# Patient Record
Sex: FEMALE | Race: BLACK OR AFRICAN AMERICAN | NOT HISPANIC OR LATINO | Employment: FULL TIME | URBAN - METROPOLITAN AREA
[De-identification: names, ages, dates, MRNs, and addresses within clinical notes are randomized per-mention and may not be internally consistent; named-entity substitution may affect disease eponyms.]

---

## 2017-02-07 ENCOUNTER — TELEPHONE (OUTPATIENT)
Dept: HEMATOLOGY/ONCOLOGY | Facility: CLINIC | Age: 37
End: 2017-02-07

## 2017-02-07 NOTE — TELEPHONE ENCOUNTER
----- Message from Nguyen Szymanski sent at 2/6/2017  3:30 PM CST -----  Contact: self  Pt needs to see a doctor for sickle cell anemia.  Pt would like to see Ulises.      Contact number [phone]

## 2017-02-08 ENCOUNTER — TELEPHONE (OUTPATIENT)
Dept: HEMATOLOGY/ONCOLOGY | Facility: CLINIC | Age: 37
End: 2017-02-08

## 2017-02-08 NOTE — TELEPHONE ENCOUNTER
----- Message from Yohana Saravia sent at 2/7/2017  4:25 PM CST -----  Contact: Pt  Porter,    Pt states the person she spoke with at [facility] regarding her records told her something different then what you two discussed, would like to speak with you for clarification     Pt contact number [phone]  Thanks

## 2017-02-09 ENCOUNTER — TELEPHONE (OUTPATIENT)
Dept: HEMATOLOGY/ONCOLOGY | Facility: CLINIC | Age: 37
End: 2017-02-09

## 2017-02-09 NOTE — TELEPHONE ENCOUNTER
----- Message from Porter Sutherland RN sent at 2/7/2017  2:54 PM CST -----  Contact: self      ----- Message -----     From: Nguyen Szymanski     Sent: 2/6/2017   3:30 PM       To: Porter Sutherland RN    Pt needs to see a doctor for sickle cell anemia.  Pt would like to see Ulises.      Contact number [phone]

## 2017-02-13 ENCOUNTER — TELEPHONE (OUTPATIENT)
Dept: HEMATOLOGY/ONCOLOGY | Facility: CLINIC | Age: 37
End: 2017-02-13

## 2017-02-20 ENCOUNTER — LAB VISIT (OUTPATIENT)
Dept: LAB | Facility: HOSPITAL | Age: 37
End: 2017-02-20
Attending: INTERNAL MEDICINE
Payer: MEDICAID

## 2017-02-20 ENCOUNTER — INITIAL CONSULT (OUTPATIENT)
Dept: HEMATOLOGY/ONCOLOGY | Facility: CLINIC | Age: 37
End: 2017-02-20
Payer: MEDICAID

## 2017-02-20 VITALS
RESPIRATION RATE: 18 BRPM | WEIGHT: 293 LBS | DIASTOLIC BLOOD PRESSURE: 90 MMHG | BODY MASS INDEX: 53.92 KG/M2 | HEIGHT: 62 IN | OXYGEN SATURATION: 97 % | HEART RATE: 106 BPM | SYSTOLIC BLOOD PRESSURE: 158 MMHG | TEMPERATURE: 99 F

## 2017-02-20 DIAGNOSIS — D50.9 IRON DEFICIENCY ANEMIA, UNSPECIFIED IRON DEFICIENCY ANEMIA TYPE: ICD-10-CM

## 2017-02-20 DIAGNOSIS — D57.40 SICKLE CELL BETA THALASSEMIA: Primary | ICD-10-CM

## 2017-02-20 DIAGNOSIS — D57.40 SICKLE CELL BETA THALASSEMIA: ICD-10-CM

## 2017-02-20 LAB
25(OH)D3+25(OH)D2 SERPL-MCNC: <8 NG/ML
ABO + RH BLD: NORMAL
ALBUMIN SERPL BCP-MCNC: 3.3 G/DL
ALP SERPL-CCNC: 105 U/L
ALT SERPL W/O P-5'-P-CCNC: 18 U/L
ANION GAP SERPL CALC-SCNC: 8 MMOL/L
AST SERPL-CCNC: 21 U/L
BASOPHILS # BLD AUTO: 0.01 K/UL
BASOPHILS NFR BLD: 0.1 %
BILIRUB SERPL-MCNC: 0.3 MG/DL
BLD GP AB SCN CELLS X3 SERPL QL: NORMAL
BUN SERPL-MCNC: 9 MG/DL
CALCIUM SERPL-MCNC: 8.8 MG/DL
CHLORIDE SERPL-SCNC: 108 MMOL/L
CO2 SERPL-SCNC: 26 MMOL/L
CREAT SERPL-MCNC: 0.9 MG/DL
DIFFERENTIAL METHOD: ABNORMAL
EOSINOPHIL # BLD AUTO: 0.1 K/UL
EOSINOPHIL NFR BLD: 1.8 %
ERYTHROCYTE [DISTWIDTH] IN BLOOD BY AUTOMATED COUNT: 17.7 %
EST. GFR  (AFRICAN AMERICAN): >60 ML/MIN/1.73 M^2
EST. GFR  (NON AFRICAN AMERICAN): >60 ML/MIN/1.73 M^2
FERRITIN SERPL-MCNC: 14 NG/ML
FERRITIN SERPL-MCNC: 14 NG/ML
FOLATE SERPL-MCNC: 6.9 NG/ML
GLUCOSE SERPL-MCNC: 99 MG/DL
HCG INTACT+B SERPL-ACNC: <1.2 MIU/ML
HCT VFR BLD AUTO: 33.3 %
HGB BLD-MCNC: 11 G/DL
IRON SERPL-MCNC: 32 UG/DL
LYMPHOCYTES # BLD AUTO: 2.6 K/UL
LYMPHOCYTES NFR BLD: 36.7 %
MCH RBC QN AUTO: 22 PG
MCHC RBC AUTO-ENTMCNC: 33 %
MCV RBC AUTO: 67 FL
MONOCYTES # BLD AUTO: 0.5 K/UL
MONOCYTES NFR BLD: 6.9 %
NEUTROPHILS # BLD AUTO: 3.9 K/UL
NEUTROPHILS NFR BLD: 54.5 %
PLATELET # BLD AUTO: 256 K/UL
PMV BLD AUTO: 9.3 FL
POTASSIUM SERPL-SCNC: 3.2 MMOL/L
PROT SERPL-MCNC: 7.5 G/DL
RBC # BLD AUTO: 5 M/UL
RETICS/RBC NFR AUTO: 1.8 %
SATURATED IRON: 6 %
SODIUM SERPL-SCNC: 142 MMOL/L
TOTAL IRON BINDING CAPACITY: 494 UG/DL
TRANSFERRIN SERPL-MCNC: 334 MG/DL
TSH SERPL DL<=0.005 MIU/L-ACNC: 1 UIU/ML
VIT B12 SERPL-MCNC: 545 PG/ML
WBC # BLD AUTO: 7.12 K/UL

## 2017-02-20 PROCEDURE — 82570 ASSAY OF URINE CREATININE: CPT

## 2017-02-20 PROCEDURE — 82728 ASSAY OF FERRITIN: CPT

## 2017-02-20 PROCEDURE — 84156 ASSAY OF PROTEIN URINE: CPT

## 2017-02-20 PROCEDURE — 84466 ASSAY OF TRANSFERRIN: CPT

## 2017-02-20 PROCEDURE — 84443 ASSAY THYROID STIM HORMONE: CPT

## 2017-02-20 PROCEDURE — 36415 COLL VENOUS BLD VENIPUNCTURE: CPT

## 2017-02-20 PROCEDURE — 83540 ASSAY OF IRON: CPT

## 2017-02-20 PROCEDURE — 82306 VITAMIN D 25 HYDROXY: CPT

## 2017-02-20 PROCEDURE — 86706 HEP B SURFACE ANTIBODY: CPT

## 2017-02-20 PROCEDURE — 85045 AUTOMATED RETICULOCYTE COUNT: CPT

## 2017-02-20 PROCEDURE — 83020 HEMOGLOBIN ELECTROPHORESIS: CPT | Mod: 91

## 2017-02-20 PROCEDURE — 82607 VITAMIN B-12: CPT

## 2017-02-20 PROCEDURE — 99999 PR PBB SHADOW E&M-EST. PATIENT-LVL III: CPT | Mod: PBBFAC,,, | Performed by: INTERNAL MEDICINE

## 2017-02-20 PROCEDURE — 86703 HIV-1/HIV-2 1 RESULT ANTBDY: CPT

## 2017-02-20 PROCEDURE — 83020 HEMOGLOBIN ELECTROPHORESIS: CPT

## 2017-02-20 PROCEDURE — 80053 COMPREHEN METABOLIC PANEL: CPT

## 2017-02-20 PROCEDURE — 99205 OFFICE O/P NEW HI 60 MIN: CPT | Mod: S$PBB,,, | Performed by: INTERNAL MEDICINE

## 2017-02-20 PROCEDURE — 82746 ASSAY OF FOLIC ACID SERUM: CPT

## 2017-02-20 PROCEDURE — 85025 COMPLETE CBC W/AUTO DIFF WBC: CPT

## 2017-02-20 PROCEDURE — 86900 BLOOD TYPING SEROLOGIC ABO: CPT

## 2017-02-20 PROCEDURE — 87340 HEPATITIS B SURFACE AG IA: CPT

## 2017-02-20 PROCEDURE — 86850 RBC ANTIBODY SCREEN: CPT

## 2017-02-20 PROCEDURE — 81001 URINALYSIS AUTO W/SCOPE: CPT

## 2017-02-20 PROCEDURE — 86803 HEPATITIS C AB TEST: CPT

## 2017-02-20 PROCEDURE — 84702 CHORIONIC GONADOTROPIN TEST: CPT

## 2017-02-20 RX ORDER — GABAPENTIN 300 MG/1
600 CAPSULE ORAL 2 TIMES DAILY
COMMUNITY
Start: 2017-02-17 | End: 2017-05-22 | Stop reason: SDUPTHER

## 2017-02-20 RX ORDER — CARBAMAZEPINE 200 MG/1
400 TABLET ORAL 2 TIMES DAILY
COMMUNITY
Start: 2017-02-17 | End: 2017-03-27 | Stop reason: SDUPTHER

## 2017-02-20 RX ORDER — FLUOXETINE HYDROCHLORIDE 40 MG/1
40 CAPSULE ORAL DAILY
COMMUNITY
Start: 2017-02-13 | End: 2018-03-12 | Stop reason: SDUPTHER

## 2017-02-20 RX ORDER — BACLOFEN 10 MG/1
10 TABLET ORAL 2 TIMES DAILY
COMMUNITY
Start: 2017-02-15 | End: 2017-05-22 | Stop reason: SDUPTHER

## 2017-02-20 RX ORDER — HYDROXYZINE PAMOATE 25 MG/1
25 CAPSULE ORAL 3 TIMES DAILY
Status: ON HOLD | COMMUNITY
Start: 2017-02-13 | End: 2017-05-05 | Stop reason: HOSPADM

## 2017-02-20 RX ORDER — HYDROXYZINE PAMOATE 50 MG/1
50 CAPSULE ORAL DAILY
Status: ON HOLD | COMMUNITY
Start: 2017-02-13 | End: 2017-05-05 | Stop reason: HOSPADM

## 2017-02-20 RX ORDER — OXYCODONE AND ACETAMINOPHEN 10; 325 MG/1; MG/1
1 TABLET ORAL EVERY 6 HOURS PRN
Qty: 120 TABLET | Refills: 0 | Status: SHIPPED | OUTPATIENT
Start: 2017-02-20 | End: 2017-03-27 | Stop reason: SDUPTHER

## 2017-02-20 NOTE — MR AVS SNAPSHOT
[facility]  [address]  [place] [address]  Phone: [phone]                  Nazanin Malone   2017 3:30 PM   Initial consult    Description:  Female : 1980   Provider:  Pee Montgomery MD   Department:  [facility]           Diagnoses this Visit        Comments    Sickle cell beta thalassemia    -  Primary            To Do List           Goals (5 Years of Data)     None       These Medications        Disp Refills Start End    oxycodone-acetaminophen (PERCOCET)  mg per tablet 120 tablet 0 2017    Take 1 tablet by mouth every 6 (six) hours as needed for Pain. - Oral    Pharmacy: [facility]/pharmacy #[id number] - [place] - [address] Ph #: [phone]         [facility]     [facility] Nurse Care [facility] -  Assistance  Registered nurses in the [facility] provide clinical advisement, health education, appointment booking, and other advisory services.  Call for this free service at [phone].             Medications           Message regarding Medications     Verify the changes and/or additions to your medication regime listed below are the same as discussed with your clinician today.  If any of these changes or additions are incorrect, please notify your healthcare provider.        START taking these NEW medications        Refills    oxycodone-acetaminophen (PERCOCET)  mg per tablet 0    Sig: Take 1 tablet by mouth every 6 (six) hours as needed for Pain.    Class: Normal    Route: Oral           Verify that the below list of medications is an accurate representation of the medications you are currently taking.  If none reported, the list may be blank. If incorrect, please contact your healthcare provider. Carry this list with you in case of emergency.           Current Medications     baclofen (LIORESAL) 10 MG tablet Take 10 mg by mouth 2 (two) times daily.     "carbamazepine (TEGRETOL) 200 mg tablet Take 400 mg by mouth 2 (two) times daily.    fluoxetine (PROZAC) 40 MG capsule Take 40 mg by mouth once daily.    gabapentin (NEURONTIN) 300 MG capsule Take 600 mg by mouth 2 (two) times daily.    hydrOXYzine pamoate (VISTARIL) 25 MG Cap Take 25 mg by mouth 3 (three) times daily.    hydrOXYzine pamoate (VISTARIL) 50 MG Cap Take 50 mg by mouth once daily.    oxycodone-acetaminophen (PERCOCET)  mg per tablet Take 1 tablet by mouth every 6 (six) hours as needed for Pain.           Clinical Reference Information           Your Vitals Were     BP Pulse Temp Resp Height Weight    158/90 (BP Location: Left arm, Patient Position: Sitting, BP Method: Automatic) 106 98.9 °F (37.2 °C) (Oral) 18 5' 2" (1.575 m) 139.1 kg (306 lb 10.6 oz)    SpO2 BMI             97% 56.09 kg/m2         Blood Pressure          Most Recent Value    BP  (!)  158/90      Allergies as of 2/20/2017     No Known Allergies      Immunizations Administered on Date of Encounter - 2/20/2017     None      Orders Placed During Today's Visit      Normal Orders This Visit    Protein / creatinine ratio, urine     Toxicology screen, urine     Urinalysis     Future Labs/Procedures Expected by Expires    Folate  2/20/2017 4/21/2018    hCG, quantitative  2/20/2017 4/21/2018    HEMOGLOBIN ELECTROPHORESIS,HGB A2 MICA.  2/20/2017 4/21/2018    TSH  2/20/2017 4/21/2018    Type & Screen  2/20/2017 4/21/2018    Vitamin B12  2/20/2017 4/21/2018    Vitamin D  2/20/2017 4/21/2018    CBC auto differential  As directed 4/21/2018    Comprehensive metabolic panel  As directed 4/21/2018    Echo 2d complete  As directed 2/20/2018    EKG 12-lead  As directed 2/20/2018    Ferritin  As directed 4/21/2018    Ferritin  As directed 4/21/2018    Hepatitis B surface antibody  As directed 4/21/2018    Hepatitis B surface antigen  As directed 4/21/2018    Hepatitis C antibody  As directed 4/21/2018    HIV-1 and HIV-2 antibodies  As directed " 4/21/2018    Iron and TIBC  As directed 4/21/2018    Reticulocytes  As directed 4/21/2018      [facility] Sign-Up     Activating your [facility] account is as easy as 1-2-3!     1) Visit [email or web address], select Sign Up Now, enter this activation code and your date of birth, then select Next.  [email or web address]  Expires: 4/6/2017  4:19 PM      2) Create a username and password to use when you visit [facility] in the future and select a security question in case you lose your password and select Next.    3) Enter your e-mail address and click Sign Up!    Additional Information  If you have questions, please e-mail [email or web address] or call [phone] to talk to our [facility] staff. Remember, [facility] is NOT to be used for urgent needs. For medical emergencies, dial 911.         Language Assistance Services     ATTENTION: Language assistance services are available, free of charge. Please call [phone].      ATENCIÓN: Si habla español, tiene a garcia disposición servicios gratuitos de asistencia lingüística. Llame al [phone].     CHÚ Ý: N?u b?n nói Ti?ng Vi?t, có các d?ch v? h? tr? ngôn ng? mi?n phí dành cho b?n. G?i s? [phone].         [facility] complies with applicable Federal civil rights laws and does not discriminate on the basis of race, color, national origin, age, disability, or sex.

## 2017-02-20 NOTE — Clinical Note
-please refer to opthalmology -please set up vaccines in ID:  HIB series, menactra, prevnar followed by pneumovax in 8 weeks  -please schedule for weekly feraheme x 2 -cbc, cmp, type and screen and MD appt in 8 weeks

## 2017-02-20 NOTE — PROGRESS NOTES
SECTION OF HEMATOLOGY AND BONE MARROW TRANSPLANT  New Patient Visit   02/21/2017  Referred by:  Self Referral  Referred for: sickle beta thal     CHIEF COMPLAINT: No chief complaint on file.      HISTORY OF PRESENT ILLNESS:   36 y female with pmh of sickle beta thal diagnosed in childhood.  She has moved around country ([place], [place], [place], now back in [place] permanently) and had hematologists taking care of her in each of these respective cities.  Her disease is notable for 1-3 VOC a year requiring ED presentation and admission.  She has episode of acute chest syndrome as young girl.  States she had subclinical stroke with no residual deficits.  Has never been on hydrea. Has history of HTN. Has 2 children.  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     Has only required rare transfusion over the course of her life.  Having mild pain crises with no concerning symptoms today in clinic.   PAST MEDICAL HISTORY:   No past medical history on file.    PAST SURGICAL HISTORY:   No past surgical history on file.    PAST SOCIAL HISTORY:       FAMILY HISTORY:  No family history on file.    CURRENT MEDICATIONS:   Current Outpatient Prescriptions   Medication Sig    baclofen (LIORESAL) 10 MG tablet Take 10 mg by mouth 2 (two) times daily.    carbamazepine (TEGRETOL) 200 mg tablet Take 400 mg by mouth 2 (two) times daily.    fluoxetine (PROZAC) 40 MG capsule Take 40 mg by mouth once daily.    gabapentin (NEURONTIN) 300 MG capsule Take 600 mg by mouth 2 (two) times daily.    hydrOXYzine pamoate (VISTARIL) 25 MG Cap Take 25 mg by mouth 3 (three) times daily.    hydrOXYzine pamoate (VISTARIL) 50 MG Cap Take 50 mg by mouth once daily.    oxycodone-acetaminophen (PERCOCET)  mg per tablet Take 1 tablet by mouth every 6 (six) hours as needed for Pain.     No current facility-administered medications for this visit.      ALLERGIES:   Review of patient's allergies indicates:  No Known  Allergies          REVIEW OF SYSTEMS:   General ROS: negative  Psychological ROS: negative  Ophthalmic ROS: negative  ENT ROS: negative  Allergy and Immunology ROS: negative  Hematological and Lymphatic ROS: negative  Endocrine ROS: negative  Respiratory ROS: negative  Cardiovascular ROS: negative  Gastrointestinal ROS: negative  Genito-Urinary ROS: negative  Musculoskeletal ROS: see HPI  Neurological ROS: negative  Dermatological ROS: negative    PHYSICAL EXAM:   Vitals:    02/20/17 1541   BP: (!) 158/90   Pulse: 106   Resp: 18   Temp: 98.9 °F (37.2 °C)       General - well developed, well nourished, no apparent distress  Head & Face - no sinus tenderness  Eyes - normal conjunctivae and lids   ENT - normal external auditory canals and tympanic membranes bilaterally oropharynx clear,  Normal dentition and gums  Neck - normal thyroid  Chest and Lung - normal respiratory effort, clear to auscultation bilaterally   Cardiovascular - RRR with no MGR, normal S1 and S2; no pedal edema  Abdomen -  soft, nontender, no palpable hepatomegaly or splenomegaly  Lymph - no palpable lymphadenopathy  Extremities - unremarkable nails and digits  Heme - no bruising, petechiae, pallor  Skin - no rashes or lesions  Psych - appropriate mood and affect      ECOG Performance Status: (foot note - ECOG PS provided by Eastern Cooperative Oncology Group) 1 - Symptomatic but completely ambulatory    Karnofsky Performance Score:  90%- Able to Carry on Normal Activity: Minor Symptoms of Disease  DATA:   Lab Results   Component Value Date    WBC 7.12 02/20/2017    HGB 11.0 (L) 02/20/2017    HCT 33.3 (L) 02/20/2017    MCV 67 (L) 02/20/2017     02/20/2017     Gran #   Date Value Ref Range Status   02/20/2017 3.9 1.8 - 7.7 K/uL Final     Gran%   Date Value Ref Range Status   02/20/2017 54.5 38.0 - 73.0 % Final     Lymph #   Date Value Ref Range Status   02/20/2017 2.6 1.0 - 4.8 K/uL Final     Lymph%   Date Value Ref Range Status   02/20/2017  36.7 18.0 - 48.0 % Final     CMP  Sodium   Date Value Ref Range Status   02/20/2017 142 136 - 145 mmol/L Final     Potassium   Date Value Ref Range Status   02/20/2017 3.2 (L) 3.5 - 5.1 mmol/L Final     Chloride   Date Value Ref Range Status   02/20/2017 108 95 - 110 mmol/L Final     CO2   Date Value Ref Range Status   02/20/2017 26 23 - 29 mmol/L Final     Glucose   Date Value Ref Range Status   02/20/2017 99 70 - 110 mg/dL Final     BUN, Bld   Date Value Ref Range Status   02/20/2017 9 6 - 20 mg/dL Final     Creatinine   Date Value Ref Range Status   02/20/2017 0.9 0.5 - 1.4 mg/dL Final     Calcium   Date Value Ref Range Status   02/20/2017 8.8 8.7 - 10.5 mg/dL Final     Total Protein   Date Value Ref Range Status   02/20/2017 7.5 6.0 - 8.4 g/dL Final     Albumin   Date Value Ref Range Status   02/20/2017 3.3 (L) 3.5 - 5.2 g/dL Final     Total Bilirubin   Date Value Ref Range Status   02/20/2017 0.3 0.1 - 1.0 mg/dL Final     Comment:     For infants and newborns, interpretation of results should be based  on gestational age, weight and in agreement with clinical  observations.  Premature Infant recommended reference ranges:  Up to 24 hours.............<8.0 mg/dL  Up to 48 hours............<12.0 mg/dL  3-5 days..................<15.0 mg/dL  6-29 days.................<15.0 mg/dL       Alkaline Phosphatase   Date Value Ref Range Status   02/20/2017 105 55 - 135 U/L Final     AST   Date Value Ref Range Status   02/20/2017 21 10 - 40 U/L Final     ALT   Date Value Ref Range Status   02/20/2017 18 10 - 44 U/L Final     Anion Gap   Date Value Ref Range Status   02/20/2017 8 8 - 16 mmol/L Final     eGFR if    Date Value Ref Range Status   02/20/2017 >60.0 >60 mL/min/1.73 m^2 Final     eGFR if non    Date Value Ref Range Status   02/20/2017 >60.0 >60 mL/min/1.73 m^2 Final     Comment:     Calculation used to obtain the estimated glomerular filtration  rate (eGFR) is the CKD-EPI equation.  Since race is unknown   in our information system, the eGFR values for   -American and Non--American patients are given   for each creatinine result.           ASSESSMENT AND PLAN:   Encounter Diagnoses   Name Primary?    Sickle cell beta thalassemia Yes    Iron deficiency anemia, unspecified iron deficiency anemia type      Sickle Cell Disease Monitoring   1)Hydrea  -1-3 crises a year; per patient with increasing severity  -will address initation at next anuj    2)Iron Overload  -she is actually iron deficient, likely contributing to fatigue/anemia  -will schedule IV feraheme x 2  -may need gyne and GI machuca  3)AVN  -has chronic bilateral hip pain   -will discuss obtaining MRI at next appt  4)LE Ulcerations  NA  5)HTN  -norvasc 10; BP elevated   -may add ace-I at next appt if still elevated  6)Opthalmic  -needs opthalmology referral  7)Pain  -continue home percocet 10 q 6 hrs; represcribed today  8)CardioPulmonary  -states  Had normal TTE by hematologist in dec 2016  -have requested report; next due dec 2018  9)Renal  -baseline UA and urine pr:Cr today  10)Neuro/CVA  -gives anecdotal history of subclinical stroke with no deficits  -will monitor  11)Vaccines   -order and document HIB series, menactra, prevnar followed by pneumovax in 8 weeks     12)Contraception  -she has had tubal ligation   Follow Up: Return in about 8 weeks (around 4/17/2017). with labs prior to appt     Gregory Montgomery MD  Hematology/Oncology/[facility]one Marrow Transplant

## 2017-02-21 ENCOUNTER — TELEPHONE (OUTPATIENT)
Dept: HEMATOLOGY/ONCOLOGY | Facility: CLINIC | Age: 37
End: 2017-02-21

## 2017-02-21 PROBLEM — D50.9 IRON DEFICIENCY ANEMIA: Status: ACTIVE | Noted: 2017-02-21

## 2017-02-21 LAB
AMPHET+METHAMPHET UR QL: NEGATIVE
BARBITURATES UR QL SCN>200 NG/ML: NEGATIVE
BENZODIAZ UR QL SCN>200 NG/ML: NEGATIVE
BILIRUB UR QL STRIP: NEGATIVE
BZE UR QL SCN: NEGATIVE
CANNABINOIDS UR QL SCN: NEGATIVE
CLARITY UR REFRACT.AUTO: CLEAR
COLOR UR AUTO: YELLOW
CREAT UR-MCNC: 162 MG/DL
CREAT UR-MCNC: 162 MG/DL
GLUCOSE UR QL STRIP: NEGATIVE
HBV SURFACE AB SER-ACNC: ABNORMAL M[IU]/ML
HBV SURFACE AG SERPL QL IA: NEGATIVE
HCV AB SERPL QL IA: NEGATIVE
HGB UR QL STRIP: ABNORMAL
HIV 1+2 AB+HIV1 P24 AG SERPL QL IA: NEGATIVE
KETONES UR QL STRIP: NEGATIVE
LEUKOCYTE ESTERASE UR QL STRIP: ABNORMAL
METHADONE UR QL SCN>300 NG/ML: NEGATIVE
MICROSCOPIC COMMENT: NORMAL
NITRITE UR QL STRIP: NEGATIVE
OPIATES UR QL SCN: NEGATIVE
PCP UR QL SCN>25 NG/ML: NEGATIVE
PH UR STRIP: 6 [PH] (ref 5–8)
PROT UR QL STRIP: NEGATIVE
PROT UR-MCNC: 17 MG/DL
PROT/CREAT RATIO, UR: 0.1
RBC #/AREA URNS AUTO: 2 /HPF (ref 0–4)
SP GR UR STRIP: 1.01 (ref 1–1.03)
SQUAMOUS #/AREA URNS AUTO: 4 /HPF
TOXICOLOGY INFORMATION: NORMAL
URN SPEC COLLECT METH UR: ABNORMAL
UROBILINOGEN UR STRIP-ACNC: NEGATIVE EU/DL
WBC #/AREA URNS AUTO: 3 /HPF (ref 0–5)

## 2017-02-21 RX ORDER — HEPARIN 100 UNIT/ML
500 SYRINGE INTRAVENOUS
Status: CANCELLED | OUTPATIENT
Start: 2017-03-08

## 2017-02-21 RX ORDER — SODIUM CHLORIDE 0.9 % (FLUSH) 0.9 %
10 SYRINGE (ML) INJECTION
Status: CANCELLED | OUTPATIENT
Start: 2017-03-08

## 2017-02-21 RX ORDER — SODIUM CHLORIDE 0.9 % (FLUSH) 0.9 %
10 SYRINGE (ML) INJECTION
Status: CANCELLED | OUTPATIENT
Start: 2017-03-01

## 2017-02-21 RX ORDER — HEPARIN 100 UNIT/ML
500 SYRINGE INTRAVENOUS
Status: CANCELLED | OUTPATIENT
Start: 2017-03-01

## 2017-02-21 NOTE — TELEPHONE ENCOUNTER
Forwarded message to Dr. Montgomery so that he could let BMT  know if this patient needs to get an emergency transfusion.

## 2017-02-21 NOTE — TELEPHONE ENCOUNTER
----- Message from Arabella Clark sent at 2/21/2017  3:38 PM CST -----  Contact: pt [phone]  Pt calling to schedule emergency infusion due to low levels of iron per Dr goodman's call.  We tried calling hem/onc, they stated they could not and that chemo could not either, that we had to send a message to doctor.    Please call the pt ASAP at [phone]    Thanks  darling

## 2017-02-22 NOTE — TELEPHONE ENCOUNTER
----- Message from Lyndsay Benson sent at 2/22/2017  8:20 AM CST -----  Contact: pt [phone]  Patient does not have approval yet. If it is urgent pre service will have to be contacted.  ----- Message -----     From: Yoana Shearer RN     Sent: 2/22/2017   8:16 AM       To: Lyndsay Benson    Please set this patient up with IV iron infusions.  Dr. Montgomery said he put the orders in.   ----- Message -----     From: Pee Montgomery MD     Sent: 2/22/2017   6:33 AM       To: Yoana Shearer RN    Not at emergency at all.   Just told her she needs IV iron.   Treatment plan is in.    ----- Message -----     From: Yoana Shearer RN     Sent: 2/21/2017   4:36 PM       To: Pee Montgomery MD    Did you call her to set up an emergency infusion today?   If you did, can you put in the orders and give the information to schedule her to [place]?   ----- Message -----     From: Arabella Clark     Sent: 2/21/2017   3:38 PM       To: Ulises BALTAZAR Staff    Pt calling to schedule emergency infusion due to low levels of iron per Dr montgomery's call.  We tried calling hem/onc, they stated they could not and that chemo could not either, that we had to send a message to doctor.    Please call the pt ASAP at [phone]    Thanks  darling

## 2017-02-22 NOTE — TELEPHONE ENCOUNTER
Sent message to  that it was not urgent, patient called and expressed the urgency for her, but Dr. Montgomery clarified that it wasn't an emergency.

## 2017-02-22 NOTE — TELEPHONE ENCOUNTER
----- Message from Arnav Clark sent at 2/21/2017  9:47 AM CST -----  Contact: Marie with the Lab   Marie with the Lab states a urinalysis was received on yesterday but orders are not working needs to collected in Epic, labs also states their are 2 other order in system and would like to clarify with nurse.  Contact ext [phone]

## 2017-02-24 ENCOUNTER — TELEPHONE (OUTPATIENT)
Dept: HEMATOLOGY/ONCOLOGY | Facility: CLINIC | Age: 37
End: 2017-02-24

## 2017-02-24 NOTE — TELEPHONE ENCOUNTER
----- Message from Kat Gomes sent at 2/24/2017 10:59 AM CST -----  Contact: Pt   Pt would like the nurse to give her a call back in regards to a missed call she had .. Contact number [phone]..

## 2017-03-01 LAB
HGB A2 MFR BLD HPLC: 5.9 %
HGB FRACT BLD ELPH PH6.0-IMP: NORMAL
HGB FRACT BLD ELPH-IMP: ABNORMAL
HGB FRACT BLD ELPH-IMP: ABNORMAL

## 2017-03-03 ENCOUNTER — PATIENT MESSAGE (OUTPATIENT)
Dept: OPTOMETRY | Facility: CLINIC | Age: 37
End: 2017-03-03

## 2017-03-03 ENCOUNTER — INFUSION (OUTPATIENT)
Dept: INFUSION THERAPY | Facility: HOSPITAL | Age: 37
End: 2017-03-03
Attending: INTERNAL MEDICINE
Payer: MEDICAID

## 2017-03-03 VITALS
HEART RATE: 88 BPM | SYSTOLIC BLOOD PRESSURE: 137 MMHG | DIASTOLIC BLOOD PRESSURE: 79 MMHG | TEMPERATURE: 98 F | RESPIRATION RATE: 18 BRPM

## 2017-03-03 DIAGNOSIS — D50.9 IRON DEFICIENCY ANEMIA, UNSPECIFIED IRON DEFICIENCY ANEMIA TYPE: Primary | ICD-10-CM

## 2017-03-03 PROCEDURE — 96365 THER/PROPH/DIAG IV INF INIT: CPT

## 2017-03-03 PROCEDURE — 63600175 PHARM REV CODE 636 W HCPCS: Performed by: INTERNAL MEDICINE

## 2017-03-03 RX ADMIN — FERUMOXYTOL 510 MG: 510 INJECTION INTRAVENOUS at 01:03

## 2017-03-03 NOTE — MR AVS SNAPSHOT
Patient Information     Patient Name Sex     Nazanin Malone Female 1980      Visit Information        Provider Department Dept Phone Center    3/3/2017 1:00 PM [facility], CHEMO [facility] Chemotherapy Infusion [phone] Dong Dominguez      Patient Instructions     None      Your Current Medications Are     baclofen (LIORESAL) 10 MG tablet    carbamazepine (TEGRETOL) 200 mg tablet    fluoxetine (PROZAC) 40 MG capsule    gabapentin (NEURONTIN) 300 MG capsule    hydrOXYzine pamoate (VISTARIL) 25 MG Cap    hydrOXYzine pamoate (VISTARIL) 50 MG Cap    oxycodone-acetaminophen (PERCOCET)  mg per tablet      Facility-Administered Medications     ferumoxytol 510 mg in dextrose 5 % 100 mL IVPB (ready to mix system)      Appointments for Next Year     3/10/2017  1:00 PM INFUSION 120 MIN (120 min.) [facility], [facility]    Arrive at check-in approximately 15 minutes before your scheduled appointment time. Bring all outside medical records and imaging, along with a list of your current medications and insurance card.    [facility], 5th Floor    2017  9:00 AM IMMUNIZATION/INJECTION (10 min.) [facility] Injection Room INJECTION, INFECTIOUS DISEASES    Arrive at check-in approximately 15 minutes before your scheduled appointment time. Bring all outside medical records and imaging, along with a list of your current medications and insurance card.    1st Floor - [place] Entrance         Default Flowsheet Data (last 24 hours)      Amb Complex Vitals Daryl        17 1300                Measurements    /79        Temp 98.3 °F (36.8 °C)        Pulse 88        Resp 18        Pain Assessment    Pain Score Zero                Allergies     No Known Allergies      Medications You Received from 2017 1418 to 2017 1418        Date/Time Order Dose Route Action     2017 1318 ferumoxytol 510 mg in dextrose 5 % 100 mL IVPB (ready to mix system) 510 mg Intravenous New Bag       Current Discharge Medication List     Cannot display discharge medications since this is not an admission.

## 2017-03-03 NOTE — PLAN OF CARE
Problem: Patient Care Overview (Adult)  Goal: Plan of Care Review  Outcome: Ongoing (interventions implemented as appropriate)  Patient tolerated treatment without complaints. Monitored for 1 hour sp infusion. PIV removed. Encouraged fluid intake. AVS provided to patient, future appointments reviewed. Discharged without s/s of adverse reaction. Instructed to call provider with any questions or concerns.

## 2017-03-10 ENCOUNTER — TELEPHONE (OUTPATIENT)
Dept: HEMATOLOGY/ONCOLOGY | Facility: CLINIC | Age: 37
End: 2017-03-10

## 2017-03-10 ENCOUNTER — INFUSION (OUTPATIENT)
Dept: INFUSION THERAPY | Facility: HOSPITAL | Age: 37
End: 2017-03-10
Attending: INTERNAL MEDICINE
Payer: MEDICAID

## 2017-03-10 VITALS
DIASTOLIC BLOOD PRESSURE: 90 MMHG | RESPIRATION RATE: 18 BRPM | TEMPERATURE: 98 F | HEART RATE: 91 BPM | SYSTOLIC BLOOD PRESSURE: 157 MMHG

## 2017-03-10 DIAGNOSIS — D50.9 IRON DEFICIENCY ANEMIA, UNSPECIFIED IRON DEFICIENCY ANEMIA TYPE: Primary | ICD-10-CM

## 2017-03-10 DIAGNOSIS — R11.0 NAUSEA: Primary | ICD-10-CM

## 2017-03-10 PROCEDURE — 96365 THER/PROPH/DIAG IV INF INIT: CPT

## 2017-03-10 PROCEDURE — 25000003 PHARM REV CODE 250: Performed by: INTERNAL MEDICINE

## 2017-03-10 PROCEDURE — 63600175 PHARM REV CODE 636 W HCPCS: Performed by: INTERNAL MEDICINE

## 2017-03-10 RX ORDER — ONDANSETRON 8 MG/1
8 TABLET, ORALLY DISINTEGRATING ORAL EVERY 6 HOURS PRN
Qty: 25 TABLET | Refills: 0 | Status: SHIPPED | OUTPATIENT
Start: 2017-03-10 | End: 2019-06-13 | Stop reason: SDUPTHER

## 2017-03-10 RX ORDER — SODIUM CHLORIDE 0.9 % (FLUSH) 0.9 %
10 SYRINGE (ML) INJECTION
Status: DISCONTINUED | OUTPATIENT
Start: 2017-03-10 | End: 2017-03-10 | Stop reason: HOSPADM

## 2017-03-10 RX ADMIN — SODIUM CHLORIDE, PRESERVATIVE FREE 10 ML: 5 INJECTION INTRAVENOUS at 03:03

## 2017-03-10 RX ADMIN — SODIUM CHLORIDE: 9 INJECTION, SOLUTION INTRAVENOUS at 02:03

## 2017-03-10 RX ADMIN — FERUMOXYTOL 510 MG: 510 INJECTION INTRAVENOUS at 02:03

## 2017-03-10 NOTE — PLAN OF CARE
Problem: Patient Care Overview (Adult)  Goal: Discharge Needs Assessment  Outcome: Ongoing (interventions implemented as appropriate)  1518-Patient tolerated treatment well. Discharged without complaints or S/S of adverse . AVS declined.  Instructed to call provider for any questions or concerns.

## 2017-03-10 NOTE — PLAN OF CARE
Problem: Patient Care Overview (Adult)  Goal: Individualization & Mutuality  Outcome: Ongoing (interventions implemented as appropriate)  1400-Labs , hx, and medications reviewed. Assessment completed. Discussed plan of care with patient. Patient in agreement. Chair reclined and warm blanket and snack offered.

## 2017-03-10 NOTE — MR AVS SNAPSHOT
Patient Information     Patient Name Sex     Nazanin Malone Female 1980      Visit Information        Provider Department Dep Phone Center    3/10/2017 1:00 PM [facility], CHEMO [facility] Chemotherapy Infusion [phone] Dong Dominguez      Patient Instructions     None      Your Current Medications Are     baclofen (LIORESAL) 10 MG tablet    carbamazepine (TEGRETOL) 200 mg tablet    fluoxetine (PROZAC) 40 MG capsule    gabapentin (NEURONTIN) 300 MG capsule    hydrOXYzine pamoate (VISTARIL) 25 MG Cap    hydrOXYzine pamoate (VISTARIL) 50 MG Cap    oxycodone-acetaminophen (PERCOCET)  mg per tablet      Facility-Administered Medications     ferumoxytol 510 mg in dextrose 5 % 100 mL IVPB (ready to mix system)    sodium chloride 0.9% 100 mL flush bag    sodium chloride 0.9% flush 10 mL      Appointments for Next Year     2017  9:00 AM IMMUNIZATION/INJECTION (10 min.) [facility] Injection [facility] INJECTION, INFECTIOUS DISEASES    Arrive at check-in approximately 15 minutes before your scheduled appointment time. Bring all outside medical records and imaging, along with a list of your current medications and insurance card.    1st Floor - [place] Entrance         Default Flowsheet Data (last 24 hours)      Amb Complex Vitals Daryl        03/10/17 1514 03/10/17 1442 03/10/17 1404 03/10/17 1344       Measurements    BP (!)  157/90   30 min post obs feraheme (!)  139/91   end v/s feraheme (!)  167/93   start feraheme (!)  144/88     Temp    98.3 °F (36.8 °C)     Pulse 91 (!)  111 96 99     Resp    18     Pain Assessment    Pain Score    Four     Pain Loc    HIP             Allergies     No Known Allergies      Medications You Received from 2017 1516 to 03/10/2017 1516        Date/Time Order Dose Route Action     03/10/2017 1405 ferumoxytol 510 mg in dextrose 5 % 100 mL IVPB (ready to mix system) 510 mg Intravenous New Bag     03/10/2017 1401 sodium chloride 0.9% 100 mL flush bag   Intravenous New Bag       Current Discharge Medication List     Cannot display discharge medications since this is not an admission.

## 2017-03-10 NOTE — TELEPHONE ENCOUNTER
Received call from infusion center RN stating that pt wanted a prescription for antiemetic d/t nausea. Called pt and informed her that zofran ODT 8 mg q6hr was prescribed per Dr. Montgomery. Also informed pt that if this does not help, to call back and will consider changing to phenergan. Pt verbalized understanding.    Donna Batres, MARCUS, NP  Hematology/Oncology

## 2017-03-24 ENCOUNTER — PATIENT MESSAGE (OUTPATIENT)
Dept: HEMATOLOGY/ONCOLOGY | Facility: CLINIC | Age: 37
End: 2017-03-24

## 2017-03-24 DIAGNOSIS — D57.40 SICKLE CELL BETA THALASSEMIA: ICD-10-CM

## 2017-03-24 RX ORDER — CARBAMAZEPINE 200 MG/1
400 TABLET ORAL 2 TIMES DAILY
Status: CANCELLED | OUTPATIENT
Start: 2017-03-24

## 2017-03-24 RX ORDER — OXYCODONE AND ACETAMINOPHEN 10; 325 MG/1; MG/1
1 TABLET ORAL EVERY 6 HOURS PRN
Qty: 120 TABLET | Refills: 0 | Status: CANCELLED | OUTPATIENT
Start: 2017-03-24 | End: 2018-03-24

## 2017-03-27 ENCOUNTER — PATIENT MESSAGE (OUTPATIENT)
Dept: HEMATOLOGY/ONCOLOGY | Facility: CLINIC | Age: 37
End: 2017-03-27

## 2017-03-27 DIAGNOSIS — D57.40 SICKLE CELL BETA THALASSEMIA: ICD-10-CM

## 2017-03-27 RX ORDER — OXYCODONE AND ACETAMINOPHEN 10; 325 MG/1; MG/1
1 TABLET ORAL EVERY 6 HOURS PRN
Qty: 120 TABLET | Refills: 0 | Status: ON HOLD | OUTPATIENT
Start: 2017-03-27 | End: 2017-05-05 | Stop reason: HOSPADM

## 2017-03-27 RX ORDER — CARBAMAZEPINE 200 MG/1
400 TABLET ORAL 2 TIMES DAILY
Qty: 60 TABLET | Refills: 0 | Status: SHIPPED | OUTPATIENT
Start: 2017-03-27 | End: 2017-05-22 | Stop reason: SDUPTHER

## 2017-03-28 DIAGNOSIS — M25.551 PAIN OF RIGHT HIP JOINT: Primary | ICD-10-CM

## 2017-03-28 DIAGNOSIS — D57.00 HB-SS DISEASE WITH CRISIS: ICD-10-CM

## 2017-03-29 ENCOUNTER — HOSPITAL ENCOUNTER (OUTPATIENT)
Dept: RADIOLOGY | Facility: HOSPITAL | Age: 37
Discharge: HOME | End: 2017-03-29
Attending: INTERNAL MEDICINE
Payer: MEDICAID

## 2017-03-29 DIAGNOSIS — D57.00 HB-SS DISEASE WITH CRISIS: ICD-10-CM

## 2017-03-29 DIAGNOSIS — M25.551 PAIN OF RIGHT HIP JOINT: ICD-10-CM

## 2017-03-29 PROCEDURE — 73502 X-RAY EXAM HIP UNI 2-3 VIEWS: CPT | Mod: 26,RT,, | Performed by: RADIOLOGY

## 2017-03-29 PROCEDURE — 73502 X-RAY EXAM HIP UNI 2-3 VIEWS: CPT | Mod: TC,RT

## 2017-04-12 ENCOUNTER — HOSPITAL ENCOUNTER (EMERGENCY)
Facility: HOSPITAL | Age: 37
Discharge: HOME | End: 2017-04-12
Attending: FAMILY MEDICINE | Admitting: FAMILY MEDICINE
Payer: MEDICAID

## 2017-04-12 ENCOUNTER — PATIENT MESSAGE (OUTPATIENT)
Dept: HEMATOLOGY/ONCOLOGY | Facility: CLINIC | Age: 37
End: 2017-04-12

## 2017-04-12 VITALS
DIASTOLIC BLOOD PRESSURE: 91 MMHG | SYSTOLIC BLOOD PRESSURE: 154 MMHG | HEIGHT: 62 IN | OXYGEN SATURATION: 100 % | TEMPERATURE: 98 F | HEART RATE: 100 BPM | BODY MASS INDEX: 53.92 KG/M2 | RESPIRATION RATE: 16 BRPM | WEIGHT: 293 LBS

## 2017-04-12 DIAGNOSIS — J45.21 MILD INTERMITTENT ASTHMA WITH ACUTE EXACERBATION: ICD-10-CM

## 2017-04-12 DIAGNOSIS — J01.90 ACUTE NON-RECURRENT SINUSITIS, UNSPECIFIED LOCATION: Primary | ICD-10-CM

## 2017-04-12 LAB
DEPRECATED S PYO AG THROAT QL EIA: NEGATIVE
FLUAV AG SPEC QL IA: NEGATIVE
FLUBV AG SPEC QL IA: NEGATIVE
SPECIMEN SOURCE: NORMAL

## 2017-04-12 PROCEDURE — 87400 INFLUENZA A/B EACH AG IA: CPT | Mod: 59

## 2017-04-12 PROCEDURE — 96372 THER/PROPH/DIAG INJ SC/IM: CPT

## 2017-04-12 PROCEDURE — 94640 AIRWAY INHALATION TREATMENT: CPT

## 2017-04-12 PROCEDURE — 87081 CULTURE SCREEN ONLY: CPT

## 2017-04-12 PROCEDURE — 25000242 PHARM REV CODE 250 ALT 637 W/ HCPCS: Performed by: PHYSICIAN ASSISTANT

## 2017-04-12 PROCEDURE — 99283 EMERGENCY DEPT VISIT LOW MDM: CPT | Mod: ,,, | Performed by: PHYSICIAN ASSISTANT

## 2017-04-12 PROCEDURE — 87880 STREP A ASSAY W/OPTIC: CPT

## 2017-04-12 PROCEDURE — 25000003 PHARM REV CODE 250: Performed by: PHYSICIAN ASSISTANT

## 2017-04-12 PROCEDURE — 63600175 PHARM REV CODE 636 W HCPCS: Performed by: PHYSICIAN ASSISTANT

## 2017-04-12 PROCEDURE — 99284 EMERGENCY DEPT VISIT MOD MDM: CPT | Mod: 25

## 2017-04-12 RX ORDER — HYDROMORPHONE HYDROCHLORIDE 1 MG/ML
1 INJECTION, SOLUTION INTRAMUSCULAR; INTRAVENOUS; SUBCUTANEOUS
Status: COMPLETED | OUTPATIENT
Start: 2017-04-12 | End: 2017-04-12

## 2017-04-12 RX ORDER — BENZONATATE 100 MG/1
100 CAPSULE ORAL 3 TIMES DAILY PRN
Qty: 20 CAPSULE | Refills: 0 | Status: SHIPPED | OUTPATIENT
Start: 2017-04-12 | End: 2017-05-12

## 2017-04-12 RX ORDER — AMOXICILLIN AND CLAVULANATE POTASSIUM 875; 125 MG/1; MG/1
1 TABLET, FILM COATED ORAL 2 TIMES DAILY
Qty: 20 TABLET | Refills: 0 | Status: ON HOLD | OUTPATIENT
Start: 2017-04-12 | End: 2017-05-05 | Stop reason: HOSPADM

## 2017-04-12 RX ORDER — PREDNISONE 20 MG/1
60 TABLET ORAL
Status: COMPLETED | OUTPATIENT
Start: 2017-04-12 | End: 2017-04-12

## 2017-04-12 RX ORDER — PREDNISONE 20 MG/1
60 TABLET ORAL DAILY
Qty: 15 TABLET | Refills: 0 | Status: ON HOLD | OUTPATIENT
Start: 2017-04-12 | End: 2017-05-05 | Stop reason: HOSPADM

## 2017-04-12 RX ORDER — HYDROMORPHONE HYDROCHLORIDE 1 MG/ML
0.5 INJECTION, SOLUTION INTRAMUSCULAR; INTRAVENOUS; SUBCUTANEOUS
Status: DISCONTINUED | OUTPATIENT
Start: 2017-04-12 | End: 2017-04-12

## 2017-04-12 RX ORDER — IPRATROPIUM BROMIDE AND ALBUTEROL SULFATE 2.5; .5 MG/3ML; MG/3ML
3 SOLUTION RESPIRATORY (INHALATION)
Status: COMPLETED | OUTPATIENT
Start: 2017-04-12 | End: 2017-04-12

## 2017-04-12 RX ORDER — OXYCODONE HYDROCHLORIDE 5 MG/1
10 TABLET ORAL
Status: COMPLETED | OUTPATIENT
Start: 2017-04-12 | End: 2017-04-12

## 2017-04-12 RX ADMIN — HYDROMORPHONE HYDROCHLORIDE 1 MG: 1 INJECTION, SOLUTION INTRAMUSCULAR; INTRAVENOUS; SUBCUTANEOUS at 03:04

## 2017-04-12 RX ADMIN — IPRATROPIUM BROMIDE AND ALBUTEROL SULFATE 3 ML: .5; 3 SOLUTION RESPIRATORY (INHALATION) at 01:04

## 2017-04-12 RX ADMIN — PREDNISONE 60 MG: 20 TABLET ORAL at 02:04

## 2017-04-12 RX ADMIN — OXYCODONE HYDROCHLORIDE 10 MG: 5 TABLET ORAL at 02:04

## 2017-04-12 NOTE — ED PROVIDER NOTES
Encounter Date: 4/12/2017       History     Chief Complaint   Patient presents with    URI     coughing up greenish     Review of patient's allergies indicates:  No Known Allergies  HPI Comments: Time seen by provider: 1:11 PM    Disclaimer: This note has been generated using voice-recognition software. There may be typographical errors that have been missed during proof-reading.    This is a 36-year-old black female with past medical history of hypertension, thalassemia, trigeminal neuralgia, and asthma who presents to the ER with a chief complaint of shortness of breath, productive cough, chest pain, sinus congestion and back pain.  Patient states her symptoms began last night.  She says her daughter was home sick with similar symptoms last week.  She says the cough is productive with green sputum.  She reports a frontal headache and clear rhinorrhea.  He reports a low-grade subjective fever.  Patient was admitted to the hospital 3 months ago for pneumonia.  She denies nausea, vomiting, or weakness in extremities.    The history is provided by the patient.     Past Medical History:   Diagnosis Date    Hypertension      History reviewed. No pertinent surgical history.  History reviewed. No pertinent family history.  Social History   Substance Use Topics    Smoking status: Never Smoker    Smokeless tobacco: None    Alcohol use No     Review of Systems   Constitutional: Positive for fever (subjective, low grade). Negative for chills.   HENT: Positive for congestion, rhinorrhea, sinus pressure and sore throat.    Respiratory: Positive for cough, shortness of breath and wheezing.    Cardiovascular: Positive for chest pain.   Gastrointestinal: Negative for nausea and vomiting.   Genitourinary: Negative for dysuria.   Musculoskeletal: Negative for back pain, neck pain and neck stiffness.   Skin: Negative for rash and wound.   Neurological: Positive for headaches. Negative for weakness and numbness.    Psychiatric/Behavioral: Negative for confusion.       Physical Exam   Initial Vitals   BP Pulse Resp Temp SpO2   04/12/17 1229 04/12/17 1229 04/12/17 1229 04/12/17 1229 04/12/17 1229   140/75 100 18 99.5 °F (37.5 °C) 97 %     Physical Exam    Nursing note and vitals reviewed.  Constitutional: She appears well-developed and well-nourished. No distress.   HENT:   Head: Normocephalic and atraumatic.   Right Ear: Tympanic membrane, external ear and ear canal normal.   Left Ear: Tympanic membrane, external ear and ear canal normal.   Nose: Mucosal edema and rhinorrhea present. Right sinus exhibits maxillary sinus tenderness and frontal sinus tenderness. Left sinus exhibits maxillary sinus tenderness and frontal sinus tenderness.   Mouth/Throat: Uvula is midline and oropharynx is clear and moist.   Neck: Normal range of motion. Neck supple.   Cardiovascular: Normal rate and regular rhythm. Exam reveals no gallop and no friction rub.    No murmur heard.  Pulmonary/Chest: Breath sounds normal. She has no wheezes. She has no rhonchi. She has no rales. She exhibits tenderness.   Musculoskeletal: Normal range of motion.   Neurological: She is alert and oriented to person, place, and time.   Skin: Skin is warm and dry. No rash noted. No erythema.   Psychiatric: She has a normal mood and affect. Her behavior is normal. Judgment and thought content normal.         ED Course   Procedures  Labs Reviewed   THROAT SCREEN, RAPID   CULTURE, STREP A,  THROAT   INFLUENZA A AND B ANTIGEN             Medical Decision Making:   History:   Old Medical Records: I decided to obtain old medical records.  Differential Diagnosis:   Pneumonia, sinusitis, URI, influenza  Clinical Tests:   Lab Tests: Ordered and Reviewed  Radiological Study: Ordered and Reviewed  ED Management:  Patient presented to the ER with complaint of shortness of breath and wheezing, cough and sinus congestion.  She reports some associated chest pain with the cough, deep  breathing or movement.  On exam, there is no wheezing heard.  She does have tenderness to palpation across her chest.  She does have maxillary and frontal sinus tenderness.  Flu swab and strep screen were negative.  Chest x-ray shows no evidence for pneumonia.  Patient had improvement of her shortness of breath and wheezing after treatment.  Patient then requested blood work to be done to see if she was in sickle cell crisis.  I said that we would check some labs and I would give her pain medication.  Patient then stated that she does not wish to have the labs done at this time.  Feel she is stable for discharge.  She was given a prescription for prednisone, Tessalon and Augmentin.  She was given strict return precautions.  She has been instructed to followup with her PCP within the next week if symptoms not improving.  She should return to the ER for any new or worsening symptoms.  This case was discussed with my supervising physician.                   ED Course     2:48 PM - patient still complaining of chest pain and leg pain.  She is concerned that she could be in a crisis.  She is requesting that we check labs and is requesting more pain medication.  I will get a CBC and reticulocyte count and give her more pain medication and reassess.    2:58 PM - I received a phone call from the [facility] nurse and the patient states that she does not wish to have lab work or IV done at this time.  I will give her a dose of pain medication prior to discharge.    Clinical Impression:   The primary encounter diagnosis was Acute non-recurrent sinusitis, unspecified location. A diagnosis of Mild intermittent asthma with acute exacerbation was also pertinent to this visit.          Sophia Lee PA-C  04/12/17 203

## 2017-04-12 NOTE — ED AVS SNAPSHOT
[facility]  [address] [address]               Trushanda Encalade   2017 12:35 PM   ED    Description:  Female : 1980   Department:  [facility]           Your Care was Coordinated By:     Provider Role From To    Reggie Yeung MD Attending Provider [phone] --    Sophia Lee PA-C Physician Assistant 17 [phone] --      Reason for Visit     URI           Diagnoses this Visit        Comments    Acute non-recurrent sinusitis, unspecified location    -  Primary     Mild intermittent asthma with acute exacerbation           ED Disposition     ED Disposition Condition Comment    Discharge             To Do List           Follow-up Information     Follow up with [facility] - Internal Medicine. Schedule an appointment as soon as possible for a visit in 1 week.    Specialty:  Internal Medicine    Contact information:    [address] [id number] [phone]    Additional information:    [facility]       These Medications        Disp Refills Start End    amoxicillin-clavulanate 875-125mg (AUGMENTIN) 875-125 mg per tablet 20 tablet 0 2017    Take 1 tablet by mouth 2 (two) times daily. - Oral    Pharmacy: [id number] [place] [facility] [address] Ph #: [phone]       predniSONE (DELTASONE) 20 MG tablet 15 tablet 0 2017    Take 3 tablets (60 mg total) by mouth once daily. - Oral    Pharmacy: [id number] [address] Ph #: [phone]       benzonatate (TESSALON) 100 MG capsule 20 capsule 0 2017    Take 1 capsule (100 mg total) by mouth 3 (three) times daily as needed for Cough. - Oral    [id number] [address] Ph #: [phone]         [facility]     [facility] Care [facility] -   Assistance  Unless otherwise directed by your provider, please contact [facility], our nurse care line that is available for 24/7 assistance.     Registered nurses in the [facility] provide: appointment scheduling, clinical advisement, health education, and other advisory services.  Call: [phone] (toll free)               Medications           Message regarding Medications     Verify the changes and/or additions to your medication regime listed below are the same as discussed with your clinician today.  If any of these changes or additions are incorrect, please notify your healthcare provider.        START taking these NEW medications        Refills    amoxicillin-clavulanate 875-125mg (AUGMENTIN) 875-125 mg per tablet 0    Sig: Take 1 tablet by mouth 2 (two) times daily.    Class: Print    Route: Oral    predniSONE (DELTASONE) 20 MG tablet 0    Sig: Take 3 tablets (60 mg total) by mouth once daily.    Class: Print    Route: Oral    benzonatate (TESSALON) 100 MG capsule 0    Sig: Take 1 capsule (100 mg total) by mouth 3 (three) times daily as needed for Cough.    Class: Print    Route: Oral      These medications were administered today        Dose Freq    predniSONE tablet 60 mg 60 mg ED 1 Time    Sig: Take 3 tablets (60 mg total) by mouth ED 1 Time.    Class: Normal    Route: Oral    albuterol-ipratropium 2.5mg-0.5mg/3mL nebulizer solution 3 mL 3 mL Every 5 min    Sig: Take 3 mLs by nebulization every 5 (five) minutes.    Class: Normal    Route: Nebulization    oxycodone immediate release tablet 10 mg 10 mg ED 1 Time    Sig: Take 2 tablets (10 mg total) by mouth ED 1 Time.    Class: Normal    Route: Oral           Verify that the below list of medications is an accurate representation of the medications you are currently taking.  If none reported, the list may be blank. If incorrect, please contact your healthcare provider. Carry this list with you in case of emergency.           Current  "Medications     baclofen (LIORESAL) 10 MG tablet Take 10 mg by mouth 2 (two) times daily.    carbamazepine (TEGRETOL) 200 mg tablet Take 2 tablets (400 mg total) by mouth 2 (two) times daily.    fluoxetine (PROZAC) 40 MG capsule Take 40 mg by mouth once daily.    gabapentin (NEURONTIN) 300 MG capsule Take 600 mg by mouth 2 (two) times daily.    oxycodone-acetaminophen (PERCOCET)  mg per tablet Take 1 tablet by mouth every 6 (six) hours as needed for Pain.    amoxicillin-clavulanate 875-125mg (AUGMENTIN) 875-125 mg per tablet Take 1 tablet by mouth 2 (two) times daily.    benzonatate (TESSALON) 100 MG capsule Take 1 capsule (100 mg total) by mouth 3 (three) times daily as needed for Cough.    hydrOXYzine pamoate (VISTARIL) 25 MG Cap Take 25 mg by mouth 3 (three) times daily.    hydrOXYzine pamoate (VISTARIL) 50 MG Cap Take 50 mg by mouth once daily.    ondansetron (ZOFRAN-ODT) 8 MG TbDL Take 1 tablet (8 mg total) by mouth every 6 (six) hours as needed.    predniSONE (DELTASONE) 20 MG tablet Take 3 tablets (60 mg total) by mouth once daily.           Clinical Reference Information           Your Vitals Were     BP Pulse Temp Resp Height Weight    140/75 100 99.5 °F (37.5 °C) (Oral) 12 5' 2" (1.575 m) 136.1 kg (300 lb)    Last Period SpO2 BMI          03/12/2017 97% 54.87 kg/m2        Allergies as of 4/12/2017     No Known Allergies      Immunizations Administered on Date of Encounter - 4/12/2017     None      ED Micro, Lab, POCT     Start Ordered       Status Ordering Provider    04/12/17 1312 04/12/17 1311  Influenza antigen  STAT      Final result     04/12/17 1312 04/12/17 1311  Throat Screen, Rapid  STAT      Final result     04/12/17 1311 04/12/17 1311  Strep A culture, throat  Once      In process       ED Imaging Orders     Start Ordered       Status Ordering Provider    04/12/17 1312 04/12/17 1311  X-Ray Chest PA And Lateral  1 time imaging      Final result         Discharge Instructions       Rest.  " Drink plenty of fluids.  Continue your regular medications as prescribed.  Return to the ED for any new or worsening symptoms.    Discharge References/Attachments     ASTHMA, ACUTE (ADULT) (ENGLISH)    SINUSITIS (ANTIBIOTIC TREATMENT) (ENGLISH)      Your Scheduled Appointments     Apr 21, 2017  9:00 AM CDT   Immunization/Injection with INJECTION, INFECTIOUS DISEASES   [facility] ([facility] )    [address]  [facility] [address]   [phone]               [facility] complies with applicable Federal civil rights laws and does not discriminate on the basis of race, color, national origin, age, disability, or sex.        Language Assistance Services     ATTENTION: Language assistance services are available, free of charge. Please call [phone].      ATENCIÓN: Si habla lottie, tiene a garcia disposición servicios gratuitos de asistencia lingüística. Llame al [phone].     CHÚ Ý: N?u b?n nói Ti?ng Vi?t, có các d?ch v? h? tr? ngôn ng? mi?n phí dành cho b?n. G?i s? [phone].

## 2017-04-12 NOTE — ED NOTES
Pt stating she does not want to have blood work done or get an IV; requesting more pain meds, abx, and D/C. L. Lee, PAC aware. New orders noted.

## 2017-04-12 NOTE — ED TRIAGE NOTES
Pt arrived to the ED with CC of SOB, HA, Pain in her hips and feet. Pt reports low grade fever today.

## 2017-04-12 NOTE — DISCHARGE INSTRUCTIONS
Rest.  Drink plenty of fluids.  Continue your regular medications as prescribed.  Return to the ED for any new or worsening symptoms.

## 2017-04-14 LAB — BACTERIA THROAT CULT: NORMAL

## 2017-04-16 ENCOUNTER — HOSPITAL ENCOUNTER (INPATIENT)
Facility: HOSPITAL | Age: 37
LOS: 19 days | Discharge: HOME | DRG: 871 | End: 2017-05-05
Attending: EMERGENCY MEDICINE | Admitting: HOSPITALIST
Payer: MEDICAID

## 2017-04-16 DIAGNOSIS — D57.00 SICKLE-CELL DISEASE WITH PAIN: Primary | ICD-10-CM

## 2017-04-16 DIAGNOSIS — R05.9 COUGH: ICD-10-CM

## 2017-04-16 PROBLEM — F11.20 OPIOID DEPENDENCE: Status: ACTIVE | Noted: 2017-04-16

## 2017-04-16 PROBLEM — I10 BENIGN ESSENTIAL HTN: Status: ACTIVE | Noted: 2017-04-16

## 2017-04-16 PROBLEM — J40 BRONCHITIS: Status: ACTIVE | Noted: 2017-04-16

## 2017-04-16 LAB
B-HCG UR QL: NEGATIVE
BASOPHILS # BLD AUTO: 0.02 K/UL
BASOPHILS NFR BLD: 0.2 %
CTP QC/QA: YES
DIFFERENTIAL METHOD: ABNORMAL
EOSINOPHIL # BLD AUTO: 0 K/UL
EOSINOPHIL NFR BLD: 0.3 %
ERYTHROCYTE [DISTWIDTH] IN BLOOD BY AUTOMATED COUNT: 19.3 %
HCT VFR BLD AUTO: 37.4 %
HGB BLD-MCNC: 12.6 G/DL
LYMPHOCYTES # BLD AUTO: 1.9 K/UL
LYMPHOCYTES NFR BLD: 20.8 %
MCH RBC QN AUTO: 22.9 PG
MCHC RBC AUTO-ENTMCNC: 33.7 %
MCV RBC AUTO: 68 FL
MONOCYTES # BLD AUTO: 0.4 K/UL
MONOCYTES NFR BLD: 4.7 %
NEUTROPHILS # BLD AUTO: 6.7 K/UL
NEUTROPHILS NFR BLD: 73.8 %
PLATELET # BLD AUTO: 215 K/UL
PMV BLD AUTO: 9.1 FL
RBC # BLD AUTO: 5.5 M/UL
RETICS/RBC NFR AUTO: 3.7 %
WBC # BLD AUTO: 9.1 K/UL

## 2017-04-16 PROCEDURE — 85025 COMPLETE CBC W/AUTO DIFF WBC: CPT

## 2017-04-16 PROCEDURE — 63600175 PHARM REV CODE 636 W HCPCS: Performed by: HOSPITALIST

## 2017-04-16 PROCEDURE — 99223 1ST HOSP IP/OBS HIGH 75: CPT | Mod: ,,, | Performed by: HOSPITALIST

## 2017-04-16 PROCEDURE — 25000242 PHARM REV CODE 250 ALT 637 W/ HCPCS: Performed by: HOSPITALIST

## 2017-04-16 PROCEDURE — 63600175 PHARM REV CODE 636 W HCPCS: Performed by: ANESTHESIOLOGY

## 2017-04-16 PROCEDURE — 94640 AIRWAY INHALATION TREATMENT: CPT

## 2017-04-16 PROCEDURE — 25000003 PHARM REV CODE 250: Performed by: HOSPITALIST

## 2017-04-16 PROCEDURE — 96375 TX/PRO/DX INJ NEW DRUG ADDON: CPT

## 2017-04-16 PROCEDURE — 94761 N-INVAS EAR/PLS OXIMETRY MLT: CPT

## 2017-04-16 PROCEDURE — 85045 AUTOMATED RETICULOCYTE COUNT: CPT

## 2017-04-16 PROCEDURE — 96376 TX/PRO/DX INJ SAME DRUG ADON: CPT

## 2017-04-16 PROCEDURE — 11000001 HC ACUTE MED/SURG PRIVATE ROOM

## 2017-04-16 PROCEDURE — 99285 EMERGENCY DEPT VISIT HI MDM: CPT | Mod: ,,, | Performed by: EMERGENCY MEDICINE

## 2017-04-16 PROCEDURE — 96365 THER/PROPH/DIAG IV INF INIT: CPT

## 2017-04-16 PROCEDURE — 63600175 PHARM REV CODE 636 W HCPCS: Performed by: EMERGENCY MEDICINE

## 2017-04-16 PROCEDURE — 27000221 HC OXYGEN, UP TO 24 HOURS

## 2017-04-16 PROCEDURE — 99285 EMERGENCY DEPT VISIT HI MDM: CPT | Mod: 25

## 2017-04-16 PROCEDURE — 81025 URINE PREGNANCY TEST: CPT | Performed by: EMERGENCY MEDICINE

## 2017-04-16 RX ORDER — IPRATROPIUM BROMIDE AND ALBUTEROL SULFATE 2.5; .5 MG/3ML; MG/3ML
3 SOLUTION RESPIRATORY (INHALATION)
Status: DISCONTINUED | OUTPATIENT
Start: 2017-04-16 | End: 2017-04-22

## 2017-04-16 RX ORDER — KETOROLAC TROMETHAMINE 30 MG/ML
30 INJECTION, SOLUTION INTRAMUSCULAR; INTRAVENOUS
Status: COMPLETED | OUTPATIENT
Start: 2017-04-16 | End: 2017-04-16

## 2017-04-16 RX ORDER — SODIUM CHLORIDE AND POTASSIUM CHLORIDE 150; 900 MG/100ML; MG/100ML
INJECTION, SOLUTION INTRAVENOUS CONTINUOUS
Status: DISCONTINUED | OUTPATIENT
Start: 2017-04-16 | End: 2017-04-16

## 2017-04-16 RX ORDER — AMLODIPINE BESYLATE 10 MG/1
10 TABLET ORAL
Status: COMPLETED | OUTPATIENT
Start: 2017-04-16 | End: 2017-04-16

## 2017-04-16 RX ORDER — ONDANSETRON 8 MG/1
8 TABLET, ORALLY DISINTEGRATING ORAL EVERY 6 HOURS PRN
Status: DISCONTINUED | OUTPATIENT
Start: 2017-04-16 | End: 2017-04-16

## 2017-04-16 RX ORDER — BENZONATATE 100 MG/1
100 CAPSULE ORAL 3 TIMES DAILY PRN
Status: DISCONTINUED | OUTPATIENT
Start: 2017-04-16 | End: 2017-05-05 | Stop reason: HOSPADM

## 2017-04-16 RX ORDER — HYDROMORPHONE HYDROCHLORIDE 1 MG/ML
0.5 INJECTION, SOLUTION INTRAMUSCULAR; INTRAVENOUS; SUBCUTANEOUS
Status: COMPLETED | OUTPATIENT
Start: 2017-04-16 | End: 2017-04-16

## 2017-04-16 RX ORDER — CETIRIZINE HYDROCHLORIDE 5 MG/1
5 TABLET ORAL DAILY
Status: DISCONTINUED | OUTPATIENT
Start: 2017-04-16 | End: 2017-05-05 | Stop reason: HOSPADM

## 2017-04-16 RX ORDER — AMOXICILLIN AND CLAVULANATE POTASSIUM 875; 125 MG/1; MG/1
1 TABLET, FILM COATED ORAL 2 TIMES DAILY
Status: DISCONTINUED | OUTPATIENT
Start: 2017-04-16 | End: 2017-04-17

## 2017-04-16 RX ORDER — CARBAMAZEPINE 200 MG/1
400 TABLET ORAL 2 TIMES DAILY
Status: DISCONTINUED | OUTPATIENT
Start: 2017-04-16 | End: 2017-05-05 | Stop reason: HOSPADM

## 2017-04-16 RX ORDER — IBUPROFEN 400 MG/1
400 TABLET ORAL EVERY 8 HOURS
Status: DISCONTINUED | OUTPATIENT
Start: 2017-04-16 | End: 2017-05-05 | Stop reason: HOSPADM

## 2017-04-16 RX ORDER — HYDROMORPHONE HYDROCHLORIDE 1 MG/ML
1 INJECTION, SOLUTION INTRAMUSCULAR; INTRAVENOUS; SUBCUTANEOUS
Status: DISCONTINUED | OUTPATIENT
Start: 2017-04-16 | End: 2017-04-16

## 2017-04-16 RX ORDER — SODIUM CHLORIDE 9 MG/ML
INJECTION, SOLUTION INTRAVENOUS CONTINUOUS
Status: DISCONTINUED | OUTPATIENT
Start: 2017-04-16 | End: 2017-04-17

## 2017-04-16 RX ORDER — OXYCODONE HYDROCHLORIDE 5 MG/1
20 TABLET ORAL EVERY 4 HOURS PRN
Status: DISCONTINUED | OUTPATIENT
Start: 2017-04-16 | End: 2017-04-19

## 2017-04-16 RX ORDER — HEPARIN SODIUM 5000 [USP'U]/ML
5000 INJECTION, SOLUTION INTRAVENOUS; SUBCUTANEOUS EVERY 8 HOURS
Status: DISCONTINUED | OUTPATIENT
Start: 2017-04-16 | End: 2017-05-05 | Stop reason: HOSPADM

## 2017-04-16 RX ORDER — AMOXICILLIN 250 MG
1 CAPSULE ORAL 2 TIMES DAILY
Status: DISCONTINUED | OUTPATIENT
Start: 2017-04-16 | End: 2017-05-05 | Stop reason: HOSPADM

## 2017-04-16 RX ORDER — DIPHENHYDRAMINE HYDROCHLORIDE 50 MG/ML
25 INJECTION INTRAMUSCULAR; INTRAVENOUS EVERY 6 HOURS PRN
Status: DISCONTINUED | OUTPATIENT
Start: 2017-04-16 | End: 2017-04-21

## 2017-04-16 RX ORDER — SODIUM CHLORIDE 0.9 % (FLUSH) 0.9 %
3 SYRINGE (ML) INJECTION EVERY 8 HOURS
Status: DISCONTINUED | OUTPATIENT
Start: 2017-04-16 | End: 2017-05-05 | Stop reason: HOSPADM

## 2017-04-16 RX ORDER — HYDROXYZINE PAMOATE 25 MG/1
25 CAPSULE ORAL 3 TIMES DAILY
Status: DISCONTINUED | OUTPATIENT
Start: 2017-04-16 | End: 2017-04-16

## 2017-04-16 RX ORDER — FLUTICASONE PROPIONATE 50 MCG
2 SPRAY, SUSPENSION (ML) NASAL DAILY
Status: DISCONTINUED | OUTPATIENT
Start: 2017-04-16 | End: 2017-05-05 | Stop reason: HOSPADM

## 2017-04-16 RX ORDER — PROMETHAZINE HYDROCHLORIDE 12.5 MG/1
12.5 TABLET ORAL EVERY 6 HOURS PRN
Status: DISCONTINUED | OUTPATIENT
Start: 2017-04-16 | End: 2017-05-05 | Stop reason: HOSPADM

## 2017-04-16 RX ORDER — FLUOXETINE HYDROCHLORIDE 20 MG/1
40 CAPSULE ORAL DAILY
Status: DISCONTINUED | OUTPATIENT
Start: 2017-04-16 | End: 2017-05-05 | Stop reason: HOSPADM

## 2017-04-16 RX ORDER — OXYCODONE AND ACETAMINOPHEN 10; 325 MG/1; MG/1
1 TABLET ORAL EVERY 6 HOURS PRN
Status: DISCONTINUED | OUTPATIENT
Start: 2017-04-16 | End: 2017-04-16

## 2017-04-16 RX ORDER — HYDROMORPHONE HYDROCHLORIDE 2 MG/ML
2 INJECTION, SOLUTION INTRAMUSCULAR; INTRAVENOUS; SUBCUTANEOUS
Status: DISCONTINUED | OUTPATIENT
Start: 2017-04-16 | End: 2017-04-17

## 2017-04-16 RX ORDER — DEXTROSE MONOHYDRATE AND SODIUM CHLORIDE 5; .45 G/100ML; G/100ML
INJECTION, SOLUTION INTRAVENOUS CONTINUOUS
Status: DISCONTINUED | OUTPATIENT
Start: 2017-04-16 | End: 2017-04-16

## 2017-04-16 RX ORDER — HYDROMORPHONE HYDROCHLORIDE 1 MG/ML
1 INJECTION, SOLUTION INTRAMUSCULAR; INTRAVENOUS; SUBCUTANEOUS
Status: COMPLETED | OUTPATIENT
Start: 2017-04-16 | End: 2017-04-16

## 2017-04-16 RX ORDER — BACLOFEN 10 MG/1
10 TABLET ORAL 2 TIMES DAILY
Status: DISCONTINUED | OUTPATIENT
Start: 2017-04-16 | End: 2017-05-05 | Stop reason: HOSPADM

## 2017-04-16 RX ADMIN — PROMETHAZINE HYDROCHLORIDE 12.5 MG: 12.5 TABLET ORAL at 05:04

## 2017-04-16 RX ADMIN — GABAPENTIN 600 MG: 300 CAPSULE ORAL at 09:04

## 2017-04-16 RX ADMIN — STANDARDIZED SENNA CONCENTRATE AND DOCUSATE SODIUM 1 TABLET: 8.6; 5 TABLET, FILM COATED ORAL at 09:04

## 2017-04-16 RX ADMIN — KETOROLAC TROMETHAMINE 30 MG: 30 INJECTION, SOLUTION INTRAMUSCULAR at 06:04

## 2017-04-16 RX ADMIN — HYDROMORPHONE HYDROCHLORIDE 2 MG: 2 INJECTION INTRAMUSCULAR; INTRAVENOUS; SUBCUTANEOUS at 05:04

## 2017-04-16 RX ADMIN — IPRATROPIUM BROMIDE AND ALBUTEROL SULFATE 3 ML: .5; 3 SOLUTION RESPIRATORY (INHALATION) at 07:04

## 2017-04-16 RX ADMIN — OXYCODONE HYDROCHLORIDE 20 MG: 5 TABLET ORAL at 12:04

## 2017-04-16 RX ADMIN — BACLOFEN 10 MG: 10 TABLET ORAL at 09:04

## 2017-04-16 RX ADMIN — DIPHENHYDRAMINE HYDROCHLORIDE 25 MG: 50 INJECTION, SOLUTION INTRAMUSCULAR; INTRAVENOUS at 11:04

## 2017-04-16 RX ADMIN — CETIRIZINE HYDROCHLORIDE 5 MG: 5 TABLET, FILM COATED ORAL at 11:04

## 2017-04-16 RX ADMIN — HYDROMORPHONE HYDROCHLORIDE 1 MG: 1 INJECTION, SOLUTION INTRAMUSCULAR; INTRAVENOUS; SUBCUTANEOUS at 07:04

## 2017-04-16 RX ADMIN — IBUPROFEN 400 MG: 400 TABLET, FILM COATED ORAL at 01:04

## 2017-04-16 RX ADMIN — CARBAMAZEPINE 400 MG: 200 TABLET ORAL at 09:04

## 2017-04-16 RX ADMIN — HYDROMORPHONE HYDROCHLORIDE 0.5 MG: 1 INJECTION, SOLUTION INTRAMUSCULAR; INTRAVENOUS; SUBCUTANEOUS at 06:04

## 2017-04-16 RX ADMIN — HEPARIN SODIUM 5000 UNITS: 5000 INJECTION, SOLUTION INTRAVENOUS; SUBCUTANEOUS at 10:04

## 2017-04-16 RX ADMIN — Medication 3 ML: at 01:04

## 2017-04-16 RX ADMIN — DIPHENHYDRAMINE HYDROCHLORIDE 25 MG: 50 INJECTION, SOLUTION INTRAMUSCULAR; INTRAVENOUS at 05:04

## 2017-04-16 RX ADMIN — PROMETHAZINE HYDROCHLORIDE 12.5 MG: 12.5 TABLET ORAL at 11:04

## 2017-04-16 RX ADMIN — AMLODIPINE BESYLATE 10 MG: 10 TABLET ORAL at 11:04

## 2017-04-16 RX ADMIN — SODIUM CHLORIDE: 0.9 INJECTION, SOLUTION INTRAVENOUS at 04:04

## 2017-04-16 RX ADMIN — AMOXICILLIN AND CLAVULANATE POTASSIUM 1 TABLET: 875; 125 TABLET, FILM COATED ORAL at 09:04

## 2017-04-16 RX ADMIN — FLUOXETINE 40 MG: 20 CAPSULE ORAL at 09:04

## 2017-04-16 RX ADMIN — AMOXICILLIN AND CLAVULANATE POTASSIUM 1 TABLET: 875; 125 TABLET, FILM COATED ORAL at 11:04

## 2017-04-16 RX ADMIN — Medication 3 ML: at 10:04

## 2017-04-16 RX ADMIN — HYDROMORPHONE HYDROCHLORIDE 2 MG: 2 INJECTION INTRAMUSCULAR; INTRAVENOUS; SUBCUTANEOUS at 02:04

## 2017-04-16 RX ADMIN — SODIUM CHLORIDE AND POTASSIUM CHLORIDE: .9; .15 SOLUTION INTRAVENOUS at 09:04

## 2017-04-16 RX ADMIN — IBUPROFEN 400 MG: 400 TABLET, FILM COATED ORAL at 10:04

## 2017-04-16 RX ADMIN — OXYCODONE HYDROCHLORIDE 20 MG: 5 TABLET ORAL at 04:04

## 2017-04-16 RX ADMIN — HYDROMORPHONE HYDROCHLORIDE 2 MG: 2 INJECTION INTRAMUSCULAR; INTRAVENOUS; SUBCUTANEOUS at 11:04

## 2017-04-16 RX ADMIN — HEPARIN SODIUM 5000 UNITS: 5000 INJECTION, SOLUTION INTRAVENOUS; SUBCUTANEOUS at 01:04

## 2017-04-16 RX ADMIN — FLUTICASONE PROPIONATE 2 SPRAY: 50 SPRAY, METERED NASAL at 02:04

## 2017-04-16 RX ADMIN — OXYCODONE HYDROCHLORIDE 20 MG: 5 TABLET ORAL at 10:04

## 2017-04-16 NOTE — H&P
"History and Physical   Hospital Medicine       Team: [facility] HOSP MED A Yamilka Kemp MD  Principal Problem:Sickle-cell disease with pain  Patient information was obtained from patient, past medical records and ER records. Primary care Physician: Primary Doctor No    Chief Complaint:  Right leg pain    HPI:  Nazanin Malone is a 37 y.o. female who  has a PMH of Hypertension; Sickle cell-beta thalassemia disease with pain; and Trigeminal neuralgia. The patient presented to [facility] on 2017 due to worsening right leg pain over the last week as well as a cough productive of green mucous.  She states she came to ED 2 days ago for her cough and was prescribed Augmentin.  She says the mucous is no longer green, but she still feels all "stuffed up and congested."  She has had to be admitted a few other times due to sickle pain crisis, but rarely needs transfusions.     Past Medical History:  Past Medical History:   Diagnosis Date    Hypertension     Sickle cell-beta thalassemia disease with pain     Trigeminal neuralgia        Past Surgical History:  Past Surgical History:   Procedure Laterality Date     SECTION      TUBAL LIGATION         Allergies:  Review of patient's allergies indicates:   Allergen Reactions    Morphine Itching       Home Medications:  Prior to Admission medications    Medication Sig Start Date End Date Taking? Authorizing Provider   amoxicillin-clavulanate 875-125mg (AUGMENTIN) 875-125 mg per tablet Take 1 tablet by mouth 2 (two) times daily. 17 Yes Sophia Lee PA-C   baclofen (LIORESAL) 10 MG tablet Take 10 mg by mouth 2 (two) times daily. 2/15/17  Yes Historical Provider, MD   benzonatate (TESSALON) 100 MG capsule Take 1 capsule (100 mg total) by mouth 3 (three) times daily as needed for Cough. 17 Yes Sophia Lee PA-C   carbamazepine (TEGRETOL) 200 mg tablet Take 2 tablets (400 mg total) by mouth 2 (two) times daily. 3/27/17  Yes " "Pee [place] MD   fluoxetine (PROZAC) 40 MG capsule Take 40 mg by mouth once daily. 17  Yes Historical Provider, MD   gabapentin (NEURONTIN) 300 MG capsule Take 600 mg by mouth 2 (two) times daily. 17  Yes Historical Provider, MD   hydrOXYzine pamoate (VISTARIL) 25 MG Cap Take 25 mg by mouth 3 (three) times daily. 17  Yes Historical Provider, MD   hydrOXYzine pamoate (VISTARIL) 50 MG Cap Take 50 mg by mouth once daily. 17  Yes Historical Provider, MD   ondansetron (ZOFRAN-ODT) 8 MG TbDL Take 1 tablet (8 mg total) by mouth every 6 (six) hours as needed. 3/10/17  Yes Donna Batres NP   oxycodone-acetaminophen (PERCOCET)  mg per tablet Take 1 tablet by mouth every 6 (six) hours as needed for Pain. 3/27/17 3/27/18 Yes Pee [place] MD   predniSONE (DELTASONE) 20 MG tablet Take 3 tablets (60 mg total) by mouth once daily. 17 Yes Sophia Lee PA-C       Family History:  History reviewed. No pertinent family history.    Social History:  Social History   Substance Use Topics    Smoking status: Never Smoker    Smokeless tobacco: None    Alcohol use None     Review of Systems:  Constitutional: positive for fatigue  Eyes: negative  Respiratory: positive for cough and sputum  Cardiovascular: negative  Gastrointestinal: negative  Genitourinary:negative  Hematologic/lymphatic: negative  Musculoskeletal:positive for arthralgias and myalgias  Neurological: negative  Behavioral/Psych: negative  Endocrine: negative  All other systems are reviewed and are negative.     Objective:     Last 24 Hour Vital Signs:  BP  Min: 165/95  Max: 184/96  Temp  Av.4 °F (36.9 °C)  Min: 98.2 °F (36.8 °C)  Max: 98.6 °F (37 °C)  Pulse  Av  Min: 82  Max: 91  Resp  Av.5  Min: 18  Max: 20  SpO2  Av %  Min: 95 %  Max: 98 %  Height  Av' 2" (157.5 cm)  Min: 5' 2" (157.5 cm)  Max: 5' 2" (157.5 cm)  Weight  Av.1 kg (300 lb)  Min: 136.1 kg (300 lb)  Max: 136.1 kg (300 " lb)  Body mass index is 54.87 kg/(m^2).       Physical Examination:  General appearance: alert, appears stated age and cooperative  Head: Normocephalic, without obvious abnormality, atraumatic  Eyes: conjunctivae/corneas clear. PERRL, EOM's intact. Fundi benign.  Neck: no adenopathy, no carotid bruit, no JVD, supple, symmetrical, trachea midline and thyroid not enlarged, symmetric, no tenderness/mass/nodules  Lungs: clear to auscultation bilaterally  Heart: regular rate and rhythm, S1, S2 normal, no murmur, click, rub or gallop  Abdomen: soft, non-tender; bowel sounds normal; no masses,  no organomegaly  Extremities: edema 1+ in RLE  Pulses: 2+ and symmetric  Skin: Skin color, texture, turgor normal. No rashes or lesions  Neurologic: Grossly normal      Laboratory:  Most Recent Data:  CBC:   Lab Results   Component Value Date    WBC 9.10 04/16/2017    HGB 12.6 04/16/2017    HCT 37.4 04/16/2017     04/16/2017    MCV 68 (L) 04/16/2017    RDW 19.3 (H) 04/16/2017     BMP:   Lab Results   Component Value Date     02/20/2017    K 3.2 (L) 02/20/2017     02/20/2017    CO2 26 02/20/2017    BUN 9 02/20/2017    CREATININE 0.9 02/20/2017    GLU 99 02/20/2017    CALCIUM 8.8 02/20/2017     LFTs:   Lab Results   Component Value Date    PROT 7.5 02/20/2017    ALBUMIN 3.3 (L) 02/20/2017    BILITOT 0.3 02/20/2017    AST 21 02/20/2017    ALKPHOS 105 02/20/2017    ALT 18 02/20/2017     Coags: No results found for: INR, PROTIME, PTT     Assessment:     Nazanin Malone is a 37 y.o. female with:  Active Hospital Problems    Diagnosis    *Sickle-cell disease with pain        Plan:     # Sickle Cell pain crisis  # opioid dependence  - pt having complaints typical of her pain crisis.   - will start on IV fluids  - Dilaudid 2 mg IV Q3 PRN severe pain and Oxycodone 20 mg Q 4 PRN moderate pain  - Pt takes Oxycodone- acetaminophen 10/325 - 2 tabs Q 4 PRN pain at home.   - can likely wean dilaudid to 1mg tomorrow.     #  Bronchitis  - continue Augmentin  - will start flonase and zytrec for allergies.     # Benign Essential HTN  - continue home Norvasc  - would add ace if BP stays elevated after adequate pain control    # RLE swelling and pain  - will get BLE ultrasound to r/o DVT    Yamilka Kemp MD  [facility]

## 2017-04-16 NOTE — IP AVS SNAPSHOT
[facility]  [address]  [facility] [address]  Phone: [phone]           Patient Discharge Instructions   Our goal is to set you up for success. This packet includes information on your condition, medications, and your home care.  It will help you care for yourself to prevent having to return to the hospital.     Please ask your nurse if you have any questions.      There are many details to remember when preparing to leave the hospital. Here is what you will need to do:    1. Take your medicine. If you are prescribed medications, review your Medication List on the following pages. You may have new medications to  at the pharmacy and others that you'll need to stop taking. Review the instructions for how and when to take your medications. Talk with your doctor or nurses if you are unsure of what to do.     2. Go to your follow-up appointments. Specific follow-up information is listed in the following pages. Your may be contacted by a nurse or clinical provider about future appointments. Be sure we have all of the phone numbers to reach you. Please contact your provider's office if you are unable to make an appointment.     3. Watch for warning signs. Your doctor or nurse will give you detailed warning signs to watch for and when to call for assistance. These instructions may also include educational information about your condition. If you experience any of warning signs to your health, call your doctor.           [facility]  Unless otherwise directed by your provider, please   contact [facility], our nurse care line   that is available for 24/7 assistance.     [phone] (toll-free)     Registered nurses in the [facility]   provide: appointment scheduling, clinical advisement, health education, and other advisory services.                  ** Verify the list of medication(s) below is accurate and up to date. Carry this with you in case of  emergency. If your medications have changed, please notify your healthcare provider.             Medication List      START taking these medications        Additional Info                      amlodipine 10 MG tablet   Commonly known as:  NORVASC   Quantity:  30 tablet   Refills:  3   Dose:  10 mg    Last time this was given:  10 mg on 5/5/2017 10:46 AM   Instructions:  Take 1 tablet (10 mg total) by mouth once daily.     Begin Date    AM    Noon    PM    Bedtime       budesonide-formoterol 160-4.5 mcg 160-4.5 mcg/actuation Hfaa   Commonly known as:  SYMBICORT   Quantity:  10.2 g   Refills:  2   Dose:  2 puff    Instructions:  Inhale 2 puffs into the lungs every 12 (twelve) hours. Controller     Begin Date    AM    Noon    PM    Bedtime       carvedilol 25 MG tablet   Commonly known as:  COREG   Quantity:  60 tablet   Refills:  3   Dose:  25 mg    Last time this was given:  25 mg on 5/5/2017 10:44 AM   Instructions:  Take 1 tablet (25 mg total) by mouth 2 (two) times daily.     Begin Date    AM    Noon    PM    Bedtime       hydrALAZINE 100 MG tablet   Commonly known as:  APRESOLINE   Quantity:  90 tablet   Refills:  3   Dose:  100 mg    Last time this was given:  100 mg on 5/5/2017  6:07 AM   Instructions:  Take 1 tablet (100 mg total) by mouth every 8 (eight) hours.     Begin Date    AM    Noon    PM    Bedtime       HYDROmorphone 4 MG tablet   Commonly known as:  DILAUDID   Quantity:  60 tablet   Refills:  0   Dose:  4 mg    Last time this was given:  4 mg on 5/4/2017  4:40 AM   Instructions:  Take 1 tablet (4 mg total) by mouth every 4 (four) hours as needed.     Begin Date    AM    Noon    PM    Bedtime       lisinopril 40 MG tablet   Commonly known as:  PRINIVIL,ZESTRIL   Quantity:  30 tablet   Refills:  3   Dose:  40 mg    Last time this was given:  40 mg on 5/5/2017 10:44 AM   Instructions:  Take 1 tablet (40 mg total) by mouth once daily.     Begin Date    AM    Noon    PM    Bedtime       ramelteon 8 mg  tablet   Commonly known as:  ROZEREM   Quantity:  30 tablet   Refills:  0   Dose:  8 mg    Last time this was given:  8 mg on 4/30/2017 12:27 AM   Instructions:  Take 1 tablet (8 mg total) by mouth nightly as needed for Insomnia.     Begin Date    AM    Noon    PM    Bedtime       senna-docusate 8.6-50 mg 8.6-50 mg per tablet   Commonly known as:  PERICOLACE   Refills:  0   Dose:  1 tablet    Last time this was given:  1 tablet on 5/5/2017 10:45 AM   Instructions:  Take 1 tablet by mouth 2 (two) times daily.     Begin Date    AM    Noon    PM    Bedtime         CONTINUE taking these medications        Additional Info                      baclofen 10 MG tablet   Commonly known as:  LIORESAL   Refills:  0   Dose:  10 mg    Last time this was given:  10 mg on 5/5/2017 10:45 AM   Instructions:  Take 10 mg by mouth 2 (two) times daily.     Begin Date    AM    Noon    PM    Bedtime       benzonatate 100 MG capsule   Commonly known as:  TESSALON   Quantity:  20 capsule   Refills:  0   Dose:  100 mg    Last time this was given:  100 mg on 5/1/2017  5:20 AM   Instructions:  Take 1 capsule (100 mg total) by mouth 3 (three) times daily as needed for Cough.     Begin Date    AM    Noon    PM    Bedtime       carbamazepine 200 mg tablet   Commonly known as:  TEGRETOL   Quantity:  60 tablet   Refills:  0   Dose:  400 mg    Last time this was given:  400 mg on 5/5/2017 10:45 AM   Instructions:  Take 2 tablets (400 mg total) by mouth 2 (two) times daily.     Begin Date    AM    Noon    PM    Bedtime       fluoxetine 40 MG capsule   Commonly known as:  PROZAC   Refills:  0   Dose:  40 mg    Last time this was given:  40 mg on 5/5/2017 10:44 AM   Instructions:  Take 40 mg by mouth once daily.     Begin Date    AM    Noon    PM    Bedtime       gabapentin 300 MG capsule   Commonly known as:  NEURONTIN   Refills:  0   Dose:  600 mg    Last time this was given:  600 mg on 5/5/2017 10:43 AM   Instructions:  Take 600 mg by mouth 2 (two)  times daily.     Begin Date    AM    Noon    PM    Bedtime       ondansetron 8 MG Tbdl   Commonly known as:  ZOFRAN-ODT   Quantity:  25 tablet   Refills:  0   Dose:  8 mg    Instructions:  Take 1 tablet (8 mg total) by mouth every 6 (six) hours as needed.     Begin Date    AM    Noon    PM    Bedtime         STOP taking these medications     amoxicillin-clavulanate 875-125mg 875-125 mg per tablet   Commonly known as:  AUGMENTIN       hydrOXYzine pamoate 25 MG Cap   Commonly known as:  VISTARIL       hydrOXYzine pamoate 50 MG Cap   Commonly known as:  VISTARIL       oxycodone-acetaminophen  mg per tablet   Commonly known as:  PERCOCET       predniSONE 20 MG tablet   Commonly known as:  DELTASONE            Where to Get Your Medications      These medications were sent to [facility]/pharmacy #[id number] - [place] - [address]  [address] [facility] [address]     Phone:  [phone]     amlodipine 10 MG tablet    budesonide-formoterol 160-4.5 mcg 160-4.5 mcg/actuation Hfaa    carvedilol 25 MG tablet    hydrALAZINE 100 MG tablet    lisinopril 40 MG tablet    ramelteon 8 mg tablet         You can get these medications from any pharmacy     Bring a paper prescription for each of these medications     HYDROmorphone 4 MG tablet       You don't need a prescription for these medications     senna-docusate 8.6-50 mg 8.6-50 mg per tablet                  Please bring to all follow up appointments:    1. A copy of your discharge instructions.  2. All medicines you are currently taking in their original bottles.  3. Identification and insurance card.    Please arrive 15 minutes ahead of scheduled appointment time.    Please call 24 hours in advance if you must reschedule your appointment and/or time.        Your Scheduled Appointments     May 12, 2017  2:00 PM T   Hospital Follow Up with PHYSICIAN, [facility]   [facility] - [facility] ([facility])    [address]  [facility] [id number] [phone]              Follow-up Information     Follow up with Vito Elizalde - [facility] On 5/12/2017.    Specialty:  Priority Care    Why:  2:00pm  Primary Care Hospital discharge follow up    Contact information:    [address]  [facility] [address] [phone]    Additional information:    [facility]        Discharge References/Attachments     SMOKING CESSATION (ENGLISH)        Primary Diagnosis     Your primary diagnosis was:  Sickle-Cell Disease With Pain      Admission Information     Date & Time Provider Department CSN    4/16/2017  5:04 AM Miguel Tanner MD [facility] [id number]      Care Providers     Provider Role Specialty Primary office phone    Miguel Tanner MD Attending Provider Hospitalist [phone]      Your Vitals Were     BP Pulse Temp Resp Height Weight    135/77 (BP Location: Left arm, Patient Position: Lying, BP Method: Automatic) 80 98.1 °F (36.7 °C) 16 5' 2" (1.575 m) 136.1 kg (300 lb)    Last Period SpO2 BMI          03/12/2017 97% 54.87 kg/m2        Recent Lab Values     No lab values to display.      Allergies as of 5/5/2017        Reactions    Morphine Itching      Advance Directives     An advance directive is a document which, in the event you are no longer able to make decisions for yourself, tells your healthcare team what kind of treatment you do or do not want to receive, or who you would like to make those decisions for you.  If you do not currently have an advance directive, [facility] encourages you to create one.  For more information call:  [phone]), [phone] ([phone]),  or log on to [email or web address].        Language Assistance Services     ATTENTION: Language assistance services are available, free of charge. Please call [phone].      ATENCIÓN: Si habla español, tiene a garcia disposición servicios " valerie de asistencia lingüística. Mercedes foss [phone].     [facility] Ý: N?u b?n nói Ti?ng Vi?t, có các d?ch v? h? tr? ngôn ng? mi?n phí dành cho b?n. G?i s? [phone].        Pneumonmia Discharge Instructions                [facility] Sign-Up     Activating your [facility] account is as easy as 1-2-3!     1) Visit [email or web address], select Sign Up Now, enter this activation code and your date of birth, then select Next.  Activation code not generated  Current Patient Portal Status: Account disabled      2) Create a username and password to use when you visit [facility] in the future and select a security question in case you lose your password and select Next.    3) Enter your e-mail address and click Sign Up!    Additional Information  If you have questions, please e-mail [email or web address] or call [phone] to talk to our [facility] [email or web address] staff. Remember, [facility] is NOT to be used for urgent needs. For medical emergencies, dial 911.          [facility] complies with applicable Federal civil rights laws and does not discriminate on the basis of race, color, national origin, age, disability, or sex.

## 2017-04-16 NOTE — ED PROVIDER NOTES
Encounter Date: 2017       History     Chief Complaint   Patient presents with    Sickle Cell Pain Crisis     pt reports I am having a sickle cell pain crisis and my home meds are not working      Review of patient's allergies indicates:   Allergen Reactions    Morphine Itching     HPI Comments: 38 yo F with PMH SCD here with pain in her R leg. Pain is constant and sharp. Consistent with previous sickle crises. Pain is affecting her ambulation. She has been suffering from a sinus infection for which she has been on antibiotics since a few days ago. She denies any productive cough, fevers, shortness of breath, etc. She is on percocet at home and took these tonight but they did not provide relief of her pain.     The history is provided by the patient.     Past Medical History:   Diagnosis Date    Hypertension     Sickle cell-beta thalassemia disease with pain     Trigeminal neuralgia      Past Surgical History:   Procedure Laterality Date     SECTION      TUBAL LIGATION       History reviewed. No pertinent family history.  Social History   Substance Use Topics    Smoking status: Never Smoker    Smokeless tobacco: None    Alcohol use None     Review of Systems   Constitutional: Negative for fever.   HENT: Positive for congestion. Negative for sore throat.    Respiratory: Negative for shortness of breath.    Cardiovascular: Negative for chest pain and leg swelling.   Gastrointestinal: Negative for nausea.   Genitourinary: Negative for dysuria.   Musculoskeletal: Negative for back pain.        Leg pain     Skin: Negative for rash.   Neurological: Negative for weakness.   Hematological: Does not bruise/bleed easily.       Physical Exam   Initial Vitals   BP Pulse Resp Temp SpO2   17 0415 17 0415 17 0415 17 0415 17 0415   176/98 91 18 98.2 °F (36.8 °C) 98 %     Physical Exam    Constitutional: She appears well-developed and well-nourished. She is not diaphoretic. No  distress.   HENT:   Head: Normocephalic and atraumatic.   Eyes: EOM are normal. Pupils are equal, round, and reactive to light.   Neck: Normal range of motion. Neck supple. No thyromegaly present. No tracheal deviation present.   Cardiovascular: Normal rate, regular rhythm, normal heart sounds and intact distal pulses. Exam reveals no gallop and no friction rub.    No murmur heard.  Pulmonary/Chest: Breath sounds normal. No stridor. No respiratory distress. She has no wheezes. She has no rales.   Abdominal: Soft. Bowel sounds are normal. She exhibits no distension. There is no tenderness. There is no rebound.   Musculoskeletal: Normal range of motion. She exhibits no edema.   Neurological: She is alert and oriented to person, place, and time. No cranial nerve deficit.   Skin: Skin is warm and dry. No erythema.   Psychiatric: She has a normal mood and affect. Her behavior is normal. Thought content normal.         ED Course   Procedures  Labs Reviewed   CBC W/ AUTO DIFFERENTIAL - Abnormal; Notable for the following:        Result Value    RBC 5.50 (*)     MCV 68 (*)     MCH 22.9 (*)     RDW 19.3 (*)     MPV 9.1 (*)     Gran% 73.8 (*)     All other components within normal limits   RETICULOCYTES - Abnormal; Notable for the following:     Retic 3.7 (*)     All other components within normal limits   URINALYSIS   POCT URINE PREGNANCY             Medical Decision Making:   Initial Assessment:   36 yo F with PMH SCD here with crisis  Differential Diagnosis:   Sickle crisis, MSK pain  After administering increasing amounts of narcotics, patient's pain is still uncontrolled. Pt to be admitted to hospital medicine for intractable pain.  Cortney Day, PGY-3  7:56 AM         APC / Resident Notes:   6:36 AM: Pt received IV toradol with no relief. Treated with 0.5 Dilaudid. Will re-evaluate.   Liang Cole MD  PGY-3 Anesthesiology  P: [phone]           Attending Attestation:   Physician Attestation Statement for  Resident:  As the supervising MD   Physician Attestation Statement: I have personally seen and examined this patient.   I agree with the above history. -: States R leg pain typical of sickle cell pain.  No edema.  No trauma.  No rash  Has ongoing cough - seen here recently for same, cough is occ productive of dark sputum  No fevers     As the supervising MD I agree with the above PE.   -: Nad, wdwn  ctab  Rrr, nl s1/2  abd soft, ntnd  R leg tender diffusely, no edema, no rash, no crepitus  2+ dp bilat  a0x3   As the supervising MD I agree with the above treatment, course, plan, and disposition.   -: Imp:  Pain pt states is c/w typical sickle flare.  Cbc/retic, cxr given ongoing cough r/o pna, analgesia, re-eval.  Lower suspicion for dvt, abscess, arterial occlusion, fracture, other.    I have reviewed and agree with the residents interpretation of the following: lab data and x-rays.  I have reviewed the following: old records at this facility.            Attending ED Notes:   Endorsed at shift change pending re-evaluation after meds.  Home if better, admit if not adequately controlled.            ED Course     Clinical Impression:   The primary encounter diagnosis was Sickle-cell disease with pain. A diagnosis of Cough was also pertinent to this visit.          Cortney Day MD  Resident  04/16/17 7813       Luis Manuel Francis MD  04/16/17 2024

## 2017-04-16 NOTE — ED TRIAGE NOTES
.  Nazanin Malone, a 37 y.o. female presents to the ED with c/o sickle cell crisis. Pt reports right ankle swelling and right leg pain x 2 days. Pt reports that she was recently seen here for sinus infection, and reports non productive cough. Pt denies NVDF.       Chief Complaint   Patient presents with    Sickle Cell Pain Crisis     pt reports I am having a sickle cell pain crisis and my home meds are not working      Review of patient's allergies indicates:   Allergen Reactions    Morphine Itching     Past Medical History:   Diagnosis Date    Hypertension     Sickle cell-beta thalassemia disease with pain     Trigeminal neuralgia      LOC: Patient name and date of birth verified.  The patient is awake, alert and aware of environment with an appropriate affect, the patient is oriented x 3 and speaking appropriately.  Pt in NAD.    APPEARANCE: Patient resting comfortably and in no acute distress, patient is clean and well groomed, patient's clothing is properly fastened.  SKIN: The skin is warm and dry, color consistent with ethnicity, patient has normal skin turgor and moist mucus membranes, skin intact, no breakdown or brusing noted.  MUSCULOSKELETAL: Patient moving all extremities well, no obvious swelling or deformities noted.Pt reports right leg pain and swelling to right ankle.   RESPIRATORY: Airway is open and patent, respirations are spontaneous, patient has a normal effort and rate, no accessory muscle use noted.  CARDIAC: Patient has a normal rate and rhythm, no peripheral edema noted, capillary refill < 3 seconds.  ABDOMEN: Soft and non tender to palpation, no distention noted. Bowel sounds present in all four quadrants.  NEUROLOGIC: Eyes open spontaneously, behavior appropriate to situation, follows commands, facial expression symmetrical, bilateral hand grasp equal and even, purposeful motor response noted, normal sensation in all extremities when touched with a finger.

## 2017-04-16 NOTE — PLAN OF CARE
Problem: Patient Care Overview  Goal: Plan of Care Review  Outcome: Ongoing (interventions implemented as appropriate)  Pt remained free from falls/inuires. VSS. Afebrile.  Pt c/o of 10/10 pain. Administered PRN pain medication as ordered. No s/s of distress. Pt is in bed with side rails up x 3, wheels locked, bed in lowest position, call light inr each. Family at the bedside.

## 2017-04-16 NOTE — ED NOTES
AM assessment completed.  Patient alert and tearful due to pain from her right groin to her toes.  Reports that dilaudid has given only minimal relief.  MD notified.

## 2017-04-17 LAB
ALBUMIN SERPL BCP-MCNC: 4 G/DL
ALP SERPL-CCNC: 99 U/L
ALT SERPL W/O P-5'-P-CCNC: 40 U/L
ANION GAP SERPL CALC-SCNC: 9 MMOL/L
ANISOCYTOSIS BLD QL SMEAR: SLIGHT
AST SERPL-CCNC: 95 U/L
BASOPHILS # BLD AUTO: ABNORMAL K/UL
BASOPHILS NFR BLD: 0 %
BILIRUB SERPL-MCNC: 0.8 MG/DL
BILIRUB UR QL STRIP: NEGATIVE
BUN SERPL-MCNC: 15 MG/DL
CALCIUM SERPL-MCNC: 8.7 MG/DL
CHLORIDE SERPL-SCNC: 104 MMOL/L
CLARITY UR REFRACT.AUTO: ABNORMAL
CO2 SERPL-SCNC: 24 MMOL/L
COLOR UR AUTO: YELLOW
CREAT SERPL-MCNC: 1.1 MG/DL
DIFFERENTIAL METHOD: ABNORMAL
EOSINOPHIL # BLD AUTO: ABNORMAL K/UL
EOSINOPHIL NFR BLD: 0 %
ERYTHROCYTE [DISTWIDTH] IN BLOOD BY AUTOMATED COUNT: 19.7 %
EST. GFR  (AFRICAN AMERICAN): >60 ML/MIN/1.73 M^2
EST. GFR  (NON AFRICAN AMERICAN): >60 ML/MIN/1.73 M^2
GLUCOSE SERPL-MCNC: 125 MG/DL
GLUCOSE UR QL STRIP: NEGATIVE
HCT VFR BLD AUTO: 33.1 %
HGB BLD-MCNC: 11.2 G/DL
HGB UR QL STRIP: NEGATIVE
KETONES UR QL STRIP: NEGATIVE
LEUKOCYTE ESTERASE UR QL STRIP: NEGATIVE
LYMPHOCYTES # BLD AUTO: ABNORMAL K/UL
LYMPHOCYTES NFR BLD: 11 %
MAGNESIUM SERPL-MCNC: 2.2 MG/DL
MCH RBC QN AUTO: 23.1 PG
MCHC RBC AUTO-ENTMCNC: 33.8 %
MCV RBC AUTO: 68 FL
MONOCYTES # BLD AUTO: ABNORMAL K/UL
MONOCYTES NFR BLD: 4 %
NEUTROPHILS NFR BLD: 84 %
NEUTS BAND NFR BLD MANUAL: 1 %
NITRITE UR QL STRIP: NEGATIVE
NRBC BLD-RTO: ABNORMAL /100 WBC
OVALOCYTES BLD QL SMEAR: ABNORMAL
PH UR STRIP: 5 [PH] (ref 5–8)
PHOSPHATE SERPL-MCNC: 5.3 MG/DL
PLATELET # BLD AUTO: 107 K/UL
PMV BLD AUTO: 9.2 FL
POIKILOCYTOSIS BLD QL SMEAR: SLIGHT
POLYCHROMASIA BLD QL SMEAR: ABNORMAL
POTASSIUM SERPL-SCNC: 5.5 MMOL/L
PROT SERPL-MCNC: 7.8 G/DL
PROT UR QL STRIP: NEGATIVE
RBC # BLD AUTO: 4.85 M/UL
SODIUM SERPL-SCNC: 137 MMOL/L
SP GR UR STRIP: 1.01 (ref 1–1.03)
TARGETS BLD QL SMEAR: ABNORMAL
URN SPEC COLLECT METH UR: ABNORMAL
UROBILINOGEN UR STRIP-ACNC: NEGATIVE EU/DL
WBC # BLD AUTO: 17.76 K/UL
WBC NRBC COR # BLD: 15.72 K/UL

## 2017-04-17 PROCEDURE — 94640 AIRWAY INHALATION TREATMENT: CPT

## 2017-04-17 PROCEDURE — 80053 COMPREHEN METABOLIC PANEL: CPT

## 2017-04-17 PROCEDURE — 85027 COMPLETE CBC AUTOMATED: CPT

## 2017-04-17 PROCEDURE — 63600175 PHARM REV CODE 636 W HCPCS: Performed by: PHYSICIAN ASSISTANT

## 2017-04-17 PROCEDURE — 85007 BL SMEAR W/DIFF WBC COUNT: CPT

## 2017-04-17 PROCEDURE — 63600175 PHARM REV CODE 636 W HCPCS: Performed by: HOSPITALIST

## 2017-04-17 PROCEDURE — 83735 ASSAY OF MAGNESIUM: CPT

## 2017-04-17 PROCEDURE — 25000003 PHARM REV CODE 250: Performed by: HOSPITALIST

## 2017-04-17 PROCEDURE — 36415 COLL VENOUS BLD VENIPUNCTURE: CPT

## 2017-04-17 PROCEDURE — 94761 N-INVAS EAR/PLS OXIMETRY MLT: CPT

## 2017-04-17 PROCEDURE — 99233 SBSQ HOSP IP/OBS HIGH 50: CPT | Mod: ,,, | Performed by: HOSPITALIST

## 2017-04-17 PROCEDURE — 81003 URINALYSIS AUTO W/O SCOPE: CPT

## 2017-04-17 PROCEDURE — 84100 ASSAY OF PHOSPHORUS: CPT

## 2017-04-17 PROCEDURE — 27000221 HC OXYGEN, UP TO 24 HOURS

## 2017-04-17 PROCEDURE — 25000242 PHARM REV CODE 250 ALT 637 W/ HCPCS: Performed by: HOSPITALIST

## 2017-04-17 PROCEDURE — 11000001 HC ACUTE MED/SURG PRIVATE ROOM

## 2017-04-17 RX ORDER — HYDROMORPHONE HYDROCHLORIDE 1 MG/ML
1 INJECTION, SOLUTION INTRAMUSCULAR; INTRAVENOUS; SUBCUTANEOUS ONCE
Status: COMPLETED | OUTPATIENT
Start: 2017-04-17 | End: 2017-04-17

## 2017-04-17 RX ORDER — HYDROMORPHONE HYDROCHLORIDE 1 MG/ML
1 INJECTION, SOLUTION INTRAMUSCULAR; INTRAVENOUS; SUBCUTANEOUS
Status: DISPENSED | OUTPATIENT
Start: 2017-04-17 | End: 2017-04-18

## 2017-04-17 RX ORDER — NALOXONE HCL 0.4 MG/ML
0.02 VIAL (ML) INJECTION
Status: DISCONTINUED | OUTPATIENT
Start: 2017-04-17 | End: 2017-05-05 | Stop reason: HOSPADM

## 2017-04-17 RX ORDER — AZITHROMYCIN 100 MG/ML
500 INJECTION, POWDER, LYOPHILIZED, FOR SOLUTION INTRAVENOUS DAILY
Status: DISCONTINUED | OUTPATIENT
Start: 2017-04-17 | End: 2017-04-17 | Stop reason: ALTCHOICE

## 2017-04-17 RX ADMIN — IPRATROPIUM BROMIDE AND ALBUTEROL SULFATE 3 ML: .5; 3 SOLUTION RESPIRATORY (INHALATION) at 03:04

## 2017-04-17 RX ADMIN — HYDROMORPHONE HYDROCHLORIDE 2 MG: 2 INJECTION INTRAMUSCULAR; INTRAVENOUS; SUBCUTANEOUS at 01:04

## 2017-04-17 RX ADMIN — SODIUM CHLORIDE: 0.9 INJECTION, SOLUTION INTRAVENOUS at 07:04

## 2017-04-17 RX ADMIN — Medication 3 ML: at 01:04

## 2017-04-17 RX ADMIN — SODIUM CHLORIDE: 0.9 INJECTION, SOLUTION INTRAVENOUS at 12:04

## 2017-04-17 RX ADMIN — STANDARDIZED SENNA CONCENTRATE AND DOCUSATE SODIUM 1 TABLET: 8.6; 5 TABLET, FILM COATED ORAL at 08:04

## 2017-04-17 RX ADMIN — IPRATROPIUM BROMIDE AND ALBUTEROL SULFATE 3 ML: .5; 3 SOLUTION RESPIRATORY (INHALATION) at 08:04

## 2017-04-17 RX ADMIN — IBUPROFEN 400 MG: 400 TABLET, FILM COATED ORAL at 05:04

## 2017-04-17 RX ADMIN — PROMETHAZINE HYDROCHLORIDE 12.5 MG: 12.5 TABLET ORAL at 08:04

## 2017-04-17 RX ADMIN — HYDROMORPHONE HYDROCHLORIDE 2 MG: 2 INJECTION INTRAMUSCULAR; INTRAVENOUS; SUBCUTANEOUS at 07:04

## 2017-04-17 RX ADMIN — PROMETHAZINE HYDROCHLORIDE 12.5 MG: 12.5 TABLET ORAL at 12:04

## 2017-04-17 RX ADMIN — BENZONATATE 100 MG: 100 CAPSULE ORAL at 10:04

## 2017-04-17 RX ADMIN — HEPARIN SODIUM 5000 UNITS: 5000 INJECTION, SOLUTION INTRAVENOUS; SUBCUTANEOUS at 05:04

## 2017-04-17 RX ADMIN — HYDROMORPHONE HYDROCHLORIDE 2 MG: 2 INJECTION INTRAMUSCULAR; INTRAVENOUS; SUBCUTANEOUS at 10:04

## 2017-04-17 RX ADMIN — HEPARIN SODIUM 5000 UNITS: 5000 INJECTION, SOLUTION INTRAVENOUS; SUBCUTANEOUS at 10:04

## 2017-04-17 RX ADMIN — IPRATROPIUM BROMIDE AND ALBUTEROL SULFATE 3 ML: .5; 3 SOLUTION RESPIRATORY (INHALATION) at 09:04

## 2017-04-17 RX ADMIN — OXYCODONE HYDROCHLORIDE 20 MG: 5 TABLET ORAL at 08:04

## 2017-04-17 RX ADMIN — DIPHENHYDRAMINE HYDROCHLORIDE 25 MG: 50 INJECTION, SOLUTION INTRAMUSCULAR; INTRAVENOUS at 07:04

## 2017-04-17 RX ADMIN — DIPHENHYDRAMINE HYDROCHLORIDE 25 MG: 50 INJECTION, SOLUTION INTRAMUSCULAR; INTRAVENOUS at 08:04

## 2017-04-17 RX ADMIN — BACLOFEN 10 MG: 10 TABLET ORAL at 08:04

## 2017-04-17 RX ADMIN — DIPHENHYDRAMINE HYDROCHLORIDE 25 MG: 50 INJECTION, SOLUTION INTRAMUSCULAR; INTRAVENOUS at 01:04

## 2017-04-17 RX ADMIN — PROMETHAZINE HYDROCHLORIDE 12.5 MG: 12.5 TABLET ORAL at 07:04

## 2017-04-17 RX ADMIN — OXYCODONE HYDROCHLORIDE 20 MG: 5 TABLET ORAL at 05:04

## 2017-04-17 RX ADMIN — PROMETHAZINE HYDROCHLORIDE 12.5 MG: 12.5 TABLET ORAL at 01:04

## 2017-04-17 RX ADMIN — FLUTICASONE PROPIONATE 2 SPRAY: 50 SPRAY, METERED NASAL at 10:04

## 2017-04-17 RX ADMIN — HYDROMORPHONE HYDROCHLORIDE 2 MG: 2 INJECTION INTRAMUSCULAR; INTRAVENOUS; SUBCUTANEOUS at 12:04

## 2017-04-17 RX ADMIN — HEPARIN SODIUM 5000 UNITS: 5000 INJECTION, SOLUTION INTRAVENOUS; SUBCUTANEOUS at 01:04

## 2017-04-17 RX ADMIN — STANDARDIZED SENNA CONCENTRATE AND DOCUSATE SODIUM 1 TABLET: 8.6; 5 TABLET, FILM COATED ORAL at 10:04

## 2017-04-17 RX ADMIN — SODIUM POLYSTYRENE SULFONATE 30 G: 15 SUSPENSION ORAL; RECTAL at 01:04

## 2017-04-17 RX ADMIN — IBUPROFEN 400 MG: 400 TABLET, FILM COATED ORAL at 10:04

## 2017-04-17 RX ADMIN — DIPHENHYDRAMINE HYDROCHLORIDE 25 MG: 50 INJECTION, SOLUTION INTRAMUSCULAR; INTRAVENOUS at 12:04

## 2017-04-17 RX ADMIN — CEFTRIAXONE 1 G: 1 INJECTION, SOLUTION INTRAVENOUS at 11:04

## 2017-04-17 RX ADMIN — AZITHROMYCIN MONOHYDRATE 500 MG: 500 INJECTION, POWDER, LYOPHILIZED, FOR SOLUTION INTRAVENOUS at 01:04

## 2017-04-17 RX ADMIN — BACLOFEN 10 MG: 10 TABLET ORAL at 10:04

## 2017-04-17 RX ADMIN — GABAPENTIN 600 MG: 300 CAPSULE ORAL at 08:04

## 2017-04-17 RX ADMIN — FLUOXETINE 40 MG: 20 CAPSULE ORAL at 10:04

## 2017-04-17 RX ADMIN — AMOXICILLIN AND CLAVULANATE POTASSIUM 1 TABLET: 875; 125 TABLET, FILM COATED ORAL at 10:04

## 2017-04-17 RX ADMIN — HYDROMORPHONE HYDROCHLORIDE 1 MG: 1 INJECTION, SOLUTION INTRAMUSCULAR; INTRAVENOUS; SUBCUTANEOUS at 11:04

## 2017-04-17 RX ADMIN — HYDROMORPHONE HYDROCHLORIDE 1 MG: 1 INJECTION, SOLUTION INTRAMUSCULAR; INTRAVENOUS; SUBCUTANEOUS at 09:04

## 2017-04-17 RX ADMIN — CARBAMAZEPINE 400 MG: 200 TABLET ORAL at 10:04

## 2017-04-17 RX ADMIN — Medication 3 ML: at 10:04

## 2017-04-17 RX ADMIN — IPRATROPIUM BROMIDE AND ALBUTEROL SULFATE 3 ML: .5; 3 SOLUTION RESPIRATORY (INHALATION) at 12:04

## 2017-04-17 RX ADMIN — Medication 3 ML: at 06:04

## 2017-04-17 RX ADMIN — IBUPROFEN 400 MG: 400 TABLET, FILM COATED ORAL at 01:04

## 2017-04-17 RX ADMIN — CETIRIZINE HYDROCHLORIDE 5 MG: 5 TABLET, FILM COATED ORAL at 10:04

## 2017-04-17 RX ADMIN — GABAPENTIN 600 MG: 300 CAPSULE ORAL at 10:04

## 2017-04-17 RX ADMIN — CARBAMAZEPINE 400 MG: 200 TABLET ORAL at 08:04

## 2017-04-17 NOTE — PLAN OF CARE
Future Appointments  Date Time Provider Department Center   4/21/2017 9:00 AM INJECTION, INFECTIOUS DISEASES [facility] Vito Girony       [id number] - [place], [place] - [address]  [address]  [place] [address]  Phone: [phone] Fax: [phone]    Payor: MEDICAID / Plan: [facility] CONNECT / Product Type: Managed Medicaid /        04/17/17 1025   Discharge Assessment   Assessment Type Discharge Planning Assessment   Confirmed/corrected address and phone number on facesheet? Yes   Assessment information obtained from? Patient   Expected Length of Stay (days) 4   Communicated expected length of stay with patient/caregiver yes   Prior to hospitilization cognitive status: Alert/Oriented   Prior to hospitalization functional status: Independent   Current cognitive status: Alert/Oriented   Current Functional Status: Independent   Arrived From home or self-care   Lives With (lives with her mother and two daughters)   Able to Return to Prior Arrangements yes   Is patient able to care for self after discharge? Yes   Who are your caregiver(s) and their phone number(s)? Phyllis Benson  mother [phone]    Patient's perception of discharge disposition home or selfcare   Patient currently receives home health services? No   Patient currently receives any other outside agency services? No   Equipment Currently Used at Home none   Does the patient have transportation to healthcare appointments? Yes   Transportation Available family or friend will provide   On Dialysis? No   Discharge Plan A Home with family   Patient/Family In Agreement With Plan yes

## 2017-04-17 NOTE — PROGRESS NOTES
"Progress Note   Hospital Medicine     Admit Date: 2017  Length of Stay:  LOS: 1 day   Principal Problem:Sickle-cell disease with pain     HPI and Hospital Course: Nazanin Malone is a 37 y.o. female who  has a PMH of Hypertension; Sickle cell-beta thalassemia disease with pain; and Trigeminal neuralgia. The patient presented to [facility] on 2017 due to worsening right leg pain over the last week as well as a cough productive of green mucous. She states she came to ED 2 days ago for her cough and was prescribed Augmentin. She says the mucous is no longer green, but she still feels all "stuffed up and congested." She has had to be admitted a few other times due to sickle pain crisis, but rarely needs transfusions.     Interval History: WBC increased overnight to 17K. Pt complaining of cough and congestion moving to her chest. Continues to complain of BLE pain.     Respiratory: positive for cough and dyspnea on exertion  Cardiovascular: negative  Gastrointestinal: negative  Genitourinary:negative  Neurological: negative    Objective:     Last 24 Hour Vital Signs:  BP  Min: 125/81  Max: 170/88  Temp  Av.5 °F (36.4 °C)  Min: 97.2 °F (36.2 °C)  Max: 97.8 °F (36.6 °C)  Pulse  Av.8  Min: 97  Max: 124  Resp  Av.4  Min: 17  Max: 20  SpO2  Av.2 %  Min: 92 %  Max: 98 %  Body mass index is 54.87 kg/(m^2).  I/O last 3 completed shifts:  In: 300 [P.O.:300]  Out: -     Physical Examination:  General appearance: alert, appears stated age and cooperative  Head: Normocephalic, without obvious abnormality, atraumatic  Neck: no adenopathy, no carotid bruit, no JVD, supple, symmetrical, trachea midline and thyroid not enlarged, symmetric, no tenderness/mass/nodules  Lungs: decreased BS RLL  Heart: regular rate and rhythm, S1, S2 normal, no murmur, click, rub or gallop  Abdomen: soft, non-tender; bowel sounds normal; no masses,  no organomegaly  Extremities: extremities normal, atraumatic, no " cyanosis or edema  Neurologic: Grossly normal      Assessment:     Nazanin Malone is a 37 y.o. female with:  Active Hospital Problems    Diagnosis    *Sickle-cell disease with pain    Bronchitis    Benign essential HTN    Opioid dependence       Plan:     # Sickle Cell pain crisis  # opioid dependence  - pt having complaints typical of her pain crisis.   - Dilaudid 2 mg IV Q3 PRN severe pain and Oxycodone 20 mg Q 4 PRN moderate pain started yesterday and dilaudid weaned to 1mg today.   - Pt takes Oxycodone- acetaminophen 10/325 - 2 tabs Q 4 PRN pain at home.      # Probable Gram negative Pneumonia / CAP  - change Augmentin to Rocephin and Azithromycin  - continue oxygen as needed. Will continue to monitor.      # Benign Essential HTN  - continue home Norvasc  - would add ace if BP stays elevated after adequate pain control     # RLE swelling and pain  - BLE ultrasound negative for DVT    Yamilka Kemp MD  [facility] Medicine Attending  Spectralink: [id number]  04/17/2017

## 2017-04-18 LAB
ALBUMIN SERPL BCP-MCNC: 3.5 G/DL
ALP SERPL-CCNC: 96 U/L
ALT SERPL W/O P-5'-P-CCNC: 94 U/L
ANION GAP SERPL CALC-SCNC: 10 MMOL/L
ANISOCYTOSIS BLD QL SMEAR: SLIGHT
AST SERPL-CCNC: 152 U/L
BASOPHILS # BLD AUTO: ABNORMAL K/UL
BASOPHILS NFR BLD: 0 %
BILIRUB SERPL-MCNC: 0.6 MG/DL
BUN SERPL-MCNC: 9 MG/DL
CALCIUM SERPL-MCNC: 8.7 MG/DL
CHLORIDE SERPL-SCNC: 108 MMOL/L
CO2 SERPL-SCNC: 21 MMOL/L
CREAT SERPL-MCNC: 0.8 MG/DL
DIFFERENTIAL METHOD: ABNORMAL
EOSINOPHIL # BLD AUTO: ABNORMAL K/UL
EOSINOPHIL NFR BLD: 5 %
ERYTHROCYTE [DISTWIDTH] IN BLOOD BY AUTOMATED COUNT: 19.7 %
EST. GFR  (AFRICAN AMERICAN): >60 ML/MIN/1.73 M^2
EST. GFR  (NON AFRICAN AMERICAN): >60 ML/MIN/1.73 M^2
GLUCOSE SERPL-MCNC: 102 MG/DL
HCT VFR BLD AUTO: 34.6 %
HGB BLD-MCNC: 11.5 G/DL
HYPOCHROMIA BLD QL SMEAR: ABNORMAL
LYMPHOCYTES # BLD AUTO: ABNORMAL K/UL
LYMPHOCYTES NFR BLD: 13 %
MAGNESIUM SERPL-MCNC: 2.1 MG/DL
MCH RBC QN AUTO: 22.8 PG
MCHC RBC AUTO-ENTMCNC: 33.2 %
MCV RBC AUTO: 69 FL
MONOCYTES # BLD AUTO: ABNORMAL K/UL
MONOCYTES NFR BLD: 8 %
MYELOCYTES NFR BLD MANUAL: 1 %
NEUTROPHILS NFR BLD: 73 %
NRBC BLD-RTO: ABNORMAL /100 WBC
OVALOCYTES BLD QL SMEAR: ABNORMAL
PHOSPHATE SERPL-MCNC: 3 MG/DL
PLATELET # BLD AUTO: 94 K/UL
PMV BLD AUTO: ABNORMAL FL
POIKILOCYTOSIS BLD QL SMEAR: SLIGHT
POLYCHROMASIA BLD QL SMEAR: ABNORMAL
POTASSIUM SERPL-SCNC: 4.2 MMOL/L
PROT SERPL-MCNC: 7.2 G/DL
RBC # BLD AUTO: 5.05 M/UL
SODIUM SERPL-SCNC: 139 MMOL/L
WBC # BLD AUTO: 10.69 K/UL
WBC NRBC COR # BLD: 9.72 K/UL

## 2017-04-18 PROCEDURE — 99233 SBSQ HOSP IP/OBS HIGH 50: CPT | Mod: ,,, | Performed by: HOSPITALIST

## 2017-04-18 PROCEDURE — 25000003 PHARM REV CODE 250: Performed by: PHYSICIAN ASSISTANT

## 2017-04-18 PROCEDURE — 11000001 HC ACUTE MED/SURG PRIVATE ROOM

## 2017-04-18 PROCEDURE — 94640 AIRWAY INHALATION TREATMENT: CPT

## 2017-04-18 PROCEDURE — 83735 ASSAY OF MAGNESIUM: CPT

## 2017-04-18 PROCEDURE — 25000242 PHARM REV CODE 250 ALT 637 W/ HCPCS: Performed by: HOSPITALIST

## 2017-04-18 PROCEDURE — 63600175 PHARM REV CODE 636 W HCPCS: Performed by: HOSPITALIST

## 2017-04-18 PROCEDURE — 25000003 PHARM REV CODE 250: Performed by: HOSPITALIST

## 2017-04-18 PROCEDURE — 94761 N-INVAS EAR/PLS OXIMETRY MLT: CPT

## 2017-04-18 PROCEDURE — 84100 ASSAY OF PHOSPHORUS: CPT

## 2017-04-18 PROCEDURE — 80053 COMPREHEN METABOLIC PANEL: CPT

## 2017-04-18 PROCEDURE — 85025 COMPLETE CBC W/AUTO DIFF WBC: CPT

## 2017-04-18 PROCEDURE — 36415 COLL VENOUS BLD VENIPUNCTURE: CPT

## 2017-04-18 RX ORDER — HYDROMORPHONE HYDROCHLORIDE 1 MG/ML
1 INJECTION, SOLUTION INTRAMUSCULAR; INTRAVENOUS; SUBCUTANEOUS ONCE
Status: COMPLETED | OUTPATIENT
Start: 2017-04-18 | End: 2017-04-18

## 2017-04-18 RX ORDER — AMLODIPINE BESYLATE 10 MG/1
10 TABLET ORAL DAILY
Status: DISCONTINUED | OUTPATIENT
Start: 2017-04-18 | End: 2017-05-05 | Stop reason: HOSPADM

## 2017-04-18 RX ORDER — HYDROMORPHONE HYDROCHLORIDE 1 MG/ML
1 INJECTION, SOLUTION INTRAMUSCULAR; INTRAVENOUS; SUBCUTANEOUS EVERY 6 HOURS PRN
Status: DISCONTINUED | OUTPATIENT
Start: 2017-04-18 | End: 2017-04-19

## 2017-04-18 RX ORDER — HYDRALAZINE HYDROCHLORIDE 50 MG/1
50 TABLET, FILM COATED ORAL EVERY 6 HOURS PRN
Status: DISCONTINUED | OUTPATIENT
Start: 2017-04-18 | End: 2017-04-28

## 2017-04-18 RX ADMIN — HEPARIN SODIUM 5000 UNITS: 5000 INJECTION, SOLUTION INTRAVENOUS; SUBCUTANEOUS at 08:04

## 2017-04-18 RX ADMIN — FLUTICASONE PROPIONATE 2 SPRAY: 50 SPRAY, METERED NASAL at 08:04

## 2017-04-18 RX ADMIN — AMLODIPINE BESYLATE 10 MG: 10 TABLET ORAL at 08:04

## 2017-04-18 RX ADMIN — HEPARIN SODIUM 5000 UNITS: 5000 INJECTION, SOLUTION INTRAVENOUS; SUBCUTANEOUS at 02:04

## 2017-04-18 RX ADMIN — CARBAMAZEPINE 400 MG: 200 TABLET ORAL at 08:04

## 2017-04-18 RX ADMIN — IBUPROFEN 400 MG: 400 TABLET, FILM COATED ORAL at 02:04

## 2017-04-18 RX ADMIN — IPRATROPIUM BROMIDE AND ALBUTEROL SULFATE 3 ML: .5; 3 SOLUTION RESPIRATORY (INHALATION) at 08:04

## 2017-04-18 RX ADMIN — HEPARIN SODIUM 5000 UNITS: 5000 INJECTION, SOLUTION INTRAVENOUS; SUBCUTANEOUS at 05:04

## 2017-04-18 RX ADMIN — OXYCODONE HYDROCHLORIDE 20 MG: 5 TABLET ORAL at 08:04

## 2017-04-18 RX ADMIN — HYDROMORPHONE HYDROCHLORIDE 1 MG: 1 INJECTION, SOLUTION INTRAMUSCULAR; INTRAVENOUS; SUBCUTANEOUS at 08:04

## 2017-04-18 RX ADMIN — CETIRIZINE HYDROCHLORIDE 5 MG: 5 TABLET, FILM COATED ORAL at 08:04

## 2017-04-18 RX ADMIN — BACLOFEN 10 MG: 10 TABLET ORAL at 08:04

## 2017-04-18 RX ADMIN — HYDRALAZINE HYDROCHLORIDE 50 MG: 50 TABLET ORAL at 08:04

## 2017-04-18 RX ADMIN — PROMETHAZINE HYDROCHLORIDE 12.5 MG: 12.5 TABLET ORAL at 12:04

## 2017-04-18 RX ADMIN — HYDROMORPHONE HYDROCHLORIDE 1 MG: 1 INJECTION, SOLUTION INTRAMUSCULAR; INTRAVENOUS; SUBCUTANEOUS at 04:04

## 2017-04-18 RX ADMIN — OXYCODONE HYDROCHLORIDE 20 MG: 5 TABLET ORAL at 12:04

## 2017-04-18 RX ADMIN — HYDRALAZINE HYDROCHLORIDE 50 MG: 50 TABLET ORAL at 02:04

## 2017-04-18 RX ADMIN — DIPHENHYDRAMINE HYDROCHLORIDE 25 MG: 50 INJECTION, SOLUTION INTRAMUSCULAR; INTRAVENOUS at 12:04

## 2017-04-18 RX ADMIN — BENZONATATE 100 MG: 100 CAPSULE ORAL at 08:04

## 2017-04-18 RX ADMIN — Medication 3 ML: at 05:04

## 2017-04-18 RX ADMIN — GABAPENTIN 600 MG: 300 CAPSULE ORAL at 08:04

## 2017-04-18 RX ADMIN — IPRATROPIUM BROMIDE AND ALBUTEROL SULFATE 3 ML: .5; 3 SOLUTION RESPIRATORY (INHALATION) at 11:04

## 2017-04-18 RX ADMIN — AZITHROMYCIN MONOHYDRATE 500 MG: 500 INJECTION, POWDER, LYOPHILIZED, FOR SOLUTION INTRAVENOUS at 02:04

## 2017-04-18 RX ADMIN — STANDARDIZED SENNA CONCENTRATE AND DOCUSATE SODIUM 1 TABLET: 8.6; 5 TABLET, FILM COATED ORAL at 08:04

## 2017-04-18 RX ADMIN — IBUPROFEN 400 MG: 400 TABLET, FILM COATED ORAL at 08:04

## 2017-04-18 RX ADMIN — DIPHENHYDRAMINE HYDROCHLORIDE 25 MG: 50 INJECTION, SOLUTION INTRAMUSCULAR; INTRAVENOUS at 08:04

## 2017-04-18 RX ADMIN — Medication 3 ML: at 02:04

## 2017-04-18 RX ADMIN — DIPHENHYDRAMINE HYDROCHLORIDE 25 MG: 50 INJECTION, SOLUTION INTRAMUSCULAR; INTRAVENOUS at 04:04

## 2017-04-18 RX ADMIN — CEFTRIAXONE 1 G: 1 INJECTION, SOLUTION INTRAVENOUS at 12:04

## 2017-04-18 RX ADMIN — PROMETHAZINE HYDROCHLORIDE 12.5 MG: 12.5 TABLET ORAL at 04:04

## 2017-04-18 RX ADMIN — IPRATROPIUM BROMIDE AND ALBUTEROL SULFATE 3 ML: .5; 3 SOLUTION RESPIRATORY (INHALATION) at 07:04

## 2017-04-18 RX ADMIN — FLUOXETINE 40 MG: 20 CAPSULE ORAL at 08:04

## 2017-04-18 RX ADMIN — Medication 3 ML: at 08:04

## 2017-04-18 RX ADMIN — HYDROMORPHONE HYDROCHLORIDE 1 MG: 1 INJECTION, SOLUTION INTRAMUSCULAR; INTRAVENOUS; SUBCUTANEOUS at 02:04

## 2017-04-18 RX ADMIN — IBUPROFEN 400 MG: 400 TABLET, FILM COATED ORAL at 05:04

## 2017-04-18 NOTE — PROGRESS NOTES
"Progress Note   Hospital Medicine     Admit Date: 2017  Length of Stay:  LOS: 2 days   Principal Problem:Sickle-cell disease with pain     HPI and Hospital Course: Nazanin Malone is a 37 y.o. female who  has a PMH of Hypertension; Sickle cell-beta thalassemia disease with pain; and Trigeminal neuralgia. The patient presented to [facility] on 2017 due to worsening right leg pain over the last week as well as a cough productive of green mucous. She states she came to ED 2 days ago for her cough and was prescribed Augmentin. She says the mucous is no longer green, but she still feels all "stuffed up and congested." She has had to be admitted a few other times due to sickle pain crisis, but rarely needs transfusions.     Interval History: Patient still complaining of pain and requiring more pain medication. Started on dilaudid IV 1gm q 6 hrs prn.     Respiratory: positive for cough and dyspnea on exertion  Cardiovascular: negative  Gastrointestinal: negative  Genitourinary:negative  Neurological: negative    Objective:     Last 24 Hour Vital Signs:  BP  Min: 147/87  Max: 193/106  Temp  Av.2 °F (36.8 °C)  Min: 97.8 °F (36.6 °C)  Max: 99 °F (37.2 °C)  Pulse  Av.6  Min: 96  Max: 112  Resp  Av  Min: 14  Max: 18  SpO2  Av.5 %  Min: 95 %  Max: 98 %  Body mass index is 54.87 kg/(m^2).  I/O last 3 completed shifts:  In: -   Out: 1950 [Urine:1950]    Physical Examination:  General appearance: alert, appears stated age and cooperative  Head: Normocephalic, without obvious abnormality, atraumatic  Neck: no adenopathy, no carotid bruit, no JVD, supple, symmetrical, trachea midline and thyroid not enlarged, symmetric, no tenderness/mass/nodules  Lungs: decreased BS RLL  Heart: regular rate and rhythm, S1, S2 normal, no murmur, click, rub or gallop  Abdomen: soft, non-tender; bowel sounds normal; no masses,  no organomegaly  Extremities: extremities normal, atraumatic, no cyanosis or " edema  Neurologic: Grossly normal      Assessment:     Nazanin Malone is a 37 y.o. female with:  Active Hospital Problems    Diagnosis    *Sickle-cell disease with pain    Bronchitis    Benign essential HTN    Opioid dependence       Plan:     # Sickle Cell pain crisis  # opioid dependence  - pt having complaints typical of her pain crisis.   - Dilaudid 1 mg IV Q6 PRN severe pain and Oxycodone 20 mg Q 4 PRN moderate pain started yesterday and dilaudid weaned to 1mg today.   - Pt takes Oxycodone- acetaminophen 10/325 - 2 tabs Q 4 PRN pain at home.      # Probable Gram negative Pneumonia / CAP  - change Augmentin to Rocephin and Azithromycin  - continue oxygen as needed. Will continue to monitor.      # Benign Essential HTN  - continue home Norvasc  - would add ace if BP stays elevated after adequate pain control     # RLE swelling and pain  - BLE ultrasound negative for DVT    Miguel Tanner MD  [facility] Attending  Spectralink: [id number]  04/18/2017

## 2017-04-18 NOTE — PHYSICIAN QUERY
PT Name: Nazanin Malone  MR #: [id number]     Physician Query Form - Documentation Clarification      CDS/: Luis Felipe Streeter               Contact information:EXT. [phone]    This form is a permanent document in the medical record.     Query Date: April 18, 2017    By submitting this query, we are merely seeking further clarification of documentation. Please utilize your independent clinical judgment when addressing the question(s) below.    The Medical record reflects the following:    Supporting Clinical Findings Location in Medical Record   HT=5'2    AC=[id number]    BMI=54.87         04/16/17 H&P                                                                                      Doctor, Please specify diagnosis or diagnoses associated with above clinical findings.    Provider Use Only      _____X___Morbid Obesity      ________Other Please Specify Diagnosis(es)__________________________                                                                                                                         [  ] Clinically undetermined

## 2017-04-18 NOTE — PROGRESS NOTES
Notified m.d. On call of bp 193/106, hr= 105. Pt. States that she takes bp meds at home and has not had it since she has been in hospital. Pt. In no distress. DARIAN

## 2017-04-19 LAB
ALBUMIN SERPL BCP-MCNC: 3.5 G/DL
ALP SERPL-CCNC: 97 U/L
ALT SERPL W/O P-5'-P-CCNC: 69 U/L
ANION GAP SERPL CALC-SCNC: 10 MMOL/L
ANISOCYTOSIS BLD QL SMEAR: SLIGHT
AST SERPL-CCNC: 69 U/L
BASOPHILS # BLD AUTO: ABNORMAL K/UL
BASOPHILS NFR BLD: 1 %
BILIRUB SERPL-MCNC: 0.8 MG/DL
BUN SERPL-MCNC: 6 MG/DL
CALCIUM SERPL-MCNC: 9.3 MG/DL
CHLORIDE SERPL-SCNC: 105 MMOL/L
CO2 SERPL-SCNC: 27 MMOL/L
CREAT SERPL-MCNC: 0.7 MG/DL
DIFFERENTIAL METHOD: ABNORMAL
EOSINOPHIL # BLD AUTO: ABNORMAL K/UL
EOSINOPHIL NFR BLD: 6 %
ERYTHROCYTE [DISTWIDTH] IN BLOOD BY AUTOMATED COUNT: 19.6 %
EST. GFR  (AFRICAN AMERICAN): >60 ML/MIN/1.73 M^2
EST. GFR  (NON AFRICAN AMERICAN): >60 ML/MIN/1.73 M^2
GLUCOSE SERPL-MCNC: 88 MG/DL
HCT VFR BLD AUTO: 31.7 %
HGB BLD-MCNC: 10.8 G/DL
HYPOCHROMIA BLD QL SMEAR: ABNORMAL
LYMPHOCYTES # BLD AUTO: ABNORMAL K/UL
LYMPHOCYTES NFR BLD: 30 %
MAGNESIUM SERPL-MCNC: 2 MG/DL
MCH RBC QN AUTO: 22.9 PG
MCHC RBC AUTO-ENTMCNC: 34.1 %
MCV RBC AUTO: 67 FL
MONOCYTES # BLD AUTO: ABNORMAL K/UL
MONOCYTES NFR BLD: 8 %
NEUTROPHILS NFR BLD: 55 %
NRBC BLD-RTO: ABNORMAL /100 WBC
OVALOCYTES BLD QL SMEAR: ABNORMAL
PHOSPHATE SERPL-MCNC: 2.5 MG/DL
PLATELET # BLD AUTO: 112 K/UL
PLATELET BLD QL SMEAR: ABNORMAL
PMV BLD AUTO: 9.4 FL
POIKILOCYTOSIS BLD QL SMEAR: SLIGHT
POLYCHROMASIA BLD QL SMEAR: ABNORMAL
POTASSIUM SERPL-SCNC: 3.8 MMOL/L
PROT SERPL-MCNC: 7.2 G/DL
RBC # BLD AUTO: 4.71 M/UL
SODIUM SERPL-SCNC: 142 MMOL/L
TARGETS BLD QL SMEAR: ABNORMAL
WBC # BLD AUTO: 11.32 K/UL
WBC NRBC COR # BLD: 9.59 K/UL

## 2017-04-19 PROCEDURE — 25000242 PHARM REV CODE 250 ALT 637 W/ HCPCS: Performed by: HOSPITALIST

## 2017-04-19 PROCEDURE — 94761 N-INVAS EAR/PLS OXIMETRY MLT: CPT

## 2017-04-19 PROCEDURE — 63600175 PHARM REV CODE 636 W HCPCS: Performed by: HOSPITALIST

## 2017-04-19 PROCEDURE — 25000003 PHARM REV CODE 250: Performed by: HOSPITALIST

## 2017-04-19 PROCEDURE — 85007 BL SMEAR W/DIFF WBC COUNT: CPT

## 2017-04-19 PROCEDURE — 27000221 HC OXYGEN, UP TO 24 HOURS

## 2017-04-19 PROCEDURE — 25000242 PHARM REV CODE 250 ALT 637 W/ HCPCS: Performed by: NURSE PRACTITIONER

## 2017-04-19 PROCEDURE — 80053 COMPREHEN METABOLIC PANEL: CPT

## 2017-04-19 PROCEDURE — 11000001 HC ACUTE MED/SURG PRIVATE ROOM

## 2017-04-19 PROCEDURE — 99233 SBSQ HOSP IP/OBS HIGH 50: CPT | Mod: ,,, | Performed by: HOSPITALIST

## 2017-04-19 PROCEDURE — 36415 COLL VENOUS BLD VENIPUNCTURE: CPT

## 2017-04-19 PROCEDURE — 83735 ASSAY OF MAGNESIUM: CPT

## 2017-04-19 PROCEDURE — 84100 ASSAY OF PHOSPHORUS: CPT

## 2017-04-19 PROCEDURE — 25000003 PHARM REV CODE 250: Performed by: PHYSICIAN ASSISTANT

## 2017-04-19 PROCEDURE — 85027 COMPLETE CBC AUTOMATED: CPT

## 2017-04-19 PROCEDURE — 94760 N-INVAS EAR/PLS OXIMETRY 1: CPT

## 2017-04-19 PROCEDURE — 94640 AIRWAY INHALATION TREATMENT: CPT

## 2017-04-19 RX ORDER — OXYCODONE AND ACETAMINOPHEN 5; 325 MG/1; MG/1
1 TABLET ORAL EVERY 4 HOURS PRN
Status: DISCONTINUED | OUTPATIENT
Start: 2017-04-19 | End: 2017-04-21

## 2017-04-19 RX ORDER — HYDROMORPHONE HYDROCHLORIDE 1 MG/ML
1 INJECTION, SOLUTION INTRAMUSCULAR; INTRAVENOUS; SUBCUTANEOUS
Status: DISCONTINUED | OUTPATIENT
Start: 2017-04-19 | End: 2017-04-19

## 2017-04-19 RX ORDER — IPRATROPIUM BROMIDE AND ALBUTEROL SULFATE 2.5; .5 MG/3ML; MG/3ML
3 SOLUTION RESPIRATORY (INHALATION) EVERY 4 HOURS PRN
Status: DISCONTINUED | OUTPATIENT
Start: 2017-04-19 | End: 2017-05-05 | Stop reason: HOSPADM

## 2017-04-19 RX ORDER — HYDROMORPHONE HYDROCHLORIDE 1 MG/ML
1.5 INJECTION, SOLUTION INTRAMUSCULAR; INTRAVENOUS; SUBCUTANEOUS
Status: DISCONTINUED | OUTPATIENT
Start: 2017-04-19 | End: 2017-04-21

## 2017-04-19 RX ADMIN — CARBAMAZEPINE 400 MG: 200 TABLET ORAL at 08:04

## 2017-04-19 RX ADMIN — HYDROMORPHONE HYDROCHLORIDE 1.5 MG: 1 INJECTION, SOLUTION INTRAMUSCULAR; INTRAVENOUS; SUBCUTANEOUS at 11:04

## 2017-04-19 RX ADMIN — IBUPROFEN 400 MG: 400 TABLET, FILM COATED ORAL at 04:04

## 2017-04-19 RX ADMIN — AMLODIPINE BESYLATE 10 MG: 10 TABLET ORAL at 08:04

## 2017-04-19 RX ADMIN — CEFTRIAXONE 1 G: 1 INJECTION, SOLUTION INTRAVENOUS at 11:04

## 2017-04-19 RX ADMIN — BACLOFEN 10 MG: 10 TABLET ORAL at 08:04

## 2017-04-19 RX ADMIN — Medication 3 ML: at 10:04

## 2017-04-19 RX ADMIN — IPRATROPIUM BROMIDE AND ALBUTEROL SULFATE 3 ML: .5; 3 SOLUTION RESPIRATORY (INHALATION) at 05:04

## 2017-04-19 RX ADMIN — HYDROMORPHONE HYDROCHLORIDE 1.5 MG: 1 INJECTION, SOLUTION INTRAMUSCULAR; INTRAVENOUS; SUBCUTANEOUS at 08:04

## 2017-04-19 RX ADMIN — PROMETHAZINE HYDROCHLORIDE 12.5 MG: 12.5 TABLET ORAL at 10:04

## 2017-04-19 RX ADMIN — OXYCODONE HYDROCHLORIDE AND ACETAMINOPHEN 1 TABLET: 5; 325 TABLET ORAL at 10:04

## 2017-04-19 RX ADMIN — DIPHENHYDRAMINE HYDROCHLORIDE 25 MG: 50 INJECTION, SOLUTION INTRAMUSCULAR; INTRAVENOUS at 10:04

## 2017-04-19 RX ADMIN — IPRATROPIUM BROMIDE AND ALBUTEROL SULFATE 3 ML: .5; 3 SOLUTION RESPIRATORY (INHALATION) at 03:04

## 2017-04-19 RX ADMIN — DIPHENHYDRAMINE HYDROCHLORIDE 25 MG: 50 INJECTION, SOLUTION INTRAMUSCULAR; INTRAVENOUS at 09:04

## 2017-04-19 RX ADMIN — HYDROMORPHONE HYDROCHLORIDE 1 MG: 1 INJECTION, SOLUTION INTRAMUSCULAR; INTRAVENOUS; SUBCUTANEOUS at 11:04

## 2017-04-19 RX ADMIN — AZITHROMYCIN MONOHYDRATE 500 MG: 500 INJECTION, POWDER, LYOPHILIZED, FOR SOLUTION INTRAVENOUS at 12:04

## 2017-04-19 RX ADMIN — GABAPENTIN 600 MG: 300 CAPSULE ORAL at 08:04

## 2017-04-19 RX ADMIN — HEPARIN SODIUM 5000 UNITS: 5000 INJECTION, SOLUTION INTRAVENOUS; SUBCUTANEOUS at 10:04

## 2017-04-19 RX ADMIN — FLUTICASONE PROPIONATE 2 SPRAY: 50 SPRAY, METERED NASAL at 08:04

## 2017-04-19 RX ADMIN — IBUPROFEN 400 MG: 400 TABLET, FILM COATED ORAL at 10:04

## 2017-04-19 RX ADMIN — Medication 3 ML: at 02:04

## 2017-04-19 RX ADMIN — HYDROMORPHONE HYDROCHLORIDE 1 MG: 1 INJECTION, SOLUTION INTRAMUSCULAR; INTRAVENOUS; SUBCUTANEOUS at 08:04

## 2017-04-19 RX ADMIN — IPRATROPIUM BROMIDE AND ALBUTEROL SULFATE 3 ML: .5; 3 SOLUTION RESPIRATORY (INHALATION) at 11:04

## 2017-04-19 RX ADMIN — PROMETHAZINE HYDROCHLORIDE 12.5 MG: 12.5 TABLET ORAL at 02:04

## 2017-04-19 RX ADMIN — IPRATROPIUM BROMIDE AND ALBUTEROL SULFATE 3 ML: .5; 3 SOLUTION RESPIRATORY (INHALATION) at 07:04

## 2017-04-19 RX ADMIN — IPRATROPIUM BROMIDE AND ALBUTEROL SULFATE 3 ML: .5; 3 SOLUTION RESPIRATORY (INHALATION) at 08:04

## 2017-04-19 RX ADMIN — HYDROMORPHONE HYDROCHLORIDE 1 MG: 1 INJECTION, SOLUTION INTRAMUSCULAR; INTRAVENOUS; SUBCUTANEOUS at 02:04

## 2017-04-19 RX ADMIN — OXYCODONE HYDROCHLORIDE AND ACETAMINOPHEN 1 TABLET: 5; 325 TABLET ORAL at 06:04

## 2017-04-19 RX ADMIN — OXYCODONE HYDROCHLORIDE 20 MG: 5 TABLET ORAL at 04:04

## 2017-04-19 RX ADMIN — FLUOXETINE 40 MG: 20 CAPSULE ORAL at 08:04

## 2017-04-19 RX ADMIN — HYDROMORPHONE HYDROCHLORIDE 1 MG: 1 INJECTION, SOLUTION INTRAMUSCULAR; INTRAVENOUS; SUBCUTANEOUS at 05:04

## 2017-04-19 RX ADMIN — DIPHENHYDRAMINE HYDROCHLORIDE 25 MG: 50 INJECTION, SOLUTION INTRAMUSCULAR; INTRAVENOUS at 02:04

## 2017-04-19 RX ADMIN — STANDARDIZED SENNA CONCENTRATE AND DOCUSATE SODIUM 1 TABLET: 8.6; 5 TABLET, FILM COATED ORAL at 08:04

## 2017-04-19 RX ADMIN — DIPHENHYDRAMINE HYDROCHLORIDE 25 MG: 50 INJECTION, SOLUTION INTRAMUSCULAR; INTRAVENOUS at 04:04

## 2017-04-19 RX ADMIN — HEPARIN SODIUM 5000 UNITS: 5000 INJECTION, SOLUTION INTRAVENOUS; SUBCUTANEOUS at 02:04

## 2017-04-19 RX ADMIN — PROMETHAZINE HYDROCHLORIDE 12.5 MG: 12.5 TABLET ORAL at 12:04

## 2017-04-19 RX ADMIN — PROMETHAZINE HYDROCHLORIDE 12.5 MG: 12.5 TABLET ORAL at 08:04

## 2017-04-19 RX ADMIN — CETIRIZINE HYDROCHLORIDE 5 MG: 5 TABLET, FILM COATED ORAL at 08:04

## 2017-04-19 RX ADMIN — HEPARIN SODIUM 5000 UNITS: 5000 INJECTION, SOLUTION INTRAVENOUS; SUBCUTANEOUS at 04:04

## 2017-04-19 RX ADMIN — Medication 3 ML: at 04:04

## 2017-04-19 RX ADMIN — OXYCODONE HYDROCHLORIDE 20 MG: 5 TABLET ORAL at 08:04

## 2017-04-19 RX ADMIN — IBUPROFEN 400 MG: 400 TABLET, FILM COATED ORAL at 02:04

## 2017-04-19 RX ADMIN — HYDRALAZINE HYDROCHLORIDE 50 MG: 50 TABLET ORAL at 08:04

## 2017-04-19 RX ADMIN — OXYCODONE HYDROCHLORIDE 20 MG: 5 TABLET ORAL at 12:04

## 2017-04-19 NOTE — PLAN OF CARE
Problem: Patient Care Overview  Goal: Plan of Care Review  Outcome: Ongoing (interventions implemented as appropriate)  Patient remains free from falls. Bed in low position, wheels locked, X2 side rails up. Patient remains afebrile. Iv site patent and intact. Patient oriented X 4. Pt constantly complaining of pain, controlled with pRN dilaudid. VSS

## 2017-04-20 LAB
ALBUMIN SERPL BCP-MCNC: 3.5 G/DL
ALP SERPL-CCNC: 91 U/L
ALT SERPL W/O P-5'-P-CCNC: 51 U/L
ANION GAP SERPL CALC-SCNC: 9 MMOL/L
ANISOCYTOSIS BLD QL SMEAR: SLIGHT
AST SERPL-CCNC: 36 U/L
BASOPHILS # BLD AUTO: ABNORMAL K/UL
BASOPHILS NFR BLD: 0 %
BILIRUB SERPL-MCNC: 1 MG/DL
BUN SERPL-MCNC: 6 MG/DL
CALCIUM SERPL-MCNC: 9 MG/DL
CHLORIDE SERPL-SCNC: 103 MMOL/L
CO2 SERPL-SCNC: 29 MMOL/L
CREAT SERPL-MCNC: 0.7 MG/DL
DIFFERENTIAL METHOD: ABNORMAL
EOSINOPHIL # BLD AUTO: ABNORMAL K/UL
EOSINOPHIL NFR BLD: 6 %
ERYTHROCYTE [DISTWIDTH] IN BLOOD BY AUTOMATED COUNT: 19.5 %
EST. GFR  (AFRICAN AMERICAN): >60 ML/MIN/1.73 M^2
EST. GFR  (NON AFRICAN AMERICAN): >60 ML/MIN/1.73 M^2
GLUCOSE SERPL-MCNC: 108 MG/DL
HCT VFR BLD AUTO: 32.3 %
HGB BLD-MCNC: 11 G/DL
HYPOCHROMIA BLD QL SMEAR: ABNORMAL
LYMPHOCYTES # BLD AUTO: ABNORMAL K/UL
LYMPHOCYTES NFR BLD: 35 %
MAGNESIUM SERPL-MCNC: 1.8 MG/DL
MCH RBC QN AUTO: 22.9 PG
MCHC RBC AUTO-ENTMCNC: 34.1 %
MCV RBC AUTO: 67 FL
MONOCYTES # BLD AUTO: ABNORMAL K/UL
MONOCYTES NFR BLD: 16 %
NEUTROPHILS NFR BLD: 43 %
NRBC BLD-RTO: ABNORMAL /100 WBC
OVALOCYTES BLD QL SMEAR: ABNORMAL
PHOSPHATE SERPL-MCNC: 3.2 MG/DL
PLATELET # BLD AUTO: 102 K/UL
PLATELET BLD QL SMEAR: ABNORMAL
PMV BLD AUTO: 8.9 FL
POIKILOCYTOSIS BLD QL SMEAR: SLIGHT
POLYCHROMASIA BLD QL SMEAR: ABNORMAL
POTASSIUM SERPL-SCNC: 3.5 MMOL/L
PROT SERPL-MCNC: 7.5 G/DL
RBC # BLD AUTO: 4.8 M/UL
SODIUM SERPL-SCNC: 141 MMOL/L
TARGETS BLD QL SMEAR: ABNORMAL
WBC # BLD AUTO: 9.53 K/UL
WBC NRBC COR # BLD: 8.51 K/UL

## 2017-04-20 PROCEDURE — 94760 N-INVAS EAR/PLS OXIMETRY 1: CPT

## 2017-04-20 PROCEDURE — 11000001 HC ACUTE MED/SURG PRIVATE ROOM

## 2017-04-20 PROCEDURE — 63600175 PHARM REV CODE 636 W HCPCS: Performed by: HOSPITALIST

## 2017-04-20 PROCEDURE — 83735 ASSAY OF MAGNESIUM: CPT

## 2017-04-20 PROCEDURE — 25000242 PHARM REV CODE 250 ALT 637 W/ HCPCS: Performed by: HOSPITALIST

## 2017-04-20 PROCEDURE — 25000003 PHARM REV CODE 250: Performed by: HOSPITALIST

## 2017-04-20 PROCEDURE — 27000221 HC OXYGEN, UP TO 24 HOURS

## 2017-04-20 PROCEDURE — 99900035 HC TECH TIME PER 15 MIN (STAT)

## 2017-04-20 PROCEDURE — 94664 DEMO&/EVAL PT USE INHALER: CPT

## 2017-04-20 PROCEDURE — 94761 N-INVAS EAR/PLS OXIMETRY MLT: CPT

## 2017-04-20 PROCEDURE — 84100 ASSAY OF PHOSPHORUS: CPT

## 2017-04-20 PROCEDURE — 25000003 PHARM REV CODE 250: Performed by: PHYSICIAN ASSISTANT

## 2017-04-20 PROCEDURE — 94640 AIRWAY INHALATION TREATMENT: CPT

## 2017-04-20 PROCEDURE — 85027 COMPLETE CBC AUTOMATED: CPT

## 2017-04-20 PROCEDURE — 80053 COMPREHEN METABOLIC PANEL: CPT

## 2017-04-20 PROCEDURE — 36415 COLL VENOUS BLD VENIPUNCTURE: CPT

## 2017-04-20 PROCEDURE — 85007 BL SMEAR W/DIFF WBC COUNT: CPT

## 2017-04-20 PROCEDURE — 25000242 PHARM REV CODE 250 ALT 637 W/ HCPCS: Performed by: NURSE PRACTITIONER

## 2017-04-20 PROCEDURE — 99233 SBSQ HOSP IP/OBS HIGH 50: CPT | Mod: ,,, | Performed by: HOSPITALIST

## 2017-04-20 RX ADMIN — HYDROMORPHONE HYDROCHLORIDE 1.5 MG: 1 INJECTION, SOLUTION INTRAMUSCULAR; INTRAVENOUS; SUBCUTANEOUS at 06:04

## 2017-04-20 RX ADMIN — BENZONATATE 100 MG: 100 CAPSULE ORAL at 01:04

## 2017-04-20 RX ADMIN — GABAPENTIN 600 MG: 300 CAPSULE ORAL at 09:04

## 2017-04-20 RX ADMIN — BACLOFEN 10 MG: 10 TABLET ORAL at 08:04

## 2017-04-20 RX ADMIN — Medication 3 ML: at 06:04

## 2017-04-20 RX ADMIN — AMLODIPINE BESYLATE 10 MG: 10 TABLET ORAL at 08:04

## 2017-04-20 RX ADMIN — BENZONATATE 100 MG: 100 CAPSULE ORAL at 06:04

## 2017-04-20 RX ADMIN — HYDROMORPHONE HYDROCHLORIDE 1.5 MG: 1 INJECTION, SOLUTION INTRAMUSCULAR; INTRAVENOUS; SUBCUTANEOUS at 09:04

## 2017-04-20 RX ADMIN — Medication 3 ML: at 01:04

## 2017-04-20 RX ADMIN — IBUPROFEN 400 MG: 400 TABLET, FILM COATED ORAL at 09:04

## 2017-04-20 RX ADMIN — IPRATROPIUM BROMIDE AND ALBUTEROL SULFATE 3 ML: .5; 3 SOLUTION RESPIRATORY (INHALATION) at 07:04

## 2017-04-20 RX ADMIN — CEFTRIAXONE 1 G: 1 INJECTION, SOLUTION INTRAVENOUS at 11:04

## 2017-04-20 RX ADMIN — HYDROMORPHONE HYDROCHLORIDE 1.5 MG: 1 INJECTION, SOLUTION INTRAMUSCULAR; INTRAVENOUS; SUBCUTANEOUS at 07:04

## 2017-04-20 RX ADMIN — AZITHROMYCIN MONOHYDRATE 500 MG: 500 INJECTION, POWDER, LYOPHILIZED, FOR SOLUTION INTRAVENOUS at 12:04

## 2017-04-20 RX ADMIN — GABAPENTIN 600 MG: 300 CAPSULE ORAL at 08:04

## 2017-04-20 RX ADMIN — HEPARIN SODIUM 5000 UNITS: 5000 INJECTION, SOLUTION INTRAVENOUS; SUBCUTANEOUS at 10:04

## 2017-04-20 RX ADMIN — GUAIFENESIN AND DEXTROMETHORPHAN HYDROBROMIDE 1 TABLET: 600; 30 TABLET, EXTENDED RELEASE ORAL at 09:04

## 2017-04-20 RX ADMIN — STANDARDIZED SENNA CONCENTRATE AND DOCUSATE SODIUM 1 TABLET: 8.6; 5 TABLET, FILM COATED ORAL at 09:04

## 2017-04-20 RX ADMIN — IPRATROPIUM BROMIDE AND ALBUTEROL SULFATE 3 ML: .5; 3 SOLUTION RESPIRATORY (INHALATION) at 01:04

## 2017-04-20 RX ADMIN — CARBAMAZEPINE 400 MG: 200 TABLET ORAL at 09:04

## 2017-04-20 RX ADMIN — IBUPROFEN 400 MG: 400 TABLET, FILM COATED ORAL at 06:04

## 2017-04-20 RX ADMIN — Medication 3 ML: at 10:04

## 2017-04-20 RX ADMIN — CARBAMAZEPINE 400 MG: 200 TABLET ORAL at 08:04

## 2017-04-20 RX ADMIN — HEPARIN SODIUM 5000 UNITS: 5000 INJECTION, SOLUTION INTRAVENOUS; SUBCUTANEOUS at 01:04

## 2017-04-20 RX ADMIN — OXYCODONE HYDROCHLORIDE AND ACETAMINOPHEN 1 TABLET: 5; 325 TABLET ORAL at 07:04

## 2017-04-20 RX ADMIN — PROMETHAZINE HYDROCHLORIDE 12.5 MG: 12.5 TABLET ORAL at 04:04

## 2017-04-20 RX ADMIN — IPRATROPIUM BROMIDE AND ALBUTEROL SULFATE 3 ML: .5; 3 SOLUTION RESPIRATORY (INHALATION) at 08:04

## 2017-04-20 RX ADMIN — FLUOXETINE 40 MG: 20 CAPSULE ORAL at 08:04

## 2017-04-20 RX ADMIN — CETIRIZINE HYDROCHLORIDE 5 MG: 5 TABLET, FILM COATED ORAL at 08:04

## 2017-04-20 RX ADMIN — FLUTICASONE PROPIONATE 2 SPRAY: 50 SPRAY, METERED NASAL at 08:04

## 2017-04-20 RX ADMIN — IBUPROFEN 400 MG: 400 TABLET, FILM COATED ORAL at 01:04

## 2017-04-20 RX ADMIN — OXYCODONE HYDROCHLORIDE AND ACETAMINOPHEN 1 TABLET: 5; 325 TABLET ORAL at 09:04

## 2017-04-20 RX ADMIN — HYDROMORPHONE HYDROCHLORIDE 1.5 MG: 1 INJECTION, SOLUTION INTRAMUSCULAR; INTRAVENOUS; SUBCUTANEOUS at 02:04

## 2017-04-20 RX ADMIN — OXYCODONE HYDROCHLORIDE AND ACETAMINOPHEN 1 TABLET: 5; 325 TABLET ORAL at 12:04

## 2017-04-20 RX ADMIN — BACLOFEN 10 MG: 10 TABLET ORAL at 09:04

## 2017-04-20 RX ADMIN — DIPHENHYDRAMINE HYDROCHLORIDE 25 MG: 50 INJECTION, SOLUTION INTRAMUSCULAR; INTRAVENOUS at 12:04

## 2017-04-20 RX ADMIN — OXYCODONE HYDROCHLORIDE AND ACETAMINOPHEN 1 TABLET: 5; 325 TABLET ORAL at 02:04

## 2017-04-20 RX ADMIN — DIPHENHYDRAMINE HYDROCHLORIDE 25 MG: 50 INJECTION, SOLUTION INTRAMUSCULAR; INTRAVENOUS at 07:04

## 2017-04-20 RX ADMIN — HYDROMORPHONE HYDROCHLORIDE 1.5 MG: 1 INJECTION, SOLUTION INTRAMUSCULAR; INTRAVENOUS; SUBCUTANEOUS at 01:04

## 2017-04-20 RX ADMIN — HYDROMORPHONE HYDROCHLORIDE 1.5 MG: 1 INJECTION, SOLUTION INTRAMUSCULAR; INTRAVENOUS; SUBCUTANEOUS at 10:04

## 2017-04-20 RX ADMIN — HEPARIN SODIUM 5000 UNITS: 5000 INJECTION, SOLUTION INTRAVENOUS; SUBCUTANEOUS at 06:04

## 2017-04-20 RX ADMIN — PROMETHAZINE HYDROCHLORIDE 12.5 MG: 12.5 TABLET ORAL at 12:04

## 2017-04-20 RX ADMIN — OXYCODONE HYDROCHLORIDE AND ACETAMINOPHEN 1 TABLET: 5; 325 TABLET ORAL at 04:04

## 2017-04-20 RX ADMIN — DIPHENHYDRAMINE HYDROCHLORIDE 25 MG: 50 INJECTION, SOLUTION INTRAMUSCULAR; INTRAVENOUS at 04:04

## 2017-04-20 RX ADMIN — BENZONATATE 100 MG: 100 CAPSULE ORAL at 12:04

## 2017-04-20 RX ADMIN — HYDROMORPHONE HYDROCHLORIDE 1.5 MG: 1 INJECTION, SOLUTION INTRAMUSCULAR; INTRAVENOUS; SUBCUTANEOUS at 04:04

## 2017-04-20 RX ADMIN — PROMETHAZINE HYDROCHLORIDE 12.5 MG: 12.5 TABLET ORAL at 07:04

## 2017-04-20 RX ADMIN — STANDARDIZED SENNA CONCENTRATE AND DOCUSATE SODIUM 1 TABLET: 8.6; 5 TABLET, FILM COATED ORAL at 08:04

## 2017-04-20 RX ADMIN — IPRATROPIUM BROMIDE AND ALBUTEROL SULFATE 3 ML: .5; 3 SOLUTION RESPIRATORY (INHALATION) at 11:04

## 2017-04-20 RX ADMIN — IPRATROPIUM BROMIDE AND ALBUTEROL SULFATE 3 ML: .5; 3 SOLUTION RESPIRATORY (INHALATION) at 03:04

## 2017-04-20 NOTE — PLAN OF CARE
04/20/17 1305   Right Care Assessment   Can the patient answer the patient profile reliably? Yes, cognitively intact   How often would a person be available to care for the patient? Occasionally   Describe the patient's ability to walk at the present time. No restrictions   How does the patient rate their overall health at the present time? Fair   Number of comorbid conditions (as recorded on the chart) Three   During the past month, has the patient often been bothered by feeling down, depressed or hopeless? No   During the past month, has the patient often been bothered by little interest or pleasure in doing things? No     Discharge pending improvement in pain and oxygen requirements.

## 2017-04-20 NOTE — PLAN OF CARE
Problem: Patient Care Overview  Goal: Plan of Care Review  Outcome: Ongoing (interventions implemented as appropriate)  Safety:  call light in reach, patient oriented to room & instructed how to notify nurse if assistance is needed, questions & concerns addressed, bed in lowest position with wheels locked & side rails up X 2, fall precautions followed, patient free from fall & injury thus far this shift;  VTE/bleeding precautions maintained.  Activity:  patient up with standby assistance, weight shifted at least every other hour.  Neurological:  patient A&O X 4, follows commands, equal  strength & dorsi/plantarflexion, neuro checks performed as ordered & WDL.  Respiratory:  patient tolerates room air without distress while resting, places herself on 3 L NC for comfort after ambulation/activity per ALLEN, patient had one episode moderate wheezing, administered tesslon ang as ordered and called for prn breathing treatment as ordered, patient expressed relief with the combination of these interventions, wheezing dissipated.  Cardiac:  BP hypertensive but patient refuses prn antihypertensive medication.  HR stable.  GI:  Patient tolerates PO intake well, denies nausea but requested phenergan,  LBM 4/17/2017.  :  patient voided dark yellow urine without foul odor spontaneously as well as via straight catheter, bladder scan had revealed 0 prior to cath but patient had been straining to urinate and 500 ml urine was returned, patient tolerated well and expressed relief, adequate output for shift.  Skin: clean and intact, clammy.  Devices:  PIV CDI, negative for s/sx of infection & infiltration.  Pain:  patient received prn pain medication as well as prn phenergan and diphenhydramine.  Patient states she is menstruating.  Will continue to monitor patient.

## 2017-04-20 NOTE — PROGRESS NOTES
"Progress Note   Hospital Medicine     Admit Date: 2017  Length of Stay:  LOS: 3 days   Principal Problem:Sickle-cell disease with pain     HPI and Hospital Course: Nazanin Malone is a 37 y.o. female who  has a PMH of Hypertension; Sickle cell-beta thalassemia disease with pain; and Trigeminal neuralgia. The patient presented to [facility] on 2017 due to worsening right leg pain over the last week as well as a cough productive of green mucous. She states she came to ED 2 days ago for her cough and was prescribed Augmentin. She says the mucous is no longer green, but she still feels all "stuffed up and congested." She has had to be admitted a few other times due to sickle pain crisis, but rarely needs transfusions.     Interval History: Patient still complaining of pain and requiring more pain medication. Started on dilaudid IV 1gm q 6 hrs prn.     Respiratory: positive for cough and dyspnea on exertion  Cardiovascular: negative  Gastrointestinal: negative  Genitourinary:negative  Neurological: negative    Objective:     Last 24 Hour Vital Signs:  BP  Min: 130/79  Max: 174/91  Temp  Av.7 °F (37.1 °C)  Min: 98.1 °F (36.7 °C)  Max: 99.1 °F (37.3 °C)  Pulse  Av.4  Min: 93  Max: 112  Resp  Av  Min: 18  Max: 20  SpO2  Av.9 %  Min: 95 %  Max: 98 %  Body mass index is 54.87 kg/(m^2).       Physical Examination:  General appearance: alert, appears stated age and cooperative  Head: Normocephalic, without obvious abnormality, atraumatic  Neck: no adenopathy, no carotid bruit, no JVD, supple, symmetrical, trachea midline and thyroid not enlarged, symmetric, no tenderness/mass/nodules  Lungs: decreased BS RLL  Heart: regular rate and rhythm, S1, S2 normal, no murmur, click, rub or gallop  Abdomen: soft, non-tender; bowel sounds normal; no masses,  no organomegaly  Extremities: extremities normal, atraumatic, no cyanosis or edema  Neurologic: Grossly normal      Assessment:     Nazanin" Kira is a 37 y.o. female with:  Active Hospital Problems    Diagnosis    *Sickle-cell disease with pain    Bronchitis    Benign essential HTN    Opioid dependence       Plan:     # Sickle Cell pain crisis  # opioid dependence  - pt having complaints typical of her pain crisis.   - Dilaudid 1.5 mg q 4hrs and Percocet PRN     # Probable Gram negative Pneumonia / CAP  - change Augmentin to Rocephin and Azithromycin  - continue oxygen as needed. Will continue to monitor.       # Benign Essential HTN  - continue home Norvasc  - would add ace if BP stays elevated after adequate pain control     # RLE swelling and pain  - BLE ultrasound negative for DVT    Miguel Tanner MD  [facility] Attending  Spectralink: [phone]  04/19/2017

## 2017-04-20 NOTE — PLAN OF CARE
Pt free from injury and in no acute distress. Complains of pain unrelieved by prn meds. Wheels locked, call light in reach, bed in lowest position, side rails up x2. Will continue to monitor.

## 2017-04-21 LAB
ALBUMIN SERPL BCP-MCNC: 3.5 G/DL
ALP SERPL-CCNC: 91 U/L
ALT SERPL W/O P-5'-P-CCNC: 39 U/L
ANION GAP SERPL CALC-SCNC: 10 MMOL/L
ANISOCYTOSIS BLD QL SMEAR: SLIGHT
AST SERPL-CCNC: 30 U/L
BASOPHILS # BLD AUTO: ABNORMAL K/UL
BASOPHILS NFR BLD: 0 %
BILIRUB SERPL-MCNC: 0.7 MG/DL
BUN SERPL-MCNC: 9 MG/DL
CALCIUM SERPL-MCNC: 9.2 MG/DL
CHLORIDE SERPL-SCNC: 105 MMOL/L
CO2 SERPL-SCNC: 25 MMOL/L
CREAT SERPL-MCNC: 0.8 MG/DL
DIFFERENTIAL METHOD: ABNORMAL
EOSINOPHIL # BLD AUTO: ABNORMAL K/UL
EOSINOPHIL NFR BLD: 11 %
ERYTHROCYTE [DISTWIDTH] IN BLOOD BY AUTOMATED COUNT: 21.7 %
EST. GFR  (AFRICAN AMERICAN): >60 ML/MIN/1.73 M^2
EST. GFR  (NON AFRICAN AMERICAN): >60 ML/MIN/1.73 M^2
GLUCOSE SERPL-MCNC: 93 MG/DL
HCT VFR BLD AUTO: 33.5 %
HGB BLD-MCNC: 11 G/DL
HYPOCHROMIA BLD QL SMEAR: ABNORMAL
LYMPHOCYTES # BLD AUTO: ABNORMAL K/UL
LYMPHOCYTES NFR BLD: 15 %
MAGNESIUM SERPL-MCNC: 2 MG/DL
MCH RBC QN AUTO: 23 PG
MCHC RBC AUTO-ENTMCNC: 32.8 %
MCV RBC AUTO: 70 FL
MONOCYTES # BLD AUTO: ABNORMAL K/UL
MONOCYTES NFR BLD: 8 %
MYELOCYTES NFR BLD MANUAL: 2 %
NEUTROPHILS NFR BLD: 64 %
NRBC BLD-RTO: ABNORMAL /100 WBC
OVALOCYTES BLD QL SMEAR: ABNORMAL
PHOSPHATE SERPL-MCNC: 4.2 MG/DL
PLATELET # BLD AUTO: 94 K/UL
PMV BLD AUTO: ABNORMAL FL
POIKILOCYTOSIS BLD QL SMEAR: SLIGHT
POLYCHROMASIA BLD QL SMEAR: ABNORMAL
POTASSIUM SERPL-SCNC: 4.2 MMOL/L
PROT SERPL-MCNC: 7.4 G/DL
RBC # BLD AUTO: 4.78 M/UL
SODIUM SERPL-SCNC: 140 MMOL/L
WBC # BLD AUTO: 10.39 K/UL
WBC NRBC COR # BLD: 8.59 K/UL

## 2017-04-21 PROCEDURE — 11000001 HC ACUTE MED/SURG PRIVATE ROOM

## 2017-04-21 PROCEDURE — 99233 SBSQ HOSP IP/OBS HIGH 50: CPT | Mod: ,,, | Performed by: HOSPITALIST

## 2017-04-21 PROCEDURE — 36415 COLL VENOUS BLD VENIPUNCTURE: CPT

## 2017-04-21 PROCEDURE — 25000242 PHARM REV CODE 250 ALT 637 W/ HCPCS: Performed by: NURSE PRACTITIONER

## 2017-04-21 PROCEDURE — 27000221 HC OXYGEN, UP TO 24 HOURS

## 2017-04-21 PROCEDURE — 25000003 PHARM REV CODE 250: Performed by: PHYSICIAN ASSISTANT

## 2017-04-21 PROCEDURE — 63600175 PHARM REV CODE 636 W HCPCS: Performed by: HOSPITALIST

## 2017-04-21 PROCEDURE — 83735 ASSAY OF MAGNESIUM: CPT

## 2017-04-21 PROCEDURE — 25000242 PHARM REV CODE 250 ALT 637 W/ HCPCS: Performed by: HOSPITALIST

## 2017-04-21 PROCEDURE — 94640 AIRWAY INHALATION TREATMENT: CPT

## 2017-04-21 PROCEDURE — 85007 BL SMEAR W/DIFF WBC COUNT: CPT

## 2017-04-21 PROCEDURE — 80053 COMPREHEN METABOLIC PANEL: CPT

## 2017-04-21 PROCEDURE — 99233 SBSQ HOSP IP/OBS HIGH 50: CPT | Mod: ,,, | Performed by: INTERNAL MEDICINE

## 2017-04-21 PROCEDURE — 25000003 PHARM REV CODE 250: Performed by: HOSPITALIST

## 2017-04-21 PROCEDURE — 85027 COMPLETE CBC AUTOMATED: CPT

## 2017-04-21 PROCEDURE — 84100 ASSAY OF PHOSPHORUS: CPT

## 2017-04-21 RX ORDER — OXYCODONE HYDROCHLORIDE 5 MG/1
10 TABLET ORAL EVERY 4 HOURS PRN
Status: DISCONTINUED | OUTPATIENT
Start: 2017-04-21 | End: 2017-04-28

## 2017-04-21 RX ORDER — HYDROMORPHONE HYDROCHLORIDE 2 MG/ML
2 INJECTION, SOLUTION INTRAMUSCULAR; INTRAVENOUS; SUBCUTANEOUS
Status: DISCONTINUED | OUTPATIENT
Start: 2017-04-21 | End: 2017-04-22

## 2017-04-21 RX ORDER — DIPHENHYDRAMINE HYDROCHLORIDE 50 MG/ML
12.5 INJECTION INTRAMUSCULAR; INTRAVENOUS EVERY 6 HOURS PRN
Status: DISCONTINUED | OUTPATIENT
Start: 2017-04-21 | End: 2017-04-25

## 2017-04-21 RX ORDER — SODIUM CHLORIDE 9 MG/ML
INJECTION, SOLUTION INTRAVENOUS CONTINUOUS
Status: DISCONTINUED | OUTPATIENT
Start: 2017-04-21 | End: 2017-05-05 | Stop reason: HOSPADM

## 2017-04-21 RX ORDER — HYDROMORPHONE HYDROCHLORIDE 2 MG/ML
2 INJECTION, SOLUTION INTRAMUSCULAR; INTRAVENOUS; SUBCUTANEOUS ONCE
Status: COMPLETED | OUTPATIENT
Start: 2017-04-21 | End: 2017-04-21

## 2017-04-21 RX ADMIN — GUAIFENESIN AND DEXTROMETHORPHAN HYDROBROMIDE 1 TABLET: 600; 30 TABLET, EXTENDED RELEASE ORAL at 09:04

## 2017-04-21 RX ADMIN — FLUOXETINE 40 MG: 20 CAPSULE ORAL at 08:04

## 2017-04-21 RX ADMIN — CARBAMAZEPINE 400 MG: 200 TABLET ORAL at 08:04

## 2017-04-21 RX ADMIN — AZITHROMYCIN MONOHYDRATE 500 MG: 500 INJECTION, POWDER, LYOPHILIZED, FOR SOLUTION INTRAVENOUS at 01:04

## 2017-04-21 RX ADMIN — IBUPROFEN 400 MG: 400 TABLET, FILM COATED ORAL at 09:04

## 2017-04-21 RX ADMIN — DIPHENHYDRAMINE HYDROCHLORIDE 25 MG: 50 INJECTION, SOLUTION INTRAMUSCULAR; INTRAVENOUS at 10:04

## 2017-04-21 RX ADMIN — Medication 3 ML: at 02:04

## 2017-04-21 RX ADMIN — DIPHENHYDRAMINE HYDROCHLORIDE 12.5 MG: 50 INJECTION, SOLUTION INTRAMUSCULAR; INTRAVENOUS at 08:04

## 2017-04-21 RX ADMIN — Medication 3 ML: at 10:04

## 2017-04-21 RX ADMIN — CETIRIZINE HYDROCHLORIDE 5 MG: 5 TABLET, FILM COATED ORAL at 08:04

## 2017-04-21 RX ADMIN — PROMETHAZINE HYDROCHLORIDE 12.5 MG: 12.5 TABLET ORAL at 08:04

## 2017-04-21 RX ADMIN — BENZONATATE 100 MG: 100 CAPSULE ORAL at 05:04

## 2017-04-21 RX ADMIN — BENZONATATE 100 MG: 100 CAPSULE ORAL at 02:04

## 2017-04-21 RX ADMIN — STANDARDIZED SENNA CONCENTRATE AND DOCUSATE SODIUM 1 TABLET: 8.6; 5 TABLET, FILM COATED ORAL at 08:04

## 2017-04-21 RX ADMIN — HYDROMORPHONE HYDROCHLORIDE 2 MG: 2 INJECTION INTRAMUSCULAR; INTRAVENOUS; SUBCUTANEOUS at 08:04

## 2017-04-21 RX ADMIN — HYDROMORPHONE HYDROCHLORIDE 1.5 MG: 1 INJECTION, SOLUTION INTRAMUSCULAR; INTRAVENOUS; SUBCUTANEOUS at 04:04

## 2017-04-21 RX ADMIN — OXYCODONE HYDROCHLORIDE 10 MG: 5 TABLET ORAL at 09:04

## 2017-04-21 RX ADMIN — DIPHENHYDRAMINE HYDROCHLORIDE 12.5 MG: 50 INJECTION, SOLUTION INTRAMUSCULAR; INTRAVENOUS at 02:04

## 2017-04-21 RX ADMIN — IPRATROPIUM BROMIDE AND ALBUTEROL SULFATE 3 ML: .5; 3 SOLUTION RESPIRATORY (INHALATION) at 03:04

## 2017-04-21 RX ADMIN — Medication 3 ML: at 05:04

## 2017-04-21 RX ADMIN — GABAPENTIN 600 MG: 300 CAPSULE ORAL at 08:04

## 2017-04-21 RX ADMIN — BACLOFEN 10 MG: 10 TABLET ORAL at 09:04

## 2017-04-21 RX ADMIN — HEPARIN SODIUM 5000 UNITS: 5000 INJECTION, SOLUTION INTRAVENOUS; SUBCUTANEOUS at 01:04

## 2017-04-21 RX ADMIN — DIPHENHYDRAMINE HYDROCHLORIDE 25 MG: 50 INJECTION, SOLUTION INTRAMUSCULAR; INTRAVENOUS at 04:04

## 2017-04-21 RX ADMIN — HYDROMORPHONE HYDROCHLORIDE 1.5 MG: 1 INJECTION, SOLUTION INTRAMUSCULAR; INTRAVENOUS; SUBCUTANEOUS at 11:04

## 2017-04-21 RX ADMIN — HYDROMORPHONE HYDROCHLORIDE 2 MG: 2 INJECTION INTRAMUSCULAR; INTRAVENOUS; SUBCUTANEOUS at 02:04

## 2017-04-21 RX ADMIN — HEPARIN SODIUM 5000 UNITS: 5000 INJECTION, SOLUTION INTRAVENOUS; SUBCUTANEOUS at 05:04

## 2017-04-21 RX ADMIN — OXYCODONE HYDROCHLORIDE 10 MG: 5 TABLET ORAL at 03:04

## 2017-04-21 RX ADMIN — IPRATROPIUM BROMIDE AND ALBUTEROL SULFATE 3 ML: .5; 3 SOLUTION RESPIRATORY (INHALATION) at 06:04

## 2017-04-21 RX ADMIN — SODIUM CHLORIDE: 0.9 INJECTION, SOLUTION INTRAVENOUS at 05:04

## 2017-04-21 RX ADMIN — IBUPROFEN 400 MG: 400 TABLET, FILM COATED ORAL at 01:04

## 2017-04-21 RX ADMIN — OXYCODONE HYDROCHLORIDE AND ACETAMINOPHEN 1 TABLET: 5; 325 TABLET ORAL at 10:04

## 2017-04-21 RX ADMIN — FLUTICASONE PROPIONATE 2 SPRAY: 50 SPRAY, METERED NASAL at 08:04

## 2017-04-21 RX ADMIN — CEFTRIAXONE 1 G: 1 INJECTION, SOLUTION INTRAVENOUS at 11:04

## 2017-04-21 RX ADMIN — HYDROMORPHONE HYDROCHLORIDE 1.5 MG: 1 INJECTION, SOLUTION INTRAMUSCULAR; INTRAVENOUS; SUBCUTANEOUS at 08:04

## 2017-04-21 RX ADMIN — BACLOFEN 10 MG: 10 TABLET ORAL at 08:04

## 2017-04-21 RX ADMIN — PROMETHAZINE HYDROCHLORIDE 12.5 MG: 12.5 TABLET ORAL at 10:04

## 2017-04-21 RX ADMIN — AMLODIPINE BESYLATE 10 MG: 10 TABLET ORAL at 08:04

## 2017-04-21 RX ADMIN — PROMETHAZINE HYDROCHLORIDE 12.5 MG: 12.5 TABLET ORAL at 04:04

## 2017-04-21 RX ADMIN — OXYCODONE HYDROCHLORIDE AND ACETAMINOPHEN 1 TABLET: 5; 325 TABLET ORAL at 05:04

## 2017-04-21 RX ADMIN — GUAIFENESIN AND DEXTROMETHORPHAN HYDROBROMIDE 1 TABLET: 600; 30 TABLET, EXTENDED RELEASE ORAL at 11:04

## 2017-04-21 RX ADMIN — IBUPROFEN 400 MG: 400 TABLET, FILM COATED ORAL at 05:04

## 2017-04-21 RX ADMIN — HEPARIN SODIUM 5000 UNITS: 5000 INJECTION, SOLUTION INTRAVENOUS; SUBCUTANEOUS at 09:04

## 2017-04-21 RX ADMIN — HYDROMORPHONE HYDROCHLORIDE 2 MG: 2 INJECTION INTRAMUSCULAR; INTRAVENOUS; SUBCUTANEOUS at 04:04

## 2017-04-21 RX ADMIN — IPRATROPIUM BROMIDE AND ALBUTEROL SULFATE 3 ML: .5; 3 SOLUTION RESPIRATORY (INHALATION) at 11:04

## 2017-04-21 NOTE — PROGRESS NOTES
"Progress Note   Hospital Medicine     Admit Date: 2017  Length of Stay:  LOS: 4 days   Principal Problem:Sickle-cell disease with pain     HPI and Hospital Course: Nazanin Malone is a 37 y.o. female who  has a PMH of Hypertension; Sickle cell-beta thalassemia disease with pain; and Trigeminal neuralgia. The patient presented to [facility] on 2017 due to worsening right leg pain over the last week as well as a cough productive of green mucous. She states she came to ED 2 days ago for her cough and was prescribed Augmentin. She says the mucous is no longer green, but she still feels all "stuffed up and congested." She has had to be admitted a few other times due to sickle pain crisis, but rarely needs transfusions.     Interval History: Patient reports pain is better controlled. Added mucinex to control cough.     Respiratory: positive for cough and dyspnea on exertion  Cardiovascular: negative  Gastrointestinal: negative  Genitourinary:negative  Neurological: negative    Objective:     Last 24 Hour Vital Signs:  BP  Min: 140/94  Max: 168/98  Temp  Av.5 °F (36.9 °C)  Min: 97.5 °F (36.4 °C)  Max: 99.3 °F (37.4 °C)  Pulse  Av.4  Min: 93  Max: 110  Resp  Av.8  Min: 12  Max: 20  SpO2  Av.1 %  Min: 92 %  Max: 98 %  Body mass index is 54.87 kg/(m^2).  I/O last 3 completed shifts:  In:  [P.O.:2000]  Out: 500 [Urine:500]    Physical Examination:  General appearance: alert, appears stated age and cooperative  Head: Normocephalic, without obvious abnormality, atraumatic  Neck: no adenopathy, no carotid bruit, no JVD, supple, symmetrical, trachea midline and thyroid not enlarged, symmetric, no tenderness/mass/nodules  Lungs: decreased BS RLL  Heart: regular rate and rhythm, S1, S2 normal, no murmur, click, rub or gallop  Abdomen: soft, non-tender; bowel sounds normal; no masses,  no organomegaly  Extremities: extremities normal, atraumatic, no cyanosis or edema  Neurologic: Grossly " normal      Assessment:     Nazanin Malone is a 37 y.o. female with:  Active Hospital Problems    Diagnosis    *Sickle-cell disease with pain    Bronchitis    Benign essential HTN    Opioid dependence       Plan:     # Sickle Cell pain crisis  # opioid dependence  - pt having complaints typical of her pain crisis.   - Dilaudid 1.5 mg q 4hrs and Percocet PRN     # Probable Gram negative Pneumonia / CAP  - On Rocephin and Azithromycin  - continue oxygen as needed. Will continue to monitor.    - added mucinex for cough suppresant  - acapella every 4 hrs PRN      # Benign Essential HTN  - continue home Norvasc  - would add ace if BP stays elevated after adequate pain control     # RLE swelling and pain  - BLE ultrasound negative for DVT    Miguel Tanner MD  Hospital Medicine Attending  Spectralink: [id number]  04/20/2017

## 2017-04-21 NOTE — CONSULTS
"CONSULT NOTE  Hematology/Oncology        Consult Requested By: Miguel Tanner MD  Reason for Consult: R/O Acute chest syndrome    SUBJECTIVE:     History of Present Illness:  Nazanin Malone is a 37 y.o. female who has a PMH of Hypertension; Sickle cell-beta thalassemia disease with pain; and Trigeminal neuralgia. The patient presented to [facility] on 4/16/2017 due to worsening right leg pain over the last week as well as a cough productive of green mucous. She states she came to ED 2 days ago for her cough and was prescribed Augmentin. She says the mucous is no longer green, but she still feels all "stuffed up and congested." She has had to be admitted a few other times due to sickle pain crisis, but rarely needs transfusions. She is seen by Dr. Montgomery in clinic for her sickle cell anemia, but is currently not receiving any medications with the exception of PRN percocet given her low recurrence of sickle cell crisises.     Over the course of her hospitalization, her cough has improved and has "almost gone completely away" according to the pt, however he R leg pain has progressed now to the point that she describes it as a constant 10/10 pain which "feels like [her] leg is being pulled out of its socket". She describes the pain as sharp, does not radiate, and extends from her ankle to her hip and a little superior to her R iliac crest. She describes some SOB but nothing extreme, denies any cough or mucous production, and denies any fevers and chills. Hematology / Oncology was consulted for possible acute chest syndrome.     Continuous Infusions:   Scheduled Meds:   albuterol-ipratropium 2.5mg-0.5mg/3mL  3 mL Nebulization Q4H WAKE    amlodipine  10 mg Oral Daily    azithromycin (ZITHROMAX) 500 mg IVPB  500 mg Intravenous Q24H    baclofen  10 mg Oral BID    carbamazepine  400 mg Oral BID    cefTRIAXone (ROCEPHIN) IVPB  1 g Intravenous Q24H    cetirizine  5 mg Oral Daily    " dextromethorphan-guaifenesin  mg  1 tablet Oral BID    fluoxetine  40 mg Oral Daily    fluticasone  2 spray Each Nare Daily    gabapentin  600 mg Oral BID    heparin (porcine)  5,000 Units Subcutaneous Q8H    ibuprofen  400 mg Oral Q8H    senna-docusate 8.6-50 mg  1 tablet Oral BID    sodium chloride 0.9%  3 mL Intravenous Q8H     PRN Meds:albuterol-ipratropium 2.5mg-0.5mg/3mL, benzonatate, diphenhydrAMINE, INV hydrALAZINE, HYDROmorphone, naloxone, oxycodone, promethazine    Past Medical History:   Diagnosis Date    Hypertension     Sickle cell-beta thalassemia disease with pain     Trigeminal neuralgia      Past Surgical History:   Procedure Laterality Date     SECTION      TUBAL LIGATION       History reviewed. No pertinent family history.  Social History   Substance Use Topics    Smoking status: Never Smoker    Smokeless tobacco: None    Alcohol use None       Review of patient's allergies indicates:   Allergen Reactions    Morphine Itching        Review of Systems:  Constitutional: no fever or chills  Eyes: no visual changes, negative for redness and visual disturbance  Respiratory: no cough or shortness of breath, negative for cough, hemoptysis, pleurisy/chest pain, sputum and wheezing  Cardiovascular: no chest pain or palpitations, negative for dyspnea and palpitations  Gastrointestinal: no nausea or vomiting, no abdominal pain or change in bowel habits  Hematologic/Lymphatic: no easy bruising or lymphadenopathy  Musculoskeletal: positive for pain in R leg extending from ankle to hip    OBJECTIVE:     Vital Signs (Most Recent)  Temp: 98.5 °F (36.9 °C) (17 1145)  Pulse: 100 (17 1145)  Resp: 18 (17 1145)  BP: (!) 145/75 (17 1145)  SpO2: 98 % (17 1145)    Pain Assessment: 10    Vital Signs Range (Last 24H):  Temp:  [97.5 °F (36.4 °C)-98.7 °F (37.1 °C)]   Pulse:  [100-106]   Resp:  [12-20]   BP: (139-162)/(75-94)   SpO2:  [93 %-99 %]     Physical  Exam:  General appearance: overweight female in significant distress, alert, appears stated age and cooperative  Head: Normocephalic, without obvious abnormality, atraumatic  Neck: no adenopathy, no carotid bruit, no JVD, supple, symmetrical, trachea midline and thyroid not enlarged, symmetric, no tenderness/mass/nodules  Lungs: decreased BS throughout all lung fields. No crackles or wheezes appreciated.   Heart: Quiet precordium. Tachycardic with regular rhythm. Normal S1, S2 normal, no murmur, click, rub or gallop  Abdomen: soft, non-tender; bowel sounds normal; no masses, no organomegaly  Extremities: extremities normal, atraumatic, no cyanosis or edema. Trace edema in the ankles bilaterally. Slight pain to flexion / extension and lateral movement of the R ankle. Unable to flex or extend the leg at the knee 2/2 extreme pain. Extremely tender to palpation over the R illiac crest. Tender to palpation over the lateral and front part of the R knee. No associated effusion or erythema.  Neurologic: Grossly normal     Laboratory:  CBC with Differential:    Recent Labs  Lab 04/21/17  0409   WBC 10.39   LYMPH 15.0*  CANCELED   BASOPHIL 0.0   RBC 4.78   HCT 33.5*   HGB 11.0*   MCV 70*   MCH 23.0*   RDW 21.7*   PLT 94*   MPV SEE COMMENT     CMP:    Recent Labs  Lab 04/21/17  0409   GLU 93   CALCIUM 9.2   ALBUMIN 3.5   PROT 7.4      K 4.2   CO2 25      BUN 9   CREATININE 0.8   ALKPHOS 91   ALT 39   AST 30   BILITOT 0.7     Coagulation: No results for input(s): INR, APTT in the last 24 hours.    Invalid input(s): PT    Diagnostic Results:  Labs: Reviewed  ECG: Reviewed  X-Ray: Reviewed  ----    ASSESSMENT/PLAN:     Current Problems List:  Active Hospital Problems    Diagnosis  POA    *Sickle-cell disease with pain [D57.819]  Yes    Bronchitis [J40]  Yes    Benign essential HTN [I10]  Yes    Opioid dependence [F11.20]  Yes      Resolved Hospital Problems    Diagnosis Date Resolved POA   No resolved problems to  display.        Ms. Nazanin Malone is a 37 y.o. female with a hx of Sickle cell disease / beta thalassemia and hypertension who presents with R leg pain which has progressed over her hospitalization currently being worked up for sickle cell crisis and possible acute chest syndrome.    Problem Assessment & Recommendations:    -Given the pts lack of fever, good SpO2 on 3L NC (98%), and general lack of chest pain or respiratory symptoms (cough and sputum production have resolved), we feel like there is a low likelihood of acute chest syndrome.   -CXR taken four days ago was not concerning for a new infiltrate, however a better inspiratory film needs to be taken in order to r/o pathology.    -Pt in significant amount of leg pain which has been progressing. Even right after 2 mg dilaudid injection, pt completely alert and still complaining of pain and unable to move leg in the slightest. .   -At this time no concern for acute chest syndrome. Recommend increased pain control - perhaps putting pt on PCA pump.     Thank you for the consult. Please feel free to contact us with additional questions or if the pts clinical status changes.     MARIE Clark MD  PGY-1

## 2017-04-21 NOTE — PROGRESS NOTES
"Progress Note   Hospital Medicine     Admit Date: 2017  Length of Stay:  LOS: 5 days   Principal Problem:Sickle-cell disease with pain     HPI and Hospital Course: Nazanin Malone is a 37 y.o. female who  has a PMH of Hypertension; Sickle cell-beta thalassemia disease with pain; and Trigeminal neuralgia. The patient presented to [facility] on 2017 due to worsening right leg pain over the last week as well as a cough productive of green mucous. She states she came to ED 2 days ago for her cough and was prescribed Augmentin. She says the mucous is no longer green, but she still feels all "stuffed up and congested." She has had to be admitted a few other times due to sickle pain crisis, but rarely needs transfusions.     Interval History: Patient reports excruciating pain despite opioid use, seen by Hematology who recommends MRI hip to rule out avascular necrosis     Respiratory: positive for cough and dyspnea on exertion  Cardiovascular: negative  Gastrointestinal: negative  Genitourinary:negative  Neurological: negative    Objective:     Last 24 Hour Vital Signs:  BP  Min: 139/90  Max: 162/89  Temp  Av.3 °F (36.8 °C)  Min: 97.8 °F (36.6 °C)  Max: 98.7 °F (37.1 °C)  Pulse  Av.3  Min: 99  Max: 104  Resp  Av.2  Min: 16  Max: 20  SpO2  Av.3 %  Min: 93 %  Max: 100 %  Body mass index is 54.87 kg/(m^2).  I/O last 3 completed shifts:  In: 2700 [P.O.:2700]  Out: 500 [Urine:500]    Physical Examination:  General appearance: alert, appears stated age and cooperative  Head: Normocephalic, without obvious abnormality, atraumatic  Neck: no adenopathy, no carotid bruit, no JVD, supple, symmetrical, trachea midline and thyroid not enlarged, symmetric, no tenderness/mass/nodules  Lungs: decreased BS RLL  Heart: regular rate and rhythm, S1, S2 normal, no murmur, click, rub or gallop  Abdomen: soft, non-tender; bowel sounds normal; no masses,  no organomegaly  Extremities: extremities normal, " atraumatic, no cyanosis or edema  Neurologic: Grossly normal      Assessment:     Nazanin Malone is a 37 y.o. female with:  Active Hospital Problems    Diagnosis    *Sickle-cell disease with pain    Bronchitis    Benign essential HTN    Opioid dependence       Plan:     # Sickle Cell pain crisis  # opioid dependence  - pt having complaints typical of her pain crisis.   - Will order MRI Of the right hip to rule out Avascular Necrosis of the Hip  - Dilaudid 2gm q 3hrs PRN and Oxycodone PRN     # Probable Gram negative Pneumonia / CAP  - On Rocephin and Azithromycin  - continue oxygen as needed. Will continue to monitor.    - added mucinex for cough suppresant  - acapella every 4 hrs PRN      # Benign Essential HTN  - continue home Norvasc  - would add ace if BP stays elevated after adequate pain control     # RLE swelling and pain  - BLE ultrasound negative for DVT    DVT/GI ppx  - Hep SQ  - regular diet     Miguel Tanner MD  [facility] Attending  Spectralink: [id number]  04/21/2017

## 2017-04-22 LAB
ALBUMIN SERPL BCP-MCNC: 3.5 G/DL
ALP SERPL-CCNC: 94 U/L
ALT SERPL W/O P-5'-P-CCNC: 30 U/L
ANION GAP SERPL CALC-SCNC: 7 MMOL/L
ANISOCYTOSIS BLD QL SMEAR: ABNORMAL
AST SERPL-CCNC: 26 U/L
BASOPHILS # BLD AUTO: ABNORMAL K/UL
BASOPHILS NFR BLD: 0 %
BILIRUB SERPL-MCNC: 0.5 MG/DL
BUN SERPL-MCNC: 6 MG/DL
CALCIUM SERPL-MCNC: 8.9 MG/DL
CHLORIDE SERPL-SCNC: 103 MMOL/L
CO2 SERPL-SCNC: 28 MMOL/L
CREAT SERPL-MCNC: 0.8 MG/DL
DIFFERENTIAL METHOD: ABNORMAL
EOSINOPHIL # BLD AUTO: ABNORMAL K/UL
EOSINOPHIL NFR BLD: 11 %
ERYTHROCYTE [DISTWIDTH] IN BLOOD BY AUTOMATED COUNT: 19.5 %
EST. GFR  (AFRICAN AMERICAN): >60 ML/MIN/1.73 M^2
EST. GFR  (NON AFRICAN AMERICAN): >60 ML/MIN/1.73 M^2
GLUCOSE SERPL-MCNC: 112 MG/DL
HCT VFR BLD AUTO: 29.9 %
HGB BLD-MCNC: 10.2 G/DL
HYPOCHROMIA BLD QL SMEAR: ABNORMAL
LYMPHOCYTES # BLD AUTO: ABNORMAL K/UL
LYMPHOCYTES NFR BLD: 27 %
MAGNESIUM SERPL-MCNC: 1.9 MG/DL
MCH RBC QN AUTO: 23.3 PG
MCHC RBC AUTO-ENTMCNC: 34.1 %
MCV RBC AUTO: 68 FL
MONOCYTES # BLD AUTO: ABNORMAL K/UL
MONOCYTES NFR BLD: 12 %
NEUTROPHILS NFR BLD: 49 %
NEUTS BAND NFR BLD MANUAL: 1 %
NRBC BLD-RTO: ABNORMAL /100 WBC
PHOSPHATE SERPL-MCNC: 2.7 MG/DL
PLATELET # BLD AUTO: 115 K/UL
PLATELET BLD QL SMEAR: ABNORMAL
PMV BLD AUTO: 9.2 FL
POLYCHROMASIA BLD QL SMEAR: ABNORMAL
POTASSIUM SERPL-SCNC: 3.8 MMOL/L
PROT SERPL-MCNC: 7.3 G/DL
RBC # BLD AUTO: 4.38 M/UL
RETICS/RBC NFR AUTO: 4.9 %
SODIUM SERPL-SCNC: 138 MMOL/L
WBC # BLD AUTO: 11.58 K/UL
WBC NRBC COR # BLD: 9.81 K/UL

## 2017-04-22 PROCEDURE — 11000001 HC ACUTE MED/SURG PRIVATE ROOM

## 2017-04-22 PROCEDURE — 83735 ASSAY OF MAGNESIUM: CPT

## 2017-04-22 PROCEDURE — 99233 SBSQ HOSP IP/OBS HIGH 50: CPT | Mod: ,,, | Performed by: HOSPITALIST

## 2017-04-22 PROCEDURE — 25000003 PHARM REV CODE 250: Performed by: HOSPITALIST

## 2017-04-22 PROCEDURE — 94761 N-INVAS EAR/PLS OXIMETRY MLT: CPT

## 2017-04-22 PROCEDURE — 25000242 PHARM REV CODE 250 ALT 637 W/ HCPCS: Performed by: NURSE PRACTITIONER

## 2017-04-22 PROCEDURE — 85027 COMPLETE CBC AUTOMATED: CPT

## 2017-04-22 PROCEDURE — 63600175 PHARM REV CODE 636 W HCPCS: Performed by: HOSPITALIST

## 2017-04-22 PROCEDURE — 25000003 PHARM REV CODE 250: Performed by: PHYSICIAN ASSISTANT

## 2017-04-22 PROCEDURE — 85007 BL SMEAR W/DIFF WBC COUNT: CPT

## 2017-04-22 PROCEDURE — 85045 AUTOMATED RETICULOCYTE COUNT: CPT

## 2017-04-22 PROCEDURE — 84100 ASSAY OF PHOSPHORUS: CPT

## 2017-04-22 PROCEDURE — 25000242 PHARM REV CODE 250 ALT 637 W/ HCPCS: Performed by: HOSPITALIST

## 2017-04-22 PROCEDURE — 80053 COMPREHEN METABOLIC PANEL: CPT

## 2017-04-22 PROCEDURE — 94640 AIRWAY INHALATION TREATMENT: CPT

## 2017-04-22 PROCEDURE — 27000221 HC OXYGEN, UP TO 24 HOURS

## 2017-04-22 PROCEDURE — 99900035 HC TECH TIME PER 15 MIN (STAT)

## 2017-04-22 PROCEDURE — 36415 COLL VENOUS BLD VENIPUNCTURE: CPT

## 2017-04-22 RX ORDER — IPRATROPIUM BROMIDE AND ALBUTEROL SULFATE 2.5; .5 MG/3ML; MG/3ML
3 SOLUTION RESPIRATORY (INHALATION) EVERY 4 HOURS
Status: DISCONTINUED | OUTPATIENT
Start: 2017-04-23 | End: 2017-05-05 | Stop reason: HOSPADM

## 2017-04-22 RX ORDER — HYDROMORPHONE HYDROCHLORIDE 1 MG/ML
3 INJECTION, SOLUTION INTRAMUSCULAR; INTRAVENOUS; SUBCUTANEOUS
Status: DISCONTINUED | OUTPATIENT
Start: 2017-04-22 | End: 2017-05-03

## 2017-04-22 RX ADMIN — Medication 3 ML: at 06:04

## 2017-04-22 RX ADMIN — IPRATROPIUM BROMIDE AND ALBUTEROL SULFATE 3 ML: .5; 3 SOLUTION RESPIRATORY (INHALATION) at 07:04

## 2017-04-22 RX ADMIN — IBUPROFEN 400 MG: 400 TABLET, FILM COATED ORAL at 02:04

## 2017-04-22 RX ADMIN — Medication 3 ML: at 02:04

## 2017-04-22 RX ADMIN — CARBAMAZEPINE 400 MG: 200 TABLET ORAL at 11:04

## 2017-04-22 RX ADMIN — HYDROMORPHONE HYDROCHLORIDE 2 MG: 2 INJECTION INTRAMUSCULAR; INTRAVENOUS; SUBCUTANEOUS at 03:04

## 2017-04-22 RX ADMIN — HYDROMORPHONE HYDROCHLORIDE 2 MG: 2 INJECTION INTRAMUSCULAR; INTRAVENOUS; SUBCUTANEOUS at 01:04

## 2017-04-22 RX ADMIN — FLUTICASONE PROPIONATE 2 SPRAY: 50 SPRAY, METERED NASAL at 09:04

## 2017-04-22 RX ADMIN — HEPARIN SODIUM 5000 UNITS: 5000 INJECTION, SOLUTION INTRAVENOUS; SUBCUTANEOUS at 02:04

## 2017-04-22 RX ADMIN — HYDROMORPHONE HYDROCHLORIDE 3 MG: 1 INJECTION, SOLUTION INTRAMUSCULAR; INTRAVENOUS; SUBCUTANEOUS at 09:04

## 2017-04-22 RX ADMIN — OXYCODONE HYDROCHLORIDE 10 MG: 5 TABLET ORAL at 01:04

## 2017-04-22 RX ADMIN — HYDROMORPHONE HYDROCHLORIDE 3 MG: 1 INJECTION, SOLUTION INTRAMUSCULAR; INTRAVENOUS; SUBCUTANEOUS at 12:04

## 2017-04-22 RX ADMIN — SODIUM CHLORIDE: 0.9 INJECTION, SOLUTION INTRAVENOUS at 06:04

## 2017-04-22 RX ADMIN — OXYCODONE HYDROCHLORIDE 10 MG: 5 TABLET ORAL at 09:04

## 2017-04-22 RX ADMIN — CEFTRIAXONE 1 G: 1 INJECTION, SOLUTION INTRAVENOUS at 12:04

## 2017-04-22 RX ADMIN — HEPARIN SODIUM 5000 UNITS: 5000 INJECTION, SOLUTION INTRAVENOUS; SUBCUTANEOUS at 09:04

## 2017-04-22 RX ADMIN — DIPHENHYDRAMINE HYDROCHLORIDE 12.5 MG: 50 INJECTION, SOLUTION INTRAMUSCULAR; INTRAVENOUS at 12:04

## 2017-04-22 RX ADMIN — GABAPENTIN 600 MG: 300 CAPSULE ORAL at 09:04

## 2017-04-22 RX ADMIN — BACLOFEN 10 MG: 10 TABLET ORAL at 09:04

## 2017-04-22 RX ADMIN — GUAIFENESIN AND DEXTROMETHORPHAN HYDROBROMIDE 1 TABLET: 600; 30 TABLET, EXTENDED RELEASE ORAL at 09:04

## 2017-04-22 RX ADMIN — BENZONATATE 100 MG: 100 CAPSULE ORAL at 01:04

## 2017-04-22 RX ADMIN — HYDROMORPHONE HYDROCHLORIDE 3 MG: 1 INJECTION, SOLUTION INTRAMUSCULAR; INTRAVENOUS; SUBCUTANEOUS at 06:04

## 2017-04-22 RX ADMIN — IPRATROPIUM BROMIDE AND ALBUTEROL SULFATE 3 ML: .5; 3 SOLUTION RESPIRATORY (INHALATION) at 10:04

## 2017-04-22 RX ADMIN — AZITHROMYCIN MONOHYDRATE 500 MG: 500 INJECTION, POWDER, LYOPHILIZED, FOR SOLUTION INTRAVENOUS at 01:04

## 2017-04-22 RX ADMIN — CETIRIZINE HYDROCHLORIDE 5 MG: 5 TABLET, FILM COATED ORAL at 09:04

## 2017-04-22 RX ADMIN — OXYCODONE HYDROCHLORIDE 10 MG: 5 TABLET ORAL at 02:04

## 2017-04-22 RX ADMIN — HEPARIN SODIUM 5000 UNITS: 5000 INJECTION, SOLUTION INTRAVENOUS; SUBCUTANEOUS at 06:04

## 2017-04-22 RX ADMIN — IPRATROPIUM BROMIDE AND ALBUTEROL SULFATE 3 ML: .5; 3 SOLUTION RESPIRATORY (INHALATION) at 06:04

## 2017-04-22 RX ADMIN — STANDARDIZED SENNA CONCENTRATE AND DOCUSATE SODIUM 1 TABLET: 8.6; 5 TABLET, FILM COATED ORAL at 09:04

## 2017-04-22 RX ADMIN — IPRATROPIUM BROMIDE AND ALBUTEROL SULFATE 3 ML: .5; 3 SOLUTION RESPIRATORY (INHALATION) at 01:04

## 2017-04-22 RX ADMIN — IBUPROFEN 400 MG: 400 TABLET, FILM COATED ORAL at 09:04

## 2017-04-22 RX ADMIN — IBUPROFEN 400 MG: 400 TABLET, FILM COATED ORAL at 06:04

## 2017-04-22 RX ADMIN — DIPHENHYDRAMINE HYDROCHLORIDE 12.5 MG: 50 INJECTION, SOLUTION INTRAMUSCULAR; INTRAVENOUS at 06:04

## 2017-04-22 RX ADMIN — AMLODIPINE BESYLATE 10 MG: 10 TABLET ORAL at 09:04

## 2017-04-22 RX ADMIN — HYDROMORPHONE HYDROCHLORIDE 2 MG: 2 INJECTION INTRAMUSCULAR; INTRAVENOUS; SUBCUTANEOUS at 06:04

## 2017-04-22 RX ADMIN — PROMETHAZINE HYDROCHLORIDE 12.5 MG: 12.5 TABLET ORAL at 03:04

## 2017-04-22 RX ADMIN — FLUOXETINE 40 MG: 20 CAPSULE ORAL at 09:04

## 2017-04-22 RX ADMIN — CARBAMAZEPINE 400 MG: 200 TABLET ORAL at 12:04

## 2017-04-22 RX ADMIN — OXYCODONE HYDROCHLORIDE 10 MG: 5 TABLET ORAL at 04:04

## 2017-04-22 NOTE — PROGRESS NOTES
"Progress Note   Hospital Medicine     Admit Date: 2017  Length of Stay:  LOS: 6 days   Principal Problem:Sickle-cell disease with pain     HPI and Hospital Course: Nazanin Malone is a 37 y.o. female who  has a PMH of Hypertension; Sickle cell-beta thalassemia disease with pain; and Trigeminal neuralgia. The patient presented to [facility] on 2017 due to worsening right leg pain over the last week as well as a cough productive of green mucous. She states she came to ED 2 days ago for her cough and was prescribed Augmentin. She says the mucous is no longer green, but she still feels all "stuffed up and congested." She has had to be admitted a few other times due to sickle pain crisis, but rarely needs transfusions.     Interval History: Patient reports slight improvement after increasing pain medications.     Respiratory: positive for cough and dyspnea on exertion  Cardiovascular: negative  Gastrointestinal: negative  Genitourinary:negative  Neurological: negative    Objective:     Last 24 Hour Vital Signs:  BP  Min: 132/88  Max: 173/100  Temp  Av.7 °F (37.1 °C)  Min: 98.2 °F (36.8 °C)  Max: 99.1 °F (37.3 °C)  Pulse  Av.7  Min: 85  Max: 110  Resp  Av.7  Min: 16  Max: 22  SpO2  Av %  Min: 94 %  Max: 100 %  Body mass index is 54.87 kg/(m^2).  I/O last 3 completed shifts:  In:  [P.O.:]  Out: -     Physical Examination:  General appearance: alert, appears stated age and cooperative  Head: Normocephalic, without obvious abnormality, atraumatic  Neck: no adenopathy, no carotid bruit, no JVD, supple, symmetrical, trachea midline and thyroid not enlarged, symmetric, no tenderness/mass/nodules  Lungs: decreased BS RLL  Heart: regular rate and rhythm, S1, S2 normal, no murmur, click, rub or gallop  Abdomen: soft, non-tender; bowel sounds normal; no masses,  no organomegaly  Extremities: extremities normal, atraumatic, no cyanosis or edema  Neurologic: Grossly normal  "     Assessment:     Nazanin Malone is a 37 y.o. female with:  Active Hospital Problems    Diagnosis    *Sickle-cell disease with pain    Bronchitis    Benign essential HTN    Opioid dependence       Plan:     # Sickle Cell pain crisis  # opioid dependence  - pt having complaints typical of her pain crisis.  - MRI of the right HIP did not reveal any evidence of avascular necrosis  - Dilaudid 3 gm q 3 hrs  - Seen by Hematology who recommended increasing pain medications      # Probable Gram negative Pneumonia / CAP  - On Rocephin and Azithromycin  - continue oxygen as needed. Will continue to monitor.    - added mucinex for cough suppresant  - acapella every 4 hrs PRN      # Benign Essential HTN  - continue home Norvasc  - would add ace if BP stays elevated after adequate pain control     # RLE swelling and pain  - BLE ultrasound negative for DVT    DVT/GI ppx  - Hep SQ  - regular diet     Miguel Tanner MD  [facility] Medicine Attending  Spectralink: [id number]  04/22/2017

## 2017-04-23 PROBLEM — A41.50 SEPSIS DUE TO GRAM NEGATIVE BACTERIA: Status: ACTIVE | Noted: 2017-04-23

## 2017-04-23 LAB
ALBUMIN SERPL BCP-MCNC: 3.3 G/DL
ALP SERPL-CCNC: 95 U/L
ALT SERPL W/O P-5'-P-CCNC: 26 U/L
ANION GAP SERPL CALC-SCNC: 10 MMOL/L
AST SERPL-CCNC: 26 U/L
BASOPHILS # BLD AUTO: 0.03 K/UL
BASOPHILS NFR BLD: 0.2 %
BILIRUB SERPL-MCNC: 0.5 MG/DL
BUN SERPL-MCNC: 7 MG/DL
CALCIUM SERPL-MCNC: 9 MG/DL
CHLORIDE SERPL-SCNC: 104 MMOL/L
CO2 SERPL-SCNC: 27 MMOL/L
CREAT SERPL-MCNC: 0.8 MG/DL
DIFFERENTIAL METHOD: ABNORMAL
EOSINOPHIL # BLD AUTO: 1 K/UL
EOSINOPHIL NFR BLD: 7.1 %
ERYTHROCYTE [DISTWIDTH] IN BLOOD BY AUTOMATED COUNT: 19.9 %
EST. GFR  (AFRICAN AMERICAN): >60 ML/MIN/1.73 M^2
EST. GFR  (NON AFRICAN AMERICAN): >60 ML/MIN/1.73 M^2
GLUCOSE SERPL-MCNC: 89 MG/DL
HCT VFR BLD AUTO: 29 %
HGB BLD-MCNC: 9.8 G/DL
LYMPHOCYTES # BLD AUTO: 3.1 K/UL
LYMPHOCYTES NFR BLD: 22.9 %
MAGNESIUM SERPL-MCNC: 1.9 MG/DL
MCH RBC QN AUTO: 23.3 PG
MCHC RBC AUTO-ENTMCNC: 33.8 %
MCV RBC AUTO: 69 FL
MONOCYTES # BLD AUTO: 1.5 K/UL
MONOCYTES NFR BLD: 11.2 %
NEUTROPHILS # BLD AUTO: 7.9 K/UL
NEUTROPHILS NFR BLD: 58.6 %
PHOSPHATE SERPL-MCNC: 3.4 MG/DL
PLATELET # BLD AUTO: 109 K/UL
PLATELET BLD QL SMEAR: ABNORMAL
PMV BLD AUTO: 8.7 FL
POTASSIUM SERPL-SCNC: 4 MMOL/L
PROT SERPL-MCNC: 7.3 G/DL
RBC # BLD AUTO: 4.21 M/UL
SODIUM SERPL-SCNC: 141 MMOL/L
VANCOMYCIN SERPL-MCNC: <1.1 UG/ML
WBC # BLD AUTO: 13.52 K/UL

## 2017-04-23 PROCEDURE — 11000001 HC ACUTE MED/SURG PRIVATE ROOM

## 2017-04-23 PROCEDURE — 80202 ASSAY OF VANCOMYCIN: CPT

## 2017-04-23 PROCEDURE — 36415 COLL VENOUS BLD VENIPUNCTURE: CPT

## 2017-04-23 PROCEDURE — 85025 COMPLETE CBC W/AUTO DIFF WBC: CPT

## 2017-04-23 PROCEDURE — 25000003 PHARM REV CODE 250: Performed by: HOSPITALIST

## 2017-04-23 PROCEDURE — 80053 COMPREHEN METABOLIC PANEL: CPT

## 2017-04-23 PROCEDURE — 81001 URINALYSIS AUTO W/SCOPE: CPT

## 2017-04-23 PROCEDURE — 94640 AIRWAY INHALATION TREATMENT: CPT

## 2017-04-23 PROCEDURE — 25000003 PHARM REV CODE 250: Performed by: PHYSICIAN ASSISTANT

## 2017-04-23 PROCEDURE — 84100 ASSAY OF PHOSPHORUS: CPT

## 2017-04-23 PROCEDURE — 63600175 PHARM REV CODE 636 W HCPCS: Performed by: HOSPITALIST

## 2017-04-23 PROCEDURE — 99233 SBSQ HOSP IP/OBS HIGH 50: CPT | Mod: ,,, | Performed by: HOSPITALIST

## 2017-04-23 PROCEDURE — 25000242 PHARM REV CODE 250 ALT 637 W/ HCPCS: Performed by: NURSE PRACTITIONER

## 2017-04-23 PROCEDURE — 94761 N-INVAS EAR/PLS OXIMETRY MLT: CPT

## 2017-04-23 PROCEDURE — 83735 ASSAY OF MAGNESIUM: CPT

## 2017-04-23 PROCEDURE — 87086 URINE CULTURE/COLONY COUNT: CPT

## 2017-04-23 RX ORDER — FLUTICASONE FUROATE AND VILANTEROL 100; 25 UG/1; UG/1
1 POWDER RESPIRATORY (INHALATION) DAILY
Status: DISCONTINUED | OUTPATIENT
Start: 2017-04-24 | End: 2017-05-05 | Stop reason: HOSPADM

## 2017-04-23 RX ORDER — LACTULOSE 10 G/15ML
20 SOLUTION ORAL EVERY 6 HOURS PRN
Status: DISCONTINUED | OUTPATIENT
Start: 2017-04-23 | End: 2017-05-05 | Stop reason: HOSPADM

## 2017-04-23 RX ADMIN — DIPHENHYDRAMINE HYDROCHLORIDE 12.5 MG: 50 INJECTION, SOLUTION INTRAMUSCULAR; INTRAVENOUS at 07:04

## 2017-04-23 RX ADMIN — BENZONATATE 100 MG: 100 CAPSULE ORAL at 08:04

## 2017-04-23 RX ADMIN — PREDNISONE 50 MG: 10 TABLET ORAL at 01:04

## 2017-04-23 RX ADMIN — AMLODIPINE BESYLATE 10 MG: 10 TABLET ORAL at 08:04

## 2017-04-23 RX ADMIN — HYDROMORPHONE HYDROCHLORIDE 3 MG: 1 INJECTION, SOLUTION INTRAMUSCULAR; INTRAVENOUS; SUBCUTANEOUS at 10:04

## 2017-04-23 RX ADMIN — IPRATROPIUM BROMIDE AND ALBUTEROL SULFATE 3 ML: .5; 3 SOLUTION RESPIRATORY (INHALATION) at 08:04

## 2017-04-23 RX ADMIN — HYDROMORPHONE HYDROCHLORIDE 3 MG: 1 INJECTION, SOLUTION INTRAMUSCULAR; INTRAVENOUS; SUBCUTANEOUS at 07:04

## 2017-04-23 RX ADMIN — GUAIFENESIN AND DEXTROMETHORPHAN HYDROBROMIDE 1 TABLET: 600; 30 TABLET, EXTENDED RELEASE ORAL at 09:04

## 2017-04-23 RX ADMIN — IBUPROFEN 400 MG: 400 TABLET, FILM COATED ORAL at 04:04

## 2017-04-23 RX ADMIN — IPRATROPIUM BROMIDE AND ALBUTEROL SULFATE 3 ML: .5; 3 SOLUTION RESPIRATORY (INHALATION) at 05:04

## 2017-04-23 RX ADMIN — GUAIFENESIN AND DEXTROMETHORPHAN HYDROBROMIDE 1 TABLET: 600; 30 TABLET, EXTENDED RELEASE ORAL at 08:04

## 2017-04-23 RX ADMIN — IPRATROPIUM BROMIDE AND ALBUTEROL SULFATE 3 ML: .5; 3 SOLUTION RESPIRATORY (INHALATION) at 11:04

## 2017-04-23 RX ADMIN — IPRATROPIUM BROMIDE AND ALBUTEROL SULFATE 3 ML: .5; 3 SOLUTION RESPIRATORY (INHALATION) at 07:04

## 2017-04-23 RX ADMIN — Medication 3 ML: at 04:04

## 2017-04-23 RX ADMIN — LACTULOSE 20 G: 20 SOLUTION ORAL at 09:04

## 2017-04-23 RX ADMIN — DIPHENHYDRAMINE HYDROCHLORIDE 12.5 MG: 50 INJECTION, SOLUTION INTRAMUSCULAR; INTRAVENOUS at 12:04

## 2017-04-23 RX ADMIN — OXYCODONE HYDROCHLORIDE 10 MG: 5 TABLET ORAL at 08:04

## 2017-04-23 RX ADMIN — VANCOMYCIN HYDROCHLORIDE 1000 MG: 1 INJECTION, POWDER, LYOPHILIZED, FOR SOLUTION INTRAVENOUS at 05:04

## 2017-04-23 RX ADMIN — BACLOFEN 10 MG: 10 TABLET ORAL at 08:04

## 2017-04-23 RX ADMIN — CARBAMAZEPINE 400 MG: 200 TABLET ORAL at 09:04

## 2017-04-23 RX ADMIN — FLUTICASONE PROPIONATE 2 SPRAY: 50 SPRAY, METERED NASAL at 08:04

## 2017-04-23 RX ADMIN — CARBAMAZEPINE 400 MG: 200 TABLET ORAL at 08:04

## 2017-04-23 RX ADMIN — IBUPROFEN 400 MG: 400 TABLET, FILM COATED ORAL at 09:04

## 2017-04-23 RX ADMIN — STANDARDIZED SENNA CONCENTRATE AND DOCUSATE SODIUM 1 TABLET: 8.6; 5 TABLET, FILM COATED ORAL at 08:04

## 2017-04-23 RX ADMIN — LACTULOSE 20 G: 20 SOLUTION ORAL at 01:04

## 2017-04-23 RX ADMIN — IPRATROPIUM BROMIDE AND ALBUTEROL SULFATE 3 ML: .5; 3 SOLUTION RESPIRATORY (INHALATION) at 03:04

## 2017-04-23 RX ADMIN — HYDROMORPHONE HYDROCHLORIDE 3 MG: 1 INJECTION, SOLUTION INTRAMUSCULAR; INTRAVENOUS; SUBCUTANEOUS at 04:04

## 2017-04-23 RX ADMIN — IBUPROFEN 400 MG: 400 TABLET, FILM COATED ORAL at 03:04

## 2017-04-23 RX ADMIN — BACLOFEN 10 MG: 10 TABLET ORAL at 09:04

## 2017-04-23 RX ADMIN — AZITHROMYCIN MONOHYDRATE 500 MG: 500 INJECTION, POWDER, LYOPHILIZED, FOR SOLUTION INTRAVENOUS at 03:04

## 2017-04-23 RX ADMIN — SODIUM CHLORIDE: 0.9 INJECTION, SOLUTION INTRAVENOUS at 12:04

## 2017-04-23 RX ADMIN — GABAPENTIN 600 MG: 300 CAPSULE ORAL at 08:04

## 2017-04-23 RX ADMIN — CEFTRIAXONE 1 G: 1 INJECTION, SOLUTION INTRAVENOUS at 12:04

## 2017-04-23 RX ADMIN — HYDROMORPHONE HYDROCHLORIDE 3 MG: 1 INJECTION, SOLUTION INTRAMUSCULAR; INTRAVENOUS; SUBCUTANEOUS at 12:04

## 2017-04-23 RX ADMIN — HEPARIN SODIUM 5000 UNITS: 5000 INJECTION, SOLUTION INTRAVENOUS; SUBCUTANEOUS at 04:04

## 2017-04-23 RX ADMIN — DIPHENHYDRAMINE HYDROCHLORIDE 12.5 MG: 50 INJECTION, SOLUTION INTRAMUSCULAR; INTRAVENOUS at 01:04

## 2017-04-23 RX ADMIN — PROMETHAZINE HYDROCHLORIDE 12.5 MG: 12.5 TABLET ORAL at 07:04

## 2017-04-23 RX ADMIN — STANDARDIZED SENNA CONCENTRATE AND DOCUSATE SODIUM 1 TABLET: 8.6; 5 TABLET, FILM COATED ORAL at 09:04

## 2017-04-23 RX ADMIN — OXYCODONE HYDROCHLORIDE 10 MG: 5 TABLET ORAL at 03:04

## 2017-04-23 RX ADMIN — CETIRIZINE HYDROCHLORIDE 5 MG: 5 TABLET, FILM COATED ORAL at 08:04

## 2017-04-23 RX ADMIN — GABAPENTIN 600 MG: 300 CAPSULE ORAL at 09:04

## 2017-04-23 RX ADMIN — PROMETHAZINE HYDROCHLORIDE 12.5 MG: 12.5 TABLET ORAL at 01:04

## 2017-04-23 RX ADMIN — HEPARIN SODIUM 5000 UNITS: 5000 INJECTION, SOLUTION INTRAVENOUS; SUBCUTANEOUS at 09:04

## 2017-04-23 RX ADMIN — FLUOXETINE 40 MG: 20 CAPSULE ORAL at 08:04

## 2017-04-23 NOTE — PROGRESS NOTES
Pt c/o chest tightness and SOB. Pt requesting a steroid. PRN resp treatment given at this time. RITU paged and the NP notified of pt complaint. Will review chart and place order. Will cont. To monitor pt.

## 2017-04-23 NOTE — PROGRESS NOTES
"Progress Note   Hospital Medicine     Admit Date: 2017  Length of Stay:  LOS: 7 days   Principal Problem:Sickle-cell disease with pain     HPI and Hospital Course: Nazanin Malone is a 37 y.o. female who  has a PMH of Hypertension; Sickle cell-beta thalassemia disease with pain; and Trigeminal neuralgia. The patient presented to [facility] on 2017 due to worsening right leg pain over the last week as well as a cough productive of green mucous. She states she came to ED 2 days ago for her cough and was prescribed Augmentin. She says the mucous is no longer green, but she still feels all "stuffed up and congested." She has had to be admitted a few other times due to sickle pain crisis, but rarely needs transfusions. Persistently asking for more pain medications,     Interval History: Patient spiked a fever of 101 yesterday evening, broaden coverage to Vancomycin, Consulted with ID. Lost IV access, replaced by Anesthesia.     Respiratory: positive for cough and dyspnea on exertion  Cardiovascular: negative  Gastrointestinal: negative  Genitourinary:negative  Neurological: negative    Objective:     Last 24 Hour Vital Signs:  BP  Min: 136/74  Max: 174/98  Temp  Av.5 °F (36.9 °C)  Min: 97.8 °F (36.6 °C)  Max: 98.9 °F (37.2 °C)  Pulse  Av.4  Min: 92  Max: 118  Resp  Av.5  Min: 14  Max: 22  SpO2  Av %  Min: 92 %  Max: 98 %  Body mass index is 54.87 kg/(m^2).  I/O last 3 completed shifts:  In: 500 [P.O.:500]  Out: -     Physical Examination:  General appearance: alert, appears stated age and cooperative  Head: Normocephalic, without obvious abnormality, atraumatic  Neck: no adenopathy, no carotid bruit, no JVD, supple, symmetrical, trachea midline and thyroid not enlarged, symmetric, no tenderness/mass/nodules  Lungs: decreased BS RLL  Heart: regular rate and rhythm, S1, S2 normal, no murmur, click, rub or gallop  Abdomen: soft, non-tender; bowel sounds normal; no masses,  no " organomegaly  Extremities: extremities normal, atraumatic, no cyanosis or edema  Neurologic: Grossly normal      Assessment:     Nazanin Malone is a 37 y.o. female with:  Active Hospital Problems    Diagnosis    *Sickle-cell disease with pain    Bronchitis    Benign essential HTN    Opioid dependence       Plan:     # Sickle Cell pain crisis  # opioid dependence  - pt having complaints typical of her pain crisis.  - MRI of the right HIP did not reveal any evidence of avascular necrosis  - Dilaudid 3 gm q 3 hrs  - Seen by Hematology who recommended increasing pain medications      # Probable Gram negative Pneumonia / CAP  - On Rocephin and Azithromycin  - continue oxygen as needed. Will continue to monitor.    - added mucinex for cough suppresant  - acapella every 4 hrs PRN     #Sepsis due to gram negative organism  - Urine and urine cultures  - blood cs  - Added Vancomycin IVPB  - ID consult     #Bronchitis   Duonebs PRN  Start Prednisone 50mg daily  Advair    # Benign Essential HTN  - continue home Norvasc  - would add ace if BP stays elevated after adequate pain control     # RLE swelling and pain  - BLE ultrasound negative for DVT  MRI of the Hip revealed periostitis, negative for Avascular necrosis    DVT/GI ppx  - Hep SQ  - regular diet     Miguel Tanner MD  [facility] Medicine Attending  Spectralink: [id number]  04/23/2017

## 2017-04-23 NOTE — PLAN OF CARE
Problem: Patient Care Overview  Goal: Plan of Care Review  Outcome: Ongoing (interventions implemented as appropriate)  Pt remained free from falls/injuries. VSS. Administered PRN pain medication as ordered. Skin intact. Pt is in bed with side rails up x 3, wheels locked, bed in lowest position, call light in reach.

## 2017-04-24 LAB
ALBUMIN SERPL BCP-MCNC: 3.1 G/DL
ALP SERPL-CCNC: 99 U/L
ALT SERPL W/O P-5'-P-CCNC: 24 U/L
ANION GAP SERPL CALC-SCNC: 5 MMOL/L
ANISOCYTOSIS BLD QL SMEAR: SLIGHT
AST SERPL-CCNC: 26 U/L
BACTERIA #/AREA URNS AUTO: ABNORMAL /HPF
BASOPHILS # BLD AUTO: ABNORMAL K/UL
BASOPHILS NFR BLD: 0 %
BILIRUB SERPL-MCNC: 0.4 MG/DL
BILIRUB UR QL STRIP: NEGATIVE
BUN SERPL-MCNC: 9 MG/DL
CALCIUM SERPL-MCNC: 9.2 MG/DL
CHLORIDE SERPL-SCNC: 103 MMOL/L
CLARITY UR REFRACT.AUTO: ABNORMAL
CO2 SERPL-SCNC: 32 MMOL/L
COLOR UR AUTO: YELLOW
CREAT SERPL-MCNC: 0.7 MG/DL
DIFFERENTIAL METHOD: ABNORMAL
EOSINOPHIL # BLD AUTO: ABNORMAL K/UL
EOSINOPHIL NFR BLD: 10 %
ERYTHROCYTE [DISTWIDTH] IN BLOOD BY AUTOMATED COUNT: 20.1 %
EST. GFR  (AFRICAN AMERICAN): >60 ML/MIN/1.73 M^2
EST. GFR  (NON AFRICAN AMERICAN): >60 ML/MIN/1.73 M^2
GLUCOSE SERPL-MCNC: 116 MG/DL
GLUCOSE UR QL STRIP: ABNORMAL
HCT VFR BLD AUTO: 29.7 %
HGB BLD-MCNC: 9.4 G/DL
HGB UR QL STRIP: ABNORMAL
HYPOCHROMIA BLD QL SMEAR: ABNORMAL
KETONES UR QL STRIP: NEGATIVE
LEUKOCYTE ESTERASE UR QL STRIP: ABNORMAL
LYMPHOCYTES # BLD AUTO: ABNORMAL K/UL
LYMPHOCYTES NFR BLD: 11 %
MAGNESIUM SERPL-MCNC: 1.9 MG/DL
MCH RBC QN AUTO: 22.7 PG
MCHC RBC AUTO-ENTMCNC: 31.6 %
MCV RBC AUTO: 72 FL
MICROSCOPIC COMMENT: ABNORMAL
MONOCYTES # BLD AUTO: ABNORMAL K/UL
MONOCYTES NFR BLD: 3 %
MYELOCYTES NFR BLD MANUAL: 1 %
NEUTROPHILS NFR BLD: 75 %
NITRITE UR QL STRIP: NEGATIVE
NRBC BLD-RTO: ABNORMAL /100 WBC
OVALOCYTES BLD QL SMEAR: ABNORMAL
PH UR STRIP: 7 [PH] (ref 5–8)
PHOSPHATE SERPL-MCNC: 2.9 MG/DL
PLATELET # BLD AUTO: 111 K/UL
PLATELET BLD QL SMEAR: ABNORMAL
PMV BLD AUTO: 8.7 FL
POIKILOCYTOSIS BLD QL SMEAR: SLIGHT
POLYCHROMASIA BLD QL SMEAR: ABNORMAL
POTASSIUM SERPL-SCNC: 4.4 MMOL/L
PROT SERPL-MCNC: 7.3 G/DL
PROT UR QL STRIP: NEGATIVE
RBC # BLD AUTO: 4.14 M/UL
RBC #/AREA URNS AUTO: >100 /HPF (ref 0–4)
SODIUM SERPL-SCNC: 140 MMOL/L
SP GR UR STRIP: 1 (ref 1–1.03)
SQUAMOUS #/AREA URNS AUTO: 1 /HPF
TARGETS BLD QL SMEAR: ABNORMAL
URN SPEC COLLECT METH UR: ABNORMAL
UROBILINOGEN UR STRIP-ACNC: NEGATIVE EU/DL
WBC # BLD AUTO: 15.26 K/UL
WBC #/AREA URNS AUTO: 9 /HPF (ref 0–5)
WBC NRBC COR # BLD: 12.93 K/UL

## 2017-04-24 PROCEDURE — 80053 COMPREHEN METABOLIC PANEL: CPT

## 2017-04-24 PROCEDURE — 94761 N-INVAS EAR/PLS OXIMETRY MLT: CPT

## 2017-04-24 PROCEDURE — 25000003 PHARM REV CODE 250: Performed by: HOSPITALIST

## 2017-04-24 PROCEDURE — 25000003 PHARM REV CODE 250: Performed by: INTERNAL MEDICINE

## 2017-04-24 PROCEDURE — 84100 ASSAY OF PHOSPHORUS: CPT

## 2017-04-24 PROCEDURE — 94640 AIRWAY INHALATION TREATMENT: CPT

## 2017-04-24 PROCEDURE — 25000242 PHARM REV CODE 250 ALT 637 W/ HCPCS: Performed by: HOSPITALIST

## 2017-04-24 PROCEDURE — 25000242 PHARM REV CODE 250 ALT 637 W/ HCPCS: Performed by: NURSE PRACTITIONER

## 2017-04-24 PROCEDURE — 99222 1ST HOSP IP/OBS MODERATE 55: CPT | Mod: ,,, | Performed by: INTERNAL MEDICINE

## 2017-04-24 PROCEDURE — 27000221 HC OXYGEN, UP TO 24 HOURS

## 2017-04-24 PROCEDURE — 36415 COLL VENOUS BLD VENIPUNCTURE: CPT

## 2017-04-24 PROCEDURE — 83735 ASSAY OF MAGNESIUM: CPT

## 2017-04-24 PROCEDURE — 63600175 PHARM REV CODE 636 W HCPCS: Performed by: HOSPITALIST

## 2017-04-24 PROCEDURE — 99233 SBSQ HOSP IP/OBS HIGH 50: CPT | Mod: ,,, | Performed by: HOSPITALIST

## 2017-04-24 PROCEDURE — 85007 BL SMEAR W/DIFF WBC COUNT: CPT

## 2017-04-24 PROCEDURE — 25000003 PHARM REV CODE 250: Performed by: PHYSICIAN ASSISTANT

## 2017-04-24 PROCEDURE — 85027 COMPLETE CBC AUTOMATED: CPT

## 2017-04-24 PROCEDURE — 11000001 HC ACUTE MED/SURG PRIVATE ROOM

## 2017-04-24 RX ORDER — MOXIFLOXACIN HYDROCHLORIDE 400 MG/1
400 TABLET ORAL DAILY
Status: DISCONTINUED | OUTPATIENT
Start: 2017-04-24 | End: 2017-04-28

## 2017-04-24 RX ADMIN — HEPARIN SODIUM 5000 UNITS: 5000 INJECTION, SOLUTION INTRAVENOUS; SUBCUTANEOUS at 01:04

## 2017-04-24 RX ADMIN — HYDROMORPHONE HYDROCHLORIDE 3 MG: 1 INJECTION, SOLUTION INTRAMUSCULAR; INTRAVENOUS; SUBCUTANEOUS at 05:04

## 2017-04-24 RX ADMIN — Medication 3 ML: at 05:04

## 2017-04-24 RX ADMIN — LACTULOSE 20 G: 20 SOLUTION ORAL at 05:04

## 2017-04-24 RX ADMIN — GABAPENTIN 600 MG: 300 CAPSULE ORAL at 08:04

## 2017-04-24 RX ADMIN — FLUOXETINE 40 MG: 20 CAPSULE ORAL at 08:04

## 2017-04-24 RX ADMIN — HEPARIN SODIUM 5000 UNITS: 5000 INJECTION, SOLUTION INTRAVENOUS; SUBCUTANEOUS at 09:04

## 2017-04-24 RX ADMIN — CARBAMAZEPINE 400 MG: 200 TABLET ORAL at 08:04

## 2017-04-24 RX ADMIN — PREDNISONE 50 MG: 10 TABLET ORAL at 08:04

## 2017-04-24 RX ADMIN — STANDARDIZED SENNA CONCENTRATE AND DOCUSATE SODIUM 1 TABLET: 8.6; 5 TABLET, FILM COATED ORAL at 08:04

## 2017-04-24 RX ADMIN — IBUPROFEN 400 MG: 400 TABLET, FILM COATED ORAL at 05:04

## 2017-04-24 RX ADMIN — MOXIFLOXACIN HYDROCHLORIDE 400 MG: 400 TABLET, FILM COATED ORAL at 02:04

## 2017-04-24 RX ADMIN — FLUTICASONE PROPIONATE 2 SPRAY: 50 SPRAY, METERED NASAL at 08:04

## 2017-04-24 RX ADMIN — GUAIFENESIN AND DEXTROMETHORPHAN HYDROBROMIDE 1 TABLET: 600; 30 TABLET, EXTENDED RELEASE ORAL at 08:04

## 2017-04-24 RX ADMIN — IPRATROPIUM BROMIDE AND ALBUTEROL SULFATE 3 ML: .5; 3 SOLUTION RESPIRATORY (INHALATION) at 03:04

## 2017-04-24 RX ADMIN — BACLOFEN 10 MG: 10 TABLET ORAL at 08:04

## 2017-04-24 RX ADMIN — AMLODIPINE BESYLATE 10 MG: 10 TABLET ORAL at 08:04

## 2017-04-24 RX ADMIN — CETIRIZINE HYDROCHLORIDE 5 MG: 5 TABLET, FILM COATED ORAL at 08:04

## 2017-04-24 RX ADMIN — HEPARIN SODIUM 5000 UNITS: 5000 INJECTION, SOLUTION INTRAVENOUS; SUBCUTANEOUS at 05:04

## 2017-04-24 RX ADMIN — HYDROMORPHONE HYDROCHLORIDE 3 MG: 1 INJECTION, SOLUTION INTRAMUSCULAR; INTRAVENOUS; SUBCUTANEOUS at 01:04

## 2017-04-24 RX ADMIN — FLUTICASONE FUROATE AND VILANTEROL TRIFENATATE 1 PUFF: 100; 25 POWDER RESPIRATORY (INHALATION) at 08:04

## 2017-04-24 RX ADMIN — SODIUM CHLORIDE: 0.9 INJECTION, SOLUTION INTRAVENOUS at 05:04

## 2017-04-24 RX ADMIN — IBUPROFEN 400 MG: 400 TABLET, FILM COATED ORAL at 09:04

## 2017-04-24 RX ADMIN — SODIUM CHLORIDE: 0.9 INJECTION, SOLUTION INTRAVENOUS at 08:04

## 2017-04-24 RX ADMIN — Medication 3 ML: at 01:04

## 2017-04-24 RX ADMIN — PROMETHAZINE HYDROCHLORIDE 12.5 MG: 12.5 TABLET ORAL at 08:04

## 2017-04-24 RX ADMIN — IBUPROFEN 400 MG: 400 TABLET, FILM COATED ORAL at 01:04

## 2017-04-24 RX ADMIN — STANDARDIZED SENNA CONCENTRATE AND DOCUSATE SODIUM 1 TABLET: 8.6; 5 TABLET, FILM COATED ORAL at 09:04

## 2017-04-24 RX ADMIN — DIPHENHYDRAMINE HYDROCHLORIDE 12.5 MG: 50 INJECTION, SOLUTION INTRAMUSCULAR; INTRAVENOUS at 09:04

## 2017-04-24 RX ADMIN — PROMETHAZINE HYDROCHLORIDE 12.5 MG: 12.5 TABLET ORAL at 01:04

## 2017-04-24 RX ADMIN — DIPHENHYDRAMINE HYDROCHLORIDE 12.5 MG: 50 INJECTION, SOLUTION INTRAMUSCULAR; INTRAVENOUS at 04:04

## 2017-04-24 RX ADMIN — HYDROMORPHONE HYDROCHLORIDE 3 MG: 1 INJECTION, SOLUTION INTRAMUSCULAR; INTRAVENOUS; SUBCUTANEOUS at 08:04

## 2017-04-24 RX ADMIN — VANCOMYCIN HYDROCHLORIDE 1000 MG: 1 INJECTION, POWDER, LYOPHILIZED, FOR SOLUTION INTRAVENOUS at 05:04

## 2017-04-24 RX ADMIN — IPRATROPIUM BROMIDE AND ALBUTEROL SULFATE 3 ML: .5; 3 SOLUTION RESPIRATORY (INHALATION) at 07:04

## 2017-04-24 RX ADMIN — HYDROMORPHONE HYDROCHLORIDE 3 MG: 1 INJECTION, SOLUTION INTRAMUSCULAR; INTRAVENOUS; SUBCUTANEOUS at 04:04

## 2017-04-24 RX ADMIN — HYDROMORPHONE HYDROCHLORIDE 3 MG: 1 INJECTION, SOLUTION INTRAMUSCULAR; INTRAVENOUS; SUBCUTANEOUS at 09:04

## 2017-04-24 RX ADMIN — IPRATROPIUM BROMIDE AND ALBUTEROL SULFATE 3 ML: .5; 3 SOLUTION RESPIRATORY (INHALATION) at 11:04

## 2017-04-24 RX ADMIN — DIPHENHYDRAMINE HYDROCHLORIDE 12.5 MG: 50 INJECTION, SOLUTION INTRAMUSCULAR; INTRAVENOUS at 01:04

## 2017-04-24 RX ADMIN — Medication 3 ML: at 09:04

## 2017-04-24 NOTE — SUBJECTIVE & OBJECTIVE
Past Medical History:   Diagnosis Date    Hypertension     Sickle cell-beta thalassemia disease with pain     Trigeminal neuralgia        Past Surgical History:   Procedure Laterality Date     SECTION      TUBAL LIGATION         Review of patient's allergies indicates:   Allergen Reactions    Morphine Itching       Medications:  Prescriptions Prior to Admission   Medication Sig    [] amoxicillin-clavulanate 875-125mg (AUGMENTIN) 875-125 mg per tablet Take 1 tablet by mouth 2 (two) times daily.    baclofen (LIORESAL) 10 MG tablet Take 10 mg by mouth 2 (two) times daily.    benzonatate (TESSALON) 100 MG capsule Take 1 capsule (100 mg total) by mouth 3 (three) times daily as needed for Cough.    carbamazepine (TEGRETOL) 200 mg tablet Take 2 tablets (400 mg total) by mouth 2 (two) times daily.    fluoxetine (PROZAC) 40 MG capsule Take 40 mg by mouth once daily.    gabapentin (NEURONTIN) 300 MG capsule Take 600 mg by mouth 2 (two) times daily.    hydrOXYzine pamoate (VISTARIL) 25 MG Cap Take 25 mg by mouth 3 (three) times daily.    hydrOXYzine pamoate (VISTARIL) 50 MG Cap Take 50 mg by mouth once daily.    ondansetron (ZOFRAN-ODT) 8 MG TbDL Take 1 tablet (8 mg total) by mouth every 6 (six) hours as needed.    oxycodone-acetaminophen (PERCOCET)  mg per tablet Take 1 tablet by mouth every 6 (six) hours as needed for Pain.    [] predniSONE (DELTASONE) 20 MG tablet Take 3 tablets (60 mg total) by mouth once daily.     Antibiotics     Start     Stop Route Frequency Ordered    17 1215  cefTRIAXone (ROCEPHIN) 1 g in dextrose 5 % 50 mL IVPB      -- IV Every 24 hours (non-standard times) 17 1056    17 1300  azithromycin (ZITHROMAX) 500 mg in dextrose 5 % 250 mL IVPB      -- IV Every 24 hours (non-standard times) 17 1119    17 1715  vancomycin 1 g in dextrose 5 % 250 mL IVPB (ready to mix system)  (Vancomycin IVPB with levels panel)      -- IV Every 12  hours (non-standard times) 04/23/17 1614        Antifungals     None        Antivirals     None           There is no immunization history for the selected administration types on file for this patient.    Family History     None        Social History     Social History    Marital status: Single     Spouse name: N/A    Number of children: N/A    Years of education: N/A     Social History Main Topics    Smoking status: Never Smoker    Smokeless tobacco: None    Alcohol use None    Drug use: None    Sexual activity: Not Asked     Other Topics Concern    None     Social History Narrative     Review of Systems   Constitutional: Positive for activity change and fever. Negative for appetite change.   HENT: Positive for congestion and sinus pressure. Negative for ear pain, facial swelling and hearing loss.    Eyes: Negative.    Respiratory: Positive for cough and shortness of breath. Negative for apnea, choking and chest tightness.    Cardiovascular: Negative.    Gastrointestinal: Negative.    Musculoskeletal:        Leg pain     Objective:     Vital Signs (Most Recent):  Temp: 98.7 °F (37.1 °C) (04/24/17 1300)  Pulse: 97 (04/24/17 1300)  Resp: 20 (04/24/17 1300)  BP: (!) 140/77 (04/24/17 1300)  SpO2: (!) 94 % (04/24/17 1300) Vital Signs (24h Range):  Temp:  [98.2 °F (36.8 °C)-98.8 °F (37.1 °C)] 98.7 °F (37.1 °C)  Pulse:  [] 97  Resp:  [14-20] 20  SpO2:  [94 %-97 %] 94 %  BP: (138-142)/(66-86) 140/77     Weight: 136.1 kg (300 lb)  Body mass index is 54.87 kg/(m^2).    Estimated Creatinine Clearance: 143.9 mL/min (based on Cr of 0.7).    Physical Exam   Constitutional: She is oriented to person, place, and time. She appears well-developed and well-nourished.   obese   HENT:   Head: Normocephalic and atraumatic.   Eyes: Conjunctivae and EOM are normal.   Neck: Normal range of motion.   Cardiovascular: Normal rate and regular rhythm.    Pulmonary/Chest: Effort normal. She has wheezes.   Abdominal: Soft.    Musculoskeletal: She exhibits tenderness (leg).   Neurological: She is alert and oriented to person, place, and time.       Significant Labs: All pertinent labs within the past 24 hours have been reviewed.    Significant Imaging: I have reviewed all pertinent imaging results/findings within the past 24 hours.

## 2017-04-24 NOTE — PROGRESS NOTES
AOx4. Free from falls and safe. VSS and afebrile.    Pt has reported having delusional episodes x2. Pt told to inform nursing if further events occur. Pain control via Dilaudid c/ Benadryl coverage for pruritis.    Pt to continue abx tx via PO

## 2017-04-24 NOTE — ASSESSMENT & PLAN NOTE
36 yo female who has a PMH of Hypertension, Sickle cell-beta thalassemia disease with pain, and Trigeminal neuralgia who is admitted for sickle pain crisis. UA not concerning for infection. No AVN on hip.    -no clear source of infection, no bands. URI vs bronchitis most mynorley  -only had one isolated fever  -will send blood cx  -would stop vanco  -will change ctx and azithro to moxi po and monitor her for 24 hours

## 2017-04-24 NOTE — PROGRESS NOTES
MD paged: pt requesting med to prevent yeast inf.    Audible wheezing present; pt denying any distress. RT requested for PRN tx.

## 2017-04-24 NOTE — PLAN OF CARE
04/24/17 1135   Right Care Assessment   Can the patient answer the patient profile reliably? Yes, cognitively intact  (Patient resting and needs to be awakened on rounds  )   How often would a person be available to care for the patient? Occasionally   Describe the patient's ability to walk at the present time. No restrictions   How does the patient rate their overall health at the present time? Fair   Number of comorbid conditions (as recorded on the chart) Three   During the past month, has the patient often been bothered by feeling down, depressed or hopeless? No   During the past month, has the patient often been bothered by little interest or pleasure in doing things? No     Discharge pending improvement in pain and oxygen requirements.

## 2017-04-24 NOTE — CONSULTS
"[facility]  Infectious Disease  Consult Note    Patient Name: Nazanin Malone  MRN: [id number]  Admission Date: 4/16/2017  Hospital Length of Stay: 8 days  Attending Physician: Miguel Tanner MD  Primary Care Provider: Primary Doctor No     Isolation Status: No active isolations    Patient information was obtained from patient and past medical records.      Inpatient consult to Infectious Diseases  Consult performed by: JESSICA WESTBROOK  Consult ordered by: MIGUEL TANNER  Reason for consult: fever        Assessment/Plan:     * Sickle-cell disease with pain  36 yo female who has a PMH of Hypertension, Sickle cell-beta thalassemia disease with pain, and Trigeminal neuralgia who is admitted for sickle pain crisis. UA not concerning for infection. No AVN on hip.    -no clear source of infection, no bands. URI vs bronchitis most likley  -only had one isolated fever  -will send blood cx  -would stop vanco  -will change ctx and azithro to moxi po and monitor her for 24 hours      Thank you for your consult. I will follow-up with patient. Please contact us if you have any additional questions.    Jessica Westbrook MD  Infectious Disease  [facility]    Subjective:     Principal Problem: Sickle-cell disease with pain    HPI: 36 yo female who has a PMH of Hypertension, Sickle cell-beta thalassemia disease with pain, and Trigeminal neuralgia. The patient presented to [facility] on 4/16/2017 due to worsening right leg pain over the last week as well as a cough productive of green mucous. She states she came to ED 2 days PTA for her cough and was prescribed Augmentin. She says the mucous is no longer green, but she still feels all "stuffed up and congested." She has had to be admitted a few other times due to sickle pain crisis, but rarely needs transfusions.     She was initially started on CTX and azithro and then had a fever on 4/22. Vanco was added to next day. She has not had " another fever. She says she feels slightly better in terms of pain but her cough remains      Past Medical History:   Diagnosis Date    Hypertension     Sickle cell-beta thalassemia disease with pain     Trigeminal neuralgia        Past Surgical History:   Procedure Laterality Date     SECTION      TUBAL LIGATION         Review of patient's allergies indicates:   Allergen Reactions    Morphine Itching       Medications:  Prescriptions Prior to Admission   Medication Sig    [] amoxicillin-clavulanate 875-125mg (AUGMENTIN) 875-125 mg per tablet Take 1 tablet by mouth 2 (two) times daily.    baclofen (LIORESAL) 10 MG tablet Take 10 mg by mouth 2 (two) times daily.    benzonatate (TESSALON) 100 MG capsule Take 1 capsule (100 mg total) by mouth 3 (three) times daily as needed for Cough.    carbamazepine (TEGRETOL) 200 mg tablet Take 2 tablets (400 mg total) by mouth 2 (two) times daily.    fluoxetine (PROZAC) 40 MG capsule Take 40 mg by mouth once daily.    gabapentin (NEURONTIN) 300 MG capsule Take 600 mg by mouth 2 (two) times daily.    hydrOXYzine pamoate (VISTARIL) 25 MG Cap Take 25 mg by mouth 3 (three) times daily.    hydrOXYzine pamoate (VISTARIL) 50 MG Cap Take 50 mg by mouth once daily.    ondansetron (ZOFRAN-ODT) 8 MG TbDL Take 1 tablet (8 mg total) by mouth every 6 (six) hours as needed.    oxycodone-acetaminophen (PERCOCET)  mg per tablet Take 1 tablet by mouth every 6 (six) hours as needed for Pain.    [] predniSONE (DELTASONE) 20 MG tablet Take 3 tablets (60 mg total) by mouth once daily.     Antibiotics     Start     Stop Route Frequency Ordered    17 1215  cefTRIAXone (ROCEPHIN) 1 g in dextrose 5 % 50 mL IVPB      -- IV Every 24 hours (non-standard times) 17 1056    17 1300  azithromycin (ZITHROMAX) 500 mg in dextrose 5 % 250 mL IVPB      -- IV Every 24 hours (non-standard times) 17 1119    17 1715  vancomycin 1 g in dextrose 5 %  250 mL IVPB (ready to mix system)  (Vancomycin IVPB with levels panel)      -- IV Every 12 hours (non-standard times) 04/23/17 1614        Antifungals     None        Antivirals     None           There is no immunization history for the selected administration types on file for this patient.    Family History     None        Social History     Social History    Marital status: Single     Spouse name: N/A    Number of children: N/A    Years of education: N/A     Social History Main Topics    Smoking status: Never Smoker    Smokeless tobacco: None    Alcohol use None    Drug use: None    Sexual activity: Not Asked     Other Topics Concern    None     Social History Narrative     Review of Systems   Constitutional: Positive for activity change and fever. Negative for appetite change.   HENT: Positive for congestion and sinus pressure. Negative for ear pain, facial swelling and hearing loss.    Eyes: Negative.    Respiratory: Positive for cough and shortness of breath. Negative for apnea, choking and chest tightness.    Cardiovascular: Negative.    Gastrointestinal: Negative.    Musculoskeletal:        Leg pain     Objective:     Vital Signs (Most Recent):  Temp: 98.7 °F (37.1 °C) (04/24/17 1300)  Pulse: 97 (04/24/17 1300)  Resp: 20 (04/24/17 1300)  BP: (!) 140/77 (04/24/17 1300)  SpO2: (!) 94 % (04/24/17 1300) Vital Signs (24h Range):  Temp:  [98.2 °F (36.8 °C)-98.8 °F (37.1 °C)] 98.7 °F (37.1 °C)  Pulse:  [] 97  Resp:  [14-20] 20  SpO2:  [94 %-97 %] 94 %  BP: (138-142)/(66-86) 140/77     Weight: 136.1 kg (300 lb)  Body mass index is 54.87 kg/(m^2).    Estimated Creatinine Clearance: 143.9 mL/min (based on Cr of 0.7).    Physical Exam   Constitutional: She is oriented to person, place, and time. She appears well-developed and well-nourished.   obese   HENT:   Head: Normocephalic and atraumatic.   Eyes: Conjunctivae and EOM are normal.   Neck: Normal range of motion.   Cardiovascular: Normal rate and  regular rhythm.    Pulmonary/Chest: Effort normal. She has wheezes.   Abdominal: Soft.   Musculoskeletal: She exhibits tenderness (leg).   Neurological: She is alert and oriented to person, place, and time.       Significant Labs: All pertinent labs within the past 24 hours have been reviewed.    Significant Imaging: I have reviewed all pertinent imaging results/findings within the past 24 hours.

## 2017-04-25 PROBLEM — J13 PNEUMONIA DUE TO STREPTOCOCCUS PNEUMONIAE: Status: ACTIVE | Noted: 2017-04-25

## 2017-04-25 PROBLEM — E66.01 MORBID OBESITY DUE TO EXCESS CALORIES: Status: ACTIVE | Noted: 2017-04-25

## 2017-04-25 PROBLEM — J96.01 ACUTE RESPIRATORY FAILURE WITH HYPOXIA: Status: ACTIVE | Noted: 2017-04-25

## 2017-04-25 PROBLEM — J45.901 ASTHMA EXACERBATION: Status: ACTIVE | Noted: 2017-04-25

## 2017-04-25 PROBLEM — A40.3 SEPSIS DUE TO STREPTOCOCCUS PNEUMONIAE: Status: ACTIVE | Noted: 2017-04-25

## 2017-04-25 LAB
ALBUMIN SERPL BCP-MCNC: 3 G/DL
ALP SERPL-CCNC: 89 U/L
ALT SERPL W/O P-5'-P-CCNC: 21 U/L
ANION GAP SERPL CALC-SCNC: 8 MMOL/L
ANISOCYTOSIS BLD QL SMEAR: SLIGHT
AST SERPL-CCNC: 23 U/L
BACTERIA UR CULT: NORMAL
BASOPHILS # BLD AUTO: ABNORMAL K/UL
BASOPHILS NFR BLD: 0 %
BILIRUB SERPL-MCNC: 0.3 MG/DL
BUN SERPL-MCNC: 6 MG/DL
CALCIUM SERPL-MCNC: 8.9 MG/DL
CHLORIDE SERPL-SCNC: 106 MMOL/L
CO2 SERPL-SCNC: 26 MMOL/L
CREAT SERPL-MCNC: 0.7 MG/DL
DIFFERENTIAL METHOD: ABNORMAL
EOSINOPHIL # BLD AUTO: ABNORMAL K/UL
EOSINOPHIL NFR BLD: 4 %
ERYTHROCYTE [DISTWIDTH] IN BLOOD BY AUTOMATED COUNT: 20.1 %
EST. GFR  (AFRICAN AMERICAN): >60 ML/MIN/1.73 M^2
EST. GFR  (NON AFRICAN AMERICAN): >60 ML/MIN/1.73 M^2
GLUCOSE SERPL-MCNC: 94 MG/DL
HCT VFR BLD AUTO: 28.1 %
HGB BLD-MCNC: 9.1 G/DL
HYPOCHROMIA BLD QL SMEAR: ABNORMAL
LYMPHOCYTES # BLD AUTO: ABNORMAL K/UL
LYMPHOCYTES NFR BLD: 10 %
MAGNESIUM SERPL-MCNC: 1.9 MG/DL
MCH RBC QN AUTO: 23 PG
MCHC RBC AUTO-ENTMCNC: 32.4 %
MCV RBC AUTO: 71 FL
MONOCYTES # BLD AUTO: ABNORMAL K/UL
MONOCYTES NFR BLD: 6 %
NEUTROPHILS NFR BLD: 80 %
NRBC BLD-RTO: ABNORMAL /100 WBC
OVALOCYTES BLD QL SMEAR: ABNORMAL
PHOSPHATE SERPL-MCNC: 3.2 MG/DL
PLATELET # BLD AUTO: 139 K/UL
PMV BLD AUTO: 9.4 FL
POIKILOCYTOSIS BLD QL SMEAR: SLIGHT
POLYCHROMASIA BLD QL SMEAR: ABNORMAL
POTASSIUM SERPL-SCNC: 3.8 MMOL/L
PROT SERPL-MCNC: 6.9 G/DL
RBC # BLD AUTO: 3.95 M/UL
RETICS/RBC NFR AUTO: 5.2 %
SODIUM SERPL-SCNC: 140 MMOL/L
TARGETS BLD QL SMEAR: ABNORMAL
WBC # BLD AUTO: 13.9 K/UL
WBC NRBC COR # BLD: 12.19 K/UL

## 2017-04-25 PROCEDURE — 63600175 PHARM REV CODE 636 W HCPCS: Performed by: HOSPITALIST

## 2017-04-25 PROCEDURE — 94640 AIRWAY INHALATION TREATMENT: CPT

## 2017-04-25 PROCEDURE — 25000003 PHARM REV CODE 250: Performed by: HOSPITALIST

## 2017-04-25 PROCEDURE — 85027 COMPLETE CBC AUTOMATED: CPT

## 2017-04-25 PROCEDURE — 36415 COLL VENOUS BLD VENIPUNCTURE: CPT

## 2017-04-25 PROCEDURE — 25000003 PHARM REV CODE 250: Performed by: PHYSICIAN ASSISTANT

## 2017-04-25 PROCEDURE — 85045 AUTOMATED RETICULOCYTE COUNT: CPT

## 2017-04-25 PROCEDURE — 85007 BL SMEAR W/DIFF WBC COUNT: CPT

## 2017-04-25 PROCEDURE — 80053 COMPREHEN METABOLIC PANEL: CPT

## 2017-04-25 PROCEDURE — 84100 ASSAY OF PHOSPHORUS: CPT

## 2017-04-25 PROCEDURE — 11000001 HC ACUTE MED/SURG PRIVATE ROOM

## 2017-04-25 PROCEDURE — 25000003 PHARM REV CODE 250: Performed by: INTERNAL MEDICINE

## 2017-04-25 PROCEDURE — 94664 DEMO&/EVAL PT USE INHALER: CPT

## 2017-04-25 PROCEDURE — 94761 N-INVAS EAR/PLS OXIMETRY MLT: CPT

## 2017-04-25 PROCEDURE — 27000221 HC OXYGEN, UP TO 24 HOURS

## 2017-04-25 PROCEDURE — 83735 ASSAY OF MAGNESIUM: CPT

## 2017-04-25 PROCEDURE — 99232 SBSQ HOSP IP/OBS MODERATE 35: CPT | Mod: ,,, | Performed by: INTERNAL MEDICINE

## 2017-04-25 PROCEDURE — 99233 SBSQ HOSP IP/OBS HIGH 50: CPT | Mod: ,,, | Performed by: HOSPITALIST

## 2017-04-25 PROCEDURE — 25000242 PHARM REV CODE 250 ALT 637 W/ HCPCS: Performed by: NURSE PRACTITIONER

## 2017-04-25 RX ORDER — METHYLPREDNISOLONE SOD SUCC 125 MG
125 VIAL (EA) INJECTION EVERY 6 HOURS
Status: DISCONTINUED | OUTPATIENT
Start: 2017-04-25 | End: 2017-04-26

## 2017-04-25 RX ORDER — CARVEDILOL 12.5 MG/1
12.5 TABLET ORAL 2 TIMES DAILY
Status: DISCONTINUED | OUTPATIENT
Start: 2017-04-25 | End: 2017-04-27

## 2017-04-25 RX ORDER — GUAIFENESIN 600 MG/1
600 TABLET, EXTENDED RELEASE ORAL 2 TIMES DAILY
Status: DISCONTINUED | OUTPATIENT
Start: 2017-04-25 | End: 2017-05-05 | Stop reason: HOSPADM

## 2017-04-25 RX ORDER — FLUTICASONE FUROATE AND VILANTEROL 100; 25 UG/1; UG/1
1 POWDER RESPIRATORY (INHALATION) DAILY
Status: DISCONTINUED | OUTPATIENT
Start: 2017-04-25 | End: 2017-04-26

## 2017-04-25 RX ORDER — PROMETHAZINE HYDROCHLORIDE AND CODEINE PHOSPHATE 6.25; 1 MG/5ML; MG/5ML
5 SOLUTION ORAL EVERY 6 HOURS
Status: DISCONTINUED | OUTPATIENT
Start: 2017-04-25 | End: 2017-04-26

## 2017-04-25 RX ORDER — DIPHENHYDRAMINE HCL 25 MG
25 CAPSULE ORAL EVERY 6 HOURS PRN
Status: DISCONTINUED | OUTPATIENT
Start: 2017-04-25 | End: 2017-05-05 | Stop reason: HOSPADM

## 2017-04-25 RX ADMIN — HYDROMORPHONE HYDROCHLORIDE 3 MG: 1 INJECTION, SOLUTION INTRAMUSCULAR; INTRAVENOUS; SUBCUTANEOUS at 04:04

## 2017-04-25 RX ADMIN — DIPHENHYDRAMINE HYDROCHLORIDE 12.5 MG: 50 INJECTION, SOLUTION INTRAMUSCULAR; INTRAVENOUS at 12:04

## 2017-04-25 RX ADMIN — CARBAMAZEPINE 400 MG: 200 TABLET ORAL at 09:04

## 2017-04-25 RX ADMIN — AMLODIPINE BESYLATE 10 MG: 10 TABLET ORAL at 01:04

## 2017-04-25 RX ADMIN — IPRATROPIUM BROMIDE AND ALBUTEROL SULFATE 3 ML: .5; 3 SOLUTION RESPIRATORY (INHALATION) at 04:04

## 2017-04-25 RX ADMIN — HEPARIN SODIUM 5000 UNITS: 5000 INJECTION, SOLUTION INTRAVENOUS; SUBCUTANEOUS at 05:04

## 2017-04-25 RX ADMIN — Medication 3 ML: at 10:04

## 2017-04-25 RX ADMIN — PROMETHAZINE HYDROCHLORIDE 12.5 MG: 12.5 TABLET ORAL at 09:04

## 2017-04-25 RX ADMIN — PANTOPRAZOLE SODIUM 600 MG: 40 TABLET, DELAYED RELEASE ORAL at 11:04

## 2017-04-25 RX ADMIN — IBUPROFEN 400 MG: 400 TABLET, FILM COATED ORAL at 01:04

## 2017-04-25 RX ADMIN — SODIUM CHLORIDE: 0.9 INJECTION, SOLUTION INTRAVENOUS at 09:04

## 2017-04-25 RX ADMIN — HYDROMORPHONE HYDROCHLORIDE 3 MG: 1 INJECTION, SOLUTION INTRAMUSCULAR; INTRAVENOUS; SUBCUTANEOUS at 09:04

## 2017-04-25 RX ADMIN — FLUTICASONE FUROATE AND VILANTEROL TRIFENATATE 1 PUFF: 100; 25 POWDER RESPIRATORY (INHALATION) at 09:04

## 2017-04-25 RX ADMIN — SODIUM CHLORIDE: 0.9 INJECTION, SOLUTION INTRAVENOUS at 07:04

## 2017-04-25 RX ADMIN — FLUOXETINE 40 MG: 20 CAPSULE ORAL at 09:04

## 2017-04-25 RX ADMIN — IPRATROPIUM BROMIDE AND ALBUTEROL SULFATE 3 ML: .5; 3 SOLUTION RESPIRATORY (INHALATION) at 05:04

## 2017-04-25 RX ADMIN — CARVEDILOL 12.5 MG: 12.5 TABLET, FILM COATED ORAL at 09:04

## 2017-04-25 RX ADMIN — IPRATROPIUM BROMIDE AND ALBUTEROL SULFATE 3 ML: .5; 3 SOLUTION RESPIRATORY (INHALATION) at 08:04

## 2017-04-25 RX ADMIN — GUAIFENESIN AND DEXTROMETHORPHAN HYDROBROMIDE 1 TABLET: 600; 30 TABLET, EXTENDED RELEASE ORAL at 10:04

## 2017-04-25 RX ADMIN — GABAPENTIN 600 MG: 300 CAPSULE ORAL at 09:04

## 2017-04-25 RX ADMIN — MOXIFLOXACIN HYDROCHLORIDE 400 MG: 400 TABLET, FILM COATED ORAL at 09:04

## 2017-04-25 RX ADMIN — PROMETHAZINE HYDROCHLORIDE 12.5 MG: 12.5 TABLET ORAL at 04:04

## 2017-04-25 RX ADMIN — BACLOFEN 10 MG: 10 TABLET ORAL at 09:04

## 2017-04-25 RX ADMIN — IBUPROFEN 400 MG: 400 TABLET, FILM COATED ORAL at 05:04

## 2017-04-25 RX ADMIN — CETIRIZINE HYDROCHLORIDE 5 MG: 5 TABLET, FILM COATED ORAL at 09:04

## 2017-04-25 RX ADMIN — OXYCODONE HYDROCHLORIDE 10 MG: 5 TABLET ORAL at 05:04

## 2017-04-25 RX ADMIN — HYDROMORPHONE HYDROCHLORIDE 3 MG: 1 INJECTION, SOLUTION INTRAMUSCULAR; INTRAVENOUS; SUBCUTANEOUS at 07:04

## 2017-04-25 RX ADMIN — HYDROMORPHONE HYDROCHLORIDE 3 MG: 1 INJECTION, SOLUTION INTRAMUSCULAR; INTRAVENOUS; SUBCUTANEOUS at 11:04

## 2017-04-25 RX ADMIN — GUAIFENESIN AND DEXTROMETHORPHAN HYDROBROMIDE 1 TABLET: 600; 30 TABLET, EXTENDED RELEASE ORAL at 09:04

## 2017-04-25 RX ADMIN — PANTOPRAZOLE SODIUM 600 MG: 40 TABLET, DELAYED RELEASE ORAL at 09:04

## 2017-04-25 RX ADMIN — HEPARIN SODIUM 5000 UNITS: 5000 INJECTION, SOLUTION INTRAVENOUS; SUBCUTANEOUS at 10:04

## 2017-04-25 RX ADMIN — FLUTICASONE PROPIONATE 2 SPRAY: 50 SPRAY, METERED NASAL at 09:04

## 2017-04-25 RX ADMIN — STANDARDIZED SENNA CONCENTRATE AND DOCUSATE SODIUM 1 TABLET: 8.6; 5 TABLET, FILM COATED ORAL at 09:04

## 2017-04-25 RX ADMIN — Medication 3 ML: at 07:04

## 2017-04-25 RX ADMIN — DIPHENHYDRAMINE HYDROCHLORIDE 12.5 MG: 50 INJECTION, SOLUTION INTRAMUSCULAR; INTRAVENOUS at 07:04

## 2017-04-25 RX ADMIN — IPRATROPIUM BROMIDE AND ALBUTEROL SULFATE 3 ML: .5; 3 SOLUTION RESPIRATORY (INHALATION) at 01:04

## 2017-04-25 RX ADMIN — PROMETHAZINE HYDROCHLORIDE AND CODEINE PHOSPHATE 5 ML: 6.25; 1 SYRUP ORAL at 11:04

## 2017-04-25 RX ADMIN — HYDROMORPHONE HYDROCHLORIDE 3 MG: 1 INJECTION, SOLUTION INTRAMUSCULAR; INTRAVENOUS; SUBCUTANEOUS at 12:04

## 2017-04-25 RX ADMIN — PREDNISONE 50 MG: 10 TABLET ORAL at 09:04

## 2017-04-25 RX ADMIN — METHYLPREDNISOLONE SODIUM SUCCINATE 125 MG: 125 INJECTION, POWDER, FOR SOLUTION INTRAMUSCULAR; INTRAVENOUS at 11:04

## 2017-04-25 RX ADMIN — IPRATROPIUM BROMIDE AND ALBUTEROL SULFATE 3 ML: .5; 3 SOLUTION RESPIRATORY (INHALATION) at 11:04

## 2017-04-25 RX ADMIN — IBUPROFEN 400 MG: 400 TABLET, FILM COATED ORAL at 11:04

## 2017-04-25 RX ADMIN — DIPHENHYDRAMINE HYDROCHLORIDE 25 MG: 25 CAPSULE ORAL at 10:04

## 2017-04-25 RX ADMIN — HEPARIN SODIUM 5000 UNITS: 5000 INJECTION, SOLUTION INTRAVENOUS; SUBCUTANEOUS at 01:04

## 2017-04-25 RX ADMIN — METHYLPREDNISOLONE SODIUM SUCCINATE 125 MG: 125 INJECTION, POWDER, FOR SOLUTION INTRAMUSCULAR; INTRAVENOUS at 05:04

## 2017-04-25 RX ADMIN — MAGNESIUM SULFATE HEPTAHYDRATE 3 G: 500 INJECTION, SOLUTION INTRAMUSCULAR; INTRAVENOUS at 12:04

## 2017-04-25 RX ADMIN — BENZONATATE 100 MG: 100 CAPSULE ORAL at 04:04

## 2017-04-25 RX ADMIN — PROMETHAZINE HYDROCHLORIDE AND CODEINE PHOSPHATE 5 ML: 6.25; 1 SYRUP ORAL at 05:04

## 2017-04-25 NOTE — PROGRESS NOTES
Patient with c/o shortness of breath, pulse ox 94% on 3 L NC. Breathing treatment admin at 412 this am. Exp wheezing audible on auscultation. No acute distress noted. Slightly tachypneic.  rr 24.  Notified Resp therapist for PRN order. Will monitor.

## 2017-04-25 NOTE — SUBJECTIVE & OBJECTIVE
Interval History: Felt more SOB earlier today but improving now    Review of Systems   Constitutional: Positive for activity change and fever. Negative for appetite change.   HENT: Positive for congestion and sinus pressure. Negative for ear pain, facial swelling and hearing loss.    Eyes: Negative.    Respiratory: Positive for cough and shortness of breath. Negative for apnea, choking and chest tightness.    Cardiovascular: Negative.    Gastrointestinal: Negative.    Musculoskeletal:        Leg pain     Objective:     Vital Signs (Most Recent):  Temp: 98.5 °F (36.9 °C) (04/25/17 0720)  Pulse: 98 (04/25/17 1145)  Resp: 20 (04/25/17 1145)  BP: (!) 150/70 (04/25/17 0720)  SpO2: (!) 93 % (04/25/17 1145) Vital Signs (24h Range):  Temp:  [98.3 °F (36.8 °C)-99.1 °F (37.3 °C)] 98.5 °F (36.9 °C)  Pulse:  [] 98  Resp:  [17-25] 20  SpO2:  [93 %-99 %] 93 %  BP: (132-150)/(70-92) 150/70     Weight: 136.1 kg (300 lb)  Body mass index is 54.87 kg/(m^2).    Estimated Creatinine Clearance: 143.9 mL/min (based on Cr of 0.7).    Physical Exam   Constitutional: She is oriented to person, place, and time. She appears well-developed and well-nourished.   obese   HENT:   Head: Normocephalic and atraumatic.   Eyes: Conjunctivae and EOM are normal.   Neck: Normal range of motion.   Cardiovascular: Normal rate and regular rhythm.    Pulmonary/Chest: Effort normal. She has wheezes.   Abdominal: Soft.   Musculoskeletal: She exhibits tenderness (leg).   Neurological: She is alert and oriented to person, place, and time.       Significant Labs: All pertinent labs within the past 24 hours have been reviewed.    Significant Imaging: I have reviewed all pertinent imaging results/findings within the past 24 hours.

## 2017-04-25 NOTE — PROGRESS NOTES
"Progress Note   Hospital Medicine     Admit Date: 2017  Length of Stay:  LOS: 8 days   Principal Problem:Sickle-cell disease with pain     HPI and Hospital Course: Nazanin Malone is a 37 y.o. female who  has a PMH of Hypertension; Sickle cell-beta thalassemia disease with pain; and Trigeminal neuralgia. The patient presented to [facility] on 2017 due to worsening right leg pain over the last week as well as a cough productive of green mucous. She states she came to ED 2 days ago for her cough and was prescribed Augmentin. She says the mucous is no longer green, but she still feels all "stuffed up and congested." She has had to be admitted a few other times due to sickle pain crisis, but rarely needs transfusions. Persistently asking for more pain medications,     Interval History: Patient with audible wheezing on PO Steroids and Dilaudid, Seen by ID who swithched antibiotics to Avelox.     Respiratory: positive for cough and dyspnea on exertion  Cardiovascular: negative  Gastrointestinal: negative  Genitourinary:negative  Neurological: negative    Objective:     Last 24 Hour Vital Signs:  BP  Min: 133/71  Max: 142/83  Temp  Av.5 °F (36.9 °C)  Min: 98.2 °F (36.8 °C)  Max: 98.7 °F (37.1 °C)  Pulse  Av.8  Min: 86  Max: 108  Resp  Av.5  Min: 16  Max: 20  SpO2  Av.6 %  Min: 94 %  Max: 97 %  Body mass index is 54.87 kg/(m^2).  I/O last 3 completed shifts:  In: 540 [P.O.:540]  Out: -     Physical Examination:  General appearance: alert, appears stated age and cooperative  Head: Normocephalic, without obvious abnormality, atraumatic  Neck: no adenopathy, no carotid bruit, no JVD, supple, symmetrical, trachea midline and thyroid not enlarged, symmetric, no tenderness/mass/nodules  Lungs: decreased BS RLL  Heart: regular rate and rhythm, S1, S2 normal, no murmur, click, rub or gallop  Abdomen: soft, non-tender; bowel sounds normal; no masses,  no organomegaly  Extremities: " extremities normal, atraumatic, no cyanosis or edema  Neurologic: Grossly normal      Assessment:     Nazanin Malone is a 37 y.o. female with:  Active Hospital Problems    Diagnosis    *Sickle-cell disease with pain    Sepsis due to Gram negative bacteria    Bronchitis    Benign essential HTN    Opioid dependence       Plan:     # Sickle Cell pain crisis  # opioid dependence  - pt having complaints typical of her pain crisis.  - MRI of the right HIP did not reveal any evidence of avascular necrosis  - Dilaudid 3 gm q 3 hrs  - Seen by Hematology who recommended increasing pain medications      # Probable Gram negative Pneumonia / CAP  - On Avelox  - seen by ID   - continue oxygen as needed. Will continue to monitor.    - added mucinex for cough suppresant  - acapella every 4 hrs PRN     #Sepsis due to gram negative organism  - Urine and urine cultures NGTD  - blood cs  - On PO Avelox  - ID consult: Enio ceftriaxone, Vancomycin and Azithromycin     #Bronchitis   Duonebs PRN  Start Prednisone 50mg daily  Advair    # Benign Essential HTN  - continue home Norvasc  - would add ace if BP stays elevated after adequate pain control     # RLE swelling and pain  - BLE ultrasound negative for DVT  MRI of the Hip revealed periostitis, negative for Avascular necrosis    DVT/GI ppx  - Hep SQ  - regular diet     Miguel Tanner MD  [facility] Medicine Attending  Spectralink: [id number]  04/24/2017

## 2017-04-25 NOTE — PLAN OF CARE
Problem: Patient Care Overview  Goal: Plan of Care Review  Outcome: Ongoing (interventions implemented as appropriate)  Plan of care reviewed with patient. Fall precautions reinforced secondary to numerous pain medication. Prn pain management regimen reviewed. Updated on white board in room. IVF continued. Bed is low and locked., call light within reach. Will reinforce.

## 2017-04-25 NOTE — PROGRESS NOTES
[facility]  Infectious Disease  Progress Note    Patient Name: Nazanin Malone  MRN: [id number]  Admission Date: 4/16/2017  Length of Stay: 9 days  Attending Physician: Raymond Palacio MD  Primary Care Provider: Primary Doctor No    Isolation Status: No active isolations  Assessment/Plan:      * Sickle-cell disease with pain  36 yo female who has a PMH of Hypertension, Sickle cell-beta thalassemia disease with pain, and Trigeminal neuralgia who is admitted for sickle pain crisis. UA not concerning for infection. No AVN on hip.    -no clear source of infection, no bands. URI vs bronchitis most likley  -continue moxi  -per patient she uses her rescue inhaler 3-4 times daily so may need better control      Thank you for your consult. I will follow-up with patient. Please contact us if you have any additional questions.    Keyur Gonzalez MD  Infectious Disease  [facility]    Subjective:     Principal Problem:Sickle-cell disease with pain    HPI: See consult for HPI  Interval History: Felt more SOB earlier today but improving now    Review of Systems   Constitutional: Positive for activity change and fever. Negative for appetite change.   HENT: Positive for congestion and sinus pressure. Negative for ear pain, facial swelling and hearing loss.    Eyes: Negative.    Respiratory: Positive for cough and shortness of breath. Negative for apnea, choking and chest tightness.    Cardiovascular: Negative.    Gastrointestinal: Negative.    Musculoskeletal:        Leg pain     Objective:     Vital Signs (Most Recent):  Temp: 98.5 °F (36.9 °C) (04/25/17 0720)  Pulse: 98 (04/25/17 1145)  Resp: 20 (04/25/17 1145)  BP: (!) 150/70 (04/25/17 0720)  SpO2: (!) 93 % (04/25/17 1145) Vital Signs (24h Range):  Temp:  [98.3 °F (36.8 °C)-99.1 °F (37.3 °C)] 98.5 °F (36.9 °C)  Pulse:  [] 98  Resp:  [17-25] 20  SpO2:  [93 %-99 %] 93 %  BP: (132-150)/(70-92) 150/70     Weight: 136.1 kg (300 lb)  Body  mass index is 54.87 kg/(m^2).    Estimated Creatinine Clearance: 143.9 mL/min (based on Cr of 0.7).    Physical Exam   Constitutional: She is oriented to person, place, and time. She appears well-developed and well-nourished.   obese   HENT:   Head: Normocephalic and atraumatic.   Eyes: Conjunctivae and EOM are normal.   Neck: Normal range of motion.   Cardiovascular: Normal rate and regular rhythm.    Pulmonary/Chest: Effort normal. She has wheezes.   Abdominal: Soft.   Musculoskeletal: She exhibits tenderness (leg).   Neurological: She is alert and oriented to person, place, and time.       Significant Labs: All pertinent labs within the past 24 hours have been reviewed.    Significant Imaging: I have reviewed all pertinent imaging results/findings within the past 24 hours.

## 2017-04-25 NOTE — PLAN OF CARE
Problem: Patient Care Overview  Goal: Plan of Care Review  Outcome: Ongoing (interventions implemented as appropriate)  Pt remains AOx4. Free from falls and safe. VSS and afebrile.     Pt has maintained adequate O2 via continuous 3L NC and RT care.     Pt to continue abx and monitor for O2 desat and pain control.

## 2017-04-25 NOTE — ASSESSMENT & PLAN NOTE
38 yo female who has a PMH of Hypertension, Sickle cell-beta thalassemia disease with pain, and Trigeminal neuralgia who is admitted for sickle pain crisis. UA not concerning for infection. No AVN on hip.    -no clear source of infection, no bands. URI vs bronchitis most misael  -continue moxi  -per patient she uses her rescue inhaler 3-4 times daily so may need better control

## 2017-04-25 NOTE — PROGRESS NOTES
"Pt C/O of bilat leg pain. Describes as "tingling." States that R leg hurts more.    MD to be notified during rounds.  "

## 2017-04-26 LAB
ALBUMIN SERPL BCP-MCNC: 3.3 G/DL
ALP SERPL-CCNC: 98 U/L
ALT SERPL W/O P-5'-P-CCNC: 25 U/L
ANION GAP SERPL CALC-SCNC: 13 MMOL/L
ANISOCYTOSIS BLD QL SMEAR: ABNORMAL
AST SERPL-CCNC: 24 U/L
BASOPHILS # BLD AUTO: ABNORMAL K/UL
BASOPHILS NFR BLD: 0 %
BILIRUB SERPL-MCNC: 0.4 MG/DL
BUN SERPL-MCNC: 12 MG/DL
CALCIUM SERPL-MCNC: 8.9 MG/DL
CHLORIDE SERPL-SCNC: 104 MMOL/L
CO2 SERPL-SCNC: 23 MMOL/L
CREAT SERPL-MCNC: 0.7 MG/DL
DIFFERENTIAL METHOD: ABNORMAL
EOSINOPHIL # BLD AUTO: ABNORMAL K/UL
EOSINOPHIL NFR BLD: 0 %
ERYTHROCYTE [DISTWIDTH] IN BLOOD BY AUTOMATED COUNT: 19.9 %
EST. GFR  (AFRICAN AMERICAN): >60 ML/MIN/1.73 M^2
EST. GFR  (NON AFRICAN AMERICAN): >60 ML/MIN/1.73 M^2
GLUCOSE SERPL-MCNC: 162 MG/DL
HCT VFR BLD AUTO: 28.8 %
HGB BLD-MCNC: 9.6 G/DL
HOWELL-JOLLY BOD BLD QL SMEAR: ABNORMAL
LYMPHOCYTES # BLD AUTO: ABNORMAL K/UL
LYMPHOCYTES NFR BLD: 6 %
MAGNESIUM SERPL-MCNC: 2.3 MG/DL
MCH RBC QN AUTO: 22.9 PG
MCHC RBC AUTO-ENTMCNC: 33.3 %
MCV RBC AUTO: 69 FL
MONOCYTES # BLD AUTO: ABNORMAL K/UL
MONOCYTES NFR BLD: 3 %
NEUTROPHILS NFR BLD: 90 %
NEUTS BAND NFR BLD MANUAL: 1 %
NRBC BLD-RTO: ABNORMAL /100 WBC
PHOSPHATE SERPL-MCNC: 2.8 MG/DL
PLATELET # BLD AUTO: 188 K/UL
PLATELET BLD QL SMEAR: ABNORMAL
PMV BLD AUTO: 9.1 FL
POIKILOCYTOSIS BLD QL SMEAR: SLIGHT
POLYCHROMASIA BLD QL SMEAR: ABNORMAL
POTASSIUM SERPL-SCNC: 4.1 MMOL/L
PROT SERPL-MCNC: 7.5 G/DL
RBC # BLD AUTO: 4.2 M/UL
RETICS/RBC NFR AUTO: 5.1 %
SODIUM SERPL-SCNC: 140 MMOL/L
TARGETS BLD QL SMEAR: ABNORMAL
WBC # BLD AUTO: 18.36 K/UL
WBC NRBC COR # BLD: 15.43 K/UL

## 2017-04-26 PROCEDURE — 25000003 PHARM REV CODE 250: Performed by: HOSPITALIST

## 2017-04-26 PROCEDURE — 63600175 PHARM REV CODE 636 W HCPCS: Performed by: HOSPITALIST

## 2017-04-26 PROCEDURE — 94640 AIRWAY INHALATION TREATMENT: CPT

## 2017-04-26 PROCEDURE — 85007 BL SMEAR W/DIFF WBC COUNT: CPT

## 2017-04-26 PROCEDURE — 85027 COMPLETE CBC AUTOMATED: CPT

## 2017-04-26 PROCEDURE — 27000221 HC OXYGEN, UP TO 24 HOURS

## 2017-04-26 PROCEDURE — 84100 ASSAY OF PHOSPHORUS: CPT

## 2017-04-26 PROCEDURE — 85045 AUTOMATED RETICULOCYTE COUNT: CPT

## 2017-04-26 PROCEDURE — 83735 ASSAY OF MAGNESIUM: CPT

## 2017-04-26 PROCEDURE — 99233 SBSQ HOSP IP/OBS HIGH 50: CPT | Mod: ,,, | Performed by: HOSPITALIST

## 2017-04-26 PROCEDURE — 80053 COMPREHEN METABOLIC PANEL: CPT

## 2017-04-26 PROCEDURE — 99232 SBSQ HOSP IP/OBS MODERATE 35: CPT | Mod: ,,, | Performed by: INTERNAL MEDICINE

## 2017-04-26 PROCEDURE — 25000003 PHARM REV CODE 250: Performed by: INTERNAL MEDICINE

## 2017-04-26 PROCEDURE — 94664 DEMO&/EVAL PT USE INHALER: CPT

## 2017-04-26 PROCEDURE — 97802 MEDICAL NUTRITION INDIV IN: CPT

## 2017-04-26 PROCEDURE — 97161 PT EVAL LOW COMPLEX 20 MIN: CPT

## 2017-04-26 PROCEDURE — 94761 N-INVAS EAR/PLS OXIMETRY MLT: CPT

## 2017-04-26 PROCEDURE — 36415 COLL VENOUS BLD VENIPUNCTURE: CPT

## 2017-04-26 PROCEDURE — 25000242 PHARM REV CODE 250 ALT 637 W/ HCPCS: Performed by: NURSE PRACTITIONER

## 2017-04-26 PROCEDURE — 25000003 PHARM REV CODE 250: Performed by: PHYSICIAN ASSISTANT

## 2017-04-26 PROCEDURE — 11000001 HC ACUTE MED/SURG PRIVATE ROOM

## 2017-04-26 RX ORDER — METHYLPREDNISOLONE SOD SUCC 125 MG
80 VIAL (EA) INJECTION EVERY 8 HOURS
Status: DISCONTINUED | OUTPATIENT
Start: 2017-04-26 | End: 2017-04-28

## 2017-04-26 RX ORDER — PROMETHAZINE HYDROCHLORIDE AND CODEINE PHOSPHATE 6.25; 1 MG/5ML; MG/5ML
5 SOLUTION ORAL EVERY 6 HOURS PRN
Status: DISCONTINUED | OUTPATIENT
Start: 2017-04-26 | End: 2017-05-05 | Stop reason: HOSPADM

## 2017-04-26 RX ADMIN — GABAPENTIN 600 MG: 300 CAPSULE ORAL at 08:04

## 2017-04-26 RX ADMIN — IPRATROPIUM BROMIDE AND ALBUTEROL SULFATE 3 ML: .5; 3 SOLUTION RESPIRATORY (INHALATION) at 08:04

## 2017-04-26 RX ADMIN — CARBAMAZEPINE 400 MG: 200 TABLET ORAL at 09:04

## 2017-04-26 RX ADMIN — DIPHENHYDRAMINE HYDROCHLORIDE 25 MG: 25 CAPSULE ORAL at 08:04

## 2017-04-26 RX ADMIN — IPRATROPIUM BROMIDE AND ALBUTEROL SULFATE 3 ML: .5; 3 SOLUTION RESPIRATORY (INHALATION) at 04:04

## 2017-04-26 RX ADMIN — PROMETHAZINE HYDROCHLORIDE AND CODEINE PHOSPHATE 5 ML: 6.25; 1 SYRUP ORAL at 06:04

## 2017-04-26 RX ADMIN — OXYCODONE HYDROCHLORIDE 10 MG: 5 TABLET ORAL at 08:04

## 2017-04-26 RX ADMIN — IBUPROFEN 400 MG: 400 TABLET, FILM COATED ORAL at 08:04

## 2017-04-26 RX ADMIN — CETIRIZINE HYDROCHLORIDE 5 MG: 5 TABLET, FILM COATED ORAL at 09:04

## 2017-04-26 RX ADMIN — SODIUM CHLORIDE 1 ML: 0.9 INJECTION, SOLUTION INTRAVENOUS at 11:04

## 2017-04-26 RX ADMIN — IPRATROPIUM BROMIDE AND ALBUTEROL SULFATE 3 ML: .5; 3 SOLUTION RESPIRATORY (INHALATION) at 11:04

## 2017-04-26 RX ADMIN — STANDARDIZED SENNA CONCENTRATE AND DOCUSATE SODIUM 1 TABLET: 8.6; 5 TABLET, FILM COATED ORAL at 09:04

## 2017-04-26 RX ADMIN — HEPARIN SODIUM 5000 UNITS: 5000 INJECTION, SOLUTION INTRAVENOUS; SUBCUTANEOUS at 05:04

## 2017-04-26 RX ADMIN — IPRATROPIUM BROMIDE AND ALBUTEROL SULFATE 3 ML: .5; 3 SOLUTION RESPIRATORY (INHALATION) at 07:04

## 2017-04-26 RX ADMIN — MAGNESIUM SULFATE HEPTAHYDRATE 3 G: 500 INJECTION, SOLUTION INTRAMUSCULAR; INTRAVENOUS at 04:04

## 2017-04-26 RX ADMIN — HYDROMORPHONE HYDROCHLORIDE 3 MG: 1 INJECTION, SOLUTION INTRAMUSCULAR; INTRAVENOUS; SUBCUTANEOUS at 03:04

## 2017-04-26 RX ADMIN — HYDROMORPHONE HYDROCHLORIDE 3 MG: 1 INJECTION, SOLUTION INTRAMUSCULAR; INTRAVENOUS; SUBCUTANEOUS at 11:04

## 2017-04-26 RX ADMIN — METHYLPREDNISOLONE SODIUM SUCCINATE 125 MG: 125 INJECTION, POWDER, FOR SOLUTION INTRAMUSCULAR; INTRAVENOUS at 11:04

## 2017-04-26 RX ADMIN — HYDROMORPHONE HYDROCHLORIDE 3 MG: 1 INJECTION, SOLUTION INTRAMUSCULAR; INTRAVENOUS; SUBCUTANEOUS at 06:04

## 2017-04-26 RX ADMIN — Medication 3 ML: at 06:04

## 2017-04-26 RX ADMIN — HYDROMORPHONE HYDROCHLORIDE 3 MG: 1 INJECTION, SOLUTION INTRAMUSCULAR; INTRAVENOUS; SUBCUTANEOUS at 04:04

## 2017-04-26 RX ADMIN — STANDARDIZED SENNA CONCENTRATE AND DOCUSATE SODIUM 1 TABLET: 8.6; 5 TABLET, FILM COATED ORAL at 08:04

## 2017-04-26 RX ADMIN — PROMETHAZINE HYDROCHLORIDE 12.5 MG: 12.5 TABLET ORAL at 05:04

## 2017-04-26 RX ADMIN — PANTOPRAZOLE SODIUM 600 MG: 40 TABLET, DELAYED RELEASE ORAL at 08:04

## 2017-04-26 RX ADMIN — GABAPENTIN 600 MG: 300 CAPSULE ORAL at 09:04

## 2017-04-26 RX ADMIN — METHYLPREDNISOLONE SODIUM SUCCINATE 125 MG: 125 INJECTION, POWDER, FOR SOLUTION INTRAMUSCULAR; INTRAVENOUS at 05:04

## 2017-04-26 RX ADMIN — CARBAMAZEPINE 400 MG: 200 TABLET ORAL at 08:04

## 2017-04-26 RX ADMIN — IPRATROPIUM BROMIDE AND ALBUTEROL SULFATE 3 ML: .5; 3 SOLUTION RESPIRATORY (INHALATION) at 05:04

## 2017-04-26 RX ADMIN — FLUTICASONE FUROATE AND VILANTEROL TRIFENATATE 1 PUFF: 100; 25 POWDER RESPIRATORY (INHALATION) at 09:04

## 2017-04-26 RX ADMIN — FLUTICASONE PROPIONATE 2 SPRAY: 50 SPRAY, METERED NASAL at 09:04

## 2017-04-26 RX ADMIN — IPRATROPIUM BROMIDE AND ALBUTEROL SULFATE 3 ML: .5; 3 SOLUTION RESPIRATORY (INHALATION) at 12:04

## 2017-04-26 RX ADMIN — PROMETHAZINE HYDROCHLORIDE AND CODEINE PHOSPHATE 5 ML: 6.25; 1 SYRUP ORAL at 12:04

## 2017-04-26 RX ADMIN — DIPHENHYDRAMINE HYDROCHLORIDE 25 MG: 25 CAPSULE ORAL at 05:04

## 2017-04-26 RX ADMIN — HEPARIN SODIUM 5000 UNITS: 5000 INJECTION, SOLUTION INTRAVENOUS; SUBCUTANEOUS at 01:04

## 2017-04-26 RX ADMIN — MOXIFLOXACIN HYDROCHLORIDE 400 MG: 400 TABLET, FILM COATED ORAL at 09:04

## 2017-04-26 RX ADMIN — HEPARIN SODIUM 5000 UNITS: 5000 INJECTION, SOLUTION INTRAVENOUS; SUBCUTANEOUS at 08:04

## 2017-04-26 RX ADMIN — AMLODIPINE BESYLATE 10 MG: 10 TABLET ORAL at 09:04

## 2017-04-26 RX ADMIN — METHYLPREDNISOLONE SODIUM SUCCINATE 80 MG: 125 INJECTION, POWDER, FOR SOLUTION INTRAMUSCULAR; INTRAVENOUS at 08:04

## 2017-04-26 RX ADMIN — Medication 3 ML: at 04:04

## 2017-04-26 RX ADMIN — IBUPROFEN 400 MG: 400 TABLET, FILM COATED ORAL at 01:04

## 2017-04-26 RX ADMIN — BACLOFEN 10 MG: 10 TABLET ORAL at 09:04

## 2017-04-26 RX ADMIN — BACLOFEN 10 MG: 10 TABLET ORAL at 08:04

## 2017-04-26 RX ADMIN — IBUPROFEN 400 MG: 400 TABLET, FILM COATED ORAL at 05:04

## 2017-04-26 RX ADMIN — Medication 3 ML: at 10:04

## 2017-04-26 RX ADMIN — FLUOXETINE 40 MG: 20 CAPSULE ORAL at 09:04

## 2017-04-26 RX ADMIN — CARVEDILOL 12.5 MG: 12.5 TABLET, FILM COATED ORAL at 09:04

## 2017-04-26 RX ADMIN — METHYLPREDNISOLONE SODIUM SUCCINATE 125 MG: 125 INJECTION, POWDER, FOR SOLUTION INTRAMUSCULAR; INTRAVENOUS at 12:04

## 2017-04-26 RX ADMIN — CARVEDILOL 12.5 MG: 12.5 TABLET, FILM COATED ORAL at 08:04

## 2017-04-26 RX ADMIN — PROMETHAZINE HYDROCHLORIDE AND CODEINE PHOSPHATE 5 ML: 6.25; 1 SYRUP ORAL at 11:04

## 2017-04-26 RX ADMIN — HYDROMORPHONE HYDROCHLORIDE 3 MG: 1 INJECTION, SOLUTION INTRAMUSCULAR; INTRAVENOUS; SUBCUTANEOUS at 12:04

## 2017-04-26 RX ADMIN — HYDROMORPHONE HYDROCHLORIDE 3 MG: 1 INJECTION, SOLUTION INTRAMUSCULAR; INTRAVENOUS; SUBCUTANEOUS at 10:04

## 2017-04-26 RX ADMIN — PANTOPRAZOLE SODIUM 600 MG: 40 TABLET, DELAYED RELEASE ORAL at 11:04

## 2017-04-26 NOTE — PROGRESS NOTES
"[facility] Hospital Medicine  Progress Note    Patient Name: Nazanin Malone  MRN: [id number]  Patient Class: IP- Inpatient   Admission Date: 4/16/2017  Length of Stay: 10 days  Attending Physician: Raymond Palacio MD  Primary Care Provider: Primary Doctor [facility] Medicine Team: [facility] MED A Raymond Palacio MD    Subjective:     Principal Problem:Sickle-cell disease with pain    HPI:  Nazanin Malone is a 37 y.o. female who  has a PMH of Hypertension; Sickle cell-beta thalassemia disease with pain; and Trigeminal neuralgia. The patient presented to [facility] on 4/16/2017 due to worsening right leg pain over the last week as well as a cough productive of green mucous. She states she came to ED 2 days ago for her cough and was prescribed Augmentin. She says the mucous is no longer green, but she still feels all "stuffed up and congested." She has had to be admitted a few other times due to sickle pain crisis, but rarely needs transfusions. Persistently asking for more pain medications,     Hospital Course:  4/25- audible wheezing; afebrile; switched to solumedrol    4/26/17- patient is no longer wheezing and feels much better today      Interval History: patient is feeling much better today; wheezing has stopped and SOB has improved; will scale back on her pain medications and her solumedrol today     Review of Systems   Constitutional: Negative for chills and fever.   HENT: Negative for rhinorrhea and sore throat.    Eyes: Negative for pain and discharge.   Respiratory: Positive for cough and shortness of breath. Negative for wheezing.    Cardiovascular: Negative for chest pain and leg swelling.   Gastrointestinal: Negative for abdominal distention, abdominal pain, blood in stool, nausea and vomiting.   Endocrine: Negative for cold intolerance and heat intolerance.   Genitourinary: Negative for dysuria and flank pain.   Neurological: Negative for dizziness and numbness. "   Psychiatric/Behavioral: Negative for behavioral problems and confusion.     Objective:     Vital Signs (Most Recent):  Temp: 98.7 °F (37.1 °C) (04/26/17 1100)  Pulse: 85 (04/26/17 1243)  Resp: 17 (04/26/17 1243)  BP: 138/88 (04/26/17 1100)  SpO2: 97 % (04/26/17 1243) Vital Signs (24h Range):  Temp:  [97.8 °F (36.6 °C)-99.1 °F (37.3 °C)] 98.7 °F (37.1 °C)  Pulse:  [] 85  Resp:  [16-20] 17  SpO2:  [93 %-99 %] 97 %  BP: (138-191)/() 138/88     Weight: 136.1 kg (300 lb)  Body mass index is 54.87 kg/(m^2).  No intake or output data in the 24 hours ending 04/26/17 1416   Physical Exam   Constitutional: She is oriented to person, place, and time. She appears well-developed and well-nourished.   HENT:   Head: Normocephalic and atraumatic.   Eyes: Conjunctivae are normal. Pupils are equal, round, and reactive to light.   Neck: Normal range of motion. No thyromegaly present.   Cardiovascular: Normal rate, regular rhythm and normal heart sounds.    Pulmonary/Chest: No respiratory distress. She has no wheezes.   Abdominal: Soft. She exhibits no distension. There is no tenderness.   Musculoskeletal: Normal range of motion. She exhibits no edema.   Neurological: She is alert and oriented to person, place, and time.   Skin: No erythema. No pallor.       Significant Labs:   CBC:     Recent Labs  Lab 04/25/17  0403 04/26/17  0604   WBC 13.90* 18.36*   HGB 9.1* 9.6*   HCT 28.1* 28.8*   * 188     CMP:     Recent Labs  Lab 04/25/17  0403 04/26/17  0604    140   K 3.8 4.1    104   CO2 26 23   GLU 94 162*   BUN 6 12   CREATININE 0.7 0.7   CALCIUM 8.9 8.9   PROT 6.9 7.5   ALBUMIN 3.0* 3.3*   BILITOT 0.3 0.4   ALKPHOS 89 98   AST 23 24   ALT 21 25   ANIONGAP 8 13   EGFRNONAA >60.0 >60.0       Significant Imaging:   CXR- no acute process    Assessment/Plan:        # Sickle Cell pain crisis  # opioid dependence  - pt having complaints typical of her pain crisis.  - MRI of the right HIP did not reveal any  evidence of avascular necrosis  - Dilaudid 3 gm q 3 hrs, will titrate back to q4  - Seen by Hematology appreciate recs; pain currently well controlled     # Asthma exacerbation  # Acute respiratory failure with hypoxia  - improving   - titrating solumedrol to 80 mg IV TID, another dose of magnesium IV  - will start advair; needs pulmonology follow up  - will also add mucinex      # Sepsis due to suspected Strep PNA  - On Avelox  - seen by ID   - continue oxygen as needed. Will continue to monitor.   - added mucinex for cough suppresant  - acapella every 4 hrs PRN          #Bronchitis  Duonebs PRN  Start Prednisone 50mg daily  Advair      # Benign Essential HTN  - continue home Norvasc  - uncontrolled; adding coreg 12.5 mg PO BID today  - would add ace if BP stays elevated after adequate pain control      # RLE swelling and pain  - BLE ultrasound negative for DVT  MRI of the Hip revealed periostitis, negative for Avascular necrosis     # Morbid Obesity  Body mass index is 54.87 kg/(m^2).     PT/OT         DVT/GI ppx  - Hep SQ  - regular diet          Discharge Disposition- possibly thursday      VTE Risk Mitigation         Ordered     heparin (porcine) injection 5,000 Units  Every 8 hours     Route:  Subcutaneous        04/16/17 0813     Medium Risk of VTE  Once      04/16/17 0813     Place sequential compression device  Until discontinued      04/16/17 0813     Place JOSE LUIS hose  Until discontinued      04/16/17 0813          Raymond Palacio MD  Department of Hospital Medicine   [facility]

## 2017-04-26 NOTE — PT/OT/SLP EVAL
"Physical Therapy  Evaluation and DC    Nazanin Malone   MRN: [id number]   Admitting Diagnosis: Sickle-cell disease with pain    PT Received On: 17  PT Start Time: 1404     PT Stop Time: 1425    PT Total Time (min): 21 min       Billable Minutes:  Evaluation 21    Diagnosis: Sickle-cell disease with pain  Impaired cardiorespiratory response to activity    Past Medical History:   Diagnosis Date    Hypertension     Sickle cell-beta thalassemia disease with pain     Trigeminal neuralgia       Past Surgical History:   Procedure Laterality Date     SECTION      TUBAL LIGATION         Referring physician: Raymond Palacio MD  Date referred to PT: 2017    General Precautions: Standard, fall  Orthopedic Precautions: N/A   Braces: N/A       Do you have any cultural, spiritual, Confucianism conflicts, given your current situation?: none given    Patient History:  Lives With: parent(s), child(jayce), adult (lives with mom, two daughters and two dogs)  Living Arrangements: house  Transportation Available: family or friend will provide  Living Environment Comment: lives in housewithmom, two daughters and two dogs. Pt active prior to admit.  Equipment Currently Used at Home: none  DME owned (not currently used): none    Previous Level of Function:  Ambulation Skills: independent  Transfer Skills: independent  ADL Skills: independent  Work/Leisure Activity: independent    Subjective:  Communicated with nursing, Rachel prior to session as well as reported oxygen response to ambulation on room air. Pt 94% on 3L sitting EOB. Ambulation 1 min 45 sec dropped to 86% Pt did not return to acceptable oxygen level until back on supplemental oxygen in room and took ~15-20 sec to return to 94%  "I feel much better after the pain crisis but I get SOB when I try to do things with the oxygen off.    Chief Complaint: SOBOE  Patient goals: return PLOF, return home                         Objective:   Patient found with: oxygen, " telemetry, peripheral IV     Cognitive Exam:  Oriented to: Person, Place, Time and Situation    Follows Commands/attention: Follows multistep  commands  Communication: clear/fluent  Safety awareness/insight to disability: intact    Physical Exam:  Postural examination/scapula alignment: No postural abnormalities identified    Skin integrity: Visible skin intact  Edema: None noted ALISHA LE    Sensation:   Intact    Upper Extremity Range of Motion:  Right Upper Extremity: WFL  Left Upper Extremity: WFL    Upper Extremity Strength:  Right Upper Extremity: WFL  Left Upper Extremity: WFL    Lower Extremity Range of Motion:  Right Lower Extremity: WFL  Left Lower Extremity: WFL    Lower Extremity Strength:  Right Lower Extremity: WFL  Left Lower Extremity: WFL     Fine motor coordination:  Intact    Gross motor coordination: WFL    Functional Mobility:  Bed Mobility:  Rolling/Turning to Left: Independent  Rolling/Turning Right: Independent  Scooting/Bridging: Independent  Supine to Sit: Independent  Sit to Supine: Independent    Transfers:  Sit <> Stand Assistance: Supervision    Gait:   Gait Distance: 150' with supervison PT pushng IV pole. trials ambulation (6 min walk) for oxygen use assessment.  Assistance 1: Supervision  Gait Assistive Device: No device  Gait Pattern: reciprocal      Balance:   Static Sit: NORMAL: No deviations seen in posture held statically  Dynamic Sit: NORMAL: No deviations seen in posture held dynamically  Static Stand: NORMAL: No deviations seen in posture held statically  Dynamic stand: GOOD: Needs SUPERVISION only during gait and able to self right with moderate     Therapeutic Activities and Exercises:  Evaluation, gait trial on room air. Education on POC, role of PT, safety in room, use oxygen until successfully weaned. Issued extension for oxygen to allow pt to walk to bathroom while using oxygen for safety.    AM-PAC 6 CLICK MOBILITY  How much help from another person does this patient  currently need?   1 = Unable, Total/Dependent Assistance  2 = A lot, Maximum/Moderate Assistance  3 = A little, Minimum/Contact Guard/Supervision  4 = None, Modified [place]/Independent    Turning over in bed (including adjusting bedclothes, sheets and blankets)?: 4  Sitting down on and standing up from a chair with arms (e.g., wheelchair, bedside commode, etc.): 4  Moving from lying on back to sitting on the side of the bed?: 4  Moving to and from a bed to a chair (including a wheelchair)?: 4  Need to walk in hospital room?: 4  Climbing 3-5 steps with a railing?: 3  Total Score: 23     AM-PAC Raw Score CMS G-Code Modifier Level of Impairment Assistance   6 % Total / Unable   7 - 9 CM 80 - 100% Maximal Assist   10 - 14 CL 60 - 80% Moderate Assist   15 - 19 CK 40 - 60% Moderate Assist   20 - 22 CJ 20 - 40% Minimal Assist   23 CI 1-20% SBA / CGA   24 CH 0% Independent/ Mod I     Patient left supine with all lines intact, call button in reach and nursing notified.    Assessment:   Nazanin Malone is a 37 y.o. female with a medical diagnosis of Sickle-cell disease with pain and presents with impaired cardiopulmonary response to activity.    Rehab identified problem list/impairments: Rehab identified problem list/impairments: impaired cardiopulmonary response to activity. Pt does not require skilled physical therapy at this time. Pt safe to ambulate independently with no Ad    Rehab potential is excellent.    Activity tolerance: Fair due to de-saturation on room air    Discharge recommendations: Discharge Facility/Level Of Care Needs: home     Barriers to discharge: Barriers to Discharge: None    Equipment recommendations: Equipment Needed After Discharge:  (Pt unable to walk for < 2 min prior to de-saturation to 86% on room air. Pt returned to 94 on 3L seated in room)     GOALS: DC PT  Physical Therapy Goals     Not on file          PLAN:  DC PT  Plan of Care reviewed with: rere TURNER  Kenneth, PT  04/26/2017

## 2017-04-26 NOTE — SUBJECTIVE & OBJECTIVE
Interval History: patient is feeling much better today; wheezing has stopped and SOB has improved; will scale back on her pain medications and her solumedrol today     Review of Systems   Constitutional: Negative for chills and fever.   HENT: Negative for rhinorrhea and sore throat.    Eyes: Negative for pain and discharge.   Respiratory: Positive for cough and shortness of breath. Negative for wheezing.    Cardiovascular: Negative for chest pain and leg swelling.   Gastrointestinal: Negative for abdominal distention, abdominal pain, blood in stool, nausea and vomiting.   Endocrine: Negative for cold intolerance and heat intolerance.   Genitourinary: Negative for dysuria and flank pain.   Neurological: Negative for dizziness and numbness.   Psychiatric/Behavioral: Negative for behavioral problems and confusion.     Objective:     Vital Signs (Most Recent):  Temp: 98.7 °F (37.1 °C) (04/26/17 1100)  Pulse: 85 (04/26/17 1243)  Resp: 17 (04/26/17 1243)  BP: 138/88 (04/26/17 1100)  SpO2: 97 % (04/26/17 1243) Vital Signs (24h Range):  Temp:  [97.8 °F (36.6 °C)-99.1 °F (37.3 °C)] 98.7 °F (37.1 °C)  Pulse:  [] 85  Resp:  [16-20] 17  SpO2:  [93 %-99 %] 97 %  BP: (138-191)/() 138/88     Weight: 136.1 kg (300 lb)  Body mass index is 54.87 kg/(m^2).  No intake or output data in the 24 hours ending 04/26/17 1416   Physical Exam   Constitutional: She is oriented to person, place, and time. She appears well-developed and well-nourished.   HENT:   Head: Normocephalic and atraumatic.   Eyes: Conjunctivae are normal. Pupils are equal, round, and reactive to light.   Neck: Normal range of motion. No thyromegaly present.   Cardiovascular: Normal rate, regular rhythm and normal heart sounds.    Pulmonary/Chest: No respiratory distress. She has no wheezes.   Abdominal: Soft. She exhibits no distension. There is no tenderness.   Musculoskeletal: Normal range of motion. She exhibits no edema.   Neurological: She is alert and  oriented to person, place, and time.   Skin: No erythema. No pallor.       Significant Labs:   CBC:     Recent Labs  Lab 04/25/17  0403 04/26/17  0604   WBC 13.90* 18.36*   HGB 9.1* 9.6*   HCT 28.1* 28.8*   * 188     CMP:     Recent Labs  Lab 04/25/17  0403 04/26/17  0604    140   K 3.8 4.1    104   CO2 26 23   GLU 94 162*   BUN 6 12   CREATININE 0.7 0.7   CALCIUM 8.9 8.9   PROT 6.9 7.5   ALBUMIN 3.0* 3.3*   BILITOT 0.3 0.4   ALKPHOS 89 98   AST 23 24   ALT 21 25   ANIONGAP 8 13   EGFRNONAA >60.0 >60.0       Significant Imaging:   CXR- no acute process

## 2017-04-26 NOTE — SUBJECTIVE & OBJECTIVE
Interval History: patient states she is having SOB while walking and has been having audible expiratory wheezes  - switching patient from prednisone to solumedrol;     Review of Systems   Constitutional: Negative for chills and fever.   HENT: Negative for rhinorrhea and sore throat.    Eyes: Negative for pain and discharge.   Respiratory: Positive for cough, shortness of breath and wheezing.    Cardiovascular: Negative for chest pain and leg swelling.   Gastrointestinal: Negative for abdominal distention, abdominal pain, blood in stool, nausea and vomiting.   Endocrine: Negative for cold intolerance and heat intolerance.   Genitourinary: Negative for dysuria and flank pain.   Neurological: Negative for dizziness and numbness.   Psychiatric/Behavioral: Negative for behavioral problems and confusion.     Objective:     Vital Signs (Most Recent):  Temp: 99.1 °F (37.3 °C) (04/25/17 2034)  Pulse: 105 (04/25/17 2034)  Resp: 18 (04/25/17 2034)  BP: (!) 191/107 (04/25/17 2034)  SpO2: 97 % (04/25/17 2034) Vital Signs (24h Range):  Temp:  [97.8 °F (36.6 °C)-99.1 °F (37.3 °C)] 99.1 °F (37.3 °C)  Pulse:  [] 105  Resp:  [17-25] 18  SpO2:  [93 %-97 %] 97 %  BP: (134-191)/() 191/107     Weight: 136.1 kg (300 lb)  Body mass index is 54.87 kg/(m^2).    Intake/Output Summary (Last 24 hours) at 04/25/17 2050  Last data filed at 04/24/17 2300   Gross per 24 hour   Intake              600 ml   Output                0 ml   Net              600 ml      Physical Exam   Constitutional: She is oriented to person, place, and time. She appears well-developed and well-nourished.   HENT:   Head: Normocephalic and atraumatic.   Eyes: Conjunctivae are normal. Pupils are equal, round, and reactive to light.   Neck: Normal range of motion. No thyromegaly present.   Cardiovascular: Normal rate, regular rhythm and normal heart sounds.    Pulmonary/Chest: She is in respiratory distress. She has wheezes.   Abdominal: Soft. She exhibits no  distension. There is no tenderness.   Musculoskeletal: Normal range of motion. She exhibits no edema.   Neurological: She is alert and oriented to person, place, and time.   Skin: No erythema. No pallor.       Significant Labs:   CBC:   Recent Labs  Lab 04/24/17 0526 04/25/17  0403   WBC 15.26* 13.90*   HGB 9.4* 9.1*   HCT 29.7* 28.1*   * 139*     CMP:   Recent Labs  Lab 04/24/17 0526 04/25/17  0403    140   K 4.4 3.8    106   CO2 32* 26   * 94   BUN 9 6   CREATININE 0.7 0.7   CALCIUM 9.2 8.9   PROT 7.3 6.9   ALBUMIN 3.1* 3.0*   BILITOT 0.4 0.3   ALKPHOS 99 89   AST 26 23   ALT 24 21   ANIONGAP 5* 8   EGFRNONAA >60.0 >60.0       Significant Imaging:   CXR- no acute process

## 2017-04-26 NOTE — SUBJECTIVE & OBJECTIVE
Interval History: Feels improved today. Did get SOB with walking      Review of Systems   Constitutional: Positive for activity change and fever. Negative for appetite change.   HENT: Positive for congestion and sinus pressure. Negative for ear pain, facial swelling and hearing loss.    Eyes: Negative.    Respiratory: Positive for cough and shortness of breath. Negative for apnea, choking and chest tightness.    Cardiovascular: Negative.    Gastrointestinal: Negative.    Musculoskeletal:        Leg pain     Objective:     Vital Signs (Most Recent):  Temp: 98.7 °F (37.1 °C) (04/26/17 0905)  Pulse: 87 (04/26/17 0905)  Resp: 18 (04/26/17 0905)  BP: (!) 159/94 (04/26/17 0905)  SpO2: 97 % (04/26/17 0905) Vital Signs (24h Range):  Temp:  [97.8 °F (36.6 °C)-99.1 °F (37.3 °C)] 98.7 °F (37.1 °C)  Pulse:  [] 87  Resp:  [16-20] 18  SpO2:  [93 %-99 %] 97 %  BP: (150-191)/() 159/94     Weight: 136.1 kg (300 lb)  Body mass index is 54.87 kg/(m^2).    Estimated Creatinine Clearance: 143.9 mL/min (based on Cr of 0.7).    Physical Exam   Constitutional: She is oriented to person, place, and time. She appears well-developed and well-nourished.   obese   HENT:   Head: Normocephalic and atraumatic.   Eyes: Conjunctivae and EOM are normal.   Neck: Normal range of motion.   Cardiovascular: Normal rate and regular rhythm.    Pulmonary/Chest: Effort normal. She has wheezes.   Abdominal: Soft.   Musculoskeletal: She exhibits tenderness (leg).   Neurological: She is alert and oriented to person, place, and time.       Significant Labs: All pertinent labs within the past 24 hours have been reviewed.    Significant Imaging: I have reviewed all pertinent imaging results/findings within the past 24 hours.

## 2017-04-26 NOTE — PROGRESS NOTES
[facility]  Infectious Disease  Progress Note    Patient Name: Nazanin Malone  MRN: [id number]  Admission Date: 4/16/2017  Length of Stay: 10 days  Attending Physician: Raymond Palacio MD  Primary Care Provider: Primary Doctor No    Isolation Status: No active isolations  Assessment/Plan:      * Sickle-cell disease with pain  38 yo female who has a PMH of Hypertension, Sickle cell-beta thalassemia disease with pain, and Trigeminal neuralgia who is admitted for sickle pain crisis. UA not concerning for infection. No AVN on hip.    -no clear source of infection, no bands. URI vs bronchitis most likley  -continue moxi until 4/27 (this will be ten full days of antibiotics)  -per patient she uses her rescue inhaler 3-4 times daily so may need better control  -note that increase in WBC likely related to steroids (given lack of elevated bands)      Thank you for your consult. I will sign off. Please contact us if you have any additional questions.    Keyur Gonzalez MD  Infectious Disease  [facility]    Subjective:     Principal Problem:Sickle-cell disease with pain    HPI: See consult for HPI  Interval History: Feels improved today. Did get SOB with walking      Review of Systems   Constitutional: Positive for activity change and fever. Negative for appetite change.   HENT: Positive for congestion and sinus pressure. Negative for ear pain, facial swelling and hearing loss.    Eyes: Negative.    Respiratory: Positive for cough and shortness of breath. Negative for apnea, choking and chest tightness.    Cardiovascular: Negative.    Gastrointestinal: Negative.    Musculoskeletal:        Leg pain     Objective:     Vital Signs (Most Recent):  Temp: 98.7 °F (37.1 °C) (04/26/17 0905)  Pulse: 87 (04/26/17 0905)  Resp: 18 (04/26/17 0905)  BP: (!) 159/94 (04/26/17 0905)  SpO2: 97 % (04/26/17 0905) Vital Signs (24h Range):  Temp:  [97.8 °F (36.6 °C)-99.1 °F (37.3 °C)] 98.7 °F (37.1 °C)  Pulse:   [] 87  Resp:  [16-20] 18  SpO2:  [93 %-99 %] 97 %  BP: (150-191)/() 159/94     Weight: 136.1 kg (300 lb)  Body mass index is 54.87 kg/(m^2).    Estimated Creatinine Clearance: 143.9 mL/min (based on Cr of 0.7).    Physical Exam   Constitutional: She is oriented to person, place, and time. She appears well-developed and well-nourished.   obese   HENT:   Head: Normocephalic and atraumatic.   Eyes: Conjunctivae and EOM are normal.   Neck: Normal range of motion.   Cardiovascular: Normal rate and regular rhythm.    Pulmonary/Chest: Effort normal. She has wheezes.   Abdominal: Soft.   Musculoskeletal: She exhibits tenderness (leg).   Neurological: She is alert and oriented to person, place, and time.       Significant Labs: All pertinent labs within the past 24 hours have been reviewed.    Significant Imaging: I have reviewed all pertinent imaging results/findings within the past 24 hours.

## 2017-04-26 NOTE — PROGRESS NOTES
"[facility] Hospital Medicine  Progress Note    Patient Name: Nazanin Malone  MRN: [id number]  Patient Class: IP- Inpatient   Admission Date: 4/16/2017  Length of Stay: 9 days  Attending Physician: Raymond Palacio MD  Primary Care Provider: Primary Doctor [facility] Medicine Team: [facility] HOSP MED A Raymond Palacio MD    Subjective:     Principal Problem:Sickle-cell disease with pain    HPI:  Nazanin Malone is a 37 y.o. female who  has a PMH of Hypertension; Sickle cell-beta thalassemia disease with pain; and Trigeminal neuralgia. The patient presented to [facility] on 4/16/2017 due to worsening right leg pain over the last week as well as a cough productive of green mucous. She states she came to ED 2 days ago for her cough and was prescribed Augmentin. She says the mucous is no longer green, but she still feels all "stuffed up and congested." She has had to be admitted a few other times due to sickle pain crisis, but rarely needs transfusions. Persistently asking for more pain medications,     Hospital Course:  4/25- audible wheezing; afebrile; switched to solumedrol    Interval History: patient states she is having SOB while walking and has been having audible expiratory wheezes  - switching patient from prednisone to solumedrol;     Review of Systems   Constitutional: Negative for chills and fever.   HENT: Negative for rhinorrhea and sore throat.    Eyes: Negative for pain and discharge.   Respiratory: Positive for cough, shortness of breath and wheezing.    Cardiovascular: Negative for chest pain and leg swelling.   Gastrointestinal: Negative for abdominal distention, abdominal pain, blood in stool, nausea and vomiting.   Endocrine: Negative for cold intolerance and heat intolerance.   Genitourinary: Negative for dysuria and flank pain.   Neurological: Negative for dizziness and numbness.   Psychiatric/Behavioral: Negative for behavioral problems and confusion.     Objective: "     Vital Signs (Most Recent):  Temp: 99.1 °F (37.3 °C) (04/25/17 2034)  Pulse: 105 (04/25/17 2034)  Resp: 18 (04/25/17 2034)  BP: (!) 191/107 (04/25/17 2034)  SpO2: 97 % (04/25/17 2034) Vital Signs (24h Range):  Temp:  [97.8 °F (36.6 °C)-99.1 °F (37.3 °C)] 99.1 °F (37.3 °C)  Pulse:  [] 105  Resp:  [17-25] 18  SpO2:  [93 %-97 %] 97 %  BP: (134-191)/() 191/107     Weight: 136.1 kg (300 lb)  Body mass index is 54.87 kg/(m^2).    Intake/Output Summary (Last 24 hours) at 04/25/17 2050  Last data filed at 04/24/17 2300   Gross per 24 hour   Intake              600 ml   Output                0 ml   Net              600 ml      Physical Exam   Constitutional: She is oriented to person, place, and time. She appears well-developed and well-nourished.   HENT:   Head: Normocephalic and atraumatic.   Eyes: Conjunctivae are normal. Pupils are equal, round, and reactive to light.   Neck: Normal range of motion. No thyromegaly present.   Cardiovascular: Normal rate, regular rhythm and normal heart sounds.    Pulmonary/Chest: She is in respiratory distress. She has wheezes.   Abdominal: Soft. She exhibits no distension. There is no tenderness.   Musculoskeletal: Normal range of motion. She exhibits no edema.   Neurological: She is alert and oriented to person, place, and time.   Skin: No erythema. No pallor.       Significant Labs:   CBC:   Recent Labs  Lab 04/24/17 0526 04/25/17  0403   WBC 15.26* 13.90*   HGB 9.4* 9.1*   HCT 29.7* 28.1*   * 139*     CMP:   Recent Labs  Lab 04/24/17 0526 04/25/17  0403    140   K 4.4 3.8    106   CO2 32* 26   * 94   BUN 9 6   CREATININE 0.7 0.7   CALCIUM 9.2 8.9   PROT 7.3 6.9   ALBUMIN 3.1* 3.0*   BILITOT 0.4 0.3   ALKPHOS 99 89   AST 26 23   ALT 24 21   ANIONGAP 5* 8   EGFRNONAA >60.0 >60.0       Significant Imaging:   CXR- no acute process    Assessment/Plan:        # Sickle Cell pain crisis  # opioid dependence  - pt having complaints typical of her pain  crisis.  - MRI of the right HIP did not reveal any evidence of avascular necrosis  - Dilaudid 3 gm q 3 hrs  - Seen by Hematology appreciate recs; pain currently well controlled    # Asthma exacerbation  # Acute respiratory failure with hypoxia  - having audible wheezes on oxygen  - will switch to solumedrol 125 mg IV q6 and duo nebs; giving magnesium as well  - will start advair; needs pulmonology follow up  - will also add mucinex      # Sepsis due to suspected Strep PNA  - On Avelox  - seen by ID   - continue oxygen as needed. Will continue to monitor.   - added mucinex for cough suppresant  - acapella every 4 hrs PRN         #Bronchitis  Duonebs PRN  Start Prednisone 50mg daily  Advair     # Benign Essential HTN  - continue home Norvasc  - uncontrolled; adding coreg 12.5 mg PO BID today  - would add ace if BP stays elevated after adequate pain control      # RLE swelling and pain  - BLE ultrasound negative for DVT  MRI of the Hip revealed periostitis, negative for Avascular necrosis    # Morbid Obesity  Body mass index is 54.87 kg/(m^2).   PT/OT       DVT/GI ppx  - Hep SQ  - regular diet        Discharge Disposition- possibly thursday      VTE Risk Mitigation         Ordered     heparin (porcine) injection 5,000 Units  Every 8 hours     Route:  Subcutaneous        04/16/17 0813     Medium Risk of VTE  Once      04/16/17 0813     Place sequential compression device  Until discontinued      04/16/17 0813     Place JOSE LUIS hose  Until discontinued      04/16/17 0813          Raymond Palacio MD  Department of Hospital Medicine   [facility]

## 2017-04-26 NOTE — PROGRESS NOTES
[facility]  Adult Nutrition  Progress Note    SUMMARY     Recommendations    Recommendation/Intervention: Pt with varying appetite. Today her appetite was great, she ate 100%, however meal intake on 4/23 was 25% per chart review. Pt reports her appetite decreases when she's in pain. Her intake also varies d/t personal stress. Pt expressed interest in making dietary changes, discussed  portion control and healthy eating habits. Denies n/v or chewing/swallowing difficulty. No family present at time of visit. .     1. Continue current diet order.     RD to monitor and follow up.     Goals: PO intake >/= 75%  Nutrition Goal Status: new  Communication of RD Recs: reviewed with RN    Continuum of Care Plan    Nursing Team: obtain weekly wts.     Reason for Assessment    Reason for Assessment: length of stay  Diagnosis:  (sickle cell disease with pain )  Relevent Medical History: HTN, trigeminal neuralgia   Interdisciplinary Rounds: did not attend  Nutrition Discharge Planning: Adequate PO intake to meet daily needs.     Nutrition Prescription Ordered    Current Diet Order: Adult regular        Nutrition Risk Screen     Nutrition Risk Screen: no indicators present    Nutrition/Diet History    Patient Reported Diet/Restrictions/Preferences: general  Food Preferences: no cultural/Anabaptism limitaitons reported  Factors Affecting Nutritional Intake: pain     Labs/Tests/Procedures/Meds    Pertinent Labs Reviewed: reviewed  Pertinent Labs Comments:   Pertinent Medications Reviewed: reviewed  Pertinent Medications Comments: heparin, IVF, senna    Physical Findings    Overall Physical Appearance: obese  Oral/Mouth Cavity: WDL  Skin: intact    Anthropometrics    Height (inches): 62.01 in  Weight Method: Stated  Weight (kg): (!) 136.1 kg  Ideal Body Weight (IBW), Female: 110.05 lb  % Ideal Body Weight, Female (lb): 272.65 lb  BMI (kg/m2): 54.87  BMI Grade: greater than 40 - morbid obesity  Usual Body  Weight (UBW), kg:  (Pt reports UBW is 250# however she's been current wt for >6m)      Estimated/Assessed Needs    Weight Used For Calorie Calculations: (!) 136.1 kg (300 lb 0.7 oz)   Height (cm): 157.5 cm  Energy Need Method: [place]-St Jeor (2000 kcal/day)  Indirect Calorimetry:                       RMR (R[place]-St. Jeor Equation): 1991.13  Weight Used For Protein Calculations: 49.9 kg (110 lb) (1.5-2.0 gm/kg of IBW )  Protein Requirements:  gm/day  Fluid Need Method: RDA Method (2000 mL/day or per MD)     Nutrition Diagnosis    Altered nutrition related lab values r/t physiological causes AEB .    Monitor and Evaluation    Food and Nutrient Intake: food and beverage intake  Food and Nutrient Adminstration: diet order  Anthropometric Measurements: weight change  Biochemical Data, Medical Tests and Procedures: electrolyte and renal panel, glucose/endocrine profile  Nutrition-Focused Physical Findings: overall appearance    Nutrition Risk    Level of Risk:  (f/u 1x/week)    Nutrition Follow-Up    RD Follow-up?: Yes     Angie Lu, RD, LDN

## 2017-04-26 NOTE — ASSESSMENT & PLAN NOTE
36 yo female who has a PMH of Hypertension, Sickle cell-beta thalassemia disease with pain, and Trigeminal neuralgia who is admitted for sickle pain crisis. UA not concerning for infection. No AVN on hip.    -no clear source of infection, no bands. URI vs bronchitis most misael  -continue moxi until 4/27 (this will be ten full days of antibiotics)  -per patient she uses her rescue inhaler 3-4 times daily so may need better control  -note that increase in WBC likely related to steroids (given lack of elevated bands)

## 2017-04-27 LAB
ALBUMIN SERPL BCP-MCNC: 3.4 G/DL
ALP SERPL-CCNC: 100 U/L
ALT SERPL W/O P-5'-P-CCNC: 30 U/L
ANION GAP SERPL CALC-SCNC: 8 MMOL/L
ANISOCYTOSIS BLD QL SMEAR: SLIGHT
AST SERPL-CCNC: 24 U/L
BASOPHILS # BLD AUTO: ABNORMAL K/UL
BASOPHILS NFR BLD: 0 %
BILIRUB SERPL-MCNC: 0.4 MG/DL
BUN SERPL-MCNC: 14 MG/DL
CALCIUM SERPL-MCNC: 8.9 MG/DL
CHLORIDE SERPL-SCNC: 105 MMOL/L
CO2 SERPL-SCNC: 27 MMOL/L
CREAT SERPL-MCNC: 0.8 MG/DL
DIFFERENTIAL METHOD: ABNORMAL
EOSINOPHIL # BLD AUTO: ABNORMAL K/UL
EOSINOPHIL NFR BLD: 0 %
ERYTHROCYTE [DISTWIDTH] IN BLOOD BY AUTOMATED COUNT: 20.5 %
EST. GFR  (AFRICAN AMERICAN): >60 ML/MIN/1.73 M^2
EST. GFR  (NON AFRICAN AMERICAN): >60 ML/MIN/1.73 M^2
GLUCOSE SERPL-MCNC: 125 MG/DL
HCT VFR BLD AUTO: 28.9 %
HGB BLD-MCNC: 9.7 G/DL
HYPOCHROMIA BLD QL SMEAR: ABNORMAL
LYMPHOCYTES # BLD AUTO: ABNORMAL K/UL
LYMPHOCYTES NFR BLD: 6 %
MAGNESIUM SERPL-MCNC: 2.8 MG/DL
MCH RBC QN AUTO: 23.2 PG
MCHC RBC AUTO-ENTMCNC: 33.6 %
MCV RBC AUTO: 69 FL
METAMYELOCYTES NFR BLD MANUAL: 1 %
MONOCYTES # BLD AUTO: ABNORMAL K/UL
MONOCYTES NFR BLD: 1 %
NEUTROPHILS NFR BLD: 92 %
NRBC BLD-RTO: ABNORMAL /100 WBC
OVALOCYTES BLD QL SMEAR: ABNORMAL
PHOSPHATE SERPL-MCNC: 3.6 MG/DL
PLATELET # BLD AUTO: 223 K/UL
PLATELET BLD QL SMEAR: ABNORMAL
PMV BLD AUTO: 9 FL
POIKILOCYTOSIS BLD QL SMEAR: SLIGHT
POLYCHROMASIA BLD QL SMEAR: ABNORMAL
POTASSIUM SERPL-SCNC: 4.4 MMOL/L
PROT SERPL-MCNC: 7.5 G/DL
RBC # BLD AUTO: 4.18 M/UL
RETICS/RBC NFR AUTO: 5.8 %
SODIUM SERPL-SCNC: 140 MMOL/L
TARGETS BLD QL SMEAR: ABNORMAL
WBC # BLD AUTO: 22.97 K/UL
WBC NRBC COR # BLD: 15.73 K/UL

## 2017-04-27 PROCEDURE — 25000003 PHARM REV CODE 250: Performed by: INTERNAL MEDICINE

## 2017-04-27 PROCEDURE — 94640 AIRWAY INHALATION TREATMENT: CPT

## 2017-04-27 PROCEDURE — 25000003 PHARM REV CODE 250: Performed by: HOSPITALIST

## 2017-04-27 PROCEDURE — 63600175 PHARM REV CODE 636 W HCPCS: Performed by: HOSPITALIST

## 2017-04-27 PROCEDURE — 83735 ASSAY OF MAGNESIUM: CPT

## 2017-04-27 PROCEDURE — 25000242 PHARM REV CODE 250 ALT 637 W/ HCPCS: Performed by: NURSE PRACTITIONER

## 2017-04-27 PROCEDURE — 27000221 HC OXYGEN, UP TO 24 HOURS

## 2017-04-27 PROCEDURE — 36415 COLL VENOUS BLD VENIPUNCTURE: CPT

## 2017-04-27 PROCEDURE — 99233 SBSQ HOSP IP/OBS HIGH 50: CPT | Mod: ,,, | Performed by: HOSPITALIST

## 2017-04-27 PROCEDURE — 80053 COMPREHEN METABOLIC PANEL: CPT

## 2017-04-27 PROCEDURE — 25000003 PHARM REV CODE 250: Performed by: PHYSICIAN ASSISTANT

## 2017-04-27 PROCEDURE — 85027 COMPLETE CBC AUTOMATED: CPT

## 2017-04-27 PROCEDURE — 11000001 HC ACUTE MED/SURG PRIVATE ROOM

## 2017-04-27 PROCEDURE — 84100 ASSAY OF PHOSPHORUS: CPT

## 2017-04-27 PROCEDURE — 85045 AUTOMATED RETICULOCYTE COUNT: CPT

## 2017-04-27 PROCEDURE — 85007 BL SMEAR W/DIFF WBC COUNT: CPT

## 2017-04-27 RX ORDER — CARVEDILOL 25 MG/1
25 TABLET ORAL 2 TIMES DAILY
Status: DISCONTINUED | OUTPATIENT
Start: 2017-04-27 | End: 2017-05-05 | Stop reason: HOSPADM

## 2017-04-27 RX ORDER — CARVEDILOL 12.5 MG/1
12.5 TABLET ORAL ONCE
Status: COMPLETED | OUTPATIENT
Start: 2017-04-27 | End: 2017-04-27

## 2017-04-27 RX ADMIN — HEPARIN SODIUM 5000 UNITS: 5000 INJECTION, SOLUTION INTRAVENOUS; SUBCUTANEOUS at 05:04

## 2017-04-27 RX ADMIN — METHYLPREDNISOLONE SODIUM SUCCINATE 80 MG: 125 INJECTION, POWDER, FOR SOLUTION INTRAMUSCULAR; INTRAVENOUS at 05:04

## 2017-04-27 RX ADMIN — CETIRIZINE HYDROCHLORIDE 5 MG: 5 TABLET, FILM COATED ORAL at 09:04

## 2017-04-27 RX ADMIN — SODIUM CHLORIDE: 0.9 INJECTION, SOLUTION INTRAVENOUS at 10:04

## 2017-04-27 RX ADMIN — OXYCODONE HYDROCHLORIDE 10 MG: 5 TABLET ORAL at 12:04

## 2017-04-27 RX ADMIN — IPRATROPIUM BROMIDE AND ALBUTEROL SULFATE 3 ML: .5; 3 SOLUTION RESPIRATORY (INHALATION) at 07:04

## 2017-04-27 RX ADMIN — HYDROMORPHONE HYDROCHLORIDE 3 MG: 1 INJECTION, SOLUTION INTRAMUSCULAR; INTRAVENOUS; SUBCUTANEOUS at 06:04

## 2017-04-27 RX ADMIN — HYDROMORPHONE HYDROCHLORIDE 3 MG: 1 INJECTION, SOLUTION INTRAMUSCULAR; INTRAVENOUS; SUBCUTANEOUS at 04:04

## 2017-04-27 RX ADMIN — PROMETHAZINE HYDROCHLORIDE AND CODEINE PHOSPHATE 5 ML: 6.25; 1 SYRUP ORAL at 12:04

## 2017-04-27 RX ADMIN — PROMETHAZINE HYDROCHLORIDE AND CODEINE PHOSPHATE 5 ML: 6.25; 1 SYRUP ORAL at 08:04

## 2017-04-27 RX ADMIN — HYDROMORPHONE HYDROCHLORIDE 3 MG: 1 INJECTION, SOLUTION INTRAMUSCULAR; INTRAVENOUS; SUBCUTANEOUS at 10:04

## 2017-04-27 RX ADMIN — Medication 3 ML: at 05:04

## 2017-04-27 RX ADMIN — IPRATROPIUM BROMIDE AND ALBUTEROL SULFATE 3 ML: .5; 3 SOLUTION RESPIRATORY (INHALATION) at 04:04

## 2017-04-27 RX ADMIN — IBUPROFEN 400 MG: 400 TABLET, FILM COATED ORAL at 08:04

## 2017-04-27 RX ADMIN — CARVEDILOL 12.5 MG: 12.5 TABLET, FILM COATED ORAL at 09:04

## 2017-04-27 RX ADMIN — BENZONATATE 100 MG: 100 CAPSULE ORAL at 06:04

## 2017-04-27 RX ADMIN — IPRATROPIUM BROMIDE AND ALBUTEROL SULFATE 3 ML: .5; 3 SOLUTION RESPIRATORY (INHALATION) at 11:04

## 2017-04-27 RX ADMIN — PANTOPRAZOLE SODIUM 600 MG: 40 TABLET, DELAYED RELEASE ORAL at 08:04

## 2017-04-27 RX ADMIN — Medication 3 ML: at 02:04

## 2017-04-27 RX ADMIN — AMLODIPINE BESYLATE 10 MG: 10 TABLET ORAL at 09:04

## 2017-04-27 RX ADMIN — GABAPENTIN 600 MG: 300 CAPSULE ORAL at 08:04

## 2017-04-27 RX ADMIN — CARBAMAZEPINE 400 MG: 200 TABLET ORAL at 08:04

## 2017-04-27 RX ADMIN — IPRATROPIUM BROMIDE AND ALBUTEROL SULFATE 3 ML: .5; 3 SOLUTION RESPIRATORY (INHALATION) at 03:04

## 2017-04-27 RX ADMIN — IPRATROPIUM BROMIDE AND ALBUTEROL SULFATE 3 ML: .5; 3 SOLUTION RESPIRATORY (INHALATION) at 06:04

## 2017-04-27 RX ADMIN — CARVEDILOL 12.5 MG: 12.5 TABLET, FILM COATED ORAL at 10:04

## 2017-04-27 RX ADMIN — OXYCODONE HYDROCHLORIDE 10 MG: 5 TABLET ORAL at 03:04

## 2017-04-27 RX ADMIN — GABAPENTIN 600 MG: 300 CAPSULE ORAL at 09:04

## 2017-04-27 RX ADMIN — OXYCODONE HYDROCHLORIDE 10 MG: 5 TABLET ORAL at 05:04

## 2017-04-27 RX ADMIN — HYDROMORPHONE HYDROCHLORIDE 3 MG: 1 INJECTION, SOLUTION INTRAMUSCULAR; INTRAVENOUS; SUBCUTANEOUS at 02:04

## 2017-04-27 RX ADMIN — METHYLPREDNISOLONE SODIUM SUCCINATE 80 MG: 125 INJECTION, POWDER, FOR SOLUTION INTRAMUSCULAR; INTRAVENOUS at 01:04

## 2017-04-27 RX ADMIN — IBUPROFEN 400 MG: 400 TABLET, FILM COATED ORAL at 05:04

## 2017-04-27 RX ADMIN — PANTOPRAZOLE SODIUM 600 MG: 40 TABLET, DELAYED RELEASE ORAL at 09:04

## 2017-04-27 RX ADMIN — STANDARDIZED SENNA CONCENTRATE AND DOCUSATE SODIUM 1 TABLET: 8.6; 5 TABLET, FILM COATED ORAL at 09:04

## 2017-04-27 RX ADMIN — METHYLPREDNISOLONE SODIUM SUCCINATE 80 MG: 125 INJECTION, POWDER, FOR SOLUTION INTRAMUSCULAR; INTRAVENOUS at 08:04

## 2017-04-27 RX ADMIN — HEPARIN SODIUM 5000 UNITS: 5000 INJECTION, SOLUTION INTRAVENOUS; SUBCUTANEOUS at 01:04

## 2017-04-27 RX ADMIN — DIPHENHYDRAMINE HYDROCHLORIDE 25 MG: 25 CAPSULE ORAL at 06:04

## 2017-04-27 RX ADMIN — HYDRALAZINE HYDROCHLORIDE 50 MG: 50 TABLET ORAL at 04:04

## 2017-04-27 RX ADMIN — BACLOFEN 10 MG: 10 TABLET ORAL at 09:04

## 2017-04-27 RX ADMIN — FLUTICASONE FUROATE AND VILANTEROL TRIFENATATE 1 PUFF: 100; 25 POWDER RESPIRATORY (INHALATION) at 09:04

## 2017-04-27 RX ADMIN — PROMETHAZINE HYDROCHLORIDE AND CODEINE PHOSPHATE 5 ML: 6.25; 1 SYRUP ORAL at 06:04

## 2017-04-27 RX ADMIN — CARBAMAZEPINE 400 MG: 200 TABLET ORAL at 09:04

## 2017-04-27 RX ADMIN — IPRATROPIUM BROMIDE AND ALBUTEROL SULFATE 3 ML: .5; 3 SOLUTION RESPIRATORY (INHALATION) at 12:04

## 2017-04-27 RX ADMIN — MOXIFLOXACIN HYDROCHLORIDE 400 MG: 400 TABLET, FILM COATED ORAL at 09:04

## 2017-04-27 RX ADMIN — HYDROMORPHONE HYDROCHLORIDE 3 MG: 1 INJECTION, SOLUTION INTRAMUSCULAR; INTRAVENOUS; SUBCUTANEOUS at 01:04

## 2017-04-27 RX ADMIN — DIPHENHYDRAMINE HYDROCHLORIDE 25 MG: 25 CAPSULE ORAL at 04:04

## 2017-04-27 RX ADMIN — STANDARDIZED SENNA CONCENTRATE AND DOCUSATE SODIUM 1 TABLET: 8.6; 5 TABLET, FILM COATED ORAL at 08:04

## 2017-04-27 RX ADMIN — FLUOXETINE 40 MG: 20 CAPSULE ORAL at 09:04

## 2017-04-27 RX ADMIN — IBUPROFEN 400 MG: 400 TABLET, FILM COATED ORAL at 01:04

## 2017-04-27 RX ADMIN — FLUTICASONE PROPIONATE 2 SPRAY: 50 SPRAY, METERED NASAL at 09:04

## 2017-04-27 RX ADMIN — CARVEDILOL 25 MG: 25 TABLET, FILM COATED ORAL at 08:04

## 2017-04-27 RX ADMIN — BACLOFEN 10 MG: 10 TABLET ORAL at 08:04

## 2017-04-27 RX ADMIN — HYDROMORPHONE HYDROCHLORIDE 3 MG: 1 INJECTION, SOLUTION INTRAMUSCULAR; INTRAVENOUS; SUBCUTANEOUS at 09:04

## 2017-04-27 NOTE — PLAN OF CARE
Problem: Patient Care Overview  Goal: Plan of Care Review  Outcome: Ongoing (interventions implemented as appropriate)  Pt free of any injury or falls. Ambulated in hallway twice throughout night. PRN pain medications given as needed for pain. Pt states pain is unrelieved and she feels as though she is taking a step back because she felt great during the day and now she feels worst. PRN hydralazine given for BP. Will continue to monitor.

## 2017-04-28 LAB
ALBUMIN SERPL BCP-MCNC: 3.3 G/DL
ALP SERPL-CCNC: 104 U/L
ALT SERPL W/O P-5'-P-CCNC: 32 U/L
ANION GAP SERPL CALC-SCNC: 8 MMOL/L
ANISOCYTOSIS BLD QL SMEAR: ABNORMAL
AST SERPL-CCNC: 27 U/L
BASO STIPL BLD QL SMEAR: ABNORMAL
BASOPHILS # BLD AUTO: ABNORMAL K/UL
BASOPHILS NFR BLD: 0 %
BILIRUB SERPL-MCNC: 0.3 MG/DL
BILIRUB UR QL STRIP: NEGATIVE
BUN SERPL-MCNC: 13 MG/DL
CALCIUM SERPL-MCNC: 8.8 MG/DL
CHLORIDE SERPL-SCNC: 105 MMOL/L
CLARITY UR REFRACT.AUTO: CLEAR
CO2 SERPL-SCNC: 25 MMOL/L
COLOR UR AUTO: ABNORMAL
CREAT SERPL-MCNC: 0.8 MG/DL
DACRYOCYTES BLD QL SMEAR: ABNORMAL
DIFFERENTIAL METHOD: ABNORMAL
EOSINOPHIL # BLD AUTO: ABNORMAL K/UL
EOSINOPHIL NFR BLD: 0 %
ERYTHROCYTE [DISTWIDTH] IN BLOOD BY AUTOMATED COUNT: 20.9 %
EST. GFR  (AFRICAN AMERICAN): >60 ML/MIN/1.73 M^2
EST. GFR  (NON AFRICAN AMERICAN): >60 ML/MIN/1.73 M^2
GLUCOSE SERPL-MCNC: 139 MG/DL
GLUCOSE UR QL STRIP: ABNORMAL
HCT VFR BLD AUTO: 30.2 %
HGB BLD-MCNC: 9.5 G/DL
HGB UR QL STRIP: ABNORMAL
HOWELL-JOLLY BOD BLD QL SMEAR: ABNORMAL
HYPOCHROMIA BLD QL SMEAR: ABNORMAL
KETONES UR QL STRIP: NEGATIVE
LACTATE SERPL-SCNC: 1.5 MMOL/L
LEUKOCYTE ESTERASE UR QL STRIP: NEGATIVE
LYMPHOCYTES # BLD AUTO: ABNORMAL K/UL
LYMPHOCYTES NFR BLD: 12 %
MAGNESIUM SERPL-MCNC: 2.4 MG/DL
MCH RBC QN AUTO: 22.4 PG
MCHC RBC AUTO-ENTMCNC: 31.5 %
MCV RBC AUTO: 71 FL
MICROSCOPIC COMMENT: NORMAL
MONOCYTES # BLD AUTO: ABNORMAL K/UL
MONOCYTES NFR BLD: 7 %
NEUTROPHILS NFR BLD: 78 %
NEUTS BAND NFR BLD MANUAL: 3 %
NITRITE UR QL STRIP: NEGATIVE
NRBC BLD-RTO: ABNORMAL /100 WBC
OVALOCYTES BLD QL SMEAR: ABNORMAL
PH UR STRIP: 6 [PH] (ref 5–8)
PHOSPHATE SERPL-MCNC: 3.4 MG/DL
PLATELET # BLD AUTO: 249 K/UL
PLATELET BLD QL SMEAR: ABNORMAL
PMV BLD AUTO: 9.1 FL
POIKILOCYTOSIS BLD QL SMEAR: ABNORMAL
POLYCHROMASIA BLD QL SMEAR: ABNORMAL
POTASSIUM SERPL-SCNC: 4.5 MMOL/L
PROCALCITONIN SERPL IA-MCNC: <0.09 NG/ML
PROT SERPL-MCNC: 7.3 G/DL
PROT UR QL STRIP: NEGATIVE
RBC # BLD AUTO: 4.24 M/UL
RBC #/AREA URNS AUTO: 1 /HPF (ref 0–4)
RETICS/RBC NFR AUTO: 5.7 %
SCHISTOCYTES BLD QL SMEAR: PRESENT
SODIUM SERPL-SCNC: 138 MMOL/L
SP GR UR STRIP: 1.01 (ref 1–1.03)
SQUAMOUS #/AREA URNS AUTO: 0 /HPF
TARGETS BLD QL SMEAR: ABNORMAL
TOXIC GRANULES BLD QL SMEAR: PRESENT
URN SPEC COLLECT METH UR: ABNORMAL
UROBILINOGEN UR STRIP-ACNC: NEGATIVE EU/DL
WBC # BLD AUTO: 26.25 K/UL
WBC #/AREA URNS AUTO: 1 /HPF (ref 0–5)
WBC NRBC COR # BLD: 20.35 K/UL

## 2017-04-28 PROCEDURE — 85045 AUTOMATED RETICULOCYTE COUNT: CPT

## 2017-04-28 PROCEDURE — 85027 COMPLETE CBC AUTOMATED: CPT

## 2017-04-28 PROCEDURE — 84100 ASSAY OF PHOSPHORUS: CPT

## 2017-04-28 PROCEDURE — 25000003 PHARM REV CODE 250: Performed by: INTERNAL MEDICINE

## 2017-04-28 PROCEDURE — 25000003 PHARM REV CODE 250: Performed by: HOSPITALIST

## 2017-04-28 PROCEDURE — 83605 ASSAY OF LACTIC ACID: CPT

## 2017-04-28 PROCEDURE — 87040 BLOOD CULTURE FOR BACTERIA: CPT

## 2017-04-28 PROCEDURE — 27000221 HC OXYGEN, UP TO 24 HOURS

## 2017-04-28 PROCEDURE — 81001 URINALYSIS AUTO W/SCOPE: CPT

## 2017-04-28 PROCEDURE — 94761 N-INVAS EAR/PLS OXIMETRY MLT: CPT

## 2017-04-28 PROCEDURE — 25000003 PHARM REV CODE 250: Performed by: PHYSICIAN ASSISTANT

## 2017-04-28 PROCEDURE — 87086 URINE CULTURE/COLONY COUNT: CPT

## 2017-04-28 PROCEDURE — 25000242 PHARM REV CODE 250 ALT 637 W/ HCPCS: Performed by: NURSE PRACTITIONER

## 2017-04-28 PROCEDURE — 63600175 PHARM REV CODE 636 W HCPCS: Performed by: HOSPITALIST

## 2017-04-28 PROCEDURE — 94640 AIRWAY INHALATION TREATMENT: CPT

## 2017-04-28 PROCEDURE — 36415 COLL VENOUS BLD VENIPUNCTURE: CPT

## 2017-04-28 PROCEDURE — 80053 COMPREHEN METABOLIC PANEL: CPT

## 2017-04-28 PROCEDURE — 11000001 HC ACUTE MED/SURG PRIVATE ROOM

## 2017-04-28 PROCEDURE — 84145 PROCALCITONIN (PCT): CPT

## 2017-04-28 PROCEDURE — 85007 BL SMEAR W/DIFF WBC COUNT: CPT

## 2017-04-28 PROCEDURE — 99233 SBSQ HOSP IP/OBS HIGH 50: CPT | Mod: ,,, | Performed by: HOSPITALIST

## 2017-04-28 PROCEDURE — 83735 ASSAY OF MAGNESIUM: CPT

## 2017-04-28 RX ORDER — METRONIDAZOLE 500 MG/100ML
500 INJECTION, SOLUTION INTRAVENOUS
Status: DISCONTINUED | OUTPATIENT
Start: 2017-04-28 | End: 2017-04-28

## 2017-04-28 RX ORDER — FENTANYL 50 UG/1
1 PATCH TRANSDERMAL
Status: DISCONTINUED | OUTPATIENT
Start: 2017-04-28 | End: 2017-05-01

## 2017-04-28 RX ORDER — OXYCODONE HYDROCHLORIDE 5 MG/1
30 TABLET ORAL EVERY 4 HOURS PRN
Status: DISCONTINUED | OUTPATIENT
Start: 2017-04-28 | End: 2017-05-03

## 2017-04-28 RX ORDER — LISINOPRIL 20 MG/1
40 TABLET ORAL DAILY
Status: DISCONTINUED | OUTPATIENT
Start: 2017-04-29 | End: 2017-05-05 | Stop reason: HOSPADM

## 2017-04-28 RX ORDER — METHYLPREDNISOLONE SOD SUCC 125 MG
80 VIAL (EA) INJECTION 2 TIMES DAILY
Status: DISCONTINUED | OUTPATIENT
Start: 2017-04-28 | End: 2017-04-28

## 2017-04-28 RX ORDER — HYDRALAZINE HYDROCHLORIDE 50 MG/1
100 TABLET, FILM COATED ORAL EVERY 8 HOURS PRN
Status: DISCONTINUED | OUTPATIENT
Start: 2017-04-28 | End: 2017-04-30

## 2017-04-28 RX ORDER — HYDROMORPHONE HYDROCHLORIDE 1 MG/ML
1.5 INJECTION, SOLUTION INTRAMUSCULAR; INTRAVENOUS; SUBCUTANEOUS ONCE
Status: COMPLETED | OUTPATIENT
Start: 2017-04-28 | End: 2017-04-28

## 2017-04-28 RX ORDER — RAMELTEON 8 MG/1
8 TABLET ORAL NIGHTLY PRN
Status: DISCONTINUED | OUTPATIENT
Start: 2017-04-29 | End: 2017-05-05 | Stop reason: HOSPADM

## 2017-04-28 RX ORDER — PREDNISONE 20 MG/1
40 TABLET ORAL DAILY
Status: DISCONTINUED | OUTPATIENT
Start: 2017-04-29 | End: 2017-05-04

## 2017-04-28 RX ORDER — LISINOPRIL 20 MG/1
20 TABLET ORAL ONCE
Status: COMPLETED | OUTPATIENT
Start: 2017-04-28 | End: 2017-04-28

## 2017-04-28 RX ORDER — LISINOPRIL 20 MG/1
20 TABLET ORAL DAILY
Status: DISCONTINUED | OUTPATIENT
Start: 2017-04-28 | End: 2017-04-28

## 2017-04-28 RX ORDER — HYDRALAZINE HYDROCHLORIDE 50 MG/1
50 TABLET, FILM COATED ORAL EVERY 8 HOURS PRN
Status: DISCONTINUED | OUTPATIENT
Start: 2017-04-28 | End: 2017-04-28

## 2017-04-28 RX ORDER — DIPHENHYDRAMINE HYDROCHLORIDE 50 MG/ML
50 INJECTION INTRAMUSCULAR; INTRAVENOUS ONCE
Status: COMPLETED | OUTPATIENT
Start: 2017-04-28 | End: 2017-04-28

## 2017-04-28 RX ADMIN — AMLODIPINE BESYLATE 10 MG: 10 TABLET ORAL at 10:04

## 2017-04-28 RX ADMIN — VANCOMYCIN HYDROCHLORIDE 2000 MG: 1 INJECTION, POWDER, LYOPHILIZED, FOR SOLUTION INTRAVENOUS at 11:04

## 2017-04-28 RX ADMIN — FLUOXETINE 40 MG: 20 CAPSULE ORAL at 09:04

## 2017-04-28 RX ADMIN — AMPICILLIN SODIUM AND SULBACTAM SODIUM 3 G: 2; 1 INJECTION, POWDER, FOR SOLUTION INTRAMUSCULAR; INTRAVENOUS at 06:04

## 2017-04-28 RX ADMIN — VANCOMYCIN HYDROCHLORIDE 2000 MG: 1 INJECTION, POWDER, LYOPHILIZED, FOR SOLUTION INTRAVENOUS at 02:04

## 2017-04-28 RX ADMIN — IBUPROFEN 400 MG: 400 TABLET, FILM COATED ORAL at 09:04

## 2017-04-28 RX ADMIN — FLUTICASONE FUROATE AND VILANTEROL TRIFENATATE 1 PUFF: 100; 25 POWDER RESPIRATORY (INHALATION) at 09:04

## 2017-04-28 RX ADMIN — IPRATROPIUM BROMIDE AND ALBUTEROL SULFATE 3 ML: .5; 3 SOLUTION RESPIRATORY (INHALATION) at 03:04

## 2017-04-28 RX ADMIN — FENTANYL 1 PATCH: 50 PATCH, EXTENDED RELEASE TRANSDERMAL at 01:04

## 2017-04-28 RX ADMIN — CARBAMAZEPINE 400 MG: 200 TABLET ORAL at 09:04

## 2017-04-28 RX ADMIN — PANTOPRAZOLE SODIUM 600 MG: 40 TABLET, DELAYED RELEASE ORAL at 09:04

## 2017-04-28 RX ADMIN — HEPARIN SODIUM 5000 UNITS: 5000 INJECTION, SOLUTION INTRAVENOUS; SUBCUTANEOUS at 01:04

## 2017-04-28 RX ADMIN — CARVEDILOL 25 MG: 25 TABLET, FILM COATED ORAL at 09:04

## 2017-04-28 RX ADMIN — RAMELTEON 8 MG: 8 TABLET, FILM COATED ORAL at 11:04

## 2017-04-28 RX ADMIN — LISINOPRIL 20 MG: 20 TABLET ORAL at 09:04

## 2017-04-28 RX ADMIN — STANDARDIZED SENNA CONCENTRATE AND DOCUSATE SODIUM 1 TABLET: 8.6; 5 TABLET, FILM COATED ORAL at 09:04

## 2017-04-28 RX ADMIN — GABAPENTIN 600 MG: 300 CAPSULE ORAL at 09:04

## 2017-04-28 RX ADMIN — METHYLPREDNISOLONE SODIUM SUCCINATE 80 MG: 125 INJECTION, POWDER, FOR SOLUTION INTRAMUSCULAR; INTRAVENOUS at 09:04

## 2017-04-28 RX ADMIN — HYDROMORPHONE HYDROCHLORIDE 3 MG: 1 INJECTION, SOLUTION INTRAMUSCULAR; INTRAVENOUS; SUBCUTANEOUS at 02:04

## 2017-04-28 RX ADMIN — OXYCODONE HYDROCHLORIDE 10 MG: 5 TABLET ORAL at 12:04

## 2017-04-28 RX ADMIN — Medication 3 ML: at 02:04

## 2017-04-28 RX ADMIN — BACLOFEN 10 MG: 10 TABLET ORAL at 09:04

## 2017-04-28 RX ADMIN — METHYLPREDNISOLONE SODIUM SUCCINATE 80 MG: 125 INJECTION, POWDER, FOR SOLUTION INTRAMUSCULAR; INTRAVENOUS at 04:04

## 2017-04-28 RX ADMIN — PROMETHAZINE HYDROCHLORIDE AND CODEINE PHOSPHATE 5 ML: 6.25; 1 SYRUP ORAL at 11:04

## 2017-04-28 RX ADMIN — IPRATROPIUM BROMIDE AND ALBUTEROL SULFATE 3 ML: .5; 3 SOLUTION RESPIRATORY (INHALATION) at 07:04

## 2017-04-28 RX ADMIN — HYDRALAZINE HYDROCHLORIDE 50 MG: 50 TABLET ORAL at 12:04

## 2017-04-28 RX ADMIN — FLUTICASONE PROPIONATE 2 SPRAY: 50 SPRAY, METERED NASAL at 09:04

## 2017-04-28 RX ADMIN — BENZONATATE 100 MG: 100 CAPSULE ORAL at 06:04

## 2017-04-28 RX ADMIN — HYDROMORPHONE HYDROCHLORIDE 3 MG: 1 INJECTION, SOLUTION INTRAMUSCULAR; INTRAVENOUS; SUBCUTANEOUS at 12:04

## 2017-04-28 RX ADMIN — HYDROMORPHONE HYDROCHLORIDE 3 MG: 1 INJECTION, SOLUTION INTRAMUSCULAR; INTRAVENOUS; SUBCUTANEOUS at 08:04

## 2017-04-28 RX ADMIN — MOXIFLOXACIN HYDROCHLORIDE 400 MG: 400 TABLET, FILM COATED ORAL at 09:04

## 2017-04-28 RX ADMIN — LISINOPRIL 20 MG: 20 TABLET ORAL at 01:04

## 2017-04-28 RX ADMIN — IPRATROPIUM BROMIDE AND ALBUTEROL SULFATE 3 ML: .5; 3 SOLUTION RESPIRATORY (INHALATION) at 11:04

## 2017-04-28 RX ADMIN — DIPHENHYDRAMINE HYDROCHLORIDE 25 MG: 25 CAPSULE ORAL at 11:04

## 2017-04-28 RX ADMIN — PROMETHAZINE HYDROCHLORIDE AND CODEINE PHOSPHATE 5 ML: 6.25; 1 SYRUP ORAL at 06:04

## 2017-04-28 RX ADMIN — HYDROMORPHONE HYDROCHLORIDE 1.5 MG: 1 INJECTION, SOLUTION INTRAMUSCULAR; INTRAVENOUS; SUBCUTANEOUS at 09:04

## 2017-04-28 RX ADMIN — IBUPROFEN 400 MG: 400 TABLET, FILM COATED ORAL at 01:04

## 2017-04-28 RX ADMIN — OXYCODONE HYDROCHLORIDE 10 MG: 5 TABLET ORAL at 05:04

## 2017-04-28 RX ADMIN — CETIRIZINE HYDROCHLORIDE 5 MG: 5 TABLET, FILM COATED ORAL at 09:04

## 2017-04-28 RX ADMIN — DIPHENHYDRAMINE HYDROCHLORIDE 25 MG: 25 CAPSULE ORAL at 12:04

## 2017-04-28 RX ADMIN — HYDROMORPHONE HYDROCHLORIDE 3 MG: 1 INJECTION, SOLUTION INTRAMUSCULAR; INTRAVENOUS; SUBCUTANEOUS at 09:04

## 2017-04-28 RX ADMIN — HYDROMORPHONE HYDROCHLORIDE 3 MG: 1 INJECTION, SOLUTION INTRAMUSCULAR; INTRAVENOUS; SUBCUTANEOUS at 11:04

## 2017-04-28 RX ADMIN — Medication 3 ML: at 09:04

## 2017-04-28 RX ADMIN — HYDROMORPHONE HYDROCHLORIDE 3 MG: 1 INJECTION, SOLUTION INTRAMUSCULAR; INTRAVENOUS; SUBCUTANEOUS at 05:04

## 2017-04-28 RX ADMIN — AMPICILLIN SODIUM AND SULBACTAM SODIUM 3 G: 2; 1 INJECTION, POWDER, FOR SOLUTION INTRAMUSCULAR; INTRAVENOUS at 11:04

## 2017-04-28 RX ADMIN — HEPARIN SODIUM 5000 UNITS: 5000 INJECTION, SOLUTION INTRAVENOUS; SUBCUTANEOUS at 04:04

## 2017-04-28 RX ADMIN — DIPHENHYDRAMINE HYDROCHLORIDE 50 MG: 50 INJECTION, SOLUTION INTRAMUSCULAR; INTRAVENOUS at 09:04

## 2017-04-28 RX ADMIN — HYDROMORPHONE HYDROCHLORIDE 3 MG: 1 INJECTION, SOLUTION INTRAMUSCULAR; INTRAVENOUS; SUBCUTANEOUS at 04:04

## 2017-04-28 RX ADMIN — HEPARIN SODIUM 5000 UNITS: 5000 INJECTION, SOLUTION INTRAVENOUS; SUBCUTANEOUS at 09:04

## 2017-04-28 RX ADMIN — IBUPROFEN 400 MG: 400 TABLET, FILM COATED ORAL at 04:04

## 2017-04-28 NOTE — PLAN OF CARE
Problem: Patient Care Overview  Goal: Plan of Care Review  Outcome: Ongoing (interventions implemented as appropriate)  Pt is free from injury and falls during shift. Pt shows no signs of acute distress. Pt c/o pain and cough throughout shift. PRN meds given (see MAR). AAO. Vitals stable. Pt ambulated in room and cuellar several times today. Pt reports SOB on exertion. Pt continues to wear nasal cannula with O2 at 3 L. Bed is in low position with breaks locked. Side rails are up x 2. Environment is clear of clutter free. Call light is within reach of the patient. Will continue to monitor.

## 2017-04-28 NOTE — SUBJECTIVE & OBJECTIVE
Interval History: patient is still having a cough and mild SOB; wheezing improved; still requiring oxygen; gets pain when she starts coughing; coughing likely due to asthma and post nasal drip    Review of Systems   Constitutional: Negative for chills and fever.   HENT: Negative for rhinorrhea and sore throat.    Eyes: Negative for pain and discharge.   Respiratory: Positive for cough and shortness of breath. Negative for wheezing.    Cardiovascular: Negative for chest pain and leg swelling.   Gastrointestinal: Negative for abdominal distention, abdominal pain, blood in stool, nausea and vomiting.   Endocrine: Negative for cold intolerance and heat intolerance.   Genitourinary: Negative for dysuria and flank pain.   Neurological: Negative for dizziness and numbness.   Psychiatric/Behavioral: Negative for behavioral problems and confusion.     Objective:     Vital Signs (Most Recent):  Temp: 99.2 °F (37.3 °C) (04/27/17 1600)  Pulse: 89 (04/27/17 1938)  Resp: 18 (04/27/17 1938)  BP: (!) 166/97 (04/27/17 1600)  SpO2: (!) 90 % (04/27/17 1938) Vital Signs (24h Range):  Temp:  [98.2 °F (36.8 °C)-99.2 °F (37.3 °C)] 99.2 °F (37.3 °C)  Pulse:  [72-98] 89  Resp:  [16-20] 18  SpO2:  [90 %-99 %] 90 %  BP: (136-181)/() 166/97     Weight: 136.1 kg (300 lb)  Body mass index is 54.87 kg/(m^2).  No intake or output data in the 24 hours ending 04/27/17 2023   Physical Exam   Constitutional: She is oriented to person, place, and time. She appears well-developed and well-nourished.   HENT:   Head: Normocephalic and atraumatic.   Eyes: Conjunctivae are normal. Pupils are equal, round, and reactive to light.   Neck: Normal range of motion. No thyromegaly present.   Cardiovascular: Normal rate, regular rhythm and normal heart sounds.    Pulmonary/Chest: No respiratory distress. She has no wheezes.   Abdominal: Soft. She exhibits no distension. There is no tenderness.   Musculoskeletal: Normal range of motion. She exhibits no edema.    Neurological: She is alert and oriented to person, place, and time.   Skin: No erythema. No pallor.       Significant Labs:   CBC:     Recent Labs  Lab 04/26/17  0604 04/27/17  0526   WBC 18.36* 22.97*   HGB 9.6* 9.7*   HCT 28.8* 28.9*    223     CMP:     Recent Labs  Lab 04/26/17  0604 04/27/17  0525    140   K 4.1 4.4    105   CO2 23 27   * 125*   BUN 12 14   CREATININE 0.7 0.8   CALCIUM 8.9 8.9   PROT 7.5 7.5   ALBUMIN 3.3* 3.4*   BILITOT 0.4 0.4   ALKPHOS 98 100   AST 24 24   ALT 25 30   ANIONGAP 13 8   EGFRNONAA >60.0 >60.0       Significant Imaging:   CXR- no acute process

## 2017-04-28 NOTE — PROGRESS NOTES
"[facility] Hospital Medicine  Progress Note    Patient Name: Nazanin Malone  MRN: [id number]  Patient Class: IP- Inpatient   Admission Date: 4/16/2017  Length of Stay: 11 days  Attending Physician: Raymond Palacio MD  Primary Care Provider: Primary Doctor [facility] Medicine Team: [facility] HOSP MED A Raymond Palacio MD    Subjective:     Principal Problem:Sickle-cell disease with pain    HPI:  Nazanin Malone is a 37 y.o. female who  has a PMH of Hypertension; Sickle cell-beta thalassemia disease with pain; and Trigeminal neuralgia. The patient presented to [facility] on 4/16/2017 due to worsening right leg pain over the last week as well as a cough productive of green mucous. She states she came to ED 2 days ago for her cough and was prescribed Augmentin. She says the mucous is no longer green, but she still feels all "stuffed up and congested." She has had to be admitted a few other times due to sickle pain crisis, but rarely needs transfusions. Persistently asking for more pain medications,     Hospital Course:  4/25- audible wheezing; afebrile; switched to solumedrol    4/26/17- patient is no longer wheezing and feels much better today    4/27/17- states cough is still bothering here and leads to pain; still requiring oxygen and will likely need to go home on that;       Interval History: patient is still having a cough and mild SOB; wheezing improved; still requiring oxygen; gets pain when she starts coughing; coughing likely due to asthma and post nasal drip    Review of Systems   Constitutional: Negative for chills and fever.   HENT: Negative for rhinorrhea and sore throat.    Eyes: Negative for pain and discharge.   Respiratory: Positive for cough and shortness of breath. Negative for wheezing.    Cardiovascular: Negative for chest pain and leg swelling.   Gastrointestinal: Negative for abdominal distention, abdominal pain, blood in stool, nausea and vomiting.   Endocrine: " Negative for cold intolerance and heat intolerance.   Genitourinary: Negative for dysuria and flank pain.   Neurological: Negative for dizziness and numbness.   Psychiatric/Behavioral: Negative for behavioral problems and confusion.     Objective:     Vital Signs (Most Recent):  Temp: 99.2 °F (37.3 °C) (04/27/17 1600)  Pulse: 89 (04/27/17 1938)  Resp: 18 (04/27/17 1938)  BP: (!) 166/97 (04/27/17 1600)  SpO2: (!) 90 % (04/27/17 1938) Vital Signs (24h Range):  Temp:  [98.2 °F (36.8 °C)-99.2 °F (37.3 °C)] 99.2 °F (37.3 °C)  Pulse:  [72-98] 89  Resp:  [16-20] 18  SpO2:  [90 %-99 %] 90 %  BP: (136-181)/() 166/97     Weight: 136.1 kg (300 lb)  Body mass index is 54.87 kg/(m^2).  No intake or output data in the 24 hours ending 04/27/17 2023   Physical Exam   Constitutional: She is oriented to person, place, and time. She appears well-developed and well-nourished.   HENT:   Head: Normocephalic and atraumatic.   Eyes: Conjunctivae are normal. Pupils are equal, round, and reactive to light.   Neck: Normal range of motion. No thyromegaly present.   Cardiovascular: Normal rate, regular rhythm and normal heart sounds.    Pulmonary/Chest: No respiratory distress. She has no wheezes.   Abdominal: Soft. She exhibits no distension. There is no tenderness.   Musculoskeletal: Normal range of motion. She exhibits no edema.   Neurological: She is alert and oriented to person, place, and time.   Skin: No erythema. No pallor.       Significant Labs:   CBC:     Recent Labs  Lab 04/26/17  0604 04/27/17  0526   WBC 18.36* 22.97*   HGB 9.6* 9.7*   HCT 28.8* 28.9*    223     CMP:     Recent Labs  Lab 04/26/17  0604 04/27/17  0525    140   K 4.1 4.4    105   CO2 23 27   * 125*   BUN 12 14   CREATININE 0.7 0.8   CALCIUM 8.9 8.9   PROT 7.5 7.5   ALBUMIN 3.3* 3.4*   BILITOT 0.4 0.4   ALKPHOS 98 100   AST 24 24   ALT 25 30   ANIONGAP 13 8   EGFRNONAA >60.0 >60.0       Significant Imaging:   CXR- no acute  process    Assessment/Plan:         # Sickle Cell pain crisis  # opioid dependence  - pt having complaints typical of her pain crisis.  - MRI of the right HIP did not reveal any evidence of avascular necrosis  - Dilaudid 2mg IV q4 prn  - Seen by Hematology appreciate recs; pain currently well controlled      # Asthma exacerbation  # Acute respiratory failure with hypoxia  - improving   - solumedrol to 80 mg IV TID, another dose of magnesium IV  - will start advair; needs pulmonology follow up  - will also add mucinex      # Sepsis due to suspected Strep PNA  - On Avelox  - seen by ID   - continue oxygen as needed. Will continue to monitor.   - added mucinex for cough suppresant  - acapella every 4 hrs PRN           #Bronchitis  Duonebs PRN  Start Prednisone 50mg daily  Advair      # Benign Essential HTN  - continue home Norvasc  - uncontrolled; increasing coreg 25 mg PO BID today  - would add ace if BP stays elevated after adequate pain control      # RLE swelling and pain  - BLE ultrasound negative for DVT  MRI of the Hip revealed periostitis, negative for Avascular necrosis      # Morbid Obesity  Body mass index is 54.87 kg/(m^2).      PT/OT          DVT/GI ppx  - Hep SQ  - regular diet       Discharge Disposition- likely tomorrow with oxygen; needs walk test in the AM          VTE Risk Mitigation         Ordered     heparin (porcine) injection 5,000 Units  Every 8 hours     Route:  Subcutaneous        04/16/17 0813     Medium Risk of VTE  Once      04/16/17 0813     Place sequential compression device  Until discontinued      04/16/17 0813     Place JOSE LUIS hose  Until discontinued      04/16/17 0813          Raymond Palacio MD  Department of Hospital Medicine   [facility]

## 2017-04-28 NOTE — PROGRESS NOTES
Pt c/o excruciating pain all over body. States all pain meds are not working. Instructed pt that the next PRN dose of pain meds are due in 10 min. IM A paged. Will continue to monitor.

## 2017-04-28 NOTE — PLAN OF CARE
04/28/17 1144   Discharge Reassessment   Assessment Type Discharge Planning Reassessment   Can the patient answer the patient profile reliably? Yes, cognitively intact   How does the patient rate their overall health at the present time? Fair   Describe the patient's ability to walk at the present time. Minor restrictions or changes   How often would a person be available to care for the patient? Occasionally   Number of comorbid conditions (as recorded on the chart) Three   During the past month, has the patient often been bothered by feeling down, depressed or hopeless? No   During the past month, has the patient often been bothered by little interest or pleasure in doing things? No   Discharge plan remains the same: Yes   Discharge Plan A Home with family   Change in patient condition or support system No   Involved the patient/caregiver in establishing a new discharge plan: Yes

## 2017-04-28 NOTE — PLAN OF CARE
Problem: Patient Care Overview  Goal: Plan of Care Review  Outcome: Ongoing (interventions implemented as appropriate)  Pt is free from injury and falls during shift. Pt shows no signs of acute distress. Pt c/o pain throughout shift. PRN and scheduled pain meds administered throughout shift that provided some relief. Pt states that she is feeling better now. Pt AAO. Vitals stable. Pt ambulated in room several times today. Bed is in low position with breaks locked. Side rails are up x 2. Environment is clutter free. Call light is within reach of the patient. Will continue to monitor.

## 2017-04-28 NOTE — PROGRESS NOTES
Pt c/o pain all over body. States PRN dilaudid did not work and pain is getting worse. Pt states that the PRN oxy PO med does not work. IM A paged. Will continue to monitor.

## 2017-04-28 NOTE — PROGRESS NOTES
Pt c/o excruciating pain all over body. Pt c/o cough. BP /100. Pt denies any vision changes. PRN dilaudid administered. Will administer 9 AM meds. Will recheck BP. IM A paged. Will continue to monitor.

## 2017-04-29 PROBLEM — J18.9 MULTIFOCAL PNEUMONIA: Status: ACTIVE | Noted: 2017-04-29

## 2017-04-29 PROBLEM — D57.01 ACUTE CHEST SYNDROME DUE TO HEMOGLOBIN S DISEASE: Status: ACTIVE | Noted: 2017-04-29

## 2017-04-29 LAB
ALBUMIN SERPL BCP-MCNC: 3.4 G/DL
ALLENS TEST: ABNORMAL
ALP SERPL-CCNC: 135 U/L
ALT SERPL W/O P-5'-P-CCNC: 28 U/L
ANION GAP SERPL CALC-SCNC: 12 MMOL/L
ANISOCYTOSIS BLD QL SMEAR: SLIGHT
AST SERPL-CCNC: 24 U/L
BASOPHILS # BLD AUTO: ABNORMAL K/UL
BASOPHILS NFR BLD: 0 %
BILIRUB SERPL-MCNC: 0.5 MG/DL
BUN SERPL-MCNC: 10 MG/DL
CALCIUM SERPL-MCNC: 8.7 MG/DL
CHLORIDE SERPL-SCNC: 102 MMOL/L
CO2 SERPL-SCNC: 24 MMOL/L
CREAT SERPL-MCNC: 0.7 MG/DL
DELSYS: ABNORMAL
DIFFERENTIAL METHOD: ABNORMAL
EOSINOPHIL # BLD AUTO: ABNORMAL K/UL
EOSINOPHIL NFR BLD: 0 %
ERYTHROCYTE [DISTWIDTH] IN BLOOD BY AUTOMATED COUNT: 20.8 %
EST. GFR  (AFRICAN AMERICAN): >60 ML/MIN/1.73 M^2
EST. GFR  (NON AFRICAN AMERICAN): >60 ML/MIN/1.73 M^2
FLOW: 3
GLUCOSE SERPL-MCNC: 146 MG/DL
HCO3 UR-SCNC: 33.9 MMOL/L (ref 24–28)
HCT VFR BLD AUTO: 28.5 %
HGB BLD-MCNC: 9.8 G/DL
LYMPHOCYTES # BLD AUTO: ABNORMAL K/UL
LYMPHOCYTES NFR BLD: 16 %
MAGNESIUM SERPL-MCNC: 2.3 MG/DL
MCH RBC QN AUTO: 23.4 PG
MCHC RBC AUTO-ENTMCNC: 34.4 %
MCV RBC AUTO: 68 FL
METAMYELOCYTES NFR BLD MANUAL: 3 %
MODE: ABNORMAL
MONOCYTES # BLD AUTO: ABNORMAL K/UL
MONOCYTES NFR BLD: 7 %
NEUTROPHILS NFR BLD: 72 %
NEUTS BAND NFR BLD MANUAL: 2 %
NRBC BLD-RTO: ABNORMAL /100 WBC
OVALOCYTES BLD QL SMEAR: ABNORMAL
PCO2 BLDA: 56 MMHG (ref 35–45)
PH SMN: 7.39 [PH] (ref 7.35–7.45)
PHOSPHATE SERPL-MCNC: 3.6 MG/DL
PLATELET # BLD AUTO: 235 K/UL
PMV BLD AUTO: 9.1 FL
PO2 BLDA: 78 MMHG (ref 80–100)
POC BE: 9 MMOL/L
POC SATURATED O2: 95 % (ref 95–100)
POC TCO2: 36 MMOL/L (ref 23–27)
POIKILOCYTOSIS BLD QL SMEAR: SLIGHT
POLYCHROMASIA BLD QL SMEAR: ABNORMAL
POTASSIUM SERPL-SCNC: 4.3 MMOL/L
PROT SERPL-MCNC: 7.4 G/DL
RBC # BLD AUTO: 4.19 M/UL
RETICS/RBC NFR AUTO: 5.9 %
SAMPLE: ABNORMAL
SITE: ABNORMAL
SODIUM SERPL-SCNC: 138 MMOL/L
TARGETS BLD QL SMEAR: ABNORMAL
VANCOMYCIN TROUGH SERPL-MCNC: 19.2 UG/ML
WBC # BLD AUTO: 20.29 K/UL
WBC NRBC COR # BLD: 17.49 K/UL

## 2017-04-29 PROCEDURE — 80053 COMPREHEN METABOLIC PANEL: CPT

## 2017-04-29 PROCEDURE — 84100 ASSAY OF PHOSPHORUS: CPT

## 2017-04-29 PROCEDURE — 25000242 PHARM REV CODE 250 ALT 637 W/ HCPCS: Performed by: NURSE PRACTITIONER

## 2017-04-29 PROCEDURE — 36600 WITHDRAWAL OF ARTERIAL BLOOD: CPT

## 2017-04-29 PROCEDURE — 63600175 PHARM REV CODE 636 W HCPCS: Performed by: HOSPITALIST

## 2017-04-29 PROCEDURE — 82803 BLOOD GASES ANY COMBINATION: CPT

## 2017-04-29 PROCEDURE — 27000221 HC OXYGEN, UP TO 24 HOURS

## 2017-04-29 PROCEDURE — 85007 BL SMEAR W/DIFF WBC COUNT: CPT

## 2017-04-29 PROCEDURE — 94640 AIRWAY INHALATION TREATMENT: CPT

## 2017-04-29 PROCEDURE — 25000003 PHARM REV CODE 250: Performed by: HOSPITALIST

## 2017-04-29 PROCEDURE — 11000001 HC ACUTE MED/SURG PRIVATE ROOM

## 2017-04-29 PROCEDURE — 85027 COMPLETE CBC AUTOMATED: CPT

## 2017-04-29 PROCEDURE — 99232 SBSQ HOSP IP/OBS MODERATE 35: CPT | Mod: ,,, | Performed by: INTERNAL MEDICINE

## 2017-04-29 PROCEDURE — 83735 ASSAY OF MAGNESIUM: CPT

## 2017-04-29 PROCEDURE — 80202 ASSAY OF VANCOMYCIN: CPT

## 2017-04-29 PROCEDURE — 94664 DEMO&/EVAL PT USE INHALER: CPT

## 2017-04-29 PROCEDURE — 25000003 PHARM REV CODE 250: Performed by: PHYSICIAN ASSISTANT

## 2017-04-29 PROCEDURE — 36415 COLL VENOUS BLD VENIPUNCTURE: CPT

## 2017-04-29 PROCEDURE — 99233 SBSQ HOSP IP/OBS HIGH 50: CPT | Mod: ,,, | Performed by: HOSPITALIST

## 2017-04-29 PROCEDURE — 85045 AUTOMATED RETICULOCYTE COUNT: CPT

## 2017-04-29 RX ORDER — DIPHENHYDRAMINE HYDROCHLORIDE 50 MG/ML
50 INJECTION INTRAMUSCULAR; INTRAVENOUS ONCE
Status: COMPLETED | OUTPATIENT
Start: 2017-04-29 | End: 2017-04-29

## 2017-04-29 RX ADMIN — OXYCODONE HYDROCHLORIDE 30 MG: 5 TABLET ORAL at 09:04

## 2017-04-29 RX ADMIN — GABAPENTIN 600 MG: 300 CAPSULE ORAL at 09:04

## 2017-04-29 RX ADMIN — SODIUM CHLORIDE: 0.9 INJECTION, SOLUTION INTRAVENOUS at 10:04

## 2017-04-29 RX ADMIN — Medication 10 ML: at 09:04

## 2017-04-29 RX ADMIN — HEPARIN SODIUM 5000 UNITS: 5000 INJECTION, SOLUTION INTRAVENOUS; SUBCUTANEOUS at 02:04

## 2017-04-29 RX ADMIN — IPRATROPIUM BROMIDE AND ALBUTEROL SULFATE 3 ML: .5; 3 SOLUTION RESPIRATORY (INHALATION) at 08:04

## 2017-04-29 RX ADMIN — CARVEDILOL 25 MG: 25 TABLET, FILM COATED ORAL at 08:04

## 2017-04-29 RX ADMIN — HYDROMORPHONE HYDROCHLORIDE 3 MG: 1 INJECTION, SOLUTION INTRAMUSCULAR; INTRAVENOUS; SUBCUTANEOUS at 12:04

## 2017-04-29 RX ADMIN — IPRATROPIUM BROMIDE AND ALBUTEROL SULFATE 3 ML: .5; 3 SOLUTION RESPIRATORY (INHALATION) at 03:04

## 2017-04-29 RX ADMIN — BACLOFEN 10 MG: 10 TABLET ORAL at 08:04

## 2017-04-29 RX ADMIN — IPRATROPIUM BROMIDE AND ALBUTEROL SULFATE 3 ML: .5; 3 SOLUTION RESPIRATORY (INHALATION) at 04:04

## 2017-04-29 RX ADMIN — AMPICILLIN SODIUM AND SULBACTAM SODIUM 3 G: 2; 1 INJECTION, POWDER, FOR SOLUTION INTRAMUSCULAR; INTRAVENOUS at 12:04

## 2017-04-29 RX ADMIN — BENZONATATE 100 MG: 100 CAPSULE ORAL at 06:04

## 2017-04-29 RX ADMIN — BACLOFEN 10 MG: 10 TABLET ORAL at 09:04

## 2017-04-29 RX ADMIN — HYDROMORPHONE HYDROCHLORIDE 3 MG: 1 INJECTION, SOLUTION INTRAMUSCULAR; INTRAVENOUS; SUBCUTANEOUS at 09:04

## 2017-04-29 RX ADMIN — PROMETHAZINE HYDROCHLORIDE AND CODEINE PHOSPHATE 5 ML: 6.25; 1 SYRUP ORAL at 06:04

## 2017-04-29 RX ADMIN — IPRATROPIUM BROMIDE AND ALBUTEROL SULFATE 3 ML: .5; 3 SOLUTION RESPIRATORY (INHALATION) at 12:04

## 2017-04-29 RX ADMIN — AMPICILLIN SODIUM AND SULBACTAM SODIUM 3 G: 2; 1 INJECTION, POWDER, FOR SOLUTION INTRAMUSCULAR; INTRAVENOUS at 05:04

## 2017-04-29 RX ADMIN — CARVEDILOL 25 MG: 25 TABLET, FILM COATED ORAL at 09:04

## 2017-04-29 RX ADMIN — AMLODIPINE BESYLATE 10 MG: 10 TABLET ORAL at 08:04

## 2017-04-29 RX ADMIN — HYDROMORPHONE HYDROCHLORIDE 3 MG: 1 INJECTION, SOLUTION INTRAMUSCULAR; INTRAVENOUS; SUBCUTANEOUS at 06:04

## 2017-04-29 RX ADMIN — CARBAMAZEPINE 400 MG: 200 TABLET ORAL at 08:04

## 2017-04-29 RX ADMIN — PROMETHAZINE HYDROCHLORIDE AND CODEINE PHOSPHATE 5 ML: 6.25; 1 SYRUP ORAL at 09:04

## 2017-04-29 RX ADMIN — PANTOPRAZOLE SODIUM 600 MG: 40 TABLET, DELAYED RELEASE ORAL at 08:04

## 2017-04-29 RX ADMIN — FLUTICASONE PROPIONATE 2 SPRAY: 50 SPRAY, METERED NASAL at 08:04

## 2017-04-29 RX ADMIN — PREDNISONE 40 MG: 20 TABLET ORAL at 08:04

## 2017-04-29 RX ADMIN — FLUTICASONE FUROATE AND VILANTEROL TRIFENATATE 1 PUFF: 100; 25 POWDER RESPIRATORY (INHALATION) at 08:04

## 2017-04-29 RX ADMIN — Medication 3 ML: at 06:04

## 2017-04-29 RX ADMIN — AMPICILLIN SODIUM AND SULBACTAM SODIUM 3 G: 2; 1 INJECTION, POWDER, FOR SOLUTION INTRAMUSCULAR; INTRAVENOUS at 11:04

## 2017-04-29 RX ADMIN — STANDARDIZED SENNA CONCENTRATE AND DOCUSATE SODIUM 1 TABLET: 8.6; 5 TABLET, FILM COATED ORAL at 08:04

## 2017-04-29 RX ADMIN — VANCOMYCIN HYDROCHLORIDE 2000 MG: 1 INJECTION, POWDER, LYOPHILIZED, FOR SOLUTION INTRAVENOUS at 02:04

## 2017-04-29 RX ADMIN — HEPARIN SODIUM 5000 UNITS: 5000 INJECTION, SOLUTION INTRAVENOUS; SUBCUTANEOUS at 06:04

## 2017-04-29 RX ADMIN — HYDROMORPHONE HYDROCHLORIDE 3 MG: 1 INJECTION, SOLUTION INTRAMUSCULAR; INTRAVENOUS; SUBCUTANEOUS at 02:04

## 2017-04-29 RX ADMIN — OXYCODONE HYDROCHLORIDE 30 MG: 5 TABLET ORAL at 04:04

## 2017-04-29 RX ADMIN — HYDROMORPHONE HYDROCHLORIDE 3 MG: 1 INJECTION, SOLUTION INTRAMUSCULAR; INTRAVENOUS; SUBCUTANEOUS at 03:04

## 2017-04-29 RX ADMIN — IBUPROFEN 400 MG: 400 TABLET, FILM COATED ORAL at 02:04

## 2017-04-29 RX ADMIN — IPRATROPIUM BROMIDE AND ALBUTEROL SULFATE 3 ML: .5; 3 SOLUTION RESPIRATORY (INHALATION) at 07:04

## 2017-04-29 RX ADMIN — AMPICILLIN SODIUM AND SULBACTAM SODIUM 3 G: 2; 1 INJECTION, POWDER, FOR SOLUTION INTRAMUSCULAR; INTRAVENOUS at 04:04

## 2017-04-29 RX ADMIN — FLUOXETINE 40 MG: 20 CAPSULE ORAL at 08:04

## 2017-04-29 RX ADMIN — CARBAMAZEPINE 400 MG: 200 TABLET ORAL at 09:04

## 2017-04-29 RX ADMIN — STANDARDIZED SENNA CONCENTRATE AND DOCUSATE SODIUM 1 TABLET: 8.6; 5 TABLET, FILM COATED ORAL at 09:04

## 2017-04-29 RX ADMIN — IBUPROFEN 400 MG: 400 TABLET, FILM COATED ORAL at 09:04

## 2017-04-29 RX ADMIN — PANTOPRAZOLE SODIUM 600 MG: 40 TABLET, DELAYED RELEASE ORAL at 09:04

## 2017-04-29 RX ADMIN — OXYCODONE HYDROCHLORIDE 30 MG: 5 TABLET ORAL at 08:04

## 2017-04-29 RX ADMIN — GABAPENTIN 600 MG: 300 CAPSULE ORAL at 08:04

## 2017-04-29 RX ADMIN — OXYCODONE HYDROCHLORIDE 30 MG: 5 TABLET ORAL at 05:04

## 2017-04-29 RX ADMIN — CETIRIZINE HYDROCHLORIDE 5 MG: 5 TABLET, FILM COATED ORAL at 08:04

## 2017-04-29 RX ADMIN — Medication 3 ML: at 02:04

## 2017-04-29 RX ADMIN — LISINOPRIL 40 MG: 20 TABLET ORAL at 08:04

## 2017-04-29 RX ADMIN — HEPARIN SODIUM 5000 UNITS: 5000 INJECTION, SOLUTION INTRAVENOUS; SUBCUTANEOUS at 09:04

## 2017-04-29 RX ADMIN — DIPHENHYDRAMINE HYDROCHLORIDE 50 MG: 50 INJECTION, SOLUTION INTRAMUSCULAR; INTRAVENOUS at 09:04

## 2017-04-29 RX ADMIN — IBUPROFEN 400 MG: 400 TABLET, FILM COATED ORAL at 06:04

## 2017-04-29 NOTE — PROGRESS NOTES
"[facility] Hospital Medicine  Progress Note    Patient Name: Nazanin Malone  MRN: [id number]  Patient Class: IP- Inpatient   Admission Date: 4/16/2017  Length of Stay: 13 days  Attending Physician: Raymond Palacio MD  Primary Care Provider: [facility] Medicine Team: [facility]OSP MED A Raymond Palacio MD    Subjective:     Principal Problem:Sickle-cell disease with pain    HPI:  Nazanin Malone is a 37 y.o. female who  has a PMH of Hypertension; Sickle cell-beta thalassemia disease with pain; and Trigeminal neuralgia. The patient presented to [facility] on 4/16/2017 due to worsening right leg pain over the last week as well as a cough productive of green mucous. She states she came to ED 2 days ago for her cough and was prescribed Augmentin. She says the mucous is no longer green, but she still feels all "stuffed up and congested." She has had to be admitted a few other times due to sickle pain crisis, but rarely needs transfusions. Persistently asking for more pain medications,     Hospital Course:  4/25- audible wheezing; afebrile; switched to solumedrol    4/26/17- patient is no longer wheezing and feels much better today    4/27/17- states cough is still bothering here and leads to pain; still requiring oxygen and will likely need to go home on that;     4/28- CT chest shows multifocal PNA; concerning for acute chest syndrome, abg, re-consult heme/onc      Interval History: patient had a CT chest today and shows multi-focal PNA; anti-biotics have been broadened; ABG pending; patient has been having uncontrolled and pain and BPs all day; placed patient on a fentanyl patch and increased BP meds; will re-consult heme/onc    Review of Systems   Constitutional: Negative for chills and fever.   HENT: Negative for rhinorrhea and sore throat.    Eyes: Negative for pain and discharge.   Respiratory: Positive for cough and shortness of breath. Negative for wheezing.  "   Cardiovascular: Negative for chest pain and leg swelling.   Gastrointestinal: Negative for abdominal distention, abdominal pain, blood in stool, nausea and vomiting.   Endocrine: Negative for cold intolerance and heat intolerance.   Genitourinary: Negative for dysuria and flank pain.   Neurological: Negative for dizziness and numbness.   Psychiatric/Behavioral: Negative for behavioral problems and confusion.     Objective:     Vital Signs (Most Recent):  Temp: 98.5 °F (36.9 °C) (04/29/17 0400)  Pulse: 87 (04/29/17 0400)  Resp: 18 (04/29/17 0400)  BP: (!) 181/99 (04/29/17 0400)  SpO2: 97 % (04/29/17 0400) Vital Signs (24h Range):  Temp:  [98.2 °F (36.8 °C)-99.6 °F (37.6 °C)] 98.5 °F (36.9 °C)  Pulse:  [82-94] 87  Resp:  [18-20] 18  SpO2:  [92 %-99 %] 97 %  BP: (131-194)/() 181/99     Weight: 136.1 kg (300 lb)  Body mass index is 54.87 kg/(m^2).    Intake/Output Summary (Last 24 hours) at 04/29/17 0747  Last data filed at 04/28/17 1800   Gross per 24 hour   Intake              850 ml   Output                0 ml   Net              850 ml      Physical Exam   Constitutional: She is oriented to person, place, and time. She appears well-developed and well-nourished.   HENT:   Head: Normocephalic and atraumatic.   Eyes: Conjunctivae are normal. Pupils are equal, round, and reactive to light.   Neck: Normal range of motion. No thyromegaly present.   Cardiovascular: Normal rate, regular rhythm and normal heart sounds.    Pulmonary/Chest: No respiratory distress. She has no wheezes.   Abdominal: Soft. She exhibits no distension. There is no tenderness.   Musculoskeletal: Normal range of motion. She exhibits no edema.   Neurological: She is alert and oriented to person, place, and time.   Skin: No erythema. No pallor.       Significant Labs:   CBC:     Recent Labs  Lab 04/28/17  0401 04/29/17  0521   WBC 26.25*  --    HGB 9.5* 9.8*   HCT 30.2* 28.5*    235     CMP:     Recent Labs  Lab 04/28/17  0402  04/29/17  0521    138   K 4.5 4.3    102   CO2 25 24   * 146*   BUN 13 10   CREATININE 0.8 0.7   CALCIUM 8.8 8.7   PROT 7.3 7.4   ALBUMIN 3.3* 3.4*   BILITOT 0.3 0.5   ALKPHOS 104 135   AST 27 24   ALT 32 28   ANIONGAP 8 12   EGFRNONAA >60.0 >60.0       Significant Imaging:   CXR- no acute process    Assessment/Plan:        # Sickle Cell pain crisis  # opioid dependence  - pt having complaints typical of her pain crisis.  - MRI of the right HIP did not reveal any evidence of avascular necrosis  - Dilaudid 3mg IV q3 prn; adding fentanyl patch today  - Seen by Hematology appreciate recs; pain currently well controlled      # Asthma exacerbation  # Acute respiratory failure with hypoxia  - improving   - switching to PO prednisone 40 mg today  - will start advair; needs pulmonology follow up  - will also add mucinex      # Sepsis due to suspected Strep PNA  # Multi-focal PNA  # Acute chest syndrome  - switching to Vanc and Unasyn  - re-consulting heme/onc  - acapella every 4 hrs PRN           #Bronchitis  Duonebs PRN  Start Prednisone 50mg daily  Advair      # Benign Essential HTN  - continue home Norvasc  - uncontrolled; coreg 25 mg PO BID   - started on lisinopril 40 mg daily ; prn hydralazine       # RLE swelling and pain  - BLE ultrasound negative for DVT  MRI of the Hip revealed periostitis, negative for Avascular necrosis      # Morbid Obesity  Body mass index is 54.87 kg/(m^2).       PT/OT          DVT/GI ppx  - Hep SQ  - regular diet         Discharge Disposition- likely Monday or Tuesday with oxygen      VTE Risk Mitigation         Ordered     heparin (porcine) injection 5,000 Units  Every 8 hours     Route:  Subcutaneous        04/16/17 0813     Medium Risk of VTE  Once      04/16/17 0813     Place sequential compression device  Until discontinued      04/16/17 0813     Place JOSE LUIS hose  Until discontinued      04/16/17 0813          Raymond Palacio MD  Department of Hospital Medicine   [facility]

## 2017-04-29 NOTE — SUBJECTIVE & OBJECTIVE
Interval History: patient had a CT chest today and shows multi-focal PNA; anti-biotics have been broadened; ABG pending; patient has been having uncontrolled and pain and BPs all day; placed patient on a fentanyl patch and increased BP meds; will re-consult heme/onc    Review of Systems   Constitutional: Negative for chills and fever.   HENT: Negative for rhinorrhea and sore throat.    Eyes: Negative for pain and discharge.   Respiratory: Positive for cough and shortness of breath. Negative for wheezing.    Cardiovascular: Negative for chest pain and leg swelling.   Gastrointestinal: Negative for abdominal distention, abdominal pain, blood in stool, nausea and vomiting.   Endocrine: Negative for cold intolerance and heat intolerance.   Genitourinary: Negative for dysuria and flank pain.   Neurological: Negative for dizziness and numbness.   Psychiatric/Behavioral: Negative for behavioral problems and confusion.     Objective:     Vital Signs (Most Recent):  Temp: 98.5 °F (36.9 °C) (04/29/17 0400)  Pulse: 87 (04/29/17 0400)  Resp: 18 (04/29/17 0400)  BP: (!) 181/99 (04/29/17 0400)  SpO2: 97 % (04/29/17 0400) Vital Signs (24h Range):  Temp:  [98.2 °F (36.8 °C)-99.6 °F (37.6 °C)] 98.5 °F (36.9 °C)  Pulse:  [82-94] 87  Resp:  [18-20] 18  SpO2:  [92 %-99 %] 97 %  BP: (131-194)/() 181/99     Weight: 136.1 kg (300 lb)  Body mass index is 54.87 kg/(m^2).    Intake/Output Summary (Last 24 hours) at 04/29/17 0747  Last data filed at 04/28/17 1800   Gross per 24 hour   Intake              850 ml   Output                0 ml   Net              850 ml      Physical Exam   Constitutional: She is oriented to person, place, and time. She appears well-developed and well-nourished.   HENT:   Head: Normocephalic and atraumatic.   Eyes: Conjunctivae are normal. Pupils are equal, round, and reactive to light.   Neck: Normal range of motion. No thyromegaly present.   Cardiovascular: Normal rate, regular rhythm and normal heart  sounds.    Pulmonary/Chest: No respiratory distress. She has no wheezes.   Abdominal: Soft. She exhibits no distension. There is no tenderness.   Musculoskeletal: Normal range of motion. She exhibits no edema.   Neurological: She is alert and oriented to person, place, and time.   Skin: No erythema. No pallor.       Significant Labs:   CBC:     Recent Labs  Lab 04/28/17  0401 04/29/17  0521   WBC 26.25*  --    HGB 9.5* 9.8*   HCT 30.2* 28.5*    235     CMP:     Recent Labs  Lab 04/28/17  0403 04/29/17  0521    138   K 4.5 4.3    102   CO2 25 24   * 146*   BUN 13 10   CREATININE 0.8 0.7   CALCIUM 8.8 8.7   PROT 7.3 7.4   ALBUMIN 3.3* 3.4*   BILITOT 0.3 0.5   ALKPHOS 104 135   AST 27 24   ALT 32 28   ANIONGAP 8 12   EGFRNONAA >60.0 >60.0       Significant Imaging:   CXR- no acute process

## 2017-04-29 NOTE — SUBJECTIVE & OBJECTIVE
Interval History: patient is feeling better today; states her SOB and cough have improved    Review of Systems   Constitutional: Negative for chills and fever.   HENT: Negative for rhinorrhea and sore throat.    Eyes: Negative for pain and discharge.   Respiratory: Positive for cough and shortness of breath. Negative for wheezing.    Cardiovascular: Negative for chest pain and leg swelling.   Gastrointestinal: Negative for abdominal distention, abdominal pain, blood in stool, nausea and vomiting.   Endocrine: Negative for cold intolerance and heat intolerance.   Genitourinary: Negative for dysuria and flank pain.   Neurological: Negative for dizziness and numbness.   Psychiatric/Behavioral: Negative for behavioral problems and confusion.     Objective:     Vital Signs (Most Recent):  Temp: 98.8 °F (37.1 °C) (04/29/17 1126)  Pulse: 84 (04/29/17 1126)  Resp: 20 (04/29/17 1126)  BP: 135/87 (04/29/17 1126)  SpO2: 98 % (04/29/17 1126) Vital Signs (24h Range):  Temp:  [98.2 °F (36.8 °C)-99.6 °F (37.6 °C)] 98.8 °F (37.1 °C)  Pulse:  [82-92] 84  Resp:  [18-24] 20  SpO2:  [92 %-99 %] 98 %  BP: (131-192)/() 135/87     Weight: 136.1 kg (300 lb)  Body mass index is 54.87 kg/(m^2).    Intake/Output Summary (Last 24 hours) at 04/29/17 1254  Last data filed at 04/28/17 1800   Gross per 24 hour   Intake              850 ml   Output                0 ml   Net              850 ml      Physical Exam   Constitutional: She is oriented to person, place, and time. She appears well-developed and well-nourished.   HENT:   Head: Normocephalic and atraumatic.   Eyes: Conjunctivae are normal. Pupils are equal, round, and reactive to light.   Neck: Normal range of motion. No thyromegaly present.   Cardiovascular: Normal rate, regular rhythm and normal heart sounds.    Pulmonary/Chest: No respiratory distress. She has no wheezes.   Abdominal: Soft. She exhibits no distension. There is no tenderness.   Musculoskeletal: Normal range of  motion. She exhibits no edema.   Neurological: She is alert and oriented to person, place, and time.   Skin: No erythema. No pallor.       Significant Labs:   CBC:     Recent Labs  Lab 04/28/17  0401 04/29/17  0521   WBC 26.25* 20.29*   HGB 9.5* 9.8*   HCT 30.2* 28.5*    235     CMP:     Recent Labs  Lab 04/28/17  0403 04/29/17  0521    138   K 4.5 4.3    102   CO2 25 24   * 146*   BUN 13 10   CREATININE 0.8 0.7   CALCIUM 8.8 8.7   PROT 7.3 7.4   ALBUMIN 3.3* 3.4*   BILITOT 0.3 0.5   ALKPHOS 104 135   AST 27 24   ALT 32 28   ANIONGAP 8 12   EGFRNONAA >60.0 >60.0       Significant Imaging:   CXR- no acute process

## 2017-04-29 NOTE — CONSULTS
"CONSULT NOTE  Hematology/Oncology        Consult Requested By: Raymond Palacio MD  Reason for Consult: "re-consult for acute syndrome, hypoxia; worsening sickle cell crisis"    SUBJECTIVE:     History of Present Illness:  Patient is a 37 y.o. female with PMHx of HTN, Sickle Cell/Beta Thalassemia, morbid obesity, trigeminal neuralgia who was admitted 4/16/17 for pneumonia and sickle cell pain crisis. She was evaluated by our service on 4/21/17 and felt not to be in acute chest syndrome. She has had a slow recovery, prompting CT chest showing multifocal pneumonia. Heme/Onc re-consulted for worsening sick cell crisis. On exam today, patient initially appeared lethargic and drowsy. On further prompting, her mentation improved. Patient reports much improvement with regards to her pains; she states her pain regimen is well optimized. She denies any hip pain, which is usually where her sickle cell pain is the worst. She is currently on room and and denies feeling shortness of breath.     Continuous Infusions:   sodium chloride 0.9% 100 mL/hr at 04/27/17 1039     Scheduled Meds:   albuterol-ipratropium 2.5mg-0.5mg/3mL  3 mL Nebulization Q4H    amlodipine  10 mg Oral Daily    ampicillin-sulbactim (UNASYN) IVPB  3 g Intravenous Q6H    baclofen  10 mg Oral BID    carbamazepine  400 mg Oral BID    carvedilol  25 mg Oral BID    cetirizine  5 mg Oral Daily    diphenhydrAMINE  50 mg Intravenous Once    fentaNYL  1 patch Transdermal Q72H    fluoxetine  40 mg Oral Daily    fluticasone  2 spray Each Nare Daily    fluticasone-vilanterol  1 puff Inhalation Daily    gabapentin  600 mg Oral BID    guaifenesin  600 mg Oral BID    heparin (porcine)  5,000 Units Subcutaneous Q8H    ibuprofen  400 mg Oral Q8H    lisinopril  40 mg Oral Daily    predniSONE  40 mg Oral Daily    senna-docusate 8.6-50 mg  1 tablet Oral BID    sodium chloride 0.9%  3 mL Intravenous Q8H    vancomycin (VANCOCIN) IVPB  15 mg/kg Intravenous Q12H "     PRN Meds:albuterol-ipratropium 2.5mg-0.5mg/3mL, benzonatate, diphenhydrAMINE, hydrALAZINE, HYDROmorphone, lactulose, naloxone, oxycodone, promethazine, promethazine-codeine 6.25-10 mg/5 ml, ramelteon    Past Medical History:   Diagnosis Date    Hypertension     Sickle cell-beta thalassemia disease with pain     Trigeminal neuralgia      Past Surgical History:   Procedure Laterality Date     SECTION      TUBAL LIGATION       History reviewed. No pertinent family history.  Social History   Substance Use Topics    Smoking status: Never Smoker    Smokeless tobacco: None    Alcohol use None       Review of patient's allergies indicates:   Allergen Reactions    Morphine Itching        Review of Systems:  Constitutional: no fever or chills  Eyes: no visual changes  ENT: no nasal congestion or sore throat  Respiratory: cough improved, no shortness of breath  Cardiovascular: no chest pain or palpitations  Gastrointestinal: no nausea or vomiting, no abdominal pain or change in bowel habits  Genitourinary: no hematuria or dysuria  Integument/Breast: no rash or pruritis  Hematologic/Lymphatic: no easy bruising or lymphadenopathy  Musculoskeletal: no arthralgias or myalgias  Neurological: no seizures or tremors  Behavioral/Psych: no auditory or visual hallucinations    OBJECTIVE:     Vital Signs (Most Recent)  Temp: 98.8 °F (37.1 °C) (17 1126)  Pulse: 84 (17 1126)  Resp: 20 (17 112)  BP: 135/87 (17 1126)  SpO2: 98 % (17 112)    Pain Assessment: No pain reported at this time    Vital Signs Range (Last 24H):  Temp:  [98.2 °F (36.8 °C)-99.6 °F (37.6 °C)]   Pulse:  [82-92]   Resp:  [18-24]   BP: (131-192)/()   SpO2:  [92 %-99 %]     Physical Exam:  General: well developed, well nourished, morbidly obese  Eyes: conjunctivae/corneas clear. PERRL..  HENT: Head:normocephalic, atraumatic. Ears:right ear normal, left ear normal. Nose: Nares normal. Septum midline. Mucosa normal.  No drainage or sinus tenderness., no discharge. Throat: lips, mucosa, and tongue normal; teeth and gums normal and no throat erythema.  Neck: supple, symmetrical, trachea midline, no JVD and thyroid not enlarged, symmetric, no tenderness/mass/nodules  Lungs:  clear to auscultation bilaterally and normal respiratory effort  Cardiovascular: Heart: regular rate and rhythm, S1, S2 normal, no murmur, click, rub or gallop. Chest Wall: no tenderness. Extremities: no cyanosis or edema, or clubbing. Pulses: 2+ and symmetric.  Breasts:  deferred  Abdomen/Rectal: Abdomen: soft, non-tender non-distented; bowel sounds normal; no masses,  no organomegaly. Rectal: not examined  Pelvic:  deferred  Skin: Skin color, texture, turgor normal. No rashes or lesions  Musculoskeletal:no clubbing, cyanosis  Neurologic: Mental status: Initially very drowsy but once awaked, pt alert, oriented, thought content appropriate  Psych/Behavioral:  Normal.    Laboratory:  CBC with Differential:    Recent Labs  Lab 04/29/17  0521   WBC 20.29*   LYMPH 16.0*  CANCELED   BASOPHIL 0.0   RBC 4.19   HCT 28.5*   HGB 9.8*   MCV 68*   MCH 23.4*   RDW 20.8*      MPV 9.1*     CMP:    Recent Labs  Lab 04/29/17  0521   *   CALCIUM 8.7   ALBUMIN 3.4*   PROT 7.4      K 4.3   CO2 24      BUN 10   CREATININE 0.7   ALKPHOS 135   ALT 28   AST 24   BILITOT 0.5     ABG    Recent Labs  Lab 04/29/17  1007   PH 7.390   PO2 78*   PCO2 56.0*   HCO3 33.9*   BE 9       Diagnostic Results:    CT Chest 4/28/17:  Multifocal subsegmental areas of patchy consolidation and groundglass attenuation within both lower lobes and the left upper lobe. Findings are most concerning for multifocal pneumonia with differential considerations including pulmonary hemorrhage and noninfectious inflammatory processes.  Recommend followup study in 4-6 weeks to ensure resolution.    Additional findings include:  - Irregular contour and abnormal attenuation of the spleen  concerning for sequela of remote infarcts/evolving autosplenectomy.  - Mild body wall edema.    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        Ms. Nazanin Malone is a 37 y.o. female with PMHx of HTN, Sickle Cell/Beta Thalassemia, morbid obesity, trigeminal neuralgia who was admitted 4/16/17 for pneumonia and sickle cell pain crisis.     Problem Assessment & Recommendations:    # Sickle Cell / Beta Thalassemia   - follows with Dr. Montgomery in clinic  - currently denies chest pain or SOB  - findings on CT likely related to her pneumonia  - does not appear to be in acute chest at this time  - continue pain management per primary team (appears well controlled)  - she should follow up in Hematology Clinic (will help schedule appointment)    Thank you for allowing us to participate in the care of this patient.   Please do not hesitate to call with any questions.   We will sign off. Please re-consult as needed    Darrel Castro MD (PGY-4)  Hematology/Oncology Fellow  Will discuss with Dr. Luna (Hematology/Oncology Staff)      Attending Addendum:  The patient was seen, examined, and discussed on rounds with the team.  I agree with the assessment and plan as outlined for Nazanin Malone.  Patient currently pain free and not hypoxic.  Continue to monitor with low  threshold for red cell exchange should she deteriorate.      Hung Luna DO, FACP  Hematology & Oncology  [address] [place] [address]  ph. [phone]  Fax. [phone]

## 2017-04-29 NOTE — PROGRESS NOTES
"[facility] Hospital Medicine  Progress Note    Patient Name: Nazanin Malone  MRN: [id number]  Patient Class: IP- Inpatient   Admission Date: 4/16/2017  Length of Stay: 13 days  Attending Physician: Raymond Palacio MD  Primary Care Provider: Primary Doctor [facility] Medicine Team: [facility] A Raymond Palacio MD    Subjective:     Principal Problem:Sickle-cell disease with pain    HPI:  Nazanin Malone is a 37 y.o. female who  has a PMH of Hypertension; Sickle cell-beta thalassemia disease with pain; and Trigeminal neuralgia. The patient presented to [facility] on 4/16/2017 due to worsening right leg pain over the last week as well as a cough productive of green mucous. She states she came to ED 2 days ago for her cough and was prescribed Augmentin. She says the mucous is no longer green, but she still feels all "stuffed up and congested." She has had to be admitted a few other times due to sickle pain crisis, but rarely needs transfusions. Persistently asking for more pain medications,     Hospital Course:  4/25- audible wheezing; afebrile; switched to solumedrol    4/26/17- patient is no longer wheezing and feels much better today    4/27/17- states cough is still bothering here and leads to pain; still requiring oxygen and will likely need to go home on that;     4/28- CT chest shows multifocal PNA; concerning for acute chest syndrome, abg, re-consult heme/onc    4/29- patient feeling better today; currently off oxygen      Interval History: patient is feeling better today; states her SOB and cough have improved    Review of Systems   Constitutional: Negative for chills and fever.   HENT: Negative for rhinorrhea and sore throat.    Eyes: Negative for pain and discharge.   Respiratory: Positive for cough and shortness of breath. Negative for wheezing.    Cardiovascular: Negative for chest pain and leg swelling.   Gastrointestinal: Negative for abdominal distention, abdominal " pain, blood in stool, nausea and vomiting.   Endocrine: Negative for cold intolerance and heat intolerance.   Genitourinary: Negative for dysuria and flank pain.   Neurological: Negative for dizziness and numbness.   Psychiatric/Behavioral: Negative for behavioral problems and confusion.     Objective:     Vital Signs (Most Recent):  Temp: 98.8 °F (37.1 °C) (04/29/17 1126)  Pulse: 84 (04/29/17 1126)  Resp: 20 (04/29/17 1126)  BP: 135/87 (04/29/17 1126)  SpO2: 98 % (04/29/17 1126) Vital Signs (24h Range):  Temp:  [98.2 °F (36.8 °C)-99.6 °F (37.6 °C)] 98.8 °F (37.1 °C)  Pulse:  [82-92] 84  Resp:  [18-24] 20  SpO2:  [92 %-99 %] 98 %  BP: (131-192)/() 135/87     Weight: 136.1 kg (300 lb)  Body mass index is 54.87 kg/(m^2).    Intake/Output Summary (Last 24 hours) at 04/29/17 1254  Last data filed at 04/28/17 1800   Gross per 24 hour   Intake              850 ml   Output                0 ml   Net              850 ml      Physical Exam   Constitutional: She is oriented to person, place, and time. She appears well-developed and well-nourished.   HENT:   Head: Normocephalic and atraumatic.   Eyes: Conjunctivae are normal. Pupils are equal, round, and reactive to light.   Neck: Normal range of motion. No thyromegaly present.   Cardiovascular: Normal rate, regular rhythm and normal heart sounds.    Pulmonary/Chest: No respiratory distress. She has no wheezes.   Abdominal: Soft. She exhibits no distension. There is no tenderness.   Musculoskeletal: Normal range of motion. She exhibits no edema.   Neurological: She is alert and oriented to person, place, and time.   Skin: No erythema. No pallor.       Significant Labs:   CBC:     Recent Labs  Lab 04/28/17  0401 04/29/17  0521   WBC 26.25* 20.29*   HGB 9.5* 9.8*   HCT 30.2* 28.5*    235     CMP:     Recent Labs  Lab 04/28/17  0403 04/29/17  0521    138   K 4.5 4.3    102   CO2 25 24   * 146*   BUN 13 10   CREATININE 0.8 0.7   CALCIUM 8.8 8.7    PROT 7.3 7.4   ALBUMIN 3.3* 3.4*   BILITOT 0.3 0.5   ALKPHOS 104 135   AST 27 24   ALT 32 28   ANIONGAP 8 12   EGFRNONAA >60.0 >60.0       Significant Imaging:   CXR- no acute process    Assessment/Plan:        # Sickle Cell pain crisis  # opioid dependence  - pt having complaints typical of her pain crisis.  - MRI of the right HIP did not reveal any evidence of avascular necrosis  - Dilaudid 3mg IV q3 prn; adding fentanyl patch today  - Seen by Hematology appreciate recs; pain currently well controlled      # Asthma exacerbation  # Acute respiratory failure with hypoxia  - improving   - switching to PO prednisone 40 mg today  - will start advair; needs pulmonology follow up  - will also add mucinex      # Sepsis due to suspected Strep PNA  # Multi-focal PNA   Vanc and Unasyn  - acapella every 4 hrs PRN           #Bronchitis  Duonebs PRN  Start Prednisone 50mg daily  Advair      # Benign Essential HTN  - continue home Norvasc  -  coreg 25 mg PO BID   - started on lisinopril 40 mg daily ; prn hydralazine       # RLE swelling and pain  - BLE ultrasound negative for DVT  MRI of the Hip revealed periostitis, negative for Avascular necrosis      # Morbid Obesity  Body mass index is 54.87 kg/(m^2).       PT/OT          DVT/GI ppx  - Hep SQ  - regular diet           Discharge Disposition- likely Monday       VTE Risk Mitigation         Ordered     heparin (porcine) injection 5,000 Units  Every 8 hours     Route:  Subcutaneous        04/16/17 0813     Medium Risk of VTE  Once      04/16/17 0813     Place sequential compression device  Until discontinued      04/16/17 0813     Place JOSE LUIS hose  Until discontinued      04/16/17 0813          Raymond Palacio MD  Department of Hospital Medicine   [facility]

## 2017-04-30 LAB
ALBUMIN SERPL BCP-MCNC: 3.1 G/DL
ALP SERPL-CCNC: 150 U/L
ALT SERPL W/O P-5'-P-CCNC: 25 U/L
ANION GAP SERPL CALC-SCNC: 7 MMOL/L
ANISOCYTOSIS BLD QL SMEAR: SLIGHT
AST SERPL-CCNC: 18 U/L
BACTERIA UR CULT: NO GROWTH
BASOPHILS # BLD AUTO: ABNORMAL K/UL
BASOPHILS NFR BLD: 0 %
BILIRUB SERPL-MCNC: 0.5 MG/DL
BUN SERPL-MCNC: 11 MG/DL
CALCIUM SERPL-MCNC: 8.4 MG/DL
CHLORIDE SERPL-SCNC: 103 MMOL/L
CO2 SERPL-SCNC: 31 MMOL/L
CREAT SERPL-MCNC: 0.7 MG/DL
DIFFERENTIAL METHOD: ABNORMAL
EOSINOPHIL # BLD AUTO: ABNORMAL K/UL
EOSINOPHIL NFR BLD: 3 %
ERYTHROCYTE [DISTWIDTH] IN BLOOD BY AUTOMATED COUNT: 21.3 %
EST. GFR  (AFRICAN AMERICAN): >60 ML/MIN/1.73 M^2
EST. GFR  (NON AFRICAN AMERICAN): >60 ML/MIN/1.73 M^2
GLUCOSE SERPL-MCNC: 107 MG/DL
HCT VFR BLD AUTO: 30 %
HGB BLD-MCNC: 10 G/DL
HYPOCHROMIA BLD QL SMEAR: ABNORMAL
LYMPHOCYTES # BLD AUTO: ABNORMAL K/UL
LYMPHOCYTES NFR BLD: 30 %
MAGNESIUM SERPL-MCNC: 1.9 MG/DL
MCH RBC QN AUTO: 23.4 PG
MCHC RBC AUTO-ENTMCNC: 33.3 %
MCV RBC AUTO: 70 FL
METAMYELOCYTES NFR BLD MANUAL: 1 %
MONOCYTES # BLD AUTO: ABNORMAL K/UL
MONOCYTES NFR BLD: 7 %
MYELOCYTES NFR BLD MANUAL: 1 %
NEUTROPHILS NFR BLD: 58 %
NRBC BLD-RTO: ABNORMAL /100 WBC
OVALOCYTES BLD QL SMEAR: ABNORMAL
PHOSPHATE SERPL-MCNC: 3.9 MG/DL
PLATELET # BLD AUTO: 188 K/UL
PLATELET BLD QL SMEAR: ABNORMAL
PMV BLD AUTO: 8.7 FL
POIKILOCYTOSIS BLD QL SMEAR: SLIGHT
POLYCHROMASIA BLD QL SMEAR: ABNORMAL
POTASSIUM SERPL-SCNC: 3.8 MMOL/L
PROT SERPL-MCNC: 6.8 G/DL
RBC # BLD AUTO: 4.28 M/UL
RETICS/RBC NFR AUTO: 6 %
SODIUM SERPL-SCNC: 141 MMOL/L
TARGETS BLD QL SMEAR: ABNORMAL
WBC # BLD AUTO: 17.27 K/UL
WBC NRBC COR # BLD: 13.39 K/UL

## 2017-04-30 PROCEDURE — 11000001 HC ACUTE MED/SURG PRIVATE ROOM

## 2017-04-30 PROCEDURE — 25000003 PHARM REV CODE 250: Performed by: PHYSICIAN ASSISTANT

## 2017-04-30 PROCEDURE — 83735 ASSAY OF MAGNESIUM: CPT

## 2017-04-30 PROCEDURE — 25000242 PHARM REV CODE 250 ALT 637 W/ HCPCS: Performed by: NURSE PRACTITIONER

## 2017-04-30 PROCEDURE — 63600175 PHARM REV CODE 636 W HCPCS

## 2017-04-30 PROCEDURE — 25000003 PHARM REV CODE 250: Performed by: HOSPITALIST

## 2017-04-30 PROCEDURE — 80053 COMPREHEN METABOLIC PANEL: CPT

## 2017-04-30 PROCEDURE — 63600175 PHARM REV CODE 636 W HCPCS: Performed by: HOSPITALIST

## 2017-04-30 PROCEDURE — 85027 COMPLETE CBC AUTOMATED: CPT

## 2017-04-30 PROCEDURE — 36415 COLL VENOUS BLD VENIPUNCTURE: CPT

## 2017-04-30 PROCEDURE — 84100 ASSAY OF PHOSPHORUS: CPT

## 2017-04-30 PROCEDURE — 99900035 HC TECH TIME PER 15 MIN (STAT)

## 2017-04-30 PROCEDURE — 94640 AIRWAY INHALATION TREATMENT: CPT

## 2017-04-30 PROCEDURE — 94760 N-INVAS EAR/PLS OXIMETRY 1: CPT

## 2017-04-30 PROCEDURE — 99233 SBSQ HOSP IP/OBS HIGH 50: CPT | Mod: ,,, | Performed by: HOSPITALIST

## 2017-04-30 PROCEDURE — 85007 BL SMEAR W/DIFF WBC COUNT: CPT

## 2017-04-30 PROCEDURE — 85045 AUTOMATED RETICULOCYTE COUNT: CPT

## 2017-04-30 RX ORDER — DIPHENHYDRAMINE HYDROCHLORIDE 50 MG/ML
50 INJECTION INTRAMUSCULAR; INTRAVENOUS ONCE
Status: COMPLETED | OUTPATIENT
Start: 2017-04-30 | End: 2017-04-30

## 2017-04-30 RX ORDER — DIPHENHYDRAMINE HYDROCHLORIDE 50 MG/ML
INJECTION INTRAMUSCULAR; INTRAVENOUS
Status: COMPLETED
Start: 2017-04-30 | End: 2017-04-30

## 2017-04-30 RX ORDER — HYDRALAZINE HYDROCHLORIDE 50 MG/1
100 TABLET, FILM COATED ORAL EVERY 8 HOURS
Status: DISCONTINUED | OUTPATIENT
Start: 2017-04-30 | End: 2017-05-05 | Stop reason: HOSPADM

## 2017-04-30 RX ORDER — DIPHENHYDRAMINE HYDROCHLORIDE 50 MG/ML
50 INJECTION INTRAMUSCULAR; INTRAVENOUS ONCE
Status: DISCONTINUED | OUTPATIENT
Start: 2017-04-30 | End: 2017-05-05 | Stop reason: HOSPADM

## 2017-04-30 RX ADMIN — AMPICILLIN SODIUM AND SULBACTAM SODIUM 3 G: 2; 1 INJECTION, POWDER, FOR SOLUTION INTRAMUSCULAR; INTRAVENOUS at 05:04

## 2017-04-30 RX ADMIN — HYDROMORPHONE HYDROCHLORIDE 3 MG: 1 INJECTION, SOLUTION INTRAMUSCULAR; INTRAVENOUS; SUBCUTANEOUS at 09:04

## 2017-04-30 RX ADMIN — OXYCODONE HYDROCHLORIDE 30 MG: 5 TABLET ORAL at 03:04

## 2017-04-30 RX ADMIN — STANDARDIZED SENNA CONCENTRATE AND DOCUSATE SODIUM 1 TABLET: 8.6; 5 TABLET, FILM COATED ORAL at 09:04

## 2017-04-30 RX ADMIN — BACLOFEN 10 MG: 10 TABLET ORAL at 09:04

## 2017-04-30 RX ADMIN — Medication 3 ML: at 02:04

## 2017-04-30 RX ADMIN — HYDROMORPHONE HYDROCHLORIDE 3 MG: 1 INJECTION, SOLUTION INTRAMUSCULAR; INTRAVENOUS; SUBCUTANEOUS at 06:04

## 2017-04-30 RX ADMIN — OXYCODONE HYDROCHLORIDE 30 MG: 5 TABLET ORAL at 05:04

## 2017-04-30 RX ADMIN — FLUTICASONE PROPIONATE 2 SPRAY: 50 SPRAY, METERED NASAL at 09:04

## 2017-04-30 RX ADMIN — HEPARIN SODIUM 5000 UNITS: 5000 INJECTION, SOLUTION INTRAVENOUS; SUBCUTANEOUS at 05:04

## 2017-04-30 RX ADMIN — PROMETHAZINE HYDROCHLORIDE 12.5 MG: 12.5 TABLET ORAL at 12:04

## 2017-04-30 RX ADMIN — VANCOMYCIN HYDROCHLORIDE 2000 MG: 1 INJECTION, POWDER, LYOPHILIZED, FOR SOLUTION INTRAVENOUS at 03:04

## 2017-04-30 RX ADMIN — HYDRALAZINE HYDROCHLORIDE 100 MG: 50 TABLET ORAL at 04:04

## 2017-04-30 RX ADMIN — Medication: at 09:04

## 2017-04-30 RX ADMIN — HEPARIN SODIUM 5000 UNITS: 5000 INJECTION, SOLUTION INTRAVENOUS; SUBCUTANEOUS at 09:04

## 2017-04-30 RX ADMIN — HYDROMORPHONE HYDROCHLORIDE 3 MG: 1 INJECTION, SOLUTION INTRAMUSCULAR; INTRAVENOUS; SUBCUTANEOUS at 03:04

## 2017-04-30 RX ADMIN — Medication 10 ML: at 05:04

## 2017-04-30 RX ADMIN — PANTOPRAZOLE SODIUM 600 MG: 40 TABLET, DELAYED RELEASE ORAL at 09:04

## 2017-04-30 RX ADMIN — PROMETHAZINE HYDROCHLORIDE 12.5 MG: 12.5 TABLET ORAL at 05:04

## 2017-04-30 RX ADMIN — CARBAMAZEPINE 400 MG: 200 TABLET ORAL at 09:04

## 2017-04-30 RX ADMIN — IPRATROPIUM BROMIDE AND ALBUTEROL SULFATE 3 ML: .5; 3 SOLUTION RESPIRATORY (INHALATION) at 02:04

## 2017-04-30 RX ADMIN — DIPHENHYDRAMINE HYDROCHLORIDE 25 MG: 25 CAPSULE ORAL at 09:04

## 2017-04-30 RX ADMIN — GABAPENTIN 600 MG: 300 CAPSULE ORAL at 09:04

## 2017-04-30 RX ADMIN — CARVEDILOL 25 MG: 25 TABLET, FILM COATED ORAL at 09:04

## 2017-04-30 RX ADMIN — HYDRALAZINE HYDROCHLORIDE 100 MG: 50 TABLET ORAL at 09:04

## 2017-04-30 RX ADMIN — DIPHENHYDRAMINE HYDROCHLORIDE 25 MG: 25 CAPSULE ORAL at 12:04

## 2017-04-30 RX ADMIN — VANCOMYCIN HYDROCHLORIDE 2000 MG: 1 INJECTION, POWDER, LYOPHILIZED, FOR SOLUTION INTRAVENOUS at 06:04

## 2017-04-30 RX ADMIN — IPRATROPIUM BROMIDE AND ALBUTEROL SULFATE 3 ML: .5; 3 SOLUTION RESPIRATORY (INHALATION) at 05:04

## 2017-04-30 RX ADMIN — IPRATROPIUM BROMIDE AND ALBUTEROL SULFATE 3 ML: .5; 3 SOLUTION RESPIRATORY (INHALATION) at 08:04

## 2017-04-30 RX ADMIN — RAMELTEON 8 MG: 8 TABLET, FILM COATED ORAL at 12:04

## 2017-04-30 RX ADMIN — IPRATROPIUM BROMIDE AND ALBUTEROL SULFATE 3 ML: .5; 3 SOLUTION RESPIRATORY (INHALATION) at 12:04

## 2017-04-30 RX ADMIN — FLUOXETINE 40 MG: 20 CAPSULE ORAL at 09:04

## 2017-04-30 RX ADMIN — DIPHENHYDRAMINE HYDROCHLORIDE 50 MG: 50 INJECTION, SOLUTION INTRAMUSCULAR; INTRAVENOUS at 03:04

## 2017-04-30 RX ADMIN — AMLODIPINE BESYLATE 10 MG: 10 TABLET ORAL at 09:04

## 2017-04-30 RX ADMIN — IBUPROFEN 400 MG: 400 TABLET, FILM COATED ORAL at 02:04

## 2017-04-30 RX ADMIN — BENZONATATE 100 MG: 100 CAPSULE ORAL at 09:04

## 2017-04-30 RX ADMIN — CETIRIZINE HYDROCHLORIDE 5 MG: 5 TABLET, FILM COATED ORAL at 09:04

## 2017-04-30 RX ADMIN — OXYCODONE HYDROCHLORIDE 30 MG: 5 TABLET ORAL at 01:04

## 2017-04-30 RX ADMIN — IPRATROPIUM BROMIDE AND ALBUTEROL SULFATE 3 ML: .5; 3 SOLUTION RESPIRATORY (INHALATION) at 03:04

## 2017-04-30 RX ADMIN — AMPICILLIN SODIUM AND SULBACTAM SODIUM 3 G: 2; 1 INJECTION, POWDER, FOR SOLUTION INTRAMUSCULAR; INTRAVENOUS at 09:04

## 2017-04-30 RX ADMIN — DIPHENHYDRAMINE HYDROCHLORIDE 50 MG: 50 INJECTION, SOLUTION INTRAMUSCULAR; INTRAVENOUS at 09:04

## 2017-04-30 RX ADMIN — HYDRALAZINE HYDROCHLORIDE 100 MG: 50 TABLET ORAL at 05:04

## 2017-04-30 RX ADMIN — LISINOPRIL 40 MG: 20 TABLET ORAL at 09:04

## 2017-04-30 RX ADMIN — PROMETHAZINE HYDROCHLORIDE AND CODEINE PHOSPHATE 5 ML: 6.25; 1 SYRUP ORAL at 09:04

## 2017-04-30 RX ADMIN — AMPICILLIN SODIUM AND SULBACTAM SODIUM 3 G: 2; 1 INJECTION, POWDER, FOR SOLUTION INTRAMUSCULAR; INTRAVENOUS at 02:04

## 2017-04-30 RX ADMIN — OXYCODONE HYDROCHLORIDE 30 MG: 5 TABLET ORAL at 02:04

## 2017-04-30 RX ADMIN — OXYCODONE HYDROCHLORIDE 30 MG: 5 TABLET ORAL at 08:04

## 2017-04-30 RX ADMIN — HEPARIN SODIUM 5000 UNITS: 5000 INJECTION, SOLUTION INTRAVENOUS; SUBCUTANEOUS at 02:04

## 2017-04-30 RX ADMIN — IBUPROFEN 400 MG: 400 TABLET, FILM COATED ORAL at 05:04

## 2017-04-30 RX ADMIN — HYDROMORPHONE HYDROCHLORIDE 3 MG: 1 INJECTION, SOLUTION INTRAMUSCULAR; INTRAVENOUS; SUBCUTANEOUS at 12:04

## 2017-04-30 RX ADMIN — BENZONATATE 100 MG: 100 CAPSULE ORAL at 10:04

## 2017-04-30 RX ADMIN — IBUPROFEN 400 MG: 400 TABLET, FILM COATED ORAL at 09:04

## 2017-04-30 RX ADMIN — PREDNISONE 40 MG: 20 TABLET ORAL at 09:04

## 2017-04-30 RX ADMIN — FLUTICASONE FUROATE AND VILANTEROL TRIFENATATE 1 PUFF: 100; 25 POWDER RESPIRATORY (INHALATION) at 09:04

## 2017-04-30 NOTE — PROGRESS NOTES
Spoke with on call NP. Pt's b/p elevated and offered the prn hydralazine-pt does not want to take it as she is taking all her bedtime meds.

## 2017-04-30 NOTE — PLAN OF CARE
Problem: Patient Care Overview  Goal: Plan of Care Review  Outcome: Ongoing (interventions implemented as appropriate)  Patient remains free from falls. Bed in low position, wheels locked, X2 side rails up. Dr Palacio paged regarding pts increase in temp to 100.0. She stated when her pressure was high her left extremities were less mobile. Hydralazine added to her routine medication, instead of PRN. Around the clock pain medication administered with mild relief. BP and temp now stable. Will continue to monitor.

## 2017-04-30 NOTE — PLAN OF CARE
Problem: Patient Care Overview  Goal: Plan of Care Review  Outcome: Ongoing (interventions implemented as appropriate)  Alert/oreinted. Skin intact. Up ad sumi. Problems w/ adequate pain control. bp very elevated. Obese. Lungs coarse breathe sounds. resp txs atc. Afebrile-receiving iv antibiotics. No falls/trauma this shift.

## 2017-04-30 NOTE — PLAN OF CARE
Problem: Pain, Acute (Adult)  Goal: Identify Related Risk Factors and Signs and Symptoms  Related risk factors and signs and symptoms are identified upon initiation of Human Response Clinical Practice Guideline (CPG)   Outcome: Ongoing (interventions implemented as appropriate)  Reports that pain management is not at adequate level. Alternating po and iv pain meds with minimal to no relief reported.

## 2017-05-01 LAB
ALBUMIN SERPL BCP-MCNC: 2.8 G/DL
ALP SERPL-CCNC: 147 U/L
ALT SERPL W/O P-5'-P-CCNC: 18 U/L
ANION GAP SERPL CALC-SCNC: 9 MMOL/L
ANISOCYTOSIS BLD QL SMEAR: SLIGHT
AST SERPL-CCNC: 18 U/L
BASOPHILS # BLD AUTO: ABNORMAL K/UL
BASOPHILS NFR BLD: 0 %
BILIRUB SERPL-MCNC: 0.7 MG/DL
BUN SERPL-MCNC: 10 MG/DL
CALCIUM SERPL-MCNC: 8.6 MG/DL
CHLORIDE SERPL-SCNC: 100 MMOL/L
CO2 SERPL-SCNC: 29 MMOL/L
CREAT SERPL-MCNC: 0.7 MG/DL
DACRYOCYTES BLD QL SMEAR: ABNORMAL
DIFFERENTIAL METHOD: ABNORMAL
EOSINOPHIL # BLD AUTO: ABNORMAL K/UL
EOSINOPHIL NFR BLD: 4 %
ERYTHROCYTE [DISTWIDTH] IN BLOOD BY AUTOMATED COUNT: 21.6 %
EST. GFR  (AFRICAN AMERICAN): >60 ML/MIN/1.73 M^2
EST. GFR  (NON AFRICAN AMERICAN): >60 ML/MIN/1.73 M^2
GIANT PLATELETS BLD QL SMEAR: PRESENT
GLUCOSE SERPL-MCNC: 97 MG/DL
HCT VFR BLD AUTO: 30.6 %
HGB BLD-MCNC: 10 G/DL
HYPOCHROMIA BLD QL SMEAR: ABNORMAL
LYMPHOCYTES # BLD AUTO: ABNORMAL K/UL
LYMPHOCYTES NFR BLD: 30 %
MAGNESIUM SERPL-MCNC: 2.2 MG/DL
MCH RBC QN AUTO: 23.5 PG
MCHC RBC AUTO-ENTMCNC: 32.7 %
MCV RBC AUTO: 72 FL
MONOCYTES # BLD AUTO: ABNORMAL K/UL
MONOCYTES NFR BLD: 4 %
MYELOCYTES NFR BLD MANUAL: 2 %
NEUTROPHILS NFR BLD: 60 %
NRBC BLD-RTO: ABNORMAL /100 WBC
OVALOCYTES BLD QL SMEAR: ABNORMAL
PHOSPHATE SERPL-MCNC: 4.2 MG/DL
PLATELET # BLD AUTO: 175 K/UL
PLATELET BLD QL SMEAR: ABNORMAL
PMV BLD AUTO: 8.9 FL
POIKILOCYTOSIS BLD QL SMEAR: SLIGHT
POLYCHROMASIA BLD QL SMEAR: ABNORMAL
POTASSIUM SERPL-SCNC: 4 MMOL/L
PROT SERPL-MCNC: 6.4 G/DL
RBC # BLD AUTO: 4.26 M/UL
RETICS/RBC NFR AUTO: 6.4 %
SODIUM SERPL-SCNC: 138 MMOL/L
TARGETS BLD QL SMEAR: ABNORMAL
WBC # BLD AUTO: 15.12 K/UL
WBC NRBC COR # BLD: 9.33 K/UL

## 2017-05-01 PROCEDURE — 25000003 PHARM REV CODE 250: Performed by: PHYSICIAN ASSISTANT

## 2017-05-01 PROCEDURE — 94640 AIRWAY INHALATION TREATMENT: CPT

## 2017-05-01 PROCEDURE — A9585 GADOBUTROL INJECTION: HCPCS | Performed by: HOSPITALIST

## 2017-05-01 PROCEDURE — 84100 ASSAY OF PHOSPHORUS: CPT

## 2017-05-01 PROCEDURE — 63600175 PHARM REV CODE 636 W HCPCS: Performed by: HOSPITALIST

## 2017-05-01 PROCEDURE — C1751 CATH, INF, PER/CENT/MIDLINE: HCPCS

## 2017-05-01 PROCEDURE — 25000003 PHARM REV CODE 250: Performed by: HOSPITALIST

## 2017-05-01 PROCEDURE — 11000001 HC ACUTE MED/SURG PRIVATE ROOM

## 2017-05-01 PROCEDURE — 25500020 PHARM REV CODE 255: Performed by: HOSPITALIST

## 2017-05-01 PROCEDURE — 36415 COLL VENOUS BLD VENIPUNCTURE: CPT

## 2017-05-01 PROCEDURE — 27000221 HC OXYGEN, UP TO 24 HOURS

## 2017-05-01 PROCEDURE — 83735 ASSAY OF MAGNESIUM: CPT

## 2017-05-01 PROCEDURE — 85027 COMPLETE CBC AUTOMATED: CPT

## 2017-05-01 PROCEDURE — 85045 AUTOMATED RETICULOCYTE COUNT: CPT

## 2017-05-01 PROCEDURE — 85007 BL SMEAR W/DIFF WBC COUNT: CPT

## 2017-05-01 PROCEDURE — 25000242 PHARM REV CODE 250 ALT 637 W/ HCPCS: Performed by: NURSE PRACTITIONER

## 2017-05-01 PROCEDURE — 36569 INSJ PICC 5 YR+ W/O IMAGING: CPT

## 2017-05-01 PROCEDURE — 99233 SBSQ HOSP IP/OBS HIGH 50: CPT | Mod: ,,, | Performed by: HOSPITALIST

## 2017-05-01 PROCEDURE — 80053 COMPREHEN METABOLIC PANEL: CPT

## 2017-05-01 PROCEDURE — 76937 US GUIDE VASCULAR ACCESS: CPT

## 2017-05-01 RX ORDER — DIPHENHYDRAMINE HYDROCHLORIDE 50 MG/ML
50 INJECTION INTRAMUSCULAR; INTRAVENOUS ONCE
Status: COMPLETED | OUTPATIENT
Start: 2017-05-01 | End: 2017-05-01

## 2017-05-01 RX ORDER — GADOBUTROL 604.72 MG/ML
10 INJECTION INTRAVENOUS
Status: COMPLETED | OUTPATIENT
Start: 2017-05-01 | End: 2017-05-01

## 2017-05-01 RX ADMIN — LACTULOSE 20 G: 20 SOLUTION ORAL at 05:05

## 2017-05-01 RX ADMIN — CARVEDILOL 25 MG: 25 TABLET, FILM COATED ORAL at 09:05

## 2017-05-01 RX ADMIN — GABAPENTIN 600 MG: 300 CAPSULE ORAL at 09:05

## 2017-05-01 RX ADMIN — CETIRIZINE HYDROCHLORIDE 5 MG: 5 TABLET, FILM COATED ORAL at 09:05

## 2017-05-01 RX ADMIN — DIPHENHYDRAMINE HYDROCHLORIDE 25 MG: 25 CAPSULE ORAL at 05:05

## 2017-05-01 RX ADMIN — HYDROMORPHONE HYDROCHLORIDE 3 MG: 1 INJECTION, SOLUTION INTRAMUSCULAR; INTRAVENOUS; SUBCUTANEOUS at 06:05

## 2017-05-01 RX ADMIN — HYDROMORPHONE HYDROCHLORIDE 3 MG: 1 INJECTION, SOLUTION INTRAMUSCULAR; INTRAVENOUS; SUBCUTANEOUS at 01:05

## 2017-05-01 RX ADMIN — CARBAMAZEPINE 400 MG: 200 TABLET ORAL at 09:05

## 2017-05-01 RX ADMIN — AMPICILLIN SODIUM AND SULBACTAM SODIUM 3 G: 2; 1 INJECTION, POWDER, FOR SOLUTION INTRAMUSCULAR; INTRAVENOUS at 09:05

## 2017-05-01 RX ADMIN — HYDRALAZINE HYDROCHLORIDE 100 MG: 50 TABLET ORAL at 05:05

## 2017-05-01 RX ADMIN — Medication 3 ML: at 09:05

## 2017-05-01 RX ADMIN — IPRATROPIUM BROMIDE AND ALBUTEROL SULFATE 3 ML: .5; 3 SOLUTION RESPIRATORY (INHALATION) at 02:05

## 2017-05-01 RX ADMIN — AMLODIPINE BESYLATE 10 MG: 10 TABLET ORAL at 09:05

## 2017-05-01 RX ADMIN — HYDRALAZINE HYDROCHLORIDE 100 MG: 50 TABLET ORAL at 09:05

## 2017-05-01 RX ADMIN — IBUPROFEN 400 MG: 400 TABLET, FILM COATED ORAL at 05:05

## 2017-05-01 RX ADMIN — Medication 3 ML: at 01:05

## 2017-05-01 RX ADMIN — VANCOMYCIN HYDROCHLORIDE 2000 MG: 1 INJECTION, POWDER, LYOPHILIZED, FOR SOLUTION INTRAVENOUS at 09:05

## 2017-05-01 RX ADMIN — HYDROMORPHONE HYDROCHLORIDE 3 MG: 1 INJECTION, SOLUTION INTRAMUSCULAR; INTRAVENOUS; SUBCUTANEOUS at 12:05

## 2017-05-01 RX ADMIN — IBUPROFEN 400 MG: 400 TABLET, FILM COATED ORAL at 01:05

## 2017-05-01 RX ADMIN — OXYCODONE HYDROCHLORIDE 30 MG: 5 TABLET ORAL at 09:05

## 2017-05-01 RX ADMIN — OXYCODONE HYDROCHLORIDE 30 MG: 5 TABLET ORAL at 05:05

## 2017-05-01 RX ADMIN — FLUTICASONE FUROATE AND VILANTEROL TRIFENATATE 1 PUFF: 100; 25 POWDER RESPIRATORY (INHALATION) at 09:05

## 2017-05-01 RX ADMIN — BENZONATATE 100 MG: 100 CAPSULE ORAL at 05:05

## 2017-05-01 RX ADMIN — DIPHENHYDRAMINE HYDROCHLORIDE 50 MG: 50 INJECTION, SOLUTION INTRAMUSCULAR; INTRAVENOUS at 09:05

## 2017-05-01 RX ADMIN — BACLOFEN 10 MG: 10 TABLET ORAL at 09:05

## 2017-05-01 RX ADMIN — AMPICILLIN SODIUM AND SULBACTAM SODIUM 3 G: 2; 1 INJECTION, POWDER, FOR SOLUTION INTRAMUSCULAR; INTRAVENOUS at 03:05

## 2017-05-01 RX ADMIN — HYDROMORPHONE HYDROCHLORIDE 3 MG: 1 INJECTION, SOLUTION INTRAMUSCULAR; INTRAVENOUS; SUBCUTANEOUS at 04:05

## 2017-05-01 RX ADMIN — HYDROMORPHONE HYDROCHLORIDE 3 MG: 1 INJECTION, SOLUTION INTRAMUSCULAR; INTRAVENOUS; SUBCUTANEOUS at 09:05

## 2017-05-01 RX ADMIN — SODIUM CHLORIDE: 0.9 INJECTION, SOLUTION INTRAVENOUS at 02:05

## 2017-05-01 RX ADMIN — FLUOXETINE 40 MG: 20 CAPSULE ORAL at 09:05

## 2017-05-01 RX ADMIN — STANDARDIZED SENNA CONCENTRATE AND DOCUSATE SODIUM 1 TABLET: 8.6; 5 TABLET, FILM COATED ORAL at 09:05

## 2017-05-01 RX ADMIN — LISINOPRIL 40 MG: 20 TABLET ORAL at 09:05

## 2017-05-01 RX ADMIN — HYDROMORPHONE HYDROCHLORIDE 3 MG: 1 INJECTION, SOLUTION INTRAMUSCULAR; INTRAVENOUS; SUBCUTANEOUS at 03:05

## 2017-05-01 RX ADMIN — FLUTICASONE PROPIONATE 2 SPRAY: 50 SPRAY, METERED NASAL at 09:05

## 2017-05-01 RX ADMIN — HEPARIN SODIUM 5000 UNITS: 5000 INJECTION, SOLUTION INTRAVENOUS; SUBCUTANEOUS at 09:05

## 2017-05-01 RX ADMIN — PROMETHAZINE HYDROCHLORIDE AND CODEINE PHOSPHATE 5 ML: 6.25; 1 SYRUP ORAL at 05:05

## 2017-05-01 RX ADMIN — PANTOPRAZOLE SODIUM 600 MG: 40 TABLET, DELAYED RELEASE ORAL at 09:05

## 2017-05-01 RX ADMIN — HYDRALAZINE HYDROCHLORIDE 100 MG: 50 TABLET ORAL at 01:05

## 2017-05-01 RX ADMIN — IPRATROPIUM BROMIDE AND ALBUTEROL SULFATE 3 ML: .5; 3 SOLUTION RESPIRATORY (INHALATION) at 12:05

## 2017-05-01 RX ADMIN — IPRATROPIUM BROMIDE AND ALBUTEROL SULFATE 3 ML: .5; 3 SOLUTION RESPIRATORY (INHALATION) at 07:05

## 2017-05-01 RX ADMIN — HEPARIN SODIUM 5000 UNITS: 5000 INJECTION, SOLUTION INTRAVENOUS; SUBCUTANEOUS at 01:05

## 2017-05-01 RX ADMIN — IPRATROPIUM BROMIDE AND ALBUTEROL SULFATE 3 ML: .5; 3 SOLUTION RESPIRATORY (INHALATION) at 04:05

## 2017-05-01 RX ADMIN — PREDNISONE 40 MG: 20 TABLET ORAL at 09:05

## 2017-05-01 RX ADMIN — HEPARIN SODIUM 5000 UNITS: 5000 INJECTION, SOLUTION INTRAVENOUS; SUBCUTANEOUS at 05:05

## 2017-05-01 RX ADMIN — IPRATROPIUM BROMIDE AND ALBUTEROL SULFATE 3 ML: .5; 3 SOLUTION RESPIRATORY (INHALATION) at 11:05

## 2017-05-01 RX ADMIN — GADOBUTROL 10 ML: 604.72 INJECTION INTRAVENOUS at 07:05

## 2017-05-01 RX ADMIN — IBUPROFEN 400 MG: 400 TABLET, FILM COATED ORAL at 09:05

## 2017-05-01 NOTE — PLAN OF CARE
05/01/17 1240   Discharge Reassessment   Assessment Type Discharge Planning Reassessment   Can the patient answer the patient profile reliably? Yes, cognitively intact   How does the patient rate their overall health at the present time? Fair   Describe the patient's ability to walk at the present time. Minor restrictions or changes   How often would a person be available to care for the patient? Occasionally   Number of comorbid conditions (as recorded on the chart) Three   During the past month, has the patient often been bothered by feeling down, depressed or hopeless? No   During the past month, has the patient often been bothered by little interest or pleasure in doing things? No   Discharge plan remains the same: Yes   Provided patient/caregiver education on the expected discharge date and the discharge plan Yes   Discharge Plan A Home with family   Change in patient condition or support system No

## 2017-05-01 NOTE — PROGRESS NOTES
Home Oxygen Evaluation    Date Performed: 2017    1) Patient's Home O2 Sat on room air, while at rest: 95%        If O2 sats on room air at rest are 88% or below, patient qualifies. No additional testing needed. Document N/A in steps 2 and 3. If 89% or above, complete steps 2.      2) Patient's O2 Sat on room air while exercisin%        If O2 sats on room air while exercising remain 89% or above patient does not qualify, no further testing needed Document N/A in step 3. If O2 sats on room air while exercising are 88% or below, continue to step 3.      3) Patient's O2 Sat while exercising on O2: 2-3L at 93% LPM         (Must show improvement from #2 for patients to qualify)    If O2 sats improve on oxygen, patient qualifies for portable oxygen. If not, the patient does not qualify.

## 2017-05-01 NOTE — CONSULTS
Single lumen 18g x 8cm midline placed to right cephalic vein. Max dwell date 5/30/17, Lot# [id number].  Needle advanced into the vessel under real time ultrasound guidance.  Image recorded and saved.

## 2017-05-01 NOTE — PLAN OF CARE
Problem: Patient Care Overview  Goal: Plan of Care Review  Outcome: Ongoing (interventions implemented as appropriate)  Pt remained stable this shift- no falls/injuries or any new skin breakdown. VSS.  Pt c/o pain throughout shift. PRN pain meds administered throughout shift that provided some relief. Bed is in low position with breaks locked. Side rails are up x 2. Environment is clutter free. Call light is within reach of the patient. Will continue to monitor.

## 2017-05-02 LAB
ALBUMIN SERPL BCP-MCNC: 2.6 G/DL
ALP SERPL-CCNC: 141 U/L
ALT SERPL W/O P-5'-P-CCNC: 19 U/L
ANION GAP SERPL CALC-SCNC: 8 MMOL/L
ANISOCYTOSIS BLD QL SMEAR: ABNORMAL
AST SERPL-CCNC: 15 U/L
BASOPHILS # BLD AUTO: 0.01 K/UL
BASOPHILS NFR BLD: 0.1 %
BILIRUB SERPL-MCNC: 0.3 MG/DL
BUN SERPL-MCNC: 10 MG/DL
CALCIUM SERPL-MCNC: 8.1 MG/DL
CHLORIDE SERPL-SCNC: 102 MMOL/L
CO2 SERPL-SCNC: 29 MMOL/L
CREAT SERPL-MCNC: 0.7 MG/DL
DIFFERENTIAL METHOD: ABNORMAL
EOSINOPHIL # BLD AUTO: 0.6 K/UL
EOSINOPHIL NFR BLD: 5.4 %
ERYTHROCYTE [DISTWIDTH] IN BLOOD BY AUTOMATED COUNT: 21.5 %
EST. GFR  (AFRICAN AMERICAN): >60 ML/MIN/1.73 M^2
EST. GFR  (NON AFRICAN AMERICAN): >60 ML/MIN/1.73 M^2
GLUCOSE SERPL-MCNC: 148 MG/DL
HCT VFR BLD AUTO: 29.1 %
HGB BLD-MCNC: 9.6 G/DL
HYPOCHROMIA BLD QL SMEAR: ABNORMAL
LYMPHOCYTES # BLD AUTO: 3.3 K/UL
LYMPHOCYTES NFR BLD: 31 %
MAGNESIUM SERPL-MCNC: 2.3 MG/DL
MCH RBC QN AUTO: 23.8 PG
MCHC RBC AUTO-ENTMCNC: 33 %
MCV RBC AUTO: 72 FL
MONOCYTES # BLD AUTO: 0.9 K/UL
MONOCYTES NFR BLD: 8.7 %
NEUTROPHILS # BLD AUTO: 5.7 K/UL
NEUTROPHILS NFR BLD: 54.8 %
PHOSPHATE SERPL-MCNC: 3.8 MG/DL
PLATELET # BLD AUTO: 168 K/UL
PLATELET BLD QL SMEAR: ABNORMAL
PMV BLD AUTO: 8.6 FL
POIKILOCYTOSIS BLD QL SMEAR: SLIGHT
POLYCHROMASIA BLD QL SMEAR: ABNORMAL
POTASSIUM SERPL-SCNC: 3.6 MMOL/L
PROT SERPL-MCNC: 6.3 G/DL
RBC # BLD AUTO: 4.03 M/UL
RETICS/RBC NFR AUTO: 5.8 %
SODIUM SERPL-SCNC: 139 MMOL/L
TARGETS BLD QL SMEAR: ABNORMAL
WBC # BLD AUTO: 10.57 K/UL

## 2017-05-02 PROCEDURE — 25000003 PHARM REV CODE 250: Performed by: HOSPITALIST

## 2017-05-02 PROCEDURE — 11000001 HC ACUTE MED/SURG PRIVATE ROOM

## 2017-05-02 PROCEDURE — 80053 COMPREHEN METABOLIC PANEL: CPT

## 2017-05-02 PROCEDURE — 83735 ASSAY OF MAGNESIUM: CPT

## 2017-05-02 PROCEDURE — 27000221 HC OXYGEN, UP TO 24 HOURS

## 2017-05-02 PROCEDURE — 25000003 PHARM REV CODE 250: Performed by: PHYSICIAN ASSISTANT

## 2017-05-02 PROCEDURE — 85025 COMPLETE CBC W/AUTO DIFF WBC: CPT

## 2017-05-02 PROCEDURE — 63600175 PHARM REV CODE 636 W HCPCS: Performed by: HOSPITALIST

## 2017-05-02 PROCEDURE — 25000242 PHARM REV CODE 250 ALT 637 W/ HCPCS: Performed by: NURSE PRACTITIONER

## 2017-05-02 PROCEDURE — 36415 COLL VENOUS BLD VENIPUNCTURE: CPT

## 2017-05-02 PROCEDURE — 84100 ASSAY OF PHOSPHORUS: CPT

## 2017-05-02 PROCEDURE — 85045 AUTOMATED RETICULOCYTE COUNT: CPT

## 2017-05-02 PROCEDURE — 94760 N-INVAS EAR/PLS OXIMETRY 1: CPT

## 2017-05-02 PROCEDURE — 99233 SBSQ HOSP IP/OBS HIGH 50: CPT | Mod: ,,, | Performed by: HOSPITALIST

## 2017-05-02 PROCEDURE — 94640 AIRWAY INHALATION TREATMENT: CPT

## 2017-05-02 RX ADMIN — HYDRALAZINE HYDROCHLORIDE 100 MG: 50 TABLET ORAL at 04:05

## 2017-05-02 RX ADMIN — FLUTICASONE FUROATE AND VILANTEROL TRIFENATATE 1 PUFF: 100; 25 POWDER RESPIRATORY (INHALATION) at 09:05

## 2017-05-02 RX ADMIN — HYDROMORPHONE HYDROCHLORIDE 3 MG: 1 INJECTION, SOLUTION INTRAMUSCULAR; INTRAVENOUS; SUBCUTANEOUS at 10:05

## 2017-05-02 RX ADMIN — AMPICILLIN SODIUM AND SULBACTAM SODIUM 3 G: 2; 1 INJECTION, POWDER, FOR SOLUTION INTRAMUSCULAR; INTRAVENOUS at 04:05

## 2017-05-02 RX ADMIN — AMPICILLIN SODIUM AND SULBACTAM SODIUM 3 G: 2; 1 INJECTION, POWDER, FOR SOLUTION INTRAMUSCULAR; INTRAVENOUS at 10:05

## 2017-05-02 RX ADMIN — IPRATROPIUM BROMIDE AND ALBUTEROL SULFATE 3 ML: .5; 3 SOLUTION RESPIRATORY (INHALATION) at 11:05

## 2017-05-02 RX ADMIN — FLUOXETINE 40 MG: 20 CAPSULE ORAL at 09:05

## 2017-05-02 RX ADMIN — CARBAMAZEPINE 400 MG: 200 TABLET ORAL at 09:05

## 2017-05-02 RX ADMIN — BACLOFEN 10 MG: 10 TABLET ORAL at 09:05

## 2017-05-02 RX ADMIN — HEPARIN SODIUM 5000 UNITS: 5000 INJECTION, SOLUTION INTRAVENOUS; SUBCUTANEOUS at 09:05

## 2017-05-02 RX ADMIN — GABAPENTIN 600 MG: 300 CAPSULE ORAL at 09:05

## 2017-05-02 RX ADMIN — AMPICILLIN SODIUM AND SULBACTAM SODIUM 3 G: 2; 1 INJECTION, POWDER, FOR SOLUTION INTRAMUSCULAR; INTRAVENOUS at 09:05

## 2017-05-02 RX ADMIN — IPRATROPIUM BROMIDE AND ALBUTEROL SULFATE 3 ML: .5; 3 SOLUTION RESPIRATORY (INHALATION) at 07:05

## 2017-05-02 RX ADMIN — CARVEDILOL 25 MG: 25 TABLET, FILM COATED ORAL at 09:05

## 2017-05-02 RX ADMIN — LACTULOSE 20 G: 20 SOLUTION ORAL at 05:05

## 2017-05-02 RX ADMIN — STANDARDIZED SENNA CONCENTRATE AND DOCUSATE SODIUM 1 TABLET: 8.6; 5 TABLET, FILM COATED ORAL at 09:05

## 2017-05-02 RX ADMIN — HEPARIN SODIUM 5000 UNITS: 5000 INJECTION, SOLUTION INTRAVENOUS; SUBCUTANEOUS at 04:05

## 2017-05-02 RX ADMIN — Medication 3 ML: at 02:05

## 2017-05-02 RX ADMIN — PREDNISONE 40 MG: 20 TABLET ORAL at 09:05

## 2017-05-02 RX ADMIN — HYDROMORPHONE HYDROCHLORIDE 3 MG: 1 INJECTION, SOLUTION INTRAMUSCULAR; INTRAVENOUS; SUBCUTANEOUS at 09:05

## 2017-05-02 RX ADMIN — PANTOPRAZOLE SODIUM 600 MG: 40 TABLET, DELAYED RELEASE ORAL at 09:05

## 2017-05-02 RX ADMIN — HYDROMORPHONE HYDROCHLORIDE 3 MG: 1 INJECTION, SOLUTION INTRAMUSCULAR; INTRAVENOUS; SUBCUTANEOUS at 02:05

## 2017-05-02 RX ADMIN — Medication 3 ML: at 05:05

## 2017-05-02 RX ADMIN — SODIUM CHLORIDE: 0.9 INJECTION, SOLUTION INTRAVENOUS at 06:05

## 2017-05-02 RX ADMIN — IBUPROFEN 400 MG: 400 TABLET, FILM COATED ORAL at 04:05

## 2017-05-02 RX ADMIN — Medication 3 ML: at 10:05

## 2017-05-02 RX ADMIN — IBUPROFEN 400 MG: 400 TABLET, FILM COATED ORAL at 09:05

## 2017-05-02 RX ADMIN — IBUPROFEN 400 MG: 400 TABLET, FILM COATED ORAL at 05:05

## 2017-05-02 RX ADMIN — AMPICILLIN SODIUM AND SULBACTAM SODIUM 3 G: 2; 1 INJECTION, POWDER, FOR SOLUTION INTRAMUSCULAR; INTRAVENOUS at 03:05

## 2017-05-02 RX ADMIN — OXYCODONE HYDROCHLORIDE 30 MG: 5 TABLET ORAL at 05:05

## 2017-05-02 RX ADMIN — CETIRIZINE HYDROCHLORIDE 5 MG: 5 TABLET, FILM COATED ORAL at 09:05

## 2017-05-02 RX ADMIN — IPRATROPIUM BROMIDE AND ALBUTEROL SULFATE 3 ML: .5; 3 SOLUTION RESPIRATORY (INHALATION) at 02:05

## 2017-05-02 RX ADMIN — LISINOPRIL 40 MG: 20 TABLET ORAL at 09:05

## 2017-05-02 RX ADMIN — HYDROMORPHONE HYDROCHLORIDE 3 MG: 1 INJECTION, SOLUTION INTRAMUSCULAR; INTRAVENOUS; SUBCUTANEOUS at 12:05

## 2017-05-02 RX ADMIN — FLUTICASONE PROPIONATE 2 SPRAY: 50 SPRAY, METERED NASAL at 09:05

## 2017-05-02 RX ADMIN — DIPHENHYDRAMINE HYDROCHLORIDE 25 MG: 25 CAPSULE ORAL at 09:05

## 2017-05-02 RX ADMIN — HEPARIN SODIUM 5000 UNITS: 5000 INJECTION, SOLUTION INTRAVENOUS; SUBCUTANEOUS at 05:05

## 2017-05-02 RX ADMIN — HYDRALAZINE HYDROCHLORIDE 100 MG: 50 TABLET ORAL at 05:05

## 2017-05-02 RX ADMIN — HYDROMORPHONE HYDROCHLORIDE 3 MG: 1 INJECTION, SOLUTION INTRAMUSCULAR; INTRAVENOUS; SUBCUTANEOUS at 04:05

## 2017-05-02 RX ADMIN — HYDROMORPHONE HYDROCHLORIDE 3 MG: 1 INJECTION, SOLUTION INTRAMUSCULAR; INTRAVENOUS; SUBCUTANEOUS at 06:05

## 2017-05-02 RX ADMIN — HYDRALAZINE HYDROCHLORIDE 100 MG: 50 TABLET ORAL at 09:05

## 2017-05-02 RX ADMIN — OXYCODONE HYDROCHLORIDE 30 MG: 5 TABLET ORAL at 08:05

## 2017-05-02 RX ADMIN — AMLODIPINE BESYLATE 10 MG: 10 TABLET ORAL at 09:05

## 2017-05-02 RX ADMIN — IPRATROPIUM BROMIDE AND ALBUTEROL SULFATE 3 ML: .5; 3 SOLUTION RESPIRATORY (INHALATION) at 03:05

## 2017-05-02 NOTE — PLAN OF CARE
Problem: Patient Care Overview  Goal: Plan of Care Review  Outcome: Ongoing (interventions implemented as appropriate)  Pt in room in no distress at this time. Pt given pain medication PRN as ordered. Pt Antibiotics given as ordered and pt on NS gtt at 100 ml/hr throughout shift. Pt expressed readiness as well as apprehension about going home. Monitoring.

## 2017-05-02 NOTE — PROGRESS NOTES
"[facility] Hospital Medicine  Progress Note    Patient Name: Nazanin Malone  MRN: [id number]  Patient Class: IP- Inpatient   Admission Date: 4/16/2017  Length of Stay: 15 days  Attending Physician: Raymond Palacio MD  Primary Care Provider: [facility] Medicine Team: [facility] A Raymond Palacio MD    Subjective:     Principal Problem:Sickle-cell disease with pain    HPI:  Nazanin Malone is a 37 y.o. female who  has a PMH of Hypertension; Sickle cell-beta thalassemia disease with pain; and Trigeminal neuralgia. The patient presented to [facility] on 4/16/2017 due to worsening right leg pain over the last week as well as a cough productive of green mucous. She states she came to ED 2 days ago for her cough and was prescribed Augmentin. She says the mucous is no longer green, but she still feels all "stuffed up and congested." She has had to be admitted a few other times due to sickle pain crisis, but rarely needs transfusions. Persistently asking for more pain medications,     Hospital Course:  4/25- audible wheezing; afebrile; switched to solumedrol    4/26/17- patient is no longer wheezing and feels much better today    4/27/17- states cough is still bothering here and leads to pain; still requiring oxygen and will likely need to go home on that;     4/28- CT chest shows multifocal PNA; concerning for acute chest syndrome, abg, re-consult heme/onc    4/29- patient feeling better today; currently off oxygen      Interval History: patient is still complaining up diffuse pain, 8/10; also of a cough;     Review of Systems   Constitutional: Negative for chills and fever.   HENT: Negative for rhinorrhea and sore throat.    Eyes: Negative for pain and discharge.   Respiratory: Positive for cough and shortness of breath. Negative for wheezing.    Cardiovascular: Negative for chest pain and leg swelling.   Gastrointestinal: Negative for abdominal distention, abdominal " pain, blood in stool, nausea and vomiting.   Endocrine: Negative for cold intolerance and heat intolerance.   Genitourinary: Negative for dysuria and flank pain.   Neurological: Negative for dizziness and numbness.   Psychiatric/Behavioral: Negative for behavioral problems and confusion.     Objective:     Vital Signs (Most Recent):  Temp: 98.9 °F (37.2 °C) (05/01/17 1600)  Pulse: 94 (05/01/17 1600)  Resp: 18 (05/01/17 1600)  BP: 139/82 (05/01/17 1600)  SpO2: 96 % (05/01/17 1600) Vital Signs (24h Range):  Temp:  [98.7 °F (37.1 °C)-99.6 °F (37.6 °C)] 98.9 °F (37.2 °C)  Pulse:  [] 94  Resp:  [12-18] 18  SpO2:  [95 %-99 %] 96 %  BP: (126-147)/(70-82) 139/82     Weight: 136.1 kg (300 lb)  Body mass index is 54.87 kg/(m^2).    Intake/Output Summary (Last 24 hours) at 05/01/17 1932  Last data filed at 05/01/17 0430   Gross per 24 hour   Intake             1320 ml   Output                0 ml   Net             1320 ml      Physical Exam   Constitutional: She is oriented to person, place, and time. She appears well-developed and well-nourished.   HENT:   Head: Normocephalic and atraumatic.   Eyes: Conjunctivae are normal. Pupils are equal, round, and reactive to light.   Neck: Normal range of motion. No thyromegaly present.   Cardiovascular: Normal rate, regular rhythm and normal heart sounds.    Pulmonary/Chest: No respiratory distress. She has no wheezes.   Abdominal: Soft. She exhibits no distension. There is no tenderness.   Musculoskeletal: Normal range of motion. She exhibits no edema.   Neurological: She is alert and oriented to person, place, and time.   Skin: No erythema. No pallor.       Significant Labs:   CBC:     Recent Labs  Lab 04/30/17  0404 05/01/17  0414   WBC 17.27* 15.12*   HGB 10.0* 10.0*   HCT 30.0* 30.6*    175     CMP:     Recent Labs  Lab 04/30/17  0404 05/01/17  0414    138   K 3.8 4.0    100   CO2 31* 29    97   BUN 11 10   CREATININE 0.7 0.7   CALCIUM 8.4* 8.6*    PROT 6.8 6.4   ALBUMIN 3.1* 2.8*   BILITOT 0.5 0.7   ALKPHOS 150* 147*   AST 18 18   ALT 25 18   ANIONGAP 7* 9   EGFRNONAA >60.0 >60.0       Significant Imaging:     CT Chest wo-        Multifocal subsegmental areas of patchy consolidation and groundglass attenuation within both lower lobes and the left upper lobe. Findings are most concerning for multifocal pneumonia with differential considerations including pulmonary hemorrhage and noninfectious inflammatory processes.  Recommend followup study in 4-6 weeks to ensure resolution      Assessment/Plan:      # Sickle Cell pain crisis  # opioid dependence  - pt having complaints typical of her pain crisis.  - MRI of the right HIP did not reveal any evidence of avascular necrosis  - Dilaudid 3mg IV q3 prn; cont fentanyl patch  - Seen by Hematology appreciate recs; pain currently well controlled      # Asthma exacerbation  # Acute respiratory failure with hypoxia  - improving   - PO prednisone 40 mg   - will start advair; needs pulmonology follow up  - will also add mucinex      # Sepsis due to suspected Strep PNA  # Multi-focal PNA  Vanc and Unasyn  - acapella every 4 hrs PRN           #Bronchitis  Duonebs PRN  Start Prednisone 50mg daily  Advair      # Benign Essential HTN  - continue home Norvasc  - coreg 25 mg PO BID   - started on lisinopril 40 mg daily ; prn hydralazine       # RLE swelling and pain  - BLE ultrasound negative for DVT  MRI of the Hip revealed periostitis, negative for Avascular necrosis      # Morbid Obesity  Body mass index is 54.87 kg/(m^2).       PT/OT          DVT/GI ppx  - Hep SQ  - regular diet       VTE Risk Mitigation         Ordered     heparin (porcine) injection 5,000 Units  Every 8 hours     Route:  Subcutaneous        04/16/17 0813     Medium Risk of VTE  Once      04/16/17 0813     Place sequential compression device  Until discontinued      04/16/17 0813     Place JOSE LUIS hose  Until discontinued      04/16/17 0813          Raymond  MD Hakeem  Department of Hospital Medicine   [facility]

## 2017-05-02 NOTE — SUBJECTIVE & OBJECTIVE
Interval History: patient is still complaining up diffuse pain, 8/10; also of a cough;     Review of Systems   Constitutional: Negative for chills and fever.   HENT: Negative for rhinorrhea and sore throat.    Eyes: Negative for pain and discharge.   Respiratory: Positive for cough and shortness of breath. Negative for wheezing.    Cardiovascular: Negative for chest pain and leg swelling.   Gastrointestinal: Negative for abdominal distention, abdominal pain, blood in stool, nausea and vomiting.   Endocrine: Negative for cold intolerance and heat intolerance.   Genitourinary: Negative for dysuria and flank pain.   Neurological: Negative for dizziness and numbness.   Psychiatric/Behavioral: Negative for behavioral problems and confusion.     Objective:     Vital Signs (Most Recent):  Temp: 98.9 °F (37.2 °C) (05/01/17 1600)  Pulse: 94 (05/01/17 1600)  Resp: 18 (05/01/17 1600)  BP: 139/82 (05/01/17 1600)  SpO2: 96 % (05/01/17 1600) Vital Signs (24h Range):  Temp:  [98.7 °F (37.1 °C)-99.6 °F (37.6 °C)] 98.9 °F (37.2 °C)  Pulse:  [] 94  Resp:  [12-18] 18  SpO2:  [95 %-99 %] 96 %  BP: (126-147)/(70-82) 139/82     Weight: 136.1 kg (300 lb)  Body mass index is 54.87 kg/(m^2).    Intake/Output Summary (Last 24 hours) at 05/01/17 1932  Last data filed at 05/01/17 0430   Gross per 24 hour   Intake             1320 ml   Output                0 ml   Net             1320 ml      Physical Exam   Constitutional: She is oriented to person, place, and time. She appears well-developed and well-nourished.   HENT:   Head: Normocephalic and atraumatic.   Eyes: Conjunctivae are normal. Pupils are equal, round, and reactive to light.   Neck: Normal range of motion. No thyromegaly present.   Cardiovascular: Normal rate, regular rhythm and normal heart sounds.    Pulmonary/Chest: No respiratory distress. She has no wheezes.   Abdominal: Soft. She exhibits no distension. There is no tenderness.   Musculoskeletal: Normal range of motion.  She exhibits no edema.   Neurological: She is alert and oriented to person, place, and time.   Skin: No erythema. No pallor.       Significant Labs:   CBC:     Recent Labs  Lab 04/30/17 0404 05/01/17  0414   WBC 17.27* 15.12*   HGB 10.0* 10.0*   HCT 30.0* 30.6*    175     CMP:     Recent Labs  Lab 04/30/17 0404 05/01/17  0414    138   K 3.8 4.0    100   CO2 31* 29    97   BUN 11 10   CREATININE 0.7 0.7   CALCIUM 8.4* 8.6*   PROT 6.8 6.4   ALBUMIN 3.1* 2.8*   BILITOT 0.5 0.7   ALKPHOS 150* 147*   AST 18 18   ALT 25 18   ANIONGAP 7* 9   EGFRNONAA >60.0 >60.0       Significant Imaging:     CT Chest wo-        Multifocal subsegmental areas of patchy consolidation and groundglass attenuation within both lower lobes and the left upper lobe. Findings are most concerning for multifocal pneumonia with differential considerations including pulmonary hemorrhage and noninfectious inflammatory processes.  Recommend followup study in 4-6 weeks to ensure resolution

## 2017-05-02 NOTE — PROGRESS NOTES
"[facility] Hospital Medicine  Progress Note    Patient Name: Nazanin Malone  MRN: [id number]  Patient Class: IP- Inpatient   Admission Date: 4/16/2017  Length of Stay: 15 days  Attending Physician: Raymond Palacio MD  Primary Care Provider: [facility] Doctor [facility] Medicine Team: [facility] A Raymond Palacio MD    Subjective:     Principal Problem:Sickle-cell disease with pain    HPI:  Nazanin Malone is a 37 y.o. female who  has a PMH of Hypertension; Sickle cell-beta thalassemia disease with pain; and Trigeminal neuralgia. The patient presented to [facility] on 4/16/2017 due to worsening right leg pain over the last week as well as a cough productive of green mucous. She states she came to ED 2 days ago for her cough and was prescribed Augmentin. She says the mucous is no longer green, but she still feels all "stuffed up and congested." She has had to be admitted a few other times due to sickle pain crisis, but rarely needs transfusions. Persistently asking for more pain medications,     Hospital Course:  4/25- audible wheezing; afebrile; switched to solumedrol    4/26/17- patient is no longer wheezing and feels much better today    4/27/17- states cough is still bothering here and leads to pain; still requiring oxygen and will likely need to go home on that;     4/28- CT chest shows multifocal PNA; concerning for acute chest syndrome, abg, re-consult heme/onc    4/29- patient feeling better today; currently off oxygen    5/1- patient complaining of left sided weakness and numbness; upper extremity U/S negative for DVT; MRI pending       No new subjective & objective note has been filed under this hospital service since the last note was generated.    Assessment/Plan:      # Sickle Cell pain crisis  # opioid dependence  - pt having complaints typical of her pain crisis.  - MRI of the right HIP did not reveal any evidence of avascular necrosis  - Dilaudid 3mg IV q3 prn; " stopping fentanyl patch  - Seen by Hematology appreciate recs; pain currently well controlled      # Asthma exacerbation  # Acute respiratory failure with hypoxia  - improving   - PO prednisone 40 mg   - will start advair; needs pulmonology follow up  - will also add mucinex      # Sepsis due to suspected Strep PNA  # Multi-focal PNA   Unasyn  - acapella every 4 hrs PRN       # Left Sided weakness and numbness and swelling  - UE U/S negative for DVT  - MRI brain and C-spine pending      #Bronchitis  Duonebs PRN  Start Prednisone 50mg daily  Advair      # Benign Essential HTN  - continue home Norvasc  - coreg 25 mg PO BID   -  on lisinopril 40 mg daily ; scheduled hydralazine 100 mg PO TID      # RLE swelling and pain  - BLE ultrasound negative for DVT  MRI of the Hip revealed periostitis, negative for Avascular necrosis      # Morbid Obesity  Body mass index is 54.87 kg/(m^2).       PT/OT          DVT/GI ppx  - Hep SQ  - regular diet     Discharge Disposition- if patient's MRI is negative should discharge tomorrow      VTE Risk Mitigation         Ordered     heparin (porcine) injection 5,000 Units  Every 8 hours     Route:  Subcutaneous        04/16/17 0813     Medium Risk of VTE  Once      04/16/17 0813     Place sequential compression device  Until discontinued      04/16/17 0813     Place JOSE LUIS hose  Until discontinued      04/16/17 0813          Raymond Palacio MD  Department of Hospital Medicine   [facility]

## 2017-05-03 LAB
ALBUMIN SERPL BCP-MCNC: 2.9 G/DL
ALP SERPL-CCNC: 137 U/L
ALT SERPL W/O P-5'-P-CCNC: 20 U/L
ANION GAP SERPL CALC-SCNC: 5 MMOL/L
ANISOCYTOSIS BLD QL SMEAR: SLIGHT
AST SERPL-CCNC: 16 U/L
BACTERIA BLD CULT: NORMAL
BASOPHILS # BLD AUTO: 0.01 K/UL
BASOPHILS NFR BLD: 0.1 %
BILIRUB SERPL-MCNC: 0.4 MG/DL
BUN SERPL-MCNC: 10 MG/DL
CALCIUM SERPL-MCNC: 9 MG/DL
CHLORIDE SERPL-SCNC: 105 MMOL/L
CO2 SERPL-SCNC: 31 MMOL/L
CREAT SERPL-MCNC: 0.7 MG/DL
DACRYOCYTES BLD QL SMEAR: ABNORMAL
DIFFERENTIAL METHOD: ABNORMAL
EOSINOPHIL # BLD AUTO: 0.4 K/UL
EOSINOPHIL NFR BLD: 3.9 %
ERYTHROCYTE [DISTWIDTH] IN BLOOD BY AUTOMATED COUNT: 21 %
EST. GFR  (AFRICAN AMERICAN): >60 ML/MIN/1.73 M^2
EST. GFR  (NON AFRICAN AMERICAN): >60 ML/MIN/1.73 M^2
GLUCOSE SERPL-MCNC: 91 MG/DL
HCT VFR BLD AUTO: 29.1 %
HGB BLD-MCNC: 9.2 G/DL
HYPOCHROMIA BLD QL SMEAR: ABNORMAL
LYMPHOCYTES # BLD AUTO: 4.2 K/UL
LYMPHOCYTES NFR BLD: 41.3 %
MAGNESIUM SERPL-MCNC: 2.2 MG/DL
MCH RBC QN AUTO: 22.8 PG
MCHC RBC AUTO-ENTMCNC: 31.6 %
MCV RBC AUTO: 72 FL
MONOCYTES # BLD AUTO: 1.1 K/UL
MONOCYTES NFR BLD: 10.4 %
NEUTROPHILS # BLD AUTO: 4.4 K/UL
NEUTROPHILS NFR BLD: 44.3 %
OVALOCYTES BLD QL SMEAR: ABNORMAL
PHOSPHATE SERPL-MCNC: 3.7 MG/DL
PLATELET # BLD AUTO: 180 K/UL
PLATELET BLD QL SMEAR: ABNORMAL
PMV BLD AUTO: 8.6 FL
POIKILOCYTOSIS BLD QL SMEAR: SLIGHT
POLYCHROMASIA BLD QL SMEAR: ABNORMAL
POTASSIUM SERPL-SCNC: 3.7 MMOL/L
PROT SERPL-MCNC: 6.7 G/DL
RBC # BLD AUTO: 4.03 M/UL
RETICS/RBC NFR AUTO: 5.4 %
SODIUM SERPL-SCNC: 141 MMOL/L
TARGETS BLD QL SMEAR: ABNORMAL
WBC # BLD AUTO: 10.17 K/UL

## 2017-05-03 PROCEDURE — 83735 ASSAY OF MAGNESIUM: CPT

## 2017-05-03 PROCEDURE — 25000003 PHARM REV CODE 250: Performed by: PHYSICIAN ASSISTANT

## 2017-05-03 PROCEDURE — 25000003 PHARM REV CODE 250: Performed by: HOSPITALIST

## 2017-05-03 PROCEDURE — 85045 AUTOMATED RETICULOCYTE COUNT: CPT

## 2017-05-03 PROCEDURE — 25000242 PHARM REV CODE 250 ALT 637 W/ HCPCS: Performed by: NURSE PRACTITIONER

## 2017-05-03 PROCEDURE — 80053 COMPREHEN METABOLIC PANEL: CPT

## 2017-05-03 PROCEDURE — 11000001 HC ACUTE MED/SURG PRIVATE ROOM

## 2017-05-03 PROCEDURE — 85025 COMPLETE CBC W/AUTO DIFF WBC: CPT

## 2017-05-03 PROCEDURE — 84100 ASSAY OF PHOSPHORUS: CPT

## 2017-05-03 PROCEDURE — 94761 N-INVAS EAR/PLS OXIMETRY MLT: CPT

## 2017-05-03 PROCEDURE — 94640 AIRWAY INHALATION TREATMENT: CPT

## 2017-05-03 PROCEDURE — 63600175 PHARM REV CODE 636 W HCPCS: Performed by: HOSPITALIST

## 2017-05-03 PROCEDURE — 27000221 HC OXYGEN, UP TO 24 HOURS

## 2017-05-03 PROCEDURE — 97803 MED NUTRITION INDIV SUBSEQ: CPT

## 2017-05-03 PROCEDURE — 99233 SBSQ HOSP IP/OBS HIGH 50: CPT | Mod: ,,, | Performed by: HOSPITALIST

## 2017-05-03 RX ORDER — HYDROMORPHONE HYDROCHLORIDE 2 MG/1
4 TABLET ORAL EVERY 4 HOURS PRN
Status: DISCONTINUED | OUTPATIENT
Start: 2017-05-03 | End: 2017-05-05 | Stop reason: HOSPADM

## 2017-05-03 RX ORDER — HYDROMORPHONE HYDROCHLORIDE 1 MG/ML
1 INJECTION, SOLUTION INTRAMUSCULAR; INTRAVENOUS; SUBCUTANEOUS EVERY 6 HOURS PRN
Status: DISCONTINUED | OUTPATIENT
Start: 2017-05-03 | End: 2017-05-05 | Stop reason: HOSPADM

## 2017-05-03 RX ADMIN — LISINOPRIL 40 MG: 20 TABLET ORAL at 09:05

## 2017-05-03 RX ADMIN — CARBAMAZEPINE 400 MG: 200 TABLET ORAL at 09:05

## 2017-05-03 RX ADMIN — PANTOPRAZOLE SODIUM 600 MG: 40 TABLET, DELAYED RELEASE ORAL at 08:05

## 2017-05-03 RX ADMIN — OXYCODONE HYDROCHLORIDE 30 MG: 5 TABLET ORAL at 08:05

## 2017-05-03 RX ADMIN — HEPARIN SODIUM 5000 UNITS: 5000 INJECTION, SOLUTION INTRAVENOUS; SUBCUTANEOUS at 02:05

## 2017-05-03 RX ADMIN — IBUPROFEN 400 MG: 400 TABLET, FILM COATED ORAL at 05:05

## 2017-05-03 RX ADMIN — IBUPROFEN 400 MG: 400 TABLET, FILM COATED ORAL at 08:05

## 2017-05-03 RX ADMIN — Medication 3 ML: at 10:05

## 2017-05-03 RX ADMIN — AMLODIPINE BESYLATE 10 MG: 10 TABLET ORAL at 09:05

## 2017-05-03 RX ADMIN — CARVEDILOL 25 MG: 25 TABLET, FILM COATED ORAL at 08:05

## 2017-05-03 RX ADMIN — BACLOFEN 10 MG: 10 TABLET ORAL at 08:05

## 2017-05-03 RX ADMIN — FLUTICASONE PROPIONATE 2 SPRAY: 50 SPRAY, METERED NASAL at 09:05

## 2017-05-03 RX ADMIN — FLUOXETINE 40 MG: 20 CAPSULE ORAL at 09:05

## 2017-05-03 RX ADMIN — IPRATROPIUM BROMIDE AND ALBUTEROL SULFATE 3 ML: .5; 3 SOLUTION RESPIRATORY (INHALATION) at 07:05

## 2017-05-03 RX ADMIN — HYDROMORPHONE HYDROCHLORIDE 3 MG: 1 INJECTION, SOLUTION INTRAMUSCULAR; INTRAVENOUS; SUBCUTANEOUS at 05:05

## 2017-05-03 RX ADMIN — CETIRIZINE HYDROCHLORIDE 5 MG: 5 TABLET, FILM COATED ORAL at 09:05

## 2017-05-03 RX ADMIN — HYDRALAZINE HYDROCHLORIDE 100 MG: 50 TABLET ORAL at 08:05

## 2017-05-03 RX ADMIN — AMPICILLIN SODIUM AND SULBACTAM SODIUM 3 G: 2; 1 INJECTION, POWDER, FOR SOLUTION INTRAMUSCULAR; INTRAVENOUS at 03:05

## 2017-05-03 RX ADMIN — OXYCODONE HYDROCHLORIDE 30 MG: 5 TABLET ORAL at 12:05

## 2017-05-03 RX ADMIN — STANDARDIZED SENNA CONCENTRATE AND DOCUSATE SODIUM 1 TABLET: 8.6; 5 TABLET, FILM COATED ORAL at 08:05

## 2017-05-03 RX ADMIN — STANDARDIZED SENNA CONCENTRATE AND DOCUSATE SODIUM 1 TABLET: 8.6; 5 TABLET, FILM COATED ORAL at 09:05

## 2017-05-03 RX ADMIN — OXYCODONE HYDROCHLORIDE 30 MG: 5 TABLET ORAL at 11:05

## 2017-05-03 RX ADMIN — Medication 3 ML: at 02:05

## 2017-05-03 RX ADMIN — GABAPENTIN 600 MG: 300 CAPSULE ORAL at 09:05

## 2017-05-03 RX ADMIN — HEPARIN SODIUM 5000 UNITS: 5000 INJECTION, SOLUTION INTRAVENOUS; SUBCUTANEOUS at 05:05

## 2017-05-03 RX ADMIN — HYDROMORPHONE HYDROCHLORIDE 3 MG: 1 INJECTION, SOLUTION INTRAMUSCULAR; INTRAVENOUS; SUBCUTANEOUS at 10:05

## 2017-05-03 RX ADMIN — AMPICILLIN SODIUM AND SULBACTAM SODIUM 3 G: 2; 1 INJECTION, POWDER, FOR SOLUTION INTRAMUSCULAR; INTRAVENOUS at 08:05

## 2017-05-03 RX ADMIN — IPRATROPIUM BROMIDE AND ALBUTEROL SULFATE 3 ML: .5; 3 SOLUTION RESPIRATORY (INHALATION) at 12:05

## 2017-05-03 RX ADMIN — HEPARIN SODIUM 5000 UNITS: 5000 INJECTION, SOLUTION INTRAVENOUS; SUBCUTANEOUS at 08:05

## 2017-05-03 RX ADMIN — IPRATROPIUM BROMIDE AND ALBUTEROL SULFATE 3 ML: .5; 3 SOLUTION RESPIRATORY (INHALATION) at 11:05

## 2017-05-03 RX ADMIN — SODIUM CHLORIDE: 0.9 INJECTION, SOLUTION INTRAVENOUS at 09:05

## 2017-05-03 RX ADMIN — BACLOFEN 10 MG: 10 TABLET ORAL at 09:05

## 2017-05-03 RX ADMIN — HYDROMORPHONE HYDROCHLORIDE 3 MG: 1 INJECTION, SOLUTION INTRAMUSCULAR; INTRAVENOUS; SUBCUTANEOUS at 09:05

## 2017-05-03 RX ADMIN — FLUTICASONE FUROATE AND VILANTEROL TRIFENATATE 1 PUFF: 100; 25 POWDER RESPIRATORY (INHALATION) at 09:05

## 2017-05-03 RX ADMIN — PANTOPRAZOLE SODIUM 600 MG: 40 TABLET, DELAYED RELEASE ORAL at 09:05

## 2017-05-03 RX ADMIN — HYDRALAZINE HYDROCHLORIDE 100 MG: 50 TABLET ORAL at 02:05

## 2017-05-03 RX ADMIN — AMPICILLIN SODIUM AND SULBACTAM SODIUM 3 G: 2; 1 INJECTION, POWDER, FOR SOLUTION INTRAMUSCULAR; INTRAVENOUS at 09:05

## 2017-05-03 RX ADMIN — DIPHENHYDRAMINE HYDROCHLORIDE 25 MG: 25 CAPSULE ORAL at 10:05

## 2017-05-03 RX ADMIN — Medication 3 ML: at 05:05

## 2017-05-03 RX ADMIN — IBUPROFEN 400 MG: 400 TABLET, FILM COATED ORAL at 02:05

## 2017-05-03 RX ADMIN — PREDNISONE 40 MG: 20 TABLET ORAL at 09:05

## 2017-05-03 RX ADMIN — IPRATROPIUM BROMIDE AND ALBUTEROL SULFATE 3 ML: .5; 3 SOLUTION RESPIRATORY (INHALATION) at 04:05

## 2017-05-03 RX ADMIN — HYDROMORPHONE HYDROCHLORIDE 3 MG: 1 INJECTION, SOLUTION INTRAMUSCULAR; INTRAVENOUS; SUBCUTANEOUS at 01:05

## 2017-05-03 RX ADMIN — HYDRALAZINE HYDROCHLORIDE 100 MG: 50 TABLET ORAL at 05:05

## 2017-05-03 RX ADMIN — GABAPENTIN 600 MG: 300 CAPSULE ORAL at 08:05

## 2017-05-03 RX ADMIN — CARVEDILOL 25 MG: 25 TABLET, FILM COATED ORAL at 09:05

## 2017-05-03 NOTE — SUBJECTIVE & OBJECTIVE
Interval History: patient is still complaining up diffuse pain, 8/10; also of a cough;     Review of Systems   Constitutional: Negative for chills and fever.   HENT: Negative for rhinorrhea and sore throat.    Eyes: Negative for pain and discharge.   Respiratory: Positive for cough and shortness of breath. Negative for wheezing.    Cardiovascular: Negative for chest pain and leg swelling.   Gastrointestinal: Negative for abdominal distention, abdominal pain, blood in stool, nausea and vomiting.   Endocrine: Negative for cold intolerance and heat intolerance.   Genitourinary: Negative for dysuria and flank pain.   Neurological: Negative for dizziness and numbness.   Psychiatric/Behavioral: Negative for behavioral problems and confusion.     Objective:     Vital Signs (Most Recent):  Temp: 98.9 °F (37.2 °C) (05/02/17 1930)  Pulse: 89 (05/02/17 1930)  Resp: 16 (05/02/17 1930)  BP: 130/75 (05/02/17 1930)  SpO2: 95 % (05/02/17 1930) Vital Signs (24h Range):  Temp:  [98 °F (36.7 °C)-99 °F (37.2 °C)] 98.9 °F (37.2 °C)  Pulse:  [77-97] 89  Resp:  [15-18] 16  SpO2:  [95 %-99 %] 95 %  BP: (124-177)/(44-85) 130/75     Weight: 136.1 kg (300 lb)  Body mass index is 54.87 kg/(m^2).  No intake or output data in the 24 hours ending 05/02/17 2230   Physical Exam   Constitutional: She is oriented to person, place, and time. She appears well-developed and well-nourished.   HENT:   Head: Normocephalic and atraumatic.   Eyes: Conjunctivae are normal. Pupils are equal, round, and reactive to light.   Neck: Normal range of motion. No thyromegaly present.   Cardiovascular: Normal rate, regular rhythm and normal heart sounds.    Pulmonary/Chest: No respiratory distress. She has no wheezes.   Abdominal: Soft. She exhibits no distension. There is no tenderness.   Musculoskeletal: Normal range of motion. She exhibits no edema.   Neurological: She is alert and oriented to person, place, and time.   Skin: No erythema. No pallor.        Significant Labs:   CBC:     Recent Labs  Lab 05/01/17  0414 05/02/17  0350   WBC 15.12* 10.57   HGB 10.0* 9.6*   HCT 30.6* 29.1*    168     CMP:     Recent Labs  Lab 05/01/17  0414 05/02/17  0350    139   K 4.0 3.6    102   CO2 29 29   GLU 97 148*   BUN 10 10   CREATININE 0.7 0.7   CALCIUM 8.6* 8.1*   PROT 6.4 6.3   ALBUMIN 2.8* 2.6*   BILITOT 0.7 0.3   ALKPHOS 147* 141*   AST 18 15   ALT 18 19   ANIONGAP 9 8   EGFRNONAA >60.0 >60.0       Significant Imaging:     CT Chest wo-        Multifocal subsegmental areas of patchy consolidation and groundglass attenuation within both lower lobes and the left upper lobe. Findings are most concerning for multifocal pneumonia with differential considerations including pulmonary hemorrhage and noninfectious inflammatory processes.  Recommend followup study in 4-6 weeks to ensure resolution

## 2017-05-03 NOTE — PROGRESS NOTES
[facility]  Adult Nutrition  Progress Note    SUMMARY     Pt reports great appetite. Observed high sugar food items at bedside. Encouraged pt to eat high sugar snacks with protein, to maintain BG levels stable. Denies n/v or chewing/swallowing difficulty. No family present at bedside.     Recommendations    1. Continue current diet order.     RD to monitor and follow up.     Goals: PO intake >/= 75%  Nutrition Goal Status: new  Communication of RD Recs: reviewed with RN    Continuum of Care Plan    Nursing Team: obtain weekly wts    Reason for Assessment    Reason for Assessment: RD follow-up  Diagnosis:  (sickle cell disease with pain )  Relevent Medical History: HTN, trigeminal neuralgia   Interdisciplinary Rounds: did not attend  Nutrition Discharge Planning: Adequate PO intake to meet daily needs.     Nutrition Prescription Ordered    Current Diet Order: Adult regular      Nutrition Risk Screen     Nutrition Risk Screen: no indicators present    Nutrition/Diet History    Patient Reported Diet/Restrictions/Preferences: general  Typical Food/Fluid Intake: Enjoys tuna sandwiches, fruit, sugar cookies, and quesadillas.  Food Preferences: no cultural/Mandaeism limitaitons reported     Labs/Tests/Procedures/Meds    Pertinent Labs Reviewed: reviewed  Pertinent Labs Comments: BG + lytes WNL.   Pertinent Medications Reviewed: reviewed  Pertinent Medications Comments: steroid, senna, lisinopril, IVF    Physical Findings    Overall Physical Appearance: obese  Tubes:  (-)  Oral/Mouth Cavity: WDL  Skin: intact    Anthropometrics    Height (inches): 62.01 in  Weight Method: Stated  Weight (kg): (!) 136.1 kg  Ideal Body Weight (IBW), Female: 110.05 lb  % Ideal Body Weight, Female (lb): 272.65 lb  BMI (kg/m2): 54.87  BMI Grade: greater than 40 - morbid obesity  Usual Body Weight (UBW), kg:  (Pt reports UBW is 250# however she's been current wt for >6m)  Weight Loss:  (no wt loss  reported)\    Estimated/Assessed Needs    Weight Used For Calorie Calculations: (!) 136.1 kg (300 lb 0.7 oz)   Height (cm): 157.5 cm  Energy Need Method: [place] [employer] [place]-St Jeor (2000 kcal/day)  Indirect Calorimetry:                       RMR ([place] and [place]-St. Jeor Equation): 1991.13  Weight Used For Protein Calculations: 49.9 kg (110 lb) (1.5-2.0 gm/kg of IBW )  Protein Requirements:  gm/day  Fluid Need Method: RDA Method (2000 mL/day or per MD)    Assessment and Plan    No new Assessment & Plan notes have been filed under this hospital service since the last note was generated.  Service: Nutrition    Monitor and Evaluation    Food and Nutrient Intake: food and beverage intake  Food and Nutrient Adminstration: diet order  Anthropometric Measurements: weight change  Biochemical Data, Medical Tests and Procedures: electrolyte and renal panel, glucose/endocrine profile  Nutrition-Focused Physical Findings: overall appearance     Nutrition Risk    Level of Risk:  (f/u 1x/week)    Nutrition Follow-Up    RD Follow-up?: Yes     Angie Lu, ARTURO, LDN

## 2017-05-03 NOTE — PLAN OF CARE
Problem: Patient Care Overview  Goal: Plan of Care Review  Outcome: Ongoing (interventions implemented as appropriate)  Pt remained free from falls/injuries. VSS. Afebrile. AAO x 4. Pt c/o of pain. Administered PRN pain medications as ordered. Skin intact. Pt is in bed with side rails up x 3, wheel locked, bed in lowest position, call light in reach.

## 2017-05-03 NOTE — PLAN OF CARE
Problem: Patient Care Overview  Goal: Plan of Care Review  Outcome: Ongoing (interventions implemented as appropriate)  Pt pain managed with prn medication as ordered. Pt verbalized readiness to go home this shift. Pt has NS gtt at 100ml/hr. Monitoring.

## 2017-05-03 NOTE — PROGRESS NOTES
"[facility] Hospital Medicine  Progress Note    Patient Name: Nazanin Malone  MRN: [id number]  Patient Class: IP- Inpatient   Admission Date: 4/16/2017  Length of Stay: 16 days  Attending Physician: Miguel Tanner MD  Primary Care Provider: Primary Doctor [facility] Medicine Team: [facility] MED A Miguel Tanner MD    Subjective:     Principal Problem:Sickle-cell disease with pain    HPI:  Nazanin Malone is a 37 y.o. female who  has a PMH of Hypertension; Sickle cell-beta thalassemia disease with pain; and Trigeminal neuralgia. The patient presented to [facility] on 4/16/2017 due to worsening right leg pain over the last week as well as a cough productive of green mucous. She states she came to ED 2 days ago for her cough and was prescribed Augmentin. She says the mucous is no longer green, but she still feels all "stuffed up and congested." She has had to be admitted a few other times due to sickle pain crisis, but rarely needs transfusions. Persistently asking for more pain medications,     Hospital Course:  4/25- audible wheezing; afebrile; switched to solumedrol    4/26/17- patient is no longer wheezing and feels much better today    4/27/17- states cough is still bothering here and leads to pain; still requiring oxygen and will likely need to go home on that;     4/28- CT chest shows multifocal PNA; concerning for acute chest syndrome, abg, re-consult heme/onc    4/29- patient feeling better today; currently off oxygen    5/1- patient complaining of left sided weakness and numbness; upper extremity U/S negative for DVT; MRI pending       Interval History: patient is still complaining up diffuse pain, 8/10; also of a cough;     Review of Systems   Constitutional: Negative for chills and fever.   HENT: Negative for rhinorrhea and sore throat.    Eyes: Negative for pain and discharge.   Respiratory: Positive for cough and shortness of breath. Negative for wheezing.  "   Cardiovascular: Negative for chest pain and leg swelling.   Gastrointestinal: Negative for abdominal distention, abdominal pain, blood in stool, nausea and vomiting.   Endocrine: Negative for cold intolerance and heat intolerance.   Genitourinary: Negative for dysuria and flank pain.   Neurological: Negative for dizziness and numbness.   Psychiatric/Behavioral: Negative for behavioral problems and confusion.     Objective:     Vital Signs (Most Recent):  Temp: 98.9 °F (37.2 °C) (05/02/17 1930)  Pulse: 89 (05/02/17 1930)  Resp: 16 (05/02/17 1930)  BP: 130/75 (05/02/17 1930)  SpO2: 95 % (05/02/17 1930) Vital Signs (24h Range):  Temp:  [98 °F (36.7 °C)-99 °F (37.2 °C)] 98.9 °F (37.2 °C)  Pulse:  [77-97] 89  Resp:  [15-18] 16  SpO2:  [95 %-99 %] 95 %  BP: (124-177)/(44-85) 130/75     Weight: 136.1 kg (300 lb)  Body mass index is 54.87 kg/(m^2).  No intake or output data in the 24 hours ending 05/02/17 2235   Physical Exam   Constitutional: She is oriented to person, place, and time. She appears well-developed and well-nourished.   HENT:   Head: Normocephalic and atraumatic.   Eyes: Conjunctivae are normal. Pupils are equal, round, and reactive to light.   Neck: Normal range of motion. No thyromegaly present.   Cardiovascular: Normal rate, regular rhythm and normal heart sounds.    Pulmonary/Chest: No respiratory distress. She has no wheezes.   Abdominal: Soft. She exhibits no distension. There is no tenderness.   Musculoskeletal: Normal range of motion. She exhibits no edema.   Neurological: She is alert and oriented to person, place, and time.   Skin: No erythema. No pallor.       Significant Labs:   CBC:     Recent Labs  Lab 05/01/17 0414 05/02/17  0350   WBC 15.12* 10.57   HGB 10.0* 9.6*   HCT 30.6* 29.1*    168     CMP:     Recent Labs  Lab 05/01/17 0414 05/02/17  0350    139   K 4.0 3.6    102   CO2 29 29   GLU 97 148*   BUN 10 10   CREATININE 0.7 0.7   CALCIUM 8.6* 8.1*   PROT 6.4 6.3    ALBUMIN 2.8* 2.6*   BILITOT 0.7 0.3   ALKPHOS 147* 141*   AST 18 15   ALT 18 19   ANIONGAP 9 8   EGFRNONAA >60.0 >60.0       Significant Imaging:     CT Chest wo-        Multifocal subsegmental areas of patchy consolidation and groundglass attenuation within both lower lobes and the left upper lobe. Findings are most concerning for multifocal pneumonia with differential considerations including pulmonary hemorrhage and noninfectious inflammatory processes.  Recommend followup study in 4-6 weeks to ensure resolution      Assessment/Plan:   Sickle Cell pain crisis  # opioid dependence  - pt having complaints typical of her pain crisis.  - MRI of the right HIP did not reveal any evidence of avascular necrosis  - Dilaudid 3mg IV q3 prn; stopping fentanyl patch  - Seen by Hematology appreciate recs; pain currently well controlled      # Asthma exacerbation  # Acute respiratory failure with hypoxia  - improving   - PO prednisone 40 mg   - will start advair; needs pulmonology follow up  - will also add mucinex      # Sepsis due to suspected Strep PNA  # Multi-focal PNA  On Unasyn  - WBC normal today  - acapella every 4 hrs PRN       # Left Sided weakness and numbness and swelling  - UE U/S negative for DVT  - MRI brain and C-spine pending      #Bronchitis  Duonebs PRN  Start Prednisone 50mg daily  Advair      # Benign Essential HTN  - continue home Norvasc  - coreg 25 mg PO BID   - on lisinopril 40 mg daily ; scheduled hydralazine 100 mg PO TID      # RLE swelling and pain  - BLE ultrasound negative for DVT  MRI of the Hip revealed periostitis, negative for Avascular necrosis      # Morbid Obesity  Body mass index is 54.87 kg/(m^2).       PT/OT          DVT/GI ppx  - Hep SQ  - regular diet      Discharge Disposition- pending pain improvement, most likely by Friday         VTE Risk Mitigation         Ordered     heparin (porcine) injection 5,000 Units  Every 8 hours     Route:  Subcutaneous        04/16/17 0813     Medium  Risk of VTE  Once      04/16/17 0813     Place sequential compression device  Until discontinued      04/16/17 0813     Place JOSE LUIS hose  Until discontinued      04/16/17 0813          Miguel Tanner MD  Department of Hospital Medicine   [facility]

## 2017-05-04 LAB
ANISOCYTOSIS BLD QL SMEAR: SLIGHT
BASOPHILS # BLD AUTO: 0.02 K/UL
BASOPHILS NFR BLD: 0.2 %
DACRYOCYTES BLD QL SMEAR: ABNORMAL
DIFFERENTIAL METHOD: ABNORMAL
EOSINOPHIL # BLD AUTO: 0.3 K/UL
EOSINOPHIL NFR BLD: 3.1 %
ERYTHROCYTE [DISTWIDTH] IN BLOOD BY AUTOMATED COUNT: 23.5 %
HCT VFR BLD AUTO: 31.1 %
HGB BLD-MCNC: 9.8 G/DL
HYPOCHROMIA BLD QL SMEAR: ABNORMAL
LYMPHOCYTES # BLD AUTO: 3.9 K/UL
LYMPHOCYTES NFR BLD: 42 %
MCH RBC QN AUTO: 23.6 PG
MCHC RBC AUTO-ENTMCNC: 31.5 %
MCV RBC AUTO: 75 FL
MONOCYTES # BLD AUTO: 0.9 K/UL
MONOCYTES NFR BLD: 9.7 %
NEUTROPHILS # BLD AUTO: 4 K/UL
NEUTROPHILS NFR BLD: 45 %
OVALOCYTES BLD QL SMEAR: ABNORMAL
PLATELET # BLD AUTO: 189 K/UL
PMV BLD AUTO: 9.1 FL
POIKILOCYTOSIS BLD QL SMEAR: SLIGHT
POLYCHROMASIA BLD QL SMEAR: ABNORMAL
RBC # BLD AUTO: 4.16 M/UL
RETICS/RBC NFR AUTO: 5.1 %
TARGETS BLD QL SMEAR: ABNORMAL
WBC # BLD AUTO: 9.18 K/UL

## 2017-05-04 PROCEDURE — 63600175 PHARM REV CODE 636 W HCPCS: Performed by: HOSPITALIST

## 2017-05-04 PROCEDURE — 11000001 HC ACUTE MED/SURG PRIVATE ROOM

## 2017-05-04 PROCEDURE — 99233 SBSQ HOSP IP/OBS HIGH 50: CPT | Mod: ,,, | Performed by: HOSPITALIST

## 2017-05-04 PROCEDURE — 85025 COMPLETE CBC W/AUTO DIFF WBC: CPT

## 2017-05-04 PROCEDURE — 25000003 PHARM REV CODE 250: Performed by: HOSPITALIST

## 2017-05-04 PROCEDURE — 85045 AUTOMATED RETICULOCYTE COUNT: CPT

## 2017-05-04 PROCEDURE — 25000003 PHARM REV CODE 250: Performed by: PHYSICIAN ASSISTANT

## 2017-05-04 PROCEDURE — 94761 N-INVAS EAR/PLS OXIMETRY MLT: CPT

## 2017-05-04 PROCEDURE — 94640 AIRWAY INHALATION TREATMENT: CPT

## 2017-05-04 PROCEDURE — 27000221 HC OXYGEN, UP TO 24 HOURS

## 2017-05-04 PROCEDURE — 25000242 PHARM REV CODE 250 ALT 637 W/ HCPCS: Performed by: NURSE PRACTITIONER

## 2017-05-04 RX ADMIN — SODIUM CHLORIDE: 0.9 INJECTION, SOLUTION INTRAVENOUS at 04:05

## 2017-05-04 RX ADMIN — AMPICILLIN SODIUM AND SULBACTAM SODIUM 3 G: 2; 1 INJECTION, POWDER, FOR SOLUTION INTRAMUSCULAR; INTRAVENOUS at 04:05

## 2017-05-04 RX ADMIN — Medication 3 ML: at 05:05

## 2017-05-04 RX ADMIN — AMPICILLIN SODIUM AND SULBACTAM SODIUM 3 G: 2; 1 INJECTION, POWDER, FOR SOLUTION INTRAMUSCULAR; INTRAVENOUS at 09:05

## 2017-05-04 RX ADMIN — CARBAMAZEPINE 400 MG: 200 TABLET ORAL at 09:05

## 2017-05-04 RX ADMIN — PREDNISONE 40 MG: 20 TABLET ORAL at 09:05

## 2017-05-04 RX ADMIN — CARVEDILOL 25 MG: 25 TABLET, FILM COATED ORAL at 09:05

## 2017-05-04 RX ADMIN — FLUOXETINE 40 MG: 20 CAPSULE ORAL at 09:05

## 2017-05-04 RX ADMIN — GABAPENTIN 600 MG: 300 CAPSULE ORAL at 09:05

## 2017-05-04 RX ADMIN — IBUPROFEN 400 MG: 400 TABLET, FILM COATED ORAL at 02:05

## 2017-05-04 RX ADMIN — IBUPROFEN 400 MG: 400 TABLET, FILM COATED ORAL at 09:05

## 2017-05-04 RX ADMIN — BACLOFEN 10 MG: 10 TABLET ORAL at 09:05

## 2017-05-04 RX ADMIN — IPRATROPIUM BROMIDE AND ALBUTEROL SULFATE 3 ML: .5; 3 SOLUTION RESPIRATORY (INHALATION) at 07:05

## 2017-05-04 RX ADMIN — HEPARIN SODIUM 5000 UNITS: 5000 INJECTION, SOLUTION INTRAVENOUS; SUBCUTANEOUS at 05:05

## 2017-05-04 RX ADMIN — LACTULOSE 20 G: 20 SOLUTION ORAL at 09:05

## 2017-05-04 RX ADMIN — CETIRIZINE HYDROCHLORIDE 5 MG: 5 TABLET, FILM COATED ORAL at 09:05

## 2017-05-04 RX ADMIN — PANTOPRAZOLE SODIUM 600 MG: 40 TABLET, DELAYED RELEASE ORAL at 09:05

## 2017-05-04 RX ADMIN — STANDARDIZED SENNA CONCENTRATE AND DOCUSATE SODIUM 1 TABLET: 8.6; 5 TABLET, FILM COATED ORAL at 09:05

## 2017-05-04 RX ADMIN — IBUPROFEN 400 MG: 400 TABLET, FILM COATED ORAL at 05:05

## 2017-05-04 RX ADMIN — IPRATROPIUM BROMIDE AND ALBUTEROL SULFATE 3 ML: .5; 3 SOLUTION RESPIRATORY (INHALATION) at 10:05

## 2017-05-04 RX ADMIN — Medication 3 ML: at 09:05

## 2017-05-04 RX ADMIN — Medication 3 ML: at 02:05

## 2017-05-04 RX ADMIN — HYDRALAZINE HYDROCHLORIDE 100 MG: 50 TABLET ORAL at 05:05

## 2017-05-04 RX ADMIN — LISINOPRIL 40 MG: 20 TABLET ORAL at 09:05

## 2017-05-04 RX ADMIN — AMLODIPINE BESYLATE 10 MG: 10 TABLET ORAL at 09:05

## 2017-05-04 RX ADMIN — IPRATROPIUM BROMIDE AND ALBUTEROL SULFATE 3 ML: .5; 3 SOLUTION RESPIRATORY (INHALATION) at 02:05

## 2017-05-04 RX ADMIN — HYDRALAZINE HYDROCHLORIDE 100 MG: 50 TABLET ORAL at 02:05

## 2017-05-04 RX ADMIN — HYDROMORPHONE HYDROCHLORIDE 4 MG: 2 TABLET ORAL at 04:05

## 2017-05-04 RX ADMIN — IPRATROPIUM BROMIDE AND ALBUTEROL SULFATE 3 ML: .5; 3 SOLUTION RESPIRATORY (INHALATION) at 11:05

## 2017-05-04 RX ADMIN — IPRATROPIUM BROMIDE AND ALBUTEROL SULFATE 3 ML: .5; 3 SOLUTION RESPIRATORY (INHALATION) at 04:05

## 2017-05-04 RX ADMIN — FLUTICASONE FUROATE AND VILANTEROL TRIFENATATE 1 PUFF: 100; 25 POWDER RESPIRATORY (INHALATION) at 09:05

## 2017-05-04 RX ADMIN — HEPARIN SODIUM 5000 UNITS: 5000 INJECTION, SOLUTION INTRAVENOUS; SUBCUTANEOUS at 02:05

## 2017-05-04 RX ADMIN — HYDRALAZINE HYDROCHLORIDE 100 MG: 50 TABLET ORAL at 09:05

## 2017-05-04 RX ADMIN — FLUTICASONE PROPIONATE 2 SPRAY: 50 SPRAY, METERED NASAL at 09:05

## 2017-05-04 NOTE — PLAN OF CARE
Problem: Patient Care Overview  Goal: Plan of Care Review  Outcome: Ongoing (interventions implemented as appropriate)  Patient remained in stable condition this shift. No complaints of pain or discomfort and no PRN pain medications given. Patient refusing IVF stating that it is making her retain fluid.  She stated she will still get her ABT, but does not want the continuous fluids. Bed in lowest and locked position, call light in reach, able to make needs known to staff.

## 2017-05-04 NOTE — PROGRESS NOTES
"[facility] Hospital Medicine  Progress Note    Patient Name: Nazanin Malone  MRN: [id number]  Patient Class: IP- Inpatient   Admission Date: 4/16/2017  Length of Stay: 17 days  Attending Physician: Miguel Tanner MD  Primary Care Provider: Primary Doctor [facility] Medicine [place]: [facility] A Miguel Tanner MD    Subjective:     Principal Problem:Sickle-cell disease with pain    HPI:  Nazanin Malone is a 37 y.o. female who  has a PMH of Hypertension; Sickle cell-beta thalassemia disease with pain; and Trigeminal neuralgia. The patient presented to [facility] on 4/16/2017 due to worsening right leg pain over the last week as well as a cough productive of green mucous. She states she came to ED 2 days ago for her cough and was prescribed Augmentin. She says the mucous is no longer green, but she still feels all "stuffed up and congested." She has had to be admitted a few other times due to sickle pain crisis, but rarely needs transfusions. Persistently asking for more pain medications,     Hospital Course:  4/25- audible wheezing; afebrile; switched to solumedrol    4/26/17- patient is no longer wheezing and feels much better today    4/27/17- states cough is still bothering here and leads to pain; still requiring oxygen and will likely need to go home on that;     4/28- CT chest shows multifocal PNA; concerning for acute chest syndrome, abg, re-consult heme/onc    4/29- patient feeling better today; currently off oxygen    5/1- patient complaining of left sided weakness and numbness; upper extremity U/S negative for DVT; MRI pending       Subjective & objective note cannot be loaded without a specified hospital service.    Assessment/Plan:   Sickle Cell pain crisis  # opioid dependence  - pt having complaints typical of her pain crisis.  - MRI of the right HIP did not reveal any evidence of avascular necrosis  - Dilaudid 3mg IV q3 prn; stopping fentanyl patch  - Seen by " Hematology appreciate recs; pain currently well controlled  - switched to PO       # Asthma exacerbation  # Acute respiratory failure with hypoxia  - improving   - PO prednisone 40 mg   - will start advair; needs pulmonology follow up  - will also add mucinex      # Sepsis due to suspected Strep PNA  # Multi-focal PNA  On Unasyn  - WBC normal today  - acapella every 4 hrs PRN       # Left Sided weakness and numbness and swelling  - UE U/S negative for DVT  - MRI brain and C-spine: no acute stroke and spinal canal stenosis       #Bronchitis  Duonebs PRN  Start Prednisone 50mg daily  Advair      # Benign Essential HTN  - continue home Norvasc  - coreg 25 mg PO BID   - on lisinopril 40 mg daily ; scheduled hydralazine 100 mg PO TID      # RLE swelling and pain  - BLE ultrasound negative for DVT  MRI of the Hip revealed periostitis, negative for Avascular necrosis      # Morbid Obesity  Body mass index is 54.87 kg/(m^2).       PT/OT          DVT/GI ppx  - Hep SQ  - regular diet      Discharge Disposition- pending pain improvement, most likely by Friday         VTE Risk Mitigation         Ordered     heparin (porcine) injection 5,000 Units  Every 8 hours     Route:  Subcutaneous        04/16/17 0813     Medium Risk of VTE  Once      04/16/17 0813     Place sequential compression device  Until discontinued      04/16/17 0813     Place JOSE LUIS hose  Until discontinued      04/16/17 0813          Miguel Tanner MD  Department of Hospital Medicine   [facility]

## 2017-05-05 VITALS
HEIGHT: 62 IN | TEMPERATURE: 98 F | BODY MASS INDEX: 53.92 KG/M2 | WEIGHT: 293 LBS | SYSTOLIC BLOOD PRESSURE: 135 MMHG | HEART RATE: 80 BPM | DIASTOLIC BLOOD PRESSURE: 77 MMHG | OXYGEN SATURATION: 97 % | RESPIRATION RATE: 16 BRPM

## 2017-05-05 LAB
ALBUMIN SERPL BCP-MCNC: 3.1 G/DL
ALP SERPL-CCNC: 124 U/L
ALT SERPL W/O P-5'-P-CCNC: 18 U/L
ANION GAP SERPL CALC-SCNC: 9 MMOL/L
ANISOCYTOSIS BLD QL SMEAR: SLIGHT
AST SERPL-CCNC: 20 U/L
BASOPHILS # BLD AUTO: 0.02 K/UL
BASOPHILS NFR BLD: 0.2 %
BILIRUB SERPL-MCNC: 0.4 MG/DL
BUN SERPL-MCNC: 8 MG/DL
CALCIUM SERPL-MCNC: 9.1 MG/DL
CHLORIDE SERPL-SCNC: 103 MMOL/L
CO2 SERPL-SCNC: 28 MMOL/L
CREAT SERPL-MCNC: 0.8 MG/DL
DIFFERENTIAL METHOD: ABNORMAL
EOSINOPHIL # BLD AUTO: 0.2 K/UL
EOSINOPHIL NFR BLD: 2.1 %
ERYTHROCYTE [DISTWIDTH] IN BLOOD BY AUTOMATED COUNT: 21.6 %
EST. GFR  (AFRICAN AMERICAN): >60 ML/MIN/1.73 M^2
EST. GFR  (NON AFRICAN AMERICAN): >60 ML/MIN/1.73 M^2
GLUCOSE SERPL-MCNC: 125 MG/DL
HCT VFR BLD AUTO: 30 %
HGB BLD-MCNC: 10 G/DL
HYPOCHROMIA BLD QL SMEAR: ABNORMAL
LYMPHOCYTES # BLD AUTO: 3 K/UL
LYMPHOCYTES NFR BLD: 33.6 %
MAGNESIUM SERPL-MCNC: 2 MG/DL
MCH RBC QN AUTO: 23.8 PG
MCHC RBC AUTO-ENTMCNC: 33.3 %
MCV RBC AUTO: 71 FL
MONOCYTES # BLD AUTO: 0.8 K/UL
MONOCYTES NFR BLD: 9.2 %
NEUTROPHILS # BLD AUTO: 4.8 K/UL
NEUTROPHILS NFR BLD: 54.9 %
OVALOCYTES BLD QL SMEAR: ABNORMAL
PHOSPHATE SERPL-MCNC: 4.2 MG/DL
PLATELET # BLD AUTO: 190 K/UL
PMV BLD AUTO: 8.9 FL
POIKILOCYTOSIS BLD QL SMEAR: SLIGHT
POLYCHROMASIA BLD QL SMEAR: ABNORMAL
POTASSIUM SERPL-SCNC: 3.6 MMOL/L
PROT SERPL-MCNC: 7 G/DL
RBC # BLD AUTO: 4.21 M/UL
RETICS/RBC NFR AUTO: 4.2 %
SODIUM SERPL-SCNC: 140 MMOL/L
TARGETS BLD QL SMEAR: ABNORMAL
WBC # BLD AUTO: 8.95 K/UL

## 2017-05-05 PROCEDURE — 36415 COLL VENOUS BLD VENIPUNCTURE: CPT

## 2017-05-05 PROCEDURE — 80053 COMPREHEN METABOLIC PANEL: CPT

## 2017-05-05 PROCEDURE — 99238 HOSP IP/OBS DSCHRG MGMT 30/<: CPT | Mod: ,,, | Performed by: HOSPITALIST

## 2017-05-05 PROCEDURE — 85045 AUTOMATED RETICULOCYTE COUNT: CPT

## 2017-05-05 PROCEDURE — 84100 ASSAY OF PHOSPHORUS: CPT

## 2017-05-05 PROCEDURE — 25000003 PHARM REV CODE 250: Performed by: PHYSICIAN ASSISTANT

## 2017-05-05 PROCEDURE — 63600175 PHARM REV CODE 636 W HCPCS: Performed by: HOSPITALIST

## 2017-05-05 PROCEDURE — 25000003 PHARM REV CODE 250: Performed by: HOSPITALIST

## 2017-05-05 PROCEDURE — 25000242 PHARM REV CODE 250 ALT 637 W/ HCPCS: Performed by: NURSE PRACTITIONER

## 2017-05-05 PROCEDURE — 83735 ASSAY OF MAGNESIUM: CPT

## 2017-05-05 PROCEDURE — 27000221 HC OXYGEN, UP TO 24 HOURS

## 2017-05-05 PROCEDURE — 94761 N-INVAS EAR/PLS OXIMETRY MLT: CPT

## 2017-05-05 PROCEDURE — 99900035 HC TECH TIME PER 15 MIN (STAT)

## 2017-05-05 PROCEDURE — 94640 AIRWAY INHALATION TREATMENT: CPT

## 2017-05-05 PROCEDURE — 85025 COMPLETE CBC W/AUTO DIFF WBC: CPT

## 2017-05-05 RX ORDER — RAMELTEON 8 MG/1
8 TABLET ORAL NIGHTLY PRN
Qty: 30 TABLET | Refills: 0 | Status: SHIPPED | OUTPATIENT
Start: 2017-05-05 | End: 2017-05-22

## 2017-05-05 RX ORDER — AMOXICILLIN 250 MG
1 CAPSULE ORAL 2 TIMES DAILY
Status: ON HOLD | COMMUNITY
Start: 2017-05-05 | End: 2019-08-01 | Stop reason: SDUPTHER

## 2017-05-05 RX ORDER — LISINOPRIL 40 MG/1
40 TABLET ORAL DAILY
Qty: 30 TABLET | Refills: 3 | Status: SHIPPED | OUTPATIENT
Start: 2017-05-05 | End: 2017-05-22

## 2017-05-05 RX ORDER — HYDRALAZINE HYDROCHLORIDE 100 MG/1
100 TABLET, FILM COATED ORAL EVERY 8 HOURS
Qty: 90 TABLET | Refills: 3 | Status: SHIPPED | OUTPATIENT
Start: 2017-05-05 | End: 2017-10-05

## 2017-05-05 RX ORDER — CARVEDILOL 25 MG/1
25 TABLET ORAL 2 TIMES DAILY
Qty: 60 TABLET | Refills: 3 | Status: SHIPPED | OUTPATIENT
Start: 2017-05-05 | End: 2017-10-05

## 2017-05-05 RX ORDER — AMLODIPINE BESYLATE 10 MG/1
10 TABLET ORAL DAILY
Qty: 30 TABLET | Refills: 3 | Status: SHIPPED | OUTPATIENT
Start: 2017-05-05 | End: 2017-07-31 | Stop reason: SDUPTHER

## 2017-05-05 RX ORDER — HYDROMORPHONE HYDROCHLORIDE 4 MG/1
4 TABLET ORAL EVERY 4 HOURS PRN
Qty: 60 TABLET | Refills: 0 | Status: SHIPPED | OUTPATIENT
Start: 2017-05-05 | End: 2017-05-22

## 2017-05-05 RX ORDER — BUDESONIDE AND FORMOTEROL FUMARATE DIHYDRATE 160; 4.5 UG/1; UG/1
2 AEROSOL RESPIRATORY (INHALATION) EVERY 12 HOURS
Qty: 10.2 G | Refills: 2 | Status: SHIPPED | OUTPATIENT
Start: 2017-05-05 | End: 2018-03-12 | Stop reason: SDUPTHER

## 2017-05-05 RX ADMIN — AMLODIPINE BESYLATE 10 MG: 10 TABLET ORAL at 10:05

## 2017-05-05 RX ADMIN — CARBAMAZEPINE 400 MG: 200 TABLET ORAL at 10:05

## 2017-05-05 RX ADMIN — FLUTICASONE PROPIONATE 2 SPRAY: 50 SPRAY, METERED NASAL at 10:05

## 2017-05-05 RX ADMIN — Medication 3 ML: at 06:05

## 2017-05-05 RX ADMIN — STANDARDIZED SENNA CONCENTRATE AND DOCUSATE SODIUM 1 TABLET: 8.6; 5 TABLET, FILM COATED ORAL at 10:05

## 2017-05-05 RX ADMIN — FLUOXETINE 40 MG: 20 CAPSULE ORAL at 10:05

## 2017-05-05 RX ADMIN — CETIRIZINE HYDROCHLORIDE 5 MG: 5 TABLET, FILM COATED ORAL at 10:05

## 2017-05-05 RX ADMIN — LISINOPRIL 40 MG: 20 TABLET ORAL at 10:05

## 2017-05-05 RX ADMIN — CARVEDILOL 25 MG: 25 TABLET, FILM COATED ORAL at 10:05

## 2017-05-05 RX ADMIN — BACLOFEN 10 MG: 10 TABLET ORAL at 10:05

## 2017-05-05 RX ADMIN — AMPICILLIN SODIUM AND SULBACTAM SODIUM 3 G: 2; 1 INJECTION, POWDER, FOR SOLUTION INTRAMUSCULAR; INTRAVENOUS at 10:05

## 2017-05-05 RX ADMIN — PANTOPRAZOLE SODIUM 600 MG: 40 TABLET, DELAYED RELEASE ORAL at 10:05

## 2017-05-05 RX ADMIN — HYDRALAZINE HYDROCHLORIDE 100 MG: 50 TABLET ORAL at 06:05

## 2017-05-05 RX ADMIN — DIPHENHYDRAMINE HYDROCHLORIDE 25 MG: 25 CAPSULE ORAL at 10:05

## 2017-05-05 RX ADMIN — GABAPENTIN 600 MG: 300 CAPSULE ORAL at 10:05

## 2017-05-05 RX ADMIN — IBUPROFEN 400 MG: 400 TABLET, FILM COATED ORAL at 06:05

## 2017-05-05 RX ADMIN — IPRATROPIUM BROMIDE AND ALBUTEROL SULFATE 3 ML: .5; 3 SOLUTION RESPIRATORY (INHALATION) at 12:05

## 2017-05-05 RX ADMIN — FLUTICASONE FUROATE AND VILANTEROL TRIFENATATE 1 PUFF: 100; 25 POWDER RESPIRATORY (INHALATION) at 10:05

## 2017-05-05 RX ADMIN — AMPICILLIN SODIUM AND SULBACTAM SODIUM 3 G: 2; 1 INJECTION, POWDER, FOR SOLUTION INTRAMUSCULAR; INTRAVENOUS at 04:05

## 2017-05-05 NOTE — PLAN OF CARE
05/05/17 1033   Discharge Reassessment   Assessment Type Discharge Planning Reassessment   Can the patient answer the patient profile reliably? Yes, cognitively intact   How does the patient rate their overall health at the present time? Fair   Describe the patient's ability to walk at the present time. No restrictions   How often would a person be available to care for the patient? Occasionally   Number of comorbid conditions (as recorded on the chart) Three   During the past month, has the patient often been bothered by feeling down, depressed or hopeless? No   During the past month, has the patient often been bothered by little interest or pleasure in doing things? No   Discharge plan remains the same: Yes   Discharge Plan A Home with family  (Patient medically stable for discharge to home today with family with no needs identified)

## 2017-05-05 NOTE — SUBJECTIVE & OBJECTIVE
Interval History: patient seen and examined at bedside. Tolerating po dilaudid with IV prn.     Review of Systems   Constitutional: Negative for chills and fever.   HENT: Negative for rhinorrhea and sore throat.    Eyes: Negative for pain and discharge.   Respiratory: Positive for cough and shortness of breath. Negative for wheezing.    Cardiovascular: Negative for chest pain and leg swelling.   Gastrointestinal: Negative for abdominal distention, abdominal pain, blood in stool, nausea and vomiting.   Endocrine: Negative for cold intolerance and heat intolerance.   Genitourinary: Negative for dysuria and flank pain.   Neurological: Negative for dizziness and numbness.   Psychiatric/Behavioral: Negative for behavioral problems and confusion.     Objective:     Vital Signs (Most Recent):  Temp: 100 °F (37.8 °C) (05/04/17 1622)  Pulse: 91 (05/04/17 1954)  Resp: 20 (05/04/17 1954)  BP: 131/87 (05/04/17 1622)  SpO2: (!) 94 % (05/04/17 1954) Vital Signs (24h Range):  Temp:  [98.5 °F (36.9 °C)-100 °F (37.8 °C)] 100 °F (37.8 °C)  Pulse:  [80-91] 91  Resp:  [16-20] 20  SpO2:  [94 %-98 %] 94 %  BP: (102-168)/(61-92) 131/87     Weight: 136.1 kg (300 lb)  Body mass index is 54.87 kg/(m^2).  No intake or output data in the 24 hours ending 05/04/17 2050   Physical Exam   Constitutional: She is oriented to person, place, and time. She appears well-developed and well-nourished.   HENT:   Head: Normocephalic and atraumatic.   Eyes: Conjunctivae are normal. Pupils are equal, round, and reactive to light.   Neck: Normal range of motion. No thyromegaly present.   Cardiovascular: Normal rate, regular rhythm and normal heart sounds.    Pulmonary/Chest: No respiratory distress. She has no wheezes.   Abdominal: Soft. She exhibits no distension. There is no tenderness.   Musculoskeletal: Normal range of motion. She exhibits no edema.   Neurological: She is alert and oriented to person, place, and time.   Skin: No erythema. No pallor.        Significant Labs:   CBC:     Recent Labs  Lab 05/03/17  0512 05/04/17  0411   WBC 10.17 9.18   HGB 9.2* 9.8*   HCT 29.1* 31.1*    189     CMP:     Recent Labs  Lab 05/03/17  0512      K 3.7      CO2 31*   GLU 91   BUN 10   CREATININE 0.7   CALCIUM 9.0   PROT 6.7   ALBUMIN 2.9*   BILITOT 0.4   ALKPHOS 137*   AST 16   ALT 20   ANIONGAP 5*   EGFRNONAA >60.0       Significant Imaging:     CT Chest wo-        Multifocal subsegmental areas of patchy consolidation and groundglass attenuation within both lower lobes and the left upper lobe. Findings are most concerning for multifocal pneumonia with differential considerations including pulmonary hemorrhage and noninfectious inflammatory processes.  Recommend followup study in 4-6 weeks to ensure resolution

## 2017-05-05 NOTE — NURSING
Discharge instruction given pt verbalized understanding of instruction given. Iv removed pt left via w/c

## 2017-05-05 NOTE — PROGRESS NOTES
"[facility] Hospital Medicine  Progress Note    Patient Name: Nazanin Malone  MRN: [id number]  Patient Class: IP- Inpatient   Admission Date: 4/16/2017  Length of Stay: 18 days  Attending Physician: Miguel Tanner MD  Primary Care Provider: Primary Doctor [facility] Medicine Team: [facility]OSP MED A Miguel Tanner MD    Subjective:     Principal Problem:Sickle-cell disease with pain    HPI:  Nazanin Malone is a 37 y.o. female who  has a PMH of Hypertension; Sickle cell-beta thalassemia disease with pain; and Trigeminal neuralgia. The patient presented to [facility] on 4/16/2017 due to worsening right leg pain over the last week as well as a cough productive of green mucous. She states she came to ED 2 days ago for her cough and was prescribed Augmentin. She says the mucous is no longer green, but she still feels all "stuffed up and congested." She has had to be admitted a few other times due to sickle pain crisis, but rarely needs transfusions. Persistently asking for more pain medications,     Hospital Course:  4/25- audible wheezing; afebrile; switched to solumedrol    4/26/17- patient is no longer wheezing and feels much better today    4/27/17- states cough is still bothering here and leads to pain; still requiring oxygen and will likely need to go home on that;     4/28- CT chest shows multifocal PNA; concerning for acute chest syndrome, abg, re-consult heme/onc    4/29- patient feeling better today; currently off oxygen    5/1- patient complaining of left sided weakness and numbness; upper extremity U/S negative for DVT; MRI pending     05/04-  WBC normal, afebrile. On PO Dilaudid      Interval History: patient seen and examined at bedside. Tolerating po dilaudid with IV prn.     Review of Systems   Constitutional: Negative for chills and fever.   HENT: Negative for rhinorrhea and sore throat.    Eyes: Negative for pain and discharge.   Respiratory: Positive for cough and " shortness of breath. Negative for wheezing.    Cardiovascular: Negative for chest pain and leg swelling.   Gastrointestinal: Negative for abdominal distention, abdominal pain, blood in stool, nausea and vomiting.   Endocrine: Negative for cold intolerance and heat intolerance.   Genitourinary: Negative for dysuria and flank pain.   Neurological: Negative for dizziness and numbness.   Psychiatric/Behavioral: Negative for behavioral problems and confusion.     Objective:     Vital Signs (Most Recent):  Temp: 100 °F (37.8 °C) (05/04/17 1622)  Pulse: 91 (05/04/17 1954)  Resp: 20 (05/04/17 1954)  BP: 131/87 (05/04/17 1622)  SpO2: (!) 94 % (05/04/17 1954) Vital Signs (24h Range):  Temp:  [98.5 °F (36.9 °C)-100 °F (37.8 °C)] 100 °F (37.8 °C)  Pulse:  [80-91] 91  Resp:  [16-20] 20  SpO2:  [94 %-98 %] 94 %  BP: (102-168)/(61-92) 131/87     Weight: 136.1 kg (300 lb)  Body mass index is 54.87 kg/(m^2).  No intake or output data in the 24 hours ending 05/04/17 2050   Physical Exam   Constitutional: She is oriented to person, place, and time. She appears well-developed and well-nourished.   HENT:   Head: Normocephalic and atraumatic.   Eyes: Conjunctivae are normal. Pupils are equal, round, and reactive to light.   Neck: Normal range of motion. No thyromegaly present.   Cardiovascular: Normal rate, regular rhythm and normal heart sounds.    Pulmonary/Chest: No respiratory distress. She has no wheezes.   Abdominal: Soft. She exhibits no distension. There is no tenderness.   Musculoskeletal: Normal range of motion. She exhibits no edema.   Neurological: She is alert and oriented to person, place, and time.   Skin: No erythema. No pallor.       Significant Labs:   CBC:     Recent Labs  Lab 05/03/17 0512 05/04/17  0411   WBC 10.17 9.18   HGB 9.2* 9.8*   HCT 29.1* 31.1*    189     CMP:     Recent Labs  Lab 05/03/17  0512      K 3.7      CO2 31*   GLU 91   BUN 10   CREATININE 0.7   CALCIUM 9.0   PROT 6.7   ALBUMIN  2.9*   BILITOT 0.4   ALKPHOS 137*   AST 16   ALT 20   ANIONGAP 5*   EGFRNONAA >60.0       Significant Imaging:     CT Chest wo-        Multifocal subsegmental areas of patchy consolidation and groundglass attenuation within both lower lobes and the left upper lobe. Findings are most concerning for multifocal pneumonia with differential considerations including pulmonary hemorrhage and noninfectious inflammatory processes.  Recommend followup study in 4-6 weeks to ensure resolution      Assessment/Plan:   Sickle Cell pain crisis  # opioid dependence  - pt having complaints typical of her pain crisis.  - MRI of the right HIP did not reveal any evidence of avascular necrosis  - Dilaudid 1 mg IV q 6 hrs PRN stopping fentanyl patch  - Seen by Hematology appreciate recs; pain currently well controlled  - switched to PO Dilaudid with IV PRN breakthrough       # Asthma exacerbation  # Acute respiratory failure with hypoxia  - improving   - PO prednisone 40 mg x 5 days, last day 05/04/17  - will start advair; needs pulmonology follow up  - will also add mucinex      # Sepsis due to suspected Strep PNA  # Multi-focal PNA  On Unasyn, finished 7 day course   - WBC normal today  - acapella every 4 hrs PRN       # Left Sided weakness and numbness and swelling  - UE U/S negative for DVT  - MRI brain and C-spine: no acute stroke and spinal canal stenosis       #Bronchitis  Duonebs PRN  Start Prednisone 50mg daily  Advair      # Benign Essential HTN  - continue home Norvasc  - coreg 25 mg PO BID   - on lisinopril 40 mg daily ; scheduled hydralazine 100 mg PO TID      # RLE swelling and pain  - BLE ultrasound negative for DVT  MRI of the Hip revealed periostitis, negative for Avascular necrosis      # Morbid Obesity  Body mass index is 54.87 kg/(m^2).       PT/OT          DVT/GI ppx  - Hep SQ  - regular diet       Discharge Disposition- tomorrow    VTE Risk Mitigation         Ordered     heparin (porcine) injection 5,000 Units  Every  8 hours     Route:  Subcutaneous        04/16/17 0813     Medium Risk of VTE  Once      04/16/17 0813     Place sequential compression device  Until discontinued      04/16/17 0813     Place JOSE LUIS hose  Until discontinued      04/16/17 0813          Miguel Tanner MD  Department of Hospital Medicine   [facility]

## 2017-05-05 NOTE — DISCHARGE SUMMARY
"DISCHARGE SUMMARY  Hospital Medicine    Team: [facility] HOSP MED A    Patient Name: Nazanin Malone  YOB: 1980    Admit Date: 4/16/2017    Discharge Date: 05/05/2017    Discharge Attending Physician: No att. 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       Discharged Condition: admit problems have stabilized        HOSPITAL COURSE:    Initial Presentation:     37 y.o. female who  has a PMH of Hypertension; Sickle cell-beta thalassemia disease with pain; and Trigeminal neuralgia. The patient presented to [facility] on 4/16/2017 due to worsening right leg pain over the last week as well as a cough productive of green mucous. She states she came to ED 2 days ago for her cough and was prescribed Augmentin. She says the mucous is no longer green, but she still feels all "stuffed up and congested." She has had to be admitted a few other times due to sickle pain crisis, but rarely needs transfusions.     Course of Principle Problem for Admission:    Sickle Cell pain crisis  # opioid dependence  - pt having complaints typical of her pain crisis.  - MRI of the right HIP did not reveal any evidence of avascular necrosis  - Dilaudid 1 mg IV q 6 hrs PRN stopping fentanyl patch  - Seen by Hematology appreciate recs; pain currently well controlled  - switched to PO " Dilaudid with IV PRN breakthrough     Other Medical Problems Addressed in the Hospital:    # Sepsis due to suspected Strep PNA  # Multi-focal PNA   Unasyn, finished 7 day course   - WBC normal today  - acapella every 4 hrs PRN       # Left Sided weakness and numbness and swelling  - UE U/S negative for DVT  - MRI brain and C-spine: no acute stroke and spinal canal stenosis       #Bronchitis  Duonebs PRN  Finished a 5 day course of Prednisone   Started on Advair      # Benign Essential HTN  - well controlled   - continue home Norvasc  - coreg 25 mg PO BID   - on lisinopril 40 mg daily ; scheduled hydralazine 100 mg PO TID    Moderate Canal stenosis:  Recommend outpatient PT/OT      # RLE swelling and pain  - BLE ultrasound negative for DVT  MRI of the Hip revealed periostitis, negative for Avascular necrosis      # Morbid Obesity  Body mass index is 54.87 kg/(m^2).     CONSULTS: Hematology Oncology, infectious diseases    Other Pertinent Lab Findings:    Results for TARAN BELLAMY (MRN [id number]) as of 5/5/2017 18:42   Ref. Range 5/1/2017 04:14 5/2/2017 03:50 5/3/2017 05:12 5/4/2017 04:11 5/5/2017 04:15   WBC Latest Ref Range: 3.90 - 12.70 K/uL 15.12 (H) 10.57 10.17 9.18 8.95   WBC-Corrected for NRBC's Latest Ref Range: 3.90 - 12.70 K/uL 9.33       RBC Latest Ref Range: 4.00 - 5.40 M/uL 4.26 4.03 4.03 4.16 4.21   Hemoglobin Latest Ref Range: 12.0 - 16.0 g/dL 10.0 (L) 9.6 (L) 9.2 (L) 9.8 (L) 10.0 (L)   Hematocrit Latest Ref Range: 37.0 - 48.5 % 30.6 (L) 29.1 (L) 29.1 (L) 31.1 (L) 30.0 (L)   MCV Latest Ref Range: 82 - 98 fL 72 (L) 72 (L) 72 (L) 75 (L) 71 (L)   MCH Latest Ref Range: 27.0 - 31.0 pg 23.5 (L) 23.8 (L) 22.8 (L) 23.6 (L) 23.8 (L)   MCHC Latest Ref Range: 32.0 - 36.0 % 32.7 33.0 31.6 (L) 31.5 (L) 33.3   RDW Latest Ref Range: 11.5 - 14.5 % 21.6 (H) 21.5 (H) 21.0 (H) 23.5 (H) 21.6 (H)   Platelets Latest Ref Range: 150 - 350 K/uL 175 168 180 189 190   MPV Latest Ref Range: 9.2 - 12.9 fL 8.9 (L) 8.6 (L) 8.6  (L) 9.1 (L) 8.9 (L)   Gran% Latest Ref Range: 38.0 - 73.0 % 60.0 54.8 44.3 45.0 54.9   Gran # Latest Ref Range: 1.8 - 7.7 K/uL  5.7 4.4 4.0 4.8   Lymph% Latest Ref Range: 18.0 - 48.0 % 30.0 31.0 41.3 42.0 33.6   Lymph # Latest Ref Range: 1.0 - 4.8 K/uL CANCELED 3.3 4.2 3.9 3.0   Mono% Latest Ref Range: 4.0 - 15.0 % 4.0 8.7 10.4 9.7 9.2   Mono # Latest Ref Range: 0.3 - 1.0 K/uL CANCELED 0.9 1.1 (H) 0.9 0.8   Eosinophil% Latest Ref Range: 0.0 - 8.0 % 4.0 5.4 3.9 3.1 2.1   Eos # Latest Ref Range: 0.0 - 0.5 K/uL CANCELED 0.6 (H) 0.4 0.3 0.2   Basophil% Latest Ref Range: 0.0 - 1.9 % 0.0 0.1 0.1 0.2 0.2   Baso # Latest Ref Range: 0.00 - 0.20 K/uL CANCELED 0.01 0.01 0.02 0.02   Myelocytes Latest Units: % 2.0       nRBC Latest Ref Range: 0 /100 WBC 62@L=100 (A)       Ovalocytes Unknown Occasional  Occasional Occasional Occasional   Aniso Unknown Slight Moderate Slight Slight Slight   Poik Unknown Slight Slight Slight Slight Slight   Poly Unknown Occasional Occasional Occasional Occasional Occasional   Hypo Unknown Occasional Occasional Occasional Occasional Occasional   Platelet Estimate Unknown Appears normal Appears normal Appears normal     Large/Giant Platelets Unknown Present       Target Cells Unknown Occasional Occasional Occasional Occasional Occasional   Tear Drop Cells Unknown Occasional  Occasional Occasional    Retic Latest Ref Range: 0.5 - 2.5 % 6.4 (H) 5.8 (H) 5.4 (H) 5.1 (H) 4.2 (H)   Sodium Latest Ref Range: 136 - 145 mmol/L 138 139 141  140   Potassium Latest Ref Range: 3.5 - 5.1 mmol/L 4.0 3.6 3.7  3.6   Chloride Latest Ref Range: 95 - 110 mmol/L 100 102 105  103   CO2 Latest Ref Range: 23 - 29 mmol/L 29 29 31 (H)  28   Anion Gap Latest Ref Range: 8 - 16 mmol/L 9 8 5 (L)  9   BUN, Bld Latest Ref Range: 6 - 20 mg/dL 10 10 10  8   Creatinine Latest Ref Range: 0.5 - 1.4 mg/dL 0.7 0.7 0.7  0.8   eGFR if non African American Latest Ref Range: >60 mL/min/1.73 m^2 >60.0 >60.0 >60.0  >60.0   eGFR if African  American Latest Ref Range: >60 mL/min/1.73 m^2 >60.0 >60.0 >60.0  >60.0   Glucose Latest Ref Range: 70 - 110 mg/dL 97 148 (H) 91  125 (H)   Calcium Latest Ref Range: 8.7 - 10.5 mg/dL 8.6 (L) 8.1 (L) 9.0  9.1   Phosphorus Latest Ref Range: 2.7 - 4.5 mg/dL 4.2 3.8 3.7  4.2   Magnesium Latest Ref Range: 1.6 - 2.6 mg/dL 2.2 2.3 2.2  2.0   Alkaline Phosphatase Latest Ref Range: 55 - 135 U/L 147 (H) 141 (H) 137 (H)  124   Total Protein Latest Ref Range: 6.0 - 8.4 g/dL 6.4 6.3 6.7  7.0   Albumin Latest Ref Range: 3.5 - 5.2 g/dL 2.8 (L) 2.6 (L) 2.9 (L)  3.1 (L)   Total Bilirubin Latest Ref Range: 0.1 - 1.0 mg/dL 0.7 0.3 0.4  0.4   AST Latest Ref Range: 10 - 40 U/L 18 15 16  20   ALT Latest Ref Range: 10 - 44 U/L 18 19 20  18       Pertinent/Significant Diagnostic Studies:    CT scan of the chest:   Multifocal subsegmental areas of patchy consolidation and groundglass attenuation within both lower lobes and the left upper lobe. Findings are most concerning for multifocal pneumonia with differential considerations including pulmonary hemorrhage and noninfectious inflammatory processes.  Recommend followup study in 4-6 weeks to ensure resolution.    Additional findings include:  - Irregular contour and abnormal attenuation of the spleen concerning for sequela of remote infarcts/evolving autosplenectomy.  - Mild body wall edema.    MRI Brain:   No acute intracranial abnormalities. Specifically, no acute infarction or enhancing mass lesion.    Diffuse marrow signal hypointensity keeping with sequela of red marrow reconversion noting patient's reported history of sickle cell anemia.    Suspect air fluid level within the right maxillary antrum, a finding that can be seen with acute sinusitis. Additional sinus disease as above.    MRI Cervical spine:   Moderate posterior disc protrusion at C5-C6 that effaces the anterior thecal sac and abuts and compresses the ventral aspect of the spinal cord contributing to moderate spinal canal  stenosis.  Adjacent spinal cord demonstrates normal signal intensity without finding to suggest edema.    Diffuse marrow signal hypointensity, likely related to red marrow reconversion noting patient reported history of sickle cell anemia.    Special Treatments/Procedures:   None     Disposition:  Home        Future Scheduled Appointments:  Future Appointments  Date Time Provider Department Center   5/12/2017 2:00 PM PHYSICIAN[facility] BREANNA Elizalde PCW       Follow-up Plans from This Hospitalization:  F/u with [facility] care clinic     Last CBC/BMP/HgbA1c (if applicable):  Recent Results (from the past 336 hour(s))   CBC with Auto Differential    Collection Time: 05/05/17  4:15 AM   Result Value Ref Range    WBC 8.95 3.90 - 12.70 K/uL    Hemoglobin 10.0 (L) 12.0 - 16.0 g/dL    Hematocrit 30.0 (L) 37.0 - 48.5 %    Platelets 190 150 - 350 K/uL   CBC with Auto Differential    Collection Time: 05/04/17  4:11 AM   Result Value Ref Range    WBC 9.18 3.90 - 12.70 K/uL    Hemoglobin 9.8 (L) 12.0 - 16.0 g/dL    Hematocrit 31.1 (L) 37.0 - 48.5 %    Platelets 189 150 - 350 K/uL   CBC with Auto Differential    Collection Time: 05/03/17  5:12 AM   Result Value Ref Range    WBC 10.17 3.90 - 12.70 K/uL    Hemoglobin 9.2 (L) 12.0 - 16.0 g/dL    Hematocrit 29.1 (L) 37.0 - 48.5 %    Platelets 180 150 - 350 K/uL     No results found for this or any previous visit (from the past 336 hour(s)).  No results found for: HGBA1C    Discharge Medication List:     Medication List      START taking these medications          amlodipine 10 MG tablet   Commonly known as:  NORVASC   Take 1 tablet (10 mg total) by mouth once daily.       budesonide-formoterol 160-4.5 mcg 160-4.5 mcg/actuation Hfaa   Commonly known as:  SYMBICORT   Inhale 2 puffs into the lungs every 12 (twelve) hours. Controller       carvedilol 25 MG tablet   Commonly known as:  COREG   Take 1 tablet (25 mg total) by mouth 2 (two) times daily.       hydrALAZINE 100  MG tablet   Commonly known as:  APRESOLINE   Take 1 tablet (100 mg total) by mouth every 8 (eight) hours.       HYDROmorphone 4 MG tablet   Commonly known as:  DILAUDID   Take 1 tablet (4 mg total) by mouth every 4 (four) hours as needed.       lisinopril 40 MG tablet   Commonly known as:  PRINIVIL,ZESTRIL   Take 1 tablet (40 mg total) by mouth once daily.       ramelteon 8 mg tablet   Commonly known as:  ROZEREM   Take 1 tablet (8 mg total) by mouth nightly as needed for Insomnia.       senna-docusate 8.6-50 mg 8.6-50 mg per tablet   Commonly known as:  PERICOLACE   Take 1 tablet by mouth 2 (two) times daily.         CONTINUE taking these medications          baclofen 10 MG tablet   Commonly known as:  LIORESAL       benzonatate 100 MG capsule   Commonly known as:  TESSALON   Take 1 capsule (100 mg total) by mouth 3 (three) times daily as needed for Cough.       carbamazepine 200 mg tablet   Commonly known as:  TEGRETOL   Take 2 tablets (400 mg total) by mouth 2 (two) times daily.       fluoxetine 40 MG capsule   Commonly known as:  PROZAC       gabapentin 300 MG capsule   Commonly known as:  NEURONTIN       ondansetron 8 MG Tbdl   Commonly known as:  ZOFRAN-ODT   Take 1 tablet (8 mg total) by mouth every 6 (six) hours as needed.         STOP taking these medications          amoxicillin-clavulanate 875-125mg 875-125 mg per tablet   Commonly known as:  AUGMENTIN       hydrOXYzine pamoate 25 MG Cap   Commonly known as:  VISTARIL       hydrOXYzine pamoate 50 MG Cap   Commonly known as:  VISTARIL       oxycodone-acetaminophen  mg per tablet   Commonly known as:  PERCOCET       predniSONE 20 MG tablet   Commonly known as:  DELTASONE            Where to Get Your Medications      These medications were sent to [facility]/pharmacy #[id number] - [place], [place] - [address]  [address], [place] [address]     Phone:  [phone]     amlodipine 10 MG tablet    budesonide-formoterol 160-4.5 mcg 160-4.5  mcg/actuation Hfaa    carvedilol 25 MG tablet    hydrALAZINE 100 MG tablet    lisinopril 40 MG tablet    ramelteon 8 mg tablet         You can get these medications from any pharmacy     Bring a paper prescription for each of these medications     HYDROmorphone 4 MG tablet       You don't need a prescription for these medications     senna-docusate 8.6-50 mg 8.6-50 mg per tablet             Patient Instructions:  No discharge procedures on file.    Signing Physician:  Miguel Tanner MD

## 2017-05-08 ENCOUNTER — TELEPHONE (OUTPATIENT)
Dept: HEMATOLOGY/ONCOLOGY | Facility: CLINIC | Age: 37
End: 2017-05-08

## 2017-05-08 DIAGNOSIS — D57.00 SICKLE-CELL DISEASE WITH PAIN: Primary | ICD-10-CM

## 2017-05-08 NOTE — TELEPHONE ENCOUNTER
----- Message from Arnav Clark sent at 5/8/2017  9:01 AM CDT -----  Contact: self   Pt is calling to get a refill for (Percocet) , pt will be using [facility].  Contact number [phone]

## 2017-05-08 NOTE — TELEPHONE ENCOUNTER
Returned call to patient to inform her that she needs an appointment with him and labs before she can get refill. Appointments were made for Friday 5/12/17 @ 1pm labs and 3pm office visit. Pt verbalizes understanding.

## 2017-05-09 ENCOUNTER — PATIENT OUTREACH (OUTPATIENT)
Dept: ADMINISTRATIVE | Facility: CLINIC | Age: 37
End: 2017-05-09
Payer: MEDICAID

## 2017-05-09 NOTE — PATIENT INSTRUCTIONS
"  Sickle Cell Anemia  You have sickle cell anemia, which is also called sickle cell disease. That means your red blood cells may lose their normal round shape and become shaped like a half-moon. These cells can't carry oxygen as well as normal, round blood cells. Sickle cell anemia runs in families, and commonly affects -Americans and certain other ethnic groups. Sickle cell anemia is a genetic disease you get from a mutated (changed) gene passed down from each parent. Sickle cell "trait" happens when you get one mutated gene from one of your parents. Sickle cell trait usually does not have symptoms and is not serious. Neither the disease nor the trait can be passed from person to person by coughing or touching. Sickle cell anemia can be controlled, but not cured. Most newborns are now tested for sickle cell disease at birth.  A sickle cell crisis happens when many sickle cells stick together and pile up in the blood vessels. During a sickle cell crisis, you may have severe pain in the chest, stomach, arms, and legs. The crisis can last for hours, or even days, and can happen several times a year.  Home care  Tips for taking care of yourself at home include:  · Watch for sores (ulcers) on your legs. These are caused by poor blood flow and are a sign that the sickle cell anemia is not under control.  · If you snore or sometimes stop breathing during sleep, be sure to tell your healthcare provider.  · Get treatment for any other medical condition, such as diabetes. This is important to avoid complications of sickle cell anemia.  · Get early prenatal care if you are pregnant or plan to get pregnant.  · If you plan to travel by air, go in pressurized aircraft only. Check with your healthcare provider about any needed safety steps if you must travel in a nonpressurized aircraft.  · Talk to your healthcare provider about what kind of pain medicine you should use.  · Drink plenty of water, especially during warm " weather.  · Get treated for any infection (cold, flu, skin infection) as soon as it happens.  · Wear warm clothes in cold weather or in air-conditioned rooms.  Lifestyle changes  Suggestions for changes include:  · Limit alcohol intake to no more than one drink per day.  · Stop smoking. Go to a stop-smoking program to improve your chances of quitting.  · Exercise regularly but not to the point that you become extremely tired. Drink plenty of fluids when you exercise.  · Avoid very strenuous activities, such as rough contact sports.  · Don't swim in cold water.   Follow-up care  Make a follow-up appointment as directed by our staff. Regular follow-up visits are very important.  When to call your healthcare provider  Call your healthcare provider right away if you have any of the following:  · Swollen hands or feet  · Sudden paleness in the skin or nail beds  · Yellow color of the skin or eyes (jaundice)  · Fever or signs of infection  · Swelling in the belly  · Sudden tiredness with no interest in what is going on  · Erection that won't go away  · Trouble hearing or seeing  · Weakness on one side of the body  · Sudden change in speech  · Headache  · Trouble breathing  · Joint, stomach, chest, or muscle pain  · Limping   Date Last Reviewed: 2/21/2016  © 3365-4622 [employer] [place]. [address] [place] [address], [place] [address]. All rights reserved. This information is not intended as a substitute for professional medical care. Always follow your healthcare professional's instructions.

## 2017-05-10 DIAGNOSIS — D57.1 HB-SS DISEASE WITHOUT CRISIS: Primary | ICD-10-CM

## 2017-05-12 ENCOUNTER — TELEPHONE (OUTPATIENT)
Dept: INTERNAL MEDICINE | Facility: CLINIC | Age: 37
End: 2017-05-12

## 2017-05-12 NOTE — TELEPHONE ENCOUNTER
Spoke with pt to reschedule priority clinic appt and pt stated she would see her out of network PCP.

## 2017-05-12 NOTE — TELEPHONE ENCOUNTER
----- Message from Hakeem Leal sent at 5/12/2017 12:07 PM CDT -----  Contact: self/[phone] cell  Type: Sooner appointment than  is able to schedule    When is the first available appointment? 05/12/2017    What is the nature of the appointment? Hospital f/u    What appointment type: hospital f/u    Comments: Pt can't make the appt scheduled for 2 pm today and would like a call back to reschedule.

## 2017-05-15 ENCOUNTER — TELEPHONE (OUTPATIENT)
Dept: HEMATOLOGY/ONCOLOGY | Facility: CLINIC | Age: 37
End: 2017-05-15

## 2017-05-15 NOTE — TELEPHONE ENCOUNTER
----- Message from Xena Rouse sent at 5/15/2017  2:40 PM CDT -----  Contact: self: [phone]  Pt called and would like to know if she can be seen before her appt on 05/22/17.    Pt can be reached at [phone].       Thank you

## 2017-05-15 NOTE — TELEPHONE ENCOUNTER
Returned call to patient. She asked if we had any openings before her 5/22 appointment. None available. She said she will keep that appointment.

## 2017-05-22 ENCOUNTER — LAB VISIT (OUTPATIENT)
Dept: LAB | Facility: HOSPITAL | Age: 37
End: 2017-05-22
Attending: INTERNAL MEDICINE
Payer: MEDICAID

## 2017-05-22 ENCOUNTER — OFFICE VISIT (OUTPATIENT)
Dept: HEMATOLOGY/ONCOLOGY | Facility: CLINIC | Age: 37
End: 2017-05-22
Payer: MEDICAID

## 2017-05-22 VITALS
TEMPERATURE: 99 F | DIASTOLIC BLOOD PRESSURE: 99 MMHG | SYSTOLIC BLOOD PRESSURE: 146 MMHG | HEART RATE: 99 BPM | WEIGHT: 293 LBS | BODY MASS INDEX: 53.92 KG/M2 | HEIGHT: 62 IN

## 2017-05-22 DIAGNOSIS — D57.40 SICKLE CELL BETA THALASSEMIA: Primary | ICD-10-CM

## 2017-05-22 DIAGNOSIS — D57.00 SICKLE-CELL DISEASE WITH PAIN: ICD-10-CM

## 2017-05-22 DIAGNOSIS — D50.9 IRON DEFICIENCY ANEMIA, UNSPECIFIED IRON DEFICIENCY ANEMIA TYPE: ICD-10-CM

## 2017-05-22 DIAGNOSIS — D57.40 SICKLE CELL BETA THALASSEMIA: ICD-10-CM

## 2017-05-22 LAB
ABO + RH BLD: NORMAL
ALBUMIN SERPL BCP-MCNC: 3.7 G/DL
ALP SERPL-CCNC: 133 U/L
ALT SERPL W/O P-5'-P-CCNC: 19 U/L
ANION GAP SERPL CALC-SCNC: 12 MMOL/L
AST SERPL-CCNC: 16 U/L
BASOPHILS # BLD AUTO: 0.01 K/UL
BASOPHILS NFR BLD: 0.2 %
BILIRUB SERPL-MCNC: 0.5 MG/DL
BLD GP AB SCN CELLS X3 SERPL QL: NORMAL
BUN SERPL-MCNC: 4 MG/DL
CALCIUM SERPL-MCNC: 9.1 MG/DL
CHLORIDE SERPL-SCNC: 107 MMOL/L
CO2 SERPL-SCNC: 20 MMOL/L
CREAT SERPL-MCNC: 0.8 MG/DL
DIFFERENTIAL METHOD: ABNORMAL
EOSINOPHIL # BLD AUTO: 0.2 K/UL
EOSINOPHIL NFR BLD: 3.2 %
ERYTHROCYTE [DISTWIDTH] IN BLOOD BY AUTOMATED COUNT: 19.6 %
EST. GFR  (AFRICAN AMERICAN): >60 ML/MIN/1.73 M^2
EST. GFR  (NON AFRICAN AMERICAN): >60 ML/MIN/1.73 M^2
FERRITIN SERPL-MCNC: 167 NG/ML
GLUCOSE SERPL-MCNC: 174 MG/DL
HCT VFR BLD AUTO: 34.4 %
HGB BLD-MCNC: 11 G/DL
LYMPHOCYTES # BLD AUTO: 2.2 K/UL
LYMPHOCYTES NFR BLD: 46.6 %
MCH RBC QN AUTO: 23.7 PG
MCHC RBC AUTO-ENTMCNC: 32 %
MCV RBC AUTO: 74 FL
MONOCYTES # BLD AUTO: 0.4 K/UL
MONOCYTES NFR BLD: 8.8 %
NEUTROPHILS # BLD AUTO: 2 K/UL
NEUTROPHILS NFR BLD: 41.2 %
PLATELET # BLD AUTO: 189 K/UL
PMV BLD AUTO: 8.9 FL
POTASSIUM SERPL-SCNC: 3.1 MMOL/L
PROT SERPL-MCNC: 7.5 G/DL
RBC # BLD AUTO: 4.64 M/UL
RETICS/RBC NFR AUTO: 4.9 %
SODIUM SERPL-SCNC: 139 MMOL/L
WBC # BLD AUTO: 4.76 K/UL

## 2017-05-22 PROCEDURE — 99214 OFFICE O/P EST MOD 30 MIN: CPT | Mod: S$PBB,,, | Performed by: INTERNAL MEDICINE

## 2017-05-22 PROCEDURE — 99999 PR PBB SHADOW E&M-EST. PATIENT-LVL III: CPT | Mod: PBBFAC,,, | Performed by: INTERNAL MEDICINE

## 2017-05-22 PROCEDURE — 99213 OFFICE O/P EST LOW 20 MIN: CPT | Mod: PBBFAC,25 | Performed by: INTERNAL MEDICINE

## 2017-05-22 RX ORDER — GABAPENTIN 300 MG/1
600 CAPSULE ORAL 2 TIMES DAILY
Qty: 60 CAPSULE | Refills: 3 | OUTPATIENT
Start: 2017-05-22

## 2017-05-22 RX ORDER — OXYCODONE AND ACETAMINOPHEN 10; 325 MG/1; MG/1
1 TABLET ORAL EVERY 4 HOURS PRN
Qty: 120 TABLET | Refills: 0 | OUTPATIENT
Start: 2017-05-22

## 2017-05-22 RX ORDER — GABAPENTIN 300 MG/1
300 CAPSULE ORAL 2 TIMES DAILY
Qty: 60 CAPSULE | Refills: 5 | Status: SHIPPED | OUTPATIENT
Start: 2017-05-22 | End: 2017-07-18 | Stop reason: SDUPTHER

## 2017-05-22 RX ORDER — CARBAMAZEPINE 200 MG/1
400 TABLET ORAL 2 TIMES DAILY
Qty: 60 TABLET | Refills: 0 | Status: SHIPPED | OUTPATIENT
Start: 2017-05-22 | End: 2017-06-23 | Stop reason: SDUPTHER

## 2017-05-22 RX ORDER — BACLOFEN 10 MG/1
10 TABLET ORAL 2 TIMES DAILY
Qty: 60 TABLET | Refills: 2 | Status: SHIPPED | OUTPATIENT
Start: 2017-05-22 | End: 2017-07-18 | Stop reason: SDUPTHER

## 2017-05-22 RX ORDER — OXYCODONE AND ACETAMINOPHEN 10; 325 MG/1; MG/1
1 TABLET ORAL EVERY 4 HOURS PRN
Qty: 120 TABLET | Refills: 0 | Status: SHIPPED | OUTPATIENT
Start: 2017-05-22 | End: 2017-06-23 | Stop reason: SDUPTHER

## 2017-05-22 RX ORDER — OXYCODONE AND ACETAMINOPHEN 10; 325 MG/1; MG/1
1 TABLET ORAL EVERY 4 HOURS PRN
COMMUNITY
End: 2017-05-22 | Stop reason: SDUPTHER

## 2017-05-22 RX ORDER — ERGOCALCIFEROL 1.25 MG/1
50000 CAPSULE ORAL
Qty: 12 CAPSULE | Refills: 3 | Status: SHIPPED | OUTPATIENT
Start: 2017-05-22 | End: 2018-05-22

## 2017-05-22 RX ORDER — BACLOFEN 10 MG/1
10 TABLET ORAL 2 TIMES DAILY
Qty: 60 TABLET | Refills: 3 | Status: CANCELLED | OUTPATIENT
Start: 2017-05-22

## 2017-05-22 RX ORDER — HYDROXYUREA 500 MG/1
500 CAPSULE ORAL DAILY
Qty: 60 CAPSULE | Refills: 2 | Status: SHIPPED | OUTPATIENT
Start: 2017-05-22 | End: 2017-06-23 | Stop reason: SDUPTHER

## 2017-05-22 RX ORDER — CARBAMAZEPINE 200 MG/1
400 TABLET ORAL 2 TIMES DAILY
Qty: 120 TABLET | Refills: 0 | OUTPATIENT
Start: 2017-05-22

## 2017-05-22 NOTE — PROGRESS NOTES
-vaccines, eye doctor  -refills  -start hydrea  -labs in month  -rtc in 3 months   -refer to [facility]

## 2017-05-22 NOTE — PROGRESS NOTES
SECTION OF HEMATOLOGY AND BONE MARROW TRANSPLANT  Return  Patient Visit   2017  Referred by:  No ref. 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  She has moved around country ([place], [place], [place], now back in [place] permanently) and had hematologists taking care of her in each of these respective cities.  Her disease is notable for 1-3 VOC a year requiring ED presentation and admission.  She has episode of acute chest syndrome as young girl.  States she had subclinical stroke with no residual deficits.  Has never been on hydrea. Has history of HTN. Has 2 children.  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     Has only required rare transfusion over the course of her life.    Since our last appt required admission from - for PNA.  Since d/c slowly improving.  Comes to clinic with her daughter.   PAST MEDICAL HISTORY:   Past Medical History:   Diagnosis Date    Hypertension     Sickle cell-beta thalassemia disease with pain     Trigeminal neuralgia        PAST SURGICAL HISTORY:   Past Surgical History:   Procedure Laterality Date     SECTION      TUBAL LIGATION         PAST SOCIAL HISTORY:   reports that she has never smoked. She does not have any smokeless tobacco history on file.    FAMILY HISTORY:  No family history on file.    CURRENT MEDICATIONS:   Current Outpatient Prescriptions   Medication Sig    amlodipine (NORVASC) 10 MG tablet Take 1 tablet (10 mg total) by mouth once daily.    baclofen (LIORESAL) 10 MG tablet Take 1 tablet (10 mg total) by mouth 2 (two) times daily.    budesonide-formoterol 160-4.5 mcg (SYMBICORT) 160-4.5 mcg/actuation HFAA Inhale 2 puffs into the lungs every 12 (twelve) hours. Controller    carbamazepine (TEGRETOL) 200 mg tablet Take 2 tablets  (400 mg total) by mouth 2 (two) times daily.    carvedilol (COREG) 25 MG tablet Take 1 tablet (25 mg total) by mouth 2 (two) times daily.    fluoxetine (PROZAC) 40 MG capsule Take 40 mg by mouth once daily.    gabapentin (NEURONTIN) 300 MG capsule Take 1 capsule (300 mg total) by mouth 2 (two) times daily.    hydrALAZINE (APRESOLINE) 100 MG tablet Take 1 tablet (100 mg total) by mouth every 8 (eight) hours.    ondansetron (ZOFRAN-ODT) 8 MG TbDL Take 1 tablet (8 mg total) by mouth every 6 (six) hours as needed.    oxycodone-acetaminophen (PERCOCET)  mg per tablet Take 1 tablet by mouth every 4 (four) hours as needed for Pain.    senna-docusate 8.6-50 mg (PERICOLACE) 8.6-50 mg per tablet Take 1 tablet by mouth 2 (two) times daily.    ergocalciferol (VITAMIN D2) 50,000 unit Cap Take 1 capsule (50,000 Units total) by mouth every 7 days.    hydroxyurea (HYDREA) 500 mg Cap Take 1 capsule (500 mg total) by mouth once daily.     No current facility-administered medications for this visit.      ALLERGIES:   Review of patient's allergies indicates:  No Known Allergies          REVIEW OF SYSTEMS:   General ROS: negative  Psychological ROS: negative  Ophthalmic ROS: negative  ENT ROS: negative  Allergy and Immunology ROS: negative  Hematological and Lymphatic ROS: negative  Endocrine ROS: negative  Respiratory ROS: negative  Cardiovascular ROS: negative  Gastrointestinal ROS: negative  Genito-Urinary ROS: negative  Musculoskeletal ROS: see HPI  Neurological ROS: negative  Dermatological ROS: negative    PHYSICAL EXAM:   Vitals:    05/22/17 1552   BP: (!) 146/99   Pulse: 99   Temp: 98.8 °F (37.1 °C)       General - well developed, well nourished, no apparent distress  Head & Face - no sinus tenderness  Eyes - normal conjunctivae and lids   ENT - normal external auditory canals and tympanic membranes bilaterally oropharynx clear,  Normal dentition and gums  Neck - normal thyroid  Chest and Lung - normal respiratory  effort, clear to auscultation bilaterally   Cardiovascular - RRR with no MGR, normal S1 and S2; no pedal edema  Abdomen -  soft, nontender, no palpable hepatomegaly or splenomegaly  Lymph - no palpable lymphadenopathy  Extremities - unremarkable nails and digits  Heme - no bruising, petechiae, pallor  Skin - no rashes or lesions  Psych - appropriate mood and affect      ECOG Performance Status: (foot note - ECOG PS provided by Eastern Cooperative Oncology Group) 1 - Symptomatic but completely ambulatory    Karnofsky Performance Score:  90%- Able to Carry on Normal Activity: Minor Symptoms of Disease  DATA:   Lab Results   Component Value Date    WBC 4.76 05/22/2017    HGB 11.0 (L) 05/22/2017    HCT 34.4 (L) 05/22/2017    MCV 74 (L) 05/22/2017     05/22/2017     Gran #   Date Value Ref Range Status   05/22/2017 2.0 1.8 - 7.7 K/uL Final     Gran%   Date Value Ref Range Status   05/22/2017 41.2 38.0 - 73.0 % Final     Lymph #   Date Value Ref Range Status   05/22/2017 2.2 1.0 - 4.8 K/uL Final     Lymph%   Date Value Ref Range Status   05/22/2017 46.6 18.0 - 48.0 % Final     CMP  Sodium   Date Value Ref Range Status   05/22/2017 139 136 - 145 mmol/L Final     Potassium   Date Value Ref Range Status   05/22/2017 3.1 (L) 3.5 - 5.1 mmol/L Final     Chloride   Date Value Ref Range Status   05/22/2017 107 95 - 110 mmol/L Final     CO2   Date Value Ref Range Status   05/22/2017 20 (L) 23 - 29 mmol/L Final     Glucose   Date Value Ref Range Status   05/22/2017 174 (H) 70 - 110 mg/dL Final     BUN, Bld   Date Value Ref Range Status   05/22/2017 4 (L) 6 - 20 mg/dL Final     Creatinine   Date Value Ref Range Status   05/22/2017 0.8 0.5 - 1.4 mg/dL Final     Calcium   Date Value Ref Range Status   05/22/2017 9.1 8.7 - 10.5 mg/dL Final     Total Protein   Date Value Ref Range Status   05/22/2017 7.5 6.0 - 8.4 g/dL Final     Albumin   Date Value Ref Range Status   05/22/2017 3.7 3.5 - 5.2 g/dL Final     Total Bilirubin   Date  Value Ref Range Status   05/22/2017 0.5 0.1 - 1.0 mg/dL Final     Comment:     For infants and newborns, interpretation of results should be based  on gestational age, weight and in agreement with clinical  observations.  Premature Infant recommended reference ranges:  Up to 24 hours.............<8.0 mg/dL  Up to 48 hours............<12.0 mg/dL  3-5 days..................<15.0 mg/dL  6-29 days.................<15.0 mg/dL       Alkaline Phosphatase   Date Value Ref Range Status   05/22/2017 133 55 - 135 U/L Final     AST   Date Value Ref Range Status   05/22/2017 16 10 - 40 U/L Final     ALT   Date Value Ref Range Status   05/22/2017 19 10 - 44 U/L Final     Anion Gap   Date Value Ref Range Status   05/22/2017 12 8 - 16 mmol/L Final     eGFR if    Date Value Ref Range Status   05/22/2017 >60.0 >60 mL/min/1.73 m^2 Final     eGFR if non    Date Value Ref Range Status   05/22/2017 >60.0 >60 mL/min/1.73 m^2 Final     Comment:     Calculation used to obtain the estimated glomerular filtration  rate (eGFR) is the CKD-EPI equation. Since race is unknown   in our information system, the eGFR values for   -American and Non--American patients are given   for each creatinine result.           ASSESSMENT AND PLAN:   Encounter Diagnoses   Name Primary?    Sickle cell beta thalassemia Yes    Iron deficiency anemia, unspecified iron deficiency anemia type      Sickle Cell Disease Monitoring   1)Hydrea  -previously 1-3 crises a year; per patient with increasing severity and frequency over last 2-3 years   -initiate hydrea 500mg BID (5/22/17); reviewed side effects     2)Iron Overload  -she is actually iron deficient, likely contributing to fatigue/anemia  -received  IV feraheme x 2 march 2017 with return of her hgb back to baseline and normalized ferritin   -discuss  gyne and GI referral at next appt   3)AVN  -has chronic bilateral hip pain   -will discuss obtaining MR if pain  returns  4)LE Ulcerations   NA  5)HTN  -norvasc 10; BP elevated  Today   -may add ace-I at next appt if still elevated  6)Opthalmic  -needs opthalmology referral; re refer today   7)Pain  -continue home percocet 10 q 6 hrs   8)CardioPulmonary  -states  Had normal TTE by hematologist in dec 2016  -have requested report; next due dec 2018  9)Renal  -baseline UA and urine pr:Cr with mild proteinura feb 2017; discuss adding ace at next appt   10)Neuro/CVA  -gives anecdotal history of subclinical stroke with no deficits  -will monitor  11)Vaccines   -order and document HIB series, menactra, prevnar followed by pneumovax in 8 weeks     12)Contraception  -she has had tubal ligation   Follow Up: -please refer to ophthalmology  -please schedule vaccines: HIB series, menactra, prevnar followed by pneumovax in 8 weeks   -cbc, cmp lab only in 4 weeks  -cbc, cmp, retic, ferritin, type and screen and MD appt in 8 weeks     Gregory Montgomery MD  Hematology/Oncology/Bone Marrow Transplant

## 2017-05-22 NOTE — Clinical Note
-please refer to ophthalmology -please schedule vaccines: HIB series, menactra, prevnar followed by pneumovax in 8 weeks  -cbc, cmp lab only in 4 weeks -cbc, cmp, retic, ferritin, type and screen and MD appt in 8 weeks

## 2017-05-24 DIAGNOSIS — D57.40 SICKLE CELL BETA THALASSEMIA: Primary | ICD-10-CM

## 2017-06-23 ENCOUNTER — CLINICAL SUPPORT (OUTPATIENT)
Dept: INFECTIOUS DISEASES | Facility: CLINIC | Age: 37
End: 2017-06-23
Payer: MEDICAID

## 2017-06-23 DIAGNOSIS — Z23 NEED FOR VACCINATION: Primary | ICD-10-CM

## 2017-06-23 DIAGNOSIS — D57.40 SICKLE CELL BETA THALASSEMIA: ICD-10-CM

## 2017-06-23 PROCEDURE — 99211 OFF/OP EST MAY X REQ PHY/QHP: CPT | Mod: PBBFAC,25

## 2017-06-23 PROCEDURE — 90472 IMMUNIZATION ADMIN EACH ADD: CPT | Mod: PBBFAC

## 2017-06-23 PROCEDURE — 99999 PR PBB SHADOW E&M-EST. PATIENT-LVL I: CPT | Mod: PBBFAC,,,

## 2017-06-23 PROCEDURE — 90734 MENACWYD/MENACWYCRM VACC IM: CPT | Mod: PBBFAC

## 2017-06-23 PROCEDURE — 90648 HIB PRP-T VACCINE 4 DOSE IM: CPT | Mod: PBBFAC

## 2017-06-23 RX ORDER — CARBAMAZEPINE 200 MG/1
400 TABLET ORAL 2 TIMES DAILY
Qty: 60 TABLET | Refills: 0 | Status: SHIPPED | OUTPATIENT
Start: 2017-06-23 | End: 2017-07-18 | Stop reason: SDUPTHER

## 2017-06-23 RX ORDER — HYDROXYUREA 500 MG/1
500 CAPSULE ORAL DAILY
Qty: 60 CAPSULE | Refills: 2 | Status: SHIPPED | OUTPATIENT
Start: 2017-06-23 | End: 2018-06-23

## 2017-06-23 RX ORDER — OXYCODONE AND ACETAMINOPHEN 10; 325 MG/1; MG/1
1 TABLET ORAL EVERY 4 HOURS PRN
Qty: 120 TABLET | Refills: 0 | Status: SHIPPED | OUTPATIENT
Start: 2017-06-23 | End: 2017-07-24 | Stop reason: SDUPTHER

## 2017-06-23 NOTE — TELEPHONE ENCOUNTER
----- Message from Florence Allen sent at 6/23/2017 10:29 AM CDT -----  Contact: self  Nazanin is stating that she is in a lot of pain and needs a refill on medication RX, oxycodone-acetaminophen (PERCOCET)  mg per tablet.  Pt also needs carbamazepine (TEGRETOL) 200 mg tablet.  Pt is also stating that she has been taking the hydroxyurea (HYDREA) 500 mg Cap and has been having a bad break out from this medication since the first week of being on medication.  Nazanin is stating that she has been getting rashes from medication and it makes her feel more tired and feeling much worse.  Pt would like to speak with a nurse about the hydroxyurea (HYDREA) 500 mg Cap medication, pt wants to know what should she do or what else can she take.  Pt needs refills on medications RX,oxycodone-acetaminophen (PERCOCET)  mg per tablet and RX, carbamazepine (TEGRETOL) 200 mg tablet.  Pt wants this medication sent over to [facility]  [facility] [address].    Please by the patient's request please send over medications today to [facility], thanks    Please contact Nazanin as soon as possible at [phone]    Thank you

## 2017-06-26 ENCOUNTER — TELEPHONE (OUTPATIENT)
Dept: HEMATOLOGY/ONCOLOGY | Facility: CLINIC | Age: 37
End: 2017-06-26

## 2017-06-26 NOTE — TELEPHONE ENCOUNTER
Spoke with patient. She said that she has been running a fever of 100.4 off and on since Friday when she got a pneumonia shot. She also states that her arm is swollen where the shot was given.   Dr Montgomery said for her to come to the office tomorrow morning. Appointment for 8am was given to pt. She confirmed the time.

## 2017-06-26 NOTE — TELEPHONE ENCOUNTER
----- Message from Jade Sheffield sent at 6/26/2017 12:19 PM CDT -----  Contact: Self  Patient keeps spiking temperature and her injection site is causing discomfort. She would like to be seen today or tomorrow.    [phone]

## 2017-06-27 ENCOUNTER — OFFICE VISIT (OUTPATIENT)
Dept: HEMATOLOGY/ONCOLOGY | Facility: CLINIC | Age: 37
End: 2017-06-27
Payer: MEDICAID

## 2017-06-27 VITALS
HEIGHT: 62 IN | BODY MASS INDEX: 53.92 KG/M2 | SYSTOLIC BLOOD PRESSURE: 151 MMHG | DIASTOLIC BLOOD PRESSURE: 99 MMHG | TEMPERATURE: 99 F | HEART RATE: 90 BPM | WEIGHT: 293 LBS

## 2017-06-27 DIAGNOSIS — L27.0 DRUG RASH: Primary | ICD-10-CM

## 2017-06-27 DIAGNOSIS — J45.909 UNCOMPLICATED ASTHMA, UNSPECIFIED ASTHMA SEVERITY: ICD-10-CM

## 2017-06-27 PROCEDURE — 99213 OFFICE O/P EST LOW 20 MIN: CPT | Mod: PBBFAC | Performed by: INTERNAL MEDICINE

## 2017-06-27 PROCEDURE — 99999 PR PBB SHADOW E&M-EST. PATIENT-LVL III: CPT | Mod: PBBFAC,,, | Performed by: INTERNAL MEDICINE

## 2017-06-27 PROCEDURE — 99213 OFFICE O/P EST LOW 20 MIN: CPT | Mod: S$PBB,,, | Performed by: INTERNAL MEDICINE

## 2017-06-27 RX ORDER — HYDROCORTISONE 1 %
CREAM (GRAM) TOPICAL
Qty: 30 G | Refills: 1 | Status: ON HOLD | OUTPATIENT
Start: 2017-06-27 | End: 2017-10-02

## 2017-06-27 NOTE — PROGRESS NOTES
"Patient requested urgent walk in appt for pain to left arm/deltoid following immunizations last week.  VSS.   Exam shows are of erythema on left deltoid c/w with mild injection site reaction.  Also notes "low grade" fever at home <100.4, malaise.  Discussed findings related to immune reaction from vaccines and that symptoms will resolve with supportive care; recommended ice packs to area and tylenol prn.   Also complaining of wheezing. Has history of asthma and no pulmonologist. Was given Symbicort by ER and using sparingly.  Will refer to pulm for asthma mgmt.    Also notes rash on face with hydrea; on exam faint acneiform rash on cheeks. Recommend hydrocort 1% cream BID.  She should keep previously scheduled July 2017 with me.      Gregory Montgomery MD  Hematology & Stem Cell Transplant     "

## 2017-07-06 ENCOUNTER — TELEPHONE (OUTPATIENT)
Dept: HEMATOLOGY/ONCOLOGY | Facility: CLINIC | Age: 37
End: 2017-07-06

## 2017-07-06 NOTE — TELEPHONE ENCOUNTER
Returned call to patient. She was asking for a work excuse note for the 4th and 5th of July, but she was not seen in the office. I offered to give her a note for the date of her last visit 6/27/17. She said that she will come by to pick it up.

## 2017-07-06 NOTE — TELEPHONE ENCOUNTER
----- Message from Jade Sheffield sent at 7/6/2017 12:54 PM CDT -----  Contact: Self  Patient needs excuse for missing work.    Please call;  [phone]

## 2017-07-18 ENCOUNTER — OFFICE VISIT (OUTPATIENT)
Dept: HEMATOLOGY/ONCOLOGY | Facility: CLINIC | Age: 37
End: 2017-07-18
Payer: MEDICAID

## 2017-07-18 ENCOUNTER — LAB VISIT (OUTPATIENT)
Dept: LAB | Facility: HOSPITAL | Age: 37
End: 2017-07-18
Attending: INTERNAL MEDICINE
Payer: MEDICAID

## 2017-07-18 VITALS
HEART RATE: 101 BPM | RESPIRATION RATE: 20 BRPM | WEIGHT: 293 LBS | OXYGEN SATURATION: 98 % | HEIGHT: 63 IN | BODY MASS INDEX: 51.91 KG/M2 | SYSTOLIC BLOOD PRESSURE: 150 MMHG | TEMPERATURE: 99 F | DIASTOLIC BLOOD PRESSURE: 92 MMHG

## 2017-07-18 DIAGNOSIS — D57.1 HB-SS DISEASE WITHOUT CRISIS: ICD-10-CM

## 2017-07-18 DIAGNOSIS — D57.40 SICKLE CELL BETA THALASSEMIA: ICD-10-CM

## 2017-07-18 DIAGNOSIS — D57.40 SICKLE CELL BETA THALASSEMIA: Primary | ICD-10-CM

## 2017-07-18 DIAGNOSIS — Z87.09 HISTORY OF ASTHMA: ICD-10-CM

## 2017-07-18 DIAGNOSIS — Z00.00 ENCOUNTER FOR MEDICAL EXAMINATION TO ESTABLISH CARE: ICD-10-CM

## 2017-07-18 DIAGNOSIS — N92.4 EXCESSIVE BLEEDING IN PREMENOPAUSAL PERIOD: ICD-10-CM

## 2017-07-18 LAB
ABO + RH BLD: NORMAL
ALBUMIN SERPL BCP-MCNC: 3.7 G/DL
ALP SERPL-CCNC: 107 U/L
ALT SERPL W/O P-5'-P-CCNC: 9 U/L
ANION GAP SERPL CALC-SCNC: 7 MMOL/L
AST SERPL-CCNC: 13 U/L
BASOPHILS # BLD AUTO: 0.01 K/UL
BASOPHILS NFR BLD: 0.2 %
BILIRUB SERPL-MCNC: 0.8 MG/DL
BLD GP AB SCN CELLS X3 SERPL QL: NORMAL
BUN SERPL-MCNC: 7 MG/DL
CALCIUM SERPL-MCNC: 9.5 MG/DL
CHLORIDE SERPL-SCNC: 109 MMOL/L
CO2 SERPL-SCNC: 26 MMOL/L
CREAT SERPL-MCNC: 0.8 MG/DL
DIFFERENTIAL METHOD: ABNORMAL
EOSINOPHIL # BLD AUTO: 0.3 K/UL
EOSINOPHIL NFR BLD: 4.4 %
ERYTHROCYTE [DISTWIDTH] IN BLOOD BY AUTOMATED COUNT: 16.4 %
EST. GFR  (AFRICAN AMERICAN): >60 ML/MIN/1.73 M^2
EST. GFR  (NON AFRICAN AMERICAN): >60 ML/MIN/1.73 M^2
FERRITIN SERPL-MCNC: 71 NG/ML
GLUCOSE SERPL-MCNC: 110 MG/DL
HCT VFR BLD AUTO: 34.8 %
HGB BLD-MCNC: 11.8 G/DL
LYMPHOCYTES # BLD AUTO: 3 K/UL
LYMPHOCYTES NFR BLD: 46.5 %
MCH RBC QN AUTO: 25.6 PG
MCHC RBC AUTO-ENTMCNC: 33.9 %
MCV RBC AUTO: 76 FL
MONOCYTES # BLD AUTO: 0.8 K/UL
MONOCYTES NFR BLD: 11.9 %
NEUTROPHILS # BLD AUTO: 2.4 K/UL
NEUTROPHILS NFR BLD: 36.8 %
PLATELET # BLD AUTO: 247 K/UL
PMV BLD AUTO: 9.2 FL
POTASSIUM SERPL-SCNC: 3.2 MMOL/L
PROT SERPL-MCNC: 7.5 G/DL
RBC # BLD AUTO: 4.61 M/UL
RETICS/RBC NFR AUTO: 4 %
SODIUM SERPL-SCNC: 142 MMOL/L
WBC # BLD AUTO: 6.41 K/UL

## 2017-07-18 PROCEDURE — 99214 OFFICE O/P EST MOD 30 MIN: CPT | Mod: S$PBB,,, | Performed by: INTERNAL MEDICINE

## 2017-07-18 PROCEDURE — 99999 PR PBB SHADOW E&M-EST. PATIENT-LVL IV: CPT | Mod: PBBFAC,,, | Performed by: INTERNAL MEDICINE

## 2017-07-18 PROCEDURE — 80053 COMPREHEN METABOLIC PANEL: CPT

## 2017-07-18 PROCEDURE — 86900 BLOOD TYPING SEROLOGIC ABO: CPT

## 2017-07-18 PROCEDURE — 36415 COLL VENOUS BLD VENIPUNCTURE: CPT

## 2017-07-18 PROCEDURE — 86901 BLOOD TYPING SEROLOGIC RH(D): CPT

## 2017-07-18 PROCEDURE — 85045 AUTOMATED RETICULOCYTE COUNT: CPT

## 2017-07-18 PROCEDURE — 82728 ASSAY OF FERRITIN: CPT

## 2017-07-18 PROCEDURE — 85025 COMPLETE CBC W/AUTO DIFF WBC: CPT

## 2017-07-18 RX ORDER — GABAPENTIN 300 MG/1
300 CAPSULE ORAL 2 TIMES DAILY
Qty: 60 CAPSULE | Refills: 5 | Status: SHIPPED | OUTPATIENT
Start: 2017-07-18 | End: 2018-03-12 | Stop reason: SDUPTHER

## 2017-07-18 RX ORDER — BACLOFEN 10 MG/1
10 TABLET ORAL 2 TIMES DAILY
Qty: 60 TABLET | Refills: 2 | Status: SHIPPED | OUTPATIENT
Start: 2017-07-18 | End: 2018-03-12 | Stop reason: SDUPTHER

## 2017-07-18 RX ORDER — CARBAMAZEPINE 200 MG/1
400 TABLET ORAL 2 TIMES DAILY
Qty: 60 TABLET | Refills: 0 | Status: SHIPPED | OUTPATIENT
Start: 2017-07-18 | End: 2018-03-12

## 2017-07-18 NOTE — Clinical Note
-schedule with  ophthalmology -schedule with internal medicine -cbc, cmp, retic, ferritin, type and screen and MD appt in 3 months

## 2017-07-18 NOTE — PROGRESS NOTES
SECTION OF HEMATOLOGY AND BONE MARROW TRANSPLANT  Return  Patient Visit   2017  Referred by:  No ref. 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  She established care with me in 2017.    She has moved around country ([place], [place], [place], now back in [place] permanently) and had hematologists taking care of her in each of these respective cities.  Her disease is notable for 1-3 VOC a year requiring ED presentation and admission.  She has episode of acute chest syndrome as young girl.  States she had subclinical stroke with no residual deficits.  Has never been on hydrea. Has history of HTN. Has 2 children.  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     Has only required rare transfusion over the course of her life.    Since our last appt, injection site pain following immunizations has resolved.  She stopped hydrea approximately 2 weeks ago due to facial rash and she states it had no affect on pain crises.  Refusing retrial.  Otherwise asymptomatic.  Comes to clinic with her daughter.       PAST MEDICAL HISTORY:   Past Medical History:   Diagnosis Date    Hypertension     Sickle cell-beta thalassemia disease with pain     Trigeminal neuralgia        PAST SURGICAL HISTORY:   Past Surgical History:   Procedure Laterality Date     SECTION      TUBAL LIGATION         PAST SOCIAL HISTORY:   reports that she has never smoked. She does not have any smokeless tobacco history on file.    FAMILY HISTORY:  No family history on file.    CURRENT MEDICATIONS:   Current Outpatient Prescriptions   Medication Sig    amlodipine (NORVASC) 10 MG tablet Take 1 tablet (10 mg total) by mouth once daily.    baclofen (LIORESAL) 10 MG tablet Take 1 tablet (10 mg total) by mouth 2 (two)  times daily.    budesonide-formoterol 160-4.5 mcg (SYMBICORT) 160-4.5 mcg/actuation HFAA Inhale 2 puffs into the lungs every 12 (twelve) hours. Controller    carbamazepine (TEGRETOL) 200 mg tablet Take 2 tablets (400 mg total) by mouth 2 (two) times daily.    carvedilol (COREG) 25 MG tablet Take 1 tablet (25 mg total) by mouth 2 (two) times daily.    ergocalciferol (VITAMIN D2) 50,000 unit Cap Take 1 capsule (50,000 Units total) by mouth every 7 days.    fluoxetine (PROZAC) 40 MG capsule Take 40 mg by mouth once daily.    gabapentin (NEURONTIN) 300 MG capsule Take 1 capsule (300 mg total) by mouth 2 (two) times daily.    hydrALAZINE (APRESOLINE) 100 MG tablet Take 1 tablet (100 mg total) by mouth every 8 (eight) hours.    hydrocortisone 1 % cream Apply to affected area 2 times daily    hydroxyurea (HYDREA) 500 mg Cap Take 1 capsule (500 mg total) by mouth once daily.    ondansetron (ZOFRAN-ODT) 8 MG TbDL Take 1 tablet (8 mg total) by mouth every 6 (six) hours as needed.    oxycodone-acetaminophen (PERCOCET)  mg per tablet Take 1 tablet by mouth every 4 (four) hours as needed for Pain.    senna-docusate 8.6-50 mg (PERICOLACE) 8.6-50 mg per tablet Take 1 tablet by mouth 2 (two) times daily.     No current facility-administered medications for this visit.      ALLERGIES:   Review of patient's allergies indicates:  No Known Allergies          REVIEW OF SYSTEMS:   General ROS: negative  Psychological ROS: negative  Ophthalmic ROS: negative  ENT ROS: negative  Allergy and Immunology ROS: negative  Hematological and Lymphatic ROS: negative  Endocrine ROS: negative  Respiratory ROS: negative  Cardiovascular ROS: negative  Gastrointestinal ROS: negative  Genito-Urinary ROS: negative  Musculoskeletal ROS: see HPI  Neurological ROS: negative  Dermatological ROS: negative    PHYSICAL EXAM:   Vitals:    07/18/17 1347   BP: (!) 150/92   Pulse: 101   Resp: 20   Temp: 98.6 °F (37 °C)       General - well  developed, well nourished, no apparent distress  Head & Face - no sinus tenderness  Eyes - normal conjunctivae and lids   ENT - normal external auditory canals and tympanic membranes bilaterally oropharynx clear,  Normal dentition and gums  Neck - normal thyroid  Chest and Lung - normal respiratory effort, clear to auscultation bilaterally   Cardiovascular - RRR with no MGR, normal S1 and S2; no pedal edema  Abdomen -  soft, nontender, no palpable hepatomegaly or splenomegaly  Lymph - no palpable lymphadenopathy  Extremities - unremarkable nails and digits  Heme - no bruising, petechiae, pallor  Skin - no rashes or lesions  Psych - appropriate mood and affect      ECOG Performance Status: (foot note - ECOG PS provided by Eastern Cooperative Oncology Group) 1 - Symptomatic but completely ambulatory    Karnofsky Performance Score:  90%- Able to Carry on Normal Activity: Minor Symptoms of Disease  DATA:   Lab Results   Component Value Date    WBC 6.41 07/18/2017    HGB 11.8 (L) 07/18/2017    HCT 34.8 (L) 07/18/2017    MCV 76 (L) 07/18/2017     07/18/2017     Gran #   Date Value Ref Range Status   07/18/2017 2.4 1.8 - 7.7 K/uL Final     Gran%   Date Value Ref Range Status   07/18/2017 36.8 (L) 38.0 - 73.0 % Final     Lymph #   Date Value Ref Range Status   07/18/2017 3.0 1.0 - 4.8 K/uL Final     Lymph%   Date Value Ref Range Status   07/18/2017 46.5 18.0 - 48.0 % Final     CMP  Sodium   Date Value Ref Range Status   07/18/2017 142 136 - 145 mmol/L Final     Potassium   Date Value Ref Range Status   07/18/2017 3.2 (L) 3.5 - 5.1 mmol/L Final     Chloride   Date Value Ref Range Status   07/18/2017 109 95 - 110 mmol/L Final     CO2   Date Value Ref Range Status   07/18/2017 26 23 - 29 mmol/L Final     Glucose   Date Value Ref Range Status   07/18/2017 110 70 - 110 mg/dL Final     BUN, Bld   Date Value Ref Range Status   07/18/2017 7 6 - 20 mg/dL Final     Creatinine   Date Value Ref Range Status   07/18/2017 0.8 0.5  - 1.4 mg/dL Final     Calcium   Date Value Ref Range Status   07/18/2017 9.5 8.7 - 10.5 mg/dL Final     Total Protein   Date Value Ref Range Status   07/18/2017 7.5 6.0 - 8.4 g/dL Final     Albumin   Date Value Ref Range Status   07/18/2017 3.7 3.5 - 5.2 g/dL Final     Total Bilirubin   Date Value Ref Range Status   07/18/2017 0.8 0.1 - 1.0 mg/dL Final     Comment:     For infants and newborns, interpretation of results should be based  on gestational age, weight and in agreement with clinical  observations.  Premature Infant recommended reference ranges:  Up to 24 hours.............<8.0 mg/dL  Up to 48 hours............<12.0 mg/dL  3-5 days..................<15.0 mg/dL  6-29 days.................<15.0 mg/dL       Alkaline Phosphatase   Date Value Ref Range Status   07/18/2017 107 55 - 135 U/L Final     AST   Date Value Ref Range Status   07/18/2017 13 10 - 40 U/L Final     ALT   Date Value Ref Range Status   07/18/2017 9 (L) 10 - 44 U/L Final     Anion Gap   Date Value Ref Range Status   07/18/2017 7 (L) 8 - 16 mmol/L Final     eGFR if    Date Value Ref Range Status   07/18/2017 >60.0 >60 mL/min/1.73 m^2 Final     eGFR if non    Date Value Ref Range Status   07/18/2017 >60.0 >60 mL/min/1.73 m^2 Final     Comment:     Calculation used to obtain the estimated glomerular filtration  rate (eGFR) is the CKD-EPI equation. Since race is unknown   in our information system, the eGFR values for   -American and Non--American patients are given   for each creatinine result.       Quant 2.2 - 3.2 % 5.9    Hemoglobin Bands   Hb A , Hb S , Hb F , Hb A2   Hemoglobin Electrophoresis Interp   See comment   Comments: There are prominent bands in the A and S positions,   measuring approximately 20 % and 66 % respectively with a hemoglobin   F band of approximately 8% and an elevated hemoglobin A2.     This pattern suggests sickle cell/beta + thalassemia, provided that   there is no  history of recent RBC transfusion.  Await acid   electrophoresis   for confirmation of hemoglobin S.   Interpreted by Violette Sutherland M.D.          ASSESSMENT AND PLAN:   Encounter Diagnosis   Name Primary?    Sickle cell beta thalassemia Yes     Sickle Cell Disease Monitoring   1)Hydrea  -previously 1-3 crises a year; per patient with increasing severity and frequency over last 2-3 years   -initiate hydrea 500mg BID (5/22/17); reviewed side effects     2)Iron Overload  -she is actually iron deficient, likely contributing to fatigue/anemia  -received  IV feraheme x 2 march 2017 with return of her hgb back to baseline and normalized ferritin   -refer to gyne per patient request    3)AVN  -has chronic bilateral hip pain   -will discuss obtaining MR if pain returns  4)LE Ulcerations   NA  5)HTN  -continue norvasc 10; BP elevated  Today   -may add ace-I at next appt if still elevated  6)Opthalmic  -needs opthalmology referral; re refer today   7)Pain  -continue home percocet 10 q 6 hrs   8)CardioPulmonary  -states  Had normal TTE by hematologist in dec 2016  -have requested report; next due dec 2018  9)Renal  -baseline UA and urine pr:Cr with mild proteinura feb 2017; discuss adding ace at next appt   10)Neuro/CVA  -gives anecdotal history of subclinical stroke with no deficits  -will monitor  11)Vaccines   -receiving HIB series, menactra, prevnar followed by pneumovax      12)Contraception  -she has had tubal ligation   Follow Up: -refer to ophthalmology  -refer to primary care   -cbc, cmp, retic, ferritin, type and screen and MD appt in 3 months    Gregory Montgomery MD  Hematology/Oncology/Bone Marrow Transplant

## 2017-07-24 DIAGNOSIS — D57.40 SICKLE CELL BETA THALASSEMIA: ICD-10-CM

## 2017-07-24 RX ORDER — OXYCODONE AND ACETAMINOPHEN 10; 325 MG/1; MG/1
1 TABLET ORAL EVERY 4 HOURS PRN
Qty: 120 TABLET | Refills: 0 | Status: SHIPPED | OUTPATIENT
Start: 2017-07-24 | End: 2017-08-28 | Stop reason: SDUPTHER

## 2017-07-24 NOTE — TELEPHONE ENCOUNTER
----- Message from Daciaindra Thomas sent at 7/24/2017 12:57 PM CDT -----  Contact: pt  Pt requests refill for oxycodone-acetaminophen (PERCOCET)  mg per tablet &  fluoxetine (PROZAC) 40 MG capsule. Pt can be reached at [phone] (home)           ..  [facility]/pharmacy #[id number] - [place], [place] - [address]  [address]  [facility] [address]  Phone: [phone] Fax: [phone]

## 2017-07-28 ENCOUNTER — TELEPHONE (OUTPATIENT)
Dept: HEMATOLOGY/ONCOLOGY | Facility: CLINIC | Age: 37
End: 2017-07-28

## 2017-07-28 NOTE — TELEPHONE ENCOUNTER
----- Message from Joelle Nevarez sent at 7/28/2017 12:02 PM CDT -----  Contact: self  Patient states need to speak with nurse regarding prescription Percocet 10/325.  Patient states advised by pharmacy doctor need to call insurance co to get approval for 120 pills   Pt states insurance co approved 28 pills   Pt states need 120mg   Please call pt at [phone]  Spoke with pt and explained that insurance company is requiring a prior authorization for percocet. I am now waiting for the insurance company to fax over required paperwork and will have Dr. Montgomery to fill out and sign papers. Once the paperwork is faxed over back to insurance company it will take at least 24 hours to make a decision, pt verbalized understanding.  Dayna

## 2017-07-28 NOTE — TELEPHONE ENCOUNTER
----- Message from Dacia Thomas sent at 7/28/2017  9:13 AM CDT -----  Contact: pt  Pt states she went to  her pain medication ( oxycodone-acetaminophen (PERCOCET)  mg per tablet) from the pharmacy she was told all they would release is 28 , but the prescription was sent in for 120. She states she was advised by the pharmacist to have Dr Montgomery contact her insurance to get an approval for 120 tablets/ please call pt at [phone].     ..  [facility]/pharmacy #[id number] - [place], [place] - [address]  [address]  [place] [address]  Phone: [phone] Fax: [phone]      Spoke with pt and explained that insurance company is requiring a prior authorization for percocet. I am now waiting for the insurance company to fax over required paperwork and will have Dr. Montgomery to fill out and sign papers. Once the paperwork is faxed over back to insurance company it will take at least 24 hours to make a decision, pt verbalized understanding.  Dayna

## 2017-07-31 DIAGNOSIS — I10 ESSENTIAL HYPERTENSION: Primary | ICD-10-CM

## 2017-07-31 RX ORDER — AMLODIPINE BESYLATE 10 MG/1
10 TABLET ORAL DAILY
Qty: 30 TABLET | Refills: 3 | Status: SHIPPED | OUTPATIENT
Start: 2017-07-31 | End: 2018-06-25 | Stop reason: SDUPTHER

## 2017-07-31 NOTE — TELEPHONE ENCOUNTER
----- Message from Julieta Lozoya sent at 7/31/2017  9:21 AM CDT -----  Contact: Pt  Pt is calling to have Dr hooper insurance company to approve medication, oxycodone-acetaminophen (PERCOCET)  mg per tablet and amlodipine (NORVASC) 10 MG tablet.    Pt can be reached at [phone].  Thank you      Spoke with pt, explained that we currently waiting for her insurance company to approve the prior auth for percocet and the refill request for norvasc has been sent to Dr. Montgomery.   Dayna

## 2017-08-28 ENCOUNTER — TELEPHONE (OUTPATIENT)
Dept: HEMATOLOGY/ONCOLOGY | Facility: CLINIC | Age: 37
End: 2017-08-28

## 2017-08-28 DIAGNOSIS — D57.40 SICKLE CELL BETA THALASSEMIA: ICD-10-CM

## 2017-08-28 RX ORDER — OXYCODONE AND ACETAMINOPHEN 10; 325 MG/1; MG/1
1 TABLET ORAL EVERY 4 HOURS PRN
Qty: 120 TABLET | Refills: 0 | Status: SHIPPED | OUTPATIENT
Start: 2017-08-28 | End: 2017-08-31 | Stop reason: SDUPTHER

## 2017-08-28 NOTE — TELEPHONE ENCOUNTER
----- Message from Keon Longoria sent at 8/28/2017  1:57 PM CDT -----  Contact: Pt   Pt is requesting rx refill for oxycodone-acetaminophen (PERCOCET)  mg per tablet    Can be reached at [phone]  Spoke with pt and explained that her refill request has been approved by Dr. Montgomery and sent to [facility]. Pt verbalized understanding.  Dayna

## 2017-08-28 NOTE — TELEPHONE ENCOUNTER
----- Message from Marilyn Avalos sent at 8/28/2017 11:07 AM CDT -----  Contact: self  Pt. needs a refill on oxycodone-acetaminophen (PERCOCET)  mg per tablet called into [facility] ([address]) at [phone].    She stated that she has 4 pills remaining and is afraid she will incur a flare up as her cycle is about to start on tomorrow.    Please note that her f/u with Dr. Montgomery if not until 10/19/17.    Please call pt at [phone] (t) if you have any questions.    Thanks.

## 2017-08-31 DIAGNOSIS — D57.40 SICKLE CELL BETA THALASSEMIA: ICD-10-CM

## 2017-08-31 RX ORDER — OXYCODONE AND ACETAMINOPHEN 10; 325 MG/1; MG/1
1 TABLET ORAL EVERY 4 HOURS PRN
Qty: 120 TABLET | Refills: 0 | Status: SHIPPED | OUTPATIENT
Start: 2017-08-31 | End: 2017-09-28 | Stop reason: SDUPTHER

## 2017-08-31 NOTE — TELEPHONE ENCOUNTER
----- Message from Julieta Lozoya sent at 8/31/2017 11:23 AM CDT -----  Contact: Pt  Pt is calling to speak with nurse in regards to medication refill. Pharmacy sent to does not have medication and would like it resent to [facility].    Pt can be reached at [phone].  Thank you

## 2017-09-18 ENCOUNTER — TELEPHONE (OUTPATIENT)
Dept: HEMATOLOGY/ONCOLOGY | Facility: CLINIC | Age: 37
End: 2017-09-18

## 2017-09-18 ENCOUNTER — INFUSION (OUTPATIENT)
Dept: INFUSION THERAPY | Facility: HOSPITAL | Age: 37
End: 2017-09-18
Attending: INTERNAL MEDICINE
Payer: MEDICAID

## 2017-09-18 DIAGNOSIS — D50.9 IRON DEFICIENCY ANEMIA, UNSPECIFIED IRON DEFICIENCY ANEMIA TYPE: ICD-10-CM

## 2017-09-18 DIAGNOSIS — D57.00 SICKLE-CELL DISEASE WITH PAIN: Primary | ICD-10-CM

## 2017-09-18 PROCEDURE — 25000003 PHARM REV CODE 250: Performed by: INTERNAL MEDICINE

## 2017-09-18 PROCEDURE — 96361 HYDRATE IV INFUSION ADD-ON: CPT

## 2017-09-18 PROCEDURE — 63600175 PHARM REV CODE 636 W HCPCS: Performed by: INTERNAL MEDICINE

## 2017-09-18 PROCEDURE — 96375 TX/PRO/DX INJ NEW DRUG ADDON: CPT

## 2017-09-18 PROCEDURE — 96374 THER/PROPH/DIAG INJ IV PUSH: CPT

## 2017-09-18 RX ORDER — HYDROMORPHONE HYDROCHLORIDE 2 MG/ML
1 INJECTION, SOLUTION INTRAMUSCULAR; INTRAVENOUS; SUBCUTANEOUS ONCE
Status: COMPLETED | OUTPATIENT
Start: 2017-09-18 | End: 2017-09-18

## 2017-09-18 RX ORDER — DIPHENHYDRAMINE HYDROCHLORIDE 50 MG/ML
25 INJECTION INTRAMUSCULAR; INTRAVENOUS ONCE
Status: COMPLETED | OUTPATIENT
Start: 2017-09-18 | End: 2017-09-18

## 2017-09-18 RX ORDER — HYDROMORPHONE HYDROCHLORIDE 2 MG/ML
1 INJECTION, SOLUTION INTRAMUSCULAR; INTRAVENOUS; SUBCUTANEOUS ONCE
Status: CANCELLED
Start: 2017-09-18 | End: 2017-09-18

## 2017-09-18 RX ORDER — DIPHENHYDRAMINE HYDROCHLORIDE 50 MG/ML
25 INJECTION INTRAMUSCULAR; INTRAVENOUS ONCE
Status: CANCELLED
Start: 2017-09-18

## 2017-09-18 RX ADMIN — HYDROMORPHONE HYDROCHLORIDE 1 MG: 2 INJECTION INTRAMUSCULAR; INTRAVENOUS; SUBCUTANEOUS at 02:09

## 2017-09-18 RX ADMIN — DIPHENHYDRAMINE HYDROCHLORIDE 25 MG: 50 INJECTION INTRAMUSCULAR; INTRAVENOUS at 02:09

## 2017-09-18 RX ADMIN — SODIUM CHLORIDE: 0.9 INJECTION, SOLUTION INTRAVENOUS at 02:09

## 2017-09-18 NOTE — TELEPHONE ENCOUNTER
----- Message from Eugene Clark sent at 9/18/2017  8:40 AM CDT -----  Contact: pt   Pt will like to know can she come in today if anything is available?    Contact::[phone]  Spoke with pt at 1255pm on 09/18/17, pt has been added to infusion schedule at 330pm per Dr. Soni's orders, pt verbalized understanding.  Dayna

## 2017-09-18 NOTE — NURSING
Patient arrived to clinic for 1 L ivfs and pain meds. Patient c/o 9/10 pain from right hip extending down to right foot. 1 mg of dilaudid given iv along with 25 mg of benadryl for itching from pain meds per Dr. Soni orders. Patient also c/o cough with wheezing and clear sputum. Patient states she gets nauseated and increased pain from coughing. Patient requesting cough meds/steroid/or antibiotic d/t previous hx of pnemonia. Patient also has hx of asthma. Patient states she has been taking mucinex and albuterol inhaler and mother's nebulizer Q4H with no relief. Patient stated multiple times for concerns of pnemonia. Dr. Soni advised patient to go to ER for further eval and chest xray to r/o pnemonia. Patient is in agreement with plan. 24g piv to left thumb left in place, saline locked. Discharged to ER. Patient stated does not need anyone to escort her to ER as she would like to move her car closer to the ER.

## 2017-09-25 ENCOUNTER — HOSPITAL ENCOUNTER (EMERGENCY)
Facility: HOSPITAL | Age: 37
Discharge: HOME | End: 2017-09-25
Attending: EMERGENCY MEDICINE
Payer: MEDICAID

## 2017-09-25 VITALS
HEART RATE: 86 BPM | SYSTOLIC BLOOD PRESSURE: 171 MMHG | TEMPERATURE: 99 F | DIASTOLIC BLOOD PRESSURE: 99 MMHG | BODY MASS INDEX: 53.92 KG/M2 | RESPIRATION RATE: 18 BRPM | HEIGHT: 62 IN | WEIGHT: 293 LBS | OXYGEN SATURATION: 100 %

## 2017-09-25 DIAGNOSIS — J45.901 ASTHMA EXACERBATION: Primary | ICD-10-CM

## 2017-09-25 DIAGNOSIS — R06.2 WHEEZING: ICD-10-CM

## 2017-09-25 LAB
B-HCG UR QL: NEGATIVE
CTP QC/QA: YES

## 2017-09-25 PROCEDURE — 63600175 PHARM REV CODE 636 W HCPCS

## 2017-09-25 PROCEDURE — 94640 AIRWAY INHALATION TREATMENT: CPT

## 2017-09-25 PROCEDURE — 93005 ELECTROCARDIOGRAM TRACING: CPT

## 2017-09-25 PROCEDURE — 99284 EMERGENCY DEPT VISIT MOD MDM: CPT | Mod: 25

## 2017-09-25 PROCEDURE — 99284 EMERGENCY DEPT VISIT MOD MDM: CPT | Mod: ,,,

## 2017-09-25 PROCEDURE — 25000242 PHARM REV CODE 250 ALT 637 W/ HCPCS

## 2017-09-25 PROCEDURE — 93010 ELECTROCARDIOGRAM REPORT: CPT | Mod: ,,, | Performed by: INTERNAL MEDICINE

## 2017-09-25 PROCEDURE — 81025 URINE PREGNANCY TEST: CPT

## 2017-09-25 RX ORDER — PREDNISONE 20 MG/1
40 TABLET ORAL DAILY
Qty: 10 TABLET | Refills: 0 | Status: ON HOLD | OUTPATIENT
Start: 2017-09-25 | End: 2017-10-01

## 2017-09-25 RX ORDER — METHYLPREDNISOLONE SOD SUCC 125 MG
125 VIAL (EA) INJECTION
Status: DISCONTINUED | OUTPATIENT
Start: 2017-09-25 | End: 2017-09-25

## 2017-09-25 RX ORDER — IPRATROPIUM BROMIDE AND ALBUTEROL SULFATE 2.5; .5 MG/3ML; MG/3ML
3 SOLUTION RESPIRATORY (INHALATION)
Status: COMPLETED | OUTPATIENT
Start: 2017-09-25 | End: 2017-09-25

## 2017-09-25 RX ORDER — PREDNISONE 20 MG/1
40 TABLET ORAL DAILY
Qty: 10 TABLET | Refills: 0 | Status: SHIPPED | OUTPATIENT
Start: 2017-09-25 | End: 2017-09-25

## 2017-09-25 RX ORDER — ALBUTEROL SULFATE 0.63 MG/3ML
0.63 SOLUTION RESPIRATORY (INHALATION) EVERY 6 HOURS PRN
COMMUNITY
End: 2018-05-05

## 2017-09-25 RX ORDER — PREDNISONE 20 MG/1
60 TABLET ORAL
Status: COMPLETED | OUTPATIENT
Start: 2017-09-25 | End: 2017-09-25

## 2017-09-25 RX ORDER — ALBUTEROL SULFATE 90 UG/1
2 AEROSOL, METERED RESPIRATORY (INHALATION) EVERY 6 HOURS PRN
COMMUNITY
End: 2018-03-12 | Stop reason: SDUPTHER

## 2017-09-25 RX ADMIN — IPRATROPIUM BROMIDE AND ALBUTEROL SULFATE 3 ML: .5; 3 SOLUTION RESPIRATORY (INHALATION) at 10:09

## 2017-09-25 RX ADMIN — PREDNISONE 60 MG: 20 TABLET ORAL at 09:09

## 2017-09-25 NOTE — ED NOTES
Appearance: Pt awake, alert & oriented to person, place & time. Pt in no acute distress at present time.   Skin: Skin warm, dry & intact. Mucous membranes moist. Skin turgor normal.   Respiratory: Respirations even, non-labored. Productive congested cough noted in exam room. Scattered insp/exp wheeze noted. Rhonchi noted to right lobe.   Neurologic: Pt moving all extremities without difficulty. Sensation intact.   Peripheral Vascular: All peripheral pulses present.

## 2017-09-25 NOTE — ED PROVIDER NOTES
Encounter Date: 2017    SCRIBE #1 NOTE: I, Rachel Gomes, am scribing for, and in the presence of, . I have scribed the following portions of the note - Other sections scribed: Disposition.       History     Chief Complaint   Patient presents with    Asthma     37 y.o. female with medical history of HTN, asthma, sickle cell beta thalasemia presents to the ED with asthma exacerbation.  Patient states symptoms began last week and a half continue to become worse.  Patient reports increased use of nebs and rescue inhaler.  Patient also reports taking old prednisone 40 mg for 4 days when symptoms first began.  Only exacerbating factor patient can recall that she had a cold earlier in the month and her daughter had ear infection.  Patient denies fever, chills, nausea, vomiting, chest pain, headache, syncope, lightheadedness.          Review of patient's allergies indicates:   Allergen Reactions    Morphine Itching     Past Medical History:   Diagnosis Date    Asthma     Hypertension     Sickle cell-beta thalassemia disease with pain     Trigeminal neuralgia      Past Surgical History:   Procedure Laterality Date     SECTION      TONSILLECTOMY      TUBAL LIGATION       History reviewed. No pertinent family history.  Social History   Substance Use Topics    Smoking status: Never Smoker    Smokeless tobacco: Never Used    Alcohol use No     Review of Systems   Constitutional: Negative for chills, diaphoresis, fatigue and fever.   HENT: Negative for congestion and sore throat.    Respiratory: Positive for cough, shortness of breath and wheezing.    Cardiovascular: Negative for chest pain.   Gastrointestinal: Negative for diarrhea, nausea and vomiting.   Genitourinary: Negative for dysuria.   Musculoskeletal: Negative for back pain.   Skin: Negative for rash.   Neurological: Negative for weakness.   Hematological: Does not bruise/bleed easily.   Psychiatric/Behavioral: The patient is not nervous/anxious.         Physical Exam     Initial Vitals [09/25/17 0800]   BP Pulse Resp Temp SpO2   (!) 171/99 97 20 99 °F (37.2 °C) 96 %      MAP       123         Physical Exam    Vitals reviewed.  Constitutional: Vital signs are normal. She appears well-developed and well-nourished. She is not diaphoretic. No distress.   HENT:   Head: Normocephalic and atraumatic.   Nose: Nose normal.   Mouth/Throat: Oropharynx is clear and moist. No oropharyngeal exudate.   Eyes: Conjunctivae, EOM and lids are normal. Pupils are equal, round, and reactive to light. Lids are everted and swept, no foreign bodies found. Right eye exhibits no discharge. Left eye exhibits no discharge.   Neck: Trachea normal and normal range of motion. Neck supple.   Cardiovascular: Normal rate, regular rhythm, intact distal pulses and normal pulses.   Pulmonary/Chest: She has wheezes. She exhibits no tenderness.   Abdominal: Soft. Normal appearance and bowel sounds are normal. There is no tenderness. There is no rebound and no guarding.   Musculoskeletal: Normal range of motion. She exhibits no edema or tenderness.   Neurological: She is alert and oriented to person, place, and time. No sensory deficit.   Skin: Skin is warm. Capillary refill takes less than 2 seconds. No rash noted. No cyanosis.   Psychiatric: She has a normal mood and affect.         ED Course   Procedures  Labs Reviewed   POCT URINE PREGNANCY        Imaging Results          X-Ray Chest PA And Lateral (Final result)  Result time 09/25/17 09:32:37    Final result by Luis Manuel Reed MD (09/25/17 09:32:37)                 Impression:     See above      Electronically signed by: Luis Manuel Reed MD  Date:     09/25/17  Time:    09:32              Narrative:    Cardiac size is within the upper limits of normal.  Interstitial markings in the lungs are slightly increased.  No consolidation or pleural fluid is seen.                                 Medical Decision Making:   History:   Old Medical  Records: I decided to obtain old medical records.  Old Records Summarized: records from clinic visits.  Clinical Tests:   Lab Tests: Ordered and Reviewed  Radiological Study: Ordered and Reviewed  Medical Tests: Ordered and Reviewed       APC / Resident Notes:   37 y.o. female with medical history of HTN, asthma, sickle cell beta thalasemia presents to the ED with asthma exacerbation.    DDX includes but is not limited to asthma exacerbation, bronchitis, pneumonia.  Will give oral prednisone 60 mg and duoneb given.  Ordered chest x-ray, EKG and UPT. UPT negative. Chest xray benign. EKG shows NSR.  On reassessment, decrease in wheezing and patient reports feeling better. Discharged to home in stable condition, return to ED warnings given, follow up and patient care instructions given. Will discharge home with prednisone 40mg.     I discussed and reviewed with my supervising physician.       Scribe Attestation:   Scribe #1: I performed the above scribed service and the documentation accurately describes the services I performed. I attest to the accuracy of the note.    Attending Attestation:     Physician Attestation Statement for NP/PA:   I discussed this assessment and plan of this patient with the NP/PA, but I did not personally examine the patient. The face to face encounter was performed by the NP/PA.    Other NP/PA Attestation Additions:      Medical Decision Making: Asthma exacerbation       Physician Attestation for Scribe:  Physician Attestation Statement for Scribe #1: I, Daiana Vidal PA-C, reviewed documentation, as scribed by Rachel Gomes in my presence, and it is both accurate and complete.                 ED Course      Clinical Impression:   The primary encounter diagnosis was Asthma exacerbation. A diagnosis of Wheezing was also pertinent to this visit.    Disposition:   Disposition: Discharged  Condition: Stable                        Daiana Vidal PA-C  09/25/17 1834       Sugar HOLLIS  MD Naty  09/27/17 1146

## 2017-09-25 NOTE — ED TRIAGE NOTES
"Pt reports that "I have been wheezing since last week" - pt reports that she has been using nebulizer and hand held inhaler more often in the last couple days. She states "I think my period makes my asthma worse" - she also reports taking motrin yesterday and feels like this made her asthma worse. She states she has some old prednisone 40mg once daily for 4 days that she took - reports initially feeling better but then getting worse again. Reports clear productive cough. Denies fevers. States pulse ox decreased to 88% last night for about 30 min then camw up after neb tx.   "

## 2017-09-28 ENCOUNTER — TELEPHONE (OUTPATIENT)
Dept: HEMATOLOGY/ONCOLOGY | Facility: CLINIC | Age: 37
End: 2017-09-28

## 2017-09-28 DIAGNOSIS — D57.40 SICKLE CELL BETA THALASSEMIA: ICD-10-CM

## 2017-09-28 RX ORDER — OXYCODONE AND ACETAMINOPHEN 10; 325 MG/1; MG/1
1 TABLET ORAL EVERY 4 HOURS PRN
Qty: 120 TABLET | Refills: 0 | Status: SHIPPED | OUTPATIENT
Start: 2017-09-28 | End: 2017-10-30 | Stop reason: SDUPTHER

## 2017-09-28 NOTE — TELEPHONE ENCOUNTER
----- Message from Rachel Maravilla sent at 9/28/2017  2:22 PM CDT -----  Contact: Pt  Pt calling requesting a refill on her pain medication    Pt call back number [phone]  Spoke with pt at 345pm on 09/28/17 and explained that her refill request has been approved and sent to her pharmacy, pt verbalized understanding.  Dayna

## 2017-09-28 NOTE — TELEPHONE ENCOUNTER
----- Message from Rachel Maravilla sent at 9/28/2017 12:12 PM CDT -----  Contact: pt  Pt calling asking if she can go to the pain clinic     Pt call back number [phone]  Explained to the pt that the clinics are at max capacity and the [facility] is currently on back order for pain medication. Pt states she will wait for her refill to be approved. Instructed pt that if pain continues or worsens that she should go to the nearest emergency room, pt verbalized understanding  Dayna

## 2017-09-28 NOTE — TELEPHONE ENCOUNTER
----- Message from Rachel Maravilla sent at 9/28/2017 11:00 AM CDT -----  Contact: Pt  Pt calling to get a refill on oxycodone-acetaminophen (PERCOCET)  mg per tablet  Send to [facility]

## 2017-09-29 ENCOUNTER — HOSPITAL ENCOUNTER (INPATIENT)
Facility: HOSPITAL | Age: 37
LOS: 4 days | Discharge: HOME | DRG: 812 | End: 2017-10-03
Attending: EMERGENCY MEDICINE | Admitting: HOSPITALIST
Payer: MEDICAID

## 2017-09-29 ENCOUNTER — TELEPHONE (OUTPATIENT)
Dept: HEMATOLOGY/ONCOLOGY | Facility: CLINIC | Age: 37
End: 2017-09-29

## 2017-09-29 DIAGNOSIS — E66.01 MORBID OBESITY DUE TO EXCESS CALORIES: ICD-10-CM

## 2017-09-29 DIAGNOSIS — D57.00 SICKLE CELL PAIN CRISIS: Primary | ICD-10-CM

## 2017-09-29 LAB
ALBUMIN SERPL BCP-MCNC: 3.7 G/DL
ALP SERPL-CCNC: 98 U/L
ALT SERPL W/O P-5'-P-CCNC: 12 U/L
ANION GAP SERPL CALC-SCNC: 9 MMOL/L
ANISOCYTOSIS BLD QL SMEAR: SLIGHT
APAP SERPL-MCNC: 9 UG/ML
AST SERPL-CCNC: 14 U/L
B-HCG UR QL: NEGATIVE
BASOPHILS # BLD AUTO: 0.03 K/UL
BASOPHILS NFR BLD: 0.3 %
BILIRUB SERPL-MCNC: 0.6 MG/DL
BILIRUB UR QL STRIP: NEGATIVE
BNP SERPL-MCNC: 16 PG/ML
BUN SERPL-MCNC: 9 MG/DL
CALCIUM SERPL-MCNC: 9.1 MG/DL
CHLORIDE SERPL-SCNC: 105 MMOL/L
CLARITY UR REFRACT.AUTO: CLEAR
CO2 SERPL-SCNC: 26 MMOL/L
COLOR UR AUTO: NORMAL
CREAT SERPL-MCNC: 0.9 MG/DL
CTP QC/QA: YES
DACRYOCYTES BLD QL SMEAR: ABNORMAL
DIFFERENTIAL METHOD: ABNORMAL
EOSINOPHIL # BLD AUTO: 0.2 K/UL
EOSINOPHIL NFR BLD: 2.1 %
ERYTHROCYTE [DISTWIDTH] IN BLOOD BY AUTOMATED COUNT: 16.5 %
EST. GFR  (AFRICAN AMERICAN): >60 ML/MIN/1.73 M^2
EST. GFR  (NON AFRICAN AMERICAN): >60 ML/MIN/1.73 M^2
GLUCOSE SERPL-MCNC: 148 MG/DL
GLUCOSE UR QL STRIP: NEGATIVE
HAPTOGLOB SERPL-MCNC: <10 MG/DL
HCT VFR BLD AUTO: 33.4 %
HGB BLD-MCNC: 11.5 G/DL
HGB UR QL STRIP: NEGATIVE
HYPOCHROMIA BLD QL SMEAR: ABNORMAL
KETONES UR QL STRIP: NEGATIVE
LDH SERPL L TO P-CCNC: 303 U/L
LEUKOCYTE ESTERASE UR QL STRIP: NEGATIVE
LYMPHOCYTES # BLD AUTO: 4.8 K/UL
LYMPHOCYTES NFR BLD: 42.5 %
MCH RBC QN AUTO: 24.9 PG
MCHC RBC AUTO-ENTMCNC: 34.4 G/DL
MCV RBC AUTO: 72 FL
MONOCYTES # BLD AUTO: 1.3 K/UL
MONOCYTES NFR BLD: 11.4 %
NEUTROPHILS # BLD AUTO: 4.8 K/UL
NEUTROPHILS NFR BLD: 43.7 %
NITRITE UR QL STRIP: NEGATIVE
PH UR STRIP: 6 [PH] (ref 5–8)
PLATELET # BLD AUTO: 173 K/UL
PLATELET BLD QL SMEAR: ABNORMAL
PMV BLD AUTO: 8.6 FL
POIKILOCYTOSIS BLD QL SMEAR: SLIGHT
POLYCHROMASIA BLD QL SMEAR: ABNORMAL
POTASSIUM SERPL-SCNC: 3.4 MMOL/L
PROT SERPL-MCNC: 7.8 G/DL
PROT UR QL STRIP: NEGATIVE
RBC # BLD AUTO: 4.62 M/UL
RETICS/RBC NFR AUTO: 4.7 %
SODIUM SERPL-SCNC: 140 MMOL/L
SP GR UR STRIP: 1.01 (ref 1–1.03)
TROPONIN I SERPL DL<=0.01 NG/ML-MCNC: <0.006 NG/ML
URN SPEC COLLECT METH UR: NORMAL
UROBILINOGEN UR STRIP-ACNC: NEGATIVE EU/DL
WBC # BLD AUTO: 11.19 K/UL

## 2017-09-29 PROCEDURE — A4216 STERILE WATER/SALINE, 10 ML: HCPCS | Performed by: STUDENT IN AN ORGANIZED HEALTH CARE EDUCATION/TRAINING PROGRAM

## 2017-09-29 PROCEDURE — 11000001 HC ACUTE MED/SURG PRIVATE ROOM

## 2017-09-29 PROCEDURE — 84484 ASSAY OF TROPONIN QUANT: CPT

## 2017-09-29 PROCEDURE — 83880 ASSAY OF NATRIURETIC PEPTIDE: CPT

## 2017-09-29 PROCEDURE — 96376 TX/PRO/DX INJ SAME DRUG ADON: CPT

## 2017-09-29 PROCEDURE — 81003 URINALYSIS AUTO W/O SCOPE: CPT

## 2017-09-29 PROCEDURE — 94640 AIRWAY INHALATION TREATMENT: CPT

## 2017-09-29 PROCEDURE — 99285 EMERGENCY DEPT VISIT HI MDM: CPT | Mod: 25

## 2017-09-29 PROCEDURE — 83615 LACTATE (LD) (LDH) ENZYME: CPT

## 2017-09-29 PROCEDURE — 85045 AUTOMATED RETICULOCYTE COUNT: CPT

## 2017-09-29 PROCEDURE — 81025 URINE PREGNANCY TEST: CPT

## 2017-09-29 PROCEDURE — 25000003 PHARM REV CODE 250: Performed by: STUDENT IN AN ORGANIZED HEALTH CARE EDUCATION/TRAINING PROGRAM

## 2017-09-29 PROCEDURE — 99285 EMERGENCY DEPT VISIT HI MDM: CPT | Mod: ,,,

## 2017-09-29 PROCEDURE — 25000242 PHARM REV CODE 250 ALT 637 W/ HCPCS: Performed by: STUDENT IN AN ORGANIZED HEALTH CARE EDUCATION/TRAINING PROGRAM

## 2017-09-29 PROCEDURE — 99223 1ST HOSP IP/OBS HIGH 75: CPT | Mod: ,,, | Performed by: HOSPITALIST

## 2017-09-29 PROCEDURE — 80329 ANALGESICS NON-OPIOID 1 OR 2: CPT

## 2017-09-29 PROCEDURE — 93005 ELECTROCARDIOGRAM TRACING: CPT

## 2017-09-29 PROCEDURE — 96374 THER/PROPH/DIAG INJ IV PUSH: CPT

## 2017-09-29 PROCEDURE — 63600175 PHARM REV CODE 636 W HCPCS: Performed by: STUDENT IN AN ORGANIZED HEALTH CARE EDUCATION/TRAINING PROGRAM

## 2017-09-29 PROCEDURE — 83010 ASSAY OF HAPTOGLOBIN QUANT: CPT

## 2017-09-29 PROCEDURE — 80053 COMPREHEN METABOLIC PANEL: CPT

## 2017-09-29 PROCEDURE — 25000003 PHARM REV CODE 250

## 2017-09-29 PROCEDURE — S5010 5% DEXTROSE AND 0.45% SALINE: HCPCS | Performed by: STUDENT IN AN ORGANIZED HEALTH CARE EDUCATION/TRAINING PROGRAM

## 2017-09-29 PROCEDURE — 27000221 HC OXYGEN, UP TO 24 HOURS

## 2017-09-29 PROCEDURE — 93010 ELECTROCARDIOGRAM REPORT: CPT | Mod: ,,, | Performed by: INTERNAL MEDICINE

## 2017-09-29 PROCEDURE — 85025 COMPLETE CBC W/AUTO DIFF WBC: CPT

## 2017-09-29 PROCEDURE — 96375 TX/PRO/DX INJ NEW DRUG ADDON: CPT

## 2017-09-29 PROCEDURE — 63600175 PHARM REV CODE 636 W HCPCS

## 2017-09-29 PROCEDURE — 25000003 PHARM REV CODE 250: Performed by: EMERGENCY MEDICINE

## 2017-09-29 PROCEDURE — 27100107 HC POCKET PEAK FLOW METER

## 2017-09-29 PROCEDURE — 99900035 HC TECH TIME PER 15 MIN (STAT)

## 2017-09-29 RX ORDER — BACLOFEN 10 MG/1
10 TABLET ORAL 2 TIMES DAILY
Status: DISCONTINUED | OUTPATIENT
Start: 2017-09-29 | End: 2017-09-29

## 2017-09-29 RX ORDER — HYDROMORPHONE HYDROCHLORIDE 1 MG/ML
1 INJECTION, SOLUTION INTRAMUSCULAR; INTRAVENOUS; SUBCUTANEOUS
Status: DISCONTINUED | OUTPATIENT
Start: 2017-09-29 | End: 2017-09-29

## 2017-09-29 RX ORDER — ONDANSETRON 2 MG/ML
4 INJECTION INTRAMUSCULAR; INTRAVENOUS EVERY 12 HOURS PRN
Status: CANCELLED | OUTPATIENT
Start: 2017-09-29

## 2017-09-29 RX ORDER — DEXTROSE MONOHYDRATE AND SODIUM CHLORIDE 5; .45 G/100ML; G/100ML
INJECTION, SOLUTION INTRAVENOUS CONTINUOUS
Status: DISCONTINUED | OUTPATIENT
Start: 2017-09-29 | End: 2017-10-03

## 2017-09-29 RX ORDER — HYDROCODONE BITARTRATE AND ACETAMINOPHEN 5; 325 MG/1; MG/1
1 TABLET ORAL EVERY 4 HOURS PRN
Status: CANCELLED | OUTPATIENT
Start: 2017-09-29

## 2017-09-29 RX ORDER — HYDRALAZINE HYDROCHLORIDE 25 MG/1
100 TABLET, FILM COATED ORAL
Status: COMPLETED | OUTPATIENT
Start: 2017-09-29 | End: 2017-09-29

## 2017-09-29 RX ORDER — HYDRALAZINE HYDROCHLORIDE 50 MG/1
100 TABLET, FILM COATED ORAL EVERY 8 HOURS
Status: DISCONTINUED | OUTPATIENT
Start: 2017-09-29 | End: 2017-10-03 | Stop reason: HOSPADM

## 2017-09-29 RX ORDER — SODIUM CHLORIDE 9 MG/ML
1000 INJECTION, SOLUTION INTRAVENOUS
Status: COMPLETED | OUTPATIENT
Start: 2017-09-29 | End: 2017-09-29

## 2017-09-29 RX ORDER — HYDROMORPHONE HYDROCHLORIDE 1 MG/ML
2 INJECTION, SOLUTION INTRAMUSCULAR; INTRAVENOUS; SUBCUTANEOUS EVERY 4 HOURS
Status: COMPLETED | OUTPATIENT
Start: 2017-09-29 | End: 2017-09-30

## 2017-09-29 RX ORDER — HYDRALAZINE HYDROCHLORIDE 25 MG/1
100 TABLET, FILM COATED ORAL EVERY 8 HOURS
Status: DISCONTINUED | OUTPATIENT
Start: 2017-09-29 | End: 2017-09-29

## 2017-09-29 RX ORDER — DIPHENHYDRAMINE HCL 25 MG
25 CAPSULE ORAL
Status: COMPLETED | OUTPATIENT
Start: 2017-09-29 | End: 2017-09-29

## 2017-09-29 RX ORDER — ONDANSETRON 4 MG/1
8 TABLET, ORALLY DISINTEGRATING ORAL EVERY 8 HOURS PRN
Status: DISCONTINUED | OUTPATIENT
Start: 2017-09-29 | End: 2017-10-03 | Stop reason: HOSPADM

## 2017-09-29 RX ORDER — CARVEDILOL 25 MG/1
25 TABLET ORAL ONCE
Status: DISCONTINUED | OUTPATIENT
Start: 2017-09-29 | End: 2017-09-30

## 2017-09-29 RX ORDER — CARVEDILOL 12.5 MG/1
25 TABLET ORAL 2 TIMES DAILY
Status: DISCONTINUED | OUTPATIENT
Start: 2017-09-29 | End: 2017-09-29

## 2017-09-29 RX ORDER — OXYCODONE HYDROCHLORIDE 5 MG/1
10 TABLET ORAL EVERY 4 HOURS PRN
Status: DISCONTINUED | OUTPATIENT
Start: 2017-10-01 | End: 2017-09-30

## 2017-09-29 RX ORDER — AMOXICILLIN 250 MG
1 CAPSULE ORAL 2 TIMES DAILY
Status: DISCONTINUED | OUTPATIENT
Start: 2017-09-29 | End: 2017-10-03

## 2017-09-29 RX ORDER — ENOXAPARIN SODIUM 100 MG/ML
40 INJECTION SUBCUTANEOUS
Status: DISCONTINUED | OUTPATIENT
Start: 2017-09-29 | End: 2017-10-03 | Stop reason: HOSPADM

## 2017-09-29 RX ORDER — CARVEDILOL 12.5 MG/1
25 TABLET ORAL 2 TIMES DAILY
Status: DISCONTINUED | OUTPATIENT
Start: 2017-09-30 | End: 2017-09-29

## 2017-09-29 RX ORDER — AMLODIPINE BESYLATE 10 MG/1
10 TABLET ORAL DAILY
Status: DISCONTINUED | OUTPATIENT
Start: 2017-09-29 | End: 2017-09-29

## 2017-09-29 RX ORDER — HYDROXYUREA 500 MG/1
500 CAPSULE ORAL DAILY
Status: DISCONTINUED | OUTPATIENT
Start: 2017-09-29 | End: 2017-10-03 | Stop reason: HOSPADM

## 2017-09-29 RX ORDER — AMLODIPINE BESYLATE 10 MG/1
10 TABLET ORAL DAILY
Status: DISCONTINUED | OUTPATIENT
Start: 2017-09-30 | End: 2017-10-03 | Stop reason: HOSPADM

## 2017-09-29 RX ORDER — HYDROMORPHONE HYDROCHLORIDE 1 MG/ML
1 INJECTION, SOLUTION INTRAMUSCULAR; INTRAVENOUS; SUBCUTANEOUS
Status: COMPLETED | OUTPATIENT
Start: 2017-09-29 | End: 2017-09-29

## 2017-09-29 RX ORDER — FLUOXETINE HYDROCHLORIDE 20 MG/1
40 CAPSULE ORAL DAILY
Status: DISCONTINUED | OUTPATIENT
Start: 2017-09-29 | End: 2017-10-03 | Stop reason: HOSPADM

## 2017-09-29 RX ORDER — DIPHENHYDRAMINE HYDROCHLORIDE 50 MG/ML
25 INJECTION INTRAMUSCULAR; INTRAVENOUS EVERY 4 HOURS PRN
Status: DISCONTINUED | OUTPATIENT
Start: 2017-09-29 | End: 2017-10-03

## 2017-09-29 RX ORDER — SODIUM CHLORIDE 9 MG/ML
1000 INJECTION, SOLUTION INTRAVENOUS
Status: DISCONTINUED | OUTPATIENT
Start: 2017-09-29 | End: 2017-09-29

## 2017-09-29 RX ORDER — CARBAMAZEPINE 200 MG/1
400 TABLET ORAL 2 TIMES DAILY
Status: DISCONTINUED | OUTPATIENT
Start: 2017-09-29 | End: 2017-10-03 | Stop reason: HOSPADM

## 2017-09-29 RX ORDER — ALBUTEROL SULFATE 90 UG/1
2 AEROSOL, METERED RESPIRATORY (INHALATION) EVERY 6 HOURS PRN
Status: DISCONTINUED | OUTPATIENT
Start: 2017-09-29 | End: 2017-10-03 | Stop reason: HOSPADM

## 2017-09-29 RX ORDER — CARVEDILOL 25 MG/1
25 TABLET ORAL 2 TIMES DAILY
Status: DISCONTINUED | OUTPATIENT
Start: 2017-09-30 | End: 2017-09-30

## 2017-09-29 RX ORDER — AMLODIPINE BESYLATE 10 MG/1
10 TABLET ORAL
Status: DISCONTINUED | OUTPATIENT
Start: 2017-09-29 | End: 2017-09-29

## 2017-09-29 RX ORDER — AMLODIPINE BESYLATE 10 MG/1
10 TABLET ORAL
Status: COMPLETED | OUTPATIENT
Start: 2017-09-29 | End: 2017-09-29

## 2017-09-29 RX ORDER — ONDANSETRON 8 MG/1
8 TABLET, ORALLY DISINTEGRATING ORAL EVERY 6 HOURS PRN
Status: DISCONTINUED | OUTPATIENT
Start: 2017-09-29 | End: 2017-09-29

## 2017-09-29 RX ORDER — MORPHINE SULFATE 2 MG/ML
4 INJECTION, SOLUTION INTRAMUSCULAR; INTRAVENOUS EVERY 4 HOURS PRN
Status: CANCELLED | OUTPATIENT
Start: 2017-09-29

## 2017-09-29 RX ORDER — NALOXONE HCL 0.4 MG/ML
0.02 VIAL (ML) INJECTION
Status: CANCELLED | OUTPATIENT
Start: 2017-09-29

## 2017-09-29 RX ORDER — AMOXICILLIN 250 MG
1 CAPSULE ORAL 2 TIMES DAILY
Status: DISCONTINUED | OUTPATIENT
Start: 2017-09-29 | End: 2017-09-29

## 2017-09-29 RX ORDER — KETOROLAC TROMETHAMINE 15 MG/ML
30 INJECTION, SOLUTION INTRAMUSCULAR; INTRAVENOUS EVERY 8 HOURS
Status: COMPLETED | OUTPATIENT
Start: 2017-09-29 | End: 2017-09-30

## 2017-09-29 RX ORDER — IPRATROPIUM BROMIDE AND ALBUTEROL SULFATE 2.5; .5 MG/3ML; MG/3ML
3 SOLUTION RESPIRATORY (INHALATION) EVERY 4 HOURS PRN
Status: DISCONTINUED | OUTPATIENT
Start: 2017-09-29 | End: 2017-09-29

## 2017-09-29 RX ORDER — DEXTROSE MONOHYDRATE AND SODIUM CHLORIDE 5; .45 G/100ML; G/100ML
INJECTION, SOLUTION INTRAVENOUS CONTINUOUS
Status: DISCONTINUED | OUTPATIENT
Start: 2017-09-29 | End: 2017-09-29

## 2017-09-29 RX ORDER — GABAPENTIN 300 MG/1
300 CAPSULE ORAL 2 TIMES DAILY
Status: DISCONTINUED | OUTPATIENT
Start: 2017-09-29 | End: 2017-09-29

## 2017-09-29 RX ORDER — KETOROLAC TROMETHAMINE 30 MG/ML
30 INJECTION, SOLUTION INTRAMUSCULAR; INTRAVENOUS EVERY 8 HOURS
Status: DISCONTINUED | OUTPATIENT
Start: 2017-09-29 | End: 2017-09-29

## 2017-09-29 RX ORDER — PREDNISONE 20 MG/1
40 TABLET ORAL DAILY
Status: DISCONTINUED | OUTPATIENT
Start: 2017-09-29 | End: 2017-09-29

## 2017-09-29 RX ORDER — CARBAMAZEPINE 200 MG/1
400 TABLET ORAL 2 TIMES DAILY
Status: DISCONTINUED | OUTPATIENT
Start: 2017-09-29 | End: 2017-09-29

## 2017-09-29 RX ORDER — BACLOFEN 10 MG/1
10 TABLET ORAL 2 TIMES DAILY
Status: DISCONTINUED | OUTPATIENT
Start: 2017-09-29 | End: 2017-10-03 | Stop reason: HOSPADM

## 2017-09-29 RX ORDER — SODIUM CHLORIDE 0.9 % (FLUSH) 0.9 %
3 SYRINGE (ML) INJECTION EVERY 8 HOURS
Status: DISCONTINUED | OUTPATIENT
Start: 2017-09-29 | End: 2017-10-03 | Stop reason: HOSPADM

## 2017-09-29 RX ORDER — ERGOCALCIFEROL 1.25 MG/1
50000 CAPSULE ORAL
Status: DISCONTINUED | OUTPATIENT
Start: 2017-09-29 | End: 2017-10-03 | Stop reason: HOSPADM

## 2017-09-29 RX ORDER — GABAPENTIN 300 MG/1
300 CAPSULE ORAL 2 TIMES DAILY
Status: DISCONTINUED | OUTPATIENT
Start: 2017-09-29 | End: 2017-10-03 | Stop reason: HOSPADM

## 2017-09-29 RX ORDER — IPRATROPIUM BROMIDE AND ALBUTEROL SULFATE 2.5; .5 MG/3ML; MG/3ML
3 SOLUTION RESPIRATORY (INHALATION) EVERY 4 HOURS
Status: DISCONTINUED | OUTPATIENT
Start: 2017-09-29 | End: 2017-10-01

## 2017-09-29 RX ADMIN — HYDROMORPHONE HYDROCHLORIDE 2 MG: 1 INJECTION, SOLUTION INTRAMUSCULAR; INTRAVENOUS; SUBCUTANEOUS at 10:09

## 2017-09-29 RX ADMIN — IPRATROPIUM BROMIDE AND ALBUTEROL SULFATE 3 ML: .5; 3 SOLUTION RESPIRATORY (INHALATION) at 08:09

## 2017-09-29 RX ADMIN — SODIUM CHLORIDE 1000 ML: 0.9 INJECTION, SOLUTION INTRAVENOUS at 07:09

## 2017-09-29 RX ADMIN — Medication 3 ML: at 05:09

## 2017-09-29 RX ADMIN — ENOXAPARIN SODIUM 40 MG: 100 INJECTION SUBCUTANEOUS at 01:09

## 2017-09-29 RX ADMIN — KETOROLAC TROMETHAMINE 30 MG: 15 INJECTION, SOLUTION INTRAMUSCULAR; INTRAVENOUS at 05:09

## 2017-09-29 RX ADMIN — IPRATROPIUM BROMIDE AND ALBUTEROL SULFATE 3 ML: .5; 3 SOLUTION RESPIRATORY (INHALATION) at 02:09

## 2017-09-29 RX ADMIN — BACLOFEN 10 MG: 10 TABLET ORAL at 08:09

## 2017-09-29 RX ADMIN — HYDROMORPHONE HYDROCHLORIDE 2 MG: 1 INJECTION, SOLUTION INTRAMUSCULAR; INTRAVENOUS; SUBCUTANEOUS at 01:09

## 2017-09-29 RX ADMIN — HYDROMORPHONE HYDROCHLORIDE 1 MG: 1 INJECTION, SOLUTION INTRAMUSCULAR; INTRAVENOUS; SUBCUTANEOUS at 10:09

## 2017-09-29 RX ADMIN — AMLODIPINE BESYLATE 10 MG: 10 TABLET ORAL at 09:09

## 2017-09-29 RX ADMIN — DIPHENHYDRAMINE HYDROCHLORIDE 25 MG: 25 CAPSULE ORAL at 08:09

## 2017-09-29 RX ADMIN — HYDRALAZINE HYDROCHLORIDE 100 MG: 50 TABLET ORAL at 05:09

## 2017-09-29 RX ADMIN — GABAPENTIN 300 MG: 300 CAPSULE ORAL at 08:09

## 2017-09-29 RX ADMIN — HYDROMORPHONE HYDROCHLORIDE 2 MG: 1 INJECTION, SOLUTION INTRAMUSCULAR; INTRAVENOUS; SUBCUTANEOUS at 05:09

## 2017-09-29 RX ADMIN — FLUOXETINE 40 MG: 20 CAPSULE ORAL at 01:09

## 2017-09-29 RX ADMIN — DIPHENHYDRAMINE HYDROCHLORIDE 25 MG: 50 INJECTION, SOLUTION INTRAMUSCULAR; INTRAVENOUS at 02:09

## 2017-09-29 RX ADMIN — HYDROMORPHONE HYDROCHLORIDE 1 MG: 1 INJECTION, SOLUTION INTRAMUSCULAR; INTRAVENOUS; SUBCUTANEOUS at 07:09

## 2017-09-29 RX ADMIN — HYDRALAZINE HYDROCHLORIDE 100 MG: 25 TABLET, FILM COATED ORAL at 10:09

## 2017-09-29 RX ADMIN — KETOROLAC TROMETHAMINE 30 MG: 15 INJECTION, SOLUTION INTRAMUSCULAR; INTRAVENOUS at 10:09

## 2017-09-29 RX ADMIN — DIPHENHYDRAMINE HYDROCHLORIDE 25 MG: 50 INJECTION, SOLUTION INTRAMUSCULAR; INTRAVENOUS at 05:09

## 2017-09-29 RX ADMIN — DEXTROSE MONOHYDRATE AND SODIUM CHLORIDE 1000 ML: 5; .45 INJECTION, SOLUTION INTRAVENOUS at 01:09

## 2017-09-29 RX ADMIN — CARBAMAZEPINE 400 MG: 200 TABLET ORAL at 08:09

## 2017-09-29 RX ADMIN — STANDARDIZED SENNA CONCENTRATE AND DOCUSATE SODIUM 1 TABLET: 8.6; 5 TABLET, FILM COATED ORAL at 01:09

## 2017-09-29 RX ADMIN — IPRATROPIUM BROMIDE AND ALBUTEROL SULFATE 3 ML: .5; 3 SOLUTION RESPIRATORY (INHALATION) at 11:09

## 2017-09-29 RX ADMIN — DIPHENHYDRAMINE HYDROCHLORIDE 25 MG: 50 INJECTION, SOLUTION INTRAMUSCULAR; INTRAVENOUS at 10:09

## 2017-09-29 RX ADMIN — DEXTROSE MONOHYDRATE AND SODIUM CHLORIDE: 5; .45 INJECTION, SOLUTION INTRAVENOUS at 10:09

## 2017-09-29 RX ADMIN — STANDARDIZED SENNA CONCENTRATE AND DOCUSATE SODIUM 1 TABLET: 8.6; 5 TABLET, FILM COATED ORAL at 08:09

## 2017-09-29 RX ADMIN — HYDROMORPHONE HYDROCHLORIDE 1 MG: 1 INJECTION, SOLUTION INTRAMUSCULAR; INTRAVENOUS; SUBCUTANEOUS at 08:09

## 2017-09-29 NOTE — ED PROVIDER NOTES
Encounter Date: 2017       History     Chief Complaint   Patient presents with    Sickle Cell Pain Crisis     onset yesterday - having pain in left arm and neck.     37 y.o. Female With medical history of HTN, sickle cell beta thalassemia, and asthma presents to ED in sickle cell pain crisis.  Patient endorses chest pain, nausea, shortness of breath, denies wheezing.  Patient seen in the ED last week for asthma exacerbation.  Patient also states she is having left-sided neck pain that radiates into her left arm.  Patient denies fever, chills, vomiting, abdominal pain, changes in urination, changes in bowel, weakness, syncope, headache, changes in vision, cough, congestion.          Review of patient's allergies indicates:   Allergen Reactions    Morphine Itching     Past Medical History:   Diagnosis Date    Asthma     Hypertension     Sickle cell-beta thalassemia disease with pain     Trigeminal neuralgia      Past Surgical History:   Procedure Laterality Date     SECTION      TONSILLECTOMY      TUBAL LIGATION       No family history on file.  Social History   Substance Use Topics    Smoking status: Never Smoker    Smokeless tobacco: Never Used    Alcohol use No     Review of Systems   Constitutional: Positive for fatigue. Negative for chills, diaphoresis and fever.   HENT: Negative for sore throat.    Respiratory: Positive for shortness of breath. Negative for cough.    Cardiovascular: Positive for chest pain.   Gastrointestinal: Positive for nausea. Negative for abdominal pain and vomiting.   Genitourinary: Negative for dysuria.   Musculoskeletal: Positive for myalgias and neck pain. Negative for arthralgias, back pain, joint swelling and neck stiffness.   Skin: Negative for rash.   Neurological: Negative for weakness.   Hematological: Does not bruise/bleed easily.       Physical Exam     Initial Vitals [17 0716]   BP Pulse Resp Temp SpO2   (!) 184/87 96 18 98.5 °F (36.9 °C) 95 %       MAP       119.33         Physical Exam    Vitals reviewed.  Constitutional: Vital signs are normal. She appears well-developed and well-nourished. She is not diaphoretic. No distress.   HENT:   Head: Normocephalic and atraumatic.   Nose: Nose normal.   Mouth/Throat: Oropharynx is clear and moist.   Eyes: Conjunctivae and lids are normal. Pupils are equal, round, and reactive to light. Lids are everted and swept, no foreign bodies found.   Neck: Trachea normal and normal range of motion. Neck supple.   Cardiovascular: Normal rate, regular rhythm, intact distal pulses and normal pulses.   Pulmonary/Chest: Breath sounds normal. She has no wheezes. She has no rhonchi. She has no rales. She exhibits tenderness.   Abdominal: Soft. Normal appearance and bowel sounds are normal. There is no tenderness. There is no rebound and no guarding.   Musculoskeletal: Normal range of motion. She exhibits tenderness. She exhibits no edema.        Cervical back: She exhibits tenderness. She exhibits no bony tenderness.        Left upper arm: She exhibits tenderness. She exhibits no bony tenderness, no swelling, no edema, no deformity and no laceration.   Neurological: She is alert and oriented to person, place, and time. She has normal strength. No cranial nerve deficit or sensory deficit.   Skin: Skin is warm. Capillary refill takes less than 2 seconds. No rash noted. No cyanosis.   Psychiatric: She has a normal mood and affect.         ED Course   Procedures  Labs Reviewed   CBC W/ AUTO DIFFERENTIAL - Abnormal; Notable for the following:        Result Value    Hemoglobin 11.5 (*)     Hematocrit 33.4 (*)     MCV 72 (*)     MCH 24.9 (*)     RDW 16.5 (*)     MPV 8.6 (*)     Mono # 1.3 (*)     All other components within normal limits   COMPREHENSIVE METABOLIC PANEL - Abnormal; Notable for the following:     Potassium 3.4 (*)     Glucose 148 (*)     All other components within normal limits   RETICULOCYTES - Abnormal; Notable for the  following:     Retic 4.7 (*)     All other components within normal limits   LACTATE DEHYDROGENASE - Abnormal; Notable for the following:      (*)     All other components within normal limits    Narrative:     ADD ON [facility] PER KANA VARELA AT  09/29/2017  13:47    HAPTOGLOBIN - Abnormal; Notable for the following:     Haptoglobin <10 (*)     All other components within normal limits    Narrative:     ADD ON [facility] PER KANA VARELA AT  09/29/2017  13:47   add on hapt per kana brooks @  09/29/2017  14:32 order #[id number]   ACETAMINOPHEN LEVEL - Abnormal; Notable for the following:     Acetaminophen (Tylenol), Serum 9.0 (*)     All other components within normal limits    Narrative:     ADD ON [facility] PER KANA VARELA AT  09/29/2017  13:47   add on hapt per kana brooks @  09/29/2017  14:32 order #[id number]  ADD ON ACETAMINOPHEN LEVEL PER DR. LILIANE PATEL AT  09/29/2017    15:01    POCT URINE PREGNANCY - Normal   URINALYSIS, REFLEX TO URINE CULTURE    Narrative:     Preferred Collection Type->Urine, Clean Catch   TROPONIN I   B-TYPE NATRIURETIC PEPTIDE   HAPTOGLOBIN   LACTATE DEHYDROGENASE   ACETAMINOPHEN LEVEL        Imaging Results          X-Ray Chest PA And Lateral (Final result)  Result time 09/29/17 09:40:14    Final result by Otto Rahman MD (09/29/17 09:40:14)                 Impression:     Stable chest.      Electronically signed by: CURT RAHMAN MD  Date:     09/29/17  Time:    09:40              Narrative:    The chest 2 views, comparison 9/25/17.  Heart normal.  Chronic appearing Interstitial markings remain prominent mid and lower lung zone particular right.  No new consolidation or pleural reaction.                                 Medical Decision Making:   History:   Old Medical Records: I decided to obtain old medical records.  Old Records Summarized: records from clinic visits.  Clinical Tests:   Lab Tests: Ordered and Reviewed  Radiological Study:  Ordered and Reviewed  Medical Tests: Ordered and Reviewed       APC / Resident Notes:   37 y.o. Female With medical history of HTN, sickle cell beta thalassemia, and asthma presents to ED in sickle cell pain crisis.    DDX includes but is not limited to sickle cell pain crisis, ACS, pneumonia, bronchitis. Will get cardiac labs, reticuloyte, CXR and EKG. Will treat symptoms with IVF, IV zofran 4mg and IV dilaudid 1g. Patient requesting benadryl for itching, will give oral benadryl 25mg. Nurse called me stating patient very demanding and requesting more pain medication, blankets and working herself up over her blood pressure. Patient denies taking home BP meds today so will give home norvasc 10mg and coreg 25mg and another dose of 1mg of dilaudid and IVF. Retic count 4.7, improved from baseline. Troponin negative, CXR benign. All other labs benign. Patient not able to be controlled after 3 rounds of dilaudid so will admit to medicine team.     12:12 PM  Patient confronted me stating I told her that her labs were ok when they were not. After reviewing the labs, her counts were improved from baseline and no signs of ACS. She continued to demand IV dilaudid and benadryl. Gave IV dilaudid and oral benadryl. Pain not controlled with 3 round of IV dilaudid so agreed to admit for pain control. Nurses report that the patient was walking around [facility] on phone with no complications.    I have discussed and reviewed with my supervising physician.         Attending Attestation:     Physician Attestation Statement for NP/PA:   I discussed this assessment and plan of this patient with the NP/PA, but I did not personally examine the patient. The face to face encounter was performed by the NP/PA.                  ED Course      Clinical Impression:   The encounter diagnosis was Sickle cell pain crisis.    Disposition:   Disposition: Admitted  Condition: Stable                        Daiana Vidal PA-C  09/29/17 3888        Neel Rahman MD  10/01/17 2052

## 2017-09-29 NOTE — SUBJECTIVE & OBJECTIVE
Past Medical History:   Diagnosis Date    Asthma     Hypertension     Sickle cell-beta thalassemia disease with pain     Trigeminal neuralgia        Past Surgical History:   Procedure Laterality Date     SECTION      TONSILLECTOMY      TUBAL LIGATION         Review of patient's allergies indicates:   Allergen Reactions    Morphine Itching       No current facility-administered medications on file prior to encounter.      Current Outpatient Prescriptions on File Prior to Encounter   Medication Sig    albuterol (ACCUNEB) 0.63 mg/3 mL Nebu Take 0.63 mg by nebulization every 6 (six) hours as needed. Rescue    albuterol (VENTOLIN HFA) 90 mcg/actuation inhaler Inhale 2 puffs into the lungs every 6 (six) hours as needed for Wheezing. Rescue    amlodipine (NORVASC) 10 MG tablet Take 1 tablet (10 mg total) by mouth once daily.    baclofen (LIORESAL) 10 MG tablet Take 1 tablet (10 mg total) by mouth 2 (two) times daily.    budesonide-formoterol 160-4.5 mcg (SYMBICORT) 160-4.5 mcg/actuation HFAA Inhale 2 puffs into the lungs every 12 (twelve) hours. Controller    carbamazepine (TEGRETOL) 200 mg tablet Take 2 tablets (400 mg total) by mouth 2 (two) times daily.    carvedilol (COREG) 25 MG tablet Take 1 tablet (25 mg total) by mouth 2 (two) times daily.    ergocalciferol (VITAMIN D2) 50,000 unit Cap Take 1 capsule (50,000 Units total) by mouth every 7 days.    fluoxetine (PROZAC) 40 MG capsule Take 40 mg by mouth once daily.    gabapentin (NEURONTIN) 300 MG capsule Take 1 capsule (300 mg total) by mouth 2 (two) times daily.    hydrALAZINE (APRESOLINE) 100 MG tablet Take 1 tablet (100 mg total) by mouth every 8 (eight) hours.    hydrocortisone 1 % cream Apply to affected area 2 times daily    hydroxyurea (HYDREA) 500 mg Cap Take 1 capsule (500 mg total) by mouth once daily.    ondansetron (ZOFRAN-ODT) 8 MG TbDL Take 1 tablet (8 mg total) by mouth every 6 (six) hours as needed.     oxycodone-acetaminophen (PERCOCET)  mg per tablet Take 1 tablet by mouth every 4 (four) hours as needed for Pain.    predniSONE (DELTASONE) 20 MG tablet Take 2 tablets (40 mg total) by mouth once daily.    senna-docusate 8.6-50 mg (PERICOLACE) 8.6-50 mg per tablet Take 1 tablet by mouth 2 (two) times daily.     Family History     None        Social History Main Topics    Smoking status: Never Smoker    Smokeless tobacco: Never Used    Alcohol use No    Drug use: No    Sexual activity: No     Review of Systems   Constitutional: Positive for activity change, appetite change and fatigue. Negative for chills and fever.   HENT: Negative for nosebleeds, postnasal drip, rhinorrhea, sinus pain and sore throat.    Eyes: Negative for visual disturbance.   Respiratory: Positive for cough, chest tightness, shortness of breath and wheezing.    Cardiovascular: Positive for leg swelling. Negative for chest pain and palpitations.   Gastrointestinal: Negative for abdominal distention, abdominal pain, constipation, diarrhea and vomiting.   Endocrine: Negative for polyphagia and polyuria.   Genitourinary: Negative for difficulty urinating, dysuria and hematuria.   Musculoskeletal: Positive for arthralgias, back pain, myalgias and neck pain.   Neurological: Positive for weakness. Negative for seizures and headaches.   Hematological: Negative for adenopathy. Does not bruise/bleed easily.   Psychiatric/Behavioral: The patient is not nervous/anxious.      Objective:     Vital Signs (Most Recent):  Temp: 98.2 °F (36.8 °C) (09/29/17 1247)  Pulse: 77 (09/29/17 1247)  Resp: 20 (09/29/17 1247)  BP: (!) 167/94 (09/29/17 1247)  SpO2: 98 % (09/29/17 1247) Vital Signs (24h Range):  Temp:  [98.2 °F (36.8 °C)-98.5 °F (36.9 °C)] 98.2 °F (36.8 °C)  Pulse:  [77-96] 77  Resp:  [18-20] 20  SpO2:  [95 %-98 %] 98 %  BP: (167-203)/() 167/94     Weight: 136.1 kg (300 lb)  Body mass index is 54.87 kg/m².    Physical Exam   Constitutional:  She is oriented to person, place, and time. She appears well-developed and well-nourished. She appears distressed.   Morbidly obese   HENT:   Head: Normocephalic and atraumatic.   Right Ear: External ear normal.   Left Ear: External ear normal.   Mouth/Throat: Oropharynx is clear and moist. Mucous membranes are dry. No oral lesions. No oropharyngeal exudate.   Eyes: Conjunctivae and EOM are normal. Pupils are equal, round, and reactive to light. Right eye exhibits no discharge. No scleral icterus.   Neck: Normal range of motion. Muscular tenderness present.   Cardiovascular: Normal rate, regular rhythm, S1 normal, S2 normal, normal heart sounds and intact distal pulses.  Exam reveals no gallop and no friction rub.    No murmur heard.  Pulses:       Radial pulses are 2+ on the right side, and 2+ on the left side.        Dorsalis pedis pulses are 2+ on the right side, and 2+ on the left side.   Pulmonary/Chest: Effort normal. No stridor. She has wheezes. She exhibits no tenderness.   Abdominal: Soft. Bowel sounds are normal. She exhibits no mass. There is no tenderness. There is no guarding. No hernia.   Musculoskeletal: Normal range of motion. She exhibits tenderness. She exhibits no edema.        Left shoulder: She exhibits tenderness, bony tenderness and pain. She exhibits no effusion and no crepitus.        Left elbow: Tenderness found.        Left wrist: She exhibits tenderness and bony tenderness.   Lymphadenopathy:     She has no cervical adenopathy.   Neurological: She is alert and oriented to person, place, and time. She displays no tremor. No cranial nerve deficit or sensory deficit. GCS eye subscore is 4. GCS verbal subscore is 5. GCS motor subscore is 6.   Skin: Skin is warm, dry and intact. No rash noted. She is not diaphoretic.   Psychiatric: She has a normal mood and affect.        Significant Labs:   Recent Lab Results       09/29/17  1011 09/29/17  1000 09/29/17  0739 09/29/17  0737      Albumin   3.7       Alkaline Phosphatase   98      ALT   12      Anion Gap   9      Aniso    Slight     Appearance, UA  Clear       AST   14      Baso #    0.03     Basophil%    0.3     Bilirubin (UA)  Negative       Total Bilirubin   0.6  Comment:  For infants and newborns, interpretation of results should be based  on gestational age, weight and in agreement with clinical  observations.  Premature Infant recommended reference ranges:  Up to 24 hours.............<8.0 mg/dL  Up to 48 hours............<12.0 mg/dL  3-5 days..................<15.0 mg/dL  6-29 days.................<15.0 mg/dL        BNP   16  Comment:  Values of less than 100 pg/ml are consistent with non-CHF populations.      BUN, Bld   9      Calcium   9.1      Chloride   105      CO2   26      Color, UA  Straw       Creatinine   0.9      Differential Method    Automated     eGFR if    >60.0      eGFR if non    >60.0  Comment:  Calculation used to obtain the estimated glomerular filtration  rate (eGFR) is the CKD-EPI equation. Since race is unknown   in our information system, the eGFR values for   -American and Non--American patients are given   for each creatinine result.        Eos #    0.2     Eosinophil%    2.1     Glucose   148(H)      Glucose, UA  Negative       Gran #    4.8     Gran%    43.7     Hematocrit    33.4(L)     Hemoglobin    11.5(L)     Hypo    Occasional     Ketones, UA  Negative       Leukocytes, UA  Negative       Lymph #    4.8     Lymph%    42.5     MCH    24.9(L)     MCHC    34.4     MCV    72(L)     Mono #    1.3(H)     Mono%    11.4     MPV    8.6(L)     Nitrite, UA  Negative       Occult Blood UA  Negative       pH, UA  6.0       Platelet Estimate    Appears normal     Platelets    173     Poik    Slight     Poly    Occasional     Potassium   3.4(L)      Preg Test, Ur Negative        Total Protein   7.8      Protein, UA  Negative  Comment:  Recommend a 24 hour urine protein or a urine    protein/creatinine ratio if globulin induced proteinuria is  clinically suspected.          Acceptable Yes        RBC    4.62     RDW    16.5(H)     Retic    4.7(H)     Sodium   140      Specific [place], UA  1.010       Specimen UA  Urine, Clean Catch       Tear Drop Cells    Occasional     Troponin I   <0.006  Comment:  The reference interval for Troponin I represents the 99th percentile   cutoff   for our facility and is consistent with 3rd generation assay   performance.        Urobilinogen, UA  Negative       WBC    11.19           Significant Imaging: CXR: I have reviewed all pertinent results/findings within the past 24 hours and my personal findings are:  prominent interstitial markings but no signs of acute cardiopulmonary process

## 2017-09-29 NOTE — HPI
Ms. Malone is a 37 year old female with a PMHx of sickle cell, beta thalassemia, asthma, HTN and trigeminal neuralgia who presents today with an acute pain crisis secondary to sickle cell/beta thalassemia which may have been triggered by recent asthma exacerbation. Her pain crisis began yesterday morning at which point she called her MD for a percocet rx. She notes that the pain began in her left shoulder with left neck and left arm pain and weakness, it is a 10/10, deep pain and has been constant since it began. She notes that she has been having more crises since she moved back to [place] recently and has been having them monthly in association with her menses. She was able to fill the prescription at 5PM yesterday and began taken 2-3 (Percocet 10mg tablets) every 4 hours from 6 pm until 6am this morning. The pain relief was not sufficient and barely took the edge off. She notes that this is similar to her recurrent crises. She did not feel that there was anything which made her pain worse although she does believe that her recent asthma exacerbation which she was seen in the [facility] ED on 9/25 may have led to this episode as well. In the ED on 9/25 she was given a Duo-Neb treatment, a dose of prednisone (60mg) and then discharged on Prednisone 40mg BID x 5 days. In her history of this asthma episode she noted that her symptoms began around 9/17 and she took some left over prednisone 40mg x 4 days until ~ 9/21. Up to presentation on 9/25 she was having increasing difficulty managing with her home nebulizer and rescue inhaler. Her last hospitalization at [facility] occurred in April 2017 and she was found to have Acute Chest Syndrome with multifocal PNA.

## 2017-09-29 NOTE — ASSESSMENT & PLAN NOTE
Hb 11.5 with Retic 4.6  Continue continuous hydration at 100ml hr D5 1/2NS  Scheduled 2mg Dilautid with 25mg Benadryl IV Q4H  Scheduled Ketorolac 30mg Q8H for 2 days considering normal renal function  Daily CBC, CMP considering significant tylenol ingestion  Re-check retic count 9/30 as add on due to timing

## 2017-09-29 NOTE — ED TRIAGE NOTES
Sickle cell pain that started across her chest and left arm yesterday around 2pm.    GENERAL: The patient is well-developed and well-nourished in no apparent distress. Alert and oriented x4.                                                HEENT: Head is normocephalic and atraumatic. Extraocular muscles are intact. Pupils are equal, round, and reactive to light and accommodation. Nares appeared normal. Mouth is well hydrated and without lesions. Mucous membranes are moist. Posterior pharynx clear of any exudate or lesions.    NECK: Supple. No carotid bruits. No lymphadenopathy or thyromegaly.    LUNGS: Clear to auscultation.    HEART: Regular rate and rhythm without murmur.     ABDOMEN: Soft, nontender, and nondistended. Positive bowel sounds. No hepatosplenomegaly was noted.     EXTREMITIES: Without any cyanosis, clubbing, rash, lesions or edema.     NEUROLOGIC: Cranial nerves II through XII are grossly intact.     PSYCHIATRIC: Flat affect, but denies suicidal or homicidal ideations.    SKIN: No ulceration or induration present.

## 2017-09-29 NOTE — MEDICAL/APP STUDENT
&[facility] Medicine Team 2     Admit date: 2017     SUBJECTIVE     CC: Sickle cell pain crisis (onset yesterday- having pain in left arm and neck)     HPI: 36 y/o female with PMH of HTN, sickle cell beta thalassemia, asthma, HTN and Trigeminal Neuralgia presented to ED with with complaints of 10/10 pain. Pain involved the left side of her neck, shoulder, arm and hand. Patient also endorsed chest pain and shortness of breath. Patient states symptoms are consistent with sickle cell pain crisis that happens about once a month. Patient had a recent bout of asthma exacerbation that she believes precipitated her pain crisis. Patient also said she had a minor bout nausea and has noticed some blurry vision. Pain started 1 day ago and patient has percocet that she says normally helps but this time the pain didn't subside. Hot compresses also sometimes help alleviated the pain but patient said not when it is this severe. Patient is usually able to treat her pain crises at home but this time the pain was too severe.    Patient denies fever, chills, abdominal pain, changes in urination, changes in bowel function, syncope, headache    Past Medical History:   Diagnosis Date    Asthma     Hypertension     Sickle cell-beta thalassemia disease with pain     Trigeminal neuralgia        Past Surgical History:   Procedure Laterality Date     SECTION      TONSILLECTOMY      TUBAL LIGATION         No family history on file.    Social History   Substance Use Topics    Smoking status: Never Smoker    Smokeless tobacco: Never Used    Alcohol use No        Scheduled Medications:   [START ON 2017] amlodipine  10 mg Oral Daily    baclofen  10 mg Oral BID    carbamazepine  400 mg Oral BID    carvedilol  25 mg Oral Once    [START ON 2017] carvedilol  25 mg Oral BID    enoxparin  40 mg Subcutaneous Q12H    ergocalciferol  50,000 Units Oral Q7 Days    fluoxetine  40 mg Oral Daily    gabapentin  300 mg  "Oral BID    hydrALAZINE  100 mg Oral Q8H    HYDROmorphone  2 mg Intravenous Q4H    hydroxyurea  500 mg Oral Daily    senna-docusate 8.6-50 mg  1 tablet Oral BID    sodium chloride 0.9%  3 mL Intravenous Q8H        PRN Medications: albuterol, albuterol-ipratropium 2.5mg-0.5mg/3mL, ondansetron, [START ON 10/1/2017] oxycodone     Allergies:   Review of patient's allergies indicates:   Allergen Reactions    Morphine Itching        Review of Systems   Constitutional: Positive for appetite change. Negative for chills, fatigue and fever.   HENT: Negative for congestion, ear pain, hearing loss, postnasal drip, rhinorrhea, sinus pressure, sore throat and tinnitus.   Eyes: Negative for redness, itching. Positive for blurriness.  Respiratory: Positive for cough, shortness of breath   Cardiovascular: Positive for chest pain, negative for palpitations   Gastrointestinal: Negative for abdominal pain, constipation, diarrhea and vomiting. Positive for nausea.   Genitourinary: Negative for decreased urine volume, difficulty urinating, dysuria, frequency, hematuria and urgency.   Musculoskeletal: Positive for neck and left upper extremity pain.   Skin: Negative for rash.   Neurological: Negative for dizziness, light-headedness and headaches.   Psychiatric/Behavioral: Negative for agitation, confusion, AVH. Appropriate affect.       OBJECTIVE     Vital Signs  BP (!) 167/94   Pulse 77   Temp 98.2 °F (36.8 °C)   Resp 20   Ht 5' 2" (1.575 m)   Wt 136.1 kg (300 lb)   LMP 09/04/2017   SpO2 98%   BMI 54.87 kg/m²      Physical Exam   Constitutional: Pt is oriented to person, place, and time. Appears well-developed and well-nourished. Visible distress due to pain  Head: Normocephalic and atraumatic.   Mouth/Throat: Oropharynx is clear and moist. No cyanosis.  Eyes: Pupils are equal, round, and reactive to light. EOM normal.  Neck: Neck supple. Normal ROM. No swelling.  Cardiovascular: RRR, no MRG, intact distal pulses. "   Pulmonary/Chest: Normal respiratory effort. CTAB.  Abdominal: Soft. There is no tenderness.  Bowel sounds normal.  Musculoskeletal: Normal range of motion. No joint stiffness or pain.  Neurological: AOx3. No focal neurologic deficits noted.  Skin: Skin is warm and dry. No trauma noted.    Pertinent Labs:   Retic 4.7  H/H 11.5/33.4    Pertinent Studies:   CXRAY- negative  Troponin and BNP- WNL       ASSESSMENT/PLAN     36 y/o F with PMH of sickle cell, asthma, HTN, trigeminal neuralgia who presents for sickle cell pain crisis    Problem 1  Sickle cell pain crisis  Ddx: sickle cell pain crisis, ACS, pneumonia, bronchitis.   CXRAY negative, EKG negative. Elevated retic count (4.7)  Administer IVF  Dilaudid 2mg q4 for pain     Problem 2  Asthma exacerbation  Monitor 02 sats      Problem 3  HTN  Administer home norvasc 10mg and coreg 25mg       Diet: Regular  DVT prophylaxis:   Code: Full  Dispo:         Robb Jaimes, MS3  Hospital Medicine Team 2

## 2017-09-29 NOTE — HOSPITAL COURSE
9/29/2017 ED course - seen by ED JAI/MD and given 1L bolus, Norvasc 10, Hydralazine 100, Benadryl 25, Dilautid 1mg x 3 (last dose 1005) and started on D5 1/2NS infusion  CXR with chronic prominent interstitial markings    9/30 - pain well controlled, titrated Hydromorphone requirements to 1mg Q4 with .5mg PRN Q8 for severe and Oxy IR 10mg Q8 for moderate. Oxygen saturations stable, interval CXR without change, H/H stable    10/01 - patient complaining of pain but not utilizing all PRN medications - no change to pain medication ATT. Oxygen saturations stable, uses N/C for comfort. H/H stable    10/2 assess for pain severity and prime up for discharge  10/3 off all IV medications - will assess O2 sats with ambulation, O2 sats stable during ambulation

## 2017-09-29 NOTE — ED NOTES
"Pt requesting to speak with NP and and speaking on phone stating "the dr telling me all my labs look good but they dont the pain I feel in my body"    "

## 2017-09-29 NOTE — H&P
[facility] Hospital Medicine  History & Physical    Patient Name: Nazanin Malone  MRN: [id number]  Admission Date: 9/29/2017  Attending Physician: Neel Rahman, *   Primary Care Provider: [facility] Medicine Team: [facility] [place] 2 Balta Forbes MD     Patient information was obtained from patient and ER records.     Subjective:     Principal Problem:Sickle cell pain crisis    Chief Complaint:   Chief Complaint   Patient presents with    Sickle Cell Pain Crisis     onset yesterday - having pain in left arm and neck.        HPI: Ms. Malone is a 37 year old female with a PMHx of sickle cell, beta thalassemia, asthma, HTN and trigeminal neuralgia who presents today with an acute pain crisis secondary to sickle cell/beta thalassemia which may have been triggered by recent asthma exacerbation. Her pain crisis began yesterday morning at which point she called her MD for a percocet rx. She notes that the pain began in her left shoulder with left neck and left arm pain and weakness, it is a 10/10, deep pain and has been constant since it began. She notes that she has been having more crises since she moved back to [place] recently and has been having them monthly in association with her menses. She was able to fill the prescription at 5PM yesterday and began taken 2-3 (Percocet 10mg tablets) every 4 hours from 6 pm until 6am this morning. The pain relief was not sufficient and barely took the edge off. She notes that this is similar to her recurrent crises. She did not feel that there was anything which made her pain worse although she does believe that her recent asthma exacerbation which she was [place] [facility] [place] may have led to this episode as well. In the ED on 9/25 she was given a Duo-Neb treatment, a dose of prednisone (60mg) and then discharged on Prednisone 40mg BID x 5 days. In her history of this asthma episode she noted that her symptoms began  around  and she took some left over prednisone 40mg x 4 days until ~ . Up to presentation on  she was having increasing difficulty managing with her home nebulizer and rescue inhaler. Her last hospitalization at [facility] occurred in 2017 and she was found to have Acute Chest Syndrome with multifocal PNA.     Past Medical History:   Diagnosis Date    Asthma     Hypertension     Sickle cell-beta thalassemia disease with pain     Trigeminal neuralgia        Past Surgical History:   Procedure Laterality Date     SECTION      TONSILLECTOMY      TUBAL LIGATION         Review of patient's allergies indicates:   Allergen Reactions    Morphine Itching       No current facility-administered medications on file prior to encounter.      Current Outpatient Prescriptions on File Prior to Encounter   Medication Sig    albuterol (ACCUNEB) 0.63 mg/3 mL Nebu Take 0.63 mg by nebulization every 6 (six) hours as needed. Rescue    albuterol (VENTOLIN HFA) 90 mcg/actuation inhaler Inhale 2 puffs into the lungs every 6 (six) hours as needed for Wheezing. Rescue    amlodipine (NORVASC) 10 MG tablet Take 1 tablet (10 mg total) by mouth once daily.    baclofen (LIORESAL) 10 MG tablet Take 1 tablet (10 mg total) by mouth 2 (two) times daily.    budesonide-formoterol 160-4.5 mcg (SYMBICORT) 160-4.5 mcg/actuation HFAA Inhale 2 puffs into the lungs every 12 (twelve) hours. Controller    carbamazepine (TEGRETOL) 200 mg tablet Take 2 tablets (400 mg total) by mouth 2 (two) times daily.    carvedilol (COREG) 25 MG tablet Take 1 tablet (25 mg total) by mouth 2 (two) times daily.    ergocalciferol (VITAMIN D2) 50,000 unit Cap Take 1 capsule (50,000 Units total) by mouth every 7 days.    fluoxetine (PROZAC) 40 MG capsule Take 40 mg by mouth once daily.    gabapentin (NEURONTIN) 300 MG capsule Take 1 capsule (300 mg total) by mouth 2 (two) times daily.    hydrALAZINE (APRESOLINE) 100 MG tablet Take 1 tablet  (100 mg total) by mouth every 8 (eight) hours.    hydrocortisone 1 % cream Apply to affected area 2 times daily    hydroxyurea (HYDREA) 500 mg Cap Take 1 capsule (500 mg total) by mouth once daily.    ondansetron (ZOFRAN-ODT) 8 MG TbDL Take 1 tablet (8 mg total) by mouth every 6 (six) hours as needed.    oxycodone-acetaminophen (PERCOCET)  mg per tablet Take 1 tablet by mouth every 4 (four) hours as needed for Pain.    predniSONE (DELTASONE) 20 MG tablet Take 2 tablets (40 mg total) by mouth once daily.    senna-docusate 8.6-50 mg (PERICOLACE) 8.6-50 mg per tablet Take 1 tablet by mouth 2 (two) times daily.     Family History     None        Social History Main Topics    Smoking status: Never Smoker    Smokeless tobacco: Never Used    Alcohol use No    Drug use: No    Sexual activity: No     Review of Systems   Constitutional: Positive for activity change, appetite change and fatigue. Negative for chills and fever.   HENT: Negative for nosebleeds, postnasal drip, rhinorrhea, sinus pain and sore throat.    Eyes: Negative for visual disturbance.   Respiratory: Positive for cough, chest tightness, shortness of breath and wheezing.    Cardiovascular: Positive for leg swelling. Negative for chest pain and palpitations.   Gastrointestinal: Negative for abdominal distention, abdominal pain, constipation, diarrhea and vomiting.   Endocrine: Negative for polyphagia and polyuria.   Genitourinary: Negative for difficulty urinating, dysuria and hematuria.   Musculoskeletal: Positive for arthralgias, back pain, myalgias and neck pain.   Neurological: Positive for weakness. Negative for seizures and headaches.   Hematological: Negative for adenopathy. Does not bruise/bleed easily.   Psychiatric/Behavioral: The patient is not nervous/anxious.      Objective:     Vital Signs (Most Recent):  Temp: 98.2 °F (36.8 °C) (09/29/17 1247)  Pulse: 77 (09/29/17 1247)  Resp: 20 (09/29/17 1247)  BP: (!) 167/94 (09/29/17  1247)  SpO2: 98 % (09/29/17 1247) Vital Signs (24h Range):  Temp:  [98.2 °F (36.8 °C)-98.5 °F (36.9 °C)] 98.2 °F (36.8 °C)  Pulse:  [77-96] 77  Resp:  [18-20] 20  SpO2:  [95 %-98 %] 98 %  BP: (167-203)/() 167/94     Weight: 136.1 kg (300 lb)  Body mass index is 54.87 kg/m².    Physical Exam   Constitutional: She is oriented to person, place, and time. She appears well-developed and well-nourished. She appears distressed.   Morbidly obese   HENT:   Head: Normocephalic and atraumatic.   Right Ear: External ear normal.   Left Ear: External ear normal.   Mouth/Throat: Oropharynx is clear and moist. Mucous membranes are dry. No oral lesions. No oropharyngeal exudate.   Eyes: Conjunctivae and EOM are normal. Pupils are equal, round, and reactive to light. Right eye exhibits no discharge. No scleral icterus.   Neck: Normal range of motion. Muscular tenderness present.   Cardiovascular: Normal rate, regular rhythm, S1 normal, S2 normal, normal heart sounds and intact distal pulses.  Exam reveals no gallop and no friction rub.    No murmur heard.  Pulses:       Radial pulses are 2+ on the right side, and 2+ on the left side.        Dorsalis pedis pulses are 2+ on the right side, and 2+ on the left side.   Pulmonary/Chest: Effort normal. No stridor. She has wheezes. She exhibits no tenderness.   Abdominal: Soft. Bowel sounds are normal. She exhibits no mass. There is no tenderness. There is no guarding. No hernia.   Musculoskeletal: Normal range of motion. She exhibits tenderness. She exhibits no edema.        Left shoulder: She exhibits tenderness, bony tenderness and pain. She exhibits no effusion and no crepitus.        Left elbow: Tenderness found.        Left wrist: She exhibits tenderness and bony tenderness.   Lymphadenopathy:     She has no cervical adenopathy.   Neurological: She is alert and oriented to person, place, and time. She displays no tremor. No cranial nerve deficit or sensory deficit. GCS eye  subscore is 4. GCS verbal subscore is 5. GCS motor subscore is 6.   Skin: Skin is warm, dry and intact. No rash noted. She is not diaphoretic.   Psychiatric: She has a normal mood and affect.        Significant Labs:   Recent Lab Results       09/29/17  1011 09/29/17  1000 09/29/17  0739 09/29/17  0737      Albumin   3.7      Alkaline Phosphatase   98      ALT   12      Anion Gap   9      Aniso    Slight     Appearance, UA  Clear       AST   14      Baso #    0.03     Basophil%    0.3     Bilirubin (UA)  Negative       Total Bilirubin   0.6  Comment:  For infants and newborns, interpretation of results should be based  on gestational age, weight and in agreement with clinical  observations.  Premature Infant recommended reference ranges:  Up to 24 hours.............<8.0 mg/dL  Up to 48 hours............<12.0 mg/dL  3-5 days..................<15.0 mg/dL  6-29 days.................<15.0 mg/dL        BNP   16  Comment:  Values of less than 100 pg/ml are consistent with non-CHF populations.      BUN, Bld   9      Calcium   9.1      Chloride   105      CO2   26      Color, UA  Straw       Creatinine   0.9      Differential Method    Automated     eGFR if    >60.0      eGFR if non    >60.0  Comment:  Calculation used to obtain the estimated glomerular filtration  rate (eGFR) is the CKD-EPI equation. Since race is unknown   in our information system, the eGFR values for   -American and Non--American patients are given   for each creatinine result.        Eos #    0.2     Eosinophil%    2.1     Glucose   148(H)      Glucose, UA  Negative       Gran #    4.8     Gran%    43.7     Hematocrit    33.4(L)     Hemoglobin    11.5(L)     Hypo    Occasional     Ketones, UA  Negative       Leukocytes, UA  Negative       Lymph #    4.8     Lymph%    42.5     MCH    24.9(L)     MCHC    34.4     MCV    72(L)     Mono #    1.3(H)     Mono%    11.4     MPV    8.6(L)     Nitrite, UA  Negative        Occult Blood UA  Negative       pH, UA  6.0       Platelet Estimate    Appears normal     Platelets    173     Poik    Slight     Poly    Occasional     Potassium   3.4(L)      Preg Test, Ur Negative        Total Protein   7.8      Protein, UA  Negative  Comment:  Recommend a 24 hour urine protein or a urine   protein/creatinine ratio if globulin induced proteinuria is  clinically suspected.          Acceptable Yes        RBC    4.62     RDW    16.5(H)     Retic    4.7(H)     Sodium   140      Specific [place], UA  1.010       Specimen UA  Urine, Clean Catch       Tear Drop Cells    Occasional     Troponin I   <0.006  Comment:  The reference interval for Troponin I represents the 99th percentile   cutoff   for our facility and is consistent with 3rd generation assay   performance.        Urobilinogen, UA  Negative       WBC    11.19           Significant Imaging: CXR: I have reviewed all pertinent results/findings within the past 24 hours and my personal findings are:  prominent interstitial markings but no signs of acute cardiopulmonary process    Assessment/Plan:     * Sickle cell pain crisis    Hb 11.5 with Retic 4.6  Continue continuous hydration at 100ml hr D5 1/2NS  Scheduled 2mg Dilautid with 25mg Benadryl IV Q4H  Scheduled Ketorolac 30mg Q8H for 2 days considering normal renal function  Daily CBC, CMP considering significant tylenol ingestion  Re-check retic count 9/30 as add on due to timing              Asthma exacerbation    On 2L N/C  Has written for Q4H Duo-Neb and PRN Q6H rescue inhaler            Benign essential HTN    Continue home dosage of:  Amlodipine  Hydralazine  Re-start Coreg 25mg BID              VTE Risk Mitigation         Ordered     enoxaparin injection 40 mg  Every 12 hours (non-standard times)     Route:  Subcutaneous        09/29/17 1224     Medium Risk of VTE  Once      09/29/17 1224     Place JOSE LUIS hose  Until discontinued      09/29/17 1224     Place sequential  compression device  Until discontinued      09/29/17 1224             Balta Forbes MD PGY-1  Department of [facility] Medicine   [facility]

## 2017-09-29 NOTE — TELEPHONE ENCOUNTER
----- Message from Rachel Maravilla sent at 9/29/2017  9:59 AM CDT -----  Contact: Pt  Pt calling crying stating that she needs immediate attention for the amount of pain that she is in. Pt is in ER right now and they are trying to send her home    Pt call back number [phone]  Spoke with pt, stated she has received Dilaudid 3mg and is currently waiting to be admitted.   Dayna

## 2017-09-30 LAB
ANION GAP SERPL CALC-SCNC: 9 MMOL/L
ANISOCYTOSIS BLD QL SMEAR: SLIGHT
BASOPHILS NFR BLD: 0 %
BUN SERPL-MCNC: 9 MG/DL
CALCIUM SERPL-MCNC: 8.7 MG/DL
CHLORIDE SERPL-SCNC: 103 MMOL/L
CO2 SERPL-SCNC: 25 MMOL/L
CREAT SERPL-MCNC: 0.9 MG/DL
DACRYOCYTES BLD QL SMEAR: ABNORMAL
DIFFERENTIAL METHOD: ABNORMAL
EOSINOPHIL NFR BLD: 10 %
ERYTHROCYTE [DISTWIDTH] IN BLOOD BY AUTOMATED COUNT: 16.4 %
EST. GFR  (AFRICAN AMERICAN): >60 ML/MIN/1.73 M^2
EST. GFR  (NON AFRICAN AMERICAN): >60 ML/MIN/1.73 M^2
GLUCOSE SERPL-MCNC: 133 MG/DL
HCT VFR BLD AUTO: 34.9 %
HGB BLD-MCNC: 11.5 G/DL
HYPOCHROMIA BLD QL SMEAR: ABNORMAL
LYMPHOCYTES NFR BLD: 41 %
MAGNESIUM SERPL-MCNC: 1.7 MG/DL
MCH RBC QN AUTO: 24.4 PG
MCHC RBC AUTO-ENTMCNC: 33 G/DL
MCV RBC AUTO: 74 FL
MONOCYTES NFR BLD: 7 %
NEUTROPHILS NFR BLD: 42 %
NRBC BLD-RTO: ABNORMAL /100 WBC
PHOSPHATE SERPL-MCNC: 4 MG/DL
PLATELET # BLD AUTO: 184 K/UL
PLATELET BLD QL SMEAR: ABNORMAL
PMV BLD AUTO: 8.8 FL
POIKILOCYTOSIS BLD QL SMEAR: SLIGHT
POLYCHROMASIA BLD QL SMEAR: ABNORMAL
POTASSIUM SERPL-SCNC: 3.7 MMOL/L
RBC # BLD AUTO: 4.71 M/UL
SODIUM SERPL-SCNC: 137 MMOL/L
WBC # BLD AUTO: 10.37 K/UL

## 2017-09-30 PROCEDURE — S5010 5% DEXTROSE AND 0.45% SALINE: HCPCS | Performed by: STUDENT IN AN ORGANIZED HEALTH CARE EDUCATION/TRAINING PROGRAM

## 2017-09-30 PROCEDURE — 25000242 PHARM REV CODE 250 ALT 637 W/ HCPCS: Performed by: STUDENT IN AN ORGANIZED HEALTH CARE EDUCATION/TRAINING PROGRAM

## 2017-09-30 PROCEDURE — 80048 BASIC METABOLIC PNL TOTAL CA: CPT

## 2017-09-30 PROCEDURE — 94761 N-INVAS EAR/PLS OXIMETRY MLT: CPT

## 2017-09-30 PROCEDURE — 94760 N-INVAS EAR/PLS OXIMETRY 1: CPT

## 2017-09-30 PROCEDURE — 11000001 HC ACUTE MED/SURG PRIVATE ROOM

## 2017-09-30 PROCEDURE — 36415 COLL VENOUS BLD VENIPUNCTURE: CPT

## 2017-09-30 PROCEDURE — 84100 ASSAY OF PHOSPHORUS: CPT

## 2017-09-30 PROCEDURE — 85025 COMPLETE CBC W/AUTO DIFF WBC: CPT

## 2017-09-30 PROCEDURE — 83735 ASSAY OF MAGNESIUM: CPT

## 2017-09-30 PROCEDURE — 94640 AIRWAY INHALATION TREATMENT: CPT

## 2017-09-30 PROCEDURE — A4216 STERILE WATER/SALINE, 10 ML: HCPCS | Performed by: STUDENT IN AN ORGANIZED HEALTH CARE EDUCATION/TRAINING PROGRAM

## 2017-09-30 PROCEDURE — 25000003 PHARM REV CODE 250: Performed by: STUDENT IN AN ORGANIZED HEALTH CARE EDUCATION/TRAINING PROGRAM

## 2017-09-30 PROCEDURE — 63600175 PHARM REV CODE 636 W HCPCS: Performed by: STUDENT IN AN ORGANIZED HEALTH CARE EDUCATION/TRAINING PROGRAM

## 2017-09-30 PROCEDURE — 27000221 HC OXYGEN, UP TO 24 HOURS

## 2017-09-30 RX ORDER — HYDROMORPHONE HYDROCHLORIDE 1 MG/ML
0.5 INJECTION, SOLUTION INTRAMUSCULAR; INTRAVENOUS; SUBCUTANEOUS EVERY 8 HOURS PRN
Status: DISCONTINUED | OUTPATIENT
Start: 2017-09-30 | End: 2017-09-30

## 2017-09-30 RX ORDER — HYDROMORPHONE HYDROCHLORIDE 1 MG/ML
0.5 INJECTION, SOLUTION INTRAMUSCULAR; INTRAVENOUS; SUBCUTANEOUS ONCE
Status: COMPLETED | OUTPATIENT
Start: 2017-09-30 | End: 2017-09-30

## 2017-09-30 RX ORDER — HYDROMORPHONE HYDROCHLORIDE 1 MG/ML
1 INJECTION, SOLUTION INTRAMUSCULAR; INTRAVENOUS; SUBCUTANEOUS EVERY 6 HOURS PRN
Status: DISCONTINUED | OUTPATIENT
Start: 2017-09-30 | End: 2017-10-03

## 2017-09-30 RX ORDER — HYDROMORPHONE HYDROCHLORIDE 1 MG/ML
1 INJECTION, SOLUTION INTRAMUSCULAR; INTRAVENOUS; SUBCUTANEOUS EVERY 4 HOURS
Status: DISCONTINUED | OUTPATIENT
Start: 2017-09-30 | End: 2017-10-02

## 2017-09-30 RX ORDER — HYDROMORPHONE HYDROCHLORIDE 1 MG/ML
1 INJECTION, SOLUTION INTRAMUSCULAR; INTRAVENOUS; SUBCUTANEOUS EVERY 4 HOURS
Status: DISCONTINUED | OUTPATIENT
Start: 2017-09-30 | End: 2017-09-30

## 2017-09-30 RX ORDER — OXYCODONE HYDROCHLORIDE 5 MG/1
10 TABLET ORAL EVERY 8 HOURS PRN
Status: DISCONTINUED | OUTPATIENT
Start: 2017-09-30 | End: 2017-10-02

## 2017-09-30 RX ADMIN — IPRATROPIUM BROMIDE AND ALBUTEROL SULFATE 3 ML: .5; 3 SOLUTION RESPIRATORY (INHALATION) at 08:09

## 2017-09-30 RX ADMIN — FLUOXETINE 40 MG: 20 CAPSULE ORAL at 10:09

## 2017-09-30 RX ADMIN — BACLOFEN 10 MG: 10 TABLET ORAL at 08:09

## 2017-09-30 RX ADMIN — ENOXAPARIN SODIUM 40 MG: 100 INJECTION SUBCUTANEOUS at 02:09

## 2017-09-30 RX ADMIN — STANDARDIZED SENNA CONCENTRATE AND DOCUSATE SODIUM 1 TABLET: 8.6; 5 TABLET, FILM COATED ORAL at 08:09

## 2017-09-30 RX ADMIN — Medication 3 ML: at 02:09

## 2017-09-30 RX ADMIN — GABAPENTIN 300 MG: 300 CAPSULE ORAL at 08:09

## 2017-09-30 RX ADMIN — DIPHENHYDRAMINE HYDROCHLORIDE 25 MG: 50 INJECTION, SOLUTION INTRAMUSCULAR; INTRAVENOUS at 10:09

## 2017-09-30 RX ADMIN — DIPHENHYDRAMINE HYDROCHLORIDE 25 MG: 50 INJECTION, SOLUTION INTRAMUSCULAR; INTRAVENOUS at 06:09

## 2017-09-30 RX ADMIN — HYDROXYUREA 500 MG: 500 CAPSULE ORAL at 10:09

## 2017-09-30 RX ADMIN — HYDRALAZINE HYDROCHLORIDE 100 MG: 50 TABLET ORAL at 06:09

## 2017-09-30 RX ADMIN — IPRATROPIUM BROMIDE AND ALBUTEROL SULFATE 3 ML: .5; 3 SOLUTION RESPIRATORY (INHALATION) at 11:09

## 2017-09-30 RX ADMIN — IPRATROPIUM BROMIDE AND ALBUTEROL SULFATE 3 ML: .5; 3 SOLUTION RESPIRATORY (INHALATION) at 03:09

## 2017-09-30 RX ADMIN — HYDROMORPHONE HYDROCHLORIDE 0.5 MG: 1 INJECTION, SOLUTION INTRAMUSCULAR; INTRAVENOUS; SUBCUTANEOUS at 04:09

## 2017-09-30 RX ADMIN — KETOROLAC TROMETHAMINE 30 MG: 15 INJECTION, SOLUTION INTRAMUSCULAR; INTRAVENOUS at 01:09

## 2017-09-30 RX ADMIN — OXYCODONE HYDROCHLORIDE 10 MG: 5 TABLET ORAL at 02:09

## 2017-09-30 RX ADMIN — HYDROMORPHONE HYDROCHLORIDE 1 MG: 1 INJECTION, SOLUTION INTRAMUSCULAR; INTRAVENOUS; SUBCUTANEOUS at 09:09

## 2017-09-30 RX ADMIN — CARBAMAZEPINE 400 MG: 200 TABLET ORAL at 08:09

## 2017-09-30 RX ADMIN — AMLODIPINE BESYLATE 10 MG: 10 TABLET ORAL at 10:09

## 2017-09-30 RX ADMIN — DIPHENHYDRAMINE HYDROCHLORIDE 25 MG: 50 INJECTION, SOLUTION INTRAMUSCULAR; INTRAVENOUS at 02:09

## 2017-09-30 RX ADMIN — KETOROLAC TROMETHAMINE 30 MG: 15 INJECTION, SOLUTION INTRAMUSCULAR; INTRAVENOUS at 06:09

## 2017-09-30 RX ADMIN — HYDROMORPHONE HYDROCHLORIDE 2 MG: 1 INJECTION, SOLUTION INTRAMUSCULAR; INTRAVENOUS; SUBCUTANEOUS at 10:09

## 2017-09-30 RX ADMIN — DEXTROSE MONOHYDRATE AND SODIUM CHLORIDE: 5; .45 INJECTION, SOLUTION INTRAVENOUS at 06:09

## 2017-09-30 RX ADMIN — HYDROMORPHONE HYDROCHLORIDE 2 MG: 1 INJECTION, SOLUTION INTRAMUSCULAR; INTRAVENOUS; SUBCUTANEOUS at 06:09

## 2017-09-30 RX ADMIN — STANDARDIZED SENNA CONCENTRATE AND DOCUSATE SODIUM 1 TABLET: 8.6; 5 TABLET, FILM COATED ORAL at 10:09

## 2017-09-30 RX ADMIN — CARBAMAZEPINE 400 MG: 200 TABLET ORAL at 10:09

## 2017-09-30 RX ADMIN — HYDROMORPHONE HYDROCHLORIDE 2 MG: 1 INJECTION, SOLUTION INTRAMUSCULAR; INTRAVENOUS; SUBCUTANEOUS at 02:09

## 2017-09-30 RX ADMIN — HYDROMORPHONE HYDROCHLORIDE 1 MG: 1 INJECTION, SOLUTION INTRAMUSCULAR; INTRAVENOUS; SUBCUTANEOUS at 08:09

## 2017-09-30 RX ADMIN — DEXTROSE MONOHYDRATE AND SODIUM CHLORIDE: 5; .45 INJECTION, SOLUTION INTRAVENOUS at 05:09

## 2017-09-30 RX ADMIN — BACLOFEN 10 MG: 10 TABLET ORAL at 10:09

## 2017-09-30 RX ADMIN — HYDRALAZINE HYDROCHLORIDE 100 MG: 50 TABLET ORAL at 02:09

## 2017-09-30 RX ADMIN — GABAPENTIN 300 MG: 300 CAPSULE ORAL at 10:09

## 2017-09-30 RX ADMIN — HYDROMORPHONE HYDROCHLORIDE 1 MG: 1 INJECTION, SOLUTION INTRAMUSCULAR; INTRAVENOUS; SUBCUTANEOUS at 02:09

## 2017-09-30 RX ADMIN — ENOXAPARIN SODIUM 40 MG: 100 INJECTION SUBCUTANEOUS at 01:09

## 2017-09-30 RX ADMIN — DIPHENHYDRAMINE HYDROCHLORIDE 25 MG: 50 INJECTION, SOLUTION INTRAMUSCULAR; INTRAVENOUS at 08:09

## 2017-09-30 NOTE — PLAN OF CARE
Problem: Patient Care Overview  Goal: Plan of Care Review  Outcome: Ongoing (interventions implemented as appropriate)  Vital signs stable on O2 at 2 lpm. Needs in reach. Bed remains in lowest position.     Problem: Fall Risk (Adult)  Goal: Identify Related Risk Factors and Signs and Symptoms  Related risk factors and signs and symptoms are identified upon initiation of Human Response Clinical Practice Guideline (CPG)   Outcome: Ongoing (interventions implemented as appropriate)  Instructed patient to please call for assistance whenever needed. Fall precautions implemented. Needs in reach. Room near station. Monitoring closely.    Problem: Pain, Acute (Adult)  Goal: Identify Related Risk Factors and Signs and Symptoms  Related risk factors and signs and symptoms are identified upon initiation of Human Response Clinical Practice Guideline (CPG)  Outcome: Ongoing (interventions implemented as appropriate)  Patient receiving scheduled dilaudid. Reports pain relief whenever administered. Pain location is in left arm. Warm compresses provided. IV fluids infusing as ordered.

## 2017-09-30 NOTE — PROGRESS NOTES
[facility] Hospital Medicine  Progress Note    Patient Name: Nazanin Malone  MRN: [id number]  Patient Class: IP- Inpatient   Admission Date: 9/29/2017  Length of Stay: 1 days  Attending Physician: Lorin Clark MD  Primary Care Provider: [facility] Medicine Team: [facility] [place] [facility] 2 Balta Forbes MD    Subjective:     Principal Problem:Sickle cell pain crisis    HPI:  Ms. Malone is a 37 year old female with a PMHx of sickle cell, beta thalassemia, asthma, HTN and trigeminal neuralgia who presents today with an acute pain crisis secondary to sickle cell/beta thalassemia which may have been triggered by recent asthma exacerbation. Her pain crisis began yesterday morning at which point she called her MD for a percocet rx. She notes that the pain began in her left shoulder with left neck and left arm pain and weakness, it is a 10/10, deep pain and has been constant since it began. She notes that she has been having more crises since she moved back to [place] recently and has been having them monthly in association with her menses. She was able to fill the prescription at 5PM yesterday and began taken 2-3 (Percocet 10mg tablets) every 4 hours from 6 pm until 6am this morning. The pain relief was not sufficient and barely took the edge off. She notes that this is similar to her recurrent crises. She did not feel that there was anything which made her pain worse although she does believe that her recent asthma exacerbation which she was seen in the [facility] [place] may have led to this episode as well. In the ED on 9/25 she was given a Duo-Neb treatment, a dose of prednisone (60mg) and then discharged on Prednisone 40mg BID x 5 days. In her history of this asthma episode she noted that her symptoms began around 9/17 and she took some left over prednisone 40mg x 4 days until ~ 9/21. Up to presentation on 9/25 she was having increasing difficulty managing with her home nebulizer and  rescue inhaler. Her last hospitalization at [facility] occurred in April 2017 and she was found to have Acute Chest Syndrome with multifocal PNA.     Hospital Course:  9/29/2017 ED course - seen by ED JAI/MD and given 1L bolus, Norvasc 10, Hydralazine 100, Benadryl 25, Dilautid 1mg x 3 (last dose 1005) and started on D5 1/2NS infusion  CXR with chronic prominent interstitial markings    9/30 - pain well controlled, titrated Hydromorphone requirements to 1mg Q4 with .5mg PRN Q8 for severe and Oxy IR 10mg Q8 for moderate. Oxygen saturations stable, interval CXR without change, H/H stable    Interval History - NAEON, only required additional Hydromorphone at 4AM. L arm/neck pain improved now with central chest pain without any radiation, pressure or chest tightness. Interval CXR without any change.     Review of Systems   Constitutional: Positive for activity change and fatigue. Negative for appetite change, chills and fever.   HENT: Negative for nosebleeds, postnasal drip, rhinorrhea, sinus pain and sore throat.    Eyes: Negative for visual disturbance.   Respiratory: Positive for cough and shortness of breath. Negative for chest tightness and wheezing.    Cardiovascular: Positive for leg swelling. Negative for chest pain and palpitations.   Gastrointestinal: Negative for abdominal distention, abdominal pain, constipation, diarrhea and vomiting.   Endocrine: Negative for polyphagia and polyuria.   Genitourinary: Negative for difficulty urinating, dysuria and hematuria.   Musculoskeletal: Positive for arthralgias, back pain and myalgias. Negative for neck pain.   Neurological: Positive for weakness. Negative for seizures and headaches.   Hematological: Negative for adenopathy. Does not bruise/bleed easily.   Psychiatric/Behavioral: The patient is not nervous/anxious.      Objective:     Vital Signs (Most Recent):  Temp: 97.8 °F (36.6 °C) (09/30/17 1143)  Pulse: (!) 113 (09/30/17 1143)  Resp: 20 (09/30/17 1143)  BP: 132/78  (09/30/17 1143)  SpO2: (!) 93 % (09/30/17 1143) Vital Signs (24h Range):  Temp:  [97.2 °F (36.2 °C)-98.8 °F (37.1 °C)] 97.8 °F (36.6 °C)  Pulse:  [] 113  Resp:  [16-24] 20  SpO2:  [92 %-99 %] 93 %  BP: (131-185)/() 132/78     Weight: 136.1 kg (300 lb)  Body mass index is 54.87 kg/m².    Physical Exam   Constitutional: She is oriented to person, place, and time. She appears well-developed and well-nourished. No distress.   Morbidly obese   HENT:   Head: Normocephalic and atraumatic.   Right Ear: External ear normal.   Left Ear: External ear normal.   Mouth/Throat: Oropharynx is clear and moist. Mucous membranes are not dry. No oral lesions. No oropharyngeal exudate.   Eyes: Conjunctivae and EOM are normal. Pupils are equal, round, and reactive to light. Right eye exhibits no discharge. No scleral icterus.   Neck: Normal range of motion. Muscular tenderness present.   Cardiovascular: Normal rate, regular rhythm, S1 normal, S2 normal, normal heart sounds and intact distal pulses.  Exam reveals no gallop and no friction rub.    No murmur heard.  Pulses:       Radial pulses are 2+ on the right side, and 2+ on the left side.        Dorsalis pedis pulses are 2+ on the right side, and 2+ on the left side.   + 1 pitting edema to mid shin R>L   Pulmonary/Chest: Effort normal. No stridor. She has no wheezes. She exhibits no tenderness.   Abdominal: Soft. Bowel sounds are normal. She exhibits no mass. There is no tenderness. There is no guarding. No hernia.   Musculoskeletal: Normal range of motion. She exhibits no edema or tenderness.        Left shoulder: She exhibits no effusion and no crepitus.   Lymphadenopathy:     She has no cervical adenopathy.   Neurological: She is alert and oriented to person, place, and time. She displays no tremor. No cranial nerve deficit or sensory deficit. GCS eye subscore is 4. GCS verbal subscore is 5. GCS motor subscore is 6.   Skin: Skin is warm, dry and intact. No rash noted.  She is not diaphoretic.   Psychiatric: She has a normal mood and affect.        Significant Labs:   Recent Lab Results       09/30/17  0429      Anion Gap 9     Aniso Slight     Basophil% 0.0     BUN, Bld 9     Calcium 8.7     Chloride 103     CO2 25     Creatinine 0.9     Differential Method Automated     eGFR if African American >60.0     eGFR if non  >60.0  Comment:  Calculation used to obtain the estimated glomerular filtration  rate (eGFR) is the CKD-EPI equation. Since race is unknown   in our information system, the eGFR values for   -American and Non--American patients are given   for each creatinine result.       Eosinophil% 10.0(H)     Glucose 133(H)     Gran% 42.0     Hematocrit 34.9(L)     Hemoglobin 11.5(L)     Hypo Occasional     Lymph% 41.0     Magnesium 1.7     MCH 24.4(L)     MCHC 33.0     MCV 74(L)     Mono% 7.0     MPV 8.8(L)     nRBC 3@L=100(A)     Phosphorus 4.0     Platelet Estimate Appears normal     Platelets 184     Poik Slight     Poly Occasional     Potassium 3.7     RBC 4.71     RDW 16.4(H)     Sodium 137     Tear Drop Cells Occasional     WBC 10.37           Significant Imaging: CXR: I have reviewed all pertinent results/findings within the past 24 hours and my personal findings are:  prominent interstitial markings but no signs of acute cardiopulmonary process. Interval chest X ray without any changes to suggest acute chest syndrome.     Assessment/Plan:      * Sickle cell pain crisis    Hb 11.5 with Retic 4.6  Continue continuous hydration at 100ml hr D5 1/2NS  Scheduled 2mg Dilautid with 25mg Benadryl IV Q4H  Scheduled Ketorolac 30mg Q8H for 4 doses considering normal renal function  Daily CBC, CMP considering significant tylenol ingestion  Tylenol level 9.0               Asthma exacerbation    On 2L N/C  Has written for Q4H Duo-Neb and PRN Q6H rescue inhaler            Opioid dependence              Benign essential HTN    Continue home dosage  of:  Amlodipine  Hydralazine            Sickle-cell disease with pain    - switched to Hydromorphone 1mg Q4H with 1mg Q6H PRN for breakthru            VTE Risk Mitigation         Ordered     enoxaparin injection 40 mg  Every 12 hours (non-standard times)     Route:  Subcutaneous        09/29/17 1224     Medium Risk of VTE  Once      09/29/17 1224     Place JOSE LUIS hose  Until discontinued      09/29/17 1224     Place sequential compression device  Until discontinued      09/29/17 1224              Balta Forbes MD  Department of Hospital Medicine   [facility]

## 2017-09-30 NOTE — ASSESSMENT & PLAN NOTE
Hb 11.5 with Retic 4.6  Continue continuous hydration at 100ml hr D5 1/2NS  Scheduled 2mg Dilautid with 25mg Benadryl IV Q4H  Scheduled Ketorolac 30mg Q8H for 4 doses considering normal renal function  Daily CBC, CMP considering significant tylenol ingestion  Tylenol level 9.0

## 2017-09-30 NOTE — SUBJECTIVE & OBJECTIVE
Interval History - NAEON, only required additional Hydromorphone at 4AM. L arm/neck pain improved now with central chest pain without any radiation, pressure or chest tightness. Interval CXR without any change.     Review of Systems   Constitutional: Positive for activity change and fatigue. Negative for appetite change, chills and fever.   HENT: Negative for nosebleeds, postnasal drip, rhinorrhea, sinus pain and sore throat.    Eyes: Negative for visual disturbance.   Respiratory: Positive for cough and shortness of breath. Negative for chest tightness and wheezing.    Cardiovascular: Positive for leg swelling. Negative for chest pain and palpitations.   Gastrointestinal: Negative for abdominal distention, abdominal pain, constipation, diarrhea and vomiting.   Endocrine: Negative for polyphagia and polyuria.   Genitourinary: Negative for difficulty urinating, dysuria and hematuria.   Musculoskeletal: Positive for arthralgias, back pain and myalgias. Negative for neck pain.   Neurological: Positive for weakness. Negative for seizures and headaches.   Hematological: Negative for adenopathy. Does not bruise/bleed easily.   Psychiatric/Behavioral: The patient is not nervous/anxious.      Objective:     Vital Signs (Most Recent):  Temp: 97.8 °F (36.6 °C) (09/30/17 1143)  Pulse: (!) 113 (09/30/17 1143)  Resp: 20 (09/30/17 1143)  BP: 132/78 (09/30/17 1143)  SpO2: (!) 93 % (09/30/17 1143) Vital Signs (24h Range):  Temp:  [97.2 °F (36.2 °C)-98.8 °F (37.1 °C)] 97.8 °F (36.6 °C)  Pulse:  [] 113  Resp:  [16-24] 20  SpO2:  [92 %-99 %] 93 %  BP: (131-185)/() 132/78     Weight: 136.1 kg (300 lb)  Body mass index is 54.87 kg/m².    Physical Exam   Constitutional: She is oriented to person, place, and time. She appears well-developed and well-nourished. No distress.   Morbidly obese   HENT:   Head: Normocephalic and atraumatic.   Right Ear: External ear normal.   Left Ear: External ear normal.   Mouth/Throat: Oropharynx  is clear and moist. Mucous membranes are not dry. No oral lesions. No oropharyngeal exudate.   Eyes: Conjunctivae and EOM are normal. Pupils are equal, round, and reactive to light. Right eye exhibits no discharge. No scleral icterus.   Neck: Normal range of motion. Muscular tenderness present.   Cardiovascular: Normal rate, regular rhythm, S1 normal, S2 normal, normal heart sounds and intact distal pulses.  Exam reveals no gallop and no friction rub.    No murmur heard.  Pulses:       Radial pulses are 2+ on the right side, and 2+ on the left side.        Dorsalis pedis pulses are 2+ on the right side, and 2+ on the left side.   + 1 pitting edema to mid shin R>L   Pulmonary/Chest: Effort normal. No stridor. She has no wheezes. She exhibits no tenderness.   Abdominal: Soft. Bowel sounds are normal. She exhibits no mass. There is no tenderness. There is no guarding. No hernia.   Musculoskeletal: Normal range of motion. She exhibits no edema or tenderness.        Left shoulder: She exhibits no effusion and no crepitus.   Lymphadenopathy:     She has no cervical adenopathy.   Neurological: She is alert and oriented to person, place, and time. She displays no tremor. No cranial nerve deficit or sensory deficit. GCS eye subscore is 4. GCS verbal subscore is 5. GCS motor subscore is 6.   Skin: Skin is warm, dry and intact. No rash noted. She is not diaphoretic.   Psychiatric: She has a normal mood and affect.        Significant Labs:   Recent Lab Results       09/30/17  0429      Anion Gap 9     Aniso Slight     Basophil% 0.0     BUN, Bld 9     Calcium 8.7     Chloride 103     CO2 25     Creatinine 0.9     Differential Method Automated     eGFR if African American >60.0     eGFR if non  >60.0  Comment:  Calculation used to obtain the estimated glomerular filtration  rate (eGFR) is the CKD-EPI equation. Since race is unknown   in our information system, the eGFR values for   -American and  Non--American patients are given   for each creatinine result.       Eosinophil% 10.0(H)     Glucose 133(H)     Gran% 42.0     Hematocrit 34.9(L)     Hemoglobin 11.5(L)     Hypo Occasional     Lymph% 41.0     Magnesium 1.7     MCH 24.4(L)     MCHC 33.0     MCV 74(L)     Mono% 7.0     MPV 8.8(L)     nRBC 3@L=100(A)     Phosphorus 4.0     Platelet Estimate Appears normal     Platelets 184     Poik Slight     Poly Occasional     Potassium 3.7     RBC 4.71     RDW 16.4(H)     Sodium 137     Tear Drop Cells Occasional     WBC 10.37           Significant Imaging: CXR: I have reviewed all pertinent results/findings within the past 24 hours and my personal findings are:  prominent interstitial markings but no signs of acute cardiopulmonary process. Interval chest X ray without any changes to suggest acute chest syndrome.

## 2017-09-30 NOTE — ED NOTES
Pt transported to room 956 A via wheelchair with nurse Pt belongings are with pt, pt condition stable on transport and pt will notify family of room number Pt has 1 L D5  1/2 NS at 100 ml per hr per pump per right forearm saline lock with 446 ml left TBA  Pt requested O2 be removed for transport

## 2017-10-01 PROCEDURE — S5010 5% DEXTROSE AND 0.45% SALINE: HCPCS | Performed by: STUDENT IN AN ORGANIZED HEALTH CARE EDUCATION/TRAINING PROGRAM

## 2017-10-01 PROCEDURE — 25000003 PHARM REV CODE 250: Performed by: STUDENT IN AN ORGANIZED HEALTH CARE EDUCATION/TRAINING PROGRAM

## 2017-10-01 PROCEDURE — 11000001 HC ACUTE MED/SURG PRIVATE ROOM

## 2017-10-01 PROCEDURE — 63600175 PHARM REV CODE 636 W HCPCS: Performed by: STUDENT IN AN ORGANIZED HEALTH CARE EDUCATION/TRAINING PROGRAM

## 2017-10-01 PROCEDURE — 27000221 HC OXYGEN, UP TO 24 HOURS

## 2017-10-01 PROCEDURE — A4216 STERILE WATER/SALINE, 10 ML: HCPCS | Performed by: STUDENT IN AN ORGANIZED HEALTH CARE EDUCATION/TRAINING PROGRAM

## 2017-10-01 PROCEDURE — 94640 AIRWAY INHALATION TREATMENT: CPT

## 2017-10-01 PROCEDURE — 94760 N-INVAS EAR/PLS OXIMETRY 1: CPT

## 2017-10-01 PROCEDURE — 25000242 PHARM REV CODE 250 ALT 637 W/ HCPCS: Performed by: STUDENT IN AN ORGANIZED HEALTH CARE EDUCATION/TRAINING PROGRAM

## 2017-10-01 PROCEDURE — 99232 SBSQ HOSP IP/OBS MODERATE 35: CPT | Mod: ,,, | Performed by: HOSPITALIST

## 2017-10-01 RX ORDER — POLYETHYLENE GLYCOL 3350 17 G/17G
17 POWDER, FOR SOLUTION ORAL DAILY
Status: DISCONTINUED | OUTPATIENT
Start: 2017-10-01 | End: 2017-10-03 | Stop reason: HOSPADM

## 2017-10-01 RX ORDER — IPRATROPIUM BROMIDE AND ALBUTEROL SULFATE 2.5; .5 MG/3ML; MG/3ML
3 SOLUTION RESPIRATORY (INHALATION) EVERY 4 HOURS PRN
Status: DISCONTINUED | OUTPATIENT
Start: 2017-10-01 | End: 2017-10-03 | Stop reason: HOSPADM

## 2017-10-01 RX ADMIN — Medication 3 ML: at 01:10

## 2017-10-01 RX ADMIN — GABAPENTIN 300 MG: 300 CAPSULE ORAL at 08:10

## 2017-10-01 RX ADMIN — HYDRALAZINE HYDROCHLORIDE 100 MG: 50 TABLET ORAL at 07:10

## 2017-10-01 RX ADMIN — CARBAMAZEPINE 400 MG: 200 TABLET ORAL at 08:10

## 2017-10-01 RX ADMIN — DEXTROSE MONOHYDRATE AND SODIUM CHLORIDE: 5; .45 INJECTION, SOLUTION INTRAVENOUS at 12:10

## 2017-10-01 RX ADMIN — DIPHENHYDRAMINE HYDROCHLORIDE 25 MG: 50 INJECTION, SOLUTION INTRAMUSCULAR; INTRAVENOUS at 04:10

## 2017-10-01 RX ADMIN — BACLOFEN 10 MG: 10 TABLET ORAL at 08:10

## 2017-10-01 RX ADMIN — DEXTROSE MONOHYDRATE AND SODIUM CHLORIDE: 5; .45 INJECTION, SOLUTION INTRAVENOUS at 09:10

## 2017-10-01 RX ADMIN — OXYCODONE HYDROCHLORIDE 10 MG: 5 TABLET ORAL at 11:10

## 2017-10-01 RX ADMIN — STANDARDIZED SENNA CONCENTRATE AND DOCUSATE SODIUM 1 TABLET: 8.6; 5 TABLET, FILM COATED ORAL at 08:10

## 2017-10-01 RX ADMIN — HYDROMORPHONE HYDROCHLORIDE 1 MG: 1 INJECTION, SOLUTION INTRAMUSCULAR; INTRAVENOUS; SUBCUTANEOUS at 04:10

## 2017-10-01 RX ADMIN — POLYETHYLENE GLYCOL 3350 17 G: 17 POWDER, FOR SOLUTION ORAL at 01:10

## 2017-10-01 RX ADMIN — HYDROMORPHONE HYDROCHLORIDE 1 MG: 1 INJECTION, SOLUTION INTRAMUSCULAR; INTRAVENOUS; SUBCUTANEOUS at 12:10

## 2017-10-01 RX ADMIN — HYDROMORPHONE HYDROCHLORIDE 1 MG: 1 INJECTION, SOLUTION INTRAMUSCULAR; INTRAVENOUS; SUBCUTANEOUS at 08:10

## 2017-10-01 RX ADMIN — HYDROMORPHONE HYDROCHLORIDE 1 MG: 1 INJECTION, SOLUTION INTRAMUSCULAR; INTRAVENOUS; SUBCUTANEOUS at 09:10

## 2017-10-01 RX ADMIN — OXYCODONE HYDROCHLORIDE 10 MG: 5 TABLET ORAL at 09:10

## 2017-10-01 RX ADMIN — ENOXAPARIN SODIUM 40 MG: 100 INJECTION SUBCUTANEOUS at 12:10

## 2017-10-01 RX ADMIN — DIPHENHYDRAMINE HYDROCHLORIDE 25 MG: 50 INJECTION, SOLUTION INTRAMUSCULAR; INTRAVENOUS at 08:10

## 2017-10-01 RX ADMIN — Medication 3 ML: at 09:10

## 2017-10-01 RX ADMIN — DIPHENHYDRAMINE HYDROCHLORIDE 25 MG: 50 INJECTION, SOLUTION INTRAMUSCULAR; INTRAVENOUS at 12:10

## 2017-10-01 RX ADMIN — HYDROXYUREA 500 MG: 500 CAPSULE ORAL at 08:10

## 2017-10-01 RX ADMIN — IPRATROPIUM BROMIDE AND ALBUTEROL SULFATE 3 ML: .5; 3 SOLUTION RESPIRATORY (INHALATION) at 03:10

## 2017-10-01 RX ADMIN — OXYCODONE HYDROCHLORIDE 10 MG: 5 TABLET ORAL at 03:10

## 2017-10-01 RX ADMIN — IPRATROPIUM BROMIDE AND ALBUTEROL SULFATE 3 ML: .5; 3 SOLUTION RESPIRATORY (INHALATION) at 07:10

## 2017-10-01 RX ADMIN — FLUOXETINE 40 MG: 20 CAPSULE ORAL at 08:10

## 2017-10-01 RX ADMIN — HYDROMORPHONE HYDROCHLORIDE 1 MG: 1 INJECTION, SOLUTION INTRAMUSCULAR; INTRAVENOUS; SUBCUTANEOUS at 03:10

## 2017-10-01 RX ADMIN — AMLODIPINE BESYLATE 10 MG: 10 TABLET ORAL at 08:10

## 2017-10-01 NOTE — PROGRESS NOTES
[facility] Hospital Medicine  Progress Note    Patient Name: Nazanin Malone  MRN: [id number]  Patient Class: IP- Inpatient   Admission Date: 9/29/2017  Length of Stay: 2 days  Attending Physician: Lorin Clark MD  Primary Care Provider: Primary Doctor [facility] Medicine Team: [facility] 2 Balta Forbes MD    Subjective:     Principal Problem:Sickle cell pain crisis    HPI:  Ms. Malone is a 37 year old female with a PMHx of sickle cell, beta thalassemia, asthma, HTN and trigeminal neuralgia who presents today with an acute pain crisis secondary to sickle cell/beta thalassemia which may have been triggered by recent asthma exacerbation. Her pain crisis began yesterday morning at which point she called her MD for a percocet rx. She notes that the pain began in her left shoulder with left neck and left arm pain and weakness, it is a 10/10, deep pain and has been constant since it began. She notes that she has been having more crises since she moved back to [place] recently and has been having them monthly in association with her menses. She was able to fill the prescription at 5PM yesterday and began taken 2-3 (Percocet 10mg tablets) every 4 hours from 6 pm until 6am this morning. The pain relief was not sufficient and barely took the edge off. She notes that this is similar to her recurrent crises. She did not feel that there was anything which made her pain worse although she does believe that her recent asthma exacerbation which she was seen in the [facility] ED on 9/25 may have led to this episode as well. In the ED on 9/25 she was given a Duo-Neb treatment, a dose of prednisone (60mg) and then discharged on Prednisone 40mg BID x 5 days. In her history of this asthma episode she noted that her symptoms began around 9/17 and she took some left over prednisone 40mg x 4 days until ~ 9/21. Up to presentation on 9/25 she was having increasing difficulty managing with her home nebulizer and  rescue inhaler. Her last hospitalization at [facility] occurred in April 2017 and she was found to have Acute Chest Syndrome with multifocal PNA.     Hospital Course:  9/29/2017 ED course - seen by ED JAI/MD and given 1L bolus, Norvasc 10, Hydralazine 100, Benadryl 25, Dilautid 1mg x 3 (last dose 1005) and started on D5 1/2NS infusion  CXR with chronic prominent interstitial markings    9/30 - pain well controlled, titrated Hydromorphone requirements to 1mg Q4 with .5mg PRN Q8 for severe and Oxy IR 10mg Q8 for moderate. Oxygen saturations stable, interval CXR without change, H/H stable    10/01 - patient complaining of pain but not utilizing all PRN medications - no change to pain medication ATT. Oxygen saturations stable, uses N/C for comfort. H/H stable    Interval History - NAEON req 1 PRN Hydromorphone overnight and 1 this morning. Sleeping comfortable, complaining of pain returning to L arm and that she desires to have a BM    Review of Systems   Constitutional: Positive for activity change and fatigue. Negative for appetite change, chills and fever.   HENT: Negative for nosebleeds, postnasal drip, rhinorrhea, sinus pain and sore throat.    Eyes: Negative for visual disturbance.   Respiratory: Positive for cough and shortness of breath. Negative for chest tightness and wheezing.    Cardiovascular: Positive for leg swelling. Negative for chest pain and palpitations.   Gastrointestinal: Negative for abdominal distention, abdominal pain, constipation, diarrhea and vomiting.   Endocrine: Negative for polyphagia and polyuria.   Genitourinary: Negative for difficulty urinating, dysuria and hematuria.   Musculoskeletal: Positive for arthralgias, back pain and myalgias. Negative for neck pain.   Neurological: Positive for weakness. Negative for seizures and headaches.   Hematological: Negative for adenopathy. Does not bruise/bleed easily.   Psychiatric/Behavioral: The patient is not nervous/anxious.      Objective:      Vital Signs (Most Recent):  Temp: 99.5 °F (37.5 °C) (10/01/17 1213)  Pulse: (!) 126 (10/01/17 1213)  Resp: 19 (10/01/17 1213)  BP: (!) 117/56 (10/01/17 1213)  SpO2: (!) 93 % (10/01/17 1213) Vital Signs (24h Range):  Temp:  [97.8 °F (36.6 °C)-99.7 °F (37.6 °C)] 99.5 °F (37.5 °C)  Pulse:  [109-126] 126  Resp:  [18-20] 19  SpO2:  [92 %-98 %] 93 %  BP: (104-140)/(52-78) 117/56     Weight: 136.1 kg (300 lb)  Body mass index is 54.87 kg/m².    Physical Exam   Constitutional: She is oriented to person, place, and time. She appears well-developed and well-nourished. No distress.   Morbidly obese   HENT:   Head: Normocephalic and atraumatic.   Right Ear: External ear normal.   Left Ear: External ear normal.   Mouth/Throat: Oropharynx is clear and moist. Mucous membranes are not dry. No oral lesions. No oropharyngeal exudate.   Eyes: Conjunctivae and EOM are normal. Pupils are equal, round, and reactive to light. Right eye exhibits no discharge. No scleral icterus.   Neck: Normal range of motion. Muscular tenderness present.   Cardiovascular: Normal rate, regular rhythm, S1 normal, S2 normal, normal heart sounds and intact distal pulses.  Exam reveals no gallop and no friction rub.    No murmur heard.  Pulses:       Radial pulses are 2+ on the right side, and 2+ on the left side.        Dorsalis pedis pulses are 2+ on the right side, and 2+ on the left side.   + 1 pitting edema to mid shin R>L   Pulmonary/Chest: Effort normal. No stridor. She has no wheezes. She exhibits no tenderness.   Abdominal: Soft. Bowel sounds are normal. She exhibits no mass. There is no tenderness. There is no guarding. No hernia.   Musculoskeletal: Normal range of motion. She exhibits no edema or tenderness.        Left shoulder: She exhibits no effusion and no crepitus.   Lymphadenopathy:     She has no cervical adenopathy.   Neurological: She is alert and oriented to person, place, and time. She displays no tremor. No cranial nerve deficit or  sensory deficit. GCS eye subscore is 4. GCS verbal subscore is 5. GCS motor subscore is 6.   Skin: Skin is warm, dry and intact. No rash noted. She is not diaphoretic.   Psychiatric: She has a normal mood and affect.        Significant Labs:   H/H stable, no acute changes    Significant Imaging: CXR: I have reviewed all pertinent results/findings within the past 24 hours and my personal findings are:  prominent interstitial markings but no signs of acute cardiopulmonary process. Interval chest X ray without any changes to suggest acute chest syndrome.     Assessment/Plan:      * Sickle cell pain crisis    Hb 11.5 with Retic 4.6 interval H/H stable  Scheduled 1mg Hydromorphone with 25mg Benadryl IV Q4H PRN  Daily CBC, BMP  Tylenol level 9.0 on admit              Asthma exacerbation    On 2L N/C  Has written for Q4H Duo-Neb PRN and PRN Q6H rescue inhaler  Sats >98 percent            Opioid dependence              Benign essential HTN    Continue home dosage of:  Amlodipine  Hydralazine  - hold hydralazine with SBP <120            Sickle-cell disease with pain    - switched to Hydromorphone 1mg Q4H with 1mg Q6H PRN for breakthru  - Oxy IR 10mg for moderate pain             VTE Risk Mitigation         Ordered     enoxaparin injection 40 mg  Every 12 hours (non-standard times)     Route:  Subcutaneous        09/29/17 1224     Medium Risk of VTE  Once      09/29/17 1224     Place JOSE LUIS hose  Until discontinued      09/29/17 1224     Place sequential compression device  Until discontinued      09/29/17 1224              Balta Forbes MD  Department of Hospital Medicine   [facility]

## 2017-10-01 NOTE — ASSESSMENT & PLAN NOTE
Hb 11.5 with Retic 4.6 interval H/H stable  Scheduled 1mg Hydromorphone with 25mg Benadryl IV Q4H PRN  Daily CBC, BMP  Tylenol level 9.0 on admit

## 2017-10-01 NOTE — SUBJECTIVE & OBJECTIVE
Interval History - NAEON req 1 PRN Hydromorphone overnight and 1 this morning. Sleeping comfortable, complaining of pain returning to L arm and that she desires to have a BM    Review of Systems   Constitutional: Positive for activity change and fatigue. Negative for appetite change, chills and fever.   HENT: Negative for nosebleeds, postnasal drip, rhinorrhea, sinus pain and sore throat.    Eyes: Negative for visual disturbance.   Respiratory: Positive for cough and shortness of breath. Negative for chest tightness and wheezing.    Cardiovascular: Positive for leg swelling. Negative for chest pain and palpitations.   Gastrointestinal: Negative for abdominal distention, abdominal pain, constipation, diarrhea and vomiting.   Endocrine: Negative for polyphagia and polyuria.   Genitourinary: Negative for difficulty urinating, dysuria and hematuria.   Musculoskeletal: Positive for arthralgias, back pain and myalgias. Negative for neck pain.   Neurological: Positive for weakness. Negative for seizures and headaches.   Hematological: Negative for adenopathy. Does not bruise/bleed easily.   Psychiatric/Behavioral: The patient is not nervous/anxious.      Objective:     Vital Signs (Most Recent):  Temp: 99.5 °F (37.5 °C) (10/01/17 1213)  Pulse: (!) 126 (10/01/17 1213)  Resp: 19 (10/01/17 1213)  BP: (!) 117/56 (10/01/17 1213)  SpO2: (!) 93 % (10/01/17 1213) Vital Signs (24h Range):  Temp:  [97.8 °F (36.6 °C)-99.7 °F (37.6 °C)] 99.5 °F (37.5 °C)  Pulse:  [109-126] 126  Resp:  [18-20] 19  SpO2:  [92 %-98 %] 93 %  BP: (104-140)/(52-78) 117/56     Weight: 136.1 kg (300 lb)  Body mass index is 54.87 kg/m².    Physical Exam   Constitutional: She is oriented to person, place, and time. She appears well-developed and well-nourished. No distress.   Morbidly obese   HENT:   Head: Normocephalic and atraumatic.   Right Ear: External ear normal.   Left Ear: External ear normal.   Mouth/Throat: Oropharynx is clear and moist. Mucous  membranes are not dry. No oral lesions. No oropharyngeal exudate.   Eyes: Conjunctivae and EOM are normal. Pupils are equal, round, and reactive to light. Right eye exhibits no discharge. No scleral icterus.   Neck: Normal range of motion. Muscular tenderness present.   Cardiovascular: Normal rate, regular rhythm, S1 normal, S2 normal, normal heart sounds and intact distal pulses.  Exam reveals no gallop and no friction rub.    No murmur heard.  Pulses:       Radial pulses are 2+ on the right side, and 2+ on the left side.        Dorsalis pedis pulses are 2+ on the right side, and 2+ on the left side.   + 1 pitting edema to mid shin R>L   Pulmonary/Chest: Effort normal. No stridor. She has no wheezes. She exhibits no tenderness.   Abdominal: Soft. Bowel sounds are normal. She exhibits no mass. There is no tenderness. There is no guarding. No hernia.   Musculoskeletal: Normal range of motion. She exhibits no edema or tenderness.        Left shoulder: She exhibits no effusion and no crepitus.   Lymphadenopathy:     She has no cervical adenopathy.   Neurological: She is alert and oriented to person, place, and time. She displays no tremor. No cranial nerve deficit or sensory deficit. GCS eye subscore is 4. GCS verbal subscore is 5. GCS motor subscore is 6.   Skin: Skin is warm, dry and intact. No rash noted. She is not diaphoretic.   Psychiatric: She has a normal mood and affect.        Significant Labs:   H/H stable, no acute changes    Significant Imaging: CXR: I have reviewed all pertinent results/findings within the past 24 hours and my personal findings are:  prominent interstitial markings but no signs of acute cardiopulmonary process. Interval chest X ray without any changes to suggest acute chest syndrome.

## 2017-10-01 NOTE — ASSESSMENT & PLAN NOTE
- switched to Hydromorphone 1mg Q4H with 1mg Q6H PRN for breakthru  - Oxy IR 10mg for moderate pain

## 2017-10-01 NOTE — PLAN OF CARE
Problem: Patient Care Overview  Goal: Plan of Care Review  Outcome: Ongoing (interventions implemented as appropriate)  Vital signs stable on O2 at 2 lpm. Free of falls. Receiving pain medication as scheduled, as well as RT treatments. IV fluids continue to infuse as ordered. Patient sleeping at the moment, respirations unlabored, easy to arouse. Monitoring closely.    Problem: Fall Risk (Adult)  Goal: Identify Related Risk Factors and Signs and Symptoms  Related risk factors and signs and symptoms are identified upon initiation of Human Response Clinical Practice Guideline (CPG)   Outcome: Ongoing (interventions implemented as appropriate)  Instructed patient to please call for assistance. Call light in reach. Room near station. Bed remains in lowest position.    Problem: Pain, Acute (Adult)  Goal: Identify Related Risk Factors and Signs and Symptoms  Related risk factors and signs and symptoms are identified upon initiation of Human Response Clinical Practice Guideline (CPG)   Outcome: Ongoing (interventions implemented as appropriate)  Patient receiving scheduled IV dilaudid for left arm pain, as well as benadryl. Pain relief for a short period. Patient needing breakthrough dilaudid and oxycodone prn. Warm compresses provided. Relaxation techniques.

## 2017-10-02 LAB
BASOPHILS # BLD AUTO: 0.01 K/UL
BASOPHILS NFR BLD: 0.1 %
DIFFERENTIAL METHOD: ABNORMAL
EOSINOPHIL # BLD AUTO: 0.5 K/UL
EOSINOPHIL NFR BLD: 5.1 %
ERYTHROCYTE [DISTWIDTH] IN BLOOD BY AUTOMATED COUNT: 16.4 %
HCT VFR BLD AUTO: 31.2 %
HGB BLD-MCNC: 10.3 G/DL
LYMPHOCYTES # BLD AUTO: 2.6 K/UL
LYMPHOCYTES NFR BLD: 29.7 %
MCH RBC QN AUTO: 24.2 PG
MCHC RBC AUTO-ENTMCNC: 33 G/DL
MCV RBC AUTO: 73 FL
MONOCYTES # BLD AUTO: 0.8 K/UL
MONOCYTES NFR BLD: 9 %
NEUTROPHILS # BLD AUTO: 5 K/UL
NEUTROPHILS NFR BLD: 56 %
PLATELET # BLD AUTO: 165 K/UL
PMV BLD AUTO: 8.7 FL
RBC # BLD AUTO: 4.25 M/UL
WBC # BLD AUTO: 8.87 K/UL

## 2017-10-02 PROCEDURE — 85025 COMPLETE CBC W/AUTO DIFF WBC: CPT

## 2017-10-02 PROCEDURE — S5010 5% DEXTROSE AND 0.45% SALINE: HCPCS | Performed by: STUDENT IN AN ORGANIZED HEALTH CARE EDUCATION/TRAINING PROGRAM

## 2017-10-02 PROCEDURE — 25000003 PHARM REV CODE 250: Performed by: INTERNAL MEDICINE

## 2017-10-02 PROCEDURE — 63600175 PHARM REV CODE 636 W HCPCS: Performed by: STUDENT IN AN ORGANIZED HEALTH CARE EDUCATION/TRAINING PROGRAM

## 2017-10-02 PROCEDURE — 25000003 PHARM REV CODE 250: Performed by: STUDENT IN AN ORGANIZED HEALTH CARE EDUCATION/TRAINING PROGRAM

## 2017-10-02 PROCEDURE — 99232 SBSQ HOSP IP/OBS MODERATE 35: CPT | Mod: ,,, | Performed by: HOSPITALIST

## 2017-10-02 PROCEDURE — A4216 STERILE WATER/SALINE, 10 ML: HCPCS | Performed by: STUDENT IN AN ORGANIZED HEALTH CARE EDUCATION/TRAINING PROGRAM

## 2017-10-02 PROCEDURE — 27000221 HC OXYGEN, UP TO 24 HOURS

## 2017-10-02 PROCEDURE — 94760 N-INVAS EAR/PLS OXIMETRY 1: CPT

## 2017-10-02 PROCEDURE — 11000001 HC ACUTE MED/SURG PRIVATE ROOM

## 2017-10-02 PROCEDURE — 94640 AIRWAY INHALATION TREATMENT: CPT

## 2017-10-02 PROCEDURE — 36415 COLL VENOUS BLD VENIPUNCTURE: CPT

## 2017-10-02 PROCEDURE — 25000242 PHARM REV CODE 250 ALT 637 W/ HCPCS: Performed by: STUDENT IN AN ORGANIZED HEALTH CARE EDUCATION/TRAINING PROGRAM

## 2017-10-02 RX ORDER — OXYCODONE HYDROCHLORIDE 5 MG/1
10 TABLET ORAL EVERY 6 HOURS
Status: DISCONTINUED | OUTPATIENT
Start: 2017-10-02 | End: 2017-10-03 | Stop reason: HOSPADM

## 2017-10-02 RX ORDER — MORPHINE SULFATE 30 MG/1
30 TABLET, FILM COATED, EXTENDED RELEASE ORAL 2 TIMES DAILY
Status: ON HOLD | COMMUNITY
End: 2017-10-02 | Stop reason: CLARIF

## 2017-10-02 RX ADMIN — HYDROMORPHONE HYDROCHLORIDE 1 MG: 1 INJECTION, SOLUTION INTRAMUSCULAR; INTRAVENOUS; SUBCUTANEOUS at 04:10

## 2017-10-02 RX ADMIN — HYDROMORPHONE HYDROCHLORIDE 1 MG: 1 INJECTION, SOLUTION INTRAMUSCULAR; INTRAVENOUS; SUBCUTANEOUS at 12:10

## 2017-10-02 RX ADMIN — DIPHENHYDRAMINE HYDROCHLORIDE 25 MG: 50 INJECTION, SOLUTION INTRAMUSCULAR; INTRAVENOUS at 04:10

## 2017-10-02 RX ADMIN — ENOXAPARIN SODIUM 40 MG: 100 INJECTION SUBCUTANEOUS at 12:10

## 2017-10-02 RX ADMIN — OXYCODONE HYDROCHLORIDE 10 MG: 5 TABLET ORAL at 05:10

## 2017-10-02 RX ADMIN — GABAPENTIN 300 MG: 300 CAPSULE ORAL at 08:10

## 2017-10-02 RX ADMIN — OXYCODONE HYDROCHLORIDE 10 MG: 5 TABLET ORAL at 11:10

## 2017-10-02 RX ADMIN — DIPHENHYDRAMINE HYDROCHLORIDE 25 MG: 50 INJECTION, SOLUTION INTRAMUSCULAR; INTRAVENOUS at 09:10

## 2017-10-02 RX ADMIN — ENOXAPARIN SODIUM 40 MG: 100 INJECTION SUBCUTANEOUS at 03:10

## 2017-10-02 RX ADMIN — POLYETHYLENE GLYCOL 3350 17 G: 17 POWDER, FOR SOLUTION ORAL at 08:10

## 2017-10-02 RX ADMIN — DIPHENHYDRAMINE HYDROCHLORIDE 25 MG: 50 INJECTION, SOLUTION INTRAMUSCULAR; INTRAVENOUS at 12:10

## 2017-10-02 RX ADMIN — HYDRALAZINE HYDROCHLORIDE 100 MG: 50 TABLET ORAL at 03:10

## 2017-10-02 RX ADMIN — HYDROMORPHONE HYDROCHLORIDE 1 MG: 1 INJECTION, SOLUTION INTRAMUSCULAR; INTRAVENOUS; SUBCUTANEOUS at 09:10

## 2017-10-02 RX ADMIN — DIPHENHYDRAMINE HYDROCHLORIDE 25 MG: 50 INJECTION, SOLUTION INTRAMUSCULAR; INTRAVENOUS at 11:10

## 2017-10-02 RX ADMIN — CARBAMAZEPINE 400 MG: 200 TABLET ORAL at 08:10

## 2017-10-02 RX ADMIN — HYDROMORPHONE HYDROCHLORIDE 1 MG: 1 INJECTION, SOLUTION INTRAMUSCULAR; INTRAVENOUS; SUBCUTANEOUS at 03:10

## 2017-10-02 RX ADMIN — STANDARDIZED SENNA CONCENTRATE AND DOCUSATE SODIUM 1 TABLET: 8.6; 5 TABLET, FILM COATED ORAL at 09:10

## 2017-10-02 RX ADMIN — OXYCODONE HYDROCHLORIDE 10 MG: 5 TABLET ORAL at 07:10

## 2017-10-02 RX ADMIN — Medication 3 ML: at 03:10

## 2017-10-02 RX ADMIN — DIPHENHYDRAMINE HYDROCHLORIDE 25 MG: 50 INJECTION, SOLUTION INTRAMUSCULAR; INTRAVENOUS at 08:10

## 2017-10-02 RX ADMIN — HYDROXYUREA 500 MG: 500 CAPSULE ORAL at 08:10

## 2017-10-02 RX ADMIN — DEXTROSE MONOHYDRATE AND SODIUM CHLORIDE: 5; .45 INJECTION, SOLUTION INTRAVENOUS at 05:10

## 2017-10-02 RX ADMIN — Medication 3 ML: at 09:10

## 2017-10-02 RX ADMIN — GABAPENTIN 300 MG: 300 CAPSULE ORAL at 09:10

## 2017-10-02 RX ADMIN — IPRATROPIUM BROMIDE AND ALBUTEROL SULFATE 3 ML: .5; 3 SOLUTION RESPIRATORY (INHALATION) at 04:10

## 2017-10-02 RX ADMIN — HYDRALAZINE HYDROCHLORIDE 100 MG: 50 TABLET ORAL at 04:10

## 2017-10-02 RX ADMIN — BACLOFEN 10 MG: 10 TABLET ORAL at 09:10

## 2017-10-02 RX ADMIN — STANDARDIZED SENNA CONCENTRATE AND DOCUSATE SODIUM 1 TABLET: 8.6; 5 TABLET, FILM COATED ORAL at 08:10

## 2017-10-02 RX ADMIN — CARBAMAZEPINE 400 MG: 200 TABLET ORAL at 09:10

## 2017-10-02 RX ADMIN — BACLOFEN 10 MG: 10 TABLET ORAL at 08:10

## 2017-10-02 RX ADMIN — IPRATROPIUM BROMIDE AND ALBUTEROL SULFATE 3 ML: .5; 3 SOLUTION RESPIRATORY (INHALATION) at 05:10

## 2017-10-02 RX ADMIN — HYDROMORPHONE HYDROCHLORIDE 1 MG: 1 INJECTION, SOLUTION INTRAMUSCULAR; INTRAVENOUS; SUBCUTANEOUS at 08:10

## 2017-10-02 RX ADMIN — HYDRALAZINE HYDROCHLORIDE 100 MG: 50 TABLET ORAL at 09:10

## 2017-10-02 RX ADMIN — Medication 3 ML: at 04:10

## 2017-10-02 RX ADMIN — FLUOXETINE 40 MG: 20 CAPSULE ORAL at 08:10

## 2017-10-02 RX ADMIN — HYDROMORPHONE HYDROCHLORIDE 1 MG: 1 INJECTION, SOLUTION INTRAMUSCULAR; INTRAVENOUS; SUBCUTANEOUS at 11:10

## 2017-10-02 RX ADMIN — DIPHENHYDRAMINE HYDROCHLORIDE 25 MG: 50 INJECTION, SOLUTION INTRAMUSCULAR; INTRAVENOUS at 03:10

## 2017-10-02 NOTE — SUBJECTIVE & OBJECTIVE
Interval History:   NAEON. Pain better tolerated.    Review of Systems   Constitutional: no fever or chills  ENT: no nasal congestion or sore throat  Respiratory: no cough or shortness of breath  Cardiovascular: no chest pain or palpitations  Gastrointestinal: no nausea or vomiting, no abdominal pain     Genitourinary: no hematuria or dysuria  Integument/Breast: no rash or pruritis  Hematologic/Lymphatic: no easy bruising or lymphadenopathy  Musculoskeletal: + arthralgias and myalgias  Neurological: no seizures or tremors  Endocrine: no heat or cold intolerance    Objective:     Vital Signs (Most Recent):  Temp: 100.3 °F (37.9 °C) (10/01/17 1611)  Pulse: (!) 118 (10/01/17 1611)  Resp: 19 (10/01/17 1611)  BP: (!) 155/74 (10/01/17 1611)  SpO2: 95 % (10/01/17 1611) Vital Signs (24h Range):  Temp:  [98.9 °F (37.2 °C)-100.3 °F (37.9 °C)] 100.3 °F (37.9 °C)  Pulse:  [109-126] 118  Resp:  [18-20] 19  SpO2:  [93 %-97 %] 95 %  BP: (117-155)/(56-78) 155/74     Weight: 136.1 kg (300 lb)  Body mass index is 54.87 kg/m².  No intake or output data in the 24 hours ending 10/01/17 2129   Physical Exam  General: obese lady who appears to be in NAD  Eyes: conjunctival icterus,   PERRL. EOMI.  Neck: supple, symmetrical, trachea midline, no JVD  Cardiovascular: Heart: regular rate and rhythm, S1, S2 normal, no murmur, click, rub or gallop.  Lungs: clear to auscultation bilaterally and normal respiratory effort  Chest Wall: no tenderness  Abdomen/Rectal: Abdomen: Non distended. + BS. No masses. No TTP.   Extremities: no redness or tenderness in the calves or thighs. Pulses: 2+ and symmetric  Skin: Skin color, texture, turgor normal. No rashes or lesions  Musculoskeletal: full range of motion of joints  Lymph Nodes: No cervical or supraclavicular adenopathy  Psych/Behavioral: Normal. Alert and oriented, appropriate affect.    Significant Labs: no recent labs    Significant Imaging:     CXR: no consolidation or evidence of acute chest  syndrome

## 2017-10-02 NOTE — ASSESSMENT & PLAN NOTE
Hb 11.5 with Retic 4.6 interval H/H stable, low haptoglobin < 10 and elevated LDH  Daily CBC, BMP  Tylenol level 9.0 on admit

## 2017-10-02 NOTE — PLAN OF CARE
Problem: Patient Care Overview  Goal: Plan of Care Review  Outcome: Ongoing (interventions implemented as appropriate)  Plan of care reviewed and updated. Pt AA+O. Pt's pain is managed with the medication ordered at this time. Pt's VS are as charted.  No falls this shift. Pt is oriented to room and call system. Will continue to [facility].

## 2017-10-02 NOTE — ASSESSMENT & PLAN NOTE
Concern for opioid dependence behavior  De-escalate on pain med and discharge with no opioid refills

## 2017-10-02 NOTE — PHARMACY MED REC
"Admission Medication Reconciliation - Pharmacy Consult Note    The home medication history was taken by Marie Ng, Pharmacy Technician    Based on information gathered and subsequent review by the clinical pharmacist, the items below may need attention.    You may go to "Admission" then "Reconcile Home Medications" tabs to review and/or act upon these items.    No issues noted with the medication reconciliation.    Potential issues to be addressed PRIOR TO DISCHARGE  o Patient requests bedside delivery of any new meds  o Patient has history of Coreg prescription but states she does not take    Please address this information as you see fit.  Feel free to contact us if you have any questions or require assistance.    Ralph Piña, PharmD  [id number]        Patient's prior to admission medication regimen was as follows:  Prescriptions Prior to Admission   Medication Sig Dispense Refill Last Dose    albuterol (ACCUNEB) 0.63 mg/3 mL Nebu Take 0.63 mg by nebulization every 6 (six) hours as needed (for wheezing/shortness of breath). Rescue    9/28/2017    albuterol (VENTOLIN HFA) 90 mcg/actuation inhaler Inhale 2 puffs into the lungs every 6 (six) hours as needed for Wheezing. Rescue   9/28/2017    amlodipine (NORVASC) 10 MG tablet Take 1 tablet (10 mg total) by mouth once daily. 30 tablet 3 9/28/2017    baclofen (LIORESAL) 10 MG tablet Take 1 tablet (10 mg total) by mouth 2 (two) times daily. 60 tablet 2 9/28/2017    budesonide-formoterol 160-4.5 mcg (SYMBICORT) 160-4.5 mcg/actuation HFAA Inhale 2 puffs into the lungs every 12 (twelve) hours. Controller 10.2 g 2 9/28/2017    carbamazepine (TEGRETOL) 200 mg tablet Take 2 tablets (400 mg total) by mouth 2 (two) times daily. 60 tablet 0 9/28/2017    ergocalciferol (VITAMIN D2) 50,000 unit Cap Take 1 capsule (50,000 Units total) by mouth every 7 days. (Patient taking differently: Take 50,000 Units by mouth every Monday. ) 12 capsule 3 9/28/2017    fluoxetine (PROZAC) " 40 MG capsule Take 40 mg by mouth once daily.   9/28/2017    gabapentin (NEURONTIN) 300 MG capsule Take 1 capsule (300 mg total) by mouth 2 (two) times daily. 60 capsule 5 9/28/2017    hydrALAZINE (APRESOLINE) 100 MG tablet Take 1 tablet (100 mg total) by mouth every 8 (eight) hours. 90 tablet 3 9/28/2017    hydroxyurea (HYDREA) 500 mg Cap Take 1 capsule (500 mg total) by mouth once daily. 60 capsule 2 9/28/2017    ondansetron (ZOFRAN-ODT) 8 MG TbDL Take 1 tablet (8 mg total) by mouth every 6 (six) hours as needed. 25 tablet 0 9/28/2017    oxycodone-acetaminophen (PERCOCET)  mg per tablet Take 1 tablet by mouth every 4 (four) hours as needed for Pain. 120 tablet 0 9/28/2017    senna-docusate 8.6-50 mg (PERICOLACE) 8.6-50 mg per tablet Take 1 tablet by mouth 2 (two) times daily.   9/28/2017    carvedilol (COREG) 25 MG tablet Take 1 tablet (25 mg total) by mouth 2 (two) times daily. 60 tablet 3          Please add appropriate    SmartPhrase below:

## 2017-10-02 NOTE — ASSESSMENT & PLAN NOTE
- switching Dilaudid to 1mg Q6H PRN and d/c scheduled  - Oxy IR 10mg q6h for moderate pain   - continue IVF

## 2017-10-03 VITALS
WEIGHT: 293 LBS | DIASTOLIC BLOOD PRESSURE: 65 MMHG | HEART RATE: 102 BPM | HEIGHT: 62 IN | SYSTOLIC BLOOD PRESSURE: 127 MMHG | OXYGEN SATURATION: 91 % | BODY MASS INDEX: 53.92 KG/M2 | RESPIRATION RATE: 19 BRPM | TEMPERATURE: 99 F

## 2017-10-03 PROBLEM — J45.901 ASTHMA EXACERBATION: Status: RESOLVED | Noted: 2017-04-25 | Resolved: 2017-10-03

## 2017-10-03 PROBLEM — D57.00 SICKLE CELL PAIN CRISIS: Status: RESOLVED | Noted: 2017-09-29 | Resolved: 2017-10-03

## 2017-10-03 PROBLEM — R06.83 SNORING: Status: RESOLVED | Noted: 2017-10-03 | Resolved: 2017-10-03

## 2017-10-03 PROBLEM — E66.01 MORBID OBESITY DUE TO EXCESS CALORIES: Status: RESOLVED | Noted: 2017-04-25 | Resolved: 2017-10-03

## 2017-10-03 PROBLEM — R06.83 SNORING: Status: ACTIVE | Noted: 2017-10-03

## 2017-10-03 PROCEDURE — 25000003 PHARM REV CODE 250: Performed by: INTERNAL MEDICINE

## 2017-10-03 PROCEDURE — 63600175 PHARM REV CODE 636 W HCPCS: Performed by: STUDENT IN AN ORGANIZED HEALTH CARE EDUCATION/TRAINING PROGRAM

## 2017-10-03 PROCEDURE — 25000003 PHARM REV CODE 250: Performed by: STUDENT IN AN ORGANIZED HEALTH CARE EDUCATION/TRAINING PROGRAM

## 2017-10-03 PROCEDURE — 99239 HOSP IP/OBS DSCHRG MGMT >30: CPT | Mod: ,,, | Performed by: HOSPITALIST

## 2017-10-03 PROCEDURE — S5010 5% DEXTROSE AND 0.45% SALINE: HCPCS | Performed by: STUDENT IN AN ORGANIZED HEALTH CARE EDUCATION/TRAINING PROGRAM

## 2017-10-03 RX ORDER — ADHESIVE BANDAGE
30 BANDAGE TOPICAL ONCE
Status: COMPLETED | OUTPATIENT
Start: 2017-10-03 | End: 2017-10-03

## 2017-10-03 RX ORDER — AMOXICILLIN 250 MG
2 CAPSULE ORAL 2 TIMES DAILY
Status: DISCONTINUED | OUTPATIENT
Start: 2017-10-03 | End: 2017-10-03 | Stop reason: HOSPADM

## 2017-10-03 RX ADMIN — GABAPENTIN 300 MG: 300 CAPSULE ORAL at 09:10

## 2017-10-03 RX ADMIN — DIPHENHYDRAMINE HYDROCHLORIDE 25 MG: 50 INJECTION, SOLUTION INTRAMUSCULAR; INTRAVENOUS at 04:10

## 2017-10-03 RX ADMIN — POLYETHYLENE GLYCOL 3350 17 G: 17 POWDER, FOR SOLUTION ORAL at 09:10

## 2017-10-03 RX ADMIN — OXYCODONE HYDROCHLORIDE 10 MG: 5 TABLET ORAL at 05:10

## 2017-10-03 RX ADMIN — ENOXAPARIN SODIUM 40 MG: 100 INJECTION SUBCUTANEOUS at 01:10

## 2017-10-03 RX ADMIN — DIPHENHYDRAMINE HYDROCHLORIDE 25 MG: 50 INJECTION, SOLUTION INTRAMUSCULAR; INTRAVENOUS at 10:10

## 2017-10-03 RX ADMIN — DEXTROSE MONOHYDRATE AND SODIUM CHLORIDE: 5; .45 INJECTION, SOLUTION INTRAVENOUS at 04:10

## 2017-10-03 RX ADMIN — OXYCODONE HYDROCHLORIDE 10 MG: 5 TABLET ORAL at 12:10

## 2017-10-03 RX ADMIN — FLUOXETINE 40 MG: 20 CAPSULE ORAL at 09:10

## 2017-10-03 RX ADMIN — BACLOFEN 10 MG: 10 TABLET ORAL at 09:10

## 2017-10-03 RX ADMIN — CARBAMAZEPINE 400 MG: 200 TABLET ORAL at 09:10

## 2017-10-03 RX ADMIN — STANDARDIZED SENNA CONCENTRATE AND DOCUSATE SODIUM 2 TABLET: 8.6; 5 TABLET, FILM COATED ORAL at 09:10

## 2017-10-03 RX ADMIN — HYDROMORPHONE HYDROCHLORIDE 1 MG: 1 INJECTION, SOLUTION INTRAMUSCULAR; INTRAVENOUS; SUBCUTANEOUS at 10:10

## 2017-10-03 RX ADMIN — HYDRALAZINE HYDROCHLORIDE 100 MG: 50 TABLET ORAL at 05:10

## 2017-10-03 RX ADMIN — MAGNESIUM HYDROXIDE 2400 MG: 400 SUSPENSION ORAL at 10:10

## 2017-10-03 RX ADMIN — AMLODIPINE BESYLATE 10 MG: 10 TABLET ORAL at 09:10

## 2017-10-03 RX ADMIN — HYDROMORPHONE HYDROCHLORIDE 1 MG: 1 INJECTION, SOLUTION INTRAMUSCULAR; INTRAVENOUS; SUBCUTANEOUS at 04:10

## 2017-10-03 RX ADMIN — HYDROXYUREA 500 MG: 500 CAPSULE ORAL at 09:10

## 2017-10-03 NOTE — SUBJECTIVE & OBJECTIVE
Interval History - NAEON required all of her PRNs. Sleeping comfortable. Patient desired transition off IV pain medications, desires BM, will assist.     Review of Systems   Constitutional: Positive for activity change and fatigue. Negative for appetite change, chills and fever.   HENT: Negative for nosebleeds, postnasal drip, rhinorrhea, sinus pain and sore throat.    Eyes: Negative for visual disturbance.   Respiratory: Positive for cough and shortness of breath. Negative for chest tightness and wheezing.    Cardiovascular: Positive for leg swelling. Negative for chest pain and palpitations.   Gastrointestinal: Negative for abdominal distention, abdominal pain, constipation, diarrhea and vomiting.   Endocrine: Negative for polyphagia and polyuria.   Genitourinary: Negative for difficulty urinating, dysuria and hematuria.   Musculoskeletal: Positive for arthralgias, back pain and myalgias. Negative for neck pain.   Neurological: Positive for weakness. Negative for seizures and headaches.   Hematological: Negative for adenopathy. Does not bruise/bleed easily.   Psychiatric/Behavioral: The patient is not nervous/anxious.      Objective:     Vital Signs (Most Recent):  Temp: 98.9 °F (37.2 °C) (10/03/17 1144)  Pulse: 102 (10/03/17 1144)  Resp: 19 (10/03/17 1144)  BP: 127/65 (10/03/17 1144)  SpO2: (!) 91 % (10/03/17 1144) Vital Signs (24h Range):  Temp:  [98.4 °F (36.9 °C)-99.3 °F (37.4 °C)] 98.9 °F (37.2 °C)  Pulse:  [] 102  Resp:  [16-20] 19  SpO2:  [91 %-99 %] 91 %  BP: (127-163)/(65-90) 127/65     Weight: 136.1 kg (300 lb)  Body mass index is 54.87 kg/m².    Physical Exam   Constitutional: She is oriented to person, place, and time. She appears well-developed and well-nourished. No distress.   Morbidly obese   HENT:   Head: Normocephalic and atraumatic.   Right Ear: External ear normal.   Left Ear: External ear normal.   Mouth/Throat: Oropharynx is clear and moist. Mucous membranes are not dry. No oral  lesions. No oropharyngeal exudate.   Eyes: Conjunctivae and EOM are normal. Pupils are equal, round, and reactive to light. Right eye exhibits no discharge. No scleral icterus.   Neck: Normal range of motion. Muscular tenderness present.   Cardiovascular: Normal rate, regular rhythm, S1 normal, S2 normal, normal heart sounds and intact distal pulses.  Exam reveals no gallop and no friction rub.    No murmur heard.  Pulses:       Radial pulses are 2+ on the right side, and 2+ on the left side.        Dorsalis pedis pulses are 2+ on the right side, and 2+ on the left side.   + 1 pitting edema to mid shin R>L   Pulmonary/Chest: Effort normal. No stridor. She has no wheezes. She exhibits no tenderness.   Abdominal: Soft. Bowel sounds are normal. She exhibits no mass. There is no tenderness. There is no guarding. No hernia.   Musculoskeletal: Normal range of motion. She exhibits no edema or tenderness.        Left shoulder: She exhibits no effusion and no crepitus.   Lymphadenopathy:     She has no cervical adenopathy.   Neurological: She is alert and oriented to person, place, and time. She displays no tremor. No cranial nerve deficit or sensory deficit. GCS eye subscore is 4. GCS verbal subscore is 5. GCS motor subscore is 6.   Skin: Skin is warm, dry and intact. No rash noted. She is not diaphoretic.   Psychiatric: She has a normal mood and affect.        Significant Labs:   H/H stable, no acute changes    Significant Imaging: CXR: I have reviewed all pertinent results/findings within the past 24 hours and my personal findings are:  prominent interstitial markings but no signs of acute cardiopulmonary process. Interval chest X ray without any changes to suggest acute chest syndrome.

## 2017-10-03 NOTE — DISCHARGE SUMMARY
[facility] Hospital Medicine  Discharge Summary      Patient Name: Nazanin Malone  MRN: [id number]  Admission Date: 9/29/2017  Hospital Length of Stay: 4 days  Discharge Date and Time:  10/03/2017 2:25 PM  Attending Physician: Panfilo Steinberg MD   Discharging Provider: Balta Forbes MD  Primary Care Provider: Primary Doctor [facility] Medicine Team: [facility] MED 2 Balta Forbes MD    HPI:   Ms. Malone is a 37 year old female with a PMHx of sickle cell, beta thalassemia, asthma, HTN and trigeminal neuralgia who presents today with an acute pain crisis secondary to sickle cell/beta thalassemia which may have been triggered by recent asthma exacerbation. Her pain crisis began yesterday morning at which point she called her MD for a percocet rx. She notes that the pain began in her left shoulder with left neck and left arm pain and weakness, it is a 10/10, deep pain and has been constant since it began. She notes that she has been having more crises since she moved back to [place] recently and has been having them monthly in association with her menses. She was able to fill the prescription at 5PM yesterday and began taken 2-3 (Percocet 10mg tablets) every 4 hours from 6 pm until 6am this morning. The pain relief was not sufficient and barely took the edge off. She notes that this is similar to her recurrent crises. She did not feel that there was anything which made her pain worse although she does believe that her recent asthma exacerbation which she was seen in the [facility] ED on 9/25 may have led to this episode as well. In the ED on 9/25 she was given a Duo-Neb treatment, a dose of prednisone (60mg) and then discharged on Prednisone 40mg BID x 5 days. In her history of this asthma episode she noted that her symptoms began around 9/17 and she took some left over prednisone 40mg x 4 days until ~ 9/21. Up to presentation on 9/25 she was having increasing difficulty managing with her home  nebulizer and rescue inhaler. Her last hospitalization at [facility] occurred in April 2017 and she was found to have Acute Chest Syndrome with multifocal PNA.     * No surgery found *      Indwelling Lines/Drains at time of discharge:   Lines/Drains/Airways          No matching active lines, drains, or airways        Hospital Course:   9/29/2017 ED course - seen by ED JAI/MD and given 1L bolus, Norvasc 10, Hydralazine 100, Benadryl 25, Dilautid 1mg x 3 (last dose 1005) and started on D5 1/2NS infusion  CXR with chronic prominent interstitial markings    9/30 - pain well controlled, titrated Hydromorphone requirements to 1mg Q4 with .5mg PRN Q8 for severe and Oxy IR 10mg Q8 for moderate. Oxygen saturations stable, interval CXR without change, H/H stable    10/01 - patient complaining of pain but not utilizing all PRN medications - no change to pain medication ATT. Oxygen saturations stable, uses N/C for comfort. H/H stable    10/2 assess for pain severity and prime up for discharge  10/3 off all IV medications - will assess O2 sats with ambulation, O2 sats stable during ambulation     The patient was seen and examined on day of discharge. Labs and vital signs were reviewed. Physical exam revealed the following:    General: well developed, well nourished, appears to be in NAD, obese  Eyes: conjunctivae/corneas clear. PERRL. EOMI  Neck: supple, symmetrical, trachea midline, no JVD  Cardiovascular: Heart: regular rate and rhythm, S1, S2 normal, no murmur, click, rub or gallop.  Lungs: clear to auscultation bilaterally and normal respiratory effort  Chest Wall: no tenderness  Abdomen/Rectal: Abdomen: Non distended. + BS. No masses. No TTP. No rebound or guarding. Negative Medellin's sign. Rectal: not examined  Extremities: +1 edema to midshin bilaterally, no redness or tenderness in the calves or thighs. Pulses: 2+ and symmetric  Skin: Skin color, texture, turgor normal. No rashes or lesions  Musculoskeletal: full range of  motion of joints  Lymph Nodes: No cervical or supraclavicular adenopathy  Psych/Behavioral: Normal. Alert and oriented, appropriate affect.      No consults placed    Significant Diagnostic Studies: Labs:   BMP: No results for input(s): GLU, NA, K, CL, CO2, BUN, CREATININE, CALCIUM, MG in the last 48 hours., CMP No results for input(s): NA, K, CL, CO2, GLU, BUN, CREATININE, CALCIUM, PROT, ALBUMIN, BILITOT, ALKPHOS, AST, ALT, ANIONGAP, ESTGFRAFRICA, EGFRNONAA in the last 48 hours., CBC   Recent Labs  Lab 10/02/17  1119   WBC 8.87   HGB 10.3*   HCT 31.2*      , INR No results found for: INR, PROTIME, Lipid Panel No results found for: CHOL, HDL, LDLCALC, TRIG, CHOLHDL, Troponin   Recent Labs  Lab 09/29/17  0739   TROPONINI <0.006   , A1C: No results for input(s): HGBA1C in the last 4320 hours. and All labs within the past 24 hours have been reviewed      Pending Diagnostic Studies:     None        Final Active Diagnoses:    Diagnosis Date Noted POA    PRINCIPAL PROBLEM:  Sickle-cell disease with pain [D57.819] 04/16/2017 Yes    Snoring [R06.83] 10/03/2017 Unknown    Morbid obesity due to excess calories [E66.01] 04/25/2017 Yes    Benign essential HTN [I10] 04/16/2017 Yes    Opioid dependence [F11.20] 04/16/2017 Yes      Problems Resolved During this Admission:    Diagnosis Date Noted Date Resolved POA    Sickle cell pain crisis [D57.00] 09/29/2017 10/03/2017 Yes    Asthma exacerbation [J45.901] 04/25/2017 10/03/2017 Yes      Snoring    STOP-BANG 4 with BMI and NECK SIZE  - outpatient sleep assessment for SHREE with sleep medicine placed in discharge              Discharged Condition: good    Disposition: Home or Self Care    Follow Up:  Follow-up Information     Val Haynes MD In 3 days.    Specialty:  Internal Medicine  Contact information:  [address]  [facility] [address]  [phone]                 Patient Instructions:     Ambulatory consult to Sleep Disorders   Referral Priority:  Routine Referral Type: Consultation   Number of Visits Requested: 1      Diet general       Medications:  Reconciled Home Medications:   Current Discharge Medication List      CONTINUE these medications which have NOT CHANGED    Details   albuterol (ACCUNEB) 0.63 mg/3 mL Nebu Take 0.63 mg by nebulization every 6 (six) hours as needed (for wheezing/shortness of breath). Rescue       albuterol (VENTOLIN HFA) 90 mcg/actuation inhaler Inhale 2 puffs into the lungs every 6 (six) hours as needed for Wheezing. Rescue      amlodipine (NORVASC) 10 MG tablet Take 1 tablet (10 mg total) by mouth once daily.  Qty: 30 tablet, Refills: 3    Associated Diagnoses: Essential hypertension      baclofen (LIORESAL) 10 MG tablet Take 1 tablet (10 mg total) by mouth 2 (two) times daily.  Qty: 60 tablet, Refills: 2    Associated Diagnoses: Sickle cell beta thalassemia      budesonide-formoterol 160-4.5 mcg (SYMBICORT) 160-4.5 mcg/actuation HFAA Inhale 2 puffs into the lungs every 12 (twelve) hours. Controller  Qty: 10.2 g, Refills: 2      carbamazepine (TEGRETOL) 200 mg tablet Take 2 tablets (400 mg total) by mouth 2 (two) times daily.  Qty: 60 tablet, Refills: 0    Associated Diagnoses: Sickle cell beta thalassemia      ergocalciferol (VITAMIN D2) 50,000 unit Cap Take 1 capsule (50,000 Units total) by mouth every 7 days.  Qty: 12 capsule, Refills: 3    Associated Diagnoses: Sickle cell beta thalassemia      fluoxetine (PROZAC) 40 MG capsule Take 40 mg by mouth once daily.      gabapentin (NEURONTIN) 300 MG capsule Take 1 capsule (300 mg total) by mouth 2 (two) times daily.  Qty: 60 capsule, Refills: 5    Associated Diagnoses: Sickle cell beta thalassemia      hydrALAZINE (APRESOLINE) 100 MG tablet Take 1 tablet (100 mg total) by mouth every 8 (eight) hours.  Qty: 90 tablet, Refills: 3      hydroxyurea (HYDREA) 500 mg Cap Take 1 capsule (500 mg total) by mouth once daily.  Qty: 60 capsule, Refills: 2    Associated Diagnoses: Sickle cell  beta thalassemia      ondansetron (ZOFRAN-ODT) 8 MG TbDL Take 1 tablet (8 mg total) by mouth every 6 (six) hours as needed.  Qty: 25 tablet, Refills: 0    Associated Diagnoses: Nausea      oxycodone-acetaminophen (PERCOCET)  mg per tablet Take 1 tablet by mouth every 4 (four) hours as needed for Pain.  Qty: 120 tablet, Refills: 0    Associated Diagnoses: Sickle cell beta thalassemia      senna-docusate 8.6-50 mg (PERICOLACE) 8.6-50 mg per tablet Take 1 tablet by mouth 2 (two) times daily.      carvedilol (COREG) 25 MG tablet Take 1 tablet (25 mg total) by mouth 2 (two) times daily.  Qty: 60 tablet, Refills: 3           Time spent on the discharge of patient: 30 minutes      Balta Forbes MD PGY-1  Department of Hospital Medicine  [facility]

## 2017-10-03 NOTE — PROGRESS NOTES
[facility] Hospital Medicine  Progress Note    Patient Name: Nazanin Malone  MRN: [id number]  Patient Class: IP- Inpatient   Admission Date: 9/29/2017  Length of Stay: 3 days  Attending Physician: Lorin Clark MD  Primary Care Provider: Primary Doctor [facility] Medicine Team: [facility] MED 2 [address] MD    Subjective:     Principal Problem:Sickle-cell disease with pain    HPI:  Ms. Malone is a 37 year old female with a PMHx of sickle cell, beta thalassemia, asthma, HTN and trigeminal neuralgia who presents today with an acute pain crisis secondary to sickle cell/beta thalassemia which may have been triggered by recent asthma exacerbation. Her pain crisis began yesterday morning at which point she called her MD for a percocet rx. She notes that the pain began in her left shoulder with left neck and left arm pain and weakness, it is a 10/10, deep pain and has been constant since it began. She notes that she has been having more crises since she moved back to [place] recently and has been having them monthly in association with her menses. She was able to fill the prescription at 5PM yesterday and began taken 2-3 (Percocet 10mg tablets) every 4 hours from 6 pm until 6am this morning. The pain relief was not sufficient and barely took the edge off. She notes that this is similar to her recurrent crises. She did not feel that there was anything which made her pain worse although she does believe that her recent asthma exacerbation which she was seen in the [facility] ED on 9/25 may have led to this episode as well. In the ED on 9/25 she was given a Duo-Neb treatment, a dose of prednisone (60mg) and then discharged on Prednisone 40mg BID x 5 days. In her history of this asthma episode she noted that her symptoms began around 9/17 and she took some left over prednisone 40mg x 4 days until ~ 9/21. Up to presentation on 9/25 she was having increasing difficulty managing with her home nebulizer  and rescue inhaler. Her last hospitalization at [facility] occurred in April 2017 and she was found to have Acute Chest Syndrome with multifocal PNA.     Hospital Course:  9/29/2017 ED course - seen by ED JAI/MD and given 1L bolus, Norvasc 10, Hydralazine 100, Benadryl 25, Dilautid 1mg x 3 (last dose 1005) and started on D5 1/2NS infusion  CXR with chronic prominent interstitial markings    9/30 - pain well controlled, titrated Hydromorphone requirements to 1mg Q4 with .5mg PRN Q8 for severe and Oxy IR 10mg Q8 for moderate. Oxygen saturations stable, interval CXR without change, H/H stable    10/01 - patient complaining of pain but not utilizing all PRN medications - no change to pain medication ATT. Oxygen saturations stable, uses N/C for comfort. H/H stable    10/2 assess for pain severity and prime up for discharge    Interval History :  NAEON. Pt reports pain in R arm and neck, but pain better tolerated now. Significantly better than on arrival. Pt wants her breathing treatments scheduled every 4 hours rather than PRN.     Review of Systems   Constitutional: Positive for activity change and fatigue. Negative for appetite change, chills and fever.   HENT: Negative for nosebleeds, postnasal drip, rhinorrhea, sinus pain and sore throat.    Eyes: Negative for visual disturbance.   Respiratory: Positive for cough and shortness of breath. Negative for chest tightness and wheezing.    Cardiovascular: Positive for leg swelling. Negative for chest pain and palpitations.   Gastrointestinal: Negative for abdominal distention, abdominal pain, constipation, diarrhea and vomiting.   Endocrine: Negative for polyphagia and polyuria.   Genitourinary: Negative for difficulty urinating, dysuria and hematuria.   Musculoskeletal: Positive for arthralgias, back pain and myalgias. Negative for neck pain.   Neurological: Positive for weakness. Negative for seizures and headaches.   Hematological: Negative for adenopathy. Does not  bruise/bleed easily.   Psychiatric/Behavioral: The patient is not nervous/anxious.      Objective:     Vital Signs (Most Recent):  Temp: 98.9 °F (37.2 °C) (10/02/17 1558)  Pulse: (!) 112 (10/02/17 1715)  Resp: 18 (10/02/17 1715)  BP: (!) 163/78 (10/02/17 1558)  SpO2: 96 % (10/02/17 1715) Vital Signs (24h Range):  Temp:  [98.8 °F (37.1 °C)-100 °F (37.8 °C)] 98.9 °F (37.2 °C)  Pulse:  [] 112  Resp:  [16-20] 18  SpO2:  [94 %-97 %] 96 %  BP: (101-163)/(56-92) 163/78     Weight: 136.1 kg (300 lb)  Body mass index is 54.87 kg/m².    Physical Exam   Constitutional: She is oriented to person, place, and time. She appears well-developed and well-nourished. No distress.   Morbidly obese   HENT:   Head: Normocephalic and atraumatic.   Right Ear: External ear normal.   Left Ear: External ear normal.   Mouth/Throat: Oropharynx is clear and moist. Mucous membranes are not dry. No oral lesions. No oropharyngeal exudate.   Eyes: Conjunctivae and EOM are normal. Pupils are equal, round, and reactive to light. Right eye exhibits no discharge. No scleral icterus.   Neck: Normal range of motion. Muscular tenderness present.   Cardiovascular: Normal rate, regular rhythm, S1 normal, S2 normal, normal heart sounds and intact distal pulses.  Exam reveals no gallop and no friction rub.    No murmur heard.  Pulses:       Radial pulses are 2+ on the right side, and 2+ on the left side.        Dorsalis pedis pulses are 2+ on the right side, and 2+ on the left side.   + 1 pitting edema to mid shin R>L   Pulmonary/Chest: Effort normal. No stridor. She has no wheezes. She exhibits no tenderness.   Abdominal: Soft. Bowel sounds are normal. She exhibits no mass. There is no tenderness. There is no guarding. No hernia.   Musculoskeletal: Normal range of motion. She exhibits no edema or tenderness.        Left shoulder: She exhibits no effusion and no crepitus.   Lymphadenopathy:     She has no cervical adenopathy.   Neurological: She is alert  and oriented to person, place, and time. She displays no tremor. No cranial nerve deficit or sensory deficit. GCS eye subscore is 4. GCS verbal subscore is 5. GCS motor subscore is 6.   Skin: Skin is warm, dry and intact. No rash noted. She is not diaphoretic.   Psychiatric: She has a normal mood and affect.        Significant Labs:   H/H stable, no acute changes      Recent Results (from the past 24 hour(s))   CBC auto differential    Collection Time: 10/02/17 11:19 AM   Result Value Ref Range    WBC 8.87 3.90 - 12.70 K/uL    RBC 4.25 4.00 - 5.40 M/uL    Hemoglobin 10.3 (L) 12.0 - 16.0 g/dL    Hematocrit 31.2 (L) 37.0 - 48.5 %    MCV 73 (L) 82 - 98 fL    MCH 24.2 (L) 27.0 - 31.0 pg    MCHC 33.0 32.0 - 36.0 g/dL    RDW 16.4 (H) 11.5 - 14.5 %    Platelets 165 150 - 350 K/uL    MPV 8.7 (L) 9.2 - 12.9 fL    Gran # 5.0 1.8 - 7.7 K/uL    Lymph # 2.6 1.0 - 4.8 K/uL    Mono # 0.8 0.3 - 1.0 K/uL    Eos # 0.5 0.0 - 0.5 K/uL    Baso # 0.01 0.00 - 0.20 K/uL    Gran% 56.0 38.0 - 73.0 %    Lymph% 29.7 18.0 - 48.0 %    Mono% 9.0 4.0 - 15.0 %    Eosinophil% 5.1 0.0 - 8.0 %    Basophil% 0.1 0.0 - 1.9 %    Differential Method Automated              Significant Imaging:   CXR: I have reviewed all pertinent results/findings within the past 24 hours and my personal findings are:  prominent interstitial markings but no signs of acute cardiopulmonary process. Interval chest X ray without any changes to suggest acute chest syndrome.     Assessment/Plan:      * Sickle-cell disease with pain    - switching Dilaudid to 1mg Q6H PRN and d/c scheduled  - Oxy IR 10mg q6h for moderate pain   - continue IVF        Sickle cell pain crisis    Hb 11.5 with Retic 4.6 interval H/H stable, low haptoglobin < 10 and elevated LDH  Daily CBC, BMP  Tylenol level 9.0 on admit              Asthma exacerbation    Duonebs q4h  stable            Opioid dependence    Concern for opioid dependence behavior  De-escalate on pain med and discharge with no opioid  refills          Benign essential HTN    Continue home dosage of:  Amlodipine  Hydralazine  - hold hydralazine with SBP <120              VTE Risk Mitigation         Ordered     enoxaparin injection 40 mg  Every 12 hours (non-standard times)     Route:  Subcutaneous        09/29/17 1224     Medium Risk of VTE  Once      09/29/17 1224     Place JOSE LUIS hose  Until discontinued      09/29/17 1224     Place sequential compression device  Until discontinued      09/29/17 1224              Jesu Street MD  Department of Hospital Medicine   [facility]

## 2017-10-03 NOTE — SUBJECTIVE & OBJECTIVE
Interval History :  NAEON. Pt reports pain in R arm and neck, but pain better tolerated now. Significantly better than on arrival. Pt wants her breathing treatments scheduled every 4 hours rather than PRN.     Review of Systems   Constitutional: Positive for activity change and fatigue. Negative for appetite change, chills and fever.   HENT: Negative for nosebleeds, postnasal drip, rhinorrhea, sinus pain and sore throat.    Eyes: Negative for visual disturbance.   Respiratory: Positive for cough and shortness of breath. Negative for chest tightness and wheezing.    Cardiovascular: Positive for leg swelling. Negative for chest pain and palpitations.   Gastrointestinal: Negative for abdominal distention, abdominal pain, constipation, diarrhea and vomiting.   Endocrine: Negative for polyphagia and polyuria.   Genitourinary: Negative for difficulty urinating, dysuria and hematuria.   Musculoskeletal: Positive for arthralgias, back pain and myalgias. Negative for neck pain.   Neurological: Positive for weakness. Negative for seizures and headaches.   Hematological: Negative for adenopathy. Does not bruise/bleed easily.   Psychiatric/Behavioral: The patient is not nervous/anxious.      Objective:     Vital Signs (Most Recent):  Temp: 98.9 °F (37.2 °C) (10/02/17 1558)  Pulse: (!) 112 (10/02/17 1715)  Resp: 18 (10/02/17 1715)  BP: (!) 163/78 (10/02/17 1558)  SpO2: 96 % (10/02/17 1715) Vital Signs (24h Range):  Temp:  [98.8 °F (37.1 °C)-100 °F (37.8 °C)] 98.9 °F (37.2 °C)  Pulse:  [] 112  Resp:  [16-20] 18  SpO2:  [94 %-97 %] 96 %  BP: (101-163)/(56-92) 163/78     Weight: 136.1 kg (300 lb)  Body mass index is 54.87 kg/m².    Physical Exam   Constitutional: She is oriented to person, place, and time. She appears well-developed and well-nourished. No distress.   Morbidly obese   HENT:   Head: Normocephalic and atraumatic.   Right Ear: External ear normal.   Left Ear: External ear normal.   Mouth/Throat: Oropharynx is  clear and moist. Mucous membranes are not dry. No oral lesions. No oropharyngeal exudate.   Eyes: Conjunctivae and EOM are normal. Pupils are equal, round, and reactive to light. Right eye exhibits no discharge. No scleral icterus.   Neck: Normal range of motion. Muscular tenderness present.   Cardiovascular: Normal rate, regular rhythm, S1 normal, S2 normal, normal heart sounds and intact distal pulses.  Exam reveals no gallop and no friction rub.    No murmur heard.  Pulses:       Radial pulses are 2+ on the right side, and 2+ on the left side.        Dorsalis pedis pulses are 2+ on the right side, and 2+ on the left side.   + 1 pitting edema to mid shin R>L   Pulmonary/Chest: Effort normal. No stridor. She has no wheezes. She exhibits no tenderness.   Abdominal: Soft. Bowel sounds are normal. She exhibits no mass. There is no tenderness. There is no guarding. No hernia.   Musculoskeletal: Normal range of motion. She exhibits no edema or tenderness.        Left shoulder: She exhibits no effusion and no crepitus.   Lymphadenopathy:     She has no cervical adenopathy.   Neurological: She is alert and oriented to person, place, and time. She displays no tremor. No cranial nerve deficit or sensory deficit. GCS eye subscore is 4. GCS verbal subscore is 5. GCS motor subscore is 6.   Skin: Skin is warm, dry and intact. No rash noted. She is not diaphoretic.   Psychiatric: She has a normal mood and affect.        Significant Labs:   H/H stable, no acute changes      Recent Results (from the past 24 hour(s))   CBC auto differential    Collection Time: 10/02/17 11:19 AM   Result Value Ref Range    WBC 8.87 3.90 - 12.70 K/uL    RBC 4.25 4.00 - 5.40 M/uL    Hemoglobin 10.3 (L) 12.0 - 16.0 g/dL    Hematocrit 31.2 (L) 37.0 - 48.5 %    MCV 73 (L) 82 - 98 fL    MCH 24.2 (L) 27.0 - 31.0 pg    MCHC 33.0 32.0 - 36.0 g/dL    RDW 16.4 (H) 11.5 - 14.5 %    Platelets 165 150 - 350 K/uL    MPV 8.7 (L) 9.2 - 12.9 fL    Gran # 5.0 1.8 -  7.7 K/uL    Lymph # 2.6 1.0 - 4.8 K/uL    Mono # 0.8 0.3 - 1.0 K/uL    Eos # 0.5 0.0 - 0.5 K/uL    Baso # 0.01 0.00 - 0.20 K/uL    Gran% 56.0 38.0 - 73.0 %    Lymph% 29.7 18.0 - 48.0 %    Mono% 9.0 4.0 - 15.0 %    Eosinophil% 5.1 0.0 - 8.0 %    Basophil% 0.1 0.0 - 1.9 %    Differential Method Automated              Significant Imaging:   CXR: I have reviewed all pertinent results/findings within the past 24 hours and my personal findings are:  prominent interstitial markings but no signs of acute cardiopulmonary process. Interval chest X ray without any changes to suggest acute chest syndrome.

## 2017-10-03 NOTE — PLAN OF CARE
Problem: Patient Care Overview  Goal: Plan of Care Review  Outcome: Ongoing (interventions implemented as appropriate)  Remains AOX4, VSS, NAD. C/O pain  7-8/10 throughout shift. PRN medications as well as scheduled meds given. Pt slept well throughout the shift. Continuous fluids @ 100mL/hr remain. PT ambulatory to restroom. Remains free from falls. Bed locked and in lowest position. WCTM.

## 2017-10-03 NOTE — PLAN OF CARE
[id number] - [place], [place] - [address]  [address]  [facility] [address]  Phone: [phone] Fax: [phone]    Payor: MEDICAID / Plan: [employer] / Product Type: Managed Medicaid /         10/03/17 1255   Discharge Assessment   Assessment Type Discharge Planning Assessment   Confirmed/corrected address and phone number on facesheet? Yes   Assessment information obtained from? Patient   Expected Length of Stay (days) 3   Communicated expected length of stay with patient/caregiver yes   Prior to hospitilization cognitive status: Alert/Oriented   Prior to hospitalization functional status: Independent   Current cognitive status: Alert/Oriented   Current Functional Status: Independent   Lives With parent(s);child(jayce), adult   Able to Return to Prior Arrangements yes   Is patient able to care for self after discharge? Yes   Patient's perception of discharge disposition home or selfcare   Readmission Within The Last 30 Days no previous admission in last 30 days   Patient currently being followed by outpatient case management? No   Patient currently receives any other outside agency services? No   Equipment Currently Used at Home none   Do you have any problems affording any of your prescribed medications? No   Is the patient taking medications as prescribed? yes   Does the patient have transportation home? Yes   Transportation Available family or friend will provide   Does the patient receive services at the [facility]? No   Discharge Plan A Home with family   Patient/Family In Agreement With Plan yes

## 2017-10-03 NOTE — ASSESSMENT & PLAN NOTE
STOP-BANG 4 with BMI and NECK SIZE  - outpatient sleep assessment for SHREE with sleep medicine placed in discharge

## 2017-10-03 NOTE — ASSESSMENT & PLAN NOTE
Hb 11.5 with Retic 4.6 interval H/H stable, low haptoglobin < 10 and elevated LDH  Daily CBC, BMP  Tylenol level 9.0 on admit  Oxycodone IR 10 mg Q6H

## 2017-10-03 NOTE — PLAN OF CARE
Future Appointments  Date Time Provider Department Center   10/16/2017 1:00 PM ASHLEY Alonso [facility] IMPRICL Vito Fide OSCARW   10/19/2017 8:45 AM LAB, HEMONC SAME DAY [facility] ABELARDO Dominguez   10/19/2017 9:40 AM Pee Montgomery MD [facility] BM SUNSHINE Dong Dominguez        10/03/17 1414   Final Note   Assessment Type Final Discharge Note   Discharge Disposition Home   What phone number can be called within the next 1-3 days to see how you are doing after discharge? [phone]   Hospital Follow Up  Appt(s) scheduled? Yes   Right Care Referral Info   Post Acute Recommendation No Care

## 2017-10-03 NOTE — PROGRESS NOTES
[facility] Hospital Medicine  Progress Note    Patient Name: Nazanin Malone  MRN: [id number]  Patient Class: IP- Inpatient   Admission Date: 9/29/2017  Length of Stay: 4 days  Attending Physician: Panfilo Steinberg MD  Primary Care Provider: Primary Doctor [facility] Medicine Team: [facility] MED 2 Balta Forbes MD    Subjective:     Principal Problem:Sickle-cell disease with pain    HPI:  Ms. Malone is a 37 year old female with a PMHx of sickle cell, beta thalassemia, asthma, HTN and trigeminal neuralgia who presents today with an acute pain crisis secondary to sickle cell/beta thalassemia which may have been triggered by recent asthma exacerbation. Her pain crisis began yesterday morning at which point she called her MD for a percocet rx. She notes that the pain began in her left shoulder with left neck and left arm pain and weakness, it is a 10/10, deep pain and has been constant since it began. She notes that she has been having more crises since she moved back to [place] recently and has been having them monthly in association with her menses. She was able to fill the prescription at 5PM yesterday and began taken 2-3 (Percocet 10mg tablets) every 4 hours from 6 pm until 6am this morning. The pain relief was not sufficient and barely took the edge off. She notes that this is similar to her recurrent crises. She did not feel that there was anything which made her pain worse although she does believe that her recent asthma exacerbation which she was seen in the [facility] ED on 9/25 may have led to this episode as well. In the ED on 9/25 she was given a Duo-Neb treatment, a dose of prednisone (60mg) and then discharged on Prednisone 40mg BID x 5 days. In her history of this asthma episode she noted that her symptoms began around 9/17 and she took some left over prednisone 40mg x 4 days until ~ 9/21. Up to presentation on 9/25 she was having increasing difficulty managing with her home  nebulizer and rescue inhaler. Her last hospitalization at [facility] occurred in April 2017 and she was found to have Acute Chest Syndrome with multifocal PNA.     Hospital Course:  9/29/2017 ED course - seen by ED JAI/MD and given 1L bolus, Norvasc 10, Hydralazine 100, Benadryl 25, Dilautid 1mg x 3 (last dose 1005) and started on D5 1/2NS infusion  CXR with chronic prominent interstitial markings    9/30 - pain well controlled, titrated Hydromorphone requirements to 1mg Q4 with .5mg PRN Q8 for severe and Oxy IR 10mg Q8 for moderate. Oxygen saturations stable, interval CXR without change, H/H stable    10/01 - patient complaining of pain but not utilizing all PRN medications - no change to pain medication ATT. Oxygen saturations stable, uses N/C for comfort. H/H stable    10/2 assess for pain severity and prime up for discharge  10/3 off all IV medications - will assess O2 sats with ambulation    Interval History - SANCHO required all of her PRNs. Sleeping comfortable. Patient desired transition off IV pain medications, desires BM, will assist.     Review of Systems   Constitutional: Positive for activity change and fatigue. Negative for appetite change, chills and fever.   HENT: Negative for nosebleeds, postnasal drip, rhinorrhea, sinus pain and sore throat.    Eyes: Negative for visual disturbance.   Respiratory: Positive for cough and shortness of breath. Negative for chest tightness and wheezing.    Cardiovascular: Positive for leg swelling. Negative for chest pain and palpitations.   Gastrointestinal: Negative for abdominal distention, abdominal pain, constipation, diarrhea and vomiting.   Endocrine: Negative for polyphagia and polyuria.   Genitourinary: Negative for difficulty urinating, dysuria and hematuria.   Musculoskeletal: Positive for arthralgias, back pain and myalgias. Negative for neck pain.   Neurological: Positive for weakness. Negative for seizures and headaches.   Hematological: Negative for  adenopathy. Does not bruise/bleed easily.   Psychiatric/Behavioral: The patient is not nervous/anxious.      Objective:     Vital Signs (Most Recent):  Temp: 98.9 °F (37.2 °C) (10/03/17 1144)  Pulse: 102 (10/03/17 1144)  Resp: 19 (10/03/17 1144)  BP: 127/65 (10/03/17 1144)  SpO2: (!) 91 % (10/03/17 1144) Vital Signs (24h Range):  Temp:  [98.4 °F (36.9 °C)-99.3 °F (37.4 °C)] 98.9 °F (37.2 °C)  Pulse:  [] 102  Resp:  [16-20] 19  SpO2:  [91 %-99 %] 91 %  BP: (127-163)/(65-90) 127/65     Weight: 136.1 kg (300 lb)  Body mass index is 54.87 kg/m².    Physical Exam   Constitutional: She is oriented to person, place, and time. She appears well-developed and well-nourished. No distress.   Morbidly obese   HENT:   Head: Normocephalic and atraumatic.   Right Ear: External ear normal.   Left Ear: External ear normal.   Mouth/Throat: Oropharynx is clear and moist. Mucous membranes are not dry. No oral lesions. No oropharyngeal exudate.   Eyes: Conjunctivae and EOM are normal. Pupils are equal, round, and reactive to light. Right eye exhibits no discharge. No scleral icterus.   Neck: Normal range of motion. Muscular tenderness present.   Cardiovascular: Normal rate, regular rhythm, S1 normal, S2 normal, normal heart sounds and intact distal pulses.  Exam reveals no gallop and no friction rub.    No murmur heard.  Pulses:       Radial pulses are 2+ on the right side, and 2+ on the left side.        Dorsalis pedis pulses are 2+ on the right side, and 2+ on the left side.   + 1 pitting edema to mid shin R>L   Pulmonary/Chest: Effort normal. No stridor. She has no wheezes. She exhibits no tenderness.   Abdominal: Soft. Bowel sounds are normal. She exhibits no mass. There is no tenderness. There is no guarding. No hernia.   Musculoskeletal: Normal range of motion. She exhibits no edema or tenderness.        Left shoulder: She exhibits no effusion and no crepitus.   Lymphadenopathy:     She has no cervical adenopathy.    Neurological: She is alert and oriented to person, place, and time. She displays no tremor. No cranial nerve deficit or sensory deficit. GCS eye subscore is 4. GCS verbal subscore is 5. GCS motor subscore is 6.   Skin: Skin is warm, dry and intact. No rash noted. She is not diaphoretic.   Psychiatric: She has a normal mood and affect.        Significant Labs:   H/H stable, no acute changes    Significant Imaging: CXR: I have reviewed all pertinent results/findings within the past 24 hours and my personal findings are:  prominent interstitial markings but no signs of acute cardiopulmonary process. Interval chest X ray without any changes to suggest acute chest syndrome.     Assessment/Plan:      * Sickle-cell disease with pain    - Oxy IR 10mg q6h for moderate pain           Sickle cell pain crisis    Hb 11.5 with Retic 4.6 interval H/H stable, low haptoglobin < 10 and elevated LDH  Daily CBC, BMP  Tylenol level 9.0 on admit  Oxycodone IR 10 mg Q6H              Snoring    STOP-BANG 4 with BMI and NECK SIZE  - outpatient sleep assessment for SHREE           Morbid obesity due to excess calories              Asthma exacerbation    Duonebs q4h  stable            Opioid dependence    Concern for opioid dependence behavior  De-escalate on pain med and discharge with no opioid refills          Benign essential HTN    Continue home dosage of:  Amlodipine  Hydralazine  - hold hydralazine with SBP <120              VTE Risk Mitigation         Ordered     enoxaparin injection 40 mg  Every 12 hours (non-standard times)     Route:  Subcutaneous        09/29/17 1224     Medium Risk of VTE  Once      09/29/17 1224     Place JOSE LUIS hose  Until discontinued      09/29/17 1224     Place sequential compression device  Until discontinued      09/29/17 1224              Balta Forbes MD  Department of Hospital Medicine   [facility]

## 2017-10-03 NOTE — PROGRESS NOTES
AVS d/c instructions given to patient, voice understanding. PIV d/blair with cath intact, gauze dressing applied. Waiting on ride to arrive.

## 2017-10-05 ENCOUNTER — PATIENT OUTREACH (OUTPATIENT)
Dept: ADMINISTRATIVE | Facility: CLINIC | Age: 37
End: 2017-10-05

## 2017-10-05 NOTE — PATIENT INSTRUCTIONS
"  Sickle Cell Anemia  You have sickle cell anemia, which is also called sickle cell disease. That means your red blood cells may lose their normal round shape and become shaped like a half-moon. These cells can't carry oxygen as well as normal, round blood cells. Sickle cell anemia runs in families, and commonly affects -Americans and certain other ethnic groups. Sickle cell anemia is a genetic disease you get from a mutated (changed) gene passed down from each parent. Sickle cell "trait" happens when you get one mutated gene from one of your parents. Sickle cell trait usually does not have symptoms and is not serious. Neither the disease nor the trait can be passed from person to person by coughing or touching. Sickle cell anemia can be controlled, but not cured. Most newborns are now tested for sickle cell disease at birth.  A sickle cell crisis happens when many sickle cells stick together and pile up in the blood vessels. During a sickle cell crisis, you may have severe pain in the chest, stomach, arms, and legs. The crisis can last for hours, or even days, and can happen several times a year.  Home care  Tips for taking care of yourself at home include:  · Watch for sores (ulcers) on your legs. These are caused by poor blood flow and are a sign that the sickle cell anemia is not under control.  · If you snore or sometimes stop breathing during sleep, be sure to tell your healthcare provider.  · Get treatment for any other medical condition, such as diabetes. This is important to avoid complications of sickle cell anemia.  · Get early prenatal care if you are pregnant or plan to get pregnant.  · If you plan to travel by air, go in pressurized aircraft only. Check with your healthcare provider about any needed safety steps if you must travel in a nonpressurized aircraft.  · Talk to your healthcare provider about what kind of pain medicine you should use.  · Drink plenty of water, especially during warm " weather.  · Get treated for any infection (cold, flu, skin infection) as soon as it happens.  · Wear warm clothes in cold weather or in air-conditioned rooms.  Lifestyle changes  Suggestions for changes include:  · Limit alcohol intake to no more than one drink per day.  · Stop smoking. Go to a stop-smoking program to improve your chances of quitting.  · Exercise regularly but not to the point that you become extremely tired. Drink plenty of fluids when you exercise.  · Avoid very strenuous activities, such as rough contact sports.  · Don't swim in cold water.   Follow-up care  Make a follow-up appointment as directed by our staff. Regular follow-up visits are very important.  When to call your healthcare provider  Call your healthcare provider right away if you have any of the following:  · Swollen hands or feet  · Sudden paleness in the skin or nail beds  · Yellow color of the skin or eyes (jaundice)  · Fever or signs of infection  · Swelling in the belly  · Sudden tiredness with no interest in what is going on  · Erection that won't go away  · Trouble hearing or seeing  · Weakness on one side of the body  · Sudden change in speech  · Headache  · Trouble breathing  · Joint, stomach, chest, or muscle pain  · Limping   Date Last Reviewed: 2/21/2016  © 6200-6025 [employer]. [address] [place] [address], [place] [address]. All rights reserved. This information is not intended as a substitute for professional medical care. Always follow your healthcare professional's instructions.

## 2017-10-05 NOTE — PROGRESS NOTES
[phone]  [phone]    C3 nurse attempted to contact patient. No answer. The following message was left for the patient to return the call:  Good morning  I am a nurse calling on behalf of [facility] from the [facility].  This is a Transitional Care Call for Nazanin Malone. When you have a moment please contact us at [phone] or [phone] Monday through Friday, between the hours of 8 am to 4 pm. We look forward to speaking with you. On behalf of [facility] have a nice day.    The patient has a scheduled HOSFU appointment with Pepper Rodriges on 10/16/17 @ 1000 hrs. Message sent to Physician staff.

## 2017-10-20 ENCOUNTER — OFFICE VISIT (OUTPATIENT)
Dept: HEMATOLOGY/ONCOLOGY | Facility: CLINIC | Age: 37
End: 2017-10-20
Payer: MEDICAID

## 2017-10-20 ENCOUNTER — LAB VISIT (OUTPATIENT)
Dept: LAB | Facility: HOSPITAL | Age: 37
End: 2017-10-20
Attending: INTERNAL MEDICINE
Payer: MEDICAID

## 2017-10-20 VITALS
TEMPERATURE: 100 F | DIASTOLIC BLOOD PRESSURE: 98 MMHG | BODY MASS INDEX: 51.91 KG/M2 | HEIGHT: 63 IN | HEART RATE: 98 BPM | SYSTOLIC BLOOD PRESSURE: 178 MMHG | WEIGHT: 293 LBS

## 2017-10-20 DIAGNOSIS — D57.40 SICKLE CELL BETA THALASSEMIA: ICD-10-CM

## 2017-10-20 DIAGNOSIS — I10 HYPERTENSION, UNSPECIFIED TYPE: Primary | ICD-10-CM

## 2017-10-20 DIAGNOSIS — D57.1 HB-SS DISEASE WITHOUT CRISIS: ICD-10-CM

## 2017-10-20 LAB
ABO + RH BLD: NORMAL
ALBUMIN SERPL BCP-MCNC: 3.4 G/DL
ALP SERPL-CCNC: 100 U/L
ALT SERPL W/O P-5'-P-CCNC: 10 U/L
ANION GAP SERPL CALC-SCNC: 10 MMOL/L
AST SERPL-CCNC: 11 U/L
BASOPHILS # BLD AUTO: 0.02 K/UL
BASOPHILS NFR BLD: 0.4 %
BILIRUB SERPL-MCNC: 0.7 MG/DL
BLD GP AB SCN CELLS X3 SERPL QL: NORMAL
BUN SERPL-MCNC: 6 MG/DL
CALCIUM SERPL-MCNC: 9.1 MG/DL
CHLORIDE SERPL-SCNC: 108 MMOL/L
CO2 SERPL-SCNC: 23 MMOL/L
CREAT SERPL-MCNC: 0.8 MG/DL
DIFFERENTIAL METHOD: ABNORMAL
EOSINOPHIL # BLD AUTO: 0.2 K/UL
EOSINOPHIL NFR BLD: 3.8 %
ERYTHROCYTE [DISTWIDTH] IN BLOOD BY AUTOMATED COUNT: 15.2 %
EST. GFR  (AFRICAN AMERICAN): >60 ML/MIN/1.73 M^2
EST. GFR  (NON AFRICAN AMERICAN): >60 ML/MIN/1.73 M^2
GLUCOSE SERPL-MCNC: 99 MG/DL
HCT VFR BLD AUTO: 35.8 %
HGB BLD-MCNC: 12.2 G/DL
IMM GRANULOCYTES # BLD AUTO: 0.01 K/UL
IMM GRANULOCYTES NFR BLD AUTO: 0.2 %
LYMPHOCYTES # BLD AUTO: 2.3 K/UL
LYMPHOCYTES NFR BLD: 49.1 %
MCH RBC QN AUTO: 24.4 PG
MCHC RBC AUTO-ENTMCNC: 34.1 G/DL
MCV RBC AUTO: 72 FL
MONOCYTES # BLD AUTO: 0.4 K/UL
MONOCYTES NFR BLD: 7.5 %
NEUTROPHILS # BLD AUTO: 1.9 K/UL
NEUTROPHILS NFR BLD: 39 %
NRBC BLD-RTO: 0 /100 WBC
PLATELET # BLD AUTO: 268 K/UL
PMV BLD AUTO: 9.8 FL
POTASSIUM SERPL-SCNC: 3.3 MMOL/L
PROT SERPL-MCNC: 7.6 G/DL
RBC # BLD AUTO: 4.99 M/UL
SODIUM SERPL-SCNC: 141 MMOL/L
WBC # BLD AUTO: 4.77 K/UL

## 2017-10-20 PROCEDURE — 86850 RBC ANTIBODY SCREEN: CPT

## 2017-10-20 PROCEDURE — 99999 PR PBB SHADOW E&M-EST. PATIENT-LVL II: CPT | Mod: PBBFAC,,, | Performed by: INTERNAL MEDICINE

## 2017-10-20 PROCEDURE — 99214 OFFICE O/P EST MOD 30 MIN: CPT | Mod: S$PBB,,, | Performed by: INTERNAL MEDICINE

## 2017-10-20 PROCEDURE — 99212 OFFICE O/P EST SF 10 MIN: CPT | Mod: PBBFAC,25 | Performed by: INTERNAL MEDICINE

## 2017-10-20 PROCEDURE — 86905 BLOOD TYPING RBC ANTIGENS: CPT

## 2017-10-20 PROCEDURE — 80053 COMPREHEN METABOLIC PANEL: CPT

## 2017-10-20 PROCEDURE — 85025 COMPLETE CBC W/AUTO DIFF WBC: CPT

## 2017-10-20 PROCEDURE — 36415 COLL VENOUS BLD VENIPUNCTURE: CPT

## 2017-10-20 PROCEDURE — 86900 BLOOD TYPING SEROLOGIC ABO: CPT

## 2017-10-20 RX ORDER — HYDROCHLOROTHIAZIDE 12.5 MG/1
12.5 CAPSULE ORAL DAILY
Qty: 30 CAPSULE | Refills: 11 | Status: SHIPPED | OUTPATIENT
Start: 2017-10-20 | End: 2018-10-20

## 2017-10-20 RX ORDER — CYCLOBENZAPRINE HCL 10 MG
10 TABLET ORAL 3 TIMES DAILY PRN
Qty: 45 TABLET | Refills: 0 | Status: SHIPPED | OUTPATIENT
Start: 2017-10-20 | End: 2017-10-30

## 2017-10-20 NOTE — PROGRESS NOTES
SECTION OF HEMATOLOGY AND BONE MARROW TRANSPLANT  Return  Patient Visit   10/22/2017  Referred by:  No ref. provider found  Referred for: sickle beta thal     CHIEF COMPLAINT:   No chief complaint on file.      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  She established care with me in 2017.    She  moved around country ([place], [place], [place], now back in [place] permanently) and had hematologists taking care of her in each of these respective cities.  Her disease is notable for 1-3 VOC a year requiring ED presentation and admission.  She has episode of acute chest syndrome as young girl.  States she had subclinical stroke with no residual deficits.  Has never been on hydrea. Has history of HTN. Has 2 children.  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     Has only required rare transfusion over the course of her life.    Since our last appt, notes increase VOC  Pain crises frequency. Required admission.   Still has not established care with a PMD or gynecologist despite encouragement.       PAST MEDICAL HISTORY:   Past Medical History:   Diagnosis Date    Asthma     Hypertension     Sickle cell-beta thalassemia disease with pain     Trigeminal neuralgia        PAST SURGICAL HISTORY:   Past Surgical History:   Procedure Laterality Date     SECTION      TONSILLECTOMY      TUBAL LIGATION         PAST SOCIAL HISTORY:   reports that she has never smoked. She has never used smokeless tobacco. She reports that she does not drink alcohol or use drugs.    FAMILY HISTORY:  No family history on file.    CURRENT MEDICATIONS:   Current Outpatient Prescriptions   Medication Sig    albuterol (ACCUNEB) 0.63 mg/3 mL Nebu Take 0.63 mg by nebulization every 6 (six) hours as needed (for wheezing/shortness of breath). Rescue     albuterol (VENTOLIN HFA) 90 mcg/actuation inhaler Inhale 2 puffs into the lungs every 6 (six) hours as needed  for Wheezing. Rescue    amlodipine (NORVASC) 10 MG tablet Take 1 tablet (10 mg total) by mouth once daily.    baclofen (LIORESAL) 10 MG tablet Take 1 tablet (10 mg total) by mouth 2 (two) times daily.    budesonide-formoterol 160-4.5 mcg (SYMBICORT) 160-4.5 mcg/actuation HFAA Inhale 2 puffs into the lungs every 12 (twelve) hours. Controller    carbamazepine (TEGRETOL) 200 mg tablet Take 2 tablets (400 mg total) by mouth 2 (two) times daily.    ergocalciferol (VITAMIN D2) 50,000 unit Cap Take 1 capsule (50,000 Units total) by mouth every 7 days. (Patient taking differently: Take 50,000 Units by mouth every Monday. )    fluoxetine (PROZAC) 40 MG capsule Take 40 mg by mouth once daily.    gabapentin (NEURONTIN) 300 MG capsule Take 1 capsule (300 mg total) by mouth 2 (two) times daily.    hydroxyurea (HYDREA) 500 mg Cap Take 1 capsule (500 mg total) by mouth once daily.    ondansetron (ZOFRAN-ODT) 8 MG TbDL Take 1 tablet (8 mg total) by mouth every 6 (six) hours as needed.    oxycodone-acetaminophen (PERCOCET)  mg per tablet Take 1 tablet by mouth every 4 (four) hours as needed for Pain.    senna-docusate 8.6-50 mg (PERICOLACE) 8.6-50 mg per tablet Take 1 tablet by mouth 2 (two) times daily.    cyclobenzaprine (FLEXERIL) 10 MG tablet Take 1 tablet (10 mg total) by mouth 3 (three) times daily as needed for Muscle spasms.    hydrochlorothiazide (MICROZIDE) 12.5 mg capsule Take 1 capsule (12.5 mg total) by mouth once daily.     No current facility-administered medications for this visit.      ALLERGIES:   Review of patient's allergies indicates:  No Known Allergies          REVIEW OF SYSTEMS:   General ROS: negative  Psychological ROS: negative  Ophthalmic ROS: negative  ENT ROS: negative  Allergy and Immunology ROS: negative  Hematological and Lymphatic ROS: negative  Endocrine ROS: negative  Respiratory ROS: negative  Cardiovascular ROS: negative  Gastrointestinal ROS: negative  Genito-Urinary ROS:  negative  Musculoskeletal ROS: see HPI  Neurological ROS: negative  Dermatological ROS: negative    PHYSICAL EXAM:   Vitals:    10/20/17 1316   BP: (!) 178/98   Pulse: 98   Temp: 99.9 °F (37.7 °C)       General - well developed, well nourished, no apparent distress  Head & Face - no sinus tenderness  Eyes - normal conjunctivae and lids   ENT - normal external auditory canals and tympanic membranes bilaterally oropharynx clear,  Normal dentition and gums  Neck - normal thyroid  Chest and Lung - normal respiratory effort, clear to auscultation bilaterally   Cardiovascular - RRR with no MGR, normal S1 and S2; no pedal edema  Abdomen -  soft, nontender, no palpable hepatomegaly or splenomegaly  Lymph - no palpable lymphadenopathy  Extremities - unremarkable nails and digits  Heme - no bruising, petechiae, pallor  Skin - no rashes or lesions  Psych - appropriate mood and affect      ECOG Performance Status: (foot note - ECOG PS provided by Eastern Cooperative Oncology Group) 1 - Symptomatic but completely ambulatory    Karnofsky Performance Score:  90%- Able to Carry on Normal Activity: Minor Symptoms of Disease  DATA:   Lab Results   Component Value Date    WBC 4.77 10/20/2017    HGB 12.2 10/20/2017    HCT 35.8 (L) 10/20/2017    MCV 72 (L) 10/20/2017     10/20/2017     Gran #   Date Value Ref Range Status   10/20/2017 1.9 1.8 - 7.7 K/uL Final     Gran%   Date Value Ref Range Status   10/20/2017 39.0 38.0 - 73.0 % Final     Lymph #   Date Value Ref Range Status   10/20/2017 2.3 1.0 - 4.8 K/uL Final     Lymph%   Date Value Ref Range Status   10/20/2017 49.1 (H) 18.0 - 48.0 % Final     CMP  Sodium   Date Value Ref Range Status   10/20/2017 141 136 - 145 mmol/L Final     Potassium   Date Value Ref Range Status   10/20/2017 3.3 (L) 3.5 - 5.1 mmol/L Final     Chloride   Date Value Ref Range Status   10/20/2017 108 95 - 110 mmol/L Final     CO2   Date Value Ref Range Status   10/20/2017 23 23 - 29 mmol/L Final      Glucose   Date Value Ref Range Status   10/20/2017 99 70 - 110 mg/dL Final     BUN, Bld   Date Value Ref Range Status   10/20/2017 6 6 - 20 mg/dL Final     Creatinine   Date Value Ref Range Status   10/20/2017 0.8 0.5 - 1.4 mg/dL Final     Calcium   Date Value Ref Range Status   10/20/2017 9.1 8.7 - 10.5 mg/dL Final     Total Protein   Date Value Ref Range Status   10/20/2017 7.6 6.0 - 8.4 g/dL Final     Albumin   Date Value Ref Range Status   10/20/2017 3.4 (L) 3.5 - 5.2 g/dL Final     Total Bilirubin   Date Value Ref Range Status   10/20/2017 0.7 0.1 - 1.0 mg/dL Final     Comment:     For infants and newborns, interpretation of results should be based  on gestational age, weight and in agreement with clinical  observations.  Premature Infant recommended reference ranges:  Up to 24 hours.............<8.0 mg/dL  Up to 48 hours............<12.0 mg/dL  3-5 days..................<15.0 mg/dL  6-29 days.................<15.0 mg/dL       Alkaline Phosphatase   Date Value Ref Range Status   10/20/2017 100 55 - 135 U/L Final     AST   Date Value Ref Range Status   10/20/2017 11 10 - 40 U/L Final     ALT   Date Value Ref Range Status   10/20/2017 10 10 - 44 U/L Final     Anion Gap   Date Value Ref Range Status   10/20/2017 10 8 - 16 mmol/L Final     eGFR if    Date Value Ref Range Status   10/20/2017 >60.0 >60 mL/min/1.73 m^2 Final     eGFR if non    Date Value Ref Range Status   10/20/2017 >60.0 >60 mL/min/1.73 m^2 Final     Comment:     Calculation used to obtain the estimated glomerular filtration  rate (eGFR) is the CKD-EPI equation. Since race is unknown   in our information system, the eGFR values for   -American and Non--American patients are given   for each creatinine result.       Quant 2.2 - 3.2 % 5.9    Hemoglobin Bands   Hb A , Hb S , Hb F , Hb A2   Hemoglobin Electrophoresis Interp   See comment   Comments: There are prominent bands in the A and S positions,    measuring approximately 20 % and 66 % respectively with a hemoglobin   F band of approximately 8% and an elevated hemoglobin A2.     This pattern suggests sickle cell/beta + thalassemia, provided that   there is no history of recent RBC transfusion.  Await acid   electrophoresis   for confirmation of hemoglobin S.   Interpreted by Violette Sutherland M.D.          ASSESSMENT AND PLAN:   Encounter Diagnoses   Name Primary?    Hypertension, unspecified type Yes    Sickle cell beta thalassemia      -patient has sickle beta thallassemia   Sickle Cell Disease Monitoring   1)Hydrea  -previously 1-3 crises a year; per patient with increasing severity and frequency over last 2-3 years   -initiate hydrea 500mg BID (5/22/17) but she quickly self discontinued due to mild rash; she would like to restart due to pain crises which I encouraged (restart date 10/20)      2)Iron Overload  -she is actually iron deficient, likely contributing to fatigue/anemia  -received  IV feraheme x 2 march 2017 with return of her hgb back to baseline and normalized ferritin; hgb normal today    -referred to gyne but unclear why appt not made ; will rereffer     3)AVN  -has chronic bilateral hip pain   -will discuss obtaining MR if pain returns  4)LE Ulcerations   NA  5)HTN  -continue norvasc 10; BP elevated  Today   -start hctz 12.5 mg today (10/20) given elevated BP    6)Opthalmic  -needs opthalmology referral  -discuss referral at next appt     7)Pain  -continue home percocet 10 q 6 hrs   8)CardioPulmonary  -states  Had normal TTE by hematologist in dec 2016  -have requested report; next due dec 2018  -continue inhalers     9)Renal  -baseline UA and urine pr:Cr with mild proteinura feb 2017; discuss adding ace at next appt   10)Neuro/CVA  -gives anecdotal history of subclinical stroke with no deficits  -will monitor  11)Vaccines   -receiving HIB series, menactra, prevnar followed by pneumovax      12)Contraception  -she has had tubal  ligation     -I had long discussion with patient regarding compliance with consultant appts; also discussed that she needs to establish care with an internist to manage her other chronic medical conditions like HTN, asthma, health maintenance, cancer screening and that I would be managing her sickle cell disease   -start ca vit d, start hctz I prescribed enough to get her pmd medical appt   Follow Up:  -cbc, cmp, retic, ferritin, type and screen and MD appt in 3 months    Gregory Montgomery MD  Hematology/Oncology/Bone Marrow Transplant

## 2017-10-20 NOTE — PROGRESS NOTES
Start ca vit otc  Start hctz and norvasc   Restart hydrea for worsening crisis  Gyne IMreferrals again

## 2017-10-20 NOTE — Clinical Note
-had requested opthalmology, gynecology, and intermal medicine referrals at last appt; patient stil needs to be seen by them; please schedule again  -cbc, cmp, retic, ferritin, type and screen and MD appt in 3 months

## 2017-10-30 DIAGNOSIS — D57.40 SICKLE CELL BETA THALASSEMIA: ICD-10-CM

## 2017-10-30 NOTE — TELEPHONE ENCOUNTER
----- Message from Eugene Clark sent at 10/30/2017  1:46 PM CDT -----  Contact: Pt   Refill on medication oxycodone-acetaminophen (PERCOCET)  mg per tablet    Pharmacy contact:[phone]  Contact::[phone]

## 2017-10-31 ENCOUNTER — TELEPHONE (OUTPATIENT)
Dept: HEMATOLOGY/ONCOLOGY | Facility: CLINIC | Age: 37
End: 2017-10-31

## 2017-10-31 RX ORDER — OXYCODONE AND ACETAMINOPHEN 10; 325 MG/1; MG/1
1 TABLET ORAL EVERY 4 HOURS PRN
Qty: 120 TABLET | Refills: 0 | Status: SHIPPED | OUTPATIENT
Start: 2017-10-31 | End: 2017-12-01 | Stop reason: SDUPTHER

## 2017-10-31 NOTE — TELEPHONE ENCOUNTER
----- Message from Eugene Clark sent at 10/31/2017  8:40 AM CDT -----  Contact: Pt   Pt states Rx for oxycodone-acetaminophen (PERCOCET)  mg per tablet was not sent to pharmacy     Contact::[phone]  Pharmacy contact::[phone]

## 2017-10-31 NOTE — TELEPHONE ENCOUNTER
----- Message from Rachel Maravilla sent at 10/31/2017  2:46 PM CDT -----  Contact: Pt  Pt calling requesting a refill on Percocet. She states she has called numerous times    Pt call back number [phone]  Spoke with pt at 305pm on 10/31/17 and explained that her refill request has been approved by Dr. Montgomery and sent to her pharmacy, pt verbalized understanding.  Dayna

## 2017-11-17 ENCOUNTER — OFFICE VISIT (OUTPATIENT)
Dept: OBSTETRICS AND GYNECOLOGY | Facility: CLINIC | Age: 37
End: 2017-11-17
Payer: MEDICAID

## 2017-11-17 VITALS
WEIGHT: 293 LBS | HEIGHT: 64 IN | SYSTOLIC BLOOD PRESSURE: 126 MMHG | BODY MASS INDEX: 50.02 KG/M2 | DIASTOLIC BLOOD PRESSURE: 74 MMHG

## 2017-11-17 DIAGNOSIS — D50.9 IRON DEFICIENCY ANEMIA, UNSPECIFIED IRON DEFICIENCY ANEMIA TYPE: ICD-10-CM

## 2017-11-17 DIAGNOSIS — N94.6 DYSMENORRHEA: ICD-10-CM

## 2017-11-17 DIAGNOSIS — N93.9 ABNORMAL UTERINE BLEEDING: ICD-10-CM

## 2017-11-17 DIAGNOSIS — Z01.419 WELL WOMAN EXAM WITH ROUTINE GYNECOLOGICAL EXAM: Primary | ICD-10-CM

## 2017-11-17 DIAGNOSIS — Z87.42 HISTORY OF ABNORMAL CERVICAL PAP SMEAR: ICD-10-CM

## 2017-11-17 PROCEDURE — 99213 OFFICE O/P EST LOW 20 MIN: CPT | Mod: PBBFAC | Performed by: NURSE PRACTITIONER

## 2017-11-17 PROCEDURE — 99999 PR PBB SHADOW E&M-EST. PATIENT-LVL III: CPT | Mod: PBBFAC,,, | Performed by: NURSE PRACTITIONER

## 2017-11-17 PROCEDURE — 88175 CYTOPATH C/V AUTO FLUID REDO: CPT

## 2017-11-17 PROCEDURE — 99385 PREV VISIT NEW AGE 18-39: CPT | Mod: S$PBB,,, | Performed by: NURSE PRACTITIONER

## 2017-11-17 NOTE — LETTER
November 17, 2017      Pee Montgomery MD  [address]  [facility] [address]           [facility] [address]  [address]  [address]  [facility] [address]  Phone: [phone]  Fax: [phone]          Patient: Nazanin Malone   MR Number: [id number]   YOB: 1980   Date of Visit: 11/17/2017       Dear Dr. Pee Montgomery:    Thank you for referring Nazanin Malone to me for evaluation. Attached you will find relevant portions of my assessment and plan of care.    If you have questions, please do not hesitate to call me. I look forward to following Nazanin Malone along with you.    Sincerely,    DOMINIC Cuello, NP    Enclosure  CC:  No Recipients    If you would like to receive this communication electronically, please contact [email or web address] or [phone] to request more information on [employer] access.    For providers and/or their staff who would like to refer a patient to [facility], please contact us through our one-stop-shop provider referral line, [facility], at [phone].    If you feel you have received this communication in error or would no longer like to receive these types of communications, please e-mail [email or web address]
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
## 2017-12-01 DIAGNOSIS — D57.40 SICKLE CELL BETA THALASSEMIA: ICD-10-CM

## 2017-12-01 RX ORDER — OXYCODONE AND ACETAMINOPHEN 10; 325 MG/1; MG/1
1 TABLET ORAL EVERY 4 HOURS PRN
Qty: 120 TABLET | Refills: 0 | Status: SHIPPED | OUTPATIENT
Start: 2017-12-01 | End: 2017-12-26 | Stop reason: SDUPTHER

## 2017-12-01 NOTE — TELEPHONE ENCOUNTER
----- Message from Eugene Clark sent at 12/1/2017 11:49 AM CST -----  Contact: Pt   Refill on oxyCODONE-acetaminophen (PERCOCET)  mg per tablet and her muscle relaxer     [facility] contact::[phone]

## 2017-12-11 ENCOUNTER — OFFICE VISIT (OUTPATIENT)
Dept: INTERNAL MEDICINE | Facility: CLINIC | Age: 37
End: 2017-12-11
Attending: FAMILY MEDICINE
Payer: MEDICAID

## 2017-12-11 VITALS
HEART RATE: 103 BPM | BODY MASS INDEX: 48.82 KG/M2 | DIASTOLIC BLOOD PRESSURE: 100 MMHG | OXYGEN SATURATION: 95 % | WEIGHT: 293 LBS | HEIGHT: 65 IN | SYSTOLIC BLOOD PRESSURE: 135 MMHG | TEMPERATURE: 99 F

## 2017-12-11 DIAGNOSIS — D57.00 SICKLE-CELL DISEASE WITH PAIN: ICD-10-CM

## 2017-12-11 DIAGNOSIS — R68.89 FLU-LIKE SYMPTOMS: ICD-10-CM

## 2017-12-11 DIAGNOSIS — I10 BENIGN ESSENTIAL HTN: ICD-10-CM

## 2017-12-11 DIAGNOSIS — E66.01 MORBID OBESITY DUE TO EXCESS CALORIES: ICD-10-CM

## 2017-12-11 DIAGNOSIS — D50.9 IRON DEFICIENCY ANEMIA, UNSPECIFIED IRON DEFICIENCY ANEMIA TYPE: ICD-10-CM

## 2017-12-11 DIAGNOSIS — J01.90 ACUTE RHINOSINUSITIS: Primary | ICD-10-CM

## 2017-12-11 LAB
FLUAV AG SPEC QL IA: NEGATIVE
FLUBV AG SPEC QL IA: NEGATIVE
SPECIMEN SOURCE: NORMAL

## 2017-12-11 PROCEDURE — 99204 OFFICE O/P NEW MOD 45 MIN: CPT | Mod: S$PBB,,, | Performed by: STUDENT IN AN ORGANIZED HEALTH CARE EDUCATION/TRAINING PROGRAM

## 2017-12-11 PROCEDURE — 99999 PR PBB SHADOW E&M-EST. PATIENT-LVL IV: CPT | Mod: PBBFAC,,, | Performed by: STUDENT IN AN ORGANIZED HEALTH CARE EDUCATION/TRAINING PROGRAM

## 2017-12-11 PROCEDURE — 99214 OFFICE O/P EST MOD 30 MIN: CPT | Mod: PBBFAC,PO | Performed by: STUDENT IN AN ORGANIZED HEALTH CARE EDUCATION/TRAINING PROGRAM

## 2017-12-11 PROCEDURE — 87400 INFLUENZA A/B EACH AG IA: CPT | Mod: 59

## 2017-12-11 RX ORDER — AMOXICILLIN AND CLAVULANATE POTASSIUM 500; 125 MG/1; MG/1
1 TABLET, FILM COATED ORAL 3 TIMES DAILY
Qty: 21 TABLET | Refills: 0 | Status: SHIPPED | OUTPATIENT
Start: 2017-12-11 | End: 2017-12-18

## 2017-12-11 NOTE — PROGRESS NOTES
Subjective:       Patient ID: Nazanin Malone is a 37 y.o. female.    Chief Complaint: Establish Care    Ms. Malone is a 37 year old female with a PMHx of sickle cell, beta thalassemia on Hydrea, asthma, HTN, iron deficiency, morbid obesity BMI 51.64, anxiety/depression and trigeminal neuralgia who presents today to establish care. She has been experiencing cold like symptoms over the last three days starting on 12/8 with chills, fever, body aches, clear rhinorrhea, congestion, nonproductive cough and post nasal drip which she has been treating with Tylenol and Mucinex for the last 48 hours. She endorses fever to 100.4 at home defervescing after tylenol. This cold has exacerbated her underlying asthma and she has been having worsening wheezing - she has been using her nebulizer every 4-6 hours - she is using her symbicort but only daily. She denies receiving any influenza vaccination and has exposure to ill individuals at her new job at the [employer] ([employer]). She is not happy with her job and is not planning on staying there long considering the position. She did receive her vaccinations of Prevnar, Menactra and HiB in June but has only received one round of HiB vaccination in June 2017. She is planning on seeing gyn for follow up US of her endometrium to determine the next step for AUB. She does endorse high stress levels due to behavioral issues with 14 year old daughter but does find help with Prozac and weekly counseling which she attends with her younger 11 year old daughter.       Review of Systems   Constitutional: Positive for chills, fatigue and fever.   HENT: Positive for congestion, postnasal drip, rhinorrhea, sinus pain and sneezing. Negative for nosebleeds.    Eyes: Negative for discharge and itching.   Respiratory: Positive for cough, chest tightness, shortness of breath and wheezing.    Cardiovascular: Negative for chest pain, palpitations and leg swelling.   Gastrointestinal: Negative for abdominal  pain, constipation, diarrhea, nausea and vomiting.   Genitourinary: Negative for difficulty urinating, dysuria and hematuria.   Musculoskeletal: Negative for arthralgias and back pain.   Skin: Negative for color change, pallor, rash and wound.   Neurological: Negative for light-headedness and headaches.   Hematological: Negative for adenopathy. Does not bruise/bleed easily.       Objective:      Physical Exam   Constitutional: She is oriented to person, place, and time. She appears well-developed and well-nourished. She is cooperative.  Non-toxic appearance. She does not have a sickly appearance. She does not appear ill. No distress.   HENT:   Head: Normocephalic and atraumatic.   Eyes: Conjunctivae and EOM are normal. Pupils are equal, round, and reactive to light.   Neck: Normal range of motion. No JVD present.   Cardiovascular: Regular rhythm, S1 normal, S2 normal, normal heart sounds and intact distal pulses.  Tachycardia present.    No murmur heard.  Pulses:       Carotid pulses are 2+ on the right side, and 2+ on the left side.       Radial pulses are 2+ on the right side, and 2+ on the left side.   Pulmonary/Chest: Effort normal. No accessory muscle usage. No respiratory distress. She has wheezes in the right upper field and the right middle field.   Patient able to speak in full sentences and ambulate to examination table without difficulty    Abdominal: Soft. Bowel sounds are normal. She exhibits no distension and no mass. There is no tenderness. There is no rebound and no guarding. No hernia.   Musculoskeletal: Normal range of motion. She exhibits no edema or tenderness.   Neurological: She is alert and oriented to person, place, and time. No cranial nerve deficit or sensory deficit. GCS eye subscore is 4. GCS verbal subscore is 5. GCS motor subscore is 6.   Skin: Skin is warm and dry.   Psychiatric: She has a normal mood and affect. Her speech is normal.   Nursing note and vitals reviewed.       Assessment:       1. Acute rhinosinusitis    2. Benign essential HTN    3. Sickle-cell disease with pain    4. Iron deficiency anemia, unspecified iron deficiency anemia type    5. Morbid obesity due to excess calories    6. Flu-like symptoms        Plan:         Acute rhinosinusitis  -     amoxicillin-clavulanate 500-125mg (AUGMENTIN) 500-125 mg Tab; Take 1 tablet (500 mg total) by mouth 3 (three) times daily.  Dispense: 21 tablet; Refill: 0    Benign essential HTN   - on HCTZ and Norvasc, patient takes BP meds at 3PM daily    Sickle-cell disease with pain  -     amoxicillin-clavulanate 500-125mg (AUGMENTIN) 500-125 mg Tab; Take 1 tablet (500 mg total) by mouth 3 (three) times daily.  Dispense: 21 tablet; Refill: 0    Iron deficiency anemia, unspecified iron deficiency anemia type    Morbid obesity due to excess calories    Flu-like symptoms  -     Influenza antigen Nasopharyngeal Swab; Standing  -     if feeling better will receive flu vaccine at next visit, additionally will need to complete HiB series (give 2nd of 3 at next visit received 1st of series in June 2017)     Patient seen and case reviewed with Dr. Mcfarlane. RTC in 1 week or PRN if symptoms worsen.     Balta Forebs M.D. PGY-1  [facility]  3:33 PM  12/11/2017

## 2017-12-12 ENCOUNTER — TELEPHONE (OUTPATIENT)
Dept: INTERNAL MEDICINE | Facility: CLINIC | Age: 37
End: 2017-12-12

## 2017-12-12 NOTE — TELEPHONE ENCOUNTER
Left VM for Ms. Malone regarding her flu swab results coming back negative. Also, wanted to touch base just to see how she is doing with regard to her rhinosinusitis. Advised to return call if symptomatically worse.

## 2017-12-13 ENCOUNTER — HOSPITAL ENCOUNTER (OUTPATIENT)
Dept: RADIOLOGY | Facility: OTHER | Age: 37
Discharge: HOME | End: 2017-12-13
Attending: NURSE PRACTITIONER
Payer: MEDICAID

## 2017-12-13 ENCOUNTER — OFFICE VISIT (OUTPATIENT)
Dept: OBSTETRICS AND GYNECOLOGY | Facility: CLINIC | Age: 37
End: 2017-12-13
Payer: MEDICAID

## 2017-12-13 VITALS — HEIGHT: 64 IN | BODY MASS INDEX: 50.02 KG/M2 | WEIGHT: 293 LBS

## 2017-12-13 DIAGNOSIS — N92.0 MENORRHAGIA WITH REGULAR CYCLE: Primary | ICD-10-CM

## 2017-12-13 DIAGNOSIS — N93.9 ABNORMAL UTERINE BLEEDING: ICD-10-CM

## 2017-12-13 PROCEDURE — 99999 PR PBB SHADOW E&M-EST. PATIENT-LVL II: CPT | Mod: PBBFAC,,, | Performed by: OBSTETRICS & GYNECOLOGY

## 2017-12-13 PROCEDURE — 76856 US EXAM PELVIC COMPLETE: CPT | Mod: 26,,, | Performed by: INTERNAL MEDICINE

## 2017-12-13 PROCEDURE — 76830 TRANSVAGINAL US NON-OB: CPT | Mod: 26,,, | Performed by: INTERNAL MEDICINE

## 2017-12-13 PROCEDURE — 99212 OFFICE O/P EST SF 10 MIN: CPT | Mod: PBBFAC,25 | Performed by: OBSTETRICS & GYNECOLOGY

## 2017-12-13 PROCEDURE — 76856 US EXAM PELVIC COMPLETE: CPT | Mod: TC

## 2017-12-13 PROCEDURE — 99214 OFFICE O/P EST MOD 30 MIN: CPT | Mod: S$PBB,,, | Performed by: OBSTETRICS & GYNECOLOGY
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
## 2017-12-14 ENCOUNTER — PATIENT MESSAGE (OUTPATIENT)
Dept: OBSTETRICS AND GYNECOLOGY | Facility: CLINIC | Age: 37
End: 2017-12-14

## 2017-12-14 ENCOUNTER — TELEPHONE (OUTPATIENT)
Dept: OBSTETRICS AND GYNECOLOGY | Facility: CLINIC | Age: 37
End: 2017-12-14

## 2017-12-14 RX ORDER — TRANEXAMIC ACID 650 MG/1
1300 TABLET ORAL 3 TIMES DAILY
Qty: 60 TABLET | Refills: 6 | Status: SHIPPED | OUTPATIENT
Start: 2017-12-14 | End: 2017-12-19

## 2017-12-14 NOTE — TELEPHONE ENCOUNTER
----- Message from Pee Montgomery MD sent at 12/13/2017  3:49 PM CST -----  just did quick lit search.  Cant find anything about TA increasing risk in SCD so go ahead.   Gregory   ----- Message -----  From: Kierra Santos MD  Sent: 12/13/2017  11:06 AM  To: Pee Montgomery MD    Trying to keep from op for menorrhagia - would you support Lysteda for her or do you think it would likely precipitate crises?

## 2017-12-14 NOTE — TELEPHONE ENCOUNTER
Message to patient:    Dr Montgomery doesn't see any troubles with Lysteda, given your hemoglobin S-C condition, so I'll send a prescription to your pharmacy.  Let me know if your cycles continue to be heavy, or if you'd like to schedule a Mirena insertion.  Please also call if you have questions/ worries.

## 2017-12-26 DIAGNOSIS — D57.40 SICKLE CELL BETA THALASSEMIA: ICD-10-CM

## 2017-12-26 RX ORDER — CYCLOBENZAPRINE HCL 10 MG
10 TABLET ORAL 3 TIMES DAILY PRN
Qty: 30 TABLET | Refills: 0 | Status: SHIPPED | OUTPATIENT
Start: 2017-12-26 | End: 2018-01-05

## 2017-12-26 RX ORDER — OXYCODONE AND ACETAMINOPHEN 10; 325 MG/1; MG/1
1 TABLET ORAL EVERY 4 HOURS PRN
Qty: 120 TABLET | Refills: 0 | Status: SHIPPED | OUTPATIENT
Start: 2017-12-26 | End: 2018-01-24 | Stop reason: SDUPTHER

## 2017-12-26 NOTE — TELEPHONE ENCOUNTER
----- Message from Phyllis Neri sent at 12/26/2017  1:02 PM CST -----  Contact: patient  Patient needs a refill on Oxycodone 10/325 mgs & Felxeril 10 mgs, [facility]  Patient's # is [phone]  Patient is in a lot of pain & doesn't want it to get worse so if the nurse can order them today she would appreciate it.

## 2018-01-16 NOTE — PROGRESS NOTES
I have reviewed and concur with the resident's history, physical, assessment, and plan.  I have personally interviewed and examined the patient  Nazanin Malone  at bedside.  See below addendum for my evaluation and additional findings.  In addition keep f/u for next week and recheck pressure at that time.

## 2018-01-24 DIAGNOSIS — D57.40 SICKLE CELL BETA THALASSEMIA: ICD-10-CM

## 2018-01-24 RX ORDER — CYCLOBENZAPRINE HCL 10 MG
10 TABLET ORAL 3 TIMES DAILY PRN
Qty: 45 TABLET | Refills: 0 | Status: SHIPPED | OUTPATIENT
Start: 2018-01-24 | End: 2018-02-03

## 2018-01-24 RX ORDER — OXYCODONE AND ACETAMINOPHEN 10; 325 MG/1; MG/1
1 TABLET ORAL EVERY 4 HOURS PRN
Qty: 120 TABLET | Refills: 0 | Status: ON HOLD | OUTPATIENT
Start: 2018-01-24 | End: 2018-02-13

## 2018-01-24 NOTE — TELEPHONE ENCOUNTER
----- Message from Estrella Alvarado sent at 1/24/2018  3:13 PM CST -----  Contact: Self  Pt is calling to speak with Staff regarding a prescription refill of oxyCODONE-acetaminophen (PERCOCET)  mg per tablet and Flexeril, 10 mg per tablet.    She can be reached at [phone].    Thank you.

## 2018-02-12 ENCOUNTER — HOSPITAL ENCOUNTER (INPATIENT)
Facility: HOSPITAL | Age: 38
LOS: 2 days | Discharge: HOME | DRG: 812 | End: 2018-02-14
Attending: EMERGENCY MEDICINE | Admitting: HOSPITALIST
Payer: MEDICAID

## 2018-02-12 DIAGNOSIS — D57.40 SICKLE CELL BETA THALASSEMIA: ICD-10-CM

## 2018-02-12 DIAGNOSIS — M79.605 LEFT LEG PAIN: Primary | ICD-10-CM

## 2018-02-12 DIAGNOSIS — D57.00 SICKLE CELL ANEMIA WITH PAIN: ICD-10-CM

## 2018-02-12 LAB
ALBUMIN SERPL BCP-MCNC: 3.6 G/DL
ALP SERPL-CCNC: 108 U/L
ALT SERPL W/O P-5'-P-CCNC: 15 U/L
ANION GAP SERPL CALC-SCNC: 7 MMOL/L
AST SERPL-CCNC: 16 U/L
BASOPHILS # BLD AUTO: 0.05 K/UL
BASOPHILS NFR BLD: 0.6 %
BILIRUB SERPL-MCNC: 0.3 MG/DL
BILIRUB UR QL STRIP: NEGATIVE
BUN SERPL-MCNC: 13 MG/DL
CALCIUM SERPL-MCNC: 8.9 MG/DL
CHLORIDE SERPL-SCNC: 102 MMOL/L
CLARITY UR REFRACT.AUTO: CLEAR
CO2 SERPL-SCNC: 25 MMOL/L
COLOR UR AUTO: NORMAL
CREAT SERPL-MCNC: 0.8 MG/DL
DIFFERENTIAL METHOD: ABNORMAL
EOSINOPHIL # BLD AUTO: 0.4 K/UL
EOSINOPHIL NFR BLD: 4.4 %
ERYTHROCYTE [DISTWIDTH] IN BLOOD BY AUTOMATED COUNT: 15.9 %
EST. GFR  (AFRICAN AMERICAN): >60 ML/MIN/1.73 M^2
EST. GFR  (NON AFRICAN AMERICAN): >60 ML/MIN/1.73 M^2
GLUCOSE SERPL-MCNC: 103 MG/DL
GLUCOSE UR QL STRIP: NEGATIVE
HCT VFR BLD AUTO: 34.7 %
HGB BLD-MCNC: 11.9 G/DL
HGB UR QL STRIP: NEGATIVE
IMM GRANULOCYTES # BLD AUTO: 0.02 K/UL
IMM GRANULOCYTES NFR BLD AUTO: 0.2 %
KETONES UR QL STRIP: NEGATIVE
LEUKOCYTE ESTERASE UR QL STRIP: NEGATIVE
LYMPHOCYTES # BLD AUTO: 2.6 K/UL
LYMPHOCYTES NFR BLD: 31.6 %
MCH RBC QN AUTO: 23.8 PG
MCHC RBC AUTO-ENTMCNC: 34.3 G/DL
MCV RBC AUTO: 70 FL
MONOCYTES # BLD AUTO: 0.6 K/UL
MONOCYTES NFR BLD: 7.9 %
NEUTROPHILS # BLD AUTO: 4.5 K/UL
NEUTROPHILS NFR BLD: 55.3 %
NITRITE UR QL STRIP: NEGATIVE
NRBC BLD-RTO: 0 /100 WBC
PH UR STRIP: 5 [PH] (ref 5–8)
PLATELET # BLD AUTO: 268 K/UL
PMV BLD AUTO: 10.5 FL
POTASSIUM SERPL-SCNC: 3.4 MMOL/L
PROT SERPL-MCNC: 7.7 G/DL
PROT UR QL STRIP: NEGATIVE
RBC # BLD AUTO: 4.99 M/UL
RETICS/RBC NFR AUTO: 2 %
SODIUM SERPL-SCNC: 134 MMOL/L
SP GR UR STRIP: 1.01 (ref 1–1.03)
URN SPEC COLLECT METH UR: NORMAL
UROBILINOGEN UR STRIP-ACNC: NEGATIVE EU/DL
WBC # BLD AUTO: 8.09 K/UL

## 2018-02-12 PROCEDURE — 25000003 PHARM REV CODE 250: Performed by: PHYSICIAN ASSISTANT

## 2018-02-12 PROCEDURE — 85045 AUTOMATED RETICULOCYTE COUNT: CPT

## 2018-02-12 PROCEDURE — 25000003 PHARM REV CODE 250: Performed by: STUDENT IN AN ORGANIZED HEALTH CARE EDUCATION/TRAINING PROGRAM

## 2018-02-12 PROCEDURE — 25000003 PHARM REV CODE 250: Performed by: EMERGENCY MEDICINE

## 2018-02-12 PROCEDURE — 99285 EMERGENCY DEPT VISIT HI MDM: CPT

## 2018-02-12 PROCEDURE — 96361 HYDRATE IV INFUSION ADD-ON: CPT

## 2018-02-12 PROCEDURE — 25000242 PHARM REV CODE 250 ALT 637 W/ HCPCS: Performed by: EMERGENCY MEDICINE

## 2018-02-12 PROCEDURE — 80053 COMPREHEN METABOLIC PANEL: CPT

## 2018-02-12 PROCEDURE — 25000242 PHARM REV CODE 250 ALT 637 W/ HCPCS: Performed by: PHYSICIAN ASSISTANT

## 2018-02-12 PROCEDURE — 99284 EMERGENCY DEPT VISIT MOD MDM: CPT | Mod: ,,, | Performed by: EMERGENCY MEDICINE

## 2018-02-12 PROCEDURE — 63600175 PHARM REV CODE 636 W HCPCS: Performed by: STUDENT IN AN ORGANIZED HEALTH CARE EDUCATION/TRAINING PROGRAM

## 2018-02-12 PROCEDURE — 94640 AIRWAY INHALATION TREATMENT: CPT

## 2018-02-12 PROCEDURE — 81003 URINALYSIS AUTO W/O SCOPE: CPT

## 2018-02-12 PROCEDURE — 11000001 HC ACUTE MED/SURG PRIVATE ROOM

## 2018-02-12 PROCEDURE — 85025 COMPLETE CBC W/AUTO DIFF WBC: CPT

## 2018-02-12 PROCEDURE — 96360 HYDRATION IV INFUSION INIT: CPT

## 2018-02-12 PROCEDURE — 63600175 PHARM REV CODE 636 W HCPCS: Performed by: PHYSICIAN ASSISTANT

## 2018-02-12 PROCEDURE — 99223 1ST HOSP IP/OBS HIGH 75: CPT | Mod: ,,, | Performed by: PHYSICIAN ASSISTANT

## 2018-02-12 RX ORDER — ONDANSETRON 8 MG/1
8 TABLET, ORALLY DISINTEGRATING ORAL EVERY 6 HOURS PRN
Status: DISCONTINUED | OUTPATIENT
Start: 2018-02-12 | End: 2018-02-14 | Stop reason: HOSPADM

## 2018-02-12 RX ORDER — PREDNISONE 20 MG/1
40 TABLET ORAL
Status: COMPLETED | OUTPATIENT
Start: 2018-02-12 | End: 2018-02-12

## 2018-02-12 RX ORDER — HYDROMORPHONE HYDROCHLORIDE 2 MG/1
2 TABLET ORAL
Status: DISCONTINUED | OUTPATIENT
Start: 2018-02-12 | End: 2018-02-14 | Stop reason: HOSPADM

## 2018-02-12 RX ORDER — BACLOFEN 10 MG/1
10 TABLET ORAL 2 TIMES DAILY
Status: DISCONTINUED | OUTPATIENT
Start: 2018-02-12 | End: 2018-02-12

## 2018-02-12 RX ORDER — AMLODIPINE BESYLATE 10 MG/1
10 TABLET ORAL DAILY
Status: DISCONTINUED | OUTPATIENT
Start: 2018-02-12 | End: 2018-02-14 | Stop reason: HOSPADM

## 2018-02-12 RX ORDER — SODIUM CHLORIDE 0.9 % (FLUSH) 0.9 %
3 SYRINGE (ML) INJECTION
Status: DISCONTINUED | OUTPATIENT
Start: 2018-02-12 | End: 2018-02-14 | Stop reason: HOSPADM

## 2018-02-12 RX ORDER — HYDROXYZINE HYDROCHLORIDE 25 MG/1
25 TABLET, FILM COATED ORAL 3 TIMES DAILY PRN
Status: DISCONTINUED | OUTPATIENT
Start: 2018-02-12 | End: 2018-02-12

## 2018-02-12 RX ORDER — HYDROMORPHONE HYDROCHLORIDE 2 MG/1
4 TABLET ORAL EVERY 4 HOURS
Status: DISCONTINUED | OUTPATIENT
Start: 2018-02-12 | End: 2018-02-13

## 2018-02-12 RX ORDER — CARBAMAZEPINE 200 MG/1
400 TABLET ORAL 2 TIMES DAILY
Status: DISCONTINUED | OUTPATIENT
Start: 2018-02-12 | End: 2018-02-12

## 2018-02-12 RX ORDER — AMOXICILLIN 250 MG
1 CAPSULE ORAL 2 TIMES DAILY
Status: DISCONTINUED | OUTPATIENT
Start: 2018-02-12 | End: 2018-02-14 | Stop reason: HOSPADM

## 2018-02-12 RX ORDER — AMLODIPINE BESYLATE 10 MG/1
10 TABLET ORAL DAILY
Status: DISCONTINUED | OUTPATIENT
Start: 2018-02-12 | End: 2018-02-12

## 2018-02-12 RX ORDER — GABAPENTIN 300 MG/1
300 CAPSULE ORAL 2 TIMES DAILY
Status: DISCONTINUED | OUTPATIENT
Start: 2018-02-12 | End: 2018-02-14 | Stop reason: HOSPADM

## 2018-02-12 RX ORDER — IPRATROPIUM BROMIDE AND ALBUTEROL SULFATE 2.5; .5 MG/3ML; MG/3ML
3 SOLUTION RESPIRATORY (INHALATION)
Status: COMPLETED | OUTPATIENT
Start: 2018-02-12 | End: 2018-02-12

## 2018-02-12 RX ORDER — ONDANSETRON 2 MG/ML
4 INJECTION INTRAMUSCULAR; INTRAVENOUS EVERY 6 HOURS PRN
Status: DISCONTINUED | OUTPATIENT
Start: 2018-02-12 | End: 2018-02-14 | Stop reason: HOSPADM

## 2018-02-12 RX ORDER — POTASSIUM CHLORIDE 20 MEQ/1
40 TABLET, EXTENDED RELEASE ORAL ONCE
Status: COMPLETED | OUTPATIENT
Start: 2018-02-12 | End: 2018-02-12

## 2018-02-12 RX ORDER — OXYCODONE HYDROCHLORIDE 5 MG/1
20 TABLET ORAL EVERY 4 HOURS PRN
Status: DISCONTINUED | OUTPATIENT
Start: 2018-02-12 | End: 2018-02-12

## 2018-02-12 RX ORDER — HYDROCHLOROTHIAZIDE 12.5 MG/1
12.5 TABLET ORAL DAILY
Status: DISCONTINUED | OUTPATIENT
Start: 2018-02-12 | End: 2018-02-12

## 2018-02-12 RX ORDER — ALBUTEROL SULFATE 90 UG/1
2 AEROSOL, METERED RESPIRATORY (INHALATION) EVERY 6 HOURS PRN
Status: DISCONTINUED | OUTPATIENT
Start: 2018-02-12 | End: 2018-02-14 | Stop reason: HOSPADM

## 2018-02-12 RX ORDER — FLUTICASONE FUROATE AND VILANTEROL 100; 25 UG/1; UG/1
1 POWDER RESPIRATORY (INHALATION) DAILY
Status: DISCONTINUED | OUTPATIENT
Start: 2018-02-12 | End: 2018-02-14 | Stop reason: HOSPADM

## 2018-02-12 RX ORDER — OXYCODONE HYDROCHLORIDE 5 MG/1
10 TABLET ORAL
Status: COMPLETED | OUTPATIENT
Start: 2018-02-12 | End: 2018-02-12

## 2018-02-12 RX ORDER — DIPHENHYDRAMINE HYDROCHLORIDE 50 MG/ML
25 INJECTION INTRAMUSCULAR; INTRAVENOUS ONCE
Status: COMPLETED | OUTPATIENT
Start: 2018-02-12 | End: 2018-02-12

## 2018-02-12 RX ORDER — CARBAMAZEPINE 200 MG/1
400 TABLET ORAL 2 TIMES DAILY
Status: DISCONTINUED | OUTPATIENT
Start: 2018-02-12 | End: 2018-02-14 | Stop reason: HOSPADM

## 2018-02-12 RX ORDER — DIPHENHYDRAMINE HYDROCHLORIDE 50 MG/ML
25 INJECTION INTRAMUSCULAR; INTRAVENOUS EVERY 6 HOURS PRN
Status: DISCONTINUED | OUTPATIENT
Start: 2018-02-12 | End: 2018-02-13

## 2018-02-12 RX ORDER — HYDROMORPHONE HYDROCHLORIDE 2 MG/1
4 TABLET ORAL EVERY 4 HOURS PRN
Status: DISCONTINUED | OUTPATIENT
Start: 2018-02-12 | End: 2018-02-12

## 2018-02-12 RX ORDER — NALOXONE HCL 0.4 MG/ML
0.4 VIAL (ML) INJECTION
Status: DISCONTINUED | OUTPATIENT
Start: 2018-02-12 | End: 2018-02-14 | Stop reason: HOSPADM

## 2018-02-12 RX ORDER — OXYCODONE HYDROCHLORIDE 5 MG/1
20 TABLET ORAL
Status: COMPLETED | OUTPATIENT
Start: 2018-02-12 | End: 2018-02-12

## 2018-02-12 RX ORDER — BACLOFEN 10 MG/1
10 TABLET ORAL 2 TIMES DAILY
Status: DISCONTINUED | OUTPATIENT
Start: 2018-02-12 | End: 2018-02-14 | Stop reason: HOSPADM

## 2018-02-12 RX ORDER — GABAPENTIN 300 MG/1
300 CAPSULE ORAL 2 TIMES DAILY
Status: DISCONTINUED | OUTPATIENT
Start: 2018-02-12 | End: 2018-02-12

## 2018-02-12 RX ORDER — HYDRALAZINE HYDROCHLORIDE 25 MG/1
25 TABLET, FILM COATED ORAL EVERY 8 HOURS PRN
Status: DISCONTINUED | OUTPATIENT
Start: 2018-02-12 | End: 2018-02-14 | Stop reason: HOSPADM

## 2018-02-12 RX ADMIN — HYDROMORPHONE HYDROCHLORIDE 2 MG: 2 TABLET ORAL at 11:02

## 2018-02-12 RX ADMIN — GABAPENTIN 300 MG: 300 CAPSULE ORAL at 12:02

## 2018-02-12 RX ADMIN — HYDROMORPHONE HYDROCHLORIDE 4 MG: 2 TABLET ORAL at 09:02

## 2018-02-12 RX ADMIN — SODIUM CHLORIDE 1000 ML: 0.9 INJECTION, SOLUTION INTRAVENOUS at 07:02

## 2018-02-12 RX ADMIN — BACLOFEN 10 MG: 10 TABLET ORAL at 12:02

## 2018-02-12 RX ADMIN — OXYCODONE HYDROCHLORIDE 30 MG: 20 TABLET, FILM COATED, EXTENDED RELEASE ORAL at 07:02

## 2018-02-12 RX ADMIN — BACLOFEN 10 MG: 10 TABLET ORAL at 09:02

## 2018-02-12 RX ADMIN — HYDROMORPHONE HYDROCHLORIDE 4 MG: 2 TABLET ORAL at 05:02

## 2018-02-12 RX ADMIN — DIPHENHYDRAMINE HYDROCHLORIDE 25 MG: 50 INJECTION, SOLUTION INTRAMUSCULAR; INTRAVENOUS at 09:02

## 2018-02-12 RX ADMIN — HYDROCHLOROTHIAZIDE 12.5 MG: 12.5 TABLET ORAL at 12:02

## 2018-02-12 RX ADMIN — HYDRALAZINE HYDROCHLORIDE 25 MG: 25 TABLET, FILM COATED ORAL at 01:02

## 2018-02-12 RX ADMIN — HYDROMORPHONE HYDROCHLORIDE 2 MG: 2 TABLET ORAL at 07:02

## 2018-02-12 RX ADMIN — SODIUM CHLORIDE, SODIUM LACTATE, POTASSIUM CHLORIDE, AND CALCIUM CHLORIDE 1000 ML: .6; .31; .03; .02 INJECTION, SOLUTION INTRAVENOUS at 10:02

## 2018-02-12 RX ADMIN — CARBAMAZEPINE 400 MG: 200 TABLET ORAL at 09:02

## 2018-02-12 RX ADMIN — FLUTICASONE FUROATE AND VILANTEROL TRIFENATATE 1 PUFF: 100; 25 POWDER RESPIRATORY (INHALATION) at 05:02

## 2018-02-12 RX ADMIN — POTASSIUM CHLORIDE 40 MEQ: 1500 TABLET, EXTENDED RELEASE ORAL at 10:02

## 2018-02-12 RX ADMIN — IPRATROPIUM BROMIDE AND ALBUTEROL SULFATE 3 ML: .5; 3 SOLUTION RESPIRATORY (INHALATION) at 11:02

## 2018-02-12 RX ADMIN — GABAPENTIN 300 MG: 300 CAPSULE ORAL at 09:02

## 2018-02-12 RX ADMIN — PREDNISONE 40 MG: 20 TABLET ORAL at 01:02

## 2018-02-12 RX ADMIN — OXYCODONE HYDROCHLORIDE 20 MG: 5 TABLET ORAL at 12:02

## 2018-02-12 RX ADMIN — OXYCODONE HYDROCHLORIDE 10 MG: 5 TABLET ORAL at 10:02

## 2018-02-12 RX ADMIN — HYDROXYZINE HYDROCHLORIDE 25 MG: 25 TABLET, FILM COATED ORAL at 05:02

## 2018-02-12 RX ADMIN — CARBAMAZEPINE 400 MG: 200 TABLET ORAL at 12:02

## 2018-02-12 RX ADMIN — DIPHENHYDRAMINE HYDROCHLORIDE 25 MG: 50 INJECTION, SOLUTION INTRAMUSCULAR; INTRAVENOUS at 03:02

## 2018-02-12 RX ADMIN — AMLODIPINE BESYLATE 10 MG: 10 TABLET ORAL at 12:02

## 2018-02-12 RX ADMIN — SODIUM CHLORIDE 1000 ML: 0.9 INJECTION, SOLUTION INTRAVENOUS at 05:02

## 2018-02-12 RX ADMIN — OXYCODONE HYDROCHLORIDE 20 MG: 5 TABLET ORAL at 03:02

## 2018-02-12 RX ADMIN — STANDARDIZED SENNA CONCENTRATE AND DOCUSATE SODIUM 1 TABLET: 8.6; 5 TABLET, FILM COATED ORAL at 09:02

## 2018-02-12 NOTE — H&P
History and Physical  Hospital Medicine       [place] [facility] YUE [facility] JOSE Pleitez PA-C  Admit Date: 2/12/2018  ALYSE 2/14/18  Principal Problem:  Sickle cell anemia with pain   Patient information was obtained from patient, past medical records and ER records.   Primary care Physician: Primary Doctor Kezia    HPI: Patient is a 38yo AAF with a PMHx of HTN and sickle cell being admitted to medicine for evaluation of sickle cell pain crisis. Patient states that she woke up this morning at 3am in a pain crisis. Patient describes the pain as constant, sharp, and severe to her left lower extremity hip to toe. Her pain is worsened by touching and weight bearing. She took two 10mg percocets with no relief. Patient denies fevers, chills, chest pain, abdominal pain, shortness of breath, and nausea. Patient states this episode is like her typical pain crisis that occurs 1-3 times a year. Of note, patient follows with Heme/Onc for pain management with sickle cell disease.    In the ED, patient hypertensive likely due to pain. Intake labs remarkable for K 3.4, given electrolyte replacement. Pain control administered with 30mg Oxy ER and 30mg Oxy IR. 2 liters fluid given.     Review of Systems   Constitutional: Negative for chills, diaphoresis and fever.   HENT: Positive for congestion. Negative for sore throat.    Eyes: Negative for blurred vision, double vision and photophobia.   Respiratory: Positive for cough, sputum production and wheezing. Negative for shortness of breath.    Cardiovascular: Negative for chest pain, palpitations and leg swelling.   Gastrointestinal: Negative for abdominal pain, blood in stool, diarrhea, nausea and vomiting.   Genitourinary: Negative for dysuria, frequency, hematuria and urgency.   Musculoskeletal: Positive for back pain and joint pain.   Skin: Positive for itching. Negative for rash.   Neurological: Negative for dizziness, sensory change, focal weakness, weakness and headaches.    Psychiatric/Behavioral: Negative for depression and hallucinations. The patient is not nervous/anxious.        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    Past Surgical History: Patient has a past surgical history that includes  section; Tubal ligation; and Tonsillectomy.    Social History: Patient reports that she has never smoked. She has never used smokeless tobacco. She reports that she does not drink alcohol or use drugs.    Family History: family history is not on file.    Medications: reviewed     Allergies: Patient has No Known Allergies.    Physical Exam:     Vital Signs (Most Recent):  Temp: 98.4 °F (36.9 °C) (18 1355)  Pulse: 94 (18 1355)  Resp: 20 (18 1355)  BP: (!) 173/92 (18 1355)  SpO2: 98 % (18 1355) Vital Signs Range (Last 24H):  Temp:  [98.2 °F (36.8 °C)-99.3 °F (37.4 °C)]   Pulse:  []   Resp:  [18-20]   BP: (173-201)/()   SpO2:  [95 %-98 %]    Body mass index is 53.14 kg/m².     Physical Exam   Constitutional: She is oriented to person, place, and time and well-developed, well-nourished, and in no distress. No distress.   Resting comfortably   HENT:   Head: Normocephalic and atraumatic.   Mouth/Throat: No oropharyngeal exudate.   Eyes: EOM are normal. Pupils are equal, round, and reactive to light. No scleral icterus.   Neck: Normal range of motion. Neck supple.   Cardiovascular: Normal rate, regular rhythm, normal heart sounds and intact distal pulses.    No murmur heard.  Pulmonary/Chest: No respiratory distress. She has wheezes (mild expiratory).   Abdominal: Soft. Bowel  sounds are normal. She exhibits no distension. There is no tenderness.   Musculoskeletal: Normal range of motion. She exhibits tenderness (LLE, hip to foot). She exhibits no edema.   Lymphadenopathy:     She has no cervical adenopathy.   Neurological: She is alert and oriented to person, place, and time. No cranial nerve deficit.   Skin: Skin is warm and dry. No rash noted. She is not diaphoretic. No erythema.   Psychiatric: Memory, affect and judgment normal.         Recent Labs  Lab 02/12/18  0700   WBC 8.09   HGB 11.9*   HCT 34.7*            Recent Labs  Lab 02/12/18  0700   *   K 3.4*      CO2 25   BUN 13   CREATININE 0.8      CALCIUM 8.9       Recent Labs  Lab 02/12/18  0700   ALKPHOS 108   ALT 15   AST 16   ALBUMIN 3.6   PROT 7.7   BILITOT 0.3      No results for input(s): POCTGLUCOSE in the last 168 hours.    Other diagnostic tests None     Assessment and Plan     Active Hospital Problems    Diagnosis  POA    *Sickle cell anemia with pain [D57.00]  Yes     Priority: 1 - High    Benign essential HTN [I10]  Yes     Priority: 3     Opioid dependence [F11.20]  Yes      Resolved Hospital Problems    Diagnosis Date Resolved POA   No resolved problems to display.     Sickle cell anemia with pain  Opioid depedence  Patient with history of sickle cell pain crisis requiring hospital admission for pain control. Rarely requires transfusions. Patient follows with Heme/Onc, per , [facility]. Patient stable on room air.  - continue Oxy ER 30mg q12  - Oxy 20mg IR q4h PRN for breakthrough pain  - Gabapentin 300mg BID  - continue IVF    Benign essential hypertension  - Amlodipine 10mg daily  - Hydralazine PRN    Asthma  - Breo inhaler daily  - Albuterol inhaler for rescue  - Tesslon pearls PRN for cough     DVT prophylaxis: TEDs and SCDs     Discharge Plan: Pending improvement in pain, likely discharge in 1-2 days    HIGH RISK CONDITION(S):  None    Brenda GARCIA  GEOVANNY Pleitez

## 2018-02-12 NOTE — ED NOTES
Pt transported to room 929 B via wheel chair with YENY Baig Pt condition stable on transport, pt belongings are with pt and pt will notify family of room number on arrival to floor

## 2018-02-12 NOTE — ED PROVIDER NOTES
Encounter Date: 2018    SCRIBE #1 NOTE: I, Cheri Mcghee, am scribing for, and in the presence of,  Dr. Zapien. I have scribed the following portions of the note - the Resident attestation.       History     Chief Complaint   Patient presents with    Sickle Cell Pain Crisis     Pt reports sickle cell crisis for past 3 days progressively worsening, pain in bilateral lower extremities, rates 10/10.      38 y/o lady with HTN, asthma, and sickle cell disease presents today for sickle cell pain crisis. Patient states she woke up at 3AM this morning with constant, sharp, severe, 10/10 left leg pain typical of her pain crises. Patient normally takes percocet 10 mg at home, but states she ran out a few days ago and has been using tylenol without relief. She denies any recent illnesses or alcohol use and believes her pain crises was brought on by the change in weather. She denies having any fevers, chills, headaches, changes in vision, CP, SOB, hemoptysis, n/v/d.           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    Asthma     Depression     Hypertension     Morbid obesity     Opioid dependence 2017    Pneumonia due to Streptococcus pneumoniae 2017    Sepsis due to Streptococcus pneumoniae 2017    Sickle cell-beta thalassemia disease with pain     Trigeminal neuralgia      Past Surgical History:   Procedure Laterality Date     SECTION      TONSILLECTOMY      TUBAL LIGATION       Family History   Problem Relation Age of Onset    Breast cancer Neg Hx     Ovarian cancer Neg Hx     Colon cancer Neg Hx      Social History   Substance Use Topics    Smoking status: Never Smoker    Smokeless tobacco: Never Used    Alcohol use No     Review of Systems   Constitutional: Negative for chills, diaphoresis and fever.    HENT: Negative for congestion and sore throat.    Eyes: Negative for photophobia and visual disturbance.   Respiratory: Negative for cough, chest tightness and shortness of breath.    Cardiovascular: Negative for chest pain, palpitations and leg swelling.   Gastrointestinal: Negative for abdominal pain, blood in stool, constipation, diarrhea, nausea and vomiting.   Genitourinary: Negative for dysuria and hematuria.   Musculoskeletal: Negative for arthralgias and myalgias.   Skin: Negative for color change and rash.   Neurological: Negative for dizziness, weakness, light-headedness, numbness and headaches.       Physical Exam     Initial Vitals [02/12/18 0524]   BP Pulse Resp Temp SpO2   (!) 181/117 102 20 99.3 °F (37.4 °C) 97 %      MAP       138.33         Physical Exam    Constitutional: She appears well-developed and well-nourished. She is not diaphoretic. She appears distressed (mild distress 2/2 pain. ).   HENT:   Head: Normocephalic and atraumatic.   Mouth/Throat: Oropharynx is clear and moist.   Eyes: Conjunctivae and EOM are normal. Pupils are equal, round, and reactive to light.   Neck: Normal range of motion. Neck supple. No JVD present.   Cardiovascular: Regular rhythm, normal heart sounds and intact distal pulses.   No murmur heard.  tachycardic   Pulmonary/Chest: Breath sounds normal. No respiratory distress. She has no wheezes. She has no rales.   Abdominal: Soft. Bowel sounds are normal. She exhibits no distension. There is no tenderness.   Musculoskeletal: Normal range of motion. She exhibits no edema or tenderness.   Neurological: She is alert and oriented to person, place, and time.   Skin: Skin is warm and dry. Capillary refill takes less than 2 seconds. No rash noted. No erythema.         ED Course   Procedures  Labs Reviewed   CBC W/ AUTO DIFFERENTIAL - Abnormal; Notable for the following:        Result Value    Hemoglobin 11.9 (*)     Hematocrit 34.7 (*)     MCV 70 (*)     MCH 23.8 (*)      RDW 15.9 (*)     All other components within normal limits   COMPREHENSIVE METABOLIC PANEL - Abnormal; Notable for the following:     Sodium 134 (*)     Potassium 3.4 (*)     Anion Gap 7 (*)     All other components within normal limits   RETICULOCYTES   RETICULOCYTES    Narrative:     retic count  add on per Shiraz Zapien MD @ 08:20 on   02/12/2018; order# [id number]        Imaging Results          US Lower Extremity Veins Left (Preliminary result)  Result time 02/12/18 10:07:00    Preliminary result by Kaylee Green MD (02/12/18 10:07:00)                 Impression:        No evidence of deep venous thrombosis on the left side.      ______________________________________     Electronically signed by resident: KAYLEE GREEN  Date:     02/12/18  Time:    10:06            As the supervising and teaching physician, I personally reviewed the images and resident's interpretation and I agree with the findings.               Narrative:    Time of Procedure: 02/12/18 09:16:24  Accession # [id number]    Technique: Duplex and color flow Doppler evaluation of the left lower extremity.    Comparison: None.    Findings:    Right - There is no evidence of acute venous thrombosis in the common femoral or greater saphenous veins.  There is no reflux to suggest valvular incompetence.    Left - There is no evidence of acute venous thrombosis in the common femoral, superficial femoral, greater saphenous, popliteal, peroneal, anterior tibial and posterior tibial veins.  There is no reflux to suggest valvular incompetence.                                 Medical Decision Making:   History:   Old Medical Records: I decided to obtain old medical records.  Initial Assessment:   38 y/o F presents with sickle cell pain crisis.  Differential Diagnosis:   DDx includes sickle cell pain crisis, DVT, dehydration, lumbar radiculopathy, musculoskeletal leg pain.   Clinical Tests:   Lab Tests: Ordered and Reviewed  Radiological  Study: Ordered and Reviewed  ED Management:  38 y/o lady presents with sickle cell pain crisis in her LLE. Patient arrived to ED in mild distress 2/2 to pain, afebrile, hypertensive and mildly tachycardic. CBC, CMP and LLE u/s ordered. Patient given 1L NS bolus and oxycodone 30 mg PO for pain with good response. Shortly before given oxycodone 30 mg patient was noted to be sleeping comfortably in intake. CBC without leukocytosis, showed mild anemia of 11.9. Reticulocytes WNL at 2.0. CMP with mild hypokalemia 3.5, which was replaced with oral potassium.             Scribe Attestation:   Scribe #1: I performed the above scribed service and the documentation accurately describes the services I performed. I attest to the accuracy of the note.    Attending Attestation:   Physician Attestation Statement for Resident:  As the supervising MD   Physician Attestation Statement: I have personally seen and examined this patient.   I agree with the above history. -: 37 y.o. female with comorbidity of sickle cell disease and asthma presents for evaluation of left leg pain noted today.    As the supervising MD I agree with the above PE.    As the supervising MD I agree with the above treatment, course, plan, and disposition.  I have reviewed the following: old records at this facility.            Attending ED Notes:   Patient noted to exhibit mild expiratory wheezes upon awakening from sleep without associated significant tachypnea.  A Combivent nebulizer treatment has been administered with subsequent resolution of expiratory wheezing.  Ultrasound of lower extremities does not reveal any evidence of embolic disease.  Laboratory evaluation reveals minimal anemia without significant reticulocytosis.  Despite these encouraging findings, the patient continues to describe severe, unrelenting lower extremity pain despite approximately 60 mg of oxycodone.  In light of these findings, the patient will be placed in observation with  internal medicine in fair condition for further therapy and management.          ED Course      Clinical Impression:   The primary encounter diagnosis was Left leg pain. A diagnosis of Sickle cell anemia with pain was also pertinent to this visit.    Disposition:   Disposition: Placed in Observation  Condition: Fair                          Shiraz Zapien MD  02/12/18 1082

## 2018-02-12 NOTE — ED TRIAGE NOTES
Pt presents to ED from home with c/o sickle cell pain that awoke pt at 0300.  States that pain transitions from left hip to left foot.  Rates pain as 10/10.

## 2018-02-13 LAB
ANION GAP SERPL CALC-SCNC: 11 MMOL/L
BASOPHILS # BLD AUTO: 0.04 K/UL
BASOPHILS NFR BLD: 0.4 %
BUN SERPL-MCNC: 7 MG/DL
CALCIUM SERPL-MCNC: 8.4 MG/DL
CHLORIDE SERPL-SCNC: 103 MMOL/L
CO2 SERPL-SCNC: 23 MMOL/L
CREAT SERPL-MCNC: 0.7 MG/DL
DIFFERENTIAL METHOD: ABNORMAL
EOSINOPHIL # BLD AUTO: 0.5 K/UL
EOSINOPHIL NFR BLD: 5 %
ERYTHROCYTE [DISTWIDTH] IN BLOOD BY AUTOMATED COUNT: 15.6 %
EST. GFR  (AFRICAN AMERICAN): >60 ML/MIN/1.73 M^2
EST. GFR  (NON AFRICAN AMERICAN): >60 ML/MIN/1.73 M^2
GLUCOSE SERPL-MCNC: 102 MG/DL
HCT VFR BLD AUTO: 34.8 %
HGB BLD-MCNC: 11.8 G/DL
IMM GRANULOCYTES # BLD AUTO: 0.02 K/UL
IMM GRANULOCYTES NFR BLD AUTO: 0.2 %
LYMPHOCYTES # BLD AUTO: 2.4 K/UL
LYMPHOCYTES NFR BLD: 26.6 %
MAGNESIUM SERPL-MCNC: 2 MG/DL
MCH RBC QN AUTO: 23.6 PG
MCHC RBC AUTO-ENTMCNC: 33.9 G/DL
MCV RBC AUTO: 70 FL
MONOCYTES # BLD AUTO: 0.7 K/UL
MONOCYTES NFR BLD: 7.8 %
NEUTROPHILS # BLD AUTO: 5.3 K/UL
NEUTROPHILS NFR BLD: 60 %
NRBC BLD-RTO: 0 /100 WBC
PHOSPHATE SERPL-MCNC: 2.7 MG/DL
PLATELET # BLD AUTO: 268 K/UL
PMV BLD AUTO: 10.6 FL
POTASSIUM SERPL-SCNC: 3.9 MMOL/L
RBC # BLD AUTO: 4.99 M/UL
SODIUM SERPL-SCNC: 137 MMOL/L
WBC # BLD AUTO: 8.92 K/UL

## 2018-02-13 PROCEDURE — 25000003 PHARM REV CODE 250: Performed by: PHYSICIAN ASSISTANT

## 2018-02-13 PROCEDURE — 84100 ASSAY OF PHOSPHORUS: CPT

## 2018-02-13 PROCEDURE — 11000001 HC ACUTE MED/SURG PRIVATE ROOM

## 2018-02-13 PROCEDURE — 80048 BASIC METABOLIC PNL TOTAL CA: CPT

## 2018-02-13 PROCEDURE — 83735 ASSAY OF MAGNESIUM: CPT

## 2018-02-13 PROCEDURE — 36415 COLL VENOUS BLD VENIPUNCTURE: CPT

## 2018-02-13 PROCEDURE — 63600175 PHARM REV CODE 636 W HCPCS: Performed by: PHYSICIAN ASSISTANT

## 2018-02-13 PROCEDURE — 99232 SBSQ HOSP IP/OBS MODERATE 35: CPT | Mod: ,,, | Performed by: PHYSICIAN ASSISTANT

## 2018-02-13 PROCEDURE — 63600175 PHARM REV CODE 636 W HCPCS: Performed by: HOSPITALIST

## 2018-02-13 PROCEDURE — 25000242 PHARM REV CODE 250 ALT 637 W/ HCPCS: Performed by: PHYSICIAN ASSISTANT

## 2018-02-13 PROCEDURE — 85025 COMPLETE CBC W/AUTO DIFF WBC: CPT

## 2018-02-13 PROCEDURE — 25000003 PHARM REV CODE 250: Performed by: STUDENT IN AN ORGANIZED HEALTH CARE EDUCATION/TRAINING PROGRAM

## 2018-02-13 RX ORDER — HYDROMORPHONE HYDROCHLORIDE 2 MG/ML
1 INJECTION, SOLUTION INTRAMUSCULAR; INTRAVENOUS; SUBCUTANEOUS EVERY 6 HOURS PRN
Status: DISCONTINUED | OUTPATIENT
Start: 2018-02-13 | End: 2018-02-14 | Stop reason: HOSPADM

## 2018-02-13 RX ORDER — HYDROMORPHONE HYDROCHLORIDE 2 MG/ML
INJECTION, SOLUTION INTRAMUSCULAR; INTRAVENOUS; SUBCUTANEOUS
Status: DISPENSED
Start: 2018-02-13 | End: 2018-02-13

## 2018-02-13 RX ORDER — HYDROMORPHONE HYDROCHLORIDE 2 MG/ML
1 INJECTION, SOLUTION INTRAMUSCULAR; INTRAVENOUS; SUBCUTANEOUS ONCE
Status: DISCONTINUED | OUTPATIENT
Start: 2018-02-13 | End: 2018-02-13

## 2018-02-13 RX ORDER — HYDROMORPHONE HCL 2 MG/ML
VIAL (ML) INJECTION
Status: DISPENSED
Start: 2018-02-13 | End: 2018-02-14

## 2018-02-13 RX ORDER — HYDROMORPHONE HYDROCHLORIDE 2 MG/ML
1 INJECTION, SOLUTION INTRAMUSCULAR; INTRAVENOUS; SUBCUTANEOUS EVERY 6 HOURS PRN
Status: DISCONTINUED | OUTPATIENT
Start: 2018-02-13 | End: 2018-02-13

## 2018-02-13 RX ORDER — OXYCODONE AND ACETAMINOPHEN 10; 325 MG/1; MG/1
1 TABLET ORAL EVERY 4 HOURS PRN
Qty: 10 TABLET | Refills: 0 | Status: SHIPPED | OUTPATIENT
Start: 2018-02-13 | End: 2018-02-26 | Stop reason: SDUPTHER

## 2018-02-13 RX ORDER — HYDROMORPHONE HYDROCHLORIDE 1 MG/ML
1 INJECTION, SOLUTION INTRAMUSCULAR; INTRAVENOUS; SUBCUTANEOUS ONCE
Status: COMPLETED | OUTPATIENT
Start: 2018-02-13 | End: 2018-02-13

## 2018-02-13 RX ORDER — HYDROMORPHONE HYDROCHLORIDE 2 MG/1
4 TABLET ORAL EVERY 6 HOURS
Status: DISCONTINUED | OUTPATIENT
Start: 2018-02-13 | End: 2018-02-14 | Stop reason: HOSPADM

## 2018-02-13 RX ORDER — HYDROMORPHONE HYDROCHLORIDE 2 MG/ML
1 INJECTION, SOLUTION INTRAMUSCULAR; INTRAVENOUS; SUBCUTANEOUS ONCE
Status: COMPLETED | OUTPATIENT
Start: 2018-02-13 | End: 2018-02-13

## 2018-02-13 RX ORDER — DIPHENHYDRAMINE HYDROCHLORIDE 50 MG/ML
50 INJECTION INTRAMUSCULAR; INTRAVENOUS EVERY 6 HOURS PRN
Status: DISCONTINUED | OUTPATIENT
Start: 2018-02-13 | End: 2018-02-14 | Stop reason: HOSPADM

## 2018-02-13 RX ORDER — SODIUM CHLORIDE 9 MG/ML
INJECTION, SOLUTION INTRAVENOUS CONTINUOUS
Status: DISCONTINUED | OUTPATIENT
Start: 2018-02-13 | End: 2018-02-14 | Stop reason: HOSPADM

## 2018-02-13 RX ADMIN — HYDROMORPHONE HYDROCHLORIDE 4 MG: 2 TABLET ORAL at 06:02

## 2018-02-13 RX ADMIN — HYDROMORPHONE HYDROCHLORIDE 2 MG: 2 TABLET ORAL at 01:02

## 2018-02-13 RX ADMIN — GABAPENTIN 300 MG: 300 CAPSULE ORAL at 08:02

## 2018-02-13 RX ADMIN — SODIUM CHLORIDE: 0.9 INJECTION, SOLUTION INTRAVENOUS at 09:02

## 2018-02-13 RX ADMIN — STANDARDIZED SENNA CONCENTRATE AND DOCUSATE SODIUM 1 TABLET: 8.6; 5 TABLET, FILM COATED ORAL at 10:02

## 2018-02-13 RX ADMIN — BACLOFEN 10 MG: 10 TABLET ORAL at 10:02

## 2018-02-13 RX ADMIN — STANDARDIZED SENNA CONCENTRATE AND DOCUSATE SODIUM 1 TABLET: 8.6; 5 TABLET, FILM COATED ORAL at 08:02

## 2018-02-13 RX ADMIN — HYDROMORPHONE HYDROCHLORIDE 4 MG: 2 TABLET ORAL at 02:02

## 2018-02-13 RX ADMIN — HYDROMORPHONE HYDROCHLORIDE 1 MG: 2 INJECTION INTRAMUSCULAR; INTRAVENOUS; SUBCUTANEOUS at 03:02

## 2018-02-13 RX ADMIN — DIPHENHYDRAMINE HYDROCHLORIDE 25 MG: 50 INJECTION, SOLUTION INTRAMUSCULAR; INTRAVENOUS at 03:02

## 2018-02-13 RX ADMIN — AMLODIPINE BESYLATE 10 MG: 10 TABLET ORAL at 08:02

## 2018-02-13 RX ADMIN — HYDROMORPHONE HYDROCHLORIDE 4 MG: 2 TABLET ORAL at 11:02

## 2018-02-13 RX ADMIN — GABAPENTIN 300 MG: 300 CAPSULE ORAL at 10:02

## 2018-02-13 RX ADMIN — CARBAMAZEPINE 400 MG: 200 TABLET ORAL at 10:02

## 2018-02-13 RX ADMIN — DIPHENHYDRAMINE HYDROCHLORIDE 50 MG: 50 INJECTION, SOLUTION INTRAMUSCULAR; INTRAVENOUS at 03:02

## 2018-02-13 RX ADMIN — HYDROMORPHONE HYDROCHLORIDE 1 MG: 2 INJECTION INTRAMUSCULAR; INTRAVENOUS; SUBCUTANEOUS at 09:02

## 2018-02-13 RX ADMIN — HYDRALAZINE HYDROCHLORIDE 25 MG: 25 TABLET, FILM COATED ORAL at 07:02

## 2018-02-13 RX ADMIN — DIPHENHYDRAMINE HYDROCHLORIDE 50 MG: 50 INJECTION, SOLUTION INTRAMUSCULAR; INTRAVENOUS at 10:02

## 2018-02-13 RX ADMIN — FLUTICASONE FUROATE AND VILANTEROL TRIFENATATE 1 PUFF: 100; 25 POWDER RESPIRATORY (INHALATION) at 08:02

## 2018-02-13 RX ADMIN — DIPHENHYDRAMINE HYDROCHLORIDE 25 MG: 50 INJECTION, SOLUTION INTRAMUSCULAR; INTRAVENOUS at 09:02

## 2018-02-13 RX ADMIN — BACLOFEN 10 MG: 10 TABLET ORAL at 08:02

## 2018-02-13 RX ADMIN — CARBAMAZEPINE 400 MG: 200 TABLET ORAL at 08:02

## 2018-02-13 RX ADMIN — HYDROMORPHONE HYDROCHLORIDE 2 MG: 2 TABLET ORAL at 07:02

## 2018-02-13 RX ADMIN — HYDROMORPHONE HYDROCHLORIDE 1 MG: 1 INJECTION, SOLUTION INTRAMUSCULAR; INTRAVENOUS; SUBCUTANEOUS at 10:02

## 2018-02-13 NOTE — PROGRESS NOTES
Progress Note   Hospital Medicine       Team: [facility] HOSP MED E Korey Stanley PA-C  Admit Date: 2/12/2018  Length of Stay:  LOS: 1 day   ALYSE 2/14/2018  Principal Problem:  Sickle cell anemia with pain    Interval hx / overnight events:   Patient with complaints of persistent pain despite PO pain medications. Reports no sleep overnight due to pain and itching.     Areas of concern/ handoff: Pain control     Review of Systems   Constitutional: Negative.    HENT: Negative.    Eyes: Negative.    Respiratory: Negative.    Cardiovascular: Negative.    Gastrointestinal: Negative.    Musculoskeletal: Positive for myalgias (lower extremity L>R).   Skin: Positive for itching.   Psychiatric/Behavioral: The patient is nervous/anxious.        Temp:  [98 °F (36.7 °C)-98.8 °F (37.1 °C)]   Pulse:  []   Resp:  [16-20]   BP: (139-201)/()   SpO2:  [93 %-98 %]       Physical Exam   Constitutional: She is oriented to person, place, and time. She appears distressed (mildly).   Tearful due to persistent pain   Eyes: EOM are normal. Pupils are equal, round, and reactive to light.   Neck: Normal range of motion.   Cardiovascular: Normal rate and regular rhythm.    Pulmonary/Chest: Effort normal. No respiratory distress.   Abdominal: Soft. Bowel sounds are normal.   Musculoskeletal: Normal range of motion. She exhibits no edema.   Neurological: She is alert and oriented to person, place, and time. No cranial nerve deficit.   Psychiatric: Affect normal.       Recent Labs  Lab 02/12/18  0700 02/13/18  0454   WBC 8.09 8.92   HGB 11.9* 11.8*   HCT 34.7* 34.8*    268       Recent Labs  Lab 02/12/18  0700 02/13/18  0454   * 137   K 3.4* 3.9    103   CO2 25 23   BUN 13 7   CREATININE 0.8 0.7    102   CALCIUM 8.9 8.4*   MG  --  2.0   PHOS  --  2.7       Recent Labs  Lab 02/12/18  0700   ALKPHOS 108   ALT 15   AST 16   ALBUMIN 3.6   PROT 7.7   BILITOT 0.3     No results for input(s): POCTGLUCOSE in the last 168  hours.    Other diagnostic tests: none     amLODIPine  10 mg Oral Daily    baclofen  10 mg Oral BID    carBAMazepine  400 mg Oral BID    fluticasone-vilanterol  1 puff Inhalation Daily    gabapentin  300 mg Oral BID    HYDROmorphone        HYDROmorphone  4 mg Oral Q6H    senna-docusate 8.6-50 mg  1 tablet Oral BID       Assessment and Plan     Active Hospital Problems    Diagnosis  POA    *Sickle cell anemia with pain [D57.00]  Yes    Opioid dependence [F11.20]  Yes    Benign essential HTN [I10]  Yes      Resolved Hospital Problems    Diagnosis Date Resolved POA   No resolved problems to display.       Overview:  Trushanda Encalade was placed in observation for sickle cell anemia crisis. Patient remained hypertensive, likely secondary to persistent pain. She was started on PO narcotics with minimal relief (PO Oxycodone transitioned to PO Dilaudid). IV Dilaudid for breakthrough as patient remained mildly distressed. Anticipate dc tomorrow AM.      Problems Addressed today: pain      HIGH RISK CONDITION(S):     Patient is currently receiving parenteral controlled substances: Dilaudid     DVT/ GI PPX: early ambulation     Goals of Care: pain control, IVF hydration    Disposition: anticipate dc in the AM    Time spent in care of the patient (Greater than 1/2 spent in direct face-to-face contact) 20 minutes    Korey Stanley PA-C

## 2018-02-13 NOTE — NURSING
Remains aaox4. Pt received PRN and scheduled pain medication all through out the night and stated the medication does not relieve her. The pt stated the IV Dilaudid helps her better,   discussed this with Dr. Pee CHAVIS. he stated he would relay the message to the team. Vital signs stable, no s/s of distress.

## 2018-02-14 VITALS
HEIGHT: 63 IN | HEART RATE: 74 BPM | SYSTOLIC BLOOD PRESSURE: 147 MMHG | RESPIRATION RATE: 16 BRPM | TEMPERATURE: 98 F | OXYGEN SATURATION: 94 % | WEIGHT: 293 LBS | DIASTOLIC BLOOD PRESSURE: 82 MMHG | BODY MASS INDEX: 51.91 KG/M2

## 2018-02-14 LAB
ANION GAP SERPL CALC-SCNC: 9 MMOL/L
BASOPHILS # BLD AUTO: 0.04 K/UL
BASOPHILS NFR BLD: 0.6 %
BUN SERPL-MCNC: 9 MG/DL
CALCIUM SERPL-MCNC: 8.4 MG/DL
CHLORIDE SERPL-SCNC: 105 MMOL/L
CO2 SERPL-SCNC: 27 MMOL/L
CREAT SERPL-MCNC: 0.7 MG/DL
DIFFERENTIAL METHOD: ABNORMAL
EOSINOPHIL # BLD AUTO: 0.5 K/UL
EOSINOPHIL NFR BLD: 8.1 %
ERYTHROCYTE [DISTWIDTH] IN BLOOD BY AUTOMATED COUNT: 15.9 %
EST. GFR  (AFRICAN AMERICAN): >60 ML/MIN/1.73 M^2
EST. GFR  (NON AFRICAN AMERICAN): >60 ML/MIN/1.73 M^2
GLUCOSE SERPL-MCNC: 89 MG/DL
HCT VFR BLD AUTO: 33.8 %
HGB BLD-MCNC: 11 G/DL
IMM GRANULOCYTES # BLD AUTO: 0.01 K/UL
IMM GRANULOCYTES NFR BLD AUTO: 0.2 %
LYMPHOCYTES # BLD AUTO: 2.6 K/UL
LYMPHOCYTES NFR BLD: 41.9 %
MCH RBC QN AUTO: 23.2 PG
MCHC RBC AUTO-ENTMCNC: 32.5 G/DL
MCV RBC AUTO: 71 FL
MONOCYTES # BLD AUTO: 0.6 K/UL
MONOCYTES NFR BLD: 9.4 %
NEUTROPHILS # BLD AUTO: 2.5 K/UL
NEUTROPHILS NFR BLD: 39.8 %
NRBC BLD-RTO: 1 /100 WBC
PLATELET # BLD AUTO: 249 K/UL
PMV BLD AUTO: 10.7 FL
POTASSIUM SERPL-SCNC: 3.9 MMOL/L
RBC # BLD AUTO: 4.75 M/UL
SODIUM SERPL-SCNC: 141 MMOL/L
WBC # BLD AUTO: 6.27 K/UL

## 2018-02-14 PROCEDURE — 99239 HOSP IP/OBS DSCHRG MGMT >30: CPT | Mod: ,,, | Performed by: PHYSICIAN ASSISTANT

## 2018-02-14 PROCEDURE — 25000003 PHARM REV CODE 250: Performed by: PHYSICIAN ASSISTANT

## 2018-02-14 PROCEDURE — 80048 BASIC METABOLIC PNL TOTAL CA: CPT

## 2018-02-14 PROCEDURE — 85025 COMPLETE CBC W/AUTO DIFF WBC: CPT

## 2018-02-14 PROCEDURE — 36415 COLL VENOUS BLD VENIPUNCTURE: CPT

## 2018-02-14 PROCEDURE — 25000003 PHARM REV CODE 250: Performed by: STUDENT IN AN ORGANIZED HEALTH CARE EDUCATION/TRAINING PROGRAM

## 2018-02-14 RX ADMIN — HYDROMORPHONE HYDROCHLORIDE 2 MG: 2 TABLET ORAL at 09:02

## 2018-02-14 RX ADMIN — CARBAMAZEPINE 400 MG: 200 TABLET ORAL at 09:02

## 2018-02-14 RX ADMIN — FLUTICASONE FUROATE AND VILANTEROL TRIFENATATE 1 PUFF: 100; 25 POWDER RESPIRATORY (INHALATION) at 09:02

## 2018-02-14 RX ADMIN — BACLOFEN 10 MG: 10 TABLET ORAL at 09:02

## 2018-02-14 RX ADMIN — STANDARDIZED SENNA CONCENTRATE AND DOCUSATE SODIUM 1 TABLET: 8.6; 5 TABLET, FILM COATED ORAL at 09:02

## 2018-02-14 RX ADMIN — AMLODIPINE BESYLATE 10 MG: 10 TABLET ORAL at 09:02

## 2018-02-14 RX ADMIN — GABAPENTIN 300 MG: 300 CAPSULE ORAL at 09:02

## 2018-02-14 RX ADMIN — HYDROMORPHONE HYDROCHLORIDE 4 MG: 2 TABLET ORAL at 01:02

## 2018-02-14 NOTE — PLAN OF CARE
02/14/2018      Nazanin Malone  [address] [facility] [address]          Hospital Medicine Dept.  [facility] [address] [place] [id number] [phone] after hours  [phone] fax Nazanin Malone has been hospitalized at the [facility] since 2/12/2018.  Please excuse the patient from duties.  Patient may return on 12/16/18.  No restrictions.     Please contact me if you have any questions.                  __________________________  Korey Satnley PA-C  02/14/2018

## 2018-02-14 NOTE — DISCHARGE SUMMARY
Discharge Summary  Hospital Medicine    Attending Provider on Discharge: Korey Stanley PA-C  [facility] Medicine Team: [facility]OS[facility] MED E  Date of Admission:  2/12/2018     Date of Discharge:  2/14/2018    Active Hospital Problems    Diagnosis  POA    *Sickle cell anemia with pain [D57.00]  Yes    Opioid dependence [F11.20]  Yes    Benign essential HTN [I10]  Yes      Resolved Hospital Problems    Diagnosis Date Resolved POA   No resolved problems to display.       History of the Present Illness:      Patient is a 38yo AAF with a PMHx of HTN and sickle cell being admitted to medicine for evaluation of sickle cell pain crisis. Patient states that she woke up this morning at 3am in a pain crisis. Patient describes the pain as constant, sharp, and severe to her left lower extremity hip to toe. Her pain is worsened by touching and weight bearing. She took two 10mg percocets with no relief. Patient denies fevers, chills, chest pain, abdominal pain, shortness of breath, and nausea. Patient states this episode is like her typical pain crisis that occurs 1-3 times a year. Of note, patient follows with Heme/Onc for pain management with sickle cell disease.     In the ED, patient hypertensive likely due to pain. Intake labs remarkable for K 3.4, given electrolyte replacement. Pain control administered with 30mg Oxy ER and 30mg Oxy IR. 2 liters fluid given    Hospital Course of Principle Problem Addressed:       Nazanin Malone was placed in observation for sickle cell anemia crisis. She complained of left lower extremity pain. Lower extremity doppler without concern for DVT. Patient remained hypertensive, likely secondary to persistent pain. She was started on PO narcotics with minimal relief (PO Oxycodone transitioned to PO Dilaudid). IV Dilaudid for breakthrough as patient remained mildly distressed. Pain improved prior to discharge. Stable for discharge home with adequate follow-up.        Other Medical Problems  Addressed in the Hospital:      Sickle-cell disease with pain    Benign essential HTN    Opioid dependence       Significant diagnostic tests       Recent Labs  Lab 02/12/18  0700 02/13/18  0454 02/14/18  0513   WBC 8.09 8.92 6.27   HGB 11.9* 11.8* 11.0*   HCT 34.7* 34.8* 33.8*    268 249       Recent Labs  Lab 02/12/18  0700 02/13/18  0454 02/14/18  0513   * 137 141   K 3.4* 3.9 3.9    103 105   CO2 25 23 27   BUN 13 7 9   CREATININE 0.8 0.7 0.7   CALCIUM 8.9 8.4* 8.4*   MG  --  2.0  --    PHOS  --  2.7  --    PROT 7.7  --   --    BILITOT 0.3  --   --    ALKPHOS 108  --   --    ALT 15  --   --    AST 16  --   --        Special Treatments/ Procedures:      none    Discharge Medications:          Discharge Medication List as of 2/14/2018 12:05 PM      CONTINUE these medications which have CHANGED    Details   oxyCODONE-acetaminophen (PERCOCET)  mg per tablet Take 1 tablet by mouth every 4 (four) hours as needed for Pain., Starting Tue 2/13/2018, Print         CONTINUE these medications which have NOT CHANGED    Details   albuterol (ACCUNEB) 0.63 mg/3 mL Nebu Take 0.63 mg by nebulization every 6 (six) hours as needed (for wheezing/shortness of breath). Rescue , Historical Med      albuterol (VENTOLIN HFA) 90 mcg/actuation inhaler Inhale 2 puffs into the lungs every 6 (six) hours as needed for Wheezing. Rescue, Historical Med      amlodipine (NORVASC) 10 MG tablet Take 1 tablet (10 mg total) by mouth once daily., Starting Mon 7/31/2017, Until Tue 7/31/2018, Normal      baclofen (LIORESAL) 10 MG tablet Take 1 tablet (10 mg total) by mouth 2 (two) times daily., Starting Tue 7/18/2017, Normal      budesonide-formoterol 160-4.5 mcg (SYMBICORT) 160-4.5 mcg/actuation HFAA Inhale 2 puffs into the lungs every 12 (twelve) hours. Controller, Starting 5/5/2017, Until Sat 5/5/18, Normal      carbamazepine (TEGRETOL) 200 mg tablet Take 2 tablets (400 mg total) by mouth 2 (two) times daily., Starting Tue  7/18/2017, Normal      fluoxetine (PROZAC) 40 MG capsule Take 40 mg by mouth once daily., Starting 2/13/2017, Until Discontinued, Historical Med      gabapentin (NEURONTIN) 300 MG capsule Take 1 capsule (300 mg total) by mouth 2 (two) times daily., Starting Tue 7/18/2017, Normal      hydrochlorothiazide (MICROZIDE) 12.5 mg capsule Take 1 capsule (12.5 mg total) by mouth once daily., Starting Fri 10/20/2017, Until Sat 10/20/2018, Normal      ondansetron (ZOFRAN-ODT) 8 MG TbDL Take 1 tablet (8 mg total) by mouth every 6 (six) hours as needed., Starting 3/10/2017, Until Discontinued, Normal      senna-docusate 8.6-50 mg (PERICOLACE) 8.6-50 mg per tablet Take 1 tablet by mouth 2 (two) times daily., Starting 5/5/2017, Until Discontinued, OTC      ergocalciferol (VITAMIN D2) 50,000 unit Cap Take 1 capsule (50,000 Units total) by mouth every 7 days., Starting Mon 5/22/2017, Until Tue 5/22/2018, Normal      hydroxyurea (HYDREA) 500 mg Cap Take 1 capsule (500 mg total) by mouth once daily., Starting Fri 6/23/2017, Until Sat 6/23/2018, Normal             Discharge Diet:regular diet with Normal Fluid intake of 1500 - 2000 mL per day    Activity: activity as tolerated    Discharge Condition: Stable    Disposition: Home or Self Care    Tests pending at the time of discharge: none      Time spent  on the discharge of the patient including review of hospital course with the patient. reviewing discharge medications and arranging follow-up care >30 minutes     Discharge examination Patient was seen and examined on the date of discharge and determined to be suitable for discharge.      Korey Stanley PA-C

## 2018-02-14 NOTE — NURSING
Pt discharged home at this time. Explained and given copy of discharge instructions. Pt verbalized understanding.

## 2018-02-14 NOTE — PLAN OF CARE
02/14/18 1235   Final Note   Assessment Type Final Discharge Note     Patient discharged home with no needs 2/14/18.

## 2018-02-26 ENCOUNTER — TELEPHONE (OUTPATIENT)
Dept: HEMATOLOGY/ONCOLOGY | Facility: CLINIC | Age: 38
End: 2018-02-26

## 2018-02-26 ENCOUNTER — PATIENT MESSAGE (OUTPATIENT)
Dept: HEMATOLOGY/ONCOLOGY | Facility: CLINIC | Age: 38
End: 2018-02-26

## 2018-02-26 DIAGNOSIS — D57.40 SICKLE CELL BETA THALASSEMIA: ICD-10-CM

## 2018-02-26 DIAGNOSIS — G50.0 TRIGEMINAL NEURALGIA: Primary | ICD-10-CM

## 2018-02-26 RX ORDER — OXYCODONE AND ACETAMINOPHEN 10; 325 MG/1; MG/1
1 TABLET ORAL EVERY 6 HOURS PRN
Qty: 120 TABLET | Refills: 0 | Status: SHIPPED | OUTPATIENT
Start: 2018-02-26 | End: 2018-03-27 | Stop reason: SDUPTHER

## 2018-02-26 NOTE — TELEPHONE ENCOUNTER
Spoke with Ms. Malone regarding her Percocet prescription. Pt stated that she would like to have [facility] as her pharmacy of choice for her pain medication. Pt also stated that she will  the Percocet from [facility] today but she prefers to have it sent to [facility] in the future. I verbalized understanding.

## 2018-02-26 NOTE — TELEPHONE ENCOUNTER
Spoke with Ms. Malone regarding her Percocet prescription. Pt stated that she does not want her Percocet prescription refill canceled. I verbalized understanding. Pt also stated she would like to have [facility] as her pharmacy of choice for her pain medication. Pt also stated that she will  the Percocet from [facility] today but she prefers to have it sent to [facility] in the future. I verbalized understanding.

## 2018-02-26 NOTE — TELEPHONE ENCOUNTER
----- Message from Estrella Alvarado sent at 2/26/2018  3:12 PM CST -----  Contact: Self  Pt is calling to speak with Staff because she does not want Staff to cancel her prescription.  She is requesting a returned call.    She can be reached at [phone].    Thank you.

## 2018-02-26 NOTE — TELEPHONE ENCOUNTER
Spoke with Ms. Back and informed her that her Percocet was approved and called into her Pharmacy. Ms. Back verbalized understanding.

## 2018-02-26 NOTE — TELEPHONE ENCOUNTER
----- Message from Estrella Alvarado sent at 2/26/2018  3:12 PM CST -----  Contact: Self  Pt is calling to speak with Staff because she does not want Staff to cancel her prescription.  She is requesting a returned call.    She can be reached at [phone].    Thank you.

## 2018-02-26 NOTE — TELEPHONE ENCOUNTER
Spoke with MsDelano Kira regarding her request for a referral to Neurology. Informed pt that because of her Medicaid, no provider can see her for the rest of the year and that she will be put on a waiting list. Pt verbalized understanding.       Pt asked if Dr. Montgomery can increase her Carbamazepine dosage because her current dosage is not responding to the trigeminal neuralgia. Pt states, although pain is not constant, it is very frequent and with medication it was gone as long as I took my medication.  Put together with my sickle cell pain, the pain is very uncomfortable. I informed pt that I will let Dr. Montgomery know, pt verbalized understanding.

## 2018-03-09 ENCOUNTER — TELEPHONE (OUTPATIENT)
Dept: HEMATOLOGY/ONCOLOGY | Facility: CLINIC | Age: 38
End: 2018-03-09

## 2018-03-09 NOTE — TELEPHONE ENCOUNTER
----- Message from Estrella Alvarado sent at 3/9/2018 12:43 PM CST -----  Contact: Self  Pt is calling to speak with Staff regarding her next appt.    She can be reached at [phone].    Thank you.  Spoke with pt, scheduled an appointment on 03/15/18, labs due at 320pm followed by a clinic visit at 420pm , pt verbalized understanding.  Dayna

## 2018-03-12 ENCOUNTER — OFFICE VISIT (OUTPATIENT)
Dept: INTERNAL MEDICINE | Facility: CLINIC | Age: 38
End: 2018-03-12
Attending: FAMILY MEDICINE
Payer: MEDICAID

## 2018-03-12 VITALS
DIASTOLIC BLOOD PRESSURE: 80 MMHG | HEIGHT: 63 IN | OXYGEN SATURATION: 97 % | SYSTOLIC BLOOD PRESSURE: 130 MMHG | BODY MASS INDEX: 51.91 KG/M2 | TEMPERATURE: 99 F | HEART RATE: 85 BPM | WEIGHT: 293 LBS

## 2018-03-12 DIAGNOSIS — R11.0 NAUSEA: ICD-10-CM

## 2018-03-12 DIAGNOSIS — J45.40 MODERATE PERSISTENT ASTHMA WITHOUT COMPLICATION: Primary | ICD-10-CM

## 2018-03-12 DIAGNOSIS — I10 BENIGN ESSENTIAL HTN: ICD-10-CM

## 2018-03-12 DIAGNOSIS — D57.40 SICKLE CELL BETA THALASSEMIA: ICD-10-CM

## 2018-03-12 DIAGNOSIS — F43.20 ADJUSTMENT DISORDER, UNSPECIFIED TYPE: ICD-10-CM

## 2018-03-12 DIAGNOSIS — G50.0 TRIGEMINAL NEURALGIA: ICD-10-CM

## 2018-03-12 DIAGNOSIS — D50.9 IRON DEFICIENCY ANEMIA, UNSPECIFIED IRON DEFICIENCY ANEMIA TYPE: ICD-10-CM

## 2018-03-12 PROCEDURE — 99213 OFFICE O/P EST LOW 20 MIN: CPT | Mod: PBBFAC,PO | Performed by: INTERNAL MEDICINE

## 2018-03-12 PROCEDURE — 99214 OFFICE O/P EST MOD 30 MIN: CPT | Mod: S$PBB,,, | Performed by: INTERNAL MEDICINE

## 2018-03-12 PROCEDURE — 99999 PR PBB SHADOW E&M-EST. PATIENT-LVL III: CPT | Mod: PBBFAC,,, | Performed by: INTERNAL MEDICINE

## 2018-03-12 RX ORDER — ONDANSETRON 8 MG/1
8 TABLET, ORALLY DISINTEGRATING ORAL EVERY 6 HOURS PRN
Qty: 25 TABLET | Refills: 0 | Status: CANCELLED | OUTPATIENT
Start: 2018-03-12

## 2018-03-12 RX ORDER — CARBAMAZEPINE 400 MG/1
TABLET, EXTENDED RELEASE ORAL
COMMUNITY
Start: 2017-12-26 | End: 2018-03-12

## 2018-03-12 RX ORDER — GABAPENTIN 300 MG/1
300 CAPSULE ORAL 2 TIMES DAILY
Qty: 60 CAPSULE | Refills: 5 | Status: SHIPPED | OUTPATIENT
Start: 2018-03-12 | End: 2018-03-20

## 2018-03-12 RX ORDER — FLUOXETINE HYDROCHLORIDE 40 MG/1
40 CAPSULE ORAL DAILY
Qty: 30 CAPSULE | Refills: 2 | Status: SHIPPED | OUTPATIENT
Start: 2018-03-12 | End: 2018-11-27 | Stop reason: SDUPTHER

## 2018-03-12 RX ORDER — HYDROXYZINE HYDROCHLORIDE 25 MG/1
TABLET, FILM COATED ORAL
Status: ON HOLD | COMMUNITY
Start: 2018-02-16 | End: 2019-04-30 | Stop reason: ALTCHOICE

## 2018-03-12 RX ORDER — BUDESONIDE AND FORMOTEROL FUMARATE DIHYDRATE 160; 4.5 UG/1; UG/1
2 AEROSOL RESPIRATORY (INHALATION) EVERY 12 HOURS
Qty: 10.2 G | Refills: 2 | Status: SHIPPED | OUTPATIENT
Start: 2018-03-12 | End: 2018-06-11 | Stop reason: SDUPTHER

## 2018-03-12 RX ORDER — BUDESONIDE AND FORMOTEROL FUMARATE DIHYDRATE 80; 4.5 UG/1; UG/1
AEROSOL RESPIRATORY (INHALATION)
COMMUNITY
Start: 2018-02-19 | End: 2018-03-12

## 2018-03-12 RX ORDER — CARBAMAZEPINE 200 MG/1
600 TABLET ORAL 2 TIMES DAILY
Qty: 180 TABLET | Refills: 2 | Status: SHIPPED | OUTPATIENT
Start: 2018-03-12 | End: 2018-03-20 | Stop reason: SDUPTHER

## 2018-03-12 RX ORDER — ALBUTEROL SULFATE 90 UG/1
2 AEROSOL, METERED RESPIRATORY (INHALATION) EVERY 6 HOURS PRN
Qty: 18 G | Refills: 2 | Status: SHIPPED | OUTPATIENT
Start: 2018-03-12 | End: 2018-05-05

## 2018-03-12 RX ORDER — BACLOFEN 10 MG/1
10 TABLET ORAL 2 TIMES DAILY
Qty: 60 TABLET | Refills: 2 | Status: SHIPPED | OUTPATIENT
Start: 2018-03-12 | End: 2019-04-25

## 2018-03-12 NOTE — Clinical Note
Hi, I saw her Monday for med refills, hopefully she will see you next week, hopefully overtime we can help her work on the polypharmacy. ED

## 2018-03-12 NOTE — PROGRESS NOTES
Subjective:       Patient ID: Nazanin Malone is a 37 y.o. female.    Chief Complaint: Medication Problem (Tegretol)    Here for med refills.    Feels like trigem neuroalgia is atcing up. Has self increased tegretol. Only affects R side of the face.    Much stress with her dtr.    Feels like she needs something to calm down.      Review of Systems   Constitutional: Negative for activity change, diaphoresis and fatigue.   HENT: Negative for congestion.         R sided facial pain   Respiratory: Negative for chest tightness.    Cardiovascular: Negative for chest pain, palpitations and leg swelling.   Gastrointestinal: Negative for abdominal distention and abdominal pain.   Genitourinary: Negative for difficulty urinating and dysuria.   Skin: Negative for rash and wound.   Psychiatric/Behavioral: The patient is nervous/anxious.        Objective:      Physical Exam   Constitutional: She is oriented to person, place, and time. She appears well-developed and well-nourished. No distress.   HENT:   Head: Normocephalic and atraumatic.   Nose: Nose normal.   Mouth/Throat: No oropharyngeal exudate.   Eyes: EOM are normal. Pupils are equal, round, and reactive to light.   Neck: Normal range of motion. Neck supple.   Cardiovascular: Normal rate, regular rhythm and normal heart sounds.    Pulmonary/Chest: Effort normal and breath sounds normal. She has no wheezes.   Abdominal: Soft. Bowel sounds are normal. She exhibits no distension. There is no tenderness.   Musculoskeletal: She exhibits no edema.   Neurological: She is alert and oriented to person, place, and time.   Skin: No rash noted. She is not diaphoretic.   Psychiatric: She has a normal mood and affect. Her behavior is normal.       Assessment:       1. Moderate persistent asthma without complication    2. Sickle cell beta thalassemia    3. Nausea    4. Benign essential HTN    5. Iron deficiency anemia, unspecified iron deficiency anemia type    6. Adjustment  disorder, unspecified type    7. Trigeminal neuralgia        Plan:       Nazanin was seen today for medication problem.    Diagnoses and all orders for this visit:    Moderate persistent asthma without complication  -     albuterol (VENTOLIN HFA) 90 mcg/actuation inhaler; Inhale 2 puffs into the lungs every 6 (six) hours as needed for Wheezing. Rescue  -     budesonide-formoterol 160-4.5 mcg (SYMBICORT) 160-4.5 mcg/actuation HFAA; Inhale 2 puffs into the lungs every 12 (twelve) hours. Controller  Now btr controlled    Sickle cell beta thalassemia  -     gabapentin (NEURONTIN) 300 MG capsule; Take 1 capsule (300 mg total) by mouth 2 (two) times daily.  -     carBAMazepine (TEGRETOL) 200 mg tablet; Take 3 tablets (600 mg total) by mouth 2 (two) times daily.  -     baclofen (LIORESAL) 10 MG tablet; Take 1 tablet (10 mg total) by mouth 2 (two) times daily.    Nausea    Benign essential HTN  Controlled on meds    Iron deficiency anemia, unspecified iron deficiency anemia type    Adjustment disorder, unspecified type  -     FLUoxetine (PROZAC) 40 MG capsule; Take 1 capsule (40 mg total) by mouth once daily.    Trigeminal neuralgia  -     Ambulatory Referral to Neurology    Polypharmacy -- hopefully she can get off gabapentin and zofran in future.    Other orders  -     Cancel: ondansetron (ZOFRAN-ODT) 8 MG TbDL; Take 1 tablet (8 mg total) by mouth every 6 (six) hours as needed.        Health Maintenance       Date Due Completion Date    Lipid Panel 1980 ---    TETANUS VACCINE 04/14/1996 ---    Sign Pain Contract 04/14/1996 ---    Complete Opioid Risk Tool 04/14/1996 ---    Influenza Vaccine 08/01/2017 ---    Pneumococcal PPSV23 (High Risk) (1) 08/18/2017 ---    Pap Smear with HPV Cotest 11/17/2020 11/17/2017          Follow-up in about 1 week (around 3/19/2018) for Dr. Forbes 1 week.

## 2018-03-15 ENCOUNTER — TELEPHONE (OUTPATIENT)
Dept: HEMATOLOGY/ONCOLOGY | Facility: CLINIC | Age: 38
End: 2018-03-15

## 2018-03-15 NOTE — TELEPHONE ENCOUNTER
----- Message from Eugene Clark sent at 3/15/2018  3:53 PM CDT -----  Contact: Pt   Pt will like to reschedule today's appt due to not feeling well. Will like to come in on tomorrow if possible     Contact::[phone]  Spoke with pt and reschedule appointment to 04/10/18, per pt's request. Labs due at 230pm followed by a clinic visit at 330pm, pt verbalized understanding.  Dayna

## 2018-03-20 ENCOUNTER — OFFICE VISIT (OUTPATIENT)
Dept: NEUROLOGY | Facility: CLINIC | Age: 38
End: 2018-03-20
Payer: MEDICAID

## 2018-03-20 VITALS — WEIGHT: 293 LBS | HEIGHT: 63 IN | BODY MASS INDEX: 51.91 KG/M2

## 2018-03-20 DIAGNOSIS — F41.9 ANXIETY: ICD-10-CM

## 2018-03-20 DIAGNOSIS — G50.0 TRIGEMINAL NEURALGIA: ICD-10-CM

## 2018-03-20 PROCEDURE — 99213 OFFICE O/P EST LOW 20 MIN: CPT | Mod: PBBFAC | Performed by: PSYCHIATRY & NEUROLOGY

## 2018-03-20 PROCEDURE — 99999 PR PBB SHADOW E&M-EST. PATIENT-LVL III: CPT | Mod: PBBFAC,,, | Performed by: PSYCHIATRY & NEUROLOGY

## 2018-03-20 PROCEDURE — 99205 OFFICE O/P NEW HI 60 MIN: CPT | Mod: S$PBB,,, | Performed by: PSYCHIATRY & NEUROLOGY

## 2018-03-20 RX ORDER — MIRTAZAPINE 45 MG/1
45 TABLET, ORALLY DISINTEGRATING ORAL NIGHTLY
Qty: 30 TABLET | Refills: 11 | Status: SHIPPED | OUTPATIENT
Start: 2018-03-20 | End: 2019-03-30 | Stop reason: SDUPTHER

## 2018-03-20 RX ORDER — GABAPENTIN 800 MG/1
800 TABLET ORAL 3 TIMES DAILY
Qty: 90 TABLET | Refills: 11 | Status: SHIPPED | OUTPATIENT
Start: 2018-03-20 | End: 2019-03-30 | Stop reason: SDUPTHER

## 2018-03-20 RX ORDER — CARBAMAZEPINE 200 MG/1
800 TABLET ORAL 2 TIMES DAILY
Qty: 240 TABLET | Refills: 3 | Status: SHIPPED | OUTPATIENT
Start: 2018-03-20 | End: 2018-07-15 | Stop reason: SDUPTHER

## 2018-03-20 RX ORDER — CLONAZEPAM 2 MG/1
2 TABLET ORAL DAILY
Qty: 7 TABLET | Refills: 0 | Status: ON HOLD | OUTPATIENT
Start: 2018-03-20 | End: 2019-04-30 | Stop reason: ALTCHOICE

## 2018-03-20 NOTE — PROGRESS NOTES
[facility] NEUROLOGY  [facility] [place]    Date: March 20, 2018   Patient Name: Nazanin Malone   MRN: [id number]   PCP: Balta Forbes  Referring Provider: Rusty Mcfarlane MD    Assessment:      This is Nazanin Malone, 37 y.o. female with sickle cell disease with recent pain crisis and refractory trigeminal neuralgia.     Plan:      Increase GBP to 800mg tid, continue CBZ 800mg bid  Referral to pain management  Agreed to one week course of clonazepam as this has provided relief in the past, she expressed understanding that this would not be continued long term  Remeron 45mg qha  Baclofen prn       I discussed side effects of the medications. I asked the patient to  stop the medication if she notices serious adverse effects as we discussed and to seek immediate medical attention at an ER.     Feliciano Montemayor MD  [facility]   Department of Neurology    Subjective:        HPI:   Ms. Nazanin Malone is a 37 y.o. female who presents with a chief complaint of TN    Patient has had difficulty with right facial pain since 2015 and has been on CBZ and GBP since that time although pain has increased in the past several months prompting her to increased CBZ to 1600mg/d with partial relief.  She notes significant recent stress related to her daughter who has chronic behavioral issues and has recently become involved in sex trafficking and contacted the FBI after being physically threatened.    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 4/29/2017    Asthma     Depression     Hypertension     Morbid obesity     Opioid dependence 4/16/2017    Pneumonia due to Streptococcus pneumoniae 4/25/2017    Sepsis due to Streptococcus pneumoniae 4/25/2017    Sickle cell-beta thalassemia disease with pain     Trigeminal neuralgia        PAST SURGICAL  HISTORY:  Past Surgical History:   Procedure Laterality Date     SECTION      TONSILLECTOMY      TUBAL LIGATION         CURRENT MEDS:  Current Outpatient Prescriptions   Medication Sig Dispense Refill    albuterol (ACCUNEB) 0.63 mg/3 mL Nebu Take 0.63 mg by nebulization every 6 (six) hours as needed (for wheezing/shortness of breath). Rescue       albuterol (VENTOLIN HFA) 90 mcg/actuation inhaler Inhale 2 puffs into the lungs every 6 (six) hours as needed for Wheezing. Rescue 18 g 2    amlodipine (NORVASC) 10 MG tablet Take 1 tablet (10 mg total) by mouth once daily. 30 tablet 3    baclofen (LIORESAL) 10 MG tablet Take 1 tablet (10 mg total) by mouth 2 (two) times daily. 60 tablet 2    budesonide-formoterol 160-4.5 mcg (SYMBICORT) 160-4.5 mcg/actuation HFAA Inhale 2 puffs into the lungs every 12 (twelve) hours. Controller 10.2 g 2    carBAMazepine (TEGRETOL) 200 mg tablet Take 3 tablets (600 mg total) by mouth 2 (two) times daily. 180 tablet 2    ergocalciferol (VITAMIN D2) 50,000 unit Cap Take 1 capsule (50,000 Units total) by mouth every 7 days. (Patient taking differently: Take 50,000 Units by mouth every Monday. ) 12 capsule 3    FLUoxetine (PROZAC) 40 MG capsule Take 1 capsule (40 mg total) by mouth once daily. 30 capsule 2    gabapentin (NEURONTIN) 300 MG capsule Take 1 capsule (300 mg total) by mouth 2 (two) times daily. 60 capsule 5    hydrochlorothiazide (MICROZIDE) 12.5 mg capsule Take 1 capsule (12.5 mg total) by mouth once daily. 30 capsule 11    hydroxyurea (HYDREA) 500 mg Cap Take 1 capsule (500 mg total) by mouth once daily. 60 capsule 2    hydrOXYzine HCl (ATARAX) 25 MG tablet       ondansetron (ZOFRAN-ODT) 8 MG TbDL Take 1 tablet (8 mg total) by mouth every 6 (six) hours as needed. 25 tablet 0    oxyCODONE-acetaminophen (PERCOCET)  mg per tablet Take 1 tablet by mouth every 6 (six) hours as needed for Pain. 120 tablet 0    senna-docusate 8.6-50 mg (PERICOLACE) 8.6-50  "mg per tablet Take 1 tablet by mouth 2 (two) times daily.       No current facility-administered medications for this visit.        ALLERGIES:  Review of patient's allergies indicates:  No Known Allergies    FAMILY HISTORY:  Family History   Problem Relation Age of Onset    Breast cancer Neg Hx     Ovarian cancer Neg Hx     Colon cancer Neg Hx        SOCIAL HISTORY:  Social History   Substance Use Topics    Smoking status: Never Smoker    Smokeless tobacco: Never Used    Alcohol use No       Review of Systems:  12 review of systems is negative except for the symptoms mentioned in HPI.        Objective:     Vitals:    03/20/18 0931   Weight: (!) 140.6 kg (310 lb)   Height: 5' 3" (1.6 m)       General: NAD, well nourished   Eyes: no tearing, discharge, no erythema   ENT: moist mucous membranes of the oral cavity, nares patent    Neck: Supple, full range of motion  Cardiovascular: Warm and well perfused, pulses equal and symmetrical  Lungs: Normal work of breathing, normal chest wall excursions  Skin: No rash, lesions, or breakdown on exposed skin  Psychiatry: Mood and affect are appropriate   Abdomen: soft, non tender, non distended  Extremeties: No cyanosis, clubbing or edema.    Neurological   MENTAL STATUS: Alert and oriented to person, place, and time. Attention and concentration within normal limits. Speech without dysarthria, able to name and repeat without difficulty. Recent and remote memory within normal limits   CRANIAL NERVES: Visual fields intact. PERRL. EOMI. Facial sensation intact. Face symmetrical. Hearing grossly intact. Full shoulder shrug bilaterally. Tongue protrudes midline   SENSORY: Sensation is intact to light touch throughout.  Negative Romberg.   MOTOR: Normal bulk and tone. No pronator drift.  5/5 deltoid, biceps, triceps, interosseous, hand  bilaterally. 5/5 iliopsoas, knee extension/flexion, foot dorsi/plantarflexion bilaterally.    REFLEXES: Symmetric and 2+ throughout. Toes " down going bilaterally.   CEREBELLAR/COORDINATION/GAIT: Gait steady with normal arm swing and stride length.  Heel to shin intact. Finger to nose intact. Normal rapid alternating movements.

## 2018-03-20 NOTE — PATIENT INSTRUCTIONS
CONTINUE TEGRETOL 800MG TWICE DAILY    INCREASE GABAPENTIN TO 800MG THREE TIMES DAILY    CONTINUE BACLOFEN AS NEEDED    START REMERON    FOLLOW UP WITH INTERVENTIONAL PAIN

## 2018-03-20 NOTE — LETTER
March 22, 2018      Rusty Mcfralane MD  [address]  [facility] [address]           [facility]  [address]  [facility] [address]  Phone: [phone]  Fax: [phone]          Patient: Nazanin Malone   MR Number: [id number]   YOB: 1980   Date of Visit: 3/20/2018       Dear Dr. Rusty Mcfarlane:    Thank you for referring Nazanin Malone to me for evaluation. Attached you will find relevant portions of my assessment and plan of care.    If you have questions, please do not hesitate to call me. I look forward to following Nazanin Malone along with you.    Sincerely,    Feliciano Montemayor MD    Enclosure  CC:  No Recipients    If you would like to receive this communication electronically, please contact [email or web address] or [phone] to request more information on [facility] access.    For providers and/or their staff who would like to refer a patient to [facility], please contact us through our one-stop-shop provider referral line, [facility], at [phone].    If you feel you have received this communication in error or would no longer like to receive these types of communications, please e-mail [email or web address]

## 2018-03-27 DIAGNOSIS — D57.40 SICKLE CELL BETA THALASSEMIA: ICD-10-CM

## 2018-03-27 RX ORDER — OXYCODONE AND ACETAMINOPHEN 10; 325 MG/1; MG/1
1 TABLET ORAL EVERY 6 HOURS PRN
Qty: 120 TABLET | Refills: 0 | Status: SHIPPED | OUTPATIENT
Start: 2018-03-27 | End: 2018-04-20 | Stop reason: SDUPTHER

## 2018-03-27 NOTE — TELEPHONE ENCOUNTER
----- Message from Eugene Clark sent at 3/27/2018  2:00 PM CDT -----  Contact: Pt   Pt is requesting refill on oxyCODONE-acetaminophen (PERCOCET)  mg per tablet    Pt Contact::[phone]  Spoke with pt, explained that her refill request was sent to Dr. Montgomery to sign, once signed it will be sent electronically to her pharmacy, pt verbalized understanding.  Dayna

## 2018-04-13 ENCOUNTER — OFFICE VISIT (OUTPATIENT)
Dept: HEMATOLOGY/ONCOLOGY | Facility: CLINIC | Age: 38
End: 2018-04-13
Payer: MEDICAID

## 2018-04-13 ENCOUNTER — LAB VISIT (OUTPATIENT)
Dept: LAB | Facility: HOSPITAL | Age: 38
End: 2018-04-13
Attending: INTERNAL MEDICINE
Payer: MEDICAID

## 2018-04-13 VITALS
RESPIRATION RATE: 20 BRPM | TEMPERATURE: 99 F | SYSTOLIC BLOOD PRESSURE: 198 MMHG | HEART RATE: 88 BPM | WEIGHT: 293 LBS | OXYGEN SATURATION: 97 % | HEIGHT: 63 IN | BODY MASS INDEX: 51.91 KG/M2 | DIASTOLIC BLOOD PRESSURE: 121 MMHG

## 2018-04-13 DIAGNOSIS — D57.40 SICKLE CELL BETA THALASSEMIA: ICD-10-CM

## 2018-04-13 DIAGNOSIS — D57.1 HB-SS DISEASE WITHOUT CRISIS: ICD-10-CM

## 2018-04-13 DIAGNOSIS — D57.40 SICKLE CELL BETA THALASSEMIA: Primary | ICD-10-CM

## 2018-04-13 DIAGNOSIS — E55.9 VITAMIN D DEFICIENCY: ICD-10-CM

## 2018-04-13 LAB
ABO + RH BLD: NORMAL
ALBUMIN SERPL BCP-MCNC: 3.5 G/DL
ALP SERPL-CCNC: 102 U/L
ALT SERPL W/O P-5'-P-CCNC: 24 U/L
ANION GAP SERPL CALC-SCNC: 8 MMOL/L
AST SERPL-CCNC: 25 U/L
BASOPHILS # BLD AUTO: 0.04 K/UL
BASOPHILS NFR BLD: 0.8 %
BILIRUB SERPL-MCNC: 0.4 MG/DL
BLD GP AB SCN CELLS X3 SERPL QL: NORMAL
BUN SERPL-MCNC: 8 MG/DL
CALCIUM SERPL-MCNC: 8.7 MG/DL
CHLORIDE SERPL-SCNC: 108 MMOL/L
CO2 SERPL-SCNC: 24 MMOL/L
CREAT SERPL-MCNC: 0.9 MG/DL
DIFFERENTIAL METHOD: ABNORMAL
EOSINOPHIL # BLD AUTO: 0 K/UL
EOSINOPHIL NFR BLD: 0 %
ERYTHROCYTE [DISTWIDTH] IN BLOOD BY AUTOMATED COUNT: 17 %
EST. GFR  (AFRICAN AMERICAN): >60 ML/MIN/1.73 M^2
EST. GFR  (NON AFRICAN AMERICAN): >60 ML/MIN/1.73 M^2
FERRITIN SERPL-MCNC: 32 NG/ML
GLUCOSE SERPL-MCNC: 126 MG/DL
HCT VFR BLD AUTO: 35.3 %
HGB BLD-MCNC: 11.7 G/DL
IMM GRANULOCYTES # BLD AUTO: 0.01 K/UL
IMM GRANULOCYTES NFR BLD AUTO: 0.2 %
LYMPHOCYTES # BLD AUTO: 2.2 K/UL
LYMPHOCYTES NFR BLD: 44.2 %
MCH RBC QN AUTO: 23.6 PG
MCHC RBC AUTO-ENTMCNC: 33.1 G/DL
MCV RBC AUTO: 71 FL
MONOCYTES # BLD AUTO: 0.4 K/UL
MONOCYTES NFR BLD: 8.4 %
NEUTROPHILS # BLD AUTO: 2.3 K/UL
NEUTROPHILS NFR BLD: 46.4 %
NRBC BLD-RTO: 0 /100 WBC
PLATELET # BLD AUTO: 288 K/UL
PMV BLD AUTO: 9.7 FL
POTASSIUM SERPL-SCNC: 4 MMOL/L
PROT SERPL-MCNC: 7.4 G/DL
RBC # BLD AUTO: 4.96 M/UL
RETICS/RBC NFR AUTO: 3.4 %
SODIUM SERPL-SCNC: 140 MMOL/L
WBC # BLD AUTO: 4.89 K/UL

## 2018-04-13 PROCEDURE — 99213 OFFICE O/P EST LOW 20 MIN: CPT | Mod: PBBFAC,25 | Performed by: INTERNAL MEDICINE

## 2018-04-13 PROCEDURE — 85025 COMPLETE CBC W/AUTO DIFF WBC: CPT

## 2018-04-13 PROCEDURE — 85045 AUTOMATED RETICULOCYTE COUNT: CPT

## 2018-04-13 PROCEDURE — 99999 PR PBB SHADOW E&M-EST. PATIENT-LVL III: CPT | Mod: PBBFAC,,, | Performed by: INTERNAL MEDICINE

## 2018-04-13 PROCEDURE — 99214 OFFICE O/P EST MOD 30 MIN: CPT | Mod: S$PBB,,, | Performed by: INTERNAL MEDICINE

## 2018-04-13 PROCEDURE — 82728 ASSAY OF FERRITIN: CPT

## 2018-04-13 PROCEDURE — 86850 RBC ANTIBODY SCREEN: CPT

## 2018-04-13 PROCEDURE — 80053 COMPREHEN METABOLIC PANEL: CPT

## 2018-04-13 RX ORDER — ERGOCALCIFEROL 1.25 MG/1
50000 CAPSULE ORAL
Qty: 12 CAPSULE | Refills: 2 | Status: SHIPPED | OUTPATIENT
Start: 2018-04-13 | End: 2019-06-13 | Stop reason: SDUPTHER

## 2018-04-13 NOTE — PROGRESS NOTES
SECTION OF HEMATOLOGY AND BONE MARROW TRANSPLANT  Return  Patient Visit   2018  Referred by:  No ref. provider found  Referred for: sickle beta thal     CHIEF COMPLAINT:   No chief complaint on file.      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  She established care with me in 2017.    She  moved around country ([place], [place], [place], now back in [place] permanently) and had hematologists taking care of her in each of these respective cities.  Her disease is notable for 1-3 VOC a year requiring ED presentation and admission.  She has episode of acute chest syndrome as young girl.  States she had subclinical stroke with no residual deficits.  Has never been on hydrea. Has history of HTN. Has 2 children.  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     Has only required rare transfusion over the course of her life.    Since our last appt, notes increase VOC  Pain crises frequency. Required admission.   Still has not established care with a PMD or gynecologist despite encouragement.       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 She has never used smokeless tobacco. She reports that she does not drink alcohol or use drugs.    FAMILY HISTORY:  Family History   Problem Relation Age of Onset    Breast cancer Neg Hx      Ovarian cancer Neg Hx     Colon cancer Neg Hx        CURRENT MEDICATIONS:   Current Outpatient Prescriptions   Medication Sig    albuterol (ACCUNEB) 0.63 mg/3 mL Nebu Take 0.63 mg by nebulization every 6 (six) hours as needed (for wheezing/shortness of breath). Rescue     albuterol (VENTOLIN HFA) 90 mcg/actuation inhaler Inhale 2 puffs into the lungs every 6 (six) hours as needed for Wheezing. Rescue    amlodipine (NORVASC) 10 MG tablet Take 1 tablet (10 mg total) by mouth once daily.    baclofen (LIORESAL) 10 MG tablet Take 1 tablet (10 mg total) by mouth 2 (two) times daily.    budesonide-formoterol 160-4.5 mcg (SYMBICORT) 160-4.5 mcg/actuation HFAA Inhale 2 puffs into the lungs every 12 (twelve) hours. Controller    carBAMazepine (TEGRETOL) 200 mg tablet Take 4 tablets (800 mg total) by mouth 2 (two) times daily.    ergocalciferol (VITAMIN D2) 50,000 unit Cap Take 1 capsule (50,000 Units total) by mouth every 7 days. (Patient taking differently: Take 50,000 Units by mouth every Monday. )    FLUoxetine (PROZAC) 40 MG capsule Take 1 capsule (40 mg total) by mouth once daily.    gabapentin (NEURONTIN) 800 MG tablet Take 1 tablet (800 mg total) by mouth 3 (three) times daily.    hydrochlorothiazide (MICROZIDE) 12.5 mg capsule Take 1 capsule (12.5 mg total) by mouth once daily.    hydroxyurea (HYDREA) 500 mg Cap Take 1 capsule (500 mg total) by mouth once daily.    hydrOXYzine HCl (ATARAX) 25 MG tablet     mirtazapine (REMERON SOL-TAB) 45 MG disintegrating tablet Take 1 tablet (45 mg total) by mouth every evening.    ondansetron (ZOFRAN-ODT) 8 MG TbDL Take 1 tablet (8 mg total) by mouth every 6 (six) hours as needed.    oxyCODONE-acetaminophen (PERCOCET)  mg per tablet Take 1 tablet by mouth every 6 (six) hours as needed for Pain.    senna-docusate 8.6-50 mg (PERICOLACE) 8.6-50 mg per tablet Take 1 tablet by mouth 2 (two) times daily.    clonazePAM (KLONOPIN) 2 MG Tab Take 1 tablet (2 mg  total) by mouth once daily.     No current facility-administered medications for this visit.      ALLERGIES:   Review of patient's allergies indicates:  No Known Allergies          REVIEW OF SYSTEMS:   General ROS: negative  Psychological ROS: negative  Ophthalmic ROS: negative  ENT ROS: negative  Allergy and Immunology ROS: negative  Hematological and Lymphatic ROS: negative  Endocrine ROS: negative  Respiratory ROS: negative  Cardiovascular ROS: negative  Gastrointestinal ROS: negative  Genito-Urinary ROS: negative  Musculoskeletal ROS: see HPI  Neurological ROS: negative  Dermatological ROS: negative    PHYSICAL EXAM:   Vitals:    04/13/18 1554   BP: (!) 198/121   Pulse: 88   Resp: 20   Temp: 98.8 °F (37.1 °C)       General - well developed, well nourished, no apparent distress  Head & Face - no sinus tenderness  Eyes - normal conjunctivae and lids   ENT - normal external auditory canals and tympanic membranes bilaterally oropharynx clear,  Normal dentition and gums  Neck - normal thyroid  Chest and Lung - normal respiratory effort, clear to auscultation bilaterally   Cardiovascular - RRR with no MGR, normal S1 and S2; no pedal edema  Abdomen -  soft, nontender, no palpable hepatomegaly or splenomegaly  Lymph - no palpable lymphadenopathy  Extremities - unremarkable nails and digits  Heme - no bruising, petechiae, pallor  Skin - no rashes or lesions  Psych - appropriate mood and affect      ECOG Performance Status: (foot note - ECOG PS provided by Eastern Cooperative Oncology Group) 1 - Symptomatic but completely ambulatory    Karnofsky Performance Score:  90%- Able to Carry on Normal Activity: Minor Symptoms of Disease  DATA:   Lab Results   Component Value Date    WBC 4.89 04/13/2018    HGB 11.7 (L) 04/13/2018    HCT 35.3 (L) 04/13/2018    MCV 71 (L) 04/13/2018     04/13/2018     Gran # (ANC)   Date Value Ref Range Status   04/13/2018 2.3 1.8 - 7.7 K/uL Final     Gran%   Date Value Ref Range Status    04/13/2018 46.4 38.0 - 73.0 % Final     Lymph #   Date Value Ref Range Status   04/13/2018 2.2 1.0 - 4.8 K/uL Final     Lymph%   Date Value Ref Range Status   04/13/2018 44.2 18.0 - 48.0 % Final     CMP  Sodium   Date Value Ref Range Status   04/13/2018 140 136 - 145 mmol/L Final     Potassium   Date Value Ref Range Status   04/13/2018 4.0 3.5 - 5.1 mmol/L Final     Chloride   Date Value Ref Range Status   04/13/2018 108 95 - 110 mmol/L Final     CO2   Date Value Ref Range Status   04/13/2018 24 23 - 29 mmol/L Final     Glucose   Date Value Ref Range Status   04/13/2018 126 (H) 70 - 110 mg/dL Final     BUN, Bld   Date Value Ref Range Status   04/13/2018 8 6 - 20 mg/dL Final     Creatinine   Date Value Ref Range Status   04/13/2018 0.9 0.5 - 1.4 mg/dL Final     Calcium   Date Value Ref Range Status   04/13/2018 8.7 8.7 - 10.5 mg/dL Final     Total Protein   Date Value Ref Range Status   04/13/2018 7.4 6.0 - 8.4 g/dL Final     Albumin   Date Value Ref Range Status   04/13/2018 3.5 3.5 - 5.2 g/dL Final     Total Bilirubin   Date Value Ref Range Status   04/13/2018 0.4 0.1 - 1.0 mg/dL Final     Comment:     For infants and newborns, interpretation of results should be based  on gestational age, weight and in agreement with clinical  observations.  Premature Infant recommended reference ranges:  Up to 24 hours.............<8.0 mg/dL  Up to 48 hours............<12.0 mg/dL  3-5 days..................<15.0 mg/dL  6-29 days.................<15.0 mg/dL       Alkaline Phosphatase   Date Value Ref Range Status   04/13/2018 102 55 - 135 U/L Final     AST   Date Value Ref Range Status   04/13/2018 25 10 - 40 U/L Final     ALT   Date Value Ref Range Status   04/13/2018 24 10 - 44 U/L Final     Anion Gap   Date Value Ref Range Status   04/13/2018 8 8 - 16 mmol/L Final     eGFR if    Date Value Ref Range Status   04/13/2018 >60.0 >60 mL/min/1.73 m^2 Final     eGFR if non    Date Value Ref Range  Status   04/13/2018 >60.0 >60 mL/min/1.73 m^2 Final     Comment:     Calculation used to obtain the estimated glomerular filtration  rate (eGFR) is the CKD-EPI equation.        Quant 2.2 - 3.2 % 5.9    Hemoglobin Bands   Hb A , Hb S , Hb F , Hb A2   Hemoglobin Electrophoresis Interp   See comment   Comments: There are prominent bands in the A and S positions,   measuring approximately 20 % and 66 % respectively with a hemoglobin   F band of approximately 8% and an elevated hemoglobin A2.     This pattern suggests sickle cell/beta + thalassemia, provided that   there is no history of recent RBC transfusion.  Await acid   electrophoresis   for confirmation of hemoglobin S.   Interpreted by Violette Sutherland M.D.          ASSESSMENT AND PLAN:   No diagnosis found.  -patient has sickle beta thallassemia   Sickle Cell Disease Monitoring   1)Hydrea  -previously 1-3 crises a year; per patient with increasing severity and frequency over last 2-3 years   -initiate hydrea 500mg BID (5/22/17) but she quickly self discontinued due to mild rash; she would like to restart due to pain crises which I encouraged (restart date 10/20)      2)Iron Overload  -she is actually iron deficient, likely contributing to fatigue/anemia  -received  IV feraheme x 2 march 2017 with return of her hgb back to baseline and normalized ferritin; hgb normal today    -referred to gyne but unclear why appt not made ; will rereffer     3)AVN  -has chronic bilateral hip pain   -will discuss obtaining MR if pain returns  4)LE Ulcerations   NA  5)HTN  -continue norvasc 10; BP elevated  Today   -start hctz 12.5 mg today (10/20) given elevated BP    6)Opthalmic  -needs opthalmology referral  -discuss referral at next appt     7)Pain  -continue home percocet 10 q 6 hrs   8)CardioPulmonary  -states  Had normal TTE by hematologist in dec 2016  -have requested report; next due dec 2018  -continue inhalers     9)Renal  -baseline UA and urine pr:Cr with mild  proteinura feb 2017; discuss adding ace at next appt   10)Neuro/CVA  -gives anecdotal history of subclinical stroke with no deficits  -will monitor  11)Vaccines   -receiving HIB series, menactra, prevnar followed by pneumovax      12)Contraception  -she has had tubal ligation     -I had long discussion with patient regarding compliance with consultant appts; also discussed that she needs to establish care with an internist to manage her other chronic medical conditions like HTN, asthma, health maintenance, cancer screening and that I would be managing her sickle cell disease   -start ca vit d, start hctz I prescribed enough to get her pmd medical appt   Follow Up:  -cbc, cmp, retic, ferritin, type and screen and MD appt in 3 months    Gregory Montgomery MD  Hematology/Oncology/Bone Marrow Transplant

## 2018-04-13 NOTE — PROGRESS NOTES
SECTION OF HEMATOLOGY AND BONE MARROW TRANSPLANT  Return  Patient Visit   2018  Referred by:  No ref. provider found  Referred for: sickle beta thal     CHIEF COMPLAINT:   No chief complaint on file.      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  She established care with me in 2017.    She  moved around country ([place], [place], [place], now back in [place] permanently) and had hematologists taking care of her in each of these respective cities.  Her disease is notable for 1-3 VOC a year requiring ED presentation and admission.  She has episode of acute chest syndrome as young girl.  States she had subclinical stroke with no residual deficits.  Has never been on hydrea. Has history of HTN. Has 2 children.  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     Has only required rare transfusion over the course of her life.    Since our last appt, notes stable VOC  Pain crises frequency.   Notes weight gain with remeron recently started by neurology for trigeminal neuralgia.          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 She has never used smokeless tobacco. She reports that she does not drink alcohol or use drugs.    FAMILY HISTORY:  Family History   Problem Relation Age of Onset    Breast cancer Neg Hx     Ovarian  cancer Neg Hx     Colon cancer Neg Hx        CURRENT MEDICATIONS:   Current Outpatient Prescriptions   Medication Sig    albuterol (ACCUNEB) 0.63 mg/3 mL Nebu Take 0.63 mg by nebulization every 6 (six) hours as needed (for wheezing/shortness of breath). Rescue     albuterol (VENTOLIN HFA) 90 mcg/actuation inhaler Inhale 2 puffs into the lungs every 6 (six) hours as needed for Wheezing. Rescue    amlodipine (NORVASC) 10 MG tablet Take 1 tablet (10 mg total) by mouth once daily.    baclofen (LIORESAL) 10 MG tablet Take 1 tablet (10 mg total) by mouth 2 (two) times daily.    budesonide-formoterol 160-4.5 mcg (SYMBICORT) 160-4.5 mcg/actuation HFAA Inhale 2 puffs into the lungs every 12 (twelve) hours. Controller    carBAMazepine (TEGRETOL) 200 mg tablet Take 4 tablets (800 mg total) by mouth 2 (two) times daily.    ergocalciferol (VITAMIN D2) 50,000 unit Cap Take 1 capsule (50,000 Units total) by mouth every 7 days. (Patient taking differently: Take 50,000 Units by mouth every Monday. )    FLUoxetine (PROZAC) 40 MG capsule Take 1 capsule (40 mg total) by mouth once daily.    gabapentin (NEURONTIN) 800 MG tablet Take 1 tablet (800 mg total) by mouth 3 (three) times daily.    hydrochlorothiazide (MICROZIDE) 12.5 mg capsule Take 1 capsule (12.5 mg total) by mouth once daily.    hydroxyurea (HYDREA) 500 mg Cap Take 1 capsule (500 mg total) by mouth once daily.    hydrOXYzine HCl (ATARAX) 25 MG tablet     mirtazapine (REMERON SOL-TAB) 45 MG disintegrating tablet Take 1 tablet (45 mg total) by mouth every evening.    ondansetron (ZOFRAN-ODT) 8 MG TbDL Take 1 tablet (8 mg total) by mouth every 6 (six) hours as needed.    oxyCODONE-acetaminophen (PERCOCET)  mg per tablet Take 1 tablet by mouth every 6 (six) hours as needed for Pain.    senna-docusate 8.6-50 mg (PERICOLACE) 8.6-50 mg per tablet Take 1 tablet by mouth 2 (two) times daily.    clonazePAM (KLONOPIN) 2 MG Tab Take 1 tablet (2 mg total) by  mouth once daily.    ergocalciferol (VITAMIN D2) 50,000 unit Cap Take 1 capsule (50,000 Units total) by mouth every 7 days.     No current facility-administered medications for this visit.      ALLERGIES:   Review of patient's allergies indicates:  No Known Allergies          REVIEW OF SYSTEMS:   General ROS: negative  Psychological ROS: negative  Ophthalmic ROS: negative  ENT ROS: negative  Allergy and Immunology ROS: negative  Hematological and Lymphatic ROS: negative  Endocrine ROS: negative  Respiratory ROS: negative  Cardiovascular ROS: negative  Gastrointestinal ROS: negative  Genito-Urinary ROS: negative  Musculoskeletal ROS: see HPI  Neurological ROS: negative  Dermatological ROS: negative    PHYSICAL EXAM:   Vitals:    04/13/18 1554   BP: (!) 198/121   Pulse: 88   Resp: 20   Temp: 98.8 °F (37.1 °C)       General - well developed, well nourished, no apparent distress  Head & Face - no sinus tenderness  Eyes - normal conjunctivae and lids   ENT - normal external auditory canals and tympanic membranes bilaterally oropharynx clear,  Normal dentition and gums  Neck - normal thyroid  Chest and Lung - normal respiratory effort, clear to auscultation bilaterally   Cardiovascular - RRR with no MGR, normal S1 and S2; no pedal edema  Abdomen -  soft, nontender, no palpable hepatomegaly or splenomegaly  Lymph - no palpable lymphadenopathy  Extremities - unremarkable nails and digits  Heme - no bruising, petechiae, pallor  Skin - no rashes or lesions  Psych - appropriate mood and affect      ECOG Performance Status: (foot note - ECOG PS provided by [employer]cology [employer]) 1 - Symptomatic but completely ambulatory    Karnofsky Performance Score:  90%- Able to Carry on Normal Activity: Minor Symptoms of Disease  DATA:   Lab Results   Component Value Date    WBC 4.89 04/13/2018    HGB 11.7 (L) 04/13/2018    HCT 35.3 (L) 04/13/2018    MCV 71 (L) 04/13/2018     04/13/2018     Gran # (ANC)   Date Value Ref  Range Status   04/13/2018 2.3 1.8 - 7.7 K/uL Final     Gran%   Date Value Ref Range Status   04/13/2018 46.4 38.0 - 73.0 % Final     Lymph #   Date Value Ref Range Status   04/13/2018 2.2 1.0 - 4.8 K/uL Final     Lymph%   Date Value Ref Range Status   04/13/2018 44.2 18.0 - 48.0 % Final     CMP  Sodium   Date Value Ref Range Status   04/13/2018 140 136 - 145 mmol/L Final     Potassium   Date Value Ref Range Status   04/13/2018 4.0 3.5 - 5.1 mmol/L Final     Chloride   Date Value Ref Range Status   04/13/2018 108 95 - 110 mmol/L Final     CO2   Date Value Ref Range Status   04/13/2018 24 23 - 29 mmol/L Final     Glucose   Date Value Ref Range Status   04/13/2018 126 (H) 70 - 110 mg/dL Final     BUN, Bld   Date Value Ref Range Status   04/13/2018 8 6 - 20 mg/dL Final     Creatinine   Date Value Ref Range Status   04/13/2018 0.9 0.5 - 1.4 mg/dL Final     Calcium   Date Value Ref Range Status   04/13/2018 8.7 8.7 - 10.5 mg/dL Final     Total Protein   Date Value Ref Range Status   04/13/2018 7.4 6.0 - 8.4 g/dL Final     Albumin   Date Value Ref Range Status   04/13/2018 3.5 3.5 - 5.2 g/dL Final     Total Bilirubin   Date Value Ref Range Status   04/13/2018 0.4 0.1 - 1.0 mg/dL Final     Comment:     For infants and newborns, interpretation of results should be based  on gestational age, weight and in agreement with clinical  observations.  Premature Infant recommended reference ranges:  Up to 24 hours.............<8.0 mg/dL  Up to 48 hours............<12.0 mg/dL  3-5 days..................<15.0 mg/dL  6-29 days.................<15.0 mg/dL       Alkaline Phosphatase   Date Value Ref Range Status   04/13/2018 102 55 - 135 U/L Final     AST   Date Value Ref Range Status   04/13/2018 25 10 - 40 U/L Final     ALT   Date Value Ref Range Status   04/13/2018 24 10 - 44 U/L Final     Anion Gap   Date Value Ref Range Status   04/13/2018 8 8 - 16 mmol/L Final     eGFR if    Date Value Ref Range Status   04/13/2018  >60.0 >60 mL/min/1.73 m^2 Final     eGFR if non    Date Value Ref Range Status   04/13/2018 >60.0 >60 mL/min/1.73 m^2 Final     Comment:     Calculation used to obtain the estimated glomerular filtration  rate (eGFR) is the CKD-EPI equation.        Quant 2.2 - 3.2 % 5.9    Hemoglobin Bands   Hb A , Hb S , Hb F , Hb A2   Hemoglobin Electrophoresis Interp   See comment   Comments: There are prominent bands in the A and S positions,   measuring approximately 20 % and 66 % respectively with a hemoglobin   F band of approximately 8% and an elevated hemoglobin A2.     This pattern suggests sickle cell/beta + thalassemia, provided that   there is no history of recent RBC transfusion.  Await acid   electrophoresis   for confirmation of hemoglobin S.   Interpreted by Violette Sutherland M.D.          ASSESSMENT AND PLAN:   Encounter Diagnoses   Name Primary?    Sickle cell beta thalassemia Yes    Vitamin D deficiency      -patient has sickle beta thallassemia   Sickle Cell Disease Monitoring   1)Hydrea  -previously 1-3 crises a year; per patient with increasing severity and frequency over last 2-3 years   -initiate hydrea 500mg BID (5/22/17) but she quickly self discontinued due to mild rash; she refuses to restart  -discussed replacing hydrea with l-glutamine today; provided pt information on medications; states she would like to read about it and will get back to me re: starting      2)Iron Overload  -history of  iron deficiency from menorrhagnia, likely contributing to fatigue/anemia  -received  IV feraheme x 2 march 2017 with return of her hgb back to baseline and normalized ferritin; hgb and ferritin at goal today    -fu with gyne     3)AVN  -has chronic bilateral hip pain   -will discuss obtaining MR if pain returns  4)LE Ulcerations   NA  5)HTN  -continue norvasc 10; BP elevated  Today   -continue hctz 12.5 mg  (10/20/2017) given elevated BP; did not take today     6)Opthalmic  -needs opthalmology  referral  -encouraged her to make appt with opthalmology     7)Pain  -continue home percocet 10 q 6 hrs   8)CardioPulmonary  -states  Had normal TTE by hematologist in dec 2016  -have requested report; next due dec 2018  -continue inhalers     9)Renal  - feb 2018 UA with no proteinuria; recheck feb 2019  10)Neuro/CVA  -gives anecdotal history of subclinical stroke with no deficits  -will monitor  11)Vaccines   -receiving HIB series, menactra, prevnar followed by pneumovax      12)Contraception  -she has had tubal ligation     -I had long discussion with patient regarding compliance with consultant appts; also discussed that she needs to establish care with an internist to manage her other chronic medical conditions like HTN, asthma, health maintenance, cancer screening and that I would be managing her sickle cell disease      Follow Up:  -cbc, cmp, retic, ferritin, type and screen and MD appt in 3 months    Gregory Montgomery MD  Hematology/Oncology/Bone Marrow Transplant

## 2018-04-13 NOTE — PROGRESS NOTES
Weight gain, edema remeron -talk to june reynoso  1 ER visit sicne last appt; pain at home  dicsussed l glutamine given pt information to read

## 2018-04-20 ENCOUNTER — PATIENT MESSAGE (OUTPATIENT)
Dept: HEMATOLOGY/ONCOLOGY | Facility: CLINIC | Age: 38
End: 2018-04-20

## 2018-04-20 DIAGNOSIS — D57.40 SICKLE CELL BETA THALASSEMIA: ICD-10-CM

## 2018-04-20 RX ORDER — OXYCODONE AND ACETAMINOPHEN 10; 325 MG/1; MG/1
1 TABLET ORAL EVERY 6 HOURS PRN
Qty: 120 TABLET | Refills: 0 | Status: SHIPPED | OUTPATIENT
Start: 2018-04-20 | End: 2018-05-21 | Stop reason: SDUPTHER

## 2018-05-04 PROCEDURE — 99284 EMERGENCY DEPT VISIT MOD MDM: CPT | Mod: 25

## 2018-05-04 PROCEDURE — 99284 EMERGENCY DEPT VISIT MOD MDM: CPT | Mod: ,,, | Performed by: EMERGENCY MEDICINE

## 2018-05-04 PROCEDURE — 93005 ELECTROCARDIOGRAM TRACING: CPT

## 2018-05-05 ENCOUNTER — HOSPITAL ENCOUNTER (EMERGENCY)
Facility: HOSPITAL | Age: 38
Discharge: HOME | End: 2018-05-05
Attending: EMERGENCY MEDICINE
Payer: MEDICAID

## 2018-05-05 VITALS
TEMPERATURE: 99 F | BODY MASS INDEX: 53.92 KG/M2 | RESPIRATION RATE: 22 BRPM | WEIGHT: 293 LBS | SYSTOLIC BLOOD PRESSURE: 160 MMHG | OXYGEN SATURATION: 96 % | DIASTOLIC BLOOD PRESSURE: 85 MMHG | HEIGHT: 62 IN | HEART RATE: 96 BPM

## 2018-05-05 DIAGNOSIS — J06.9 VIRAL URI: Primary | ICD-10-CM

## 2018-05-05 DIAGNOSIS — J45.40 MODERATE PERSISTENT ASTHMA WITHOUT COMPLICATION: ICD-10-CM

## 2018-05-05 DIAGNOSIS — J45.901 EXACERBATION OF ASTHMA, UNSPECIFIED ASTHMA SEVERITY, UNSPECIFIED WHETHER PERSISTENT: ICD-10-CM

## 2018-05-05 DIAGNOSIS — R06.02 SOB (SHORTNESS OF BREATH): ICD-10-CM

## 2018-05-05 PROCEDURE — 94640 AIRWAY INHALATION TREATMENT: CPT

## 2018-05-05 PROCEDURE — 63600175 PHARM REV CODE 636 W HCPCS: Performed by: EMERGENCY MEDICINE

## 2018-05-05 PROCEDURE — 25000242 PHARM REV CODE 250 ALT 637 W/ HCPCS: Performed by: NURSE PRACTITIONER

## 2018-05-05 PROCEDURE — 93010 ELECTROCARDIOGRAM REPORT: CPT | Mod: ,,, | Performed by: INTERNAL MEDICINE

## 2018-05-05 PROCEDURE — 94761 N-INVAS EAR/PLS OXIMETRY MLT: CPT

## 2018-05-05 RX ORDER — IPRATROPIUM BROMIDE AND ALBUTEROL SULFATE 2.5; .5 MG/3ML; MG/3ML
3 SOLUTION RESPIRATORY (INHALATION)
Status: COMPLETED | OUTPATIENT
Start: 2018-05-05 | End: 2018-05-05

## 2018-05-05 RX ORDER — PREDNISONE 20 MG/1
60 TABLET ORAL
Status: COMPLETED | OUTPATIENT
Start: 2018-05-05 | End: 2018-05-05

## 2018-05-05 RX ORDER — ALBUTEROL SULFATE 90 UG/1
2 AEROSOL, METERED RESPIRATORY (INHALATION) EVERY 6 HOURS PRN
Qty: 18 G | Refills: 2 | Status: SHIPPED | OUTPATIENT
Start: 2018-05-05 | End: 2018-12-06 | Stop reason: SDUPTHER

## 2018-05-05 RX ORDER — ALBUTEROL SULFATE 0.63 MG/3ML
0.63 SOLUTION RESPIRATORY (INHALATION) EVERY 6 HOURS PRN
Qty: 1 BOX | Refills: 0 | Status: ON HOLD | OUTPATIENT
Start: 2018-05-05 | End: 2019-07-31

## 2018-05-05 RX ORDER — BENZONATATE 100 MG/1
100 CAPSULE ORAL 3 TIMES DAILY PRN
Qty: 20 CAPSULE | Refills: 0 | Status: SHIPPED | OUTPATIENT
Start: 2018-05-05 | End: 2018-05-15

## 2018-05-05 RX ORDER — PREDNISONE 20 MG/1
40 TABLET ORAL DAILY
Qty: 8 TABLET | Refills: 0 | Status: SHIPPED | OUTPATIENT
Start: 2018-05-05 | End: 2018-05-09

## 2018-05-05 RX ADMIN — IPRATROPIUM BROMIDE AND ALBUTEROL SULFATE 3 ML: .5; 3 SOLUTION RESPIRATORY (INHALATION) at 01:05

## 2018-05-05 RX ADMIN — IPRATROPIUM BROMIDE AND ALBUTEROL SULFATE 3 ML: .5; 3 SOLUTION RESPIRATORY (INHALATION) at 12:05

## 2018-05-05 RX ADMIN — PREDNISONE 60 MG: 20 TABLET ORAL at 12:05

## 2018-05-05 NOTE — DISCHARGE INSTRUCTIONS
Pt discharge home under self care. Pt was given written discharge instructions and told to f/u with PCP. Pt was given Rx and instruct to have them filled asap. Pt verbalized understanding.

## 2018-05-05 NOTE — ED PROVIDER NOTES
"Encounter Date: 2018       History     Chief Complaint   Patient presents with    Shortness of Breath     Pt reports wheezing, nonproductive cough, and SOB that began today. Pt reports she used her inhaler 12 times and breathing tx with minimal relief of symptoms.      The history is provided by the patient.   Shortness of Breath   This is a new problem. The problem occurs rarely.The current episode started 12 to 24 hours ago. The problem has been gradually worsening. Associated symptoms include wheezing and leg swelling (over the past week, "like it usually does around the time of my period"). Pertinent negatives include no fever, no headaches, no rhinorrhea, no sore throat, no cough, no sputum production, no chest pain, no vomiting, no abdominal pain and no rash. She has tried beta-agonist inhalers for the symptoms. The treatment provided no relief. Associated medical issues include asthma.     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    Asthma     Depression     Hypertension     Morbid obesity     Opioid dependence 2017    Pneumonia due to Streptococcus pneumoniae 2017    Sepsis due to Streptococcus pneumoniae 2017    Sickle cell-beta thalassemia disease with pain     Trigeminal neuralgia      Past Surgical History:   Procedure Laterality Date     SECTION      TONSILLECTOMY      TUBAL LIGATION       Family History   Problem Relation Age of Onset    Breast cancer Neg Hx     Ovarian cancer Neg Hx     Colon cancer Neg Hx      Social History   Substance Use Topics    Smoking status: Never Smoker    Smokeless tobacco: Never Used    Alcohol use No     Review of Systems   Constitutional: Negative for chills and fever.   HENT: Negative for facial swelling, rhinorrhea and sore throat.    Eyes: Negative " "for visual disturbance.   Respiratory: Positive for shortness of breath and wheezing. Negative for cough and sputum production.    Cardiovascular: Positive for leg swelling (over the past week, "like it usually does around the time of my period"). Negative for chest pain.   Gastrointestinal: Negative for abdominal pain, nausea and vomiting.   Genitourinary: Negative for dysuria.   Musculoskeletal: Negative for back pain and gait problem.   Skin: Negative for rash.   Neurological: Negative for dizziness, weakness and headaches.   Hematological: Does not bruise/bleed easily.       Physical Exam     Initial Vitals [05/04/18 2347]   BP Pulse Resp Temp SpO2   (!) 203/115 (!) 116 (!) 24 98.7 °F (37.1 °C) (!) 93 %      MAP       144.33         Physical Exam    Nursing note and vitals reviewed.  Constitutional: She appears well-developed and well-nourished. She is Obese .  Non-toxic appearance.   HENT:   Head: Normocephalic and atraumatic.   Right Ear: Hearing and external ear normal.   Left Ear: Hearing and external ear normal.   Nose: Nose normal.   Mouth/Throat: Mucous membranes are normal.   Eyes: Conjunctivae and EOM are normal. Pupils are equal, round, and reactive to light.   Neck: Full passive range of motion without pain. Neck supple. No neck rigidity.   Cardiovascular: Normal rate and normal pulses.   Pulses:       Radial pulses are 2+ on the right side, and 2+ on the left side.        Dorsalis pedis pulses are 2+ on the right side, and 2+ on the left side.   Pulmonary/Chest: Effort normal. No respiratory distress. She has no decreased breath sounds. She has wheezes (bilateral, inspiratory and expiratory). She has no rhonchi.   Abdominal: Soft. Bowel sounds are normal. She exhibits no distension. There is no tenderness. There is no rigidity, no rebound and no guarding.   Musculoskeletal: Normal range of motion.   Neurological: She is alert and oriented to person, place, and time. She has normal strength. Gait " normal. GCS eye subscore is 4. GCS verbal subscore is 5. GCS motor subscore is 6.   No focal neurologic deficits   Skin: Skin is warm, dry and intact. Capillary refill takes less than 2 seconds. No rash noted.   Psychiatric: She has a normal mood and affect. Her speech is normal and behavior is normal. Judgment and thought content normal. Cognition and memory are normal.         ED Course   Procedures  Labs Reviewed - No data to display          Medical Decision Making:   History:   Old Medical Records: I decided to obtain old medical records.  Differential Diagnosis:   Anaphylaxis  Angioedema  Asthma Exacerbation  ACS  Acute Chest Syndrome  Pneumonia  Pneumothorax  Pulmonary Embolism  Clinical Tests:   Radiological Study: Ordered and Reviewed  Medical Tests: Ordered and Reviewed  ED Management:  The patient is a 38-year-old female with a past medical history of asthma, acute chest syndrome, hypertension, morbid obesity, opioid dependence, sepsis, and sickle cell disease who presents to the ED complaining of shortness of breath that began earlier today and has been gradually worsening.  The patient is afebrile, non-toxic appearing, and hemodynamically stable.  Hypertension noted upon presentation, however the patient denies chest pain, visual disturbance, and decrease in urine production.  Blood pressure normalized on its own without intervention.  Patient states that she feels this is similar to her past asthma exacerbations.  She reports compliance with her prescribed medications.  Denies sick contacts.  Reports treating with her rescue inhaler 10+ times without relief of symptoms prior to arrival.    Physical exam is notable for wheezing in bilateral lung fields on auscultation. Exam is otherwise benign.    ECG does not indicate acute ischemia.    Symptoms improved following nebulizer treatments and prednisone administration in the ED.    Mildly increased perihilar and peribronchial interstitial markings  demonstrated per radiologic evaluation which can be seen with viral respiratory illness or reactive airway disease.    Based on history and physical exam, as well as diagnostic results, considered but do not suspect anaphylaxis, angioedema, ACS, acute chest syndrome, pneumonia, pneumothorax, or pulmonary embolism.  Clinical presentation suggests asthma exacerbation due to viral URI.  Patient will be discharged with prescription for prednisone and refills on her albuterol and instructed to follow up with her primary care provider.  Return precautions given.  Case discussed with supervising physician who agrees with plan.    Additional MDM:   PERC Rule:   Age is greater than or equal to 50 = 0.0  Heart Rate is greater than or equal to 100 = 0.0  SaO2 on room air < 95% = 0.0  Unilateral leg swelling = 0.0  Hemoptysis = 0.0  Recent surgery or trauma = 0.0  Prior PE or DVT =  0.0  Hormone use = 0.00  PERC Score = 0           Attending Attestation:     Physician Attestation Statement for NP/PA:   I have conducted a face to face encounter with this patient in addition to the NP/PA, due to Medical Complexity    Other NP/PA Attestation Additions:      Medical Decision Making: SOB, h/o asthma.  Resolved with breathing treatments.  No sickle cell issues acutely.  OK to d/c.                     Clinical Impression:   The primary encounter diagnosis was Viral URI. Diagnoses of SOB (shortness of breath), Exacerbation of asthma, unspecified asthma severity, unspecified whether persistent, and Moderate persistent asthma without complication were also pertinent to this visit.                           Arabella Luis NP  05/05/18 0150       Dustin Hennessy MD  05/06/18 3699

## 2018-05-21 DIAGNOSIS — D57.40 SICKLE CELL BETA THALASSEMIA: ICD-10-CM

## 2018-05-21 RX ORDER — OXYCODONE AND ACETAMINOPHEN 10; 325 MG/1; MG/1
1 TABLET ORAL EVERY 6 HOURS PRN
Qty: 120 TABLET | Refills: 0 | Status: SHIPPED | OUTPATIENT
Start: 2018-05-21 | End: 2018-06-25 | Stop reason: SDUPTHER

## 2018-05-21 NOTE — TELEPHONE ENCOUNTER
----- Message from Jenna Briggs sent at 5/21/2018 11:31 AM CDT -----  Contact: pt  Pt called to speak with nurse about going to the Pain Clinic today and 2nd time calling   Callback#[phone]  Thank You  TELMA Briggs

## 2018-05-21 NOTE — TELEPHONE ENCOUNTER
Informed Ms. Malone that I spoke with Daniela (Pt Access Rep in Pain Management) regarding scheduling pt for an appt in Pain Management. Daniela stated the Pain Management Clinic is currently not accepting anymore Medicaid pt's because all the Medicaid Slots are filled up until the end of the year. Ms. Malone voiced understanding. Pt's information was given to Daniela to give to the nurse in Pain Management Clinic to see if there are any cancellations by which she can add pt to the schedule. If so, nurse will give Ms. Malone a call and add her to the schedule. Ms. Malone voiced understanding. Also informed pt that there is a Medicaid Access line which includes a list of providers that help with Pain Management and take Medicaid pts. I offered pt the number to Medicaid Access Line, pt denied the number and stated she's just going to ask Dr. Montgomery for a pain prescription refill. I voiced understanding.

## 2018-05-21 NOTE — TELEPHONE ENCOUNTER
Informed pt her Percocet prescription refill was approved and called into her pharmacy. Pt voiced understanding.

## 2018-05-21 NOTE — TELEPHONE ENCOUNTER
----- Message from Phyllis Neri sent at 5/21/2018 11:24 AM CDT -----  Contact: patient  Patient needs the nurse to try to get her in today to the pain management clinic, patient said this is how she always schedules with them is through Oncology.  Please call patient at [phone]

## 2018-06-11 DIAGNOSIS — J45.40 MODERATE PERSISTENT ASTHMA WITHOUT COMPLICATION: ICD-10-CM

## 2018-06-11 RX ORDER — BUDESONIDE AND FORMOTEROL FUMARATE DIHYDRATE 160; 4.5 UG/1; UG/1
2 AEROSOL RESPIRATORY (INHALATION) EVERY 12 HOURS
Qty: 10.2 INHALER | Refills: 0 | Status: SHIPPED | OUTPATIENT
Start: 2018-06-11 | End: 2018-07-16 | Stop reason: SDUPTHER

## 2018-06-20 ENCOUNTER — LAB VISIT (OUTPATIENT)
Dept: LAB | Facility: HOSPITAL | Age: 38
End: 2018-06-20
Attending: INTERNAL MEDICINE
Payer: MEDICAID

## 2018-06-20 DIAGNOSIS — D57.1 HB-SS DISEASE WITHOUT CRISIS: ICD-10-CM

## 2018-06-20 DIAGNOSIS — D57.40 SICKLE CELL BETA THALASSEMIA: ICD-10-CM

## 2018-06-20 LAB
ABO + RH BLD: NORMAL
ALBUMIN SERPL BCP-MCNC: 3.6 G/DL
ALP SERPL-CCNC: 113 U/L
ALT SERPL W/O P-5'-P-CCNC: 13 U/L
ANION GAP SERPL CALC-SCNC: 10 MMOL/L
AST SERPL-CCNC: 16 U/L
BASOPHILS # BLD AUTO: 0.02 K/UL
BASOPHILS NFR BLD: 0.4 %
BILIRUB SERPL-MCNC: 0.4 MG/DL
BLD GP AB SCN CELLS X3 SERPL QL: NORMAL
BUN SERPL-MCNC: 7 MG/DL
CALCIUM SERPL-MCNC: 8.6 MG/DL
CHLORIDE SERPL-SCNC: 102 MMOL/L
CO2 SERPL-SCNC: 28 MMOL/L
CREAT SERPL-MCNC: 0.9 MG/DL
DIFFERENTIAL METHOD: ABNORMAL
EOSINOPHIL # BLD AUTO: 0 K/UL
EOSINOPHIL NFR BLD: 0 %
ERYTHROCYTE [DISTWIDTH] IN BLOOD BY AUTOMATED COUNT: 15.9 %
EST. GFR  (AFRICAN AMERICAN): >60 ML/MIN/1.73 M^2
EST. GFR  (NON AFRICAN AMERICAN): >60 ML/MIN/1.73 M^2
FERRITIN SERPL-MCNC: 13 NG/ML
GLUCOSE SERPL-MCNC: 169 MG/DL
HCT VFR BLD AUTO: 35 %
HGB BLD-MCNC: 11.7 G/DL
IMM GRANULOCYTES # BLD AUTO: 0.01 K/UL
IMM GRANULOCYTES NFR BLD AUTO: 0.2 %
LYMPHOCYTES # BLD AUTO: 2.6 K/UL
LYMPHOCYTES NFR BLD: 49.7 %
MCH RBC QN AUTO: 22.7 PG
MCHC RBC AUTO-ENTMCNC: 33.4 G/DL
MCV RBC AUTO: 68 FL
MONOCYTES # BLD AUTO: 0.4 K/UL
MONOCYTES NFR BLD: 6.8 %
NEUTROPHILS # BLD AUTO: 2.3 K/UL
NEUTROPHILS NFR BLD: 42.9 %
NRBC BLD-RTO: 0 /100 WBC
PLATELET # BLD AUTO: 261 K/UL
PMV BLD AUTO: 9.9 FL
POTASSIUM SERPL-SCNC: 3.5 MMOL/L
PROT SERPL-MCNC: 7.7 G/DL
RBC # BLD AUTO: 5.16 M/UL
RETICS/RBC NFR AUTO: 2 %
SODIUM SERPL-SCNC: 140 MMOL/L
WBC # BLD AUTO: 5.31 K/UL

## 2018-06-20 PROCEDURE — 80053 COMPREHEN METABOLIC PANEL: CPT

## 2018-06-20 PROCEDURE — 86850 RBC ANTIBODY SCREEN: CPT

## 2018-06-20 PROCEDURE — 82728 ASSAY OF FERRITIN: CPT

## 2018-06-20 PROCEDURE — 85045 AUTOMATED RETICULOCYTE COUNT: CPT

## 2018-06-20 PROCEDURE — 85025 COMPLETE CBC W/AUTO DIFF WBC: CPT

## 2018-06-25 DIAGNOSIS — D57.40 SICKLE CELL BETA THALASSEMIA: ICD-10-CM

## 2018-06-25 DIAGNOSIS — I10 ESSENTIAL HYPERTENSION: ICD-10-CM

## 2018-06-25 RX ORDER — AMLODIPINE BESYLATE 10 MG/1
10 TABLET ORAL DAILY
Qty: 30 TABLET | Refills: 3 | Status: SHIPPED | OUTPATIENT
Start: 2018-06-25 | End: 2019-02-26 | Stop reason: SDUPTHER

## 2018-06-25 RX ORDER — OXYCODONE AND ACETAMINOPHEN 10; 325 MG/1; MG/1
1 TABLET ORAL EVERY 6 HOURS PRN
Qty: 120 TABLET | Refills: 0 | Status: SHIPPED | OUTPATIENT
Start: 2018-06-25 | End: 2018-07-24 | Stop reason: SDUPTHER

## 2018-07-10 RX ORDER — HEPARIN 100 UNIT/ML
500 SYRINGE INTRAVENOUS
Status: CANCELLED | OUTPATIENT
Start: 2018-07-24

## 2018-07-10 RX ORDER — SODIUM CHLORIDE 0.9 % (FLUSH) 0.9 %
10 SYRINGE (ML) INJECTION
Status: CANCELLED | OUTPATIENT
Start: 2018-07-24

## 2018-07-10 RX ORDER — SODIUM CHLORIDE 0.9 % (FLUSH) 0.9 %
10 SYRINGE (ML) INJECTION
Status: CANCELLED | OUTPATIENT
Start: 2018-07-17

## 2018-07-10 RX ORDER — HEPARIN 100 UNIT/ML
500 SYRINGE INTRAVENOUS
Status: CANCELLED | OUTPATIENT
Start: 2018-07-17

## 2018-07-14 ENCOUNTER — PATIENT MESSAGE (OUTPATIENT)
Dept: NEUROLOGY | Facility: CLINIC | Age: 38
End: 2018-07-14

## 2018-07-15 DIAGNOSIS — G50.0 TRIGEMINAL NEURALGIA: ICD-10-CM

## 2018-07-15 RX ORDER — CARBAMAZEPINE 200 MG/1
800 TABLET ORAL 2 TIMES DAILY
Qty: 240 TABLET | Refills: 5 | Status: SHIPPED | OUTPATIENT
Start: 2018-07-15 | End: 2019-03-15 | Stop reason: SDUPTHER

## 2018-07-16 ENCOUNTER — INFUSION (OUTPATIENT)
Dept: INFUSION THERAPY | Facility: HOSPITAL | Age: 38
End: 2018-07-16
Attending: INTERNAL MEDICINE
Payer: MEDICAID

## 2018-07-16 VITALS
HEART RATE: 73 BPM | RESPIRATION RATE: 18 BRPM | TEMPERATURE: 99 F | SYSTOLIC BLOOD PRESSURE: 145 MMHG | DIASTOLIC BLOOD PRESSURE: 76 MMHG

## 2018-07-16 DIAGNOSIS — J45.40 MODERATE PERSISTENT ASTHMA WITHOUT COMPLICATION: ICD-10-CM

## 2018-07-16 DIAGNOSIS — D50.9 IRON DEFICIENCY ANEMIA, UNSPECIFIED IRON DEFICIENCY ANEMIA TYPE: ICD-10-CM

## 2018-07-16 DIAGNOSIS — D57.00 SICKLE-CELL DISEASE WITH PAIN: Primary | ICD-10-CM

## 2018-07-16 PROCEDURE — 96365 THER/PROPH/DIAG IV INF INIT: CPT

## 2018-07-16 PROCEDURE — 63600175 PHARM REV CODE 636 W HCPCS: Mod: JG | Performed by: INTERNAL MEDICINE

## 2018-07-16 PROCEDURE — 25000003 PHARM REV CODE 250: Performed by: INTERNAL MEDICINE

## 2018-07-16 RX ORDER — HEPARIN 100 UNIT/ML
500 SYRINGE INTRAVENOUS
Status: DISCONTINUED | OUTPATIENT
Start: 2018-07-16 | End: 2018-07-16 | Stop reason: HOSPADM

## 2018-07-16 RX ORDER — BUDESONIDE AND FORMOTEROL FUMARATE DIHYDRATE 160; 4.5 UG/1; UG/1
2 AEROSOL RESPIRATORY (INHALATION) EVERY 12 HOURS
Qty: 10.2 INHALER | Refills: 11 | Status: SHIPPED | OUTPATIENT
Start: 2018-07-16 | End: 2019-04-25

## 2018-07-16 RX ORDER — SODIUM CHLORIDE 0.9 % (FLUSH) 0.9 %
10 SYRINGE (ML) INJECTION
Status: DISCONTINUED | OUTPATIENT
Start: 2018-07-16 | End: 2018-07-16 | Stop reason: HOSPADM

## 2018-07-16 RX ADMIN — FERUMOXYTOL 510 MG: 510 INJECTION INTRAVENOUS at 04:07

## 2018-07-16 NOTE — PLAN OF CARE
Problem: Patient Care Overview  Goal: Plan of Care Review  Outcome: Ongoing (interventions implemented as appropriate)  Pt tolerated first dose Fereheme without complication. Pt monitored post infusion. Pt educated on side effects of medication and instructed when to seek emergency care; verbalized understanding. PIV removed. VSS; NAD. Discharging unassisted.

## 2018-07-19 DIAGNOSIS — D57.00 HB-SS DISEASE WITH CRISIS: Primary | ICD-10-CM

## 2018-07-20 ENCOUNTER — LAB VISIT (OUTPATIENT)
Dept: LAB | Facility: HOSPITAL | Age: 38
End: 2018-07-20
Attending: INTERNAL MEDICINE
Payer: MEDICAID

## 2018-07-20 DIAGNOSIS — D57.00 HB-SS DISEASE WITH CRISIS: ICD-10-CM

## 2018-07-20 DIAGNOSIS — D57.40 SICKLE CELL BETA THALASSEMIA: ICD-10-CM

## 2018-07-20 LAB
ABO + RH BLD: NORMAL
ALBUMIN SERPL BCP-MCNC: 3.4 G/DL
ALP SERPL-CCNC: 105 U/L
ALT SERPL W/O P-5'-P-CCNC: 26 U/L
ANION GAP SERPL CALC-SCNC: 8 MMOL/L
AST SERPL-CCNC: 32 U/L
BASOPHILS # BLD AUTO: 0.02 K/UL
BASOPHILS NFR BLD: 0.4 %
BILIRUB SERPL-MCNC: 0.4 MG/DL
BLD GP AB SCN CELLS X3 SERPL QL: NORMAL
BUN SERPL-MCNC: 13 MG/DL
CALCIUM SERPL-MCNC: 9 MG/DL
CHLORIDE SERPL-SCNC: 104 MMOL/L
CO2 SERPL-SCNC: 27 MMOL/L
CREAT SERPL-MCNC: 0.8 MG/DL
DIFFERENTIAL METHOD: ABNORMAL
EOSINOPHIL # BLD AUTO: 0 K/UL
EOSINOPHIL NFR BLD: 0 %
ERYTHROCYTE [DISTWIDTH] IN BLOOD BY AUTOMATED COUNT: 17.4 %
EST. GFR  (AFRICAN AMERICAN): >60 ML/MIN/1.73 M^2
EST. GFR  (NON AFRICAN AMERICAN): >60 ML/MIN/1.73 M^2
FERRITIN SERPL-MCNC: 750 NG/ML
GLUCOSE SERPL-MCNC: 172 MG/DL
HCT VFR BLD AUTO: 34.5 %
HGB BLD-MCNC: 11.2 G/DL
IMM GRANULOCYTES # BLD AUTO: 0.03 K/UL
IMM GRANULOCYTES NFR BLD AUTO: 0.5 %
LYMPHOCYTES # BLD AUTO: 2 K/UL
LYMPHOCYTES NFR BLD: 36.5 %
MCH RBC QN AUTO: 21.7 PG
MCHC RBC AUTO-ENTMCNC: 32.5 G/DL
MCV RBC AUTO: 67 FL
MONOCYTES # BLD AUTO: 0.5 K/UL
MONOCYTES NFR BLD: 8.9 %
NEUTROPHILS # BLD AUTO: 2.9 K/UL
NEUTROPHILS NFR BLD: 53.7 %
NRBC BLD-RTO: 1 /100 WBC
PLATELET # BLD AUTO: 217 K/UL
PMV BLD AUTO: 10 FL
POTASSIUM SERPL-SCNC: 3.7 MMOL/L
PROT SERPL-MCNC: 7.3 G/DL
RBC # BLD AUTO: 5.15 M/UL
SODIUM SERPL-SCNC: 139 MMOL/L
WBC # BLD AUTO: 5.48 K/UL

## 2018-07-20 PROCEDURE — 82728 ASSAY OF FERRITIN: CPT

## 2018-07-20 PROCEDURE — 80053 COMPREHEN METABOLIC PANEL: CPT

## 2018-07-20 PROCEDURE — 85025 COMPLETE CBC W/AUTO DIFF WBC: CPT

## 2018-07-20 PROCEDURE — 86901 BLOOD TYPING SEROLOGIC RH(D): CPT

## 2018-07-20 PROCEDURE — 36415 COLL VENOUS BLD VENIPUNCTURE: CPT

## 2018-07-20 RX ORDER — OXYCODONE AND ACETAMINOPHEN 10; 325 MG/1; MG/1
1 TABLET ORAL EVERY 6 HOURS PRN
Qty: 120 TABLET | Refills: 0 | Status: CANCELLED | OUTPATIENT
Start: 2018-07-20

## 2018-07-22 ENCOUNTER — PATIENT MESSAGE (OUTPATIENT)
Dept: HEMATOLOGY/ONCOLOGY | Facility: CLINIC | Age: 38
End: 2018-07-22

## 2018-07-23 ENCOUNTER — TELEPHONE (OUTPATIENT)
Dept: INFUSION THERAPY | Facility: HOSPITAL | Age: 38
End: 2018-07-23

## 2018-07-23 ENCOUNTER — TELEPHONE (OUTPATIENT)
Dept: HEMATOLOGY/ONCOLOGY | Facility: CLINIC | Age: 38
End: 2018-07-23

## 2018-07-23 NOTE — TELEPHONE ENCOUNTER
----- Message from Jenna Briggs sent at 7/23/2018  3:10 PM CDT -----  Contact: pt   Pt called to speak with nurse have some question   Callback#[phone]  Thank You  TELMA Briggs

## 2018-07-23 NOTE — TELEPHONE ENCOUNTER
----- Message from Jenna Briggs sent at 7/23/2018 10:45 AM CDT -----  Contact: pt  Pt called to speak with nurse about blood work   Callback#[phone]  Thank You  TELMA Briggs

## 2018-07-23 NOTE — TELEPHONE ENCOUNTER
Spoke with patient informed her that we are awaiting for Dr. Montgomery to review patient's labs and her reaction to her last iron infusion and awaiting to see the next steps for the patient. The patient will await for Dr. Montgomery to review and answer.    --Belia Mohan

## 2018-07-24 ENCOUNTER — PATIENT MESSAGE (OUTPATIENT)
Dept: HEMATOLOGY/ONCOLOGY | Facility: CLINIC | Age: 38
End: 2018-07-24

## 2018-07-24 DIAGNOSIS — D57.40 SICKLE CELL BETA THALASSEMIA: ICD-10-CM

## 2018-07-24 DIAGNOSIS — M62.838 MUSCLE SPASM: Primary | ICD-10-CM

## 2018-07-24 RX ORDER — OXYCODONE AND ACETAMINOPHEN 10; 325 MG/1; MG/1
1 TABLET ORAL EVERY 4 HOURS PRN
Qty: 120 TABLET | Refills: 0 | Status: SHIPPED | OUTPATIENT
Start: 2018-07-24 | End: 2018-08-20 | Stop reason: SDUPTHER

## 2018-07-24 RX ORDER — OXYCODONE AND ACETAMINOPHEN 10; 325 MG/1; MG/1
1 TABLET ORAL EVERY 6 HOURS PRN
Qty: 120 TABLET | Refills: 0 | Status: SHIPPED | OUTPATIENT
Start: 2018-07-24 | End: 2018-07-24 | Stop reason: SDUPTHER

## 2018-07-24 RX ORDER — CYCLOBENZAPRINE HCL 10 MG
10 TABLET ORAL 3 TIMES DAILY PRN
Start: 2018-07-24 | End: 2018-08-03

## 2018-07-24 RX ORDER — CYCLOBENZAPRINE HCL 10 MG
10 TABLET ORAL 3 TIMES DAILY
Qty: 90 TABLET | Refills: 11 | Status: SHIPPED | OUTPATIENT
Start: 2018-07-24 | End: 2019-02-18 | Stop reason: SDUPTHER

## 2018-07-24 RX ORDER — CYCLOBENZAPRINE HCL 10 MG
10 TABLET ORAL 3 TIMES DAILY
Qty: 90 TABLET | Refills: 11 | Status: SHIPPED | OUTPATIENT
Start: 2018-07-24 | End: 2018-09-20 | Stop reason: SDUPTHER

## 2018-07-24 NOTE — TELEPHONE ENCOUNTER
----- Message from Rachel Maravilla sent at 7/24/2018 11:23 AM CDT -----  Contact: Pt   Pt calling requesting refills on Percocet and Flexeril. Please send to [facility] pharmacy      Pt call back number [phone]

## 2018-07-30 ENCOUNTER — TELEPHONE (OUTPATIENT)
Dept: HEMATOLOGY/ONCOLOGY | Facility: CLINIC | Age: 38
End: 2018-07-30

## 2018-07-30 ENCOUNTER — PATIENT MESSAGE (OUTPATIENT)
Dept: HEMATOLOGY/ONCOLOGY | Facility: CLINIC | Age: 38
End: 2018-07-30

## 2018-07-30 NOTE — TELEPHONE ENCOUNTER
Letter provided for patient.      ----- Message from Rachel Maravilla sent at 7/30/2018  1:02 PM CDT -----  Contact: Pt  Pt calling requesting a letter to clear her for work. She would like to go to work on tomorrow         Pt call back number [phone]

## 2018-08-20 ENCOUNTER — OFFICE VISIT (OUTPATIENT)
Dept: HEMATOLOGY/ONCOLOGY | Facility: CLINIC | Age: 38
End: 2018-08-20
Payer: MEDICAID

## 2018-08-20 VITALS
TEMPERATURE: 99 F | RESPIRATION RATE: 19 BRPM | BODY MASS INDEX: 53.92 KG/M2 | OXYGEN SATURATION: 95 % | HEART RATE: 88 BPM | DIASTOLIC BLOOD PRESSURE: 98 MMHG | WEIGHT: 293 LBS | SYSTOLIC BLOOD PRESSURE: 142 MMHG | HEIGHT: 62 IN

## 2018-08-20 DIAGNOSIS — D57.40 SICKLE CELL BETA THALASSEMIA: ICD-10-CM

## 2018-08-20 DIAGNOSIS — I10 HYPERTENSION, UNSPECIFIED TYPE: ICD-10-CM

## 2018-08-20 DIAGNOSIS — D57.1 HB-SS DISEASE WITHOUT CRISIS: Primary | ICD-10-CM

## 2018-08-20 PROCEDURE — 99214 OFFICE O/P EST MOD 30 MIN: CPT | Mod: S$PBB,,, | Performed by: INTERNAL MEDICINE

## 2018-08-20 PROCEDURE — 99999 PR PBB SHADOW E&M-EST. PATIENT-LVL IV: CPT | Mod: PBBFAC,,, | Performed by: INTERNAL MEDICINE

## 2018-08-20 PROCEDURE — 99214 OFFICE O/P EST MOD 30 MIN: CPT | Mod: PBBFAC | Performed by: INTERNAL MEDICINE

## 2018-08-20 RX ORDER — OXYCODONE AND ACETAMINOPHEN 10; 325 MG/1; MG/1
1 TABLET ORAL EVERY 4 HOURS PRN
Qty: 120 TABLET | Refills: 0 | Status: SHIPPED | OUTPATIENT
Start: 2018-08-20 | End: 2018-09-20 | Stop reason: SDUPTHER

## 2018-08-20 RX ORDER — HYDROCHLOROTHIAZIDE 25 MG/1
25 TABLET ORAL DAILY
Qty: 30 TABLET | Refills: 11 | Status: ON HOLD | OUTPATIENT
Start: 2018-08-20 | End: 2019-05-04 | Stop reason: HOSPADM

## 2018-08-20 RX ORDER — OXYCODONE AND ACETAMINOPHEN 10; 325 MG/1; MG/1
1 TABLET ORAL EVERY 4 HOURS PRN
Qty: 120 TABLET | Refills: 0 | Status: SHIPPED | OUTPATIENT
Start: 2018-08-20 | End: 2018-08-20 | Stop reason: SDUPTHER

## 2018-08-20 NOTE — PROGRESS NOTES
SECTION OF HEMATOLOGY AND BONE MARROW TRANSPLANT  Return  Patient Visit   2018  Referred by:  No ref. provider found  Referred for: sickle beta thal     CHIEF COMPLAINT:   No chief complaint on file.      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  She established care with me in 2017.    She  moved around country ([place], [place], [place], now back in [place] permanently) and had hematologists taking care of her in each of these respective cities.  Her disease is notable for 1-3 VOC a year requiring ED presentation and admission.  She has episode of acute chest syndrome as young girl.  States she had subclinical stroke with no residual deficits.  Has never been on hydrea. Has history of HTN. Has 2 children.  Trained as LPN .     Has only required rare transfusion over the course of her life.    Since our last appt, notes stable VOC  Pain crises frequency.            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 she has never used smokeless tobacco. She reports that she does not drink alcohol or use drugs.    FAMILY HISTORY:  Family History   Problem Relation Age of Onset    Breast cancer Neg Hx     Ovarian cancer Neg Hx     Colon cancer Neg Hx        CURRENT MEDICATIONS:   Current Outpatient Medications   Medication Sig     albuterol (ACCUNEB) 0.63 mg/3 mL Nebu Take 3 mLs (0.63 mg total) by nebulization every 6 (six) hours as needed (for wheezing/shortness of breath). Rescue    albuterol (VENTOLIN HFA) 90 mcg/actuation inhaler Inhale 2 puffs into the lungs every 6 (six) hours as needed for Wheezing. Rescue    amLODIPine (NORVASC) 10 MG tablet Take 1 tablet (10 mg total) by mouth once daily.    baclofen (LIORESAL) 10 MG tablet Take 1 tablet (10 mg total) by mouth 2 (two) times daily.    budesonide-formoterol 160-4.5 mcg (SYMBICORT) 160-4.5 mcg/actuation HFAA Inhale 2 puffs into the lungs every 12 (twelve) hours. Controller    carBAMazepine (TEGRETOL) 200 mg tablet TAKE 4 TABLETS (800 MG TOTAL) BY MOUTH 2 (TWO) TIMES DAILY.    cyclobenzaprine (FLEXERIL) 10 MG tablet Take 1 tablet (10 mg total) by mouth 3 (three) times daily as needed.    cyclobenzaprine (FLEXERIL) 10 MG tablet Take 1 tablet (10 mg total) by mouth 3 (three) times daily as needed.    ergocalciferol (VITAMIN D2) 50,000 unit Cap Take 1 capsule (50,000 Units total) by mouth every 7 days.    FLUoxetine (PROZAC) 40 MG capsule Take 1 capsule (40 mg total) by mouth once daily.    gabapentin (NEURONTIN) 800 MG tablet Take 1 tablet (800 mg total) by mouth 3 (three) times daily.    hydrochlorothiazide (MICROZIDE) 12.5 mg capsule Take 1 capsule (12.5 mg total) by mouth once daily.    hydrOXYzine HCl (ATARAX) 25 MG tablet     mirtazapine (REMERON SOL-TAB) 45 MG disintegrating tablet Take 1 tablet (45 mg total) by mouth every evening.    ondansetron (ZOFRAN-ODT) 8 MG TbDL Take 1 tablet (8 mg total) by mouth every 6 (six) hours as needed.    oxyCODONE-acetaminophen (PERCOCET)  mg per tablet Take 1 tablet by mouth every 4 (four) hours as needed for Pain.    senna-docusate 8.6-50 mg (PERICOLACE) 8.6-50 mg per tablet Take 1 tablet by mouth 2 (two) times daily.    clonazePAM (KLONOPIN) 2 MG Tab Take 1 tablet (2 mg total) by mouth once daily.    glutamine, sickle  cell, (ENDARI) 5 gram PwPk Take 15 g by mouth 2 (two) times daily.    hydroCHLOROthiazide (HYDRODIURIL) 25 MG tablet Take 1 tablet (25 mg total) by mouth once daily.     No current facility-administered medications for this visit.      ALLERGIES:   Review of patient's allergies indicates:  No Known Allergies          REVIEW OF SYSTEMS:   General ROS: negative  Psychological ROS: negative  Ophthalmic ROS: negative  ENT ROS: negative  Allergy and Immunology ROS: negative  Hematological and Lymphatic ROS: negative  Endocrine ROS: negative  Respiratory ROS: negative  Cardiovascular ROS: negative  Gastrointestinal ROS: negative  Genito-Urinary ROS: negative  Musculoskeletal ROS: see HPI  Neurological ROS: negative  Dermatological ROS: negative    PHYSICAL EXAM:   Vitals:    08/20/18 1458   BP: (!) 142/98   Pulse: 88   Resp: 19   Temp: 98.9 °F (37.2 °C)       General - well developed, well nourished, no apparent distress  Head & Face - no sinus tenderness  Eyes - normal conjunctivae and lids   ENT - normal external auditory canals and tympanic membranes bilaterally oropharynx clear,  Normal dentition and gums  Neck - normal thyroid  Chest and Lung - normal respiratory effort, clear to auscultation bilaterally   Cardiovascular - RRR with no MGR, normal S1 and S2; no pedal edema  Abdomen -  soft, nontender, no palpable hepatomegaly or splenomegaly  Lymph - no palpable lymphadenopathy  Extremities - unremarkable nails and digits  Heme - no bruising, petechiae, pallor  Skin - no rashes or lesions  Psych - appropriate mood and affect      ECOG Performance Status: (foot note - ECOG PS provided by [employer]cology [employer]) 1 - Symptomatic but completely ambulatory    Karnofsky Performance Score:  90%- Able to Carry on Normal Activity: Minor Symptoms of Disease  DATA:   Lab Results   Component Value Date    WBC 5.48 07/20/2018    HGB 11.2 (L) 07/20/2018    HCT 34.5 (L) 07/20/2018    MCV 67 (L) 07/20/2018      07/20/2018     Gran # (ANC)   Date Value Ref Range Status   07/20/2018 2.9 1.8 - 7.7 K/uL Final     Gran%   Date Value Ref Range Status   07/20/2018 53.7 38.0 - 73.0 % Final     Lymph #   Date Value Ref Range Status   07/20/2018 2.0 1.0 - 4.8 K/uL Final     Lymph%   Date Value Ref Range Status   07/20/2018 36.5 18.0 - 48.0 % Final     CMP  Sodium   Date Value Ref Range Status   07/20/2018 139 136 - 145 mmol/L Final     Potassium   Date Value Ref Range Status   07/20/2018 3.7 3.5 - 5.1 mmol/L Final     Chloride   Date Value Ref Range Status   07/20/2018 104 95 - 110 mmol/L Final     CO2   Date Value Ref Range Status   07/20/2018 27 23 - 29 mmol/L Final     Glucose   Date Value Ref Range Status   07/20/2018 172 (H) 70 - 110 mg/dL Final     BUN, Bld   Date Value Ref Range Status   07/20/2018 13 6 - 20 mg/dL Final     Creatinine   Date Value Ref Range Status   07/20/2018 0.8 0.5 - 1.4 mg/dL Final     Calcium   Date Value Ref Range Status   07/20/2018 9.0 8.7 - 10.5 mg/dL Final     Total Protein   Date Value Ref Range Status   07/20/2018 7.3 6.0 - 8.4 g/dL Final     Albumin   Date Value Ref Range Status   07/20/2018 3.4 (L) 3.5 - 5.2 g/dL Final     Total Bilirubin   Date Value Ref Range Status   07/20/2018 0.4 0.1 - 1.0 mg/dL Final     Comment:     For infants and newborns, interpretation of results should be based  on gestational age, weight and in agreement with clinical  observations.  Premature Infant recommended reference ranges:  Up to 24 hours.............<8.0 mg/dL  Up to 48 hours............<12.0 mg/dL  3-5 days..................<15.0 mg/dL  6-29 days.................<15.0 mg/dL       Alkaline Phosphatase   Date Value Ref Range Status   07/20/2018 105 55 - 135 U/L Final     AST   Date Value Ref Range Status   07/20/2018 32 10 - 40 U/L Final     ALT   Date Value Ref Range Status   07/20/2018 26 10 - 44 U/L Final     Anion Gap   Date Value Ref Range Status   07/20/2018 8 8 - 16 mmol/L Final     eGFR if African  American   Date Value Ref Range Status   07/20/2018 >60.0 >60 mL/min/1.73 m^2 Final     eGFR if non    Date Value Ref Range Status   07/20/2018 >60.0 >60 mL/min/1.73 m^2 Final     Comment:     Calculation used to obtain the estimated glomerular filtration  rate (eGFR) is the CKD-EPI equation.        Quant 2.2 - 3.2 % 5.9    Hemoglobin Bands   Hb A , Hb S , Hb F , Hb A2   Hemoglobin Electrophoresis Interp   See comment   Comments: There are prominent bands in the A and S positions,   measuring approximately 20 % and 66 % respectively with a hemoglobin   F band of approximately 8% and an elevated hemoglobin A2.     This pattern suggests sickle cell/beta + thalassemia, provided that   there is no history of recent RBC transfusion.  Await acid   electrophoresis   for confirmation of hemoglobin S.   Interpreted by Violette Sutherland M.D.          ASSESSMENT AND PLAN:   Encounter Diagnoses   Name Primary?    Hb-SS disease without crisis Yes    Hypertension, unspecified type     Sickle cell beta thalassemia      -patient has sickle beta thallassemia   Sickle Cell Disease Monitoring   1)Hydrea  -previously 1-3 crises a year; per patient with increasing severity and frequency over last 2-3 years   -initiate hydrea 500mg BID (5/22/17) but she quickly self discontinued due to mild rash; she refuses to restart  -discussed  l-glutamine today and patient would like to attempt; start 15 g BID (8/20/18)      2)iron status   -history of  iron deficiency from menorrhagnia, likely contributing to fatigue/anemia  -received  IV feraheme x 2 march 2017 with return of her hgb back to baseline and normalized ferritin   -required repeat iron infusion July 2018 now with elevated ferritin and stable Hgb monitor     3)AVN  -has chronic bilateral hip pain   -will discuss obtaining MR if pain returns  4)LE Ulcerations   NA  5)HTN  -continue norvasc 10; BP elevated  Today   -increase  hctz to 25mg daily (8/20/18)  given  persistently elevated BP     6)Opthalmic  -needs opthalmology referral  -encouraged her to make appt with opthalmology     7)Pain  -continue home percocet 10 q 6 hrs   8)CardioPulmonary  -states  Had normal TTE by hematologist in dec 2016  -have requested report; next due dec 2018  -continue inhalers     9)Renal  - feb 2018 UA with no proteinuria; recheck feb 2019  10)Neuro/CVA  -gives anecdotal history of subclinical stroke with no deficits  -will monitor  11)Vaccines   -receiving HIB series, menactra, prevnar followed by pneumovax      12)Contraception  -she has had tubal ligation     -I had long discussion with patient regarding compliance with consultant appts; also discussed that she needs to establish care with an internist to manage her other chronic medical conditions like HTN, asthma, health maintenance, cancer screening and that I would be managing her sickle cell disease      Follow Up:  -cbc, cmp, retic, ferritin, type and screen, TTE  and MD appt in 3-4  months    Gregory Montgomery MD  Hematology/Oncology/Bone Marrow Transplant

## 2018-09-20 DIAGNOSIS — D57.40 SICKLE CELL BETA THALASSEMIA: ICD-10-CM

## 2018-09-20 DIAGNOSIS — M62.838 MUSCLE SPASM: ICD-10-CM

## 2018-09-20 RX ORDER — CYCLOBENZAPRINE HCL 10 MG
10 TABLET ORAL 3 TIMES DAILY
Qty: 90 TABLET | Refills: 11 | Status: SHIPPED | OUTPATIENT
Start: 2018-09-20 | End: 2019-04-15 | Stop reason: SDUPTHER

## 2018-09-20 RX ORDER — OXYCODONE AND ACETAMINOPHEN 10; 325 MG/1; MG/1
1 TABLET ORAL EVERY 4 HOURS PRN
Qty: 120 TABLET | Refills: 0 | Status: SHIPPED | OUTPATIENT
Start: 2018-09-20 | End: 2018-10-17 | Stop reason: SDUPTHER

## 2018-09-20 RX ORDER — OXYCODONE AND ACETAMINOPHEN 10; 325 MG/1; MG/1
1 TABLET ORAL EVERY 4 HOURS PRN
Qty: 120 TABLET | Refills: 0 | Status: CANCELLED | OUTPATIENT
Start: 2018-09-20

## 2018-09-24 ENCOUNTER — PATIENT MESSAGE (OUTPATIENT)
Dept: OPTOMETRY | Facility: CLINIC | Age: 38
End: 2018-09-24

## 2018-10-17 DIAGNOSIS — D57.40 SICKLE CELL BETA THALASSEMIA: ICD-10-CM

## 2018-10-17 RX ORDER — OXYCODONE AND ACETAMINOPHEN 10; 325 MG/1; MG/1
1 TABLET ORAL EVERY 4 HOURS PRN
Qty: 120 TABLET | Refills: 0 | Status: SHIPPED | OUTPATIENT
Start: 2018-10-17 | End: 2018-11-20 | Stop reason: SDUPTHER

## 2018-11-15 ENCOUNTER — PATIENT MESSAGE (OUTPATIENT)
Dept: INTERNAL MEDICINE | Facility: CLINIC | Age: 38
End: 2018-11-15

## 2018-11-15 ENCOUNTER — TELEPHONE (OUTPATIENT)
Dept: INTERNAL MEDICINE | Facility: CLINIC | Age: 38
End: 2018-11-15

## 2018-11-15 NOTE — TELEPHONE ENCOUNTER
----- Message from Marly Montanez sent at 11/14/2018  2:29 PM CST -----  Contact: [employer] request  Message     ----- Message from [employer], System Message sent at 11/14/2018  9:06 AM CST -----    Appointment Request From: Nazanin Malone    With Provider: Balta Forbes MD [[facility] - Internal Medicine]    Preferred Date Range: 11/14/2018 - 11/21/2018    Preferred Times: Any time    Reason for visit: Existing Patient    Comments:  possible upper respiratory infection

## 2018-11-16 ENCOUNTER — TELEPHONE (OUTPATIENT)
Dept: INTERNAL MEDICINE | Facility: CLINIC | Age: 38
End: 2018-11-16

## 2018-11-16 NOTE — TELEPHONE ENCOUNTER
Spoke with pt---attempted to schedule another appt---pt stated she would go to an urgent care or ER

## 2018-11-16 NOTE — TELEPHONE ENCOUNTER
----- Message from Soha Jacob sent at 11/16/2018  2:31 PM CST -----  Contact: self/[phone]  Caller is requesting a sooner appointment. Caller declined first available appointment listed below. Caller will not accept being placed on the wait list and is requesting a message be sent to the provider.    When is the next available appointment:  11/27/18  Did you offer to schedule the next available appt and put the patient on the wait list?:  Yes, no   What visit type: same day  Symptoms:  Cough, uri  Patient preference of timeframe to be scheduled:  Today, ASAP  What is the reason the patient is requesting a sooner appointment? (insurance terminating, changing jobs):    Would you prefer an answer via [employer]?:    Comments:  Patient stated that she over sleep because the whole night has not sleep because the cough. Patient was already told that we have the urgent care walk in. Please call and advise. Thank you

## 2018-11-20 DIAGNOSIS — D57.40 SICKLE CELL BETA THALASSEMIA: ICD-10-CM

## 2018-11-20 RX ORDER — OXYCODONE AND ACETAMINOPHEN 10; 325 MG/1; MG/1
1 TABLET ORAL EVERY 4 HOURS PRN
Qty: 120 TABLET | Refills: 0 | Status: SHIPPED | OUTPATIENT
Start: 2018-11-20 | End: 2018-12-17 | Stop reason: SDUPTHER

## 2018-11-27 DIAGNOSIS — F43.20 ADJUSTMENT DISORDER, UNSPECIFIED TYPE: ICD-10-CM

## 2018-11-27 RX ORDER — FLUOXETINE HYDROCHLORIDE 40 MG/1
40 CAPSULE ORAL DAILY
Qty: 30 CAPSULE | Refills: 2 | Status: SHIPPED | OUTPATIENT
Start: 2018-11-27 | End: 2019-03-30 | Stop reason: SDUPTHER

## 2018-11-27 NOTE — TELEPHONE ENCOUNTER
Hi, please call patient -- to set up a followup in clinic with Dr. Balta Forbes MD or with me.  I will send in one prescription of this medicine, but patient needs to be seen for any further prescriptions.  Thank you, Rusty Mcfarlane

## 2018-12-06 ENCOUNTER — TELEPHONE (OUTPATIENT)
Dept: INTERNAL MEDICINE | Facility: CLINIC | Age: 38
End: 2018-12-06

## 2018-12-06 ENCOUNTER — OFFICE VISIT (OUTPATIENT)
Dept: INTERNAL MEDICINE | Facility: CLINIC | Age: 38
End: 2018-12-06
Payer: MEDICAID

## 2018-12-06 ENCOUNTER — HOSPITAL ENCOUNTER (OUTPATIENT)
Dept: RADIOLOGY | Facility: HOSPITAL | Age: 38
Discharge: HOME | End: 2018-12-06
Attending: STUDENT IN AN ORGANIZED HEALTH CARE EDUCATION/TRAINING PROGRAM
Payer: MEDICAID

## 2018-12-06 DIAGNOSIS — J40 BRONCHITIS: ICD-10-CM

## 2018-12-06 DIAGNOSIS — J45.40 MODERATE PERSISTENT ASTHMA WITHOUT COMPLICATION: ICD-10-CM

## 2018-12-06 DIAGNOSIS — J40 BRONCHITIS: Primary | ICD-10-CM

## 2018-12-06 PROCEDURE — 99212 OFFICE O/P EST SF 10 MIN: CPT | Mod: PBBFAC | Performed by: STUDENT IN AN ORGANIZED HEALTH CARE EDUCATION/TRAINING PROGRAM

## 2018-12-06 PROCEDURE — 71046 X-RAY EXAM CHEST 2 VIEWS: CPT | Mod: TC

## 2018-12-06 PROCEDURE — 99999 PR PBB SHADOW E&M-EST. PATIENT-LVL II: CPT | Mod: PBBFAC,,, | Performed by: STUDENT IN AN ORGANIZED HEALTH CARE EDUCATION/TRAINING PROGRAM

## 2018-12-06 PROCEDURE — 71046 X-RAY EXAM CHEST 2 VIEWS: CPT | Mod: 26,,, | Performed by: RADIOLOGY

## 2018-12-06 PROCEDURE — 99213 OFFICE O/P EST LOW 20 MIN: CPT | Mod: S$PBB,,, | Performed by: STUDENT IN AN ORGANIZED HEALTH CARE EDUCATION/TRAINING PROGRAM

## 2018-12-06 RX ORDER — LEVOFLOXACIN 500 MG/1
500 TABLET, FILM COATED ORAL DAILY
Qty: 7 TABLET | Refills: 0 | Status: CANCELLED | OUTPATIENT
Start: 2018-12-06 | End: 2018-12-13

## 2018-12-06 RX ORDER — FLUTICASONE PROPIONATE 50 MCG
2 SPRAY, SUSPENSION (ML) NASAL DAILY
Qty: 16 G | Refills: 0 | Status: ON HOLD | OUTPATIENT
Start: 2018-12-06 | End: 2020-01-11 | Stop reason: CLARIF

## 2018-12-06 RX ORDER — ALBUTEROL SULFATE 90 UG/1
2 AEROSOL, METERED RESPIRATORY (INHALATION) EVERY 6 HOURS PRN
Qty: 18 G | Refills: 2 | Status: ON HOLD | OUTPATIENT
Start: 2018-12-06 | End: 2019-08-01 | Stop reason: SDUPTHER

## 2018-12-06 RX ORDER — BENZONATATE 100 MG/1
100 CAPSULE ORAL 3 TIMES DAILY PRN
Qty: 30 CAPSULE | Refills: 1 | Status: CANCELLED | OUTPATIENT
Start: 2018-12-06 | End: 2018-12-16

## 2018-12-06 RX ORDER — PROMETHAZINE HYDROCHLORIDE AND DEXTROMETHORPHAN HYDROBROMIDE 6.25; 15 MG/5ML; MG/5ML
2 SYRUP ORAL EVERY 4 HOURS PRN
Qty: 10 ML | Refills: 2 | Status: SHIPPED | OUTPATIENT
Start: 2018-12-06 | End: 2018-12-16

## 2018-12-06 RX ORDER — AMOXICILLIN AND CLAVULANATE POTASSIUM 500; 125 MG/1; MG/1
1 TABLET, FILM COATED ORAL 2 TIMES DAILY
Qty: 14 TABLET | Refills: 0 | Status: SHIPPED | OUTPATIENT
Start: 2018-12-06 | End: 2018-12-13

## 2018-12-06 NOTE — PROGRESS NOTES
Subjective:       Patient ID: Nazanin Malone is a 38 y.o. female.    Chief Complaint: No chief complaint on file.    Patient is a 38 y.o. AAF with PMH of sickle cell trait, HTN, obesity, asthma and anxiety who presents for urgent care visit with a persistent cough following an episode of flu like symptoms 1 week ago. Patient reports experiencing fevers, chills, vomiting and diarrhea that all stopped on Saturday. Patient reports that her cough has persisted and gotten worse. Patient reports the cough as constant throughout the day, productive of green sputum with specks of blood. Patient reports the use of OTC mucinex, Tylenol Cold and flu, and other OTC medications without any relief. Patient also reports post nasal drip but denies any further symptoms. Patient uses her rescue inhaler 2 times per week and is requesting a refill today. Patient denies any chest pain, SOB, or other warning signs.     Patient is a non-smoker, does not drink or use illicit substances.       Review of Systems   Constitutional: Negative for chills, fatigue and fever.   HENT: Positive for postnasal drip and rhinorrhea. Negative for sneezing and sore throat.    Eyes: Negative for visual disturbance.   Respiratory: Positive for cough and wheezing. Negative for chest tightness and shortness of breath.    Cardiovascular: Negative for chest pain.   Gastrointestinal: Negative for abdominal pain, diarrhea, nausea and vomiting.   Endocrine: Negative for cold intolerance, heat intolerance and polyuria.   Genitourinary: Negative.    Musculoskeletal: Negative.    Skin: Negative.    Allergic/Immunologic: Positive for immunocompromised state (sickle cell ).   Neurological: Negative for headaches.   Hematological: Negative.    Psychiatric/Behavioral: Negative.        Objective:      Physical Exam   Constitutional: She is oriented to person, place, and time. She appears well-developed and well-nourished.   Sickly appearing    HENT:   Head:  Normocephalic and atraumatic.   Mouth/Throat: No oropharyngeal exudate.   Eyes: EOM are normal. Pupils are equal, round, and reactive to light. No scleral icterus.   Neck: Normal range of motion. Neck supple. No JVD present.   Cardiovascular: Regular rhythm and normal heart sounds.   Pulmonary/Chest: No respiratory distress. She has wheezes. She has rales.   Abdominal: Soft. Bowel sounds are normal. She exhibits no distension.   Musculoskeletal: Normal range of motion. She exhibits no edema.   Neurological: She is alert and oriented to person, place, and time. No cranial nerve deficit.   Skin: Skin is warm. No erythema.   Psychiatric: She has a normal mood and affect.   Vitals reviewed.      Assessment:       1. Bronchitis    2. Moderate persistent asthma without complication        Plan:       Diagnoses and all orders for this visit:    Bronchitis  -     albuterol (VENTOLIN HFA) 90 mcg/actuation inhaler; Inhale 2 puffs into the lungs every 6 (six) hours as needed for Wheezing. Rescue  -     X-Ray Chest PA And Lateral; Future  -     fluticasone (FLONASE) 50 mcg/actuation nasal spray; 2 sprays (100 mcg total) by Each Nare route once daily.  -     promethazine-dextromethorphan (PROMETHAZINE-DM) 6.25-15 mg/5 mL Syrp; Take 2 mLs by mouth every 4 (four) hours as needed.  -     amoxicillin-clavulanate 500-125mg (AUGMENTIN) 500-125 mg Tab; Take 1 tablet (500 mg total) by mouth 2 (two) times daily. TAKE WITH FOOD AND PROBIOTIC for 7 days    Moderate persistent asthma without complication  -     albuterol (VENTOLIN HFA) 90 mcg/actuation inhaler; Inhale 2 puffs into the lungs every 6 (six) hours as needed for Wheezing. Rescue    Plan discussed with attending Dr. Haynes, further recommendations as per attending addendum. Please feel free to call with any questions or concerns.        Bailee Ventura MD  Internal Medicine  Resident Physician - PGY2

## 2018-12-06 NOTE — PROGRESS NOTES
Case discussed with Dr. Ventura. The only thing I would have done differently is the coding.   I would have coded this as pneumonia, because she is being treated for presumptive community acquired pneumonia as an outpatient  with a one week follow up visit   and an acute exacerbation of underlying moderately persistent asthma.

## 2018-12-06 NOTE — TELEPHONE ENCOUNTER
----- Message from Sav Farnco sent at 12/6/2018  3:57 PM CST -----  Contact: Patient [phone]  Telephone consult    She wants to discuss her medications with you, she thinks there was an error made.    Thank you

## 2018-12-17 DIAGNOSIS — D57.40 SICKLE CELL BETA THALASSEMIA: ICD-10-CM

## 2018-12-17 RX ORDER — OXYCODONE AND ACETAMINOPHEN 10; 325 MG/1; MG/1
1 TABLET ORAL EVERY 4 HOURS PRN
Qty: 120 TABLET | Refills: 0 | Status: SHIPPED | OUTPATIENT
Start: 2018-12-17 | End: 2019-01-15 | Stop reason: SDUPTHER

## 2018-12-18 ENCOUNTER — PATIENT MESSAGE (OUTPATIENT)
Dept: HEMATOLOGY/ONCOLOGY | Facility: CLINIC | Age: 38
End: 2018-12-18

## 2018-12-18 DIAGNOSIS — R11.0 NAUSEA: Primary | ICD-10-CM

## 2018-12-18 RX ORDER — PROCHLORPERAZINE MALEATE 10 MG
10 TABLET ORAL EVERY 6 HOURS PRN
Qty: 30 TABLET | Refills: 1 | Status: SHIPPED | OUTPATIENT
Start: 2018-12-18 | End: 2019-06-13 | Stop reason: SDUPTHER

## 2018-12-30 ENCOUNTER — PATIENT MESSAGE (OUTPATIENT)
Dept: HEMATOLOGY/ONCOLOGY | Facility: CLINIC | Age: 38
End: 2018-12-30

## 2018-12-31 DIAGNOSIS — D57.1 HB-SS DISEASE WITHOUT CRISIS: Primary | ICD-10-CM

## 2019-01-08 ENCOUNTER — HOSPITAL ENCOUNTER (OUTPATIENT)
Dept: CARDIOLOGY | Facility: CLINIC | Age: 39
Discharge: HOME | End: 2019-01-08
Attending: INTERNAL MEDICINE
Payer: MEDICAID

## 2019-01-08 ENCOUNTER — OFFICE VISIT (OUTPATIENT)
Dept: HEMATOLOGY/ONCOLOGY | Facility: CLINIC | Age: 39
End: 2019-01-08
Payer: MEDICAID

## 2019-01-08 VITALS
OXYGEN SATURATION: 93 % | SYSTOLIC BLOOD PRESSURE: 184 MMHG | WEIGHT: 293 LBS | DIASTOLIC BLOOD PRESSURE: 108 MMHG | HEART RATE: 88 BPM | HEIGHT: 62 IN | RESPIRATION RATE: 18 BRPM | BODY MASS INDEX: 53.92 KG/M2 | TEMPERATURE: 99 F

## 2019-01-08 VITALS
WEIGHT: 293 LBS | DIASTOLIC BLOOD PRESSURE: 98 MMHG | SYSTOLIC BLOOD PRESSURE: 142 MMHG | HEART RATE: 86 BPM | HEIGHT: 62 IN | BODY MASS INDEX: 53.92 KG/M2

## 2019-01-08 DIAGNOSIS — I10 HYPERTENSION, UNSPECIFIED TYPE: ICD-10-CM

## 2019-01-08 DIAGNOSIS — D57.1 HB-SS DISEASE WITHOUT CRISIS: ICD-10-CM

## 2019-01-08 DIAGNOSIS — D57.00 HB-SS DISEASE WITH CRISIS: Primary | ICD-10-CM

## 2019-01-08 LAB
BSA FOR ECHO PROCEDURE: 2.55 M2
CV ECHO LV RWT: 0.27 CM
DOP CALC LVOT AREA: 3.59 CM2
DOP CALC LVOT DIAMETER: 2.14 CM
E WAVE DECELERATION TIME: 246.49 MSEC
E/A RATIO: 1.12
E/E' RATIO: 11.86
ECHO LV POSTERIOR WALL: 0.77 CM (ref 0.6–1.1)
FRACTIONAL SHORTENING: 32 % (ref 28–44)
INTERVENTRICULAR SEPTUM: 0.81 CM (ref 0.6–1.1)
IVRT: 0.1 MSEC
LA MAJOR: 4.09 CM
LA MINOR: 5.39 CM
LA WIDTH: 3.03 CM
LEFT ATRIUM SIZE: 3.4 CM
LEFT ATRIUM VOLUME INDEX: 17.3 ML/M2
LEFT ATRIUM VOLUME: 40.73 CM3
LEFT INTERNAL DIMENSION IN SYSTOLE: 3.84 CM (ref 2.1–4)
LEFT VENTRICLE DIASTOLIC VOLUME INDEX: 66.81 ML/M2
LEFT VENTRICLE DIASTOLIC VOLUME: 157.58 ML
LEFT VENTRICLE MASS INDEX: 70.2 G/M2
LEFT VENTRICLE SYSTOLIC VOLUME INDEX: 26.9 ML/M2
LEFT VENTRICLE SYSTOLIC VOLUME: 63.47 ML
LEFT VENTRICULAR INTERNAL DIMENSION IN DIASTOLE: 5.66 CM (ref 3.5–6)
LEFT VENTRICULAR MASS: 165.49 G
LV LATERAL E/E' RATIO: 11.86
LV SEPTAL E/E' RATIO: 11.86
MV PEAK A VEL: 0.74 M/S
MV PEAK E VEL: 0.83 M/S
PISA TR MAX VEL: 2.23 M/S
PULM VEIN S/D RATIO: 1.34
PV PEAK D VEL: 0.35 M/S
PV PEAK S VEL: 0.47 M/S
RA MAJOR: 3.65 CM
RA PRESSURE: 3 MMHG
RA WIDTH: 2.32 CM
RIGHT VENTRICULAR END-DIASTOLIC DIMENSION: 2.78 CM
RV TISSUE DOPPLER FREE WALL SYSTOLIC VELOCITY 1 (APICAL 4 CHAMBER VIEW): 11.89 M/S
SINUS: 3.2 CM
TDI LATERAL: 0.07
TDI SEPTAL: 0.07
TDI: 0.07
TR MAX PG: 19.89 MMHG
TRICUSPID ANNULAR PLANE SYSTOLIC EXCURSION: 2.1 CM
TV REST PULMONARY ARTERY PRESSURE: 23 MMHG

## 2019-01-08 PROCEDURE — 99214 PR OFFICE/OUTPT VISIT, EST, LEVL IV, 30-39 MIN: ICD-10-PCS | Mod: S$PBB,,, | Performed by: INTERNAL MEDICINE

## 2019-01-08 PROCEDURE — 93306 TRANSTHORACIC ECHO (TTE) LIMITED: ICD-10-PCS | Mod: 26,S$PBB,, | Performed by: INTERNAL MEDICINE

## 2019-01-08 PROCEDURE — 99214 OFFICE O/P EST MOD 30 MIN: CPT | Mod: PBBFAC,25 | Performed by: INTERNAL MEDICINE

## 2019-01-08 PROCEDURE — 99999 PR PBB SHADOW E&M-EST. PATIENT-LVL IV: ICD-10-PCS | Mod: PBBFAC,,, | Performed by: INTERNAL MEDICINE

## 2019-01-08 PROCEDURE — 99999 PR PBB SHADOW E&M-EST. PATIENT-LVL IV: CPT | Mod: PBBFAC,,, | Performed by: INTERNAL MEDICINE

## 2019-01-08 PROCEDURE — 93306 TTE W/DOPPLER COMPLETE: CPT | Mod: PBBFAC | Performed by: INTERNAL MEDICINE

## 2019-01-08 PROCEDURE — 99214 OFFICE O/P EST MOD 30 MIN: CPT | Mod: S$PBB,,, | Performed by: INTERNAL MEDICINE

## 2019-01-08 NOTE — PROGRESS NOTES
SECTION OF HEMATOLOGY AND BONE MARROW TRANSPLANT  Return  Patient Visit   2019  Referred by:  No ref. provider found  Referred for: sickle beta thal     CHIEF COMPLAINT:   Chief Complaint   Patient presents with    Cough       HISTORY OF PRESENT ILLNESS:   38 y.o. 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  She established care with me in 2017.    She  moved around country ([place], [place], [place], now back in [place] permanently) and had hematologists taking care of her in each of these respective cities.  Her disease is notable for 1-3 VOC a year requiring ED presentation and admission.  She has episode of acute chest syndrome as young girl.  States she had subclinical stroke with no residual deficits.  Has never been on hydrea. Has history of HTN. Has 2 children.  Trained as LPN .     Has only required rare transfusion over the course of her life.    Since our last appt, notes stable VOC  Pain crises frequency.            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 she has never used smokeless tobacco. She reports that she does not drink alcohol or use drugs.    FAMILY HISTORY:  Family History   Problem Relation Age of Onset    Breast cancer Neg Hx     Ovarian cancer Neg Hx     Colon cancer Neg Hx        CURRENT MEDICATIONS:   Current Outpatient  Medications   Medication Sig    albuterol (ACCUNEB) 0.63 mg/3 mL Nebu Take 3 mLs (0.63 mg total) by nebulization every 6 (six) hours as needed (for wheezing/shortness of breath). Rescue    albuterol (VENTOLIN HFA) 90 mcg/actuation inhaler Inhale 2 puffs into the lungs every 6 (six) hours as needed for Wheezing. Rescue    amLODIPine (NORVASC) 10 MG tablet Take 1 tablet (10 mg total) by mouth once daily.    baclofen (LIORESAL) 10 MG tablet Take 1 tablet (10 mg total) by mouth 2 (two) times daily.    budesonide-formoterol 160-4.5 mcg (SYMBICORT) 160-4.5 mcg/actuation HFAA Inhale 2 puffs into the lungs every 12 (twelve) hours. Controller    carBAMazepine (TEGRETOL) 200 mg tablet TAKE 4 TABLETS (800 MG TOTAL) BY MOUTH 2 (TWO) TIMES DAILY.    cyclobenzaprine (FLEXERIL) 10 MG tablet Take 1 tablet (10 mg total) by mouth 3 (three) times daily as needed.    cyclobenzaprine (FLEXERIL) 10 MG tablet Take 1 tablet (10 mg total) by mouth 3 (three) times daily as needed.    ergocalciferol (VITAMIN D2) 50,000 unit Cap Take 1 capsule (50,000 Units total) by mouth every 7 days.    FLUoxetine (PROZAC) 40 MG capsule TAKE 1 CAPSULE (40 MG TOTAL) BY MOUTH ONCE DAILY.    fluticasone (FLONASE) 50 mcg/actuation nasal spray 2 sprays (100 mcg total) by Each Nare route once daily.    gabapentin (NEURONTIN) 800 MG tablet Take 1 tablet (800 mg total) by mouth 3 (three) times daily.    hydroCHLOROthiazide (HYDRODIURIL) 25 MG tablet Take 1 tablet (25 mg total) by mouth once daily.    hydrOXYzine HCl (ATARAX) 25 MG tablet     mirtazapine (REMERON SOL-TAB) 45 MG disintegrating tablet Take 1 tablet (45 mg total) by mouth every evening.    ondansetron (ZOFRAN-ODT) 8 MG TbDL Take 1 tablet (8 mg total) by mouth every 6 (six) hours as needed.    oxyCODONE-acetaminophen (PERCOCET)  mg per tablet Take 1 tablet by mouth every 4 (four) hours as needed for Pain.    prochlorperazine (COMPAZINE) 10 MG tablet Take 1 tablet (10 mg total) by  mouth every 6 (six) hours as needed.    senna-docusate 8.6-50 mg (PERICOLACE) 8.6-50 mg per tablet Take 1 tablet by mouth 2 (two) times daily.    clonazePAM (KLONOPIN) 2 MG Tab Take 1 tablet (2 mg total) by mouth once daily.     No current facility-administered medications for this visit.      ALLERGIES:   Review of patient's allergies indicates:  No Known Allergies          REVIEW OF SYSTEMS:   General ROS: negative  Psychological ROS: negative  Ophthalmic ROS: negative  ENT ROS: negative  Allergy and Immunology ROS: negative  Hematological and Lymphatic ROS: negative  Endocrine ROS: negative  Respiratory ROS: negative  Cardiovascular ROS: negative  Gastrointestinal ROS: negative  Genito-Urinary ROS: negative  Musculoskeletal ROS: see HPI  Neurological ROS: negative  Dermatological ROS: negative    PHYSICAL EXAM:   Vitals:    01/08/19 1126   BP: (!) 184/108   Pulse: 88   Resp: 18   Temp: 98.5 °F (36.9 °C)       General - well developed, well nourished, no apparent distress  Head & Face - no sinus tenderness  Eyes - normal conjunctivae and lids   ENT - normal external auditory canals and tympanic membranes bilaterally oropharynx clear,  Normal dentition and gums  Neck - normal thyroid  Chest and Lung - normal respiratory effort, clear to auscultation bilaterally   Cardiovascular - RRR with no MGR, normal S1 and S2; no pedal edema  Abdomen -  soft, nontender, no palpable hepatomegaly or splenomegaly  Lymph - no palpable lymphadenopathy  Extremities - unremarkable nails and digits  Heme - no bruising, petechiae, pallor  Skin - no rashes or lesions  Psych - appropriate mood and affect      ECOG Performance Status: (foot note - ECOG PS provided by [employer]cology [employer]) 1 - Symptomatic but completely ambulatory    Karnofsky Performance Score:  90%- Able to Carry on Normal Activity: Minor Symptoms of Disease  DATA:   Lab Results   Component Value Date    WBC 7.01 01/08/2019    HGB 11.8 (L) 01/08/2019     HCT 36.0 (L) 01/08/2019    MCV 73 (L) 01/08/2019     01/08/2019     Gran # (ANC)   Date Value Ref Range Status   01/08/2019 3.2 1.8 - 7.7 K/uL Final     Gran%   Date Value Ref Range Status   01/08/2019 45.1 38.0 - 73.0 % Final     Lymph #   Date Value Ref Range Status   01/08/2019 2.9 1.0 - 4.8 K/uL Final     Lymph%   Date Value Ref Range Status   01/08/2019 41.5 18.0 - 48.0 % Final     CMP  Sodium   Date Value Ref Range Status   01/08/2019 138 136 - 145 mmol/L Final     Potassium   Date Value Ref Range Status   01/08/2019 3.7 3.5 - 5.1 mmol/L Final     Chloride   Date Value Ref Range Status   01/08/2019 103 95 - 110 mmol/L Final     CO2   Date Value Ref Range Status   01/08/2019 28 23 - 29 mmol/L Final     Glucose   Date Value Ref Range Status   01/08/2019 134 (H) 70 - 110 mg/dL Final     BUN, Bld   Date Value Ref Range Status   01/08/2019 13 6 - 20 mg/dL Final     Creatinine   Date Value Ref Range Status   01/08/2019 0.9 0.5 - 1.4 mg/dL Final     Calcium   Date Value Ref Range Status   01/08/2019 9.5 8.7 - 10.5 mg/dL Final     Total Protein   Date Value Ref Range Status   01/08/2019 8.2 6.0 - 8.4 g/dL Final     Albumin   Date Value Ref Range Status   01/08/2019 3.9 3.5 - 5.2 g/dL Final     Total Bilirubin   Date Value Ref Range Status   01/08/2019 0.4 0.1 - 1.0 mg/dL Final     Comment:     For infants and newborns, interpretation of results should be based  on gestational age, weight and in agreement with clinical  observations.  Premature Infant recommended reference ranges:  Up to 24 hours.............<8.0 mg/dL  Up to 48 hours............<12.0 mg/dL  3-5 days..................<15.0 mg/dL  6-29 days.................<15.0 mg/dL       Alkaline Phosphatase   Date Value Ref Range Status   01/08/2019 93 55 - 135 U/L Final     AST   Date Value Ref Range Status   01/08/2019 16 10 - 40 U/L Final     ALT   Date Value Ref Range Status   01/08/2019 12 10 - 44 U/L Final     Anion Gap   Date Value Ref Range Status    01/08/2019 7 (L) 8 - 16 mmol/L Final     eGFR if    Date Value Ref Range Status   01/08/2019 >60.0 >60 mL/min/1.73 m^2 Final     eGFR if non    Date Value Ref Range Status   01/08/2019 >60.0 >60 mL/min/1.73 m^2 Final     Comment:     Calculation used to obtain the estimated glomerular filtration  rate (eGFR) is the CKD-EPI equation.        Quant 2.2 - 3.2 % 5.9    Hemoglobin Bands   Hb A , Hb S , Hb F , Hb A2   Hemoglobin Electrophoresis Interp   See comment   Comments: There are prominent bands in the A and S positions,   measuring approximately 20 % and 66 % respectively with a hemoglobin   F band of approximately 8% and an elevated hemoglobin A2.     This pattern suggests sickle cell/beta + thalassemia, provided that   there is no history of recent RBC transfusion.  Await acid   electrophoresis   for confirmation of hemoglobin S.   Interpreted by Violette Sutherland M.D.          ASSESSMENT AND PLAN:   Encounter Diagnosis   Name Primary?    Hb-SS disease with crisis Yes     -patient has sickle beta thallassemia   Sickle Cell Disease Monitoring   1)Hydrea  -previously 1-3 crises a year; per patient with increasing severity and frequency over last 2-3 years   -initiate hydrea 500mg BID (5/22/17) but she quickly self discontinued due to mild rash; she refuses to restart  -discussed  l-glutamine today and patient would like to attempt; prescribed  15 g BID (8/20/18) but insurance refused to cover; will inquire about pt assistance       2)iron status   -history of  iron deficiency from menorrhagnia, likely contributing to fatigue/anemia  -received  IV feraheme x 2 march 2017 with return of her hgb back to baseline and normalized ferritin   -required repeat iron infusion July 2018 (only showed up for 1 infusion); hgb baseline and ferritin wnl today     3)AVN  -has chronic bilateral hip pain   -will discuss obtaining MR if pain returns  4)LE Ulcerations   NA  5)HTN  -continue norvasc  10; BP elevated  Today   -  hctz to 25mg daily (8/20/18)  given persistently elevated BP ; did not take today; encouraged compliance     6)Opthalmic  -needs opthalmology referral  -encouraged her to make appt with opthalmology     7)Pain  -continue home percocet 10 q 6 hrs   8)CardioPulmonary  -states  Had normal TTE jan 2019; next due jan 2021  -continue inhalers     9)Renal  - feb 2018 UA with no proteinuria; [email or web address]  10)Neuro/CVA  -gives anecdotal history of subclinical stroke with no deficits  -will monitor  11)Vaccines   -receiving HIB series, menactra, prevnar followed by pneumovax      12)Contraception  -she has had tubal ligation     -encouraged her to set up new pcp and gyne     Follow Up:  -cbc, cmp, retic, ferritin, type and screen, and NP appt in 3 months     Gregory Montgomery MD  Hematology/Oncology/Bone Marrow Transplant    endari -  Cant get   Urine protein next visit

## 2019-01-08 NOTE — PROGRESS NOTES
Consent obtained. 22 g sl started in right forearm for optison use. optison given ivp via sl for imaging. Denies transfusion rxn. Tolerated well. Sl d/blair after. Pressure applied.

## 2019-01-15 DIAGNOSIS — D57.40 SICKLE CELL BETA THALASSEMIA: ICD-10-CM

## 2019-01-15 RX ORDER — OXYCODONE AND ACETAMINOPHEN 10; 325 MG/1; MG/1
1 TABLET ORAL EVERY 4 HOURS PRN
Qty: 120 TABLET | Refills: 0 | Status: SHIPPED | OUTPATIENT
Start: 2019-01-15 | End: 2019-02-18 | Stop reason: SDUPTHER

## 2019-02-18 DIAGNOSIS — D57.40 SICKLE CELL BETA THALASSEMIA: ICD-10-CM

## 2019-02-19 ENCOUNTER — PATIENT MESSAGE (OUTPATIENT)
Dept: HEMATOLOGY/ONCOLOGY | Facility: CLINIC | Age: 39
End: 2019-02-19

## 2019-02-19 RX ORDER — OXYCODONE AND ACETAMINOPHEN 10; 325 MG/1; MG/1
1 TABLET ORAL EVERY 4 HOURS PRN
Qty: 120 TABLET | Refills: 0 | Status: SHIPPED | OUTPATIENT
Start: 2019-02-19 | End: 2019-03-15 | Stop reason: SDUPTHER

## 2019-02-19 RX ORDER — CYCLOBENZAPRINE HCL 10 MG
10 TABLET ORAL 3 TIMES DAILY
Qty: 90 TABLET | Refills: 11 | Status: SHIPPED | OUTPATIENT
Start: 2019-02-19 | End: 2019-05-22 | Stop reason: SDUPTHER

## 2019-02-26 DIAGNOSIS — I10 ESSENTIAL HYPERTENSION: ICD-10-CM

## 2019-02-26 RX ORDER — AMLODIPINE BESYLATE 10 MG/1
TABLET ORAL
Qty: 30 TABLET | Refills: 2 | Status: SHIPPED | OUTPATIENT
Start: 2019-02-26 | End: 2019-03-15 | Stop reason: SDUPTHER

## 2019-03-03 ENCOUNTER — HOSPITAL ENCOUNTER (EMERGENCY)
Facility: HOSPITAL | Age: 39
Discharge: HOME | End: 2019-03-03
Attending: EMERGENCY MEDICINE
Payer: MEDICAID

## 2019-03-03 VITALS
OXYGEN SATURATION: 98 % | BODY MASS INDEX: 53.92 KG/M2 | TEMPERATURE: 99 F | RESPIRATION RATE: 18 BRPM | DIASTOLIC BLOOD PRESSURE: 83 MMHG | SYSTOLIC BLOOD PRESSURE: 145 MMHG | HEART RATE: 108 BPM | HEIGHT: 62 IN | WEIGHT: 293 LBS

## 2019-03-03 DIAGNOSIS — J18.9 PNEUMONIA OF LEFT LUNG DUE TO INFECTIOUS ORGANISM, UNSPECIFIED PART OF LUNG: Primary | ICD-10-CM

## 2019-03-03 LAB
ALBUMIN SERPL BCP-MCNC: 3.9 G/DL
ALP SERPL-CCNC: 107 U/L
ALT SERPL W/O P-5'-P-CCNC: 13 U/L
ANION GAP SERPL CALC-SCNC: 12 MMOL/L
AST SERPL-CCNC: 28 U/L
BACTERIA #/AREA URNS AUTO: ABNORMAL /HPF
BASOPHILS # BLD AUTO: 0.04 K/UL
BASOPHILS NFR BLD: 0.6 %
BILIRUB SERPL-MCNC: 0.4 MG/DL
BILIRUB UR QL STRIP: NEGATIVE
BUN SERPL-MCNC: 6 MG/DL
CALCIUM SERPL-MCNC: 9.4 MG/DL
CHLORIDE SERPL-SCNC: 106 MMOL/L
CLARITY UR REFRACT.AUTO: ABNORMAL
CO2 SERPL-SCNC: 22 MMOL/L
COLOR UR AUTO: YELLOW
CREAT SERPL-MCNC: 0.8 MG/DL
DIFFERENTIAL METHOD: ABNORMAL
EOSINOPHIL # BLD AUTO: 0.2 K/UL
EOSINOPHIL NFR BLD: 3.6 %
ERYTHROCYTE [DISTWIDTH] IN BLOOD BY AUTOMATED COUNT: 15.8 %
EST. GFR  (AFRICAN AMERICAN): >60 ML/MIN/1.73 M^2
EST. GFR  (NON AFRICAN AMERICAN): >60 ML/MIN/1.73 M^2
GLUCOSE SERPL-MCNC: 104 MG/DL
GLUCOSE UR QL STRIP: NEGATIVE
HCT VFR BLD AUTO: 37.2 %
HGB BLD-MCNC: 12.6 G/DL
HGB UR QL STRIP: ABNORMAL
HYALINE CASTS UR QL AUTO: 0 /LPF
IMM GRANULOCYTES # BLD AUTO: 0.02 K/UL
IMM GRANULOCYTES NFR BLD AUTO: 0.3 %
KETONES UR QL STRIP: NEGATIVE
LACTATE SERPL-SCNC: 0.9 MMOL/L
LEUKOCYTE ESTERASE UR QL STRIP: ABNORMAL
LYMPHOCYTES # BLD AUTO: 1.7 K/UL
LYMPHOCYTES NFR BLD: 26.4 %
MCH RBC QN AUTO: 23.5 PG
MCHC RBC AUTO-ENTMCNC: 33.9 G/DL
MCV RBC AUTO: 69 FL
MICROSCOPIC COMMENT: ABNORMAL
MONOCYTES # BLD AUTO: 0.8 K/UL
MONOCYTES NFR BLD: 11.7 %
NEUTROPHILS # BLD AUTO: 3.8 K/UL
NEUTROPHILS NFR BLD: 57.4 %
NITRITE UR QL STRIP: NEGATIVE
NRBC BLD-RTO: 0 /100 WBC
PH UR STRIP: 5 [PH] (ref 5–8)
PLATELET # BLD AUTO: 236 K/UL
PMV BLD AUTO: 10.2 FL
POTASSIUM SERPL-SCNC: 3.9 MMOL/L
PROT SERPL-MCNC: 8.3 G/DL
PROT UR QL STRIP: ABNORMAL
RBC # BLD AUTO: 5.36 M/UL
RBC #/AREA URNS AUTO: 4 /HPF (ref 0–4)
SODIUM SERPL-SCNC: 140 MMOL/L
SP GR UR STRIP: 1.01 (ref 1–1.03)
SQUAMOUS #/AREA URNS AUTO: 73 /HPF
URN SPEC COLLECT METH UR: ABNORMAL
WBC # BLD AUTO: 6.58 K/UL
WBC #/AREA URNS AUTO: 5 /HPF (ref 0–5)

## 2019-03-03 PROCEDURE — 87040 BLOOD CULTURE FOR BACTERIA: CPT | Mod: 59

## 2019-03-03 PROCEDURE — 99284 PR EMERGENCY DEPT VISIT,LEVEL IV: ICD-10-PCS | Mod: ,,, | Performed by: EMERGENCY MEDICINE

## 2019-03-03 PROCEDURE — S5010 5% DEXTROSE AND 0.45% SALINE: HCPCS | Performed by: EMERGENCY MEDICINE

## 2019-03-03 PROCEDURE — 99284 EMERGENCY DEPT VISIT MOD MDM: CPT | Mod: ,,, | Performed by: EMERGENCY MEDICINE

## 2019-03-03 PROCEDURE — 25000003 PHARM REV CODE 250: Performed by: EMERGENCY MEDICINE

## 2019-03-03 PROCEDURE — 96375 TX/PRO/DX INJ NEW DRUG ADDON: CPT

## 2019-03-03 PROCEDURE — 96372 THER/PROPH/DIAG INJ SC/IM: CPT | Mod: 59

## 2019-03-03 PROCEDURE — 83605 ASSAY OF LACTIC ACID: CPT

## 2019-03-03 PROCEDURE — 94761 N-INVAS EAR/PLS OXIMETRY MLT: CPT

## 2019-03-03 PROCEDURE — 96374 THER/PROPH/DIAG INJ IV PUSH: CPT

## 2019-03-03 PROCEDURE — 80053 COMPREHEN METABOLIC PANEL: CPT

## 2019-03-03 PROCEDURE — 25000242 PHARM REV CODE 250 ALT 637 W/ HCPCS: Performed by: EMERGENCY MEDICINE

## 2019-03-03 PROCEDURE — 63600175 PHARM REV CODE 636 W HCPCS: Performed by: EMERGENCY MEDICINE

## 2019-03-03 PROCEDURE — 81001 URINALYSIS AUTO W/SCOPE: CPT

## 2019-03-03 PROCEDURE — 94640 AIRWAY INHALATION TREATMENT: CPT

## 2019-03-03 PROCEDURE — 99284 EMERGENCY DEPT VISIT MOD MDM: CPT | Mod: 25

## 2019-03-03 PROCEDURE — 96376 TX/PRO/DX INJ SAME DRUG ADON: CPT

## 2019-03-03 PROCEDURE — 85025 COMPLETE CBC W/AUTO DIFF WBC: CPT

## 2019-03-03 RX ORDER — LEVOFLOXACIN 750 MG/1
750 TABLET ORAL ONCE
Status: COMPLETED | OUTPATIENT
Start: 2019-03-03 | End: 2019-03-03

## 2019-03-03 RX ORDER — LEVOFLOXACIN 500 MG/1
750 TABLET, FILM COATED ORAL DAILY
Qty: 5 TABLET | Refills: 0 | Status: SHIPPED | OUTPATIENT
Start: 2019-03-03 | End: 2019-03-08

## 2019-03-03 RX ORDER — MORPHINE SULFATE 4 MG/ML
10 INJECTION, SOLUTION INTRAMUSCULAR; INTRAVENOUS
Status: DISCONTINUED | OUTPATIENT
Start: 2019-03-03 | End: 2019-03-03

## 2019-03-03 RX ORDER — DEXTROSE MONOHYDRATE AND SODIUM CHLORIDE 5; .45 G/100ML; G/100ML
1000 INJECTION, SOLUTION INTRAVENOUS
Status: COMPLETED | OUTPATIENT
Start: 2019-03-03 | End: 2019-03-03

## 2019-03-03 RX ORDER — HYDROMORPHONE HYDROCHLORIDE 2 MG/ML
2 INJECTION, SOLUTION INTRAMUSCULAR; INTRAVENOUS; SUBCUTANEOUS
Status: COMPLETED | OUTPATIENT
Start: 2019-03-03 | End: 2019-03-03

## 2019-03-03 RX ORDER — IPRATROPIUM BROMIDE AND ALBUTEROL SULFATE 2.5; .5 MG/3ML; MG/3ML
3 SOLUTION RESPIRATORY (INHALATION)
Status: COMPLETED | OUTPATIENT
Start: 2019-03-03 | End: 2019-03-03

## 2019-03-03 RX ORDER — HYDROMORPHONE HYDROCHLORIDE 1 MG/ML
1 INJECTION, SOLUTION INTRAMUSCULAR; INTRAVENOUS; SUBCUTANEOUS
Status: COMPLETED | OUTPATIENT
Start: 2019-03-03 | End: 2019-03-03

## 2019-03-03 RX ORDER — ONDANSETRON 2 MG/ML
4 INJECTION INTRAMUSCULAR; INTRAVENOUS
Status: DISCONTINUED | OUTPATIENT
Start: 2019-03-03 | End: 2019-03-03

## 2019-03-03 RX ORDER — LEVOFLOXACIN 750 MG/1
750 TABLET ORAL DAILY
Status: DISCONTINUED | OUTPATIENT
Start: 2019-03-04 | End: 2019-03-03 | Stop reason: HOSPADM

## 2019-03-03 RX ORDER — LEVOFLOXACIN 500 MG/1
750 TABLET, FILM COATED ORAL DAILY
Qty: 5 TABLET | Refills: 0 | Status: SHIPPED | OUTPATIENT
Start: 2019-03-03 | End: 2019-03-03 | Stop reason: SDUPTHER

## 2019-03-03 RX ORDER — ONDANSETRON 2 MG/ML
4 INJECTION INTRAMUSCULAR; INTRAVENOUS
Status: COMPLETED | OUTPATIENT
Start: 2019-03-03 | End: 2019-03-03

## 2019-03-03 RX ORDER — ACETAMINOPHEN 500 MG
1000 TABLET ORAL
Status: COMPLETED | OUTPATIENT
Start: 2019-03-03 | End: 2019-03-03

## 2019-03-03 RX ORDER — CEFTRIAXONE 1 G/1
1 INJECTION, POWDER, FOR SOLUTION INTRAMUSCULAR; INTRAVENOUS
Status: COMPLETED | OUTPATIENT
Start: 2019-03-03 | End: 2019-03-03

## 2019-03-03 RX ADMIN — IPRATROPIUM BROMIDE AND ALBUTEROL SULFATE 3 ML: .5; 3 SOLUTION RESPIRATORY (INHALATION) at 01:03

## 2019-03-03 RX ADMIN — CEFTRIAXONE SODIUM 1 G: 1 INJECTION, POWDER, FOR SOLUTION INTRAMUSCULAR; INTRAVENOUS at 02:03

## 2019-03-03 RX ADMIN — HYDROMORPHONE HYDROCHLORIDE 2 MG: 2 INJECTION INTRAMUSCULAR; INTRAVENOUS; SUBCUTANEOUS at 03:03

## 2019-03-03 RX ADMIN — LEVOFLOXACIN 750 MG: 750 TABLET, FILM COATED ORAL at 04:03

## 2019-03-03 RX ADMIN — AZITHROMYCIN MONOHYDRATE 500 MG: 500 INJECTION, POWDER, LYOPHILIZED, FOR SOLUTION INTRAVENOUS at 03:03

## 2019-03-03 RX ADMIN — HYDROMORPHONE HYDROCHLORIDE 1 MG: 1 INJECTION, SOLUTION INTRAMUSCULAR; INTRAVENOUS; SUBCUTANEOUS at 02:03

## 2019-03-03 RX ADMIN — ACETAMINOPHEN 1000 MG: 500 TABLET ORAL at 02:03

## 2019-03-03 RX ADMIN — ONDANSETRON 4 MG: 2 INJECTION INTRAMUSCULAR; INTRAVENOUS at 02:03

## 2019-03-03 RX ADMIN — DEXTROSE AND SODIUM CHLORIDE 1000 ML: 5; .45 INJECTION, SOLUTION INTRAVENOUS at 02:03

## 2019-03-03 NOTE — ED PROVIDER NOTES
Encounter Date: 3/3/2019    SCRIBE #1 NOTE: I, Mc Zavaleta, am scribing for, and in the presence of,  Jesse Anthony MD. I have scribed the following portions of the note - Other sections scribed: HPI, ROS, PE.       History     Chief Complaint   Patient presents with    Multiple Complaints     wheezing, trouble breathing, feels like pump isn't working, asthma, and both leg hurting, right worse, reports fever      The patient is a 38 y.o. female with co-morbidities including HTN, trigeminal neuralgia, morbid obesity, sick cell-beta thalassemia, abnormal pap smear of cervix, hx of opioid dependence, hx of PNA, hx of sepsis due to PNA, and acute chest syndrome due to hemoglobin S disease  who presents to the ED with a complaint of SOB for 2 days. Pt reports fever, cough, and muscle aches for 2 days, further endorsing to a painful headache this morning which she states made her cry. She notes that her headache is relatively mild while present in the ED and states she has been using an inhaler and tylenol to manage her symptoms with no relief. Pt states she did not get the flu shot this year and denies chest pain, abdominal pain, nausea, vomiting, or diarrhea. Per nurse's note, she also reports to wheezing and bilateral leg pain that is worse in the right leg. No further complaints at this time.      The history is provided by the patient.     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    Asthma     Depression     Hypertension     Morbid obesity     Opioid dependence 2017    Pneumonia due to Streptococcus pneumoniae 2017    Sepsis due to Streptococcus pneumoniae 2017    Sickle cell-beta thalassemia disease with pain     Trigeminal neuralgia      Past Surgical History:   Procedure Laterality Date     SECTION       TONSILLECTOMY      TUBAL LIGATION       Family History   Problem Relation Age of Onset    Breast cancer Neg Hx     Ovarian cancer Neg Hx     Colon cancer Neg Hx      Social History     Tobacco Use    Smoking status: Never Smoker    Smokeless tobacco: Never Used   Substance Use Topics    Alcohol use: No    Drug use: No     Review of Systems   Constitutional: Positive for fever.   Respiratory: Positive for cough, shortness of breath and wheezing.    Cardiovascular: Negative for chest pain.   Gastrointestinal: Negative for abdominal pain, diarrhea, nausea and vomiting.   Musculoskeletal: Positive for myalgias.        Positive for bilateral leg pain, worse in the right leg.    Neurological: Positive for headaches.   All other systems reviewed and are negative.      Physical Exam     Initial Vitals [03/03/19 1122]   BP Pulse Resp Temp SpO2   (!) 177/102 110 18 (!) 100.6 °F (38.1 °C) 97 %      MAP       --         Physical Exam    Nursing note and vitals reviewed.  Constitutional: No distress.   HENT:   Head: Normocephalic and atraumatic.   Mouth/Throat: No oropharyngeal exudate.   Eyes: Conjunctivae and EOM are normal.   Neck: Normal range of motion. Neck supple.   Cardiovascular: Regular rhythm. Tachycardia present.    Tachycardia possibly due to inhaler use.   Pulmonary/Chest: She has wheezes (scattered expiratory wheezing but good air movement).   No retractions or stridor.   Abdominal: Soft. She exhibits no distension. There is no tenderness. There is no rebound and no guarding.   Musculoskeletal: Normal range of motion.   Neurological: She is alert and oriented to person, place, and time. She has normal strength. No sensory deficit.   Skin: No rash noted.   Psychiatric: She has a normal mood and affect. Her behavior is normal. Thought content normal.         ED Course   Procedures  Labs Reviewed - No data to display       Imaging Results    None          Medical Decision Making:   History:   Old Medical  Records: I decided to obtain old medical records.  Initial Assessment:   Sickle cell anemia patient presents with malaise cough low-grade fever and pain in various areas.  Differential Diagnosis:   Pneumonia, viral respiratory illness, bronchitis, influenza, influenza like illness  Clinical Tests:   Lab Tests: Ordered and Reviewed  Radiological Study: Reviewed and Ordered  ED Management:  Ultimately it appears that the patient likely has a lingular infiltrate which I believe is pneumonia although the radiologist calls a bronchitis.  Given that the patient has a history of sickle chest syndrome I would like to be conservative and treat her for early pneumonia with Levaquin.            Scribe Attestation:   Scribe #1: I performed the above scribed service and the documentation accurately describes the services I performed. I attest to the accuracy of the note.               Clinical Impression:       ICD-10-CM ICD-9-CM   1. Pneumonia of left lung due to infectious organism, unspecified part of lung J18.9 486         Disposition:   Disposition: Discharged  Condition: Stable                        Jesse Anthony MD  03/03/19 [id number]

## 2019-03-04 ENCOUNTER — TELEPHONE (OUTPATIENT)
Dept: HEMATOLOGY/ONCOLOGY | Facility: CLINIC | Age: 39
End: 2019-03-04

## 2019-03-04 DIAGNOSIS — R05.9 COUGH: Primary | ICD-10-CM

## 2019-03-04 RX ORDER — GUAIFENESIN/DEXTROMETHORPHAN 100-10MG/5
5 SYRUP ORAL EVERY 6 HOURS PRN
Qty: 236 ML | Refills: 0 | Status: SHIPPED | OUTPATIENT
Start: 2019-03-04 | End: 2019-03-06 | Stop reason: SDUPTHER

## 2019-03-04 NOTE — TELEPHONE ENCOUNTER
Patient called regarding cough. She had been seen yesterday in the emergency room and treated for a lung infiltrate (bronchitis vs. Pneumonia). She is requesting medication for the cough. Robitussin sent to her pharmacy     ----- Message from Snehal Dhaliwal RN sent at 3/4/2019  3:58 PM CST -----  Contact: Pt       ----- Message -----  From: Jenna Briggs  Sent: 3/4/2019   3:44 PM  To: Ulises BALTAZAR Staff    Pt called to speak with nurse about cough medication have some questions  Callback#[phone]  Thank You  Gertrude

## 2019-03-06 DIAGNOSIS — R05.9 COUGH: ICD-10-CM

## 2019-03-06 DIAGNOSIS — R11.0 NAUSEA: Primary | ICD-10-CM

## 2019-03-06 RX ORDER — PROMETHAZINE HYDROCHLORIDE 6.25 MG/5ML
25 SYRUP ORAL EVERY 6 HOURS PRN
Qty: 240 ML | Refills: 1 | Status: ON HOLD | OUTPATIENT
Start: 2019-03-06 | End: 2019-07-31

## 2019-03-06 NOTE — TELEPHONE ENCOUNTER
Spoke to patient and informed her that the RX was sent on the 4th.for cough.  She will pick today.

## 2019-03-07 RX ORDER — GUAIFENESIN/DEXTROMETHORPHAN 100-10MG/5
5 SYRUP ORAL EVERY 6 HOURS PRN
Qty: 236 ML | Refills: 0 | Status: SHIPPED | OUTPATIENT
Start: 2019-03-07 | End: 2019-03-17

## 2019-03-08 LAB
BACTERIA BLD CULT: NORMAL
BACTERIA BLD CULT: NORMAL

## 2019-03-15 DIAGNOSIS — I10 ESSENTIAL HYPERTENSION: ICD-10-CM

## 2019-03-15 DIAGNOSIS — G50.0 TRIGEMINAL NEURALGIA: ICD-10-CM

## 2019-03-15 DIAGNOSIS — D57.40 SICKLE CELL BETA THALASSEMIA: ICD-10-CM

## 2019-03-15 RX ORDER — OXYCODONE AND ACETAMINOPHEN 10; 325 MG/1; MG/1
1 TABLET ORAL EVERY 4 HOURS PRN
Qty: 120 TABLET | Refills: 0 | Status: SHIPPED | OUTPATIENT
Start: 2019-03-15 | End: 2019-04-15 | Stop reason: SDUPTHER

## 2019-03-15 RX ORDER — CARBAMAZEPINE 200 MG/1
800 TABLET ORAL 2 TIMES DAILY
Qty: 240 TABLET | Refills: 5 | Status: SHIPPED | OUTPATIENT
Start: 2019-03-15 | End: 2019-05-21 | Stop reason: SDUPTHER

## 2019-03-15 RX ORDER — AMLODIPINE BESYLATE 10 MG/1
10 TABLET ORAL DAILY
Qty: 30 TABLET | Refills: 2 | Status: SHIPPED | OUTPATIENT
Start: 2019-03-15 | End: 2019-05-08 | Stop reason: SDUPTHER

## 2019-03-30 DIAGNOSIS — G50.0 TRIGEMINAL NEURALGIA: ICD-10-CM

## 2019-03-30 DIAGNOSIS — F43.20 ADJUSTMENT DISORDER, UNSPECIFIED TYPE: ICD-10-CM

## 2019-03-30 DIAGNOSIS — F41.9 ANXIETY: ICD-10-CM

## 2019-03-30 RX ORDER — MIRTAZAPINE 45 MG/1
TABLET, ORALLY DISINTEGRATING ORAL
Qty: 30 TABLET | Refills: 9 | Status: SHIPPED | OUTPATIENT
Start: 2019-03-30 | End: 2019-04-25

## 2019-03-30 RX ORDER — GABAPENTIN 800 MG/1
800 TABLET ORAL 3 TIMES DAILY
Qty: 90 TABLET | Refills: 9 | Status: ON HOLD | OUTPATIENT
Start: 2019-03-30 | End: 2019-05-04 | Stop reason: SDUPTHER

## 2019-04-01 RX ORDER — FLUOXETINE HYDROCHLORIDE 40 MG/1
CAPSULE ORAL
Qty: 30 CAPSULE | Refills: 2 | Status: SHIPPED | OUTPATIENT
Start: 2019-04-01 | End: 2019-08-30 | Stop reason: SDUPTHER

## 2019-04-01 NOTE — TELEPHONE ENCOUNTER
Hi, please call patient -- to set up with new pcp in resident clinic Dr. Balta Forbes MD .  I will send in one prescription of this medicine, but patient needs to be seen for any further prescriptions.  Thank you, Rusty Mcfarlane

## 2019-04-10 NOTE — PROVIDER PROGRESS NOTES - EMERGENCY DEPT.
Encounter Date: 9/25/2017    ED Physician Progress Notes       SCRIBE NOTE: I, Sugar Arango, am scribing for, and in the presence of,  Dr. Hennessy.  Physician Statement: I, Dr. Hennessy, personally performed the services described in this documentation as scribed by Sugar Arango in my presence, and it is both accurate and complete.      EKG - STEMI Decision  Initial Reading: No STEMI present.      
Speaking Coherently

## 2019-04-15 DIAGNOSIS — M62.838 MUSCLE SPASM: ICD-10-CM

## 2019-04-15 DIAGNOSIS — D57.40 SICKLE CELL BETA THALASSEMIA: ICD-10-CM

## 2019-04-15 RX ORDER — CYCLOBENZAPRINE HCL 10 MG
10 TABLET ORAL 3 TIMES DAILY
Qty: 90 TABLET | Refills: 11 | Status: ON HOLD | OUTPATIENT
Start: 2019-04-15 | End: 2019-04-26

## 2019-04-15 RX ORDER — OXYCODONE AND ACETAMINOPHEN 10; 325 MG/1; MG/1
1 TABLET ORAL EVERY 4 HOURS PRN
Qty: 120 TABLET | Refills: 0 | Status: SHIPPED | OUTPATIENT
Start: 2019-04-15 | End: 2019-05-15 | Stop reason: SDUPTHER

## 2019-04-25 ENCOUNTER — HOSPITAL ENCOUNTER (INPATIENT)
Facility: HOSPITAL | Age: 39
LOS: 9 days | Discharge: HOME | DRG: 811 | End: 2019-05-04
Attending: EMERGENCY MEDICINE | Admitting: INTERNAL MEDICINE
Payer: MEDICAID

## 2019-04-25 DIAGNOSIS — J96.01 ACUTE RESPIRATORY FAILURE WITH HYPOXIA: ICD-10-CM

## 2019-04-25 DIAGNOSIS — R06.02 SOB (SHORTNESS OF BREATH): ICD-10-CM

## 2019-04-25 DIAGNOSIS — J45.909 ASTHMA, UNSPECIFIED ASTHMA SEVERITY, UNSPECIFIED WHETHER COMPLICATED, UNSPECIFIED WHETHER PERSISTENT: ICD-10-CM

## 2019-04-25 DIAGNOSIS — D57.00 SICKLE CELL CRISIS: Primary | ICD-10-CM

## 2019-04-25 DIAGNOSIS — R00.0 TACHYCARDIA: ICD-10-CM

## 2019-04-25 DIAGNOSIS — R07.9 CHEST PAIN: ICD-10-CM

## 2019-04-25 DIAGNOSIS — G50.0 TRIGEMINAL NEURALGIA: ICD-10-CM

## 2019-04-25 DIAGNOSIS — E87.5 HYPERKALEMIA: ICD-10-CM

## 2019-04-25 LAB
ALBUMIN SERPL BCP-MCNC: 4 G/DL (ref 3.5–5.2)
ALP SERPL-CCNC: 105 U/L (ref 55–135)
ALT SERPL W/O P-5'-P-CCNC: 10 U/L (ref 10–44)
ANION GAP SERPL CALC-SCNC: 11 MMOL/L (ref 8–16)
AST SERPL-CCNC: 17 U/L (ref 10–40)
BACTERIA #/AREA URNS AUTO: NORMAL /HPF
BASOPHILS # BLD AUTO: 0.04 K/UL (ref 0–0.2)
BASOPHILS NFR BLD: 0.5 % (ref 0–1.9)
BILIRUB SERPL-MCNC: 0.6 MG/DL (ref 0.1–1)
BILIRUB UR QL STRIP: NEGATIVE
BUN SERPL-MCNC: 6 MG/DL (ref 6–20)
CALCIUM SERPL-MCNC: 9.8 MG/DL (ref 8.7–10.5)
CHLORIDE SERPL-SCNC: 106 MMOL/L (ref 95–110)
CLARITY UR REFRACT.AUTO: CLEAR
CO2 SERPL-SCNC: 25 MMOL/L (ref 23–29)
COLOR UR AUTO: YELLOW
CREAT SERPL-MCNC: 0.8 MG/DL (ref 0.5–1.4)
DIFFERENTIAL METHOD: ABNORMAL
EOSINOPHIL # BLD AUTO: 0.1 K/UL (ref 0–0.5)
EOSINOPHIL NFR BLD: 1.5 % (ref 0–8)
ERYTHROCYTE [DISTWIDTH] IN BLOOD BY AUTOMATED COUNT: 17.8 % (ref 11.5–14.5)
EST. GFR  (AFRICAN AMERICAN): >60 ML/MIN/1.73 M^2
EST. GFR  (NON AFRICAN AMERICAN): >60 ML/MIN/1.73 M^2
GLUCOSE SERPL-MCNC: 105 MG/DL (ref 70–110)
GLUCOSE UR QL STRIP: NEGATIVE
HCT VFR BLD AUTO: 35.8 % (ref 37–48.5)
HGB BLD-MCNC: 11.8 G/DL (ref 12–16)
HGB UR QL STRIP: ABNORMAL
IMM GRANULOCYTES # BLD AUTO: 0.04 K/UL (ref 0–0.04)
IMM GRANULOCYTES NFR BLD AUTO: 0.5 % (ref 0–0.5)
INFLUENZA A, MOLECULAR: NEGATIVE
INFLUENZA B, MOLECULAR: NEGATIVE
KETONES UR QL STRIP: NEGATIVE
LACTATE SERPL-SCNC: 1.4 MMOL/L (ref 0.5–2.2)
LEUKOCYTE ESTERASE UR QL STRIP: NEGATIVE
LYMPHOCYTES # BLD AUTO: 1.2 K/UL (ref 1–4.8)
LYMPHOCYTES NFR BLD: 14.2 % (ref 18–48)
MCH RBC QN AUTO: 22.6 PG (ref 27–31)
MCHC RBC AUTO-ENTMCNC: 33 G/DL (ref 32–36)
MCV RBC AUTO: 69 FL (ref 82–98)
MICROSCOPIC COMMENT: NORMAL
MONOCYTES # BLD AUTO: 0.6 K/UL (ref 0.3–1)
MONOCYTES NFR BLD: 7.2 % (ref 4–15)
NEUTROPHILS # BLD AUTO: 6.3 K/UL (ref 1.8–7.7)
NEUTROPHILS NFR BLD: 76.1 % (ref 38–73)
NITRITE UR QL STRIP: NEGATIVE
NRBC BLD-RTO: 0 /100 WBC
PH UR STRIP: 8 [PH] (ref 5–8)
PLATELET # BLD AUTO: 223 K/UL (ref 150–350)
PMV BLD AUTO: 10 FL (ref 9.2–12.9)
POTASSIUM SERPL-SCNC: 3.4 MMOL/L (ref 3.5–5.1)
PROCALCITONIN SERPL IA-MCNC: 0.03 NG/ML
PROT SERPL-MCNC: 8.5 G/DL (ref 6–8.4)
PROT UR QL STRIP: NEGATIVE
RBC # BLD AUTO: 5.23 M/UL (ref 4–5.4)
RBC #/AREA URNS AUTO: 1 /HPF (ref 0–4)
RETICS/RBC NFR AUTO: 2.7 % (ref 0.5–2.5)
SODIUM SERPL-SCNC: 142 MMOL/L (ref 136–145)
SP GR UR STRIP: 1.01 (ref 1–1.03)
SPECIMEN SOURCE: NORMAL
SQUAMOUS #/AREA URNS AUTO: 2 /HPF
URN SPEC COLLECT METH UR: ABNORMAL
WBC # BLD AUTO: 8.25 K/UL (ref 3.9–12.7)
WBC #/AREA URNS AUTO: 2 /HPF (ref 0–5)

## 2019-04-25 PROCEDURE — 87502 INFLUENZA DNA AMP PROBE: CPT

## 2019-04-25 PROCEDURE — 96360 HYDRATION IV INFUSION INIT: CPT | Mod: 59

## 2019-04-25 PROCEDURE — 81001 URINALYSIS AUTO W/SCOPE: CPT

## 2019-04-25 PROCEDURE — 80053 COMPREHEN METABOLIC PANEL: CPT

## 2019-04-25 PROCEDURE — 25000003 PHARM REV CODE 250: Performed by: STUDENT IN AN ORGANIZED HEALTH CARE EDUCATION/TRAINING PROGRAM

## 2019-04-25 PROCEDURE — 63600175 PHARM REV CODE 636 W HCPCS: Performed by: STUDENT IN AN ORGANIZED HEALTH CARE EDUCATION/TRAINING PROGRAM

## 2019-04-25 PROCEDURE — 93005 ELECTROCARDIOGRAM TRACING: CPT

## 2019-04-25 PROCEDURE — 84145 PROCALCITONIN (PCT): CPT

## 2019-04-25 PROCEDURE — 83036 HEMOGLOBIN GLYCOSYLATED A1C: CPT

## 2019-04-25 PROCEDURE — 63600175 PHARM REV CODE 636 W HCPCS: Performed by: EMERGENCY MEDICINE

## 2019-04-25 PROCEDURE — 96376 TX/PRO/DX INJ SAME DRUG ADON: CPT

## 2019-04-25 PROCEDURE — 93010 ELECTROCARDIOGRAM REPORT: CPT | Mod: ,,, | Performed by: INTERNAL MEDICINE

## 2019-04-25 PROCEDURE — 12000002 HC ACUTE/MED SURGE SEMI-PRIVATE ROOM

## 2019-04-25 PROCEDURE — 96361 HYDRATE IV INFUSION ADD-ON: CPT | Mod: 59

## 2019-04-25 PROCEDURE — 96375 TX/PRO/DX INJ NEW DRUG ADDON: CPT

## 2019-04-25 PROCEDURE — 85045 AUTOMATED RETICULOCYTE COUNT: CPT

## 2019-04-25 PROCEDURE — 82962 GLUCOSE BLOOD TEST: CPT

## 2019-04-25 PROCEDURE — 87040 BLOOD CULTURE FOR BACTERIA: CPT | Mod: 59

## 2019-04-25 PROCEDURE — 83605 ASSAY OF LACTIC ACID: CPT

## 2019-04-25 PROCEDURE — 85025 COMPLETE CBC W/AUTO DIFF WBC: CPT

## 2019-04-25 PROCEDURE — 96374 THER/PROPH/DIAG INJ IV PUSH: CPT

## 2019-04-25 PROCEDURE — 99285 EMERGENCY DEPT VISIT HI MDM: CPT | Mod: 25

## 2019-04-25 PROCEDURE — 99285 PR EMERGENCY DEPT VISIT,LEVEL V: ICD-10-PCS | Mod: ,,, | Performed by: EMERGENCY MEDICINE

## 2019-04-25 PROCEDURE — 93010 EKG 12-LEAD: ICD-10-PCS | Mod: ,,, | Performed by: INTERNAL MEDICINE

## 2019-04-25 PROCEDURE — 99285 EMERGENCY DEPT VISIT HI MDM: CPT | Mod: ,,, | Performed by: EMERGENCY MEDICINE

## 2019-04-25 RX ORDER — CARBAMAZEPINE 200 MG/1
800 TABLET ORAL 2 TIMES DAILY
Status: DISCONTINUED | OUTPATIENT
Start: 2019-04-26 | End: 2019-05-04 | Stop reason: HOSPADM

## 2019-04-25 RX ORDER — DIPHENHYDRAMINE HYDROCHLORIDE 50 MG/ML
12.5 INJECTION INTRAMUSCULAR; INTRAVENOUS ONCE
Status: COMPLETED | OUTPATIENT
Start: 2019-04-25 | End: 2019-04-25

## 2019-04-25 RX ORDER — POLYETHYLENE GLYCOL 3350 17 G/17G
17 POWDER, FOR SOLUTION ORAL DAILY
Status: DISCONTINUED | OUTPATIENT
Start: 2019-04-26 | End: 2019-05-04 | Stop reason: HOSPADM

## 2019-04-25 RX ORDER — AZITHROMYCIN 250 MG/1
500 TABLET, FILM COATED ORAL
Status: COMPLETED | OUTPATIENT
Start: 2019-04-25 | End: 2019-04-25

## 2019-04-25 RX ORDER — GABAPENTIN 400 MG/1
800 CAPSULE ORAL 2 TIMES DAILY
Status: DISCONTINUED | OUTPATIENT
Start: 2019-04-26 | End: 2019-05-04 | Stop reason: HOSPADM

## 2019-04-25 RX ORDER — ALBUTEROL SULFATE 2.5 MG/.5ML
2.5 SOLUTION RESPIRATORY (INHALATION) EVERY 4 HOURS PRN
Status: DISCONTINUED | OUTPATIENT
Start: 2019-04-26 | End: 2019-05-04 | Stop reason: HOSPADM

## 2019-04-25 RX ORDER — ACETAMINOPHEN 325 MG/1
650 TABLET ORAL
Status: COMPLETED | OUTPATIENT
Start: 2019-04-25 | End: 2019-04-25

## 2019-04-25 RX ORDER — ALBUTEROL SULFATE 90 UG/1
2 AEROSOL, METERED RESPIRATORY (INHALATION) EVERY 4 HOURS PRN
Status: DISCONTINUED | OUTPATIENT
Start: 2019-04-26 | End: 2019-04-25

## 2019-04-25 RX ORDER — HYDROMORPHONE HYDROCHLORIDE 1 MG/ML
1 INJECTION, SOLUTION INTRAMUSCULAR; INTRAVENOUS; SUBCUTANEOUS ONCE
Status: COMPLETED | OUTPATIENT
Start: 2019-04-25 | End: 2019-04-25

## 2019-04-25 RX ORDER — ACETAMINOPHEN 325 MG/1
325 TABLET ORAL
Status: COMPLETED | OUTPATIENT
Start: 2019-04-25 | End: 2019-04-25

## 2019-04-25 RX ORDER — SODIUM CHLORIDE 450 MG/100ML
1000 INJECTION, SOLUTION INTRAVENOUS
Status: COMPLETED | OUTPATIENT
Start: 2019-04-25 | End: 2019-04-26

## 2019-04-25 RX ORDER — HYDROMORPHONE HYDROCHLORIDE 1 MG/ML
1 INJECTION, SOLUTION INTRAMUSCULAR; INTRAVENOUS; SUBCUTANEOUS EVERY 6 HOURS PRN
Status: DISCONTINUED | OUTPATIENT
Start: 2019-04-25 | End: 2019-04-26

## 2019-04-25 RX ORDER — AMOXICILLIN 250 MG
1 CAPSULE ORAL 2 TIMES DAILY
Status: DISCONTINUED | OUTPATIENT
Start: 2019-04-26 | End: 2019-05-04 | Stop reason: HOSPADM

## 2019-04-25 RX ORDER — FLUOXETINE HYDROCHLORIDE 20 MG/1
40 CAPSULE ORAL DAILY
Status: DISCONTINUED | OUTPATIENT
Start: 2019-04-26 | End: 2019-05-04 | Stop reason: HOSPADM

## 2019-04-25 RX ORDER — HYDROMORPHONE HYDROCHLORIDE 1 MG/ML
1 INJECTION, SOLUTION INTRAMUSCULAR; INTRAVENOUS; SUBCUTANEOUS
Status: COMPLETED | OUTPATIENT
Start: 2019-04-25 | End: 2019-04-25

## 2019-04-25 RX ORDER — CEFTRIAXONE 1 G/1
1 INJECTION, POWDER, FOR SOLUTION INTRAMUSCULAR; INTRAVENOUS
Status: COMPLETED | OUTPATIENT
Start: 2019-04-25 | End: 2019-04-25

## 2019-04-25 RX ADMIN — HYDROMORPHONE HYDROCHLORIDE 1 MG: 1 INJECTION, SOLUTION INTRAMUSCULAR; INTRAVENOUS; SUBCUTANEOUS at 07:04

## 2019-04-25 RX ADMIN — ACETAMINOPHEN 650 MG: 325 TABLET ORAL at 07:04

## 2019-04-25 RX ADMIN — SODIUM CHLORIDE 1000 ML: 0.45 INJECTION, SOLUTION INTRAVENOUS at 07:04

## 2019-04-25 RX ADMIN — SODIUM CHLORIDE 1000 ML: 0.9 INJECTION, SOLUTION INTRAVENOUS at 08:04

## 2019-04-25 RX ADMIN — DIPHENHYDRAMINE HYDROCHLORIDE 12.5 MG: 50 INJECTION INTRAMUSCULAR; INTRAVENOUS at 07:04

## 2019-04-25 RX ADMIN — CEFTRIAXONE SODIUM 1 G: 1 INJECTION, POWDER, FOR SOLUTION INTRAMUSCULAR; INTRAVENOUS at 10:04

## 2019-04-25 RX ADMIN — ACETAMINOPHEN 325 MG: 325 TABLET ORAL at 06:04

## 2019-04-25 RX ADMIN — HYDROMORPHONE HYDROCHLORIDE 1 MG: 1 INJECTION, SOLUTION INTRAMUSCULAR; INTRAVENOUS; SUBCUTANEOUS at 08:04

## 2019-04-25 RX ADMIN — AZITHROMYCIN 500 MG: 250 TABLET, FILM COATED ORAL at 10:04

## 2019-04-25 NOTE — ED PROVIDER NOTES
"Encounter Date: 2019       History     Chief Complaint   Patient presents with    Sickle Cell Pain Crisis     c/o pain to legs, hips and arms. Pain woke her at 11 am, took a percocet, didnt help 'only made it worse."      Nazanin Malone is a 39 year old lady who presented to the [facility] ED on 2019 w/ primary complaint of severe pain in the extremities.  MHx includes HTN, obesity, sickle cell, beta thalassemia.  She woke up with a headache this AM and took a prescribed percocet.  She then woke up again at 1100 w/ a subjective fever, for which she took 2x tylenol and went back to sleep.  At 1500 she woke up w/ severe pain in her arms, fingers, legs, toes, and shortness of breath.  She describes the pain as 10/10 in severity.  Her last sickle cell crisis occurred in early 2019, when she presented to the ED and was diagnosed w/ pneumonia, discharged on PO antibiotics.  She is currently reporting sinus symptoms and productive cough (clear mucus), as well as dizziness and chest tightness.        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    Asthma     Depression     Hypertension     Morbid obesity     Opioid dependence 2017    Pneumonia due to Streptococcus pneumoniae 2017    Sepsis due to Streptococcus pneumoniae 2017    Sickle cell-beta thalassemia disease with pain     Trigeminal neuralgia      Past Surgical History:   Procedure Laterality Date     SECTION      TONSILLECTOMY      TUBAL LIGATION       Family History   Problem Relation Age of Onset    Heart disease Mother     Diabetes Mother     Heart disease Father     Breast cancer Neg Hx     Ovarian cancer Neg Hx     Colon cancer Neg Hx      Social History     Tobacco Use    Smoking status: Never Smoker    Smokeless tobacco: Never Used "   Substance Use Topics    Alcohol use: No    Drug use: No     Review of Systems   Constitutional: Positive for chills and fever. Negative for activity change, appetite change, diaphoresis and fatigue.   HENT: Negative for congestion, sore throat and trouble swallowing.    Eyes: Negative for photophobia and visual disturbance.   Respiratory: Positive for cough and shortness of breath. Negative for wheezing and stridor.    Cardiovascular: Positive for chest pain (MSK pain) and palpitations. Negative for leg swelling.   Gastrointestinal: Positive for nausea. Negative for abdominal distention, abdominal pain, constipation, diarrhea and vomiting.   Genitourinary: Negative for dysuria and hematuria.   Musculoskeletal: Positive for myalgias.   Skin: Negative for pallor and rash.   Neurological: Positive for dizziness and headaches. Negative for tremors and weakness.   Psychiatric/Behavioral: Negative for agitation and confusion. The patient is not nervous/anxious.        Physical Exam     Initial Vitals [04/25/19 1648]   BP Pulse Resp Temp SpO2   (!) 148/96 (!) 132 20 (!) 102.5 °F (39.2 °C) 95 %      MAP       --         Physical Exam    Constitutional: She appears well-developed and well-nourished. She is not diaphoretic. She appears distressed.   HENT:   Head: Normocephalic and atraumatic.   Right Ear: External ear normal.   Left Ear: External ear normal.   Eyes: Conjunctivae and EOM are normal. No scleral icterus.   Neck: Normal range of motion. Neck supple.   Cardiovascular: Regular rhythm and normal heart sounds. Tachycardia present.    No murmur heard.  Pulmonary/Chest: Breath sounds normal. No respiratory distress. She has no wheezes. She has no rales.   Abdominal: Soft. Bowel sounds are normal. There is tenderness (Diffuse, worse in RLQ). There is no rebound.   Musculoskeletal: Normal range of motion. She exhibits no tenderness.   Neurological: She is alert and oriented to person, place, and time. No cranial  nerve deficit.   Skin: Skin is warm and dry. No rash noted. No erythema.   Psychiatric: She has a normal mood and affect. Thought content normal.         ED Course   Procedures  Labs Reviewed   CBC W/ AUTO DIFFERENTIAL - Abnormal; Notable for the following components:       Result Value    Hemoglobin 11.8 (*)     Hematocrit 35.8 (*)     MCV 69 (*)     MCH 22.6 (*)     RDW 17.8 (*)     Gran% 76.1 (*)     Lymph% 14.2 (*)     All other components within normal limits   COMPREHENSIVE METABOLIC PANEL - Abnormal; Notable for the following components:    Potassium 3.4 (*)     Total Protein 8.5 (*)     All other components within normal limits   URINALYSIS, REFLEX TO URINE CULTURE - Abnormal; Notable for the following components:    Occult Blood UA 3+ (*)     All other components within normal limits    Narrative:     yellow an gray  Preferred Collection Type->Urine, Clean Catch   RETICULOCYTES - Abnormal; Notable for the following components:    Retic 2.7 (*)     All other components within normal limits   COMPREHENSIVE METABOLIC PANEL - Abnormal; Notable for the following components:    Potassium 3.3 (*)     Glucose 116 (*)     ALT 9 (*)     All other components within normal limits   CBC W/ AUTO DIFFERENTIAL - Abnormal; Notable for the following components:    Hemoglobin 10.4 (*)     Hematocrit 32.5 (*)     MCV 70 (*)     MCH 22.4 (*)     RDW 17.3 (*)     MPV 9.0 (*)     Gran% 73.5 (*)     Lymph% 14.7 (*)     All other components within normal limits   POCT GLUCOSE, HAND-HELD DEVICE - Abnormal; Notable for the following components:    POC Glucose 111 (*)     All other components within normal limits   POCT GLUCOSE - Abnormal; Notable for the following components:    POCT Glucose 111 (*)     All other components within normal limits   CULTURE, BLOOD   CULTURE, BLOOD   INFLUENZA A & B BY MOLECULAR   RESPIRATORY VIRAL PANEL BY PCR   LACTIC ACID, PLASMA   PROCALCITONIN   URINALYSIS MICROSCOPIC    Narrative:     yellow an  gray  Preferred Collection Type->Urine, Clean Catch   HEMOGLOBIN A1C   HEMOGLOBIN A1C    Narrative:     ADD ON [facility] # [id number] PER DR.VICTOR DOMINIC ALSTON @  04/26/2019    01:18    IMMUNOGLOBULINS (IGG, IGA, IGM) QUANTITATIVE   IMMUNOGLOBULINS (IGG, IGA, IGM) QUANTITATIVE    Narrative:     ADD ON IGGSQ PER: VIVIAN CLEARY MD  04/26/2019  05:17    TROPONIN I   TROPONIN I    Narrative:     ADD ON IGGSQ PER: VIVIAN CLEARY MD  04/26/2019  05:17   add on TROP [id number]  VIVIAN CLEARY MD  04/26/2019         ECG Results          EKG 12-lead (Final result)  Result time 04/26/19 11:21:46    Final result by Interface, Lab In [facility] (04/26/19 11:21:46)                 Narrative:    Test Reason : R00.0,    Vent. Rate : 127 BPM     Atrial Rate : 127 BPM     P-R Int : 104 ms          QRS Dur : 088 ms      QT Int : 410 ms       P-R-T Axes : 000 048 062 degrees     QTc Int : 595 ms    Sinus tachycardia  Nonspecific ST and/or T wave abnormalities  Prolonged QT  Abnormal ECG  When compared with ECG of 05-MAY-2018 00:22,  No significant change was found  Confirmed by Michael Morrison MD ([id number]) on 4/26/2019 11:21:42 AM    Referred By: [email or web address]   SELF           Confirmed By:Michael Morrison MD                            Imaging Results          X-Ray Chest PA And Lateral (Final result)  Result time 04/26/19 06:40:42    Final result by Rob Colvin MD (04/26/19 06:40:42)                 Narrative:    EXAMINATION:  XR CHEST PA AND LATERAL    CLINICAL HISTORY:  Acute chest;    TECHNIQUE:  PA and lateral views of the chest were performed.    COMPARISON:  Chest radiograph 04/25/2019, CT chest 03/03/2019    FINDINGS:  Cardiac monitoring leads overlie the chest.  Cardiomediastinal silhouette appears stable from prior exam.  There are coarse bilateral interstitial markings, similar to prior examination.  No new large confluent airspace consolidation.  There is stable subsegmental atelectasis or scarring within the left  lower lung zone.  No evidence of large pleural effusion or pneumothorax.  The visualized osseous structures appear intact.      As above.      Electronically signed by: Rob Colvin MD  Date:    04/26/2019  Time:    06:40                             X-Ray Chest PA And Lateral (Final result)  Result time 04/25/19 18:27:21    Final result by Nelson Mills MD (04/25/19 18:27:21)                 Impression:      No acute abnormality.      Electronically signed by: Nelson Mills MD  Date:    04/25/2019  Time:    18:27             Narrative:    EXAMINATION:  XR CHEST PA AND LATERAL    CLINICAL HISTORY:  Shortness of breath    TECHNIQUE:  PA and lateral views of the chest were performed.    COMPARISON:  None    FINDINGS:  The lungs are clear, with normal appearance of pulmonary vasculature and no pleural effusion or pneumothorax.    The cardiac silhouette is normal in size. The hilar and mediastinal contours are unremarkable.    Bones are intact.                                 Medical Decision Making:   History:   Old Medical Records: I decided to obtain old medical records.  Old Records Summarized: records from previous admission(s).       <> Summary of Records: 39F w. MHx HTN, obesity, sickle cell, beta thalassemia.  Last presented to ED in early March 2019 for sickle cell crisis, diagnosed w/ pneumonia and discharged w/ PO antibiotics.  Initial Assessment:   39 year old lady w/ MHx of HTN, asthma, obesity, sickle cell, beta thalassemia presents w/ diffuse MSK pain and SOB, differential includes but not limited to sickle cell crisis, pneumonia given recent CAP diagnosis, asthma exacerbation.  Not currently requiring supplemental O2.  She has a fever of 102.5F.  Initiating workup w/ basic labs, CXR, UA, procalcitonin, reticulocytes, lactic acid, blood cultures.  Will also treat her acute pain w/ IV dilaudid and benadryl given her prior history of itching w/ opioids.         APC / Resident Notes:   6:49 PM CXR  revealing no acute abnormalities.  Given 1mg IV dilaudid for pain.  8:17 PM Additional 1mg IV dilaudid given for continued pain.  10:04 PM Urinalysis not suggestive of UTI.  Discussed case w/ hospital medicine, will admit to inpatient in setting of sickle cell crisis and fever of unknown origin.  Given 1x dose of ceftriaxone and azithromycin for concern of pneumonia.           Attending Attestation:   Physician Attestation Statement for Resident:  As the supervising MD  I agree with the above history. -:   As the supervising MD I agree with the above treatment, course, plan, and disposition.  I have reviewed and agree with the residents interpretation of the following: lab data, x-rays and EKG.  I have reviewed the following: old records at this facility.                       Clinical Impression:       ICD-10-CM ICD-9-CM   1. Sickle cell crisis D57.00 282.62   2. Tachycardia R00.0 785.0   3. SOB (shortness of breath) R06.02 786.05         Disposition:   Disposition: Admitted  Condition: Serious                        Ralph Dockery MD  Resident  04/25/19 2206       Neel Rahman MD  04/26/19 2012

## 2019-04-25 NOTE — ED NOTES
Pt identifiers Trushanda Encalade were checked and are correct  LOC: The patient is awake, alert, aware of environment with an appropriate affect. Oriented x4, speaking appropriately  APPEARANCE: Pt rates over body aches a 10/10 , in no acute distress, pt is clean and well groomed, clothing properly fastened  SKIN: Skin warm, dry and intact, normal skin turgor, moist mucus membranes  RESPIRATORY: Airway is open and patent, respirations are spontaneous, even and unlabored, normal effort and rate Breath sounds clear marshall to all lung fields on auscultation  CARDIAC: Normal rate and rhythm, 1 + peripheral edema noted, capillary refill < 3 seconds, bilateral radial pulses 2+  ABDOMEN: Soft, nontender, nondistended. Bowel sounds present to all four quad of abd on auscultation  NEUROLOGIC: PERRL, facial expression is symmetrical, patient moving all extremities spontaneously, normal sensation in all extremities when touched with a finger.  Follows all commands appropriately  MUSCULOSKELETAL: No obvious deformities.

## 2019-04-25 NOTE — LETTER
May 4, 2019    Nazanin Malone  [address] [facility] [address]                [address] [facility] [address] [facility] [address]  Phone: [phone]  Fax: [phone] Dear Whom it may concern about Ms. Malone:    Please excuse Ms. Nazanin Malone as she was hospitalized requiring treatment from 04/25/2019 to 05/04/2019. She may resume work  5/8/2019.     If you have any questions or concerns, please don't hesitate to call.    Sincerely,      Ashley Oconnor MD

## 2019-04-26 PROBLEM — A40.3 SEPSIS DUE TO STREPTOCOCCUS PNEUMONIAE: Status: RESOLVED | Noted: 2017-04-25 | Resolved: 2019-04-26

## 2019-04-26 PROBLEM — D57.01 ACUTE CHEST SYNDROME DUE TO HEMOGLOBIN S DISEASE: Status: RESOLVED | Noted: 2017-04-29 | Resolved: 2019-04-26

## 2019-04-26 PROBLEM — D57.431: Status: ACTIVE | Noted: 2019-04-26

## 2019-04-26 PROBLEM — J13 PNEUMONIA DUE TO STREPTOCOCCUS PNEUMONIAE: Status: RESOLVED | Noted: 2017-04-25 | Resolved: 2019-04-26

## 2019-04-26 PROBLEM — D57.419: Status: ACTIVE | Noted: 2019-04-26

## 2019-04-26 PROBLEM — J96.01 ACUTE RESPIRATORY FAILURE WITH HYPOXIA: Status: RESOLVED | Noted: 2017-04-25 | Resolved: 2019-04-26

## 2019-04-26 PROBLEM — J45.21 MILD INTERMITTENT ASTHMA WITH ACUTE EXACERBATION: Status: ACTIVE | Noted: 2019-04-26

## 2019-04-26 PROBLEM — J18.9 MULTIFOCAL PNEUMONIA: Status: RESOLVED | Noted: 2017-04-29 | Resolved: 2019-04-26

## 2019-04-26 PROBLEM — A41.50 SEPSIS DUE TO GRAM NEGATIVE BACTERIA: Status: RESOLVED | Noted: 2017-04-23 | Resolved: 2019-04-26

## 2019-04-26 PROBLEM — J40 BRONCHITIS: Status: RESOLVED | Noted: 2017-04-16 | Resolved: 2019-04-26

## 2019-04-26 LAB
ALBUMIN SERPL BCP-MCNC: 3.5 G/DL (ref 3.5–5.2)
ALP SERPL-CCNC: 86 U/L (ref 55–135)
ALT SERPL W/O P-5'-P-CCNC: 9 U/L (ref 10–44)
ANION GAP SERPL CALC-SCNC: 8 MMOL/L (ref 8–16)
AST SERPL-CCNC: 18 U/L (ref 10–40)
BASOPHILS # BLD AUTO: 0.02 K/UL (ref 0–0.2)
BASOPHILS NFR BLD: 0.3 % (ref 0–1.9)
BILIRUB SERPL-MCNC: 0.5 MG/DL (ref 0.1–1)
BUN SERPL-MCNC: 6 MG/DL (ref 6–20)
CALCIUM SERPL-MCNC: 8.8 MG/DL (ref 8.7–10.5)
CHLORIDE SERPL-SCNC: 105 MMOL/L (ref 95–110)
CO2 SERPL-SCNC: 24 MMOL/L (ref 23–29)
CREAT SERPL-MCNC: 0.8 MG/DL (ref 0.5–1.4)
DIFFERENTIAL METHOD: ABNORMAL
EOSINOPHIL # BLD AUTO: 0.1 K/UL (ref 0–0.5)
EOSINOPHIL NFR BLD: 1.1 % (ref 0–8)
ERYTHROCYTE [DISTWIDTH] IN BLOOD BY AUTOMATED COUNT: 17.3 % (ref 11.5–14.5)
EST. GFR  (AFRICAN AMERICAN): >60 ML/MIN/1.73 M^2
EST. GFR  (NON AFRICAN AMERICAN): >60 ML/MIN/1.73 M^2
ESTIMATED AVG GLUCOSE: 108 MG/DL (ref 68–131)
GLUCOSE SERPL-MCNC: 111 MG/DL (ref 70–110)
GLUCOSE SERPL-MCNC: 116 MG/DL (ref 70–110)
HBA1C MFR BLD HPLC: 5.4 % (ref 4–5.6)
HCT VFR BLD AUTO: 32.5 % (ref 37–48.5)
HGB BLD-MCNC: 10.4 G/DL (ref 12–16)
IGA SERPL-MCNC: 315 MG/DL (ref 40–350)
IGG SERPL-MCNC: 1320 MG/DL (ref 650–1600)
IGM SERPL-MCNC: 170 MG/DL (ref 50–300)
IMM GRANULOCYTES # BLD AUTO: 0.02 K/UL (ref 0–0.04)
IMM GRANULOCYTES NFR BLD AUTO: 0.3 % (ref 0–0.5)
LYMPHOCYTES # BLD AUTO: 1.2 K/UL (ref 1–4.8)
LYMPHOCYTES NFR BLD: 14.7 % (ref 18–48)
MCH RBC QN AUTO: 22.4 PG (ref 27–31)
MCHC RBC AUTO-ENTMCNC: 32 G/DL (ref 32–36)
MCV RBC AUTO: 70 FL (ref 82–98)
MONOCYTES # BLD AUTO: 0.8 K/UL (ref 0.3–1)
MONOCYTES NFR BLD: 10.1 % (ref 4–15)
NEUTROPHILS # BLD AUTO: 5.8 K/UL (ref 1.8–7.7)
NEUTROPHILS NFR BLD: 73.5 % (ref 38–73)
NRBC BLD-RTO: 0 /100 WBC
PLATELET # BLD AUTO: 189 K/UL (ref 150–350)
PMV BLD AUTO: 9 FL (ref 9.2–12.9)
POCT GLUCOSE: 111 MG/DL (ref 70–110)
POTASSIUM SERPL-SCNC: 3.3 MMOL/L (ref 3.5–5.1)
PROT SERPL-MCNC: 7.3 G/DL (ref 6–8.4)
RBC # BLD AUTO: 4.64 M/UL (ref 4–5.4)
SODIUM SERPL-SCNC: 137 MMOL/L (ref 136–145)
TROPONIN I SERPL DL<=0.01 NG/ML-MCNC: 0.01 NG/ML (ref 0–0.03)
WBC # BLD AUTO: 7.9 K/UL (ref 3.9–12.7)

## 2019-04-26 PROCEDURE — 99900035 HC TECH TIME PER 15 MIN (STAT)

## 2019-04-26 PROCEDURE — 96361 HYDRATE IV INFUSION ADD-ON: CPT

## 2019-04-26 PROCEDURE — 94799 UNLISTED PULMONARY SVC/PX: CPT

## 2019-04-26 PROCEDURE — 25000003 PHARM REV CODE 250: Performed by: STUDENT IN AN ORGANIZED HEALTH CARE EDUCATION/TRAINING PROGRAM

## 2019-04-26 PROCEDURE — 27000646 HC AEROBIKA DEVICE

## 2019-04-26 PROCEDURE — 84484 ASSAY OF TROPONIN QUANT: CPT

## 2019-04-26 PROCEDURE — 85025 COMPLETE CBC W/AUTO DIFF WBC: CPT

## 2019-04-26 PROCEDURE — 63600175 PHARM REV CODE 636 W HCPCS: Performed by: STUDENT IN AN ORGANIZED HEALTH CARE EDUCATION/TRAINING PROGRAM

## 2019-04-26 PROCEDURE — S5010 5% DEXTROSE AND 0.45% SALINE: HCPCS | Performed by: STUDENT IN AN ORGANIZED HEALTH CARE EDUCATION/TRAINING PROGRAM

## 2019-04-26 PROCEDURE — 25000242 PHARM REV CODE 250 ALT 637 W/ HCPCS: Performed by: STUDENT IN AN ORGANIZED HEALTH CARE EDUCATION/TRAINING PROGRAM

## 2019-04-26 PROCEDURE — 96372 THER/PROPH/DIAG INJ SC/IM: CPT | Mod: 59

## 2019-04-26 PROCEDURE — 11000001 HC ACUTE MED/SURG PRIVATE ROOM

## 2019-04-26 PROCEDURE — 83020 HEMOGLOBIN ELECTROPHORESIS: CPT

## 2019-04-26 PROCEDURE — 27000221 HC OXYGEN, UP TO 24 HOURS

## 2019-04-26 PROCEDURE — 94640 AIRWAY INHALATION TREATMENT: CPT

## 2019-04-26 PROCEDURE — 82784 ASSAY IGA/IGD/IGG/IGM EACH: CPT | Mod: 59

## 2019-04-26 PROCEDURE — 99232 SBSQ HOSP IP/OBS MODERATE 35: CPT | Mod: ,,, | Performed by: INTERNAL MEDICINE

## 2019-04-26 PROCEDURE — 99223 PR INITIAL HOSPITAL CARE,LEVL III: ICD-10-PCS | Mod: ,,, | Performed by: HOSPITALIST

## 2019-04-26 PROCEDURE — 36415 COLL VENOUS BLD VENIPUNCTURE: CPT

## 2019-04-26 PROCEDURE — 94761 N-INVAS EAR/PLS OXIMETRY MLT: CPT

## 2019-04-26 PROCEDURE — 99223 1ST HOSP IP/OBS HIGH 75: CPT | Mod: ,,, | Performed by: HOSPITALIST

## 2019-04-26 PROCEDURE — 99232 PR SUBSEQUENT HOSPITAL CARE,LEVL II: ICD-10-PCS | Mod: ,,, | Performed by: INTERNAL MEDICINE

## 2019-04-26 PROCEDURE — 94664 DEMO&/EVAL PT USE INHALER: CPT

## 2019-04-26 PROCEDURE — 80053 COMPREHEN METABOLIC PANEL: CPT

## 2019-04-26 RX ORDER — ENOXAPARIN SODIUM 100 MG/ML
40 INJECTION SUBCUTANEOUS EVERY 12 HOURS
Status: DISCONTINUED | OUTPATIENT
Start: 2019-04-26 | End: 2019-05-04 | Stop reason: HOSPADM

## 2019-04-26 RX ORDER — HYDROMORPHONE HYDROCHLORIDE 1 MG/ML
1 INJECTION, SOLUTION INTRAMUSCULAR; INTRAVENOUS; SUBCUTANEOUS EVERY 4 HOURS
Status: DISCONTINUED | OUTPATIENT
Start: 2019-04-26 | End: 2019-04-26

## 2019-04-26 RX ORDER — IPRATROPIUM BROMIDE AND ALBUTEROL SULFATE 2.5; .5 MG/3ML; MG/3ML
3 SOLUTION RESPIRATORY (INHALATION)
Status: DISCONTINUED | OUTPATIENT
Start: 2019-04-26 | End: 2019-05-04 | Stop reason: HOSPADM

## 2019-04-26 RX ORDER — PREDNISONE 20 MG/1
40 TABLET ORAL DAILY
Status: COMPLETED | OUTPATIENT
Start: 2019-04-26 | End: 2019-04-30

## 2019-04-26 RX ORDER — DIPHENHYDRAMINE HYDROCHLORIDE 50 MG/ML
12.5 INJECTION INTRAMUSCULAR; INTRAVENOUS EVERY 4 HOURS PRN
Status: DISCONTINUED | OUTPATIENT
Start: 2019-04-26 | End: 2019-04-27

## 2019-04-26 RX ORDER — HYDROMORPHONE HYDROCHLORIDE 1 MG/ML
0.5 INJECTION, SOLUTION INTRAMUSCULAR; INTRAVENOUS; SUBCUTANEOUS EVERY 8 HOURS PRN
Status: DISCONTINUED | OUTPATIENT
Start: 2019-04-26 | End: 2019-04-26

## 2019-04-26 RX ORDER — ACETAMINOPHEN 325 MG/1
650 TABLET ORAL EVERY 8 HOURS PRN
Status: DISCONTINUED | OUTPATIENT
Start: 2019-04-26 | End: 2019-04-26

## 2019-04-26 RX ORDER — SODIUM CHLORIDE 450 MG/100ML
1000 INJECTION, SOLUTION INTRAVENOUS CONTINUOUS
Status: ACTIVE | OUTPATIENT
Start: 2019-04-26 | End: 2019-04-26

## 2019-04-26 RX ORDER — AZITHROMYCIN 250 MG/1
500 TABLET, FILM COATED ORAL DAILY
Status: COMPLETED | OUTPATIENT
Start: 2019-04-26 | End: 2019-04-29

## 2019-04-26 RX ORDER — DEXTROSE MONOHYDRATE AND SODIUM CHLORIDE 5; .45 G/100ML; G/100ML
INJECTION, SOLUTION INTRAVENOUS CONTINUOUS
Status: DISCONTINUED | OUTPATIENT
Start: 2019-04-26 | End: 2019-04-26

## 2019-04-26 RX ORDER — SODIUM CHLORIDE 0.9 % (FLUSH) 0.9 %
10 SYRINGE (ML) INJECTION
Status: DISCONTINUED | OUTPATIENT
Start: 2019-04-26 | End: 2019-05-04 | Stop reason: HOSPADM

## 2019-04-26 RX ORDER — GLUCAGON 1 MG
1 KIT INJECTION
Status: DISCONTINUED | OUTPATIENT
Start: 2019-04-26 | End: 2019-05-04 | Stop reason: HOSPADM

## 2019-04-26 RX ORDER — IBUPROFEN 200 MG
16 TABLET ORAL
Status: DISCONTINUED | OUTPATIENT
Start: 2019-04-26 | End: 2019-05-04 | Stop reason: HOSPADM

## 2019-04-26 RX ORDER — HYDROMORPHONE HYDROCHLORIDE 1 MG/ML
1 INJECTION, SOLUTION INTRAMUSCULAR; INTRAVENOUS; SUBCUTANEOUS EVERY 6 HOURS PRN
Status: DISCONTINUED | OUTPATIENT
Start: 2019-04-26 | End: 2019-04-26

## 2019-04-26 RX ORDER — ASCORBIC ACID 500 MG
500 TABLET ORAL DAILY
Status: ON HOLD | COMMUNITY
End: 2020-01-11 | Stop reason: CLARIF

## 2019-04-26 RX ORDER — KETOROLAC TROMETHAMINE 30 MG/ML
30 INJECTION, SOLUTION INTRAMUSCULAR; INTRAVENOUS EVERY 6 HOURS
Status: DISCONTINUED | OUTPATIENT
Start: 2019-04-26 | End: 2019-04-26

## 2019-04-26 RX ORDER — AMLODIPINE BESYLATE 10 MG/1
10 TABLET ORAL DAILY
Status: DISCONTINUED | OUTPATIENT
Start: 2019-04-26 | End: 2019-05-04 | Stop reason: HOSPADM

## 2019-04-26 RX ORDER — CALCIUM CARBONATE 200(500)MG
1 TABLET,CHEWABLE ORAL DAILY PRN
Status: ON HOLD | COMMUNITY
End: 2020-01-11 | Stop reason: CLARIF

## 2019-04-26 RX ORDER — HYDROMORPHONE HYDROCHLORIDE 1 MG/ML
1 INJECTION, SOLUTION INTRAMUSCULAR; INTRAVENOUS; SUBCUTANEOUS EVERY 4 HOURS PRN
Status: DISCONTINUED | OUTPATIENT
Start: 2019-04-26 | End: 2019-05-04

## 2019-04-26 RX ORDER — OXYCODONE HYDROCHLORIDE 10 MG/1
10 TABLET ORAL EVERY 6 HOURS PRN
Status: DISCONTINUED | OUTPATIENT
Start: 2019-04-26 | End: 2019-04-29

## 2019-04-26 RX ORDER — HYDROMORPHONE HYDROCHLORIDE 1 MG/ML
1 INJECTION, SOLUTION INTRAMUSCULAR; INTRAVENOUS; SUBCUTANEOUS
Status: DISCONTINUED | OUTPATIENT
Start: 2019-04-26 | End: 2019-04-26

## 2019-04-26 RX ORDER — POTASSIUM CHLORIDE 20 MEQ/1
40 TABLET, EXTENDED RELEASE ORAL
Status: DISPENSED | OUTPATIENT
Start: 2019-04-26 | End: 2019-04-26

## 2019-04-26 RX ORDER — ACETAMINOPHEN 500 MG
1000 TABLET ORAL EVERY 6 HOURS
Status: DISCONTINUED | OUTPATIENT
Start: 2019-04-26 | End: 2019-04-29

## 2019-04-26 RX ORDER — IPRATROPIUM BROMIDE AND ALBUTEROL SULFATE 2.5; .5 MG/3ML; MG/3ML
3 SOLUTION RESPIRATORY (INHALATION) EVERY 6 HOURS PRN
Status: DISCONTINUED | OUTPATIENT
Start: 2019-04-26 | End: 2019-04-26

## 2019-04-26 RX ORDER — ACETAMINOPHEN 500 MG
1000 TABLET ORAL DAILY PRN
Status: ON HOLD | COMMUNITY
End: 2020-01-10

## 2019-04-26 RX ORDER — NALOXONE HCL 0.4 MG/ML
0.4 VIAL (ML) INJECTION
Status: DISCONTINUED | OUTPATIENT
Start: 2019-04-26 | End: 2019-05-04 | Stop reason: HOSPADM

## 2019-04-26 RX ORDER — IBUPROFEN 200 MG
24 TABLET ORAL
Status: DISCONTINUED | OUTPATIENT
Start: 2019-04-26 | End: 2019-05-04 | Stop reason: HOSPADM

## 2019-04-26 RX ADMIN — STANDARDIZED SENNA CONCENTRATE AND DOCUSATE SODIUM 1 TABLET: 8.6; 5 TABLET, FILM COATED ORAL at 01:04

## 2019-04-26 RX ADMIN — ACETAMINOPHEN 1000 MG: 500 TABLET ORAL at 11:04

## 2019-04-26 RX ADMIN — STANDARDIZED SENNA CONCENTRATE AND DOCUSATE SODIUM 1 TABLET: 8.6; 5 TABLET, FILM COATED ORAL at 09:04

## 2019-04-26 RX ADMIN — DIPHENHYDRAMINE HYDROCHLORIDE 12.5 MG: 50 INJECTION INTRAMUSCULAR; INTRAVENOUS at 11:04

## 2019-04-26 RX ADMIN — DEXTROSE AND SODIUM CHLORIDE: 5; .45 INJECTION, SOLUTION INTRAVENOUS at 04:04

## 2019-04-26 RX ADMIN — GABAPENTIN 800 MG: 400 CAPSULE ORAL at 09:04

## 2019-04-26 RX ADMIN — AMLODIPINE BESYLATE 10 MG: 10 TABLET ORAL at 09:04

## 2019-04-26 RX ADMIN — FLUOXETINE 40 MG: 20 CAPSULE ORAL at 09:04

## 2019-04-26 RX ADMIN — ALBUTEROL SULFATE 2.5 MG: 2.5 SOLUTION RESPIRATORY (INHALATION) at 11:04

## 2019-04-26 RX ADMIN — HYDROMORPHONE HYDROCHLORIDE 1 MG: 1 INJECTION, SOLUTION INTRAMUSCULAR; INTRAVENOUS; SUBCUTANEOUS at 12:04

## 2019-04-26 RX ADMIN — HYDROMORPHONE HYDROCHLORIDE 1 MG: 1 INJECTION, SOLUTION INTRAMUSCULAR; INTRAVENOUS; SUBCUTANEOUS at 06:04

## 2019-04-26 RX ADMIN — ACETAMINOPHEN 1000 MG: 500 TABLET ORAL at 09:04

## 2019-04-26 RX ADMIN — GABAPENTIN 800 MG: 400 CAPSULE ORAL at 01:04

## 2019-04-26 RX ADMIN — HYDROMORPHONE HYDROCHLORIDE 1 MG: 1 INJECTION, SOLUTION INTRAMUSCULAR; INTRAVENOUS; SUBCUTANEOUS at 01:04

## 2019-04-26 RX ADMIN — CEFTRIAXONE 2 G: 2 INJECTION, SOLUTION INTRAVENOUS at 11:04

## 2019-04-26 RX ADMIN — CARBAMAZEPINE 800 MG: 200 TABLET ORAL at 09:04

## 2019-04-26 RX ADMIN — POTASSIUM CHLORIDE 40 MEQ: 1500 TABLET, EXTENDED RELEASE ORAL at 09:04

## 2019-04-26 RX ADMIN — ACETAMINOPHEN 650 MG: 325 TABLET ORAL at 01:04

## 2019-04-26 RX ADMIN — PREDNISONE 40 MG: 20 TABLET ORAL at 09:04

## 2019-04-26 RX ADMIN — ALBUTEROL SULFATE 2.5 MG: 2.5 SOLUTION RESPIRATORY (INHALATION) at 12:04

## 2019-04-26 RX ADMIN — POLYETHYLENE GLYCOL 3350 17 G: 17 POWDER, FOR SOLUTION ORAL at 09:04

## 2019-04-26 RX ADMIN — IPRATROPIUM BROMIDE AND ALBUTEROL SULFATE 3 ML: .5; 3 SOLUTION RESPIRATORY (INHALATION) at 08:04

## 2019-04-26 RX ADMIN — DIPHENHYDRAMINE HYDROCHLORIDE 12.5 MG: 50 INJECTION INTRAMUSCULAR; INTRAVENOUS at 04:04

## 2019-04-26 RX ADMIN — AZITHROMYCIN 500 MG: 250 TABLET, FILM COATED ORAL at 06:04

## 2019-04-26 RX ADMIN — SODIUM CHLORIDE 1000 ML: 0.45 INJECTION, SOLUTION INTRAVENOUS at 03:04

## 2019-04-26 RX ADMIN — HYDROMORPHONE HYDROCHLORIDE 1 MG: 1 INJECTION, SOLUTION INTRAMUSCULAR; INTRAVENOUS; SUBCUTANEOUS at 04:04

## 2019-04-26 RX ADMIN — IPRATROPIUM BROMIDE AND ALBUTEROL SULFATE 3 ML: .5; 3 SOLUTION RESPIRATORY (INHALATION) at 04:04

## 2019-04-26 RX ADMIN — DIPHENHYDRAMINE HYDROCHLORIDE 12.5 MG: 50 INJECTION INTRAMUSCULAR; INTRAVENOUS at 06:04

## 2019-04-26 RX ADMIN — OXYCODONE HYDROCHLORIDE 10 MG: 10 TABLET ORAL at 09:04

## 2019-04-26 RX ADMIN — ENOXAPARIN SODIUM 40 MG: 100 INJECTION SUBCUTANEOUS at 09:04

## 2019-04-26 RX ADMIN — DIPHENHYDRAMINE HYDROCHLORIDE 12.5 MG: 50 INJECTION INTRAMUSCULAR; INTRAVENOUS at 10:04

## 2019-04-26 RX ADMIN — HYDROMORPHONE HYDROCHLORIDE 0.5 MG: 1 INJECTION, SOLUTION INTRAMUSCULAR; INTRAVENOUS; SUBCUTANEOUS at 03:04

## 2019-04-26 RX ADMIN — HYDROMORPHONE HYDROCHLORIDE 1 MG: 1 INJECTION, SOLUTION INTRAMUSCULAR; INTRAVENOUS; SUBCUTANEOUS at 09:04

## 2019-04-26 RX ADMIN — IPRATROPIUM BROMIDE AND ALBUTEROL SULFATE 3 ML: .5; 3 SOLUTION RESPIRATORY (INHALATION) at 01:04

## 2019-04-26 RX ADMIN — HYDROMORPHONE HYDROCHLORIDE 0.5 MG: 1 INJECTION, SOLUTION INTRAMUSCULAR; INTRAVENOUS; SUBCUTANEOUS at 07:04

## 2019-04-26 RX ADMIN — HYDROMORPHONE HYDROCHLORIDE 1 MG: 1 INJECTION, SOLUTION INTRAMUSCULAR; INTRAVENOUS; SUBCUTANEOUS at 11:04

## 2019-04-26 RX ADMIN — HYDROMORPHONE HYDROCHLORIDE 1 MG: 1 INJECTION, SOLUTION INTRAMUSCULAR; INTRAVENOUS; SUBCUTANEOUS at 10:04

## 2019-04-26 NOTE — ED NOTES
PT desaturating on room air to 92-93% with increased chest discomfort and tightness, PT placed and 2L O2 and saturation increased to 98%, provider aware.

## 2019-04-26 NOTE — ASSESSMENT & PLAN NOTE
Patient with prior history of iron deficiency, presumed due to heavy menses. Last Ferritin in 1/2019 was 29. Followed by hematology, required infusion in the past, most recently in 7/2018.    Hg on admit 11.8 at baseline per chart review. MCV 69, however patient with Beta thalassemia. Ferritin would likely be elevated 2/2 acute crisis so unlikely helpful in assessing iron status at this time    - CBC daily  - Consider repeat Iron studies once acute chest resolves

## 2019-04-26 NOTE — PLAN OF CARE
04/26/19 1614   Discharge Assessment   Assessment Type Discharge Planning Assessment   Confirmed/corrected address and phone number on facesheet? Yes   Assessment information obtained from? Patient   Communicated expected length of stay with patient/caregiver yes   Prior to hospitilization cognitive status: Alert/Oriented   Prior to hospitalization functional status: Independent   Current cognitive status: Alert/Oriented   Current Functional Status: Independent   Lives With parent(s);child(jayce), adult  (Lives with mother and 12 and 15 y/o daughter.)   Able to Return to Prior Arrangements yes   Is patient able to care for self after discharge? Yes   Who are your caregiver(s) and their phone number(s)?   (Phyllis Walton (Mother) [phone])   Patient's perception of discharge disposition home or selfcare   Readmission Within the Last 30 Days no previous admission in last 30 days   Patient currently being followed by outpatient case management? No   Patient currently receives any other outside agency services? No   Equipment Currently Used at Home none   Do you have any problems affording any of your prescribed medications? No   Is the patient taking medications as prescribed? yes   Does the patient have transportation home? Yes   Transportation Anticipated family or friend will provide   Discharge Plan A Home with family   Discharge Plan B Home   DME Needed Upon Discharge  none   Patient/Family in Agreement with Plan yes

## 2019-04-26 NOTE — ASSESSMENT & PLAN NOTE
Patient with history of Sickle cell-beta thalassemia, presenting with acute pain crisis. Did not respond to outpatient PO oxy 10mg. Patient reporting fevers, chills, and cough night before presentation. Cough productive of white sputum, patient febrile and tachycardic on presentation. Flu negative. Works as a nurse, was caring for multiple patients with pneumonia. CXR negative for consolidation, however given history and chest pain was given dose of ceftriaxone in ED.  Patient also currently on menstrual period and she reports having significant, heavy flow. Likely exacerbating symptoms. Reticulocyte count 2.7. Patient not on hydroxyurea due to side effects.    Baseline Hg 11.8-12.0 per chart review, at baseline on presentation today. Given fevers, chest pain, and cough concerning for acute chest. It is possible the radiographic evidence is lagging behind presentation.     Plan:  - ABX: Continuing ceftriaxone and azithromycin  - Pain control: Scheduled dilauded IV 1mg q4h with 0.5mg IV prn. Will increase as needed  - Supplemental O2, incentive spirometry and Acapella treatments ordered  - Albuterol nebs prn for wheezing  - Will recheck CXR in the AM  - Daily reticulocyte count  - DVT Prophylaxis: Lovenox   - Will check quantitative Ig levels as patient states she has multiple episodes of pneumonia per year

## 2019-04-26 NOTE — ASSESSMENT & PLAN NOTE
Sickle cell disease with beta thalassemia and history of acute chest.  Currently not on hydroxyurea or L-glutamine.  Reviewed CXR and without significant change compared to 3/2019 cxr.  Stable on 2LNC.  Physical exam unremarkable and hemoglobin at baseline.  -please check hemoglobin S quant  -IV fluids and pain control for her pain episode  -re-check EKG for QTc 596ms; ideally this would be done when her tachycardia has resolved.    --if QTc normalizes, ok to write for prn anti-nausea medications.

## 2019-04-26 NOTE — H&P
"[facility] Hospital Medicine  History & Physical    Patient Name: Nazanin Malone  MRN: [id number]  Admission Date: 4/25/2019  Attending Physician: Neel Rahman, *   Primary Care Provider: Balta Forbes MD    [facility] Medicine Team: [facility] HOSP MED 5 Raleigh Guillen MD     Patient information was obtained from patient, past medical records and ER records.     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 Pain woke her at 11 am, took a percocet, didnt help 'only made it worse."         HPI: Ms. Malone is a 37 year old lady with a past medical history significant for sickle cell beta-thalassemia, asthma, HTN, and trigeminal neuralgia who presents to [facility] ED with complaints of cough, shortness of breath, and body pain. The patient states symptoms began this morning with a headache. She took home dose of percocet 10mg that did not help her symptoms. She then began to notice bilateral leg and toe, bilateral hip, left arm, and chest pain with breathing which prompted her to come in to the ED for further evaluation. The patient also endorsed associated shortness of breath. She endorses a cough productive of thick white sputum that began overnight as well as fevers (103.1) and chills at home. 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 She endorses good PO intake. She has a history of asthma and tried her albuterol rescue inhaler that did not improve her shortness of breath. The patient is currently on her menstrual cycle and she endorses heavy flow with associated clots which is normal for her. She states that her sickle cell often flares when she is on her cycle. She reports one previous episode of acute chest syndrome in 2017.    The patient follows with Hematology at " [facility] for her Sickle beta-thalassemia, last seen in clinic on 19. Her baseline Hg is 11.8-12.0 and she states she has only ever needed a transfusion when she was 13 years old or so. She had previously required iron infusions in the past, most recently in 2018 but has not needed them since. She was previously on hydroxyurea but stopped the medication on her own due to complaints of rash. She was offered L-glutamine but it was not covered by insurance.    In the ED the patient was febrile and required IV narcotics for pain relief. CXR did not show any focal consolidation. She O2 sat was 99% on room air. Given fever and chest pain she was given 1x dose of rocefin and IVFs She was admitted to hospital medicine for sickle cell pain crisis.      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      Current Outpatient Medications on File Prior to Encounter   Medication Sig    albuterol (VENTOLIN HFA) 90 mcg/actuation inhaler Inhale 2 puffs into the lungs every 6 (six) hours as needed for Wheezing. Rescue    amLODIPine (NORVASC) 10 MG tablet Take 1 tablet (10 mg total) by mouth once daily.    carBAMazepine (TEGRETOL) 200 mg tablet Take 4 tablets (800 mg total) by mouth 2 (two) times daily.    cyclobenzaprine (FLEXERIL) 10 MG tablet Take 1 tablet (10 mg total) by mouth 3  (three) times daily as needed.    ergocalciferol (VITAMIN D2) 50,000 unit Cap Take 1 capsule (50,000 Units total) by mouth every 7 days.    FLUoxetine 40 MG capsule TAKE 1 CAPSULE BY MOUTH EVERY DAY    fluticasone (FLONASE) 50 mcg/actuation nasal spray 2 sprays (100 mcg total) by Each Nare route once daily.    gabapentin (NEURONTIN) 800 MG tablet TAKE 1 TABLET (800 MG TOTAL) BY MOUTH 3 (THREE) TIMES DAILY.    hydroCHLOROthiazide (HYDRODIURIL) 25 MG tablet Take 1 tablet (25 mg total) by mouth once daily.    ondansetron (ZOFRAN-ODT) 8 MG TbDL Take 1 tablet (8 mg total) by mouth every 6 (six) hours as needed.    oxyCODONE-acetaminophen (PERCOCET)  mg per tablet Take 1 tablet by mouth every 4 (four) hours as needed for Pain.    prochlorperazine (COMPAZINE) 10 MG tablet Take 1 tablet (10 mg total) by mouth every 6 (six) hours as needed.    senna-docusate 8.6-50 mg (PERICOLACE) 8.6-50 mg per tablet Take 1 tablet by mouth 2 (two) times daily.    albuterol (ACCUNEB) 0.63 mg/3 mL Nebu Take 3 mLs (0.63 mg total) by nebulization every 6 (six) hours as needed (for wheezing/shortness of breath). Rescue    clonazePAM (KLONOPIN) 2 MG Tab Take 1 tablet (2 mg total) by mouth once daily.    cyclobenzaprine (FLEXERIL) 10 MG tablet Take 1 tablet (10 mg total) by mouth 3 (three) times daily as needed.    hydrOXYzine HCl (ATARAX) 25 MG tablet     promethazine (PHENERGAN) 6.25 mg/5 mL syrup Take 20 mLs (25 mg total) by mouth every 6 (six) hours as needed for Nausea.     Family History     Problem Relation (Age of Onset)    Diabetes Mother    Heart disease Mother, Father        Tobacco Use    Smoking status: Never Smoker    Smokeless tobacco: Never Used   Substance and Sexual Activity    Alcohol use: No    Drug use: No    Sexual activity: Not Currently     Birth control/protection: See Surgical Hx     Review of Systems   Constitutional: Positive for chills and fever. Negative for unexpected weight change.   HENT:  Negative for rhinorrhea, sinus pressure, sinus pain, sneezing and sore throat.    Eyes: Negative for visual disturbance.   Respiratory: Positive for cough (productive of white sputum), chest tightness and wheezing. Negative for shortness of breath.    Cardiovascular: Positive for chest pain and leg swelling. Negative for palpitations.   Gastrointestinal: Negative for abdominal pain, constipation, diarrhea, nausea and vomiting.   Genitourinary: Negative for difficulty urinating, dysuria and flank pain.   Musculoskeletal: Positive for arthralgias and myalgias. Negative for neck stiffness.   Skin: Negative for pallor and rash.   Neurological: Positive for headaches. Negative for seizures, syncope and weakness.   Hematological: Negative for adenopathy.     Objective:     Vital Signs (Most Recent):  Temp: 99.7 °F (37.6 °C) (04/25/19 2249)  Pulse: (!) 111 (04/26/19 0009)  Resp: 20 (04/26/19 0009)  BP: (!) 185/84 (04/25/19 2249)  SpO2: 98 % (04/26/19 0009) Vital Signs (24h Range):  Temp:  [99.3 °F (37.4 °C)-102.8 °F (39.3 °C)] 99.7 °F (37.6 °C)  Pulse:  [103-132] 111  Resp:  [20] 20  SpO2:  [94 %-98 %] 98 %  BP: (148-185)/(73-96) 185/84     Weight: 136.1 kg (300 lb)  Body mass index is 54.87 kg/m².    Physical Exam   Constitutional: She is oriented to person, place, and time. She appears well-developed and well-nourished. No distress.   HENT:   Head: Normocephalic and atraumatic.   Mouth/Throat: No oropharyngeal exudate.   Eyes: Pupils are equal, round, and reactive to light. EOM are normal. No scleral icterus.   Neck: Normal range of motion. Neck supple.   Cardiovascular: Normal rate, regular rhythm, normal heart sounds and intact distal pulses. Exam reveals no friction rub.   No murmur heard.  Pulmonary/Chest: Effort normal. No respiratory distress. She has wheezes (left lung fields).   Abdominal: Soft. Bowel sounds are normal. She exhibits no distension. There is no tenderness. There is no guarding.   Musculoskeletal:  Normal range of motion. She exhibits tenderness (bilateral knees and ankles). She exhibits no edema.   Lymphadenopathy:     She has no cervical adenopathy.   Neurological: She is oriented to person, place, and time. No cranial nerve deficit or sensory deficit.   Skin: Skin is warm and dry. No rash noted. No erythema. No pallor.   Nursing note and vitals reviewed.        CRANIAL NERVES     CN III, IV, VI   Pupils are equal, round, and reactive to light.  Extraocular motions are normal.        Significant Labs:   CBC:   Recent Labs   Lab 04/25/19 1845 04/26/19  0342   WBC 8.25 7.90   HGB 11.8* 10.4*   HCT 35.8* 32.5*    189     CMP:   Recent Labs   Lab 04/25/19 1845 04/26/19 0342    137   K 3.4* 3.3*    105   CO2 25 24    116*   BUN 6 6   CREATININE 0.8 0.8   CALCIUM 9.8 8.8   PROT 8.5* 7.3   ALBUMIN 4.0 3.5   BILITOT 0.6 0.5   ALKPHOS 105 86   AST 17 18   ALT 10 9*   ANIONGAP 11 8   EGFRNONAA >60.0 >60.0     All pertinent labs within the past 24 hours have been reviewed.      Significant Imaging:     Narrative     EXAMINATION:  XR CHEST PA AND LATERAL    CLINICAL HISTORY:  Shortness of breath    TECHNIQUE:  PA and lateral views of the chest were performed.    COMPARISON:  None    FINDINGS:  The lungs are clear, with normal appearance of pulmonary vasculature and no pleural effusion or pneumothorax.    The cardiac silhouette is normal in size. The hilar and mediastinal contours are unremarkable.    Bones are intact.      Impression       No acute abnormality.      Electronically signed by: Nelson Mills MD  Date: 04/25/2019  Time: 18:27         Assessment/Plan:     * Sickle cell disease, type S beta-zero thalassemia, with acute chest syndrome  Patient with history of Sickle cell-beta thalassemia, presenting with acute pain crisis. Did not respond to outpatient PO oxy 10mg. Patient reporting fevers, chills, and cough night before presentation. Cough productive of white sputum, patient  "febrile and tachycardic on presentation. Flu negative. Works as a nurse, was caring for multiple patients with pneumonia. CXR negative for consolidation, however given history and chest pain was given dose of ceftriaxone in ED.  Patient also currently on menstrual period and she reports having significant, heavy flow. Likely exacerbating symptoms. Reticulocyte count 2.7. Patient not on hydroxyurea due to side effects.    Baseline Hg 11.8-12.0 per chart review, at baseline on presentation today. Given fevers, chest pain, and cough concerning for acute chest. It is possible the radiographic evidence is lagging behind presentation. Will treat for acute chest at this time and repeat CXR in AM.    Plan:  - ABX: Continuing ceftriaxone and azithromycin  - Pain control: Scheduled dilauded IV 1mg q4h with 0.5mg IV prn. Will increase as needed  - Supplemental O2, incentive spirometry and Acapella treatments ordered  - Albuterol nebs prn for wheezing  - Will recheck CXR in the AM  - Daily reticulocyte count  - DVT Prophylaxis: Lovenox   - Will check quantitative Ig levels as patient states she has multiple episodes of pneumonia per year      Anxiety  Patient states she has significant anxiety, describes subclinical "stroke" in past however after discussing with patient she believes it was 2/2 anxiety as her daughter had brought a stranger into their home while the patient was hospitalized. She was experiencing left arm and leg weakness, eye twitching, and difficulty speaking. Symptoms at that time resolved acutely with prn hydralazine. CT head was negative.    Previously on klonopin and remeron however she states she no longer takes them as symptoms well controlled on fluoxetine 40mg    - Continued home fluoxetine      Morbid obesity due to excess calories  Cardiac diet.  No history of diabetes, checking A1c on admit      Benign essential HTN  Patient on Norvasc 10mg and HCTZ 25mg at home Hypertensive and tachycardic on " presentation, likely 2/2 pain. Patient did not take HCTZ prior to admit.    - Resumed norvasc 10mg  - Holding HCTZ at this time as patient currently in acute vaso-occlusive crisis, would D/C as intravascular depletion could precipitate sickle cell pain crisis in future      Iron deficiency anemia  Patient with prior history of iron deficiency, presumed due to heavy menses. Last Ferritin in 1/2019 was 29. Followed by hematology, required infusion in the past, most recently in 7/2018.    Hg on admit 11.8 at baseline per chart review. MCV 69, however patient with Beta thalassemia. Ferritin would likely be elevated 2/2 acute crisis so unlikely helpful in assessing iron status at this time    - CBC daily  - Consider repeat Iron studies once acute chest resolves      VTE Risk Mitigation (From admission, onward)        Ordered     enoxaparin injection 40 mg  Every 12 hours      04/26/19 0014     IP VTE HIGH RISK PATIENT  Once      04/26/19 0014             Raleigh Guillen MD  Department of Hospital Medicine   [facility]

## 2019-04-26 NOTE — ASSESSMENT & PLAN NOTE
"Patient states she has significant anxiety, describes subclinical "stroke" in past however after discussing with patient she believes it was 2/2 anxiety as her daughter had brought a stranger into their home while the patient was hospitalized. She was experiencing left arm and leg weakness, eye twitching, and difficulty speaking. Symptoms at that time resolved acutely with prn hydralazine. CT head was negative.    Previously on klonopin and remeron however she states she no longer takes them as symptoms well controlled on fluoxetine 40mg    - Continued home fluoxetine    "

## 2019-04-26 NOTE — SUBJECTIVE & OBJECTIVE
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      Current Outpatient Medications on File Prior to Encounter   Medication Sig    albuterol (VENTOLIN HFA) 90 mcg/actuation inhaler Inhale 2 puffs into the lungs every 6 (six) hours as needed for Wheezing. Rescue    amLODIPine (NORVASC) 10 MG tablet Take 1 tablet (10 mg total) by mouth once daily.    carBAMazepine (TEGRETOL) 200 mg tablet Take 4 tablets (800 mg total) by mouth 2 (two) times daily.    cyclobenzaprine (FLEXERIL) 10 MG tablet Take 1 tablet (10 mg total) by mouth 3 (three) times daily as needed.    ergocalciferol (VITAMIN D2) 50,000 unit Cap Take 1 capsule (50,000 Units total) by mouth every 7 days.    FLUoxetine 40 MG capsule TAKE 1 CAPSULE BY MOUTH EVERY DAY    fluticasone (FLONASE) 50 mcg/actuation nasal spray 2 sprays (100 mcg total) by Each Nare route once daily.    gabapentin (NEURONTIN) 800 MG tablet TAKE 1 TABLET (800 MG TOTAL) BY MOUTH 3 (THREE) TIMES DAILY.    hydroCHLOROthiazide (HYDRODIURIL) 25 MG tablet Take 1 tablet (25 mg total) by mouth once daily.    ondansetron (ZOFRAN-ODT) 8 MG TbDL Take 1 tablet (8 mg total) by mouth every 6 (six) hours as needed.    oxyCODONE-acetaminophen (PERCOCET)  mg per tablet Take 1 tablet by mouth every 4 (four) hours as needed for Pain.     prochlorperazine (COMPAZINE) 10 MG tablet Take 1 tablet (10 mg total) by mouth every 6 (six) hours as needed.    senna-docusate 8.6-50 mg (PERICOLACE) 8.6-50 mg per tablet Take 1 tablet by mouth 2 (two) times daily.    albuterol (ACCUNEB) 0.63 mg/3 mL Nebu Take 3 mLs (0.63 mg total) by nebulization every 6 (six) hours as needed (for wheezing/shortness of breath). Rescue    clonazePAM (KLONOPIN) 2 MG Tab Take 1 tablet (2 mg total) by mouth once daily.    cyclobenzaprine (FLEXERIL) 10 MG tablet Take 1 tablet (10 mg total) by mouth 3 (three) times daily as needed.    hydrOXYzine HCl (ATARAX) 25 MG tablet     promethazine (PHENERGAN) 6.25 mg/5 mL syrup Take 20 mLs (25 mg total) by mouth every 6 (six) hours as needed for Nausea.     Family History     Problem Relation (Age of Onset)    Diabetes Mother    Heart disease Mother, Father        Tobacco Use    Smoking status: Never Smoker    Smokeless tobacco: Never Used   Substance and Sexual Activity    Alcohol use: No    Drug use: No    Sexual activity: Not Currently     Birth control/protection: See Surgical Hx     Review of Systems   Constitutional: Positive for chills and fever. Negative for unexpected weight change.   HENT: Negative for rhinorrhea, sinus pressure, sinus pain, sneezing and sore throat.    Eyes: Negative for visual disturbance.   Respiratory: Positive for cough (productive of white sputum), chest tightness and wheezing. Negative for shortness of breath.    Cardiovascular: Positive for chest pain and leg swelling. Negative for palpitations.   Gastrointestinal: Negative for abdominal pain, constipation, diarrhea, nausea and vomiting.   Genitourinary: Negative for difficulty urinating, dysuria and flank pain.   Musculoskeletal: Positive for arthralgias and myalgias. Negative for neck stiffness.   Skin: Negative for pallor and rash.   Neurological: Positive for headaches. Negative for seizures, syncope and weakness.   Hematological: Negative for  adenopathy.     Objective:     Vital Signs (Most Recent):  Temp: 99.7 °F (37.6 °C) (04/25/19 2249)  Pulse: (!) 111 (04/26/19 0009)  Resp: 20 (04/26/19 0009)  BP: (!) 185/84 (04/25/19 2249)  SpO2: 98 % (04/26/19 0009) Vital Signs (24h Range):  Temp:  [99.3 °F (37.4 °C)-102.8 °F (39.3 °C)] 99.7 °F (37.6 °C)  Pulse:  [103-132] 111  Resp:  [20] 20  SpO2:  [94 %-98 %] 98 %  BP: (148-185)/(73-96) 185/84     Weight: 136.1 kg (300 lb)  Body mass index is 54.87 kg/m².    Physical Exam   Constitutional: She is oriented to person, place, and time. She appears well-developed and well-nourished. No distress.   HENT:   Head: Normocephalic and atraumatic.   Mouth/Throat: No oropharyngeal exudate.   Eyes: Pupils are equal, round, and reactive to light. EOM are normal. No scleral icterus.   Neck: Normal range of motion. Neck supple.   Cardiovascular: Normal rate, regular rhythm, normal heart sounds and intact distal pulses. Exam reveals no friction rub.   No murmur heard.  Pulmonary/Chest: Effort normal. No respiratory distress. She has wheezes (left lung fields).   Abdominal: Soft. Bowel sounds are normal. She exhibits no distension. There is no tenderness. There is no guarding.   Musculoskeletal: Normal range of motion. She exhibits tenderness (bilateral knees and ankles). She exhibits no edema.   Lymphadenopathy:     She has no cervical adenopathy.   Neurological: She is oriented to person, place, and time. No cranial nerve deficit or sensory deficit.   Skin: Skin is warm and dry. No rash noted. No erythema. No pallor.   Nursing note and vitals reviewed.        CRANIAL NERVES     CN III, IV, VI   Pupils are equal, round, and reactive to light.  Extraocular motions are normal.        Significant Labs:   CBC:   Recent Labs   Lab 04/25/19  1845 04/26/19  0342   WBC 8.25 7.90   HGB 11.8* 10.4*   HCT 35.8* 32.5*    189     CMP:   Recent Labs   Lab 04/25/19  1845 04/26/19  0342    137   K 3.4* 3.3*    105   CO2 25 24     116*   BUN 6 6   CREATININE 0.8 0.8   CALCIUM 9.8 8.8   PROT 8.5* 7.3   ALBUMIN 4.0 3.5   BILITOT 0.6 0.5   ALKPHOS 105 86   AST 17 18   ALT 10 9*   ANIONGAP 11 8   EGFRNONAA >60.0 >60.0     All pertinent labs within the past 24 hours have been reviewed.      Significant Imaging:     Narrative     EXAMINATION:  XR CHEST PA AND LATERAL    CLINICAL HISTORY:  Shortness of breath    TECHNIQUE:  PA and lateral views of the chest were performed.    COMPARISON:  None    FINDINGS:  The lungs are clear, with normal appearance of pulmonary vasculature and no pleural effusion or pneumothorax.    The cardiac silhouette is normal in size. The hilar and mediastinal contours are unremarkable.    Bones are intact.      Impression       No acute abnormality.      Electronically signed by: Nelson Mills MD  Date: 04/25/2019  Time: 18:27

## 2019-04-26 NOTE — ASSESSMENT & PLAN NOTE
Patient with history of Sickle cell-beta thalassemia, presenting with acute pain crisis. Did not respond to outpatient PO oxy 10mg. Patient reporting fevers, chills, and cough night before presentation. Cough productive of white sputum, patient febrile and tachycardic on presentation. Flu negative. Works as a nurse, was caring for multiple patients with pneumonia. CXR negative for consolidation, however given history and chest pain was given dose of ceftriaxone in ED.  Patient also currently on menstrual period and she reports having significant, heavy flow. Likely exacerbating symptoms. Reticulocyte count 2.7. Patient not on hydroxyurea due to side effects.    Baseline Hg 11.8-12.0 per chart review, at baseline on presentation today. Given fevers, chest pain, and cough concerning for acute chest.   -Assessed by Heme/onc, not likely she is having acute sickle cell crisis given low retic count & stable hb, HBS quantitative in process    Plan:  - ABX: Continuing ceftriaxone and azithromycin  - Pain control: Scheduled dilauded IV 1mg q4h with 0.5mg IV prn. GWill increase as needed  - Supplemental O2, incentive spirometry and Acapella treatments ordered  - Albuterol nebs prn for wheezing  - Will recheck CXR in the AM  - Daily reticulocyte count  - DVT Prophylaxis: Lovenox   - Will check quantitative Ig levels as patient states she has multiple episodes of pneumonia per year

## 2019-04-26 NOTE — HPI
Ms. Malone is a 37 year old lady with a past medical history significant for sickle cell beta-thalassemia, asthma, HTN, and trigeminal neuralgia who presents to [facility] [place] ED with complaints of cough, shortness of breath, and body pain. The patient states symptoms began this morning with a headache. She took home dose of percocet 10mg that did not help her symptoms. She then began to notice bilateral leg and toe, bilateral hip, left arm, and chest pain with breathing which prompted her to come in to the ED for further evaluation. The patient also endorsed associated shortness of breath. She endorses a cough productive of thick white sputum that began overnight as well as fevers (103.1) and chills at home. 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 She endorses good PO intake. She has a history of asthma and tried her albuterol rescue inhaler that did not improve her shortness of breath. The patient is currently on her menstrual cycle and she endorses heavy flow with associated clots which is normal for her. She states that her sickle cell often flares when she is on her cycle. She reports one previous episode of acute chest syndrome in 2017.    The patient follows with Hematology at [facility] [place] [place] for her Sickle beta-thalassemia, last seen in clinic on 1/9/19. Her baseline Hg is 11.8-12.0 and she states she has only ever needed a transfusion when she was 13 years old or so. She had previously required iron infusions in the past, most recently in July 2018 but has not needed them since. She was previously on hydroxyurea but stopped the medication on her own due to complaints of rash. She was offered L-glutamine but it was not covered by insurance.    In the ED the patient was febrile and required IV narcotics for pain relief. CXR did not show any focal consolidation. She O2 sat was 99% on room air. Given fever and chest pain she was given 1x dose of rocefin and IVFs She was admitted to  hospital medicine for sickle cell pain crisis.

## 2019-04-26 NOTE — HPI
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  She reports arthralgias in her shoulders, back, hips, and pain in her left leg; all of which are consistent with prior pain episodes.  Associated symptoms include fever with Tmax of 103, shortness of breath, and cough.  She did not develop a cough until day of admission, which she describes as yellow-green phlegm.  At baseline she is not on chronic pain medication and has had intolerance to hydroxyurea (full body rash at low dose).  She has not been able to find a pharmacy to fill her L-glutamine prescription.

## 2019-04-26 NOTE — SUBJECTIVE & OBJECTIVE
Oncology Treatment Plan:   [No treatment plan]    Medications:  Continuous Infusions:   dextrose 5 % and 0.45 % NaCl 125 mL/hr at 19 0435     Scheduled Meds:   acetaminophen  1,000 mg Oral Q6H    albuterol-ipratropium  3 mL Nebulization Q4H WAKE    amLODIPine  10 mg Oral Daily    azithromycin  500 mg Intravenous Q24H    carBAMazepine  800 mg Oral BID    cefTRIAXone (ROCEPHIN) IVPB  2 g Intravenous Q24H    enoxparin  40 mg Subcutaneous Q12H    FLUoxetine  40 mg Oral Daily    gabapentin  800 mg Oral BID    polyethylene glycol  17 g Oral Daily    predniSONE  40 mg Oral Daily    senna-docusate 8.6-50 mg  1 tablet Oral BID     PRN Meds:albuterol sulfate, dextrose 50%, dextrose 50%, diphenhydrAMINE, glucagon (human recombinant), glucose, glucose, HYDROmorphone, naloxone, oxyCODONE, sodium chloride 0.9%     Review of patient's allergies indicates:  No Known Allergies     Past Medical History:   Diagnosis Date    Abnormal Pap smear of cervix     colposcopy    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    Asthma     Depression     Hypertension     Morbid obesity     Opioid dependence 2017    Pneumonia due to Streptococcus pneumoniae 2017    Sepsis due to Streptococcus pneumoniae 2017    Sickle cell-beta thalassemia disease with pain     Trigeminal neuralgia      Past Surgical History:   Procedure Laterality Date     SECTION      TONSILLECTOMY      TUBAL LIGATION       Family History     Problem Relation (Age of Onset)    Diabetes Mother    Heart disease Mother, Father        Tobacco Use    Smoking status: Never Smoker    Smokeless tobacco: Never Used   Substance and Sexual Activity    Alcohol use: No    Drug use: No    Sexual activity: Not Currently     Birth control/protection: See Surgical Hx       Review of Systems   Constitutional: Positive for fever. Negative for chills, diaphoresis, fatigue and unexpected weight change.   HENT: Negative  for mouth sores and nosebleeds.    Eyes: Negative for visual disturbance.   Respiratory: Positive for cough and shortness of breath.    Cardiovascular: Negative for chest pain and leg swelling.   Gastrointestinal: Negative for abdominal distention, abdominal pain, constipation, diarrhea and nausea.   Genitourinary: Negative for vaginal bleeding.   Musculoskeletal: Positive for arthralgias and back pain.   Skin: Negative for color change.   Neurological: Negative for dizziness, weakness and light-headedness.   Hematological: Negative for adenopathy.   Psychiatric/Behavioral: Negative for confusion.     Objective:     Vital Signs (Most Recent):  Temp: 98.9 °F (37.2 °C) (04/26/19 1332)  Pulse: 105 (04/26/19 1332)  Resp: 18 (04/26/19 1332)  BP: 131/69 (04/26/19 1332)  SpO2: 98 % (04/26/19 1332) Vital Signs (24h Range):  Temp:  [98.9 °F (37.2 °C)-102.8 °F (39.3 °C)] 98.9 °F (37.2 °C)  Pulse:  [] 105  Resp:  [16-21] 18  SpO2:  [94 %-100 %] 98 %  BP: (131-185)/(69-96) 131/69     Weight: 136.1 kg (300 lb)  Body mass index is 54.87 kg/m².  Body surface area is 2.44 meters squared.      Intake/Output Summary (Last 24 hours) at 4/26/2019 1442  Last data filed at 4/26/2019 0434  Gross per 24 hour   Intake 1948.33 ml   Output --   Net 1948.33 ml       Physical Exam   Constitutional: She appears well-developed.   HENT:   Head: Normocephalic.   Eyes: Pupils are equal, round, and reactive to light. EOM are normal. No scleral icterus.   Neck: Normal range of motion. No tracheal deviation present.   Cardiovascular: Normal rate, regular rhythm and normal heart sounds. Exam reveals no gallop and no friction rub.   No murmur heard.  Pulmonary/Chest: Effort normal and breath sounds normal. No respiratory distress. She has no wheezes. She has no rales.   2LNC   Abdominal: Soft. Bowel sounds are normal. She exhibits no distension and no mass. There is no tenderness. There is no guarding.   Musculoskeletal: Normal range of motion. She  exhibits no edema.   Lymphadenopathy:     She has no cervical adenopathy.   Neurological: She is alert.   Skin: Skin is warm and dry.   Psychiatric: She has a normal mood and affect.       Significant Labs:   CBC:   Recent Labs   Lab 04/25/19 1845 04/26/19  0342   WBC 8.25 7.90   HGB 11.8* 10.4*   HCT 35.8* 32.5*    189    and CMP:   Recent Labs   Lab 04/25/19 1845 04/26/19  0342    137   K 3.4* 3.3*    105   CO2 25 24    116*   BUN 6 6   CREATININE 0.8 0.8   CALCIUM 9.8 8.8   PROT 8.5* 7.3   ALBUMIN 4.0 3.5   BILITOT 0.6 0.5   ALKPHOS 105 86   AST 17 18   ALT 10 9*   ANIONGAP 11 8   EGFRNONAA >60.0 >60.0       Diagnostic Results:  I have reviewed all pertinent imaging results/findings within the past 24 hours.     4/26/19 cxr  FINDINGS:  Cardiac monitoring leads overlie the chest.  Cardiomediastinal silhouette appears stable from prior exam.  There are coarse bilateral interstitial markings, similar to prior examination.  No new large confluent airspace consolidation.  There is stable subsegmental atelectasis or scarring within the left lower lung zone.  No evidence of large pleural effusion or pneumothorax.  The visualized osseous structures appear intact.      As above.

## 2019-04-26 NOTE — ED NOTES
Adult Physical Assessment  LOC: Nazanin Malone, 39 y.o. female verified via two identifiers.  The patient is awake, alert, oriented and speaking appropriately at this time.  APPEARANCE: Patient resting comfortably and appears to be in no acute distress at this time. Patient is clean and well groomed, patient's clothing is properly fastened.  SKIN:The skin is warm and dry, color consistent with ethnicity, patient has normal skin turgor and moist mucus membranes, skin intact, no breakdown or brusing noted.  MUSCULOSKELETAL: Patient moving all extremities well, no obvious swelling or deformities noted.Moderate leg pain noted.   RESPIRATORY: Airway is open and patent, respirations are spontaneous, patient has a normal effort and rate, no accessory muscle use noted. Mild chest pressure noted.   CARDIAC: Patient has a normal rate and rhythm, no periphreal edema noted in any extremity, capillary refill < 3 seconds in all extremities  ABDOMEN: Soft and non tender to palpation, no abdominal distention noted. Bowel sounds present in all four quadrants.  NEUROLOGIC: Eyes open spontaneously, behavior appropriate to situation, follows commands, facial expression symmetrical, bilateral hand grasp equal and even, purposeful motor response noted, normal sensation in all extremities when touched with a finger.

## 2019-04-26 NOTE — ASSESSMENT & PLAN NOTE
Patient on Norvasc 10mg and HCTZ 25mg at home Hypertensive and tachycardic on presentation, likely 2/2 pain. Patient did not take HCTZ prior to admit.    - Resumed norvasc 10mg  - Holding HCTZ at this time as patient currently in acute vaso-occlusive crisis, would D/C as intravascular depletion could precipitate sickle cell pain crisis in future

## 2019-04-26 NOTE — PLAN OF CARE
SW following for dc needs.Post acute needs to be determined.             Britta Alejo LMSW  [facility]   [id number]

## 2019-04-27 LAB
ALBUMIN SERPL BCP-MCNC: 3.2 G/DL (ref 3.5–5.2)
ALP SERPL-CCNC: 73 U/L (ref 55–135)
ALT SERPL W/O P-5'-P-CCNC: 10 U/L (ref 10–44)
ANION GAP SERPL CALC-SCNC: 8 MMOL/L (ref 8–16)
AST SERPL-CCNC: 18 U/L (ref 10–40)
BASOPHILS # BLD AUTO: 0.03 K/UL (ref 0–0.2)
BASOPHILS NFR BLD: 0.6 % (ref 0–1.9)
BILIRUB SERPL-MCNC: 0.3 MG/DL (ref 0.1–1)
BNP SERPL-MCNC: 22 PG/ML (ref 0–99)
BUN SERPL-MCNC: 7 MG/DL (ref 6–20)
CALCIUM SERPL-MCNC: 8.8 MG/DL (ref 8.7–10.5)
CHLORIDE SERPL-SCNC: 104 MMOL/L (ref 95–110)
CO2 SERPL-SCNC: 26 MMOL/L (ref 23–29)
CREAT SERPL-MCNC: 0.7 MG/DL (ref 0.5–1.4)
DIFFERENTIAL METHOD: ABNORMAL
EOSINOPHIL # BLD AUTO: 0.2 K/UL (ref 0–0.5)
EOSINOPHIL NFR BLD: 2.8 % (ref 0–8)
ERYTHROCYTE [DISTWIDTH] IN BLOOD BY AUTOMATED COUNT: 17.2 % (ref 11.5–14.5)
EST. GFR  (AFRICAN AMERICAN): >60 ML/MIN/1.73 M^2
EST. GFR  (NON AFRICAN AMERICAN): >60 ML/MIN/1.73 M^2
GLUCOSE SERPL-MCNC: 98 MG/DL (ref 70–110)
HCT VFR BLD AUTO: 30.6 % (ref 37–48.5)
HGB BLD-MCNC: 10 G/DL (ref 12–16)
IMM GRANULOCYTES # BLD AUTO: 0.02 K/UL (ref 0–0.04)
IMM GRANULOCYTES NFR BLD AUTO: 0.4 % (ref 0–0.5)
LYMPHOCYTES # BLD AUTO: 1.7 K/UL (ref 1–4.8)
LYMPHOCYTES NFR BLD: 30.7 % (ref 18–48)
MCH RBC QN AUTO: 22.6 PG (ref 27–31)
MCHC RBC AUTO-ENTMCNC: 32.7 G/DL (ref 32–36)
MCV RBC AUTO: 69 FL (ref 82–98)
MONOCYTES # BLD AUTO: 0.7 K/UL (ref 0.3–1)
MONOCYTES NFR BLD: 13.1 % (ref 4–15)
NEUTROPHILS # BLD AUTO: 2.9 K/UL (ref 1.8–7.7)
NEUTROPHILS NFR BLD: 52.4 % (ref 38–73)
NRBC BLD-RTO: 0 /100 WBC
PLATELET # BLD AUTO: 184 K/UL (ref 150–350)
PMV BLD AUTO: 9.4 FL (ref 9.2–12.9)
POTASSIUM SERPL-SCNC: 3.3 MMOL/L (ref 3.5–5.1)
PROT SERPL-MCNC: 7 G/DL (ref 6–8.4)
RBC # BLD AUTO: 4.43 M/UL (ref 4–5.4)
SODIUM SERPL-SCNC: 138 MMOL/L (ref 136–145)
WBC # BLD AUTO: 5.44 K/UL (ref 3.9–12.7)

## 2019-04-27 PROCEDURE — 93010 ELECTROCARDIOGRAM REPORT: CPT | Mod: ,,, | Performed by: INTERNAL MEDICINE

## 2019-04-27 PROCEDURE — 97165 OT EVAL LOW COMPLEX 30 MIN: CPT

## 2019-04-27 PROCEDURE — 25000242 PHARM REV CODE 250 ALT 637 W/ HCPCS: Performed by: STUDENT IN AN ORGANIZED HEALTH CARE EDUCATION/TRAINING PROGRAM

## 2019-04-27 PROCEDURE — 63600175 PHARM REV CODE 636 W HCPCS: Performed by: STUDENT IN AN ORGANIZED HEALTH CARE EDUCATION/TRAINING PROGRAM

## 2019-04-27 PROCEDURE — 93005 ELECTROCARDIOGRAM TRACING: CPT | Performed by: INTERNAL MEDICINE

## 2019-04-27 PROCEDURE — 25000003 PHARM REV CODE 250: Performed by: STUDENT IN AN ORGANIZED HEALTH CARE EDUCATION/TRAINING PROGRAM

## 2019-04-27 PROCEDURE — 83880 ASSAY OF NATRIURETIC PEPTIDE: CPT

## 2019-04-27 PROCEDURE — 94640 AIRWAY INHALATION TREATMENT: CPT

## 2019-04-27 PROCEDURE — 27000221 HC OXYGEN, UP TO 24 HOURS

## 2019-04-27 PROCEDURE — 36415 COLL VENOUS BLD VENIPUNCTURE: CPT

## 2019-04-27 PROCEDURE — 93010 EKG 12-LEAD: ICD-10-PCS | Mod: ,,, | Performed by: INTERNAL MEDICINE

## 2019-04-27 PROCEDURE — 99233 SBSQ HOSP IP/OBS HIGH 50: CPT | Mod: ,,, | Performed by: INTERNAL MEDICINE

## 2019-04-27 PROCEDURE — 99233 PR SUBSEQUENT HOSPITAL CARE,LEVL III: ICD-10-PCS | Mod: ,,, | Performed by: INTERNAL MEDICINE

## 2019-04-27 PROCEDURE — 80053 COMPREHEN METABOLIC PANEL: CPT

## 2019-04-27 PROCEDURE — 11000001 HC ACUTE MED/SURG PRIVATE ROOM

## 2019-04-27 PROCEDURE — 94664 DEMO&/EVAL PT USE INHALER: CPT

## 2019-04-27 PROCEDURE — 94761 N-INVAS EAR/PLS OXIMETRY MLT: CPT

## 2019-04-27 PROCEDURE — 85025 COMPLETE CBC W/AUTO DIFF WBC: CPT

## 2019-04-27 PROCEDURE — 99900035 HC TECH TIME PER 15 MIN (STAT)

## 2019-04-27 RX ORDER — HYDROMORPHONE HYDROCHLORIDE 1 MG/ML
1 INJECTION, SOLUTION INTRAMUSCULAR; INTRAVENOUS; SUBCUTANEOUS ONCE
Status: COMPLETED | OUTPATIENT
Start: 2019-04-27 | End: 2019-04-27

## 2019-04-27 RX ORDER — DIPHENHYDRAMINE HCL 25 MG
25 CAPSULE ORAL EVERY 6 HOURS PRN
Status: DISCONTINUED | OUTPATIENT
Start: 2019-04-27 | End: 2019-04-27

## 2019-04-27 RX ORDER — POTASSIUM CHLORIDE 20 MEQ/1
40 TABLET, EXTENDED RELEASE ORAL
Status: COMPLETED | OUTPATIENT
Start: 2019-04-27 | End: 2019-04-27

## 2019-04-27 RX ORDER — SODIUM CHLORIDE 450 MG/100ML
INJECTION, SOLUTION INTRAVENOUS CONTINUOUS
Status: DISCONTINUED | OUTPATIENT
Start: 2019-04-27 | End: 2019-05-04 | Stop reason: HOSPADM

## 2019-04-27 RX ORDER — IPRATROPIUM BROMIDE AND ALBUTEROL SULFATE 2.5; .5 MG/3ML; MG/3ML
3 SOLUTION RESPIRATORY (INHALATION) ONCE
Status: DISCONTINUED | OUTPATIENT
Start: 2019-04-27 | End: 2019-04-27

## 2019-04-27 RX ORDER — DIPHENHYDRAMINE HCL 25 MG
25 CAPSULE ORAL EVERY 6 HOURS PRN
Status: DISCONTINUED | OUTPATIENT
Start: 2019-04-27 | End: 2019-05-04 | Stop reason: HOSPADM

## 2019-04-27 RX ADMIN — DIPHENHYDRAMINE HYDROCHLORIDE 25 MG: 25 CAPSULE ORAL at 12:04

## 2019-04-27 RX ADMIN — AMLODIPINE BESYLATE 10 MG: 10 TABLET ORAL at 08:04

## 2019-04-27 RX ADMIN — FLUOXETINE 40 MG: 20 CAPSULE ORAL at 08:04

## 2019-04-27 RX ADMIN — IPRATROPIUM BROMIDE AND ALBUTEROL SULFATE 3 ML: .5; 3 SOLUTION RESPIRATORY (INHALATION) at 08:04

## 2019-04-27 RX ADMIN — AZITHROMYCIN 500 MG: 250 TABLET, FILM COATED ORAL at 06:04

## 2019-04-27 RX ADMIN — ENOXAPARIN SODIUM 40 MG: 100 INJECTION SUBCUTANEOUS at 09:04

## 2019-04-27 RX ADMIN — ALBUTEROL SULFATE 2.5 MG: 2.5 SOLUTION RESPIRATORY (INHALATION) at 02:04

## 2019-04-27 RX ADMIN — HYDROMORPHONE HYDROCHLORIDE 1 MG: 1 INJECTION, SOLUTION INTRAMUSCULAR; INTRAVENOUS; SUBCUTANEOUS at 10:04

## 2019-04-27 RX ADMIN — CARBAMAZEPINE 800 MG: 200 TABLET ORAL at 09:04

## 2019-04-27 RX ADMIN — GABAPENTIN 800 MG: 400 CAPSULE ORAL at 08:04

## 2019-04-27 RX ADMIN — PREDNISONE 40 MG: 20 TABLET ORAL at 08:04

## 2019-04-27 RX ADMIN — POTASSIUM CHLORIDE 40 MEQ: 1500 TABLET, EXTENDED RELEASE ORAL at 08:04

## 2019-04-27 RX ADMIN — ACETAMINOPHEN 1000 MG: 500 TABLET ORAL at 11:04

## 2019-04-27 RX ADMIN — SODIUM CHLORIDE: 0.45 INJECTION, SOLUTION INTRAVENOUS at 02:04

## 2019-04-27 RX ADMIN — DIPHENHYDRAMINE HYDROCHLORIDE 12.5 MG: 50 INJECTION INTRAMUSCULAR; INTRAVENOUS at 08:04

## 2019-04-27 RX ADMIN — HYDROMORPHONE HYDROCHLORIDE 1 MG: 1 INJECTION, SOLUTION INTRAMUSCULAR; INTRAVENOUS; SUBCUTANEOUS at 02:04

## 2019-04-27 RX ADMIN — GUAIFENESIN AND DEXTROMETHORPHAN HYDROBROMIDE 1 TABLET: 600; 30 TABLET, EXTENDED RELEASE ORAL at 10:04

## 2019-04-27 RX ADMIN — DIPHENHYDRAMINE HYDROCHLORIDE 12.5 MG: 50 INJECTION INTRAMUSCULAR; INTRAVENOUS at 03:04

## 2019-04-27 RX ADMIN — HYDROMORPHONE HYDROCHLORIDE 1 MG: 1 INJECTION, SOLUTION INTRAMUSCULAR; INTRAVENOUS; SUBCUTANEOUS at 11:04

## 2019-04-27 RX ADMIN — STANDARDIZED SENNA CONCENTRATE AND DOCUSATE SODIUM 1 TABLET: 8.6; 5 TABLET, FILM COATED ORAL at 09:04

## 2019-04-27 RX ADMIN — ENOXAPARIN SODIUM 40 MG: 100 INJECTION SUBCUTANEOUS at 08:04

## 2019-04-27 RX ADMIN — GUAIFENESIN AND DEXTROMETHORPHAN HYDROBROMIDE 1 TABLET: 600; 30 TABLET, EXTENDED RELEASE ORAL at 11:04

## 2019-04-27 RX ADMIN — ACETAMINOPHEN 1000 MG: 500 TABLET ORAL at 06:04

## 2019-04-27 RX ADMIN — GABAPENTIN 800 MG: 400 CAPSULE ORAL at 09:04

## 2019-04-27 RX ADMIN — DIPHENHYDRAMINE HYDROCHLORIDE 25 MG: 25 CAPSULE ORAL at 06:04

## 2019-04-27 RX ADMIN — STANDARDIZED SENNA CONCENTRATE AND DOCUSATE SODIUM 1 TABLET: 8.6; 5 TABLET, FILM COATED ORAL at 08:04

## 2019-04-27 RX ADMIN — OXYCODONE HYDROCHLORIDE 10 MG: 10 TABLET ORAL at 12:04

## 2019-04-27 RX ADMIN — POTASSIUM CHLORIDE 40 MEQ: 1500 TABLET, EXTENDED RELEASE ORAL at 11:04

## 2019-04-27 RX ADMIN — OXYCODONE HYDROCHLORIDE 10 MG: 10 TABLET ORAL at 09:04

## 2019-04-27 RX ADMIN — IPRATROPIUM BROMIDE AND ALBUTEROL SULFATE 3 ML: .5; 3 SOLUTION RESPIRATORY (INHALATION) at 11:04

## 2019-04-27 RX ADMIN — HYDROMORPHONE HYDROCHLORIDE 1 MG: 1 INJECTION, SOLUTION INTRAMUSCULAR; INTRAVENOUS; SUBCUTANEOUS at 09:04

## 2019-04-27 RX ADMIN — ALBUTEROL SULFATE 2.5 MG: 2.5 SOLUTION RESPIRATORY (INHALATION) at 09:04

## 2019-04-27 RX ADMIN — HYDROMORPHONE HYDROCHLORIDE 1 MG: 1 INJECTION, SOLUTION INTRAMUSCULAR; INTRAVENOUS; SUBCUTANEOUS at 06:04

## 2019-04-27 RX ADMIN — IPRATROPIUM BROMIDE AND ALBUTEROL SULFATE 3 ML: .5; 3 SOLUTION RESPIRATORY (INHALATION) at 07:04

## 2019-04-27 NOTE — HOSPITAL COURSE
Patient admitted to hospital medicine for sickle cell pain crisis and shortness of breath. Initially, patient was treated with prn dilaudid and oral oxycodone. She was also started on continuous IVF in the form of lactate ringers and supplemental O2 as needed. Patient still complains of pain, although she primarily uses dilaudid and not much of her oral medication. There was some concern for acute chest, however heme/onc was consulted, reports that this is unlikely to be acute chest syndrome. On the night on 4/27, patient had an infiltrated IV when getting contrast for CT scan. Right arm is swollen, but pulses are present, full ROM, and sensation is intact. This was treated with elevation and alternating hot and cold packs. Swelling on the right forearm reduced considerably. In addition, sputum cultures were collected showing moderate gram negative rods, few gram positive cocci and rare gram positive rods. Changed antibiotics to Levaquin.

## 2019-04-27 NOTE — SUBJECTIVE & OBJECTIVE
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      Current Outpatient Medications on File Prior to Encounter   Medication Sig    acetaminophen (TYLENOL EXTRA STRENGTH) 500 MG tablet Take 1,000 mg by mouth daily as needed for Pain.    albuterol (VENTOLIN HFA) 90 mcg/actuation inhaler Inhale 2 puffs into the lungs every 6 (six) hours as needed for Wheezing. Rescue    amLODIPine (NORVASC) 10 MG tablet Take 1 tablet (10 mg total) by mouth once daily.    ascorbic acid, vitamin C, (VITAMIN C) 500 MG tablet Take 500 mg by mouth once daily.    calcium carbonate (TUMS) 200 mg calcium (500 mg) chewable tablet Take 1 tablet by mouth daily as needed for Heartburn (stomach).    carBAMazepine (TEGRETOL) 200 mg tablet Take 4 tablets (800 mg total) by mouth 2 (two) times daily.    cyclobenzaprine (FLEXERIL) 10 MG tablet Take 1 tablet (10 mg total) by mouth 3 (three) times daily as needed.    ergocalciferol (VITAMIN D2) 50,000 unit Cap Take 1 capsule (50,000 Units total) by mouth every 7 days. (Patient taking differently: Take 50,000 Units by mouth every 7 days. Tuesday)    FLUoxetine 40 MG capsule TAKE 1 CAPSULE BY MOUTH EVERY DAY    fluticasone (FLONASE) 50 mcg/actuation nasal spray 2 sprays (100 mcg total) by Each Nare route once daily.    gabapentin (NEURONTIN) 800 MG  tablet TAKE 1 TABLET (800 MG TOTAL) BY MOUTH 3 (THREE) TIMES DAILY. (Patient taking differently: Take 800 mg by mouth 2 (two) times daily. )    hydroCHLOROthiazide (HYDRODIURIL) 25 MG tablet Take 1 tablet (25 mg total) by mouth once daily.    ondansetron (ZOFRAN-ODT) 8 MG TbDL Take 1 tablet (8 mg total) by mouth every 6 (six) hours as needed.    oxyCODONE-acetaminophen (PERCOCET)  mg per tablet Take 1 tablet by mouth every 4 (four) hours as needed for Pain.    prochlorperazine (COMPAZINE) 10 MG tablet Take 1 tablet (10 mg total) by mouth every 6 (six) hours as needed.    promethazine (PHENERGAN) 6.25 mg/5 mL syrup Take 20 mLs (25 mg total) by mouth every 6 (six) hours as needed for Nausea.    senna-docusate 8.6-50 mg (PERICOLACE) 8.6-50 mg per tablet Take 1 tablet by mouth 2 (two) times daily. (Patient taking differently: Take 1 tablet by mouth 2 (two) times daily as needed for Constipation. )    albuterol (ACCUNEB) 0.63 mg/3 mL Nebu Take 3 mLs (0.63 mg total) by nebulization every 6 (six) hours as needed (for wheezing/shortness of breath). Rescue    clonazePAM (KLONOPIN) 2 MG Tab Take 1 tablet (2 mg total) by mouth once daily.    hydrOXYzine HCl (ATARAX) 25 MG tablet      Family History     Problem Relation (Age of Onset)    Diabetes Mother    Heart disease Mother, Father        Tobacco Use    Smoking status: Never Smoker    Smokeless tobacco: Never Used   Substance and Sexual Activity    Alcohol use: No    Drug use: No    Sexual activity: Not Currently     Birth control/protection: See Surgical Hx     Review of Systems   Constitutional: Positive for chills and fever. Negative for unexpected weight change.   HENT: Negative for rhinorrhea, sinus pressure, sinus pain, sneezing and sore throat.    Eyes: Negative for visual disturbance.   Respiratory: Positive for cough (productive of white sputum), chest tightness and wheezing. Negative for shortness of breath.    Cardiovascular: Positive for chest  pain and leg swelling. Negative for palpitations.   Gastrointestinal: Negative for abdominal pain, constipation, diarrhea, nausea and vomiting.   Genitourinary: Negative for difficulty urinating, dysuria and flank pain.   Musculoskeletal: Positive for arthralgias and myalgias. Negative for neck stiffness.   Skin: Negative for pallor and rash.   Neurological: Positive for headaches. Negative for seizures, syncope and weakness.   Hematological: Negative for adenopathy.     Objective:     Vital Signs (Most Recent):  Temp: 98.9 °F (37.2 °C) (04/27/19 1119)  Pulse: 101 (04/27/19 1140)  Resp: 20 (04/27/19 1140)  BP: 136/69 (04/27/19 0900)  SpO2: (!) 91 % (04/27/19 1140) Vital Signs (24h Range):  Temp:  [97.2 °F (36.2 °C)-99.4 °F (37.4 °C)] 98.9 °F (37.2 °C)  Pulse:  [] 101  Resp:  [15-22] 20  SpO2:  [86 %-100 %] 91 %  BP: (111-136)/(59-69) 136/69     Weight: 136.1 kg (300 lb)  Body mass index is 54.87 kg/m².    Physical Exam   Constitutional: She is oriented to person, place, and time. She appears well-developed and well-nourished. No distress.   HENT:   Head: Normocephalic and atraumatic.   Mouth/Throat: No oropharyngeal exudate.   Eyes: Pupils are equal, round, and reactive to light. EOM are normal. No scleral icterus.   Neck: Normal range of motion. Neck supple.   Cardiovascular: Normal rate, regular rhythm, normal heart sounds and intact distal pulses. Exam reveals no friction rub.   No murmur heard.  Pulmonary/Chest: Effort normal. No respiratory distress. She has wheezes (left lung fields).   Abdominal: Soft. Bowel sounds are normal. She exhibits no distension. There is no tenderness. There is no guarding.   Musculoskeletal: Normal range of motion. She exhibits tenderness (bilateral knees and ankles). She exhibits no edema.   Lymphadenopathy:     She has no cervical adenopathy.   Neurological: She is oriented to person, place, and time. No cranial nerve deficit or sensory deficit.   Skin: Skin is warm and dry.  No rash noted. No erythema. No pallor.   Nursing note and vitals reviewed.        CRANIAL NERVES     CN III, IV, VI   Pupils are equal, round, and reactive to light.  Extraocular motions are normal.        Significant Labs:   CBC:   Recent Labs   Lab 04/25/19 1845 04/26/19 0342 04/27/19  0712   WBC 8.25 7.90 5.44   HGB 11.8* 10.4* 10.0*   HCT 35.8* 32.5* 30.6*    189 184     CMP:   Recent Labs   Lab 04/25/19 1845 04/26/19 0342 04/27/19  0712    137 138   K 3.4* 3.3* 3.3*    105 104   CO2 25 24 26    116* 98   BUN 6 6 7   CREATININE 0.8 0.8 0.7   CALCIUM 9.8 8.8 8.8   PROT 8.5* 7.3 7.0   ALBUMIN 4.0 3.5 3.2*   BILITOT 0.6 0.5 0.3   ALKPHOS 105 86 73   AST 17 18 18   ALT 10 9* 10   ANIONGAP 11 8 8   EGFRNONAA >60.0 >60.0 >60.0     All pertinent labs within the past 24 hours have been reviewed.      Significant Imaging: I have reviewed all imaging in the last 24 hours

## 2019-04-27 NOTE — PLAN OF CARE
Problem: Adult Inpatient Plan of Care  Goal: Patient-Specific Goal (Individualization)  Outcome: Ongoing (interventions implemented as appropriate)  Pt free of falls and injury. Pt AAOx4.Pain managed with Dilaudid q 4 hrs and Oxycodone x1.Complained of itching, PRN Benadryl IV push administered x2. Patient complained of respiratory discomfort, PRN breathing treatments administered by Respiratory Technician x3.Bed low, breaks locked, SR up x2, call light within reach, will continue to monitor.   04/27/19 0449   Patient-Specific Goal (Individualization)   Patient-Specific Goals (Include Timeframe) Pt free of respiratory distress   OTHER   Individualized Care Needs Pt free of pain       04/27/19 0449   Patient-Specific Goal (Individualization)   Patient-Specific Goals (Include Timeframe) Pt free of respiratory distress   OTHER      04/27/19 0449   Patient-Specific Goal (Individualization)   Patient-Specific Goals (Include Timeframe) Pt free of respiratory distress   OTHER   Individualized Care Needs Pt free of pain

## 2019-04-27 NOTE — PLAN OF CARE
Problem: Occupational Therapy Goal  Goal: Occupational Therapy Goal  Outcome: Outcome(s) achieved Date Met: 04/27/19  Eval and D/C - no needs.    EMELY Hawthorne

## 2019-04-27 NOTE — PT/OT/SLP EVAL
Occupational Therapy   Evaluation and Discharge Note    Name: Nazanin Malone  MRN: [id number]  Admitting Diagnosis:  Sickle cell disease, type S beta-zero thalassemia, with acute chest syndrome      Recommendations:     Discharge Recommendations: home  Discharge Equipment Recommendations:  none  Barriers to discharge:       Assessment:     Nazanin Malone is a 39 y.o. female with a medical diagnosis of Sickle cell disease, type S beta-zero thalassemia, with acute chest syndrome. At this time, patient is functioning at their prior level of function and does not require further acute OT services.     **Walked w/o O2 and sats dropped to 87% and HR increased to 130.    Plan:     During this hospitalization, patient does not require further acute OT services.  Please re-consult if situation changes.    · Plan of Care Reviewed with: patient    Subjective     Occupational Profile:  Living Environment: Pt lives with her mother/2 daughters in an elevated house with 15 steps/BHRs to enter.  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  Patient found supine with oxygen upon OT entry to room.    General Precautions: Standard,     Orthopedic Precautions:    Braces:       Occupational Performance:    Bed Mobility:    · Independent    Functional Mobility/Transfers:  Independent including doing 1 flight of stairs but pt SOB after walking.    Activities of Daily Living:  Independent    Cognitive/Visual Perceptual:  Cognitive/Psychosocial Skills:     -       Oriented to: Person, Place, Time and Situation   -       Safety awareness/insight to disability: intact     Physical Exam:  BUE AROM/MMT: WNL    [facility] 6 Click  ADL:  AMPAC Total Score: 24    Treatment & Education:  UE ROM/MMT  Bed mobility training / assessment  Functional mobility assessment  Sit/standing balance assessment  Educated on importance of sitting OOB in bedside chair to promote increased strength, endurance & breathing.  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

## 2019-04-28 PROBLEM — M79.89 FOREARM SWELLING: Status: ACTIVE | Noted: 2019-04-28

## 2019-04-28 LAB
ALBUMIN SERPL BCP-MCNC: 3.2 G/DL (ref 3.5–5.2)
ALLENS TEST: ABNORMAL
ALP SERPL-CCNC: 71 U/L (ref 55–135)
ALT SERPL W/O P-5'-P-CCNC: 14 U/L (ref 10–44)
ANION GAP SERPL CALC-SCNC: 10 MMOL/L (ref 8–16)
AST SERPL-CCNC: 21 U/L (ref 10–40)
BASOPHILS # BLD AUTO: 0.03 K/UL (ref 0–0.2)
BASOPHILS NFR BLD: 0.5 % (ref 0–1.9)
BILIRUB SERPL-MCNC: 0.3 MG/DL (ref 0.1–1)
BUN SERPL-MCNC: 5 MG/DL (ref 6–20)
CALCIUM SERPL-MCNC: 8.3 MG/DL (ref 8.7–10.5)
CHLORIDE SERPL-SCNC: 103 MMOL/L (ref 95–110)
CO2 SERPL-SCNC: 21 MMOL/L (ref 23–29)
CREAT SERPL-MCNC: 0.7 MG/DL (ref 0.5–1.4)
DELSYS: ABNORMAL
DIFFERENTIAL METHOD: ABNORMAL
EOSINOPHIL # BLD AUTO: 0.2 K/UL (ref 0–0.5)
EOSINOPHIL NFR BLD: 3.8 % (ref 0–8)
ERYTHROCYTE [DISTWIDTH] IN BLOOD BY AUTOMATED COUNT: 17 % (ref 11.5–14.5)
EST. GFR  (AFRICAN AMERICAN): >60 ML/MIN/1.73 M^2
EST. GFR  (NON AFRICAN AMERICAN): >60 ML/MIN/1.73 M^2
FIO2: 28
FLOW: 2
GLUCOSE SERPL-MCNC: 79 MG/DL (ref 70–110)
HCO3 UR-SCNC: 28.1 MMOL/L (ref 24–28)
HCT VFR BLD AUTO: 31.1 % (ref 37–48.5)
HGB BLD-MCNC: 9.8 G/DL (ref 12–16)
IMM GRANULOCYTES # BLD AUTO: 0.03 K/UL (ref 0–0.04)
IMM GRANULOCYTES NFR BLD AUTO: 0.5 % (ref 0–0.5)
LYMPHOCYTES # BLD AUTO: 1.9 K/UL (ref 1–4.8)
LYMPHOCYTES NFR BLD: 33.4 % (ref 18–48)
MCH RBC QN AUTO: 23.2 PG (ref 27–31)
MCHC RBC AUTO-ENTMCNC: 31.5 G/DL (ref 32–36)
MCV RBC AUTO: 74 FL (ref 82–98)
MODE: ABNORMAL
MONOCYTES # BLD AUTO: 0.6 K/UL (ref 0.3–1)
MONOCYTES NFR BLD: 10.3 % (ref 4–15)
NEUTROPHILS # BLD AUTO: 2.9 K/UL (ref 1.8–7.7)
NEUTROPHILS NFR BLD: 51.5 % (ref 38–73)
NRBC BLD-RTO: 1 /100 WBC
PCO2 BLDA: 48.4 MMHG (ref 35–45)
PH SMN: 7.37 [PH] (ref 7.35–7.45)
PLATELET # BLD AUTO: 160 K/UL (ref 150–350)
PMV BLD AUTO: 9.5 FL (ref 9.2–12.9)
PO2 BLDA: 60 MMHG (ref 80–100)
POC BE: 3 MMOL/L
POC SATURATED O2: 90 % (ref 95–100)
POC TCO2: 30 MMOL/L (ref 23–27)
POTASSIUM SERPL-SCNC: 3.3 MMOL/L (ref 3.5–5.1)
PROT SERPL-MCNC: 7 G/DL (ref 6–8.4)
RBC # BLD AUTO: 4.23 M/UL (ref 4–5.4)
SAMPLE: ABNORMAL
SITE: ABNORMAL
SODIUM SERPL-SCNC: 134 MMOL/L (ref 136–145)
SP02: 94
WBC # BLD AUTO: 5.72 K/UL (ref 3.9–12.7)

## 2019-04-28 PROCEDURE — 36600 WITHDRAWAL OF ARTERIAL BLOOD: CPT

## 2019-04-28 PROCEDURE — 94640 AIRWAY INHALATION TREATMENT: CPT

## 2019-04-28 PROCEDURE — 25000003 PHARM REV CODE 250: Performed by: STUDENT IN AN ORGANIZED HEALTH CARE EDUCATION/TRAINING PROGRAM

## 2019-04-28 PROCEDURE — 11000001 HC ACUTE MED/SURG PRIVATE ROOM

## 2019-04-28 PROCEDURE — 99233 SBSQ HOSP IP/OBS HIGH 50: CPT | Mod: ,,, | Performed by: INTERNAL MEDICINE

## 2019-04-28 PROCEDURE — 27000221 HC OXYGEN, UP TO 24 HOURS

## 2019-04-28 PROCEDURE — 63600175 PHARM REV CODE 636 W HCPCS: Performed by: STUDENT IN AN ORGANIZED HEALTH CARE EDUCATION/TRAINING PROGRAM

## 2019-04-28 PROCEDURE — 99233 PR SUBSEQUENT HOSPITAL CARE,LEVL III: ICD-10-PCS | Mod: ,,, | Performed by: INTERNAL MEDICINE

## 2019-04-28 PROCEDURE — 36415 COLL VENOUS BLD VENIPUNCTURE: CPT

## 2019-04-28 PROCEDURE — 80053 COMPREHEN METABOLIC PANEL: CPT

## 2019-04-28 PROCEDURE — 99900035 HC TECH TIME PER 15 MIN (STAT)

## 2019-04-28 PROCEDURE — 94667 MNPJ CHEST WALL 1ST: CPT

## 2019-04-28 PROCEDURE — 94761 N-INVAS EAR/PLS OXIMETRY MLT: CPT

## 2019-04-28 PROCEDURE — 94664 DEMO&/EVAL PT USE INHALER: CPT

## 2019-04-28 PROCEDURE — 82803 BLOOD GASES ANY COMBINATION: CPT

## 2019-04-28 PROCEDURE — 25000242 PHARM REV CODE 250 ALT 637 W/ HCPCS: Performed by: STUDENT IN AN ORGANIZED HEALTH CARE EDUCATION/TRAINING PROGRAM

## 2019-04-28 PROCEDURE — 85025 COMPLETE CBC W/AUTO DIFF WBC: CPT

## 2019-04-28 RX ORDER — LORAZEPAM 0.5 MG/1
0.5 TABLET ORAL ONCE
Status: COMPLETED | OUTPATIENT
Start: 2019-04-28 | End: 2019-04-28

## 2019-04-28 RX ORDER — AMOXICILLIN AND CLAVULANATE POTASSIUM 875; 125 MG/1; MG/1
1 TABLET, FILM COATED ORAL EVERY 12 HOURS
Status: DISCONTINUED | OUTPATIENT
Start: 2019-04-29 | End: 2019-04-30

## 2019-04-28 RX ORDER — POTASSIUM CHLORIDE 20 MEQ/1
40 TABLET, EXTENDED RELEASE ORAL
Status: DISPENSED | OUTPATIENT
Start: 2019-04-28 | End: 2019-04-28

## 2019-04-28 RX ADMIN — ENOXAPARIN SODIUM 40 MG: 100 INJECTION SUBCUTANEOUS at 10:04

## 2019-04-28 RX ADMIN — GUAIFENESIN AND DEXTROMETHORPHAN HYDROBROMIDE 1 TABLET: 600; 30 TABLET, EXTENDED RELEASE ORAL at 10:04

## 2019-04-28 RX ADMIN — DIPHENHYDRAMINE HYDROCHLORIDE 25 MG: 25 CAPSULE ORAL at 12:04

## 2019-04-28 RX ADMIN — STANDARDIZED SENNA CONCENTRATE AND DOCUSATE SODIUM 1 TABLET: 8.6; 5 TABLET, FILM COATED ORAL at 10:04

## 2019-04-28 RX ADMIN — AMLODIPINE BESYLATE 10 MG: 10 TABLET ORAL at 09:04

## 2019-04-28 RX ADMIN — DIPHENHYDRAMINE HYDROCHLORIDE 25 MG: 25 CAPSULE ORAL at 10:04

## 2019-04-28 RX ADMIN — HYDROMORPHONE HYDROCHLORIDE 1 MG: 1 INJECTION, SOLUTION INTRAMUSCULAR; INTRAVENOUS; SUBCUTANEOUS at 10:04

## 2019-04-28 RX ADMIN — GUAIFENESIN AND DEXTROMETHORPHAN HYDROBROMIDE 1 TABLET: 600; 30 TABLET, EXTENDED RELEASE ORAL at 09:04

## 2019-04-28 RX ADMIN — GABAPENTIN 800 MG: 400 CAPSULE ORAL at 09:04

## 2019-04-28 RX ADMIN — CEFTRIAXONE 2 G: 2 INJECTION, SOLUTION INTRAVENOUS at 11:04

## 2019-04-28 RX ADMIN — SODIUM CHLORIDE: 0.45 INJECTION, SOLUTION INTRAVENOUS at 04:04

## 2019-04-28 RX ADMIN — CEFTRIAXONE 2 G: 2 INJECTION, SOLUTION INTRAVENOUS at 12:04

## 2019-04-28 RX ADMIN — DIPHENHYDRAMINE HYDROCHLORIDE 25 MG: 25 CAPSULE ORAL at 09:04

## 2019-04-28 RX ADMIN — IPRATROPIUM BROMIDE AND ALBUTEROL SULFATE 3 ML: .5; 3 SOLUTION RESPIRATORY (INHALATION) at 07:04

## 2019-04-28 RX ADMIN — HYDROMORPHONE HYDROCHLORIDE 1 MG: 1 INJECTION, SOLUTION INTRAMUSCULAR; INTRAVENOUS; SUBCUTANEOUS at 06:04

## 2019-04-28 RX ADMIN — ACETAMINOPHEN 1000 MG: 500 TABLET ORAL at 06:04

## 2019-04-28 RX ADMIN — HYDROMORPHONE HYDROCHLORIDE 1 MG: 1 INJECTION, SOLUTION INTRAMUSCULAR; INTRAVENOUS; SUBCUTANEOUS at 02:04

## 2019-04-28 RX ADMIN — FLUOXETINE 40 MG: 20 CAPSULE ORAL at 09:04

## 2019-04-28 RX ADMIN — AZITHROMYCIN 500 MG: 250 TABLET, FILM COATED ORAL at 06:04

## 2019-04-28 RX ADMIN — CARBAMAZEPINE 800 MG: 200 TABLET ORAL at 09:04

## 2019-04-28 RX ADMIN — GABAPENTIN 800 MG: 400 CAPSULE ORAL at 10:04

## 2019-04-28 RX ADMIN — ACETAMINOPHEN 1000 MG: 500 TABLET ORAL at 12:04

## 2019-04-28 RX ADMIN — PREDNISONE 40 MG: 20 TABLET ORAL at 09:04

## 2019-04-28 RX ADMIN — ENOXAPARIN SODIUM 40 MG: 100 INJECTION SUBCUTANEOUS at 09:04

## 2019-04-28 RX ADMIN — ACETAMINOPHEN 1000 MG: 500 TABLET ORAL at 11:04

## 2019-04-28 RX ADMIN — IPRATROPIUM BROMIDE AND ALBUTEROL SULFATE 3 ML: .5; 3 SOLUTION RESPIRATORY (INHALATION) at 04:04

## 2019-04-28 RX ADMIN — OXYCODONE HYDROCHLORIDE 10 MG: 10 TABLET ORAL at 09:04

## 2019-04-28 RX ADMIN — ALBUTEROL SULFATE 2.5 MG: 2.5 SOLUTION RESPIRATORY (INHALATION) at 03:04

## 2019-04-28 RX ADMIN — POTASSIUM CHLORIDE 40 MEQ: 1500 TABLET, EXTENDED RELEASE ORAL at 04:04

## 2019-04-28 RX ADMIN — IPRATROPIUM BROMIDE AND ALBUTEROL SULFATE 3 ML: .5; 3 SOLUTION RESPIRATORY (INHALATION) at 11:04

## 2019-04-28 RX ADMIN — LORAZEPAM 0.5 MG: 0.5 TABLET ORAL at 06:04

## 2019-04-28 RX ADMIN — CARBAMAZEPINE 800 MG: 200 TABLET ORAL at 10:04

## 2019-04-28 RX ADMIN — STANDARDIZED SENNA CONCENTRATE AND DOCUSATE SODIUM 1 TABLET: 8.6; 5 TABLET, FILM COATED ORAL at 09:04

## 2019-04-28 RX ADMIN — OXYCODONE HYDROCHLORIDE 10 MG: 10 TABLET ORAL at 03:04

## 2019-04-28 NOTE — PROGRESS NOTES
"Patient's right arm was examined again today after having contrast extravasation into her soft tissues during her CTA. Her right arm remains noticeably more swollen compared to the left; however, patient reports that it has mildly improved over the course of today.  She continues to have pain characterized as a "soreness" to the lateral aspect of her right arm near her elbow, although it has improved. Her distal pulses are intact, and she can move her fingers without difficulty.  Denies any numbness or tingling to her right upper extremity.  Patient was instructed to notify nurse and/or MD immediately if she experiences worsening in symptoms such as increased pain, swelling, numbness or tingling. Please do not hesitate to contact radiology with questions or concerns.     CT was read by Dr. Mata.  Dr. Torrez was not involved with the examination during which the extravasation occurred and has not examined the patient.     Camryn Briones MD   Department of Radiology  PGY II Resident  Pager: [phone]    "

## 2019-04-28 NOTE — PROGRESS NOTES
Called CT department for pt asking when the CT will be done.  Pt was told to stay NPO and has remained NPO since 12:30pm.  CT department has a lot of STAT orders, but still plans on taking pt to CT this evening.  Pt notified.  Verbalizes she wants to eat and is getting hungry.

## 2019-04-28 NOTE — PROGRESS NOTES
[facility] Hospital Medicine  Progress Note    Patient Name: Nazanin Malone  MRN: [id number]  Patient Class: IP- Inpatient   Admission Date: 4/25/2019  Length of Stay: 3 days  Attending Physician: Cortney Arellano MD  Primary Care Provider: Balta Forbes MD    [facility] Medicine Team: [facility] HOSP MED 5 Raymond Aparicio MD    Subjective:     Principal Problem:Sickle cell disease, type S beta-zero thalassemia, with acute chest syndrome    HPI:  Ms. Malone is a 37 year old lady with a past medical history significant for sickle cell beta-thalassemia, asthma, HTN, and trigeminal neuralgia who presents to [facility] ED with complaints of cough, shortness of breath, and body pain. The patient states symptoms began this morning with a headache. She took home dose of percocet 10mg that did not help her symptoms. She then began to notice bilateral leg and toe, bilateral hip, left arm, and chest pain with breathing which prompted her to come in to the ED for further evaluation. The patient also endorsed associated shortness of breath. She endorses a cough productive of thick white sputum that began overnight as well as fevers (103.1) and chills at home. 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 She endorses good PO intake. She has a history of asthma and tried her albuterol rescue inhaler that did not improve her shortness of breath. The patient is currently on her menstrual cycle and she endorses heavy flow with associated clots which is normal for her. She states that her sickle cell often flares when she is on her cycle. She reports one previous episode of acute chest syndrome in 2017.    The patient follows with Hematology at [facility] for her Sickle beta-thalassemia, last seen in clinic on 1/9/19. Her baseline Hg is 11.8-12.0 and she states she has only ever needed a transfusion when she was 13 years old or so. She had previously required iron infusions in the  past, most recently in July 2018 but has not needed them since. She was previously on hydroxyurea but stopped the medication on her own due to complaints of rash. She was offered L-glutamine but it was not covered by insurance.    In the ED the patient was febrile and required IV narcotics for pain relief. CXR did not show any focal consolidation. She O2 sat was 99% on room air. Given fever and chest pain she was given 1x dose of rocefin and IVFs She was admitted to hospital medicine for sickle cell pain crisis.      Hospital Course:  Patient admitted to hospital medicine for sickle cell pain crisis and shortness of breath. Initially, patient was treated with prn dilaudid and oral oxycodone. She was also started on continuous IVF in the form of lactate ringers and supplemental O2 as needed. Patient still complains of pain, although she primarily uses dilaudid and not much of her oral medication. There was some concern for acute chest, however heme/onc was consulted, reports that this is unlikely to be acute chest syndrome. On the night on 4/27, patient had an infiltrated IV when getting contrast for CT scan. Right arm is swollen, but pulses are present, full ROM, and sensation is intact. Will alternate hot and cold packs and elevate extremity. Continue supportive treatment.      Interval History: Patient had infiltrated IV overnight which leaked 50 ml to 100 ml of contrast into patient's right forearm. Forearm is swollen, but pulses are present, sensation intact, and ROM normal. U/S upper extremity is negative.     Review of Systems   Constitutional: Negative for fever.   Respiratory: Positive for cough and shortness of breath.    Cardiovascular: Positive for chest pain. Negative for palpitations.   Gastrointestinal: Negative for abdominal pain.   Musculoskeletal: Positive for arthralgias and back pain.     Objective:     Vital Signs (Most Recent):  Temp: 97.7 °F (36.5 °C) (04/28/19 1156)  Pulse: 98 (04/28/19  1156)  Resp: 20 (04/28/19 1156)  BP: (!) 167/102 (04/28/19 1156)  SpO2: 98 % (04/28/19 1156) Vital Signs (24h Range):  Temp:  [97.7 °F (36.5 °C)-99.2 °F (37.3 °C)] 97.7 °F (36.5 °C)  Pulse:  [] 98  Resp:  [18-24] 20  SpO2:  [95 %-100 %] 98 %  BP: (135-173)/() 167/102     Weight: 136.1 kg (300 lb)  Body mass index is 54.87 kg/m².  No intake or output data in the 24 hours ending 04/28/19 1219   Physical Exam   Constitutional: She is oriented to person, place, and time. She appears well-developed and well-nourished. No distress.   HENT:   Head: Normocephalic and atraumatic.   Mouth/Throat: No oropharyngeal exudate.   Eyes: Pupils are equal, round, and reactive to light. EOM are normal. No scleral icterus.   Neck: Normal range of motion. Neck supple.   Cardiovascular: Normal rate, regular rhythm, normal heart sounds and intact distal pulses. Exam reveals no friction rub.   No murmur heard.  Pulmonary/Chest: Effort normal. No respiratory distress. She has wheezes (left lung fields).   Abdominal: Soft. Bowel sounds are normal. She exhibits no distension. There is no tenderness. There is no guarding.   Musculoskeletal: Normal range of motion. She exhibits tenderness (bilateral knees and ankles). She exhibits no edema.   Lymphadenopathy:     She has no cervical adenopathy.   Neurological: She is oriented to person, place, and time. No cranial nerve deficit or sensory deficit.   Skin: Skin is warm and dry. No rash noted. No erythema. No pallor.   Nursing note and vitals reviewed.      Significant Labs: All pertinent labs within the past 24 hours have been reviewed.    Significant Imaging: I have reviewed all pertinent imaging results/findings within the past 24 hours.    Assessment/Plan:      * Sickle cell disease, type S beta-zero thalassemia, with acute chest syndrome  Patient with history of Sickle cell-beta thalassemia, presenting with acute pain crisis. Did not respond to outpatient PO oxy 10mg. Patient  "reporting fevers, chills, and cough night before presentation. Cough productive of white sputum, patient febrile and tachycardic on presentation. Flu negative. Works as a nurse, was caring for multiple patients with pneumonia. CXR negative for consolidation, however given history and chest pain was given dose of ceftriaxone in ED.  Patient also currently on menstrual period and she reports having significant, heavy flow. Likely exacerbating symptoms. Reticulocyte count 2.7. Patient not on hydroxyurea due to side effects.    Baseline Hg 11.8-12.0 per chart review, at baseline on presentation today. Given fevers, chest pain, and cough concerning for acute chest.   -Assessed by Heme/onc, not likely she is having acute sickle cell crisis given low retic count & stable hb, HBS quantitative in process    Plan:  - ABX: Continuing ceftriaxone and azithromycin  - Pain control: Scheduled dilauded IV 1mg q4h with 0.5mg IV prn.   - Supplemental O2, incentive spirometry and Acapella treatments ordered  - Albuterol nebs prn for wheezing  - DVT Prophylaxis: Lovenox       Forearm swelling  - Due to IV infiltration when administering contrast for CT chest  - U/S negative for clots in arterial or venous vessels  - Will continue alternating hot and cold pack with elevation. Slightly difficult as patient is getting fluid for sickle cell crisis       Mild intermittent asthma with acute exacerbation  - treating with prednisone 40 mg for 5 days       Anxiety  Patient states she has significant anxiety, describes subclinical "stroke" in past however after discussing with patient she believes it was 2/2 anxiety as her daughter had brought a stranger into their home while the patient was hospitalized. She was experiencing left arm and leg weakness, eye twitching, and difficulty speaking. Symptoms at that time resolved acutely with prn hydralazine. CT head was negative.    Previously on klonopin and remeron however she states she no longer " takes them as symptoms well controlled on fluoxetine 40mg    - Continued home fluoxetine      Morbid obesity due to excess calories  Cardiac diet.  a1c of 5.4      Benign essential HTN  Patient on Norvasc 10mg and HCTZ 25mg at home Hypertensive and tachycardic on presentation, likely 2/2 pain. Patient did not take HCTZ prior to admit.    - Resumed norvasc 10mg  - Holding HCTZ at this time as patient currently in acute vaso-occlusive crisis, would D/C as intravascular depletion could precipitate sickle cell pain crisis in future      Iron deficiency anemia  Patient with prior history of iron deficiency, presumed due to heavy menses. Last Ferritin in 1/2019 was 29. Followed by hematology, required infusion in the past, most recently in 7/2018.    Hg on admit 11.8 at baseline per chart review. MCV 69, however patient with Beta thalassemia. Ferritin would likely be elevated 2/2 acute crisis so unlikely helpful in assessing iron status at this time    - CBC daily  - Consider repeat Iron studies once acute chest resolves        VTE Risk Mitigation (From admission, onward)        Ordered     enoxaparin injection 40 mg  Every 12 hours      04/26/19 0014     IP VTE HIGH RISK PATIENT  Once      04/26/19 0014              Raymond Aparicio MD  Department of Hospital Medicine   [facility]

## 2019-04-28 NOTE — NURSING
Resp called. Pt states she can't breathe. Oxygen on 2l per nasal cannula. Spo2 97%. Oxygen increased per RT request until he reaches the room. Report given to new nurse per pt request.

## 2019-04-28 NOTE — SIGNIFICANT EVENT
"Patient evaluated in the CT scanner after tech called to report approximately 100cc contrast extravasation into the R forearm. Extravasation confirmed on  image. She reported a feeling of pressure during the injection, "like my arm was about to pop," but denied pain at the time of my evaluation. On physical exam, there was significant swelling of the right forearm without associated tenderness or erythema. She was instructed to keep the right arm elevated for the next 24 hours, apply a cold compress, and inform medical staff if she experiences increased pain, swelling, or erythema. Will plan to follow up with patient in 24 hours.     Michael Pickard MD  Radiology PGY-3  [phone]    "

## 2019-04-28 NOTE — ASSESSMENT & PLAN NOTE
- Due to IV infiltration when administering contrast for CT chest  - U/S negative for clots in arterial or venous vessels  - Will continue alternating hot and cold pack with elevation. Slightly difficult as patient is getting fluid for sickle cell crisis

## 2019-04-28 NOTE — SUBJECTIVE & OBJECTIVE
Interval History: Patient had infiltrated IV overnight which leaked 50 ml to 100 ml of contrast into patient's right forearm. Forearm is swollen, but pulses are present, sensation intact, and ROM normal. U/S upper extremity is negative.     Review of Systems   Constitutional: Negative for fever.   Respiratory: Positive for cough and shortness of breath.    Cardiovascular: Positive for chest pain. Negative for palpitations.   Gastrointestinal: Negative for abdominal pain.   Musculoskeletal: Positive for arthralgias and back pain.     Objective:     Vital Signs (Most Recent):  Temp: 97.7 °F (36.5 °C) (04/28/19 1156)  Pulse: 98 (04/28/19 1156)  Resp: 20 (04/28/19 1156)  BP: (!) 167/102 (04/28/19 1156)  SpO2: 98 % (04/28/19 1156) Vital Signs (24h Range):  Temp:  [97.7 °F (36.5 °C)-99.2 °F (37.3 °C)] 97.7 °F (36.5 °C)  Pulse:  [] 98  Resp:  [18-24] 20  SpO2:  [95 %-100 %] 98 %  BP: (135-173)/() 167/102     Weight: 136.1 kg (300 lb)  Body mass index is 54.87 kg/m².  No intake or output data in the 24 hours ending 04/28/19 1219   Physical Exam   Constitutional: She is oriented to person, place, and time. She appears well-developed and well-nourished. No distress.   HENT:   Head: Normocephalic and atraumatic.   Mouth/Throat: No oropharyngeal exudate.   Eyes: Pupils are equal, round, and reactive to light. EOM are normal. No scleral icterus.   Neck: Normal range of motion. Neck supple.   Cardiovascular: Normal rate, regular rhythm, normal heart sounds and intact distal pulses. Exam reveals no friction rub.   No murmur heard.  Pulmonary/Chest: Effort normal. No respiratory distress. She has wheezes (left lung fields).   Abdominal: Soft. Bowel sounds are normal. She exhibits no distension. There is no tenderness. There is no guarding.   Musculoskeletal: Normal range of motion. She exhibits tenderness (bilateral knees and ankles). She exhibits no edema.   Lymphadenopathy:     She has no cervical adenopathy.    Neurological: She is oriented to person, place, and time. No cranial nerve deficit or sensory deficit.   Skin: Skin is warm and dry. No rash noted. No erythema. No pallor.   Nursing note and vitals reviewed.      Significant Labs: All pertinent labs within the past 24 hours have been reviewed.    Significant Imaging: I have reviewed all pertinent imaging results/findings within the past 24 hours.

## 2019-04-28 NOTE — PROGRESS NOTES
IM 5 notified of contrast extravasation and patient requesting another dose of dilaudid despite receiving last dose at 2232. Patient also requesting to eat. MD not comfortable with giving another dose at this time. Will attempt to insert another PIV and if unsuccessful will notify team. Will continue to monitor.

## 2019-04-28 NOTE — PROGRESS NOTES
[facility] Hospital Medicine  Progress Note    Patient Name: Nazanin Malone  MRN: [id number]  Patient Class: IP- Inpatient   Admission Date: 4/25/2019  Length of Stay: 3 days  Attending Physician: Cortney Arellano MD  Primary Care Provider: Balta Forbes MD    [facility] Medicine Team: [facility] MED 5 Pee Lmia MD    Subjective:     Principal Problem:Sickle cell disease, type S beta-zero thalassemia, with acute chest syndrome    HPI:  Ms. Malone is a 37 year old lady with a past medical history significant for sickle cell beta-thalassemia, asthma, HTN, and trigeminal neuralgia who presents to [facility] ED with complaints of cough, shortness of breath, and body pain. The patient states symptoms began this morning with a headache. She took home dose of percocet 10mg that did not help her symptoms. She then began to notice bilateral leg and toe, bilateral hip, left arm, and chest pain with breathing which prompted her to come in to the ED for further evaluation. The patient also endorsed associated shortness of breath. She endorses a cough productive of thick white sputum that began overnight as well as fevers (103.1) and chills at home. 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 She endorses good PO intake. She has a history of asthma and tried her albuterol rescue inhaler that did not improve her shortness of breath. The patient is currently on her menstrual cycle and she endorses heavy flow with associated clots which is normal for her. She states that her sickle cell often flares when she is on her cycle. She reports one previous episode of acute chest syndrome in 2017.    The patient follows with H[place]ology [place] [facility] for her Sickle beta-thalassemia, last seen in clinic on 1/9/19. Her baseline Hg is 11.8-12.0 and she states she has only ever needed a transfusion when she was 13 years old or so. She had previously required iron infusions in the  past, most recently in July 2018 but has not needed them since. She was previously on hydroxyurea but stopped the medication on her own due to complaints of rash. She was offered L-glutamine but it was not covered by insurance.    In the ED the patient was febrile and required IV narcotics for pain relief. CXR did not show any focal consolidation. She O2 sat was 99% on room air. Given fever and chest pain she was given 1x dose of rocefin and IVFs She was admitted to hospital medicine for sickle cell pain crisis.        Interval History: NAEON. Discussed pain control and taking PO medication to assist.  Plan for CTA later in the day.  Patient reports SOB with exertion and deep chest pain with breathing.  Otherwise no new complaints     Review of Systems   Constitutional: Negative for fever.   Respiratory: Positive for cough and shortness of breath.    Cardiovascular: Positive for chest pain. Negative for palpitations.   Gastrointestinal: Negative for abdominal pain.   Musculoskeletal: Positive for arthralgias and back pain.     Objective:     Vital Signs (Most Recent):  Temp: 98.9 °F (37.2 °C) (04/28/19 0736)  Pulse: 75 (04/28/19 0736)  Resp: 18 (04/28/19 0736)  BP: 135/67 (04/28/19 0736)  SpO2: 96 % (04/28/19 0736) Vital Signs (24h Range):  Temp:  [98.4 °F (36.9 °C)-99.2 °F (37.3 °C)] 98.9 °F (37.2 °C)  Pulse:  [] 75  Resp:  [18-24] 18  SpO2:  [86 %-100 %] 96 %  BP: (135-173)/(67-89) 135/67     Weight: 136.1 kg (300 lb)  Body mass index is 54.87 kg/m².  No intake or output data in the 24 hours ending 04/28/19 0934   Physical Exam   Constitutional: She is oriented to person, place, and time. She appears well-developed and well-nourished. No distress.   HENT:   Head: Normocephalic and atraumatic.   Mouth/Throat: No oropharyngeal exudate.   Eyes: Pupils are equal, round, and reactive to light. EOM are normal. No scleral icterus.   Neck: Normal range of motion. Neck supple.   Cardiovascular: Normal rate, regular  rhythm, normal heart sounds and intact distal pulses. Exam reveals no friction rub.   No murmur heard.  Pulmonary/Chest: Effort normal. No respiratory distress. She has wheezes (left lung fields).   Abdominal: Soft. Bowel sounds are normal. She exhibits no distension. There is no tenderness. There is no guarding.   Musculoskeletal: Normal range of motion. She exhibits tenderness (bilateral knees and ankles). She exhibits no edema.   Lymphadenopathy:     She has no cervical adenopathy.   Neurological: She is oriented to person, place, and time. No cranial nerve deficit or sensory deficit.   Skin: Skin is warm and dry. No rash noted. No erythema. No pallor.   Nursing note and vitals reviewed.      Significant Labs: All pertinent labs within the past 24 hours have been reviewed.    Significant Imaging: I have reviewed all pertinent imaging results/findings within the past 24 hours.    Assessment/Plan:      * Sickle cell disease, type S beta-zero thalassemia, with acute chest syndrome  Continue abx to treat suspected PNA  - PRN oxycodone and dilaudid 1 for breakthrough  - Discussed importance of PO medications for pain control      Acute hypoxic resp failure  - desaturations when walking, will obtain CTA, suspect PNA complicated by sickle cell  - Continue oxygen as needed      Morbid obesity due to excess calories  Cardiac diet.  a1c of 5.4      Benign essential HTN  - Resumed norvasc 10mg  - Holding HCTZ at this time as patient currently in acute vaso-occlusive crisis, would D/C as intravascular depletion could precipitate sickle cell pain crisis in future    Iron deficiency anemia  - CBC daily  - Consider repeat Iron studies once acute chest resolves        VTE Risk Mitigation (From admission, onward)        Ordered     enoxaparin injection 40 mg  Every 12 hours      04/26/19 0014     IP VTE HIGH RISK PATIENT  Once      04/26/19 0014          Dispo: home with PO abx pending CTA    Pee Lima MD  Department of  Hospital Medicine [facility]

## 2019-04-28 NOTE — CARE UPDATE
MD paged regarding R forearm swelling. Earlier patient underwent unsuccessful CTA due to infiltration of IV contrast. Radiology note reports approximately 100ml of contrast extravasated before investigation stopped. Upon arriving to patient room, patient complained of tightness and pain in the R forearm. On examination, the R arm was swollen, tense, non-erythematous, cool (although she had cold compress on) and mildly tender to palpation. Radial pulse was easily palpable and seen on dopplers by nursing staff.     Nursing staff instructed to alternate cold and hot compresses and to elevate limbs. Patient to get her PRN dilaudid for the pain. Nursing staff instructed to frequently check patient's arm for changes in physical exam and pulses.    KATLIN Ferguson MD

## 2019-04-28 NOTE — PROGRESS NOTES
[facility] Hospital Medicine  Progress Note    Patient Name: Nazanin Malone  MRN: [id number]  Patient Class: IP- Inpatient   Admission Date: 4/25/2019  Length of Stay: 3 days  Attending Physician: Cortney Arellano MD  Primary Care Provider: Balta Forbes MD    [facility] Medicine Team: [facility] HOSP MED 5 Raymond Aparicio MD    Subjective:     Principal Problem:Sickle cell disease, type S beta-zero thalassemia, with acute chest syndrome    HPI:  Ms. Malone is a 37 year old lady with a past medical history significant for sickle cell beta-thalassemia, asthma, HTN, and trigeminal neuralgia who presents to [facility] ED with complaints of cough, shortness of breath, and body pain. The patient states symptoms began this morning with a headache. She took home dose of percocet 10mg that did not help her symptoms. She then began to notice bilateral leg and toe, bilateral hip, left arm, and chest pain with breathing which prompted her to come in to the ED for further evaluation. The patient also endorsed associated shortness of breath. She endorses a cough productive of thick white sputum that began overnight as well as fevers (103.1) and chills at home. 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 She endorses good PO intake. She has a history of asthma and tried her albuterol rescue inhaler that did not improve her shortness of breath. The patient is currently on her menstrual cycle and she endorses heavy flow with associated clots which is normal for her. She states that her sickle cell often flares when she is on her cycle. She reports one previous episode of acute chest syndrome in 2017.    The patient follows with Hematology at [facility] for her Sickle beta-thalassemia, last seen in clinic on 1/9/19. Her baseline Hg is 11.8-12.0 and she states she has only ever needed a transfusion when she was 13 years old or so. She had previously required iron infusions in the  past, most recently in 2018 but has not needed them since. She was previously on hydroxyurea but stopped the medication on her own due to complaints of rash. She was offered L-glutamine but it was not covered by insurance.    In the ED the patient was febrile and required IV narcotics for pain relief. CXR did not show any focal consolidation. She O2 sat was 99% on room air. Given fever and chest pain she was given 1x dose of rocefin and IVFs She was admitted to hospital medicine for sickle cell pain crisis.      Hospital Course:  Interval Hx:   NAEO, she is 93% on RA. She becomes dyspneic on exertion. Bilaterally diffuse wheezes. CTPA today for hypoxia. Requesting more dilaudid.     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      Current Outpatient Medications on File Prior to Encounter   Medication Sig    acetaminophen (TYLENOL EXTRA STRENGTH) 500 MG tablet Take 1,000 mg by mouth daily as needed for Pain.    albuterol (VENTOLIN HFA) 90 mcg/actuation inhaler Inhale 2 puffs into the lungs every 6 (six) hours as needed for Wheezing. Rescue    amLODIPine (NORVASC) 10 MG tablet Take 1 tablet (10 mg total) by mouth once daily.    ascorbic acid, vitamin C, (VITAMIN C) 500 MG tablet Take 500 mg by mouth once daily.    calcium carbonate (TUMS) 200 mg calcium (500 mg)  chewable tablet Take 1 tablet by mouth daily as needed for Heartburn (stomach).    carBAMazepine (TEGRETOL) 200 mg tablet Take 4 tablets (800 mg total) by mouth 2 (two) times daily.    cyclobenzaprine (FLEXERIL) 10 MG tablet Take 1 tablet (10 mg total) by mouth 3 (three) times daily as needed.    ergocalciferol (VITAMIN D2) 50,000 unit Cap Take 1 capsule (50,000 Units total) by mouth every 7 days. (Patient taking differently: Take 50,000 Units by mouth every 7 days. Tuesday)    FLUoxetine 40 MG capsule TAKE 1 CAPSULE BY MOUTH EVERY DAY    fluticasone (FLONASE) 50 mcg/actuation nasal spray 2 sprays (100 mcg total) by Each Nare route once daily.    gabapentin (NEURONTIN) 800 MG tablet TAKE 1 TABLET (800 MG TOTAL) BY MOUTH 3 (THREE) TIMES DAILY. (Patient taking differently: Take 800 mg by mouth 2 (two) times daily. )    hydroCHLOROthiazide (HYDRODIURIL) 25 MG tablet Take 1 tablet (25 mg total) by mouth once daily.    ondansetron (ZOFRAN-ODT) 8 MG TbDL Take 1 tablet (8 mg total) by mouth every 6 (six) hours as needed.    oxyCODONE-acetaminophen (PERCOCET)  mg per tablet Take 1 tablet by mouth every 4 (four) hours as needed for Pain.    prochlorperazine (COMPAZINE) 10 MG tablet Take 1 tablet (10 mg total) by mouth every 6 (six) hours as needed.    promethazine (PHENERGAN) 6.25 mg/5 mL syrup Take 20 mLs (25 mg total) by mouth every 6 (six) hours as needed for Nausea.    senna-docusate 8.6-50 mg (PERICOLACE) 8.6-50 mg per tablet Take 1 tablet by mouth 2 (two) times daily. (Patient taking differently: Take 1 tablet by mouth 2 (two) times daily as needed for Constipation. )    albuterol (ACCUNEB) 0.63 mg/3 mL Nebu Take 3 mLs (0.63 mg total) by nebulization every 6 (six) hours as needed (for wheezing/shortness of breath). Rescue    clonazePAM (KLONOPIN) 2 MG Tab Take 1 tablet (2 mg total) by mouth once daily.    hydrOXYzine HCl (ATARAX) 25 MG tablet      Family History     Problem Relation (Age of Onset)     Diabetes Mother    Heart disease Mother, Father        Tobacco Use    Smoking status: Never Smoker    Smokeless tobacco: Never Used   Substance and Sexual Activity    Alcohol use: No    Drug use: No    Sexual activity: Not Currently     Birth control/protection: See Surgical Hx     Review of Systems   Constitutional: Positive for chills and fever. Negative for unexpected weight change.   HENT: Negative for rhinorrhea, sinus pressure, sinus pain, sneezing and sore throat.    Eyes: Negative for visual disturbance.   Respiratory: Positive for cough (productive of white sputum), chest tightness and wheezing. Negative for shortness of breath.    Cardiovascular: Positive for chest pain and leg swelling. Negative for palpitations.   Gastrointestinal: Negative for abdominal pain, constipation, diarrhea, nausea and vomiting.   Genitourinary: Negative for difficulty urinating, dysuria and flank pain.   Musculoskeletal: Positive for arthralgias and myalgias. Negative for neck stiffness.   Skin: Negative for pallor and rash.   Neurological: Positive for headaches. Negative for seizures, syncope and weakness.   Hematological: Negative for adenopathy.     Objective:     Vital Signs (Most Recent):  Temp: 98.9 °F (37.2 °C) (04/27/19 1119)  Pulse: 101 (04/27/19 1140)  Resp: 20 (04/27/19 1140)  BP: 136/69 (04/27/19 0900)  SpO2: (!) 91 % (04/27/19 1140) Vital Signs (24h Range):  Temp:  [97.2 °F (36.2 °C)-99.4 °F (37.4 °C)] 98.9 °F (37.2 °C)  Pulse:  [] 101  Resp:  [15-22] 20  SpO2:  [86 %-100 %] 91 %  BP: (111-136)/(59-69) 136/69     Weight: 136.1 kg (300 lb)  Body mass index is 54.87 kg/m².    Physical Exam   Constitutional: She is oriented to person, place, and time. She appears well-developed and well-nourished. No distress.   HENT:   Head: Normocephalic and atraumatic.   Mouth/Throat: No oropharyngeal exudate.   Eyes: Pupils are equal, round, and reactive to light. EOM are normal. No scleral icterus.   Neck: Normal  range of motion. Neck supple.   Cardiovascular: Normal rate, regular rhythm, normal heart sounds and intact distal pulses. Exam reveals no friction rub.   No murmur heard.  Pulmonary/Chest: Effort normal. No respiratory distress. She has wheezes (left lung fields).   Abdominal: Soft. Bowel sounds are normal. She exhibits no distension. There is no tenderness. There is no guarding.   Musculoskeletal: Normal range of motion. She exhibits tenderness (bilateral knees and ankles). She exhibits no edema.   Lymphadenopathy:     She has no cervical adenopathy.   Neurological: She is oriented to person, place, and time. No cranial nerve deficit or sensory deficit.   Skin: Skin is warm and dry. No rash noted. No erythema. No pallor.   Nursing note and vitals reviewed.        CRANIAL NERVES     CN III, IV, VI   Pupils are equal, round, and reactive to light.  Extraocular motions are normal.        Significant Labs:   CBC:   Recent Labs   Lab 04/25/19 1845 04/26/19  0342 04/27/19  0712   WBC 8.25 7.90 5.44   HGB 11.8* 10.4* 10.0*   HCT 35.8* 32.5* 30.6*    189 184     CMP:   Recent Labs   Lab 04/25/19 1845 04/26/19  0342 04/27/19  0712    137 138   K 3.4* 3.3* 3.3*    105 104   CO2 25 24 26    116* 98   BUN 6 6 7   CREATININE 0.8 0.8 0.7   CALCIUM 9.8 8.8 8.8   PROT 8.5* 7.3 7.0   ALBUMIN 4.0 3.5 3.2*   BILITOT 0.6 0.5 0.3   ALKPHOS 105 86 73   AST 17 18 18   ALT 10 9* 10   ANIONGAP 11 8 8   EGFRNONAA >60.0 >60.0 >60.0     All pertinent labs within the past 24 hours have been reviewed.      Significant Imaging: I have reviewed all imaging in the last 24 hours          Assessment/Plan:      * Sickle cell disease, type S beta-zero thalassemia, with acute chest syndrome  Patient with history of Sickle cell-beta thalassemia, presenting with acute pain crisis. Did not respond to outpatient PO oxy 10mg. Patient reporting fevers, chills, and cough night before presentation. Cough productive of white sputum,  "patient febrile and tachycardic on presentation. Flu negative. Works as a nurse, was caring for multiple patients with pneumonia. CXR negative for consolidation, however given history and chest pain was given dose of ceftriaxone in ED.  Patient also currently on menstrual period and she reports having significant, heavy flow. Likely exacerbating symptoms. Reticulocyte count 2.7. Patient not on hydroxyurea due to side effects.    Baseline Hg 11.8-12.0 per chart review, at baseline on presentation today. Given fevers, chest pain, and cough concerning for acute chest.   -Assessed by Heme/onc, not likely she is having acute sickle cell crisis given low retic count & stable hb, HBS quantitative in process    Plan:  - ABX: Continuing ceftriaxone and azithromycin  - Pain control: Scheduled dilauded IV 1mg q4h with 0.5mg IV prn. GWill increase as needed  - Supplemental O2, incentive spirometry and Acapella treatments ordered  - Albuterol nebs prn for wheezing  - Will recheck CXR in the AM  - Daily reticulocyte count  - DVT Prophylaxis: Lovenox   - Will check quantitative Ig levels as patient states she has multiple episodes of pneumonia per year      Anxiety  Patient states she has significant anxiety, describes subclinical "stroke" in past however after discussing with patient she believes it was 2/2 anxiety as her daughter had brought a stranger into their home while the patient was hospitalized. She was experiencing left arm and leg weakness, eye twitching, and difficulty speaking. Symptoms at that time resolved acutely with prn hydralazine. CT head was negative.    Previously on klonopin and remeron however she states she no longer takes them as symptoms well controlled on fluoxetine 40mg    - Continued home fluoxetine      Morbid obesity due to excess calories  Cardiac diet.  a1c of 5.4      Benign essential HTN  Patient on Norvasc 10mg and HCTZ 25mg at home Hypertensive and tachycardic on presentation, likely 2/2 " pain. Patient did not take HCTZ prior to admit.    - Resumed norvasc 10mg  - Holding HCTZ at this time as patient currently in acute vaso-occlusive crisis, would D/C as intravascular depletion could precipitate sickle cell pain crisis in future      Iron deficiency anemia  Patient with prior history of iron deficiency, presumed due to heavy menses. Last Ferritin in 1/2019 was 29. Followed by hematology, required infusion in the past, most recently in 7/2018.    Hg on admit 11.8 at baseline per chart review. MCV 69, however patient with Beta thalassemia. Ferritin would likely be elevated 2/2 acute crisis so unlikely helpful in assessing iron status at this time    - CBC daily  - Consider repeat Iron studies once acute chest resolves        VTE Risk Mitigation (From admission, onward)        Ordered     enoxaparin injection 40 mg  Every 12 hours      04/26/19 0014     IP VTE HIGH RISK PATIENT  Once      04/26/19 0014              Raymond Aparicio MD  Department of Hospital Medicine   [facility]

## 2019-04-28 NOTE — SUBJECTIVE & OBJECTIVE
Interval History: NAEON. Discussed pain control and taking PO medication to assist.  Plan for CTA later in the day.  Patient reports SOB with exertion and deep chest pain with breathing.  Otherwise no new complaints     Review of Systems   Constitutional: Negative for fever.   Respiratory: Positive for cough and shortness of breath.    Cardiovascular: Positive for chest pain. Negative for palpitations.   Gastrointestinal: Negative for abdominal pain.   Musculoskeletal: Positive for arthralgias and back pain.     Objective:     Vital Signs (Most Recent):  Temp: 98.9 °F (37.2 °C) (04/28/19 0736)  Pulse: 75 (04/28/19 0736)  Resp: 18 (04/28/19 0736)  BP: 135/67 (04/28/19 0736)  SpO2: 96 % (04/28/19 0736) Vital Signs (24h Range):  Temp:  [98.4 °F (36.9 °C)-99.2 °F (37.3 °C)] 98.9 °F (37.2 °C)  Pulse:  [] 75  Resp:  [18-24] 18  SpO2:  [86 %-100 %] 96 %  BP: (135-173)/(67-89) 135/67     Weight: 136.1 kg (300 lb)  Body mass index is 54.87 kg/m².  No intake or output data in the 24 hours ending 04/28/19 0934   Physical Exam   Constitutional: She is oriented to person, place, and time. She appears well-developed and well-nourished. No distress.   HENT:   Head: Normocephalic and atraumatic.   Mouth/Throat: No oropharyngeal exudate.   Eyes: Pupils are equal, round, and reactive to light. EOM are normal. No scleral icterus.   Neck: Normal range of motion. Neck supple.   Cardiovascular: Normal rate, regular rhythm, normal heart sounds and intact distal pulses. Exam reveals no friction rub.   No murmur heard.  Pulmonary/Chest: Effort normal. No respiratory distress. She has wheezes (left lung fields).   Abdominal: Soft. Bowel sounds are normal. She exhibits no distension. There is no tenderness. There is no guarding.   Musculoskeletal: Normal range of motion. She exhibits tenderness (bilateral knees and ankles). She exhibits no edema.   Lymphadenopathy:     She has no cervical adenopathy.   Neurological: She is oriented to  person, place, and time. No cranial nerve deficit or sensory deficit.   Skin: Skin is warm and dry. No rash noted. No erythema. No pallor.   Nursing note and vitals reviewed.      Significant Labs: All pertinent labs within the past 24 hours have been reviewed.    Significant Imaging: I have reviewed all pertinent imaging results/findings within the past 24 hours.

## 2019-04-28 NOTE — PROGRESS NOTES
Unsuccessful attempt at inserting 18g PIV. Patient requesting IV benadryl due to site of extravasation swelling and itching. IM 5 notified of patient's request. Will continue to monitor.

## 2019-04-28 NOTE — PROGRESS NOTES
"Patient evaluated in the CT scanner after tech called to report approximately 100cc contrast extravasation into the R forearm. Extravasation confirmed on  image. She reported a feeling of pressure during the injection, "like my arm was about to pop," but denied pain at the time of my evaluation. On physical exam, there was significant swelling of the right forearm without associated tenderness or erythema. She was instructed to keep the right arm elevated for the next 24 hours, apply a cold compress, and inform medical staff if she experiences increased pain, swelling, or erythema. Will plan to follow up with patient in 24 hours.      Michael Pickard MD  Radiology PGY-3  [phone]      "

## 2019-04-28 NOTE — ASSESSMENT & PLAN NOTE
Patient with history of Sickle cell-beta thalassemia, presenting with acute pain crisis. Did not respond to outpatient PO oxy 10mg. Patient reporting fevers, chills, and cough night before presentation. Cough productive of white sputum, patient febrile and tachycardic on presentation. Flu negative. Works as a nurse, was caring for multiple patients with pneumonia. CXR negative for consolidation, however given history and chest pain was given dose of ceftriaxone in ED.  Patient also currently on menstrual period and she reports having significant, heavy flow. Likely exacerbating symptoms. Reticulocyte count 2.7. Patient not on hydroxyurea due to side effects.    Baseline Hg 11.8-12.0 per chart review, at baseline on presentation today. Given fevers, chest pain, and cough concerning for acute chest.   -Assessed by Heme/onc, not likely she is having acute sickle cell crisis given low retic count & stable hb, HBS quantitative in process    Plan:  - ABX: Continuing ceftriaxone and azithromycin  - Pain control: Scheduled dilauded IV 1mg q4h with 0.5mg IV prn.   - Supplemental O2, incentive spirometry and Acapella treatments ordered  - Albuterol nebs prn for wheezing  - DVT Prophylaxis: Lovenox

## 2019-04-29 LAB
ALBUMIN SERPL BCP-MCNC: 3.3 G/DL (ref 3.5–5.2)
ALP SERPL-CCNC: 73 U/L (ref 55–135)
ALT SERPL W/O P-5'-P-CCNC: 15 U/L (ref 10–44)
ANION GAP SERPL CALC-SCNC: 9 MMOL/L (ref 8–16)
AST SERPL-CCNC: 20 U/L (ref 10–40)
BASOPHILS # BLD AUTO: 0.02 K/UL (ref 0–0.2)
BASOPHILS NFR BLD: 0.3 % (ref 0–1.9)
BILIRUB SERPL-MCNC: 0.2 MG/DL (ref 0.1–1)
BUN SERPL-MCNC: 7 MG/DL (ref 6–20)
CALCIUM SERPL-MCNC: 8.6 MG/DL (ref 8.7–10.5)
CHLORIDE SERPL-SCNC: 103 MMOL/L (ref 95–110)
CO2 SERPL-SCNC: 26 MMOL/L (ref 23–29)
CREAT SERPL-MCNC: 0.7 MG/DL (ref 0.5–1.4)
DIFFERENTIAL METHOD: ABNORMAL
EOSINOPHIL # BLD AUTO: 0.2 K/UL (ref 0–0.5)
EOSINOPHIL NFR BLD: 3 % (ref 0–8)
ERYTHROCYTE [DISTWIDTH] IN BLOOD BY AUTOMATED COUNT: 16.8 % (ref 11.5–14.5)
EST. GFR  (AFRICAN AMERICAN): >60 ML/MIN/1.73 M^2
EST. GFR  (NON AFRICAN AMERICAN): >60 ML/MIN/1.73 M^2
GLUCOSE SERPL-MCNC: 78 MG/DL (ref 70–110)
HCT VFR BLD AUTO: 30.9 % (ref 37–48.5)
HGB BLD-MCNC: 9.8 G/DL (ref 12–16)
HGB FRACT BLD ELPH-IMP: NORMAL
HGB S MFR BLD ELPH: 67 %
IMM GRANULOCYTES # BLD AUTO: 0.04 K/UL (ref 0–0.04)
IMM GRANULOCYTES NFR BLD AUTO: 0.6 % (ref 0–0.5)
LYMPHOCYTES # BLD AUTO: 3.1 K/UL (ref 1–4.8)
LYMPHOCYTES NFR BLD: 47.3 % (ref 18–48)
MCH RBC QN AUTO: 22.5 PG (ref 27–31)
MCHC RBC AUTO-ENTMCNC: 31.7 G/DL (ref 32–36)
MCV RBC AUTO: 71 FL (ref 82–98)
MONOCYTES # BLD AUTO: 0.7 K/UL (ref 0.3–1)
MONOCYTES NFR BLD: 10.3 % (ref 4–15)
NEUTROPHILS # BLD AUTO: 2.5 K/UL (ref 1.8–7.7)
NEUTROPHILS NFR BLD: 38.5 % (ref 38–73)
NRBC BLD-RTO: 1 /100 WBC
PLATELET # BLD AUTO: 214 K/UL (ref 150–350)
PMV BLD AUTO: 9.9 FL (ref 9.2–12.9)
POTASSIUM SERPL-SCNC: 3.9 MMOL/L (ref 3.5–5.1)
PROT SERPL-MCNC: 7.2 G/DL (ref 6–8.4)
RBC # BLD AUTO: 4.35 M/UL (ref 4–5.4)
SODIUM SERPL-SCNC: 138 MMOL/L (ref 136–145)
WBC # BLD AUTO: 6.6 K/UL (ref 3.9–12.7)

## 2019-04-29 PROCEDURE — 94640 AIRWAY INHALATION TREATMENT: CPT

## 2019-04-29 PROCEDURE — 25000003 PHARM REV CODE 250: Performed by: STUDENT IN AN ORGANIZED HEALTH CARE EDUCATION/TRAINING PROGRAM

## 2019-04-29 PROCEDURE — 99232 PR SUBSEQUENT HOSPITAL CARE,LEVL II: ICD-10-PCS | Mod: ,,, | Performed by: INTERNAL MEDICINE

## 2019-04-29 PROCEDURE — 25000242 PHARM REV CODE 250 ALT 637 W/ HCPCS: Performed by: STUDENT IN AN ORGANIZED HEALTH CARE EDUCATION/TRAINING PROGRAM

## 2019-04-29 PROCEDURE — 94664 DEMO&/EVAL PT USE INHALER: CPT

## 2019-04-29 PROCEDURE — 82803 BLOOD GASES ANY COMBINATION: CPT

## 2019-04-29 PROCEDURE — 99232 SBSQ HOSP IP/OBS MODERATE 35: CPT | Mod: ,,, | Performed by: INTERNAL MEDICINE

## 2019-04-29 PROCEDURE — 85025 COMPLETE CBC W/AUTO DIFF WBC: CPT

## 2019-04-29 PROCEDURE — 80053 COMPREHEN METABOLIC PANEL: CPT

## 2019-04-29 PROCEDURE — 11000001 HC ACUTE MED/SURG PRIVATE ROOM

## 2019-04-29 PROCEDURE — 94668 MNPJ CHEST WALL SBSQ: CPT

## 2019-04-29 PROCEDURE — 25000003 PHARM REV CODE 250: Performed by: INTERNAL MEDICINE

## 2019-04-29 PROCEDURE — 87205 SMEAR GRAM STAIN: CPT

## 2019-04-29 PROCEDURE — 27000221 HC OXYGEN, UP TO 24 HOURS

## 2019-04-29 PROCEDURE — 36415 COLL VENOUS BLD VENIPUNCTURE: CPT

## 2019-04-29 PROCEDURE — 94761 N-INVAS EAR/PLS OXIMETRY MLT: CPT

## 2019-04-29 PROCEDURE — 87070 CULTURE OTHR SPECIMN AEROBIC: CPT

## 2019-04-29 PROCEDURE — 99900035 HC TECH TIME PER 15 MIN (STAT)

## 2019-04-29 PROCEDURE — 63600175 PHARM REV CODE 636 W HCPCS: Performed by: STUDENT IN AN ORGANIZED HEALTH CARE EDUCATION/TRAINING PROGRAM

## 2019-04-29 RX ADMIN — STANDARDIZED SENNA CONCENTRATE AND DOCUSATE SODIUM 1 TABLET: 8.6; 5 TABLET, FILM COATED ORAL at 09:04

## 2019-04-29 RX ADMIN — AMOXICILLIN AND CLAVULANATE POTASSIUM 1 TABLET: 875; 125 TABLET, FILM COATED ORAL at 08:04

## 2019-04-29 RX ADMIN — ENOXAPARIN SODIUM 40 MG: 100 INJECTION SUBCUTANEOUS at 08:04

## 2019-04-29 RX ADMIN — HYDROMORPHONE HYDROCHLORIDE 1 MG: 1 INJECTION, SOLUTION INTRAMUSCULAR; INTRAVENOUS; SUBCUTANEOUS at 03:04

## 2019-04-29 RX ADMIN — GUAIFENESIN AND DEXTROMETHORPHAN HYDROBROMIDE 1 TABLET: 600; 30 TABLET, EXTENDED RELEASE ORAL at 09:04

## 2019-04-29 RX ADMIN — FLUOXETINE 40 MG: 20 CAPSULE ORAL at 09:04

## 2019-04-29 RX ADMIN — GABAPENTIN 800 MG: 400 CAPSULE ORAL at 09:04

## 2019-04-29 RX ADMIN — PREDNISONE 40 MG: 20 TABLET ORAL at 09:04

## 2019-04-29 RX ADMIN — DIPHENHYDRAMINE HYDROCHLORIDE 25 MG: 25 CAPSULE ORAL at 06:04

## 2019-04-29 RX ADMIN — AMOXICILLIN AND CLAVULANATE POTASSIUM 1 TABLET: 875; 125 TABLET, FILM COATED ORAL at 09:04

## 2019-04-29 RX ADMIN — GABAPENTIN 800 MG: 400 CAPSULE ORAL at 08:04

## 2019-04-29 RX ADMIN — HYDROMORPHONE HYDROCHLORIDE 1 MG: 1 INJECTION, SOLUTION INTRAMUSCULAR; INTRAVENOUS; SUBCUTANEOUS at 07:04

## 2019-04-29 RX ADMIN — IPRATROPIUM BROMIDE AND ALBUTEROL SULFATE 3 ML: .5; 3 SOLUTION RESPIRATORY (INHALATION) at 09:04

## 2019-04-29 RX ADMIN — ACETAMINOPHEN 1000 MG: 500 TABLET ORAL at 11:04

## 2019-04-29 RX ADMIN — AMLODIPINE BESYLATE 10 MG: 10 TABLET ORAL at 09:04

## 2019-04-29 RX ADMIN — ALBUTEROL SULFATE 2.5 MG: 2.5 SOLUTION RESPIRATORY (INHALATION) at 03:04

## 2019-04-29 RX ADMIN — DIPHENHYDRAMINE HYDROCHLORIDE 25 MG: 25 CAPSULE ORAL at 09:04

## 2019-04-29 RX ADMIN — AZITHROMYCIN 500 MG: 250 TABLET, FILM COATED ORAL at 05:04

## 2019-04-29 RX ADMIN — HYDROMORPHONE HYDROCHLORIDE 1 MG: 1 INJECTION, SOLUTION INTRAMUSCULAR; INTRAVENOUS; SUBCUTANEOUS at 08:04

## 2019-04-29 RX ADMIN — OXYCODONE HYDROCHLORIDE 10 MG: 10 TABLET ORAL at 10:04

## 2019-04-29 RX ADMIN — IPRATROPIUM BROMIDE AND ALBUTEROL SULFATE 3 ML: .5; 3 SOLUTION RESPIRATORY (INHALATION) at 04:04

## 2019-04-29 RX ADMIN — CARBAMAZEPINE 800 MG: 200 TABLET ORAL at 09:04

## 2019-04-29 RX ADMIN — HYDROMORPHONE HYDROCHLORIDE 1 MG: 1 INJECTION, SOLUTION INTRAMUSCULAR; INTRAVENOUS; SUBCUTANEOUS at 01:04

## 2019-04-29 RX ADMIN — IPRATROPIUM BROMIDE AND ALBUTEROL SULFATE 3 ML: .5; 3 SOLUTION RESPIRATORY (INHALATION) at 12:04

## 2019-04-29 RX ADMIN — ENOXAPARIN SODIUM 40 MG: 100 INJECTION SUBCUTANEOUS at 09:04

## 2019-04-29 RX ADMIN — STANDARDIZED SENNA CONCENTRATE AND DOCUSATE SODIUM 1 TABLET: 8.6; 5 TABLET, FILM COATED ORAL at 08:04

## 2019-04-29 RX ADMIN — CARBAMAZEPINE 800 MG: 200 TABLET ORAL at 08:04

## 2019-04-29 RX ADMIN — IPRATROPIUM BROMIDE AND ALBUTEROL SULFATE 3 ML: .5; 3 SOLUTION RESPIRATORY (INHALATION) at 08:04

## 2019-04-29 RX ADMIN — ALBUTEROL SULFATE 2.5 MG: 2.5 SOLUTION RESPIRATORY (INHALATION) at 10:04

## 2019-04-29 RX ADMIN — GUAIFENESIN AND DEXTROMETHORPHAN HYDROBROMIDE 1 TABLET: 600; 30 TABLET, EXTENDED RELEASE ORAL at 08:04

## 2019-04-29 RX ADMIN — SODIUM CHLORIDE: 0.45 INJECTION, SOLUTION INTRAVENOUS at 09:04

## 2019-04-29 RX ADMIN — ACETAMINOPHEN 1000 MG: 500 TABLET ORAL at 06:04

## 2019-04-29 NOTE — PLAN OF CARE
Problem: Adult Inpatient Plan of Care  Goal: Patient-Specific Goal (Individualization)  Outcome: Ongoing (interventions implemented as appropriate)  Pt free of falls and injury. Pt AAOx4. Pain managed with PRN Hydromorphone x 2 & Oxycodone x 1during the shift.Respiratory treatment provided by RT,bed low, breaks locked, SR up x2, call light within reach, will continue to monitor.   04/27/19 9736   Patient-Specific Goal (Individualization)   Patient-Specific Goals (Include Timeframe) Pt free of respiratory distress   OTHER   Individualized Care Needs Pt free of pain

## 2019-04-29 NOTE — PROGRESS NOTES
[facility] Hospital Medicine  Progress Note    Patient Name: Nazanin Malone  MRN: [id number]  Patient Class: IP- Inpatient   Admission Date: 4/25/2019  Length of Stay: 4 days  Attending Physician: Cortney Arellano MD  Primary Care Provider: Balta Forbes MD    [facility] Medicine Team: [facility] HOSP MED 5 Raymond Aparicio MD    Subjective:     Principal Problem:Sickle cell disease, type S beta-zero thalassemia, with acute chest syndrome    HPI:  Ms. Malone is a 37 year old lady with a past medical history significant for sickle cell beta-thalassemia, asthma, HTN, and trigeminal neuralgia who presents to [facility] ED with complaints of cough, shortness of breath, and body pain. The patient states symptoms began this morning with a headache. She took home dose of percocet 10mg that did not help her symptoms. She then began to notice bilateral leg and toe, bilateral hip, left arm, and chest pain with breathing which prompted her to come in to the ED for further evaluation. The patient also endorsed associated shortness of breath. She endorses a cough productive of thick white sputum that began overnight as well as fevers (103.1) and chills at home. 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 She endorses good PO intake. She has a history of asthma and tried her albuterol rescue inhaler that did not improve her shortness of breath. The patient is currently on her menstrual cycle and she endorses heavy flow with associated clots which is normal for her. She states that her sickle cell often flares when she is on her cycle. She reports one previous episode of acute chest syndrome in 2017.    The patient follows with Hematology at [facility] for her Sickle beta-thalassemia, last seen in clinic on 1/9/19. Her baseline Hg is 11.8-12.0 and she states she has only ever needed a transfusion when she was 13 years old or so. She had previously required iron infusions in the  past, most recently in July 2018 but has not needed them since. She was previously on hydroxyurea but stopped the medication on her own due to complaints of rash. She was offered L-glutamine but it was not covered by insurance.    In the ED the patient was febrile and required IV narcotics for pain relief. CXR did not show any focal consolidation. She O2 sat was 99% on room air. Given fever and chest pain she was given 1x dose of rocefin and IVFs She was admitted to hospital medicine for sickle cell pain crisis.      Hospital Course:  Patient admitted to hospital medicine for sickle cell pain crisis and shortness of breath. Initially, patient was treated with prn dilaudid and oral oxycodone. She was also started on continuous IVF in the form of lactate ringers and supplemental O2 as needed. Patient still complains of pain, although she primarily uses dilaudid and not much of her oral medication. There was some concern for acute chest, however heme/onc was consulted, reports that this is unlikely to be acute chest syndrome. On the night on 4/27, patient had an infiltrated IV when getting contrast for CT scan. Right arm is swollen, but pulses are present, full ROM, and sensation is intact. This was treated with elevation and alternating hot and cold packs. Swelling on the right forearm reduced considerably. Continue supportive treatment    Interval History: No acute events overnight. Right forearm is improving although patient still complains of pain in that arm. Pulses, movement, and sensation are intact.    Review of Systems   Constitutional: Negative for fever.   Respiratory: Positive for cough (productive of green mucus) and shortness of breath.    Cardiovascular: Positive for chest pain. Negative for palpitations.   Gastrointestinal: Negative for abdominal pain.   Musculoskeletal: Positive for arthralgias and back pain.     Objective:     Vital Signs (Most Recent):  Temp: 98.8 °F (37.1 °C) (04/29/19 0359)  Pulse:  97 (04/29/19 0359)  Resp: 20 (04/29/19 0359)  BP: 124/71 (04/29/19 0359)  SpO2: 95 % (04/29/19 0359) Vital Signs (24h Range):  Temp:  [97.7 °F (36.5 °C)-99.7 °F (37.6 °C)] 98.8 °F (37.1 °C)  Pulse:  [] 97  Resp:  [18-22] 20  SpO2:  [93 %-99 %] 95 %  BP: (124-168)/() 124/71     Weight: 136.1 kg (300 lb)  Body mass index is 54.87 kg/m².  No intake or output data in the 24 hours ending 04/29/19 0647   Physical Exam   Constitutional: She is oriented to person, place, and time. She appears well-developed and well-nourished. No distress.   HENT:   Head: Normocephalic and atraumatic.   Eyes: No scleral icterus.   Neck: Normal range of motion. Neck supple.   Cardiovascular: Normal rate, regular rhythm, normal heart sounds and intact distal pulses. Exam reveals no friction rub.   No murmur heard.  Pulmonary/Chest: Effort normal. No respiratory distress. She has wheezes (left lung fields).   Abdominal: Soft. Bowel sounds are normal. She exhibits no distension. There is no tenderness. There is no guarding.   Musculoskeletal: Normal range of motion. She exhibits tenderness (bilateral knees and ankles). She exhibits no edema.   Neurological: She is oriented to person, place, and time. No sensory deficit.   Skin: Skin is warm and dry. No rash noted. No erythema. No pallor.   Nursing note and vitals reviewed.      Significant Labs: All pertinent labs within the past 24 hours have been reviewed.    Significant Imaging: I have reviewed all pertinent imaging results/findings within the past 24 hours.    Assessment/Plan:      * Sickle cell disease, type S beta-zero thalassemia, with acute chest syndrome  Patient with history of Sickle cell-beta thalassemia, presenting with acute pain crisis. Did not respond to outpatient PO oxy 10mg. Patient reporting fevers, chills, and cough night before presentation. Cough productive of white sputum, patient febrile and tachycardic on presentation. Flu negative. Works as a nurse, was  "caring for multiple patients with pneumonia. CXR negative for consolidation, however given history and chest pain was given dose of ceftriaxone in ED.  Patient also currently on menstrual period and she reports having significant, heavy flow. Likely exacerbating symptoms. Reticulocyte count 2.7. Patient not on hydroxyurea due to side effects.    Baseline Hg 11.8-12.0 per chart review, at baseline on presentation today. Given fevers, chest pain, and cough concerning for acute chest.   -Assessed by Heme/onc, not likely she is having acute sickle cell crisis given low retic count & stable hb, HBS quantitative in process    Plan:  - ABX: Continuing augmentin and azithromycin  - Pain control: prn dilaudid IV 1 mg q4 and oxycodone 10 mg q6  - IV fluids .45 saline at 125 ml/hr  - Supplemental O2, incentive spirometry and Acapella treatments ordered  - Albuterol nebs prn for wheezing  - DVT Prophylaxis: Lovenox       Forearm swelling  - Due to IV infiltration when administering contrast for CT chest  - U/S negative for clots in arterial or venous vessels  - Will continue alternating hot and cold pack with elevation. Slightly difficult as patient is getting fluid for sickle cell crisis   - 4/29, much improved, will continue to monitor      Mild intermittent asthma with acute exacerbation  - treating with prednisone 40 mg for 5 days       Anxiety  Patient states she has significant anxiety, describes subclinical "stroke" in past however after discussing with patient she believes it was 2/2 anxiety as her daughter had brought a stranger into their home while the patient was hospitalized. She was experiencing left arm and leg weakness, eye twitching, and difficulty speaking. Symptoms at that time resolved acutely with prn hydralazine. CT head was negative.    Previously on klonopin and remeron however she states she no longer takes them as symptoms well controlled on fluoxetine 40mg    - Continued home fluoxetine      Morbid " obesity due to excess calories  Cardiac diet.  a1c of 5.4      Benign essential HTN  Patient on Norvasc 10mg and HCTZ 25mg at home Hypertensive and tachycardic on presentation, likely 2/2 pain. Patient did not take HCTZ prior to admit.    - Resumed norvasc 10mg  - Holding HCTZ at this time as patient currently in acute vaso-occlusive crisis, would D/C as intravascular depletion could precipitate sickle cell pain crisis in future      Iron deficiency anemia  Patient with prior history of iron deficiency, presumed due to heavy menses. Last Ferritin in 1/2019 was 29. Followed by hematology, required infusion in the past, most recently in 7/2018.    Hg on admit 11.8 at baseline per chart review. MCV 69, however patient with Beta thalassemia. Ferritin would likely be elevated 2/2 acute crisis so unlikely helpful in assessing iron status at this time    - CBC daily  - Consider repeat Iron studies once acute chest resolves        VTE Risk Mitigation (From admission, onward)        Ordered     enoxaparin injection 40 mg  Every 12 hours      04/26/19 0014     IP VTE HIGH RISK PATIENT  Once      04/26/19 0014              Raymond Aparicio MD  Department of Hospital Medicine   [facility]

## 2019-04-29 NOTE — ASSESSMENT & PLAN NOTE
- Due to IV infiltration when administering contrast for CT chest  - U/S negative for clots in arterial or venous vessels  - Will continue alternating hot and cold pack with elevation. Slightly difficult as patient is getting fluid for sickle cell crisis   - 4/29, much improved, will continue to monitor

## 2019-04-29 NOTE — MEDICAL/APP STUDENT
[facility] Hospital Medicine  Progress Note    Patient Name: Nazanin Malone  MRN: [id number]  Patient Class: IP- Inpatient   Admission Date: 4/25/2019  Length of Stay: 4 days  Attending Physician: Cortney Arellano MD  Primary Care Provider: Balta Forbes MD    [facility] Medicine Team: [facility]OSP MED 5 Nicholas Justin    Subjective:     Principal Problem:Sickle cell disease, type S beta-zero thalassemia, with acute chest syndrome    HPI: Ms. Malone is a 37 year old lady with a pertinent past medical history significant for sickle cell beta-thalassemia, asthma, HTN, and trigeminal neuralgia who presented to the [facility] ED on 4/25/19 with complaints of cough, shortness of breath, and body pain. The patient states symptoms began in the morning with a headache, pt took percocet 10mg but it did not help. Pt was then woke up at 11 am by pain in bilateral leg and toe, bilateral hip, left arm, assw/ chest pain with breathing and SOB. She endorses a cough productive of thick white sputum that began overnight as well as fevers (103.1) and chills at home. 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 She endorses good PO intake. She has a history of asthma and tried her albuterol rescue inhaler that did not improve her shortness of breath. The patient is currently on her menstrual cycle and she endorses heavy flow with associated clots which is normal for her. She states that her sickle cell often flares when she is on her cycle. She reports one previous episode of acute chest syndrome in 2017.    Pt not on hydroxyurea due to rash AE. Trying to get research med L-glutamine but its not covered by insurance. Baseline Hg 11.8-12.0      Hospital Course: Pt admitted to medicine. Worked up for possible ACS until rule out. Given supp o2, for SOB, dilaudid and po oxy 10s for pain and put on IVF. Hem/onc consulted and advise that acute chest is unlikely. On the night on 4/27, patient had an  infiltrated IV when getting contrast for CT scan.        Interval History: Pt desatting on walking and when off O2. PNM suspected ?viral vs atypical vs typical. Pt reports cough productive of green sputum. Sputum sample collection pending. R arm swelling resolved pulses are present, full ROM, and sensation is intact. Continue abx therapy. Will try d/c tylenol and see if still spiking fevers        Review of Systems   Constitutional: Negative for fever.   Respiratory: Positive for cough and shortness of breath.    Cardiovascular: Positive for chest pain. Negative for palpitations.   Gastrointestinal: Negative for abdominal pain.   Musculoskeletal: Positive for arthralgias and back pain.       Objective:     Vital Signs (Most Recent):  Temp: 98.4 °F (36.9 °C) (04/29/19 1208)  Pulse: 104 (04/29/19 1250)  Resp: 20 (04/29/19 1250)  BP: (!) 179/103 (04/29/19 1208)  SpO2: (!) 94 % (04/29/19 1250) Vital Signs (24h Range):  Temp:  [97.1 °F (36.2 °C)-99.7 °F (37.6 °C)] 98.4 °F (36.9 °C)  Pulse:  [] 104  Resp:  [16-22] 20  SpO2:  [93 %-99 %] 94 %  BP: (124-179)/() 179/103     Weight: 136.1 kg (300 lb)  Body mass index is 54.87 kg/m².    Intake/Output Summary (Last 24 hours) at 4/29/2019 1348  Last data filed at 4/29/2019 1208  Gross per 24 hour   Intake 0 ml   Output --   Net 0 ml      Physical Exam   Constitutional: She is oriented to person, place, and time. She appears well-developed and well-nourished. No distress.   HENT:   Head: Normocephalic and atraumatic.   Mouth/Throat: No oropharyngeal exudate.   Eyes: Pupils are equal, round, and reactive to light. EOM are normal. No scleral icterus.   Neck: Normal range of motion. Neck supple.   Cardiovascular: Normal rate, regular rhythm, normal heart sounds and intact distal pulses. Exam reveals no friction rub.   No murmur heard.  Pulmonary/Chest: Effort normal. No respiratory distress. She has wheezes (Bilat entire lung fields).   Abdominal: Soft. Bowel sounds are  normal. She exhibits no distension. There is no tenderness. There is no guarding.   Musculoskeletal: Normal range of motion. She exhibits tenderness (bilateral knees and ankles). She exhibits no edema.   Lymphadenopathy:     She has no cervical adenopathy.   Neurological: She is oriented to person, place, and time. No cranial nerve deficit or sensory deficit.   Skin: Skin is warm and dry. No rash noted. No erythema. No pallor.   Nursing note and vitals reviewed.          Significant Labs:   ABGs:   Recent Labs   Lab 04/28/19  1621   PH 7.371   PCO2 48.4*   HCO3 28.1*   POCSATURATED 90*   BE 3     CBC:   Recent Labs   Lab 04/28/19  0605 04/29/19  0349   WBC 5.72 6.60   HGB 9.8* 9.8*   HCT 31.1* 30.9*    214     CMP:   Recent Labs   Lab 04/28/19  0605 04/29/19  0349   * 138   K 3.3* 3.9    103   CO2 21* 26   GLU 79 78   BUN 5* 7   CREATININE 0.7 0.7   CALCIUM 8.3* 8.6*   PROT 7.0 7.2   ALBUMIN 3.2* 3.3*   BILITOT 0.3 0.2   ALKPHOS 71 73   AST 21 20   ALT 14 15   ANIONGAP 10 9   EGFRNONAA >60.0 >60.0     Cardiac Markers: No results for input(s): CKMB, MYOGLOBIN, BNP, TROPISTAT in the last 48 hours.    Significant Imaging: I have reviewed all pertinent imaging results/findings within the past 24 hours.     cxr reticular opacities bilat  US extremity NAD  CT chest RLL patchy airspace opacity    Assessment/Plan:      Active Diagnoses:    Diagnosis Date Noted POA    PRINCIPAL PROBLEM:  Sickle cell disease, type S beta-zero thalassemia, with acute chest syndrome [D57.411]    - ABX: Continuing augmentin and azithromycin  - Pain control: Scheduled dilauded IV 1mg q4h prn ,oxy 10 q6 prn   Supplemental O2, incentive spirometry and Acapella treatments ordered  - Albuterol nebs prn for wheezing  - DVT Prophylaxis: Lovenox          04/26/2019 Yes    Forearm swelling [M79.89]  resolved 04/28/2019 No    Mild intermittent asthma with acute exacerbation [J45.21]  prednisone 40 mg for 5 days d/c 5/1/19      04/26/2019 Yes    Morbid obesity due to excess calories [E66.01] 04/25/2017 Yes    Benign essential HTN [I10]   home med norvasc 10mg po qd  hctz d/blair 04/16/2017 Yes      Problems Resolved During this Admission:     VTE Risk Mitigation (From admission, onward)        Ordered     enoxaparin injection 40 mg  Every 12 hours      04/26/19 0014     IP VTE HIGH RISK PATIENT  Once      04/26/19 0014             Nicholas Justin  Department of Hospital Medicine   [facility]

## 2019-04-29 NOTE — ASSESSMENT & PLAN NOTE
Patient with history of Sickle cell-beta thalassemia, presenting with acute pain crisis. Did not respond to outpatient PO oxy 10mg. Patient reporting fevers, chills, and cough night before presentation. Cough productive of white sputum, patient febrile and tachycardic on presentation. Flu negative. Works as a nurse, was caring for multiple patients with pneumonia. CXR negative for consolidation, however given history and chest pain was given dose of ceftriaxone in ED.  Patient also currently on menstrual period and she reports having significant, heavy flow. Likely exacerbating symptoms. Reticulocyte count 2.7. Patient not on hydroxyurea due to side effects.    Baseline Hg 11.8-12.0 per chart review, at baseline on presentation today. Given fevers, chest pain, and cough concerning for acute chest.   -Assessed by Heme/onc, not likely she is having acute sickle cell crisis given low retic count & stable hb, HBS quantitative in process    Plan:  - ABX: Continuing augmentin and azithromycin  - Pain control: prn dilaudid IV 1 mg q4 and oxycodone 10 mg q6  - IV fluids .45 saline at 125 ml/hr  - Supplemental O2, incentive spirometry and Acapella treatments ordered  - Albuterol nebs prn for wheezing  - DVT Prophylaxis: Lovenox

## 2019-04-29 NOTE — SUBJECTIVE & OBJECTIVE
Interval History: No acute events overnight. Right forearm is improving although patient still complains of pain in that arm. Pulses, movement, and sensation are intact.    Review of Systems   Constitutional: Negative for fever.   Respiratory: Positive for cough (productive of green mucus) and shortness of breath.    Cardiovascular: Positive for chest pain. Negative for palpitations.   Gastrointestinal: Negative for abdominal pain.   Musculoskeletal: Positive for arthralgias and back pain.     Objective:     Vital Signs (Most Recent):  Temp: 98.8 °F (37.1 °C) (04/29/19 0359)  Pulse: 97 (04/29/19 0359)  Resp: 20 (04/29/19 0359)  BP: 124/71 (04/29/19 0359)  SpO2: 95 % (04/29/19 0359) Vital Signs (24h Range):  Temp:  [97.7 °F (36.5 °C)-99.7 °F (37.6 °C)] 98.8 °F (37.1 °C)  Pulse:  [] 97  Resp:  [18-22] 20  SpO2:  [93 %-99 %] 95 %  BP: (124-168)/() 124/71     Weight: 136.1 kg (300 lb)  Body mass index is 54.87 kg/m².  No intake or output data in the 24 hours ending 04/29/19 0647   Physical Exam   Constitutional: She is oriented to person, place, and time. She appears well-developed and well-nourished. No distress.   HENT:   Head: Normocephalic and atraumatic.   Eyes: No scleral icterus.   Neck: Normal range of motion. Neck supple.   Cardiovascular: Normal rate, regular rhythm, normal heart sounds and intact distal pulses. Exam reveals no friction rub.   No murmur heard.  Pulmonary/Chest: Effort normal. No respiratory distress. She has wheezes (left lung fields).   Abdominal: Soft. Bowel sounds are normal. She exhibits no distension. There is no tenderness. There is no guarding.   Musculoskeletal: Normal range of motion. She exhibits tenderness (bilateral knees and ankles). She exhibits no edema.   Neurological: She is oriented to person, place, and time. No sensory deficit.   Skin: Skin is warm and dry. No rash noted. No erythema. No pallor.   Nursing note and vitals reviewed.      Significant Labs: All  pertinent labs within the past 24 hours have been reviewed.    Significant Imaging: I have reviewed all pertinent imaging results/findings within the past 24 hours.

## 2019-04-29 NOTE — MEDICAL/APP STUDENT
Pt desaturation on oxygen     Pt reported an O2 desat to 83 when walked yesterday    Pt examined today    O2 sat 95% = When sitting comfortably on 3L O2 NC   O2 sat 92% = When sitting comfortably on RA  O2 sat 88% = While walking down cuellar on 3L O2 NC     Nicholas Justin MS3  4/29/2019

## 2019-04-29 NOTE — PT/OT/SLP PROGRESS
Physical Therapy      Patient Name:  Nazanin Malone   MRN:  [id number]    Attempted to visit pt in AM for evaluation - AM: pt working c RT; AM: pt c continued c/o SOB, request PRN RT breathing treatment and RN request hold. Will follow-up as schedule allows.    Joce Laura, PT   4/29/2019

## 2019-04-29 NOTE — PROGRESS NOTES
Notified medicine team 5 about stopping continuous fluid infusion until new IV will be placed by PICC team(pt has poor venous access).

## 2019-04-30 LAB
ALBUMIN SERPL BCP-MCNC: 3.5 G/DL (ref 3.5–5.2)
ALP SERPL-CCNC: 81 U/L (ref 55–135)
ALT SERPL W/O P-5'-P-CCNC: 16 U/L (ref 10–44)
ANION GAP SERPL CALC-SCNC: 12 MMOL/L (ref 8–16)
ANION GAP SERPL CALC-SCNC: 7 MMOL/L (ref 8–16)
ANISOCYTOSIS BLD QL SMEAR: SLIGHT
AST SERPL-CCNC: 39 U/L (ref 10–40)
B-HCG UR QL: NEGATIVE
BACTERIA BLD CULT: NORMAL
BACTERIA BLD CULT: NORMAL
BASOPHILS # BLD AUTO: 0.03 K/UL (ref 0–0.2)
BASOPHILS NFR BLD: 0.4 % (ref 0–1.9)
BILIRUB SERPL-MCNC: 0.3 MG/DL (ref 0.1–1)
BUN SERPL-MCNC: 6 MG/DL (ref 6–20)
BUN SERPL-MCNC: 6 MG/DL (ref 6–20)
CALCIUM SERPL-MCNC: 9 MG/DL (ref 8.7–10.5)
CALCIUM SERPL-MCNC: 9.1 MG/DL (ref 8.7–10.5)
CHLORIDE SERPL-SCNC: 101 MMOL/L (ref 95–110)
CHLORIDE SERPL-SCNC: 103 MMOL/L (ref 95–110)
CO2 SERPL-SCNC: 23 MMOL/L (ref 23–29)
CO2 SERPL-SCNC: 31 MMOL/L (ref 23–29)
CREAT SERPL-MCNC: 0.7 MG/DL (ref 0.5–1.4)
CREAT SERPL-MCNC: 0.7 MG/DL (ref 0.5–1.4)
DIFFERENTIAL METHOD: ABNORMAL
EOSINOPHIL # BLD AUTO: 0.1 K/UL (ref 0–0.5)
EOSINOPHIL NFR BLD: 1.9 % (ref 0–8)
ERYTHROCYTE [DISTWIDTH] IN BLOOD BY AUTOMATED COUNT: 16.9 % (ref 11.5–14.5)
EST. GFR  (AFRICAN AMERICAN): >60 ML/MIN/1.73 M^2
EST. GFR  (AFRICAN AMERICAN): >60 ML/MIN/1.73 M^2
EST. GFR  (NON AFRICAN AMERICAN): >60 ML/MIN/1.73 M^2
EST. GFR  (NON AFRICAN AMERICAN): >60 ML/MIN/1.73 M^2
GLUCOSE SERPL-MCNC: 53 MG/DL (ref 70–110)
GLUCOSE SERPL-MCNC: 94 MG/DL (ref 70–110)
HCT VFR BLD AUTO: 33.9 % (ref 37–48.5)
HGB BLD-MCNC: 10.9 G/DL (ref 12–16)
HYPOCHROMIA BLD QL SMEAR: ABNORMAL
IMM GRANULOCYTES # BLD AUTO: 0.02 K/UL (ref 0–0.04)
IMM GRANULOCYTES NFR BLD AUTO: 0.3 % (ref 0–0.5)
LYMPHOCYTES # BLD AUTO: 3.2 K/UL (ref 1–4.8)
LYMPHOCYTES NFR BLD: 47.6 % (ref 18–48)
MCH RBC QN AUTO: 22.7 PG (ref 27–31)
MCHC RBC AUTO-ENTMCNC: 32.2 G/DL (ref 32–36)
MCV RBC AUTO: 71 FL (ref 82–98)
MONOCYTES # BLD AUTO: 0.7 K/UL (ref 0.3–1)
MONOCYTES NFR BLD: 10.9 % (ref 4–15)
NEUTROPHILS # BLD AUTO: 2.6 K/UL (ref 1.8–7.7)
NEUTROPHILS NFR BLD: 38.9 % (ref 38–73)
NRBC BLD-RTO: 1 /100 WBC
OVALOCYTES BLD QL SMEAR: ABNORMAL
PLATELET # BLD AUTO: 174 K/UL (ref 150–350)
PMV BLD AUTO: 10.6 FL (ref 9.2–12.9)
POIKILOCYTOSIS BLD QL SMEAR: SLIGHT
POLYCHROMASIA BLD QL SMEAR: ABNORMAL
POTASSIUM SERPL-SCNC: 3.8 MMOL/L (ref 3.5–5.1)
POTASSIUM SERPL-SCNC: 5.5 MMOL/L (ref 3.5–5.1)
PROT SERPL-MCNC: 8.1 G/DL (ref 6–8.4)
RBC # BLD AUTO: 4.8 M/UL (ref 4–5.4)
SODIUM SERPL-SCNC: 138 MMOL/L (ref 136–145)
SODIUM SERPL-SCNC: 139 MMOL/L (ref 136–145)
TARGETS BLD QL SMEAR: ABNORMAL
WBC # BLD AUTO: 6.77 K/UL (ref 3.9–12.7)

## 2019-04-30 PROCEDURE — 85025 COMPLETE CBC W/AUTO DIFF WBC: CPT

## 2019-04-30 PROCEDURE — 94664 DEMO&/EVAL PT USE INHALER: CPT

## 2019-04-30 PROCEDURE — 99232 PR SUBSEQUENT HOSPITAL CARE,LEVL II: ICD-10-PCS | Mod: ,,, | Performed by: INTERNAL MEDICINE

## 2019-04-30 PROCEDURE — 99900035 HC TECH TIME PER 15 MIN (STAT)

## 2019-04-30 PROCEDURE — 93005 ELECTROCARDIOGRAM TRACING: CPT

## 2019-04-30 PROCEDURE — 81025 URINE PREGNANCY TEST: CPT

## 2019-04-30 PROCEDURE — 97161 PT EVAL LOW COMPLEX 20 MIN: CPT

## 2019-04-30 PROCEDURE — 99232 SBSQ HOSP IP/OBS MODERATE 35: CPT | Mod: ,,, | Performed by: INTERNAL MEDICINE

## 2019-04-30 PROCEDURE — 94761 N-INVAS EAR/PLS OXIMETRY MLT: CPT

## 2019-04-30 PROCEDURE — 93010 EKG 12-LEAD: ICD-10-PCS | Mod: ,,, | Performed by: INTERNAL MEDICINE

## 2019-04-30 PROCEDURE — 94640 AIRWAY INHALATION TREATMENT: CPT

## 2019-04-30 PROCEDURE — 63600175 PHARM REV CODE 636 W HCPCS: Performed by: STUDENT IN AN ORGANIZED HEALTH CARE EDUCATION/TRAINING PROGRAM

## 2019-04-30 PROCEDURE — 27000221 HC OXYGEN, UP TO 24 HOURS

## 2019-04-30 PROCEDURE — 80048 BASIC METABOLIC PNL TOTAL CA: CPT

## 2019-04-30 PROCEDURE — 94668 MNPJ CHEST WALL SBSQ: CPT

## 2019-04-30 PROCEDURE — 94799 UNLISTED PULMONARY SVC/PX: CPT

## 2019-04-30 PROCEDURE — 25000242 PHARM REV CODE 250 ALT 637 W/ HCPCS: Performed by: STUDENT IN AN ORGANIZED HEALTH CARE EDUCATION/TRAINING PROGRAM

## 2019-04-30 PROCEDURE — 25000003 PHARM REV CODE 250: Performed by: STUDENT IN AN ORGANIZED HEALTH CARE EDUCATION/TRAINING PROGRAM

## 2019-04-30 PROCEDURE — 36415 COLL VENOUS BLD VENIPUNCTURE: CPT

## 2019-04-30 PROCEDURE — 25000003 PHARM REV CODE 250: Performed by: INTERNAL MEDICINE

## 2019-04-30 PROCEDURE — 27000646 HC AEROBIKA DEVICE

## 2019-04-30 PROCEDURE — 93010 ELECTROCARDIOGRAM REPORT: CPT | Mod: ,,, | Performed by: INTERNAL MEDICINE

## 2019-04-30 PROCEDURE — 11000001 HC ACUTE MED/SURG PRIVATE ROOM

## 2019-04-30 PROCEDURE — 80053 COMPREHEN METABOLIC PANEL: CPT

## 2019-04-30 RX ORDER — SODIUM CHLORIDE FOR INHALATION 3 %
4 VIAL, NEBULIZER (ML) INHALATION 2 TIMES DAILY
Status: DISCONTINUED | OUTPATIENT
Start: 2019-04-30 | End: 2019-05-04 | Stop reason: HOSPADM

## 2019-04-30 RX ORDER — LEVOFLOXACIN 750 MG/1
750 TABLET ORAL DAILY
Status: DISCONTINUED | OUTPATIENT
Start: 2019-04-30 | End: 2019-05-02

## 2019-04-30 RX ADMIN — STANDARDIZED SENNA CONCENTRATE AND DOCUSATE SODIUM 1 TABLET: 8.6; 5 TABLET, FILM COATED ORAL at 08:04

## 2019-04-30 RX ADMIN — HYDROMORPHONE HYDROCHLORIDE 1 MG: 1 INJECTION, SOLUTION INTRAMUSCULAR; INTRAVENOUS; SUBCUTANEOUS at 08:04

## 2019-04-30 RX ADMIN — HYDROMORPHONE HYDROCHLORIDE 1 MG: 1 INJECTION, SOLUTION INTRAMUSCULAR; INTRAVENOUS; SUBCUTANEOUS at 02:04

## 2019-04-30 RX ADMIN — CARBAMAZEPINE 800 MG: 200 TABLET ORAL at 09:04

## 2019-04-30 RX ADMIN — IPRATROPIUM BROMIDE AND ALBUTEROL SULFATE 3 ML: .5; 3 SOLUTION RESPIRATORY (INHALATION) at 11:04

## 2019-04-30 RX ADMIN — HYDROMORPHONE HYDROCHLORIDE 1 MG: 1 INJECTION, SOLUTION INTRAMUSCULAR; INTRAVENOUS; SUBCUTANEOUS at 12:04

## 2019-04-30 RX ADMIN — DIPHENHYDRAMINE HYDROCHLORIDE 25 MG: 25 CAPSULE ORAL at 08:04

## 2019-04-30 RX ADMIN — ENOXAPARIN SODIUM 40 MG: 100 INJECTION SUBCUTANEOUS at 08:04

## 2019-04-30 RX ADMIN — GABAPENTIN 800 MG: 400 CAPSULE ORAL at 08:04

## 2019-04-30 RX ADMIN — DIPHENHYDRAMINE HYDROCHLORIDE 25 MG: 25 CAPSULE ORAL at 06:04

## 2019-04-30 RX ADMIN — ALBUTEROL SULFATE 2.5 MG: 2.5 SOLUTION RESPIRATORY (INHALATION) at 06:04

## 2019-04-30 RX ADMIN — IPRATROPIUM BROMIDE AND ALBUTEROL SULFATE 3 ML: .5; 3 SOLUTION RESPIRATORY (INHALATION) at 04:04

## 2019-04-30 RX ADMIN — LEVOFLOXACIN 750 MG: 750 TABLET, FILM COATED ORAL at 12:04

## 2019-04-30 RX ADMIN — SODIUM CHLORIDE SOLN NEBU 3% 4 ML: 3 NEBU SOLN at 02:04

## 2019-04-30 RX ADMIN — SODIUM CHLORIDE: 0.45 INJECTION, SOLUTION INTRAVENOUS at 12:04

## 2019-04-30 RX ADMIN — FLUOXETINE 40 MG: 20 CAPSULE ORAL at 09:04

## 2019-04-30 RX ADMIN — GUAIFENESIN AND DEXTROMETHORPHAN HYDROBROMIDE 1 TABLET: 600; 30 TABLET, EXTENDED RELEASE ORAL at 08:04

## 2019-04-30 RX ADMIN — AMOXICILLIN AND CLAVULANATE POTASSIUM 1 TABLET: 875; 125 TABLET, FILM COATED ORAL at 09:04

## 2019-04-30 RX ADMIN — PREDNISONE 40 MG: 20 TABLET ORAL at 09:04

## 2019-04-30 RX ADMIN — DIPHENHYDRAMINE HYDROCHLORIDE 25 MG: 25 CAPSULE ORAL at 09:04

## 2019-04-30 RX ADMIN — GABAPENTIN 800 MG: 400 CAPSULE ORAL at 09:04

## 2019-04-30 RX ADMIN — SODIUM CHLORIDE: 0.45 INJECTION, SOLUTION INTRAVENOUS at 05:04

## 2019-04-30 RX ADMIN — AMLODIPINE BESYLATE 10 MG: 10 TABLET ORAL at 09:04

## 2019-04-30 RX ADMIN — IPRATROPIUM BROMIDE AND ALBUTEROL SULFATE 3 ML: .5; 3 SOLUTION RESPIRATORY (INHALATION) at 08:04

## 2019-04-30 RX ADMIN — SODIUM CHLORIDE SOLN NEBU 3% 4 ML: 3 NEBU SOLN at 08:04

## 2019-04-30 RX ADMIN — GUAIFENESIN AND DEXTROMETHORPHAN HYDROBROMIDE 1 TABLET: 600; 30 TABLET, EXTENDED RELEASE ORAL at 09:04

## 2019-04-30 RX ADMIN — STANDARDIZED SENNA CONCENTRATE AND DOCUSATE SODIUM 1 TABLET: 8.6; 5 TABLET, FILM COATED ORAL at 09:04

## 2019-04-30 RX ADMIN — HYDROMORPHONE HYDROCHLORIDE 1 MG: 1 INJECTION, SOLUTION INTRAMUSCULAR; INTRAVENOUS; SUBCUTANEOUS at 06:04

## 2019-04-30 RX ADMIN — ENOXAPARIN SODIUM 40 MG: 100 INJECTION SUBCUTANEOUS at 09:04

## 2019-04-30 RX ADMIN — CARBAMAZEPINE 800 MG: 200 TABLET ORAL at 08:04

## 2019-04-30 RX ADMIN — HYDROMORPHONE HYDROCHLORIDE 1 MG: 1 INJECTION, SOLUTION INTRAMUSCULAR; INTRAVENOUS; SUBCUTANEOUS at 01:04

## 2019-04-30 NOTE — PHARMACY MED REC
"Admission Medication Reconciliation - Pharmacy Consult Note    The home medication history was taken by Susannah Floyd, Pharmacy Technician.  Based on information gathered and subsequent review by the clinical pharmacist, the items below may need attention.     You may go to "Admission" then "Reconcile Home Medications" tabs to review and/or act upon these items.     Potentially problematic discrepancies with current MAR  o Patient IS taking the following which was not ordered upon admit  o Ergocalciferol 50,000 units PO Tuesdays    Potential issues to be addressed PRIOR TO DISCHARGE  o Patient request:  o Needs nebulizer machine      Please address this information as you see fit.  Feel free to contact us if you have any questions or require assistance.    Marie Paris, PharmD  [id number]                  .    .            "

## 2019-04-30 NOTE — PLAN OF CARE
Problem: Physical Therapy Goal  Goal: Physical Therapy Goal  Goals to be met by: 5/10/2019     Patient will increase functional independence with mobility by performin. Gait  x 150 feet with Modified [place] using LRAD  2. Ascend/descend 15 stair with bilateral Handrails Modified [place]  Outcome: Ongoing (interventions implemented as appropriate)  Eval completed and POC established    Joce Laura PT,DPT  2019

## 2019-04-30 NOTE — PT/OT/SLP EVAL
"Physical Therapy Evaluation    Patient Name:  Nazanin Malone   MRN:  [id number]    Recommendations:     Discharge Recommendations:  home   Discharge Equipment Recommendations: none   Barriers to discharge: Inaccessible home    Assessment:     Nazanin Malone is a 39 y.o. female admitted with a medical diagnosis of Sickle cell disease, type S beta-zero thalassemia, with acute chest syndrome.  She presents with the following impairments/functional limitations:  impaired endurance, impaired cardiopulmonary response to activity.  Pt demo decreased functional mobility secondary to impaired cardiopulmonary response to activity.  Activity limited on this date d/t c/o fatigue and pt lethargic throughout session.  Pt safe to ambulate in room c assistance of 1x person. Pt would benefit from continued skilled acute PT 2x/wk to improve functional mobility.  Recommending pt return Home c no needs.    Rehab Prognosis: Good; patient would benefit from acute skilled PT services to address these deficits and reach maximum level of function.    Recent Surgery: * No surgery found *      Plan:     During this hospitalization, patient to be seen 2 x/week to address the identified rehab impairments via gait training, therapeutic activities, therapeutic exercises, neuromuscular re-education and progress toward the following goals:    · Plan of Care Expires:  05/25/19    Subjective     Chief Complaint: fatigue  Patient/Family Comments/goals: "I didn't sleep at all yesterday because I couldn't breathe."  Pain/Comfort:  · Pain Rating 1: 0/10    Patients cultural, spiritual, Restorationism conflicts given the current situation: no    Living Environment:  Pt lives c mother and 2 daughters in [facility] c [address].  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  Prior to admission, patients level of function was independent.  Equipment used at home: none.  DME owned (not currently used): none.  Upon discharge, patient will have assistance from " family.    Objective:     Communicated with RN prior to session.  Patient found HOB elevated with peripheral IV, telemetry, oxygen, pulse ox (continuous)  upon PT entry to room.    General Precautions: Standard, fall   Orthopedic Precautions:N/A   Braces: N/A     Exams:  · Cognitive Exam:  Patient is oriented to Person, Place, Time and Situation  · RLE ROM: WFL  · RLE Strength: WFL  · LLE ROM: WFL  · LLE Strength: WFL    Functional Mobility:  · Bed Mobility:     · Rolling Left:  independence  · Scooting: independence  · Supine to Sit: independence  · Sit to Supine: independence  · Transfers:  Sit to Stand:  independence with no AD  · Gait: 5ft F/B c no AD (S)  · Balance: standing (S)      Therapeutic Activities and Exercises:   Pt educated on: PT role/POC; safety c mobility; benefits of OOB activities; performing therex; d/c recs - v/u      AM-PAC 6 CLICK MOBILITY  Total Score:22     Patient left HOB elevated with all lines intact, call button in reach and RN notified.    GOALS:   Multidisciplinary Problems     Physical Therapy Goals        Problem: Physical Therapy Goal    Goal Priority Disciplines Outcome Goal Variances Interventions   Physical Therapy Goal     PT, PT/OT Ongoing (interventions implemented as appropriate)     Description:  Goals to be met by: 5/10/2019     Patient will increase functional independence with mobility by performin. Gait  x 150 feet with Modified Cannon using LRAD  2. Ascend/descend 15 stair with bilateral Handrails Modified Cannon                    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    Asthma     Depression     Hypertension     Morbid obesity     Opioid dependence 2017    Pneumonia due to Streptococcus pneumoniae 2017    Sepsis due to Streptococcus pneumoniae 2017    Sickle cell-beta thalassemia disease with pain      Trigeminal neuralgia        Past Surgical History:   Procedure Laterality Date     SECTION      TONSILLECTOMY      TUBAL LIGATION         Time Tracking:     PT Received On: 19  PT Start Time: 1018     PT Stop Time: 1026  PT Total Time (min): 8 min     Billable Minutes: Evaluation 8 min      Joce Laura, PT  2019

## 2019-04-30 NOTE — SUBJECTIVE & OBJECTIVE
Interval History:  No acute events overnight. Patients potassium is 5.5 this morning, repeated, was 3.8.    Review of Systems   Constitutional: Negative for fever.   Respiratory: Positive for cough and shortness of breath.    Cardiovascular: Positive for chest pain. Negative for palpitations.   Gastrointestinal: Negative for abdominal pain.   Musculoskeletal: Positive for arthralgias and back pain.     Objective:     Vital Signs (Most Recent):  Temp: 99 °F (37.2 °C) (04/30/19 0613)  Pulse: 91 (04/30/19 0618)  Resp: 20 (04/30/19 0618)  BP: (!) 160/93 (04/30/19 0613)  SpO2: 97 % (04/30/19 0618) Vital Signs (24h Range):  Temp:  [97.1 °F (36.2 °C)-99.2 °F (37.3 °C)] 99 °F (37.2 °C)  Pulse:  [] 91  Resp:  [14-24] 20  SpO2:  [93 %-98 %] 97 %  BP: (139-179)/() 160/93     Weight: 136.1 kg (300 lb)  Body mass index is 54.87 kg/m².    Intake/Output Summary (Last 24 hours) at 4/30/2019 0640  Last data filed at 4/29/2019 1208  Gross per 24 hour   Intake 0 ml   Output --   Net 0 ml      Physical Exam   Constitutional: She is oriented to person, place, and time. She appears well-developed and well-nourished. No distress.   HENT:   Head: Normocephalic and atraumatic.   Eyes: No scleral icterus.   Neck: Normal range of motion. Neck supple.   Cardiovascular: Normal rate, regular rhythm, normal heart sounds and intact distal pulses. Exam reveals no friction rub.   No murmur heard.  Pulmonary/Chest: Effort normal. No respiratory distress. She has wheezes (left lung fields).   Abdominal: Soft. Bowel sounds are normal. She exhibits no distension. There is no tenderness. There is no guarding.   Musculoskeletal: Normal range of motion. She exhibits tenderness (bilateral knees and ankles). She exhibits no edema.   Neurological: She is oriented to person, place, and time. No sensory deficit.   Skin: Skin is warm and dry. No rash noted. No erythema. No pallor.   Nursing note and vitals reviewed.      Significant Labs: All  pertinent labs within the past 24 hours have been reviewed.    Significant Imaging: I have reviewed all pertinent imaging results/findings within the past 24 hours.

## 2019-04-30 NOTE — PROGRESS NOTES
Escalated call to IM 5 regarding patients pain and request for breakthrough pain med. Resident stated he will round on patient soon.

## 2019-04-30 NOTE — PROGRESS NOTES
Had paged IM5 to notify pt is requesting and additional one time dose of IV dilaudid for pain rating of 10 out of 10.  Spoke with MD and he said he will come right up to speak with pt, however, will not order a one time dose.  Notified pt.

## 2019-04-30 NOTE — PHYSICIAN QUERY
PT Name: Nazanin Malone  MR #: [id number]    Physician Query Form - Respiratory Condition Clarification      CDS/: Lashawn Cardenas RN          Contact information: [email or web address]    This form is a permanent document in the medical record.    Query Date: 2019    By submitting this query, we are merely seeking further clarification of documentation. Please utilize your independent clinical judgment when addressing the question(s) below.    The Medical record contains the following   Indicators   Supporting Clinical Findings Location in Medical Record   X   SOB, ALLEN, Wheezing, Productive Cough, Use of Accessory Muscles, etc. Positive for cough (productive green mucous) and shortness of breath Hospital medicine    X   Acute/Chronic Illness Ms. Malone is a 37 year old lady with a past medical history significant for sickle cell beta-thalassemia, asthma, HTN, and trigeminal neuralgia      Hospital medicine    X   Radiology Findings The lungs are clear, with normal appearance of pulmonary vasculature and no pleural effusion or pneumothorax. CXR    X   Respiratory Distress or Failure Acute hypoxic respiratory failure  -desaturations when walking, will obtain CTA, suspect PNA complicated by sickle cell   -continue oxygen as needed Hospital medicine       Hypoxia or Hypercapnia     X   RR         ABGs         O2 sat RR 20, O2 sat 98%   RR 20, O2 Sat 98%    O2 sat  86 % , 91%      AB.371, PCO2 48.4, PO2 60, HCO3 28.1, sat O2 90  TCO2 30 H & P vitals  [facility] medicine vitals     Nurse flow sheet  (ambulating in cuellar    ABG       BiPAP/Intubation     X   Supplemental O2 Resp called. Pt states she can't breathe. Oxygen on 2l per nasal cannula. Spo2 97%. Oxygen increased per RT request until he reaches the room. Report given to new nurse per pt request.   Nurses note       Home O2, Oxygen Dependence     X   Treatment -continue oxygen as needed Hospital medicine  4/27   X   Other Mild intermittent asthma with acute exacerbation  -treating with prednisone 40mg for 5 days Hospital medicine 4/29     Respiratory failure can be acute, chronic or both. It is generally further specified as hypoxic, hypercapnic or both. Lastly, it is important to identify an etiology, if known or suspected.   References::  https://www.acphospitalist.org/archives/2013/10/coding.htm http://[employer] Reviewed/acute-respiratory-failure-know     The clinical guidelines noted below are only system guidelines, and do not replace the providers clinical judgment.    Provider, please specify diagnosis or diagnoses associated with above clinical findings.   [  x ] Acute Respiratory Failure with Hypoxia - ABG pO2 < 60 mmHg or O2 sat of 88% on RA and respiratory symptoms documented   [   ] Other Acute Respiratory Failure     [   ] Acute Respiratory Insufficiency - Generally describes less severe respiratory symptoms and measurements (pO2, SpO2, pH, and pCO2) NOT meeting criteria for respiratory failure     [   ] Acute Respiratory Distress - Generally describes less severe respiratory symptoms (tachypnea, in respiratory distress, increased work of breathing, unable to speak in complete sentences, labored breathing, use of accessory muscles, RR> 24, cyanosis, dyspnea, wheezing, stridor, lethargy) without sufficient measurements (pO2, SpO2, pH, and pCO2) to meet criteria for respiratory failure    [   ] Hypoxia Only   [   ] Other Respiratory Diagnosis (please specify): _________________________________   [   ]  Clinically Undetermined       Please document in your progress notes daily for the duration of treatment until resolved and include in your discharge summary.

## 2019-04-30 NOTE — PROGRESS NOTES
Received another call from pt asking if Md had returned call.  Still have not received a call back about pain and MD has not come to bedside.  Notified charge nurse MD has been paged 3 times in the last hour and have not received a call back.  Charge nurse to page team and discuss pt's pain request.

## 2019-04-30 NOTE — PROGRESS NOTES
[facility] Hospital Medicine  Progress Note    Patient Name: Nazanin Malone  MRN: [id number]  Patient Class: IP- Inpatient   Admission Date: 4/25/2019  Length of Stay: 5 days  Attending Physician: Cortney Arellano MD  Primary Care Provider: Balta Forbes MD    [facility] Medicine Team: [facility] HOSP MED 5 Raymond Aparicio MD    Subjective:     Principal Problem:Sickle cell disease, type S beta-zero thalassemia, with acute chest syndrome    HPI:  Ms. Malone is a 37 year old lady with a past medical history significant for sickle cell beta-thalassemia, asthma, HTN, and trigeminal neuralgia who presents to [facility] ED with complaints of cough, shortness of breath, and body pain. The patient states symptoms began this morning with a headache. She took home dose of percocet 10mg that did not help her symptoms. She then began to notice bilateral leg and toe, bilateral hip, left arm, and chest pain with breathing which prompted her to come in to the ED for further evaluation. The patient also endorsed associated shortness of breath. She endorses a cough productive of thick white sputum that began overnight as well as fevers (103.1) and chills at home. 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 She endorses good PO intake. She has a history of asthma and tried her albuterol rescue inhaler that did not improve her shortness of breath. The patient is currently on her menstrual cycle and she endorses heavy flow with associated clots which is normal for her. She states that her sickle cell often flares when she is on her cycle. She reports one previous episode of acute chest syndrome in 2017.    The patient follows with Hematology at [facility] for her Sickle beta-thalassemia, last seen in clinic on 1/9/19. Her baseline Hg is 11.8-12.0 and she states she has only ever needed a transfusion when she was 13 years old or so. She had previously required iron infusions in the  past, most recently in July 2018 but has not needed them since. She was previously on hydroxyurea but stopped the medication on her own due to complaints of rash. She was offered L-glutamine but it was not covered by insurance.    In the ED the patient was febrile and required IV narcotics for pain relief. CXR did not show any focal consolidation. She O2 sat was 99% on room air. Given fever and chest pain she was given 1x dose of rocefin and IVFs She was admitted to hospital medicine for sickle cell pain crisis.      Hospital Course:  Patient admitted to hospital medicine for sickle cell pain crisis and shortness of breath. Initially, patient was treated with prn dilaudid and oral oxycodone. She was also started on continuous IVF in the form of lactate ringers and supplemental O2 as needed. Patient still complains of pain, although she primarily uses dilaudid and not much of her oral medication. There was some concern for acute chest, however heme/onc was consulted, reports that this is unlikely to be acute chest syndrome. On the night on 4/27, patient had an infiltrated IV when getting contrast for CT scan. Right arm is swollen, but pulses are present, full ROM, and sensation is intact. This was treated with elevation and alternating hot and cold packs. Swelling on the right forearm reduced considerably. In addition, sputum cultures were collected showing moderate gram negative rods, few gram positive cocci and rare gram positive rods. Changed antibiotics to Levaquin.         Interval History:  No acute events overnight. Patients potassium is 5.5 this morning, repeated, was 3.8.    Review of Systems   Constitutional: Negative for fever.   Respiratory: Positive for cough and shortness of breath.    Cardiovascular: Positive for chest pain. Negative for palpitations.   Gastrointestinal: Negative for abdominal pain.   Musculoskeletal: Positive for arthralgias and back pain.     Objective:     Vital Signs (Most  Recent):  Temp: 99 °F (37.2 °C) (04/30/19 0613)  Pulse: 91 (04/30/19 0618)  Resp: 20 (04/30/19 0618)  BP: (!) 160/93 (04/30/19 0613)  SpO2: 97 % (04/30/19 0618) Vital Signs (24h Range):  Temp:  [97.1 °F (36.2 °C)-99.2 °F (37.3 °C)] 99 °F (37.2 °C)  Pulse:  [] 91  Resp:  [14-24] 20  SpO2:  [93 %-98 %] 97 %  BP: (139-179)/() 160/93     Weight: 136.1 kg (300 lb)  Body mass index is 54.87 kg/m².    Intake/Output Summary (Last 24 hours) at 4/30/2019 0640  Last data filed at 4/29/2019 1208  Gross per 24 hour   Intake 0 ml   Output --   Net 0 ml      Physical Exam   Constitutional: She is oriented to person, place, and time. She appears well-developed and well-nourished. No distress.   HENT:   Head: Normocephalic and atraumatic.   Eyes: No scleral icterus.   Neck: Normal range of motion. Neck supple.   Cardiovascular: Normal rate, regular rhythm, normal heart sounds and intact distal pulses. Exam reveals no friction rub.   No murmur heard.  Pulmonary/Chest: Effort normal. No respiratory distress. She has wheezes (left lung fields).   Abdominal: Soft. Bowel sounds are normal. She exhibits no distension. There is no tenderness. There is no guarding.   Musculoskeletal: Normal range of motion. She exhibits tenderness (bilateral knees and ankles). She exhibits no edema.   Neurological: She is oriented to person, place, and time. No sensory deficit.   Skin: Skin is warm and dry. No rash noted. No erythema. No pallor.   Nursing note and vitals reviewed.      Significant Labs: All pertinent labs within the past 24 hours have been reviewed.    Significant Imaging: I have reviewed all pertinent imaging results/findings within the past 24 hours.    Assessment/Plan:      * Sickle cell disease, type S beta-zero thalassemia, with acute chest syndrome  Patient with history of Sickle cell-beta thalassemia, presenting with acute pain crisis. Did not respond to outpatient PO oxy 10mg. Patient reporting fevers, chills, and cough  "night before presentation. Cough productive of white sputum, patient febrile and tachycardic on presentation. Flu negative. Works as a nurse, was caring for multiple patients with pneumonia. CXR negative for consolidation, however given history and chest pain was given dose of ceftriaxone in ED.  Patient also currently on menstrual period and she reports having significant, heavy flow. Likely exacerbating symptoms. Reticulocyte count 2.7. Patient not on hydroxyurea due to side effects.    Baseline Hg 11.8-12.0 per chart review, at baseline on presentation today. Given fevers, chest pain, and cough concerning for acute chest.   -Assessed by Heme/onc, not likely she is having acute sickle cell crisis given low retic count & stable hb, HBS quantitative in process    Plan:  - ABX: Azithromycin course complete. Changing Augmentin to Levaquin   - Pain control: prn dilaudid IV 1 mg q4 and oxycodone 10 mg q6  - IV fluids .45 saline at 125 ml/hr  - Supplemental O2, incentive spirometry and Acapella treatments ordered  - Albuterol nebs prn for wheezing  - DVT Prophylaxis: Lovenox       Forearm swelling  - Due to IV infiltration when administering contrast for CT chest  - U/S negative for clots in arterial or venous vessels  - Will continue alternating hot and cold pack with elevation. Slightly difficult as patient is getting fluid for sickle cell crisis   - 4/29, much improved, will continue to monitor      Mild intermittent asthma with acute exacerbation  - treating with prednisone 40 mg for 5 days (complete)      Anxiety  Patient states she has significant anxiety, describes subclinical "stroke" in past however after discussing with patient she believes it was 2/2 anxiety as her daughter had brought a stranger into their home while the patient was hospitalized. She was experiencing left arm and leg weakness, eye twitching, and difficulty speaking. Symptoms at that time resolved acutely with prn hydralazine. CT head was " negative.    Previously on klonopin and remeron however she states she no longer takes them as symptoms well controlled on fluoxetine 40mg    - Continued home fluoxetine      Morbid obesity due to excess calories  Cardiac diet.  a1c of 5.4      Benign essential HTN  Patient on Norvasc 10mg and HCTZ 25mg at home Hypertensive and tachycardic on presentation, likely 2/2 pain. Patient did not take HCTZ prior to admit.    - Resumed norvasc 10mg  - Holding HCTZ at this time as patient currently in acute vaso-occlusive crisis, would D/C as intravascular depletion could precipitate sickle cell pain crisis in future      Iron deficiency anemia  Patient with prior history of iron deficiency, presumed due to heavy menses. Last Ferritin in 1/2019 was 29. Followed by hematology, required infusion in the past, most recently in 7/2018.    Hg on admit 11.8 at baseline per chart review. MCV 69, however patient with Beta thalassemia. Ferritin would likely be elevated 2/2 acute crisis so unlikely helpful in assessing iron status at this time    - CBC daily  - Consider repeat Iron studies once acute chest resolves        VTE Risk Mitigation (From admission, onward)        Ordered     enoxaparin injection 40 mg  Every 12 hours      04/26/19 0014     IP VTE HIGH RISK PATIENT  Once      04/26/19 0014              Raymond Aparicio MD  Department of Hospital Medicine   [facility]

## 2019-04-30 NOTE — ASSESSMENT & PLAN NOTE
Patient with history of Sickle cell-beta thalassemia, presenting with acute pain crisis. Did not respond to outpatient PO oxy 10mg. Patient reporting fevers, chills, and cough night before presentation. Cough productive of white sputum, patient febrile and tachycardic on presentation. Flu negative. Works as a nurse, was caring for multiple patients with pneumonia. CXR negative for consolidation, however given history and chest pain was given dose of ceftriaxone in ED.  Patient also currently on menstrual period and she reports having significant, heavy flow. Likely exacerbating symptoms. Reticulocyte count 2.7. Patient not on hydroxyurea due to side effects.    Baseline Hg 11.8-12.0 per chart review, at baseline on presentation today. Given fevers, chest pain, and cough concerning for acute chest.   -Assessed by Heme/onc, not likely she is having acute sickle cell crisis given low retic count & stable hb, HBS quantitative in process    Plan:  - ABX: Azithromycin course complete. Changing Augmentin to Levaquin   - Pain control: prn dilaudid IV 1 mg q4 and oxycodone 10 mg q6  - IV fluids .45 saline at 125 ml/hr  - Supplemental O2, incentive spirometry and Acapella treatments ordered  - Albuterol nebs prn for wheezing  - DVT Prophylaxis: Lovenox

## 2019-04-30 NOTE — PROGRESS NOTES
Received call from pt asking if the doctor was coming still to the bedside and asked nurse to page team again regarding pain med. Neymar MARTI. Awaiting return call

## 2019-04-30 NOTE — PLAN OF CARE
Problem: Adult Inpatient Plan of Care  Goal: Patient-Specific Goal (Individualization)  Pt free of falls and injury. Pt AAOx4. Pain managed with PRN Dilaudid x2. Respiratory treatment provided PRN by RT,bed low, breaks locked, SR up x2, call light within reach, will continue to monitor.

## 2019-04-30 NOTE — PHYSICIAN QUERY
PT Name: Nazanin Malone  MR #: [id number]    Physician Query Form - Relationship to Procedure Clarification     CDS/: Lashawn Cardenas RN             Contact information: [email or web address]    This form is a permanent document in the medical record.     Query Date: April 30, 2019      By submitting this query, we are merely seeking further clarification of documentation. Please utilize your independent clinical judgment when addressing the question(s) below.    The Medical record contains the following:  Supporting Clinical Findings Location in Medical Record   MD paged regarding R forearm swelling. Earlier patient underwent unsuccessful CTA due to infiltration of IV contrast. Radiology note reports approximately 100ml of contrast extravasated before investigation stopped. Upon arriving to patient room, patient complained of tightness and pain in the R forearm. On examination, the R arm was swollen, tense, non-erythematous, cool (although she had cold compress on) and mildly tender to palpation. Radial pulse was easily palpable and seen on dopplers by nursing staff.     Patient had infiltrated IV overnight which leaked 50 ml to 100 ml of contrast into patient's right forearm. Forearm is swollen, but pulses are present, sensation intact, and ROM normal. U/S upper extremity is negative.     Hospital medicine 4/28                        [facility] medicine 4/28       Please clarify if   Infiltration of IV contrast_into patient forearm_____________ is      [  ] Inherent/Integral to procedure   [  ] Present, but not a complication of the procedure   [  ] Complication of procedure   [  ] Other (please specify): __________________   [ x ] Clinically Undetermined

## 2019-05-01 PROBLEM — J45.21 MILD INTERMITTENT ASTHMA WITH ACUTE EXACERBATION: Status: RESOLVED | Noted: 2019-04-26 | Resolved: 2019-05-01

## 2019-05-01 PROBLEM — M79.89 FOREARM SWELLING: Status: RESOLVED | Noted: 2019-04-28 | Resolved: 2019-05-01

## 2019-05-01 LAB
ALBUMIN SERPL BCP-MCNC: 3.4 G/DL (ref 3.5–5.2)
ALP SERPL-CCNC: 78 U/L (ref 55–135)
ALT SERPL W/O P-5'-P-CCNC: 16 U/L (ref 10–44)
ANION GAP SERPL CALC-SCNC: 7 MMOL/L (ref 8–16)
AST SERPL-CCNC: 20 U/L (ref 10–40)
BACTERIA SPEC AEROBE CULT: NORMAL
BACTERIA SPEC AEROBE CULT: NORMAL
BASOPHILS # BLD AUTO: 0.05 K/UL (ref 0–0.2)
BASOPHILS NFR BLD: 0.6 % (ref 0–1.9)
BILIRUB SERPL-MCNC: 0.4 MG/DL (ref 0.1–1)
BUN SERPL-MCNC: 9 MG/DL (ref 6–20)
CALCIUM SERPL-MCNC: 9.2 MG/DL (ref 8.7–10.5)
CHLORIDE SERPL-SCNC: 101 MMOL/L (ref 95–110)
CO2 SERPL-SCNC: 31 MMOL/L (ref 23–29)
CREAT SERPL-MCNC: 0.8 MG/DL (ref 0.5–1.4)
DIFFERENTIAL METHOD: ABNORMAL
EOSINOPHIL # BLD AUTO: 0.2 K/UL (ref 0–0.5)
EOSINOPHIL NFR BLD: 2.9 % (ref 0–8)
ERYTHROCYTE [DISTWIDTH] IN BLOOD BY AUTOMATED COUNT: 17 % (ref 11.5–14.5)
EST. GFR  (AFRICAN AMERICAN): >60 ML/MIN/1.73 M^2
EST. GFR  (NON AFRICAN AMERICAN): >60 ML/MIN/1.73 M^2
GLUCOSE SERPL-MCNC: 85 MG/DL (ref 70–110)
GRAM STN SPEC: NORMAL
HCT VFR BLD AUTO: 33.6 % (ref 37–48.5)
HGB BLD-MCNC: 10.9 G/DL (ref 12–16)
IMM GRANULOCYTES # BLD AUTO: 0.06 K/UL (ref 0–0.04)
IMM GRANULOCYTES NFR BLD AUTO: 0.8 % (ref 0–0.5)
LYMPHOCYTES # BLD AUTO: 4.4 K/UL (ref 1–4.8)
LYMPHOCYTES NFR BLD: 55.6 % (ref 18–48)
MCH RBC QN AUTO: 22.5 PG (ref 27–31)
MCHC RBC AUTO-ENTMCNC: 32.4 G/DL (ref 32–36)
MCV RBC AUTO: 69 FL (ref 82–98)
MONOCYTES # BLD AUTO: 0.7 K/UL (ref 0.3–1)
MONOCYTES NFR BLD: 8.6 % (ref 4–15)
NEUTROPHILS # BLD AUTO: 2.5 K/UL (ref 1.8–7.7)
NEUTROPHILS NFR BLD: 31.5 % (ref 38–73)
NRBC BLD-RTO: 1 /100 WBC
PLATELET # BLD AUTO: 272 K/UL (ref 150–350)
PMV BLD AUTO: 10.4 FL (ref 9.2–12.9)
POTASSIUM SERPL-SCNC: 4 MMOL/L (ref 3.5–5.1)
PROT SERPL-MCNC: 7.5 G/DL (ref 6–8.4)
RBC # BLD AUTO: 4.85 M/UL (ref 4–5.4)
SODIUM SERPL-SCNC: 139 MMOL/L (ref 136–145)
WBC # BLD AUTO: 7.89 K/UL (ref 3.9–12.7)

## 2019-05-01 PROCEDURE — 85025 COMPLETE CBC W/AUTO DIFF WBC: CPT

## 2019-05-01 PROCEDURE — 94668 MNPJ CHEST WALL SBSQ: CPT

## 2019-05-01 PROCEDURE — 63600175 PHARM REV CODE 636 W HCPCS: Performed by: STUDENT IN AN ORGANIZED HEALTH CARE EDUCATION/TRAINING PROGRAM

## 2019-05-01 PROCEDURE — 27000646 HC AEROBIKA DEVICE

## 2019-05-01 PROCEDURE — 99232 PR SUBSEQUENT HOSPITAL CARE,LEVL II: ICD-10-PCS | Mod: ,,, | Performed by: INTERNAL MEDICINE

## 2019-05-01 PROCEDURE — 27000221 HC OXYGEN, UP TO 24 HOURS

## 2019-05-01 PROCEDURE — 99900035 HC TECH TIME PER 15 MIN (STAT)

## 2019-05-01 PROCEDURE — 25000003 PHARM REV CODE 250: Performed by: INTERNAL MEDICINE

## 2019-05-01 PROCEDURE — 80053 COMPREHEN METABOLIC PANEL: CPT

## 2019-05-01 PROCEDURE — 94799 UNLISTED PULMONARY SVC/PX: CPT

## 2019-05-01 PROCEDURE — 25000003 PHARM REV CODE 250: Performed by: STUDENT IN AN ORGANIZED HEALTH CARE EDUCATION/TRAINING PROGRAM

## 2019-05-01 PROCEDURE — 94640 AIRWAY INHALATION TREATMENT: CPT

## 2019-05-01 PROCEDURE — 25000242 PHARM REV CODE 250 ALT 637 W/ HCPCS: Performed by: STUDENT IN AN ORGANIZED HEALTH CARE EDUCATION/TRAINING PROGRAM

## 2019-05-01 PROCEDURE — 94761 N-INVAS EAR/PLS OXIMETRY MLT: CPT

## 2019-05-01 PROCEDURE — 36415 COLL VENOUS BLD VENIPUNCTURE: CPT

## 2019-05-01 PROCEDURE — 94664 DEMO&/EVAL PT USE INHALER: CPT

## 2019-05-01 PROCEDURE — 99232 SBSQ HOSP IP/OBS MODERATE 35: CPT | Mod: ,,, | Performed by: INTERNAL MEDICINE

## 2019-05-01 PROCEDURE — 11000001 HC ACUTE MED/SURG PRIVATE ROOM

## 2019-05-01 RX ORDER — FLUCONAZOLE 150 MG/1
150 TABLET ORAL ONCE
Status: COMPLETED | OUTPATIENT
Start: 2019-05-01 | End: 2019-05-01

## 2019-05-01 RX ORDER — OXYCODONE AND ACETAMINOPHEN 5; 325 MG/1; MG/1
1 TABLET ORAL EVERY 6 HOURS PRN
Status: DISCONTINUED | OUTPATIENT
Start: 2019-05-01 | End: 2019-05-04 | Stop reason: HOSPADM

## 2019-05-01 RX ORDER — CARVEDILOL 3.12 MG/1
6.25 TABLET ORAL 2 TIMES DAILY
Status: DISCONTINUED | OUTPATIENT
Start: 2019-05-01 | End: 2019-05-04 | Stop reason: HOSPADM

## 2019-05-01 RX ORDER — ERGOCALCIFEROL 1.25 MG/1
50000 CAPSULE ORAL
Status: DISCONTINUED | OUTPATIENT
Start: 2019-05-07 | End: 2019-05-04 | Stop reason: HOSPADM

## 2019-05-01 RX ORDER — ERGOCALCIFEROL 1.25 MG/1
50000 CAPSULE ORAL ONCE
Status: COMPLETED | OUTPATIENT
Start: 2019-05-01 | End: 2019-05-01

## 2019-05-01 RX ADMIN — IPRATROPIUM BROMIDE AND ALBUTEROL SULFATE 3 ML: .5; 3 SOLUTION RESPIRATORY (INHALATION) at 11:05

## 2019-05-01 RX ADMIN — HYDROMORPHONE HYDROCHLORIDE 1 MG: 1 INJECTION, SOLUTION INTRAMUSCULAR; INTRAVENOUS; SUBCUTANEOUS at 06:05

## 2019-05-01 RX ADMIN — IPRATROPIUM BROMIDE AND ALBUTEROL SULFATE 3 ML: .5; 3 SOLUTION RESPIRATORY (INHALATION) at 08:05

## 2019-05-01 RX ADMIN — DIPHENHYDRAMINE HYDROCHLORIDE 25 MG: 25 CAPSULE ORAL at 08:05

## 2019-05-01 RX ADMIN — ENOXAPARIN SODIUM 40 MG: 100 INJECTION SUBCUTANEOUS at 09:05

## 2019-05-01 RX ADMIN — ERGOCALCIFEROL 50000 UNITS: 1.25 CAPSULE ORAL at 08:05

## 2019-05-01 RX ADMIN — GUAIFENESIN AND DEXTROMETHORPHAN HYDROBROMIDE 1 TABLET: 600; 30 TABLET, EXTENDED RELEASE ORAL at 09:05

## 2019-05-01 RX ADMIN — CARBAMAZEPINE 800 MG: 200 TABLET ORAL at 08:05

## 2019-05-01 RX ADMIN — FLUCONAZOLE 150 MG: 150 TABLET ORAL at 02:05

## 2019-05-01 RX ADMIN — DIPHENHYDRAMINE HYDROCHLORIDE 25 MG: 25 CAPSULE ORAL at 04:05

## 2019-05-01 RX ADMIN — SODIUM CHLORIDE SOLN NEBU 3% 4 ML: 3 NEBU SOLN at 08:05

## 2019-05-01 RX ADMIN — STANDARDIZED SENNA CONCENTRATE AND DOCUSATE SODIUM 1 TABLET: 8.6; 5 TABLET, FILM COATED ORAL at 09:05

## 2019-05-01 RX ADMIN — LEVOFLOXACIN 750 MG: 750 TABLET, FILM COATED ORAL at 08:05

## 2019-05-01 RX ADMIN — GABAPENTIN 800 MG: 400 CAPSULE ORAL at 09:05

## 2019-05-01 RX ADMIN — STANDARDIZED SENNA CONCENTRATE AND DOCUSATE SODIUM 1 TABLET: 8.6; 5 TABLET, FILM COATED ORAL at 08:05

## 2019-05-01 RX ADMIN — AMLODIPINE BESYLATE 10 MG: 10 TABLET ORAL at 08:05

## 2019-05-01 RX ADMIN — FLUOXETINE 40 MG: 20 CAPSULE ORAL at 08:05

## 2019-05-01 RX ADMIN — IPRATROPIUM BROMIDE AND ALBUTEROL SULFATE 3 ML: .5; 3 SOLUTION RESPIRATORY (INHALATION) at 04:05

## 2019-05-01 RX ADMIN — HYDROMORPHONE HYDROCHLORIDE 1 MG: 1 INJECTION, SOLUTION INTRAMUSCULAR; INTRAVENOUS; SUBCUTANEOUS at 10:05

## 2019-05-01 RX ADMIN — CARBAMAZEPINE 800 MG: 200 TABLET ORAL at 09:05

## 2019-05-01 RX ADMIN — GUAIFENESIN AND DEXTROMETHORPHAN HYDROBROMIDE 1 TABLET: 600; 30 TABLET, EXTENDED RELEASE ORAL at 08:05

## 2019-05-01 RX ADMIN — HYDROMORPHONE HYDROCHLORIDE 1 MG: 1 INJECTION, SOLUTION INTRAMUSCULAR; INTRAVENOUS; SUBCUTANEOUS at 02:05

## 2019-05-01 RX ADMIN — HYDROMORPHONE HYDROCHLORIDE 1 MG: 1 INJECTION, SOLUTION INTRAMUSCULAR; INTRAVENOUS; SUBCUTANEOUS at 09:05

## 2019-05-01 RX ADMIN — CARVEDILOL 6.25 MG: 3.12 TABLET, FILM COATED ORAL at 09:05

## 2019-05-01 RX ADMIN — DIPHENHYDRAMINE HYDROCHLORIDE 25 MG: 25 CAPSULE ORAL at 09:05

## 2019-05-01 RX ADMIN — ALBUTEROL SULFATE 2.5 MG: 2.5 SOLUTION RESPIRATORY (INHALATION) at 02:05

## 2019-05-01 RX ADMIN — DIPHENHYDRAMINE HYDROCHLORIDE 25 MG: 25 CAPSULE ORAL at 02:05

## 2019-05-01 RX ADMIN — MICONAZOLE NITRATE: 20 OINTMENT TOPICAL at 02:05

## 2019-05-01 RX ADMIN — ENOXAPARIN SODIUM 40 MG: 100 INJECTION SUBCUTANEOUS at 08:05

## 2019-05-01 RX ADMIN — OXYCODONE HYDROCHLORIDE AND ACETAMINOPHEN 1 TABLET: 5; 325 TABLET ORAL at 04:05

## 2019-05-01 RX ADMIN — SODIUM CHLORIDE: 0.45 INJECTION, SOLUTION INTRAVENOUS at 06:05

## 2019-05-01 RX ADMIN — GABAPENTIN 800 MG: 400 CAPSULE ORAL at 08:05

## 2019-05-01 NOTE — MEDICAL/APP STUDENT
Pt desaturation walk test        Pt examined today     O2 sat 99% = When sitting comfortably on 2L O2 NC   O2 sat 96% = When sitting comfortably on RA  O2 sat 88% = While walking down cuellar on RA     Nicholas Justin MS3  5/1/2019

## 2019-05-01 NOTE — ASSESSMENT & PLAN NOTE
Patient on Norvasc 10mg and HCTZ 25mg at home Hypertensive and tachycardic on presentation, likely 2/2 pain. Patient did not take HCTZ prior to admit.    - Resumed norvasc 10mg  - Holding HCTZ at this time as patient currently in acute vaso-occlusive crisis, would D/C as intravascular depletion could precipitate sickle cell pain crisis in future  - Will start 6.25 mg dose of coreg PO

## 2019-05-01 NOTE — PLAN OF CARE
Problem: Adult Inpatient Plan of Care  Goal: Patient-Specific Goal (Individualization)  Outcome: Ongoing (interventions implemented as appropriate)  Pt free of falls and injury. Pt AAOx4. Pain managed with PRN Dilaudid x3. Respiratory treatment provided PRN by RT,afebrile, continuous fluids@125cc/hr,ambulated x1 to restroom,  bed low, breaks locked, SR up x2, call light within reach, will continue to monitor.   04/27/19 2120   Patient-Specific Goal (Individualization)   Patient-Specific Goals (Include Timeframe) Pt free of respiratory distress   OTHER   Individualized Care Needs Pt free of pain

## 2019-05-01 NOTE — SUBJECTIVE & OBJECTIVE
Interval History:  No acute events overnight. Will attempt to wean down oxygen. Will consider adding percocet for base pain control. Patient has also been hypertensive, will consider additional BP med    Review of Systems   Constitutional: Negative for fever.   Respiratory: Positive for cough and shortness of breath.    Cardiovascular: Positive for chest pain. Negative for palpitations.   Gastrointestinal: Negative for abdominal pain.   Musculoskeletal: Positive for arthralgias and back pain.     Objective:     Vital Signs (Most Recent):  Temp: 98 °F (36.7 °C) (05/01/19 0624)  Pulse: 87 (05/01/19 0624)  Resp: 18 (05/01/19 0624)  BP: (!) 161/109 (05/01/19 0624)  SpO2: 98 % (05/01/19 0624) Vital Signs (24h Range):  Temp:  [97.7 °F (36.5 °C)-98.5 °F (36.9 °C)] 98 °F (36.7 °C)  Pulse:  [] 87  Resp:  [16-25] 18  SpO2:  [92 %-98 %] 98 %  BP: (138-187)/() 161/109     Weight: 136.1 kg (300 lb)  Body mass index is 54.87 kg/m².  No intake or output data in the 24 hours ending 05/01/19 0635   Physical Exam   Constitutional: She is oriented to person, place, and time. She appears well-developed and well-nourished. No distress.   HENT:   Head: Normocephalic and atraumatic.   Eyes: No scleral icterus.   Neck: Normal range of motion. Neck supple.   Cardiovascular: Normal rate, regular rhythm, normal heart sounds and intact distal pulses. Exam reveals no friction rub.   No murmur heard.  Pulmonary/Chest: Effort normal. No respiratory distress. She has wheezes (left lung fields).   Abdominal: Soft. Bowel sounds are normal. She exhibits no distension. There is no tenderness. There is no guarding.   Musculoskeletal: Normal range of motion. She exhibits tenderness (bilateral knees and ankles). She exhibits no edema.   Neurological: She is oriented to person, place, and time. No sensory deficit.   Skin: Skin is warm and dry. No rash noted. No erythema. No pallor.   Nursing note and vitals reviewed.      Significant Labs: All  pertinent labs within the past 24 hours have been reviewed.    Significant Imaging: I have reviewed all pertinent imaging results/findings within the past 24 hours.

## 2019-05-01 NOTE — ASSESSMENT & PLAN NOTE
Patient with history of Sickle cell-beta thalassemia, presenting with acute pain crisis. Did not respond to outpatient PO oxy 10mg. Patient reporting fevers, chills, and cough night before presentation. Cough productive of white sputum, patient febrile and tachycardic on presentation. Flu negative. Works as a nurse, was caring for multiple patients with pneumonia. CXR negative for consolidation, however given history and chest pain was given dose of ceftriaxone in ED.  Patient also currently on menstrual period and she reports having significant, heavy flow. Likely exacerbating symptoms. Reticulocyte count 2.7. Patient not on hydroxyurea due to side effects.    Baseline Hg 11.8-12.0 per chart review, at baseline on presentation today. Given fevers, chest pain, and cough concerning for acute chest.   -Assessed by Heme/onc, not likely she is having acute sickle cell crisis given low retic count & stable hb, HBS quantitative in process    Plan:  - ABX: Azithromycin course complete. Changing Augmentin to Levaquin   - Pain control: prn dilaudid IV 1 mg q4. Stopped oxycodone as patient was not taking it. Will consider percocet  - IV fluids .45 saline at 125 ml/hr  - Supplemental O2, incentive spirometry and Acapella treatments ordered  - Albuterol nebs prn for wheezing  - DVT Prophylaxis: Lovenox

## 2019-05-01 NOTE — PROGRESS NOTES
[facility] Hospital Medicine  Progress Note    Patient Name: Nazanin Malone  MRN: [id number]  Patient Class: IP- Inpatient   Admission Date: 4/25/2019  Length of Stay: 6 days  Attending Physician: Cortney Arellano MD  Primary Care Provider: Balta Forbes MD    [facility] Medicine [place] [facility] [place] 5 Raymond Aparicio MD    Subjective:     Principal Problem:Sickle cell disease, type S beta-zero thalassemia, with acute chest syndrome    HPI:  Ms. Malone is a 37 year old lady with a past medical history significant for sickle cell beta-thalassemia, asthma, HTN, and trigeminal neuralgia who [place] [facility] [place] with complaints of cough, shortness of breath, and body pain. The patient states symptoms began this morning with a headache. She took home dose of percocet 10mg that did not help her symptoms. She then began to notice bilateral leg and toe, bilateral hip, left arm, and chest pain with breathing which prompted her to come in to the ED for further evaluation. The patient also endorsed associated shortness of breath. She endorses a cough productive of thick white sputum that began overnight as well as fevers (103.1) and chills at home. 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 She endorses good PO intake. She has a history of asthma and tried her albuterol rescue inhaler that did not improve her shortness of breath. The patient is currently on her menstrual cycle and she endorses heavy flow with associated clots which is normal for her. She states that her sickle cell often flares when she is on her cycle. She reports one previous episode of acute chest syndrome in 2017.    [place] [facility] [place] Sickle beta-thalassemia, last seen in clinic on 1/9/19. Her baseline Hg is 11.8-12.0 and she states she has only ever needed a transfusion when she was 13 years old or so. She had previously required iron infusions in the  past, most recently in July 2018 but has not needed them since. She was previously on hydroxyurea but stopped the medication on her own due to complaints of rash. She was offered L-glutamine but it was not covered by insurance.    In the ED the patient was febrile and required IV narcotics for pain relief. CXR did not show any focal consolidation. She O2 sat was 99% on room air. Given fever and chest pain she was given 1x dose of rocefin and IVFs She was admitted to hospital medicine for sickle cell pain crisis.      Hospital Course:  Patient admitted to hospital medicine for sickle cell pain crisis and shortness of breath. Initially, patient was treated with prn dilaudid and oral oxycodone. She was also started on continuous IVF in the form of lactate ringers and supplemental O2 as needed. Patient still complains of pain, although she primarily uses dilaudid and not much of her oral medication. There was some concern for acute chest, however heme/onc was consulted, reports that this is unlikely to be acute chest syndrome. On the night on 4/27, patient had an infiltrated IV when getting contrast for CT scan. Right arm is swollen, but pulses are present, full ROM, and sensation is intact. This was treated with elevation and alternating hot and cold packs. Swelling on the right forearm reduced considerably. In addition, sputum cultures were collected showing moderate gram negative rods, few gram positive cocci and rare gram positive rods. Changed antibiotics to Levaquin.         Interval History:  No acute events overnight. Will attempt to wean down oxygen. Will consider adding percocet for base pain control. Patient has also been hypertensive, will consider additional BP med    Review of Systems   Constitutional: Negative for fever.   Respiratory: Positive for cough and shortness of breath.    Cardiovascular: Positive for chest pain. Negative for palpitations.   Gastrointestinal: Negative for abdominal pain.    Musculoskeletal: Positive for arthralgias and back pain.     Objective:     Vital Signs (Most Recent):  Temp: 98 °F (36.7 °C) (05/01/19 0624)  Pulse: 87 (05/01/19 0624)  Resp: 18 (05/01/19 0624)  BP: (!) 161/109 (05/01/19 0624)  SpO2: 98 % (05/01/19 0624) Vital Signs (24h Range):  Temp:  [97.7 °F (36.5 °C)-98.5 °F (36.9 °C)] 98 °F (36.7 °C)  Pulse:  [] 87  Resp:  [16-25] 18  SpO2:  [92 %-98 %] 98 %  BP: (138-187)/() 161/109     Weight: 136.1 kg (300 lb)  Body mass index is 54.87 kg/m².  No intake or output data in the 24 hours ending 05/01/19 0635   Physical Exam   Constitutional: She is oriented to person, place, and time. She appears well-developed and well-nourished. No distress.   HENT:   Head: Normocephalic and atraumatic.   Eyes: No scleral icterus.   Neck: Normal range of motion. Neck supple.   Cardiovascular: Normal rate, regular rhythm, normal heart sounds and intact distal pulses. Exam reveals no friction rub.   No murmur heard.  Pulmonary/Chest: Effort normal. No respiratory distress. She has wheezes (left lung fields, improved).   Abdominal: Soft. Bowel sounds are normal. She exhibits no distension. There is no tenderness. There is no guarding.   Musculoskeletal: Normal range of motion. She exhibits tenderness (bilateral knees and ankles). She exhibits no edema.   Neurological: She is oriented to person, place, and time. No sensory deficit.   Skin: Skin is warm and dry. No rash noted. No erythema. No pallor.   Nursing note and vitals reviewed.      Significant Labs: All pertinent labs within the past 24 hours have been reviewed.    Significant Imaging: I have reviewed all pertinent imaging results/findings within the past 24 hours.    Assessment/Plan:      * Sickle cell disease, type S beta-zero thalassemia, with acute chest syndrome  Patient with history of Sickle cell-beta thalassemia, presenting with acute pain crisis. Did not respond to outpatient PO oxy 10mg. Patient reporting fevers,  "chills, and cough night before presentation. Cough productive of white sputum, patient febrile and tachycardic on presentation. Flu negative. Works as a nurse, was caring for multiple patients with pneumonia. CXR negative for consolidation, however given history and chest pain was given dose of ceftriaxone in ED.  Patient also currently on menstrual period and she reports having significant, heavy flow. Likely exacerbating symptoms. Reticulocyte count 2.7. Patient not on hydroxyurea due to side effects.    Baseline Hg 11.8-12.0 per chart review, at baseline on presentation today. Given fevers, chest pain, and cough concerning for acute chest.   -Assessed by Heme/onc, not likely she is having acute sickle cell crisis given low retic count & stable hb, HBS quantitative in process    Plan:  - ABX: Azithromycin course complete. Changing Augmentin to Levaquin   - Pain control: prn dilaudid IV 1 mg q4. Stopped oxycodone as patient was not taking it. Will consider percocet  - IV fluids .45 saline at 125 ml/hr  - Supplemental O2, incentive spirometry and Acapella treatments ordered  - Albuterol nebs prn for wheezing  - DVT Prophylaxis: Lovenox       Anxiety  Patient states she has significant anxiety, describes subclinical "stroke" in past however after discussing with patient she believes it was 2/2 anxiety as her daughter had brought a stranger into their home while the patient was hospitalized. She was experiencing left arm and leg weakness, eye twitching, and difficulty speaking. Symptoms at that time resolved acutely with prn hydralazine. CT head was negative.    Previously on klonopin and remeron however she states she no longer takes them as symptoms well controlled on fluoxetine 40mg    - Continued home fluoxetine      Morbid obesity due to excess calories  Cardiac diet.  a1c of 5.4      Benign essential HTN  Patient on Norvasc 10mg and HCTZ 25mg at home Hypertensive and tachycardic on presentation, likely 2/2 pain. " Patient did not take HCTZ prior to admit.    - Resumed norvasc 10mg  - Holding HCTZ at this time as patient currently in acute vaso-occlusive crisis, would D/C as intravascular depletion could precipitate sickle cell pain crisis in future  - Will start 6.25 mg dose of coreg PO      Iron deficiency anemia  Patient with prior history of iron deficiency, presumed due to heavy menses. Last Ferritin in 1/2019 was 29. Followed by hematology, required infusion in the past, most recently in 7/2018.    Hg on admit 11.8 at baseline per chart review. MCV 69, however patient with Beta thalassemia. Ferritin would likely be elevated 2/2 acute crisis so unlikely helpful in assessing iron status at this time    - CBC daily  - Consider repeat Iron studies once acute chest resolves        VTE Risk Mitigation (From admission, onward)        Ordered     enoxaparin injection 40 mg  Every 12 hours      04/26/19 0014     IP VTE HIGH RISK PATIENT  Once      04/26/19 0014              Raymond Aparicio MD  Department of Hospital Medicine   [facility]

## 2019-05-02 PROBLEM — D57.439: Status: ACTIVE | Noted: 2019-04-26

## 2019-05-02 LAB
ALBUMIN SERPL BCP-MCNC: 3.4 G/DL (ref 3.5–5.2)
ALP SERPL-CCNC: 76 U/L (ref 55–135)
ALT SERPL W/O P-5'-P-CCNC: 14 U/L (ref 10–44)
ANION GAP SERPL CALC-SCNC: 7 MMOL/L (ref 8–16)
AST SERPL-CCNC: 19 U/L (ref 10–40)
BILIRUB SERPL-MCNC: 0.4 MG/DL (ref 0.1–1)
BUN SERPL-MCNC: 11 MG/DL (ref 6–20)
CALCIUM SERPL-MCNC: 9.1 MG/DL (ref 8.7–10.5)
CHLORIDE SERPL-SCNC: 101 MMOL/L (ref 95–110)
CO2 SERPL-SCNC: 28 MMOL/L (ref 23–29)
CREAT SERPL-MCNC: 0.8 MG/DL (ref 0.5–1.4)
EST. GFR  (AFRICAN AMERICAN): >60 ML/MIN/1.73 M^2
EST. GFR  (NON AFRICAN AMERICAN): >60 ML/MIN/1.73 M^2
GLUCOSE SERPL-MCNC: 101 MG/DL (ref 70–110)
POTASSIUM SERPL-SCNC: 3.9 MMOL/L (ref 3.5–5.1)
PROT SERPL-MCNC: 7.4 G/DL (ref 6–8.4)
SODIUM SERPL-SCNC: 136 MMOL/L (ref 136–145)

## 2019-05-02 PROCEDURE — 11000001 HC ACUTE MED/SURG PRIVATE ROOM

## 2019-05-02 PROCEDURE — 80053 COMPREHEN METABOLIC PANEL: CPT

## 2019-05-02 PROCEDURE — 94664 DEMO&/EVAL PT USE INHALER: CPT

## 2019-05-02 PROCEDURE — 99900035 HC TECH TIME PER 15 MIN (STAT)

## 2019-05-02 PROCEDURE — 97116 GAIT TRAINING THERAPY: CPT

## 2019-05-02 PROCEDURE — 25000003 PHARM REV CODE 250: Performed by: STUDENT IN AN ORGANIZED HEALTH CARE EDUCATION/TRAINING PROGRAM

## 2019-05-02 PROCEDURE — 94668 MNPJ CHEST WALL SBSQ: CPT

## 2019-05-02 PROCEDURE — 99232 SBSQ HOSP IP/OBS MODERATE 35: CPT | Mod: ,,, | Performed by: INTERNAL MEDICINE

## 2019-05-02 PROCEDURE — 27000221 HC OXYGEN, UP TO 24 HOURS

## 2019-05-02 PROCEDURE — 25000003 PHARM REV CODE 250: Performed by: INTERNAL MEDICINE

## 2019-05-02 PROCEDURE — 36415 COLL VENOUS BLD VENIPUNCTURE: CPT

## 2019-05-02 PROCEDURE — 25000242 PHARM REV CODE 250 ALT 637 W/ HCPCS: Performed by: STUDENT IN AN ORGANIZED HEALTH CARE EDUCATION/TRAINING PROGRAM

## 2019-05-02 PROCEDURE — 94640 AIRWAY INHALATION TREATMENT: CPT

## 2019-05-02 PROCEDURE — 63600175 PHARM REV CODE 636 W HCPCS: Performed by: STUDENT IN AN ORGANIZED HEALTH CARE EDUCATION/TRAINING PROGRAM

## 2019-05-02 PROCEDURE — 99232 PR SUBSEQUENT HOSPITAL CARE,LEVL II: ICD-10-PCS | Mod: ,,, | Performed by: INTERNAL MEDICINE

## 2019-05-02 PROCEDURE — 94761 N-INVAS EAR/PLS OXIMETRY MLT: CPT

## 2019-05-02 RX ORDER — FLUTICASONE FUROATE AND VILANTEROL 100; 25 UG/1; UG/1
1 POWDER RESPIRATORY (INHALATION) DAILY
Status: DISCONTINUED | OUTPATIENT
Start: 2019-05-02 | End: 2019-05-04 | Stop reason: HOSPADM

## 2019-05-02 RX ORDER — LEVOFLOXACIN 750 MG/1
750 TABLET ORAL DAILY
Status: COMPLETED | OUTPATIENT
Start: 2019-05-03 | End: 2019-05-04

## 2019-05-02 RX ADMIN — IPRATROPIUM BROMIDE AND ALBUTEROL SULFATE 3 ML: .5; 3 SOLUTION RESPIRATORY (INHALATION) at 04:05

## 2019-05-02 RX ADMIN — STANDARDIZED SENNA CONCENTRATE AND DOCUSATE SODIUM 1 TABLET: 8.6; 5 TABLET, FILM COATED ORAL at 09:05

## 2019-05-02 RX ADMIN — DIPHENHYDRAMINE HYDROCHLORIDE 25 MG: 25 CAPSULE ORAL at 11:05

## 2019-05-02 RX ADMIN — CARVEDILOL 6.25 MG: 3.12 TABLET, FILM COATED ORAL at 09:05

## 2019-05-02 RX ADMIN — FLUOXETINE 40 MG: 20 CAPSULE ORAL at 09:05

## 2019-05-02 RX ADMIN — HYDROMORPHONE HYDROCHLORIDE 1 MG: 1 INJECTION, SOLUTION INTRAMUSCULAR; INTRAVENOUS; SUBCUTANEOUS at 02:05

## 2019-05-02 RX ADMIN — GUAIFENESIN AND DEXTROMETHORPHAN HYDROBROMIDE 1 TABLET: 600; 30 TABLET, EXTENDED RELEASE ORAL at 09:05

## 2019-05-02 RX ADMIN — OXYCODONE HYDROCHLORIDE AND ACETAMINOPHEN 1 TABLET: 5; 325 TABLET ORAL at 03:05

## 2019-05-02 RX ADMIN — GABAPENTIN 800 MG: 400 CAPSULE ORAL at 09:05

## 2019-05-02 RX ADMIN — MICONAZOLE NITRATE: 20 OINTMENT TOPICAL at 08:05

## 2019-05-02 RX ADMIN — GUAIFENESIN AND DEXTROMETHORPHAN HYDROBROMIDE 1 TABLET: 600; 30 TABLET, EXTENDED RELEASE ORAL at 08:05

## 2019-05-02 RX ADMIN — OXYCODONE HYDROCHLORIDE AND ACETAMINOPHEN 1 TABLET: 5; 325 TABLET ORAL at 06:05

## 2019-05-02 RX ADMIN — AMLODIPINE BESYLATE 10 MG: 10 TABLET ORAL at 09:05

## 2019-05-02 RX ADMIN — IPRATROPIUM BROMIDE AND ALBUTEROL SULFATE 3 ML: .5; 3 SOLUTION RESPIRATORY (INHALATION) at 08:05

## 2019-05-02 RX ADMIN — HYDROMORPHONE HYDROCHLORIDE 1 MG: 1 INJECTION, SOLUTION INTRAMUSCULAR; INTRAVENOUS; SUBCUTANEOUS at 08:05

## 2019-05-02 RX ADMIN — ENOXAPARIN SODIUM 40 MG: 100 INJECTION SUBCUTANEOUS at 09:05

## 2019-05-02 RX ADMIN — DIPHENHYDRAMINE HYDROCHLORIDE 25 MG: 25 CAPSULE ORAL at 03:05

## 2019-05-02 RX ADMIN — LEVOFLOXACIN 750 MG: 750 TABLET, FILM COATED ORAL at 09:05

## 2019-05-02 RX ADMIN — STANDARDIZED SENNA CONCENTRATE AND DOCUSATE SODIUM 1 TABLET: 8.6; 5 TABLET, FILM COATED ORAL at 08:05

## 2019-05-02 RX ADMIN — IPRATROPIUM BROMIDE AND ALBUTEROL SULFATE 3 ML: .5; 3 SOLUTION RESPIRATORY (INHALATION) at 01:05

## 2019-05-02 RX ADMIN — CARBAMAZEPINE 800 MG: 200 TABLET ORAL at 08:05

## 2019-05-02 RX ADMIN — SODIUM CHLORIDE: 0.45 INJECTION, SOLUTION INTRAVENOUS at 11:05

## 2019-05-02 RX ADMIN — CARVEDILOL 6.25 MG: 3.12 TABLET, FILM COATED ORAL at 08:05

## 2019-05-02 RX ADMIN — SODIUM CHLORIDE: 0.45 INJECTION, SOLUTION INTRAVENOUS at 08:05

## 2019-05-02 RX ADMIN — OXYCODONE HYDROCHLORIDE AND ACETAMINOPHEN 1 TABLET: 5; 325 TABLET ORAL at 10:05

## 2019-05-02 RX ADMIN — CARBAMAZEPINE 800 MG: 200 TABLET ORAL at 09:05

## 2019-05-02 RX ADMIN — GABAPENTIN 800 MG: 400 CAPSULE ORAL at 08:05

## 2019-05-02 RX ADMIN — SODIUM CHLORIDE SOLN NEBU 3% 4 ML: 3 NEBU SOLN at 08:05

## 2019-05-02 RX ADMIN — HYDROMORPHONE HYDROCHLORIDE 1 MG: 1 INJECTION, SOLUTION INTRAMUSCULAR; INTRAVENOUS; SUBCUTANEOUS at 09:05

## 2019-05-02 RX ADMIN — ENOXAPARIN SODIUM 40 MG: 100 INJECTION SUBCUTANEOUS at 08:05

## 2019-05-02 RX ADMIN — HYDROMORPHONE HYDROCHLORIDE 1 MG: 1 INJECTION, SOLUTION INTRAMUSCULAR; INTRAVENOUS; SUBCUTANEOUS at 03:05

## 2019-05-02 NOTE — PROGRESS NOTES
[facility] Hospital Medicine  Progress Note    Patient Name: Nazanin Malone  MRN: [id number]  Patient Class: IP- Inpatient   Admission Date: 4/25/2019  Length of Stay: 7 days  Attending Physician: Cortney Arellano MD  Primary Care Provider: Balta Forbes MD    [facility] Medicine Team: [facility] [place] [place] 5 Pee Lima MD    Subjective:     Principal Problem:Sickle cell disease, type S beta-zero thalassemia with crisis    HPI:  Ms. Malone is a 37 year old lady with a past medical history significant for sickle cell beta-thalassemia, asthma, HTN, and trigeminal neuralgia who presents to [facility] [place] with complaints of cough, shortness of breath, and body pain. The patient states symptoms began this morning with a headache. She took home dose of percocet 10mg that did not help her symptoms. She then began to notice bilateral leg and toe, bilateral hip, left arm, and chest pain with breathing which prompted her to come in to the ED for further evaluation. The patient also endorsed associated shortness of breath. She endorses a cough productive of thick white sputum that began overnight as well as fevers (103.1) and chills at home. 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 She endorses good PO intake. She has a history of asthma and tried her albuterol rescue inhaler that did not improve her shortness of breath. The patient is currently on her menstrual cycle and she endorses heavy flow with associated clots which is normal for her. She states that her sickle cell often flares when she is on her cycle. She reports one previous episode of acute chest syndrome in 2017.    The patient follows with Hematology at [facility] [place] for her Sickle beta-thalassemia, last seen in clinic on 1/9/19. Her baseline Hg is 11.8-12.0 and she states she has only ever needed a transfusion when she was 13 years old or so. She had previously required iron infusions in the past, most recently  in July 2018 but has not needed them since. She was previously on hydroxyurea but stopped the medication on her own due to complaints of rash. She was offered L-glutamine but it was not covered by insurance.    In the ED the patient was febrile and required IV narcotics for pain relief. CXR did not show any focal consolidation. She O2 sat was 99% on room air. Given fever and chest pain she was given 1x dose of rocefin and IVFs She was admitted to hospital medicine for sickle cell pain crisis.      Hospital Course:  Patient admitted to hospital medicine for sickle cell pain crisis and shortness of breath. Initially, patient was treated with prn dilaudid and oral oxycodone. She was also started on continuous IVF in the form of lactate ringers and supplemental O2 as needed. Patient still complains of pain, although she primarily uses dilaudid and not much of her oral medication. There was some concern for acute chest, however heme/onc was consulted, reports that this is unlikely to be acute chest syndrome. On the night on 4/27, patient had an infiltrated IV when getting contrast for CT scan. Right arm is swollen, but pulses are present, full ROM, and sensation is intact. This was treated with elevation and alternating hot and cold packs. Swelling on the right forearm reduced considerably. In addition, sputum cultures were collected showing moderate gram negative rods, few gram positive cocci and rare gram positive rods. Changed antibiotics to Levaquin.         Interval History:  No acute events overnight. Continue to wean oxygen as tolerated.  Subjectively improved. Tolerating PO.     Review of Systems   Constitutional: Negative for fever.   Respiratory: Positive for cough and shortness of breath.    Cardiovascular: Positive for chest pain. Negative for palpitations.   Gastrointestinal: Negative for abdominal pain.   Musculoskeletal: Positive for arthralgias and back pain.     Objective:     Vital Signs (Most  Recent):  Temp: 98.7 °F (37.1 °C) (05/02/19 1342)  Pulse: 80 (05/02/19 1642)  Resp: 18 (05/02/19 1642)  BP: (!) 131/90 (05/02/19 1342)  SpO2: 96 % (05/02/19 1642) Vital Signs (24h Range):  Temp:  [97.2 °F (36.2 °C)-98.7 °F (37.1 °C)] 98.7 °F (37.1 °C)  Pulse:  [70-94] 80  Resp:  [18-22] 18  SpO2:  [93 %-98 %] 96 %  BP: (131-158)/(74-96) 131/90     Weight: 136.1 kg (300 lb)  Body mass index is 54.87 kg/m².    Intake/Output Summary (Last 24 hours) at 5/2/2019 1925  Last data filed at 5/2/2019 0500  Gross per 24 hour   Intake 2700 ml   Output --   Net 2700 ml      Physical Exam   Constitutional: She is oriented to person, place, and time. She appears well-developed and well-nourished. No distress.   HENT:   Head: Normocephalic and atraumatic.   Eyes: No scleral icterus.   Neck: Normal range of motion. Neck supple.   Cardiovascular: Normal rate, regular rhythm, normal heart sounds and intact distal pulses. Exam reveals no friction rub.   No murmur heard.  Pulmonary/Chest: Effort normal. No respiratory distress. She has wheezes (left lung fields).   Abdominal: Soft. Bowel sounds are normal. She exhibits no distension. There is no tenderness. There is no guarding.   Musculoskeletal: Normal range of motion. She exhibits tenderness (bilateral knees and ankles). She exhibits no edema.   Neurological: She is oriented to person, place, and time. No sensory deficit.   Skin: Skin is warm and dry. No rash noted. No erythema. No pallor.   Nursing note and vitals reviewed.      Significant Labs: All pertinent labs within the past 24 hours have been reviewed.    Significant Imaging: I have reviewed all pertinent imaging results/findings within the past 24 hours.    Assessment/Plan:      * Sickle cell disease, type S beta-zero thalassemia with crisis  Heme onc consulted and feels acute chest unlikely,  IV fluids with PRN pain control   - IV dilaudid for pain control and will encourage PO Oxycodone   - Patient previous on fentanyl  "patch with difficult to control pain in the past       Anxiety  Patient states she has significant anxiety, describes subclinical "stroke" in past however after discussing with patient she believes it was 2/2 anxiety as her daughter had brought a stranger into their home while the patient was hospitalized. She was experiencing left arm and leg weakness, eye twitching, and difficulty speaking. Symptoms at that time resolved acutely with prn hydralazine. CT head was negative.    Previously on klonopin and remeron however she states she no longer takes them as symptoms well controlled on fluoxetine 40mg    - Continued home fluoxetine      Right lower lobe pneumonia  Treating for presumed PNA seen on CT  - Continue Levaquin for 10 day course total       Morbid obesity due to excess calories  Cardiac diet.  a1c of 5.4      Acute respiratory failure with hypoxia  Suspect PNA complicated by asthma, continues to require oxygen but now improved, previously patient has required significant time to recover which she attributes to her sickle cell   - Continue Levaquin for total of 10 day abx course  - Continue duonebs q4prn with acapella   - Consider CTA if no improvement      Benign essential HTN  Patient on Norvasc 10mg and HCTZ 25mg at home Hypertensive and tachycardic on presentation, likely 2/2 pain. Patient did not take HCTZ prior to admit.    - Resumed norvasc 10mg  - Holding HCTZ at this time as patient currently in acute vaso-occlusive crisis, would D/C as intravascular depletion could precipitate sickle cell pain crisis in future  - Will continue 6.25 mg dose of coreg PO      Iron deficiency anemia  Patient with prior history of iron deficiency, presumed due to heavy menses. Last Ferritin in 1/2019 was 29. Followed by hematology, required infusion in the past, most recently in 7/2018.    Hg on admit 11.8 at baseline per chart review. MCV 69, however patient with Beta thalassemia. Ferritin would likely be elevated 2/2 " acute crisis so unlikely helpful in assessing iron status at this time    - CBC daily  - Consider repeat Iron studies once acute chest resolves        VTE Risk Mitigation (From admission, onward)        Ordered     enoxaparin injection 40 mg  Every 12 hours      04/26/19 0014     IP VTE HIGH RISK PATIENT  Once      04/26/19 0014          Dispo: Home once oxygenation improves or consider home with PRN oxygen, subjective improvement everyday     Pee Lima MD  Department of Hospital Medicine   [facility]

## 2019-05-02 NOTE — PLAN OF CARE
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      "

## 2019-05-02 NOTE — PLAN OF CARE
Problem: Physical Therapy Goal  Goal: Physical Therapy Goal  Goals to be met by: 5/10/2019     Patient will increase functional independence with mobility by performin. Gait  x 150 feet with Modified [place] using LRAD  2. Ascend/descend 15 stair with bilateral Handrails Modified [place]   Outcome: Outcome(s) achieved Date Met: 19  Met all goals; does not require additional acute PT services at this time; D/C note to follow    Joce Laura PT,DPT  2019

## 2019-05-02 NOTE — PT/OT/SLP PROGRESS
"Physical Therapy Treatment  And Discharge    Patient Name:  Nazanin Malone   MRN:  [id number]    Recommendations:     Discharge Recommendations:  home   Discharge Equipment Recommendations: none   Barriers to discharge: None    Assessment:     Nazanin Malone is a 39 y.o. female admitted with a medical diagnosis of Sickle cell disease, type S beta-zero thalassemia, with acute chest syndrome.  She presents with the following impairments/functional limitations:  impaired endurance, impaired cardiopulmonary response to activity.  Pt demo independence c functional mobility but continues to be limited by impaired cardiopulmonary response.  Pt ambulating community distances and able to navigate 1+ flight of stairs c no deficits.  Pt performed all tasks on RA on this date and demo O2 sats of 91%+.  Pt met all goals outlined and does not require additional acute PT services at this time.  Recommending pt return Home c no additional need once medically appropriate for d/c.    Rehab Prognosis: Good; patient would benefit from acute skilled PT services to address these deficits and reach maximum level of function.    Recent Surgery: * No surgery found *      Plan:     During this hospitalization, patient to be seen 2 x/week to address the identified rehab impairments via gait training, therapeutic activities, therapeutic exercises, neuromuscular re-education and progress toward the following goals:    · Plan of Care Expires:  05/25/19    Subjective     Chief Complaint: fatigue  Patient/Family Comments/goals: "I want to move to [place]."  Pain/Comfort:  · Pain Rating 1: 0/10      Objective:     Communicated with RN prior to session.  Patient found HOB elevated with peripheral IV, telemetry, pulse ox (continuous), oxygen upon PT entry to room.     General Precautions: Standard, fall   Orthopedic Precautions:N/A   Braces: N/A     Functional Mobility:  · Bed Mobility:     · Rolling Left:  independence  · Scooting: " independence  · Supine to Sit: independence  · Sit to Supine: independence  · Transfers:     · Sit to Stand:  independence with no AD  · Gait: 400ft c no AD (I)  · Balance: standing (I)  · Stairs:  Pt ascended/descended 25 stair(s) with No Assistive Device with right handrail with Independent.       AM-PAC 6 CLICK MOBILITY  Turning over in bed (including adjusting bedclothes, sheets and blankets)?: 4  Sitting down on and standing up from a chair with arms (e.g., wheelchair, bedside commode, etc.): 4  Moving from lying on back to sitting on the side of the bed?: 4  Moving to and from a bed to a chair (including a wheelchair)?: 4  Need to walk in hospital room?: 4  Climbing 3-5 steps with a railing?: 4  Basic Mobility Total Score: 24       Therapeutic Activities and Exercises:   Pt educated on: PT role/POC; safety c mobility; benefits of OOB activities; performing therex; d/c recs - v/u  -Educated on findings and plans for d/c - pt in agreement    Patient left HOB elevated with all lines intact, call button in reach and RN notified..    GOALS:   Multidisciplinary Problems     Physical Therapy Goals     Not on file          Multidisciplinary Problems (Resolved)        Problem: Physical Therapy Goal    Goal Priority Disciplines Outcome Goal Variances Interventions   Physical Therapy Goal   (Resolved)     PT, PT/OT Outcome(s) achieved     Description:  Goals to be met by: 5/10/2019     Patient will increase functional independence with mobility by performin. Gait  x 150 feet with Modified Rice using LRAD  2. Ascend/descend 15 stair with bilateral Handrails Modified Rice                    Time Tracking:     PT Received On: 19  PT Start Time: 1400     PT Stop Time: 1425  PT Total Time (min): 25 min     Billable Minutes: Gait Training 25 min    Treatment Type: Treatment  PT/PTA: PT           Joce Laura, PT  2019

## 2019-05-03 LAB
ALBUMIN SERPL BCP-MCNC: 3.2 G/DL (ref 3.5–5.2)
ALP SERPL-CCNC: 75 U/L (ref 55–135)
ALT SERPL W/O P-5'-P-CCNC: 12 U/L (ref 10–44)
ANION GAP SERPL CALC-SCNC: 7 MMOL/L (ref 8–16)
AST SERPL-CCNC: 18 U/L (ref 10–40)
BILIRUB SERPL-MCNC: 0.3 MG/DL (ref 0.1–1)
BUN SERPL-MCNC: 13 MG/DL (ref 6–20)
CALCIUM SERPL-MCNC: 9.1 MG/DL (ref 8.7–10.5)
CHLORIDE SERPL-SCNC: 101 MMOL/L (ref 95–110)
CO2 SERPL-SCNC: 29 MMOL/L (ref 23–29)
CREAT SERPL-MCNC: 0.8 MG/DL (ref 0.5–1.4)
EST. GFR  (AFRICAN AMERICAN): >60 ML/MIN/1.73 M^2
EST. GFR  (NON AFRICAN AMERICAN): >60 ML/MIN/1.73 M^2
GLUCOSE SERPL-MCNC: 92 MG/DL (ref 70–110)
POTASSIUM SERPL-SCNC: 4 MMOL/L (ref 3.5–5.1)
PROT SERPL-MCNC: 7.1 G/DL (ref 6–8.4)
SODIUM SERPL-SCNC: 137 MMOL/L (ref 136–145)

## 2019-05-03 PROCEDURE — 25000003 PHARM REV CODE 250: Performed by: STUDENT IN AN ORGANIZED HEALTH CARE EDUCATION/TRAINING PROGRAM

## 2019-05-03 PROCEDURE — 94664 DEMO&/EVAL PT USE INHALER: CPT

## 2019-05-03 PROCEDURE — 99900035 HC TECH TIME PER 15 MIN (STAT)

## 2019-05-03 PROCEDURE — 11000001 HC ACUTE MED/SURG PRIVATE ROOM

## 2019-05-03 PROCEDURE — 25000003 PHARM REV CODE 250: Performed by: INTERNAL MEDICINE

## 2019-05-03 PROCEDURE — 63600175 PHARM REV CODE 636 W HCPCS: Performed by: STUDENT IN AN ORGANIZED HEALTH CARE EDUCATION/TRAINING PROGRAM

## 2019-05-03 PROCEDURE — 27000221 HC OXYGEN, UP TO 24 HOURS

## 2019-05-03 PROCEDURE — 27000190 HC CPAP FULL FACE MASK W/VALVE

## 2019-05-03 PROCEDURE — 25000242 PHARM REV CODE 250 ALT 637 W/ HCPCS: Performed by: STUDENT IN AN ORGANIZED HEALTH CARE EDUCATION/TRAINING PROGRAM

## 2019-05-03 PROCEDURE — 94640 AIRWAY INHALATION TREATMENT: CPT

## 2019-05-03 PROCEDURE — 94761 N-INVAS EAR/PLS OXIMETRY MLT: CPT

## 2019-05-03 PROCEDURE — 94660 CPAP INITIATION&MGMT: CPT

## 2019-05-03 PROCEDURE — 27000646 HC AEROBIKA DEVICE

## 2019-05-03 PROCEDURE — 99232 PR SUBSEQUENT HOSPITAL CARE,LEVL II: ICD-10-PCS | Mod: ,,, | Performed by: INTERNAL MEDICINE

## 2019-05-03 PROCEDURE — 99232 SBSQ HOSP IP/OBS MODERATE 35: CPT | Mod: ,,, | Performed by: INTERNAL MEDICINE

## 2019-05-03 PROCEDURE — 80053 COMPREHEN METABOLIC PANEL: CPT

## 2019-05-03 PROCEDURE — 94668 MNPJ CHEST WALL SBSQ: CPT

## 2019-05-03 PROCEDURE — 36415 COLL VENOUS BLD VENIPUNCTURE: CPT

## 2019-05-03 RX ADMIN — IPRATROPIUM BROMIDE AND ALBUTEROL SULFATE 3 ML: .5; 3 SOLUTION RESPIRATORY (INHALATION) at 08:05

## 2019-05-03 RX ADMIN — GABAPENTIN 800 MG: 400 CAPSULE ORAL at 08:05

## 2019-05-03 RX ADMIN — HYDROMORPHONE HYDROCHLORIDE 1 MG: 1 INJECTION, SOLUTION INTRAMUSCULAR; INTRAVENOUS; SUBCUTANEOUS at 09:05

## 2019-05-03 RX ADMIN — SODIUM CHLORIDE: 0.45 INJECTION, SOLUTION INTRAVENOUS at 10:05

## 2019-05-03 RX ADMIN — HYDROMORPHONE HYDROCHLORIDE 1 MG: 1 INJECTION, SOLUTION INTRAMUSCULAR; INTRAVENOUS; SUBCUTANEOUS at 08:05

## 2019-05-03 RX ADMIN — GUAIFENESIN AND DEXTROMETHORPHAN HYDROBROMIDE 1 TABLET: 600; 30 TABLET, EXTENDED RELEASE ORAL at 09:05

## 2019-05-03 RX ADMIN — GABAPENTIN 800 MG: 400 CAPSULE ORAL at 09:05

## 2019-05-03 RX ADMIN — IPRATROPIUM BROMIDE AND ALBUTEROL SULFATE 3 ML: .5; 3 SOLUTION RESPIRATORY (INHALATION) at 04:05

## 2019-05-03 RX ADMIN — AMLODIPINE BESYLATE 10 MG: 10 TABLET ORAL at 08:05

## 2019-05-03 RX ADMIN — MICONAZOLE NITRATE: 20 OINTMENT TOPICAL at 08:05

## 2019-05-03 RX ADMIN — CARBAMAZEPINE 800 MG: 200 TABLET ORAL at 09:05

## 2019-05-03 RX ADMIN — OXYCODONE HYDROCHLORIDE AND ACETAMINOPHEN 1 TABLET: 5; 325 TABLET ORAL at 11:05

## 2019-05-03 RX ADMIN — FLUOXETINE 40 MG: 20 CAPSULE ORAL at 08:05

## 2019-05-03 RX ADMIN — FLUTICASONE FUROATE AND VILANTEROL TRIFENATATE 1 PUFF: 100; 25 POWDER RESPIRATORY (INHALATION) at 08:05

## 2019-05-03 RX ADMIN — CARBAMAZEPINE 800 MG: 200 TABLET ORAL at 08:05

## 2019-05-03 RX ADMIN — IPRATROPIUM BROMIDE AND ALBUTEROL SULFATE 3 ML: .5; 3 SOLUTION RESPIRATORY (INHALATION) at 11:05

## 2019-05-03 RX ADMIN — CARVEDILOL 6.25 MG: 3.12 TABLET, FILM COATED ORAL at 09:05

## 2019-05-03 RX ADMIN — ALBUTEROL SULFATE 2.5 MG: 2.5 SOLUTION RESPIRATORY (INHALATION) at 04:05

## 2019-05-03 RX ADMIN — STANDARDIZED SENNA CONCENTRATE AND DOCUSATE SODIUM 1 TABLET: 8.6; 5 TABLET, FILM COATED ORAL at 08:05

## 2019-05-03 RX ADMIN — HYDROMORPHONE HYDROCHLORIDE 1 MG: 1 INJECTION, SOLUTION INTRAMUSCULAR; INTRAVENOUS; SUBCUTANEOUS at 04:05

## 2019-05-03 RX ADMIN — CARVEDILOL 6.25 MG: 3.12 TABLET, FILM COATED ORAL at 08:05

## 2019-05-03 RX ADMIN — ENOXAPARIN SODIUM 40 MG: 100 INJECTION SUBCUTANEOUS at 08:05

## 2019-05-03 RX ADMIN — GUAIFENESIN AND DEXTROMETHORPHAN HYDROBROMIDE 1 TABLET: 600; 30 TABLET, EXTENDED RELEASE ORAL at 08:05

## 2019-05-03 RX ADMIN — HYDROMORPHONE HYDROCHLORIDE 1 MG: 1 INJECTION, SOLUTION INTRAMUSCULAR; INTRAVENOUS; SUBCUTANEOUS at 12:05

## 2019-05-03 RX ADMIN — STANDARDIZED SENNA CONCENTRATE AND DOCUSATE SODIUM 1 TABLET: 8.6; 5 TABLET, FILM COATED ORAL at 09:05

## 2019-05-03 RX ADMIN — SODIUM CHLORIDE: 0.45 INJECTION, SOLUTION INTRAVENOUS at 01:05

## 2019-05-03 RX ADMIN — SODIUM CHLORIDE SOLN NEBU 3% 4 ML: 3 NEBU SOLN at 08:05

## 2019-05-03 RX ADMIN — LEVOFLOXACIN 750 MG: 750 TABLET, FILM COATED ORAL at 08:05

## 2019-05-03 RX ADMIN — OXYCODONE HYDROCHLORIDE AND ACETAMINOPHEN 1 TABLET: 5; 325 TABLET ORAL at 01:05

## 2019-05-03 RX ADMIN — ENOXAPARIN SODIUM 40 MG: 100 INJECTION SUBCUTANEOUS at 09:05

## 2019-05-03 RX ADMIN — DIPHENHYDRAMINE HYDROCHLORIDE 25 MG: 25 CAPSULE ORAL at 10:05

## 2019-05-03 NOTE — ASSESSMENT & PLAN NOTE
Suspect PNA complicated by asthma, continues to require oxygen but now improved, previously patient has required significant time to recover which she attributes to her sickle cell   - Continue Levaquin for total of 10 day abx course  - Continue duonebs q4prn with acapella   - Consider CTA if no improvement

## 2019-05-03 NOTE — ASSESSMENT & PLAN NOTE
Heme onc consulted and feels acute chest unlikely,  IV fluids with PRN pain control   - IV dilaudid for pain control and will encourage PO Oxycodone   - Patient previous on fentanyl patch with difficult to control pain in the past   - Plan to transition to PO with plans for discharge 5/03

## 2019-05-03 NOTE — ASSESSMENT & PLAN NOTE
Patient on Norvasc 10mg and HCTZ 25mg at home Hypertensive and tachycardic on presentation, likely 2/2 pain. Patient did not take HCTZ prior to admit.    - Resumed norvasc 10mg  - Holding HCTZ at this time as patient currently in acute vaso-occlusive crisis, would D/C as intravascular depletion could precipitate sickle cell pain crisis in future  - Will continue 6.25 mg dose of coreg PO

## 2019-05-03 NOTE — ASSESSMENT & PLAN NOTE
Resolving, Suspect PNA complicated by asthma, continues to require oxygen but now improved, previously patient has required significant time to recover which she attributes to her sickle cell   - Continue Levaquin for total of 10 day abx course  - Continue duonebs q4prn with acapella   - Consider CTA if no improvement  - She will need referral to sleep medicine for suspected sleep apnea

## 2019-05-03 NOTE — SUBJECTIVE & OBJECTIVE
Interval History:  No acute events overnight. Continue to wean oxygen as tolerated.  Subjectively improved. Tolerating PO.     Review of Systems   Constitutional: Negative for fever.   Respiratory: Positive for cough and shortness of breath.    Cardiovascular: Positive for chest pain. Negative for palpitations.   Gastrointestinal: Negative for abdominal pain.   Musculoskeletal: Positive for arthralgias and back pain.     Objective:     Vital Signs (Most Recent):  Temp: 98.7 °F (37.1 °C) (05/02/19 1342)  Pulse: 80 (05/02/19 1642)  Resp: 18 (05/02/19 1642)  BP: (!) 131/90 (05/02/19 1342)  SpO2: 96 % (05/02/19 1642) Vital Signs (24h Range):  Temp:  [97.2 °F (36.2 °C)-98.7 °F (37.1 °C)] 98.7 °F (37.1 °C)  Pulse:  [70-94] 80  Resp:  [18-22] 18  SpO2:  [93 %-98 %] 96 %  BP: (131-158)/(74-96) 131/90     Weight: 136.1 kg (300 lb)  Body mass index is 54.87 kg/m².    Intake/Output Summary (Last 24 hours) at 5/2/2019 1925  Last data filed at 5/2/2019 0500  Gross per 24 hour   Intake 2700 ml   Output --   Net 2700 ml      Physical Exam   Constitutional: She is oriented to person, place, and time. She appears well-developed and well-nourished. No distress.   HENT:   Head: Normocephalic and atraumatic.   Eyes: No scleral icterus.   Neck: Normal range of motion. Neck supple.   Cardiovascular: Normal rate, regular rhythm, normal heart sounds and intact distal pulses. Exam reveals no friction rub.   No murmur heard.  Pulmonary/Chest: Effort normal. No respiratory distress. She has wheezes (left lung fields).   Abdominal: Soft. Bowel sounds are normal. She exhibits no distension. There is no tenderness. There is no guarding.   Musculoskeletal: Normal range of motion. She exhibits tenderness (bilateral knees and ankles). She exhibits no edema.   Neurological: She is oriented to person, place, and time. No sensory deficit.   Skin: Skin is warm and dry. No rash noted. No erythema. No pallor.   Nursing note and vitals  reviewed.      Significant Labs: All pertinent labs within the past 24 hours have been reviewed.    Significant Imaging: I have reviewed all pertinent imaging results/findings within the past 24 hours.

## 2019-05-03 NOTE — PLAN OF CARE
Problem: Adult Inpatient Plan of Care  Goal: Plan of Care Review  Outcome: Ongoing (interventions implemented as appropriate)  Pt free of fall this shift. Pain assessed and pt c/o generalized pain; prn dilaudid and percocet administered per MAR order. Pt aaox4. Afebrile. Vss. Will continue to monitor pt.

## 2019-05-03 NOTE — PHYSICIAN QUERY
"PT Name: Nazanin Malone  MR #: [id number]    Physician Query Form - Pneumonia Clarification     CDS/: Lashawn Cardenas RN       Contact information:[email or web address]    This form is a permanent document in the medical record.    Query Date:  May 3, 2019    By submitting this query, we are merely seeking further clarification of documentation. Please utilize your independent clinical judgment when addressing the question(s) below.    The Medical record contains the following:   Indicators   Supporting Clinical Findings Location in Medical Record   X "Pneumonia" documented Right lower lobe pneumonia Hospital medicine 5/2   X Chest X-Ray: ". Interval development of patchy airspace opacities within the right lower lobe which are nonspecific and may reflect pneumonia or aspiration" CT chest 4/27   X PaO2    PaCO2     O2 sat PaO2 60, PaCO2 48.4, O2 sat 90 ABG 4/28   X Cultures  Moderate Gram negative rods   Few Gram positive cocci   Rare Gram positive rods  Respiratory culture with gram stain 4/29   X Treatment  Treating for presumed PNA seen on CT  - Continue Levaquin for 10 day course total    Hospital medicine 5/2   X Supplemental O2 O2 2L n/c   CPAP Nursing flow sheets 5/2, 5/3    Other         Provider, please specify type of pneumonia.    [   ] Bacterial Pneumonia (Specify organism):   [   ] Bacterial, Gram Negative organism Pneumonia   [   ] Other type of pneumonia (please specify):   [ x ] Clinically undetermined         Please document in your progress notes daily for the duration of treatment, until resolved, and include in your discharge summary.    .                                                                                    "

## 2019-05-03 NOTE — ASSESSMENT & PLAN NOTE
Heme onc consulted and feels acute chest unlikely,  IV fluids with PRN pain control   - IV dilaudid for pain control and will encourage PO Oxycodone   - Patient previous on fentanyl patch with difficult to control pain in the past

## 2019-05-03 NOTE — SUBJECTIVE & OBJECTIVE
Interval History:  No acute events overnight. Continue to wean oxygen as tolerated.  97% at rest and 92% with exertion.  Subjectively improved.     Review of Systems   Constitutional: Negative for fever.   Respiratory: Positive for cough and shortness of breath.    Cardiovascular: Positive for chest pain. Negative for palpitations.   Gastrointestinal: Negative for abdominal pain.   Musculoskeletal: Positive for arthralgias and back pain.     Objective:     Vital Signs (Most Recent):  Temp: 99 °F (37.2 °C) (05/03/19 1222)  Pulse: 97 (05/03/19 1222)  Resp: 18 (05/03/19 1222)  BP: 130/83 (05/03/19 1222)  SpO2: (!) 94 % (05/03/19 1222) Vital Signs (24h Range):  Temp:  [95.5 °F (35.3 °C)-99 °F (37.2 °C)] 99 °F (37.2 °C)  Pulse:  [80-97] 97  Resp:  [18-22] 18  SpO2:  [94 %-100 %] 94 %  BP: (130-172)/() 130/83     Weight: 136.1 kg (300 lb)  Body mass index is 54.87 kg/m².  No intake or output data in the 24 hours ending 05/03/19 1546   Physical Exam   Constitutional: She is oriented to person, place, and time. She appears well-developed and well-nourished. No distress.   HENT:   Head: Normocephalic and atraumatic.   Eyes: No scleral icterus.   Neck: Normal range of motion. Neck supple.   Cardiovascular: Normal rate, regular rhythm, normal heart sounds and intact distal pulses. Exam reveals no friction rub.   No murmur heard.  Pulmonary/Chest: Effort normal. No respiratory distress. She has wheezes (left lung fields).   Abdominal: Soft. Bowel sounds are normal. She exhibits no distension. There is no tenderness. There is no guarding.   Musculoskeletal: Normal range of motion. She exhibits tenderness (bilateral knees and ankles). She exhibits no edema.   Neurological: She is oriented to person, place, and time. No sensory deficit.   Skin: Skin is warm and dry. No rash noted. No erythema. No pallor.   Nursing note and vitals reviewed.      Significant Labs: All pertinent labs within the past 24 hours have been  reviewed.    Significant Imaging: I have reviewed all pertinent imaging results/findings within the past 24 hours.

## 2019-05-03 NOTE — PROGRESS NOTES
[facility] Hospital Medicine  Progress Note    Patient Name: Nazanin Malone  MRN: [id number]  Patient Class: IP- Inpatient   Admission Date: 4/25/2019  Length of Stay: 8 days  Attending Physician: Cortney Arellano MD  Primary Care Provider: Balta Forbes MD    [facility] Medicine Team: [facility] HOSP MED 5 Pee Lima MD    Subjective:     Principal Problem:Sickle cell disease, type S beta-zero thalassemia with crisis    HPI:  Ms. Malone is a 37 year old lady with a past medical history significant for sickle cell beta-thalassemia, asthma, HTN, and trigeminal neuralgia who presents to [facility] ED with complaints of cough, shortness of breath, and body pain. The patient states symptoms began this morning with a headache. She took home dose of percocet 10mg that did not help her symptoms. She then began to notice bilateral leg and toe, bilateral hip, left arm, and chest pain with breathing which prompted her to come in to the ED for further evaluation. The patient also endorsed associated shortness of breath. She endorses a cough productive of thick white sputum that began overnight as well as fevers (103.1) and chills at home. 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 She endorses good PO intake. She has a history of asthma and tried her albuterol rescue inhaler that did not improve her shortness of breath. The patient is currently on her menstrual cycle and she endorses heavy flow with associated clots which is normal for her. She states that her sickle cell often flares when she is on her cycle. She reports one previous episode of acute chest syndrome in 2017.    The patient follows with Hematology at [facility] for her Sickle beta-thalassemia, last seen in clinic on 1/9/19. Her baseline Hg is 11.8-12.0 and she states she has only ever needed a transfusion when she was 13 years old or so. She had previously required iron infusions in the past, most recently  in July 2018 but has not needed them since. She was previously on hydroxyurea but stopped the medication on her own due to complaints of rash. She was offered L-glutamine but it was not covered by insurance.    In the ED the patient was febrile and required IV narcotics for pain relief. CXR did not show any focal consolidation. She O2 sat was 99% on room air. Given fever and chest pain she was given 1x dose of rocefin and IVFs She was admitted to hospital medicine for sickle cell pain crisis.      Hospital Course:  Patient admitted to hospital medicine for sickle cell pain crisis and shortness of breath. Initially, patient was treated with prn dilaudid and oral oxycodone. She was also started on continuous IVF in the form of lactate ringers and supplemental O2 as needed. Patient still complains of pain, although she primarily uses dilaudid and not much of her oral medication. There was some concern for acute chest, however heme/onc was consulted, reports that this is unlikely to be acute chest syndrome. On the night on 4/27, patient had an infiltrated IV when getting contrast for CT scan. Right arm is swollen, but pulses are present, full ROM, and sensation is intact. This was treated with elevation and alternating hot and cold packs. Swelling on the right forearm reduced considerably. In addition, sputum cultures were collected showing moderate gram negative rods, few gram positive cocci and rare gram positive rods. Changed antibiotics to Levaquin.         Interval History:  No acute events overnight. Continue to wean oxygen as tolerated.  97% at rest and 92% with exertion.  Subjectively improved.     Review of Systems   Constitutional: Negative for fever.   Respiratory: Positive for cough and shortness of breath.    Cardiovascular: Positive for chest pain. Negative for palpitations.   Gastrointestinal: Negative for abdominal pain.   Musculoskeletal: Positive for arthralgias and back pain.     Objective:     Vital  Signs (Most Recent):  Temp: 99 °F (37.2 °C) (05/03/19 1222)  Pulse: 97 (05/03/19 1222)  Resp: 18 (05/03/19 1222)  BP: 130/83 (05/03/19 1222)  SpO2: (!) 94 % (05/03/19 1222) Vital Signs (24h Range):  Temp:  [95.5 °F (35.3 °C)-99 °F (37.2 °C)] 99 °F (37.2 °C)  Pulse:  [80-97] 97  Resp:  [18-22] 18  SpO2:  [94 %-100 %] 94 %  BP: (130-172)/() 130/83     Weight: 136.1 kg (300 lb)  Body mass index is 54.87 kg/m².  No intake or output data in the 24 hours ending 05/03/19 1546   Physical Exam   Constitutional: She is oriented to person, place, and time. She appears well-developed and well-nourished. No distress.   HENT:   Head: Normocephalic and atraumatic.   Eyes: No scleral icterus.   Neck: Normal range of motion. Neck supple.   Cardiovascular: Normal rate, regular rhythm, normal heart sounds and intact distal pulses. Exam reveals no friction rub.   No murmur heard.  Pulmonary/Chest: Effort normal. No respiratory distress. She has wheezes (left lung fields).   Abdominal: Soft. Bowel sounds are normal. She exhibits no distension. There is no tenderness. There is no guarding.   Musculoskeletal: Normal range of motion. She exhibits tenderness (bilateral knees and ankles). She exhibits no edema.   Neurological: She is oriented to person, place, and time. No sensory deficit.   Skin: Skin is warm and dry. No rash noted. No erythema. No pallor.   Nursing note and vitals reviewed.      Significant Labs: All pertinent labs within the past 24 hours have been reviewed.    Significant Imaging: I have reviewed all pertinent imaging results/findings within the past 24 hours.    Assessment/Plan:      * Sickle cell disease, type S beta-zero thalassemia with crisis  Heme onc consulted and feels acute chest unlikely,  IV fluids with PRN pain control   - IV dilaudid for pain control and will encourage PO Oxycodone   - Patient previous on fentanyl patch with difficult to control pain in the past   - Plan to transition to PO with plans  "for discharge 5/03    Anxiety  Patient states she has significant anxiety, describes subclinical "stroke" in past however after discussing with patient she believes it was 2/2 anxiety as her daughter had brought a stranger into their home while the patient was hospitalized. She was experiencing left arm and leg weakness, eye twitching, and difficulty speaking. Symptoms at that time resolved acutely with prn hydralazine. CT head was negative.    Previously on klonopin and remeron however she states she no longer takes them as symptoms well controlled on fluoxetine 40mg    - Continued home fluoxetine      Right lower lobe pneumonia  Treating for presumed PNA seen on CT  - Continue Levaquin for 10 day course total       Morbid obesity due to excess calories  Cardiac diet.  a1c of 5.4      Acute respiratory failure with hypoxia  Resolving, Suspect PNA complicated by asthma, continues to require oxygen but now improved, previously patient has required significant time to recover which she attributes to her sickle cell   - Continue Levaquin for total of 10 day abx course  - Continue duonebs q4prn with acapella   - Consider CTA if no improvement  - She will need referral to sleep medicine for suspected sleep apnea        Benign essential HTN  Patient on Norvasc 10mg and HCTZ 25mg at home Hypertensive and tachycardic on presentation, likely 2/2 pain. Patient did not take HCTZ prior to admit.    - Resumed norvasc 10mg  - Holding HCTZ at this time as patient currently in acute vaso-occlusive crisis, would D/C as intravascular depletion could precipitate sickle cell pain crisis in future  - Will continue 6.25 mg dose of coreg PO      Iron deficiency anemia  Patient with prior history of iron deficiency, presumed due to heavy menses. Last Ferritin in 1/2019 was 29. Followed by hematology, required infusion in the past, most recently in 7/2018.    Hg on admit 11.8 at baseline per chart review. MCV 69, however patient with Beta " thalassemia. Ferritin would likely be elevated 2/2 acute crisis so unlikely helpful in assessing iron status at this time    - CBC daily  - Consider repeat Iron studies once acute chest resolves        VTE Risk Mitigation (From admission, onward)        Ordered     enoxaparin injection 40 mg  Every 12 hours      04/26/19 0014     IP VTE HIGH RISK PATIENT  Once      04/26/19 0014          Dispo: Plan for discharge tomorrow.     Pee Lima MD  Department of Hospital Medicine   [facility]

## 2019-05-04 VITALS
DIASTOLIC BLOOD PRESSURE: 85 MMHG | SYSTOLIC BLOOD PRESSURE: 161 MMHG | BODY MASS INDEX: 53.92 KG/M2 | OXYGEN SATURATION: 94 % | WEIGHT: 293 LBS | HEART RATE: 88 BPM | TEMPERATURE: 99 F | RESPIRATION RATE: 20 BRPM | HEIGHT: 62 IN

## 2019-05-04 PROBLEM — J45.20 INTERMITTENT ASTHMA: Status: ACTIVE | Noted: 2019-04-26

## 2019-05-04 PROCEDURE — 25000003 PHARM REV CODE 250: Performed by: STUDENT IN AN ORGANIZED HEALTH CARE EDUCATION/TRAINING PROGRAM

## 2019-05-04 PROCEDURE — 94668 MNPJ CHEST WALL SBSQ: CPT

## 2019-05-04 PROCEDURE — 94761 N-INVAS EAR/PLS OXIMETRY MLT: CPT

## 2019-05-04 PROCEDURE — 99239 HOSP IP/OBS DSCHRG MGMT >30: CPT | Mod: ,,, | Performed by: INTERNAL MEDICINE

## 2019-05-04 PROCEDURE — 25000242 PHARM REV CODE 250 ALT 637 W/ HCPCS: Performed by: STUDENT IN AN ORGANIZED HEALTH CARE EDUCATION/TRAINING PROGRAM

## 2019-05-04 PROCEDURE — 99239 PR HOSPITAL DISCHARGE DAY,>30 MIN: ICD-10-PCS | Mod: ,,, | Performed by: INTERNAL MEDICINE

## 2019-05-04 PROCEDURE — 63600175 PHARM REV CODE 636 W HCPCS: Performed by: STUDENT IN AN ORGANIZED HEALTH CARE EDUCATION/TRAINING PROGRAM

## 2019-05-04 PROCEDURE — 94664 DEMO&/EVAL PT USE INHALER: CPT

## 2019-05-04 PROCEDURE — 94640 AIRWAY INHALATION TREATMENT: CPT

## 2019-05-04 PROCEDURE — 99900035 HC TECH TIME PER 15 MIN (STAT)

## 2019-05-04 RX ORDER — CARVEDILOL 6.25 MG/1
6.25 TABLET ORAL 2 TIMES DAILY
Qty: 60 TABLET | Refills: 0 | Status: SHIPPED | OUTPATIENT
Start: 2019-05-04 | End: 2019-05-21

## 2019-05-04 RX ORDER — FLUTICASONE PROPIONATE AND SALMETEROL 100; 50 UG/1; UG/1
1 POWDER RESPIRATORY (INHALATION) 2 TIMES DAILY
Qty: 60 EACH | Refills: 3 | Status: ON HOLD | OUTPATIENT
Start: 2019-05-04 | End: 2019-07-31 | Stop reason: SDUPTHER

## 2019-05-04 RX ORDER — HYDROMORPHONE HYDROCHLORIDE 1 MG/ML
1 INJECTION, SOLUTION INTRAMUSCULAR; INTRAVENOUS; SUBCUTANEOUS EVERY 6 HOURS PRN
Status: DISCONTINUED | OUTPATIENT
Start: 2019-05-04 | End: 2019-05-04 | Stop reason: HOSPADM

## 2019-05-04 RX ORDER — LEVOFLOXACIN 750 MG/1
750 TABLET ORAL DAILY
Qty: 5 TABLET | Refills: 0 | Status: SHIPPED | OUTPATIENT
Start: 2019-05-04 | End: 2019-05-21

## 2019-05-04 RX ORDER — GABAPENTIN 800 MG/1
800 TABLET ORAL 2 TIMES DAILY
Start: 2019-05-04 | End: 2019-05-04 | Stop reason: SDUPTHER

## 2019-05-04 RX ORDER — BUDESONIDE AND FORMOTEROL FUMARATE DIHYDRATE 80; 4.5 UG/1; UG/1
2 AEROSOL RESPIRATORY (INHALATION) 2 TIMES DAILY PRN
Qty: 10.2 G | Refills: 3 | Status: ON HOLD | OUTPATIENT
Start: 2019-05-04 | End: 2019-08-01 | Stop reason: SDUPTHER

## 2019-05-04 RX ORDER — GABAPENTIN 800 MG/1
800 TABLET ORAL 2 TIMES DAILY
Qty: 60 TABLET | Refills: 11 | Status: ON HOLD
Start: 2019-05-04 | End: 2020-01-11 | Stop reason: CLARIF

## 2019-05-04 RX ORDER — FLUTICASONE FUROATE AND VILANTEROL 100; 25 UG/1; UG/1
1 POWDER RESPIRATORY (INHALATION) DAILY
Qty: 14 EACH | Refills: 1 | Status: ON HOLD | OUTPATIENT
Start: 2019-05-04 | End: 2019-07-31 | Stop reason: SDUPTHER

## 2019-05-04 RX ADMIN — CARVEDILOL 6.25 MG: 3.12 TABLET, FILM COATED ORAL at 09:05

## 2019-05-04 RX ADMIN — IPRATROPIUM BROMIDE AND ALBUTEROL SULFATE 3 ML: .5; 3 SOLUTION RESPIRATORY (INHALATION) at 08:05

## 2019-05-04 RX ADMIN — HYDROMORPHONE HYDROCHLORIDE 1 MG: 1 INJECTION, SOLUTION INTRAMUSCULAR; INTRAVENOUS; SUBCUTANEOUS at 01:05

## 2019-05-04 RX ADMIN — GABAPENTIN 800 MG: 400 CAPSULE ORAL at 09:05

## 2019-05-04 RX ADMIN — SODIUM CHLORIDE SOLN NEBU 3% 4 ML: 3 NEBU SOLN at 08:05

## 2019-05-04 RX ADMIN — MICONAZOLE NITRATE: 20 OINTMENT TOPICAL at 09:05

## 2019-05-04 RX ADMIN — STANDARDIZED SENNA CONCENTRATE AND DOCUSATE SODIUM 1 TABLET: 8.6; 5 TABLET, FILM COATED ORAL at 09:05

## 2019-05-04 RX ADMIN — AMLODIPINE BESYLATE 10 MG: 10 TABLET ORAL at 09:05

## 2019-05-04 RX ADMIN — FLUOXETINE 40 MG: 20 CAPSULE ORAL at 09:05

## 2019-05-04 RX ADMIN — ALBUTEROL SULFATE 2.5 MG: 2.5 SOLUTION RESPIRATORY (INHALATION) at 03:05

## 2019-05-04 RX ADMIN — CARBAMAZEPINE 800 MG: 200 TABLET ORAL at 09:05

## 2019-05-04 RX ADMIN — IPRATROPIUM BROMIDE AND ALBUTEROL SULFATE 3 ML: .5; 3 SOLUTION RESPIRATORY (INHALATION) at 12:05

## 2019-05-04 RX ADMIN — HYDROMORPHONE HYDROCHLORIDE 1 MG: 1 INJECTION, SOLUTION INTRAMUSCULAR; INTRAVENOUS; SUBCUTANEOUS at 12:05

## 2019-05-04 RX ADMIN — OXYCODONE HYDROCHLORIDE AND ACETAMINOPHEN 1 TABLET: 5; 325 TABLET ORAL at 09:05

## 2019-05-04 RX ADMIN — GUAIFENESIN AND DEXTROMETHORPHAN HYDROBROMIDE 1 TABLET: 600; 30 TABLET, EXTENDED RELEASE ORAL at 09:05

## 2019-05-04 RX ADMIN — FLUTICASONE FUROATE AND VILANTEROL TRIFENATATE 1 PUFF: 100; 25 POWDER RESPIRATORY (INHALATION) at 09:05

## 2019-05-04 RX ADMIN — ENOXAPARIN SODIUM 40 MG: 100 INJECTION SUBCUTANEOUS at 09:05

## 2019-05-04 RX ADMIN — HYDROMORPHONE HYDROCHLORIDE 1 MG: 1 INJECTION, SOLUTION INTRAMUSCULAR; INTRAVENOUS; SUBCUTANEOUS at 06:05

## 2019-05-04 RX ADMIN — LEVOFLOXACIN 750 MG: 750 TABLET, FILM COATED ORAL at 09:05

## 2019-05-04 NOTE — DISCHARGE SUMMARY
[facility] Hospital Medicine  Discharge Summary      Patient Name: Nazanin Malone  MRN: [id number]  Admission Date: 4/25/2019  Hospital Length of Stay: 9 days  Discharge Date and Time:  05/04/2019 1:57 PM  Attending Physician: Kezia att. providers found   Discharging Provider: Ashley Oconnor MD  Primary Care Provider: Balta Forbes MD  [facility] Medicine Team: [facility] [place] [facility] HOSP MED 5 Ashley Oconnor MD    HPI:   Ms. Malone is a 37 year old lady with a past medical history significant for sickle cell beta-thalassemia, asthma, HTN, and trigeminal neuralgia who presents to [facility] ED with complaints of cough, shortness of breath, and body pain. The patient states symptoms began this morning with a headache. She took home dose of percocet 10mg that did not help her symptoms. She then began to notice bilateral leg and toe, bilateral hip, left arm, and chest pain with breathing which prompted her to come in to the ED for further evaluation. The patient also endorsed associated shortness of breath. She endorses a cough productive of thick white sputum that began overnight as well as fevers (103.1) and chills at home. 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 She endorses good PO intake. She has a history of asthma and tried her albuterol rescue inhaler that did not improve her shortness of breath. The patient is currently on her menstrual cycle and she endorses heavy flow with associated clots which is normal for her. She states that her sickle cell often flares when she is on her cycle. She reports one previous episode of acute chest syndrome in 2017.    The patient follows with Hematology at [facility] [place] [facility] for her Sickle beta-thalassemia, last seen in clinic on 1/9/19. Her baseline Hg is 11.8-12.0 and she states she has only ever needed a transfusion when she was 13 years old or so. She had previously required iron infusions in the past, most recently in July 2018 but has not needed  them since. She was previously on hydroxyurea but stopped the medication on her own due to complaints of rash. She was offered L-glutamine but it was not covered by insurance.    In the ED the patient was febrile and required IV narcotics for pain relief. CXR did not show any focal consolidation. She O2 sat was 99% on room air. Given fever and chest pain she was given 1x dose of rocefin and IVFs She was admitted to hospital medicine for sickle cell pain crisis.      * No surgery found *      Hospital Course:   Patient admitted to hospital medicine for sickle cell pain crisis and shortness of breath. Initially, patient was treated with prn dilaudid and oral oxycodone. She was also started on continuous IVF in the form of lactate ringers and supplemental O2 as needed. Patient still complains of pain, although she primarily uses dilaudid and not much of her oral medication. There was some concern for acute chest, however heme/onc was consulted, reports that this is unlikely to be acute chest syndrome. On the night on 4/27, patient had an infiltrated IV when getting contrast for CT scan. Right arm is swollen, but pulses are present, full ROM, and sensation is intact. This was treated with elevation and alternating hot and cold packs. Swelling on the right forearm reduced considerably. In addition, sputum cultures were collected showing moderate gram negative rods, few gram positive cocci and rare gram positive rods. Changed antibiotics to Levaquin.        Physical Exam   Constitutional: She is oriented to person, place, and time. She appears well-developed and well-nourished. No distress.   HENT:   Head: Normocephalic and atraumatic.   Eyes: No scleral icterus.   Neck: Normal range of motion. Neck supple.   Cardiovascular: Normal rate, regular rhythm, normal heart sounds and intact distal pulses. Exam reveals no friction rub.   No murmur heard.  Pulmonary/Chest: Effort normal. No respiratory distress. She has wheezes  (left lung fields).   Abdominal: Soft. Bowel sounds are normal. She exhibits no distension. There is no tenderness. There is no guarding.   Musculoskeletal: Normal range of motion. She exhibits tenderness (bilateral knees and ankles). She exhibits no edema.       Consults:   Consults (From admission, onward)        Status Ordering Provider     Inpatient consult to Hematology  Once     Provider:  (Not yet assigned)    Completed RACHANA ROSA     [facility]  Once     Provider:  (Not yet assigned)    Completed LORENA ZIEGLER     Inpatient [facility] to PICC team [facility])  Once     Provider:  (Not yet assigned)    Completed LORENA ZIEGLER          * Sickle cell disease, type S beta-zero thalassemia with crisis  Heme onc consulted and feels acute chest unlikely,  IV fluids with PRN pain control   - IV dilaudid for pain control and will encourage PO Oxycodone   - Patient previous on fentanyl patch with difficult to control pain in the past   - Plan to transition to PO with plans for discharge 5/03    Intermittent asthma  - treating with prednisone 40 mg for 5 days (complete)      Right lower lobe pneumonia  Treating for presumed PNA seen on CT  - Continue Levaquin for 10 day course total       Morbid obesity due to excess calories  Cardiac diet.  a1c of 5.4      Acute respiratory failure with hypoxia  Resolving, Suspect PNA complicated by asthma, continues to require oxygen but now improved, previously patient has required significant time to recover which she attributes to her sickle cell   - Continue Levaquin for total of 10 day abx course  - Continue duonebs q4prn with acapella   - Consider CTA if no improvement  - She will need referral to sleep medicine for suspected sleep apnea        Benign essential HTN  Patient on Norvasc 10mg and HCTZ 25mg at home Hypertensive and tachycardic on presentation, likely 2/2 pain. Patient did not take HCTZ prior to admit.    - Resumed norvasc 10mg  -  "Holding HCTZ at this time as patient currently in acute vaso-occlusive crisis, would D/C as intravascular depletion could precipitate sickle cell pain crisis in future  - Will continue 6.25 mg dose of coreg PO        Final Active Diagnoses:    Diagnosis Date Noted POA    PRINCIPAL PROBLEM:  Sickle cell disease, type S beta-zero thalassemia with crisis [D57.419] 04/26/2019 Yes    Intermittent asthma [J45.20] 04/26/2019 Unknown    Right lower lobe pneumonia [J18.1] 04/29/2017 Yes    Morbid obesity due to excess calories [E66.01] 04/25/2017 Yes    Acute respiratory failure with hypoxia [J96.01] 04/25/2017 Yes    Benign essential HTN [I10] 04/16/2017 Yes      Problems Resolved During this Admission:    Diagnosis Date Noted Date Resolved POA    Forearm swelling [M79.89] 04/28/2019 05/01/2019 No       Discharged Condition: good    Disposition: Home or Self Care    Follow Up:    Patient Instructions:      NEBULIZER FOR HOME USE     Order Specific Question Answer Comments   Height: 5' 2" (1.575 m)    Weight: 136.1 kg (300 lb)    Does patient have medical equipment at home? none    Length of need (1-99 months): 99      NEBULIZER KIT (SUPPLIES) FOR HOME USE     Order Specific Question Answer Comments   Height: 5' 2" (1.575 m)    Weight: 136.1 kg (300 lb)    Does patient have medical equipment at home? none    Length of need (1-99 months): 99    Mask or Mouthpiece? Mouthpiece      MDI   Standing Status: Future Standing Exp. Date: 07/02/20       Significant Diagnostic Studies: per chart      Pending Diagnostic Studies:     None         Medications:  Reconciled Home Medications:      Medication List      START taking these medications    carvedilol 6.25 MG tablet  Commonly known as:  COREG  Take 1 tablet (6.25 mg total) by mouth 2 (two) times daily.     fluticasone furoate-vilanterol 100-25 mcg/dose diskus inhaler  Commonly known as:  BREO ELLIPTA  Inhale 1 puff into the lungs once daily. Controller   "   fluticasone-salmeterol 100-50 mcg/dose 100-50 mcg/dose diskus inhaler  Commonly known as:  ADVAIR  Inhale 1 puff into the lungs 2 (two) times daily. Controller     gabapentin 800 MG tablet  Commonly known as:  NEURONTIN  Take 1 tablet (800 mg total) by mouth 2 (two) times daily.     levoFLOXacin 750 MG tablet  Commonly known as:  LEVAQUIN  Take 1 tablet (750 mg total) by mouth once daily. for 5 days     SYMBICORT 80-4.5 mcg/actuation Hfaa  Generic drug:  budesonide-formoterol 80-4.5 mcg  Inhale 2 puffs into the lungs 2 (two) times daily as needed. Controller  Replaces:  budesonide-formoterol 160-4.5 mcg 160-4.5 mcg/actuation Hfaa        CHANGE how you take these medications    ergocalciferol 50,000 unit Cap  Commonly known as:  VITAMIN D2  Take 1 capsule (50,000 Units total) by mouth every 7 days.  What changed:  additional instructions     senna-docusate 8.6-50 mg 8.6-50 mg per tablet  Commonly known as:  PERICOLACE  Take 1 tablet by mouth 2 (two) times daily.  What changed:    · when to take this  · reasons to take this        CONTINUE taking these medications    * albuterol 0.63 mg/3 mL Nebu  Commonly known as:  ACCUNEB  Take 3 mLs (0.63 mg total) by nebulization every 6 (six) hours as needed (for wheezing/shortness of breath). Rescue     * albuterol 90 mcg/actuation inhaler  Commonly known as:  VENTOLIN HFA  Inhale 2 puffs into the lungs every 6 (six) hours as needed for Wheezing. Rescue     amLODIPine 10 MG tablet  Commonly known as:  NORVASC  Take 1 tablet (10 mg total) by mouth once daily.     calcium carbonate 200 mg calcium (500 mg) chewable tablet  Commonly known as:  TUMS  Take 1 tablet by mouth daily as needed for Heartburn (stomach).     carBAMazepine 200 mg tablet  Commonly known as:  TEGRETOL  Take 4 tablets (800 mg total) by mouth 2 (two) times daily.     cyclobenzaprine 10 MG tablet  Commonly known as:  FLEXERIL  Take 1 tablet (10 mg total) by mouth 3 (three) times daily as needed.     FLUoxetine  40 MG capsule  TAKE 1 CAPSULE BY MOUTH EVERY DAY     fluticasone propionate 50 mcg/actuation nasal spray  Commonly known as:  FLONASE  2 sprays (100 mcg total) by Each Nare route once daily.     ondansetron 8 MG Tbdl  Commonly known as:  ZOFRAN-ODT  Take 1 tablet (8 mg total) by mouth every 6 (six) hours as needed.     prochlorperazine 10 MG tablet  Commonly known as:  COMPAZINE  Take 1 tablet (10 mg total) by mouth every 6 (six) hours as needed.     promethazine 6.25 mg/5 mL syrup  Commonly known as:  PHENERGAN  Take 20 mLs (25 mg total) by mouth every 6 (six) hours as needed for Nausea.     TYLENOL EXTRA STRENGTH 500 MG tablet  Generic drug:  acetaminophen  Take 1,000 mg by mouth daily as needed for Pain.     VITAMIN C 500 MG tablet  Generic drug:  ascorbic acid (vitamin C)  Take 500 mg by mouth once daily.         * This list has 2 medication(s) that are the same as other medications prescribed for you. Read the directions carefully, and ask your doctor or other care provider to review them with you.            STOP taking these medications    budesonide-formoterol 160-4.5 mcg 160-4.5 mcg/actuation Hfaa  Commonly known as:  SYMBICORT  Replaced by:  SYMBICORT 80-4.5 mcg/actuation Hfaa     hydroCHLOROthiazide 25 MG tablet  Commonly known as:  HYDRODIURIL     mirtazapine 45 MG disintegrating tablet  Commonly known as:  REMERON SOL-TAB        ASK your doctor about these medications    oxyCODONE-acetaminophen  mg per tablet  Commonly known as:  PERCOCET  Take 1 tablet by mouth every 4 (four) hours as needed for Pain.            Indwelling Lines/Drains at time of discharge:   Lines/Drains/Airways          None          Time spent on the discharge of patient: 30 minutes  Patient was seen and examined on the date of discharge and determined to be suitable for discharge.         Ashley Oconnor MD  Department of Hospital Medicine  [facility]

## 2019-05-07 ENCOUNTER — TELEPHONE (OUTPATIENT)
Dept: HEMATOLOGY/ONCOLOGY | Facility: CLINIC | Age: 39
End: 2019-05-07

## 2019-05-07 ENCOUNTER — PATIENT MESSAGE (OUTPATIENT)
Dept: HEMATOLOGY/ONCOLOGY | Facility: CLINIC | Age: 39
End: 2019-05-07

## 2019-05-07 NOTE — TELEPHONE ENCOUNTER
Spoke with patient. Explained that other medications were sent to main pharmacy. Patient stated that Dr. Montgomery's prescriptions were discontinued. Told patient that it will be looked into. Patient responded later that medications were able to be picked up from pharm. Pt messaged that she will send in request for medications that need refills.

## 2019-05-07 NOTE — TELEPHONE ENCOUNTER
----- Message from Xi Aparicio sent at 5/7/2019  8:22 AM CDT -----  Contact: pt     Her med's were discontinued. Was admitted due to her blood pressure being high. That physician gave her a new med but the pharmacy didn't have a order to fill it. She's not sure who would help with it or how she will get the medications she needs  Please contact her directly at [phone]    Thank you

## 2019-05-08 DIAGNOSIS — I10 ESSENTIAL HYPERTENSION: ICD-10-CM

## 2019-05-08 RX ORDER — AMLODIPINE BESYLATE 10 MG/1
10 TABLET ORAL DAILY
Qty: 30 TABLET | Refills: 2 | Status: SHIPPED | OUTPATIENT
Start: 2019-05-08 | End: 2019-08-30 | Stop reason: SDUPTHER

## 2019-05-15 ENCOUNTER — PATIENT MESSAGE (OUTPATIENT)
Dept: HEMATOLOGY/ONCOLOGY | Facility: CLINIC | Age: 39
End: 2019-05-15

## 2019-05-15 DIAGNOSIS — D57.40 SICKLE CELL BETA THALASSEMIA: ICD-10-CM

## 2019-05-15 RX ORDER — OXYCODONE AND ACETAMINOPHEN 10; 325 MG/1; MG/1
1 TABLET ORAL EVERY 4 HOURS PRN
Qty: 120 TABLET | Refills: 0 | Status: SHIPPED | OUTPATIENT
Start: 2019-05-15 | End: 2019-06-13 | Stop reason: SDUPTHER

## 2019-05-20 NOTE — PROGRESS NOTES
SECTION OF HEMATOLOGY AND BONE MARROW TRANSPLANT  Return  Patient Visit   05/21/2019  Referred by:  No ref. provider found  Referred for: sickle beta thal     CHIEF COMPLAINT:   No chief complaint on file.      HISTORY OF PRESENT ILLNESS:   39 y.o. 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  She established care with me in February 2017.    She  moved around country ([place], [place], [place], now back in [place] permanently) and had hematologists taking care of her in each of these respective cities.  Her disease is notable for 1-3 VOC a year requiring ED presentation and admission.  She has episode of acute chest syndrome as young girl.  States she had subclinical stroke with no residual deficits.  Has never been on hydrea. Has history of HTN. Has 2 children.  Trained as LPN . Has only required rare transfusion over the course of her life.    Since our last appt, notes stable VOC  Pain crises frequently.      Last in ED 4/25/19 with pain crisis.  In ED 3/3/19 with PNA. Sent out on course of levaquin.     Today patient feels well. She has completed course of levaquin. Mild pain noted to lower extremities. Denies fevers, chills, night sweats, chest pain, SOB, ulcerations to BLE, edema, nausea, vomiting, diarrhea, constipation. BP elevated today in clinic. Patient reports compliance with amlodipine and HCTZ. She has not been taking Coreg. Restart coreg 12.5 mg BID today.         PAST MEDICAL HISTORY:   Past Medical History:   Diagnosis Date    Abnormal Pap smear of cervix 2013    colposcopy    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 4/29/2017    Asthma     Depression     Hypertension     Morbid obesity     Opioid dependence 4/16/2017    Pneumonia due to Streptococcus pneumoniae 4/25/2017    Sepsis due to Streptococcus pneumoniae 4/25/2017    Sickle cell-beta thalassemia disease with pain     Trigeminal neuralgia        PAST SURGICAL HISTORY:   Past Surgical  History:   Procedure Laterality Date     SECTION      TONSILLECTOMY      TUBAL LIGATION         PAST SOCIAL HISTORY:   reports that she has never smoked. She has never used smokeless tobacco. She reports that she does not drink alcohol or use drugs.    FAMILY HISTORY:  Family History   Problem Relation Age of Onset    Heart disease Mother     Diabetes Mother     Heart disease Father     Breast cancer Neg Hx     Ovarian cancer Neg Hx     Colon cancer Neg Hx        CURRENT MEDICATIONS:   Current Outpatient Medications   Medication Sig    acetaminophen (TYLENOL EXTRA STRENGTH) 500 MG tablet Take 1,000 mg by mouth daily as needed for Pain.    albuterol (ACCUNEB) 0.63 mg/3 mL Nebu Take 3 mLs (0.63 mg total) by nebulization every 6 (six) hours as needed (for wheezing/shortness of breath). Rescue    albuterol (VENTOLIN HFA) 90 mcg/actuation inhaler Inhale 2 puffs into the lungs every 6 (six) hours as needed for Wheezing. Rescue    amLODIPine (NORVASC) 10 MG tablet Take 1 tablet (10 mg total) by mouth once daily.    ascorbic acid, vitamin C, (VITAMIN C) 500 MG tablet Take 500 mg by mouth once daily.    budesonide-formoterol 80-4.5 mcg (SYMBICORT) 80-4.5 mcg/actuation HFAA Inhale 2 puffs into the lungs 2 (two) times daily as needed. Controller    calcium carbonate (TUMS) 200 mg calcium (500 mg) chewable tablet Take 1 tablet by mouth daily as needed for Heartburn (stomach).    carBAMazepine (TEGRETOL) 200 mg tablet Take 4 tablets (800 mg total) by mouth 2 (two) times daily.    carvedilol (COREG) 12.5 MG tablet Take 1 tablet (12.5 mg total) by mouth 2 (two) times daily.    cyclobenzaprine (FLEXERIL) 10 MG tablet Take 1 tablet (10 mg total) by mouth 3 (three) times daily as needed.    ergocalciferol (VITAMIN D2) 50,000 unit Cap Take 1 capsule (50,000 Units total) by mouth every 7 days. (Patient taking differently: Take 50,000 Units by mouth every 7 days. Tuesday)    FLUoxetine 40 MG capsule TAKE 1  CAPSULE BY MOUTH EVERY DAY    fluticasone (FLONASE) 50 mcg/actuation nasal spray 2 sprays (100 mcg total) by Each Nare route once daily.    fluticasone furoate-vilanterol (BREO ELLIPTA) 100-25 mcg/dose diskus inhaler Inhale 1 puff into the lungs once daily. Controller    fluticasone-salmeterol diskus inhaler 100-50 mcg Inhale 1 puff into the lungs 2 (two) times daily. Controller    gabapentin (NEURONTIN) 800 MG tablet Take 1 tablet (800 mg total) by mouth 2 (two) times daily.    levoFLOXacin (LEVAQUIN) 750 MG tablet Take 1 tablet (750 mg total) by mouth once daily. for 5 days    ondansetron (ZOFRAN-ODT) 8 MG TbDL Take 1 tablet (8 mg total) by mouth every 6 (six) hours as needed.    oxyCODONE-acetaminophen (PERCOCET)  mg per tablet Take 1 tablet by mouth every 4 (four) hours as needed for Pain.    prochlorperazine (COMPAZINE) 10 MG tablet Take 1 tablet (10 mg total) by mouth every 6 (six) hours as needed.    promethazine (PHENERGAN) 6.25 mg/5 mL syrup Take 20 mLs (25 mg total) by mouth every 6 (six) hours as needed for Nausea.    senna-docusate 8.6-50 mg (PERICOLACE) 8.6-50 mg per tablet Take 1 tablet by mouth 2 (two) times daily. (Patient taking differently: Take 1 tablet by mouth 2 (two) times daily as needed for Constipation. )     No current facility-administered medications for this visit.      ALLERGIES:   Review of patient's allergies indicates:  No Known Allergies      REVIEW OF SYSTEMS:   General ROS: negative  Psychological ROS: negative  Ophthalmic ROS: negative  ENT ROS: negative  Allergy and Immunology ROS: negative  Hematological and Lymphatic ROS: negative  Endocrine ROS: negative  Respiratory ROS: negative  Cardiovascular ROS: negative  Gastrointestinal ROS: negative  Genito-Urinary ROS: negative  Musculoskeletal ROS: see HPI  Neurological ROS: negative  Dermatological ROS: negative    PHYSICAL EXAM:   Vitals:    05/21/19 1100   BP: (!) 170/102   Pulse:    Resp:    Temp:        General  - well developed, well nourished, no apparent distress, Obese  Head & Face - no sinus tenderness  Eyes - normal conjunctivae and lids   ENT - normal external auditory canals and tympanic membranes bilaterally oropharynx clear,  Normal dentition and gums  Neck - normal thyroid  Chest and Lung - normal respiratory effort, clear to auscultation bilaterally   Cardiovascular - RRR with no MGR, normal S1 and S2; no pedal edema  Abdomen -  soft, nontender, no palpable hepatomegaly or splenomegaly  Lymph - no palpable lymphadenopathy  Extremities - unremarkable nails and digits  Heme - no bruising, petechiae, pallor  Skin - no rashes or lesions  Psych - appropriate mood and affect      ECOG Performance Status: (foot note - ECOG PS provided by [employer]cology [employer]) 1 - Symptomatic but completely ambulatory    Karnofsky Performance Score:  90%- Able to Carry on Normal Activity: Minor Symptoms of Disease  DATA:   Lab Results   Component Value Date    WBC 6.38 05/21/2019    HGB 10.9 (L) 05/21/2019    HCT 32.8 (L) 05/21/2019    MCV 68 (L) 05/21/2019     05/21/2019     Gran # (ANC)   Date Value Ref Range Status   05/21/2019 1.8 1.8 - 7.7 K/uL Final     Gran%   Date Value Ref Range Status   05/21/2019 28.6 (L) 38.0 - 73.0 % Final     Lymph #   Date Value Ref Range Status   05/21/2019 3.6 1.0 - 4.8 K/uL Final     Lymph%   Date Value Ref Range Status   05/21/2019 56.0 (H) 18.0 - 48.0 % Final     CMP  Sodium   Date Value Ref Range Status   05/21/2019 139 136 - 145 mmol/L Final     Potassium   Date Value Ref Range Status   05/21/2019 3.6 3.5 - 5.1 mmol/L Final     Chloride   Date Value Ref Range Status   05/21/2019 106 95 - 110 mmol/L Final     CO2   Date Value Ref Range Status   05/21/2019 24 23 - 29 mmol/L Final     Glucose   Date Value Ref Range Status   05/21/2019 103 70 - 110 mg/dL Final     BUN, Bld   Date Value Ref Range Status   05/21/2019 13 6 - 20 mg/dL Final     Creatinine   Date Value Ref Range Status    05/21/2019 0.8 0.5 - 1.4 mg/dL Final     Calcium   Date Value Ref Range Status   05/21/2019 9.4 8.7 - 10.5 mg/dL Final     Total Protein   Date Value Ref Range Status   05/21/2019 7.3 6.0 - 8.4 g/dL Final     Albumin   Date Value Ref Range Status   05/21/2019 3.6 3.5 - 5.2 g/dL Final     Total Bilirubin   Date Value Ref Range Status   05/21/2019 0.3 0.1 - 1.0 mg/dL Final     Comment:     For infants and newborns, interpretation of results should be based  on gestational age, weight and in agreement with clinical  observations.  Premature Infant recommended reference ranges:  Up to 24 hours.............<8.0 mg/dL  Up to 48 hours............<12.0 mg/dL  3-5 days..................<15.0 mg/dL  6-29 days.................<15.0 mg/dL       Alkaline Phosphatase   Date Value Ref Range Status   05/21/2019 76 55 - 135 U/L Final     AST   Date Value Ref Range Status   05/21/2019 16 10 - 40 U/L Final     ALT   Date Value Ref Range Status   05/21/2019 12 10 - 44 U/L Final     Anion Gap   Date Value Ref Range Status   05/21/2019 9 8 - 16 mmol/L Final     eGFR if    Date Value Ref Range Status   05/21/2019 >60.0 >60 mL/min/1.73 m^2 Final     eGFR if non    Date Value Ref Range Status   05/21/2019 >60.0 >60 mL/min/1.73 m^2 Final     Comment:     Calculation used to obtain the estimated glomerular filtration  rate (eGFR) is the CKD-EPI equation.        Quant 2.2 - 3.2 % 5.9    Hemoglobin Bands   Hb A , Hb S , Hb F , Hb A2   Hemoglobin Electrophoresis Interp   See comment   Comments: There are prominent bands in the A and S positions,   measuring approximately 20 % and 66 % respectively with a hemoglobin   F band of approximately 8% and an elevated hemoglobin A2.     This pattern suggests sickle cell/beta + thalassemia, provided that   there is no history of recent RBC transfusion.  Await acid   electrophoresis   for confirmation of hemoglobin S.   Interpreted by Violette Sutherland M.D.           ASSESSMENT AND PLAN:   Encounter Diagnoses   Name Primary?   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  -discussed  l-glutamine today and patient would like to attempt; prescribed  15 g BID (8/20/18) but insurance refused to cover.  - discussed IVF PRN in infusion center  - reports pain crisis once every 2-3 months    2)iron status   -history of iron deficiency from menorrhagnia, likely contributing to fatigue/anemia  -received  IV feraheme x 2 march 2017 with return of her hgb back to baseline and normalized ferritin   -required repeat iron infusion July 2018 (only showed up for 1 infusion); hgb baseline and ferritin wnl at last visit  - repeat ferritin today 20.0.     3)AVN  -has chronic bilateral hip pain; stable  -will discuss obtaining MRI if pain worsens    4)LE Ulcerations   NA    5)HTN  -continue norvasc 10; takes nightly  - continue hctz to 25mg daily (8/20/18)  ; takes nightly  - Coreg 12.5mg BID start today (5/21/19); monitor side effects with history of asthma  - f/u 1 week with BP check   - Consider adding Nifedipine 20mg extended release QHS at next visit if BP still elevated      6)Opthalmic  -encouraged her to make appt with ophthalmology; states she will make appt    7)Pain  -continue home percocet 10 q 6 hrs     8)CardioPulmonary  -states  Had normal TTE jan 2019; next due jan 2021  -continue inhalers    9)Renal  - feb 2018 UA with no proteinuria; [email or web address]    10)Neuro/CVA  -gives anecdotal history of subclinical stroke with no deficits  -will monitor    11)Vaccines   -receiving HIB  series, menactra, prevnar followed by pneumovax      12)Contraception  -she has had tubal ligation     -encouraged her to set up new pcp and gyne     Follow Up:  -cbc, cmp, retic, ferritin, folate, type and screen, and MD appt (Dr. Montgomery) in 3 months   See in 1 week for BP check and visit

## 2019-05-21 ENCOUNTER — LAB VISIT (OUTPATIENT)
Dept: LAB | Facility: HOSPITAL | Age: 39
End: 2019-05-21
Payer: MEDICAID

## 2019-05-21 ENCOUNTER — OFFICE VISIT (OUTPATIENT)
Dept: HEMATOLOGY/ONCOLOGY | Facility: CLINIC | Age: 39
End: 2019-05-21
Payer: MEDICAID

## 2019-05-21 VITALS
TEMPERATURE: 99 F | SYSTOLIC BLOOD PRESSURE: 170 MMHG | BODY MASS INDEX: 53.92 KG/M2 | DIASTOLIC BLOOD PRESSURE: 102 MMHG | WEIGHT: 293 LBS | OXYGEN SATURATION: 98 % | RESPIRATION RATE: 19 BRPM | HEART RATE: 109 BPM | HEIGHT: 62 IN

## 2019-05-21 DIAGNOSIS — G50.0 TRIGEMINAL NEURALGIA: ICD-10-CM

## 2019-05-21 DIAGNOSIS — I10 ESSENTIAL HYPERTENSION: ICD-10-CM

## 2019-05-21 DIAGNOSIS — D57.1 HB-SS DISEASE WITHOUT CRISIS: Primary | ICD-10-CM

## 2019-05-21 DIAGNOSIS — D57.1 HB-SS DISEASE WITHOUT CRISIS: ICD-10-CM

## 2019-05-21 DIAGNOSIS — D57.40 SICKLE CELL BETA THALASSEMIA: ICD-10-CM

## 2019-05-21 PROBLEM — J18.9 RIGHT LOWER LOBE PNEUMONIA: Status: RESOLVED | Noted: 2017-04-29 | Resolved: 2019-05-21

## 2019-05-21 PROBLEM — J96.01 ACUTE RESPIRATORY FAILURE WITH HYPOXIA: Status: RESOLVED | Noted: 2017-04-25 | Resolved: 2019-05-21

## 2019-05-21 PROBLEM — F11.20 OPIOID DEPENDENCE: Status: RESOLVED | Noted: 2017-04-16 | Resolved: 2019-05-21

## 2019-05-21 LAB
ABO + RH BLD: NORMAL
ALBUMIN SERPL BCP-MCNC: 3.6 G/DL (ref 3.5–5.2)
ALP SERPL-CCNC: 76 U/L (ref 55–135)
ALT SERPL W/O P-5'-P-CCNC: 12 U/L (ref 10–44)
ANION GAP SERPL CALC-SCNC: 9 MMOL/L (ref 8–16)
AST SERPL-CCNC: 16 U/L (ref 10–40)
BASOPHILS # BLD AUTO: 0.03 K/UL (ref 0–0.2)
BASOPHILS NFR BLD: 0.5 % (ref 0–1.9)
BILIRUB SERPL-MCNC: 0.3 MG/DL (ref 0.1–1)
BLD GP AB SCN CELLS X3 SERPL QL: NORMAL
BUN SERPL-MCNC: 13 MG/DL (ref 6–20)
CALCIUM SERPL-MCNC: 9.4 MG/DL (ref 8.7–10.5)
CHLORIDE SERPL-SCNC: 106 MMOL/L (ref 95–110)
CO2 SERPL-SCNC: 24 MMOL/L (ref 23–29)
CREAT SERPL-MCNC: 0.8 MG/DL (ref 0.5–1.4)
DIFFERENTIAL METHOD: ABNORMAL
EOSINOPHIL # BLD AUTO: 0.3 K/UL (ref 0–0.5)
EOSINOPHIL NFR BLD: 3.9 % (ref 0–8)
ERYTHROCYTE [DISTWIDTH] IN BLOOD BY AUTOMATED COUNT: 17.5 % (ref 11.5–14.5)
EST. GFR  (AFRICAN AMERICAN): >60 ML/MIN/1.73 M^2
EST. GFR  (NON AFRICAN AMERICAN): >60 ML/MIN/1.73 M^2
FERRITIN SERPL-MCNC: 20 NG/ML (ref 20–300)
GLUCOSE SERPL-MCNC: 103 MG/DL (ref 70–110)
HCT VFR BLD AUTO: 32.8 % (ref 37–48.5)
HGB BLD-MCNC: 10.9 G/DL (ref 12–16)
IMM GRANULOCYTES # BLD AUTO: 0 K/UL (ref 0–0.04)
IMM GRANULOCYTES NFR BLD AUTO: 0 % (ref 0–0.5)
LYMPHOCYTES # BLD AUTO: 3.6 K/UL (ref 1–4.8)
LYMPHOCYTES NFR BLD: 56 % (ref 18–48)
MCH RBC QN AUTO: 22.6 PG (ref 27–31)
MCHC RBC AUTO-ENTMCNC: 33.2 G/DL (ref 32–36)
MCV RBC AUTO: 68 FL (ref 82–98)
MONOCYTES # BLD AUTO: 0.7 K/UL (ref 0.3–1)
MONOCYTES NFR BLD: 11 % (ref 4–15)
NEUTROPHILS # BLD AUTO: 1.8 K/UL (ref 1.8–7.7)
NEUTROPHILS NFR BLD: 28.6 % (ref 38–73)
NRBC BLD-RTO: 1 /100 WBC
PLATELET # BLD AUTO: 247 K/UL (ref 150–350)
PMV BLD AUTO: 9.4 FL (ref 9.2–12.9)
POTASSIUM SERPL-SCNC: 3.6 MMOL/L (ref 3.5–5.1)
PROT SERPL-MCNC: 7.3 G/DL (ref 6–8.4)
RBC # BLD AUTO: 4.82 M/UL (ref 4–5.4)
RETICS/RBC NFR AUTO: 2.9 % (ref 0.5–2.5)
SODIUM SERPL-SCNC: 139 MMOL/L (ref 136–145)
WBC # BLD AUTO: 6.38 K/UL (ref 3.9–12.7)

## 2019-05-21 PROCEDURE — 82728 ASSAY OF FERRITIN: CPT

## 2019-05-21 PROCEDURE — 99214 OFFICE O/P EST MOD 30 MIN: CPT | Mod: S$PBB,,, | Performed by: NURSE PRACTITIONER

## 2019-05-21 PROCEDURE — 86850 RBC ANTIBODY SCREEN: CPT

## 2019-05-21 PROCEDURE — 99999 PR PBB SHADOW E&M-EST. PATIENT-LVL III: ICD-10-PCS | Mod: PBBFAC,,, | Performed by: NURSE PRACTITIONER

## 2019-05-21 PROCEDURE — 99214 PR OFFICE/OUTPT VISIT, EST, LEVL IV, 30-39 MIN: ICD-10-PCS | Mod: S$PBB,,, | Performed by: NURSE PRACTITIONER

## 2019-05-21 PROCEDURE — 85045 AUTOMATED RETICULOCYTE COUNT: CPT

## 2019-05-21 PROCEDURE — 99999 PR PBB SHADOW E&M-EST. PATIENT-LVL III: CPT | Mod: PBBFAC,,, | Performed by: NURSE PRACTITIONER

## 2019-05-21 PROCEDURE — 85025 COMPLETE CBC W/AUTO DIFF WBC: CPT

## 2019-05-21 PROCEDURE — 99213 OFFICE O/P EST LOW 20 MIN: CPT | Mod: PBBFAC,25 | Performed by: NURSE PRACTITIONER

## 2019-05-21 PROCEDURE — 80053 COMPREHEN METABOLIC PANEL: CPT

## 2019-05-21 RX ORDER — CARBAMAZEPINE 200 MG/1
800 TABLET ORAL 2 TIMES DAILY
Qty: 240 TABLET | Refills: 5 | Status: SHIPPED | OUTPATIENT
Start: 2019-05-21 | End: 2019-05-23 | Stop reason: SDUPTHER

## 2019-05-21 RX ORDER — CARVEDILOL 12.5 MG/1
12.5 TABLET ORAL 2 TIMES DAILY
Qty: 60 TABLET | Refills: 11 | Status: ON HOLD | OUTPATIENT
Start: 2019-05-21 | End: 2019-08-01 | Stop reason: SDUPTHER

## 2019-05-21 NOTE — TELEPHONE ENCOUNTER
----- Message from Chasity Eaton sent at 5/21/2019  2:12 PM CDT -----  Pt asked for a refill on her carbamazepine

## 2019-05-22 DIAGNOSIS — D57.40 SICKLE CELL BETA THALASSEMIA: ICD-10-CM

## 2019-05-22 RX ORDER — CYCLOBENZAPRINE HCL 10 MG
10 TABLET ORAL 3 TIMES DAILY
Qty: 90 TABLET | Refills: 11 | Status: ON HOLD | OUTPATIENT
Start: 2019-05-22 | End: 2019-08-01 | Stop reason: SDUPTHER

## 2019-05-23 DIAGNOSIS — G50.0 TRIGEMINAL NEURALGIA: ICD-10-CM

## 2019-05-23 RX ORDER — CARBAMAZEPINE 200 MG/1
800 TABLET ORAL 2 TIMES DAILY
Qty: 240 TABLET | Refills: 5 | Status: SHIPPED | OUTPATIENT
Start: 2019-05-23 | End: 2021-01-14 | Stop reason: SDUPTHER

## 2019-06-06 ENCOUNTER — TELEPHONE (OUTPATIENT)
Dept: INTERNAL MEDICINE | Facility: CLINIC | Age: 39
End: 2019-06-06

## 2019-06-06 NOTE — TELEPHONE ENCOUNTER
----- Message from Bayron Sue sent at 6/6/2019 11:33 AM CDT -----  Contact: [facility] [phone]  Pharmacy is calling to clarify an RX.  RX name:  fluticasone-salmeterol diskus inhaler 100-50 mcg   And   fluticasone furoate-vilanterol (BREO ELLIPTA) 100-25 mcg/dose diskus inhaler    What do they need to clarify:  Both Rx's are needing P.A.  Comments: request was sent on 05/04/19    Please call an advise  Thank you

## 2019-06-13 ENCOUNTER — PATIENT MESSAGE (OUTPATIENT)
Dept: HEMATOLOGY/ONCOLOGY | Facility: CLINIC | Age: 39
End: 2019-06-13

## 2019-06-13 DIAGNOSIS — R11.0 NAUSEA: ICD-10-CM

## 2019-06-13 DIAGNOSIS — D57.40 SICKLE CELL BETA THALASSEMIA: ICD-10-CM

## 2019-06-13 DIAGNOSIS — E55.9 VITAMIN D DEFICIENCY: ICD-10-CM

## 2019-06-13 RX ORDER — PROCHLORPERAZINE MALEATE 10 MG
10 TABLET ORAL EVERY 6 HOURS PRN
Qty: 30 TABLET | Refills: 1 | Status: SHIPPED | OUTPATIENT
Start: 2019-06-13 | End: 2020-03-05 | Stop reason: ALTCHOICE

## 2019-06-13 RX ORDER — ONDANSETRON 8 MG/1
8 TABLET, ORALLY DISINTEGRATING ORAL EVERY 6 HOURS PRN
Qty: 25 TABLET | Refills: 0 | Status: SHIPPED | OUTPATIENT
Start: 2019-06-13 | End: 2020-03-05 | Stop reason: SDUPTHER

## 2019-06-13 RX ORDER — ONDANSETRON 8 MG/1
8 TABLET, ORALLY DISINTEGRATING ORAL EVERY 6 HOURS PRN
Qty: 25 TABLET | Refills: 0 | Status: ON HOLD | OUTPATIENT
Start: 2019-06-13 | End: 2019-07-31

## 2019-06-13 RX ORDER — OXYCODONE AND ACETAMINOPHEN 10; 325 MG/1; MG/1
1 TABLET ORAL EVERY 4 HOURS PRN
Qty: 120 TABLET | Refills: 0 | Status: SHIPPED | OUTPATIENT
Start: 2019-06-13 | End: 2019-07-12 | Stop reason: SDUPTHER

## 2019-06-13 RX ORDER — ERGOCALCIFEROL 1.25 MG/1
50000 CAPSULE ORAL
Qty: 12 CAPSULE | Refills: 2 | Status: ON HOLD | OUTPATIENT
Start: 2019-06-13 | End: 2019-08-01 | Stop reason: SDUPTHER

## 2019-06-19 DIAGNOSIS — J45.40 MODERATE PERSISTENT ASTHMA WITHOUT COMPLICATION: ICD-10-CM

## 2019-06-19 RX ORDER — ALBUTEROL SULFATE 90 UG/1
AEROSOL, METERED RESPIRATORY (INHALATION)
Qty: 18 INHALER | Refills: 1 | Status: ON HOLD | OUTPATIENT
Start: 2019-06-19 | End: 2019-07-31

## 2019-06-20 DIAGNOSIS — I10 ESSENTIAL HYPERTENSION: Primary | ICD-10-CM

## 2019-06-25 RX ORDER — HYDROCHLOROTHIAZIDE 25 MG/1
25 TABLET ORAL DAILY
Qty: 30 TABLET | Refills: 11 | Status: SHIPPED | OUTPATIENT
Start: 2019-06-25 | End: 2020-07-29

## 2019-07-12 ENCOUNTER — TELEPHONE (OUTPATIENT)
Dept: HEMATOLOGY/ONCOLOGY | Facility: CLINIC | Age: 39
End: 2019-07-12

## 2019-07-12 ENCOUNTER — PATIENT MESSAGE (OUTPATIENT)
Dept: HEMATOLOGY/ONCOLOGY | Facility: CLINIC | Age: 39
End: 2019-07-12

## 2019-07-12 DIAGNOSIS — D57.40 SICKLE CELL BETA THALASSEMIA: ICD-10-CM

## 2019-07-12 RX ORDER — OXYCODONE AND ACETAMINOPHEN 10; 325 MG/1; MG/1
1 TABLET ORAL EVERY 4 HOURS PRN
Qty: 120 TABLET | Refills: 0 | Status: SHIPPED | OUTPATIENT
Start: 2019-07-12 | End: 2019-08-09 | Stop reason: SDUPTHER

## 2019-07-12 NOTE — TELEPHONE ENCOUNTER
----- Message from Xi Aparicio sent at 7/12/2019 10:05 AM CDT -----  -  oxyCODONE-acetaminophen (PERCOCET)  mg per tablet   Pharmacy:  [facility]    NOTE: pt states that she will be leaving town soon and asking if the refill can be sent over as quickly as possible so that she can pick it up

## 2019-07-17 ENCOUNTER — TELEPHONE (OUTPATIENT)
Dept: INTERNAL MEDICINE | Facility: CLINIC | Age: 39
End: 2019-07-17

## 2019-07-17 NOTE — TELEPHONE ENCOUNTER
"----- Message from Emma Silva sent at 7/17/2019 11:18 AM CDT -----  Prior Authorization Needed    Rx: fluticasone furoate-vilanterol (BREO ELLIPTA) 100-25 mcg/dose diskus inhaler    To submit the PA:    1: Go to " [email or web address] " and click "Enter a Key"    2. Enter the patient's last name and date of birth and the key.      KEY: [id number]    3. Complete the forms and click "send to Plan"    Note chart when prior authorization has been submitted.    Please notify pharmacy when prior authorization has been approved.    Thank You    "

## 2019-07-30 ENCOUNTER — HOSPITAL ENCOUNTER (INPATIENT)
Facility: HOSPITAL | Age: 39
LOS: 5 days | Discharge: HOME | DRG: 202 | End: 2019-08-04
Attending: EMERGENCY MEDICINE | Admitting: EMERGENCY MEDICINE
Payer: MEDICAID

## 2019-07-30 ENCOUNTER — TELEPHONE (OUTPATIENT)
Dept: HEMATOLOGY/ONCOLOGY | Facility: CLINIC | Age: 39
End: 2019-07-30

## 2019-07-30 DIAGNOSIS — D57.00 SICKLE CELL PAIN CRISIS: Primary | ICD-10-CM

## 2019-07-30 DIAGNOSIS — R06.02 SHORTNESS OF BREATH: ICD-10-CM

## 2019-07-30 DIAGNOSIS — E87.70 VOLUME EXCESS: ICD-10-CM

## 2019-07-30 DIAGNOSIS — E55.9 VITAMIN D DEFICIENCY: ICD-10-CM

## 2019-07-30 DIAGNOSIS — J45.901 ASTHMA WITH ACUTE EXACERBATION, UNSPECIFIED ASTHMA SEVERITY, UNSPECIFIED WHETHER PERSISTENT: ICD-10-CM

## 2019-07-30 DIAGNOSIS — J45.21 INTERMITTENT ASTHMA WITH ACUTE EXACERBATION, UNSPECIFIED ASTHMA SEVERITY: ICD-10-CM

## 2019-07-30 DIAGNOSIS — D57.40 SICKLE CELL BETA THALASSEMIA: ICD-10-CM

## 2019-07-30 DIAGNOSIS — I10 ESSENTIAL HYPERTENSION: ICD-10-CM

## 2019-07-30 DIAGNOSIS — J96.00 ACUTE RESPIRATORY FAILURE, UNSPECIFIED WHETHER WITH HYPOXIA OR HYPERCAPNIA: ICD-10-CM

## 2019-07-30 DIAGNOSIS — J45.40 MODERATE PERSISTENT ASTHMA WITHOUT COMPLICATION: ICD-10-CM

## 2019-07-30 DIAGNOSIS — J40 BRONCHITIS: ICD-10-CM

## 2019-07-30 LAB
ALBUMIN SERPL BCP-MCNC: 3.6 G/DL (ref 3.5–5.2)
ALP SERPL-CCNC: 93 U/L (ref 55–135)
ALT SERPL W/O P-5'-P-CCNC: 14 U/L (ref 10–44)
ANION GAP SERPL CALC-SCNC: 10 MMOL/L (ref 8–16)
AST SERPL-CCNC: 17 U/L (ref 10–40)
B-HCG UR QL: NEGATIVE
BASOPHILS # BLD AUTO: 0.03 K/UL (ref 0–0.2)
BASOPHILS NFR BLD: 0.4 % (ref 0–1.9)
BILIRUB SERPL-MCNC: 0.3 MG/DL (ref 0.1–1)
BNP SERPL-MCNC: 27 PG/ML (ref 0–99)
BUN SERPL-MCNC: 9 MG/DL (ref 6–20)
CALCIUM SERPL-MCNC: 9 MG/DL (ref 8.7–10.5)
CHLORIDE SERPL-SCNC: 106 MMOL/L (ref 95–110)
CO2 SERPL-SCNC: 23 MMOL/L (ref 23–29)
CREAT SERPL-MCNC: 0.8 MG/DL (ref 0.5–1.4)
CTP QC/QA: YES
DIFFERENTIAL METHOD: ABNORMAL
EOSINOPHIL # BLD AUTO: 0.2 K/UL (ref 0–0.5)
EOSINOPHIL NFR BLD: 2.6 % (ref 0–8)
ERYTHROCYTE [DISTWIDTH] IN BLOOD BY AUTOMATED COUNT: 18.4 % (ref 11.5–14.5)
EST. GFR  (AFRICAN AMERICAN): >60 ML/MIN/1.73 M^2
EST. GFR  (NON AFRICAN AMERICAN): >60 ML/MIN/1.73 M^2
GLUCOSE SERPL-MCNC: 155 MG/DL (ref 70–110)
HCT VFR BLD AUTO: 34.6 % (ref 37–48.5)
HGB BLD-MCNC: 11 G/DL (ref 12–16)
IMM GRANULOCYTES # BLD AUTO: 0.02 K/UL (ref 0–0.04)
IMM GRANULOCYTES NFR BLD AUTO: 0.3 % (ref 0–0.5)
LACTATE SERPL-SCNC: 1.6 MMOL/L (ref 0.5–2.2)
LYMPHOCYTES # BLD AUTO: 1.7 K/UL (ref 1–4.8)
LYMPHOCYTES NFR BLD: 24 % (ref 18–48)
MCH RBC QN AUTO: 21.3 PG (ref 27–31)
MCHC RBC AUTO-ENTMCNC: 31.8 G/DL (ref 32–36)
MCV RBC AUTO: 67 FL (ref 82–98)
MONOCYTES # BLD AUTO: 0.5 K/UL (ref 0.3–1)
MONOCYTES NFR BLD: 6.5 % (ref 4–15)
NEUTROPHILS # BLD AUTO: 4.6 K/UL (ref 1.8–7.7)
NEUTROPHILS NFR BLD: 66.2 % (ref 38–73)
NRBC BLD-RTO: 0 /100 WBC
PLATELET # BLD AUTO: 251 K/UL (ref 150–350)
PMV BLD AUTO: 9.8 FL (ref 9.2–12.9)
POTASSIUM SERPL-SCNC: 3.6 MMOL/L (ref 3.5–5.1)
PROCALCITONIN SERPL IA-MCNC: 0.02 NG/ML
PROT SERPL-MCNC: 7.7 G/DL (ref 6–8.4)
RBC # BLD AUTO: 5.17 M/UL (ref 4–5.4)
RETICS/RBC NFR AUTO: 2 % (ref 0.5–2.5)
SODIUM SERPL-SCNC: 139 MMOL/L (ref 136–145)
TROPONIN I SERPL DL<=0.01 NG/ML-MCNC: <0.006 NG/ML (ref 0–0.03)
WBC # BLD AUTO: 6.93 K/UL (ref 3.9–12.7)

## 2019-07-30 PROCEDURE — 83615 LACTATE (LD) (LDH) ENZYME: CPT

## 2019-07-30 PROCEDURE — 85045 AUTOMATED RETICULOCYTE COUNT: CPT

## 2019-07-30 PROCEDURE — 93010 EKG 12-LEAD: ICD-10-PCS | Mod: ,,, | Performed by: INTERNAL MEDICINE

## 2019-07-30 PROCEDURE — 99223 PR INITIAL HOSPITAL CARE,LEVL III: ICD-10-PCS | Mod: ,,, | Performed by: HOSPITALIST

## 2019-07-30 PROCEDURE — 25000242 PHARM REV CODE 250 ALT 637 W/ HCPCS: Performed by: PHYSICIAN ASSISTANT

## 2019-07-30 PROCEDURE — 83605 ASSAY OF LACTIC ACID: CPT

## 2019-07-30 PROCEDURE — 85025 COMPLETE CBC W/AUTO DIFF WBC: CPT

## 2019-07-30 PROCEDURE — 96361 HYDRATE IV INFUSION ADD-ON: CPT

## 2019-07-30 PROCEDURE — 94761 N-INVAS EAR/PLS OXIMETRY MLT: CPT

## 2019-07-30 PROCEDURE — 11000001 HC ACUTE MED/SURG PRIVATE ROOM

## 2019-07-30 PROCEDURE — 99285 EMERGENCY DEPT VISIT HI MDM: CPT | Mod: ,,, | Performed by: EMERGENCY MEDICINE

## 2019-07-30 PROCEDURE — 96374 THER/PROPH/DIAG INJ IV PUSH: CPT

## 2019-07-30 PROCEDURE — 99223 1ST HOSP IP/OBS HIGH 75: CPT | Mod: ,,, | Performed by: HOSPITALIST

## 2019-07-30 PROCEDURE — 93010 ELECTROCARDIOGRAM REPORT: CPT | Mod: ,,, | Performed by: INTERNAL MEDICINE

## 2019-07-30 PROCEDURE — 27000221 HC OXYGEN, UP TO 24 HOURS

## 2019-07-30 PROCEDURE — 63600175 PHARM REV CODE 636 W HCPCS: Performed by: HOSPITALIST

## 2019-07-30 PROCEDURE — 94640 AIRWAY INHALATION TREATMENT: CPT

## 2019-07-30 PROCEDURE — 83880 ASSAY OF NATRIURETIC PEPTIDE: CPT

## 2019-07-30 PROCEDURE — 99285 PR EMERGENCY DEPT VISIT,LEVEL V: ICD-10-PCS | Mod: ,,, | Performed by: EMERGENCY MEDICINE

## 2019-07-30 PROCEDURE — 81025 URINE PREGNANCY TEST: CPT | Performed by: PHYSICIAN ASSISTANT

## 2019-07-30 PROCEDURE — 63600175 PHARM REV CODE 636 W HCPCS: Performed by: EMERGENCY MEDICINE

## 2019-07-30 PROCEDURE — 25000003 PHARM REV CODE 250: Performed by: STUDENT IN AN ORGANIZED HEALTH CARE EDUCATION/TRAINING PROGRAM

## 2019-07-30 PROCEDURE — 63600175 PHARM REV CODE 636 W HCPCS: Performed by: STUDENT IN AN ORGANIZED HEALTH CARE EDUCATION/TRAINING PROGRAM

## 2019-07-30 PROCEDURE — 84484 ASSAY OF TROPONIN QUANT: CPT

## 2019-07-30 PROCEDURE — 63600175 PHARM REV CODE 636 W HCPCS: Performed by: PHYSICIAN ASSISTANT

## 2019-07-30 PROCEDURE — 25000242 PHARM REV CODE 250 ALT 637 W/ HCPCS: Performed by: STUDENT IN AN ORGANIZED HEALTH CARE EDUCATION/TRAINING PROGRAM

## 2019-07-30 PROCEDURE — 80053 COMPREHEN METABOLIC PANEL: CPT

## 2019-07-30 PROCEDURE — 96372 THER/PROPH/DIAG INJ SC/IM: CPT

## 2019-07-30 PROCEDURE — 99285 EMERGENCY DEPT VISIT HI MDM: CPT

## 2019-07-30 PROCEDURE — 25000003 PHARM REV CODE 250: Performed by: PHYSICIAN ASSISTANT

## 2019-07-30 PROCEDURE — 93005 ELECTROCARDIOGRAM TRACING: CPT

## 2019-07-30 PROCEDURE — 84145 PROCALCITONIN (PCT): CPT

## 2019-07-30 RX ORDER — ONDANSETRON 8 MG/1
8 TABLET, ORALLY DISINTEGRATING ORAL EVERY 6 HOURS PRN
Status: DISCONTINUED | OUTPATIENT
Start: 2019-07-30 | End: 2019-08-04 | Stop reason: HOSPADM

## 2019-07-30 RX ORDER — HYDROCHLOROTHIAZIDE 25 MG/1
25 TABLET ORAL DAILY
Status: DISCONTINUED | OUTPATIENT
Start: 2019-07-31 | End: 2019-07-30

## 2019-07-30 RX ORDER — ACETAMINOPHEN 500 MG
1000 TABLET ORAL EVERY 6 HOURS PRN
Status: DISCONTINUED | OUTPATIENT
Start: 2019-07-30 | End: 2019-07-31

## 2019-07-30 RX ORDER — GLUCAGON 1 MG
1 KIT INJECTION
Status: DISCONTINUED | OUTPATIENT
Start: 2019-07-30 | End: 2019-08-04 | Stop reason: HOSPADM

## 2019-07-30 RX ORDER — ERGOCALCIFEROL 1.25 MG/1
50000 CAPSULE ORAL
Status: DISCONTINUED | OUTPATIENT
Start: 2019-07-31 | End: 2019-08-04 | Stop reason: HOSPADM

## 2019-07-30 RX ORDER — PREDNISONE 10 MG/1
40 TABLET ORAL DAILY
Status: COMPLETED | OUTPATIENT
Start: 2019-07-31 | End: 2019-08-04

## 2019-07-30 RX ORDER — CARVEDILOL 12.5 MG/1
12.5 TABLET ORAL 2 TIMES DAILY
Status: DISCONTINUED | OUTPATIENT
Start: 2019-07-30 | End: 2019-07-31

## 2019-07-30 RX ORDER — FLUTICASONE FUROATE AND VILANTEROL 100; 25 UG/1; UG/1
1 POWDER RESPIRATORY (INHALATION) DAILY
Status: DISCONTINUED | OUTPATIENT
Start: 2019-07-31 | End: 2019-07-30

## 2019-07-30 RX ORDER — ASCORBIC ACID 500 MG
500 TABLET ORAL DAILY
Status: DISCONTINUED | OUTPATIENT
Start: 2019-07-31 | End: 2019-08-04 | Stop reason: HOSPADM

## 2019-07-30 RX ORDER — IBUPROFEN 200 MG
16 TABLET ORAL
Status: DISCONTINUED | OUTPATIENT
Start: 2019-07-30 | End: 2019-08-04 | Stop reason: HOSPADM

## 2019-07-30 RX ORDER — CALCIUM CARBONATE 200(500)MG
1 TABLET,CHEWABLE ORAL DAILY PRN
Status: DISCONTINUED | OUTPATIENT
Start: 2019-07-30 | End: 2019-08-04 | Stop reason: HOSPADM

## 2019-07-30 RX ORDER — DIPHENHYDRAMINE HYDROCHLORIDE 50 MG/ML
25 INJECTION INTRAMUSCULAR; INTRAVENOUS
Status: COMPLETED | OUTPATIENT
Start: 2019-07-30 | End: 2019-07-30

## 2019-07-30 RX ORDER — ENOXAPARIN SODIUM 100 MG/ML
40 INJECTION SUBCUTANEOUS EVERY 24 HOURS
Status: DISCONTINUED | OUTPATIENT
Start: 2019-07-30 | End: 2019-07-30

## 2019-07-30 RX ORDER — KETOROLAC TROMETHAMINE 30 MG/ML
10 INJECTION, SOLUTION INTRAMUSCULAR; INTRAVENOUS
Status: COMPLETED | OUTPATIENT
Start: 2019-07-30 | End: 2019-07-30

## 2019-07-30 RX ORDER — IPRATROPIUM BROMIDE AND ALBUTEROL SULFATE 2.5; .5 MG/3ML; MG/3ML
3 SOLUTION RESPIRATORY (INHALATION) EVERY 6 HOURS
Status: DISCONTINUED | OUTPATIENT
Start: 2019-07-30 | End: 2019-07-31

## 2019-07-30 RX ORDER — POLYETHYLENE GLYCOL 3350 17 G/17G
17 POWDER, FOR SOLUTION ORAL DAILY
Status: DISCONTINUED | OUTPATIENT
Start: 2019-07-31 | End: 2019-08-04 | Stop reason: HOSPADM

## 2019-07-30 RX ORDER — OXYCODONE AND ACETAMINOPHEN 10; 325 MG/1; MG/1
1 TABLET ORAL EVERY 4 HOURS PRN
Status: DISCONTINUED | OUTPATIENT
Start: 2019-07-30 | End: 2019-07-31

## 2019-07-30 RX ORDER — AMOXICILLIN 250 MG
1 CAPSULE ORAL DAILY
Status: DISCONTINUED | OUTPATIENT
Start: 2019-07-31 | End: 2019-08-04 | Stop reason: HOSPADM

## 2019-07-30 RX ORDER — SODIUM CHLORIDE 9 MG/ML
INJECTION, SOLUTION INTRAVENOUS CONTINUOUS
Status: DISCONTINUED | OUTPATIENT
Start: 2019-07-30 | End: 2019-07-30

## 2019-07-30 RX ORDER — FLUTICASONE PROPIONATE 50 MCG
2 SPRAY, SUSPENSION (ML) NASAL DAILY
Status: DISCONTINUED | OUTPATIENT
Start: 2019-07-31 | End: 2019-08-04 | Stop reason: HOSPADM

## 2019-07-30 RX ORDER — METHYLPREDNISOLONE SOD SUCC 125 MG
125 VIAL (EA) INJECTION
Status: DISCONTINUED | OUTPATIENT
Start: 2019-07-30 | End: 2019-07-30

## 2019-07-30 RX ORDER — FLUOXETINE HYDROCHLORIDE 20 MG/1
40 CAPSULE ORAL DAILY
Status: DISCONTINUED | OUTPATIENT
Start: 2019-07-31 | End: 2019-08-04 | Stop reason: HOSPADM

## 2019-07-30 RX ORDER — HYDROMORPHONE HYDROCHLORIDE 1 MG/ML
0.5 INJECTION, SOLUTION INTRAMUSCULAR; INTRAVENOUS; SUBCUTANEOUS EVERY 4 HOURS PRN
Status: DISCONTINUED | OUTPATIENT
Start: 2019-07-30 | End: 2019-07-31

## 2019-07-30 RX ORDER — CARBAMAZEPINE 200 MG/1
800 TABLET ORAL 2 TIMES DAILY
Status: DISCONTINUED | OUTPATIENT
Start: 2019-07-30 | End: 2019-08-04 | Stop reason: HOSPADM

## 2019-07-30 RX ORDER — KETOROLAC TROMETHAMINE 30 MG/ML
10 INJECTION, SOLUTION INTRAMUSCULAR; INTRAVENOUS
Status: DISCONTINUED | OUTPATIENT
Start: 2019-07-30 | End: 2019-07-30

## 2019-07-30 RX ORDER — HYDROMORPHONE HYDROCHLORIDE 1 MG/ML
2 INJECTION, SOLUTION INTRAMUSCULAR; INTRAVENOUS; SUBCUTANEOUS
Status: COMPLETED | OUTPATIENT
Start: 2019-07-30 | End: 2019-07-30

## 2019-07-30 RX ORDER — ALBUTEROL SULFATE 90 UG/1
2 AEROSOL, METERED RESPIRATORY (INHALATION) EVERY 6 HOURS PRN
Status: DISCONTINUED | OUTPATIENT
Start: 2019-07-30 | End: 2019-08-03

## 2019-07-30 RX ORDER — GABAPENTIN 100 MG/1
300 CAPSULE ORAL 2 TIMES DAILY
Status: DISCONTINUED | OUTPATIENT
Start: 2019-07-30 | End: 2019-08-04 | Stop reason: HOSPADM

## 2019-07-30 RX ORDER — FLUTICASONE FUROATE AND VILANTEROL 100; 25 UG/1; UG/1
1 POWDER RESPIRATORY (INHALATION) DAILY
Status: DISCONTINUED | OUTPATIENT
Start: 2019-07-31 | End: 2019-08-04 | Stop reason: HOSPADM

## 2019-07-30 RX ORDER — IBUPROFEN 200 MG
24 TABLET ORAL
Status: DISCONTINUED | OUTPATIENT
Start: 2019-07-30 | End: 2019-08-04 | Stop reason: HOSPADM

## 2019-07-30 RX ORDER — SODIUM CHLORIDE 0.9 % (FLUSH) 0.9 %
10 SYRINGE (ML) INJECTION
Status: DISCONTINUED | OUTPATIENT
Start: 2019-07-30 | End: 2019-08-04 | Stop reason: HOSPADM

## 2019-07-30 RX ORDER — ENOXAPARIN SODIUM 100 MG/ML
40 INJECTION SUBCUTANEOUS EVERY 12 HOURS
Status: DISCONTINUED | OUTPATIENT
Start: 2019-07-30 | End: 2019-08-04 | Stop reason: HOSPADM

## 2019-07-30 RX ORDER — AMLODIPINE BESYLATE 10 MG/1
10 TABLET ORAL DAILY
Status: DISCONTINUED | OUTPATIENT
Start: 2019-07-31 | End: 2019-08-04 | Stop reason: HOSPADM

## 2019-07-30 RX ORDER — MORPHINE SULFATE 4 MG/ML
8 INJECTION, SOLUTION INTRAMUSCULAR; INTRAVENOUS
Status: COMPLETED | OUTPATIENT
Start: 2019-07-30 | End: 2019-07-30

## 2019-07-30 RX ORDER — ACETAMINOPHEN 500 MG
1000 TABLET ORAL
Status: COMPLETED | OUTPATIENT
Start: 2019-07-30 | End: 2019-07-30

## 2019-07-30 RX ORDER — LEVOFLOXACIN 750 MG/1
750 TABLET ORAL DAILY
Status: DISCONTINUED | OUTPATIENT
Start: 2019-07-31 | End: 2019-07-31

## 2019-07-30 RX ORDER — DIPHENHYDRAMINE HCL 25 MG
25 CAPSULE ORAL
Status: COMPLETED | OUTPATIENT
Start: 2019-07-30 | End: 2019-07-30

## 2019-07-30 RX ORDER — IPRATROPIUM BROMIDE AND ALBUTEROL SULFATE 2.5; .5 MG/3ML; MG/3ML
3 SOLUTION RESPIRATORY (INHALATION)
Status: COMPLETED | OUTPATIENT
Start: 2019-07-30 | End: 2019-07-30

## 2019-07-30 RX ORDER — DIPHENHYDRAMINE HCL 25 MG
25 CAPSULE ORAL EVERY 6 HOURS PRN
Status: DISCONTINUED | OUTPATIENT
Start: 2019-07-30 | End: 2019-08-04 | Stop reason: HOSPADM

## 2019-07-30 RX ADMIN — ENOXAPARIN SODIUM 40 MG: 100 INJECTION SUBCUTANEOUS at 09:07

## 2019-07-30 RX ADMIN — DIPHENHYDRAMINE HYDROCHLORIDE 25 MG: 50 INJECTION, SOLUTION INTRAMUSCULAR; INTRAVENOUS at 05:07

## 2019-07-30 RX ADMIN — KETOROLAC TROMETHAMINE 10 MG: 30 INJECTION, SOLUTION INTRAMUSCULAR at 04:07

## 2019-07-30 RX ADMIN — DIPHENHYDRAMINE HYDROCHLORIDE 25 MG: 25 CAPSULE ORAL at 07:07

## 2019-07-30 RX ADMIN — CARVEDILOL 12.5 MG: 12.5 TABLET, FILM COATED ORAL at 09:07

## 2019-07-30 RX ADMIN — CARBAMAZEPINE 800 MG: 200 TABLET ORAL at 09:07

## 2019-07-30 RX ADMIN — GABAPENTIN 300 MG: 100 CAPSULE ORAL at 09:07

## 2019-07-30 RX ADMIN — IPRATROPIUM BROMIDE AND ALBUTEROL SULFATE 3 ML: .5; 3 SOLUTION RESPIRATORY (INHALATION) at 03:07

## 2019-07-30 RX ADMIN — ACETAMINOPHEN 1000 MG: 500 TABLET ORAL at 04:07

## 2019-07-30 RX ADMIN — IPRATROPIUM BROMIDE AND ALBUTEROL SULFATE 3 ML: .5; 3 SOLUTION RESPIRATORY (INHALATION) at 08:07

## 2019-07-30 RX ADMIN — DIPHENHYDRAMINE HYDROCHLORIDE 25 MG: 25 CAPSULE ORAL at 03:07

## 2019-07-30 RX ADMIN — SODIUM CHLORIDE 1000 ML: 0.9 INJECTION, SOLUTION INTRAVENOUS at 03:07

## 2019-07-30 RX ADMIN — HYDROMORPHONE HYDROCHLORIDE 2 MG: 1 INJECTION, SOLUTION INTRAMUSCULAR; INTRAVENOUS; SUBCUTANEOUS at 05:07

## 2019-07-30 RX ADMIN — ALBUTEROL SULFATE 2 PUFF: 90 AEROSOL, METERED RESPIRATORY (INHALATION) at 11:07

## 2019-07-30 RX ADMIN — HYDROMORPHONE HYDROCHLORIDE 0.5 MG: 1 INJECTION, SOLUTION INTRAMUSCULAR; INTRAVENOUS; SUBCUTANEOUS at 09:07

## 2019-07-30 RX ADMIN — MORPHINE SULFATE 8 MG: 4 INJECTION INTRAVENOUS at 03:07

## 2019-07-30 NOTE — ED NOTES
Pt complaints of itching all over due to hydromorphone reaction. Physicians aware of stated put in a verbal order 25mg benadryl IV

## 2019-07-30 NOTE — ED PROVIDER NOTES
"Encounter Date: 2019       History     Chief Complaint   Patient presents with    Shortness of Breath     Reports of cough and congestion and leg pain    Sickle Cell Pain Crisis     39-year-old female with sickle cell disease, beta thalassemia, hypertension and asthma presents for shortness of breath for 2 days.  Patient reports that she saw her young niece this weekend who had URI symptoms, she became ill shortly thereafter.  She endorses cough productive of greenish sputum, sinus congestion, wheezing and shortness of breath that is worse with exertion and orthopnea.  Minimal improvement with home nebulizers.  She is also endorsing midsternal chest "heaviness" since last night and pain in bilateral lower legs consistent with prior vaso-occlusive crises.  She took a Percocet at home this morning without relief.  She has had chills but is unsure about fevers due to broken thermometer.        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    Asthma     Depression     Hypertension     Morbid obesity     Opioid dependence 2017    Pneumonia due to Streptococcus pneumoniae 2017    Right lower lobe pneumonia 2017    Sepsis due to Streptococcus pneumoniae 2017    Sickle cell-beta thalassemia disease with pain     Trigeminal neuralgia      Past Surgical History:   Procedure Laterality Date     SECTION      TONSILLECTOMY      TUBAL LIGATION       Family History   Problem Relation Age of Onset    Heart disease Mother     Diabetes Mother     Heart disease Father     Breast cancer Neg Hx     Ovarian cancer Neg Hx     Colon cancer Neg Hx      Social History     Tobacco Use    Smoking status: Never Smoker    Smokeless tobacco: Never Used   Substance Use Topics    Alcohol use: No    Drug use: No     Review of " Systems   Constitutional: Positive for chills and fatigue. Negative for diaphoresis and fever.   HENT: Positive for congestion, sinus pressure and sinus pain. Negative for sore throat and trouble swallowing.    Respiratory: Positive for cough, chest tightness, shortness of breath and wheezing.    Cardiovascular: Positive for chest pain. Negative for palpitations and leg swelling.   Gastrointestinal: Negative for abdominal pain, constipation, diarrhea, nausea and vomiting.   Endocrine: Negative for polydipsia and polyuria.   Genitourinary: Negative for dysuria, flank pain, frequency, hematuria and urgency.   Musculoskeletal: Positive for myalgias. Negative for back pain and gait problem.   Skin: Negative for color change, rash and wound.   Neurological: Positive for headaches. Negative for weakness, light-headedness and numbness.       Physical Exam     Initial Vitals [07/30/19 1406]   BP Pulse Resp Temp SpO2   (!) 177/98 97 15 98.9 °F (37.2 °C) 96 %      MAP       --         Physical Exam    Vitals reviewed.  Constitutional: She appears well-developed and well-nourished. She is not diaphoretic.  Non-toxic appearance. No distress.   HENT:   Head: Normocephalic and atraumatic.   Nose: Right sinus exhibits maxillary sinus tenderness. Right sinus exhibits no frontal sinus tenderness. Left sinus exhibits maxillary sinus tenderness. Left sinus exhibits no frontal sinus tenderness.   Mouth/Throat: Uvula is midline and oropharynx is clear and moist.   Eyes: EOM are normal. Pupils are equal, round, and reactive to light.   Neck: Normal range of motion. Neck supple.   Cardiovascular: Normal rate, regular rhythm, normal heart sounds and intact distal pulses. Exam reveals no gallop and no friction rub.    No murmur heard.  No lower extremity edema, erythema, tenderness or warmth   Pulmonary/Chest: No respiratory distress. She has wheezes. She has no rhonchi. She has no rales. She exhibits no tenderness.   Diffuse, loud  expiratory wheezing, worse in the upper chest   Abdominal: Soft. Bowel sounds are normal. She exhibits no distension. There is no tenderness.   Musculoskeletal: Normal range of motion.   Neurological: She is alert and oriented to person, place, and time.   Skin: Skin is warm and dry.   Psychiatric: She has a normal mood and affect.         ED Course   Procedures  Labs Reviewed   CBC W/ AUTO DIFFERENTIAL - Abnormal; Notable for the following components:       Result Value    Hemoglobin 11.0 (*)     Hematocrit 34.6 (*)     Mean Corpuscular Volume 67 (*)     Mean Corpuscular Hemoglobin 21.3 (*)     Mean Corpuscular Hemoglobin Conc 31.8 (*)     RDW 18.4 (*)     All other components within normal limits   B-TYPE NATRIURETIC PEPTIDE   TROPONIN I   RETICULOCYTES   LACTIC ACID, PLASMA   PROCALCITONIN   COMPREHENSIVE METABOLIC PANEL   POCT URINE PREGNANCY     EKG Readings: (Independently Interpreted)   Initial Reading: No STEMI. Previous EKG: Compared with most recent EKG Previous EKG Date: 4/30/2019. Rhythm: Normal Sinus Rhythm. Heart Rate: 100. Ectopy: No Ectopy. Conduction: Normal. ST Segments: Normal ST Segments. T Waves: Normal. Clinical Impression: Normal Sinus Rhythm     ECG Results          EKG 12-lead (In process)  Result time 07/30/19 14:28:55    In process by Interface, Lab In [facility] (07/30/19 14:28:55)                 Narrative:    Test Reason : R06.02,    Vent. Rate : 100 BPM     Atrial Rate : 100 BPM     P-R Int : 160 ms          QRS Dur : 096 ms      QT Int : 366 ms       P-R-T Axes : 065 050 052 degrees     QTc Int : 472 ms    Normal sinus rhythm  Normal ECG  When compared with ECG of 30-APR-2019 07:08,  No significant change was found    Referred By:             Confirmed By:                             Imaging Results          X-Ray Chest PA And Lateral (Final result)  Result time 07/30/19 16:18:04    Final result by Stella Denney MD (07/30/19 16:18:04)                 Impression:      Slight  interstitial edema.      Electronically signed by: Stella Denney  Date:    07/30/2019  Time:    16:18             Narrative:    EXAMINATION:  XR CHEST PA AND LATERAL    CLINICAL HISTORY:  Shortness of breath    TECHNIQUE:  PA and lateral views of the chest were performed.    COMPARISON:  04/27/2019 chest    FINDINGS:  Frontal and lateral views presented.    The heart is normal size on this modest inspiratory exam.  There is slight interstitial edema despite small inspiratory exam.  No pneumothorax or pleural effusion or consolidation or nodule.  No cavitary lesion.  Trachea appears normal.  Visualized spine and bowel gas pattern appears normal.                                 Medical Decision Making:   History:   Old Medical Records: I decided to obtain old medical records.  Clinical Tests:   Lab Tests: Ordered and Reviewed  Radiological Study: Ordered and Reviewed  Medical Tests: Ordered and Reviewed       APC / Resident Notes:   39-year-old female presenting for shortness of breath, wheezing and productive cough as well as bilateral leg pain consistent with prior vaso-occlusive crises.  On ED arrival, she is hypertensive and tachypneic but satting well on room air and afebrile.  Lungs with loud expiratory wheezes and decreased air movement.  No lower extremity edema, erythema, tenderness or warmth.  Differential diagnosis includes pneumonia, acute chest syndrome, asthma exacerbation, bronchitis, vaso-occlusive crisis.  Lower suspicion for ACS or CHF. Will check labs, do chest x-ray, give nebulizer treatments, morphine and reassess.    Patient reports some improvement of pain after morphine and some improvement of shortness of breath after nebs.  Lung sounds improved, however still with decreased air movement.  Labs notable for hemoglobin of 11 with reticulocyte count of 2.0.  No leukocytosis, procalcitonin normal, lactic acid normal. Chest x-ray shows slight interstitial edema however similar compared to most  recent chest x-ray during her previous admission.  Per chart review, the patient was admitted several months ago for 9 days for acute respiratory failure and acute chest syndrome.  Given her continued shortness of breath and abnormal chest x-ray, do not feel that she is safe for discharge at this time.  Will admit to Hospital Medicine for further management.  Patient comfortable with admission.  I discussed this patient with my supervising physician.    Janie Oakes PA-C                ED Course as of Jul 30 1722   Tue Jul 30, 2019   1640 X-Ray Chest PA And Lateral [CC]   1642 X-Ray Chest PA And Lateral [CC]      ED Course User Index  [CC] Janie Oakes PA-C     Clinical Impression:       ICD-10-CM ICD-9-CM   1. Sickle cell pain crisis D57.00 282.62   2. Shortness of breath R06.02 786.05   3. Asthma with acute exacerbation, unspecified asthma severity, unspecified whether persistent J45.901 493.92         Disposition:   Disposition: Admitted  Condition: Fair                        Janie Oakes PA-C  07/30/19 1728

## 2019-07-30 NOTE — ED NOTES
Pt advised and offered to go to consult room for IM injection of ketorolac. Pt refused and did not want to get out of recliner due to pain and being tired

## 2019-07-30 NOTE — ED NOTES
"Patient identifiers verified and correct for Ms Kira  C/C: SOB, leg pain SEE NN  APPEARANCE: awake and alert in NAD.  SKIN: warm, dry and intact. No breakdown or bruising.  MUSCULOSKELETAL: Patient moving all extremities spontaneously, no obvious swelling or deformities noted. Ambulates independently.  RESPIRATORY: Positive shortness of breath.Respirations unlabored. Positive cough with yellow secretions, unknown fever, audible wheezes, breath sounds decr  CARDIAC: Denies CP, 2+ distal pulses; no peripheral edema, states chest "heaviness'   ABDOMEN: S/ND/NT, Denies nausea  : voids spontaneously, denies difficulty  Neurologic: AAO x 4; follows commands equal strength in all extremities; denies numbness/tingling. Denies dizziness Denies weakness, pain to right leg    "

## 2019-07-30 NOTE — TELEPHONE ENCOUNTER
----- Message from Estrella Alvarado sent at 7/30/2019 12:23 PM CDT -----  Contact: Self  Pt is calling to speak with Staff because she says she is in pain and wants to go to the Pain Center for an IV.   offered to forward the call to the On-Call Triage Nurse, but the pt declined.    She can be reached at [phone].    Thank you.

## 2019-07-30 NOTE — ED TRIAGE NOTES
Patient states SOB onset Sunday, worse with ambulation, positive cough with green secretions, unknown fevers, positive chills. Also states pain in legs that may be beginning a sickle cell pain crisis. Last Percocet at 0800, last neb treatment at 1200

## 2019-07-30 NOTE — TELEPHONE ENCOUNTER
Called pt and informed that she is on a waiting list for fluids and pain medicine. Pt might go to ER for treatment.

## 2019-07-31 PROBLEM — J45.901 MODERATE ASTHMA WITH EXACERBATION: Status: ACTIVE | Noted: 2017-04-25

## 2019-07-31 LAB
ALBUMIN SERPL BCP-MCNC: 3.2 G/DL (ref 3.5–5.2)
ALP SERPL-CCNC: 88 U/L (ref 55–135)
ALT SERPL W/O P-5'-P-CCNC: 12 U/L (ref 10–44)
ANION GAP SERPL CALC-SCNC: 9 MMOL/L (ref 8–16)
AST SERPL-CCNC: 18 U/L (ref 10–40)
BASOPHILS # BLD AUTO: 0.03 K/UL (ref 0–0.2)
BASOPHILS NFR BLD: 0.5 % (ref 0–1.9)
BILIRUB SERPL-MCNC: 0.3 MG/DL (ref 0.1–1)
BUN SERPL-MCNC: 9 MG/DL (ref 6–20)
CALCIUM SERPL-MCNC: 9.1 MG/DL (ref 8.7–10.5)
CHLORIDE SERPL-SCNC: 106 MMOL/L (ref 95–110)
CO2 SERPL-SCNC: 25 MMOL/L (ref 23–29)
CREAT SERPL-MCNC: 0.8 MG/DL (ref 0.5–1.4)
DIFFERENTIAL METHOD: ABNORMAL
EOSINOPHIL # BLD AUTO: 0.2 K/UL (ref 0–0.5)
EOSINOPHIL NFR BLD: 2.6 % (ref 0–8)
ERYTHROCYTE [DISTWIDTH] IN BLOOD BY AUTOMATED COUNT: 18.2 % (ref 11.5–14.5)
EST. GFR  (AFRICAN AMERICAN): >60 ML/MIN/1.73 M^2
EST. GFR  (NON AFRICAN AMERICAN): >60 ML/MIN/1.73 M^2
GLUCOSE SERPL-MCNC: 114 MG/DL (ref 70–110)
HCT VFR BLD AUTO: 31.9 % (ref 37–48.5)
HGB BLD-MCNC: 10.3 G/DL (ref 12–16)
IMM GRANULOCYTES # BLD AUTO: 0.01 K/UL (ref 0–0.04)
IMM GRANULOCYTES NFR BLD AUTO: 0.2 % (ref 0–0.5)
LDH SERPL L TO P-CCNC: 295 U/L (ref 110–260)
LYMPHOCYTES # BLD AUTO: 1.6 K/UL (ref 1–4.8)
LYMPHOCYTES NFR BLD: 27.6 % (ref 18–48)
MAGNESIUM SERPL-MCNC: 2 MG/DL (ref 1.6–2.6)
MAGNESIUM SERPL-MCNC: 2 MG/DL (ref 1.6–2.6)
MCH RBC QN AUTO: 21.5 PG (ref 27–31)
MCHC RBC AUTO-ENTMCNC: 32.3 G/DL (ref 32–36)
MCV RBC AUTO: 67 FL (ref 82–98)
MONOCYTES # BLD AUTO: 0.7 K/UL (ref 0.3–1)
MONOCYTES NFR BLD: 11.4 % (ref 4–15)
NEUTROPHILS # BLD AUTO: 3.4 K/UL (ref 1.8–7.7)
NEUTROPHILS NFR BLD: 57.7 % (ref 38–73)
NRBC BLD-RTO: 0 /100 WBC
PHOSPHATE SERPL-MCNC: 3.8 MG/DL (ref 2.7–4.5)
PLATELET # BLD AUTO: 221 K/UL (ref 150–350)
PMV BLD AUTO: 10 FL (ref 9.2–12.9)
POTASSIUM SERPL-SCNC: 4 MMOL/L (ref 3.5–5.1)
PROT SERPL-MCNC: 7.2 G/DL (ref 6–8.4)
RBC # BLD AUTO: 4.79 M/UL (ref 4–5.4)
SODIUM SERPL-SCNC: 140 MMOL/L (ref 136–145)
WBC # BLD AUTO: 5.86 K/UL (ref 3.9–12.7)

## 2019-07-31 PROCEDURE — 25000003 PHARM REV CODE 250: Performed by: STUDENT IN AN ORGANIZED HEALTH CARE EDUCATION/TRAINING PROGRAM

## 2019-07-31 PROCEDURE — 36415 COLL VENOUS BLD VENIPUNCTURE: CPT

## 2019-07-31 PROCEDURE — 83735 ASSAY OF MAGNESIUM: CPT | Mod: 91

## 2019-07-31 PROCEDURE — 94799 UNLISTED PULMONARY SVC/PX: CPT

## 2019-07-31 PROCEDURE — 85025 COMPLETE CBC W/AUTO DIFF WBC: CPT

## 2019-07-31 PROCEDURE — 25000242 PHARM REV CODE 250 ALT 637 W/ HCPCS: Performed by: STUDENT IN AN ORGANIZED HEALTH CARE EDUCATION/TRAINING PROGRAM

## 2019-07-31 PROCEDURE — 63600175 PHARM REV CODE 636 W HCPCS: Performed by: STUDENT IN AN ORGANIZED HEALTH CARE EDUCATION/TRAINING PROGRAM

## 2019-07-31 PROCEDURE — 84100 ASSAY OF PHOSPHORUS: CPT

## 2019-07-31 PROCEDURE — 83735 ASSAY OF MAGNESIUM: CPT

## 2019-07-31 PROCEDURE — 80053 COMPREHEN METABOLIC PANEL: CPT

## 2019-07-31 PROCEDURE — 94761 N-INVAS EAR/PLS OXIMETRY MLT: CPT

## 2019-07-31 PROCEDURE — 97161 PT EVAL LOW COMPLEX 20 MIN: CPT

## 2019-07-31 PROCEDURE — 97165 OT EVAL LOW COMPLEX 30 MIN: CPT

## 2019-07-31 PROCEDURE — 11000001 HC ACUTE MED/SURG PRIVATE ROOM

## 2019-07-31 PROCEDURE — 99900035 HC TECH TIME PER 15 MIN (STAT)

## 2019-07-31 PROCEDURE — 94640 AIRWAY INHALATION TREATMENT: CPT

## 2019-07-31 PROCEDURE — 27000221 HC OXYGEN, UP TO 24 HOURS

## 2019-07-31 RX ORDER — ACETAMINOPHEN 500 MG
1000 TABLET ORAL EVERY 8 HOURS
Status: DISCONTINUED | OUTPATIENT
Start: 2019-07-31 | End: 2019-08-03

## 2019-07-31 RX ORDER — HYDROMORPHONE HYDROCHLORIDE 1 MG/ML
1 INJECTION, SOLUTION INTRAMUSCULAR; INTRAVENOUS; SUBCUTANEOUS
Status: DISCONTINUED | OUTPATIENT
Start: 2019-07-31 | End: 2019-08-02

## 2019-07-31 RX ORDER — IPRATROPIUM BROMIDE AND ALBUTEROL SULFATE 2.5; .5 MG/3ML; MG/3ML
3 SOLUTION RESPIRATORY (INHALATION) EVERY 4 HOURS
Status: DISCONTINUED | OUTPATIENT
Start: 2019-07-31 | End: 2019-07-31

## 2019-07-31 RX ORDER — OXYCODONE HYDROCHLORIDE 10 MG/1
10 TABLET ORAL EVERY 4 HOURS PRN
Status: DISCONTINUED | OUTPATIENT
Start: 2019-07-31 | End: 2019-07-31

## 2019-07-31 RX ORDER — CARVEDILOL 12.5 MG/1
12.5 TABLET ORAL 2 TIMES DAILY WITH MEALS
Status: DISCONTINUED | OUTPATIENT
Start: 2019-07-31 | End: 2019-08-03

## 2019-07-31 RX ORDER — HYDROMORPHONE HYDROCHLORIDE 1 MG/ML
1 INJECTION, SOLUTION INTRAMUSCULAR; INTRAVENOUS; SUBCUTANEOUS
Status: DISCONTINUED | OUTPATIENT
Start: 2019-07-31 | End: 2019-07-31

## 2019-07-31 RX ORDER — AZITHROMYCIN 250 MG/1
500 TABLET, FILM COATED ORAL DAILY
Status: COMPLETED | OUTPATIENT
Start: 2019-08-01 | End: 2019-08-04

## 2019-07-31 RX ORDER — IPRATROPIUM BROMIDE AND ALBUTEROL SULFATE 2.5; .5 MG/3ML; MG/3ML
3 SOLUTION RESPIRATORY (INHALATION) EVERY 6 HOURS
Status: DISCONTINUED | OUTPATIENT
Start: 2019-08-01 | End: 2019-07-31

## 2019-07-31 RX ORDER — IPRATROPIUM BROMIDE AND ALBUTEROL SULFATE 2.5; .5 MG/3ML; MG/3ML
3 SOLUTION RESPIRATORY (INHALATION) EVERY 4 HOURS
Status: DISCONTINUED | OUTPATIENT
Start: 2019-07-31 | End: 2019-08-03

## 2019-07-31 RX ADMIN — DIPHENHYDRAMINE HYDROCHLORIDE 25 MG: 25 CAPSULE ORAL at 07:07

## 2019-07-31 RX ADMIN — HYDROMORPHONE HYDROCHLORIDE 0.5 MG: 1 INJECTION, SOLUTION INTRAMUSCULAR; INTRAVENOUS; SUBCUTANEOUS at 10:07

## 2019-07-31 RX ADMIN — HYDROMORPHONE HYDROCHLORIDE 0.5 MG: 1 INJECTION, SOLUTION INTRAMUSCULAR; INTRAVENOUS; SUBCUTANEOUS at 05:07

## 2019-07-31 RX ADMIN — ACETAMINOPHEN 1000 MG: 500 TABLET ORAL at 02:07

## 2019-07-31 RX ADMIN — SENNOSIDES,DOCUSATE SODIUM 1 TABLET: 8.6; 5 TABLET, FILM COATED ORAL at 10:07

## 2019-07-31 RX ADMIN — IPRATROPIUM BROMIDE AND ALBUTEROL SULFATE 3 ML: .5; 3 SOLUTION RESPIRATORY (INHALATION) at 08:07

## 2019-07-31 RX ADMIN — ENOXAPARIN SODIUM 40 MG: 100 INJECTION SUBCUTANEOUS at 10:07

## 2019-07-31 RX ADMIN — CARVEDILOL 12.5 MG: 12.5 TABLET, FILM COATED ORAL at 05:07

## 2019-07-31 RX ADMIN — DIPHENHYDRAMINE HYDROCHLORIDE 25 MG: 25 CAPSULE ORAL at 05:07

## 2019-07-31 RX ADMIN — PREDNISONE 40 MG: 20 TABLET ORAL at 10:07

## 2019-07-31 RX ADMIN — CARBAMAZEPINE 800 MG: 200 TABLET ORAL at 10:07

## 2019-07-31 RX ADMIN — DIPHENHYDRAMINE HYDROCHLORIDE 25 MG: 25 CAPSULE ORAL at 01:07

## 2019-07-31 RX ADMIN — ONDANSETRON 8 MG: 8 TABLET, ORALLY DISINTEGRATING ORAL at 10:07

## 2019-07-31 RX ADMIN — HYDROMORPHONE HYDROCHLORIDE 1 MG: 1 INJECTION, SOLUTION INTRAMUSCULAR; INTRAVENOUS; SUBCUTANEOUS at 11:07

## 2019-07-31 RX ADMIN — HYDROMORPHONE HYDROCHLORIDE 0.5 MG: 1 INJECTION, SOLUTION INTRAMUSCULAR; INTRAVENOUS; SUBCUTANEOUS at 01:07

## 2019-07-31 RX ADMIN — GABAPENTIN 300 MG: 100 CAPSULE ORAL at 10:07

## 2019-07-31 RX ADMIN — ENOXAPARIN SODIUM 40 MG: 100 INJECTION SUBCUTANEOUS at 09:07

## 2019-07-31 RX ADMIN — AMLODIPINE BESYLATE 10 MG: 10 TABLET ORAL at 10:07

## 2019-07-31 RX ADMIN — IPRATROPIUM BROMIDE AND ALBUTEROL SULFATE 3 ML: .5; 3 SOLUTION RESPIRATORY (INHALATION) at 04:07

## 2019-07-31 RX ADMIN — ACETAMINOPHEN 1000 MG: 500 TABLET ORAL at 09:07

## 2019-07-31 RX ADMIN — LEVOFLOXACIN 750 MG: 750 TABLET, FILM COATED ORAL at 10:07

## 2019-07-31 RX ADMIN — OXYCODONE HYDROCHLORIDE 15 MG: 10 TABLET ORAL at 09:07

## 2019-07-31 RX ADMIN — GABAPENTIN 300 MG: 100 CAPSULE ORAL at 09:07

## 2019-07-31 RX ADMIN — Medication 500 MG: at 10:07

## 2019-07-31 RX ADMIN — FLUOXETINE 40 MG: 20 CAPSULE ORAL at 10:07

## 2019-07-31 RX ADMIN — IPRATROPIUM BROMIDE AND ALBUTEROL SULFATE 3 ML: .5; 3 SOLUTION RESPIRATORY (INHALATION) at 12:07

## 2019-07-31 RX ADMIN — OXYCODONE HYDROCHLORIDE 15 MG: 10 TABLET ORAL at 05:07

## 2019-07-31 RX ADMIN — HYDROMORPHONE HYDROCHLORIDE 1 MG: 1 INJECTION, SOLUTION INTRAMUSCULAR; INTRAVENOUS; SUBCUTANEOUS at 07:07

## 2019-07-31 RX ADMIN — ALBUTEROL SULFATE 2 PUFF: 90 AEROSOL, METERED RESPIRATORY (INHALATION) at 10:07

## 2019-07-31 RX ADMIN — CARVEDILOL 12.5 MG: 12.5 TABLET, FILM COATED ORAL at 10:07

## 2019-07-31 RX ADMIN — FLUTICASONE PROPIONATE 100 MCG: 50 SPRAY, METERED NASAL at 10:07

## 2019-07-31 RX ADMIN — FLUTICASONE FUROATE AND VILANTEROL TRIFENATATE 1 PUFF: 100; 25 POWDER RESPIRATORY (INHALATION) at 10:07

## 2019-07-31 RX ADMIN — OXYCODONE HYDROCHLORIDE AND ACETAMINOPHEN 1 TABLET: 10; 325 TABLET ORAL at 12:07

## 2019-07-31 RX ADMIN — CARBAMAZEPINE 800 MG: 200 TABLET ORAL at 09:07

## 2019-07-31 RX ADMIN — ERGOCALCIFEROL 50000 UNITS: 1.25 CAPSULE ORAL at 10:07

## 2019-07-31 RX ADMIN — OXYCODONE HYDROCHLORIDE 15 MG: 10 TABLET ORAL at 12:07

## 2019-07-31 RX ADMIN — HYDROMORPHONE HYDROCHLORIDE 1 MG: 1 INJECTION, SOLUTION INTRAMUSCULAR; INTRAVENOUS; SUBCUTANEOUS at 01:07

## 2019-07-31 NOTE — HOSPITAL COURSE
"Ms. Malone was admitted to hospital medicine for SOB associated with left arm and bilateral leg pain on 7/30. On 7/31, she received her first dose of 5-day course of azithromycin and prednisone for an asthma exacerbation possibly associated with pneumonia. On 8/1, patient's echo was unremarkable for heart failure or pulmonary hypertension. She was still complaining of SOB, but her pain was improving. On 8/2, patient was satting in the 90s on room air, but continued to endorse pain. IV dilaudid PRN was ordered. On 8/3, patient denied SOB but complaining of 10/10 diffuse left leg pain that feels "like a sickle crisis." Oxygen and duonebs PRN were given. Oxycodone was decreased to home percocet  q4 PRN for moderate pain with IV dilaudid 1 mg q6 PRN for severe pain. IVF 75 mL/hr for 1 day. On 8/4, patient reported significant improvement in symptoms and felt ready to be discharged.   "

## 2019-07-31 NOTE — HPI
Ms. Malone is a 39-year-old lady with PMH sickle cell, beta thalassemia, HTN, and asthma who presents with worsening SOB x 2 days after seeing her niece over the weekend who had URI symptoms. The dyspnea is worse when lying down and with exertion. She can only walk about 20 feet without becoming short of breath. She has an associated cough productive of greenish sputum on the morning of her admission but has since become clear. She also reports wheezing. Her home nebulizer has not relieved her symptoms. She reports diffuse chest heaviness since the night prior to admission and pain in left arm and her lower legs unrelieved by percocet at home. She describes a crackling she hears in her throat when she inspires that she has never heard before. ROS positive for headache, nasal congestion, numbness and tingling in her left leg for 2 weeks, dry eyes for 2 weeks, and chills.     She reports experiencing 1-3 vasooclusive crises per year that have required ED presentation and admission. She has had acute chest syndrome 3 times (1 year ago, 15 years ago, and once in between). She has also been admitted for pneumonia multiple times, reporting that whenever she is around sick contacts, she tends to develop pneumonia. She reports that her symptoms currently feel most similar to previous episodes of pneumonia and not acute chest syndrome. Her most recent admission for pneumonia was on 4/25/19, which resolved with 10 day course of Levaquin. She has a remote smoking history (stopped 20 years ago), and denies illicit drug use.

## 2019-07-31 NOTE — PLAN OF CARE
Problem: Physical Therapy Goal  Goal: Physical Therapy Goal  Goals to be met by: 2019     Patient will increase functional independence with mobility by performin. Sit to stand transfer with Modified [place]  2. Gait  x 150 feet with Supervision   3. Ascend/descend 15 stair with bilateral Handrails Supervision   Outcome: Ongoing (interventions implemented as appropriate)  Eval completed and POC established    Joce Laura PT,DPT  2019

## 2019-07-31 NOTE — PLAN OF CARE
CM met with patient for discharge planning.  Patient states that she lives with her mother and daughter in a two story house with 15 steps to climb to the second floor with a railing.  Patient is independent with ADL's.  Plan is to discharge home with family.    Pharmacy:    [id number] - [place] - [address]  [address]  [place] [address]  Phone: [phone] Fax: [phone]    PCP:  Balta Forbes MD    Payor: MEDICAID / Plan: [facility] CONNECT / Product Type: Managed Medicaid /      07/31/19 1536   Discharge Assessment   Assessment Type Discharge Planning Assessment   Confirmed/corrected address and phone number on facesheet? Yes   Assessment information obtained from? Patient   Expected Length of Stay (days) 3   Communicated expected length of stay with patient/caregiver no   Prior to hospitilization cognitive status: Alert/Oriented   Prior to hospitalization functional status: Independent   Current cognitive status: Alert/Oriented   Current Functional Status: Independent   Facility Arrived From: Emergency room   Lives With child(jayce), dependent;parent(s)   Able to Return to Prior Arrangements yes   Is patient able to care for self after discharge? Unable to determine at this time (comments)   Who are your caregiver(s) and their phone number(s)? Phyllis Benson - mother [phone]   Patient's perception of discharge disposition home or selfcare   Readmission Within the Last 30 Days no previous admission in last 30 days   Patient currently being followed by outpatient case management? No   Patient currently receives any other outside agency services? No   Equipment Currently Used at Home none   Do you have any problems affording any of your prescribed medications? No   Is the patient taking medications as prescribed? yes   Does the patient have transportation home? Yes   Transportation Anticipated family or friend will provide   Does the patient receive services at the  [facility]? No   Discharge Plan A Home with family   Discharge Plan B Home   DME Needed Upon Discharge  none   Patient/Family in Agreement with Plan yes

## 2019-07-31 NOTE — ASSESSMENT & PLAN NOTE
Patient with progressive dyspnea, diffuse wheezes on physical exam, and CXR with interstitial edema. BNP WNL. Differential diagnosis includes cardiogenic pulmonary edema and noncardiogenic pulmonary edema (viral vs. bacterial pneumonia, asthma exacerbation, acute chest syndrome, ARDS). Patient not on hydroxyurea at home.     Plan:  - consider TTE to r/o cardiogenic pulmonary edema  - supplemental oxygen  - monitor oxygen saturation   - consider duo-nebs  - prednisone 40 and levofloxacin 750 x 5 days

## 2019-07-31 NOTE — PHARMACY MED REC
"Admission Medication Reconciliation - Pharmacy Consult Note    The home medication history was taken by Ssuannah Floyd, Pharmacy Technician. Based on information gathered and subsequent review by the clinical pharmacist, the items below may need attention.     You may go to "Admission" then "Reconcile Home Medications" tabs to review and/or act upon these items.     Potentially problematic discrepancies with current MAR  o Patient IS taking the following which was not ordered upon admit  o Cyclobenzaprine 10 mg PO TID prn muscle spasms  o Hydrochlorothiazide 25 mg PO daily     Please address this information as you see fit.  Feel free to contact us if you have any questions or require assistance.    Zayra Steward, PharmD, BCPS  [id number]     PTA Medications   Medication Sig    acetaminophen (TYLENOL EXTRA STRENGTH) 500 MG tablet Take 1,000 mg by mouth daily as needed for Pain.    albuterol (VENTOLIN HFA) 90 mcg/actuation inhaler Inhale 2 puffs into the lungs every 6 (six) hours as needed for Wheezing. Rescue    amLODIPine (NORVASC) 10 MG tablet Take 1 tablet (10 mg total) by mouth once daily.    ascorbic acid, vitamin C, (VITAMIN C) 500 MG tablet Take 500 mg by mouth once daily.    budesonide-formoterol 80-4.5 mcg (SYMBICORT) 80-4.5 mcg/actuation HFAA Inhale 2 puffs into the lungs 2 (two) times daily as needed. Controller    calcium carbonate (TUMS) 200 mg calcium (500 mg) chewable tablet Take 1 tablet by mouth daily as needed for Heartburn (stomach).    carBAMazepine (TEGRETOL) 200 mg tablet Take 4 tablets (800 mg total) by mouth 2 (two) times daily.    carvedilol (COREG) 12.5 MG tablet Take 1 tablet (12.5 mg total) by mouth 2 (two) times daily.    cyclobenzaprine (FLEXERIL) 10 MG tablet Take 1 tablet (10 mg total) by mouth 3 (three) times daily as needed. (Patient taking differently: Take 10 mg by mouth 3 (three) times daily as needed for Muscle spasms. )    ergocalciferol (VITAMIN D2) 50,000 unit Cap Take " 1 capsule (50,000 Units total) by mouth every 7 days. (Patient taking differently: Take 50,000 Units by mouth every 7 days. Wed.)    FLUoxetine 40 MG capsule TAKE 1 CAPSULE BY MOUTH EVERY DAY    fluticasone (FLONASE) 50 mcg/actuation nasal spray 2 sprays (100 mcg total) by Each Nare route once daily.    gabapentin (NEURONTIN) 800 MG tablet Take 1 tablet (800 mg total) by mouth 2 (two) times daily.    hydroCHLOROthiazide (HYDRODIURIL) 25 MG tablet Take 1 tablet (25 mg total) by mouth once daily.    ondansetron (ZOFRAN-ODT) 8 MG TbDL Dissolve 1 tablet (8 mg total) by mouth every 6 (six) hours as needed.    oxyCODONE-acetaminophen (PERCOCET)  mg per tablet Take 1 tablet by mouth every 4 (four) hours as needed for Pain.    prochlorperazine (COMPAZINE) 10 MG tablet Take 1 tablet (10 mg total) by mouth every 6 (six) hours as needed.    senna-docusate 8.6-50 mg (PERICOLACE) 8.6-50 mg per tablet Take 1 tablet by mouth 2 (two) times daily. (Patient taking differently: Take 1 tablet by mouth 2 (two) times daily as needed for Constipation. )     .    .

## 2019-07-31 NOTE — SUBJECTIVE & OBJECTIVE
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      Current Outpatient Medications on File Prior to Encounter   Medication Sig    albuterol (ACCUNEB) 0.63 mg/3 mL Nebu Take 3 mLs (0.63 mg total) by nebulization every 6 (six) hours as needed (for wheezing/shortness of breath). Rescue    amLODIPine (NORVASC) 10 MG tablet Take 1 tablet (10 mg total) by mouth once daily.    budesonide-formoterol 80-4.5 mcg (SYMBICORT) 80-4.5 mcg/actuation HFAA Inhale 2 puffs into the lungs 2 (two) times daily as needed. Controller    carBAMazepine (TEGRETOL) 200 mg tablet Take 4 tablets (800 mg total) by mouth 2 (two) times daily.    carvedilol (COREG) 12.5 MG tablet Take 1 tablet (12.5 mg total) by mouth 2 (two) times daily.    gabapentin (NEURONTIN) 800 MG tablet Take 1 tablet (800 mg total) by mouth 2 (two) times daily.    hydroCHLOROthiazide (HYDRODIURIL) 25 MG tablet Take 1 tablet (25 mg total) by mouth once daily.    oxyCODONE-acetaminophen (PERCOCET)  mg per tablet Take 1 tablet by mouth every 4 (four) hours as needed for Pain.    acetaminophen (TYLENOL EXTRA STRENGTH) 500 MG tablet Take 1,000 mg by mouth daily as needed for Pain.    albuterol (VENTOLIN HFA) 90 mcg/actuation inhaler Inhale 2 puffs into the lungs every 6 (six)  hours as needed for Wheezing. Rescue    ascorbic acid, vitamin C, (VITAMIN C) 500 MG tablet Take 500 mg by mouth once daily.    calcium carbonate (TUMS) 200 mg calcium (500 mg) chewable tablet Take 1 tablet by mouth daily as needed for Heartburn (stomach).    cyclobenzaprine (FLEXERIL) 10 MG tablet Take 1 tablet (10 mg total) by mouth 3 (three) times daily as needed.    ergocalciferol (VITAMIN D2) 50,000 unit Cap Take 1 capsule (50,000 Units total) by mouth every 7 days.    FLUoxetine 40 MG capsule TAKE 1 CAPSULE BY MOUTH EVERY DAY    fluticasone (FLONASE) 50 mcg/actuation nasal spray 2 sprays (100 mcg total) by Each Nare route once daily.    fluticasone furoate-vilanterol (BREO ELLIPTA) 100-25 mcg/dose diskus inhaler Inhale 1 puff into the lungs once daily. Controller    fluticasone-salmeterol diskus inhaler 100-50 mcg Inhale 1 puff into the lungs 2 (two) times daily. Controller    ondansetron (ZOFRAN-ODT) 8 MG TbDL Dissolve 1 tablet (8 mg total) by mouth every 6 (six) hours as needed.    ondansetron (ZOFRAN-ODT) 8 MG TbDL Take 1 tablet (8 mg total) by mouth every 6 (six) hours as needed.    prochlorperazine (COMPAZINE) 10 MG tablet Take 1 tablet (10 mg total) by mouth every 6 (six) hours as needed.    promethazine (PHENERGAN) 6.25 mg/5 mL syrup Take 20 mLs (25 mg total) by mouth every 6 (six) hours as needed for Nausea.    senna-docusate 8.6-50 mg (PERICOLACE) 8.6-50 mg per tablet Take 1 tablet by mouth 2 (two) times daily. (Patient taking differently: Take 1 tablet by mouth 2 (two) times daily as needed for Constipation. )    VENTOLIN HFA 90 mcg/actuation inhaler INHALE 2 PUFFS INTO THE LUNGS EVERY 6 (SIX) HOURS AS NEEDED FOR WHEEZING. RESCUE     Family History     Problem Relation (Age of Onset)    Diabetes Mother    Heart disease Mother, Father        Tobacco Use    Smoking status: Never Smoker    Smokeless tobacco: Never Used   Substance and Sexual Activity    Alcohol use: No    Drug use: No     Sexual activity: Not Currently     Birth control/protection: See Surgical Hx     Review of Systems   Constitutional: Positive for activity change and chills. Negative for fever and unexpected weight change.   HENT: Positive for congestion. Negative for hearing loss, sore throat, tinnitus and trouble swallowing.    Eyes: Negative for pain and visual disturbance.   Respiratory: Positive for cough, chest tightness and shortness of breath.    Cardiovascular: Negative for chest pain and leg swelling.   Gastrointestinal: Negative for abdominal pain, blood in stool, nausea and vomiting.   Endocrine: Negative for cold intolerance and heat intolerance.   Genitourinary: Negative for decreased urine volume and difficulty urinating.   Musculoskeletal: Positive for back pain and myalgias.        Bilateral leg pain, left arm pain   Skin: Negative for color change and rash.   Neurological: Positive for numbness and headaches.     Objective:     Vital Signs (Most Recent):  Temp: 98.3 °F (36.8 °C) (07/30/19 2000)  Pulse: 86 (07/30/19 2053)  Resp: 20 (07/30/19 2053)  BP: (!) 171/81 (07/30/19 2000)  SpO2: 98 % (07/30/19 2053) Vital Signs (24h Range):  Temp:  [98.3 °F (36.8 °C)-98.9 °F (37.2 °C)] 98.3 °F (36.8 °C)  Pulse:  [83-97] 86  Resp:  [15-20] 20  SpO2:  [96 %-99 %] 98 %  BP: (161-177)/(81-98) 171/81     Weight: 136.1 kg (300 lb)  Body mass index is 54.87 kg/m².    Physical Exam   Constitutional: She appears well-developed and well-nourished. No distress.   HENT:   Head: Normocephalic.   Eyes: Pupils are equal, round, and reactive to light. Conjunctivae and EOM are normal. No scleral icterus.   Neck: Normal range of motion. Neck supple.   Cardiovascular: Normal rate, regular rhythm and normal heart sounds.   Pulmonary/Chest: She has wheezes.   bilaterally   Abdominal: Soft. Bowel sounds are normal. She exhibits no distension. There is no tenderness.   Musculoskeletal: She exhibits no edema or tenderness.   Skin: Skin is warm  and dry. No rash noted. She is not diaphoretic. No erythema.         CRANIAL NERVES     CN III, IV, VI   Pupils are equal, round, and reactive to light.  Extraocular motions are normal.        Significant Labs: All pertinent labs within the past 24 hours have been reviewed.    Significant Imaging: I have reviewed and interpreted all pertinent imaging results/findings within the past 24 hours.

## 2019-07-31 NOTE — PT/OT/SLP EVAL
Occupational Therapy   Evaluation    Name: Nazanin Malone  MRN: [id number]  Admitting Diagnosis:  Sickle Cell Pain Crisis    Recommendations:     Discharge Recommendations: home  Discharge Equipment Recommendations:  shower chair  Barriers to discharge:  None    Assessment:     Nazanin Malone is a 39 y.o. female with a medical diagnosis of Sickle cell pain crisis.  She presents alert and willing to participate in therapy session. Upon arrival, pt found supine with HOB elevated and no family in room. Pt's chief complaint being fatigue, weakness, and feeling SOB.  Performance deficits affecting function: impaired cardiopulmonary response to activity, impaired endurance, weakness.  Anticipate no OT needs upon discharge however would benefit from one more acute OT session to address functional strength and endurance for ADL/IADL participation.     Rehab Prognosis: Good; patient would benefit from acute skilled OT services to address these deficits and reach maximum level of function.       Plan:     Patient to be seen 2 x/week to address the above listed problems via self-care/home management, therapeutic activities, therapeutic exercises, neuromuscular re-education  · Plan of Care Expires: 08/29/19  · Plan of Care Reviewed with: patient    Subjective     Chief Complaint: SOB/ fatigue  Patient/Family Comments/goals: Return to PLOF    Occupational Profile:  Living Environment: Pt lives with mother and two daughters ( 15, 12 yo); bathroom contains walk-in shower  Previous level of function: PTA, pt reports being independent with ADL and functional mobility   Roles and Routines: Home/community dweller, drives, works as LPN, mother   Equipment Used at Home:  none  Assistance upon Discharge: Pt will have assistance from family     Pain/Comfort:  · Pain Rating 1: 0/10  · Pain Rating Post-Intervention 1: 0/10    Patients cultural, spiritual, Advent conflicts given the current situation: no    Objective:      Communicated with: RN prior to session.  Patient found supine with telemetry upon OT entry to room.    General Precautions: Standard, fall   Orthopedic Precautions:N/A   Braces: N/A     Occupational Performance:    Bed Mobility:    · Patient completed Rolling/Turning to Right with independence  · Patient completed Supine to Sit with independence  · Patient completed Sit to Supine with independence    Functional Mobility/Transfers:  · Patient completed Sit <> Stand Transfer with supervision  with  no assistive device   · Functional Mobility: Pt completed functional mobility completed within room for ADL prep requiring SBA and no AD. She tolerated well however distance limited 2/2 fatigue.     Activities of Daily Living:  · Lower Body Dressing: stand by assistance to vianca B socks while seated in bed    Cognitive/Visual Perceptual:  Cognitive/Psychosocial Skills:     -       Oriented to: Person, Place, Time and Situation   -       Follows Commands/attention:Follows multistep  commands  -       Communication: clear/fluent  -       Safety awareness/insight to disability: intact   -       Mood/Affect/Coping skills/emotional control: Appropriate to situation  Visual/Perceptual:      -Intact     Physical Exam:  Postural examination/scapula alignment:    -       Rounded shoulders  Skin integrity: Visible skin intact  Edema:  None noted  Dominant hand:    -       RUE  Upper Extremity Range of Motion:     -       Right Upper Extremity: WFL  -       Left Upper Extremity: WFL  Upper Extremity Strength:    -       Right Upper Extremity: WFL  -       Left Upper Extremity: WFL   Strength:    -       Right Upper Extremity: WFL  -       Left Upper Extremity: WFL  Fine Motor Coordination:    -       Intact    AMPAC 6 Click ADL:  AMPAC Total Score: 21    Treatment & Education:  -Pt edu on OT role/POC  -Importance of OOB activity with staff assistance ( UIC during each meal)  -Safety during functional t/f and mobility  -White  board updated  - Multiple self care tasks completed--as noted above  - All questions/concerns answered within OT scope of practice  Education:    Patient left supine with all lines intact, call button in reach and RN notified    GOALS:   Multidisciplinary Problems     Occupational Therapy Goals        Problem: Occupational Therapy Goal    Goal Priority Disciplines Outcome Interventions   Occupational Therapy Goal     OT, PT/OT Ongoing (interventions implemented as appropriate)    Description:  Goals to be met by: 8/10/19    Patient will increase functional independence with ADLs by performing:    UE Dressing with [place].  LE Dressing with [place].  Grooming while standing at sink with [place].  Toileting from toilet with [place] for hygiene and clothing management.   Pt tolerated 10 minute standing functional activity with independence; without seated rest break required.   Toilet transfer to toilet with [place].  Upper extremity exercise program per handout, with independence.                      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

## 2019-07-31 NOTE — PT/OT/SLP EVAL
Physical Therapy Evaluation    Patient Name:  Nazanin Malone   MRN:  [id number]    Recommendations:     Discharge Recommendations:  home   Discharge Equipment Recommendations: shower chair   Barriers to discharge: None    Assessment:     Nazanin Malone is a 39 y.o. female admitted with a medical diagnosis of <principal problem not specified>.  She presents with the following impairments/functional limitations:  impaired endurance, weakness, impaired balance, impaired self care skills, impaired functional mobilty, gait instability, pain, impaired cardiopulmonary response to activity.  Tolerated session c c/o fatigue and pain.  Performed mobility c I-SBA.  Pt able to amb in room c SBA without AD - limited by endurance.  Pt safe to amb c assistance of 1x person. Pt would benefit from continued skilled acute PT 3x/wk to improve functional mobility.  Recommending pt return home c no additional PT needed once medically appropriate      Rehab Prognosis: Good; patient would benefit from acute skilled PT services to address these deficits and reach maximum level of function.    Recent Surgery: * No surgery found *      Plan:     During this hospitalization, patient to be seen 3 x/week to address the identified rehab impairments via gait training, therapeutic activities, therapeutic exercises, neuromuscular re-education and progress toward the following goals:    · Plan of Care Expires:  08/25/19    Subjective     Chief Complaint: fatigue; pain  Patient/Family Comments/goals: to return home  Pain/Comfort:  · Pain Rating 1: (reports pain but did not rate)    Patients cultural, spiritual, Yazdanism conflicts given the current situation: no    Living Environment:  Pt lives c mother and daughters in [facility] c [address].  PTA driving and working as LPN. No falls.  Prior to admission, patients level of function was independent.  Equipment used at home: none.  DME owned (not currently used): none.  Upon discharge, patient will  have assistance from family.    Objective:     Communicated with RN and OT prior to session.  Patient found HOB elevated with oxygen, telemetry, peripheral IV  upon PT entry to room.    General Precautions: Standard, fall   Orthopedic Precautions:N/A   Braces: N/A     Exams:  · Cognitive Exam:  Patient is oriented to Person, Place, Time and Situation  · RLE ROM: WFL  · RLE Strength: WFL  · LLE ROM: WFL  · LLE Strength: WFL    Functional Mobility:  · Bed Mobility:     · Rolling Right: independence  · Scooting: independence  · Supine to Sit: independence  · Sit to Supine: independence  · Transfers:     · Sit to Stand:  supervision with no AD  · Gait: 20ft c no AD SBA  · Steady gait, limited by endurance  · Balance: sitting (I); standing (SBA)      Therapeutic Activities and Exercises:  Pt educated on: PT role/POC; safety c mobility; benefits of OOB activities; performing therex; d/c recs - v/u      AM-PAC 6 CLICK MOBILITY  Total Score:20     Patient left HOB elevated with all lines intact, call button in reach and RN notified.    GOALS:   Multidisciplinary Problems     Physical Therapy Goals        Problem: Physical Therapy Goal    Goal Priority Disciplines Outcome Goal Variances Interventions   Physical Therapy Goal     PT, PT/OT Ongoing (interventions implemented as appropriate)     Description:  Goals to be met by: 2019     Patient will increase functional independence with mobility by performin. Sit to stand transfer with Modified [place]  2. Gait  x 150 feet with Supervision   3. 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    Asthma     Depression     Hypertension     Morbid obesity     Opioid dependence 2017    Pneumonia due to Streptococcus pneumoniae 2017    Right lower lobe  pneumonia 2017    Sepsis due to Streptococcus pneumoniae 2017    Sickle cell-beta thalassemia disease with pain     Trigeminal neuralgia        Past Surgical History:   Procedure Laterality Date     SECTION      TONSILLECTOMY      TUBAL LIGATION         Time Tracking:     PT Received On: 19  PT Start Time: 909     PT Stop Time: 921  PT Total Time (min): 12 min     Billable Minutes: Evaluation 12 min      Joce Laura, PT  2019

## 2019-07-31 NOTE — H&P
[facility] Hospital Medicine  History & Physical    Patient Name: Nazanin Malone  MRN: [id number]  Admission Date: 7/30/2019  Attending Physician: Binu Ferro MD   Primary Care Provider: Balta Forbes MD    [facility] Medicine Team: [facility] HOSP MED 1 Bridget Mott MD     Patient information was obtained from patient.     Subjective:     Principal Problem:<principal problem not specified>    Chief Complaint:   Chief Complaint   Patient presents with    Shortness of Breath     Reports of cough and congestion and leg pain    Sickle Cell Pain Crisis        HPI: Ms. Malone is a 39-year-old lady with PMH sickle cell, beta thalassemia, HTN, and asthma who presents with worsening SOB x 2 days after seeing her niece over the weekend who had URI symptoms. The dyspnea is worse when lying down and with exertion. She can only walk about 20 feet without becoming short of breath. She has an associated cough productive of greenish sputum on the morning of her admission but has since become clear. She also reports wheezing. Her home nebulizer has not relieved her symptoms. She reports diffuse chest heaviness since the night prior to admission and pain in left arm and her lower legs unrelieved by percocet at home. She describes a crackling she hears in her throat when she inspires that she has never heard before. ROS positive for headache, nasal congestion, numbness and tingling in her left leg for 2 weeks, dry eyes for 2 weeks, and chills.     She reports experiencing 1-3 vasooclusive crises per year that have required ED presentation and admission. She has had acute chest syndrome 3 times (1 year ago, 15 years ago, and once in between). She has also been admitted for pneumonia multiple times, reporting that whenever she is around sick contacts, she tends to develop pneumonia. She reports that her symptoms currently feel most similar to previous episodes of pneumonia and not acute chest syndrome. Her most  recent admission for pneumonia was on 19, which resolved with 10 day course of Levaquin. She has a remote smoking history (stopped 20 years ago), and denies illicit drug use.    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      Current Outpatient Medications on File Prior to Encounter   Medication Sig    albuterol (ACCUNEB) 0.63 mg/3 mL Nebu Take 3 mLs (0.63 mg total) by nebulization every 6 (six) hours as needed (for wheezing/shortness of breath). Rescue    amLODIPine (NORVASC) 10 MG tablet Take 1 tablet (10 mg total) by mouth once daily.    budesonide-formoterol 80-4.5 mcg (SYMBICORT) 80-4.5 mcg/actuation HFAA Inhale 2 puffs into the lungs 2 (two) times daily as needed. Controller    carBAMazepine (TEGRETOL) 200 mg tablet Take 4 tablets (800 mg total) by mouth 2 (two) times daily.    carvedilol (COREG) 12.5 MG tablet Take 1 tablet (12.5 mg total) by mouth 2 (two) times daily.    gabapentin (NEURONTIN) 800 MG tablet Take 1 tablet (800 mg total) by mouth 2 (two) times daily.    hydroCHLOROthiazide (HYDRODIURIL) 25 MG tablet Take 1 tablet (25 mg total) by mouth once daily.    oxyCODONE-acetaminophen (PERCOCET)  mg per tablet Take 1 tablet by mouth every 4 (four) hours as needed for Pain.    acetaminophen  (TYLENOL EXTRA STRENGTH) 500 MG tablet Take 1,000 mg by mouth daily as needed for Pain.    albuterol (VENTOLIN HFA) 90 mcg/actuation inhaler Inhale 2 puffs into the lungs every 6 (six) hours as needed for Wheezing. Rescue    ascorbic acid, vitamin C, (VITAMIN C) 500 MG tablet Take 500 mg by mouth once daily.    calcium carbonate (TUMS) 200 mg calcium (500 mg) chewable tablet Take 1 tablet by mouth daily as needed for Heartburn (stomach).    cyclobenzaprine (FLEXERIL) 10 MG tablet Take 1 tablet (10 mg total) by mouth 3 (three) times daily as needed.    ergocalciferol (VITAMIN D2) 50,000 unit Cap Take 1 capsule (50,000 Units total) by mouth every 7 days.    FLUoxetine 40 MG capsule TAKE 1 CAPSULE BY MOUTH EVERY DAY    fluticasone (FLONASE) 50 mcg/actuation nasal spray 2 sprays (100 mcg total) by Each Nare route once daily.    fluticasone furoate-vilanterol (BREO ELLIPTA) 100-25 mcg/dose diskus inhaler Inhale 1 puff into the lungs once daily. Controller    fluticasone-salmeterol diskus inhaler 100-50 mcg Inhale 1 puff into the lungs 2 (two) times daily. Controller    ondansetron (ZOFRAN-ODT) 8 MG TbDL Dissolve 1 tablet (8 mg total) by mouth every 6 (six) hours as needed.    ondansetron (ZOFRAN-ODT) 8 MG TbDL Take 1 tablet (8 mg total) by mouth every 6 (six) hours as needed.    prochlorperazine (COMPAZINE) 10 MG tablet Take 1 tablet (10 mg total) by mouth every 6 (six) hours as needed.    promethazine (PHENERGAN) 6.25 mg/5 mL syrup Take 20 mLs (25 mg total) by mouth every 6 (six) hours as needed for Nausea.    senna-docusate 8.6-50 mg (PERICOLACE) 8.6-50 mg per tablet Take 1 tablet by mouth 2 (two) times daily. (Patient taking differently: Take 1 tablet by mouth 2 (two) times daily as needed for Constipation. )    VENTOLIN HFA 90 mcg/actuation inhaler INHALE 2 PUFFS INTO THE LUNGS EVERY 6 (SIX) HOURS AS NEEDED FOR WHEEZING. RESCUE     Family History     Problem Relation (Age of Onset)    Diabetes Mother     Heart disease Mother, Father        Tobacco Use    Smoking status: Never Smoker    Smokeless tobacco: Never Used   Substance and Sexual Activity    Alcohol use: No    Drug use: No    Sexual activity: Not Currently     Birth control/protection: See Surgical Hx     Review of Systems   Constitutional: Positive for activity change and chills. Negative for fever and unexpected weight change.   HENT: Positive for congestion. Negative for hearing loss, sore throat, tinnitus and trouble swallowing.    Eyes: Negative for pain and visual disturbance.   Respiratory: Positive for cough, chest tightness and shortness of breath.    Cardiovascular: Negative for chest pain and leg swelling.   Gastrointestinal: Negative for abdominal pain, blood in stool, nausea and vomiting.   Endocrine: Negative for cold intolerance and heat intolerance.   Genitourinary: Negative for decreased urine volume and difficulty urinating.   Musculoskeletal: Positive for back pain and myalgias.        Bilateral leg pain, left arm pain   Skin: Negative for color change and rash.   Neurological: Positive for numbness and headaches.     Objective:     Vital Signs (Most Recent):  Temp: 98.3 °F (36.8 °C) (07/30/19 2000)  Pulse: 86 (07/30/19 2053)  Resp: 20 (07/30/19 2053)  BP: (!) 171/81 (07/30/19 2000)  SpO2: 98 % (07/30/19 2053) Vital Signs (24h Range):  Temp:  [98.3 °F (36.8 °C)-98.9 °F (37.2 °C)] 98.3 °F (36.8 °C)  Pulse:  [83-97] 86  Resp:  [15-20] 20  SpO2:  [96 %-99 %] 98 %  BP: (161-177)/(81-98) 171/81     Weight: 136.1 kg (300 lb)  Body mass index is 54.87 kg/m².    Physical Exam   Constitutional: She appears well-developed and well-nourished. No distress.   HENT:   Head: Normocephalic.   Eyes: Pupils are equal, round, and reactive to light. Conjunctivae and EOM are normal. No scleral icterus.   Neck: Normal range of motion. Neck supple.   Cardiovascular: Normal rate, regular rhythm and normal heart sounds.   Pulmonary/Chest: She has wheezes.    bilaterally   Abdominal: Soft. Bowel sounds are normal. She exhibits no distension. There is no tenderness.   Musculoskeletal: She exhibits no edema or tenderness.   Skin: Skin is warm and dry. No rash noted. She is not diaphoretic. No erythema.         CRANIAL NERVES     CN III, IV, VI   Pupils are equal, round, and reactive to light.  Extraocular motions are normal.        Significant Labs: All pertinent labs within the past 24 hours have been reviewed.    Significant Imaging: I have reviewed and interpreted all pertinent imaging results/findings within the past 24 hours.    Assessment/Plan:     Respiratory failure, acute  Patient with progressive dyspnea, diffuse wheezes on physical exam, and CXR with interstitial edema. BNP WNL. Differential diagnosis includes cardiogenic pulmonary edema and noncardiogenic pulmonary edema (viral vs. bacterial pneumonia, asthma exacerbation, acute chest syndrome, ARDS). Patient not on hydroxyurea at home.     Plan:  - consider TTE to r/o cardiogenic pulmonary edema  - supplemental oxygen  - monitor oxygen saturation   - consider duo-nebs  - prednisone 40 and levofloxacin 750 x 5 days     Sickle cell pain crisis  History of sickle cell and beta thalassemia with previous vasooclusive crises.     Plan:  -oxygen (hold fluids because of interstitial edema on CXR)  -pain control with tylenol and prn oxycodone      Intermittent asthma  - continue home albuterol      Benign essential HTN  - continue home amlodipine 10 and carvedilol 12.5 BID      Trigeminal neuralgia  - continue home carbamazepine 12.5 BID PO        VTE Risk Mitigation (From admission, onward)        Ordered     enoxaparin injection 40 mg  Every 12 hours      07/30/19 2112     IP VTE HIGH RISK PATIENT  Once      07/30/19 1837             Bridget Mott MD  Department of Hospital Medicine   [facility]

## 2019-07-31 NOTE — PLAN OF CARE
Problem: Occupational Therapy Goal  Goal: Occupational Therapy Goal  Goals to be met by: 8/10/19    Patient will increase functional independence with ADLs by performing:    UE Dressing with [place].  LE Dressing with [place].  Grooming while standing at sink with [place].  Toileting from toilet with [place] for hygiene and clothing management.   Pt tolerated 10 minute standing functional activity with independence; without seated rest break required.   Toilet transfer to toilet with [place].  Upper extremity exercise program per handout, with independence.    Outcome: Ongoing (interventions implemented as appropriate)  Evaluation completed. Initiate POC.   Radha Ludwig OT  7/31/2019

## 2019-07-31 NOTE — PROGRESS NOTES
RT called to pt's room due to pt complaining of SOB. Pt was on 2L NC and pt's SAT was 97%. Pt was already scheduled Q6 Duoneb. RT called Dr. Gleason on Hosp Med team 1 and suggested Q4 duonebs so pt could receive txs more frequently. Pt took 0000 tx and remained diminshed with minimal end expiratory wheezing throughout. Pt stated she remained tight. RT suggested CPAP to pt and pt said she wore one before and ripped it off. Also, she stated that a CPAP machine doesn't help her. Pt remains on 2L and seemed less SOB.   Joan VALDES, CRT

## 2019-08-01 PROBLEM — D57.1 HB-SS DISEASE WITHOUT CRISIS: Status: ACTIVE | Noted: 2019-07-30

## 2019-08-01 LAB
ALBUMIN SERPL BCP-MCNC: 3.4 G/DL (ref 3.5–5.2)
ALP SERPL-CCNC: 90 U/L (ref 55–135)
ALT SERPL W/O P-5'-P-CCNC: 16 U/L (ref 10–44)
ANION GAP SERPL CALC-SCNC: 8 MMOL/L (ref 8–16)
ASCENDING AORTA: 3.17 CM
AST SERPL-CCNC: 21 U/L (ref 10–40)
AV INDEX (PROSTH): 0.98
AV MEAN GRADIENT: 4 MMHG
AV PEAK GRADIENT: 7 MMHG
AV VALVE AREA: 3.74 CM2
AV VELOCITY RATIO: 0.79
BASOPHILS # BLD AUTO: 0.03 K/UL (ref 0–0.2)
BASOPHILS NFR BLD: 0.4 % (ref 0–1.9)
BILIRUB SERPL-MCNC: 0.3 MG/DL (ref 0.1–1)
BSA FOR ECHO PROCEDURE: 2.44 M2
BUN SERPL-MCNC: 12 MG/DL (ref 6–20)
CALCIUM SERPL-MCNC: 8.8 MG/DL (ref 8.7–10.5)
CHLORIDE SERPL-SCNC: 104 MMOL/L (ref 95–110)
CO2 SERPL-SCNC: 25 MMOL/L (ref 23–29)
CREAT SERPL-MCNC: 0.9 MG/DL (ref 0.5–1.4)
CV ECHO LV RWT: 0.43 CM
DIFFERENTIAL METHOD: ABNORMAL
DOP CALC AO PEAK VEL: 1.34 M/S
DOP CALC AO VTI: 23.22 CM
DOP CALC LVOT AREA: 3.8 CM2
DOP CALC LVOT DIAMETER: 2.21 CM
DOP CALC LVOT PEAK VEL: 1.06 M/S
DOP CALC LVOT STROKE VOLUME: 86.88 CM3
DOP CALCLVOT PEAK VEL VTI: 22.66 CM
E WAVE DECELERATION TIME: 227.56 MSEC
E/A RATIO: 1.14
E/E' RATIO: 12.8 M/S
ECHO LV POSTERIOR WALL: 1.16 CM (ref 0.6–1.1)
EOSINOPHIL # BLD AUTO: 0.1 K/UL (ref 0–0.5)
EOSINOPHIL NFR BLD: 1.6 % (ref 0–8)
ERYTHROCYTE [DISTWIDTH] IN BLOOD BY AUTOMATED COUNT: 17.4 % (ref 11.5–14.5)
EST. GFR  (AFRICAN AMERICAN): >60 ML/MIN/1.73 M^2
EST. GFR  (NON AFRICAN AMERICAN): >60 ML/MIN/1.73 M^2
FRACTIONAL SHORTENING: 30 % (ref 28–44)
GLUCOSE SERPL-MCNC: 107 MG/DL (ref 70–110)
HCT VFR BLD AUTO: 29.8 % (ref 37–48.5)
HGB BLD-MCNC: 9.2 G/DL (ref 12–16)
IMM GRANULOCYTES # BLD AUTO: 0.03 K/UL (ref 0–0.04)
IMM GRANULOCYTES NFR BLD AUTO: 0.4 % (ref 0–0.5)
INTERVENTRICULAR SEPTUM: 1.08 CM (ref 0.6–1.1)
LA MAJOR: 5.23 CM
LA MINOR: 5.06 CM
LA WIDTH: 4.49 CM
LEFT ATRIUM SIZE: 3.66 CM
LEFT ATRIUM VOLUME INDEX: 31.6 ML/M2
LEFT ATRIUM VOLUME: 71.85 CM3
LEFT INTERNAL DIMENSION IN SYSTOLE: 3.77 CM (ref 2.1–4)
LEFT VENTRICLE DIASTOLIC VOLUME INDEX: 62.56 ML/M2
LEFT VENTRICLE DIASTOLIC VOLUME: 142.08 ML
LEFT VENTRICLE MASS INDEX: 106 G/M2
LEFT VENTRICLE SYSTOLIC VOLUME INDEX: 26.8 ML/M2
LEFT VENTRICLE SYSTOLIC VOLUME: 60.96 ML
LEFT VENTRICULAR INTERNAL DIMENSION IN DIASTOLE: 5.41 CM (ref 3.5–6)
LEFT VENTRICULAR MASS: 241.34 G
LV LATERAL E/E' RATIO: 12 M/S
LV SEPTAL E/E' RATIO: 13.71 M/S
LYMPHOCYTES # BLD AUTO: 3.1 K/UL (ref 1–4.8)
LYMPHOCYTES NFR BLD: 37.3 % (ref 18–48)
MAGNESIUM SERPL-MCNC: 2.2 MG/DL (ref 1.6–2.6)
MCH RBC QN AUTO: 21.2 PG (ref 27–31)
MCHC RBC AUTO-ENTMCNC: 30.9 G/DL (ref 32–36)
MCV RBC AUTO: 69 FL (ref 82–98)
MONOCYTES # BLD AUTO: 1 K/UL (ref 0.3–1)
MONOCYTES NFR BLD: 12.1 % (ref 4–15)
MV PEAK A VEL: 0.84 M/S
MV PEAK E VEL: 0.96 M/S
NEUTROPHILS # BLD AUTO: 3.9 K/UL (ref 1.8–7.7)
NEUTROPHILS NFR BLD: 48.2 % (ref 38–73)
NRBC BLD-RTO: 1 /100 WBC
PHOSPHATE SERPL-MCNC: 3.2 MG/DL (ref 2.7–4.5)
PISA TR MAX VEL: 1.92 M/S
PLATELET # BLD AUTO: 193 K/UL (ref 150–350)
PMV BLD AUTO: 9.8 FL (ref 9.2–12.9)
POTASSIUM SERPL-SCNC: 4.1 MMOL/L (ref 3.5–5.1)
PROT SERPL-MCNC: 7.5 G/DL (ref 6–8.4)
PULM VEIN S/D RATIO: 1.22
PV PEAK D VEL: 0.58 M/S
PV PEAK S VEL: 0.71 M/S
RA MAJOR: 4.88 CM
RA PRESSURE: 3 MMHG
RA WIDTH: 3.5 CM
RBC # BLD AUTO: 4.33 M/UL (ref 4–5.4)
RIGHT VENTRICULAR END-DIASTOLIC DIMENSION: 4.21 CM
RV TISSUE DOPPLER FREE WALL SYSTOLIC VELOCITY 1 (APICAL 4 CHAMBER VIEW): 12.12 CM/S
SINUS: 2.98 CM
SODIUM SERPL-SCNC: 137 MMOL/L (ref 136–145)
STJ: 2.7 CM
TDI LATERAL: 0.08 M/S
TDI SEPTAL: 0.07 M/S
TDI: 0.08 M/S
TR MAX PG: 15 MMHG
TRICUSPID ANNULAR PLANE SYSTOLIC EXCURSION: 2.23 CM
TV REST PULMONARY ARTERY PRESSURE: 18 MMHG
WBC # BLD AUTO: 8.17 K/UL (ref 3.9–12.7)

## 2019-08-01 PROCEDURE — 80053 COMPREHEN METABOLIC PANEL: CPT

## 2019-08-01 PROCEDURE — 99233 PR SUBSEQUENT HOSPITAL CARE,LEVL III: ICD-10-PCS | Mod: ,,,

## 2019-08-01 PROCEDURE — 83735 ASSAY OF MAGNESIUM: CPT

## 2019-08-01 PROCEDURE — 85025 COMPLETE CBC W/AUTO DIFF WBC: CPT

## 2019-08-01 PROCEDURE — 25000003 PHARM REV CODE 250: Performed by: STUDENT IN AN ORGANIZED HEALTH CARE EDUCATION/TRAINING PROGRAM

## 2019-08-01 PROCEDURE — 36415 COLL VENOUS BLD VENIPUNCTURE: CPT

## 2019-08-01 PROCEDURE — 63600175 PHARM REV CODE 636 W HCPCS: Performed by: STUDENT IN AN ORGANIZED HEALTH CARE EDUCATION/TRAINING PROGRAM

## 2019-08-01 PROCEDURE — 94640 AIRWAY INHALATION TREATMENT: CPT

## 2019-08-01 PROCEDURE — 94761 N-INVAS EAR/PLS OXIMETRY MLT: CPT

## 2019-08-01 PROCEDURE — 25000242 PHARM REV CODE 250 ALT 637 W/ HCPCS: Performed by: STUDENT IN AN ORGANIZED HEALTH CARE EDUCATION/TRAINING PROGRAM

## 2019-08-01 PROCEDURE — 84100 ASSAY OF PHOSPHORUS: CPT

## 2019-08-01 PROCEDURE — 99233 SBSQ HOSP IP/OBS HIGH 50: CPT | Mod: ,,,

## 2019-08-01 PROCEDURE — 99900035 HC TECH TIME PER 15 MIN (STAT)

## 2019-08-01 PROCEDURE — 27000221 HC OXYGEN, UP TO 24 HOURS

## 2019-08-01 PROCEDURE — 11000001 HC ACUTE MED/SURG PRIVATE ROOM

## 2019-08-01 RX ORDER — BENZONATATE 100 MG/1
100 CAPSULE ORAL 3 TIMES DAILY PRN
Status: DISCONTINUED | OUTPATIENT
Start: 2019-08-01 | End: 2019-08-04 | Stop reason: HOSPADM

## 2019-08-01 RX ORDER — IPRATROPIUM BROMIDE AND ALBUTEROL SULFATE 2.5; .5 MG/3ML; MG/3ML
3 SOLUTION RESPIRATORY (INHALATION) EVERY 6 HOURS PRN
Qty: 360 ML | Refills: 2 | Status: ON HOLD | OUTPATIENT
Start: 2019-08-01 | End: 2020-06-11 | Stop reason: SDUPTHER

## 2019-08-01 RX ORDER — AMOXICILLIN 250 MG
1 CAPSULE ORAL 2 TIMES DAILY PRN
COMMUNITY
Start: 2019-08-01

## 2019-08-01 RX ORDER — IPRATROPIUM BROMIDE AND ALBUTEROL SULFATE 2.5; .5 MG/3ML; MG/3ML
3 SOLUTION RESPIRATORY (INHALATION)
Status: CANCELLED | OUTPATIENT
Start: 2019-08-01

## 2019-08-01 RX ORDER — PREDNISONE 20 MG/1
40 TABLET ORAL DAILY
Qty: 6 TABLET | Refills: 0 | Status: SHIPPED | OUTPATIENT
Start: 2019-08-02 | End: 2019-08-02

## 2019-08-01 RX ORDER — AZITHROMYCIN 250 MG/1
250 TABLET, FILM COATED ORAL DAILY
Qty: 3 TABLET | Refills: 0 | Status: SHIPPED | OUTPATIENT
Start: 2019-08-02 | End: 2019-08-02

## 2019-08-01 RX ORDER — CARVEDILOL 12.5 MG/1
12.5 TABLET ORAL 2 TIMES DAILY WITH MEALS
Qty: 60 TABLET | Refills: 11
Start: 2019-08-01 | End: 2019-08-04 | Stop reason: SDUPTHER

## 2019-08-01 RX ORDER — CYCLOBENZAPRINE HCL 10 MG
10 TABLET ORAL 3 TIMES DAILY PRN
Status: ON HOLD
Start: 2019-08-01 | End: 2020-01-11 | Stop reason: CLARIF

## 2019-08-01 RX ORDER — RAMELTEON 8 MG/1
8 TABLET ORAL NIGHTLY PRN
Status: COMPLETED | OUTPATIENT
Start: 2019-08-01 | End: 2019-08-01

## 2019-08-01 RX ORDER — GUAIFENESIN 600 MG/1
600 TABLET, EXTENDED RELEASE ORAL 2 TIMES DAILY
Status: DISCONTINUED | OUTPATIENT
Start: 2019-08-01 | End: 2019-08-04 | Stop reason: HOSPADM

## 2019-08-01 RX ORDER — MAGNESIUM SULFATE HEPTAHYDRATE 40 MG/ML
2 INJECTION, SOLUTION INTRAVENOUS ONCE
Status: COMPLETED | OUTPATIENT
Start: 2019-08-01 | End: 2019-08-01

## 2019-08-01 RX ORDER — BUDESONIDE AND FORMOTEROL FUMARATE DIHYDRATE 80; 4.5 UG/1; UG/1
2 AEROSOL RESPIRATORY (INHALATION) 2 TIMES DAILY
Qty: 10.2 G | Refills: 3 | Status: ON HOLD | OUTPATIENT
Start: 2019-08-01 | End: 2020-06-08

## 2019-08-01 RX ORDER — ERGOCALCIFEROL 1.25 MG/1
50000 CAPSULE ORAL
Status: ON HOLD
Start: 2019-08-01 | End: 2020-01-11 | Stop reason: CLARIF

## 2019-08-01 RX ORDER — ALBUTEROL SULFATE 90 UG/1
2 AEROSOL, METERED RESPIRATORY (INHALATION) EVERY 6 HOURS PRN
Qty: 18 G | Refills: 2 | Status: SHIPPED | OUTPATIENT
Start: 2019-08-01

## 2019-08-01 RX ORDER — SODIUM CHLORIDE 0.9 % (FLUSH) 0.9 %
10 SYRINGE (ML) INJECTION
Status: DISCONTINUED | OUTPATIENT
Start: 2019-08-01 | End: 2019-08-04 | Stop reason: HOSPADM

## 2019-08-01 RX ADMIN — CARVEDILOL 12.5 MG: 12.5 TABLET, FILM COATED ORAL at 08:08

## 2019-08-01 RX ADMIN — MAGNESIUM SULFATE IN WATER 2 G: 40 INJECTION, SOLUTION INTRAVENOUS at 11:08

## 2019-08-01 RX ADMIN — ACETAMINOPHEN 1000 MG: 500 TABLET ORAL at 05:08

## 2019-08-01 RX ADMIN — IPRATROPIUM BROMIDE AND ALBUTEROL SULFATE 3 ML: .5; 3 SOLUTION RESPIRATORY (INHALATION) at 12:08

## 2019-08-01 RX ADMIN — IPRATROPIUM BROMIDE AND ALBUTEROL SULFATE 3 ML: .5; 3 SOLUTION RESPIRATORY (INHALATION) at 08:08

## 2019-08-01 RX ADMIN — FLUOXETINE 40 MG: 20 CAPSULE ORAL at 08:08

## 2019-08-01 RX ADMIN — ENOXAPARIN SODIUM 40 MG: 100 INJECTION SUBCUTANEOUS at 09:08

## 2019-08-01 RX ADMIN — HYDROMORPHONE HYDROCHLORIDE 1 MG: 1 INJECTION, SOLUTION INTRAMUSCULAR; INTRAVENOUS; SUBCUTANEOUS at 06:08

## 2019-08-01 RX ADMIN — CARVEDILOL 12.5 MG: 12.5 TABLET, FILM COATED ORAL at 06:08

## 2019-08-01 RX ADMIN — ENOXAPARIN SODIUM 40 MG: 100 INJECTION SUBCUTANEOUS at 08:08

## 2019-08-01 RX ADMIN — HYDROMORPHONE HYDROCHLORIDE 1 MG: 1 INJECTION, SOLUTION INTRAMUSCULAR; INTRAVENOUS; SUBCUTANEOUS at 09:08

## 2019-08-01 RX ADMIN — DIPHENHYDRAMINE HYDROCHLORIDE 25 MG: 25 CAPSULE ORAL at 12:08

## 2019-08-01 RX ADMIN — Medication 500 MG: at 08:08

## 2019-08-01 RX ADMIN — CARBAMAZEPINE 800 MG: 200 TABLET ORAL at 08:08

## 2019-08-01 RX ADMIN — HYDROMORPHONE HYDROCHLORIDE 1 MG: 1 INJECTION, SOLUTION INTRAMUSCULAR; INTRAVENOUS; SUBCUTANEOUS at 08:08

## 2019-08-01 RX ADMIN — AMLODIPINE BESYLATE 10 MG: 10 TABLET ORAL at 08:08

## 2019-08-01 RX ADMIN — HYDROMORPHONE HYDROCHLORIDE 1 MG: 1 INJECTION, SOLUTION INTRAMUSCULAR; INTRAVENOUS; SUBCUTANEOUS at 11:08

## 2019-08-01 RX ADMIN — FLUTICASONE PROPIONATE 100 MCG: 50 SPRAY, METERED NASAL at 08:08

## 2019-08-01 RX ADMIN — OXYCODONE HYDROCHLORIDE 15 MG: 10 TABLET ORAL at 01:08

## 2019-08-01 RX ADMIN — PREDNISONE 40 MG: 20 TABLET ORAL at 08:08

## 2019-08-01 RX ADMIN — HYDROMORPHONE HYDROCHLORIDE 1 MG: 1 INJECTION, SOLUTION INTRAMUSCULAR; INTRAVENOUS; SUBCUTANEOUS at 05:08

## 2019-08-01 RX ADMIN — OXYCODONE HYDROCHLORIDE 15 MG: 10 TABLET ORAL at 04:08

## 2019-08-01 RX ADMIN — GUAIFENESIN 600 MG: 600 TABLET, EXTENDED RELEASE ORAL at 09:08

## 2019-08-01 RX ADMIN — HYDROMORPHONE HYDROCHLORIDE 1 MG: 1 INJECTION, SOLUTION INTRAMUSCULAR; INTRAVENOUS; SUBCUTANEOUS at 03:08

## 2019-08-01 RX ADMIN — POLYETHYLENE GLYCOL 3350 17 G: 17 POWDER, FOR SOLUTION ORAL at 08:08

## 2019-08-01 RX ADMIN — AZITHROMYCIN 500 MG: 250 TABLET, FILM COATED ORAL at 08:08

## 2019-08-01 RX ADMIN — IPRATROPIUM BROMIDE AND ALBUTEROL SULFATE 3 ML: .5; 3 SOLUTION RESPIRATORY (INHALATION) at 04:08

## 2019-08-01 RX ADMIN — FLUTICASONE FUROATE AND VILANTEROL TRIFENATATE 1 PUFF: 100; 25 POWDER RESPIRATORY (INHALATION) at 08:08

## 2019-08-01 RX ADMIN — IPRATROPIUM BROMIDE AND ALBUTEROL SULFATE 3 ML: .5; 3 SOLUTION RESPIRATORY (INHALATION) at 09:08

## 2019-08-01 RX ADMIN — SENNOSIDES,DOCUSATE SODIUM 1 TABLET: 8.6; 5 TABLET, FILM COATED ORAL at 08:08

## 2019-08-01 RX ADMIN — HYDROMORPHONE HYDROCHLORIDE 1 MG: 1 INJECTION, SOLUTION INTRAMUSCULAR; INTRAVENOUS; SUBCUTANEOUS at 02:08

## 2019-08-01 RX ADMIN — IPRATROPIUM BROMIDE AND ALBUTEROL SULFATE 3 ML: .5; 3 SOLUTION RESPIRATORY (INHALATION) at 03:08

## 2019-08-01 RX ADMIN — GABAPENTIN 300 MG: 100 CAPSULE ORAL at 09:08

## 2019-08-01 RX ADMIN — DIPHENHYDRAMINE HYDROCHLORIDE 25 MG: 25 CAPSULE ORAL at 08:08

## 2019-08-01 RX ADMIN — GUAIFENESIN 600 MG: 600 TABLET, EXTENDED RELEASE ORAL at 11:08

## 2019-08-01 RX ADMIN — GABAPENTIN 300 MG: 100 CAPSULE ORAL at 08:08

## 2019-08-01 RX ADMIN — ACETAMINOPHEN 1000 MG: 500 TABLET ORAL at 01:08

## 2019-08-01 RX ADMIN — CARBAMAZEPINE 800 MG: 200 TABLET ORAL at 09:08

## 2019-08-01 RX ADMIN — RAMELTEON 8 MG: 8 TABLET, FILM COATED ORAL at 12:08

## 2019-08-01 NOTE — ASSESSMENT & PLAN NOTE
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     Plan:  -oxygen (hold fluids because of interstitial edema on CXR)  -pain control with tylenol and prn oxycodone and hydromorphone

## 2019-08-01 NOTE — PROGRESS NOTES
[facility] Hospital Medicine  Progress Note    Patient Name: Nazanin Malone  MRN: [id number]  Patient Class: IP- Inpatient   Admission Date: 7/30/2019  Length of Stay: 2 days  Attending Physician: Binu Ferro MD  Primary Care Provider: Balta Forbes MD    [facility] Medicine Team: [facility] MED 1 KANA Perkins    Subjective:     Principal Problem:Moderate asthma with exacerbation      HPI:  Ms. Malone is a 39-year-old lady with PMH sickle cell, beta thalassemia, HTN, and asthma who presents with worsening SOB x 2 days after seeing her niece over the weekend who had URI symptoms. The dyspnea is worse when lying down and with exertion. She can only walk about 20 feet without becoming short of breath. She has an associated cough productive of greenish sputum on the morning of her admission but has since become clear. She also reports wheezing. Her home nebulizer has not relieved her symptoms. She reports diffuse chest heaviness since the night prior to admission and pain in left arm and her lower legs unrelieved by percocet at home. She describes a crackling she hears in her throat when she inspires that she has never heard before. ROS positive for headache, nasal congestion, numbness and tingling in her left leg for 2 weeks, dry eyes for 2 weeks, and chills.     She reports experiencing 1-3 vasooclusive crises per year that have required ED presentation and admission. She has had acute chest syndrome 3 times (1 year ago, 15 years ago, and once in between). She has also been admitted for pneumonia multiple times, reporting that whenever she is around sick contacts, she tends to develop pneumonia. She reports that her symptoms currently feel most similar to previous episodes of pneumonia and not acute chest syndrome. Her most recent admission for pneumonia was on 4/25/19, which resolved with 10 day course of Levaquin. She has a remote smoking history (stopped 20 years ago), and denies  illicit drug use.    Overview/Hospital Course:  7/30 - admitted to hospital medicine for SOB  7/31 - started azithromycin and prednisone   8/1- patient echo unremarkable for HF and PH, still complaining of SOB, pain improving.    Interval History: Ms. Malone feels about the same today with regard to her SOB. She denies any chest pain, but still reports diffuse chest tightness. On 2L NC.    Review of Systems   Constitutional: Positive for activity change and chills. Negative for fever and unexpected weight change.   HENT: Positive for congestion. Negative for hearing loss, sore throat, tinnitus and trouble swallowing.    Eyes: Negative for pain and visual disturbance.   Respiratory: Positive for cough, chest tightness and shortness of breath.    Cardiovascular: Negative for chest pain and leg swelling.   Gastrointestinal: Negative for abdominal pain, blood in stool, nausea and vomiting.   Endocrine: Negative for cold intolerance and heat intolerance.   Genitourinary: Negative for decreased urine volume and difficulty urinating.   Musculoskeletal: Positive for back pain and myalgias.        Bilateral leg pain, left arm pain   Skin: Negative for color change and rash.   Neurological: Positive for numbness and headaches.     Objective:     Vital Signs (Most Recent):  Temp: 98.7 °F (37.1 °C) (07/31/19 1600)  Pulse: 84 (07/31/19 1603)  Resp: 18 (07/31/19 1603)  BP: (!) 146/80 (07/31/19 1600)  SpO2: (!) 93 % (07/31/19 1603) Vital Signs (24h Range):  Temp:  [97.5 °F (36.4 °C)-98.7 °F (37.1 °C)] 98.7 °F (37.1 °C)  Pulse:  [79-95] 84  Resp:  [16-20] 18  SpO2:  [93 %-100 %] 93 %  BP: (129-178)/(65-98) 146/80     Weight: 136.1 kg (300 lb)  Body mass index is 54.87 kg/m².    Intake/Output Summary (Last 24 hours) at 7/31/2019 2027  Last data filed at 7/31/2019 1812  Gross per 24 hour   Intake 1250 ml   Output --   Net 1250 ml      Physical Exam   Constitutional: She appears well-developed and well-nourished. No distress.    HENT:   Head: Normocephalic.   Eyes: Pupils are equal, round, and reactive to light. Conjunctivae and EOM are normal. No scleral icterus.   Neck: Normal range of motion. Neck supple.   Cardiovascular: Normal rate, regular rhythm and normal heart sounds.   Pulmonary/Chest:   Bilaterally (much improved)   Abdominal: Soft. Bowel sounds are normal. She exhibits no distension. There is no tenderness.   Musculoskeletal: She exhibits no edema or tenderness.   Skin: Skin is warm and dry. No rash noted. She is not diaphoretic. No erythema.       Significant Labs: All pertinent labs within the past 24 hours have been reviewed.    Significant Imaging: I have reviewed and interpreted all pertinent imaging results/findings within the past 24 hours.          Assessment/Plan:      * Moderate asthma with exacerbation  Patient with progressive dyspnea, diffuse wheezes on physical exam, and CXR with interstitial edema. BNP WNL. Differential diagnosis includes cardiogenic pulmonary edema and noncardiogenic pulmonary edema (viral vs. bacterial pneumonia, asthma exacerbation, acute chest syndrome, ARDS). Patient not on hydroxyurea at home.     Plan:  - TTE unremarkable for HF and PH  - supplemental oxygen  - end tidal CO2  - monitor oxygen saturation   - duo-nebs  - SYMBICORT  - incentive spirometry   - prednisone 40 and azithromycin 500 x 5 days     Hb-SS disease without crisis  History of sickle cell and beta thalassemia with previous vasooclusive crises.     Plan:  -oxygen (hold fluids because of interstitial edema on CXR)  -pain control with tylenol and prn oxycodone and hydromorphone      Trigeminal neuralgia  - continue home carbamazepine 12.5 BID PO      Benign essential HTN  - continue home amlodipine 10 and carvedilol 12.5 BID      VTE Risk Mitigation (From admission, onward)        Ordered     IP VTE HIGH RISK PATIENT  Once      08/01/19 0938     Place JOSE LUIS hose  Until discontinued      08/01/19 0938     enoxaparin injection  40 mg  Every 12 hours      07/30/19 2112                KANA Perkins  Department of Hospital Medicine   [facility]

## 2019-08-01 NOTE — SUBJECTIVE & OBJECTIVE
Interval History: Ms. Malone feels about the same today with regard to her SOB. She denies any chest pain, but still reports diffuse chest tightness. On 2L NC.    Review of Systems   Constitutional: Positive for activity change and chills. Negative for fever and unexpected weight change.   HENT: Positive for congestion. Negative for hearing loss, sore throat, tinnitus and trouble swallowing.    Eyes: Negative for pain and visual disturbance.   Respiratory: Positive for cough, chest tightness and shortness of breath.    Cardiovascular: Negative for chest pain and leg swelling.   Gastrointestinal: Negative for abdominal pain, blood in stool, nausea and vomiting.   Endocrine: Negative for cold intolerance and heat intolerance.   Genitourinary: Negative for decreased urine volume and difficulty urinating.   Musculoskeletal: Positive for back pain and myalgias.        Bilateral leg pain, left arm pain   Skin: Negative for color change and rash.   Neurological: Positive for numbness and headaches.     Objective:     Vital Signs (Most Recent):  Temp: 98.7 °F (37.1 °C) (07/31/19 1600)  Pulse: 84 (07/31/19 1603)  Resp: 18 (07/31/19 1603)  BP: (!) 146/80 (07/31/19 1600)  SpO2: (!) 93 % (07/31/19 1603) Vital Signs (24h Range):  Temp:  [97.5 °F (36.4 °C)-98.7 °F (37.1 °C)] 98.7 °F (37.1 °C)  Pulse:  [79-95] 84  Resp:  [16-20] 18  SpO2:  [93 %-100 %] 93 %  BP: (129-178)/(65-98) 146/80     Weight: 136.1 kg (300 lb)  Body mass index is 54.87 kg/m².    Intake/Output Summary (Last 24 hours) at 7/31/2019 2027  Last data filed at 7/31/2019 1812  Gross per 24 hour   Intake 1250 ml   Output --   Net 1250 ml      Physical Exam   Constitutional: She appears well-developed and well-nourished. No distress.   HENT:   Head: Normocephalic.   Eyes: Pupils are equal, round, and reactive to light. Conjunctivae and EOM are normal. No scleral icterus.   Neck: Normal range of motion. Neck supple.   Cardiovascular: Normal rate, regular rhythm and  normal heart sounds.   Pulmonary/Chest:   Bilaterally (much improved)   Abdominal: Soft. Bowel sounds are normal. She exhibits no distension. There is no tenderness.   Musculoskeletal: She exhibits no edema or tenderness.   Skin: Skin is warm and dry. No rash noted. She is not diaphoretic. No erythema.       Significant Labs: All pertinent labs within the past 24 hours have been reviewed.    Significant Imaging: I have reviewed and interpreted all pertinent imaging results/findings within the past 24 hours.

## 2019-08-01 NOTE — PROGRESS NOTES
[facility] Hospital Medicine  Progress Note    Patient Name: Nazanin Malone  MRN: [id number]  Patient Class: IP- Inpatient   Admission Date: 7/30/2019  Length of Stay: 1 days  Attending Physician: Binu Ferro MD  Primary Care Provider: Balta Forbes MD    [facility] Medicine Team: [facility]OSP MED 1 Bridget Mott MD    Subjective:     Principal Problem:<principal problem not specified>      HPI:  Ms. Malone is a 39-year-old lady with PMH sickle cell, beta thalassemia, HTN, and asthma who presents with worsening SOB x 2 days after seeing her niece over the weekend who had URI symptoms. The dyspnea is worse when lying down and with exertion. She can only walk about 20 feet without becoming short of breath. She has an associated cough productive of greenish sputum on the morning of her admission but has since become clear. She also reports wheezing. Her home nebulizer has not relieved her symptoms. She reports diffuse chest heaviness since the night prior to admission and pain in left arm and her lower legs unrelieved by percocet at home. She describes a crackling she hears in her throat when she inspires that she has never heard before. ROS positive for headache, nasal congestion, numbness and tingling in her left leg for 2 weeks, dry eyes for 2 weeks, and chills.     She reports experiencing 1-3 vasooclusive crises per year that have required ED presentation and admission. She has had acute chest syndrome 3 times (1 year ago, 15 years ago, and once in between). She has also been admitted for pneumonia multiple times, reporting that whenever she is around sick contacts, she tends to develop pneumonia. She reports that her symptoms currently feel most similar to previous episodes of pneumonia and not acute chest syndrome. Her most recent admission for pneumonia was on 4/25/19, which resolved with 10 day course of Levaquin. She has a remote smoking history (stopped 20 years ago), and denies  illicit drug use.    Overview/Hospital Course:  7/30 - admitted to hospital medicine for SOB  7/31 - started azithromycin and prednisone     Interval History: Ms. Malone feels about the same today with regard to her SOB. She denies any chest pain, but still reports diffuse chest tightness. On 2L NC.    Review of Systems   Constitutional: Positive for activity change and chills. Negative for fever and unexpected weight change.   HENT: Positive for congestion. Negative for hearing loss, sore throat, tinnitus and trouble swallowing.    Eyes: Negative for pain and visual disturbance.   Respiratory: Positive for cough, chest tightness and shortness of breath.    Cardiovascular: Negative for chest pain and leg swelling.   Gastrointestinal: Negative for abdominal pain, blood in stool, nausea and vomiting.   Endocrine: Negative for cold intolerance and heat intolerance.   Genitourinary: Negative for decreased urine volume and difficulty urinating.   Musculoskeletal: Positive for back pain and myalgias.        Bilateral leg pain, left arm pain   Skin: Negative for color change and rash.   Neurological: Positive for numbness and headaches.     Objective:     Vital Signs (Most Recent):  Temp: 98.7 °F (37.1 °C) (07/31/19 1600)  Pulse: 84 (07/31/19 1603)  Resp: 18 (07/31/19 1603)  BP: (!) 146/80 (07/31/19 1600)  SpO2: (!) 93 % (07/31/19 1603) Vital Signs (24h Range):  Temp:  [97.5 °F (36.4 °C)-98.7 °F (37.1 °C)] 98.7 °F (37.1 °C)  Pulse:  [79-95] 84  Resp:  [16-20] 18  SpO2:  [93 %-100 %] 93 %  BP: (129-178)/(65-98) 146/80     Weight: 136.1 kg (300 lb)  Body mass index is 54.87 kg/m².    Intake/Output Summary (Last 24 hours) at 7/31/2019 2027  Last data filed at 7/31/2019 1812  Gross per 24 hour   Intake 1250 ml   Output --   Net 1250 ml      Physical Exam   Constitutional: She appears well-developed and well-nourished. No distress.   HENT:   Head: Normocephalic.   Eyes: Pupils are equal, round, and reactive to light.  Conjunctivae and EOM are normal. No scleral icterus.   Neck: Normal range of motion. Neck supple.   Cardiovascular: Normal rate, regular rhythm and normal heart sounds.   Pulmonary/Chest: She has wheezes.   Bilaterally (much improved)   Abdominal: Soft. Bowel sounds are normal. She exhibits no distension. There is no tenderness.   Musculoskeletal: She exhibits no edema or tenderness.   Skin: Skin is warm and dry. No rash noted. She is not diaphoretic. No erythema.       Significant Labs: All pertinent labs within the past 24 hours have been reviewed.    Significant Imaging: I have reviewed and interpreted all pertinent imaging results/findings within the past 24 hours.      Assessment/Plan:      Respiratory failure, acute  Patient with progressive dyspnea, diffuse wheezes on physical exam, and CXR with interstitial edema. BNP WNL. Differential diagnosis includes cardiogenic pulmonary edema and noncardiogenic pulmonary edema (viral vs. bacterial pneumonia, asthma exacerbation, acute chest syndrome, ARDS). Patient not on hydroxyurea at home.     Plan:  - TTE pending to r/o pulmonary HTN  - supplemental oxygen  - end tidal CO2  - monitor oxygen saturation   - duo-nebs  - prednisone 40 and azithromycin 500 x 5 days     Sickle cell pain crisis  History of sickle cell and beta thalassemia with previous vasooclusive crises.     Plan:  -oxygen (hold fluids because of interstitial edema on CXR)  -pain control with tylenol and prn oxycodone and hydromorphone      Intermittent asthma  - continue home albuterol      Benign essential HTN  - continue home amlodipine 10 and carvedilol 12.5 BID      Trigeminal neuralgia  - continue home carbamazepine 12.5 BID PO        VTE Risk Mitigation (From admission, onward)        Ordered     enoxaparin injection 40 mg  Every 12 hours      07/30/19 2112     IP VTE HIGH RISK PATIENT  Once      07/30/19 1837                Bridget Mott MD  Department of Hospital Medicine   [facility]  [place]

## 2019-08-01 NOTE — ASSESSMENT & PLAN NOTE
Patient with progressive dyspnea, diffuse wheezes on physical exam, and CXR with interstitial edema. BNP WNL. Differential diagnosis includes cardiogenic pulmonary edema and noncardiogenic pulmonary edema (viral vs. bacterial pneumonia, asthma exacerbation, acute chest syndrome, ARDS). Patient not on hydroxyurea at home.     Plan:  - TTE unremarkable for HF and PH  - supplemental oxygen  - end tidal CO2  - monitor oxygen saturation   - duo-nebs  - SYMBICORT  - incentive spirometry   - prednisone 40 and azithromycin 500 x 5 days

## 2019-08-01 NOTE — ASSESSMENT & PLAN NOTE
Patient with progressive dyspnea, diffuse wheezes on physical exam, and CXR with interstitial edema. BNP WNL. Differential diagnosis includes cardiogenic pulmonary edema and noncardiogenic pulmonary edema (viral vs. bacterial pneumonia, asthma exacerbation, acute chest syndrome, ARDS). Patient not on hydroxyurea at home.     Plan:  - TTE pending to r/o pulmonary HTN  - supplemental oxygen  - end tidal CO2  - monitor oxygen saturation   - duo-nebs  - prednisone 40 and azithromycin 500 x 5 days

## 2019-08-01 NOTE — PLAN OF CARE
Problem: Adult Inpatient Plan of Care  Goal: Plan of Care Review  Outcome: Ongoing (interventions implemented as appropriate)  Pt awake and alert c/o trouble sleeping new order for ramelteon given pt still did not rest c/o of constant pain prn Dilaudid and oxycodone administered heat pack given for c/o lower abdominal pain VS stable no adverse reaction to medications bed in lowest position call light within reach safety maintained during shift.

## 2019-08-01 NOTE — PHYSICIAN QUERY
PT Name: Nazanin Malone  MR #: [id number]    Physician Query Form - Respiratory Condition Clarification      CDS/: Deb Hubbard               Contact information: [email or web address]    This form is a permanent document in the medical record.    Query Date: August 1, 2019    By submitting this query, we are merely seeking further clarification of documentation. Please utilize your independent clinical judgment when addressing the question(s) below.    The Medical record contains the following   Indicators   Supporting Clinical Findings Location in Medical Record   x   SOB, ALLEN, Wheezing, Productive Cough, Use of Accessory Muscles, etc. SOB H&P   x   Acute/Chronic Illness Intermittent asthma,HTN,Sickle cell HM pn 7-31      Radiology Findings     x   Respiratory Distress or Failure Respiratory failure, acute    HM pn 7-31      Hypoxia or Hypercapnia     x   RR         ABGs         O2 sat RR=2lnc  O2 sat=90-97% Vs record 7-30 to 7-31      BiPAP/Intubation     x   Supplemental O2 2lnc Vs record 7-30 to 7-31      Home O2, Oxygen Dependence        Treatment     x   Other Ms. Malone is a 39-year-old lady with PMH sickle  cell, beta thalassemia, HTN, and asthma who presents with worsening SOB x 2 days after seeing her niece over the weekend who had URI symptoms. The dyspnea is worse when lying down and with exertion   H&P     Respiratory failure can be acute, chronic or both. It is generally further specified as hypoxic, hypercapnic or both. Lastly, it is important to identify an etiology, if known or suspected.   References::  https://www.acphospitalist.org/archives/2013/10/coding.htm http://Ground[employer].com/acute-respiratory-failure-know     The clinical guidelines noted below are only system guidelines, and do not replace the providers clinical judgment.    Provider, please specify diagnosis or diagnoses associated with above clinical findings.       Based on the signs and symptoms,Please  further specify the respiratory condtion.      [   ] Acute Respiratory Insufficiency - Generally describes less severe respiratory symptoms and measurements (pO2, SpO2, pH, and pCO2) NOT meeting criteria for respiratory failure     [ x ] Other Respiratory Diagnosis (please specify): ________acute asthma exacerbation_________________________   [  ]  Clinically Undetermined       Please document in your progress notes daily for the duration of treatment until resolved and include in your discharge summary.

## 2019-08-02 ENCOUNTER — ANESTHESIA (OUTPATIENT)
Dept: MEDSURG UNIT | Facility: HOSPITAL | Age: 39
DRG: 202 | End: 2019-08-02
Payer: MEDICAID

## 2019-08-02 ENCOUNTER — ANESTHESIA EVENT (OUTPATIENT)
Dept: MEDSURG UNIT | Facility: HOSPITAL | Age: 39
DRG: 202 | End: 2019-08-02
Payer: MEDICAID

## 2019-08-02 PROBLEM — J45.901 MODERATE ASTHMA WITH EXACERBATION: Status: RESOLVED | Noted: 2017-04-25 | Resolved: 2019-08-02

## 2019-08-02 LAB
ALBUMIN SERPL BCP-MCNC: 3.4 G/DL (ref 3.5–5.2)
ALP SERPL-CCNC: 89 U/L (ref 55–135)
ALT SERPL W/O P-5'-P-CCNC: 14 U/L (ref 10–44)
ANION GAP SERPL CALC-SCNC: 7 MMOL/L (ref 8–16)
AST SERPL-CCNC: 17 U/L (ref 10–40)
BASOPHILS # BLD AUTO: 0.03 K/UL (ref 0–0.2)
BASOPHILS NFR BLD: 0.3 % (ref 0–1.9)
BILIRUB SERPL-MCNC: 0.3 MG/DL (ref 0.1–1)
BUN SERPL-MCNC: 10 MG/DL (ref 6–20)
CALCIUM SERPL-MCNC: 8.5 MG/DL (ref 8.7–10.5)
CHLORIDE SERPL-SCNC: 106 MMOL/L (ref 95–110)
CO2 SERPL-SCNC: 27 MMOL/L (ref 23–29)
CREAT SERPL-MCNC: 0.8 MG/DL (ref 0.5–1.4)
DIFFERENTIAL METHOD: ABNORMAL
EOSINOPHIL # BLD AUTO: 0.1 K/UL (ref 0–0.5)
EOSINOPHIL NFR BLD: 1.3 % (ref 0–8)
ERYTHROCYTE [DISTWIDTH] IN BLOOD BY AUTOMATED COUNT: 18.4 % (ref 11.5–14.5)
EST. GFR  (AFRICAN AMERICAN): >60 ML/MIN/1.73 M^2
EST. GFR  (NON AFRICAN AMERICAN): >60 ML/MIN/1.73 M^2
GLUCOSE SERPL-MCNC: 92 MG/DL (ref 70–110)
HCT VFR BLD AUTO: 30.8 % (ref 37–48.5)
HGB BLD-MCNC: 9.8 G/DL (ref 12–16)
IMM GRANULOCYTES # BLD AUTO: 0.06 K/UL (ref 0–0.04)
IMM GRANULOCYTES NFR BLD AUTO: 0.7 % (ref 0–0.5)
LYMPHOCYTES # BLD AUTO: 3.8 K/UL (ref 1–4.8)
LYMPHOCYTES NFR BLD: 43.5 % (ref 18–48)
MAGNESIUM SERPL-MCNC: 2.3 MG/DL (ref 1.6–2.6)
MCH RBC QN AUTO: 21.5 PG (ref 27–31)
MCHC RBC AUTO-ENTMCNC: 31.8 G/DL (ref 32–36)
MCV RBC AUTO: 68 FL (ref 82–98)
MONOCYTES # BLD AUTO: 0.8 K/UL (ref 0.3–1)
MONOCYTES NFR BLD: 9.4 % (ref 4–15)
NEUTROPHILS # BLD AUTO: 3.9 K/UL (ref 1.8–7.7)
NEUTROPHILS NFR BLD: 44.8 % (ref 38–73)
NRBC BLD-RTO: 1 /100 WBC
PHOSPHATE SERPL-MCNC: 2.4 MG/DL (ref 2.7–4.5)
PLATELET # BLD AUTO: 216 K/UL (ref 150–350)
PMV BLD AUTO: 9.2 FL (ref 9.2–12.9)
POTASSIUM SERPL-SCNC: 3.7 MMOL/L (ref 3.5–5.1)
PROT SERPL-MCNC: 7.2 G/DL (ref 6–8.4)
RBC # BLD AUTO: 4.55 M/UL (ref 4–5.4)
SODIUM SERPL-SCNC: 140 MMOL/L (ref 136–145)
WBC # BLD AUTO: 8.65 K/UL (ref 3.9–12.7)

## 2019-08-02 PROCEDURE — 11000001 HC ACUTE MED/SURG PRIVATE ROOM

## 2019-08-02 PROCEDURE — 94640 AIRWAY INHALATION TREATMENT: CPT

## 2019-08-02 PROCEDURE — 99233 PR SUBSEQUENT HOSPITAL CARE,LEVL III: ICD-10-PCS | Mod: ,,,

## 2019-08-02 PROCEDURE — 80053 COMPREHEN METABOLIC PANEL: CPT

## 2019-08-02 PROCEDURE — 99900035 HC TECH TIME PER 15 MIN (STAT)

## 2019-08-02 PROCEDURE — 25000003 PHARM REV CODE 250: Performed by: STUDENT IN AN ORGANIZED HEALTH CARE EDUCATION/TRAINING PROGRAM

## 2019-08-02 PROCEDURE — 94761 N-INVAS EAR/PLS OXIMETRY MLT: CPT

## 2019-08-02 PROCEDURE — 99233 SBSQ HOSP IP/OBS HIGH 50: CPT | Mod: ,,,

## 2019-08-02 PROCEDURE — 63600175 PHARM REV CODE 636 W HCPCS: Performed by: STUDENT IN AN ORGANIZED HEALTH CARE EDUCATION/TRAINING PROGRAM

## 2019-08-02 PROCEDURE — 84100 ASSAY OF PHOSPHORUS: CPT

## 2019-08-02 PROCEDURE — 25000242 PHARM REV CODE 250 ALT 637 W/ HCPCS: Performed by: STUDENT IN AN ORGANIZED HEALTH CARE EDUCATION/TRAINING PROGRAM

## 2019-08-02 PROCEDURE — 85025 COMPLETE CBC W/AUTO DIFF WBC: CPT

## 2019-08-02 PROCEDURE — 36000 PLACE NEEDLE IN VEIN: CPT | Performed by: STUDENT IN AN ORGANIZED HEALTH CARE EDUCATION/TRAINING PROGRAM

## 2019-08-02 PROCEDURE — 83735 ASSAY OF MAGNESIUM: CPT

## 2019-08-02 PROCEDURE — 36415 COLL VENOUS BLD VENIPUNCTURE: CPT

## 2019-08-02 RX ORDER — AMOXICILLIN 250 MG
1 CAPSULE ORAL ONCE
Status: COMPLETED | OUTPATIENT
Start: 2019-08-02 | End: 2019-08-02

## 2019-08-02 RX ORDER — GUAIFENESIN 600 MG/1
600 TABLET, EXTENDED RELEASE ORAL ONCE
Status: COMPLETED | OUTPATIENT
Start: 2019-08-02 | End: 2019-08-02

## 2019-08-02 RX ORDER — PREDNISONE 20 MG/1
40 TABLET ORAL DAILY
Qty: 4 TABLET | Refills: 0 | Status: SHIPPED | OUTPATIENT
Start: 2019-08-02 | End: 2019-08-04 | Stop reason: HOSPADM

## 2019-08-02 RX ORDER — HYDROCHLOROTHIAZIDE 25 MG/1
25 TABLET ORAL DAILY
Status: DISCONTINUED | OUTPATIENT
Start: 2019-08-02 | End: 2019-08-03

## 2019-08-02 RX ORDER — HYDRALAZINE HYDROCHLORIDE 25 MG/1
25 TABLET, FILM COATED ORAL ONCE
Status: COMPLETED | OUTPATIENT
Start: 2019-08-02 | End: 2019-08-02

## 2019-08-02 RX ORDER — ADHESIVE BANDAGE
30 BANDAGE TOPICAL ONCE
Status: COMPLETED | OUTPATIENT
Start: 2019-08-02 | End: 2019-08-02

## 2019-08-02 RX ORDER — HYDROMORPHONE HYDROCHLORIDE 1 MG/ML
1 INJECTION, SOLUTION INTRAMUSCULAR; INTRAVENOUS; SUBCUTANEOUS EVERY 6 HOURS PRN
Status: DISCONTINUED | OUTPATIENT
Start: 2019-08-02 | End: 2019-08-03

## 2019-08-02 RX ORDER — AMOXICILLIN 250 MG
1 CAPSULE ORAL DAILY PRN
Status: CANCELLED | OUTPATIENT
Start: 2019-08-02

## 2019-08-02 RX ORDER — RAMELTEON 8 MG/1
8 TABLET ORAL NIGHTLY PRN
Status: DISCONTINUED | OUTPATIENT
Start: 2019-08-02 | End: 2019-08-04 | Stop reason: HOSPADM

## 2019-08-02 RX ORDER — GUAIFENESIN 600 MG/1
600 TABLET, EXTENDED RELEASE ORAL 2 TIMES DAILY
Qty: 20 TABLET | Refills: 0 | COMMUNITY
Start: 2019-08-02 | End: 2019-08-12

## 2019-08-02 RX ORDER — AZITHROMYCIN 250 MG/1
250 TABLET, FILM COATED ORAL DAILY
Qty: 2 TABLET | Refills: 0 | Status: SHIPPED | OUTPATIENT
Start: 2019-08-02 | End: 2019-08-04 | Stop reason: HOSPADM

## 2019-08-02 RX ADMIN — ENOXAPARIN SODIUM 40 MG: 100 INJECTION SUBCUTANEOUS at 08:08

## 2019-08-02 RX ADMIN — HYDROMORPHONE HYDROCHLORIDE 1 MG: 1 INJECTION, SOLUTION INTRAMUSCULAR; INTRAVENOUS; SUBCUTANEOUS at 01:08

## 2019-08-02 RX ADMIN — HYDRALAZINE HYDROCHLORIDE 25 MG: 25 TABLET, FILM COATED ORAL at 01:08

## 2019-08-02 RX ADMIN — CARVEDILOL 12.5 MG: 12.5 TABLET, FILM COATED ORAL at 03:08

## 2019-08-02 RX ADMIN — POLYETHYLENE GLYCOL 3350 17 G: 17 POWDER, FOR SOLUTION ORAL at 10:08

## 2019-08-02 RX ADMIN — HYDROCHLOROTHIAZIDE 25 MG: 25 TABLET ORAL at 10:08

## 2019-08-02 RX ADMIN — HYDROMORPHONE HYDROCHLORIDE 1 MG: 1 INJECTION, SOLUTION INTRAMUSCULAR; INTRAVENOUS; SUBCUTANEOUS at 10:08

## 2019-08-02 RX ADMIN — GUAIFENESIN 600 MG: 600 TABLET, EXTENDED RELEASE ORAL at 10:08

## 2019-08-02 RX ADMIN — GUAIFENESIN 600 MG: 600 TABLET, EXTENDED RELEASE ORAL at 08:08

## 2019-08-02 RX ADMIN — HYDROMORPHONE HYDROCHLORIDE 1 MG: 1 INJECTION, SOLUTION INTRAMUSCULAR; INTRAVENOUS; SUBCUTANEOUS at 07:08

## 2019-08-02 RX ADMIN — ACETAMINOPHEN 1000 MG: 500 TABLET ORAL at 01:08

## 2019-08-02 RX ADMIN — HYDROMORPHONE HYDROCHLORIDE 1 MG: 1 INJECTION, SOLUTION INTRAMUSCULAR; INTRAVENOUS; SUBCUTANEOUS at 04:08

## 2019-08-02 RX ADMIN — ACETAMINOPHEN 1000 MG: 500 TABLET ORAL at 09:08

## 2019-08-02 RX ADMIN — FLUTICASONE FUROATE AND VILANTEROL TRIFENATATE 1 PUFF: 100; 25 POWDER RESPIRATORY (INHALATION) at 10:08

## 2019-08-02 RX ADMIN — RAMELTEON 8 MG: 8 TABLET, FILM COATED ORAL at 08:08

## 2019-08-02 RX ADMIN — Medication 500 MG: at 10:08

## 2019-08-02 RX ADMIN — GUAIFENESIN 600 MG: 600 TABLET, EXTENDED RELEASE ORAL at 12:08

## 2019-08-02 RX ADMIN — CARBAMAZEPINE 800 MG: 200 TABLET ORAL at 10:08

## 2019-08-02 RX ADMIN — SENNOSIDES,DOCUSATE SODIUM 1 TABLET: 8.6; 5 TABLET, FILM COATED ORAL at 10:08

## 2019-08-02 RX ADMIN — IPRATROPIUM BROMIDE AND ALBUTEROL SULFATE 3 ML: .5; 3 SOLUTION RESPIRATORY (INHALATION) at 05:08

## 2019-08-02 RX ADMIN — AMLODIPINE BESYLATE 10 MG: 10 TABLET ORAL at 10:08

## 2019-08-02 RX ADMIN — SENNOSIDES,DOCUSATE SODIUM 1 TABLET: 8.6; 5 TABLET, FILM COATED ORAL at 12:08

## 2019-08-02 RX ADMIN — DIPHENHYDRAMINE HYDROCHLORIDE 25 MG: 25 CAPSULE ORAL at 01:08

## 2019-08-02 RX ADMIN — CARBAMAZEPINE 800 MG: 200 TABLET ORAL at 08:08

## 2019-08-02 RX ADMIN — ENOXAPARIN SODIUM 40 MG: 100 INJECTION SUBCUTANEOUS at 10:08

## 2019-08-02 RX ADMIN — MAGNESIUM HYDROXIDE 2400 MG: 400 SUSPENSION ORAL at 12:08

## 2019-08-02 RX ADMIN — IPRATROPIUM BROMIDE AND ALBUTEROL SULFATE 3 ML: .5; 3 SOLUTION RESPIRATORY (INHALATION) at 07:08

## 2019-08-02 RX ADMIN — AZITHROMYCIN 500 MG: 250 TABLET, FILM COATED ORAL at 10:08

## 2019-08-02 RX ADMIN — PREDNISONE 40 MG: 20 TABLET ORAL at 10:08

## 2019-08-02 RX ADMIN — OXYCODONE HYDROCHLORIDE 15 MG: 10 TABLET ORAL at 10:08

## 2019-08-02 RX ADMIN — FLUOXETINE 40 MG: 20 CAPSULE ORAL at 10:08

## 2019-08-02 RX ADMIN — IPRATROPIUM BROMIDE AND ALBUTEROL SULFATE 3 ML: .5; 3 SOLUTION RESPIRATORY (INHALATION) at 11:08

## 2019-08-02 RX ADMIN — GABAPENTIN 300 MG: 100 CAPSULE ORAL at 08:08

## 2019-08-02 RX ADMIN — OXYCODONE HYDROCHLORIDE 15 MG: 10 TABLET ORAL at 03:08

## 2019-08-02 RX ADMIN — IPRATROPIUM BROMIDE AND ALBUTEROL SULFATE 3 ML: .5; 3 SOLUTION RESPIRATORY (INHALATION) at 01:08

## 2019-08-02 RX ADMIN — GABAPENTIN 300 MG: 100 CAPSULE ORAL at 10:08

## 2019-08-02 RX ADMIN — CARVEDILOL 12.5 MG: 12.5 TABLET, FILM COATED ORAL at 10:08

## 2019-08-02 RX ADMIN — FLUTICASONE PROPIONATE 100 MCG: 50 SPRAY, METERED NASAL at 10:08

## 2019-08-02 NOTE — PLAN OF CARE
Plan is to discharge home with family.       08/02/19 1059   Discharge Reassessment   Assessment Type Discharge Planning Reassessment   Do you have any problems affording any of your prescribed medications? No   Discharge Plan A Home with family   Discharge Plan B Home   DME Needed Upon Discharge  none   Anticipated Discharge Disposition Home

## 2019-08-02 NOTE — PROGRESS NOTES
Pt vs taken at 1520 and bp was 192/108 and c/o of generalized pain 8/10. 1715 schedule po carvedilol given early at 1523 due to vy=104/108. IM-1 Dr. Bridget Mott notified.    Follow-up fw=894/102 manually, Dr. Mott notified. MD said if pt c/o pain, pt can get a dose of 1 mg prn IVp dilaudid early and pt can be discharge after. Pt was notified about MD orders. Pt notified nurse back and c/o of headache and requested for nurse to call respiratory therapist to give a breathing treatment and to call physician to request 2 mg of IV dilaudid and a dose of IV benedryl.     Dr. Mott notified of pt c/o and requests.

## 2019-08-02 NOTE — PROGRESS NOTES
MD cancelled dc order. However, pt's IV access was removed due to previous d/c order. This nurse and charge nurse attempted to start new IV access but unsuccessful.

## 2019-08-03 LAB
ALBUMIN SERPL BCP-MCNC: 3.5 G/DL (ref 3.5–5.2)
ALP SERPL-CCNC: 90 U/L (ref 55–135)
ALT SERPL W/O P-5'-P-CCNC: 15 U/L (ref 10–44)
ANION GAP SERPL CALC-SCNC: 8 MMOL/L (ref 8–16)
AST SERPL-CCNC: 19 U/L (ref 10–40)
BASOPHILS # BLD AUTO: 0.03 K/UL (ref 0–0.2)
BASOPHILS NFR BLD: 0.3 % (ref 0–1.9)
BILIRUB SERPL-MCNC: 0.3 MG/DL (ref 0.1–1)
BUN SERPL-MCNC: 11 MG/DL (ref 6–20)
CALCIUM SERPL-MCNC: 8.7 MG/DL (ref 8.7–10.5)
CHLORIDE SERPL-SCNC: 102 MMOL/L (ref 95–110)
CO2 SERPL-SCNC: 28 MMOL/L (ref 23–29)
CREAT SERPL-MCNC: 0.8 MG/DL (ref 0.5–1.4)
DIFFERENTIAL METHOD: ABNORMAL
EOSINOPHIL # BLD AUTO: 0.1 K/UL (ref 0–0.5)
EOSINOPHIL NFR BLD: 1 % (ref 0–8)
ERYTHROCYTE [DISTWIDTH] IN BLOOD BY AUTOMATED COUNT: 18.4 % (ref 11.5–14.5)
EST. GFR  (AFRICAN AMERICAN): >60 ML/MIN/1.73 M^2
EST. GFR  (NON AFRICAN AMERICAN): >60 ML/MIN/1.73 M^2
GLUCOSE SERPL-MCNC: 95 MG/DL (ref 70–110)
HCT VFR BLD AUTO: 32.1 % (ref 37–48.5)
HGB BLD-MCNC: 9.9 G/DL (ref 12–16)
IMM GRANULOCYTES # BLD AUTO: 0.08 K/UL (ref 0–0.04)
IMM GRANULOCYTES NFR BLD AUTO: 0.9 % (ref 0–0.5)
LYMPHOCYTES # BLD AUTO: 4.3 K/UL (ref 1–4.8)
LYMPHOCYTES NFR BLD: 48.5 % (ref 18–48)
MAGNESIUM SERPL-MCNC: 2.4 MG/DL (ref 1.6–2.6)
MCH RBC QN AUTO: 21.1 PG (ref 27–31)
MCHC RBC AUTO-ENTMCNC: 30.8 G/DL (ref 32–36)
MCV RBC AUTO: 68 FL (ref 82–98)
MONOCYTES # BLD AUTO: 0.7 K/UL (ref 0.3–1)
MONOCYTES NFR BLD: 7.6 % (ref 4–15)
NEUTROPHILS # BLD AUTO: 3.7 K/UL (ref 1.8–7.7)
NEUTROPHILS NFR BLD: 41.7 % (ref 38–73)
NRBC BLD-RTO: 2 /100 WBC
PHOSPHATE SERPL-MCNC: 3.1 MG/DL (ref 2.7–4.5)
PLATELET # BLD AUTO: 260 K/UL (ref 150–350)
PMV BLD AUTO: 9.3 FL (ref 9.2–12.9)
POTASSIUM SERPL-SCNC: 3.7 MMOL/L (ref 3.5–5.1)
PROT SERPL-MCNC: 7.6 G/DL (ref 6–8.4)
RBC # BLD AUTO: 4.7 M/UL (ref 4–5.4)
RETICS/RBC NFR AUTO: 2.2 % (ref 0.5–2.5)
SODIUM SERPL-SCNC: 138 MMOL/L (ref 136–145)
WBC # BLD AUTO: 8.81 K/UL (ref 3.9–12.7)

## 2019-08-03 PROCEDURE — 25000003 PHARM REV CODE 250: Performed by: STUDENT IN AN ORGANIZED HEALTH CARE EDUCATION/TRAINING PROGRAM

## 2019-08-03 PROCEDURE — 63600175 PHARM REV CODE 636 W HCPCS: Performed by: STUDENT IN AN ORGANIZED HEALTH CARE EDUCATION/TRAINING PROGRAM

## 2019-08-03 PROCEDURE — 85045 AUTOMATED RETICULOCYTE COUNT: CPT

## 2019-08-03 PROCEDURE — 83735 ASSAY OF MAGNESIUM: CPT

## 2019-08-03 PROCEDURE — 94640 AIRWAY INHALATION TREATMENT: CPT

## 2019-08-03 PROCEDURE — 85025 COMPLETE CBC W/AUTO DIFF WBC: CPT

## 2019-08-03 PROCEDURE — 99233 PR SUBSEQUENT HOSPITAL CARE,LEVL III: ICD-10-PCS | Mod: ,,,

## 2019-08-03 PROCEDURE — 36415 COLL VENOUS BLD VENIPUNCTURE: CPT

## 2019-08-03 PROCEDURE — 94761 N-INVAS EAR/PLS OXIMETRY MLT: CPT

## 2019-08-03 PROCEDURE — 11000001 HC ACUTE MED/SURG PRIVATE ROOM

## 2019-08-03 PROCEDURE — 63600175 PHARM REV CODE 636 W HCPCS

## 2019-08-03 PROCEDURE — 99233 SBSQ HOSP IP/OBS HIGH 50: CPT | Mod: ,,,

## 2019-08-03 PROCEDURE — 25000242 PHARM REV CODE 250 ALT 637 W/ HCPCS: Performed by: STUDENT IN AN ORGANIZED HEALTH CARE EDUCATION/TRAINING PROGRAM

## 2019-08-03 PROCEDURE — 84100 ASSAY OF PHOSPHORUS: CPT

## 2019-08-03 PROCEDURE — 80053 COMPREHEN METABOLIC PANEL: CPT

## 2019-08-03 RX ORDER — CARVEDILOL 25 MG/1
25 TABLET ORAL 2 TIMES DAILY WITH MEALS
Status: DISCONTINUED | OUTPATIENT
Start: 2019-08-03 | End: 2019-08-04 | Stop reason: HOSPADM

## 2019-08-03 RX ORDER — IPRATROPIUM BROMIDE AND ALBUTEROL SULFATE 2.5; .5 MG/3ML; MG/3ML
3 SOLUTION RESPIRATORY (INHALATION) EVERY 4 HOURS PRN
Status: DISCONTINUED | OUTPATIENT
Start: 2019-08-03 | End: 2019-08-04 | Stop reason: HOSPADM

## 2019-08-03 RX ORDER — POTASSIUM CHLORIDE 750 MG/1
30 CAPSULE, EXTENDED RELEASE ORAL ONCE
Status: COMPLETED | OUTPATIENT
Start: 2019-08-03 | End: 2019-08-03

## 2019-08-03 RX ORDER — SODIUM CHLORIDE 9 MG/ML
INJECTION, SOLUTION INTRAVENOUS CONTINUOUS
Status: DISCONTINUED | OUTPATIENT
Start: 2019-08-03 | End: 2019-08-04

## 2019-08-03 RX ORDER — OXYCODONE AND ACETAMINOPHEN 10; 325 MG/1; MG/1
1 TABLET ORAL EVERY 4 HOURS PRN
Status: DISCONTINUED | OUTPATIENT
Start: 2019-08-03 | End: 2019-08-03

## 2019-08-03 RX ORDER — OXYCODONE AND ACETAMINOPHEN 10; 325 MG/1; MG/1
1 TABLET ORAL EVERY 4 HOURS PRN
Status: DISCONTINUED | OUTPATIENT
Start: 2019-08-03 | End: 2019-08-04 | Stop reason: HOSPADM

## 2019-08-03 RX ORDER — HYDROMORPHONE HYDROCHLORIDE 1 MG/ML
1 INJECTION, SOLUTION INTRAMUSCULAR; INTRAVENOUS; SUBCUTANEOUS EVERY 6 HOURS PRN
Status: DISCONTINUED | OUTPATIENT
Start: 2019-08-03 | End: 2019-08-03

## 2019-08-03 RX ORDER — HYDROMORPHONE HYDROCHLORIDE 1 MG/ML
1 INJECTION, SOLUTION INTRAMUSCULAR; INTRAVENOUS; SUBCUTANEOUS EVERY 4 HOURS PRN
Status: DISCONTINUED | OUTPATIENT
Start: 2019-08-03 | End: 2019-08-04

## 2019-08-03 RX ADMIN — HYDROCHLOROTHIAZIDE 25 MG: 25 TABLET ORAL at 08:08

## 2019-08-03 RX ADMIN — HYDROMORPHONE HYDROCHLORIDE 1 MG: 1 INJECTION, SOLUTION INTRAMUSCULAR; INTRAVENOUS; SUBCUTANEOUS at 06:08

## 2019-08-03 RX ADMIN — AMLODIPINE BESYLATE 10 MG: 10 TABLET ORAL at 08:08

## 2019-08-03 RX ADMIN — FLUTICASONE FUROATE AND VILANTEROL TRIFENATATE 1 PUFF: 100; 25 POWDER RESPIRATORY (INHALATION) at 08:08

## 2019-08-03 RX ADMIN — HYDROMORPHONE HYDROCHLORIDE 1 MG: 1 INJECTION, SOLUTION INTRAMUSCULAR; INTRAVENOUS; SUBCUTANEOUS at 08:08

## 2019-08-03 RX ADMIN — DIPHENHYDRAMINE HYDROCHLORIDE 25 MG: 25 CAPSULE ORAL at 02:08

## 2019-08-03 RX ADMIN — CARBAMAZEPINE 800 MG: 200 TABLET ORAL at 10:08

## 2019-08-03 RX ADMIN — OXYCODONE HYDROCHLORIDE 15 MG: 10 TABLET ORAL at 04:08

## 2019-08-03 RX ADMIN — ACETAMINOPHEN 1000 MG: 500 TABLET ORAL at 05:08

## 2019-08-03 RX ADMIN — HYDROMORPHONE HYDROCHLORIDE 1 MG: 1 INJECTION, SOLUTION INTRAMUSCULAR; INTRAVENOUS; SUBCUTANEOUS at 02:08

## 2019-08-03 RX ADMIN — FLUOXETINE 40 MG: 20 CAPSULE ORAL at 08:08

## 2019-08-03 RX ADMIN — OXYCODONE HYDROCHLORIDE AND ACETAMINOPHEN 1 TABLET: 10; 325 TABLET ORAL at 11:08

## 2019-08-03 RX ADMIN — ENOXAPARIN SODIUM 40 MG: 100 INJECTION SUBCUTANEOUS at 08:08

## 2019-08-03 RX ADMIN — GABAPENTIN 300 MG: 100 CAPSULE ORAL at 08:08

## 2019-08-03 RX ADMIN — PREDNISONE 40 MG: 20 TABLET ORAL at 08:08

## 2019-08-03 RX ADMIN — OXYCODONE HYDROCHLORIDE AND ACETAMINOPHEN 1 TABLET: 10; 325 TABLET ORAL at 08:08

## 2019-08-03 RX ADMIN — OXYCODONE HYDROCHLORIDE 15 MG: 10 TABLET ORAL at 08:08

## 2019-08-03 RX ADMIN — DIPHENHYDRAMINE HYDROCHLORIDE 25 MG: 25 CAPSULE ORAL at 08:08

## 2019-08-03 RX ADMIN — FLUTICASONE PROPIONATE 100 MCG: 50 SPRAY, METERED NASAL at 08:08

## 2019-08-03 RX ADMIN — IPRATROPIUM BROMIDE AND ALBUTEROL SULFATE 3 ML: .5; 3 SOLUTION RESPIRATORY (INHALATION) at 09:08

## 2019-08-03 RX ADMIN — SODIUM CHLORIDE: 0.9 INJECTION, SOLUTION INTRAVENOUS at 10:08

## 2019-08-03 RX ADMIN — GUAIFENESIN 600 MG: 600 TABLET, EXTENDED RELEASE ORAL at 08:08

## 2019-08-03 RX ADMIN — AZITHROMYCIN 500 MG: 250 TABLET, FILM COATED ORAL at 08:08

## 2019-08-03 RX ADMIN — CARVEDILOL 25 MG: 25 TABLET, FILM COATED ORAL at 04:08

## 2019-08-03 RX ADMIN — POTASSIUM CHLORIDE 30 MEQ: 750 CAPSULE, EXTENDED RELEASE ORAL at 10:08

## 2019-08-03 RX ADMIN — CARBAMAZEPINE 800 MG: 200 TABLET ORAL at 09:08

## 2019-08-03 RX ADMIN — HYDROMORPHONE HYDROCHLORIDE 1 MG: 1 INJECTION, SOLUTION INTRAMUSCULAR; INTRAVENOUS; SUBCUTANEOUS at 01:08

## 2019-08-03 RX ADMIN — HYDROMORPHONE HYDROCHLORIDE 1 MG: 1 INJECTION, SOLUTION INTRAMUSCULAR; INTRAVENOUS; SUBCUTANEOUS at 10:08

## 2019-08-03 RX ADMIN — Medication 500 MG: at 08:08

## 2019-08-03 RX ADMIN — CARVEDILOL 12.5 MG: 12.5 TABLET, FILM COATED ORAL at 08:08

## 2019-08-03 RX ADMIN — RAMELTEON 8 MG: 8 TABLET, FILM COATED ORAL at 11:08

## 2019-08-03 RX ADMIN — IPRATROPIUM BROMIDE AND ALBUTEROL SULFATE 3 ML: .5; 3 SOLUTION RESPIRATORY (INHALATION) at 12:08

## 2019-08-03 RX ADMIN — OXYCODONE HYDROCHLORIDE AND ACETAMINOPHEN 1 TABLET: 10; 325 TABLET ORAL at 04:08

## 2019-08-03 RX ADMIN — POLYETHYLENE GLYCOL 3350 17 G: 17 POWDER, FOR SOLUTION ORAL at 08:08

## 2019-08-03 RX ADMIN — IPRATROPIUM BROMIDE AND ALBUTEROL SULFATE 3 ML: .5; 3 SOLUTION RESPIRATORY (INHALATION) at 03:08

## 2019-08-03 RX ADMIN — SENNOSIDES,DOCUSATE SODIUM 1 TABLET: 8.6; 5 TABLET, FILM COATED ORAL at 08:08

## 2019-08-03 NOTE — SUBJECTIVE & OBJECTIVE
"Interval History: Ms. Malone is tearful this morning, reporting 10/10 pain that began overnight in her entire right leg extending up to her groin. She reports that it feels like "sickle pain" and made it difficult for her to sleep. She denies SOB, CP, extremity swelling or erythema.     Review of Systems   Constitutional: Positive for activity change. Negative for fever and unexpected weight change.   HENT: Negative for hearing loss, sore throat, tinnitus and trouble swallowing.    Eyes: Negative for pain and visual disturbance.   Respiratory: Positive for cough and chest tightness.    Cardiovascular: Negative for chest pain and leg swelling.   Gastrointestinal: Negative for abdominal pain, blood in stool, nausea and vomiting.   Endocrine: Negative for cold intolerance and heat intolerance.   Genitourinary: Negative for decreased urine volume and difficulty urinating.   Musculoskeletal: Positive for back pain and myalgias.        Right leg pain   Skin: Negative for color change and rash.   Neurological: Negative for headaches.     Objective:     Vital Signs (Most Recent):  Temp: 98.4 °F (36.9 °C) (08/03/19 1115)  Pulse: 86 (08/03/19 1208)  Resp: 17 (08/03/19 1208)  BP: (!) 148/79 (08/03/19 1115)  SpO2: 97 % (08/03/19 1208) Vital Signs (24h Range):  Temp:  [98 °F (36.7 °C)-98.7 °F (37.1 °C)] 98.4 °F (36.9 °C)  Pulse:  [78-91] 86  Resp:  [16-20] 17  SpO2:  [93 %-99 %] 97 %  BP: (135-192)/() 148/79     Weight: (!) 137.1 kg (302 lb 4 oz)  Body mass index is 55.28 kg/m².  No intake or output data in the 24 hours ending 08/03/19 1316   Physical Exam   Constitutional: She appears well-developed and well-nourished. No distress.   HENT:   Head: Normocephalic.   Eyes: Pupils are equal, round, and reactive to light. Conjunctivae and EOM are normal. No scleral icterus.   Neck: Normal range of motion. Neck supple.   Cardiovascular: Normal rate, regular rhythm and normal heart sounds.   Pulmonary/Chest: Effort normal and " breath sounds normal.   Abdominal: Soft. Bowel sounds are normal. She exhibits no distension. There is no tenderness.   Musculoskeletal: She exhibits no edema or tenderness.   Skin: Skin is warm and dry. No rash noted. She is not diaphoretic. No erythema.     Significant Labs: All pertinent labs within the past 24 hours have been reviewed.    Significant Imaging: I have reviewed and interpreted all pertinent imaging results/findings within the past 24 hours.

## 2019-08-03 NOTE — ANESTHESIA PROCEDURE NOTES
Peripheral IV Insertion    Diagnosis: I87.9 Disorder of vein, unspecified.    Patient location during procedure: floor    Staffing  Authorizing Provider: Kayla Bacon MD  Performing Provider: aKyla Bacon MD    Peripheral IV Insertion  Skin Prep: alcohol swabs  Local Infiltration: none  Orientation: left  Location: forearm  Catheter Size: 22 G  Catheter placement by Ultrasound guidance. Heme positive aspiration all ports.  Vessel Caliber: small, patentInsertion Attempts: 2  Assessment  Dressing: secured with tape and tegaderm  Patient: Tolerated well  Line flushed easily.

## 2019-08-03 NOTE — PLAN OF CARE
Problem: Adult Inpatient Plan of Care  Goal: Plan of Care Review  Patient remained free of falls and injuries during shift.  Patient took medications without incident.  No other concerns noted.

## 2019-08-03 NOTE — SUBJECTIVE & OBJECTIVE
Interval History: Ms. Escalante continues to complain of diffuse chest heaviness that is the same. Her SOB has improved. She continues to cough, but complains of not being able to produce any sputum. Complaining of generalized pain, leg pain, and ROS + for headache 6/10 this evening. Denies CP, extremity swelling or erythema, or vision changes.     Review of Systems   Constitutional: Positive for activity change. Negative for fever and unexpected weight change.   HENT: Negative for hearing loss, sore throat, tinnitus and trouble swallowing.    Eyes: Negative for pain and visual disturbance.   Respiratory: Positive for cough and chest tightness.    Cardiovascular: Negative for chest pain and leg swelling.   Gastrointestinal: Negative for abdominal pain, blood in stool, nausea and vomiting.   Endocrine: Negative for cold intolerance and heat intolerance.   Genitourinary: Negative for decreased urine volume and difficulty urinating.   Musculoskeletal: Positive for back pain and myalgias.        Bilateral leg pain, left arm pain   Skin: Negative for color change and rash.   Neurological: Positive for headaches.     Objective:     Vital Signs (Most Recent):  Temp: 98.4 °F (36.9 °C) (08/02/19 1521)  Pulse: 85 (08/02/19 1902)  Resp: 20 (08/02/19 1902)  BP: (!) 174/102 (08/02/19 1557)  SpO2: (!) 94 % (08/02/19 1902) Vital Signs (24h Range):  Temp:  [97.8 °F (36.6 °C)-98.4 °F (36.9 °C)] 98.4 °F (36.9 °C)  Pulse:  [75-93] 85  Resp:  [16-20] 20  SpO2:  [91 %-99 %] 94 %  BP: (156-192)/() 174/102     Weight: (!) 137.1 kg (302 lb 4 oz)  Body mass index is 55.28 kg/m².    Intake/Output Summary (Last 24 hours) at 8/2/2019 1922  Last data filed at 8/2/2019 0600  Gross per 24 hour   Intake 240 ml   Output --   Net 240 ml      Physical Exam   Constitutional: She appears well-developed and well-nourished. No distress.   HENT:   Head: Normocephalic.   Eyes: Pupils are equal, round, and reactive to light. Conjunctivae and EOM are  normal. No scleral icterus.   Neck: Normal range of motion. Neck supple.   Cardiovascular: Normal rate, regular rhythm and normal heart sounds.   Pulmonary/Chest: Effort normal and breath sounds normal.   Abdominal: Soft. Bowel sounds are normal. She exhibits no distension. There is no tenderness.   Musculoskeletal: She exhibits no edema or tenderness.   Skin: Skin is warm and dry. No rash noted. She is not diaphoretic. No erythema.       Significant Labs: All pertinent labs within the past 24 hours have been reviewed.    Significant Imaging: I have reviewed and interpreted all pertinent imaging results/findings within the past 24 hours.

## 2019-08-03 NOTE — PROGRESS NOTES
[facility] Hospital Medicine  Progress Note    Patient Name: Nazanin Malone  MRN: [id number]  Patient Class: IP- Inpatient   Admission Date: 7/30/2019  Length of Stay: 4 days  Attending Physician: Ivan Cortez MD  Primary Care Provider: Balta Forbes MD    [facility] Medicine Team: [facility] 1 Bridget Mott MD    Subjective:     Principal Problem:Moderate asthma with exacerbation      HPI:  Ms. Malone is a 39-year-old lady with PMH sickle cell, beta thalassemia, HTN, and asthma who presents with worsening SOB x 2 days after seeing her niece over the weekend who had URI symptoms. The dyspnea is worse when lying down and with exertion. She can only walk about 20 feet without becoming short of breath. She has an associated cough productive of greenish sputum on the morning of her admission but has since become clear. She also reports wheezing. Her home nebulizer has not relieved her symptoms. She reports diffuse chest heaviness since the night prior to admission and pain in left arm and her lower legs unrelieved by percocet at home. She describes a crackling she hears in her throat when she inspires that she has never heard before. ROS positive for headache, nasal congestion, numbness and tingling in her left leg for 2 weeks, dry eyes for 2 weeks, and chills.     She reports experiencing 1-3 vasooclusive crises per year that have required ED presentation and admission. She has had acute chest syndrome 3 times (1 year ago, 15 years ago, and once in between). She has also been admitted for pneumonia multiple times, reporting that whenever she is around sick contacts, she tends to develop pneumonia. She reports that her symptoms currently feel most similar to previous episodes of pneumonia and not acute chest syndrome. Her most recent admission for pneumonia was on 4/25/19, which resolved with 10 day course of Levaquin. She has a remote smoking history (stopped 20 years ago), and denies  "illicit drug use.    Overview/Hospital Course:  7/30 - admitted to hospital medicine for SOB  7/31 - started azithromycin and prednisone   8/1- patient echo unremarkable for HF and PH, still complaining of SOB, pain improving.  8/2 - Patient satting 90s on RA. Complaining of pain. IV dilaudid PRN with likely discharge in the morning.  8/3 - Patient denies SOB but complaining of 10/10 diffuse left leg pain that feels "like a sickle crisis." Oxygen, duonebs PRN, decreasing oxycodone dose to home percocet  q4 PRN for moderate pain with IV dilaudid 1 mg q6 PRN for severe pain. IVF 75 mL/hr for 1 day.     Interval History: Ms. Malone is tearful this morning, reporting 10/10 pain that began overnight in her entire right leg extending up to her groin. She reports that it feels like "sickle pain" and made it difficult for her to sleep. She denies SOB, CP, extremity swelling or erythema.     Review of Systems   Constitutional: Positive for activity change. Negative for fever and unexpected weight change.   HENT: Negative for hearing loss, sore throat, tinnitus and trouble swallowing.    Eyes: Negative for pain and visual disturbance.   Respiratory: Positive for cough and chest tightness.    Cardiovascular: Negative for chest pain and leg swelling.   Gastrointestinal: Negative for abdominal pain, blood in stool, nausea and vomiting.   Endocrine: Negative for cold intolerance and heat intolerance.   Genitourinary: Negative for decreased urine volume and difficulty urinating.   Musculoskeletal: Positive for back pain and myalgias.        Right leg pain   Skin: Negative for color change and rash.   Neurological: Negative for headaches.     Objective:     Vital Signs (Most Recent):  Temp: 98.4 °F (36.9 °C) (08/03/19 1115)  Pulse: 86 (08/03/19 1208)  Resp: 17 (08/03/19 1208)  BP: (!) 148/79 (08/03/19 1115)  SpO2: 97 % (08/03/19 1208) Vital Signs (24h Range):  Temp:  [98 °F (36.7 °C)-98.7 °F (37.1 °C)] 98.4 °F (36.9 " °C)  Pulse:  [78-91] 86  Resp:  [16-20] 17  SpO2:  [93 %-99 %] 97 %  BP: (135-192)/() 148/79     Weight: (!) 137.1 kg (302 lb 4 oz)  Body mass index is 55.28 kg/m².  No intake or output data in the 24 hours ending 08/03/19 1316   Physical Exam   Constitutional: She appears well-developed and well-nourished. No distress.   HENT:   Head: Normocephalic.   Eyes: Pupils are equal, round, and reactive to light. Conjunctivae and EOM are normal. No scleral icterus.   Neck: Normal range of motion. Neck supple.   Cardiovascular: Normal rate, regular rhythm and normal heart sounds.   Pulmonary/Chest: Effort normal and breath sounds normal.   Abdominal: Soft. Bowel sounds are normal. She exhibits no distension. There is no tenderness.   Musculoskeletal: She exhibits no edema or tenderness.   Skin: Skin is warm and dry. No rash noted. She is not diaphoretic. No erythema.     Significant Labs: All pertinent labs within the past 24 hours have been reviewed.    Significant Imaging: I have reviewed and interpreted all pertinent imaging results/findings within the past 24 hours.      Assessment/Plan:      Hb-SS disease without crisis  History of sickle cell and beta thalassemia with previous vasooclusive crises.     Plan:  - pain control with home percocet  q4 PRN for moderate pain with hydromorphone 1 mg IV q6 PRN for severe pain  - IVF 75 mL/hr for one day (8/3 - 8/4)  - supplemental oxygen 2 L NC      Intermittent asthma with acute exacerbation  - continue home albuterol  - duonebs PRN q6      Benign essential HTN  - continue home amlodipine 10, and HCTZ 25 mg PO, increase home carvedilol 12.5 BID to 25 BID    Trigeminal neuralgia  - continue home carbamazepine 12.5 BID PO        VTE Risk Mitigation (From admission, onward)        Ordered     IP VTE HIGH RISK PATIENT  Once      08/01/19 0938     Place JOSE LUIS hose  Until discontinued      08/01/19 0938     enoxaparin injection 40 mg  Every 12 hours      07/30/19 3121                 Bridget Mott MD  Department of Hospital Medicine   [facility]

## 2019-08-03 NOTE — ASSESSMENT & PLAN NOTE
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     Plan:  - pain control with home percocet  q4 PRN for moderate pain with hydromorphone 1 mg IV q6 PRN for severe pain  - IVF 75 mL/hr for one day (8/3 - 8/4)  - supplemental oxygen 2 L NC

## 2019-08-03 NOTE — PROGRESS NOTES
[facility] Hospital Medicine  Progress Note    Patient Name: Nazanin Malone  MRN: [id number]  Patient Class: IP- Inpatient   Admission Date: 7/30/2019  Length of Stay: 3 days  Attending Physician: Ivan Cortez MD  Primary Care Provider: Balta Forbes MD    [facility] Medicine Team: [facility] [place] [facility] MED 1 Bridget Mott MD    Subjective:     Principal Problem:Moderate asthma with exacerbation      HPI:  Ms. Malone is a 39-year-old lady with PMH sickle cell, beta thalassemia, HTN, and asthma who presents with worsening SOB x 2 days after seeing her niece over the weekend who had URI symptoms. The dyspnea is worse when lying down and with exertion. She can only walk about 20 feet without becoming short of breath. She has an associated cough productive of greenish sputum on the morning of her admission but has since become clear. She also reports wheezing. Her home nebulizer has not relieved her symptoms. She reports diffuse chest heaviness since the night prior to admission and pain in left arm and her lower legs unrelieved by percocet at home. She describes a crackling she hears in her throat when she inspires that she has never heard before. ROS positive for headache, nasal congestion, numbness and tingling in her left leg for 2 weeks, dry eyes for 2 weeks, and chills.     She reports experiencing 1-3 vasooclusive crises per year that have required ED presentation and admission. She has had acute chest syndrome 3 times (1 year ago, 15 years ago, and once in between). She has also been admitted for pneumonia multiple times, reporting that whenever she is around sick contacts, she tends to develop pneumonia. She reports that her symptoms currently feel most similar to previous episodes of pneumonia and not acute chest syndrome. Her most recent admission for pneumonia was on 4/25/19, which resolved with 10 day course of Levaquin. She has a remote smoking history (stopped 20 years ago), and denies  illicit drug use.    Overview/Hospital Course:  7/30 - admitted to hospital medicine for SOB  7/31 - started azithromycin and prednisone   8/1- patient echo unremarkable for HF and PH, still complaining of SOB, pain improving.  8/2 - Patient satting 90s on RA. Complaining of pain. IV dilaudid PRN with likely discharge in the morning.    Interval History: Ms. Escalante continues to complain of diffuse chest heaviness that is the same. Her SOB has improved. She continues to cough, but complains of not being able to produce any sputum. Complaining of generalized pain, leg pain, and ROS + for headache 6/10 this evening. Denies CP, extremity swelling or erythema, or vision changes.     Review of Systems   Constitutional: Positive for activity change. Negative for fever and unexpected weight change.   HENT: Negative for hearing loss, sore throat, tinnitus and trouble swallowing.    Eyes: Negative for pain and visual disturbance.   Respiratory: Positive for cough and chest tightness.    Cardiovascular: Negative for chest pain and leg swelling.   Gastrointestinal: Negative for abdominal pain, blood in stool, nausea and vomiting.   Endocrine: Negative for cold intolerance and heat intolerance.   Genitourinary: Negative for decreased urine volume and difficulty urinating.   Musculoskeletal: Positive for back pain and myalgias.        Bilateral leg pain, left arm pain   Skin: Negative for color change and rash.   Neurological: Positive for headaches.     Objective:     Vital Signs (Most Recent):  Temp: 98.4 °F (36.9 °C) (08/02/19 1521)  Pulse: 85 (08/02/19 1902)  Resp: 20 (08/02/19 1902)  BP: (!) 174/102 (08/02/19 1557)  SpO2: (!) 94 % (08/02/19 1902) Vital Signs (24h Range):  Temp:  [97.8 °F (36.6 °C)-98.4 °F (36.9 °C)] 98.4 °F (36.9 °C)  Pulse:  [75-93] 85  Resp:  [16-20] 20  SpO2:  [91 %-99 %] 94 %  BP: (156-192)/() 174/102     Weight: (!) 137.1 kg (302 lb 4 oz)  Body mass index is 55.28 kg/m².    Intake/Output Summary  (Last 24 hours) at 8/2/2019 1922  Last data filed at 8/2/2019 0600  Gross per 24 hour   Intake 240 ml   Output --   Net 240 ml      Physical Exam   Constitutional: She appears well-developed and well-nourished. No distress.   HENT:   Head: Normocephalic.   Eyes: Pupils are equal, round, and reactive to light. Conjunctivae and EOM are normal. No scleral icterus.   Neck: Normal range of motion. Neck supple.   Cardiovascular: Normal rate, regular rhythm and normal heart sounds.   Pulmonary/Chest: Effort normal and breath sounds normal.   Abdominal: Soft. Bowel sounds are normal. She exhibits no distension. There is no tenderness.   Musculoskeletal: She exhibits no edema or tenderness.   Skin: Skin is warm and dry. No rash noted. She is not diaphoretic. No erythema.       Significant Labs: All pertinent labs within the past 24 hours have been reviewed.    Significant Imaging: I have reviewed and interpreted all pertinent imaging results/findings within the past 24 hours.      Assessment/Plan:      Hb-SS disease without crisis  History of sickle cell and beta thalassemia with previous vasooclusive crises.     Plan:  - pain control with tylenol and prn oxycodone and hydromorphone      Intermittent asthma with acute exacerbation  - continue home albuterol      Benign essential HTN  - continue home amlodipine 10, carvedilol 12.5 BID, and HCTZ 25 mg PO      Trigeminal neuralgia  - continue home carbamazepine 12.5 BID PO        VTE Risk Mitigation (From admission, onward)        Ordered     IP VTE HIGH RISK PATIENT  Once      08/01/19 0938     Place JOSE LUIS hose  Until discontinued      08/01/19 0938     enoxaparin injection 40 mg  Every 12 hours      07/30/19 2112                Bridget Mott MD  Department of Hospital Medicine   [facility]

## 2019-08-03 NOTE — PLAN OF CARE
Pt free of fall this shift. Pain assessed and pt c/o generalized pain radiated to legs; prn pain medication administered and pt verbalized mild relief of pain. Pt aaox4. Afebrile. Blood pressure is being monitor.

## 2019-08-03 NOTE — ASSESSMENT & PLAN NOTE
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     Plan:  - pain control with tylenol and prn oxycodone and hydromorphone

## 2019-08-04 VITALS
RESPIRATION RATE: 17 BRPM | OXYGEN SATURATION: 96 % | BODY MASS INDEX: 53.92 KG/M2 | HEART RATE: 91 BPM | TEMPERATURE: 98 F | WEIGHT: 293 LBS | DIASTOLIC BLOOD PRESSURE: 83 MMHG | SYSTOLIC BLOOD PRESSURE: 145 MMHG | HEIGHT: 62 IN

## 2019-08-04 LAB
ALBUMIN SERPL BCP-MCNC: 3.3 G/DL (ref 3.5–5.2)
ALP SERPL-CCNC: 86 U/L (ref 55–135)
ALT SERPL W/O P-5'-P-CCNC: 16 U/L (ref 10–44)
ANION GAP SERPL CALC-SCNC: 10 MMOL/L (ref 8–16)
AST SERPL-CCNC: 20 U/L (ref 10–40)
BASOPHILS # BLD AUTO: 0.04 K/UL (ref 0–0.2)
BASOPHILS NFR BLD: 0.4 % (ref 0–1.9)
BILIRUB SERPL-MCNC: 0.2 MG/DL (ref 0.1–1)
BUN SERPL-MCNC: 13 MG/DL (ref 6–20)
CALCIUM SERPL-MCNC: 8.3 MG/DL (ref 8.7–10.5)
CHLORIDE SERPL-SCNC: 103 MMOL/L (ref 95–110)
CO2 SERPL-SCNC: 25 MMOL/L (ref 23–29)
CREAT SERPL-MCNC: 0.8 MG/DL (ref 0.5–1.4)
DIFFERENTIAL METHOD: ABNORMAL
EOSINOPHIL # BLD AUTO: 0.1 K/UL (ref 0–0.5)
EOSINOPHIL NFR BLD: 1 % (ref 0–8)
ERYTHROCYTE [DISTWIDTH] IN BLOOD BY AUTOMATED COUNT: 18.7 % (ref 11.5–14.5)
EST. GFR  (AFRICAN AMERICAN): >60 ML/MIN/1.73 M^2
EST. GFR  (NON AFRICAN AMERICAN): >60 ML/MIN/1.73 M^2
GLUCOSE SERPL-MCNC: 113 MG/DL (ref 70–110)
HCT VFR BLD AUTO: 31.9 % (ref 37–48.5)
HGB BLD-MCNC: 10.2 G/DL (ref 12–16)
IMM GRANULOCYTES # BLD AUTO: 0.07 K/UL (ref 0–0.04)
IMM GRANULOCYTES NFR BLD AUTO: 0.7 % (ref 0–0.5)
LYMPHOCYTES # BLD AUTO: 5.1 K/UL (ref 1–4.8)
LYMPHOCYTES NFR BLD: 52.4 % (ref 18–48)
MAGNESIUM SERPL-MCNC: 2.1 MG/DL (ref 1.6–2.6)
MCH RBC QN AUTO: 21.2 PG (ref 27–31)
MCHC RBC AUTO-ENTMCNC: 32 G/DL (ref 32–36)
MCV RBC AUTO: 66 FL (ref 82–98)
MONOCYTES # BLD AUTO: 0.9 K/UL (ref 0.3–1)
MONOCYTES NFR BLD: 8.7 % (ref 4–15)
NEUTROPHILS # BLD AUTO: 3.6 K/UL (ref 1.8–7.7)
NEUTROPHILS NFR BLD: 36.8 % (ref 38–73)
NRBC BLD-RTO: 1 /100 WBC
PHOSPHATE SERPL-MCNC: 3.1 MG/DL (ref 2.7–4.5)
PLATELET # BLD AUTO: 254 K/UL (ref 150–350)
PMV BLD AUTO: 9.2 FL (ref 9.2–12.9)
POTASSIUM SERPL-SCNC: 3.8 MMOL/L (ref 3.5–5.1)
PROT SERPL-MCNC: 7.3 G/DL (ref 6–8.4)
RBC # BLD AUTO: 4.81 M/UL (ref 4–5.4)
RETICS/RBC NFR AUTO: 2.7 % (ref 0.5–2.5)
SODIUM SERPL-SCNC: 138 MMOL/L (ref 136–145)
WBC # BLD AUTO: 9.8 K/UL (ref 3.9–12.7)

## 2019-08-04 PROCEDURE — 84100 ASSAY OF PHOSPHORUS: CPT

## 2019-08-04 PROCEDURE — 94640 AIRWAY INHALATION TREATMENT: CPT

## 2019-08-04 PROCEDURE — 83735 ASSAY OF MAGNESIUM: CPT

## 2019-08-04 PROCEDURE — 25000003 PHARM REV CODE 250: Performed by: STUDENT IN AN ORGANIZED HEALTH CARE EDUCATION/TRAINING PROGRAM

## 2019-08-04 PROCEDURE — 80053 COMPREHEN METABOLIC PANEL: CPT

## 2019-08-04 PROCEDURE — 36415 COLL VENOUS BLD VENIPUNCTURE: CPT

## 2019-08-04 PROCEDURE — 99238 PR HOSPITAL DISCHARGE DAY,<30 MIN: ICD-10-PCS | Mod: ,,,

## 2019-08-04 PROCEDURE — 63600175 PHARM REV CODE 636 W HCPCS: Performed by: STUDENT IN AN ORGANIZED HEALTH CARE EDUCATION/TRAINING PROGRAM

## 2019-08-04 PROCEDURE — 85045 AUTOMATED RETICULOCYTE COUNT: CPT

## 2019-08-04 PROCEDURE — 25000242 PHARM REV CODE 250 ALT 637 W/ HCPCS: Performed by: STUDENT IN AN ORGANIZED HEALTH CARE EDUCATION/TRAINING PROGRAM

## 2019-08-04 PROCEDURE — 85025 COMPLETE CBC W/AUTO DIFF WBC: CPT

## 2019-08-04 PROCEDURE — 94761 N-INVAS EAR/PLS OXIMETRY MLT: CPT

## 2019-08-04 PROCEDURE — 99238 HOSP IP/OBS DSCHRG MGMT 30/<: CPT | Mod: ,,,

## 2019-08-04 RX ORDER — ADHESIVE BANDAGE
30 BANDAGE TOPICAL DAILY PRN
Status: CANCELLED | OUTPATIENT
Start: 2019-08-04

## 2019-08-04 RX ORDER — ADHESIVE BANDAGE
30 BANDAGE TOPICAL DAILY PRN
Status: DISCONTINUED | OUTPATIENT
Start: 2019-08-04 | End: 2019-08-04 | Stop reason: HOSPADM

## 2019-08-04 RX ORDER — CARVEDILOL 12.5 MG/1
25 TABLET ORAL 2 TIMES DAILY WITH MEALS
Qty: 120 TABLET | Refills: 0 | Status: ON HOLD
Start: 2019-08-04 | End: 2020-01-11 | Stop reason: CLARIF

## 2019-08-04 RX ORDER — SODIUM CHLORIDE, SODIUM LACTATE, POTASSIUM CHLORIDE, CALCIUM CHLORIDE 600; 310; 30; 20 MG/100ML; MG/100ML; MG/100ML; MG/100ML
INJECTION, SOLUTION INTRAVENOUS CONTINUOUS
Status: DISCONTINUED | OUTPATIENT
Start: 2019-08-04 | End: 2019-08-04 | Stop reason: HOSPADM

## 2019-08-04 RX ORDER — POTASSIUM CHLORIDE 20 MEQ/1
20 TABLET, EXTENDED RELEASE ORAL ONCE
Status: COMPLETED | OUTPATIENT
Start: 2019-08-04 | End: 2019-08-04

## 2019-08-04 RX ADMIN — OXYCODONE HYDROCHLORIDE AND ACETAMINOPHEN 1 TABLET: 10; 325 TABLET ORAL at 02:08

## 2019-08-04 RX ADMIN — ENOXAPARIN SODIUM 40 MG: 100 INJECTION SUBCUTANEOUS at 08:08

## 2019-08-04 RX ADMIN — AZITHROMYCIN 500 MG: 250 TABLET, FILM COATED ORAL at 08:08

## 2019-08-04 RX ADMIN — HYDROMORPHONE HYDROCHLORIDE 1 MG: 1 INJECTION, SOLUTION INTRAMUSCULAR; INTRAVENOUS; SUBCUTANEOUS at 02:08

## 2019-08-04 RX ADMIN — CARBAMAZEPINE 800 MG: 200 TABLET ORAL at 11:08

## 2019-08-04 RX ADMIN — SODIUM CHLORIDE: 0.9 INJECTION, SOLUTION INTRAVENOUS at 12:08

## 2019-08-04 RX ADMIN — OXYCODONE HYDROCHLORIDE AND ACETAMINOPHEN 1 TABLET: 10; 325 TABLET ORAL at 05:08

## 2019-08-04 RX ADMIN — MAGNESIUM HYDROXIDE 2400 MG: 400 SUSPENSION ORAL at 02:08

## 2019-08-04 RX ADMIN — FLUOXETINE 40 MG: 20 CAPSULE ORAL at 08:08

## 2019-08-04 RX ADMIN — CARVEDILOL 25 MG: 25 TABLET, FILM COATED ORAL at 08:08

## 2019-08-04 RX ADMIN — SENNOSIDES,DOCUSATE SODIUM 1 TABLET: 8.6; 5 TABLET, FILM COATED ORAL at 08:08

## 2019-08-04 RX ADMIN — POTASSIUM CHLORIDE 20 MEQ: 20 TABLET, EXTENDED RELEASE ORAL at 11:08

## 2019-08-04 RX ADMIN — Medication 500 MG: at 08:08

## 2019-08-04 RX ADMIN — GUAIFENESIN 600 MG: 600 TABLET, EXTENDED RELEASE ORAL at 08:08

## 2019-08-04 RX ADMIN — HYDROMORPHONE HYDROCHLORIDE 1 MG: 1 INJECTION, SOLUTION INTRAMUSCULAR; INTRAVENOUS; SUBCUTANEOUS at 07:08

## 2019-08-04 RX ADMIN — FLUTICASONE PROPIONATE 100 MCG: 50 SPRAY, METERED NASAL at 08:08

## 2019-08-04 RX ADMIN — OXYCODONE HYDROCHLORIDE AND ACETAMINOPHEN 1 TABLET: 10; 325 TABLET ORAL at 01:08

## 2019-08-04 RX ADMIN — HYDROMORPHONE HYDROCHLORIDE 1 MG: 1 INJECTION, SOLUTION INTRAMUSCULAR; INTRAVENOUS; SUBCUTANEOUS at 11:08

## 2019-08-04 RX ADMIN — SODIUM CHLORIDE, SODIUM LACTATE, POTASSIUM CHLORIDE, AND CALCIUM CHLORIDE: .6; .31; .03; .02 INJECTION, SOLUTION INTRAVENOUS at 08:08

## 2019-08-04 RX ADMIN — IPRATROPIUM BROMIDE AND ALBUTEROL SULFATE 3 ML: .5; 3 SOLUTION RESPIRATORY (INHALATION) at 12:08

## 2019-08-04 RX ADMIN — PREDNISONE 40 MG: 20 TABLET ORAL at 08:08

## 2019-08-04 RX ADMIN — FLUTICASONE FUROATE AND VILANTEROL TRIFENATATE 1 PUFF: 100; 25 POWDER RESPIRATORY (INHALATION) at 08:08

## 2019-08-04 RX ADMIN — IPRATROPIUM BROMIDE AND ALBUTEROL SULFATE 3 ML: .5; 3 SOLUTION RESPIRATORY (INHALATION) at 01:08

## 2019-08-04 RX ADMIN — AMLODIPINE BESYLATE 10 MG: 10 TABLET ORAL at 08:08

## 2019-08-04 RX ADMIN — GABAPENTIN 300 MG: 100 CAPSULE ORAL at 08:08

## 2019-08-04 NOTE — SUBJECTIVE & OBJECTIVE
Interval History: Ms. Escalante denies pain today and feels ready to go home. She denies CP or SOB.     Review of Systems   Constitutional: Negative for fever and unexpected weight change.   HENT: Negative for hearing loss, sore throat, tinnitus and trouble swallowing.    Eyes: Negative for pain and visual disturbance.   Respiratory: Positive for cough.    Cardiovascular: Negative for chest pain and leg swelling.   Gastrointestinal: Negative for abdominal pain, blood in stool, nausea and vomiting.   Endocrine: Negative for cold intolerance and heat intolerance.   Genitourinary: Negative for decreased urine volume and difficulty urinating.   Musculoskeletal:        Right leg pain   Skin: Negative for color change and rash.   Neurological: Negative for headaches.     Objective:     Vital Signs (Most Recent):  Temp: 97.5 °F (36.4 °C) (08/04/19 0310)  Pulse: 80 (08/04/19 0310)  Resp: 15 (08/04/19 0037)  BP: (!) 156/76 (08/04/19 0310)  SpO2: 95 % (08/04/19 0310) Vital Signs (24h Range):  Temp:  [97.5 °F (36.4 °C)-98.7 °F (37.1 °C)] 97.5 °F (36.4 °C)  Pulse:  [78-86] 80  Resp:  [15-18] 15  SpO2:  [95 %-99 %] 95 %  BP: (135-183)/() 156/76     Weight: (!) 137.1 kg (302 lb 4 oz)  Body mass index is 55.28 kg/m².  No intake or output data in the 24 hours ending 08/04/19 0736   Physical Exam   Constitutional: She appears well-developed and well-nourished. No distress.   HENT:   Head: Normocephalic.   Eyes: Pupils are equal, round, and reactive to light. Conjunctivae and EOM are normal. No scleral icterus.   Neck: Normal range of motion. Neck supple.   Cardiovascular: Normal rate, regular rhythm and normal heart sounds.   Pulmonary/Chest: Effort normal and breath sounds normal.   Abdominal: Soft. Bowel sounds are normal. She exhibits no distension. There is no tenderness.   Musculoskeletal: She exhibits no edema or tenderness.   Skin: Skin is warm and dry. No rash noted. She is not diaphoretic. No erythema.        Significant Labs: All pertinent labs within the past 24 hours have been reviewed.    Significant Imaging: I have reviewed and interpreted all pertinent imaging results/findings within the past 24 hours.

## 2019-08-04 NOTE — ASSESSMENT & PLAN NOTE
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 Reticulocyte count 2.2 followed by 2.7.    Plan:  - pain control with home percocet  q4 PRN for moderate pain with hydromorphone 1 mg IV q4 PRN for severe pain  - IVF 75 mL/hr for one day (8/3 - 8/4)  - supplemental oxygen 2 L NC

## 2019-08-04 NOTE — ASSESSMENT & PLAN NOTE
- continue home amlodipine 10, increase home carvedilol 12.5 BID to 25 BID, and stop HCTZ for now due to sickle pain (will be discharged with HCTZ)

## 2019-08-04 NOTE — PROGRESS NOTES
AAOx4. Pt discharged home by personal transportation. Assisted to lobby via w/c with all belongings. No concerns voiced.Educated on  discharge summary and documentation provided.

## 2019-08-05 NOTE — PLAN OF CARE
Patient discharged home with family.    Future Appointments   Date Time Provider Department Center   8/6/2019  2:45 PM Duglas Crespo MD [facility] [address] [facility]   8/8/2019  9:30 AM Julie R. Jeansonne, MD [id number]        08/05/19 0753   Final Note   Assessment Type Final Discharge Note   Anticipated Discharge Disposition Home   Right Care Referral Info   Post Acute Recommendation No Care

## 2019-08-05 NOTE — DISCHARGE SUMMARY
[facility] Hospital Medicine  Discharge Summary      Patient Name: Nazanin Malone  MRN: [id number]  Admission Date: 7/30/2019  Hospital Length of Stay: 5 days  Discharge Date and Time:  08/04/2019 8:43 PM  Attending Physician: No att. providers found   Discharging Provider: Bridget Mott MD  Primary Care Provider: Balta Forbes MD  [facility] Medicine Team: [facility] HOSP MED 1 Bridget Mott MD    HPI:   Ms. Malone is a 39-year-old lady with PMH sickle cell, beta thalassemia, HTN, and asthma who presents with worsening SOB x 2 days after seeing her niece over the weekend who had URI symptoms. The dyspnea is worse when lying down and with exertion. She can only walk about 20 feet without becoming short of breath. She has an associated cough productive of greenish sputum on the morning of her admission but has since become clear. She also reports wheezing. Her home nebulizer has not relieved her symptoms. She reports diffuse chest heaviness since the night prior to admission and pain in left arm and her lower legs unrelieved by percocet at home. She describes a crackling she hears in her throat when she inspires that she has never heard before. ROS positive for headache, nasal congestion, numbness and tingling in her left leg for 2 weeks, dry eyes for 2 weeks, and chills.     She reports experiencing 1-3 vasooclusive crises per year that have required ED presentation and admission. She has had acute chest syndrome 3 times (1 year ago, 15 years ago, and once in between). She has also been admitted for pneumonia multiple times, reporting that whenever she is around sick contacts, she tends to develop pneumonia. She reports that her symptoms currently feel most similar to previous episodes of pneumonia and not acute chest syndrome. Her most recent admission for pneumonia was on 4/25/19, which resolved with 10 day course of Levaquin. She has a remote smoking history (stopped 20 years ago), and denies  "illicit drug use.    * No surgery found *      Hospital Course:   Ms. Malone was admitted to hospital medicine for SOB associated with left arm and bilateral leg pain on 7/30. On 7/31, she received her first dose of 5-day course of azithromycin and prednisone for an asthma exacerbation possibly associated with pneumonia. On 8/1, patient's echo was unremarkable for heart failure or pulmonary hypertension. She was still complaining of SOB, but her pain was improving. On 8/2, patient was satting in the 90s on room air, but continued to endorse pain. IV dilaudid PRN was ordered. On 8/3, patient denied SOB but complaining of 10/10 diffuse left leg pain that feels "like a sickle crisis." Oxygen and duonebs PRN were given. Oxycodone was decreased to home percocet  q4 PRN for moderate pain with IV dilaudid 1 mg q6 PRN for severe pain. IVF 75 mL/hr for 1 day. On 8/4, patient reported significant improvement in symptoms and felt ready to be discharged.      Interval History: Ms. Escalante denies pain today and feels ready to go home. She denies CP or SOB.      Review of Systems   Constitutional: Negative for fever and unexpected weight change.   HENT: Negative for hearing loss, sore throat, tinnitus and trouble swallowing.    Eyes: Negative for pain and visual disturbance.   Respiratory: Positive for cough.    Cardiovascular: Negative for chest pain and leg swelling.   Gastrointestinal: Negative for abdominal pain, blood in stool, nausea and vomiting.   Endocrine: Negative for cold intolerance and heat intolerance.   Genitourinary: Negative for decreased urine volume and difficulty urinating.   Musculoskeletal:        Right leg pain   Skin: Negative for color change and rash.   Neurological: Negative for headaches.      Objective:      Vital Signs (Most Recent):  Temp: 97.5 °F (36.4 °C) (08/04/19 0310)  Pulse: 80 (08/04/19 0310)  Resp: 15 (08/04/19 0037)  BP: (!) 156/76 (08/04/19 0310)  SpO2: 95 % (08/04/19 0310) Vital " Signs (24h Range):  Temp:  [97.5 °F (36.4 °C)-98.7 °F (37.1 °C)] 97.5 °F (36.4 °C)  Pulse:  [78-86] 80  Resp:  [15-18] 15  SpO2:  [95 %-99 %] 95 %  BP: (135-183)/() 156/76      Weight: (!) 137.1 kg (302 lb 4 oz)  Body mass index is 55.28 kg/m².  No intake or output data in the 24 hours ending 08/04/19 0736   Physical Exam   Constitutional: She appears well-developed and well-nourished. No distress.   HENT:   Head: Normocephalic.   Eyes: Pupils are equal, round, and reactive to light. Conjunctivae and EOM are normal. No scleral icterus.   Neck: Normal range of motion. Neck supple.   Cardiovascular: Normal rate, regular rhythm and normal heart sounds.   Pulmonary/Chest: Effort normal and breath sounds normal.   Abdominal: Soft. Bowel sounds are normal. She exhibits no distension. There is no tenderness.   Musculoskeletal: She exhibits no edema or tenderness.   Skin: Skin is warm and dry. No rash noted. She is not diaphoretic. No erythema.         Significant Labs: All pertinent labs within the past 24 hours have been reviewed.     Significant Imaging: I have reviewed and interpreted all pertinent imaging results/findings within the past 24 hours.      Consults:   Consults (From admission, onward)        Status Ordering Provider     Inpatient consult to Midline team  Once     Provider:  (Not yet assigned)    Completed IVY VASQUEZ     [facility]  Once     Provider:  (Not yet assigned)    Completed SHWETHA CALDWELL          Hb-SS disease with vaso-occlusive pain  History of sickle cell and beta thalassemia with previous vasooclusive crises. Reticulocyte count 2.2 followed by 2.7.    Plan:  - pain control with home percocet  q4 PRN for moderate pain with hydromorphone 1 mg IV q4 PRN for severe pain  - IVF 75 mL/hr for one day (8/3 - 8/4)  - supplemental oxygen 2 L NC      Intermittent asthma with acute exacerbation  - continue home albuterol  - duonebs PRN q4      Benign essential  "HTN  - continue home amlodipine 10, increase home carvedilol 12.5 BID to 25 BID, and stop HCTZ for now due to sickle pain (will be discharged with HCTZ)    Trigeminal neuralgia  - continue home carbamazepine 12.5 BID PO        Final Active Diagnoses:    Diagnosis Date Noted POA    Hb-SS disease with vaso-occlusive pain [D57.00] 07/30/2019 Yes    Intermittent asthma with acute exacerbation [J45.21] 04/26/2019 Yes    Benign essential HTN [I10] 04/16/2017 Yes    Trigeminal neuralgia [G50.0] 03/20/2018 Yes      Problems Resolved During this Admission:    Diagnosis Date Noted Date Resolved POA    PRINCIPAL PROBLEM:  Moderate asthma with exacerbation [J45.901] 04/25/2017 08/02/2019 Yes       Discharged Condition: good    Disposition: Home or Self Care    Follow Up:  Follow-up Information     Balta Forbes MD In 1 week.    Specialty:  Internal Medicine  Contact information:  [address]  [place] [address]  [phone]                 Patient Instructions:      NEBULIZER FOR HOME USE     Order Specific Question Answer Comments   Height: 5' 2" (1.575 m)    Weight: 136.1 kg (300 lb)    Does patient have medical equipment at home? none    Length of need (1-99 months): 99      Ambulatory referral to Sleep Disorders   Referral Priority: Routine Referral Type: Consultation   Requested Specialty: Sleep Medicine   Number of Visits Requested: 1     Diet Adult Regular     Diet Cardiac     Notify your health care provider if you experience any of the following:  temperature >100.4     Notify your health care provider if you experience any of the following:  difficulty breathing or increased cough     Notify your health care provider if you experience any of the following:  persistent dizziness, light-headedness, or visual disturbances     Notify your health care provider if you experience any of the following:  severe uncontrolled pain     Notify your health care provider if you experience any of the following:  " difficulty breathing or increased cough     Notify your health care provider if you experience any of the following:  increased confusion or weakness     Activity as tolerated     Activity as tolerated       Significant Diagnostic Studies: Labs:   CMP   Recent Labs   Lab 08/03/19  0536 08/04/19  0615    138   K 3.7 3.8    103   CO2 28 25   GLU 95 113*   BUN 11 13   CREATININE 0.8 0.8   CALCIUM 8.7 8.3*   PROT 7.6 7.3   ALBUMIN 3.5 3.3*   BILITOT 0.3 0.2   ALKPHOS 90 86   AST 19 20   ALT 15 16   ANIONGAP 8 10   ESTGFRAFRICA >60.0 >60.0   EGFRNONAA >60.0 >60.0   , CBC   Recent Labs   Lab 08/03/19  0536 08/04/19  0615   WBC 8.81 9.80   HGB 9.9* 10.2*   HCT 32.1* 31.9*    254    and All labs within the past 24 hours have been reviewed  Radiology: X-Ray: CXR: reviewed.    Pending Diagnostic Studies:     None         Medications:  Reconciled Home Medications:      Medication List      START taking these medications    albuterol-ipratropium 2.5 mg-0.5 mg/3 mL nebulizer solution  Commonly known as:  DUO-NEB  Take 3 mLs by nebulization every 6 (six) hours as needed for Wheezing or Shortness of Breath. Rescue     carvedilol 12.5 MG tablet  Commonly known as:  COREG  Take 2 tablets (25 mg total) by mouth 2 (two) times daily with meals.     guaiFENesin 600 mg 12 hr tablet  Commonly known as:  MUCINEX  Take 1 tablet (600 mg total) by mouth 2 (two) times daily. for 10 days        CHANGE how you take these medications    albuterol 90 mcg/actuation inhaler  Commonly known as:  PROVENTIL/VENTOLIN HFA  Inhale 2 puffs into the lungs every 6 (six) hours as needed for Wheezing or Shortness of Breath. Rescue  What changed:  reasons to take this     ergocalciferol 50,000 unit Cap  Commonly known as:  VITAMIN D2  Take 1 capsule (50,000 Units total) by mouth every 7 days. (Wednesdays)  What changed:  additional instructions     SYMBICORT 80-4.5 mcg/actuation Hfaa  Generic drug:  budesonide-formoterol 80-4.5 mcg  Inhale 2  puffs into the lungs 2 (two) times daily. Controller  What changed:    · when to take this  · reasons to take this        CONTINUE taking these medications    amLODIPine 10 MG tablet  Commonly known as:  NORVASC  Take 1 tablet (10 mg total) by mouth once daily.     calcium carbonate 200 mg calcium (500 mg) chewable tablet  Commonly known as:  TUMS  Take 1 tablet by mouth daily as needed for Heartburn (stomach).     carBAMazepine 200 mg tablet  Commonly known as:  TEGRETOL  Take 4 tablets (800 mg total) by mouth 2 (two) times daily.     cyclobenzaprine 10 MG tablet  Commonly known as:  FLEXERIL  Take 1 tablet (10 mg total) by mouth 3 (three) times daily as needed for Muscle spasms.     FLUoxetine 40 MG capsule  TAKE 1 CAPSULE BY MOUTH EVERY DAY     fluticasone propionate 50 mcg/actuation nasal spray  Commonly known as:  FLONASE  2 sprays (100 mcg total) by Each Nare route once daily.     gabapentin 800 MG tablet  Commonly known as:  NEURONTIN  Take 1 tablet (800 mg total) by mouth 2 (two) times daily.     hydroCHLOROthiazide 25 MG tablet  Commonly known as:  HYDRODIURIL  Take 1 tablet (25 mg total) by mouth once daily.     ondansetron 8 MG Tbdl  Commonly known as:  ZOFRAN-ODT  Dissolve 1 tablet (8 mg total) by mouth every 6 (six) hours as needed.     oxyCODONE-acetaminophen  mg per tablet  Commonly known as:  PERCOCET  Take 1 tablet by mouth every 4 (four) hours as needed for Pain.     prochlorperazine 10 MG tablet  Commonly known as:  COMPAZINE  Take 1 tablet (10 mg total) by mouth every 6 (six) hours as needed.     senna-docusate 8.6-50 mg 8.6-50 mg per tablet  Commonly known as:  PERICOLACE  Take 1 tablet by mouth 2 (two) times daily as needed for Constipation.     TYLENOL EXTRA STRENGTH 500 MG tablet  Generic drug:  acetaminophen  Take 1,000 mg by mouth daily as needed for Pain.     VITAMIN C 500 MG tablet  Generic drug:  ascorbic acid (vitamin C)  Take 500 mg by mouth once daily.            Indwelling  Lines/Drains at time of discharge:   Lines/Drains/Airways          None          Time spent on the discharge of patient: 60 minutes  Patient was seen and examined on the date of discharge and determined to be suitable for discharge.         Bridget Mott MD  Department of Hospital Medicine  [facility]

## 2019-08-09 DIAGNOSIS — D57.40 SICKLE CELL BETA THALASSEMIA: ICD-10-CM

## 2019-08-09 RX ORDER — OXYCODONE AND ACETAMINOPHEN 10; 325 MG/1; MG/1
1 TABLET ORAL EVERY 4 HOURS PRN
Qty: 120 TABLET | Refills: 0 | Status: SHIPPED | OUTPATIENT
Start: 2019-08-09 | End: 2019-09-06 | Stop reason: SDUPTHER

## 2019-08-09 NOTE — TELEPHONE ENCOUNTER
----- Message from Jenna Briggs sent at 8/9/2019  2:57 PM CDT -----  Refill or New Rx: Refill   RX Name and Strength:oxyCODONE-acetaminophen (PERCOCET)  mg per tablet  How is the patient currently taking it? (ex. 1XDay):  Is this a 30 day or 90 day RX:  Preferred Pharmacy with phone number:[facility]  [phone]-Fax [phone]  Local or Mail Order:Local   Ordering Provider:  Best Call Back Number:[phone]  Additional Information:

## 2019-08-30 DIAGNOSIS — F43.20 ADJUSTMENT DISORDER, UNSPECIFIED TYPE: ICD-10-CM

## 2019-08-30 DIAGNOSIS — I10 ESSENTIAL HYPERTENSION: ICD-10-CM

## 2019-08-30 RX ORDER — FLUOXETINE HYDROCHLORIDE 40 MG/1
CAPSULE ORAL
Qty: 30 CAPSULE | Refills: 2 | Status: SHIPPED | OUTPATIENT
Start: 2019-08-30 | End: 2020-04-10 | Stop reason: SDUPTHER

## 2019-08-30 RX ORDER — AMLODIPINE BESYLATE 10 MG/1
TABLET ORAL
Qty: 30 TABLET | Refills: 2 | Status: SHIPPED | OUTPATIENT
Start: 2019-08-30 | End: 2019-11-06 | Stop reason: SDUPTHER

## 2019-09-06 ENCOUNTER — PATIENT MESSAGE (OUTPATIENT)
Dept: HEMATOLOGY/ONCOLOGY | Facility: CLINIC | Age: 39
End: 2019-09-06

## 2019-09-06 DIAGNOSIS — D57.40 SICKLE CELL BETA THALASSEMIA: ICD-10-CM

## 2019-09-06 RX ORDER — OXYCODONE AND ACETAMINOPHEN 10; 325 MG/1; MG/1
1 TABLET ORAL EVERY 4 HOURS PRN
Qty: 120 TABLET | Refills: 0 | Status: SHIPPED | OUTPATIENT
Start: 2019-09-06 | End: 2019-10-04 | Stop reason: SDUPTHER

## 2019-09-30 ENCOUNTER — TELEPHONE (OUTPATIENT)
Dept: HEMATOLOGY/ONCOLOGY | Facility: CLINIC | Age: 39
End: 2019-09-30

## 2019-09-30 ENCOUNTER — INFUSION (OUTPATIENT)
Dept: INFUSION THERAPY | Facility: HOSPITAL | Age: 39
End: 2019-09-30
Attending: NURSE PRACTITIONER
Payer: MEDICAID

## 2019-09-30 VITALS
DIASTOLIC BLOOD PRESSURE: 106 MMHG | HEART RATE: 95 BPM | TEMPERATURE: 100 F | RESPIRATION RATE: 17 BRPM | SYSTOLIC BLOOD PRESSURE: 170 MMHG

## 2019-09-30 DIAGNOSIS — D50.9 IRON DEFICIENCY ANEMIA, UNSPECIFIED IRON DEFICIENCY ANEMIA TYPE: ICD-10-CM

## 2019-09-30 DIAGNOSIS — D57.439 SICKLE CELL DISEASE, TYPE S BETA-ZERO THALASSEMIA WITH CRISIS: Primary | ICD-10-CM

## 2019-09-30 DIAGNOSIS — D57.00 SICKLE-CELL DISEASE WITH PAIN: ICD-10-CM

## 2019-09-30 PROCEDURE — 63600175 PHARM REV CODE 636 W HCPCS: Performed by: INTERNAL MEDICINE

## 2019-09-30 PROCEDURE — 96374 THER/PROPH/DIAG INJ IV PUSH: CPT

## 2019-09-30 PROCEDURE — 63600175 PHARM REV CODE 636 W HCPCS: Performed by: NURSE PRACTITIONER

## 2019-09-30 PROCEDURE — 96361 HYDRATE IV INFUSION ADD-ON: CPT

## 2019-09-30 PROCEDURE — 96375 TX/PRO/DX INJ NEW DRUG ADDON: CPT

## 2019-09-30 RX ORDER — SODIUM CHLORIDE 0.9 % (FLUSH) 0.9 %
10 SYRINGE (ML) INJECTION
Status: CANCELLED | OUTPATIENT
Start: 2019-09-30

## 2019-09-30 RX ORDER — HYDROMORPHONE HYDROCHLORIDE 2 MG/ML
0.5 INJECTION, SOLUTION INTRAMUSCULAR; INTRAVENOUS; SUBCUTANEOUS ONCE
Status: COMPLETED | OUTPATIENT
Start: 2019-09-30 | End: 2019-09-30

## 2019-09-30 RX ORDER — HYDROMORPHONE HYDROCHLORIDE 2 MG/ML
1 INJECTION, SOLUTION INTRAMUSCULAR; INTRAVENOUS; SUBCUTANEOUS ONCE
Status: COMPLETED | OUTPATIENT
Start: 2019-09-30 | End: 2019-09-30

## 2019-09-30 RX ORDER — HYDROMORPHONE HYDROCHLORIDE 2 MG/ML
1 INJECTION, SOLUTION INTRAMUSCULAR; INTRAVENOUS; SUBCUTANEOUS ONCE
Status: CANCELLED
Start: 2019-09-30

## 2019-09-30 RX ORDER — HYDROMORPHONE HYDROCHLORIDE 2 MG/ML
0.5 INJECTION, SOLUTION INTRAMUSCULAR; INTRAVENOUS; SUBCUTANEOUS ONCE
Status: CANCELLED
Start: 2019-09-30

## 2019-09-30 RX ORDER — HEPARIN 100 UNIT/ML
500 SYRINGE INTRAVENOUS
Status: CANCELLED | OUTPATIENT
Start: 2019-09-30

## 2019-09-30 RX ADMIN — SODIUM CHLORIDE 1000 ML: 0.9 INJECTION, SOLUTION INTRAVENOUS at 01:09

## 2019-09-30 RX ADMIN — HYDROMORPHONE HYDROCHLORIDE 1 MG: 2 INJECTION, SOLUTION INTRAMUSCULAR; INTRAVENOUS; SUBCUTANEOUS at 01:09

## 2019-09-30 RX ADMIN — HYDROMORPHONE HYDROCHLORIDE 0.5 MG: 2 INJECTION INTRAMUSCULAR; INTRAVENOUS; SUBCUTANEOUS at 03:09

## 2019-09-30 NOTE — TELEPHONE ENCOUNTER
Called to discuss pain further, set up follow up anuj, and discuss referral.   Left message with return number.

## 2019-09-30 NOTE — PLAN OF CARE
Patient here for IVF/pain meds.  Assessment completed. VSS.  No needs at this time.  Chair reclined and blanket offered.  Will continue to monitor.

## 2019-09-30 NOTE — TELEPHONE ENCOUNTER
----- Message from Joelle Nevarez sent at 9/30/2019  8:56 AM CDT -----  Contact: self  Type:  Patient need to speak with nurse.   Patient states experiencing severe pain in right leg   Patient states requesting referral for pain medicine clinic   Name of Caller:Patient   When is the first available appointment?  Symptoms:  Best Call Back Number:[phone]  Additional Information:

## 2019-10-04 ENCOUNTER — PATIENT MESSAGE (OUTPATIENT)
Dept: HEMATOLOGY/ONCOLOGY | Facility: CLINIC | Age: 39
End: 2019-10-04

## 2019-10-04 DIAGNOSIS — D57.40 SICKLE CELL BETA THALASSEMIA: ICD-10-CM

## 2019-10-04 RX ORDER — OXYCODONE AND ACETAMINOPHEN 10; 325 MG/1; MG/1
1 TABLET ORAL EVERY 4 HOURS PRN
Qty: 120 TABLET | Refills: 0 | Status: SHIPPED | OUTPATIENT
Start: 2019-10-04 | End: 2019-11-04 | Stop reason: SDUPTHER

## 2019-11-04 DIAGNOSIS — D57.40 SICKLE CELL BETA THALASSEMIA: ICD-10-CM

## 2019-11-04 RX ORDER — OXYCODONE AND ACETAMINOPHEN 10; 325 MG/1; MG/1
1 TABLET ORAL EVERY 4 HOURS PRN
Qty: 120 TABLET | Refills: 0 | Status: CANCELLED | OUTPATIENT
Start: 2019-11-04

## 2019-11-04 NOTE — TELEPHONE ENCOUNTER
----- Message from Jarett Euceda sent at 11/4/2019  1:37 PM CST -----  Contact: pt  Refill or New Rx: Refill  RX Name and Strength: oxyCODONE-acetaminophen (PERCOCET)  mg per tablet  Preferred Pharmacy with phone number: [facility]  Ordering Provider: Ulises Brady Call Back Number:[phone]  Additional Information:

## 2019-11-05 ENCOUNTER — PATIENT MESSAGE (OUTPATIENT)
Dept: HEMATOLOGY/ONCOLOGY | Facility: CLINIC | Age: 39
End: 2019-11-05

## 2019-11-05 RX ORDER — OXYCODONE AND ACETAMINOPHEN 10; 325 MG/1; MG/1
1 TABLET ORAL EVERY 4 HOURS PRN
Qty: 120 TABLET | Refills: 0 | Status: SHIPPED | OUTPATIENT
Start: 2019-11-05 | End: 2019-11-22 | Stop reason: SDUPTHER

## 2019-11-06 DIAGNOSIS — I10 ESSENTIAL HYPERTENSION: ICD-10-CM

## 2019-11-07 RX ORDER — AMLODIPINE BESYLATE 10 MG/1
TABLET ORAL
Qty: 30 TABLET | Refills: 2 | Status: SHIPPED | OUTPATIENT
Start: 2019-11-07 | End: 2020-03-30

## 2019-11-18 ENCOUNTER — TELEPHONE (OUTPATIENT)
Dept: HEMATOLOGY/ONCOLOGY | Facility: CLINIC | Age: 39
End: 2019-11-18

## 2019-11-18 NOTE — TELEPHONE ENCOUNTER
"Returned call scheduled a follow up for 12/24. Mailed appt slip    ----- Message from Xi Aparicio sent at 11/18/2019  4:03 PM CST -----  Contact: Nazanin   Scheduling Request    Patient Status: established   Scheduling Appt : f/u and lab   Time/Date Preference:  Open   MyChart Active User?: yes   Relationship to Patient?: self   Contact Preference?:[phone]  Treating Provider: Pee Montgomery MD  Do you feel you need to be seen today? No     Additional Notes:  "Thank you for all that you do for our patients'"        "

## 2019-11-22 ENCOUNTER — PATIENT MESSAGE (OUTPATIENT)
Dept: HEMATOLOGY/ONCOLOGY | Facility: CLINIC | Age: 39
End: 2019-11-22

## 2019-11-22 DIAGNOSIS — D57.40 SICKLE CELL BETA THALASSEMIA: ICD-10-CM

## 2019-11-22 RX ORDER — OXYCODONE AND ACETAMINOPHEN 10; 325 MG/1; MG/1
1 TABLET ORAL EVERY 4 HOURS PRN
Qty: 120 TABLET | Refills: 0 | Status: CANCELLED | OUTPATIENT
Start: 2019-11-22

## 2019-11-22 NOTE — TELEPHONE ENCOUNTER
----- Message from Leticia Chowdary sent at 11/22/2019 11:57 AM CST -----  Pt is calling requesting a rx refill       oxyCODONE-acetaminophen (PERCOCET)  mg per tablet    Refilled at [facility]     Pt contact [phone]

## 2019-11-24 RX ORDER — OXYCODONE AND ACETAMINOPHEN 10; 325 MG/1; MG/1
1 TABLET ORAL EVERY 4 HOURS PRN
Qty: 120 TABLET | Refills: 0 | Status: SHIPPED | OUTPATIENT
Start: 2019-11-24 | End: 2019-12-24 | Stop reason: SDUPTHER

## 2019-12-23 DIAGNOSIS — D57.1 HB-SS DISEASE WITHOUT CRISIS: Primary | ICD-10-CM

## 2019-12-24 ENCOUNTER — OFFICE VISIT (OUTPATIENT)
Dept: HEMATOLOGY/ONCOLOGY | Facility: CLINIC | Age: 39
End: 2019-12-24
Payer: COMMERCIAL

## 2019-12-24 ENCOUNTER — LAB VISIT (OUTPATIENT)
Dept: LAB | Facility: HOSPITAL | Age: 39
End: 2019-12-24
Payer: COMMERCIAL

## 2019-12-24 VITALS
TEMPERATURE: 98 F | HEART RATE: 81 BPM | DIASTOLIC BLOOD PRESSURE: 81 MMHG | RESPIRATION RATE: 16 BRPM | HEIGHT: 62 IN | WEIGHT: 293 LBS | SYSTOLIC BLOOD PRESSURE: 141 MMHG | OXYGEN SATURATION: 97 % | BODY MASS INDEX: 53.92 KG/M2

## 2019-12-24 DIAGNOSIS — D50.0 IRON DEFICIENCY ANEMIA DUE TO CHRONIC BLOOD LOSS: Primary | ICD-10-CM

## 2019-12-24 DIAGNOSIS — D57.40 SICKLE CELL BETA THALASSEMIA: ICD-10-CM

## 2019-12-24 DIAGNOSIS — I10 ESSENTIAL HYPERTENSION: ICD-10-CM

## 2019-12-24 DIAGNOSIS — D57.1 HB-SS DISEASE WITHOUT CRISIS: ICD-10-CM

## 2019-12-24 LAB
ABO + RH BLD: NORMAL
ALBUMIN SERPL BCP-MCNC: 3.3 G/DL (ref 3.5–5.2)
ALP SERPL-CCNC: 93 U/L (ref 55–135)
ALT SERPL W/O P-5'-P-CCNC: 10 U/L (ref 10–44)
ANION GAP SERPL CALC-SCNC: 7 MMOL/L (ref 8–16)
ANISOCYTOSIS BLD QL SMEAR: SLIGHT
AST SERPL-CCNC: 14 U/L (ref 10–40)
BASOPHILS # BLD AUTO: 0.02 K/UL (ref 0–0.2)
BASOPHILS NFR BLD: 0.4 % (ref 0–1.9)
BILIRUB SERPL-MCNC: 0.3 MG/DL (ref 0.1–1)
BLD GP AB SCN CELLS X3 SERPL QL: NORMAL
BUN SERPL-MCNC: 8 MG/DL (ref 6–20)
CALCIUM SERPL-MCNC: 8.4 MG/DL (ref 8.7–10.5)
CHLORIDE SERPL-SCNC: 105 MMOL/L (ref 95–110)
CO2 SERPL-SCNC: 28 MMOL/L (ref 23–29)
CREAT SERPL-MCNC: 0.7 MG/DL (ref 0.5–1.4)
DIFFERENTIAL METHOD: ABNORMAL
EOSINOPHIL # BLD AUTO: 0 K/UL (ref 0–0.5)
EOSINOPHIL NFR BLD: 0 % (ref 0–8)
ERYTHROCYTE [DISTWIDTH] IN BLOOD BY AUTOMATED COUNT: 20.1 % (ref 11.5–14.5)
EST. GFR  (AFRICAN AMERICAN): >60 ML/MIN/1.73 M^2
EST. GFR  (NON AFRICAN AMERICAN): >60 ML/MIN/1.73 M^2
FERRITIN SERPL-MCNC: 2 NG/ML (ref 20–300)
FOLATE SERPL-MCNC: 2.9 NG/ML (ref 4–24)
GLUCOSE SERPL-MCNC: 105 MG/DL (ref 70–110)
HCT VFR BLD AUTO: 31.8 % (ref 37–48.5)
HGB BLD-MCNC: 9.5 G/DL (ref 12–16)
HYPOCHROMIA BLD QL SMEAR: ABNORMAL
IMM GRANULOCYTES # BLD AUTO: 0.01 K/UL (ref 0–0.04)
IMM GRANULOCYTES NFR BLD AUTO: 0.2 % (ref 0–0.5)
LYMPHOCYTES # BLD AUTO: 2.1 K/UL (ref 1–4.8)
LYMPHOCYTES NFR BLD: 37.2 % (ref 18–48)
MCH RBC QN AUTO: 18.6 PG (ref 27–31)
MCHC RBC AUTO-ENTMCNC: 29.9 G/DL (ref 32–36)
MCV RBC AUTO: 62 FL (ref 82–98)
MONOCYTES # BLD AUTO: 0.7 K/UL (ref 0.3–1)
MONOCYTES NFR BLD: 12.2 % (ref 4–15)
NEUTROPHILS # BLD AUTO: 2.8 K/UL (ref 1.8–7.7)
NEUTROPHILS NFR BLD: 50 % (ref 38–73)
NRBC BLD-RTO: 0 /100 WBC
OVALOCYTES BLD QL SMEAR: ABNORMAL
PLATELET # BLD AUTO: 297 K/UL (ref 150–350)
PLATELET BLD QL SMEAR: ABNORMAL
PMV BLD AUTO: 9.4 FL (ref 9.2–12.9)
POIKILOCYTOSIS BLD QL SMEAR: SLIGHT
POLYCHROMASIA BLD QL SMEAR: ABNORMAL
POTASSIUM SERPL-SCNC: 3.7 MMOL/L (ref 3.5–5.1)
PROT SERPL-MCNC: 7.3 G/DL (ref 6–8.4)
RBC # BLD AUTO: 5.1 M/UL (ref 4–5.4)
RETICS/RBC NFR AUTO: 1.4 % (ref 0.5–2.5)
SODIUM SERPL-SCNC: 140 MMOL/L (ref 136–145)
TARGETS BLD QL SMEAR: ABNORMAL
WBC # BLD AUTO: 5.56 K/UL (ref 3.9–12.7)

## 2019-12-24 PROCEDURE — 82728 ASSAY OF FERRITIN: CPT

## 2019-12-24 PROCEDURE — 80053 COMPREHEN METABOLIC PANEL: CPT

## 2019-12-24 PROCEDURE — 82746 ASSAY OF FOLIC ACID SERUM: CPT

## 2019-12-24 PROCEDURE — 85045 AUTOMATED RETICULOCYTE COUNT: CPT

## 2019-12-24 PROCEDURE — 99214 OFFICE O/P EST MOD 30 MIN: CPT | Mod: S$GLB,,, | Performed by: INTERNAL MEDICINE

## 2019-12-24 PROCEDURE — 36415 COLL VENOUS BLD VENIPUNCTURE: CPT

## 2019-12-24 PROCEDURE — 99214 PR OFFICE/OUTPT VISIT, EST, LEVL IV, 30-39 MIN: ICD-10-PCS | Mod: S$GLB,,, | Performed by: INTERNAL MEDICINE

## 2019-12-24 PROCEDURE — 99999 PR PBB SHADOW E&M-EST. PATIENT-LVL III: CPT | Mod: PBBFAC,,, | Performed by: INTERNAL MEDICINE

## 2019-12-24 PROCEDURE — 85025 COMPLETE CBC W/AUTO DIFF WBC: CPT

## 2019-12-24 PROCEDURE — 99213 OFFICE O/P EST LOW 20 MIN: CPT | Mod: PBBFAC,25 | Performed by: INTERNAL MEDICINE

## 2019-12-24 PROCEDURE — 86850 RBC ANTIBODY SCREEN: CPT

## 2019-12-24 PROCEDURE — 99999 PR PBB SHADOW E&M-EST. PATIENT-LVL III: ICD-10-PCS | Mod: PBBFAC,,, | Performed by: INTERNAL MEDICINE

## 2019-12-24 RX ORDER — OXYCODONE AND ACETAMINOPHEN 10; 325 MG/1; MG/1
1 TABLET ORAL EVERY 4 HOURS PRN
Qty: 120 TABLET | Refills: 0 | Status: SHIPPED | OUTPATIENT
Start: 2019-12-24 | End: 2020-01-20 | Stop reason: SDUPTHER

## 2019-12-24 RX ORDER — CARVEDILOL 25 MG/1
25 TABLET ORAL 2 TIMES DAILY
Qty: 60 TABLET | Refills: 11 | Status: SHIPPED | OUTPATIENT
Start: 2019-12-24 | End: 2020-12-07 | Stop reason: SDUPTHER

## 2019-12-24 NOTE — PROGRESS NOTES
SECTION OF HEMATOLOGY AND BONE MARROW TRANSPLANT  Return  Patient Visit   2019  Referred by:  No ref. provider found  Referred for: sickle beta thal     CHIEF COMPLAINT:   No chief complaint on file.      HISTORY OF PRESENT ILLNESS:   39 y.o. 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  She established care with me in 2017.    She  moved around country ([place], [place], [place], now back in [place] permanently) and had hematologists taking care of her in each of these respective cities.  Her disease is notable for 1-3 VOC a year requiring ED presentation and admission.  She has episode of acute chest syndrome as young girl.  States she had subclinical stroke with no residual deficits.  Has never been on hydrea. Has history of HTN. Has 2 children.  Trained as LPN . Has only required rare transfusion over the course of her life.    Since our last appt, notes stable VOC  Pain crises frequently.          Today patient feels well.   Notes elevated sbp at home. Otherwise no acute issues.          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 She has never used smokeless tobacco. She reports that she does not drink alcohol or use drugs.    FAMILY HISTORY:  Family History   Problem Relation Age of Onset    Heart disease  Mother     Diabetes Mother     Heart disease Father     Breast cancer Neg Hx     Ovarian cancer Neg Hx     Colon cancer Neg Hx        CURRENT MEDICATIONS:   Current Outpatient Medications   Medication Sig    acetaminophen (TYLENOL EXTRA STRENGTH) 500 MG tablet Take 1,000 mg by mouth daily as needed for Pain.    albuterol (VENTOLIN HFA) 90 mcg/actuation inhaler Inhale 2 puffs into the lungs every 6 (six) hours as needed for Wheezing or Shortness of Breath. Rescue    albuterol-ipratropium (DUO-NEB) 2.5 mg-0.5 mg/3 mL nebulizer solution Take 3 mLs by nebulization every 6 (six) hours as needed for Wheezing or Shortness of Breath. Rescue    amLODIPine (NORVASC) 10 MG tablet TAKE 1 TABLET BY MOUTH EVERY DAY    ascorbic acid, vitamin C, (VITAMIN C) 500 MG tablet Take 500 mg by mouth once daily.    budesonide-formoterol 80-4.5 mcg (SYMBICORT) 80-4.5 mcg/actuation HFAA Inhale 2 puffs into the lungs 2 (two) times daily. Controller    calcium carbonate (TUMS) 200 mg calcium (500 mg) chewable tablet Take 1 tablet by mouth daily as needed for Heartburn (stomach).    carBAMazepine (TEGRETOL) 200 mg tablet Take 4 tablets (800 mg total) by mouth 2 (two) times daily.    carvedilol (COREG) 12.5 MG tablet Take 2 tablets (25 mg total) by mouth 2 (two) times daily with meals.    cyclobenzaprine (FLEXERIL) 10 MG tablet Take 1 tablet (10 mg total) by mouth 3 (three) times daily as needed for Muscle spasms.    ergocalciferol (VITAMIN D2) 50,000 unit Cap Take 1 capsule (50,000 Units total) by mouth every 7 days. (Wednesdays)    FLUoxetine 40 MG capsule TAKE 1 CAPSULE BY MOUTH EVERY DAY    fluticasone (FLONASE) 50 mcg/actuation nasal spray 2 sprays (100 mcg total) by Each Nare route once daily.    gabapentin (NEURONTIN) 800 MG tablet Take 1 tablet (800 mg total) by mouth 2 (two) times daily.    hydroCHLOROthiazide (HYDRODIURIL) 25 MG tablet Take 1 tablet (25 mg total) by mouth once daily.    ondansetron (ZOFRAN-ODT) 8 MG  TbDL Dissolve 1 tablet (8 mg total) by mouth every 6 (six) hours as needed.    oxyCODONE-acetaminophen (PERCOCET)  mg per tablet Take 1 tablet by mouth every 4 (four) hours as needed for Pain.    prochlorperazine (COMPAZINE) 10 MG tablet Take 1 tablet (10 mg total) by mouth every 6 (six) hours as needed.    senna-docusate 8.6-50 mg (PERICOLACE) 8.6-50 mg per tablet Take 1 tablet by mouth 2 (two) times daily as needed for Constipation.     No current facility-administered medications for this visit.      ALLERGIES:   Review of patient's allergies indicates:  No Known Allergies      REVIEW OF SYSTEMS:   General ROS: negative  Psychological ROS: negative  Ophthalmic ROS: negative  ENT ROS: negative  Allergy and Immunology ROS: negative  Hematological and Lymphatic ROS: negative  Endocrine ROS: negative  Respiratory ROS: negative  Cardiovascular ROS: negative  Gastrointestinal ROS: negative  Genito-Urinary ROS: negative  Musculoskeletal ROS: see HPI  Neurological ROS: negative  Dermatological ROS: negative    PHYSICAL EXAM:   Vitals:    12/24/19 0827   BP: (!) 141/81   Pulse: 81   Resp: 16   Temp: 98.4 °F (36.9 °C)       General - well developed, well nourished, no apparent distress, Obese  Head & Face - no sinus tenderness  Eyes - normal conjunctivae and lids   ENT - normal external auditory canals and tympanic membranes bilaterally oropharynx clear,  Normal dentition and gums  Neck - normal thyroid  Chest and Lung - normal respiratory effort, clear to auscultation bilaterally   Cardiovascular - RRR with no MGR, normal S1 and S2; no pedal edema  Abdomen -  soft, nontender, no palpable hepatomegaly or splenomegaly  Lymph - no palpable lymphadenopathy  Extremities - unremarkable nails and digits  Heme - no bruising, petechiae, pallor  Skin - no rashes or lesions  Psych - appropriate mood and affect      ECOG Performance Status: (foot note - ECOG PS provided by Eastern Cooperative Oncology Group) 1 - Symptomatic  but completely ambulatory    Karnofsky Performance Score:  90%- Able to Carry on Normal Activity: Minor Symptoms of Disease  DATA:   Lab Results   Component Value Date    WBC 9.80 08/04/2019    HGB 10.2 (L) 08/04/2019    HCT 31.9 (L) 08/04/2019    MCV 66 (L) 08/04/2019     08/04/2019     Gran # (ANC)   Date Value Ref Range Status   08/04/2019 3.6 1.8 - 7.7 K/uL Final     Gran%   Date Value Ref Range Status   08/04/2019 36.8 (L) 38.0 - 73.0 % Final     Lymph #   Date Value Ref Range Status   08/04/2019 5.1 (H) 1.0 - 4.8 K/uL Final     Lymph%   Date Value Ref Range Status   08/04/2019 52.4 (H) 18.0 - 48.0 % Final     CMP  Sodium   Date Value Ref Range Status   08/04/2019 138 136 - 145 mmol/L Final     Potassium   Date Value Ref Range Status   08/04/2019 3.8 3.5 - 5.1 mmol/L Final     Chloride   Date Value Ref Range Status   08/04/2019 103 95 - 110 mmol/L Final     CO2   Date Value Ref Range Status   08/04/2019 25 23 - 29 mmol/L Final     Glucose   Date Value Ref Range Status   08/04/2019 113 (H) 70 - 110 mg/dL Final     BUN, Bld   Date Value Ref Range Status   08/04/2019 13 6 - 20 mg/dL Final     Creatinine   Date Value Ref Range Status   08/04/2019 0.8 0.5 - 1.4 mg/dL Final     Calcium   Date Value Ref Range Status   08/04/2019 8.3 (L) 8.7 - 10.5 mg/dL Final     Total Protein   Date Value Ref Range Status   08/04/2019 7.3 6.0 - 8.4 g/dL Final     Albumin   Date Value Ref Range Status   08/04/2019 3.3 (L) 3.5 - 5.2 g/dL Final     Total Bilirubin   Date Value Ref Range Status   08/04/2019 0.2 0.1 - 1.0 mg/dL Final     Comment:     For infants and newborns, interpretation of results should be based  on gestational age, weight and in agreement with clinical  observations.  Premature Infant recommended reference ranges:  Up to 24 hours.............<8.0 mg/dL  Up to 48 hours............<12.0 mg/dL  3-5 days..................<15.0 mg/dL  6-29 days.................<15.0 mg/dL       Alkaline Phosphatase   Date Value  Ref Range Status   08/04/2019 86 55 - 135 U/L Final     AST   Date Value Ref Range Status   08/04/2019 20 10 - 40 U/L Final     ALT   Date Value Ref Range Status   08/04/2019 16 10 - 44 U/L Final     Anion Gap   Date Value Ref Range Status   08/04/2019 10 8 - 16 mmol/L Final     eGFR if    Date Value Ref Range Status   08/04/2019 >60.0 >60 mL/min/1.73 m^2 Final     eGFR if non    Date Value Ref Range Status   08/04/2019 >60.0 >60 mL/min/1.73 m^2 Final     Comment:     Calculation used to obtain the estimated glomerular filtration  rate (eGFR) is the CKD-EPI equation.        Quant 2.2 - 3.2 % 5.9    Hemoglobin Bands   Hb A , Hb S , Hb F , Hb A2   Hemoglobin Electrophoresis Interp   See comment   Comments: There are prominent bands in the A and S positions,   measuring approximately 20 % and 66 % respectively with a hemoglobin   F band of approximately 8% and an elevated hemoglobin A2.     This pattern suggests sickle cell/beta + thalassemia, provided that   there is no history of recent RBC transfusion.  Await acid   electrophoresis   for confirmation of hemoglobin S.   Interpreted by Violette Sutherland M.D.          ASSESSMENT AND PLAN:   No diagnosis found.  -patient has sickle beta thallassemia   Sickle Cell Disease Monitoring   1)Hydrea  -previously 1-3 crises a year; per patient with increasing severity and frequency over last 2-3 years   -initiate hydrea 500mg BID (5/22/17) but she quickly self discontinued due to mild rash; she refuses to restart.  -discussed  l-glutamine today and patient would like to attempt; prescribed  15 g BID (8/20/18) but insurance refused to cover.  - discussed IVF PRN in infusion center  - reports pain crisis once every 1x month managed at home    2)iron status   -history of iron deficiency from menorrhagnia, likely contributing to fatigue/anemia  -again low today; repeat feraheme x 2  -encourage pt to fu with ob/gyn    3)AVN  -has chronic bilateral  hip pain; stable  -will discuss obtaining MRI if pain worsens    4)LE Ulcerations   NA    5)HTN  -continue norvasc 10; takes nightly  - continue hctz to 25mg daily (8/20/18)  ; takes nightly  - Coreg 12.5mg BID start today (5/21/19); increase to 25mg bid given htn; careful attn to asthma exacerbating  -BP check  At home  - Consider adding Nifedipine 20mg extended release QHS at next visit if BP still elevated      6)Opthalmic  -encouraged her to make appt with ophthalmology; states she will make appt    7)Pain  -continue home percocet 10 q 6 hrs     8)CardioPulmonary  -states  Had normal TTE jan 2019; next due jan 2021  -continue inhalers    9)Renal  -april 2019 UA with no proteinuria; next due April 2020    10)Neuro/CVA  -gives anecdotal history of subclinical stroke with no deficits  -will monitor    11)Vaccines   -receiving HIB series, menactra, prevnar followed by pneumovax      12)Contraception  -she has had tubal ligation     -encouraged her to set up  pcp and gyne appts     Follow Up:  --please schedule weekly feraheme infusions x 2 doses in next 1-2 weeks  -cbc, cmp, retic, ferritin, folate, type and screen, and NP appt in 4 months

## 2019-12-24 NOTE — Clinical Note
Please change previous fu to below---please schedule weekly feraheme infusions x 2 doses in next 1-2 weeks-cbc, cmp, retic, ferritin, folate, type and screen, and NP appt in 4 months

## 2019-12-27 ENCOUNTER — PATIENT MESSAGE (OUTPATIENT)
Dept: HEMATOLOGY/ONCOLOGY | Facility: CLINIC | Age: 39
End: 2019-12-27

## 2019-12-27 PROBLEM — D50.0 IRON DEFICIENCY ANEMIA DUE TO CHRONIC BLOOD LOSS: Status: ACTIVE | Noted: 2019-12-27

## 2019-12-27 RX ORDER — HEPARIN 100 UNIT/ML
500 SYRINGE INTRAVENOUS
Status: CANCELLED | OUTPATIENT
Start: 2020-01-08

## 2019-12-27 RX ORDER — SODIUM CHLORIDE 0.9 % (FLUSH) 0.9 %
10 SYRINGE (ML) INJECTION
Status: CANCELLED | OUTPATIENT
Start: 2020-01-08

## 2019-12-27 RX ORDER — HEPARIN 100 UNIT/ML
500 SYRINGE INTRAVENOUS
Status: CANCELLED | OUTPATIENT
Start: 2020-01-15

## 2019-12-27 RX ORDER — SODIUM CHLORIDE 0.9 % (FLUSH) 0.9 %
10 SYRINGE (ML) INJECTION
Status: CANCELLED | OUTPATIENT
Start: 2020-01-15

## 2019-12-30 ENCOUNTER — TELEPHONE (OUTPATIENT)
Dept: HEMATOLOGY/ONCOLOGY | Facility: CLINIC | Age: 39
End: 2019-12-30

## 2019-12-30 NOTE — TELEPHONE ENCOUNTER
----- Message from Susannah Hurt MA sent at 12/30/2019  8:22 AM CST -----  Contact: april with bc/bs. [phone]   april with bc/bs. [phone]   Pt's insurance has been updated and able to proceed with the test dr goodman  Is needing  to order

## 2020-01-07 ENCOUNTER — INFUSION (OUTPATIENT)
Dept: INFUSION THERAPY | Facility: HOSPITAL | Age: 40
End: 2020-01-07
Attending: INTERNAL MEDICINE
Payer: COMMERCIAL

## 2020-01-07 VITALS
TEMPERATURE: 98 F | DIASTOLIC BLOOD PRESSURE: 88 MMHG | SYSTOLIC BLOOD PRESSURE: 166 MMHG | HEART RATE: 84 BPM | RESPIRATION RATE: 15 BRPM

## 2020-01-07 DIAGNOSIS — D50.0 IRON DEFICIENCY ANEMIA DUE TO CHRONIC BLOOD LOSS: Primary | ICD-10-CM

## 2020-01-07 DIAGNOSIS — D50.9 IRON DEFICIENCY ANEMIA, UNSPECIFIED IRON DEFICIENCY ANEMIA TYPE: ICD-10-CM

## 2020-01-07 DIAGNOSIS — D57.439 SICKLE CELL DISEASE, TYPE S BETA-ZERO THALASSEMIA WITH CRISIS: ICD-10-CM

## 2020-01-07 DIAGNOSIS — D57.00 SICKLE-CELL DISEASE WITH PAIN: ICD-10-CM

## 2020-01-07 PROCEDURE — 63600175 PHARM REV CODE 636 W HCPCS: Performed by: INTERNAL MEDICINE

## 2020-01-07 PROCEDURE — 96374 THER/PROPH/DIAG INJ IV PUSH: CPT

## 2020-01-07 RX ORDER — SODIUM CHLORIDE 0.9 % (FLUSH) 0.9 %
10 SYRINGE (ML) INJECTION
Status: DISCONTINUED | OUTPATIENT
Start: 2020-01-07 | End: 2020-01-07 | Stop reason: HOSPADM

## 2020-01-07 RX ORDER — HEPARIN 100 UNIT/ML
500 SYRINGE INTRAVENOUS
Status: DISCONTINUED | OUTPATIENT
Start: 2020-01-07 | End: 2020-01-07 | Stop reason: HOSPADM

## 2020-01-07 RX ADMIN — FERUMOXYTOL 510 MG: 510 INJECTION INTRAVENOUS at 04:01

## 2020-01-07 NOTE — PLAN OF CARE
Patient in clinic for feraheme infusion. VS monitored prior and post infusion. Patient monitored for 30 minutes post infusion for potential adverse reaction to medication. Patient educated to notify nursing for any of the following symptoms: swelling, SOB, rash, or dizziness. AVS declined. PIV removed with catheter tip intact. Discharged home.

## 2020-01-09 ENCOUNTER — HOSPITAL ENCOUNTER (INPATIENT)
Facility: HOSPITAL | Age: 40
LOS: 10 days | Discharge: HOME | DRG: 812 | End: 2020-01-19
Attending: EMERGENCY MEDICINE | Admitting: HOSPITALIST
Payer: COMMERCIAL

## 2020-01-09 DIAGNOSIS — D57.00 SICKLE CELL PAIN CRISIS: Primary | ICD-10-CM

## 2020-01-09 DIAGNOSIS — R06.02 SHORTNESS OF BREATH: ICD-10-CM

## 2020-01-09 DIAGNOSIS — N92.0 MENORRHAGIA WITH REGULAR CYCLE: ICD-10-CM

## 2020-01-09 DIAGNOSIS — D57.1 SICKLE CELL ANEMIA: ICD-10-CM

## 2020-01-09 DIAGNOSIS — R07.9 CHEST PAIN: ICD-10-CM

## 2020-01-09 LAB
ALBUMIN SERPL BCP-MCNC: 3.7 G/DL (ref 3.5–5.2)
ALP SERPL-CCNC: 104 U/L (ref 55–135)
ALT SERPL W/O P-5'-P-CCNC: 24 U/L (ref 10–44)
ANION GAP SERPL CALC-SCNC: 8 MMOL/L (ref 8–16)
AST SERPL-CCNC: 40 U/L (ref 10–40)
B-HCG UR QL: NEGATIVE
BACTERIA #/AREA URNS AUTO: ABNORMAL /HPF
BASOPHILS # BLD AUTO: 0.02 K/UL (ref 0–0.2)
BASOPHILS NFR BLD: 0.3 % (ref 0–1.9)
BILIRUB SERPL-MCNC: 0.2 MG/DL (ref 0.1–1)
BILIRUB UR QL STRIP: NEGATIVE
BUN SERPL-MCNC: 6 MG/DL (ref 6–20)
CALCIUM SERPL-MCNC: 9.4 MG/DL (ref 8.7–10.5)
CHLORIDE SERPL-SCNC: 106 MMOL/L (ref 95–110)
CLARITY UR REFRACT.AUTO: ABNORMAL
CO2 SERPL-SCNC: 28 MMOL/L (ref 23–29)
COLOR UR AUTO: ABNORMAL
CREAT SERPL-MCNC: 0.8 MG/DL (ref 0.5–1.4)
CTP QC/QA: YES
DIFFERENTIAL METHOD: ABNORMAL
EOSINOPHIL # BLD AUTO: 0 K/UL (ref 0–0.5)
EOSINOPHIL NFR BLD: 0 % (ref 0–8)
ERYTHROCYTE [DISTWIDTH] IN BLOOD BY AUTOMATED COUNT: 20.7 % (ref 11.5–14.5)
EST. GFR  (AFRICAN AMERICAN): >60 ML/MIN/1.73 M^2
EST. GFR  (NON AFRICAN AMERICAN): >60 ML/MIN/1.73 M^2
GLUCOSE SERPL-MCNC: 132 MG/DL (ref 70–110)
GLUCOSE UR QL STRIP: NEGATIVE
HCT VFR BLD AUTO: 32.8 % (ref 37–48.5)
HGB BLD-MCNC: 9.9 G/DL (ref 12–16)
HGB UR QL STRIP: ABNORMAL
HYALINE CASTS UR QL AUTO: 0 /LPF
IMM GRANULOCYTES # BLD AUTO: 0.03 K/UL (ref 0–0.04)
IMM GRANULOCYTES NFR BLD AUTO: 0.5 % (ref 0–0.5)
KETONES UR QL STRIP: NEGATIVE
LEUKOCYTE ESTERASE UR QL STRIP: ABNORMAL
LYMPHOCYTES # BLD AUTO: 2.3 K/UL (ref 1–4.8)
LYMPHOCYTES NFR BLD: 35.8 % (ref 18–48)
MCH RBC QN AUTO: 19 PG (ref 27–31)
MCHC RBC AUTO-ENTMCNC: 30.2 G/DL (ref 32–36)
MCV RBC AUTO: 63 FL (ref 82–98)
MICROSCOPIC COMMENT: ABNORMAL
MONOCYTES # BLD AUTO: 0.6 K/UL (ref 0.3–1)
MONOCYTES NFR BLD: 9.1 % (ref 4–15)
NEUTROPHILS # BLD AUTO: 3.5 K/UL (ref 1.8–7.7)
NEUTROPHILS NFR BLD: 54.3 % (ref 38–73)
NITRITE UR QL STRIP: NEGATIVE
NRBC BLD-RTO: 1 /100 WBC
PH UR STRIP: 6 [PH] (ref 5–8)
PLATELET # BLD AUTO: 285 K/UL (ref 150–350)
PMV BLD AUTO: 9.5 FL (ref 9.2–12.9)
POTASSIUM SERPL-SCNC: 3.2 MMOL/L (ref 3.5–5.1)
PROT SERPL-MCNC: 8.1 G/DL (ref 6–8.4)
PROT UR QL STRIP: ABNORMAL
RBC # BLD AUTO: 5.22 M/UL (ref 4–5.4)
RBC #/AREA URNS AUTO: >100 /HPF (ref 0–4)
RETICS/RBC NFR AUTO: 2 % (ref 0.5–2.5)
SODIUM SERPL-SCNC: 142 MMOL/L (ref 136–145)
SP GR UR STRIP: 1 (ref 1–1.03)
SQUAMOUS #/AREA URNS AUTO: 1 /HPF
URN SPEC COLLECT METH UR: ABNORMAL
WBC # BLD AUTO: 6.48 K/UL (ref 3.9–12.7)
WBC #/AREA URNS AUTO: 2 /HPF (ref 0–5)

## 2020-01-09 PROCEDURE — 96374 THER/PROPH/DIAG INJ IV PUSH: CPT

## 2020-01-09 PROCEDURE — 99285 EMERGENCY DEPT VISIT HI MDM: CPT | Mod: ,,, | Performed by: EMERGENCY MEDICINE

## 2020-01-09 PROCEDURE — 96376 TX/PRO/DX INJ SAME DRUG ADON: CPT

## 2020-01-09 PROCEDURE — 80053 COMPREHEN METABOLIC PANEL: CPT

## 2020-01-09 PROCEDURE — 96375 TX/PRO/DX INJ NEW DRUG ADDON: CPT

## 2020-01-09 PROCEDURE — 96361 HYDRATE IV INFUSION ADD-ON: CPT

## 2020-01-09 PROCEDURE — 85045 AUTOMATED RETICULOCYTE COUNT: CPT

## 2020-01-09 PROCEDURE — 63600175 PHARM REV CODE 636 W HCPCS: Performed by: PHYSICIAN ASSISTANT

## 2020-01-09 PROCEDURE — 25000003 PHARM REV CODE 250: Performed by: PHYSICIAN ASSISTANT

## 2020-01-09 PROCEDURE — 99285 EMERGENCY DEPT VISIT HI MDM: CPT | Mod: 25

## 2020-01-09 PROCEDURE — 81025 URINE PREGNANCY TEST: CPT | Performed by: PHYSICIAN ASSISTANT

## 2020-01-09 PROCEDURE — 85025 COMPLETE CBC W/AUTO DIFF WBC: CPT

## 2020-01-09 PROCEDURE — 81001 URINALYSIS AUTO W/SCOPE: CPT

## 2020-01-09 PROCEDURE — 12000002 HC ACUTE/MED SURGE SEMI-PRIVATE ROOM

## 2020-01-09 PROCEDURE — 99285 PR EMERGENCY DEPT VISIT,LEVEL V: ICD-10-PCS | Mod: ,,, | Performed by: EMERGENCY MEDICINE

## 2020-01-09 RX ORDER — ONDANSETRON 2 MG/ML
4 INJECTION INTRAMUSCULAR; INTRAVENOUS
Status: COMPLETED | OUTPATIENT
Start: 2020-01-09 | End: 2020-01-09

## 2020-01-09 RX ORDER — HYDROMORPHONE HYDROCHLORIDE 1 MG/ML
1 INJECTION, SOLUTION INTRAMUSCULAR; INTRAVENOUS; SUBCUTANEOUS
Status: COMPLETED | OUTPATIENT
Start: 2020-01-09 | End: 2020-01-09

## 2020-01-09 RX ORDER — POTASSIUM CHLORIDE 20 MEQ/1
40 TABLET, EXTENDED RELEASE ORAL
Status: COMPLETED | OUTPATIENT
Start: 2020-01-09 | End: 2020-01-09

## 2020-01-09 RX ORDER — DIPHENHYDRAMINE HCL 50 MG
50 CAPSULE ORAL
Status: COMPLETED | OUTPATIENT
Start: 2020-01-09 | End: 2020-01-09

## 2020-01-09 RX ADMIN — SODIUM CHLORIDE 1000 ML: 0.9 INJECTION, SOLUTION INTRAVENOUS at 08:01

## 2020-01-09 RX ADMIN — HYDROMORPHONE HYDROCHLORIDE 1 MG: 1 INJECTION, SOLUTION INTRAMUSCULAR; INTRAVENOUS; SUBCUTANEOUS at 10:01

## 2020-01-09 RX ADMIN — SODIUM CHLORIDE 1000 ML: 0.9 INJECTION, SOLUTION INTRAVENOUS at 10:01

## 2020-01-09 RX ADMIN — POTASSIUM CHLORIDE 40 MEQ: 1500 TABLET, EXTENDED RELEASE ORAL at 09:01

## 2020-01-09 RX ADMIN — DIPHENHYDRAMINE HYDROCHLORIDE 50 MG: 50 CAPSULE ORAL at 08:01

## 2020-01-09 RX ADMIN — HYDROMORPHONE HYDROCHLORIDE 1 MG: 1 INJECTION, SOLUTION INTRAMUSCULAR; INTRAVENOUS; SUBCUTANEOUS at 08:01

## 2020-01-09 RX ADMIN — ONDANSETRON 4 MG: 2 INJECTION INTRAMUSCULAR; INTRAVENOUS at 08:01

## 2020-01-09 NOTE — LETTER
January 19, 2020         [address]  [place] [address]  Phone: [phone]  Fax: [phone]       Patient: Nazanin Malone   YOB: 1980  Date of Visit: 01/19/2020    To Whom It May Concern:    Rachel Malone  was at [facility] on 1/10/2019 to  01/19/2020. She may return to work/school on 1/21/2019 with no restrictions. If you have any questions or concerns, or if I can be of further assistance, please do not hesitate to contact me.    Sincerely,    Waldemar Duffy MD

## 2020-01-10 PROBLEM — E87.6 HYPOKALEMIA: Status: ACTIVE | Noted: 2020-01-10

## 2020-01-10 PROBLEM — R06.02 SHORTNESS OF BREATH: Status: ACTIVE | Noted: 2020-01-10

## 2020-01-10 PROBLEM — E53.8 FOLATE DEFICIENCY: Status: ACTIVE | Noted: 2020-01-10

## 2020-01-10 LAB
ABO + RH BLD: NORMAL
ADENOVIRUS: NOT DETECTED
ALBUMIN SERPL BCP-MCNC: 3.6 G/DL (ref 3.5–5.2)
ALP SERPL-CCNC: 98 U/L (ref 55–135)
ALT SERPL W/O P-5'-P-CCNC: 23 U/L (ref 10–44)
ANION GAP SERPL CALC-SCNC: 9 MMOL/L (ref 8–16)
APTT BLDCRRT: 22.8 SEC (ref 21–32)
ASCENDING AORTA: 3.19 CM
AST SERPL-CCNC: 32 U/L (ref 10–40)
AV INDEX (PROSTH): 0.48
AV MEAN GRADIENT: 6 MMHG
AV PEAK GRADIENT: 10 MMHG
AV VALVE AREA: 2.08 CM2
AV VELOCITY RATIO: 0.43
BASOPHILS # BLD AUTO: 0.02 K/UL (ref 0–0.2)
BASOPHILS # BLD AUTO: 0.04 K/UL (ref 0–0.2)
BASOPHILS NFR BLD: 0.3 % (ref 0–1.9)
BASOPHILS NFR BLD: 0.7 % (ref 0–1.9)
BILIRUB SERPL-MCNC: 0.3 MG/DL (ref 0.1–1)
BLD GP AB SCN CELLS X3 SERPL QL: NORMAL
BNP SERPL-MCNC: 48 PG/ML (ref 0–99)
BORDETELLA PARAPERTUSSIS (IS1001): NOT DETECTED
BORDETELLA PERTUSSIS (PTXP): NOT DETECTED
BSA FOR ECHO PROCEDURE: 2.54 M2
BUN SERPL-MCNC: 5 MG/DL (ref 6–20)
CALCIUM SERPL-MCNC: 8.6 MG/DL (ref 8.7–10.5)
CHLAMYDIA PNEUMONIAE: NOT DETECTED
CHLORIDE SERPL-SCNC: 106 MMOL/L (ref 95–110)
CK SERPL-CCNC: 116 U/L (ref 20–180)
CO2 SERPL-SCNC: 26 MMOL/L (ref 23–29)
CORONAVIRUS 229E, COMMON COLD VIRUS: NOT DETECTED
CORONAVIRUS HKU1, COMMON COLD VIRUS: NOT DETECTED
CORONAVIRUS NL63, COMMON COLD VIRUS: NOT DETECTED
CORONAVIRUS OC43, COMMON COLD VIRUS: NOT DETECTED
CREAT SERPL-MCNC: 0.7 MG/DL (ref 0.5–1.4)
CV ECHO LV RWT: 0.48 CM
DIFFERENTIAL METHOD: ABNORMAL
DIFFERENTIAL METHOD: ABNORMAL
DOP CALC AO PEAK VEL: 1.55 M/S
DOP CALC AO VTI: 30.38 CM
DOP CALC LVOT AREA: 4.3 CM2
DOP CALC LVOT DIAMETER: 2.35 CM
DOP CALC LVOT PEAK VEL: 0.67 M/S
DOP CALC LVOT STROKE VOLUME: 63.16 CM3
DOP CALCLVOT PEAK VEL VTI: 14.57 CM
E WAVE DECELERATION TIME: 204.22 MSEC
E/A RATIO: 1.16
E/E' RATIO: 14.63 M/S
ECHO LV POSTERIOR WALL: 1.3 CM (ref 0.6–1.1)
EOSINOPHIL # BLD AUTO: 0 K/UL (ref 0–0.5)
EOSINOPHIL # BLD AUTO: 0 K/UL (ref 0–0.5)
EOSINOPHIL NFR BLD: 0 % (ref 0–8)
EOSINOPHIL NFR BLD: 0 % (ref 0–8)
ERYTHROCYTE [DISTWIDTH] IN BLOOD BY AUTOMATED COUNT: 20.2 % (ref 11.5–14.5)
ERYTHROCYTE [DISTWIDTH] IN BLOOD BY AUTOMATED COUNT: 20.4 % (ref 11.5–14.5)
EST. GFR  (AFRICAN AMERICAN): >60 ML/MIN/1.73 M^2
EST. GFR  (NON AFRICAN AMERICAN): >60 ML/MIN/1.73 M^2
FERRITIN SERPL-MCNC: 306 NG/ML (ref 20–300)
FLUBV RNA NPH QL NAA+NON-PROBE: NOT DETECTED
FRACTIONAL SHORTENING: 33 % (ref 28–44)
GLUCOSE SERPL-MCNC: 95 MG/DL (ref 70–110)
HCT VFR BLD AUTO: 30.3 % (ref 37–48.5)
HCT VFR BLD AUTO: 30.4 % (ref 37–48.5)
HGB BLD-MCNC: 8.8 G/DL (ref 12–16)
HGB BLD-MCNC: 9 G/DL (ref 12–16)
HPIV1 RNA NPH QL NAA+NON-PROBE: NOT DETECTED
HPIV2 RNA NPH QL NAA+NON-PROBE: NOT DETECTED
HPIV3 RNA NPH QL NAA+NON-PROBE: NOT DETECTED
HPIV4 RNA NPH QL NAA+NON-PROBE: NOT DETECTED
HUMAN METAPNEUMOVIRUS: NOT DETECTED
IMM GRANULOCYTES # BLD AUTO: 0.02 K/UL (ref 0–0.04)
IMM GRANULOCYTES # BLD AUTO: 0.03 K/UL (ref 0–0.04)
IMM GRANULOCYTES NFR BLD AUTO: 0.3 % (ref 0–0.5)
IMM GRANULOCYTES NFR BLD AUTO: 0.5 % (ref 0–0.5)
INFLUENZA A (SUBTYPES H1,H1-2009,H3): NOT DETECTED
INFLUENZA A, MOLECULAR: NEGATIVE
INFLUENZA B, MOLECULAR: NEGATIVE
INR PPP: 1 (ref 0.8–1.2)
INTERVENTRICULAR SEPTUM: 1.2 CM (ref 0.6–1.1)
LA MAJOR: 4.95 CM
LA MINOR: 4.68 CM
LA WIDTH: 3.62 CM
LDH SERPL L TO P-CCNC: 254 U/L (ref 110–260)
LEFT ATRIUM SIZE: 3.8 CM
LEFT ATRIUM VOLUME INDEX: 24 ML/M2
LEFT ATRIUM VOLUME: 56.26 CM3
LEFT INTERNAL DIMENSION IN SYSTOLE: 3.6 CM (ref 2.1–4)
LEFT VENTRICLE DIASTOLIC VOLUME INDEX: 72.49 ML/M2
LEFT VENTRICLE DIASTOLIC VOLUME: 170.1 ML
LEFT VENTRICLE MASS INDEX: 119 G/M2
LEFT VENTRICLE SYSTOLIC VOLUME INDEX: 23.1 ML/M2
LEFT VENTRICLE SYSTOLIC VOLUME: 54.29 ML
LEFT VENTRICULAR INTERNAL DIMENSION IN DIASTOLE: 5.4 CM (ref 3.5–6)
LEFT VENTRICULAR MASS: 279.8 G
LV LATERAL E/E' RATIO: 14.63 M/S
LV SEPTAL E/E' RATIO: 14.63 M/S
LYMPHOCYTES # BLD AUTO: 2.4 K/UL (ref 1–4.8)
LYMPHOCYTES # BLD AUTO: 2.7 K/UL (ref 1–4.8)
LYMPHOCYTES NFR BLD: 36.6 % (ref 18–48)
LYMPHOCYTES NFR BLD: 41.8 % (ref 18–48)
MAGNESIUM SERPL-MCNC: 2 MG/DL (ref 1.6–2.6)
MCH RBC QN AUTO: 18.8 PG (ref 27–31)
MCH RBC QN AUTO: 18.8 PG (ref 27–31)
MCHC RBC AUTO-ENTMCNC: 29 G/DL (ref 32–36)
MCHC RBC AUTO-ENTMCNC: 29.6 G/DL (ref 32–36)
MCV RBC AUTO: 64 FL (ref 82–98)
MCV RBC AUTO: 65 FL (ref 82–98)
MONOCYTES # BLD AUTO: 0.5 K/UL (ref 0.3–1)
MONOCYTES # BLD AUTO: 0.7 K/UL (ref 0.3–1)
MONOCYTES NFR BLD: 9.3 % (ref 4–15)
MONOCYTES NFR BLD: 9.7 % (ref 4–15)
MV PEAK A VEL: 1.01 M/S
MV PEAK E VEL: 1.17 M/S
MYCOPLASMA PNEUMONIAE: NOT DETECTED
NEUTROPHILS # BLD AUTO: 2.7 K/UL (ref 1.8–7.7)
NEUTROPHILS # BLD AUTO: 3.9 K/UL (ref 1.8–7.7)
NEUTROPHILS NFR BLD: 47.7 % (ref 38–73)
NEUTROPHILS NFR BLD: 53.1 % (ref 38–73)
NRBC BLD-RTO: 1 /100 WBC
NRBC BLD-RTO: 2 /100 WBC
PHOSPHATE SERPL-MCNC: 3.2 MG/DL (ref 2.7–4.5)
PLATELET # BLD AUTO: 257 K/UL (ref 150–350)
PLATELET # BLD AUTO: 260 K/UL (ref 150–350)
PMV BLD AUTO: 10.6 FL (ref 9.2–12.9)
PMV BLD AUTO: 9.6 FL (ref 9.2–12.9)
POTASSIUM SERPL-SCNC: 3.3 MMOL/L (ref 3.5–5.1)
PROT SERPL-MCNC: 7.6 G/DL (ref 6–8.4)
PROTHROMBIN TIME: 10.4 SEC (ref 9–12.5)
PULM VEIN S/D RATIO: 1.66
PV PEAK D VEL: 0.32 M/S
PV PEAK S VEL: 0.53 M/S
RA MAJOR: 4.14 CM
RA PRESSURE: 3 MMHG
RA WIDTH: 3.22 CM
RBC # BLD AUTO: 4.67 M/UL (ref 4–5.4)
RBC # BLD AUTO: 4.78 M/UL (ref 4–5.4)
RESPIRATORY INFECTION PANEL SOURCE: NORMAL
RIGHT VENTRICULAR END-DIASTOLIC DIMENSION: 3.76 CM
RSV RNA NPH QL NAA+NON-PROBE: NOT DETECTED
RV TISSUE DOPPLER FREE WALL SYSTOLIC VELOCITY 1 (APICAL 4 CHAMBER VIEW): 12.36 CM/S
RV+EV RNA NPH QL NAA+NON-PROBE: NOT DETECTED
SINUS: 2.94 CM
SODIUM SERPL-SCNC: 141 MMOL/L (ref 136–145)
SPECIMEN SOURCE: NORMAL
STJ: 2.71 CM
TDI LATERAL: 0.08 M/S
TDI SEPTAL: 0.08 M/S
TDI: 0.08 M/S
WBC # BLD AUTO: 5.72 K/UL (ref 3.9–12.7)
WBC # BLD AUTO: 7.32 K/UL (ref 3.9–12.7)

## 2020-01-10 PROCEDURE — 87798 DETECT AGENT NOS DNA AMP: CPT

## 2020-01-10 PROCEDURE — 82728 ASSAY OF FERRITIN: CPT

## 2020-01-10 PROCEDURE — 99253 PR INITIAL INPATIENT CONSULT,LEVL III: ICD-10-PCS | Mod: ,,, | Performed by: OBSTETRICS & GYNECOLOGY

## 2020-01-10 PROCEDURE — 63600175 PHARM REV CODE 636 W HCPCS: Performed by: STUDENT IN AN ORGANIZED HEALTH CARE EDUCATION/TRAINING PROGRAM

## 2020-01-10 PROCEDURE — 25000003 PHARM REV CODE 250: Performed by: STUDENT IN AN ORGANIZED HEALTH CARE EDUCATION/TRAINING PROGRAM

## 2020-01-10 PROCEDURE — 99253 IP/OBS CNSLTJ NEW/EST LOW 45: CPT | Mod: ,,, | Performed by: OBSTETRICS & GYNECOLOGY

## 2020-01-10 PROCEDURE — 99223 1ST HOSP IP/OBS HIGH 75: CPT | Mod: ,,, | Performed by: HOSPITALIST

## 2020-01-10 PROCEDURE — 36415 COLL VENOUS BLD VENIPUNCTURE: CPT

## 2020-01-10 PROCEDURE — 82550 ASSAY OF CK (CPK): CPT

## 2020-01-10 PROCEDURE — 85025 COMPLETE CBC W/AUTO DIFF WBC: CPT | Mod: 91

## 2020-01-10 PROCEDURE — 25000003 PHARM REV CODE 250: Performed by: HOSPITALIST

## 2020-01-10 PROCEDURE — 87040 BLOOD CULTURE FOR BACTERIA: CPT

## 2020-01-10 PROCEDURE — 87502 INFLUENZA DNA AMP PROBE: CPT

## 2020-01-10 PROCEDURE — 85610 PROTHROMBIN TIME: CPT

## 2020-01-10 PROCEDURE — 84100 ASSAY OF PHOSPHORUS: CPT

## 2020-01-10 PROCEDURE — 86901 BLOOD TYPING SEROLOGIC RH(D): CPT

## 2020-01-10 PROCEDURE — 99223 PR INITIAL HOSPITAL CARE,LEVL III: ICD-10-PCS | Mod: ,,, | Performed by: HOSPITALIST

## 2020-01-10 PROCEDURE — 63600175 PHARM REV CODE 636 W HCPCS: Performed by: HOSPITALIST

## 2020-01-10 PROCEDURE — 83735 ASSAY OF MAGNESIUM: CPT

## 2020-01-10 PROCEDURE — 25000242 PHARM REV CODE 250 ALT 637 W/ HCPCS: Performed by: STUDENT IN AN ORGANIZED HEALTH CARE EDUCATION/TRAINING PROGRAM

## 2020-01-10 PROCEDURE — 83615 LACTATE (LD) (LDH) ENZYME: CPT

## 2020-01-10 PROCEDURE — 85730 THROMBOPLASTIN TIME PARTIAL: CPT

## 2020-01-10 PROCEDURE — 83880 ASSAY OF NATRIURETIC PEPTIDE: CPT

## 2020-01-10 PROCEDURE — 80053 COMPREHEN METABOLIC PANEL: CPT

## 2020-01-10 PROCEDURE — 20600001 HC STEP DOWN PRIVATE ROOM

## 2020-01-10 RX ORDER — KETOROLAC TROMETHAMINE 30 MG/ML
30 INJECTION, SOLUTION INTRAMUSCULAR; INTRAVENOUS EVERY 6 HOURS
Status: DISCONTINUED | OUTPATIENT
Start: 2020-01-10 | End: 2020-01-11

## 2020-01-10 RX ORDER — AMLODIPINE BESYLATE 10 MG/1
10 TABLET ORAL DAILY
Status: DISCONTINUED | OUTPATIENT
Start: 2020-01-10 | End: 2020-01-19 | Stop reason: HOSPADM

## 2020-01-10 RX ORDER — FERROUS SULFATE 325(65) MG
325 TABLET, DELAYED RELEASE (ENTERIC COATED) ORAL DAILY
Status: DISCONTINUED | OUTPATIENT
Start: 2020-01-10 | End: 2020-01-10

## 2020-01-10 RX ORDER — ONDANSETRON 8 MG/1
8 TABLET, ORALLY DISINTEGRATING ORAL EVERY 6 HOURS PRN
Status: DISCONTINUED | OUTPATIENT
Start: 2020-01-10 | End: 2020-01-19 | Stop reason: HOSPADM

## 2020-01-10 RX ORDER — NALOXONE HCL 0.4 MG/ML
0.02 VIAL (ML) INJECTION
Status: DISCONTINUED | OUTPATIENT
Start: 2020-01-10 | End: 2020-01-19 | Stop reason: HOSPADM

## 2020-01-10 RX ORDER — HYDROMORPHONE HYDROCHLORIDE 1 MG/ML
1 INJECTION, SOLUTION INTRAMUSCULAR; INTRAVENOUS; SUBCUTANEOUS
Status: DISCONTINUED | OUTPATIENT
Start: 2020-01-10 | End: 2020-01-10

## 2020-01-10 RX ORDER — PROCHLORPERAZINE EDISYLATE 5 MG/ML
5 INJECTION INTRAMUSCULAR; INTRAVENOUS EVERY 6 HOURS PRN
Status: DISCONTINUED | OUTPATIENT
Start: 2020-01-10 | End: 2020-01-19 | Stop reason: HOSPADM

## 2020-01-10 RX ORDER — HYDROMORPHONE HYDROCHLORIDE 1 MG/ML
0.5 INJECTION, SOLUTION INTRAMUSCULAR; INTRAVENOUS; SUBCUTANEOUS ONCE
Status: DISCONTINUED | OUTPATIENT
Start: 2020-01-10 | End: 2020-01-10

## 2020-01-10 RX ORDER — HYDROMORPHONE HYDROCHLORIDE 1 MG/ML
1.5 INJECTION, SOLUTION INTRAMUSCULAR; INTRAVENOUS; SUBCUTANEOUS EVERY 4 HOURS PRN
Status: DISCONTINUED | OUTPATIENT
Start: 2020-01-10 | End: 2020-01-10

## 2020-01-10 RX ORDER — HYDROMORPHONE HYDROCHLORIDE 1 MG/ML
2 INJECTION, SOLUTION INTRAMUSCULAR; INTRAVENOUS; SUBCUTANEOUS
Status: DISCONTINUED | OUTPATIENT
Start: 2020-01-10 | End: 2020-01-12

## 2020-01-10 RX ORDER — DEXTROSE MONOHYDRATE AND SODIUM CHLORIDE 5; .45 G/100ML; G/100ML
INJECTION, SOLUTION INTRAVENOUS CONTINUOUS
Status: DISCONTINUED | OUTPATIENT
Start: 2020-01-10 | End: 2020-01-10

## 2020-01-10 RX ORDER — HYDROMORPHONE HYDROCHLORIDE 1 MG/ML
1 INJECTION, SOLUTION INTRAMUSCULAR; INTRAVENOUS; SUBCUTANEOUS EVERY 4 HOURS PRN
Status: DISCONTINUED | OUTPATIENT
Start: 2020-01-10 | End: 2020-01-10

## 2020-01-10 RX ORDER — POLYETHYLENE GLYCOL 3350 17 G/17G
17 POWDER, FOR SOLUTION ORAL DAILY
Status: DISCONTINUED | OUTPATIENT
Start: 2020-01-10 | End: 2020-01-19 | Stop reason: HOSPADM

## 2020-01-10 RX ORDER — FOLIC ACID 1 MG/1
1 TABLET ORAL DAILY
Status: DISCONTINUED | OUTPATIENT
Start: 2020-01-10 | End: 2020-01-19 | Stop reason: HOSPADM

## 2020-01-10 RX ORDER — MEDROXYPROGESTERONE ACETATE 10 MG/1
20 TABLET ORAL 3 TIMES DAILY
Status: DISCONTINUED | OUTPATIENT
Start: 2020-01-10 | End: 2020-01-17

## 2020-01-10 RX ORDER — HYDROCHLOROTHIAZIDE 25 MG/1
25 TABLET ORAL DAILY
Status: DISCONTINUED | OUTPATIENT
Start: 2020-01-10 | End: 2020-01-19 | Stop reason: HOSPADM

## 2020-01-10 RX ORDER — IBUPROFEN 400 MG/1
400 TABLET ORAL EVERY 6 HOURS PRN
Status: DISCONTINUED | OUTPATIENT
Start: 2020-01-10 | End: 2020-01-10

## 2020-01-10 RX ORDER — PROCHLORPERAZINE MALEATE 10 MG
10 TABLET ORAL EVERY 6 HOURS PRN
Status: DISCONTINUED | OUTPATIENT
Start: 2020-01-10 | End: 2020-01-10

## 2020-01-10 RX ORDER — FLUTICASONE FUROATE AND VILANTEROL 100; 25 UG/1; UG/1
1 POWDER RESPIRATORY (INHALATION) DAILY
Status: DISCONTINUED | OUTPATIENT
Start: 2020-01-10 | End: 2020-01-19 | Stop reason: HOSPADM

## 2020-01-10 RX ORDER — HYDRALAZINE HYDROCHLORIDE 25 MG/1
25 TABLET, FILM COATED ORAL EVERY 8 HOURS PRN
Status: DISCONTINUED | OUTPATIENT
Start: 2020-01-10 | End: 2020-01-16

## 2020-01-10 RX ORDER — POTASSIUM CHLORIDE 20 MEQ/1
40 TABLET, EXTENDED RELEASE ORAL ONCE
Status: COMPLETED | OUTPATIENT
Start: 2020-01-10 | End: 2020-01-10

## 2020-01-10 RX ORDER — CARVEDILOL 25 MG/1
25 TABLET ORAL 2 TIMES DAILY
Status: DISCONTINUED | OUTPATIENT
Start: 2020-01-10 | End: 2020-01-19 | Stop reason: HOSPADM

## 2020-01-10 RX ORDER — FLUOXETINE HYDROCHLORIDE 20 MG/1
40 CAPSULE ORAL DAILY
Status: DISCONTINUED | OUTPATIENT
Start: 2020-01-10 | End: 2020-01-19 | Stop reason: HOSPADM

## 2020-01-10 RX ORDER — ENOXAPARIN SODIUM 100 MG/ML
40 INJECTION SUBCUTANEOUS EVERY 24 HOURS
Status: DISCONTINUED | OUTPATIENT
Start: 2020-01-10 | End: 2020-01-14

## 2020-01-10 RX ORDER — OXYCODONE AND ACETAMINOPHEN 10; 325 MG/1; MG/1
1 TABLET ORAL EVERY 4 HOURS PRN
Status: DISCONTINUED | OUTPATIENT
Start: 2020-01-10 | End: 2020-01-10

## 2020-01-10 RX ORDER — CARBAMAZEPINE 200 MG/1
800 TABLET ORAL 2 TIMES DAILY
Status: DISCONTINUED | OUTPATIENT
Start: 2020-01-10 | End: 2020-01-11

## 2020-01-10 RX ORDER — HYDROMORPHONE HYDROCHLORIDE 1 MG/ML
1 INJECTION, SOLUTION INTRAMUSCULAR; INTRAVENOUS; SUBCUTANEOUS
Status: DISCONTINUED | OUTPATIENT
Start: 2020-01-10 | End: 2020-01-12

## 2020-01-10 RX ORDER — IPRATROPIUM BROMIDE AND ALBUTEROL SULFATE 2.5; .5 MG/3ML; MG/3ML
3 SOLUTION RESPIRATORY (INHALATION) EVERY 4 HOURS PRN
Status: DISCONTINUED | OUTPATIENT
Start: 2020-01-10 | End: 2020-01-19 | Stop reason: HOSPADM

## 2020-01-10 RX ORDER — GABAPENTIN 400 MG/1
800 CAPSULE ORAL 2 TIMES DAILY
Status: DISCONTINUED | OUTPATIENT
Start: 2020-01-10 | End: 2020-01-14

## 2020-01-10 RX ORDER — AMOXICILLIN 250 MG
1 CAPSULE ORAL 2 TIMES DAILY
Status: DISCONTINUED | OUTPATIENT
Start: 2020-01-10 | End: 2020-01-14

## 2020-01-10 RX ORDER — HYDROMORPHONE HYDROCHLORIDE 1 MG/ML
0.5 INJECTION, SOLUTION INTRAMUSCULAR; INTRAVENOUS; SUBCUTANEOUS ONCE
Status: COMPLETED | OUTPATIENT
Start: 2020-01-10 | End: 2020-01-10

## 2020-01-10 RX ORDER — SODIUM CHLORIDE 450 MG/100ML
INJECTION, SOLUTION INTRAVENOUS CONTINUOUS
Status: DISCONTINUED | OUTPATIENT
Start: 2020-01-10 | End: 2020-01-12

## 2020-01-10 RX ORDER — SODIUM CHLORIDE 0.9 % (FLUSH) 0.9 %
10 SYRINGE (ML) INJECTION
Status: DISCONTINUED | OUTPATIENT
Start: 2020-01-10 | End: 2020-01-19 | Stop reason: HOSPADM

## 2020-01-10 RX ORDER — OXYCODONE HYDROCHLORIDE 5 MG/1
15 TABLET ORAL EVERY 4 HOURS PRN
Status: DISCONTINUED | OUTPATIENT
Start: 2020-01-10 | End: 2020-01-10

## 2020-01-10 RX ORDER — CARVEDILOL 12.5 MG/1
12.5 TABLET ORAL ONCE
Status: COMPLETED | OUTPATIENT
Start: 2020-01-10 | End: 2020-01-10

## 2020-01-10 RX ORDER — HYDROCHLOROTHIAZIDE 25 MG/1
25 TABLET ORAL DAILY
Status: ON HOLD | COMMUNITY
End: 2020-01-19 | Stop reason: HOSPADM

## 2020-01-10 RX ORDER — ALBUTEROL SULFATE 90 UG/1
2 AEROSOL, METERED RESPIRATORY (INHALATION) EVERY 6 HOURS PRN
Status: DISCONTINUED | OUTPATIENT
Start: 2020-01-10 | End: 2020-01-10

## 2020-01-10 RX ORDER — ACETAMINOPHEN 325 MG/1
650 TABLET ORAL
Status: DISCONTINUED | OUTPATIENT
Start: 2020-01-10 | End: 2020-01-14

## 2020-01-10 RX ADMIN — ENOXAPARIN SODIUM 40 MG: 100 INJECTION SUBCUTANEOUS at 05:01

## 2020-01-10 RX ADMIN — FLUOXETINE 40 MG: 20 CAPSULE ORAL at 08:01

## 2020-01-10 RX ADMIN — AMLODIPINE BESYLATE 10 MG: 10 TABLET ORAL at 08:01

## 2020-01-10 RX ADMIN — ACETAMINOPHEN 650 MG: 325 TABLET ORAL at 04:01

## 2020-01-10 RX ADMIN — FOLIC ACID 1 MG: 1 TABLET ORAL at 08:01

## 2020-01-10 RX ADMIN — KETOROLAC TROMETHAMINE 30 MG: 30 INJECTION, SOLUTION INTRAMUSCULAR at 03:01

## 2020-01-10 RX ADMIN — HYDROMORPHONE HYDROCHLORIDE 1.5 MG: 1 INJECTION, SOLUTION INTRAMUSCULAR; INTRAVENOUS; SUBCUTANEOUS at 01:01

## 2020-01-10 RX ADMIN — HYDROCHLOROTHIAZIDE 25 MG: 12.5 TABLET ORAL at 08:01

## 2020-01-10 RX ADMIN — CARBAMAZEPINE 800 MG: 200 TABLET ORAL at 10:01

## 2020-01-10 RX ADMIN — GABAPENTIN 800 MG: 400 CAPSULE ORAL at 09:01

## 2020-01-10 RX ADMIN — CARVEDILOL 25 MG: 25 TABLET, FILM COATED ORAL at 09:01

## 2020-01-10 RX ADMIN — ACETAMINOPHEN 650 MG: 325 TABLET ORAL at 08:01

## 2020-01-10 RX ADMIN — CARVEDILOL 25 MG: 25 TABLET, FILM COATED ORAL at 08:01

## 2020-01-10 RX ADMIN — HYDROMORPHONE HYDROCHLORIDE 1 MG: 1 INJECTION, SOLUTION INTRAMUSCULAR; INTRAVENOUS; SUBCUTANEOUS at 04:01

## 2020-01-10 RX ADMIN — POTASSIUM CHLORIDE 40 MEQ: 1500 TABLET, EXTENDED RELEASE ORAL at 01:01

## 2020-01-10 RX ADMIN — ACETAMINOPHEN 650 MG: 325 TABLET ORAL at 09:01

## 2020-01-10 RX ADMIN — SENNOSIDES AND DOCUSATE SODIUM 1 TABLET: 8.6; 5 TABLET ORAL at 08:01

## 2020-01-10 RX ADMIN — HYDROMORPHONE HYDROCHLORIDE 1.5 MG: 1 INJECTION, SOLUTION INTRAMUSCULAR; INTRAVENOUS; SUBCUTANEOUS at 08:01

## 2020-01-10 RX ADMIN — GABAPENTIN 800 MG: 400 CAPSULE ORAL at 08:01

## 2020-01-10 RX ADMIN — POTASSIUM CHLORIDE 40 MEQ: 1500 TABLET, EXTENDED RELEASE ORAL at 11:01

## 2020-01-10 RX ADMIN — SODIUM CHLORIDE: 0.45 INJECTION, SOLUTION INTRAVENOUS at 02:01

## 2020-01-10 RX ADMIN — HYDROMORPHONE HYDROCHLORIDE 0.5 MG: 1 INJECTION, SOLUTION INTRAMUSCULAR; INTRAVENOUS; SUBCUTANEOUS at 03:01

## 2020-01-10 RX ADMIN — CARBAMAZEPINE 800 MG: 200 TABLET ORAL at 08:01

## 2020-01-10 RX ADMIN — SENNOSIDES AND DOCUSATE SODIUM 1 TABLET: 8.6; 5 TABLET ORAL at 09:01

## 2020-01-10 RX ADMIN — FLUTICASONE FUROATE AND VILANTEROL TRIFENATATE 1 PUFF: 100; 25 POWDER RESPIRATORY (INHALATION) at 08:01

## 2020-01-10 RX ADMIN — HYDROMORPHONE HYDROCHLORIDE 2 MG: 1 INJECTION, SOLUTION INTRAMUSCULAR; INTRAVENOUS; SUBCUTANEOUS at 06:01

## 2020-01-10 RX ADMIN — ONDANSETRON 8 MG: 8 TABLET, ORALLY DISINTEGRATING ORAL at 01:01

## 2020-01-10 RX ADMIN — SODIUM CHLORIDE: 0.45 INJECTION, SOLUTION INTRAVENOUS at 12:01

## 2020-01-10 RX ADMIN — PROCHLORPERAZINE EDISYLATE 5 MG: 5 INJECTION INTRAMUSCULAR; INTRAVENOUS at 05:01

## 2020-01-10 RX ADMIN — HYDROMORPHONE HYDROCHLORIDE 1 MG: 1 INJECTION, SOLUTION INTRAMUSCULAR; INTRAVENOUS; SUBCUTANEOUS at 12:01

## 2020-01-10 RX ADMIN — CARVEDILOL 12.5 MG: 12.5 TABLET, FILM COATED ORAL at 01:01

## 2020-01-10 RX ADMIN — POTASSIUM CHLORIDE 40 MEQ: 1500 TABLET, EXTENDED RELEASE ORAL at 12:01

## 2020-01-10 RX ADMIN — SODIUM CHLORIDE: 0.45 INJECTION, SOLUTION INTRAVENOUS at 09:01

## 2020-01-10 NOTE — ASSESSMENT & PLAN NOTE
40 yo female with sickle cell beta thalassemia presents with bilateral leg pain R>L for 2 days consistent with her typical pain crises. Her pain was unrelieved by her home percocet. She denies any symptoms of infection although she says she was sick 1-2 weeks ago with flu like symptoms but has recovered. She is having some shortness of breath also but denies chest pain or cough. On arrival she is stable and satting well on room air. Interestingly hemoglobin appears to be at baseline and retic count normal but patient does have significant iron and folate deficiency which could cause reticulocyte count to be low. I have low suspicion for acute chest at this time and she does not appear to have infection.    Plan:  -IVF with 1/2NS at 125cc/hr. Caution given lower extremity edema.  -Pain control: home Percocet 10-325mg q4h for moderate pain and IV dilaudid 1 mg q4 for severe pain  -Daily reticulocyte count and LDH  -Folic acid supplementation. Consider Venopher.  -Infectious workup: UA without evidence of infection, CXR without focal consolidation. Follow up blood cultures, respiratory infection panel, influenza test  -Will order US bilateral lower extremity to rule out DVT

## 2020-01-10 NOTE — ASSESSMENT & PLAN NOTE
Patient has a history of menorrhagia and most recently had a ferritin of 2. She is s/p 1 dose of feraheme on Tuesday and is supposed to receive another dose next Tuesday. She has tried oral iron supplementation in the past but remembers not being able to tolerate    Plan:  -will repeat ferritin today  -Trend CBC daily  -Consider Venofer while inpatient  -Ensure patient follows up with OBGYN

## 2020-01-10 NOTE — PROGRESS NOTES
Notified MD. Waldemar Of the patient stating that she has L ear pain and that her chest feels like someone is punching her. VSS. No complaints of SOB. Chest xray will be ordered. Will continue to monitor.

## 2020-01-10 NOTE — PLAN OF CARE
Patient AAOx4. Anxious about current situation. Family is at the bedside, attentive to patient. Patient complained of chest and abdominal pain. CXR done. Patient also stated SOB, sats >90% on room air. PRN dilaudid and toradol administered with moderate relief.  Patient stated nausea. PRN zofran administered with moderate relief. Patient is bleeding vaginally. Patient has went through 10 maternity pads this shift. Plan of care discussed with patient. Patient is free of fall/trauma/injury. . All questions addressed. Will continue to monitor

## 2020-01-10 NOTE — ASSESSMENT & PLAN NOTE
Levels were 2.9 about 2 weeks ago. Patient not on supplementation at home    Plan:  -Will start folic acid 1 mg daily

## 2020-01-10 NOTE — ASSESSMENT & PLAN NOTE
Home medication: amlodipine 10 mg qd, Coreg 25 mg BID(patient has actually been taking incorrectly 12.5 BID), and HCTZ 25 mg daily  -Patient significantly hypertensive up to 190    Plan:  -Additional 12.5 mg Coreg given on admission  -Continue amlodipine 10, HCTZ 25, and Coreg 25  -Ensure adequate pain control before giving PRN hydralazine  -Consider adding ACE/ARB for further optimization if not controlled on above regimen

## 2020-01-10 NOTE — HOSPITAL COURSE
Ms. Nazanin Malone was admitted to [facility] on 01/10/20 for sickle cell crisis. She was started on IVF and pain regimen including home PERCOCET and IV DILAUDID for severe pain. Patient also reported menorrhagia much more so than her normal amount (she has heavy menstrual periods usually but this is much worse than normal.) OBGYN was consulted on 01/10 and she was started on Medroxyprogesterone for 7 days to help decrease her bleeding in this current setting; recommended patient follow up OB/GYN outpatient to discuss further options for AUB control including IUD vs hysterectomy. TTE was preformed on 01/10 as patient reported of dyspnea, however it showed EF of 60% with some indeterminate amount of diastolic dysfunction. Patient's H/H continued to drop with increase in her retic count, and suspected anemia was from menstrual period which improved with starting progesterone, folate, and B12 supplementation. Her pain was relative well-controlled but intolerance of decreasing frequency of IV DILAUDID and scheduled PO pain medications, and adding adjunctive pain control with scheduled Tylenol and Gabapentin. On 01/14,  patient continued to struggle with pain with current regimen, requiring frequent IV breakthrough pain control and she was changed to DILAUDID PCA pump at 0.2mg/bolus every 6 min for a total of 2mg/hr. On 01/15 patient went through 12 mg of IV dilaudid over 16 hours without relief of her lower extremity pain.  Because of difficulty controling patient's pain with conservative measures of pain control and IVF, hematology was consulted on 01/15. Peripheral smear and Hgb electrophoresis were unremarkable for significant sickling of RBC's more than baseline, and exchange transfusion was not recommended. She was re-initiated on Hydroxyurea on 01/16, in addition to XANAX secondary to patient reported over-whelming anxiety related to disease course and hospitalization. Considered ordering MRI of bilateral hips to  rule out underlying ATN contributing to pain, however patient declined procedure.  Her hemoglobin has remained stable around ~9 (althought baseline closer to 10-11).  Patient's states that her pain control is much improved on solely PO pain medications; however, she noted some dyspnea while lying bed this morning.  Will continue to diuresis patient with lasix as the fluid she received throughout admission may be causing the dyspnea.  Patient urinated well with single dose 20mg PO and states her dyspnea is improved.  Will discharge today on her home dose oxycodone and will be continuing hydroxyurea.  Patient to follow up with her PCP in a week to assess pain improvement and toleration of hydroxyurea.  Also placed ambulatory referral to OBGYN for patient to follow up with.

## 2020-01-10 NOTE — PLAN OF CARE
01/10/20 0948   Discharge Assessment   Assessment Type Discharge Planning Assessment   Confirmed/corrected address and phone number on facesheet? Yes   Assessment information obtained from? Caregiver  (Daughter Timoteo)   Communicated expected length of stay with patient/caregiver yes   Prior to hospitilization cognitive status: Alert/Oriented   Prior to hospitalization functional status: Independent   Current cognitive status: Alert/Oriented   Current Functional Status: Independent   Facility Arrived From: home   Lives With child(jayce), dependent;parent(s)   Able to Return to Prior Arrangements yes   Is patient able to care for self after discharge? Yes   Who are your caregiver(s) and their phone number(s)? Phyllis Benson [phone]   Patient's perception of discharge disposition home or selfcare   Readmission Within the Last 30 Days no previous admission in last 30 days   Patient currently being followed by outpatient case management? No   Patient currently receives any other outside agency services? No   Equipment Currently Used at Home none   Is the patient taking medications as prescribed? yes   Does the patient have transportation home? Yes   Transportation Anticipated family or friend will provide   Discharge Plan A Home;Home with family   Discharge Plan B Home   DME Needed Upon Discharge  none   Patient/Family in Agreement with Plan yes     CM to bedside for d/c planning. Pt JAGRUTI but daughter Timoteo in room. Information obtained from Timoteo. She is a reliable historian. Family can bring patient home at d/c.  Updated on plan of care and white board updated.    Timoteo stated she was not sure who patient's PCP was but that she usually sees Dr. Montgomery at [facility] (hem/onc).     CM will continue to follow and assist with any d/c planning needs.    Payor: BLUE CROSS BLUE SHIELD / Plan: [place] PPO / Product Type: PPO /     Extended Emergency Contact Information  Primary Emergency Contact: Phyllis Benson    [place]  Mobile Phone: [phone]  Relation: Mother      [id number] - [place] - [address]  [address]  [place] [address]  Phone: [phone] Fax: [phone]      Julie Haase RN  Case Management [phone]

## 2020-01-10 NOTE — ED PROVIDER NOTES
Encounter Date: 1/9/2020       History     Chief Complaint   Patient presents with    Sickle Cell Pain Crisis     Joint pain.      Patient is a 39-year-old female with history of sickle cell anemia, hypertension, depression, asthma is presenting to the ER for evaluation of multiple complaints. Patient reports having joint pain, fatigue, abdominal cramps, dizziness.  Patient states that she thinks that she is having a crisis.  Patient reports she has bilateral lower extremity pain as well as hip pain which is chronic in nature for her but worsened today.  Patient reports that she was told that her ferritin levels were low and she recently had infusion 2 days ago.  Patient is scheduled for another ferritin infusion on Tuesday.  Patient states that this feels similar to when she has had her sickle cell crisis in the past.  She denies any fevers or chills at home.  Patient reports that she had a recent cold that she has gotten better from.  She denies any vomiting or diarrhea.  No chest pain or palpitations otherwise.  She denies recent antibiotic use.  Patient reports that she is currently on her menstrual period, which is usually heavy for her.  She is concerned that her hemoglobin level is low today.  Patient is followed by heme onc at [facility].  Patient reports she takes Percocet for pain daily.    The history is provided by the patient.     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 4/29/2017    Asthma     Depression     Hypertension     Morbid obesity     Opioid dependence 4/16/2017    Pneumonia due to Streptococcus pneumoniae 4/25/2017    Right lower lobe pneumonia 4/29/2017    Sepsis due to Streptococcus pneumoniae 4/25/2017    Sickle cell-beta thalassemia disease with pain     Trigeminal neuralgia      Past Surgical History:   Procedure  Laterality Date     SECTION      TONSILLECTOMY      TUBAL LIGATION       Family History   Problem Relation Age of Onset    Heart disease Mother     Diabetes Mother     Heart disease Father     Breast cancer Neg Hx     Ovarian cancer Neg Hx     Colon cancer Neg Hx      Social History     Tobacco Use    Smoking status: Never Smoker    Smokeless tobacco: Never Used   Substance Use Topics    Alcohol use: No    Drug use: No     Review of Systems   Constitutional: Negative for chills and fever.   HENT: Negative for congestion.    Respiratory: Positive for shortness of breath. Negative for cough.    Cardiovascular: Negative for chest pain and palpitations.   Gastrointestinal: Negative for abdominal pain, diarrhea, nausea and vomiting.   Genitourinary: Positive for vaginal bleeding. Negative for dysuria, flank pain, hematuria and vaginal discharge.   Musculoskeletal: Positive for arthralgias and myalgias. Negative for joint swelling.   Skin: Negative for rash.   Allergic/Immunologic: Negative for immunocompromised state.   Neurological: Negative for weakness.   Hematological: Does not bruise/bleed easily.   Psychiatric/Behavioral: Negative for confusion.       Physical Exam     Initial Vitals [20 1855]   BP Pulse Resp Temp SpO2   (!) 182/90 89 20 98.8 °F (37.1 °C) 97 %      MAP       --         Physical Exam    Vitals reviewed.  Constitutional: She appears well-developed and well-nourished. She is not diaphoretic. No distress.   HENT:   Head: Normocephalic and atraumatic.   Mouth/Throat: Oropharynx is clear and moist.   Eyes: Conjunctivae and EOM are normal.   Neck: Neck supple.   Cardiovascular: Normal rate, regular rhythm, normal heart sounds and intact distal pulses.   Pulmonary/Chest: Breath sounds normal. No respiratory distress. She has no wheezes.   Abdominal: Soft. She exhibits no distension. There is no tenderness.   Neurological: She is alert and oriented to person, place, and time.  "  Skin: Skin is warm and dry.         ED Course   Procedures  Labs Reviewed   CBC W/ AUTO DIFFERENTIAL - Abnormal; Notable for the following components:       Result Value    Hemoglobin 9.9 (*)     Hematocrit 32.8 (*)     Mean Corpuscular Volume 63 (*)     Mean Corpuscular Hemoglobin 19.0 (*)     Mean Corpuscular Hemoglobin Conc 30.2 (*)     RDW 20.7 (*)     nRBC 1 (*)     All other components within normal limits   COMPREHENSIVE METABOLIC PANEL - Abnormal; Notable for the following components:    Potassium 3.2 (*)     Glucose 132 (*)     All other components within normal limits   URINALYSIS, REFLEX TO URINE CULTURE - Abnormal; Notable for the following components:    Color, UA Red (*)     Appearance, UA Hazy (*)     Protein, UA 1+ (*)     Occult Blood UA 3+ (*)     Leukocytes, UA Trace (*)     All other components within normal limits    Narrative:     Preferred Collection Type->Urine, Clean Catch   URINALYSIS MICROSCOPIC - Abnormal; Notable for the following components:    RBC, UA >100 (*)     All other components within normal limits    Narrative:     Preferred Collection Type->Urine, Clean Catch   RETICULOCYTES   POCT URINE PREGNANCY          Imaging Results          X-Ray Chest PA And Lateral (Final result)  Result time 01/09/20 21:39:18    Final result by Romel Coffey MD (01/09/20 21:39:18)                 Impression:      Borderline cardiomegaly and coarsened interstitial lung markings.  No confluent area of consolidation or detrimental change when compared with 07/31/2019.      Electronically signed by: Romel Coffey MD  Date:    01/09/2020  Time:    21:39             Narrative:    EXAMINATION:  XR CHEST PA AND LATERAL    CLINICAL HISTORY:  Provided history is "  Sickle-cell disease without crisis".    TECHNIQUE:  Frontal and lateral views of the chest were performed.    COMPARISON:  07/31/2019.    FINDINGS:  Lateral view is limited by motion blurring.  Cardiomediastinal silhouette is at the upper " limits of normal in size but stable.  There are diffusely coarsened interstitial lung markings, similar to the prior study.  No confluent area of consolidation.  No sizable pleural effusion.  No pneumothorax.                                       APC / Resident Notes:   Patient was seen in the ER promptly upon arrival.  She is afebrile, no acute distress.  Physical examination fairly unremarkable. Heart and lung sounds are normal.  Abdomen is soft, nondistended, nontender. Lower extremity appears to be unremarkable.  There is no erythema, swelling. IV access was established, labs ordered, fluids given.  Patient was given Dilaudid, oral Benadryl.    Laboratory studies show normal white count 6.4.  Hemoglobin is stable 9.9.  Platelets 285.  Chemistries reveal potassium of 3.2, she was given supplemental potassium in the ED.  Liver functions normal. Reticulocyte 2.0 similar to previous findings.    Xray of chest reveals borderline cardiomegaly and coarsened interstitial lung marking.  No evidence of consolidation.  No changes when compared to previous x-rays.    Upon reassessment patient states she continues to have some discomfort despite to doses of Dilaudid and 2 L IV fluids.  Discussed case with Hospital Medicine for primary admission of patient.  Patient was informed of the decision for admission, acknowledges and agrees to treatment plan.  She has been stable during her stay in the ED and is stable for transfer to the floor at this time.              ED Course as of Jan 09 2335   Thu Jan 09, 2020 2149 Urinalysis, Reflex to Urine Culture Urine, Clean Catch(!) [AJ]   2324 Comprehensive metabolic panel(!) [AJ]      ED Course User Index  [AJ] Jacquelyn Moran PA-C                Clinical Impression:       ICD-10-CM ICD-9-CM   1. Sickle cell pain crisis D57.00 282.62   2. Sickle cell anemia D57.1 282.60         Disposition:   Disposition: Admitted  Condition: Stable                     Jacquelyn Moran  GEOVANNY  01/09/20 3393

## 2020-01-10 NOTE — CONSULTS
[facility]  Obstetrics & Gynecology  Consult Note    Patient Name: Nazanin Malone  MRN: [id number]  Admission Date: 1/9/2020  Hospital Length of Stay: 1 days  Code Status: Full Code  Primary Care Provider: Primary Doctor No  Principal Problem: Sickle cell disease, type S beta-zero thalassemia with crisis    Inpatient consult to Gynecology  Consult performed by: Waldemar Kapoor MD  Consult ordered by: Darrel Stone MD  Reason for consult: AUB        Subjective:     Chief Complaint: AUB    History of Present Illness: 38 yo female who is admitted to hospital for sickle cell pain crisis. GYN consulted for AUB. Pt reports she frequently has pain crises with menses. Typical periods are 7-8 days long, regular, every 21-25 days. Typical periods are heavy, but this is the heaviest period she has ever had. Currently on cycle day 3. Reports she has saturated 8 pads since midnight. Pt typically has bad periods - reports frequent swelling and mood issues in addition to heavy bleeding and pain crises.    No current facility-administered medications on file prior to encounter.      Current Outpatient Medications on File Prior to Encounter   Medication Sig    amLODIPine (NORVASC) 10 MG tablet TAKE 1 TABLET BY MOUTH EVERY DAY    carBAMazepine (TEGRETOL) 200 mg tablet Take 4 tablets (800 mg total) by mouth 2 (two) times daily. (Patient taking differently: Take 400 mg by mouth 2 (two) times daily. )    carvedilol (COREG) 25 MG tablet Take 1 tablet (25 mg total) by mouth 2 (two) times daily.    FLUoxetine 40 MG capsule TAKE 1 CAPSULE BY MOUTH EVERY DAY    ondansetron (ZOFRAN-ODT) 8 MG TbDL Dissolve 1 tablet (8 mg total) by mouth every 6 (six) hours as needed.    oxyCODONE-acetaminophen (PERCOCET)  mg per tablet Take 1 tablet by mouth every 4 (four) hours as needed for Pain.    prochlorperazine (COMPAZINE) 10 MG tablet Take 1 tablet (10 mg total) by mouth every 6 (six) hours as needed.    albuterol  (VENTOLIN HFA) 90 mcg/actuation inhaler Inhale 2 puffs into the lungs every 6 (six) hours as needed for Wheezing or Shortness of Breath. Rescue    albuterol-ipratropium (DUO-NEB) 2.5 mg-0.5 mg/3 mL nebulizer solution Take 3 mLs by nebulization every 6 (six) hours as needed for Wheezing or Shortness of Breath. Rescue    ascorbic acid, vitamin C, (VITAMIN C) 500 MG tablet Take 500 mg by mouth once daily.    budesonide-formoterol 80-4.5 mcg (SYMBICORT) 80-4.5 mcg/actuation HFAA Inhale 2 puffs into the lungs 2 (two) times daily. Controller    calcium carbonate (TUMS) 200 mg calcium (500 mg) chewable tablet Take 1 tablet by mouth daily as needed for Heartburn (stomach).    carvedilol (COREG) 12.5 MG tablet Take 2 tablets (25 mg total) by mouth 2 (two) times daily with meals.    cyclobenzaprine (FLEXERIL) 10 MG tablet Take 1 tablet (10 mg total) by mouth 3 (three) times daily as needed for Muscle spasms.    ergocalciferol (VITAMIN D2) 50,000 unit Cap Take 1 capsule (50,000 Units total) by mouth every 7 days. (Wednesdays)    fluticasone (FLONASE) 50 mcg/actuation nasal spray 2 sprays (100 mcg total) by Each Nare route once daily.    gabapentin (NEURONTIN) 800 MG tablet Take 1 tablet (800 mg total) by mouth 2 (two) times daily.    hydroCHLOROthiazide (HYDRODIURIL) 25 MG tablet Take 1 tablet (25 mg total) by mouth once daily.    hydroCHLOROthiazide (HYDRODIURIL) 25 MG tablet Take 25 mg by mouth once daily.    senna-docusate 8.6-50 mg (PERICOLACE) 8.6-50 mg per tablet Take 1 tablet by mouth 2 (two) times daily as needed for Constipation.    [DISCONTINUED] acetaminophen (TYLENOL EXTRA STRENGTH) 500 MG tablet Take 1,000 mg by mouth daily as needed for Pain.       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 4/29/2017    Asthma     Depression      Hypertension     Morbid obesity     Opioid dependence 2017    Pneumonia due to Streptococcus pneumoniae 2017    Right lower lobe pneumonia 2017    Sepsis due to Streptococcus pneumoniae 2017    Sickle cell-beta thalassemia disease with pain     Trigeminal neuralgia      OB History    Para Term  AB Living   2 2 1 1 0 2   SAB TAB Ectopic Multiple Live Births   0 0 0 0 2      # Outcome Date GA Lbr Nikhil/2nd Weight Sex Delivery Anes PTL Lv   2   36w0d    Vag-Spont  N PENNY   1 Term      CS-LTranv  N PENNY     Past Surgical History:   Procedure Laterality Date     SECTION      TONSILLECTOMY      TUBAL LIGATION       Family History     Problem Relation (Age of Onset)    Diabetes Mother    Heart disease Mother, Father        Tobacco Use    Smoking status: Never Smoker    Smokeless tobacco: Never Used   Substance and Sexual Activity    Alcohol use: No    Drug use: No    Sexual activity: Not Currently     Birth control/protection: See Surgical Hx     Review of Systems   Constitutional: Negative for chills and fever.   Eyes: Negative for visual disturbance.   Respiratory: Negative for shortness of breath.    Cardiovascular: Negative for chest pain.   Gastrointestinal: Negative for abdominal pain, nausea and vomiting.   Genitourinary: Positive for vaginal bleeding. Negative for vaginal discharge.   Integumentary:  Negative for rash.   Neurological: Negative for headaches.     Objective:     Vital Signs (Most Recent):  Temp: 98.3 °F (36.8 °C) (01/10/20 1129)  Pulse: 90 (01/10/20 1522)  Resp: 18 (01/10/20 1129)  BP: 129/71 (01/10/20 1522)  SpO2: (!) 90 % (01/10/20 1129) Vital Signs (24h Range):  Temp:  [98 °F (36.7 °C)-99.1 °F (37.3 °C)] 98.3 °F (36.8 °C)  Pulse:  [78-90] 90  Resp:  [16-20] 18  SpO2:  [90 %-97 %] 90 %  BP: (122-191)/() 129/71     Weight: (!) 147 kg (324 lb)  Body mass index is 59.26 kg/m².  Patient's last menstrual period was 2020 (exact  date).    Physical Exam:   Constitutional: She appears well-developed and well-nourished. No distress.    HENT:   Head: Normocephalic and atraumatic.       Pulmonary/Chest: Effort normal. No respiratory distress.        Abdominal: Soft. She exhibits no distension. There is no tenderness.     Genitourinary:   Genitourinary Comments: Normal appearing external genitalia, speculum exam reveals normal appearing vaginal mucosa and cervix, minimal bleeding visualized on exam, pad approx 10% saturated - had been in place 30 min, bimanual exam reveals no masses, mild tenderness                  Skin: She is not diaphoretic.        Laboratory:  Recent Labs   Lab 01/09/20  1949 01/10/20  0302 01/10/20  1331   WBC 6.48 7.32 5.72   HGB 9.9* 9.0* 8.8*   HCT 32.8* 30.4* 30.3*   MCV 63* 64* 65*    257 260    UPT neg        Diagnostic Results:  TVUS: Enlarged heterogeneous uterus with no focal mass lesions and normal endometrial thickness.    Assessment/Plan:     Active Diagnoses:    Diagnosis Date Noted POA    PRINCIPAL PROBLEM:  Sickle cell disease, type S beta-zero thalassemia with crisis [D57.419] 04/26/2019 Yes    Shortness of breath [R06.02] 01/10/2020 Yes    Folate deficiency [E53.8] 01/10/2020 Yes    Hypokalemia [E87.6] 01/10/2020 Yes    Iron deficiency anemia due to chronic blood loss [D50.0] 12/27/2019 Yes    Trigeminal neuralgia [G50.0] 03/20/2018 Yes    Benign essential HTN [I10] 04/16/2017 Yes      Problems Resolved During this Admission:     --pt would likely benefit from high dose progesterone to stop acute bleeding. Rec 20 mg provera TID x 7 days. Orders placed.  --discussed more long term options including IUD, ablation, hysterectomy.   --pt has seen Dr. Santos in the past and is interested in following up. I will message Dr. Santos's clinic and have them call patient to schedule appointment.  --transfusion PRN per primary teams recommendations    Thank you for your consult. We will follow along via  chart. Please contact us with any questions or concerns.    Pt discussed with Dr. Rodriguez, on call OBGYN.    Waldemar Kapoor MD  Obstetrics & Gynecology  [facility]

## 2020-01-10 NOTE — H&P
[facility] Hospital Medicine  History & Physical    Patient Name: Nazanin Malone  MRN: [id number]  Admission Date: 1/9/2020  Attending Physician: Kameron Gardner, *   Primary Care Provider: [facility] Medicine Team: [facility] 4 Dayron Wall MD     Patient information was obtained from patient, past medical records and ER records.     Subjective:     Principal Problem:Sickle cell disease, type S beta-zero thalassemia with crisis    Chief Complaint:   Chief Complaint   Patient presents with    Sickle Cell Pain Crisis     Joint pain.         HPI: Ms. Malone is a 40 yo female with past medical history of sickle cell beta thalassemia, HTN, depression, and asthma who presents with chief complaint of pain in her legs.  She says that the pain started about 2 days ago and the pain is worse in her right leg than her left. The pain is associated with swelling and is located more in the bone rather than a particular joint. She says the pain is similar to previous sickle cell crises. She has tried her home percocet  mg q4 hours without significant relief. She usually uses this as a telling sign that she needs to come to the hospital. She is also complaining of some fatigue and shortness of breath with exertion. She denies chest pain, cough, congestion, fevers, chills, nausea, vomiting, diarrhea, constipation. She is currently on her menstrual period which is usually heavy. She says that she had a cold a week or two ago which she has recovered from. 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 She has not yet received her flu vaccination.    She follows with Dr. Montgomery for her sickle cell and was most recently saw on 12/27. She has been admitted 3 times in the last year for crisis(most recently in august 2019). Triggers for her usually include illness and changes in the weather. She says she has had acute chest in the past but cannot  remember the last time. She cannot remember the last time she has received a blood transfusion. It appears her baseline hemoglobin lies between 9-10. She has tried hydroxyurea before but did not tolerate/had a rash. Last time she saw Dr. Montgomery, she had low ferritin of 2 and was recommended to be given feraheme. She got one dose this past Tuesday and is scheduled to get another dose on Tuesday. It is suspected that low ferritin 2/2 heavy menstrual bleeding and it was recommended the patient follow up with OBGYN.    In the ED, the patient is afebrile, hypertensive in 180s and satting 97% on room air. Labs are notable for normal WBC, H/H 9.9/32.8, reticulocyte count 2.0, K 3 and rest of CMP within normal limits. Her UA had 1+ protein and >100 RBCs however patient on menstrual period. CXR obtained shows borderline cardiomegaly, coarsened interstitial lung markings, but no focal consolidation. She was given dilaudid 1 mg x 2, oral potassium, and 2L NS bolus.     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      Current Outpatient Medications on File Prior to Encounter   Medication Sig    albuterol (VENTOLIN HFA) 90 mcg/actuation inhaler Inhale 2 puffs into the lungs every 6 (six) hours as needed for Wheezing or Shortness of  Breath. Rescue    albuterol-ipratropium (DUO-NEB) 2.5 mg-0.5 mg/3 mL nebulizer solution Take 3 mLs by nebulization every 6 (six) hours as needed for Wheezing or Shortness of Breath. Rescue    amLODIPine (NORVASC) 10 MG tablet TAKE 1 TABLET BY MOUTH EVERY DAY    ascorbic acid, vitamin C, (VITAMIN C) 500 MG tablet Take 500 mg by mouth once daily.    budesonide-formoterol 80-4.5 mcg (SYMBICORT) 80-4.5 mcg/actuation HFAA Inhale 2 puffs into the lungs 2 (two) times daily. Controller    calcium carbonate (TUMS) 200 mg calcium (500 mg) chewable tablet Take 1 tablet by mouth daily as needed for Heartburn (stomach).    carBAMazepine (TEGRETOL) 200 mg tablet Take 4 tablets (800 mg total) by mouth 2 (two) times daily.    carvedilol (COREG) 12.5 MG tablet Take 2 tablets (25 mg total) by mouth 2 (two) times daily with meals.    carvedilol (COREG) 25 MG tablet Take 1 tablet (25 mg total) by mouth 2 (two) times daily.    cyclobenzaprine (FLEXERIL) 10 MG tablet Take 1 tablet (10 mg total) by mouth 3 (three) times daily as needed for Muscle spasms.    ergocalciferol (VITAMIN D2) 50,000 unit Cap Take 1 capsule (50,000 Units total) by mouth every 7 days. (Wednesdays)    FLUoxetine 40 MG capsule TAKE 1 CAPSULE BY MOUTH EVERY DAY    fluticasone (FLONASE) 50 mcg/actuation nasal spray 2 sprays (100 mcg total) by Each Nare route once daily.    gabapentin (NEURONTIN) 800 MG tablet Take 1 tablet (800 mg total) by mouth 2 (two) times daily.    hydroCHLOROthiazide (HYDRODIURIL) 25 MG tablet Take 1 tablet (25 mg total) by mouth once daily.    ondansetron (ZOFRAN-ODT) 8 MG TbDL Dissolve 1 tablet (8 mg total) by mouth every 6 (six) hours as needed.    oxyCODONE-acetaminophen (PERCOCET)  mg per tablet Take 1 tablet by mouth every 4 (four) hours as needed for Pain.    prochlorperazine (COMPAZINE) 10 MG tablet Take 1 tablet (10 mg total) by mouth every 6 (six) hours as needed.    senna-docusate 8.6-50 mg (PERICOLACE) 8.6-50  mg per tablet Take 1 tablet by mouth 2 (two) times daily as needed for Constipation.    [DISCONTINUED] acetaminophen (TYLENOL EXTRA STRENGTH) 500 MG tablet Take 1,000 mg by mouth daily as needed for Pain.     Family History     Problem Relation (Age of Onset)    Diabetes Mother    Heart disease Mother, Father        Tobacco Use    Smoking status: Never Smoker    Smokeless tobacco: Never Used   Substance and Sexual Activity    Alcohol use: No    Drug use: No    Sexual activity: Not Currently     Birth control/protection: See Surgical Hx     Review of Systems   Constitutional: Positive for fatigue. Negative for activity change, appetite change, chills and fever.   HENT: Negative for congestion and rhinorrhea.    Respiratory: Positive for shortness of breath. Negative for cough, chest tightness and wheezing.    Cardiovascular: Positive for leg swelling. Negative for chest pain and palpitations.   Gastrointestinal: Negative for abdominal distention, abdominal pain, constipation, diarrhea, nausea and vomiting.   Genitourinary: Negative for difficulty urinating, dysuria, flank pain, frequency, hematuria and urgency.   Musculoskeletal: Negative for arthralgias, back pain, joint swelling, neck pain and neck stiffness.   Skin: Negative for color change, pallor, rash and wound.   Neurological: Negative for dizziness, weakness, numbness and headaches.   Psychiatric/Behavioral: Negative for agitation and confusion.     Objective:     Vital Signs (Most Recent):  Temp: 98.8 °F (37.1 °C) (01/10/20 0032)  Pulse: 80 (01/10/20 0107)  Resp: 18 (01/10/20 0032)  BP: (!) 143/87 (01/10/20 0107)  SpO2: 97 % (01/10/20 0032) Vital Signs (24h Range):  Temp:  [98.8 °F (37.1 °C)-99.1 °F (37.3 °C)] 98.8 °F (37.1 °C)  Pulse:  [80-89] 80  Resp:  [16-20] 18  SpO2:  [96 %-97 %] 97 %  BP: (138-191)/() 143/87     Weight: (!) 147.2 kg (324 lb 8.3 oz)  Body mass index is 59.35 kg/m².    Physical Exam   Constitutional: She is oriented to  "person, place, and time. She appears well-developed and well-nourished. No distress.   Obese female in no apparent distress  She does not appear to be in pain   HENT:   Head: Normocephalic and atraumatic.   Eyes: Pupils are equal, round, and reactive to light. No scleral icterus.   Neck: Normal range of motion. Neck supple. No JVD present.   Cardiovascular: Normal rate, regular rhythm and normal heart sounds. Exam reveals no gallop and no friction rub.   No murmur heard.  Pulmonary/Chest: Effort normal and breath sounds normal. No respiratory distress. She has no wheezes. She has no rales.   Decreased breath sounds bilaterally  No wheezes or rales heard  No increased work of breathing on room air   Abdominal: Soft. Bowel sounds are normal. She exhibits no distension. There is no tenderness.   Musculoskeletal: She exhibits edema (1+ pitting lower extremity edema to mid-shin).   Neurological: She is alert and oriented to person, place, and time.   Skin: Skin is warm and dry.       Significant Labs:   CBC:   Recent Labs   Lab 01/09/20 1949   WBC 6.48   HGB 9.9*   HCT 32.8*        CMP:   Recent Labs   Lab 01/09/20 1949      K 3.2*      CO2 28   *   BUN 6   CREATININE 0.8   CALCIUM 9.4   PROT 8.1   ALBUMIN 3.7   BILITOT 0.2   ALKPHOS 104   AST 40   ALT 24   ANIONGAP 8   EGFRNONAA >60.0     Magnesium: No results for input(s): MG in the last 48 hours.  Urine Studies:   Recent Labs   Lab 01/09/20 1954   COLORU Red*   APPEARANCEUA Hazy*   PHUR 6.0   SPECGRAV 1.005   PROTEINUA 1+*   GLUCUA Negative   KETONESU Negative   BILIRUBINUA Negative   OCCULTUA 3+*   NITRITE Negative   LEUKOCYTESUR Trace*   RBCUA >100*   WBCUA 2   BACTERIA None   SQUAMEPITHEL 1   HYALINECASTS 0       Significant Imaging:   EXAMINATION:  XR CHEST PA AND LATERAL    CLINICAL HISTORY:  Provided history is "  Sickle-cell disease without crisis".    TECHNIQUE:  Frontal and lateral views of the chest were " performed.    COMPARISON:  07/31/2019.    FINDINGS:  Lateral view is limited by motion blurring.  Cardiomediastinal silhouette is at the upper limits of normal in size but stable.  There are diffusely coarsened interstitial lung markings, similar to the prior study.  No confluent area of consolidation.  No sizable pleural effusion.  No pneumothorax.      Impression       Borderline cardiomegaly and coarsened interstitial lung markings.  No confluent area of consolidation or detrimental change when compared with 07/31/2019.           Assessment/Plan:     * Sickle cell disease, type S beta-zero thalassemia with crisis  38 yo female with sickle cell beta thalassemia presents with bilateral leg pain R>L for 2 days consistent with her typical pain crises. Her pain was unrelieved by her home percocet. She denies any symptoms of infection although she says she was sick 1-2 weeks ago with flu like symptoms but has recovered. She is having some shortness of breath also but denies chest pain or cough. On arrival she is stable and satting well on room air. Interestingly hemoglobin appears to be at baseline and retic count normal but patient does have significant iron and folate deficiency which could cause reticulocyte count to be low. I have low suspicion for acute chest at this time and she does not appear to have infection.    Plan:  -IVF with 1/2NS at 125cc/hr. Caution given lower extremity edema.  -Pain control: home Percocet 10-325mg q4h for moderate pain and IV dilaudid 1 mg q4 for severe pain  -Daily reticulocyte count and LDH  -Folic acid supplementation. Consider Venopher.  -Infectious workup: UA without evidence of infection, CXR without focal consolidation. Follow up blood cultures, respiratory infection panel, influenza test  -Will order US bilateral lower extremity to rule out DVT      Hypokalemia  3.2 on admission s/p 40 mEq x2    Plan:  -Replete as needed      Folate deficiency  Levels were 2.9 about 2 weeks  ago. Patient not on supplementation at home    Plan:  -Will start folic acid 1 mg daily      Shortness of breath  Patient has a history of intermittent asthma for which she takes symbicort inhaler daily with albuterol or duonebs as needed. She has been experiencing 4 day history of shortness of breath with exertion, improved with rest, with associated leg swelling. No orthopnea or PND. Differential includes asthma exacerbation vs HF vs anemia vs deconditioning vs PE. She has used her inhaler without relief. Her las echo was in August and showed low normal EF 50-55% and concentric LVH.     Plan:  -Will order BNP and repeat TTE  -Continue controller inhaler while inpatient  -Duonebs as needed for shortness of breath      Iron deficiency anemia due to chronic blood loss  Patient has a history of menorrhagia and most recently had a ferritin of 2. She is s/p 1 dose of feraheme on Tuesday and is supposed to receive another dose next Tuesday. She has tried oral iron supplementation in the past but remembers not being able to tolerate    Plan:  -will repeat ferritin today  -Trend CBC daily  -Consider Venofer while inpatient  -Ensure patient follows up with OBGYN        Trigeminal neuralgia  Home medication: Carbamazepine 800 mg BID    Plan:  -Continue home Carbamazepine      Benign essential HTN  Home medication: amlodipine 10 mg qd, Coreg 25 mg BID(patient has actually been taking incorrectly 12.5 BID), and HCTZ 25 mg daily  -Patient significantly hypertensive up to 190    Plan:  -Additional 12.5 mg Coreg given on admission  -Continue amlodipine 10, HCTZ 25, and Coreg 25  -Ensure adequate pain control before giving PRN hydralazine  -Consider adding ACE/ARB for further optimization if not controlled on above regimen        VTE Risk Mitigation (From admission, onward)         Ordered     enoxaparin injection 40 mg  Daily      01/10/20 0015     IP VTE HIGH RISK PATIENT  Once      01/10/20 0015     Place JOSE LUIS weire  Until  discontinued      01/10/20 0015     IP VTE HIGH RISK PATIENT  Once      01/10/20 0015                   Dayron Wall MD  Department of Hospital Medicine   [facility]

## 2020-01-10 NOTE — ASSESSMENT & PLAN NOTE
Patient has a history of intermittent asthma for which she takes symbicort inhaler daily with albuterol or duonebs as needed. She has been experiencing 4 day history of shortness of breath with exertion, improved with rest, with associated leg swelling. No orthopnea or PND. Differential includes asthma exacerbation vs HF vs anemia vs deconditioning vs PE. She has used her inhaler without relief. Her las echo was in August and showed low normal EF 50-55% and concentric LVH.     Plan:  -Will order BNP and repeat TTE  -Continue controller inhaler while inpatient  -Duonebs as needed for shortness of breath

## 2020-01-10 NOTE — NURSING
"Pt reports heavy menstrual bleeding. States "I am so worried, I think something is wrong." Pt then told daughter at bedside, "If something happens to me, you remember I keep telling them something is wrong and I am bleeding too heavy." Reassurance given, pt told her Hgb and Hct numbers at her request.   Notified MD Dr. Wall of pt concerns.  "

## 2020-01-10 NOTE — ED NOTES
"Pt reports pain 7/10 after second dose of Dilaudid. Pt states, "The pain is coming down, but its still feels bad."   "

## 2020-01-10 NOTE — ED NOTES
Nazanin Malone, an 39 y.o. female presents to the ED c/o joint pain, fatigue, abdominal cramps and dizziness. Pt had her feraheme infusion Tuesday because of her low ferritin and she is sched to get another feraheme infusion  on Tues. Pt reports symptoms are worse since she is on her 2nd day of her menstrual period and losing blood (8soaked pads today). Pt denies taking pain medicine. Pt reports nausea and SOB.       Chief Complaint   Patient presents with    Sickle Cell Pain Crisis     Joint pain.      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 4/29/2017    Asthma     Depression     Hypertension     Morbid obesity     Opioid dependence 4/16/2017    Pneumonia due to Streptococcus pneumoniae 4/25/2017    Right lower lobe pneumonia 4/29/2017    Sepsis due to Streptococcus pneumoniae 4/25/2017    Sickle cell-beta thalassemia disease with pain     Trigeminal neuralgia        LOC: The patient is awake, alert, aware of environment with an appropriate affect. Oriented x4, speaking appropriately  APPEARANCE: Pt resting comfortably, in no acute distress, pt is clean and well groomed, clothing properly fastened  SKIN:The skin is warm and dry, color consistent with ethnicity, patient has normal skin turgor and pale and dry mucus membranes  RESPIRATORY:Airway is open and patent, respirations are spontaneous, patient has a normal effort and rate, no accessory muscle use noted.  CARDIAC: Normal rate and rhythm, no peripheral edema noted, capillary refill < 3 seconds, bilateral radial pulses 2+.  ABDOMEN: Soft, non tender, non distended.   NEUROLOGIC: PERRLA, facial expression is symmetrical, patient moving all extremities spontaneously, normal sensation in all extremities when touched with a finger.  Follows all commands appropriately  MUSCULOSKELETAL:  Patient moving all extremities spontaneously, no obvious swelling or deformities noted.

## 2020-01-10 NOTE — SUBJECTIVE & OBJECTIVE
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      Current Outpatient Medications on File Prior to Encounter   Medication Sig    albuterol (VENTOLIN HFA) 90 mcg/actuation inhaler Inhale 2 puffs into the lungs every 6 (six) hours as needed for Wheezing or Shortness of Breath. Rescue    albuterol-ipratropium (DUO-NEB) 2.5 mg-0.5 mg/3 mL nebulizer solution Take 3 mLs by nebulization every 6 (six) hours as needed for Wheezing or Shortness of Breath. Rescue    amLODIPine (NORVASC) 10 MG tablet TAKE 1 TABLET BY MOUTH EVERY DAY    ascorbic acid, vitamin C, (VITAMIN C) 500 MG tablet Take 500 mg by mouth once daily.    budesonide-formoterol 80-4.5 mcg (SYMBICORT) 80-4.5 mcg/actuation HFAA Inhale 2 puffs into the lungs 2 (two) times daily. Controller    calcium carbonate (TUMS) 200 mg calcium (500 mg) chewable tablet Take 1 tablet by mouth daily as needed for Heartburn (stomach).    carBAMazepine (TEGRETOL) 200 mg tablet Take 4 tablets (800 mg total) by mouth 2 (two) times daily.    carvedilol (COREG) 12.5 MG tablet Take 2 tablets (25 mg total) by mouth 2 (two) times daily with meals.    carvedilol (COREG) 25 MG tablet Take 1 tablet (25 mg total) by mouth 2 (two) times daily.    cyclobenzaprine (FLEXERIL) 10 MG tablet Take 1 tablet  (10 mg total) by mouth 3 (three) times daily as needed for Muscle spasms.    ergocalciferol (VITAMIN D2) 50,000 unit Cap Take 1 capsule (50,000 Units total) by mouth every 7 days. (Wednesdays)    FLUoxetine 40 MG capsule TAKE 1 CAPSULE BY MOUTH EVERY DAY    fluticasone (FLONASE) 50 mcg/actuation nasal spray 2 sprays (100 mcg total) by Each Nare route once daily.    gabapentin (NEURONTIN) 800 MG tablet Take 1 tablet (800 mg total) by mouth 2 (two) times daily.    hydroCHLOROthiazide (HYDRODIURIL) 25 MG tablet Take 1 tablet (25 mg total) by mouth once daily.    ondansetron (ZOFRAN-ODT) 8 MG TbDL Dissolve 1 tablet (8 mg total) by mouth every 6 (six) hours as needed.    oxyCODONE-acetaminophen (PERCOCET)  mg per tablet Take 1 tablet by mouth every 4 (four) hours as needed for Pain.    prochlorperazine (COMPAZINE) 10 MG tablet Take 1 tablet (10 mg total) by mouth every 6 (six) hours as needed.    senna-docusate 8.6-50 mg (PERICOLACE) 8.6-50 mg per tablet Take 1 tablet by mouth 2 (two) times daily as needed for Constipation.    [DISCONTINUED] acetaminophen (TYLENOL EXTRA STRENGTH) 500 MG tablet Take 1,000 mg by mouth daily as needed for Pain.     Family History     Problem Relation (Age of Onset)    Diabetes Mother    Heart disease Mother, Father        Tobacco Use    Smoking status: Never Smoker    Smokeless tobacco: Never Used   Substance and Sexual Activity    Alcohol use: No    Drug use: No    Sexual activity: Not Currently     Birth control/protection: See Surgical Hx     Review of Systems   Constitutional: Positive for fatigue. Negative for activity change, appetite change, chills and fever.   HENT: Negative for congestion and rhinorrhea.    Respiratory: Positive for shortness of breath. Negative for cough, chest tightness and wheezing.    Cardiovascular: Positive for leg swelling. Negative for chest pain and palpitations.   Gastrointestinal: Negative for abdominal distention, abdominal pain,  constipation, diarrhea, nausea and vomiting.   Genitourinary: Negative for difficulty urinating, dysuria, flank pain, frequency, hematuria and urgency.   Musculoskeletal: Negative for arthralgias, back pain, joint swelling, neck pain and neck stiffness.   Skin: Negative for color change, pallor, rash and wound.   Neurological: Negative for dizziness, weakness, numbness and headaches.   Psychiatric/Behavioral: Negative for agitation and confusion.     Objective:     Vital Signs (Most Recent):  Temp: 98.8 °F (37.1 °C) (01/10/20 0032)  Pulse: 80 (01/10/20 0107)  Resp: 18 (01/10/20 0032)  BP: (!) 143/87 (01/10/20 0107)  SpO2: 97 % (01/10/20 0032) Vital Signs (24h Range):  Temp:  [98.8 °F (37.1 °C)-99.1 °F (37.3 °C)] 98.8 °F (37.1 °C)  Pulse:  [80-89] 80  Resp:  [16-20] 18  SpO2:  [96 %-97 %] 97 %  BP: (138-191)/() 143/87     Weight: (!) 147.2 kg (324 lb 8.3 oz)  Body mass index is 59.35 kg/m².    Physical Exam   Constitutional: She is oriented to person, place, and time. She appears well-developed and well-nourished. No distress.   Obese female in no apparent distress  She does not appear to be in pain   HENT:   Head: Normocephalic and atraumatic.   Eyes: Pupils are equal, round, and reactive to light. No scleral icterus.   Neck: Normal range of motion. Neck supple. No JVD present.   Cardiovascular: Normal rate, regular rhythm and normal heart sounds. Exam reveals no gallop and no friction rub.   No murmur heard.  Pulmonary/Chest: Effort normal and breath sounds normal. No respiratory distress. She has no wheezes. She has no rales.   Decreased breath sounds bilaterally  No wheezes or rales heard  No increased work of breathing on room air   Abdominal: Soft. Bowel sounds are normal. She exhibits no distension. There is no tenderness.   Musculoskeletal: She exhibits edema (1+ pitting lower extremity edema to mid-shin).   Neurological: She is alert and oriented to person, place, and time.   Skin: Skin is warm and  "dry.       Significant Labs:   CBC:   Recent Labs   Lab 01/09/20 1949   WBC 6.48   HGB 9.9*   HCT 32.8*        CMP:   Recent Labs   Lab 01/09/20 1949      K 3.2*      CO2 28   *   BUN 6   CREATININE 0.8   CALCIUM 9.4   PROT 8.1   ALBUMIN 3.7   BILITOT 0.2   ALKPHOS 104   AST 40   ALT 24   ANIONGAP 8   EGFRNONAA >60.0     Magnesium: No results for input(s): MG in the last 48 hours.  Urine Studies:   Recent Labs   Lab 01/09/20 1954   COLORU Red*   APPEARANCEUA Hazy*   PHUR 6.0   SPECGRAV 1.005   PROTEINUA 1+*   GLUCUA Negative   KETONESU Negative   BILIRUBINUA Negative   OCCULTUA 3+*   NITRITE Negative   LEUKOCYTESUR Trace*   RBCUA >100*   WBCUA 2   BACTERIA None   SQUAMEPITHEL 1   HYALINECASTS 0       Significant Imaging:   EXAMINATION:  XR CHEST PA AND LATERAL    CLINICAL HISTORY:  Provided history is "  Sickle-cell disease without crisis".    TECHNIQUE:  Frontal and lateral views of the chest were performed.    COMPARISON:  07/31/2019.    FINDINGS:  Lateral view is limited by motion blurring.  Cardiomediastinal silhouette is at the upper limits of normal in size but stable.  There are diffusely coarsened interstitial lung markings, similar to the prior study.  No confluent area of consolidation.  No sizable pleural effusion.  No pneumothorax.      Impression       Borderline cardiomegaly and coarsened interstitial lung markings.  No confluent area of consolidation or detrimental change when compared with 07/31/2019.         "

## 2020-01-10 NOTE — NURSING
Pt arrived on the floor from ED via stretcher accompanied by daughter and transport personnel. Pt was AAOx4, calm and cooperative. See admission assess. Pt has call bell in reach, POC reviewed. VSS. MD MILLICENT at bedside.

## 2020-01-10 NOTE — PLAN OF CARE
Plan of care reviewed with pt. Pt remains free of falls or injuries. Pain controlled with PRN meds. Fluids infusing per orders. Daughter remains at bedside. Fall prec reviewed. Pt voices understanding of Plan of care. BLE US completed. VSS, Denies questions or concerns at this time WCTM

## 2020-01-11 PROBLEM — N92.0 MENORRHAGIA WITH REGULAR CYCLE: Status: ACTIVE | Noted: 2020-01-11

## 2020-01-11 PROBLEM — N92.2 EXCESSIVE MENSTRUATION AT PUBERTY: Status: ACTIVE | Noted: 2020-01-11

## 2020-01-11 LAB
ALBUMIN SERPL BCP-MCNC: 3.2 G/DL (ref 3.5–5.2)
ALP SERPL-CCNC: 90 U/L (ref 55–135)
ALT SERPL W/O P-5'-P-CCNC: 25 U/L (ref 10–44)
ANION GAP SERPL CALC-SCNC: 3 MMOL/L (ref 8–16)
AST SERPL-CCNC: 34 U/L (ref 10–40)
BASOPHILS # BLD AUTO: 0.01 K/UL (ref 0–0.2)
BASOPHILS NFR BLD: 0.2 % (ref 0–1.9)
BILIRUB SERPL-MCNC: 0.3 MG/DL (ref 0.1–1)
BUN SERPL-MCNC: 7 MG/DL (ref 6–20)
CALCIUM SERPL-MCNC: 8.5 MG/DL (ref 8.7–10.5)
CHLORIDE SERPL-SCNC: 104 MMOL/L (ref 95–110)
CO2 SERPL-SCNC: 29 MMOL/L (ref 23–29)
CREAT SERPL-MCNC: 0.7 MG/DL (ref 0.5–1.4)
DIFFERENTIAL METHOD: ABNORMAL
EOSINOPHIL # BLD AUTO: 0 K/UL (ref 0–0.5)
EOSINOPHIL NFR BLD: 0 % (ref 0–8)
ERYTHROCYTE [DISTWIDTH] IN BLOOD BY AUTOMATED COUNT: 20.7 % (ref 11.5–14.5)
EST. GFR  (AFRICAN AMERICAN): >60 ML/MIN/1.73 M^2
EST. GFR  (NON AFRICAN AMERICAN): >60 ML/MIN/1.73 M^2
GLUCOSE SERPL-MCNC: 100 MG/DL (ref 70–110)
HCT VFR BLD AUTO: 28.8 % (ref 37–48.5)
HGB BLD-MCNC: 8.5 G/DL (ref 12–16)
IMM GRANULOCYTES # BLD AUTO: 0.02 K/UL (ref 0–0.04)
IMM GRANULOCYTES NFR BLD AUTO: 0.4 % (ref 0–0.5)
LDH SERPL L TO P-CCNC: 268 U/L (ref 110–260)
LYMPHOCYTES # BLD AUTO: 2.4 K/UL (ref 1–4.8)
LYMPHOCYTES NFR BLD: 44.6 % (ref 18–48)
MAGNESIUM SERPL-MCNC: 2 MG/DL (ref 1.6–2.6)
MCH RBC QN AUTO: 18.9 PG (ref 27–31)
MCHC RBC AUTO-ENTMCNC: 29.5 G/DL (ref 32–36)
MCV RBC AUTO: 64 FL (ref 82–98)
MONOCYTES # BLD AUTO: 0.5 K/UL (ref 0.3–1)
MONOCYTES NFR BLD: 8.9 % (ref 4–15)
NEUTROPHILS # BLD AUTO: 2.5 K/UL (ref 1.8–7.7)
NEUTROPHILS NFR BLD: 45.9 % (ref 38–73)
NRBC BLD-RTO: 3 /100 WBC
PHOSPHATE SERPL-MCNC: 3.4 MG/DL (ref 2.7–4.5)
PLATELET # BLD AUTO: 223 K/UL (ref 150–350)
PMV BLD AUTO: 9.6 FL (ref 9.2–12.9)
POTASSIUM SERPL-SCNC: 4.1 MMOL/L (ref 3.5–5.1)
PROT SERPL-MCNC: 6.9 G/DL (ref 6–8.4)
RBC # BLD AUTO: 4.49 M/UL (ref 4–5.4)
RETICS/RBC NFR AUTO: 2.1 % (ref 0.5–2.5)
SODIUM SERPL-SCNC: 136 MMOL/L (ref 136–145)
WBC # BLD AUTO: 5.38 K/UL (ref 3.9–12.7)

## 2020-01-11 PROCEDURE — 85045 AUTOMATED RETICULOCYTE COUNT: CPT

## 2020-01-11 PROCEDURE — 25000003 PHARM REV CODE 250: Performed by: STUDENT IN AN ORGANIZED HEALTH CARE EDUCATION/TRAINING PROGRAM

## 2020-01-11 PROCEDURE — 99233 SBSQ HOSP IP/OBS HIGH 50: CPT | Mod: ,,, | Performed by: HOSPITALIST

## 2020-01-11 PROCEDURE — 63600175 PHARM REV CODE 636 W HCPCS: Performed by: HOSPITALIST

## 2020-01-11 PROCEDURE — 36415 COLL VENOUS BLD VENIPUNCTURE: CPT

## 2020-01-11 PROCEDURE — 83735 ASSAY OF MAGNESIUM: CPT

## 2020-01-11 PROCEDURE — 84100 ASSAY OF PHOSPHORUS: CPT

## 2020-01-11 PROCEDURE — 20600001 HC STEP DOWN PRIVATE ROOM

## 2020-01-11 PROCEDURE — 83615 LACTATE (LD) (LDH) ENZYME: CPT

## 2020-01-11 PROCEDURE — 99233 PR SUBSEQUENT HOSPITAL CARE,LEVL III: ICD-10-PCS | Mod: ,,, | Performed by: HOSPITALIST

## 2020-01-11 PROCEDURE — 85025 COMPLETE CBC W/AUTO DIFF WBC: CPT

## 2020-01-11 PROCEDURE — 63600175 PHARM REV CODE 636 W HCPCS: Performed by: STUDENT IN AN ORGANIZED HEALTH CARE EDUCATION/TRAINING PROGRAM

## 2020-01-11 PROCEDURE — 80053 COMPREHEN METABOLIC PANEL: CPT

## 2020-01-11 RX ORDER — CARBAMAZEPINE 200 MG/1
400 TABLET ORAL 2 TIMES DAILY
Status: DISCONTINUED | OUTPATIENT
Start: 2020-01-11 | End: 2020-01-19 | Stop reason: HOSPADM

## 2020-01-11 RX ADMIN — FLUTICASONE FUROATE AND VILANTEROL TRIFENATATE 1 PUFF: 100; 25 POWDER RESPIRATORY (INHALATION) at 08:01

## 2020-01-11 RX ADMIN — ENOXAPARIN SODIUM 40 MG: 100 INJECTION SUBCUTANEOUS at 04:01

## 2020-01-11 RX ADMIN — KETOROLAC TROMETHAMINE 30 MG: 30 INJECTION, SOLUTION INTRAMUSCULAR at 12:01

## 2020-01-11 RX ADMIN — HYDROMORPHONE HYDROCHLORIDE 2 MG: 1 INJECTION, SOLUTION INTRAMUSCULAR; INTRAVENOUS; SUBCUTANEOUS at 04:01

## 2020-01-11 RX ADMIN — HYDROMORPHONE HYDROCHLORIDE 2 MG: 1 INJECTION, SOLUTION INTRAMUSCULAR; INTRAVENOUS; SUBCUTANEOUS at 07:01

## 2020-01-11 RX ADMIN — CARBAMAZEPINE 800 MG: 200 TABLET ORAL at 08:01

## 2020-01-11 RX ADMIN — CARBAMAZEPINE 400 MG: 200 TABLET ORAL at 08:01

## 2020-01-11 RX ADMIN — HYDROMORPHONE HYDROCHLORIDE 2 MG: 1 INJECTION, SOLUTION INTRAMUSCULAR; INTRAVENOUS; SUBCUTANEOUS at 08:01

## 2020-01-11 RX ADMIN — ACETAMINOPHEN 650 MG: 325 TABLET ORAL at 02:01

## 2020-01-11 RX ADMIN — AMLODIPINE BESYLATE 10 MG: 10 TABLET ORAL at 08:01

## 2020-01-11 RX ADMIN — CARVEDILOL 25 MG: 25 TABLET, FILM COATED ORAL at 08:01

## 2020-01-11 RX ADMIN — HYDROCHLOROTHIAZIDE 25 MG: 12.5 TABLET ORAL at 08:01

## 2020-01-11 RX ADMIN — MEDROXYPROGESTERONE ACETATE 20 MG: 10 TABLET ORAL at 10:01

## 2020-01-11 RX ADMIN — FLUOXETINE 40 MG: 20 CAPSULE ORAL at 08:01

## 2020-01-11 RX ADMIN — KETOROLAC TROMETHAMINE 30 MG: 30 INJECTION, SOLUTION INTRAMUSCULAR at 05:01

## 2020-01-11 RX ADMIN — HYDROMORPHONE HYDROCHLORIDE 2 MG: 1 INJECTION, SOLUTION INTRAMUSCULAR; INTRAVENOUS; SUBCUTANEOUS at 11:01

## 2020-01-11 RX ADMIN — FOLIC ACID 1 MG: 1 TABLET ORAL at 08:01

## 2020-01-11 RX ADMIN — SENNOSIDES AND DOCUSATE SODIUM 1 TABLET: 8.6; 5 TABLET ORAL at 08:01

## 2020-01-11 RX ADMIN — GABAPENTIN 800 MG: 400 CAPSULE ORAL at 08:01

## 2020-01-11 RX ADMIN — MEDROXYPROGESTERONE ACETATE 20 MG: 10 TABLET ORAL at 08:01

## 2020-01-11 RX ADMIN — HYDROMORPHONE HYDROCHLORIDE 2 MG: 1 INJECTION, SOLUTION INTRAMUSCULAR; INTRAVENOUS; SUBCUTANEOUS at 12:01

## 2020-01-11 RX ADMIN — SODIUM CHLORIDE: 0.45 INJECTION, SOLUTION INTRAVENOUS at 04:01

## 2020-01-11 RX ADMIN — ACETAMINOPHEN 650 MG: 325 TABLET ORAL at 08:01

## 2020-01-11 RX ADMIN — SODIUM CHLORIDE: 0.45 INJECTION, SOLUTION INTRAVENOUS at 12:01

## 2020-01-11 NOTE — ASSESSMENT & PLAN NOTE
Home medication: amlodipine 10 mg qd, Coreg 25 mg BID(patient has actually been taking incorrectly 12.5 BID), and HCTZ 25 mg daily  -Patient significantly hypertensive up to 190    Plan:  -Continue amlodipine 10, HCTZ 25, and Coreg 25  -Ensure adequate pain control before giving PRN hydralazine  -Consider adding ACE/ARB for further optimization if not controlled on above regimen

## 2020-01-11 NOTE — NURSING
Returned PRN pain medication to [facility] due to pt being unable to remain awake long enough to answer orientation questions. VSS WCTM

## 2020-01-11 NOTE — ASSESSMENT & PLAN NOTE
38 yo female with sickle cell beta thalassemia presents with bilateral leg pain R>L for 2 days consistent with her typical pain crises. Her pain was unrelieved by her home percocet. She denies any symptoms of infection although she says she was sick 1-2 weeks ago with flu like symptoms but has recovered. She is having some shortness of breath also but denies chest pain or cough. On arrival she is stable and satting well on room air. Interestingly hemoglobin appears to be at baseline and retic count normal but patient does have significant iron and folate deficiency which could cause reticulocyte count to be low. I have low suspicion for acute chest at this time and she does not appear to have infection.  -LE U/S showed no DVT  -Infectious workup: UA without evidence of infection, CXR without focal consolidation. Follow up blood cultures, respiratory infection panel, influenza test    Plan:  -IVF with 1/2NS at 125cc/hr. Caution given lower extremity edema.  -Pain control: IV dilaudid 1mg q3h PRN for moderate pain and IV dilaudid 2 mg q3 for severe pain  -Daily reticulocyte count and LDH  -Folic acid supplementation. Consider Venopher.

## 2020-01-11 NOTE — ASSESSMENT & PLAN NOTE
Patient has a history of intermittent asthma for which she takes symbicort inhaler daily with albuterol or duonebs as needed. She has been experiencing 4 day history of shortness of breath with exertion, improved with rest, with associated leg swelling. No orthopnea or PND. Differential includes asthma exacerbation vs HF vs anemia vs deconditioning vs PE. She has used her inhaler without relief. Her las echo was in August and showed low normal EF 50-55% and concentric LVH.     Plan:  -TTE: 60% EF. Concentric LVH. Indeterminate amount of diastolic dysfunction.   -Continue controller inhaler while inpatient  -Duonebs as needed for shortness of breath

## 2020-01-11 NOTE — PHARMACY MED REC
"Admission Medication Reconciliation - Pharmacy Consult Note    The home medication history was taken by Susannah Floyd.  Based on information gathered and subsequent review by the clinical pharmacist, the items below may need attention.     You may go to "Admission" then "Reconcile Home Medications" tabs to review and/or act upon these items.     Potentially problematic discrepancies with current MAR  o Patient IS NOT taking the following which was ordered upon admit  o Gabapentin   o Breo  o HCTZ  o Patient is taking a drug DIFFERENTLY than how ordered upon admit  o Carbamazepine 400mg BID    Potential issues to be addressed PRIOR TO DISCHARGE  o Patient reports not taking the hydrochlorothiazide, gabapentin, and Symbicort. If the intention is to continue these at discharge, patient will need education to increase compliance.     Please address this information as you see fit.  Feel free to contact us if you have any questions or require assistance.    Rigoberto Merida, PharmD  [id number]                  .    .            "

## 2020-01-11 NOTE — SUBJECTIVE & OBJECTIVE
Interval History: NAEON.  Patient's pain well controlled with current pain regimen. No complaints.     Review of Systems   Constitutional: Negative for chills and fever.   Respiratory: Negative for cough and shortness of breath.    Cardiovascular: Negative for chest pain and leg swelling.   Gastrointestinal: Negative for abdominal distention, abdominal pain, nausea and vomiting.   Musculoskeletal: Negative for arthralgias and back pain.   Neurological: Negative for weakness and headaches.     Objective:     Vital Signs (Most Recent):  Temp: 98.5 °F (36.9 °C) (01/11/20 0802)  Pulse: 87 (01/11/20 0802)  Resp: 18 (01/11/20 0802)  BP: (!) 154/78 (01/11/20 0802)  SpO2: (!) 93 % (01/11/20 0802) Vital Signs (24h Range):  Temp:  [97.4 °F (36.3 °C)-98.7 °F (37.1 °C)] 98.5 °F (36.9 °C)  Pulse:  [78-91] 87  Resp:  [17-18] 18  SpO2:  [90 %-95 %] 93 %  BP: (127-165)/(71-91) 154/78     Weight: (!) 147 kg (324 lb)  Body mass index is 59.26 kg/m².    Intake/Output Summary (Last 24 hours) at 1/11/2020 0923  Last data filed at 1/11/2020 0600  Gross per 24 hour   Intake 2330 ml   Output --   Net 2330 ml      Physical Exam   Constitutional: She is oriented to person, place, and time.   Patient is a morbidly obese BF who appears stated age, alert, and in NAD.    HENT:   Head: Normocephalic and atraumatic.   Eyes: Pupils are equal, round, and reactive to light. EOM are normal.   Neck: Normal range of motion. Neck supple.   Cardiovascular: Normal rate, regular rhythm and normal heart sounds.   Pulmonary/Chest: Effort normal and breath sounds normal. No stridor. No respiratory distress. She has no wheezes.   Abdominal: Soft. Bowel sounds are normal. She exhibits no distension. There is no tenderness.   Musculoskeletal: Normal range of motion. She exhibits no edema.   Neurological: She is alert and oriented to person, place, and time.   Skin: Skin is warm and dry. Capillary refill takes less than 2 seconds.   Psychiatric: She has a normal  mood and affect.       Significant Labs:   CBC:   Recent Labs   Lab 01/10/20  0302 01/10/20  1331 01/11/20  0450   WBC 7.32 5.72 5.38   HGB 9.0* 8.8* 8.5*   HCT 30.4* 30.3* 28.8*    260 223     CMP:   Recent Labs   Lab 01/09/20  1949 01/10/20  0302 01/11/20  0450    141 136   K 3.2* 3.3* 4.1    106 104   CO2 28 26 29   * 95 100   BUN 6 5* 7   CREATININE 0.8 0.7 0.7   CALCIUM 9.4 8.6* 8.5*   PROT 8.1 7.6 6.9   ALBUMIN 3.7 3.6 3.2*   BILITOT 0.2 0.3 0.3   ALKPHOS 104 98 90   AST 40 32 34   ALT 24 23 25   ANIONGAP 8 9 3*   EGFRNONAA >60.0 >60.0 >60.0       Significant Imaging: I have reviewed and interpreted all pertinent imaging results/findings within the past 24 hours.

## 2020-01-11 NOTE — ASSESSMENT & PLAN NOTE
Patient has a history of menorrhagia and most recently had a ferritin of 2. She is s/p 1 dose of feraheme on Tuesday and is supposed to receive another dose next Tuesday. She has tried oral iron supplementation in the past but remembers not being able to tolerate . Patient states that she chronically has heavy menstrual periods; however, this current cycle is much heavier than usual.   -Pelvic U/s showed enlarged uterus with normal endometrium     Plan:  -OBGYN consulted for AUB, appreciate recs  -started medroxyprogesterone x7 days to help decrease her acute bleeding.   -Trend CBC daily  -Consider Venofer while inpatient  -Outpatient f/u with OBGYN

## 2020-01-11 NOTE — PLAN OF CARE
Pt remains free of falls or injuries. 1/2 NS infusing 125ml/hr. Pain controlled with combination of scheduled and PRN medications. Care clustered to allow for rest period. Daughter remains at bedside. VSS. JESUSTM

## 2020-01-11 NOTE — PROGRESS NOTES
[facility] Hospital Medicine  Progress Note    Patient Name: Nazanin Malone  MRN: [id number]  Patient Class: IP- Inpatient   Admission Date: 1/9/2020  Length of Stay: 2 days  Attending Physician: Johnny Bryant MD  Primary Care Provider: [facility] Doctor [facility] Medicine Team: [facility] [place] [facility] 4 Waldemar Duffy MD    Subjective:     Principal Problem:Sickle cell disease, type S beta-zero thalassemia with crisis        HPI:  Ms. Malone is a 40 yo female with past medical history of sickle cell beta thalassemia, HTN, depression, and asthma who presents with chief complaint of pain in her legs.  She says that the pain started about 2 days ago and the pain is worse in her right leg than her left. The pain is associated with swelling and is located more in the bone rather than a particular joint. She says the pain is similar to previous sickle cell crises. She has tried her home percocet  mg q4 hours without significant relief. She usually uses this as a telling sign that she needs to come to the hospital. She is also complaining of some fatigue and shortness of breath with exertion. She denies chest pain, cough, congestion, fevers, chills, nausea, vomiting, diarrhea, constipation. She is currently on her menstrual period which is usually heavy. She says that she had a cold a week or two ago which she has recovered from. 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 She has not yet received her flu vaccination.    She follows with Dr. Montgomery for her sickle cell and was most recently saw on 12/27. She has been admitted 3 times in the last year for crisis(most recently in august 2019). Triggers for her usually include illness and changes in the weather. She says she has had acute chest in the past but cannot remember the last time. She cannot remember the last time she has received a blood transfusion. It appears her baseline hemoglobin lies between 9-10. She  has tried hydroxyurea before but did not tolerate/had a rash. Last time she saw Dr. Montgomery, she had low ferritin of 2 and was recommended to be given feraheme. She got one dose this past Tuesday and is scheduled to get another dose on Tuesday. It is suspected that low ferritin 2/2 heavy menstrual bleeding and it was recommended the patient follow up with OBGYN.    In the ED, the patient is afebrile, hypertensive in 180s and satting 97% on room air. Labs are notable for normal WBC, H/H 9.9/32.8, reticulocyte count 2.0, K 3 and rest of CMP within normal limits. Her UA had 1+ protein and >100 RBCs however patient on menstrual period. CXR obtained shows borderline cardiomegaly, coarsened interstitial lung markings, but no focal consolidation. She was given dilaudid 1 mg x 2, oral potassium, and 2L NS bolus.     Overview/Hospital Course:  Patient is admitted to [facility]4 for sickle cell crisis. She was started on IVF and pain regimen includes home percocet and IV dilaudid for severe pain.  Patient also c/o of menorrhagia much more so than her normal amount (she has heavy menstrual periods usually but this is much worse than normal.)   OBGYN consulted.  Patient was started on medroxyprogesterone for 7 days to help decrease her bleeding in this current setting.  Recommend patient follow up obgyn outpatient to discuss further options for AUB control including IUD vs hysterectomy.  TTE was preformed as patient c/o of dyspnea; it showed EF of 60% with some indeterminate amount of diastolic dysfunction.  Patient's pain appears well controlled with current pain regimen.     Interval History: NAEON.  Patient's pain well controlled with current pain regimen. No complaints.     Review of Systems   Constitutional: Negative for chills and fever.   Respiratory: Negative for cough and shortness of breath.    Cardiovascular: Negative for chest pain and leg swelling.   Gastrointestinal: Negative for abdominal distention, abdominal pain,  nausea and vomiting.   Musculoskeletal: Negative for arthralgias and back pain.   Neurological: Negative for weakness and headaches.     Objective:     Vital Signs (Most Recent):  Temp: 98.5 °F (36.9 °C) (01/11/20 0802)  Pulse: 87 (01/11/20 0802)  Resp: 18 (01/11/20 0802)  BP: (!) 154/78 (01/11/20 0802)  SpO2: (!) 93 % (01/11/20 0802) Vital Signs (24h Range):  Temp:  [97.4 °F (36.3 °C)-98.7 °F (37.1 °C)] 98.5 °F (36.9 °C)  Pulse:  [78-91] 87  Resp:  [17-18] 18  SpO2:  [90 %-95 %] 93 %  BP: (127-165)/(71-91) 154/78     Weight: (!) 147 kg (324 lb)  Body mass index is 59.26 kg/m².    Intake/Output Summary (Last 24 hours) at 1/11/2020 0923  Last data filed at 1/11/2020 0600  Gross per 24 hour   Intake 2330 ml   Output --   Net 2330 ml      Physical Exam   Constitutional: She is oriented to person, place, and time.   Patient is a morbidly obese BF who appears stated age, alert, and in NAD.    HENT:   Head: Normocephalic and atraumatic.   Eyes: Pupils are equal, round, and reactive to light. EOM are normal.   Neck: Normal range of motion. Neck supple.   Cardiovascular: Normal rate, regular rhythm and normal heart sounds.   Pulmonary/Chest: Effort normal and breath sounds normal. No stridor. No respiratory distress. She has no wheezes.   Abdominal: Soft. Bowel sounds are normal. She exhibits no distension. There is no tenderness.   Musculoskeletal: Normal range of motion. She exhibits no edema.   Neurological: She is alert and oriented to person, place, and time.   Skin: Skin is warm and dry. Capillary refill takes less than 2 seconds.   Psychiatric: She has a normal mood and affect.       Significant Labs:   CBC:   Recent Labs   Lab 01/10/20  0302 01/10/20  1331 01/11/20  0450   WBC 7.32 5.72 5.38   HGB 9.0* 8.8* 8.5*   HCT 30.4* 30.3* 28.8*    260 223     CMP:   Recent Labs   Lab 01/09/20  1949 01/10/20  0302 01/11/20  0450    141 136   K 3.2* 3.3* 4.1    106 104   CO2 28 26 29   * 95 100   BUN  6 5* 7   CREATININE 0.8 0.7 0.7   CALCIUM 9.4 8.6* 8.5*   PROT 8.1 7.6 6.9   ALBUMIN 3.7 3.6 3.2*   BILITOT 0.2 0.3 0.3   ALKPHOS 104 98 90   AST 40 32 34   ALT 24 23 25   ANIONGAP 8 9 3*   EGFRNONAA >60.0 >60.0 >60.0       Significant Imaging: I have reviewed and interpreted all pertinent imaging results/findings within the past 24 hours.      Assessment/Plan:      * Sickle cell disease, type S beta-zero thalassemia with crisis  38 yo female with sickle cell beta thalassemia presents with bilateral leg pain R>L for 2 days consistent with her typical pain crises. Her pain was unrelieved by her home percocet. She denies any symptoms of infection although she says she was sick 1-2 weeks ago with flu like symptoms but has recovered. She is having some shortness of breath also but denies chest pain or cough. On arrival she is stable and satting well on room air. Interestingly hemoglobin appears to be at baseline and retic count normal but patient does have significant iron and folate deficiency which could cause reticulocyte count to be low. I have low suspicion for acute chest at this time and she does not appear to have infection.  -LE U/S showed no DVT  -Infectious workup: UA without evidence of infection, CXR without focal consolidation. Follow up blood cultures, respiratory infection panel, influenza test    Plan:  -IVF with 1/2NS at 125cc/hr. Caution given lower extremity edema.  -Pain control: IV dilaudid 1mg q3h PRN for moderate pain and IV dilaudid 2 mg q3 for severe pain  -Daily reticulocyte count and LDH  -Folic acid supplementation. Consider Venopher.          Menorrhagia with regular cycle  See SAMUEL acute blood loss      Hypokalemia  3.2 on admission s/p 40 mEq x2    Plan:  -Replete as needed      Folate deficiency  Levels were 2.9 about 2 weeks ago. Patient not on supplementation at home    Plan:  -continue start folic acid 1 mg daily      Shortness of breath  Patient has a history of intermittent asthma  for which she takes symbicort inhaler daily with albuterol or duonebs as needed. She has been experiencing 4 day history of shortness of breath with exertion, improved with rest, with associated leg swelling. No orthopnea or PND. Differential includes asthma exacerbation vs HF vs anemia vs deconditioning vs PE. She has used her inhaler without relief. Her las echo was in August and showed low normal EF 50-55% and concentric LVH.     Plan:  -TTE: 60% EF. Concentric LVH. Indeterminate amount of diastolic dysfunction.   -Continue controller inhaler while inpatient  -Duonebs as needed for shortness of breath      Iron deficiency anemia due to chronic blood loss  Patient has a history of menorrhagia and most recently had a ferritin of 2. She is s/p 1 dose of feraheme on Tuesday and is supposed to receive another dose next Tuesday. She has tried oral iron supplementation in the past but remembers not being able to tolerate . Patient states that she chronically has heavy menstrual periods; however, this current cycle is much heavier than usual.   -Pelvic U/s showed enlarged uterus with normal endometrium     Plan:  -OBGYN consulted for AUB, appreciate recs  -started medroxyprogesterone x7 days to help decrease her acute bleeding.   -Trend CBC daily  -Consider Venofer while inpatient  -Outpatient f/u with OBGYN        Trigeminal neuralgia  Home medication: Carbamazepine 800 mg BID    Plan:  -Continue home Carbamazepine      Benign essential HTN  Home medication: amlodipine 10 mg qd, Coreg 25 mg BID(patient has actually been taking incorrectly 12.5 BID), and HCTZ 25 mg daily  -Patient significantly hypertensive up to 190    Plan:  -Continue amlodipine 10, HCTZ 25, and Coreg 25  -Ensure adequate pain control before giving PRN hydralazine  -Consider adding ACE/ARB for further optimization if not controlled on above regimen        VTE Risk Mitigation (From admission, onward)         Ordered     enoxaparin injection 40 mg  Daily       01/10/20 0015     Place sequential compression device  Until discontinued      01/10/20 0219     IP VTE HIGH RISK PATIENT  Once      01/10/20 0015     IP VTE HIGH RISK PATIENT  Once      01/10/20 0015                      Waldemar Duffy MD  Department of Hospital Medicine   [facility]

## 2020-01-11 NOTE — ASSESSMENT & PLAN NOTE
Levels were 2.9 about 2 weeks ago. Patient not on supplementation at home    Plan:  -continue start folic acid 1 mg daily

## 2020-01-12 LAB
ALBUMIN SERPL BCP-MCNC: 3.4 G/DL (ref 3.5–5.2)
ALP SERPL-CCNC: 90 U/L (ref 55–135)
ALT SERPL W/O P-5'-P-CCNC: 24 U/L (ref 10–44)
ANION GAP SERPL CALC-SCNC: 9 MMOL/L (ref 8–16)
ANISOCYTOSIS BLD QL SMEAR: SLIGHT
AST SERPL-CCNC: 37 U/L (ref 10–40)
BASOPHILS # BLD AUTO: 0.03 K/UL (ref 0–0.2)
BASOPHILS # BLD AUTO: 0.03 K/UL (ref 0–0.2)
BASOPHILS NFR BLD: 0.3 % (ref 0–1.9)
BASOPHILS NFR BLD: 0.3 % (ref 0–1.9)
BILIRUB SERPL-MCNC: 0.3 MG/DL (ref 0.1–1)
BUN SERPL-MCNC: 11 MG/DL (ref 6–20)
CALCIUM SERPL-MCNC: 8.4 MG/DL (ref 8.7–10.5)
CHLORIDE SERPL-SCNC: 104 MMOL/L (ref 95–110)
CO2 SERPL-SCNC: 24 MMOL/L (ref 23–29)
CREAT SERPL-MCNC: 0.8 MG/DL (ref 0.5–1.4)
DIFFERENTIAL METHOD: ABNORMAL
DIFFERENTIAL METHOD: ABNORMAL
EOSINOPHIL # BLD AUTO: 0 K/UL (ref 0–0.5)
EOSINOPHIL # BLD AUTO: 0 K/UL (ref 0–0.5)
EOSINOPHIL NFR BLD: 0 % (ref 0–8)
EOSINOPHIL NFR BLD: 0.1 % (ref 0–8)
ERYTHROCYTE [DISTWIDTH] IN BLOOD BY AUTOMATED COUNT: 21 % (ref 11.5–14.5)
ERYTHROCYTE [DISTWIDTH] IN BLOOD BY AUTOMATED COUNT: 21.7 % (ref 11.5–14.5)
EST. GFR  (AFRICAN AMERICAN): >60 ML/MIN/1.73 M^2
EST. GFR  (NON AFRICAN AMERICAN): >60 ML/MIN/1.73 M^2
GLUCOSE SERPL-MCNC: 115 MG/DL (ref 70–110)
HCT VFR BLD AUTO: 26.6 % (ref 37–48.5)
HCT VFR BLD AUTO: 27.2 % (ref 37–48.5)
HGB BLD-MCNC: 8 G/DL (ref 12–16)
HGB BLD-MCNC: 8.5 G/DL (ref 12–16)
HYPOCHROMIA BLD QL SMEAR: ABNORMAL
IMM GRANULOCYTES # BLD AUTO: 0.06 K/UL (ref 0–0.04)
IMM GRANULOCYTES # BLD AUTO: 0.09 K/UL (ref 0–0.04)
IMM GRANULOCYTES NFR BLD AUTO: 0.6 % (ref 0–0.5)
IMM GRANULOCYTES NFR BLD AUTO: 0.9 % (ref 0–0.5)
LYMPHOCYTES # BLD AUTO: 1.8 K/UL (ref 1–4.8)
LYMPHOCYTES # BLD AUTO: 4.5 K/UL (ref 1–4.8)
LYMPHOCYTES NFR BLD: 17.6 % (ref 18–48)
LYMPHOCYTES NFR BLD: 46.7 % (ref 18–48)
MAGNESIUM SERPL-MCNC: 2 MG/DL (ref 1.6–2.6)
MCH RBC QN AUTO: 19.6 PG (ref 27–31)
MCH RBC QN AUTO: 19.8 PG (ref 27–31)
MCHC RBC AUTO-ENTMCNC: 30.1 G/DL (ref 32–36)
MCHC RBC AUTO-ENTMCNC: 31.3 G/DL (ref 32–36)
MCV RBC AUTO: 63 FL (ref 82–98)
MCV RBC AUTO: 65 FL (ref 82–98)
MONOCYTES # BLD AUTO: 0.7 K/UL (ref 0.3–1)
MONOCYTES # BLD AUTO: 0.9 K/UL (ref 0.3–1)
MONOCYTES NFR BLD: 6.4 % (ref 4–15)
MONOCYTES NFR BLD: 9.8 % (ref 4–15)
NEUTROPHILS # BLD AUTO: 4 K/UL (ref 1.8–7.7)
NEUTROPHILS # BLD AUTO: 7.8 K/UL (ref 1.8–7.7)
NEUTROPHILS NFR BLD: 42.2 % (ref 38–73)
NEUTROPHILS NFR BLD: 75.1 % (ref 38–73)
NRBC BLD-RTO: 3 /100 WBC
NRBC BLD-RTO: 6 /100 WBC
OVALOCYTES BLD QL SMEAR: ABNORMAL
PHOSPHATE SERPL-MCNC: 4.1 MG/DL (ref 2.7–4.5)
PLATELET # BLD AUTO: 154 K/UL (ref 150–350)
PLATELET # BLD AUTO: 213 K/UL (ref 150–350)
PLATELET BLD QL SMEAR: ABNORMAL
PMV BLD AUTO: 9 FL (ref 9.2–12.9)
PMV BLD AUTO: 9.7 FL (ref 9.2–12.9)
POIKILOCYTOSIS BLD QL SMEAR: SLIGHT
POLYCHROMASIA BLD QL SMEAR: ABNORMAL
POTASSIUM SERPL-SCNC: 4.6 MMOL/L (ref 3.5–5.1)
PROT SERPL-MCNC: 7.3 G/DL (ref 6–8.4)
RBC # BLD AUTO: 4.08 M/UL (ref 4–5.4)
RBC # BLD AUTO: 4.29 M/UL (ref 4–5.4)
RETICS/RBC NFR AUTO: 3.7 % (ref 0.5–2.5)
SODIUM SERPL-SCNC: 137 MMOL/L (ref 136–145)
TARGETS BLD QL SMEAR: ABNORMAL
WBC # BLD AUTO: 10.38 K/UL (ref 3.9–12.7)
WBC # BLD AUTO: 9.56 K/UL (ref 3.9–12.7)

## 2020-01-12 PROCEDURE — 94761 N-INVAS EAR/PLS OXIMETRY MLT: CPT

## 2020-01-12 PROCEDURE — 25000003 PHARM REV CODE 250: Performed by: STUDENT IN AN ORGANIZED HEALTH CARE EDUCATION/TRAINING PROGRAM

## 2020-01-12 PROCEDURE — 63600175 PHARM REV CODE 636 W HCPCS: Performed by: STUDENT IN AN ORGANIZED HEALTH CARE EDUCATION/TRAINING PROGRAM

## 2020-01-12 PROCEDURE — 25000003 PHARM REV CODE 250: Performed by: HOSPITALIST

## 2020-01-12 PROCEDURE — 80053 COMPREHEN METABOLIC PANEL: CPT

## 2020-01-12 PROCEDURE — 20600001 HC STEP DOWN PRIVATE ROOM

## 2020-01-12 PROCEDURE — 83735 ASSAY OF MAGNESIUM: CPT

## 2020-01-12 PROCEDURE — 99233 SBSQ HOSP IP/OBS HIGH 50: CPT | Mod: ,,, | Performed by: HOSPITALIST

## 2020-01-12 PROCEDURE — 99233 PR SUBSEQUENT HOSPITAL CARE,LEVL III: ICD-10-PCS | Mod: ,,, | Performed by: HOSPITALIST

## 2020-01-12 PROCEDURE — 36415 COLL VENOUS BLD VENIPUNCTURE: CPT

## 2020-01-12 PROCEDURE — 84100 ASSAY OF PHOSPHORUS: CPT

## 2020-01-12 PROCEDURE — 99900035 HC TECH TIME PER 15 MIN (STAT)

## 2020-01-12 PROCEDURE — 63600175 PHARM REV CODE 636 W HCPCS: Performed by: HOSPITALIST

## 2020-01-12 PROCEDURE — 85045 AUTOMATED RETICULOCYTE COUNT: CPT

## 2020-01-12 PROCEDURE — 85025 COMPLETE CBC W/AUTO DIFF WBC: CPT | Mod: 91

## 2020-01-12 RX ORDER — HYDROMORPHONE HYDROCHLORIDE 1 MG/ML
1.5 INJECTION, SOLUTION INTRAMUSCULAR; INTRAVENOUS; SUBCUTANEOUS EVERY 4 HOURS PRN
Status: DISCONTINUED | OUTPATIENT
Start: 2020-01-12 | End: 2020-01-13

## 2020-01-12 RX ORDER — OXYCODONE HYDROCHLORIDE 5 MG/1
15 TABLET ORAL EVERY 4 HOURS
Status: DISCONTINUED | OUTPATIENT
Start: 2020-01-12 | End: 2020-01-13

## 2020-01-12 RX ORDER — OXYCODONE HYDROCHLORIDE 5 MG/1
10 TABLET ORAL EVERY 4 HOURS
Status: DISCONTINUED | OUTPATIENT
Start: 2020-01-12 | End: 2020-01-12

## 2020-01-12 RX ORDER — HYDROMORPHONE HYDROCHLORIDE 1 MG/ML
1 INJECTION, SOLUTION INTRAMUSCULAR; INTRAVENOUS; SUBCUTANEOUS ONCE
Status: COMPLETED | OUTPATIENT
Start: 2020-01-12 | End: 2020-01-12

## 2020-01-12 RX ADMIN — ACETAMINOPHEN 650 MG: 325 TABLET ORAL at 09:01

## 2020-01-12 RX ADMIN — GABAPENTIN 800 MG: 400 CAPSULE ORAL at 08:01

## 2020-01-12 RX ADMIN — ACETAMINOPHEN 650 MG: 325 TABLET ORAL at 02:01

## 2020-01-12 RX ADMIN — HYDROMORPHONE HYDROCHLORIDE 1.5 MG: 1 INJECTION, SOLUTION INTRAMUSCULAR; INTRAVENOUS; SUBCUTANEOUS at 11:01

## 2020-01-12 RX ADMIN — HYDROMORPHONE HYDROCHLORIDE 1 MG: 1 INJECTION, SOLUTION INTRAMUSCULAR; INTRAVENOUS; SUBCUTANEOUS at 02:01

## 2020-01-12 RX ADMIN — SENNOSIDES AND DOCUSATE SODIUM 1 TABLET: 8.6; 5 TABLET ORAL at 08:01

## 2020-01-12 RX ADMIN — CARBAMAZEPINE 400 MG: 200 TABLET ORAL at 08:01

## 2020-01-12 RX ADMIN — MEDROXYPROGESTERONE ACETATE 20 MG: 10 TABLET ORAL at 08:01

## 2020-01-12 RX ADMIN — ENOXAPARIN SODIUM 40 MG: 100 INJECTION SUBCUTANEOUS at 06:01

## 2020-01-12 RX ADMIN — HYDROMORPHONE HYDROCHLORIDE 2 MG: 1 INJECTION, SOLUTION INTRAMUSCULAR; INTRAVENOUS; SUBCUTANEOUS at 08:01

## 2020-01-12 RX ADMIN — HYDROCHLOROTHIAZIDE 25 MG: 12.5 TABLET ORAL at 08:01

## 2020-01-12 RX ADMIN — HYDROMORPHONE HYDROCHLORIDE 1.5 MG: 1 INJECTION, SOLUTION INTRAMUSCULAR; INTRAVENOUS; SUBCUTANEOUS at 05:01

## 2020-01-12 RX ADMIN — FLUTICASONE FUROATE AND VILANTEROL TRIFENATATE 1 PUFF: 100; 25 POWDER RESPIRATORY (INHALATION) at 08:01

## 2020-01-12 RX ADMIN — CARVEDILOL 25 MG: 25 TABLET, FILM COATED ORAL at 08:01

## 2020-01-12 RX ADMIN — MEDROXYPROGESTERONE ACETATE 20 MG: 10 TABLET ORAL at 02:01

## 2020-01-12 RX ADMIN — SODIUM CHLORIDE: 0.45 INJECTION, SOLUTION INTRAVENOUS at 02:01

## 2020-01-12 RX ADMIN — SODIUM CHLORIDE: 0.45 INJECTION, SOLUTION INTRAVENOUS at 01:01

## 2020-01-12 RX ADMIN — FOLIC ACID 1 MG: 1 TABLET ORAL at 08:01

## 2020-01-12 RX ADMIN — HYDROMORPHONE HYDROCHLORIDE 1.5 MG: 1 INJECTION, SOLUTION INTRAMUSCULAR; INTRAVENOUS; SUBCUTANEOUS at 12:01

## 2020-01-12 RX ADMIN — HYDROMORPHONE HYDROCHLORIDE 2 MG: 1 INJECTION, SOLUTION INTRAMUSCULAR; INTRAVENOUS; SUBCUTANEOUS at 05:01

## 2020-01-12 RX ADMIN — AMLODIPINE BESYLATE 10 MG: 10 TABLET ORAL at 08:01

## 2020-01-12 RX ADMIN — OXYCODONE HYDROCHLORIDE 15 MG: 5 TABLET ORAL at 06:01

## 2020-01-12 RX ADMIN — FLUOXETINE 40 MG: 20 CAPSULE ORAL at 08:01

## 2020-01-12 RX ADMIN — HYDROMORPHONE HYDROCHLORIDE 2 MG: 1 INJECTION, SOLUTION INTRAMUSCULAR; INTRAVENOUS; SUBCUTANEOUS at 01:01

## 2020-01-12 NOTE — PLAN OF CARE
Plan of care reviewed with patient. Pain control achieved with PO and PRN IV medications. Pt remains free of falls or injuries. Daughter remains at bedside. 1/2 NS infusing at 75 ml hr. Pt is compliant with all meds and requests. Pt denies questions or concerns at this time. VSS. WCTM

## 2020-01-12 NOTE — NURSING
"Pain management:    Called into pt room in reference to pt's complaining of pain to rt lower leg.  She informed me that' "she was not informed of her iv dilaudid being decrease to 1.5mg."  I explained to pt that the the purpose of decreasing the dose is to transition you to the pill form.  Pt verbalize complete understanding, but requested iv dilaudid now since she did not take the schedule dose at 1100.  Pt was given a x1 dose 1mg of dilaudid and is scheudle prn every 4hrs and oxycodone po increase to 15mg every 4 hrs- next schedule at 1800.  I informed pt not to refused to schedule dose of oxycodone.  The plan is to stop iv dilaudid by tomorrow if not by Tuesday.  Pt verbalize understanding, but insisted the iv dilaudid increase to 2mg every3 hrs.  I spoke to the team and I reinforce with patient the pain regimen will remain the same until further notice.  Pain regimen consist of iv dilaudid every 4hrs and schedule oxycodoen 15mg po every 4hrs.  Steam isTeam notified of   "

## 2020-01-12 NOTE — PLAN OF CARE
1/2 NS running per MD order. 2mg IV dilauded given at 0828. 1.5mg IV dilauded given at 1227. Plan of care discussed with patient. Fall precautions in place. Discussed medications and care. Patient has no questions at this time. Will continue to monitor.

## 2020-01-12 NOTE — PROGRESS NOTES
[facility] Hospital Medicine  Progress Note    Patient Name: Nazanin Malone  MRN: [id number]  Patient Class: IP- Inpatient   Admission Date: 1/9/2020  Length of Stay: 3 days  Attending Physician: Johnny Bryant MD  Primary Care Provider: Primary Doctor [facility] Medicine Team: [facility] [place] [facility] 4 Darrel Stone MD    Subjective:     Principal Problem:Sickle cell disease, type S beta-zero thalassemia with crisis    HPI:  Ms. Malone is a 40 yo female with past medical history of sickle cell beta thalassemia, HTN, depression, and asthma who presents with chief complaint of pain in her legs.  She says that the pain started about 2 days ago and the pain is worse in her right leg than her left. The pain is associated with swelling and is located more in the bone rather than a particular joint. She says the pain is similar to previous sickle cell crises. She has tried her home percocet  mg q4 hours without significant relief. She usually uses this as a telling sign that she needs to come to the hospital. She is also complaining of some fatigue and shortness of breath with exertion. She denies chest pain, cough, congestion, fevers, chills, nausea, vomiting, diarrhea, constipation. She is currently on her menstrual period which is usually heavy. She says that she had a cold a week or two ago which she has recovered from. 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 She has not yet received her flu vaccination.    She follows with Dr. Montgomery for her sickle cell and was most recently saw on 12/27. She has been admitted 3 times in the last year for crisis(most recently in august 2019). Triggers for her usually include illness and changes in the weather. She says she has had acute chest in the past but cannot remember the last time. She cannot remember the last time she has received a blood transfusion. It appears her baseline hemoglobin lies between 9-10. She  has tried hydroxyurea before but did not tolerate/had a rash. Last time she saw Dr. Montgomery, she had low ferritin of 2 and was recommended to be given feraheme. She got one dose this past Tuesday and is scheduled to get another dose on Tuesday. It is suspected that low ferritin 2/2 heavy menstrual bleeding and it was recommended the patient follow up with OBGYN.    In the ED, the patient is afebrile, hypertensive in 180s and satting 97% on room air. Labs are notable for normal WBC, H/H 9.9/32.8, reticulocyte count 2.0, K 3 and rest of CMP within normal limits. Her UA had 1+ protein and >100 RBCs however patient on menstrual period. CXR obtained shows borderline cardiomegaly, coarsened interstitial lung markings, but no focal consolidation. She was given dilaudid 1 mg x 2, oral potassium, and 2L NS bolus.     Overview/Hospital Course:  Patient is admitted to IM4 for sickle cell crisis. She was started on IVF and pain regimen includes home percocet and IV dilaudid for severe pain.  Patient also c/o of menorrhagia much more so than her normal amount (she has heavy menstrual periods usually but this is much worse than normal.)   OBGYN consulted.  Patient was started on medroxyprogesterone for 7 days to help decrease her bleeding in this current setting.  Recommend patient follow up obgyn outpatient to discuss further options for AUB control including IUD vs hysterectomy.  TTE was preformed as patient c/o of dyspnea; it showed EF of 60% with some indeterminate amount of diastolic dysfunction.  Patient's pain appears well controlled with current pain regimen. Patient's H/H continues to drop with increase in her retic count. Suspecting anemia 2/2 menstrual period which has improved with starting progesterone and folate, b12 supplementation. Her pain is improving but still present. Improves with warm packs but declined topical pain relievers. Will decrease frequency of the patient's Dilaudid and schedule PO pain  medications.     Interval History:     NAEON. See hospital course above.     Review of Systems   Constitutional: Negative for chills and fever.   Respiratory: Negative for cough and shortness of breath.    Cardiovascular: Negative for chest pain and leg swelling.   Gastrointestinal: Negative for abdominal distention, abdominal pain, nausea and vomiting.   Musculoskeletal: Negative for arthralgias and back pain.   Neurological: Negative for weakness and headaches.     Objective:     Vital Signs (Most Recent):  Temp: 98.5 °F (36.9 °C) (01/12/20 0743)  Pulse: 80 (01/12/20 0833)  Resp: 18 (01/12/20 0833)  BP: (!) 145/63 (01/12/20 0743)  SpO2: (!) 94 % (01/12/20 0743) Vital Signs (24h Range):  Temp:  [98.2 °F (36.8 °C)-99.1 °F (37.3 °C)] 98.5 °F (36.9 °C)  Pulse:  [80-90] 80  Resp:  [16-18] 18  SpO2:  [91 %-97 %] 94 %  BP: (112-176)/(59-93) 145/63     Weight: (!) 147 kg (324 lb)  Body mass index is 59.26 kg/m².    Intake/Output Summary (Last 24 hours) at 1/12/2020 0927  Last data filed at 1/12/2020 0900  Gross per 24 hour   Intake 2835 ml   Output --   Net 2835 ml      Physical Exam   Constitutional: She is oriented to person, place, and time.   Patient is a morbidly obese BF who appears stated age, alert, and in NAD.    HENT:   Head: Normocephalic and atraumatic.   Eyes: Pupils are equal, round, and reactive to light. EOM are normal.   Neck: Normal range of motion. Neck supple.   Cardiovascular: Normal rate, regular rhythm and normal heart sounds.   Pulmonary/Chest: Effort normal and breath sounds normal. No stridor. No respiratory distress. She has no wheezes.   Abdominal: Soft. Bowel sounds are normal. She exhibits no distension. There is no tenderness.   Musculoskeletal: Normal range of motion. She exhibits no edema.   Neurological: She is alert and oriented to person, place, and time.   Skin: Skin is warm and dry. Capillary refill takes less than 2 seconds.   Psychiatric: She has a normal mood and affect.      Significant Labs:   BMP:   Recent Labs   Lab 01/12/20  0408   *      K 4.6      CO2 24   BUN 11   CREATININE 0.8   CALCIUM 8.4*   MG 2.0     CBC:   Recent Labs   Lab 01/10/20  1331 01/11/20  0450 01/12/20  0408   WBC 5.72 5.38 9.56   HGB 8.8* 8.5* 8.0*   HCT 30.3* 28.8* 26.6*    223 213           Assessment/Plan:      * Sickle cell disease, type S beta-zero thalassemia with crisis  40 yo female with sickle cell beta thalassemia presents with bilateral leg pain R>L for 2 days consistent with her typical pain crises. Her pain was unrelieved by her home percocet. She denies any symptoms of infection although she says she was sick 1-2 weeks ago with flu like symptoms but has recovered. She is having some shortness of breath also but denies chest pain or cough. On arrival she is stable and satting well on room air. Interestingly hemoglobin appears to be at baseline and retic count normal but patient does have significant iron and folate deficiency which could cause reticulocyte count to be low. I have low suspicion for acute chest at this time and she does not appear to have infection.  -LE U/S showed no DVT  -Infectious workup: UA without evidence of infection, CXR without focal consolidation. Follow up blood cultures, respiratory infection panel, influenza test    Plan:  -IVF with 1/2NS at 125cc/hr. Caution given lower extremity edema.  -Pain control: IV dilaudid 1.5mg q4h PRN and schedule Oxycodone 10mg which she takes at home.   -Daily reticulocyte count and LDH.   -Folic acid supplementation. Consider Venopher.     Menorrhagia with regular cycle  See SAMUEL acute blood loss      Hypokalemia  3.2 on admission s/p 40 mEq x2    Plan:  -Replete as needed      Folate deficiency  Levels were 2.9 about 2 weeks ago. Patient not on supplementation at home    Plan:  -continue start folic acid 1 mg daily      Shortness of breath  Patient has a history of intermittent asthma for which she takes  symbicort inhaler daily with albuterol or duonebs as needed. She has been experiencing 4 day history of shortness of breath with exertion, improved with rest, with associated leg swelling. No orthopnea or PND. Differential includes asthma exacerbation vs HF vs anemia vs deconditioning vs PE. She has used her inhaler without relief. Her las echo was in August and showed low normal EF 50-55% and concentric LVH.     Plan:  -TTE: 60% EF. Concentric LVH. Indeterminate amount of diastolic dysfunction.   -Continue controller inhaler while inpatient  -Duonebs as needed for shortness of breath      Iron deficiency anemia due to chronic blood loss  Patient has a history of menorrhagia and most recently had a ferritin of 2. She is s/p 1 dose of feraheme on Tuesday and is supposed to receive another dose next Tuesday. She has tried oral iron supplementation in the past but remembers not being able to tolerate . Patient states that she chronically has heavy menstrual periods; however, this current cycle is much heavier than usual.   -Pelvic U/s showed enlarged uterus with normal endometrium     Plan:  -OBGYN consulted for AUB, appreciate recs  -started medroxyprogesterone x7 days to help decrease her acute bleeding.   -Trend CBC daily  -Consider Venofer while inpatient  -Outpatient f/u with OBGYN        Trigeminal neuralgia  Home medication: Carbamazepine 800 mg BID    Plan:  -Continue home Carbamazepine      Benign essential HTN  Home medication: amlodipine 10 mg qd, Coreg 25 mg BID(patient has actually been taking incorrectly 12.5 BID), and HCTZ 25 mg daily  -Patient significantly hypertensive up to 190    Plan:  -Continue amlodipine 10, HCTZ 25, and Coreg 25  -Ensure adequate pain control before giving PRN hydralazine  -Consider adding ACE/ARB for further optimization if not controlled on above regimen        VTE Risk Mitigation (From admission, onward)         Ordered     enoxaparin injection 40 mg  Daily      01/10/20 0015      Place sequential compression device  Until discontinued      01/10/20 0219     IP VTE HIGH RISK PATIENT  Once      01/10/20 0015     IP VTE HIGH RISK PATIENT  Once      01/10/20 0015              Darrel Stone MD  Department of Hospital Medicine   [facility]

## 2020-01-12 NOTE — ASSESSMENT & PLAN NOTE
40 yo female with sickle cell beta thalassemia presents with bilateral leg pain R>L for 2 days consistent with her typical pain crises. Her pain was unrelieved by her home percocet. She denies any symptoms of infection although she says she was sick 1-2 weeks ago with flu like symptoms but has recovered. She is having some shortness of breath also but denies chest pain or cough. On arrival she is stable and satting well on room air. Interestingly hemoglobin appears to be at baseline and retic count normal but patient does have significant iron and folate deficiency which could cause reticulocyte count to be low. I have low suspicion for acute chest at this time and she does not appear to have infection.  -LE U/S showed no DVT  -Infectious workup: UA without evidence of infection, CXR without focal consolidation. Follow up blood cultures, respiratory infection panel, influenza test    Plan:  -IVF with 1/2NS at 125cc/hr. Caution given lower extremity edema.  -Pain control: IV dilaudid 1.5mg q4h PRN and schedule Oxycodone 10mg which she takes at home.   -Daily reticulocyte count and LDH.   -Folic acid supplementation. Consider Venopher.

## 2020-01-12 NOTE — SUBJECTIVE & OBJECTIVE
Interval History:     NAEON. See hospital course above.     Review of Systems   Constitutional: Negative for chills and fever.   Respiratory: Negative for cough and shortness of breath.    Cardiovascular: Negative for chest pain and leg swelling.   Gastrointestinal: Negative for abdominal distention, abdominal pain, nausea and vomiting.   Musculoskeletal: Negative for arthralgias and back pain.   Neurological: Negative for weakness and headaches.     Objective:     Vital Signs (Most Recent):  Temp: 98.5 °F (36.9 °C) (01/12/20 0743)  Pulse: 80 (01/12/20 0833)  Resp: 18 (01/12/20 0833)  BP: (!) 145/63 (01/12/20 0743)  SpO2: (!) 94 % (01/12/20 0743) Vital Signs (24h Range):  Temp:  [98.2 °F (36.8 °C)-99.1 °F (37.3 °C)] 98.5 °F (36.9 °C)  Pulse:  [80-90] 80  Resp:  [16-18] 18  SpO2:  [91 %-97 %] 94 %  BP: (112-176)/(59-93) 145/63     Weight: (!) 147 kg (324 lb)  Body mass index is 59.26 kg/m².    Intake/Output Summary (Last 24 hours) at 1/12/2020 0927  Last data filed at 1/12/2020 0900  Gross per 24 hour   Intake 2835 ml   Output --   Net 2835 ml      Physical Exam   Constitutional: She is oriented to person, place, and time.   Patient is a morbidly obese BF who appears stated age, alert, and in NAD.    HENT:   Head: Normocephalic and atraumatic.   Eyes: Pupils are equal, round, and reactive to light. EOM are normal.   Neck: Normal range of motion. Neck supple.   Cardiovascular: Normal rate, regular rhythm and normal heart sounds.   Pulmonary/Chest: Effort normal and breath sounds normal. No stridor. No respiratory distress. She has no wheezes.   Abdominal: Soft. Bowel sounds are normal. She exhibits no distension. There is no tenderness.   Musculoskeletal: Normal range of motion. She exhibits no edema.   Neurological: She is alert and oriented to person, place, and time.   Skin: Skin is warm and dry. Capillary refill takes less than 2 seconds.   Psychiatric: She has a normal mood and affect.     Significant Labs:    BMP:   Recent Labs   Lab 01/12/20  0408   *      K 4.6      CO2 24   BUN 11   CREATININE 0.8   CALCIUM 8.4*   MG 2.0     CBC:   Recent Labs   Lab 01/10/20  1331 01/11/20  0450 01/12/20  0408   WBC 5.72 5.38 9.56   HGB 8.8* 8.5* 8.0*   HCT 30.3* 28.8* 26.6*    223 213

## 2020-01-13 ENCOUNTER — TELEPHONE (OUTPATIENT)
Dept: OBSTETRICS AND GYNECOLOGY | Facility: CLINIC | Age: 40
End: 2020-01-13

## 2020-01-13 LAB
ALBUMIN SERPL BCP-MCNC: 3.2 G/DL (ref 3.5–5.2)
ALP SERPL-CCNC: 88 U/L (ref 55–135)
ALT SERPL W/O P-5'-P-CCNC: 20 U/L (ref 10–44)
ANION GAP SERPL CALC-SCNC: 8 MMOL/L (ref 8–16)
ANISOCYTOSIS BLD QL SMEAR: ABNORMAL
AST SERPL-CCNC: 25 U/L (ref 10–40)
BASOPHILS # BLD AUTO: ABNORMAL K/UL (ref 0–0.2)
BASOPHILS NFR BLD: 0 % (ref 0–1.9)
BILIRUB SERPL-MCNC: 0.3 MG/DL (ref 0.1–1)
BUN SERPL-MCNC: 9 MG/DL (ref 6–20)
CALCIUM SERPL-MCNC: 8.6 MG/DL (ref 8.7–10.5)
CHLORIDE SERPL-SCNC: 103 MMOL/L (ref 95–110)
CO2 SERPL-SCNC: 28 MMOL/L (ref 23–29)
CREAT SERPL-MCNC: 0.8 MG/DL (ref 0.5–1.4)
DIFFERENTIAL METHOD: ABNORMAL
EOSINOPHIL # BLD AUTO: ABNORMAL K/UL (ref 0–0.5)
EOSINOPHIL NFR BLD: 0 % (ref 0–8)
ERYTHROCYTE [DISTWIDTH] IN BLOOD BY AUTOMATED COUNT: 22.2 % (ref 11.5–14.5)
EST. GFR  (AFRICAN AMERICAN): >60 ML/MIN/1.73 M^2
EST. GFR  (NON AFRICAN AMERICAN): >60 ML/MIN/1.73 M^2
GLUCOSE SERPL-MCNC: 85 MG/DL (ref 70–110)
HCT VFR BLD AUTO: 28.4 % (ref 37–48.5)
HGB BLD-MCNC: 8.8 G/DL (ref 12–16)
HYPOCHROMIA BLD QL SMEAR: ABNORMAL
IMM GRANULOCYTES # BLD AUTO: ABNORMAL K/UL (ref 0–0.04)
IMM GRANULOCYTES NFR BLD AUTO: ABNORMAL % (ref 0–0.5)
LYMPHOCYTES # BLD AUTO: ABNORMAL K/UL (ref 1–4.8)
LYMPHOCYTES NFR BLD: 38 % (ref 18–48)
MAGNESIUM SERPL-MCNC: 2 MG/DL (ref 1.6–2.6)
MCH RBC QN AUTO: 19.9 PG (ref 27–31)
MCHC RBC AUTO-ENTMCNC: 31 G/DL (ref 32–36)
MCV RBC AUTO: 64 FL (ref 82–98)
MONOCYTES # BLD AUTO: ABNORMAL K/UL (ref 0.3–1)
MONOCYTES NFR BLD: 4 % (ref 4–15)
MYELOCYTES NFR BLD MANUAL: 1 %
NEUTROPHILS # BLD AUTO: ABNORMAL K/UL (ref 1.8–7.7)
NEUTROPHILS NFR BLD: 57 % (ref 38–73)
NRBC BLD-RTO: 7 /100 WBC
OVALOCYTES BLD QL SMEAR: ABNORMAL
PHOSPHATE SERPL-MCNC: 3.4 MG/DL (ref 2.7–4.5)
PLATELET # BLD AUTO: 157 K/UL (ref 150–350)
PMV BLD AUTO: 9.6 FL (ref 9.2–12.9)
POIKILOCYTOSIS BLD QL SMEAR: SLIGHT
POLYCHROMASIA BLD QL SMEAR: ABNORMAL
POTASSIUM SERPL-SCNC: 3.6 MMOL/L (ref 3.5–5.1)
PROT SERPL-MCNC: 7 G/DL (ref 6–8.4)
RBC # BLD AUTO: 4.43 M/UL (ref 4–5.4)
RETICS/RBC NFR AUTO: 5.2 % (ref 0.5–2.5)
SODIUM SERPL-SCNC: 139 MMOL/L (ref 136–145)
TARGETS BLD QL SMEAR: ABNORMAL
TSH SERPL DL<=0.005 MIU/L-ACNC: 0.6 UIU/ML (ref 0.4–4)
WBC # BLD AUTO: 7.43 K/UL (ref 3.9–12.7)

## 2020-01-13 PROCEDURE — 25000003 PHARM REV CODE 250: Performed by: STUDENT IN AN ORGANIZED HEALTH CARE EDUCATION/TRAINING PROGRAM

## 2020-01-13 PROCEDURE — 84100 ASSAY OF PHOSPHORUS: CPT

## 2020-01-13 PROCEDURE — 25000003 PHARM REV CODE 250: Performed by: HOSPITALIST

## 2020-01-13 PROCEDURE — 63600175 PHARM REV CODE 636 W HCPCS: Performed by: STUDENT IN AN ORGANIZED HEALTH CARE EDUCATION/TRAINING PROGRAM

## 2020-01-13 PROCEDURE — 83735 ASSAY OF MAGNESIUM: CPT

## 2020-01-13 PROCEDURE — 94799 UNLISTED PULMONARY SVC/PX: CPT

## 2020-01-13 PROCEDURE — 85025 COMPLETE CBC W/AUTO DIFF WBC: CPT

## 2020-01-13 PROCEDURE — 99233 PR SUBSEQUENT HOSPITAL CARE,LEVL III: ICD-10-PCS | Mod: ,,, | Performed by: HOSPITALIST

## 2020-01-13 PROCEDURE — 99233 SBSQ HOSP IP/OBS HIGH 50: CPT | Mod: ,,, | Performed by: HOSPITALIST

## 2020-01-13 PROCEDURE — 80053 COMPREHEN METABOLIC PANEL: CPT

## 2020-01-13 PROCEDURE — 84443 ASSAY THYROID STIM HORMONE: CPT

## 2020-01-13 PROCEDURE — 85045 AUTOMATED RETICULOCYTE COUNT: CPT

## 2020-01-13 PROCEDURE — 20600001 HC STEP DOWN PRIVATE ROOM

## 2020-01-13 PROCEDURE — 36415 COLL VENOUS BLD VENIPUNCTURE: CPT

## 2020-01-13 PROCEDURE — 94761 N-INVAS EAR/PLS OXIMETRY MLT: CPT

## 2020-01-13 RX ORDER — HYDROMORPHONE HYDROCHLORIDE 1 MG/ML
1 INJECTION, SOLUTION INTRAMUSCULAR; INTRAVENOUS; SUBCUTANEOUS EVERY 4 HOURS PRN
Status: DISCONTINUED | OUTPATIENT
Start: 2020-01-13 | End: 2020-01-14

## 2020-01-13 RX ORDER — OXYCODONE HYDROCHLORIDE 5 MG/1
10 TABLET ORAL EVERY 4 HOURS
Status: DISCONTINUED | OUTPATIENT
Start: 2020-01-13 | End: 2020-01-14

## 2020-01-13 RX ORDER — HYDROMORPHONE HYDROCHLORIDE 1 MG/ML
2 INJECTION, SOLUTION INTRAMUSCULAR; INTRAVENOUS; SUBCUTANEOUS EVERY 6 HOURS PRN
Status: DISCONTINUED | OUTPATIENT
Start: 2020-01-13 | End: 2020-01-13

## 2020-01-13 RX ORDER — HYDROMORPHONE HYDROCHLORIDE 1 MG/ML
2 INJECTION, SOLUTION INTRAMUSCULAR; INTRAVENOUS; SUBCUTANEOUS EVERY 4 HOURS PRN
Status: COMPLETED | OUTPATIENT
Start: 2020-01-13 | End: 2020-01-14

## 2020-01-13 RX ADMIN — HYDROMORPHONE HYDROCHLORIDE 2 MG: 1 INJECTION, SOLUTION INTRAMUSCULAR; INTRAVENOUS; SUBCUTANEOUS at 05:01

## 2020-01-13 RX ADMIN — CARVEDILOL 25 MG: 25 TABLET, FILM COATED ORAL at 09:01

## 2020-01-13 RX ADMIN — GABAPENTIN 800 MG: 400 CAPSULE ORAL at 08:01

## 2020-01-13 RX ADMIN — AMLODIPINE BESYLATE 10 MG: 10 TABLET ORAL at 09:01

## 2020-01-13 RX ADMIN — OXYCODONE HYDROCHLORIDE 10 MG: 5 TABLET ORAL at 10:01

## 2020-01-13 RX ADMIN — ACETAMINOPHEN 650 MG: 325 TABLET ORAL at 09:01

## 2020-01-13 RX ADMIN — HYDROCHLOROTHIAZIDE 25 MG: 12.5 TABLET ORAL at 09:01

## 2020-01-13 RX ADMIN — MEDROXYPROGESTERONE ACETATE 20 MG: 10 TABLET ORAL at 08:01

## 2020-01-13 RX ADMIN — SENNOSIDES AND DOCUSATE SODIUM 1 TABLET: 8.6; 5 TABLET ORAL at 08:01

## 2020-01-13 RX ADMIN — SENNOSIDES AND DOCUSATE SODIUM 1 TABLET: 8.6; 5 TABLET ORAL at 09:01

## 2020-01-13 RX ADMIN — CARVEDILOL 25 MG: 25 TABLET, FILM COATED ORAL at 08:01

## 2020-01-13 RX ADMIN — CARBAMAZEPINE 400 MG: 200 TABLET ORAL at 10:01

## 2020-01-13 RX ADMIN — GABAPENTIN 800 MG: 400 CAPSULE ORAL at 09:01

## 2020-01-13 RX ADMIN — ACETAMINOPHEN 650 MG: 325 TABLET ORAL at 04:01

## 2020-01-13 RX ADMIN — HYDROMORPHONE HYDROCHLORIDE 2 MG: 1 INJECTION, SOLUTION INTRAMUSCULAR; INTRAVENOUS; SUBCUTANEOUS at 02:01

## 2020-01-13 RX ADMIN — POLYETHYLENE GLYCOL 3350 17 G: 17 POWDER, FOR SOLUTION ORAL at 09:01

## 2020-01-13 RX ADMIN — OXYCODONE HYDROCHLORIDE 15 MG: 5 TABLET ORAL at 02:01

## 2020-01-13 RX ADMIN — ACETAMINOPHEN 650 MG: 325 TABLET ORAL at 08:01

## 2020-01-13 RX ADMIN — HYDROMORPHONE HYDROCHLORIDE 1.5 MG: 1 INJECTION, SOLUTION INTRAMUSCULAR; INTRAVENOUS; SUBCUTANEOUS at 09:01

## 2020-01-13 RX ADMIN — HYDROMORPHONE HYDROCHLORIDE 1.5 MG: 1 INJECTION, SOLUTION INTRAMUSCULAR; INTRAVENOUS; SUBCUTANEOUS at 05:01

## 2020-01-13 RX ADMIN — HYDROMORPHONE HYDROCHLORIDE 2 MG: 1 INJECTION, SOLUTION INTRAMUSCULAR; INTRAVENOUS; SUBCUTANEOUS at 08:01

## 2020-01-13 RX ADMIN — FOLIC ACID 1 MG: 1 TABLET ORAL at 09:01

## 2020-01-13 RX ADMIN — ENOXAPARIN SODIUM 40 MG: 100 INJECTION SUBCUTANEOUS at 04:01

## 2020-01-13 RX ADMIN — FLUOXETINE 40 MG: 20 CAPSULE ORAL at 09:01

## 2020-01-13 RX ADMIN — MEDROXYPROGESTERONE ACETATE 20 MG: 10 TABLET ORAL at 02:01

## 2020-01-13 RX ADMIN — FLUTICASONE FUROATE AND VILANTEROL TRIFENATATE 1 PUFF: 100; 25 POWDER RESPIRATORY (INHALATION) at 09:01

## 2020-01-13 RX ADMIN — MEDROXYPROGESTERONE ACETATE 20 MG: 10 TABLET ORAL at 09:01

## 2020-01-13 RX ADMIN — CARBAMAZEPINE 400 MG: 200 TABLET ORAL at 09:01

## 2020-01-13 NOTE — PLAN OF CARE
1.5mg IV dilauded given at 0923. oxycodone 10mg given at 1158. Plan of care discussed with patient. Fall precautions in place. Discussed medications and care. Patient has no questions at this time. Will continue to monitor.

## 2020-01-13 NOTE — PROGRESS NOTES
[facility] Hospital Medicine  Progress Note    Patient Name: Nazanin Malone  MRN: [id number]  Patient Class: IP- Inpatient   Admission Date: 1/9/2020  Length of Stay: 4 days  Attending Physician: Johnny Bryant MD  Primary Care Provider: Primary Doctor [facility] Medicine Team: [facility] HOSP MED 4 Waldemar Duffy MD    Subjective:     Principal Problem:Sickle cell disease, type S beta-zero thalassemia with crisis        HPI:  Ms. Malone is a 40 yo female with past medical history of sickle cell beta thalassemia, HTN, depression, and asthma who presents with chief complaint of pain in her legs.  She says that the pain started about 2 days ago and the pain is worse in her right leg than her left. The pain is associated with swelling and is located more in the bone rather than a particular joint. She says the pain is similar to previous sickle cell crises. She has tried her home percocet  mg q4 hours without significant relief. She usually uses this as a telling sign that she needs to come to the hospital. She is also complaining of some fatigue and shortness of breath with exertion. She denies chest pain, cough, congestion, fevers, chills, nausea, vomiting, diarrhea, constipation. She is currently on her menstrual period which is usually heavy. She says that she had a cold a week or two ago which she has recovered from. 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 She has not yet received her flu vaccination.    She follows with Dr. Montgomery for her sickle cell and was most recently saw on 12/27. She has been admitted 3 times in the last year for crisis(most recently in august 2019). Triggers for her usually include illness and changes in the weather. She says she has had acute chest in the past but cannot remember the last time. She cannot remember the last time she has received a blood transfusion. It appears her baseline hemoglobin lies between 9-10. She  has tried hydroxyurea before but did not tolerate/had a rash. Last time she saw Dr. Montgomery, she had low ferritin of 2 and was recommended to be given feraheme. She got one dose this past Tuesday and is scheduled to get another dose on Tuesday. It is suspected that low ferritin 2/2 heavy menstrual bleeding and it was recommended the patient follow up with OBGYN.    In the ED, the patient is afebrile, hypertensive in 180s and satting 97% on room air. Labs are notable for normal WBC, H/H 9.9/32.8, reticulocyte count 2.0, K 3 and rest of CMP within normal limits. Her UA had 1+ protein and >100 RBCs however patient on menstrual period. CXR obtained shows borderline cardiomegaly, coarsened interstitial lung markings, but no focal consolidation. She was given dilaudid 1 mg x 2, oral potassium, and 2L NS bolus.     Overview/Hospital Course:  Patient is admitted to [facility]4 for sickle cell crisis. She was started on IVF and pain regimen includes home percocet and IV dilaudid for severe pain.  Patient also c/o of menorrhagia much more so than her normal amount (she has heavy menstrual periods usually but this is much worse than normal.)   OBGYN consulted.  Patient was started on medroxyprogesterone for 7 days to help decrease her bleeding in this current setting.  Recommend patient follow up obgyn outpatient to discuss further options for AUB control including IUD vs hysterectomy.  TTE was preformed as patient c/o of dyspnea; it showed EF of 60% with some indeterminate amount of diastolic dysfunction.  Patient's pain appears well controlled with current pain regimen. Patient's H/H continues to drop with increase in her retic count. Suspecting anemia 2/2 menstrual period which has improved with starting progesterone and folate, b12 supplementation. Her pain is improving but still present. Improves with warm packs but declined topical pain relievers. Will decrease frequency of the patient's Dilaudid and schedule PO pain  medications.     Interval History: Patient continues to c/o of moderate pain in her BL lower extremities that is similar to the pain she felt on presentation.    -conintue scheduled PO oxy at home dose.   -continue Dilaudid PRN for breakthrough    Review of Systems   Constitutional: Negative for chills and fever.   Respiratory: Negative for cough and shortness of breath.    Cardiovascular: Positive for leg swelling (and pain). Negative for chest pain.   Gastrointestinal: Negative for abdominal distention, abdominal pain, nausea and vomiting.   Musculoskeletal: Negative for arthralgias and back pain.   Neurological: Negative for weakness and headaches.     Objective:     Vital Signs (Most Recent):  Temp: 99 °F (37.2 °C) (01/13/20 0746)  Pulse: 80 (01/13/20 0920)  Resp: 18 (01/13/20 0920)  BP: (!) 160/90 (01/13/20 0746)  SpO2: 95 % (01/13/20 0746) Vital Signs (24h Range):  Temp:  [98.1 °F (36.7 °C)-99.6 °F (37.6 °C)] 99 °F (37.2 °C)  Pulse:  [75-91] 80  Resp:  [16-18] 18  SpO2:  [93 %-97 %] 95 %  BP: (133-207)/(66-90) 160/90     Weight: (!) 147 kg (324 lb)  Body mass index is 59.26 kg/m².    Intake/Output Summary (Last 24 hours) at 1/13/2020 0925  Last data filed at 1/13/2020 0500  Gross per 24 hour   Intake 1320 ml   Output --   Net 1320 ml      Physical Exam   Constitutional: She is oriented to person, place, and time.   Patient is a morbidly obese BF who appears stated age, alert, and in NAD.    HENT:   Head: Normocephalic and atraumatic.   Eyes: Pupils are equal, round, and reactive to light. EOM are normal.   Neck: Normal range of motion. Neck supple.   Cardiovascular: Normal rate, regular rhythm and normal heart sounds.   Pulmonary/Chest: Effort normal and breath sounds normal. No stridor. No respiratory distress. She has no wheezes.   Abdominal: Soft. Bowel sounds are normal. She exhibits no distension. There is no tenderness.   Musculoskeletal: Normal range of motion. She exhibits no edema.   JOSE LUIS hose present  on BL LE.  Tenderness to palpation of LE BL   Neurological: She is alert and oriented to person, place, and time.   Skin: Skin is warm and dry. Capillary refill takes less than 2 seconds.   Psychiatric: She has a normal mood and affect.       Significant Labs:   CBC:   Recent Labs   Lab 01/12/20  0408 01/12/20  1441 01/13/20  0516   WBC 9.56 10.38 7.43   HGB 8.0* 8.5* 8.8*   HCT 26.6* 27.2* 28.4*    154 157     CMP:   Recent Labs   Lab 01/12/20  0408 01/13/20  0516    139   K 4.6 3.6    103   CO2 24 28   * 85   BUN 11 9   CREATININE 0.8 0.8   CALCIUM 8.4* 8.6*   PROT 7.3 7.0   ALBUMIN 3.4* 3.2*   BILITOT 0.3 0.3   ALKPHOS 90 88   AST 37 25   ALT 24 20   ANIONGAP 9 8   EGFRNONAA >60.0 >60.0       Significant Imaging: I have reviewed and interpreted all pertinent imaging results/findings within the past 24 hours.      Assessment/Plan:      * Sickle cell disease, type S beta-zero thalassemia with crisis  40 yo female with sickle cell beta thalassemia presents with bilateral leg pain R>L for 2 days consistent with her typical pain crises. Her pain was unrelieved by her home percocet. She denies any symptoms of infection although she says she was sick 1-2 weeks ago with flu like symptoms but has recovered. She is having some shortness of breath also but denies chest pain or cough. On arrival she is stable and satting well on room air. Interestingly hemoglobin appears to be at baseline and retic count normal but patient does have significant iron and folate deficiency which could cause reticulocyte count to be low. I have low suspicion for acute chest at this time and she does not appear to have infection.  -LE U/S showed no DVT  -Infectious workup: UA without evidence of infection, CXR without focal consolidation. Follow up blood cultures, respiratory infection panel, influenza test    Plan:  -Pain control: IV dilaudid 1mg q4h PRN for moderate pain; IV dilaudid 2mg q4h for severe pain  Schedule  Oxycodone 10mg q4h (home dose)  -Daily reticulocyte count and LDH.   -Folic acid supplementation. Consider Venopher.     Menorrhagia with regular cycle  See SAMUEL acute blood loss      Hypokalemia  3.2 on admission s/p 40 mEq x2    Plan:  -Replete as needed      Folate deficiency  Levels were 2.9 about 2 weeks ago. Patient not on supplementation at home    Plan:  -continue start folic acid 1 mg daily      Shortness of breath  Patient has a history of intermittent asthma for which she takes symbicort inhaler daily with albuterol or duonebs as needed. She has been experiencing 4 day history of shortness of breath with exertion, improved with rest, with associated leg swelling. No orthopnea or PND. Differential includes asthma exacerbation vs HF vs anemia vs deconditioning vs PE. She has used her inhaler without relief. Her las echo was in August and showed low normal EF 50-55% and concentric LVH.     Plan:  -TTE: 60% EF. Concentric LVH. Indeterminate amount of diastolic dysfunction.   -Continue controller inhaler while inpatient  -Duonebs as needed for shortness of breath        Iron deficiency anemia due to chronic blood loss  Patient has a history of menorrhagia and most recently had a ferritin of 2. She is s/p 1 dose of feraheme on Tuesday and is supposed to receive another dose next Tuesday. She has tried oral iron supplementation in the past but remembers not being able to tolerate . Patient states that she chronically has heavy menstrual periods; however, this current cycle is much heavier than usual.   -Pelvic U/s showed enlarged uterus with normal endometrium     Plan:  -OBGYN consulted for AUB, appreciate recs  -continue medroxyprogesterone x7 days to help decrease her acute bleeding.   -Trend CBC daily  -Consider Venofer while inpatient  -Outpatient f/u with OBGYN        Trigeminal neuralgia  Home medication: Carbamazepine 800 mg BID    Plan:  -Continue home Carbamazepine      Benign essential HTN  Home  medication: amlodipine 10 mg qd, Coreg 25 mg BID(patient has actually been taking incorrectly 12.5 BID), and HCTZ 25 mg daily  -Patient significantly hypertensive up to 190    Plan:  -Continue amlodipine 10, HCTZ 25, and Coreg 25  -Ensure adequate pain control before giving PRN hydralazine  -Consider adding ACE/ARB for further optimization if not controlled on above regimen  -Patient continues to have fluctuating levels of BP from SBP of 200 to 130.  Likely associated with pain and pain relief with PRN medication.  Will continue to monitor.        VTE Risk Mitigation (From admission, onward)         Ordered     enoxaparin injection 40 mg  Daily      01/10/20 0015     Place sequential compression device  Until discontinued      01/10/20 0219     IP VTE HIGH RISK PATIENT  Once      01/10/20 0015     IP VTE HIGH RISK PATIENT  Once      01/10/20 0015                      Waldemar Duffy MD  Department of Hospital Medicine   [facility]

## 2020-01-13 NOTE — ASSESSMENT & PLAN NOTE
Patient has a history of menorrhagia and most recently had a ferritin of 2. She is s/p 1 dose of feraheme on Tuesday and is supposed to receive another dose next Tuesday. She has tried oral iron supplementation in the past but remembers not being able to tolerate . Patient states that she chronically has heavy menstrual periods; however, this current cycle is much heavier than usual.   -Pelvic U/s showed enlarged uterus with normal endometrium     Plan:  -OBGYN consulted for AUB, appreciate recs  -continue medroxyprogesterone x7 days to help decrease her acute bleeding.   -Trend CBC daily  -Consider Venofer while inpatient  -Outpatient f/u with OBGYN

## 2020-01-13 NOTE — ASSESSMENT & PLAN NOTE
38 yo female with sickle cell beta thalassemia presents with bilateral leg pain R>L for 2 days consistent with her typical pain crises. Her pain was unrelieved by her home percocet. She denies any symptoms of infection although she says she was sick 1-2 weeks ago with flu like symptoms but has recovered. She is having some shortness of breath also but denies chest pain or cough. On arrival she is stable and satting well on room air. Interestingly hemoglobin appears to be at baseline and retic count normal but patient does have significant iron and folate deficiency which could cause reticulocyte count to be low. I have low suspicion for acute chest at this time and she does not appear to have infection.  -LE U/S showed no DVT  -Infectious workup: UA without evidence of infection, CXR without focal consolidation. Follow up blood cultures, respiratory infection panel, influenza test    Plan:  -Pain control: IV dilaudid 1mg q4h PRN for moderate pain; IV dilaudid 2mg q4h for severe pain  Schedule Oxycodone 10mg q4h (home dose)  -Daily reticulocyte count and LDH.   -Folic acid supplementation. Consider Marilyn.

## 2020-01-13 NOTE — SUBJECTIVE & OBJECTIVE
Interval History: Patient continues to c/o of moderate pain in her BL lower extremities that is similar to the pain she felt on presentation.    -conintue scheduled PO oxy at home dose.   -continue Dilaudid PRN for breakthrough    Review of Systems   Constitutional: Negative for chills and fever.   Respiratory: Negative for cough and shortness of breath.    Cardiovascular: Positive for leg swelling (and pain). Negative for chest pain.   Gastrointestinal: Negative for abdominal distention, abdominal pain, nausea and vomiting.   Musculoskeletal: Negative for arthralgias and back pain.   Neurological: Negative for weakness and headaches.     Objective:     Vital Signs (Most Recent):  Temp: 99 °F (37.2 °C) (01/13/20 0746)  Pulse: 80 (01/13/20 0920)  Resp: 18 (01/13/20 0920)  BP: (!) 160/90 (01/13/20 0746)  SpO2: 95 % (01/13/20 0746) Vital Signs (24h Range):  Temp:  [98.1 °F (36.7 °C)-99.6 °F (37.6 °C)] 99 °F (37.2 °C)  Pulse:  [75-91] 80  Resp:  [16-18] 18  SpO2:  [93 %-97 %] 95 %  BP: (133-207)/(66-90) 160/90     Weight: (!) 147 kg (324 lb)  Body mass index is 59.26 kg/m².    Intake/Output Summary (Last 24 hours) at 1/13/2020 0925  Last data filed at 1/13/2020 0500  Gross per 24 hour   Intake 1320 ml   Output --   Net 1320 ml      Physical Exam   Constitutional: She is oriented to person, place, and time.   Patient is a morbidly obese BF who appears stated age, alert, and in NAD.    HENT:   Head: Normocephalic and atraumatic.   Eyes: Pupils are equal, round, and reactive to light. EOM are normal.   Neck: Normal range of motion. Neck supple.   Cardiovascular: Normal rate, regular rhythm and normal heart sounds.   Pulmonary/Chest: Effort normal and breath sounds normal. No stridor. No respiratory distress. She has no wheezes.   Abdominal: Soft. Bowel sounds are normal. She exhibits no distension. There is no tenderness.   Musculoskeletal: Normal range of motion. She exhibits no edema.   JOSE LUIS hose present on BL  LE.  Tenderness to palpation of LE BL   Neurological: She is alert and oriented to person, place, and time.   Skin: Skin is warm and dry. Capillary refill takes less than 2 seconds.   Psychiatric: She has a normal mood and affect.       Significant Labs:   CBC:   Recent Labs   Lab 01/12/20  0408 01/12/20  1441 01/13/20  0516   WBC 9.56 10.38 7.43   HGB 8.0* 8.5* 8.8*   HCT 26.6* 27.2* 28.4*    154 157     CMP:   Recent Labs   Lab 01/12/20  0408 01/13/20  0516    139   K 4.6 3.6    103   CO2 24 28   * 85   BUN 11 9   CREATININE 0.8 0.8   CALCIUM 8.4* 8.6*   PROT 7.3 7.0   ALBUMIN 3.4* 3.2*   BILITOT 0.3 0.3   ALKPHOS 90 88   AST 37 25   ALT 24 20   ANIONGAP 9 8   EGFRNONAA >60.0 >60.0       Significant Imaging: I have reviewed and interpreted all pertinent imaging results/findings within the past 24 hours.

## 2020-01-13 NOTE — TELEPHONE ENCOUNTER
----- Message from Waldemar Kapoor MD sent at 1/10/2020  5:11 PM CST -----  Please call patient and schedule appointment to discuss AUB. Pt seen in hospital. Has been seen by Dr. Santos in past and prefers to follow up with her.  Thanks!

## 2020-01-13 NOTE — ASSESSMENT & PLAN NOTE
Home medication: amlodipine 10 mg qd, Coreg 25 mg BID(patient has actually been taking incorrectly 12.5 BID), and HCTZ 25 mg daily  -Patient significantly hypertensive up to 190    Plan:  -Continue amlodipine 10, HCTZ 25, and Coreg 25  -Ensure adequate pain control before giving PRN hydralazine  -Consider adding ACE/ARB for further optimization if not controlled on above regimen  -Patient continues to have fluctuating levels of BP from SBP of 200 to 130.  Likely associated with pain and pain relief with PRN medication.  Will continue to monitor.

## 2020-01-14 ENCOUNTER — TELEPHONE (OUTPATIENT)
Dept: HEMATOLOGY/ONCOLOGY | Facility: CLINIC | Age: 40
End: 2020-01-14

## 2020-01-14 PROBLEM — D57.00 SICKLE CELL PAIN CRISIS: Status: ACTIVE | Noted: 2020-01-14

## 2020-01-14 PROBLEM — E87.6 HYPOKALEMIA: Status: RESOLVED | Noted: 2020-01-10 | Resolved: 2020-01-14

## 2020-01-14 PROBLEM — E66.813 CLASS 3 SEVERE OBESITY DUE TO EXCESS CALORIES WITHOUT SERIOUS COMORBIDITY WITH BODY MASS INDEX (BMI) OF 50.0 TO 59.9 IN ADULT: Status: ACTIVE | Noted: 2017-04-25

## 2020-01-14 LAB
ALBUMIN SERPL BCP-MCNC: 3.2 G/DL (ref 3.5–5.2)
ALP SERPL-CCNC: 79 U/L (ref 55–135)
ALT SERPL W/O P-5'-P-CCNC: 17 U/L (ref 10–44)
ANION GAP SERPL CALC-SCNC: 10 MMOL/L (ref 8–16)
AST SERPL-CCNC: 22 U/L (ref 10–40)
BASOPHILS # BLD AUTO: 0.03 K/UL (ref 0–0.2)
BASOPHILS # BLD AUTO: 0.03 K/UL (ref 0–0.2)
BASOPHILS NFR BLD: 0.4 % (ref 0–1.9)
BASOPHILS NFR BLD: 0.5 % (ref 0–1.9)
BILIRUB SERPL-MCNC: 0.3 MG/DL (ref 0.1–1)
BUN SERPL-MCNC: 9 MG/DL (ref 6–20)
CALCIUM SERPL-MCNC: 8.7 MG/DL (ref 8.7–10.5)
CHLORIDE SERPL-SCNC: 104 MMOL/L (ref 95–110)
CO2 SERPL-SCNC: 26 MMOL/L (ref 23–29)
CREAT SERPL-MCNC: 0.7 MG/DL (ref 0.5–1.4)
DIFFERENTIAL METHOD: ABNORMAL
DIFFERENTIAL METHOD: ABNORMAL
EOSINOPHIL # BLD AUTO: 0 K/UL (ref 0–0.5)
EOSINOPHIL # BLD AUTO: 0 K/UL (ref 0–0.5)
EOSINOPHIL NFR BLD: 0 % (ref 0–8)
EOSINOPHIL NFR BLD: 0 % (ref 0–8)
ERYTHROCYTE [DISTWIDTH] IN BLOOD BY AUTOMATED COUNT: 23 % (ref 11.5–14.5)
ERYTHROCYTE [DISTWIDTH] IN BLOOD BY AUTOMATED COUNT: 23.3 % (ref 11.5–14.5)
EST. GFR  (AFRICAN AMERICAN): >60 ML/MIN/1.73 M^2
EST. GFR  (NON AFRICAN AMERICAN): >60 ML/MIN/1.73 M^2
GLUCOSE SERPL-MCNC: 96 MG/DL (ref 70–110)
HCT VFR BLD AUTO: 29.3 % (ref 37–48.5)
HCT VFR BLD AUTO: 30.9 % (ref 37–48.5)
HGB BLD-MCNC: 8.7 G/DL (ref 12–16)
HGB BLD-MCNC: 9.1 G/DL (ref 12–16)
IMM GRANULOCYTES # BLD AUTO: 0.04 K/UL (ref 0–0.04)
IMM GRANULOCYTES # BLD AUTO: 0.04 K/UL (ref 0–0.04)
IMM GRANULOCYTES NFR BLD AUTO: 0.6 % (ref 0–0.5)
IMM GRANULOCYTES NFR BLD AUTO: 0.6 % (ref 0–0.5)
LYMPHOCYTES # BLD AUTO: 3.5 K/UL (ref 1–4.8)
LYMPHOCYTES # BLD AUTO: 3.5 K/UL (ref 1–4.8)
LYMPHOCYTES NFR BLD: 52 % (ref 18–48)
LYMPHOCYTES NFR BLD: 53.2 % (ref 18–48)
MAGNESIUM SERPL-MCNC: 1.8 MG/DL (ref 1.6–2.6)
MCH RBC QN AUTO: 19.6 PG (ref 27–31)
MCH RBC QN AUTO: 19.7 PG (ref 27–31)
MCHC RBC AUTO-ENTMCNC: 29.4 G/DL (ref 32–36)
MCHC RBC AUTO-ENTMCNC: 29.7 G/DL (ref 32–36)
MCV RBC AUTO: 66 FL (ref 82–98)
MCV RBC AUTO: 67 FL (ref 82–98)
MONOCYTES # BLD AUTO: 0.8 K/UL (ref 0.3–1)
MONOCYTES # BLD AUTO: 0.9 K/UL (ref 0.3–1)
MONOCYTES NFR BLD: 11.7 % (ref 4–15)
MONOCYTES NFR BLD: 12.9 % (ref 4–15)
NEUTROPHILS # BLD AUTO: 2.2 K/UL (ref 1.8–7.7)
NEUTROPHILS # BLD AUTO: 2.4 K/UL (ref 1.8–7.7)
NEUTROPHILS NFR BLD: 32.8 % (ref 38–73)
NEUTROPHILS NFR BLD: 35.3 % (ref 38–73)
NRBC BLD-RTO: 10 /100 WBC
NRBC BLD-RTO: 9 /100 WBC
PATH REV BLD -IMP: NORMAL
PHOSPHATE SERPL-MCNC: 3.2 MG/DL (ref 2.7–4.5)
PLATELET # BLD AUTO: 157 K/UL (ref 150–350)
PLATELET # BLD AUTO: 168 K/UL (ref 150–350)
PMV BLD AUTO: 10.1 FL (ref 9.2–12.9)
PMV BLD AUTO: 9.1 FL (ref 9.2–12.9)
POTASSIUM SERPL-SCNC: 4 MMOL/L (ref 3.5–5.1)
PROT SERPL-MCNC: 6.9 G/DL (ref 6–8.4)
RBC # BLD AUTO: 4.42 M/UL (ref 4–5.4)
RBC # BLD AUTO: 4.64 M/UL (ref 4–5.4)
RETICS/RBC NFR AUTO: 5.4 % (ref 0.5–2.5)
SODIUM SERPL-SCNC: 140 MMOL/L (ref 136–145)
WBC # BLD AUTO: 6.6 K/UL (ref 3.9–12.7)
WBC # BLD AUTO: 6.73 K/UL (ref 3.9–12.7)

## 2020-01-14 PROCEDURE — 85025 COMPLETE CBC W/AUTO DIFF WBC: CPT

## 2020-01-14 PROCEDURE — 11000001 HC ACUTE MED/SURG PRIVATE ROOM

## 2020-01-14 PROCEDURE — 25000003 PHARM REV CODE 250: Performed by: STUDENT IN AN ORGANIZED HEALTH CARE EDUCATION/TRAINING PROGRAM

## 2020-01-14 PROCEDURE — 84100 ASSAY OF PHOSPHORUS: CPT

## 2020-01-14 PROCEDURE — 99233 PR SUBSEQUENT HOSPITAL CARE,LEVL III: ICD-10-PCS | Mod: ,,, | Performed by: HOSPITALIST

## 2020-01-14 PROCEDURE — 36415 COLL VENOUS BLD VENIPUNCTURE: CPT

## 2020-01-14 PROCEDURE — 85045 AUTOMATED RETICULOCYTE COUNT: CPT

## 2020-01-14 PROCEDURE — 83735 ASSAY OF MAGNESIUM: CPT

## 2020-01-14 PROCEDURE — 80053 COMPREHEN METABOLIC PANEL: CPT

## 2020-01-14 PROCEDURE — 63600175 PHARM REV CODE 636 W HCPCS: Performed by: HOSPITALIST

## 2020-01-14 PROCEDURE — 25000003 PHARM REV CODE 250: Performed by: HOSPITALIST

## 2020-01-14 PROCEDURE — 85060 BLOOD SMEAR INTERPRETATION: CPT | Mod: ,,, | Performed by: PATHOLOGY

## 2020-01-14 PROCEDURE — 83020 HEMOGLOBIN ELECTROPHORESIS: CPT

## 2020-01-14 PROCEDURE — 63600175 PHARM REV CODE 636 W HCPCS: Performed by: STUDENT IN AN ORGANIZED HEALTH CARE EDUCATION/TRAINING PROGRAM

## 2020-01-14 PROCEDURE — 99233 SBSQ HOSP IP/OBS HIGH 50: CPT | Mod: ,,, | Performed by: HOSPITALIST

## 2020-01-14 PROCEDURE — 85060 PATHOLOGIST REVIEW: ICD-10-PCS | Mod: ,,, | Performed by: PATHOLOGY

## 2020-01-14 RX ORDER — TALC
6 POWDER (GRAM) TOPICAL NIGHTLY PRN
Status: DISCONTINUED | OUTPATIENT
Start: 2020-01-14 | End: 2020-01-16

## 2020-01-14 RX ORDER — ENOXAPARIN SODIUM 100 MG/ML
40 INJECTION SUBCUTANEOUS EVERY 12 HOURS
Status: DISCONTINUED | OUTPATIENT
Start: 2020-01-14 | End: 2020-01-19 | Stop reason: HOSPADM

## 2020-01-14 RX ORDER — GABAPENTIN 400 MG/1
800 CAPSULE ORAL 3 TIMES DAILY
Status: DISCONTINUED | OUTPATIENT
Start: 2020-01-14 | End: 2020-01-19 | Stop reason: HOSPADM

## 2020-01-14 RX ORDER — AMOXICILLIN 250 MG
2 CAPSULE ORAL 2 TIMES DAILY
Status: DISCONTINUED | OUTPATIENT
Start: 2020-01-14 | End: 2020-01-19 | Stop reason: HOSPADM

## 2020-01-14 RX ORDER — ACETAMINOPHEN 500 MG
1000 TABLET ORAL
Status: DISCONTINUED | OUTPATIENT
Start: 2020-01-14 | End: 2020-01-19 | Stop reason: HOSPADM

## 2020-01-14 RX ORDER — OXYCODONE HCL 10 MG/1
20 TABLET, FILM COATED, EXTENDED RELEASE ORAL EVERY 12 HOURS
Status: DISCONTINUED | OUTPATIENT
Start: 2020-01-14 | End: 2020-01-16

## 2020-01-14 RX ORDER — HYDROMORPHONE HCL IN 0.9% NACL 6 MG/30 ML
PATIENT CONTROLLED ANALGESIA SYRINGE INTRAVENOUS CONTINUOUS
Status: DISCONTINUED | OUTPATIENT
Start: 2020-01-14 | End: 2020-01-17

## 2020-01-14 RX ORDER — SODIUM CHLORIDE 450 MG/100ML
INJECTION, SOLUTION INTRAVENOUS CONTINUOUS
Status: DISCONTINUED | OUTPATIENT
Start: 2020-01-14 | End: 2020-01-17

## 2020-01-14 RX ORDER — DIPHENHYDRAMINE HCL 25 MG
25 CAPSULE ORAL EVERY 4 HOURS PRN
Status: DISCONTINUED | OUTPATIENT
Start: 2020-01-14 | End: 2020-01-19 | Stop reason: HOSPADM

## 2020-01-14 RX ADMIN — ACETAMINOPHEN 1000 MG: 500 TABLET ORAL at 03:01

## 2020-01-14 RX ADMIN — SENNOSIDES AND DOCUSATE SODIUM 2 TABLET: 8.6; 5 TABLET ORAL at 09:01

## 2020-01-14 RX ADMIN — MEDROXYPROGESTERONE ACETATE 20 MG: 10 TABLET ORAL at 08:01

## 2020-01-14 RX ADMIN — CARVEDILOL 25 MG: 25 TABLET, FILM COATED ORAL at 09:01

## 2020-01-14 RX ADMIN — ENOXAPARIN SODIUM 40 MG: 100 INJECTION SUBCUTANEOUS at 11:01

## 2020-01-14 RX ADMIN — HYDROMORPHONE HYDROCHLORIDE 2 MG: 1 INJECTION, SOLUTION INTRAMUSCULAR; INTRAVENOUS; SUBCUTANEOUS at 01:01

## 2020-01-14 RX ADMIN — OXYCODONE HYDROCHLORIDE 20 MG: 20 TABLET, FILM COATED, EXTENDED RELEASE ORAL at 11:01

## 2020-01-14 RX ADMIN — Medication: at 02:01

## 2020-01-14 RX ADMIN — OXYCODONE HYDROCHLORIDE 10 MG: 5 TABLET ORAL at 02:01

## 2020-01-14 RX ADMIN — OXYCODONE HYDROCHLORIDE 20 MG: 20 TABLET, FILM COATED, EXTENDED RELEASE ORAL at 09:01

## 2020-01-14 RX ADMIN — FLUOXETINE 40 MG: 20 CAPSULE ORAL at 08:01

## 2020-01-14 RX ADMIN — GABAPENTIN 800 MG: 400 CAPSULE ORAL at 08:01

## 2020-01-14 RX ADMIN — ACETAMINOPHEN 650 MG: 325 TABLET ORAL at 08:01

## 2020-01-14 RX ADMIN — CARBAMAZEPINE 400 MG: 200 TABLET ORAL at 09:01

## 2020-01-14 RX ADMIN — ACETAMINOPHEN 1000 MG: 500 TABLET ORAL at 09:01

## 2020-01-14 RX ADMIN — GABAPENTIN 800 MG: 400 CAPSULE ORAL at 09:01

## 2020-01-14 RX ADMIN — SENNOSIDES AND DOCUSATE SODIUM 2 TABLET: 8.6; 5 TABLET ORAL at 01:01

## 2020-01-14 RX ADMIN — Medication: at 08:01

## 2020-01-14 RX ADMIN — MEDROXYPROGESTERONE ACETATE 20 MG: 10 TABLET ORAL at 09:01

## 2020-01-14 RX ADMIN — SENNOSIDES AND DOCUSATE SODIUM 1 TABLET: 8.6; 5 TABLET ORAL at 08:01

## 2020-01-14 RX ADMIN — ACETAMINOPHEN 650 MG: 325 TABLET ORAL at 02:01

## 2020-01-14 RX ADMIN — MEDROXYPROGESTERONE ACETATE 20 MG: 10 TABLET ORAL at 03:01

## 2020-01-14 RX ADMIN — DIPHENHYDRAMINE HYDROCHLORIDE 25 MG: 25 CAPSULE ORAL at 03:01

## 2020-01-14 RX ADMIN — AMLODIPINE BESYLATE 10 MG: 10 TABLET ORAL at 08:01

## 2020-01-14 RX ADMIN — CARVEDILOL 25 MG: 25 TABLET, FILM COATED ORAL at 08:01

## 2020-01-14 RX ADMIN — SODIUM CHLORIDE: 0.45 INJECTION, SOLUTION INTRAVENOUS at 01:01

## 2020-01-14 RX ADMIN — CARBAMAZEPINE 400 MG: 200 TABLET ORAL at 08:01

## 2020-01-14 RX ADMIN — FOLIC ACID 1 MG: 1 TABLET ORAL at 08:01

## 2020-01-14 RX ADMIN — POLYETHYLENE GLYCOL 3350 17 G: 17 POWDER, FOR SOLUTION ORAL at 08:01

## 2020-01-14 RX ADMIN — OXYCODONE HYDROCHLORIDE 10 MG: 5 TABLET ORAL at 06:01

## 2020-01-14 RX ADMIN — HYDROMORPHONE HYDROCHLORIDE 2 MG: 1 INJECTION, SOLUTION INTRAMUSCULAR; INTRAVENOUS; SUBCUTANEOUS at 03:01

## 2020-01-14 RX ADMIN — FLUTICASONE FUROATE AND VILANTEROL TRIFENATATE 1 PUFF: 100; 25 POWDER RESPIRATORY (INHALATION) at 08:01

## 2020-01-14 RX ADMIN — HYDROMORPHONE HYDROCHLORIDE 2 MG: 1 INJECTION, SOLUTION INTRAMUSCULAR; INTRAVENOUS; SUBCUTANEOUS at 08:01

## 2020-01-14 RX ADMIN — ENOXAPARIN SODIUM 40 MG: 100 INJECTION SUBCUTANEOUS at 09:01

## 2020-01-14 RX ADMIN — GABAPENTIN 800 MG: 400 CAPSULE ORAL at 03:01

## 2020-01-14 RX ADMIN — HYDROCHLOROTHIAZIDE 25 MG: 12.5 TABLET ORAL at 08:01

## 2020-01-14 NOTE — PROGRESS NOTES
[facility] Hospital Medicine  Progress Note    Patient Name: Nazanin Malone  MRN: [id number]  Patient Class: IP- Inpatient   Admission Date: 1/9/2020  Length of Stay: 5 days  Attending Physician: Johnny Bryant MD  Primary Care Provider: Primary Doctor [facility] Hospital Medicine Team: [facility] 4 Waldemar Duffy MD    Subjective:     Principal Problem:Sickle cell disease, type S beta-zero thalassemia with crisis        HPI:  Ms. Malone is a 40 yo female with past medical history of sickle cell beta thalassemia, HTN, depression, and asthma who presents with chief complaint of pain in her legs.  She says that the pain started about 2 days ago and the pain is worse in her right leg than her left. The pain is associated with swelling and is located more in the bone rather than a particular joint. She says the pain is similar to previous sickle cell crises. She has tried her home percocet  mg q4 hours without significant relief. She usually uses this as a telling sign that she needs to come to the hospital. She is also complaining of some fatigue and shortness of breath with exertion. She denies chest pain, cough, congestion, fevers, chills, nausea, vomiting, diarrhea, constipation. She is currently on her menstrual period which is usually heavy. She says that she had a cold a week or two ago which she has recovered from. 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 She has not yet received her flu vaccination.    She follows with Dr. Montgomery for her sickle cell and was most recently saw on 12/27. She has been admitted 3 times in the last year for crisis(most recently in august 2019). Triggers for her usually include illness and changes in the weather. She says she has had acute chest in the past but cannot remember the last time. She cannot remember the last time she has received a blood transfusion. It appears her baseline hemoglobin lies between 9-10. She  has tried hydroxyurea before but did not tolerate/had a rash. Last time she saw Dr. Montgomery, she had low ferritin of 2 and was recommended to be given feraheme. She got one dose this past Tuesday and is scheduled to get another dose on Tuesday. It is suspected that low ferritin 2/2 heavy menstrual bleeding and it was recommended the patient follow up with OBGYN.    In the ED, the patient is afebrile, hypertensive in 180s and satting 97% on room air. Labs are notable for normal WBC, H/H 9.9/32.8, reticulocyte count 2.0, K 3 and rest of CMP within normal limits. Her UA had 1+ protein and >100 RBCs however patient on menstrual period. CXR obtained shows borderline cardiomegaly, coarsened interstitial lung markings, but no focal consolidation. She was given dilaudid 1 mg x 2, oral potassium, and 2L NS bolus.     Overview/Hospital Course:  Patient is admitted to IM4 for sickle cell crisis. She was started on IVF and pain regimen includes home percocet and IV dilaudid for severe pain.  Patient also c/o of menorrhagia much more so than her normal amount (she has heavy menstrual periods usually but this is much worse than normal.)   OBGYN consulted.  Patient was started on medroxyprogesterone for 7 days to help decrease her bleeding in this current setting.  Recommend patient follow up obgyn outpatient to discuss further options for AUB control including IUD vs hysterectomy.  TTE was preformed as patient c/o of dyspnea; it showed EF of 60% with some indeterminate amount of diastolic dysfunction.  Patient's pain appears well controlled with current pain regimen. Patient's H/H continues to drop with increase in her retic count. Suspecting anemia 2/2 menstrual period which has improved with starting progesterone and folate, b12 supplementation. Her pain is improving but still present. Improves with warm packs but declined topical pain relievers. Will decrease frequency of the patient's Dilaudid and schedule PO pain  medications. Patient continuing to have worsening pain similar to her presentation in the hospital.  Maximized multi-modal pain options by increasing tylenol and gabapentin dosage/ frequency as well as changing patient's oxycodone IR to longer acting.      Interval History: NAEON.  Patient continues to require frequent IV PRN pain medication for breakthrough pain. Patient's pain continues to be the worst around her BL LE.  She does not feel back to her baseline yet.     Review of Systems   Constitutional: Negative for chills and fever.   Respiratory: Negative for cough and shortness of breath.    Cardiovascular: Positive for leg swelling (and pain). Negative for chest pain.   Gastrointestinal: Negative for abdominal distention, abdominal pain, nausea and vomiting.   Musculoskeletal: Negative for arthralgias and back pain.   Neurological: Negative for weakness and headaches.     Objective:     Vital Signs (Most Recent):  Temp: 98.5 °F (36.9 °C) (01/14/20 0452)  Pulse: 85 (01/14/20 0452)  Resp: 16 (01/14/20 0452)  BP: (!) 140/90 (01/14/20 0603)  SpO2: (!) 94 % (01/14/20 0452) Vital Signs (24h Range):  Temp:  [98.4 °F (36.9 °C)-99 °F (37.2 °C)] 98.5 °F (36.9 °C)  Pulse:  [76-85] 85  Resp:  [16-20] 16  SpO2:  [94 %-97 %] 94 %  BP: (114-182)/(66-93) 140/90     Weight: (!) 147 kg (324 lb)  Body mass index is 59.26 kg/m².    Intake/Output Summary (Last 24 hours) at 1/14/2020 0623  Last data filed at 1/14/2020 0500  Gross per 24 hour   Intake 1320 ml   Output 0 ml   Net 1320 ml      Physical Exam   Constitutional: She is oriented to person, place, and time.   Patient is a morbidly obese BF who appears stated age, alert, and in NAD.    HENT:   Head: Normocephalic and atraumatic.   Eyes: Pupils are equal, round, and reactive to light. EOM are normal.   Neck: Normal range of motion. Neck supple.   Cardiovascular: Normal rate, regular rhythm and normal heart sounds.   Pulmonary/Chest: Effort normal and breath sounds normal. No  stridor. No respiratory distress. She has no wheezes.   Abdominal: Soft. Bowel sounds are normal. She exhibits no distension. There is no tenderness.   Musculoskeletal: Normal range of motion. She exhibits no edema.   JOSE LUIS hose present on BL LE.  Tenderness to palpation of LE BL   Neurological: She is alert and oriented to person, place, and time.   Skin: Skin is warm and dry. Capillary refill takes less than 2 seconds.   Psychiatric: She has a normal mood and affect.       Significant Labs:   CBC:   Recent Labs   Lab 01/12/20  1441 01/13/20  0516 01/14/20  0336   WBC 10.38 7.43 6.73   HGB 8.5* 8.8* 8.7*   HCT 27.2* 28.4* 29.3*    157 168     CMP:   Recent Labs   Lab 01/13/20  0516 01/14/20  0335    140   K 3.6 4.0    104   CO2 28 26   GLU 85 96   BUN 9 9   CREATININE 0.8 0.7   CALCIUM 8.6* 8.7   PROT 7.0 6.9   ALBUMIN 3.2* 3.2*   BILITOT 0.3 0.3   ALKPHOS 88 79   AST 25 22   ALT 20 17   ANIONGAP 8 10   EGFRNONAA >60.0 >60.0       Significant Imaging: I have reviewed and interpreted all pertinent imaging results/findings within the past 24 hours.      Assessment/Plan:      * Sickle cell disease, type S beta-zero thalassemia with crisis  40 yo female with sickle cell beta thalassemia presents with bilateral leg pain R>L for 2 days consistent with her typical pain crises. Her pain was unrelieved by her home percocet. She denies any symptoms of infection although she says she was sick 1-2 weeks ago with flu like symptoms but has recovered. She is having some shortness of breath also but denies chest pain or cough. On arrival she is stable and satting well on room air. Interestingly hemoglobin appears to be at baseline and retic count normal but patient does have significant iron and folate deficiency which could cause reticulocyte count to be low. I have low suspicion for acute chest at this time and she does not appear to have infection.  -LE U/S showed no DVT  -Infectious workup: UA without evidence  of infection, CXR without focal consolidation. Follow up blood cultures, respiratory infection panel, influenza test    Plan:  -Pain control: IV dilaudid 1mg q4h PRN for moderate pain; IV dilaudid 2mg q4h for severe pain  Changed scheduled Oxycodone to long acting  20mg q12h   -Increased acetaminophen to 1g q6h scheduled  -Increased gabapentin to 800mg TID  -Daily reticulocyte count    -Folic acid supplementation. Consider Venopher.   -f/u PT/OT    Menorrhagia with regular cycle  See SAMUEL acute blood loss      Folate deficiency  Levels were 2.9 about 2 weeks ago. Patient not on supplementation at home    Plan:  -continue start folic acid 1 mg daily      Shortness of breath  Patient has a history of intermittent asthma for which she takes symbicort inhaler daily with albuterol or duonebs as needed. She has been experiencing 4 day history of shortness of breath with exertion, improved with rest, with associated leg swelling. No orthopnea or PND. Differential includes asthma exacerbation vs HF vs anemia vs deconditioning vs PE. She has used her inhaler without relief. Her las echo was in August and showed low normal EF 50-55% and concentric LVH.   -TTE: 60% EF. Concentric LVH. Indeterminate amount of diastolic dysfunction.    Plan:   -Continue controller inhaler while inpatient  -Duonebs as needed for shortness of breath        Iron deficiency anemia due to chronic blood loss  Patient has a history of menorrhagia and most recently had a ferritin of 2. She is s/p 1 dose of feraheme on Tuesday and is supposed to receive another dose next Tuesday. She has tried oral iron supplementation in the past but remembers not being able to tolerate . Patient states that she chronically has heavy menstrual periods; however, this current cycle is much heavier than usual.   -Pelvic U/s showed enlarged uterus with normal endometrium     Plan:  -OBGYN consulted for AUB, appreciate recs  -continue medroxyprogesterone x7 days to help  decrease her acute bleeding.   -Trend CBC daily  -Consider Venofer while inpatient  -Outpatient f/u with OBGYN        Trigeminal neuralgia  Home medication: Carbamazepine 800 mg BID    Plan:  -Continue home Carbamazepine      Essential hypertension  Home medication: amlodipine 10 mg qd, Coreg 25 mg BID(patient has actually been taking incorrectly 12.5 BID), and HCTZ 25 mg daily  -Patient significantly hypertensive up to 190    Plan:  -Continue amlodipine 10, HCTZ 25, and Coreg 25  -Ensure adequate pain control before giving PRN hydralazine  -Consider adding ACE/ARB for further optimization if not controlled on above regimen  -Patient continues to have fluctuating levels of BP from SBP of 200 to 130.  Likely associated with pain and pain relief with PRN medication.  Will continue to monitor.        VTE Risk Mitigation (From admission, onward)         Ordered     enoxaparin injection 40 mg  Every 12 hours      01/14/20 1047     Place sequential compression device  Until discontinued      01/10/20 0219     IP VTE HIGH RISK PATIENT  Once      01/10/20 0015                      Waldemar Duffy MD  Department of Hospital Medicine   [facility]

## 2020-01-14 NOTE — ASSESSMENT & PLAN NOTE
Patient has a history of intermittent asthma for which she takes symbicort inhaler daily with albuterol or duonebs as needed. She has been experiencing 4 day history of shortness of breath with exertion, improved with rest, with associated leg swelling. No orthopnea or PND. Differential includes asthma exacerbation vs HF vs anemia vs deconditioning vs PE. She has used her inhaler without relief. Her las echo was in August and showed low normal EF 50-55% and concentric LVH.   -TTE: 60% EF. Concentric LVH. Indeterminate amount of diastolic dysfunction.    Plan:   -Continue controller inhaler while inpatient  -Duonebs as needed for shortness of breath

## 2020-01-14 NOTE — TELEPHONE ENCOUNTER
----- Message from Alton Cardenas RN sent at 1/14/2020 11:54 AM CST -----  This pt is on the schedule today for Dor[facility].  I just spoke to her and she is in the hospital room 303 for sickle cell crisis.      I am cancelling her appt today.  She would like someone to call her to reschedule her iron appt.    Thank you,  Alton Cardenas RN, BSN  RN , [facility]fusion  [facility]  [phone]

## 2020-01-14 NOTE — ASSESSMENT & PLAN NOTE
38 yo female with sickle cell beta thalassemia presents with bilateral leg pain R>L for 2 days consistent with her typical pain crises. Her pain was unrelieved by her home percocet. She denies any symptoms of infection although she says she was sick 1-2 weeks ago with flu like symptoms but has recovered. She is having some shortness of breath also but denies chest pain or cough. On arrival she is stable and satting well on room air. Interestingly hemoglobin appears to be at baseline and retic count normal but patient does have significant iron and folate deficiency which could cause reticulocyte count to be low. I have low suspicion for acute chest at this time and she does not appear to have infection.  -LE U/S showed no DVT  -Infectious workup: UA without evidence of infection, CXR without focal consolidation. Follow up blood cultures, respiratory infection panel, influenza test    Plan:  -Pain control: IV dilaudid 1mg q4h PRN for moderate pain; IV dilaudid 2mg q4h for severe pain  Changed scheduled Oxycodone to long acting  20mg q12h   -Increased acetaminophen to 1g q6h scheduled  -Increased gabapentin to 800mg TID  -Daily reticulocyte count    -Folic acid supplementation. Consider Marilyn.

## 2020-01-14 NOTE — PT/OT/SLP PROGRESS
Occupational Therapy      Patient Name:  Nazanin Malone   MRN:  [id number]    OT orders received and patient's chart reviewed. OT attempted to complete evaluation but patient refused 2* pain and requested therapist return tomorrow. Will follow-up up 1/15/2020.    Sussy Cox OT  1/14/2020

## 2020-01-14 NOTE — PLAN OF CARE
01/14/20 1534   Discharge Reassessment   Assessment Type Discharge Planning Reassessment   Provided patient/caregiver education on the expected discharge date and the discharge plan Yes   Do you have any problems affording any of your prescribed medications? No   Discharge Plan A Home with family   Discharge Plan B Home with family   DME Needed Upon Discharge  none   Anticipated Discharge Disposition Home     CM to bedside for d/c planning assessment. Daughter at bedside. Pt has no questions at this time. Anticipate home with no needs.    Julie Haase RN  Case Management [phone]

## 2020-01-14 NOTE — SUBJECTIVE & OBJECTIVE
Interval History: NAEON.  Patient continues to require frequent IV PRN pain medication for breakthrough pain. Patient's pain continues to be the worst around her BL LE.  She does not feel back to her baseline yet.     Review of Systems   Constitutional: Negative for chills and fever.   Respiratory: Negative for cough and shortness of breath.    Cardiovascular: Positive for leg swelling (and pain). Negative for chest pain.   Gastrointestinal: Negative for abdominal distention, abdominal pain, nausea and vomiting.   Musculoskeletal: Negative for arthralgias and back pain.   Neurological: Negative for weakness and headaches.     Objective:     Vital Signs (Most Recent):  Temp: 98.5 °F (36.9 °C) (01/14/20 0452)  Pulse: 85 (01/14/20 0452)  Resp: 16 (01/14/20 0452)  BP: (!) 140/90 (01/14/20 0603)  SpO2: (!) 94 % (01/14/20 0452) Vital Signs (24h Range):  Temp:  [98.4 °F (36.9 °C)-99 °F (37.2 °C)] 98.5 °F (36.9 °C)  Pulse:  [76-85] 85  Resp:  [16-20] 16  SpO2:  [94 %-97 %] 94 %  BP: (114-182)/(66-93) 140/90     Weight: (!) 147 kg (324 lb)  Body mass index is 59.26 kg/m².    Intake/Output Summary (Last 24 hours) at 1/14/2020 0623  Last data filed at 1/14/2020 0500  Gross per 24 hour   Intake 1320 ml   Output 0 ml   Net 1320 ml      Physical Exam   Constitutional: She is oriented to person, place, and time.   Patient is a morbidly obese BF who appears stated age, alert, and in NAD.    HENT:   Head: Normocephalic and atraumatic.   Eyes: Pupils are equal, round, and reactive to light. EOM are normal.   Neck: Normal range of motion. Neck supple.   Cardiovascular: Normal rate, regular rhythm and normal heart sounds.   Pulmonary/Chest: Effort normal and breath sounds normal. No stridor. No respiratory distress. She has no wheezes.   Abdominal: Soft. Bowel sounds are normal. She exhibits no distension. There is no tenderness.   Musculoskeletal: Normal range of motion. She exhibits no edema.   JOSE LUIS hose present on BL LE.  Tenderness  to palpation of LE BL   Neurological: She is alert and oriented to person, place, and time.   Skin: Skin is warm and dry. Capillary refill takes less than 2 seconds.   Psychiatric: She has a normal mood and affect.       Significant Labs:   CBC:   Recent Labs   Lab 01/12/20  1441 01/13/20  0516 01/14/20  0336   WBC 10.38 7.43 6.73   HGB 8.5* 8.8* 8.7*   HCT 27.2* 28.4* 29.3*    157 168     CMP:   Recent Labs   Lab 01/13/20  0516 01/14/20  0335    140   K 3.6 4.0    104   CO2 28 26   GLU 85 96   BUN 9 9   CREATININE 0.8 0.7   CALCIUM 8.6* 8.7   PROT 7.0 6.9   ALBUMIN 3.2* 3.2*   BILITOT 0.3 0.3   ALKPHOS 88 79   AST 25 22   ALT 20 17   ANIONGAP 8 10   EGFRNONAA >60.0 >60.0       Significant Imaging: I have reviewed and interpreted all pertinent imaging results/findings within the past 24 hours.

## 2020-01-14 NOTE — PLAN OF CARE
Plan of care discussed with patient; verbalized understanding using teach-back method. No acute events overnight. Patient remained free of falls and injuries. Continuing [id number] Q4. Patient refused 2200 dose and preffered 2mg Dilauded instead. Monitoring daily weights and I's and O's. Denies having SoB or CP. VSS. Will continue to monitor.

## 2020-01-15 LAB
ALBUMIN SERPL BCP-MCNC: 3.5 G/DL (ref 3.5–5.2)
ALP SERPL-CCNC: 82 U/L (ref 55–135)
ALT SERPL W/O P-5'-P-CCNC: 17 U/L (ref 10–44)
ANION GAP SERPL CALC-SCNC: 8 MMOL/L (ref 8–16)
AST SERPL-CCNC: 21 U/L (ref 10–40)
BACTERIA BLD CULT: NORMAL
BACTERIA BLD CULT: NORMAL
BASOPHILS # BLD AUTO: 0.03 K/UL (ref 0–0.2)
BASOPHILS NFR BLD: 0.4 % (ref 0–1.9)
BILIRUB SERPL-MCNC: 0.3 MG/DL (ref 0.1–1)
BUN SERPL-MCNC: 10 MG/DL (ref 6–20)
CALCIUM SERPL-MCNC: 8.7 MG/DL (ref 8.7–10.5)
CHLORIDE SERPL-SCNC: 102 MMOL/L (ref 95–110)
CO2 SERPL-SCNC: 28 MMOL/L (ref 23–29)
CREAT SERPL-MCNC: 0.8 MG/DL (ref 0.5–1.4)
DIFFERENTIAL METHOD: ABNORMAL
EOSINOPHIL # BLD AUTO: 0 K/UL (ref 0–0.5)
EOSINOPHIL NFR BLD: 0 % (ref 0–8)
ERYTHROCYTE [DISTWIDTH] IN BLOOD BY AUTOMATED COUNT: 24.1 % (ref 11.5–14.5)
EST. GFR  (AFRICAN AMERICAN): >60 ML/MIN/1.73 M^2
EST. GFR  (NON AFRICAN AMERICAN): >60 ML/MIN/1.73 M^2
GLUCOSE SERPL-MCNC: 109 MG/DL (ref 70–110)
HCT VFR BLD AUTO: 31.5 % (ref 37–48.5)
HGB BLD-MCNC: 9.3 G/DL (ref 12–16)
HGB FRACT BLD ELPH-IMP: NORMAL
HGB S MFR BLD ELPH: 67 %
IMM GRANULOCYTES # BLD AUTO: 0.02 K/UL (ref 0–0.04)
IMM GRANULOCYTES NFR BLD AUTO: 0.3 % (ref 0–0.5)
LYMPHOCYTES # BLD AUTO: 3.2 K/UL (ref 1–4.8)
LYMPHOCYTES NFR BLD: 46.8 % (ref 18–48)
MAGNESIUM SERPL-MCNC: 2.1 MG/DL (ref 1.6–2.6)
MCH RBC QN AUTO: 19.7 PG (ref 27–31)
MCHC RBC AUTO-ENTMCNC: 29.5 G/DL (ref 32–36)
MCV RBC AUTO: 67 FL (ref 82–98)
MONOCYTES # BLD AUTO: 0.7 K/UL (ref 0.3–1)
MONOCYTES NFR BLD: 10.3 % (ref 4–15)
NEUTROPHILS # BLD AUTO: 2.9 K/UL (ref 1.8–7.7)
NEUTROPHILS NFR BLD: 42.2 % (ref 38–73)
NRBC BLD-RTO: 6 /100 WBC
PATH REV BLD -IMP: NORMAL
PHOSPHATE SERPL-MCNC: 4.1 MG/DL (ref 2.7–4.5)
PLATELET # BLD AUTO: 176 K/UL (ref 150–350)
PMV BLD AUTO: 9.5 FL (ref 9.2–12.9)
POTASSIUM SERPL-SCNC: 4.1 MMOL/L (ref 3.5–5.1)
PROT SERPL-MCNC: 7.4 G/DL (ref 6–8.4)
RBC # BLD AUTO: 4.71 M/UL (ref 4–5.4)
RETICS/RBC NFR AUTO: 5.3 % (ref 0.5–2.5)
SODIUM SERPL-SCNC: 138 MMOL/L (ref 136–145)
WBC # BLD AUTO: 6.88 K/UL (ref 3.9–12.7)

## 2020-01-15 PROCEDURE — 85025 COMPLETE CBC W/AUTO DIFF WBC: CPT

## 2020-01-15 PROCEDURE — 99223 PR INITIAL HOSPITAL CARE,LEVL III: ICD-10-PCS | Mod: ,,, | Performed by: INTERNAL MEDICINE

## 2020-01-15 PROCEDURE — 94770 HC EXHALED C02 TEST: CPT

## 2020-01-15 PROCEDURE — 99900035 HC TECH TIME PER 15 MIN (STAT)

## 2020-01-15 PROCEDURE — 36415 COLL VENOUS BLD VENIPUNCTURE: CPT

## 2020-01-15 PROCEDURE — 84100 ASSAY OF PHOSPHORUS: CPT

## 2020-01-15 PROCEDURE — 63600175 PHARM REV CODE 636 W HCPCS: Performed by: STUDENT IN AN ORGANIZED HEALTH CARE EDUCATION/TRAINING PROGRAM

## 2020-01-15 PROCEDURE — 97162 PT EVAL MOD COMPLEX 30 MIN: CPT

## 2020-01-15 PROCEDURE — 25000003 PHARM REV CODE 250: Performed by: STUDENT IN AN ORGANIZED HEALTH CARE EDUCATION/TRAINING PROGRAM

## 2020-01-15 PROCEDURE — 80053 COMPREHEN METABOLIC PANEL: CPT

## 2020-01-15 PROCEDURE — 83735 ASSAY OF MAGNESIUM: CPT

## 2020-01-15 PROCEDURE — 99233 SBSQ HOSP IP/OBS HIGH 50: CPT | Mod: ,,, | Performed by: HOSPITALIST

## 2020-01-15 PROCEDURE — 97165 OT EVAL LOW COMPLEX 30 MIN: CPT

## 2020-01-15 PROCEDURE — 99233 PR SUBSEQUENT HOSPITAL CARE,LEVL III: ICD-10-PCS | Mod: ,,, | Performed by: HOSPITALIST

## 2020-01-15 PROCEDURE — 99223 1ST HOSP IP/OBS HIGH 75: CPT | Mod: ,,, | Performed by: INTERNAL MEDICINE

## 2020-01-15 PROCEDURE — 97116 GAIT TRAINING THERAPY: CPT

## 2020-01-15 PROCEDURE — 25000003 PHARM REV CODE 250: Performed by: HOSPITALIST

## 2020-01-15 PROCEDURE — 85045 AUTOMATED RETICULOCYTE COUNT: CPT

## 2020-01-15 PROCEDURE — 11000001 HC ACUTE MED/SURG PRIVATE ROOM

## 2020-01-15 PROCEDURE — 63600175 PHARM REV CODE 636 W HCPCS: Performed by: HOSPITALIST

## 2020-01-15 RX ORDER — LACTULOSE 10 G/15ML
10 SOLUTION ORAL 2 TIMES DAILY
Status: DISCONTINUED | OUTPATIENT
Start: 2020-01-15 | End: 2020-01-19 | Stop reason: HOSPADM

## 2020-01-15 RX ORDER — HYDROMORPHONE HYDROCHLORIDE 1 MG/ML
1 INJECTION, SOLUTION INTRAMUSCULAR; INTRAVENOUS; SUBCUTANEOUS ONCE
Status: COMPLETED | OUTPATIENT
Start: 2020-01-15 | End: 2020-01-15

## 2020-01-15 RX ADMIN — FOLIC ACID 1 MG: 1 TABLET ORAL at 09:01

## 2020-01-15 RX ADMIN — MEDROXYPROGESTERONE ACETATE 20 MG: 10 TABLET ORAL at 09:01

## 2020-01-15 RX ADMIN — FLUOXETINE 40 MG: 20 CAPSULE ORAL at 09:01

## 2020-01-15 RX ADMIN — CARBAMAZEPINE 400 MG: 200 TABLET ORAL at 09:01

## 2020-01-15 RX ADMIN — HYDROMORPHONE HYDROCHLORIDE 1 MG: 1 INJECTION, SOLUTION INTRAMUSCULAR; INTRAVENOUS; SUBCUTANEOUS at 11:01

## 2020-01-15 RX ADMIN — HYDROCHLOROTHIAZIDE 25 MG: 12.5 TABLET ORAL at 09:01

## 2020-01-15 RX ADMIN — DIPHENHYDRAMINE HYDROCHLORIDE 25 MG: 25 CAPSULE ORAL at 09:01

## 2020-01-15 RX ADMIN — CARVEDILOL 25 MG: 25 TABLET, FILM COATED ORAL at 09:01

## 2020-01-15 RX ADMIN — DIPHENHYDRAMINE HYDROCHLORIDE 25 MG: 25 CAPSULE ORAL at 03:01

## 2020-01-15 RX ADMIN — SODIUM CHLORIDE: 0.45 INJECTION, SOLUTION INTRAVENOUS at 01:01

## 2020-01-15 RX ADMIN — AMLODIPINE BESYLATE 10 MG: 10 TABLET ORAL at 09:01

## 2020-01-15 RX ADMIN — GABAPENTIN 800 MG: 400 CAPSULE ORAL at 09:01

## 2020-01-15 RX ADMIN — Medication: at 11:01

## 2020-01-15 RX ADMIN — SENNOSIDES AND DOCUSATE SODIUM 2 TABLET: 8.6; 5 TABLET ORAL at 09:01

## 2020-01-15 RX ADMIN — MEDROXYPROGESTERONE ACETATE 20 MG: 10 TABLET ORAL at 03:01

## 2020-01-15 RX ADMIN — LACTULOSE 10 G: 20 SOLUTION ORAL at 09:01

## 2020-01-15 RX ADMIN — Medication: at 08:01

## 2020-01-15 RX ADMIN — Medication: at 05:01

## 2020-01-15 RX ADMIN — OXYCODONE HYDROCHLORIDE 20 MG: 20 TABLET, FILM COATED, EXTENDED RELEASE ORAL at 09:01

## 2020-01-15 RX ADMIN — LACTULOSE 10 G: 20 SOLUTION ORAL at 03:01

## 2020-01-15 RX ADMIN — ENOXAPARIN SODIUM 40 MG: 100 INJECTION SUBCUTANEOUS at 09:01

## 2020-01-15 RX ADMIN — GABAPENTIN 800 MG: 400 CAPSULE ORAL at 03:01

## 2020-01-15 RX ADMIN — ACETAMINOPHEN 1000 MG: 500 TABLET ORAL at 09:01

## 2020-01-15 RX ADMIN — DIPHENHYDRAMINE HYDROCHLORIDE 25 MG: 25 CAPSULE ORAL at 07:01

## 2020-01-15 RX ADMIN — POLYETHYLENE GLYCOL 3350 17 G: 17 POWDER, FOR SOLUTION ORAL at 09:01

## 2020-01-15 RX ADMIN — ACETAMINOPHEN 1000 MG: 500 TABLET ORAL at 03:01

## 2020-01-15 NOTE — PLAN OF CARE
Pain management and pain medications reviewed with pt. , pt. Verbalized understanding of information given

## 2020-01-15 NOTE — ASSESSMENT & PLAN NOTE
Ms Malone is 39y.o female with medical issue of sickle cell disease with beta thalassemia who is admitted for acute sickle cell crisis most likely brought on by her menorrhagia and weather change. Patient receiving IVF and on PCA pump. No significant increase in sickle cell or schistocytes seen in peripheral smear. Hemoglobin S electrophoresis is pending. Patient recently finished her menstrual cycle which she did not need transfusion. Patient consulted hem/onc for further management of sickle cell crisis which is lasting longer than her previous episodes. Patient's pain is better controlled with PCA pump and on IVF. Patient's Hgb nearly at baseline and not having any acute chest.      Recommendation  - Agree with using PCA pump to control pain and wean down to PO pain med when tolerated.   - Continue IVF   - Follow up on Hgb S electrophoresis result, consider exchange transfusion if significant sickle cell seen on electrophoresis. Unclear benefit with exchange transfusion since patient's peripheral blood smear did not reveal sickling of blood cell.

## 2020-01-15 NOTE — PT/OT/SLP EVAL
"Physical Therapy Evaluation    Patient Name:  Nazanin Malone   MRN:  [id number]    Recommendations:     Discharge Recommendations:  home   Discharge Equipment Recommendations: none   Barriers to discharge: None    Assessment:     Nazanin Malone is a 39 y.o. female admitted with a medical diagnosis of Sickle cell disease, type S beta-zero thalassemia with crisis.  She presents with the following impairments/functional limitations:  impaired functional mobilty, impaired cardiopulmonary response to activity, decreased lower extremity function, impaired endurance, weakness, edema. Patient was able to ambulate 175 feet x2 without SOB or feelings of weakness. Patient's functional mobility is limited mostly by pain at this time. This session, patient ascended/descended 2 stairs with contact guard assistance, limited by line length. Patient is safe to return home pending further stair assessment 2/2 patient's home having 15 stairs to enter.   Rehab Prognosis: Good; patient would benefit from acute skilled PT services to address these deficits and reach maximum level of function.    Recent Surgery: * No surgery found *      Plan:     During this hospitalization, patient to be seen 2 x/week to address the identified rehab impairments via gait training, neuromuscular re-education, therapeutic activities, therapeutic exercises and progress toward the following goals:    · Plan of Care Expires:  02/14/20    Subjective     Chief Complaint: "My back hurts"  Patient/Family Comments/goals: To decrease pain  Pain/Comfort:  Pain Rating 1: 7/10  Location 1: back  Pain Addressed 1: Reposition, Pre-medicate for activity  Pain Rating Post-Intervention 1: 5/10    Patients cultural, spiritual, Adventism conflicts given the current situation: no    Living Environment:  Prior to admission, patient was living with her mom and 2 daughters in a 2 story home with 15 PONCE and bilateral handrails. Her bathroom has a tub/shower combination set " up with 1 grab bar. She was previously independent with ADLs and ambulation. She was driving and working as a nurse. She denies any recent falls.   Equipment used at home: grab bar.  DME owned (not currently used): none.  Upon discharge, patient will have assistance from daughters.    Objective:     Communicated with RN prior to session.  Patient found HOB elevated with peripheral IV, PCA, oxygen(ETCO2 monitor, compression stockings bilaterally)  upon PT entry to room. Clinical instructor present and directly supervising therapy session.    General Precautions: Standard, fall   Orthopedic Precautions:N/A   Braces: N/A     Exams:    Cognitive Exam  Patient is A&O x4 and follows 100% of one -step commands    Fine Motor Coordination   -  WFL based on gross functional assessment   Postural Exam Patient presented with the following abnormalities:    -       Rounded shoulders  -       Forward head  -       Kyphosis  -       Posterior pelvic tilt   Sensation    -       Light touch intact   Skin Integrity/Edema     -       Skin integrity: visibility  -       Edema: moderate edema bilateral lower extremities    R LE ROM WFL   R LE Strength WFL   L LE ROM WFL   L LE Strength WFL       Functional Mobility:    Bed Mobility  Rolling: not assessed due to patient sitting up   Supine to Sit:  supervision   Transfers Sit to Stand:  stand by assistance   Gait  Gait Distance: 175 ft x2 with no AD  Assistance Level: stand by assistance  Description: WBOS, Bilateral pelvic drop (L>R), decreased sanjuana    Cues for pacing and breathing techniques   Stairs Ascended/descended: 2 stairs   Quality: Step through pattern  Level of assist: contact guard assist  Rails used: unilateral UE support    Able to turn to the R on staircase using BUE support for balance with contact guard assistance       Therapeutic Activities and Exercises:   Patient able to ambulate with RN assist of 1 person.  Patient educated on:  -Encouraged to mobilize OOB only  with RN assist  -Role and goals of PT and plan of care during hospitalization   -Breathing techniques and pacing during ambulation to improve energy conservation    Patient verbalized understanding. In agreement with plan of care.       AM-PAC 6 CLICK MOBILITY  Total Score:24     Patient left sitting EOB with all lines intact, call button in reach and daughter present.    GOALS:   Multidisciplinary Problems     Physical Therapy Goals        Problem: Physical Therapy Goal    Goal Priority Disciplines Outcome Goal Variances Interventions   Physical Therapy Goal     PT, PT/OT Ongoing, Progressing     Description:  Goals to be met by: 2020    Patient will increase functional independence with mobility by performin. Supine to sit with Modified Tensas.  2. Gait  x 250 feet with Supervision using no AD and no cues for pacing and energy conservation.   3. Ascend/descend 15 stair with bilateral Handrails and Supervision.   4. Sit to stand with modified independence.                      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    Asthma     Depression     Hypertension     Morbid obesity     Opioid dependence 2017    Pneumonia due to Streptococcus pneumoniae 2017    Right lower lobe pneumonia 2017    Sepsis due to Streptococcus pneumoniae 2017    Sickle cell-beta thalassemia disease with pain     Trigeminal neuralgia        Past Surgical History:   Procedure Laterality Date     SECTION      TONSILLECTOMY      TUBAL LIGATION         Time Tracking:     PT Received On: 01/15/20  PT Start Time: 7     PT Stop Time: 0250  PT Total Time (min): 23 min     Billable Minutes: Evaluation 8 and Gait Training 15      Cortney Chairez, KYREE  01/15/2020

## 2020-01-15 NOTE — CONSULTS
[facility]  Hematology/Oncology  Consult Note    Patient Name: Nazanin Malone  MRN: [id number]  Admission Date: 1/9/2020  Hospital Length of Stay: 6 days  Code Status: Full Code   Attending Provider: Johnny Bryant MD  Consulting Provider: Mariann Sanchez MD  Primary Care Physician: Primary Doctor No  Principal Problem:Sickle cell disease, type S beta-zero thalassemia with crisis    Inpatient consult to Hematology/Oncology  Consult performed by: Mariann Sanchez MD  Consult ordered by: Waldemar Duffy MD        Subjective:     HPI:  Ms. Malone is a 40 yo female with medical issues of sickle cell beta thalassemia, who presents with chief complaint of pain in her legs. She was admitted for sickle cell crisis. Hem/Onc consulted for worse than usually sickle cell crisis and primary team needing assist managing current crisis. She says the pain is similar to previous sickle cell crises trigger by the weather change. She says that she had a cold a week or two ago which she has recovered from.       She sees Dr. Montgomery for management of sickle cell and was most recently saw 12/19. She has been admitted 3 times in the last year for crisis (most recently in august 2019). It appears her baseline hemoglobin lies between 9-10. She has tried hydroxyurea before but did not tolerate/had a rash. Last time she saw Dr. Montgomery, she had low ferritin of 2 and was recommended to be given feraheme.      Up on admission, patient received IVF and pain management  includes home percocet and IV dilaudid for severe pain. Patient also c/o of menorrhagia much more so than her normal amount and was started on medroxyprogesterone for 7 days to help decrease her bleeding.  Patient's H/H continues to drop with increase in her retic count. Suspecting anemia 2/2 menstrual period which has improved with starting progesterone and folate, b12 supplementation. Patient was started on PCA pump for pain management and her  current pain scale at 7.  Patient complaining of some fatigue, chill, CP, abdominal cramp and shortness of breath with exertion. She denies, cough, congestion, fevers, nausea, vomiting, diarrhea, and constipation. Patient wanting to know if she can have hysterectomy.     Oncology Treatment Plan:   [No treatment plan]    Medications:  Continuous Infusions:   sodium chloride 0.45% 75 mL/hr at 20 1329    hydromorphone in 0.9 % NaCl 6 mg/30 ml       Scheduled Meds:   acetaminophen  1,000 mg Oral Q6H    amLODIPine  10 mg Oral Daily    carBAMazepine  400 mg Oral BID    carvedilol  25 mg Oral BID    enoxaparin  40 mg Subcutaneous Q12H    FLUoxetine  40 mg Oral Daily    fluticasone furoate-vilanterol  1 puff Inhalation Daily    folic acid  1 mg Oral Daily    gabapentin  800 mg Oral TID    hydroCHLOROthiazide  25 mg Oral Daily    medroxyPROGESTERone  20 mg Oral TID    oxyCODONE  20 mg Oral Q12H    polyethylene glycol  17 g Oral Daily    senna-docusate 8.6-50 mg  2 tablet Oral BID     PRN Meds:albuterol-ipratropium, diphenhydrAMINE, hydrALAZINE, influenza, melatonin, naloxone, ondansetron, prochlorperazine, sodium chloride 0.9%     Review of patient's allergies indicates:  No Known Allergies     Past Medical History:   Diagnosis Date    Abnormal Pap smear of cervix     colposcopy    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    Asthma     Depression     Hypertension     Morbid obesity     Opioid dependence 2017    Pneumonia due to Streptococcus pneumoniae 2017    Right lower lobe pneumonia 2017    Sepsis due to Streptococcus pneumoniae 2017    Sickle cell-beta thalassemia disease with pain     Trigeminal neuralgia      Past Surgical History:   Procedure Laterality Date     SECTION      TONSILLECTOMY      TUBAL LIGATION       Family History     Problem Relation (Age of Onset)    Diabetes Mother    Heart disease Mother, Father        Tobacco  Use    Smoking status: Never Smoker    Smokeless tobacco: Never Used   Substance and Sexual Activity    Alcohol use: No    Drug use: No    Sexual activity: Not Currently     Birth control/protection: See Surgical Hx       Review of Systems   Constitutional: Positive for chills and fatigue. Negative for activity change, appetite change, diaphoresis and fever.   HENT: Negative for congestion and sore throat.    Eyes: Negative for visual disturbance.   Respiratory: Positive for shortness of breath. Negative for cough and choking.    Cardiovascular: Positive for chest pain. Negative for palpitations and leg swelling.   Gastrointestinal: Positive for abdominal pain. Negative for abdominal distention, anal bleeding, blood in stool, constipation, diarrhea, nausea and vomiting.   Genitourinary: Negative for difficulty urinating.   Musculoskeletal: Positive for arthralgias and myalgias. Negative for neck pain.   Skin: Negative for color change.   Neurological: Negative for dizziness, light-headedness and headaches.   Psychiatric/Behavioral: Negative for agitation, behavioral problems and confusion.     Objective:     Vital Signs (Most Recent):  Temp: 99 °F (37.2 °C) (01/15/20 0821)  Pulse: 80 (01/15/20 0821)  Resp: 16 (01/15/20 0821)  BP: (!) 193/86 (01/15/20 0821)  SpO2: 96 % (01/15/20 0821) Vital Signs (24h Range):  Temp:  [98.4 °F (36.9 °C)-99 °F (37.2 °C)] 99 °F (37.2 °C)  Pulse:  [72-86] 80  Resp:  [16-18] 16  SpO2:  [91 %-100 %] 96 %  BP: (141-193)/(78-99) 193/86     Weight: (!) 147 kg (324 lb)  Body mass index is 59.26 kg/m².  Body surface area is 2.54 meters squared.    No intake or output data in the 24 hours ending 01/15/20 1301    Physical Exam   Constitutional: She is oriented to person, place, and time. She appears well-developed and well-nourished. No distress.   Patient is a morbidly obes   HENT:   Head: Normocephalic and atraumatic.   Mouth/Throat: Oropharynx is clear and moist.   Eyes: Pupils are equal,  round, and reactive to light. EOM are normal.   Neck: Normal range of motion. Neck supple.   Cardiovascular: Normal rate, regular rhythm, normal heart sounds and intact distal pulses.   Pulmonary/Chest: Effort normal and breath sounds normal. No stridor. No respiratory distress. She has no wheezes.   Abdominal: Soft. Bowel sounds are normal. She exhibits no distension. There is no tenderness.   Musculoskeletal: Normal range of motion. She exhibits tenderness (Bilateral lower extremity ). She exhibits no edema or deformity.   Lymphadenopathy:     She has no cervical adenopathy.   Neurological: She is alert and oriented to person, place, and time.   Skin: Skin is warm and dry. Capillary refill takes less than 2 seconds.   Psychiatric: She has a normal mood and affect.       Significant Labs:   CBC:   Recent Labs   Lab 01/14/20  0336 01/14/20  1301 01/15/20  0714   WBC 6.73 6.60 6.88   HGB 8.7* 9.1* 9.3*   HCT 29.3* 30.9* 31.5*    157 176   , CMP:   Recent Labs   Lab 01/14/20  0335 01/15/20  0714    138   K 4.0 4.1    102   CO2 26 28   GLU 96 109   BUN 9 10   CREATININE 0.7 0.8   CALCIUM 8.7 8.7   PROT 6.9 7.4   ALBUMIN 3.2* 3.5   BILITOT 0.3 0.3   ALKPHOS 79 82   AST 22 21   ALT 17 17   ANIONGAP 10 8   EGFRNONAA >60.0 >60.0   , Coagulation: No results for input(s): PT, INR, APTT in the last 48 hours., Reticulocytes:   Recent Labs   Lab 01/14/20  0336 01/15/20  0714   RETIC 5.4* 5.3*    and All pertinent labs from the last 24 hours have been reviewed.    Diagnostic Results:  I have reviewed all pertinent imaging results/findings within the past 24 hours.    Assessment/Plan:     * Sickle cell disease, type S beta-zero thalassemia with crisis  Ms Encalade is 39y.o female with medical issue of sickle cell disease with beta thalassemia who is admitted for acute sickle cell crisis most likely brought on by her menorrhagia and weather change. Patient receiving IVF and on PCA pump. No significant increase  in sickle cell or schistocytes seen in peripheral smear. Hemoglobin S electrophoresis is pending. Patient recently finished her menstrual cycle which she did not need transfusion. Patient consulted hem/onc for further management of sickle cell crisis which is lasting longer than her previous episodes. Patient's pain is better controlled with PCA pump and on IVF. Patient's Hgb nearly at baseline and not having any acute chest.      Recommendation  - Agree with using PCA pump to control pain and wean down to PO pain med when tolerated.   - Continue IVF   - Follow up on Hgb S electrophoresis result, consider exchange transfusion if significant sickle cell seen on electrophoresis. Unclear benefit with exchange transfusion since patient's peripheral blood smear did not reveal sickling of blood cell.         Thank you for your consult. I will follow-up with patient. Please contact us if you have any additional questions.    DESI Sanchez MD  Hematology/Oncology  [facility]

## 2020-01-15 NOTE — SUBJECTIVE & OBJECTIVE
Oncology Treatment Plan:   [No treatment plan]    Medications:  Continuous Infusions:   sodium chloride 0.45% 75 mL/hr at 20 1329    hydromorphone in 0.9 % NaCl 6 mg/30 ml       Scheduled Meds:   acetaminophen  1,000 mg Oral Q6H    amLODIPine  10 mg Oral Daily    carBAMazepine  400 mg Oral BID    carvedilol  25 mg Oral BID    enoxaparin  40 mg Subcutaneous Q12H    FLUoxetine  40 mg Oral Daily    fluticasone furoate-vilanterol  1 puff Inhalation Daily    folic acid  1 mg Oral Daily    gabapentin  800 mg Oral TID    hydroCHLOROthiazide  25 mg Oral Daily    medroxyPROGESTERone  20 mg Oral TID    oxyCODONE  20 mg Oral Q12H    polyethylene glycol  17 g Oral Daily    senna-docusate 8.6-50 mg  2 tablet Oral BID     PRN Meds:albuterol-ipratropium, diphenhydrAMINE, hydrALAZINE, influenza, melatonin, naloxone, ondansetron, prochlorperazine, sodium chloride 0.9%     Review of patient's allergies indicates:  No Known Allergies     Past Medical History:   Diagnosis Date    Abnormal Pap smear of cervix 2013    colposcopy    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    Asthma     Depression     Hypertension     Morbid obesity     Opioid dependence 2017    Pneumonia due to Streptococcus pneumoniae 2017    Right lower lobe pneumonia 2017    Sepsis due to Streptococcus pneumoniae 2017    Sickle cell-beta thalassemia disease with pain     Trigeminal neuralgia      Past Surgical History:   Procedure Laterality Date     SECTION      TONSILLECTOMY      TUBAL LIGATION       Family History     Problem Relation (Age of Onset)    Diabetes Mother    Heart disease Mother, Father        Tobacco Use    Smoking status: Never Smoker    Smokeless tobacco: Never Used   Substance and Sexual Activity    Alcohol use: No    Drug use: No    Sexual activity: Not Currently     Birth control/protection: See Surgical Hx       Review of Systems   Constitutional: Positive  for chills and fatigue. Negative for activity change, appetite change, diaphoresis and fever.   HENT: Negative for congestion and sore throat.    Eyes: Negative for visual disturbance.   Respiratory: Positive for shortness of breath. Negative for cough and choking.    Cardiovascular: Positive for chest pain. Negative for palpitations and leg swelling.   Gastrointestinal: Positive for abdominal pain. Negative for abdominal distention, anal bleeding, blood in stool, constipation, diarrhea, nausea and vomiting.   Genitourinary: Negative for difficulty urinating.   Musculoskeletal: Positive for arthralgias and myalgias. Negative for neck pain.   Skin: Negative for color change.   Neurological: Negative for dizziness, light-headedness and headaches.   Psychiatric/Behavioral: Negative for agitation, behavioral problems and confusion.     Objective:     Vital Signs (Most Recent):  Temp: 99 °F (37.2 °C) (01/15/20 0821)  Pulse: 80 (01/15/20 0821)  Resp: 16 (01/15/20 0821)  BP: (!) 193/86 (01/15/20 0821)  SpO2: 96 % (01/15/20 0821) Vital Signs (24h Range):  Temp:  [98.4 °F (36.9 °C)-99 °F (37.2 °C)] 99 °F (37.2 °C)  Pulse:  [72-86] 80  Resp:  [16-18] 16  SpO2:  [91 %-100 %] 96 %  BP: (141-193)/(78-99) 193/86     Weight: (!) 147 kg (324 lb)  Body mass index is 59.26 kg/m².  Body surface area is 2.54 meters squared.    No intake or output data in the 24 hours ending 01/15/20 1301    Physical Exam   Constitutional: She is oriented to person, place, and time. She appears well-developed and well-nourished. No distress.   Patient is a morbidly obes   HENT:   Head: Normocephalic and atraumatic.   Mouth/Throat: Oropharynx is clear and moist.   Eyes: Pupils are equal, round, and reactive to light. EOM are normal.   Neck: Normal range of motion. Neck supple.   Cardiovascular: Normal rate, regular rhythm, normal heart sounds and intact distal pulses.   Pulmonary/Chest: Effort normal and breath sounds normal. No stridor. No respiratory  distress. She has no wheezes.   Abdominal: Soft. Bowel sounds are normal. She exhibits no distension. There is no tenderness.   Musculoskeletal: Normal range of motion. She exhibits tenderness (Bilateral lower extremity ). She exhibits no edema or deformity.   Lymphadenopathy:     She has no cervical adenopathy.   Neurological: She is alert and oriented to person, place, and time.   Skin: Skin is warm and dry. Capillary refill takes less than 2 seconds.   Psychiatric: She has a normal mood and affect.       Significant Labs:   CBC:   Recent Labs   Lab 01/14/20  0336 01/14/20  1301 01/15/20  0714   WBC 6.73 6.60 6.88   HGB 8.7* 9.1* 9.3*   HCT 29.3* 30.9* 31.5*    157 176   , CMP:   Recent Labs   Lab 01/14/20  0335 01/15/20  0714    138   K 4.0 4.1    102   CO2 26 28   GLU 96 109   BUN 9 10   CREATININE 0.7 0.8   CALCIUM 8.7 8.7   PROT 6.9 7.4   ALBUMIN 3.2* 3.5   BILITOT 0.3 0.3   ALKPHOS 79 82   AST 22 21   ALT 17 17   ANIONGAP 10 8   EGFRNONAA >60.0 >60.0   , Coagulation: No results for input(s): PT, INR, APTT in the last 48 hours., Reticulocytes:   Recent Labs   Lab 01/14/20  0336 01/15/20  0714   RETIC 5.4* 5.3*    and All pertinent labs from the last 24 hours have been reviewed.    Diagnostic Results:  I have reviewed all pertinent imaging results/findings within the past 24 hours.

## 2020-01-15 NOTE — ASSESSMENT & PLAN NOTE
Patient has a history of menorrhagia and most recently had a ferritin of 2. She is s/p 1 dose of feraheme on Tuesday and is supposed to receive another dose next Tuesday. She has tried oral iron supplementation in the past but remembers not being able to tolerate . Patient states that she chronically has heavy menstrual periods; however, this current cycle is much heavier than usual.   -Pelvic U/s showed enlarged uterus with normal endometrium     Plan:  -OBGYN consulted for AUB, appreciate recs  -continue medroxyprogesterone x7 days to help decrease her acute bleeding.   -Trend CBC daily  -Outpatient f/u with OBGYN

## 2020-01-15 NOTE — SUBJECTIVE & OBJECTIVE
Interval History:  Patient continues to have LE and BL hip pain unctonrolled on current regimen. See hospital course.     Review of Systems   Constitutional: Negative for chills and fever.   Respiratory: Negative for cough and shortness of breath.    Cardiovascular: Positive for leg swelling (and pain). Negative for chest pain.   Gastrointestinal: Negative for abdominal distention, abdominal pain, nausea and vomiting.   Musculoskeletal: Negative for arthralgias and back pain.        Hip pain BL   Neurological: Negative for weakness and headaches.     Objective:     Vital Signs (Most Recent):  Temp: 99 °F (37.2 °C) (01/15/20 0821)  Pulse: 80 (01/15/20 0821)  Resp: 16 (01/15/20 0821)  BP: (!) 193/86 (01/15/20 0821)  SpO2: 96 % (01/15/20 0821) Vital Signs (24h Range):  Temp:  [98.4 °F (36.9 °C)-99 °F (37.2 °C)] 99 °F (37.2 °C)  Pulse:  [72-90] 80  Resp:  [16-18] 16  SpO2:  [91 %-100 %] 96 %  BP: (141-193)/(78-99) 193/86     Weight: (!) 147 kg (324 lb)  Body mass index is 59.26 kg/m².  No intake or output data in the 24 hours ending 01/15/20 1130   Physical Exam   Constitutional: She is oriented to person, place, and time.   Patient is a morbidly obese BF who appears stated age, alert, and in NAD.    HENT:   Head: Normocephalic and atraumatic.   Eyes: Pupils are equal, round, and reactive to light. EOM are normal.   Neck: Normal range of motion. Neck supple.   Cardiovascular: Normal rate, regular rhythm and normal heart sounds.   Pulmonary/Chest: Effort normal and breath sounds normal. No stridor. No respiratory distress. She has no wheezes.   Abdominal: Soft. Bowel sounds are normal. She exhibits no distension. There is no tenderness.   Musculoskeletal: Normal range of motion. She exhibits no edema.   JOSE LUIS hose present on BL LE.  Tenderness to palpation of LE BL   Neurological: She is alert and oriented to person, place, and time.   Skin: Skin is warm and dry. Capillary refill takes less than 2 seconds.   Psychiatric:  She has a normal mood and affect.       Significant Labs:   CBC:   Recent Labs   Lab 01/14/20  0336 01/14/20  1301 01/15/20  0714   WBC 6.73 6.60 6.88   HGB 8.7* 9.1* 9.3*   HCT 29.3* 30.9* 31.5*    157 176     CMP:   Recent Labs   Lab 01/14/20  0335 01/15/20  0714    138   K 4.0 4.1    102   CO2 26 28   GLU 96 109   BUN 9 10   CREATININE 0.7 0.8   CALCIUM 8.7 8.7   PROT 6.9 7.4   ALBUMIN 3.2* 3.5   BILITOT 0.3 0.3   ALKPHOS 79 82   AST 22 21   ALT 17 17   ANIONGAP 10 8   EGFRNONAA >60.0 >60.0       Significant Imaging: I have reviewed and interpreted all pertinent imaging results/findings within the past 24 hours.

## 2020-01-15 NOTE — ASSESSMENT & PLAN NOTE
Patient has a history of intermittent asthma for which she takes symbicort inhaler daily with albuterol or duonebs as needed. She has been experiencing 4 day history of shortness of breath with exertion, improved with rest, with associated leg swelling. No orthopnea or PND. Differential includes asthma exacerbation vs HF vs anemia vs deconditioning vs PE. She has used her inhaler without relief. Her las echo was in August and showed low normal EF 50-55% and concentric LVH.   -TTE: 60% EF. Concentric LVH. Indeterminate amount of diastolic dysfunction.    Plan:   -Continue controller inhaler while inpatient  -Duonebs as needed for shortness of breath  RESOLVED

## 2020-01-15 NOTE — ASSESSMENT & PLAN NOTE
Home medication: amlodipine 10 mg qd, Coreg 25 mg BID(patient has actually been taking incorrectly 12.5 BID), and HCTZ 25 mg daily    Plan:  -Continue home medications   -Ensure adequate pain control before giving PRN hydralazine  -Patient continues to have fluctuating levels of BP from SBP of 200 to 130.  Likely associated with pain and pain relief with PRN medication.  Will continue to monitor.

## 2020-01-15 NOTE — PT/OT/SLP EVAL
Occupational Therapy   Evaluation and Discharge Note    Name: Nazanin Malone  MRN: [id number]  Admitting Diagnosis:  Sickle cell disease, type S beta-zero thalassemia with crisis      Recommendations:     Discharge Recommendations: home  Discharge Equipment Recommendations:  none  Barriers to discharge:  Inaccessible home environment(15 PONCE)    Assessment:     Nazanin Malone is a 39 y.o. female with a medical diagnosis of Sickle cell disease, type S beta-zero thalassemia with crisis. At this time, patient is functioning at their prior level of function and does not require further acute OT services.     Plan:     During this hospitalization, patient does not require further acute OT services.  Please re-consult if situation changes.    · Plan of Care Reviewed with: patient    Subjective     Chief Complaint: pt c/o of back and leg pain  Patient/Family Comments/goals: to get better and return home    Occupational Profile:  Living Environment: Pt lives with her 2 daughters in a 2SH with 15 PONCE. 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  Patient found HOB elevated with PCA, peripheral IV upon OT entry to room.    General Precautions: Standard, fall   Orthopedic Precautions:N/A   Braces: N/A     Occupational Performance:    Bed Mobility:    · Patient completed Supine to Sit with modified independence  · Patient completed Sit to Supine with modified independence   · HOB  elevated    Functional Mobility/Transfers:  · Patient completed Sit <> Stand Transfer with supervision  with  no assistive device   · Patient completed Toilet Transfer Step Transfer technique with supervision with  no AD  · Functional Mobility: Pt ambulated within room with supervision and no AD. No LOB, SOB, or c/o of dizziness    Activities of Daily Living:  Pt reports independence in ADLs. Reports independence in toileting and pt found in own clothes    Cognitive/Visual Perceptual:  Cognitive/Psychosocial Skills:     -       Oriented to: Person, Place, Time and Situation   -       Follows Commands/attention:Follows multistep  commands  -       Communication: clear/fluent  -       Memory: No Deficits noted  -       Safety awareness/insight to disability: intact   -       Mood/Affect/Coping skills/emotional control: Appropriate to situation    Physical Exam:  Upper Extremity Range of Motion:     -       Right Upper Extremity: WFL  -       Left Upper Extremity: WFL  Upper Extremity Strength:    -       Right Upper Extremity: WFL  -       Left Upper Extremity: WFL   Strength:    -       Right Upper Extremity: WFL  -       Left Upper Extremity: WFL  Fine Motor Coordination:    -       Intact    AMPAC 6 Click ADL:  AMPAC Total Score: 24    Treatment & Education:  -Therapist provided facilitation and instruction of proper body mechanics, energy conservation, and fall prevention strategies during tasks listed above.  -Pt educated on role of OT, POC and discharge from acute OT  -Pt educated on importance of OOB activities with staff member assistance and sitting OOB majority of the day.   -Pt verbalized understanding. Pt expressed no further concerns/questions  -Whiteboard updated  Education:    Patient left sitting EOB with all lines intact, call button in reach and daughter  present    GOALS:   Multidisciplinary Problems     Occupational Therapy Goals     Not on file          Multidisciplinary Problems (Resolved)         Problem: Occupational Therapy Goal    Goal Priority Disciplines Outcome Interventions   Occupational Therapy Goal   (Resolved)     OT, PT/OT Met    Description:  Pt is currently performing ADLs, functional mobility and transfers at baseline. OT services are not recommended at this time and pt is safe to discharge home.                      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    Asthma     Depression     Hypertension     Morbid obesity     Opioid dependence 2017    Pneumonia due to Streptococcus pneumoniae 2017    Right lower lobe pneumonia 2017    Sepsis due to Streptococcus pneumoniae 2017    Sickle cell-beta thalassemia disease with pain     Trigeminal neuralgia        Past Surgical History:   Procedure Laterality Date     SECTION      TONSILLECTOMY      TUBAL LIGATION         Time Tracking:     OT Date of Treatment: 01/15/20  OT Start Time:   OT Stop Time: 1428  OT Total Time (min): 10 min    Billable Minutes:Evaluation 10    Aracelis Farris OT  1/15/2020

## 2020-01-15 NOTE — CARE UPDATE
Rapid Response Respiratory Therapy ETCO2 Check     Time of visit: 720    Code Status: Full Code   : 1980  Age: 39 y.o.  Weight:   Wt Readings from Last 1 Encounters:   01/10/20 (!) 147 kg (324 lb)     Sex: female  Race: Black or    Bed: 1108/1108 A:   MRN: [id number]    SITUATION     Evaluated patient for: ETCO2 Compliance     BACKGROUND     Patient has a past medical history of Abnormal Pap smear of cervix, 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, Asthma, Depression, Hypertension, Morbid obesity, Opioid dependence, Pneumonia due to Streptococcus pneumoniae, Right lower lobe pneumonia, Sepsis due to Streptococcus pneumoniae, Sickle cell-beta thalassemia disease with pain, and Trigeminal neuralgia.    ASSESSMENT     Is the ETCO2 monitor on? (Yes/No)  yes   Is the patient wearing a cannula? (Yes/No) yes  Are ETCO2 orders placed? (Yes/No) yes  Is the patient on a PCA pump? (Yes/No) yes  ETCO2 monitored: ETCO2 (mmHg): 48 mmHg  O2 Device/Concentration:   Pulse:  Respiratory rate:  Temperature: Temp: 99 °F (37.2 °C) BP: BP: (!) 193/86 SpO2:   Level of Consciousness: Level of Consciousness (AVPU): alert  Respiratory Effort: Respiratory Effort: Unlabored  Expansion/Accessory Muscle Usage: Expansion/Accessory Muscles/Retractions: expansion symmetric, no use of accessory muscles  All Lung Field Breath Sounds: All Lung Fields Breath Sounds: Anterior:, Posterior:, clear, equal bilaterally  Most recent blood gas: No results for input(s): PH, PCO2, PO2, HCO3, POCSATURATED, BE in the last 72 hours.  Ambu at bedside:      INTERVENTIONS/RECOMMENDATIONS           PHYSICIAN ESCALATION     Physician Escalation (Yes/No):      Care discussed with:   Discussed plan of care primary RT,      FOLLOW-UP     Please call back the Rapid Response RT, Annita Mchugh, RRT at x [phone] for any questions or concerns.

## 2020-01-15 NOTE — PLAN OF CARE
Problem: Occupational Therapy Goal  Goal: Occupational Therapy Goal  Description  Pt is currently performing ADLs, functional mobility and transfers at baseline. OT services are not recommended at this time and pt is safe to discharge home.     Outcome: Met     Evaluation and D/C complete- see note for details

## 2020-01-15 NOTE — ASSESSMENT & PLAN NOTE
40 yo female with sickle cell beta thalassemia presents with bilateral leg pain R>L for 2 days consistent with her typical pain crises. Her pain was unrelieved by her home percocet. She denies any symptoms of infection although she says she was sick 1-2 weeks ago with flu like symptoms but has recovered. She is having some shortness of breath also but denies chest pain or cough. On arrival she is stable and satting well on room air. Interestingly hemoglobin appears to be at baseline and retic count normal but patient does have significant iron and folate deficiency which could cause reticulocyte count to be low. I have low suspicion for acute chest at this time and she does not appear to have infection.  -LE U/S showed no DVT  -Infectious workup: UA without evidence of infection, CXR without focal consolidation. Follow up blood cultures, respiratory infection panel, influenza test    Plan:  - Pain control: Dilaudid pump at 0.2mg/ bolus allowed every 6 min (max of 2mg/hour)    Scheduled Oxycodone 20mg long acting q12h   - f/u MRI BL hips  - f/u hemoglobin electrophoresis   - Consulted hematology consults, appreciate recommendations   - continue acetaminophen to 1g q6h scheduled  - continue gabapentin to 800mg TID  - Daily reticulocyte count    - Folic acid supplementation

## 2020-01-15 NOTE — HPI
Ms. Malone is a 40 yo female with medical issues of sickle cell beta thalassemia, who presents with chief complaint of pain in her legs. She was admitted for sickle cell crisis. Hem/Onc consulted for worse than usually sickle cell crisis and primary team needing assist managing current crisis. She says the pain is similar to previous sickle cell crises trigger by the weather change. She says that she had a cold a week or two ago which she has recovered from.       She sees Dr. Montgomery for management of sickle cell and was most recently saw 12/19. She has been admitted 3 times in the last year for crisis (most recently in august 2019). It appears her baseline hemoglobin lies between 9-10. She has tried hydroxyurea before but did not tolerate/had a rash. Last time she saw Dr. Montgomery, she had low ferritin of 2 and was recommended to be given feraheme.      Up on admission, patient received IVF and pain management  includes home percocet and IV dilaudid for severe pain. Patient also c/o of menorrhagia much more so than her normal amount and was started on medroxyprogesterone for 7 days to help decrease her bleeding.  Patient's H/H continues to drop with increase in her retic count. Suspecting anemia 2/2 menstrual period which has improved with starting progesterone and folate, b12 supplementation. Patient was started on PCA pump for pain management and her current pain scale at 7.  Patient complaining of some fatigue, chill, CP, abdominal cramp and shortness of breath with exertion. She denies, cough, congestion, fevers, nausea, vomiting, diarrhea, and constipation. Patient wanting to know if she can have hysterectomy.

## 2020-01-15 NOTE — PLAN OF CARE
Problem: Physical Therapy Goal  Goal: Physical Therapy Goal  Description  Goals to be met by: 2020    Patient will increase functional independence with mobility by performin. Supine to sit with Modified [place].  2. Gait  x 250 feet with Supervision using no AD and no cues for pacing and energy conservation.   3. Ascend/descend 15 stair with bilateral Handrails and Supervision.   4. Sit to stand with modified independence.     Outcome: Ongoing, Progressing   Established plan of care. Created goals based on patient's current functional status.     Cortney Chairez, SPT

## 2020-01-15 NOTE — PROGRESS NOTES
[facility] Hospital Medicine  Progress Note    Patient Name: Nazanin Malone  MRN: [id number]  Patient Class: IP- Inpatient   Admission Date: 1/9/2020  Length of Stay: 6 days  Attending Physician: Johnny Bryant MD  Primary Care Provider: [facility] Medicine Team: [facility] HOSP MED 4 Waldemar Duffy MD    Subjective:     Principal Problem:Sickle cell disease, type S beta-zero thalassemia with crisis        HPI:  Ms. Malone is a 40 yo female with past medical history of sickle cell beta thalassemia, HTN, depression, and asthma who presents with chief complaint of pain in her legs.  She says that the pain started about 2 days ago and the pain is worse in her right leg than her left. The pain is associated with swelling and is located more in the bone rather than a particular joint. She says the pain is similar to previous sickle cell crises. She has tried her home percocet  mg q4 hours without significant relief. She usually uses this as a telling sign that she needs to come to the hospital. She is also complaining of some fatigue and shortness of breath with exertion. She denies chest pain, cough, congestion, fevers, chills, nausea, vomiting, diarrhea, constipation. She is currently on her menstrual period which is usually heavy. She says that she had a cold a week or two ago which she has recovered from. 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 She has not yet received her flu vaccination.    She follows with Dr. Montgomery for her sickle cell and was most recently saw on 12/27. She has been admitted 3 times in the last year for crisis(most recently in august 2019). Triggers for her usually include illness and changes in the weather. She says she has had acute chest in the past but cannot remember the last time. She cannot remember the last time she has received a blood transfusion. It appears her baseline hemoglobin lies between 9-10. She  has tried hydroxyurea before but did not tolerate/had a rash. Last time she saw Dr. Montgomery, she had low ferritin of 2 and was recommended to be given feraheme. She got one dose this past Tuesday and is scheduled to get another dose on Tuesday. It is suspected that low ferritin 2/2 heavy menstrual bleeding and it was recommended the patient follow up with OBGYN.    In the ED, the patient is afebrile, hypertensive in 180s and satting 97% on room air. Labs are notable for normal WBC, H/H 9.9/32.8, reticulocyte count 2.0, K 3 and rest of CMP within normal limits. Her UA had 1+ protein and >100 RBCs however patient on menstrual period. CXR obtained shows borderline cardiomegaly, coarsened interstitial lung markings, but no focal consolidation. She was given dilaudid 1 mg x 2, oral potassium, and 2L NS bolus.     Overview/Hospital Course:  Patient is admitted to [facility] for sickle cell crisis. She was started on IVF and pain regimen includes home percocet and IV dilaudid for severe pain.  Patient also c/o of menorrhagia much more so than her normal amount (she has heavy menstrual periods usually but this is much worse than normal.)   OBGYN consulted.  Patient was started on medroxyprogesterone for 7 days to help decrease her bleeding in this current setting.  Recommend patient follow up obgyn outpatient to discuss further options for AUB control including IUD vs hysterectomy.  TTE was preformed as patient c/o of dyspnea; it showed EF of 60% with some indeterminate amount of diastolic dysfunction.  Patient's pain appears well controlled with current pain regimen. Patient's H/H continues to drop with increase in her retic count. Suspecting anemia 2/2 menstrual period which has improved with starting progesterone and folate, b12 supplementation. Her pain is improving but still present. Improves with warm packs but declined topical pain relievers. Will decrease frequency of the patient's Dilaudid and schedule PO pain  medications. Patient continuing to have worsening pain similar to her presentation in the hospital.  Maximized multi-modal pain options by increasing tylenol and gabapentin dosage/ frequency as well as changing patient's oxycodone IR to longer acting.  On 1/14 patient continues to struggle with pain with current regimen, requiring frequent IV breakthrough pain control.  Changed patient to dilaudid PCA pump at 0.2mg/bolus every 6 min for a total of 2mg/hr.  On 1/15 patient went through 12 mg of IV dilaudid over 16 hours without relief of her lower extremity pain.  Difficult to control patient's pain with conservative measures of pain control and IVF.  Her hemoglobin has remained stable around ~9 (althought baseline closer to 10-11.)  Ordered MRI of BL hips to assess for ATN.  Consulted hematology for any additional suggestions in the management of this patient's sickle cell crisis.     Interval History:  Patient continues to have LE and BL hip pain unctonrolled on current regimen. See hospital course.     Review of Systems   Constitutional: Negative for chills and fever.   Respiratory: Negative for cough and shortness of breath.    Cardiovascular: Positive for leg swelling (and pain). Negative for chest pain.   Gastrointestinal: Negative for abdominal distention, abdominal pain, nausea and vomiting.   Musculoskeletal: Negative for arthralgias and back pain.        Hip pain BL   Neurological: Negative for weakness and headaches.     Objective:     Vital Signs (Most Recent):  Temp: 99 °F (37.2 °C) (01/15/20 0821)  Pulse: 80 (01/15/20 0821)  Resp: 16 (01/15/20 0821)  BP: (!) 193/86 (01/15/20 0821)  SpO2: 96 % (01/15/20 0821) Vital Signs (24h Range):  Temp:  [98.4 °F (36.9 °C)-99 °F (37.2 °C)] 99 °F (37.2 °C)  Pulse:  [72-90] 80  Resp:  [16-18] 16  SpO2:  [91 %-100 %] 96 %  BP: (141-193)/(78-99) 193/86     Weight: (!) 147 kg (324 lb)  Body mass index is 59.26 kg/m².  No intake or output data in the 24 hours ending  01/15/20 1130   Physical Exam   Constitutional: She is oriented to person, place, and time.   Patient is a morbidly obese BF who appears stated age, alert, and in NAD.    HENT:   Head: Normocephalic and atraumatic.   Eyes: Pupils are equal, round, and reactive to light. EOM are normal.   Neck: Normal range of motion. Neck supple.   Cardiovascular: Normal rate, regular rhythm and normal heart sounds.   Pulmonary/Chest: Effort normal and breath sounds normal. No stridor. No respiratory distress. She has no wheezes.   Abdominal: Soft. Bowel sounds are normal. She exhibits no distension. There is no tenderness.   Musculoskeletal: Normal range of motion. She exhibits no edema.   JOSE LUIS hose present on BL LE.  Tenderness to palpation of LE BL   Neurological: She is alert and oriented to person, place, and time.   Skin: Skin is warm and dry. Capillary refill takes less than 2 seconds.   Psychiatric: She has a normal mood and affect.       Significant Labs:   CBC:   Recent Labs   Lab 01/14/20  0336 01/14/20  1301 01/15/20  0714   WBC 6.73 6.60 6.88   HGB 8.7* 9.1* 9.3*   HCT 29.3* 30.9* 31.5*    157 176     CMP:   Recent Labs   Lab 01/14/20  0335 01/15/20  0714    138   K 4.0 4.1    102   CO2 26 28   GLU 96 109   BUN 9 10   CREATININE 0.7 0.8   CALCIUM 8.7 8.7   PROT 6.9 7.4   ALBUMIN 3.2* 3.5   BILITOT 0.3 0.3   ALKPHOS 79 82   AST 22 21   ALT 17 17   ANIONGAP 10 8   EGFRNONAA >60.0 >60.0       Significant Imaging: I have reviewed and interpreted all pertinent imaging results/findings within the past 24 hours.      Assessment/Plan:      * Sickle cell disease, type S beta-zero thalassemia with crisis  38 yo female with sickle cell beta thalassemia presents with bilateral leg pain R>L for 2 days consistent with her typical pain crises. Her pain was unrelieved by her home percocet. She denies any symptoms of infection although she says she was sick 1-2 weeks ago with flu like symptoms but has recovered. She is  having some shortness of breath also but denies chest pain or cough. On arrival she is stable and satting well on room air. Interestingly hemoglobin appears to be at baseline and retic count normal but patient does have significant iron and folate deficiency which could cause reticulocyte count to be low. I have low suspicion for acute chest at this time and she does not appear to have infection.  -LE U/S showed no DVT  -Infectious workup: UA without evidence of infection, CXR without focal consolidation. Follow up blood cultures, respiratory infection panel, influenza test    Plan:  - Pain control: Dilaudid pump at 0.2mg/ bolus allowed every 6 min (max of 2mg/hour)    Scheduled Oxycodone 20mg long acting q12h   - f/u MRI BL hips  - f/u hemoglobin electrophoresis   - Consulted hematology consults, appreciate recommendations   - continue acetaminophen to 1g q6h scheduled  - continue gabapentin to 800mg TID  - Daily reticulocyte count    - Folic acid supplementation    Sickle cell pain crisis  See SCD      Menorrhagia with regular cycle  See SAMUEL acute blood loss      Folate deficiency  Levels were 2.9 about 2 weeks ago. Patient not on supplementation at home    Plan:  -continue start folic acid 1 mg daily      Shortness of breath  Patient has a history of intermittent asthma for which she takes symbicort inhaler daily with albuterol or duonebs as needed. She has been experiencing 4 day history of shortness of breath with exertion, improved with rest, with associated leg swelling. No orthopnea or PND. Differential includes asthma exacerbation vs HF vs anemia vs deconditioning vs PE. She has used her inhaler without relief. Her las echo was in August and showed low normal EF 50-55% and concentric LVH.   -TTE: 60% EF. Concentric LVH. Indeterminate amount of diastolic dysfunction.    Plan:   -Continue controller inhaler while inpatient  -Duonebs as needed for shortness of breath  RESOLVED        Iron deficiency anemia due  to chronic blood loss  Patient has a history of menorrhagia and most recently had a ferritin of 2. She is s/p 1 dose of feraheme on Tuesday and is supposed to receive another dose next Tuesday. She has tried oral iron supplementation in the past but remembers not being able to tolerate . Patient states that she chronically has heavy menstrual periods; however, this current cycle is much heavier than usual.   -Pelvic U/s showed enlarged uterus with normal endometrium     Plan:  -OBGYN consulted for AUB, appreciate recs  -continue medroxyprogesterone x7 days to help decrease her acute bleeding.   -Trend CBC daily  -Outpatient f/u with OBGYN        Trigeminal neuralgia  Home medication: Carbamazepine 800 mg BID    Plan:  -Continue home Carbamazepine      Essential hypertension  Home medication: amlodipine 10 mg qd, Coreg 25 mg BID(patient has actually been taking incorrectly 12.5 BID), and HCTZ 25 mg daily    Plan:  -Continue home medications   -Ensure adequate pain control before giving PRN hydralazine  -Patient continues to have fluctuating levels of BP from SBP of 200 to 130.  Likely associated with pain and pain relief with PRN medication.  Will continue to monitor.        VTE Risk Mitigation (From admission, onward)         Ordered     enoxaparin injection 40 mg  Every 12 hours      01/14/20 1047     Place sequential compression device  Until discontinued      01/10/20 0219     IP VTE HIGH RISK PATIENT  Once      01/10/20 0015                      Waldemar Duffy MD  Department of Hospital Medicine   [facility]

## 2020-01-16 LAB
ALBUMIN SERPL BCP-MCNC: 3.4 G/DL (ref 3.5–5.2)
ALP SERPL-CCNC: 83 U/L (ref 55–135)
ALT SERPL W/O P-5'-P-CCNC: 35 U/L (ref 10–44)
ANION GAP SERPL CALC-SCNC: 10 MMOL/L (ref 8–16)
AST SERPL-CCNC: 43 U/L (ref 10–40)
BASOPHILS # BLD AUTO: 0.01 K/UL (ref 0–0.2)
BASOPHILS NFR BLD: 0.2 % (ref 0–1.9)
BILIRUB DIRECT SERPL-MCNC: 0.2 MG/DL (ref 0.1–0.3)
BILIRUB SERPL-MCNC: 0.3 MG/DL (ref 0.1–1)
BUN SERPL-MCNC: 13 MG/DL (ref 6–20)
CALCIUM SERPL-MCNC: 8.5 MG/DL (ref 8.7–10.5)
CHLORIDE SERPL-SCNC: 101 MMOL/L (ref 95–110)
CO2 SERPL-SCNC: 26 MMOL/L (ref 23–29)
CREAT SERPL-MCNC: 0.9 MG/DL (ref 0.5–1.4)
D DIMER PPP IA.FEU-MCNC: 1.37 MG/L FEU
DIFFERENTIAL METHOD: ABNORMAL
EOSINOPHIL # BLD AUTO: 0 K/UL (ref 0–0.5)
EOSINOPHIL NFR BLD: 0 % (ref 0–8)
ERYTHROCYTE [DISTWIDTH] IN BLOOD BY AUTOMATED COUNT: 24.1 % (ref 11.5–14.5)
EST. GFR  (AFRICAN AMERICAN): >60 ML/MIN/1.73 M^2
EST. GFR  (NON AFRICAN AMERICAN): >60 ML/MIN/1.73 M^2
GLUCOSE SERPL-MCNC: 131 MG/DL (ref 70–110)
HCT VFR BLD AUTO: 30.5 % (ref 37–48.5)
HGB BLD-MCNC: 9.1 G/DL (ref 12–16)
IMM GRANULOCYTES # BLD AUTO: 0.02 K/UL (ref 0–0.04)
IMM GRANULOCYTES NFR BLD AUTO: 0.3 % (ref 0–0.5)
LYMPHOCYTES # BLD AUTO: 3.1 K/UL (ref 1–4.8)
LYMPHOCYTES NFR BLD: 52.5 % (ref 18–48)
MAGNESIUM SERPL-MCNC: 2.1 MG/DL (ref 1.6–2.6)
MCH RBC QN AUTO: 20.4 PG (ref 27–31)
MCHC RBC AUTO-ENTMCNC: 29.8 G/DL (ref 32–36)
MCV RBC AUTO: 68 FL (ref 82–98)
MONOCYTES # BLD AUTO: 0.4 K/UL (ref 0.3–1)
MONOCYTES NFR BLD: 6.9 % (ref 4–15)
NEUTROPHILS # BLD AUTO: 2.4 K/UL (ref 1.8–7.7)
NEUTROPHILS NFR BLD: 40.1 % (ref 38–73)
NRBC BLD-RTO: 10 /100 WBC
PHOSPHATE SERPL-MCNC: 3.6 MG/DL (ref 2.7–4.5)
PLATELET # BLD AUTO: 171 K/UL (ref 150–350)
PMV BLD AUTO: 9.2 FL (ref 9.2–12.9)
POTASSIUM SERPL-SCNC: 3.9 MMOL/L (ref 3.5–5.1)
PROT SERPL-MCNC: 7.4 G/DL (ref 6–8.4)
RBC # BLD AUTO: 4.46 M/UL (ref 4–5.4)
RETICS/RBC NFR AUTO: 4.7 % (ref 0.5–2.5)
SODIUM SERPL-SCNC: 137 MMOL/L (ref 136–145)
TROPONIN I SERPL DL<=0.01 NG/ML-MCNC: <0.006 NG/ML (ref 0–0.03)
WBC # BLD AUTO: 5.96 K/UL (ref 3.9–12.7)

## 2020-01-16 PROCEDURE — 25000003 PHARM REV CODE 250: Performed by: STUDENT IN AN ORGANIZED HEALTH CARE EDUCATION/TRAINING PROGRAM

## 2020-01-16 PROCEDURE — 63600175 PHARM REV CODE 636 W HCPCS: Performed by: STUDENT IN AN ORGANIZED HEALTH CARE EDUCATION/TRAINING PROGRAM

## 2020-01-16 PROCEDURE — 80076 HEPATIC FUNCTION PANEL: CPT

## 2020-01-16 PROCEDURE — 85045 AUTOMATED RETICULOCYTE COUNT: CPT

## 2020-01-16 PROCEDURE — 99233 SBSQ HOSP IP/OBS HIGH 50: CPT | Mod: ,,, | Performed by: HOSPITALIST

## 2020-01-16 PROCEDURE — 84484 ASSAY OF TROPONIN QUANT: CPT

## 2020-01-16 PROCEDURE — 85025 COMPLETE CBC W/AUTO DIFF WBC: CPT

## 2020-01-16 PROCEDURE — 63600175 PHARM REV CODE 636 W HCPCS: Performed by: HOSPITALIST

## 2020-01-16 PROCEDURE — 83735 ASSAY OF MAGNESIUM: CPT

## 2020-01-16 PROCEDURE — 80048 BASIC METABOLIC PNL TOTAL CA: CPT

## 2020-01-16 PROCEDURE — 11000001 HC ACUTE MED/SURG PRIVATE ROOM

## 2020-01-16 PROCEDURE — 25000003 PHARM REV CODE 250: Performed by: HOSPITALIST

## 2020-01-16 PROCEDURE — 93010 ELECTROCARDIOGRAM REPORT: CPT | Mod: ,,, | Performed by: INTERNAL MEDICINE

## 2020-01-16 PROCEDURE — 99233 SBSQ HOSP IP/OBS HIGH 50: CPT | Mod: ,,, | Performed by: INTERNAL MEDICINE

## 2020-01-16 PROCEDURE — 99233 PR SUBSEQUENT HOSPITAL CARE,LEVL III: ICD-10-PCS | Mod: ,,, | Performed by: HOSPITALIST

## 2020-01-16 PROCEDURE — 36415 COLL VENOUS BLD VENIPUNCTURE: CPT

## 2020-01-16 PROCEDURE — 93010 EKG 12-LEAD: ICD-10-PCS | Mod: ,,, | Performed by: INTERNAL MEDICINE

## 2020-01-16 PROCEDURE — 99233 PR SUBSEQUENT HOSPITAL CARE,LEVL III: ICD-10-PCS | Mod: ,,, | Performed by: INTERNAL MEDICINE

## 2020-01-16 PROCEDURE — 85379 FIBRIN DEGRADATION QUANT: CPT

## 2020-01-16 PROCEDURE — 93005 ELECTROCARDIOGRAM TRACING: CPT

## 2020-01-16 PROCEDURE — 84100 ASSAY OF PHOSPHORUS: CPT

## 2020-01-16 RX ORDER — OXYCODONE HCL 10 MG/1
10 TABLET, FILM COATED, EXTENDED RELEASE ORAL EVERY 12 HOURS
Status: DISCONTINUED | OUTPATIENT
Start: 2020-01-16 | End: 2020-01-19 | Stop reason: HOSPADM

## 2020-01-16 RX ORDER — HYDROXYUREA 500 MG/1
500 CAPSULE ORAL 2 TIMES DAILY
Status: DISCONTINUED | OUTPATIENT
Start: 2020-01-16 | End: 2020-01-19 | Stop reason: HOSPADM

## 2020-01-16 RX ORDER — ALPRAZOLAM 0.5 MG/1
1 TABLET ORAL 2 TIMES DAILY PRN
Status: DISCONTINUED | OUTPATIENT
Start: 2020-01-16 | End: 2020-01-19 | Stop reason: HOSPADM

## 2020-01-16 RX ORDER — TALC
6 POWDER (GRAM) TOPICAL NIGHTLY
Status: DISCONTINUED | OUTPATIENT
Start: 2020-01-16 | End: 2020-01-19 | Stop reason: HOSPADM

## 2020-01-16 RX ORDER — HYDRALAZINE HYDROCHLORIDE 25 MG/1
25 TABLET, FILM COATED ORAL EVERY 8 HOURS PRN
Status: DISCONTINUED | OUTPATIENT
Start: 2020-01-16 | End: 2020-01-19 | Stop reason: HOSPADM

## 2020-01-16 RX ORDER — HYDROMORPHONE HYDROCHLORIDE 1 MG/ML
1 INJECTION, SOLUTION INTRAMUSCULAR; INTRAVENOUS; SUBCUTANEOUS EVERY 8 HOURS PRN
Status: DISCONTINUED | OUTPATIENT
Start: 2020-01-16 | End: 2020-01-17

## 2020-01-16 RX ADMIN — CARBAMAZEPINE 400 MG: 200 TABLET ORAL at 09:01

## 2020-01-16 RX ADMIN — MEDROXYPROGESTERONE ACETATE 20 MG: 10 TABLET ORAL at 04:01

## 2020-01-16 RX ADMIN — GABAPENTIN 800 MG: 400 CAPSULE ORAL at 04:01

## 2020-01-16 RX ADMIN — HYDROCHLOROTHIAZIDE 25 MG: 12.5 TABLET ORAL at 08:01

## 2020-01-16 RX ADMIN — FLUOXETINE 40 MG: 20 CAPSULE ORAL at 08:01

## 2020-01-16 RX ADMIN — ACETAMINOPHEN 1000 MG: 500 TABLET ORAL at 08:01

## 2020-01-16 RX ADMIN — MEDROXYPROGESTERONE ACETATE 20 MG: 10 TABLET ORAL at 09:01

## 2020-01-16 RX ADMIN — GABAPENTIN 800 MG: 400 CAPSULE ORAL at 08:01

## 2020-01-16 RX ADMIN — FOLIC ACID 1 MG: 1 TABLET ORAL at 08:01

## 2020-01-16 RX ADMIN — Medication: at 02:01

## 2020-01-16 RX ADMIN — SODIUM CHLORIDE: 0.45 INJECTION, SOLUTION INTRAVENOUS at 05:01

## 2020-01-16 RX ADMIN — HYDRALAZINE HYDROCHLORIDE 25 MG: 25 TABLET, FILM COATED ORAL at 12:01

## 2020-01-16 RX ADMIN — ENOXAPARIN SODIUM 40 MG: 100 INJECTION SUBCUTANEOUS at 08:01

## 2020-01-16 RX ADMIN — OXYCODONE HYDROCHLORIDE 20 MG: 20 TABLET, FILM COATED, EXTENDED RELEASE ORAL at 08:01

## 2020-01-16 RX ADMIN — OXYCODONE HYDROCHLORIDE 10 MG: 10 TABLET, FILM COATED, EXTENDED RELEASE ORAL at 09:01

## 2020-01-16 RX ADMIN — ENOXAPARIN SODIUM 40 MG: 100 INJECTION SUBCUTANEOUS at 09:01

## 2020-01-16 RX ADMIN — CARVEDILOL 25 MG: 25 TABLET, FILM COATED ORAL at 08:01

## 2020-01-16 RX ADMIN — ACETAMINOPHEN 1000 MG: 500 TABLET ORAL at 04:01

## 2020-01-16 RX ADMIN — HYDROXYUREA 500 MG: 500 CAPSULE ORAL at 09:01

## 2020-01-16 RX ADMIN — SENNOSIDES AND DOCUSATE SODIUM 2 TABLET: 8.6; 5 TABLET ORAL at 09:01

## 2020-01-16 RX ADMIN — GABAPENTIN 800 MG: 400 CAPSULE ORAL at 09:01

## 2020-01-16 RX ADMIN — ALPRAZOLAM 1 MG: 0.5 TABLET ORAL at 04:01

## 2020-01-16 RX ADMIN — AMLODIPINE BESYLATE 10 MG: 10 TABLET ORAL at 08:01

## 2020-01-16 RX ADMIN — LACTULOSE 10 G: 20 SOLUTION ORAL at 09:01

## 2020-01-16 RX ADMIN — Medication 6 MG: at 09:01

## 2020-01-16 RX ADMIN — POLYETHYLENE GLYCOL 3350 17 G: 17 POWDER, FOR SOLUTION ORAL at 09:01

## 2020-01-16 RX ADMIN — ACETAMINOPHEN 1000 MG: 500 TABLET ORAL at 09:01

## 2020-01-16 RX ADMIN — LACTULOSE 10 G: 20 SOLUTION ORAL at 08:01

## 2020-01-16 RX ADMIN — Medication: at 07:01

## 2020-01-16 RX ADMIN — CARVEDILOL 25 MG: 25 TABLET, FILM COATED ORAL at 09:01

## 2020-01-16 RX ADMIN — CARBAMAZEPINE 400 MG: 200 TABLET ORAL at 08:01

## 2020-01-16 RX ADMIN — SENNOSIDES AND DOCUSATE SODIUM 2 TABLET: 8.6; 5 TABLET ORAL at 08:01

## 2020-01-16 NOTE — PLAN OF CARE
Pt is not medically ready for discharge.    CM will follow-up and assist team.    Laura Sanchez RN  [id number]

## 2020-01-16 NOTE — PROGRESS NOTES
[facility]  Hematology/Oncology  Progress Note    Patient Name: Nazanin Malone  Admission Date: 1/9/2020  Hospital Length of Stay: 7 days  Code Status: Full Code     Subjective:     HPI:  Ms. Malone is a 38 yo female with medical issues of sickle cell beta thalassemia, who presents with chief complaint of pain in her legs. She was admitted for sickle cell crisis. Hem/Onc consulted for worse than usually sickle cell crisis and primary team needing assist managing current crisis. She says the pain is similar to previous sickle cell crises trigger by the weather change. She says that she had a cold a week or two ago which she has recovered from.       She sees Dr. Montgomery for management of sickle cell and was most recently saw 12/19. She has been admitted 3 times in the last year for crisis (most recently in august 2019). It appears her baseline hemoglobin lies between 9-10. She has tried hydroxyurea before but did not tolerate/had a rash. Last time she saw Dr. Montgomery, she had low ferritin of 2 and was recommended to be given feraheme.      Up on admission, patient received IVF and pain management  includes home percocet and IV dilaudid for severe pain. Patient also c/o of menorrhagia much more so than her normal amount and was started on medroxyprogesterone for 7 days to help decrease her bleeding.  Patient's H/H continues to drop with increase in her retic count. Suspecting anemia 2/2 menstrual period which has improved with starting progesterone and folate, b12 supplementation. Patient was started on PCA pump for pain management and her current pain scale at 7.  Patient complaining of some fatigue, chill, CP, abdominal cramp and shortness of breath with exertion. She denies, cough, congestion, fevers, nausea, vomiting, diarrhea, and constipation. Patient wanting to know if she can have hysterectomy.     Interval History: Patient's pain improved from previous day on PCA pump. Patient  said her pain scale is 5/10 currently and appeared comfortable. She did not have any acute event over night. Hgb S electrophoresis returned with 67%.     Oncology Treatment Plan:   [No treatment plan]    Medications:  Continuous Infusions:   sodium chloride 0.45% 75 mL/hr at 01/15/20 1318    hydromorphone in 0.9 % NaCl 6 mg/30 ml       Scheduled Meds:   acetaminophen  1,000 mg Oral Q6H    amLODIPine  10 mg Oral Daily    carBAMazepine  400 mg Oral BID    carvedilol  25 mg Oral BID    enoxaparin  40 mg Subcutaneous Q12H    FLUoxetine  40 mg Oral Daily    fluticasone furoate-vilanterol  1 puff Inhalation Daily    folic acid  1 mg Oral Daily    gabapentin  800 mg Oral TID    hydroCHLOROthiazide  25 mg Oral Daily    lactulose  10 g Oral BID    medroxyPROGESTERone  20 mg Oral TID    oxyCODONE  10 mg Oral Q12H    polyethylene glycol  17 g Oral Daily    senna-docusate 8.6-50 mg  2 tablet Oral BID     PRN Meds:albuterol-ipratropium, diphenhydrAMINE, hydrALAZINE, influenza, melatonin, naloxone, ondansetron, prochlorperazine, sodium chloride 0.9%     Review of Systems   Constitutional: Positive for fatigue. Negative for activity change, appetite change, chills, diaphoresis and fever.   HENT: Negative for congestion and sore throat.    Eyes: Negative for visual disturbance.   Respiratory: Negative for cough, choking and shortness of breath.    Cardiovascular: Positive for chest pain. Negative for palpitations and leg swelling.   Gastrointestinal: Negative for abdominal distention, abdominal pain, anal bleeding, blood in stool, constipation, diarrhea, nausea and vomiting.   Genitourinary: Negative for difficulty urinating.   Musculoskeletal: Positive for arthralgias and myalgias. Negative for neck pain.   Skin: Negative for color change.   Neurological: Negative for dizziness, light-headedness and headaches.   Psychiatric/Behavioral: Negative for agitation, behavioral problems and confusion.     Objective:      Vital Signs (Most Recent):  Temp: 98.4 °F (36.9 °C) (01/16/20 0852)  Pulse: 89 (01/16/20 0852)  Resp: 18 (01/16/20 0852)  BP: (!) 176/91 (01/16/20 0852)  SpO2: 97 % (01/16/20 0852) Vital Signs (24h Range):  Temp:  [98.3 °F (36.8 °C)-99 °F (37.2 °C)] 98.4 °F (36.9 °C)  Pulse:  [69-89] 89  Resp:  [17-18] 18  SpO2:  [95 %-100 %] 97 %  BP: (154-184)/() 176/91     Weight: (!) 147 kg (324 lb)  Body mass index is 59.26 kg/m².  Body surface area is 2.54 meters squared.      Intake/Output Summary (Last 24 hours) at 1/16/2020 1035  Last data filed at 1/16/2020 0900  Gross per 24 hour   Intake 240 ml   Output --   Net 240 ml       Physical Exam   Constitutional: She is oriented to person, place, and time. She appears well-developed and well-nourished. No distress.   Patient is a morbidly obes   HENT:   Head: Normocephalic and atraumatic.   Mouth/Throat: Oropharynx is clear and moist.   Eyes: Pupils are equal, round, and reactive to light. EOM are normal.   Neck: Normal range of motion. Neck supple.   Cardiovascular: Normal rate, regular rhythm, normal heart sounds and intact distal pulses.   Pulmonary/Chest: Effort normal and breath sounds normal. No stridor. No respiratory distress. She has no wheezes.   Abdominal: Soft. Bowel sounds are normal. She exhibits no distension. There is no tenderness.   Musculoskeletal: Normal range of motion. She exhibits tenderness (Bilateral lower extremity ). She exhibits no edema or deformity.   Lymphadenopathy:     She has no cervical adenopathy.   Neurological: She is alert and oriented to person, place, and time.   Skin: Skin is warm and dry. Capillary refill takes less than 2 seconds.   Psychiatric: She has a normal mood and affect.       Significant Labs:   CBC:   Recent Labs   Lab 01/14/20  1301 01/15/20  0714 01/16/20  0527   WBC 6.60 6.88 5.96   HGB 9.1* 9.3* 9.1*   HCT 30.9* 31.5* 30.5*    176 171   , CMP:   Recent Labs   Lab 01/15/20  0714 01/16/20  0527     137   K 4.1 3.9    101   CO2 28 26    131*   BUN 10 13   CREATININE 0.8 0.9   CALCIUM 8.7 8.5*   PROT 7.4 7.4   ALBUMIN 3.5 3.4*   BILITOT 0.3 0.3   ALKPHOS 82 83   AST 21 43*   ALT 17 35   ANIONGAP 8 10   EGFRNONAA >60.0 >60.0   , Reticulocytes:   Recent Labs   Lab 01/15/20  0714 01/16/20  0527   RETIC 5.3* 4.7*    and All pertinent labs from the last 24 hours have been reviewed.    Diagnostic Results:  I have reviewed all pertinent imaging results/findings within the past 24 hours.    Assessment/Plan:     * Sickle cell disease, type S beta-zero thalassemia with crisis  Ms Kira is 39y.o female with medical issue of sickle cell disease with beta thalassemia who is admitted for acute sickle cell crisis most likely brought on by her menorrhagia and weather change. Patient receiving IVF and on PCA pump. No significant increase in sickle cell or schistocytes seen in peripheral smear. Hemoglobin S electrophoresis is 67%g. Patient recently finished her menstrual cycle which she did not need transfusion. Patient consulted hem/onc for further management of sickle cell crisis which is lasting longer than her previous episodes. Patient's pain is better controlled with PCA pump and on IVF. Patient's Hgb nearly at baseline and not having any acute chest.      Recommendation  - Agree with using PCA pump to control pain and wean down to PO pain med when tolerated.   - Continue IVF   - Hgb S electrophoresis result at 67%, but patient clinically doing better. Will hold off on transfusion exchange.   - Start patient on hydroxyurea 500mg BID. Patient said she gets rash from it but discussed the benefit and she is willing to take the medication.       DESI Sanchez MD  Hematology/Oncology  [facility]

## 2020-01-16 NOTE — SUBJECTIVE & OBJECTIVE
Interval History: Patient's pain improved from previous day on PCA pump. Patient said her pain scale is 5/10 currently and appeared comfortable. She did not have any acute event over night. Hgb S electrophoresis returned with 67%.     Oncology Treatment Plan:   [No treatment plan]    Medications:  Continuous Infusions:   sodium chloride 0.45% 75 mL/hr at 01/15/20 1318    hydromorphone in 0.9 % NaCl 6 mg/30 ml       Scheduled Meds:   acetaminophen  1,000 mg Oral Q6H    amLODIPine  10 mg Oral Daily    carBAMazepine  400 mg Oral BID    carvedilol  25 mg Oral BID    enoxaparin  40 mg Subcutaneous Q12H    FLUoxetine  40 mg Oral Daily    fluticasone furoate-vilanterol  1 puff Inhalation Daily    folic acid  1 mg Oral Daily    gabapentin  800 mg Oral TID    hydroCHLOROthiazide  25 mg Oral Daily    lactulose  10 g Oral BID    medroxyPROGESTERone  20 mg Oral TID    oxyCODONE  10 mg Oral Q12H    polyethylene glycol  17 g Oral Daily    senna-docusate 8.6-50 mg  2 tablet Oral BID     PRN Meds:albuterol-ipratropium, diphenhydrAMINE, hydrALAZINE, influenza, melatonin, naloxone, ondansetron, prochlorperazine, sodium chloride 0.9%     Review of Systems   Constitutional: Positive for fatigue. Negative for activity change, appetite change, chills, diaphoresis and fever.   HENT: Negative for congestion and sore throat.    Eyes: Negative for visual disturbance.   Respiratory: Negative for cough, choking and shortness of breath.    Cardiovascular: Positive for chest pain. Negative for palpitations and leg swelling.   Gastrointestinal: Negative for abdominal distention, abdominal pain, anal bleeding, blood in stool, constipation, diarrhea, nausea and vomiting.   Genitourinary: Negative for difficulty urinating.   Musculoskeletal: Positive for arthralgias and myalgias. Negative for neck pain.   Skin: Negative for color change.   Neurological: Negative for dizziness, light-headedness and headaches.    Psychiatric/Behavioral: Negative for agitation, behavioral problems and confusion.     Objective:     Vital Signs (Most Recent):  Temp: 98.4 °F (36.9 °C) (01/16/20 0852)  Pulse: 89 (01/16/20 0852)  Resp: 18 (01/16/20 0852)  BP: (!) 176/91 (01/16/20 0852)  SpO2: 97 % (01/16/20 0852) Vital Signs (24h Range):  Temp:  [98.3 °F (36.8 °C)-99 °F (37.2 °C)] 98.4 °F (36.9 °C)  Pulse:  [69-89] 89  Resp:  [17-18] 18  SpO2:  [95 %-100 %] 97 %  BP: (154-184)/() 176/91     Weight: (!) 147 kg (324 lb)  Body mass index is 59.26 kg/m².  Body surface area is 2.54 meters squared.      Intake/Output Summary (Last 24 hours) at 1/16/2020 1035  Last data filed at 1/16/2020 0900  Gross per 24 hour   Intake 240 ml   Output --   Net 240 ml       Physical Exam   Constitutional: She is oriented to person, place, and time. She appears well-developed and well-nourished. No distress.   Patient is a morbidly obes   HENT:   Head: Normocephalic and atraumatic.   Mouth/Throat: Oropharynx is clear and moist.   Eyes: Pupils are equal, round, and reactive to light. EOM are normal.   Neck: Normal range of motion. Neck supple.   Cardiovascular: Normal rate, regular rhythm, normal heart sounds and intact distal pulses.   Pulmonary/Chest: Effort normal and breath sounds normal. No stridor. No respiratory distress. She has no wheezes.   Abdominal: Soft. Bowel sounds are normal. She exhibits no distension. There is no tenderness.   Musculoskeletal: Normal range of motion. She exhibits tenderness (Bilateral lower extremity ). She exhibits no edema or deformity.   Lymphadenopathy:     She has no cervical adenopathy.   Neurological: She is alert and oriented to person, place, and time.   Skin: Skin is warm and dry. Capillary refill takes less than 2 seconds.   Psychiatric: She has a normal mood and affect.       Significant Labs:   CBC:   Recent Labs   Lab 01/14/20  1301 01/15/20  0714 01/16/20  0527   WBC 6.60 6.88 5.96   HGB 9.1* 9.3* 9.1*   HCT 30.9*  31.5* 30.5*    176 171   , CMP:   Recent Labs   Lab 01/15/20  0714 01/16/20  0527    137   K 4.1 3.9    101   CO2 28 26    131*   BUN 10 13   CREATININE 0.8 0.9   CALCIUM 8.7 8.5*   PROT 7.4 7.4   ALBUMIN 3.5 3.4*   BILITOT 0.3 0.3   ALKPHOS 82 83   AST 21 43*   ALT 17 35   ANIONGAP 8 10   EGFRNONAA >60.0 >60.0   , Reticulocytes:   Recent Labs   Lab 01/15/20  0714 01/16/20  0527   RETIC 5.3* 4.7*    and All pertinent labs from the last 24 hours have been reviewed.    Diagnostic Results:  I have reviewed all pertinent imaging results/findings within the past 24 hours.

## 2020-01-16 NOTE — SUBJECTIVE & OBJECTIVE
Interval History: Patient still endorsing persistent pain that is moderately controlled with the PCA pump; however pain appears to be improving from day prior.     -will attempt to wean Oxycodone from 20 to 10 mg long acting BID.   -plan to transition to orals tomorrow and d/c the PCA pump    Review of Systems   Constitutional: Negative for chills and fever.   Respiratory: Negative for cough and shortness of breath.    Cardiovascular: Positive for leg swelling (and pain). Negative for chest pain.   Gastrointestinal: Negative for abdominal distention, abdominal pain, nausea and vomiting.   Musculoskeletal: Negative for arthralgias and back pain.        Hip pain BL   Neurological: Negative for weakness and headaches.     Objective:     Vital Signs (Most Recent):  Temp: 98.4 °F (36.9 °C) (01/16/20 0852)  Pulse: 89 (01/16/20 0852)  Resp: 18 (01/16/20 0852)  BP: (!) 176/91 (01/16/20 0852)  SpO2: 97 % (01/16/20 0852) Vital Signs (24h Range):  Temp:  [98.3 °F (36.8 °C)-99 °F (37.2 °C)] 98.4 °F (36.9 °C)  Pulse:  [69-89] 89  Resp:  [17-18] 18  SpO2:  [95 %-100 %] 97 %  BP: (154-184)/() 176/91     Weight: (!) 147 kg (324 lb)  Body mass index is 59.26 kg/m².    Intake/Output Summary (Last 24 hours) at 1/16/2020 1008  Last data filed at 1/16/2020 0900  Gross per 24 hour   Intake 240 ml   Output --   Net 240 ml      Physical Exam   Constitutional: She is oriented to person, place, and time.   Patient is a morbidly obese BF who appears stated age, alert, and in NAD.    HENT:   Head: Normocephalic and atraumatic.   Eyes: Pupils are equal, round, and reactive to light. EOM are normal.   Neck: Normal range of motion. Neck supple.   Cardiovascular: Normal rate, regular rhythm and normal heart sounds.   Pulmonary/Chest: Effort normal and breath sounds normal. No stridor. No respiratory distress. She has no wheezes.   Abdominal: Soft. Bowel sounds are normal. She exhibits no distension. There is no tenderness.   Musculoskeletal:  Normal range of motion. She exhibits no edema.   JOSE LUIS hose present on BL LE.  Tenderness to palpation of LE BL   Neurological: She is alert and oriented to person, place, and time.   Skin: Skin is warm and dry. Capillary refill takes less than 2 seconds.   Psychiatric: She has a normal mood and affect.       Significant Labs:   CBC:   Recent Labs   Lab 01/14/20  1301 01/15/20  0714 01/16/20  0527   WBC 6.60 6.88 5.96   HGB 9.1* 9.3* 9.1*   HCT 30.9* 31.5* 30.5*    176 171     CMP:   Recent Labs   Lab 01/15/20  0714 01/16/20  0527    137   K 4.1 3.9    101   CO2 28 26    131*   BUN 10 13   CREATININE 0.8 0.9   CALCIUM 8.7 8.5*   PROT 7.4 7.4   ALBUMIN 3.5 3.4*   BILITOT 0.3 0.3   ALKPHOS 82 83   AST 21 43*   ALT 17 35   ANIONGAP 8 10   EGFRNONAA >60.0 >60.0       Significant Imaging: I have reviewed and interpreted all pertinent imaging results/findings within the past 24 hours.

## 2020-01-16 NOTE — ASSESSMENT & PLAN NOTE
Ms Malone is 39y.o female with medical issue of sickle cell disease with beta thalassemia who is admitted for acute sickle cell crisis most likely brought on by her menorrhagia and weather change. Patient receiving IVF and on PCA pump. No significant increase in sickle cell or schistocytes seen in peripheral smear. Hemoglobin S electrophoresis is 67%g. Patient recently finished her menstrual cycle which she did not need transfusion. Patient consulted hem/onc for further management of sickle cell crisis which is lasting longer than her previous episodes. Patient's pain is better controlled with PCA pump and on IVF. Patient's Hgb nearly at baseline and not having any acute chest.      Recommendation  - Agree with using PCA pump to control pain and wean down to PO pain med when tolerated.   - Continue IVF   - Hgb S electrophoresis result at 67%, but patient clinically doing better. Will hold off on transfusion exchange.   - Start patient on hydroxyurea 500mg BID. Patient said she gets rash from it but discussed the benefit and she is willing to take the medication.

## 2020-01-16 NOTE — PLAN OF CARE
Pt. Encourage to express feeling about her chronic illness and struggles with sickle cell, explore with her ways to her control her pain,pt. Verbalized understanding of information given

## 2020-01-16 NOTE — PROGRESS NOTES
[facility] Hospital Medicine  Progress Note    Patient Name: Nazanin Malone  MRN: [id number]  Patient Class: IP- Inpatient   Admission Date: 1/9/2020  Length of Stay: 7 days  Attending Physician: Johnny Bryant MD  Primary Care Provider: [facility] Medicine Team: [facility] 4 Waldemar Duffy MD    Subjective:     Principal Problem:Sickle cell disease, type S beta-zero thalassemia with crisis        HPI:  Ms. Malone is a 40 yo female with past medical history of sickle cell beta thalassemia, HTN, depression, and asthma who presents with chief complaint of pain in her legs.  She says that the pain started about 2 days ago and the pain is worse in her right leg than her left. The pain is associated with swelling and is located more in the bone rather than a particular joint. She says the pain is similar to previous sickle cell crises. She has tried her home percocet  mg q4 hours without significant relief. She usually uses this as a telling sign that she needs to come to the hospital. She is also complaining of some fatigue and shortness of breath with exertion. She denies chest pain, cough, congestion, fevers, chills, nausea, vomiting, diarrhea, constipation. She is currently on her menstrual period which is usually heavy. She says that she had a cold a week or two ago which she has recovered from. 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 She has not yet received her flu vaccination.    She follows with Dr. Montgomery for her sickle cell and was most recently saw on 12/27. She has been admitted 3 times in the last year for crisis(most recently in august 2019). Triggers for her usually include illness and changes in the weather. She says she has had acute chest in the past but cannot remember the last time. She cannot remember the last time she has received a blood transfusion. It appears her baseline hemoglobin lies between 9-10. She  has tried hydroxyurea before but did not tolerate/had a rash. Last time she saw Dr. Montgomery, she had low ferritin of 2 and was recommended to be given feraheme. She got one dose this past Tuesday and is scheduled to get another dose on Tuesday. It is suspected that low ferritin 2/2 heavy menstrual bleeding and it was recommended the patient follow up with OBGYN.    In the ED, the patient is afebrile, hypertensive in 180s and satting 97% on room air. Labs are notable for normal WBC, H/H 9.9/32.8, reticulocyte count 2.0, K 3 and rest of CMP within normal limits. Her UA had 1+ protein and >100 RBCs however patient on menstrual period. CXR obtained shows borderline cardiomegaly, coarsened interstitial lung markings, but no focal consolidation. She was given dilaudid 1 mg x 2, oral potassium, and 2L NS bolus.     Overview/Hospital Course:  Patient is admitted to IM4 for sickle cell crisis. She was started on IVF and pain regimen includes home percocet and IV dilaudid for severe pain.  Patient also c/o of menorrhagia much more so than her normal amount (she has heavy menstrual periods usually but this is much worse than normal.)   OBGYN consulted.  Patient was started on medroxyprogesterone for 7 days to help decrease her bleeding in this current setting.  Recommend patient follow up obgyn outpatient to discuss further options for AUB control including IUD vs hysterectomy.  TTE was preformed as patient c/o of dyspnea; it showed EF of 60% with some indeterminate amount of diastolic dysfunction.  Patient's pain appears well controlled with current pain regimen. Patient's H/H continues to drop with increase in her retic count. Suspecting anemia 2/2 menstrual period which has improved with starting progesterone and folate, b12 supplementation. Her pain is improving but still present. Improves with warm packs but declined topical pain relievers. Will decrease frequency of the patient's Dilaudid and schedule PO pain  medications. Patient continuing to have worsening pain similar to her presentation in the hospital.  Maximized multi-modal pain options by increasing tylenol and gabapentin dosage/ frequency as well as changing patient's oxycodone IR to longer acting.  On 1/14 patient continues to struggle with pain with current regimen, requiring frequent IV breakthrough pain control.  Changed patient to dilaudid PCA pump at 0.2mg/bolus every 6 min for a total of 2mg/hr.  On 1/15 patient went through 12 mg of IV dilaudid over 16 hours without relief of her lower extremity pain.  Difficult to control patient's pain with conservative measures of pain control and IVF.  Her hemoglobin has remained stable around ~9 (althought baseline closer to 10-11.)  Ordered MRI of BL hips to assess for ATN.  Consulted hematology for any additional suggestions in the management of this patient's sickle cell crisis.     Interval History: Patient still endorsing persistent pain that is moderately controlled with the PCA pump; however pain appears to be improving from day prior.     -will attempt to wean Oxycodone from 20 to 10 mg long acting BID.   -plan to transition to orals tomorrow and d/c the PCA pump    Review of Systems   Constitutional: Negative for chills and fever.   Respiratory: Negative for cough and shortness of breath.    Cardiovascular: Positive for leg swelling (and pain). Negative for chest pain.   Gastrointestinal: Negative for abdominal distention, abdominal pain, nausea and vomiting.   Musculoskeletal: Negative for arthralgias and back pain.        Hip pain BL   Neurological: Negative for weakness and headaches.     Objective:     Vital Signs (Most Recent):  Temp: 98.4 °F (36.9 °C) (01/16/20 0852)  Pulse: 89 (01/16/20 0852)  Resp: 18 (01/16/20 0852)  BP: (!) 176/91 (01/16/20 0852)  SpO2: 97 % (01/16/20 0852) Vital Signs (24h Range):  Temp:  [98.3 °F (36.8 °C)-99 °F (37.2 °C)] 98.4 °F (36.9 °C)  Pulse:  [69-89] 89  Resp:  [17-18]  18  SpO2:  [95 %-100 %] 97 %  BP: (154-184)/() 176/91     Weight: (!) 147 kg (324 lb)  Body mass index is 59.26 kg/m².    Intake/Output Summary (Last 24 hours) at 1/16/2020 1008  Last data filed at 1/16/2020 0900  Gross per 24 hour   Intake 240 ml   Output --   Net 240 ml      Physical Exam   Constitutional: She is oriented to person, place, and time.   Patient is a morbidly obese BF who appears stated age, alert, and in NAD.    HENT:   Head: Normocephalic and atraumatic.   Eyes: Pupils are equal, round, and reactive to light. EOM are normal.   Neck: Normal range of motion. Neck supple.   Cardiovascular: Normal rate, regular rhythm and normal heart sounds.   Pulmonary/Chest: Effort normal and breath sounds normal. No stridor. No respiratory distress. She has no wheezes.   Abdominal: Soft. Bowel sounds are normal. She exhibits no distension. There is no tenderness.   Musculoskeletal: Normal range of motion. She exhibits no edema.   JOSE LUIS hose present on BL LE.  Tenderness to palpation of LE BL   Neurological: She is alert and oriented to person, place, and time.   Skin: Skin is warm and dry. Capillary refill takes less than 2 seconds.   Psychiatric: She has a normal mood and affect.       Significant Labs:   CBC:   Recent Labs   Lab 01/14/20  1301 01/15/20  0714 01/16/20  0527   WBC 6.60 6.88 5.96   HGB 9.1* 9.3* 9.1*   HCT 30.9* 31.5* 30.5*    176 171     CMP:   Recent Labs   Lab 01/15/20  0714 01/16/20  0527    137   K 4.1 3.9    101   CO2 28 26    131*   BUN 10 13   CREATININE 0.8 0.9   CALCIUM 8.7 8.5*   PROT 7.4 7.4   ALBUMIN 3.5 3.4*   BILITOT 0.3 0.3   ALKPHOS 82 83   AST 21 43*   ALT 17 35   ANIONGAP 8 10   EGFRNONAA >60.0 >60.0       Significant Imaging: I have reviewed and interpreted all pertinent imaging results/findings within the past 24 hours.      Assessment/Plan:      * Sickle cell disease, type S beta-zero thalassemia with crisis  38 yo female with sickle cell beta  thalassemia presents with bilateral leg pain R>L for 2 days consistent with her typical pain crises. Her pain was unrelieved by her home percocet. She denies any symptoms of infection although she says she was sick 1-2 weeks ago with flu like symptoms but has recovered. She is having some shortness of breath also but denies chest pain or cough. On arrival she is stable and satting well on room air. Interestingly hemoglobin appears to be at baseline and retic count normal but patient does have significant iron and folate deficiency which could cause reticulocyte count to be low. I have low suspicion for acute chest at this time and she does not appear to have infection.  -LE U/S showed no DVT  -Infectious workup: UA without evidence of infection, CXR without focal consolidation. Follow up blood cultures, respiratory infection panel, influenza test  -Hemoglobin electrophoresis consistent with sickle cell SC disease with beta thal.  Similar in numbers to previous testing less than a year ago.  Pathology smear showed no signs of increased sickling.   -Heme/onc consulted who does not recommend exchange transfusion at the time time.  Rec to continue IVF and pain control    Plan:  - Pain control: Dilaudid pump at 0.4mg/ bolus allowed every 10 min (max of 2.4mg/hour)    Decreased scheduled Oxycodone to 10mg long acting q12h   - Plan to d/c PCA pump tomorrow and deescalate to PO pain control  - continue acetaminophen to 1g q6h scheduled  - continue gabapentin to 800mg TID  - Daily reticulocyte count    - Folic acid supplementation    Sickle cell pain crisis  See SCD      Menorrhagia with regular cycle  See SAMUEL acute blood loss      Folate deficiency  Levels were 2.9 about 2 weeks ago. Patient not on supplementation at home    Plan:  -continue start folic acid 1 mg daily      Shortness of breath  Patient has a history of intermittent asthma for which she takes symbicort inhaler daily with albuterol or duonebs as needed. She  has been experiencing 4 day history of shortness of breath with exertion, improved with rest, with associated leg swelling. No orthopnea or PND. Differential includes asthma exacerbation vs HF vs anemia vs deconditioning vs PE. She has used her inhaler without relief. Her las echo was in August and showed low normal EF 50-55% and concentric LVH.   -TTE: 60% EF. Concentric LVH. Indeterminate amount of diastolic dysfunction.    Plan:   -Continue controller inhaler while inpatient  -Duonebs as needed for shortness of breath  RESOLVED        Iron deficiency anemia due to chronic blood loss  Patient has a history of menorrhagia and most recently had a ferritin of 2. She is s/p 1 dose of feraheme on Tuesday and is supposed to receive another dose next Tuesday. She has tried oral iron supplementation in the past but remembers not being able to tolerate . Patient states that she chronically has heavy menstrual periods; however, this current cycle is much heavier than usual.   -Pelvic U/s showed enlarged uterus with normal endometrium     Plan:  -OBGYN consulted for AUB, appreciate recs  -continue medroxyprogesterone x7 days to help decrease her acute bleeding.   -Trend CBC daily  -Outpatient f/u with OBGYN        Trigeminal neuralgia  Home medication: Carbamazepine 800 mg BID    Plan:  -Continue home Carbamazepine      Essential hypertension  Home medication: amlodipine 10 mg qd, Coreg 25 mg BID(patient has actually been taking incorrectly 12.5 BID), and HCTZ 25 mg daily    Plan:  -Continue home medications   -Ensure adequate pain control before giving PRN hydralazine  -Patient continues to have fluctuating levels of BP from SBP of 200 to 130.  Likely associated with pain and pain relief with PRN medication.  Will continue to monitor.        VTE Risk Mitigation (From admission, onward)         Ordered     enoxaparin injection 40 mg  Every 12 hours      01/14/20 1047     Place sequential compression device  Until  discontinued      01/10/20 0219     IP VTE HIGH RISK PATIENT  Once      01/10/20 0015                      Waldemar Duffy MD  Department of Hospital Medicine   [facility]

## 2020-01-17 ENCOUNTER — ANESTHESIA (OUTPATIENT)
Dept: MEDSURG UNIT | Facility: HOSPITAL | Age: 40
DRG: 812 | End: 2020-01-17
Payer: COMMERCIAL

## 2020-01-17 ENCOUNTER — ANESTHESIA EVENT (OUTPATIENT)
Dept: MEDSURG UNIT | Facility: HOSPITAL | Age: 40
DRG: 812 | End: 2020-01-17
Payer: COMMERCIAL

## 2020-01-17 PROBLEM — J45.20 INTERMITTENT ASTHMA WITHOUT COMPLICATION: Status: ACTIVE | Noted: 2020-01-10

## 2020-01-17 LAB
ANION GAP SERPL CALC-SCNC: 6 MMOL/L (ref 8–16)
BASOPHILS # BLD AUTO: 0.01 K/UL (ref 0–0.2)
BASOPHILS NFR BLD: 0.2 % (ref 0–1.9)
BUN SERPL-MCNC: 9 MG/DL (ref 6–20)
CALCIUM SERPL-MCNC: 8.8 MG/DL (ref 8.7–10.5)
CHLORIDE SERPL-SCNC: 102 MMOL/L (ref 95–110)
CO2 SERPL-SCNC: 30 MMOL/L (ref 23–29)
CREAT SERPL-MCNC: 0.8 MG/DL (ref 0.5–1.4)
DIFFERENTIAL METHOD: ABNORMAL
EOSINOPHIL # BLD AUTO: 0 K/UL (ref 0–0.5)
EOSINOPHIL NFR BLD: 0 % (ref 0–8)
ERYTHROCYTE [DISTWIDTH] IN BLOOD BY AUTOMATED COUNT: 24.5 % (ref 11.5–14.5)
EST. GFR  (AFRICAN AMERICAN): >60 ML/MIN/1.73 M^2
EST. GFR  (NON AFRICAN AMERICAN): >60 ML/MIN/1.73 M^2
GLUCOSE SERPL-MCNC: 92 MG/DL (ref 70–110)
HCT VFR BLD AUTO: 30.6 % (ref 37–48.5)
HGB BLD-MCNC: 9 G/DL (ref 12–16)
IMM GRANULOCYTES # BLD AUTO: 0.01 K/UL (ref 0–0.04)
IMM GRANULOCYTES NFR BLD AUTO: 0.2 % (ref 0–0.5)
LYMPHOCYTES # BLD AUTO: 2.5 K/UL (ref 1–4.8)
LYMPHOCYTES NFR BLD: 46.3 % (ref 18–48)
MAGNESIUM SERPL-MCNC: 2 MG/DL (ref 1.6–2.6)
MCH RBC QN AUTO: 19.9 PG (ref 27–31)
MCHC RBC AUTO-ENTMCNC: 29.4 G/DL (ref 32–36)
MCV RBC AUTO: 68 FL (ref 82–98)
MONOCYTES # BLD AUTO: 0.6 K/UL (ref 0.3–1)
MONOCYTES NFR BLD: 11.3 % (ref 4–15)
NEUTROPHILS # BLD AUTO: 2.3 K/UL (ref 1.8–7.7)
NEUTROPHILS NFR BLD: 42 % (ref 38–73)
NRBC BLD-RTO: 2 /100 WBC
PHOSPHATE SERPL-MCNC: 3.1 MG/DL (ref 2.7–4.5)
PLATELET # BLD AUTO: 178 K/UL (ref 150–350)
PMV BLD AUTO: 9.3 FL (ref 9.2–12.9)
POTASSIUM SERPL-SCNC: 3.8 MMOL/L (ref 3.5–5.1)
RBC # BLD AUTO: 4.53 M/UL (ref 4–5.4)
RETICS/RBC NFR AUTO: 4.6 % (ref 0.5–2.5)
SODIUM SERPL-SCNC: 138 MMOL/L (ref 136–145)
WBC # BLD AUTO: 5.38 K/UL (ref 3.9–12.7)

## 2020-01-17 PROCEDURE — 36000 PLACE NEEDLE IN VEIN: CPT | Performed by: STUDENT IN AN ORGANIZED HEALTH CARE EDUCATION/TRAINING PROGRAM

## 2020-01-17 PROCEDURE — 63600175 PHARM REV CODE 636 W HCPCS: Performed by: STUDENT IN AN ORGANIZED HEALTH CARE EDUCATION/TRAINING PROGRAM

## 2020-01-17 PROCEDURE — 99232 PR SUBSEQUENT HOSPITAL CARE,LEVL II: ICD-10-PCS | Mod: ,,, | Performed by: HOSPITALIST

## 2020-01-17 PROCEDURE — 25000003 PHARM REV CODE 250: Performed by: STUDENT IN AN ORGANIZED HEALTH CARE EDUCATION/TRAINING PROGRAM

## 2020-01-17 PROCEDURE — 80048 BASIC METABOLIC PNL TOTAL CA: CPT

## 2020-01-17 PROCEDURE — 97116 GAIT TRAINING THERAPY: CPT

## 2020-01-17 PROCEDURE — 99232 SBSQ HOSP IP/OBS MODERATE 35: CPT | Mod: ,,, | Performed by: HOSPITALIST

## 2020-01-17 PROCEDURE — 25000003 PHARM REV CODE 250: Performed by: HOSPITALIST

## 2020-01-17 PROCEDURE — 85045 AUTOMATED RETICULOCYTE COUNT: CPT

## 2020-01-17 PROCEDURE — 36415 COLL VENOUS BLD VENIPUNCTURE: CPT

## 2020-01-17 PROCEDURE — 63600175 PHARM REV CODE 636 W HCPCS: Performed by: HOSPITALIST

## 2020-01-17 PROCEDURE — 83735 ASSAY OF MAGNESIUM: CPT

## 2020-01-17 PROCEDURE — 85025 COMPLETE CBC W/AUTO DIFF WBC: CPT

## 2020-01-17 PROCEDURE — 84100 ASSAY OF PHOSPHORUS: CPT

## 2020-01-17 PROCEDURE — 11000001 HC ACUTE MED/SURG PRIVATE ROOM

## 2020-01-17 RX ORDER — OXYCODONE HYDROCHLORIDE 10 MG/1
10 TABLET ORAL EVERY 4 HOURS PRN
Status: DISCONTINUED | OUTPATIENT
Start: 2020-01-17 | End: 2020-01-19 | Stop reason: HOSPADM

## 2020-01-17 RX ORDER — HYDROMORPHONE HYDROCHLORIDE 1 MG/ML
1 INJECTION, SOLUTION INTRAMUSCULAR; INTRAVENOUS; SUBCUTANEOUS EVERY 4 HOURS PRN
Status: DISCONTINUED | OUTPATIENT
Start: 2020-01-17 | End: 2020-01-19

## 2020-01-17 RX ORDER — FUROSEMIDE 20 MG/1
20 TABLET ORAL ONCE
Status: COMPLETED | OUTPATIENT
Start: 2020-01-17 | End: 2020-01-17

## 2020-01-17 RX ADMIN — SENNOSIDES AND DOCUSATE SODIUM 2 TABLET: 8.6; 5 TABLET ORAL at 09:01

## 2020-01-17 RX ADMIN — MEDROXYPROGESTERONE ACETATE 20 MG: 10 TABLET ORAL at 08:01

## 2020-01-17 RX ADMIN — Medication 6 MG: at 09:01

## 2020-01-17 RX ADMIN — FLUOXETINE 40 MG: 20 CAPSULE ORAL at 09:01

## 2020-01-17 RX ADMIN — ACETAMINOPHEN 1000 MG: 500 TABLET ORAL at 09:01

## 2020-01-17 RX ADMIN — HYDROXYUREA 500 MG: 500 CAPSULE ORAL at 09:01

## 2020-01-17 RX ADMIN — GABAPENTIN 800 MG: 400 CAPSULE ORAL at 09:01

## 2020-01-17 RX ADMIN — AMLODIPINE BESYLATE 10 MG: 10 TABLET ORAL at 09:01

## 2020-01-17 RX ADMIN — ENOXAPARIN SODIUM 40 MG: 100 INJECTION SUBCUTANEOUS at 09:01

## 2020-01-17 RX ADMIN — HYDROCHLOROTHIAZIDE 25 MG: 12.5 TABLET ORAL at 08:01

## 2020-01-17 RX ADMIN — OXYCODONE HYDROCHLORIDE 10 MG: 10 TABLET, FILM COATED, EXTENDED RELEASE ORAL at 09:01

## 2020-01-17 RX ADMIN — PROCHLORPERAZINE EDISYLATE 5 MG: 5 INJECTION INTRAMUSCULAR; INTRAVENOUS at 10:01

## 2020-01-17 RX ADMIN — MEDROXYPROGESTERONE ACETATE 20 MG: 10 TABLET ORAL at 02:01

## 2020-01-17 RX ADMIN — FUROSEMIDE 20 MG: 20 TABLET ORAL at 05:01

## 2020-01-17 RX ADMIN — CARBAMAZEPINE 400 MG: 200 TABLET ORAL at 09:01

## 2020-01-17 RX ADMIN — PROCHLORPERAZINE EDISYLATE 5 MG: 5 INJECTION INTRAMUSCULAR; INTRAVENOUS at 04:01

## 2020-01-17 RX ADMIN — ACETAMINOPHEN 1000 MG: 500 TABLET ORAL at 08:01

## 2020-01-17 RX ADMIN — LACTULOSE 10 G: 20 SOLUTION ORAL at 09:01

## 2020-01-17 RX ADMIN — OXYCODONE HYDROCHLORIDE 15 MG: 10 TABLET ORAL at 03:01

## 2020-01-17 RX ADMIN — CARVEDILOL 25 MG: 25 TABLET, FILM COATED ORAL at 09:01

## 2020-01-17 RX ADMIN — HYDROMORPHONE HYDROCHLORIDE 1 MG: 1 INJECTION, SOLUTION INTRAMUSCULAR; INTRAVENOUS; SUBCUTANEOUS at 09:01

## 2020-01-17 RX ADMIN — CARVEDILOL 25 MG: 25 TABLET, FILM COATED ORAL at 08:01

## 2020-01-17 RX ADMIN — GABAPENTIN 800 MG: 400 CAPSULE ORAL at 02:01

## 2020-01-17 RX ADMIN — HYDROMORPHONE HYDROCHLORIDE 1 MG: 1 INJECTION, SOLUTION INTRAMUSCULAR; INTRAVENOUS; SUBCUTANEOUS at 01:01

## 2020-01-17 RX ADMIN — POLYETHYLENE GLYCOL 3350 17 G: 17 POWDER, FOR SOLUTION ORAL at 09:01

## 2020-01-17 RX ADMIN — FOLIC ACID 1 MG: 1 TABLET ORAL at 09:01

## 2020-01-17 RX ADMIN — ENOXAPARIN SODIUM 40 MG: 100 INJECTION SUBCUTANEOUS at 08:01

## 2020-01-17 NOTE — PLAN OF CARE
Problem: Fall Injury Risk  Goal: Absence of Fall and Fall-Related Injury  Outcome: Ongoing, Progressing     Problem: Adult Inpatient Plan of Care  Goal: Plan of Care Review  Outcome: Ongoing, Progressing  Goal: Patient-Specific Goal (Individualization)  Outcome: Ongoing, Progressing  Goal: Absence of Hospital-Acquired Illness or Injury  Outcome: Ongoing, Progressing  Goal: Optimal Comfort and Wellbeing  Outcome: Ongoing, Progressing  Goal: Readiness for Transition of Care  Outcome: Ongoing, Progressing  Goal: Rounds/Family Conference  Outcome: Ongoing, Progressing     Problem: Acute Neurologic Deterioration (Sickle Cell Disease)  Goal: Absence of Acute Neurologic Symptoms  Outcome: Ongoing, Progressing     Problem: Adjustment to Illness (Sickle Cell Disease)  Goal: Optimal Coping with Sickle Cell Disease  Outcome: Ongoing, Progressing     Problem: Inadequate Tissue Perfusion (Sickle Cell Disease)  Goal: Effective Tissue Perfusion  Outcome: Ongoing, Progressing     Problem: Infection (Sickle Cell Disease)  Goal: Absence of Infection Signs/Symptoms  Outcome: Ongoing, Progressing     Problem: Pain (Sickle Cell Disease)  Goal: Acceptable Pain Control  Outcome: Ongoing, Progressing     Problem: Respiratory Compromise (Sickle Cell Disease)  Goal: Optimal Oxygenation  Outcome: Ongoing, Progressing  Loss of IV access during day shift yesterday.  Several unsuccessful attempts to gain access.  Anesthesia up on floor at 2:30 to use ultrasound guided to obtain IV site.  Pt AAO X 4; able to express needs.  C/o pain.. IV site working right now.  Patient needs a better access.  Resting quietly.  Safety maintained.  Bed in low position,  call  light in reach.    Will continue to monitor

## 2020-01-17 NOTE — NURSING
At shift change, day shift nurse said that patient had no IV since before 5pm on Thursday, 1/16/20.  Several attempts were made to gain IV site without success. Patient did not receive any IV medication  During night shift until now due to loss of IV site.   Anesthesiology was able to gain IV access using ultrasound guidance at about 2:45am.   18G to Left ac.intact and flushed.  PCA pump reconnected by FRANCIS Meng/OC on this shift.

## 2020-01-17 NOTE — ASSESSMENT & PLAN NOTE
This is a 39 year old female with sickle cell beta-thalassemia and prior history of acute chest syndrome who presented on 01/10/20 with bilateral leg pain for two days duration associated with swelling, consistent with her typical pain crises. Lower extremity ultrasound negative for underlying DVT; patient declined MRI to rule out underling ATN. Infectious work-up with unremarkable CXR, UA, influenza, and respiratory viral panel. Hemoglobin electrophoresis consistent with sickle cell SC disease with beta thalassemia with similar numbers to previous testing less than a year ago. Pathology smear showed no signs of increased sickling; hematology recommending against exchange transfusion. Her crisis course was refractory to attempted pain control with PO and IV PRN pain regimen, including scheduled Tylenol and Gabapentin, and she is status post initiation on PCA pump on 01/14 with subsequent increase in pump requirements on 01/15, however complicated by loss of IV access on 01/17 and patient frustration with frequent failed attempts to re-establish.     Plan:  -Pain control: Discontinue PCA pump and switch to attempted PO regimen with Oxycodone ER 10 mg Q12H with Oxycodone IR 10 mg Q4H PRN for moderate pain and Oxycodone IR 15 mg Q4H PRN for severe pain, with Dilaudid Q4H PRN for severe pain refractory to PO medications.   -NARCAN PRN.   -Continue Acetaminophen 1 g Q6H.   -Continue Gabapentin to 800 mg TID  -Continue Hydroxyurea.   -Bowel regimen ordered.   -Folic acid supplementation  -CBC and reticulocyte count ordered daily.

## 2020-01-17 NOTE — ASSESSMENT & PLAN NOTE
-Home medication: Amlodipine 10 mg daily, COREG 25 mg BID (patient has actually been taking incorrectly 12.5 BID), and HCTZ 25 mg daily.     Plan:  -Patient continues to have fluctuating levels of BP from SBP of 200 to 130.  Likely associated with pain and pain relief with PRN medication. Continue home regimen.   -Hydralazine ordered PRN.

## 2020-01-17 NOTE — PT/OT/SLP PROGRESS
Physical Therapy Treatment and Discharge    Patient Name:  Nazanin Malone   MRN:  [id number]    Recommendations:     Discharge Recommendations:  home   Discharge Equipment Recommendations: none   Barriers to discharge: None    Assessment:     Nazanin Malone is a 39 y.o. female admitted with a medical diagnosis of Sickle cell disease, type S beta-zero thalassemia with crisis.  The patient is modified independent with mobility at this time. She ambulated 70' with no AD and modified independent, she ascended/descended 15 steps with R rail and modified independence. She is at her functional baseline and safe to return home when medically cleared.     Rehab Prognosis: Good; patient would benefit from acute skilled PT services to address these deficits and reach maximum level of function.    Recent Surgery: * No surgery found *      Plan:     · Discharge skilled PT. Patient is safe to ambulate independently. Patient in agreement with POC.     Subjective     Chief Complaint: none  Patient/Family Comments/goals: return home, get back to bed to finish TV show  Pain/Comfort:  · Pain Rating 1: 0/10  · Pain Addressed 1: Pre-medicate for activity      Objective:     Communicated with RN prior to session.  Patient found sitting EOB with PCA, oxygen, peripheral IV upon PT entry to room.     General Precautions: Standard, fall   Orthopedic Precautions:N/A   Braces: N/A     Functional Mobility:    Bed Mobility  Deferred, patient sitting EOB   Transfers Sit to Stand:  modified independent    Gait  Gait Distance: 70 ft with no AD  Assistance Level: modified independent   Description: reciprocal strides, good gait speed, increased lateral weight shift   Stairs Ascended/descended: 15 stairs   Quality: reciprocal strides, good speed  Level of assist: modified independent   Rails used: R       AM-PAC 6 CLICK MOBILITY  Turning over in bed (including adjusting bedclothes, sheets and blankets)?: 4  Sitting down on and standing up from a  chair with arms (e.g., wheelchair, bedside commode, etc.): 4  Moving from lying on back to sitting on the side of the bed?: 4  Moving to and from a bed to a chair (including a wheelchair)?: 4  Need to walk in hospital room?: 4  Climbing 3-5 steps with a railing?: 4  Basic Mobility Total Score: 24       Therapeutic Activities and Exercises:   Patient is safe to ambulate independently with no AD.   Patient educated on role of therapy, goals of session, benefits of out of bed mobility. Patient agreeable to mobilize with therapy.  Discussed PT plan of care during hospitalization. Patient educated that they need to call for assistance to mobilize out of bed. Whiteboard updated as appropriate. Patient educated on how their diagnosis impacts their mobility within PT scope of practice.   Discussed PT POC, discharging acute therapy. Patient encouraged to continue ambulating to prevent deconditioning, verbalized understanding.     Patient left sitting EOB with all lines intact and call button in reach..    GOALS:   Multidisciplinary Problems     Physical Therapy Goals     Not on file          Multidisciplinary Problems (Resolved)        Problem: Physical Therapy Goal    Goal Priority Disciplines Outcome Goal Variances Interventions   Physical Therapy Goal   (Resolved)     PT, PT/OT Met     Description:  Goals to be met by: 2020    Patient will increase functional independence with mobility by performin. Supine to sit with Modified [place].  2. Gait  x 250 feet with Supervision using no AD and no cues for pacing and energy conservation.   3. Ascend/descend 15 stair with bilateral Handrails and Supervision.   4. Sit to stand with modified independence.                      Time Tracking:     PT Received On: 20  PT Start Time: 1407     PT Stop Time: 1418  PT Total Time (min): 11 min     Billable Minutes: Gait Training 11    Treatment Type: Treatment  PT/PTA: PT     PTA Visit Number: 0     Kierra Harvey  PT  01/17/2020

## 2020-01-17 NOTE — SUBJECTIVE & OBJECTIVE
Interval History: No acute events overnight. Continuation of PCA pump limited by loss of IV access overnight and patient is requesting trial of PO regimen in order to reduce frustration and pain related to multiple sticks required to re-establish IV access. She denies worsening pain.     Review of Systems   Constitutional: Negative for chills and fever.   Respiratory: Negative for chest tightness and shortness of breath.    Cardiovascular: Negative for chest pain.   Gastrointestinal: Negative for abdominal distention, abdominal pain, nausea and vomiting.   Musculoskeletal: Negative for joint swelling.     Objective:     Vital Signs (Most Recent):  Temp: 99.6 °F (37.6 °C) (01/17/20 1124)  Pulse: 81 (01/17/20 1124)  Resp: 16 (01/17/20 1124)  BP: 135/68 (01/17/20 1124)  SpO2: 96 % (01/17/20 1124) Vital Signs (24h Range):  Temp:  [98 °F (36.7 °C)-99.6 °F (37.6 °C)] 99.6 °F (37.6 °C)  Pulse:  [72-82] 81  Resp:  [15-18] 16  SpO2:  [94 %-99 %] 96 %  BP: (128-169)/(68-91) 135/68     Weight: (!) 147 kg (324 lb)  Body mass index is 59.26 kg/m².    Intake/Output Summary (Last 24 hours) at 1/17/2020 1318  Last data filed at 1/17/2020 0700  Gross per 24 hour   Intake 780 ml   Output --   Net 780 ml      Physical Exam   Constitutional: She is oriented to person, place, and time.   Patient is a morbidly obese BF who appears stated age, alert, and in NAD.    HENT:   Head: Normocephalic and atraumatic.   Eyes: Pupils are equal, round, and reactive to light. EOM are normal.   Neck: Normal range of motion. Neck supple.   Cardiovascular: Normal rate, regular rhythm and normal heart sounds.   Pulmonary/Chest: Effort normal and breath sounds normal. No stridor. No respiratory distress. She has no wheezes.   Abdominal: Soft. Bowel sounds are normal. She exhibits no distension. There is no tenderness.   Musculoskeletal: Normal range of motion. She exhibits no edema.   JOSE LUIS hose present on BL LE.  Tenderness to palpation of LE BL    Neurological: She is alert and oriented to person, place, and time.   Skin: Skin is warm and dry. Capillary refill takes less than 2 seconds.   Psychiatric: She has a normal mood and affect.       Significant Labs: All pertinent labs within the past 24 hours have been reviewed.    Significant Imaging: I have reviewed all pertinent imaging results/findings within the past 24 hours.

## 2020-01-17 NOTE — ASSESSMENT & PLAN NOTE
-History of intermittent asthma for which she takes symbicort inhaler daily with albuterol or duonebs as needed.  -Presented with four day history of shortness of breath with exertion, improved with rest, with associated leg swelling. No orthopnea or PND. Differential includes asthma exacerbation vs HF vs anemia vs deconditioning vs PE.   -EKG and multiple troponins unremarkable.   -TTE on 12/10 with normal EF and indeterminate diastolic dysfunction.   -Lower extremity ultrasound without DVT.     Plan:   -Continue controller inhaler while inpatient  -Duonebs as needed for shortness of breath

## 2020-01-17 NOTE — ASSESSMENT & PLAN NOTE
-Levels were 2.9 about 2 weeks prior to admission.  -Patient not on supplementation at home    Plan:  -Continue folic acid 1 mg daily

## 2020-01-17 NOTE — PROGRESS NOTES
[facility] Hospital Medicine  Progress Note    Patient Name: Nazanin Malone  MRN: [id number]  Patient Class: IP- Inpatient   Admission Date: 1/9/2020  Length of Stay: 8 days  Attending Physician: Selvin Stern MD  Primary Care Provider: Primary Doctor [facility] Medicine Team: [facility] HOSP MED 4 Lenin Chadwick MD    Subjective:     Principal Problem:Sickle cell disease, type S beta-zero thalassemia with crisis    HPI:  Ms. Malone is a 40 yo female with past medical history of sickle cell beta thalassemia, HTN, depression, and asthma who presents with chief complaint of pain in her legs.  She says that the pain started about 2 days ago and the pain is worse in her right leg than her left. The pain is associated with swelling and is located more in the bone rather than a particular joint. She says the pain is similar to previous sickle cell crises. She has tried her home percocet  mg q4 hours without significant relief. She usually uses this as a telling sign that she needs to come to the hospital. She is also complaining of some fatigue and shortness of breath with exertion. She denies chest pain, cough, congestion, fevers, chills, nausea, vomiting, diarrhea, constipation. She is currently on her menstrual period which is usually heavy. She says that she had a cold a week or two ago which she has recovered from. 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 She has not yet received her flu vaccination.    She follows with Dr. Montgomery for her sickle cell and was most recently saw on 12/27. She has been admitted 3 times in the last year for crisis(most recently in august 2019). Triggers for her usually include illness and changes in the weather. She says she has had acute chest in the past but cannot remember the last time. She cannot remember the last time she has received a blood transfusion. It appears her baseline hemoglobin lies between 9-10. She  has tried hydroxyurea before but did not tolerate/had a rash. Last time she saw Dr. Montgomery, she had low ferritin of 2 and was recommended to be given feraheme. She got one dose this past Tuesday and is scheduled to get another dose on Tuesday. It is suspected that low ferritin 2/2 heavy menstrual bleeding and it was recommended the patient follow up with OBGYN.    ED course: In the ED, the patient is afebrile, hypertensive in 180s and satting 97% on room air. Labs are notable for normal WBC, H/H 9.9/32.8, reticulocyte count 2.0, K 3 and rest of CMP within normal limits. Her UA had 1+ protein and >100 RBCs however patient on menstrual period. CXR obtained shows borderline cardiomegaly, coarsened interstitial lung markings, but no focal consolidation. She was given dilaudid 1 mg x 2, oral potassium, and 2L NS bolus.     Overview/Hospital Course:  Ms. Nazanin Malone was admitted to [facility] on 01/10/20 for sickle cell crisis. She was started on IVF and pain regimen including home PERCOCET and IV DILAUDID for severe pain. Patient also reported menorrhagia much more so than her normal amount (she has heavy menstrual periods usually but this is much worse than normal.) OBGYN was consulted on 01/10 and she was started on Medroxyprogesterone for 7 days to help decrease her bleeding in this current setting; recommended patient follow up OB/GYN outpatient to discuss further options for AUB control including IUD vs hysterectomy. TTE was preformed on 01/10 as patient reported of dyspnea, however it showed EF of 60% with some indeterminate amount of diastolic dysfunction. Patient's H/H continued to drop with increase in her retic count, and suspected anemia was from menstrual period which improved with starting progesterone, folate, and B12 supplementation. Her pain was relative well-controlled but intolerance of decreasing frequency of IV DILAUDID and scheduled PO pain medications, and adding adjunctive pain control with  scheduled Tylenol and Gabapentin. On 01/14,  patient continued to struggle with pain with current regimen, requiring frequent IV breakthrough pain control and she was changed to DILAUDID PCA pump at 0.2mg/bolus every 6 min for a total of 2mg/hr. On 01/15 patient went through 12 mg of IV dilaudid over 16 hours without relief of her lower extremity pain.  Because of difficulty controling patient's pain with conservative measures of pain control and IVF, hematology was consulted on 01/15. Peripheral smear and Hgb electrophoresis were unremarkable for significant sickling of RBC's more than baseline, and exchange transfusion was not recommended. She was re-initiated on Hydroxyurea on 01/16, in addition to XANAX secondary to patient reported over-whelming anxiety related to disease course and hospitalization. Considered ordering MRI of bilateral hips to rule out underlying ATN contributing to pain, however patient declined procedure.  Her hemoglobin has remained stable around ~9 (althought baseline closer to 10-11).     Interval History: No acute events overnight. Continuation of PCA pump limited by loss of IV access overnight and patient is requesting trial of PO regimen in order to reduce frustration and pain related to multiple sticks required to re-establish IV access. She denies worsening pain.     Review of Systems   Constitutional: Negative for chills and fever.   Respiratory: Negative for chest tightness and shortness of breath.    Cardiovascular: Negative for chest pain.   Gastrointestinal: Negative for abdominal distention, abdominal pain, nausea and vomiting.   Musculoskeletal: Negative for joint swelling.     Objective:     Vital Signs (Most Recent):  Temp: 99.6 °F (37.6 °C) (01/17/20 1124)  Pulse: 81 (01/17/20 1124)  Resp: 16 (01/17/20 1124)  BP: 135/68 (01/17/20 1124)  SpO2: 96 % (01/17/20 1124) Vital Signs (24h Range):  Temp:  [98 °F (36.7 °C)-99.6 °F (37.6 °C)] 99.6 °F (37.6 °C)  Pulse:  [72-82] 81  Resp:   [15-18] 16  SpO2:  [94 %-99 %] 96 %  BP: (128-169)/(68-91) 135/68     Weight: (!) 147 kg (324 lb)  Body mass index is 59.26 kg/m².    Intake/Output Summary (Last 24 hours) at 1/17/2020 1318  Last data filed at 1/17/2020 0700  Gross per 24 hour   Intake 780 ml   Output --   Net 780 ml      Physical Exam   Constitutional: She is oriented to person, place, and time.   Patient is a morbidly obese BF who appears stated age, alert, and in NAD.    HENT:   Head: Normocephalic and atraumatic.   Eyes: Pupils are equal, round, and reactive to light. EOM are normal.   Neck: Normal range of motion. Neck supple.   Cardiovascular: Normal rate, regular rhythm and normal heart sounds.   Pulmonary/Chest: Effort normal and breath sounds normal. No stridor. No respiratory distress. She has no wheezes.   Abdominal: Soft. Bowel sounds are normal. She exhibits no distension. There is no tenderness.   Musculoskeletal: Normal range of motion. She exhibits no edema.   JOSE LUIS hose present on BL LE.  Tenderness to palpation of LE BL   Neurological: She is alert and oriented to person, place, and time.   Skin: Skin is warm and dry. Capillary refill takes less than 2 seconds.   Psychiatric: She has a normal mood and affect.       Significant Labs: All pertinent labs within the past 24 hours have been reviewed.    Significant Imaging: I have reviewed all pertinent imaging results/findings within the past 24 hours.      Assessment/Plan:      * Sickle cell disease, type S beta-zero thalassemia with crisis  This is a 39 year old female with sickle cell beta-thalassemia and prior history of acute chest syndrome who presented on 01/10/20 with bilateral leg pain for two days duration associated with swelling, consistent with her typical pain crises. Lower extremity ultrasound negative for underlying DVT; patient declined MRI to rule out underling ATN. Infectious work-up with unremarkable CXR, UA, influenza, and respiratory viral panel. Hemoglobin  electrophoresis consistent with sickle cell SC disease with beta thalassemia with similar numbers to previous testing less than a year ago. Pathology smear showed no signs of increased sickling; hematology recommending against exchange transfusion. Her crisis course was refractory to attempted pain control with PO and IV PRN pain regimen, including scheduled Tylenol and Gabapentin, and she is status post initiation on PCA pump on 01/14 with subsequent increase in pump requirements on 01/15, however complicated by loss of IV access on 01/17 and patient frustration with frequent failed attempts to re-establish.     Plan:  -Pain control: Discontinue PCA pump and switch to attempted PO regimen with Oxycodone ER 10 mg Q12H with Oxycodone IR 10 mg Q4H PRN for moderate pain and Oxycodone IR 15 mg Q4H PRN for severe pain, with Dilaudid Q4H PRN for severe pain refractory to PO medications.   -NARCAN PRN.   -Continue Acetaminophen 1 g Q6H.   -Continue Gabapentin to 800 mg TID  -Continue Hydroxyurea.   -Bowel regimen ordered.   -Folic acid supplementation  -CBC and reticulocyte count ordered daily.     Menorrhagia with regular cycle  -See iron deficiency anemia.       Folate deficiency  -Levels were 2.9 about 2 weeks prior to admission.  -Patient not on supplementation at home    Plan:  -Continue folic acid 1 mg daily      Intermittent asthma without complication  -History of intermittent asthma for which she takes symbicort inhaler daily with albuterol or duonebs as needed.  -Presented with four day history of shortness of breath with exertion, improved with rest, with associated leg swelling. No orthopnea or PND. Differential includes asthma exacerbation vs HF vs anemia vs deconditioning vs PE.   -EKG and multiple troponins unremarkable.   -TTE on 12/10 with normal EF and indeterminate diastolic dysfunction.   -Lower extremity ultrasound without DVT.     Plan:   -Continue controller inhaler while inpatient  -Duonebs as  needed for shortness of breath          Iron deficiency anemia due to chronic blood loss  -History of menorrhagia and most recently had a ferritin of 2 in 12/2019. She is status post 1 dose of FEREHEME on 01/07/10. She has tried oral iron supplementation in the past but remembers not being able to tolerate .Patient states that she chronically has heavy menstrual periods; however, her current cycle on admission was much heavier than usual.   -Pelvic U/S on 01/10 showed enlarged uterus with normal endometrium     Plan:  -OBGYN consulted for AUB, appreciate recs: continue Medroxyprogesterone x7 days to help decrease her acute bleeding; anticipated stop date 01/17.   -Trend CBC daily  -Outpatient F/U with OBGYN        Trigeminal neuralgia  -Home medication: Carbamazepine 800 mg BID    Plan:  -Continue home Carbamazepine      Essential hypertension  -Home medication: Amlodipine 10 mg daily, COREG 25 mg BID (patient has actually been taking incorrectly 12.5 BID), and HCTZ 25 mg daily.     Plan:  -Patient continues to have fluctuating levels of BP from SBP of 200 to 130.  Likely associated with pain and pain relief with PRN medication. Continue home regimen.   -Hydralazine ordered PRN.         VTE Risk Mitigation (From admission, onward)         Ordered     enoxaparin injection 40 mg  Every 12 hours      01/14/20 1047     Place sequential compression device  Until discontinued      01/10/20 0219     IP VTE HIGH RISK PATIENT  Once      01/10/20 0015                      Lenin Chadwick MD  Department of Hospital Medicine   [facility]

## 2020-01-17 NOTE — ANESTHESIA PROCEDURE NOTES
Peripheral IV Insertion    Diagnosis: I87.9 Disorder of vein, unspecified.    Patient location during procedure: floor  Procedure start time: 1/17/2020 2:30 AM  Timeout: 1/17/2020 2:30 AM  Procedure end time: 1/17/2020 2:34 AM    Staffing  Authorizing Provider: Pee Dave MD  Performing Provider: Pee Dave MD      Preanesthetic Checklist  Completed: patient identified, timeout performed, risks and benefits discussed and monitors and equipment checkedPeripheral IV Insertion  Skin Prep: chlorhexidine gluconate  Local Infiltration: none  Orientation: left  Location: antecubital  Catheter Size: 18 G  Catheter placement by Ultrasound guidance. Heme positive aspiration all ports.  Vessel Caliber: medium, patent, compressibility normal  Needle advanced into vessel with real time Ultrasound guidance.Insertion Attempts: 2  Assessment  Dressing: secured with tape and tegaderm  Patient: Tolerated well  Line flushed easily.

## 2020-01-17 NOTE — PLAN OF CARE
Problem: Fall Injury Risk  Goal: Absence of Fall and Fall-Related Injury  Outcome: Ongoing, Progressing  Intervention: Identify and Manage Contributors to Fall Injury Risk  Flowsheets (Taken 1/16/2020 1800)  Self-Care Promotion: independence encouraged  Medication Review/Management: medications reviewed; dosing adjusted; high risk medications identified  Intervention: Promote Injury-Free Environment  Flowsheets (Taken 1/16/2020 1800)  Safety Promotion/Fall Prevention: assistive device/personal item within reach  Environmental Safety Modification: assistive device/personal items within reach; clutter free environment maintained; lighting adjusted     Problem: Adult Inpatient Plan of Care  Goal: Plan of Care Review  Outcome: Ongoing, Progressing  Flowsheets (Taken 1/16/2020 1800)  Plan of Care Reviewed With: patient  Goal: Patient-Specific Goal (Individualization)  Outcome: Ongoing, Progressing  Flowsheets (Taken 1/16/2020 1800)  Individualized Care Needs: Reduced pain  Anxieties, Fears or Concerns: Inability to breath  Goal: Absence of Hospital-Acquired Illness or Injury  Outcome: Ongoing, Progressing  Intervention: Identify and Manage Fall Risk  Flowsheets (Taken 1/16/2020 1800)  Safety Promotion/Fall Prevention: assistive device/personal item within reach  Intervention: Prevent VTE (venous thromboembolism)  Flowsheets (Taken 1/16/2020 1800)  VTE Prevention/Management: ambulation promoted  Goal: Optimal Comfort and Wellbeing  Outcome: Ongoing, Progressing  Goal: Readiness for Transition of Care  Outcome: Ongoing, Progressing  Goal: Rounds/Family Conference  Outcome: Ongoing, Progressing     Problem: Acute Neurologic Deterioration (Sickle Cell Disease)  Goal: Absence of Acute Neurologic Symptoms  Outcome: Ongoing, Progressing     Problem: Adjustment to Illness (Sickle Cell Disease)  Goal: Optimal Coping with Sickle Cell Disease  Outcome: Ongoing, Progressing     Problem: Inadequate Tissue Perfusion (Sickle Cell  Disease)  Goal: Effective Tissue Perfusion  Outcome: Ongoing, Progressing     Problem: Infection (Sickle Cell Disease)  Goal: Absence of Infection Signs/Symptoms  Outcome: Ongoing, Progressing     Problem: Pain (Sickle Cell Disease)  Goal: Acceptable Pain Control  Outcome: Ongoing, Progressing     Problem: Respiratory Compromise (Sickle Cell Disease)  Goal: Optimal Oxygenation  Outcome: Ongoing, Progressing    Pt resting in bed. Pt Aox4. Respirations even and unlabored. Pt given xanax PO for anxiety.

## 2020-01-17 NOTE — ASSESSMENT & PLAN NOTE
-History of menorrhagia and most recently had a ferritin of 2 in 12/2019. She is status post 1 dose of FEREHEME on 01/07/10. She has tried oral iron supplementation in the past but remembers not being able to tolerate .Patient states that she chronically has heavy menstrual periods; however, her current cycle on admission was much heavier than usual.   -Pelvic U/S on 01/10 showed enlarged uterus with normal endometrium     Plan:  -OBGYN consulted for AUB, appreciate recs: continue Medroxyprogesterone x7 days to help decrease her acute bleeding; anticipated stop date 01/17.   -Trend CBC daily  -Outpatient F/U with [facility]IE

## 2020-01-17 NOTE — PLAN OF CARE
Problem: Physical Therapy Goal  Goal: Physical Therapy Goal  Description  Goals to be met by: 2020    Patient will increase functional independence with mobility by performin. Supine to sit with Modified [place].  2. Gait  x 250 feet with Supervision using no AD and no cues for pacing and energy conservation.   3. Ascend/descend 15 stair with bilateral Handrails and Supervision.   4. Sit to stand with modified independence.     Outcome: Met   Goals met, patient at functional baseline. Discharging acute therapy services at this time.   Kierra Harvey, PT   2020

## 2020-01-18 LAB
ANION GAP SERPL CALC-SCNC: 8 MMOL/L (ref 8–16)
BASOPHILS # BLD AUTO: 0.02 K/UL (ref 0–0.2)
BASOPHILS NFR BLD: 0.4 % (ref 0–1.9)
BUN SERPL-MCNC: 6 MG/DL (ref 6–20)
CALCIUM SERPL-MCNC: 8.8 MG/DL (ref 8.7–10.5)
CHLORIDE SERPL-SCNC: 107 MMOL/L (ref 95–110)
CO2 SERPL-SCNC: 27 MMOL/L (ref 23–29)
CREAT SERPL-MCNC: 0.7 MG/DL (ref 0.5–1.4)
DIFFERENTIAL METHOD: ABNORMAL
EOSINOPHIL # BLD AUTO: 0 K/UL (ref 0–0.5)
EOSINOPHIL NFR BLD: 0 % (ref 0–8)
ERYTHROCYTE [DISTWIDTH] IN BLOOD BY AUTOMATED COUNT: 25.2 % (ref 11.5–14.5)
EST. GFR  (AFRICAN AMERICAN): >60 ML/MIN/1.73 M^2
EST. GFR  (NON AFRICAN AMERICAN): >60 ML/MIN/1.73 M^2
GLUCOSE SERPL-MCNC: 87 MG/DL (ref 70–110)
HCT VFR BLD AUTO: 31.2 % (ref 37–48.5)
HGB BLD-MCNC: 9.4 G/DL (ref 12–16)
IMM GRANULOCYTES # BLD AUTO: 0.02 K/UL (ref 0–0.04)
IMM GRANULOCYTES NFR BLD AUTO: 0.4 % (ref 0–0.5)
LYMPHOCYTES # BLD AUTO: 2.3 K/UL (ref 1–4.8)
LYMPHOCYTES NFR BLD: 45.1 % (ref 18–48)
MAGNESIUM SERPL-MCNC: 2 MG/DL (ref 1.6–2.6)
MCH RBC QN AUTO: 20.4 PG (ref 27–31)
MCHC RBC AUTO-ENTMCNC: 30.1 G/DL (ref 32–36)
MCV RBC AUTO: 68 FL (ref 82–98)
MONOCYTES # BLD AUTO: 0.5 K/UL (ref 0.3–1)
MONOCYTES NFR BLD: 10.2 % (ref 4–15)
NEUTROPHILS # BLD AUTO: 2.2 K/UL (ref 1.8–7.7)
NEUTROPHILS NFR BLD: 43.9 % (ref 38–73)
NRBC BLD-RTO: 1 /100 WBC
PHOSPHATE SERPL-MCNC: 2.7 MG/DL (ref 2.7–4.5)
PLATELET # BLD AUTO: 179 K/UL (ref 150–350)
PMV BLD AUTO: 9.9 FL (ref 9.2–12.9)
POTASSIUM SERPL-SCNC: 3.5 MMOL/L (ref 3.5–5.1)
RBC # BLD AUTO: 4.61 M/UL (ref 4–5.4)
RETICS/RBC NFR AUTO: 4.2 % (ref 0.5–2.5)
SODIUM SERPL-SCNC: 142 MMOL/L (ref 136–145)
WBC # BLD AUTO: 4.99 K/UL (ref 3.9–12.7)

## 2020-01-18 PROCEDURE — 63600175 PHARM REV CODE 636 W HCPCS: Performed by: STUDENT IN AN ORGANIZED HEALTH CARE EDUCATION/TRAINING PROGRAM

## 2020-01-18 PROCEDURE — 99233 SBSQ HOSP IP/OBS HIGH 50: CPT | Mod: ,,, | Performed by: HOSPITALIST

## 2020-01-18 PROCEDURE — 99233 PR SUBSEQUENT HOSPITAL CARE,LEVL III: ICD-10-PCS | Mod: ,,, | Performed by: HOSPITALIST

## 2020-01-18 PROCEDURE — 94761 N-INVAS EAR/PLS OXIMETRY MLT: CPT

## 2020-01-18 PROCEDURE — 25000003 PHARM REV CODE 250: Performed by: STUDENT IN AN ORGANIZED HEALTH CARE EDUCATION/TRAINING PROGRAM

## 2020-01-18 PROCEDURE — 84100 ASSAY OF PHOSPHORUS: CPT

## 2020-01-18 PROCEDURE — 63600175 PHARM REV CODE 636 W HCPCS: Performed by: HOSPITALIST

## 2020-01-18 PROCEDURE — 11000001 HC ACUTE MED/SURG PRIVATE ROOM

## 2020-01-18 PROCEDURE — 25000242 PHARM REV CODE 250 ALT 637 W/ HCPCS: Performed by: STUDENT IN AN ORGANIZED HEALTH CARE EDUCATION/TRAINING PROGRAM

## 2020-01-18 PROCEDURE — 85045 AUTOMATED RETICULOCYTE COUNT: CPT

## 2020-01-18 PROCEDURE — 36415 COLL VENOUS BLD VENIPUNCTURE: CPT

## 2020-01-18 PROCEDURE — 80048 BASIC METABOLIC PNL TOTAL CA: CPT

## 2020-01-18 PROCEDURE — 85025 COMPLETE CBC W/AUTO DIFF WBC: CPT

## 2020-01-18 PROCEDURE — 83735 ASSAY OF MAGNESIUM: CPT

## 2020-01-18 PROCEDURE — 25000003 PHARM REV CODE 250: Performed by: HOSPITALIST

## 2020-01-18 RX ORDER — FUROSEMIDE 20 MG/1
20 TABLET ORAL ONCE
Status: COMPLETED | OUTPATIENT
Start: 2020-01-18 | End: 2020-01-18

## 2020-01-18 RX ADMIN — CARBAMAZEPINE 400 MG: 200 TABLET ORAL at 09:01

## 2020-01-18 RX ADMIN — Medication 6 MG: at 08:01

## 2020-01-18 RX ADMIN — ACETAMINOPHEN 1000 MG: 500 TABLET ORAL at 10:01

## 2020-01-18 RX ADMIN — HYDROXYUREA 500 MG: 500 CAPSULE ORAL at 09:01

## 2020-01-18 RX ADMIN — CARVEDILOL 25 MG: 25 TABLET, FILM COATED ORAL at 09:01

## 2020-01-18 RX ADMIN — FUROSEMIDE 20 MG: 20 TABLET ORAL at 12:01

## 2020-01-18 RX ADMIN — SENNOSIDES AND DOCUSATE SODIUM 2 TABLET: 8.6; 5 TABLET ORAL at 09:01

## 2020-01-18 RX ADMIN — ALPRAZOLAM 1 MG: 0.5 TABLET ORAL at 09:01

## 2020-01-18 RX ADMIN — FOLIC ACID 1 MG: 1 TABLET ORAL at 09:01

## 2020-01-18 RX ADMIN — CARBAMAZEPINE 400 MG: 200 TABLET ORAL at 08:01

## 2020-01-18 RX ADMIN — HYDROMORPHONE HYDROCHLORIDE 1 MG: 1 INJECTION, SOLUTION INTRAMUSCULAR; INTRAVENOUS; SUBCUTANEOUS at 08:01

## 2020-01-18 RX ADMIN — ALPRAZOLAM 1 MG: 0.5 TABLET ORAL at 08:01

## 2020-01-18 RX ADMIN — GABAPENTIN 800 MG: 400 CAPSULE ORAL at 09:01

## 2020-01-18 RX ADMIN — ENOXAPARIN SODIUM 40 MG: 100 INJECTION SUBCUTANEOUS at 08:01

## 2020-01-18 RX ADMIN — HYDROXYUREA 500 MG: 500 CAPSULE ORAL at 08:01

## 2020-01-18 RX ADMIN — HYDROMORPHONE HYDROCHLORIDE 1 MG: 1 INJECTION, SOLUTION INTRAMUSCULAR; INTRAVENOUS; SUBCUTANEOUS at 09:01

## 2020-01-18 RX ADMIN — FLUTICASONE FUROATE AND VILANTEROL TRIFENATATE 1 PUFF: 100; 25 POWDER RESPIRATORY (INHALATION) at 10:01

## 2020-01-18 RX ADMIN — FLUOXETINE 40 MG: 20 CAPSULE ORAL at 09:01

## 2020-01-18 RX ADMIN — AMLODIPINE BESYLATE 10 MG: 10 TABLET ORAL at 09:01

## 2020-01-18 RX ADMIN — PROCHLORPERAZINE EDISYLATE 5 MG: 5 INJECTION INTRAMUSCULAR; INTRAVENOUS at 08:01

## 2020-01-18 RX ADMIN — PROCHLORPERAZINE EDISYLATE 5 MG: 5 INJECTION INTRAMUSCULAR; INTRAVENOUS at 09:01

## 2020-01-18 RX ADMIN — CARVEDILOL 25 MG: 25 TABLET, FILM COATED ORAL at 08:01

## 2020-01-18 RX ADMIN — ENOXAPARIN SODIUM 40 MG: 100 INJECTION SUBCUTANEOUS at 09:01

## 2020-01-18 RX ADMIN — GABAPENTIN 800 MG: 400 CAPSULE ORAL at 03:01

## 2020-01-18 RX ADMIN — HYDROCHLOROTHIAZIDE 25 MG: 12.5 TABLET ORAL at 09:01

## 2020-01-18 RX ADMIN — ACETAMINOPHEN 1000 MG: 500 TABLET ORAL at 09:01

## 2020-01-18 RX ADMIN — ACETAMINOPHEN 1000 MG: 500 TABLET ORAL at 03:01

## 2020-01-18 RX ADMIN — GABAPENTIN 800 MG: 400 CAPSULE ORAL at 08:01

## 2020-01-18 NOTE — PROGRESS NOTES
[facility] Hospital Medicine  Progress Note    Patient Name: Nazanin Malone  MRN: [id number]  Patient Class: IP- Inpatient   Admission Date: 1/9/2020  Length of Stay: 9 days  Attending Physician: Selvin Stern MD  Primary Care Provider: [facility] Medicine Team: [facility] MED 4 Waldemar Duffy MD    Subjective:     Principal Problem:Sickle cell disease, type S beta-zero thalassemia with crisis        HPI:  Ms. Malone is a 40 yo female with past medical history of sickle cell beta thalassemia, HTN, depression, and asthma who presents with chief complaint of pain in her legs.  She says that the pain started about 2 days ago and the pain is worse in her right leg than her left. The pain is associated with swelling and is located more in the bone rather than a particular joint. She says the pain is similar to previous sickle cell crises. She has tried her home percocet  mg q4 hours without significant relief. She usually uses this as a telling sign that she needs to come to the hospital. She is also complaining of some fatigue and shortness of breath with exertion. She denies chest pain, cough, congestion, fevers, chills, nausea, vomiting, diarrhea, constipation. She is currently on her menstrual period which is usually heavy. She says that she had a cold a week or two ago which she has recovered from. 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 She has not yet received her flu vaccination.    She follows with Dr. Montgomery for her sickle cell and was most recently saw on 12/27. She has been admitted 3 times in the last year for crisis(most recently in august 2019). Triggers for her usually include illness and changes in the weather. She says she has had acute chest in the past but cannot remember the last time. She cannot remember the last time she has received a blood transfusion. It appears her baseline hemoglobin lies between 9-10. She  has tried hydroxyurea before but did not tolerate/had a rash. Last time she saw Dr. Montgomery, she had low ferritin of 2 and was recommended to be given feraheme. She got one dose this past Tuesday and is scheduled to get another dose on Tuesday. It is suspected that low ferritin 2/2 heavy menstrual bleeding and it was recommended the patient follow up with OBGYN.    ED course: In the ED, the patient is afebrile, hypertensive in 180s and satting 97% on room air. Labs are notable for normal WBC, H/H 9.9/32.8, reticulocyte count 2.0, K 3 and rest of CMP within normal limits. Her UA had 1+ protein and >100 RBCs however patient on menstrual period. CXR obtained shows borderline cardiomegaly, coarsened interstitial lung markings, but no focal consolidation. She was given dilaudid 1 mg x 2, oral potassium, and 2L NS bolus.     Overview/Hospital Course:  Ms. Nazanin Malone was admitted to [facility] on 01/10/20 for sickle cell crisis. She was started on IVF and pain regimen including home PERCOCET and IV DILAUDID for severe pain. Patient also reported menorrhagia much more so than her normal amount (she has heavy menstrual periods usually but this is much worse than normal.) OBGYN was consulted on 01/10 and she was started on Medroxyprogesterone for 7 days to help decrease her bleeding in this current setting; recommended patient follow up OB/GYN outpatient to discuss further options for AUB control including IUD vs hysterectomy. TTE was preformed on 01/10 as patient reported of dyspnea, however it showed EF of 60% with some indeterminate amount of diastolic dysfunction. Patient's H/H continued to drop with increase in her retic count, and suspected anemia was from menstrual period which improved with starting progesterone, folate, and B12 supplementation. Her pain was relative well-controlled but intolerance of decreasing frequency of IV DILAUDID and scheduled PO pain medications, and adding adjunctive pain control with  scheduled Tylenol and Gabapentin. On 01/14,  patient continued to struggle with pain with current regimen, requiring frequent IV breakthrough pain control and she was changed to DILAUDID PCA pump at 0.2mg/bolus every 6 min for a total of 2mg/hr. On 01/15 patient went through 12 mg of IV dilaudid over 16 hours without relief of her lower extremity pain.  Because of difficulty controling patient's pain with conservative measures of pain control and IVF, hematology was consulted on 01/15. Peripheral smear and Hgb electrophoresis were unremarkable for significant sickling of RBC's more than baseline, and exchange transfusion was not recommended. She was re-initiated on Hydroxyurea on 01/16, in addition to XANAX secondary to patient reported over-whelming anxiety related to disease course and hospitalization. Considered ordering MRI of bilateral hips to rule out underlying ATN contributing to pain, however patient declined procedure.  Her hemoglobin has remained stable around ~9 (althought baseline closer to 10-11).  Patient's states that her pain control is much improved on solely PO pain medications; however, she noted some dyspnea while lying bed this morning.  Will continue to diuresis patient with lasix as the fluid she received throughout admission may be causing the dyspnea.     Interval History: NAEON.  Patient on only PO medications for pain, which she is tolerating well.  She did note some mild shortness of breath and asked for a little NC (2L.)   Will diuresis patient more today and assess her breathing as throughout the day,      Review of Systems   Constitutional: Negative for chills and fever.   Respiratory: Positive for shortness of breath. Negative for chest tightness.    Cardiovascular: Positive for leg swelling. Negative for chest pain.   Gastrointestinal: Negative for abdominal distention, abdominal pain, nausea and vomiting.   Musculoskeletal: Negative for joint swelling.     Objective:     Vital  Signs (Most Recent):  Temp: 98.7 °F (37.1 °C) (01/18/20 0908)  Pulse: 81 (01/18/20 0908)  Resp: 17 (01/18/20 0908)  BP: (!) 174/86 (01/18/20 0908)  SpO2: 99 % (01/18/20 0908) Vital Signs (24h Range):  Temp:  [98.4 °F (36.9 °C)-99.6 °F (37.6 °C)] 98.7 °F (37.1 °C)  Pulse:  [] 81  Resp:  [16-20] 17  SpO2:  [96 %-100 %] 99 %  BP: (117-181)/(66-99) 174/86     Weight: (!) 147 kg (324 lb)  Body mass index is 59.26 kg/m².    Intake/Output Summary (Last 24 hours) at 1/18/2020 1039  Last data filed at 1/17/2020 1700  Gross per 24 hour   Intake 240 ml   Output 200 ml   Net 40 ml      Physical Exam   Constitutional: She is oriented to person, place, and time.   Patient is a morbidly obese BF who appears stated age, alert, and in NAD.    HENT:   Head: Normocephalic and atraumatic.   Eyes: Pupils are equal, round, and reactive to light. EOM are normal.   Neck: Normal range of motion. Neck supple.   Cardiovascular: Normal rate, regular rhythm and normal heart sounds.   Pulmonary/Chest: Effort normal and breath sounds normal. No stridor. No respiratory distress. She has no wheezes.   Abdominal: Soft. Bowel sounds are normal. She exhibits no distension. There is no tenderness.   Musculoskeletal: Normal range of motion. She exhibits edema (+1 edema noted BL up to mid shin).   Neurological: She is alert and oriented to person, place, and time.   Skin: Skin is warm and dry. Capillary refill takes less than 2 seconds.   Psychiatric: She has a normal mood and affect.       Significant Labs:   CBC:   Recent Labs   Lab 01/17/20 0738 01/18/20  0730   WBC 5.38 4.99   HGB 9.0* 9.4*   HCT 30.6* 31.2*    179     CMP:   Recent Labs   Lab 01/17/20 0738 01/18/20  0730    142   K 3.8 3.5    107   CO2 30* 27   GLU 92 87   BUN 9 6   CREATININE 0.8 0.7   CALCIUM 8.8 8.8   ANIONGAP 6* 8   EGFRNONAA >60.0 >60.0       Significant Imaging: I have reviewed and interpreted all pertinent imaging results/findings within the past 24  hours.      Assessment/Plan:      * Sickle cell disease, type S beta-zero thalassemia with crisis  This is a 39 year old female with sickle cell beta-thalassemia and prior history of acute chest syndrome who presented on 01/10/20 with bilateral leg pain for two days duration associated with swelling, consistent with her typical pain crises. Lower extremity ultrasound negative for underlying DVT; patient declined MRI to rule out underling ATN. Infectious work-up with unremarkable CXR, UA, influenza, and respiratory viral panel. Hemoglobin electrophoresis consistent with sickle cell SC disease with beta thalassemia with similar numbers to previous testing less than a year ago. Pathology smear showed no signs of increased sickling; hematology recommending against exchange transfusion. Her crisis course was refractory to attempted pain control with PO and IV PRN pain regimen, including scheduled Tylenol and Gabapentin, and she is status post initiation on PCA pump on 01/14 with subsequent increase in pump requirements on 01/15, however complicated by loss of IV access on 01/17 and patient frustration with frequent failed attempts to re-establish.     Plan:  -Pain control: Continue PO regimen with Oxycodone ER 10 mg Q12H with Oxycodone IR 10 mg Q4H PRN for moderate pain and Oxycodone IR 15 mg Q4H PRN for severe pain, with Dilaudid Q4H PRN for severe pain refractory to PO medications.   -NARCAN PRN.   -Continue Acetaminophen 1 g Q6H.   -Continue Gabapentin to 800 mg TID  -Continue Hydroxyurea.   -Bowel regimen ordered.   -Folic acid supplementation  -CBC and reticulocyte count ordered daily.     Menorrhagia with regular cycle  -See iron deficiency anemia.       Folate deficiency  -Levels were 2.9 about 2 weeks prior to admission.  -Patient not on supplementation at home    Plan:  -Continue folic acid 1 mg daily      Intermittent asthma without complication  -History of intermittent asthma for which she takes symbicort  inhaler daily with albuterol or duonebs as needed.  -Presented with four day history of shortness of breath with exertion, improved with rest, with associated leg swelling. No orthopnea or PND. Differential includes asthma exacerbation vs HF vs anemia vs deconditioning vs PE.   -EKG and multiple troponins unremarkable.   -TTE on 12/10 with normal EF and indeterminate diastolic dysfunction.   -Lower extremity ultrasound without DVT.     Plan:   -Continue controller inhaler while inpatient  -Duonebs as needed for shortness of breath          Iron deficiency anemia due to chronic blood loss  -History of menorrhagia and most recently had a ferritin of 2 in 12/2019. She is status post 1 dose of FEREHEME on 01/07/10. She has tried oral iron supplementation in the past but remembers not being able to tolerate .Patient states that she chronically has heavy menstrual periods; however, her current cycle on admission was much heavier than usual.   -Pelvic U/S on 01/10 showed enlarged uterus with normal endometrium     Plan:  -OBGYN consulted for AUB, appreciate recs: D/c Medroxyprogesterone on 1/17 (received 7x days)  -Trend CBC daily  -Outpatient F/U with OBGYN        Trigeminal neuralgia  -Home medication: Carbamazepine 800 mg BID    Plan:  -Continue home Carbamazepine      Essential hypertension  -Home medication: Amlodipine 10 mg daily, COREG 25 mg BID (patient has actually been taking incorrectly 12.5 BID), and HCTZ 25 mg daily.     Plan:  -Patient continues to have fluctuating levels of BP from SBP of 200 to 130.  Likely associated with pain and pain relief with PRN medication. Continue home regimen.   -Hydralazine ordered PRN.         VTE Risk Mitigation (From admission, onward)         Ordered     enoxaparin injection 40 mg  Every 12 hours      01/14/20 1047     Place sequential compression device  Until discontinued      01/10/20 0219     IP VTE HIGH RISK PATIENT  Once      01/10/20 0015                      Waldemar  MD Clive  Department of Hospital Medicine   [facility]

## 2020-01-18 NOTE — ASSESSMENT & PLAN NOTE
-History of menorrhagia and most recently had a ferritin of 2 in 12/2019. She is status post 1 dose of FEREHEME on 01/07/10. She has tried oral iron supplementation in the past but remembers not being able to tolerate .Patient states that she chronically has heavy menstrual periods; however, her current cycle on admission was much heavier than usual.   -Pelvic U/S on 01/10 showed enlarged uterus with normal endometrium     Plan:  -OBGYN consulted for AUB, appreciate recs: D/c Medroxyprogesterone on 1/17 (received 7x days)  -Trend CBC daily  -Outpatient F/U with OBGYN

## 2020-01-18 NOTE — SUBJECTIVE & OBJECTIVE
Interval History: NAEON.  Patient on only PO medications for pain, which she is tolerating well.  She did note some mild shortness of breath and asked for a little NC (2L.)   Will diuresis patient more today and assess her breathing as throughout the day,      Review of Systems   Constitutional: Negative for chills and fever.   Respiratory: Positive for shortness of breath. Negative for chest tightness.    Cardiovascular: Positive for leg swelling. Negative for chest pain.   Gastrointestinal: Negative for abdominal distention, abdominal pain, nausea and vomiting.   Musculoskeletal: Negative for joint swelling.     Objective:     Vital Signs (Most Recent):  Temp: 98.7 °F (37.1 °C) (01/18/20 0908)  Pulse: 81 (01/18/20 0908)  Resp: 17 (01/18/20 0908)  BP: (!) 174/86 (01/18/20 0908)  SpO2: 99 % (01/18/20 0908) Vital Signs (24h Range):  Temp:  [98.4 °F (36.9 °C)-99.6 °F (37.6 °C)] 98.7 °F (37.1 °C)  Pulse:  [] 81  Resp:  [16-20] 17  SpO2:  [96 %-100 %] 99 %  BP: (117-181)/(66-99) 174/86     Weight: (!) 147 kg (324 lb)  Body mass index is 59.26 kg/m².    Intake/Output Summary (Last 24 hours) at 1/18/2020 1039  Last data filed at 1/17/2020 1700  Gross per 24 hour   Intake 240 ml   Output 200 ml   Net 40 ml      Physical Exam   Constitutional: She is oriented to person, place, and time.   Patient is a morbidly obese BF who appears stated age, alert, and in NAD.    HENT:   Head: Normocephalic and atraumatic.   Eyes: Pupils are equal, round, and reactive to light. EOM are normal.   Neck: Normal range of motion. Neck supple.   Cardiovascular: Normal rate, regular rhythm and normal heart sounds.   Pulmonary/Chest: Effort normal and breath sounds normal. No stridor. No respiratory distress. She has no wheezes.   Abdominal: Soft. Bowel sounds are normal. She exhibits no distension. There is no tenderness.   Musculoskeletal: Normal range of motion. She exhibits edema (+1 edema noted BL up to mid shin).   Neurological: She is  alert and oriented to person, place, and time.   Skin: Skin is warm and dry. Capillary refill takes less than 2 seconds.   Psychiatric: She has a normal mood and affect.       Significant Labs:   CBC:   Recent Labs   Lab 01/17/20  0738 01/18/20  0730   WBC 5.38 4.99   HGB 9.0* 9.4*   HCT 30.6* 31.2*    179     CMP:   Recent Labs   Lab 01/17/20  0738 01/18/20  0730    142   K 3.8 3.5    107   CO2 30* 27   GLU 92 87   BUN 9 6   CREATININE 0.8 0.7   CALCIUM 8.8 8.8   ANIONGAP 6* 8   EGFRNONAA >60.0 >60.0       Significant Imaging: I have reviewed and interpreted all pertinent imaging results/findings within the past 24 hours.

## 2020-01-18 NOTE — PLAN OF CARE
Problem: Fall Injury Risk  Goal: Absence of Fall and Fall-Related Injury  Outcome: Ongoing, Progressing     Problem: Adult Inpatient Plan of Care  Goal: Plan of Care Review  Outcome: Ongoing, Progressing  Goal: Patient-Specific Goal (Individualization)  Outcome: Ongoing, Progressing  Goal: Absence of Hospital-Acquired Illness or Injury  Outcome: Ongoing, Progressing  Goal: Optimal Comfort and Wellbeing  Outcome: Ongoing, Progressing  Goal: Readiness for Transition of Care  Outcome: Ongoing, Progressing  Goal: Rounds/Family Conference  Outcome: Ongoing, Progressing     Problem: Acute Neurologic Deterioration (Sickle Cell Disease)  Goal: Absence of Acute Neurologic Symptoms  Outcome: Ongoing, Progressing     Problem: Adjustment to Illness (Sickle Cell Disease)  Goal: Optimal Coping with Sickle Cell Disease  Outcome: Ongoing, Progressing     Problem: Inadequate Tissue Perfusion (Sickle Cell Disease)  Goal: Effective Tissue Perfusion  Outcome: Ongoing, Progressing     Problem: Infection (Sickle Cell Disease)  Goal: Absence of Infection Signs/Symptoms  Outcome: Ongoing, Progressing     Problem: Pain (Sickle Cell Disease)  Goal: Acceptable Pain Control  Outcome: Ongoing, Progressing     Problem: Respiratory Compromise (Sickle Cell Disease)  Goal: Optimal Oxygenation  Outcome: Ongoing, Progressing   No acute events throughout night. Pt AAO X 4; able to express needs.  C/o pain  analgesic given as ordered. Safety maintained.  Bed in low position,  call  light in reach.    Will continue to monitor

## 2020-01-18 NOTE — ASSESSMENT & PLAN NOTE
This is a 39 year old female with sickle cell beta-thalassemia and prior history of acute chest syndrome who presented on 01/10/20 with bilateral leg pain for two days duration associated with swelling, consistent with her typical pain crises. Lower extremity ultrasound negative for underlying DVT; patient declined MRI to rule out underling ATN. Infectious work-up with unremarkable CXR, UA, influenza, and respiratory viral panel. Hemoglobin electrophoresis consistent with sickle cell SC disease with beta thalassemia with similar numbers to previous testing less than a year ago. Pathology smear showed no signs of increased sickling; hematology recommending against exchange transfusion. Her crisis course was refractory to attempted pain control with PO and IV PRN pain regimen, including scheduled Tylenol and Gabapentin, and she is status post initiation on PCA pump on 01/14 with subsequent increase in pump requirements on 01/15, however complicated by loss of IV access on 01/17 and patient frustration with frequent failed attempts to re-establish.     Plan:  -Pain control: Continue PO regimen with Oxycodone ER 10 mg Q12H with Oxycodone IR 10 mg Q4H PRN for moderate pain and Oxycodone IR 15 mg Q4H PRN for severe pain, with Dilaudid Q4H PRN for severe pain refractory to PO medications.   -NARCAN PRN.   -Continue Acetaminophen 1 g Q6H.   -Continue Gabapentin to 800 mg TID  -Continue Hydroxyurea.   -Bowel regimen ordered.   -Folic acid supplementation  -CBC and reticulocyte count ordered daily.

## 2020-01-19 VITALS
HEART RATE: 84 BPM | RESPIRATION RATE: 17 BRPM | BODY MASS INDEX: 53.92 KG/M2 | WEIGHT: 293 LBS | SYSTOLIC BLOOD PRESSURE: 152 MMHG | DIASTOLIC BLOOD PRESSURE: 86 MMHG | TEMPERATURE: 99 F | OXYGEN SATURATION: 99 % | HEIGHT: 62 IN

## 2020-01-19 LAB
ALBUMIN SERPL BCP-MCNC: 3.3 G/DL (ref 3.5–5.2)
ALP SERPL-CCNC: 72 U/L (ref 55–135)
ALT SERPL W/O P-5'-P-CCNC: 21 U/L (ref 10–44)
ANION GAP SERPL CALC-SCNC: 12 MMOL/L (ref 8–16)
AST SERPL-CCNC: 25 U/L (ref 10–40)
BASOPHILS # BLD AUTO: 0.03 K/UL (ref 0–0.2)
BASOPHILS NFR BLD: 0.5 % (ref 0–1.9)
BILIRUB DIRECT SERPL-MCNC: 0.1 MG/DL (ref 0.1–0.3)
BILIRUB SERPL-MCNC: 0.2 MG/DL (ref 0.1–1)
BUN SERPL-MCNC: 6 MG/DL (ref 6–20)
CALCIUM SERPL-MCNC: 8.9 MG/DL (ref 8.7–10.5)
CHLORIDE SERPL-SCNC: 105 MMOL/L (ref 95–110)
CO2 SERPL-SCNC: 25 MMOL/L (ref 23–29)
CREAT SERPL-MCNC: 0.8 MG/DL (ref 0.5–1.4)
DIFFERENTIAL METHOD: ABNORMAL
EOSINOPHIL # BLD AUTO: 0 K/UL (ref 0–0.5)
EOSINOPHIL NFR BLD: 0 % (ref 0–8)
ERYTHROCYTE [DISTWIDTH] IN BLOOD BY AUTOMATED COUNT: 24.9 % (ref 11.5–14.5)
EST. GFR  (AFRICAN AMERICAN): >60 ML/MIN/1.73 M^2
EST. GFR  (NON AFRICAN AMERICAN): >60 ML/MIN/1.73 M^2
GLUCOSE SERPL-MCNC: 130 MG/DL (ref 70–110)
HCT VFR BLD AUTO: 30.5 % (ref 37–48.5)
HGB BLD-MCNC: 9.2 G/DL (ref 12–16)
IMM GRANULOCYTES # BLD AUTO: 0.01 K/UL (ref 0–0.04)
IMM GRANULOCYTES NFR BLD AUTO: 0.2 % (ref 0–0.5)
LYMPHOCYTES # BLD AUTO: 3.1 K/UL (ref 1–4.8)
LYMPHOCYTES NFR BLD: 51.2 % (ref 18–48)
MAGNESIUM SERPL-MCNC: 2 MG/DL (ref 1.6–2.6)
MCH RBC QN AUTO: 20.5 PG (ref 27–31)
MCHC RBC AUTO-ENTMCNC: 30.2 G/DL (ref 32–36)
MCV RBC AUTO: 68 FL (ref 82–98)
MONOCYTES # BLD AUTO: 0.7 K/UL (ref 0.3–1)
MONOCYTES NFR BLD: 10.6 % (ref 4–15)
NEUTROPHILS # BLD AUTO: 2.3 K/UL (ref 1.8–7.7)
NEUTROPHILS NFR BLD: 37.5 % (ref 38–73)
NRBC BLD-RTO: 1 /100 WBC
PHOSPHATE SERPL-MCNC: 3.5 MG/DL (ref 2.7–4.5)
PLATELET # BLD AUTO: 197 K/UL (ref 150–350)
PMV BLD AUTO: ABNORMAL FL (ref 9.2–12.9)
POTASSIUM SERPL-SCNC: 3.6 MMOL/L (ref 3.5–5.1)
PROT SERPL-MCNC: 6.9 G/DL (ref 6–8.4)
RBC # BLD AUTO: 4.49 M/UL (ref 4–5.4)
RETICS/RBC NFR AUTO: 4.7 % (ref 0.5–2.5)
SODIUM SERPL-SCNC: 142 MMOL/L (ref 136–145)
WBC # BLD AUTO: 6.11 K/UL (ref 3.9–12.7)

## 2020-01-19 PROCEDURE — 63600175 PHARM REV CODE 636 W HCPCS: Performed by: HOSPITALIST

## 2020-01-19 PROCEDURE — 80076 HEPATIC FUNCTION PANEL: CPT

## 2020-01-19 PROCEDURE — 25000003 PHARM REV CODE 250: Performed by: STUDENT IN AN ORGANIZED HEALTH CARE EDUCATION/TRAINING PROGRAM

## 2020-01-19 PROCEDURE — 85045 AUTOMATED RETICULOCYTE COUNT: CPT

## 2020-01-19 PROCEDURE — 63600175 PHARM REV CODE 636 W HCPCS: Performed by: STUDENT IN AN ORGANIZED HEALTH CARE EDUCATION/TRAINING PROGRAM

## 2020-01-19 PROCEDURE — 83735 ASSAY OF MAGNESIUM: CPT

## 2020-01-19 PROCEDURE — 85025 COMPLETE CBC W/AUTO DIFF WBC: CPT

## 2020-01-19 PROCEDURE — 99238 HOSP IP/OBS DSCHRG MGMT 30/<: CPT | Mod: ,,, | Performed by: HOSPITALIST

## 2020-01-19 PROCEDURE — 99238 PR HOSPITAL DISCHARGE DAY,<30 MIN: ICD-10-PCS | Mod: ,,, | Performed by: HOSPITALIST

## 2020-01-19 PROCEDURE — 36415 COLL VENOUS BLD VENIPUNCTURE: CPT

## 2020-01-19 PROCEDURE — 80048 BASIC METABOLIC PNL TOTAL CA: CPT

## 2020-01-19 PROCEDURE — 84100 ASSAY OF PHOSPHORUS: CPT

## 2020-01-19 RX ORDER — FOLIC ACID 1 MG/1
1 TABLET ORAL DAILY
Qty: 30 TABLET | Refills: 1 | Status: SHIPPED | OUTPATIENT
Start: 2020-01-19 | End: 2020-03-05 | Stop reason: SDUPTHER

## 2020-01-19 RX ORDER — HYDROXYUREA 500 MG/1
500 CAPSULE ORAL 2 TIMES DAILY
Qty: 60 CAPSULE | Refills: 1 | Status: SHIPPED | OUTPATIENT
Start: 2020-01-19 | End: 2020-01-24 | Stop reason: SDUPTHER

## 2020-01-19 RX ORDER — GABAPENTIN 400 MG/1
800 CAPSULE ORAL 3 TIMES DAILY
Qty: 180 CAPSULE | Refills: 0 | Status: SHIPPED | OUTPATIENT
Start: 2020-01-19 | End: 2020-01-19

## 2020-01-19 RX ORDER — GABAPENTIN 400 MG/1
800 CAPSULE ORAL 3 TIMES DAILY
Qty: 180 CAPSULE | Refills: 0 | Status: SHIPPED | OUTPATIENT
Start: 2020-01-19 | End: 2020-05-11 | Stop reason: SDUPTHER

## 2020-01-19 RX ORDER — OXYCODONE HYDROCHLORIDE 10 MG/1
10 TABLET ORAL EVERY 4 HOURS PRN
Qty: 30 TABLET | Refills: 0 | Status: SHIPPED | OUTPATIENT
Start: 2020-01-19 | End: 2020-01-24

## 2020-01-19 RX ADMIN — FLUOXETINE 40 MG: 20 CAPSULE ORAL at 08:01

## 2020-01-19 RX ADMIN — ENOXAPARIN SODIUM 40 MG: 100 INJECTION SUBCUTANEOUS at 08:01

## 2020-01-19 RX ADMIN — FLUTICASONE FUROATE AND VILANTEROL TRIFENATATE 1 PUFF: 100; 25 POWDER RESPIRATORY (INHALATION) at 08:01

## 2020-01-19 RX ADMIN — GABAPENTIN 800 MG: 400 CAPSULE ORAL at 08:01

## 2020-01-19 RX ADMIN — AMLODIPINE BESYLATE 10 MG: 10 TABLET ORAL at 08:01

## 2020-01-19 RX ADMIN — OXYCODONE HYDROCHLORIDE 10 MG: 10 TABLET ORAL at 01:01

## 2020-01-19 RX ADMIN — HYDROCHLOROTHIAZIDE 25 MG: 12.5 TABLET ORAL at 08:01

## 2020-01-19 RX ADMIN — ACETAMINOPHEN 1000 MG: 500 TABLET ORAL at 08:01

## 2020-01-19 RX ADMIN — OXYCODONE HYDROCHLORIDE 10 MG: 10 TABLET, FILM COATED, EXTENDED RELEASE ORAL at 09:01

## 2020-01-19 RX ADMIN — HYDROMORPHONE HYDROCHLORIDE 1 MG: 1 INJECTION, SOLUTION INTRAMUSCULAR; INTRAVENOUS; SUBCUTANEOUS at 05:01

## 2020-01-19 RX ADMIN — CARVEDILOL 25 MG: 25 TABLET, FILM COATED ORAL at 08:01

## 2020-01-19 RX ADMIN — ACETAMINOPHEN 1000 MG: 500 TABLET ORAL at 03:01

## 2020-01-19 RX ADMIN — GABAPENTIN 800 MG: 400 CAPSULE ORAL at 03:01

## 2020-01-19 RX ADMIN — CARBAMAZEPINE 400 MG: 200 TABLET ORAL at 08:01

## 2020-01-19 RX ADMIN — PROCHLORPERAZINE EDISYLATE 5 MG: 5 INJECTION INTRAMUSCULAR; INTRAVENOUS at 05:01

## 2020-01-19 RX ADMIN — HYDROMORPHONE HYDROCHLORIDE 1 MG: 1 INJECTION, SOLUTION INTRAMUSCULAR; INTRAVENOUS; SUBCUTANEOUS at 12:01

## 2020-01-19 RX ADMIN — HYDROXYUREA 500 MG: 500 CAPSULE ORAL at 08:01

## 2020-01-19 RX ADMIN — FOLIC ACID 1 MG: 1 TABLET ORAL at 08:01

## 2020-01-19 NOTE — DISCHARGE SUMMARY
[facility] Hospital Medicine  Discharge Summary      Patient Name: Nazanin Malone  MRN: [id number]  Admission Date: 1/9/2020  Hospital Length of Stay: 10 days  Discharge Date and Time:  01/19/2020 12:20 PM  Attending Physician: Selvin Stern MD   Discharging Provider: Waldemar Duffy MD  Primary Care Provider: Primary Doctor [facility] Medicine Team: [facility] 4 Waldemar Duffy MD    HPI:   Ms. Malone is a 40 yo female with past medical history of sickle cell beta thalassemia, HTN, depression, and asthma who presents with chief complaint of pain in her legs.  She says that the pain started about 2 days ago and the pain is worse in her right leg than her left. The pain is associated with swelling and is located more in the bone rather than a particular joint. She says the pain is similar to previous sickle cell crises. She has tried her home percocet  mg q4 hours without significant relief. She usually uses this as a telling sign that she needs to come to the hospital. She is also complaining of some fatigue and shortness of breath with exertion. She denies chest pain, cough, congestion, fevers, chills, nausea, vomiting, diarrhea, constipation. She is currently on her menstrual period which is usually heavy. She says that she had a cold a week or two ago which she has recovered from. 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 She has not yet received her flu vaccination.    She follows with Dr. Montgomery for her sickle cell and was most recently saw on 12/27. She has been admitted 3 times in the last year for crisis(most recently in august 2019). Triggers for her usually include illness and changes in the weather. She says she has had acute chest in the past but cannot remember the last time. She cannot remember the last time she has received a blood transfusion. It appears her baseline hemoglobin lies between 9-10. She has tried hydroxyurea before  but did not tolerate/had a rash. Last time she saw Dr. Montgomery, she had low ferritin of 2 and was recommended to be given feraheme. She got one dose this past Tuesday and is scheduled to get another dose on Tuesday. It is suspected that low ferritin 2/2 heavy menstrual bleeding and it was recommended the patient follow up with OBGYN.    ED course: In the ED, the patient is afebrile, hypertensive in 180s and satting 97% on room air. Labs are notable for normal WBC, H/H 9.9/32.8, reticulocyte count 2.0, K 3 and rest of CMP within normal limits. Her UA had 1+ protein and >100 RBCs however patient on menstrual period. CXR obtained shows borderline cardiomegaly, coarsened interstitial lung markings, but no focal consolidation. She was given dilaudid 1 mg x 2, oral potassium, and 2L NS bolus.     * No surgery found *      Hospital Course:   Ms. Nazanin Malone was admitted to [facility] on 01/10/20 for sickle cell crisis. She was started on IVF and pain regimen including home PERCOCET and IV DILAUDID for severe pain. Patient also reported menorrhagia much more so than her normal amount (she has heavy menstrual periods usually but this is much worse than normal.) OBGYN was consulted on 01/10 and she was started on Medroxyprogesterone for 7 days to help decrease her bleeding in this current setting; recommended patient follow up OB/GYN outpatient to discuss further options for AUB control including IUD vs hysterectomy. TTE was preformed on 01/10 as patient reported of dyspnea, however it showed EF of 60% with some indeterminate amount of diastolic dysfunction. Patient's H/H continued to drop with increase in her retic count, and suspected anemia was from menstrual period which improved with starting progesterone, folate, and B12 supplementation. Her pain was relative well-controlled but intolerance of decreasing frequency of IV DILAUDID and scheduled PO pain medications, and adding adjunctive pain control with scheduled  Tylenol and Gabapentin. On 01/14,  patient continued to struggle with pain with current regimen, requiring frequent IV breakthrough pain control and she was changed to DILAUDID PCA pump at 0.2mg/bolus every 6 min for a total of 2mg/hr. On 01/15 patient went through 12 mg of IV dilaudid over 16 hours without relief of her lower extremity pain.  Because of difficulty controling patient's pain with conservative measures of pain control and IVF, hematology was consulted on 01/15. Peripheral smear and Hgb electrophoresis were unremarkable for significant sickling of RBC's more than baseline, and exchange transfusion was not recommended. She was re-initiated on Hydroxyurea on 01/16, in addition to XANAX secondary to patient reported over-whelming anxiety related to disease course and hospitalization. Considered ordering MRI of bilateral hips to rule out underlying ATN contributing to pain, however patient declined procedure.  Her hemoglobin has remained stable around ~9 (althought baseline closer to 10-11).  Patient's states that her pain control is much improved on solely PO pain medications; however, she noted some dyspnea while lying bed this morning.  Will continue to diuresis patient with lasix as the fluid she received throughout admission may be causing the dyspnea.  Patient urinated well with single dose 20mg PO and states her dyspnea is improved.  Will discharge today on her home dose oxycodone and will be continuing hydroxyurea.  Patient to follow up with her PCP in a week to assess pain improvement and toleration of hydroxyurea.  Also placed ambulatory referral to OBGYN for patient to follow up with.       Interval History: NAEON.     Review of Systems   Constitutional: Negative for chills and fever.   Respiratory: Negative for chest tightness and shortness of breath.    Cardiovascular: Negative for chest pain and leg swelling.   Gastrointestinal: Negative for abdominal distention, abdominal pain, nausea and  vomiting.   Musculoskeletal: Negative for joint swelling.     Objective:     Vital Signs (Most Recent):  Temp: 98.1 °F (36.7 °C) (01/19/20 0800)  Pulse: 75 (01/19/20 0848)  Resp: 18 (01/19/20 0848)  BP: 139/75 (01/19/20 0800)  SpO2: 100 % (01/19/20 0800) Vital Signs (24h Range):  Temp:  [98.1 °F (36.7 °C)-99 °F (37.2 °C)] 98.1 °F (36.7 °C)  Pulse:  [68-83] 75  Resp:  [16-20] 18  SpO2:  [97 %-100 %] 100 %  BP: (132-174)/(74-86) 139/75     Weight: (!) 147 kg (324 lb)  Body mass index is 59.26 kg/m².    Intake/Output Summary (Last 24 hours) at 1/19/2020 0902  Last data filed at 1/19/2020 0502  Gross per 24 hour   Intake 1270 ml   Output --   Net 1270 ml      Physical Exam   Constitutional: She is oriented to person, place, and time.   Patient is a morbidly obese BF who appears stated age, alert, and in NAD.    HENT:   Head: Normocephalic and atraumatic.   Eyes: Pupils are equal, round, and reactive to light. EOM are normal.   Neck: Normal range of motion. Neck supple.   Cardiovascular: Normal rate, regular rhythm and normal heart sounds.   Pulmonary/Chest: Effort normal and breath sounds normal. No stridor. No respiratory distress. She has no wheezes.   Abdominal: Soft. Bowel sounds are normal. She exhibits no distension. There is no tenderness.   Musculoskeletal: Normal range of motion. She exhibits no edema.   Neurological: She is alert and oriented to person, place, and time.   Skin: Skin is warm and dry. Capillary refill takes less than 2 seconds.   Psychiatric: She has a normal mood and affect.     Consults:   Consults (From admission, onward)        Status Ordering Provider     Inpatient consult to Gynecology  Once     Provider:  (Not yet assigned)    Completed SONIA KINSEY     Inpatient consult to Hematology/Oncology  Once     Provider:  (Not yet assigned)    Completed JOHN ALFONSO     Inpatient consult to PICC team ([facility])  Once     Provider:  (Not yet assigned)    Completed JANNIE SILVERMAN  cons[facility]  Once     Provider:  (Not yet assigned)    Completed OKSANA REBOLLEDO     Inpatient consult to Social Work/Case Management  Once     Provider:  (Not yet assigned)    Acknowledged JES ROSENBERG          * Sickle cell disease, type S beta-zero thalassemia with crisis  This is a 39 year old female with sickle cell beta-thalassemia and prior history of acute chest syndrome who presented on 01/10/20 with bilateral leg pain for two days duration associated with swelling, consistent with her typical pain crises. Lower extremity ultrasound negative for underlying DVT; patient declined MRI to rule out underling ATN. Infectious work-up with unremarkable CXR, UA, influenza, and respiratory viral panel. Hemoglobin electrophoresis consistent with sickle cell SC disease with beta thalassemia with similar numbers to previous testing less than a year ago. Pathology smear showed no signs of increased sickling; hematology recommending against exchange transfusion. Her crisis course was refractory to attempted pain control with PO and IV PRN pain regimen, including scheduled Tylenol and Gabapentin, and she is status post initiation on PCA pump on 01/14 with subsequent increase in pump requirements on 01/15, however complicated by loss of IV access on 01/17 and patient frustration with frequent failed attempts to re-establish.     Plan:  -Patient states that her pain is improved. Will discharge on home regimen of Oxycodone 10mg q6h PRN  -Continue Gabapentin to 800 mg TID upon discharge   -Continue Hydroxyurea upon discharge.   -Folic acid supplementation      Menorrhagia with regular cycle  -See iron deficiency anemia.       Folate deficiency  -Levels were 2.9 about 2 weeks prior to admission.  -Patient not on supplementation at home    Plan:  -Continue folic acid 1 mg daily      Intermittent asthma without complication  -History of intermittent asthma for which she takes symbicort inhaler daily  with albuterol or duonebs as needed.  -Presented with four day history of shortness of breath with exertion, improved with rest, with associated leg swelling. No orthopnea or PND. Differential includes asthma exacerbation vs HF vs anemia vs deconditioning vs PE.   -EKG and multiple troponins unremarkable.   -TTE on 12/10 with normal EF and indeterminate diastolic dysfunction.   -Lower extremity ultrasound without DVT.     Plan:   -Continue controller inhaler while inpatient  -Duonebs as needed for shortness of breath  - RESOLVED          Iron deficiency anemia due to chronic blood loss  -History of menorrhagia and most recently had a ferritin of 2 in 12/2019. She is status post 1 dose of FEREHEME on 01/07/10. She has tried oral iron supplementation in the past but remembers not being able to tolerate .Patient states that she chronically has heavy menstrual periods; however, her current cycle on admission was much heavier than usual.   -Pelvic U/S on 01/10 showed enlarged uterus with normal endometrium     Plan:  -OBGYN consulted for AUB, appreciate recs: D/c Medroxyprogesterone on 1/17 (received 7x days)  -Trend CBC daily  -Outpatient F/U with OBGYN        Trigeminal neuralgia  -Home medication: Carbamazepine 800 mg BID    Plan:  -Continue home Carbamazepine      Essential hypertension  -Home medication: Amlodipine 10 mg daily, COREG 25 mg BID (patient has actually been taking incorrectly 12.5 BID), and HCTZ 25 mg daily.     Plan:  -continue home dose on discharge      Final Active Diagnoses:    Diagnosis Date Noted POA    PRINCIPAL PROBLEM:  Sickle cell disease, type S beta-zero thalassemia with crisis [D57.419] 04/26/2019 Yes    Menorrhagia with regular cycle [N92.0] 01/11/2020 Yes    Intermittent asthma without complication [J45.20] 01/10/2020 Yes    Folate deficiency [E53.8] 01/10/2020 Yes    Iron deficiency anemia due to chronic blood loss [D50.0] 12/27/2019 Yes    Trigeminal neuralgia [G50.0] 03/20/2018  Yes    Class 3 severe obesity due to excess calories without serious comorbidity with body mass index (BMI) of 50.0 to 59.9 in adult [E66.01, Z68.43] 04/25/2017 Not Applicable    Essential hypertension [I10] 04/16/2017 Yes      Problems Resolved During this Admission:    Diagnosis Date Noted Date Resolved POA    Hypokalemia [E87.6] 01/10/2020 01/14/2020 Yes       Discharged Condition: fair    Disposition: Home or Self Care    Follow Up:  Follow-up Information     Primary Doctor No In 1 week.    Why:  Assess pain            [facility] - Obstetrics and Gynecology In 1 month.    Specialty:  Obstetrics and Gynecology  Contact information:  [address]  [facility] [address] [phone]               Patient Instructions:      Ambulatory Referral to Hematology / Oncology   Referral Priority: Routine Referral Type: Consultation   Referral Reason: Specialty Services Required   Requested Specialty: Hematology and Oncology   Number of Visits Requested: 1     Ambulatory Referral to Obstetrics / Gynecology   Referral Priority: Routine Referral Type: Consultation   Referral Reason: Specialty Services Required   Requested Specialty: Obstetrics and Gynecology   Number of Visits Requested: 1     Diet Adult Regular     Notify your health care provider if you experience any of the following:  temperature >100.4     Notify your health care provider if you experience any of the following:  severe uncontrolled pain     Notify your health care provider if you experience any of the following:  temperature >100.4     Notify your health care provider if you experience any of the following:  severe uncontrolled pain     Notify your health care provider if you experience any of the following:  persistent nausea and vomiting or diarrhea     Activity as tolerated     Activity as tolerated       Significant Diagnostic Studies: Labs:   CMP   Recent Labs   Lab 01/18/20  0730 01/19/20  0533    142   K 3.5 3.6     105   CO2 27 25   GLU 87 130*   BUN 6 6   CREATININE 0.7 0.8   CALCIUM 8.8 8.9   PROT  --  6.9   ALBUMIN  --  3.3*   BILITOT  --  0.2   ALKPHOS  --  72   AST  --  25   ALT  --  21   ANIONGAP 8 12   ESTGFRAFRICA >60.0 >60.0   EGFRNONAA >60.0 >60.0    and CBC   Recent Labs   Lab 01/18/20  0730 01/19/20  0533   WBC 4.99 6.11   HGB 9.4* 9.2*   HCT 31.2* 30.5*    197       Pending Diagnostic Studies:     None         Medications:  Reconciled Home Medications:      Medication List      START taking these medications    folic acid 1 MG tablet  Commonly known as:  FOLVITE  Take 1 tablet (1 mg total) by mouth once daily.     gabapentin 400 MG capsule  Commonly known as:  NEURONTIN  Take 2 capsules (800 mg total) by mouth 3 (three) times daily.     hydroxyurea 500 mg Cap  Commonly known as:  HYDREA  Take 1 capsule (500 mg total) by mouth 2 (two) times daily.     oxyCODONE 10 mg Tab immediate release tablet  Commonly known as:  ROXICODONE  Take 1 tablet (10 mg total) by mouth every 4 (four) hours as needed.        CHANGE how you take these medications    carBAMazepine 200 mg tablet  Commonly known as:  TEGRETOL  Take 4 tablets (800 mg total) by mouth 2 (two) times daily.  What changed:  how much to take     hydroCHLOROthiazide 25 MG tablet  Commonly known as:  HYDRODIURIL  Take 1 tablet (25 mg total) by mouth once daily.  What changed:  Another medication with the same name was removed. Continue taking this medication, and follow the directions you see here.        CONTINUE taking these medications    albuterol 90 mcg/actuation inhaler  Commonly known as:  PROVENTIL/VENTOLIN HFA  Inhale 2 puffs into the lungs every 6 (six) hours as needed for Wheezing or Shortness of Breath. Rescue     albuterol-ipratropium 2.5 mg-0.5 mg/3 mL nebulizer solution  Commonly known as:  DUO-NEB  Take 3 mLs by nebulization every 6 (six) hours as needed for Wheezing or Shortness of Breath. Rescue     amLODIPine 10 MG tablet  Commonly known as:   NORVASC  TAKE 1 TABLET BY MOUTH EVERY DAY     carvedilol 25 MG tablet  Commonly known as:  COREG  Take 1 tablet (25 mg total) by mouth 2 (two) times daily.     FLUoxetine 40 MG capsule  TAKE 1 CAPSULE BY MOUTH EVERY DAY     ondansetron 8 MG Tbdl  Commonly known as:  ZOFRAN-ODT  Dissolve 1 tablet (8 mg total) by mouth every 6 (six) hours as needed.     oxyCODONE-acetaminophen  mg per tablet  Commonly known as:  PERCOCET  Take 1 tablet by mouth every 4 (four) hours as needed for Pain.     prochlorperazine 10 MG tablet  Commonly known as:  COMPAZINE  Take 1 tablet (10 mg total) by mouth every 6 (six) hours as needed.     senna-docusate 8.6-50 mg 8.6-50 mg per tablet  Commonly known as:  PERICOLACE  Take 1 tablet by mouth 2 (two) times daily as needed for Constipation.     Symbicort 80-4.5 mcg/actuation Hfaa  Generic drug:  budesonide-formoterol 80-4.5 mcg  Inhale 2 puffs into the lungs 2 (two) times daily. Controller        STOP taking these medications    Tylenol Extra Strength 500 MG tablet  Generic drug:  acetaminophen            Indwelling Lines/Drains at time of discharge:   Lines/Drains/Airways     None                 Time spent on the discharge of patient: 35 minutes  Patient was seen and examined on the date of discharge and determined to be suitable for discharge.         Waldemar Duffy MD  Department of Hospital Medicine  [facility]

## 2020-01-19 NOTE — SUBJECTIVE & OBJECTIVE
Interval History: NAEON.     Review of Systems   Constitutional: Negative for chills and fever.   Respiratory: Negative for chest tightness and shortness of breath.    Cardiovascular: Negative for chest pain and leg swelling.   Gastrointestinal: Negative for abdominal distention, abdominal pain, nausea and vomiting.   Musculoskeletal: Negative for joint swelling.     Objective:     Vital Signs (Most Recent):  Temp: 98.1 °F (36.7 °C) (01/19/20 0800)  Pulse: 75 (01/19/20 0848)  Resp: 18 (01/19/20 0848)  BP: 139/75 (01/19/20 0800)  SpO2: 100 % (01/19/20 0800) Vital Signs (24h Range):  Temp:  [98.1 °F (36.7 °C)-99 °F (37.2 °C)] 98.1 °F (36.7 °C)  Pulse:  [68-83] 75  Resp:  [16-20] 18  SpO2:  [97 %-100 %] 100 %  BP: (132-174)/(74-86) 139/75     Weight: (!) 147 kg (324 lb)  Body mass index is 59.26 kg/m².    Intake/Output Summary (Last 24 hours) at 1/19/2020 0902  Last data filed at 1/19/2020 0502  Gross per 24 hour   Intake 1270 ml   Output --   Net 1270 ml      Physical Exam   Constitutional: She is oriented to person, place, and time.   Patient is a morbidly obese BF who appears stated age, alert, and in NAD.    HENT:   Head: Normocephalic and atraumatic.   Eyes: Pupils are equal, round, and reactive to light. EOM are normal.   Neck: Normal range of motion. Neck supple.   Cardiovascular: Normal rate, regular rhythm and normal heart sounds.   Pulmonary/Chest: Effort normal and breath sounds normal. No stridor. No respiratory distress. She has no wheezes.   Abdominal: Soft. Bowel sounds are normal. She exhibits no distension. There is no tenderness.   Musculoskeletal: Normal range of motion. She exhibits no edema.   Neurological: She is alert and oriented to person, place, and time.   Skin: Skin is warm and dry. Capillary refill takes less than 2 seconds.   Psychiatric: She has a normal mood and affect.       Significant Labs:   CBC:   Recent Labs   Lab 01/18/20  0730 01/19/20  0533   WBC 4.99 6.11   HGB 9.4* 9.2*   HCT  31.2* 30.5*    197     CMP:   Recent Labs   Lab 01/18/20  0730 01/19/20  0533    142   K 3.5 3.6    105   CO2 27 25   GLU 87 130*   BUN 6 6   CREATININE 0.7 0.8   CALCIUM 8.8 8.9   PROT  --  6.9   ALBUMIN  --  3.3*   BILITOT  --  0.2   ALKPHOS  --  72   AST  --  25   ALT  --  21   ANIONGAP 8 12   EGFRNONAA >60.0 >60.0       Significant Imaging: I have reviewed and interpreted all pertinent imaging results/findings within the past 24 hours.

## 2020-01-19 NOTE — ASSESSMENT & PLAN NOTE
-Home medication: Amlodipine 10 mg daily, COREG 25 mg BID (patient has actually been taking incorrectly 12.5 BID), and HCTZ 25 mg daily.     Plan:  -continue home dose on discharge

## 2020-01-19 NOTE — ASSESSMENT & PLAN NOTE
-History of intermittent asthma for which she takes symbicort inhaler daily with albuterol or duonebs as needed.  -Presented with four day history of shortness of breath with exertion, improved with rest, with associated leg swelling. No orthopnea or PND. Differential includes asthma exacerbation vs HF vs anemia vs deconditioning vs PE.   -EKG and multiple troponins unremarkable.   -TTE on 12/10 with normal EF and indeterminate diastolic dysfunction.   -Lower extremity ultrasound without DVT.     Plan:   -Continue controller inhaler while inpatient  -Duonebs as needed for shortness of breath  - RESOLVED

## 2020-01-19 NOTE — ASSESSMENT & PLAN NOTE
This is a 39 year old female with sickle cell beta-thalassemia and prior history of acute chest syndrome who presented on 01/10/20 with bilateral leg pain for two days duration associated with swelling, consistent with her typical pain crises. Lower extremity ultrasound negative for underlying DVT; patient declined MRI to rule out underling ATN. Infectious work-up with unremarkable CXR, UA, influenza, and respiratory viral panel. Hemoglobin electrophoresis consistent with sickle cell SC disease with beta thalassemia with similar numbers to previous testing less than a year ago. Pathology smear showed no signs of increased sickling; hematology recommending against exchange transfusion. Her crisis course was refractory to attempted pain control with PO and IV PRN pain regimen, including scheduled Tylenol and Gabapentin, and she is status post initiation on PCA pump on 01/14 with subsequent increase in pump requirements on 01/15, however complicated by loss of IV access on 01/17 and patient frustration with frequent failed attempts to re-establish.     Plan:  -Patient states that her pain is improved. Will discharge on home regimen of Oxycodone 10mg q6h PRN  -Continue Gabapentin to 800 mg TID upon discharge   -Continue Hydroxyurea upon discharge.   -Folic acid supplementation

## 2020-01-20 ENCOUNTER — PATIENT MESSAGE (OUTPATIENT)
Dept: HEMATOLOGY/ONCOLOGY | Facility: CLINIC | Age: 40
End: 2020-01-20

## 2020-01-20 ENCOUNTER — TELEPHONE (OUTPATIENT)
Dept: HEMATOLOGY/ONCOLOGY | Facility: HOSPITAL | Age: 40
End: 2020-01-20

## 2020-01-20 ENCOUNTER — TELEPHONE (OUTPATIENT)
Dept: HEMATOLOGY/ONCOLOGY | Facility: CLINIC | Age: 40
End: 2020-01-20

## 2020-01-20 DIAGNOSIS — D57.40 SICKLE CELL BETA THALASSEMIA: ICD-10-CM

## 2020-01-20 DIAGNOSIS — D57.00 SICKLE-CELL DISEASE WITH PAIN: Primary | ICD-10-CM

## 2020-01-20 RX ORDER — OXYCODONE AND ACETAMINOPHEN 10; 325 MG/1; MG/1
1 TABLET ORAL EVERY 4 HOURS PRN
Qty: 120 TABLET | Refills: 0 | Status: SHIPPED | OUTPATIENT
Start: 2020-01-20 | End: 2020-02-10 | Stop reason: SDUPTHER

## 2020-01-20 NOTE — TELEPHONE ENCOUNTER
----- Message from Divina Genao MD sent at 1/20/2020  8:35 AM CST -----  Can this patient be rescheduled for her ferraheme injection at her convenience? She also needs a CBC in 2 weeks, and CBC + NP visit in 4 weeks to monitor her hydroxuyrea dose. She saw Dr. Montgomery last in Dec 2019. Thank you.

## 2020-01-20 NOTE — TELEPHONE ENCOUNTER
Jose BALTAZAR Staff   Cc: Divina Genao MD             Pt called and I scheduled all appts per Dr. Genao. However, pt is requesting port placement. She stated she was stuck over 70 times during her last hospital stay and she is extremely bruised.       Waldemar she is also asking for her oxycodone w/ tylenol be refilled.     Thanks!

## 2020-01-20 NOTE — PLAN OF CARE
Patient discharged to home with family.    Future Appointments   Date Time Provider Department Center   1/21/2020  4:00 PM [facility]        01/20/20 0808   Final Note   Assessment Type Final Discharge Note   Anticipated Discharge Disposition Home   Right Care Referral Info   Post Acute Recommendation No Care

## 2020-01-20 NOTE — TELEPHONE ENCOUNTER
Patient discharged after sickle cell pain crisis. Was started on hydrea during this admission. Scheduling request sent for CBC in 2 weeks, and CBC + NP visit in 4 weeks. Pt also needs to reschedule her missed anneliese apt.

## 2020-01-23 ENCOUNTER — INFUSION (OUTPATIENT)
Dept: INFUSION THERAPY | Facility: HOSPITAL | Age: 40
End: 2020-01-23
Attending: INTERNAL MEDICINE
Payer: COMMERCIAL

## 2020-01-23 VITALS
RESPIRATION RATE: 18 BRPM | SYSTOLIC BLOOD PRESSURE: 138 MMHG | OXYGEN SATURATION: 99 % | TEMPERATURE: 98 F | DIASTOLIC BLOOD PRESSURE: 76 MMHG | HEART RATE: 72 BPM

## 2020-01-23 DIAGNOSIS — D50.0 IRON DEFICIENCY ANEMIA DUE TO CHRONIC BLOOD LOSS: Primary | ICD-10-CM

## 2020-01-23 DIAGNOSIS — D57.00 SICKLE-CELL DISEASE WITH PAIN: ICD-10-CM

## 2020-01-23 DIAGNOSIS — D50.9 IRON DEFICIENCY ANEMIA, UNSPECIFIED IRON DEFICIENCY ANEMIA TYPE: ICD-10-CM

## 2020-01-23 DIAGNOSIS — D57.439 SICKLE CELL DISEASE, TYPE S BETA-ZERO THALASSEMIA WITH CRISIS: ICD-10-CM

## 2020-01-23 PROCEDURE — 25000003 PHARM REV CODE 250: Performed by: INTERNAL MEDICINE

## 2020-01-23 PROCEDURE — 96365 THER/PROPH/DIAG IV INF INIT: CPT

## 2020-01-23 PROCEDURE — A4216 STERILE WATER/SALINE, 10 ML: HCPCS | Performed by: INTERNAL MEDICINE

## 2020-01-23 PROCEDURE — 63600175 PHARM REV CODE 636 W HCPCS: Mod: JG | Performed by: INTERNAL MEDICINE

## 2020-01-23 RX ORDER — HEPARIN 100 UNIT/ML
500 SYRINGE INTRAVENOUS
Status: DISCONTINUED | OUTPATIENT
Start: 2020-01-23 | End: 2020-01-23 | Stop reason: HOSPADM

## 2020-01-23 RX ORDER — SODIUM CHLORIDE 0.9 % (FLUSH) 0.9 %
10 SYRINGE (ML) INJECTION
Status: DISCONTINUED | OUTPATIENT
Start: 2020-01-23 | End: 2020-01-23 | Stop reason: HOSPADM

## 2020-01-23 RX ADMIN — Medication 10 ML: at 03:01

## 2020-01-23 RX ADMIN — FERUMOXYTOL 510 MG: 510 INJECTION INTRAVENOUS at 03:01

## 2020-01-23 NOTE — PLAN OF CARE
Pt admitted for Feraheme Infusion. Labs reviewed and possible side effects discussed. Pt has received  this iron infusion in past and tolerated well.Pt received treatment. AVS given to Pt and Pt discharged @ 15:55

## 2020-01-24 DIAGNOSIS — D57.1 HB-SS DISEASE WITHOUT CRISIS: Primary | ICD-10-CM

## 2020-01-24 RX ORDER — HYDROXYUREA 500 MG/1
500 CAPSULE ORAL 2 TIMES DAILY
Qty: 60 CAPSULE | Refills: 1 | Status: SHIPPED | OUTPATIENT
Start: 2020-01-24 | End: 2020-02-23

## 2020-01-24 NOTE — TELEPHONE ENCOUNTER
----- Message from Bhakti Soto sent at 1/24/2020  9:16 AM CST -----  Contact: pt  Reason:Refill request for  hydroxyurea (HYDREA) 500 mg Cap. Please call when sent Thanks    Communication: [phone]

## 2020-02-03 ENCOUNTER — LAB VISIT (OUTPATIENT)
Dept: LAB | Facility: HOSPITAL | Age: 40
End: 2020-02-03
Payer: COMMERCIAL

## 2020-02-03 DIAGNOSIS — D57.1 HB-SS DISEASE WITHOUT CRISIS: ICD-10-CM

## 2020-02-03 LAB
BASOPHILS # BLD AUTO: 0.02 K/UL (ref 0–0.2)
BASOPHILS NFR BLD: 0.3 % (ref 0–1.9)
DIFFERENTIAL METHOD: ABNORMAL
EOSINOPHIL # BLD AUTO: 0 K/UL (ref 0–0.5)
EOSINOPHIL NFR BLD: 0 % (ref 0–8)
ERYTHROCYTE [DISTWIDTH] IN BLOOD BY AUTOMATED COUNT: 26.5 % (ref 11.5–14.5)
HCT VFR BLD AUTO: 38.2 % (ref 37–48.5)
HGB BLD-MCNC: 11.9 G/DL (ref 12–16)
IMM GRANULOCYTES # BLD AUTO: 0 K/UL (ref 0–0.04)
IMM GRANULOCYTES NFR BLD AUTO: 0 % (ref 0–0.5)
LYMPHOCYTES # BLD AUTO: 2.8 K/UL (ref 1–4.8)
LYMPHOCYTES NFR BLD: 47 % (ref 18–48)
MCH RBC QN AUTO: 21.9 PG (ref 27–31)
MCHC RBC AUTO-ENTMCNC: 31.2 G/DL (ref 32–36)
MCV RBC AUTO: 70 FL (ref 82–98)
MONOCYTES # BLD AUTO: 0.7 K/UL (ref 0.3–1)
MONOCYTES NFR BLD: 11.3 % (ref 4–15)
NEUTROPHILS # BLD AUTO: 2.5 K/UL (ref 1.8–7.7)
NEUTROPHILS NFR BLD: 41.4 % (ref 38–73)
NRBC BLD-RTO: 0 /100 WBC
PLATELET # BLD AUTO: 262 K/UL (ref 150–350)
PMV BLD AUTO: 9.8 FL (ref 9.2–12.9)
RBC # BLD AUTO: 5.44 M/UL (ref 4–5.4)
WBC # BLD AUTO: 6.04 K/UL (ref 3.9–12.7)

## 2020-02-03 PROCEDURE — 36415 COLL VENOUS BLD VENIPUNCTURE: CPT

## 2020-02-03 PROCEDURE — 85025 COMPLETE CBC W/AUTO DIFF WBC: CPT

## 2020-02-06 ENCOUNTER — INFUSION (OUTPATIENT)
Dept: INFUSION THERAPY | Facility: HOSPITAL | Age: 40
End: 2020-02-06
Attending: INTERNAL MEDICINE
Payer: COMMERCIAL

## 2020-02-06 ENCOUNTER — TELEPHONE (OUTPATIENT)
Dept: HEMATOLOGY/ONCOLOGY | Facility: CLINIC | Age: 40
End: 2020-02-06

## 2020-02-06 VITALS
HEART RATE: 82 BPM | WEIGHT: 293 LBS | SYSTOLIC BLOOD PRESSURE: 160 MMHG | RESPIRATION RATE: 18 BRPM | OXYGEN SATURATION: 99 % | BODY MASS INDEX: 53.92 KG/M2 | TEMPERATURE: 99 F | DIASTOLIC BLOOD PRESSURE: 85 MMHG | HEIGHT: 62 IN

## 2020-02-06 DIAGNOSIS — D57.00 HB-SS DISEASE WITH VASO-OCCLUSIVE PAIN: ICD-10-CM

## 2020-02-06 DIAGNOSIS — D57.439 SICKLE CELL DISEASE, TYPE S BETA-ZERO THALASSEMIA WITH CRISIS: Primary | ICD-10-CM

## 2020-02-06 PROCEDURE — 63600175 PHARM REV CODE 636 W HCPCS: Performed by: NURSE PRACTITIONER

## 2020-02-06 PROCEDURE — 96374 THER/PROPH/DIAG INJ IV PUSH: CPT

## 2020-02-06 PROCEDURE — 96361 HYDRATE IV INFUSION ADD-ON: CPT

## 2020-02-06 PROCEDURE — 63600175 PHARM REV CODE 636 W HCPCS: Performed by: INTERNAL MEDICINE

## 2020-02-06 PROCEDURE — 96376 TX/PRO/DX INJ SAME DRUG ADON: CPT

## 2020-02-06 PROCEDURE — 96375 TX/PRO/DX INJ NEW DRUG ADDON: CPT

## 2020-02-06 RX ORDER — HEPARIN 100 UNIT/ML
500 SYRINGE INTRAVENOUS
Status: CANCELLED | OUTPATIENT
Start: 2020-02-06

## 2020-02-06 RX ORDER — SODIUM CHLORIDE 0.9 % (FLUSH) 0.9 %
10 SYRINGE (ML) INJECTION
Status: CANCELLED | OUTPATIENT
Start: 2020-02-06

## 2020-02-06 RX ORDER — DIPHENHYDRAMINE HYDROCHLORIDE 50 MG/ML
25 INJECTION INTRAMUSCULAR; INTRAVENOUS ONCE
Status: CANCELLED
Start: 2020-02-06

## 2020-02-06 RX ORDER — MORPHINE SULFATE 10 MG/ML
3 INJECTION, SOLUTION INTRAMUSCULAR; INTRAVENOUS ONCE
Status: CANCELLED
Start: 2020-02-06

## 2020-02-06 RX ORDER — HEPARIN 100 UNIT/ML
500 SYRINGE INTRAVENOUS
Status: DISCONTINUED | OUTPATIENT
Start: 2020-02-06 | End: 2020-02-06 | Stop reason: HOSPADM

## 2020-02-06 RX ORDER — SODIUM CHLORIDE 0.9 % (FLUSH) 0.9 %
10 SYRINGE (ML) INJECTION
Status: DISCONTINUED | OUTPATIENT
Start: 2020-02-06 | End: 2020-02-06 | Stop reason: HOSPADM

## 2020-02-06 RX ORDER — MORPHINE SULFATE 2 MG/ML
3 INJECTION, SOLUTION INTRAMUSCULAR; INTRAVENOUS ONCE
Status: COMPLETED | OUTPATIENT
Start: 2020-02-06 | End: 2020-02-06

## 2020-02-06 RX ORDER — DIPHENHYDRAMINE HYDROCHLORIDE 50 MG/ML
25 INJECTION INTRAMUSCULAR; INTRAVENOUS ONCE
Status: COMPLETED | OUTPATIENT
Start: 2020-02-06 | End: 2020-02-06

## 2020-02-06 RX ORDER — MORPHINE SULFATE 2 MG/ML
2 INJECTION, SOLUTION INTRAMUSCULAR; INTRAVENOUS ONCE
Status: COMPLETED | OUTPATIENT
Start: 2020-02-06 | End: 2020-02-06

## 2020-02-06 RX ADMIN — SODIUM CHLORIDE 1000 ML: 0.9 INJECTION, SOLUTION INTRAVENOUS at 01:02

## 2020-02-06 RX ADMIN — DIPHENHYDRAMINE HYDROCHLORIDE 25 MG: 50 INJECTION INTRAMUSCULAR; INTRAVENOUS at 01:02

## 2020-02-06 RX ADMIN — MORPHINE SULFATE 3 MG: 2 INJECTION, SOLUTION INTRAMUSCULAR; INTRAVENOUS at 01:02

## 2020-02-06 RX ADMIN — DIPHENHYDRAMINE HYDROCHLORIDE 25 MG: 50 INJECTION INTRAMUSCULAR; INTRAVENOUS at 03:02

## 2020-02-06 RX ADMIN — MORPHINE SULFATE 2 MG: 2 INJECTION, SOLUTION INTRAMUSCULAR; INTRAVENOUS at 03:02

## 2020-02-06 NOTE — PLAN OF CARE
Pt received 1L of NS, MS 5mg, and Benadryl 50mg today and tolerated well, without complications. VSS throughout infusion. Educated patient about 1L of NS, MS 5mg, and Benadryl 50mg (indications, side effects, possible reactions, precautions) and verbalized understanding. PIV positive for blood return, saline locked and removed prior to DC, catheter tip intact. Pt DC with no distress noted, ambulated off of unit to the care of mom, and mom to drive.

## 2020-02-06 NOTE — TELEPHONE ENCOUNTER
Pt reporting generalized pain, requesting fluids and pain medication. Scheduled and confirmed appointment with pt.

## 2020-02-10 DIAGNOSIS — D57.40 SICKLE CELL BETA THALASSEMIA: ICD-10-CM

## 2020-02-11 RX ORDER — OXYCODONE AND ACETAMINOPHEN 10; 325 MG/1; MG/1
1 TABLET ORAL EVERY 4 HOURS PRN
Qty: 120 TABLET | Refills: 0 | Status: SHIPPED | OUTPATIENT
Start: 2020-02-11 | End: 2020-03-02 | Stop reason: SDUPTHER

## 2020-02-18 ENCOUNTER — TELEPHONE (OUTPATIENT)
Dept: HEMATOLOGY/ONCOLOGY | Facility: CLINIC | Age: 40
End: 2020-02-18

## 2020-02-18 NOTE — TELEPHONE ENCOUNTER
"----- Message from Patricia Alejo sent at 2/18/2020 12:01 PM CST -----  Contact: Nazanin  Scheduling Request    Patient Status: Established  Scheduling Appt : Self  Time/Date Preference: Any day this week  MyChart Active User?: yes  Relationship to Patient?: Self  Contact Preference?:[phone]  Treating Provider: Dr. Montgomery  Do you feel you need to be seen today? No     Additional Notes:  Request to reschedule missed appointments from yesterday  "Thank you for all that you do for our patients'"      "

## 2020-02-21 ENCOUNTER — TELEPHONE (OUTPATIENT)
Dept: HEMATOLOGY/ONCOLOGY | Facility: CLINIC | Age: 40
End: 2020-02-21

## 2020-02-21 NOTE — TELEPHONE ENCOUNTER
"----- Message from Patricia Alejo sent at 2/21/2020  1:45 PM CST -----  Returning a Missed Scheduling Call    Contact Preference: [phone]  Patient returning phone call to: Jose Longoria    Appointment Type:  Provider: Chelsea Jones NP  Does patient feel the need to be seen today? No    Additional Notes:  Please contact to schedule an appointment   "Thank you for all that you do for our patients'"  "

## 2020-02-24 ENCOUNTER — TELEPHONE (OUTPATIENT)
Dept: HEMATOLOGY/ONCOLOGY | Facility: CLINIC | Age: 40
End: 2020-02-24

## 2020-02-24 NOTE — TELEPHONE ENCOUNTER
"----- Message from Patricia Alejo sent at 2/24/2020 11:18 AM CST -----  Contact: Nazanin   Returning a Missed Scheduling Call    Contact Preference: [phone]  Patient returning phone call to:  Jose Longoria  Appointment Type:  Provider: Pee Montgomery MD  Does patient feel the need to be seen today? No    Additional Notes:  "Thank you for all that you do for our patients'"  "

## 2020-03-02 DIAGNOSIS — D57.40 SICKLE CELL BETA THALASSEMIA: ICD-10-CM

## 2020-03-03 ENCOUNTER — TELEPHONE (OUTPATIENT)
Dept: HEMATOLOGY/ONCOLOGY | Facility: CLINIC | Age: 40
End: 2020-03-03

## 2020-03-03 RX ORDER — OXYCODONE AND ACETAMINOPHEN 10; 325 MG/1; MG/1
1 TABLET ORAL EVERY 4 HOURS PRN
Qty: 120 TABLET | Refills: 0 | Status: SHIPPED | OUTPATIENT
Start: 2020-03-03 | End: 2020-03-20 | Stop reason: SDUPTHER

## 2020-03-03 NOTE — TELEPHONE ENCOUNTER
----- Message from Jarett Euceda sent at 3/3/2020  9:17 AM CST -----  Contact: Patient  Refill or New Rx: Refill    RX Name and Strength: oxyCODONE-acetaminophen (PERCOCET)  mg per tablet    Preferred Pharmacy with phone number: [facility]    Ordering Provider: Ulises Brady Call Back Number: [phone]    Additional Information:

## 2020-03-04 ENCOUNTER — TELEPHONE (OUTPATIENT)
Dept: HEMATOLOGY/ONCOLOGY | Facility: CLINIC | Age: 40
End: 2020-03-04

## 2020-03-04 ENCOUNTER — INFUSION (OUTPATIENT)
Dept: INFUSION THERAPY | Facility: HOSPITAL | Age: 40
End: 2020-03-04
Attending: INTERNAL MEDICINE
Payer: MEDICAID

## 2020-03-04 VITALS
RESPIRATION RATE: 18 BRPM | DIASTOLIC BLOOD PRESSURE: 79 MMHG | HEART RATE: 84 BPM | SYSTOLIC BLOOD PRESSURE: 172 MMHG | TEMPERATURE: 99 F

## 2020-03-04 DIAGNOSIS — D57.00 HB-SS DISEASE WITH VASO-OCCLUSIVE PAIN: Primary | ICD-10-CM

## 2020-03-04 PROCEDURE — 96360 HYDRATION IV INFUSION INIT: CPT

## 2020-03-04 PROCEDURE — 96374 THER/PROPH/DIAG INJ IV PUSH: CPT

## 2020-03-04 PROCEDURE — 96375 TX/PRO/DX INJ NEW DRUG ADDON: CPT

## 2020-03-04 PROCEDURE — 63600175 PHARM REV CODE 636 W HCPCS: Performed by: NURSE PRACTITIONER

## 2020-03-04 PROCEDURE — 96376 TX/PRO/DX INJ SAME DRUG ADON: CPT

## 2020-03-04 PROCEDURE — 25000003 PHARM REV CODE 250: Performed by: NURSE PRACTITIONER

## 2020-03-04 PROCEDURE — 96361 HYDRATE IV INFUSION ADD-ON: CPT

## 2020-03-04 RX ORDER — SODIUM CHLORIDE 0.9 % (FLUSH) 0.9 %
10 SYRINGE (ML) INJECTION
Status: CANCELLED | OUTPATIENT
Start: 2020-03-04

## 2020-03-04 RX ORDER — SODIUM CHLORIDE 0.9 % (FLUSH) 0.9 %
10 SYRINGE (ML) INJECTION
Status: DISCONTINUED | OUTPATIENT
Start: 2020-03-04 | End: 2020-03-04 | Stop reason: HOSPADM

## 2020-03-04 RX ORDER — HEPARIN 100 UNIT/ML
500 SYRINGE INTRAVENOUS
Status: DISCONTINUED | OUTPATIENT
Start: 2020-03-04 | End: 2020-03-04 | Stop reason: HOSPADM

## 2020-03-04 RX ORDER — MORPHINE SULFATE 2 MG/ML
3 INJECTION, SOLUTION INTRAMUSCULAR; INTRAVENOUS ONCE
Status: CANCELLED | OUTPATIENT
Start: 2020-03-04

## 2020-03-04 RX ORDER — HEPARIN 100 UNIT/ML
500 SYRINGE INTRAVENOUS
Status: CANCELLED | OUTPATIENT
Start: 2020-03-04

## 2020-03-04 RX ORDER — MORPHINE SULFATE 2 MG/ML
2 INJECTION, SOLUTION INTRAMUSCULAR; INTRAVENOUS ONCE
Status: COMPLETED | OUTPATIENT
Start: 2020-03-04 | End: 2020-03-04

## 2020-03-04 RX ORDER — DIPHENHYDRAMINE HYDROCHLORIDE 50 MG/ML
25 INJECTION INTRAMUSCULAR; INTRAVENOUS ONCE
Status: COMPLETED | OUTPATIENT
Start: 2020-03-04 | End: 2020-03-04

## 2020-03-04 RX ORDER — MORPHINE SULFATE 2 MG/ML
3 INJECTION, SOLUTION INTRAMUSCULAR; INTRAVENOUS ONCE
Status: COMPLETED | OUTPATIENT
Start: 2020-03-04 | End: 2020-03-04

## 2020-03-04 RX ORDER — DIPHENHYDRAMINE HYDROCHLORIDE 50 MG/ML
25 INJECTION INTRAMUSCULAR; INTRAVENOUS ONCE
Status: CANCELLED | OUTPATIENT
Start: 2020-03-04

## 2020-03-04 RX ADMIN — MORPHINE SULFATE 3 MG: 2 INJECTION, SOLUTION INTRAMUSCULAR; INTRAVENOUS at 02:03

## 2020-03-04 RX ADMIN — SODIUM CHLORIDE 1000 ML: 0.9 INJECTION, SOLUTION INTRAVENOUS at 02:03

## 2020-03-04 RX ADMIN — DIPHENHYDRAMINE HYDROCHLORIDE 25 MG: 50 INJECTION INTRAMUSCULAR; INTRAVENOUS at 02:03

## 2020-03-04 RX ADMIN — MORPHINE SULFATE 2 MG: 2 INJECTION, SOLUTION INTRAMUSCULAR; INTRAVENOUS at 03:03

## 2020-03-04 NOTE — TELEPHONE ENCOUNTER
Pt reporting bilateral leg and hip pain. Scheduled pt for IV fluids and pain meds at 1400. Confirmed apt time with pt.

## 2020-03-04 NOTE — NURSING
Continues to c/o pain 8/10 to bilateral hips roberto lunsford rn notified. New orders received per PARTHA White NP for morphine 2mg IVP x1 now. Administered to pt and monitored x 30 min prior to d/c home.

## 2020-03-04 NOTE — PLAN OF CARE
Problem: Adult Inpatient Plan of Care  Goal: Optimal Comfort and Wellbeing  Intervention: Provide Person-Centered Care  Flowsheets (Taken 3/4/2020 1415)  Trust Relationship/Rapport: care explained; choices provided; emotional support provided; empathic listening provided; questions answered; questions encouraged; reassurance provided; thoughts/feelings acknowledged

## 2020-03-04 NOTE — TELEPHONE ENCOUNTER
----- Message from Lola Ellington MA sent at 3/4/2020  8:25 AM CST -----  Contact: self/[phone]  Pt is requesting same day appt. Per PT is in lots of pain.

## 2020-03-04 NOTE — PLAN OF CARE
Prior to d/c pt continues to c/o pain 8/10 to hips. Lucy BLANCO notified new orders received medication administered. Pt tolerated 2nd dosage of morphine 2mg/IVP as ordered well. No further c/o pain, monitored appropriately. IVF and benadryl administered without adverse reaction. Leaves clinic ambulatory accompanied by family brother will drive pt home. NAD.

## 2020-03-05 ENCOUNTER — LAB VISIT (OUTPATIENT)
Dept: LAB | Facility: HOSPITAL | Age: 40
End: 2020-03-05
Payer: MEDICAID

## 2020-03-05 ENCOUNTER — OFFICE VISIT (OUTPATIENT)
Dept: HEMATOLOGY/ONCOLOGY | Facility: CLINIC | Age: 40
End: 2020-03-05
Payer: MEDICAID

## 2020-03-05 VITALS
HEIGHT: 62 IN | TEMPERATURE: 99 F | SYSTOLIC BLOOD PRESSURE: 126 MMHG | OXYGEN SATURATION: 100 % | WEIGHT: 293 LBS | BODY MASS INDEX: 53.92 KG/M2 | DIASTOLIC BLOOD PRESSURE: 65 MMHG | RESPIRATION RATE: 18 BRPM | HEART RATE: 85 BPM

## 2020-03-05 DIAGNOSIS — R11.0 NAUSEA: ICD-10-CM

## 2020-03-05 DIAGNOSIS — D57.00 SICKLE-CELL DISEASE WITH PAIN: ICD-10-CM

## 2020-03-05 DIAGNOSIS — D57.00 SICKLE-CELL DISEASE WITH PAIN: Primary | ICD-10-CM

## 2020-03-05 DIAGNOSIS — I10 HYPERTENSION, UNSPECIFIED TYPE: ICD-10-CM

## 2020-03-05 DIAGNOSIS — D50.0 IRON DEFICIENCY ANEMIA DUE TO CHRONIC BLOOD LOSS: ICD-10-CM

## 2020-03-05 LAB
BASOPHILS # BLD AUTO: 0.03 K/UL (ref 0–0.2)
BASOPHILS NFR BLD: 0.5 % (ref 0–1.9)
DIFFERENTIAL METHOD: ABNORMAL
EOSINOPHIL # BLD AUTO: 0 K/UL (ref 0–0.5)
EOSINOPHIL NFR BLD: 0 % (ref 0–8)
ERYTHROCYTE [DISTWIDTH] IN BLOOD BY AUTOMATED COUNT: 25.2 % (ref 11.5–14.5)
HCT VFR BLD AUTO: 36 % (ref 37–48.5)
HGB BLD-MCNC: 11.4 G/DL (ref 12–16)
IMM GRANULOCYTES # BLD AUTO: 0.01 K/UL (ref 0–0.04)
IMM GRANULOCYTES NFR BLD AUTO: 0.2 % (ref 0–0.5)
LYMPHOCYTES # BLD AUTO: 2.4 K/UL (ref 1–4.8)
LYMPHOCYTES NFR BLD: 44.5 % (ref 18–48)
MCH RBC QN AUTO: 24.1 PG (ref 27–31)
MCHC RBC AUTO-ENTMCNC: 31.7 G/DL (ref 32–36)
MCV RBC AUTO: 76 FL (ref 82–98)
MONOCYTES # BLD AUTO: 0.6 K/UL (ref 0.3–1)
MONOCYTES NFR BLD: 10.2 % (ref 4–15)
NEUTROPHILS # BLD AUTO: 2.4 K/UL (ref 1.8–7.7)
NEUTROPHILS NFR BLD: 44.6 % (ref 38–73)
NRBC BLD-RTO: 1 /100 WBC
PLATELET # BLD AUTO: 213 K/UL (ref 150–350)
PMV BLD AUTO: 8.8 FL (ref 9.2–12.9)
RBC # BLD AUTO: 4.74 M/UL (ref 4–5.4)
WBC # BLD AUTO: 5.48 K/UL (ref 3.9–12.7)

## 2020-03-05 PROCEDURE — 99215 OFFICE O/P EST HI 40 MIN: CPT | Mod: PBBFAC | Performed by: NURSE PRACTITIONER

## 2020-03-05 PROCEDURE — 99999 PR PBB SHADOW E&M-EST. PATIENT-LVL V: ICD-10-PCS | Mod: PBBFAC,,, | Performed by: NURSE PRACTITIONER

## 2020-03-05 PROCEDURE — 99214 PR OFFICE/OUTPT VISIT, EST, LEVL IV, 30-39 MIN: ICD-10-PCS | Mod: S$PBB,,, | Performed by: NURSE PRACTITIONER

## 2020-03-05 PROCEDURE — 99999 PR PBB SHADOW E&M-EST. PATIENT-LVL V: CPT | Mod: PBBFAC,,, | Performed by: NURSE PRACTITIONER

## 2020-03-05 PROCEDURE — 99214 OFFICE O/P EST MOD 30 MIN: CPT | Mod: S$PBB,,, | Performed by: NURSE PRACTITIONER

## 2020-03-05 PROCEDURE — 36415 COLL VENOUS BLD VENIPUNCTURE: CPT

## 2020-03-05 PROCEDURE — 85025 COMPLETE CBC W/AUTO DIFF WBC: CPT

## 2020-03-05 RX ORDER — CYCLOBENZAPRINE HCL 10 MG
10 TABLET ORAL 3 TIMES DAILY PRN
COMMUNITY
Start: 2020-02-26 | End: 2020-06-22

## 2020-03-05 RX ORDER — FLUTICASONE FUROATE AND VILANTEROL TRIFENATATE 100; 25 UG/1; UG/1
POWDER RESPIRATORY (INHALATION)
Status: ON HOLD | COMMUNITY
Start: 2020-02-26 | End: 2020-06-08

## 2020-03-05 RX ORDER — HYDROXYUREA 500 MG/1
500 CAPSULE ORAL 2 TIMES DAILY
Qty: 60 CAPSULE | Refills: 3
Start: 2020-03-05 | End: 2020-03-27 | Stop reason: SDUPTHER

## 2020-03-05 RX ORDER — ONDANSETRON 8 MG/1
8 TABLET, ORALLY DISINTEGRATING ORAL EVERY 6 HOURS PRN
Qty: 30 TABLET | Refills: 2 | Status: ON HOLD | OUTPATIENT
Start: 2020-03-05 | End: 2020-06-08

## 2020-03-05 RX ORDER — FOLIC ACID 1 MG/1
1 TABLET ORAL DAILY
Qty: 30 TABLET | Refills: 1 | Status: SHIPPED | OUTPATIENT
Start: 2020-03-05 | End: 2020-03-30

## 2020-03-05 RX ORDER — PROMETHAZINE HYDROCHLORIDE 25 MG/1
25 TABLET ORAL EVERY 6 HOURS PRN
Qty: 30 TABLET | Refills: 2 | Status: SHIPPED | OUTPATIENT
Start: 2020-03-05 | End: 2020-04-13 | Stop reason: SDUPTHER

## 2020-03-05 NOTE — PROGRESS NOTES
SECTION OF HEMATOLOGY AND BONE MARROW TRANSPLANT  Return  Patient Visit   03/05/2020  Referred by:  No ref. provider found  Referred for: sickle beta thal     CHIEF COMPLAINT:   Chief Complaint   Patient presents with    Follow-up     sicle cell anemia       HISTORY OF PRESENT ILLNESS:   39 y.o. 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  She established care with  in February 2017. She has moved around country ([place], [place], [place], now back in [place] permanently) and had hematologists taking care of her in each of these respective cities.  Her disease is notable for 1-3 VOC a year requiring ED presentation and admission.  She has episode of acute chest syndrome as young girl.  States she had subclinical stroke with no residual deficits. Has never been on hydrea. Has history of HTN. Has 2 children. Trained as LPN . Has only required rare transfusion over the course of her life. Pain crises frequently.        RECENT HOSPITAL ADMISSION:  was admitted to [facility] on 01/10/20 for sickle cell crisis. She was started on IVF and pain regimen including home PERCOCET and IV DILAUDID for severe pain. Patient also reported menorrhagia much more so than her normal amount (she has heavy menstrual periods usually but this is much worse than normal.) OBGYN was consulted on 01/10 and she was started on Medroxyprogesterone for 7 days to help decrease her bleeding in this current setting; recommended patient follow up OB/GYN outpatient to discuss further options for AUB control including IUD vs hysterectomy. TTE was preformed on 01/10 as patient reported of dyspnea, however it showed EF of 60% with some indeterminate amount of diastolic dysfunction. Patient's H/H continued to drop with increase in her retic count, and suspected anemia was from menstrual period which improved with starting progesterone, folate, and B12 supplementation. Her pain was relative well-controlled but  intolerance of decreasing frequency of IV DILAUDID and scheduled PO pain medications, and adding adjunctive pain control with scheduled Tylenol and Gabapentin. On 01/14,  patient continued to struggle with pain with current regimen, requiring frequent IV breakthrough pain control and she was changed to DILAUDID PCA pump at 0.2mg/bolus every 6 min for a total of 2mg/hr. On 01/15 patient went through 12 mg of IV dilaudid over 16 hours without relief of her lower extremity pain.  Because of difficulty controling patient's pain with conservative measures of pain control and IVF, hematology was consulted on 01/15. Peripheral smear and Hgb electrophoresis were unremarkable for significant sickling of RBC's more than baseline, and exchange transfusion was not recommended. She was re-initiated on Hydroxyurea on 01/16, in addition to XANAX secondary to patient reported over-whelming anxiety related to disease course and hospitalization. Considered ordering MRI of bilateral hips to rule out underlying ATN contributing to pain, however patient declined procedure.  Her hemoglobin has remained stable around ~9 (althought baseline closer to 10-11).  Patient's states that her pain control is much improved on solely PO pain medications; however, she noted some dyspnea while lying bed this morning.  Will continue to diuresis patient with lasix as the fluid she received throughout admission may be causing the dyspnea.  Patient urinated well with single dose 20mg PO and states her dyspnea is improved.  Will discharge today on her home dose oxycodone and will be continuing hydroxyurea.  Patient to follow up with her PCP in a week to assess pain improvement and toleration of hydroxyurea.  Also placed ambulatory referral to OBGYN for patient to follow up with    Patient presents today for hospital follow-up. She received IVF and IV pain meds at infusion yesterday. Today patient feels well. She is concerned however because she states she  has been having more frequent pain crises recently. She was discharged on Hydrea and states she has been having nausea with Hydrea. She denies rashes,diarrhea, vomiting, sob dizziness.     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 She has never used smokeless tobacco. She reports that she does not drink alcohol or use drugs.    FAMILY HISTORY:  Family History   Problem Relation Age of Onset    Heart disease Mother     Diabetes Mother     Heart disease Father     Breast cancer Neg Hx     Ovarian cancer Neg Hx     Colon cancer Neg Hx        CURRENT MEDICATIONS:   Current Outpatient Medications   Medication Sig    albuterol (VENTOLIN HFA) 90 mcg/actuation inhaler Inhale 2 puffs into the lungs every 6 (six) hours as needed for Wheezing or Shortness of Breath. Rescue    albuterol-ipratropium (DUO-NEB) 2.5 mg-0.5 mg/3 mL nebulizer solution Take 3 mLs by nebulization every 6 (six) hours as needed for Wheezing or Shortness of Breath. Rescue    amLODIPine (NORVASC) 10 MG tablet TAKE 1 TABLET BY MOUTH EVERY DAY    BREO ELLIPTA 100-25 mcg/dose diskus inhaler     budesonide-formoterol 80-4.5 mcg (SYMBICORT) 80-4.5 mcg/actuation HFAA Inhale 2 puffs into the lungs 2 (two) times daily. Controller    carBAMazepine (TEGRETOL) 200 mg tablet Take 4 tablets (800 mg  total) by mouth 2 (two) times daily. (Patient taking differently: Take 400 mg by mouth 2 (two) times daily. )    carvedilol (COREG) 25 MG tablet Take 1 tablet (25 mg total) by mouth 2 (two) times daily.    cyclobenzaprine (FLEXERIL) 10 MG tablet     FLUoxetine 40 MG capsule TAKE 1 CAPSULE BY MOUTH EVERY DAY    hydroCHLOROthiazide (HYDRODIURIL) 25 MG tablet Take 1 tablet (25 mg total) by mouth once daily.    ondansetron (ZOFRAN-ODT) 8 MG TbDL Dissolve 1 tablet (8 mg total) by mouth every 6 (six) hours as needed.    oxyCODONE-acetaminophen (PERCOCET)  mg per tablet Take 1 tablet by mouth every 4 (four) hours as needed for Pain.    senna-docusate 8.6-50 mg (PERICOLACE) 8.6-50 mg per tablet Take 1 tablet by mouth 2 (two) times daily as needed for Constipation.    folic acid (FOLVITE) 1 MG tablet Take 1 tablet (1 mg total) by mouth once daily.    gabapentin (NEURONTIN) 400 MG capsule Take 2 capsules (800 mg total) by mouth 3 (three) times daily.    hydroxyurea (HYDREA) 500 mg Cap Take 1 capsule (500 mg total) by mouth 2 (two) times daily.    promethazine (PHENERGAN) 25 MG tablet Take 1 tablet (25 mg total) by mouth every 6 (six) hours as needed for Nausea.     No current facility-administered medications for this visit.      ALLERGIES:   Review of patient's allergies indicates:  No Known Allergies      REVIEW OF SYSTEMS:   General ROS: negative  Psychological ROS: negative  Ophthalmic ROS: negative  ENT ROS: negative  Allergy and Immunology ROS: negative  Hematological and Lymphatic ROS: negative  Endocrine ROS: negative  Respiratory ROS: negative  Cardiovascular ROS: negative  Gastrointestinal ROS: negative  Genito-Urinary ROS: negative  Musculoskeletal ROS: negative  Neurological ROS: negative  Dermatological ROS: negative    PHYSICAL EXAM:   Vitals:    03/05/20 1355   BP: 126/65   Pulse: 85   Resp: 18   Temp: 98.8 °F (37.1 °C)       General - well developed, well nourished, no apparent distress,  Obese  Head & Face - no sinus tenderness  Eyes - normal conjunctivae and lids   ENT - normal external auditory canals and tympanic membranes bilaterally oropharynx clear,  Normal dentition and gums  Neck - normal thyroid  Chest and Lung - normal respiratory effort, clear to auscultation bilaterally   Cardiovascular - RRR with no MGR, normal S1 and S2; no pedal edema  Abdomen -  soft, nontender, no palpable hepatomegaly or splenomegaly  Lymph - no palpable lymphadenopathy  Extremities - unremarkable nails and digits  Heme - no bruising, petechiae, pallor  Skin - no rashes or lesions  Psych - appropriate mood and affect      ECOG Performance Status: (foot note - ECOG PS provided by Eastern Cooperative Oncology Group) 1 - Symptomatic but completely ambulatory    Karnofsky Performance Score:  90%- Able to Carry on Normal Activity: Minor Symptoms of Disease  DATA:   Lab Results   Component Value Date    WBC 5.48 03/05/2020    HGB 11.4 (L) 03/05/2020    HCT 36.0 (L) 03/05/2020    MCV 76 (L) 03/05/2020     03/05/2020     Gran # (ANC)   Date Value Ref Range Status   03/05/2020 2.4 1.8 - 7.7 K/uL Final     Gran%   Date Value Ref Range Status   03/05/2020 44.6 38.0 - 73.0 % Final     Lymph #   Date Value Ref Range Status   03/05/2020 2.4 1.0 - 4.8 K/uL Final     Lymph%   Date Value Ref Range Status   03/05/2020 44.5 18.0 - 48.0 % Final     CMP  Sodium   Date Value Ref Range Status   01/19/2020 142 136 - 145 mmol/L Final     Potassium   Date Value Ref Range Status   01/19/2020 3.6 3.5 - 5.1 mmol/L Final     Chloride   Date Value Ref Range Status   01/19/2020 105 95 - 110 mmol/L Final     CO2   Date Value Ref Range Status   01/19/2020 25 23 - 29 mmol/L Final     Glucose   Date Value Ref Range Status   01/19/2020 130 (H) 70 - 110 mg/dL Final     BUN, Bld   Date Value Ref Range Status   01/19/2020 6 6 - 20 mg/dL Final     Creatinine   Date Value Ref Range Status   01/19/2020 0.8 0.5 - 1.4 mg/dL Final     Calcium   Date  Value Ref Range Status   01/19/2020 8.9 8.7 - 10.5 mg/dL Final     Total Protein   Date Value Ref Range Status   01/19/2020 6.9 6.0 - 8.4 g/dL Final     Albumin   Date Value Ref Range Status   01/19/2020 3.3 (L) 3.5 - 5.2 g/dL Final     Total Bilirubin   Date Value Ref Range Status   01/19/2020 0.2 0.1 - 1.0 mg/dL Final     Comment:     For infants and newborns, interpretation of results should be based  on gestational age, weight and in agreement with clinical  observations.  Premature Infant recommended reference ranges:  Up to 24 hours.............<8.0 mg/dL  Up to 48 hours............<12.0 mg/dL  3-5 days..................<15.0 mg/dL  6-29 days.................<15.0 mg/dL       Alkaline Phosphatase   Date Value Ref Range Status   01/19/2020 72 55 - 135 U/L Final     AST   Date Value Ref Range Status   01/19/2020 25 10 - 40 U/L Final     ALT   Date Value Ref Range Status   01/19/2020 21 10 - 44 U/L Final     Anion Gap   Date Value Ref Range Status   01/19/2020 12 8 - 16 mmol/L Final     eGFR if    Date Value Ref Range Status   01/19/2020 >60.0 >60 mL/min/1.73 m^2 Final     eGFR if non    Date Value Ref Range Status   01/19/2020 >60.0 >60 mL/min/1.73 m^2 Final     Comment:     Calculation used to obtain the estimated glomerular filtration  rate (eGFR) is the CKD-EPI equation.        Quant 2.2 - 3.2 % 5.9    Hemoglobin Bands   Hb A , Hb S , Hb F , Hb A2   Hemoglobin Electrophoresis Interp   See comment   Comments: There are prominent bands in the A and S positions,   measuring approximately 20 % and 66 % respectively with a hemoglobin   F band of approximately 8% and an elevated hemoglobin A2.     This pattern suggests sickle cell/beta + thalassemia, provided that   there is no history of recent RBC transfusion.  Await acid   electrophoresis   for confirmation of hemoglobin S.   Interpreted by Violette Sutherland M.D.          ASSESSMENT AND PLAN:   Encounter Diagnoses   Name  Primary?    Sickle-cell disease with pain Yes    Nausea     Iron deficiency anemia due to chronic blood loss     Hypertension, unspecified type        -patient has sickle beta thallassemia   Sickle Cell Disease Monitoring   1)Hydrea  -previously 1-3 crises a year; per patient with increasing severity and frequency over last 2-3 years   -initiated hydrea 500mg BID (5/22/17) but she quickly self discontinued due to mild rash and refused to restart. Restarted in January 2020  -discussed  l-glutamine today and patient would like to attempt; prescribed 15 g BID (8/20/18) but insurance refused to cover.  - IVF PRN in infusion center, last received yesterday  - Percocet PRN, refilled 3/60102  - patient reports nausea due to hydrea, script sent for Phenergan PRN    2)iron status   -history of iron deficiency from menorrhagnia, likely contributing to fatigue/anemia  -feraheme given 1/2020  -encourage pt to fu with ob/gyn    3)AVN  -has chronic bilateral hip pain; stable  -will discuss obtaining MRI if pain worsens    4)LE Ulcerations   NA    5)HTN  -continue norvasc 10; takes nightly  - continue hctz to 25mg daily (8/20/18)  ; takes nightly  - Coreg 12.5mg BID start today (5/21/19); increased to 25mg bid given htn; careful attn to asthma exacerbating  - BP wnl today      6)Opthalmic  -encouraged her to make appt with ophthalmology; states she will make appt    7)Pain  -continue home percocet 10 q 6 hrs     8)CardioPulmonary  -states had normal TTE jan 2019; next due jan 2021  -continue inhalers    9)Renal  -april 2019 UA with no proteinuria; next due April 2020    10)Neuro/CVA  -gives anecdotal history of subclinical stroke with no deficits  -will monitor    11)Vaccines   -receiving HIB series, menactra, prevnar followed by pneumovax    - needs next PNA vaccine 23 valent--received 13 valent in 2017    12)Contraception  -she has had tubal ligation     -encouraged her to set up pcp and gyne appts     Follow Up:  -cbc, cmp,  retic, ferritin, folate, type and screen  -patient states her insurance will be ending at the end of the month therefore will obtain labs, UA and vaccines at end of month       Maggie White NP  Hematology/BMT

## 2020-03-05 NOTE — Clinical Note
-please schedule cbc, cmp, retic, ferritin, folate, type and screen, UA and appt with  at the end of March with Pneumonia 23 valent vaccine at ID injection

## 2020-03-13 DIAGNOSIS — F43.20 ADJUSTMENT DISORDER, UNSPECIFIED TYPE: ICD-10-CM

## 2020-03-14 RX ORDER — FLUOXETINE HYDROCHLORIDE 40 MG/1
CAPSULE ORAL
Qty: 30 CAPSULE | Refills: 2 | OUTPATIENT
Start: 2020-03-14

## 2020-03-20 DIAGNOSIS — D57.40 SICKLE CELL BETA THALASSEMIA: ICD-10-CM

## 2020-03-20 RX ORDER — OXYCODONE AND ACETAMINOPHEN 10; 325 MG/1; MG/1
1 TABLET ORAL EVERY 4 HOURS PRN
Qty: 120 TABLET | Refills: 0 | Status: SHIPPED | OUTPATIENT
Start: 2020-03-20 | End: 2020-04-09 | Stop reason: SDUPTHER

## 2020-03-23 DIAGNOSIS — F43.20 ADJUSTMENT DISORDER, UNSPECIFIED TYPE: ICD-10-CM

## 2020-03-23 RX ORDER — FLUOXETINE HYDROCHLORIDE 40 MG/1
CAPSULE ORAL
Qty: 30 CAPSULE | Refills: 2 | OUTPATIENT
Start: 2020-03-23

## 2020-03-27 DIAGNOSIS — D57.00 SICKLE-CELL DISEASE WITH PAIN: ICD-10-CM

## 2020-03-27 RX ORDER — HYDROXYUREA 500 MG/1
500 CAPSULE ORAL 2 TIMES DAILY
Qty: 60 CAPSULE | Refills: 3
Start: 2020-03-27 | End: 2020-04-22

## 2020-03-29 DIAGNOSIS — I10 ESSENTIAL HYPERTENSION: ICD-10-CM

## 2020-03-30 DIAGNOSIS — D57.00 SICKLE-CELL DISEASE WITH PAIN: ICD-10-CM

## 2020-03-30 RX ORDER — FOLIC ACID 1 MG/1
TABLET ORAL
Qty: 30 TABLET | Refills: 1 | Status: ON HOLD | OUTPATIENT
Start: 2020-03-30 | End: 2020-06-08

## 2020-03-30 RX ORDER — AMLODIPINE BESYLATE 10 MG/1
TABLET ORAL
Qty: 30 TABLET | Refills: 2 | Status: SHIPPED | OUTPATIENT
Start: 2020-03-30 | End: 2020-07-23

## 2020-04-09 ENCOUNTER — PATIENT MESSAGE (OUTPATIENT)
Dept: OBSTETRICS AND GYNECOLOGY | Facility: CLINIC | Age: 40
End: 2020-04-09

## 2020-04-09 DIAGNOSIS — D57.40 SICKLE CELL BETA THALASSEMIA: ICD-10-CM

## 2020-04-09 RX ORDER — OXYCODONE AND ACETAMINOPHEN 10; 325 MG/1; MG/1
1 TABLET ORAL EVERY 4 HOURS PRN
Qty: 120 TABLET | Refills: 0 | Status: CANCELLED | OUTPATIENT
Start: 2020-04-09

## 2020-04-10 DIAGNOSIS — F43.20 ADJUSTMENT DISORDER, UNSPECIFIED TYPE: ICD-10-CM

## 2020-04-10 DIAGNOSIS — R11.0 NAUSEA: ICD-10-CM

## 2020-04-10 RX ORDER — FLUOXETINE HYDROCHLORIDE 40 MG/1
40 CAPSULE ORAL DAILY
Qty: 30 CAPSULE | Refills: 0 | Status: SHIPPED | OUTPATIENT
Start: 2020-04-10 | End: 2020-05-25 | Stop reason: SDUPTHER

## 2020-04-10 RX ORDER — PROMETHAZINE HYDROCHLORIDE 25 MG/1
25 TABLET ORAL EVERY 6 HOURS PRN
Qty: 30 TABLET | Refills: 2 | Status: CANCELLED | OUTPATIENT
Start: 2020-04-10

## 2020-04-10 NOTE — TELEPHONE ENCOUNTER
This is a resident clinic patient.  She has not been seen for over a year.  I refilled her meds about 7 months ago.  I did refill her Prozac but she will need an appointment, perhaps a virtual visit, with somebody in the resident clinic within the next month for any additional refills, thank you

## 2020-04-12 DIAGNOSIS — D57.40 SICKLE CELL BETA THALASSEMIA: ICD-10-CM

## 2020-04-12 RX ORDER — OXYCODONE AND ACETAMINOPHEN 10; 325 MG/1; MG/1
1 TABLET ORAL EVERY 4 HOURS PRN
Qty: 120 TABLET | Refills: 0 | Status: CANCELLED | OUTPATIENT
Start: 2020-04-09

## 2020-04-13 DIAGNOSIS — R11.0 NAUSEA: ICD-10-CM

## 2020-04-13 DIAGNOSIS — D57.40 SICKLE CELL BETA THALASSEMIA: ICD-10-CM

## 2020-04-13 RX ORDER — PROMETHAZINE HYDROCHLORIDE 25 MG/1
25 TABLET ORAL EVERY 6 HOURS PRN
Qty: 30 TABLET | Refills: 2 | Status: SHIPPED | OUTPATIENT
Start: 2020-04-13 | End: 2020-10-12 | Stop reason: SDUPTHER

## 2020-04-13 RX ORDER — METRONIDAZOLE 7.5 MG/G
1 GEL VAGINAL DAILY
Qty: 70 G | Refills: 0 | Status: SHIPPED | OUTPATIENT
Start: 2020-04-13 | End: 2020-04-18

## 2020-04-13 RX ORDER — OXYCODONE AND ACETAMINOPHEN 10; 325 MG/1; MG/1
1 TABLET ORAL EVERY 4 HOURS PRN
Qty: 120 TABLET | Refills: 0 | Status: SHIPPED | OUTPATIENT
Start: 2020-04-13 | End: 2020-05-04 | Stop reason: SDUPTHER

## 2020-04-14 DIAGNOSIS — G50.0 TRIGEMINAL NEURALGIA: ICD-10-CM

## 2020-04-14 RX ORDER — GABAPENTIN 800 MG/1
TABLET ORAL
Qty: 90 TABLET | Refills: 9 | OUTPATIENT
Start: 2020-04-14

## 2020-04-20 ENCOUNTER — TELEPHONE (OUTPATIENT)
Dept: HEMATOLOGY/ONCOLOGY | Facility: CLINIC | Age: 40
End: 2020-04-20

## 2020-04-20 NOTE — TELEPHONE ENCOUNTER
----- Message from Carrol Teresa sent at 4/20/2020 11:38 AM CDT -----  Contact: pt   Pt called and wants to reschedule an appt she has on today at 3pm     Pt would like a call from your office regarding that appt     Pt can be reached at [phone]

## 2020-04-20 NOTE — TELEPHONE ENCOUNTER
Rescheduled apts for 2 weeks from now per pt request. Pt states she is currently uninsured, working with financial assistance to obtain Medicaid. Advised pt to call clinic if issues should arise regarding coverage. Pt verbalized understanding. No other questions or concerns voiced.

## 2020-04-22 DIAGNOSIS — D57.00 SICKLE-CELL DISEASE WITH PAIN: ICD-10-CM

## 2020-04-22 RX ORDER — HYDROXYUREA 500 MG/1
CAPSULE ORAL
Qty: 60 CAPSULE | Refills: 5 | Status: SHIPPED | OUTPATIENT
Start: 2020-04-22 | End: 2020-08-04 | Stop reason: SDUPTHER

## 2020-04-28 ENCOUNTER — PATIENT MESSAGE (OUTPATIENT)
Dept: OBSTETRICS AND GYNECOLOGY | Facility: CLINIC | Age: 40
End: 2020-04-28

## 2020-05-02 DIAGNOSIS — G50.0 TRIGEMINAL NEURALGIA: ICD-10-CM

## 2020-05-02 RX ORDER — GABAPENTIN 800 MG/1
TABLET ORAL
Qty: 90 TABLET | Refills: 9 | OUTPATIENT
Start: 2020-05-02

## 2020-05-04 ENCOUNTER — TELEPHONE (OUTPATIENT)
Dept: HEMATOLOGY/ONCOLOGY | Facility: CLINIC | Age: 40
End: 2020-05-04

## 2020-05-04 DIAGNOSIS — D57.40 SICKLE CELL BETA THALASSEMIA: ICD-10-CM

## 2020-05-04 RX ORDER — OXYCODONE AND ACETAMINOPHEN 10; 325 MG/1; MG/1
1 TABLET ORAL EVERY 4 HOURS PRN
Qty: 120 TABLET | Refills: 0 | Status: SHIPPED | OUTPATIENT
Start: 2020-05-04 | End: 2020-05-25 | Stop reason: SDUPTHER

## 2020-05-04 NOTE — TELEPHONE ENCOUNTER
Informed patient that she is covered 100% by [facility]'s financial assistance program and can come to get lab work done

## 2020-05-04 NOTE — TELEPHONE ENCOUNTER
----- Message from Sugar Carreno sent at 5/4/2020 11:12 AM CDT -----  Contact: PT  PT called to see if she is required to get the blood work done, if it's needed on tomorrow - pt doesn't currently have insurance so she's not sure what to do    Callback: [phone]

## 2020-05-05 ENCOUNTER — LAB VISIT (OUTPATIENT)
Dept: LAB | Facility: HOSPITAL | Age: 40
End: 2020-05-05
Payer: MEDICAID

## 2020-05-05 DIAGNOSIS — D57.40 SICKLE CELL BETA THALASSEMIA: ICD-10-CM

## 2020-05-05 DIAGNOSIS — D57.1 HB-SS DISEASE WITHOUT CRISIS: ICD-10-CM

## 2020-05-05 LAB
ABO + RH BLD: NORMAL
ALBUMIN SERPL BCP-MCNC: 3.8 G/DL (ref 3.5–5.2)
ALP SERPL-CCNC: 78 U/L (ref 55–135)
ALT SERPL W/O P-5'-P-CCNC: 17 U/L (ref 10–44)
ANION GAP SERPL CALC-SCNC: 11 MMOL/L (ref 8–16)
AST SERPL-CCNC: 36 U/L (ref 10–40)
BASOPHILS # BLD AUTO: 0.04 K/UL (ref 0–0.2)
BASOPHILS NFR BLD: 0.7 % (ref 0–1.9)
BILIRUB SERPL-MCNC: 0.5 MG/DL (ref 0.1–1)
BLD GP AB SCN CELLS X3 SERPL QL: NORMAL
BUN SERPL-MCNC: 12 MG/DL (ref 6–20)
CALCIUM SERPL-MCNC: 9.5 MG/DL (ref 8.7–10.5)
CHLORIDE SERPL-SCNC: 104 MMOL/L (ref 95–110)
CO2 SERPL-SCNC: 23 MMOL/L (ref 23–29)
CREAT SERPL-MCNC: 0.8 MG/DL (ref 0.5–1.4)
DIFFERENTIAL METHOD: ABNORMAL
EOSINOPHIL # BLD AUTO: 0 K/UL (ref 0–0.5)
EOSINOPHIL NFR BLD: 0 % (ref 0–8)
ERYTHROCYTE [DISTWIDTH] IN BLOOD BY AUTOMATED COUNT: 15.7 % (ref 11.5–14.5)
EST. GFR  (AFRICAN AMERICAN): >60 ML/MIN/1.73 M^2
EST. GFR  (NON AFRICAN AMERICAN): >60 ML/MIN/1.73 M^2
FERRITIN SERPL-MCNC: 44 NG/ML (ref 20–300)
FOLATE SERPL-MCNC: 11.2 NG/ML (ref 4–24)
GLUCOSE SERPL-MCNC: 110 MG/DL (ref 70–110)
HCT VFR BLD AUTO: 38.1 % (ref 37–48.5)
HGB BLD-MCNC: 12.5 G/DL (ref 12–16)
IMM GRANULOCYTES # BLD AUTO: 0.01 K/UL (ref 0–0.04)
IMM GRANULOCYTES NFR BLD AUTO: 0.2 % (ref 0–0.5)
LYMPHOCYTES # BLD AUTO: 2.7 K/UL (ref 1–4.8)
LYMPHOCYTES NFR BLD: 46.2 % (ref 18–48)
MCH RBC QN AUTO: 27.7 PG (ref 27–31)
MCHC RBC AUTO-ENTMCNC: 32.8 G/DL (ref 32–36)
MCV RBC AUTO: 85 FL (ref 82–98)
MONOCYTES # BLD AUTO: 0.5 K/UL (ref 0.3–1)
MONOCYTES NFR BLD: 8.2 % (ref 4–15)
NEUTROPHILS # BLD AUTO: 2.6 K/UL (ref 1.8–7.7)
NEUTROPHILS NFR BLD: 44.7 % (ref 38–73)
NRBC BLD-RTO: 1 /100 WBC
PLATELET # BLD AUTO: 207 K/UL (ref 150–350)
PMV BLD AUTO: 9.4 FL (ref 9.2–12.9)
POTASSIUM SERPL-SCNC: 4.9 MMOL/L (ref 3.5–5.1)
PROT SERPL-MCNC: 8.4 G/DL (ref 6–8.4)
RBC # BLD AUTO: 4.51 M/UL (ref 4–5.4)
RETICS/RBC NFR AUTO: 3.6 % (ref 0.5–2.5)
SODIUM SERPL-SCNC: 138 MMOL/L (ref 136–145)
WBC # BLD AUTO: 5.84 K/UL (ref 3.9–12.7)

## 2020-05-05 PROCEDURE — 85025 COMPLETE CBC W/AUTO DIFF WBC: CPT

## 2020-05-05 PROCEDURE — 85045 AUTOMATED RETICULOCYTE COUNT: CPT

## 2020-05-05 PROCEDURE — 82746 ASSAY OF FOLIC ACID SERUM: CPT

## 2020-05-05 PROCEDURE — 82728 ASSAY OF FERRITIN: CPT

## 2020-05-05 PROCEDURE — 80053 COMPREHEN METABOLIC PANEL: CPT

## 2020-05-05 PROCEDURE — 86850 RBC ANTIBODY SCREEN: CPT

## 2020-05-06 ENCOUNTER — OFFICE VISIT (OUTPATIENT)
Dept: HEMATOLOGY/ONCOLOGY | Facility: CLINIC | Age: 40
End: 2020-05-06
Payer: MEDICAID

## 2020-05-06 DIAGNOSIS — D57.1 HB-SS DISEASE WITHOUT CRISIS: Primary | ICD-10-CM

## 2020-05-06 DIAGNOSIS — D50.0 IRON DEFICIENCY ANEMIA DUE TO CHRONIC BLOOD LOSS: ICD-10-CM

## 2020-05-06 PROCEDURE — 99214 OFFICE O/P EST MOD 30 MIN: CPT | Mod: 95,,, | Performed by: INTERNAL MEDICINE

## 2020-05-06 PROCEDURE — 99214 PR OFFICE/OUTPT VISIT, EST, LEVL IV, 30-39 MIN: ICD-10-PCS | Mod: 95,,, | Performed by: INTERNAL MEDICINE

## 2020-05-08 ENCOUNTER — TELEPHONE (OUTPATIENT)
Dept: HEMATOLOGY/ONCOLOGY | Facility: CLINIC | Age: 40
End: 2020-05-08

## 2020-05-08 NOTE — TELEPHONE ENCOUNTER
----- Message from Mushtaq Richardson sent at 5/8/2020  4:30 PM CDT -----  Contact: Pt  Pt called and would like the nurse to call her back      This is regarding the pt medication    Pt can be reached at [phone]

## 2020-05-08 NOTE — TELEPHONE ENCOUNTER
Pt requesting that Dr. Montgomery refill a month supply of gabapentin until pt can get an appointment with neurologist. Pt states she has a few left and it is not urgent. Will discuss further with Dr. Montgomery on Monday 5/11/2020 and reach out to pt then.

## 2020-05-11 DIAGNOSIS — J45.20 INTERMITTENT ASTHMA, UNSPECIFIED ASTHMA SEVERITY, UNSPECIFIED WHETHER COMPLICATED: ICD-10-CM

## 2020-05-11 DIAGNOSIS — G50.0 TRIGEMINAL NEURALGIA: Primary | ICD-10-CM

## 2020-05-11 DIAGNOSIS — D57.1 HB-SS DISEASE WITHOUT CRISIS: ICD-10-CM

## 2020-05-11 RX ORDER — GABAPENTIN 400 MG/1
800 CAPSULE ORAL 3 TIMES DAILY
Qty: 180 CAPSULE | Refills: 0 | Status: SHIPPED | OUTPATIENT
Start: 2020-05-11 | End: 2020-06-04

## 2020-05-11 RX ORDER — FLUTICASONE PROPIONATE 50 MCG
1 SPRAY, SUSPENSION (ML) NASAL DAILY
Qty: 15.8 ML | Refills: 0 | Status: SHIPPED | OUTPATIENT
Start: 2020-05-11 | End: 2020-06-04

## 2020-05-11 NOTE — TELEPHONE ENCOUNTER
Informed pt that Dr. Montgomery was able to send in prescription for 1 month supply of gabapentin to pharmacy. Pt was appreciative. No other questions or concerns voiced.

## 2020-05-20 DIAGNOSIS — D50.0 IRON DEFICIENCY ANEMIA DUE TO CHRONIC BLOOD LOSS: Primary | ICD-10-CM

## 2020-05-20 DIAGNOSIS — D57.00 SICKLE-CELL DISEASE WITH PAIN: ICD-10-CM

## 2020-05-25 DIAGNOSIS — F43.20 ADJUSTMENT DISORDER, UNSPECIFIED TYPE: ICD-10-CM

## 2020-05-25 DIAGNOSIS — D57.40 SICKLE CELL BETA THALASSEMIA: ICD-10-CM

## 2020-05-25 RX ORDER — OXYCODONE AND ACETAMINOPHEN 10; 325 MG/1; MG/1
1 TABLET ORAL EVERY 4 HOURS PRN
Qty: 120 TABLET | Refills: 0 | Status: CANCELLED | OUTPATIENT
Start: 2020-05-25

## 2020-05-25 RX ORDER — FLUOXETINE HYDROCHLORIDE 40 MG/1
40 CAPSULE ORAL DAILY
Qty: 30 CAPSULE | Refills: 2 | Status: SHIPPED | OUTPATIENT
Start: 2020-05-25 | End: 2020-09-01 | Stop reason: SDUPTHER

## 2020-05-25 NOTE — TELEPHONE ENCOUNTER
"----- Message from Xi Aparicio sent at 5/25/2020  2:45 PM CDT -----  Patient Assist    Name of caller:  Nazanin   Provider name: Pee Montgomery MD  Contact Preference:  [phone]  Is this regarding current patient or new patient?: current  What is the nature of the call?    - pt called to get refill for listed Rx:  oxyCODONE-acetaminophen (PERCOCET)  mg per tablet  Pharmacy: [facility]     Additional Notes:   "Thank you for all that you do for our patients'"     "

## 2020-05-26 ENCOUNTER — TELEPHONE (OUTPATIENT)
Dept: HEMATOLOGY/ONCOLOGY | Facility: CLINIC | Age: 40
End: 2020-05-26

## 2020-05-26 DIAGNOSIS — D57.40 SICKLE CELL BETA THALASSEMIA: ICD-10-CM

## 2020-05-26 RX ORDER — OXYCODONE AND ACETAMINOPHEN 10; 325 MG/1; MG/1
1 TABLET ORAL EVERY 4 HOURS PRN
Qty: 120 TABLET | Refills: 0 | Status: SHIPPED | OUTPATIENT
Start: 2020-05-26 | End: 2020-06-15 | Stop reason: SDUPTHER

## 2020-05-26 NOTE — TELEPHONE ENCOUNTER
"----- Message from Xi Aparicio sent at 5/26/2020  1:23 PM CDT -----  Patient Assist    Name of caller: Nazanin   Provider name: Ulises MEEKS MD  Contact Preference:  [phone]   Is this regarding current patient or new patient?: current   What is the nature of the call?    - refill for listed script  oxyCODONE-acetaminophen (PERCOCET)  mg per tablet  Pharmacy:  [facility] [place]     Additional Notes:   "Thank you for all that you do for our patients'"     "

## 2020-05-26 NOTE — TELEPHONE ENCOUNTER
Informed pt that pain medication was sent to [facility]. Pt verbalized understanding, was appreciative of call. No other questions or concerns stated at this time.

## 2020-05-28 ENCOUNTER — TELEPHONE (OUTPATIENT)
Dept: HEMATOLOGY/ONCOLOGY | Facility: CLINIC | Age: 40
End: 2020-05-28

## 2020-05-28 NOTE — TELEPHONE ENCOUNTER
----- Message from Jarett Euceda sent at 5/28/2020  3:13 PM CDT -----  Contact: Patient  Who is calling:: Pt    Provider treating:: Ulises    Current symptom:: Nasal congestion, green mucus, and headache     Are you currently receiving treatment for your diagnosis:: No    When was your last treatment:: --    How long have you had the symptom:: 4 day    Do you feel you need to be seen today:: No    Communication preference:: [phone]    Additional Info:: Thinks it may be a sinus infection.

## 2020-05-28 NOTE — TELEPHONE ENCOUNTER
Advised pt to go to urgent care or PCP to be further evaluated for respiratory infection. Pt verbalized udnerstanding, agreeable to going to urgent care. Will call to update us.

## 2020-06-08 ENCOUNTER — HOSPITAL ENCOUNTER (INPATIENT)
Facility: HOSPITAL | Age: 40
LOS: 3 days | Discharge: HOME | DRG: 175 | End: 2020-06-11
Attending: EMERGENCY MEDICINE | Admitting: HOSPITALIST
Payer: MEDICAID

## 2020-06-08 DIAGNOSIS — D57.01 ACUTE CHEST SYNDROME: ICD-10-CM

## 2020-06-08 DIAGNOSIS — J96.01 ACUTE HYPOXEMIC RESPIRATORY FAILURE: ICD-10-CM

## 2020-06-08 DIAGNOSIS — I26.99 PULMONARY EMBOLISM, UNSPECIFIED CHRONICITY, UNSPECIFIED PULMONARY EMBOLISM TYPE, UNSPECIFIED WHETHER ACUTE COR PULMONALE PRESENT: ICD-10-CM

## 2020-06-08 DIAGNOSIS — I26.99 PULMONARY EMBOLISM: ICD-10-CM

## 2020-06-08 DIAGNOSIS — R06.02 SHORTNESS OF BREATH: ICD-10-CM

## 2020-06-08 DIAGNOSIS — R09.02 HYPOXIA: Primary | ICD-10-CM

## 2020-06-08 PROBLEM — E87.6 HYPOKALEMIA: Status: ACTIVE | Noted: 2020-06-08

## 2020-06-08 PROBLEM — E66.01 MORBID OBESITY: Status: ACTIVE | Noted: 2020-06-08

## 2020-06-08 LAB
ALBUMIN SERPL BCP-MCNC: 3.7 G/DL (ref 3.5–5.2)
ALP SERPL-CCNC: 75 U/L (ref 55–135)
ALT SERPL W/O P-5'-P-CCNC: 9 U/L (ref 10–44)
ANION GAP SERPL CALC-SCNC: 11 MMOL/L (ref 8–16)
ANISOCYTOSIS BLD QL SMEAR: ABNORMAL
APTT BLDCRRT: 23 SEC (ref 21–32)
APTT BLDCRRT: >150 SEC (ref 21–32)
AST SERPL-CCNC: 21 U/L (ref 10–40)
BASO STIPL BLD QL SMEAR: ABNORMAL
BASOPHILS # BLD AUTO: 0.04 K/UL (ref 0–0.2)
BASOPHILS NFR BLD: 0.4 % (ref 0–1.9)
BILIRUB SERPL-MCNC: 0.8 MG/DL (ref 0.1–1)
BNP SERPL-MCNC: 21 PG/ML (ref 0–99)
BUN SERPL-MCNC: 8 MG/DL (ref 6–20)
CALCIUM SERPL-MCNC: 9.1 MG/DL (ref 8.7–10.5)
CHLORIDE SERPL-SCNC: 98 MMOL/L (ref 95–110)
CO2 SERPL-SCNC: 27 MMOL/L (ref 23–29)
CREAT SERPL-MCNC: 1 MG/DL (ref 0.5–1.4)
DIFFERENTIAL METHOD: ABNORMAL
EOSINOPHIL # BLD AUTO: 0.3 K/UL (ref 0–0.5)
EOSINOPHIL NFR BLD: 2.6 % (ref 0–8)
ERYTHROCYTE [DISTWIDTH] IN BLOOD BY AUTOMATED COUNT: 18.9 % (ref 11.5–14.5)
EST. GFR  (AFRICAN AMERICAN): >60 ML/MIN/1.73 M^2
EST. GFR  (NON AFRICAN AMERICAN): >60 ML/MIN/1.73 M^2
GIANT PLATELETS BLD QL SMEAR: PRESENT
GLUCOSE SERPL-MCNC: 115 MG/DL (ref 70–110)
HCT VFR BLD AUTO: 35.2 % (ref 37–48.5)
HGB BLD-MCNC: 12.1 G/DL (ref 12–16)
HOWELL-JOLLY BOD BLD QL SMEAR: ABNORMAL
HYPOCHROMIA BLD QL SMEAR: ABNORMAL
IMM GRANULOCYTES # BLD AUTO: 0.06 K/UL (ref 0–0.04)
IMM GRANULOCYTES NFR BLD AUTO: 0.6 % (ref 0–0.5)
INR PPP: 1 (ref 0.8–1.2)
LYMPHOCYTES # BLD AUTO: 2.1 K/UL (ref 1–4.8)
LYMPHOCYTES NFR BLD: 20 % (ref 18–48)
MCH RBC QN AUTO: 30.9 PG (ref 27–31)
MCHC RBC AUTO-ENTMCNC: 34.4 G/DL (ref 32–36)
MCV RBC AUTO: 90 FL (ref 82–98)
MONOCYTES # BLD AUTO: 1.1 K/UL (ref 0.3–1)
MONOCYTES NFR BLD: 10.6 % (ref 4–15)
NEUTROPHILS # BLD AUTO: 6.8 K/UL (ref 1.8–7.7)
NEUTROPHILS NFR BLD: 65.8 % (ref 38–73)
NRBC BLD-RTO: 51 /100 WBC
OVALOCYTES BLD QL SMEAR: ABNORMAL
PLATELET # BLD AUTO: 150 K/UL (ref 150–350)
PLATELET BLD QL SMEAR: ABNORMAL
PMV BLD AUTO: 10.8 FL (ref 9.2–12.9)
POIKILOCYTOSIS BLD QL SMEAR: ABNORMAL
POLYCHROMASIA BLD QL SMEAR: ABNORMAL
POTASSIUM SERPL-SCNC: 3.3 MMOL/L (ref 3.5–5.1)
PROT SERPL-MCNC: 8.2 G/DL (ref 6–8.4)
PROTHROMBIN TIME: 10.5 SEC (ref 9–12.5)
RBC # BLD AUTO: 3.92 M/UL (ref 4–5.4)
RETICS/RBC NFR AUTO: 5.7 % (ref 0.5–2.5)
SARS-COV-2 RDRP RESP QL NAA+PROBE: NEGATIVE
SODIUM SERPL-SCNC: 136 MMOL/L (ref 136–145)
SPHEROCYTES BLD QL SMEAR: ABNORMAL
TARGETS BLD QL SMEAR: ABNORMAL
TROPONIN I SERPL DL<=0.01 NG/ML-MCNC: 0.01 NG/ML (ref 0–0.03)
WBC # BLD AUTO: 10.27 K/UL (ref 3.9–12.7)

## 2020-06-08 PROCEDURE — 85730 THROMBOPLASTIN TIME PARTIAL: CPT | Mod: 91

## 2020-06-08 PROCEDURE — 85610 PROTHROMBIN TIME: CPT

## 2020-06-08 PROCEDURE — 94761 N-INVAS EAR/PLS OXIMETRY MLT: CPT

## 2020-06-08 PROCEDURE — 76937 US GUIDE VASCULAR ACCESS: CPT

## 2020-06-08 PROCEDURE — 96376 TX/PRO/DX INJ SAME DRUG ADON: CPT

## 2020-06-08 PROCEDURE — 36415 COLL VENOUS BLD VENIPUNCTURE: CPT

## 2020-06-08 PROCEDURE — 25000242 PHARM REV CODE 250 ALT 637 W/ HCPCS: Performed by: HOSPITALIST

## 2020-06-08 PROCEDURE — 63600175 PHARM REV CODE 636 W HCPCS: Performed by: EMERGENCY MEDICINE

## 2020-06-08 PROCEDURE — S5010 5% DEXTROSE AND 0.45% SALINE: HCPCS | Performed by: STUDENT IN AN ORGANIZED HEALTH CARE EDUCATION/TRAINING PROGRAM

## 2020-06-08 PROCEDURE — 85045 AUTOMATED RETICULOCYTE COUNT: CPT

## 2020-06-08 PROCEDURE — 93010 EKG 12-LEAD: ICD-10-PCS | Mod: ,,, | Performed by: INTERNAL MEDICINE

## 2020-06-08 PROCEDURE — 99285 EMERGENCY DEPT VISIT HI MDM: CPT | Mod: 25

## 2020-06-08 PROCEDURE — 93005 ELECTROCARDIOGRAM TRACING: CPT

## 2020-06-08 PROCEDURE — 80053 COMPREHEN METABOLIC PANEL: CPT

## 2020-06-08 PROCEDURE — 93010 ELECTROCARDIOGRAM REPORT: CPT | Mod: ,,, | Performed by: INTERNAL MEDICINE

## 2020-06-08 PROCEDURE — 25000003 PHARM REV CODE 250: Performed by: STUDENT IN AN ORGANIZED HEALTH CARE EDUCATION/TRAINING PROGRAM

## 2020-06-08 PROCEDURE — 27100098 HC SPACER

## 2020-06-08 PROCEDURE — 94640 AIRWAY INHALATION TREATMENT: CPT

## 2020-06-08 PROCEDURE — 96361 HYDRATE IV INFUSION ADD-ON: CPT

## 2020-06-08 PROCEDURE — U0002 COVID-19 LAB TEST NON-CDC: HCPCS

## 2020-06-08 PROCEDURE — 25500020 PHARM REV CODE 255: Performed by: EMERGENCY MEDICINE

## 2020-06-08 PROCEDURE — 99223 PR INITIAL HOSPITAL CARE,LEVL III: ICD-10-PCS | Mod: ,,, | Performed by: HOSPITALIST

## 2020-06-08 PROCEDURE — C1751 CATH, INF, PER/CENT/MIDLINE: HCPCS

## 2020-06-08 PROCEDURE — 63600175 PHARM REV CODE 636 W HCPCS: Performed by: STUDENT IN AN ORGANIZED HEALTH CARE EDUCATION/TRAINING PROGRAM

## 2020-06-08 PROCEDURE — 25000003 PHARM REV CODE 250: Performed by: EMERGENCY MEDICINE

## 2020-06-08 PROCEDURE — 99285 EMERGENCY DEPT VISIT HI MDM: CPT | Mod: ,,, | Performed by: EMERGENCY MEDICINE

## 2020-06-08 PROCEDURE — 85730 THROMBOPLASTIN TIME PARTIAL: CPT

## 2020-06-08 PROCEDURE — 99900035 HC TECH TIME PER 15 MIN (STAT)

## 2020-06-08 PROCEDURE — 99285 PR EMERGENCY DEPT VISIT,LEVEL V: ICD-10-PCS | Mod: ,,, | Performed by: EMERGENCY MEDICINE

## 2020-06-08 PROCEDURE — 94799 UNLISTED PULMONARY SVC/PX: CPT

## 2020-06-08 PROCEDURE — 84484 ASSAY OF TROPONIN QUANT: CPT

## 2020-06-08 PROCEDURE — 11000001 HC ACUTE MED/SURG PRIVATE ROOM

## 2020-06-08 PROCEDURE — 96374 THER/PROPH/DIAG INJ IV PUSH: CPT

## 2020-06-08 PROCEDURE — 99223 1ST HOSP IP/OBS HIGH 75: CPT | Mod: ,,, | Performed by: HOSPITALIST

## 2020-06-08 PROCEDURE — 83880 ASSAY OF NATRIURETIC PEPTIDE: CPT

## 2020-06-08 PROCEDURE — 25000242 PHARM REV CODE 250 ALT 637 W/ HCPCS: Performed by: EMERGENCY MEDICINE

## 2020-06-08 PROCEDURE — 85025 COMPLETE CBC W/AUTO DIFF WBC: CPT

## 2020-06-08 PROCEDURE — 36410 VNPNXR 3YR/> PHY/QHP DX/THER: CPT

## 2020-06-08 PROCEDURE — 25000242 PHARM REV CODE 250 ALT 637 W/ HCPCS: Performed by: STUDENT IN AN ORGANIZED HEALTH CARE EDUCATION/TRAINING PROGRAM

## 2020-06-08 PROCEDURE — 27000221 HC OXYGEN, UP TO 24 HOURS

## 2020-06-08 RX ORDER — OXYCODONE AND ACETAMINOPHEN 10; 325 MG/1; MG/1
1 TABLET ORAL EVERY 4 HOURS PRN
Status: DISCONTINUED | OUTPATIENT
Start: 2020-06-08 | End: 2020-06-11 | Stop reason: HOSPADM

## 2020-06-08 RX ORDER — HYDROCHLOROTHIAZIDE 25 MG/1
25 TABLET ORAL DAILY
Status: DISCONTINUED | OUTPATIENT
Start: 2020-06-08 | End: 2020-06-09

## 2020-06-08 RX ORDER — FLUTICASONE PROPIONATE AND SALMETEROL 100; 50 UG/1; UG/1
1 POWDER RESPIRATORY (INHALATION) DAILY
Status: ON HOLD | COMMUNITY
End: 2020-12-22

## 2020-06-08 RX ORDER — GABAPENTIN 400 MG/1
800 CAPSULE ORAL 3 TIMES DAILY
Status: DISCONTINUED | OUTPATIENT
Start: 2020-06-08 | End: 2020-06-11 | Stop reason: HOSPADM

## 2020-06-08 RX ORDER — HYDROMORPHONE HYDROCHLORIDE 1 MG/ML
1 INJECTION, SOLUTION INTRAMUSCULAR; INTRAVENOUS; SUBCUTANEOUS EVERY 6 HOURS PRN
Status: DISCONTINUED | OUTPATIENT
Start: 2020-06-08 | End: 2020-06-08

## 2020-06-08 RX ORDER — HYDROXYZINE HYDROCHLORIDE 25 MG/1
25 TABLET, FILM COATED ORAL 3 TIMES DAILY PRN
Status: DISCONTINUED | OUTPATIENT
Start: 2020-06-08 | End: 2020-06-11 | Stop reason: HOSPADM

## 2020-06-08 RX ORDER — HEPARIN SODIUM,PORCINE/D5W 25000/250
18 INTRAVENOUS SOLUTION INTRAVENOUS CONTINUOUS
Status: DISCONTINUED | OUTPATIENT
Start: 2020-06-08 | End: 2020-06-09

## 2020-06-08 RX ORDER — AMLODIPINE BESYLATE 10 MG/1
10 TABLET ORAL DAILY
Status: DISCONTINUED | OUTPATIENT
Start: 2020-06-08 | End: 2020-06-11 | Stop reason: HOSPADM

## 2020-06-08 RX ORDER — DIPHENHYDRAMINE HCL 25 MG
25 CAPSULE ORAL
Status: COMPLETED | OUTPATIENT
Start: 2020-06-08 | End: 2020-06-08

## 2020-06-08 RX ORDER — HYDROMORPHONE HYDROCHLORIDE 1 MG/ML
2 INJECTION, SOLUTION INTRAMUSCULAR; INTRAVENOUS; SUBCUTANEOUS EVERY 6 HOURS PRN
Status: DISCONTINUED | OUTPATIENT
Start: 2020-06-08 | End: 2020-06-10

## 2020-06-08 RX ORDER — IPRATROPIUM BROMIDE AND ALBUTEROL SULFATE 2.5; .5 MG/3ML; MG/3ML
3 SOLUTION RESPIRATORY (INHALATION)
Status: DISCONTINUED | OUTPATIENT
Start: 2020-06-08 | End: 2020-06-11 | Stop reason: HOSPADM

## 2020-06-08 RX ORDER — HYDROMORPHONE HYDROCHLORIDE 1 MG/ML
1 INJECTION, SOLUTION INTRAMUSCULAR; INTRAVENOUS; SUBCUTANEOUS
Status: COMPLETED | OUTPATIENT
Start: 2020-06-08 | End: 2020-06-08

## 2020-06-08 RX ORDER — FOLIC ACID 1 MG/1
1000 TABLET ORAL DAILY
Status: DISCONTINUED | OUTPATIENT
Start: 2020-06-08 | End: 2020-06-11 | Stop reason: HOSPADM

## 2020-06-08 RX ORDER — IPRATROPIUM BROMIDE AND ALBUTEROL SULFATE 2.5; .5 MG/3ML; MG/3ML
3 SOLUTION RESPIRATORY (INHALATION)
Status: DISCONTINUED | OUTPATIENT
Start: 2020-06-08 | End: 2020-06-08

## 2020-06-08 RX ORDER — ALBUTEROL SULFATE 90 UG/1
2 AEROSOL, METERED RESPIRATORY (INHALATION)
Status: DISCONTINUED | OUTPATIENT
Start: 2020-06-08 | End: 2020-06-08

## 2020-06-08 RX ORDER — CYCLOBENZAPRINE HCL 10 MG
10 TABLET ORAL NIGHTLY PRN
Status: DISCONTINUED | OUTPATIENT
Start: 2020-06-08 | End: 2020-06-11 | Stop reason: HOSPADM

## 2020-06-08 RX ORDER — NALOXONE HCL 0.4 MG/ML
0.02 VIAL (ML) INJECTION
Status: CANCELLED | OUTPATIENT
Start: 2020-06-08

## 2020-06-08 RX ORDER — PROMETHAZINE HYDROCHLORIDE 25 MG/1
25 TABLET ORAL EVERY 6 HOURS PRN
Status: DISCONTINUED | OUTPATIENT
Start: 2020-06-08 | End: 2020-06-11 | Stop reason: HOSPADM

## 2020-06-08 RX ORDER — CARVEDILOL 25 MG/1
25 TABLET ORAL 2 TIMES DAILY
Status: DISCONTINUED | OUTPATIENT
Start: 2020-06-08 | End: 2020-06-11 | Stop reason: HOSPADM

## 2020-06-08 RX ORDER — DEXTROSE MONOHYDRATE AND SODIUM CHLORIDE 5; .45 G/100ML; G/100ML
INJECTION, SOLUTION INTRAVENOUS CONTINUOUS
Status: DISCONTINUED | OUTPATIENT
Start: 2020-06-08 | End: 2020-06-09

## 2020-06-08 RX ORDER — IPRATROPIUM BROMIDE AND ALBUTEROL SULFATE 2.5; .5 MG/3ML; MG/3ML
3 SOLUTION RESPIRATORY (INHALATION) 3 TIMES DAILY
Status: DISCONTINUED | OUTPATIENT
Start: 2020-06-08 | End: 2020-06-08

## 2020-06-08 RX ORDER — POTASSIUM CHLORIDE 20 MEQ/1
40 TABLET, EXTENDED RELEASE ORAL ONCE
Status: COMPLETED | OUTPATIENT
Start: 2020-06-08 | End: 2020-06-08

## 2020-06-08 RX ORDER — DEXTROSE MONOHYDRATE AND SODIUM CHLORIDE 5; .45 G/100ML; G/100ML
INJECTION, SOLUTION INTRAVENOUS CONTINUOUS
Status: DISCONTINUED | OUTPATIENT
Start: 2020-06-08 | End: 2020-06-08

## 2020-06-08 RX ORDER — FLUOXETINE HYDROCHLORIDE 20 MG/1
40 CAPSULE ORAL DAILY
Status: DISCONTINUED | OUTPATIENT
Start: 2020-06-08 | End: 2020-06-11 | Stop reason: HOSPADM

## 2020-06-08 RX ORDER — PREDNISONE 20 MG/1
60 TABLET ORAL
Status: COMPLETED | OUTPATIENT
Start: 2020-06-08 | End: 2020-06-08

## 2020-06-08 RX ORDER — FLUTICASONE FUROATE AND VILANTEROL 100; 25 UG/1; UG/1
1 POWDER RESPIRATORY (INHALATION) DAILY
Status: DISCONTINUED | OUTPATIENT
Start: 2020-06-08 | End: 2020-06-11 | Stop reason: HOSPADM

## 2020-06-08 RX ORDER — FLUTICASONE PROPIONATE 50 MCG
1 SPRAY, SUSPENSION (ML) NASAL DAILY
Status: DISCONTINUED | OUTPATIENT
Start: 2020-06-08 | End: 2020-06-11 | Stop reason: HOSPADM

## 2020-06-08 RX ORDER — HYDROXYUREA 500 MG/1
500 CAPSULE ORAL 2 TIMES DAILY
Status: DISCONTINUED | OUTPATIENT
Start: 2020-06-08 | End: 2020-06-11 | Stop reason: HOSPADM

## 2020-06-08 RX ORDER — SODIUM CHLORIDE 0.9 % (FLUSH) 0.9 %
10 SYRINGE (ML) INJECTION
Status: DISCONTINUED | OUTPATIENT
Start: 2020-06-08 | End: 2020-06-11 | Stop reason: HOSPADM

## 2020-06-08 RX ORDER — AMOXICILLIN 250 MG
1 CAPSULE ORAL 2 TIMES DAILY
Status: DISCONTINUED | OUTPATIENT
Start: 2020-06-08 | End: 2020-06-11 | Stop reason: HOSPADM

## 2020-06-08 RX ORDER — CARBAMAZEPINE 200 MG/1
400 TABLET ORAL 2 TIMES DAILY
Status: DISCONTINUED | OUTPATIENT
Start: 2020-06-08 | End: 2020-06-11 | Stop reason: HOSPADM

## 2020-06-08 RX ORDER — SODIUM CHLORIDE, SODIUM LACTATE, POTASSIUM CHLORIDE, CALCIUM CHLORIDE 600; 310; 30; 20 MG/100ML; MG/100ML; MG/100ML; MG/100ML
INJECTION, SOLUTION INTRAVENOUS CONTINUOUS
Status: DISCONTINUED | OUTPATIENT
Start: 2020-06-08 | End: 2020-06-08

## 2020-06-08 RX ORDER — FOLIC ACID 0.4 MG
400 TABLET ORAL DAILY
Status: ON HOLD | COMMUNITY
End: 2020-12-26 | Stop reason: HOSPADM

## 2020-06-08 RX ADMIN — IPRATROPIUM BROMIDE AND ALBUTEROL SULFATE 3 ML: .5; 3 SOLUTION RESPIRATORY (INHALATION) at 09:06

## 2020-06-08 RX ADMIN — DIPHENHYDRAMINE HYDROCHLORIDE 25 MG: 25 CAPSULE ORAL at 03:06

## 2020-06-08 RX ADMIN — HYDROXYUREA 500 MG: 500 CAPSULE ORAL at 11:06

## 2020-06-08 RX ADMIN — HYDROXYUREA 500 MG: 500 CAPSULE ORAL at 10:06

## 2020-06-08 RX ADMIN — CARBAMAZEPINE 400 MG: 200 TABLET ORAL at 08:06

## 2020-06-08 RX ADMIN — HYDROMORPHONE HYDROCHLORIDE 2 MG: 1 INJECTION, SOLUTION INTRAMUSCULAR; INTRAVENOUS; SUBCUTANEOUS at 02:06

## 2020-06-08 RX ADMIN — CARVEDILOL 25 MG: 25 TABLET, FILM COATED ORAL at 09:06

## 2020-06-08 RX ADMIN — FLUOXETINE 40 MG: 20 CAPSULE ORAL at 09:06

## 2020-06-08 RX ADMIN — SODIUM CHLORIDE 500 ML: 0.9 INJECTION, SOLUTION INTRAVENOUS at 03:06

## 2020-06-08 RX ADMIN — ALBUTEROL SULFATE 2 PUFF: 90 AEROSOL, METERED RESPIRATORY (INHALATION) at 03:06

## 2020-06-08 RX ADMIN — POTASSIUM CHLORIDE 40 MEQ: 1500 TABLET, EXTENDED RELEASE ORAL at 07:06

## 2020-06-08 RX ADMIN — HEPARIN SODIUM AND DEXTROSE 18 UNITS/KG/HR: 10000; 5 INJECTION INTRAVENOUS at 01:06

## 2020-06-08 RX ADMIN — CARVEDILOL 25 MG: 25 TABLET, FILM COATED ORAL at 08:06

## 2020-06-08 RX ADMIN — DEXTROSE MONOHYDRATE AND SODIUM CHLORIDE: 5; .45 INJECTION, SOLUTION INTRAVENOUS at 08:06

## 2020-06-08 RX ADMIN — FLUTICASONE PROPIONATE 50 MCG: 50 SPRAY, METERED NASAL at 11:06

## 2020-06-08 RX ADMIN — HYDROMORPHONE HYDROCHLORIDE 1 MG: 1 INJECTION, SOLUTION INTRAMUSCULAR; INTRAVENOUS; SUBCUTANEOUS at 03:06

## 2020-06-08 RX ADMIN — HYDROMORPHONE HYDROCHLORIDE 1 MG: 1 INJECTION, SOLUTION INTRAMUSCULAR; INTRAVENOUS; SUBCUTANEOUS at 05:06

## 2020-06-08 RX ADMIN — HYDROMORPHONE HYDROCHLORIDE 1 MG: 1 INJECTION, SOLUTION INTRAMUSCULAR; INTRAVENOUS; SUBCUTANEOUS at 07:06

## 2020-06-08 RX ADMIN — HEPARIN SODIUM AND DEXTROSE 18 UNITS/KG/HR: 10000; 5 INJECTION INTRAVENOUS at 08:06

## 2020-06-08 RX ADMIN — SENNOSIDES AND DOCUSATE SODIUM 1 TABLET: 8.6; 5 TABLET ORAL at 09:06

## 2020-06-08 RX ADMIN — HYDROMORPHONE HYDROCHLORIDE 2 MG: 1 INJECTION, SOLUTION INTRAMUSCULAR; INTRAVENOUS; SUBCUTANEOUS at 08:06

## 2020-06-08 RX ADMIN — IPRATROPIUM BROMIDE AND ALBUTEROL SULFATE 3 ML: .5; 3 SOLUTION RESPIRATORY (INHALATION) at 01:06

## 2020-06-08 RX ADMIN — HEPARIN SODIUM AND DEXTROSE 14 UNITS/KG/HR: 10000; 5 INJECTION INTRAVENOUS at 07:06

## 2020-06-08 RX ADMIN — GABAPENTIN 800 MG: 400 CAPSULE ORAL at 08:06

## 2020-06-08 RX ADMIN — IOHEXOL 100 ML: 350 INJECTION, SOLUTION INTRAVENOUS at 04:06

## 2020-06-08 RX ADMIN — HYDROXYZINE HYDROCHLORIDE 25 MG: 25 TABLET, FILM COATED ORAL at 08:06

## 2020-06-08 RX ADMIN — GABAPENTIN 800 MG: 400 CAPSULE ORAL at 09:06

## 2020-06-08 RX ADMIN — PREDNISONE 60 MG: 20 TABLET ORAL at 03:06

## 2020-06-08 RX ADMIN — GABAPENTIN 800 MG: 400 CAPSULE ORAL at 02:06

## 2020-06-08 RX ADMIN — SENNOSIDES AND DOCUSATE SODIUM 1 TABLET: 8.6; 5 TABLET ORAL at 08:06

## 2020-06-08 RX ADMIN — FLUTICASONE FUROATE AND VILANTEROL TRIFENATATE 1 PUFF: 100; 25 POWDER RESPIRATORY (INHALATION) at 08:06

## 2020-06-08 RX ADMIN — CARBAMAZEPINE 400 MG: 200 TABLET ORAL at 09:06

## 2020-06-08 RX ADMIN — IPRATROPIUM BROMIDE AND ALBUTEROL SULFATE 3 ML: .5; 3 SOLUTION RESPIRATORY (INHALATION) at 08:06

## 2020-06-08 RX ADMIN — FOLIC ACID 1000 MCG: 1 TABLET ORAL at 09:06

## 2020-06-08 RX ADMIN — OXYCODONE AND ACETAMINOPHEN 1 TABLET: 10; 325 TABLET ORAL at 11:06

## 2020-06-08 RX ADMIN — HYDROCHLOROTHIAZIDE 25 MG: 25 TABLET ORAL at 09:06

## 2020-06-08 RX ADMIN — DEXTROSE MONOHYDRATE AND SODIUM CHLORIDE: 5; .45 INJECTION, SOLUTION INTRAVENOUS at 07:06

## 2020-06-08 RX ADMIN — OXYCODONE AND ACETAMINOPHEN 1 TABLET: 10; 325 TABLET ORAL at 09:06

## 2020-06-08 RX ADMIN — AMLODIPINE BESYLATE 10 MG: 10 TABLET ORAL at 09:06

## 2020-06-08 NOTE — HPI
Nazanin Malone is a 40 year old F with SCD, HTN, asthma presenting with 5 day duration of shortness of breath. Patient states dyspnea worsened 3 days ago especially with ambulation. She also reported intermittent fevers, Tmax of 101. She admits to having to use her mother's oxygen set at 4L of O2 but had no improvement. Around midnight, her dyspnea was so severe, as she was so winded and lightheaded to the point of not being able to walk. This prompted her to come to the ED. She also reports intermittent mid sternal chest pain that is worsened when she tries to catch her breath. The chest pain doesn't radiate. She reports right hip pain attributed to her regular sickle cell pain. Denies abdominal pain or bowel changes. She admits to pain behind her right knee which she attributes to possibly pulling a muscle from a fall a few days ago,    Of note, patient has been hospitalized 3X within the past year for sickle cell crisis. She states her crisis is usually precipitated by menorrhagia. 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 She takes hydroxyurea for maintenance therapy. She also reports a prior history of acute chest syndrome.     On arrival, /106, patient was saturating at 84% on RA improved with 2L NC, . CTA revealed b/l segmental PE and was started on heparin gtt.

## 2020-06-08 NOTE — PLAN OF CARE
Spoke with patient to complete d/c planning assessment. Patient lives with her mother, 13 and 16yr old daughters and 2 month old Grand child. Patient's mother will be her ride home at discharge. Pharmacy verified. Patient reports her hem/onc MD has been her PCP but will want follow-up at [facility]. Independent with ADL'S. Will continue to follow for needs.     06/08/20 1125   Discharge Assessment   Assessment Type Discharge Planning Assessment   Confirmed/corrected address and phone number on facesheet? Yes   Assessment information obtained from? Patient   Expected Length of Stay (days)   (3)   Communicated expected length of stay with patient/caregiver yes   Prior to hospitilization cognitive status: Alert/Oriented   Prior to hospitalization functional status: Independent   Current cognitive status: Alert/Oriented   Current Functional Status: Independent   Facility Arrived From: Home   Lives With child(jayce), dependent;grandchild(jayce);parent(s)   Able to Return to Prior Arrangements yes   Is patient able to care for self after discharge? Yes   Who are your caregiver(s) and their phone number(s)?   (Phyllis Benson (Mother) [phone])   Patient's perception of discharge disposition home or selfcare   Readmission Within the Last 30 Days no previous admission in last 30 days   Patient currently being followed by outpatient case management? No   Patient currently receives any other outside agency services? No   Equipment Currently Used at Home grab bar   Do you have any problems affording any of your prescribed medications? No   Is the patient taking medications as prescribed? yes   Does the patient have transportation home? Yes   Transportation Anticipated family or friend will provide   Does the patient receive services at the [facility]? No   Discharge Plan A Home   Discharge Plan B Home   DME Needed Upon Discharge  nebulizer   Patient/Family in Agreement with Plan yes

## 2020-06-08 NOTE — ASSESSMENT & PLAN NOTE
Patient with SCD, asthma presenting with acute dyspnea worsened by exertion and chest pain, noted to be hypoxic and tachycardic on presentation. ECG revealed sinus tachycardia without ischemic changes or evidence of right heart strain  On 2L of oxygen via NC   CTA chest revealed bilateral segmental PE w/o saddle embolus. No evidence of right heart strain  Initiated on heparin gtt in the ED    Plan  -Continue heparin gtt  -Continue oxygen supplementation  -Encourage incentive spirometry  -DuoNebs TID- patient reports nebs helps her breathing

## 2020-06-08 NOTE — SUBJECTIVE & OBJECTIVE
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      Current Outpatient Medications on File Prior to Encounter   Medication Sig    albuterol (VENTOLIN HFA) 90 mcg/actuation inhaler Inhale 2 puffs into the lungs every 6 (six) hours as needed for Wheezing or Shortness of Breath. Rescue    albuterol-ipratropium (DUO-NEB) 2.5 mg-0.5 mg/3 mL nebulizer solution Take 3 mLs by nebulization every 6 (six) hours as needed for Wheezing or Shortness of Breath. Rescue    amLODIPine (NORVASC) 10 MG tablet TAKE 1 TABLET BY MOUTH EVERY DAY    BREO ELLIPTA 100-25 mcg/dose diskus inhaler     budesonide-formoterol 80-4.5 mcg (SYMBICORT) 80-4.5 mcg/actuation HFAA Inhale 2 puffs into the lungs 2 (two) times daily. Controller    carBAMazepine (TEGRETOL) 200 mg tablet Take 4 tablets (800 mg total) by mouth 2 (two) times daily. (Patient taking differently: Take 400 mg by mouth 2 (two) times daily. )    carvedilol (COREG) 25 MG tablet Take 1 tablet (25 mg total) by mouth 2 (two) times daily.    cyclobenzaprine (FLEXERIL) 10 MG tablet     FLUoxetine 40 MG capsule Take 1 capsule (40 mg total) by mouth once daily.    fluticasone propionate (FLONASE) 50 mcg/actuation nasal spray 1 SPRAY (50 MCG TOTAL) BY EACH NOSTRIL ROUTE ONCE DAILY.    folic acid  (FOLVITE) 1 MG tablet TAKE 1 TABLET BY MOUTH EVERY DAY    gabapentin (NEURONTIN) 400 MG capsule TAKE 2 CAPSULES (800 MG TOTAL) BY MOUTH 3 (THREE) TIMES DAILY.    hydroCHLOROthiazide (HYDRODIURIL) 25 MG tablet Take 1 tablet (25 mg total) by mouth once daily.    hydroxyurea (HYDREA) 500 mg Cap TAKE 1 CAPSULE BY MOUTH TWICE A DAY    ondansetron (ZOFRAN-ODT) 8 MG TbDL Dissolve 1 tablet (8 mg total) by mouth every 6 (six) hours as needed.    oxyCODONE-acetaminophen (PERCOCET)  mg per tablet Take 1 tablet by mouth every 4 (four) hours as needed for Pain.    promethazine (PHENERGAN) 25 MG tablet Take 1 tablet (25 mg total) by mouth every 6 (six) hours as needed for Nausea.    senna-docusate 8.6-50 mg (PERICOLACE) 8.6-50 mg per tablet Take 1 tablet by mouth 2 (two) times daily as needed for Constipation.     Family History     Problem Relation (Age of Onset)    Diabetes Mother    Heart disease Mother, Father        Tobacco Use    Smoking status: Never Smoker    Smokeless tobacco: Never Used   Substance and Sexual Activity    Alcohol use: No    Drug use: Yes     Types: Marijuana     Comment: periodically     Sexual activity: Not Currently     Birth control/protection: See Surgical Hx     Review of Systems   Constitutional: Positive for activity change. Negative for chills and fever.   Respiratory: Positive for shortness of breath and wheezing. Negative for cough.    Cardiovascular: Positive for chest pain. Negative for palpitations and leg swelling.   Gastrointestinal: Positive for nausea. Negative for abdominal distention, abdominal pain, constipation, diarrhea and vomiting.   Genitourinary: Negative for difficulty urinating and dysuria.   Musculoskeletal: Negative for arthralgias and back pain.   Neurological: Positive for light-headedness. Negative for dizziness, syncope and weakness.   Psychiatric/Behavioral: Negative for agitation, behavioral problems and confusion.     Objective:     Vital Signs (Most  Recent):  Temp: 98.4 °F (36.9 °C) (06/08/20 0223)  Pulse: 107 (06/08/20 0502)  Resp: 20 (06/08/20 0305)  BP: (!) 144/90 (06/08/20 0502)  SpO2: 95 % (06/08/20 0502) Vital Signs (24h Range):  Temp:  [98.4 °F (36.9 °C)] 98.4 °F (36.9 °C)  Pulse:  [107-121] 107  Resp:  [18-20] 20  SpO2:  [85 %-97 %] 95 %  BP: (140-172)/() 144/90     Weight: (!) 138.3 kg (305 lb)  Body mass index is 55.79 kg/m².    Physical Exam   Constitutional: She is oriented to person, place, and time. She appears well-developed and well-nourished. No distress.   HENT:   Head: Normocephalic and atraumatic.   Eyes: EOM are normal.   Neck: Normal range of motion.   Cardiovascular: Regular rhythm, normal heart sounds and intact distal pulses. Tachycardia present.   No murmur heard.  Pulmonary/Chest: Effort normal and breath sounds normal. No respiratory distress. She has no wheezes. She has no rales.   On 2L of O2 via NC   Abdominal: Soft. Bowel sounds are normal. She exhibits no distension. There is no tenderness.   Musculoskeletal: Normal range of motion. She exhibits no edema or tenderness.   Right hip tenderness   Neurological: She is alert and oriented to person, place, and time.   Skin: Skin is warm. She is not diaphoretic.   Psychiatric: She has a normal mood and affect. Her behavior is normal. Thought content normal.         CRANIAL NERVES     CN III, IV, VI   Extraocular motions are normal.        Significant Labs:   BMP:   Recent Labs   Lab 06/08/20 0319   *      K 3.3*   CL 98   CO2 27   BUN 8   CREATININE 1.0   CALCIUM 9.1     CBC:   Recent Labs   Lab 06/08/20 0319   WBC 10.27   HGB 12.1   HCT 35.2*        CMP:   Recent Labs   Lab 06/08/20 0319      K 3.3*   CL 98   CO2 27   *   BUN 8   CREATININE 1.0   CALCIUM 9.1   PROT 8.2   ALBUMIN 3.7   BILITOT 0.8   ALKPHOS 75   AST 21   ALT 9*   ANIONGAP 11   EGFRNONAA >60.0     Troponin:   Recent Labs   Lab 06/08/20  0319   TROPONINI 0.015       Significant  Imaging:  Imaging Results           CTA Chest Non-Coronary - PE Study (Final result)  Result time 06/08/20 05:12:44    Final result by Burton Jc MD (06/08/20 05:12:44)                 Impression:      Artifact is present diminishing the sensitivity of the examination, and there is no large central saddle type pulmonary embolus however multiple intraluminal filling defects within the segmental pulmonary arteries bilaterally are noted, and even when accounting for artifact the appearance is most concerning for pulmonary emboli.    Patchy ground-glass opacities within the bilateral lower lung zones as well as more confluent consolidation within the right lower lobe.  Findings may relate to evolving pulmonary infarcts, infection/pneumonia, edema, hemorrhage, or combination of the aforementioned etiologies.    This report was flagged in Epic as abnormal.    Dr. Rey reported the findings to Dr. Dalton prior to dication.    Electronically signed by resident: Patric Rey  Date:    06/08/2020  Time:    04:49    Electronically signed by: Burton Jc  Date:    06/08/2020  Time:    05:12             Narrative:    EXAMINATION:  CTA CHEST NON CORONARY    CLINICAL HISTORY:  Chest pain, acute, PE suspected, high pretest prob;    TECHNIQUE:  Low dose axial images, sagittal and coronal reformations were obtained from the thoracic inlet to the lung bases following the IV administration of 100 mL of Omnipaque 350.  Contrast timing was optimized to evaluate the pulmonary arteries.  MIP images were performed.    COMPARISON:  CT chest 04/27/2019, 03/03/2019.    FINDINGS:  Limited examination as image quality is degraded by beam hardening artifact from patient's body contacting the gantry.    Examination of the soft tissue and vascular structures at the base of the neck is unremarkable.    The thoracic aorta maintains normal caliber, contour, and course without significant atherosclerotic calcification.  There is no  evidence of aneurysmal dilation or dissection.  Incidental note is made of variant anatomy, distal origin of the right subclavian artery with retroesophageal course.    The pulmonary arteries distribute normally and demonstrate intraluminal filling defects within the segmental arteries bilaterally, although artifact is present, diminishing ability to differentiate between artifactual filling defects and thromboembolic disease, the appearance is most concerning for pulmonary thromboembolism, as there are areas that are more prominent than typical for artifact alone.  There is multifocal minute min enhancement of the arteries to the basal segments of the bilateral lower lobes.  Linear hypoattenuation within the right main pulmonary artery favored to represent streak artifact.  There is no large central saddle type pulmonary embolus.    The trachea and proximal airways are patent.    The lungs are symmetrically expanded and demonstrate patchy ground-glass opacities with a lower lung zone predominance as well as more confluent consolidation within the posterior basal segment right lower lobe.  No pneumothorax or mass.  No evidence of pleural fluid.    The heart is not enlarged.  No pericardial effusion.    There is no axillary, mediastinal, or hilar lymph node enlargement.    The esophagus maintains a normal course and caliber.    Limited images of the upper abdomen obtained during the course of this dedicated thoracic CT is negative for acute findings.    The osseous structures are within expected limits.                               X-Ray Chest 1 View (Final result)  Result time 06/08/20 03:26:05    Final result by Tarik Mata MD (06/08/20 03:26:05)                 Impression:      No acute findings.    No significant change from prior study.      Electronically signed by: Tarik Mata MD  Date:    06/08/2020  Time:    03:26             Narrative:    EXAMINATION:  XR CHEST 1 VIEW    CLINICAL  HISTORY:  Shortness of breath    TECHNIQUE:  Single frontal view of the chest was performed.    COMPARISON:  January 9, 2020.    FINDINGS:  Heart and lungs  appear unchanged when allowing for differences in technique and positioning.

## 2020-06-08 NOTE — ED PROVIDER NOTES
Encounter Date: 2020       History     Chief Complaint   Patient presents with    Shortness of Breath     Pt states feeling SOB x3 days. Pt states oxygen saturation 74% on room air after walking around at home. Pt with intermittent chest pressure x2 days. Pt states right calf started hurting at 1am with aching pain.     HPI   Patient is a 39yo female with PMHx of SSD, HTN, and asthma, who presents with 3 days of shortness of breath. Patient states that she decided to come to the ED because she started to have right calf pain around 1am and could barely make it from the bathroom to her bedroom because of her SOB. SOB is constant, minimally improved with using her mother's supplemental oxygen at home. Exacerbated by any exertion. She has been using her nebulizer machine every 4 hours without improvement in SOB. She has also had fevers (last at 8pm with temp to 101F, took tylenol at that time). Has been blowing her nose and producing green phlegm. Reports coarse cough, but she's been unable to cough anything up. Also with nausea, but denies vomiting, diarrhea, or dysuria. Denies sick contacts.     Also reports right hip pain, which is typical of her usual sickle cell crisis pain.    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    Asthma     Depression     Hypertension     Morbid obesity     Opioid dependence 2017    Pneumonia due to Streptococcus pneumoniae 2017    Right lower lobe pneumonia 2017    Sepsis due to Streptococcus pneumoniae 2017    Sickle cell-beta thalassemia disease with pain     Trigeminal neuralgia      Past Surgical History:   Procedure Laterality Date     SECTION      TONSILLECTOMY      TUBAL LIGATION       Family History   Problem Relation Age of Onset    Heart disease Mother      Diabetes Mother     Heart disease Father     Breast cancer Neg Hx     Ovarian cancer Neg Hx     Colon cancer Neg Hx      Social History     Tobacco Use    Smoking status: Never Smoker    Smokeless tobacco: Never Used   Substance Use Topics    Alcohol use: No    Drug use: Yes     Types: Marijuana     Comment: periodically      Review of Systems   Constitutional: Positive for fever.   HENT: Positive for congestion. Negative for ear pain and sore throat.    Eyes: Negative for visual disturbance.   Respiratory: Positive for cough and shortness of breath.    Cardiovascular: Positive for chest pain.   Gastrointestinal: Positive for nausea. Negative for vomiting.   Genitourinary: Negative for dysuria.   Musculoskeletal: Positive for myalgias.   Skin: Negative for rash.   Neurological: Negative for weakness and light-headedness.       Physical Exam     Initial Vitals [06/08/20 0202]   BP Pulse Resp Temp SpO2   (!) 172/106 (!) 121 18 -- (!) 85 %      MAP       --         Physical Exam    Nursing note and vitals reviewed.  Constitutional: She appears well-developed and well-nourished. She is not diaphoretic. She appears distressed.   HENT:   Head: Normocephalic and atraumatic.   Mouth/Throat: Oropharynx is clear and moist.   Eyes: Conjunctivae and EOM are normal. No scleral icterus.   Neck: Normal range of motion. Neck supple.   Cardiovascular: Regular rhythm, normal heart sounds and intact distal pulses. Tachycardia present.    Pulmonary/Chest: Accessory muscle usage present. Tachypnea noted. She is in respiratory distress. She has wheezes in the right lower field and the left lower field.   Abdominal: Soft. Bowel sounds are normal. She exhibits no distension. There is no tenderness.   Musculoskeletal: Normal range of motion. She exhibits tenderness (TTP of right calf). She exhibits no edema.   Neurological: She is alert. She has normal strength. No sensory deficit.   Skin: Skin is warm and dry. Capillary refill  takes less than 2 seconds. No rash noted.         ED Course   Procedures  Labs Reviewed   SARS-COV-2 RNA AMPLIFICATION, QUAL   CBC W/ AUTO DIFFERENTIAL   COMPREHENSIVE METABOLIC PANEL   RETICULOCYTES   TROPONIN I   B-TYPE NATRIURETIC PEPTIDE   APTT   PROTIME-INR          Imaging Results    None          Medical Decision Making:   History:   Old Medical Records: I decided to obtain old medical records.  Old Records Summarized: records from previous admission(s) and records from clinic visits.  Initial Assessment:   Patient is a 39yo female with PMHx of SSD, HTN, and asthma, who presents with 3 days of shortness of breath.  Differential Diagnosis:   PE, ACS, MI, PNA, asthma exacerbation, acute chest syndrome, PTX, Covid-19, sickle cell crisis, DVT, sepsis,  Independently Interpreted Test(s):   I have ordered and independently interpreted EKG Reading(s) - see summary below       <> Summary of EKG Reading(s): Sinus tachycardia, no acute changes in ST- or T-waves  Clinical Tests:   Lab Tests: Ordered and Reviewed  Radiological Study: Ordered and Reviewed  Medical Tests: Ordered and Reviewed  ED Management:  Patient presenting with oxygen requirement (sating 85% on RA) and tachycardia. She is currently afebrile and nontoxic-appearing, but does have notable increased work of breathing. Does have risk factors for PE. Lab work up as below, consistent with sickle cell crisis. No elevation in WBC count. EKG without acute changes. CXR without acute change from prior. PE study pending. Improvement in breathing with albuterol MDI and duo-neb treatment. Pain improving with dilaudid and IV fluids. Providing fluids cautiously given history of heart failure with reduced EF (though most recent EF 60%). Anticipate admission to hospital medicine. Final disposition of patient pending review and re-evaluation by Dr. Dalton.              Attending Attestation:   Physician Attestation Statement for Resident:  As the supervising MD    Physician Attestation Statement: I have personally seen and examined this patient.   I agree with the above history. -:   As the supervising MD I agree with the above PE.    As the supervising MD I agree with the above treatment, course, plan, and disposition.   -:   40-year-old female with history of sickle cell disease presenting to emergency department with complaint of shortness of breath, found to have bilateral segmental PEs.  She is tachycardic, hypoxic but improved on nasal cannula.  Blood pressures are stable.  She is heparinized, and admitted to Internal Medicine.  No elevation of her troponin.  EKG is nonischemic.  CT without evidence of right heart strain.  COVID negative.  No evidence of superimposed infection.  I have reviewed and agree with the residents interpretation of the following: lab data, x-rays, EKG and CT scans.                    ED Course as of Jun 08 0440   Mon Jun 08, 2020   0409 Retic(!): 5.7 [NH]   0409 WBC: 10.27 [NH]   0409 Hemoglobin: 12.1 [NH]   0409 SARS-CoV-2 RNA, Amplification, Qual: Negative [NH]   0429 Troponin I: 0.015 [NH]   0430 Creatinine: 1.0 [NH]   0430 Potassium(!): 3.3 [NH]      ED Course User Index  [NH] Bharti Gamble MD                Clinical Impression:       ICD-10-CM ICD-9-CM   1. Hypoxia R09.02 799.02   2. Shortness of breath R06.02 786.05   3. Acute chest syndrome D57.01 282.62     517.3             ED Disposition Condition    Admit                           Bharti Gamble MD  Resident  06/08/20 0504       Marie Dalton MD  06/08/20 [id number]

## 2020-06-08 NOTE — CONSULTS
Placed 18g, 10 cm Midline in left cephalic vein using u/s guidance.  Max dwell date 7/7/2020.  Lot # [id number].

## 2020-06-08 NOTE — ED TRIAGE NOTES
Patient complaining of SOB x3 days that's worse with exertion, bloody drainage after blowing nose, productive cough, intermittent CP since this AM, right calf pain described as aching, and right hip pain consistent with sickle cell. Reports fever of 101 at 8pm. Took one Tylenol. Bilateral wheezing audible. Hx asthma and sickle cell.

## 2020-06-08 NOTE — ASSESSMENT & PLAN NOTE
Patient with SCD presenting with acute SOB, chest pain and fever noted to have bilateral PE.   Also concern for possible acute chest syndrome  Pain regimen includes gabapentin and perocet 10mg q4hrs.   Reticulocytes 5.7. Hgb stable at 12.1.     -Continue home hydroxyurea and folic acid   -Continous IVF w/ D51/2NS  -Pain control with percocet 10mg [id number] and dilaudid 1mg for breakthrough pain  -Encourage incentive spirometry

## 2020-06-08 NOTE — NURSING
Critical results for APTT is >150. Heparin will paused for 2 hours and will resume 1930. Heparin GTT will be decreased by 4unit/kg/hr

## 2020-06-08 NOTE — H&P
[facility] Medicine  History & Physical    Patient Name: Nazanin Malone  MRN: [id number]  Admission Date: 6/8/2020  Attending Physician: Selvin Stern MD   Primary Care Provider: [facility] Medicine Team: [facility] MED 4 Kuldeep Campo MD     Patient information was obtained from patient and past medical records.     Subjective:     Principal Problem:Pulmonary embolism    Chief Complaint:   Chief Complaint   Patient presents with    Shortness of Breath     Pt states feeling SOB x3 days. Pt states oxygen saturation 74% on room air after walking around at home. Pt with intermittent chest pressure x2 days. Pt states right calf started hurting at 1am with aching pain.        HPI: Nazanin Malone is a 40 year old F with SCD, HTN, asthma presenting with 5 day duration of shortness of breath. Patient states dyspnea worsened 3 days ago especially with ambulation. She also reported intermittent fevers, Tmax of 101. She admits to having to use her mother's oxygen set at 4L of O2 but had no improvement. Around midnight, her dyspnea was so severe, as she was so winded and lightheaded to the point of not being able to walk. This prompted her to come to the ED. She also reports intermittent mid sternal chest pain that is worsened when she tries to catch her breath. The chest pain doesn't radiate. She reports right hip pain attributed to her regular sickle cell pain. Denies abdominal pain or bowel changes. She admits to pain behind her right knee which she attributes to possibly pulling a muscle from a fall a few days ago,    Of note, patient has been hospitalized 3X within the past year for sickle cell crisis. She states her crisis is usually precipitated by menorrhagia. 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 She takes hydroxyurea for maintenance therapy. She also  reports a prior history of acute chest syndrome.     On arrival, /106, patient was saturating at 84% on RA improved with 2L NC, . CTA revealed b/l segmental PE and was started on heparin gtt.    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      Current Outpatient Medications on File Prior to Encounter   Medication Sig    albuterol (VENTOLIN HFA) 90 mcg/actuation inhaler Inhale 2 puffs into the lungs every 6 (six) hours as needed for Wheezing or Shortness of Breath. Rescue    albuterol-ipratropium (DUO-NEB) 2.5 mg-0.5 mg/3 mL nebulizer solution Take 3 mLs by nebulization every 6 (six) hours as needed for Wheezing or Shortness of Breath. Rescue    amLODIPine (NORVASC) 10 MG tablet TAKE 1 TABLET BY MOUTH EVERY DAY    BREO ELLIPTA 100-25 mcg/dose diskus inhaler     budesonide-formoterol 80-4.5 mcg (SYMBICORT) 80-4.5 mcg/actuation HFAA Inhale 2 puffs into the lungs 2 (two) times daily. Controller    carBAMazepine (TEGRETOL) 200 mg tablet Take 4 tablets (800 mg total) by mouth 2 (two) times daily. (Patient taking differently: Take 400 mg by mouth 2 (two) times daily. )    carvedilol (COREG) 25 MG tablet Take 1 tablet (25 mg total) by mouth 2 (two) times daily.    cyclobenzaprine (FLEXERIL) 10 MG tablet      FLUoxetine 40 MG capsule Take 1 capsule (40 mg total) by mouth once daily.    fluticasone propionate (FLONASE) 50 mcg/actuation nasal spray 1 SPRAY (50 MCG TOTAL) BY EACH NOSTRIL ROUTE ONCE DAILY.    folic acid (FOLVITE) 1 MG tablet TAKE 1 TABLET BY MOUTH EVERY DAY    gabapentin (NEURONTIN) 400 MG capsule TAKE 2 CAPSULES (800 MG TOTAL) BY MOUTH 3 (THREE) TIMES DAILY.    hydroCHLOROthiazide (HYDRODIURIL) 25 MG tablet Take 1 tablet (25 mg total) by mouth once daily.    hydroxyurea (HYDREA) 500 mg Cap TAKE 1 CAPSULE BY MOUTH TWICE A DAY    ondansetron (ZOFRAN-ODT) 8 MG TbDL Dissolve 1 tablet (8 mg total) by mouth every 6 (six) hours as needed.    oxyCODONE-acetaminophen (PERCOCET)  mg per tablet Take 1 tablet by mouth every 4 (four) hours as needed for Pain.    promethazine (PHENERGAN) 25 MG tablet Take 1 tablet (25 mg total) by mouth every 6 (six) hours as needed for Nausea.    senna-docusate 8.6-50 mg (PERICOLACE) 8.6-50 mg per tablet Take 1 tablet by mouth 2 (two) times daily as needed for Constipation.     Family History     Problem Relation (Age of Onset)    Diabetes Mother    Heart disease Mother, Father        Tobacco Use    Smoking status: Never Smoker    Smokeless tobacco: Never Used   Substance and Sexual Activity    Alcohol use: No    Drug use: Yes     Types: Marijuana     Comment: periodically     Sexual activity: Not Currently     Birth control/protection: See Surgical Hx     Review of Systems   Constitutional: Positive for activity change. Negative for chills and fever.   Respiratory: Positive for shortness of breath and wheezing. Negative for cough.    Cardiovascular: Positive for chest pain. Negative for palpitations and leg swelling.   Gastrointestinal: Positive for nausea. Negative for abdominal distention, abdominal pain, constipation, diarrhea and vomiting.   Genitourinary: Negative for difficulty urinating and dysuria.   Musculoskeletal: Negative for arthralgias and back pain.    Neurological: Positive for light-headedness. Negative for dizziness, syncope and weakness.   Psychiatric/Behavioral: Negative for agitation, behavioral problems and confusion.     Objective:     Vital Signs (Most Recent):  Temp: 98.4 °F (36.9 °C) (06/08/20 0223)  Pulse: 107 (06/08/20 0502)  Resp: 20 (06/08/20 0305)  BP: (!) 144/90 (06/08/20 0502)  SpO2: 95 % (06/08/20 0502) Vital Signs (24h Range):  Temp:  [98.4 °F (36.9 °C)] 98.4 °F (36.9 °C)  Pulse:  [107-121] 107  Resp:  [18-20] 20  SpO2:  [85 %-97 %] 95 %  BP: (140-172)/() 144/90     Weight: (!) 138.3 kg (305 lb)  Body mass index is 55.79 kg/m².    Physical Exam   Constitutional: She is oriented to person, place, and time. She appears well-developed and well-nourished. No distress.   HENT:   Head: Normocephalic and atraumatic.   Eyes: EOM are normal.   Neck: Normal range of motion.   Cardiovascular: Regular rhythm, normal heart sounds and intact distal pulses. Tachycardia present.   No murmur heard.  Pulmonary/Chest: Effort normal and breath sounds normal. No respiratory distress. She has no wheezes. She has no rales.   On 2L of O2 via NC   Abdominal: Soft. Bowel sounds are normal. She exhibits no distension. There is no tenderness.   Musculoskeletal: Normal range of motion. She exhibits no edema or tenderness.   Right hip tenderness   Neurological: She is alert and oriented to person, place, and time.   Skin: Skin is warm. She is not diaphoretic.   Psychiatric: She has a normal mood and affect. Her behavior is normal. Thought content normal.         CRANIAL NERVES     CN III, IV, VI   Extraocular motions are normal.        Significant Labs:   BMP:   Recent Labs   Lab 06/08/20 0319   *      K 3.3*   CL 98   CO2 27   BUN 8   CREATININE 1.0   CALCIUM 9.1     CBC:   Recent Labs   Lab 06/08/20 0319   WBC 10.27   HGB 12.1   HCT 35.2*        CMP:   Recent Labs   Lab 06/08/20 0319      K 3.3*   CL 98   CO2 27   *   BUN 8    CREATININE 1.0   CALCIUM 9.1   PROT 8.2   ALBUMIN 3.7   BILITOT 0.8   ALKPHOS 75   AST 21   ALT 9*   ANIONGAP 11   EGFRNONAA >60.0     Troponin:   Recent Labs   Lab 06/08/20  0319   TROPONINI 0.015       Significant Imaging:  Imaging Results           CTA Chest Non-Coronary - PE Study (Final result)  Result time 06/08/20 05:12:44    Final result by Burton Jc MD (06/08/20 05:12:44)                 Impression:      Artifact is present diminishing the sensitivity of the examination, and there is no large central saddle type pulmonary embolus however multiple intraluminal filling defects within the segmental pulmonary arteries bilaterally are noted, and even when accounting for artifact the appearance is most concerning for pulmonary emboli.    Patchy ground-glass opacities within the bilateral lower lung zones as well as more confluent consolidation within the right lower lobe.  Findings may relate to evolving pulmonary infarcts, infection/pneumonia, edema, hemorrhage, or combination of the aforementioned etiologies.    This report was flagged in Epic as abnormal.    Dr. Rey reported the findings to Dr. Dalton prior to dication.    Electronically signed by resident: Patric Rey  Date:    06/08/2020  Time:    04:49    Electronically signed by: Burton Jc  Date:    06/08/2020  Time:    05:12             Narrative:    EXAMINATION:  CTA CHEST NON CORONARY    CLINICAL HISTORY:  Chest pain, acute, PE suspected, high pretest prob;    TECHNIQUE:  Low dose axial images, sagittal and coronal reformations were obtained from the thoracic inlet to the lung bases following the IV administration of 100 mL of Omnipaque 350.  Contrast timing was optimized to evaluate the pulmonary arteries.  MIP images were performed.    COMPARISON:  CT chest 04/27/2019, 03/03/2019.    FINDINGS:  Limited examination as image quality is degraded by beam hardening artifact from patient's body contacting the  gantry.    Examination of the soft tissue and vascular structures at the base of the neck is unremarkable.    The thoracic aorta maintains normal caliber, contour, and course without significant atherosclerotic calcification.  There is no evidence of aneurysmal dilation or dissection.  Incidental note is made of variant anatomy, distal origin of the right subclavian artery with retroesophageal course.    The pulmonary arteries distribute normally and demonstrate intraluminal filling defects within the segmental arteries bilaterally, although artifact is present, diminishing ability to differentiate between artifactual filling defects and thromboembolic disease, the appearance is most concerning for pulmonary thromboembolism, as there are areas that are more prominent than typical for artifact alone.  There is multifocal minute min enhancement of the arteries to the basal segments of the bilateral lower lobes.  Linear hypoattenuation within the right main pulmonary artery favored to represent streak artifact.  There is no large central saddle type pulmonary embolus.    The trachea and proximal airways are patent.    The lungs are symmetrically expanded and demonstrate patchy ground-glass opacities with a lower lung zone predominance as well as more confluent consolidation within the posterior basal segment right lower lobe.  No pneumothorax or mass.  No evidence of pleural fluid.    The heart is not enlarged.  No pericardial effusion.    There is no axillary, mediastinal, or hilar lymph node enlargement.    The esophagus maintains a normal course and caliber.    Limited images of the upper abdomen obtained during the course of this dedicated thoracic CT is negative for acute findings.    The osseous structures are within expected limits.                               X-Ray Chest 1 View (Final result)  Result time 06/08/20 03:26:05    Final result by Tarik Mata MD (06/08/20 03:26:05)                  Impression:      No acute findings.    No significant change from prior study.      Electronically signed by: Tarik Mata MD  Date:    06/08/2020  Time:    03:26             Narrative:    EXAMINATION:  XR CHEST 1 VIEW    CLINICAL HISTORY:  Shortness of breath    TECHNIQUE:  Single frontal view of the chest was performed.    COMPARISON:  January 9, 2020.    FINDINGS:  Heart and lungs  appear unchanged when allowing for differences in technique and positioning.                                  Assessment/Plan:     * Pulmonary embolism  Patient with SCD, asthma presenting with acute dyspnea worsened by exertion and chest pain, noted to be hypoxic and tachycardic on presentation. ECG revealed sinus tachycardia without ischemic changes or evidence of right heart strain  On 2L of oxygen via NC   CTA chest revealed bilateral segmental PE w/o saddle embolus. No evidence of right heart strain  Initiated on heparin gtt in the ED    Plan  -Continue heparin gtt  -Continue oxygen supplementation  -Encourage incentive spirometry  -DuoNebs TID- patient reports nebs helps her breathing          Sickle-cell disease with pain  Patient with SCD presenting with acute SOB, chest pain and fever noted to have bilateral PE.   Also concern for possible acute chest syndrome  Pain regimen includes gabapentin and perocet 10mg q4hrs.   Reticulocytes 5.7. Hgb stable at 12.1.     -Continue home hydroxyurea and folic acid   -Continous IVF w/ D51/2NS  -Pain control with percocet 10mg [id number] and dilaudid 1mg for breakthrough pain  -Encourage incentive spirometry           Intermittent asthma without complication  Continue with ICS/LABA and Duonebs PRN       Trigeminal neuralgia  Continue carbamazepine       Essential hypertension  Continue home coreg 25mg BID, HCTZ 25mg qD and amlodipine 10mg        VTE Risk Mitigation (From admission, onward)         Ordered     heparin 25,000 units in dextrose 5% (100 units/ml) IV bolus from bag INITIAL  BOLUS  Once     Question:  Heparin Infusion Adjustment (DO NOT MODIFY ANSWER)  Answer:  [email or web address]\Images\Pharmacy\HeparinInfusions\heparin HIGH INTENSITY nomogram for [facility] [id number]    06/08/20 0511     heparin 25,000 units in dextrose 5% 250 mL (100 units/mL) infusion HIGH INTENSITY nomogram - [facility]  Continuous     Question:  Heparin Infusion Adjustment (DO NOT MODIFY ANSWER)  Answer:  [email or web address]\Images\Pharmacy\HeparinInfusions\heparin HIGH INTENSITY nomogram for [facility] [id number]    06/08/20 0511     heparin 25,000 units in dextrose 5% (100 units/ml) IV bolus from bag - ADDITIONAL PRN BOLUS - 60 units/kg  As needed (PRN)     Question:  Heparin Infusion Adjustment (DO NOT MODIFY ANSWER)  Answer:  [email or web address]\epic\Images\Pharmacy\HeparinInfusions\heparin HIGH INTENSITY nomogram for [facility] [id number]    06/08/20 0511     heparin 25,000 units in dextrose 5% (100 units/ml) IV bolus from bag - ADDITIONAL PRN BOLUS - 30 units/kg  As needed (PRN)     Question:  Heparin Infusion Adjustment (DO NOT MODIFY ANSWER)  Answer:  [email or web address]\Images\Pharmacy\HeparinInfusions\heparin HIGH INTENSITY nomogram for [facility] [id number]    06/08/20 0511     Reason for No Pharmacological VTE Prophylaxis  Once     Question:  Reasons:  Answer:  Already adequately anticoagulated on oral Anticoagulants    06/08/20 0556     IP VTE HIGH RISK PATIENT  Once      06/08/20 0556                   Kuldeep Campo MD  Department of Hospital Medicine   [facility]

## 2020-06-09 PROBLEM — J18.9 CAP (COMMUNITY ACQUIRED PNEUMONIA): Status: ACTIVE | Noted: 2020-06-09

## 2020-06-09 LAB
ALBUMIN SERPL BCP-MCNC: 3.4 G/DL (ref 3.5–5.2)
ALP SERPL-CCNC: 67 U/L (ref 55–135)
ALT SERPL W/O P-5'-P-CCNC: 8 U/L (ref 10–44)
ANION GAP SERPL CALC-SCNC: 13 MMOL/L (ref 8–16)
APTT BLDCRRT: 25.6 SEC (ref 21–32)
APTT BLDCRRT: 45.3 SEC (ref 21–32)
APTT BLDCRRT: 87.9 SEC (ref 21–32)
AST SERPL-CCNC: 19 U/L (ref 10–40)
BASOPHILS # BLD AUTO: 0.04 K/UL (ref 0–0.2)
BASOPHILS NFR BLD: 0.4 % (ref 0–1.9)
BILIRUB SERPL-MCNC: 0.6 MG/DL (ref 0.1–1)
BUN SERPL-MCNC: 10 MG/DL (ref 6–20)
CALCIUM SERPL-MCNC: 8.3 MG/DL (ref 8.7–10.5)
CHLORIDE SERPL-SCNC: 100 MMOL/L (ref 95–110)
CO2 SERPL-SCNC: 23 MMOL/L (ref 23–29)
CREAT SERPL-MCNC: 0.8 MG/DL (ref 0.5–1.4)
DIFFERENTIAL METHOD: ABNORMAL
EOSINOPHIL # BLD AUTO: 0.3 K/UL (ref 0–0.5)
EOSINOPHIL NFR BLD: 2.3 % (ref 0–8)
ERYTHROCYTE [DISTWIDTH] IN BLOOD BY AUTOMATED COUNT: 18.2 % (ref 11.5–14.5)
EST. GFR  (AFRICAN AMERICAN): >60 ML/MIN/1.73 M^2
EST. GFR  (NON AFRICAN AMERICAN): >60 ML/MIN/1.73 M^2
GLUCOSE SERPL-MCNC: 132 MG/DL (ref 70–110)
HCT VFR BLD AUTO: 30.5 % (ref 37–48.5)
HGB BLD-MCNC: 10.6 G/DL (ref 12–16)
IMM GRANULOCYTES # BLD AUTO: 0.07 K/UL (ref 0–0.04)
IMM GRANULOCYTES NFR BLD AUTO: 0.7 % (ref 0–0.5)
LYMPHOCYTES # BLD AUTO: 4.4 K/UL (ref 1–4.8)
LYMPHOCYTES NFR BLD: 41.2 % (ref 18–48)
MAGNESIUM SERPL-MCNC: 2.1 MG/DL (ref 1.6–2.6)
MCH RBC QN AUTO: 30.9 PG (ref 27–31)
MCHC RBC AUTO-ENTMCNC: 34.8 G/DL (ref 32–36)
MCV RBC AUTO: 89 FL (ref 82–98)
MONOCYTES # BLD AUTO: 1.2 K/UL (ref 0.3–1)
MONOCYTES NFR BLD: 11.1 % (ref 4–15)
NEUTROPHILS # BLD AUTO: 4.8 K/UL (ref 1.8–7.7)
NEUTROPHILS NFR BLD: 44.3 % (ref 38–73)
NRBC BLD-RTO: 40 /100 WBC
PHOSPHATE SERPL-MCNC: 4 MG/DL (ref 2.7–4.5)
PLATELET # BLD AUTO: 147 K/UL (ref 150–350)
PMV BLD AUTO: 11.9 FL (ref 9.2–12.9)
POTASSIUM SERPL-SCNC: 3.5 MMOL/L (ref 3.5–5.1)
PROT SERPL-MCNC: 7.6 G/DL (ref 6–8.4)
RBC # BLD AUTO: 3.43 M/UL (ref 4–5.4)
SODIUM SERPL-SCNC: 136 MMOL/L (ref 136–145)
WBC # BLD AUTO: 10.74 K/UL (ref 3.9–12.7)

## 2020-06-09 PROCEDURE — 94640 AIRWAY INHALATION TREATMENT: CPT

## 2020-06-09 PROCEDURE — 11000001 HC ACUTE MED/SURG PRIVATE ROOM

## 2020-06-09 PROCEDURE — 85730 THROMBOPLASTIN TIME PARTIAL: CPT

## 2020-06-09 PROCEDURE — 85025 COMPLETE CBC W/AUTO DIFF WBC: CPT

## 2020-06-09 PROCEDURE — 99233 SBSQ HOSP IP/OBS HIGH 50: CPT | Mod: ,,, | Performed by: HOSPITALIST

## 2020-06-09 PROCEDURE — 99900035 HC TECH TIME PER 15 MIN (STAT)

## 2020-06-09 PROCEDURE — 27000221 HC OXYGEN, UP TO 24 HOURS

## 2020-06-09 PROCEDURE — 25000003 PHARM REV CODE 250: Performed by: STUDENT IN AN ORGANIZED HEALTH CARE EDUCATION/TRAINING PROGRAM

## 2020-06-09 PROCEDURE — 63700000 PHARM REV CODE 250 ALT 637 W/O HCPCS: Performed by: STUDENT IN AN ORGANIZED HEALTH CARE EDUCATION/TRAINING PROGRAM

## 2020-06-09 PROCEDURE — 83735 ASSAY OF MAGNESIUM: CPT

## 2020-06-09 PROCEDURE — 63600175 PHARM REV CODE 636 W HCPCS: Performed by: STUDENT IN AN ORGANIZED HEALTH CARE EDUCATION/TRAINING PROGRAM

## 2020-06-09 PROCEDURE — 94799 UNLISTED PULMONARY SVC/PX: CPT

## 2020-06-09 PROCEDURE — 94761 N-INVAS EAR/PLS OXIMETRY MLT: CPT

## 2020-06-09 PROCEDURE — 25000242 PHARM REV CODE 250 ALT 637 W/ HCPCS: Performed by: STUDENT IN AN ORGANIZED HEALTH CARE EDUCATION/TRAINING PROGRAM

## 2020-06-09 PROCEDURE — 36415 COLL VENOUS BLD VENIPUNCTURE: CPT

## 2020-06-09 PROCEDURE — 84100 ASSAY OF PHOSPHORUS: CPT

## 2020-06-09 PROCEDURE — 99233 PR SUBSEQUENT HOSPITAL CARE,LEVL III: ICD-10-PCS | Mod: ,,, | Performed by: HOSPITALIST

## 2020-06-09 PROCEDURE — 63600175 PHARM REV CODE 636 W HCPCS: Performed by: EMERGENCY MEDICINE

## 2020-06-09 PROCEDURE — 80053 COMPREHEN METABOLIC PANEL: CPT

## 2020-06-09 PROCEDURE — 25000242 PHARM REV CODE 250 ALT 637 W/ HCPCS: Performed by: HOSPITALIST

## 2020-06-09 RX ORDER — HYDROMORPHONE HYDROCHLORIDE 1 MG/ML
2 INJECTION, SOLUTION INTRAMUSCULAR; INTRAVENOUS; SUBCUTANEOUS EVERY 8 HOURS PRN
Status: DISCONTINUED | OUTPATIENT
Start: 2020-06-09 | End: 2020-06-10

## 2020-06-09 RX ORDER — GUAIFENESIN 600 MG/1
600 TABLET, EXTENDED RELEASE ORAL 2 TIMES DAILY
Status: DISCONTINUED | OUTPATIENT
Start: 2020-06-09 | End: 2020-06-11 | Stop reason: HOSPADM

## 2020-06-09 RX ORDER — AZITHROMYCIN 250 MG/1
500 TABLET, FILM COATED ORAL ONCE
Status: COMPLETED | OUTPATIENT
Start: 2020-06-09 | End: 2020-06-09

## 2020-06-09 RX ORDER — HYDROCHLOROTHIAZIDE 25 MG/1
50 TABLET ORAL DAILY
Status: DISCONTINUED | OUTPATIENT
Start: 2020-06-09 | End: 2020-06-11 | Stop reason: HOSPADM

## 2020-06-09 RX ORDER — AZITHROMYCIN 250 MG/1
250 TABLET, FILM COATED ORAL DAILY
Status: DISCONTINUED | OUTPATIENT
Start: 2020-06-10 | End: 2020-06-11 | Stop reason: HOSPADM

## 2020-06-09 RX ORDER — CEFTRIAXONE 1 G/1
1 INJECTION, POWDER, FOR SOLUTION INTRAMUSCULAR; INTRAVENOUS
Status: DISCONTINUED | OUTPATIENT
Start: 2020-06-09 | End: 2020-06-11

## 2020-06-09 RX ADMIN — SENNOSIDES AND DOCUSATE SODIUM 1 TABLET: 8.6; 5 TABLET ORAL at 08:06

## 2020-06-09 RX ADMIN — HYDROMORPHONE HYDROCHLORIDE 2 MG: 1 INJECTION, SOLUTION INTRAMUSCULAR; INTRAVENOUS; SUBCUTANEOUS at 10:06

## 2020-06-09 RX ADMIN — IPRATROPIUM BROMIDE AND ALBUTEROL SULFATE 3 ML: .5; 3 SOLUTION RESPIRATORY (INHALATION) at 09:06

## 2020-06-09 RX ADMIN — AMLODIPINE BESYLATE 10 MG: 10 TABLET ORAL at 09:06

## 2020-06-09 RX ADMIN — IPRATROPIUM BROMIDE AND ALBUTEROL SULFATE 3 ML: .5; 3 SOLUTION RESPIRATORY (INHALATION) at 03:06

## 2020-06-09 RX ADMIN — OXYCODONE AND ACETAMINOPHEN 1 TABLET: 10; 325 TABLET ORAL at 05:06

## 2020-06-09 RX ADMIN — HYDROMORPHONE HYDROCHLORIDE 2 MG: 1 INJECTION, SOLUTION INTRAMUSCULAR; INTRAVENOUS; SUBCUTANEOUS at 04:06

## 2020-06-09 RX ADMIN — OXYCODONE AND ACETAMINOPHEN 1 TABLET: 10; 325 TABLET ORAL at 01:06

## 2020-06-09 RX ADMIN — HYDROXYZINE HYDROCHLORIDE 25 MG: 25 TABLET, FILM COATED ORAL at 10:06

## 2020-06-09 RX ADMIN — GUAIFENESIN 600 MG: 600 TABLET, EXTENDED RELEASE ORAL at 03:06

## 2020-06-09 RX ADMIN — GABAPENTIN 800 MG: 400 CAPSULE ORAL at 09:06

## 2020-06-09 RX ADMIN — FOLIC ACID 1000 MCG: 1 TABLET ORAL at 08:06

## 2020-06-09 RX ADMIN — HEPARIN SODIUM AND DEXTROSE 11 UNITS/KG/HR: 10000; 5 INJECTION INTRAVENOUS at 03:06

## 2020-06-09 RX ADMIN — CARBAMAZEPINE 400 MG: 200 TABLET ORAL at 08:06

## 2020-06-09 RX ADMIN — HYDROMORPHONE HYDROCHLORIDE 2 MG: 1 INJECTION, SOLUTION INTRAMUSCULAR; INTRAVENOUS; SUBCUTANEOUS at 02:06

## 2020-06-09 RX ADMIN — HYDROXYUREA 500 MG: 500 CAPSULE ORAL at 09:06

## 2020-06-09 RX ADMIN — CARVEDILOL 25 MG: 25 TABLET, FILM COATED ORAL at 08:06

## 2020-06-09 RX ADMIN — AZITHROMYCIN 500 MG: 250 TABLET, FILM COATED ORAL at 11:06

## 2020-06-09 RX ADMIN — APIXABAN 10 MG: 5 TABLET, FILM COATED ORAL at 09:06

## 2020-06-09 RX ADMIN — GABAPENTIN 800 MG: 400 CAPSULE ORAL at 02:06

## 2020-06-09 RX ADMIN — FLUTICASONE PROPIONATE 50 MCG: 50 SPRAY, METERED NASAL at 08:06

## 2020-06-09 RX ADMIN — CARVEDILOL 25 MG: 25 TABLET, FILM COATED ORAL at 09:06

## 2020-06-09 RX ADMIN — FLUTICASONE FUROATE AND VILANTEROL TRIFENATATE 1 PUFF: 100; 25 POWDER RESPIRATORY (INHALATION) at 09:06

## 2020-06-09 RX ADMIN — HYDROXYZINE HYDROCHLORIDE 25 MG: 25 TABLET, FILM COATED ORAL at 04:06

## 2020-06-09 RX ADMIN — HYDROMORPHONE HYDROCHLORIDE 2 MG: 1 INJECTION, SOLUTION INTRAMUSCULAR; INTRAVENOUS; SUBCUTANEOUS at 03:06

## 2020-06-09 RX ADMIN — HYDROCHLOROTHIAZIDE 50 MG: 25 TABLET ORAL at 08:06

## 2020-06-09 RX ADMIN — FLUOXETINE 40 MG: 20 CAPSULE ORAL at 08:06

## 2020-06-09 RX ADMIN — APIXABAN 10 MG: 5 TABLET, FILM COATED ORAL at 02:06

## 2020-06-09 RX ADMIN — HYDROXYUREA 500 MG: 500 CAPSULE ORAL at 08:06

## 2020-06-09 RX ADMIN — SENNOSIDES AND DOCUSATE SODIUM 1 TABLET: 8.6; 5 TABLET ORAL at 09:06

## 2020-06-09 RX ADMIN — GUAIFENESIN 600 MG: 600 TABLET, EXTENDED RELEASE ORAL at 09:06

## 2020-06-09 RX ADMIN — CARBAMAZEPINE 400 MG: 200 TABLET ORAL at 09:06

## 2020-06-09 RX ADMIN — CEFTRIAXONE SODIUM 1 G: 1 INJECTION, POWDER, FOR SOLUTION INTRAMUSCULAR; INTRAVENOUS at 11:06

## 2020-06-09 NOTE — PLAN OF CARE
Problem: Fall Injury Risk  Goal: Absence of Fall and Fall-Related Injury  Outcome: Ongoing, Progressing     Problem: Adult Inpatient Plan of Care  Goal: Plan of Care Review  Outcome: Ongoing, Progressing  Goal: Patient-Specific Goal (Individualization)  Outcome: Ongoing, Progressing  Goal: Absence of Hospital-Acquired Illness or Injury  Outcome: Ongoing, Progressing  Goal: Optimal Comfort and Wellbeing  Outcome: Ongoing, Progressing  Goal: Readiness for Transition of Care  Outcome: Ongoing, Progressing  Goal: Rounds/Family Conference  Outcome: Ongoing, Progressing     Problem: Bariatric Environmental Safety  Goal: Safety Maintained with Care  Outcome: Ongoing, Progressing     Problem: Infection  Goal: Infection Symptom Resolution  Outcome: Ongoing, Progressing     Problem: Fluid Imbalance (Pneumonia)  Goal: Fluid Balance  Outcome: Ongoing, Progressing     Problem: Infection (Pneumonia)  Goal: Resolution of Infection Signs/Symptoms  Outcome: Ongoing, Progressing     Problem: Respiratory Compromise (Pneumonia)  Goal: Effective Oxygenation and Ventilation  Outcome: Ongoing, Progressing

## 2020-06-09 NOTE — HOSPITAL COURSE
pt with h/o SCD who presents to the hospital with inc shortness of breath and CP. CTA revealed bilateral segmental PE w/o saddle embolus along with an area of consolidation concerning for inection. Treating for PE and CAP. pt also with significant R thigh pain. Will transition patient to PO opioid medication over the next 1-2 days. Will also get CT of right hip to further evaluate for AVN. Continue supplemental O2 as needed with eliquis and CAP coverage.

## 2020-06-09 NOTE — ASSESSMENT & PLAN NOTE
Patient with SCD, asthma presenting with acute dyspnea worsened by exertion and chest pain, noted to be hypoxic and tachycardic on presentation. ECG revealed sinus tachycardia without ischemic changes or evidence of right heart strain  On 2L of oxygen via NC   CTA chest revealed bilateral segmental PE w/o saddle embolus. No evidence of right heart strain  Initiated on heparin gtt in the ED    Plan  -will transition to Eliquis from heparin gtt  -Continue oxygen supplementation- wean off as tolerated  -Encourage incentive spirometry  -DuoNebs TID- patient reports nebs helps her breathing

## 2020-06-09 NOTE — SUBJECTIVE & OBJECTIVE
Interval History: pt continues to c/o R hip pain despite 2 mg IV dialudid q6h. Will add additional q8h for breakthrough pain. NAEON. Remained afebrile. HR stable. Cont to be on heparin. Will transition to eliquis today.       Review of Systems   Constitutional: Negative for activity change, chills, diaphoresis and fever.   HENT: Negative for sore throat and trouble swallowing.    Respiratory: Positive for shortness of breath. Negative for apnea and chest tightness.    Cardiovascular: Negative for chest pain, palpitations and leg swelling.   Gastrointestinal: Negative for abdominal distention and abdominal pain.   Genitourinary: Negative for dysuria, flank pain, frequency and hematuria.   Musculoskeletal: Negative for arthralgias, back pain and myalgias.        R leg pain   Skin: Negative for color change, pallor and rash.   Neurological: Negative for weakness.   All other systems reviewed and are negative.    Objective:     Vital Signs (Most Recent):  Temp: 96.7 °F (35.9 °C) (06/09/20 1135)  Pulse: 83 (06/09/20 1135)  Resp: 18 (06/09/20 1135)  BP: 137/79 (06/09/20 1135)  SpO2: (!) 92 % (06/09/20 1135) Vital Signs (24h Range):  Temp:  [96.7 °F (35.9 °C)-98.3 °F (36.8 °C)] 96.7 °F (35.9 °C)  Pulse:  [] 83  Resp:  [16-20] 18  SpO2:  [92 %-96 %] 92 %  BP: (137-167)/() 137/79     Weight: (!) 138.3 kg (305 lb)  Body mass index is 55.79 kg/m².  No intake or output data in the 24 hours ending 06/09/20 1316   Physical Exam   Constitutional: She is oriented to person, place, and time. She appears well-developed and well-nourished. No distress.   HENT:   Head: Normocephalic and atraumatic.   Eyes: Conjunctivae are normal. No scleral icterus.   Neck: Normal range of motion. Neck supple. No JVD present. No tracheal deviation present.   Cardiovascular: Normal rate, regular rhythm and intact distal pulses. Exam reveals no gallop and no friction rub.   Pulmonary/Chest: Effort normal and breath sounds normal. No  respiratory distress. She has no wheezes.   Decreased breath sounds with occasional wheezes.  On 2L NC   Abdominal: Soft. Bowel sounds are normal. She exhibits no distension and no mass.   Musculoskeletal: Normal range of motion. She exhibits no edema or deformity.   Lymphadenopathy:     She has no cervical adenopathy.   Neurological: She is alert and oriented to person, place, and time.   Skin: Skin is warm and dry. She is not diaphoretic.   Psychiatric: She has a normal mood and affect.   Nursing note and vitals reviewed.      Significant Labs: All pertinent labs within the past 24 hours have been reviewed.    Significant Imaging: I have reviewed and interpreted all pertinent imaging results/findings within the past 24 hours.

## 2020-06-09 NOTE — PROGRESS NOTES
[facility] Hospital Medicine  Progress Note    Patient Name: Nazanin Malone  MRN: [id number]  Patient Class: IP- Inpatient   Admission Date: 6/8/2020  Length of Stay: 1 days  Attending Physician: Selvin Stern MD  Primary Care Provider: Primary Doctor [facility] Medicine Team: [facility]OSP MED 4 Jessica Arellano MD    Subjective:     Principal Problem:Pulmonary embolism        HPI:  Nazanin Malone is a 40 year old F with SCD, HTN, asthma presenting with 5 day duration of shortness of breath. Patient states dyspnea worsened 3 days ago especially with ambulation. She also reported intermittent fevers, Tmax of 101. She admits to having to use her mother's oxygen set at 4L of O2 but had no improvement. Around midnight, her dyspnea was so severe, as she was so winded and lightheaded to the point of not being able to walk. This prompted her to come to the ED. She also reports intermittent mid sternal chest pain that is worsened when she tries to catch her breath. The chest pain doesn't radiate. She reports right hip pain attributed to her regular sickle cell pain. Denies abdominal pain or bowel changes. She admits to pain behind her right knee which she attributes to possibly pulling a muscle from a fall a few days ago,    Of note, patient has been hospitalized 3X within the past year for sickle cell crisis. She states her crisis is usually precipitated by menorrhagia. 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 She takes hydroxyurea for maintenance therapy. She also reports a prior history of acute chest syndrome.     On arrival, /106, patient was saturating at 84% on RA improved with 2L NC, . CTA revealed b/l segmental PE and was started on heparin gtt.    Overview/Hospital Course:  pt with h/o SCD who presents to the hospital with inc shortness of breath and CP. CTA  revealed bilateral segmental PE w/o saddle embolus along with an area of consolidation concerning for inection. Treating for PE and CAP. pt also with significant R thigh pain. currently on dilaudid 2 mg q6h prn. pain managed with current medication. Pt also with chrnoic menorrhagea. states, she will follow up with gynecologist for possible placement of an IUD. educated on staying away from contraceptives that include estrogen given her recent PE.    Interval History: pt continues to c/o R hip pain despite 2 mg IV dialudid q6h. Will add additional q8h for breakthrough pain. NAEON. Remained afebrile. HR stable. Cont to be on heparin. Will transition to eliquis today.       Review of Systems   Constitutional: Negative for activity change, chills, diaphoresis and fever.   HENT: Negative for sore throat and trouble swallowing.    Respiratory: Positive for shortness of breath. Negative for apnea and chest tightness.    Cardiovascular: Negative for chest pain, palpitations and leg swelling.   Gastrointestinal: Negative for abdominal distention and abdominal pain.   Genitourinary: Negative for dysuria, flank pain, frequency and hematuria.   Musculoskeletal: Negative for arthralgias, back pain and myalgias.        R leg pain   Skin: Negative for color change, pallor and rash.   Neurological: Negative for weakness.   All other systems reviewed and are negative.    Objective:     Vital Signs (Most Recent):  Temp: 96.7 °F (35.9 °C) (06/09/20 1135)  Pulse: 83 (06/09/20 1135)  Resp: 18 (06/09/20 1135)  BP: 137/79 (06/09/20 1135)  SpO2: (!) 92 % (06/09/20 1135) Vital Signs (24h Range):  Temp:  [96.7 °F (35.9 °C)-98.3 °F (36.8 °C)] 96.7 °F (35.9 °C)  Pulse:  [] 83  Resp:  [16-20] 18  SpO2:  [92 %-96 %] 92 %  BP: (137-167)/() 137/79     Weight: (!) 138.3 kg (305 lb)  Body mass index is 55.79 kg/m².  No intake or output data in the 24 hours ending 06/09/20 1316   Physical Exam   Constitutional: She is oriented to person,  place, and time. She appears well-developed and well-nourished. No distress.   HENT:   Head: Normocephalic and atraumatic.   Eyes: Conjunctivae are normal. No scleral icterus.   Neck: Normal range of motion. Neck supple. No JVD present. No tracheal deviation present.   Cardiovascular: Normal rate, regular rhythm and intact distal pulses. Exam reveals no gallop and no friction rub.   Pulmonary/Chest: Effort normal and breath sounds normal. No respiratory distress. She has no wheezes.   Decreased breath sounds with occasional wheezes.  On 2L NC   Abdominal: Soft. Bowel sounds are normal. She exhibits no distension and no mass.   Musculoskeletal: Normal range of motion. She exhibits no edema or deformity.   Lymphadenopathy:     She has no cervical adenopathy.   Neurological: She is alert and oriented to person, place, and time.   Skin: Skin is warm and dry. She is not diaphoretic.   Psychiatric: She has a normal mood and affect.   Nursing note and vitals reviewed.      Significant Labs: All pertinent labs within the past 24 hours have been reviewed.    Significant Imaging: I have reviewed and interpreted all pertinent imaging results/findings within the past 24 hours.      Assessment/Plan:      * Pulmonary embolism  Patient with SCD, asthma presenting with acute dyspnea worsened by exertion and chest pain, noted to be hypoxic and tachycardic on presentation. ECG revealed sinus tachycardia without ischemic changes or evidence of right heart strain  On 2L of oxygen via NC   CTA chest revealed bilateral segmental PE w/o saddle embolus. No evidence of right heart strain  Initiated on heparin gtt in the ED    Plan  -will transition to Eliquis from heparin gtt  -Continue oxygen supplementation- wean off as tolerated  -Encourage incentive spirometry  -DuoNebs TID- patient reports nebs helps her breathing          CAP (community acquired pneumonia)  PT with productive cough, and SOB in setting of PE. CTA with PE however also  noted Patchy ground-glass opacities within the bilateral lower lung zones as well as more confluent consolidation within the right lower lobe consistent with pneumonia    Plan:  -- Rocephin and azithromycin  -- wean off o2 as tolerated  -- cont with incentive spirometry      Intermittent asthma without complication  Continue with ICS/LABA and Duonebs PRN       Trigeminal neuralgia  Continue carbamazepine       Essential hypertension  Continue home coreg 25mg BID, HCTZ increased to 50 mg from 25mg qD and amlodipine 10mg        Sickle-cell disease with pain  Patient with SCD presenting with acute SOB, chest pain and fever noted to have bilateral PE.   Also concern for possible acute chest syndrome  Pain regimen includes gabapentin and perocet 10mg q4hrs.   Reticulocytes 5.7. Hgb stable at 12.1.     -Continue home hydroxyurea and folic acid   -will d/c fluids  -Pain control with dilaudid 2mg q6h prn  -Encourage incentive spirometry           VTE Risk Mitigation (From admission, onward)         Ordered     heparin 25,000 units in dextrose 5% 250 mL (100 units/mL) infusion HIGH INTENSITY nomogram - [facility]  Continuous     Question:  Heparin Infusion Adjustment (DO NOT MODIFY ANSWER)  Answer:  \\[email or web address]\epic\Images\Pharmacy\HeparinInfusions\heparin HIGH INTENSITY nomogram for [facility] [id number]    06/08/20 0511     heparin 25,000 units in dextrose 5% (100 units/ml) IV bolus from bag - ADDITIONAL PRN BOLUS - 60 units/kg  As needed (PRN)     Question:  Heparin Infusion Adjustment (DO NOT MODIFY ANSWER)  Answer:  \[email or web address]\epic\Images\Pharmacy\HeparinInfusions\heparin HIGH INTENSITY nomogram for [facility] [id number]    06/08/20 0511     heparin 25,000 units in dextrose 5% (100 units/ml) IV bolus from bag - ADDITIONAL PRN BOLUS - 30 units/kg  As needed (PRN)     Question:  Heparin Infusion Adjustment (DO NOT MODIFY ANSWER)  Answer:  \\[email or web address]\epic\Images\Pharmacy\HeparinInfusions\heparin HIGH INTENSITY nomogram for [facility]  [id number]    06/08/20 0511     Reason for No Pharmacological VTE Prophylaxis  Once     Question:  Reasons:  Answer:  Already adequately anticoagulated on oral Anticoagulants    06/08/20 0556     IP VTE HIGH RISK PATIENT  Once      06/08/20 0556                      Jessica Arellano MD  Department of Hospital Medicine   [facility]

## 2020-06-09 NOTE — ASSESSMENT & PLAN NOTE
PT with productive cough, and SOB in setting of PE. CTA with PE however also noted Patchy ground-glass opacities within the bilateral lower lung zones as well as more confluent consolidation within the right lower lobe consistent with pneumonia    Plan:  -- Rocephin and azithromycin  -- wean off o2 as tolerated  -- cont with incentive spirometry

## 2020-06-09 NOTE — ASSESSMENT & PLAN NOTE
Patient with SCD presenting with acute SOB, chest pain and fever noted to have bilateral PE.   Also concern for possible acute chest syndrome  Pain regimen includes gabapentin and perocet 10mg q4hrs.   Reticulocytes 5.7. Hgb stable at 12.1.     -Continue home hydroxyurea and folic acid   -will d/c fluids  -Pain control with dilaudid 2mg q6h prn  -Encourage incentive spirometry

## 2020-06-10 LAB
ALBUMIN SERPL BCP-MCNC: 3.2 G/DL (ref 3.5–5.2)
ALP SERPL-CCNC: 64 U/L (ref 55–135)
ALT SERPL W/O P-5'-P-CCNC: 8 U/L (ref 10–44)
ANION GAP SERPL CALC-SCNC: 9 MMOL/L (ref 8–16)
AST SERPL-CCNC: 14 U/L (ref 10–40)
BASOPHILS # BLD AUTO: 0.04 K/UL (ref 0–0.2)
BASOPHILS NFR BLD: 0.5 % (ref 0–1.9)
BILIRUB SERPL-MCNC: 0.5 MG/DL (ref 0.1–1)
BUN SERPL-MCNC: 9 MG/DL (ref 6–20)
CALCIUM SERPL-MCNC: 8.6 MG/DL (ref 8.7–10.5)
CHLORIDE SERPL-SCNC: 101 MMOL/L (ref 95–110)
CO2 SERPL-SCNC: 29 MMOL/L (ref 23–29)
CREAT SERPL-MCNC: 0.8 MG/DL (ref 0.5–1.4)
DIFFERENTIAL METHOD: ABNORMAL
EOSINOPHIL # BLD AUTO: 0.2 K/UL (ref 0–0.5)
EOSINOPHIL NFR BLD: 2.8 % (ref 0–8)
ERYTHROCYTE [DISTWIDTH] IN BLOOD BY AUTOMATED COUNT: 18.2 % (ref 11.5–14.5)
EST. GFR  (AFRICAN AMERICAN): >60 ML/MIN/1.73 M^2
EST. GFR  (NON AFRICAN AMERICAN): >60 ML/MIN/1.73 M^2
GLUCOSE SERPL-MCNC: 97 MG/DL (ref 70–110)
HCT VFR BLD AUTO: 31.1 % (ref 37–48.5)
HGB BLD-MCNC: 10.5 G/DL (ref 12–16)
IMM GRANULOCYTES # BLD AUTO: 0.02 K/UL (ref 0–0.04)
IMM GRANULOCYTES NFR BLD AUTO: 0.3 % (ref 0–0.5)
LYMPHOCYTES # BLD AUTO: 2.9 K/UL (ref 1–4.8)
LYMPHOCYTES NFR BLD: 36.8 % (ref 18–48)
MAGNESIUM SERPL-MCNC: 2.2 MG/DL (ref 1.6–2.6)
MCH RBC QN AUTO: 30.6 PG (ref 27–31)
MCHC RBC AUTO-ENTMCNC: 33.8 G/DL (ref 32–36)
MCV RBC AUTO: 91 FL (ref 82–98)
MONOCYTES # BLD AUTO: 0.8 K/UL (ref 0.3–1)
MONOCYTES NFR BLD: 9.7 % (ref 4–15)
NEUTROPHILS # BLD AUTO: 4 K/UL (ref 1.8–7.7)
NEUTROPHILS NFR BLD: 49.9 % (ref 38–73)
NRBC BLD-RTO: 30 /100 WBC
PHOSPHATE SERPL-MCNC: 3.8 MG/DL (ref 2.7–4.5)
PLATELET # BLD AUTO: 158 K/UL (ref 150–350)
PMV BLD AUTO: 10.7 FL (ref 9.2–12.9)
POTASSIUM SERPL-SCNC: 3.4 MMOL/L (ref 3.5–5.1)
PROT SERPL-MCNC: 7.2 G/DL (ref 6–8.4)
RBC # BLD AUTO: 3.43 M/UL (ref 4–5.4)
SODIUM SERPL-SCNC: 139 MMOL/L (ref 136–145)
WBC # BLD AUTO: 7.97 K/UL (ref 3.9–12.7)

## 2020-06-10 PROCEDURE — 97116 GAIT TRAINING THERAPY: CPT

## 2020-06-10 PROCEDURE — 83735 ASSAY OF MAGNESIUM: CPT

## 2020-06-10 PROCEDURE — 63600175 PHARM REV CODE 636 W HCPCS: Performed by: STUDENT IN AN ORGANIZED HEALTH CARE EDUCATION/TRAINING PROGRAM

## 2020-06-10 PROCEDURE — 94640 AIRWAY INHALATION TREATMENT: CPT

## 2020-06-10 PROCEDURE — 11000001 HC ACUTE MED/SURG PRIVATE ROOM

## 2020-06-10 PROCEDURE — 25000003 PHARM REV CODE 250: Performed by: STUDENT IN AN ORGANIZED HEALTH CARE EDUCATION/TRAINING PROGRAM

## 2020-06-10 PROCEDURE — 84100 ASSAY OF PHOSPHORUS: CPT

## 2020-06-10 PROCEDURE — 36415 COLL VENOUS BLD VENIPUNCTURE: CPT

## 2020-06-10 PROCEDURE — 99232 SBSQ HOSP IP/OBS MODERATE 35: CPT | Mod: ,,, | Performed by: HOSPITALIST

## 2020-06-10 PROCEDURE — 27000221 HC OXYGEN, UP TO 24 HOURS

## 2020-06-10 PROCEDURE — 99232 PR SUBSEQUENT HOSPITAL CARE,LEVL II: ICD-10-PCS | Mod: ,,, | Performed by: HOSPITALIST

## 2020-06-10 PROCEDURE — 94761 N-INVAS EAR/PLS OXIMETRY MLT: CPT

## 2020-06-10 PROCEDURE — 94799 UNLISTED PULMONARY SVC/PX: CPT

## 2020-06-10 PROCEDURE — 97165 OT EVAL LOW COMPLEX 30 MIN: CPT

## 2020-06-10 PROCEDURE — 99900035 HC TECH TIME PER 15 MIN (STAT)

## 2020-06-10 PROCEDURE — 63700000 PHARM REV CODE 250 ALT 637 W/O HCPCS: Performed by: STUDENT IN AN ORGANIZED HEALTH CARE EDUCATION/TRAINING PROGRAM

## 2020-06-10 PROCEDURE — 25000242 PHARM REV CODE 250 ALT 637 W/ HCPCS: Performed by: HOSPITALIST

## 2020-06-10 PROCEDURE — S5010 5% DEXTROSE AND 0.45% SALINE: HCPCS | Performed by: STUDENT IN AN ORGANIZED HEALTH CARE EDUCATION/TRAINING PROGRAM

## 2020-06-10 PROCEDURE — 85025 COMPLETE CBC W/AUTO DIFF WBC: CPT

## 2020-06-10 PROCEDURE — 97161 PT EVAL LOW COMPLEX 20 MIN: CPT

## 2020-06-10 PROCEDURE — 80053 COMPREHEN METABOLIC PANEL: CPT

## 2020-06-10 RX ORDER — CAPSAICIN 0.75 MG/G
CREAM TOPICAL 3 TIMES DAILY
Status: DISCONTINUED | OUTPATIENT
Start: 2020-06-10 | End: 2020-06-11 | Stop reason: HOSPADM

## 2020-06-10 RX ORDER — HYDROMORPHONE HYDROCHLORIDE 1 MG/ML
2 INJECTION, SOLUTION INTRAMUSCULAR; INTRAVENOUS; SUBCUTANEOUS EVERY 8 HOURS PRN
Status: DISCONTINUED | OUTPATIENT
Start: 2020-06-10 | End: 2020-06-10

## 2020-06-10 RX ORDER — HYDROMORPHONE HYDROCHLORIDE 1 MG/ML
2 INJECTION, SOLUTION INTRAMUSCULAR; INTRAVENOUS; SUBCUTANEOUS EVERY 8 HOURS PRN
Status: COMPLETED | OUTPATIENT
Start: 2020-06-10 | End: 2020-06-11

## 2020-06-10 RX ORDER — DEXTROSE MONOHYDRATE AND SODIUM CHLORIDE 5; .45 G/100ML; G/100ML
INJECTION, SOLUTION INTRAVENOUS CONTINUOUS
Status: ACTIVE | OUTPATIENT
Start: 2020-06-10 | End: 2020-06-10

## 2020-06-10 RX ADMIN — CEFTRIAXONE SODIUM 1 G: 1 INJECTION, POWDER, FOR SOLUTION INTRAMUSCULAR; INTRAVENOUS at 10:06

## 2020-06-10 RX ADMIN — CARVEDILOL 25 MG: 25 TABLET, FILM COATED ORAL at 09:06

## 2020-06-10 RX ADMIN — IPRATROPIUM BROMIDE AND ALBUTEROL SULFATE 3 ML: .5; 3 SOLUTION RESPIRATORY (INHALATION) at 07:06

## 2020-06-10 RX ADMIN — CARVEDILOL 25 MG: 25 TABLET, FILM COATED ORAL at 08:06

## 2020-06-10 RX ADMIN — HYDROXYUREA 500 MG: 500 CAPSULE ORAL at 08:06

## 2020-06-10 RX ADMIN — OXYCODONE AND ACETAMINOPHEN 1 TABLET: 10; 325 TABLET ORAL at 10:06

## 2020-06-10 RX ADMIN — CARBAMAZEPINE 400 MG: 200 TABLET ORAL at 08:06

## 2020-06-10 RX ADMIN — GABAPENTIN 800 MG: 400 CAPSULE ORAL at 09:06

## 2020-06-10 RX ADMIN — DEXTROSE AND SODIUM CHLORIDE: 5; .45 INJECTION, SOLUTION INTRAVENOUS at 08:06

## 2020-06-10 RX ADMIN — AMLODIPINE BESYLATE 10 MG: 10 TABLET ORAL at 08:06

## 2020-06-10 RX ADMIN — FLUOXETINE 40 MG: 20 CAPSULE ORAL at 08:06

## 2020-06-10 RX ADMIN — HYDROXYUREA 500 MG: 500 CAPSULE ORAL at 09:06

## 2020-06-10 RX ADMIN — GABAPENTIN 800 MG: 400 CAPSULE ORAL at 08:06

## 2020-06-10 RX ADMIN — OXYCODONE AND ACETAMINOPHEN 1 TABLET: 10; 325 TABLET ORAL at 12:06

## 2020-06-10 RX ADMIN — CARBAMAZEPINE 400 MG: 200 TABLET ORAL at 09:06

## 2020-06-10 RX ADMIN — OXYCODONE AND ACETAMINOPHEN 1 TABLET: 10; 325 TABLET ORAL at 09:06

## 2020-06-10 RX ADMIN — CAPSICUM OLEORESIN: 0.75 CREAM TOPICAL at 09:06

## 2020-06-10 RX ADMIN — IPRATROPIUM BROMIDE AND ALBUTEROL SULFATE 3 ML: .5; 3 SOLUTION RESPIRATORY (INHALATION) at 01:06

## 2020-06-10 RX ADMIN — SENNOSIDES AND DOCUSATE SODIUM 1 TABLET: 8.6; 5 TABLET ORAL at 08:06

## 2020-06-10 RX ADMIN — GUAIFENESIN 600 MG: 600 TABLET, EXTENDED RELEASE ORAL at 08:06

## 2020-06-10 RX ADMIN — AZITHROMYCIN 250 MG: 250 TABLET, FILM COATED ORAL at 08:06

## 2020-06-10 RX ADMIN — APIXABAN 10 MG: 5 TABLET, FILM COATED ORAL at 09:06

## 2020-06-10 RX ADMIN — FOLIC ACID 1000 MCG: 1 TABLET ORAL at 08:06

## 2020-06-10 RX ADMIN — SENNOSIDES AND DOCUSATE SODIUM 1 TABLET: 8.6; 5 TABLET ORAL at 09:06

## 2020-06-10 RX ADMIN — HYDROMORPHONE HYDROCHLORIDE 2 MG: 1 INJECTION, SOLUTION INTRAMUSCULAR; INTRAVENOUS; SUBCUTANEOUS at 08:06

## 2020-06-10 RX ADMIN — APIXABAN 10 MG: 5 TABLET, FILM COATED ORAL at 08:06

## 2020-06-10 RX ADMIN — HYDROMORPHONE HYDROCHLORIDE 2 MG: 1 INJECTION, SOLUTION INTRAMUSCULAR; INTRAVENOUS; SUBCUTANEOUS at 11:06

## 2020-06-10 RX ADMIN — FLUTICASONE PROPIONATE 50 MCG: 50 SPRAY, METERED NASAL at 08:06

## 2020-06-10 RX ADMIN — HYDROXYZINE HYDROCHLORIDE 25 MG: 25 TABLET, FILM COATED ORAL at 11:06

## 2020-06-10 RX ADMIN — HYDROCHLOROTHIAZIDE 50 MG: 25 TABLET ORAL at 08:06

## 2020-06-10 RX ADMIN — FLUTICASONE FUROATE AND VILANTEROL TRIFENATATE 1 PUFF: 100; 25 POWDER RESPIRATORY (INHALATION) at 07:06

## 2020-06-10 RX ADMIN — GUAIFENESIN 600 MG: 600 TABLET, EXTENDED RELEASE ORAL at 09:06

## 2020-06-10 NOTE — PT/OT/SLP EVAL
Physical Therapy  Evaluation and Treatment    Nazanin Malone   [id number]    Time Tracking:     PT Received On: 06/10/20   PT Start Time: 1100   PT Stop Time: 1118   PT Total Time (min): 18 min    Billable Minutes: Evaluation 1 procedure and Gait Training 8 minutes      Recommendations:     Discharge recommendations: Home with HHPT and HHOT     Equipment recommendations: None    Barriers to Discharge: Inaccessible home environment (flight of stairs leading into home)    Patient Information:     Recent Surgery: * No surgery found *      Diagnosis: Pulmonary embolism    Length of Stay: 2 days    General Precautions: Standard, fall, respiratory  Orthopedic Precautions: None   Brace: None    Assessment:     Nazanin Malone is a 40 y.o. female admitted to [facility] on 6/8/2020 for Pulmonary embolism. Nazanin Malone tolerated evaluation well today. She had been pre-medicated for pain prior to PT/OT entry to room; states R hip pain is present but agreeable to participate. She feels more so limited by cardiorespiratory endurance rather than pain. She was able to ambulate 250 ft in hallways (wearing mask) today on 3 liters portable oxygen, stand-by assistance of therapist; moderately SOB by end of gait trial but recovered within ~1-2 minutes of resting. Has full flight of stairs leading into her home upon discharge that will need to be addressed. Discussed PT role, POC, goals and recommendations (Home with HHPT and HHOT; no DME needs) with patient; verbalized understanding. Nazanin Malone would benefit from acute PT services to promote mobility during this admission and improve return to PLOF.    Problem List: weakness, decreased endurance, impaired self-care skills, impaired mobility, decreased sitting or standing balance, gait instability, impaired cardiopulmonary response to activity, pain    Rehab Prognosis: Good; patient would benefit from acute skilled PT services to address these deficits and reach maximum level  of function.    Plan:     Patient to be seen 4 x/week to address the above listed problems via gait training, therapeutic activities, therapeutic exercises    Plan of Care Expires: 07/10/20  Plan of Care reviewed with: patient    Subjective:     Communicated with RN Corinne prior to evaluation, appropriate to see for evaluation.    Pt found supine in bed (HOB elevated) upon PT entry to room, agreeable to evaluation.    Does this patient have any cultural, spiritual, Druze conflicts given the current situation? Patient has no barriers to learning. Patient verbalizes understanding of his/her program and goals and demonstrates them correctly. No cultural, spiritual, or educational needs identified.    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 Lives in a 1  with 12 PONCE with B HR.    PLOF:  Prior to admission, patient was independent with mobility and ADL's. Does have sickle cell pain flare-ups that typically cause R hip pain. Able to drive, not currently working.    DME:  Patient owns or has access to the following DME: None    Upon discharge, patient will have assistance from family.    Objective:     Patient found with: peripheral IV, telemetry, oxygen    Pain:  Pain Rating 1: 4/10 at R hip; pre-medicated prior to session  Pain Rating Post-Intervention 1:  4/10 at R hip    Cognitive Exam:  Patient is oriented to Person, Place, Time and Situation.  Patient follows 100% of single-step commands.    Sensation:   Intact    Lower Extremity Range of Motion:  Right Lower Extremity: WFL actively  Left Lower Extremity: WFL actively    Lower Extremity Strength:  Right Lower Extremity: grossly 4/5 via MMT  Left Lower Extremity: grossly 4/5 via MMT    Functional Mobility:    · Bed Mobility:  · Supine to Sitting: Stand-by assist    · Transfers:  · Sit to Stand: Stand-by assist from EOB, BS chair with no AD x 3 trial(s) throughout session    · Gait:  · 250 feet in hallways (wearing mask) today on 3 liters portable oxygen, stand-by assistance of therapist; moderately SOB by end of gait trial but recovered within ~1-2 minutes of resting    · Assist level: Stand-By Assist  · Device: no AD    · Balance:  · Static Sit: Independent at EOB    · Static Stand: Supervision with no AD    Additional Therapeutic Activity/Exercises:     1. Discussed PT role, POC, goals and recommendations (Home with HHPT and HHOT) with patient; verbalized understanding.    2. Whiteboard was updated.    AM-PAC 6 CLICK MOBILITY  Turning over in bed (including adjusting bedclothes, sheets and blankets)?: 4  Sitting down on and standing up from a chair with arms (e.g., wheelchair, bedside commode, etc.): 4  Moving from lying on back to sitting on the side of the bed?: 4  Moving to and from a bed to a chair (including a wheelchair)?: 3  Need to walk in hospital room?: 3  Climbing 3-5 steps with a railing?: 3  Basic Mobility Total Score: 21    Patient was left sitting up in bedside chair with all lines intact and call button in reach.    Clinical Decision Making for Evaluation Complexity:  1. Body System(s) Examination: 1-2  2. Clinical Presentation: Evolving  3. Evaluation Complexity: Low    GOALS:   Multidisciplinary Problems     Physical Therapy Goals        Problem: Physical Therapy Goal    Goal Priority  Disciplines Outcome Goal Variances Interventions   Physical Therapy Goal     PT, PT/OT      Description:  Goals to be met by: 20     Patient will increase functional independence with mobility by performin. Sit to stand transfer with Supervision - Not met  2. Bed to chair transfer with Supervision - Not met  3. Gait  x 500 feet with Stand-by Assistance - Not met  4. Ascend/descend 1 flight of stairs with right Handrails with Stand-by Assistance - Not met  5. Lower extremity exercise program x 15 reps per handout, with independence - Not met                  Ritchie Davis, PT  6/10/2020

## 2020-06-10 NOTE — PLAN OF CARE
Problem: Occupational Therapy Goal  Goal: Occupational Therapy Goal  Description  Goals to be met by: 6/18/2020     Patient will increase functional independence with ADLs by performing:    UE Dressing with [place].  LE Dressing with [place].  Grooming while seated at sink with [place].  Toileting from toilet with [place] for hygiene and clothing management.   Toilet transfer to toilet with [place].     Outcome: Ongoing, Progressing    Lenny Rahman OTR/L  6/10/2020

## 2020-06-10 NOTE — PLAN OF CARE
Problem: Fall Injury Risk  Goal: Absence of Fall and Fall-Related Injury  Outcome: Ongoing, Progressing     Problem: Adult Inpatient Plan of Care  Goal: Plan of Care Review  Outcome: Ongoing, Progressing  Goal: Patient-Specific Goal (Individualization)  Outcome: Ongoing, Progressing  Goal: Absence of Hospital-Acquired Illness or Injury  Outcome: Ongoing, Progressing  Goal: Optimal Comfort and Wellbeing  Outcome: Ongoing, Progressing  Goal: Readiness for Transition of Care  Outcome: Ongoing, Progressing  Goal: Rounds/Family Conference  Outcome: Ongoing, Progressing     Problem: Bariatric Environmental Safety  Goal: Safety Maintained with Care  Outcome: Ongoing, Progressing     Problem: Infection  Goal: Infection Symptom Resolution  Outcome: Ongoing, Progressing     Problem: Fluid Imbalance (Pneumonia)  Goal: Fluid Balance  Outcome: Ongoing, Progressing     Problem: Infection (Pneumonia)  Goal: Resolution of Infection Signs/Symptoms  Outcome: Ongoing, Progressing     Problem: Respiratory Compromise (Pneumonia)  Goal: Effective Oxygenation and Ventilation  Outcome: Ongoing, Progressing     Pt AAOx3. Resting in bed. Pain under control with PRN pain meds. Fluids running. Midline dressing CDI. Call light in reach. Bed in lowest position. Will continue to monitor.

## 2020-06-10 NOTE — ASSESSMENT & PLAN NOTE
Patient with SCD, asthma presenting with acute dyspnea worsened by exertion and chest pain, noted to be hypoxic and tachycardic on presentation. ECG revealed sinus tachycardia without ischemic changes or evidence of right heart strain  On 2L of oxygen via NC   CTA chest revealed bilateral segmental PE w/o saddle embolus. No evidence of right heart strain  Initiated on heparin gtt in the ED    Plan  -on eliquis  -Continue oxygen supplementation- wean off as tolerated  -Encourage incentive spirometry  -DuoNebs TID- patient reports nebs helps her breathing

## 2020-06-10 NOTE — PHARMACY MED REC
"Admission Medication Reconciliation - Pharmacy Consult Note    The home medication history was taken by Sharon Peoples.  Based on information gathered and subsequent review by the clinical pharmacist, the items below may need attention.     You may go to "Admission" then "Reconcile Home Medications" tabs to review and/or act upon these items.     Potentially problematic discrepancies with current MAR  o Patient is taking a drug DIFFERENTLY than how ordered upon admit  o Carbamazepine 800mg BID  o HCTZ 25mg once daily      Please address this information as you see fit.  Feel free to contact us if you have any questions or require assistance.    Rigoberto Merida, PharmD  [id number]                   .    .            "

## 2020-06-10 NOTE — PT/OT/SLP EVAL
Occupational Therapy   Evaluation    Name: Nazanin Malone  MRN: [id number]  Admitting Diagnosis:  Pulmonary embolism      Recommendations:     Discharge Recommendations: home with home health  Discharge Equipment Recommendations:  none  Barriers to discharge:  None    Assessment:     Nazanin Malone is a 40 y.o. female with a medical diagnosis of Pulmonary embolism.  She presents with impairments listed below. Pt did well to participate in the session. Pt displayed limited endurance for oob activities compared to baseline. Pt displayed global deconditioning requiring increased assist for ADLs and mobility at this time. Pt would benefit from skilled OT services to improve independence and overall occupational functioning.     Performance deficits affecting function: weakness, impaired endurance, impaired self care skills, impaired functional mobilty, gait instability, impaired balance, decreased lower extremity function, impaired cardiopulmonary response to activity.      Rehab Prognosis: Good; patient would benefit from acute skilled OT services to address these deficits and reach maximum level of function.       Plan:     Patient to be seen 3 x/week to address the above listed problems via self-care/home management, therapeutic activities, therapeutic exercises  · Plan of Care Expires: 07/10/20  · Plan of Care Reviewed with: patient    Subjective     Chief Complaint: sob w/ exertion  Patient/Family Comments/goals: return home     Occupational Profile:  Living Environment: Pt lives in a [facility] w/ 15 steps to enter andHR present.   Previous level of function: Indep  Roles and Routines: N/A  Equipment Used at Home:  none  Assistance upon Discharge: Pt has assistance upon D/C.     Pain/Comfort:  · Pain Rating 1: 4/10  · Location - Side 1: Right  · Location - Orientation 1: generalized  · Location 1: hip  · Pain Addressed 1: Reposition, Pre-medicate for activity, Distraction  · Pain Rating Post-Intervention 1:  4/10    Patients cultural, spiritual, Jehovah's witness conflicts given the current situation:      Objective:     Communicated with: RN prior to session.  Patient found HOB elevated with telemetry, oxygen, peripheral IV upon OT entry to room.    General Precautions: Standard, fall, respiratory   Orthopedic Precautions:N/A   Braces: N/A     Occupational Performance:    Bed Mobility:    · Patient completed Scooting/Bridging with supervision  · Patient completed Supine to Sit with supervision  · Patient completed Sit to Supine with supervision    Functional Mobility/Transfers:  · Patient completed Sit <> Stand Transfer with stand by assistance  with  no assistive device   · Functional Mobility: Pt ambulated ~250 ft at cga to sba w/o AD on 3 L of O2. Pt noted to be sob w/ increased wob w/ prolonged exertion.     Activities of Daily Living:  · Upper Body Dressing: supervision donned gown in front and back    Cognitive/Visual Perceptual:  Cognitive/Psychosocial Skills:     -       Oriented to: Person, Place, Time and Situation   -       Follows Commands/attention:Follows multistep  commands  -       Communication: clear/fluent  -       Memory: No Deficits noted  -       Safety awareness/insight to disability: intact   -       Mood/Affect/Coping skills/emotional control: Appropriate to situation  Visual/Perceptual:      -Intact      Physical Exam:  Balance:    -       cga to sba for ambulation  Postural examination/scapula alignment:    -       Rounded shoulders  Skin integrity: Visible skin intact  Upper Extremity Range of Motion:     -       Right Upper Extremity: WFL  -       Left Upper Extremity: WFL  Upper Extremity Strength:    -       Right Upper Extremity: WFL  -       Left Upper Extremity: WFL   Strength:    -       Right Upper Extremity: WFL  -       Left Upper Extremity: WFL  Fine Motor Coordination:    -       Intact  Gross motor coordination:   WFL    AMPAC 6 Click ADL:  AMPAC Total Score: 21    Treatment &  Education:  Pt educated on POC.  Education:    Patient left HOB elevated with all lines intact and call button in reach    GOALS:   Multidisciplinary Problems     Occupational Therapy Goals        Problem: Occupational Therapy Goal    Goal Priority Disciplines Outcome Interventions   Occupational Therapy Goal     OT, PT/OT Ongoing, Progressing    Description:  Goals to be met by: 2020     Patient will increase functional independence with ADLs by performing:    UE Dressing with [place].  LE Dressing with [place].  Grooming while seated at sink with [place].  Toileting from toilet with [place] for hygiene and clothing management.   Toilet transfer to toilet with [place].                      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    Asthma     Depression     Hypertension     Morbid obesity     Opioid dependence 2017    Pneumonia due to Streptococcus pneumoniae 2017    Right lower lobe pneumonia 2017    Sepsis due to Streptococcus pneumoniae 2017    Sickle cell-beta thalassemia disease with pain     Trigeminal neuralgia        Past Surgical History:   Procedure Laterality Date     SECTION      TONSILLECTOMY      TUBAL LIGATION         Time Tracking:     OT Date of Treatment: 06/10/20  OT Start Time: 1100  OT Stop Time: 1116  OT Total Time (min): 16 min    Billable Minutes:Evaluation 16 minutes    Lenny Rahman OT  6/10/2020

## 2020-06-10 NOTE — PLAN OF CARE
Nazanin Malone is a 40 y.o. female admitted to [facility] on 2020 for Pulmonary embolism. Nazanin Malone tolerated evaluation well today. She had been pre-medicated for pain prior to PT/OT entry to room; states R hip pain is present but agreeable to participate. She feels more so limited by cardiorespiratory endurance rather than pain. She was able to ambulate 250 ft in hallways (wearing mask) today on 3 liters portable oxygen, stand-by assistance of therapist; moderately SOB by end of gait trial but recovered within ~1-2 minutes of resting. Has full flight of stairs leading into her home upon discharge that will need to be addressed. Discussed PT role, POC, goals and recommendations (Home with HHPT and HHOT; no DME needs) with patient; verbalized understanding. Nazanin Malone would benefit from acute PT services to promote mobility during this admission and improve return to PLOF.    Problem: Physical Therapy Goal  Goal: Physical Therapy Goal  Description  Goals to be met by: 20     Patient will increase functional independence with mobility by performin. Sit to stand transfer with Supervision - Not met  2. Bed to chair transfer with Supervision - Not met  3. Gait  x 500 feet with Stand-by Assistance - Not met  4. Ascend/descend 1 flight of stairs with right Handrails with Stand-by Assistance - Not met  5. Lower extremity exercise program x 15 reps per handout, with independence - Not met   Outcome: Ongoing, Progressing    Ritchie Davis, PT  6/10/2020

## 2020-06-10 NOTE — PROGRESS NOTES
[facility] Hospital Medicine  Progress Note    Patient Name: Nazanin Malone  MRN: [id number]  Patient Class: IP- Inpatient   Admission Date: 6/8/2020  Length of Stay: 2 days  Attending Physician: Selvin Stern MD  Primary Care Provider: Primary [facility] Medicine Team: [facility] 4 Raymond Aparicio MD    Subjective:     Principal Problem:Pulmonary embolism        HPI:  Nazanin Malone is a 40 year old F with SCD, HTN, asthma presenting with 5 day duration of shortness of breath. Patient states dyspnea worsened 3 days ago especially with ambulation. She also reported intermittent fevers, Tmax of 101. She admits to having to use her mother's oxygen set at 4L of O2 but had no improvement. Around midnight, her dyspnea was so severe, as she was so winded and lightheaded to the point of not being able to walk. This prompted her to come to the ED. She also reports intermittent mid sternal chest pain that is worsened when she tries to catch her breath. The chest pain doesn't radiate. She reports right hip pain attributed to her regular sickle cell pain. Denies abdominal pain or bowel changes. She admits to pain behind her right knee which she attributes to possibly pulling a muscle from a fall a few days ago,    Of note, patient has been hospitalized 3X within the past year for sickle cell crisis. She states her crisis is usually precipitated by menorrhagia. 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 She takes hydroxyurea for maintenance therapy. She also reports a prior history of acute chest syndrome.     On arrival, /106, patient was saturating at 84% on RA improved with 2L NC, . CTA revealed b/l segmental PE and was started on heparin gtt.    Overview/Hospital Course:  pt with h/o SCD who presents to the hospital with inc shortness of breath and CP. CTA  revealed bilateral segmental PE w/o saddle embolus along with an area of consolidation concerning for inection. Treating for PE and CAP. pt also with significant R thigh pain. Will transition patient to PO opioid medication over the next 1-2 days. Will also get CT of right hip to further evaluate for AVN. Continue supplemental O2 as needed with eliquis and CAP coverage.     Interval History: No acute events overnight, patient appears comfortable in the morning. Will work to transition pain medications to PO.     Review of Systems   Constitutional: Negative for activity change, chills, diaphoresis and fever.   HENT: Negative for sore throat and trouble swallowing.    Respiratory: Positive for shortness of breath. Negative for apnea and chest tightness.    Cardiovascular: Negative for chest pain, palpitations and leg swelling.   Gastrointestinal: Negative for abdominal distention and abdominal pain.   Genitourinary: Negative for dysuria, flank pain, frequency and hematuria.   Musculoskeletal: Negative for arthralgias, back pain and myalgias.        R leg pain   Skin: Negative for color change, pallor and rash.   Neurological: Negative for weakness.   All other systems reviewed and are negative.    Objective:     Vital Signs (Most Recent):  Temp: 98.3 °F (36.8 °C) (06/10/20 1508)  Pulse: 86 (06/10/20 1508)  Resp: 16 (06/10/20 1508)  BP: 127/82 (06/10/20 1508)  SpO2: (!) 94 % (06/10/20 1309) Vital Signs (24h Range):  Temp:  [96.2 °F (35.7 °C)-99.6 °F (37.6 °C)] 98.3 °F (36.8 °C)  Pulse:  [85-94] 86  Resp:  [16-22] 16  SpO2:  [89 %-96 %] 94 %  BP: (126-142)/(69-91) 127/82     Weight: (!) 138.3 kg (305 lb)  Body mass index is 55.79 kg/m².  No intake or output data in the 24 hours ending 06/10/20 1529   Physical Exam   Constitutional: She is oriented to person, place, and time. She appears well-developed and well-nourished. No distress.   obese   HENT:   Head: Normocephalic and atraumatic.   Eyes: Conjunctivae are normal.  No scleral icterus.   Neck: Normal range of motion. Neck supple. No JVD present. No tracheal deviation present.   Cardiovascular: Normal rate, regular rhythm and intact distal pulses. Exam reveals no gallop and no friction rub.   Pulmonary/Chest: Effort normal and breath sounds normal. No respiratory distress. She has no wheezes.   Decreased breath sounds, still requiring 2 L O2 NC   Abdominal: Soft. Bowel sounds are normal. She exhibits no distension and no mass.   Musculoskeletal: Normal range of motion. She exhibits no edema or deformity.   Lymphadenopathy:     She has no cervical adenopathy.   Neurological: She is alert and oriented to person, place, and time.   Skin: Skin is warm and dry. She is not diaphoretic.   Psychiatric: She has a normal mood and affect.   Nursing note and vitals reviewed.      Significant Labs: All pertinent labs within the past 24 hours have been reviewed.    Significant Imaging: I have reviewed all pertinent imaging results/findings within the past 24 hours.      Assessment/Plan:      * Pulmonary embolism  Patient with SCD, asthma presenting with acute dyspnea worsened by exertion and chest pain, noted to be hypoxic and tachycardic on presentation. ECG revealed sinus tachycardia without ischemic changes or evidence of right heart strain  On 2L of oxygen via NC   CTA chest revealed bilateral segmental PE w/o saddle embolus. No evidence of right heart strain  Initiated on heparin gtt in the ED    Plan  -on eliquis  -Continue oxygen supplementation- wean off as tolerated  -Encourage incentive spirometry  -DuoNebs TID- patient reports nebs helps her breathing          CAP (community acquired pneumonia)  PT with productive cough, and SOB in setting of PE. CTA with PE however also noted Patchy ground-glass opacities within the bilateral lower lung zones as well as more confluent consolidation within the right lower lobe consistent with pneumonia    Plan:  -- Rocephin and azithromycin  --  wean off o2 as tolerated  -- cont with incentive spirometry      Intermittent asthma without complication  Continue with ICS/LABA and Duonebs PRN       Trigeminal neuralgia  Continue carbamazepine       Essential hypertension  Continue home coreg 25mg BID, HCTZ increased to 50 mg from 25mg qD and amlodipine 10mg        Sickle-cell disease with pain  Patient with SCD presenting with acute SOB, chest pain and fever noted to have bilateral PE.   Also concern for possible acute chest syndrome  Pain regimen includes gabapentin and perocet 10mg q4hrs.   Reticulocytes 5.7. Hgb stable at 12.1.     -Continue home hydroxyurea and folic acid   -Pain control with dilaudid 2mg q8h prn for 2 doses  -Percocet q4 prn  -Encourage incentive spirometry             VTE Risk Mitigation (From admission, onward)         Ordered     apixaban tablet 10 mg  2 times daily      06/09/20 1332     Reason for No Pharmacological VTE Prophylaxis  Once     Question:  Reasons:  Answer:  Already adequately anticoagulated on oral Anticoagulants    06/08/20 0556     IP VTE HIGH RISK PATIENT  Once      06/08/20 0556                      Raymond Aparicio MD  Department of Hospital Medicine   [facility]

## 2020-06-10 NOTE — PLAN OF CARE
06/10/20 1312   Post-Acute Status   Post-Acute Authorization Placement   Post-Acute Placement Status Awaiting Internal Medical Clearance   SW following patient for post acute care needs. Patient received therapy recs for HH for post acute care. Patient with [facility] Medicaid as payor and is therefore unable to be set up with HH for PT/OT. CM to set patient up with outpatient therapy post discharge. SW will continue to follow.  Lakeisha Goss LMSW  [facility]

## 2020-06-10 NOTE — ASSESSMENT & PLAN NOTE
Patient with SCD presenting with acute SOB, chest pain and fever noted to have bilateral PE.   Also concern for possible acute chest syndrome  Pain regimen includes gabapentin and perocet 10mg q4hrs.   Reticulocytes 5.7. Hgb stable at 12.1.     -Continue home hydroxyurea and folic acid   -Pain control with dilaudid 2mg q8h prn for 2 doses  -Percocet q4 prn  -Encourage incentive spirometry

## 2020-06-10 NOTE — SUBJECTIVE & OBJECTIVE
Interval History: No acute events overnight, patient appears comfortable in the morning. Will work to transition pain medications to PO.     Review of Systems   Constitutional: Negative for activity change, chills, diaphoresis and fever.   HENT: Negative for sore throat and trouble swallowing.    Respiratory: Positive for shortness of breath. Negative for apnea and chest tightness.    Cardiovascular: Negative for chest pain, palpitations and leg swelling.   Gastrointestinal: Negative for abdominal distention and abdominal pain.   Genitourinary: Negative for dysuria, flank pain, frequency and hematuria.   Musculoskeletal: Negative for arthralgias, back pain and myalgias.        R leg pain   Skin: Negative for color change, pallor and rash.   Neurological: Negative for weakness.   All other systems reviewed and are negative.    Objective:     Vital Signs (Most Recent):  Temp: 98.3 °F (36.8 °C) (06/10/20 1508)  Pulse: 86 (06/10/20 1508)  Resp: 16 (06/10/20 1508)  BP: 127/82 (06/10/20 1508)  SpO2: (!) 94 % (06/10/20 1309) Vital Signs (24h Range):  Temp:  [96.2 °F (35.7 °C)-99.6 °F (37.6 °C)] 98.3 °F (36.8 °C)  Pulse:  [85-94] 86  Resp:  [16-22] 16  SpO2:  [89 %-96 %] 94 %  BP: (126-142)/(69-91) 127/82     Weight: (!) 138.3 kg (305 lb)  Body mass index is 55.79 kg/m².  No intake or output data in the 24 hours ending 06/10/20 1529   Physical Exam   Constitutional: She is oriented to person, place, and time. She appears well-developed and well-nourished. No distress.   obese   HENT:   Head: Normocephalic and atraumatic.   Eyes: Conjunctivae are normal. No scleral icterus.   Neck: Normal range of motion. Neck supple. No JVD present. No tracheal deviation present.   Cardiovascular: Normal rate, regular rhythm and intact distal pulses. Exam reveals no gallop and no friction rub.   Pulmonary/Chest: Effort normal and breath sounds normal. No respiratory distress. She has no wheezes.   Decreased breath sounds, still requiring 2  L O2 NC   Abdominal: Soft. Bowel sounds are normal. She exhibits no distension and no mass.   Musculoskeletal: Normal range of motion. She exhibits no edema or deformity.   Lymphadenopathy:     She has no cervical adenopathy.   Neurological: She is alert and oriented to person, place, and time.   Skin: Skin is warm and dry. She is not diaphoretic.   Psychiatric: She has a normal mood and affect.   Nursing note and vitals reviewed.      Significant Labs: All pertinent labs within the past 24 hours have been reviewed.    Significant Imaging: I have reviewed all pertinent imaging results/findings within the past 24 hours.

## 2020-06-11 VITALS
SYSTOLIC BLOOD PRESSURE: 125 MMHG | BODY MASS INDEX: 53.92 KG/M2 | HEIGHT: 62 IN | TEMPERATURE: 97 F | RESPIRATION RATE: 18 BRPM | HEART RATE: 83 BPM | DIASTOLIC BLOOD PRESSURE: 78 MMHG | WEIGHT: 293 LBS | OXYGEN SATURATION: 95 %

## 2020-06-11 LAB
ALBUMIN SERPL BCP-MCNC: 3.2 G/DL (ref 3.5–5.2)
ALP SERPL-CCNC: 70 U/L (ref 55–135)
ALT SERPL W/O P-5'-P-CCNC: 7 U/L (ref 10–44)
ANION GAP SERPL CALC-SCNC: 7 MMOL/L (ref 8–16)
AST SERPL-CCNC: 15 U/L (ref 10–40)
BASOPHILS # BLD AUTO: 0.04 K/UL (ref 0–0.2)
BASOPHILS NFR BLD: 0.5 % (ref 0–1.9)
BILIRUB SERPL-MCNC: 0.4 MG/DL (ref 0.1–1)
BUN SERPL-MCNC: 8 MG/DL (ref 6–20)
CALCIUM SERPL-MCNC: 8.8 MG/DL (ref 8.7–10.5)
CHLORIDE SERPL-SCNC: 102 MMOL/L (ref 95–110)
CO2 SERPL-SCNC: 30 MMOL/L (ref 23–29)
CREAT SERPL-MCNC: 0.8 MG/DL (ref 0.5–1.4)
DIFFERENTIAL METHOD: ABNORMAL
EOSINOPHIL # BLD AUTO: 0.4 K/UL (ref 0–0.5)
EOSINOPHIL NFR BLD: 4.1 % (ref 0–8)
ERYTHROCYTE [DISTWIDTH] IN BLOOD BY AUTOMATED COUNT: 17.6 % (ref 11.5–14.5)
EST. GFR  (AFRICAN AMERICAN): >60 ML/MIN/1.73 M^2
EST. GFR  (NON AFRICAN AMERICAN): >60 ML/MIN/1.73 M^2
GLUCOSE SERPL-MCNC: 81 MG/DL (ref 70–110)
HCT VFR BLD AUTO: 29.8 % (ref 37–48.5)
HGB BLD-MCNC: 10 G/DL (ref 12–16)
IMM GRANULOCYTES # BLD AUTO: 0.03 K/UL (ref 0–0.04)
IMM GRANULOCYTES NFR BLD AUTO: 0.4 % (ref 0–0.5)
LYMPHOCYTES # BLD AUTO: 3.8 K/UL (ref 1–4.8)
LYMPHOCYTES NFR BLD: 44.1 % (ref 18–48)
MAGNESIUM SERPL-MCNC: 2.2 MG/DL (ref 1.6–2.6)
MCH RBC QN AUTO: 30.3 PG (ref 27–31)
MCHC RBC AUTO-ENTMCNC: 33.6 G/DL (ref 32–36)
MCV RBC AUTO: 90 FL (ref 82–98)
MONOCYTES # BLD AUTO: 1 K/UL (ref 0.3–1)
MONOCYTES NFR BLD: 11.9 % (ref 4–15)
NEUTROPHILS # BLD AUTO: 3.3 K/UL (ref 1.8–7.7)
NEUTROPHILS NFR BLD: 39 % (ref 38–73)
NRBC BLD-RTO: 12 /100 WBC
PHOSPHATE SERPL-MCNC: 3.3 MG/DL (ref 2.7–4.5)
PLATELET # BLD AUTO: 202 K/UL (ref 150–350)
PMV BLD AUTO: 11.7 FL (ref 9.2–12.9)
POTASSIUM SERPL-SCNC: 3.7 MMOL/L (ref 3.5–5.1)
PROT SERPL-MCNC: 7.2 G/DL (ref 6–8.4)
RBC # BLD AUTO: 3.3 M/UL (ref 4–5.4)
SODIUM SERPL-SCNC: 139 MMOL/L (ref 136–145)
WBC # BLD AUTO: 8.52 K/UL (ref 3.9–12.7)

## 2020-06-11 PROCEDURE — 25000003 PHARM REV CODE 250: Performed by: STUDENT IN AN ORGANIZED HEALTH CARE EDUCATION/TRAINING PROGRAM

## 2020-06-11 PROCEDURE — 25000242 PHARM REV CODE 250 ALT 637 W/ HCPCS: Performed by: HOSPITALIST

## 2020-06-11 PROCEDURE — 63600175 PHARM REV CODE 636 W HCPCS: Performed by: STUDENT IN AN ORGANIZED HEALTH CARE EDUCATION/TRAINING PROGRAM

## 2020-06-11 PROCEDURE — 80053 COMPREHEN METABOLIC PANEL: CPT

## 2020-06-11 PROCEDURE — 83735 ASSAY OF MAGNESIUM: CPT

## 2020-06-11 PROCEDURE — 94640 AIRWAY INHALATION TREATMENT: CPT

## 2020-06-11 PROCEDURE — 27000221 HC OXYGEN, UP TO 24 HOURS

## 2020-06-11 PROCEDURE — 99238 HOSP IP/OBS DSCHRG MGMT 30/<: CPT | Mod: ,,, | Performed by: HOSPITALIST

## 2020-06-11 PROCEDURE — 99238 PR HOSPITAL DISCHARGE DAY,<30 MIN: ICD-10-PCS | Mod: ,,, | Performed by: HOSPITALIST

## 2020-06-11 PROCEDURE — 94761 N-INVAS EAR/PLS OXIMETRY MLT: CPT

## 2020-06-11 PROCEDURE — 85025 COMPLETE CBC W/AUTO DIFF WBC: CPT

## 2020-06-11 PROCEDURE — 84100 ASSAY OF PHOSPHORUS: CPT

## 2020-06-11 PROCEDURE — 36415 COLL VENOUS BLD VENIPUNCTURE: CPT

## 2020-06-11 PROCEDURE — 63700000 PHARM REV CODE 250 ALT 637 W/O HCPCS: Performed by: STUDENT IN AN ORGANIZED HEALTH CARE EDUCATION/TRAINING PROGRAM

## 2020-06-11 RX ORDER — HYDROMORPHONE HYDROCHLORIDE 1 MG/ML
2 INJECTION, SOLUTION INTRAMUSCULAR; INTRAVENOUS; SUBCUTANEOUS EVERY 6 HOURS PRN
Status: DISCONTINUED | OUTPATIENT
Start: 2020-06-11 | End: 2020-06-11 | Stop reason: HOSPADM

## 2020-06-11 RX ORDER — LEVOFLOXACIN 500 MG/1
500 TABLET, FILM COATED ORAL DAILY
Qty: 2 TABLET | Refills: 0 | Status: SHIPPED | OUTPATIENT
Start: 2020-06-11 | End: 2020-06-13

## 2020-06-11 RX ORDER — IPRATROPIUM BROMIDE AND ALBUTEROL SULFATE 2.5; .5 MG/3ML; MG/3ML
3 SOLUTION RESPIRATORY (INHALATION) EVERY 6 HOURS PRN
Qty: 360 ML | Refills: 2 | Status: SHIPPED | OUTPATIENT
Start: 2020-06-11 | End: 2021-10-26 | Stop reason: SDUPTHER

## 2020-06-11 RX ORDER — HYDROMORPHONE HYDROCHLORIDE 1 MG/ML
1 INJECTION, SOLUTION INTRAMUSCULAR; INTRAVENOUS; SUBCUTANEOUS ONCE
Status: COMPLETED | OUTPATIENT
Start: 2020-06-11 | End: 2020-06-11

## 2020-06-11 RX ADMIN — CAPSICUM OLEORESIN: 0.75 CREAM TOPICAL at 11:06

## 2020-06-11 RX ADMIN — FOLIC ACID 1000 MCG: 1 TABLET ORAL at 08:06

## 2020-06-11 RX ADMIN — OXYCODONE AND ACETAMINOPHEN 1 TABLET: 10; 325 TABLET ORAL at 01:06

## 2020-06-11 RX ADMIN — HYDROCHLOROTHIAZIDE 50 MG: 25 TABLET ORAL at 08:06

## 2020-06-11 RX ADMIN — FLUOXETINE 40 MG: 20 CAPSULE ORAL at 09:06

## 2020-06-11 RX ADMIN — FLUTICASONE FUROATE AND VILANTEROL TRIFENATATE 1 PUFF: 100; 25 POWDER RESPIRATORY (INHALATION) at 06:06

## 2020-06-11 RX ADMIN — HYDROMORPHONE HYDROCHLORIDE 2 MG: 1 INJECTION, SOLUTION INTRAMUSCULAR; INTRAVENOUS; SUBCUTANEOUS at 03:06

## 2020-06-11 RX ADMIN — HYDROXYZINE HYDROCHLORIDE 25 MG: 25 TABLET, FILM COATED ORAL at 04:06

## 2020-06-11 RX ADMIN — HYDROMORPHONE HYDROCHLORIDE 1 MG: 1 INJECTION, SOLUTION INTRAMUSCULAR; INTRAVENOUS; SUBCUTANEOUS at 04:06

## 2020-06-11 RX ADMIN — OXYCODONE AND ACETAMINOPHEN 1 TABLET: 10; 325 TABLET ORAL at 12:06

## 2020-06-11 RX ADMIN — HYDROMORPHONE HYDROCHLORIDE 1 MG: 1 INJECTION, SOLUTION INTRAMUSCULAR; INTRAVENOUS; SUBCUTANEOUS at 08:06

## 2020-06-11 RX ADMIN — FLUTICASONE PROPIONATE 50 MCG: 50 SPRAY, METERED NASAL at 11:06

## 2020-06-11 RX ADMIN — GUAIFENESIN 600 MG: 600 TABLET, EXTENDED RELEASE ORAL at 08:06

## 2020-06-11 RX ADMIN — OXYCODONE AND ACETAMINOPHEN 1 TABLET: 10; 325 TABLET ORAL at 05:06

## 2020-06-11 RX ADMIN — CARVEDILOL 25 MG: 25 TABLET, FILM COATED ORAL at 08:06

## 2020-06-11 RX ADMIN — CEFTRIAXONE 2 G: 2 INJECTION, SOLUTION INTRAVENOUS at 11:06

## 2020-06-11 RX ADMIN — SENNOSIDES AND DOCUSATE SODIUM 1 TABLET: 8.6; 5 TABLET ORAL at 08:06

## 2020-06-11 RX ADMIN — AMLODIPINE BESYLATE 10 MG: 10 TABLET ORAL at 09:06

## 2020-06-11 RX ADMIN — GABAPENTIN 800 MG: 400 CAPSULE ORAL at 08:06

## 2020-06-11 RX ADMIN — IPRATROPIUM BROMIDE AND ALBUTEROL SULFATE 3 ML: .5; 3 SOLUTION RESPIRATORY (INHALATION) at 06:06

## 2020-06-11 RX ADMIN — CARBAMAZEPINE 400 MG: 200 TABLET ORAL at 11:06

## 2020-06-11 RX ADMIN — HYDROXYUREA 500 MG: 500 CAPSULE ORAL at 11:06

## 2020-06-11 RX ADMIN — AZITHROMYCIN 250 MG: 250 TABLET, FILM COATED ORAL at 08:06

## 2020-06-11 RX ADMIN — HYDROMORPHONE HYDROCHLORIDE 1 MG: 1 INJECTION, SOLUTION INTRAMUSCULAR; INTRAVENOUS; SUBCUTANEOUS at 07:06

## 2020-06-11 RX ADMIN — APIXABAN 10 MG: 5 TABLET, FILM COATED ORAL at 08:06

## 2020-06-11 NOTE — DISCHARGE SUMMARY
[facility] Hospital Medicine  Discharge Summary      Patient Name: Nazanin Malone  MRN: [id number]  Admission Date: 6/8/2020  Hospital Length of Stay: 3 days  Discharge Date and Time:  06/11/2020 12:46 PM  Attending Physician: Samira Dye MD   Discharging Provider: Jessica Arellano MD  Primary Care Provider: Primary Doctor [facility] Medicine Team: [facility] HOSP MED 4 Jessica Arellano MD    HPI:   Nazanin Malone is a 40 year old F with SCD, HTN, asthma presenting with 5 day duration of shortness of breath. Patient states dyspnea worsened 3 days ago especially with ambulation. She also reported intermittent fevers, Tmax of 101. She admits to having to use her mother's oxygen set at 4L of O2 but had no improvement. Around midnight, her dyspnea was so severe, as she was so winded and lightheaded to the point of not being able to walk. This prompted her to come to the ED. She also reports intermittent mid sternal chest pain that is worsened when she tries to catch her breath. The chest pain doesn't radiate. She reports right hip pain attributed to her regular sickle cell pain. Denies abdominal pain or bowel changes. She admits to pain behind her right knee which she attributes to possibly pulling a muscle from a fall a few days ago,    Of note, patient has been hospitalized 3X within the past year for sickle cell crisis. She states her crisis is usually precipitated by menorrhagia. 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 She takes hydroxyurea for maintenance therapy. She also reports a prior history of acute chest syndrome.     On arrival, /106, patient was saturating at 84% on RA improved with 2L NC, . CTA revealed b/l segmental PE and was started on heparin gtt.    * No surgery found *      Hospital Course:   pt with h/o SCD who presents to the hospital with inc  shortness of breath and CP. CTA revealed bilateral segmental PE w/o saddle embolus along with an area of consolidation concerning for inection. Treating for PE and CAP. pt also with significant R thigh pain. Will transition patient to PO opioid medication over the next 1-2 days. Will also get CT of right hip negative for R hip necrosis. Pt pain managed. No longer on supplemental oxygen. Will discharge home with outpatient follow up with PCP for better management of her uncontrolled asthma; hematologist for sickle cell disease, along with OBGYN for estrogen free contraceptive. Will be on Eliquis indefinitely  Vitals:    06/11/20 1144   BP: 125/78   Pulse: 83   Resp: 18   Temp: 97 °F (36.1 °C)     Physical Exam   Constitutional: She is oriented to person, place, and time. She appears well-developed and well-nourished. No distress.   obese   HENT:   Head: Normocephalic and atraumatic.   Eyes: Conjunctivae are normal. No scleral icterus.   Neck: Normal range of motion. Neck supple. No JVD present. No tracheal deviation present.   Cardiovascular: Normal rate, regular rhythm and intact distal pulses. Exam reveals no gallop and no friction rub.   Pulmonary/Chest: Effort normal and breath sounds normal. No respiratory distress. She has no wheezes.   Abdominal: Soft. Bowel sounds are normal. She exhibits no distension and no mass.   Musculoskeletal: Normal range of motion. She exhibits no edema or deformity.   Lymphadenopathy:     She has no cervical adenopathy.   Neurological: She is alert and oriented to person, place, and time.   Skin: Skin is warm and dry. She is not diaphoretic.   Psychiatric: She has a normal mood and affect.   Nursing note and vitals reviewed.     Consults:   Consults (From admission, onward)        Status Ordering Provider     Inpatient consult to Midline team  Once     Provider:  (Not yet assigned)    Completed OKSANA REBOLLEDO     Inpatient consult to Midline team  Once     Provider:  (Not yet  assigned)    Completed OKSANA REBOLLEDO new Assessment & Plan notes have been filed under this hospital service since the last note was generated.  Service: Hospital Medicine    Final Active Diagnoses:    Diagnosis Date Noted POA    PRINCIPAL PROBLEM:  Pulmonary embolism [I26.99] 06/08/2020 Yes    CAP (community acquired pneumonia) [J18.9] 06/09/2020 Unknown    Morbid obesity [E66.01] 06/08/2020 Yes    Hypokalemia [E87.6] 06/08/2020 Yes    Acute hypoxemic respiratory failure [J96.01] 06/08/2020 Yes    Intermittent asthma without complication [J45.20] 01/10/2020 Yes    Iron deficiency anemia due to chronic blood loss [D50.0] 12/27/2019 Yes    Trigeminal neuralgia [G50.0] 03/20/2018 Yes    Sickle-cell disease with pain [D57.00] 04/16/2017 Yes    Essential hypertension [I10] 04/16/2017 Yes      Problems Resolved During this Admission:       Discharged Condition: good    Disposition: Home or Self Care    Follow Up:  Follow-up Information     Primary Doctor No In 1 week.    Why:  management of uncontrolled asthma           Gynecologist In 1 week.    Why:  for management of heavy bleeding and placement on non estrogen contraceptive.               Patient Instructions:   No discharge procedures on file.    Significant Diagnostic Studies:     Pending Diagnostic Studies:     None         Medications:  Reconciled Home Medications:      Medication List      START taking these medications    ELIQUIS 5 mg Tab  Generic drug:  apixaban  Take 2 tablets (10 mg total) by mouth 2 (two) times daily until 6/15/2020.   Afterward, take 1 tablet (5 mg) twice daily indifinitely.     levoFLOXacin 500 MG tablet  Commonly known as:  LEVAQUIN  Take 1 tablet (500 mg total) by mouth once daily. for 2 days        CHANGE how you take these medications    senna-docusate 8.6-50 mg 8.6-50 mg per tablet  Commonly known as:  PERICOLACE  Take 1 tablet by mouth 2 (two) times daily as needed for Constipation.  What changed:  when to  take this        CONTINUE taking these medications    albuterol 90 mcg/actuation inhaler  Commonly known as:  PROVENTIL/VENTOLIN HFA  Inhale 2 puffs into the lungs every 6 (six) hours as needed for Wheezing or Shortness of Breath. Rescue     albuterol-ipratropium 2.5 mg-0.5 mg/3 mL nebulizer solution  Commonly known as:  DUO-NEB  Take 3 mLs by nebulization every 6 (six) hours as needed for Wheezing or Shortness of Breath. Rescue     amLODIPine 10 MG tablet  Commonly known as:  NORVASC  TAKE 1 TABLET BY MOUTH EVERY DAY     carBAMazepine 200 mg tablet  Commonly known as:  TEGRETOL  Take 4 tablets (800 mg total) by mouth 2 (two) times daily.     carvediloL 25 MG tablet  Commonly known as:  COREG  Take 1 tablet (25 mg total) by mouth 2 (two) times daily.     cyclobenzaprine 10 MG tablet  Commonly known as:  FLEXERIL  Take 10 mg by mouth 3 (three) times daily as needed for Muscle spasms.     FLUoxetine 40 MG capsule  Take 1 capsule (40 mg total) by mouth once daily.     fluticasone propionate 50 mcg/actuation nasal spray  Commonly known as:  FLONASE  1 SPRAY (50 MCG TOTAL) BY EACH NOSTRIL ROUTE ONCE DAILY.     fluticasone-salmeterol 100-50 mcg/dose 100-50 mcg/dose diskus inhaler  Commonly known as:  ADVAIR  Inhale 1 puff into the lungs once daily. Controller     folic acid 400 MCG tablet  Commonly known as:  FOLVITE  Take 400 mcg by mouth once daily.     gabapentin 400 MG capsule  Commonly known as:  NEURONTIN  TAKE 2 CAPSULES (800 MG TOTAL) BY MOUTH 3 (THREE) TIMES DAILY.     hydroCHLOROthiazide 25 MG tablet  Commonly known as:  HYDRODIURIL  Take 1 tablet (25 mg total) by mouth once daily.     hydroxyurea 500 mg Cap  Commonly known as:  HYDREA  TAKE 1 CAPSULE BY MOUTH TWICE A DAY     oxyCODONE-acetaminophen  mg per tablet  Commonly known as:  PERCOCET  Take 1 tablet by mouth every 4 (four) hours as needed for Pain.     promethazine 25 MG tablet  Commonly known as:  PHENERGAN  Take 1 tablet (25 mg total) by mouth  every 6 (six) hours as needed for Nausea.     VITAMIN C ORAL  Take 1 capsule by mouth once daily.     VITAMIN D3 ORAL  Take 1 capsule by mouth once daily.            Indwelling Lines/Drains at time of discharge:   Lines/Drains/Airways     None                 Time spent on the discharge of patient: 45 minutes  Patient was seen and examined on the date of discharge and determined to be suitable for discharge.         Jessica Arellano MD  Department of Hospital Medicine  [facility]

## 2020-06-11 NOTE — PROGRESS NOTES
Notified MD on call of patient c/o 10/10 after all prn meds have been given and is requesting something for breakthrough pain. New orders placed will continue to monitor.

## 2020-06-12 ENCOUNTER — PATIENT OUTREACH (OUTPATIENT)
Dept: ADMINISTRATIVE | Facility: CLINIC | Age: 40
End: 2020-06-12

## 2020-06-12 NOTE — PATIENT INSTRUCTIONS
Discharge Instructions for Pulmonary Embolism  A deep vein thrombosis (DVT) is a blood clot in a large vein deep in a leg, arm, or elsewhere in the body. The clot can separate from the vein, travel to the lungs and cut off blood flow. This is a pulmonary embolism (PE). Pulmonary embolism is very serious and may cause death.   Healthcare providers use the term venous thromboembolism (VTE) to describe both DVT and PE. They use the term VTE because the two conditions are very closely related. And, because their prevention and treatment are closely related.   Home care  Taking care of yourself is very important. To help prevent more blood clots from forming, follow your healthcare provider's instructions. Do the following:  · Take your medicines exactly as instructed. Dont skip doses. If you miss a dose, call your healthcare provider and ask what you should do.    · Have all lab tests as recommended. This is very important when you take medicines to prevent blood clots.   · If your healthcare provider has instructed you to do so, wear elastic (compression stockings).  · Get up and get moving.  · While sitting for long periods of time, move your knees, ankles, feet, and toes.  Lifestyle changes  To help prevent problems with your heart and blood vessels, do the following:   · If you smoke, get help to quit. Talk with your healthcare provider about medicines and programs that can help.  · Stay at a healthy weight. Talk to your healthcare provider about losing weight, if you are overweight  · Try to exercise at least 30 minutes on most days. Before starting an exercise program, talk with your healthcare provider.   · When traveling by car, make frequent stops to get up and move around.  · On long airplane rides, get up and move around when possible. If you cant get up, wiggle your toes, move your ankles and tighten your calves to keep your blood moving.  Follow-up care  Make a follow-up appointment as directed  Have  your lab work done as directed.     When to seek medical advice  Call your healthcare provider if you have pain, swelling, and redness in your leg, arm, or other body area. These symptoms may mean another blood clot.  And, call your healthcare provider if you have signs and symptoms of bleeding, like blood in your urine, bleeding with bowel movements, or bleeding from the nose, gums, a cut, or vagina.   Call 911  Call 911 or get emergency help if you have symptoms of a blood clot in the lungs including:   · Chest pain  · Trouble breathing  · Coughing (may cough up blood)  · Fast heartbeat  · Sweating  · Fainting  Also call 911 if you have:  · Heavy or uncontrolled bleeding. If you are taking a blood thinner, you have an increased chance of bleeding.     © 5713-8078 [employer]. [address] [place] [address], [place] [address]. All rights reserved. This information is not intended as a substitute for professional medical care. Always follow your healthcare professional's instructions.

## 2020-06-12 NOTE — PLAN OF CARE
Patient discharged home to care of self on 6/11/20.     06/12/20 0759   Final Note   Assessment Type Final Discharge Note   Anticipated Discharge Disposition Home   What phone number can be called within the next 1-3 days to see how you are doing after discharge?   ([phone])   Hospital Follow Up  Appt(s) scheduled? Yes   Discharge plans and expectations educations in teach back method with documentation complete? Yes   Right Care Referral Info   Post Acute Recommendation No Care

## 2020-06-12 NOTE — PROGRESS NOTES
C3 nurse attempted to contact patient. No answer. The following message was left for the patient to return the call:  Good morning, I am a nurse calling on behalf of [facility] from the [facility].  This is a Transitional Care Call for Nazanin Malone. When you have a moment please contact us at [phone] or [phone] Monday through Friday, between the hours of 8 am to 4 pm. We look forward to speaking with you. On behalf of [facility] have a nice day.    The patient does not have a scheduled HOSFU appointment within 7-14 days post hospital discharge date 6/11/20. Non [facility] PCP

## 2020-06-15 DIAGNOSIS — D57.40 SICKLE CELL BETA THALASSEMIA: ICD-10-CM

## 2020-06-15 RX ORDER — OXYCODONE AND ACETAMINOPHEN 10; 325 MG/1; MG/1
1 TABLET ORAL EVERY 4 HOURS PRN
Qty: 120 TABLET | Refills: 0 | Status: SHIPPED | OUTPATIENT
Start: 2020-06-15 | End: 2020-07-05 | Stop reason: SDUPTHER

## 2020-06-15 RX ORDER — OXYCODONE AND ACETAMINOPHEN 10; 325 MG/1; MG/1
1 TABLET ORAL EVERY 4 HOURS PRN
Qty: 120 TABLET | Refills: 0 | Status: SHIPPED | OUTPATIENT
Start: 2020-06-15 | End: 2020-06-15 | Stop reason: SDUPTHER

## 2020-06-15 NOTE — TELEPHONE ENCOUNTER
"----- Message from Xi Aparicio sent at 6/15/2020  3:05 PM CDT -----  Patient Assist    Name of caller: Nazanin   Provider name:   Contact Preference:  [phone]  Is this regarding current patient or new patient?: current   What is the nature of the call?  - will  at [facility]acetaminophen (PERCOCET)  mg per tablet  Requesting  a refill of listed script     Additional Notes:   "Thank you for all that you do for our patients'"          "

## 2020-06-15 NOTE — TELEPHONE ENCOUNTER
----- Message from Bhakti Soto sent at 6/15/2020  8:52 AM CDT -----  Regarding: Refill  Reason: Calling to speak with staff also refill request for oxyCODONE-acetaminophen (PERCOCET)  mg per tablet    Communication: [phone]

## 2020-07-05 DIAGNOSIS — D57.40 SICKLE CELL BETA THALASSEMIA: ICD-10-CM

## 2020-07-06 RX ORDER — OXYCODONE AND ACETAMINOPHEN 10; 325 MG/1; MG/1
1 TABLET ORAL EVERY 4 HOURS PRN
Qty: 120 TABLET | Refills: 0 | Status: SHIPPED | OUTPATIENT
Start: 2020-07-06 | End: 2020-07-24 | Stop reason: SDUPTHER

## 2020-07-06 NOTE — TELEPHONE ENCOUNTER
----- Message from Cuca Jaimes sent at 7/6/2020  3:47 PM CDT -----  Contact: [phone]  Caller wants OXYCODONE w Tylenol.  Using [facility]. ([facility]) .   Pls send this in today.

## 2020-07-07 ENCOUNTER — TELEPHONE (OUTPATIENT)
Dept: HEMATOLOGY/ONCOLOGY | Facility: CLINIC | Age: 40
End: 2020-07-07

## 2020-07-07 DIAGNOSIS — Z79.01 ALTERATION IN ANTICOAGULATION: ICD-10-CM

## 2020-07-07 DIAGNOSIS — Z51.81 ALTERATION IN ANTICOAGULATION: ICD-10-CM

## 2020-07-07 DIAGNOSIS — D50.0 IRON DEFICIENCY ANEMIA DUE TO CHRONIC BLOOD LOSS: Primary | ICD-10-CM

## 2020-07-07 NOTE — TELEPHONE ENCOUNTER
Informed pt that pain meds have been sent in to main campus pharmacy. Pt was appreciative of call. Pt reporting having been hospitalized for 5 days and discharged on eliquis for bilateral PE's. Discussed wisunitha Montgomery. Scheduled pt for f/u and labs next month, 8/4/2020. Pt confirmed apts.

## 2020-07-07 NOTE — TELEPHONE ENCOUNTER
----- Message from Jarett Euceda sent at 7/7/2020 11:06 AM CDT -----  Contact: Patient  Refill or New Rx: Refill    RX Name and Strength: oxyCODONE-acetaminophen (PERCOCET)  mg per tablet    [facility]    Best Call Back Number: [phone]    Additional Information: Pt is completely out of this medication. And also wants to speak with the nurse

## 2020-08-04 ENCOUNTER — OFFICE VISIT (OUTPATIENT)
Dept: HEMATOLOGY/ONCOLOGY | Facility: CLINIC | Age: 40
End: 2020-08-04
Payer: MEDICAID

## 2020-08-04 ENCOUNTER — LAB VISIT (OUTPATIENT)
Dept: LAB | Facility: HOSPITAL | Age: 40
End: 2020-08-04
Attending: INTERNAL MEDICINE
Payer: MEDICAID

## 2020-08-04 VITALS
BODY MASS INDEX: 57.8 KG/M2 | RESPIRATION RATE: 20 BRPM | HEART RATE: 86 BPM | DIASTOLIC BLOOD PRESSURE: 106 MMHG | SYSTOLIC BLOOD PRESSURE: 155 MMHG | WEIGHT: 293 LBS | TEMPERATURE: 99 F | OXYGEN SATURATION: 99 %

## 2020-08-04 DIAGNOSIS — D57.1 HB-SS DISEASE WITHOUT CRISIS: ICD-10-CM

## 2020-08-04 DIAGNOSIS — I27.82 CHRONIC SEPTIC PULMONARY EMBOLISM WITH ACUTE COR PULMONALE: ICD-10-CM

## 2020-08-04 DIAGNOSIS — Z79.01 ALTERATION IN ANTICOAGULATION: ICD-10-CM

## 2020-08-04 DIAGNOSIS — D57.1 HB-SS DISEASE WITHOUT CRISIS: Primary | ICD-10-CM

## 2020-08-04 DIAGNOSIS — D50.0 IRON DEFICIENCY ANEMIA DUE TO CHRONIC BLOOD LOSS: ICD-10-CM

## 2020-08-04 DIAGNOSIS — D57.00 SICKLE-CELL DISEASE WITH PAIN: ICD-10-CM

## 2020-08-04 DIAGNOSIS — Z51.81 ALTERATION IN ANTICOAGULATION: ICD-10-CM

## 2020-08-04 DIAGNOSIS — I26.01 CHRONIC SEPTIC PULMONARY EMBOLISM WITH ACUTE COR PULMONALE: ICD-10-CM

## 2020-08-04 LAB
BASOPHILS # BLD AUTO: 0.02 K/UL (ref 0–0.2)
BASOPHILS NFR BLD: 0.4 % (ref 0–1.9)
DIFFERENTIAL METHOD: ABNORMAL
EOSINOPHIL # BLD AUTO: 0.2 K/UL (ref 0–0.5)
EOSINOPHIL NFR BLD: 3.3 % (ref 0–8)
ERYTHROCYTE [DISTWIDTH] IN BLOOD BY AUTOMATED COUNT: 16.4 % (ref 11.5–14.5)
FERRITIN SERPL-MCNC: 28 NG/ML (ref 20–300)
HCT VFR BLD AUTO: 35 % (ref 37–48.5)
HGB BLD-MCNC: 11.7 G/DL (ref 12–16)
IMM GRANULOCYTES # BLD AUTO: 0.02 K/UL (ref 0–0.04)
IMM GRANULOCYTES NFR BLD AUTO: 0.4 % (ref 0–0.5)
INR PPP: 1 (ref 0.8–1.2)
LYMPHOCYTES # BLD AUTO: 2.7 K/UL (ref 1–4.8)
LYMPHOCYTES NFR BLD: 50 % (ref 18–48)
MCH RBC QN AUTO: 29.9 PG (ref 27–31)
MCHC RBC AUTO-ENTMCNC: 33.4 G/DL (ref 32–36)
MCV RBC AUTO: 90 FL (ref 82–98)
MONOCYTES # BLD AUTO: 0.5 K/UL (ref 0.3–1)
MONOCYTES NFR BLD: 8.5 % (ref 4–15)
NEUTROPHILS # BLD AUTO: 2 K/UL (ref 1.8–7.7)
NEUTROPHILS NFR BLD: 37.4 % (ref 38–73)
NRBC BLD-RTO: 4 /100 WBC
PLATELET # BLD AUTO: 150 K/UL (ref 150–350)
PMV BLD AUTO: 9.1 FL (ref 9.2–12.9)
PROTHROMBIN TIME: 10.7 SEC (ref 9–12.5)
RBC # BLD AUTO: 3.91 M/UL (ref 4–5.4)
WBC # BLD AUTO: 5.4 K/UL (ref 3.9–12.7)

## 2020-08-04 PROCEDURE — 99999 PR PBB SHADOW E&M-EST. PATIENT-LVL IV: CPT | Mod: PBBFAC,,, | Performed by: INTERNAL MEDICINE

## 2020-08-04 PROCEDURE — 85025 COMPLETE CBC W/AUTO DIFF WBC: CPT

## 2020-08-04 PROCEDURE — 85610 PROTHROMBIN TIME: CPT

## 2020-08-04 PROCEDURE — 99214 OFFICE O/P EST MOD 30 MIN: CPT | Mod: PBBFAC | Performed by: INTERNAL MEDICINE

## 2020-08-04 PROCEDURE — 99214 PR OFFICE/OUTPT VISIT, EST, LEVL IV, 30-39 MIN: ICD-10-PCS | Mod: S$PBB,,, | Performed by: INTERNAL MEDICINE

## 2020-08-04 PROCEDURE — 36415 COLL VENOUS BLD VENIPUNCTURE: CPT

## 2020-08-04 PROCEDURE — 99214 OFFICE O/P EST MOD 30 MIN: CPT | Mod: S$PBB,,, | Performed by: INTERNAL MEDICINE

## 2020-08-04 PROCEDURE — 82728 ASSAY OF FERRITIN: CPT

## 2020-08-04 PROCEDURE — 99999 PR PBB SHADOW E&M-EST. PATIENT-LVL IV: ICD-10-PCS | Mod: PBBFAC,,, | Performed by: INTERNAL MEDICINE

## 2020-08-04 RX ORDER — HYDROXYUREA 500 MG/1
1000 CAPSULE ORAL 2 TIMES DAILY
Qty: 120 CAPSULE | Refills: 5 | Status: SHIPPED | OUTPATIENT
Start: 2020-08-04 | End: 2020-10-12 | Stop reason: SDUPTHER
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
## 2020-08-12 ENCOUNTER — INFUSION (OUTPATIENT)
Dept: INFUSION THERAPY | Facility: HOSPITAL | Age: 40
End: 2020-08-12
Payer: MEDICAID

## 2020-08-12 ENCOUNTER — TELEPHONE (OUTPATIENT)
Dept: HEMATOLOGY/ONCOLOGY | Facility: CLINIC | Age: 40
End: 2020-08-12

## 2020-08-12 VITALS
TEMPERATURE: 99 F | HEART RATE: 88 BPM | RESPIRATION RATE: 18 BRPM | DIASTOLIC BLOOD PRESSURE: 99 MMHG | SYSTOLIC BLOOD PRESSURE: 172 MMHG

## 2020-08-12 DIAGNOSIS — D57.40 SICKLE CELL BETA THALASSEMIA: ICD-10-CM

## 2020-08-12 DIAGNOSIS — D50.9 IRON DEFICIENCY ANEMIA, UNSPECIFIED IRON DEFICIENCY ANEMIA TYPE: ICD-10-CM

## 2020-08-12 DIAGNOSIS — D57.00 HB-SS DISEASE WITH VASO-OCCLUSIVE PAIN: Primary | ICD-10-CM

## 2020-08-12 DIAGNOSIS — D57.00 SICKLE-CELL DISEASE WITH PAIN: ICD-10-CM

## 2020-08-12 DIAGNOSIS — D57.439 SICKLE CELL DISEASE, TYPE S BETA-ZERO THALASSEMIA WITH CRISIS: ICD-10-CM

## 2020-08-12 PROCEDURE — 96375 TX/PRO/DX INJ NEW DRUG ADDON: CPT

## 2020-08-12 PROCEDURE — 96374 THER/PROPH/DIAG INJ IV PUSH: CPT

## 2020-08-12 PROCEDURE — 96361 HYDRATE IV INFUSION ADD-ON: CPT

## 2020-08-12 PROCEDURE — 63600175 PHARM REV CODE 636 W HCPCS: Performed by: NURSE PRACTITIONER

## 2020-08-12 PROCEDURE — 25000003 PHARM REV CODE 250: Performed by: NURSE PRACTITIONER

## 2020-08-12 RX ORDER — HEPARIN 100 UNIT/ML
500 SYRINGE INTRAVENOUS
Status: DISCONTINUED | OUTPATIENT
Start: 2020-08-12 | End: 2020-08-12 | Stop reason: HOSPADM

## 2020-08-12 RX ORDER — OXYCODONE AND ACETAMINOPHEN 10; 325 MG/1; MG/1
1 TABLET ORAL EVERY 4 HOURS PRN
Qty: 120 TABLET | Refills: 0 | Status: CANCELLED | OUTPATIENT
Start: 2020-08-12

## 2020-08-12 RX ORDER — MORPHINE SULFATE 10 MG/ML
2 INJECTION, SOLUTION INTRAMUSCULAR; INTRAVENOUS ONCE
Status: CANCELLED
Start: 2020-08-12

## 2020-08-12 RX ORDER — SODIUM CHLORIDE 0.9 % (FLUSH) 0.9 %
10 SYRINGE (ML) INJECTION
Status: DISCONTINUED | OUTPATIENT
Start: 2020-08-12 | End: 2020-08-12 | Stop reason: HOSPADM

## 2020-08-12 RX ORDER — HEPARIN 100 UNIT/ML
500 SYRINGE INTRAVENOUS
Status: CANCELLED | OUTPATIENT
Start: 2020-08-12

## 2020-08-12 RX ORDER — MORPHINE SULFATE 2 MG/ML
3 INJECTION, SOLUTION INTRAMUSCULAR; INTRAVENOUS ONCE
Status: CANCELLED | OUTPATIENT
Start: 2020-08-12

## 2020-08-12 RX ORDER — DIPHENHYDRAMINE HYDROCHLORIDE 50 MG/ML
25 INJECTION INTRAMUSCULAR; INTRAVENOUS ONCE
Status: COMPLETED | OUTPATIENT
Start: 2020-08-12 | End: 2020-08-12

## 2020-08-12 RX ORDER — MORPHINE SULFATE 2 MG/ML
3 INJECTION, SOLUTION INTRAMUSCULAR; INTRAVENOUS ONCE
Status: COMPLETED | OUTPATIENT
Start: 2020-08-12 | End: 2020-08-12

## 2020-08-12 RX ORDER — SODIUM CHLORIDE 0.9 % (FLUSH) 0.9 %
10 SYRINGE (ML) INJECTION
Status: CANCELLED | OUTPATIENT
Start: 2020-08-12

## 2020-08-12 RX ORDER — DIPHENHYDRAMINE HYDROCHLORIDE 50 MG/ML
25 INJECTION INTRAMUSCULAR; INTRAVENOUS ONCE
Status: CANCELLED | OUTPATIENT
Start: 2020-08-12

## 2020-08-12 RX ORDER — MORPHINE SULFATE 2 MG/ML
2 INJECTION, SOLUTION INTRAMUSCULAR; INTRAVENOUS ONCE
Status: COMPLETED | OUTPATIENT
Start: 2020-08-12 | End: 2020-08-12

## 2020-08-12 RX ADMIN — MORPHINE SULFATE 3 MG: 2 INJECTION, SOLUTION INTRAMUSCULAR; INTRAVENOUS at 03:08

## 2020-08-12 RX ADMIN — MORPHINE SULFATE 2 MG: 2 INJECTION, SOLUTION INTRAMUSCULAR; INTRAVENOUS at 04:08

## 2020-08-12 RX ADMIN — DIPHENHYDRAMINE HYDROCHLORIDE 25 MG: 50 INJECTION INTRAMUSCULAR; INTRAVENOUS at 03:08

## 2020-08-12 RX ADMIN — SODIUM CHLORIDE 1000 ML: 0.9 INJECTION, SOLUTION INTRAVENOUS at 03:08

## 2020-08-12 NOTE — TELEPHONE ENCOUNTER
----- Message from Jarett Euceda sent at 8/12/2020  2:12 PM CDT -----  Contact: Patient  Caller is requesting a same day appointment.      Name of Caller:: Pt    Symptoms:: All over pain, a 10 on the scale    Provider:: Ulises Brady Call Back Number:: [phone]    Additional Information:: Wants to come in for a pain infusion.

## 2020-08-12 NOTE — PROGRESS NOTES
Gave additional 2 mg IV morphine for unrelieved sickle cell pain.     Tiffani Ge, FNP  Hematology/Oncology/Bone Marrow Transplant

## 2020-08-13 RX ORDER — OXYCODONE AND ACETAMINOPHEN 10; 325 MG/1; MG/1
1 TABLET ORAL EVERY 4 HOURS PRN
Qty: 120 TABLET | Refills: 0 | Status: SHIPPED | OUTPATIENT
Start: 2020-08-13 | End: 2020-09-02 | Stop reason: SDUPTHER

## 2020-08-14 ENCOUNTER — TELEPHONE (OUTPATIENT)
Dept: HEMATOLOGY/ONCOLOGY | Facility: CLINIC | Age: 40
End: 2020-08-14

## 2020-08-14 NOTE — TELEPHONE ENCOUNTER
Informed pt prescription was sent over to [facility]. Pt was appreciative of call.

## 2020-08-14 NOTE — TELEPHONE ENCOUNTER
"----- Message from Patricia Alejo sent at 8/14/2020 10:01 AM CDT -----  Refill     RX Name and Strength:  oxyCODONE-acetaminophen (PERCOCET)  mg per tablet    How is the patient currently taking it?   Sig - Route: Take 1 tablet by mouth every 4 (four) hours as needed for Pain. - Oral    Is this a 30 day or 90 day Rx? 30 day     Preferred Pharmacy with phone number:  [facility]  Local or Mail Order: Local   Ordering Provider: Dr Montgomery   Contact Preference: [phone]    Additional Information:  "Thank you for all that you do for our patients"         "

## 2020-08-20 NOTE — PLAN OF CARE
Problem: Patient Care Overview  Goal: Plan of Care Review  Outcome: Ongoing (interventions implemented as appropriate)  A/o v/s stable verbalized sob o2 sat 93 % on 1.5 L NC anxious hyper ventilating MD notify pcxr done see result and dilaudid ivp given then pt went back to sleep and snoring with continous IVF c/o pain left arm medicated with dilaudid and benadry , no injury free of falls plan of care reviewed with pt.       Cheek To Nose Interpolation Flap Division And Inset Text: Division and inset of the cheek to nose interpolation flap was performed to achieve optimal aesthetic result, restore normal anatomic appearance and avoid distortion of normal anatomy, expedite and facilitate wound healing, achieve optimal functional result and because linear closure either not possible or would produce suboptimal result. The patient was prepped and draped in the usual manner. The pedicle was infiltrated with local anesthesia. The pedicle was sectioned with a #15 blade. The pedicle was de-bulked and trimmed to match the shape of the defect. Hemostasis was achieved. The flap donor site and free margin of the flap were secured with deep buried sutures and the wound edges were re-approximated.

## 2020-08-24 NOTE — PLAN OF CARE
Plan of care reviewed with pt. Pt remains free of falls or injuries. Pain control achieved with scheduled PO and PRN IV medications. Pt is compliant with all requests. Denies questions at this time. DASH. MILLICENT   Spoke with pt, he said that would be fine. Do you want UA and UC sent?    Thanks, Chelsey

## 2020-09-01 DIAGNOSIS — G50.0 TRIGEMINAL NEURALGIA: ICD-10-CM

## 2020-09-01 DIAGNOSIS — D57.1 HB-SS DISEASE WITHOUT CRISIS: ICD-10-CM

## 2020-09-01 DIAGNOSIS — F43.20 ADJUSTMENT DISORDER, UNSPECIFIED TYPE: ICD-10-CM

## 2020-09-01 RX ORDER — GABAPENTIN 400 MG/1
800 CAPSULE ORAL 3 TIMES DAILY
Qty: 180 CAPSULE | Refills: 0 | Status: SHIPPED | OUTPATIENT
Start: 2020-09-01 | End: 2020-09-02 | Stop reason: SDUPTHER

## 2020-09-01 RX ORDER — FLUOXETINE HYDROCHLORIDE 40 MG/1
40 CAPSULE ORAL DAILY
Qty: 30 CAPSULE | Refills: 2 | Status: SHIPPED | OUTPATIENT
Start: 2020-09-01 | End: 2020-09-02 | Stop reason: SDUPTHER

## 2020-09-01 NOTE — TELEPHONE ENCOUNTER
----- Message from Sugar Carreno sent at 2020  2:20 PM CDT -----  Regarding: Refill Request  Contact: PT  PT called - requesting refills and wondering if she can get a fill on her pain medication too please, states she's in a lot of pain    gabapentin (NEURONTIN) 400 MG capsule ()  oxyCODONE-acetaminophen (PERCOCET)  mg per tablet  FLUoxetine 40 MG capsule    [facility]/pharmacy #[id number] - [place], [place] - [address]  [address]  [facility] [address]  Phone: [phone] Fax: [phone]      Callback: [phone]

## 2020-09-02 DIAGNOSIS — D57.40 SICKLE CELL BETA THALASSEMIA: ICD-10-CM

## 2020-09-02 RX ORDER — OXYCODONE AND ACETAMINOPHEN 10; 325 MG/1; MG/1
1 TABLET ORAL EVERY 4 HOURS PRN
Qty: 120 TABLET | Refills: 0 | Status: CANCELLED | OUTPATIENT
Start: 2020-09-02

## 2020-09-02 NOTE — TELEPHONE ENCOUNTER
"----- Message from Patricia Alejo sent at 9/2/2020  2:02 PM CDT -----  Refill     RX Name and Strength:  oxyCODONE-acetaminophen (PERCOCET)  mg per tablet    How is the patient currently taking it?   Sig - Route: Take 1 tablet by mouth every 4 (four) hours as needed for Pain. - Oral    Is this a 30 day or 90 day Rx? 30 day     Preferred Pharmacy with phone number:  [facility]    Local or Mail Order: Local   Ordering Provider: Dr Montgomery  Contact Preference: [phone]    Additional Information:  "Thank you for all that you do for our patients"         "

## 2020-09-03 RX ORDER — OXYCODONE AND ACETAMINOPHEN 10; 325 MG/1; MG/1
1 TABLET ORAL EVERY 4 HOURS PRN
Qty: 120 TABLET | Refills: 0 | Status: SHIPPED | OUTPATIENT
Start: 2020-09-03 | End: 2020-09-22 | Stop reason: SDUPTHER

## 2020-09-09 ENCOUNTER — TELEPHONE (OUTPATIENT)
Dept: HEMATOLOGY/ONCOLOGY | Facility: CLINIC | Age: 40
End: 2020-09-09

## 2020-09-09 NOTE — TELEPHONE ENCOUNTER
----- Message from Chelsea Jones NP sent at 9/9/2020 12:24 PM CDT -----  Regarding: FW: Patient advice  Contact: Patient  Patient of [facility]. Sickle cell. Was supposed to see today and wants to reschedule. Can see me or Marla tomorrow or Marla on Friday. Whatever the patient prefers we can call her. We both have slots available.    Thanks,  Chelsea Jones NP  Hematology/Oncology/BMT    ----- Message -----  From: Paolo Arriola  Sent: 9/9/2020  12:15 PM CDT  To: [facility] Tran Staff  Subject: Patient advice                                   Pt called in regards to rescheduling appointment     Pt stated that her feet were swollen and wanted to see if she can come tomorrow or Friday if possible     Pt can be reached at [phone]

## 2020-09-09 NOTE — PROGRESS NOTES
Audio Only Telehealth Visit     The patient location is: grocery store  The chief complaint leading to consultation is: routine sickle cell follow up  Visit type: Virtual visit with audio only (telephone)  Total time spent with patient: 15min     The reason for the audio only service rather than synchronous audio and video virtual visit was related to technical difficulties or patient preference/necessity.     Each patient to whom I provide medical services by telemedicine is:  (1) informed of the relationship between the physician and patient and the respective role of any other health care provider with respect to management of the patient; and (2) notified that they may decline to receive medical services by telemedicine and may withdraw from such care at any time. Patient verbally consented to receive this service via voice-only telephone call.    This service was not originating from a related E/M service provided within the previous 7 days nor will  to an E/M service or procedure within the next 24 hours or my soonest available appointment.  Prevailing standard of care was able to be met in this audio-only visit.           SECTION OF HEMATOLOGY AND BONE MARROW TRANSPLANT  Return Patient Visit   09/09/2020    HISTORY OF PRESENT ILLNESS:   40 y.o. female ss disease out crisis; surveillance fu  Reports feeling well overall. She is currently at the grocery store. Pain is well controlled. Denies fevers, chills, chest pain, sob, n/v/d/c  She has chronic intermittent BLE edema, states she fell asleep in the chair two nights ago and woke up with her legs swollen. She elevated them last night and her swelling has improved. She continues on her eliquis for hx of PE. She denies any issues with bleeding or bruising      PAST MEDICAL HISTORY:   Past Medical History:   Diagnosis Date    Abnormal Pap smear of cervix 2013    colposcopy    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 4/29/2017    Asthma      Depression     Hypertension     Morbid obesity     Opioid dependence 2017    Pneumonia due to Streptococcus pneumoniae 2017    Right lower lobe pneumonia 2017    Sepsis due to Streptococcus pneumoniae 2017    Sickle cell-beta thalassemia disease with pain     Trigeminal neuralgia        PAST SURGICAL HISTORY:   Past Surgical History:   Procedure Laterality Date     SECTION      TONSILLECTOMY      TUBAL LIGATION         PAST SOCIAL HISTORY:   reports that she has never smoked. She has never used smokeless tobacco. She reports current drug use. Drug: Marijuana. She reports that she does not drink alcohol.    FAMILY HISTORY:  Family History   Problem Relation Age of Onset    Heart disease Mother     Diabetes Mother     Heart disease Father     Breast cancer Neg Hx     Ovarian cancer Neg Hx     Colon cancer Neg Hx        CURRENT MEDICATIONS:   Current Outpatient Medications   Medication Sig    albuterol (VENTOLIN HFA) 90 mcg/actuation inhaler Inhale 2 puffs into the lungs every 6 (six) hours as needed for Wheezing or Shortness of Breath. Rescue    albuterol-ipratropium (DUO-NEB) 2.5 mg-0.5 mg/3 mL nebulizer solution Take 3 mLs by nebulization every 6 (six) hours as needed for Wheezing or Shortness of Breath. Rescue    amLODIPine (NORVASC) 10 MG tablet TAKE 1 TABLET BY MOUTH EVERY DAY    apixaban (ELIQUIS) 5 mg Tab Take 2 tablets (10 mg total) by mouth 2 (two) times daily until 6/15/2020.   Afterward, take 1 tablet (5 mg) twice daily indifinitely.    ascorbic acid (VITAMIN C ORAL) Take 1 capsule by mouth once daily.    carBAMazepine (TEGRETOL) 200 mg tablet Take 4 tablets (800 mg total) by mouth 2 (two) times daily.    carvedilol (COREG) 25 MG tablet Take 1 tablet (25 mg total) by mouth 2 (two) times daily.    cholecalciferol, vitamin D3, (VITAMIN D3 ORAL) Take 1 capsule by mouth once daily.    cyclobenzaprine (FLEXERIL) 10 MG tablet TAKE 1 TABLET BY MOUTH THREE TIMES  A DAY AS NEEDED    FLUoxetine 40 MG capsule Take 1 capsule (40 mg total) by mouth once daily.    fluticasone propionate (FLONASE) 50 mcg/actuation nasal spray 1 SPRAY (50 MCG TOTAL) BY EACH NOSTRIL ROUTE ONCE DAILY.    fluticasone-salmeterol diskus inhaler 100-50 mcg Inhale 1 puff into the lungs once daily. Controller    folic acid (FOLVITE) 400 MCG tablet Take 400 mcg by mouth once daily.    gabapentin (NEURONTIN) 400 MG capsule Take 2 capsules (800 mg total) by mouth 3 (three) times daily.    hydroCHLOROthiazide (HYDRODIURIL) 25 MG tablet TAKE 1 TABLET BY MOUTH EVERY DAY    hydroxyurea (HYDREA) 500 mg Cap Take 2 capsules (1,000 mg total) by mouth 2 (two) times daily.    oxyCODONE-acetaminophen (PERCOCET)  mg per tablet Take 1 tablet by mouth every 4 (four) hours as needed for Pain.    promethazine (PHENERGAN) 25 MG tablet Take 1 tablet (25 mg total) by mouth every 6 (six) hours as needed for Nausea.    senna-docusate 8.6-50 mg (PERICOLACE) 8.6-50 mg per tablet Take 1 tablet by mouth 2 (two) times daily as needed for Constipation. (Patient taking differently: Take 1 tablet by mouth once daily. )     No current facility-administered medications for this visit.      ALLERGIES:   Review of patient's allergies indicates:  No Known Allergies      REVIEW OF SYSTEMS:   Review of Systems   Constitutional: Negative for chills, fever and malaise/fatigue.   HENT: Negative for nosebleeds.    Eyes: Negative for blurred vision and discharge.   Respiratory: Negative for cough and shortness of breath.    Cardiovascular: Positive for leg swelling. Negative for chest pain and palpitations.   Gastrointestinal: Negative for abdominal pain, blood in stool, constipation, diarrhea, nausea and vomiting.   Genitourinary: Negative for dysuria, frequency and hematuria.   Musculoskeletal: Negative for back pain.   Skin: Negative for rash.   Neurological: Negative for tingling and weakness.   Psychiatric/Behavioral: The patient  is not nervous/anxious.        PHYSICAL EXAM:   There were no vitals filed for this visit.  Limited due to audio visit    ECOG Performance Status: (foot note - ECOG PS provided by Eastern Cooperative Oncology Group) 1 - Symptomatic but completely ambulatory    Karnofsky Performance Score:  90%- Able to Carry on Normal Activity: Minor Symptoms of Disease  DATA:   Lab Results   Component Value Date    WBC 5.40 08/04/2020    HGB 11.7 (L) 08/04/2020    HCT 35.0 (L) 08/04/2020    MCV 90 08/04/2020     08/04/2020       Gran # (ANC)   Date Value Ref Range Status   08/04/2020 2.0 1.8 - 7.7 K/uL Final     Gran%   Date Value Ref Range Status   08/04/2020 37.4 (L) 38.0 - 73.0 % Final     CMP  Sodium   Date Value Ref Range Status   06/11/2020 139 136 - 145 mmol/L Final     Potassium   Date Value Ref Range Status   06/11/2020 3.7 3.5 - 5.1 mmol/L Final     Chloride   Date Value Ref Range Status   06/11/2020 102 95 - 110 mmol/L Final     CO2   Date Value Ref Range Status   06/11/2020 30 (H) 23 - 29 mmol/L Final     Glucose   Date Value Ref Range Status   06/11/2020 81 70 - 110 mg/dL Final     BUN, Bld   Date Value Ref Range Status   06/11/2020 8 6 - 20 mg/dL Final     Creatinine   Date Value Ref Range Status   06/11/2020 0.8 0.5 - 1.4 mg/dL Final     Calcium   Date Value Ref Range Status   06/11/2020 8.8 8.7 - 10.5 mg/dL Final     Total Protein   Date Value Ref Range Status   06/11/2020 7.2 6.0 - 8.4 g/dL Final     Albumin   Date Value Ref Range Status   06/11/2020 3.2 (L) 3.5 - 5.2 g/dL Final     Total Bilirubin   Date Value Ref Range Status   06/11/2020 0.4 0.1 - 1.0 mg/dL Final     Comment:     For infants and newborns, interpretation of results should be based  on gestational age, weight and in agreement with clinical  observations.  Premature Infant recommended reference ranges:  Up to 24 hours.............<8.0 mg/dL  Up to 48 hours............<12.0 mg/dL  3-5 days..................<15.0 mg/dL  6-29  days.................<15.0 mg/dL       Alkaline Phosphatase   Date Value Ref Range Status   06/11/2020 70 55 - 135 U/L Final     AST   Date Value Ref Range Status   06/11/2020 15 10 - 40 U/L Final     ALT   Date Value Ref Range Status   06/11/2020 7 (L) 10 - 44 U/L Final     Anion Gap   Date Value Ref Range Status   06/11/2020 7 (L) 8 - 16 mmol/L Final     eGFR if    Date Value Ref Range Status   06/11/2020 >60.0 >60 mL/min/1.73 m^2 Final     eGFR if non    Date Value Ref Range Status   06/11/2020 >60.0 >60 mL/min/1.73 m^2 Final     Comment:     Calculation used to obtain the estimated glomerular filtration  rate (eGFR) is the CKD-EPI equation.        CTA  Chest  - 6/8/20  Impression:     Artifact is present diminishing the sensitivity of the examination, and there is no large central saddle type pulmonary embolus however multiple intraluminal filling defects within the segmental pulmonary arteries bilaterally are noted, and even when accounting for artifact the appearance is most concerning for pulmonary emboli.     Patchy ground-glass opacities within the bilateral lower lung zones as well as more confluent consolidation within the right lower lobe.  Findings may relate to evolving pulmonary infarcts, infection/pneumonia, edema, hemorrhage, or combination of the aforementioned etiologies.     This report was flagged in Epic as abnormal.     Dr. Rey reported the findings to Dr. Dalton prior to dication.     Electronically signed by resident: Patric Rey  Date:                                            06/08/2020  Time:                                           04:49     Electronically signed by: Burton Jc  Date:                                            06/08/2020    ASSESSMENT AND PLAN:   Encounter Diagnoses   Name Primary?    Hb-SS disease without crisis Yes    Iron deficiency anemia due to chronic blood loss     Essential hypertension     Nausea     Chronic  septic pulmonary embolism with acute cor pulmonale     Bilateral lower extremity edema        Sickle Cell Disease Monitoring   1)Hydrea  - previously 1-3 crises a year; per patient with increasing severity and frequency over last 2-3 years   - initiated hydrea 500mg BID (5/22/17) but she quickly self discontinued due to mild rash and refused to restart. Restarted in January 2020; continues on current dose  - discussed l-glutamine with Dr. Montgomery in the past but insurance refused to cover.  - IVF PRN in infusion center prn   - Percocet PRN, not yet due for refill  - patient reports nausea due to hydrea, continues on Phenergan PRN    2)Iron status   - history of iron deficiency from menorrhagnia, likely contributing to fatigue/anemia  - feraheme given 1/2020; ferritin, hgb normal aug 2020; recheck 3 months   - encourage pt to fu with ob/gyn    3)AVN  - has chronic bilateral hip pain; stable  - will discuss obtaining MRI if pain worsens    4)LE Ulcerations   NA    5)HTN  - continue norvasc 10; takes nightly  - continue hctz to 25mg daily (8/20/18); takes nightly  - Coreg 12.5mg BID start today (5/21/19); increased to 25mg bid given htn; careful attn to asthma exacerbating  - recommend fu in IM clinic for chronic mgmt     6)Opthalmic  - encouraged her to make appt with ophthalmology; does have an appointment this month or next month with eye doctor     7)Pain  - continue home percocet 10 q 6 hrs     8)CardioPulmonary  - states had normal TTE jan 2019; next due jan 2021  - continue inhalers     9)Renal  - April 2019 UA with no proteinuria; past due recheck next visit unable to do today due to nature of virtual visit     10)Neuro/CVA  - gives anecdotal history of subclinical stroke with no deficits  - will monitor     11)Vaccines   - receiving HIB series, menactra, prevnar followed by pneumovax    - needs next PNA vaccine 23 valent--received 13 valent in 2017    12)Contraception  - she has had tubal ligation      13)PE  - Provoked but in setting obesity, SCD, and immobility so think benefit of anticoagulation continuing outweighs risk of bleeding  - Will continue 5mg eliquis BID. Discussed with Dr. Montgomery, given such high risk patient, likely to continue indefinitely       Follow Up:  Has lab appointment 12/9, will add visit with Dr. Montgomery after.  labs to include (CBC, CMP, retic, ferritin, iron/tibc, & urinalysis)      Chelsea Jones NP  Hematology/Oncology/BMT

## 2020-09-10 ENCOUNTER — OFFICE VISIT (OUTPATIENT)
Dept: HEMATOLOGY/ONCOLOGY | Facility: CLINIC | Age: 40
End: 2020-09-10
Payer: MEDICAID

## 2020-09-10 DIAGNOSIS — D50.0 IRON DEFICIENCY ANEMIA DUE TO CHRONIC BLOOD LOSS: ICD-10-CM

## 2020-09-10 DIAGNOSIS — R60.0 BILATERAL LOWER EXTREMITY EDEMA: ICD-10-CM

## 2020-09-10 DIAGNOSIS — I27.82 CHRONIC SEPTIC PULMONARY EMBOLISM WITH ACUTE COR PULMONALE: ICD-10-CM

## 2020-09-10 DIAGNOSIS — R11.0 NAUSEA: ICD-10-CM

## 2020-09-10 DIAGNOSIS — I10 ESSENTIAL HYPERTENSION: ICD-10-CM

## 2020-09-10 DIAGNOSIS — D57.1 HB-SS DISEASE WITHOUT CRISIS: Primary | ICD-10-CM

## 2020-09-10 DIAGNOSIS — I26.01 CHRONIC SEPTIC PULMONARY EMBOLISM WITH ACUTE COR PULMONALE: ICD-10-CM

## 2020-09-10 PROCEDURE — 99214 PR OFFICE/OUTPT VISIT, EST, LEVL IV, 30-39 MIN: ICD-10-PCS | Mod: 95,,, | Performed by: NURSE PRACTITIONER

## 2020-09-10 PROCEDURE — 99214 OFFICE O/P EST MOD 30 MIN: CPT | Mod: 95,,, | Performed by: NURSE PRACTITIONER

## 2020-09-10 NOTE — Clinical Note
Has lab appointment 12/9, please add visit with Dr. Montgomery after.  Please link these labs (CBC, CMP, retic, ferritin, iron/tibc, & urinalysis)

## 2020-09-18 ENCOUNTER — TELEPHONE (OUTPATIENT)
Dept: HEMATOLOGY/ONCOLOGY | Facility: CLINIC | Age: 40
End: 2020-09-18

## 2020-09-18 DIAGNOSIS — D57.00 SICKLE-CELL DISEASE WITH PAIN: ICD-10-CM

## 2020-09-18 DIAGNOSIS — D57.1 HB-SS DISEASE WITHOUT CRISIS: Primary | ICD-10-CM

## 2020-09-18 RX ORDER — MORPHINE SULFATE 10 MG/ML
2 INJECTION, SOLUTION INTRAMUSCULAR; INTRAVENOUS ONCE AS NEEDED
Status: CANCELLED
Start: 2020-09-18

## 2020-09-18 RX ORDER — HEPARIN 100 UNIT/ML
500 SYRINGE INTRAVENOUS
Status: CANCELLED | OUTPATIENT
Start: 2020-09-18

## 2020-09-18 RX ORDER — DIPHENHYDRAMINE HYDROCHLORIDE 50 MG/ML
25 INJECTION INTRAMUSCULAR; INTRAVENOUS ONCE
Status: CANCELLED | OUTPATIENT
Start: 2020-09-18

## 2020-09-18 RX ORDER — SODIUM CHLORIDE 0.9 % (FLUSH) 0.9 %
10 SYRINGE (ML) INJECTION
Status: CANCELLED | OUTPATIENT
Start: 2020-09-18

## 2020-09-18 RX ORDER — MORPHINE SULFATE 2 MG/ML
3 INJECTION, SOLUTION INTRAMUSCULAR; INTRAVENOUS ONCE
Status: CANCELLED | OUTPATIENT
Start: 2020-09-18

## 2020-09-18 NOTE — TELEPHONE ENCOUNTER
----- Message from Issac Sutherland sent at 9/18/2020  1:38 PM CDT -----  Regarding: appointment access   called in to speak with someone at the office regarding scheduling an appointment. She would like a call back from the office and can be reached at    [phone]

## 2020-09-18 NOTE — TELEPHONE ENCOUNTER
Pt reporting she is experiencing severe generalized pain. Requesting referral to pain management. Referral placed per Dr. Montgomery. Scheduled pt for fluids and pain meds. Monday morning at 8 AM. Pt confirmed apt. Advised to go to ER if pain worsens over the weekend. Pt verbalized understanding.

## 2020-09-18 NOTE — TELEPHONE ENCOUNTER
"----- Message from Patricia Alejo sent at 9/18/2020 12:43 PM CDT -----  Consult/Advisory:    Name Of Caller: Nazanin   Contact Preference?: [phone]    Provider Name: Dr Montgomery     What is the nature of the call?:  Pt states she is in a lot of pain and would like a referral to pain management clinic. Please contact to discuss   Additional Notes:  "Thank you for all that you do for our patients'"           "

## 2020-09-21 ENCOUNTER — INFUSION (OUTPATIENT)
Dept: INFUSION THERAPY | Facility: HOSPITAL | Age: 40
End: 2020-09-21
Attending: INTERNAL MEDICINE
Payer: MEDICAID

## 2020-09-21 VITALS
HEART RATE: 86 BPM | RESPIRATION RATE: 17 BRPM | TEMPERATURE: 97 F | OXYGEN SATURATION: 97 % | SYSTOLIC BLOOD PRESSURE: 149 MMHG | DIASTOLIC BLOOD PRESSURE: 82 MMHG

## 2020-09-21 DIAGNOSIS — D57.00 HB-SS DISEASE WITH VASO-OCCLUSIVE PAIN: Primary | ICD-10-CM

## 2020-09-21 PROCEDURE — 96361 HYDRATE IV INFUSION ADD-ON: CPT

## 2020-09-21 PROCEDURE — 96374 THER/PROPH/DIAG INJ IV PUSH: CPT

## 2020-09-21 PROCEDURE — 63600175 PHARM REV CODE 636 W HCPCS: Performed by: NURSE PRACTITIONER

## 2020-09-21 PROCEDURE — 96376 TX/PRO/DX INJ SAME DRUG ADON: CPT

## 2020-09-21 PROCEDURE — 96375 TX/PRO/DX INJ NEW DRUG ADDON: CPT

## 2020-09-21 PROCEDURE — 25000003 PHARM REV CODE 250: Performed by: NURSE PRACTITIONER

## 2020-09-21 RX ORDER — DIPHENHYDRAMINE HYDROCHLORIDE 50 MG/ML
25 INJECTION INTRAMUSCULAR; INTRAVENOUS ONCE
Status: COMPLETED | OUTPATIENT
Start: 2020-09-21 | End: 2020-09-21

## 2020-09-21 RX ORDER — SODIUM CHLORIDE 0.9 % (FLUSH) 0.9 %
10 SYRINGE (ML) INJECTION
Status: DISCONTINUED | OUTPATIENT
Start: 2020-09-21 | End: 2020-09-21 | Stop reason: HOSPADM

## 2020-09-21 RX ORDER — MORPHINE SULFATE 2 MG/ML
2 INJECTION, SOLUTION INTRAMUSCULAR; INTRAVENOUS ONCE AS NEEDED
Status: COMPLETED | OUTPATIENT
Start: 2020-09-21 | End: 2020-09-21

## 2020-09-21 RX ORDER — MORPHINE SULFATE 2 MG/ML
3 INJECTION, SOLUTION INTRAMUSCULAR; INTRAVENOUS ONCE
Status: COMPLETED | OUTPATIENT
Start: 2020-09-21 | End: 2020-09-21

## 2020-09-21 RX ORDER — HEPARIN 100 UNIT/ML
500 SYRINGE INTRAVENOUS
Status: DISCONTINUED | OUTPATIENT
Start: 2020-09-21 | End: 2020-09-21 | Stop reason: HOSPADM

## 2020-09-21 RX ADMIN — MORPHINE SULFATE 2 MG: 2 INJECTION, SOLUTION INTRAMUSCULAR; INTRAVENOUS at 04:09

## 2020-09-21 RX ADMIN — MORPHINE SULFATE 3 MG: 2 INJECTION, SOLUTION INTRAMUSCULAR; INTRAVENOUS at 03:09

## 2020-09-21 RX ADMIN — DIPHENHYDRAMINE HYDROCHLORIDE 25 MG: 50 INJECTION, SOLUTION INTRAMUSCULAR; INTRAVENOUS at 03:09

## 2020-09-21 RX ADMIN — SODIUM CHLORIDE 1000 ML: 0.9 INJECTION, SOLUTION INTRAVENOUS at 03:09

## 2020-09-21 NOTE — PLAN OF CARE
Pt ambulatory to clinic for IVF's and pain meds. Hx of SSC. Complaining of pain to left leg 7/10. PIV started without difficulty. First dose of pain med given. Pain to a 5/10 but still not alleviated. Additional dose given as ordered. Pt states after pain a 3/7 and getting much better. PIV removed with catheter intact. Ambulatory from clinic with mother in NAD.

## 2020-09-22 DIAGNOSIS — D57.40 SICKLE CELL BETA THALASSEMIA: ICD-10-CM

## 2020-09-22 RX ORDER — OXYCODONE AND ACETAMINOPHEN 10; 325 MG/1; MG/1
1 TABLET ORAL EVERY 4 HOURS PRN
Qty: 120 TABLET | Refills: 0 | Status: SHIPPED | OUTPATIENT
Start: 2020-09-22 | End: 2020-10-12 | Stop reason: SDUPTHER

## 2020-09-22 NOTE — TELEPHONE ENCOUNTER
----- Message from Paolo Arriola sent at 9/22/2020 11:53 AM CDT -----  Regarding: Patient advice  Contact: pt  Pt called in regards to medication refill     oxyCODONE-acetaminophen (PERCOCET)  mg per tablet 120 tablet 0 9/3/2020  No  Sig - Route: Take 1 tablet by mouth every 4 (four) hours as needed for Pain. - Oral  Sent to pharmacy as: oxyCODONE-acetaminophen (PERCOCET)  mg per tablet  Class: Normal  Earliest Fill Date: 9/3/2020  Notes to Pharmacy: Quantity prescribed more than 7 day supply? Yes, quantity medically necessary  Order: [id number]  Date/Time Signed: 9/3/2020 08:43        [facility]  [facility] [address] [facility] [address] [phone]    Pt can be reached at [phone]

## 2020-09-22 NOTE — TELEPHONE ENCOUNTER
----- Message from Paolo Arriola sent at 9/22/2020 11:53 AM CDT -----  Regarding: Patient advice  Contact: pt  Pt called in regards to medication refill     oxyCODONE-acetaminophen (PERCOCET)  mg per tablet 120 tablet 0 9/3/2020  No  Sig - Route: Take 1 tablet by mouth every 4 (four) hours as needed for Pain. - Oral  Sent to pharmacy as: oxyCODONE-acetaminophen (PERCOCET)  mg per tablet  Class: Normal  Earliest Fill Date: 9/3/2020  Notes to Pharmacy: Quantity prescribed more than 7 day supply? Yes, quantity medically necessary  Order: [id number]  Date/Time Signed: 9/3/2020 08:43        [facility]  [facility] [facility] [address] [phone]    Pt can be reached at [phone]

## 2020-09-30 DIAGNOSIS — I27.82 CHRONIC SEPTIC PULMONARY EMBOLISM WITH ACUTE COR PULMONALE: ICD-10-CM

## 2020-09-30 DIAGNOSIS — F43.20 ADJUSTMENT DISORDER, UNSPECIFIED TYPE: ICD-10-CM

## 2020-09-30 DIAGNOSIS — I26.01 CHRONIC SEPTIC PULMONARY EMBOLISM WITH ACUTE COR PULMONALE: ICD-10-CM

## 2020-09-30 NOTE — TELEPHONE ENCOUNTER
"Clarified with pt that Eliquis will more than likely be continued indefinitely. Will re-send prescription to Dr. Montgomery to send to [facility] as previous prescription was sent to pharmacy as "no print" and may not have gone through.  "

## 2020-09-30 NOTE — TELEPHONE ENCOUNTER
"----- Message from Xi Aparicio sent at 9/30/2020  4:19 PM CDT -----  Patient Assist    Name of caller: Nazanin   Contact Preference:  [phone]   Does Patient feel the need to see the MD today? No   Physician: Ulises HICKS MD   What is the nature of the call?    - pt was told by [facility] pharmacy that she will not be able to fill   a script for the apixaban (ELIQUIS) 5 mg Tab because the MD took   her off of it . Pt states that she was told otherwise and had   a indefinite time. Please call and assist     Additional Notes:   "Thank you for all that you do for our patients'"          "

## 2020-10-01 RX ORDER — FLUOXETINE HYDROCHLORIDE 40 MG/1
40 CAPSULE ORAL DAILY
Qty: 30 CAPSULE | Refills: 2 | Status: SHIPPED | OUTPATIENT
Start: 2020-10-01 | End: 2021-03-07

## 2020-10-12 ENCOUNTER — PATIENT MESSAGE (OUTPATIENT)
Dept: HEMATOLOGY/ONCOLOGY | Facility: CLINIC | Age: 40
End: 2020-10-12

## 2020-10-12 DIAGNOSIS — R11.0 NAUSEA: ICD-10-CM

## 2020-10-12 DIAGNOSIS — D57.40 SICKLE CELL BETA THALASSEMIA: ICD-10-CM

## 2020-10-12 DIAGNOSIS — D57.00 SICKLE-CELL DISEASE WITH PAIN: ICD-10-CM

## 2020-10-12 RX ORDER — PROMETHAZINE HYDROCHLORIDE 25 MG/1
25 TABLET ORAL EVERY 6 HOURS PRN
Qty: 30 TABLET | Refills: 2 | Status: CANCELLED | OUTPATIENT
Start: 2020-10-12

## 2020-10-12 RX ORDER — HYDROXYUREA 500 MG/1
1000 CAPSULE ORAL 2 TIMES DAILY
Qty: 120 CAPSULE | Refills: 5 | Status: SHIPPED | OUTPATIENT
Start: 2020-10-12 | End: 2020-12-07 | Stop reason: SDUPTHER

## 2020-10-12 RX ORDER — OXYCODONE AND ACETAMINOPHEN 10; 325 MG/1; MG/1
1 TABLET ORAL EVERY 4 HOURS PRN
Qty: 120 TABLET | Refills: 0 | Status: SHIPPED | OUTPATIENT
Start: 2020-10-12 | End: 2020-10-12 | Stop reason: SDUPTHER

## 2020-10-12 RX ORDER — PROMETHAZINE HYDROCHLORIDE 25 MG/1
25 TABLET ORAL EVERY 6 HOURS PRN
Qty: 30 TABLET | Refills: 2 | Status: SHIPPED | OUTPATIENT
Start: 2020-10-12 | End: 2020-11-19 | Stop reason: SDUPTHER

## 2020-10-12 RX ORDER — OXYCODONE AND ACETAMINOPHEN 10; 325 MG/1; MG/1
1 TABLET ORAL EVERY 4 HOURS PRN
Qty: 120 TABLET | Refills: 0 | Status: SHIPPED | OUTPATIENT
Start: 2020-10-12 | End: 2020-10-30 | Stop reason: SDUPTHER

## 2020-10-12 RX ORDER — HYDROXYUREA 500 MG/1
1000 CAPSULE ORAL 2 TIMES DAILY
Qty: 120 CAPSULE | Refills: 5 | Status: CANCELLED | OUTPATIENT
Start: 2020-10-12

## 2020-10-12 RX ORDER — OXYCODONE AND ACETAMINOPHEN 10; 325 MG/1; MG/1
1 TABLET ORAL EVERY 4 HOURS PRN
Qty: 120 TABLET | Refills: 0 | OUTPATIENT
Start: 2020-10-12

## 2020-10-12 NOTE — TELEPHONE ENCOUNTER
"----- Message from Xi Aparicio sent at 10/12/2020  9:59 AM CDT -----  Regarding: refill  Patient Assist    Name of caller: Nazanin   Contact Preference:  [phone]   Does Patient feel the need to see the MD today? No   Physician:   What is the nature of the call?    -  oxyCODONE-acetaminophen (PERCOCET)  mg per tablet   -  promethazine (PHENERGAN) 25 MG tablet  -  hydroxyurea (HYDREA) 500 mg Cap   Pharmacy   [facility]  Additional Notes:   "Thank you for all that you do for our patients'"          "

## 2020-10-13 ENCOUNTER — TELEPHONE (OUTPATIENT)
Dept: PHARMACY | Facility: CLINIC | Age: 40
End: 2020-10-13

## 2020-10-30 ENCOUNTER — PATIENT MESSAGE (OUTPATIENT)
Dept: HEMATOLOGY/ONCOLOGY | Facility: CLINIC | Age: 40
End: 2020-10-30

## 2020-10-30 DIAGNOSIS — D57.40 SICKLE CELL BETA THALASSEMIA: ICD-10-CM

## 2020-10-30 RX ORDER — OXYCODONE AND ACETAMINOPHEN 10; 325 MG/1; MG/1
1 TABLET ORAL EVERY 4 HOURS PRN
Qty: 120 TABLET | Refills: 0 | Status: SHIPPED | OUTPATIENT
Start: 2020-10-30 | End: 2020-10-31 | Stop reason: SDUPTHER

## 2020-10-30 NOTE — TELEPHONE ENCOUNTER
----- Message from Ricardo Quintanilla sent at 10/30/2020  9:43 AM CDT -----  Regarding: Refill          Pt calling to speak with Dr regarding refill, in a lot of pain.  (PERCOCET)           [phone]

## 2020-10-31 DIAGNOSIS — D57.40 SICKLE CELL BETA THALASSEMIA: ICD-10-CM

## 2020-10-31 RX ORDER — OXYCODONE AND ACETAMINOPHEN 10; 325 MG/1; MG/1
1 TABLET ORAL EVERY 4 HOURS PRN
Qty: 120 TABLET | Refills: 0 | Status: SHIPPED | OUTPATIENT
Start: 2020-10-31 | End: 2020-11-19 | Stop reason: SDUPTHER

## 2020-11-13 ENCOUNTER — TELEPHONE (OUTPATIENT)
Dept: HEMATOLOGY/ONCOLOGY | Facility: CLINIC | Age: 40
End: 2020-11-13

## 2020-11-13 ENCOUNTER — INFUSION (OUTPATIENT)
Dept: INFUSION THERAPY | Facility: HOSPITAL | Age: 40
End: 2020-11-13
Attending: INTERNAL MEDICINE
Payer: MEDICAID

## 2020-11-13 VITALS
TEMPERATURE: 99 F | RESPIRATION RATE: 18 BRPM | WEIGHT: 293 LBS | BODY MASS INDEX: 57.78 KG/M2 | SYSTOLIC BLOOD PRESSURE: 141 MMHG | DIASTOLIC BLOOD PRESSURE: 87 MMHG | HEART RATE: 84 BPM

## 2020-11-13 DIAGNOSIS — D57.00 HB-SS DISEASE WITH VASO-OCCLUSIVE PAIN: Primary | ICD-10-CM

## 2020-11-13 PROCEDURE — 63600175 PHARM REV CODE 636 W HCPCS: Performed by: NURSE PRACTITIONER

## 2020-11-13 PROCEDURE — 96374 THER/PROPH/DIAG INJ IV PUSH: CPT

## 2020-11-13 PROCEDURE — 96375 TX/PRO/DX INJ NEW DRUG ADDON: CPT

## 2020-11-13 PROCEDURE — 25000003 PHARM REV CODE 250: Performed by: NURSE PRACTITIONER

## 2020-11-13 PROCEDURE — 96361 HYDRATE IV INFUSION ADD-ON: CPT

## 2020-11-13 RX ORDER — MORPHINE SULFATE 2 MG/ML
2 INJECTION, SOLUTION INTRAMUSCULAR; INTRAVENOUS ONCE AS NEEDED
Status: COMPLETED | OUTPATIENT
Start: 2020-11-13 | End: 2020-11-13

## 2020-11-13 RX ORDER — SODIUM CHLORIDE 0.9 % (FLUSH) 0.9 %
10 SYRINGE (ML) INJECTION
Status: DISCONTINUED | OUTPATIENT
Start: 2020-11-13 | End: 2020-11-13 | Stop reason: HOSPADM

## 2020-11-13 RX ORDER — DIPHENHYDRAMINE HYDROCHLORIDE 50 MG/ML
25 INJECTION INTRAMUSCULAR; INTRAVENOUS ONCE
Status: COMPLETED | OUTPATIENT
Start: 2020-11-13 | End: 2020-11-13

## 2020-11-13 RX ORDER — DIPHENHYDRAMINE HYDROCHLORIDE 50 MG/ML
25 INJECTION INTRAMUSCULAR; INTRAVENOUS ONCE
Status: CANCELLED | OUTPATIENT
Start: 2020-11-13

## 2020-11-13 RX ORDER — MORPHINE SULFATE 2 MG/ML
2 INJECTION, SOLUTION INTRAMUSCULAR; INTRAVENOUS ONCE AS NEEDED
Status: CANCELLED | OUTPATIENT
Start: 2020-11-13

## 2020-11-13 RX ORDER — SODIUM CHLORIDE 0.9 % (FLUSH) 0.9 %
10 SYRINGE (ML) INJECTION
Status: CANCELLED | OUTPATIENT
Start: 2020-11-13

## 2020-11-13 RX ORDER — HEPARIN 100 UNIT/ML
500 SYRINGE INTRAVENOUS
Status: CANCELLED | OUTPATIENT
Start: 2020-11-13

## 2020-11-13 RX ORDER — HEPARIN 100 UNIT/ML
500 SYRINGE INTRAVENOUS
Status: DISCONTINUED | OUTPATIENT
Start: 2020-11-13 | End: 2020-11-13 | Stop reason: HOSPADM

## 2020-11-13 RX ORDER — MORPHINE SULFATE 2 MG/ML
3 INJECTION, SOLUTION INTRAMUSCULAR; INTRAVENOUS ONCE
Status: COMPLETED | OUTPATIENT
Start: 2020-11-13 | End: 2020-11-13

## 2020-11-13 RX ORDER — MORPHINE SULFATE 2 MG/ML
3 INJECTION, SOLUTION INTRAMUSCULAR; INTRAVENOUS ONCE
Status: CANCELLED | OUTPATIENT
Start: 2020-11-13

## 2020-11-13 RX ADMIN — MORPHINE SULFATE 2 MG: 2 INJECTION, SOLUTION INTRAMUSCULAR; INTRAVENOUS at 03:11

## 2020-11-13 RX ADMIN — SODIUM CHLORIDE 1000 ML: 0.9 INJECTION, SOLUTION INTRAVENOUS at 02:11

## 2020-11-13 RX ADMIN — MORPHINE SULFATE 3 MG: 2 INJECTION, SOLUTION INTRAMUSCULAR; INTRAVENOUS at 02:11

## 2020-11-13 RX ADMIN — DIPHENHYDRAMINE HYDROCHLORIDE 25 MG: 50 INJECTION INTRAMUSCULAR; INTRAVENOUS at 02:11

## 2020-11-13 NOTE — TELEPHONE ENCOUNTER
----- Message from Jarett Euceda sent at 11/13/2020  8:06 AM CST -----  Contact: Patient  Patient Advice/Staff Message     Reason for call: Pt wants to speak with the RN, states she's in severe pain. Wants to know if she can come in today for a pain med infusion.     Do you feel you need to be seen today:: Yes        Communication Preference: [phone]    Additional Information:

## 2020-11-13 NOTE — TELEPHONE ENCOUNTER
Pt reporting 10/10 bilateral leg pain that began this morning. Set pt up for fluids and pain medicine this afternoon 11/13. Advised pt to go to the ED if pain persists through the weekend. Pt verbalized understanding, was appreciative of call. No other questions or concerns stated at this time.

## 2020-11-19 DIAGNOSIS — D57.40 SICKLE CELL BETA THALASSEMIA: ICD-10-CM

## 2020-11-19 DIAGNOSIS — R11.0 NAUSEA: ICD-10-CM

## 2020-11-19 RX ORDER — OXYCODONE AND ACETAMINOPHEN 10; 325 MG/1; MG/1
1 TABLET ORAL EVERY 4 HOURS PRN
Qty: 120 TABLET | Refills: 0 | Status: SHIPPED | OUTPATIENT
Start: 2020-11-19 | End: 2020-12-07 | Stop reason: SDUPTHER

## 2020-11-19 RX ORDER — PROMETHAZINE HYDROCHLORIDE 25 MG/1
25 TABLET ORAL EVERY 6 HOURS PRN
Qty: 30 TABLET | Refills: 2 | Status: SHIPPED | OUTPATIENT
Start: 2020-11-19 | End: 2021-05-02 | Stop reason: SDUPTHER

## 2020-11-19 NOTE — TELEPHONE ENCOUNTER
----- Message from Jarett Euceda sent at 11/19/2020  9:55 AM CST -----  Contact: Patient  Refill or New Rx: Refill     RX Name and Strength:  oxyCODONE-acetaminophen (PERCOCET)  mg per tablet   promethazine (PHENERGAN) 25 MG tablet      Preferred Pharmacy with phone number: [facility]      Additional Information:

## 2020-11-30 ENCOUNTER — INFUSION (OUTPATIENT)
Dept: INFUSION THERAPY | Facility: HOSPITAL | Age: 40
End: 2020-11-30
Payer: MEDICAID

## 2020-11-30 VITALS
RESPIRATION RATE: 16 BRPM | HEART RATE: 73 BPM | SYSTOLIC BLOOD PRESSURE: 146 MMHG | OXYGEN SATURATION: 97 % | TEMPERATURE: 99 F | DIASTOLIC BLOOD PRESSURE: 81 MMHG

## 2020-11-30 DIAGNOSIS — D57.00 HB-SS DISEASE WITH VASO-OCCLUSIVE PAIN: Primary | ICD-10-CM

## 2020-11-30 DIAGNOSIS — D57.00 SICKLE-CELL DISEASE WITH PAIN: ICD-10-CM

## 2020-11-30 DIAGNOSIS — D57.439 SICKLE CELL DISEASE, TYPE S BETA-ZERO THALASSEMIA WITH CRISIS: ICD-10-CM

## 2020-11-30 DIAGNOSIS — Z91.89 WITNESS TO SUICIDE: ICD-10-CM

## 2020-11-30 DIAGNOSIS — D50.9 IRON DEFICIENCY ANEMIA, UNSPECIFIED IRON DEFICIENCY ANEMIA TYPE: ICD-10-CM

## 2020-11-30 PROCEDURE — 96361 HYDRATE IV INFUSION ADD-ON: CPT

## 2020-11-30 PROCEDURE — 96374 THER/PROPH/DIAG INJ IV PUSH: CPT

## 2020-11-30 PROCEDURE — 96375 TX/PRO/DX INJ NEW DRUG ADDON: CPT

## 2020-11-30 PROCEDURE — 63600175 PHARM REV CODE 636 W HCPCS: Performed by: NURSE PRACTITIONER

## 2020-11-30 PROCEDURE — 96376 TX/PRO/DX INJ SAME DRUG ADON: CPT

## 2020-11-30 PROCEDURE — 96360 HYDRATION IV INFUSION INIT: CPT

## 2020-11-30 PROCEDURE — 25000003 PHARM REV CODE 250: Performed by: NURSE PRACTITIONER

## 2020-11-30 RX ORDER — MORPHINE SULFATE 2 MG/ML
1 INJECTION, SOLUTION INTRAMUSCULAR; INTRAVENOUS ONCE
Status: COMPLETED | OUTPATIENT
Start: 2020-11-30 | End: 2020-11-30

## 2020-11-30 RX ORDER — SODIUM CHLORIDE 0.9 % (FLUSH) 0.9 %
10 SYRINGE (ML) INJECTION
Status: DISCONTINUED | OUTPATIENT
Start: 2020-11-30 | End: 2020-11-30 | Stop reason: HOSPADM

## 2020-11-30 RX ORDER — DIPHENHYDRAMINE HYDROCHLORIDE 50 MG/ML
25 INJECTION INTRAMUSCULAR; INTRAVENOUS ONCE
Status: CANCELLED | OUTPATIENT
Start: 2020-11-30

## 2020-11-30 RX ORDER — SODIUM CHLORIDE 0.9 % (FLUSH) 0.9 %
10 SYRINGE (ML) INJECTION
Status: CANCELLED | OUTPATIENT
Start: 2020-11-30

## 2020-11-30 RX ORDER — HEPARIN 100 UNIT/ML
500 SYRINGE INTRAVENOUS
Status: DISCONTINUED | OUTPATIENT
Start: 2020-11-30 | End: 2020-11-30 | Stop reason: HOSPADM

## 2020-11-30 RX ORDER — MORPHINE SULFATE 2 MG/ML
2 INJECTION, SOLUTION INTRAMUSCULAR; INTRAVENOUS ONCE AS NEEDED
Status: CANCELLED | OUTPATIENT
Start: 2020-11-30

## 2020-11-30 RX ORDER — DIPHENHYDRAMINE HYDROCHLORIDE 50 MG/ML
25 INJECTION INTRAMUSCULAR; INTRAVENOUS ONCE
Status: COMPLETED | OUTPATIENT
Start: 2020-11-30 | End: 2020-11-30

## 2020-11-30 RX ORDER — MORPHINE SULFATE 2 MG/ML
2 INJECTION, SOLUTION INTRAMUSCULAR; INTRAVENOUS ONCE AS NEEDED
Status: COMPLETED | OUTPATIENT
Start: 2020-11-30 | End: 2020-11-30

## 2020-11-30 RX ORDER — HEPARIN 100 UNIT/ML
500 SYRINGE INTRAVENOUS
Status: CANCELLED | OUTPATIENT
Start: 2020-11-30

## 2020-11-30 RX ADMIN — SODIUM CHLORIDE 1000 ML: 0.9 INJECTION, SOLUTION INTRAVENOUS at 04:11

## 2020-11-30 RX ADMIN — MORPHINE SULFATE 2 MG: 2 INJECTION, SOLUTION INTRAMUSCULAR; INTRAVENOUS at 04:11

## 2020-11-30 RX ADMIN — MORPHINE SULFATE 1 MG: 2 INJECTION, SOLUTION INTRAMUSCULAR; INTRAVENOUS at 05:11

## 2020-11-30 RX ADMIN — DIPHENHYDRAMINE HYDROCHLORIDE 25 MG: 50 INJECTION INTRAMUSCULAR; INTRAVENOUS at 04:11

## 2020-11-30 NOTE — NURSING
1741 Dr. Ames up to infusion suite at patient chairside to discuss patient recent issues. Pt to have future consultation establish on Wednesday 12/7 prior to hematology follow up.

## 2020-11-30 NOTE — PLAN OF CARE
1600 Pt arrived for hydration and pain management. Pt ambulatory to chair. She reports that she is feeling very poorly. On arrival c/p BLE and hip pain 10/10. She denies any faint feeling, CP, palpitations, SOB. She states that she has been weak/fatigued and excessively thirsty. On arrival, B/P hypertensive 200s/100s. Could be pain related. On stress assessment, pt reports that she has been dealing w/ a lot. Reports that she is mentally burnt out. Recently witnessed her 16 yr-old daughter attempt suicide and Code, needing to perform CPR. Per pt reports daughter overdosed on medications. Daughter has been revived and currently undergoing psychiatric care. Concerned contributing to some of patients symptoms and crises. Offered to place psychology referral for further assessment, patient interested. Patient reclined in chair. Reviewed meds, hx, axs, labs, tx plan in detail. PIV initiated to L hand, flushing easily, +blood return. Blankets/snacks offered. [facility] pt closely.

## 2020-11-30 NOTE — PLAN OF CARE
1805 Pt completed and tolerated IVFs and pain relief w/o any issues. Pain still lingering. Pt aware to report to ED if exacerbates over the weekend. B/P much improved since arrival, pt to take antihypertensives when she gets home. Hydration encouraged. PIV removed, catheter tip intact, pressure dressing applied. AVS provided and discussed. Pt ambulatory out of infusion independently. Mother to provide transportation home.

## 2020-11-30 NOTE — NURSING
1710 spoke w/ Tiffani as patient is continuing to have pain. Initially 10/10, currently 8/10. Per Tiffani, NP okay to give an additional 1mg Morphine to patient at this time. Will administer and re-assess following injection.

## 2020-11-30 NOTE — NURSING
Spoke w/ Tiffani Ge NP to notify her of patient b/p, most likely contributed to pain, not taking antihypertensives and stress to make sure no further intervention needed at this time. Pain medication administered in hopes to improve pain and subsequently b/p. Plan to trend closely.   Patient instructed to take her B/P medications as soon as she gets home, as she does not have them currently with her.

## 2020-12-01 ENCOUNTER — DOCUMENTATION ONLY (OUTPATIENT)
Dept: PSYCHIATRY | Facility: CLINIC | Age: 40
End: 2020-12-01

## 2020-12-01 NOTE — PROGRESS NOTES
Brief contact with patient on 11/30/20 at the request of nurse Tran due to patient's expression of recent stressful/traumatic life experiences. Patient briefly discussed coping (with difficulty) and self-care (taking drives by self for relaxation). She does have good social support. NO self-harm fears. She does wish to follow up with me and will see me on 12/2/20 8 am.  SUSAN Ames, PhD

## 2020-12-02 ENCOUNTER — TELEPHONE (OUTPATIENT)
Dept: PSYCHIATRY | Facility: CLINIC | Age: 40
End: 2020-12-02

## 2020-12-02 NOTE — TELEPHONE ENCOUNTER
Patient agreed on 12/1/20 to transition from in person to televisit but did not check in and was unable to be reached by phone at the time of today's visit.   SUSAN Ames, PhD

## 2020-12-03 ENCOUNTER — TELEPHONE (OUTPATIENT)
Dept: HEMATOLOGY/ONCOLOGY | Facility: CLINIC | Age: 40
End: 2020-12-03

## 2020-12-03 DIAGNOSIS — D57.1 HB-SS DISEASE WITHOUT CRISIS: ICD-10-CM

## 2020-12-03 DIAGNOSIS — G50.0 TRIGEMINAL NEURALGIA: ICD-10-CM

## 2020-12-03 RX ORDER — GABAPENTIN 400 MG/1
800 CAPSULE ORAL 3 TIMES DAILY
Qty: 180 CAPSULE | Refills: 0 | Status: SHIPPED | OUTPATIENT
Start: 2020-12-03 | End: 2021-01-11

## 2020-12-03 NOTE — TELEPHONE ENCOUNTER
"----- Message from Xi Aparicio sent at 12/3/2020  8:33 AM CST -----  Patient Assist    Name of caller: Nazanin   Contact Preference:  [phone]  Does Patient feel the need to see the MD today? No   Physician: Ulises HICKS MD   What is the nature of the call?    - missed the lab appt yesterday and asked to r/s if possible for tomorrow     Additional Notes:   "Thank you for all that you do for our patients'"          "

## 2020-12-04 NOTE — TELEPHONE ENCOUNTER
"----- Message from Patricia Melo sent at 12/4/2020  2:57 PM CST -----  Reschedule Existing Appointment    Appt Date: 12/4 & 12/07  Type of appt :  NON FASTING LAB [[id number]] & ESTABLISHED PATIENT [[id number]]  Physician: Maggie White NP  Reason for rescheduling? Pt would like to reschedule labs and establish to a later timeframe on 12/7. Please contact to discuss availability   Caller:  Nazanin   Contact Preference: [phone]    Additional Information:  "Thank you for all that you do for our patients'"       "

## 2020-12-07 ENCOUNTER — TELEPHONE (OUTPATIENT)
Dept: INFUSION THERAPY | Facility: HOSPITAL | Age: 40
End: 2020-12-07

## 2020-12-07 ENCOUNTER — LAB VISIT (OUTPATIENT)
Dept: LAB | Facility: HOSPITAL | Age: 40
End: 2020-12-07
Attending: INTERNAL MEDICINE
Payer: MEDICAID

## 2020-12-07 ENCOUNTER — OFFICE VISIT (OUTPATIENT)
Dept: HEMATOLOGY/ONCOLOGY | Facility: CLINIC | Age: 40
End: 2020-12-07
Payer: MEDICAID

## 2020-12-07 VITALS
HEIGHT: 62 IN | BODY MASS INDEX: 53.92 KG/M2 | HEART RATE: 111 BPM | SYSTOLIC BLOOD PRESSURE: 147 MMHG | OXYGEN SATURATION: 99 % | RESPIRATION RATE: 16 BRPM | WEIGHT: 293 LBS | TEMPERATURE: 99 F | DIASTOLIC BLOOD PRESSURE: 78 MMHG

## 2020-12-07 DIAGNOSIS — D50.0 IRON DEFICIENCY ANEMIA DUE TO CHRONIC BLOOD LOSS: Primary | ICD-10-CM

## 2020-12-07 DIAGNOSIS — M25.561 ACUTE PAIN OF RIGHT KNEE: ICD-10-CM

## 2020-12-07 DIAGNOSIS — I26.99 PULMONARY EMBOLISM, UNSPECIFIED CHRONICITY, UNSPECIFIED PULMONARY EMBOLISM TYPE, UNSPECIFIED WHETHER ACUTE COR PULMONALE PRESENT: ICD-10-CM

## 2020-12-07 DIAGNOSIS — D57.00 SICKLE-CELL DISEASE WITH PAIN: ICD-10-CM

## 2020-12-07 DIAGNOSIS — D57.40 SICKLE CELL BETA THALASSEMIA: ICD-10-CM

## 2020-12-07 DIAGNOSIS — I10 ESSENTIAL HYPERTENSION: ICD-10-CM

## 2020-12-07 DIAGNOSIS — D50.0 IRON DEFICIENCY ANEMIA DUE TO CHRONIC BLOOD LOSS: ICD-10-CM

## 2020-12-07 DIAGNOSIS — D57.1 HB-SS DISEASE WITHOUT CRISIS: ICD-10-CM

## 2020-12-07 LAB
ABO + RH BLD: NORMAL
ALBUMIN SERPL BCP-MCNC: 3.9 G/DL (ref 3.5–5.2)
ALP SERPL-CCNC: 88 U/L (ref 55–135)
ALT SERPL W/O P-5'-P-CCNC: 20 U/L (ref 10–44)
ANION GAP SERPL CALC-SCNC: 13 MMOL/L (ref 8–16)
AST SERPL-CCNC: 21 U/L (ref 10–40)
BASOPHILS # BLD AUTO: 0.03 K/UL (ref 0–0.2)
BASOPHILS NFR BLD: 0.4 % (ref 0–1.9)
BILIRUB SERPL-MCNC: 0.4 MG/DL (ref 0.1–1)
BLD GP AB SCN CELLS X3 SERPL QL: NORMAL
BUN SERPL-MCNC: 10 MG/DL (ref 6–20)
CALCIUM SERPL-MCNC: 9.4 MG/DL (ref 8.7–10.5)
CHLORIDE SERPL-SCNC: 102 MMOL/L (ref 95–110)
CO2 SERPL-SCNC: 26 MMOL/L (ref 23–29)
CREAT SERPL-MCNC: 0.9 MG/DL (ref 0.5–1.4)
DIFFERENTIAL METHOD: ABNORMAL
EOSINOPHIL # BLD AUTO: 0.2 K/UL (ref 0–0.5)
EOSINOPHIL NFR BLD: 2.7 % (ref 0–8)
ERYTHROCYTE [DISTWIDTH] IN BLOOD BY AUTOMATED COUNT: 19.4 % (ref 11.5–14.5)
EST. GFR  (AFRICAN AMERICAN): >60 ML/MIN/1.73 M^2
EST. GFR  (NON AFRICAN AMERICAN): >60 ML/MIN/1.73 M^2
FERRITIN SERPL-MCNC: 8 NG/ML (ref 20–300)
FOLATE SERPL-MCNC: 7 NG/ML (ref 4–24)
GLUCOSE SERPL-MCNC: 134 MG/DL (ref 70–110)
HCT VFR BLD AUTO: 35.7 % (ref 37–48.5)
HGB BLD-MCNC: 11.5 G/DL (ref 12–16)
IMM GRANULOCYTES # BLD AUTO: 0.01 K/UL (ref 0–0.04)
IMM GRANULOCYTES NFR BLD AUTO: 0.1 % (ref 0–0.5)
IRON SERPL-MCNC: 44 UG/DL (ref 30–160)
LYMPHOCYTES # BLD AUTO: 2.8 K/UL (ref 1–4.8)
LYMPHOCYTES NFR BLD: 38.5 % (ref 18–48)
MCH RBC QN AUTO: 25 PG (ref 27–31)
MCHC RBC AUTO-ENTMCNC: 32.2 G/DL (ref 32–36)
MCV RBC AUTO: 78 FL (ref 82–98)
MONOCYTES # BLD AUTO: 0.6 K/UL (ref 0.3–1)
MONOCYTES NFR BLD: 8.3 % (ref 4–15)
NEUTROPHILS # BLD AUTO: 3.6 K/UL (ref 1.8–7.7)
NEUTROPHILS NFR BLD: 50 % (ref 38–73)
NRBC BLD-RTO: 0 /100 WBC
PLATELET # BLD AUTO: 266 K/UL (ref 150–350)
PMV BLD AUTO: 10 FL (ref 9.2–12.9)
POTASSIUM SERPL-SCNC: 3.6 MMOL/L (ref 3.5–5.1)
PROT SERPL-MCNC: 8.6 G/DL (ref 6–8.4)
RBC # BLD AUTO: 4.6 M/UL (ref 4–5.4)
SATURATED IRON: 8 % (ref 20–50)
SODIUM SERPL-SCNC: 141 MMOL/L (ref 136–145)
TOTAL IRON BINDING CAPACITY: 586 UG/DL (ref 250–450)
TRANSFERRIN SERPL-MCNC: 396 MG/DL (ref 200–375)
WBC # BLD AUTO: 7.15 K/UL (ref 3.9–12.7)

## 2020-12-07 PROCEDURE — 99215 OFFICE O/P EST HI 40 MIN: CPT | Mod: S$PBB,,, | Performed by: NURSE PRACTITIONER

## 2020-12-07 PROCEDURE — 99999 PR PBB SHADOW E&M-EST. PATIENT-LVL IV: CPT | Mod: PBBFAC,,, | Performed by: NURSE PRACTITIONER

## 2020-12-07 PROCEDURE — 82746 ASSAY OF FOLIC ACID SERUM: CPT

## 2020-12-07 PROCEDURE — 99214 OFFICE O/P EST MOD 30 MIN: CPT | Mod: PBBFAC,25 | Performed by: NURSE PRACTITIONER

## 2020-12-07 PROCEDURE — 99215 PR OFFICE/OUTPT VISIT, EST, LEVL V, 40-54 MIN: ICD-10-PCS | Mod: S$PBB,,, | Performed by: NURSE PRACTITIONER

## 2020-12-07 PROCEDURE — 83540 ASSAY OF IRON: CPT

## 2020-12-07 PROCEDURE — 99999 PR PBB SHADOW E&M-EST. PATIENT-LVL IV: ICD-10-PCS | Mod: PBBFAC,,, | Performed by: NURSE PRACTITIONER

## 2020-12-07 PROCEDURE — 85025 COMPLETE CBC W/AUTO DIFF WBC: CPT

## 2020-12-07 PROCEDURE — 82728 ASSAY OF FERRITIN: CPT

## 2020-12-07 PROCEDURE — 80053 COMPREHEN METABOLIC PANEL: CPT

## 2020-12-07 PROCEDURE — 86901 BLOOD TYPING SEROLOGIC RH(D): CPT

## 2020-12-07 PROCEDURE — 36415 COLL VENOUS BLD VENIPUNCTURE: CPT

## 2020-12-07 RX ORDER — OXYCODONE AND ACETAMINOPHEN 10; 325 MG/1; MG/1
1 TABLET ORAL EVERY 4 HOURS PRN
Qty: 120 TABLET | Refills: 0 | Status: ON HOLD | OUTPATIENT
Start: 2020-12-07 | End: 2020-12-26 | Stop reason: SDUPTHER

## 2020-12-07 RX ORDER — CARVEDILOL 25 MG/1
25 TABLET ORAL 2 TIMES DAILY
Qty: 60 TABLET | Refills: 11 | Status: SHIPPED | OUTPATIENT
Start: 2020-12-07 | End: 2022-03-02

## 2020-12-07 RX ORDER — HYDROXYUREA 500 MG/1
1000 CAPSULE ORAL 2 TIMES DAILY
Qty: 120 CAPSULE | Refills: 5 | Status: SHIPPED | OUTPATIENT
Start: 2020-12-07 | End: 2021-02-01 | Stop reason: SDUPTHER

## 2020-12-07 RX ORDER — DICLOFENAC SODIUM 10 MG/G
2 GEL TOPICAL 4 TIMES DAILY
Qty: 150 G | Refills: 2 | Status: ON HOLD | OUTPATIENT
Start: 2020-12-07 | End: 2020-12-22

## 2020-12-07 NOTE — Clinical Note
Please schedule patient for injectafer once approved, therapy plan entered. Please call patient to schedule. thanks

## 2020-12-07 NOTE — PROGRESS NOTES
SECTION OF HEMATOLOGY AND BONE MARROW TRANSPLANT  Return Patient Visit   2020    HISTORY OF PRESENT ILLNESS:   40 y.o. female ss disease presents today for routine 3 month follow-up. She normally suffers with chronic intermittent BLE edema and intermittent pain to LE during crises. Patient presents today with worsening pain to right leg at the knee area. She states she fell on the leg in 2020 and the leg didn't hurt at the time but started having pain a few day ago when the weather turned cold. Denies any redness or swelling. Has had multiple US in the past to r/o DVT negative. She is already on Eliquis for history of PE. She states she was going to go to the ER but she was fearful about getting exposed to CoVID. Currently having right hip pain and ankle pain as well. She also reports feeling extremely fatigued and tired as she has in the past when her iron has been low.       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 She has never used smokeless tobacco. She reports current drug use. Drug: Marijuana. She reports that she does not drink alcohol.    FAMILY HISTORY:  Family History   Problem Relation Age of Onset    Heart disease Mother     Diabetes Mother     Heart disease Father     Breast cancer Neg Hx     Ovarian cancer Neg Hx      Colon cancer Neg Hx        CURRENT MEDICATIONS:   Current Outpatient Medications   Medication Sig    albuterol (VENTOLIN HFA) 90 mcg/actuation inhaler Inhale 2 puffs into the lungs every 6 (six) hours as needed for Wheezing or Shortness of Breath. Rescue    albuterol-ipratropium (DUO-NEB) 2.5 mg-0.5 mg/3 mL nebulizer solution Take 3 mLs by nebulization every 6 (six) hours as needed for Wheezing or Shortness of Breath. Rescue    amLODIPine (NORVASC) 10 MG tablet TAKE 1 TABLET BY MOUTH EVERY DAY    apixaban (ELIQUIS) 5 mg Tab Take 1 tablet (5 mg total) by mouth 2 (two) times daily.    ascorbic acid (VITAMIN C ORAL) Take 1 capsule by mouth once daily.    carBAMazepine (TEGRETOL) 200 mg tablet Take 4 tablets (800 mg total) by mouth 2 (two) times daily.    carvediloL (COREG) 25 MG tablet Take 1 tablet (25 mg total) by mouth 2 (two) times daily.    cholecalciferol, vitamin D3, (VITAMIN D3 ORAL) Take 1 capsule by mouth once daily.    cyclobenzaprine (FLEXERIL) 10 MG tablet TAKE 1 TABLET BY MOUTH THREE TIMES A DAY AS NEEDED    FLUoxetine 40 MG capsule Take 1 capsule (40 mg total) by mouth once daily.    fluticasone propionate (FLONASE) 50 mcg/actuation nasal spray 1 SPRAY (50 MCG TOTAL) BY EACH NOSTRIL ROUTE ONCE DAILY.    fluticasone-salmeterol diskus inhaler 100-50 mcg Inhale 1 puff into the lungs once daily. Controller    folic acid (FOLVITE) 400 MCG tablet Take 400 mcg by mouth once daily.    gabapentin (NEURONTIN) 400 MG capsule TAKE 2 CAPSULES (800 MG TOTAL) BY MOUTH 3 (THREE) TIMES DAILY.    hydroCHLOROthiazide (HYDRODIURIL) 25 MG tablet TAKE 1 TABLET BY MOUTH EVERY DAY    hydroxyurea (HYDREA) 500 mg Cap Take 2 capsules (1,000 mg total) by mouth 2 (two) times daily.    oxyCODONE-acetaminophen (PERCOCET)  mg per tablet Take 1 tablet by mouth every 4 (four) hours as needed for Pain.    promethazine (PHENERGAN) 25 MG tablet Take 1 tablet (25 mg total) by mouth every 6 (six) hours as needed for  "Nausea.    senna-docusate 8.6-50 mg (PERICOLACE) 8.6-50 mg per tablet Take 1 tablet by mouth 2 (two) times daily as needed for Constipation. (Patient taking differently: Take 1 tablet by mouth once daily. )     No current facility-administered medications for this visit.      ALLERGIES:   Review of patient's allergies indicates:  No Known Allergies      REVIEW OF SYSTEMS:   Review of Systems   Constitutional: Negative for chills, fever and malaise/fatigue.   HENT: Negative for nosebleeds.    Eyes: Negative for blurred vision and discharge.   Respiratory: Negative for cough and shortness of breath.    Cardiovascular: Positive for leg swelling. Negative for chest pain and palpitations.   Gastrointestinal: Negative for abdominal pain, blood in stool, constipation, diarrhea, nausea and vomiting.   Genitourinary: Negative for dysuria, frequency and hematuria.   Musculoskeletal: Negative for back pain.   Skin: Negative for rash.   Neurological: Negative for tingling and weakness.   Psychiatric/Behavioral: The patient is not nervous/anxious.        PHYSICAL EXAM:   Vitals:    12/07/20 1606   BP: (Abnormal) 147/78   Pulse: (Abnormal) 111   Resp: 16   Temp: 99.1 °F (37.3 °C)   TempSrc: Oral   SpO2: 99%   Weight: (Abnormal) 146.5 kg (322 lb 13.8 oz)   Height: 5' 2" (1.575 m)   PainSc:   8   PainLoc: Leg     Limited due to audio visit    ECOG Performance Status: (foot note - ECOG PS provided by Eastern Cooperative Oncology Group) 1 - Symptomatic but completely ambulatory    Karnofsky Performance Score:  90%- Able to Carry on Normal Activity: Minor Symptoms of Disease  DATA:   Lab Results   Component Value Date    WBC 7.15 12/07/2020    HGB 11.5 (L) 12/07/2020    HCT 35.7 (L) 12/07/2020    MCV 78 (L) 12/07/2020     12/07/2020       Gran # (ANC)   Date Value Ref Range Status   12/07/2020 3.6 1.8 - 7.7 K/uL Final     Gran %   Date Value Ref Range Status   12/07/2020 50.0 38.0 - 73.0 % Final     CMP  Sodium   Date Value " Ref Range Status   06/11/2020 139 136 - 145 mmol/L Final     Potassium   Date Value Ref Range Status   06/11/2020 3.7 3.5 - 5.1 mmol/L Final     Chloride   Date Value Ref Range Status   06/11/2020 102 95 - 110 mmol/L Final     CO2   Date Value Ref Range Status   06/11/2020 30 (H) 23 - 29 mmol/L Final     Glucose   Date Value Ref Range Status   06/11/2020 81 70 - 110 mg/dL Final     BUN   Date Value Ref Range Status   06/11/2020 8 6 - 20 mg/dL Final     Creatinine   Date Value Ref Range Status   06/11/2020 0.8 0.5 - 1.4 mg/dL Final     Calcium   Date Value Ref Range Status   06/11/2020 8.8 8.7 - 10.5 mg/dL Final     Total Protein   Date Value Ref Range Status   06/11/2020 7.2 6.0 - 8.4 g/dL Final     Albumin   Date Value Ref Range Status   06/11/2020 3.2 (L) 3.5 - 5.2 g/dL Final     Total Bilirubin   Date Value Ref Range Status   06/11/2020 0.4 0.1 - 1.0 mg/dL Final     Comment:     For infants and newborns, interpretation of results should be based  on gestational age, weight and in agreement with clinical  observations.  Premature Infant recommended reference ranges:  Up to 24 hours.............<8.0 mg/dL  Up to 48 hours............<12.0 mg/dL  3-5 days..................<15.0 mg/dL  6-29 days.................<15.0 mg/dL       Alkaline Phosphatase   Date Value Ref Range Status   06/11/2020 70 55 - 135 U/L Final     AST   Date Value Ref Range Status   06/11/2020 15 10 - 40 U/L Final     ALT   Date Value Ref Range Status   06/11/2020 7 (L) 10 - 44 U/L Final     Anion Gap   Date Value Ref Range Status   06/11/2020 7 (L) 8 - 16 mmol/L Final     eGFR if    Date Value Ref Range Status   06/11/2020 >60.0 >60 mL/min/1.73 m^2 Final     eGFR if non    Date Value Ref Range Status   06/11/2020 >60.0 >60 mL/min/1.73 m^2 Final     Comment:     Calculation used to obtain the estimated glomerular filtration  rate (eGFR) is the CKD-EPI equation.        CTA  Chest  - 6/8/20  Impression:     Artifact is  present diminishing the sensitivity of the examination, and there is no large central saddle type pulmonary embolus however multiple intraluminal filling defects within the segmental pulmonary arteries bilaterally are noted, and even when accounting for artifact the appearance is most concerning for pulmonary emboli.     Patchy ground-glass opacities within the bilateral lower lung zones as well as more confluent consolidation within the right lower lobe.  Findings may relate to evolving pulmonary infarcts, infection/pneumonia, edema, hemorrhage, or combination of the aforementioned etiologies.     This report was flagged in Epic as abnormal.     Dr. Rey reported the findings to Dr. Dalton prior to dication.     Electronically signed by resident: Patric Rey  Date:                                            06/08/2020  Time:                                           04:49     Electronically signed by: Burton Jc  Date:                                            06/08/2020    ASSESSMENT AND PLAN:   Encounter Diagnoses   Name Primary?    Iron deficiency anemia due to chronic blood loss Yes    Pulmonary embolism, unspecified chronicity, unspecified pulmonary embolism type, unspecified whether acute cor pulmonale present        Sickle Cell Disease Monitoring   1)Hydrea  - previously 1-3 crises a year; per patient with increasing severity and frequency over last 2-3 years   - initiated hydrea 500mg BID (5/22/17) but she quickly self discontinued due to mild rash and refused to restart. Restarted in January 2020; increased to 1000 mg bid which she continues and reports compliance  - discussed l-glutamine with Dr. Montgomery in the past but insurance refused to cover.  - IVF PRN in infusion center prn   - Percocet PRN, refill sent today  - patient reports nausea due to hydrea, continues on Phenergan PRN    2)Iron status   - history of iron deficiency from menorrhagnia, likely contributing to  "fatigue/anemia  - feraheme given 1/2020; ferritin, hgb normalized aug 2020   - ferritin 8 today, unable to tolerate po iron. Will schedule injectafer weekly x 2     3)AVN  - has chronic bilateral hip pain; stable  - consider obtaining MRI if pain worsens    4)HTN  - continues norvasc 10; takes nightly  - continues hctz to 25mg daily (8/20/18); takes nightly  - Coreg 12.5 mg BID start today (5/21/19); increased to 25mg bid given htn; careful attn to asthma exacerbating  - recommend fu in IM clinic for chronic mgmt     5)Opthalmic  - continue follow-up with ophthalmology     6)Pain  - continue home percocet 10 q 4 hrs, refilled today     7)CardioPulmonary  - states had normal TTE jan 2019; next due jan 2021  - continue inhalers     8)Renal  - April 2019 UA with no proteinuria; past due recheck next visit      9)Neuro/CVA  - previously gave anecdotal history of subclinical stroke with no deficits  - will monitor     10)Vaccines   - receiving HIB series, menactra, prevnar followed by pneumovax    - needs next PNA vaccine 23 valent--received 13 valent in 2017    11)Contraception  - she has had tubal ligation     12)PE  - Provoked but in setting obesity, SCD, and immobility so think benefit of anticoagulation continuing outweighs risk of bleeding  - continues 5mg eliquis BID, high risk patient, likely to continue indefinitely    13) Knee pain  - patient reports fall in June 2020 with no pain after  - denies pain until recently when "weather turned cold"  - Percocet refilled  - offered x-ray, patient states he 14 yr old daughter is in the car, she will consider this in the future  - pain is at the knee area, possibly arthritis, will trial Voltaren gel       Follow Up:  Labs (CBC, CMP, retic, ferritin, iron/tibc) and follow-up in 3 months      Maggie White NP  Hematology/Oncology/BMT             "

## 2020-12-08 RX ORDER — SODIUM CHLORIDE 9 MG/ML
INJECTION, SOLUTION INTRAVENOUS CONTINUOUS
Status: CANCELLED | OUTPATIENT
Start: 2020-12-15

## 2020-12-08 RX ORDER — EPINEPHRINE 0.3 MG/.3ML
0.3 INJECTION SUBCUTANEOUS ONCE AS NEEDED
Status: CANCELLED | OUTPATIENT
Start: 2020-12-15

## 2020-12-08 RX ORDER — METHYLPREDNISOLONE SOD SUCC 125 MG
125 VIAL (EA) INJECTION ONCE AS NEEDED
Status: CANCELLED | OUTPATIENT
Start: 2020-12-15

## 2020-12-08 RX ORDER — DIPHENHYDRAMINE HYDROCHLORIDE 50 MG/ML
50 INJECTION INTRAMUSCULAR; INTRAVENOUS ONCE AS NEEDED
Status: CANCELLED | OUTPATIENT
Start: 2020-12-15

## 2020-12-08 RX ORDER — HEPARIN 100 UNIT/ML
5 SYRINGE INTRAVENOUS
Status: CANCELLED | OUTPATIENT
Start: 2020-12-15

## 2020-12-08 RX ORDER — SODIUM CHLORIDE 0.9 % (FLUSH) 0.9 %
10 SYRINGE (ML) INJECTION
Status: CANCELLED | OUTPATIENT
Start: 2020-12-15

## 2020-12-16 RX ORDER — HEPARIN 100 UNIT/ML
500 SYRINGE INTRAVENOUS
Status: CANCELLED | OUTPATIENT
Start: 2020-12-17

## 2020-12-16 RX ORDER — MORPHINE SULFATE 2 MG/ML
2 INJECTION, SOLUTION INTRAMUSCULAR; INTRAVENOUS ONCE AS NEEDED
Status: CANCELLED | OUTPATIENT
Start: 2020-12-17

## 2020-12-16 RX ORDER — SODIUM CHLORIDE 0.9 % (FLUSH) 0.9 %
10 SYRINGE (ML) INJECTION
Status: CANCELLED | OUTPATIENT
Start: 2020-12-17

## 2020-12-16 RX ORDER — DIPHENHYDRAMINE HYDROCHLORIDE 50 MG/ML
25 INJECTION INTRAMUSCULAR; INTRAVENOUS ONCE
Status: CANCELLED | OUTPATIENT
Start: 2020-12-17

## 2020-12-17 ENCOUNTER — INFUSION (OUTPATIENT)
Dept: INFUSION THERAPY | Facility: HOSPITAL | Age: 40
End: 2020-12-17
Attending: INTERNAL MEDICINE
Payer: MEDICAID

## 2020-12-17 VITALS
DIASTOLIC BLOOD PRESSURE: 70 MMHG | TEMPERATURE: 98 F | OXYGEN SATURATION: 99 % | SYSTOLIC BLOOD PRESSURE: 136 MMHG | RESPIRATION RATE: 18 BRPM | HEART RATE: 89 BPM

## 2020-12-17 DIAGNOSIS — D57.00 HB-SS DISEASE WITH VASO-OCCLUSIVE PAIN: ICD-10-CM

## 2020-12-17 DIAGNOSIS — D50.0 IRON DEFICIENCY ANEMIA DUE TO CHRONIC BLOOD LOSS: Primary | ICD-10-CM

## 2020-12-17 PROCEDURE — 96361 HYDRATE IV INFUSION ADD-ON: CPT

## 2020-12-17 PROCEDURE — 63600175 PHARM REV CODE 636 W HCPCS: Performed by: NURSE PRACTITIONER

## 2020-12-17 PROCEDURE — 96360 HYDRATION IV INFUSION INIT: CPT

## 2020-12-17 PROCEDURE — 25000003 PHARM REV CODE 250: Performed by: NURSE PRACTITIONER

## 2020-12-17 PROCEDURE — 96376 TX/PRO/DX INJ SAME DRUG ADON: CPT

## 2020-12-17 PROCEDURE — 96375 TX/PRO/DX INJ NEW DRUG ADDON: CPT

## 2020-12-17 PROCEDURE — 63600175 PHARM REV CODE 636 W HCPCS: Mod: JG | Performed by: NURSE PRACTITIONER

## 2020-12-17 PROCEDURE — 96365 THER/PROPH/DIAG IV INF INIT: CPT

## 2020-12-17 RX ORDER — DIPHENHYDRAMINE HYDROCHLORIDE 50 MG/ML
25 INJECTION INTRAMUSCULAR; INTRAVENOUS ONCE
Status: COMPLETED | OUTPATIENT
Start: 2020-12-17 | End: 2020-12-17

## 2020-12-17 RX ORDER — EPINEPHRINE 0.3 MG/.3ML
0.3 INJECTION SUBCUTANEOUS ONCE AS NEEDED
Status: CANCELLED | OUTPATIENT
Start: 2020-12-24

## 2020-12-17 RX ORDER — SODIUM CHLORIDE 0.9 % (FLUSH) 0.9 %
10 SYRINGE (ML) INJECTION
Status: DISCONTINUED | OUTPATIENT
Start: 2020-12-17 | End: 2020-12-17 | Stop reason: HOSPADM

## 2020-12-17 RX ORDER — SODIUM CHLORIDE 9 MG/ML
INJECTION, SOLUTION INTRAVENOUS CONTINUOUS
Status: CANCELLED | OUTPATIENT
Start: 2020-12-24

## 2020-12-17 RX ORDER — HEPARIN 100 UNIT/ML
5 SYRINGE INTRAVENOUS
Status: DISCONTINUED | OUTPATIENT
Start: 2020-12-17 | End: 2020-12-17 | Stop reason: HOSPADM

## 2020-12-17 RX ORDER — MORPHINE SULFATE 2 MG/ML
2 INJECTION, SOLUTION INTRAMUSCULAR; INTRAVENOUS ONCE AS NEEDED
Status: COMPLETED | OUTPATIENT
Start: 2020-12-17 | End: 2020-12-17

## 2020-12-17 RX ORDER — SODIUM CHLORIDE 9 MG/ML
INJECTION, SOLUTION INTRAVENOUS CONTINUOUS
Status: DISCONTINUED | OUTPATIENT
Start: 2020-12-17 | End: 2020-12-17 | Stop reason: HOSPADM

## 2020-12-17 RX ORDER — HEPARIN 100 UNIT/ML
500 SYRINGE INTRAVENOUS
Status: DISCONTINUED | OUTPATIENT
Start: 2020-12-17 | End: 2020-12-17 | Stop reason: HOSPADM

## 2020-12-17 RX ORDER — METHYLPREDNISOLONE SOD SUCC 125 MG
125 VIAL (EA) INJECTION ONCE AS NEEDED
Status: DISCONTINUED | OUTPATIENT
Start: 2020-12-17 | End: 2020-12-17 | Stop reason: HOSPADM

## 2020-12-17 RX ORDER — DIPHENHYDRAMINE HYDROCHLORIDE 50 MG/ML
50 INJECTION INTRAMUSCULAR; INTRAVENOUS ONCE AS NEEDED
Status: DISCONTINUED | OUTPATIENT
Start: 2020-12-17 | End: 2020-12-17 | Stop reason: HOSPADM

## 2020-12-17 RX ORDER — SODIUM CHLORIDE 0.9 % (FLUSH) 0.9 %
10 SYRINGE (ML) INJECTION
Status: CANCELLED | OUTPATIENT
Start: 2020-12-24

## 2020-12-17 RX ORDER — EPINEPHRINE 0.3 MG/.3ML
0.3 INJECTION SUBCUTANEOUS ONCE AS NEEDED
Status: DISCONTINUED | OUTPATIENT
Start: 2020-12-17 | End: 2020-12-17 | Stop reason: HOSPADM

## 2020-12-17 RX ORDER — DIPHENHYDRAMINE HYDROCHLORIDE 50 MG/ML
50 INJECTION INTRAMUSCULAR; INTRAVENOUS ONCE AS NEEDED
Status: CANCELLED | OUTPATIENT
Start: 2020-12-24

## 2020-12-17 RX ORDER — METHYLPREDNISOLONE SOD SUCC 125 MG
125 VIAL (EA) INJECTION ONCE AS NEEDED
Status: CANCELLED | OUTPATIENT
Start: 2020-12-24

## 2020-12-17 RX ORDER — HEPARIN 100 UNIT/ML
5 SYRINGE INTRAVENOUS
Status: CANCELLED | OUTPATIENT
Start: 2020-12-24

## 2020-12-17 RX ORDER — MORPHINE SULFATE 2 MG/ML
2 INJECTION, SOLUTION INTRAMUSCULAR; INTRAVENOUS ONCE
Status: COMPLETED | OUTPATIENT
Start: 2020-12-17 | End: 2020-12-17

## 2020-12-17 RX ADMIN — FERRIC CARBOXYMALTOSE INJECTION 750 MG: 50 INJECTION, SOLUTION INTRAVENOUS at 03:12

## 2020-12-17 RX ADMIN — SODIUM CHLORIDE 1000 ML: 0.9 INJECTION, SOLUTION INTRAVENOUS at 02:12

## 2020-12-17 RX ADMIN — MORPHINE SULFATE 2 MG: 2 INJECTION, SOLUTION INTRAMUSCULAR; INTRAVENOUS at 02:12

## 2020-12-17 RX ADMIN — DIPHENHYDRAMINE HYDROCHLORIDE 25 MG: 50 INJECTION INTRAMUSCULAR; INTRAVENOUS at 02:12

## 2020-12-17 RX ADMIN — MORPHINE SULFATE 2 MG: 2 INJECTION, SOLUTION INTRAMUSCULAR; INTRAVENOUS at 04:12

## 2020-12-17 NOTE — PLAN OF CARE
Pt admitted for Injectafer #1 and IVF and Pain meds. Pt received treatment, tolerated well. Pt did require 2nd dose of Morphine 2mg. Plan of care reviewed and Pt continued to c/o pain ot RT leg, will go to ED if pain intensify's this pm . Pt discharged @ 17:02

## 2020-12-18 DIAGNOSIS — D57.1 HB-SS DISEASE WITHOUT CRISIS: ICD-10-CM

## 2020-12-18 DIAGNOSIS — D57.00 HB-SS DISEASE WITH CRISIS: ICD-10-CM

## 2020-12-18 DIAGNOSIS — D50.0 IRON DEFICIENCY ANEMIA DUE TO CHRONIC BLOOD LOSS: Primary | ICD-10-CM

## 2020-12-21 ENCOUNTER — HOSPITAL ENCOUNTER (INPATIENT)
Facility: HOSPITAL | Age: 40
LOS: 6 days | Discharge: HOME | DRG: 812 | End: 2020-12-27
Attending: EMERGENCY MEDICINE | Admitting: INTERNAL MEDICINE
Payer: MEDICAID

## 2020-12-21 DIAGNOSIS — G89.29 CHRONIC PAIN OF RIGHT KNEE: Primary | ICD-10-CM

## 2020-12-21 DIAGNOSIS — M62.838 MUSCLE SPASM: ICD-10-CM

## 2020-12-21 DIAGNOSIS — D57.00 SICKLE CELL PAIN CRISIS: ICD-10-CM

## 2020-12-21 DIAGNOSIS — M25.561 ACUTE PAIN OF RIGHT KNEE: ICD-10-CM

## 2020-12-21 DIAGNOSIS — R94.31 QT PROLONGATION: ICD-10-CM

## 2020-12-21 DIAGNOSIS — M25.561 CHRONIC PAIN OF RIGHT KNEE: Primary | ICD-10-CM

## 2020-12-21 DIAGNOSIS — R07.9 CHEST PAIN: ICD-10-CM

## 2020-12-21 LAB
ALBUMIN SERPL BCP-MCNC: 3.8 G/DL (ref 3.5–5.2)
ALP SERPL-CCNC: 80 U/L (ref 55–135)
ALT SERPL W/O P-5'-P-CCNC: 10 U/L (ref 10–44)
ANION GAP SERPL CALC-SCNC: 9 MMOL/L (ref 8–16)
AST SERPL-CCNC: 14 U/L (ref 10–40)
BACTERIA #/AREA URNS AUTO: ABNORMAL /HPF
BASOPHILS # BLD AUTO: 0.02 K/UL (ref 0–0.2)
BASOPHILS NFR BLD: 0.4 % (ref 0–1.9)
BILIRUB SERPL-MCNC: 0.5 MG/DL (ref 0.1–1)
BILIRUB UR QL STRIP: NEGATIVE
BUN SERPL-MCNC: 10 MG/DL (ref 6–20)
CALCIUM SERPL-MCNC: 9.1 MG/DL (ref 8.7–10.5)
CHLORIDE SERPL-SCNC: 105 MMOL/L (ref 95–110)
CLARITY UR REFRACT.AUTO: CLEAR
CO2 SERPL-SCNC: 27 MMOL/L (ref 23–29)
COLOR UR AUTO: YELLOW
CREAT SERPL-MCNC: 0.8 MG/DL (ref 0.5–1.4)
CTP QC/QA: YES
DIFFERENTIAL METHOD: ABNORMAL
EOSINOPHIL # BLD AUTO: 0.1 K/UL (ref 0–0.5)
EOSINOPHIL NFR BLD: 2.5 % (ref 0–8)
ERYTHROCYTE [DISTWIDTH] IN BLOOD BY AUTOMATED COUNT: 20.7 % (ref 11.5–14.5)
EST. GFR  (AFRICAN AMERICAN): >60 ML/MIN/1.73 M^2
EST. GFR  (NON AFRICAN AMERICAN): >60 ML/MIN/1.73 M^2
GLUCOSE SERPL-MCNC: 91 MG/DL (ref 70–110)
GLUCOSE UR QL STRIP: NEGATIVE
HCT VFR BLD AUTO: 32.5 % (ref 37–48.5)
HGB BLD-MCNC: 10.7 G/DL (ref 12–16)
HGB UR QL STRIP: ABNORMAL
HYALINE CASTS UR QL AUTO: 1 /LPF
IMM GRANULOCYTES # BLD AUTO: 0.01 K/UL (ref 0–0.04)
IMM GRANULOCYTES NFR BLD AUTO: 0.2 % (ref 0–0.5)
KETONES UR QL STRIP: NEGATIVE
LDH SERPL L TO P-CCNC: 333 U/L (ref 110–260)
LEUKOCYTE ESTERASE UR QL STRIP: NEGATIVE
LYMPHOCYTES # BLD AUTO: 2 K/UL (ref 1–4.8)
LYMPHOCYTES NFR BLD: 38.5 % (ref 18–48)
MCH RBC QN AUTO: 25.3 PG (ref 27–31)
MCHC RBC AUTO-ENTMCNC: 32.9 G/DL (ref 32–36)
MCV RBC AUTO: 77 FL (ref 82–98)
MICROSCOPIC COMMENT: ABNORMAL
MONOCYTES # BLD AUTO: 0.4 K/UL (ref 0.3–1)
MONOCYTES NFR BLD: 7.1 % (ref 4–15)
NEUTROPHILS # BLD AUTO: 2.7 K/UL (ref 1.8–7.7)
NEUTROPHILS NFR BLD: 51.3 % (ref 38–73)
NITRITE UR QL STRIP: NEGATIVE
NRBC BLD-RTO: 0 /100 WBC
PH UR STRIP: 5 [PH] (ref 5–8)
PLATELET # BLD AUTO: 219 K/UL (ref 150–350)
PMV BLD AUTO: 9.4 FL (ref 9.2–12.9)
POTASSIUM SERPL-SCNC: 3.3 MMOL/L (ref 3.5–5.1)
PROT SERPL-MCNC: 8.1 G/DL (ref 6–8.4)
PROT UR QL STRIP: NEGATIVE
RBC # BLD AUTO: 4.23 M/UL (ref 4–5.4)
RBC #/AREA URNS AUTO: >100 /HPF (ref 0–4)
RETICS/RBC NFR AUTO: 2.4 % (ref 0.5–2.5)
SARS-COV-2 RDRP RESP QL NAA+PROBE: NEGATIVE
SODIUM SERPL-SCNC: 141 MMOL/L (ref 136–145)
SP GR UR STRIP: 1.01 (ref 1–1.03)
SQUAMOUS #/AREA URNS AUTO: 1 /HPF
URN SPEC COLLECT METH UR: ABNORMAL
WBC # BLD AUTO: 5.19 K/UL (ref 3.9–12.7)

## 2020-12-21 PROCEDURE — G0378 HOSPITAL OBSERVATION PER HR: HCPCS

## 2020-12-21 PROCEDURE — 81001 URINALYSIS AUTO W/SCOPE: CPT

## 2020-12-21 PROCEDURE — 63600175 PHARM REV CODE 636 W HCPCS: Performed by: PHYSICIAN ASSISTANT

## 2020-12-21 PROCEDURE — 99285 PR EMERGENCY DEPT VISIT,LEVEL V: ICD-10-PCS | Mod: ,,, | Performed by: PHYSICIAN ASSISTANT

## 2020-12-21 PROCEDURE — 96361 HYDRATE IV INFUSION ADD-ON: CPT

## 2020-12-21 PROCEDURE — 96375 TX/PRO/DX INJ NEW DRUG ADDON: CPT

## 2020-12-21 PROCEDURE — 96374 THER/PROPH/DIAG INJ IV PUSH: CPT

## 2020-12-21 PROCEDURE — 83615 LACTATE (LD) (LDH) ENZYME: CPT

## 2020-12-21 PROCEDURE — 99285 EMERGENCY DEPT VISIT HI MDM: CPT | Mod: 25

## 2020-12-21 PROCEDURE — 96376 TX/PRO/DX INJ SAME DRUG ADON: CPT

## 2020-12-21 PROCEDURE — U0002 COVID-19 LAB TEST NON-CDC: HCPCS | Performed by: PHYSICIAN ASSISTANT

## 2020-12-21 PROCEDURE — 25000003 PHARM REV CODE 250: Performed by: PHYSICIAN ASSISTANT

## 2020-12-21 PROCEDURE — 80053 COMPREHEN METABOLIC PANEL: CPT

## 2020-12-21 PROCEDURE — 11000001 HC ACUTE MED/SURG PRIVATE ROOM

## 2020-12-21 PROCEDURE — 99285 EMERGENCY DEPT VISIT HI MDM: CPT | Mod: ,,, | Performed by: PHYSICIAN ASSISTANT

## 2020-12-21 PROCEDURE — 85025 COMPLETE CBC W/AUTO DIFF WBC: CPT

## 2020-12-21 PROCEDURE — 63600175 PHARM REV CODE 636 W HCPCS: Performed by: STUDENT IN AN ORGANIZED HEALTH CARE EDUCATION/TRAINING PROGRAM

## 2020-12-21 PROCEDURE — 85045 AUTOMATED RETICULOCYTE COUNT: CPT

## 2020-12-21 PROCEDURE — 25000003 PHARM REV CODE 250: Performed by: STUDENT IN AN ORGANIZED HEALTH CARE EDUCATION/TRAINING PROGRAM

## 2020-12-21 RX ORDER — SODIUM CHLORIDE 0.9 % (FLUSH) 0.9 %
10 SYRINGE (ML) INJECTION
Status: DISCONTINUED | OUTPATIENT
Start: 2020-12-21 | End: 2020-12-27 | Stop reason: HOSPADM

## 2020-12-21 RX ORDER — PROMETHAZINE HYDROCHLORIDE 25 MG/1
25 TABLET ORAL EVERY 6 HOURS PRN
Status: DISCONTINUED | OUTPATIENT
Start: 2020-12-21 | End: 2020-12-27 | Stop reason: HOSPADM

## 2020-12-21 RX ORDER — OXYCODONE AND ACETAMINOPHEN 10; 325 MG/1; MG/1
1 TABLET ORAL EVERY 4 HOURS PRN
Status: DISCONTINUED | OUTPATIENT
Start: 2020-12-21 | End: 2020-12-21

## 2020-12-21 RX ORDER — IBUPROFEN 200 MG
24 TABLET ORAL
Status: DISCONTINUED | OUTPATIENT
Start: 2020-12-21 | End: 2020-12-27 | Stop reason: HOSPADM

## 2020-12-21 RX ORDER — GABAPENTIN 400 MG/1
800 CAPSULE ORAL 3 TIMES DAILY
Status: DISCONTINUED | OUTPATIENT
Start: 2020-12-21 | End: 2020-12-27 | Stop reason: HOSPADM

## 2020-12-21 RX ORDER — HYDRALAZINE HYDROCHLORIDE 25 MG/1
25 TABLET, FILM COATED ORAL EVERY 8 HOURS PRN
Status: DISCONTINUED | OUTPATIENT
Start: 2020-12-21 | End: 2020-12-27 | Stop reason: HOSPADM

## 2020-12-21 RX ORDER — HYDROXYUREA 500 MG/1
1000 CAPSULE ORAL 2 TIMES DAILY
Status: DISCONTINUED | OUTPATIENT
Start: 2020-12-21 | End: 2020-12-27 | Stop reason: HOSPADM

## 2020-12-21 RX ORDER — FOLIC ACID 1 MG/1
1 TABLET ORAL DAILY
Status: DISCONTINUED | OUTPATIENT
Start: 2020-12-22 | End: 2020-12-27 | Stop reason: HOSPADM

## 2020-12-21 RX ORDER — HYDROMORPHONE HYDROCHLORIDE 1 MG/ML
1 INJECTION, SOLUTION INTRAMUSCULAR; INTRAVENOUS; SUBCUTANEOUS
Status: COMPLETED | OUTPATIENT
Start: 2020-12-21 | End: 2020-12-21

## 2020-12-21 RX ORDER — IBUPROFEN 200 MG
16 TABLET ORAL
Status: DISCONTINUED | OUTPATIENT
Start: 2020-12-21 | End: 2020-12-27 | Stop reason: HOSPADM

## 2020-12-21 RX ORDER — CARBAMAZEPINE 200 MG/1
800 TABLET ORAL 2 TIMES DAILY
Status: DISCONTINUED | OUTPATIENT
Start: 2020-12-21 | End: 2020-12-27 | Stop reason: HOSPADM

## 2020-12-21 RX ORDER — DIPHENHYDRAMINE HYDROCHLORIDE 50 MG/ML
25 INJECTION INTRAMUSCULAR; INTRAVENOUS EVERY 6 HOURS PRN
Status: DISCONTINUED | OUTPATIENT
Start: 2020-12-21 | End: 2020-12-22

## 2020-12-21 RX ORDER — ONDANSETRON 2 MG/ML
4 INJECTION INTRAMUSCULAR; INTRAVENOUS
Status: COMPLETED | OUTPATIENT
Start: 2020-12-21 | End: 2020-12-21

## 2020-12-21 RX ORDER — AMLODIPINE BESYLATE 10 MG/1
10 TABLET ORAL DAILY
Status: DISCONTINUED | OUTPATIENT
Start: 2020-12-22 | End: 2020-12-27 | Stop reason: HOSPADM

## 2020-12-21 RX ORDER — CARVEDILOL 25 MG/1
25 TABLET ORAL 2 TIMES DAILY
Status: DISCONTINUED | OUTPATIENT
Start: 2020-12-21 | End: 2020-12-27 | Stop reason: HOSPADM

## 2020-12-21 RX ORDER — SODIUM CHLORIDE, SODIUM LACTATE, POTASSIUM CHLORIDE, CALCIUM CHLORIDE 600; 310; 30; 20 MG/100ML; MG/100ML; MG/100ML; MG/100ML
INJECTION, SOLUTION INTRAVENOUS CONTINUOUS
Status: ACTIVE | OUTPATIENT
Start: 2020-12-21 | End: 2020-12-22

## 2020-12-21 RX ORDER — POLYETHYLENE GLYCOL 3350 17 G/17G
17 POWDER, FOR SOLUTION ORAL DAILY
Status: DISCONTINUED | OUTPATIENT
Start: 2020-12-22 | End: 2020-12-27 | Stop reason: HOSPADM

## 2020-12-21 RX ORDER — POTASSIUM CHLORIDE 20 MEQ/1
40 TABLET, EXTENDED RELEASE ORAL
Status: COMPLETED | OUTPATIENT
Start: 2020-12-21 | End: 2020-12-21

## 2020-12-21 RX ORDER — HYDROMORPHONE HYDROCHLORIDE 1 MG/ML
2 INJECTION, SOLUTION INTRAMUSCULAR; INTRAVENOUS; SUBCUTANEOUS EVERY 6 HOURS PRN
Status: DISCONTINUED | OUTPATIENT
Start: 2020-12-22 | End: 2020-12-22

## 2020-12-21 RX ORDER — HYDROCHLOROTHIAZIDE 25 MG/1
25 TABLET ORAL DAILY
Status: DISCONTINUED | OUTPATIENT
Start: 2020-12-22 | End: 2020-12-22

## 2020-12-21 RX ORDER — HYDROMORPHONE HYDROCHLORIDE 1 MG/ML
2 INJECTION, SOLUTION INTRAMUSCULAR; INTRAVENOUS; SUBCUTANEOUS EVERY 4 HOURS PRN
Status: DISCONTINUED | OUTPATIENT
Start: 2020-12-21 | End: 2020-12-21

## 2020-12-21 RX ORDER — FOLIC ACID 0.4 MG
400 TABLET ORAL DAILY
Status: DISCONTINUED | OUTPATIENT
Start: 2020-12-22 | End: 2020-12-21

## 2020-12-21 RX ORDER — DIPHENHYDRAMINE HCL 25 MG
25 CAPSULE ORAL
Status: COMPLETED | OUTPATIENT
Start: 2020-12-21 | End: 2020-12-21

## 2020-12-21 RX ORDER — CYCLOBENZAPRINE HCL 10 MG
10 TABLET ORAL 3 TIMES DAILY PRN
Status: DISCONTINUED | OUTPATIENT
Start: 2020-12-21 | End: 2020-12-24

## 2020-12-21 RX ORDER — AMOXICILLIN 250 MG
1 CAPSULE ORAL DAILY
Status: DISCONTINUED | OUTPATIENT
Start: 2020-12-22 | End: 2020-12-27 | Stop reason: HOSPADM

## 2020-12-21 RX ORDER — OXYCODONE AND ACETAMINOPHEN 10; 325 MG/1; MG/1
1 TABLET ORAL EVERY 6 HOURS PRN
Status: DISCONTINUED | OUTPATIENT
Start: 2020-12-21 | End: 2020-12-22

## 2020-12-21 RX ORDER — OXYCODONE HYDROCHLORIDE 5 MG/1
10 TABLET ORAL EVERY 4 HOURS PRN
Status: DISCONTINUED | OUTPATIENT
Start: 2020-12-21 | End: 2020-12-21

## 2020-12-21 RX ORDER — MORPHINE SULFATE 2 MG/ML
6 INJECTION, SOLUTION INTRAMUSCULAR; INTRAVENOUS
Status: COMPLETED | OUTPATIENT
Start: 2020-12-21 | End: 2020-12-21

## 2020-12-21 RX ORDER — IPRATROPIUM BROMIDE AND ALBUTEROL SULFATE 2.5; .5 MG/3ML; MG/3ML
3 SOLUTION RESPIRATORY (INHALATION)
Status: DISCONTINUED | OUTPATIENT
Start: 2020-12-21 | End: 2020-12-25

## 2020-12-21 RX ORDER — GLUCAGON 1 MG
1 KIT INJECTION
Status: DISCONTINUED | OUTPATIENT
Start: 2020-12-21 | End: 2020-12-27 | Stop reason: HOSPADM

## 2020-12-21 RX ORDER — FLUOXETINE HYDROCHLORIDE 20 MG/1
40 CAPSULE ORAL DAILY
Status: DISCONTINUED | OUTPATIENT
Start: 2020-12-22 | End: 2020-12-27 | Stop reason: HOSPADM

## 2020-12-21 RX ADMIN — POTASSIUM CHLORIDE 40 MEQ: 1500 TABLET, EXTENDED RELEASE ORAL at 09:12

## 2020-12-21 RX ADMIN — HYDROXYUREA 1000 MG: 500 CAPSULE ORAL at 09:12

## 2020-12-21 RX ADMIN — HYDROMORPHONE HYDROCHLORIDE 2 MG: 1 INJECTION, SOLUTION INTRAMUSCULAR; INTRAVENOUS; SUBCUTANEOUS at 08:12

## 2020-12-21 RX ADMIN — CYCLOBENZAPRINE 10 MG: 10 TABLET, FILM COATED ORAL at 09:12

## 2020-12-21 RX ADMIN — APIXABAN 5 MG: 5 TABLET, FILM COATED ORAL at 09:12

## 2020-12-21 RX ADMIN — DIPHENHYDRAMINE HYDROCHLORIDE 25 MG: 25 CAPSULE ORAL at 06:12

## 2020-12-21 RX ADMIN — SODIUM CHLORIDE, SODIUM LACTATE, POTASSIUM CHLORIDE, AND CALCIUM CHLORIDE: .6; .31; .03; .02 INJECTION, SOLUTION INTRAVENOUS at 09:12

## 2020-12-21 RX ADMIN — SODIUM CHLORIDE 1000 ML: 0.9 INJECTION, SOLUTION INTRAVENOUS at 07:12

## 2020-12-21 RX ADMIN — CARBAMAZEPINE 800 MG: 200 TABLET ORAL at 09:12

## 2020-12-21 RX ADMIN — SODIUM CHLORIDE 1000 ML: 0.9 INJECTION, SOLUTION INTRAVENOUS at 06:12

## 2020-12-21 RX ADMIN — HYDROMORPHONE HYDROCHLORIDE 1 MG: 1 INJECTION, SOLUTION INTRAMUSCULAR; INTRAVENOUS; SUBCUTANEOUS at 07:12

## 2020-12-21 RX ADMIN — CARVEDILOL 25 MG: 25 TABLET, FILM COATED ORAL at 09:12

## 2020-12-21 RX ADMIN — MORPHINE SULFATE 6 MG: 2 INJECTION, SOLUTION INTRAMUSCULAR; INTRAVENOUS at 06:12

## 2020-12-21 RX ADMIN — GABAPENTIN 800 MG: 400 CAPSULE ORAL at 09:12

## 2020-12-21 RX ADMIN — DIPHENHYDRAMINE HYDROCHLORIDE 25 MG: 50 INJECTION, SOLUTION INTRAMUSCULAR; INTRAVENOUS at 08:12

## 2020-12-21 RX ADMIN — ONDANSETRON 4 MG: 2 INJECTION INTRAMUSCULAR; INTRAVENOUS at 06:12

## 2020-12-21 RX ADMIN — OXYCODONE HYDROCHLORIDE AND ACETAMINOPHEN 1 TABLET: 10; 325 TABLET ORAL at 11:12

## 2020-12-21 RX ADMIN — POTASSIUM CHLORIDE 40 MEQ: 1500 TABLET, EXTENDED RELEASE ORAL at 11:12

## 2020-12-22 PROBLEM — Z86.711 HISTORY OF PULMONARY EMBOLUS (PE): Status: ACTIVE | Noted: 2020-06-08

## 2020-12-22 PROBLEM — R94.31 QT PROLONGATION: Status: ACTIVE | Noted: 2020-12-22

## 2020-12-22 PROBLEM — Z75.8 DISCHARGE PLANNING ISSUES: Status: ACTIVE | Noted: 2020-12-22

## 2020-12-22 LAB
ABO + RH BLD: NORMAL
ALBUMIN SERPL BCP-MCNC: 2.6 G/DL (ref 3.5–5.2)
ALP SERPL-CCNC: 56 U/L (ref 55–135)
ALT SERPL W/O P-5'-P-CCNC: 8 U/L (ref 10–44)
ANION GAP SERPL CALC-SCNC: 9 MMOL/L (ref 8–16)
AST SERPL-CCNC: 28 U/L (ref 10–40)
BASOPHILS # BLD AUTO: 0.02 K/UL (ref 0–0.2)
BASOPHILS NFR BLD: 0.3 % (ref 0–1.9)
BILIRUB SERPL-MCNC: 0.5 MG/DL (ref 0.1–1)
BLD GP AB SCN CELLS X3 SERPL QL: NORMAL
BUN SERPL-MCNC: 10 MG/DL (ref 6–20)
CALCIUM SERPL-MCNC: 7.4 MG/DL (ref 8.7–10.5)
CHLORIDE SERPL-SCNC: 107 MMOL/L (ref 95–110)
CHOLEST SERPL-MCNC: 128 MG/DL (ref 120–199)
CHOLEST/HDLC SERPL: 5.1 {RATIO} (ref 2–5)
CK SERPL-CCNC: 104 U/L (ref 20–180)
CO2 SERPL-SCNC: 20 MMOL/L (ref 23–29)
CREAT SERPL-MCNC: 0.7 MG/DL (ref 0.5–1.4)
DIFFERENTIAL METHOD: ABNORMAL
EOSINOPHIL # BLD AUTO: 0.1 K/UL (ref 0–0.5)
EOSINOPHIL NFR BLD: 1.9 % (ref 0–8)
ERYTHROCYTE [DISTWIDTH] IN BLOOD BY AUTOMATED COUNT: 21.1 % (ref 11.5–14.5)
EST. GFR  (AFRICAN AMERICAN): >60 ML/MIN/1.73 M^2
EST. GFR  (NON AFRICAN AMERICAN): >60 ML/MIN/1.73 M^2
ESTIMATED AVG GLUCOSE: 91 MG/DL (ref 68–131)
FERRITIN SERPL-MCNC: 2979 NG/ML (ref 20–300)
FOLATE SERPL-MCNC: 5 NG/ML (ref 4–24)
GLUCOSE SERPL-MCNC: 92 MG/DL (ref 70–110)
HBA1C MFR BLD HPLC: 4.8 % (ref 4–5.6)
HCT VFR BLD AUTO: 25.7 % (ref 37–48.5)
HDLC SERPL-MCNC: 25 MG/DL (ref 40–75)
HDLC SERPL: 19.5 % (ref 20–50)
HGB BLD-MCNC: 8.1 G/DL (ref 12–16)
IMM GRANULOCYTES # BLD AUTO: 0.02 K/UL (ref 0–0.04)
IMM GRANULOCYTES NFR BLD AUTO: 0.3 % (ref 0–0.5)
IRON SERPL-MCNC: 190 UG/DL (ref 30–160)
LDLC SERPL CALC-MCNC: 84.6 MG/DL (ref 63–159)
LYMPHOCYTES # BLD AUTO: 1.6 K/UL (ref 1–4.8)
LYMPHOCYTES NFR BLD: 24.4 % (ref 18–48)
MAGNESIUM SERPL-MCNC: 1.4 MG/DL (ref 1.6–2.6)
MCH RBC QN AUTO: 25.5 PG (ref 27–31)
MCHC RBC AUTO-ENTMCNC: 31.5 G/DL (ref 32–36)
MCV RBC AUTO: 81 FL (ref 82–98)
MONOCYTES # BLD AUTO: 0.2 K/UL (ref 0.3–1)
MONOCYTES NFR BLD: 3.8 % (ref 4–15)
NEUTROPHILS # BLD AUTO: 4.4 K/UL (ref 1.8–7.7)
NEUTROPHILS NFR BLD: 69.3 % (ref 38–73)
NONHDLC SERPL-MCNC: 103 MG/DL
NRBC BLD-RTO: 2 /100 WBC
PHOSPHATE SERPL-MCNC: 2.4 MG/DL (ref 2.7–4.5)
PLATELET # BLD AUTO: 161 K/UL (ref 150–350)
PMV BLD AUTO: 9.2 FL (ref 9.2–12.9)
POTASSIUM SERPL-SCNC: 4.6 MMOL/L (ref 3.5–5.1)
PROT SERPL-MCNC: 5.7 G/DL (ref 6–8.4)
RBC # BLD AUTO: 3.18 M/UL (ref 4–5.4)
SATURATED IRON: 65 % (ref 20–50)
SODIUM SERPL-SCNC: 136 MMOL/L (ref 136–145)
TOTAL IRON BINDING CAPACITY: 292 UG/DL (ref 250–450)
TRANSFERRIN SERPL-MCNC: 197 MG/DL (ref 200–375)
TRIGL SERPL-MCNC: 92 MG/DL (ref 30–150)
WBC # BLD AUTO: 6.34 K/UL (ref 3.9–12.7)

## 2020-12-22 PROCEDURE — 85025 COMPLETE CBC W/AUTO DIFF WBC: CPT

## 2020-12-22 PROCEDURE — 83036 HEMOGLOBIN GLYCOSYLATED A1C: CPT

## 2020-12-22 PROCEDURE — 63600175 PHARM REV CODE 636 W HCPCS: Performed by: STUDENT IN AN ORGANIZED HEALTH CARE EDUCATION/TRAINING PROGRAM

## 2020-12-22 PROCEDURE — 83735 ASSAY OF MAGNESIUM: CPT

## 2020-12-22 PROCEDURE — 80053 COMPREHEN METABOLIC PANEL: CPT

## 2020-12-22 PROCEDURE — 94761 N-INVAS EAR/PLS OXIMETRY MLT: CPT

## 2020-12-22 PROCEDURE — 27000221 HC OXYGEN, UP TO 24 HOURS

## 2020-12-22 PROCEDURE — 25000242 PHARM REV CODE 250 ALT 637 W/ HCPCS: Performed by: STUDENT IN AN ORGANIZED HEALTH CARE EDUCATION/TRAINING PROGRAM

## 2020-12-22 PROCEDURE — 93010 ELECTROCARDIOGRAM REPORT: CPT | Mod: ,,, | Performed by: INTERNAL MEDICINE

## 2020-12-22 PROCEDURE — 84100 ASSAY OF PHOSPHORUS: CPT

## 2020-12-22 PROCEDURE — 96376 TX/PRO/DX INJ SAME DRUG ADON: CPT

## 2020-12-22 PROCEDURE — 82550 ASSAY OF CK (CPK): CPT

## 2020-12-22 PROCEDURE — 25500020 PHARM REV CODE 255: Performed by: STUDENT IN AN ORGANIZED HEALTH CARE EDUCATION/TRAINING PROGRAM

## 2020-12-22 PROCEDURE — 94770 HC EXHALED C02 TEST: CPT

## 2020-12-22 PROCEDURE — 80061 LIPID PANEL: CPT

## 2020-12-22 PROCEDURE — 36415 COLL VENOUS BLD VENIPUNCTURE: CPT

## 2020-12-22 PROCEDURE — 99223 1ST HOSP IP/OBS HIGH 75: CPT | Mod: ,,, | Performed by: STUDENT IN AN ORGANIZED HEALTH CARE EDUCATION/TRAINING PROGRAM

## 2020-12-22 PROCEDURE — 93010 EKG 12-LEAD: ICD-10-PCS | Mod: ,,, | Performed by: INTERNAL MEDICINE

## 2020-12-22 PROCEDURE — 25000003 PHARM REV CODE 250: Performed by: EMERGENCY MEDICINE

## 2020-12-22 PROCEDURE — 99223 PR INITIAL HOSPITAL CARE,LEVL III: ICD-10-PCS | Mod: ,,, | Performed by: STUDENT IN AN ORGANIZED HEALTH CARE EDUCATION/TRAINING PROGRAM

## 2020-12-22 PROCEDURE — 83540 ASSAY OF IRON: CPT

## 2020-12-22 PROCEDURE — 25000003 PHARM REV CODE 250: Performed by: STUDENT IN AN ORGANIZED HEALTH CARE EDUCATION/TRAINING PROGRAM

## 2020-12-22 PROCEDURE — 94640 AIRWAY INHALATION TREATMENT: CPT

## 2020-12-22 PROCEDURE — 99900035 HC TECH TIME PER 15 MIN (STAT)

## 2020-12-22 PROCEDURE — 82746 ASSAY OF FOLIC ACID SERUM: CPT

## 2020-12-22 PROCEDURE — 11000001 HC ACUTE MED/SURG PRIVATE ROOM

## 2020-12-22 PROCEDURE — A9585 GADOBUTROL INJECTION: HCPCS | Performed by: STUDENT IN AN ORGANIZED HEALTH CARE EDUCATION/TRAINING PROGRAM

## 2020-12-22 PROCEDURE — 82728 ASSAY OF FERRITIN: CPT

## 2020-12-22 PROCEDURE — 86900 BLOOD TYPING SEROLOGIC ABO: CPT

## 2020-12-22 PROCEDURE — 96361 HYDRATE IV INFUSION ADD-ON: CPT

## 2020-12-22 PROCEDURE — 93005 ELECTROCARDIOGRAM TRACING: CPT

## 2020-12-22 RX ORDER — DICLOFENAC SODIUM 10 MG/G
4 GEL TOPICAL 4 TIMES DAILY
Status: DISCONTINUED | OUTPATIENT
Start: 2020-12-22 | End: 2020-12-27 | Stop reason: HOSPADM

## 2020-12-22 RX ORDER — ACETAMINOPHEN 325 MG/1
650 TABLET ORAL EVERY 8 HOURS PRN
Status: DISCONTINUED | OUTPATIENT
Start: 2020-12-22 | End: 2020-12-24

## 2020-12-22 RX ORDER — ACETAMINOPHEN 500 MG
1000 TABLET ORAL DAILY PRN
COMMUNITY

## 2020-12-22 RX ORDER — HYDROMORPHONE HCL IN 0.9% NACL 6 MG/30 ML
PATIENT CONTROLLED ANALGESIA SYRINGE INTRAVENOUS CONTINUOUS
Status: DISCONTINUED | OUTPATIENT
Start: 2020-12-22 | End: 2020-12-26

## 2020-12-22 RX ORDER — DIPHENHYDRAMINE HCL 25 MG
25 CAPSULE ORAL EVERY 6 HOURS PRN
Status: DISCONTINUED | OUTPATIENT
Start: 2020-12-22 | End: 2020-12-27 | Stop reason: HOSPADM

## 2020-12-22 RX ORDER — DIPHENHYDRAMINE HYDROCHLORIDE 50 MG/ML
50 INJECTION INTRAMUSCULAR; INTRAVENOUS EVERY 12 HOURS PRN
Status: DISCONTINUED | OUTPATIENT
Start: 2020-12-22 | End: 2020-12-22

## 2020-12-22 RX ORDER — HYDROMORPHONE HYDROCHLORIDE 1 MG/ML
1 INJECTION, SOLUTION INTRAMUSCULAR; INTRAVENOUS; SUBCUTANEOUS ONCE
Status: DISCONTINUED | OUTPATIENT
Start: 2020-12-22 | End: 2020-12-22

## 2020-12-22 RX ORDER — HYDROMORPHONE HYDROCHLORIDE 2 MG/ML
2 INJECTION, SOLUTION INTRAMUSCULAR; INTRAVENOUS; SUBCUTANEOUS ONCE
Status: COMPLETED | OUTPATIENT
Start: 2020-12-22 | End: 2020-12-22

## 2020-12-22 RX ORDER — GADOBUTROL 604.72 MG/ML
10 INJECTION INTRAVENOUS
Status: COMPLETED | OUTPATIENT
Start: 2020-12-22 | End: 2020-12-22

## 2020-12-22 RX ORDER — NALOXONE HCL 0.4 MG/ML
0.02 VIAL (ML) INJECTION
Status: DISCONTINUED | OUTPATIENT
Start: 2020-12-22 | End: 2020-12-26

## 2020-12-22 RX ADMIN — POLYETHYLENE GLYCOL 3350 17 G: 17 POWDER, FOR SOLUTION ORAL at 10:12

## 2020-12-22 RX ADMIN — AMLODIPINE BESYLATE 10 MG: 10 TABLET ORAL at 10:12

## 2020-12-22 RX ADMIN — HYDROMORPHONE HYDROCHLORIDE 2 MG: 1 INJECTION, SOLUTION INTRAMUSCULAR; INTRAVENOUS; SUBCUTANEOUS at 12:12

## 2020-12-22 RX ADMIN — GABAPENTIN 800 MG: 400 CAPSULE ORAL at 10:12

## 2020-12-22 RX ADMIN — CYCLOBENZAPRINE 10 MG: 10 TABLET, FILM COATED ORAL at 09:12

## 2020-12-22 RX ADMIN — HYDROXYUREA 1000 MG: 500 CAPSULE ORAL at 09:12

## 2020-12-22 RX ADMIN — CARVEDILOL 25 MG: 25 TABLET, FILM COATED ORAL at 10:12

## 2020-12-22 RX ADMIN — CARBAMAZEPINE 800 MG: 200 TABLET ORAL at 12:12

## 2020-12-22 RX ADMIN — APIXABAN 5 MG: 5 TABLET, FILM COATED ORAL at 09:12

## 2020-12-22 RX ADMIN — SODIUM CHLORIDE, SODIUM LACTATE, POTASSIUM CHLORIDE, AND CALCIUM CHLORIDE: .6; .31; .03; .02 INJECTION, SOLUTION INTRAVENOUS at 05:12

## 2020-12-22 RX ADMIN — HYDROXYUREA 1000 MG: 500 CAPSULE ORAL at 10:12

## 2020-12-22 RX ADMIN — FLUOXETINE 40 MG: 20 CAPSULE ORAL at 10:12

## 2020-12-22 RX ADMIN — APIXABAN 5 MG: 5 TABLET, FILM COATED ORAL at 10:12

## 2020-12-22 RX ADMIN — CARVEDILOL 25 MG: 25 TABLET, FILM COATED ORAL at 09:12

## 2020-12-22 RX ADMIN — FOLIC ACID 1 MG: 1 TABLET ORAL at 10:12

## 2020-12-22 RX ADMIN — HYDROMORPHONE HYDROCHLORIDE 2 MG: 2 INJECTION INTRAMUSCULAR; INTRAVENOUS; SUBCUTANEOUS at 01:12

## 2020-12-22 RX ADMIN — Medication: at 12:12

## 2020-12-22 RX ADMIN — GADOBUTROL 10 ML: 604.72 INJECTION INTRAVENOUS at 06:12

## 2020-12-22 RX ADMIN — IPRATROPIUM BROMIDE AND ALBUTEROL SULFATE 3 ML: .5; 2.5 SOLUTION RESPIRATORY (INHALATION) at 10:12

## 2020-12-22 RX ADMIN — GABAPENTIN 800 MG: 400 CAPSULE ORAL at 09:12

## 2020-12-22 RX ADMIN — DIPHENHYDRAMINE HYDROCHLORIDE 25 MG: 50 INJECTION, SOLUTION INTRAMUSCULAR; INTRAVENOUS at 07:12

## 2020-12-22 RX ADMIN — SODIUM CHLORIDE, SODIUM LACTATE, POTASSIUM CHLORIDE, AND CALCIUM CHLORIDE: .6; .31; .03; .02 INJECTION, SOLUTION INTRAVENOUS at 03:12

## 2020-12-22 RX ADMIN — IPRATROPIUM BROMIDE AND ALBUTEROL SULFATE 3 ML: .5; 2.5 SOLUTION RESPIRATORY (INHALATION) at 07:12

## 2020-12-22 RX ADMIN — CARBAMAZEPINE 800 MG: 200 TABLET ORAL at 09:12

## 2020-12-22 RX ADMIN — OXYCODONE HYDROCHLORIDE AND ACETAMINOPHEN 1 TABLET: 10; 325 TABLET ORAL at 05:12

## 2020-12-22 RX ADMIN — HYDROMORPHONE HYDROCHLORIDE 2 MG: 1 INJECTION, SOLUTION INTRAMUSCULAR; INTRAVENOUS; SUBCUTANEOUS at 07:12

## 2020-12-22 RX ADMIN — DOCUSATE SODIUM 50MG AND SENNOSIDES 8.6MG 1 TABLET: 8.6; 5 TABLET, FILM COATED ORAL at 10:12

## 2020-12-22 RX ADMIN — GABAPENTIN 800 MG: 400 CAPSULE ORAL at 06:12

## 2020-12-23 PROBLEM — M25.561 RIGHT KNEE PAIN: Status: ACTIVE | Noted: 2020-12-23

## 2020-12-23 LAB
ALBUMIN SERPL BCP-MCNC: 3.3 G/DL (ref 3.5–5.2)
ALP SERPL-CCNC: 74 U/L (ref 55–135)
ALT SERPL W/O P-5'-P-CCNC: 16 U/L (ref 10–44)
ANION GAP SERPL CALC-SCNC: 11 MMOL/L (ref 8–16)
APPEARANCE FLD: CLEAR
AST SERPL-CCNC: 27 U/L (ref 10–40)
BASOPHILS # BLD AUTO: 0.02 K/UL (ref 0–0.2)
BASOPHILS NFR BLD: 0.4 % (ref 0–1.9)
BILIRUB SERPL-MCNC: 0.3 MG/DL (ref 0.1–1)
BODY FLD TYPE: NORMAL
BODY FLD TYPE: NORMAL
BUN SERPL-MCNC: 10 MG/DL (ref 6–20)
CALCIUM SERPL-MCNC: 8.3 MG/DL (ref 8.7–10.5)
CHLORIDE SERPL-SCNC: 106 MMOL/L (ref 95–110)
CO2 SERPL-SCNC: 22 MMOL/L (ref 23–29)
COLOR FLD: NORMAL
CREAT SERPL-MCNC: 0.9 MG/DL (ref 0.5–1.4)
CRP SERPL-MCNC: 39.6 MG/L (ref 0–8.2)
CRYSTALS FLD MICRO: NEGATIVE
DIFFERENTIAL METHOD: ABNORMAL
EOSINOPHIL # BLD AUTO: 0.2 K/UL (ref 0–0.5)
EOSINOPHIL NFR BLD: 3.8 % (ref 0–8)
EOSINOPHIL NFR FLD MANUAL: 2 %
ERYTHROCYTE [DISTWIDTH] IN BLOOD BY AUTOMATED COUNT: 21.4 % (ref 11.5–14.5)
ERYTHROCYTE [SEDIMENTATION RATE] IN BLOOD BY WESTERGREN METHOD: 27 MM/HR (ref 0–36)
EST. GFR  (AFRICAN AMERICAN): >60 ML/MIN/1.73 M^2
EST. GFR  (NON AFRICAN AMERICAN): >60 ML/MIN/1.73 M^2
GLUCOSE SERPL-MCNC: 116 MG/DL (ref 70–110)
GRAM STN SPEC: NORMAL
GRAM STN SPEC: NORMAL
HCT VFR BLD AUTO: 28.3 % (ref 37–48.5)
HGB BLD-MCNC: 8.9 G/DL (ref 12–16)
IMM GRANULOCYTES # BLD AUTO: 0.01 K/UL (ref 0–0.04)
IMM GRANULOCYTES NFR BLD AUTO: 0.2 % (ref 0–0.5)
LYMPHOCYTES # BLD AUTO: 2.3 K/UL (ref 1–4.8)
LYMPHOCYTES NFR BLD: 48.6 % (ref 18–48)
LYMPHOCYTES NFR FLD MANUAL: 51 %
MAGNESIUM SERPL-MCNC: 2 MG/DL (ref 1.6–2.6)
MCH RBC QN AUTO: 25.6 PG (ref 27–31)
MCHC RBC AUTO-ENTMCNC: 31.4 G/DL (ref 32–36)
MCV RBC AUTO: 81 FL (ref 82–98)
MONOCYTES # BLD AUTO: 0.4 K/UL (ref 0.3–1)
MONOCYTES NFR BLD: 8.6 % (ref 4–15)
MONOS+MACROS NFR FLD MANUAL: 14 %
NEUTROPHILS # BLD AUTO: 1.8 K/UL (ref 1.8–7.7)
NEUTROPHILS NFR BLD: 38.4 % (ref 38–73)
NEUTROPHILS NFR FLD MANUAL: 33 %
NRBC BLD-RTO: 1 /100 WBC
PATH INTERP FLD-IMP: NORMAL
PHOSPHATE SERPL-MCNC: 2.9 MG/DL (ref 2.7–4.5)
PLATELET # BLD AUTO: 159 K/UL (ref 150–350)
PMV BLD AUTO: 9 FL (ref 9.2–12.9)
POTASSIUM SERPL-SCNC: 4.7 MMOL/L (ref 3.5–5.1)
PROCALCITONIN SERPL IA-MCNC: 0.06 NG/ML
PROT SERPL-MCNC: 7.2 G/DL (ref 6–8.4)
RBC # BLD AUTO: 3.48 M/UL (ref 4–5.4)
RETICS/RBC NFR AUTO: 2.5 % (ref 0.5–2.5)
SODIUM SERPL-SCNC: 139 MMOL/L (ref 136–145)
URATE SERPL-MCNC: 5.7 MG/DL (ref 2.4–5.7)
WBC # BLD AUTO: 4.75 K/UL (ref 3.9–12.7)
WBC # FLD: 193 /CU MM

## 2020-12-23 PROCEDURE — 97530 THERAPEUTIC ACTIVITIES: CPT

## 2020-12-23 PROCEDURE — 80053 COMPREHEN METABOLIC PANEL: CPT

## 2020-12-23 PROCEDURE — 94640 AIRWAY INHALATION TREATMENT: CPT

## 2020-12-23 PROCEDURE — 25000003 PHARM REV CODE 250: Performed by: STUDENT IN AN ORGANIZED HEALTH CARE EDUCATION/TRAINING PROGRAM

## 2020-12-23 PROCEDURE — 25000242 PHARM REV CODE 250 ALT 637 W/ HCPCS: Performed by: STUDENT IN AN ORGANIZED HEALTH CARE EDUCATION/TRAINING PROGRAM

## 2020-12-23 PROCEDURE — 87070 CULTURE OTHR SPECIMN AEROBIC: CPT

## 2020-12-23 PROCEDURE — 87206 SMEAR FLUORESCENT/ACID STAI: CPT

## 2020-12-23 PROCEDURE — 36415 COLL VENOUS BLD VENIPUNCTURE: CPT

## 2020-12-23 PROCEDURE — 94770 HC EXHALED C02 TEST: CPT

## 2020-12-23 PROCEDURE — 89060 EXAM SYNOVIAL FLUID CRYSTALS: CPT

## 2020-12-23 PROCEDURE — 97166 OT EVAL MOD COMPLEX 45 MIN: CPT

## 2020-12-23 PROCEDURE — 84550 ASSAY OF BLOOD/URIC ACID: CPT

## 2020-12-23 PROCEDURE — 11000001 HC ACUTE MED/SURG PRIVATE ROOM

## 2020-12-23 PROCEDURE — 83735 ASSAY OF MAGNESIUM: CPT

## 2020-12-23 PROCEDURE — 63600175 PHARM REV CODE 636 W HCPCS: Performed by: STUDENT IN AN ORGANIZED HEALTH CARE EDUCATION/TRAINING PROGRAM

## 2020-12-23 PROCEDURE — 84100 ASSAY OF PHOSPHORUS: CPT

## 2020-12-23 PROCEDURE — 97162 PT EVAL MOD COMPLEX 30 MIN: CPT

## 2020-12-23 PROCEDURE — 89051 BODY FLUID CELL COUNT: CPT

## 2020-12-23 PROCEDURE — 85025 COMPLETE CBC W/AUTO DIFF WBC: CPT

## 2020-12-23 PROCEDURE — 97116 GAIT TRAINING THERAPY: CPT

## 2020-12-23 PROCEDURE — 25000003 PHARM REV CODE 250: Performed by: EMERGENCY MEDICINE

## 2020-12-23 PROCEDURE — 87075 CULTR BACTERIA EXCEPT BLOOD: CPT

## 2020-12-23 PROCEDURE — 99900035 HC TECH TIME PER 15 MIN (STAT)

## 2020-12-23 PROCEDURE — 87205 SMEAR GRAM STAIN: CPT

## 2020-12-23 PROCEDURE — 84145 PROCALCITONIN (PCT): CPT

## 2020-12-23 PROCEDURE — 99233 PR SUBSEQUENT HOSPITAL CARE,LEVL III: ICD-10-PCS | Mod: ,,, | Performed by: INTERNAL MEDICINE

## 2020-12-23 PROCEDURE — 99233 SBSQ HOSP IP/OBS HIGH 50: CPT | Mod: ,,, | Performed by: INTERNAL MEDICINE

## 2020-12-23 PROCEDURE — 94761 N-INVAS EAR/PLS OXIMETRY MLT: CPT

## 2020-12-23 PROCEDURE — 94799 UNLISTED PULMONARY SVC/PX: CPT

## 2020-12-23 PROCEDURE — 87116 MYCOBACTERIA CULTURE: CPT

## 2020-12-23 PROCEDURE — 85652 RBC SED RATE AUTOMATED: CPT

## 2020-12-23 PROCEDURE — 85045 AUTOMATED RETICULOCYTE COUNT: CPT

## 2020-12-23 PROCEDURE — 86140 C-REACTIVE PROTEIN: CPT

## 2020-12-23 PROCEDURE — 87102 FUNGUS ISOLATION CULTURE: CPT

## 2020-12-23 RX ADMIN — POLYETHYLENE GLYCOL 3350 17 G: 17 POWDER, FOR SOLUTION ORAL at 09:12

## 2020-12-23 RX ADMIN — GABAPENTIN 800 MG: 400 CAPSULE ORAL at 09:12

## 2020-12-23 RX ADMIN — Medication: at 10:12

## 2020-12-23 RX ADMIN — HYDROXYUREA 1000 MG: 500 CAPSULE ORAL at 09:12

## 2020-12-23 RX ADMIN — CARBAMAZEPINE 800 MG: 200 TABLET ORAL at 09:12

## 2020-12-23 RX ADMIN — APIXABAN 5 MG: 5 TABLET, FILM COATED ORAL at 09:12

## 2020-12-23 RX ADMIN — IPRATROPIUM BROMIDE AND ALBUTEROL SULFATE 3 ML: .5; 2.5 SOLUTION RESPIRATORY (INHALATION) at 08:12

## 2020-12-23 RX ADMIN — CARBAMAZEPINE 800 MG: 200 TABLET ORAL at 08:12

## 2020-12-23 RX ADMIN — IPRATROPIUM BROMIDE AND ALBUTEROL SULFATE 3 ML: .5; 2.5 SOLUTION RESPIRATORY (INHALATION) at 01:12

## 2020-12-23 RX ADMIN — CARVEDILOL 25 MG: 25 TABLET, FILM COATED ORAL at 09:12

## 2020-12-23 RX ADMIN — FOLIC ACID 1 MG: 1 TABLET ORAL at 09:12

## 2020-12-23 RX ADMIN — APIXABAN 5 MG: 5 TABLET, FILM COATED ORAL at 08:12

## 2020-12-23 RX ADMIN — CARVEDILOL 25 MG: 25 TABLET, FILM COATED ORAL at 08:12

## 2020-12-23 RX ADMIN — GABAPENTIN 800 MG: 400 CAPSULE ORAL at 08:12

## 2020-12-23 RX ADMIN — FLUOXETINE 40 MG: 20 CAPSULE ORAL at 09:12

## 2020-12-23 RX ADMIN — IPRATROPIUM BROMIDE AND ALBUTEROL SULFATE 3 ML: .5; 2.5 SOLUTION RESPIRATORY (INHALATION) at 04:12

## 2020-12-23 RX ADMIN — DICLOFENAC 4 G: 10 GEL TOPICAL at 09:12

## 2020-12-23 RX ADMIN — DOCUSATE SODIUM 50MG AND SENNOSIDES 8.6MG 1 TABLET: 8.6; 5 TABLET, FILM COATED ORAL at 09:12

## 2020-12-23 RX ADMIN — AMLODIPINE BESYLATE 10 MG: 10 TABLET ORAL at 09:12

## 2020-12-23 RX ADMIN — GABAPENTIN 800 MG: 400 CAPSULE ORAL at 05:12

## 2020-12-24 LAB
ALBUMIN SERPL BCP-MCNC: 3.2 G/DL (ref 3.5–5.2)
ALP SERPL-CCNC: 65 U/L (ref 55–135)
ALT SERPL W/O P-5'-P-CCNC: 20 U/L (ref 10–44)
ANION GAP SERPL CALC-SCNC: 6 MMOL/L (ref 8–16)
AST SERPL-CCNC: 24 U/L (ref 10–40)
BASOPHILS # BLD AUTO: 0.02 K/UL (ref 0–0.2)
BASOPHILS NFR BLD: 0.5 % (ref 0–1.9)
BILIRUB SERPL-MCNC: 0.3 MG/DL (ref 0.1–1)
BUN SERPL-MCNC: 10 MG/DL (ref 6–20)
CALCIUM SERPL-MCNC: 8.4 MG/DL (ref 8.7–10.5)
CHLORIDE SERPL-SCNC: 107 MMOL/L (ref 95–110)
CO2 SERPL-SCNC: 28 MMOL/L (ref 23–29)
CREAT SERPL-MCNC: 0.8 MG/DL (ref 0.5–1.4)
DIFFERENTIAL METHOD: ABNORMAL
EOSINOPHIL # BLD AUTO: 0.1 K/UL (ref 0–0.5)
EOSINOPHIL NFR BLD: 2.6 % (ref 0–8)
ERYTHROCYTE [DISTWIDTH] IN BLOOD BY AUTOMATED COUNT: 21.2 % (ref 11.5–14.5)
EST. GFR  (AFRICAN AMERICAN): >60 ML/MIN/1.73 M^2
EST. GFR  (NON AFRICAN AMERICAN): >60 ML/MIN/1.73 M^2
GLUCOSE SERPL-MCNC: 110 MG/DL (ref 70–110)
HCT VFR BLD AUTO: 25.9 % (ref 37–48.5)
HGB BLD-MCNC: 8.3 G/DL (ref 12–16)
IMM GRANULOCYTES # BLD AUTO: 0.01 K/UL (ref 0–0.04)
IMM GRANULOCYTES NFR BLD AUTO: 0.2 % (ref 0–0.5)
LYMPHOCYTES # BLD AUTO: 2.4 K/UL (ref 1–4.8)
LYMPHOCYTES NFR BLD: 57.6 % (ref 18–48)
MAGNESIUM SERPL-MCNC: 1.9 MG/DL (ref 1.6–2.6)
MCH RBC QN AUTO: 25.5 PG (ref 27–31)
MCHC RBC AUTO-ENTMCNC: 32 G/DL (ref 32–36)
MCV RBC AUTO: 80 FL (ref 82–98)
MONOCYTES # BLD AUTO: 0.3 K/UL (ref 0.3–1)
MONOCYTES NFR BLD: 7.1 % (ref 4–15)
NEUTROPHILS # BLD AUTO: 1.3 K/UL (ref 1.8–7.7)
NEUTROPHILS NFR BLD: 32 % (ref 38–73)
NRBC BLD-RTO: 2 /100 WBC
PHOSPHATE SERPL-MCNC: 2 MG/DL (ref 2.7–4.5)
PLATELET # BLD AUTO: 135 K/UL (ref 150–350)
PMV BLD AUTO: 9.1 FL (ref 9.2–12.9)
POTASSIUM SERPL-SCNC: 4.1 MMOL/L (ref 3.5–5.1)
PROT SERPL-MCNC: 6.9 G/DL (ref 6–8.4)
RBC # BLD AUTO: 3.25 M/UL (ref 4–5.4)
SODIUM SERPL-SCNC: 141 MMOL/L (ref 136–145)
WBC # BLD AUTO: 4.2 K/UL (ref 3.9–12.7)

## 2020-12-24 PROCEDURE — 94640 AIRWAY INHALATION TREATMENT: CPT

## 2020-12-24 PROCEDURE — 25000242 PHARM REV CODE 250 ALT 637 W/ HCPCS: Performed by: STUDENT IN AN ORGANIZED HEALTH CARE EDUCATION/TRAINING PROGRAM

## 2020-12-24 PROCEDURE — 99233 SBSQ HOSP IP/OBS HIGH 50: CPT | Mod: ,,, | Performed by: INTERNAL MEDICINE

## 2020-12-24 PROCEDURE — 85025 COMPLETE CBC W/AUTO DIFF WBC: CPT

## 2020-12-24 PROCEDURE — 84100 ASSAY OF PHOSPHORUS: CPT

## 2020-12-24 PROCEDURE — 25000003 PHARM REV CODE 250: Performed by: EMERGENCY MEDICINE

## 2020-12-24 PROCEDURE — 99233 PR SUBSEQUENT HOSPITAL CARE,LEVL III: ICD-10-PCS | Mod: ,,, | Performed by: INTERNAL MEDICINE

## 2020-12-24 PROCEDURE — 63600175 PHARM REV CODE 636 W HCPCS: Performed by: STUDENT IN AN ORGANIZED HEALTH CARE EDUCATION/TRAINING PROGRAM

## 2020-12-24 PROCEDURE — 94770 HC EXHALED C02 TEST: CPT

## 2020-12-24 PROCEDURE — 99900035 HC TECH TIME PER 15 MIN (STAT)

## 2020-12-24 PROCEDURE — 25000003 PHARM REV CODE 250: Performed by: STUDENT IN AN ORGANIZED HEALTH CARE EDUCATION/TRAINING PROGRAM

## 2020-12-24 PROCEDURE — 80053 COMPREHEN METABOLIC PANEL: CPT

## 2020-12-24 PROCEDURE — 94761 N-INVAS EAR/PLS OXIMETRY MLT: CPT

## 2020-12-24 PROCEDURE — 11000001 HC ACUTE MED/SURG PRIVATE ROOM

## 2020-12-24 PROCEDURE — 83735 ASSAY OF MAGNESIUM: CPT

## 2020-12-24 PROCEDURE — 27000221 HC OXYGEN, UP TO 24 HOURS

## 2020-12-24 PROCEDURE — 36415 COLL VENOUS BLD VENIPUNCTURE: CPT

## 2020-12-24 RX ORDER — CYCLOBENZAPRINE HCL 5 MG
5 TABLET ORAL 3 TIMES DAILY PRN
Status: DISCONTINUED | OUTPATIENT
Start: 2020-12-24 | End: 2020-12-24

## 2020-12-24 RX ORDER — CELECOXIB 100 MG/1
200 CAPSULE ORAL 2 TIMES DAILY
Status: DISCONTINUED | OUTPATIENT
Start: 2020-12-24 | End: 2020-12-27 | Stop reason: HOSPADM

## 2020-12-24 RX ORDER — SODIUM,POTASSIUM PHOSPHATES 280-250MG
2 POWDER IN PACKET (EA) ORAL ONCE
Status: DISCONTINUED | OUTPATIENT
Start: 2020-12-24 | End: 2020-12-26

## 2020-12-24 RX ORDER — CELECOXIB 100 MG/1
400 CAPSULE ORAL ONCE
Status: COMPLETED | OUTPATIENT
Start: 2020-12-24 | End: 2020-12-24

## 2020-12-24 RX ORDER — SODIUM,POTASSIUM PHOSPHATES 280-250MG
2 POWDER IN PACKET (EA) ORAL EVERY 4 HOURS
Status: DISPENSED | OUTPATIENT
Start: 2020-12-24 | End: 2020-12-24

## 2020-12-24 RX ORDER — HYDROMORPHONE HYDROCHLORIDE 1 MG/ML
1 INJECTION, SOLUTION INTRAMUSCULAR; INTRAVENOUS; SUBCUTANEOUS ONCE
Status: COMPLETED | OUTPATIENT
Start: 2020-12-24 | End: 2020-12-24

## 2020-12-24 RX ORDER — CYCLOBENZAPRINE HCL 10 MG
10 TABLET ORAL 3 TIMES DAILY
Status: DISCONTINUED | OUTPATIENT
Start: 2020-12-24 | End: 2020-12-27 | Stop reason: HOSPADM

## 2020-12-24 RX ORDER — ACETAMINOPHEN 500 MG
1000 TABLET ORAL EVERY 8 HOURS PRN
Status: DISCONTINUED | OUTPATIENT
Start: 2020-12-24 | End: 2020-12-26

## 2020-12-24 RX ADMIN — DOCUSATE SODIUM 50MG AND SENNOSIDES 8.6MG 1 TABLET: 8.6; 5 TABLET, FILM COATED ORAL at 10:12

## 2020-12-24 RX ADMIN — POTASSIUM & SODIUM PHOSPHATES POWDER PACK 280-160-250 MG 2 PACKET: 280-160-250 PACK at 04:12

## 2020-12-24 RX ADMIN — IPRATROPIUM BROMIDE AND ALBUTEROL SULFATE 3 ML: .5; 2.5 SOLUTION RESPIRATORY (INHALATION) at 09:12

## 2020-12-24 RX ADMIN — GABAPENTIN 800 MG: 400 CAPSULE ORAL at 04:12

## 2020-12-24 RX ADMIN — AMLODIPINE BESYLATE 10 MG: 10 TABLET ORAL at 10:12

## 2020-12-24 RX ADMIN — FOLIC ACID 1 MG: 1 TABLET ORAL at 10:12

## 2020-12-24 RX ADMIN — Medication: at 12:12

## 2020-12-24 RX ADMIN — CARBAMAZEPINE 800 MG: 200 TABLET ORAL at 09:12

## 2020-12-24 RX ADMIN — APIXABAN 5 MG: 5 TABLET, FILM COATED ORAL at 10:12

## 2020-12-24 RX ADMIN — CELECOXIB 200 MG: 100 CAPSULE ORAL at 06:12

## 2020-12-24 RX ADMIN — CARVEDILOL 25 MG: 25 TABLET, FILM COATED ORAL at 10:12

## 2020-12-24 RX ADMIN — HYDROXYUREA 1000 MG: 500 CAPSULE ORAL at 10:12

## 2020-12-24 RX ADMIN — CYCLOBENZAPRINE 10 MG: 10 TABLET, FILM COATED ORAL at 09:12

## 2020-12-24 RX ADMIN — CARBAMAZEPINE 800 MG: 200 TABLET ORAL at 10:12

## 2020-12-24 RX ADMIN — IPRATROPIUM BROMIDE AND ALBUTEROL SULFATE 3 ML: .5; 2.5 SOLUTION RESPIRATORY (INHALATION) at 03:12

## 2020-12-24 RX ADMIN — FLUOXETINE 40 MG: 20 CAPSULE ORAL at 10:12

## 2020-12-24 RX ADMIN — GABAPENTIN 800 MG: 400 CAPSULE ORAL at 10:12

## 2020-12-24 RX ADMIN — IPRATROPIUM BROMIDE AND ALBUTEROL SULFATE 3 ML: .5; 2.5 SOLUTION RESPIRATORY (INHALATION) at 12:12

## 2020-12-24 RX ADMIN — CYCLOBENZAPRINE 10 MG: 10 TABLET, FILM COATED ORAL at 04:12

## 2020-12-24 RX ADMIN — CELECOXIB 400 MG: 100 CAPSULE ORAL at 10:12

## 2020-12-24 RX ADMIN — POTASSIUM & SODIUM PHOSPHATES POWDER PACK 280-160-250 MG 2 PACKET: 280-160-250 PACK at 10:12

## 2020-12-24 RX ADMIN — GABAPENTIN 800 MG: 400 CAPSULE ORAL at 09:12

## 2020-12-24 RX ADMIN — APIXABAN 5 MG: 5 TABLET, FILM COATED ORAL at 09:12

## 2020-12-24 RX ADMIN — CARVEDILOL 25 MG: 25 TABLET, FILM COATED ORAL at 09:12

## 2020-12-24 RX ADMIN — HYDROMORPHONE HYDROCHLORIDE 1 MG: 1 INJECTION, SOLUTION INTRAMUSCULAR; INTRAVENOUS; SUBCUTANEOUS at 10:12

## 2020-12-24 RX ADMIN — POLYETHYLENE GLYCOL 3350 17 G: 17 POWDER, FOR SOLUTION ORAL at 10:12

## 2020-12-25 LAB
ALBUMIN SERPL BCP-MCNC: 3.2 G/DL (ref 3.5–5.2)
ALP SERPL-CCNC: 63 U/L (ref 55–135)
ALT SERPL W/O P-5'-P-CCNC: 16 U/L (ref 10–44)
ANION GAP SERPL CALC-SCNC: 9 MMOL/L (ref 8–16)
ANISOCYTOSIS BLD QL SMEAR: SLIGHT
AST SERPL-CCNC: 17 U/L (ref 10–40)
BASOPHILS # BLD AUTO: 0.03 K/UL (ref 0–0.2)
BASOPHILS NFR BLD: 0.7 % (ref 0–1.9)
BILIRUB SERPL-MCNC: 0.3 MG/DL (ref 0.1–1)
BUN SERPL-MCNC: 11 MG/DL (ref 6–20)
CALCIUM SERPL-MCNC: 8.5 MG/DL (ref 8.7–10.5)
CHLORIDE SERPL-SCNC: 105 MMOL/L (ref 95–110)
CO2 SERPL-SCNC: 27 MMOL/L (ref 23–29)
CREAT SERPL-MCNC: 0.9 MG/DL (ref 0.5–1.4)
DIFFERENTIAL METHOD: ABNORMAL
EOSINOPHIL # BLD AUTO: 0.1 K/UL (ref 0–0.5)
EOSINOPHIL NFR BLD: 2.5 % (ref 0–8)
ERYTHROCYTE [DISTWIDTH] IN BLOOD BY AUTOMATED COUNT: 21 % (ref 11.5–14.5)
EST. GFR  (AFRICAN AMERICAN): >60 ML/MIN/1.73 M^2
EST. GFR  (NON AFRICAN AMERICAN): >60 ML/MIN/1.73 M^2
GLUCOSE SERPL-MCNC: 111 MG/DL (ref 70–110)
HCT VFR BLD AUTO: 26.9 % (ref 37–48.5)
HGB BLD-MCNC: 8.6 G/DL (ref 12–16)
HYPOCHROMIA BLD QL SMEAR: ABNORMAL
IMM GRANULOCYTES # BLD AUTO: 0.01 K/UL (ref 0–0.04)
IMM GRANULOCYTES NFR BLD AUTO: 0.2 % (ref 0–0.5)
LYMPHOCYTES # BLD AUTO: 2.6 K/UL (ref 1–4.8)
LYMPHOCYTES NFR BLD: 65 % (ref 18–48)
MAGNESIUM SERPL-MCNC: 1.9 MG/DL (ref 1.6–2.6)
MCH RBC QN AUTO: 25.8 PG (ref 27–31)
MCHC RBC AUTO-ENTMCNC: 32 G/DL (ref 32–36)
MCV RBC AUTO: 81 FL (ref 82–98)
MONOCYTES # BLD AUTO: 0.4 K/UL (ref 0.3–1)
MONOCYTES NFR BLD: 9.4 % (ref 4–15)
NEUTROPHILS # BLD AUTO: 0.9 K/UL (ref 1.8–7.7)
NEUTROPHILS NFR BLD: 22.2 % (ref 38–73)
NRBC BLD-RTO: 3 /100 WBC
OVALOCYTES BLD QL SMEAR: ABNORMAL
PHOSPHATE SERPL-MCNC: 3.2 MG/DL (ref 2.7–4.5)
PLATELET # BLD AUTO: 114 K/UL (ref 150–350)
PLATELET BLD QL SMEAR: ABNORMAL
PMV BLD AUTO: 8.8 FL (ref 9.2–12.9)
POIKILOCYTOSIS BLD QL SMEAR: SLIGHT
POLYCHROMASIA BLD QL SMEAR: ABNORMAL
POTASSIUM SERPL-SCNC: 4.1 MMOL/L (ref 3.5–5.1)
PROT SERPL-MCNC: 6.9 G/DL (ref 6–8.4)
RBC # BLD AUTO: 3.33 M/UL (ref 4–5.4)
SODIUM SERPL-SCNC: 141 MMOL/L (ref 136–145)
TARGETS BLD QL SMEAR: ABNORMAL
WBC # BLD AUTO: 4.03 K/UL (ref 3.9–12.7)

## 2020-12-25 PROCEDURE — 80053 COMPREHEN METABOLIC PANEL: CPT

## 2020-12-25 PROCEDURE — 25000003 PHARM REV CODE 250: Performed by: STUDENT IN AN ORGANIZED HEALTH CARE EDUCATION/TRAINING PROGRAM

## 2020-12-25 PROCEDURE — 94770 HC EXHALED C02 TEST: CPT

## 2020-12-25 PROCEDURE — 99233 SBSQ HOSP IP/OBS HIGH 50: CPT | Mod: ,,, | Performed by: INTERNAL MEDICINE

## 2020-12-25 PROCEDURE — 63600175 PHARM REV CODE 636 W HCPCS: Performed by: STUDENT IN AN ORGANIZED HEALTH CARE EDUCATION/TRAINING PROGRAM

## 2020-12-25 PROCEDURE — 83735 ASSAY OF MAGNESIUM: CPT

## 2020-12-25 PROCEDURE — 25000003 PHARM REV CODE 250: Performed by: EMERGENCY MEDICINE

## 2020-12-25 PROCEDURE — 85025 COMPLETE CBC W/AUTO DIFF WBC: CPT

## 2020-12-25 PROCEDURE — 25000242 PHARM REV CODE 250 ALT 637 W/ HCPCS: Performed by: STUDENT IN AN ORGANIZED HEALTH CARE EDUCATION/TRAINING PROGRAM

## 2020-12-25 PROCEDURE — 25000242 PHARM REV CODE 250 ALT 637 W/ HCPCS: Performed by: INTERNAL MEDICINE

## 2020-12-25 PROCEDURE — 94761 N-INVAS EAR/PLS OXIMETRY MLT: CPT

## 2020-12-25 PROCEDURE — 94640 AIRWAY INHALATION TREATMENT: CPT

## 2020-12-25 PROCEDURE — 11000001 HC ACUTE MED/SURG PRIVATE ROOM

## 2020-12-25 PROCEDURE — 99233 PR SUBSEQUENT HOSPITAL CARE,LEVL III: ICD-10-PCS | Mod: ,,, | Performed by: INTERNAL MEDICINE

## 2020-12-25 PROCEDURE — 27000221 HC OXYGEN, UP TO 24 HOURS

## 2020-12-25 PROCEDURE — 84100 ASSAY OF PHOSPHORUS: CPT

## 2020-12-25 PROCEDURE — 36415 COLL VENOUS BLD VENIPUNCTURE: CPT

## 2020-12-25 PROCEDURE — 99900035 HC TECH TIME PER 15 MIN (STAT)

## 2020-12-25 RX ORDER — IPRATROPIUM BROMIDE AND ALBUTEROL SULFATE 2.5; .5 MG/3ML; MG/3ML
3 SOLUTION RESPIRATORY (INHALATION)
Status: DISCONTINUED | OUTPATIENT
Start: 2020-12-25 | End: 2020-12-27 | Stop reason: HOSPADM

## 2020-12-25 RX ORDER — MAGNESIUM SULFATE HEPTAHYDRATE 40 MG/ML
2 INJECTION, SOLUTION INTRAVENOUS ONCE
Status: COMPLETED | OUTPATIENT
Start: 2020-12-25 | End: 2020-12-25

## 2020-12-25 RX ADMIN — GABAPENTIN 800 MG: 400 CAPSULE ORAL at 08:12

## 2020-12-25 RX ADMIN — CARVEDILOL 25 MG: 25 TABLET, FILM COATED ORAL at 09:12

## 2020-12-25 RX ADMIN — CELECOXIB 200 MG: 100 CAPSULE ORAL at 11:12

## 2020-12-25 RX ADMIN — POLYETHYLENE GLYCOL 3350 17 G: 17 POWDER, FOR SOLUTION ORAL at 08:12

## 2020-12-25 RX ADMIN — CARBAMAZEPINE 800 MG: 200 TABLET ORAL at 12:12

## 2020-12-25 RX ADMIN — IPRATROPIUM BROMIDE AND ALBUTEROL SULFATE 3 ML: .5; 2.5 SOLUTION RESPIRATORY (INHALATION) at 02:12

## 2020-12-25 RX ADMIN — HYDROXYUREA 1000 MG: 500 CAPSULE ORAL at 09:12

## 2020-12-25 RX ADMIN — HYDROXYUREA 1000 MG: 500 CAPSULE ORAL at 08:12

## 2020-12-25 RX ADMIN — GABAPENTIN 800 MG: 400 CAPSULE ORAL at 09:12

## 2020-12-25 RX ADMIN — IPRATROPIUM BROMIDE AND ALBUTEROL SULFATE 3 ML: .5; 2.5 SOLUTION RESPIRATORY (INHALATION) at 09:12

## 2020-12-25 RX ADMIN — CYCLOBENZAPRINE 10 MG: 10 TABLET, FILM COATED ORAL at 09:12

## 2020-12-25 RX ADMIN — DOCUSATE SODIUM 50MG AND SENNOSIDES 8.6MG 1 TABLET: 8.6; 5 TABLET, FILM COATED ORAL at 08:12

## 2020-12-25 RX ADMIN — FOLIC ACID 1 MG: 1 TABLET ORAL at 08:12

## 2020-12-25 RX ADMIN — IPRATROPIUM BROMIDE AND ALBUTEROL SULFATE 3 ML: .5; 2.5 SOLUTION RESPIRATORY (INHALATION) at 08:12

## 2020-12-25 RX ADMIN — APIXABAN 5 MG: 5 TABLET, FILM COATED ORAL at 08:12

## 2020-12-25 RX ADMIN — CARVEDILOL 25 MG: 25 TABLET, FILM COATED ORAL at 08:12

## 2020-12-25 RX ADMIN — AMLODIPINE BESYLATE 10 MG: 10 TABLET ORAL at 08:12

## 2020-12-25 RX ADMIN — CELECOXIB 200 MG: 100 CAPSULE ORAL at 09:12

## 2020-12-25 RX ADMIN — CARBAMAZEPINE 800 MG: 200 TABLET ORAL at 09:12

## 2020-12-25 RX ADMIN — MAGNESIUM SULFATE 2 G: 2 INJECTION INTRAVENOUS at 08:12

## 2020-12-25 RX ADMIN — FLUOXETINE 40 MG: 20 CAPSULE ORAL at 08:12

## 2020-12-25 RX ADMIN — APIXABAN 5 MG: 5 TABLET, FILM COATED ORAL at 09:12

## 2020-12-26 LAB
ALBUMIN SERPL BCP-MCNC: 3.4 G/DL (ref 3.5–5.2)
ALP SERPL-CCNC: 70 U/L (ref 55–135)
ALT SERPL W/O P-5'-P-CCNC: 20 U/L (ref 10–44)
ANION GAP SERPL CALC-SCNC: 8 MMOL/L (ref 8–16)
AST SERPL-CCNC: 24 U/L (ref 10–40)
BACTERIA SPEC AEROBE CULT: NO GROWTH
BASOPHILS # BLD AUTO: 0.02 K/UL (ref 0–0.2)
BASOPHILS NFR BLD: 0.6 % (ref 0–1.9)
BILIRUB SERPL-MCNC: 0.3 MG/DL (ref 0.1–1)
BUN SERPL-MCNC: 13 MG/DL (ref 6–20)
CALCIUM SERPL-MCNC: 8.4 MG/DL (ref 8.7–10.5)
CHLORIDE SERPL-SCNC: 105 MMOL/L (ref 95–110)
CO2 SERPL-SCNC: 26 MMOL/L (ref 23–29)
CREAT SERPL-MCNC: 0.8 MG/DL (ref 0.5–1.4)
DIFFERENTIAL METHOD: ABNORMAL
EOSINOPHIL # BLD AUTO: 0.1 K/UL (ref 0–0.5)
EOSINOPHIL NFR BLD: 2.4 % (ref 0–8)
ERYTHROCYTE [DISTWIDTH] IN BLOOD BY AUTOMATED COUNT: 21.5 % (ref 11.5–14.5)
EST. GFR  (AFRICAN AMERICAN): >60 ML/MIN/1.73 M^2
EST. GFR  (NON AFRICAN AMERICAN): >60 ML/MIN/1.73 M^2
GLUCOSE SERPL-MCNC: 111 MG/DL (ref 70–110)
HCT VFR BLD AUTO: 27.1 % (ref 37–48.5)
HGB BLD-MCNC: 8.9 G/DL (ref 12–16)
IMM GRANULOCYTES # BLD AUTO: 0.01 K/UL (ref 0–0.04)
IMM GRANULOCYTES NFR BLD AUTO: 0.3 % (ref 0–0.5)
LYMPHOCYTES # BLD AUTO: 1.9 K/UL (ref 1–4.8)
LYMPHOCYTES NFR BLD: 55.9 % (ref 18–48)
MAGNESIUM SERPL-MCNC: 2.1 MG/DL (ref 1.6–2.6)
MCH RBC QN AUTO: 25.5 PG (ref 27–31)
MCHC RBC AUTO-ENTMCNC: 32.8 G/DL (ref 32–36)
MCV RBC AUTO: 78 FL (ref 82–98)
MONOCYTES # BLD AUTO: 0.2 K/UL (ref 0.3–1)
MONOCYTES NFR BLD: 6.6 % (ref 4–15)
NEUTROPHILS # BLD AUTO: 1.1 K/UL (ref 1.8–7.7)
NEUTROPHILS NFR BLD: 34.2 % (ref 38–73)
NRBC BLD-RTO: 2 /100 WBC
PHOSPHATE SERPL-MCNC: 2.5 MG/DL (ref 2.7–4.5)
PLATELET # BLD AUTO: 126 K/UL (ref 150–350)
PMV BLD AUTO: 8.9 FL (ref 9.2–12.9)
POTASSIUM SERPL-SCNC: 4.5 MMOL/L (ref 3.5–5.1)
PROT SERPL-MCNC: 7.1 G/DL (ref 6–8.4)
RBC # BLD AUTO: 3.49 M/UL (ref 4–5.4)
SODIUM SERPL-SCNC: 139 MMOL/L (ref 136–145)
WBC # BLD AUTO: 3.31 K/UL (ref 3.9–12.7)

## 2020-12-26 PROCEDURE — 99232 SBSQ HOSP IP/OBS MODERATE 35: CPT | Mod: ,,, | Performed by: INTERNAL MEDICINE

## 2020-12-26 PROCEDURE — 25000003 PHARM REV CODE 250: Performed by: STUDENT IN AN ORGANIZED HEALTH CARE EDUCATION/TRAINING PROGRAM

## 2020-12-26 PROCEDURE — 99900035 HC TECH TIME PER 15 MIN (STAT)

## 2020-12-26 PROCEDURE — 84100 ASSAY OF PHOSPHORUS: CPT

## 2020-12-26 PROCEDURE — 94640 AIRWAY INHALATION TREATMENT: CPT

## 2020-12-26 PROCEDURE — 36415 COLL VENOUS BLD VENIPUNCTURE: CPT

## 2020-12-26 PROCEDURE — 83735 ASSAY OF MAGNESIUM: CPT

## 2020-12-26 PROCEDURE — 99232 PR SUBSEQUENT HOSPITAL CARE,LEVL II: ICD-10-PCS | Mod: ,,, | Performed by: INTERNAL MEDICINE

## 2020-12-26 PROCEDURE — 11000001 HC ACUTE MED/SURG PRIVATE ROOM

## 2020-12-26 PROCEDURE — 85025 COMPLETE CBC W/AUTO DIFF WBC: CPT

## 2020-12-26 PROCEDURE — 94761 N-INVAS EAR/PLS OXIMETRY MLT: CPT

## 2020-12-26 PROCEDURE — 63600175 PHARM REV CODE 636 W HCPCS: Performed by: STUDENT IN AN ORGANIZED HEALTH CARE EDUCATION/TRAINING PROGRAM

## 2020-12-26 PROCEDURE — 80053 COMPREHEN METABOLIC PANEL: CPT

## 2020-12-26 PROCEDURE — 94799 UNLISTED PULMONARY SVC/PX: CPT

## 2020-12-26 PROCEDURE — 27000221 HC OXYGEN, UP TO 24 HOURS

## 2020-12-26 PROCEDURE — 25000003 PHARM REV CODE 250: Performed by: EMERGENCY MEDICINE

## 2020-12-26 PROCEDURE — 94770 HC EXHALED C02 TEST: CPT

## 2020-12-26 PROCEDURE — 25000242 PHARM REV CODE 250 ALT 637 W/ HCPCS: Performed by: INTERNAL MEDICINE

## 2020-12-26 RX ORDER — CELECOXIB 200 MG/1
200 CAPSULE ORAL 2 TIMES DAILY
Qty: 14 CAPSULE | Refills: 0 | Status: SHIPPED | OUTPATIENT
Start: 2020-12-26 | End: 2021-02-23

## 2020-12-26 RX ORDER — HYDROMORPHONE HYDROCHLORIDE 1 MG/ML
0.5 INJECTION, SOLUTION INTRAMUSCULAR; INTRAVENOUS; SUBCUTANEOUS EVERY 4 HOURS PRN
Status: DISCONTINUED | OUTPATIENT
Start: 2020-12-26 | End: 2020-12-27 | Stop reason: HOSPADM

## 2020-12-26 RX ORDER — CYCLOBENZAPRINE HCL 10 MG
10 TABLET ORAL 3 TIMES DAILY
Qty: 21 TABLET | Refills: 0 | Status: SHIPPED | OUTPATIENT
Start: 2020-12-26 | End: 2021-01-12 | Stop reason: SDUPTHER

## 2020-12-26 RX ORDER — OXYCODONE AND ACETAMINOPHEN 10; 325 MG/1; MG/1
1 TABLET ORAL EVERY 6 HOURS PRN
Qty: 120 TABLET | Refills: 0 | Status: SHIPPED | OUTPATIENT
Start: 2020-12-26 | End: 2021-01-12 | Stop reason: SDUPTHER

## 2020-12-26 RX ORDER — SODIUM,POTASSIUM PHOSPHATES 280-250MG
2 POWDER IN PACKET (EA) ORAL EVERY 4 HOURS
Status: COMPLETED | OUTPATIENT
Start: 2020-12-26 | End: 2020-12-26

## 2020-12-26 RX ORDER — OXYCODONE HYDROCHLORIDE 10 MG/1
10 TABLET ORAL EVERY 4 HOURS PRN
Status: DISCONTINUED | OUTPATIENT
Start: 2020-12-26 | End: 2020-12-27

## 2020-12-26 RX ORDER — FOLIC ACID 1 MG/1
1 TABLET ORAL DAILY
Qty: 30 TABLET | Refills: 11 | Status: SHIPPED | OUTPATIENT
Start: 2020-12-27 | End: 2021-12-27

## 2020-12-26 RX ORDER — ACETAMINOPHEN 500 MG
1000 TABLET ORAL EVERY 8 HOURS
Status: DISCONTINUED | OUTPATIENT
Start: 2020-12-26 | End: 2020-12-27 | Stop reason: HOSPADM

## 2020-12-26 RX ADMIN — FOLIC ACID 1 MG: 1 TABLET ORAL at 09:12

## 2020-12-26 RX ADMIN — GABAPENTIN 800 MG: 400 CAPSULE ORAL at 02:12

## 2020-12-26 RX ADMIN — CARVEDILOL 25 MG: 25 TABLET, FILM COATED ORAL at 09:12

## 2020-12-26 RX ADMIN — FLUOXETINE 40 MG: 20 CAPSULE ORAL at 09:12

## 2020-12-26 RX ADMIN — HYDROXYUREA 1000 MG: 500 CAPSULE ORAL at 08:12

## 2020-12-26 RX ADMIN — POLYETHYLENE GLYCOL 3350 17 G: 17 POWDER, FOR SOLUTION ORAL at 09:12

## 2020-12-26 RX ADMIN — OXYCODONE HYDROCHLORIDE 10 MG: 10 TABLET ORAL at 02:12

## 2020-12-26 RX ADMIN — POTASSIUM & SODIUM PHOSPHATES POWDER PACK 280-160-250 MG 2 PACKET: 280-160-250 PACK at 02:12

## 2020-12-26 RX ADMIN — HYDROMORPHONE HYDROCHLORIDE 0.5 MG: 1 INJECTION, SOLUTION INTRAMUSCULAR; INTRAVENOUS; SUBCUTANEOUS at 08:12

## 2020-12-26 RX ADMIN — HYDROMORPHONE HYDROCHLORIDE 0.5 MG: 1 INJECTION, SOLUTION INTRAMUSCULAR; INTRAVENOUS; SUBCUTANEOUS at 04:12

## 2020-12-26 RX ADMIN — HYDROXYUREA 1000 MG: 500 CAPSULE ORAL at 09:12

## 2020-12-26 RX ADMIN — AMLODIPINE BESYLATE 10 MG: 10 TABLET ORAL at 09:12

## 2020-12-26 RX ADMIN — CELECOXIB 200 MG: 100 CAPSULE ORAL at 09:12

## 2020-12-26 RX ADMIN — POTASSIUM & SODIUM PHOSPHATES POWDER PACK 280-160-250 MG 2 PACKET: 280-160-250 PACK at 09:12

## 2020-12-26 RX ADMIN — CARBAMAZEPINE 800 MG: 200 TABLET ORAL at 09:12

## 2020-12-26 RX ADMIN — GABAPENTIN 800 MG: 400 CAPSULE ORAL at 08:12

## 2020-12-26 RX ADMIN — IPRATROPIUM BROMIDE AND ALBUTEROL SULFATE 3 ML: .5; 2.5 SOLUTION RESPIRATORY (INHALATION) at 07:12

## 2020-12-26 RX ADMIN — CYCLOBENZAPRINE 10 MG: 10 TABLET, FILM COATED ORAL at 02:12

## 2020-12-26 RX ADMIN — ACETAMINOPHEN 1000 MG: 500 TABLET ORAL at 02:12

## 2020-12-26 RX ADMIN — CARBAMAZEPINE 800 MG: 200 TABLET ORAL at 04:12

## 2020-12-26 RX ADMIN — DOCUSATE SODIUM 50MG AND SENNOSIDES 8.6MG 1 TABLET: 8.6; 5 TABLET, FILM COATED ORAL at 09:12

## 2020-12-26 RX ADMIN — CYCLOBENZAPRINE 10 MG: 10 TABLET, FILM COATED ORAL at 08:12

## 2020-12-26 RX ADMIN — IPRATROPIUM BROMIDE AND ALBUTEROL SULFATE 3 ML: .5; 2.5 SOLUTION RESPIRATORY (INHALATION) at 02:12

## 2020-12-26 RX ADMIN — APIXABAN 5 MG: 5 TABLET, FILM COATED ORAL at 08:12

## 2020-12-26 RX ADMIN — GABAPENTIN 800 MG: 400 CAPSULE ORAL at 09:12

## 2020-12-26 RX ADMIN — ACETAMINOPHEN 1000 MG: 500 TABLET ORAL at 09:12

## 2020-12-26 RX ADMIN — OXYCODONE HYDROCHLORIDE 10 MG: 10 TABLET ORAL at 09:12

## 2020-12-26 RX ADMIN — CARVEDILOL 25 MG: 25 TABLET, FILM COATED ORAL at 08:12

## 2020-12-26 RX ADMIN — APIXABAN 5 MG: 5 TABLET, FILM COATED ORAL at 09:12

## 2020-12-26 RX ADMIN — CELECOXIB 200 MG: 100 CAPSULE ORAL at 08:12

## 2020-12-26 RX ADMIN — CYCLOBENZAPRINE 10 MG: 10 TABLET, FILM COATED ORAL at 09:12

## 2020-12-27 VITALS
TEMPERATURE: 98 F | RESPIRATION RATE: 16 BRPM | HEART RATE: 87 BPM | OXYGEN SATURATION: 94 % | BODY MASS INDEX: 53.92 KG/M2 | HEIGHT: 62 IN | DIASTOLIC BLOOD PRESSURE: 86 MMHG | WEIGHT: 293 LBS | SYSTOLIC BLOOD PRESSURE: 156 MMHG

## 2020-12-27 LAB
ALBUMIN SERPL BCP-MCNC: 3.3 G/DL (ref 3.5–5.2)
ALP SERPL-CCNC: 69 U/L (ref 55–135)
ALT SERPL W/O P-5'-P-CCNC: 21 U/L (ref 10–44)
ANION GAP SERPL CALC-SCNC: 8 MMOL/L (ref 8–16)
AST SERPL-CCNC: 23 U/L (ref 10–40)
BASOPHILS # BLD AUTO: 0.01 K/UL (ref 0–0.2)
BASOPHILS NFR BLD: 0.3 % (ref 0–1.9)
BILIRUB SERPL-MCNC: 0.4 MG/DL (ref 0.1–1)
BUN SERPL-MCNC: 9 MG/DL (ref 6–20)
CALCIUM SERPL-MCNC: 8.8 MG/DL (ref 8.7–10.5)
CHLORIDE SERPL-SCNC: 105 MMOL/L (ref 95–110)
CO2 SERPL-SCNC: 27 MMOL/L (ref 23–29)
CREAT SERPL-MCNC: 0.8 MG/DL (ref 0.5–1.4)
DIFFERENTIAL METHOD: ABNORMAL
EOSINOPHIL # BLD AUTO: 0.1 K/UL (ref 0–0.5)
EOSINOPHIL NFR BLD: 1.8 % (ref 0–8)
ERYTHROCYTE [DISTWIDTH] IN BLOOD BY AUTOMATED COUNT: 22 % (ref 11.5–14.5)
EST. GFR  (AFRICAN AMERICAN): >60 ML/MIN/1.73 M^2
EST. GFR  (NON AFRICAN AMERICAN): >60 ML/MIN/1.73 M^2
GLUCOSE SERPL-MCNC: 93 MG/DL (ref 70–110)
HCT VFR BLD AUTO: 27.1 % (ref 37–48.5)
HGB BLD-MCNC: 8.8 G/DL (ref 12–16)
IMM GRANULOCYTES # BLD AUTO: 0.01 K/UL (ref 0–0.04)
IMM GRANULOCYTES NFR BLD AUTO: 0.3 % (ref 0–0.5)
LYMPHOCYTES # BLD AUTO: 2.5 K/UL (ref 1–4.8)
LYMPHOCYTES NFR BLD: 63.8 % (ref 18–48)
MAGNESIUM SERPL-MCNC: 1.9 MG/DL (ref 1.6–2.6)
MCH RBC QN AUTO: 25.4 PG (ref 27–31)
MCHC RBC AUTO-ENTMCNC: 32.5 G/DL (ref 32–36)
MCV RBC AUTO: 78 FL (ref 82–98)
MONOCYTES # BLD AUTO: 0.2 K/UL (ref 0.3–1)
MONOCYTES NFR BLD: 4.6 % (ref 4–15)
NEUTROPHILS # BLD AUTO: 1.1 K/UL (ref 1.8–7.7)
NEUTROPHILS NFR BLD: 29.2 % (ref 38–73)
NRBC BLD-RTO: 3 /100 WBC
PHOSPHATE SERPL-MCNC: 2.6 MG/DL (ref 2.7–4.5)
PLATELET # BLD AUTO: 97 K/UL (ref 150–350)
PMV BLD AUTO: 8.7 FL (ref 9.2–12.9)
POTASSIUM SERPL-SCNC: 3.7 MMOL/L (ref 3.5–5.1)
PROT SERPL-MCNC: 7.2 G/DL (ref 6–8.4)
RBC # BLD AUTO: 3.46 M/UL (ref 4–5.4)
SODIUM SERPL-SCNC: 140 MMOL/L (ref 136–145)
WBC # BLD AUTO: 3.89 K/UL (ref 3.9–12.7)

## 2020-12-27 PROCEDURE — 36415 COLL VENOUS BLD VENIPUNCTURE: CPT

## 2020-12-27 PROCEDURE — 25000003 PHARM REV CODE 250: Performed by: STUDENT IN AN ORGANIZED HEALTH CARE EDUCATION/TRAINING PROGRAM

## 2020-12-27 PROCEDURE — 63600175 PHARM REV CODE 636 W HCPCS: Performed by: STUDENT IN AN ORGANIZED HEALTH CARE EDUCATION/TRAINING PROGRAM

## 2020-12-27 PROCEDURE — 84100 ASSAY OF PHOSPHORUS: CPT

## 2020-12-27 PROCEDURE — 80053 COMPREHEN METABOLIC PANEL: CPT

## 2020-12-27 PROCEDURE — 83735 ASSAY OF MAGNESIUM: CPT

## 2020-12-27 PROCEDURE — 99239 HOSP IP/OBS DSCHRG MGMT >30: CPT | Mod: ,,, | Performed by: INTERNAL MEDICINE

## 2020-12-27 PROCEDURE — 85025 COMPLETE CBC W/AUTO DIFF WBC: CPT

## 2020-12-27 PROCEDURE — 25000003 PHARM REV CODE 250: Performed by: EMERGENCY MEDICINE

## 2020-12-27 PROCEDURE — 99239 PR HOSPITAL DISCHARGE DAY,>30 MIN: ICD-10-PCS | Mod: ,,, | Performed by: INTERNAL MEDICINE

## 2020-12-27 RX ORDER — SODIUM,POTASSIUM PHOSPHATES 280-250MG
2 POWDER IN PACKET (EA) ORAL EVERY 4 HOURS
Status: DISCONTINUED | OUTPATIENT
Start: 2020-12-27 | End: 2020-12-27 | Stop reason: HOSPADM

## 2020-12-27 RX ORDER — OXYCODONE AND ACETAMINOPHEN 10; 325 MG/1; MG/1
1 TABLET ORAL EVERY 4 HOURS PRN
Status: DISCONTINUED | OUTPATIENT
Start: 2020-12-27 | End: 2020-12-27 | Stop reason: HOSPADM

## 2020-12-27 RX ORDER — MAGNESIUM SULFATE HEPTAHYDRATE 40 MG/ML
2 INJECTION, SOLUTION INTRAVENOUS ONCE
Status: COMPLETED | OUTPATIENT
Start: 2020-12-27 | End: 2020-12-27

## 2020-12-27 RX ORDER — HYDROCHLOROTHIAZIDE 25 MG/1
25 TABLET ORAL DAILY
Status: DISCONTINUED | OUTPATIENT
Start: 2020-12-27 | End: 2020-12-27 | Stop reason: HOSPADM

## 2020-12-27 RX ADMIN — MAGNESIUM SULFATE 2 G: 2 INJECTION INTRAVENOUS at 08:12

## 2020-12-27 RX ADMIN — AMLODIPINE BESYLATE 10 MG: 10 TABLET ORAL at 08:12

## 2020-12-27 RX ADMIN — FOLIC ACID 1 MG: 1 TABLET ORAL at 08:12

## 2020-12-27 RX ADMIN — CARVEDILOL 25 MG: 25 TABLET, FILM COATED ORAL at 08:12

## 2020-12-27 RX ADMIN — HYDROMORPHONE HYDROCHLORIDE 0.5 MG: 1 INJECTION, SOLUTION INTRAMUSCULAR; INTRAVENOUS; SUBCUTANEOUS at 12:12

## 2020-12-27 RX ADMIN — DOCUSATE SODIUM 50MG AND SENNOSIDES 8.6MG 1 TABLET: 8.6; 5 TABLET, FILM COATED ORAL at 08:12

## 2020-12-27 RX ADMIN — ACETAMINOPHEN 1000 MG: 500 TABLET ORAL at 06:12

## 2020-12-27 RX ADMIN — POTASSIUM BICARBONATE 40 MEQ: 391 TABLET, EFFERVESCENT ORAL at 08:12

## 2020-12-27 RX ADMIN — GABAPENTIN 800 MG: 400 CAPSULE ORAL at 08:12

## 2020-12-27 RX ADMIN — HYDROMORPHONE HYDROCHLORIDE 0.5 MG: 1 INJECTION, SOLUTION INTRAMUSCULAR; INTRAVENOUS; SUBCUTANEOUS at 04:12

## 2020-12-27 RX ADMIN — HYDROCHLOROTHIAZIDE 25 MG: 25 TABLET ORAL at 08:12

## 2020-12-27 RX ADMIN — APIXABAN 5 MG: 5 TABLET, FILM COATED ORAL at 08:12

## 2020-12-27 RX ADMIN — POTASSIUM & SODIUM PHOSPHATES POWDER PACK 280-160-250 MG 2 PACKET: 280-160-250 PACK at 10:12

## 2020-12-27 RX ADMIN — FLUOXETINE 40 MG: 20 CAPSULE ORAL at 08:12

## 2020-12-27 RX ADMIN — HYDROXYUREA 1000 MG: 500 CAPSULE ORAL at 08:12

## 2020-12-27 RX ADMIN — CELECOXIB 200 MG: 100 CAPSULE ORAL at 08:12

## 2020-12-29 ENCOUNTER — TELEPHONE (OUTPATIENT)
Dept: ORTHOPEDICS | Facility: CLINIC | Age: 40
End: 2020-12-29

## 2020-12-29 ENCOUNTER — PATIENT OUTREACH (OUTPATIENT)
Dept: ADMINISTRATIVE | Facility: CLINIC | Age: 40
End: 2020-12-29

## 2020-12-29 NOTE — PATIENT INSTRUCTIONS
"  Sickle Cell Anemia  You have sickle cell anemia, which is also called sickle cell disease. That means your red blood cells may lose their normal round shape and become shaped like a half-moon. These cells can't carry oxygen as well as normal, round blood cells. Sickle cell anemia runs in families, and commonly affects -Americans and certain other ethnic groups. Sickle cell anemia is a genetic disease you get from a mutated (changed) gene passed down from each parent. Sickle cell "trait" happens when you get one mutated gene from one of your parents. Sickle cell trait usually does not have symptoms and is not serious. Neither the disease nor the trait can be passed from person to person by coughing or touching. Sickle cell anemia can be controlled, but not cured. Most newborns are now tested for sickle cell disease at birth.  A sickle cell crisis happens when many sickle cells stick together and pile up in the blood vessels. During a sickle cell crisis, you may have severe pain in the chest, stomach, arms, and legs. The crisis can last for hours, or even days, and can happen several times a year.  Home care  Tips for taking care of yourself at home include:  · Watch for sores (ulcers) on your legs. These are caused by poor blood flow and are a sign that the sickle cell anemia is not under control.  · If you snore or sometimes stop breathing during sleep, be sure to tell your healthcare provider.  · Get treatment for any other medical condition, such as diabetes. This is important to avoid complications of sickle cell anemia.  · Get early prenatal care if you are pregnant or plan to get pregnant.  · If you plan to travel by air, go in pressurized aircraft only. Check with your healthcare provider about any needed safety steps if you must travel in a nonpressurized aircraft.  · Talk to your healthcare provider about what kind of pain medicine you should use.  · Drink plenty of water, especially during warm " weather.  · Get treated for any infection (cold, flu, skin infection) as soon as it happens.  · Wear warm clothes in cold weather or in air-conditioned rooms.  Lifestyle changes  Suggestions for changes include:  · Limit alcohol intake to no more than one drink per day.  · Stop smoking. Go to a stop-smoking program to improve your chances of quitting.  · Exercise regularly but not to the point that you become extremely tired. Drink plenty of fluids when you exercise.  · Avoid very strenuous activities, such as rough contact sports.  · Don't swim in cold water.   Follow-up care  Make a follow-up appointment as directed by our staff. Regular follow-up visits are very important.  When to call your healthcare provider  Call your healthcare provider right away if you have any of the following:  · Swollen hands or feet  · Sudden paleness in the skin or nail beds  · Yellow color of the skin or eyes (jaundice)  · Fever or signs of infection  · Swelling in the belly  · Sudden tiredness with no interest in what is going on  · Erection that won't go away  · Trouble hearing or seeing  · Weakness on one side of the body  · Sudden change in speech  · Headache  · Trouble breathing  · Joint, stomach, chest, or muscle pain  · Limping     © 8204-7334 [employer]. [address] [place] [address], [place] [address]. All rights reserved. This information is not intended as a substitute for professional medical care. Always follow your healthcare professional's instructions.

## 2020-12-29 NOTE — TELEPHONE ENCOUNTER
I left a message for the patient that we do not take medicaid in our Ortho Clinic. I left the number to [facility] in  and [facility] in [place].

## 2020-12-29 NOTE — TELEPHONE ENCOUNTER
----- Message from Lakeisha Kuo PA-C sent at 12/29/2020  3:59 PM CST -----  Regarding: RE: Patient  Denise Lomeli & Boaz,   I wouldn't mind seeing her but unfortunately she has Medicaid and we don't take Medicaid at [facility] for ortho. Boaz can you see if you can call her and get her in to see Irina at [facility] (if he still accepts Medicaid) or follow Yeni's recommendations below to have her go to [facility] ortho in [place] or [facility] where they accept Medicaid? If she wants [facility] then she will have to wait to see Dr. Whittaker at [facility].  Thanks!   Lakeisha      ----- Message -----  From: Armani Adame MA  Sent: 12/29/2020   1:50 PM CST  To: Lakeisha Kuo PA-C, Shiela Suazo Staff  Subject: Patient                                          Denise,    Would you mind getting this patient in to see you or one of the PA's over there?    Thank you,  Armani Adame MA  Medical Assistant to TONY Cordero MD  ----- Message -----  From: Yeni Preston LPN  Sent: 12/29/2020  12:04 PM CST  To: Anayeli Huertas MD, Armani Adame MA, #    We have no appts available at [facility] under Medicaid. /[facility] might see this pt, but I understand that he is booked into February. Will attach his staff in case. Otherwise a referral to [facility] [place] or [facility] may be an option.   Thank you,Yeni  [id number]  ----- Message -----  From: Armani Adame MA  Sent: 12/29/2020  11:06 AM CST  To: DEVAN Crooks,    Can this be seen at [facility]?    Thank you  Armani Adame MA  Medical Assistant to TONY Cordero MD  ----- Message -----  From: Armani Adame MA  Sent: 12/29/2020   8:40 AM CST  To: Rich Yanes PA-C    Please schedule for patient to see Greg    Thanks!  Armani Adame MA  Medical Assistant to TONY Cordero MD  ----- Message -----  From: Anayeli Huertas MD  Sent: 12/28/2020   8:22 PM CST  To: Gil Santizo Staff    Patient with right   knee  pain, MRI with meniscus  tear. Please schedule to see  ortho PA

## 2020-12-30 ENCOUNTER — TELEPHONE (OUTPATIENT)
Dept: HEMATOLOGY/ONCOLOGY | Facility: CLINIC | Age: 40
End: 2020-12-30

## 2020-12-30 LAB — BACTERIA SPEC ANAEROBE CULT: NORMAL

## 2020-12-31 ENCOUNTER — TELEPHONE (OUTPATIENT)
Dept: ORTHOPEDICS | Facility: CLINIC | Age: 40
End: 2020-12-31

## 2020-12-31 ENCOUNTER — TELEPHONE (OUTPATIENT)
Dept: HEMATOLOGY/ONCOLOGY | Facility: CLINIC | Age: 40
End: 2020-12-31

## 2020-12-31 NOTE — TELEPHONE ENCOUNTER
----- Message from Halle Hernandes sent at 12/31/2020  2:33 PM CST -----  Regarding: PT  Contact: PT  PT called to see about making an appointment and getting some labs done bc she was recently released from the hospital but still isn't feeling good. Please call back     Callback: [phone]

## 2020-12-31 NOTE — TELEPHONE ENCOUNTER
----- Message from Armani Adame MA sent at 12/31/2020  9:02 AM CST -----  Regarding: FW: Patient  Good morning,    Could you get the attached patient scheduled with Dr. Chahal?    Thank you!  Armani Adame MA  Medical Assistant to TONY Cordero MD  ----- Message -----  From: Lakeisha Kuo PA-C  Sent: 12/29/2020   3:59 PM CST  To: Armani Adame MA, Shiela Suazo Staff  Subject: RE: Patient                                      Denise Lomeli & Boaz,   I wouldn't mind seeing her but unfortunately she has Medicaid and we don't take Medicaid at [facility] for ortho. Boaz can you see if you can call her and get her in to see Irina at [facility] (if he still accepts Medicaid) or follow Yeni's recommendations below to have her go to [facility] ortho in [place] or [facility] where they accept Medicaid? If she wants [facility] then she will have to wait to see Dr. Chahal at [facility].  Thanks!   Lakeisha      ----- Message -----  From: Armani Adame MA  Sent: 12/29/2020   1:50 PM CST  To: Lakeisha Kuo PA-C, Shiela Suazo Staff  Subject: Patient                                          Denise,    Would you mind getting this patient in to see you or one of the PA's over there?    Thank you,  Armani Adame MA  Medical Assistant to TONY Cordero MD  ----- Message -----  From: Yeni Preston LPN  Sent: 12/29/2020  12:04 PM CST  To: Anayeli Huertas MD, Armani Adame MA, #    We have no appts available at [facility] under Medicaid. /[facility] might see this pt, but I understand that he is booked into February. Will attach his staff in case. Otherwise a referral to [facility] [place] or [facility] may be an option.   Thank you,Yeni  [phone]  ----- Message -----  From: Armani Adame MA  Sent: 12/29/2020  11:06 AM CST  To: YeniDEVAN Goldberg,    Can this be seen at [facility]?    Thank you  Armani Adame MA  Medical Assistant to TONY Cordero MD  ----- Message  -----  From: Armani Adame MA  Sent: 12/29/2020   8:40 AM CST  To: Rich Yanes PA-C    Please schedule for patient to see Greg Saucedo!  Armani Adame MA  Medical Assistant to TONY Cordero MD  ----- Message -----  From: Anayeli Huertas MD  Sent: 12/28/2020   8:22 PM CST  To: Gil Santizo Staff    Patient with right  knee  pain, MRI with meniscus  tear. Please schedule to see  ortho PA

## 2021-01-05 ENCOUNTER — INFUSION (OUTPATIENT)
Dept: INFUSION THERAPY | Facility: HOSPITAL | Age: 41
End: 2021-01-05
Attending: INTERNAL MEDICINE
Payer: MEDICAID

## 2021-01-05 ENCOUNTER — TELEPHONE (OUTPATIENT)
Dept: HEMATOLOGY/ONCOLOGY | Facility: CLINIC | Age: 41
End: 2021-01-05

## 2021-01-05 VITALS
HEART RATE: 80 BPM | SYSTOLIC BLOOD PRESSURE: 130 MMHG | DIASTOLIC BLOOD PRESSURE: 81 MMHG | RESPIRATION RATE: 16 BRPM | TEMPERATURE: 99 F | OXYGEN SATURATION: 97 %

## 2021-01-05 DIAGNOSIS — D57.00 HB-SS DISEASE WITH VASO-OCCLUSIVE PAIN: ICD-10-CM

## 2021-01-05 DIAGNOSIS — D50.0 IRON DEFICIENCY ANEMIA DUE TO CHRONIC BLOOD LOSS: Primary | ICD-10-CM

## 2021-01-05 PROCEDURE — 25000003 PHARM REV CODE 250: Performed by: NURSE PRACTITIONER

## 2021-01-05 PROCEDURE — 96374 THER/PROPH/DIAG INJ IV PUSH: CPT

## 2021-01-05 PROCEDURE — 96375 TX/PRO/DX INJ NEW DRUG ADDON: CPT

## 2021-01-05 PROCEDURE — 63600175 PHARM REV CODE 636 W HCPCS: Mod: JG | Performed by: NURSE PRACTITIONER

## 2021-01-05 PROCEDURE — 63600175 PHARM REV CODE 636 W HCPCS: Performed by: NURSE PRACTITIONER

## 2021-01-05 PROCEDURE — 96361 HYDRATE IV INFUSION ADD-ON: CPT

## 2021-01-05 RX ORDER — DIPHENHYDRAMINE HYDROCHLORIDE 50 MG/ML
50 INJECTION INTRAMUSCULAR; INTRAVENOUS ONCE AS NEEDED
Status: CANCELLED | OUTPATIENT
Start: 2021-01-05

## 2021-01-05 RX ORDER — MORPHINE SULFATE 2 MG/ML
2 INJECTION, SOLUTION INTRAMUSCULAR; INTRAVENOUS ONCE AS NEEDED
Status: COMPLETED | OUTPATIENT
Start: 2021-01-05 | End: 2021-01-05

## 2021-01-05 RX ORDER — DIPHENHYDRAMINE HYDROCHLORIDE 50 MG/ML
25 INJECTION INTRAMUSCULAR; INTRAVENOUS ONCE
Status: CANCELLED | OUTPATIENT
Start: 2021-01-05

## 2021-01-05 RX ORDER — SODIUM CHLORIDE 9 MG/ML
INJECTION, SOLUTION INTRAVENOUS CONTINUOUS
Status: DISCONTINUED | OUTPATIENT
Start: 2021-01-05 | End: 2021-01-05 | Stop reason: HOSPADM

## 2021-01-05 RX ORDER — METHYLPREDNISOLONE SOD SUCC 125 MG
125 VIAL (EA) INJECTION ONCE AS NEEDED
Status: DISCONTINUED | OUTPATIENT
Start: 2021-01-05 | End: 2021-01-05 | Stop reason: HOSPADM

## 2021-01-05 RX ORDER — DIPHENHYDRAMINE HYDROCHLORIDE 50 MG/ML
25 INJECTION INTRAMUSCULAR; INTRAVENOUS ONCE
Status: COMPLETED | OUTPATIENT
Start: 2021-01-05 | End: 2021-01-05

## 2021-01-05 RX ORDER — SODIUM CHLORIDE 0.9 % (FLUSH) 0.9 %
10 SYRINGE (ML) INJECTION
Status: DISCONTINUED | OUTPATIENT
Start: 2021-01-05 | End: 2021-01-05 | Stop reason: HOSPADM

## 2021-01-05 RX ORDER — EPINEPHRINE 0.3 MG/.3ML
0.3 INJECTION SUBCUTANEOUS ONCE AS NEEDED
Status: DISCONTINUED | OUTPATIENT
Start: 2021-01-05 | End: 2021-01-05 | Stop reason: HOSPADM

## 2021-01-05 RX ORDER — MORPHINE SULFATE 2 MG/ML
2 INJECTION, SOLUTION INTRAMUSCULAR; INTRAVENOUS ONCE AS NEEDED
Status: CANCELLED | OUTPATIENT
Start: 2021-01-05

## 2021-01-05 RX ORDER — HEPARIN 100 UNIT/ML
500 SYRINGE INTRAVENOUS
Status: DISCONTINUED | OUTPATIENT
Start: 2021-01-05 | End: 2021-01-05 | Stop reason: HOSPADM

## 2021-01-05 RX ORDER — METHYLPREDNISOLONE SOD SUCC 125 MG
125 VIAL (EA) INJECTION ONCE AS NEEDED
Status: CANCELLED | OUTPATIENT
Start: 2021-01-05

## 2021-01-05 RX ORDER — SODIUM CHLORIDE 0.9 % (FLUSH) 0.9 %
10 SYRINGE (ML) INJECTION
Status: CANCELLED | OUTPATIENT
Start: 2021-01-05

## 2021-01-05 RX ORDER — HEPARIN 100 UNIT/ML
500 SYRINGE INTRAVENOUS
Status: CANCELLED | OUTPATIENT
Start: 2021-01-05

## 2021-01-05 RX ORDER — DIPHENHYDRAMINE HYDROCHLORIDE 50 MG/ML
50 INJECTION INTRAMUSCULAR; INTRAVENOUS ONCE AS NEEDED
Status: DISCONTINUED | OUTPATIENT
Start: 2021-01-05 | End: 2021-01-05 | Stop reason: HOSPADM

## 2021-01-05 RX ORDER — MORPHINE SULFATE 2 MG/ML
1 INJECTION, SOLUTION INTRAMUSCULAR; INTRAVENOUS ONCE
Status: COMPLETED | OUTPATIENT
Start: 2021-01-05 | End: 2021-01-05

## 2021-01-05 RX ORDER — EPINEPHRINE 0.3 MG/.3ML
0.3 INJECTION SUBCUTANEOUS ONCE AS NEEDED
Status: CANCELLED | OUTPATIENT
Start: 2021-01-05

## 2021-01-05 RX ORDER — HEPARIN 100 UNIT/ML
5 SYRINGE INTRAVENOUS
Status: DISCONTINUED | OUTPATIENT
Start: 2021-01-05 | End: 2021-01-05 | Stop reason: HOSPADM

## 2021-01-05 RX ORDER — HEPARIN 100 UNIT/ML
5 SYRINGE INTRAVENOUS
Status: CANCELLED | OUTPATIENT
Start: 2021-01-05

## 2021-01-05 RX ORDER — SODIUM CHLORIDE 9 MG/ML
INJECTION, SOLUTION INTRAVENOUS CONTINUOUS
Status: CANCELLED | OUTPATIENT
Start: 2021-01-05

## 2021-01-05 RX ADMIN — MORPHINE SULFATE 2 MG: 2 INJECTION, SOLUTION INTRAMUSCULAR; INTRAVENOUS at 11:01

## 2021-01-05 RX ADMIN — FERRIC CARBOXYMALTOSE INJECTION 750 MG: 50 INJECTION, SOLUTION INTRAVENOUS at 12:01

## 2021-01-05 RX ADMIN — SODIUM CHLORIDE 1000 ML: 0.9 INJECTION, SOLUTION INTRAVENOUS at 11:01

## 2021-01-05 RX ADMIN — DIPHENHYDRAMINE HYDROCHLORIDE 25 MG: 50 INJECTION INTRAMUSCULAR; INTRAVENOUS at 11:01

## 2021-01-05 RX ADMIN — MORPHINE SULFATE 1 MG: 2 INJECTION, SOLUTION INTRAMUSCULAR; INTRAVENOUS at 01:01

## 2021-01-12 DIAGNOSIS — M62.838 MUSCLE SPASM: Primary | ICD-10-CM

## 2021-01-13 RX ORDER — CYCLOBENZAPRINE HCL 10 MG
10 TABLET ORAL 3 TIMES DAILY
Qty: 30 TABLET | Refills: 2 | Status: SHIPPED | OUTPATIENT
Start: 2021-01-13 | End: 2021-03-11

## 2021-01-14 ENCOUNTER — TELEPHONE (OUTPATIENT)
Dept: HEMATOLOGY/ONCOLOGY | Facility: CLINIC | Age: 41
End: 2021-01-14

## 2021-01-14 ENCOUNTER — PATIENT MESSAGE (OUTPATIENT)
Dept: HEMATOLOGY/ONCOLOGY | Facility: CLINIC | Age: 41
End: 2021-01-14

## 2021-01-14 DIAGNOSIS — D57.00 HB-SS DISEASE WITH CRISIS: ICD-10-CM

## 2021-01-14 DIAGNOSIS — G50.0 TRIGEMINAL NEURALGIA: ICD-10-CM

## 2021-01-14 RX ORDER — OXYCODONE AND ACETAMINOPHEN 10; 325 MG/1; MG/1
1 TABLET ORAL EVERY 4 HOURS PRN
Qty: 120 TABLET | Refills: 0 | Status: SHIPPED | OUTPATIENT
Start: 2021-01-14 | End: 2021-02-01 | Stop reason: SDUPTHER

## 2021-01-14 RX ORDER — CARBAMAZEPINE 200 MG/1
800 TABLET ORAL 2 TIMES DAILY
Qty: 240 TABLET | Refills: 5 | Status: SHIPPED | OUTPATIENT
Start: 2021-01-14 | End: 2021-10-25

## 2021-01-14 RX ORDER — OXYCODONE AND ACETAMINOPHEN 10; 325 MG/1; MG/1
1 TABLET ORAL EVERY 6 HOURS PRN
Qty: 120 TABLET | Refills: 0 | Status: CANCELLED | OUTPATIENT
Start: 2021-01-14 | End: 2022-01-14

## 2021-01-14 RX ORDER — CARBAMAZEPINE 200 MG/1
800 TABLET ORAL 2 TIMES DAILY
Qty: 240 TABLET | Refills: 5 | Status: CANCELLED | OUTPATIENT
Start: 2021-01-14

## 2021-01-26 LAB — FUNGUS SPEC CULT: NORMAL

## 2021-02-01 ENCOUNTER — TELEPHONE (OUTPATIENT)
Dept: HEMATOLOGY/ONCOLOGY | Facility: CLINIC | Age: 41
End: 2021-02-01

## 2021-02-01 ENCOUNTER — PATIENT MESSAGE (OUTPATIENT)
Dept: HEMATOLOGY/ONCOLOGY | Facility: CLINIC | Age: 41
End: 2021-02-01

## 2021-02-01 DIAGNOSIS — D57.00 HB-SS DISEASE WITH CRISIS: ICD-10-CM

## 2021-02-01 DIAGNOSIS — D57.00 SICKLE-CELL DISEASE WITH PAIN: ICD-10-CM

## 2021-02-01 RX ORDER — HYDROXYUREA 500 MG/1
1000 CAPSULE ORAL 2 TIMES DAILY
Qty: 120 CAPSULE | Refills: 5 | Status: SHIPPED | OUTPATIENT
Start: 2021-02-01 | End: 2021-08-03

## 2021-02-01 RX ORDER — OXYCODONE AND ACETAMINOPHEN 10; 325 MG/1; MG/1
1 TABLET ORAL EVERY 4 HOURS PRN
Qty: 120 TABLET | Refills: 0 | Status: SHIPPED | OUTPATIENT
Start: 2021-02-01 | End: 2021-02-18 | Stop reason: SDUPTHER

## 2021-02-11 DIAGNOSIS — D57.00 SICKLE-CELL DISEASE WITH PAIN: Primary | ICD-10-CM

## 2021-02-11 RX ORDER — CYCLOBENZAPRINE HCL 10 MG
10 TABLET ORAL 3 TIMES DAILY PRN
Qty: 45 TABLET | Refills: 2 | Status: SHIPPED | OUTPATIENT
Start: 2021-02-11 | End: 2021-02-21

## 2021-02-12 ENCOUNTER — PATIENT MESSAGE (OUTPATIENT)
Dept: HEMATOLOGY/ONCOLOGY | Facility: CLINIC | Age: 41
End: 2021-02-12

## 2021-02-18 ENCOUNTER — PATIENT MESSAGE (OUTPATIENT)
Dept: HEMATOLOGY/ONCOLOGY | Facility: CLINIC | Age: 41
End: 2021-02-18

## 2021-02-18 DIAGNOSIS — D57.1 HB-SS DISEASE WITHOUT CRISIS: ICD-10-CM

## 2021-02-18 DIAGNOSIS — D57.00 HB-SS DISEASE WITH CRISIS: ICD-10-CM

## 2021-02-18 DIAGNOSIS — G50.0 TRIGEMINAL NEURALGIA: ICD-10-CM

## 2021-02-18 RX ORDER — GABAPENTIN 400 MG/1
800 CAPSULE ORAL 3 TIMES DAILY
Qty: 180 CAPSULE | Refills: 0 | Status: SHIPPED | OUTPATIENT
Start: 2021-02-18 | End: 2021-05-30 | Stop reason: SDUPTHER

## 2021-02-18 RX ORDER — OXYCODONE AND ACETAMINOPHEN 10; 325 MG/1; MG/1
1 TABLET ORAL EVERY 4 HOURS PRN
Qty: 120 TABLET | Refills: 0 | Status: SHIPPED | OUTPATIENT
Start: 2021-02-18 | End: 2021-03-09

## 2021-02-19 DIAGNOSIS — D57.00 HB-SS DISEASE WITH CRISIS: ICD-10-CM

## 2021-02-19 RX ORDER — OXYCODONE AND ACETAMINOPHEN 10; 325 MG/1; MG/1
1 TABLET ORAL EVERY 4 HOURS PRN
Qty: 120 TABLET | Refills: 0 | Status: SHIPPED | OUTPATIENT
Start: 2021-02-19 | End: 2021-03-09

## 2021-02-23 ENCOUNTER — TELEPHONE (OUTPATIENT)
Dept: HEMATOLOGY/ONCOLOGY | Facility: CLINIC | Age: 41
End: 2021-02-23

## 2021-02-23 ENCOUNTER — HOSPITAL ENCOUNTER (INPATIENT)
Facility: HOSPITAL | Age: 41
LOS: 7 days | Discharge: HOME | DRG: 811 | End: 2021-03-02
Attending: EMERGENCY MEDICINE | Admitting: STUDENT IN AN ORGANIZED HEALTH CARE EDUCATION/TRAINING PROGRAM
Payer: MEDICAID

## 2021-02-23 DIAGNOSIS — J81.1 PULMONARY EDEMA: ICD-10-CM

## 2021-02-23 DIAGNOSIS — D57.00 SICKLE CELL ANEMIA WITH CRISIS: Primary | ICD-10-CM

## 2021-02-23 DIAGNOSIS — R50.9 LOW GRADE FEVER: ICD-10-CM

## 2021-02-23 DIAGNOSIS — R09.02 HYPOXIA: ICD-10-CM

## 2021-02-23 LAB
ALBUMIN SERPL BCP-MCNC: 4 G/DL (ref 3.5–5.2)
ALP SERPL-CCNC: 76 U/L (ref 55–135)
ALT SERPL W/O P-5'-P-CCNC: 48 U/L (ref 10–44)
ANION GAP SERPL CALC-SCNC: 9 MMOL/L (ref 8–16)
AST SERPL-CCNC: 58 U/L (ref 10–40)
B-HCG UR QL: NEGATIVE
BACTERIA #/AREA URNS AUTO: ABNORMAL /HPF
BASOPHILS # BLD AUTO: 0.04 K/UL (ref 0–0.2)
BASOPHILS NFR BLD: 0.5 % (ref 0–1.9)
BILIRUB SERPL-MCNC: 0.5 MG/DL (ref 0.1–1)
BILIRUB UR QL STRIP: NEGATIVE
BUN SERPL-MCNC: 13 MG/DL (ref 6–20)
CALCIUM SERPL-MCNC: 9 MG/DL (ref 8.7–10.5)
CHLORIDE SERPL-SCNC: 102 MMOL/L (ref 95–110)
CLARITY UR REFRACT.AUTO: ABNORMAL
CO2 SERPL-SCNC: 29 MMOL/L (ref 23–29)
COLOR UR AUTO: YELLOW
CREAT SERPL-MCNC: 0.8 MG/DL (ref 0.5–1.4)
CTP QC/QA: YES
CTP QC/QA: YES
DIFFERENTIAL METHOD: ABNORMAL
EOSINOPHIL # BLD AUTO: 0.1 K/UL (ref 0–0.5)
EOSINOPHIL NFR BLD: 1.5 % (ref 0–8)
ERYTHROCYTE [DISTWIDTH] IN BLOOD BY AUTOMATED COUNT: 22.5 % (ref 11.5–14.5)
EST. GFR  (AFRICAN AMERICAN): >60 ML/MIN/1.73 M^2
EST. GFR  (NON AFRICAN AMERICAN): >60 ML/MIN/1.73 M^2
GLUCOSE SERPL-MCNC: 126 MG/DL (ref 70–110)
GLUCOSE UR QL STRIP: NEGATIVE
HCT VFR BLD AUTO: 32.6 % (ref 37–48.5)
HGB BLD-MCNC: 11.2 G/DL (ref 12–16)
HGB UR QL STRIP: NEGATIVE
IMM GRANULOCYTES # BLD AUTO: 0.02 K/UL (ref 0–0.04)
IMM GRANULOCYTES NFR BLD AUTO: 0.2 % (ref 0–0.5)
KETONES UR QL STRIP: NEGATIVE
LEUKOCYTE ESTERASE UR QL STRIP: ABNORMAL
LYMPHOCYTES # BLD AUTO: 3.6 K/UL (ref 1–4.8)
LYMPHOCYTES NFR BLD: 41.2 % (ref 18–48)
MCH RBC QN AUTO: 29.9 PG (ref 27–31)
MCHC RBC AUTO-ENTMCNC: 34.4 G/DL (ref 32–36)
MCV RBC AUTO: 87 FL (ref 82–98)
MICROSCOPIC COMMENT: ABNORMAL
MONOCYTES # BLD AUTO: 1 K/UL (ref 0.3–1)
MONOCYTES NFR BLD: 11.7 % (ref 4–15)
NEUTROPHILS # BLD AUTO: 3.9 K/UL (ref 1.8–7.7)
NEUTROPHILS NFR BLD: 44.9 % (ref 38–73)
NITRITE UR QL STRIP: NEGATIVE
NRBC BLD-RTO: 13 /100 WBC
PH UR STRIP: 5 [PH] (ref 5–8)
PLATELET # BLD AUTO: 262 K/UL (ref 150–350)
PMV BLD AUTO: 9.2 FL (ref 9.2–12.9)
POTASSIUM SERPL-SCNC: 3.7 MMOL/L (ref 3.5–5.1)
PROT SERPL-MCNC: 7.7 G/DL (ref 6–8.4)
PROT UR QL STRIP: NEGATIVE
RBC # BLD AUTO: 3.74 M/UL (ref 4–5.4)
RBC #/AREA URNS AUTO: 0 /HPF (ref 0–4)
RETICS/RBC NFR AUTO: 5.3 % (ref 0.5–2.5)
SARS-COV-2 RDRP RESP QL NAA+PROBE: NEGATIVE
SODIUM SERPL-SCNC: 140 MMOL/L (ref 136–145)
SP GR UR STRIP: 1.01 (ref 1–1.03)
SQUAMOUS #/AREA URNS AUTO: 8 /HPF
URN SPEC COLLECT METH UR: ABNORMAL
WBC # BLD AUTO: 8.65 K/UL (ref 3.9–12.7)
WBC #/AREA URNS AUTO: 3 /HPF (ref 0–5)

## 2021-02-23 PROCEDURE — 63600175 PHARM REV CODE 636 W HCPCS: Performed by: PHYSICIAN ASSISTANT

## 2021-02-23 PROCEDURE — 86803 HEPATITIS C AB TEST: CPT

## 2021-02-23 PROCEDURE — 25000242 PHARM REV CODE 250 ALT 637 W/ HCPCS: Performed by: PHYSICIAN ASSISTANT

## 2021-02-23 PROCEDURE — 85025 COMPLETE CBC W/AUTO DIFF WBC: CPT

## 2021-02-23 PROCEDURE — 96361 HYDRATE IV INFUSION ADD-ON: CPT

## 2021-02-23 PROCEDURE — 81001 URINALYSIS AUTO W/SCOPE: CPT

## 2021-02-23 PROCEDURE — 96375 TX/PRO/DX INJ NEW DRUG ADDON: CPT

## 2021-02-23 PROCEDURE — 25000003 PHARM REV CODE 250: Performed by: PHYSICIAN ASSISTANT

## 2021-02-23 PROCEDURE — 99285 PR EMERGENCY DEPT VISIT,LEVEL V: ICD-10-PCS | Mod: CR,CS,, | Performed by: PHYSICIAN ASSISTANT

## 2021-02-23 PROCEDURE — 96374 THER/PROPH/DIAG INJ IV PUSH: CPT

## 2021-02-23 PROCEDURE — 94760 N-INVAS EAR/PLS OXIMETRY 1: CPT

## 2021-02-23 PROCEDURE — 99285 EMERGENCY DEPT VISIT HI MDM: CPT | Mod: CR,CS,, | Performed by: PHYSICIAN ASSISTANT

## 2021-02-23 PROCEDURE — 25000003 PHARM REV CODE 250: Performed by: STUDENT IN AN ORGANIZED HEALTH CARE EDUCATION/TRAINING PROGRAM

## 2021-02-23 PROCEDURE — 63600175 PHARM REV CODE 636 W HCPCS: Performed by: HOSPITALIST

## 2021-02-23 PROCEDURE — 63600175 PHARM REV CODE 636 W HCPCS: Performed by: EMERGENCY MEDICINE

## 2021-02-23 PROCEDURE — 99285 EMERGENCY DEPT VISIT HI MDM: CPT | Mod: 25

## 2021-02-23 PROCEDURE — 96376 TX/PRO/DX INJ SAME DRUG ADON: CPT

## 2021-02-23 PROCEDURE — 85045 AUTOMATED RETICULOCYTE COUNT: CPT

## 2021-02-23 PROCEDURE — 81025 URINE PREGNANCY TEST: CPT | Performed by: PHYSICIAN ASSISTANT

## 2021-02-23 PROCEDURE — 12000002 HC ACUTE/MED SURGE SEMI-PRIVATE ROOM

## 2021-02-23 PROCEDURE — 80053 COMPREHEN METABOLIC PANEL: CPT

## 2021-02-23 PROCEDURE — 63600175 PHARM REV CODE 636 W HCPCS: Performed by: STUDENT IN AN ORGANIZED HEALTH CARE EDUCATION/TRAINING PROGRAM

## 2021-02-23 PROCEDURE — U0002 COVID-19 LAB TEST NON-CDC: HCPCS | Performed by: PHYSICIAN ASSISTANT

## 2021-02-23 PROCEDURE — 94640 AIRWAY INHALATION TREATMENT: CPT

## 2021-02-23 PROCEDURE — 86703 HIV-1/HIV-2 1 RESULT ANTBDY: CPT

## 2021-02-23 RX ORDER — HYDROMORPHONE HYDROCHLORIDE 1 MG/ML
0.5 INJECTION, SOLUTION INTRAMUSCULAR; INTRAVENOUS; SUBCUTANEOUS
Status: DISCONTINUED | OUTPATIENT
Start: 2021-02-23 | End: 2021-02-24

## 2021-02-23 RX ORDER — FLUOXETINE HYDROCHLORIDE 20 MG/1
40 CAPSULE ORAL DAILY
Status: DISCONTINUED | OUTPATIENT
Start: 2021-02-24 | End: 2021-03-02 | Stop reason: HOSPADM

## 2021-02-23 RX ORDER — HYDROCHLOROTHIAZIDE 12.5 MG/1
25 TABLET ORAL DAILY
Status: DISCONTINUED | OUTPATIENT
Start: 2021-02-24 | End: 2021-02-24

## 2021-02-23 RX ORDER — HYDROMORPHONE HCL IN 0.9% NACL 6 MG/30 ML
PATIENT CONTROLLED ANALGESIA SYRINGE INTRAVENOUS CONTINUOUS
Status: DISCONTINUED | OUTPATIENT
Start: 2021-02-23 | End: 2021-02-27

## 2021-02-23 RX ORDER — FLUTICASONE PROPIONATE 50 MCG
1 SPRAY, SUSPENSION (ML) NASAL DAILY
Status: DISCONTINUED | OUTPATIENT
Start: 2021-02-24 | End: 2021-03-02 | Stop reason: HOSPADM

## 2021-02-23 RX ORDER — DIPHENHYDRAMINE HCL 25 MG
25 CAPSULE ORAL EVERY 4 HOURS PRN
Status: DISCONTINUED | OUTPATIENT
Start: 2021-02-23 | End: 2021-02-23

## 2021-02-23 RX ORDER — CYCLOBENZAPRINE HCL 5 MG
10 TABLET ORAL 3 TIMES DAILY PRN
Status: DISCONTINUED | OUTPATIENT
Start: 2021-02-23 | End: 2021-03-02 | Stop reason: HOSPADM

## 2021-02-23 RX ORDER — POLYETHYLENE GLYCOL 3350 17 G/17G
17 POWDER, FOR SOLUTION ORAL DAILY
Status: DISCONTINUED | OUTPATIENT
Start: 2021-02-24 | End: 2021-02-24

## 2021-02-23 RX ORDER — HYDROMORPHONE HYDROCHLORIDE 1 MG/ML
2 INJECTION, SOLUTION INTRAMUSCULAR; INTRAVENOUS; SUBCUTANEOUS
Status: COMPLETED | OUTPATIENT
Start: 2021-02-23 | End: 2021-02-23

## 2021-02-23 RX ORDER — ENOXAPARIN SODIUM 100 MG/ML
40 INJECTION SUBCUTANEOUS EVERY 24 HOURS
Status: DISCONTINUED | OUTPATIENT
Start: 2021-02-23 | End: 2021-02-23

## 2021-02-23 RX ORDER — DIPHENHYDRAMINE HCL 50 MG
50 CAPSULE ORAL
Status: COMPLETED | OUTPATIENT
Start: 2021-02-23 | End: 2021-02-23

## 2021-02-23 RX ORDER — NALOXONE HCL 0.4 MG/ML
0.02 VIAL (ML) INJECTION
Status: DISCONTINUED | OUTPATIENT
Start: 2021-02-23 | End: 2021-02-24

## 2021-02-23 RX ORDER — ONDANSETRON 2 MG/ML
4 INJECTION INTRAMUSCULAR; INTRAVENOUS
Status: COMPLETED | OUTPATIENT
Start: 2021-02-23 | End: 2021-02-23

## 2021-02-23 RX ORDER — ONDANSETRON 8 MG/1
8 TABLET, ORALLY DISINTEGRATING ORAL EVERY 8 HOURS PRN
Status: DISCONTINUED | OUTPATIENT
Start: 2021-02-23 | End: 2021-03-02 | Stop reason: HOSPADM

## 2021-02-23 RX ORDER — CARBAMAZEPINE 200 MG/1
800 TABLET ORAL 2 TIMES DAILY
Status: DISCONTINUED | OUTPATIENT
Start: 2021-02-23 | End: 2021-02-27

## 2021-02-23 RX ORDER — IPRATROPIUM BROMIDE AND ALBUTEROL SULFATE 2.5; .5 MG/3ML; MG/3ML
3 SOLUTION RESPIRATORY (INHALATION)
Status: COMPLETED | OUTPATIENT
Start: 2021-02-23 | End: 2021-02-23

## 2021-02-23 RX ORDER — HYDROMORPHONE HYDROCHLORIDE 1 MG/ML
1 INJECTION, SOLUTION INTRAMUSCULAR; INTRAVENOUS; SUBCUTANEOUS
Status: COMPLETED | OUTPATIENT
Start: 2021-02-23 | End: 2021-02-23

## 2021-02-23 RX ORDER — DIPHENHYDRAMINE HYDROCHLORIDE 50 MG/ML
6.25 INJECTION INTRAMUSCULAR; INTRAVENOUS EVERY 4 HOURS PRN
Status: DISCONTINUED | OUTPATIENT
Start: 2021-02-23 | End: 2021-02-24

## 2021-02-23 RX ORDER — ALBUTEROL SULFATE 90 UG/1
2 AEROSOL, METERED RESPIRATORY (INHALATION) EVERY 6 HOURS PRN
Status: DISCONTINUED | OUTPATIENT
Start: 2021-02-23 | End: 2021-03-02 | Stop reason: HOSPADM

## 2021-02-23 RX ORDER — CARVEDILOL 25 MG/1
25 TABLET ORAL 2 TIMES DAILY
Status: DISCONTINUED | OUTPATIENT
Start: 2021-02-23 | End: 2021-03-02 | Stop reason: HOSPADM

## 2021-02-23 RX ORDER — NALOXONE HCL 0.4 MG/ML
0.02 VIAL (ML) INJECTION
Status: CANCELLED | OUTPATIENT
Start: 2021-02-23

## 2021-02-23 RX ORDER — AMLODIPINE BESYLATE 10 MG/1
10 TABLET ORAL DAILY
Status: DISCONTINUED | OUTPATIENT
Start: 2021-02-24 | End: 2021-03-02 | Stop reason: HOSPADM

## 2021-02-23 RX ORDER — FOLIC ACID 1 MG/1
1 TABLET ORAL DAILY
Status: DISCONTINUED | OUTPATIENT
Start: 2021-02-24 | End: 2021-03-02 | Stop reason: HOSPADM

## 2021-02-23 RX ORDER — SODIUM CHLORIDE 9 MG/ML
INJECTION, SOLUTION INTRAVENOUS CONTINUOUS
Status: ACTIVE | OUTPATIENT
Start: 2021-02-23 | End: 2021-02-24

## 2021-02-23 RX ORDER — SODIUM CHLORIDE 0.9 % (FLUSH) 0.9 %
10 SYRINGE (ML) INJECTION
Status: DISCONTINUED | OUTPATIENT
Start: 2021-02-23 | End: 2021-03-02 | Stop reason: HOSPADM

## 2021-02-23 RX ORDER — HYDROXYUREA 500 MG/1
1000 CAPSULE ORAL 2 TIMES DAILY
Status: DISCONTINUED | OUTPATIENT
Start: 2021-02-23 | End: 2021-03-02 | Stop reason: HOSPADM

## 2021-02-23 RX ORDER — IPRATROPIUM BROMIDE AND ALBUTEROL SULFATE 2.5; .5 MG/3ML; MG/3ML
3 SOLUTION RESPIRATORY (INHALATION) EVERY 6 HOURS PRN
Status: DISCONTINUED | OUTPATIENT
Start: 2021-02-23 | End: 2021-02-26

## 2021-02-23 RX ORDER — ACETAMINOPHEN 325 MG/1
650 TABLET ORAL EVERY 8 HOURS PRN
Status: DISCONTINUED | OUTPATIENT
Start: 2021-02-23 | End: 2021-03-02 | Stop reason: HOSPADM

## 2021-02-23 RX ORDER — MORPHINE SULFATE 1 MG/ML
INJECTION INTRAVENOUS CONTINUOUS
Status: CANCELLED | OUTPATIENT
Start: 2021-02-23

## 2021-02-23 RX ORDER — HYDROMORPHONE HYDROCHLORIDE 1 MG/ML
2 INJECTION, SOLUTION INTRAMUSCULAR; INTRAVENOUS; SUBCUTANEOUS
Status: DISCONTINUED | OUTPATIENT
Start: 2021-02-23 | End: 2021-02-23

## 2021-02-23 RX ORDER — GABAPENTIN 400 MG/1
800 CAPSULE ORAL 3 TIMES DAILY
Status: DISCONTINUED | OUTPATIENT
Start: 2021-02-24 | End: 2021-02-27

## 2021-02-23 RX ADMIN — DIPHENHYDRAMINE HYDROCHLORIDE 6.25 MG: 50 INJECTION, SOLUTION INTRAMUSCULAR; INTRAVENOUS at 11:02

## 2021-02-23 RX ADMIN — CARVEDILOL 25 MG: 25 TABLET, FILM COATED ORAL at 11:02

## 2021-02-23 RX ADMIN — HYDROMORPHONE HYDROCHLORIDE 2 MG: 1 INJECTION, SOLUTION INTRAMUSCULAR; INTRAVENOUS; SUBCUTANEOUS at 06:02

## 2021-02-23 RX ADMIN — ONDANSETRON 8 MG: 8 TABLET, ORALLY DISINTEGRATING ORAL at 11:02

## 2021-02-23 RX ADMIN — ONDANSETRON 4 MG: 2 INJECTION INTRAMUSCULAR; INTRAVENOUS at 05:02

## 2021-02-23 RX ADMIN — HYDROMORPHONE HYDROCHLORIDE 0.5 MG: 1 INJECTION, SOLUTION INTRAMUSCULAR; INTRAVENOUS; SUBCUTANEOUS at 11:02

## 2021-02-23 RX ADMIN — DIPHENHYDRAMINE HYDROCHLORIDE 50 MG: 50 CAPSULE ORAL at 05:02

## 2021-02-23 RX ADMIN — HYDROMORPHONE HYDROCHLORIDE 1 MG: 1 INJECTION, SOLUTION INTRAMUSCULAR; INTRAVENOUS; SUBCUTANEOUS at 05:02

## 2021-02-23 RX ADMIN — HYDROMORPHONE HYDROCHLORIDE 2 MG: 1 INJECTION, SOLUTION INTRAMUSCULAR; INTRAVENOUS; SUBCUTANEOUS at 08:02

## 2021-02-23 RX ADMIN — SODIUM CHLORIDE 1000 ML: 0.9 INJECTION, SOLUTION INTRAVENOUS at 05:02

## 2021-02-23 RX ADMIN — IPRATROPIUM BROMIDE AND ALBUTEROL SULFATE 3 ML: .5; 2.5 SOLUTION RESPIRATORY (INHALATION) at 05:02

## 2021-02-23 RX ADMIN — HYDROXYUREA 1000 MG: 500 CAPSULE ORAL at 11:02

## 2021-02-24 LAB
ACID FAST MOD KINY STN SPEC: NORMAL
ALBUMIN SERPL BCP-MCNC: 3.7 G/DL (ref 3.5–5.2)
ALP SERPL-CCNC: 73 U/L (ref 55–135)
ALT SERPL W/O P-5'-P-CCNC: 42 U/L (ref 10–44)
ANION GAP SERPL CALC-SCNC: 9 MMOL/L (ref 8–16)
ANISOCYTOSIS BLD QL SMEAR: SLIGHT
AST SERPL-CCNC: 48 U/L (ref 10–40)
BASOPHILS # BLD AUTO: 0.05 K/UL (ref 0–0.2)
BASOPHILS NFR BLD: 0.6 % (ref 0–1.9)
BILIRUB SERPL-MCNC: 0.4 MG/DL (ref 0.1–1)
BUN SERPL-MCNC: 11 MG/DL (ref 6–20)
CALCIUM SERPL-MCNC: 8.5 MG/DL (ref 8.7–10.5)
CHLORIDE SERPL-SCNC: 102 MMOL/L (ref 95–110)
CO2 SERPL-SCNC: 27 MMOL/L (ref 23–29)
CREAT SERPL-MCNC: 0.9 MG/DL (ref 0.5–1.4)
DIFFERENTIAL METHOD: ABNORMAL
EOSINOPHIL # BLD AUTO: 0.2 K/UL (ref 0–0.5)
EOSINOPHIL NFR BLD: 2.3 % (ref 0–8)
ERYTHROCYTE [DISTWIDTH] IN BLOOD BY AUTOMATED COUNT: 22.6 % (ref 11.5–14.5)
EST. GFR  (AFRICAN AMERICAN): >60 ML/MIN/1.73 M^2
EST. GFR  (NON AFRICAN AMERICAN): >60 ML/MIN/1.73 M^2
GLUCOSE SERPL-MCNC: 140 MG/DL (ref 70–110)
HCT VFR BLD AUTO: 31.4 % (ref 37–48.5)
HCV AB SERPL QL IA: NEGATIVE
HGB BLD-MCNC: 10.4 G/DL (ref 12–16)
HIV 1+2 AB+HIV1 P24 AG SERPL QL IA: NEGATIVE
HYPOCHROMIA BLD QL SMEAR: ABNORMAL
IMM GRANULOCYTES # BLD AUTO: 0.04 K/UL (ref 0–0.04)
IMM GRANULOCYTES NFR BLD AUTO: 0.5 % (ref 0–0.5)
LYMPHOCYTES # BLD AUTO: 4.6 K/UL (ref 1–4.8)
LYMPHOCYTES NFR BLD: 52.7 % (ref 18–48)
MAGNESIUM SERPL-MCNC: 2 MG/DL (ref 1.6–2.6)
MCH RBC QN AUTO: 29.1 PG (ref 27–31)
MCHC RBC AUTO-ENTMCNC: 33.1 G/DL (ref 32–36)
MCV RBC AUTO: 88 FL (ref 82–98)
MONOCYTES # BLD AUTO: 1.3 K/UL (ref 0.3–1)
MONOCYTES NFR BLD: 14.9 % (ref 4–15)
MYCOBACTERIUM SPEC QL CULT: NORMAL
NEUTROPHILS # BLD AUTO: 2.6 K/UL (ref 1.8–7.7)
NEUTROPHILS NFR BLD: 29 % (ref 38–73)
NRBC BLD-RTO: 13 /100 WBC
OVALOCYTES BLD QL SMEAR: ABNORMAL
PHOSPHATE SERPL-MCNC: 3.4 MG/DL (ref 2.7–4.5)
PLATELET # BLD AUTO: 236 K/UL (ref 150–350)
PLATELET BLD QL SMEAR: ABNORMAL
PMV BLD AUTO: 9.4 FL (ref 9.2–12.9)
POIKILOCYTOSIS BLD QL SMEAR: SLIGHT
POLYCHROMASIA BLD QL SMEAR: ABNORMAL
POTASSIUM SERPL-SCNC: 3.8 MMOL/L (ref 3.5–5.1)
PROT SERPL-MCNC: 7.3 G/DL (ref 6–8.4)
RBC # BLD AUTO: 3.58 M/UL (ref 4–5.4)
SODIUM SERPL-SCNC: 138 MMOL/L (ref 136–145)
TARGETS BLD QL SMEAR: ABNORMAL
WBC # BLD AUTO: 8.79 K/UL (ref 3.9–12.7)

## 2021-02-24 PROCEDURE — 63600175 PHARM REV CODE 636 W HCPCS: Performed by: STUDENT IN AN ORGANIZED HEALTH CARE EDUCATION/TRAINING PROGRAM

## 2021-02-24 PROCEDURE — 94761 N-INVAS EAR/PLS OXIMETRY MLT: CPT

## 2021-02-24 PROCEDURE — 20600001 HC STEP DOWN PRIVATE ROOM

## 2021-02-24 PROCEDURE — 63600175 PHARM REV CODE 636 W HCPCS: Performed by: HOSPITALIST

## 2021-02-24 PROCEDURE — 83735 ASSAY OF MAGNESIUM: CPT

## 2021-02-24 PROCEDURE — 99223 1ST HOSP IP/OBS HIGH 75: CPT | Mod: ,,, | Performed by: HOSPITALIST

## 2021-02-24 PROCEDURE — 84100 ASSAY OF PHOSPHORUS: CPT

## 2021-02-24 PROCEDURE — 36415 COLL VENOUS BLD VENIPUNCTURE: CPT

## 2021-02-24 PROCEDURE — 27000221 HC OXYGEN, UP TO 24 HOURS

## 2021-02-24 PROCEDURE — 99900035 HC TECH TIME PER 15 MIN (STAT)

## 2021-02-24 PROCEDURE — 25000003 PHARM REV CODE 250: Performed by: STUDENT IN AN ORGANIZED HEALTH CARE EDUCATION/TRAINING PROGRAM

## 2021-02-24 PROCEDURE — 99223 PR INITIAL HOSPITAL CARE,LEVL III: ICD-10-PCS | Mod: ,,, | Performed by: HOSPITALIST

## 2021-02-24 PROCEDURE — 85025 COMPLETE CBC W/AUTO DIFF WBC: CPT

## 2021-02-24 PROCEDURE — 80053 COMPREHEN METABOLIC PANEL: CPT

## 2021-02-24 PROCEDURE — 25000242 PHARM REV CODE 250 ALT 637 W/ HCPCS: Performed by: STUDENT IN AN ORGANIZED HEALTH CARE EDUCATION/TRAINING PROGRAM

## 2021-02-24 RX ORDER — NALOXONE HCL 0.4 MG/ML
0.4 VIAL (ML) INJECTION
Status: DISCONTINUED | OUTPATIENT
Start: 2021-02-24 | End: 2021-03-02 | Stop reason: HOSPADM

## 2021-02-24 RX ORDER — SENNOSIDES 8.6 MG/1
8.6 TABLET ORAL DAILY
Status: DISCONTINUED | OUTPATIENT
Start: 2021-02-24 | End: 2021-02-24

## 2021-02-24 RX ORDER — PROMETHAZINE HYDROCHLORIDE 12.5 MG/1
12.5 TABLET ORAL EVERY 6 HOURS PRN
Status: DISCONTINUED | OUTPATIENT
Start: 2021-02-24 | End: 2021-03-02 | Stop reason: HOSPADM

## 2021-02-24 RX ORDER — SENNOSIDES 8.6 MG/1
8.6 TABLET ORAL 2 TIMES DAILY
Status: DISCONTINUED | OUTPATIENT
Start: 2021-02-24 | End: 2021-03-02 | Stop reason: HOSPADM

## 2021-02-24 RX ORDER — ADHESIVE BANDAGE
30 BANDAGE TOPICAL DAILY
Status: DISCONTINUED | OUTPATIENT
Start: 2021-02-24 | End: 2021-02-25

## 2021-02-24 RX ORDER — DIPHENHYDRAMINE HCL 25 MG
25 CAPSULE ORAL EVERY 6 HOURS PRN
Status: DISCONTINUED | OUTPATIENT
Start: 2021-02-24 | End: 2021-03-02 | Stop reason: HOSPADM

## 2021-02-24 RX ADMIN — HYDROXYUREA 1000 MG: 500 CAPSULE ORAL at 08:02

## 2021-02-24 RX ADMIN — Medication: at 01:02

## 2021-02-24 RX ADMIN — NALXONE HYDROCHLORIDE 0.02 MG: 0.4 INJECTION INTRAMUSCULAR; INTRAVENOUS; SUBCUTANEOUS at 11:02

## 2021-02-24 RX ADMIN — FLUOXETINE 40 MG: 20 CAPSULE ORAL at 08:02

## 2021-02-24 RX ADMIN — FOLIC ACID 1 MG: 1 TABLET ORAL at 08:02

## 2021-02-24 RX ADMIN — CARBAMAZEPINE 800 MG: 200 TABLET ORAL at 08:02

## 2021-02-24 RX ADMIN — PROMETHAZINE HYDROCHLORIDE 6.25 MG: 25 INJECTION INTRAMUSCULAR; INTRAVENOUS at 02:02

## 2021-02-24 RX ADMIN — Medication: at 04:02

## 2021-02-24 RX ADMIN — SODIUM CHLORIDE: 0.9 INJECTION, SOLUTION INTRAVENOUS at 01:02

## 2021-02-24 RX ADMIN — NALXONE HYDROCHLORIDE 0.4 MG: 0.4 INJECTION INTRAMUSCULAR; INTRAVENOUS; SUBCUTANEOUS at 11:02

## 2021-02-24 RX ADMIN — APIXABAN 5 MG: 5 TABLET, FILM COATED ORAL at 01:02

## 2021-02-24 RX ADMIN — CARVEDILOL 25 MG: 25 TABLET, FILM COATED ORAL at 08:02

## 2021-02-24 RX ADMIN — HYDROCHLOROTHIAZIDE 25 MG: 12.5 TABLET ORAL at 09:02

## 2021-02-24 RX ADMIN — MAGNESIUM HYDROXIDE 2400 MG: 400 SUSPENSION ORAL at 10:02

## 2021-02-24 RX ADMIN — DIPHENHYDRAMINE HYDROCHLORIDE 6.25 MG: 50 INJECTION, SOLUTION INTRAMUSCULAR; INTRAVENOUS at 08:02

## 2021-02-24 RX ADMIN — CARBAMAZEPINE 800 MG: 200 TABLET ORAL at 01:02

## 2021-02-24 RX ADMIN — Medication: at 06:02

## 2021-02-24 RX ADMIN — GABAPENTIN 800 MG: 100 CAPSULE ORAL at 05:02

## 2021-02-24 RX ADMIN — GABAPENTIN 800 MG: 100 CAPSULE ORAL at 08:02

## 2021-02-24 RX ADMIN — PROMETHAZINE HYDROCHLORIDE 6.25 MG: 25 INJECTION INTRAMUSCULAR; INTRAVENOUS at 09:02

## 2021-02-24 RX ADMIN — AMLODIPINE BESYLATE 10 MG: 10 TABLET ORAL at 08:02

## 2021-02-24 RX ADMIN — ALBUTEROL SULFATE 2 PUFF: 108 INHALANT RESPIRATORY (INHALATION) at 08:02

## 2021-02-24 RX ADMIN — APIXABAN 5 MG: 5 TABLET, FILM COATED ORAL at 08:02

## 2021-02-24 RX ADMIN — SENNOSIDES 8.6 MG: 8.6 TABLET, FILM COATED ORAL at 10:02

## 2021-02-25 LAB
ALBUMIN SERPL BCP-MCNC: 3.5 G/DL (ref 3.5–5.2)
ALP SERPL-CCNC: 68 U/L (ref 55–135)
ALT SERPL W/O P-5'-P-CCNC: 33 U/L (ref 10–44)
ANION GAP SERPL CALC-SCNC: 10 MMOL/L (ref 8–16)
AST SERPL-CCNC: 49 U/L (ref 10–40)
BASOPHILS # BLD AUTO: 0.03 K/UL (ref 0–0.2)
BASOPHILS NFR BLD: 0.5 % (ref 0–1.9)
BILIRUB SERPL-MCNC: 0.4 MG/DL (ref 0.1–1)
BUN SERPL-MCNC: 8 MG/DL (ref 6–20)
CALCIUM SERPL-MCNC: 8.6 MG/DL (ref 8.7–10.5)
CHLORIDE SERPL-SCNC: 98 MMOL/L (ref 95–110)
CO2 SERPL-SCNC: 30 MMOL/L (ref 23–29)
CREAT SERPL-MCNC: 0.8 MG/DL (ref 0.5–1.4)
DIFFERENTIAL METHOD: ABNORMAL
EOSINOPHIL # BLD AUTO: 0.1 K/UL (ref 0–0.5)
EOSINOPHIL NFR BLD: 2 % (ref 0–8)
ERYTHROCYTE [DISTWIDTH] IN BLOOD BY AUTOMATED COUNT: 22.7 % (ref 11.5–14.5)
EST. GFR  (AFRICAN AMERICAN): >60 ML/MIN/1.73 M^2
EST. GFR  (NON AFRICAN AMERICAN): >60 ML/MIN/1.73 M^2
GLUCOSE SERPL-MCNC: 171 MG/DL (ref 70–110)
HCT VFR BLD AUTO: 30.4 % (ref 37–48.5)
HGB BLD-MCNC: 9.9 G/DL (ref 12–16)
IMM GRANULOCYTES # BLD AUTO: 0.02 K/UL (ref 0–0.04)
IMM GRANULOCYTES NFR BLD AUTO: 0.3 % (ref 0–0.5)
LYMPHOCYTES # BLD AUTO: 2.6 K/UL (ref 1–4.8)
LYMPHOCYTES NFR BLD: 39.9 % (ref 18–48)
MAGNESIUM SERPL-MCNC: 2.1 MG/DL (ref 1.6–2.6)
MCH RBC QN AUTO: 29 PG (ref 27–31)
MCHC RBC AUTO-ENTMCNC: 32.6 G/DL (ref 32–36)
MCV RBC AUTO: 89 FL (ref 82–98)
MONOCYTES # BLD AUTO: 0.9 K/UL (ref 0.3–1)
MONOCYTES NFR BLD: 13.1 % (ref 4–15)
NEUTROPHILS # BLD AUTO: 2.9 K/UL (ref 1.8–7.7)
NEUTROPHILS NFR BLD: 44.2 % (ref 38–73)
NRBC BLD-RTO: 19 /100 WBC
PHOSPHATE SERPL-MCNC: 1.9 MG/DL (ref 2.7–4.5)
PLATELET # BLD AUTO: 222 K/UL (ref 150–350)
PMV BLD AUTO: 9.4 FL (ref 9.2–12.9)
POTASSIUM SERPL-SCNC: 4 MMOL/L (ref 3.5–5.1)
PROT SERPL-MCNC: 7 G/DL (ref 6–8.4)
RBC # BLD AUTO: 3.41 M/UL (ref 4–5.4)
SODIUM SERPL-SCNC: 138 MMOL/L (ref 136–145)
WBC # BLD AUTO: 6.51 K/UL (ref 3.9–12.7)

## 2021-02-25 PROCEDURE — 80053 COMPREHEN METABOLIC PANEL: CPT

## 2021-02-25 PROCEDURE — 83735 ASSAY OF MAGNESIUM: CPT

## 2021-02-25 PROCEDURE — 97116 GAIT TRAINING THERAPY: CPT

## 2021-02-25 PROCEDURE — 84100 ASSAY OF PHOSPHORUS: CPT

## 2021-02-25 PROCEDURE — 25000242 PHARM REV CODE 250 ALT 637 W/ HCPCS: Performed by: STUDENT IN AN ORGANIZED HEALTH CARE EDUCATION/TRAINING PROGRAM

## 2021-02-25 PROCEDURE — 99233 SBSQ HOSP IP/OBS HIGH 50: CPT | Mod: ,,, | Performed by: STUDENT IN AN ORGANIZED HEALTH CARE EDUCATION/TRAINING PROGRAM

## 2021-02-25 PROCEDURE — 97161 PT EVAL LOW COMPLEX 20 MIN: CPT

## 2021-02-25 PROCEDURE — 25000003 PHARM REV CODE 250: Performed by: STUDENT IN AN ORGANIZED HEALTH CARE EDUCATION/TRAINING PROGRAM

## 2021-02-25 PROCEDURE — 97166 OT EVAL MOD COMPLEX 45 MIN: CPT

## 2021-02-25 PROCEDURE — 20600001 HC STEP DOWN PRIVATE ROOM

## 2021-02-25 PROCEDURE — 85025 COMPLETE CBC W/AUTO DIFF WBC: CPT

## 2021-02-25 PROCEDURE — 97530 THERAPEUTIC ACTIVITIES: CPT

## 2021-02-25 PROCEDURE — 63600175 PHARM REV CODE 636 W HCPCS: Performed by: STUDENT IN AN ORGANIZED HEALTH CARE EDUCATION/TRAINING PROGRAM

## 2021-02-25 PROCEDURE — 99233 PR SUBSEQUENT HOSPITAL CARE,LEVL III: ICD-10-PCS | Mod: ,,, | Performed by: STUDENT IN AN ORGANIZED HEALTH CARE EDUCATION/TRAINING PROGRAM

## 2021-02-25 PROCEDURE — 36415 COLL VENOUS BLD VENIPUNCTURE: CPT

## 2021-02-25 RX ORDER — ADHESIVE BANDAGE
30 BANDAGE TOPICAL 2 TIMES DAILY
Status: DISCONTINUED | OUTPATIENT
Start: 2021-02-25 | End: 2021-03-02 | Stop reason: HOSPADM

## 2021-02-25 RX ORDER — HYDROMORPHONE HYDROCHLORIDE 1 MG/ML
0.2 INJECTION, SOLUTION INTRAMUSCULAR; INTRAVENOUS; SUBCUTANEOUS ONCE
Status: COMPLETED | OUTPATIENT
Start: 2021-02-25 | End: 2021-02-25

## 2021-02-25 RX ORDER — HYDROCHLOROTHIAZIDE 12.5 MG/1
25 TABLET ORAL DAILY
Status: DISCONTINUED | OUTPATIENT
Start: 2021-02-25 | End: 2021-03-02 | Stop reason: HOSPADM

## 2021-02-25 RX ORDER — SODIUM CHLORIDE 9 MG/ML
INJECTION, SOLUTION INTRAVENOUS CONTINUOUS
Status: DISCONTINUED | OUTPATIENT
Start: 2021-02-25 | End: 2021-02-25

## 2021-02-25 RX ORDER — SODIUM CHLORIDE 450 MG/100ML
INJECTION, SOLUTION INTRAVENOUS CONTINUOUS
Status: DISCONTINUED | OUTPATIENT
Start: 2021-02-25 | End: 2021-02-26

## 2021-02-25 RX ADMIN — MAGNESIUM HYDROXIDE 2400 MG: 400 SUSPENSION ORAL at 11:02

## 2021-02-25 RX ADMIN — HYDROMORPHONE HYDROCHLORIDE 0.2 MG: 1 INJECTION, SOLUTION INTRAMUSCULAR; INTRAVENOUS; SUBCUTANEOUS at 10:02

## 2021-02-25 RX ADMIN — SENNOSIDES 8.6 MG: 8.6 TABLET, FILM COATED ORAL at 11:02

## 2021-02-25 RX ADMIN — DIPHENHYDRAMINE HYDROCHLORIDE 25 MG: 25 CAPSULE ORAL at 12:02

## 2021-02-25 RX ADMIN — HYDROCHLOROTHIAZIDE 25 MG: 12.5 TABLET ORAL at 08:02

## 2021-02-25 RX ADMIN — SODIUM CHLORIDE: 0.9 INJECTION, SOLUTION INTRAVENOUS at 08:02

## 2021-02-25 RX ADMIN — CARBAMAZEPINE 800 MG: 200 TABLET ORAL at 08:02

## 2021-02-25 RX ADMIN — SENNOSIDES 8.6 MG: 8.6 TABLET, FILM COATED ORAL at 08:02

## 2021-02-25 RX ADMIN — HYDROXYUREA 1000 MG: 500 CAPSULE ORAL at 11:02

## 2021-02-25 RX ADMIN — SODIUM CHLORIDE: 0.45 INJECTION, SOLUTION INTRAVENOUS at 03:02

## 2021-02-25 RX ADMIN — Medication: at 06:02

## 2021-02-25 RX ADMIN — HYDROXYUREA 1000 MG: 500 CAPSULE ORAL at 08:02

## 2021-02-25 RX ADMIN — FOLIC ACID 1 MG: 1 TABLET ORAL at 08:02

## 2021-02-25 RX ADMIN — AMLODIPINE BESYLATE 10 MG: 10 TABLET ORAL at 08:02

## 2021-02-25 RX ADMIN — GABAPENTIN 800 MG: 100 CAPSULE ORAL at 03:02

## 2021-02-25 RX ADMIN — ALBUTEROL SULFATE 2 PUFF: 108 INHALANT RESPIRATORY (INHALATION) at 08:02

## 2021-02-25 RX ADMIN — CARVEDILOL 25 MG: 25 TABLET, FILM COATED ORAL at 11:02

## 2021-02-25 RX ADMIN — CARBAMAZEPINE 800 MG: 200 TABLET ORAL at 11:02

## 2021-02-25 RX ADMIN — CARVEDILOL 25 MG: 25 TABLET, FILM COATED ORAL at 08:02

## 2021-02-25 RX ADMIN — MAGNESIUM HYDROXIDE 2400 MG: 400 SUSPENSION ORAL at 08:02

## 2021-02-25 RX ADMIN — APIXABAN 5 MG: 5 TABLET, FILM COATED ORAL at 11:02

## 2021-02-25 RX ADMIN — DIPHENHYDRAMINE HYDROCHLORIDE 25 MG: 25 CAPSULE ORAL at 11:02

## 2021-02-25 RX ADMIN — GABAPENTIN 800 MG: 100 CAPSULE ORAL at 11:02

## 2021-02-25 RX ADMIN — FLUTICASONE PROPIONATE 50 MCG: 50 SPRAY, METERED NASAL at 08:02

## 2021-02-25 RX ADMIN — Medication: at 12:02

## 2021-02-25 RX ADMIN — APIXABAN 5 MG: 5 TABLET, FILM COATED ORAL at 08:02

## 2021-02-25 RX ADMIN — FLUOXETINE 40 MG: 20 CAPSULE ORAL at 08:02

## 2021-02-25 RX ADMIN — GABAPENTIN 800 MG: 100 CAPSULE ORAL at 08:02

## 2021-02-26 LAB
ALBUMIN SERPL BCP-MCNC: 3.6 G/DL (ref 3.5–5.2)
ALLENS TEST: ABNORMAL
ALP SERPL-CCNC: 68 U/L (ref 55–135)
ALT SERPL W/O P-5'-P-CCNC: 29 U/L (ref 10–44)
ANION GAP SERPL CALC-SCNC: 7 MMOL/L (ref 8–16)
AST SERPL-CCNC: 40 U/L (ref 10–40)
BASOPHILS # BLD AUTO: 0.03 K/UL (ref 0–0.2)
BASOPHILS NFR BLD: 0.4 % (ref 0–1.9)
BILIRUB SERPL-MCNC: 0.4 MG/DL (ref 0.1–1)
BUN SERPL-MCNC: 7 MG/DL (ref 6–20)
CALCIUM SERPL-MCNC: 8.7 MG/DL (ref 8.7–10.5)
CHLORIDE SERPL-SCNC: 98 MMOL/L (ref 95–110)
CO2 SERPL-SCNC: 33 MMOL/L (ref 23–29)
CREAT SERPL-MCNC: 0.8 MG/DL (ref 0.5–1.4)
DELSYS: ABNORMAL
DIFFERENTIAL METHOD: ABNORMAL
EOSINOPHIL # BLD AUTO: 0.2 K/UL (ref 0–0.5)
EOSINOPHIL NFR BLD: 2.5 % (ref 0–8)
ERYTHROCYTE [DISTWIDTH] IN BLOOD BY AUTOMATED COUNT: 22.5 % (ref 11.5–14.5)
EST. GFR  (AFRICAN AMERICAN): >60 ML/MIN/1.73 M^2
EST. GFR  (NON AFRICAN AMERICAN): >60 ML/MIN/1.73 M^2
GLUCOSE SERPL-MCNC: 115 MG/DL (ref 70–110)
HCO3 UR-SCNC: 36.7 MMOL/L (ref 24–28)
HCT VFR BLD AUTO: 30.7 % (ref 37–48.5)
HGB BLD-MCNC: 10.2 G/DL (ref 12–16)
IMM GRANULOCYTES # BLD AUTO: 0.02 K/UL (ref 0–0.04)
IMM GRANULOCYTES NFR BLD AUTO: 0.3 % (ref 0–0.5)
LYMPHOCYTES # BLD AUTO: 3 K/UL (ref 1–4.8)
LYMPHOCYTES NFR BLD: 45.5 % (ref 18–48)
MAGNESIUM SERPL-MCNC: 2 MG/DL (ref 1.6–2.6)
MCH RBC QN AUTO: 29.5 PG (ref 27–31)
MCHC RBC AUTO-ENTMCNC: 33.2 G/DL (ref 32–36)
MCV RBC AUTO: 89 FL (ref 82–98)
MONOCYTES # BLD AUTO: 0.9 K/UL (ref 0.3–1)
MONOCYTES NFR BLD: 13 % (ref 4–15)
NEUTROPHILS # BLD AUTO: 2.6 K/UL (ref 1.8–7.7)
NEUTROPHILS NFR BLD: 38.3 % (ref 38–73)
NRBC BLD-RTO: 32 /100 WBC
PCO2 BLDA: 54.3 MMHG (ref 35–45)
PH SMN: 7.44 [PH] (ref 7.35–7.45)
PHOSPHATE SERPL-MCNC: 2.7 MG/DL (ref 2.7–4.5)
PLATELET # BLD AUTO: 242 K/UL (ref 150–350)
PMV BLD AUTO: 10 FL (ref 9.2–12.9)
PO2 BLDA: 62 MMHG (ref 80–100)
POC BE: 13 MMOL/L
POC SATURATED O2: 92 % (ref 95–100)
POC TCO2: 38 MMOL/L (ref 23–27)
POCT GLUCOSE: 121 MG/DL (ref 70–110)
POTASSIUM SERPL-SCNC: 3.7 MMOL/L (ref 3.5–5.1)
PROT SERPL-MCNC: 7.1 G/DL (ref 6–8.4)
RBC # BLD AUTO: 3.46 M/UL (ref 4–5.4)
SAMPLE: ABNORMAL
SITE: ABNORMAL
SODIUM SERPL-SCNC: 138 MMOL/L (ref 136–145)
WBC # BLD AUTO: 6.68 K/UL (ref 3.9–12.7)

## 2021-02-26 PROCEDURE — 83735 ASSAY OF MAGNESIUM: CPT

## 2021-02-26 PROCEDURE — 63600175 PHARM REV CODE 636 W HCPCS: Performed by: STUDENT IN AN ORGANIZED HEALTH CARE EDUCATION/TRAINING PROGRAM

## 2021-02-26 PROCEDURE — 85347 COAGULATION TIME ACTIVATED: CPT

## 2021-02-26 PROCEDURE — 36600 WITHDRAWAL OF ARTERIAL BLOOD: CPT

## 2021-02-26 PROCEDURE — 94660 CPAP INITIATION&MGMT: CPT

## 2021-02-26 PROCEDURE — 99900035 HC TECH TIME PER 15 MIN (STAT)

## 2021-02-26 PROCEDURE — 36415 COLL VENOUS BLD VENIPUNCTURE: CPT

## 2021-02-26 PROCEDURE — 84100 ASSAY OF PHOSPHORUS: CPT

## 2021-02-26 PROCEDURE — 27000190 HC CPAP FULL FACE MASK W/VALVE

## 2021-02-26 PROCEDURE — 25000242 PHARM REV CODE 250 ALT 637 W/ HCPCS: Performed by: STUDENT IN AN ORGANIZED HEALTH CARE EDUCATION/TRAINING PROGRAM

## 2021-02-26 PROCEDURE — 85025 COMPLETE CBC W/AUTO DIFF WBC: CPT

## 2021-02-26 PROCEDURE — U0002 COVID-19 LAB TEST NON-CDC: HCPCS

## 2021-02-26 PROCEDURE — 25000003 PHARM REV CODE 250: Performed by: STUDENT IN AN ORGANIZED HEALTH CARE EDUCATION/TRAINING PROGRAM

## 2021-02-26 PROCEDURE — 80053 COMPREHEN METABOLIC PANEL: CPT

## 2021-02-26 PROCEDURE — 99233 PR SUBSEQUENT HOSPITAL CARE,LEVL III: ICD-10-PCS | Mod: ,,, | Performed by: STUDENT IN AN ORGANIZED HEALTH CARE EDUCATION/TRAINING PROGRAM

## 2021-02-26 PROCEDURE — 20600001 HC STEP DOWN PRIVATE ROOM

## 2021-02-26 PROCEDURE — 99233 SBSQ HOSP IP/OBS HIGH 50: CPT | Mod: ,,, | Performed by: STUDENT IN AN ORGANIZED HEALTH CARE EDUCATION/TRAINING PROGRAM

## 2021-02-26 RX ORDER — IPRATROPIUM BROMIDE AND ALBUTEROL SULFATE 2.5; .5 MG/3ML; MG/3ML
3 SOLUTION RESPIRATORY (INHALATION)
Status: ACTIVE | OUTPATIENT
Start: 2021-02-27 | End: 2021-02-27

## 2021-02-26 RX ORDER — NALOXONE HCL 0.4 MG/ML
0.4 VIAL (ML) INJECTION ONCE
Status: CANCELLED | OUTPATIENT
Start: 2021-02-26

## 2021-02-26 RX ORDER — FUROSEMIDE 10 MG/ML
40 INJECTION INTRAMUSCULAR; INTRAVENOUS ONCE
Status: COMPLETED | OUTPATIENT
Start: 2021-02-27 | End: 2021-02-26

## 2021-02-26 RX ORDER — HYDROMORPHONE HCL IN 0.9% NACL 6 MG/30 ML
PATIENT CONTROLLED ANALGESIA SYRINGE INTRAVENOUS CONTINUOUS
Status: CANCELLED | OUTPATIENT
Start: 2021-02-26

## 2021-02-26 RX ADMIN — SENNOSIDES 8.6 MG: 8.6 TABLET, FILM COATED ORAL at 09:02

## 2021-02-26 RX ADMIN — FLUOXETINE 40 MG: 20 CAPSULE ORAL at 09:02

## 2021-02-26 RX ADMIN — FLUTICASONE PROPIONATE 50 MCG: 50 SPRAY, METERED NASAL at 09:02

## 2021-02-26 RX ADMIN — HYDROCHLOROTHIAZIDE 25 MG: 12.5 TABLET ORAL at 09:02

## 2021-02-26 RX ADMIN — CARVEDILOL 25 MG: 25 TABLET, FILM COATED ORAL at 09:02

## 2021-02-26 RX ADMIN — HYDROXYUREA 1000 MG: 500 CAPSULE ORAL at 09:02

## 2021-02-26 RX ADMIN — GABAPENTIN 800 MG: 100 CAPSULE ORAL at 09:02

## 2021-02-26 RX ADMIN — MAGNESIUM HYDROXIDE 2400 MG: 400 SUSPENSION ORAL at 09:02

## 2021-02-26 RX ADMIN — APIXABAN 5 MG: 5 TABLET, FILM COATED ORAL at 09:02

## 2021-02-26 RX ADMIN — FUROSEMIDE 40 MG: 10 INJECTION, SOLUTION INTRAMUSCULAR; INTRAVENOUS at 11:02

## 2021-02-26 RX ADMIN — FOLIC ACID 1 MG: 1 TABLET ORAL at 09:02

## 2021-02-26 RX ADMIN — AMLODIPINE BESYLATE 10 MG: 10 TABLET ORAL at 09:02

## 2021-02-26 RX ADMIN — CARBAMAZEPINE 800 MG: 200 TABLET ORAL at 09:02

## 2021-02-26 RX ADMIN — Medication: at 02:02

## 2021-02-27 PROBLEM — J81.1 PULMONARY EDEMA: Status: ACTIVE | Noted: 2021-02-27

## 2021-02-27 LAB
ALBUMIN SERPL BCP-MCNC: 3.3 G/DL (ref 3.5–5.2)
ALP SERPL-CCNC: 65 U/L (ref 55–135)
ALT SERPL W/O P-5'-P-CCNC: 22 U/L (ref 10–44)
ANION GAP SERPL CALC-SCNC: 11 MMOL/L (ref 8–16)
ASCENDING AORTA: 2.97 CM
AST SERPL-CCNC: 28 U/L (ref 10–40)
AV INDEX (PROSTH): 0.4
AV MEAN GRADIENT: 6 MMHG
AV PEAK GRADIENT: 11 MMHG
AV VALVE AREA: 1.52 CM2
AV VELOCITY RATIO: 0.35
BASOPHILS # BLD AUTO: 0.01 K/UL (ref 0–0.2)
BASOPHILS NFR BLD: 0.2 % (ref 0–1.9)
BILIRUB SERPL-MCNC: 0.4 MG/DL (ref 0.1–1)
BNP SERPL-MCNC: 22 PG/ML (ref 0–99)
BSA FOR ECHO PROCEDURE: 2.21 M2
BUN SERPL-MCNC: 6 MG/DL (ref 6–20)
CALCIUM SERPL-MCNC: 8.5 MG/DL (ref 8.7–10.5)
CHLORIDE SERPL-SCNC: 98 MMOL/L (ref 95–110)
CO2 SERPL-SCNC: 32 MMOL/L (ref 23–29)
CREAT SERPL-MCNC: 0.8 MG/DL (ref 0.5–1.4)
CV ECHO LV RWT: 0.39 CM
DIFFERENTIAL METHOD: ABNORMAL
DOP CALC AO PEAK VEL: 1.65 M/S
DOP CALC AO VTI: 29.24 CM
DOP CALC LVOT AREA: 3.8 CM2
DOP CALC LVOT DIAMETER: 2.21 CM
DOP CALC LVOT PEAK VEL: 0.58 M/S
DOP CALC LVOT STROKE VOLUME: 44.32 CM3
DOP CALCLVOT PEAK VEL VTI: 11.56 CM
E WAVE DECELERATION TIME: 155.51 MSEC
E/A RATIO: 1.16
E/E' RATIO: 7.62 M/S
ECHO LV POSTERIOR WALL: 1.03 CM (ref 0.6–1.1)
EOSINOPHIL # BLD AUTO: 0.1 K/UL (ref 0–0.5)
EOSINOPHIL NFR BLD: 1.7 % (ref 0–8)
ERYTHROCYTE [DISTWIDTH] IN BLOOD BY AUTOMATED COUNT: 22.2 % (ref 11.5–14.5)
EST. GFR  (AFRICAN AMERICAN): >60 ML/MIN/1.73 M^2
EST. GFR  (NON AFRICAN AMERICAN): >60 ML/MIN/1.73 M^2
FRACTIONAL SHORTENING: 33 % (ref 28–44)
GLUCOSE SERPL-MCNC: 148 MG/DL (ref 70–110)
HCT VFR BLD AUTO: 30 % (ref 37–48.5)
HGB BLD-MCNC: 10 G/DL (ref 12–16)
IMM GRANULOCYTES # BLD AUTO: 0.01 K/UL (ref 0–0.04)
IMM GRANULOCYTES NFR BLD AUTO: 0.2 % (ref 0–0.5)
INTERVENTRICULAR SEPTUM: 0.82 CM (ref 0.6–1.1)
IVRT: 99.9 MSEC
LA MAJOR: 5.7 CM
LA MINOR: 5.71 CM
LA WIDTH: 3.98 CM
LACTATE SERPL-SCNC: 0.9 MMOL/L (ref 0.5–2.2)
LEFT ATRIUM SIZE: 3.6 CM
LEFT ATRIUM VOLUME INDEX MOD: 21.4 ML/M2
LEFT ATRIUM VOLUME INDEX: 33.2 ML/M2
LEFT ATRIUM VOLUME MOD: 44.83 CM3
LEFT ATRIUM VOLUME: 69.48 CM3
LEFT INTERNAL DIMENSION IN SYSTOLE: 3.54 CM (ref 2.1–4)
LEFT VENTRICLE DIASTOLIC VOLUME INDEX: 63.87 ML/M2
LEFT VENTRICLE DIASTOLIC VOLUME: 133.48 ML
LEFT VENTRICLE MASS INDEX: 86 G/M2
LEFT VENTRICLE SYSTOLIC VOLUME INDEX: 25.1 ML/M2
LEFT VENTRICLE SYSTOLIC VOLUME: 52.39 ML
LEFT VENTRICULAR INTERNAL DIMENSION IN DIASTOLE: 5.27 CM (ref 3.5–6)
LEFT VENTRICULAR MASS: 179.13 G
LV LATERAL E/E' RATIO: 8 M/S
LV SEPTAL E/E' RATIO: 7.27 M/S
LYMPHOCYTES # BLD AUTO: 2.4 K/UL (ref 1–4.8)
LYMPHOCYTES NFR BLD: 44.3 % (ref 18–48)
MAGNESIUM SERPL-MCNC: 1.8 MG/DL (ref 1.6–2.6)
MCH RBC QN AUTO: 29.4 PG (ref 27–31)
MCHC RBC AUTO-ENTMCNC: 33.3 G/DL (ref 32–36)
MCV RBC AUTO: 88 FL (ref 82–98)
MONOCYTES # BLD AUTO: 0.5 K/UL (ref 0.3–1)
MONOCYTES NFR BLD: 9.6 % (ref 4–15)
MV A" WAVE DURATION": 9.42 MSEC
MV PEAK A VEL: 0.69 M/S
MV PEAK E VEL: 0.8 M/S
MV STENOSIS PRESSURE HALF TIME: 45.1 MS
MV VALVE AREA P 1/2 METHOD: 4.88 CM2
NEUTROPHILS # BLD AUTO: 2.4 K/UL (ref 1.8–7.7)
NEUTROPHILS NFR BLD: 44 % (ref 38–73)
NRBC BLD-RTO: 24 /100 WBC
PHOSPHATE SERPL-MCNC: 2.4 MG/DL (ref 2.7–4.5)
PISA TR MAX VEL: 1.66 M/S
PLATELET # BLD AUTO: 235 K/UL (ref 150–350)
PMV BLD AUTO: 10.1 FL (ref 9.2–12.9)
POTASSIUM SERPL-SCNC: 3.4 MMOL/L (ref 3.5–5.1)
PROT SERPL-MCNC: 6.5 G/DL (ref 6–8.4)
PULM VEIN S/D RATIO: 1.56
PV PEAK D VEL: 0.34 M/S
PV PEAK S VEL: 0.53 M/S
RA MAJOR: 4.67 CM
RA PRESSURE: 3 MMHG
RA WIDTH: 3.5 CM
RBC # BLD AUTO: 3.4 M/UL (ref 4–5.4)
RIGHT VENTRICULAR END-DIASTOLIC DIMENSION: 3.22 CM
RV TISSUE DOPPLER FREE WALL SYSTOLIC VELOCITY 1 (APICAL 4 CHAMBER VIEW): 15.82 CM/S
SARS-COV-2 RDRP RESP QL NAA+PROBE: NEGATIVE
SINUS: 3.04 CM
SODIUM SERPL-SCNC: 141 MMOL/L (ref 136–145)
STJ: 2.58 CM
TDI LATERAL: 0.1 M/S
TDI SEPTAL: 0.11 M/S
TDI: 0.11 M/S
TR MAX PG: 11 MMHG
TRICUSPID ANNULAR PLANE SYSTOLIC EXCURSION: 2.74 CM
TROPONIN I SERPL DL<=0.01 NG/ML-MCNC: <0.006 NG/ML (ref 0–0.03)
TV REST PULMONARY ARTERY PRESSURE: 14 MMHG
WBC # BLD AUTO: 5.39 K/UL (ref 3.9–12.7)

## 2021-02-27 PROCEDURE — 63600175 PHARM REV CODE 636 W HCPCS: Performed by: STUDENT IN AN ORGANIZED HEALTH CARE EDUCATION/TRAINING PROGRAM

## 2021-02-27 PROCEDURE — 27000190 HC CPAP FULL FACE MASK W/VALVE

## 2021-02-27 PROCEDURE — 99233 PR SUBSEQUENT HOSPITAL CARE,LEVL III: ICD-10-PCS | Mod: ,,, | Performed by: INTERNAL MEDICINE

## 2021-02-27 PROCEDURE — 84100 ASSAY OF PHOSPHORUS: CPT

## 2021-02-27 PROCEDURE — 25000003 PHARM REV CODE 250: Performed by: STUDENT IN AN ORGANIZED HEALTH CARE EDUCATION/TRAINING PROGRAM

## 2021-02-27 PROCEDURE — 99900035 HC TECH TIME PER 15 MIN (STAT)

## 2021-02-27 PROCEDURE — 83880 ASSAY OF NATRIURETIC PEPTIDE: CPT

## 2021-02-27 PROCEDURE — 36415 COLL VENOUS BLD VENIPUNCTURE: CPT

## 2021-02-27 PROCEDURE — 27000221 HC OXYGEN, UP TO 24 HOURS

## 2021-02-27 PROCEDURE — 20600001 HC STEP DOWN PRIVATE ROOM

## 2021-02-27 PROCEDURE — 99233 SBSQ HOSP IP/OBS HIGH 50: CPT | Mod: ,,, | Performed by: INTERNAL MEDICINE

## 2021-02-27 PROCEDURE — 83735 ASSAY OF MAGNESIUM: CPT

## 2021-02-27 PROCEDURE — 80053 COMPREHEN METABOLIC PANEL: CPT

## 2021-02-27 PROCEDURE — 85025 COMPLETE CBC W/AUTO DIFF WBC: CPT

## 2021-02-27 PROCEDURE — 94660 CPAP INITIATION&MGMT: CPT

## 2021-02-27 PROCEDURE — 84484 ASSAY OF TROPONIN QUANT: CPT

## 2021-02-27 PROCEDURE — 83605 ASSAY OF LACTIC ACID: CPT

## 2021-02-27 RX ORDER — HYDROMORPHONE HCL IN 0.9% NACL 6 MG/30 ML
PATIENT CONTROLLED ANALGESIA SYRINGE INTRAVENOUS CONTINUOUS
Status: DISCONTINUED | OUTPATIENT
Start: 2021-02-27 | End: 2021-02-28

## 2021-02-27 RX ORDER — MUPIROCIN 20 MG/G
OINTMENT TOPICAL DAILY
Status: DISCONTINUED | OUTPATIENT
Start: 2021-02-27 | End: 2021-03-02 | Stop reason: HOSPADM

## 2021-02-27 RX ORDER — CARBAMAZEPINE 200 MG/1
800 TABLET ORAL NIGHTLY
Status: DISCONTINUED | OUTPATIENT
Start: 2021-02-27 | End: 2021-03-02 | Stop reason: HOSPADM

## 2021-02-27 RX ORDER — POTASSIUM CHLORIDE 20 MEQ/1
40 TABLET, EXTENDED RELEASE ORAL
Status: COMPLETED | OUTPATIENT
Start: 2021-02-27 | End: 2021-02-27

## 2021-02-27 RX ORDER — GABAPENTIN 400 MG/1
800 CAPSULE ORAL NIGHTLY
Status: DISCONTINUED | OUTPATIENT
Start: 2021-02-27 | End: 2021-03-02 | Stop reason: HOSPADM

## 2021-02-27 RX ORDER — OXYCODONE AND ACETAMINOPHEN 5; 325 MG/1; MG/1
1 TABLET ORAL ONCE
Status: COMPLETED | OUTPATIENT
Start: 2021-02-27 | End: 2021-02-27

## 2021-02-27 RX ORDER — HYDROMORPHONE HYDROCHLORIDE 1 MG/ML
0.4 INJECTION, SOLUTION INTRAMUSCULAR; INTRAVENOUS; SUBCUTANEOUS ONCE
Status: COMPLETED | OUTPATIENT
Start: 2021-02-27 | End: 2021-02-27

## 2021-02-27 RX ADMIN — AMLODIPINE BESYLATE 10 MG: 10 TABLET ORAL at 08:02

## 2021-02-27 RX ADMIN — Medication: at 10:02

## 2021-02-27 RX ADMIN — DIPHENHYDRAMINE HYDROCHLORIDE 25 MG: 25 CAPSULE ORAL at 10:02

## 2021-02-27 RX ADMIN — HYDROCHLOROTHIAZIDE 25 MG: 12.5 TABLET ORAL at 08:02

## 2021-02-27 RX ADMIN — CARBAMAZEPINE 800 MG: 200 TABLET ORAL at 09:02

## 2021-02-27 RX ADMIN — OXYCODONE HYDROCHLORIDE AND ACETAMINOPHEN 1 TABLET: 5; 325 TABLET ORAL at 12:02

## 2021-02-27 RX ADMIN — FLUOXETINE 40 MG: 20 CAPSULE ORAL at 08:02

## 2021-02-27 RX ADMIN — MAGNESIUM HYDROXIDE 2400 MG: 400 SUSPENSION ORAL at 08:02

## 2021-02-27 RX ADMIN — POTASSIUM CHLORIDE 40 MEQ: 1500 TABLET, EXTENDED RELEASE ORAL at 12:02

## 2021-02-27 RX ADMIN — APIXABAN 5 MG: 5 TABLET, FILM COATED ORAL at 08:02

## 2021-02-27 RX ADMIN — Medication: at 01:02

## 2021-02-27 RX ADMIN — PIPERACILLIN AND TAZOBACTAM 4.5 G: 4; .5 INJECTION, POWDER, LYOPHILIZED, FOR SOLUTION INTRAVENOUS; PARENTERAL at 08:02

## 2021-02-27 RX ADMIN — MAGNESIUM HYDROXIDE 2400 MG: 400 SUSPENSION ORAL at 09:02

## 2021-02-27 RX ADMIN — HYDROXYUREA 1000 MG: 500 CAPSULE ORAL at 09:02

## 2021-02-27 RX ADMIN — SENNOSIDES 8.6 MG: 8.6 TABLET, FILM COATED ORAL at 08:02

## 2021-02-27 RX ADMIN — CYCLOBENZAPRINE HYDROCHLORIDE 10 MG: 5 TABLET, FILM COATED ORAL at 09:02

## 2021-02-27 RX ADMIN — PIPERACILLIN AND TAZOBACTAM 4.5 G: 4; .5 INJECTION, POWDER, LYOPHILIZED, FOR SOLUTION INTRAVENOUS; PARENTERAL at 12:02

## 2021-02-27 RX ADMIN — Medication: at 06:02

## 2021-02-27 RX ADMIN — FOLIC ACID 1 MG: 1 TABLET ORAL at 08:02

## 2021-02-27 RX ADMIN — CARVEDILOL 25 MG: 25 TABLET, FILM COATED ORAL at 09:02

## 2021-02-27 RX ADMIN — CARBAMAZEPINE 800 MG: 200 TABLET ORAL at 08:02

## 2021-02-27 RX ADMIN — SENNOSIDES 8.6 MG: 8.6 TABLET, FILM COATED ORAL at 09:02

## 2021-02-27 RX ADMIN — HYDROMORPHONE HYDROCHLORIDE 0.4 MG: 1 INJECTION, SOLUTION INTRAMUSCULAR; INTRAVENOUS; SUBCUTANEOUS at 10:02

## 2021-02-27 RX ADMIN — POTASSIUM CHLORIDE 40 MEQ: 1500 TABLET, EXTENDED RELEASE ORAL at 01:02

## 2021-02-27 RX ADMIN — CARVEDILOL 25 MG: 25 TABLET, FILM COATED ORAL at 08:02

## 2021-02-27 RX ADMIN — FLUTICASONE PROPIONATE 50 MCG: 50 SPRAY, METERED NASAL at 08:02

## 2021-02-27 RX ADMIN — MUPIROCIN: 20 OINTMENT TOPICAL at 12:02

## 2021-02-27 RX ADMIN — HYDROXYUREA 1000 MG: 500 CAPSULE ORAL at 08:02

## 2021-02-27 RX ADMIN — VANCOMYCIN HYDROCHLORIDE 2500 MG: 1.25 INJECTION, POWDER, LYOPHILIZED, FOR SOLUTION INTRAVENOUS at 03:02

## 2021-02-27 RX ADMIN — APIXABAN 5 MG: 5 TABLET, FILM COATED ORAL at 09:02

## 2021-02-27 RX ADMIN — GABAPENTIN 800 MG: 400 CAPSULE ORAL at 09:02

## 2021-02-28 LAB
ALBUMIN SERPL BCP-MCNC: 3.7 G/DL (ref 3.5–5.2)
ALP SERPL-CCNC: 71 U/L (ref 55–135)
ALT SERPL W/O P-5'-P-CCNC: 21 U/L (ref 10–44)
ANION GAP SERPL CALC-SCNC: 8 MMOL/L (ref 8–16)
AST SERPL-CCNC: 21 U/L (ref 10–40)
BASOPHILS # BLD AUTO: 0.03 K/UL (ref 0–0.2)
BASOPHILS NFR BLD: 0.6 % (ref 0–1.9)
BILIRUB SERPL-MCNC: 0.4 MG/DL (ref 0.1–1)
BUN SERPL-MCNC: 10 MG/DL (ref 6–20)
CALCIUM SERPL-MCNC: 9 MG/DL (ref 8.7–10.5)
CHLORIDE SERPL-SCNC: 99 MMOL/L (ref 95–110)
CO2 SERPL-SCNC: 32 MMOL/L (ref 23–29)
CREAT SERPL-MCNC: 1.1 MG/DL (ref 0.5–1.4)
DIFFERENTIAL METHOD: ABNORMAL
EOSINOPHIL # BLD AUTO: 0.1 K/UL (ref 0–0.5)
EOSINOPHIL NFR BLD: 2.3 % (ref 0–8)
ERYTHROCYTE [DISTWIDTH] IN BLOOD BY AUTOMATED COUNT: 22.5 % (ref 11.5–14.5)
EST. GFR  (AFRICAN AMERICAN): >60 ML/MIN/1.73 M^2
EST. GFR  (NON AFRICAN AMERICAN): >60 ML/MIN/1.73 M^2
GLUCOSE SERPL-MCNC: 134 MG/DL (ref 70–110)
HCT VFR BLD AUTO: 34.2 % (ref 37–48.5)
HGB BLD-MCNC: 11.1 G/DL (ref 12–16)
IMM GRANULOCYTES # BLD AUTO: 0.01 K/UL (ref 0–0.04)
IMM GRANULOCYTES NFR BLD AUTO: 0.2 % (ref 0–0.5)
LYMPHOCYTES # BLD AUTO: 2.9 K/UL (ref 1–4.8)
LYMPHOCYTES NFR BLD: 53.6 % (ref 18–48)
MAGNESIUM SERPL-MCNC: 2.3 MG/DL (ref 1.6–2.6)
MCH RBC QN AUTO: 29 PG (ref 27–31)
MCHC RBC AUTO-ENTMCNC: 32.5 G/DL (ref 32–36)
MCV RBC AUTO: 89 FL (ref 82–98)
MONOCYTES # BLD AUTO: 0.6 K/UL (ref 0.3–1)
MONOCYTES NFR BLD: 10.3 % (ref 4–15)
NEUTROPHILS # BLD AUTO: 1.8 K/UL (ref 1.8–7.7)
NEUTROPHILS NFR BLD: 33 % (ref 38–73)
NRBC BLD-RTO: 28 /100 WBC
PHOSPHATE SERPL-MCNC: 4.6 MG/DL (ref 2.7–4.5)
PLATELET # BLD AUTO: 232 K/UL (ref 150–350)
PMV BLD AUTO: 9.2 FL (ref 9.2–12.9)
POTASSIUM SERPL-SCNC: 3.9 MMOL/L (ref 3.5–5.1)
PROT SERPL-MCNC: 7.5 G/DL (ref 6–8.4)
RBC # BLD AUTO: 3.83 M/UL (ref 4–5.4)
SODIUM SERPL-SCNC: 139 MMOL/L (ref 136–145)
WBC # BLD AUTO: 5.32 K/UL (ref 3.9–12.7)

## 2021-02-28 PROCEDURE — 83735 ASSAY OF MAGNESIUM: CPT

## 2021-02-28 PROCEDURE — 84100 ASSAY OF PHOSPHORUS: CPT

## 2021-02-28 PROCEDURE — 25000003 PHARM REV CODE 250: Performed by: STUDENT IN AN ORGANIZED HEALTH CARE EDUCATION/TRAINING PROGRAM

## 2021-02-28 PROCEDURE — 20600001 HC STEP DOWN PRIVATE ROOM

## 2021-02-28 PROCEDURE — 80053 COMPREHEN METABOLIC PANEL: CPT

## 2021-02-28 PROCEDURE — A9585 GADOBUTROL INJECTION: HCPCS | Performed by: INTERNAL MEDICINE

## 2021-02-28 PROCEDURE — 25500020 PHARM REV CODE 255: Performed by: INTERNAL MEDICINE

## 2021-02-28 PROCEDURE — 85025 COMPLETE CBC W/AUTO DIFF WBC: CPT

## 2021-02-28 PROCEDURE — 63600175 PHARM REV CODE 636 W HCPCS: Performed by: STUDENT IN AN ORGANIZED HEALTH CARE EDUCATION/TRAINING PROGRAM

## 2021-02-28 PROCEDURE — 36415 COLL VENOUS BLD VENIPUNCTURE: CPT

## 2021-02-28 RX ORDER — GADOBUTROL 604.72 MG/ML
10 INJECTION INTRAVENOUS
Status: COMPLETED | OUTPATIENT
Start: 2021-02-28 | End: 2021-02-28

## 2021-02-28 RX ORDER — HYDROMORPHONE HYDROCHLORIDE 1 MG/ML
1 INJECTION, SOLUTION INTRAMUSCULAR; INTRAVENOUS; SUBCUTANEOUS EVERY 4 HOURS PRN
Status: DISCONTINUED | OUTPATIENT
Start: 2021-02-28 | End: 2021-03-01

## 2021-02-28 RX ORDER — LACTULOSE 10 G/15ML
15 SOLUTION ORAL DAILY
Status: DISCONTINUED | OUTPATIENT
Start: 2021-02-28 | End: 2021-03-01

## 2021-02-28 RX ADMIN — APIXABAN 5 MG: 5 TABLET, FILM COATED ORAL at 08:02

## 2021-02-28 RX ADMIN — SENNOSIDES 8.6 MG: 8.6 TABLET, FILM COATED ORAL at 08:02

## 2021-02-28 RX ADMIN — CARBAMAZEPINE 800 MG: 200 TABLET ORAL at 08:02

## 2021-02-28 RX ADMIN — GADOBUTROL 10 ML: 604.72 INJECTION INTRAVENOUS at 03:02

## 2021-02-28 RX ADMIN — HYDROMORPHONE HYDROCHLORIDE 1 MG: 1 INJECTION, SOLUTION INTRAMUSCULAR; INTRAVENOUS; SUBCUTANEOUS at 08:02

## 2021-02-28 RX ADMIN — FLUOXETINE 40 MG: 20 CAPSULE ORAL at 08:02

## 2021-02-28 RX ADMIN — FLUTICASONE PROPIONATE 50 MCG: 50 SPRAY, METERED NASAL at 08:02

## 2021-02-28 RX ADMIN — CYCLOBENZAPRINE HYDROCHLORIDE 10 MG: 5 TABLET, FILM COATED ORAL at 05:02

## 2021-02-28 RX ADMIN — CARVEDILOL 25 MG: 25 TABLET, FILM COATED ORAL at 08:02

## 2021-02-28 RX ADMIN — Medication: at 07:02

## 2021-02-28 RX ADMIN — MAGNESIUM HYDROXIDE 2400 MG: 400 SUSPENSION ORAL at 08:02

## 2021-02-28 RX ADMIN — HYDROXYUREA 1000 MG: 500 CAPSULE ORAL at 08:02

## 2021-02-28 RX ADMIN — DIPHENHYDRAMINE HYDROCHLORIDE 25 MG: 25 CAPSULE ORAL at 08:02

## 2021-02-28 RX ADMIN — HYDROMORPHONE HYDROCHLORIDE 1 MG: 1 INJECTION, SOLUTION INTRAMUSCULAR; INTRAVENOUS; SUBCUTANEOUS at 04:02

## 2021-02-28 RX ADMIN — MUPIROCIN: 20 OINTMENT TOPICAL at 08:02

## 2021-02-28 RX ADMIN — LACTULOSE 15 G: 20 SOLUTION ORAL at 10:02

## 2021-02-28 RX ADMIN — HYDROCHLOROTHIAZIDE 25 MG: 12.5 TABLET ORAL at 08:02

## 2021-02-28 RX ADMIN — AMLODIPINE BESYLATE 10 MG: 10 TABLET ORAL at 08:02

## 2021-02-28 RX ADMIN — FOLIC ACID 1 MG: 1 TABLET ORAL at 08:02

## 2021-02-28 RX ADMIN — GABAPENTIN 800 MG: 400 CAPSULE ORAL at 08:02

## 2021-03-01 ENCOUNTER — TELEPHONE (OUTPATIENT)
Dept: HEMATOLOGY/ONCOLOGY | Facility: CLINIC | Age: 41
End: 2021-03-01

## 2021-03-01 LAB
ALBUMIN SERPL BCP-MCNC: 3.4 G/DL (ref 3.5–5.2)
ALP SERPL-CCNC: 66 U/L (ref 55–135)
ALT SERPL W/O P-5'-P-CCNC: 18 U/L (ref 10–44)
ANION GAP SERPL CALC-SCNC: 7 MMOL/L (ref 8–16)
AST SERPL-CCNC: 19 U/L (ref 10–40)
BASOPHILS # BLD AUTO: 0.01 K/UL (ref 0–0.2)
BASOPHILS NFR BLD: 0.2 % (ref 0–1.9)
BILIRUB SERPL-MCNC: 0.4 MG/DL (ref 0.1–1)
BUN SERPL-MCNC: 9 MG/DL (ref 6–20)
CALCIUM SERPL-MCNC: 8.8 MG/DL (ref 8.7–10.5)
CHLORIDE SERPL-SCNC: 98 MMOL/L (ref 95–110)
CO2 SERPL-SCNC: 34 MMOL/L (ref 23–29)
CREAT SERPL-MCNC: 0.9 MG/DL (ref 0.5–1.4)
DIFFERENTIAL METHOD: ABNORMAL
EOSINOPHIL # BLD AUTO: 0.1 K/UL (ref 0–0.5)
EOSINOPHIL NFR BLD: 2 % (ref 0–8)
ERYTHROCYTE [DISTWIDTH] IN BLOOD BY AUTOMATED COUNT: 22 % (ref 11.5–14.5)
EST. GFR  (AFRICAN AMERICAN): >60 ML/MIN/1.73 M^2
EST. GFR  (NON AFRICAN AMERICAN): >60 ML/MIN/1.73 M^2
GLUCOSE SERPL-MCNC: 112 MG/DL (ref 70–110)
HCT VFR BLD AUTO: 28.9 % (ref 37–48.5)
HGB BLD-MCNC: 9.8 G/DL (ref 12–16)
IMM GRANULOCYTES # BLD AUTO: 0.01 K/UL (ref 0–0.04)
IMM GRANULOCYTES NFR BLD AUTO: 0.2 % (ref 0–0.5)
LYMPHOCYTES # BLD AUTO: 2.2 K/UL (ref 1–4.8)
LYMPHOCYTES NFR BLD: 39 % (ref 18–48)
MAGNESIUM SERPL-MCNC: 2.3 MG/DL (ref 1.6–2.6)
MCH RBC QN AUTO: 30.3 PG (ref 27–31)
MCHC RBC AUTO-ENTMCNC: 33.9 G/DL (ref 32–36)
MCV RBC AUTO: 90 FL (ref 82–98)
MONOCYTES # BLD AUTO: 0.7 K/UL (ref 0.3–1)
MONOCYTES NFR BLD: 12.3 % (ref 4–15)
NEUTROPHILS # BLD AUTO: 2.6 K/UL (ref 1.8–7.7)
NEUTROPHILS NFR BLD: 46.3 % (ref 38–73)
NRBC BLD-RTO: 10 /100 WBC
PHOSPHATE SERPL-MCNC: 3.4 MG/DL (ref 2.7–4.5)
PLATELET # BLD AUTO: 210 K/UL (ref 150–350)
PMV BLD AUTO: 9.5 FL (ref 9.2–12.9)
POTASSIUM SERPL-SCNC: 3.9 MMOL/L (ref 3.5–5.1)
PROT SERPL-MCNC: 7 G/DL (ref 6–8.4)
RBC # BLD AUTO: 3.23 M/UL (ref 4–5.4)
SODIUM SERPL-SCNC: 139 MMOL/L (ref 136–145)
WBC # BLD AUTO: 5.51 K/UL (ref 3.9–12.7)

## 2021-03-01 PROCEDURE — 63600175 PHARM REV CODE 636 W HCPCS: Performed by: STUDENT IN AN ORGANIZED HEALTH CARE EDUCATION/TRAINING PROGRAM

## 2021-03-01 PROCEDURE — 25000003 PHARM REV CODE 250: Performed by: STUDENT IN AN ORGANIZED HEALTH CARE EDUCATION/TRAINING PROGRAM

## 2021-03-01 PROCEDURE — 80053 COMPREHEN METABOLIC PANEL: CPT

## 2021-03-01 PROCEDURE — 83735 ASSAY OF MAGNESIUM: CPT

## 2021-03-01 PROCEDURE — 99239 HOSP IP/OBS DSCHRG MGMT >30: CPT | Mod: ,,, | Performed by: INTERNAL MEDICINE

## 2021-03-01 PROCEDURE — 85025 COMPLETE CBC W/AUTO DIFF WBC: CPT

## 2021-03-01 PROCEDURE — 36415 COLL VENOUS BLD VENIPUNCTURE: CPT

## 2021-03-01 PROCEDURE — 20600001 HC STEP DOWN PRIVATE ROOM

## 2021-03-01 PROCEDURE — 99239 PR HOSPITAL DISCHARGE DAY,>30 MIN: ICD-10-PCS | Mod: ,,, | Performed by: INTERNAL MEDICINE

## 2021-03-01 PROCEDURE — 84100 ASSAY OF PHOSPHORUS: CPT

## 2021-03-01 RX ORDER — OXYCODONE AND ACETAMINOPHEN 10; 325 MG/1; MG/1
1 TABLET ORAL EVERY 4 HOURS PRN
Status: DISCONTINUED | OUTPATIENT
Start: 2021-03-01 | End: 2021-03-02 | Stop reason: HOSPADM

## 2021-03-01 RX ORDER — HYDROMORPHONE HYDROCHLORIDE 1 MG/ML
0.5 INJECTION, SOLUTION INTRAMUSCULAR; INTRAVENOUS; SUBCUTANEOUS ONCE
Status: COMPLETED | OUTPATIENT
Start: 2021-03-01 | End: 2021-03-01

## 2021-03-01 RX ORDER — DOXYCYCLINE 100 MG/1
100 CAPSULE ORAL EVERY 12 HOURS
Qty: 14 CAPSULE | Refills: 0 | Status: SHIPPED | OUTPATIENT
Start: 2021-03-01 | End: 2021-03-08

## 2021-03-01 RX ORDER — HYDROMORPHONE HYDROCHLORIDE 1 MG/ML
1 INJECTION, SOLUTION INTRAMUSCULAR; INTRAVENOUS; SUBCUTANEOUS ONCE
Status: COMPLETED | OUTPATIENT
Start: 2021-03-01 | End: 2021-03-01

## 2021-03-01 RX ADMIN — HYDROMORPHONE HYDROCHLORIDE 0.5 MG: 1 INJECTION, SOLUTION INTRAMUSCULAR; INTRAVENOUS; SUBCUTANEOUS at 08:03

## 2021-03-01 RX ADMIN — HYDROMORPHONE HYDROCHLORIDE 1 MG: 1 INJECTION, SOLUTION INTRAMUSCULAR; INTRAVENOUS; SUBCUTANEOUS at 08:03

## 2021-03-01 RX ADMIN — OXYCODONE AND ACETAMINOPHEN 1 TABLET: 10; 325 TABLET ORAL at 10:03

## 2021-03-01 RX ADMIN — CARVEDILOL 25 MG: 25 TABLET, FILM COATED ORAL at 08:03

## 2021-03-01 RX ADMIN — HYDROMORPHONE HYDROCHLORIDE 1 MG: 1 INJECTION, SOLUTION INTRAMUSCULAR; INTRAVENOUS; SUBCUTANEOUS at 03:03

## 2021-03-01 RX ADMIN — OXYCODONE AND ACETAMINOPHEN 1 TABLET: 10; 325 TABLET ORAL at 02:03

## 2021-03-01 RX ADMIN — APIXABAN 5 MG: 5 TABLET, FILM COATED ORAL at 08:03

## 2021-03-01 RX ADMIN — DIPHENHYDRAMINE HYDROCHLORIDE 25 MG: 25 CAPSULE ORAL at 08:03

## 2021-03-01 RX ADMIN — HYDROXYUREA 1000 MG: 500 CAPSULE ORAL at 08:03

## 2021-03-01 RX ADMIN — SENNOSIDES 8.6 MG: 8.6 TABLET, FILM COATED ORAL at 08:03

## 2021-03-01 RX ADMIN — HYDROCHLOROTHIAZIDE 25 MG: 12.5 TABLET ORAL at 08:03

## 2021-03-01 RX ADMIN — CARBAMAZEPINE 800 MG: 200 TABLET ORAL at 08:03

## 2021-03-01 RX ADMIN — AMLODIPINE BESYLATE 10 MG: 10 TABLET ORAL at 08:03

## 2021-03-01 RX ADMIN — GABAPENTIN 800 MG: 400 CAPSULE ORAL at 08:03

## 2021-03-01 RX ADMIN — FOLIC ACID 1 MG: 1 TABLET ORAL at 08:03

## 2021-03-01 RX ADMIN — FLUTICASONE PROPIONATE 50 MCG: 50 SPRAY, METERED NASAL at 08:03

## 2021-03-01 RX ADMIN — MUPIROCIN: 20 OINTMENT TOPICAL at 08:03

## 2021-03-01 RX ADMIN — FLUOXETINE 40 MG: 20 CAPSULE ORAL at 08:03

## 2021-03-01 RX ADMIN — OXYCODONE AND ACETAMINOPHEN 1 TABLET: 10; 325 TABLET ORAL at 07:03

## 2021-03-02 VITALS
HEIGHT: 62 IN | WEIGHT: 247 LBS | BODY MASS INDEX: 45.45 KG/M2 | SYSTOLIC BLOOD PRESSURE: 133 MMHG | OXYGEN SATURATION: 96 % | HEART RATE: 75 BPM | RESPIRATION RATE: 20 BRPM | DIASTOLIC BLOOD PRESSURE: 83 MMHG | TEMPERATURE: 98 F

## 2021-03-02 DIAGNOSIS — E87.6 HYPOKALEMIA: Primary | ICD-10-CM

## 2021-03-02 LAB
ALBUMIN SERPL BCP-MCNC: 3.1 G/DL (ref 3.5–5.2)
ALP SERPL-CCNC: 59 U/L (ref 55–135)
ALT SERPL W/O P-5'-P-CCNC: 15 U/L (ref 10–44)
ANION GAP SERPL CALC-SCNC: 14 MMOL/L (ref 8–16)
ANISOCYTOSIS BLD QL SMEAR: SLIGHT
AST SERPL-CCNC: 18 U/L (ref 10–40)
BASOPHILS # BLD AUTO: 0.01 K/UL (ref 0–0.2)
BASOPHILS NFR BLD: 0.3 % (ref 0–1.9)
BILIRUB SERPL-MCNC: 0.5 MG/DL (ref 0.1–1)
BUN SERPL-MCNC: 7 MG/DL (ref 6–20)
CALCIUM SERPL-MCNC: 8.4 MG/DL (ref 8.7–10.5)
CHLORIDE SERPL-SCNC: 100 MMOL/L (ref 95–110)
CO2 SERPL-SCNC: 27 MMOL/L (ref 23–29)
CREAT SERPL-MCNC: 0.8 MG/DL (ref 0.5–1.4)
DIFFERENTIAL METHOD: ABNORMAL
EOSINOPHIL # BLD AUTO: 0.1 K/UL (ref 0–0.5)
EOSINOPHIL NFR BLD: 1.8 % (ref 0–8)
ERYTHROCYTE [DISTWIDTH] IN BLOOD BY AUTOMATED COUNT: 21.7 % (ref 11.5–14.5)
EST. GFR  (AFRICAN AMERICAN): >60 ML/MIN/1.73 M^2
EST. GFR  (NON AFRICAN AMERICAN): >60 ML/MIN/1.73 M^2
GLUCOSE SERPL-MCNC: 92 MG/DL (ref 70–110)
HCT VFR BLD AUTO: 29.5 % (ref 37–48.5)
HGB BLD-MCNC: 9.9 G/DL (ref 12–16)
HYPOCHROMIA BLD QL SMEAR: ABNORMAL
IMM GRANULOCYTES # BLD AUTO: 0 K/UL (ref 0–0.04)
IMM GRANULOCYTES NFR BLD AUTO: 0 % (ref 0–0.5)
LYMPHOCYTES # BLD AUTO: 2.3 K/UL (ref 1–4.8)
LYMPHOCYTES NFR BLD: 57.5 % (ref 18–48)
MAGNESIUM SERPL-MCNC: 2.1 MG/DL (ref 1.6–2.6)
MCH RBC QN AUTO: 29.4 PG (ref 27–31)
MCHC RBC AUTO-ENTMCNC: 33.6 G/DL (ref 32–36)
MCV RBC AUTO: 88 FL (ref 82–98)
MONOCYTES # BLD AUTO: 0.3 K/UL (ref 0.3–1)
MONOCYTES NFR BLD: 7 % (ref 4–15)
NEUTROPHILS # BLD AUTO: 1.3 K/UL (ref 1.8–7.7)
NEUTROPHILS NFR BLD: 33.4 % (ref 38–73)
NRBC BLD-RTO: 11 /100 WBC
OVALOCYTES BLD QL SMEAR: ABNORMAL
PHOSPHATE SERPL-MCNC: 2.9 MG/DL (ref 2.7–4.5)
PLATELET # BLD AUTO: 186 K/UL (ref 150–350)
PLATELET BLD QL SMEAR: ABNORMAL
PMV BLD AUTO: 9.2 FL (ref 9.2–12.9)
POIKILOCYTOSIS BLD QL SMEAR: SLIGHT
POLYCHROMASIA BLD QL SMEAR: ABNORMAL
POTASSIUM SERPL-SCNC: 3 MMOL/L (ref 3.5–5.1)
PROT SERPL-MCNC: 6.5 G/DL (ref 6–8.4)
RBC # BLD AUTO: 3.37 M/UL (ref 4–5.4)
SODIUM SERPL-SCNC: 141 MMOL/L (ref 136–145)
TARGETS BLD QL SMEAR: ABNORMAL
WBC # BLD AUTO: 4 K/UL (ref 3.9–12.7)

## 2021-03-02 PROCEDURE — 83735 ASSAY OF MAGNESIUM: CPT

## 2021-03-02 PROCEDURE — 80053 COMPREHEN METABOLIC PANEL: CPT

## 2021-03-02 PROCEDURE — 63600175 PHARM REV CODE 636 W HCPCS: Performed by: STUDENT IN AN ORGANIZED HEALTH CARE EDUCATION/TRAINING PROGRAM

## 2021-03-02 PROCEDURE — 36415 COLL VENOUS BLD VENIPUNCTURE: CPT

## 2021-03-02 PROCEDURE — 85025 COMPLETE CBC W/AUTO DIFF WBC: CPT

## 2021-03-02 PROCEDURE — 84100 ASSAY OF PHOSPHORUS: CPT

## 2021-03-02 RX ORDER — HYDROMORPHONE HYDROCHLORIDE 1 MG/ML
0.5 INJECTION, SOLUTION INTRAMUSCULAR; INTRAVENOUS; SUBCUTANEOUS ONCE
Status: COMPLETED | OUTPATIENT
Start: 2021-03-02 | End: 2021-03-02

## 2021-03-02 RX ORDER — HYDROMORPHONE HYDROCHLORIDE 1 MG/ML
0.5 INJECTION, SOLUTION INTRAMUSCULAR; INTRAVENOUS; SUBCUTANEOUS ONCE
Status: DISCONTINUED | OUTPATIENT
Start: 2021-03-02 | End: 2021-03-02

## 2021-03-02 RX ORDER — POTASSIUM CHLORIDE 20 MEQ/1
20 TABLET, EXTENDED RELEASE ORAL DAILY
Qty: 4 TABLET | Refills: 0 | Status: SHIPPED | OUTPATIENT
Start: 2021-03-02

## 2021-03-02 RX ADMIN — HYDROMORPHONE HYDROCHLORIDE 0.5 MG: 1 INJECTION, SOLUTION INTRAMUSCULAR; INTRAVENOUS; SUBCUTANEOUS at 12:03

## 2021-03-03 ENCOUNTER — PATIENT OUTREACH (OUTPATIENT)
Dept: ADMINISTRATIVE | Facility: CLINIC | Age: 41
End: 2021-03-03

## 2021-03-09 ENCOUNTER — PATIENT MESSAGE (OUTPATIENT)
Dept: HEMATOLOGY/ONCOLOGY | Facility: CLINIC | Age: 41
End: 2021-03-09

## 2021-03-09 DIAGNOSIS — D57.00 HB-SS DISEASE WITH CRISIS: ICD-10-CM

## 2021-03-09 RX ORDER — OXYCODONE AND ACETAMINOPHEN 10; 325 MG/1; MG/1
1 TABLET ORAL EVERY 4 HOURS PRN
Qty: 120 TABLET | Refills: 0 | Status: SHIPPED | OUTPATIENT
Start: 2021-03-09 | End: 2021-03-26 | Stop reason: SDUPTHER

## 2021-03-11 ENCOUNTER — TELEPHONE (OUTPATIENT)
Dept: HEMATOLOGY/ONCOLOGY | Facility: CLINIC | Age: 41
End: 2021-03-11

## 2021-03-15 ENCOUNTER — TELEPHONE (OUTPATIENT)
Dept: HEMATOLOGY/ONCOLOGY | Facility: CLINIC | Age: 41
End: 2021-03-15

## 2021-03-19 ENCOUNTER — OFFICE VISIT (OUTPATIENT)
Dept: HEMATOLOGY/ONCOLOGY | Facility: CLINIC | Age: 41
End: 2021-03-19
Payer: MEDICAID

## 2021-03-19 ENCOUNTER — LAB VISIT (OUTPATIENT)
Dept: LAB | Facility: HOSPITAL | Age: 41
End: 2021-03-19
Attending: INTERNAL MEDICINE
Payer: MEDICAID

## 2021-03-19 VITALS
SYSTOLIC BLOOD PRESSURE: 178 MMHG | HEIGHT: 62 IN | OXYGEN SATURATION: 99 % | DIASTOLIC BLOOD PRESSURE: 101 MMHG | TEMPERATURE: 98 F | BODY MASS INDEX: 53.92 KG/M2 | RESPIRATION RATE: 17 BRPM | WEIGHT: 293 LBS | HEART RATE: 78 BPM

## 2021-03-19 DIAGNOSIS — D57.00 SICKLE-CELL DISEASE WITH PAIN: ICD-10-CM

## 2021-03-19 DIAGNOSIS — S83.241A TEAR OF MEDIAL MENISCUS OF RIGHT KNEE, CURRENT, UNSPECIFIED TEAR TYPE, INITIAL ENCOUNTER: Primary | ICD-10-CM

## 2021-03-19 DIAGNOSIS — D57.00 HB-SS DISEASE WITH CRISIS: ICD-10-CM

## 2021-03-19 DIAGNOSIS — D57.40 SICKLE CELL BETA THALASSEMIA: ICD-10-CM

## 2021-03-19 DIAGNOSIS — M62.838 MUSCLE SPASM: ICD-10-CM

## 2021-03-19 DIAGNOSIS — D50.0 IRON DEFICIENCY ANEMIA DUE TO CHRONIC BLOOD LOSS: ICD-10-CM

## 2021-03-19 LAB
ALBUMIN SERPL BCP-MCNC: 3.6 G/DL (ref 3.5–5.2)
ALP SERPL-CCNC: 69 U/L (ref 55–135)
ALT SERPL W/O P-5'-P-CCNC: 12 U/L (ref 10–44)
ANION GAP SERPL CALC-SCNC: 10 MMOL/L (ref 8–16)
AST SERPL-CCNC: 20 U/L (ref 10–40)
BASOPHILS # BLD AUTO: 0.03 K/UL (ref 0–0.2)
BASOPHILS NFR BLD: 0.6 % (ref 0–1.9)
BILIRUB SERPL-MCNC: 0.3 MG/DL (ref 0.1–1)
BUN SERPL-MCNC: 14 MG/DL (ref 6–20)
CALCIUM SERPL-MCNC: 8.8 MG/DL (ref 8.7–10.5)
CHLORIDE SERPL-SCNC: 107 MMOL/L (ref 95–110)
CO2 SERPL-SCNC: 25 MMOL/L (ref 23–29)
CREAT SERPL-MCNC: 0.9 MG/DL (ref 0.5–1.4)
DIFFERENTIAL METHOD: ABNORMAL
EOSINOPHIL # BLD AUTO: 0.1 K/UL (ref 0–0.5)
EOSINOPHIL NFR BLD: 2.6 % (ref 0–8)
ERYTHROCYTE [DISTWIDTH] IN BLOOD BY AUTOMATED COUNT: 18.4 % (ref 11.5–14.5)
EST. GFR  (AFRICAN AMERICAN): >60 ML/MIN/1.73 M^2
EST. GFR  (NON AFRICAN AMERICAN): >60 ML/MIN/1.73 M^2
FERRITIN SERPL-MCNC: 112 NG/ML (ref 20–300)
GLUCOSE SERPL-MCNC: 112 MG/DL (ref 70–110)
HCT VFR BLD AUTO: 32 % (ref 37–48.5)
HGB BLD-MCNC: 10.9 G/DL (ref 12–16)
IMM GRANULOCYTES # BLD AUTO: 0 K/UL (ref 0–0.04)
IMM GRANULOCYTES NFR BLD AUTO: 0 % (ref 0–0.5)
IRON SERPL-MCNC: 66 UG/DL (ref 30–160)
LYMPHOCYTES # BLD AUTO: 2.1 K/UL (ref 1–4.8)
LYMPHOCYTES NFR BLD: 44.9 % (ref 18–48)
MCH RBC QN AUTO: 29.6 PG (ref 27–31)
MCHC RBC AUTO-ENTMCNC: 34.1 G/DL (ref 32–36)
MCV RBC AUTO: 87 FL (ref 82–98)
MONOCYTES # BLD AUTO: 0.4 K/UL (ref 0.3–1)
MONOCYTES NFR BLD: 9.2 % (ref 4–15)
NEUTROPHILS # BLD AUTO: 2 K/UL (ref 1.8–7.7)
NEUTROPHILS NFR BLD: 42.7 % (ref 38–73)
NRBC BLD-RTO: 1 /100 WBC
PLATELET # BLD AUTO: 182 K/UL (ref 150–350)
PMV BLD AUTO: 9 FL (ref 9.2–12.9)
POTASSIUM SERPL-SCNC: 3.8 MMOL/L (ref 3.5–5.1)
PROT SERPL-MCNC: 7.2 G/DL (ref 6–8.4)
RBC # BLD AUTO: 3.68 M/UL (ref 4–5.4)
RETICS/RBC NFR AUTO: 3.9 % (ref 0.5–2.5)
SATURATED IRON: 20 % (ref 20–50)
SODIUM SERPL-SCNC: 142 MMOL/L (ref 136–145)
TOTAL IRON BINDING CAPACITY: 337 UG/DL (ref 250–450)
TRANSFERRIN SERPL-MCNC: 228 MG/DL (ref 200–375)
WBC # BLD AUTO: 4.68 K/UL (ref 3.9–12.7)

## 2021-03-19 PROCEDURE — 85025 COMPLETE CBC W/AUTO DIFF WBC: CPT | Performed by: INTERNAL MEDICINE

## 2021-03-19 PROCEDURE — 83540 ASSAY OF IRON: CPT | Performed by: INTERNAL MEDICINE

## 2021-03-19 PROCEDURE — 99999 PR PBB SHADOW E&M-EST. PATIENT-LVL IV: ICD-10-PCS | Mod: PBBFAC,,, | Performed by: INTERNAL MEDICINE

## 2021-03-19 PROCEDURE — 82728 ASSAY OF FERRITIN: CPT | Performed by: INTERNAL MEDICINE

## 2021-03-19 PROCEDURE — 99214 OFFICE O/P EST MOD 30 MIN: CPT | Mod: PBBFAC | Performed by: INTERNAL MEDICINE

## 2021-03-19 PROCEDURE — 99999 PR PBB SHADOW E&M-EST. PATIENT-LVL IV: CPT | Mod: PBBFAC,,, | Performed by: INTERNAL MEDICINE

## 2021-03-19 PROCEDURE — 80053 COMPREHEN METABOLIC PANEL: CPT | Performed by: INTERNAL MEDICINE

## 2021-03-19 PROCEDURE — 85045 AUTOMATED RETICULOCYTE COUNT: CPT | Performed by: INTERNAL MEDICINE

## 2021-03-19 PROCEDURE — 99214 OFFICE O/P EST MOD 30 MIN: CPT | Mod: S$PBB,,, | Performed by: INTERNAL MEDICINE

## 2021-03-19 PROCEDURE — 99214 PR OFFICE/OUTPT VISIT, EST, LEVL IV, 30-39 MIN: ICD-10-PCS | Mod: S$PBB,,, | Performed by: INTERNAL MEDICINE

## 2021-03-19 PROCEDURE — 36415 COLL VENOUS BLD VENIPUNCTURE: CPT | Performed by: INTERNAL MEDICINE

## 2021-03-19 RX ORDER — CYCLOBENZAPRINE HCL 10 MG
10 TABLET ORAL 3 TIMES DAILY PRN
Qty: 90 TABLET | Refills: 1 | Status: SHIPPED | OUTPATIENT
Start: 2021-03-19 | End: 2021-03-29

## 2021-03-20 RX ORDER — GLUTAMINE 5 G/1
15 POWDER, FOR SOLUTION ORAL 2 TIMES DAILY
Qty: 90 PACKET | Refills: 5 | OUTPATIENT
Start: 2021-03-20 | End: 2021-03-24 | Stop reason: SDUPTHER

## 2021-03-22 ENCOUNTER — SPECIALTY PHARMACY (OUTPATIENT)
Dept: PHARMACY | Facility: CLINIC | Age: 41
End: 2021-03-22

## 2021-03-22 DIAGNOSIS — D57.40 SICKLE CELL BETA THALASSEMIA: Primary | ICD-10-CM

## 2021-03-24 DIAGNOSIS — D57.40 SICKLE CELL BETA THALASSEMIA: ICD-10-CM

## 2021-03-24 RX ORDER — GLUTAMINE 5 G/1
15 POWDER, FOR SOLUTION ORAL 2 TIMES DAILY
Qty: 180 PACKET | Refills: 5 | Status: SHIPPED | OUTPATIENT
Start: 2021-03-24

## 2021-03-26 ENCOUNTER — PATIENT MESSAGE (OUTPATIENT)
Dept: HEMATOLOGY/ONCOLOGY | Facility: CLINIC | Age: 41
End: 2021-03-26

## 2021-03-26 DIAGNOSIS — D57.00 HB-SS DISEASE WITH CRISIS: ICD-10-CM

## 2021-03-26 RX ORDER — OXYCODONE AND ACETAMINOPHEN 10; 325 MG/1; MG/1
1 TABLET ORAL EVERY 4 HOURS PRN
Qty: 120 TABLET | Refills: 0 | Status: SHIPPED | OUTPATIENT
Start: 2021-03-26 | End: 2021-04-13 | Stop reason: SDUPTHER

## 2021-03-29 ENCOUNTER — SPECIALTY PHARMACY (OUTPATIENT)
Dept: PHARMACY | Facility: CLINIC | Age: 41
End: 2021-03-29

## 2021-03-29 DIAGNOSIS — D57.40 SICKLE CELL BETA THALASSEMIA: Primary | ICD-10-CM

## 2021-04-12 ENCOUNTER — TELEPHONE (OUTPATIENT)
Dept: HEMATOLOGY/ONCOLOGY | Facility: CLINIC | Age: 41
End: 2021-04-12

## 2021-04-13 DIAGNOSIS — D57.00 HB-SS DISEASE WITH CRISIS: ICD-10-CM

## 2021-04-13 RX ORDER — OXYCODONE AND ACETAMINOPHEN 10; 325 MG/1; MG/1
1 TABLET ORAL EVERY 4 HOURS PRN
Qty: 120 TABLET | Refills: 0 | Status: SHIPPED | OUTPATIENT
Start: 2021-04-13 | End: 2021-05-20

## 2021-04-22 ENCOUNTER — PATIENT MESSAGE (OUTPATIENT)
Dept: PHARMACY | Facility: CLINIC | Age: 41
End: 2021-04-22

## 2021-04-29 DIAGNOSIS — F43.20 ADJUSTMENT DISORDER, UNSPECIFIED TYPE: ICD-10-CM

## 2021-04-29 DIAGNOSIS — J45.20 INTERMITTENT ASTHMA, UNSPECIFIED ASTHMA SEVERITY, UNSPECIFIED WHETHER COMPLICATED: ICD-10-CM

## 2021-04-29 RX ORDER — FLUOXETINE HYDROCHLORIDE 40 MG/1
CAPSULE ORAL
Qty: 30 CAPSULE | Refills: 2 | Status: SHIPPED | OUTPATIENT
Start: 2021-04-29 | End: 2021-08-02

## 2021-04-29 RX ORDER — FLUTICASONE PROPIONATE 50 MCG
SPRAY, SUSPENSION (ML) NASAL
Qty: 16 ML | Refills: 0 | Status: SHIPPED | OUTPATIENT
Start: 2021-04-29 | End: 2021-06-02

## 2021-05-02 DIAGNOSIS — R11.0 NAUSEA: ICD-10-CM

## 2021-05-03 RX ORDER — PROMETHAZINE HYDROCHLORIDE 25 MG/1
25 TABLET ORAL EVERY 6 HOURS PRN
Qty: 30 TABLET | Refills: 2 | Status: SHIPPED | OUTPATIENT
Start: 2021-05-03 | End: 2021-05-07 | Stop reason: SDUPTHER

## 2021-05-05 ENCOUNTER — SPECIALTY PHARMACY (OUTPATIENT)
Dept: PHARMACY | Facility: CLINIC | Age: 41
End: 2021-05-05

## 2021-05-06 DIAGNOSIS — R11.0 NAUSEA: ICD-10-CM

## 2021-05-06 RX ORDER — MORPHINE SULFATE 2 MG/ML
2 INJECTION, SOLUTION INTRAMUSCULAR; INTRAVENOUS ONCE AS NEEDED
Status: CANCELLED | OUTPATIENT
Start: 2021-05-06 | End: 2032-10-01

## 2021-05-06 RX ORDER — DIPHENHYDRAMINE HYDROCHLORIDE 50 MG/ML
25 INJECTION INTRAMUSCULAR; INTRAVENOUS ONCE
Status: CANCELLED | OUTPATIENT
Start: 2021-05-06

## 2021-05-06 RX ORDER — SODIUM CHLORIDE 0.9 % (FLUSH) 0.9 %
10 SYRINGE (ML) INJECTION
Status: CANCELLED | OUTPATIENT
Start: 2021-05-06

## 2021-05-06 RX ORDER — HEPARIN 100 UNIT/ML
500 SYRINGE INTRAVENOUS
Status: CANCELLED | OUTPATIENT
Start: 2021-05-06

## 2021-05-07 ENCOUNTER — INFUSION (OUTPATIENT)
Dept: INFUSION THERAPY | Facility: OTHER | Age: 41
End: 2021-05-07
Attending: STUDENT IN AN ORGANIZED HEALTH CARE EDUCATION/TRAINING PROGRAM
Payer: MEDICAID

## 2021-05-07 VITALS — HEART RATE: 105 BPM | RESPIRATION RATE: 19 BRPM | OXYGEN SATURATION: 95 % | TEMPERATURE: 99 F

## 2021-05-07 DIAGNOSIS — R11.0 NAUSEA: Primary | ICD-10-CM

## 2021-05-07 DIAGNOSIS — D57.00 HB-SS DISEASE WITH VASO-OCCLUSIVE PAIN: Primary | ICD-10-CM

## 2021-05-07 PROCEDURE — 96374 THER/PROPH/DIAG INJ IV PUSH: CPT

## 2021-05-07 PROCEDURE — 96361 HYDRATE IV INFUSION ADD-ON: CPT

## 2021-05-07 PROCEDURE — 25000003 PHARM REV CODE 250: Performed by: NURSE PRACTITIONER

## 2021-05-07 PROCEDURE — 63600175 PHARM REV CODE 636 W HCPCS: Performed by: NURSE PRACTITIONER

## 2021-05-07 PROCEDURE — 96375 TX/PRO/DX INJ NEW DRUG ADDON: CPT

## 2021-05-07 PROCEDURE — 96376 TX/PRO/DX INJ SAME DRUG ADON: CPT

## 2021-05-07 RX ORDER — DIPHENHYDRAMINE HYDROCHLORIDE 50 MG/ML
25 INJECTION INTRAMUSCULAR; INTRAVENOUS
Status: COMPLETED | OUTPATIENT
Start: 2021-05-07 | End: 2021-05-07

## 2021-05-07 RX ORDER — MORPHINE SULFATE 4 MG/ML
2 INJECTION, SOLUTION INTRAMUSCULAR; INTRAVENOUS ONCE AS NEEDED
Status: COMPLETED | OUTPATIENT
Start: 2021-05-07 | End: 2021-05-07

## 2021-05-07 RX ORDER — MORPHINE SULFATE 4 MG/ML
1 INJECTION, SOLUTION INTRAMUSCULAR; INTRAVENOUS ONCE
Status: COMPLETED | OUTPATIENT
Start: 2021-05-07 | End: 2021-05-07

## 2021-05-07 RX ORDER — PROMETHAZINE HYDROCHLORIDE 25 MG/1
25 TABLET ORAL EVERY 6 HOURS PRN
Qty: 30 TABLET | Refills: 2 | Status: SHIPPED | OUTPATIENT
Start: 2021-05-07

## 2021-05-07 RX ORDER — SODIUM CHLORIDE 0.9 % (FLUSH) 0.9 %
10 SYRINGE (ML) INJECTION
Status: DISCONTINUED | OUTPATIENT
Start: 2021-05-07 | End: 2021-05-07 | Stop reason: HOSPADM

## 2021-05-07 RX ORDER — ONDANSETRON HYDROCHLORIDE 8 MG/1
8 TABLET, FILM COATED ORAL EVERY 12 HOURS PRN
Qty: 30 TABLET | Refills: 2 | Status: SHIPPED | OUTPATIENT
Start: 2021-05-07 | End: 2021-08-18

## 2021-05-07 RX ORDER — DIPHENHYDRAMINE HYDROCHLORIDE 50 MG/ML
25 INJECTION INTRAMUSCULAR; INTRAVENOUS ONCE
Status: COMPLETED | OUTPATIENT
Start: 2021-05-07 | End: 2021-05-07

## 2021-05-07 RX ORDER — HEPARIN 100 UNIT/ML
500 SYRINGE INTRAVENOUS
Status: DISCONTINUED | OUTPATIENT
Start: 2021-05-07 | End: 2021-05-07 | Stop reason: HOSPADM

## 2021-05-07 RX ADMIN — SODIUM CHLORIDE 1000 ML: 0.9 INJECTION, SOLUTION INTRAVENOUS at 02:05

## 2021-05-07 RX ADMIN — DIPHENHYDRAMINE HYDROCHLORIDE 25 MG: 50 INJECTION INTRAMUSCULAR; INTRAVENOUS at 02:05

## 2021-05-07 RX ADMIN — DIPHENHYDRAMINE HYDROCHLORIDE 25 MG: 50 INJECTION INTRAMUSCULAR; INTRAVENOUS at 03:05

## 2021-05-07 RX ADMIN — MORPHINE SULFATE 2 MG: 4 INJECTION INTRAVENOUS at 02:05

## 2021-05-07 RX ADMIN — MORPHINE SULFATE 1 MG: 4 INJECTION INTRAVENOUS at 03:05

## 2021-05-20 DIAGNOSIS — D57.00 HB-SS DISEASE WITH CRISIS: ICD-10-CM

## 2021-05-20 RX ORDER — OXYCODONE AND ACETAMINOPHEN 10; 325 MG/1; MG/1
1 TABLET ORAL EVERY 4 HOURS PRN
Qty: 120 TABLET | Refills: 0 | Status: SHIPPED | OUTPATIENT
Start: 2021-05-20 | End: 2021-06-08 | Stop reason: SDUPTHER

## 2021-05-20 RX ORDER — SODIUM CHLORIDE 0.9 % (FLUSH) 0.9 %
10 SYRINGE (ML) INJECTION
Status: CANCELLED | OUTPATIENT
Start: 2021-05-20 | Stop reason: HOSPADM

## 2021-05-20 RX ORDER — MORPHINE SULFATE 4 MG/ML
2 INJECTION, SOLUTION INTRAMUSCULAR; INTRAVENOUS ONCE AS NEEDED
Status: CANCELLED | OUTPATIENT
Start: 2021-05-20 | End: 2032-10-15 | Stop reason: HOSPADM

## 2021-05-20 RX ORDER — DIPHENHYDRAMINE HYDROCHLORIDE 50 MG/ML
25 INJECTION INTRAMUSCULAR; INTRAVENOUS ONCE
Status: CANCELLED | OUTPATIENT
Start: 2021-05-20 | Stop reason: HOSPADM

## 2021-05-20 RX ORDER — HEPARIN 100 UNIT/ML
500 SYRINGE INTRAVENOUS
Status: CANCELLED | OUTPATIENT
Start: 2021-05-20 | Stop reason: HOSPADM

## 2021-05-20 RX ORDER — OXYCODONE AND ACETAMINOPHEN 10; 325 MG/1; MG/1
1 TABLET ORAL EVERY 4 HOURS PRN
Qty: 120 TABLET | Refills: 0 | Status: SHIPPED | OUTPATIENT
Start: 2021-05-20 | End: 2021-05-20 | Stop reason: SDUPTHER

## 2021-05-31 ENCOUNTER — SPECIALTY PHARMACY (OUTPATIENT)
Dept: PHARMACY | Facility: CLINIC | Age: 41
End: 2021-05-31

## 2021-06-08 DIAGNOSIS — D57.00 HB-SS DISEASE WITH CRISIS: ICD-10-CM

## 2021-06-08 RX ORDER — OXYCODONE AND ACETAMINOPHEN 10; 325 MG/1; MG/1
1 TABLET ORAL EVERY 4 HOURS PRN
Qty: 120 TABLET | Refills: 0 | Status: SHIPPED | OUTPATIENT
Start: 2021-06-08 | End: 2021-06-28 | Stop reason: SDUPTHER

## 2021-06-21 ENCOUNTER — TELEPHONE (OUTPATIENT)
Dept: PHARMACY | Facility: CLINIC | Age: 41
End: 2021-06-21

## 2021-06-28 DIAGNOSIS — D57.00 HB-SS DISEASE WITH CRISIS: ICD-10-CM

## 2021-06-29 RX ORDER — OXYCODONE AND ACETAMINOPHEN 10; 325 MG/1; MG/1
1 TABLET ORAL EVERY 4 HOURS PRN
Qty: 120 TABLET | Refills: 0 | Status: SHIPPED | OUTPATIENT
Start: 2021-06-29 | End: 2021-07-19 | Stop reason: SDUPTHER

## 2021-07-16 ENCOUNTER — PATIENT MESSAGE (OUTPATIENT)
Dept: HEMATOLOGY/ONCOLOGY | Facility: CLINIC | Age: 41
End: 2021-07-16

## 2021-07-19 DIAGNOSIS — D57.00 HB-SS DISEASE WITH CRISIS: ICD-10-CM

## 2021-07-19 RX ORDER — OXYCODONE AND ACETAMINOPHEN 10; 325 MG/1; MG/1
1 TABLET ORAL EVERY 4 HOURS PRN
Qty: 120 TABLET | Refills: 0 | Status: SHIPPED | OUTPATIENT
Start: 2021-07-19 | End: 2021-08-05 | Stop reason: SDUPTHER

## 2021-07-21 NOTE — ASSESSMENT & PLAN NOTE
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     Plan:  -oxygen (hold fluids because of interstitial edema on CXR)  -pain control with tylenol and prn oxycodone     No masses; no nipple discharge

## 2021-08-05 ENCOUNTER — TELEPHONE (OUTPATIENT)
Dept: HEMATOLOGY/ONCOLOGY | Facility: CLINIC | Age: 41
End: 2021-08-05

## 2021-08-05 DIAGNOSIS — D57.00 HB-SS DISEASE WITH CRISIS: ICD-10-CM

## 2021-08-05 DIAGNOSIS — D57.1 HB-SS DISEASE WITHOUT CRISIS: ICD-10-CM

## 2021-08-05 DIAGNOSIS — G50.0 TRIGEMINAL NEURALGIA: ICD-10-CM

## 2021-08-05 NOTE — ASSESSMENT & PLAN NOTE
See SAMUEL acute blood loss     31 yo male p/w left ankle pain. Twisted left ankle jogging on irregular surface. Was able to walk afterwards. No weakness, head trauma, or dizziness. No meds taken. NKDA

## 2021-08-06 DIAGNOSIS — D57.00 HB-SS DISEASE WITH CRISIS: ICD-10-CM

## 2021-08-06 DIAGNOSIS — D57.1 HB-SS DISEASE WITHOUT CRISIS: ICD-10-CM

## 2021-08-06 DIAGNOSIS — G50.0 TRIGEMINAL NEURALGIA: ICD-10-CM

## 2021-08-06 RX ORDER — OXYCODONE AND ACETAMINOPHEN 10; 325 MG/1; MG/1
1 TABLET ORAL EVERY 4 HOURS PRN
Qty: 120 TABLET | Refills: 0 | Status: SHIPPED | OUTPATIENT
Start: 2021-08-06 | End: 2021-08-26 | Stop reason: SDUPTHER

## 2021-08-06 RX ORDER — GABAPENTIN 400 MG/1
800 CAPSULE ORAL 3 TIMES DAILY
Qty: 180 CAPSULE | Refills: 0 | Status: CANCELLED | OUTPATIENT
Start: 2021-08-06 | End: 2021-09-05

## 2021-08-06 RX ORDER — OXYCODONE AND ACETAMINOPHEN 10; 325 MG/1; MG/1
1 TABLET ORAL EVERY 4 HOURS PRN
Qty: 120 TABLET | Refills: 0 | Status: SHIPPED | OUTPATIENT
Start: 2021-08-06 | End: 2021-08-06 | Stop reason: SDUPTHER

## 2021-08-06 RX ORDER — GABAPENTIN 400 MG/1
800 CAPSULE ORAL 3 TIMES DAILY
Qty: 180 CAPSULE | Refills: 0 | Status: SHIPPED | OUTPATIENT
Start: 2021-08-06 | End: 2021-08-18

## 2021-08-06 RX ORDER — OXYCODONE AND ACETAMINOPHEN 10; 325 MG/1; MG/1
1 TABLET ORAL EVERY 4 HOURS PRN
Qty: 120 TABLET | Refills: 0 | OUTPATIENT
Start: 2021-08-06 | End: 2022-08-06

## 2021-08-26 ENCOUNTER — PATIENT MESSAGE (OUTPATIENT)
Dept: HEMATOLOGY/ONCOLOGY | Facility: CLINIC | Age: 41
End: 2021-08-26

## 2021-08-26 DIAGNOSIS — D57.00 HB-SS DISEASE WITH CRISIS: ICD-10-CM

## 2021-08-26 RX ORDER — OXYCODONE AND ACETAMINOPHEN 10; 325 MG/1; MG/1
1 TABLET ORAL EVERY 4 HOURS PRN
Qty: 120 TABLET | Refills: 0 | Status: SHIPPED | OUTPATIENT
Start: 2021-08-26 | End: 2022-08-26

## 2021-10-26 RX ORDER — IPRATROPIUM BROMIDE AND ALBUTEROL SULFATE 2.5; .5 MG/3ML; MG/3ML
3 SOLUTION RESPIRATORY (INHALATION) EVERY 6 HOURS PRN
Qty: 360 ML | Refills: 2 | Status: CANCELLED | OUTPATIENT
Start: 2021-10-26 | End: 2022-10-26

## 2021-10-27 RX ORDER — IPRATROPIUM BROMIDE AND ALBUTEROL SULFATE 2.5; .5 MG/3ML; MG/3ML
3 SOLUTION RESPIRATORY (INHALATION) EVERY 6 HOURS PRN
Qty: 360 ML | Refills: 2 | Status: SHIPPED | OUTPATIENT
Start: 2021-10-27 | End: 2022-10-27

## 2022-03-14 NOTE — PLAN OF CARE
02/14/18 0950   Discharge Assessment   Assessment Type Discharge Planning Assessment   Confirmed/corrected address and phone number on facesheet? Yes   Assessment information obtained from? Patient   Expected Length of Stay (days) 2   Communicated expected length of stay with patient/caregiver yes   Prior to hospitilization cognitive status: Alert/Oriented   Prior to hospitalization functional status: Independent   Current cognitive status: Alert/Oriented   Current Functional Status: Independent   Lives With parent(s)  (mother, Phyllis Benson ([phone]))   Able to Return to Prior Arrangements yes   Is patient able to care for self after discharge? Yes   Patient's perception of discharge disposition home or selfcare   Readmission Within The Last 30 Days no previous admission in last 30 days   Patient currently being followed by outpatient case management? No   Patient currently receives any other outside agency services? No   Equipment Currently Used at Home none   Do you have any problems affording any of your prescribed medications? No   Is the patient taking medications as prescribed? yes   Does the patient have transportation home? Yes   Transportation Available family or friend will provide  (mother)   Does the patient receive services at the Coumadin Clinic? No   Discharge Plan A Home with family   Discharge Plan B Home Health   Patient/Family In Agreement With Plan yes     Patient resting quietly in bed when CM rounded. No family present. Patient was admitted with sickle cell crisis. Patient lives with her mother, Phyllis Benson, & is independent of all ADLs. Plan to discharge patient home with support when medically stable. Patient stated that her mother will provide transportation at time of discharge. Message sent to Dr. Montgomery's (hem/onc) nurse requesting a hospital follow up appointment in 1-2 weeks. Dr. Montgomery's nurse to call the patient with appointment date & time. Will continue to  follow.    Topical Sulfur Applications Pregnancy And Lactation Text: This medication is Pregnancy Category C and has an unknown safety profile during pregnancy. It is unknown if this topical medication is excreted in breast milk.

## 2022-03-22 NOTE — PLAN OF CARE
20mg oxy 12hr tab given at 1137. 2mg IV dilaudid given at 0852 and 1303. Benadryl given 1518. Pts pain is now being managed with PCA pump. Plan of care discussed with patient. Fall precautions in place. Discussed medications and care. Patient has no questions at this time. Will continue to monitor.   Imiquimod Counseling:  I discussed with the patient the risks of imiquimod including but not limited to erythema, scaling, itching, weeping, crusting, and pain.  Patient understands that the inflammatory response to imiquimod is variable from person to person and was educated regarded proper titration schedule.  If flu-like symptoms develop, patient knows to discontinue the medication and contact us.

## 2023-02-24 NOTE — PLAN OF CARE
Chief Complaint   Patient presents with   • Eye Problem     Painful eyes. A little pink. No itching. Mom has not noticed any drainage. Started today. Has been sleeping more.       SUBJECTIVE:  Maegan Phan is a 3 year old who presents with the complaint of bilateral eye irritation for the past 8 hours. The patient came home from school and said that her eyes hurt. She told her Mom that she had to clean out her eyes this morning. She is otherwise feeling well, no congestion, no fevers.  The patient does not wear contact lenses or eye glasses.     OBJECTIVE:  Vitals:    02/24/23 1700   Pulse: 99   Resp: 22   Temp: 98.8 °F (37.1 °C)     PHYSICAL EXAMINATION:   EYES: bilateral - EOMI, PERRL, conjunctiva w/ erythema, sclera w/ erythema and mild periorbital edema, worse on the right versus the left.  NECK:  No periauricular lymph nodes, +anterior and posterior cervical lymphadenopathy  PHYSICAL EXAMINATION:  EARS AND NOSE AND THROAT:  Tympanic membranes are clear without erythema or effusions. Oropharynx is without any erythema, exudates, lesions, posterior oropharyngeal lymphoid hyperplasia.  Single, midline uvula.   LUNGS:  Clear to auscultation bilaterally.  No retractions or accessory muscle use.  CARDIAC:  Regular rate and rhythm, no murmur.  No peripheral edema.  EXTREMITIES:  No clubbing, cyanosis or nail pitting.  Warm and well-perfused.  SKIN:  No rashes or suspicious lesions.  Palpation reveals no nodules.    ASSESSMENT/PLAN:  1. Acute conjunctivitis of both eyes, unspecified acute conjunctivitis type      Eye drops as directed.  Reviewed that this could be early sign of viral syndrome.  However given that she has no additional symptoms today recommended empirically treating with antibiotic eyedrop.  Follow up with Ophthalmologist in 24-48 hours if symptoms of diminished vision or increase in pain.    Yoselin Sidhu PA-C   Plan of care discussed with patient. Patient is free of fall/trauma/injury. Denies CP, SOB. Patient has complaints of leg pain, dilaudid 2mg PRN Q3H. Patient on menstrual cycle. All questions addressed. Will continue to monitor

## 2023-06-07 NOTE — SUBJECTIVE & OBJECTIVE
Interval History: Ms. Malone feels about the same today with regard to her SOB. She denies any chest pain, but still reports diffuse chest tightness. On 2L NC.    Review of Systems   Constitutional: Positive for activity change and chills. Negative for fever and unexpected weight change.   HENT: Positive for congestion. Negative for hearing loss, sore throat, tinnitus and trouble swallowing.    Eyes: Negative for pain and visual disturbance.   Respiratory: Positive for cough, chest tightness and shortness of breath.    Cardiovascular: Negative for chest pain and leg swelling.   Gastrointestinal: Negative for abdominal pain, blood in stool, nausea and vomiting.   Endocrine: Negative for cold intolerance and heat intolerance.   Genitourinary: Negative for decreased urine volume and difficulty urinating.   Musculoskeletal: Positive for back pain and myalgias.        Bilateral leg pain, left arm pain   Skin: Negative for color change and rash.   Neurological: Positive for numbness and headaches.     Objective:     Vital Signs (Most Recent):  Temp: 98.7 °F (37.1 °C) (07/31/19 1600)  Pulse: 84 (07/31/19 1603)  Resp: 18 (07/31/19 1603)  BP: (!) 146/80 (07/31/19 1600)  SpO2: (!) 93 % (07/31/19 1603) Vital Signs (24h Range):  Temp:  [97.5 °F (36.4 °C)-98.7 °F (37.1 °C)] 98.7 °F (37.1 °C)  Pulse:  [79-95] 84  Resp:  [16-20] 18  SpO2:  [93 %-100 %] 93 %  BP: (129-178)/(65-98) 146/80     Weight: 136.1 kg (300 lb)  Body mass index is 54.87 kg/m².    Intake/Output Summary (Last 24 hours) at 7/31/2019 2027  Last data filed at 7/31/2019 1812  Gross per 24 hour   Intake 1250 ml   Output --   Net 1250 ml      Physical Exam   Constitutional: She appears well-developed and well-nourished. No distress.   HENT:   Head: Normocephalic.   Eyes: Pupils are equal, round, and reactive to light. Conjunctivae and EOM are normal. No scleral icterus.   Neck: Normal range of motion. Neck supple.   Cardiovascular: Normal rate, regular rhythm and  normal heart sounds.   Pulmonary/Chest: She has wheezes.   Bilaterally (much improved)   Abdominal: Soft. Bowel sounds are normal. She exhibits no distension. There is no tenderness.   Musculoskeletal: She exhibits no edema or tenderness.   Skin: Skin is warm and dry. No rash noted. She is not diaphoretic. No erythema.       Significant Labs: All pertinent labs within the past 24 hours have been reviewed.    Significant Imaging: I have reviewed and interpreted all pertinent imaging results/findings within the past 24 hours.   Methotrexate Pregnancy And Lactation Text: This medication is Pregnancy Category X and is known to cause fetal harm. This medication is excreted in breast milk.

## 2023-09-05 NOTE — TELEPHONE ENCOUNTER
----- Message from Eugene Clark sent at 9/28/2017  3:27 PM CDT -----  Contact: Pt   Refill on oxycodone-acetaminophen (PERCOCET)  mg per tablet    Contact::[phone]  Spoke with pt at 345pm on 09/28/17 and explained that her refill request has been approved and sent to her pharmacy, pt verbalized understanding.  Dayna  
Continue Regimen: Doxycycline 100 mg take one tablet po BID until current prescription is finished.\\nMupurocin ointment BID
Render In Strict Bullet Format?: No
Detail Level: Simple

## 2024-05-05 NOTE — TELEPHONE ENCOUNTER
----- Message from Eugene Clark sent at 4/20/2018  1:25 PM CDT -----  Contact: Pt   Pt is requesting refill on oxyCODONE-acetaminophen (PERCOCET)  mg per tablet    Will like Rx to be sent to [facility]     Pt Contact::[phone]   Acute hypoxic respiratory failure

## 2024-06-24 ENCOUNTER — TELEPHONE (OUTPATIENT)
Dept: HEMATOLOGY/ONCOLOGY | Facility: CLINIC | Age: 44
End: 2024-06-24

## 2024-06-24 NOTE — TELEPHONE ENCOUNTER
----- Message from Violette Castellon sent at 6/24/2024 10:46 AM CDT -----  Regarding: Refill  Contact: Pt  [phone]            Rx Name:    Lyrica                      Tizanidine                      Prozac      Preferred Pharmacy with number:    [facility]/pharmacy #[id number] - [place], [place] - [address]  [address]  [place] [address]  Phone: [phone] Fax: [phone]     Ordering Provider: Pee Montgomery MD     Contact preference: [phone]    Comments/Notes:   Requesting refill states she has moved back to  [facility]

## 2024-06-26 ENCOUNTER — HOSPITAL ENCOUNTER (INPATIENT)
Facility: HOSPITAL | Age: 44
LOS: 15 days | Discharge: HOME | DRG: 811 | End: 2024-07-12
Attending: STUDENT IN AN ORGANIZED HEALTH CARE EDUCATION/TRAINING PROGRAM | Admitting: HOSPITALIST
Payer: MEDICARE

## 2024-06-26 DIAGNOSIS — R94.31 PROLONGATION OF QRS COMPLEX ON ELECTROCARDIOGRAPHY: ICD-10-CM

## 2024-06-26 DIAGNOSIS — D57.40 SICKLE CELL BETA THALASSEMIA: ICD-10-CM

## 2024-06-26 DIAGNOSIS — F43.20 ADJUSTMENT DISORDER, UNSPECIFIED TYPE: ICD-10-CM

## 2024-06-26 DIAGNOSIS — I10 ESSENTIAL HYPERTENSION: ICD-10-CM

## 2024-06-26 DIAGNOSIS — G93.40 ENCEPHALOPATHY: ICD-10-CM

## 2024-06-26 DIAGNOSIS — D57.00 SICKLE CELL PAIN CRISIS: Primary | ICD-10-CM

## 2024-06-26 DIAGNOSIS — D57.00 SICKLE-CELL DISEASE WITH PAIN: ICD-10-CM

## 2024-06-26 DIAGNOSIS — R07.9 CHEST PAIN: ICD-10-CM

## 2024-06-26 DIAGNOSIS — I27.82 CHRONIC SEPTIC PULMONARY EMBOLISM WITH ACUTE COR PULMONALE: ICD-10-CM

## 2024-06-26 DIAGNOSIS — I26.01 CHRONIC SEPTIC PULMONARY EMBOLISM WITH ACUTE COR PULMONALE: ICD-10-CM

## 2024-06-26 DIAGNOSIS — I38 ENDOCARDITIS: ICD-10-CM

## 2024-06-26 DIAGNOSIS — R11.0 NAUSEA: ICD-10-CM

## 2024-06-26 LAB
ALBUMIN SERPL BCP-MCNC: 3.8 G/DL (ref 3.5–5.2)
ALLENS TEST: ABNORMAL
ALLENS TEST: ABNORMAL
ALP SERPL-CCNC: 79 U/L (ref 55–135)
ALT SERPL W/O P-5'-P-CCNC: 7 U/L (ref 10–44)
ANION GAP SERPL CALC-SCNC: 11 MMOL/L (ref 8–16)
APTT PPP: 25.5 SEC (ref 21–32)
AST SERPL-CCNC: 17 U/L (ref 10–40)
B-HCG UR QL: NEGATIVE
BASOPHILS # BLD AUTO: 0.04 K/UL (ref 0–0.2)
BASOPHILS NFR BLD: 0.7 % (ref 0–1.9)
BILIRUB SERPL-MCNC: 1.1 MG/DL (ref 0.1–1)
BILIRUB UR QL STRIP: NEGATIVE
BNP SERPL-MCNC: 31 PG/ML (ref 0–99)
BUN SERPL-MCNC: 5 MG/DL (ref 6–30)
BUN SERPL-MCNC: 7 MG/DL (ref 6–20)
CALCIUM SERPL-MCNC: 10.2 MG/DL (ref 8.7–10.5)
CHLORIDE SERPL-SCNC: 101 MMOL/L (ref 95–110)
CHLORIDE SERPL-SCNC: 106 MMOL/L (ref 95–110)
CK SERPL-CCNC: 89 U/L (ref 20–180)
CLARITY UR REFRACT.AUTO: CLEAR
CO2 SERPL-SCNC: 24 MMOL/L (ref 23–29)
COLOR UR AUTO: YELLOW
CREAT SERPL-MCNC: 0.9 MG/DL (ref 0.5–1.4)
CREAT SERPL-MCNC: 1.2 MG/DL (ref 0.5–1.4)
CTP QC/QA: YES
D DIMER PPP IA.FEU-MCNC: 1.8 MG/L FEU
DIFFERENTIAL METHOD BLD: ABNORMAL
EOSINOPHIL # BLD AUTO: 0.2 K/UL (ref 0–0.5)
EOSINOPHIL NFR BLD: 2.6 % (ref 0–8)
ERYTHROCYTE [DISTWIDTH] IN BLOOD BY AUTOMATED COUNT: 20.8 % (ref 11.5–14.5)
EST. GFR  (NO RACE VARIABLE): 56.5 ML/MIN/1.73 M^2
GLUCOSE SERPL-MCNC: 140 MG/DL (ref 70–110)
GLUCOSE SERPL-MCNC: 148 MG/DL (ref 70–110)
GLUCOSE UR QL STRIP: NEGATIVE
HCO3 UR-SCNC: 25.6 MMOL/L (ref 24–28)
HCT VFR BLD AUTO: 35.6 % (ref 37–48.5)
HCT VFR BLD CALC: 40 %PCV (ref 36–54)
HCV AB SERPL QL IA: NORMAL
HGB BLD-MCNC: 12.1 G/DL (ref 12–16)
HGB UR QL STRIP: NEGATIVE
HIV 1+2 AB+HIV1 P24 AG SERPL QL IA: NORMAL
IMM GRANULOCYTES # BLD AUTO: 0.01 K/UL (ref 0–0.04)
IMM GRANULOCYTES NFR BLD AUTO: 0.2 % (ref 0–0.5)
INR PPP: 1 (ref 0.8–1.2)
KETONES UR QL STRIP: NEGATIVE
LDH SERPL L TO P-CCNC: 2.79 MMOL/L (ref 0.5–2.2)
LEUKOCYTE ESTERASE UR QL STRIP: NEGATIVE
LIPASE SERPL-CCNC: 19 U/L (ref 4–60)
LYMPHOCYTES # BLD AUTO: 1.9 K/UL (ref 1–4.8)
LYMPHOCYTES NFR BLD: 31.1 % (ref 18–48)
MAGNESIUM SERPL-MCNC: 1.9 MG/DL (ref 1.6–2.6)
MCH RBC QN AUTO: 25.8 PG (ref 27–31)
MCHC RBC AUTO-ENTMCNC: 34 G/DL (ref 32–36)
MCV RBC AUTO: 76 FL (ref 82–98)
MONOCYTES # BLD AUTO: 0.5 K/UL (ref 0.3–1)
MONOCYTES NFR BLD: 8.8 % (ref 4–15)
NEUTROPHILS # BLD AUTO: 3.5 K/UL (ref 1.8–7.7)
NEUTROPHILS NFR BLD: 56.6 % (ref 38–73)
NITRITE UR QL STRIP: NEGATIVE
NRBC BLD-RTO: 0 /100 WBC
OHS QRS DURATION: 94 MS
OHS QTC CALCULATION: 430 MS
PCO2 BLDA: 37.9 MMHG (ref 35–45)
PH SMN: 7.44 [PH] (ref 7.35–7.45)
PH UR STRIP: 7 [PH] (ref 5–8)
PLATELET # BLD AUTO: 333 K/UL (ref 150–450)
PMV BLD AUTO: 10 FL (ref 9.2–12.9)
PO2 BLDA: 39 MMHG (ref 40–60)
POC BE: 1 MMOL/L
POC IONIZED CALCIUM: 1.21 MMOL/L (ref 1.06–1.42)
POC SATURATED O2: 76 % (ref 95–100)
POC TCO2 (MEASURED): 23 MMOL/L (ref 23–29)
POC TCO2: 27 MMOL/L (ref 24–29)
POTASSIUM BLD-SCNC: 2.9 MMOL/L (ref 3.5–5.1)
POTASSIUM SERPL-SCNC: 3 MMOL/L (ref 3.5–5.1)
PROT SERPL-MCNC: 7.8 G/DL (ref 6–8.4)
PROT UR QL STRIP: NEGATIVE
PROTHROMBIN TIME: 11.4 SEC (ref 9–12.5)
RBC # BLD AUTO: 4.69 M/UL (ref 4–5.4)
RETICS/RBC NFR AUTO: 1.3 % (ref 0.5–2.5)
SAMPLE: ABNORMAL
SITE: ABNORMAL
SITE: ABNORMAL
SODIUM BLD-SCNC: 141 MMOL/L (ref 136–145)
SODIUM SERPL-SCNC: 141 MMOL/L (ref 136–145)
SP GR UR STRIP: 1.01 (ref 1–1.03)
T4 FREE SERPL-MCNC: 0.87 NG/DL (ref 0.71–1.51)
TROPONIN I SERPL DL<=0.01 NG/ML-MCNC: <0.006 NG/ML (ref 0–0.03)
TROPONIN I SERPL DL<=0.01 NG/ML-MCNC: <0.006 NG/ML (ref 0–0.03)
TSH SERPL DL<=0.005 MIU/L-ACNC: 0.17 UIU/ML (ref 0.4–4)
URN SPEC COLLECT METH UR: NORMAL
WBC # BLD AUTO: 6.15 K/UL (ref 3.9–12.7)

## 2024-06-26 PROCEDURE — 99900035 HC TECH TIME PER 15 MIN (STAT)

## 2024-06-26 PROCEDURE — 84443 ASSAY THYROID STIM HORMONE: CPT | Performed by: STUDENT IN AN ORGANIZED HEALTH CARE EDUCATION/TRAINING PROGRAM

## 2024-06-26 PROCEDURE — 96374 THER/PROPH/DIAG INJ IV PUSH: CPT

## 2024-06-26 PROCEDURE — 86803 HEPATITIS C AB TEST: CPT | Performed by: PHYSICIAN ASSISTANT

## 2024-06-26 PROCEDURE — 63600175 PHARM REV CODE 636 W HCPCS: Performed by: STUDENT IN AN ORGANIZED HEALTH CARE EDUCATION/TRAINING PROGRAM

## 2024-06-26 PROCEDURE — 25500020 PHARM REV CODE 255: Performed by: STUDENT IN AN ORGANIZED HEALTH CARE EDUCATION/TRAINING PROGRAM

## 2024-06-26 PROCEDURE — 85379 FIBRIN DEGRADATION QUANT: CPT | Performed by: STUDENT IN AN ORGANIZED HEALTH CARE EDUCATION/TRAINING PROGRAM

## 2024-06-26 PROCEDURE — 83605 ASSAY OF LACTIC ACID: CPT

## 2024-06-26 PROCEDURE — 84439 ASSAY OF FREE THYROXINE: CPT | Performed by: STUDENT IN AN ORGANIZED HEALTH CARE EDUCATION/TRAINING PROGRAM

## 2024-06-26 PROCEDURE — 85045 AUTOMATED RETICULOCYTE COUNT: CPT | Performed by: STUDENT IN AN ORGANIZED HEALTH CARE EDUCATION/TRAINING PROGRAM

## 2024-06-26 PROCEDURE — G0378 HOSPITAL OBSERVATION PER HR: HCPCS

## 2024-06-26 PROCEDURE — 85025 COMPLETE CBC W/AUTO DIFF WBC: CPT | Performed by: STUDENT IN AN ORGANIZED HEALTH CARE EDUCATION/TRAINING PROGRAM

## 2024-06-26 PROCEDURE — 87040 BLOOD CULTURE FOR BACTERIA: CPT | Mod: 59 | Performed by: STUDENT IN AN ORGANIZED HEALTH CARE EDUCATION/TRAINING PROGRAM

## 2024-06-26 PROCEDURE — 96375 TX/PRO/DX INJ NEW DRUG ADDON: CPT

## 2024-06-26 PROCEDURE — 87389 HIV-1 AG W/HIV-1&-2 AB AG IA: CPT | Performed by: PHYSICIAN ASSISTANT

## 2024-06-26 PROCEDURE — 84484 ASSAY OF TROPONIN QUANT: CPT | Performed by: STUDENT IN AN ORGANIZED HEALTH CARE EDUCATION/TRAINING PROGRAM

## 2024-06-26 PROCEDURE — 87186 SC STD MICRODIL/AGAR DIL: CPT | Mod: 59 | Performed by: STUDENT IN AN ORGANIZED HEALTH CARE EDUCATION/TRAINING PROGRAM

## 2024-06-26 PROCEDURE — 81003 URINALYSIS AUTO W/O SCOPE: CPT | Performed by: STUDENT IN AN ORGANIZED HEALTH CARE EDUCATION/TRAINING PROGRAM

## 2024-06-26 PROCEDURE — 80053 COMPREHEN METABOLIC PANEL: CPT | Performed by: STUDENT IN AN ORGANIZED HEALTH CARE EDUCATION/TRAINING PROGRAM

## 2024-06-26 PROCEDURE — 87077 CULTURE AEROBIC IDENTIFY: CPT | Mod: 59 | Performed by: STUDENT IN AN ORGANIZED HEALTH CARE EDUCATION/TRAINING PROGRAM

## 2024-06-26 PROCEDURE — 63600175 PHARM REV CODE 636 W HCPCS: Performed by: INTERNAL MEDICINE

## 2024-06-26 PROCEDURE — 85610 PROTHROMBIN TIME: CPT | Performed by: STUDENT IN AN ORGANIZED HEALTH CARE EDUCATION/TRAINING PROGRAM

## 2024-06-26 PROCEDURE — 82803 BLOOD GASES ANY COMBINATION: CPT

## 2024-06-26 PROCEDURE — 99285 EMERGENCY DEPT VISIT HI MDM: CPT | Mod: 25

## 2024-06-26 PROCEDURE — 83735 ASSAY OF MAGNESIUM: CPT | Performed by: STUDENT IN AN ORGANIZED HEALTH CARE EDUCATION/TRAINING PROGRAM

## 2024-06-26 PROCEDURE — 87150 DNA/RNA AMPLIFIED PROBE: CPT | Mod: 59 | Performed by: STUDENT IN AN ORGANIZED HEALTH CARE EDUCATION/TRAINING PROGRAM

## 2024-06-26 PROCEDURE — 25000003 PHARM REV CODE 250: Performed by: INTERNAL MEDICINE

## 2024-06-26 PROCEDURE — 96361 HYDRATE IV INFUSION ADD-ON: CPT

## 2024-06-26 PROCEDURE — 82550 ASSAY OF CK (CPK): CPT | Performed by: STUDENT IN AN ORGANIZED HEALTH CARE EDUCATION/TRAINING PROGRAM

## 2024-06-26 PROCEDURE — 83690 ASSAY OF LIPASE: CPT | Performed by: STUDENT IN AN ORGANIZED HEALTH CARE EDUCATION/TRAINING PROGRAM

## 2024-06-26 PROCEDURE — 81025 URINE PREGNANCY TEST: CPT | Performed by: STUDENT IN AN ORGANIZED HEALTH CARE EDUCATION/TRAINING PROGRAM

## 2024-06-26 PROCEDURE — 85730 THROMBOPLASTIN TIME PARTIAL: CPT | Performed by: STUDENT IN AN ORGANIZED HEALTH CARE EDUCATION/TRAINING PROGRAM

## 2024-06-26 PROCEDURE — 83880 ASSAY OF NATRIURETIC PEPTIDE: CPT | Performed by: STUDENT IN AN ORGANIZED HEALTH CARE EDUCATION/TRAINING PROGRAM

## 2024-06-26 PROCEDURE — 25000003 PHARM REV CODE 250: Performed by: STUDENT IN AN ORGANIZED HEALTH CARE EDUCATION/TRAINING PROGRAM

## 2024-06-26 PROCEDURE — 96376 TX/PRO/DX INJ SAME DRUG ADON: CPT

## 2024-06-26 RX ORDER — CARBAMAZEPINE 200 MG/1
800 TABLET ORAL 2 TIMES DAILY
Status: DISCONTINUED | OUTPATIENT
Start: 2024-06-26 | End: 2024-07-02

## 2024-06-26 RX ORDER — DIPHENHYDRAMINE HYDROCHLORIDE 50 MG/ML
25 INJECTION INTRAMUSCULAR; INTRAVENOUS
Status: COMPLETED | OUTPATIENT
Start: 2024-06-26 | End: 2024-06-26

## 2024-06-26 RX ORDER — PROMETHAZINE HYDROCHLORIDE 12.5 MG/1
25 TABLET ORAL EVERY 6 HOURS PRN
Status: DISCONTINUED | OUTPATIENT
Start: 2024-06-26 | End: 2024-07-12 | Stop reason: HOSPADM

## 2024-06-26 RX ORDER — TALC
6 POWDER (GRAM) TOPICAL NIGHTLY PRN
Status: DISCONTINUED | OUTPATIENT
Start: 2024-06-26 | End: 2024-06-30

## 2024-06-26 RX ORDER — HYDROMORPHONE HYDROCHLORIDE 1 MG/ML
2 INJECTION, SOLUTION INTRAMUSCULAR; INTRAVENOUS; SUBCUTANEOUS EVERY 4 HOURS PRN
Status: DISCONTINUED | OUTPATIENT
Start: 2024-06-26 | End: 2024-06-30

## 2024-06-26 RX ORDER — SODIUM CHLORIDE 9 MG/ML
INJECTION, SOLUTION INTRAVENOUS CONTINUOUS
Status: DISCONTINUED | OUTPATIENT
Start: 2024-06-26 | End: 2024-07-01

## 2024-06-26 RX ORDER — GABAPENTIN 400 MG/1
800 CAPSULE ORAL 3 TIMES DAILY
Status: DISCONTINUED | OUTPATIENT
Start: 2024-06-26 | End: 2024-07-02

## 2024-06-26 RX ORDER — HYDROXYUREA 500 MG/1
1000 CAPSULE ORAL DAILY
Status: DISCONTINUED | OUTPATIENT
Start: 2024-06-27 | End: 2024-06-26

## 2024-06-26 RX ORDER — FLUOXETINE HYDROCHLORIDE 20 MG/1
40 CAPSULE ORAL DAILY
Status: DISCONTINUED | OUTPATIENT
Start: 2024-06-27 | End: 2024-07-12 | Stop reason: HOSPADM

## 2024-06-26 RX ORDER — PROCHLORPERAZINE EDISYLATE 5 MG/ML
2.5 INJECTION INTRAMUSCULAR; INTRAVENOUS EVERY 6 HOURS PRN
Status: CANCELLED | OUTPATIENT
Start: 2024-06-26

## 2024-06-26 RX ORDER — DIPHENHYDRAMINE HYDROCHLORIDE 50 MG/ML
25 INJECTION INTRAMUSCULAR; INTRAVENOUS EVERY 4 HOURS PRN
Status: DISCONTINUED | OUTPATIENT
Start: 2024-06-26 | End: 2024-06-30

## 2024-06-26 RX ORDER — POTASSIUM CHLORIDE 20 MEQ/1
20 TABLET, EXTENDED RELEASE ORAL DAILY
Status: DISCONTINUED | OUTPATIENT
Start: 2024-06-27 | End: 2024-07-12 | Stop reason: HOSPADM

## 2024-06-26 RX ORDER — HYDROMORPHONE HYDROCHLORIDE 1 MG/ML
1 INJECTION, SOLUTION INTRAMUSCULAR; INTRAVENOUS; SUBCUTANEOUS
Status: COMPLETED | OUTPATIENT
Start: 2024-06-26 | End: 2024-06-26

## 2024-06-26 RX ORDER — SODIUM CHLORIDE 0.9 % (FLUSH) 0.9 %
10 SYRINGE (ML) INJECTION
Status: DISCONTINUED | OUTPATIENT
Start: 2024-06-26 | End: 2024-07-03

## 2024-06-26 RX ORDER — AMLODIPINE BESYLATE 10 MG/1
10 TABLET ORAL DAILY
Status: DISCONTINUED | OUTPATIENT
Start: 2024-06-27 | End: 2024-07-12 | Stop reason: HOSPADM

## 2024-06-26 RX ORDER — ONDANSETRON HYDROCHLORIDE 2 MG/ML
4 INJECTION, SOLUTION INTRAVENOUS
Status: COMPLETED | OUTPATIENT
Start: 2024-06-26 | End: 2024-06-26

## 2024-06-26 RX ORDER — CARVEDILOL 25 MG/1
25 TABLET ORAL 2 TIMES DAILY
Status: DISCONTINUED | OUTPATIENT
Start: 2024-06-26 | End: 2024-07-12 | Stop reason: HOSPADM

## 2024-06-26 RX ADMIN — CARVEDILOL 25 MG: 25 TABLET, FILM COATED ORAL at 10:06

## 2024-06-26 RX ADMIN — ONDANSETRON 4 MG: 2 INJECTION INTRAMUSCULAR; INTRAVENOUS at 01:06

## 2024-06-26 RX ADMIN — IOHEXOL 100 ML: 350 INJECTION, SOLUTION INTRAVENOUS at 02:06

## 2024-06-26 RX ADMIN — APIXABAN 5 MG: 5 TABLET, FILM COATED ORAL at 03:06

## 2024-06-26 RX ADMIN — CARBAMAZEPINE 800 MG: 200 TABLET ORAL at 10:06

## 2024-06-26 RX ADMIN — SODIUM CHLORIDE, POTASSIUM CHLORIDE, SODIUM LACTATE AND CALCIUM CHLORIDE 1000 ML: 600; 310; 30; 20 INJECTION, SOLUTION INTRAVENOUS at 01:06

## 2024-06-26 RX ADMIN — DIPHENHYDRAMINE HYDROCHLORIDE 25 MG: 50 INJECTION, SOLUTION INTRAMUSCULAR; INTRAVENOUS at 01:06

## 2024-06-26 RX ADMIN — HYDROMORPHONE HYDROCHLORIDE 1 MG: 1 INJECTION, SOLUTION INTRAMUSCULAR; INTRAVENOUS; SUBCUTANEOUS at 04:06

## 2024-06-26 RX ADMIN — DIPHENHYDRAMINE HYDROCHLORIDE 25 MG: 50 INJECTION, SOLUTION INTRAMUSCULAR; INTRAVENOUS at 11:06

## 2024-06-26 RX ADMIN — HYDROMORPHONE HYDROCHLORIDE 2 MG: 1 INJECTION, SOLUTION INTRAMUSCULAR; INTRAVENOUS; SUBCUTANEOUS at 11:06

## 2024-06-26 RX ADMIN — POTASSIUM BICARBONATE 40 MEQ: 391 TABLET, EFFERVESCENT ORAL at 02:06

## 2024-06-26 RX ADMIN — GABAPENTIN 800 MG: 400 CAPSULE ORAL at 10:06

## 2024-06-26 RX ADMIN — SODIUM CHLORIDE: 9 INJECTION, SOLUTION INTRAVENOUS at 11:06

## 2024-06-26 RX ADMIN — DIPHENHYDRAMINE HYDROCHLORIDE 25 MG: 50 INJECTION, SOLUTION INTRAMUSCULAR; INTRAVENOUS at 07:06

## 2024-06-26 RX ADMIN — HYDROMORPHONE HYDROCHLORIDE 1 MG: 1 INJECTION, SOLUTION INTRAMUSCULAR; INTRAVENOUS; SUBCUTANEOUS at 01:06

## 2024-06-26 RX ADMIN — HYDROMORPHONE HYDROCHLORIDE 2 MG: 1 INJECTION, SOLUTION INTRAMUSCULAR; INTRAVENOUS; SUBCUTANEOUS at 06:06

## 2024-06-26 RX ADMIN — APIXABAN 5 MG: 5 TABLET, FILM COATED ORAL at 10:06

## 2024-06-26 RX ADMIN — HYDROMORPHONE HYDROCHLORIDE 1 MG: 1 INJECTION, SOLUTION INTRAMUSCULAR; INTRAVENOUS; SUBCUTANEOUS at 02:06

## 2024-06-26 NOTE — ASSESSMENT & PLAN NOTE
Patient's anemia is currently controlled. Has not received any PRBCs to date. Etiology likely d/t chronic blood loss  Current CBC reviewed-   Lab Results   Component Value Date    HGB 12.1 06/26/2024    HCT 40 06/26/2024     Monitor serial CBC and transfuse if patient becomes hemodynamically unstable, symptomatic or H/H drops below 7/21.

## 2024-06-26 NOTE — ED NOTES
I-STAT Chem-8+ Results:   Value Reference Range   Sodium 141 136-145 mmol/L   Potassium  2.9 3.5-5.1 mmol/L   Chloride 101  mmol/L   Ionized Calcium 1.21 1.06-1.42 mmol/L   CO2 (measured) 23 23-29 mmol/L   Glucose 148  mg/dL   BUN 5 6-30 mg/dL   Creatinine 0.9 0.5-1.4 mg/dL   Hematocrit 40 36-54%

## 2024-06-26 NOTE — ED TRIAGE NOTES
"Nazanin Malone, a 44 y.o. 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 PAIN  8/10  SKIN: warm, dry and intact. No breakdown or bruising.  MUSCULOSKELETAL: Patient moving all extremities spontaneously, no obvious swelling or deformities noted. Ambulates independently.  RESPIRATORY: endorses shortness of breath.Respirations unlabored.   CARDIAC: endorses CP, 2+ distal pulses; no peripheral edema  ABDOMEN: S/ND/NT, endorses nausea  : voids spontaneously, denies difficulty  Neurologic: AAO x 4; follows commands equal strength in all extremities; denies numbness/tingling. Denies dizziness    "

## 2024-06-26 NOTE — H&P
[facility] - Emergency Dept  [facility] Medicine  History & Physical    Patient Name: Nazanin Malone  MRN: [id number]  Patient Class: OP- Observation  Admission Date: 2024  Attending Physician: Baltazar Barth MD   Primary Care Provider: Pee Montgomery MD         Patient information was obtained from patient and ER records.     Subjective:     Principal Problem:Sickle-cell disease with pain    Chief Complaint:   Chief Complaint   Patient presents with    Chest Pain     Chest pain and leg pain beginning 3 days ago        HPI: 44 y.o. female presenting with chest pain and leg pain for about 3 days.  History of acute chest syndrome secondary to sickle cell anemia.  History of sickle cell beta thalassemia.  Has had multiple acute chest syndrome, pneumonia.  Also history of pulmonary embolism but has been off of her Eliquis for several months due to insurance related issues.  Does not take folic acid or hydroxyurea either.  Has a port in the right side of her chest, history of needing exchange transfusions.  Fairly significant history of acute chest syndrome, rhabdomyolysis resulting in fasciotomy of the right lower extremity, just recently as of last year 2023.  Recently moved here from [place], drove here 3 days ago.  No new leg swelling or muscle pains.    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     Current Outpatient Medications on File Prior to Encounter   Medication Sig    amLODIPine (NORVASC) 10 MG tablet TAKE 1 TABLET BY MOUTH EVERY DAY    carvediloL (COREG) 25 MG tablet TAKE 1 TABLET BY MOUTH TWICE A DAY    acetaminophen (TYLENOL) 500 MG tablet Take 1,000 mg by mouth daily as needed for Pain.    albuterol (VENTOLIN HFA) 90 mcg/actuation inhaler Inhale 2 puffs into the lungs every 6 (six) hours as needed for Wheezing or Shortness of Breath. Rescue    albuterol-ipratropium (DUO-NEB) 2.5 mg-0.5 mg/3 mL nebulizer solution Take 3 mLs by nebulization every 6 (six) hours as needed for Wheezing or Shortness of Breath. Rescue    ascorbic acid (VITAMIN C ORAL) Take 1 capsule by mouth once daily.    carBAMazepine (TEGRETOL) 200 mg tablet TAKE 4 TABLETS BY MOUTH TWICE DAILY    ELIQUIS 5 mg Tab TAKE 1 TABLET BY MOUTH TWICE A DAY    FLUoxetine 40 MG capsule TAKE 1 CAPSULE BY MOUTH EVERY DAY    fluticasone propionate (FLONASE) 50 mcg/actuation nasal spray INSTILL 1 SPRAY INTO EACH NOSTRIL EVERY DAY    folic acid (FOLVITE) 1 MG tablet Take 1 tablet (1 mg total) by mouth once daily.    gabapentin (NEURONTIN) 400 MG capsule TAKE 2 CAPSULES BY MOUTH 3 TIMES A DAY    glutamine, sickle cell, (ENDARI) 5 gram PwPk Take 3 packets (15 g) by mouth 2 (two) times daily.    hydroCHLOROthiazide (HYDRODIURIL) 25 MG tablet TAKE 1 TABLET BY MOUTH EVERY DAY    hydroxyurea (HYDREA) 500 mg Cap TAKE 2 CAPSULES (1,000 MG TOTAL) BY MOUTH 2 (TWO) TIMES DAILY.    ondansetron (ZOFRAN) 8 MG tablet TAKE 1 TABLET BY MOUTH EVERY 12 HOURS AS NEEDED FOR NAUSEA    potassium chloride SA (K-DUR,KLOR-CON) 20 MEQ tablet Take 1 tablet (20 mEq total) by mouth once daily.    promethazine (PHENERGAN) 25 MG tablet Take 1 tablet (25 mg total) by mouth every 6 (six) hours as needed for Nausea.    senna-docusate 8.6-50 mg (PERICOLACE) 8.6-50 mg per tablet Take 1 tablet by  mouth 2 (two) times daily as needed for Constipation. (Patient taking differently: Take 1 tablet by mouth daily as needed for Constipation. )     Family History       Problem Relation (Age of Onset)    Diabetes Mother    Heart disease Mother, Father          Tobacco Use    Smoking status: Never    Smokeless tobacco: Never   Substance and Sexual Activity    Alcohol use: No    Drug use: Yes     Types: Marijuana     Comment: periodically     Sexual activity: Not Currently     Birth control/protection: See Surgical Hx     Review of Systems   Constitutional:  Positive for activity change.   HENT: Negative.     Respiratory:  Positive for apnea, chest tightness and shortness of breath.    Endocrine: Negative.    Genitourinary: Negative.    Musculoskeletal: Negative.    Allergic/Immunologic: Negative.    Neurological: Negative.    Hematological: Negative.      Objective:     Vital Signs (Most Recent):  Temp: 98.6 °F (37 °C) (06/26/24 1448)  Pulse: 74 (06/26/24 1448)  Resp: 16 (06/26/24 1603)  BP: (!) 169/100 (06/26/24 1448)  SpO2: 98 % (06/26/24 1448) Vital Signs (24h Range):  Temp:  [98.6 °F (37 °C)-99.3 °F (37.4 °C)] 98.6 °F (37 °C)  Pulse:  [74-98] 74  Resp:  [16] 16  SpO2:  [98 %] 98 %  BP: (162-169)/(100-107) 169/100     Weight: (!) 145.7 kg (321 lb 4.8 oz)  Body mass index is 58.77 kg/m².     Physical Exam  Constitutional:       Appearance: She is ill-appearing.   HENT:      Head: Normocephalic.      Nose: Nose normal.      Mouth/Throat:      Mouth: Mucous membranes are moist.   Cardiovascular:      Rate and Rhythm: Normal rate.      Pulses: Normal pulses.   Pulmonary:      Effort: Pulmonary effort is normal.   Chest:      Chest wall: Tenderness present.   Abdominal:      General: Abdomen is flat.      Palpations: Abdomen is soft.   Musculoskeletal:         General: Normal range of motion.      Cervical back: Normal range of motion.      Comments: Scar on the right lower extremity related to fasciotomy.    Skin:      General: Skin is warm.   Neurological:      General: No focal deficit present.                Significant Labs: All pertinent labs within the past 24 hours have been reviewed.  CBC:   Recent Labs   Lab 06/26/24  1345 06/26/24  1351   WBC 6.15  --    HGB 12.1  --    HCT 35.6* 40     --        Significant Imaging: I have reviewed all pertinent imaging results/findings within the past 24 hours.  Assessment/Plan:     * Sickle-cell disease with pain  History of Acute Chest Syndrome and Pulmonary Embolisim  IVF for now  Dilaudid 2mg q4 prn  Has not been seen here in three years, will consult hem onc to reestablish care      History of pulmonary embolism  CTA today: 1. Examination is essentially nondiagnostic for assessment of the pulmonary arteries beyond the level of the main right and left pulmonary arteries due to suboptimal contrast bolus timing and respiratory motion.  2. No definite acute large central/saddle-type embolus.  Off of Elequis  Will restart Eliquis at 5mg BID, low threshold to increase to treatment dose.      Intermittent asthma  Stable      Anxiety        Trigeminal neuralgia  Complains of mild pain      Iron deficiency anemia  Patient's anemia is currently controlled. Has not received any PRBCs to date. Etiology likely d/t chronic blood loss  Current CBC reviewed-   Lab Results   Component Value Date    HGB 12.1 06/26/2024    HCT 40 06/26/2024     Monitor serial CBC and transfuse if patient becomes hemodynamically unstable, symptomatic or H/H drops below 7/21.      VTE Risk Mitigation (From admission, onward)           Ordered     apixaban tablet 5 mg  2 times daily         06/26/24 1846     IP VTE HIGH RISK PATIENT  Once         06/26/24 1846     Place sequential compression device  Until discontinued         06/26/24 1846                       On 06/26/2024, patient should be placed in hospital observation services under my care.             Baltazar Barth MD  Department of Hospital  Medicine  [facility] - Emergency Dept

## 2024-06-26 NOTE — PLAN OF CARE
Discharge Planning Assessment:  Patient admitted on: 6-26-24  Chart reviewed today  Care plan discussed with treatment team    Current Dispo: new admit, await bed/floor assignment  Self reports just moved back to [place] in the past week, was living in [place].  Transportation: has reliable  Case management to follow for plans and arrangements

## 2024-06-26 NOTE — SUBJECTIVE & OBJECTIVE
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     Current Outpatient Medications on File Prior to Encounter   Medication Sig    amLODIPine (NORVASC) 10 MG tablet TAKE 1 TABLET BY MOUTH EVERY DAY    carvediloL (COREG) 25 MG tablet TAKE 1 TABLET BY MOUTH TWICE A DAY    acetaminophen (TYLENOL) 500 MG tablet Take 1,000 mg by mouth daily as needed for Pain.    albuterol (VENTOLIN HFA) 90 mcg/actuation inhaler Inhale 2 puffs into the lungs every 6 (six) hours as needed for Wheezing or Shortness of Breath. Rescue    albuterol-ipratropium (DUO-NEB) 2.5 mg-0.5 mg/3 mL nebulizer solution Take 3 mLs by nebulization every 6 (six) hours as needed for Wheezing or Shortness of Breath. Rescue    ascorbic acid (VITAMIN C ORAL) Take 1 capsule by mouth once daily.    carBAMazepine (TEGRETOL) 200 mg tablet TAKE 4 TABLETS BY MOUTH TWICE DAILY    ELIQUIS 5 mg Tab TAKE 1 TABLET BY MOUTH TWICE A DAY    FLUoxetine 40 MG capsule TAKE 1 CAPSULE BY MOUTH EVERY DAY    fluticasone propionate (FLONASE) 50 mcg/actuation nasal spray INSTILL 1 SPRAY INTO EACH NOSTRIL EVERY DAY    folic acid (FOLVITE) 1 MG tablet Take 1 tablet (1 mg total) by mouth once daily.    gabapentin (NEURONTIN) 400 MG capsule TAKE 2 CAPSULES BY MOUTH 3 TIMES A DAY    glutamine, sickle cell, (ENDARI) 5 gram  PwPk Take 3 packets (15 g) by mouth 2 (two) times daily.    hydroCHLOROthiazide (HYDRODIURIL) 25 MG tablet TAKE 1 TABLET BY MOUTH EVERY DAY    hydroxyurea (HYDREA) 500 mg Cap TAKE 2 CAPSULES (1,000 MG TOTAL) BY MOUTH 2 (TWO) TIMES DAILY.    ondansetron (ZOFRAN) 8 MG tablet TAKE 1 TABLET BY MOUTH EVERY 12 HOURS AS NEEDED FOR NAUSEA    potassium chloride SA (K-DUR,KLOR-CON) 20 MEQ tablet Take 1 tablet (20 mEq total) by mouth once daily.    promethazine (PHENERGAN) 25 MG tablet Take 1 tablet (25 mg total) by mouth every 6 (six) hours as needed for Nausea.    senna-docusate 8.6-50 mg (PERICOLACE) 8.6-50 mg per tablet Take 1 tablet by mouth 2 (two) times daily as needed for Constipation. (Patient taking differently: Take 1 tablet by mouth daily as needed for Constipation. )     Family History       Problem Relation (Age of Onset)    Diabetes Mother    Heart disease Mother, Father          Tobacco Use    Smoking status: Never    Smokeless tobacco: Never   Substance and Sexual Activity    Alcohol use: No    Drug use: Yes     Types: Marijuana     Comment: periodically     Sexual activity: Not Currently     Birth control/protection: See Surgical Hx     Review of Systems   Constitutional:  Positive for activity change.   HENT: Negative.     Respiratory:  Positive for apnea, chest tightness and shortness of breath.    Endocrine: Negative.    Genitourinary: Negative.    Musculoskeletal: Negative.    Allergic/Immunologic: Negative.    Neurological: Negative.    Hematological: Negative.      Objective:     Vital Signs (Most Recent):  Temp: 98.6 °F (37 °C) (06/26/24 1448)  Pulse: 74 (06/26/24 1448)  Resp: 16 (06/26/24 1603)  BP: (!) 169/100 (06/26/24 1448)  SpO2: 98 % (06/26/24 1448) Vital Signs (24h Range):  Temp:  [98.6 °F (37 °C)-99.3 °F (37.4 °C)] 98.6 °F (37 °C)  Pulse:  [74-98] 74  Resp:  [16] 16  SpO2:  [98 %] 98 %  BP: (162-169)/(100-107) 169/100     Weight: (!) 145.7 kg (321 lb 4.8 oz)  Body mass index is 58.77 kg/m².      Physical Exam  Constitutional:       Appearance: She is ill-appearing.   HENT:      Head: Normocephalic.      Nose: Nose normal.      Mouth/Throat:      Mouth: Mucous membranes are moist.   Cardiovascular:      Rate and Rhythm: Normal rate.      Pulses: Normal pulses.   Pulmonary:      Effort: Pulmonary effort is normal.   Chest:      Chest wall: Tenderness present.   Abdominal:      General: Abdomen is flat.      Palpations: Abdomen is soft.   Musculoskeletal:         General: Normal range of motion.      Cervical back: Normal range of motion.      Comments: Scar on the right lower extremity related to fasciotomy.    Skin:     General: Skin is warm.   Neurological:      General: No focal deficit present.                Significant Labs: All pertinent labs within the past 24 hours have been reviewed.  CBC:   Recent Labs   Lab 06/26/24  1345 06/26/24  1351   WBC 6.15  --    HGB 12.1  --    HCT 35.6* 40     --        Significant Imaging: I have reviewed all pertinent imaging results/findings within the past 24 hours.

## 2024-06-26 NOTE — HPI
44 y.o. female presenting with chest pain and leg pain for about 3 days.  History of acute chest syndrome secondary to sickle cell anemia.  History of sickle cell beta thalassemia.  Has had multiple acute chest syndrome, pneumonia.  Also history of pulmonary embolism but has been off of her Eliquis for several months due to insurance related issues.  Does not take folic acid or hydroxyurea either.  Has a port in the right side of her chest, history of needing exchange transfusions.  Fairly significant history of acute chest syndrome, rhabdomyolysis resulting in fasciotomy of the right lower extremity, just recently as of last year October 20, 2023.  Recently moved here from [place], drove here 3 days ago.  No new leg swelling or muscle pains.

## 2024-06-26 NOTE — ED PROVIDER NOTES
Source of History  patient and EMR    Chief Complaint    Chest Pain (Chest pain and leg pain beginning 3 days ago)      History of Present Illness    Nazanin Malone is a 44 y.o. female presenting with chest pain and leg pain for about 3 days.  History of acute chest syndrome secondary to sickle cell anemia.  History of sickle cell beta thalassemia.  Has had multiple acute chest syndrome, pneumonia.  Also history of pulmonary embolism but has been off of her Eliquis for several months due to insurance related issues.  Does not take folic acid or hydroxyurea either.  Has a port in the right side of her chest, history of needing exchange transfusions.  Fairly significant history of acute chest syndrome, rhabdomyolysis resulting in fasciotomy of the right lower extremity, just recently as of last year October 20, 2023.  Recently moved here from [place], drove here 3 days ago.  No new leg swelling or muscle pains.    Review of Systems    As per HPI and below:  Constitutional symptoms:  No fever present at triage, but states that she has had low-grade temperatures for about 3 days, generalized weakness  Skin symptoms:  No rash    Eye symptoms:  No blurred vision  Respiratory symptoms:  Some shortness of breath with cough  Cardiovascular symptoms:  + chest pain, no leg swelling, no palpitations, no syncope  Gastrointestinal symptoms:  No abdominal pain, no nausea, no vomiting  Musculoskeletal symptoms:  No back pain, aches and pains of the bilateral lower extremities      Past History    As per HPI and below:  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    Asthma     Depression     Hypertension     Morbid obesity     Opioid dependence 4/16/2017    Pneumonia due to Streptococcus pneumoniae 4/25/2017    Right lower lobe pneumonia 4/29/2017    Sepsis due to Streptococcus pneumoniae 4/25/2017    Sickle cell-beta  thalassemia disease with pain     Trigeminal neuralgia        Past Surgical History:   Procedure Laterality Date     SECTION      TONSILLECTOMY      TUBAL LIGATION         Social History     Socioeconomic History    Marital status: Single   Tobacco Use    Smoking status: Never    Smokeless tobacco: Never   Substance and Sexual Activity    Alcohol use: No    Drug use: Yes     Types: Marijuana     Comment: periodically     Sexual activity: Not Currently     Birth control/protection: See Surgical Hx     Social Determinants of Health     Financial Resource Strain: Medium Risk (2024)    Overall Financial Resource Strain (CARDIA)     Difficulty of Paying Living Expenses: Somewhat hard   Food Insecurity: Unknown (2024)    Hunger Vital Sign     Worried About Running Out of Food in the Last Year: Never true   Transportation Needs: No Transportation Needs (2024)    TRANSPORTATION NEEDS     Transportation : No   Physical Activity: Unknown (2024)    Exercise Vital Sign     Days of Exercise per Week: 0 days   Stress: Stress Concern Present (2024)    [employer] [place] [employer] - Occupational Stress Questionnaire     Feeling of Stress : To some extent   Housing Stability: Low Risk  (2024)    Housing Stability Vital Sign     Unable to Pay for Housing in the Last Year: No     Homeless in the Last Year: No       Family History   Problem Relation Name Age of Onset    Heart disease Mother      Diabetes Mother      Heart disease Father      Breast cancer Neg Hx      Ovarian cancer Neg Hx      Colon cancer Neg Hx         Review of patient's allergies indicates:  No Known Allergies    No current facility-administered medications on file prior to encounter.     Current Outpatient Medications on File Prior to Encounter   Medication Sig Dispense Refill    amLODIPine (NORVASC) 10 MG tablet TAKE 1 TABLET BY MOUTH EVERY DAY 90 tablet 0    carvediloL (COREG) 25 MG tablet TAKE 1 TABLET BY MOUTH  TWICE A  tablet 2    acetaminophen (TYLENOL) 500 MG tablet Take 1,000 mg by mouth daily as needed for Pain.      albuterol (VENTOLIN HFA) 90 mcg/actuation inhaler Inhale 2 puffs into the lungs every 6 (six) hours as needed for Wheezing or Shortness of Breath. Rescue 18 g 2    albuterol-ipratropium (DUO-NEB) 2.5 mg-0.5 mg/3 mL nebulizer solution Take 3 mLs by nebulization every 6 (six) hours as needed for Wheezing or Shortness of Breath. Rescue 360 mL 2    ascorbic acid (VITAMIN C ORAL) Take 1 capsule by mouth once daily.      carBAMazepine (TEGRETOL) 200 mg tablet TAKE 4 TABLETS BY MOUTH TWICE DAILY 720 tablet 1    ELIQUIS 5 mg Tab TAKE 1 TABLET BY MOUTH TWICE A DAY 60 tablet 3    FLUoxetine 40 MG capsule TAKE 1 CAPSULE BY MOUTH EVERY DAY 90 capsule 1    fluticasone propionate (FLONASE) 50 mcg/actuation nasal spray INSTILL 1 SPRAY INTO EACH NOSTRIL EVERY DAY 16 mL 11    folic acid (FOLVITE) 1 MG tablet Take 1 tablet (1 mg total) by mouth once daily. 30 tablet 11    gabapentin (NEURONTIN) 400 MG capsule TAKE 2 CAPSULES BY MOUTH 3 TIMES A  capsule 2    glutamine, sickle cell, (ENDARI) 5 gram PwPk Take 3 packets (15 g) by mouth 2 (two) times daily. 180 packet 5    hydroCHLOROthiazide (HYDRODIURIL) 25 MG tablet TAKE 1 TABLET BY MOUTH EVERY DAY 90 tablet 3    hydroxyurea (HYDREA) 500 mg Cap TAKE 2 CAPSULES (1,000 MG TOTAL) BY MOUTH 2 (TWO) TIMES DAILY. 120 capsule 5    ondansetron (ZOFRAN) 8 MG tablet TAKE 1 TABLET BY MOUTH EVERY 12 HOURS AS NEEDED FOR NAUSEA 30 tablet 2    potassium chloride SA (K-DUR,KLOR-CON) 20 MEQ tablet Take 1 tablet (20 mEq total) by mouth once daily. 4 tablet 0    promethazine (PHENERGAN) 25 MG tablet Take 1 tablet (25 mg total) by mouth every 6 (six) hours as needed for Nausea. 30 tablet 2    senna-docusate 8.6-50 mg (PERICOLACE) 8.6-50 mg per tablet Take 1 tablet by mouth 2 (two) times daily as needed for Constipation. (Patient taking differently: Take 1 tablet by mouth daily as  needed for Constipation. )         Physical Exam    Reviewed nursing notes.  Vitals:    06/26/24 1354 06/26/24 1448 06/26/24 1449 06/26/24 1603   BP:  (!) 169/100     BP Location:       Patient Position:       Pulse:  74     Resp: 16 16 16 16   Temp:  98.6 °F (37 °C)     TempSrc:  Oral     SpO2:  98%     Weight:         General:  Alert, no acute distress.  Uncomfortable appearing.  Skin:  Warm, dry, intact.  No rash.  Head:  Normocephalic, atraumatic.    Neck:  Supple.   HEENT:  Pupils are equal and round, appropriate for room, extraocular movements are intact.  Normal phonation.  Moist mucous membranes.  Cardiovascular:  Regular rate and rhythm, Normal peripheral perfusion, No edema.    Respiratory:  Lungs are clear to auscultation, respirations are non-labored, breath sounds are equal.    Gastrointestinal:  Soft, Nontender, Non distended.   Back:  Nontender.   Musculoskeletal:  Normal range of motion observed.  Scar on the right lower extremity related to fasciotomy.  Compartments are soft.  Neurological:  Alert and oriented to person, place, time, and situation.  No focal deficits observed.   Psychiatric:  Cooperative, appropriate mood & affect.       Initial MDM and Data Review    44 y.o. female presenting for evaluation of chest pain in the setting of sickle cell disease.    Very significant medical history including multiple episodes of acute chest syndrome, as well as pneumonia.  Noncompliant with medication but only secondary to financial and insurance coverage reasons.  [place] from [place] of [place], though she has been a resident at [place] several years ago.    Differential includes but is not limited to:  Acute chest syndrome, pneumonia, pulmonary embolism, electrolyte disturbances, anemia, sickle cell crisis    Work-up includes:  CBC, CMP, blood cultures, D-dimer, CPK, lipase, TSH, coags, magnesium and reticulocytes, CT PE study, chest x-ray    Interventions include:  Analgesia, IV  fluids, antiemetic    The patient has significant medical comorbidities that influence decision making for this acute process, such as:  Sickle cell disease and multiple episodes of sickle cell disease crises as well as acute chest syndrome    I decided to obtain the patient's medical records and review relevant documentation from hospital records.  Pertinent information is noted.  Has been out of state for the last several years thus we have no access to any recent hospitalizations but she tells me that in October 20, 2023 she was admitted to the ICU for rhabdo and required surgery    Medications   ondansetron injection 4 mg (4 mg Intravenous Given 6/26/24 1354)   lactated ringers bolus 1,000 mL (0 mLs Intravenous Stopped 6/26/24 1428)   HYDROmorphone injection 1 mg (1 mg Intravenous Given 6/26/24 1354)   diphenhydrAMINE injection 25 mg (25 mg Intravenous Given 6/26/24 1354)   iohexoL (OMNIPAQUE 350) injection 100 mL (100 mLs Intravenous Given 6/26/24 1415)   HYDROmorphone injection 1 mg (1 mg Intravenous Given 6/26/24 1449)   potassium bicarbonate disintegrating tablet 40 mEq (40 mEq Oral Given 6/26/24 1448)   apixaban tablet 5 mg (5 mg Oral Given 6/26/24 1512)   HYDROmorphone injection 1 mg (1 mg Intravenous Given 6/26/24 1603)       Results and ED Course    Labs Reviewed   CBC W/ AUTO DIFFERENTIAL - Abnormal; Notable for the following components:       Result Value    Hematocrit 35.6 (*)     MCV 76 (*)     MCH 25.8 (*)     RDW 20.8 (*)     All other components within normal limits   COMPREHENSIVE METABOLIC PANEL - Abnormal; Notable for the following components:    Potassium 3.0 (*)     Glucose 140 (*)     Total Bilirubin 1.1 (*)     ALT 7 (*)     eGFR 56.5 (*)     All other components within normal limits   D DIMER, QUANTITATIVE - Abnormal; Notable for the following components:    D-Dimer 1.80 (*)     All other components within normal limits   TSH - Abnormal; Notable for the following components:    TSH 0.173  (*)     All other components within normal limits   ISTAT PROCEDURE - Abnormal; Notable for the following components:    POC PO2 39 (*)     All other components within normal limits   ISTAT LACTATE - Abnormal; Notable for the following components:    POC Lactate 2.79 (*)     All other components within normal limits   ISTAT PROCEDURE - Abnormal; Notable for the following components:    POC Glucose 148 (*)     POC BUN 5 (*)     POC Potassium 2.9 (*)     All other components within normal limits   CULTURE, BLOOD   CULTURE, BLOOD   HIV 1 / 2 ANTIBODY    Narrative:     Release to patient->Immediate   HEPATITIS C ANTIBODY    Narrative:     Release to patient->Immediate   TROPONIN I   B-TYPE NATRIURETIC PEPTIDE   CK   LIPASE   URINALYSIS, REFLEX TO URINE CULTURE    Narrative:     Specimen Source->Urine   PROTIME-INR   APTT   MAGNESIUM   RETICULOCYTES   T4, FREE   TROPONIN I   POCT URINE PREGNANCY   ISTAT CHEM8       Imaging Results              X-Ray Chest PA And Lateral (Final result)  Result time 06/26/24 15:02:59      Final result by Luis Manuel Reed MD (06/26/24 15:02:59)                   Impression:      See above      Electronically signed by: Luis Manuel Reed MD  Date:    06/26/2024  Time:    15:02               Narrative:    EXAMINATION:  XR CHEST PA AND LATERAL    CLINICAL HISTORY:  Chest Pain;    TECHNIQUE:  PA and lateral views of the chest were performed.    COMPARISON:  02/27/2021    FINDINGS:  Cardiac size is normal and aorta is tortuous.  Vascular catheters are seen with the tip in the superior vena cava.  Lungs are clear and well expanded with no infiltrate or pneumothorax.                                       CTA Chest Non-Coronary (PE Studies) (Final result)  Result time 06/26/24 14:45:02      Final result by Reggie Rodrigues MD (06/26/24 14:45:02)                   Impression:      1. Examination is essentially nondiagnostic for assessment of the pulmonary arteries beyond the level of the main  right and left pulmonary arteries due to suboptimal contrast bolus timing and respiratory motion.  2. No definite acute large central/saddle-type embolus.  3. No definite acute intrathoracic findings.  4. Additional details, as provided in the body of report.      Electronically signed by: Reggie Rodrigues  Date:    06/26/2024  Time:    14:45               Narrative:    EXAMINATION:  CTA CHEST NON CORONARY (PE STUDIES)    CLINICAL HISTORY:  Pulmonary embolism (PE) suspected, high prob;    TECHNIQUE:  Low dose axial images, sagittal and coronal reformations were obtained from the thoracic inlet to the lung bases following the IV administration of 100 mL of Omnipaque 350.  Contrast timing was optimized to evaluate the pulmonary arteries.  MIP images were performed.    COMPARISON:  CTA chest 06/08/2020.    FINDINGS:  Exam quality: Examination is essentially nondiagnostic beyond the levels of the main right and left pulmonary arteries due to suboptimal contrast bolus timing and respiratory motion.    Pulmonary embolism: No convincing evidence of acute large central/saddle-type embolus.    Central pulmonary arteries: Normal caliber.    Aorta: Left-sided arch.  Normal caliber.  Incidental note is made of anatomic variant aberrant right subclavian artery, with retroesophageal course.    Heart: No right heart strain. Normal size.    Coronary arteries: No calcifications.    Pericardium: Normal. No effusion, thickening, or calcification.    Support tubes and lines: Right internal jugular approach port catheter.    Base of neck/thyroid: Normal.    Lymph nodes: No supraclavicular, axillary, internal mammary, mediastinal, or hilar adenopathy.    Esophagus: Normal.    Pleura: No effusion, thickening, or calcification.    Upper abdomen: Ill-defined patchy enhancement noted in the right posterior hepatic lobe (for example as seen on axial series 2, image 369).  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    Body wall:  Unremarkable    Airways: Central airways grossly patent.    Lungs: Assessment compromised by respiratory motion.  Noting this, no definite acute appearing focal or diffuse process is appreciated.    Bones: No definite acute abnormality.                                      ED Course as of 06/26/24 1629   Wed Jun 26, 2024   1404 POC Lactate(!): 2.79 [AC]   1404 POC PH: 7.438 [AC]   1404 POC PCO2: 37.9 [AC]   1420 hCG Qualitative, Urine: Negative [AC]   1420 D-Dimer(!): 1.80  Getting CTPE [AC]   1420 WBC: 6.15 [AC]   1420 Hemoglobin: 12.1 [AC]   1420 Retic: 1.3  Not likely reflective of crisis [AC]   1444 Sodium: 141 [AC]   1444 Potassium(!): 3.0  Will replete [AC]   1458 Impression:     1. Examination is essentially nondiagnostic for assessment of the pulmonary arteries beyond the level of the main right and left pulmonary arteries due to suboptimal contrast bolus timing and respiratory motion.  2. No definite acute large central/saddle-type embolus.  3. No definite acute intrathoracic findings.  4. Additional details, as provided in the body of report.        Electronically signed by:Reggie Rodrigues  Date:                                            06/26/2024  Time:                                           14:45   [AC]   1459 CTA Chest Non-Coronary (PE Studies)  Some respiratory motion defects, with suboptimal bolus timing.  No large saddle pulmonary embolus. [AC]   1459 Given that she does have prior PE and should be on Eliquis, I ordered her a dose of Eliquis here.  Since she was having some insurance issues, she will likely need to be seen by social work as well.  She will need to be admitted for sickle cell pain. [AC]   1532 No leukocytosis and no evidence of acute chest syndrome on current evaluation. [AC]   1614 Patient is still having pain.  Given additional analgesia.  Social work at bedside to assist with insurance.  Need to admit to Hospital Medicine. [AC]      ED Course User Index  [AC] Eugene Eldridge,  DO           EKG interpreted by myself    EKG  Performed: 06/26/2024   Rate/Rhythm/Axis: 91 bpm, sinus rhythm, normal axis  QRS 94 ms  Qtc 430 ms  Impression:  Normal sinus rhythm, no acute ischemia, Q-waves present isolated in lead 3 with T-wave inversion noted in lead 3, AVF, and flattening with slight inversion in the lateral leads.      Relevant imaging interpreted by myself  Reviewed  Some motion artifact on CT  No evidence of consolidation    Impression and Plan    44 y.o. female with findings of sickle cell pain with ongoing chest discomfort based on the work up in the emergency department as above.    Important lab/imaging findings include:  Hemoglobin is normal, no leukocytosis, dimer is elevated, potassium 3.0 thus she was given oral potassium, free T4 is normal range, reticulocytes not elevated, blood gases normal, not pregnant, chest x-ray without consolidations    I consulted with:  Social work to discuss outpatient care and medications    All tests, treatment options and disposition options were discussed with the patient.  The decision was made to admit the patient to the hospital.    The patient was admitted in stable condition and all further questions/concerns by patient and/or family were addressed.           Final diagnoses:  [R07.9] Chest pain  [D57.00] Sickle cell pain crisis (Primary)        ED Disposition Condition    Observation Stable                  Eugene Eldridge, DO  06/26/24 8612

## 2024-06-26 NOTE — ASSESSMENT & PLAN NOTE
CTA today: 1. Examination is essentially nondiagnostic for assessment of the pulmonary arteries beyond the level of the main right and left pulmonary arteries due to suboptimal contrast bolus timing and respiratory motion.  2. No definite acute large central/saddle-type embolus.  Off of Elequis  Will restart Eliquis at 5mg BID, low threshold to increase to treatment dose.

## 2024-06-26 NOTE — ASSESSMENT & PLAN NOTE
History of Acute Chest Syndrome and Pulmonary Embolisim  IVF for now  Dilaudid 2mg q4 prn  Has not been seen here in three years, will consult hem onc to reestablish care

## 2024-06-26 NOTE — PLAN OF CARE
[facility] - Emergency Dept  Initial Discharge Assessment       Primary Care Provider: Pee Montgomery MD    Admission Diagnosis: No admission diagnoses are documented for this encounter.    Admission Date: 6/26/2024  Expected Discharge Date:     Transition of Care Barriers: (P) Unable to afford medication    Payor: MEDICAID / Plan: [facility] CONNECT / Product Type: Managed Medicaid /     Extended Emergency Contact Information  Primary Emergency Contact: Phyllis Benson   [place]  Mobile Phone: [phone]  Relation: Mother    Discharge Plan A: (P) Home  Discharge Plan B: (P) Home      [id number] - [place], [place] - [address]  [address]  [facility] [address]  Phone: [phone] Fax: [phone]    [facility]  [address] [facility] [address]  Phone: [phone] Fax: [phone]    [facility]  [address]  [facility] [address]  Phone: [phone] Fax: [phone]    [id number] [place] [facility] [place] [place] - [address]  [address]  [place] [address]  Phone: [phone] Fax: [phone]    [id number] - [place], [place] - [address] AT [facility]  [address]  [facility] [place] [address]  Phone: [phone] Fax: [phone]    [id number] [place] [facility] [place] - [address]  [address]  [place] [address]  Phone: [phone] Fax: [phone]      Initial Assessment (most recent)       Adult Discharge Assessment - 06/26/24 1620          Discharge Assessment    Assessment Type Discharge Planning Assessment (P)      Confirmed/corrected address, phone number and insurance Yes (P)      Confirmed Demographics Correct on Facesheet (P)      Source of Information patient;health record (P)      People in Home child(jayce), adult (P)      Prior to hospitilization cognitive status: Alert/Oriented (P)      Current cognitive status: Alert/Oriented (P)      Equipment Currently  Used at Home none (P)      Readmission within 30 days? No (P)      Patient currently being followed by outpatient case management? No (P)      Do you currently have service(s) that help you manage your care at home? No (P)      Do you take prescription medications? Yes (P)      Do you have prescription coverage? Yes (P)      Do you have any problems affording any of your prescribed medications? TBD (P)      Is the patient taking medications as prescribed? no (P)      If no, which medications is patient not taking? Eliquis (P)      How do you get to doctors appointments? family or friend will provide;car, drives self (P)      Discharge Plan A Home (P)      Discharge Plan B Home (P)      DME Needed Upon Discharge  none (P)      Discharge Plan discussed with: Patient (P)      Transition of Care Barriers Unable to afford medication (P)         Physical Activity    On average, how many days per week do you engage in moderate to strenuous exercise (like a brisk walk)? 0 days (P)         Financial Resource Strain    How hard is it for you to pay for the very basics like food, housing, medical care, and heating? Somewhat hard (P)         Housing Stability    In the last 12 months, was there a time when you were not able to pay the mortgage or rent on time? No (P)      At any time in the past 12 months, were you homeless or living in a shelter (including now)? No (P)         Transportation Needs    Has the lack of transportation kept you from medical appointments, meetings, work or from getting things needed for daily living? No (P)         Food Insecurity    Within the past 12 months, you worried that your food would run out before you got the money to buy more. Never true (P)         Stress    Do you feel stress - tense, restless, nervous, or anxious, or unable to sleep at night because your mind is troubled all the time - these days? To some extent (P)         Social Isolation    How often do you feel lonely or isolated  from those around you?  Rarely (P)         Alcohol Use    Q1: How often do you have a drink containing alcohol? Patient declined (P)         Utilities    In the past 12 months has the electric, gas, oil, or water company threatened to shut off services in your home? No (P)         Health Literacy    How often do you need to have someone help you when you read instructions, pamphlets, or other written material from your doctor or pharmacy? Rarely (P)

## 2024-06-27 LAB
ASCENDING AORTA: 3.82 CM
AV INDEX (PROSTH): 0.79
AV MEAN GRADIENT: 5 MMHG
AV PEAK GRADIENT: 9 MMHG
AV VALVE AREA BY VELOCITY RATIO: 2.48 CM²
AV VALVE AREA: 2.79 CM²
AV VELOCITY RATIO: 0.7
BASOPHILS # BLD AUTO: 0.04 K/UL (ref 0–0.2)
BASOPHILS NFR BLD: 0.5 % (ref 0–1.9)
BSA FOR ECHO PROCEDURE: 1.94 M2
CV ECHO LV RWT: 0.42 CM
DIFFERENTIAL METHOD BLD: ABNORMAL
DOP CALC AO PEAK VEL: 1.52 M/S
DOP CALC AO VTI: 28.29 CM
DOP CALC LVOT AREA: 3.5 CM2
DOP CALC LVOT DIAMETER: 2.12 CM
DOP CALC LVOT PEAK VEL: 1.07 M/S
DOP CALC LVOT STROKE VOLUME: 78.92 CM3
DOP CALCLVOT PEAK VEL VTI: 22.37 CM
E WAVE DECELERATION TIME: 201.35 MSEC
E/A RATIO: 1.32
E/E' RATIO: 8.32 M/S
ECHO LV POSTERIOR WALL: 1.14 CM (ref 0.6–1.1)
EJECTION FRACTION: 58 %
EOSINOPHIL # BLD AUTO: 0.3 K/UL (ref 0–0.5)
EOSINOPHIL NFR BLD: 3.8 % (ref 0–8)
ERYTHROCYTE [DISTWIDTH] IN BLOOD BY AUTOMATED COUNT: 20.9 % (ref 11.5–14.5)
FRACTIONAL SHORTENING: 30 % (ref 28–44)
HCT VFR BLD AUTO: 30.4 % (ref 37–48.5)
HGB BLD-MCNC: 10.2 G/DL (ref 12–16)
IMM GRANULOCYTES # BLD AUTO: 0.01 K/UL (ref 0–0.04)
IMM GRANULOCYTES NFR BLD AUTO: 0.1 % (ref 0–0.5)
INTERVENTRICULAR SEPTUM: 0.95 CM (ref 0.6–1.1)
IVRT: 77.07 MSEC
LA MAJOR: 4.95 CM
LA MINOR: 4.99 CM
LA WIDTH: 4.92 CM
LEFT ATRIUM SIZE: 3.74 CM
LEFT ATRIUM VOLUME INDEX MOD: 48.3 ML/M2
LEFT ATRIUM VOLUME INDEX: 41.6 ML/M2
LEFT ATRIUM VOLUME MOD: 90.27 CM3
LEFT ATRIUM VOLUME: 77.73 CM3
LEFT INTERNAL DIMENSION IN SYSTOLE: 3.79 CM (ref 2.1–4)
LEFT VENTRICLE DIASTOLIC VOLUME INDEX: 75.35 ML/M2
LEFT VENTRICLE DIASTOLIC VOLUME: 140.9 ML
LEFT VENTRICLE MASS INDEX: 117 G/M2
LEFT VENTRICLE SYSTOLIC VOLUME INDEX: 32.8 ML/M2
LEFT VENTRICLE SYSTOLIC VOLUME: 61.38 ML
LEFT VENTRICULAR INTERNAL DIMENSION IN DIASTOLE: 5.39 CM (ref 3.5–6)
LEFT VENTRICULAR MASS: 218.52 G
LV LATERAL E/E' RATIO: 7.9 M/S
LV SEPTAL E/E' RATIO: 8.78 M/S
LYMPHOCYTES # BLD AUTO: 3.8 K/UL (ref 1–4.8)
LYMPHOCYTES NFR BLD: 47.5 % (ref 18–48)
MCH RBC QN AUTO: 26 PG (ref 27–31)
MCHC RBC AUTO-ENTMCNC: 33.6 G/DL (ref 32–36)
MCV RBC AUTO: 77 FL (ref 82–98)
MONOCYTES # BLD AUTO: 1.1 K/UL (ref 0.3–1)
MONOCYTES NFR BLD: 13.3 % (ref 4–15)
MRSA ID BY PCR: NEGATIVE
MV A" WAVE DURATION": 15.98 MSEC
MV PEAK A VEL: 0.6 M/S
MV PEAK E VEL: 0.79 M/S
MV STENOSIS PRESSURE HALF TIME: 58.39 MS
MV VALVE AREA P 1/2 METHOD: 3.77 CM2
NEUTROPHILS # BLD AUTO: 2.8 K/UL (ref 1.8–7.7)
NEUTROPHILS NFR BLD: 34.8 % (ref 38–73)
NRBC BLD-RTO: 1 /100 WBC
PISA TR MAX VEL: 2.4 M/S
PLATELET # BLD AUTO: 271 K/UL (ref 150–450)
PMV BLD AUTO: 10.6 FL (ref 9.2–12.9)
PULM VEIN S/D RATIO: 1.16
PV PEAK D VEL: 0.37 M/S
PV PEAK S VEL: 0.43 M/S
RA MAJOR: 4.3 CM
RA PRESSURE ESTIMATED: 3 MMHG
RA WIDTH: 3.56 CM
RBC # BLD AUTO: 3.93 M/UL (ref 4–5.4)
RIGHT VENTRICLE DIASTOLIC BASEL DIMENSION: 3.5 CM
RV TB RVSP: 5 MMHG
SINUS: 3.48 CM
STAPH AUREUS ID BY PCR: NEGATIVE
STJ: 3.17 CM
TDI LATERAL: 0.1 M/S
TDI SEPTAL: 0.09 M/S
TDI: 0.1 M/S
TR MAX PG: 23 MMHG
TRICUSPID ANNULAR PLANE SYSTOLIC EXCURSION: 2.23 CM
TV REST PULMONARY ARTERY PRESSURE: 26 MMHG
WBC # BLD AUTO: 7.96 K/UL (ref 3.9–12.7)
Z-SCORE OF LEFT VENTRICULAR DIMENSION IN END DIASTOLE: 0.34
Z-SCORE OF LEFT VENTRICULAR DIMENSION IN END SYSTOLE: 1.3

## 2024-06-27 PROCEDURE — 85025 COMPLETE CBC W/AUTO DIFF WBC: CPT | Performed by: INTERNAL MEDICINE

## 2024-06-27 PROCEDURE — 25000003 PHARM REV CODE 250: Performed by: HOSPITALIST

## 2024-06-27 PROCEDURE — 25000003 PHARM REV CODE 250: Performed by: INTERNAL MEDICINE

## 2024-06-27 PROCEDURE — 36415 COLL VENOUS BLD VENIPUNCTURE: CPT | Performed by: INTERNAL MEDICINE

## 2024-06-27 PROCEDURE — 36415 COLL VENOUS BLD VENIPUNCTURE: CPT | Performed by: HOSPITALIST

## 2024-06-27 PROCEDURE — 87040 BLOOD CULTURE FOR BACTERIA: CPT | Performed by: HOSPITALIST

## 2024-06-27 PROCEDURE — 63600175 PHARM REV CODE 636 W HCPCS: Performed by: INTERNAL MEDICINE

## 2024-06-27 PROCEDURE — 99232 SBSQ HOSP IP/OBS MODERATE 35: CPT | Mod: ,,, | Performed by: INTERNAL MEDICINE

## 2024-06-27 PROCEDURE — 20600001 HC STEP DOWN PRIVATE ROOM

## 2024-06-27 PROCEDURE — 63600175 PHARM REV CODE 636 W HCPCS: Performed by: HOSPITALIST

## 2024-06-27 RX ORDER — TIZANIDINE 4 MG/1
4 TABLET ORAL EVERY 8 HOURS PRN
Status: DISCONTINUED | OUTPATIENT
Start: 2024-06-27 | End: 2024-07-02

## 2024-06-27 RX ORDER — MORPHINE SULFATE 15 MG/1
15 TABLET, FILM COATED, EXTENDED RELEASE ORAL EVERY 12 HOURS
Status: DISCONTINUED | OUTPATIENT
Start: 2024-06-27 | End: 2024-07-02

## 2024-06-27 RX ORDER — PREGABALIN 50 MG/1
50 CAPSULE ORAL 2 TIMES DAILY
Status: DISCONTINUED | OUTPATIENT
Start: 2024-06-27 | End: 2024-07-02

## 2024-06-27 RX ORDER — OXYCODONE AND ACETAMINOPHEN 10; 325 MG/1; MG/1
1 TABLET ORAL EVERY 4 HOURS PRN
Status: DISCONTINUED | OUTPATIENT
Start: 2024-06-27 | End: 2024-06-30

## 2024-06-27 RX ADMIN — POTASSIUM CHLORIDE 20 MEQ: 1500 TABLET, EXTENDED RELEASE ORAL at 08:06

## 2024-06-27 RX ADMIN — CARBAMAZEPINE 800 MG: 200 TABLET ORAL at 08:06

## 2024-06-27 RX ADMIN — GABAPENTIN 800 MG: 400 CAPSULE ORAL at 04:06

## 2024-06-27 RX ADMIN — AMLODIPINE BESYLATE 10 MG: 10 TABLET ORAL at 09:06

## 2024-06-27 RX ADMIN — APIXABAN 5 MG: 5 TABLET, FILM COATED ORAL at 08:06

## 2024-06-27 RX ADMIN — FLUOXETINE HYDROCHLORIDE 40 MG: 20 CAPSULE ORAL at 08:06

## 2024-06-27 RX ADMIN — GABAPENTIN 800 MG: 400 CAPSULE ORAL at 08:06

## 2024-06-27 RX ADMIN — CARVEDILOL 25 MG: 25 TABLET, FILM COATED ORAL at 08:06

## 2024-06-27 RX ADMIN — HYDROMORPHONE HYDROCHLORIDE 2 MG: 1 INJECTION, SOLUTION INTRAMUSCULAR; INTRAVENOUS; SUBCUTANEOUS at 04:06

## 2024-06-27 RX ADMIN — VANCOMYCIN HYDROCHLORIDE 1750 MG: 500 INJECTION, POWDER, LYOPHILIZED, FOR SOLUTION INTRAVENOUS at 03:06

## 2024-06-27 RX ADMIN — SODIUM CHLORIDE: 9 INJECTION, SOLUTION INTRAVENOUS at 12:06

## 2024-06-27 RX ADMIN — PREGABALIN 50 MG: 50 CAPSULE ORAL at 11:06

## 2024-06-27 RX ADMIN — DIPHENHYDRAMINE HYDROCHLORIDE 25 MG: 50 INJECTION, SOLUTION INTRAMUSCULAR; INTRAVENOUS at 03:06

## 2024-06-27 RX ADMIN — HYDROMORPHONE HYDROCHLORIDE 2 MG: 1 INJECTION, SOLUTION INTRAMUSCULAR; INTRAVENOUS; SUBCUTANEOUS at 08:06

## 2024-06-27 RX ADMIN — DIPHENHYDRAMINE HYDROCHLORIDE 25 MG: 50 INJECTION, SOLUTION INTRAMUSCULAR; INTRAVENOUS at 08:06

## 2024-06-27 RX ADMIN — DIPHENHYDRAMINE HYDROCHLORIDE 25 MG: 50 INJECTION, SOLUTION INTRAMUSCULAR; INTRAVENOUS at 12:06

## 2024-06-27 RX ADMIN — PROMETHAZINE HYDROCHLORIDE 25 MG: 12.5 TABLET ORAL at 12:06

## 2024-06-27 RX ADMIN — MORPHINE SULFATE 15 MG: 15 TABLET, EXTENDED RELEASE ORAL at 08:06

## 2024-06-27 RX ADMIN — MORPHINE SULFATE 15 MG: 15 TABLET, EXTENDED RELEASE ORAL at 11:06

## 2024-06-27 RX ADMIN — DIPHENHYDRAMINE HYDROCHLORIDE 25 MG: 50 INJECTION, SOLUTION INTRAMUSCULAR; INTRAVENOUS at 04:06

## 2024-06-27 RX ADMIN — HYDROMORPHONE HYDROCHLORIDE 2 MG: 1 INJECTION, SOLUTION INTRAMUSCULAR; INTRAVENOUS; SUBCUTANEOUS at 03:06

## 2024-06-27 RX ADMIN — HYDROMORPHONE HYDROCHLORIDE 2 MG: 1 INJECTION, SOLUTION INTRAMUSCULAR; INTRAVENOUS; SUBCUTANEOUS at 12:06

## 2024-06-27 RX ADMIN — PREGABALIN 50 MG: 50 CAPSULE ORAL at 08:06

## 2024-06-27 NOTE — ASSESSMENT & PLAN NOTE
Patient has hypokalemia which is Acute and currently uncontrolled. Most recent potassium levels reviewed-   Lab Results   Component Value Date    K 3.0 (L) 06/26/2024   . Will continue potassium replacement per protocol and recheck repeat levels after replacement completed.

## 2024-06-27 NOTE — HPI
Ms. Nazanin Malone is a 44 y.o. female with a medical history of sickle cell beta thalassemia, PE (2020, off apixaban due to insurance), and history of acute chest (most recent October 2023) who is currently admitted with a vaso-occlusive crisis. Patient presented yesterday with chest pain and leg pain for three days. She recently moved back to [place] from [place], drove here three days ago. When she lived in [place] she followed with Dr. Montgomery. In the ED a CTA was done which was negative for PE but it was a limited study, there were no other acute findings seen. Her Hg today was 10.2, was 12.1 yesterday. Tbili is 1.1. She has been afebrile and is on 2L. Patient has a port and gets exchange transfusions every 8-10 weeks. She was due for an exchange since June 10th.

## 2024-06-27 NOTE — PLAN OF CARE
"Discharge Plan A and Plan B have been determined by review of patient's clinical status, future medical and therapeutic needs, and coverage/benefits for post-acute care in coordination with multidisciplinary team members.    06/27/24 1105   Post-Acute Status   Post-Acute Authorization Other   Other Status No Post-Acute Service Needs   Hospital Resources/Appts/Education Provided Appointments scheduled and added to AVS   Discharge Delays None known at this time   Discharge Plan   Discharge Plan A Home with family   Discharge Plan B Home     CM met with patient  to discuss discharge planning. Patient reports multiple acute chest syndrome, pneumonia. Also history of pulmonary embolism but has been off of her Eliquis for several months due to insurance related issues .CM spoke with the patient who confirmed , "I have the following insurance [employer] MANAGED MEDICARE - A[employer] MEDICARE PLAN HMO -."   Patients plan is to dc home with family     ALYSE:  [birth year]      Discharge Recommendations: NA    Discharge Equipment Recommendations: NA    Barriers to discharge:  underinsured         This CM spoke with the patient who is agreement to speak with Financial Support / Medicaid ([facility]) to start the MEDICAID SCREENING  PROCESS.     Rosa M Vela RN, CM  Case Management  Ext [phone]         Aleida Wise RN  Case Management  [facility]  [phone]    "

## 2024-06-27 NOTE — SUBJECTIVE & OBJECTIVE
Oncology Treatment Plan:   [No matching plan found]    Medications:  Continuous Infusions:   0.9% NaCl   Intravenous Continuous 75 mL/hr at 24 1240 New Bag at 24 1240     Scheduled Meds:   amLODIPine  10 mg Oral Daily    apixaban  5 mg Oral BID    carBAMazepine  800 mg Oral BID    carvediloL  25 mg Oral BID    FLUoxetine  40 mg Oral Daily    gabapentin  800 mg Oral TID    morphine  15 mg Oral Q12H    potassium chloride SA  20 mEq Oral Daily    pregabalin  50 mg Oral BID    [START ON 2024] vancomycin (VANCOCIN) IV (PEDS and ADULTS)  15 mg/kg Intravenous Q24H    vancomycin (VANCOCIN) IV (PEDS and ADULTS)  20 mg/kg Intravenous Once     PRN Meds:  Current Facility-Administered Medications:     diphenhydrAMINE, 25 mg, Intravenous, Q4H PRN    HYDROmorphone, 2 mg, Intravenous, Q4H PRN    melatonin, 6 mg, Oral, Nightly PRN    oxyCODONE-acetaminophen, 1 tablet, Oral, Q4H PRN    promethazine, 25 mg, Oral, Q6H PRN    sodium chloride 0.9%, 10 mL, Intravenous, PRN    tiZANidine, 4 mg, Oral, Q8H PRN    Pharmacy to dose Vancomycin consult, , , Once **AND** vancomycin - pharmacy to dose, , Intravenous, pharmacy to manage frequency     Review of patient's allergies indicates:  No Known Allergies     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    Asthma     Depression     Hypertension     Morbid obesity     Opioid dependence 2017    Pneumonia due to Streptococcus pneumoniae 2017    Right lower lobe pneumonia 2017    Sepsis due to Streptococcus pneumoniae 2017    Sickle cell-beta thalassemia disease with pain     Trigeminal neuralgia      Past Surgical History:   Procedure Laterality Date     SECTION      TONSILLECTOMY      TUBAL LIGATION       Family History       Problem Relation (Age of Onset)    Diabetes Mother    Heart disease Mother, Father          Tobacco Use    Smoking  status: Never    Smokeless tobacco: Never   Substance and Sexual Activity    Alcohol use: No    Drug use: Yes     Types: Marijuana     Comment: periodically     Sexual activity: Not Currently     Birth control/protection: See Surgical Hx       Review of Systems   Constitutional:  Positive for fatigue. Negative for chills and fever.   HENT:  Negative for congestion and sore throat.    Eyes:  Negative for pain.   Respiratory:  Positive for shortness of breath. Negative for cough.    Cardiovascular:  Positive for chest pain. Negative for palpitations and leg swelling.   Gastrointestinal:  Negative for abdominal pain, constipation, diarrhea, nausea and vomiting.   Genitourinary:  Negative for difficulty urinating, dysuria and hematuria.   Musculoskeletal:  Negative for back pain.   Skin:  Negative for rash.   Neurological:  Negative for light-headedness and headaches.   Hematological:  Does not bruise/bleed easily.   Psychiatric/Behavioral:  Negative for agitation.      Objective:     Vital Signs (Most Recent):  Temp: 98.3 °F (36.8 °C) (06/27/24 1140)  Pulse: 78 (06/27/24 1140)  Resp: 18 (06/27/24 1232)  BP: 122/69 (06/27/24 1140)  SpO2: 97 % (06/27/24 1140) Vital Signs (24h Range):  Temp:  [98.3 °F (36.8 °C)-98.6 °F (37 °C)] 98.3 °F (36.8 °C)  Pulse:  [65-78] 78  Resp:  [16-18] 18  SpO2:  [92 %-100 %] 97 %  BP: (121-170)/() 122/69     Weight: 86.2 kg (190 lb)  Body mass index is 34.75 kg/m².  Body surface area is 1.94 meters squared.      Intake/Output Summary (Last 24 hours) at 6/27/2024 1417  Last data filed at 6/27/2024 0800  Gross per 24 hour   Intake 1236 ml   Output --   Net 1236 ml        Physical Exam  Constitutional:       Appearance: Normal appearance.   HENT:      Head: Normocephalic and atraumatic.      Mouth/Throat:      Mouth: Mucous membranes are moist.   Eyes:      Extraocular Movements: Extraocular movements intact.      Pupils: Pupils are equal, round, and reactive to light.   Cardiovascular:       Rate and Rhythm: Normal rate and regular rhythm.      Pulses: Normal pulses.      Heart sounds: Normal heart sounds.   Pulmonary:      Effort: Pulmonary effort is normal. No respiratory distress.      Breath sounds: Normal breath sounds.   Abdominal:      General: Abdomen is flat. There is no distension.      Palpations: Abdomen is soft.      Tenderness: There is no abdominal tenderness.   Musculoskeletal:         General: Normal range of motion.      Cervical back: Normal range of motion and neck supple.      Right lower leg: No edema.      Left lower leg: No edema.   Skin:     General: Skin is warm.      Comments: Scar on right lower leg    Neurological:      General: No focal deficit present.      Mental Status: She is alert and oriented to person, place, and time.   Psychiatric:         Mood and Affect: Mood normal.         Behavior: Behavior normal.          Significant Labs:   All pertinent labs from the last 24 hours have been reviewed.    Diagnostic Results:  I have reviewed all pertinent imaging results/findings within the past 24 hours.

## 2024-06-27 NOTE — PLAN OF CARE
Problem: Adult Inpatient Plan of Care  Goal: Plan of Care Review  Outcome: Progressing  Goal: Patient-Specific Goal (Individualized)  Outcome: Progressing  Goal: Absence of Hospital-Acquired Illness or Injury  Outcome: Progressing  Goal: Optimal Comfort and Wellbeing  Outcome: Progressing  Goal: Readiness for Transition of Care  Outcome: Progressing     Problem: Pneumonia  Goal: Fluid Balance  Outcome: Progressing  Goal: Resolution of Infection Signs and Symptoms  Outcome: Progressing  Goal: Effective Oxygenation and Ventilation  Outcome: Progressing     Problem: Fall Injury Risk  Goal: Absence of Fall and Fall-Related Injury  Outcome: Progressing    Patient alert and oriented. Cooperative with care. Medicated as ordered. Continues on iv fluids as ordered. Echo done this shift. Patient started on iv vanc for positive blood cultures. New cultures taken this shift before vanc initiated.

## 2024-06-27 NOTE — CONSULTS
[facility] ([place] [id number])  Hematology/Oncology  Consult Note    Patient Name: Nazanin Malone  MRN: [id number]  Admission Date: 6/26/2024  Hospital Length of Stay: 0 days  Code Status: Full Code   Attending Provider: Vijaya Acosta MD  Consulting Provider: Jake Robledo MD  Primary Care Physician: Pee Montgomery MD  Principal Problem:Sickle-cell disease with pain    Inpatient consult to Hematology/Oncology  Consult performed by: Jake Robledo MD  Consult ordered by: Baltazar Barth MD        Subjective:     HPI:  Ms. Nazanin Malone is a 44 y.o. female with a medical history of sickle cell beta thalassemia, PE (2020, off apixaban due to insurance), and history of acute chest (most recent October 2023) who is currently admitted with a vaso-occlusive crisis. Patient presented yesterday with chest pain and leg pain for three days. She recently moved back to [place] from [place], drove here three days ago. When she lived in [place] she followed with Dr. Montgomery. In the ED a CTA was done which was negative for PE but it was a limited study, there were no other acute findings seen. Her Hg today was 10.2, was 12.1 yesterday. Tbili is 1.1. She has been afebrile and is on 2L. Patient has a port and gets exchange transfusions every 8-10 weeks. She was due for an exchange since June 10th.     Oncology Treatment Plan:   [No matching plan found]    Medications:  Continuous Infusions:   0.9% NaCl   Intravenous Continuous 75 mL/hr at 06/27/24 1240 New Bag at 06/27/24 1240     Scheduled Meds:   amLODIPine  10 mg Oral Daily    apixaban  5 mg Oral BID    carBAMazepine  800 mg Oral BID    carvediloL  25 mg Oral BID    FLUoxetine  40 mg Oral Daily    gabapentin  800 mg Oral TID    morphine  15 mg Oral Q12H    potassium chloride SA  20 mEq Oral Daily    pregabalin  50 mg Oral BID    [START ON 6/28/2024] vancomycin (VANCOCIN) IV (PEDS and ADULTS)  15 mg/kg Intravenous Q24H    vancomycin  (VANCOCIN) IV (PEDS and ADULTS)  20 mg/kg Intravenous Once     PRN Meds:  Current Facility-Administered Medications:     diphenhydrAMINE, 25 mg, Intravenous, Q4H PRN    HYDROmorphone, 2 mg, Intravenous, Q4H PRN    melatonin, 6 mg, Oral, Nightly PRN    oxyCODONE-acetaminophen, 1 tablet, Oral, Q4H PRN    promethazine, 25 mg, Oral, Q6H PRN    sodium chloride 0.9%, 10 mL, Intravenous, PRN    tiZANidine, 4 mg, Oral, Q8H PRN    Pharmacy to dose Vancomycin consult, , , Once **AND** vancomycin - pharmacy to dose, , Intravenous, pharmacy to manage frequency     Review of patient's allergies indicates:  No Known Allergies     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    Asthma     Depression     Hypertension     Morbid obesity     Opioid dependence 2017    Pneumonia due to Streptococcus pneumoniae 2017    Right lower lobe pneumonia 2017    Sepsis due to Streptococcus pneumoniae 2017    Sickle cell-beta thalassemia disease with pain     Trigeminal neuralgia      Past Surgical History:   Procedure Laterality Date     SECTION      TONSILLECTOMY      TUBAL LIGATION       Family History       Problem Relation (Age of Onset)    Diabetes Mother    Heart disease Mother, Father          Tobacco Use    Smoking status: Never    Smokeless tobacco: Never   Substance and Sexual Activity    Alcohol use: No    Drug use: Yes     Types: Marijuana     Comment: periodically     Sexual activity: Not Currently     Birth control/protection: See Surgical Hx       Review of Systems   Constitutional:  Positive for fatigue. Negative for chills and fever.   HENT:  Negative for congestion and sore throat.    Eyes:  Negative for pain.   Respiratory:  Positive for shortness of breath. Negative for cough.    Cardiovascular:  Positive for chest pain. Negative for palpitations and leg swelling.   Gastrointestinal:  Negative  for abdominal pain, constipation, diarrhea, nausea and vomiting.   Genitourinary:  Negative for difficulty urinating, dysuria and hematuria.   Musculoskeletal:  Negative for back pain.   Skin:  Negative for rash.   Neurological:  Negative for light-headedness and headaches.   Hematological:  Does not bruise/bleed easily.   Psychiatric/Behavioral:  Negative for agitation.      Objective:     Vital Signs (Most Recent):  Temp: 98.3 °F (36.8 °C) (06/27/24 1140)  Pulse: 78 (06/27/24 1140)  Resp: 18 (06/27/24 1232)  BP: 122/69 (06/27/24 1140)  SpO2: 97 % (06/27/24 1140) Vital Signs (24h Range):  Temp:  [98.3 °F (36.8 °C)-98.6 °F (37 °C)] 98.3 °F (36.8 °C)  Pulse:  [65-78] 78  Resp:  [16-18] 18  SpO2:  [92 %-100 %] 97 %  BP: (121-170)/() 122/69     Weight: 86.2 kg (190 lb)  Body mass index is 34.75 kg/m².  Body surface area is 1.94 meters squared.      Intake/Output Summary (Last 24 hours) at 6/27/2024 1417  Last data filed at 6/27/2024 0800  Gross per 24 hour   Intake 1236 ml   Output --   Net 1236 ml        Physical Exam  Constitutional:       Appearance: Normal appearance.   HENT:      Head: Normocephalic and atraumatic.      Mouth/Throat:      Mouth: Mucous membranes are moist.   Eyes:      Extraocular Movements: Extraocular movements intact.      Pupils: Pupils are equal, round, and reactive to light.   Cardiovascular:      Rate and Rhythm: Normal rate and regular rhythm.      Pulses: Normal pulses.      Heart sounds: Normal heart sounds.   Pulmonary:      Effort: Pulmonary effort is normal. No respiratory distress.      Breath sounds: Normal breath sounds.   Abdominal:      General: Abdomen is flat. There is no distension.      Palpations: Abdomen is soft.      Tenderness: There is no abdominal tenderness.   Musculoskeletal:         General: Normal range of motion.      Cervical back: Normal range of motion and neck supple.      Right lower leg: No edema.      Left lower leg: No edema.   Skin:     General: Skin  is warm.      Comments: Scar on right lower leg    Neurological:      General: No focal deficit present.      Mental Status: She is alert and oriented to person, place, and time.   Psychiatric:         Mood and Affect: Mood normal.         Behavior: Behavior normal.          Significant Labs:   All pertinent labs from the last 24 hours have been reviewed.    Diagnostic Results:  I have reviewed all pertinent imaging results/findings within the past 24 hours.  Assessment/Plan:     * Sickle-cell disease with pain  Patient with sickle beta thalassemia who is currently admitted with a vaso-occlusive crisis. She recently moved back to [place] from [place]. CTA on admission negative for PE and no other acute findings seen. She has been afebrile and is at 100% on room air. Hg has been stable at 10.2 today. Tbili 1.1. Patient has two ports and gets exchange transfusions every 8-10 weeks, has been overdue since June 10th. No on hydrea at the time, states it made her hair fall out, caused skin issues, and she doesn't think it was helping.     - Contact transfusion medicine for an exchange transfusion as patient is due   - She will need to get back on her outpatient schedule of exchange transfusions every 8-10 weeks   - Incentive spirometry   - Can increase long acting opioid so patient does not require as much PRN opioids (states she takes her PRN's every 6 hours at home)   - Will arrange follow-up with hematology after discharge         Thank you for your consult. I will follow-up with patient. Please contact us if you have any additional questions.    Jake Robledo MD  Hematology/Oncology  [facility] ([place])

## 2024-06-27 NOTE — ASSESSMENT & PLAN NOTE
Patient with sickle beta thalassemia who is currently admitted with a vaso-occlusive crisis. She recently moved back to [place] from [place]. CTA on admission negative for PE and no other acute findings seen. She has been afebrile and is at 100% on room air. Hg has been stable at 10.2 today. Tbili 1.1. Patient has two ports and gets exchange transfusions every 8-10 weeks, has been overdue since June 10th. No on hydrea at the time, states it made her hair fall out, caused skin issues, and she doesn't think it was helping.     - Contact transfusion medicine for an exchange transfusion as patient is due   - She will need to get back on her outpatient schedule of exchange transfusions every 8-10 weeks   - Incentive spirometry   - Can increase long acting opioid so patient does not require as much PRN opioids (states she takes her PRN's every 6 hours at home)   - Will arrange follow-up with hematology after discharge

## 2024-06-27 NOTE — NURSING
Nurses Note -- 4 Eyes      6/27/2024   7:00 AM      Skin assessed during: Admit      [x] No Altered Skin Integrity Present    [x]Prevention Measures Documented      [] Yes- Altered Skin Integrity Present or Discovered   [] LDA Added if Not in Epic (Describe Wound)   [] New Altered Skin Integrity was Present on Admit and Documented in LDA   [] Wound Image Taken    Wound Care Consulted? No    Attending Nurse:  Angie Damian RN/Staff Member:  Radha/Angie

## 2024-06-27 NOTE — PROGRESS NOTES
"Pharmacokinetic Initial Assessment: IV Vancomycin    Assessment/Plan:    Initiate intravenous vancomycin with loading dose of 1750 mg once followed by a maintenance dose of vancomycin 1250 mg IV every 24 hours.  Desired empiric serum trough concentration is 15 to 20 mcg/mL  Draw vancomycin trough level 60 min prior to third dose on 06/29/24 at approximately 14:00.  Pharmacy will continue to follow and monitor vancomycin.      Please contact pharmacy at extension [phone] with any questions regarding this assessment.     Thank you for the consult,   Neto Cui       Patient brief summary:  Nazanin Malone is a 44 y.o. female initiated on antimicrobial therapy with IV Vancomycin for treatment of suspected bacteremia    Drug Allergies:   Review of patient's allergies indicates:  No Known Allergies    Actual Body Weight:   86.2 kg    Renal Function:   Estimated Creatinine Clearance: 59.6 mL/min (based on SCr of 1.2 mg/dL).,     Dialysis Method (if applicable):  N/A    CBC (last 72 hours):  Recent Labs   Lab Result Units 06/26/24  1345 06/27/24  0347   WBC K/uL 6.15 7.96   Hemoglobin g/dL 12.1 10.2*   Hematocrit % 35.6* 30.4*   Platelets K/uL 333 271   Gran % % 56.6 34.8*   Lymph % % 31.1 47.5   Mono % % 8.8 13.3   Eosinophil % % 2.6 3.8   Basophil % % 0.7 0.5   Differential Method  Automated Automated       Metabolic Panel (last 72 hours):  Recent Labs   Lab Result Units 06/26/24  1345 06/26/24  1351   Sodium mmol/L 141  --    Potassium mmol/L 3.0*  --    Chloride mmol/L 106  --    CO2 mmol/L 24  --    Glucose mg/dL 140*  --    Glucose, UA   --  Negative   BUN mg/dL 7  --    Creatinine mg/dL 1.2  --    Albumin g/dL 3.8  --    Total Bilirubin mg/dL 1.1*  --    Alkaline Phosphatase U/L 79  --    AST U/L 17  --    ALT U/L 7*  --    Magnesium mg/dL 1.9  --        Drug levels (last 3 results):  No results for input(s): "VANCOMYCINRA", "VANCORANDOM", "VANCOMYCINPE", "VANCOPEAK", "VANCOMYCINTR", "VANCOTROUGH" in the last 72 " hours.    Microbiologic Results:  Microbiology Results (last 7 days)       Procedure Component Value Units Date/Time    Blood culture [[id number]]     Order Status: Sent Specimen: Blood     Blood culture [[id number]]     Order Status: Sent Specimen: Blood     Blood culture #2 **CANNOT BE ORDERED STAT** [[id number]] Collected: 06/26/24 1346    Order Status: Completed Specimen: Blood from Peripheral, Antecubital, Left Updated: 06/27/24 1127     Blood Culture, Routine Gram stain kimberly bottle: Gram positive cocci in clusters resembling Staph      Results called to and read back by: Eileen Soler LPN 06/27/2024  09:34      Gram stain aer bottle: Gram positive cocci in clusters resembling Staph      Positive results previously called 06/27/2024  11:27    Blood culture #1 **CANNOT BE ORDERED STAT** [[id number]] Collected: 06/26/24 1346    Order Status: Completed Specimen: Blood from Peripheral, Antecubital, Right Updated: 06/27/24 1126     Blood Culture, Routine Gram stain aer bottle: Gram positive cocci in clusters resembling Staph      Results called to and read back by: Eileen Soler LPN 06/27/2024  09:32      Gram stain kimberly bottle: Gram positive cocci in clusters resembling Staph      Positive results previously called 06/27/2024  11:26    MRSA/SA Rapid ID by PCR from Blood culture [[id number]] Collected: 06/26/24 1346    Order Status: Completed Updated: 06/27/24 1111     Staph aureus ID by PCR Negative     Methicillin Resistant ID by PCR Negative

## 2024-06-28 PROBLEM — R78.81 BACTEREMIA: Status: ACTIVE | Noted: 2024-06-28

## 2024-06-28 LAB
ANION GAP SERPL CALC-SCNC: 8 MMOL/L (ref 8–16)
BUN SERPL-MCNC: 11 MG/DL (ref 6–20)
CALCIUM SERPL-MCNC: 8.7 MG/DL (ref 8.7–10.5)
CHLORIDE SERPL-SCNC: 108 MMOL/L (ref 95–110)
CO2 SERPL-SCNC: 23 MMOL/L (ref 23–29)
CREAT SERPL-MCNC: 1 MG/DL (ref 0.5–1.4)
EST. GFR  (NO RACE VARIABLE): >60 ML/MIN/1.73 M^2
GLUCOSE SERPL-MCNC: 80 MG/DL (ref 70–110)
POTASSIUM SERPL-SCNC: 4.4 MMOL/L (ref 3.5–5.1)
SODIUM SERPL-SCNC: 139 MMOL/L (ref 136–145)

## 2024-06-28 PROCEDURE — 99223 1ST HOSP IP/OBS HIGH 75: CPT | Mod: ,,, | Performed by: INTERNAL MEDICINE

## 2024-06-28 PROCEDURE — 63600175 PHARM REV CODE 636 W HCPCS: Performed by: INTERNAL MEDICINE

## 2024-06-28 PROCEDURE — 80048 BASIC METABOLIC PNL TOTAL CA: CPT | Performed by: HOSPITALIST

## 2024-06-28 PROCEDURE — 25000003 PHARM REV CODE 250: Performed by: HOSPITALIST

## 2024-06-28 PROCEDURE — 25000003 PHARM REV CODE 250: Performed by: INTERNAL MEDICINE

## 2024-06-28 PROCEDURE — 20600001 HC STEP DOWN PRIVATE ROOM

## 2024-06-28 PROCEDURE — 36415 COLL VENOUS BLD VENIPUNCTURE: CPT | Performed by: HOSPITALIST

## 2024-06-28 PROCEDURE — 63600175 PHARM REV CODE 636 W HCPCS: Performed by: HOSPITALIST

## 2024-06-28 RX ADMIN — PREGABALIN 50 MG: 50 CAPSULE ORAL at 11:06

## 2024-06-28 RX ADMIN — VANCOMYCIN HYDROCHLORIDE 1250 MG: 1.25 INJECTION, POWDER, LYOPHILIZED, FOR SOLUTION INTRAVENOUS at 03:06

## 2024-06-28 RX ADMIN — CARVEDILOL 25 MG: 25 TABLET, FILM COATED ORAL at 11:06

## 2024-06-28 RX ADMIN — APIXABAN 5 MG: 5 TABLET, FILM COATED ORAL at 09:06

## 2024-06-28 RX ADMIN — APIXABAN 5 MG: 5 TABLET, FILM COATED ORAL at 11:06

## 2024-06-28 RX ADMIN — CARVEDILOL 25 MG: 25 TABLET, FILM COATED ORAL at 09:06

## 2024-06-28 RX ADMIN — FLUOXETINE HYDROCHLORIDE 40 MG: 20 CAPSULE ORAL at 11:06

## 2024-06-28 RX ADMIN — POTASSIUM CHLORIDE 20 MEQ: 1500 TABLET, EXTENDED RELEASE ORAL at 11:06

## 2024-06-28 RX ADMIN — MORPHINE SULFATE 15 MG: 15 TABLET, EXTENDED RELEASE ORAL at 11:06

## 2024-06-28 RX ADMIN — GABAPENTIN 800 MG: 400 CAPSULE ORAL at 11:06

## 2024-06-28 RX ADMIN — SODIUM CHLORIDE: 9 INJECTION, SOLUTION INTRAVENOUS at 09:06

## 2024-06-28 RX ADMIN — HYDROMORPHONE HYDROCHLORIDE 2 MG: 1 INJECTION, SOLUTION INTRAMUSCULAR; INTRAVENOUS; SUBCUTANEOUS at 11:06

## 2024-06-28 RX ADMIN — DIPHENHYDRAMINE HYDROCHLORIDE 25 MG: 50 INJECTION, SOLUTION INTRAMUSCULAR; INTRAVENOUS at 11:06

## 2024-06-28 RX ADMIN — AMLODIPINE BESYLATE 10 MG: 10 TABLET ORAL at 11:06

## 2024-06-28 RX ADMIN — CARBAMAZEPINE 800 MG: 200 TABLET ORAL at 11:06

## 2024-06-28 NOTE — SUBJECTIVE & OBJECTIVE
Interval History:   6/28: staph bacteremia. Pending sensitivities. Will discuss removal of ports with ID. Consulted Blood bank specialist - best to hold off on exchange transfusion until infection source control obtained.     Review of Systems   Constitutional:  Positive for activity change.   HENT: Negative.     Respiratory:  Positive for apnea, chest tightness and shortness of breath.    Endocrine: Negative.    Genitourinary: Negative.    Musculoskeletal: Negative.    Allergic/Immunologic: Negative.    Neurological: Negative.    Hematological: Negative.      Objective:     Vital Signs (Most Recent):  Temp: 98.5 °F (36.9 °C) (06/28/24 1233)  Pulse: 64 (06/28/24 1413)  Resp: 18 (06/28/24 1233)  BP: 139/88 (06/28/24 1233)  SpO2: 100 % (06/28/24 1413) Vital Signs (24h Range):  Temp:  [97.6 °F (36.4 °C)-98.8 °F (37.1 °C)] 98.5 °F (36.9 °C)  Pulse:  [64-87] 64  Resp:  [13-20] 18  SpO2:  [96 %-100 %] 100 %  BP: (110-139)/(68-88) 139/88     Weight: 86.2 kg (190 lb 0.6 oz)  Body mass index is 34.76 kg/m².    Intake/Output Summary (Last 24 hours) at 6/28/2024 1515  Last data filed at 6/28/2024 1230  Gross per 24 hour   Intake 1336 ml   Output --   Net 1336 ml         Physical Exam  Constitutional:       Appearance: She is ill-appearing.   HENT:      Head: Normocephalic.      Nose: Nose normal.      Mouth/Throat:      Mouth: Mucous membranes are moist.   Cardiovascular:      Rate and Rhythm: Normal rate.      Pulses: Normal pulses.   Pulmonary:      Effort: Pulmonary effort is normal.   Chest:      Chest wall: Tenderness present.   Abdominal:      General: Abdomen is flat.      Palpations: Abdomen is soft.   Musculoskeletal:         General: Normal range of motion.      Cervical back: Normal range of motion.      Comments: Scar on the right lower extremity related to fasciotomy.    Skin:     General: Skin is warm.   Neurological:      General: No focal deficit present.             Significant Labs: All pertinent labs within  the past 24 hours have been reviewed.    Significant Imaging: I have reviewed all pertinent imaging results/findings within the past 24 hours.

## 2024-06-28 NOTE — PLAN OF CARE
Problem: Adult Inpatient Plan of Care  Goal: Plan of Care Review  Outcome: Progressing  Goal: Patient-Specific Goal (Individualized)  Outcome: Progressing  Goal: Absence of Hospital-Acquired Illness or Injury  Outcome: Progressing  Goal: Optimal Comfort and Wellbeing  Outcome: Progressing  Goal: Readiness for Transition of Care  Outcome: Progressing     Problem: Pneumonia  Goal: Fluid Balance  Outcome: Progressing  Goal: Resolution of Infection Signs and Symptoms  Outcome: Progressing  Goal: Effective Oxygenation and Ventilation  Outcome: Progressing     Problem: Fall Injury Risk  Goal: Absence of Fall and Fall-Related Injury  Outcome: Progressing     Patient alert and oriented. Cooperative with care. Slept most of shift today. Medicated for pain as ordered. Continues on iv NS @75/hr. Also given vanc x1 this shift per ivpb. Patient is resting comfortably at this time

## 2024-06-28 NOTE — ASSESSMENT & PLAN NOTE
44-year-old female with sickle cell disease admitted with chest pain and leg pain as well as lower back pain which is typical of her sickle cell and found to have GPC bacteremia.  She had a port replaced on the left side fairly recently after an infection on the right side.  She denies she has been having any systemic signs of infection but says she has had discomfort and itching at the new port site which is unusual and atypical for her.  This is concerning especially in the setting of bacteremia.  TTE has no mention of vegetations.  She is afebrile and white blood cell count is normal.  She is on vancomycin.    Plan:   1. Continue vancomycin  2. Obtain RIP  3. Follow-up blood culture speciation and repeat blood culture results.  4. May need port removal due to infection or possibly even due to discomfort  5. Seen with ID staff   6. Will follow

## 2024-06-28 NOTE — HPI
44-year-old female with a history of acute chest syndrome, sickle cell disease with beta thalassemia, asthma, pulmonary embolism in the past but has been off Eliquis secondary to insurance issues, depression, hypertension, morbid obesity, trigeminal neuralgia.  She presented to the ED with chest pain as well as leg pain x3 days.  Of note she has a port in her right chest that she uses for exchange transfusion.  She reports having an infection in a port previously and it was removed and replaced on the opposite side, the left.  Since it had been replaced she says she has had pain and itching there and something does not feel normal versus her prior ports.  Blood cultures were drawn on 06/26 and show 4/4 bottles GPC resembling staph with repeat that is no growth to date.  PCR shows non staph aureus non MRSA.  Chest x-ray shows lungs are clear.  CTA done and and was essentially nondiagnostic due to suboptimal bolus timing but no definite large acute central/saddle embolus was noted.  She has had a TTE without mention of vegetation.  She has been treated with vancomycin.  She has been afebrile since admit, white blood cell count is normal.  Patient denies any recent or current fevers, chills, or sweats.  Id is consulted for antibiotic recs.

## 2024-06-28 NOTE — PLAN OF CARE
Problem: Adult Inpatient Plan of Care  Goal: Plan of Care Review  Outcome: Progressing  Goal: Patient-Specific Goal (Individualized)  Outcome: Progressing  Goal: Absence of Hospital-Acquired Illness or Injury  Outcome: Progressing  Goal: Optimal Comfort and Wellbeing  Outcome: Progressing  Goal: Readiness for Transition of Care  Outcome: Progressing     Problem: Pneumonia  Goal: Fluid Balance  Outcome: Progressing  Goal: Resolution of Infection Signs and Symptoms  Outcome: Progressing  Goal: Effective Oxygenation and Ventilation  Outcome: Progressing     Problem: Fall Injury Risk  Goal: Absence of Fall and Fall-Related Injury  Outcome: Progressing

## 2024-06-28 NOTE — CONSULTS
[facility] ([place])  Infectious Disease  Consult Note    Patient Name: Nazanin Malone  MRN: [id number]  Admission Date: 6/26/2024  Hospital Length of Stay: 1 days  Attending Physician: Vijaya Acosta MD  Primary Care Provider: Pee Montgomery MD     Isolation Status: No active isolations    Patient information was obtained from patient, past medical records, and ER records.      Inpatient consult to Infectious Diseases  Consult performed by: Hung Oates Jr., PA  Consult ordered by: Vijaya Acosta MD        Assessment/Plan:     ID  Bacteremia  44-year-old female with sickle cell disease admitted with chest pain and leg pain as well as lower back pain which is typical of her sickle cell and found to have GPC bacteremia.  She had a port replaced on the left side fairly recently after an infection on the right side.  She denies she has been having any systemic signs of infection but says she has had discomfort and itching at the new port site which is unusual and atypical for her.  This is concerning especially in the setting of bacteremia.  TTE has no mention of vegetations.  She is afebrile and white blood cell count is normal.  She is on vancomycin.    Plan:   1. Continue vancomycin  2. Obtain RIP  3. Follow-up blood culture speciation and repeat blood culture results.  4. May need port removal due to infection or possibly even due to discomfort  5. Seen with ID staff   6. Will follow          Thank you for your consult. I will follow-up with patient. Please contact us if you have any additional questions.    IBETH Peralta  Infectious Disease  [facility] ([place])    Subjective:     Principal Problem: Sickle-cell disease with pain    HPI: 44-year-old female with a history of acute chest syndrome, sickle cell disease with beta thalassemia, asthma, pulmonary embolism in the past but has been off Eliquis secondary to insurance issues, depression,  hypertension, morbid obesity, trigeminal neuralgia.  She presented to the ED with chest pain as well as leg pain x3 days.  Of note she has a port in her right chest that she uses for exchange transfusion.  She reports having an infection in a port previously and it was removed and replaced on the opposite side, the left.  Since it had been replaced she says she has had pain and itching there and something does not feel normal versus her prior ports.  Blood cultures were drawn on  and show 4/4 bottles GPC resembling staph with repeat that is no growth to date.  PCR shows non staph aureus non MRSA.  Chest x-ray shows lungs are clear.  CTA done and and was essentially nondiagnostic due to suboptimal bolus timing but no definite large acute central/saddle embolus was noted.  She has had a TTE without mention of vegetation.  She has been treated with vancomycin.  She has been afebrile since admit, white blood cell count is normal.  Patient denies any recent or current fevers, chills, or sweats.  Id is consulted for antibiotic recs.    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    Asthma     Depression     Hypertension     Morbid obesity     Opioid dependence 2017    Pneumonia due to Streptococcus pneumoniae 2017    Right lower lobe pneumonia 2017    Sepsis due to Streptococcus pneumoniae 2017    Sickle cell-beta thalassemia disease with pain     Trigeminal neuralgia        Past Surgical History:   Procedure Laterality Date     SECTION      TONSILLECTOMY      TUBAL LIGATION         Review of patient's allergies indicates:  No Known Allergies    Medications:  Medications Prior to Admission   Medication Sig    amLODIPine (NORVASC) 10 MG tablet TAKE 1 TABLET BY MOUTH EVERY DAY    carvediloL (COREG) 25 MG tablet TAKE 1 TABLET BY MOUTH TWICE A DAY    acetaminophen (TYLENOL) 500 MG tablet  Take 1,000 mg by mouth daily as needed for Pain.    albuterol (VENTOLIN HFA) 90 mcg/actuation inhaler Inhale 2 puffs into the lungs every 6 (six) hours as needed for Wheezing or Shortness of Breath. Rescue    albuterol-ipratropium (DUO-NEB) 2.5 mg-0.5 mg/3 mL nebulizer solution Take 3 mLs by nebulization every 6 (six) hours as needed for Wheezing or Shortness of Breath. Rescue    ascorbic acid (VITAMIN C ORAL) Take 1 capsule by mouth once daily.    carBAMazepine (TEGRETOL) 200 mg tablet TAKE 4 TABLETS BY MOUTH TWICE DAILY    ELIQUIS 5 mg Tab TAKE 1 TABLET BY MOUTH TWICE A DAY    FLUoxetine 40 MG capsule TAKE 1 CAPSULE BY MOUTH EVERY DAY    fluticasone propionate (FLONASE) 50 mcg/actuation nasal spray INSTILL 1 SPRAY INTO EACH NOSTRIL EVERY DAY    folic acid (FOLVITE) 1 MG tablet Take 1 tablet (1 mg total) by mouth once daily.    gabapentin (NEURONTIN) 400 MG capsule TAKE 2 CAPSULES BY MOUTH 3 TIMES A DAY    glutamine, sickle cell, (ENDARI) 5 gram PwPk Take 3 packets (15 g) by mouth 2 (two) times daily.    hydroCHLOROthiazide (HYDRODIURIL) 25 MG tablet TAKE 1 TABLET BY MOUTH EVERY DAY    hydroxyurea (HYDREA) 500 mg Cap TAKE 2 CAPSULES (1,000 MG TOTAL) BY MOUTH 2 (TWO) TIMES DAILY.    ondansetron (ZOFRAN) 8 MG tablet TAKE 1 TABLET BY MOUTH EVERY 12 HOURS AS NEEDED FOR NAUSEA    potassium chloride SA (K-DUR,KLOR-CON) 20 MEQ tablet Take 1 tablet (20 mEq total) by mouth once daily.    promethazine (PHENERGAN) 25 MG tablet Take 1 tablet (25 mg total) by mouth every 6 (six) hours as needed for Nausea.    senna-docusate 8.6-50 mg (PERICOLACE) 8.6-50 mg per tablet Take 1 tablet by mouth 2 (two) times daily as needed for Constipation. (Patient taking differently: Take 1 tablet by mouth daily as needed for Constipation. )     Antibiotics (From admission, onward)      Start     Stop Route Frequency Ordered    06/28/24 1500  vancomycin 1,250 mg in D5W 250 mL IVPB (Vial-Mate)         -- IV Every 24 hours (non-standard times)  "06/27/24 1409    06/27/24 1345  vancomycin - pharmacy to dose  (vancomycin IVPB (PEDS and ADULTS))        Placed in "And" Linked Group    -- IV pharmacy to manage frequency 06/27/24 1246          Antifungals (From admission, onward)      None          Antivirals (From admission, onward)      None             Immunization History   Administered Date(s) Administered    HiB PRP-T 06/23/2017    Meningococcal Conjugate (MCV4P) 06/23/2017    Pneumococcal Conjugate - 13 Valent 06/23/2017       Family History       Problem Relation (Age of Onset)    Diabetes Mother    Heart disease Mother, Father          Social History     Socioeconomic History    Marital status: Single   Tobacco Use    Smoking status: Never    Smokeless tobacco: Never   Substance and Sexual Activity    Alcohol use: No    Drug use: Yes     Types: Marijuana     Comment: periodically     Sexual activity: Not Currently     Birth control/protection: See Surgical Hx     Social Determinants of Health     Financial Resource Strain: Medium Risk (6/28/2024)    Overall Financial Resource Strain (CARDIA)     Difficulty of Paying Living Expenses: Somewhat hard   Food Insecurity: Unknown (6/28/2024)    Hunger Vital Sign     Worried About Running Out of Food in the Last Year: Never true     Ran Out of Food in the Last Year: Patient unable to answer   Transportation Needs: No Transportation Needs (6/26/2024)    TRANSPORTATION NEEDS     Transportation : No   Physical Activity: Unknown (6/26/2024)    Exercise Vital Sign     Days of Exercise per Week: 0 days   Stress: Stress Concern Present (6/28/2024)    [employer] - Occupational Stress Questionnaire     Feeling of Stress : To some extent   Housing Stability: Low Risk  (6/28/2024)    Housing Stability Vital Sign     Unable to Pay for Housing in the Last Year: No     Homeless in the Last Year: No     Review of Systems   Constitutional:  Negative for appetite change, chills, diaphoresis, " fatigue, fever and unexpected weight change.   HENT:  Negative for congestion, ear pain, hearing loss, sore throat and tinnitus.    Eyes:  Negative for pain, redness and visual disturbance.   Respiratory:  Negative for cough, chest tightness, shortness of breath and wheezing.    Cardiovascular:  Positive for chest pain (sx suspected from sickle cell.).   Gastrointestinal:  Negative for abdominal pain, constipation, diarrhea, nausea and vomiting.   Endocrine: Negative for cold intolerance and heat intolerance.   Genitourinary:  Negative for decreased urine volume, difficulty urinating, dysuria, flank pain, frequency, hematuria and urgency.   Musculoskeletal:  Positive for arthralgias (usual LBP). Negative for back pain, myalgias and neck pain.   Skin:  Negative for rash and wound.        Itching and discomfort at port site   Allergic/Immunologic: Negative for environmental allergies, food allergies and immunocompromised state.   Neurological:  Negative for dizziness, facial asymmetry, weakness, light-headedness, numbness and headaches.   Hematological:  Negative for adenopathy. Does not bruise/bleed easily.   Psychiatric/Behavioral:  Negative for agitation, behavioral problems and confusion.      Objective:     Vital Signs (Most Recent):  Temp: 98.5 °F (36.9 °C) (06/28/24 1233)  Pulse: 83 (06/28/24 1233)  Resp: 18 (06/28/24 1233)  BP: 139/88 (06/28/24 1233)  SpO2: 96 % (06/28/24 1233) Vital Signs (24h Range):  Temp:  [97.6 °F (36.4 °C)-98.8 °F (37.1 °C)] 98.5 °F (36.9 °C)  Pulse:  [78-87] 83  Resp:  [13-20] 18  SpO2:  [96 %-100 %] 96 %  BP: (110-139)/(68-88) 139/88     Weight: 86.2 kg (190 lb 0.6 oz)  Body mass index is 34.76 kg/m².    Estimated Creatinine Clearance: 71.6 mL/min (based on SCr of 1 mg/dL).     Physical Exam  Constitutional:       General: She is not in acute distress.     Appearance: She is well-developed. She is obese. She is not ill-appearing, toxic-appearing or diaphoretic.       HENT:      Head:  Normocephalic and atraumatic.   Cardiovascular:      Rate and Rhythm: Normal rate and regular rhythm.      Heart sounds: Normal heart sounds. No murmur heard.     No friction rub. No gallop.   Pulmonary:      Effort: Pulmonary effort is normal. No respiratory distress.      Breath sounds: Normal breath sounds. No wheezing or rales.   Abdominal:      General: Bowel sounds are normal. There is no distension.      Palpations: Abdomen is soft. There is no mass.      Tenderness: There is no abdominal tenderness. There is no guarding or rebound.   Skin:     General: Skin is warm and dry.   Neurological:      Mental Status: She is alert and oriented to person, place, and time.   Psychiatric:         Behavior: Behavior normal.          Significant Labs: Blood Culture:   Recent Labs   Lab 06/26/24  1346 06/27/24  1532 06/27/24  1544   LABBLOO Gram stain kimberly bottle: Gram positive cocci in clusters resembling Staph  Results called to and read back by: Eileen Soler LPN 06/27/2024  09:34  Gram stain aer bottle: Gram positive cocci in clusters resembling Staph  Positive results previously called 06/27/2024  11:27  STAPHYLOCOCCUS EPIDERMIDIS  Susceptibility pending  *  Gram stain aer bottle: Gram positive cocci in clusters resembling Staph  Results called to and read back by: Eileen Soler LPN 06/27/2024  09:32  Gram stain kimberly bottle: Gram positive cocci in clusters resembling Staph  Positive results previously called 06/27/2024  11:26  STAPHYLOCOCCUS EPIDERMIDIS  Susceptibility pending  * No Growth to date No Growth to date     CBC:   Recent Labs   Lab 06/26/24  1345 06/26/24  1351 06/27/24  0347   WBC 6.15  --  7.96   HGB 12.1  --  10.2*   HCT 35.6* 40 30.4*     --  271     CMP:   Recent Labs   Lab 06/26/24  1345 06/28/24  0959    139   K 3.0* 4.4    108   CO2 24 23   * 80   BUN 7 11   CREATININE 1.2 1.0   CALCIUM 10.2 8.7   PROT 7.8  --    ALBUMIN 3.8  --    BILITOT 1.1*  --    ALKPHOS 79  --   "  AST 17  --    ALT 7*  --    ANIONGAP 11 8     Wound Culture: No results for input(s): "LABAERO" in the last 4320 hours.  All pertinent labs within the past 24 hours have been reviewed.    Significant Imaging: I have reviewed all pertinent imaging results/findings within the past 24 hours.  Procedure Component Value Units Date/Time   X-Ray Chest PA And Lateral [[id number]] Resulted: 06/26/24 1502   Order Status: Completed Updated: 06/26/24 1505   Narrative:     EXAMINATION:  XR CHEST PA AND LATERAL    CLINICAL HISTORY:  Chest Pain;    TECHNIQUE:  PA and lateral views of the chest were performed.    COMPARISON:  02/27/2021    FINDINGS:  Cardiac size is normal and aorta is tortuous.  Vascular catheters are seen with the tip in the superior vena cava.  Lungs are clear and well expanded with no infiltrate or pneumothorax.   Impression:       See above      Electronically signed by: Luis Manuel Reed MD  Date: 06/26/2024  Time: 15:02   CTA Chest Non-Coronary (PE Studies) [[id number]] Resulted: 06/26/24 1445   Order Status: Completed Updated: 06/26/24 1447   Narrative:     EXAMINATION:  CTA CHEST NON CORONARY (PE STUDIES)    CLINICAL HISTORY:  Pulmonary embolism (PE) suspected, high prob;    TECHNIQUE:  Low dose axial images, sagittal and coronal reformations were obtained from the thoracic inlet to the lung bases following the IV administration of 100 mL of Omnipaque 350.  Contrast timing was optimized to evaluate the pulmonary arteries.  MIP images were performed.    COMPARISON:  CTA chest 06/08/2020.    FINDINGS:  Exam quality: Examination is essentially nondiagnostic beyond the levels of the main right and left pulmonary arteries due to suboptimal contrast bolus timing and respiratory motion.    Pulmonary embolism: No convincing evidence of acute large central/saddle-type embolus.    Central pulmonary arteries: Normal caliber.    Aorta: Left-sided arch.  Normal caliber.  Incidental note is made of anatomic variant " aberrant right subclavian artery, with retroesophageal course.    Heart: No right heart strain. Normal size.    Coronary arteries: No calcifications.    Pericardium: Normal. No effusion, thickening, or calcification.    Support tubes and lines: Right internal jugular approach port catheter.    Base of neck/thyroid: Normal.    Lymph nodes: No supraclavicular, axillary, internal mammary, mediastinal, or hilar adenopathy.    Esophagus: Normal.    Pleura: No effusion, thickening, or calcification.    Upper abdomen: Ill-defined patchy enhancement noted in the right posterior hepatic lobe (for example as seen on axial series 2, image 369).  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    Body wall: Unremarkable    Airways: Central airways grossly patent.    Lungs: Assessment compromised by respiratory motion.  Noting this, no definite acute appearing focal or diffuse process is appreciated.    Bones: No definite acute abnormality.   Impression:       1. Examination is essentially nondiagnostic for assessment of the pulmonary arteries beyond the level of the main right and left pulmonary arteries due to suboptimal contrast bolus timing and respiratory motion.  2. No definite acute large central/saddle-type embolus.  3. No definite acute intrathoracic findings.  4. Additional details, as provided in the body of report.      Electronically signed by: Reggie Rodrigues  Date: 06/26/2024  Time: 14:45      Imaging History     2024    Date Procedure Name Study Review Link PACS Link Status Accession Number Location   06/27/24 06:46 PM Cardiac monitoring strips Study Review  Final     06/27/24 02:57 PM Echo Study Review  Images Final [id number] [facility]   06/26/24 03:00 PM X-Ray Chest PA And Lateral Study Review  Images Final [id number] [facility]   06/26/24 02:15 PM CTA Chest Non-Coronary (PE Studies) Study Review  Images Final [id number] [facility]

## 2024-06-28 NOTE — SUBJECTIVE & OBJECTIVE
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    Asthma     Depression     Hypertension     Morbid obesity     Opioid dependence 2017    Pneumonia due to Streptococcus pneumoniae 2017    Right lower lobe pneumonia 2017    Sepsis due to Streptococcus pneumoniae 2017    Sickle cell-beta thalassemia disease with pain     Trigeminal neuralgia        Past Surgical History:   Procedure Laterality Date     SECTION      TONSILLECTOMY      TUBAL LIGATION         Review of patient's allergies indicates:  No Known Allergies    Medications:  Medications Prior to Admission   Medication Sig    amLODIPine (NORVASC) 10 MG tablet TAKE 1 TABLET BY MOUTH EVERY DAY    carvediloL (COREG) 25 MG tablet TAKE 1 TABLET BY MOUTH TWICE A DAY    acetaminophen (TYLENOL) 500 MG tablet Take 1,000 mg by mouth daily as needed for Pain.    albuterol (VENTOLIN HFA) 90 mcg/actuation inhaler Inhale 2 puffs into the lungs every 6 (six) hours as needed for Wheezing or Shortness of Breath. Rescue    albuterol-ipratropium (DUO-NEB) 2.5 mg-0.5 mg/3 mL nebulizer solution Take 3 mLs by nebulization every 6 (six) hours as needed for Wheezing or Shortness of Breath. Rescue    ascorbic acid (VITAMIN C ORAL) Take 1 capsule by mouth once daily.    carBAMazepine (TEGRETOL) 200 mg tablet TAKE 4 TABLETS BY MOUTH TWICE DAILY    ELIQUIS 5 mg Tab TAKE 1 TABLET BY MOUTH TWICE A DAY    FLUoxetine 40 MG capsule TAKE 1 CAPSULE BY MOUTH EVERY DAY    fluticasone propionate (FLONASE) 50 mcg/actuation nasal spray INSTILL 1 SPRAY INTO EACH NOSTRIL EVERY DAY    folic acid (FOLVITE) 1 MG tablet Take 1 tablet (1 mg total) by mouth once daily.    gabapentin (NEURONTIN) 400 MG capsule TAKE 2 CAPSULES BY MOUTH 3 TIMES A DAY    glutamine, sickle cell, (ENDARI) 5 gram PwPk Take 3 packets (15 g) by mouth 2 (two) times daily.    hydroCHLOROthiazide  "(HYDRODIURIL) 25 MG tablet TAKE 1 TABLET BY MOUTH EVERY DAY    hydroxyurea (HYDREA) 500 mg Cap TAKE 2 CAPSULES (1,000 MG TOTAL) BY MOUTH 2 (TWO) TIMES DAILY.    ondansetron (ZOFRAN) 8 MG tablet TAKE 1 TABLET BY MOUTH EVERY 12 HOURS AS NEEDED FOR NAUSEA    potassium chloride SA (K-DUR,KLOR-CON) 20 MEQ tablet Take 1 tablet (20 mEq total) by mouth once daily.    promethazine (PHENERGAN) 25 MG tablet Take 1 tablet (25 mg total) by mouth every 6 (six) hours as needed for Nausea.    senna-docusate 8.6-50 mg (PERICOLACE) 8.6-50 mg per tablet Take 1 tablet by mouth 2 (two) times daily as needed for Constipation. (Patient taking differently: Take 1 tablet by mouth daily as needed for Constipation. )     Antibiotics (From admission, onward)      Start     Stop Route Frequency Ordered    06/28/24 1500  vancomycin 1,250 mg in D5W 250 mL IVPB (Vial-Mate)         -- IV Every 24 hours (non-standard times) 06/27/24 1409    06/27/24 1345  vancomycin - pharmacy to dose  (vancomycin IVPB (PEDS and ADULTS))        Placed in "And" Linked Group    -- IV pharmacy to manage frequency 06/27/24 1246          Antifungals (From admission, onward)      None          Antivirals (From admission, onward)      None             Immunization History   Administered Date(s) Administered    HiB PRP-T 06/23/2017    Meningococcal Conjugate (MCV4P) 06/23/2017    Pneumococcal Conjugate - 13 Valent 06/23/2017       Family History       Problem Relation (Age of Onset)    Diabetes Mother    Heart disease Mother, Father          Social History     Socioeconomic History    Marital status: Single   Tobacco Use    Smoking status: Never    Smokeless tobacco: Never   Substance and Sexual Activity    Alcohol use: No    Drug use: Yes     Types: Marijuana     Comment: periodically     Sexual activity: Not Currently     Birth control/protection: See Surgical Hx     Social Determinants of Health     Financial Resource Strain: Medium Risk (6/28/2024)    Overall Financial " Resource Strain (CARDIA)     Difficulty of Paying Living Expenses: Somewhat hard   Food Insecurity: Unknown (6/28/2024)    Hunger Vital Sign     Worried About Running Out of Food in the Last Year: Never true     Ran Out of Food in the Last Year: Patient unable to answer   Transportation Needs: No Transportation Needs (6/26/2024)    TRANSPORTATION NEEDS     Transportation : No   Physical Activity: Unknown (6/26/2024)    Exercise Vital Sign     Days of Exercise per Week: 0 days   Stress: Stress Concern Present (6/28/2024)    [employer] [place] [employer] - Occupational Stress Questionnaire     Feeling of Stress : To some extent   Housing Stability: Low Risk  (6/28/2024)    Housing Stability Vital Sign     Unable to Pay for Housing in the Last Year: No     Homeless in the Last Year: No     Review of Systems   Constitutional:  Negative for appetite change, chills, diaphoresis, fatigue, fever and unexpected weight change.   HENT:  Negative for congestion, ear pain, hearing loss, sore throat and tinnitus.    Eyes:  Negative for pain, redness and visual disturbance.   Respiratory:  Negative for cough, chest tightness, shortness of breath and wheezing.    Cardiovascular:  Positive for chest pain (sx suspected from sickle cell.).   Gastrointestinal:  Negative for abdominal pain, constipation, diarrhea, nausea and vomiting.   Endocrine: Negative for cold intolerance and heat intolerance.   Genitourinary:  Negative for decreased urine volume, difficulty urinating, dysuria, flank pain, frequency, hematuria and urgency.   Musculoskeletal:  Positive for arthralgias (usual LBP). Negative for back pain, myalgias and neck pain.   Skin:  Negative for rash and wound.        Itching and discomfort at port site   Allergic/Immunologic: Negative for environmental allergies, food allergies and immunocompromised state.   Neurological:  Negative for dizziness, facial asymmetry, weakness, light-headedness, numbness and headaches.    Hematological:  Negative for adenopathy. Does not bruise/bleed easily.   Psychiatric/Behavioral:  Negative for agitation, behavioral problems and confusion.      Objective:     Vital Signs (Most Recent):  Temp: 98.5 °F (36.9 °C) (06/28/24 1233)  Pulse: 83 (06/28/24 1233)  Resp: 18 (06/28/24 1233)  BP: 139/88 (06/28/24 1233)  SpO2: 96 % (06/28/24 1233) Vital Signs (24h Range):  Temp:  [97.6 °F (36.4 °C)-98.8 °F (37.1 °C)] 98.5 °F (36.9 °C)  Pulse:  [78-87] 83  Resp:  [13-20] 18  SpO2:  [96 %-100 %] 96 %  BP: (110-139)/(68-88) 139/88     Weight: 86.2 kg (190 lb 0.6 oz)  Body mass index is 34.76 kg/m².    Estimated Creatinine Clearance: 71.6 mL/min (based on SCr of 1 mg/dL).     Physical Exam  Constitutional:       General: She is not in acute distress.     Appearance: She is well-developed. She is obese. She is not ill-appearing, toxic-appearing or diaphoretic.       HENT:      Head: Normocephalic and atraumatic.   Cardiovascular:      Rate and Rhythm: Normal rate and regular rhythm.      Heart sounds: Normal heart sounds. No murmur heard.     No friction rub. No gallop.   Pulmonary:      Effort: Pulmonary effort is normal. No respiratory distress.      Breath sounds: Normal breath sounds. No wheezing or rales.   Abdominal:      General: Bowel sounds are normal. There is no distension.      Palpations: Abdomen is soft. There is no mass.      Tenderness: There is no abdominal tenderness. There is no guarding or rebound.   Skin:     General: Skin is warm and dry.   Neurological:      Mental Status: She is alert and oriented to person, place, and time.   Psychiatric:         Behavior: Behavior normal.          Significant Labs: Blood Culture:   Recent Labs   Lab 06/26/24  1346 06/27/24  1532 06/27/24  1544   LABBLOO Gram stain kimberly bottle: Gram positive cocci in clusters resembling Staph  Results called to and read back by: Eileen Soler LPN 06/27/2024  09:34  Gram stain aer bottle: Gram positive cocci in clusters  "resembling Staph  Positive results previously called 06/27/2024  11:27  STAPHYLOCOCCUS EPIDERMIDIS  Susceptibility pending  *  Gram stain aer bottle: Gram positive cocci in clusters resembling Staph  Results called to and read back by: Eileen Soler LPN 06/27/2024  09:32  Gram stain kimberly bottle: Gram positive cocci in clusters resembling Staph  Positive results previously called 06/27/2024  11:26  STAPHYLOCOCCUS EPIDERMIDIS  Susceptibility pending  * No Growth to date No Growth to date     CBC:   Recent Labs   Lab 06/26/24  1345 06/26/24  1351 06/27/24  0347   WBC 6.15  --  7.96   HGB 12.1  --  10.2*   HCT 35.6* 40 30.4*     --  271     CMP:   Recent Labs   Lab 06/26/24  1345 06/28/24  0959    139   K 3.0* 4.4    108   CO2 24 23   * 80   BUN 7 11   CREATININE 1.2 1.0   CALCIUM 10.2 8.7   PROT 7.8  --    ALBUMIN 3.8  --    BILITOT 1.1*  --    ALKPHOS 79  --    AST 17  --    ALT 7*  --    ANIONGAP 11 8     Wound Culture: No results for input(s): "LABAERO" in the last 4320 hours.  All pertinent labs within the past 24 hours have been reviewed.    Significant Imaging: I have reviewed all pertinent imaging results/findings within the past 24 hours.  Procedure Component Value Units Date/Time   X-Ray Chest PA And Lateral [[id number]] Resulted: 06/26/24 1502   Order Status: Completed Updated: 06/26/24 1505   Narrative:     EXAMINATION:  XR CHEST PA AND LATERAL    CLINICAL HISTORY:  Chest Pain;    TECHNIQUE:  PA and lateral views of the chest were performed.    COMPARISON:  02/27/2021    FINDINGS:  Cardiac size is normal and aorta is tortuous.  Vascular catheters are seen with the tip in the superior vena cava.  Lungs are clear and well expanded with no infiltrate or pneumothorax.   Impression:       See above      Electronically signed by: Luis Manuel Reed MD  Date: 06/26/2024  Time: 15:02   CTA Chest Non-Coronary (PE Studies) [[id number]] Resulted: 06/26/24 1445   Order Status: Completed " Updated: 06/26/24 1447   Narrative:     EXAMINATION:  CTA CHEST NON CORONARY (PE STUDIES)    CLINICAL HISTORY:  Pulmonary embolism (PE) suspected, high prob;    TECHNIQUE:  Low dose axial images, sagittal and coronal reformations were obtained from the thoracic inlet to the lung bases following the IV administration of 100 mL of Omnipaque 350.  Contrast timing was optimized to evaluate the pulmonary arteries.  MIP images were performed.    COMPARISON:  CTA chest 06/08/2020.    FINDINGS:  Exam quality: Examination is essentially nondiagnostic beyond the levels of the main right and left pulmonary arteries due to suboptimal contrast bolus timing and respiratory motion.    Pulmonary embolism: No convincing evidence of acute large central/saddle-type embolus.    Central pulmonary arteries: Normal caliber.    Aorta: Left-sided arch.  Normal caliber.  Incidental note is made of anatomic variant aberrant right subclavian artery, with retroesophageal course.    Heart: No right heart strain. Normal size.    Coronary arteries: No calcifications.    Pericardium: Normal. No effusion, thickening, or calcification.    Support tubes and lines: Right internal jugular approach port catheter.    Base of neck/thyroid: Normal.    Lymph nodes: No supraclavicular, axillary, internal mammary, mediastinal, or hilar adenopathy.    Esophagus: Normal.    Pleura: No effusion, thickening, or calcification.    Upper abdomen: Ill-defined patchy enhancement noted in the right posterior hepatic lobe (for example as seen on axial series 2, image 369).  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    Body wall: Unremarkable    Airways: Central airways grossly patent.    Lungs: Assessment compromised by respiratory motion.  Noting this, no definite acute appearing focal or diffuse process is appreciated.    Bones: No definite acute abnormality.   Impression:       1. Examination is essentially nondiagnostic for assessment of the pulmonary  arteries beyond the level of the main right and left pulmonary arteries due to suboptimal contrast bolus timing and respiratory motion.  2. No definite acute large central/saddle-type embolus.  3. No definite acute intrathoracic findings.  4. Additional details, as provided in the body of report.      Electronically signed by: Reggie Rodrigues  Date: 06/26/2024  Time: 14:45      Imaging History     2024    Date Procedure Name Study Review Link PACS Link Status Accession Number Location   06/27/24 06:46 PM Cardiac monitoring strips Study Review  Final     06/27/24 02:57 PM Echo Study Review  Images Final [id number] [facility]   06/26/24 03:00 PM X-Ray Chest PA And Lateral Study Review  Images Final [id number] [facility]   06/26/24 02:15 PM CTA Chest Non-Coronary (PE Studies) Study Review  Images Final [id number] [facility]

## 2024-06-28 NOTE — RESPIRATORY THERAPY
RAPID RESPONSE RESPIRATORY CHART CHECK       Chart check completed.  Please call [phone] for further concerns or assistance.

## 2024-06-28 NOTE — PROGRESS NOTES
[facility] [id number])  [facility] Medicine  Progress Note    Patient Name: Nazanin Malone  MRN: [id number]  Patient Class: IP- Inpatient   Admission Date: 6/26/2024  Length of Stay: 1 days  Attending Physician: Vijaya Acosta MD  Primary Care Provider: Pee Montgomery MD        Subjective:     Principal Problem:Sickle-cell disease with pain        HPI:  44 y.o. female presenting with chest pain and leg pain for about 3 days.  History of acute chest syndrome secondary to sickle cell anemia.  History of sickle cell beta thalassemia.  Has had multiple acute chest syndrome, pneumonia.  Also history of pulmonary embolism but has been off of her Eliquis for several months due to insurance related issues.  Does not take folic acid or hydroxyurea either.  Has a port in the right side of her chest, history of needing exchange transfusions.  Fairly significant history of acute chest syndrome, rhabdomyolysis resulting in fasciotomy of the right lower extremity, just recently as of last year October 20, 2023.  Recently moved here from [place], drove here 3 days ago.  No new leg swelling or muscle pains.    Overview/Hospital Course:  No notes on file    Interval History:   6/28: staph bacteremia. Pending sensitivities. Will discuss removal of ports with ID. Consulted Blood bank specialist - best to hold off on exchange transfusion until infection source control obtained.     Review of Systems   Constitutional:  Positive for activity change.   HENT: Negative.     Respiratory:  Positive for apnea, chest tightness and shortness of breath.    Endocrine: Negative.    Genitourinary: Negative.    Musculoskeletal: Negative.    Allergic/Immunologic: Negative.    Neurological: Negative.    Hematological: Negative.      Objective:     Vital Signs (Most Recent):  Temp: 98.5 °F (36.9 °C) (06/28/24 1233)  Pulse: 64 (06/28/24 1413)  Resp: 18 (06/28/24 1233)  BP: 139/88 (06/28/24 1233)  SpO2: 100 % (06/28/24 1413)  Vital Signs (24h Range):  Temp:  [97.6 °F (36.4 °C)-98.8 °F (37.1 °C)] 98.5 °F (36.9 °C)  Pulse:  [64-87] 64  Resp:  [13-20] 18  SpO2:  [96 %-100 %] 100 %  BP: (110-139)/(68-88) 139/88     Weight: 86.2 kg (190 lb 0.6 oz)  Body mass index is 34.76 kg/m².    Intake/Output Summary (Last 24 hours) at 6/28/2024 1515  Last data filed at 6/28/2024 1230  Gross per 24 hour   Intake 1336 ml   Output --   Net 1336 ml         Physical Exam  Constitutional:       Appearance: She is ill-appearing.   HENT:      Head: Normocephalic.      Nose: Nose normal.      Mouth/Throat:      Mouth: Mucous membranes are moist.   Cardiovascular:      Rate and Rhythm: Normal rate.      Pulses: Normal pulses.   Pulmonary:      Effort: Pulmonary effort is normal.   Chest:      Chest wall: Tenderness present.   Abdominal:      General: Abdomen is flat.      Palpations: Abdomen is soft.   Musculoskeletal:         General: Normal range of motion.      Cervical back: Normal range of motion.      Comments: Scar on the right lower extremity related to fasciotomy.    Skin:     General: Skin is warm.   Neurological:      General: No focal deficit present.             Significant Labs: All pertinent labs within the past 24 hours have been reviewed.    Significant Imaging: I have reviewed all pertinent imaging results/findings within the past 24 hours.    Assessment/Plan:      * Sickle-cell disease with pain  History of Acute Chest Syndrome and Pulmonary Embolisim  IVF for now  Dilaudid 2mg q4 prn  Has not been seen here in three years, will consult hem onc to reestablish care      Hypokalemia  Patient has hypokalemia which is Acute and currently uncontrolled. Most recent potassium levels reviewed-   Lab Results   Component Value Date    K 3.0 (L) 06/26/2024   . Will continue potassium replacement per protocol and recheck repeat levels after replacement completed.     History of pulmonary embolism  CTA today: 1. Examination is essentially nondiagnostic for  assessment of the pulmonary arteries beyond the level of the main right and left pulmonary arteries due to suboptimal contrast bolus timing and respiratory motion.  2. No definite acute large central/saddle-type embolus.  Off of Elequis  Will restart Eliquis at 5mg BID, low threshold to increase to treatment dose.      Intermittent asthma  Stable      Anxiety        Trigeminal neuralgia  Complains of mild pain      Iron deficiency anemia  Patient's anemia is currently controlled. Has not received any PRBCs to date. Etiology likely d/t chronic blood loss  Current CBC reviewed-   Lab Results   Component Value Date    HGB 12.1 06/26/2024    HCT 40 06/26/2024     Monitor serial CBC and transfuse if patient becomes hemodynamically unstable, symptomatic or H/H drops below 7/21.      VTE Risk Mitigation (From admission, onward)           Ordered     apixaban tablet 5 mg  2 times daily         06/26/24 1846     IP VTE HIGH RISK PATIENT  Once         06/26/24 1846     Place sequential compression device  Until discontinued         06/26/24 1846                    Discharge Planning   ALYSE: 7/1/2024     Code Status: Full Code   Is the patient medically ready for discharge?:     Reason for patient still in hospital (select all that apply): Patient trending condition  Discharge Plan A: Home with family   Discharge Delays: None known at this time              Vijaya Acosta MD  Department of Hospital Medicine   [facility] ([place])

## 2024-06-29 LAB
BACTERIA BLD CULT: ABNORMAL
VANCOMYCIN TROUGH SERPL-MCNC: 12.9 UG/ML (ref 10–22)

## 2024-06-29 PROCEDURE — 87581 M.PNEUMON DNA AMP PROBE: CPT | Performed by: HOSPITALIST

## 2024-06-29 PROCEDURE — 80202 ASSAY OF VANCOMYCIN: CPT | Performed by: HOSPITALIST

## 2024-06-29 PROCEDURE — 25000003 PHARM REV CODE 250: Performed by: INTERNAL MEDICINE

## 2024-06-29 PROCEDURE — 25000003 PHARM REV CODE 250: Performed by: HOSPITALIST

## 2024-06-29 PROCEDURE — 63600175 PHARM REV CODE 636 W HCPCS: Performed by: HOSPITALIST

## 2024-06-29 PROCEDURE — 36415 COLL VENOUS BLD VENIPUNCTURE: CPT | Performed by: HOSPITALIST

## 2024-06-29 PROCEDURE — 63600175 PHARM REV CODE 636 W HCPCS: Performed by: INTERNAL MEDICINE

## 2024-06-29 PROCEDURE — 20600001 HC STEP DOWN PRIVATE ROOM

## 2024-06-29 PROCEDURE — 99233 SBSQ HOSP IP/OBS HIGH 50: CPT | Mod: ,,, | Performed by: PHYSICIAN ASSISTANT

## 2024-06-29 RX ADMIN — AMLODIPINE BESYLATE 10 MG: 10 TABLET ORAL at 09:06

## 2024-06-29 RX ADMIN — DIPHENHYDRAMINE HYDROCHLORIDE 25 MG: 50 INJECTION, SOLUTION INTRAMUSCULAR; INTRAVENOUS at 08:06

## 2024-06-29 RX ADMIN — HYDROMORPHONE HYDROCHLORIDE 2 MG: 1 INJECTION, SOLUTION INTRAMUSCULAR; INTRAVENOUS; SUBCUTANEOUS at 01:06

## 2024-06-29 RX ADMIN — PREGABALIN 50 MG: 50 CAPSULE ORAL at 09:06

## 2024-06-29 RX ADMIN — DIPHENHYDRAMINE HYDROCHLORIDE 25 MG: 50 INJECTION, SOLUTION INTRAMUSCULAR; INTRAVENOUS at 01:06

## 2024-06-29 RX ADMIN — CARBAMAZEPINE 800 MG: 200 TABLET ORAL at 08:06

## 2024-06-29 RX ADMIN — HYDROMORPHONE HYDROCHLORIDE 2 MG: 1 INJECTION, SOLUTION INTRAMUSCULAR; INTRAVENOUS; SUBCUTANEOUS at 08:06

## 2024-06-29 RX ADMIN — GABAPENTIN 800 MG: 400 CAPSULE ORAL at 08:06

## 2024-06-29 RX ADMIN — MORPHINE SULFATE 15 MG: 15 TABLET, EXTENDED RELEASE ORAL at 08:06

## 2024-06-29 RX ADMIN — CARVEDILOL 25 MG: 25 TABLET, FILM COATED ORAL at 08:06

## 2024-06-29 RX ADMIN — APIXABAN 5 MG: 5 TABLET, FILM COATED ORAL at 08:06

## 2024-06-29 RX ADMIN — GABAPENTIN 800 MG: 400 CAPSULE ORAL at 09:06

## 2024-06-29 RX ADMIN — MORPHINE SULFATE 15 MG: 15 TABLET, EXTENDED RELEASE ORAL at 09:06

## 2024-06-29 RX ADMIN — OXYCODONE AND ACETAMINOPHEN 1 TABLET: 10; 325 TABLET ORAL at 11:06

## 2024-06-29 RX ADMIN — CARVEDILOL 25 MG: 25 TABLET, FILM COATED ORAL at 09:06

## 2024-06-29 RX ADMIN — CARBAMAZEPINE 800 MG: 200 TABLET ORAL at 10:06

## 2024-06-29 RX ADMIN — POTASSIUM CHLORIDE 20 MEQ: 1500 TABLET, EXTENDED RELEASE ORAL at 09:06

## 2024-06-29 RX ADMIN — VANCOMYCIN HYDROCHLORIDE 1250 MG: 1.25 INJECTION, POWDER, LYOPHILIZED, FOR SOLUTION INTRAVENOUS at 03:06

## 2024-06-29 RX ADMIN — PREGABALIN 50 MG: 50 CAPSULE ORAL at 08:06

## 2024-06-29 RX ADMIN — FLUOXETINE HYDROCHLORIDE 40 MG: 20 CAPSULE ORAL at 09:06

## 2024-06-29 RX ADMIN — APIXABAN 5 MG: 5 TABLET, FILM COATED ORAL at 09:06

## 2024-06-29 NOTE — NURSING
Reached out to Md to notify of patient drowsy status and hold hs meds, no return call noted, AM nurse notified, safety maintained, will continue to monitor.

## 2024-06-29 NOTE — PROGRESS NOTES
[facility] ([place])  Infectious Disease  Progress Note    Patient Name: Nazanin Malone  MRN: [id number]  Admission Date: 6/26/2024  Length of Stay: 2 days  Attending Physician: Vijaya Acosta MD  Primary Care Provider: Pee Montgomery MD    Isolation Status: No active isolations  Assessment/Plan:      ID  Bacteremia  44-year-old female with sickle cell disease admitted with chest pain and leg pain as well as lower back pain which is typical of her sickle cell and found to have GPC bacteremia.  She had a port replaced on the left side fairly recently after an infection and migration of port on L side.  She has had discomfort and itching at the new L sided port site which is unusual and atypical for her.  This is concerning especially in the setting of bacteremia.  TTE has no mention of vegetations.  She is afebrile and white blood cell count is normal.  She is on vancomycin.  RIP in process.    Blood cultures 4/4 Staph Epi - same sensitivities.  Repeats NGTD.  Patient today further details that she lived in [place] and while there had a infection in the left port as well as it had migrated and it was removed and replaced after a couple of days of removal.  She does not remember taking any significant amount of antibiotics.  She denies that she has had any problems on the right port in her chest.  She says that after the left side was replaced she had ongoing intermittent fevers and night sweats.     Plan:   1. Continue vancomycin  2. FU RIP  3. Follow-up repeat blood culture results.  4. Suspect may need port removal of both ports due to infection as unsure how was treated in past, has discomfort at L port, and has bacteremia of unclear source.  This would allow for definitive treatment as will need clean port access ongoing in the future.  5. Will follow          Anticipated Disposition: tbd    Thank you for your consult. I will follow-up with patient. Please contact us if you have  any additional questions.    IBETH Peralta  Infectious Disease  [facility] ([place])    Subjective:     Principal Problem:Sickle-cell disease with pain    HPI: 44-year-old female with a history of acute chest syndrome, sickle cell disease with beta thalassemia, asthma, pulmonary embolism in the past but has been off Eliquis secondary to insurance issues, depression, hypertension, morbid obesity, trigeminal neuralgia.  She presented to the ED with chest pain as well as leg pain x3 days.  Of note she has a port in her right chest that she uses for exchange transfusion.  She reports having an infection in a port previously and it was removed and replaced on the opposite side, the left.  Since it had been replaced she says she has had pain and itching there and something does not feel normal versus her prior ports.  Blood cultures were drawn on 06/26 and show 4/4 bottles GPC resembling staph with repeat that is no growth to date.  PCR shows non staph aureus non MRSA.  Chest x-ray shows lungs are clear.  CTA done and and was essentially nondiagnostic due to suboptimal bolus timing but no definite large acute central/saddle embolus was noted.  She has had a TTE without mention of vegetation.  She has been treated with vancomycin.  She has been afebrile since admit, white blood cell count is normal.  Patient denies any recent or current fevers, chills, or sweats.  Id is consulted for antibiotic recs.  Interval History:   No acute events overnight   Afebrile  Blood cultures with 4/4 Staph epidermidis - same sensitivities  Repeat blood cultures are no growth to date  Reports chills but denies fevers or sweats.    Review of Systems   Constitutional:  Positive for chills and fatigue. Negative for activity change, diaphoresis and fever.   Respiratory:  Negative for cough, shortness of breath and wheezing.    Cardiovascular:  Positive for chest pain (SS sx).   Gastrointestinal:  Negative for  abdominal pain, constipation, diarrhea, nausea and vomiting.   Genitourinary:  Negative for dysuria, frequency and urgency.   Musculoskeletal:  Positive for back pain (SS sx).   Neurological:  Negative for dizziness.   Hematological:  Does not bruise/bleed easily.     Objective:     Vital Signs (Most Recent):  Temp: 99.4 °F (37.4 °C) (06/29/24 0753)  Pulse: 88 (06/29/24 0753)  Resp: 18 (06/29/24 0819)  BP: (!) 150/80 (06/29/24 0753)  SpO2: 99 % (06/29/24 0753) Vital Signs (24h Range):  Temp:  [97.6 °F (36.4 °C)-99.4 °F (37.4 °C)] 99.4 °F (37.4 °C)  Pulse:  [64-88] 88  Resp:  [11-18] 18  SpO2:  [96 %-100 %] 99 %  BP: (110-159)/(68-99) 150/80     Weight: 86.2 kg (190 lb 0.6 oz)  Body mass index is 34.76 kg/m².    Estimated Creatinine Clearance: 71.6 mL/min (based on SCr of 1 mg/dL).     Physical Exam  Constitutional:       General: She is not in acute distress.     Appearance: She is well-developed. She is obese. She is not ill-appearing, toxic-appearing or diaphoretic.       HENT:      Head: Normocephalic and atraumatic.   Cardiovascular:      Rate and Rhythm: Normal rate and regular rhythm.      Heart sounds: Normal heart sounds. No murmur heard.     No friction rub. No gallop.   Pulmonary:      Effort: Pulmonary effort is normal. No respiratory distress.      Breath sounds: Normal breath sounds. No wheezing or rales.   Abdominal:      General: Bowel sounds are normal. There is no distension.      Palpations: Abdomen is soft. There is no mass.      Tenderness: There is no abdominal tenderness. There is no guarding or rebound.   Skin:     General: Skin is warm and dry.   Neurological:      Mental Status: She is alert and oriented to person, place, and time.   Psychiatric:         Behavior: Behavior normal.        Significant Labs: Blood Culture:   Recent Labs   Lab 06/26/24  1346 06/27/24  1532 06/27/24  1544   LABBLOO Gram stain kimberly bottle: Gram positive cocci in clusters resembling Staph  Results called to and  "read back by: Eileen Soler LPN 06/27/2024  09:34  Gram stain aer bottle: Gram positive cocci in clusters resembling Staph  Positive results previously called 06/27/2024  11:27  STAPHYLOCOCCUS EPIDERMIDIS  Susceptibility pending  *  Gram stain aer bottle: Gram positive cocci in clusters resembling Staph  Results called to and read back by: Eileen Soler LPN 06/27/2024  09:32  Gram stain kimberly bottle: Gram positive cocci in clusters resembling Staph  Positive results previously called 06/27/2024  11:26  STAPHYLOCOCCUS EPIDERMIDIS  Susceptibility pending  * No Growth to date  No Growth to date No Growth to date  No Growth to date     CBC: No results for input(s): "WBC", "HGB", "HCT", "PLT" in the last 48 hours.  CMP:   Recent Labs   Lab 06/28/24  0959      K 4.4      CO2 23   GLU 80   BUN 11   CREATININE 1.0   CALCIUM 8.7   ANIONGAP 8     Wound Culture: No results for input(s): "LABAERO" in the last 4320 hours.  All pertinent labs within the past 24 hours have been reviewed.    Significant Imaging: I have reviewed all pertinent imaging results/findings within the past 24 hours.  Procedure Component Value Units Date/Time   X-Ray Chest PA And Lateral [[id number]] Resulted: 06/26/24 1502   Order Status: Completed Updated: 06/26/24 1505   Narrative:     EXAMINATION:  XR CHEST PA AND LATERAL    CLINICAL HISTORY:  Chest Pain;    TECHNIQUE:  PA and lateral views of the chest were performed.    COMPARISON:  02/27/2021    FINDINGS:  Cardiac size is normal and aorta is tortuous.  Vascular catheters are seen with the tip in the superior vena cava.  Lungs are clear and well expanded with no infiltrate or pneumothorax.   Impression:       See above      Electronically signed by: Luis Manuel Reed MD  Date: 06/26/2024  Time: 15:02   CTA Chest Non-Coronary (PE Studies) [[id number]] Resulted: 06/26/24 1445   Order Status: Completed Updated: 06/26/24 1447   Narrative:     EXAMINATION:  CTA CHEST NON CORONARY (PE " STUDIES)    CLINICAL HISTORY:  Pulmonary embolism (PE) suspected, high prob;    TECHNIQUE:  Low dose axial images, sagittal and coronal reformations were obtained from the thoracic inlet to the lung bases following the IV administration of 100 mL of Omnipaque 350.  Contrast timing was optimized to evaluate the pulmonary arteries.  MIP images were performed.    COMPARISON:  CTA chest 06/08/2020.    FINDINGS:  Exam quality: Examination is essentially nondiagnostic beyond the levels of the main right and left pulmonary arteries due to suboptimal contrast bolus timing and respiratory motion.    Pulmonary embolism: No convincing evidence of acute large central/saddle-type embolus.    Central pulmonary arteries: Normal caliber.    Aorta: Left-sided arch.  Normal caliber.  Incidental note is made of anatomic variant aberrant right subclavian artery, with retroesophageal course.    Heart: No right heart strain. Normal size.    Coronary arteries: No calcifications.    Pericardium: Normal. No effusion, thickening, or calcification.    Support tubes and lines: Right internal jugular approach port catheter.    Base of neck/thyroid: Normal.    Lymph nodes: No supraclavicular, axillary, internal mammary, mediastinal, or hilar adenopathy.    Esophagus: Normal.    Pleura: No effusion, thickening, or calcification.    Upper abdomen: Ill-defined patchy enhancement noted in the right posterior hepatic lobe (for example as seen on axial series 2, image 369).  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    Body wall: Unremarkable    Airways: Central airways grossly patent.    Lungs: Assessment compromised by respiratory motion.  Noting this, no definite acute appearing focal or diffuse process is appreciated.    Bones: No definite acute abnormality.   Impression:       1. Examination is essentially nondiagnostic for assessment of the pulmonary arteries beyond the level of the main right and left pulmonary arteries due to  suboptimal contrast bolus timing and respiratory motion.  2. No definite acute large central/saddle-type embolus.  3. No definite acute intrathoracic findings.  4. Additional details, as provided in the body of report.      Electronically signed by: Reggie Rodrigeus  Date: 06/26/2024  Time: 14:45      Imaging History     2024    Date Procedure Name Study Review Link PACS Link Status Accession Number Location   06/28/24 05:55 PM Cardiac monitoring strips Study Review  Final     06/28/24 05:55 PM Cardiac monitoring strips Study Review  Final     06/27/24 06:46 PM Cardiac monitoring strips Study Review  Final     06/27/24 02:57 PM Echo Study Review  Images Final [id number] [facility]   06/26/24 03:00 PM X-Ray Chest PA And Lateral Study Review  Images Final [id number] [facility]   06/26/24 02:15 PM CTA Chest Non-Coronary (PE Studies) Study Review  Images Final [id number] [facility]

## 2024-06-29 NOTE — PROGRESS NOTES
Pharmacokinetic Assessment Follow Up: IV Vancomycin    Vancomycin serum concentration assessment(s):    The trough level was drawn correctly and can be used to guide therapy at this time. The measurement is below the desired definitive target range of 15 to 20 mcg/mL.    Vancomycin Regimen Plan:    Change regimen to Vancomycin 1000 mg IV every 12 hours with next serum trough concentration measured at 1400 prior to 4th dose on 7/1/24    Drug levels (last 3 results):  Recent Labs   Lab Result Units 06/29/24  1417   Vancomycin-Trough ug/mL 12.9       Pharmacy will continue to follow and monitor vancomycin.    Please contact pharmacy at extension [phone] for questions regarding this assessment.    Thank you for the consult,   Dario Benson       Patient brief summary:  Nazanin Malone is a 44 y.o. female initiated on antimicrobial therapy with IV Vancomycin for treatment of bacteremia    The patient's current regimen is 1250 mg Q 24 H    Drug Allergies:   Review of patient's allergies indicates:  No Known Allergies    Actual Body Weight:   86.2 kg    Renal Function:   Estimated Creatinine Clearance: 71.6 mL/min (based on SCr of 1 mg/dL).,     Dialysis Method (if applicable):  N/A    CBC (last 72 hours):  Recent Labs   Lab Result Units 06/27/24  0347   WBC K/uL 7.96   Hemoglobin g/dL 10.2*   Hematocrit % 30.4*   Platelets K/uL 271   Gran % % 34.8*   Lymph % % 47.5   Mono % % 13.3   Eosinophil % % 3.8   Basophil % % 0.5   Differential Method  Automated       Metabolic Panel (last 72 hours):  Recent Labs   Lab Result Units 06/28/24  0959   Sodium mmol/L 139   Potassium mmol/L 4.4   Chloride mmol/L 108   CO2 mmol/L 23   Glucose mg/dL 80   BUN mg/dL 11   Creatinine mg/dL 1.0       Vancomycin Administrations:  vancomycin given in the last 96 hours                     vancomycin 1,250 mg in D5W 250 mL IVPB (Vial-Mate) (mg) 1,250 mg New Bag 06/29/24 1542     1,250 mg New Bag 06/28/24 1558    vancomycin 1.75 g in 5 % dextrose  500 mL IVPB (mg) 1,750 mg New Bag 06/27/24 1546                    Microbiologic Results:  Microbiology Results (last 7 days)       Procedure Component Value Units Date/Time    Respiratory Infection Panel (PCR), Nasopharyngeal [[id number]] Collected: 06/29/24 1030    Order Status: Completed Specimen: Nasopharyngeal Swab Updated: 06/29/24 1241     Respiratory Infection Panel Source NP Swab    Narrative:      Assay not valid for lower respiratory specimens, alternate  testing required.    Blood culture #2 **CANNOT BE ORDERED STAT** [[id number]]  (Abnormal)  (Susceptibility) Collected: 06/26/24 1346    Order Status: Completed Specimen: Blood from Peripheral, Antecubital, Left Updated: 06/29/24 1041     Blood Culture, Routine Gram stain kimberly bottle: Gram positive cocci in clusters resembling Staph      Results called to and read back by: Eileen Soler LPN 06/27/2024  09:34      Gram stain aer bottle: Gram positive cocci in clusters resembling Staph      Positive results previously called 06/27/2024  11:27      STAPHYLOCOCCUS EPIDERMIDIS    Blood culture #1 **CANNOT BE ORDERED STAT** [[id number]]  (Abnormal)  (Susceptibility) Collected: 06/26/24 1346    Order Status: Completed Specimen: Blood from Peripheral, Antecubital, Right Updated: 06/29/24 1040     Blood Culture, Routine Gram stain aer bottle: Gram positive cocci in clusters resembling Staph      Results called to and read back by: Eileen Soler LPN 06/27/2024  09:32      Gram stain kimberly bottle: Gram positive cocci in clusters resembling Staph      Positive results previously called 06/27/2024  11:26      STAPHYLOCOCCUS EPIDERMIDIS    Respiratory Infection Panel (PCR), Nasopharyngeal [[id number]] Collected: 06/29/24 0802    Order Status: Canceled Specimen: Nasopharyngeal Swab     Blood culture [[id number]] Collected: 06/27/24 1544    Order Status: Completed Specimen: Blood Updated: 06/28/24 1812     Blood Culture, Routine No Growth to date      No Growth to date     Narrative:      Collection has been rescheduled by DF2 at 06/27/2024 14:22 Reason:   Patient unavailable.  Eileen RN ext [phone].  Collection has been rescheduled by DF2 at 06/27/2024 14:22 Reason:   Patient unavailable.  Eileen RN ext [phone].    Blood culture [[id number]] Collected: 06/27/24 1532    Order Status: Completed Specimen: Blood Updated: 06/28/24 1812     Blood Culture, Routine No Growth to date      No Growth to date    Narrative:      Collection has been rescheduled by DF2 at 06/27/2024 14:22 Reason:   Patient unavailable.  Eileen RN ext [phone].  Collection has been rescheduled by DF2 at 06/27/2024 14:22 Reason:   Patient unavailable.  Eileen RN ext [phone].    MRSA/SA Rapid ID by PCR from Blood culture [[id number]] Collected: 06/26/24 1346    Order Status: Completed Updated: 06/27/24 1111     Staph aureus ID by PCR Negative     Methicillin Resistant ID by PCR Negative

## 2024-06-29 NOTE — ASSESSMENT & PLAN NOTE
44-year-old female with sickle cell disease admitted with chest pain and leg pain as well as lower back pain which is typical of her sickle cell and found to have GPC bacteremia.  She had a port replaced on the left side fairly recently after an infection and migration of port on L side.  She has had discomfort and itching at the new L sided port site which is unusual and atypical for her.  This is concerning especially in the setting of bacteremia.  TTE has no mention of vegetations.  She is afebrile and white blood cell count is normal.  She is on vancomycin.  RIP in process.    Blood cultures 4/4 Staph Epi - same sensitivities.  Repeats NGTD.  Patient today further details that she lived in [place] and while there had a infection in the left port as well as it had migrated and it was removed and replaced after a couple of days of removal.  She does not remember taking any significant amount of antibiotics.  She denies that she has had any problems on the right port in her chest.  She says that after the left side was replaced she had ongoing intermittent fevers and night sweats.     Plan:   1. Continue vancomycin  2. FU RIP  3. Follow-up repeat blood culture results.  4. Suspect may need port removal of both ports due to infection as unsure how was treated in past, has discomfort at L port, and has bacteremia of unclear source.  This would allow for definitive treatment as will need clean port access ongoing in the future.  5. Will follow

## 2024-06-29 NOTE — SUBJECTIVE & OBJECTIVE
Interval History:   No acute events overnight   Afebrile  Blood cultures with 4/4 Staph epidermidis - same sensitivities  Repeat blood cultures are no growth to date  Reports chills but denies fevers or sweats.    Review of Systems   Constitutional:  Positive for chills and fatigue. Negative for activity change, diaphoresis and fever.   Respiratory:  Negative for cough, shortness of breath and wheezing.    Cardiovascular:  Positive for chest pain (SS sx).   Gastrointestinal:  Negative for abdominal pain, constipation, diarrhea, nausea and vomiting.   Genitourinary:  Negative for dysuria, frequency and urgency.   Musculoskeletal:  Positive for back pain (SS sx).   Neurological:  Negative for dizziness.   Hematological:  Does not bruise/bleed easily.     Objective:     Vital Signs (Most Recent):  Temp: 99.4 °F (37.4 °C) (06/29/24 0753)  Pulse: 88 (06/29/24 0753)  Resp: 18 (06/29/24 0819)  BP: (!) 150/80 (06/29/24 0753)  SpO2: 99 % (06/29/24 0753) Vital Signs (24h Range):  Temp:  [97.6 °F (36.4 °C)-99.4 °F (37.4 °C)] 99.4 °F (37.4 °C)  Pulse:  [64-88] 88  Resp:  [11-18] 18  SpO2:  [96 %-100 %] 99 %  BP: (110-159)/(68-99) 150/80     Weight: 86.2 kg (190 lb 0.6 oz)  Body mass index is 34.76 kg/m².    Estimated Creatinine Clearance: 71.6 mL/min (based on SCr of 1 mg/dL).     Physical Exam  Constitutional:       General: She is not in acute distress.     Appearance: She is well-developed. She is obese. She is not ill-appearing, toxic-appearing or diaphoretic.       HENT:      Head: Normocephalic and atraumatic.   Cardiovascular:      Rate and Rhythm: Normal rate and regular rhythm.      Heart sounds: Normal heart sounds. No murmur heard.     No friction rub. No gallop.   Pulmonary:      Effort: Pulmonary effort is normal. No respiratory distress.      Breath sounds: Normal breath sounds. No wheezing or rales.   Abdominal:      General: Bowel sounds are normal. There is no distension.      Palpations: Abdomen is soft. There  "is no mass.      Tenderness: There is no abdominal tenderness. There is no guarding or rebound.   Skin:     General: Skin is warm and dry.   Neurological:      Mental Status: She is alert and oriented to person, place, and time.   Psychiatric:         Behavior: Behavior normal.        Significant Labs: Blood Culture:   Recent Labs   Lab 06/26/24  1346 06/27/24  1532 06/27/24  1544   LABBLOO Gram stain kimberly bottle: Gram positive cocci in clusters resembling Staph  Results called to and read back by: Eileen Soler LPN 06/27/2024  09:34  Gram stain aer bottle: Gram positive cocci in clusters resembling Staph  Positive results previously called 06/27/2024  11:27  STAPHYLOCOCCUS EPIDERMIDIS  Susceptibility pending  *  Gram stain aer bottle: Gram positive cocci in clusters resembling Staph  Results called to and read back by: Eileen Soler LPN 06/27/2024  09:32  Gram stain kimberly bottle: Gram positive cocci in clusters resembling Staph  Positive results previously called 06/27/2024  11:26  STAPHYLOCOCCUS EPIDERMIDIS  Susceptibility pending  * No Growth to date  No Growth to date No Growth to date  No Growth to date     CBC: No results for input(s): "WBC", "HGB", "HCT", "PLT" in the last 48 hours.  CMP:   Recent Labs   Lab 06/28/24  0959      K 4.4      CO2 23   GLU 80   BUN 11   CREATININE 1.0   CALCIUM 8.7   ANIONGAP 8     Wound Culture: No results for input(s): "LABAERO" in the last 4320 hours.  All pertinent labs within the past 24 hours have been reviewed.    Significant Imaging: I have reviewed all pertinent imaging results/findings within the past 24 hours.  Procedure Component Value Units Date/Time   X-Ray Chest PA And Lateral [[id number]] Resulted: 06/26/24 1502   Order Status: Completed Updated: 06/26/24 1505   Narrative:     EXAMINATION:  XR CHEST PA AND LATERAL    CLINICAL HISTORY:  Chest Pain;    TECHNIQUE:  PA and lateral views of the chest were " performed.    COMPARISON:  02/27/2021    FINDINGS:  Cardiac size is normal and aorta is tortuous.  Vascular catheters are seen with the tip in the superior vena cava.  Lungs are clear and well expanded with no infiltrate or pneumothorax.   Impression:       See above      Electronically signed by: Luis Manuel Reed MD  Date: 06/26/2024  Time: 15:02   CTA Chest Non-Coronary (PE Studies) [[id number]] Resulted: 06/26/24 1445   Order Status: Completed Updated: 06/26/24 1447   Narrative:     EXAMINATION:  CTA CHEST NON CORONARY (PE STUDIES)    CLINICAL HISTORY:  Pulmonary embolism (PE) suspected, high prob;    TECHNIQUE:  Low dose axial images, sagittal and coronal reformations were obtained from the thoracic inlet to the lung bases following the IV administration of 100 mL of Omnipaque 350.  Contrast timing was optimized to evaluate the pulmonary arteries.  MIP images were performed.    COMPARISON:  CTA chest 06/08/2020.    FINDINGS:  Exam quality: Examination is essentially nondiagnostic beyond the levels of the main right and left pulmonary arteries due to suboptimal contrast bolus timing and respiratory motion.    Pulmonary embolism: No convincing evidence of acute large central/saddle-type embolus.    Central pulmonary arteries: Normal caliber.    Aorta: Left-sided arch.  Normal caliber.  Incidental note is made of anatomic variant aberrant right subclavian artery, with retroesophageal course.    Heart: No right heart strain. Normal size.    Coronary arteries: No calcifications.    Pericardium: Normal. No effusion, thickening, or calcification.    Support tubes and lines: Right internal jugular approach port catheter.    Base of neck/thyroid: Normal.    Lymph nodes: No supraclavicular, axillary, internal mammary, mediastinal, or hilar adenopathy.    Esophagus: Normal.    Pleura: No effusion, thickening, or calcification.    Upper abdomen: Ill-defined patchy enhancement noted in the right posterior hepatic lobe (for  example as seen on axial series 2, image 369).  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    Body wall: Unremarkable    Airways: Central airways grossly patent.    Lungs: Assessment compromised by respiratory motion.  Noting this, no definite acute appearing focal or diffuse process is appreciated.    Bones: No definite acute abnormality.   Impression:       1. Examination is essentially nondiagnostic for assessment of the pulmonary arteries beyond the level of the main right and left pulmonary arteries due to suboptimal contrast bolus timing and respiratory motion.  2. No definite acute large central/saddle-type embolus.  3. No definite acute intrathoracic findings.  4. Additional details, as provided in the body of report.      Electronically signed by: Reggie Rodrigues  Date: 06/26/2024  Time: 14:45      Imaging History     2024    Date Procedure Name Study Review Link PACS Link Status Accession Number Location   06/28/24 05:55 PM Cardiac monitoring strips Study Review  Final     06/28/24 05:55 PM Cardiac monitoring strips Study Review  Final     06/27/24 06:46 PM Cardiac monitoring strips Study Review  Final     06/27/24 02:57 PM Echo Study Review  Images Final [id number] [facility]   06/26/24 03:00 PM X-Ray Chest PA And Lateral Study Review  Images Final [id number] [facility]   06/26/24 02:15 PM CTA Chest Non-Coronary (PE Studies) Study Review  Images Final [id number] [facility]

## 2024-06-29 NOTE — PROGRESS NOTES
[facility] [facility]  Progress Note    Patient Name: Nazanin Malone  MRN: [id number]  Patient Class: IP- Inpatient   Admission Date: 6/26/2024  Length of Stay: 2 days  Attending Physician: Vijaya Acosta MD  Primary Care Provider: Pee Montgomery MD        Subjective:     Principal Problem:Sickle-cell disease with pain        HPI:  44 y.o. female presenting with chest pain and leg pain for about 3 days.  History of acute chest syndrome secondary to sickle cell anemia.  History of sickle cell beta thalassemia.  Has had multiple acute chest syndrome, pneumonia.  Also history of pulmonary embolism but has been off of her Eliquis for several months due to insurance related issues.  Does not take folic acid or hydroxyurea either.  Has a port in the right side of her chest, history of needing exchange transfusions.  Fairly significant history of acute chest syndrome, rhabdomyolysis resulting in fasciotomy of the right lower extremity, just recently as of last year October 20, 2023.  Recently moved here from [place], drove here 3 days ago.  No new leg swelling or muscle pains.    Overview/Hospital Course:  No notes on file    Interval History:   6/29: Case discussed with ID and blood bank transfusion specialist. Pending speciation of bacteremia. May need port removal    Review of Systems   Constitutional:  Positive for activity change.   HENT: Negative.     Respiratory:  Positive for apnea, chest tightness and shortness of breath.    Endocrine: Negative.    Genitourinary: Negative.    Musculoskeletal: Negative.    Allergic/Immunologic: Negative.    Neurological: Negative.    Hematological: Negative.      Objective:     Vital Signs (Most Recent):  Temp: 99.4 °F (37.4 °C) (06/29/24 0753)  Pulse: 88 (06/29/24 0753)  Resp: 18 (06/29/24 0903)  BP: (!) 150/80 (06/29/24 0753)  SpO2: 99 % (06/29/24 0753) Vital Signs (24h Range):  Temp:  [97.6 °F (36.4 °C)-99.4 °F (37.4 °C)] 99.4 °F  (37.4 °C)  Pulse:  [64-88] 88  Resp:  [11-18] 18  SpO2:  [96 %-100 %] 99 %  BP: (110-159)/(68-99) 150/80     Weight: 86.2 kg (190 lb 0.6 oz)  Body mass index is 34.76 kg/m².    Intake/Output Summary (Last 24 hours) at 6/29/2024 1047  Last data filed at 6/29/2024 0643  Gross per 24 hour   Intake 1236 ml   Output --   Net 1236 ml         Physical Exam  Constitutional:       Appearance: She is ill-appearing.   HENT:      Head: Normocephalic.      Nose: Nose normal.      Mouth/Throat:      Mouth: Mucous membranes are moist.   Cardiovascular:      Rate and Rhythm: Normal rate.      Pulses: Normal pulses.   Pulmonary:      Effort: Pulmonary effort is normal.   Chest:      Chest wall: Tenderness present.   Abdominal:      General: Abdomen is flat.      Palpations: Abdomen is soft.   Musculoskeletal:         General: Normal range of motion.      Cervical back: Normal range of motion.      Comments: Scar on the right lower extremity related to fasciotomy.    Skin:     General: Skin is warm.   Neurological:      General: No focal deficit present.             Significant Labs: All pertinent labs within the past 24 hours have been reviewed.    Significant Imaging: I have reviewed all pertinent imaging results/findings within the past 24 hours.    Assessment/Plan:      * Sickle-cell disease with pain  History of Acute Chest Syndrome and Pulmonary Embolisim  IVF for now  Dilaudid 2mg q4 prn  Has not been seen here in three years, will consult hem onc to reestablish care      Hypokalemia  Patient has hypokalemia which is Acute and currently uncontrolled. Most recent potassium levels reviewed-   Lab Results   Component Value Date    K 3.0 (L) 06/26/2024   . Will continue potassium replacement per protocol and recheck repeat levels after replacement completed.     History of pulmonary embolism  CTA today: 1. Examination is essentially nondiagnostic for assessment of the pulmonary arteries beyond the level of the main right and left  pulmonary arteries due to suboptimal contrast bolus timing and respiratory motion.  2. No definite acute large central/saddle-type embolus.  Off of Elequis  Will restart Eliquis at 5mg BID, low threshold to increase to treatment dose.      Intermittent asthma  Stable      Anxiety        Trigeminal neuralgia  Complains of mild pain      Iron deficiency anemia  Patient's anemia is currently controlled. Has not received any PRBCs to date. Etiology likely d/t chronic blood loss  Current CBC reviewed-   Lab Results   Component Value Date    HGB 12.1 06/26/2024    HCT 40 06/26/2024     Monitor serial CBC and transfuse if patient becomes hemodynamically unstable, symptomatic or H/H drops below 7/21.      VTE Risk Mitigation (From admission, onward)           Ordered     apixaban tablet 5 mg  2 times daily         06/26/24 1846     IP VTE HIGH RISK PATIENT  Once         06/26/24 1846     Place sequential compression device  Until discontinued         06/26/24 1846                    Discharge Planning   ALYSE: 7/1/2024     Code Status: Full Code   Is the patient medically ready for discharge?:     Reason for patient still in hospital (select all that apply): Patient trending condition  Discharge Plan A: Home with family   Discharge Delays: None known at this time              Vijaya Acosta MD  Department of Hospital Medicine   [facility] - [facility] ([place])

## 2024-06-29 NOTE — PLAN OF CARE
Problem: Adult Inpatient Plan of Care  Goal: Plan of Care Review  Outcome: Progressing  Goal: Patient-Specific Goal (Individualized)  Outcome: Progressing  Goal: Absence of Hospital-Acquired Illness or Injury  Outcome: Progressing  Goal: Optimal Comfort and Wellbeing  Outcome: Progressing  Goal: Readiness for Transition of Care  Outcome: Progressing     Problem: Pneumonia  Goal: Fluid Balance  Outcome: Progressing  Goal: Resolution of Infection Signs and Symptoms  Outcome: Progressing  Goal: Effective Oxygenation and Ventilation  Outcome: Progressing     Problem: Fall Injury Risk  Goal: Absence of Fall and Fall-Related Injury  Outcome: Progressing     Patient alert and oriented. Cooperative with care. Medicated for pain as ordered. Held gabapentin per pt request. Continues on iv vanc. Tolerated well. Continues on iv fluids as well. Patient resting comfortably at this time

## 2024-06-29 NOTE — SUBJECTIVE & OBJECTIVE
Interval History:   6/29: Case discussed with ID and blood bank transfusion specialist. Pending speciation of bacteremia. May need port removal    Review of Systems   Constitutional:  Positive for activity change.   HENT: Negative.     Respiratory:  Positive for apnea, chest tightness and shortness of breath.    Endocrine: Negative.    Genitourinary: Negative.    Musculoskeletal: Negative.    Allergic/Immunologic: Negative.    Neurological: Negative.    Hematological: Negative.      Objective:     Vital Signs (Most Recent):  Temp: 99.4 °F (37.4 °C) (06/29/24 0753)  Pulse: 88 (06/29/24 0753)  Resp: 18 (06/29/24 0903)  BP: (!) 150/80 (06/29/24 0753)  SpO2: 99 % (06/29/24 0753) Vital Signs (24h Range):  Temp:  [97.6 °F (36.4 °C)-99.4 °F (37.4 °C)] 99.4 °F (37.4 °C)  Pulse:  [64-88] 88  Resp:  [11-18] 18  SpO2:  [96 %-100 %] 99 %  BP: (110-159)/(68-99) 150/80     Weight: 86.2 kg (190 lb 0.6 oz)  Body mass index is 34.76 kg/m².    Intake/Output Summary (Last 24 hours) at 6/29/2024 1047  Last data filed at 6/29/2024 0643  Gross per 24 hour   Intake 1236 ml   Output --   Net 1236 ml         Physical Exam  Constitutional:       Appearance: She is ill-appearing.   HENT:      Head: Normocephalic.      Nose: Nose normal.      Mouth/Throat:      Mouth: Mucous membranes are moist.   Cardiovascular:      Rate and Rhythm: Normal rate.      Pulses: Normal pulses.   Pulmonary:      Effort: Pulmonary effort is normal.   Chest:      Chest wall: Tenderness present.   Abdominal:      General: Abdomen is flat.      Palpations: Abdomen is soft.   Musculoskeletal:         General: Normal range of motion.      Cervical back: Normal range of motion.      Comments: Scar on the right lower extremity related to fasciotomy.    Skin:     General: Skin is warm.   Neurological:      General: No focal deficit present.             Significant Labs: All pertinent labs within the past 24 hours have been reviewed.    Significant Imaging: I have reviewed  all pertinent imaging results/findings within the past 24 hours.

## 2024-06-30 LAB
ADENOVIRUS: NOT DETECTED
BORDETELLA PARAPERTUSSIS (IS1001): NOT DETECTED
BORDETELLA PERTUSSIS (PTXP): NOT DETECTED
CHLAMYDIA PNEUMONIAE: NOT DETECTED
CORONAVIRUS 229E, COMMON COLD VIRUS: NOT DETECTED
CORONAVIRUS HKU1, COMMON COLD VIRUS: NOT DETECTED
CORONAVIRUS NL63, COMMON COLD VIRUS: NOT DETECTED
CORONAVIRUS OC43, COMMON COLD VIRUS: NOT DETECTED
CRP SERPL-MCNC: 3.4 MG/L (ref 0–8.2)
FLUBV RNA NPH QL NAA+NON-PROBE: NOT DETECTED
HPIV1 RNA NPH QL NAA+NON-PROBE: NOT DETECTED
HPIV2 RNA NPH QL NAA+NON-PROBE: NOT DETECTED
HPIV3 RNA NPH QL NAA+NON-PROBE: NOT DETECTED
HPIV4 RNA NPH QL NAA+NON-PROBE: NOT DETECTED
HUMAN METAPNEUMOVIRUS: NOT DETECTED
INFLUENZA A (SUBTYPES H1,H1-2009,H3): NOT DETECTED
MYCOPLASMA PNEUMONIAE: NOT DETECTED
RESPIRATORY INFECTION PANEL SOURCE: NORMAL
RSV RNA NPH QL NAA+NON-PROBE: NOT DETECTED
RV+EV RNA NPH QL NAA+NON-PROBE: NOT DETECTED
SARS-COV-2 RNA RESP QL NAA+PROBE: NOT DETECTED

## 2024-06-30 PROCEDURE — 25000003 PHARM REV CODE 250: Performed by: INTERNAL MEDICINE

## 2024-06-30 PROCEDURE — 25000003 PHARM REV CODE 250: Performed by: HOSPITALIST

## 2024-06-30 PROCEDURE — 63600175 PHARM REV CODE 636 W HCPCS: Performed by: INTERNAL MEDICINE

## 2024-06-30 PROCEDURE — 99232 SBSQ HOSP IP/OBS MODERATE 35: CPT | Mod: ,,, | Performed by: PHYSICIAN ASSISTANT

## 2024-06-30 PROCEDURE — 20600001 HC STEP DOWN PRIVATE ROOM

## 2024-06-30 PROCEDURE — 63600175 PHARM REV CODE 636 W HCPCS: Performed by: HOSPITALIST

## 2024-06-30 PROCEDURE — 86140 C-REACTIVE PROTEIN: CPT | Performed by: PHYSICIAN ASSISTANT

## 2024-06-30 PROCEDURE — 36415 COLL VENOUS BLD VENIPUNCTURE: CPT | Performed by: PHYSICIAN ASSISTANT

## 2024-06-30 RX ORDER — DIPHENHYDRAMINE HCL 25 MG
25 CAPSULE ORAL EVERY 6 HOURS PRN
Status: DISCONTINUED | OUTPATIENT
Start: 2024-06-30 | End: 2024-07-01

## 2024-06-30 RX ORDER — OXYCODONE AND ACETAMINOPHEN 10; 325 MG/1; MG/1
1 TABLET ORAL EVERY 6 HOURS PRN
Status: DISCONTINUED | OUTPATIENT
Start: 2024-06-30 | End: 2024-07-02

## 2024-06-30 RX ORDER — HYDROMORPHONE HYDROCHLORIDE 1 MG/ML
1 INJECTION, SOLUTION INTRAMUSCULAR; INTRAVENOUS; SUBCUTANEOUS EVERY 6 HOURS PRN
Status: DISCONTINUED | OUTPATIENT
Start: 2024-06-30 | End: 2024-07-01

## 2024-06-30 RX ADMIN — VANCOMYCIN HYDROCHLORIDE 1000 MG: 1 INJECTION, POWDER, LYOPHILIZED, FOR SOLUTION INTRAVENOUS at 04:06

## 2024-06-30 RX ADMIN — CARBAMAZEPINE 800 MG: 200 TABLET ORAL at 10:06

## 2024-06-30 RX ADMIN — FLUOXETINE HYDROCHLORIDE 40 MG: 20 CAPSULE ORAL at 09:06

## 2024-06-30 RX ADMIN — AMLODIPINE BESYLATE 10 MG: 10 TABLET ORAL at 09:06

## 2024-06-30 RX ADMIN — DIPHENHYDRAMINE HYDROCHLORIDE 25 MG: 50 INJECTION, SOLUTION INTRAMUSCULAR; INTRAVENOUS at 09:06

## 2024-06-30 RX ADMIN — CARBAMAZEPINE 800 MG: 200 TABLET ORAL at 09:06

## 2024-06-30 RX ADMIN — APIXABAN 5 MG: 5 TABLET, FILM COATED ORAL at 09:06

## 2024-06-30 RX ADMIN — CARVEDILOL 25 MG: 25 TABLET, FILM COATED ORAL at 09:06

## 2024-06-30 RX ADMIN — PREGABALIN 50 MG: 50 CAPSULE ORAL at 09:06

## 2024-06-30 RX ADMIN — VANCOMYCIN HYDROCHLORIDE 1000 MG: 1 INJECTION, POWDER, LYOPHILIZED, FOR SOLUTION INTRAVENOUS at 03:06

## 2024-06-30 RX ADMIN — MORPHINE SULFATE 15 MG: 15 TABLET, EXTENDED RELEASE ORAL at 09:06

## 2024-06-30 RX ADMIN — HYDROMORPHONE HYDROCHLORIDE 2 MG: 1 INJECTION, SOLUTION INTRAMUSCULAR; INTRAVENOUS; SUBCUTANEOUS at 09:06

## 2024-06-30 RX ADMIN — POTASSIUM CHLORIDE 20 MEQ: 1500 TABLET, EXTENDED RELEASE ORAL at 09:06

## 2024-06-30 RX ADMIN — GABAPENTIN 800 MG: 400 CAPSULE ORAL at 09:06

## 2024-06-30 NOTE — PROGRESS NOTES
[facility] [facility]  [facility] Note    Patient Name: Nazanin Malone  MRN: [id number]  Patient Class: IP- Inpatient   Admission Date: 6/26/2024  Length of Stay: 3 days  Attending Physician: Vijaya Acosta MD  Primary Care Provider: Pee Montgomery MD        Subjective:     Principal Problem:Sickle-cell disease with pain        HPI:  44 y.o. female presenting with chest pain and leg pain for about 3 days.  History of acute chest syndrome secondary to sickle cell anemia.  History of sickle cell beta thalassemia.  Has had multiple acute chest syndrome, pneumonia.  Also history of pulmonary embolism but has been off of her Eliquis for several months due to insurance related issues.  Does not take folic acid or hydroxyurea either.  Has a port in the right side of her chest, history of needing exchange transfusions.  Fairly significant history of acute chest syndrome, rhabdomyolysis resulting in fasciotomy of the right lower extremity, just recently as of last year October 20, 2023.  Recently moved here from [place], drove here 3 days ago.  No new leg swelling or muscle pains.    Overview/Hospital Course:  No notes on file    Interval History:   6/30: Sleeping during rounds.     Review of Systems   Constitutional:  Positive for activity change.   HENT: Negative.     Respiratory:  Positive for apnea, chest tightness and shortness of breath.    Endocrine: Negative.    Genitourinary: Negative.    Musculoskeletal: Negative.    Allergic/Immunologic: Negative.    Neurological: Negative.    Hematological: Negative.      Objective:     Vital Signs (Most Recent):  Temp: 98.3 °F (36.8 °C) (06/30/24 0756)  Pulse: 84 (06/30/24 1110)  Resp: 20 (06/30/24 0921)  BP: (!) 137/95 (06/30/24 0756)  SpO2: 95 % (06/30/24 1110) Vital Signs (24h Range):  Temp:  [98.3 °F (36.8 °C)-98.9 °F (37.2 °C)] 98.3 °F (36.8 °C)  Pulse:  [72-87] 84  Resp:  [13-20] 20  SpO2:  [95 %-99 %] 95 %  BP:  ()/(50-95) 137/95     Weight: 86.2 kg (190 lb 0.6 oz)  Body mass index is 34.76 kg/m².  No intake or output data in the 24 hours ending 06/30/24 1116      Physical Exam  Constitutional:       Appearance: She is ill-appearing.   HENT:      Head: Normocephalic.      Nose: Nose normal.      Mouth/Throat:      Mouth: Mucous membranes are moist.   Cardiovascular:      Rate and Rhythm: Normal rate.      Pulses: Normal pulses.   Pulmonary:      Effort: Pulmonary effort is normal.   Chest:      Chest wall: Tenderness present.   Abdominal:      General: Abdomen is flat.      Palpations: Abdomen is soft.   Musculoskeletal:         General: Normal range of motion.      Cervical back: Normal range of motion.      Comments: Scar on the right lower extremity related to fasciotomy.    Skin:     General: Skin is warm.   Neurological:      General: No focal deficit present.             Significant Labs: All pertinent labs within the past 24 hours have been reviewed.    Significant Imaging: I have reviewed all pertinent imaging results/findings within the past 24 hours.    Assessment/Plan:      * Sickle-cell disease with pain  History of Acute Chest Syndrome and Pulmonary Embolisim  IVF for now  Dilaudid 2mg q4 prn  Has not been seen here in three years, will consult hem onc to reestablish care      Hypokalemia  Patient has hypokalemia which is Acute and currently uncontrolled. Most recent potassium levels reviewed-   Lab Results   Component Value Date    K 3.0 (L) 06/26/2024   . Will continue potassium replacement per protocol and recheck repeat levels after replacement completed.     History of pulmonary embolism  CTA today: 1. Examination is essentially nondiagnostic for assessment of the pulmonary arteries beyond the level of the main right and left pulmonary arteries due to suboptimal contrast bolus timing and respiratory motion.  2. No definite acute large central/saddle-type embolus.  Off of Elequis  Will restart Eliquis  at 5mg BID, low threshold to increase to treatment dose.      Intermittent asthma  Stable      Anxiety        Trigeminal neuralgia  Complains of mild pain      Iron deficiency anemia  Patient's anemia is currently controlled. Has not received any PRBCs to date. Etiology likely d/t chronic blood loss  Current CBC reviewed-   Lab Results   Component Value Date    HGB 12.1 06/26/2024    HCT 40 06/26/2024     Monitor serial CBC and transfuse if patient becomes hemodynamically unstable, symptomatic or H/H drops below 7/21.      VTE Risk Mitigation (From admission, onward)           Ordered     apixaban tablet 5 mg  2 times daily         06/26/24 1846     IP VTE HIGH RISK PATIENT  Once         06/26/24 1846     Place sequential compression device  Until discontinued         06/26/24 1846                    Discharge Planning   ALYSE: 7/1/2024     Code Status: Full Code   Is the patient medically ready for discharge?:     Reason for patient still in hospital (select all that apply): Patient trending condition  Discharge Plan A: Home with family   Discharge Delays: None known at this time              Vijaya Acosta MD  Department of Hospital Medicine   [facility] - Stepdown Flex ([place]

## 2024-06-30 NOTE — SUBJECTIVE & OBJECTIVE
Interval History:   No acute events overnight   Afebrile  Blood cultures with 4/4 Staph epidermidis - same sensitivities  Repeat blood cultures are no growth to date  Patient very lethargic this am - per nurse has been getting dilaudid, morphine and benadryl.  Primary team is aware but Secure Chat sent as well.  Awakens to open eyes and say yes to noxious stimuli.    Review of Systems   Unable to perform ROS: Other (sedation due to pain meds)     Objective:     Vital Signs (Most Recent):  Temp: 98.3 °F (36.8 °C) (06/30/24 0756)  Pulse: 81 (06/30/24 0756)  Resp: 20 (06/30/24 0756)  BP: (!) 137/95 (06/30/24 0756)  SpO2: 95 % (06/30/24 0756) Vital Signs (24h Range):  Temp:  [98.3 °F (36.8 °C)-98.9 °F (37.2 °C)] 98.3 °F (36.8 °C)  Pulse:  [72-87] 81  Resp:  [13-20] 20  SpO2:  [95 %-99 %] 95 %  BP: ()/(50-95) 137/95     Weight: 86.2 kg (190 lb 0.6 oz)  Body mass index is 34.76 kg/m².    Estimated Creatinine Clearance: 71.6 mL/min (based on SCr of 1 mg/dL).     Physical Exam  Constitutional:       General: She is not in acute distress.     Appearance: She is well-developed. She is obese. She is not ill-appearing, toxic-appearing or diaphoretic.       HENT:      Head: Normocephalic and atraumatic.   Cardiovascular:      Rate and Rhythm: Normal rate and regular rhythm.      Heart sounds: Normal heart sounds. No murmur heard.     No friction rub. No gallop.   Pulmonary:      Effort: Pulmonary effort is normal. No respiratory distress.      Breath sounds: Normal breath sounds. No wheezing or rales.   Abdominal:      General: Bowel sounds are normal. There is no distension.      Palpations: Abdomen is soft. There is no mass.      Tenderness: There is no abdominal tenderness. There is no guarding or rebound.   Skin:     General: Skin is warm and dry.   Neurological:      Mental Status: She is alert and oriented to person, place, and time.   Psychiatric:         Behavior: Behavior normal.          Significant Labs: Blood  "Culture:   Recent Labs   Lab 06/26/24  1346 06/27/24  1532 06/27/24  1544   LABBLOO Gram stain kimberly bottle: Gram positive cocci in clusters resembling Staph  Results called to and read back by: Eileen Soler LPN 06/27/2024  09:34  Gram stain aer bottle: Gram positive cocci in clusters resembling Staph  Positive results previously called 06/27/2024  11:27  STAPHYLOCOCCUS EPIDERMIDIS*  Gram stain aer bottle: Gram positive cocci in clusters resembling Staph  Results called to and read back by: Eileen Soler LPN 06/27/2024  09:32  Gram stain kimberly bottle: Gram positive cocci in clusters resembling Staph  Positive results previously called 06/27/2024  11:26  STAPHYLOCOCCUS EPIDERMIDIS* No Growth to date  No Growth to date  No Growth to date No Growth to date  No Growth to date  No Growth to date     CBC: No results for input(s): "WBC", "HGB", "HCT", "PLT" in the last 48 hours.  CMP:   Recent Labs   Lab 06/28/24  0959      K 4.4      CO2 23   GLU 80   BUN 11   CREATININE 1.0   CALCIUM 8.7   ANIONGAP 8     Wound Culture: No results for input(s): "LABAERO" in the last 4320 hours.  All pertinent labs within the past 24 hours have been reviewed.    Significant Imaging: I have reviewed all pertinent imaging results/findings within the past 24 hours.  Procedure Component Value Units Date/Time   X-Ray Chest PA And Lateral [[id number]] Resulted: 06/26/24 1502   Order Status: Completed Updated: 06/26/24 1505   Narrative:     EXAMINATION:  XR CHEST PA AND LATERAL    CLINICAL HISTORY:  Chest Pain;    TECHNIQUE:  PA and lateral views of the chest were performed.    COMPARISON:  02/27/2021    FINDINGS:  Cardiac size is normal and aorta is tortuous.  Vascular catheters are seen with the tip in the superior vena cava.  Lungs are clear and well expanded with no infiltrate or pneumothorax.   Impression:       See above      Electronically signed by: Luis Manuel Reed MD  Date: 06/26/2024  Time: 15:02   CTA Chest " Non-Coronary (PE Studies) [[id number]] Resulted: 06/26/24 1445   Order Status: Completed Updated: 06/26/24 1447   Narrative:     EXAMINATION:  CTA CHEST NON CORONARY (PE STUDIES)    CLINICAL HISTORY:  Pulmonary embolism (PE) suspected, high prob;    TECHNIQUE:  Low dose axial images, sagittal and coronal reformations were obtained from the thoracic inlet to the lung bases following the IV administration of 100 mL of Omnipaque 350.  Contrast timing was optimized to evaluate the pulmonary arteries.  MIP images were performed.    COMPARISON:  CTA chest 06/08/2020.    FINDINGS:  Exam quality: Examination is essentially nondiagnostic beyond the levels of the main right and left pulmonary arteries due to suboptimal contrast bolus timing and respiratory motion.    Pulmonary embolism: No convincing evidence of acute large central/saddle-type embolus.    Central pulmonary arteries: Normal caliber.    Aorta: Left-sided arch.  Normal caliber.  Incidental note is made of anatomic variant aberrant right subclavian artery, with retroesophageal course.    Heart: No right heart strain. Normal size.    Coronary arteries: No calcifications.    Pericardium: Normal. No effusion, thickening, or calcification.    Support tubes and lines: Right internal jugular approach port catheter.    Base of neck/thyroid: Normal.    Lymph nodes: No supraclavicular, axillary, internal mammary, mediastinal, or hilar adenopathy.    Esophagus: Normal.    Pleura: No effusion, thickening, or calcification.    Upper abdomen: Ill-defined patchy enhancement noted in the right posterior hepatic lobe (for example as seen on axial series 2, image 369).  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    Body wall: Unremarkable    Airways: Central airways grossly patent.    Lungs: Assessment compromised by respiratory motion.  Noting this, no definite acute appearing focal or diffuse process is appreciated.    Bones: No definite acute abnormality.    Impression:       1. Examination is essentially nondiagnostic for assessment of the pulmonary arteries beyond the level of the main right and left pulmonary arteries due to suboptimal contrast bolus timing and respiratory motion.  2. No definite acute large central/saddle-type embolus.  3. No definite acute intrathoracic findings.  4. Additional details, as provided in the body of report.      Electronically signed by: Reggie Rodrigues  Date: 06/26/2024  Time: 14:45      Imaging History     2024    Date Procedure Name Study Review Link PACS Link Status Accession Number Location   06/28/24 05:55 PM Cardiac monitoring strips Study Review  Final     06/28/24 05:55 PM Cardiac monitoring strips Study Review  Final     06/27/24 06:46 PM Cardiac monitoring strips Study Review  Final     06/27/24 02:57 PM Echo Study Review  Images Final [id number] [facility]   06/26/24 03:00 PM X-Ray Chest PA And Lateral Study Review  Images Final [id number] [facility]   06/26/24 02:15 PM CTA Chest Non-Coronary (PE Studies) Study Review  Images Final [id number] [facility]

## 2024-06-30 NOTE — PROGRESS NOTES
[facility] ([place])  Infectious Disease  Progress Note    Patient Name: Nazanin Malone  MRN: [id number]  Admission Date: 6/26/2024  Length of Stay: 3 days  Attending Physician: Vijaya Acosta MD  Primary Care Provider: Pee Montgomery MD    Isolation Status: No active isolations  Assessment/Plan:      ID  Bacteremia  44-year-old female with sickle cell disease admitted with chest pain and leg pain as well as lower back pain which is typical of her sickle cell and found to have GPC bacteremia.  She had a port replaced on the left side fairly recently after an infection and migration of port on L side.  She has had discomfort and itching at the new L sided port site which is unusual and atypical for her.  This is concerning especially in the setting of bacteremia.  TTE has no mention of vegetations.  She is afebrile and white blood cell count is normal.  She is on vancomycin.  RIP in process.    Blood cultures 4/4 Staph Epi - same sensitivities.  Repeats NGTD.  Patient today further details that she lived in [place] and while there had a infection in the left port as well as it had migrated and it was removed and replaced after a couple of days of removal.  She does not remember taking any significant amount of antibiotics.  She denies that she has had any problems on the right port in her chest.  She says that after the left side was replaced she had ongoing intermittent fevers and night sweats.     6.30:  Very lethargic due to pain meds.  Primary team aware but Secure Chat sent re: patients lethargy and rec of decreasing pain meds.  Primary team acknowledged.  RIP negative    Plan:   1. Continue vancomycin  2. Follow-up repeat blood culture results.  3.. Suspect may need port removal of both ports due to infection as unsure how was treated in past, has discomfort at L port, and has bacteremia of unclear source.  This would allow for definitive treatment as will need clean port access  ongoing in the future.  4. Rec decrease pain meds  5. Will follow          Anticipated Disposition: tbd    Thank you for your consult. I will follow-up with patient. Please contact us if you have any additional questions.    IBETH Peralta  Infectious Disease  [facility] ([place])    Subjective:     Principal Problem:Sickle-cell disease with pain    HPI: 44-year-old female with a history of acute chest syndrome, sickle cell disease with beta thalassemia, asthma, pulmonary embolism in the past but has been off Eliquis secondary to insurance issues, depression, hypertension, morbid obesity, trigeminal neuralgia.  She presented to the ED with chest pain as well as leg pain x3 days.  Of note she has a port in her right chest that she uses for exchange transfusion.  She reports having an infection in a port previously and it was removed and replaced on the opposite side, the left.  Since it had been replaced she says she has had pain and itching there and something does not feel normal versus her prior ports.  Blood cultures were drawn on 06/26 and show 4/4 bottles GPC resembling staph with repeat that is no growth to date.  PCR shows non staph aureus non MRSA.  Chest x-ray shows lungs are clear.  CTA done and and was essentially nondiagnostic due to suboptimal bolus timing but no definite large acute central/saddle embolus was noted.  She has had a TTE without mention of vegetation.  She has been treated with vancomycin.  She has been afebrile since admit, white blood cell count is normal.  Patient denies any recent or current fevers, chills, or sweats.  Id is consulted for antibiotic recs.  Interval History:   No acute events overnight   Afebrile  Blood cultures with 4/4 Staph epidermidis - same sensitivities  Repeat blood cultures are no growth to date  Patient very lethargic this am - per nurse has been getting dilaudid, morphine and benadryl.  Primary team is aware but Secure Chat sent as  well.  Awakens to open eyes and say yes to noxious stimuli.    Review of Systems   Unable to perform ROS: Other (sedation due to pain meds)     Objective:     Vital Signs (Most Recent):  Temp: 98.3 °F (36.8 °C) (06/30/24 0756)  Pulse: 81 (06/30/24 0756)  Resp: 20 (06/30/24 0756)  BP: (!) 137/95 (06/30/24 0756)  SpO2: 95 % (06/30/24 0756) Vital Signs (24h Range):  Temp:  [98.3 °F (36.8 °C)-98.9 °F (37.2 °C)] 98.3 °F (36.8 °C)  Pulse:  [72-87] 81  Resp:  [13-20] 20  SpO2:  [95 %-99 %] 95 %  BP: ()/(50-95) 137/95     Weight: 86.2 kg (190 lb 0.6 oz)  Body mass index is 34.76 kg/m².    Estimated Creatinine Clearance: 71.6 mL/min (based on SCr of 1 mg/dL).     Physical Exam  Constitutional:       General: She is not in acute distress.     Appearance: She is well-developed. She is obese. She is not ill-appearing, toxic-appearing or diaphoretic.       HENT:      Head: Normocephalic and atraumatic.   Cardiovascular:      Rate and Rhythm: Normal rate and regular rhythm.      Heart sounds: Normal heart sounds. No murmur heard.     No friction rub. No gallop.   Pulmonary:      Effort: Pulmonary effort is normal. No respiratory distress.      Breath sounds: Normal breath sounds. No wheezing or rales.   Abdominal:      General: Bowel sounds are normal. There is no distension.      Palpations: Abdomen is soft. There is no mass.      Tenderness: There is no abdominal tenderness. There is no guarding or rebound.   Skin:     General: Skin is warm and dry.   Neurological:      Mental Status: She is alert and oriented to person, place, and time.   Psychiatric:         Behavior: Behavior normal.          Significant Labs: Blood Culture:   Recent Labs   Lab 06/26/24  1346 06/27/24  1532 06/27/24  1544   LABBLOO Gram stain kimberly bottle: Gram positive cocci in clusters resembling Staph  Results called to and read back by: Eileen Soler LPN 06/27/2024  09:34  Gram stain aer bottle: Gram positive cocci in clusters resembling Staph   "Positive results previously called 06/27/2024  11:27  STAPHYLOCOCCUS EPIDERMIDIS*  Gram stain aer bottle: Gram positive cocci in clusters resembling Staph  Results called to and read back by: Eileen Soler LPN 06/27/2024  09:32  Gram stain kimberly bottle: Gram positive cocci in clusters resembling Staph  Positive results previously called 06/27/2024  11:26  STAPHYLOCOCCUS EPIDERMIDIS* No Growth to date  No Growth to date  No Growth to date No Growth to date  No Growth to date  No Growth to date     CBC: No results for input(s): "WBC", "HGB", "HCT", "PLT" in the last 48 hours.  CMP:   Recent Labs   Lab 06/28/24  0959      K 4.4      CO2 23   GLU 80   BUN 11   CREATININE 1.0   CALCIUM 8.7   ANIONGAP 8     Wound Culture: No results for input(s): "LABAERO" in the last 4320 hours.  All pertinent labs within the past 24 hours have been reviewed.    Significant Imaging: I have reviewed all pertinent imaging results/findings within the past 24 hours.  Procedure Component Value Units Date/Time   X-Ray Chest PA And Lateral [[id number]] Resulted: 06/26/24 1502   Order Status: Completed Updated: 06/26/24 1505   Narrative:     EXAMINATION:  XR CHEST PA AND LATERAL    CLINICAL HISTORY:  Chest Pain;    TECHNIQUE:  PA and lateral views of the chest were performed.    COMPARISON:  02/27/2021    FINDINGS:  Cardiac size is normal and aorta is tortuous.  Vascular catheters are seen with the tip in the superior vena cava.  Lungs are clear and well expanded with no infiltrate or pneumothorax.   Impression:       See above      Electronically signed by: Luis Manuel Reed MD  Date: 06/26/2024  Time: 15:02   CTA Chest Non-Coronary (PE Studies) [[id number]] Resulted: 06/26/24 1445   Order Status: Completed Updated: 06/26/24 1447   Narrative:     EXAMINATION:  CTA CHEST NON CORONARY (PE STUDIES)    CLINICAL HISTORY:  Pulmonary embolism (PE) suspected, high prob;    TECHNIQUE:  Low dose axial images, sagittal and coronal " reformations were obtained from the thoracic inlet to the lung bases following the IV administration of 100 mL of Omnipaque 350.  Contrast timing was optimized to evaluate the pulmonary arteries.  MIP images were performed.    COMPARISON:  CTA chest 06/08/2020.    FINDINGS:  Exam quality: Examination is essentially nondiagnostic beyond the levels of the main right and left pulmonary arteries due to suboptimal contrast bolus timing and respiratory motion.    Pulmonary embolism: No convincing evidence of acute large central/saddle-type embolus.    Central pulmonary arteries: Normal caliber.    Aorta: Left-sided arch.  Normal caliber.  Incidental note is made of anatomic variant aberrant right subclavian artery, with retroesophageal course.    Heart: No right heart strain. Normal size.    Coronary arteries: No calcifications.    Pericardium: Normal. No effusion, thickening, or calcification.    Support tubes and lines: Right internal jugular approach port catheter.    Base of neck/thyroid: Normal.    Lymph nodes: No supraclavicular, axillary, internal mammary, mediastinal, or hilar adenopathy.    Esophagus: Normal.    Pleura: No effusion, thickening, or calcification.    Upper abdomen: Ill-defined patchy enhancement noted in the right posterior hepatic lobe (for example as seen on axial series 2, image 369).  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    Body wall: Unremarkable    Airways: Central airways grossly patent.    Lungs: Assessment compromised by respiratory motion.  Noting this, no definite acute appearing focal or diffuse process is appreciated.    Bones: No definite acute abnormality.   Impression:       1. Examination is essentially nondiagnostic for assessment of the pulmonary arteries beyond the level of the main right and left pulmonary arteries due to suboptimal contrast bolus timing and respiratory motion.  2. No definite acute large central/saddle-type embolus.  3. No definite acute  intrathoracic findings.  4. Additional details, as provided in the body of report.      Electronically signed by: Reggie Rodrigues  Date: 06/26/2024  Time: 14:45      Imaging History     2024    Date Procedure Name Study Review Link PACS Link Status Accession Number Location   06/28/24 05:55 PM Cardiac monitoring strips Study Review  Final     06/28/24 05:55 PM Cardiac monitoring strips Study Review  Final     06/27/24 06:46 PM Cardiac monitoring strips Study Review  Final     06/27/24 02:57 PM Echo Study Review  Images Final [id number] [facility]   06/26/24 03:00 PM X-Ray Chest PA And Lateral Study Review  Images Final [id number] [facility]   06/26/24 02:15 PM CTA Chest Non-Coronary (PE Studies) Study Review  Images Final [id number] [facility]

## 2024-06-30 NOTE — SUBJECTIVE & OBJECTIVE
Interval History:   6/30: Sleeping during rounds.     Review of Systems   Constitutional:  Positive for activity change.   HENT: Negative.     Respiratory:  Positive for apnea, chest tightness and shortness of breath.    Endocrine: Negative.    Genitourinary: Negative.    Musculoskeletal: Negative.    Allergic/Immunologic: Negative.    Neurological: Negative.    Hematological: Negative.      Objective:     Vital Signs (Most Recent):  Temp: 98.3 °F (36.8 °C) (06/30/24 0756)  Pulse: 84 (06/30/24 1110)  Resp: 20 (06/30/24 0921)  BP: (!) 137/95 (06/30/24 0756)  SpO2: 95 % (06/30/24 1110) Vital Signs (24h Range):  Temp:  [98.3 °F (36.8 °C)-98.9 °F (37.2 °C)] 98.3 °F (36.8 °C)  Pulse:  [72-87] 84  Resp:  [13-20] 20  SpO2:  [95 %-99 %] 95 %  BP: ()/(50-95) 137/95     Weight: 86.2 kg (190 lb 0.6 oz)  Body mass index is 34.76 kg/m².  No intake or output data in the 24 hours ending 06/30/24 1116      Physical Exam  Constitutional:       Appearance: She is ill-appearing.   HENT:      Head: Normocephalic.      Nose: Nose normal.      Mouth/Throat:      Mouth: Mucous membranes are moist.   Cardiovascular:      Rate and Rhythm: Normal rate.      Pulses: Normal pulses.   Pulmonary:      Effort: Pulmonary effort is normal.   Chest:      Chest wall: Tenderness present.   Abdominal:      General: Abdomen is flat.      Palpations: Abdomen is soft.   Musculoskeletal:         General: Normal range of motion.      Cervical back: Normal range of motion.      Comments: Scar on the right lower extremity related to fasciotomy.    Skin:     General: Skin is warm.   Neurological:      General: No focal deficit present.             Significant Labs: All pertinent labs within the past 24 hours have been reviewed.    Significant Imaging: I have reviewed all pertinent imaging results/findings within the past 24 hours.

## 2024-06-30 NOTE — ASSESSMENT & PLAN NOTE
44-year-old female with sickle cell disease admitted with chest pain and leg pain as well as lower back pain which is typical of her sickle cell and found to have GPC bacteremia.  She had a port replaced on the left side fairly recently after an infection and migration of port on L side.  She has had discomfort and itching at the new L sided port site which is unusual and atypical for her.  This is concerning especially in the setting of bacteremia.  TTE has no mention of vegetations.  She is afebrile and white blood cell count is normal.  She is on vancomycin.  RIP in process.    Blood cultures 4/4 Staph Epi - same sensitivities.  Repeats NGTD.  Patient today further details that she lived in [place] and while there had a infection in the left port as well as it had migrated and it was removed and replaced after a couple of days of removal.  She does not remember taking any significant amount of antibiotics.  She denies that she has had any problems on the right port in her chest.  She says that after the left side was replaced she had ongoing intermittent fevers and night sweats.     6.30:  Very lethargic due to pain meds.  Primary team aware but Secure Chat sent re: patients lethargy and rec of decreasing pain meds.  Primary team acknowledged.  RIP negative    Plan:   1. Continue vancomycin  2. Follow-up repeat blood culture results.  3.. Suspect may need port removal of both ports due to infection as unsure how was treated in past, has discomfort at L port, and has bacteremia of unclear source.  This would allow for definitive treatment as will need clean port access ongoing in the future.  4. Rec decrease pain meds  5. Will follow

## 2024-07-01 ENCOUNTER — TELEPHONE (OUTPATIENT)
Dept: HEMATOLOGY/ONCOLOGY | Facility: CLINIC | Age: 44
End: 2024-07-01
Payer: MEDICARE

## 2024-07-01 LAB
ANION GAP SERPL CALC-SCNC: 10 MMOL/L (ref 8–16)
BASOPHILS # BLD AUTO: 0.04 K/UL (ref 0–0.2)
BASOPHILS NFR BLD: 0.6 % (ref 0–1.9)
BUN SERPL-MCNC: 9 MG/DL (ref 6–20)
CALCIUM SERPL-MCNC: 9.6 MG/DL (ref 8.7–10.5)
CHLORIDE SERPL-SCNC: 108 MMOL/L (ref 95–110)
CO2 SERPL-SCNC: 20 MMOL/L (ref 23–29)
CREAT SERPL-MCNC: 1 MG/DL (ref 0.5–1.4)
CREAT SERPL-MCNC: 1.1 MG/DL (ref 0.5–1.4)
DIFFERENTIAL METHOD BLD: ABNORMAL
EOSINOPHIL # BLD AUTO: 0.1 K/UL (ref 0–0.5)
EOSINOPHIL NFR BLD: 1.9 % (ref 0–8)
ERYTHROCYTE [DISTWIDTH] IN BLOOD BY AUTOMATED COUNT: 21.6 % (ref 11.5–14.5)
EST. GFR  (NO RACE VARIABLE): >60 ML/MIN/1.73 M^2
EST. GFR  (NO RACE VARIABLE): >60 ML/MIN/1.73 M^2
GLUCOSE SERPL-MCNC: 90 MG/DL (ref 70–110)
HCT VFR BLD AUTO: 35.7 % (ref 37–48.5)
HGB BLD-MCNC: 11.8 G/DL (ref 12–16)
IMM GRANULOCYTES # BLD AUTO: 0.01 K/UL (ref 0–0.04)
IMM GRANULOCYTES NFR BLD AUTO: 0.1 % (ref 0–0.5)
LYMPHOCYTES # BLD AUTO: 1.8 K/UL (ref 1–4.8)
LYMPHOCYTES NFR BLD: 24.9 % (ref 18–48)
MAGNESIUM SERPL-MCNC: 1.9 MG/DL (ref 1.6–2.6)
MCH RBC QN AUTO: 25.7 PG (ref 27–31)
MCHC RBC AUTO-ENTMCNC: 33.1 G/DL (ref 32–36)
MCV RBC AUTO: 78 FL (ref 82–98)
MONOCYTES # BLD AUTO: 0.7 K/UL (ref 0.3–1)
MONOCYTES NFR BLD: 9.9 % (ref 4–15)
NEUTROPHILS # BLD AUTO: 4.5 K/UL (ref 1.8–7.7)
NEUTROPHILS NFR BLD: 62.6 % (ref 38–73)
NRBC BLD-RTO: 0 /100 WBC
PLATELET # BLD AUTO: 270 K/UL (ref 150–450)
PMV BLD AUTO: 9.8 FL (ref 9.2–12.9)
POTASSIUM SERPL-SCNC: 4.3 MMOL/L (ref 3.5–5.1)
RBC # BLD AUTO: 4.6 M/UL (ref 4–5.4)
SODIUM SERPL-SCNC: 138 MMOL/L (ref 136–145)
VANCOMYCIN TROUGH SERPL-MCNC: 25.8 UG/ML (ref 10–22)
WBC # BLD AUTO: 7.19 K/UL (ref 3.9–12.7)

## 2024-07-01 PROCEDURE — 85025 COMPLETE CBC W/AUTO DIFF WBC: CPT | Performed by: PHYSICIAN ASSISTANT

## 2024-07-01 PROCEDURE — 83735 ASSAY OF MAGNESIUM: CPT | Performed by: PHYSICIAN ASSISTANT

## 2024-07-01 PROCEDURE — 94761 N-INVAS EAR/PLS OXIMETRY MLT: CPT

## 2024-07-01 PROCEDURE — 25000003 PHARM REV CODE 250: Performed by: PHYSICIAN ASSISTANT

## 2024-07-01 PROCEDURE — 36415 COLL VENOUS BLD VENIPUNCTURE: CPT | Performed by: HOSPITALIST

## 2024-07-01 PROCEDURE — 20600001 HC STEP DOWN PRIVATE ROOM

## 2024-07-01 PROCEDURE — 82565 ASSAY OF CREATININE: CPT | Performed by: HOSPITALIST

## 2024-07-01 PROCEDURE — 63600175 PHARM REV CODE 636 W HCPCS: Performed by: HOSPITALIST

## 2024-07-01 PROCEDURE — 80048 BASIC METABOLIC PNL TOTAL CA: CPT | Performed by: PHYSICIAN ASSISTANT

## 2024-07-01 PROCEDURE — 25000003 PHARM REV CODE 250: Performed by: HOSPITALIST

## 2024-07-01 PROCEDURE — 63600175 PHARM REV CODE 636 W HCPCS: Performed by: PHYSICIAN ASSISTANT

## 2024-07-01 PROCEDURE — 36415 COLL VENOUS BLD VENIPUNCTURE: CPT | Performed by: PHYSICIAN ASSISTANT

## 2024-07-01 PROCEDURE — 80202 ASSAY OF VANCOMYCIN: CPT | Performed by: HOSPITALIST

## 2024-07-01 PROCEDURE — 25000003 PHARM REV CODE 250: Performed by: INTERNAL MEDICINE

## 2024-07-01 RX ORDER — HYDROMORPHONE HYDROCHLORIDE 1 MG/ML
1 INJECTION, SOLUTION INTRAMUSCULAR; INTRAVENOUS; SUBCUTANEOUS ONCE
Status: COMPLETED | OUTPATIENT
Start: 2024-07-01 | End: 2024-07-01

## 2024-07-01 RX ORDER — LACTULOSE 10 G/15ML
30 SOLUTION ORAL 3 TIMES DAILY
Status: DISCONTINUED | OUTPATIENT
Start: 2024-07-01 | End: 2024-07-12 | Stop reason: HOSPADM

## 2024-07-01 RX ORDER — POLYETHYLENE GLYCOL 3350 17 G/17G
17 POWDER, FOR SOLUTION ORAL DAILY
Status: DISCONTINUED | OUTPATIENT
Start: 2024-07-01 | End: 2024-07-03

## 2024-07-01 RX ORDER — DIPHENHYDRAMINE HYDROCHLORIDE 50 MG/ML
25 INJECTION INTRAMUSCULAR; INTRAVENOUS EVERY 6 HOURS PRN
Status: DISCONTINUED | OUTPATIENT
Start: 2024-07-01 | End: 2024-07-02

## 2024-07-01 RX ORDER — HYDROMORPHONE HYDROCHLORIDE 1 MG/ML
2 INJECTION, SOLUTION INTRAMUSCULAR; INTRAVENOUS; SUBCUTANEOUS EVERY 6 HOURS PRN
Status: DISCONTINUED | OUTPATIENT
Start: 2024-07-01 | End: 2024-07-02

## 2024-07-01 RX ORDER — LACTULOSE 10 G/15ML
30 SOLUTION ORAL 2 TIMES DAILY PRN
Status: DISCONTINUED | OUTPATIENT
Start: 2024-07-01 | End: 2024-07-12 | Stop reason: HOSPADM

## 2024-07-01 RX ORDER — FUROSEMIDE 10 MG/ML
60 INJECTION INTRAMUSCULAR; INTRAVENOUS ONCE
Status: COMPLETED | OUTPATIENT
Start: 2024-07-01 | End: 2024-07-01

## 2024-07-01 RX ADMIN — FLUOXETINE HYDROCHLORIDE 40 MG: 20 CAPSULE ORAL at 08:07

## 2024-07-01 RX ADMIN — DIPHENHYDRAMINE HYDROCHLORIDE 25 MG: 25 CAPSULE ORAL at 10:07

## 2024-07-01 RX ADMIN — LACTULOSE 30 G: 20 SOLUTION ORAL at 03:07

## 2024-07-01 RX ADMIN — CARBAMAZEPINE 800 MG: 200 TABLET ORAL at 03:07

## 2024-07-01 RX ADMIN — POTASSIUM CHLORIDE 20 MEQ: 1500 TABLET, EXTENDED RELEASE ORAL at 08:07

## 2024-07-01 RX ADMIN — PREGABALIN 50 MG: 50 CAPSULE ORAL at 08:07

## 2024-07-01 RX ADMIN — AMLODIPINE BESYLATE 10 MG: 10 TABLET ORAL at 08:07

## 2024-07-01 RX ADMIN — VANCOMYCIN HYDROCHLORIDE 1000 MG: 1 INJECTION, POWDER, LYOPHILIZED, FOR SOLUTION INTRAVENOUS at 03:07

## 2024-07-01 RX ADMIN — CARBAMAZEPINE 800 MG: 200 TABLET ORAL at 09:07

## 2024-07-01 RX ADMIN — OXYCODONE HYDROCHLORIDE AND ACETAMINOPHEN 1 TABLET: 10; 325 TABLET ORAL at 06:07

## 2024-07-01 RX ADMIN — HYDROMORPHONE HYDROCHLORIDE 1 MG: 1 INJECTION, SOLUTION INTRAMUSCULAR; INTRAVENOUS; SUBCUTANEOUS at 12:07

## 2024-07-01 RX ADMIN — HYDROMORPHONE HYDROCHLORIDE 2 MG: 1 INJECTION, SOLUTION INTRAMUSCULAR; INTRAVENOUS; SUBCUTANEOUS at 09:07

## 2024-07-01 RX ADMIN — POLYETHYLENE GLYCOL 3350 17 G: 17 POWDER, FOR SOLUTION ORAL at 12:07

## 2024-07-01 RX ADMIN — GABAPENTIN 800 MG: 400 CAPSULE ORAL at 03:07

## 2024-07-01 RX ADMIN — APIXABAN 5 MG: 5 TABLET, FILM COATED ORAL at 09:07

## 2024-07-01 RX ADMIN — LACTULOSE 30 G: 20 SOLUTION ORAL at 09:07

## 2024-07-01 RX ADMIN — MORPHINE SULFATE 15 MG: 15 TABLET, EXTENDED RELEASE ORAL at 09:07

## 2024-07-01 RX ADMIN — GABAPENTIN 800 MG: 400 CAPSULE ORAL at 08:07

## 2024-07-01 RX ADMIN — DIPHENHYDRAMINE HYDROCHLORIDE 25 MG: 50 INJECTION, SOLUTION INTRAMUSCULAR; INTRAVENOUS at 09:07

## 2024-07-01 RX ADMIN — OXYCODONE HYDROCHLORIDE AND ACETAMINOPHEN 1 TABLET: 10; 325 TABLET ORAL at 11:07

## 2024-07-01 RX ADMIN — APIXABAN 5 MG: 5 TABLET, FILM COATED ORAL at 08:07

## 2024-07-01 RX ADMIN — DIPHENHYDRAMINE HYDROCHLORIDE 25 MG: 50 INJECTION, SOLUTION INTRAMUSCULAR; INTRAVENOUS at 03:07

## 2024-07-01 RX ADMIN — HYDROMORPHONE HYDROCHLORIDE 1 MG: 1 INJECTION, SOLUTION INTRAMUSCULAR; INTRAVENOUS; SUBCUTANEOUS at 10:07

## 2024-07-01 RX ADMIN — HYDROMORPHONE HYDROCHLORIDE 1 MG: 1 INJECTION, SOLUTION INTRAMUSCULAR; INTRAVENOUS; SUBCUTANEOUS at 04:07

## 2024-07-01 RX ADMIN — MORPHINE SULFATE 15 MG: 15 TABLET, EXTENDED RELEASE ORAL at 08:07

## 2024-07-01 RX ADMIN — HYDROMORPHONE HYDROCHLORIDE 2 MG: 1 INJECTION, SOLUTION INTRAMUSCULAR; INTRAVENOUS; SUBCUTANEOUS at 03:07

## 2024-07-01 RX ADMIN — PREGABALIN 50 MG: 50 CAPSULE ORAL at 09:07

## 2024-07-01 RX ADMIN — FUROSEMIDE 60 MG: 10 INJECTION, SOLUTION INTRAMUSCULAR; INTRAVENOUS at 03:07

## 2024-07-01 RX ADMIN — CARVEDILOL 25 MG: 25 TABLET, FILM COATED ORAL at 08:07

## 2024-07-01 RX ADMIN — GABAPENTIN 800 MG: 400 CAPSULE ORAL at 09:07

## 2024-07-01 RX ADMIN — TIZANIDINE 4 MG: 4 TABLET ORAL at 06:07

## 2024-07-01 NOTE — PROGRESS NOTES
[facility] ([address] Medicine  Progress Note    Patient Name: Nazanin Malone  MRN: [id number]  Patient Class: IP- Inpatient   Admission Date: 6/26/2024  Length of Stay: 4 days  Attending Physician: Vijaya Acosta MD  Primary Care Provider: Pee Montgomery MD        Subjective:     Principal Problem:Sickle-cell disease with pain        HPI:  44 y.o. female presenting with chest pain and leg pain for about 3 days.  History of acute chest syndrome secondary to sickle cell anemia.  History of sickle cell beta thalassemia.  Has had multiple acute chest syndrome, pneumonia.  Also history of pulmonary embolism but has been off of her Eliquis for several months due to insurance related issues.  Does not take folic acid or hydroxyurea either.  Has a port in the right side of her chest, history of needing exchange transfusions.  Fairly significant history of acute chest syndrome, rhabdomyolysis resulting in fasciotomy of the right lower extremity, just recently as of last year October 20, 2023.  Recently moved here from [place], drove here 3 days ago.  No new leg swelling or muscle pains.    Overview/Hospital Course:  No notes on file    Interval History:   7/1: Notes that she feels a bit better today.     Review of Systems   Constitutional:  Positive for activity change.   HENT: Negative.     Respiratory:  Positive for apnea, chest tightness and shortness of breath.    Endocrine: Negative.    Genitourinary: Negative.    Musculoskeletal: Negative.    Allergic/Immunologic: Negative.    Neurological: Negative.    Hematological: Negative.      Objective:     Vital Signs (Most Recent):  Temp: 98.4 °F (36.9 °C) (07/01/24 1539)  Pulse: 83 (07/01/24 1539)  Resp: 18 (07/01/24 1539)  BP: 126/86 (07/01/24 1539)  SpO2: 98 % (07/01/24 1504) Vital Signs (24h Range):  Temp:  [97.9 °F (36.6 °C)-98.6 °F (37 °C)] 98.4 °F (36.9 °C)  Pulse:  [74-83] 83  Resp:  [14-24] 18  SpO2:  [97 %-100 %] 98 %  BP:  (126-197)/() 126/86     Weight: 86.2 kg (190 lb 0.6 oz)  Body mass index is 34.76 kg/m².    Intake/Output Summary (Last 24 hours) at 7/1/2024 1729  Last data filed at 7/1/2024 1230  Gross per 24 hour   Intake --   Output 1450 ml   Net -1450 ml         Physical Exam  Constitutional:       Appearance: She is ill-appearing.   HENT:      Head: Normocephalic.      Nose: Nose normal.      Mouth/Throat:      Mouth: Mucous membranes are moist.   Cardiovascular:      Rate and Rhythm: Normal rate.      Pulses: Normal pulses.   Pulmonary:      Effort: Pulmonary effort is normal.   Chest:      Chest wall: Tenderness present.   Abdominal:      General: Abdomen is flat.      Palpations: Abdomen is soft.   Musculoskeletal:         General: Normal range of motion.      Cervical back: Normal range of motion.      Comments: Scar on the right lower extremity related to fasciotomy.    Skin:     General: Skin is warm.   Neurological:      General: No focal deficit present.             Significant Labs: All pertinent labs within the past 24 hours have been reviewed.    Significant Imaging: I have reviewed all pertinent imaging results/findings within the past 24 hours.    Assessment/Plan:      * Sickle-cell disease with pain  History of Acute Chest Syndrome and Pulmonary Embolisim  IVF for now  Dilaudid 2mg q4 prn  Has not been seen here in three years, will consult hem onc to reestablish care      Hypokalemia  Patient has hypokalemia which is Acute and currently uncontrolled. Most recent potassium levels reviewed-   Lab Results   Component Value Date    K 3.0 (L) 06/26/2024   . Will continue potassium replacement per protocol and recheck repeat levels after replacement completed.     History of pulmonary embolism  CTA today: 1. Examination is essentially nondiagnostic for assessment of the pulmonary arteries beyond the level of the main right and left pulmonary arteries due to suboptimal contrast bolus timing and respiratory  motion.  2. No definite acute large central/saddle-type embolus.  Off of Elequis  Will restart Eliquis at 5mg BID, low threshold to increase to treatment dose.      Intermittent asthma  Stable      Anxiety        Trigeminal neuralgia  Complains of mild pain      Iron deficiency anemia  Patient's anemia is currently controlled. Has not received any PRBCs to date. Etiology likely d/t chronic blood loss  Current CBC reviewed-   Lab Results   Component Value Date    HGB 12.1 06/26/2024    HCT 40 06/26/2024     Monitor serial CBC and transfuse if patient becomes hemodynamically unstable, symptomatic or H/H drops below 7/21.      VTE Risk Mitigation (From admission, onward)           Ordered     apixaban tablet 5 mg  2 times daily         06/26/24 1846     IP VTE HIGH RISK PATIENT  Once         06/26/24 1846     Place sequential compression device  Until discontinued         06/26/24 1846                    Discharge Planning   ALYSE: 7/3/2024     Code Status: Full Code   Is the patient medically ready for discharge?:     Reason for patient still in hospital (select all that apply): Patient trending condition  Discharge Plan A: Home with family   Discharge Delays: None known at this time              Vijaya Acosta MD  Department of Hospital Medicine   [facility] ([place])

## 2024-07-01 NOTE — TELEPHONE ENCOUNTER
Called and spoke to  Patient       9/5 Appointment rescheduled to 9/23 .  All questions and concerns addressed. Provided my name and direct number and instructed Patient  to call with any questions and/or concerns.     Thanks,  THERESA AyoubN, RN-BC  BMT Nurse Navigator  [facility] [address], [place], [place] [address]  _________________________  o [phone]

## 2024-07-01 NOTE — PLAN OF CARE
Unit [facility] Care Support Interaction      I have reviewed the chart of Naznain Malone who is hospitalized for Sickle-cell disease with pain. The patient is currently located in the following unit: 14W        I have assisted the primary physician in management of the following:    Morning labs ordered         Lois Bose PA-C  Unit Based [facility]

## 2024-07-01 NOTE — PROGRESS NOTES
Pharmacokinetic Assessment Follow Up: IV Vancomycin    Vancomycin serum concentration assessment(s):    The trough level was drawn correctly and can be used to guide therapy at this time. The measurement is above the desired definitive target range of 15 to 20 mcg/mL.    Vancomycin Regimen Plan:    Hold dose tonight, get level tomorrow with AM labs. Change regimen to Vancomycin 750 mg IV every 12 hours with next random level measured on 7/2 at 0500    Drug levels (last 3 results):  Recent Labs   Lab Result Units 06/29/24  1417 07/01/24  1425   Vancomycin-Trough ug/mL 12.9 25.8*       Pharmacy will continue to follow and monitor vancomycin.    Please contact pharmacy at extension [phone] for questions regarding this assessment.    Thank you for the consult,   Zarina Sutherland, PharmD       Patient brief summary:  Nazanin Malone is a 44 y.o. female initiated on antimicrobial therapy with IV Vancomycin for treatment of bacteremia      Drug Allergies:   Review of patient's allergies indicates:  No Known Allergies    Actual Body Weight:   86.2 kg    Renal Function:   Estimated Creatinine Clearance: 65.1 mL/min (based on SCr of 1.1 mg/dL).,     Dialysis Method (if applicable):  N/A    CBC (last 72 hours):  Recent Labs   Lab Result Units 07/01/24  0914   WBC K/uL 7.19   Hemoglobin g/dL 11.8*   Hematocrit % 35.7*   Platelets K/uL 270   Gran % % 62.6   Lymph % % 24.9   Mono % % 9.9   Eosinophil % % 1.9   Basophil % % 0.6   Differential Method  Automated       Metabolic Panel (last 72 hours):  Recent Labs   Lab Result Units 07/01/24  0914 07/01/24  1425   Sodium mmol/L 138  --    Potassium mmol/L 4.3  --    Chloride mmol/L 108  --    CO2 mmol/L 20*  --    Glucose mg/dL 90  --    BUN mg/dL 9  --    Creatinine mg/dL 1.0 1.1   Magnesium mg/dL 1.9  --        Vancomycin Administrations:  vancomycin given in the last 96 hours                     vancomycin (VANCOCIN) 1,000 mg in D5W 250 mL IVPB (Vial-Mate) (mg) 1,000 mg New Bag  07/01/24 0356     1,000 mg New Bag 06/30/24 1600     1,000 mg New Bag  0354    vancomycin 1,250 mg in D5W 250 mL IVPB (Vial-Mate) (mg) 1,250 mg New Bag 06/29/24 1542     1,250 mg New Bag 06/28/24 1558                    Microbiologic Results:  Microbiology Results (last 7 days)       Procedure Component Value Units Date/Time    Blood culture [[id number]] Collected: 06/27/24 1544    Order Status: Completed Specimen: Blood Updated: 06/30/24 1812     Blood Culture, Routine No Growth to date      No Growth to date      No Growth to date      No Growth to date    Narrative:      Collection has been rescheduled by DF2 at 06/27/2024 14:22 Reason:   Patient unavailable.  Eileen RN ext [phone].  Collection has been rescheduled by DF2 at 06/27/2024 14:22 Reason:   Patient unavailable.  Eileen RN ext [phone].    Blood culture [[id number]] Collected: 06/27/24 1532    Order Status: Completed Specimen: Blood Updated: 06/30/24 1812     Blood Culture, Routine No Growth to date      No Growth to date      No Growth to date      No Growth to date    Narrative:      Collection has been rescheduled by DF2 at 06/27/2024 14:22 Reason:   Patient unavailable.  Eileen RN ext [phone].  Collection has been rescheduled by DF2 at 06/27/2024 14:22 Reason:   Patient unavailable.  Eileen RN ext [phone].    Respiratory Infection Panel (PCR), Nasopharyngeal [[id number]] Collected: 06/29/24 1030    Order Status: Completed Specimen: Nasopharyngeal Swab Updated: 06/30/24 0026     Respiratory Infection Panel Source NP Swab     Adenovirus Not Detected     Coronavirus 229E, Common Cold Virus Not Detected     Coronavirus HKU1, Common Cold Virus Not Detected     Coronavirus NL63, Common Cold Virus Not Detected     Coronavirus OC43, Common Cold Virus Not Detected     Comment: The Coronavirus strains detected in this test cause the common cold.  These strains are not the COVID-19 (novel Coronavirus)strain   associated with the respiratory disease outbreak.          SARS-CoV2  (COVID-19) Qualitative PCR Not Detected     Human Metapneumovirus Not Detected     Human Rhinovirus/Enterovirus Not Detected     Influenza A (subtypes H1, H1-2009,H3) Not Detected     Influenza B Not Detected     Parainfluenza Virus 1 Not Detected     Parainfluenza Virus 2 Not Detected     Parainfluenza Virus 3 Not Detected     Parainfluenza Virus 4 Not Detected     Respiratory Syncytial Virus Not Detected     Bordetella Parapertussis (ZL9628) Not Detected     Bordetella pertussis (ptxP) Not Detected     Chlamydia pneumoniae Not Detected     Mycoplasma pneumoniae Not Detected    Narrative:      Assay not valid for lower respiratory specimens, alternate  testing required.    Blood culture #2 **CANNOT BE ORDERED STAT** [[id number]]  (Abnormal)  (Susceptibility) Collected: 06/26/24 1346    Order Status: Completed Specimen: Blood from Peripheral, Antecubital, Left Updated: 06/29/24 1041     Blood Culture, Routine Gram stain kimberly bottle: Gram positive cocci in clusters resembling Staph      Results called to and read back by: Eileen Soler LPN 06/27/2024  09:34      Gram stain aer bottle: Gram positive cocci in clusters resembling Staph      Positive results previously called 06/27/2024  11:27      STAPHYLOCOCCUS EPIDERMIDIS    Blood culture #1 **CANNOT BE ORDERED STAT** [[id number]]  (Abnormal)  (Susceptibility) Collected: 06/26/24 1346    Order Status: Completed Specimen: Blood from Peripheral, Antecubital, Right Updated: 06/29/24 1040     Blood Culture, Routine Gram stain aer bottle: Gram positive cocci in clusters resembling Staph      Results called to and read back by: Eileen Soler LPN 06/27/2024  09:32      Gram stain kimberly bottle: Gram positive cocci in clusters resembling Staph      Positive results previously called 06/27/2024  11:26      STAPHYLOCOCCUS EPIDERMIDIS    Respiratory Infection Panel (PCR), Nasopharyngeal [[id number]] Collected: 06/29/24 0802    Order Status: Canceled Specimen: Nasopharyngeal Swab      MRSA/SA Rapid ID by PCR from Blood culture [[id number]] Collected: 06/26/24 1346    Order Status: Completed Updated: 06/27/24 1111     Staph aureus ID by PCR Negative     Methicillin Resistant ID by PCR Negative

## 2024-07-01 NOTE — SUBJECTIVE & OBJECTIVE
Interval History:   7/1: Notes that she feels a bit better today.     Review of Systems   Constitutional:  Positive for activity change.   HENT: Negative.     Respiratory:  Positive for apnea, chest tightness and shortness of breath.    Endocrine: Negative.    Genitourinary: Negative.    Musculoskeletal: Negative.    Allergic/Immunologic: Negative.    Neurological: Negative.    Hematological: Negative.      Objective:     Vital Signs (Most Recent):  Temp: 98.4 °F (36.9 °C) (07/01/24 1539)  Pulse: 83 (07/01/24 1539)  Resp: 18 (07/01/24 1539)  BP: 126/86 (07/01/24 1539)  SpO2: 98 % (07/01/24 1504) Vital Signs (24h Range):  Temp:  [97.9 °F (36.6 °C)-98.6 °F (37 °C)] 98.4 °F (36.9 °C)  Pulse:  [74-83] 83  Resp:  [14-24] 18  SpO2:  [97 %-100 %] 98 %  BP: (126-197)/() 126/86     Weight: 86.2 kg (190 lb 0.6 oz)  Body mass index is 34.76 kg/m².    Intake/Output Summary (Last 24 hours) at 7/1/2024 1729  Last data filed at 7/1/2024 1230  Gross per 24 hour   Intake --   Output 1450 ml   Net -1450 ml         Physical Exam  Constitutional:       Appearance: She is ill-appearing.   HENT:      Head: Normocephalic.      Nose: Nose normal.      Mouth/Throat:      Mouth: Mucous membranes are moist.   Cardiovascular:      Rate and Rhythm: Normal rate.      Pulses: Normal pulses.   Pulmonary:      Effort: Pulmonary effort is normal.   Chest:      Chest wall: Tenderness present.   Abdominal:      General: Abdomen is flat.      Palpations: Abdomen is soft.   Musculoskeletal:         General: Normal range of motion.      Cervical back: Normal range of motion.      Comments: Scar on the right lower extremity related to fasciotomy.    Skin:     General: Skin is warm.   Neurological:      General: No focal deficit present.             Significant Labs: All pertinent labs within the past 24 hours have been reviewed.    Significant Imaging: I have reviewed all pertinent imaging results/findings within the past 24 hours.

## 2024-07-01 NOTE — PLAN OF CARE
Discharge Plan A and Plan B have been determined by review of patient's clinical status, future medical and therapeutic needs, and coverage/benefits for post-acute care in coordination with multidisciplinary team members.    07/01/24 1346   Discharge Reassessment   Assessment Type Discharge Planning Reassessment   Discharge Plan discussed with: Patient   Communicated ALYSE with patient/caregiver Yes   Discharge Plan A Home with family   Discharge Plan B Home   DME Needed Upon Discharge  none   Transition of Care Barriers None   Why the patient remains in the hospital Requires continued medical care   Post-Acute Status   Post-Acute Authorization Other   Other Status Community Services  (Care at home referral needed)   Hospital Resources/Appts/Education Provided Appointments scheduled and added to AVS   Discharge Delays None known at this time     CM met with patient to discuss discharge planning.  Patients plan is to return home with family.   ALYSE: 7/3/24      Discharge Recommendations: NA    Discharge Equipment Recommendations: NA    Barriers to discharge: None             TREATMENT PLANS:   Plan:   1. Continue vancomycin  2. Follow-up repeat blood culture results.  3.. Suspect may need port removal of both ports due to infection as unsure how was treated in past, has discomfort at L port, and has bacteremia of unclear source.  This would allow for definitive treatment as will need clean port access ongoing in the future    Follow up:  PCP      Referrals: Hematology , Pain Management    Aleida Wise RN  Case Management  [facility]  [phone]

## 2024-07-02 PROBLEM — Z91.89: Status: ACTIVE | Noted: 2024-07-02

## 2024-07-02 PROBLEM — N17.9 AKI (ACUTE KIDNEY INJURY): Status: ACTIVE | Noted: 2024-07-02

## 2024-07-02 PROBLEM — G93.40 ENCEPHALOPATHY: Status: ACTIVE | Noted: 2024-07-02

## 2024-07-02 LAB
ALBUMIN SERPL BCP-MCNC: 3.3 G/DL (ref 3.5–5.2)
ALBUMIN SERPL BCP-MCNC: 3.4 G/DL (ref 3.5–5.2)
ALLENS TEST: ABNORMAL
ALLENS TEST: ABNORMAL
ALLENS TEST: NORMAL
ALP SERPL-CCNC: 80 U/L (ref 55–135)
ALP SERPL-CCNC: 82 U/L (ref 55–135)
ALT SERPL W/O P-5'-P-CCNC: 13 U/L (ref 10–44)
ALT SERPL W/O P-5'-P-CCNC: 14 U/L (ref 10–44)
AMMONIA PLAS-SCNC: 36 UMOL/L (ref 10–50)
AMPHET+METHAMPHET UR QL: NEGATIVE
ANION GAP SERPL CALC-SCNC: 10 MMOL/L (ref 8–16)
ANION GAP SERPL CALC-SCNC: 12 MMOL/L (ref 8–16)
ANION GAP SERPL CALC-SCNC: 9 MMOL/L (ref 8–16)
AST SERPL-CCNC: 30 U/L (ref 10–40)
AST SERPL-CCNC: 37 U/L (ref 10–40)
BACTERIA BLD CULT: NORMAL
BACTERIA BLD CULT: NORMAL
BARBITURATES UR QL SCN>200 NG/ML: NEGATIVE
BASOPHILS # BLD AUTO: 0.03 K/UL (ref 0–0.2)
BASOPHILS NFR BLD: 0.4 % (ref 0–1.9)
BENZODIAZ UR QL SCN>200 NG/ML: NEGATIVE
BILIRUB SERPL-MCNC: 0.3 MG/DL (ref 0.1–1)
BILIRUB SERPL-MCNC: 0.4 MG/DL (ref 0.1–1)
BUN SERPL-MCNC: 10 MG/DL (ref 6–20)
BUN SERPL-MCNC: 11 MG/DL (ref 6–20)
BUN SERPL-MCNC: 13 MG/DL (ref 6–20)
BZE UR QL SCN: NEGATIVE
CALCIUM SERPL-MCNC: 8.5 MG/DL (ref 8.7–10.5)
CALCIUM SERPL-MCNC: 8.9 MG/DL (ref 8.7–10.5)
CALCIUM SERPL-MCNC: 9.1 MG/DL (ref 8.7–10.5)
CANNABINOIDS UR QL SCN: NEGATIVE
CARBAMAZEPINE SERPL-MCNC: 24.8 UG/ML (ref 4–12)
CHLORIDE SERPL-SCNC: 105 MMOL/L (ref 95–110)
CHLORIDE SERPL-SCNC: 106 MMOL/L (ref 95–110)
CHLORIDE SERPL-SCNC: 107 MMOL/L (ref 95–110)
CO2 SERPL-SCNC: 24 MMOL/L (ref 23–29)
CO2 SERPL-SCNC: 26 MMOL/L (ref 23–29)
CO2 SERPL-SCNC: 26 MMOL/L (ref 23–29)
CREAT SERPL-MCNC: 1.4 MG/DL (ref 0.5–1.4)
CREAT SERPL-MCNC: 1.5 MG/DL (ref 0.5–1.4)
CREAT SERPL-MCNC: 1.5 MG/DL (ref 0.5–1.4)
CREAT UR-MCNC: 57 MG/DL (ref 15–325)
DELSYS: ABNORMAL
DIFFERENTIAL METHOD BLD: ABNORMAL
EOSINOPHIL # BLD AUTO: 0.2 K/UL (ref 0–0.5)
EOSINOPHIL NFR BLD: 2.2 % (ref 0–8)
ERYTHROCYTE [DISTWIDTH] IN BLOOD BY AUTOMATED COUNT: 20.8 % (ref 11.5–14.5)
ERYTHROCYTE [DISTWIDTH] IN BLOOD BY AUTOMATED COUNT: 21.3 % (ref 11.5–14.5)
EST. GFR  (NO RACE VARIABLE): 43.3 ML/MIN/1.73 M^2
EST. GFR  (NO RACE VARIABLE): 43.3 ML/MIN/1.73 M^2
EST. GFR  (NO RACE VARIABLE): 47 ML/MIN/1.73 M^2
ETHANOL UR-MCNC: <10 MG/DL
GLUCOSE SERPL-MCNC: 89 MG/DL (ref 70–110)
GLUCOSE SERPL-MCNC: 94 MG/DL (ref 70–110)
GLUCOSE SERPL-MCNC: 96 MG/DL (ref 70–110)
HCO3 UR-SCNC: 30.6 MMOL/L (ref 24–28)
HCO3 UR-SCNC: 30.7 MMOL/L (ref 24–28)
HCT VFR BLD AUTO: 30.5 % (ref 37–48.5)
HCT VFR BLD AUTO: 34.7 % (ref 37–48.5)
HCT VFR BLD CALC: 31 %PCV (ref 36–54)
HGB BLD-MCNC: 11.4 G/DL (ref 12–16)
HGB BLD-MCNC: 9.9 G/DL (ref 12–16)
IMM GRANULOCYTES # BLD AUTO: 0.03 K/UL (ref 0–0.04)
IMM GRANULOCYTES NFR BLD AUTO: 0.4 % (ref 0–0.5)
LACTATE SERPL-SCNC: 1 MMOL/L (ref 0.5–2.2)
LDH SERPL L TO P-CCNC: 1.22 MMOL/L (ref 0.5–2.2)
LYMPHOCYTES # BLD AUTO: 2.7 K/UL (ref 1–4.8)
LYMPHOCYTES NFR BLD: 33.2 % (ref 18–48)
MAGNESIUM SERPL-MCNC: 1.9 MG/DL (ref 1.6–2.6)
MCH RBC QN AUTO: 25.2 PG (ref 27–31)
MCH RBC QN AUTO: 25.3 PG (ref 27–31)
MCHC RBC AUTO-ENTMCNC: 32.5 G/DL (ref 32–36)
MCHC RBC AUTO-ENTMCNC: 32.9 G/DL (ref 32–36)
MCV RBC AUTO: 77 FL (ref 82–98)
MCV RBC AUTO: 78 FL (ref 82–98)
METHADONE UR QL SCN>300 NG/ML: NEGATIVE
MONOCYTES # BLD AUTO: 0.9 K/UL (ref 0.3–1)
MONOCYTES NFR BLD: 11.6 % (ref 4–15)
NEUTROPHILS # BLD AUTO: 4.2 K/UL (ref 1.8–7.7)
NEUTROPHILS NFR BLD: 52.2 % (ref 38–73)
NRBC BLD-RTO: 0 /100 WBC
OPIATES UR QL SCN: ABNORMAL
PCO2 BLDA: 53.7 MMHG (ref 35–45)
PCO2 BLDA: 56.7 MMHG (ref 35–45)
PCP UR QL SCN>25 NG/ML: NEGATIVE
PH SMN: 7.34 [PH] (ref 7.35–7.45)
PH SMN: 7.37 [PH] (ref 7.35–7.45)
PLATELET # BLD AUTO: 278 K/UL (ref 150–450)
PLATELET # BLD AUTO: 328 K/UL (ref 150–450)
PMV BLD AUTO: 10.4 FL (ref 9.2–12.9)
PMV BLD AUTO: 9.5 FL (ref 9.2–12.9)
PO2 BLDA: 37 MMHG (ref 40–60)
PO2 BLDA: 40 MMHG (ref 40–60)
POC BE: 5 MMOL/L
POC BE: 5 MMOL/L
POC IONIZED CALCIUM: 1.22 MMOL/L (ref 1.06–1.42)
POC SATURATED O2: 66 % (ref 95–100)
POC SATURATED O2: 72 % (ref 95–100)
POC TCO2: 32 MMOL/L (ref 24–29)
POC TCO2: 32 MMOL/L (ref 24–29)
POCT GLUCOSE: 92 MG/DL (ref 70–110)
POCT GLUCOSE: 96 MG/DL (ref 70–110)
POTASSIUM BLD-SCNC: 4 MMOL/L (ref 3.5–5.1)
POTASSIUM SERPL-SCNC: 3.8 MMOL/L (ref 3.5–5.1)
POTASSIUM SERPL-SCNC: 4.2 MMOL/L (ref 3.5–5.1)
POTASSIUM SERPL-SCNC: 4.2 MMOL/L (ref 3.5–5.1)
PROT SERPL-MCNC: 7.3 G/DL (ref 6–8.4)
PROT SERPL-MCNC: 7.5 G/DL (ref 6–8.4)
RBC # BLD AUTO: 3.93 M/UL (ref 4–5.4)
RBC # BLD AUTO: 4.51 M/UL (ref 4–5.4)
SAMPLE: ABNORMAL
SAMPLE: ABNORMAL
SAMPLE: NORMAL
SITE: ABNORMAL
SITE: ABNORMAL
SITE: NORMAL
SODIUM BLD-SCNC: 142 MMOL/L (ref 136–145)
SODIUM SERPL-SCNC: 141 MMOL/L (ref 136–145)
SODIUM SERPL-SCNC: 142 MMOL/L (ref 136–145)
SODIUM SERPL-SCNC: 142 MMOL/L (ref 136–145)
TOXICOLOGY INFORMATION: ABNORMAL
VANCOMYCIN SERPL-MCNC: 18.4 UG/ML
WBC # BLD AUTO: 6.72 K/UL (ref 3.9–12.7)
WBC # BLD AUTO: 8.05 K/UL (ref 3.9–12.7)

## 2024-07-02 PROCEDURE — 94761 N-INVAS EAR/PLS OXIMETRY MLT: CPT | Mod: XB

## 2024-07-02 PROCEDURE — 95700 EEG CONT REC W/VID EEG TECH: CPT

## 2024-07-02 PROCEDURE — 83605 ASSAY OF LACTIC ACID: CPT

## 2024-07-02 PROCEDURE — 95714 VEEG EA 12-26 HR UNMNTR: CPT

## 2024-07-02 PROCEDURE — 83735 ASSAY OF MAGNESIUM: CPT | Performed by: PHYSICIAN ASSISTANT

## 2024-07-02 PROCEDURE — 85027 COMPLETE CBC AUTOMATED: CPT | Performed by: HOSPITALIST

## 2024-07-02 PROCEDURE — 99900035 HC TECH TIME PER 15 MIN (STAT)

## 2024-07-02 PROCEDURE — 82803 BLOOD GASES ANY COMBINATION: CPT

## 2024-07-02 PROCEDURE — 80156 ASSAY CARBAMAZEPINE TOTAL: CPT | Performed by: NURSE PRACTITIONER

## 2024-07-02 PROCEDURE — 80053 COMPREHEN METABOLIC PANEL: CPT | Mod: 91 | Performed by: NURSE PRACTITIONER

## 2024-07-02 PROCEDURE — 25000003 PHARM REV CODE 250: Performed by: HOSPITALIST

## 2024-07-02 PROCEDURE — 80048 BASIC METABOLIC PNL TOTAL CA: CPT | Performed by: PHYSICIAN ASSISTANT

## 2024-07-02 PROCEDURE — 82800 BLOOD PH: CPT

## 2024-07-02 PROCEDURE — 99223 1ST HOSP IP/OBS HIGH 75: CPT | Mod: ,,, | Performed by: PSYCHIATRY & NEUROLOGY

## 2024-07-02 PROCEDURE — 25000003 PHARM REV CODE 250: Performed by: PHYSICIAN ASSISTANT

## 2024-07-02 PROCEDURE — 36415 COLL VENOUS BLD VENIPUNCTURE: CPT | Performed by: PHYSICIAN ASSISTANT

## 2024-07-02 PROCEDURE — 83605 ASSAY OF LACTIC ACID: CPT | Performed by: PHYSICIAN ASSISTANT

## 2024-07-02 PROCEDURE — 82140 ASSAY OF AMMONIA: CPT | Performed by: NURSE PRACTITIONER

## 2024-07-02 PROCEDURE — 51702 INSERT TEMP BLADDER CATH: CPT

## 2024-07-02 PROCEDURE — 25000003 PHARM REV CODE 250: Performed by: INTERNAL MEDICINE

## 2024-07-02 PROCEDURE — 36415 COLL VENOUS BLD VENIPUNCTURE: CPT | Performed by: NURSE PRACTITIONER

## 2024-07-02 PROCEDURE — 80307 DRUG TEST PRSMV CHEM ANLYZR: CPT

## 2024-07-02 PROCEDURE — 63600175 PHARM REV CODE 636 W HCPCS: Performed by: HOSPITALIST

## 2024-07-02 PROCEDURE — 99291 CRITICAL CARE FIRST HOUR: CPT | Mod: ,,, | Performed by: NURSE PRACTITIONER

## 2024-07-02 PROCEDURE — 95720 EEG PHY/QHP EA INCR W/VEEG: CPT | Mod: ,,, | Performed by: PSYCHIATRY & NEUROLOGY

## 2024-07-02 PROCEDURE — 25500020 PHARM REV CODE 255: Performed by: HOSPITALIST

## 2024-07-02 PROCEDURE — 99233 SBSQ HOSP IP/OBS HIGH 50: CPT | Mod: ,,, | Performed by: PHYSICIAN ASSISTANT

## 2024-07-02 PROCEDURE — 27000221 HC OXYGEN, UP TO 24 HOURS

## 2024-07-02 PROCEDURE — 80053 COMPREHEN METABOLIC PANEL: CPT | Performed by: PHYSICIAN ASSISTANT

## 2024-07-02 PROCEDURE — 80202 ASSAY OF VANCOMYCIN: CPT | Performed by: HOSPITALIST

## 2024-07-02 PROCEDURE — 20000000 HC ICU ROOM

## 2024-07-02 PROCEDURE — 85025 COMPLETE CBC W/AUTO DIFF WBC: CPT | Performed by: PHYSICIAN ASSISTANT

## 2024-07-02 PROCEDURE — 99223 1ST HOSP IP/OBS HIGH 75: CPT | Mod: ,,, | Performed by: EMERGENCY MEDICINE

## 2024-07-02 PROCEDURE — 63600175 PHARM REV CODE 636 W HCPCS: Performed by: PHYSICIAN ASSISTANT

## 2024-07-02 RX ORDER — NALOXONE HCL 0.4 MG/ML
VIAL (ML) INJECTION
Status: DISPENSED
Start: 2024-07-02 | End: 2024-07-03

## 2024-07-02 RX ORDER — SODIUM CHLORIDE 0.9 % (FLUSH) 0.9 %
10 SYRINGE (ML) INJECTION EVERY 6 HOURS
Status: DISCONTINUED | OUTPATIENT
Start: 2024-07-02 | End: 2024-07-03

## 2024-07-02 RX ORDER — NALOXONE HCL 0.4 MG/ML
0.4 VIAL (ML) INJECTION
Status: DISCONTINUED | OUTPATIENT
Start: 2024-07-02 | End: 2024-07-12 | Stop reason: HOSPADM

## 2024-07-02 RX ORDER — SODIUM CHLORIDE 0.9 % (FLUSH) 0.9 %
10 SYRINGE (ML) INJECTION
Status: DISCONTINUED | OUTPATIENT
Start: 2024-07-02 | End: 2024-07-12 | Stop reason: HOSPADM

## 2024-07-02 RX ADMIN — POLYETHYLENE GLYCOL 3350 17 G: 17 POWDER, FOR SOLUTION ORAL at 10:07

## 2024-07-02 RX ADMIN — VANCOMYCIN HYDROCHLORIDE 750 MG: 750 INJECTION, POWDER, LYOPHILIZED, FOR SOLUTION INTRAVENOUS at 05:07

## 2024-07-02 RX ADMIN — CARBAMAZEPINE 800 MG: 200 TABLET ORAL at 10:07

## 2024-07-02 RX ADMIN — AMLODIPINE BESYLATE 10 MG: 10 TABLET ORAL at 10:07

## 2024-07-02 RX ADMIN — IOHEXOL 100 ML: 350 INJECTION, SOLUTION INTRAVENOUS at 02:07

## 2024-07-02 RX ADMIN — CARVEDILOL 25 MG: 25 TABLET, FILM COATED ORAL at 10:07

## 2024-07-02 RX ADMIN — HYDROMORPHONE HYDROCHLORIDE 2 MG: 1 INJECTION, SOLUTION INTRAMUSCULAR; INTRAVENOUS; SUBCUTANEOUS at 10:07

## 2024-07-02 RX ADMIN — POTASSIUM CHLORIDE 20 MEQ: 1500 TABLET, EXTENDED RELEASE ORAL at 10:07

## 2024-07-02 RX ADMIN — FLUOXETINE HYDROCHLORIDE 40 MG: 20 CAPSULE ORAL at 10:07

## 2024-07-02 RX ADMIN — NALXONE HYDROCHLORIDE 0.4 MG: 0.4 INJECTION INTRAMUSCULAR; INTRAVENOUS; SUBCUTANEOUS at 02:07

## 2024-07-02 RX ADMIN — GABAPENTIN 800 MG: 400 CAPSULE ORAL at 10:07

## 2024-07-02 RX ADMIN — APIXABAN 5 MG: 5 TABLET, FILM COATED ORAL at 10:07

## 2024-07-02 RX ADMIN — LACTULOSE 30 G: 20 SOLUTION ORAL at 10:07

## 2024-07-02 RX ADMIN — MORPHINE SULFATE 15 MG: 15 TABLET, EXTENDED RELEASE ORAL at 10:07

## 2024-07-02 RX ADMIN — PREGABALIN 50 MG: 50 CAPSULE ORAL at 10:07

## 2024-07-02 NOTE — ASSESSMENT & PLAN NOTE
44-year-old female with sickle cell disease, PE, pain crises in the past with ports in place for exchange transfusions admitted with chest pain, back pain and leg pain which is typical of her sickle cell pain crises and found to have Staph epidermidis bacteremia (same susceptibilities). She had a port replaced on the left side fairly recently after an infection and migration of port. She cannot recall specific details regarding infection. No records available for review.  TTE has no mention of vegetations.  She is afebrile and white blood cell count is normal.  She is on vancomycin. Repeat blood cultures 6/27 are NGTD.      Recommendations  1. Continue IV Vancomycin x 14 days for Staph epidermidis bacteremia.   2. If patient develops recurrent bacteremia after completion of IV antibiotics, suspect ports may be the source and recommend removal for definitive treatment.  3. Please inform ID if able to obtain outside hospital records for review.   3. Discussed plan with primary team and ID staff. ID will sign off. See OPAT plan below.         Outpatient Antibiotic Therapy Plan:    Please send referral to [facility].    1) Infection: staph epidermidis bacteremia    2) Discharge Antibiotics:    Intravenous antibiotics:  IV Vancomycin (inpatient pharmacy currently managing dosing - on hold due to supratherapeutic trough)    3) Therapy Duration:  14 days    Estimated end date of IV antibiotics: 7/11/24    4) Outpatient Weekly Labs:    Order the following labs to be drawn on Mondays:   CBC  CMP   CRP  Vancomycin trough. Target 15-20    If discharged on vancomycin IV, order the following additional labs to be drawn on Thursdays:  CMP   Vancomycin trough. Target 15-20    If vancomycin trough is not at target (15-20) prior to discharge, schedule vancomycin trough to be drawn before their fourth outpatient dose.    5) Fax Lab Results to Infectious Diseases Provider: Sussy Bowden    [facility]  ID Clinic Fax Number: [phone]    6) Outpatient Infectious Diseases Follow-up    Follow-up appointment will be arranged by the ID clinic and will be found in the patient's appointments tab.    Prior to discharge, please ensure the patient's follow-up has been scheduled.    If there is still no follow-up scheduled prior to discharge, please send an Triptease message to [facility] Clinical Pool or Call Infectious Diseases Dept.

## 2024-07-02 NOTE — CARE UPDATE
Unit [facility] Care Support Interaction      I have reviewed the chart of Nazanin Malone who is hospitalized for Sickle-cell disease with pain. The patient is currently located in the following unit: 14W          I have seen and examined the patient and provided the following support:     Proactive rounds - patient was unstable - activated RRT and primary team was alerted. Narcan given X 2 with minimal response. Stroke code activated.       Lois Bose PA-C  Unit Based [facility]

## 2024-07-02 NOTE — H&P
[facility] - Cardiac Medical [facility]  Critical Care Medicine  History & Physical    Patient Name: Nazanin Malone  MRN: [id number]  Admission Date: 2024  Hospital Length of Stay: 5 days  Code Status: Full Code  Attending Physician: Waldemar Linda MD   Primary Care Provider: Pee Montgomery MD   Principal Problem: Encephalopathy    Subjective:     HPI:  Ms. Nazanin Malone is a 44 y.o. woman with PMHx of sickle cell disease, acute chest syndrome, HTN, depression, sickle cell beta thalassemia.  Has received exchange transfusion in the past with right chest wall port.  History of PE but has not been taking Eliquis for the past several months related to insurance issues.  She recently moved here from [place].  She presented to ED for evaluation of chest pain in the setting of sickle cell disease.  Admitted to hospital medicine.     On  patient had altered mental status with concern for seizures.  EEG completed with no seizure activity and code stroke activated.  CTH with no acute intracranial pathology.  Critical Care Medicine consulted for encephalopathy and patient transferred to MICU.      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    Asthma     Depression     Hypertension     Morbid obesity     Opioid dependence 2017    Pneumonia due to Streptococcus pneumoniae 2017    Right lower lobe pneumonia 2017    Sepsis due to Streptococcus pneumoniae 2017    Sickle cell-beta thalassemia disease with pain     Trigeminal neuralgia        Past Surgical History:   Procedure Laterality Date     SECTION      TONSILLECTOMY      TUBAL LIGATION         Review of patient's allergies indicates:  No Known Allergies    Family History       Problem Relation (Age of Onset)    Diabetes Mother    Heart disease Mother, Father          Tobacco Use     Smoking status: Never    Smokeless tobacco: Never   Substance and Sexual Activity    Alcohol use: No    Drug use: Yes     Types: Marijuana     Comment: periodically     Sexual activity: Not Currently     Birth control/protection: See Surgical Hx      Review of Systems   Unable to perform ROS: Acuity of condition     Objective:     Vital Signs (Most Recent):  Temp: 98.6 °F (37 °C) (07/02/24 1530)  Pulse: 86 (07/02/24 1530)  Resp: 10 (07/02/24 1530)  BP: (!) 151/84 (07/02/24 1530)  SpO2: 100 % (07/02/24 1530) Vital Signs (24h Range):  Temp:  [97.7 °F (36.5 °C)-99.2 °F (37.3 °C)] 98.6 °F (37 °C)  Pulse:  [80-92] 86  Resp:  [9-19] 10  SpO2:  [92 %-100 %] 100 %  BP: ()/(60-93) 151/84   Weight: 86.2 kg (190 lb 0.6 oz)  Body mass index is 34.76 kg/m².      Intake/Output Summary (Last 24 hours) at 7/2/2024 1821  Last data filed at 7/1/2024 1947  Gross per 24 hour   Intake 3088.53 ml   Output --   Net 3088.53 ml          Physical Exam  Constitutional:       Appearance: She is not toxic-appearing.      Comments: Obtunded   HENT:      Head: Normocephalic and atraumatic.   Cardiovascular:      Rate and Rhythm: Normal rate and regular rhythm.      Heart sounds: S1 normal and S2 normal. No murmur heard.  Pulmonary:      Effort: Pulmonary effort is normal. No tachypnea or respiratory distress.      Breath sounds: Normal breath sounds. No decreased breath sounds, wheezing, rhonchi or rales.   Abdominal:      General: Abdomen is protuberant.      Palpations: Abdomen is soft.   Musculoskeletal:      Cervical back: Neck supple.   Neurological:      Mental Status: She is lethargic.      Comments: Obtunded; arouses to aggressive sternal rub and mumbles intermittently            Vents:     Lines/Drains/Airways       Drain  Duration             Female External Urinary Catheter w/ Suction 07/01/24 1230 1 day              Peripheral Intravenous Line  Duration                  Midline Catheter - Single Lumen 07/02/24 Right basilic vein  (medial side of arm) 18g x 10cm <1 day         Peripheral IV - Single Lumen 07/01/24 1915 20 G 1 3/4 in Yes Anterior;Left Upper Arm <1 day                  Significant Labs:    CBC/Anemia Profile:  Recent Labs   Lab 07/01/24 0914 07/02/24  0533 07/02/24  1440 07/02/24  1546   WBC 7.19 6.72  --  8.05   HGB 11.8* 9.9*  --  11.4*   HCT 35.7* 30.5* 31* 34.7*    278  --  328   MCV 78* 78*  --  77*   RDW 21.6* 20.8*  --  21.3*        Chemistries:  Recent Labs   Lab 07/01/24 0914 07/01/24  1425 07/02/24  0533 07/02/24  1546 07/02/24  1642     --  142 142 141   K 4.3  --  4.2 3.8 4.2     --  107 106 105   CO2 20*  --  26 26 24   BUN 9  --  13 10 11   CREATININE 1.0   < > 1.5* 1.4 1.5*   CALCIUM 9.6  --  8.5* 9.1 8.9   ALBUMIN  --   --   --  3.4* 3.3*   PROT  --   --   --  7.3 7.5   BILITOT  --   --   --  0.4 0.3   ALKPHOS  --   --   --  82 80   ALT  --   --   --  13 14   AST  --   --   --  30 37   MG 1.9  --  1.9  --   --     < > = values in this interval not displayed.       All pertinent labs within the past 24 hours have been reviewed.    Significant Imaging: I have reviewed all pertinent imaging results/findings within the past 24 hours.  Assessment/Plan:     Neuro  * Encephalopathy  Rapid response called 7/2 for AMS. Initial concern for AMS 2/2 opioid administration after receiving 2mg Dilaudid but no improvement after repeated Narcan administration. Stroke code called; CTA negative. No focal deficits. She appeared to be post-ictal (no hx of seizures).     --Follow up EEG  --No narcotics/sedatives  --Follow up tegretol level  --Serial VBGs; may need airway protection if mental status worsens    Pulmonary  Intermittent asthma  Does not appear to be in exacerbation.      --Duonebs PRN    Cardiac/Vascular  Hypertension  --Continue coreg, amlodipine     Renal/  FLOR (acute kidney injury)  Patient with acute kidney injury likely.  Which is currently worsening. Labs reviewed- Renal function/electrolytes  with Estimated Creatinine Clearance: 47.7 mL/min (A) (based on SCr of 1.5 mg/dL (H)). according to latest data.     --Renally dose medications, avoid nephrotoxins  --Strict I/O  --Trend BMP daily    ID  Bacteremia  Staph epi bacteremia on blood cx, blood cx from 7/1 with NGTD.      --Continue vanc x14 days per ID recs  --If bacteremia persists, port may be source    Hematology  History of pulmonary embolism  CTA on 6/26 with no large central/saddle PE, nondiagnostic beyond level of R and L pulmonary arteries.  History of PE on eliquis    --Continue eliquis    Oncology  Sickle-cell disease with pain  History of Acute Chest Syndrome and Pulmonary embolism.  Came in to hospital with chest pain.      --Will need to reestablish care with heme/onc  --Multimodal pain control, had been on dilaudid 2mg now obtunded.  Will hold in setting of AMS, resume when appropriate     Iron deficiency anemia  --CBC daily         Critical Care Time: 60 minutes  Critical secondary to Patient has an abrupt change in neurologic status: Seizure    Critical care was time spent personally by me on the following activities: development of treatment plan with patient or surrogate and bedside caregivers, discussions with consultants, evaluation of patient's response to treatment, examination of patient, ordering and performing treatments and interventions, ordering and review of laboratory studies, ordering and review of radiographic studies, pulse oximetry, re-evaluation of patient's condition. This critical care time did not overlap with that of any other provider or involve time for any procedures.     Sara Aguilar NP  Critical Care Medicine  [facility] - Cardiac Medical ICU

## 2024-07-02 NOTE — ASSESSMENT & PLAN NOTE
44 y.o. yo female with PMHx  who is currently admitted to  service with chest and leg pain, hx of acute chest syndrome secondary to sickle cell anemia. LKN 12:15 pm. Stroke code called after patient found with decreased level of arousal. Received dilaudid 2 mg and morphine 15 mg around 10 am. Found minimally responsive at 2 pm, rapid response called. Narcan given- arousal improved-  moving all extremities, speech dysarthric/unintelligible.  Pt was transferred to stat CTA . Dr. Claudio reviewed images as acquired. No LVO. Exam non focal, patient able to answer simple questions and follow simple commands, after vigorous arousal. Generally weak upper extremities < lower extremities. Patient snoring and yawning in between efforts to provide vigorous stimulation. Low suspicious for stroke as cause of symptoms. DDx includes polypharmacy vs seizure. Recommend MRI brain to definitively rule out stroke    Antithrombotics for stroke prevention:continue Eliquis

## 2024-07-02 NOTE — ASSESSMENT & PLAN NOTE
-stroke risk factor   Morbid Obesity  - Patient's Body mass index is 34.76 kg/m². on admit.   - Patient counseled on need for weight loss.   - Patient counseled on risks of increased mortality related to obesity and increased risk of diabetes, hypertension, pulmonary and breathing problems, arthritis from degeneration of joints, skin ulcers and infections and heart disease if does not have significant weight loss.   - Discussed with patient need for outpatient follow-up with primary care physician to assess best strategy for patient to assist in weight loss.

## 2024-07-02 NOTE — PROGRESS NOTES
[facility] - Stepdown [facility] ([place])  Infectious Disease  Progress Note    Patient Name: Nazanin Malone  MRN: [id number]  Admission Date: 6/26/2024  Length of Stay: 5 days  Attending Physician: Vijaya Acosta MD  Primary Care Provider: Pee Montgomery MD    Isolation Status: No active isolations  Assessment/Plan:      ID  Bacteremia    44-year-old female with sickle cell disease, PE, pain crises in the past with ports in place for exchange transfusions admitted with chest pain, back pain and leg pain which is typical of her sickle cell pain crises and found to have Staph epidermidis bacteremia (same susceptibilities). She had a port replaced on the left side fairly recently after an infection and migration of port. She cannot recall specific details regarding infection. No records available for review.  TTE has no mention of vegetations.  She is afebrile and white blood cell count is normal.  She is on vancomycin. Repeat blood cultures 6/27 are NGTD.      Recommendations  1. Continue IV Vancomycin x 14 days for Staph epidermidis bacteremia.   2. If patient develops recurrent bacteremia after completion of IV antibiotics, suspect ports may be the source and recommend removal for definitive treatment.  3. Please inform ID if able to obtain outside hospital records for review.   3. Discussed plan with primary team and ID staff. ID will sign off. See OPAT plan below.         Outpatient Antibiotic Therapy Plan:    Please send referral to [facility].    1) Infection: staph epidermidis bacteremia    2) Discharge Antibiotics:    Intravenous antibiotics:  IV Vancomycin (inpatient pharmacy currently managing dosing - on hold due to supratherapeutic trough)    3) Therapy Duration:  14 days    Estimated end date of IV antibiotics: 7/11/24    4) Outpatient Weekly Labs:    Order the following labs to be drawn on Mondays:   CBC  CMP   CRP  Vancomycin trough. Target 15-20    If  discharged on vancomycin IV, order the following additional labs to be drawn on Thursdays:  CMP   Vancomycin trough. Target 15-20    If vancomycin trough is not at target (15-20) prior to discharge, schedule vancomycin trough to be drawn before their fourth outpatient dose.    5) Fax Lab Results to Infectious Diseases Provider: Sussy Bowden    [facility] Fax Number: [phone]    6) Outpatient Infectious Diseases Follow-up    Follow-up appointment will be arranged by the ID clinic and will be found in the patient's appointments tab.    Prior to discharge, please ensure the patient's follow-up has been scheduled.    If there is still no follow-up scheduled prior to discharge, please send an [employer] message to [facility] [place] or Call Infectious Diseases Dept.                Thank you for the consult. Please secure chat for any questions.  Sussy Bowden PA-C      Subjective:     Principal Problem:Sickle-cell disease with pain    HPI: 44-year-old female with a history of acute chest syndrome, sickle cell disease with beta thalassemia, asthma, pulmonary embolism in the past but has been off Eliquis secondary to insurance issues, depression, hypertension, morbid obesity, trigeminal neuralgia.  She presented to the ED with chest pain as well as leg pain x3 days.  Of note she has a port in her right chest that she uses for exchange transfusion.  She reports having an infection in a port previously and it was removed and replaced on the opposite side, the left.  Since it had been replaced she says she has had pain and itching there and something does not feel normal versus her prior ports.  Blood cultures were drawn on 06/26 and show 4/4 bottles GPC resembling staph with repeat that is no growth to date.  PCR shows non staph aureus non MRSA.  Chest x-ray shows lungs are clear.  CTA done and and was essentially nondiagnostic due to suboptimal bolus timing but no definite large acute central/saddle embolus was  "noted.  She has had a TTE without mention of vegetation.  She has been treated with vancomycin.  She has been afebrile since admit, white blood cell count is normal.  Patient denies any recent or current fevers, chills, or sweats.  Id is consulted for antibiotic recs.  Interval History:   Patient seen at bedside.   Afebrile. Repeat blood cultures are no growth to date. On vancomycin.   Patient continues to appear lethargic and sedated on exam today.  Secure Chat sent to primary team.  Awakens to open eyes and say yes to noxious stimuli. Repeatedly answers "I have two ports here and here" to any and all questions.     Review of Systems   Unable to perform ROS: Other (sedation)     Objective:     Vital Signs (Most Recent):  Temp: 99.2 °F (37.3 °C) (07/02/24 1240)  Pulse: 87 (07/02/24 1240)  Resp: 13 (07/02/24 1240)  BP: 126/78 (07/02/24 1240)  SpO2: (!) 93 % (07/02/24 1240) Vital Signs (24h Range):  Temp:  [97.7 °F (36.5 °C)-99.2 °F (37.3 °C)] 99.2 °F (37.3 °C)  Pulse:  [78-92] 87  Resp:  [9-19] 13  SpO2:  [92 %-98 %] 93 %  BP: ()/(60-93) 126/78     Weight: 86.2 kg (190 lb 0.6 oz)  Body mass index is 34.76 kg/m².    Estimated Creatinine Clearance: 47.7 mL/min (A) (based on SCr of 1.5 mg/dL (H)).     Physical Exam  Constitutional:       General: She is not in acute distress.     Appearance: She is well-developed. She is obese. She is not ill-appearing, toxic-appearing or diaphoretic.   HENT:      Head: Normocephalic and atraumatic.      Nose: Nose normal. No congestion.      Mouth/Throat:      Pharynx: Oropharynx is clear. No oropharyngeal exudate.   Eyes:      Conjunctiva/sclera: Conjunctivae normal.   Cardiovascular:      Rate and Rhythm: Normal rate and regular rhythm.      Heart sounds: Normal heart sounds.   Pulmonary:      Effort: Pulmonary effort is normal. No respiratory distress.      Breath sounds: Normal breath sounds. No wheezing or rales.   Abdominal:      General: Bowel sounds are normal. There is " "no distension.      Palpations: Abdomen is soft.      Tenderness: There is no abdominal tenderness. There is no guarding.   Skin:     General: Skin is warm and dry.      Comments: Port sites healed. No swelling, redness.    Neurological:      Mental Status: She is alert.      Comments: Patient drowsy/sedated. Unable to appropriately participate with conversation          Significant Labs: Blood Culture:   Recent Labs   Lab 06/26/24  1346 06/27/24  1532 06/27/24  1544   LABBLOO Gram stain kimberly bottle: Gram positive cocci in clusters resembling Staph  Results called to and read back by: Eileen Soler LPN 06/27/2024  09:34  Gram stain aer bottle: Gram positive cocci in clusters resembling Staph  Positive results previously called 06/27/2024  11:27  STAPHYLOCOCCUS EPIDERMIDIS*  Gram stain aer bottle: Gram positive cocci in clusters resembling Staph  Results called to and read back by: Eileen Soler LPN 06/27/2024  09:32  Gram stain kimberly bottle: Gram positive cocci in clusters resembling Staph  Positive results previously called 06/27/2024  11:26  STAPHYLOCOCCUS EPIDERMIDIS* No Growth to date  No Growth to date  No Growth to date  No Growth to date  No Growth to date No Growth to date  No Growth to date  No Growth to date  No Growth to date  No Growth to date     CBC:   Recent Labs   Lab 07/01/24  0914 07/02/24  0533   WBC 7.19 6.72   HGB 11.8* 9.9*   HCT 35.7* 30.5*    278     CMP:   Recent Labs   Lab 07/01/24  0914 07/01/24  1425 07/02/24  0533     --  142   K 4.3  --  4.2     --  107   CO2 20*  --  26   GLU 90  --  94   BUN 9  --  13   CREATININE 1.0 1.1 1.5*   CALCIUM 9.6  --  8.5*   ANIONGAP 10  --  9     Wound Culture: No results for input(s): "LABAERO" in the last 4320 hours.  All pertinent labs within the past 24 hours have been reviewed.    Significant Imaging: I have reviewed all pertinent imaging results/findings within the past 24 hours.  Procedure Component Value Units Date/Time "   X-Ray Chest PA And Lateral [[id number]] Resulted: 06/26/24 1502   Order Status: Completed Updated: 06/26/24 1505   Narrative:     EXAMINATION:  XR CHEST PA AND LATERAL    CLINICAL HISTORY:  Chest Pain;    TECHNIQUE:  PA and lateral views of the chest were performed.    COMPARISON:  02/27/2021    FINDINGS:  Cardiac size is normal and aorta is tortuous.  Vascular catheters are seen with the tip in the superior vena cava.  Lungs are clear and well expanded with no infiltrate or pneumothorax.   Impression:       See above      Electronically signed by: Luis Manuel Reed MD  Date: 06/26/2024  Time: 15:02   CTA Chest Non-Coronary (PE Studies) [[id number]] Resulted: 06/26/24 1445   Order Status: Completed Updated: 06/26/24 1447   Narrative:     EXAMINATION:  CTA CHEST NON CORONARY (PE STUDIES)    CLINICAL HISTORY:  Pulmonary embolism (PE) suspected, high prob;    TECHNIQUE:  Low dose axial images, sagittal and coronal reformations were obtained from the thoracic inlet to the lung bases following the IV administration of 100 mL of Omnipaque 350.  Contrast timing was optimized to evaluate the pulmonary arteries.  MIP images were performed.    COMPARISON:  CTA chest 06/08/2020.    FINDINGS:  Exam quality: Examination is essentially nondiagnostic beyond the levels of the main right and left pulmonary arteries due to suboptimal contrast bolus timing and respiratory motion.    Pulmonary embolism: No convincing evidence of acute large central/saddle-type embolus.    Central pulmonary arteries: Normal caliber.    Aorta: Left-sided arch.  Normal caliber.  Incidental note is made of anatomic variant aberrant right subclavian artery, with retroesophageal course.    Heart: No right heart strain. Normal size.    Coronary arteries: No calcifications.    Pericardium: Normal. No effusion, thickening, or calcification.    Support tubes and lines: Right internal jugular approach port catheter.    Base of neck/thyroid: Normal.    Lymph  nodes: No supraclavicular, axillary, internal mammary, mediastinal, or hilar adenopathy.    Esophagus: Normal.    Pleura: No effusion, thickening, or calcification.    Upper abdomen: Ill-defined patchy enhancement noted in the right posterior hepatic lobe (for example as seen on axial series 2, image 369).  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    Body wall: Unremarkable    Airways: Central airways grossly patent.    Lungs: Assessment compromised by respiratory motion.  Noting this, no definite acute appearing focal or diffuse process is appreciated.    Bones: No definite acute abnormality.   Impression:       1. Examination is essentially nondiagnostic for assessment of the pulmonary arteries beyond the level of the main right and left pulmonary arteries due to suboptimal contrast bolus timing and respiratory motion.  2. No definite acute large central/saddle-type embolus.  3. No definite acute intrathoracic findings.  4. Additional details, as provided in the body of report.      Electronically signed by: Reggie Rodrigues  Date: 06/26/2024  Time: 14:45      Imaging History     2024    Date Procedure Name Study Review Link PACS Link Status Accession Number Location   06/28/24 05:55 PM Cardiac monitoring strips Study Review  Final     06/28/24 05:55 PM Cardiac monitoring strips Study Review  Final     06/27/24 06:46 PM Cardiac monitoring strips Study Review  Final     06/27/24 02:57 PM Echo Study Review  Images Final [id number] [facility]   06/26/24 03:00 PM X-Ray Chest PA And Lateral Study Review  Images Final [id number] [facility]   06/26/24 02:15 PM CTA Chest Non-Coronary (PE Studies) Study Review  Images Final [id number] [facility]

## 2024-07-02 NOTE — PROCEDURES
Prelim EEG read  EEG reviewed 15:36-16:10    Diffusely slow background consisting mostly of delta activity with some overriding theta frequencies.  No epileptiform activity noted.    Impression: moderate to severe encephalopathy, no seizures.    Marilyn Rodriguez MD  [facility]   Department of Neurology/Epilepsy

## 2024-07-02 NOTE — ASSESSMENT & PLAN NOTE
Patient with acute kidney injury likely.  Which is currently worsening. Labs reviewed- Renal function/electrolytes with Estimated Creatinine Clearance: 47.7 mL/min (A) (based on SCr of 1.5 mg/dL (H)). according to latest data.     --Renally dose medications, avoid nephrotoxins  --Strict I/O  --Trend BMP daily

## 2024-07-02 NOTE — SUBJECTIVE & OBJECTIVE
"Interval History:   Patient seen at bedside.   Afebrile. Repeat blood cultures are no growth to date. On vancomycin.   Patient continues to appear lethargic and sedated on exam today.  Secure Chat sent to primary team.  Awakens to open eyes and say yes to noxious stimuli. Repeatedly answers "I have two ports here and here" to any and all questions.     Review of Systems   Unable to perform ROS: Other (sedation)     Objective:     Vital Signs (Most Recent):  Temp: 99.2 °F (37.3 °C) (07/02/24 1240)  Pulse: 87 (07/02/24 1240)  Resp: 13 (07/02/24 1240)  BP: 126/78 (07/02/24 1240)  SpO2: (!) 93 % (07/02/24 1240) Vital Signs (24h Range):  Temp:  [97.7 °F (36.5 °C)-99.2 °F (37.3 °C)] 99.2 °F (37.3 °C)  Pulse:  [78-92] 87  Resp:  [9-19] 13  SpO2:  [92 %-98 %] 93 %  BP: ()/(60-93) 126/78     Weight: 86.2 kg (190 lb 0.6 oz)  Body mass index is 34.76 kg/m².    Estimated Creatinine Clearance: 47.7 mL/min (A) (based on SCr of 1.5 mg/dL (H)).     Physical Exam  Constitutional:       General: She is not in acute distress.     Appearance: She is well-developed. She is obese. She is not ill-appearing, toxic-appearing or diaphoretic.   HENT:      Head: Normocephalic and atraumatic.      Nose: Nose normal. No congestion.      Mouth/Throat:      Pharynx: Oropharynx is clear. No oropharyngeal exudate.   Eyes:      Conjunctiva/sclera: Conjunctivae normal.   Cardiovascular:      Rate and Rhythm: Normal rate and regular rhythm.      Heart sounds: Normal heart sounds.   Pulmonary:      Effort: Pulmonary effort is normal. No respiratory distress.      Breath sounds: Normal breath sounds. No wheezing or rales.   Abdominal:      General: Bowel sounds are normal. There is no distension.      Palpations: Abdomen is soft.      Tenderness: There is no abdominal tenderness. There is no guarding.   Skin:     General: Skin is warm and dry.      Comments: Port sites healed. No swelling, redness.    Neurological:      Mental Status: She is alert. " "     Comments: Patient drowsy/sedated. Unable to appropriately participate with conversation          Significant Labs: Blood Culture:   Recent Labs   Lab 06/26/24  1346 06/27/24  1532 06/27/24  1544   LABBLOO Gram stain kimberly bottle: Gram positive cocci in clusters resembling Staph  Results called to and read back by: Eileen Soler LPN 06/27/2024  09:34  Gram stain aer bottle: Gram positive cocci in clusters resembling Staph  Positive results previously called 06/27/2024  11:27  STAPHYLOCOCCUS EPIDERMIDIS*  Gram stain aer bottle: Gram positive cocci in clusters resembling Staph  Results called to and read back by: Eileen Soler LPN 06/27/2024  09:32  Gram stain kimberly bottle: Gram positive cocci in clusters resembling Staph  Positive results previously called 06/27/2024  11:26  STAPHYLOCOCCUS EPIDERMIDIS* No Growth to date  No Growth to date  No Growth to date  No Growth to date  No Growth to date No Growth to date  No Growth to date  No Growth to date  No Growth to date  No Growth to date     CBC:   Recent Labs   Lab 07/01/24  0914 07/02/24  0533   WBC 7.19 6.72   HGB 11.8* 9.9*   HCT 35.7* 30.5*    278     CMP:   Recent Labs   Lab 07/01/24  0914 07/01/24  1425 07/02/24  0533     --  142   K 4.3  --  4.2     --  107   CO2 20*  --  26   GLU 90  --  94   BUN 9  --  13   CREATININE 1.0 1.1 1.5*   CALCIUM 9.6  --  8.5*   ANIONGAP 10  --  9     Wound Culture: No results for input(s): "LABAERO" in the last 4320 hours.  All pertinent labs within the past 24 hours have been reviewed.    Significant Imaging: I have reviewed all pertinent imaging results/findings within the past 24 hours.  Procedure Component Value Units Date/Time   X-Ray Chest PA And Lateral [[id number]] Resulted: 06/26/24 1502   Order Status: Completed Updated: 06/26/24 1505   Narrative:     EXAMINATION:  XR CHEST PA AND LATERAL    CLINICAL HISTORY:  Chest Pain;    TECHNIQUE:  PA and lateral views of the chest were " performed.    COMPARISON:  02/27/2021    FINDINGS:  Cardiac size is normal and aorta is tortuous.  Vascular catheters are seen with the tip in the superior vena cava.  Lungs are clear and well expanded with no infiltrate or pneumothorax.   Impression:       See above      Electronically signed by: Luis Manuel Reed MD  Date: 06/26/2024  Time: 15:02   CTA Chest Non-Coronary (PE Studies) [[id number]] Resulted: 06/26/24 1445   Order Status: Completed Updated: 06/26/24 1447   Narrative:     EXAMINATION:  CTA CHEST NON CORONARY (PE STUDIES)    CLINICAL HISTORY:  Pulmonary embolism (PE) suspected, high prob;    TECHNIQUE:  Low dose axial images, sagittal and coronal reformations were obtained from the thoracic inlet to the lung bases following the IV administration of 100 mL of Omnipaque 350.  Contrast timing was optimized to evaluate the pulmonary arteries.  MIP images were performed.    COMPARISON:  CTA chest 06/08/2020.    FINDINGS:  Exam quality: Examination is essentially nondiagnostic beyond the levels of the main right and left pulmonary arteries due to suboptimal contrast bolus timing and respiratory motion.    Pulmonary embolism: No convincing evidence of acute large central/saddle-type embolus.    Central pulmonary arteries: Normal caliber.    Aorta: Left-sided arch.  Normal caliber.  Incidental note is made of anatomic variant aberrant right subclavian artery, with retroesophageal course.    Heart: No right heart strain. Normal size.    Coronary arteries: No calcifications.    Pericardium: Normal. No effusion, thickening, or calcification.    Support tubes and lines: Right internal jugular approach port catheter.    Base of neck/thyroid: Normal.    Lymph nodes: No supraclavicular, axillary, internal mammary, mediastinal, or hilar adenopathy.    Esophagus: Normal.    Pleura: No effusion, thickening, or calcification.    Upper abdomen: Ill-defined patchy enhancement noted in the right posterior hepatic lobe (for  example as seen on axial series 2, image 369).  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    Body wall: Unremarkable    Airways: Central airways grossly patent.    Lungs: Assessment compromised by respiratory motion.  Noting this, no definite acute appearing focal or diffuse process is appreciated.    Bones: No definite acute abnormality.   Impression:       1. Examination is essentially nondiagnostic for assessment of the pulmonary arteries beyond the level of the main right and left pulmonary arteries due to suboptimal contrast bolus timing and respiratory motion.  2. No definite acute large central/saddle-type embolus.  3. No definite acute intrathoracic findings.  4. Additional details, as provided in the body of report.      Electronically signed by: Reggie Rodrigues  Date: 06/26/2024  Time: 14:45      Imaging History     2024    Date Procedure Name Study Review Link PACS Link Status Accession Number Location   06/28/24 05:55 PM Cardiac monitoring strips Study Review  Final     06/28/24 05:55 PM Cardiac monitoring strips Study Review  Final     06/27/24 06:46 PM Cardiac monitoring strips Study Review  Final     06/27/24 02:57 PM Echo Study Review  Images Final [id number] [facility]   06/26/24 03:00 PM X-Ray Chest PA And Lateral Study Review  Images Final [id number] [facility]   06/26/24 02:15 PM CTA Chest Non-Coronary (PE Studies) Study Review  Images Final [id number] [facility]

## 2024-07-02 NOTE — HPI
Ms. Nazanin Malone is a 44 y.o. woman with PMHx of sickle cell disease, acute chest syndrome, HTN, depression, sickle cell beta thalassemia.  Has received exchange transfusion in the past with right chest wall port.  History of PE but has not been taking Eliquis for the past several months related to insurance issues.  She recently moved here from [place].  She presented to ED for evaluation of chest pain in the setting of sickle cell disease.  Admitted to hospital medicine.     On 7/2 patient had altered mental status with concern for seizures.  EEG completed with no seizure activity and code stroke activated.  CTH with no acute intracranial pathology.  Critical Care Medicine consulted for encephalopathy and patient transferred to MICU.

## 2024-07-02 NOTE — CODE/ RAPID DOCUMENTATION
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    Significant Events: Called urgently to the bedside to evaluate the patient  for altered mental status and unresponsiveness by the Bedside RN       OBJECTIVE:     Physical Exam  Constitutional:       Comments: Obtunded   HENT:      Head: Normocephalic and atraumatic.   Eyes:      Pupils: Pupils are equal, round, and reactive to light.   Cardiovascular:      Rate and Rhythm: Normal rate.      Heart sounds: S1 normal and S2 normal. No murmur heard.  Pulmonary:      Effort: Pulmonary effort is normal. No tachypnea or respiratory distress.      Breath sounds: Normal breath sounds. No decreased breath sounds, wheezing, rhonchi or rales.   Abdominal:      General: Abdomen is protuberant.      Palpations: Abdomen is soft.   Musculoskeletal:      Cervical back: Neck supple.   Neurological:      Mental Status: She is lethargic.      Comments: Obtunded; occasionally arouses to voice         Last VS: BP (!) 151/84   Pulse 86   Temp 98.6 °F (37 °C) (Oral)   Resp 10   Ht 5' 2" (1.575 m)   Wt 86.2 kg (190 lb 0.6 oz)   SpO2 100%   Breastfeeding No   BMI 34.76 kg/m²     24H Vital Sign Range:    Temp:  [97.7 °F (36.5 °C)-99.2 °F (37.3 °C)]   Pulse:  [80-92]   Resp:  [9-19]   BP: ()/(60-93)   SpO2:  [92 %-100 %]     Level of " Consciousness (AVPU): alert      Intake/Output Summary (Last 24 hours) at 7/2/2024 1806  Last data filed at 7/1/2024 1947  Gross per 24 hour   Intake 3088.53 ml   Output --   Net 3088.53 ml       Recent Labs     07/01/24  0914 07/02/24  0533 07/02/24  1440 07/02/24  1546   WBC 7.19 6.72  --  8.05   HGB 11.8* 9.9*  --  11.4*   HCT 35.7* 30.5* 31* 34.7*    278  --  328       Recent Labs     07/01/24  0914 07/01/24  1425 07/02/24  0533 07/02/24  1546 07/02/24  1642     --  142 142  --    K 4.3  --  4.2 3.8  --      --  107 106  --    CO2 20*  --  26 26 24   BUN 9  --  13 10 11   CREATININE 1.0   < > 1.5* 1.4 1.5*   GLU 90  --  94 89 96   MG 1.9  --  1.9  --   --     < > = values in this interval not displayed.        Recent Labs     07/02/24  1440 07/02/24  1639   PH 7.365 7.341*   PCO2 53.7* 56.7*   PO2 40 37*   HCO3 30.7* 30.6*   POCSATURATED 72 66   BE 5* 5*        Lab Results   Component Value Date    LACTATE 1.0 07/02/2024    LACTATE 0.9 02/27/2021    LACTATE 1.6 07/30/2019         ASSESSMENT AND PLAN :     Called to bedside with rapid team regarding AMS. Patient received 2mg dilaudid around 10am. Later became unresponsive; did not respond to 1.2mg Narcan. Stroke code called. Imaging negative and no focal deficits. She appeared to be post-ictal; EEG ordered. Initial vbg unremarkable.    After a period of observation, she did not improve and is becoming more hypercapnic. Decision made to transfer to ICU for encephalopathy and airway watch.      Uninterrupted Critical Care/Counseling Time (not including procedures): 60  Critical care was time spent personally by me on the following activities: development of treatment plan with patient or surrogate and bedside caregivers, discussions with consultants, evaluation of patient's response to treatment, examination of patient, ordering and performing treatments and interventions, ordering and review of laboratory studies, ordering and review of  radiographic studies, pulse oximetry, re-evaluation of patient's condition. This critical care time did not overlap with that of any other provider or involve time for any procedures.    Sara Aguilar, [facility]  Pulmonary Critical Care  [id number]

## 2024-07-02 NOTE — PROCEDURES
RAPID RESPONSE VASCULAR ACCESS NOTE       Bed:33539/31041 A    Single lumen 18G, 10CM midline placed in the right basilic vein. Needle advanced into the vessel under real time ultrasound guidance.    Attempts: 1  Max dwell date: 8/1/2024  Lot number: [id number]     Call [phone] for concerns or assistance.    Glendy Dumas RN

## 2024-07-02 NOTE — ASSESSMENT & PLAN NOTE
Staph epi bacteremia on blood cx, blood cx from 7/1 with NGTD.      --Continue vanc x14 days per ID recs  --If bacteremia persists, port may be source

## 2024-07-02 NOTE — PLAN OF CARE
Problem: Adult Inpatient Plan of Care  Goal: Plan of Care Review  Outcome: Progressing  Flowsheets (Taken 7/1/2024 1937)  Plan of Care Reviewed With: patient  Goal: Patient-Specific Goal (Individualized)  Outcome: Progressing  Goal: Absence of Hospital-Acquired Illness or Injury  Outcome: Progressing  Intervention: Identify and Manage Fall Risk  Flowsheets (Taken 7/1/2024 1937)  Safety Promotion/Fall Prevention:   assistive device/personal item within reach   side rails raised x 2   instructed to call staff for mobility  Intervention: Prevent Skin Injury  Flowsheets (Taken 7/1/2024 1937)  Body Position: position changed independently  Skin Protection: incontinence pads utilized  Device Skin Pressure Protection: tubing/devices free from skin contact  Intervention: Prevent and Manage VTE (Venous Thromboembolism) Risk  Flowsheets (Taken 7/1/2024 1937)  VTE Prevention/Management: bleeding risk assessed  Intervention: Prevent Infection  Flowsheets (Taken 7/1/2024 1937)  Infection Prevention: single patient room provided  Goal: Optimal Comfort and Wellbeing  Outcome: Progressing  Intervention: Monitor Pain and Promote Comfort  Flowsheets (Taken 7/1/2024 1937)  Pain Management Interventions: pain management plan reviewed with patient/caregiver  Intervention: Provide Person-Centered Care  Flowsheets (Taken 7/1/2024 1937)  Trust Relationship/Rapport:   care explained   choices provided   thoughts/feelings acknowledged    Patient alert and oriented x4; care plan discussed with patient; call light and personal belongings within reach; free from falls/injuries during the shift; pain managed via oral and IV pain medications; 1 x dose of lasix 60 mg given for bilateral U/L extremity swelling; patient able to express needs, no needs at this time; will continue with poc.

## 2024-07-02 NOTE — SUBJECTIVE & OBJECTIVE
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    Asthma     Depression     Hypertension     Morbid obesity     Opioid dependence 2017    Pneumonia due to Streptococcus pneumoniae 2017    Right lower lobe pneumonia 2017    Sepsis due to Streptococcus pneumoniae 2017    Sickle cell-beta thalassemia disease with pain     Trigeminal neuralgia      Past Surgical History:   Procedure Laterality Date     SECTION      TONSILLECTOMY      TUBAL LIGATION       Social History     Tobacco Use    Smoking status: Never    Smokeless tobacco: Never   Substance Use Topics    Alcohol use: No    Drug use: Yes     Types: Marijuana     Comment: periodically      Review of patient's allergies indicates:  No Known Allergies    Medications: I have reviewed the current medication administration record.    Medications Prior to Admission   Medication Sig Dispense Refill Last Dose    amLODIPine (NORVASC) 10 MG tablet TAKE 1 TABLET BY MOUTH EVERY DAY 90 tablet 0 2024    carvediloL (COREG) 25 MG tablet TAKE 1 TABLET BY MOUTH TWICE A  tablet 2 2024    acetaminophen (TYLENOL) 500 MG tablet Take 1,000 mg by mouth daily as needed for Pain.       albuterol (VENTOLIN HFA) 90 mcg/actuation inhaler Inhale 2 puffs into the lungs every 6 (six) hours as needed for Wheezing or Shortness of Breath. Rescue 18 g 2     albuterol-ipratropium (DUO-NEB) 2.5 mg-0.5 mg/3 mL nebulizer solution Take 3 mLs by nebulization every 6 (six) hours as needed for Wheezing or Shortness of Breath. Rescue 360 mL 2     ascorbic acid (VITAMIN C ORAL) Take 1 capsule by mouth once daily.       carBAMazepine (TEGRETOL) 200 mg tablet TAKE 4 TABLETS BY MOUTH TWICE DAILY 720 tablet 1     ELIQUIS 5 mg Tab TAKE 1 TABLET BY MOUTH TWICE A DAY 60 tablet 3 Unknown    FLUoxetine 40 MG capsule TAKE 1 CAPSULE BY MOUTH EVERY DAY 90 capsule 1      fluticasone propionate (FLONASE) 50 mcg/actuation nasal spray INSTILL 1 SPRAY INTO EACH NOSTRIL EVERY DAY 16 mL 11     folic acid (FOLVITE) 1 MG tablet Take 1 tablet (1 mg total) by mouth once daily. 30 tablet 11     gabapentin (NEURONTIN) 400 MG capsule TAKE 2 CAPSULES BY MOUTH 3 TIMES A  capsule 2     glutamine, sickle cell, (ENDARI) 5 gram PwPk Take 3 packets (15 g) by mouth 2 (two) times daily. 180 packet 5     hydroCHLOROthiazide (HYDRODIURIL) 25 MG tablet TAKE 1 TABLET BY MOUTH EVERY DAY 90 tablet 3     hydroxyurea (HYDREA) 500 mg Cap TAKE 2 CAPSULES (1,000 MG TOTAL) BY MOUTH 2 (TWO) TIMES DAILY. 120 capsule 5     ondansetron (ZOFRAN) 8 MG tablet TAKE 1 TABLET BY MOUTH EVERY 12 HOURS AS NEEDED FOR NAUSEA 30 tablet 2     potassium chloride SA (K-DUR,KLOR-CON) 20 MEQ tablet Take 1 tablet (20 mEq total) by mouth once daily. 4 tablet 0     promethazine (PHENERGAN) 25 MG tablet Take 1 tablet (25 mg total) by mouth every 6 (six) hours as needed for Nausea. 30 tablet 2     senna-docusate 8.6-50 mg (PERICOLACE) 8.6-50 mg per tablet Take 1 tablet by mouth 2 (two) times daily as needed for Constipation. (Patient taking differently: Take 1 tablet by mouth daily as needed for Constipation. )          Review of Systems   Constitutional:  Positive for activity change.   HENT:  Positive for drooling.    Respiratory:  Negative for shortness of breath.    Gastrointestinal:  Negative for vomiting.   Skin:  Negative for color change and pallor.   Neurological:  Positive for weakness.   Psychiatric/Behavioral:  Positive for decreased concentration.      Objective:     Vital Signs (Most Recent):  Temp: 99.2 °F (37.3 °C) (07/02/24 1240)  Pulse: 87 (07/02/24 1240)  Resp: 13 (07/02/24 1240)  BP: 126/78 (07/02/24 1240)  SpO2: (!) 93 % (07/02/24 1240)    Vital Signs Range (Last 24H):  Temp:  [97.7 °F (36.5 °C)-99.2 °F (37.3 °C)]   Pulse:  [80-92]   Resp:  [9-19]   BP: ()/(60-93)   SpO2:  [92 %-97 %]        Physical  "Exam  Constitutional:       Appearance: She is obese.   HENT:      Head: Normocephalic.      Right Ear: External ear normal.      Left Ear: External ear normal.      Nose: Nose normal.   Cardiovascular:      Rate and Rhythm: Normal rate.   Pulmonary:      Effort: Pulmonary effort is normal.   Neurological:      Mental Status: She is lethargic.              Neurological Exam:   LOC: drowsy  Attention Span: poor  Language: No aphasia  Articulation: Dysarthria  Orientation: Person, Place, Time   Motor: Arm left  Normal 5/5  Leg left  Paresis: 4/5  Arm right  Normal 5/5  Leg right Paresis: 4/5      Laboratory:  CMP:   Recent Labs   Lab 07/02/24  0533   CALCIUM 8.5*      K 4.2   CO2 26      BUN 13   CREATININE 1.5*     CBC:   Recent Labs   Lab 07/02/24  0533 07/02/24  1440   WBC 6.72  --    RBC 3.93*  --    HGB 9.9*  --    HCT 30.5* 31*     --    MCV 78*  --    MCH 25.2*  --    MCHC 32.5  --      Lipid Panel: No results for input(s): "CHOL", "LDLCALC", "HDL", "TRIG" in the last 168 hours.  Coagulation:   Recent Labs   Lab 06/26/24  1345   INR 1.0   APTT 25.5     Hgb A1C: No results for input(s): "HGBA1C" in the last 168 hours.  TSH:   Recent Labs   Lab 06/26/24  1345   TSH 0.173*       Diagnostic Results:      Brain/Vessel Imaging:    CTA stroke MP 7/2/24  No evidence of acute hemorrhage or major vascular distribution infarct.     CT arteriogram mildly confounded by suboptimal bolus timing as well as patient motion artifact , but there is no evidence of high-grade stenosis or intracranial large vessel occlusion.     Extensive reflux of venous contrast throughout the neck, presumably related to left brachiocephalic venous stenosis.     Further workup as warranted clinically.       Cardiac Evaluation:     TTE 6/27/24    Left Ventricle The left ventricle is normal in size. Mildly increased ventricular mass. Mildly increased wall thickness. There is mild eccentric hypertrophy. Regional wall motion " abnormalities present. See diagram for wall motion findings. Septal motion is abnormal. There is normal systolic function with a visually estimated ejection fraction of 55 - 60%. Ejection fraction by visual approximation is 58%. There is normal diastolic function.   Right Ventricle Normal right ventricular cavity size. Wall thickness is normal. Right ventricle wall motion  is normal. Systolic function is normal.   Left Atrium Normal left atrial size.   Right Atrium Normal right atrial size.   Aortic Valve The aortic valve is structurally normal. There is normal leaflet mobility. Aortic valve peak velocity is 1.52 m/s. Mean gradient is 5 mmHg.   Mitral Valve The mitral valve is structurally normal. There is normal leaflet mobility. There is mild regurgitation.   Tricuspid Valve The tricuspid valve is structurally normal. There is normal leaflet mobility. There is mild regurgitation.   Pulmonic Valve The pulmonic valve is structurally normal. There is normal leaflet mobility. There is trace regurgitation.   IVC/SVC Normal venous pressure at 3 mmHg.   Ascending Aorta Aortic root is normal in size measuring 3.48 cm. Ascending aorta is mildly dilated measuring 3.82 cm.   Pericardium and Other Findings There is no pericardial effusion.   Pulmonary Artery The estimated pulmonary artery systolic pressure is 26 mmHg.

## 2024-07-02 NOTE — SUBJECTIVE & OBJECTIVE
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    Asthma     Depression     Hypertension     Morbid obesity     Opioid dependence 2017    Pneumonia due to Streptococcus pneumoniae 2017    Right lower lobe pneumonia 2017    Sepsis due to Streptococcus pneumoniae 2017    Sickle cell-beta thalassemia disease with pain     Trigeminal neuralgia        Past Surgical History:   Procedure Laterality Date     SECTION      TONSILLECTOMY      TUBAL LIGATION         Review of patient's allergies indicates:  No Known Allergies    Family History       Problem Relation (Age of Onset)    Diabetes Mother    Heart disease Mother, Father          Tobacco Use    Smoking status: Never    Smokeless tobacco: Never   Substance and Sexual Activity    Alcohol use: No    Drug use: Yes     Types: Marijuana     Comment: periodically     Sexual activity: Not Currently     Birth control/protection: See Surgical Hx      Review of Systems   Unable to perform ROS: Acuity of condition     Objective:     Vital Signs (Most Recent):  Temp: 98.6 °F (37 °C) (24 1530)  Pulse: 86 (24 1530)  Resp: 10 (24 1530)  BP: (!) 151/84 (24 1530)  SpO2: 100 % (24 1530) Vital Signs (24h Range):  Temp:  [97.7 °F (36.5 °C)-99.2 °F (37.3 °C)] 98.6 °F (37 °C)  Pulse:  [80-92] 86  Resp:  [9-19] 10  SpO2:  [92 %-100 %] 100 %  BP: ()/(60-93) 151/84   Weight: 86.2 kg (190 lb 0.6 oz)  Body mass index is 34.76 kg/m².      Intake/Output Summary (Last 24 hours) at 2024 1821  Last data filed at 2024 1947  Gross per 24 hour   Intake 3088.53 ml   Output --   Net 3088.53 ml          Physical Exam  Constitutional:       Appearance: She is not toxic-appearing.      Comments: Obtunded   HENT:      Head: Normocephalic and atraumatic.   Cardiovascular:      Rate and Rhythm: Normal rate and regular rhythm.      Heart  sounds: S1 normal and S2 normal. No murmur heard.  Pulmonary:      Effort: Pulmonary effort is normal. No tachypnea or respiratory distress.      Breath sounds: Normal breath sounds. No decreased breath sounds, wheezing, rhonchi or rales.   Abdominal:      General: Abdomen is protuberant.      Palpations: Abdomen is soft.   Musculoskeletal:      Cervical back: Neck supple.   Neurological:      Mental Status: She is lethargic.      Comments: Obtunded; arouses to aggressive sternal rub and mumbles intermittently            Vents:     Lines/Drains/Airways       Drain  Duration             Female External Urinary Catheter w/ Suction 07/01/24 1230 1 day              Peripheral Intravenous Line  Duration                  Midline Catheter - Single Lumen 07/02/24 Right basilic vein (medial side of arm) 18g x 10cm <1 day         Peripheral IV - Single Lumen 07/01/24 1915 20 G 1 3/4 in Yes Anterior;Left Upper Arm <1 day                  Significant Labs:    CBC/Anemia Profile:  Recent Labs   Lab 07/01/24  0914 07/02/24  0533 07/02/24  1440 07/02/24  1546   WBC 7.19 6.72  --  8.05   HGB 11.8* 9.9*  --  11.4*   HCT 35.7* 30.5* 31* 34.7*    278  --  328   MCV 78* 78*  --  77*   RDW 21.6* 20.8*  --  21.3*        Chemistries:  Recent Labs   Lab 07/01/24  0914 07/01/24  1425 07/02/24  0533 07/02/24  1546 07/02/24  1642     --  142 142 141   K 4.3  --  4.2 3.8 4.2     --  107 106 105   CO2 20*  --  26 26 24   BUN 9  --  13 10 11   CREATININE 1.0   < > 1.5* 1.4 1.5*   CALCIUM 9.6  --  8.5* 9.1 8.9   ALBUMIN  --   --   --  3.4* 3.3*   PROT  --   --   --  7.3 7.5   BILITOT  --   --   --  0.4 0.3   ALKPHOS  --   --   --  82 80   ALT  --   --   --  13 14   AST  --   --   --  30 37   MG 1.9  --  1.9  --   --     < > = values in this interval not displayed.       All pertinent labs within the past 24 hours have been reviewed.    Significant Imaging: I have reviewed all pertinent imaging results/findings within the past  24 hours.

## 2024-07-02 NOTE — CONSULTS
[facility] ([place])  Vascular Neurology  [facility]  Consult Note    Inpatient consult to Vascular (Stroke) Neurology  Consult performed by: Lonnie Mckeon NP  Consult ordered by: Vijaya Acosta MD        Assessment/Plan:     Patient is a 44 y.o. year old female with:    Encephalopathy  44 y.o. yo female with PMHx  who is currently admitted to  service with chest and leg pain, hx of acute chest syndrome secondary to sickle cell anemia. LKN 12:15 pm. Stroke code called after patient found with decreased level of arousal. Received dilaudid 2 mg and morphine 15 mg around 10 am. Found minimally responsive at 2 pm, rapid response called. Narcan given- arousal improved-  moving all extremities, speech dysarthric/unintelligible.  Pt was transferred to stat CTA . Dr. Claudio reviewed images as acquired. No LVO. Exam non focal, patient able to answer simple questions and follow simple commands, after vigorous arousal. Generally weak upper extremities < lower extremities. Patient snoring and yawning in between efforts to provide vigorous stimulation. Low suspicious for stroke as cause of symptoms. DDx includes polypharmacy vs seizure. Recommend MRI brain to definitively rule out stroke    Antithrombotics for stroke prevention:continue Eliquis         FLOR (acute kidney injury)  -managed per primary team      Bacteremia  -managed per primary team    Morbid obesity  -stroke risk factor   Morbid Obesity  - Patient's Body mass index is 34.76 kg/m². on admit.   - Patient counseled on need for weight loss.   - Patient counseled on risks of increased mortality related to obesity and increased risk of diabetes, hypertension, pulmonary and breathing problems, arthritis from degeneration of joints, skin ulcers and infections and heart disease if does not have significant weight loss.   - Discussed with patient need for outpatient follow-up with primary care physician to assess best  strategy for patient to assist in weight loss.      Hypertension  -stroke risk factor  -keep SBP <220 until acute stroke ruled out definitively         STROKE DOCUMENTATION     Acute Stroke Times   Last Known Normal Date: 07/02/24  Last Known Normal Time: 1215  Symptom Onset Date: 07/02/24  Unknown Symptom Onset Time: Unknown Time  Stroke Team Called Date: 07/02/24  Stroke Team Called Time: 1413  Stroke Team Arrival Date: 07/02/24  Stroke Team Arrival Time: 1418  CT Interpretation Time: 1422  Thrombolytic Therapy Recommended: No  CTA Interpretation Time: 1426  Thrombectomy Recommended: No    NIH Scale:  1a. Level of Consciousness: 2-->Not alert, requires repeated stimulation to attend, or is obtunded and requires strong or painful stimulation to make movements (not stereotyped)  1b. LOC Questions: 1-->Answers one question correctly  1c. LOC Commands: 0-->Performs both tasks correctly  2. Best Gaze: 0-->Normal  3. Visual: 0-->No visual loss  4. Facial Palsy: 0-->Normal symmetrical movements  5a. Motor Arm, Left: 1-->Drift, limb holds 90 (or 45) degrees, but drifts down before full 10 seconds, does not hit bed or other support  5b. Motor Arm, Right: 1-->Drift, limb holds 90 (or 45) degrees, but drifts down before full 10 secs, does not hit bed or other support  6a. Motor Leg, Left: 2-->Some effort against gravity, leg falls to bed by 5 secs, but has some effort against gravity  6b. Motor Leg, Right: 2-->Some effort against gravity, leg falls to bed by 5 secs, but has some effort against gravity  7. Limb Ataxia: 0-->Absent  8. Sensory: 0-->Normal, no sensory loss  9. Best Language: 0-->No aphasia, normal  10. Dysarthria: 2-->Severe dysarthria, patients speech is so slurred as to be unintelligible in the absence of or out of proportion to any dysphasia, or is mute/anarthric  11. Extinction and Inattention (formerly Neglect): 0-->No abnormality  Total (NIH Stroke Scale): 11    Modified [place] Score: 2  [place] Coma  Scale:12   ABCD2 Score:    [id number] Score:   HAS -BLED Score:   ICH Score:   Hunt & Olguin Classification:       Thrombolysis Candidate? No, Strong suspicion for stroke mimic or alternative diagnosis     Delays to Thrombolysis?  Not Applicable    Interventional Revascularization Candidate?   Is the patient eligible for mechanical endovascular reperfusion (TYSON)?  No; No large vessel occlusion identified on imaging     Delays to Thrombectomy? Not Applicable    Hemorrhagic change of an Ischemic Stroke: Does this patient have an ischemic stroke with hemorrhagic changes? No     Subjective:     History of Present Illness:    Nazanin Malone is a 44 y.o. 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 Pt was LKN at approximately 12:15 pm when nurse did patient rounds. Around 2pm, nurse noted patient to be minimally responsive. A rapid response was called. Rapid team administered narcan at bedside, patient then become more responsive- localizing pain, speech dysarthric. An inpatient stroke code was called and patient taken to stat CTA stroke MP.      Pt is taking Eliquis  in patient.           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    Asthma     Depression     Hypertension     Morbid obesity     Opioid dependence 2017    Pneumonia due to Streptococcus pneumoniae 2017    Right lower lobe pneumonia 2017    Sepsis due to Streptococcus pneumoniae 2017    Sickle cell-beta thalassemia disease with pain     Trigeminal neuralgia      Past Surgical History:   Procedure Laterality Date     SECTION      TONSILLECTOMY      TUBAL LIGATION       Social History     Tobacco Use    Smoking status: Never     Smokeless tobacco: Never   Substance Use Topics    Alcohol use: No    Drug use: Yes     Types: Marijuana     Comment: periodically      Review of patient's allergies indicates:  No Known Allergies    Medications: I have reviewed the current medication administration record.    Medications Prior to Admission   Medication Sig Dispense Refill Last Dose    amLODIPine (NORVASC) 10 MG tablet TAKE 1 TABLET BY MOUTH EVERY DAY 90 tablet 0 6/26/2024    carvediloL (COREG) 25 MG tablet TAKE 1 TABLET BY MOUTH TWICE A  tablet 2 6/26/2024    acetaminophen (TYLENOL) 500 MG tablet Take 1,000 mg by mouth daily as needed for Pain.       albuterol (VENTOLIN HFA) 90 mcg/actuation inhaler Inhale 2 puffs into the lungs every 6 (six) hours as needed for Wheezing or Shortness of Breath. Rescue 18 g 2     albuterol-ipratropium (DUO-NEB) 2.5 mg-0.5 mg/3 mL nebulizer solution Take 3 mLs by nebulization every 6 (six) hours as needed for Wheezing or Shortness of Breath. Rescue 360 mL 2     ascorbic acid (VITAMIN C ORAL) Take 1 capsule by mouth once daily.       carBAMazepine (TEGRETOL) 200 mg tablet TAKE 4 TABLETS BY MOUTH TWICE DAILY 720 tablet 1     ELIQUIS 5 mg Tab TAKE 1 TABLET BY MOUTH TWICE A DAY 60 tablet 3 Unknown    FLUoxetine 40 MG capsule TAKE 1 CAPSULE BY MOUTH EVERY DAY 90 capsule 1     fluticasone propionate (FLONASE) 50 mcg/actuation nasal spray INSTILL 1 SPRAY INTO EACH NOSTRIL EVERY DAY 16 mL 11     folic acid (FOLVITE) 1 MG tablet Take 1 tablet (1 mg total) by mouth once daily. 30 tablet 11     gabapentin (NEURONTIN) 400 MG capsule TAKE 2 CAPSULES BY MOUTH 3 TIMES A  capsule 2     glutamine, sickle cell, (ENDARI) 5 gram PwPk Take 3 packets (15 g) by mouth 2 (two) times daily. 180 packet 5     hydroCHLOROthiazide (HYDRODIURIL) 25 MG tablet TAKE 1 TABLET BY MOUTH EVERY DAY 90 tablet 3     hydroxyurea (HYDREA) 500 mg Cap TAKE 2 CAPSULES (1,000 MG TOTAL) BY MOUTH 2 (TWO) TIMES DAILY. 120 capsule 5     ondansetron  (ZOFRAN) 8 MG tablet TAKE 1 TABLET BY MOUTH EVERY 12 HOURS AS NEEDED FOR NAUSEA 30 tablet 2     potassium chloride SA (K-DUR,KLOR-CON) 20 MEQ tablet Take 1 tablet (20 mEq total) by mouth once daily. 4 tablet 0     promethazine (PHENERGAN) 25 MG tablet Take 1 tablet (25 mg total) by mouth every 6 (six) hours as needed for Nausea. 30 tablet 2     senna-docusate 8.6-50 mg (PERICOLACE) 8.6-50 mg per tablet Take 1 tablet by mouth 2 (two) times daily as needed for Constipation. (Patient taking differently: Take 1 tablet by mouth daily as needed for Constipation. )          Review of Systems   Constitutional:  Positive for activity change.   HENT:  Positive for drooling.    Respiratory:  Negative for shortness of breath.    Gastrointestinal:  Negative for vomiting.   Skin:  Negative for color change and pallor.   Neurological:  Positive for weakness.   Psychiatric/Behavioral:  Positive for decreased concentration.      Objective:     Vital Signs (Most Recent):  Temp: 99.2 °F (37.3 °C) (07/02/24 1240)  Pulse: 87 (07/02/24 1240)  Resp: 13 (07/02/24 1240)  BP: 126/78 (07/02/24 1240)  SpO2: (!) 93 % (07/02/24 1240)    Vital Signs Range (Last 24H):  Temp:  [97.7 °F (36.5 °C)-99.2 °F (37.3 °C)]   Pulse:  [80-92]   Resp:  [9-19]   BP: ()/(60-93)   SpO2:  [92 %-97 %]        Physical Exam  Constitutional:       Appearance: She is obese.   HENT:      Head: Normocephalic.      Right Ear: External ear normal.      Left Ear: External ear normal.      Nose: Nose normal.   Cardiovascular:      Rate and Rhythm: Normal rate.   Pulmonary:      Effort: Pulmonary effort is normal.   Neurological:      Mental Status: She is lethargic.              Neurological Exam:   LOC: drowsy  Attention Span: poor  Language: No aphasia  Articulation: Dysarthria  Orientation: Person, Place, Time   Motor: Arm left  Normal 5/5  Leg left  Paresis: 4/5  Arm right  Normal 5/5  Leg right Paresis: 4/5      Laboratory:  CMP:   Recent Labs   Lab 07/02/24  7870  "  CALCIUM 8.5*      K 4.2   CO2 26      BUN 13   CREATININE 1.5*     CBC:   Recent Labs   Lab 07/02/24  0533 07/02/24  1440   WBC 6.72  --    RBC 3.93*  --    HGB 9.9*  --    HCT 30.5* 31*     --    MCV 78*  --    MCH 25.2*  --    MCHC 32.5  --      Lipid Panel: No results for input(s): "CHOL", "LDLCALC", "HDL", "TRIG" in the last 168 hours.  Coagulation:   Recent Labs   Lab 06/26/24  1345   INR 1.0   APTT 25.5     Hgb A1C: No results for input(s): "HGBA1C" in the last 168 hours.  TSH:   Recent Labs   Lab 06/26/24  1345   TSH 0.173*       Diagnostic Results:      Brain/Vessel Imaging:    CTA stroke MP 7/2/24  No evidence of acute hemorrhage or major vascular distribution infarct.     CT arteriogram mildly confounded by suboptimal bolus timing as well as patient motion artifact , but there is no evidence of high-grade stenosis or intracranial large vessel occlusion.     Extensive reflux of venous contrast throughout the neck, presumably related to left brachiocephalic venous stenosis.     Further workup as warranted clinically.       Cardiac Evaluation:     TTE 6/27/24    Left Ventricle The left ventricle is normal in size. Mildly increased ventricular mass. Mildly increased wall thickness. There is mild eccentric hypertrophy. Regional wall motion abnormalities present. See diagram for wall motion findings. Septal motion is abnormal. There is normal systolic function with a visually estimated ejection fraction of 55 - 60%. Ejection fraction by visual approximation is 58%. There is normal diastolic function.   Right Ventricle Normal right ventricular cavity size. Wall thickness is normal. Right ventricle wall motion  is normal. Systolic function is normal.   Left Atrium Normal left atrial size.   Right Atrium Normal right atrial size.   Aortic Valve The aortic valve is structurally normal. There is normal leaflet mobility. Aortic valve peak velocity is 1.52 m/s. Mean gradient is 5 mmHg.   Mitral " Valve The mitral valve is structurally normal. There is normal leaflet mobility. There is mild regurgitation.   Tricuspid Valve The tricuspid valve is structurally normal. There is normal leaflet mobility. There is mild regurgitation.   Pulmonic Valve The pulmonic valve is structurally normal. There is normal leaflet mobility. There is trace regurgitation.   IVC/SVC Normal venous pressure at 3 mmHg.   Ascending Aorta Aortic root is normal in size measuring 3.48 cm. Ascending aorta is mildly dilated measuring 3.82 cm.   Pericardium and Other Findings There is no pericardial effusion.   Pulmonary Artery The estimated pulmonary artery systolic pressure is 26 mmHg.       Lonnie Mckeon NP  [facility]  Department of Vascular Neurology   [facility])

## 2024-07-02 NOTE — CODE/ RAPID DOCUMENTATION
"  RAPID RESPONSE NURSE STROKE CODE NOTE         Admit Date: 2024  LOS: 5  Code Status: Full Code   Date of Consult: 2024  : 1980  Age: 44 y.o.  Weight:   Wt Readings from Last 1 Encounters:   24 86.2 kg (190 lb 0.6 oz)     Sex: female  Race: Black or    Bed: 37394/57264 A:   MRN: [id number]  Time Rapid Response Team page Received: 1416  Time Rapid Response Team at Bedside: 1405  Time Rapid Response Team left Bedside: 1515  Was the patient discharged from an ICU this admission? No   Was the patient discharged from a PACU within last 24 hours? No   Did the patient receive conscious sedation/general anesthesia in last 24 hours? No  Was the patient in the ED within the past 24 hours? No  Was the patient on NIPPV within the past 24 hours? No   Did this progress into an ARC or CPA: No  Attending Physician: Vijaya Acosta MD  Primary Service: [facility]       SITUATION    Notified by bedside RN via phone call.  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    ASSESSMENT    Last VS: /78 (BP Location: Right forearm, Patient Position: Lying)   Pulse 87   Temp 99.2 °F (37.3 °C) (Oral)   Resp 13   Ht 5' 2" (1.575 m)   Wt 86.2 kg (190 lb 0.6 oz)   SpO2 (!) 93%   Breastfeeding No   BMI 34.76 kg/m²     24H Vital Sign Range:  Temp:  [97.7 °F (36.5 °C)-99.2 °F (37.3 °C)]   Pulse:  [80-92]   Resp:  [9-19]   BP: ()/(60-93)   SpO2:  [92 %-97 %]     Last know well time: 1215    Glucose time: 1400   Glucose result: 92    Physical " Exam  Vitals and nursing note reviewed.   Constitutional:       Appearance: She is ill-appearing.      Interventions: Nasal cannula in place.   Eyes:      Pupils: Pupils are equal, round, and reactive to light.      Right eye: Pupil is sluggish.      Left eye: Pupil is sluggish.   Neurological:      Mental Status: She is lethargic and disoriented.      GCS: GCS eye subscore is 2. GCS verbal subscore is 2. GCS motor subscore is 5.   Psychiatric:         Speech: Speech is rapid and pressured.         Time Stroke Code initiated: 1410  Stroke team Arrival time: 1420  Stroke Code activation triggers: Dysphagia and AMS  Vascular Neurology provider you spoke with:  Lonnie Mckeon NP    Time arrived at CT: 1415  Time CT completed: 1420    VAN positive or negative: positive    Thrombolytic decision: No      IR decision: No      RECOMMENDATIONS    We recommend: -full neuro consult  -continuous telemetry   -maintain IV access  -eeg     FOLLOW-UP/PLAN    Call the Rapid Response Nurse, Adam Hanks RN at [phone] for additional questions or concerns.    PHYSICIAN ESCALATION    Orders received and case discussed with Dr. Acosta     Disposition: Remain in room 1481.

## 2024-07-02 NOTE — CONSULTS
Pharmacokinetic Assessment Follow Up: IV Vancomycin    Vancomycin Regimen Assessment/ Plan:    Random level resulted as 18.4 mcg/mL (~25 hours post dose)  Goal level 15-20 mcg/mL for bacteremia  Patient developed FLOR, SCr 1.1 > 1.5 mg/dL  Will discontinue schedule Vancomycin and start pulse dosing  Vancomycin 750 mg IV x 1 this morning  Next random level due 7/3 at 0500 with AM labs  Re-dose when level < 20 mcg/mL    Drug levels (last 3 results):  Recent Labs   Lab Result Units 06/29/24  1417 07/01/24 1425 07/02/24  0533   Vancomycin, Random ug/mL  --   --  18.4   Vancomycin-Trough ug/mL 12.9 25.8*  --        Pharmacy will continue to follow and monitor vancomycin.    Please contact pharmacy at extension [phone] for questions regarding this assessment.    Thank you for the consult,   Dayami Knott, PharmD       Patient brief summary:  Nazanin Malone is a 44 y.o. female initiated on antimicrobial therapy with IV Vancomycin for treatment of bacteremia    Drug Allergies:   Review of patient's allergies indicates:  No Known Allergies    Actual Body Weight:   86.2 kg    Renal Function:   Estimated Creatinine Clearance: 47.7 mL/min (A) (based on SCr of 1.5 mg/dL (H)).,     Dialysis Method (if applicable):  N/A    CBC (last 72 hours):  Recent Labs   Lab Result Units 07/01/24  0914 07/02/24  0533   WBC K/uL 7.19 6.72   Hemoglobin g/dL 11.8* 9.9*   Hematocrit % 35.7* 30.5*   Platelets K/uL 270 278   Gran % % 62.6  --    Lymph % % 24.9  --    Mono % % 9.9  --    Eosinophil % % 1.9  --    Basophil % % 0.6  --    Differential Method  Automated  --        Metabolic Panel (last 72 hours):  Recent Labs   Lab Result Units 07/01/24  0914 07/01/24  1425 07/02/24  0533   Sodium mmol/L 138  --  142   Potassium mmol/L 4.3  --  4.2   Chloride mmol/L 108  --  107   CO2 mmol/L 20*  --  26   Glucose mg/dL 90  --  94   BUN mg/dL 9  --  13   Creatinine mg/dL 1.0 1.1 1.5*   Magnesium mg/dL 1.9  --  1.9       Vancomycin  Administrations:  vancomycin given in the last 96 hours                     vancomycin 750 mg in D5W 250 mL IVPB (Vial-Mate) (mg) 750 mg New Bag 07/02/24 0554    vancomycin (VANCOCIN) 1,000 mg in D5W 250 mL IVPB (Vial-Mate) ()  Restarted 07/01/24 0413     1,000 mg New Bag  0356     1,000 mg New Bag 06/30/24 1600     1,000 mg New Bag  0354    vancomycin 1,250 mg in D5W 250 mL IVPB (Vial-Mate) (mg) 1,250 mg New Bag 06/29/24 1542     1,250 mg New Bag 06/28/24 1558                    Microbiologic Results:  Microbiology Results (last 7 days)       Procedure Component Value Units Date/Time    Blood culture [[id number]] Collected: 06/27/24 1544    Order Status: Completed Specimen: Blood Updated: 07/01/24 1812     Blood Culture, Routine No Growth to date      No Growth to date      No Growth to date      No Growth to date      No Growth to date    Narrative:      Collection has been rescheduled by DF2 at 06/27/2024 14:22 Reason:   Patient unavailable.  Eileen RN ext [phone].  Collection has been rescheduled by DF2 at 06/27/2024 14:22 Reason:   Patient unavailable.  Eileen RN ext [phone].    Blood culture [[id number]] Collected: 06/27/24 1532    Order Status: Completed Specimen: Blood Updated: 07/01/24 1812     Blood Culture, Routine No Growth to date      No Growth to date      No Growth to date      No Growth to date      No Growth to date    Narrative:      Collection has been rescheduled by DF2 at 06/27/2024 14:22 Reason:   Patient unavailable.  Eileen RN ext [phone].  Collection has been rescheduled by DF2 at 06/27/2024 14:22 Reason:   Patient unavailable.  Eileen RN ext [phone].    Respiratory Infection Panel (PCR), Nasopharyngeal [[id number]] Collected: 06/29/24 1030    Order Status: Completed Specimen: Nasopharyngeal Swab Updated: 06/30/24 0026     Respiratory Infection Panel Source NP Swab     Adenovirus Not Detected     Coronavirus 229E, Common Cold Virus Not Detected     Coronavirus HKU1, Common Cold Virus Not Detected      Coronavirus NL63, Common Cold Virus Not Detected     Coronavirus OC43, Common Cold Virus Not Detected     Comment: The Coronavirus strains detected in this test cause the common cold.  These strains are not the COVID-19 (novel Coronavirus)strain   associated with the respiratory disease outbreak.          SARS-CoV2 (COVID-19) Qualitative PCR Not Detected     Human Metapneumovirus Not Detected     Human Rhinovirus/Enterovirus Not Detected     Influenza A (subtypes H1, H1-2009,H3) Not Detected     Influenza B Not Detected     Parainfluenza Virus 1 Not Detected     Parainfluenza Virus 2 Not Detected     Parainfluenza Virus 3 Not Detected     Parainfluenza Virus 4 Not Detected     Respiratory Syncytial Virus Not Detected     Bordetella Parapertussis (LJ1137) Not Detected     Bordetella pertussis (ptxP) Not Detected     Chlamydia pneumoniae Not Detected     Mycoplasma pneumoniae Not Detected    Narrative:      Assay not valid for lower respiratory specimens, alternate  testing required.    Blood culture #2 **CANNOT BE ORDERED STAT** [[id number]]  (Abnormal)  (Susceptibility) Collected: 06/26/24 1346    Order Status: Completed Specimen: Blood from Peripheral, Antecubital, Left Updated: 06/29/24 1041     Blood Culture, Routine Gram stain kimberly bottle: Gram positive cocci in clusters resembling Staph      Results called to and read back by: Eileen Soler LPN 06/27/2024  09:34      Gram stain aer bottle: Gram positive cocci in clusters resembling Staph      Positive results previously called 06/27/2024  11:27      STAPHYLOCOCCUS EPIDERMIDIS    Blood culture #1 **CANNOT BE ORDERED STAT** [[id number]]  (Abnormal)  (Susceptibility) Collected: 06/26/24 1346    Order Status: Completed Specimen: Blood from Peripheral, Antecubital, Right Updated: 06/29/24 1040     Blood Culture, Routine Gram stain aer bottle: Gram positive cocci in clusters resembling Staph      Results called to and read back by: Eileen Soler LPN 06/27/2024  09:32       Gram stain kimberly bottle: Gram positive cocci in clusters resembling Staph      Positive results previously called 06/27/2024  11:26      STAPHYLOCOCCUS EPIDERMIDIS    Respiratory Infection Panel (PCR), Nasopharyngeal [[id number]] Collected: 06/29/24 0802    Order Status: Canceled Specimen: Nasopharyngeal Swab     MRSA/SA Rapid ID by PCR from Blood culture [[id number]] Collected: 06/26/24 1346    Order Status: Completed Updated: 06/27/24 1111     Staph aureus ID by PCR Negative     Methicillin Resistant ID by PCR Negative

## 2024-07-02 NOTE — ASSESSMENT & PLAN NOTE
CTA on 6/26 with no large central/saddle PE, nondiagnostic beyond level of R and L pulmonary arteries.  History of PE on eliquis    --Continue eliquis

## 2024-07-02 NOTE — ASSESSMENT & PLAN NOTE
Rapid response called 7/2 for AMS. Initial concern for AMS 2/2 opioid administration after receiving 2mg Dilaudid but no improvement after repeated Narcan administration. Stroke code called; CTA negative. No focal deficits. She appeared to be post-ictal (no hx of seizures).     --Follow up EEG  --No narcotics/sedatives  --Follow up tegretol level  --Serial VBGs; may need airway protection if mental status worsens

## 2024-07-02 NOTE — ASSESSMENT & PLAN NOTE
History of Acute Chest Syndrome and Pulmonary embolism.  Came in to hospital with chest pain.      --Will need to reestablish care with heme/onc  --Multimodal pain control, had been on dilaudid 2mg now obtunded.  Will hold in setting of AMS, resume when appropriate

## 2024-07-02 NOTE — HPI
Nazanin Malone is a 44 y.o. 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 Pt was LKN at approximately 12:15 pm when nurse did patient rounds. Around 2pm, nurse noted patient to be minimally responsive. A rapid response was called. Rapid team administered narcan at bedside, patient then become more responsive- localizing pain, speech dysarthric. An inpatient stroke code was called and patient taken to stat CTA stroke MP.      Pt is taking Eliquis  in patient.

## 2024-07-03 ENCOUNTER — DOCUMENTATION ONLY (OUTPATIENT)
Dept: NEUROLOGY | Facility: CLINIC | Age: 44
End: 2024-07-03
Payer: MEDICARE

## 2024-07-03 PROBLEM — T42.1X1A TEGRETOL TOXICITY: Status: ACTIVE | Noted: 2024-07-03

## 2024-07-03 LAB
ALBUMIN SERPL BCP-MCNC: 2.9 G/DL (ref 3.5–5.2)
ALP SERPL-CCNC: 71 U/L (ref 55–135)
ALT SERPL W/O P-5'-P-CCNC: 14 U/L (ref 10–44)
ANION GAP SERPL CALC-SCNC: 7 MMOL/L (ref 8–16)
AST SERPL-CCNC: 31 U/L (ref 10–40)
BASOPHILS # BLD AUTO: 0.03 K/UL (ref 0–0.2)
BASOPHILS NFR BLD: 0.5 % (ref 0–1.9)
BILIRUB SERPL-MCNC: 0.3 MG/DL (ref 0.1–1)
BUN SERPL-MCNC: 10 MG/DL (ref 6–20)
CALCIUM SERPL-MCNC: 8.8 MG/DL (ref 8.7–10.5)
CARBAMAZEPINE SERPL-MCNC: 13.1 UG/ML (ref 4–12)
CARBAMAZEPINE SERPL-MCNC: 15.5 UG/ML (ref 4–12)
CARBAMAZEPINE SERPL-MCNC: 20.5 UG/ML (ref 4–12)
CHLORIDE SERPL-SCNC: 106 MMOL/L (ref 95–110)
CO2 SERPL-SCNC: 28 MMOL/L (ref 23–29)
CREAT SERPL-MCNC: 1 MG/DL (ref 0.5–1.4)
DIFFERENTIAL METHOD BLD: ABNORMAL
EOSINOPHIL # BLD AUTO: 0.1 K/UL (ref 0–0.5)
EOSINOPHIL NFR BLD: 2.3 % (ref 0–8)
ERYTHROCYTE [DISTWIDTH] IN BLOOD BY AUTOMATED COUNT: 20.7 % (ref 11.5–14.5)
EST. GFR  (NO RACE VARIABLE): >60 ML/MIN/1.73 M^2
GLUCOSE SERPL-MCNC: 98 MG/DL (ref 70–110)
HCT VFR BLD AUTO: 28.5 % (ref 37–48.5)
HGB BLD-MCNC: 9.8 G/DL (ref 12–16)
IMM GRANULOCYTES # BLD AUTO: 0.01 K/UL (ref 0–0.04)
IMM GRANULOCYTES NFR BLD AUTO: 0.2 % (ref 0–0.5)
LYMPHOCYTES # BLD AUTO: 2.2 K/UL (ref 1–4.8)
LYMPHOCYTES NFR BLD: 35.1 % (ref 18–48)
MAGNESIUM SERPL-MCNC: 2.1 MG/DL (ref 1.6–2.6)
MCH RBC QN AUTO: 25.5 PG (ref 27–31)
MCHC RBC AUTO-ENTMCNC: 34.4 G/DL (ref 32–36)
MCV RBC AUTO: 74 FL (ref 82–98)
MONOCYTES # BLD AUTO: 0.8 K/UL (ref 0.3–1)
MONOCYTES NFR BLD: 13.4 % (ref 4–15)
NEUTROPHILS # BLD AUTO: 3 K/UL (ref 1.8–7.7)
NEUTROPHILS NFR BLD: 48.5 % (ref 38–73)
NRBC BLD-RTO: 1 /100 WBC
PHOSPHATE SERPL-MCNC: 3.6 MG/DL (ref 2.7–4.5)
PLATELET # BLD AUTO: 280 K/UL (ref 150–450)
PMV BLD AUTO: 9.8 FL (ref 9.2–12.9)
POCT GLUCOSE: 106 MG/DL (ref 70–110)
POCT GLUCOSE: 135 MG/DL (ref 70–110)
POCT GLUCOSE: 83 MG/DL (ref 70–110)
POCT GLUCOSE: 86 MG/DL (ref 70–110)
POCT GLUCOSE: 87 MG/DL (ref 70–110)
POTASSIUM SERPL-SCNC: 3.8 MMOL/L (ref 3.5–5.1)
PROT SERPL-MCNC: 6.3 G/DL (ref 6–8.4)
RBC # BLD AUTO: 3.85 M/UL (ref 4–5.4)
SODIUM SERPL-SCNC: 141 MMOL/L (ref 136–145)
VANCOMYCIN SERPL-MCNC: 12.2 UG/ML
WBC # BLD AUTO: 6.13 K/UL (ref 3.9–12.7)

## 2024-07-03 PROCEDURE — 63600175 PHARM REV CODE 636 W HCPCS: Performed by: INTERNAL MEDICINE

## 2024-07-03 PROCEDURE — 25000003 PHARM REV CODE 250

## 2024-07-03 PROCEDURE — 21400001 HC TELEMETRY ROOM

## 2024-07-03 PROCEDURE — 80156 ASSAY CARBAMAZEPINE TOTAL: CPT | Mod: 91 | Performed by: NURSE PRACTITIONER

## 2024-07-03 PROCEDURE — 63600175 PHARM REV CODE 636 W HCPCS: Performed by: NURSE PRACTITIONER

## 2024-07-03 PROCEDURE — 80156 ASSAY CARBAMAZEPINE TOTAL: CPT | Performed by: NURSE PRACTITIONER

## 2024-07-03 PROCEDURE — 25000003 PHARM REV CODE 250: Performed by: INTERNAL MEDICINE

## 2024-07-03 PROCEDURE — 94761 N-INVAS EAR/PLS OXIMETRY MLT: CPT

## 2024-07-03 PROCEDURE — 99223 1ST HOSP IP/OBS HIGH 75: CPT | Mod: ,,, | Performed by: PSYCHIATRY & NEUROLOGY

## 2024-07-03 PROCEDURE — 27000221 HC OXYGEN, UP TO 24 HOURS

## 2024-07-03 PROCEDURE — 85025 COMPLETE CBC W/AUTO DIFF WBC: CPT | Performed by: NURSE PRACTITIONER

## 2024-07-03 PROCEDURE — 63600175 PHARM REV CODE 636 W HCPCS

## 2024-07-03 PROCEDURE — A4216 STERILE WATER/SALINE, 10 ML: HCPCS | Performed by: STUDENT IN AN ORGANIZED HEALTH CARE EDUCATION/TRAINING PROGRAM

## 2024-07-03 PROCEDURE — 99233 SBSQ HOSP IP/OBS HIGH 50: CPT | Mod: ,,,

## 2024-07-03 PROCEDURE — 84100 ASSAY OF PHOSPHORUS: CPT | Performed by: NURSE PRACTITIONER

## 2024-07-03 PROCEDURE — 27000190 HC CPAP FULL FACE MASK W/VALVE

## 2024-07-03 PROCEDURE — 94660 CPAP INITIATION&MGMT: CPT

## 2024-07-03 PROCEDURE — 83735 ASSAY OF MAGNESIUM: CPT | Performed by: PHYSICIAN ASSISTANT

## 2024-07-03 PROCEDURE — 80053 COMPREHEN METABOLIC PANEL: CPT | Performed by: NURSE PRACTITIONER

## 2024-07-03 PROCEDURE — 63600175 PHARM REV CODE 636 W HCPCS: Performed by: STUDENT IN AN ORGANIZED HEALTH CARE EDUCATION/TRAINING PROGRAM

## 2024-07-03 PROCEDURE — 99900035 HC TECH TIME PER 15 MIN (STAT)

## 2024-07-03 PROCEDURE — 95718 EEG PHYS/QHP 2-12 HR W/VEEG: CPT | Mod: ,,, | Performed by: PSYCHIATRY & NEUROLOGY

## 2024-07-03 PROCEDURE — 25000003 PHARM REV CODE 250: Performed by: STUDENT IN AN ORGANIZED HEALTH CARE EDUCATION/TRAINING PROGRAM

## 2024-07-03 PROCEDURE — 25000003 PHARM REV CODE 250: Performed by: HOSPITALIST

## 2024-07-03 PROCEDURE — 5A09357 ASSISTANCE WITH RESPIRATORY VENTILATION, LESS THAN 24 CONSECUTIVE HOURS, CONTINUOUS POSITIVE AIRWAY PRESSURE: ICD-10-PCS | Performed by: HOSPITALIST

## 2024-07-03 PROCEDURE — 99233 SBSQ HOSP IP/OBS HIGH 50: CPT | Mod: ,,, | Performed by: EMERGENCY MEDICINE

## 2024-07-03 PROCEDURE — 80202 ASSAY OF VANCOMYCIN: CPT | Performed by: HOSPITALIST

## 2024-07-03 RX ORDER — AMOXICILLIN 250 MG
2 CAPSULE ORAL 2 TIMES DAILY
Status: DISCONTINUED | OUTPATIENT
Start: 2024-07-03 | End: 2024-07-12 | Stop reason: HOSPADM

## 2024-07-03 RX ORDER — OXYCODONE AND ACETAMINOPHEN 10; 325 MG/1; MG/1
1 TABLET ORAL EVERY 6 HOURS PRN
Status: ON HOLD | COMMUNITY
End: 2024-07-12

## 2024-07-03 RX ORDER — FUROSEMIDE 10 MG/ML
40 INJECTION INTRAMUSCULAR; INTRAVENOUS ONCE
Status: COMPLETED | OUTPATIENT
Start: 2024-07-03 | End: 2024-07-03

## 2024-07-03 RX ORDER — HYDROMORPHONE HYDROCHLORIDE 1 MG/ML
2 INJECTION, SOLUTION INTRAMUSCULAR; INTRAVENOUS; SUBCUTANEOUS EVERY 4 HOURS PRN
Status: DISCONTINUED | OUTPATIENT
Start: 2024-07-03 | End: 2024-07-10

## 2024-07-03 RX ORDER — DIPHENHYDRAMINE HCL 25 MG
25 CAPSULE ORAL EVERY 6 HOURS PRN
Status: DISCONTINUED | OUTPATIENT
Start: 2024-07-03 | End: 2024-07-12 | Stop reason: HOSPADM

## 2024-07-03 RX ORDER — POLYETHYLENE GLYCOL 3350 17 G/17G
17 POWDER, FOR SOLUTION ORAL 2 TIMES DAILY
Status: DISCONTINUED | OUTPATIENT
Start: 2024-07-03 | End: 2024-07-12 | Stop reason: HOSPADM

## 2024-07-03 RX ORDER — PREGABALIN 200 MG/1
200 CAPSULE ORAL 3 TIMES DAILY
Status: ON HOLD | COMMUNITY
End: 2024-07-12 | Stop reason: HOSPADM

## 2024-07-03 RX ORDER — MUPIROCIN 20 MG/G
OINTMENT TOPICAL 2 TIMES DAILY
Status: DISPENSED | OUTPATIENT
Start: 2024-07-03 | End: 2024-07-08

## 2024-07-03 RX ORDER — MORPHINE SULFATE 15 MG/1
15 TABLET, FILM COATED, EXTENDED RELEASE ORAL 2 TIMES DAILY
Status: ON HOLD | COMMUNITY
End: 2024-07-12

## 2024-07-03 RX ORDER — PHENYLEPHRINE HCL IN 0.9% NACL 1 MG/10 ML
SYRINGE (ML) INTRAVENOUS
Status: DISCONTINUED
Start: 2024-07-03 | End: 2024-07-04 | Stop reason: WASHOUT

## 2024-07-03 RX ORDER — BISACODYL 10 MG/1
10 SUPPOSITORY RECTAL DAILY PRN
Status: DISCONTINUED | OUTPATIENT
Start: 2024-07-03 | End: 2024-07-12 | Stop reason: HOSPADM

## 2024-07-03 RX ORDER — HYDROMORPHONE HYDROCHLORIDE 1 MG/ML
1 INJECTION, SOLUTION INTRAMUSCULAR; INTRAVENOUS; SUBCUTANEOUS ONCE
Status: COMPLETED | OUTPATIENT
Start: 2024-07-03 | End: 2024-07-03

## 2024-07-03 RX ORDER — DIPHENHYDRAMINE HCL 25 MG
25 CAPSULE ORAL ONCE
Status: COMPLETED | OUTPATIENT
Start: 2024-07-04 | End: 2024-07-03

## 2024-07-03 RX ORDER — CARBAMAZEPINE 200 MG/1
200 TABLET ORAL 4 TIMES DAILY
Status: ON HOLD | COMMUNITY
End: 2024-07-12 | Stop reason: HOSPADM

## 2024-07-03 RX ORDER — HYDROMORPHONE HYDROCHLORIDE 1 MG/ML
1 INJECTION, SOLUTION INTRAMUSCULAR; INTRAVENOUS; SUBCUTANEOUS EVERY 4 HOURS PRN
Status: DISCONTINUED | OUTPATIENT
Start: 2024-07-03 | End: 2024-07-03

## 2024-07-03 RX ADMIN — HYDROMORPHONE HYDROCHLORIDE 1 MG: 1 INJECTION, SOLUTION INTRAMUSCULAR; INTRAVENOUS; SUBCUTANEOUS at 03:07

## 2024-07-03 RX ADMIN — FUROSEMIDE 40 MG: 10 INJECTION, SOLUTION INTRAVENOUS at 04:07

## 2024-07-03 RX ADMIN — APIXABAN 5 MG: 5 TABLET, FILM COATED ORAL at 10:07

## 2024-07-03 RX ADMIN — VANCOMYCIN HYDROCHLORIDE 1500 MG: 1.5 INJECTION, POWDER, LYOPHILIZED, FOR SOLUTION INTRAVENOUS at 09:07

## 2024-07-03 RX ADMIN — MUPIROCIN: 20 OINTMENT TOPICAL at 08:07

## 2024-07-03 RX ADMIN — SENNOSIDES AND DOCUSATE SODIUM 2 TABLET: 50; 8.6 TABLET ORAL at 08:07

## 2024-07-03 RX ADMIN — CARVEDILOL 25 MG: 25 TABLET, FILM COATED ORAL at 10:07

## 2024-07-03 RX ADMIN — Medication 10 ML: at 12:07

## 2024-07-03 RX ADMIN — CARVEDILOL 25 MG: 25 TABLET, FILM COATED ORAL at 08:07

## 2024-07-03 RX ADMIN — FLUOXETINE HYDROCHLORIDE 40 MG: 20 CAPSULE ORAL at 10:07

## 2024-07-03 RX ADMIN — DIPHENHYDRAMINE HYDROCHLORIDE 25 MG: 25 CAPSULE ORAL at 02:07

## 2024-07-03 RX ADMIN — DIPHENHYDRAMINE HYDROCHLORIDE 25 MG: 25 CAPSULE ORAL at 11:07

## 2024-07-03 RX ADMIN — BISACODYL 10 MG: 10 SUPPOSITORY RECTAL at 08:07

## 2024-07-03 RX ADMIN — Medication 10 ML: at 05:07

## 2024-07-03 RX ADMIN — HYDROMORPHONE HYDROCHLORIDE 2 MG: 1 INJECTION, SOLUTION INTRAMUSCULAR; INTRAVENOUS; SUBCUTANEOUS at 09:07

## 2024-07-03 RX ADMIN — MUPIROCIN: 20 OINTMENT TOPICAL at 12:07

## 2024-07-03 RX ADMIN — LACTULOSE 30 G: 20 SOLUTION ORAL at 08:07

## 2024-07-03 RX ADMIN — HYDROMORPHONE HYDROCHLORIDE 1 MG: 1 INJECTION, SOLUTION INTRAMUSCULAR; INTRAVENOUS; SUBCUTANEOUS at 09:07

## 2024-07-03 RX ADMIN — POLYETHYLENE GLYCOL 3350 17 G: 17 POWDER, FOR SOLUTION ORAL at 08:07

## 2024-07-03 RX ADMIN — LACTULOSE 30 G: 20 SOLUTION ORAL at 02:07

## 2024-07-03 RX ADMIN — DIPHENHYDRAMINE HYDROCHLORIDE 25 MG: 25 CAPSULE ORAL at 10:07

## 2024-07-03 RX ADMIN — HYDROMORPHONE HYDROCHLORIDE 1 MG: 1 INJECTION, SOLUTION INTRAMUSCULAR; INTRAVENOUS; SUBCUTANEOUS at 01:07

## 2024-07-03 RX ADMIN — SENNOSIDES AND DOCUSATE SODIUM 2 TABLET: 50; 8.6 TABLET ORAL at 12:07

## 2024-07-03 RX ADMIN — AMLODIPINE BESYLATE 10 MG: 10 TABLET ORAL at 10:07

## 2024-07-03 RX ADMIN — POTASSIUM CHLORIDE 20 MEQ: 1500 TABLET, EXTENDED RELEASE ORAL at 10:07

## 2024-07-03 RX ADMIN — HYDROMORPHONE HYDROCHLORIDE 1 MG: 1 INJECTION, SOLUTION INTRAMUSCULAR; INTRAVENOUS; SUBCUTANEOUS at 02:07

## 2024-07-03 RX ADMIN — HYDROMORPHONE HYDROCHLORIDE 2 MG: 1 INJECTION, SOLUTION INTRAMUSCULAR; INTRAVENOUS; SUBCUTANEOUS at 05:07

## 2024-07-03 RX ADMIN — APIXABAN 5 MG: 5 TABLET, FILM COATED ORAL at 08:07

## 2024-07-03 NOTE — HOSPITAL COURSE
Admitted to MICU for encephalopathy.  Tegretol level found to be 24.8.  Medical toxicology consulted. Patient mental status has improved and tegretol improved to 13.1.  Stable for stepdown.

## 2024-07-03 NOTE — RESIDENT HANDOFF
ICU Transfer of Care Note  Critical Care Medicine    Admit Date: 6/26/2024  LOS: 6    CC: Encephalopathy    Code Status: Full Code         HPI and Hospital Course:     HPI:  Ms. Nazanin Malone is a 44 y.o. woman with PMHx of sickle cell disease, acute chest syndrome, HTN, depression, sickle cell beta thalassemia.  Has received exchange transfusion in the past with right chest wall port.  History of PE but has not been taking Eliquis for the past several months related to insurance issues.  She recently moved here from [place].  She presented to ED for evaluation of chest pain in the setting of sickle cell disease.  Admitted to hospital medicine.     On 7/2 patient had altered mental status with concern for seizures.  EEG completed with no seizure activity and code stroke activated.  CTH with no acute intracranial pathology.  Critical Care Medicine consulted for encephalopathy and patient transferred to MICU.      Hospital/ICU Course:  Admitted to MICU for encephalopathy.  Tegretol level found to be 24.8.  Medical toxicology consulted. Patient mental status has improved and tegretol improved to 13.1.  Stable for stepdown.        To Follow Up:     --AM tegretol level  --Resume tegretol when appropriate  --Pain control      Discharge Plan:     TBD.     Call [phone] with questions.     Nneka NOEL-LUIS  Pulmonary Critical Care Medicine  07/03/2024  1:37 PM

## 2024-07-03 NOTE — PROGRESS NOTES
EEG Hook up  AM Check Electrodes had to be fixed.No    Skin Integrity: Normal   No signs of skin breakdown seen during [facility]   07/03/2024 10:33 AM

## 2024-07-03 NOTE — PROGRESS NOTES
Pharmacokinetic Assessment Follow Up: IV Vancomycin    Vancomycin serum concentration assessment(s):    Vancomycin random level resulted at 12.2 mcg/mL. Goal level is 15 to 20 mcg/mL.     Drug levels (last 3 results):  Recent Labs   Lab Result Units 07/01/24  1425 07/02/24  0533 07/03/24  0503   Vancomycin, Random ug/mL  --  18.4 12.2   Vancomycin-Trough ug/mL 25.8*  --   --      Vancomycin Regimen Plan:    Schedule vancomycin 1500 mg IV every 24 hours with next serum trough concentration measured on 7/5 0800.     Pharmacy will continue to follow and monitor vancomycin.    Please contact pharmacy at extension [phone] for questions regarding this assessment.    Thank you for the consult,   Zayra Massey, PharmD, BCCCP                 Patient brief summary:  Nazanin Malone is a 44 y.o. female initiated on antimicrobial therapy with IV vancomycin for treatment of bacteremia    Drug Allergies:   Review of patient's allergies indicates:  No Known Allergies    Actual Body Weight:   86.2 kg     Renal Function:   Estimated Creatinine Clearance: 71.6 mL/min (based on SCr of 1 mg/dL).    Dialysis Method (if applicable):  N/A    CBC (last 72 hours):  Recent Labs   Lab Result Units 07/01/24  0914 07/02/24  0533 07/02/24  1546 07/03/24  0503   WBC K/uL 7.19 6.72 8.05 6.13   Hemoglobin g/dL 11.8* 9.9* 11.4* 9.8*   Hematocrit % 35.7* 30.5* 34.7* 28.5*   Platelets K/uL 270 278 328 280   Gran % % 62.6  --  52.2 48.5   Lymph % % 24.9  --  33.2 35.1   Mono % % 9.9  --  11.6 13.4   Eosinophil % % 1.9  --  2.2 2.3   Basophil % % 0.6  --  0.4 0.5   Differential Method  Automated  --  Automated Automated       Metabolic Panel (last 72 hours):  Recent Labs   Lab Result Units 07/01/24  0914 07/01/24  1425 07/02/24  0533 07/02/24  1546 07/02/24  1642 07/02/24  1844 07/03/24  0503   Sodium mmol/L 138  --  142 142 141  --  141   Potassium mmol/L 4.3  --  4.2 3.8 4.2  --  3.8   Chloride mmol/L 108  --  107 106 105  --  106   CO2 mmol/L 20*   --  26 26 24  --  28   Glucose mg/dL 90  --  94 89 96  --  98   BUN mg/dL 9  --  13 10 11  --  10   Creatinine mg/dL 1.0 1.1 1.5* 1.4 1.5*  --  1.0   Creatinine, Urine mg/dL  --   --   --   --   --  57.0  --    Albumin g/dL  --   --   --  3.4* 3.3*  --  2.9*   Total Bilirubin mg/dL  --   --   --  0.4 0.3  --  0.3   Alkaline Phosphatase U/L  --   --   --  82 80  --  71   AST U/L  --   --   --  30 37  --  31   ALT U/L  --   --   --  13 14  --  14   Magnesium mg/dL 1.9  --  1.9  --   --   --  2.1   Phosphorus mg/dL  --   --   --   --   --   --  3.6       Vancomycin Administrations:  vancomycin given in the last 96 hours                     vancomycin 1,500 mg in D5W 250 mL IVPB (Vial-Mate) (mg) 1,500 mg New Bag 07/03/24 0959    vancomycin 750 mg in D5W 250 mL IVPB (Vial-Mate) (mg) 750 mg New Bag 07/02/24 0554    vancomycin (VANCOCIN) 1,000 mg in D5W 250 mL IVPB (Vial-Mate) ()  Restarted 07/01/24 0413     1,000 mg New Bag  0356     1,000 mg New Bag 06/30/24 1600     1,000 mg New Bag  0354                    Microbiologic Results:  Microbiology Results (last 7 days)       Procedure Component Value Units Date/Time    Blood culture [[id number]] Collected: 06/27/24 1544    Order Status: Completed Specimen: Blood Updated: 07/02/24 1812     Blood Culture, Routine No growth after 5 days.    Narrative:      Collection has been rescheduled by DF2 at 06/27/2024 14:22 Reason:   Patient unavailable.  Eileen BLANCO ext [phone].  Collection has been rescheduled by DF2 at 06/27/2024 14:22 Reason:   Patient unavailable.  Eileen BLANCO ext [phone].    Blood culture [[id number]] Collected: 06/27/24 1532    Order Status: Completed Specimen: Blood Updated: 07/02/24 1812     Blood Culture, Routine No growth after 5 days.    Narrative:      Collection has been rescheduled by DF2 at 06/27/2024 14:22 Reason:   Patient unavailable.  Eileen BLANCO ext [phone].  Collection has been rescheduled by DF2 at 06/27/2024 14:22 Reason:   Patient unavailable.  Eileen BLANCO ext [phone].     Respiratory Infection Panel (PCR), Nasopharyngeal [[id number]] Collected: 06/29/24 1030    Order Status: Completed Specimen: Nasopharyngeal Swab Updated: 06/30/24 0026     Respiratory Infection Panel Source NP Swab     Adenovirus Not Detected     Coronavirus 229E, Common Cold Virus Not Detected     Coronavirus HKU1, Common Cold Virus Not Detected     Coronavirus NL63, Common Cold Virus Not Detected     Coronavirus OC43, Common Cold Virus Not Detected     Comment: The Coronavirus strains detected in this test cause the common cold.  These strains are not the COVID-19 (novel Coronavirus)strain   associated with the respiratory disease outbreak.          SARS-CoV2 (COVID-19) Qualitative PCR Not Detected     Human Metapneumovirus Not Detected     Human Rhinovirus/Enterovirus Not Detected     Influenza A (subtypes H1, H1-2009,H3) Not Detected     Influenza B Not Detected     Parainfluenza Virus 1 Not Detected     Parainfluenza Virus 2 Not Detected     Parainfluenza Virus 3 Not Detected     Parainfluenza Virus 4 Not Detected     Respiratory Syncytial Virus Not Detected     Bordetella Parapertussis ([id number]) Not Detected     Bordetella pertussis (ptxP) Not Detected     Chlamydia pneumoniae Not Detected     Mycoplasma pneumoniae Not Detected    Narrative:      Assay not valid for lower respiratory specimens, alternate  testing required.    Blood culture #2 **CANNOT BE ORDERED STAT** [[id number]]  (Abnormal)  (Susceptibility) Collected: 06/26/24 1346    Order Status: Completed Specimen: Blood from Peripheral, Antecubital, Left Updated: 06/29/24 1041     Blood Culture, Routine Gram stain kimberly bottle: Gram positive cocci in clusters resembling Staph      Results called to and read back by: Eileen Soler LPN 06/27/2024  09:34      Gram stain aer bottle: Gram positive cocci in clusters resembling Staph      Positive results previously called 06/27/2024  11:27      STAPHYLOCOCCUS EPIDERMIDIS    Blood culture #1 **CANNOT BE ORDERED  STAT** [[id number]]  (Abnormal)  (Susceptibility) Collected: 06/26/24 1346    Order Status: Completed Specimen: Blood from Peripheral, Antecubital, Right Updated: 06/29/24 1040     Blood Culture, Routine Gram stain aer bottle: Gram positive cocci in clusters resembling Staph      Results called to and read back by: Eileen Soler LPN 06/27/2024  09:32      Gram stain kimberly bottle: Gram positive cocci in clusters resembling Staph      Positive results previously called 06/27/2024  11:26      STAPHYLOCOCCUS EPIDERMIDIS    Respiratory Infection Panel (PCR), Nasopharyngeal [[id number]] Collected: 06/29/24 0802    Order Status: Canceled Specimen: Nasopharyngeal Swab     MRSA/SA Rapid ID by PCR from Blood culture [[id number]] Collected: 06/26/24 1346    Order Status: Completed Updated: 06/27/24 1111     Staph aureus ID by PCR Negative     Methicillin Resistant ID by PCR Negative

## 2024-07-03 NOTE — CONSULTS
[facility]  Neurology  Consult Note    Patient Name: Nazanin Malone  MRN: [id number]  Admission Date: 6/26/2024  Hospital Length of Stay: 6 days  Code Status: Full Code   Attending Provider: Waldemar Linda MD   Consulting Provider: Brian Richardson MD  Primary Care Physician: Pee Montgomery MD  Principal Problem:Encephalopathy    Inpatient consult to Neurology  Consult performed by: Brian Richardson MD  Consult ordered by: Vijaya Acosta MD         Subjective:     Chief Complaint:  Seizures     HPI:   Ms. Nazanin Malone is a 44 year old female with a past history of trigeminal neuralgia, sickle cell beta thalassemia, PE, HTN, asthma, and depression that is currently admitted with a sickle cell pain crisis. The morning of 7/2, patient was noted to be altered and somnolent. A stroke code was called given her acute change in mentation, but imaging was negative. Neurology was consulted due to concern for possible seizure activity due to abnormal arm/hand movements and mental status. Evening of 7/2, patient was stepped up to MICU due to concern for airway protection and altered mental status. During workup, carbamazepine level was found to be markedly elevated. Medical toxicology was consulted and carbamazepine was discontinued. The next morning, during my interview, patient was alert and oriented x4. Serum carbamazepine concentration also began to downtrend.     Patient reports that she has not seen a Neurologist for management of her TN in a long time. She also reports that she has been taking carbamazepine 200mg TID, instead of the 800mg BID that she's prescribed. However, patient was receiving her prescribed dose while inpatient as opposed to how she was taking it at home.     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     Current Outpatient Medications on File Prior to Encounter   Medication Sig    carBAMazepine (TEGRETOL) 200 mg tablet Take 200 mg by mouth 4 (four) times daily.    morphine (MS CONTIN) 15 MG 12 hr tablet Take 15 mg by mouth 2 (two) times daily.    oxyCODONE-acetaminophen (PERCOCET)  mg per tablet Take 1 tablet by mouth every 6 (six) hours as needed for Pain.    pregabalin (LYRICA) 200 MG Cap Take 200 mg by mouth 3 (three) times daily.    acetaminophen (TYLENOL) 500 MG tablet Take 1,000 mg by mouth daily as needed for Pain.    albuterol (VENTOLIN HFA) 90 mcg/actuation inhaler Inhale 2 puffs into the lungs every 6 (six) hours as needed for Wheezing or Shortness of Breath. Rescue    amLODIPine (NORVASC) 10 MG tablet TAKE 1 TABLET BY MOUTH EVERY DAY    ascorbic acid (VITAMIN C ORAL) Take 1 capsule by mouth once daily.    carvediloL (COREG) 25 MG tablet TAKE 1 TABLET BY MOUTH TWICE A DAY    ELIQUIS 5 mg Tab TAKE 1 TABLET BY MOUTH TWICE A DAY    FLUoxetine 40 MG capsule TAKE 1 CAPSULE BY MOUTH EVERY DAY    fluticasone propionate (FLONASE) 50 mcg/actuation nasal spray INSTILL 1 SPRAY INTO EACH NOSTRIL EVERY DAY    folic acid (FOLVITE) 1 MG tablet Take 1 tablet (1 mg total) by mouth once daily.    glutamine, sickle cell, (ENDARI) 5 gram PwPk Take 3 packets (15 g) by mouth 2 (two) times daily.    hydroCHLOROthiazide (HYDRODIURIL) 25 MG tablet TAKE 1 TABLET BY MOUTH EVERY DAY    hydroxyurea (HYDREA) 500 mg Cap  TAKE 2 CAPSULES (1,000 MG TOTAL) BY MOUTH 2 (TWO) TIMES DAILY.    ondansetron (ZOFRAN) 8 MG tablet TAKE 1 TABLET BY MOUTH EVERY 12 HOURS AS NEEDED FOR NAUSEA    potassium chloride SA (K-DUR,KLOR-CON) 20 MEQ tablet Take 1 tablet (20 mEq total) by mouth once daily.    promethazine (PHENERGAN) 25 MG tablet Take 1 tablet (25 mg total) by mouth every 6 (six) hours as needed for Nausea.    senna-docusate 8.6-50 mg (PERICOLACE) 8.6-50 mg per tablet Take 1 tablet by mouth 2 (two) times daily as needed for Constipation. (Patient taking differently: Take 1 tablet by mouth daily as needed for Constipation. )    [DISCONTINUED] albuterol-ipratropium (DUO-NEB) 2.5 mg-0.5 mg/3 mL nebulizer solution Take 3 mLs by nebulization every 6 (six) hours as needed for Wheezing or Shortness of Breath. Rescue    [DISCONTINUED] carBAMazepine (TEGRETOL) 200 mg tablet TAKE 4 TABLETS BY MOUTH TWICE DAILY    [DISCONTINUED] gabapentin (NEURONTIN) 400 MG capsule TAKE 2 CAPSULES BY MOUTH 3 TIMES A DAY     Family History       Problem Relation (Age of Onset)    Diabetes Mother    Heart disease Mother, Father          Tobacco Use    Smoking status: Never    Smokeless tobacco: Never   Substance and Sexual Activity    Alcohol use: No    Drug use: Yes     Types: Marijuana     Comment: periodically     Sexual activity: Not Currently     Birth control/protection: See Surgical Hx     Review of Systems   Constitutional:  Positive for fatigue. Negative for chills and fever.   HENT:  Negative for ear pain, facial swelling, sore throat and trouble swallowing.    Eyes:  Negative for pain.   Musculoskeletal:  Positive for back pain.   Neurological:  Negative for dizziness, facial asymmetry, speech difficulty, weakness, numbness and headaches.     Objective:     Vital Signs (Most Recent):  Temp: 99.2 °F (37.3 °C) (07/03/24 1100)  Pulse: 92 (07/03/24 1300)  Resp: 16 (07/03/24 1437)  BP: (!) 153/77 (07/03/24 1300)  SpO2: 100 % (07/03/24 1300) Vital Signs (24h  Range):  Temp:  [98.3 °F (36.8 °C)-99.2 °F (37.3 °C)] 99.2 °F (37.3 °C)  Pulse:  [74-92] 92  Resp:  [10-37] 16  SpO2:  [95 %-100 %] 100 %  BP: (123-164)/() 153/77     Weight: 86.2 kg (190 lb 0.6 oz)  Body mass index is 34.76 kg/m².     Physical Exam  Vitals and nursing note reviewed.   Constitutional:       General: She is not in acute distress.     Appearance: Normal appearance. She is not ill-appearing.   HENT:      Head: Normocephalic and atraumatic.      Mouth/Throat:      Mouth: Mucous membranes are moist.      Pharynx: Oropharynx is clear.   Cardiovascular:      Rate and Rhythm: Normal rate and regular rhythm.      Pulses: Normal pulses.   Pulmonary:      Effort: Pulmonary effort is normal. No respiratory distress.   Abdominal:      General: Abdomen is flat. There is no distension.      Palpations: Abdomen is soft.   Musculoskeletal:      Cervical back: Normal range of motion.      Right lower leg: No edema.      Left lower leg: No edema.   Skin:     General: Skin is warm and dry.   Neurological:      Mental Status: She is alert and oriented to person, place, and time.      Cranial Nerves: Cranial nerves 2-12 are intact.      Motor: Motor strength is normal.     Deep Tendon Reflexes:      Reflex Scores:       Bicep reflexes are 2+ on the right side and 2+ on the left side.       Patellar reflexes are 2+ on the right side and 2+ on the left side.       Achilles reflexes are 2+ on the right side and 2+ on the left side.         NEUROLOGICAL EXAMINATION:     MENTAL STATUS   Oriented to person, place, and time.     CRANIAL NERVES   Cranial nerves II through XII intact.     MOTOR EXAM   Overall muscle tone: normal    Strength   Strength 5/5 throughout.     REFLEXES     Reflexes   Right biceps: 2+  Left biceps: 2+  Right patellar: 2+  Left patellar: 2+  Right achilles: 2+  Left achilles: 2+  Right plantar: normal  Left plantar: normal    SENSORY EXAM   Light touch normal.       Significant Labs: All pertinent  lab results from the past 24 hours have been reviewed.    Significant Imaging: I have reviewed all pertinent imaging results/findings within the past 24 hours.  Assessment and Plan:     * Encephalopathy  Ms. Malone is a 44 year old female with a past history of sickle cell beta thalassemia that developed acute encephalopathy with subsequent discovery of supratherapeutic carbamazapine level. Neurology was consulted for question of seizures. EEG was remarkable only for moderate-severe encephalopathy without epileptiform discharges. AMS was likely secondary to carbamazepine toxicity, and improvement has been demonstrated after downtrend in serum concentration. On neurological exam, patient seems to be back at baseline. Patient also denies TN pain after discontinuation of carbamazepine. Oftentimes, patients can achieve full remission of TN pain without need for life-long medical therapy.     Recommendations  - Would consider discontinuation of carbamazepine given patient's supratherapeutic event  - Recommend outpatient routine follow up with neurology for TN management    Neurology will sign off. Thank you for your consult. Please reach out with questions or concerns.        VTE Risk Mitigation (From admission, onward)           Ordered     apixaban tablet 5 mg  2 times daily         06/26/24 1846     IP VTE HIGH RISK PATIENT  Once         06/26/24 1846     Place sequential compression device  Until discontinued         06/26/24 1846                    Thank you for your consult. I will sign off. Please contact us if you have any additional questions.    Brian Richardson MD  Neurology  [facility] - Cardiac Medical ICU

## 2024-07-03 NOTE — NURSING
MICU DAILY GOALS     Family/Goals of care/Code Status   Code Status: Full Code    24H Vital Sign Range  Temp:  [98.3 °F (36.8 °C)-99.2 °F (37.3 °C)]   Pulse:  [74-92]   Resp:  [10-40]   BP: (123-164)/()   SpO2:  [95 %-100 %]      Shift Events (include procedures and significant events)   No acute events throughout shift. Pt c/o L arm swollen and pain - team notifed - lasix given. Step down order in place    AWAKE RASS: Goal - RASS Goal: 0-->alert and calm  Actual - RASS ([place] Agitation-Sedation Scale): alert and calm    Restraint necessity: Not necessary   BREATHE SBT: Not intubated    Coordinate A & B, analgesics/sedatives Pain: managed   SAT: Not intubated   Delirium CAM-ICU: Overall CAM-ICU: Negative   Early(intubated/ Progressive (non-intubated) Mobility MOVE Screen (INTUBATED ONLY): Not intubated    Activity: Activity Management: Arm raise - L1   Feeding/Nutrition Diet order: Diet/Nutrition Received: regular,     Thrombus DVT prophylaxis: VTE Required Core Measure: Pharmacological prophylaxis initiated/maintained   HOB Elevation Head of Bed (HOB) Positioning: HOB at 30-45 degrees   Ulcer Prophylaxis GI: yes   Glucose control managed     Skin Skin assessed during: Daily Assessment    Sacrum intact/not altered? Yes  Heels intact/not altered? Yes  Surgical wound? No    CHECK ONE!   (no altered skin or altered skin) and sub boxes:  [x] No Altered Skin Integrity Present    [x]Prevention Measures Documented    [] Altered Skin Integrity Present or Discovered   [] LDA present in EPIC, daily doc completed              [] LDA added if not in EPIC (describe wound).                    When describing wound, do not stage, use descriptive words only.    [] Wound Image Taken (required on admit,                   transfer/discharge and every Tuesday)    Wound Care Consulted? No    4 EYES:  Attending Nurse (1st set of eyes):     Second RN/Staff Member (2nd set of eyes):    Bowel Function constipation    Indwelling  Catheter Necessity      Urethral Catheter 07/02/24 1828-Reason for Continuing Urinary Catheterization: Urinary retention, Critically ill in ICU and requiring hourly monitoring of intake/output       yes   De-escalation Antibiotics No        VS and assessment per flow sheet, patient progressing towards goals as tolerated, plan of care reviewed with  pt , all concerns addressed, will continue to monitor.

## 2024-07-03 NOTE — PROCEDURES
EEG REPORT      Nazanin Malone  [id number]  1980    DATE OF SERVICE: 7/2/2024     -1,1    METHODOLOGY      Extended electroencephalographic recording is made while the patient is ambulatory and continuing normal daily activities.  Electrodes are placed according to the International 10-20 placement system and included T1 and T2 electrode placement.  Twenty four (24) channels of digital signal (sampling rate of 512/sec) was simultaneously recorded from the scalp including EKG and eye monitors.  Recording band pass was 0.1 to 100 hz and all data was stored digitally on the recorder.  The patient is instructed to press an event button when clinical symptoms occur and write the symptoms into a diary. Activation procedures which include photic stimulation, hyperventilation and instructing patients to perform simple task are done in selected patients.        The EEG is displayed on a monitor screen and can be reformatted into different montages for evaluation.  The entire recoding is submitted for computer assisted analysis to detect spike and electrographic seizure activity.  The entire recording is visually reviewed and the times identified by computer analysis as being spikes or seizures are reviewed again.  Compresses spectral analysis (CSA) is also performed on the activity recorded from each individual channel.  This is displayed as a power display of frequencies from 0 to 30 Hz over time.   The CSA analysis is done and displayed continuously.  This is reviewed for asymmetries in power between homologous areas of the scalp and for presence of changes in power which canbe seen when seizures occur.  Sections of suspected abnormalities on the CSA is then compared with the original EEG recording.  .     [employer] software was also utilized in the review of this study.  This software suite analyzes the EEG recording in multiple domains.  Coherence and rhythmicity is computed to identify EEG sections which  may contain organized seizures.  Each channel undergoes analysis to detect presence of spike and sharp waves which have special and morphological characteristic of epileptic activity.  The routine EEG recording is converted from spacial into frequency domain.  This is then displayed comparing homologous areas to identify areas of significant asymmetry.  Algorithm to identify non-cortically generated artifact is used to separate eye movement, EMG and other artifact from the EEG     Recording Times  Start on 7/2/2024  Stop on 7/3/2024    A total of 12:59:00 and 2:03:40 hours of EEG was recorded.      EEG FINDINGS:  Background activity:   The background rhythm was characterized by poorly organized delta and theta activity with a poorly sustained 6-7Hz posterior dominant alpha rhythm at 30-70 microvolts.   Symmetry and continuity: the background was continuous and symmetric     Sleep:   Normal sleep transients including K complexes but no clear sleep architecture.    Activation procedures:   NA    Abnormal activity:   No epileptiform discharges, periodic discharges, lateralized rhythmic delta activity or electrographic seizures were seen.    IMPRESSION:   Abnormal one day video EEG due to a mild to moderate generalized cerebral dysfunction with no electrographic seizures or indications of seizure tendency.      Feliciano Montemayor MD  Neurology-Epilepsy.  [facility].

## 2024-07-03 NOTE — ASSESSMENT & PLAN NOTE
Patient with acute kidney injury likely.  Which is currently worsening. Labs reviewed- Renal function/electrolytes with Estimated Creatinine Clearance: 71.6 mL/min (based on SCr of 1 mg/dL). according to latest data. Now improving.      --Renally dose medications, avoid nephrotoxins  --Strict I/O  --Trend BMP daily

## 2024-07-03 NOTE — HPI
Ms. Nazanin Malone is a 44 year old female with a past history of trigeminal neuralgia, sickle cell beta thalassemia, PE, HTN, asthma, and depression that is currently admitted with a sickle cell pain crisis. The morning of 7/2, patient was noted to be altered and somnolent. A stroke code was called given her acute change in mentation, but imaging was negative. Neurology was consulted due to concern for possible seizure activity due to abnormal arm/hand movements and mental status. Evening of 7/2, patient was stepped up to MICU due to concern for airway protection and altered mental status. During workup, carbamazepine level was found to be markedly elevated. Medical toxicology was consulted and carbamazepine was discontinued. The next morning, during my interview, patient was alert and oriented x4. Serum carbamazepine concentration also began to downtrend.     Patient reports that she has not seen a Neurologist for management of her TN in a long time. She also reports that she has been taking carbamazepine 200mg TID, instead of the 800mg BID that she's prescribed. However, patient was receiving her prescribed dose while inpatient as opposed to how she was taking it at home.

## 2024-07-03 NOTE — CONSULTS
Medical Toxicology  PLAN OF CARE NOTE - FULL CONSULT NOTE TO FOLLOW    Patient Name: Nazanin Malone  MRN: [id number]  Admission Date: 6/26/2024  Hospital Length of Stay: 5 days  Attending Physician: Waldemar Linda MD   Primary Care Provider: Pee Montgomery MD     RECOMMENDATIONS:   Repeat Carbamazepine level now, then q4-6 hours until therapeutic (< 12 mcg/mL).    2.   Repeat EKG now. Carbamazepine has a similar toxicity profile to tricyclic antidepressants and thus may cause tachycardia, QRS prolongation, Qtc prolongation, and rarely bradycardia or AV blockade. QRS prolongation is treated with sodium bicarbonate boluses similar to tricyclic antidepressant toxicity. Qtc prolongation and TdP are treated similar to other causes with optimizing electrolytes and magnesium as needed if above the QT nomogram.    A. Please feel free to contact us at any time if the patient develops concerning interval changes or dysrhythmias   B. If the patient's EKG is normal, consider repeating in several hours until toxicity resolves, or as clinically indicated.     3.  Carbamazepine is amenable to removal with hemodialysis (hemodialysis is preferred over CRRT).    EXTRIP guidelines are typically used to help guide the decision for hemodialysis:      ECTR (extracorporeal treatment, e.g. HD) is recommended if ANY of the following conditions are present:    If multiple seizures refractory to treatment occur (1D)  If life-threatening dysrhythmias occur (1D)    ECTR is suggested if ANY of the following conditions are present:    If prolonged coma and/or respiratory depression requiring mechanical ventilation is present or expected (2D)  If significant toxicity persists, especially if carbamazepine concentrations rise or remain elevated, despite MDAC and support measures (2D)    The complete EXTRIP guidelines and associated publication can be found here (https://[email or web address]-workgroup.org/carbamazepine)    4. While multi-dose  activated charcoal is often recommended to interrupt enterohepatic recirculation of carbamazepine, the risk of aspiration in this patient likely outweighs the benefit, and would not recommend activated charcoal at this time.     5. Continue typical supportive cares. There are no other specific antidotes or enhanced elimination techniques available for carbamazepine toxicity.     Thank you for involving the Medical Toxicology Service in the care of this patient please feel free to contact us any time with any questions or change in clinical status.     _______________________________________________________________________________    Patient information was obtained from primary team and EMR .     REASON FOR CONSULT: Carbamazepine Toxicity.       Inpatient Consult to Medical Toxicology  Consult performed by: Jessica Brar MD  Consult ordered by: Nathalie José DNP        Subjective:     Principal Problem:Encephalopathy    Chief Complaint:   Chief Complaint   Patient presents with    Chest Pain     Chest pain and leg pain beginning 3 days ago        HPI: Patient is a 44 year old female with a past medical history remarkable for HTN, sickle cell beta thalassemia, PE, HTN, asthma, depression, trigeminal neuralgia who presented to the emergency department on 6/26 with complaints of chest pain for the prior 3 days. The patient had a relatively benign work up in the ED, no evidence of acute chest syndrome nor PE, however, the patient had ongoing pain and thus was admitted to hospital medicine. The patient required ongoing opioid therapy during her admission, however, this evening the patient became obtunded and a rapid response was called. Per notes, the patient was found obtunded, however arousable to voice and had no response to naloxone.    The patient was transferred to the ICU for closer observation and monitoring where a carbamazepine level was completed, and resulted 24.8 mcg/mL and thus medical toxicology  was consulted. There is no reported known acute ingestion. The medication is prescribed to the patient for trigeminal neuralgia.      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     Current Outpatient Medications on File Prior to Encounter   Medication Sig    amLODIPine (NORVASC) 10 MG tablet TAKE 1 TABLET BY MOUTH EVERY DAY    carvediloL (COREG) 25 MG tablet TAKE 1 TABLET BY MOUTH TWICE A DAY    acetaminophen (TYLENOL) 500 MG tablet Take 1,000 mg by mouth daily as needed for Pain.    albuterol (VENTOLIN HFA) 90 mcg/actuation inhaler Inhale 2 puffs into the lungs every 6 (six) hours as needed for Wheezing or Shortness of Breath. Rescue    albuterol-ipratropium (DUO-NEB) 2.5 mg-0.5 mg/3 mL nebulizer solution Take 3 mLs by nebulization every 6 (six) hours as needed for Wheezing or Shortness of Breath. Rescue    ascorbic acid (VITAMIN C ORAL) Take 1 capsule by mouth once daily.    carBAMazepine (TEGRETOL) 200 mg tablet TAKE 4 TABLETS BY MOUTH TWICE DAILY    ELIQUIS 5 mg Tab TAKE 1 TABLET BY MOUTH TWICE A DAY    FLUoxetine 40 MG capsule TAKE 1 CAPSULE BY MOUTH EVERY DAY    fluticasone propionate (FLONASE) 50 mcg/actuation nasal spray INSTILL 1 SPRAY INTO EACH NOSTRIL EVERY DAY    folic acid (FOLVITE) 1 MG tablet Take 1 tablet (1 mg total) by mouth  once daily.    gabapentin (NEURONTIN) 400 MG capsule TAKE 2 CAPSULES BY MOUTH 3 TIMES A DAY    glutamine, sickle cell, (ENDARI) 5 gram PwPk Take 3 packets (15 g) by mouth 2 (two) times daily.    hydroCHLOROthiazide (HYDRODIURIL) 25 MG tablet TAKE 1 TABLET BY MOUTH EVERY DAY    hydroxyurea (HYDREA) 500 mg Cap TAKE 2 CAPSULES (1,000 MG TOTAL) BY MOUTH 2 (TWO) TIMES DAILY.    ondansetron (ZOFRAN) 8 MG tablet TAKE 1 TABLET BY MOUTH EVERY 12 HOURS AS NEEDED FOR NAUSEA    potassium chloride SA (K-DUR,KLOR-CON) 20 MEQ tablet Take 1 tablet (20 mEq total) by mouth once daily.    promethazine (PHENERGAN) 25 MG tablet Take 1 tablet (25 mg total) by mouth every 6 (six) hours as needed for Nausea.    senna-docusate 8.6-50 mg (PERICOLACE) 8.6-50 mg per tablet Take 1 tablet by mouth 2 (two) times daily as needed for Constipation. (Patient taking differently: Take 1 tablet by mouth daily as needed for Constipation. )     Family History       Problem Relation (Age of Onset)    Diabetes Mother    Heart disease Mother, Father          Tobacco Use    Smoking status: Never    Smokeless tobacco: Never   Substance and Sexual Activity    Alcohol use: No    Drug use: Yes     Types: Marijuana     Comment: periodically     Sexual activity: Not Currently     Birth control/protection: See Surgical Hx       Objective:     Vital Signs (Most Recent):  Temp: 98.7 °F (37.1 °C) (07/02/24 1900)  Pulse: 79 (07/02/24 2000)  Resp: 12 (07/02/24 2000)  BP: 123/71 (07/02/24 2000)  SpO2: 97 % (07/02/24 2000) Vital Signs (24h Range):  Temp:  [97.7 °F (36.5 °C)-99.2 °F (37.3 °C)] 98.7 °F (37.1 °C)  Pulse:  [78-92] 79  Resp:  [9-21] 12  SpO2:  [92 %-100 %] 97 %  BP: ()/(60-93) 123/71     Weight: 86.2 kg (190 lb 0.6 oz)  Body mass index is 34.76 kg/m².    PHYSICAL EXAM:  General: somnolent, rouses to name. NAD. Well nourished. Well Developed  Head: Normocephalic and Atraumatic  HEENT: PERRLA. EOMI. OP Clear  Neck: Supple, Nontender in  midline.  Cardiovascular: RRR. No m/r/g. 2+ Distal Pulses  Pulm/Chest: Chest nontender. Lungs clear to auscultation. No respiratory distress.  Abdomen: Nontender. Nondistended. No rigidity, rebound, or guarding  MSK: extremities atraumatic x 4. Gait normal  Ext: no clubbing, cyanosis, or edema  Neuro: GCS 11. CN II-XII intact. Obtunded. +nsytagmus. No clonus  Skin: no bullae, petechiae, or purpura. Warm, dry, and intact.  Psych: unable to assess      Significant Labs: All pertinent labs within the past 24 hours have been reviewed.  CMP:   Recent Labs   Lab 07/02/24  0533 07/02/24  1546 07/02/24  1642    142 141   K 4.2 3.8 4.2    106 105   CO2 26 26 24   GLU 94 89 96   BUN 13 10 11   CREATININE 1.5* 1.4 1.5*   CALCIUM 8.5* 9.1 8.9   PROT  --  7.3 7.5   ALBUMIN  --  3.4* 3.3*   BILITOT  --  0.4 0.3   ALKPHOS  --  82 80   AST  --  30 37   ALT  --  13 14   ANIONGAP 9 10 12   Lactate 1.0  Ammonia WNL  FT4 WNL 6/26  EKG @ 2120 NSR @ 72 bpm, , , Qtc 442  EKG 6/26 with NSR @ 91 bpm,, QRS 94,  Qtc 430  Significant Imaging: CTA head and neck from 7/2 @ 1442 Without evidence of acute occlusion, stenosis, CVA, hemorrhage    Carbamazepine: 24.8 mcg/mL@1642 7/2/2024  UDS  +opiates      BRIEF PHARMACOLOGY REVIEW    Carbamazepine is an antiepileptic used for multiple indications including epilepsy, trigeminal neuralgia, and mood stabilization. It has multiple mechanisms of action, including limitation of sodium influx and thus decreased neuronal discharge which aides in preventing seizure. It is structurally similar to the tricyclic antidepressant imipramine which is likely the cause of its additional mechanisms including antimuscarinic properties, seizure, and cardiac conduction disturbances including QRS and QT prolongation.     Carbamazepine undergoes erratic and unpredictable absorption, undergoes enterohepatic recirculation, and may form bezoars in the stomach when taken in overdose, making the  pharmacokinetics difficult to predict. At therapeutic doses, time to peak serum concentration in immediate release preparations is expected at 1.5 hours for liquid formulations, and up to 5 hours for tablets OR anywhere from 3-26 hours for extended release preparations, depending upon the preparation.     Carbamazepine is metabolized by CY to an active epoxide metabolite, and is primarily excreted through the urine. Unlike tricyclic antidepressants, carbamazepine has a relatively small volume of distribution (1.4-3L/kg) and is amenable to removal with hemodialysis.     Acute intoxication can be seen in patients with serum concentrations > 10-12 mcg/mL, and may include ataxia, nystagmus, dyskinesia (alters cerebellar-vestibular brainstem function), and tachycardia. At higher concentrations, seizure, coma, respiratory arrest, and cardiac conduction disturbances including QRS prolongation, QT prolongation, AV amber blockade have been described. Serum concentrations do not necessarily correlate well with severity of symptoms.     Treatment of toxicity is primarily supportive. Multi dose activated charcoal may be considered given erratic absorption and enteroheaptic recirculation, however, the risk of aspiration in confused patients must be considered. Carbamazepine may be removed with hemodialysis. EXTRIP workgroup guidelines can help guide the decision to choose hemodialysis. The decision to use hemodialysis is based upon clinical presentation rather than serum concentration:       ECTR is recommended if ANY of the following conditions are present:    If multiple seizures refractory to treatment occur (1D)  If life-threatening dysrhythmias occur (1D)    ECTR is suggested if ANY of the following conditions are present:    If prolonged coma and/or respiratory depression requiring mechanical ventilation is present or expected (2D)    If significant toxicity persists, especially if carbamazepine concentrations rise or  remain elevated, despite MDAC and support measures (2D)     Cessation of ECTR    Clinical improvement is apparent (1D)  Carbamazepine concentration is below 10 mg/L (42 µmol/L) (2D)     (Guideline and associated publication may be found here: https://[email or web address]-workgroup.org/carbamazepine)        Jessica Brar MD  Medical Toxicology  [facility]

## 2024-07-03 NOTE — PLAN OF CARE
MICU DAILY GOALS     Family/Goals of care/Code Status   Code Status: Full Code    24H Vital Sign Range  Temp:  [98.3 °F (36.8 °C)-99.2 °F (37.3 °C)]   Pulse:  [74-92]   Resp:  [10-21]   BP: (123-162)/()   SpO2:  [92 %-100 %]      Shift Events (include procedures and significant events)   No acute events throughout shift    AWAKE RASS: Goal - RASS Goal: 0-->alert and calm  Actual - RASS ([place] Agitation-Sedation Scale): alert and calm    Restraint necessity: Not necessary   BREATHE SBT: Not intubated    Coordinate A & B, analgesics/sedatives Pain: managed   SAT: Not intubated   Delirium CAM-ICU: Overall CAM-ICU: Negative   Early(intubated/ Progressive (non-intubated) Mobility MOVE Screen (INTUBATED ONLY): Not intubated    Activity: Activity Management: Arm raise - L1   Feeding/Nutrition Diet order: Diet/Nutrition Received: NPO,     Thrombus DVT prophylaxis: VTE Required Core Measure: Pharmacological prophylaxis initiated/maintained   HOB Elevation Head of Bed (HOB) Positioning: HOB at 30 degrees   Ulcer Prophylaxis GI: no   Glucose control managed     Skin Skin assessed during: Q Shift Change    Sacrum intact/not altered? Yes  Heels intact/not altered? Yes  Surgical wound? No    CHECK ONE!   (no altered skin or altered skin) and sub boxes:  [x] No Altered Skin Integrity Present    [x]Prevention Measures Documented    [] Altered Skin Integrity Present or Discovered   [] LDA present in EPIC, daily doc completed              [] LDA added if not in EPIC (describe wound).                    When describing wound, do not stage, use descriptive words only.    [] Wound Image Taken (required on admit,                   transfer/discharge and every Tuesday)    Wound Care Consulted? No    4 EYES:  Attending Nurse (1st set of eyes): FRANCIS Amezquita    Second RN/Staff Member (2nd set of eyes): FRANCIS Abreu     Bowel Function constipation    Indwelling Catheter Necessity      Urethral Catheter 07/02/24 1828-Reason for  Continuing Urinary Catheterization: Urinary retention, Critically ill in ICU and requiring hourly monitoring of intake/output          De-escalation Antibiotics Yes        VS and assessment per flow sheet, patient progressing towards goals as tolerated, plan of care reviewed with  Nazanin Malone , all concerns addressed, will continue to monitor.

## 2024-07-03 NOTE — PROGRESS NOTES
Medical Toxicology  Progress Note    Patient Name: Nazanin Malone  MRN: [id number]  Patient Class: IP- Inpatient   Admission Date: 6/26/2024  Length of Stay: 6 days  Attending Physician: Waldemar Linda MD  Primary Care Provider: Pee Montgomery MD  Reason for Consultation:  Carbamazepine overdose    RECOMMENDATIONS  1. Transitione to  daily carbamazepine level  2. When level is therapeutic, restart carbamazepine at 200 mg t.i.d.    Medical Toxicology will sign off at this time. Thank you for involving us in the care of this patient, and please feel free to contact us at any time with further questions.         Subjective:     Principal Problem:Encephalopathy    Interval History:  Patient reports that she was taking 200 mg t.i.d. of carbamazepine prior to admission.  Her previously listed prescription that was continued as an inpatient was 800 mg b.i.d..  She reports subjective improvement in her altered mental status.    Review of Systems  All other systems reviewed and negative except as noted in HPI    Objective:     Vital Signs (Most Recent):  Temp: 98.3 °F (36.8 °C) (07/03/24 0700)  Pulse: 89 (07/03/24 1010)  Resp: (!) 22 (07/03/24 1010)  BP: 132/74 (07/03/24 0900)  SpO2: 96 % (07/03/24 1010) Vital Signs (24h Range):  Temp:  [98.3 °F (36.8 °C)-99.2 °F (37.3 °C)] 98.3 °F (36.8 °C)  Pulse:  [74-92] 89  Resp:  [10-37] 22  SpO2:  [92 %-100 %] 96 %  BP: (123-162)/() 132/74     Weight: 86.2 kg (190 lb 0.6 oz)  Body mass index is 34.76 kg/m².    Intake/Output Summary (Last 24 hours) at 7/3/2024 1132  Last data filed at 7/3/2024 0925  Gross per 24 hour   Intake 248.41 ml   Output 2365 ml   Net -2116.59 ml      Physical Exam    General: AO x 3, NAD. Well nourished. Well Developed  Head: Normocephalic and Atraumatic  HEENT: PERRLA. EOMI. OP Clear  Neck: Supple, Nontender in midline.  Cardiovascular: RRR. No m/r/g. 2+ Distal Pulses  Pulm/Chest: Chest nontender. Lungs clear to auscultation. No respiratory  distress.  Abdomen: Nontender. Nondistended. No rigidity, rebound, or guarding  MSK: extremities atraumatic x 4. Gait normal  Ext: no clubbing, cyanosis, or edema  Neuro: GCS 15. CN II-XII intact. Intact symmetric sensation, strength, DTR x 4 extremities. Fatigable horizontal nsytagmus. No ataxia/dysmetria. No clonus/rigidity  Skin: no bullae, petechiae, or purpura. Warm, dry, and intact.  Psych: normal mood and affect.    Overview/Hospital Course:  Patient was started on 800 mg of carbamazepine b.i.d. based on prior prescription.  However, patient was actually taking 200 mg t.i.d..  She developed progressive encephalopathy and depressed mental status during her stay.  Her carbamazepine level was elevated at 24 mcg per mL.  Her dosing was held.  Her level improved to 20 micrograms/milliliter overnight.  At and 15 micrograms/milliliter this am. She still has some nystagmus but no more depressed mental status.    Significant Labs: All pertinent labs within the past 24 hours have been reviewed.    Recent Labs   Lab Result Units 07/02/24  1642 07/03/24  0039 07/03/24  0503   Carbamazepine Lvl ug/mL 24.8* 20.5* 15.5*       Significant Imaging: I have reviewed all pertinent imaging results/findings within the past 24 hours.    Assessment/Plan:         Active Diagnoses:    Diagnosis Date Noted POA    PRINCIPAL PROBLEM:  Encephalopathy [G93.40] 07/02/2024 No    FLOR (acute kidney injury) [N17.9] 07/02/2024 No    At risk for ineffective clearance of airway [Z91.89] 07/02/2024 Not Applicable    Bacteremia [R78.81] 06/28/2024 Yes    History of pulmonary embolism [Z86.711] 06/08/2020 Yes    Intermittent asthma [J45.20] 04/26/2019 Yes    Sickle-cell disease with pain [D57.00] 04/16/2017 Yes    Hypertension [I10] 04/16/2017 Yes    Iron deficiency anemia [D50.9] 02/21/2017 Yes      Problems Resolved During this Admission:          Joce Ventura MD  Medical Toxicology

## 2024-07-03 NOTE — PROGRESS NOTES
[facility]  Critical Care Medicine  Progress Note    Patient Name: Nazanin Malone  MRN: [id number]  Admission Date: 6/26/2024  Hospital Length of Stay: 6 days  Code Status: Full Code  Attending Provider: Waldemar Linda MD  Primary Care Provider: Pee [facility] MD   Principal Problem: Encephalopathy    Subjective:     HPI:  Ms. Nazanin Malone is a 44 y.o. woman with PMHx of sickle cell disease, acute chest syndrome, HTN, depression, sickle cell beta thalassemia.  Has received exchange transfusion in the past with right chest wall port.  History of PE but has not been taking Eliquis for the past several months related to insurance issues.  She recently moved here from [place].  She presented to ED for evaluation of chest pain in the setting of sickle cell disease.  Admitted to hospital medicine.     On 7/2 patient had altered mental status with concern for seizures.  EEG completed with no seizure activity and code stroke activated.  CTH with no acute intracranial pathology.  Critical Care Medicine consulted for encephalopathy and patient transferred to MICU.      Hospital/ICU Course:  Admitted to MICU for encephalopathy.  Tegretol level found to be 24.8.  Medical toxicology consulted. Patient mental status has improved and tegretol improved to 13.1.  Stable for stepdown.      Interval History/Significant Events: Overnight tegretol level found to be elevated and medical toxicology consulted.  Mentation improved and tegretol level improving. Stable for stepdown.      Review of Systems   Respiratory:  Negative for shortness of breath.    Cardiovascular:  Positive for leg swelling. Negative for chest pain.   Gastrointestinal:  Negative for abdominal pain.   Psychiatric/Behavioral:  Negative for confusion.      Objective:     Vital Signs (Most Recent):  Temp: 99.2 °F (37.3 °C) (07/03/24 1100)  Pulse: 92 (07/03/24 1300)  Resp: (!) 31 (07/03/24 1300)  BP: 133/73 (07/03/24 1200)  SpO2: 100 %  (07/03/24 1300) Vital Signs (24h Range):  Temp:  [98.3 °F (36.8 °C)-99.2 °F (37.3 °C)] 99.2 °F (37.3 °C)  Pulse:  [74-92] 92  Resp:  [10-37] 31  SpO2:  [95 %-100 %] 100 %  BP: (123-164)/() 133/73   Weight: 86.2 kg (190 lb 0.6 oz)  Body mass index is 34.76 kg/m².      Intake/Output Summary (Last 24 hours) at 7/3/2024 1327  Last data filed at 7/3/2024 1246  Gross per 24 hour   Intake 497.2 ml   Output 2690 ml   Net -2192.8 ml          Physical Exam  Vitals and nursing note reviewed.   Constitutional:       General: She is not in acute distress.     Appearance: She is ill-appearing.   HENT:      Head: Normocephalic.      Mouth/Throat:      Mouth: Mucous membranes are moist.      Pharynx: Oropharynx is clear.   Eyes:      Pupils: Pupils are equal, round, and reactive to light.   Cardiovascular:      Rate and Rhythm: Normal rate.      Pulses: Normal pulses.   Pulmonary:      Effort: Pulmonary effort is normal. No respiratory distress.      Breath sounds: Normal breath sounds.   Abdominal:      General: Abdomen is flat.      Palpations: Abdomen is soft.   Musculoskeletal:      Cervical back: Normal range of motion.      Right lower leg: No edema.      Left lower leg: Edema present.      Comments: Left lower arm swelling   Skin:     General: Skin is warm and dry.      Capillary Refill: Capillary refill takes less than 2 seconds.   Neurological:      General: No focal deficit present.      Mental Status: She is alert. Mental status is at baseline.   Psychiatric:         Mood and Affect: Mood normal.            Vents:     Lines/Drains/Airways       Drain  Duration                  Urethral Catheter 07/02/24 1828 <1 day              Peripheral Intravenous Line  Duration                  Midline Catheter - Single Lumen 07/02/24 Right basilic vein (medial side of arm) 18g x 10cm 1 day         Peripheral IV - Single Lumen 07/01/24 1915 20 G 1 3/4 in Yes Anterior;Left Upper Arm 1 day                  Significant  Labs:    CBC/Anemia Profile:  Recent Labs   Lab 07/02/24  0533 07/02/24  1440 07/02/24  1546 07/03/24  0503   WBC 6.72  --  8.05 6.13   HGB 9.9*  --  11.4* 9.8*   HCT 30.5* 31* 34.7* 28.5*     --  328 280   MCV 78*  --  77* 74*   RDW 20.8*  --  21.3* 20.7*        Chemistries:  Recent Labs   Lab 07/02/24  0533 07/02/24  1546 07/02/24  1642 07/03/24  0503    142 141 141   K 4.2 3.8 4.2 3.8    106 105 106   CO2 26 26 24 28   BUN 13 10 11 10   CREATININE 1.5* 1.4 1.5* 1.0   CALCIUM 8.5* 9.1 8.9 8.8   ALBUMIN  --  3.4* 3.3* 2.9*   PROT  --  7.3 7.5 6.3   BILITOT  --  0.4 0.3 0.3   ALKPHOS  --  82 80 71   ALT  --  13 14 14   AST  --  30 37 31   MG 1.9  --   --  2.1   PHOS  --   --   --  3.6       All pertinent labs within the past 24 hours have been reviewed.    Significant Imaging:  I have reviewed all pertinent imaging results/findings within the past 24 hours.    ABG  Recent Labs   Lab 07/02/24  1639   PH 7.341*   PO2 37*   PCO2 56.7*   HCO3 30.6*   BE 5*     Assessment/Plan:     Neuro  * Encephalopathy  Rapid response called 7/2 for AMS. Initial concern for AMS 2/2 opioid administration after receiving 2mg Dilaudid but no improvement after repeated Narcan administration. Stroke code called; CTA negative. No focal deficits. She appeared to be post-ictal (no hx of seizures). Tegretol level found to be elevated and medical toxicology consulted.  Mentation now improved      --Will stop EEG  --Pain regimen resumed  --Daily Tegretol level   --Medical toxicology following  --Resume tegretol when level is therapeutic at 200mg TID    Pulmonary  Intermittent asthma  Does not appear to be in exacerbation.      --Duonebs PRN    Cardiac/Vascular  Hypertension  --Continue coreg, amlodipine     Renal/  FLOR (acute kidney injury)  Patient with acute kidney injury likely.  Which is currently worsening. Labs reviewed- Renal function/electrolytes with Estimated Creatinine Clearance: 71.6 mL/min (based on SCr of 1  mg/dL). according to latest data. Now improving.      --Renally dose medications, avoid nephrotoxins  --Strict I/O  --Trend BMP daily    ID  Bacteremia  Staph epi bacteremia on blood cx, blood cx from 7/1 with NGTD.      --Continue vanc x14 days per ID recs  --If bacteremia persists, port may be source    Hematology  History of pulmonary embolism  CTA on 6/26 with no large central/saddle PE, nondiagnostic beyond level of R and L pulmonary arteries.  History of PE on eliquis    --Continue eliquis    Oncology  Sickle-cell disease with pain  History of Acute Chest Syndrome and Pulmonary embolism.  Came in to hospital with chest pain.      --Will need to reestablish care with heme/onc  --Multimodal pain control, continue dilaudid    Iron deficiency anemia  --CBC daily    Other  Tegretol toxicity  See encephalopathy for plan.        I have spent 35 min with this patient, with over 50% of this time spent coordinating care and speaking with the family.    Plan discussed with Dr. Linda.         Nneka Diez NP  Critical Care Medicine  [facility] - Cardiac Medical ICU

## 2024-07-03 NOTE — SUBJECTIVE & OBJECTIVE
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     Current Outpatient Medications on File Prior to Encounter   Medication Sig    carBAMazepine (TEGRETOL) 200 mg tablet Take 200 mg by mouth 4 (four) times daily.    morphine (MS CONTIN) 15 MG 12 hr tablet Take 15 mg by mouth 2 (two) times daily.    oxyCODONE-acetaminophen (PERCOCET)  mg per tablet Take 1 tablet by mouth every 6 (six) hours as needed for Pain.    pregabalin (LYRICA) 200 MG Cap Take 200 mg by mouth 3 (three) times daily.    acetaminophen (TYLENOL) 500 MG tablet Take 1,000 mg by mouth daily as needed for Pain.    albuterol (VENTOLIN HFA) 90 mcg/actuation inhaler Inhale 2 puffs into the lungs every 6 (six) hours as needed for Wheezing or Shortness of Breath. Rescue    amLODIPine (NORVASC) 10 MG tablet TAKE 1 TABLET BY MOUTH EVERY DAY    ascorbic acid (VITAMIN C ORAL) Take 1 capsule by mouth once daily.    carvediloL (COREG) 25 MG tablet TAKE 1 TABLET BY MOUTH TWICE A DAY    ELIQUIS 5 mg Tab TAKE 1 TABLET BY MOUTH TWICE A DAY    FLUoxetine 40 MG capsule TAKE 1 CAPSULE BY MOUTH EVERY DAY    fluticasone propionate (FLONASE) 50 mcg/actuation nasal spray INSTILL 1 SPRAY INTO EACH NOSTRIL EVERY DAY    folic acid (FOLVITE) 1 MG tablet Take 1 tablet (1 mg total) by mouth once daily.     glutamine, sickle cell, (ENDARI) 5 gram PwPk Take 3 packets (15 g) by mouth 2 (two) times daily.    hydroCHLOROthiazide (HYDRODIURIL) 25 MG tablet TAKE 1 TABLET BY MOUTH EVERY DAY    hydroxyurea (HYDREA) 500 mg Cap TAKE 2 CAPSULES (1,000 MG TOTAL) BY MOUTH 2 (TWO) TIMES DAILY.    ondansetron (ZOFRAN) 8 MG tablet TAKE 1 TABLET BY MOUTH EVERY 12 HOURS AS NEEDED FOR NAUSEA    potassium chloride SA (K-DUR,KLOR-CON) 20 MEQ tablet Take 1 tablet (20 mEq total) by mouth once daily.    promethazine (PHENERGAN) 25 MG tablet Take 1 tablet (25 mg total) by mouth every 6 (six) hours as needed for Nausea.    senna-docusate 8.6-50 mg (PERICOLACE) 8.6-50 mg per tablet Take 1 tablet by mouth 2 (two) times daily as needed for Constipation. (Patient taking differently: Take 1 tablet by mouth daily as needed for Constipation. )    [DISCONTINUED] albuterol-ipratropium (DUO-NEB) 2.5 mg-0.5 mg/3 mL nebulizer solution Take 3 mLs by nebulization every 6 (six) hours as needed for Wheezing or Shortness of Breath. Rescue    [DISCONTINUED] carBAMazepine (TEGRETOL) 200 mg tablet TAKE 4 TABLETS BY MOUTH TWICE DAILY    [DISCONTINUED] gabapentin (NEURONTIN) 400 MG capsule TAKE 2 CAPSULES BY MOUTH 3 TIMES A DAY     Family History       Problem Relation (Age of Onset)    Diabetes Mother    Heart disease Mother, Father          Tobacco Use    Smoking status: Never    Smokeless tobacco: Never   Substance and Sexual Activity    Alcohol use: No    Drug use: Yes     Types: Marijuana     Comment: periodically     Sexual activity: Not Currently     Birth control/protection: See Surgical Hx     Review of Systems   Constitutional:  Positive for fatigue. Negative for chills and fever.   HENT:  Negative for ear pain, facial swelling, sore throat and trouble swallowing.    Eyes:  Negative for pain.   Musculoskeletal:  Positive for back pain.   Neurological:  Negative for dizziness, facial asymmetry, speech difficulty, weakness, numbness and headaches.      Objective:     Vital Signs (Most Recent):  Temp: 99.2 °F (37.3 °C) (07/03/24 1100)  Pulse: 92 (07/03/24 1300)  Resp: 16 (07/03/24 1437)  BP: (!) 153/77 (07/03/24 1300)  SpO2: 100 % (07/03/24 1300) Vital Signs (24h Range):  Temp:  [98.3 °F (36.8 °C)-99.2 °F (37.3 °C)] 99.2 °F (37.3 °C)  Pulse:  [74-92] 92  Resp:  [10-37] 16  SpO2:  [95 %-100 %] 100 %  BP: (123-164)/() 153/77     Weight: 86.2 kg (190 lb 0.6 oz)  Body mass index is 34.76 kg/m².     Physical Exam  Vitals and nursing note reviewed.   Constitutional:       General: She is not in acute distress.     Appearance: Normal appearance. She is not ill-appearing.   HENT:      Head: Normocephalic and atraumatic.      Mouth/Throat:      Mouth: Mucous membranes are moist.      Pharynx: Oropharynx is clear.   Cardiovascular:      Rate and Rhythm: Normal rate and regular rhythm.      Pulses: Normal pulses.   Pulmonary:      Effort: Pulmonary effort is normal. No respiratory distress.   Abdominal:      General: Abdomen is flat. There is no distension.      Palpations: Abdomen is soft.   Musculoskeletal:      Cervical back: Normal range of motion.      Right lower leg: No edema.      Left lower leg: No edema.   Skin:     General: Skin is warm and dry.   Neurological:      Mental Status: She is alert and oriented to person, place, and time.      Cranial Nerves: Cranial nerves 2-12 are intact.      Motor: Motor strength is normal.     Deep Tendon Reflexes:      Reflex Scores:       Bicep reflexes are 2+ on the right side and 2+ on the left side.       Patellar reflexes are 2+ on the right side and 2+ on the left side.       Achilles reflexes are 2+ on the right side and 2+ on the left side.         NEUROLOGICAL EXAMINATION:     MENTAL STATUS   Oriented to person, place, and time.     CRANIAL NERVES   Cranial nerves II through XII intact.     MOTOR EXAM   Overall muscle tone: normal    Strength   Strength 5/5 throughout.     REFLEXES     Reflexes   Right biceps:  2+  Left biceps: 2+  Right patellar: 2+  Left patellar: 2+  Right achilles: 2+  Left achilles: 2+  Right plantar: normal  Left plantar: normal    SENSORY EXAM   Light touch normal.       Significant Labs: All pertinent lab results from the past 24 hours have been reviewed.    Significant Imaging: I have reviewed all pertinent imaging results/findings within the past 24 hours.

## 2024-07-03 NOTE — ASSESSMENT & PLAN NOTE
Rapid response called 7/2 for AMS. Initial concern for AMS 2/2 opioid administration after receiving 2mg Dilaudid but no improvement after repeated Narcan administration. Stroke code called; CTA negative. No focal deficits. She appeared to be post-ictal (no hx of seizures). Tegretol level found to be elevated and medical toxicology consulted.  Mentation now improved      --Will stop EEG  --Pain regimen resumed  --Daily Tegretol level   --Medical toxicology following  --Resume tegretol when level is therapeutic at 200mg TID

## 2024-07-03 NOTE — ASSESSMENT & PLAN NOTE
History of Acute Chest Syndrome and Pulmonary embolism.  Came in to hospital with chest pain.      --Will need to reestablish care with heme/onc  --Multimodal pain control, continue dilaudid

## 2024-07-03 NOTE — SUBJECTIVE & OBJECTIVE
Interval History/Significant Events: Overnight tegretol level found to be elevated and medical toxicology consulted.  Mentation improved and tegretol level improving. Stable for stepdown.      Review of Systems   Respiratory:  Negative for shortness of breath.    Cardiovascular:  Positive for leg swelling. Negative for chest pain.   Gastrointestinal:  Negative for abdominal pain.   Psychiatric/Behavioral:  Negative for confusion.      Objective:     Vital Signs (Most Recent):  Temp: 99.2 °F (37.3 °C) (07/03/24 1100)  Pulse: 92 (07/03/24 1300)  Resp: (!) 31 (07/03/24 1300)  BP: 133/73 (07/03/24 1200)  SpO2: 100 % (07/03/24 1300) Vital Signs (24h Range):  Temp:  [98.3 °F (36.8 °C)-99.2 °F (37.3 °C)] 99.2 °F (37.3 °C)  Pulse:  [74-92] 92  Resp:  [10-37] 31  SpO2:  [95 %-100 %] 100 %  BP: (123-164)/() 133/73   Weight: 86.2 kg (190 lb 0.6 oz)  Body mass index is 34.76 kg/m².      Intake/Output Summary (Last 24 hours) at 7/3/2024 1327  Last data filed at 7/3/2024 1246  Gross per 24 hour   Intake 497.2 ml   Output 2690 ml   Net -2192.8 ml          Physical Exam  Vitals and nursing note reviewed.   Constitutional:       General: She is not in acute distress.     Appearance: She is ill-appearing.   HENT:      Head: Normocephalic.      Mouth/Throat:      Mouth: Mucous membranes are moist.      Pharynx: Oropharynx is clear.   Eyes:      Pupils: Pupils are equal, round, and reactive to light.   Cardiovascular:      Rate and Rhythm: Normal rate.      Pulses: Normal pulses.   Pulmonary:      Effort: Pulmonary effort is normal. No respiratory distress.      Breath sounds: Normal breath sounds.   Abdominal:      General: Abdomen is flat.      Palpations: Abdomen is soft.   Musculoskeletal:      Cervical back: Normal range of motion.      Right lower leg: No edema.      Left lower leg: Edema present.      Comments: Left lower arm swelling   Skin:     General: Skin is warm and dry.      Capillary Refill: Capillary refill takes  less than 2 seconds.   Neurological:      General: No focal deficit present.      Mental Status: She is alert. Mental status is at baseline.   Psychiatric:         Mood and Affect: Mood normal.            Vents:     Lines/Drains/Airways       Drain  Duration                  Urethral Catheter 07/02/24 1828 <1 day              Peripheral Intravenous Line  Duration                  Midline Catheter - Single Lumen 07/02/24 Right basilic vein (medial side of arm) 18g x 10cm 1 day         Peripheral IV - Single Lumen 07/01/24 1915 20 G 1 3/4 in Yes Anterior;Left Upper Arm 1 day                  Significant Labs:    CBC/Anemia Profile:  Recent Labs   Lab 07/02/24  0533 07/02/24  1440 07/02/24  1546 07/03/24  0503   WBC 6.72  --  8.05 6.13   HGB 9.9*  --  11.4* 9.8*   HCT 30.5* 31* 34.7* 28.5*     --  328 280   MCV 78*  --  77* 74*   RDW 20.8*  --  21.3* 20.7*        Chemistries:  Recent Labs   Lab 07/02/24  0533 07/02/24  1546 07/02/24  1642 07/03/24  0503    142 141 141   K 4.2 3.8 4.2 3.8    106 105 106   CO2 26 26 24 28   BUN 13 10 11 10   CREATININE 1.5* 1.4 1.5* 1.0   CALCIUM 8.5* 9.1 8.9 8.8   ALBUMIN  --  3.4* 3.3* 2.9*   PROT  --  7.3 7.5 6.3   BILITOT  --  0.4 0.3 0.3   ALKPHOS  --  82 80 71   ALT  --  13 14 14   AST  --  30 37 31   MG 1.9  --   --  2.1   PHOS  --   --   --  3.6       All pertinent labs within the past 24 hours have been reviewed.    Significant Imaging:  I have reviewed all pertinent imaging results/findings within the past 24 hours.

## 2024-07-03 NOTE — ASSESSMENT & PLAN NOTE
Ms. Malone is a 44 year old female with a past history of sickle cell beta thalassemia that developed acute encephalopathy with subsequent discovery of supratherapeutic carbamazapine level. Neurology was consulted for question of seizures. EEG was remarkable only for moderate-severe encephalopathy without epileptiform discharges. AMS was likely secondary to carbamazepine toxicity, and improvement has been demonstrated after downtrend in serum concentration. On neurological exam, patient seems to be back at baseline. Patient also denies TN pain after discontinuation of carbamazepine. Oftentimes, patients can achieve full remission of TN pain without need for life-long medical therapy.     Recommendations  - Would consider discontinuation of carbamazepine given patient's supratherapeutic event  - Recommend outpatient routine follow up with neurology for TN management    Neurology will sign off. Thank you for your consult. Please reach out with questions or concerns.

## 2024-07-04 LAB
ALBUMIN SERPL BCP-MCNC: 3 G/DL (ref 3.5–5.2)
ALP SERPL-CCNC: 73 U/L (ref 55–135)
ALT SERPL W/O P-5'-P-CCNC: 15 U/L (ref 10–44)
ANION GAP SERPL CALC-SCNC: 8 MMOL/L (ref 8–16)
AST SERPL-CCNC: 27 U/L (ref 10–40)
BASOPHILS # BLD AUTO: 0.03 K/UL (ref 0–0.2)
BASOPHILS NFR BLD: 0.4 % (ref 0–1.9)
BILIRUB SERPL-MCNC: 0.3 MG/DL (ref 0.1–1)
BUN SERPL-MCNC: 11 MG/DL (ref 6–20)
CALCIUM SERPL-MCNC: 9.1 MG/DL (ref 8.7–10.5)
CARBAMAZEPINE SERPL-MCNC: 8.7 UG/ML (ref 4–12)
CHLORIDE SERPL-SCNC: 103 MMOL/L (ref 95–110)
CO2 SERPL-SCNC: 27 MMOL/L (ref 23–29)
CREAT SERPL-MCNC: 1.2 MG/DL (ref 0.5–1.4)
DIFFERENTIAL METHOD BLD: ABNORMAL
EOSINOPHIL # BLD AUTO: 0.2 K/UL (ref 0–0.5)
EOSINOPHIL NFR BLD: 2.9 % (ref 0–8)
ERYTHROCYTE [DISTWIDTH] IN BLOOD BY AUTOMATED COUNT: 20.5 % (ref 11.5–14.5)
EST. GFR  (NO RACE VARIABLE): 56.5 ML/MIN/1.73 M^2
GLUCOSE SERPL-MCNC: 96 MG/DL (ref 70–110)
HCT VFR BLD AUTO: 27.1 % (ref 37–48.5)
HGB BLD-MCNC: 9.4 G/DL (ref 12–16)
IMM GRANULOCYTES # BLD AUTO: 0.01 K/UL (ref 0–0.04)
IMM GRANULOCYTES NFR BLD AUTO: 0.1 % (ref 0–0.5)
LYMPHOCYTES # BLD AUTO: 3.3 K/UL (ref 1–4.8)
LYMPHOCYTES NFR BLD: 45.3 % (ref 18–48)
MAGNESIUM SERPL-MCNC: 2 MG/DL (ref 1.6–2.6)
MCH RBC QN AUTO: 25.9 PG (ref 27–31)
MCHC RBC AUTO-ENTMCNC: 34.7 G/DL (ref 32–36)
MCV RBC AUTO: 75 FL (ref 82–98)
MONOCYTES # BLD AUTO: 0.8 K/UL (ref 0.3–1)
MONOCYTES NFR BLD: 10.8 % (ref 4–15)
NEUTROPHILS # BLD AUTO: 3 K/UL (ref 1.8–7.7)
NEUTROPHILS NFR BLD: 40.5 % (ref 38–73)
NRBC BLD-RTO: 1 /100 WBC
PHOSPHATE SERPL-MCNC: 3.4 MG/DL (ref 2.7–4.5)
PLATELET # BLD AUTO: 252 K/UL (ref 150–450)
PMV BLD AUTO: 9.6 FL (ref 9.2–12.9)
POTASSIUM SERPL-SCNC: 3.7 MMOL/L (ref 3.5–5.1)
PROT SERPL-MCNC: 6.3 G/DL (ref 6–8.4)
RBC # BLD AUTO: 3.63 M/UL (ref 4–5.4)
SODIUM SERPL-SCNC: 138 MMOL/L (ref 136–145)
WBC # BLD AUTO: 7.3 K/UL (ref 3.9–12.7)

## 2024-07-04 PROCEDURE — 21400001 HC TELEMETRY ROOM

## 2024-07-04 PROCEDURE — 63600175 PHARM REV CODE 636 W HCPCS

## 2024-07-04 PROCEDURE — 85025 COMPLETE CBC W/AUTO DIFF WBC: CPT | Performed by: NURSE PRACTITIONER

## 2024-07-04 PROCEDURE — C1751 CATH, INF, PER/CENT/MIDLINE: HCPCS

## 2024-07-04 PROCEDURE — 25000003 PHARM REV CODE 250: Performed by: STUDENT IN AN ORGANIZED HEALTH CARE EDUCATION/TRAINING PROGRAM

## 2024-07-04 PROCEDURE — 36573 INSJ PICC RS&I 5 YR+: CPT

## 2024-07-04 PROCEDURE — 99233 SBSQ HOSP IP/OBS HIGH 50: CPT | Mod: ,,, | Performed by: STUDENT IN AN ORGANIZED HEALTH CARE EDUCATION/TRAINING PROGRAM

## 2024-07-04 PROCEDURE — 25000003 PHARM REV CODE 250

## 2024-07-04 PROCEDURE — 25000003 PHARM REV CODE 250: Performed by: INTERNAL MEDICINE

## 2024-07-04 PROCEDURE — 63600175 PHARM REV CODE 636 W HCPCS: Performed by: INTERNAL MEDICINE

## 2024-07-04 PROCEDURE — 83735 ASSAY OF MAGNESIUM: CPT

## 2024-07-04 PROCEDURE — 94761 N-INVAS EAR/PLS OXIMETRY MLT: CPT

## 2024-07-04 PROCEDURE — 02HV33Z INSERTION OF INFUSION DEVICE INTO SUPERIOR VENA CAVA, PERCUTANEOUS APPROACH: ICD-10-PCS | Performed by: INTERNAL MEDICINE

## 2024-07-04 PROCEDURE — 63600175 PHARM REV CODE 636 W HCPCS: Performed by: STUDENT IN AN ORGANIZED HEALTH CARE EDUCATION/TRAINING PROGRAM

## 2024-07-04 PROCEDURE — 80053 COMPREHEN METABOLIC PANEL: CPT | Performed by: NURSE PRACTITIONER

## 2024-07-04 PROCEDURE — A4216 STERILE WATER/SALINE, 10 ML: HCPCS | Performed by: STUDENT IN AN ORGANIZED HEALTH CARE EDUCATION/TRAINING PROGRAM

## 2024-07-04 PROCEDURE — 80156 ASSAY CARBAMAZEPINE TOTAL: CPT

## 2024-07-04 PROCEDURE — 76937 US GUIDE VASCULAR ACCESS: CPT

## 2024-07-04 PROCEDURE — 84100 ASSAY OF PHOSPHORUS: CPT | Performed by: NURSE PRACTITIONER

## 2024-07-04 RX ORDER — SODIUM CHLORIDE 0.9 % (FLUSH) 0.9 %
10 SYRINGE (ML) INJECTION EVERY 6 HOURS
Status: DISCONTINUED | OUTPATIENT
Start: 2024-07-04 | End: 2024-07-12 | Stop reason: HOSPADM

## 2024-07-04 RX ORDER — SODIUM CHLORIDE 0.9 % (FLUSH) 0.9 %
10 SYRINGE (ML) INJECTION
Status: DISCONTINUED | OUTPATIENT
Start: 2024-07-04 | End: 2024-07-12 | Stop reason: HOSPADM

## 2024-07-04 RX ORDER — TIZANIDINE 4 MG/1
4 TABLET ORAL ONCE
Status: COMPLETED | OUTPATIENT
Start: 2024-07-04 | End: 2024-07-04

## 2024-07-04 RX ADMIN — HYDROMORPHONE HYDROCHLORIDE 2 MG: 1 INJECTION, SOLUTION INTRAMUSCULAR; INTRAVENOUS; SUBCUTANEOUS at 02:07

## 2024-07-04 RX ADMIN — PROMETHAZINE HYDROCHLORIDE 12.5 MG: 25 INJECTION INTRAMUSCULAR; INTRAVENOUS at 10:07

## 2024-07-04 RX ADMIN — PROMETHAZINE HYDROCHLORIDE 25 MG: 12.5 TABLET ORAL at 08:07

## 2024-07-04 RX ADMIN — CARVEDILOL 25 MG: 25 TABLET, FILM COATED ORAL at 08:07

## 2024-07-04 RX ADMIN — HYDROMORPHONE HYDROCHLORIDE 2 MG: 1 INJECTION, SOLUTION INTRAMUSCULAR; INTRAVENOUS; SUBCUTANEOUS at 06:07

## 2024-07-04 RX ADMIN — HYDROMORPHONE HYDROCHLORIDE 2 MG: 1 INJECTION, SOLUTION INTRAMUSCULAR; INTRAVENOUS; SUBCUTANEOUS at 09:07

## 2024-07-04 RX ADMIN — TIZANIDINE 4 MG: 4 TABLET ORAL at 01:07

## 2024-07-04 RX ADMIN — PROMETHAZINE HYDROCHLORIDE 25 MG: 12.5 TABLET ORAL at 12:07

## 2024-07-04 RX ADMIN — VANCOMYCIN HYDROCHLORIDE 1500 MG: 1.5 INJECTION, POWDER, LYOPHILIZED, FOR SOLUTION INTRAVENOUS at 08:07

## 2024-07-04 RX ADMIN — MUPIROCIN: 20 OINTMENT TOPICAL at 08:07

## 2024-07-04 RX ADMIN — AMLODIPINE BESYLATE 10 MG: 10 TABLET ORAL at 08:07

## 2024-07-04 RX ADMIN — HYDROMORPHONE HYDROCHLORIDE 2 MG: 1 INJECTION, SOLUTION INTRAMUSCULAR; INTRAVENOUS; SUBCUTANEOUS at 10:07

## 2024-07-04 RX ADMIN — DIPHENHYDRAMINE HYDROCHLORIDE 25 MG: 25 CAPSULE ORAL at 10:07

## 2024-07-04 RX ADMIN — FLUOXETINE HYDROCHLORIDE 40 MG: 20 CAPSULE ORAL at 08:07

## 2024-07-04 RX ADMIN — Medication 10 ML: at 09:07

## 2024-07-04 RX ADMIN — Medication 10 ML: at 06:07

## 2024-07-04 RX ADMIN — PROMETHAZINE HYDROCHLORIDE 25 MG: 12.5 TABLET ORAL at 11:07

## 2024-07-04 RX ADMIN — SENNOSIDES AND DOCUSATE SODIUM 2 TABLET: 50; 8.6 TABLET ORAL at 08:07

## 2024-07-04 RX ADMIN — APIXABAN 5 MG: 5 TABLET, FILM COATED ORAL at 08:07

## 2024-07-04 RX ADMIN — POTASSIUM CHLORIDE 20 MEQ: 1500 TABLET, EXTENDED RELEASE ORAL at 08:07

## 2024-07-04 RX ADMIN — DIPHENHYDRAMINE HYDROCHLORIDE 25 MG: 25 CAPSULE ORAL at 06:07

## 2024-07-04 NOTE — PLAN OF CARE
Problem: Adult Inpatient Plan of Care  Goal: Absence of Hospital-Acquired Illness or Injury  Outcome: Progressing  Intervention: Identify and Manage Fall Risk  Flowsheets (Taken 7/4/2024 1854)  Safety Promotion/Fall Prevention:   assistive device/personal item within reach   bed alarm set   bedside commode chair   diversional activities provided   Fall Risk reviewed with patient/family   Fall Risk signage in place   commode/urinal/bedpan at bedside   family to remain at bedside   high risk medications identified   in recliner, wheels locked   lighting adjusted   nonskid shoes/socks when out of bed   muscle strengthening facilitated   radiant warmer side rails raised x4   Roll belt self-releasing   side rails raised x 2   room near unit station  Intervention: Prevent Skin Injury  Flowsheets (Taken 7/4/2024 1854)  Body Position: position changed independently  Skin Protection:   incontinence pads utilized   pulse oximeter probe site changed  Device Skin Pressure Protection:   absorbent pad utilized/changed   adhesive use limited   tubing/devices free from skin contact  Intervention: Prevent and Manage VTE (Venous Thromboembolism) Risk  Flowsheets (Taken 7/4/2024 1854)  VTE Prevention/Management:   remove, assess skin, and reapply sequential compression device   ambulation promoted   bleeding precations maintained   bleeding risk assessed   bleeding risk factor(s) identified, provider notified   dorsiflexion/plantar flexion performed   fluids promoted   ROM (active) performed  Intervention: Prevent Infection  Flowsheets (Taken 7/4/2024 1854)  Infection Prevention:   cohorting utilized   hand hygiene promoted   single patient room provided   environmental surveillance performed   equipment surfaces disinfected   rest/sleep promoted  Goal: Optimal Comfort and Wellbeing  Outcome: Progressing  Intervention: Monitor Pain and Promote Comfort  Flowsheets (Taken 7/4/2024 1854)  Pain Management Interventions:    around-the-clock dosing utilized   awakened for pain meds per patient request   care clustered   cold applied   diversional activity provided   heat applied   massage provided   medicated cooling pads   medication offered   pain management plan reviewed with patient/caregiver   premedicated for activity   relaxation techniques promoted   quiet environment facilitated   position adjusted   pillow support provided   warm blanket provided   breathing exercises utilized  Intervention: Provide Person-Centered Care  Flowsheets (Taken 7/4/2024 1854)  Trust Relationship/Rapport:   care explained   questions answered   emotional support provided   questions encouraged   choices provided   reassurance provided   thoughts/feelings acknowledged     Problem: Fall Injury Risk  Goal: Absence of Fall and Fall-Related Injury  Outcome: Progressing  Intervention: Identify and Manage Contributors  Flowsheets (Taken 7/4/2024 1854)  Self-Care Promotion:   independence encouraged   meal set-up provided   adaptive equipment use encouraged   BADL personal objects within reach   BADL personal routines maintained  Medication Review/Management:   medications reviewed   high-risk medications identified   provider consulted   pharmacy consulted  Intervention: Promote Injury-Free Environment  Flowsheets (Taken 7/4/2024 1854)  Safety Promotion/Fall Prevention:   assistive device/personal item within reach   bed alarm set   bedside commode chair   diversional activities provided   Fall Risk reviewed with patient/family   Fall Risk signage in place   commode/urinal/bedpan at bedside   family to remain at bedside   high risk medications identified   in recliner, wheels locked   lighting adjusted   nonskid shoes/socks when out of bed   muscle strengthening facilitated   radiant warmer side rails raised x4   Roll belt self-releasing   side rails raised x 2   room near unit station     Problem: Acute Kidney Injury/Impairment  Goal: Fluid and Electrolyte  Balance  Outcome: Progressing  Intervention: Monitor and Manage Fluid and Electrolyte Balance  Flowsheets (Taken 7/4/2024 1854)  Fluid/Electrolyte Management:   electrolyte-binding therapy initiated   fluids adjusted   fluids provided   intravenous fluids adjusted   intravenous fluid replacement initiated     Problem: Infection  Goal: Absence of Infection Signs and Symptoms  Outcome: Progressing  Intervention: Prevent or Manage Infection  Flowsheets (Taken 7/4/2024 1854)  Fever Reduction/Comfort Measures:   aerosol temperature decreased   fluid intake increased   lightweight clothing   lightweight bedding  Infection Management: aseptic technique maintained  Isolation Precautions: precautions maintained     Problem: Skin Injury Risk Increased  Goal: Skin Health and Integrity  Outcome: Progressing  Intervention: Optimize Skin Protection  Flowsheets (Taken 7/4/2024 1854)  Pressure Reduction Techniques:   frequent weight shift encouraged   positioned off wounds   heels elevated off bed   pressure points protected   rest period provided between sit times   weight shift assistance provided  Pressure Reduction Devices:   foam padding utilized   chair cushion utilized   pressure-redistributing mattress utilized  Skin Protection:   incontinence pads utilized   pulse oximeter probe site changed  Activity Management: Ambulated in room - L4  Head of Bed (HOB) Positioning: HOB at 30-45 degrees

## 2024-07-04 NOTE — PROCEDURES
DATE: 7/3/24    EEG NUMBER: FH -2    REFERRING PHYSICIAN:  Dr. Linda     This EEG was performed to assess for subclinical seizures      ELECTROENCEPHALOGRAM REPORT     METHODOLOGY:  Electroencephalographic (EEG) recording is with electrodes placed according to the International 10-20 placement system.  Thirty two (32) channels of digital signal are simultaneously recorded from the scalp and may include EKG, EMG, and/or eye monitors.   Recording band pass was 0.1 to 512 hz.  Digital video recording of the patient is simultaneously recorded with the EEG.  The nursing staff report clinical symptoms and may press an event button when the patient has symptoms of clinical interest to the treating physicians.  EEG and video recording is stored and archived in digital format.  The entire recording is visually reviewed, and the times identified by computer analysis as being spikes or seizures are reviewed again.  Activation procedures which include photic stimulation, hyperventilation and instructing patients to perform simple task are done in selected patients.   Compresses spectral analysis (CSA) is also performed on the activity recorded from each individual channel.  This is displayed as a power display of frequencies from 0 to 30 Hz over time.   The CSA analysis is done and displayed continuously.  This is reviewed for asymmetries in power between homologous areas of the scalp and for presence of changes in power which can be seen when seizures occur.  Sections of suspected abnormalities on the CSA is then compared with the original EEG recording.                KeyCAPT[employer] software was also utilized in the review of this study.  This software suite analyzes the EEG recording in multiple domains.  Coherence and rhythmicity is computed to identify EEG sections which may contain organized seizures.  Each channel undergoes analysis to detect presence of spike and sharp waves which have special and morphological  characteristic of epileptic activity.  The routine EEG recording is converted from spacial into frequency domain.  This is then displayed comparing homologous areas to identify areas of significant asymmetry.  Algorithm to identify non-cortically generated artifact is used to separate eye movement, EMG and other artifact from the EEG.     Start on July 3, 2024 at hours 7 minute 0 seconds 19   End on July 3, 2024 at hours 13 minute 44 seconds 42   The total time of EEG recording for the study was 5 hours and 47 minutes    EEG FINDINGS:  The recording was obtained with a number of standard bipolar and referential montages during wakefulness, drowsiness and sleep.  In the alert state, the posterior background rhythm was a symmetric, well-modulated 6 Hz activity, which reacted symmetrically to eye opening.  Intermittent generalized synchronous semi rhythmical delta activity was noted.. No abnormalities were activated by photic stimulation.  During drowsiness, the background rhythm waxed and waned and there were periods of slowing.  During stage II sleep, symmetric V waves and sleep spindles were noted. There were no interictal epileptiform abnormalities and no clinical or electrographic seizures were recorded.    The EKG channel revealed a sinus rhythm.     IMPRESSION:  This is an abnormal EEG during wakefulness, drowsiness and sleep. Diffuse slowing was noted.      CLINICAL CORRELATION:  The patient is a 44-year-old female with a history of sickle cell crisis was currently not maintained on any antiseizure medications.  This is an abnormal EEG during wakefulness, drowsiness and sleep.  The overall degree of disorganization and slowing for given age is suggestive of a moderate ti severe encephalopathy, likely a toxic metabolic encephalopathy.  There is no evidence of an epileptic process on this recording.  No seizures were recorded during this study.

## 2024-07-04 NOTE — SUBJECTIVE & OBJECTIVE
Interval History/Significant Events:  LUE swelling looks better but patient still expresses concerns.  Says she is itching with Dilaudid in wanting IV Benadryl.  Otherwise feeling well      Objective:     Vital Signs (Most Recent):  Temp: 98.6 °F (37 °C) (07/04/24 0700)  Pulse: 77 (07/04/24 1400)  Resp: 11 (07/04/24 1440)  BP: 119/61 (07/04/24 1400)  SpO2: 100 % (07/04/24 1400) Vital Signs (24h Range):  Temp:  [98.2 °F (36.8 °C)-99 °F (37.2 °C)] 98.6 °F (37 °C)  Pulse:  [64-84] 77  Resp:  [9-64] 11  SpO2:  [95 %-100 %] 100 %  BP: (110-160)/() 119/61   Weight: 86.2 kg (190 lb 0.6 oz)  Body mass index is 34.76 kg/m².      Intake/Output Summary (Last 24 hours) at 7/4/2024 1443  Last data filed at 7/4/2024 1104  Gross per 24 hour   Intake 247.2 ml   Output 3225 ml   Net -2977.8 ml          Physical Exam  Vitals and nursing note reviewed.   Constitutional:       General: She is not in acute distress.     Appearance: She is ill-appearing.   HENT:      Head: Normocephalic.      Mouth/Throat:      Mouth: Mucous membranes are moist.      Pharynx: Oropharynx is clear.   Eyes:      Pupils: Pupils are equal, round, and reactive to light.   Cardiovascular:      Rate and Rhythm: Normal rate.      Pulses: Normal pulses.   Pulmonary:      Effort: Pulmonary effort is normal. No respiratory distress.      Breath sounds: Normal breath sounds.   Abdominal:      General: Abdomen is flat.      Palpations: Abdomen is soft.   Musculoskeletal:      Left upper arm: Swelling present.      Cervical back: Normal range of motion.      Right lower leg: No edema.      Left lower leg: Edema present.      Comments: Left lower arm swelling   Skin:     General: Skin is warm and dry.      Capillary Refill: Capillary refill takes less than 2 seconds.   Neurological:      General: No focal deficit present.      Mental Status: She is alert. Mental status is at baseline.   Psychiatric:         Mood and Affect: Mood normal.            Vents:      Lines/Drains/Airways       Peripherally Inserted Central Catheter Line  Duration             PICC Double Lumen 07/04/24 1347 right basilic <1 day              Drain  Duration                  Urethral Catheter 07/02/24 1828 1 day              Peripheral Intravenous Line  Duration                  Peripheral IV - Single Lumen 07/01/24 1915 20 G 1 3/4 in Yes Anterior;Left Upper Arm 2 days                  Significant Labs:    CBC/Anemia Profile:  Recent Labs   Lab 07/02/24  1546 07/03/24  0503 07/04/24  0540   WBC 8.05 6.13 7.30   HGB 11.4* 9.8* 9.4*   HCT 34.7* 28.5* 27.1*    280 252   MCV 77* 74* 75*   RDW 21.3* 20.7* 20.5*        Chemistries:  Recent Labs   Lab 07/02/24  1642 07/03/24  0503 07/04/24  0540    141 138   K 4.2 3.8 3.7    106 103   CO2 24 28 27   BUN 11 10 11   CREATININE 1.5* 1.0 1.2   CALCIUM 8.9 8.8 9.1   ALBUMIN 3.3* 2.9* 3.0*   PROT 7.5 6.3 6.3   BILITOT 0.3 0.3 0.3   ALKPHOS 80 71 73   ALT 14 14 15   AST 37 31 27   MG  --  2.1 2.0   PHOS  --  3.6 3.4       All pertinent labs within the past 24 hours have been reviewed.    Significant Imaging:  I have reviewed all pertinent imaging results/findings within the past 24 hours.

## 2024-07-04 NOTE — PROCEDURES
"Nazanin Malone is a 44 y.o. female patient.    Temp: 98.6 °F (37 °C) (07/04/24 0700)  Pulse: 72 (07/04/24 1200)  Resp: 13 (07/04/24 1200)  BP: (!) 148/106 (07/04/24 1200)  SpO2: 98 % (07/04/24 1200)  Weight: 86.2 kg (190 lb 0.6 oz) (06/27/24 1457)  Height: 5' 2" (157.5 cm) (06/27/24 1457)    PICC  Date/Time: 7/4/2024 1:47 PM  Performed by: Jeronimo Alvarenga RN  Assisting provider: Shelly Wilkes LPN  Consent Done: Yes  Time out: Immediately prior to procedure a time out was called to verify the correct patient, procedure, equipment, support staff and site/side marked as required  Indications: med administration and vascular access  Anesthesia: local infiltration  Local anesthetic: lidocaine 1% without epinephrine  Anesthetic Total (mL): 3  Description of findings: picc  Preparation: skin prepped with ChloraPrep  Skin prep agent dried: skin prep agent completely dried prior to procedure  Sterile barriers: all five maximum sterile barriers used - cap, mask, sterile gown, sterile gloves, and large sterile sheet  Hand hygiene: hand hygiene performed prior to central venous catheter insertion  Location details: right basilic  Catheter type: double lumen  Catheter size: 5 Fr  Catheter Length: 39cm    Ultrasound guidance: yes  Vessel Caliber: medium and patent, compressibility normal  Vascular Doppler: not done  Needle advanced into vessel with real time Ultrasound guidance.  Guidewire confirmed in vessel.  Image recorded and saved.  Sterile sheath used.  no esophageal manometryNumber of attempts: 1  Post-procedure: blood return through all ports, sterile dressing applied and chlorhexidine patch  Technical procedures used: 3CG  Specimens: No  Implants: No  Assessment: placement verified by x-ray  Complications: none          Name Shelly Wilkes LPN  7/4/2024    "

## 2024-07-04 NOTE — CONSULTS
D/L PICC placed in right basilic vein, 39 cm in length with 0 cm exposed. Arm circumference 43 cm. Lot# [id number]

## 2024-07-04 NOTE — ASSESSMENT & PLAN NOTE
Rapid response called 7/2 for AMS. Initial concern for AMS 2/2 opioid administration after receiving 2mg Dilaudid but no improvement after repeated Narcan administration. Stroke code called; CTA negative. No focal deficits. She appeared to be post-ictal (no hx of seizures). Tegretol level found to be elevated and medical toxicology consulted.  Mentation now improved      - EEG negative for seizure.  Tegretol level normalized.  Med tox following.  Per neuro, does not need to restart Tegretol

## 2024-07-04 NOTE — PLAN OF CARE
MICU DAILY GOALS     Family/Goals of care/Code Status   Code Status: Full Code    24H Vital Sign Range  Temp:  [98.2 °F (36.8 °C)-99.2 °F (37.3 °C)]   Pulse:  [68-92]   Resp:  [9-64]   BP: (110-164)/()   SpO2:  [95 %-100 %]      Shift Events (include procedures and significant events)   No acute events throughout shift, PRN meds given. See MAR    AWAKE RASS: Goal - RASS Goal: 0-->alert and calm  Actual - RASS ([place] Agitation-Sedation Scale): alert and calm    Restraint necessity: Not necessary   BREATHE SBT: Not intubated    Coordinate A & B, analgesics/sedatives Pain: managed   SAT: Not intubated   Delirium CAM-ICU: Overall CAM-ICU: Negative   Early(intubated/ Progressive (non-intubated) Mobility MOVE Screen (INTUBATED ONLY): Not intubated    Activity: Activity Management: Rolling - L1   Feeding/Nutrition Diet order: Diet/Nutrition Received: regular,     Thrombus DVT prophylaxis: VTE Required Core Measure: Pharmacological prophylaxis initiated/maintained   HOB Elevation Head of Bed (HOB) Positioning: HOB at 30 degrees   Ulcer Prophylaxis GI: no   Glucose control managed     Skin Skin assessed during: Q Shift Change    Sacrum intact/not altered? Yes  Heels intact/not altered? Yes  Surgical wound? No    CHECK ONE!   (no altered skin or altered skin) and sub boxes:  [] No Altered Skin Integrity Present    []Prevention Measures Documented    [x] Altered Skin Integrity Present or Discovered   [x] LDA present in EPIC, daily doc completed              [] LDA added if not in EPIC (describe wound).                    When describing wound, do not stage, use descriptive words only.    [] Wound Image Taken (required on admit,                   transfer/discharge and every Tuesday)    Wound Care Consulted? No    4 EYES:  Attending Nurse (1st set of eyes): FRANCIS Amezquita    Second RN/Staff Member (2nd set of eyes): FRANCIS Mohan     Bowel Function no issues    Indwelling Catheter Necessity      Urethral Catheter 07/02/24  1828-Reason for Continuing Urinary Catheterization: Urinary retention, Critically ill in ICU and requiring hourly monitoring of intake/output          De-escalation Antibiotics No        VS and assessment per flow sheet, patient progressing towards goals as tolerated, plan of care reviewed with  Nazanin Malone , all concerns addressed, will continue to monitor.

## 2024-07-04 NOTE — PROGRESS NOTES
[facility] - Card[facility] ICU  Critical Care Medicine  Progress Note    Patient Name: Nazanin Malone  MRN: [id number]  Admission Date: 6/26/2024  Hospital Length of Stay: 7 days  Code Status: Full Code  Attending Provider: Waldemar Linda MD  Primary Care Provider: Pee Montgomery MD   Principal Problem: Encephalopathy    Subjective:       Interval History/Significant Events:  LUE swelling looks better but patient still expresses concerns.  Says she is itching with Dilaudid in wanting IV Benadryl.  Otherwise feeling well      Objective:     Vital Signs (Most Recent):  Temp: 98.6 °F (37 °C) (07/04/24 0700)  Pulse: 77 (07/04/24 1400)  Resp: 11 (07/04/24 1440)  BP: 119/61 (07/04/24 1400)  SpO2: 100 % (07/04/24 1400) Vital Signs (24h Range):  Temp:  [98.2 °F (36.8 °C)-99 °F (37.2 °C)] 98.6 °F (37 °C)  Pulse:  [64-84] 77  Resp:  [9-64] 11  SpO2:  [95 %-100 %] 100 %  BP: (110-160)/() 119/61   Weight: 86.2 kg (190 lb 0.6 oz)  Body mass index is 34.76 kg/m².      Intake/Output Summary (Last 24 hours) at 7/4/2024 1443  Last data filed at 7/4/2024 1104  Gross per 24 hour   Intake 247.2 ml   Output 3225 ml   Net -2977.8 ml          Physical Exam  Vitals and nursing note reviewed.   Constitutional:       General: She is not in acute distress.     Appearance: She is ill-appearing.   HENT:      Head: Normocephalic.      Mouth/Throat:      Mouth: Mucous membranes are moist.      Pharynx: Oropharynx is clear.   Eyes:      Pupils: Pupils are equal, round, and reactive to light.   Cardiovascular:      Rate and Rhythm: Normal rate.      Pulses: Normal pulses.   Pulmonary:      Effort: Pulmonary effort is normal. No respiratory distress.      Breath sounds: Normal breath sounds.   Abdominal:      General: Abdomen is flat.      Palpations: Abdomen is soft.   Musculoskeletal:      Left upper arm: Swelling present.      Cervical back: Normal range of motion.      Right lower leg: No edema.      Left lower leg: Edema  present.      Comments: Left lower arm swelling   Skin:     General: Skin is warm and dry.      Capillary Refill: Capillary refill takes less than 2 seconds.   Neurological:      General: No focal deficit present.      Mental Status: She is alert. Mental status is at baseline.   Psychiatric:         Mood and Affect: Mood normal.            Vents:     Lines/Drains/Airways       Peripherally Inserted Central Catheter Line  Duration             PICC Double Lumen 07/04/24 1347 right basilic <1 day              Drain  Duration                  Urethral Catheter 07/02/24 1828 1 day              Peripheral Intravenous Line  Duration                  Peripheral IV - Single Lumen 07/01/24 1915 20 G 1 3/4 in Yes Anterior;Left Upper Arm 2 days                  Significant Labs:    CBC/Anemia Profile:  Recent Labs   Lab 07/02/24  1546 07/03/24  0503 07/04/24  0540   WBC 8.05 6.13 7.30   HGB 11.4* 9.8* 9.4*   HCT 34.7* 28.5* 27.1*    280 252   MCV 77* 74* 75*   RDW 21.3* 20.7* 20.5*        Chemistries:  Recent Labs   Lab 07/02/24  1642 07/03/24  0503 07/04/24  0540    141 138   K 4.2 3.8 3.7    106 103   CO2 24 28 27   BUN 11 10 11   CREATININE 1.5* 1.0 1.2   CALCIUM 8.9 8.8 9.1   ALBUMIN 3.3* 2.9* 3.0*   PROT 7.5 6.3 6.3   BILITOT 0.3 0.3 0.3   ALKPHOS 80 71 73   ALT 14 14 15   AST 37 31 27   MG  --  2.1 2.0   PHOS  --  3.6 3.4       All pertinent labs within the past 24 hours have been reviewed.    Significant Imaging:  I have reviewed all pertinent imaging results/findings within the past 24 hours.    ABG  Recent Labs   Lab 07/02/24  1639   PH 7.341*   PO2 37*   PCO2 56.7*   HCO3 30.6*   BE 5*     Assessment/Plan:     Neuro  * Encephalopathy  Rapid response called 7/2 for AMS. Initial concern for AMS 2/2 opioid administration after receiving 2mg Dilaudid but no improvement after repeated Narcan administration. Stroke code called; CTA negative. No focal deficits. She appeared to be post-ictal (no hx of  seizures). Tegretol level found to be elevated and medical toxicology consulted.  Mentation now improved      - EEG negative for seizure.  Tegretol level normalized.  Med tox following.  Per neuro, does not need to restart Tegretol    Pulmonary  Intermittent asthma  No PFTs in epic.  Does not appear to be in exacerbation.      - Duonebs PRN    Cardiac/Vascular  Hypertension  Continue Coreg and amlodipine.  BP control.  Titrate as needed.    Renal/  FLOR (acute kidney injury)  Baseline Cr around 1.  1.5 at peak.  Now improving.    - strict I/O.  Renally dose medicines.    ID  Bacteremia  MRSE bacteremia on initial Bld Cx.  Seven 1 Bld Cx no growth.    - id consulted.  Planning for 14 total days of vancomycin.  PICC placed today.  Consulting case management for OPAT    Hematology  History of pulmonary embolism  CTA on 6/26 with no large central/saddle PE, nondiagnostic beyond level of R and L pulmonary arteries.  History of PE on eliquis    - Continue eliquis    Oncology  Sickle-cell disease with pain  History of Acute Chest Syndrome and Pulmonary embolism.  Chest pain on admit.  Has left and right port.    - we will reach out to hematology regarding exchange transfusions which has not occurred in some time  - we will need to reestablish care with heme Onc as an outpatient   - Multimodal pain control, continue dilaudid    Iron deficiency anemia  Remains microcytic.  Hgb variable.  Daily CBC    Other  Tegretol toxicity  See encephalopathy for plan.          Waldemar Linda MD  Critical Care Medicine  [facility] - Card[facility]

## 2024-07-04 NOTE — ASSESSMENT & PLAN NOTE
MRSE bacteremia on initial Bld Cx.  Seven 1 Bld Cx no growth.    - id consulted.  Planning for 14 total days of vancomycin.  PICC placed today.  Consulting case management for OPAT

## 2024-07-04 NOTE — NURSING TRANSFER
Nursing Transfer Note      7/4/2024   4:54 PM    Nurse giving handoff:FRANCIS Mohan  Nurse receiving handoff:FRANCIS Mcghee    Reason patient is being transferred: stepdown    Transfer From: 6075 to 01300    Transfer via wheelchair    Transfer with cardiac monitoring    Transported by FRANCIS Mohan and lourdes Ferrer    Transfer Vital Signs:  Blood Pressure:125/70  Heart Rate:74  O2:99  Temperature:98.9  Respirations:18    Telemetry:   Order for Tele Monitor? No  Additional Lines:     4eyes on Skin: yes    Medicines sent: yes    Any special needs or follow-up needed: n/a    Patient belongings transferred with patient: Yes    Chart send with patient: Yes    Notified: pt notified her family    Patient reassessed at: 7/4/24 at 1600 (date, time)  1  Upon arrival to floor: cardiac monitor applied, patient oriented to room, call bell in reach, and bed in lowest position

## 2024-07-04 NOTE — ASSESSMENT & PLAN NOTE
CTA on 6/26 with no large central/saddle PE, nondiagnostic beyond level of R and L pulmonary arteries.  History of PE on eliquis    - Continue eliquis

## 2024-07-04 NOTE — ASSESSMENT & PLAN NOTE
History of Acute Chest Syndrome and Pulmonary embolism.  Chest pain on admit.  Has left and right port.    - we will reach out to hematology regarding exchange transfusions which has not occurred in some time  - we will need to reestablish care with heme Onc as an outpatient   - Multimodal pain control, continue dilaudid

## 2024-07-05 LAB
ALBUMIN SERPL BCP-MCNC: 3.1 G/DL (ref 3.5–5.2)
ALP SERPL-CCNC: 72 U/L (ref 55–135)
ALT SERPL W/O P-5'-P-CCNC: 14 U/L (ref 10–44)
ANION GAP SERPL CALC-SCNC: 6 MMOL/L (ref 8–16)
AST SERPL-CCNC: 22 U/L (ref 10–40)
BASOPHILS # BLD AUTO: 0.03 K/UL (ref 0–0.2)
BASOPHILS NFR BLD: 0.4 % (ref 0–1.9)
BILIRUB SERPL-MCNC: 0.3 MG/DL (ref 0.1–1)
BUN SERPL-MCNC: 13 MG/DL (ref 6–20)
CALCIUM SERPL-MCNC: 9.2 MG/DL (ref 8.7–10.5)
CARBAMAZEPINE SERPL-MCNC: 5.2 UG/ML (ref 4–12)
CHLORIDE SERPL-SCNC: 104 MMOL/L (ref 95–110)
CO2 SERPL-SCNC: 29 MMOL/L (ref 23–29)
CREAT SERPL-MCNC: 1.1 MG/DL (ref 0.5–1.4)
DIFFERENTIAL METHOD BLD: ABNORMAL
EOSINOPHIL # BLD AUTO: 0.2 K/UL (ref 0–0.5)
EOSINOPHIL NFR BLD: 3.1 % (ref 0–8)
ERYTHROCYTE [DISTWIDTH] IN BLOOD BY AUTOMATED COUNT: 20.3 % (ref 11.5–14.5)
EST. GFR  (NO RACE VARIABLE): >60 ML/MIN/1.73 M^2
GLUCOSE SERPL-MCNC: 70 MG/DL (ref 70–110)
HCT VFR BLD AUTO: 30.7 % (ref 37–48.5)
HGB BLD-MCNC: 10.2 G/DL (ref 12–16)
IMM GRANULOCYTES # BLD AUTO: 0.01 K/UL (ref 0–0.04)
IMM GRANULOCYTES NFR BLD AUTO: 0.1 % (ref 0–0.5)
LYMPHOCYTES # BLD AUTO: 3.1 K/UL (ref 1–4.8)
LYMPHOCYTES NFR BLD: 45.9 % (ref 18–48)
MAGNESIUM SERPL-MCNC: 2.1 MG/DL (ref 1.6–2.6)
MCH RBC QN AUTO: 24.9 PG (ref 27–31)
MCHC RBC AUTO-ENTMCNC: 33.2 G/DL (ref 32–36)
MCV RBC AUTO: 75 FL (ref 82–98)
MONOCYTES # BLD AUTO: 0.9 K/UL (ref 0.3–1)
MONOCYTES NFR BLD: 12.7 % (ref 4–15)
NEUTROPHILS # BLD AUTO: 2.6 K/UL (ref 1.8–7.7)
NEUTROPHILS NFR BLD: 37.8 % (ref 38–73)
NRBC BLD-RTO: 1 /100 WBC
PHOSPHATE SERPL-MCNC: 3.7 MG/DL (ref 2.7–4.5)
PLATELET # BLD AUTO: 302 K/UL (ref 150–450)
PMV BLD AUTO: 10.4 FL (ref 9.2–12.9)
POTASSIUM SERPL-SCNC: 3.8 MMOL/L (ref 3.5–5.1)
PROT SERPL-MCNC: 6.7 G/DL (ref 6–8.4)
RBC # BLD AUTO: 4.09 M/UL (ref 4–5.4)
SODIUM SERPL-SCNC: 139 MMOL/L (ref 136–145)
VANCOMYCIN TROUGH SERPL-MCNC: 14.7 UG/ML (ref 10–22)
WBC # BLD AUTO: 6.84 K/UL (ref 3.9–12.7)

## 2024-07-05 PROCEDURE — 80156 ASSAY CARBAMAZEPINE TOTAL: CPT

## 2024-07-05 PROCEDURE — 80053 COMPREHEN METABOLIC PANEL: CPT | Performed by: NURSE PRACTITIONER

## 2024-07-05 PROCEDURE — 25000003 PHARM REV CODE 250

## 2024-07-05 PROCEDURE — 25000003 PHARM REV CODE 250: Performed by: HOSPITALIST

## 2024-07-05 PROCEDURE — 80202 ASSAY OF VANCOMYCIN: CPT | Performed by: STUDENT IN AN ORGANIZED HEALTH CARE EDUCATION/TRAINING PROGRAM

## 2024-07-05 PROCEDURE — 25000003 PHARM REV CODE 250: Performed by: INTERNAL MEDICINE

## 2024-07-05 PROCEDURE — 63600175 PHARM REV CODE 636 W HCPCS: Performed by: INTERNAL MEDICINE

## 2024-07-05 PROCEDURE — A4216 STERILE WATER/SALINE, 10 ML: HCPCS | Performed by: STUDENT IN AN ORGANIZED HEALTH CARE EDUCATION/TRAINING PROGRAM

## 2024-07-05 PROCEDURE — 84100 ASSAY OF PHOSPHORUS: CPT | Performed by: NURSE PRACTITIONER

## 2024-07-05 PROCEDURE — 25000003 PHARM REV CODE 250: Performed by: STUDENT IN AN ORGANIZED HEALTH CARE EDUCATION/TRAINING PROGRAM

## 2024-07-05 PROCEDURE — 83735 ASSAY OF MAGNESIUM: CPT

## 2024-07-05 PROCEDURE — 85025 COMPLETE CBC W/AUTO DIFF WBC: CPT | Performed by: NURSE PRACTITIONER

## 2024-07-05 PROCEDURE — 63600175 PHARM REV CODE 636 W HCPCS: Performed by: STUDENT IN AN ORGANIZED HEALTH CARE EDUCATION/TRAINING PROGRAM

## 2024-07-05 PROCEDURE — 21400001 HC TELEMETRY ROOM

## 2024-07-05 PROCEDURE — 36415 COLL VENOUS BLD VENIPUNCTURE: CPT | Performed by: STUDENT IN AN ORGANIZED HEALTH CARE EDUCATION/TRAINING PROGRAM

## 2024-07-05 PROCEDURE — 63600175 PHARM REV CODE 636 W HCPCS

## 2024-07-05 PROCEDURE — 94761 N-INVAS EAR/PLS OXIMETRY MLT: CPT

## 2024-07-05 RX ORDER — DIPHENHYDRAMINE HYDROCHLORIDE 50 MG/ML
25 INJECTION INTRAMUSCULAR; INTRAVENOUS ONCE
Status: COMPLETED | OUTPATIENT
Start: 2024-07-05 | End: 2024-07-05

## 2024-07-05 RX ORDER — HYDROMORPHONE HYDROCHLORIDE 2 MG/ML
3 INJECTION, SOLUTION INTRAMUSCULAR; INTRAVENOUS; SUBCUTANEOUS ONCE
Status: COMPLETED | OUTPATIENT
Start: 2024-07-05 | End: 2024-07-05

## 2024-07-05 RX ADMIN — SENNOSIDES AND DOCUSATE SODIUM 2 TABLET: 50; 8.6 TABLET ORAL at 09:07

## 2024-07-05 RX ADMIN — HYDROMORPHONE HYDROCHLORIDE 2 MG: 1 INJECTION, SOLUTION INTRAMUSCULAR; INTRAVENOUS; SUBCUTANEOUS at 05:07

## 2024-07-05 RX ADMIN — HYDROMORPHONE HYDROCHLORIDE 2 MG: 1 INJECTION, SOLUTION INTRAMUSCULAR; INTRAVENOUS; SUBCUTANEOUS at 01:07

## 2024-07-05 RX ADMIN — PROMETHAZINE HYDROCHLORIDE 25 MG: 12.5 TABLET ORAL at 05:07

## 2024-07-05 RX ADMIN — MUPIROCIN: 20 OINTMENT TOPICAL at 09:07

## 2024-07-05 RX ADMIN — AMLODIPINE BESYLATE 10 MG: 10 TABLET ORAL at 09:07

## 2024-07-05 RX ADMIN — APIXABAN 5 MG: 5 TABLET, FILM COATED ORAL at 09:07

## 2024-07-05 RX ADMIN — DIPHENHYDRAMINE HYDROCHLORIDE 25 MG: 25 CAPSULE ORAL at 01:07

## 2024-07-05 RX ADMIN — POTASSIUM CHLORIDE 20 MEQ: 1500 TABLET, EXTENDED RELEASE ORAL at 09:07

## 2024-07-05 RX ADMIN — PROMETHAZINE HYDROCHLORIDE 25 MG: 12.5 TABLET ORAL at 09:07

## 2024-07-05 RX ADMIN — FLUOXETINE HYDROCHLORIDE 40 MG: 20 CAPSULE ORAL at 09:07

## 2024-07-05 RX ADMIN — LACTULOSE 30 G: 20 SOLUTION ORAL at 09:07

## 2024-07-05 RX ADMIN — CARVEDILOL 25 MG: 25 TABLET, FILM COATED ORAL at 09:07

## 2024-07-05 RX ADMIN — Medication 10 ML: at 05:07

## 2024-07-05 RX ADMIN — Medication 10 ML: at 11:07

## 2024-07-05 RX ADMIN — LACTULOSE 30 G: 20 SOLUTION ORAL at 03:07

## 2024-07-05 RX ADMIN — HYDROMORPHONE HYDROCHLORIDE 3 MG: 2 INJECTION INTRAMUSCULAR; INTRAVENOUS; SUBCUTANEOUS at 09:07

## 2024-07-05 RX ADMIN — Medication 10 ML: at 09:07

## 2024-07-05 RX ADMIN — DIPHENHYDRAMINE HYDROCHLORIDE 25 MG: 50 INJECTION, SOLUTION INTRAMUSCULAR; INTRAVENOUS at 09:07

## 2024-07-05 RX ADMIN — DIPHENHYDRAMINE HYDROCHLORIDE 25 MG: 25 CAPSULE ORAL at 09:07

## 2024-07-05 RX ADMIN — HYDROMORPHONE HYDROCHLORIDE 2 MG: 1 INJECTION, SOLUTION INTRAMUSCULAR; INTRAVENOUS; SUBCUTANEOUS at 09:07

## 2024-07-05 RX ADMIN — VANCOMYCIN HYDROCHLORIDE 1500 MG: 1.5 INJECTION, POWDER, LYOPHILIZED, FOR SOLUTION INTRAVENOUS at 10:07

## 2024-07-05 RX ADMIN — POLYETHYLENE GLYCOL 3350 17 G: 17 POWDER, FOR SOLUTION ORAL at 09:07

## 2024-07-05 NOTE — NURSING
Patient was given 25mg promethazine PO at 2034 and is still c/o nausea. She says zofran doesn't work for her; asking for another dose of promethazine if possible. Notified DOMINIC Catalan MD via secure chat. New orders placed for 12.5mg promethazine IVPB x1 dose.

## 2024-07-05 NOTE — PROGRESS NOTES
[facility] ([address] Medicine  Progress Note    Patient Name: Nazanin Malone  MRN: [id number]  Patient Class: IP- Inpatient   Admission Date: 6/26/2024  Length of Stay: 8 days  Attending Physician: Baltazar Barth MD  Primary Care Provider: Pee Montgomery MD        Subjective:     Principal Problem:Encephalopathy        HPI:  44 y.o. female presenting with chest pain and leg pain for about 3 days.  History of acute chest syndrome secondary to sickle cell anemia.  History of sickle cell beta thalassemia.  Has had multiple acute chest syndrome, pneumonia.  Also history of pulmonary embolism but has been off of her Eliquis for several months due to insurance related issues.  Does not take folic acid or hydroxyurea either.  Has a port in the right side of her chest, history of needing exchange transfusions.  Fairly significant history of acute chest syndrome, rhabdomyolysis resulting in fasciotomy of the right lower extremity, just recently as of last year October 20, 2023.  Recently moved here from [place], drove here 3 days ago.  No new leg swelling or muscle pains.    Overview/Hospital Course:  No notes on file    Interval History: ICU transfer, In sever pain this am, will check CXR, ICU transfer for AMS previously. 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    Review of Systems  Objective:     Vital Signs (Most Recent):  Temp: 98.5 °F (36.9 °C) (07/05/24 0739)  Pulse: 82 (07/05/24 1038)  Resp: 16 (07/05/24 0928)  BP: (!) 160/95 (07/05/24 0739)  SpO2: 98 % (07/05/24 1038) Vital Signs (24h Range):  Temp:  [98.2 °F (36.8 °C)-98.9 °F (37.2 °C)] 98.5 °F (36.9 °C)  Pulse:  [69-82] 82  Resp:  [10-22] 16  SpO2:  [94 %-100 %] 98 %  BP: (119-162)/() 160/95     Weight: 86.2 kg (190 lb 0.6 oz)  Body mass index is 34.76 kg/m².    Intake/Output Summary (Last 24 hours) at 7/5/2024 1039  Last data filed at 7/5/2024 1021  Gross per 24 hour   Intake 247.2 ml    Output 1510 ml   Net -1262.8 ml         Physical Exam  Vitals and nursing note reviewed.   Constitutional:       General: She is not in acute distress.     Appearance: She is ill-appearing.   HENT:      Head: Normocephalic.      Mouth/Throat:      Mouth: Mucous membranes are moist.      Pharynx: Oropharynx is clear.   Eyes:      Pupils: Pupils are equal, round, and reactive to light.   Cardiovascular:      Rate and Rhythm: Normal rate.      Pulses: Normal pulses.   Pulmonary:      Effort: Pulmonary effort is normal. No respiratory distress.      Breath sounds: Normal breath sounds.   Abdominal:      General: Abdomen is flat.      Palpations: Abdomen is soft.   Musculoskeletal:      Left upper arm: Swelling present.      Cervical back: Normal range of motion.      Right lower leg: No edema.      Left lower leg: Edema present.      Comments: Left lower arm swelling   Skin:     General: Skin is warm and dry.      Capillary Refill: Capillary refill takes less than 2 seconds.   Neurological:      General: No focal deficit present.      Mental Status: She is alert. Mental status is at baseline.   Psychiatric:         Mood and Affect: Mood normal.             Significant Labs: All pertinent labs within the past 24 hours have been reviewed.  BMP:   Recent Labs   Lab 07/05/24  0551   GLU 70      K 3.8      CO2 29   BUN 13   CREATININE 1.1   CALCIUM 9.2   MG 2.1       Significant Imaging: I have reviewed all pertinent imaging results/findings within the past 24 hours.    Assessment/Plan:      Hypokalemia  Patient has hypokalemia which is Acute and currently uncontrolled. Most recent potassium levels reviewed-   Lab Results   Component Value Date    K 3.0 (L) 06/26/2024   . Will continue potassium replacement per protocol and recheck repeat levels after replacement completed.     History of pulmonary embolism  CTA today: 1. Examination is essentially nondiagnostic for assessment of the pulmonary arteries beyond  the level of the main right and left pulmonary arteries due to suboptimal contrast bolus timing and respiratory motion.  2. No definite acute large central/saddle-type embolus.  Off of Elequis  Will restart Eliquis at 5mg BID, low threshold to increase to treatment dose.      Intermittent asthma  Stable      Anxiety        Trigeminal neuralgia  Complains of mild pain      Sickle-cell disease with pain  History of Acute Chest Syndrome and Pulmonary Embolisim  IVF for now  Dilaudid 2mg q4 prn  Has not been seen here in three years, will consult hem onc to reestablish care      Iron deficiency anemia  Patient's anemia is currently controlled. Has not received any PRBCs to date. Etiology likely d/t chronic blood loss  Current CBC reviewed-   Lab Results   Component Value Date    HGB 12.1 06/26/2024    HCT 40 06/26/2024     Monitor serial CBC and transfuse if patient becomes hemodynamically unstable, symptomatic or H/H drops below 7/21.      VTE Risk Mitigation (From admission, onward)           Ordered     apixaban tablet 5 mg  2 times daily         06/26/24 1846     IP VTE HIGH RISK PATIENT  Once         06/26/24 1846     Place sequential compression device  Until discontinued         06/26/24 1846                    Discharge Planning   ALYSE: 7/9/2024     Code Status: Full Code   Is the patient medically ready for discharge?:     Reason for patient still in hospital (select all that apply): Patient unstable  Discharge Plan A: Home with family   Discharge Delays: None known at this time              Baltazar Barth MD  Department of Hospital Medicine   [facility] ([place]

## 2024-07-05 NOTE — PLAN OF CARE
Problem: Adult Inpatient Plan of Care  Goal: Plan of Care Review  Outcome: Progressing     Problem: Acute Kidney Injury/Impairment  Goal: Fluid and Electrolyte Balance  Outcome: Progressing  Goal: Effective Renal Function  Outcome: Progressing     Problem: Infection  Goal: Absence of Infection Signs and Symptoms  Outcome: Progressing     Problem: Sickle Cell Disease  Goal: Optimal Pain Control and Function  Outcome: Progressing

## 2024-07-05 NOTE — CONSULTS
Pharmacokinetic Assessment Follow Up: IV Vancomycin    Vancomycin serum concentration assessment(s):    The trough level was drawn correctly and can be used to guide therapy at this time. The measurement is slightly below the desired definitive target range of 15 to 20 mcg/mL.    Vancomycin Regimen Plan:    Anticipate Vancomycin accumulation.  Stable renal function.  Continue Vancomycin 1500 mg IV every 24 hours. EOT: 7/11/24.  Next trough level due 7/7 at 0930 or earlier if clinically indicated.    Drug levels (last 3 results):  Recent Labs   Lab Result Units 07/03/24  0503 07/05/24  0853   Vancomycin, Random ug/mL 12.2  --    Vancomycin-Trough ug/mL  --  14.7       Pharmacy will continue to follow and monitor vancomycin.    Please contact pharmacy at extension [phone] for questions regarding this assessment.    Thank you for the consult,   Dayami Knott, PharmD       Patient brief summary:  Nazanin Malone is a 44 y.o. female initiated on antimicrobial therapy with IV Vancomycin for treatment of bacteremia    Drug Allergies:   Review of patient's allergies indicates:  No Known Allergies    Actual Body Weight:   86.2 kg    Renal Function:   Estimated Creatinine Clearance: 65.1 mL/min (based on SCr of 1.1 mg/dL).,     Dialysis Method (if applicable):  N/A    CBC (last 72 hours):  Recent Labs   Lab Result Units 07/02/24  1546 07/03/24  0503 07/04/24  0540 07/05/24  0551   WBC K/uL 8.05 6.13 7.30 6.84   Hemoglobin g/dL 11.4* 9.8* 9.4* 10.2*   Hematocrit % 34.7* 28.5* 27.1* 30.7*   Platelets K/uL 328 280 252 302   Gran % % 52.2 48.5 40.5 37.8*   Lymph % % 33.2 35.1 45.3 45.9   Mono % % 11.6 13.4 10.8 12.7   Eosinophil % % 2.2 2.3 2.9 3.1   Basophil % % 0.4 0.5 0.4 0.4   Differential Method  Automated Automated Automated Automated       Metabolic Panel (last 72 hours):  Recent Labs   Lab Result Units 07/02/24  1546 07/02/24  1642 07/02/24  1844 07/03/24  0503 07/04/24  0540 07/05/24  0551   Sodium mmol/L 142 141  --  141  138 139   Potassium mmol/L 3.8 4.2  --  3.8 3.7 3.8   Chloride mmol/L 106 105  --  106 103 104   CO2 mmol/L 26 24  --  28 27 29   Glucose mg/dL 89 96  --  98 96 70   BUN mg/dL 10 11  --  10 11 13   Creatinine mg/dL 1.4 1.5*  --  1.0 1.2 1.1   Creatinine, Urine mg/dL  --   --  57.0  --   --   --    Albumin g/dL 3.4* 3.3*  --  2.9* 3.0* 3.1*   Total Bilirubin mg/dL 0.4 0.3  --  0.3 0.3 0.3   Alkaline Phosphatase U/L 82 80  --  71 73 72   AST U/L 30 37  --  31 27 22   ALT U/L 13 14  --  14 15 14   Magnesium mg/dL  --   --   --  2.1 2.0 2.1   Phosphorus mg/dL  --   --   --  3.6 3.4 3.7       Vancomycin Administrations:  vancomycin given in the last 96 hours                     vancomycin 1,500 mg in D5W 250 mL IVPB (Vial-Mate) (mg) 1,500 mg New Bag 07/05/24 1028     1,500 mg New Bag 07/04/24 0851     1,500 mg New Bag 07/03/24 0959    vancomycin 750 mg in D5W 250 mL IVPB (Vial-Mate) (mg) 750 mg New Bag 07/02/24 0554                    Microbiologic Results:  Microbiology Results (last 7 days)       Procedure Component Value Units Date/Time    Blood culture [[id number]] Collected: 06/27/24 1544    Order Status: Completed Specimen: Blood Updated: 07/02/24 1812     Blood Culture, Routine No growth after 5 days.    Narrative:      Collection has been rescheduled by DF2 at 06/27/2024 14:22 Reason:   Patient unavailable.  Eileen BLANCO ext [phone].  Collection has been rescheduled by DF2 at 06/27/2024 14:22 Reason:   Patient unavailable.  Eileen BLANCO ext [phone].    Blood culture [[id number]] Collected: 06/27/24 1532    Order Status: Completed Specimen: Blood Updated: 07/02/24 1812     Blood Culture, Routine No growth after 5 days.    Narrative:      Collection has been rescheduled by DF2 at 06/27/2024 14:22 Reason:   Patient unavailable.  Eileen BLANCO ext [phone].  Collection has been rescheduled by DF2 at 06/27/2024 14:22 Reason:   Patient unavailable.  Eileen BLANCO ext [phone].    Respiratory Infection Panel (PCR), Nasopharyngeal [[id number]] Collected:  06/29/24 1030    Order Status: Completed Specimen: Nasopharyngeal Swab Updated: 06/30/24 0026     Respiratory Infection Panel Source NP Swab     Adenovirus Not Detected     Coronavirus 229E, Common Cold Virus Not Detected     Coronavirus HKU1, Common Cold Virus Not Detected     Coronavirus NL63, Common Cold Virus Not Detected     Coronavirus OC43, Common Cold Virus Not Detected     Comment: The Coronavirus strains detected in this test cause the common cold.  These strains are not the COVID-19 (novel Coronavirus)strain   associated with the respiratory disease outbreak.          SARS-CoV2 (COVID-19) Qualitative PCR Not Detected     Human Metapneumovirus Not Detected     Human Rhinovirus/Enterovirus Not Detected     Influenza A (subtypes H1, H1-2009,H3) Not Detected     Influenza B Not Detected     Parainfluenza Virus 1 Not Detected     Parainfluenza Virus 2 Not Detected     Parainfluenza Virus 3 Not Detected     Parainfluenza Virus 4 Not Detected     Respiratory Syncytial Virus Not Detected     Bordetella Parapertussis (MW7544) Not Detected     Bordetella pertussis (ptxP) Not Detected     Chlamydia pneumoniae Not Detected     Mycoplasma pneumoniae Not Detected    Narrative:      Assay not valid for lower respiratory specimens, alternate  testing required.    Blood culture #2 **CANNOT BE ORDERED STAT** [[id number]]  (Abnormal)  (Susceptibility) Collected: 06/26/24 1346    Order Status: Completed Specimen: Blood from Peripheral, Antecubital, Left Updated: 06/29/24 1041     Blood Culture, Routine Gram stain kimberly bottle: Gram positive cocci in clusters resembling Staph      Results called to and read back by: Eileen Soler LPN 06/27/2024  09:34      Gram stain aer bottle: Gram positive cocci in clusters resembling Staph      Positive results previously called 06/27/2024  11:27      STAPHYLOCOCCUS EPIDERMIDIS    Blood culture #1 **CANNOT BE ORDERED STAT** [[id number]]  (Abnormal)  (Susceptibility) Collected: 06/26/24 1346     Order Status: Completed Specimen: Blood from Peripheral, Antecubital, Right Updated: 06/29/24 1040     Blood Culture, Routine Gram stain aer bottle: Gram positive cocci in clusters resembling Staph      Results called to and read back by: Eileen Soler LPN 06/27/2024  09:32      Gram stain kimberly bottle: Gram positive cocci in clusters resembling Staph      Positive results previously called 06/27/2024  11:26      STAPHYLOCOCCUS EPIDERMIDIS    Respiratory Infection Panel (PCR), Nasopharyngeal [[id number]] Collected: 06/29/24 0802    Order Status: Canceled Specimen: Nasopharyngeal Swab

## 2024-07-05 NOTE — PLAN OF CARE
Problem: Adult Inpatient Plan of Care  Goal: Absence of Hospital-Acquired Illness or Injury  Outcome: Progressing  Intervention: Identify and Manage Fall Risk  Flowsheets (Taken 7/5/2024 1640)  Safety Promotion/Fall Prevention:   assistive device/personal item within reach   diversional activities provided   commode/urinal/bedpan at bedside   Fall Risk reviewed with patient/family   family to remain at bedside   high risk medications identified   in recliner, wheels locked   lighting adjusted   medications reviewed   muscle strengthening facilitated   nonskid shoes/socks when out of bed   pulse ox   room near unit station  Intervention: Prevent Skin Injury  Flowsheets (Taken 7/5/2024 1640)  Body Position: position changed independently  Skin Protection:   hydrocolloids used   incontinence pads utilized   pulse oximeter probe site changed  Device Skin Pressure Protection:   absorbent pad utilized/changed   adhesive use limited   positioning supports utilized  Intervention: Prevent and Manage VTE (Venous Thromboembolism) Risk  Flowsheets (Taken 7/5/2024 1640)  VTE Prevention/Management:   remove, assess skin, and reapply sequential compression device   ambulation promoted   bleeding precations maintained   bleeding risk assessed   bleeding risk factor(s) identified, provider notified   dorsiflexion/plantar flexion performed   fluids promoted   ROM (active) performed  Intervention: Prevent Infection  Flowsheets (Taken 7/5/2024 1640)  Infection Prevention:   cohorting utilized   hand hygiene promoted   single patient room provided   environmental surveillance performed   equipment surfaces disinfected   rest/sleep promoted     Problem: Pneumonia  Goal: Effective Oxygenation and Ventilation  Outcome: Progressing  Intervention: Promote Airway Secretion Clearance  Flowsheets (Taken 7/5/2024 1640)  Breathing Techniques/Airway Clearance: deep/controlled cough encouraged  Cough And Deep Breathing: done independently per  patient  Intervention: Optimize Oxygenation and Ventilation  Flowsheets (Taken 7/5/2024 1640)  Airway/Ventilation Management: airway patency maintained  Head of Bed (HOB) Positioning: HOB at 30-45 degrees     Problem: Fall Injury Risk  Goal: Absence of Fall and Fall-Related Injury  Outcome: Progressing  Intervention: Identify and Manage Contributors  Flowsheets (Taken 7/5/2024 1640)  Self-Care Promotion:   independence encouraged   meal set-up provided   adaptive equipment use encouraged   BADL personal objects within reach   BADL personal routines maintained  Medication Review/Management:   medications reviewed   high-risk medications identified   provider consulted   pharmacy consulted   dosing adjusted  Intervention: Promote Injury-Free Environment  Flowsheets (Taken 7/5/2024 1640)  Safety Promotion/Fall Prevention:   assistive device/personal item within reach   diversional activities provided   commode/urinal/bedpan at bedside   Fall Risk reviewed with patient/family   family to remain at bedside   high risk medications identified   in recliner, wheels locked   lighting adjusted   medications reviewed   muscle strengthening facilitated   nonskid shoes/socks when out of bed   pulse ox   room near unit station     Problem: Acute Kidney Injury/Impairment  Goal: Improved Oral Intake  Outcome: Progressing  Intervention: Promote and Optimize Oral Intake  Flowsheets (Taken 7/5/2024 1640)  Oral Nutrition Promotion:   adaptive equipment use encouraged   nutrition counseling provided   social interaction promoted   physical activity promoted   rest periods promoted  Nutrition Interventions:   referred to dietitian   diet adjusted   food preferences provided   meals from home/family encouraged   meal set-up provided   referred to nutrition assistant/tech   supplemental drinks provided   supplemental foods provided  Goal: Effective Renal Function  Outcome: Progressing  Intervention: Monitor and Support Renal  Function  Flowsheets (Taken 7/5/2024 1640)  Stabilization Measures:   verbal stimulation provided   legs elevated   provider notified  Medication Review/Management:   medications reviewed   high-risk medications identified   provider consulted   pharmacy consulted   dosing adjusted     Problem: Infection  Goal: Absence of Infection Signs and Symptoms  Outcome: Progressing  Intervention: Prevent or Manage Infection  Flowsheets (Taken 7/5/2024 1640)  Fever Reduction/Comfort Measures:   aerosol temperature decreased   fluid intake increased   lightweight clothing   lightweight bedding  Infection Management: aseptic technique maintained     Problem: Skin Injury Risk Increased  Goal: Skin Health and Integrity  Outcome: Progressing  Intervention: Optimize Skin Protection  Flowsheets (Taken 7/5/2024 1640)  Pressure Reduction Techniques:   frequent weight shift encouraged   heels elevated off bed   positioned off wounds   pressure points protected   weight shift assistance provided  Pressure Reduction Devices:   foam padding utilized   positioning supports utilized  Skin Protection:   hydrocolloids used   incontinence pads utilized   pulse oximeter probe site changed  Activity Management: Ambulated to bathroom - L4  Head of Bed (HOB) Positioning: HOB at 30-45 degrees  Intervention: Promote and Optimize Oral Intake  Flowsheets (Taken 7/5/2024 1640)  Oral Nutrition Promotion:   adaptive equipment use encouraged   nutrition counseling provided   social interaction promoted   physical activity promoted   rest periods promoted  Nutrition Interventions:   referred to dietitian   diet adjusted   food preferences provided   meals from home/family encouraged   meal set-up provided   referred to nutrition assistant/tech   supplemental drinks provided   supplemental foods provided     Problem: Sickle Cell Disease  Goal: Optimal Pain Control and Function  Outcome: Progressing  Intervention: Manage Acute Pain  Flowsheets (Taken 7/5/2024  1640)  Sleep/Rest Enhancement:   awakenings minimized   consistent schedule promoted   family presence promoted   reading promoted   room darkened   therapeutic touch utilized   noise level reduced   natural light exposure provided   music provided   regular sleep/rest pattern promoted   relaxation techniques promoted  Diversional Activities:   movies   music   play   video games   television   tablet   smartphone   reading  Pain Management Interventions:   around-the-clock dosing utilized   awakened for pain meds per patient request   breathing exercises utilized   care clustered   cold applied   diversional activity provided   heat applied   medication offered   pain management plan reviewed with patient/caregiver   pillow support provided   position adjusted   premedicated for activity   quiet environment facilitated   relaxation techniques promoted   warm blanket provided  Intervention: Acknowledge Underlying Chronic Pain  Flowsheets (Taken 7/5/2024 1640)  Medication Review/Management:   medications reviewed   high-risk medications identified   provider consulted   pharmacy consulted   dosing adjusted     Problem: Adult Inpatient Plan of Care  Goal: Optimal Comfort and Wellbeing  Outcome: Adequate for Care Transition  Intervention: Monitor Pain and Promote Comfort  Flowsheets (Taken 7/5/2024 1640)  Pain Management Interventions:   around-the-clock dosing utilized   awakened for pain meds per patient request   breathing exercises utilized   care clustered   cold applied   diversional activity provided   heat applied   medication offered   pain management plan reviewed with patient/caregiver   pillow support provided   position adjusted   premedicated for activity   quiet environment facilitated   relaxation techniques promoted   warm blanket provided  Intervention: Provide Person-Centered Care  Flowsheets (Taken 7/5/2024 1640)  Trust Relationship/Rapport:   care explained   questions answered   choices provided    questions encouraged   emotional support provided   reassurance provided   empathic listening provided   thoughts/feelings acknowledged

## 2024-07-05 NOTE — SUBJECTIVE & OBJECTIVE
Interval History: ICU transfer, In sever pain this am, will check CXR, ICU transfer for AMS previously. 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    Review of Systems  Objective:     Vital Signs (Most Recent):  Temp: 98.5 °F (36.9 °C) (07/05/24 0739)  Pulse: 82 (07/05/24 1038)  Resp: 16 (07/05/24 0928)  BP: (!) 160/95 (07/05/24 0739)  SpO2: 98 % (07/05/24 1038) Vital Signs (24h Range):  Temp:  [98.2 °F (36.8 °C)-98.9 °F (37.2 °C)] 98.5 °F (36.9 °C)  Pulse:  [69-82] 82  Resp:  [10-22] 16  SpO2:  [94 %-100 %] 98 %  BP: (119-162)/() 160/95     Weight: 86.2 kg (190 lb 0.6 oz)  Body mass index is 34.76 kg/m².    Intake/Output Summary (Last 24 hours) at 7/5/2024 1039  Last data filed at 7/5/2024 1021  Gross per 24 hour   Intake 247.2 ml   Output 1510 ml   Net -1262.8 ml         Physical Exam  Vitals and nursing note reviewed.   Constitutional:       General: She is not in acute distress.     Appearance: She is ill-appearing.   HENT:      Head: Normocephalic.      Mouth/Throat:      Mouth: Mucous membranes are moist.      Pharynx: Oropharynx is clear.   Eyes:      Pupils: Pupils are equal, round, and reactive to light.   Cardiovascular:      Rate and Rhythm: Normal rate.      Pulses: Normal pulses.   Pulmonary:      Effort: Pulmonary effort is normal. No respiratory distress.      Breath sounds: Normal breath sounds.   Abdominal:      General: Abdomen is flat.      Palpations: Abdomen is soft.   Musculoskeletal:      Left upper arm: Swelling present.      Cervical back: Normal range of motion.      Right lower leg: No edema.      Left lower leg: Edema present.      Comments: Left lower arm swelling   Skin:     General: Skin is warm and dry.      Capillary Refill: Capillary refill takes less than 2 seconds.   Neurological:      General: No focal deficit present.      Mental Status: She is alert. Mental status is at baseline.   Psychiatric:         Mood and Affect: Mood normal.              Significant Labs: All pertinent labs within the past 24 hours have been reviewed.  BMP:   Recent Labs   Lab 07/05/24  0551   GLU 70      K 3.8      CO2 29   BUN 13   CREATININE 1.1   CALCIUM 9.2   MG 2.1       Significant Imaging: I have reviewed all pertinent imaging results/findings within the past 24 hours.

## 2024-07-06 LAB
ALBUMIN SERPL BCP-MCNC: 3 G/DL (ref 3.5–5.2)
ALP SERPL-CCNC: 70 U/L (ref 55–135)
ALT SERPL W/O P-5'-P-CCNC: 13 U/L (ref 10–44)
ANION GAP SERPL CALC-SCNC: 6 MMOL/L (ref 8–16)
AST SERPL-CCNC: 18 U/L (ref 10–40)
BASOPHILS # BLD AUTO: 0.03 K/UL (ref 0–0.2)
BASOPHILS NFR BLD: 0.4 % (ref 0–1.9)
BILIRUB SERPL-MCNC: 0.3 MG/DL (ref 0.1–1)
BUN SERPL-MCNC: 12 MG/DL (ref 6–20)
CALCIUM SERPL-MCNC: 9.3 MG/DL (ref 8.7–10.5)
CARBAMAZEPINE SERPL-MCNC: 3 UG/ML (ref 4–12)
CHLORIDE SERPL-SCNC: 107 MMOL/L (ref 95–110)
CO2 SERPL-SCNC: 27 MMOL/L (ref 23–29)
CREAT SERPL-MCNC: 1.1 MG/DL (ref 0.5–1.4)
DIFFERENTIAL METHOD BLD: ABNORMAL
EOSINOPHIL # BLD AUTO: 0.2 K/UL (ref 0–0.5)
EOSINOPHIL NFR BLD: 3.6 % (ref 0–8)
ERYTHROCYTE [DISTWIDTH] IN BLOOD BY AUTOMATED COUNT: 20 % (ref 11.5–14.5)
EST. GFR  (NO RACE VARIABLE): >60 ML/MIN/1.73 M^2
GLUCOSE SERPL-MCNC: 98 MG/DL (ref 70–110)
HCT VFR BLD AUTO: 29.1 % (ref 37–48.5)
HGB BLD-MCNC: 9.8 G/DL (ref 12–16)
IMM GRANULOCYTES # BLD AUTO: 0.01 K/UL (ref 0–0.04)
IMM GRANULOCYTES NFR BLD AUTO: 0.1 % (ref 0–0.5)
LYMPHOCYTES # BLD AUTO: 2.7 K/UL (ref 1–4.8)
LYMPHOCYTES NFR BLD: 39.7 % (ref 18–48)
MAGNESIUM SERPL-MCNC: 2 MG/DL (ref 1.6–2.6)
MCH RBC QN AUTO: 25.5 PG (ref 27–31)
MCHC RBC AUTO-ENTMCNC: 33.7 G/DL (ref 32–36)
MCV RBC AUTO: 76 FL (ref 82–98)
MONOCYTES # BLD AUTO: 0.9 K/UL (ref 0.3–1)
MONOCYTES NFR BLD: 13 % (ref 4–15)
NEUTROPHILS # BLD AUTO: 2.9 K/UL (ref 1.8–7.7)
NEUTROPHILS NFR BLD: 43.2 % (ref 38–73)
NRBC BLD-RTO: 0 /100 WBC
PHOSPHATE SERPL-MCNC: 3.6 MG/DL (ref 2.7–4.5)
PLATELET # BLD AUTO: 282 K/UL (ref 150–450)
PMV BLD AUTO: 10 FL (ref 9.2–12.9)
POTASSIUM SERPL-SCNC: 3.8 MMOL/L (ref 3.5–5.1)
PROT SERPL-MCNC: 6.4 G/DL (ref 6–8.4)
RBC # BLD AUTO: 3.85 M/UL (ref 4–5.4)
SODIUM SERPL-SCNC: 140 MMOL/L (ref 136–145)
WBC # BLD AUTO: 6.7 K/UL (ref 3.9–12.7)

## 2024-07-06 PROCEDURE — 84100 ASSAY OF PHOSPHORUS: CPT | Performed by: NURSE PRACTITIONER

## 2024-07-06 PROCEDURE — 25000003 PHARM REV CODE 250: Performed by: INTERNAL MEDICINE

## 2024-07-06 PROCEDURE — A4216 STERILE WATER/SALINE, 10 ML: HCPCS | Performed by: STUDENT IN AN ORGANIZED HEALTH CARE EDUCATION/TRAINING PROGRAM

## 2024-07-06 PROCEDURE — 25000003 PHARM REV CODE 250

## 2024-07-06 PROCEDURE — 85025 COMPLETE CBC W/AUTO DIFF WBC: CPT | Performed by: NURSE PRACTITIONER

## 2024-07-06 PROCEDURE — 63600175 PHARM REV CODE 636 W HCPCS

## 2024-07-06 PROCEDURE — 80156 ASSAY CARBAMAZEPINE TOTAL: CPT

## 2024-07-06 PROCEDURE — 21400001 HC TELEMETRY ROOM

## 2024-07-06 PROCEDURE — 80053 COMPREHEN METABOLIC PANEL: CPT | Performed by: NURSE PRACTITIONER

## 2024-07-06 PROCEDURE — 25000003 PHARM REV CODE 250: Performed by: STUDENT IN AN ORGANIZED HEALTH CARE EDUCATION/TRAINING PROGRAM

## 2024-07-06 PROCEDURE — 63600175 PHARM REV CODE 636 W HCPCS: Performed by: STUDENT IN AN ORGANIZED HEALTH CARE EDUCATION/TRAINING PROGRAM

## 2024-07-06 PROCEDURE — 83735 ASSAY OF MAGNESIUM: CPT

## 2024-07-06 PROCEDURE — 25000003 PHARM REV CODE 250: Performed by: HOSPITALIST

## 2024-07-06 RX ORDER — TIZANIDINE 4 MG/1
4 TABLET ORAL EVERY 8 HOURS PRN
Status: DISCONTINUED | OUTPATIENT
Start: 2024-07-06 | End: 2024-07-12 | Stop reason: HOSPADM

## 2024-07-06 RX ORDER — TIZANIDINE 4 MG/1
4 TABLET ORAL ONCE
Status: COMPLETED | OUTPATIENT
Start: 2024-07-06 | End: 2024-07-06

## 2024-07-06 RX ADMIN — CARVEDILOL 25 MG: 25 TABLET, FILM COATED ORAL at 09:07

## 2024-07-06 RX ADMIN — PROMETHAZINE HYDROCHLORIDE 25 MG: 12.5 TABLET ORAL at 11:07

## 2024-07-06 RX ADMIN — MUPIROCIN: 20 OINTMENT TOPICAL at 09:07

## 2024-07-06 RX ADMIN — SENNOSIDES AND DOCUSATE SODIUM 2 TABLET: 50; 8.6 TABLET ORAL at 09:07

## 2024-07-06 RX ADMIN — AMLODIPINE BESYLATE 10 MG: 10 TABLET ORAL at 09:07

## 2024-07-06 RX ADMIN — APIXABAN 5 MG: 5 TABLET, FILM COATED ORAL at 09:07

## 2024-07-06 RX ADMIN — Medication 10 ML: at 06:07

## 2024-07-06 RX ADMIN — Medication 10 ML: at 11:07

## 2024-07-06 RX ADMIN — LACTULOSE 30 G: 20 SOLUTION ORAL at 03:07

## 2024-07-06 RX ADMIN — HYDROMORPHONE HYDROCHLORIDE 2 MG: 1 INJECTION, SOLUTION INTRAMUSCULAR; INTRAVENOUS; SUBCUTANEOUS at 01:07

## 2024-07-06 RX ADMIN — TIZANIDINE 4 MG: 4 TABLET ORAL at 01:07

## 2024-07-06 RX ADMIN — DIPHENHYDRAMINE HYDROCHLORIDE 25 MG: 25 CAPSULE ORAL at 09:07

## 2024-07-06 RX ADMIN — PROMETHAZINE HYDROCHLORIDE 25 MG: 12.5 TABLET ORAL at 10:07

## 2024-07-06 RX ADMIN — VANCOMYCIN HYDROCHLORIDE 1500 MG: 1.5 INJECTION, POWDER, LYOPHILIZED, FOR SOLUTION INTRAVENOUS at 09:07

## 2024-07-06 RX ADMIN — PROMETHAZINE HYDROCHLORIDE 25 MG: 12.5 TABLET ORAL at 01:07

## 2024-07-06 RX ADMIN — TIZANIDINE 4 MG: 4 TABLET ORAL at 09:07

## 2024-07-06 RX ADMIN — POLYETHYLENE GLYCOL 3350 17 G: 17 POWDER, FOR SOLUTION ORAL at 03:07

## 2024-07-06 RX ADMIN — HYDROMORPHONE HYDROCHLORIDE 2 MG: 1 INJECTION, SOLUTION INTRAMUSCULAR; INTRAVENOUS; SUBCUTANEOUS at 06:07

## 2024-07-06 RX ADMIN — POTASSIUM CHLORIDE 20 MEQ: 1500 TABLET, EXTENDED RELEASE ORAL at 09:07

## 2024-07-06 RX ADMIN — FLUOXETINE HYDROCHLORIDE 40 MG: 20 CAPSULE ORAL at 09:07

## 2024-07-06 RX ADMIN — HYDROMORPHONE HYDROCHLORIDE 2 MG: 1 INJECTION, SOLUTION INTRAMUSCULAR; INTRAVENOUS; SUBCUTANEOUS at 10:07

## 2024-07-06 RX ADMIN — HYDROMORPHONE HYDROCHLORIDE 2 MG: 1 INJECTION, SOLUTION INTRAMUSCULAR; INTRAVENOUS; SUBCUTANEOUS at 09:07

## 2024-07-06 NOTE — NURSING
Patient inquiring about tizanidine. States she normally takes it and would like it ordered. Notified DOMINIC Fernandez MD via secure chat. New order placed for tizanidine 4mg PO x1 dose.

## 2024-07-06 NOTE — SUBJECTIVE & OBJECTIVE
Interval History: Resting this am, will revaluate later, CXR with no changes.    Review of Systems  Objective:     Vital Signs (Most Recent):  Temp: 97.8 °F (36.6 °C) (07/06/24 0738)  Pulse: 70 (07/06/24 0738)  Resp: 18 (07/06/24 0409)  BP: (!) 138/93 (07/06/24 0738)  SpO2: 97 % (07/06/24 0738) Vital Signs (24h Range):  Temp:  [97.8 °F (36.6 °C)-98.9 °F (37.2 °C)] 97.8 °F (36.6 °C)  Pulse:  [70-82] 70  Resp:  [16-18] 18  SpO2:  [96 %-98 %] 97 %  BP: (119-159)/(78-93) 138/93     Weight: 86.2 kg (190 lb 0.6 oz)  Body mass index is 34.76 kg/m².    Intake/Output Summary (Last 24 hours) at 7/6/2024 0856  Last data filed at 7/6/2024 0208  Gross per 24 hour   Intake --   Output 2200 ml   Net -2200 ml         Physical Exam  Vitals and nursing note reviewed.   Constitutional:       General: She is not in acute distress.     Appearance: She is ill-appearing.   HENT:      Head: Normocephalic.      Mouth/Throat:      Mouth: Mucous membranes are moist.      Pharynx: Oropharynx is clear.   Eyes:      Pupils: Pupils are equal, round, and reactive to light.   Cardiovascular:      Rate and Rhythm: Normal rate.      Pulses: Normal pulses.   Pulmonary:      Effort: Pulmonary effort is normal. No respiratory distress.      Breath sounds: Normal breath sounds.   Abdominal:      General: Abdomen is flat.      Palpations: Abdomen is soft.   Musculoskeletal:      Left upper arm: Swelling present.      Cervical back: Normal range of motion.      Right lower leg: No edema.      Left lower leg: Edema present.      Comments: Left lower arm swelling   Skin:     General: Skin is warm and dry.      Capillary Refill: Capillary refill takes less than 2 seconds.   Neurological:      General: No focal deficit present.      Mental Status: She is alert. Mental status is at baseline.   Psychiatric:         Mood and Affect: Mood normal.             Significant Labs: All pertinent labs within the past 24 hours have been reviewed.  CBC:   Recent Labs   Lab  07/05/24  0551 07/06/24  0525   WBC 6.84 6.70   HGB 10.2* 9.8*   HCT 30.7* 29.1*    282       Significant Imaging: I have reviewed all pertinent imaging results/findings within the past 24 hours.

## 2024-07-06 NOTE — PROGRESS NOTES
[facility] ([address] Medicine  Progress Note    Patient Name: Nazanin Malone  MRN: [id number]  Patient Class: IP- Inpatient   Admission Date: 6/26/2024  Length of Stay: 9 days  Attending Physician: Baltazar Barth MD  Primary Care Provider: Pee Montgomery MD        Subjective:     Principal Problem:Encephalopathy        HPI:  44 y.o. female presenting with chest pain and leg pain for about 3 days.  History of acute chest syndrome secondary to sickle cell anemia.  History of sickle cell beta thalassemia.  Has had multiple acute chest syndrome, pneumonia.  Also history of pulmonary embolism but has been off of her Eliquis for several months due to insurance related issues.  Does not take folic acid or hydroxyurea either.  Has a port in the right side of her chest, history of needing exchange transfusions.  Fairly significant history of acute chest syndrome, rhabdomyolysis resulting in fasciotomy of the right lower extremity, just recently as of last year October 20, 2023.  Recently moved here from [place], drove here 3 days ago.  No new leg swelling or muscle pains.    Overview/Hospital Course:  No notes on file    Interval History: Resting this am, will revaluate later, CXR with no changes.    Review of Systems  Objective:     Vital Signs (Most Recent):  Temp: 97.8 °F (36.6 °C) (07/06/24 0738)  Pulse: 70 (07/06/24 0738)  Resp: 18 (07/06/24 0409)  BP: (!) 138/93 (07/06/24 0738)  SpO2: 97 % (07/06/24 0738) Vital Signs (24h Range):  Temp:  [97.8 °F (36.6 °C)-98.9 °F (37.2 °C)] 97.8 °F (36.6 °C)  Pulse:  [70-82] 70  Resp:  [16-18] 18  SpO2:  [96 %-98 %] 97 %  BP: (119-159)/(78-93) 138/93     Weight: 86.2 kg (190 lb 0.6 oz)  Body mass index is 34.76 kg/m².    Intake/Output Summary (Last 24 hours) at 7/6/2024 0856  Last data filed at 7/6/2024 0208  Gross per 24 hour   Intake --   Output 2200 ml   Net -2200 ml         Physical Exam  Vitals and nursing note reviewed.   Constitutional:        General: She is not in acute distress.     Appearance: She is ill-appearing.   HENT:      Head: Normocephalic.      Mouth/Throat:      Mouth: Mucous membranes are moist.      Pharynx: Oropharynx is clear.   Eyes:      Pupils: Pupils are equal, round, and reactive to light.   Cardiovascular:      Rate and Rhythm: Normal rate.      Pulses: Normal pulses.   Pulmonary:      Effort: Pulmonary effort is normal. No respiratory distress.      Breath sounds: Normal breath sounds.   Abdominal:      General: Abdomen is flat.      Palpations: Abdomen is soft.   Musculoskeletal:      Left upper arm: Swelling present.      Cervical back: Normal range of motion.      Right lower leg: No edema.      Left lower leg: Edema present.      Comments: Left lower arm swelling   Skin:     General: Skin is warm and dry.      Capillary Refill: Capillary refill takes less than 2 seconds.   Neurological:      General: No focal deficit present.      Mental Status: She is alert. Mental status is at baseline.   Psychiatric:         Mood and Affect: Mood normal.             Significant Labs: All pertinent labs within the past 24 hours have been reviewed.  CBC:   Recent Labs   Lab 07/05/24  0551 07/06/24  0525   WBC 6.84 6.70   HGB 10.2* 9.8*   HCT 30.7* 29.1*    282       Significant Imaging: I have reviewed all pertinent imaging results/findings within the past 24 hours.    Assessment/Plan:      Hypokalemia  Patient has hypokalemia which is Acute and currently uncontrolled. Most recent potassium levels reviewed-   Lab Results   Component Value Date    K 3.0 (L) 06/26/2024   . Will continue potassium replacement per protocol and recheck repeat levels after replacement completed.     History of pulmonary embolism  CTA today: 1. Examination is essentially nondiagnostic for assessment of the pulmonary arteries beyond the level of the main right and left pulmonary arteries due to suboptimal contrast bolus timing and respiratory motion.  2. No  definite acute large central/saddle-type embolus.  Off of Elequis  Will restart Eliquis at 5mg BID, low threshold to increase to treatment dose.      Intermittent asthma  Stable      Anxiety        Trigeminal neuralgia  Complains of mild pain      Sickle-cell disease with pain  History of Acute Chest Syndrome and Pulmonary Embolisim  IVF for now  Dilaudid 2mg q4 prn  Has not been seen here in three years, will consult hem onc to reestablish care      Iron deficiency anemia  Patient's anemia is currently controlled. Has not received any PRBCs to date. Etiology likely d/t chronic blood loss  Current CBC reviewed-   Lab Results   Component Value Date    HGB 12.1 06/26/2024    HCT 40 06/26/2024     Monitor serial CBC and transfuse if patient becomes hemodynamically unstable, symptomatic or H/H drops below 7/21.      VTE Risk Mitigation (From admission, onward)           Ordered     apixaban tablet 5 mg  2 times daily         06/26/24 1846     IP VTE HIGH RISK PATIENT  Once         06/26/24 1846     Place sequential compression device  Until discontinued         06/26/24 1846                    Discharge Planning   ALYSE: 7/9/2024     Code Status: Full Code   Is the patient medically ready for discharge?:     Reason for patient still in hospital (select all that apply): Patient unstable  Discharge Plan A: Home with family   Discharge Delays: None known at this time              Baltazar Barth MD  Department of Hospital Medicine   [facility] ([place]

## 2024-07-06 NOTE — PLAN OF CARE
Problem: Acute Kidney Injury/Impairment  Goal: Fluid and Electrolyte Balance  Outcome: Progressing     Problem: Sickle Cell Disease  Goal: Optimal Pain Control and Function  Outcome: Progressing   Pain meds given accordingly. Iv abx given. Bed locked and in lowest position. Call light in reach.

## 2024-07-07 LAB
ALBUMIN SERPL BCP-MCNC: 3.1 G/DL (ref 3.5–5.2)
ALP SERPL-CCNC: 72 U/L (ref 55–135)
ALT SERPL W/O P-5'-P-CCNC: 14 U/L (ref 10–44)
ANION GAP SERPL CALC-SCNC: 7 MMOL/L (ref 8–16)
AST SERPL-CCNC: 22 U/L (ref 10–40)
BASOPHILS # BLD AUTO: 0.03 K/UL (ref 0–0.2)
BASOPHILS NFR BLD: 0.5 % (ref 0–1.9)
BILIRUB SERPL-MCNC: 0.3 MG/DL (ref 0.1–1)
BUN SERPL-MCNC: 14 MG/DL (ref 6–20)
CALCIUM SERPL-MCNC: 9.5 MG/DL (ref 8.7–10.5)
CARBAMAZEPINE SERPL-MCNC: 2 UG/ML (ref 4–12)
CHLORIDE SERPL-SCNC: 108 MMOL/L (ref 95–110)
CO2 SERPL-SCNC: 24 MMOL/L (ref 23–29)
CREAT SERPL-MCNC: 1.3 MG/DL (ref 0.5–1.4)
DIFFERENTIAL METHOD BLD: ABNORMAL
EOSINOPHIL # BLD AUTO: 0.2 K/UL (ref 0–0.5)
EOSINOPHIL NFR BLD: 3.8 % (ref 0–8)
ERYTHROCYTE [DISTWIDTH] IN BLOOD BY AUTOMATED COUNT: 20.5 % (ref 11.5–14.5)
EST. GFR  (NO RACE VARIABLE): 51.4 ML/MIN/1.73 M^2
GLUCOSE SERPL-MCNC: 72 MG/DL (ref 70–110)
HCT VFR BLD AUTO: 29.6 % (ref 37–48.5)
HGB BLD-MCNC: 9.9 G/DL (ref 12–16)
IMM GRANULOCYTES # BLD AUTO: 0.01 K/UL (ref 0–0.04)
IMM GRANULOCYTES NFR BLD AUTO: 0.2 % (ref 0–0.5)
LYMPHOCYTES # BLD AUTO: 2.5 K/UL (ref 1–4.8)
LYMPHOCYTES NFR BLD: 39.9 % (ref 18–48)
MAGNESIUM SERPL-MCNC: 2.1 MG/DL (ref 1.6–2.6)
MCH RBC QN AUTO: 25.5 PG (ref 27–31)
MCHC RBC AUTO-ENTMCNC: 33.4 G/DL (ref 32–36)
MCV RBC AUTO: 76 FL (ref 82–98)
MONOCYTES # BLD AUTO: 1 K/UL (ref 0.3–1)
MONOCYTES NFR BLD: 15.2 % (ref 4–15)
NEUTROPHILS # BLD AUTO: 2.5 K/UL (ref 1.8–7.7)
NEUTROPHILS NFR BLD: 40.4 % (ref 38–73)
NRBC BLD-RTO: 1 /100 WBC
PHOSPHATE SERPL-MCNC: 4 MG/DL (ref 2.7–4.5)
PLATELET # BLD AUTO: 268 K/UL (ref 150–450)
PMV BLD AUTO: 10.6 FL (ref 9.2–12.9)
POTASSIUM SERPL-SCNC: 3.8 MMOL/L (ref 3.5–5.1)
PROT SERPL-MCNC: 6.6 G/DL (ref 6–8.4)
RBC # BLD AUTO: 3.88 M/UL (ref 4–5.4)
SODIUM SERPL-SCNC: 139 MMOL/L (ref 136–145)
VANCOMYCIN TROUGH SERPL-MCNC: 16.7 UG/ML (ref 10–22)
WBC # BLD AUTO: 6.24 K/UL (ref 3.9–12.7)

## 2024-07-07 PROCEDURE — 80202 ASSAY OF VANCOMYCIN: CPT | Performed by: INTERNAL MEDICINE

## 2024-07-07 PROCEDURE — 21400001 HC TELEMETRY ROOM

## 2024-07-07 PROCEDURE — 63600175 PHARM REV CODE 636 W HCPCS: Performed by: INTERNAL MEDICINE

## 2024-07-07 PROCEDURE — 25000003 PHARM REV CODE 250: Performed by: HOSPITALIST

## 2024-07-07 PROCEDURE — 84100 ASSAY OF PHOSPHORUS: CPT | Performed by: NURSE PRACTITIONER

## 2024-07-07 PROCEDURE — 63600175 PHARM REV CODE 636 W HCPCS

## 2024-07-07 PROCEDURE — 80053 COMPREHEN METABOLIC PANEL: CPT | Performed by: NURSE PRACTITIONER

## 2024-07-07 PROCEDURE — 25000003 PHARM REV CODE 250: Performed by: STUDENT IN AN ORGANIZED HEALTH CARE EDUCATION/TRAINING PROGRAM

## 2024-07-07 PROCEDURE — A4216 STERILE WATER/SALINE, 10 ML: HCPCS | Performed by: STUDENT IN AN ORGANIZED HEALTH CARE EDUCATION/TRAINING PROGRAM

## 2024-07-07 PROCEDURE — 94761 N-INVAS EAR/PLS OXIMETRY MLT: CPT

## 2024-07-07 PROCEDURE — 63600175 PHARM REV CODE 636 W HCPCS: Performed by: STUDENT IN AN ORGANIZED HEALTH CARE EDUCATION/TRAINING PROGRAM

## 2024-07-07 PROCEDURE — 25000003 PHARM REV CODE 250

## 2024-07-07 PROCEDURE — 85025 COMPLETE CBC W/AUTO DIFF WBC: CPT | Performed by: NURSE PRACTITIONER

## 2024-07-07 PROCEDURE — 25000003 PHARM REV CODE 250: Performed by: INTERNAL MEDICINE

## 2024-07-07 PROCEDURE — 83735 ASSAY OF MAGNESIUM: CPT

## 2024-07-07 PROCEDURE — 80156 ASSAY CARBAMAZEPINE TOTAL: CPT

## 2024-07-07 RX ORDER — HYDROMORPHONE HYDROCHLORIDE 1 MG/ML
1 INJECTION, SOLUTION INTRAMUSCULAR; INTRAVENOUS; SUBCUTANEOUS ONCE
Status: COMPLETED | OUTPATIENT
Start: 2024-07-07 | End: 2024-07-07

## 2024-07-07 RX ADMIN — HYDROMORPHONE HYDROCHLORIDE 2 MG: 1 INJECTION, SOLUTION INTRAMUSCULAR; INTRAVENOUS; SUBCUTANEOUS at 03:07

## 2024-07-07 RX ADMIN — AMLODIPINE BESYLATE 10 MG: 10 TABLET ORAL at 08:07

## 2024-07-07 RX ADMIN — LACTULOSE 30 G: 20 SOLUTION ORAL at 08:07

## 2024-07-07 RX ADMIN — HYDROMORPHONE HYDROCHLORIDE 1 MG: 1 INJECTION, SOLUTION INTRAMUSCULAR; INTRAVENOUS; SUBCUTANEOUS at 11:07

## 2024-07-07 RX ADMIN — POLYETHYLENE GLYCOL 3350 17 G: 17 POWDER, FOR SOLUTION ORAL at 08:07

## 2024-07-07 RX ADMIN — DIPHENHYDRAMINE HYDROCHLORIDE 25 MG: 25 CAPSULE ORAL at 08:07

## 2024-07-07 RX ADMIN — PROMETHAZINE HYDROCHLORIDE 25 MG: 12.5 TABLET ORAL at 03:07

## 2024-07-07 RX ADMIN — POTASSIUM CHLORIDE 20 MEQ: 1500 TABLET, EXTENDED RELEASE ORAL at 08:07

## 2024-07-07 RX ADMIN — SENNOSIDES AND DOCUSATE SODIUM 2 TABLET: 50; 8.6 TABLET ORAL at 08:07

## 2024-07-07 RX ADMIN — PROMETHAZINE HYDROCHLORIDE 25 MG: 12.5 TABLET ORAL at 11:07

## 2024-07-07 RX ADMIN — VANCOMYCIN HYDROCHLORIDE 1500 MG: 1.5 INJECTION, POWDER, LYOPHILIZED, FOR SOLUTION INTRAVENOUS at 11:07

## 2024-07-07 RX ADMIN — Medication 10 ML: at 12:07

## 2024-07-07 RX ADMIN — HYDROMORPHONE HYDROCHLORIDE 2 MG: 1 INJECTION, SOLUTION INTRAMUSCULAR; INTRAVENOUS; SUBCUTANEOUS at 08:07

## 2024-07-07 RX ADMIN — HYDROMORPHONE HYDROCHLORIDE 2 MG: 1 INJECTION, SOLUTION INTRAMUSCULAR; INTRAVENOUS; SUBCUTANEOUS at 11:07

## 2024-07-07 RX ADMIN — TIZANIDINE 4 MG: 4 TABLET ORAL at 03:07

## 2024-07-07 RX ADMIN — CARVEDILOL 25 MG: 25 TABLET, FILM COATED ORAL at 08:07

## 2024-07-07 RX ADMIN — Medication 10 ML: at 01:07

## 2024-07-07 RX ADMIN — FLUOXETINE HYDROCHLORIDE 40 MG: 20 CAPSULE ORAL at 08:07

## 2024-07-07 RX ADMIN — DIPHENHYDRAMINE HYDROCHLORIDE 25 MG: 25 CAPSULE ORAL at 03:07

## 2024-07-07 RX ADMIN — TIZANIDINE 4 MG: 4 TABLET ORAL at 11:07

## 2024-07-07 RX ADMIN — APIXABAN 5 MG: 5 TABLET, FILM COATED ORAL at 08:07

## 2024-07-07 RX ADMIN — PROMETHAZINE HYDROCHLORIDE 25 MG: 12.5 TABLET ORAL at 08:07

## 2024-07-07 RX ADMIN — MUPIROCIN: 20 OINTMENT TOPICAL at 08:07

## 2024-07-07 RX ADMIN — Medication 10 ML: at 05:07

## 2024-07-07 RX ADMIN — Medication 10 ML: at 11:07

## 2024-07-07 RX ADMIN — DIPHENHYDRAMINE HYDROCHLORIDE 25 MG: 25 CAPSULE ORAL at 11:07

## 2024-07-07 RX ADMIN — TIZANIDINE 4 MG: 4 TABLET ORAL at 08:07

## 2024-07-07 NOTE — PLAN OF CARE
Problem: Adult Inpatient Plan of Care  Goal: Absence of Hospital-Acquired Illness or Injury  Outcome: Progressing  Intervention: Identify and Manage Fall Risk  Flowsheets (Taken 7/7/2024 1815)  Safety Promotion/Fall Prevention:   assistive device/personal item within reach   commode/urinal/bedpan at bedside   diversional activities provided   Fall Risk reviewed with patient/family   in recliner, wheels locked   lighting adjusted   medications reviewed   high risk medications identified   muscle strengthening facilitated   nonskid shoes/socks when out of bed   pulse ox   room near unit station  Intervention: Prevent Skin Injury  Flowsheets (Taken 7/7/2024 1815)  Body Position: position changed independently  Skin Protection:   hydrocolloids used   incontinence pads utilized   pulse oximeter probe site changed  Device Skin Pressure Protection:   absorbent pad utilized/changed   adhesive use limited   positioning supports utilized   pressure points protected  Intervention: Prevent and Manage VTE (Venous Thromboembolism) Risk  Flowsheets (Taken 7/7/2024 1815)  VTE Prevention/Management:   remove, assess skin, and reapply sequential compression device   ambulation promoted   bleeding precations maintained   bleeding risk assessed   bleeding risk factor(s) identified, provider notified   dorsiflexion/plantar flexion performed   fluids promoted   ROM (active) performed  Intervention: Prevent Infection  Flowsheets (Taken 7/7/2024 1815)  Infection Prevention:   cohorting utilized   hand hygiene promoted   single patient room provided   environmental surveillance performed   equipment surfaces disinfected   rest/sleep promoted  Goal: Optimal Comfort and Wellbeing  Outcome: Progressing  Intervention: Monitor Pain and Promote Comfort  Flowsheets (Taken 7/7/2024 1815)  Pain Management Interventions:   around-the-clock dosing utilized   awakened for pain meds per patient request   breathing exercises utilized   care clustered    cold applied   diversional activity provided   heat applied   medication offered   pain management plan reviewed with patient/caregiver   pillow support provided   position adjusted   premedicated for activity   relaxation techniques promoted   quiet environment facilitated   warm blanket provided  Intervention: Provide Person-Centered Care  Flowsheets (Taken 7/7/2024 1815)  Trust Relationship/Rapport:   care explained   questions answered   choices provided   questions encouraged   emotional support provided   reassurance provided   empathic listening provided   thoughts/feelings acknowledged     Problem: Fall Injury Risk  Goal: Absence of Fall and Fall-Related Injury  Outcome: Progressing  Intervention: Identify and Manage Contributors  Flowsheets (Taken 7/7/2024 1815)  Self-Care Promotion:   independence encouraged   meal set-up provided   BADL personal objects within reach   BADL personal routines maintained   adaptive equipment use encouraged  Medication Review/Management:   medications reviewed   high-risk medications identified   pharmacy consulted   provider consulted  Intervention: Promote Injury-Free Environment  Flowsheets (Taken 7/7/2024 1815)  Safety Promotion/Fall Prevention:   assistive device/personal item within reach   commode/urinal/bedpan at bedside   diversional activities provided   Fall Risk reviewed with patient/family   in recliner, wheels locked   lighting adjusted   medications reviewed   high risk medications identified   muscle strengthening facilitated   nonskid shoes/socks when out of bed   pulse ox   room near unit station     Problem: Infection  Goal: Absence of Infection Signs and Symptoms  Outcome: Progressing  Intervention: Prevent or Manage Infection  Flowsheets (Taken 7/7/2024 1815)  Fever Reduction/Comfort Measures:   fluid intake increased   lightweight clothing   lightweight bedding  Infection Management: aseptic technique maintained     Problem: Skin Injury Risk  Increased  Goal: Skin Health and Integrity  Outcome: Progressing  Intervention: Optimize Skin Protection  Flowsheets (Taken 7/7/2024 1815)  Pressure Reduction Techniques:   frequent weight shift encouraged   positioned off wounds  Pressure Reduction Devices:   positioning supports utilized   foam padding utilized  Skin Protection:   hydrocolloids used   incontinence pads utilized   pulse oximeter probe site changed  Activity Management: Ambulated -L4  Head of Bed (HOB) Positioning: HOB at 60-90 degrees  Intervention: Promote and Optimize Oral Intake  Flowsheets (Taken 7/7/2024 1815)  Oral Nutrition Promotion:   physical activity promoted   social interaction promoted   nutrition counseling provided   rest periods promoted  Nutrition Interventions:   meal set-up provided   referred to dietitian   supplemental foods provided   supplemental drinks provided   referred to nutrition assistant/tech   frequent small meals provided   food preferences provided   meals from home/family encouraged     Problem: Sickle Cell Disease  Goal: Optimal Pain Control and Function  Outcome: Progressing  Intervention: Manage Acute Pain  Flowsheets (Taken 7/7/2024 1815)  Sleep/Rest Enhancement:   awakenings minimized   consistent schedule promoted   natural light exposure provided   regular sleep/rest pattern promoted   relaxation techniques promoted   room darkened   therapeutic touch utilized  Diversional Activities:   movies   television   smartphone   reading  Pain Management Interventions:   around-the-clock dosing utilized   awakened for pain meds per patient request   breathing exercises utilized   care clustered   cold applied   diversional activity provided   heat applied   medication offered   pain management plan reviewed with patient/caregiver   pillow support provided   position adjusted   premedicated for activity   relaxation techniques promoted   quiet environment facilitated   warm blanket provided  Intervention:  Acknowledge Underlying Chronic Pain  Flowsheets (Taken 7/7/2024 1815)  Medication Review/Management:   medications reviewed   high-risk medications identified   pharmacy consulted   provider consulted     Problem: Acute Kidney Injury/Impairment  Goal: Improved Oral Intake  Intervention: Promote and Optimize Oral Intake  Flowsheets (Taken 7/7/2024 1815)  Oral Nutrition Promotion:   physical activity promoted   social interaction promoted   nutrition counseling provided   rest periods promoted  Nutrition Interventions:   meal set-up provided   referred to dietitian   supplemental foods provided   supplemental drinks provided   referred to nutrition assistant/tech   frequent small meals provided   food preferences provided   meals from home/family encouraged

## 2024-07-07 NOTE — PROGRESS NOTES
[facility] ([address] Medicine  Progress Note    Patient Name: Nazanin Malone  MRN: [id number]  Patient Class: IP- Inpatient   Admission Date: 6/26/2024  Length of Stay: 10 days  Attending Physician: Baltazar Barth MD  Primary Care Provider: Pee Montgomery MD        Subjective:     Principal Problem:Encephalopathy        HPI:  44 y.o. female presenting with chest pain and leg pain for about 3 days.  History of acute chest syndrome secondary to sickle cell anemia.  History of sickle cell beta thalassemia.  Has had multiple acute chest syndrome, pneumonia.  Also history of pulmonary embolism but has been off of her Eliquis for several months due to insurance related issues.  Does not take folic acid or hydroxyurea either.  Has a port in the right side of her chest, history of needing exchange transfusions.  Fairly significant history of acute chest syndrome, rhabdomyolysis resulting in fasciotomy of the right lower extremity, just recently as of last year October 20, 2023.  Recently moved here from [place], drove here 3 days ago.  No new leg swelling or muscle pains.    Overview/Hospital Course:  No notes on file    Interval History: Plan on dc home tomorrow, feeling better    Review of Systems  Objective:     Vital Signs (Most Recent):  Temp: 98.7 °F (37.1 °C) (07/07/24 0726)  Pulse: 76 (07/07/24 0726)  Resp: 18 (07/07/24 0840)  BP: 125/79 (07/07/24 0726)  SpO2: 99 % (07/07/24 0726) Vital Signs (24h Range):  Temp:  [97.9 °F (36.6 °C)-98.7 °F (37.1 °C)] 98.7 °F (37.1 °C)  Pulse:  [68-76] 76  Resp:  [18] 18  SpO2:  [96 %-100 %] 99 %  BP: ()/(63-83) 125/79     Weight: 86.2 kg (190 lb 0.6 oz)  Body mass index is 34.76 kg/m².    Intake/Output Summary (Last 24 hours) at 7/7/2024 0845  Last data filed at 7/6/2024 2000  Gross per 24 hour   Intake --   Output 400 ml   Net -400 ml         Physical Exam  Vitals and nursing note reviewed.   Constitutional:       General: She is not in acute  distress.     Appearance: She is ill-appearing.   HENT:      Head: Normocephalic.      Mouth/Throat:      Mouth: Mucous membranes are moist.      Pharynx: Oropharynx is clear.   Eyes:      Pupils: Pupils are equal, round, and reactive to light.   Cardiovascular:      Rate and Rhythm: Normal rate.      Pulses: Normal pulses.   Pulmonary:      Effort: Pulmonary effort is normal. No respiratory distress.      Breath sounds: Normal breath sounds.   Abdominal:      General: Abdomen is flat.      Palpations: Abdomen is soft.   Musculoskeletal:      Left upper arm: Swelling present.      Cervical back: Normal range of motion.      Right lower leg: No edema.      Left lower leg: Edema present.      Comments: Left lower arm swelling   Skin:     General: Skin is warm and dry.      Capillary Refill: Capillary refill takes less than 2 seconds.   Neurological:      General: No focal deficit present.      Mental Status: She is alert. Mental status is at baseline.   Psychiatric:         Mood and Affect: Mood normal.             Significant Labs: All pertinent labs within the past 24 hours have been reviewed.  BMP:   Recent Labs   Lab 07/07/24  0419   GLU 72      K 3.8      CO2 24   BUN 14   CREATININE 1.3   CALCIUM 9.5   MG 2.1       Significant Imaging: I have reviewed all pertinent imaging results/findings within the past 24 hours.    Assessment/Plan:      Hypokalemia  Patient has hypokalemia which is Acute and currently uncontrolled. Most recent potassium levels reviewed-   Lab Results   Component Value Date    K 3.0 (L) 06/26/2024   . Will continue potassium replacement per protocol and recheck repeat levels after replacement completed.     History of pulmonary embolism  CTA today: 1. Examination is essentially nondiagnostic for assessment of the pulmonary arteries beyond the level of the main right and left pulmonary arteries due to suboptimal contrast bolus timing and respiratory motion.  2. No definite acute  large central/saddle-type embolus.  Off of Elequis  Will restart Eliquis at 5mg BID, low threshold to increase to treatment dose.      Intermittent asthma  Stable      Anxiety        Trigeminal neuralgia  Complains of mild pain      Sickle-cell disease with pain  History of Acute Chest Syndrome and Pulmonary Embolisim  IVF for now  Dilaudid 2mg q4 prn  Has not been seen here in three years, will consult hem onc to reestablish care      Iron deficiency anemia  Patient's anemia is currently controlled. Has not received any PRBCs to date. Etiology likely d/t chronic blood loss  Current CBC reviewed-   Lab Results   Component Value Date    HGB 12.1 06/26/2024    HCT 40 06/26/2024     Monitor serial CBC and transfuse if patient becomes hemodynamically unstable, symptomatic or H/H drops below 7/21.      VTE Risk Mitigation (From admission, onward)           Ordered     apixaban tablet 5 mg  2 times daily         06/26/24 1846     IP VTE HIGH RISK PATIENT  Once         06/26/24 1846     Place sequential compression device  Until discontinued         06/26/24 1846                    Discharge Planning   ALYSE: 7/9/2024     Code Status: Full Code   Is the patient medically ready for discharge?:     Reason for patient still in hospital (select all that apply): Patient unstable  Discharge Plan A: Home with family   Discharge Delays: None known at this time              Baltazar Barth MD  Department of Hospital Medicine   [facility] - Med Surg ([place]

## 2024-07-07 NOTE — PHYSICIAN QUERY
Please clarify the nature/etiology of the patient's altered mental status/encephalopathy:      Metabolic Encephalopathy

## 2024-07-07 NOTE — PROVIDER PROGRESS NOTES - EMERGENCY DEPT.
Pharmacokinetic Assessment Follow Up: IV Vancomycin    Vancomycin serum concentration assessment/plan(s):    -Vancomycin trough drawn appropriately within goal of 15 - 20 for CONS bacteremia  -Continue vancomycin 1500 mg IVPB Q24H (stop 07/11)  -No further troughs needed at this time unless vancomycin extended or renal function declines (scr 1.3, slightly increased from yesterdays 1.1)    Drug levels (last 3 results):  Recent Labs   Lab Result Units 07/05/24  0853 07/07/24  0856   Vancomycin-Trough ug/mL 14.7 16.7       Pharmacy will continue to follow and monitor vancomycin.    Please contact pharmacy at extension [phone] for questions regarding this assessment.    Thank you for the consult,   Hung Ramos       Patient brief summary:  Nazanin Malone is a 44 y.o. female initiated on antimicrobial therapy with IV Vancomycin for treatment of bacteremia    The patient's current regimen is vancomycin 1500 mg IVPB Q24H    Drug Allergies:   Review of patient's allergies indicates:  No Known Allergies    Actual Body Weight:   86 kg    Renal Function:   Estimated Creatinine Clearance: 55.1 mL/min (based on SCr of 1.3 mg/dL).,     Dialysis Method (if applicable):  N/A    CBC (last 72 hours):  Recent Labs   Lab Result Units 07/05/24  0551 07/06/24  0525 07/07/24  0419   WBC K/uL 6.84 6.70 6.24   Hemoglobin g/dL 10.2* 9.8* 9.9*   Hematocrit % 30.7* 29.1* 29.6*   Platelets K/uL 302 282 268   Gran % % 37.8* 43.2 40.4   Lymph % % 45.9 39.7 39.9   Mono % % 12.7 13.0 15.2*   Eosinophil % % 3.1 3.6 3.8   Basophil % % 0.4 0.4 0.5   Differential Method  Automated Automated Automated       Metabolic Panel (last 72 hours):  Recent Labs   Lab Result Units 07/05/24  0551 07/06/24  0525 07/07/24  0419   Sodium mmol/L 139 140 139   Potassium mmol/L 3.8 3.8 3.8   Chloride mmol/L 104 107 108   CO2 mmol/L 29 27 24   Glucose mg/dL 70 98 72   BUN mg/dL 13 12 14   Creatinine mg/dL 1.1 1.1 1.3   Albumin g/dL 3.1* 3.0* 3.1*   Total Bilirubin  mg/dL 0.3 0.3 0.3   Alkaline Phosphatase U/L 72 70 72   AST U/L 22 18 22   ALT U/L 14 13 14   Magnesium mg/dL 2.1 2.0 2.1   Phosphorus mg/dL 3.7 3.6 4.0       Vancomycin Administrations:  vancomycin given in the last 96 hours                     vancomycin 1,500 mg in D5W 250 mL IVPB (Vial-Mate) (mg) 1,500 mg New Bag 07/06/24 0951     1,500 mg New Bag 07/05/24 1028     1,500 mg New Bag 07/04/24 0851                    Microbiologic Results:  Microbiology Results (last 7 days)       Procedure Component Value Units Date/Time    Blood culture [[id number]] Collected: 06/27/24 1544    Order Status: Completed Specimen: Blood Updated: 07/02/24 1812     Blood Culture, Routine No growth after 5 days.    Narrative:      Collection has been rescheduled by DF2 at 06/27/2024 14:22 Reason:   Patient unavailable.  Eileen BLANCO ext [phone].  Collection has been rescheduled by DF2 at 06/27/2024 14:22 Reason:   Patient unavailable.  Eileen BLANCO ext [phone].    Blood culture [[id number]] Collected: 06/27/24 1532    Order Status: Completed Specimen: Blood Updated: 07/02/24 1812     Blood Culture, Routine No growth after 5 days.    Narrative:      Collection has been rescheduled by DF2 at 06/27/2024 14:22 Reason:   Patient unavailable.  Eileen BLANCO ext [id number].  Collection has been rescheduled by DF2 at 06/27/2024 14:22 Reason:   Patient unavailable.  Eileen BLANCO ext [phone].

## 2024-07-07 NOTE — SUBJECTIVE & OBJECTIVE
Interval History: Plan on dc home tomorrow, feeling better    Review of Systems  Objective:     Vital Signs (Most Recent):  Temp: 98.7 °F (37.1 °C) (07/07/24 0726)  Pulse: 76 (07/07/24 0726)  Resp: 18 (07/07/24 0840)  BP: 125/79 (07/07/24 0726)  SpO2: 99 % (07/07/24 0726) Vital Signs (24h Range):  Temp:  [97.9 °F (36.6 °C)-98.7 °F (37.1 °C)] 98.7 °F (37.1 °C)  Pulse:  [68-76] 76  Resp:  [18] 18  SpO2:  [96 %-100 %] 99 %  BP: ()/(63-83) 125/79     Weight: 86.2 kg (190 lb 0.6 oz)  Body mass index is 34.76 kg/m².    Intake/Output Summary (Last 24 hours) at 7/7/2024 0845  Last data filed at 7/6/2024 2000  Gross per 24 hour   Intake --   Output 400 ml   Net -400 ml         Physical Exam  Vitals and nursing note reviewed.   Constitutional:       General: She is not in acute distress.     Appearance: She is ill-appearing.   HENT:      Head: Normocephalic.      Mouth/Throat:      Mouth: Mucous membranes are moist.      Pharynx: Oropharynx is clear.   Eyes:      Pupils: Pupils are equal, round, and reactive to light.   Cardiovascular:      Rate and Rhythm: Normal rate.      Pulses: Normal pulses.   Pulmonary:      Effort: Pulmonary effort is normal. No respiratory distress.      Breath sounds: Normal breath sounds.   Abdominal:      General: Abdomen is flat.      Palpations: Abdomen is soft.   Musculoskeletal:      Left upper arm: Swelling present.      Cervical back: Normal range of motion.      Right lower leg: No edema.      Left lower leg: Edema present.      Comments: Left lower arm swelling   Skin:     General: Skin is warm and dry.      Capillary Refill: Capillary refill takes less than 2 seconds.   Neurological:      General: No focal deficit present.      Mental Status: She is alert. Mental status is at baseline.   Psychiatric:         Mood and Affect: Mood normal.             Significant Labs: All pertinent labs within the past 24 hours have been reviewed.  BMP:   Recent Labs   Lab 07/07/24  0419   GLU 72       K 3.8      CO2 24   BUN 14   CREATININE 1.3   CALCIUM 9.5   MG 2.1       Significant Imaging: I have reviewed all pertinent imaging results/findings within the past 24 hours.

## 2024-07-08 LAB
ALBUMIN SERPL BCP-MCNC: 3 G/DL (ref 3.5–5.2)
ALP SERPL-CCNC: 66 U/L (ref 55–135)
ALT SERPL W/O P-5'-P-CCNC: 13 U/L (ref 10–44)
ANION GAP SERPL CALC-SCNC: 6 MMOL/L (ref 8–16)
AST SERPL-CCNC: 21 U/L (ref 10–40)
BASOPHILS # BLD AUTO: 0.04 K/UL (ref 0–0.2)
BASOPHILS NFR BLD: 0.6 % (ref 0–1.9)
BILIRUB SERPL-MCNC: 0.4 MG/DL (ref 0.1–1)
BUN SERPL-MCNC: 16 MG/DL (ref 6–20)
CALCIUM SERPL-MCNC: 9.1 MG/DL (ref 8.7–10.5)
CARBAMAZEPINE SERPL-MCNC: <1.9 UG/ML (ref 4–12)
CHLORIDE SERPL-SCNC: 108 MMOL/L (ref 95–110)
CO2 SERPL-SCNC: 24 MMOL/L (ref 23–29)
CREAT SERPL-MCNC: 1.2 MG/DL (ref 0.5–1.4)
DIFFERENTIAL METHOD BLD: ABNORMAL
EOSINOPHIL # BLD AUTO: 0.2 K/UL (ref 0–0.5)
EOSINOPHIL NFR BLD: 2.6 % (ref 0–8)
ERYTHROCYTE [DISTWIDTH] IN BLOOD BY AUTOMATED COUNT: 20 % (ref 11.5–14.5)
EST. GFR  (NO RACE VARIABLE): 56.5 ML/MIN/1.73 M^2
GLUCOSE SERPL-MCNC: 82 MG/DL (ref 70–110)
HCT VFR BLD AUTO: 27.1 % (ref 37–48.5)
HGB BLD-MCNC: 9.3 G/DL (ref 12–16)
IMM GRANULOCYTES # BLD AUTO: 0.01 K/UL (ref 0–0.04)
IMM GRANULOCYTES NFR BLD AUTO: 0.1 % (ref 0–0.5)
LYMPHOCYTES # BLD AUTO: 2.8 K/UL (ref 1–4.8)
LYMPHOCYTES NFR BLD: 39.9 % (ref 18–48)
MAGNESIUM SERPL-MCNC: 2 MG/DL (ref 1.6–2.6)
MCH RBC QN AUTO: 25.2 PG (ref 27–31)
MCHC RBC AUTO-ENTMCNC: 34.3 G/DL (ref 32–36)
MCV RBC AUTO: 73 FL (ref 82–98)
MONOCYTES # BLD AUTO: 0.8 K/UL (ref 0.3–1)
MONOCYTES NFR BLD: 10.8 % (ref 4–15)
NEUTROPHILS # BLD AUTO: 3.2 K/UL (ref 1.8–7.7)
NEUTROPHILS NFR BLD: 46 % (ref 38–73)
NRBC BLD-RTO: 1 /100 WBC
PHOSPHATE SERPL-MCNC: 4 MG/DL (ref 2.7–4.5)
PLATELET # BLD AUTO: 221 K/UL (ref 150–450)
PMV BLD AUTO: 9.9 FL (ref 9.2–12.9)
POTASSIUM SERPL-SCNC: 4 MMOL/L (ref 3.5–5.1)
PROT SERPL-MCNC: 6.3 G/DL (ref 6–8.4)
RBC # BLD AUTO: 3.69 M/UL (ref 4–5.4)
SODIUM SERPL-SCNC: 138 MMOL/L (ref 136–145)
WBC # BLD AUTO: 6.97 K/UL (ref 3.9–12.7)

## 2024-07-08 PROCEDURE — 63600175 PHARM REV CODE 636 W HCPCS: Performed by: STUDENT IN AN ORGANIZED HEALTH CARE EDUCATION/TRAINING PROGRAM

## 2024-07-08 PROCEDURE — 85025 COMPLETE CBC W/AUTO DIFF WBC: CPT | Performed by: NURSE PRACTITIONER

## 2024-07-08 PROCEDURE — 80156 ASSAY CARBAMAZEPINE TOTAL: CPT

## 2024-07-08 PROCEDURE — A4216 STERILE WATER/SALINE, 10 ML: HCPCS | Performed by: STUDENT IN AN ORGANIZED HEALTH CARE EDUCATION/TRAINING PROGRAM

## 2024-07-08 PROCEDURE — 25000003 PHARM REV CODE 250: Performed by: INTERNAL MEDICINE

## 2024-07-08 PROCEDURE — 83735 ASSAY OF MAGNESIUM: CPT

## 2024-07-08 PROCEDURE — 63600175 PHARM REV CODE 636 W HCPCS

## 2024-07-08 PROCEDURE — 80053 COMPREHEN METABOLIC PANEL: CPT | Performed by: NURSE PRACTITIONER

## 2024-07-08 PROCEDURE — 25000003 PHARM REV CODE 250: Performed by: STUDENT IN AN ORGANIZED HEALTH CARE EDUCATION/TRAINING PROGRAM

## 2024-07-08 PROCEDURE — 25000003 PHARM REV CODE 250

## 2024-07-08 PROCEDURE — 21400001 HC TELEMETRY ROOM

## 2024-07-08 PROCEDURE — 63600175 PHARM REV CODE 636 W HCPCS: Performed by: INTERNAL MEDICINE

## 2024-07-08 PROCEDURE — 84100 ASSAY OF PHOSPHORUS: CPT | Performed by: NURSE PRACTITIONER

## 2024-07-08 RX ORDER — HYDROMORPHONE HYDROCHLORIDE 1 MG/ML
1 INJECTION, SOLUTION INTRAMUSCULAR; INTRAVENOUS; SUBCUTANEOUS ONCE
Status: COMPLETED | OUTPATIENT
Start: 2024-07-08 | End: 2024-07-08

## 2024-07-08 RX ADMIN — HYDROMORPHONE HYDROCHLORIDE 2 MG: 1 INJECTION, SOLUTION INTRAMUSCULAR; INTRAVENOUS; SUBCUTANEOUS at 11:07

## 2024-07-08 RX ADMIN — Medication 10 ML: at 12:07

## 2024-07-08 RX ADMIN — CARVEDILOL 25 MG: 25 TABLET, FILM COATED ORAL at 09:07

## 2024-07-08 RX ADMIN — DIPHENHYDRAMINE HYDROCHLORIDE 25 MG: 25 CAPSULE ORAL at 09:07

## 2024-07-08 RX ADMIN — Medication 10 ML: at 05:07

## 2024-07-08 RX ADMIN — AMLODIPINE BESYLATE 10 MG: 10 TABLET ORAL at 08:07

## 2024-07-08 RX ADMIN — PROMETHAZINE HYDROCHLORIDE 25 MG: 12.5 TABLET ORAL at 03:07

## 2024-07-08 RX ADMIN — POTASSIUM CHLORIDE 20 MEQ: 1500 TABLET, EXTENDED RELEASE ORAL at 08:07

## 2024-07-08 RX ADMIN — HYDROMORPHONE HYDROCHLORIDE 2 MG: 1 INJECTION, SOLUTION INTRAMUSCULAR; INTRAVENOUS; SUBCUTANEOUS at 10:07

## 2024-07-08 RX ADMIN — HYDROMORPHONE HYDROCHLORIDE 2 MG: 1 INJECTION, SOLUTION INTRAMUSCULAR; INTRAVENOUS; SUBCUTANEOUS at 06:07

## 2024-07-08 RX ADMIN — TIZANIDINE 4 MG: 4 TABLET ORAL at 03:07

## 2024-07-08 RX ADMIN — HYDROMORPHONE HYDROCHLORIDE 1 MG: 1 INJECTION, SOLUTION INTRAMUSCULAR; INTRAVENOUS; SUBCUTANEOUS at 06:07

## 2024-07-08 RX ADMIN — PROMETHAZINE HYDROCHLORIDE 25 MG: 12.5 TABLET ORAL at 07:07

## 2024-07-08 RX ADMIN — PROMETHAZINE HYDROCHLORIDE 25 MG: 12.5 TABLET ORAL at 10:07

## 2024-07-08 RX ADMIN — SENNOSIDES AND DOCUSATE SODIUM 2 TABLET: 50; 8.6 TABLET ORAL at 09:07

## 2024-07-08 RX ADMIN — FLUOXETINE HYDROCHLORIDE 40 MG: 20 CAPSULE ORAL at 08:07

## 2024-07-08 RX ADMIN — CARVEDILOL 25 MG: 25 TABLET, FILM COATED ORAL at 08:07

## 2024-07-08 RX ADMIN — DIPHENHYDRAMINE HYDROCHLORIDE 25 MG: 25 CAPSULE ORAL at 07:07

## 2024-07-08 RX ADMIN — HYDROMORPHONE HYDROCHLORIDE 2 MG: 1 INJECTION, SOLUTION INTRAMUSCULAR; INTRAVENOUS; SUBCUTANEOUS at 07:07

## 2024-07-08 RX ADMIN — HYDROMORPHONE HYDROCHLORIDE 2 MG: 1 INJECTION, SOLUTION INTRAMUSCULAR; INTRAVENOUS; SUBCUTANEOUS at 03:07

## 2024-07-08 RX ADMIN — SENNOSIDES AND DOCUSATE SODIUM 2 TABLET: 50; 8.6 TABLET ORAL at 08:07

## 2024-07-08 RX ADMIN — APIXABAN 5 MG: 5 TABLET, FILM COATED ORAL at 08:07

## 2024-07-08 RX ADMIN — TIZANIDINE 4 MG: 4 TABLET ORAL at 07:07

## 2024-07-08 RX ADMIN — PROMETHAZINE HYDROCHLORIDE 12.5 MG: 25 INJECTION INTRAMUSCULAR; INTRAVENOUS at 11:07

## 2024-07-08 RX ADMIN — TIZANIDINE 4 MG: 4 TABLET ORAL at 09:07

## 2024-07-08 RX ADMIN — Medication 10 ML: at 06:07

## 2024-07-08 RX ADMIN — HYDROMORPHONE HYDROCHLORIDE 1 MG: 1 INJECTION, SOLUTION INTRAMUSCULAR; INTRAVENOUS; SUBCUTANEOUS at 11:07

## 2024-07-08 RX ADMIN — POTASSIUM CHLORIDE 20 MEQ: 1500 TABLET, EXTENDED RELEASE ORAL at 07:07

## 2024-07-08 RX ADMIN — DIPHENHYDRAMINE HYDROCHLORIDE 25 MG: 25 CAPSULE ORAL at 03:07

## 2024-07-08 RX ADMIN — HYDROMORPHONE HYDROCHLORIDE 2 MG: 1 INJECTION, SOLUTION INTRAMUSCULAR; INTRAVENOUS; SUBCUTANEOUS at 04:07

## 2024-07-08 RX ADMIN — CARVEDILOL 25 MG: 25 TABLET, FILM COATED ORAL at 07:07

## 2024-07-08 RX ADMIN — VANCOMYCIN HYDROCHLORIDE 1500 MG: 1.5 INJECTION, POWDER, LYOPHILIZED, FOR SOLUTION INTRAVENOUS at 09:07

## 2024-07-08 RX ADMIN — APIXABAN 5 MG: 5 TABLET, FILM COATED ORAL at 09:07

## 2024-07-08 NOTE — SUBJECTIVE & OBJECTIVE
Interval History: Has some nausea and some pain today, possible dc home today or in am will need home hela th    Review of Systems  Objective:     Vital Signs (Most Recent):  Temp: 99.2 °F (37.3 °C) (07/08/24 0826)  Pulse: 72 (07/08/24 0826)  Resp: 18 (07/08/24 0826)  BP: (!) 133/91 (07/08/24 0826)  SpO2: 98 % (07/08/24 0826) Vital Signs (24h Range):  Temp:  [98.5 °F (36.9 °C)-99.2 °F (37.3 °C)] 99.2 °F (37.3 °C)  Pulse:  [70-85] 72  Resp:  [17-18] 18  SpO2:  [94 %-98 %] 98 %  BP: (106-133)/(58-91) 133/91     Weight: 85.7 kg (188 lb 15 oz)  Body mass index is 34.56 kg/m².    Intake/Output Summary (Last 24 hours) at 7/8/2024 0956  Last data filed at 7/8/2024 0416  Gross per 24 hour   Intake 400 ml   Output 1390 ml   Net -990 ml         Physical Exam  Vitals and nursing note reviewed.   Constitutional:       General: She is not in acute distress.     Appearance: She is ill-appearing.   HENT:      Head: Normocephalic.      Mouth/Throat:      Mouth: Mucous membranes are moist.      Pharynx: Oropharynx is clear.   Eyes:      Pupils: Pupils are equal, round, and reactive to light.   Cardiovascular:      Rate and Rhythm: Normal rate.      Pulses: Normal pulses.   Pulmonary:      Effort: Pulmonary effort is normal. No respiratory distress.      Breath sounds: Normal breath sounds.   Abdominal:      General: Abdomen is flat.      Palpations: Abdomen is soft.   Musculoskeletal:      Left upper arm: Swelling present.      Cervical back: Normal range of motion.      Right lower leg: No edema.      Left lower leg: Edema present.      Comments: Left lower arm swelling   Skin:     General: Skin is warm and dry.      Capillary Refill: Capillary refill takes less than 2 seconds.   Neurological:      General: No focal deficit present.      Mental Status: She is alert. Mental status is at baseline.   Psychiatric:         Mood and Affect: Mood normal.             Significant Labs: All pertinent labs within the past 24 hours have been  reviewed.  BMP:   Recent Labs   Lab 07/08/24  0400   GLU 82      K 4.0      CO2 24   BUN 16   CREATININE 1.2   CALCIUM 9.1   MG 2.0       Significant Imaging: I have reviewed all pertinent imaging results/findings within the past 24 hours.

## 2024-07-08 NOTE — PLAN OF CARE
Problem: Adult Inpatient Plan of Care  Goal: Absence of Hospital-Acquired Illness or Injury  Outcome: Progressing  Intervention: Identify and Manage Fall Risk  Flowsheets (Taken 7/8/2024 1842)  Safety Promotion/Fall Prevention:   assistive device/personal item within reach   commode/urinal/bedpan at bedside   diversional activities provided   Fall Risk reviewed with patient/family   high risk medications identified   lighting adjusted   medications reviewed   muscle strengthening facilitated   nonskid shoes/socks when out of bed   pulse ox   room near unit station  Intervention: Prevent Skin Injury  Flowsheets (Taken 7/8/2024 1842)  Body Position: position changed independently  Skin Protection:   hydrocolloids used   incontinence pads utilized   pulse oximeter probe site changed  Device Skin Pressure Protection:   absorbent pad utilized/changed   adhesive use limited  Intervention: Prevent and Manage VTE (Venous Thromboembolism) Risk  Flowsheets (Taken 7/8/2024 1842)  VTE Prevention/Management:   remove, assess skin, and reapply sequential compression device   ambulation promoted   bleeding precations maintained   bleeding risk assessed   bleeding risk factor(s) identified, provider notified   fluids promoted   dorsiflexion/plantar flexion performed   ROM (active) performed  Intervention: Prevent Infection  Flowsheets (Taken 7/8/2024 1842)  Infection Prevention:   cohorting utilized   hand hygiene promoted   single patient room provided   environmental surveillance performed   equipment surfaces disinfected   rest/sleep promoted  Goal: Optimal Comfort and Wellbeing  Outcome: Progressing  Intervention: Monitor Pain and Promote Comfort  Flowsheets (Taken 7/8/2024 1842)  Pain Management Interventions:   around-the-clock dosing utilized   awakened for pain meds per patient request   care clustered   diversional activity provided   heat applied   cold applied   medication offered   pain management plan reviewed with  patient/caregiver   pillow support provided   position adjusted   premedicated for activity   relaxation techniques promoted   quiet environment facilitated   warm blanket provided  Intervention: Provide Person-Centered Care  Flowsheets (Taken 7/8/2024 1842)  Trust Relationship/Rapport:   care explained   questions answered   choices provided   questions encouraged   emotional support provided   reassurance provided   empathic listening provided   thoughts/feelings acknowledged     Problem: Pneumonia  Goal: Resolution of Infection Signs and Symptoms  Outcome: Progressing  Intervention: Prevent Infection Progression  Flowsheets (Taken 7/8/2024 1842)  Fever Reduction/Comfort Measures:   fluid intake increased   lightweight clothing   lightweight bedding  Infection Management: aseptic technique maintained     Problem: Fall Injury Risk  Goal: Absence of Fall and Fall-Related Injury  Outcome: Progressing  Intervention: Identify and Manage Contributors  Flowsheets (Taken 7/8/2024 1842)  Self-Care Promotion:   independence encouraged   meal set-up provided   BADL personal objects within reach   BADL personal routines maintained   adaptive equipment use encouraged  Medication Review/Management:   medications reviewed   high-risk medications identified   provider consulted   pharmacy consulted     Problem: Infection  Goal: Absence of Infection Signs and Symptoms  Outcome: Progressing  Intervention: Prevent or Manage Infection  Flowsheets (Taken 7/8/2024 1842)  Fever Reduction/Comfort Measures:   fluid intake increased   lightweight clothing   lightweight bedding  Infection Management: aseptic technique maintained     Problem: Skin Injury Risk Increased  Goal: Skin Health and Integrity  Outcome: Progressing  Intervention: Optimize Skin Protection  Flowsheets (Taken 7/8/2024 1842)  Pressure Reduction Techniques:   frequent weight shift encouraged   positioned off wounds  Skin Protection:   hydrocolloids used   incontinence  pads utilized   pulse oximeter probe site changed  Activity Management: Ambulated -L4  Intervention: Promote and Optimize Oral Intake  Flowsheets (Taken 7/8/2024 1842)  Oral Nutrition Promotion: physical activity promoted  Nutrition Interventions:   meal set-up provided   food preferences provided   frequent small meals provided   meals from home/family encouraged   supplemental drinks provided   supplemental foods provided     Problem: Sickle Cell Disease  Goal: Optimal Pain Control and Function  Outcome: Progressing  Intervention: Manage Acute Pain  Flowsheets (Taken 7/8/2024 1842)  Sleep/Rest Enhancement:   awakenings minimized   consistent schedule promoted   reading promoted   room darkened   therapeutic touch utilized   noise level reduced   relaxation techniques promoted   natural light exposure provided   regular sleep/rest pattern promoted  Diversional Activities:   movies   television   music   tablet   smartphone   reading  Pain Management Interventions:   around-the-clock dosing utilized   awakened for pain meds per patient request   care clustered   diversional activity provided   heat applied   cold applied   medication offered   pain management plan reviewed with patient/caregiver   pillow support provided   position adjusted   premedicated for activity   relaxation techniques promoted   quiet environment facilitated   warm blanket provided  Intervention: Acknowledge Underlying Chronic Pain  Flowsheets (Taken 7/8/2024 1842)  Medication Review/Management:   medications reviewed   high-risk medications identified   provider consulted   pharmacy consulted

## 2024-07-08 NOTE — PROGRESS NOTES
[facility] ([address] Medicine  Progress Note    Patient Name: Nazanin Malone  MRN: [id number]  Patient Class: IP- Inpatient   Admission Date: 6/26/2024  Length of Stay: 11 days  Attending Physician: Baltazar Barth MD  Primary Care Provider: Pee Montgomery MD        Subjective:     Principal Problem:Encephalopathy        HPI:  44 y.o. female presenting with chest pain and leg pain for about 3 days.  History of acute chest syndrome secondary to sickle cell anemia.  History of sickle cell beta thalassemia.  Has had multiple acute chest syndrome, pneumonia.  Also history of pulmonary embolism but has been off of her Eliquis for several months due to insurance related issues.  Does not take folic acid or hydroxyurea either.  Has a port in the right side of her chest, history of needing exchange transfusions.  Fairly significant history of acute chest syndrome, rhabdomyolysis resulting in fasciotomy of the right lower extremity, just recently as of last year October 20, 2023.  Recently moved here from [place], drove here 3 days ago.  No new leg swelling or muscle pains.    Overview/Hospital Course:  No notes on file    Interval History: Has some nausea and some pain today, possible dc home today or in am will need home hela th    Review of Systems  Objective:     Vital Signs (Most Recent):  Temp: 99.2 °F (37.3 °C) (07/08/24 0826)  Pulse: 72 (07/08/24 0826)  Resp: 18 (07/08/24 0826)  BP: (!) 133/91 (07/08/24 0826)  SpO2: 98 % (07/08/24 0826) Vital Signs (24h Range):  Temp:  [98.5 °F (36.9 °C)-99.2 °F (37.3 °C)] 99.2 °F (37.3 °C)  Pulse:  [70-85] 72  Resp:  [17-18] 18  SpO2:  [94 %-98 %] 98 %  BP: (106-133)/(58-91) 133/91     Weight: 85.7 kg (188 lb 15 oz)  Body mass index is 34.56 kg/m².    Intake/Output Summary (Last 24 hours) at 7/8/2024 0989  Last data filed at 7/8/2024 0416  Gross per 24 hour   Intake 400 ml   Output 1390 ml   Net -990 ml         Physical Exam  Vitals and nursing note  reviewed.   Constitutional:       General: She is not in acute distress.     Appearance: She is ill-appearing.   HENT:      Head: Normocephalic.      Mouth/Throat:      Mouth: Mucous membranes are moist.      Pharynx: Oropharynx is clear.   Eyes:      Pupils: Pupils are equal, round, and reactive to light.   Cardiovascular:      Rate and Rhythm: Normal rate.      Pulses: Normal pulses.   Pulmonary:      Effort: Pulmonary effort is normal. No respiratory distress.      Breath sounds: Normal breath sounds.   Abdominal:      General: Abdomen is flat.      Palpations: Abdomen is soft.   Musculoskeletal:      Left upper arm: Swelling present.      Cervical back: Normal range of motion.      Right lower leg: No edema.      Left lower leg: Edema present.      Comments: Left lower arm swelling   Skin:     General: Skin is warm and dry.      Capillary Refill: Capillary refill takes less than 2 seconds.   Neurological:      General: No focal deficit present.      Mental Status: She is alert. Mental status is at baseline.   Psychiatric:         Mood and Affect: Mood normal.             Significant Labs: All pertinent labs within the past 24 hours have been reviewed.  BMP:   Recent Labs   Lab 07/08/24  0400   GLU 82      K 4.0      CO2 24   BUN 16   CREATININE 1.2   CALCIUM 9.1   MG 2.0       Significant Imaging: I have reviewed all pertinent imaging results/findings within the past 24 hours.    Assessment/Plan:      Hypokalemia  Patient has hypokalemia which is Acute and currently uncontrolled. Most recent potassium levels reviewed-   Lab Results   Component Value Date    K 3.0 (L) 06/26/2024   . Will continue potassium replacement per protocol and recheck repeat levels after replacement completed.     History of pulmonary embolism  CTA today: 1. Examination is essentially nondiagnostic for assessment of the pulmonary arteries beyond the level of the main right and left pulmonary arteries due to suboptimal contrast  bolus timing and respiratory motion.  2. No definite acute large central/saddle-type embolus.  Off of Elequis  Will restart Eliquis at 5mg BID, low threshold to increase to treatment dose.      Intermittent asthma  Stable      Anxiety        Trigeminal neuralgia  Complains of mild pain      Sickle-cell disease with pain  History of Acute Chest Syndrome and Pulmonary Embolisim  IVF for now  Dilaudid 2mg q4 prn  Has not been seen here in three years, will consult hem onc to reestablish care      Iron deficiency anemia  Patient's anemia is currently controlled. Has not received any PRBCs to date. Etiology likely d/t chronic blood loss  Current CBC reviewed-   Lab Results   Component Value Date    HGB 12.1 06/26/2024    HCT 40 06/26/2024     Monitor serial CBC and transfuse if patient becomes hemodynamically unstable, symptomatic or H/H drops below 7/21.      VTE Risk Mitigation (From admission, onward)           Ordered     apixaban tablet 5 mg  2 times daily         06/26/24 1846     IP VTE HIGH RISK PATIENT  Once         06/26/24 1846     Place sequential compression device  Until discontinued         06/26/24 1846                    Discharge Planning   ALYSE: 7/9/2024     Code Status: Full Code   Is the patient medically ready for discharge?:     Reason for patient still in hospital (select all that apply): Patient unstable  Discharge Plan A: Home with family   Discharge Delays: None known at this time              Baltazar Barth MD  Department of Hospital Medicine   [facility] - Med Surg ([place]

## 2024-07-09 LAB
ALBUMIN SERPL BCP-MCNC: 2.9 G/DL (ref 3.5–5.2)
ALP SERPL-CCNC: 67 U/L (ref 55–135)
ALT SERPL W/O P-5'-P-CCNC: 15 U/L (ref 10–44)
ANION GAP SERPL CALC-SCNC: 5 MMOL/L (ref 8–16)
AST SERPL-CCNC: 22 U/L (ref 10–40)
BASOPHILS # BLD AUTO: 0.05 K/UL (ref 0–0.2)
BASOPHILS NFR BLD: 0.7 % (ref 0–1.9)
BILIRUB SERPL-MCNC: 0.3 MG/DL (ref 0.1–1)
BUN SERPL-MCNC: 15 MG/DL (ref 6–20)
CALCIUM SERPL-MCNC: 9 MG/DL (ref 8.7–10.5)
CARBAMAZEPINE SERPL-MCNC: <1.9 UG/ML (ref 4–12)
CHLORIDE SERPL-SCNC: 108 MMOL/L (ref 95–110)
CO2 SERPL-SCNC: 26 MMOL/L (ref 23–29)
CREAT SERPL-MCNC: 1 MG/DL (ref 0.5–1.4)
DIFFERENTIAL METHOD BLD: ABNORMAL
EOSINOPHIL # BLD AUTO: 0.2 K/UL (ref 0–0.5)
EOSINOPHIL NFR BLD: 2.9 % (ref 0–8)
ERYTHROCYTE [DISTWIDTH] IN BLOOD BY AUTOMATED COUNT: 19.9 % (ref 11.5–14.5)
EST. GFR  (NO RACE VARIABLE): >60 ML/MIN/1.73 M^2
GLUCOSE SERPL-MCNC: 78 MG/DL (ref 70–110)
HCT VFR BLD AUTO: 27.7 % (ref 37–48.5)
HGB BLD-MCNC: 9.2 G/DL (ref 12–16)
IMM GRANULOCYTES # BLD AUTO: 0.02 K/UL (ref 0–0.04)
IMM GRANULOCYTES NFR BLD AUTO: 0.3 % (ref 0–0.5)
LYMPHOCYTES # BLD AUTO: 2.7 K/UL (ref 1–4.8)
LYMPHOCYTES NFR BLD: 40.4 % (ref 18–48)
MAGNESIUM SERPL-MCNC: 1.9 MG/DL (ref 1.6–2.6)
MCH RBC QN AUTO: 24.9 PG (ref 27–31)
MCHC RBC AUTO-ENTMCNC: 33.2 G/DL (ref 32–36)
MCV RBC AUTO: 75 FL (ref 82–98)
MONOCYTES # BLD AUTO: 0.8 K/UL (ref 0.3–1)
MONOCYTES NFR BLD: 12.1 % (ref 4–15)
NEUTROPHILS # BLD AUTO: 3 K/UL (ref 1.8–7.7)
NEUTROPHILS NFR BLD: 43.6 % (ref 38–73)
NRBC BLD-RTO: 0 /100 WBC
PHOSPHATE SERPL-MCNC: 3.8 MG/DL (ref 2.7–4.5)
PLATELET # BLD AUTO: 235 K/UL (ref 150–450)
PMV BLD AUTO: 10.5 FL (ref 9.2–12.9)
POTASSIUM SERPL-SCNC: 3.7 MMOL/L (ref 3.5–5.1)
PROT SERPL-MCNC: 6.2 G/DL (ref 6–8.4)
RBC # BLD AUTO: 3.7 M/UL (ref 4–5.4)
SODIUM SERPL-SCNC: 139 MMOL/L (ref 136–145)
WBC # BLD AUTO: 6.79 K/UL (ref 3.9–12.7)

## 2024-07-09 PROCEDURE — 25000003 PHARM REV CODE 250: Performed by: INTERNAL MEDICINE

## 2024-07-09 PROCEDURE — 25000003 PHARM REV CODE 250

## 2024-07-09 PROCEDURE — 21400001 HC TELEMETRY ROOM

## 2024-07-09 PROCEDURE — 80156 ASSAY CARBAMAZEPINE TOTAL: CPT

## 2024-07-09 PROCEDURE — A4216 STERILE WATER/SALINE, 10 ML: HCPCS | Performed by: STUDENT IN AN ORGANIZED HEALTH CARE EDUCATION/TRAINING PROGRAM

## 2024-07-09 PROCEDURE — 84100 ASSAY OF PHOSPHORUS: CPT | Performed by: NURSE PRACTITIONER

## 2024-07-09 PROCEDURE — 83735 ASSAY OF MAGNESIUM: CPT

## 2024-07-09 PROCEDURE — 63600175 PHARM REV CODE 636 W HCPCS: Performed by: STUDENT IN AN ORGANIZED HEALTH CARE EDUCATION/TRAINING PROGRAM

## 2024-07-09 PROCEDURE — 94761 N-INVAS EAR/PLS OXIMETRY MLT: CPT

## 2024-07-09 PROCEDURE — 63600175 PHARM REV CODE 636 W HCPCS

## 2024-07-09 PROCEDURE — 25000003 PHARM REV CODE 250: Performed by: STUDENT IN AN ORGANIZED HEALTH CARE EDUCATION/TRAINING PROGRAM

## 2024-07-09 PROCEDURE — 63600175 PHARM REV CODE 636 W HCPCS: Performed by: INTERNAL MEDICINE

## 2024-07-09 PROCEDURE — 85025 COMPLETE CBC W/AUTO DIFF WBC: CPT | Performed by: NURSE PRACTITIONER

## 2024-07-09 PROCEDURE — 80053 COMPREHEN METABOLIC PANEL: CPT | Performed by: NURSE PRACTITIONER

## 2024-07-09 RX ORDER — HYDROMORPHONE HYDROCHLORIDE 1 MG/ML
1 INJECTION, SOLUTION INTRAMUSCULAR; INTRAVENOUS; SUBCUTANEOUS ONCE
Status: COMPLETED | OUTPATIENT
Start: 2024-07-09 | End: 2024-07-09

## 2024-07-09 RX ORDER — ONDANSETRON HYDROCHLORIDE 2 MG/ML
4 INJECTION, SOLUTION INTRAVENOUS ONCE
Status: COMPLETED | OUTPATIENT
Start: 2024-07-09 | End: 2024-07-09

## 2024-07-09 RX ADMIN — Medication 10 ML: at 10:07

## 2024-07-09 RX ADMIN — TIZANIDINE 4 MG: 4 TABLET ORAL at 06:07

## 2024-07-09 RX ADMIN — DIPHENHYDRAMINE HYDROCHLORIDE 25 MG: 25 CAPSULE ORAL at 02:07

## 2024-07-09 RX ADMIN — PROMETHAZINE HYDROCHLORIDE 25 MG: 12.5 TABLET ORAL at 02:07

## 2024-07-09 RX ADMIN — HYDROMORPHONE HYDROCHLORIDE 1 MG: 1 INJECTION, SOLUTION INTRAMUSCULAR; INTRAVENOUS; SUBCUTANEOUS at 09:07

## 2024-07-09 RX ADMIN — Medication 10 ML: at 05:07

## 2024-07-09 RX ADMIN — Medication 10 ML: at 11:07

## 2024-07-09 RX ADMIN — HYDROMORPHONE HYDROCHLORIDE 2 MG: 1 INJECTION, SOLUTION INTRAMUSCULAR; INTRAVENOUS; SUBCUTANEOUS at 02:07

## 2024-07-09 RX ADMIN — HYDROMORPHONE HYDROCHLORIDE 2 MG: 1 INJECTION, SOLUTION INTRAMUSCULAR; INTRAVENOUS; SUBCUTANEOUS at 06:07

## 2024-07-09 RX ADMIN — POLYETHYLENE GLYCOL 3350 17 G: 17 POWDER, FOR SOLUTION ORAL at 09:07

## 2024-07-09 RX ADMIN — HYDROMORPHONE HYDROCHLORIDE 2 MG: 1 INJECTION, SOLUTION INTRAMUSCULAR; INTRAVENOUS; SUBCUTANEOUS at 09:07

## 2024-07-09 RX ADMIN — VANCOMYCIN HYDROCHLORIDE 1500 MG: 1.5 INJECTION, POWDER, LYOPHILIZED, FOR SOLUTION INTRAVENOUS at 11:07

## 2024-07-09 RX ADMIN — PROMETHAZINE HYDROCHLORIDE 25 MG: 12.5 TABLET ORAL at 09:07

## 2024-07-09 RX ADMIN — ONDANSETRON 4 MG: 2 INJECTION INTRAMUSCULAR; INTRAVENOUS at 10:07

## 2024-07-09 RX ADMIN — AMLODIPINE BESYLATE 10 MG: 10 TABLET ORAL at 09:07

## 2024-07-09 RX ADMIN — CARVEDILOL 25 MG: 25 TABLET, FILM COATED ORAL at 10:07

## 2024-07-09 RX ADMIN — TIZANIDINE 4 MG: 4 TABLET ORAL at 10:07

## 2024-07-09 RX ADMIN — SENNOSIDES AND DOCUSATE SODIUM 2 TABLET: 50; 8.6 TABLET ORAL at 10:07

## 2024-07-09 RX ADMIN — CARVEDILOL 25 MG: 25 TABLET, FILM COATED ORAL at 09:07

## 2024-07-09 RX ADMIN — POTASSIUM CHLORIDE 20 MEQ: 1500 TABLET, EXTENDED RELEASE ORAL at 09:07

## 2024-07-09 RX ADMIN — TIZANIDINE 4 MG: 4 TABLET ORAL at 02:07

## 2024-07-09 RX ADMIN — DIPHENHYDRAMINE HYDROCHLORIDE 25 MG: 25 CAPSULE ORAL at 09:07

## 2024-07-09 RX ADMIN — FLUOXETINE HYDROCHLORIDE 40 MG: 20 CAPSULE ORAL at 09:07

## 2024-07-09 RX ADMIN — SENNOSIDES AND DOCUSATE SODIUM 2 TABLET: 50; 8.6 TABLET ORAL at 09:07

## 2024-07-09 RX ADMIN — Medication 10 ML: at 06:07

## 2024-07-09 RX ADMIN — APIXABAN 5 MG: 5 TABLET, FILM COATED ORAL at 09:07

## 2024-07-09 RX ADMIN — APIXABAN 5 MG: 5 TABLET, FILM COATED ORAL at 10:07

## 2024-07-09 RX ADMIN — DIPHENHYDRAMINE HYDROCHLORIDE 25 MG: 25 CAPSULE ORAL at 10:07

## 2024-07-09 RX ADMIN — HYDROMORPHONE HYDROCHLORIDE 2 MG: 1 INJECTION, SOLUTION INTRAMUSCULAR; INTRAVENOUS; SUBCUTANEOUS at 10:07

## 2024-07-09 RX ADMIN — Medication 10 ML: at 01:07

## 2024-07-09 NOTE — PLAN OF CARE
Problem: Adult Inpatient Plan of Care  Goal: Absence of Hospital-Acquired Illness or Injury  Outcome: Progressing  Intervention: Identify and Manage Fall Risk  Flowsheets (Taken 7/9/2024 1715)  Safety Promotion/Fall Prevention:   assistive device/personal item within reach   commode/urinal/bedpan at bedside   diversional activities provided   Fall Risk reviewed with patient/family   high risk medications identified   in recliner, wheels locked   lighting adjusted   medications reviewed   muscle strengthening facilitated   nonskid shoes/socks when out of bed   pulse ox   room near unit station  Intervention: Prevent Skin Injury  Flowsheets (Taken 7/9/2024 1715)  Body Position: position changed independently  Skin Protection:   incontinence pads utilized   pulse oximeter probe site changed  Device Skin Pressure Protection:   absorbent pad utilized/changed   adhesive use limited  Intervention: Prevent and Manage VTE (Venous Thromboembolism) Risk  Flowsheets (Taken 7/9/2024 1715)  VTE Prevention/Management:   remove, assess skin, and reapply sequential compression device   ambulation promoted   bleeding precations maintained   bleeding risk assessed   bleeding risk factor(s) identified, provider notified   fluids promoted   dorsiflexion/plantar flexion performed   ROM (active) performed  Intervention: Prevent Infection  Flowsheets (Taken 7/9/2024 1715)  Infection Prevention:   cohorting utilized   hand hygiene promoted   single patient room provided   environmental surveillance performed   equipment surfaces disinfected   rest/sleep promoted  Goal: Optimal Comfort and Wellbeing  Outcome: Progressing  Intervention: Monitor Pain and Promote Comfort  Flowsheets (Taken 7/9/2024 1715)  Pain Management Interventions:   around-the-clock dosing utilized   awakened for pain meds per patient request   care clustered   cold applied   diversional activity provided   heat applied   breathing exercises utilized   medication  offered   pain management plan reviewed with patient/caregiver   position adjusted   pillow support provided   relaxation techniques promoted   quiet environment facilitated   warm blanket provided  Intervention: Provide Person-Centered Care  Flowsheets (Taken 7/9/2024 1715)  Trust Relationship/Rapport:   care explained   questions answered   choices provided   questions encouraged   emotional support provided   reassurance provided   empathic listening provided   thoughts/feelings acknowledged     Problem: Fall Injury Risk  Goal: Absence of Fall and Fall-Related Injury  Outcome: Progressing  Intervention: Identify and Manage Contributors  Flowsheets (Taken 7/9/2024 1715)  Self-Care Promotion:   independence encouraged   meal set-up provided   BADL personal objects within reach   BADL personal routines maintained   adaptive equipment use encouraged  Medication Review/Management:   medications reviewed   high-risk medications identified   provider consulted   pharmacy consulted  Intervention: Promote Injury-Free Environment  Flowsheets (Taken 7/9/2024 1715)  Safety Promotion/Fall Prevention:   assistive device/personal item within reach   commode/urinal/bedpan at bedside   diversional activities provided   Fall Risk reviewed with patient/family   high risk medications identified   in recliner, wheels locked   lighting adjusted   medications reviewed   muscle strengthening facilitated   nonskid shoes/socks when out of bed   pulse ox   room near unit station     Problem: Infection  Goal: Absence of Infection Signs and Symptoms  Outcome: Progressing  Intervention: Prevent or Manage Infection  Flowsheets (Taken 7/9/2024 1715)  Fever Reduction/Comfort Measures:   fluid intake increased   lightweight clothing   lightweight bedding  Infection Management: aseptic technique maintained     Problem: Skin Injury Risk Increased  Goal: Skin Health and Integrity  Outcome: Progressing  Intervention: Optimize Skin  Protection  Flowsheets (Taken 7/9/2024 1715)  Pressure Reduction Techniques:   weight shift assistance provided   frequent weight shift encouraged   heels elevated off bed   pressure points protected  Pressure Reduction Devices: pressure-redistributing mattress utilized  Skin Protection:   incontinence pads utilized   pulse oximeter probe site changed  Activity Management: Ambulated -L4  Head of Bed (HOB) Positioning: HOB elevated  Intervention: Promote and Optimize Oral Intake  Flowsheets (Taken 7/9/2024 1715)  Oral Nutrition Promotion:   physical activity promoted   social interaction promoted   rest periods promoted  Nutrition Interventions:   supplemental foods provided   supplemental drinks provided   meal set-up provided     Problem: Sickle Cell Disease  Goal: Optimal Pain Control and Function  Outcome: Progressing  Intervention: Manage Acute Pain  Flowsheets (Taken 7/9/2024 1715)  Sleep/Rest Enhancement:   awakenings minimized   consistent schedule promoted   natural light exposure provided   regular sleep/rest pattern promoted   relaxation techniques promoted   noise level reduced   reading promoted   room darkened   therapeutic touch utilized  Diversional Activities:   movies   television   tablet   smartphone   reading  Pain Management Interventions:   around-the-clock dosing utilized   awakened for pain meds per patient request   care clustered   cold applied   diversional activity provided   heat applied   breathing exercises utilized   medication offered   pain management plan reviewed with patient/caregiver   position adjusted   pillow support provided   relaxation techniques promoted   quiet environment facilitated   warm blanket provided  Intervention: Acknowledge Underlying Chronic Pain  Flowsheets (Taken 7/9/2024 1715)  Medication Review/Management:   medications reviewed   high-risk medications identified   provider consulted   pharmacy consulted     Problem: Pneumonia  Goal: Effective Oxygenation  and Ventilation  Outcome: Adequate for Care Transition  Intervention: Promote Airway Secretion Clearance  Flowsheets (Taken 7/9/2024 1715)  Breathing Techniques/Airway Clearance: deep/controlled cough encouraged  Cough And Deep Breathing: done independently per patient  Intervention: Optimize Oxygenation and Ventilation  Flowsheets (Taken 7/9/2024 1715)  Airway/Ventilation Management: airway patency maintained  Head of Bed (HOB) Positioning: HOB elevated     Problem: Acute Kidney Injury/Impairment  Goal: Improved Oral Intake  Outcome: Adequate for Care Transition  Intervention: Promote and Optimize Oral Intake  Flowsheets (Taken 7/9/2024 1715)  Oral Nutrition Promotion:   physical activity promoted   social interaction promoted   rest periods promoted  Nutrition Interventions:   supplemental foods provided   supplemental drinks provided   meal set-up provided

## 2024-07-09 NOTE — PROGRESS NOTES
[facility] - Med Surg ([address] Medicine  Progress Note    Patient Name: Nazanin Malone  MRN: [id number]  Patient Class: IP- Inpatient   Admission Date: 6/26/2024  Length of Stay: 12 days  Attending Physician: Baltazar Barth MD  Primary Care Provider: Pee Montgomery MD        Subjective:     Principal Problem:Encephalopathy        HPI:  44 y.o. female presenting with chest pain and leg pain for about 3 days.  History of acute chest syndrome secondary to sickle cell anemia.  History of sickle cell beta thalassemia.  Has had multiple acute chest syndrome, pneumonia.  Also history of pulmonary embolism but has been off of her Eliquis for several months due to insurance related issues.  Does not take folic acid or hydroxyurea either.  Has a port in the right side of her chest, history of needing exchange transfusions.  Fairly significant history of acute chest syndrome, rhabdomyolysis resulting in fasciotomy of the right lower extremity, just recently as of last year October 20, 2023.  Recently moved here from [place], drove here 3 days ago.  No new leg swelling or muscle pains.    Overview/Hospital Course:  No notes on file    Interval History: Still in pain requiring IV pain medicines - prolonged stay.. H and H stable, exchange transfusions?    Review of Systems  Objective:     Vital Signs (Most Recent):  Temp: 98.1 °F (36.7 °C) (07/09/24 0737)  Pulse: 72 (07/09/24 0737)  Resp: 18 (07/09/24 0638)  BP: (!) 109/59 (07/09/24 0737)  SpO2: 95 % (07/09/24 0737) Vital Signs (24h Range):  Temp:  [96.2 °F (35.7 °C)-98.8 °F (37.1 °C)] 98.1 °F (36.7 °C)  Pulse:  [67-82] 72  Resp:  [18] 18  SpO2:  [95 %-98 %] 95 %  BP: (109-149)/(59-98) 109/59     Weight: 85.7 kg (188 lb 15 oz)  Body mass index is 34.56 kg/m².    Intake/Output Summary (Last 24 hours) at 7/9/2024 0936  Last data filed at 7/9/2024 0539  Gross per 24 hour   Intake 120 ml   Output 1600 ml   Net -1480 ml         Physical Exam  Vitals and nursing  note reviewed.   Constitutional:       General: She is not in acute distress.     Appearance: She is ill-appearing.   HENT:      Head: Normocephalic.      Mouth/Throat:      Mouth: Mucous membranes are moist.      Pharynx: Oropharynx is clear.   Eyes:      Pupils: Pupils are equal, round, and reactive to light.   Cardiovascular:      Rate and Rhythm: Normal rate.      Pulses: Normal pulses.   Pulmonary:      Effort: Pulmonary effort is normal. No respiratory distress.      Breath sounds: Normal breath sounds.   Abdominal:      General: Abdomen is flat.      Palpations: Abdomen is soft.   Musculoskeletal:      Left upper arm: Swelling present.      Cervical back: Normal range of motion.      Right lower leg: No edema.      Left lower leg: Edema present.      Comments: Left lower arm swelling   Skin:     General: Skin is warm and dry.      Capillary Refill: Capillary refill takes less than 2 seconds.   Neurological:      General: No focal deficit present.      Mental Status: She is alert. Mental status is at baseline.   Psychiatric:         Mood and Affect: Mood normal.             Significant Labs: All pertinent labs within the past 24 hours have been reviewed.  CBC:   Recent Labs   Lab 07/08/24  0400 07/09/24  0640   WBC 6.97 6.79   HGB 9.3* 9.2*   HCT 27.1* 27.7*    235       Significant Imaging: I have reviewed all pertinent imaging results/findings within the past 24 hours.    Assessment/Plan:      Hypokalemia  Patient has hypokalemia which is Acute and currently uncontrolled. Most recent potassium levels reviewed-   Lab Results   Component Value Date    K 3.0 (L) 06/26/2024   . Will continue potassium replacement per protocol and recheck repeat levels after replacement completed.     History of pulmonary embolism  CTA today: 1. Examination is essentially nondiagnostic for assessment of the pulmonary arteries beyond the level of the main right and left pulmonary arteries due to suboptimal contrast bolus  timing and respiratory motion.  2. No definite acute large central/saddle-type embolus.  Off of Elequis  Will restart Eliquis at 5mg BID, low threshold to increase to treatment dose.      Intermittent asthma  Stable      Anxiety        Trigeminal neuralgia  Complains of mild pain      Sickle-cell disease with pain  History of Acute Chest Syndrome and Pulmonary Embolisim  IVF for now  Dilaudid 2mg q4 prn  Has not been seen here in three years, will consult hem onc to reestablish care      Iron deficiency anemia  Patient's anemia is currently controlled. Has not received any PRBCs to date. Etiology likely d/t chronic blood loss  Current CBC reviewed-   Lab Results   Component Value Date    HGB 12.1 06/26/2024    HCT 40 06/26/2024     Monitor serial CBC and transfuse if patient becomes hemodynamically unstable, symptomatic or H/H drops below 7/21.      VTE Risk Mitigation (From admission, onward)           Ordered     apixaban tablet 5 mg  2 times daily         06/26/24 1846     IP VTE HIGH RISK PATIENT  Once         06/26/24 1846     Place sequential compression device  Until discontinued         06/26/24 1846                    Discharge Planning   ALYSE: 7/9/2024     Code Status: Full Code   Is the patient medically ready for discharge?:     Reason for patient still in hospital (select all that apply): Patient unstable  Discharge Plan A: Home with family   Discharge Delays: None known at this time              Baltazar Barth MD  Department of Hospital Medicine   [facility] - Med Surg ([place]

## 2024-07-09 NOTE — PLAN OF CARE
Problem: Adult Inpatient Plan of Care  Goal: Plan of Care Review  Outcome: Progressing  Goal: Patient-Specific Goal (Individualized)  Outcome: Progressing  Goal: Absence of Hospital-Acquired Illness or Injury  Outcome: Progressing  Goal: Optimal Comfort and Wellbeing  Outcome: Progressing  Goal: Readiness for Transition of Care  Outcome: Progressing     Problem: Pneumonia  Goal: Fluid Balance  Outcome: Progressing  Goal: Resolution of Infection Signs and Symptoms  Outcome: Progressing  Goal: Effective Oxygenation and Ventilation  Outcome: Progressing     Problem: Fall Injury Risk  Goal: Absence of Fall and Fall-Related Injury  Outcome: Progressing     Problem: Acute Kidney Injury/Impairment  Goal: Fluid and Electrolyte Balance  Outcome: Progressing  Goal: Improved Oral Intake  Outcome: Progressing  Goal: Effective Renal Function  Outcome: Progressing     Problem: Infection  Goal: Absence of Infection Signs and Symptoms  Outcome: Progressing     Problem: Skin Injury Risk Increased  Goal: Skin Health and Integrity  Outcome: Progressing     Problem: Sickle Cell Disease  Goal: Optimal Pain Control and Function  Outcome: Progressing

## 2024-07-09 NOTE — SUBJECTIVE & OBJECTIVE
Interval History: Still in pain requiring IV pain medicines - prolonged stay.. H and H stable, exchange transfusions?    Review of Systems  Objective:     Vital Signs (Most Recent):  Temp: 98.1 °F (36.7 °C) (07/09/24 0737)  Pulse: 72 (07/09/24 0737)  Resp: 18 (07/09/24 0638)  BP: (!) 109/59 (07/09/24 0737)  SpO2: 95 % (07/09/24 0737) Vital Signs (24h Range):  Temp:  [96.2 °F (35.7 °C)-98.8 °F (37.1 °C)] 98.1 °F (36.7 °C)  Pulse:  [67-82] 72  Resp:  [18] 18  SpO2:  [95 %-98 %] 95 %  BP: (109-149)/(59-98) 109/59     Weight: 85.7 kg (188 lb 15 oz)  Body mass index is 34.56 kg/m².    Intake/Output Summary (Last 24 hours) at 7/9/2024 0936  Last data filed at 7/9/2024 0539  Gross per 24 hour   Intake 120 ml   Output 1600 ml   Net -1480 ml         Physical Exam  Vitals and nursing note reviewed.   Constitutional:       General: She is not in acute distress.     Appearance: She is ill-appearing.   HENT:      Head: Normocephalic.      Mouth/Throat:      Mouth: Mucous membranes are moist.      Pharynx: Oropharynx is clear.   Eyes:      Pupils: Pupils are equal, round, and reactive to light.   Cardiovascular:      Rate and Rhythm: Normal rate.      Pulses: Normal pulses.   Pulmonary:      Effort: Pulmonary effort is normal. No respiratory distress.      Breath sounds: Normal breath sounds.   Abdominal:      General: Abdomen is flat.      Palpations: Abdomen is soft.   Musculoskeletal:      Left upper arm: Swelling present.      Cervical back: Normal range of motion.      Right lower leg: No edema.      Left lower leg: Edema present.      Comments: Left lower arm swelling   Skin:     General: Skin is warm and dry.      Capillary Refill: Capillary refill takes less than 2 seconds.   Neurological:      General: No focal deficit present.      Mental Status: She is alert. Mental status is at baseline.   Psychiatric:         Mood and Affect: Mood normal.             Significant Labs: All pertinent labs within the past 24 hours have  been reviewed.  CBC:   Recent Labs   Lab 07/08/24  0400 07/09/24  0640   WBC 6.97 6.79   HGB 9.3* 9.2*   HCT 27.1* 27.7*    235       Significant Imaging: I have reviewed all pertinent imaging results/findings within the past 24 hours.

## 2024-07-09 NOTE — PLAN OF CARE
[facility] - [facility] Surg ([facility])  Discharge Reassessment    Primary Care Provider: Pee Montgomery MD    Expected Discharge Date: 7/9/2024    Reassessment (most recent)       Discharge Reassessment - 07/09/24 1004          Discharge Reassessment    Assessment Type Discharge Planning Reassessment (P)      Did the patient's condition or plan change since previous assessment? No (P)      Discharge Plan discussed with: Patient (P)      Communicated ALYSE with patient/caregiver Date not available/Unable to determine (P)      Discharge Plan A Home with family (P)      Discharge Plan B Home Health (P)      DME Needed Upon Discharge  none (P)      Transition of Care Barriers None (P)      Why the patient remains in the hospital Requires continued medical care (P)         Post-Acute Status    Discharge Delays Post-Acute Set-up (P)                  CM spoke with pt in room. Pt states she doesn't know if she'll be able to go home with PICC line and IV abx or if she will stay in the hospital for a couple of more days to complete abx.  If pt is d/c'd before IV abx complete, she will need home IV abx and HH set up.  She states her brother will give her a ride home.  No DME needed.    MAYELA Martinez, BS, RN, CCM      Discharge Plan A and Plan B have been determined by review of patient's clinical status, future medical and therapeutic needs, and coverage/benefits for post-acute care in coordination with multidisciplinary team members.

## 2024-07-10 ENCOUNTER — TELEPHONE (OUTPATIENT)
Dept: HEMATOLOGY/ONCOLOGY | Facility: CLINIC | Age: 44
End: 2024-07-10
Payer: MEDICARE

## 2024-07-10 LAB
ALBUMIN SERPL BCP-MCNC: 3 G/DL (ref 3.5–5.2)
ALP SERPL-CCNC: 67 U/L (ref 55–135)
ALT SERPL W/O P-5'-P-CCNC: 13 U/L (ref 10–44)
ANION GAP SERPL CALC-SCNC: 6 MMOL/L (ref 8–16)
AST SERPL-CCNC: 19 U/L (ref 10–40)
BASOPHILS # BLD AUTO: 0.03 K/UL (ref 0–0.2)
BASOPHILS NFR BLD: 0.4 % (ref 0–1.9)
BILIRUB SERPL-MCNC: 0.3 MG/DL (ref 0.1–1)
BUN SERPL-MCNC: 13 MG/DL (ref 6–20)
CALCIUM SERPL-MCNC: 9.2 MG/DL (ref 8.7–10.5)
CARBAMAZEPINE SERPL-MCNC: <1.9 UG/ML (ref 4–12)
CHLORIDE SERPL-SCNC: 107 MMOL/L (ref 95–110)
CO2 SERPL-SCNC: 26 MMOL/L (ref 23–29)
CREAT SERPL-MCNC: 1.1 MG/DL (ref 0.5–1.4)
DIFFERENTIAL METHOD BLD: ABNORMAL
EOSINOPHIL # BLD AUTO: 0.2 K/UL (ref 0–0.5)
EOSINOPHIL NFR BLD: 2.9 % (ref 0–8)
ERYTHROCYTE [DISTWIDTH] IN BLOOD BY AUTOMATED COUNT: 19.9 % (ref 11.5–14.5)
EST. GFR  (NO RACE VARIABLE): >60 ML/MIN/1.73 M^2
GLUCOSE SERPL-MCNC: 127 MG/DL (ref 70–110)
HCT VFR BLD AUTO: 27.7 % (ref 37–48.5)
HGB BLD-MCNC: 9.4 G/DL (ref 12–16)
IMM GRANULOCYTES # BLD AUTO: 0.02 K/UL (ref 0–0.04)
IMM GRANULOCYTES NFR BLD AUTO: 0.3 % (ref 0–0.5)
LYMPHOCYTES # BLD AUTO: 2.7 K/UL (ref 1–4.8)
LYMPHOCYTES NFR BLD: 33.4 % (ref 18–48)
MAGNESIUM SERPL-MCNC: 1.8 MG/DL (ref 1.6–2.6)
MCH RBC QN AUTO: 25.1 PG (ref 27–31)
MCHC RBC AUTO-ENTMCNC: 33.9 G/DL (ref 32–36)
MCV RBC AUTO: 74 FL (ref 82–98)
MONOCYTES # BLD AUTO: 0.7 K/UL (ref 0.3–1)
MONOCYTES NFR BLD: 9.2 % (ref 4–15)
NEUTROPHILS # BLD AUTO: 4.3 K/UL (ref 1.8–7.7)
NEUTROPHILS NFR BLD: 53.8 % (ref 38–73)
NRBC BLD-RTO: 0 /100 WBC
PHOSPHATE SERPL-MCNC: 3.6 MG/DL (ref 2.7–4.5)
PLATELET # BLD AUTO: 97 K/UL (ref 150–450)
PMV BLD AUTO: 10 FL (ref 9.2–12.9)
POTASSIUM SERPL-SCNC: 3.9 MMOL/L (ref 3.5–5.1)
PROT SERPL-MCNC: 6.4 G/DL (ref 6–8.4)
RBC # BLD AUTO: 3.74 M/UL (ref 4–5.4)
SODIUM SERPL-SCNC: 139 MMOL/L (ref 136–145)
WBC # BLD AUTO: 7.96 K/UL (ref 3.9–12.7)

## 2024-07-10 PROCEDURE — 25000003 PHARM REV CODE 250: Performed by: INTERNAL MEDICINE

## 2024-07-10 PROCEDURE — A4216 STERILE WATER/SALINE, 10 ML: HCPCS | Performed by: STUDENT IN AN ORGANIZED HEALTH CARE EDUCATION/TRAINING PROGRAM

## 2024-07-10 PROCEDURE — 63600175 PHARM REV CODE 636 W HCPCS

## 2024-07-10 PROCEDURE — 25000003 PHARM REV CODE 250

## 2024-07-10 PROCEDURE — 85025 COMPLETE CBC W/AUTO DIFF WBC: CPT | Performed by: NURSE PRACTITIONER

## 2024-07-10 PROCEDURE — 83735 ASSAY OF MAGNESIUM: CPT

## 2024-07-10 PROCEDURE — 80053 COMPREHEN METABOLIC PANEL: CPT | Performed by: NURSE PRACTITIONER

## 2024-07-10 PROCEDURE — 25000003 PHARM REV CODE 250: Performed by: STUDENT IN AN ORGANIZED HEALTH CARE EDUCATION/TRAINING PROGRAM

## 2024-07-10 PROCEDURE — 80156 ASSAY CARBAMAZEPINE TOTAL: CPT

## 2024-07-10 PROCEDURE — 63600175 PHARM REV CODE 636 W HCPCS: Performed by: INTERNAL MEDICINE

## 2024-07-10 PROCEDURE — 21400001 HC TELEMETRY ROOM

## 2024-07-10 PROCEDURE — 84100 ASSAY OF PHOSPHORUS: CPT | Performed by: NURSE PRACTITIONER

## 2024-07-10 PROCEDURE — 63600175 PHARM REV CODE 636 W HCPCS: Performed by: STUDENT IN AN ORGANIZED HEALTH CARE EDUCATION/TRAINING PROGRAM

## 2024-07-10 RX ORDER — HYDROMORPHONE HYDROCHLORIDE 1 MG/ML
1 INJECTION, SOLUTION INTRAMUSCULAR; INTRAVENOUS; SUBCUTANEOUS ONCE
Status: COMPLETED | OUTPATIENT
Start: 2024-07-10 | End: 2024-07-10

## 2024-07-10 RX ORDER — HYDROMORPHONE HYDROCHLORIDE 1 MG/ML
2 INJECTION, SOLUTION INTRAMUSCULAR; INTRAVENOUS; SUBCUTANEOUS EVERY 4 HOURS PRN
Status: DISCONTINUED | OUTPATIENT
Start: 2024-07-10 | End: 2024-07-11

## 2024-07-10 RX ORDER — OXYCODONE AND ACETAMINOPHEN 10; 325 MG/1; MG/1
1 TABLET ORAL EVERY 6 HOURS PRN
Qty: 30 TABLET | Refills: 0 | OUTPATIENT
Start: 2024-07-10 | End: 2024-08-09

## 2024-07-10 RX ORDER — HYDROMORPHONE HYDROCHLORIDE 2 MG/ML
2 INJECTION, SOLUTION INTRAMUSCULAR; INTRAVENOUS; SUBCUTANEOUS ONCE
Status: COMPLETED | OUTPATIENT
Start: 2024-07-10 | End: 2024-07-10

## 2024-07-10 RX ORDER — PROMETHAZINE HYDROCHLORIDE 25 MG/1
25 TABLET ORAL EVERY 6 HOURS PRN
Qty: 30 TABLET | Refills: 2 | OUTPATIENT
Start: 2024-07-10

## 2024-07-10 RX ORDER — HYDROCODONE BITARTRATE AND ACETAMINOPHEN 7.5; 325 MG/1; MG/1
1 TABLET ORAL EVERY 6 HOURS PRN
Status: DISCONTINUED | OUTPATIENT
Start: 2024-07-10 | End: 2024-07-12 | Stop reason: HOSPADM

## 2024-07-10 RX ORDER — SODIUM CHLORIDE 9 MG/ML
INJECTION, SOLUTION INTRAVENOUS CONTINUOUS
Status: DISCONTINUED | OUTPATIENT
Start: 2024-07-10 | End: 2024-07-11

## 2024-07-10 RX ORDER — PREGABALIN 200 MG/1
200 CAPSULE ORAL 3 TIMES DAILY
Qty: 30 CAPSULE | Refills: 0 | OUTPATIENT
Start: 2024-07-10

## 2024-07-10 RX ORDER — CARBAMAZEPINE 200 MG/1
200 TABLET ORAL 4 TIMES DAILY
Qty: 120 TABLET | Refills: 1 | OUTPATIENT
Start: 2024-07-10 | End: 2024-09-08

## 2024-07-10 RX ORDER — TIZANIDINE 4 MG/1
4 TABLET ORAL EVERY 8 HOURS PRN
Qty: 30 TABLET | Refills: 0 | OUTPATIENT
Start: 2024-07-10 | End: 2024-07-20

## 2024-07-10 RX ADMIN — HYDROMORPHONE HYDROCHLORIDE 2 MG: 1 INJECTION, SOLUTION INTRAMUSCULAR; INTRAVENOUS; SUBCUTANEOUS at 06:07

## 2024-07-10 RX ADMIN — APIXABAN 5 MG: 5 TABLET, FILM COATED ORAL at 08:07

## 2024-07-10 RX ADMIN — HYDROMORPHONE HYDROCHLORIDE 2 MG: 1 INJECTION, SOLUTION INTRAMUSCULAR; INTRAVENOUS; SUBCUTANEOUS at 08:07

## 2024-07-10 RX ADMIN — TIZANIDINE 4 MG: 4 TABLET ORAL at 11:07

## 2024-07-10 RX ADMIN — Medication 10 ML: at 06:07

## 2024-07-10 RX ADMIN — Medication 10 ML: at 11:07

## 2024-07-10 RX ADMIN — PROMETHAZINE HYDROCHLORIDE 25 MG: 12.5 TABLET ORAL at 08:07

## 2024-07-10 RX ADMIN — HYDROMORPHONE HYDROCHLORIDE 2 MG: 1 INJECTION, SOLUTION INTRAMUSCULAR; INTRAVENOUS; SUBCUTANEOUS at 11:07

## 2024-07-10 RX ADMIN — AMLODIPINE BESYLATE 10 MG: 10 TABLET ORAL at 08:07

## 2024-07-10 RX ADMIN — CARVEDILOL 25 MG: 25 TABLET, FILM COATED ORAL at 08:07

## 2024-07-10 RX ADMIN — SENNOSIDES AND DOCUSATE SODIUM 2 TABLET: 50; 8.6 TABLET ORAL at 08:07

## 2024-07-10 RX ADMIN — HYDROMORPHONE HYDROCHLORIDE 1 MG: 1 INJECTION, SOLUTION INTRAMUSCULAR; INTRAVENOUS; SUBCUTANEOUS at 06:07

## 2024-07-10 RX ADMIN — PROMETHAZINE HYDROCHLORIDE 25 MG: 12.5 TABLET ORAL at 01:07

## 2024-07-10 RX ADMIN — HYDROCODONE BITARTRATE AND ACETAMINOPHEN 1 TABLET: 7.5; 325 TABLET ORAL at 10:07

## 2024-07-10 RX ADMIN — PROMETHAZINE HYDROCHLORIDE 25 MG: 12.5 TABLET ORAL at 11:07

## 2024-07-10 RX ADMIN — PROMETHAZINE HYDROCHLORIDE 25 MG: 12.5 TABLET ORAL at 03:07

## 2024-07-10 RX ADMIN — Medication 10 ML: at 02:07

## 2024-07-10 RX ADMIN — VANCOMYCIN HYDROCHLORIDE 1500 MG: 1.5 INJECTION, POWDER, LYOPHILIZED, FOR SOLUTION INTRAVENOUS at 09:07

## 2024-07-10 RX ADMIN — TIZANIDINE 4 MG: 4 TABLET ORAL at 03:07

## 2024-07-10 RX ADMIN — TIZANIDINE 4 MG: 4 TABLET ORAL at 06:07

## 2024-07-10 RX ADMIN — SODIUM CHLORIDE: 9 INJECTION, SOLUTION INTRAVENOUS at 06:07

## 2024-07-10 RX ADMIN — HYDROMORPHONE HYDROCHLORIDE 2 MG: 2 INJECTION INTRAMUSCULAR; INTRAVENOUS; SUBCUTANEOUS at 09:07

## 2024-07-10 RX ADMIN — HYDROMORPHONE HYDROCHLORIDE 2 MG: 1 INJECTION, SOLUTION INTRAMUSCULAR; INTRAVENOUS; SUBCUTANEOUS at 02:07

## 2024-07-10 RX ADMIN — POTASSIUM CHLORIDE 20 MEQ: 1500 TABLET, EXTENDED RELEASE ORAL at 08:07

## 2024-07-10 RX ADMIN — FLUOXETINE HYDROCHLORIDE 40 MG: 20 CAPSULE ORAL at 08:07

## 2024-07-10 RX ADMIN — DIPHENHYDRAMINE HYDROCHLORIDE 25 MG: 25 CAPSULE ORAL at 08:07

## 2024-07-10 RX ADMIN — HYDROMORPHONE HYDROCHLORIDE 2 MG: 1 INJECTION, SOLUTION INTRAMUSCULAR; INTRAVENOUS; SUBCUTANEOUS at 03:07

## 2024-07-10 NOTE — PLAN OF CARE
Problem: Adult Inpatient Plan of Care  Goal: Plan of Care Review  Outcome: Progressing  Goal: Patient-Specific Goal (Individualized)  Outcome: Progressing  Goal: Absence of Hospital-Acquired Illness or Injury  Outcome: Progressing  Goal: Optimal Comfort and Wellbeing  Outcome: Progressing  Goal: Readiness for Transition of Care  Outcome: Progressing     Problem: Pneumonia  Goal: Fluid Balance  Outcome: Progressing  Goal: Resolution of Infection Signs and Symptoms  Outcome: Progressing  Goal: Effective Oxygenation and Ventilation  Outcome: Progressing     Problem: Fall Injury Risk  Goal: Absence of Fall and Fall-Related Injury  Outcome: Progressing     Problem: Acute Kidney Injury/Impairment  Goal: Fluid and Electrolyte Balance  Outcome: Progressing  Goal: Improved Oral Intake  Outcome: Progressing  Goal: Effective Renal Function  Outcome: Progressing     Problem: Infection  Goal: Absence of Infection Signs and Symptoms  Outcome: Progressing     Problem: Skin Injury Risk Increased  Goal: Skin Health and Integrity  Outcome: Progressing     Problem: Sickle Cell Disease  Goal: Optimal Pain Control and Function  Outcome: Progressing     Patient requesting to stay one more night to get her pain under control a little better and to get some IVFs.  IVFs started and one time dose of pain med given at 1800.

## 2024-07-10 NOTE — PLAN OF CARE
Follow up with Vijaya Soni MD  CHW called to schedule pcp f/u, Jesse (rep) sent message to clinic to call the patient to schedule appt. [id number]  [facility] [id number]  [phone]

## 2024-07-10 NOTE — TELEPHONE ENCOUNTER
"----- Message from Jesse Stovall sent at 7/10/2024 10:54 AM CDT -----  Scheduling Request    Patient Status: Np    Scheduling Appt:  Re-establish care last seen 3/2021 w/ for Sickle cell beta thalassemia  per Nick BLANCO scheduled w/1st available ; Hospital F/u    Time/Date Preference:  Within 1 week    Relationship to Patient?: Melissa [[facility]]    Contact Preference: Requesting to contact pt  [phone]    Treating Provider: Ulises [Formerly in 2021]    Do you feel you need to be seen today? No      Additional Notes: Requesting to still keep 9/23 appt    "Thank you for all that you do for our patients"  "

## 2024-07-10 NOTE — PROGRESS NOTES
RD triggered 2/2 LOS - Day 14.    Pt scheduled to discharge today.    Per RN documentation, pt tolerating Regular diet w/ 100% PO intake. Unsure of UBW (no wt hx in chart) - pt appears nourished w/ no physical signs of malnutrition.    Thanks!  Trudi MS, RD, LDN

## 2024-07-10 NOTE — PROGRESS NOTES
[facility] - Med Surg ([address] Medicine  Progress Note    Patient Name: Nazanin Malone  MRN: [id number]  Patient Class: IP- Inpatient   Admission Date: 6/26/2024  Length of Stay: 13 days  Attending Physician: Baltazar Barth MD  Primary Care Provider: Pee Montgomery MD        Subjective:     Principal Problem:Encephalopathy        HPI:  44 y.o. female presenting with chest pain and leg pain for about 3 days.  History of acute chest syndrome secondary to sickle cell anemia.  History of sickle cell beta thalassemia.  Has had multiple acute chest syndrome, pneumonia.  Also history of pulmonary embolism but has been off of her Eliquis for several months due to insurance related issues.  Does not take folic acid or hydroxyurea either.  Has a port in the right side of her chest, history of needing exchange transfusions.  Fairly significant history of acute chest syndrome, rhabdomyolysis resulting in fasciotomy of the right lower extremity, just recently as of last year October 20, 2023.  Recently moved here from [place], drove here 3 days ago.  No new leg swelling or muscle pains.    Overview/Hospital Course:  No notes on file    Interval History: Has completed vancomycin and ready for discharge. Still in some pain. Would lie to MN home today or in am.    Review of Systems  Objective:     Vital Signs (Most Recent):  Temp: 98 °F (36.7 °C) (07/10/24 1135)  Pulse: 68 (07/10/24 1135)  Resp: 20 (07/10/24 1143)  BP: 108/63 (07/10/24 1135)  SpO2: 98 % (07/10/24 1135) Vital Signs (24h Range):  Temp:  [98 °F (36.7 °C)-99.1 °F (37.3 °C)] 98 °F (36.7 °C)  Pulse:  [66-76] 68  Resp:  [16-20] 20  SpO2:  [95 %-98 %] 98 %  BP: (108-127)/(61-83) 108/63     Weight: 85.7 kg (188 lb 15 oz)  Body mass index is 34.56 kg/m².    Intake/Output Summary (Last 24 hours) at 7/10/2024 1217  Last data filed at 7/10/2024 0623  Gross per 24 hour   Intake --   Output 1700 ml   Net -1700 ml         Physical Exam  Vitals and nursing  note reviewed.   Constitutional:       General: She is not in acute distress.     Appearance: She is ill-appearing.   HENT:      Head: Normocephalic.      Mouth/Throat:      Mouth: Mucous membranes are moist.      Pharynx: Oropharynx is clear.   Eyes:      Pupils: Pupils are equal, round, and reactive to light.   Cardiovascular:      Rate and Rhythm: Normal rate.      Pulses: Normal pulses.   Pulmonary:      Effort: Pulmonary effort is normal. No respiratory distress.      Breath sounds: Normal breath sounds.   Abdominal:      General: Abdomen is flat.      Palpations: Abdomen is soft.   Musculoskeletal:      Left upper arm: Swelling present.      Cervical back: Normal range of motion.      Right lower leg: No edema.      Left lower leg: Edema present.      Comments: Left lower arm swelling   Skin:     General: Skin is warm and dry.      Capillary Refill: Capillary refill takes less than 2 seconds.   Neurological:      General: No focal deficit present.      Mental Status: She is alert. Mental status is at baseline.   Psychiatric:         Mood and Affect: Mood normal.             Significant Labs: All pertinent labs within the past 24 hours have been reviewed.  BMP:   Recent Labs   Lab 07/10/24  0204   *      K 3.9      CO2 26   BUN 13   CREATININE 1.1   CALCIUM 9.2   MG 1.8       Significant Imaging: I have reviewed all pertinent imaging results/findings within the past 24 hours.    Assessment/Plan:      Hypokalemia  Patient has hypokalemia which is Acute and currently uncontrolled. Most recent potassium levels reviewed-   Lab Results   Component Value Date    K 3.0 (L) 06/26/2024   . Will continue potassium replacement per protocol and recheck repeat levels after replacement completed.     History of pulmonary embolism  CTA today: 1. Examination is essentially nondiagnostic for assessment of the pulmonary arteries beyond the level of the main right and left pulmonary arteries due to suboptimal  contrast bolus timing and respiratory motion.  2. No definite acute large central/saddle-type embolus.  Off of Elequis  Will restart Eliquis at 5mg BID, low threshold to increase to treatment dose.      Intermittent asthma  Stable      Anxiety        Trigeminal neuralgia  Complains of mild pain      Sickle-cell disease with pain  History of Acute Chest Syndrome and Pulmonary Embolisim  IVF for now  Dilaudid 2mg q4 prn  Has not been seen here in three years, will consult hem onc to reestablish care      Iron deficiency anemia  Patient's anemia is currently controlled. Has not received any PRBCs to date. Etiology likely d/t chronic blood loss  Current CBC reviewed-   Lab Results   Component Value Date    HGB 12.1 06/26/2024    HCT 40 06/26/2024     Monitor serial CBC and transfuse if patient becomes hemodynamically unstable, symptomatic or H/H drops below 7/21.      VTE Risk Mitigation (From admission, onward)           Ordered     apixaban tablet 5 mg  2 times daily         06/26/24 1846     IP VTE HIGH RISK PATIENT  Once         06/26/24 1846     Place sequential compression device  Until discontinued         06/26/24 1846                    Discharge Planning   ALYSE: 7/10/2024     Code Status: Full Code   Is the patient medically ready for discharge?:     Reason for patient still in hospital (select all that apply): Patient unstable  Discharge Plan A: Home with family   Discharge Delays: (!) Post-Acute Set-up              Baltazar Barth MD  Department of Hospital Medicine   [facility] Surg ([place]-16)

## 2024-07-10 NOTE — SUBJECTIVE & OBJECTIVE
Interval History: Has completed vancomycin and ready for discharge. Still in some pain.    Review of Systems  Objective:     Vital Signs (Most Recent):  Temp: 98 °F (36.7 °C) (07/10/24 1135)  Pulse: 68 (07/10/24 1135)  Resp: 20 (07/10/24 1143)  BP: 108/63 (07/10/24 1135)  SpO2: 98 % (07/10/24 1135) Vital Signs (24h Range):  Temp:  [98 °F (36.7 °C)-99.1 °F (37.3 °C)] 98 °F (36.7 °C)  Pulse:  [66-76] 68  Resp:  [16-20] 20  SpO2:  [95 %-98 %] 98 %  BP: (108-127)/(61-83) 108/63     Weight: 85.7 kg (188 lb 15 oz)  Body mass index is 34.56 kg/m².    Intake/Output Summary (Last 24 hours) at 7/10/2024 1217  Last data filed at 7/10/2024 0623  Gross per 24 hour   Intake --   Output 1700 ml   Net -1700 ml         Physical Exam  Vitals and nursing note reviewed.   Constitutional:       General: She is not in acute distress.     Appearance: She is ill-appearing.   HENT:      Head: Normocephalic.      Mouth/Throat:      Mouth: Mucous membranes are moist.      Pharynx: Oropharynx is clear.   Eyes:      Pupils: Pupils are equal, round, and reactive to light.   Cardiovascular:      Rate and Rhythm: Normal rate.      Pulses: Normal pulses.   Pulmonary:      Effort: Pulmonary effort is normal. No respiratory distress.      Breath sounds: Normal breath sounds.   Abdominal:      General: Abdomen is flat.      Palpations: Abdomen is soft.   Musculoskeletal:      Left upper arm: Swelling present.      Cervical back: Normal range of motion.      Right lower leg: No edema.      Left lower leg: Edema present.      Comments: Left lower arm swelling   Skin:     General: Skin is warm and dry.      Capillary Refill: Capillary refill takes less than 2 seconds.   Neurological:      General: No focal deficit present.      Mental Status: She is alert. Mental status is at baseline.   Psychiatric:         Mood and Affect: Mood normal.             Significant Labs: All pertinent labs within the past 24 hours have been reviewed.  BMP:   Recent Labs    Lab 07/10/24  0204   *      K 3.9      CO2 26   BUN 13   CREATININE 1.1   CALCIUM 9.2   MG 1.8       Significant Imaging: I have reviewed all pertinent imaging results/findings within the past 24 hours.

## 2024-07-10 NOTE — PLAN OF CARE
CM spoke with pt in room to discuss d/c.  CM instructed that d/c orders are in, but in huddle CM spoke with nurses, who said she might be staying another night.  Pt states she's not sure, MD hasn't made his mind up yet.  CM messaged Dr. Barth to clarify.    Chelsea Caceres, THERESAN, BS, RN, [facility]

## 2024-07-11 LAB
ALBUMIN SERPL BCP-MCNC: 3.1 G/DL (ref 3.5–5.2)
ALP SERPL-CCNC: 76 U/L (ref 55–135)
ALT SERPL W/O P-5'-P-CCNC: 12 U/L (ref 10–44)
ANION GAP SERPL CALC-SCNC: 9 MMOL/L (ref 8–16)
AST SERPL-CCNC: 19 U/L (ref 10–40)
BASOPHILS # BLD AUTO: 0.04 K/UL (ref 0–0.2)
BASOPHILS NFR BLD: 0.5 % (ref 0–1.9)
BILIRUB SERPL-MCNC: 0.3 MG/DL (ref 0.1–1)
BUN SERPL-MCNC: 13 MG/DL (ref 6–20)
CALCIUM SERPL-MCNC: 9.2 MG/DL (ref 8.7–10.5)
CARBAMAZEPINE SERPL-MCNC: <1.9 UG/ML (ref 4–12)
CHLORIDE SERPL-SCNC: 106 MMOL/L (ref 95–110)
CO2 SERPL-SCNC: 25 MMOL/L (ref 23–29)
CREAT SERPL-MCNC: 1.3 MG/DL (ref 0.5–1.4)
DIFFERENTIAL METHOD BLD: ABNORMAL
EOSINOPHIL # BLD AUTO: 0.3 K/UL (ref 0–0.5)
EOSINOPHIL NFR BLD: 3.5 % (ref 0–8)
ERYTHROCYTE [DISTWIDTH] IN BLOOD BY AUTOMATED COUNT: 19.9 % (ref 11.5–14.5)
EST. GFR  (NO RACE VARIABLE): 51.4 ML/MIN/1.73 M^2
GLUCOSE SERPL-MCNC: 106 MG/DL (ref 70–110)
HCT VFR BLD AUTO: 28.1 % (ref 37–48.5)
HGB BLD-MCNC: 9.7 G/DL (ref 12–16)
IMM GRANULOCYTES # BLD AUTO: 0.02 K/UL (ref 0–0.04)
IMM GRANULOCYTES NFR BLD AUTO: 0.3 % (ref 0–0.5)
LYMPHOCYTES # BLD AUTO: 2.9 K/UL (ref 1–4.8)
LYMPHOCYTES NFR BLD: 37.2 % (ref 18–48)
MAGNESIUM SERPL-MCNC: 1.8 MG/DL (ref 1.6–2.6)
MCH RBC QN AUTO: 25.5 PG (ref 27–31)
MCHC RBC AUTO-ENTMCNC: 34.5 G/DL (ref 32–36)
MCV RBC AUTO: 74 FL (ref 82–98)
MONOCYTES # BLD AUTO: 0.8 K/UL (ref 0.3–1)
MONOCYTES NFR BLD: 10 % (ref 4–15)
NEUTROPHILS # BLD AUTO: 3.7 K/UL (ref 1.8–7.7)
NEUTROPHILS NFR BLD: 48.5 % (ref 38–73)
NRBC BLD-RTO: 0 /100 WBC
PHOSPHATE SERPL-MCNC: 3.5 MG/DL (ref 2.7–4.5)
PLATELET # BLD AUTO: 256 K/UL (ref 150–450)
PMV BLD AUTO: 10.4 FL (ref 9.2–12.9)
POTASSIUM SERPL-SCNC: 3.7 MMOL/L (ref 3.5–5.1)
PROT SERPL-MCNC: 6.7 G/DL (ref 6–8.4)
RBC # BLD AUTO: 3.8 M/UL (ref 4–5.4)
SODIUM SERPL-SCNC: 140 MMOL/L (ref 136–145)
WBC # BLD AUTO: 7.67 K/UL (ref 3.9–12.7)

## 2024-07-11 PROCEDURE — 25000003 PHARM REV CODE 250: Performed by: INTERNAL MEDICINE

## 2024-07-11 PROCEDURE — 25000003 PHARM REV CODE 250: Performed by: HOSPITALIST

## 2024-07-11 PROCEDURE — 85025 COMPLETE CBC W/AUTO DIFF WBC: CPT | Performed by: NURSE PRACTITIONER

## 2024-07-11 PROCEDURE — 25000003 PHARM REV CODE 250

## 2024-07-11 PROCEDURE — 25000003 PHARM REV CODE 250: Performed by: STUDENT IN AN ORGANIZED HEALTH CARE EDUCATION/TRAINING PROGRAM

## 2024-07-11 PROCEDURE — 80156 ASSAY CARBAMAZEPINE TOTAL: CPT

## 2024-07-11 PROCEDURE — 80053 COMPREHEN METABOLIC PANEL: CPT | Performed by: NURSE PRACTITIONER

## 2024-07-11 PROCEDURE — 21400001 HC TELEMETRY ROOM

## 2024-07-11 PROCEDURE — 63600175 PHARM REV CODE 636 W HCPCS: Performed by: INTERNAL MEDICINE

## 2024-07-11 PROCEDURE — 83735 ASSAY OF MAGNESIUM: CPT

## 2024-07-11 PROCEDURE — A4216 STERILE WATER/SALINE, 10 ML: HCPCS | Performed by: STUDENT IN AN ORGANIZED HEALTH CARE EDUCATION/TRAINING PROGRAM

## 2024-07-11 PROCEDURE — 84100 ASSAY OF PHOSPHORUS: CPT | Performed by: NURSE PRACTITIONER

## 2024-07-11 PROCEDURE — 36415 COLL VENOUS BLD VENIPUNCTURE: CPT | Performed by: NURSE PRACTITIONER

## 2024-07-11 RX ORDER — SODIUM CHLORIDE 9 MG/ML
INJECTION, SOLUTION INTRAVENOUS CONTINUOUS
Status: ACTIVE | OUTPATIENT
Start: 2024-07-11 | End: 2024-07-11

## 2024-07-11 RX ORDER — HYDROMORPHONE HYDROCHLORIDE 2 MG/ML
3 INJECTION, SOLUTION INTRAMUSCULAR; INTRAVENOUS; SUBCUTANEOUS EVERY 4 HOURS PRN
Status: DISCONTINUED | OUTPATIENT
Start: 2024-07-11 | End: 2024-07-12 | Stop reason: HOSPADM

## 2024-07-11 RX ADMIN — FLUOXETINE HYDROCHLORIDE 40 MG: 20 CAPSULE ORAL at 08:07

## 2024-07-11 RX ADMIN — Medication 10 ML: at 06:07

## 2024-07-11 RX ADMIN — LACTULOSE 30 G: 20 SOLUTION ORAL at 08:07

## 2024-07-11 RX ADMIN — SENNOSIDES AND DOCUSATE SODIUM 2 TABLET: 50; 8.6 TABLET ORAL at 08:07

## 2024-07-11 RX ADMIN — HYDROCODONE BITARTRATE AND ACETAMINOPHEN 1 TABLET: 7.5; 325 TABLET ORAL at 02:07

## 2024-07-11 RX ADMIN — APIXABAN 5 MG: 5 TABLET, FILM COATED ORAL at 08:07

## 2024-07-11 RX ADMIN — SODIUM CHLORIDE: 9 INJECTION, SOLUTION INTRAVENOUS at 08:07

## 2024-07-11 RX ADMIN — TIZANIDINE 4 MG: 4 TABLET ORAL at 03:07

## 2024-07-11 RX ADMIN — CARVEDILOL 25 MG: 25 TABLET, FILM COATED ORAL at 08:07

## 2024-07-11 RX ADMIN — HYDROMORPHONE HYDROCHLORIDE 2 MG: 1 INJECTION, SOLUTION INTRAMUSCULAR; INTRAVENOUS; SUBCUTANEOUS at 12:07

## 2024-07-11 RX ADMIN — HYDROMORPHONE HYDROCHLORIDE 3 MG: 2 INJECTION INTRAMUSCULAR; INTRAVENOUS; SUBCUTANEOUS at 11:07

## 2024-07-11 RX ADMIN — DIPHENHYDRAMINE HYDROCHLORIDE 25 MG: 25 CAPSULE ORAL at 02:07

## 2024-07-11 RX ADMIN — DIPHENHYDRAMINE HYDROCHLORIDE 25 MG: 25 CAPSULE ORAL at 08:07

## 2024-07-11 RX ADMIN — PROMETHAZINE HYDROCHLORIDE 25 MG: 12.5 TABLET ORAL at 09:07

## 2024-07-11 RX ADMIN — HYDROMORPHONE HYDROCHLORIDE 2 MG: 1 INJECTION, SOLUTION INTRAMUSCULAR; INTRAVENOUS; SUBCUTANEOUS at 04:07

## 2024-07-11 RX ADMIN — PROMETHAZINE HYDROCHLORIDE 25 MG: 12.5 TABLET ORAL at 08:07

## 2024-07-11 RX ADMIN — HYDROCODONE BITARTRATE AND ACETAMINOPHEN 1 TABLET: 7.5; 325 TABLET ORAL at 08:07

## 2024-07-11 RX ADMIN — HYDROMORPHONE HYDROCHLORIDE 3 MG: 2 INJECTION INTRAMUSCULAR; INTRAVENOUS; SUBCUTANEOUS at 09:07

## 2024-07-11 RX ADMIN — TIZANIDINE 4 MG: 4 TABLET ORAL at 08:07

## 2024-07-11 RX ADMIN — PROMETHAZINE HYDROCHLORIDE 25 MG: 12.5 TABLET ORAL at 02:07

## 2024-07-11 RX ADMIN — HYDROMORPHONE HYDROCHLORIDE 3 MG: 2 INJECTION INTRAMUSCULAR; INTRAVENOUS; SUBCUTANEOUS at 08:07

## 2024-07-11 RX ADMIN — AMLODIPINE BESYLATE 10 MG: 10 TABLET ORAL at 08:07

## 2024-07-11 RX ADMIN — Medication 10 ML: at 12:07

## 2024-07-11 RX ADMIN — POTASSIUM CHLORIDE 20 MEQ: 1500 TABLET, EXTENDED RELEASE ORAL at 08:07

## 2024-07-11 RX ADMIN — HYDROMORPHONE HYDROCHLORIDE 3 MG: 2 INJECTION INTRAMUSCULAR; INTRAVENOUS; SUBCUTANEOUS at 03:07

## 2024-07-11 RX ADMIN — Medication 10 ML: at 11:07

## 2024-07-11 NOTE — ASSESSMENT & PLAN NOTE
- Interval history and physical exam findings as described above  - History of Acute Chest Syndrome and Pulmonary Embolisim  - Dilaudid q4h prn  - Supportive IVF provided  - PRN zofran for nausea  - Bowel regimen provided  - Will continue to monitor

## 2024-07-11 NOTE — HOSPITAL COURSE
Please see progress notes from Dr. Acosta and Dr. Baltazar Barth for hospital course.     I, Dr. Nixon, assumed care of patient on 7/11. Patient presented with sickle cell pain crisis c/b staph epidermis bacteremia. ID consulted and patient completed course of 14 days of Vancomycin. Hospital course also c/b unresponsivemess concerning for seizures and stepped up to MICU for airway watch. EEG negative for seizures and neurology recommending no initiation of AED's. Patient stepped down and continued to have sickle cell pain which was managed with PRN IV dilaudid. Patient's pain improved and patient stable for discharge 7/12. Patient seen and evaluated on day of discharge. Pt deemed appropriate for discharge. Plan discussed with pt, who was agreeable and amenable; medications were discussed and reviewed, outpatient follow-up scheduled, ER precautions were given, all questions were answered to the pt's satisfaction, and patient was subsequently discharged.

## 2024-07-11 NOTE — PROGRESS NOTES
[facility] ([address] Medicine  Progress Note    Patient Name: Nazanin Malone  MRN: [id number]  Patient Class: IP- Inpatient   Admission Date: 6/26/2024  Length of Stay: 14 days  Attending Physician: Aftab Nixon MD  Primary Care Provider: Pee Montgomery MD        Subjective:     Principal Problem:Sickle-cell disease with pain        HPI:  44 y.o. female presenting with chest pain and leg pain for about 3 days.  History of acute chest syndrome secondary to sickle cell anemia.  History of sickle cell beta thalassemia.  Has had multiple acute chest syndrome, pneumonia.  Also history of pulmonary embolism but has been off of her Eliquis for several months due to insurance related issues.  Does not take folic acid or hydroxyurea either.  Has a port in the right side of her chest, history of needing exchange transfusions.  Fairly significant history of acute chest syndrome, rhabdomyolysis resulting in fasciotomy of the right lower extremity, just recently as of last year October 20, 2023.  Recently moved here from [place], drove here 3 days ago.  No new leg swelling or muscle pains.    Overview/Hospital Course:  Please see progress notes from Dr. Acosta and Dr. Baltazar Barth for hospital course.     I, Dr. Nixon, assumed care of patient on 7/11. Patient presented with sickle cell pain crisis c/b staph epidermis bacteremia. ID consulted and patient completed course of 14 days of Vancomycin. Hospital course also c/b unresponsivemess concerning for seizures and stepped up to MICU for airway watch. EEG negative for seizures and neurology recommending no initiation of AED's.    Interval History: Pt seen and examined this morning on thea KINGSLEY. Reports new and worsening LUE pain which started last night consistent with prior sickle cell pain. No other complaints. Care plan reviewed. Otherwise, doing well and with no further complaints at this time.        Objective:     Vital Signs (Most  Recent):  Temp: 98.4 °F (36.9 °C) (07/11/24 1201)  Pulse: 70 (07/11/24 1201)  Resp: 18 (07/11/24 1201)  BP: 124/73 (07/11/24 1201)  SpO2: 96 % (07/11/24 1201) Vital Signs (24h Range):  Temp:  [98 °F (36.7 °C)-99.2 °F (37.3 °C)] 98.4 °F (36.9 °C)  Pulse:  [66-87] 70  Resp:  [16-20] 18  SpO2:  [95 %-98 %] 96 %  BP: (118-145)/(73-85) 124/73     Weight: 85.7 kg (188 lb 15 oz)  Body mass index is 34.56 kg/m².    Intake/Output Summary (Last 24 hours) at 7/11/2024 1228  Last data filed at 7/11/2024 0225  Gross per 24 hour   Intake --   Output 1650 ml   Net -1650 ml         Physical Exam  Gen: in NAD, appears stated age  Neuro: AAOx4, CN2-12 grossly intact BL; motor, sensory, and strength grossly intact BL  HEENT: NTNC, EOMI, PERRLA, MMM; no thyromegaly or lymphadenopathy; no JVD appreciated  CVS: RRR, no m/r/g; S1/S2 auscultated with no S3 or S4; capillary refill < 2 sec  Resp: lungs CTAB, no w/r/r; no belabored breathing or accessory muscle use appreciated   Abd: BS+ in all 4 quadrants; NTND, soft to palpation; no organomegaly appreciated   Extrem: LUE pain, pulses full, equal, and regular over all 4 extremities; no UE or LE edema BL          Significant Labs: All pertinent labs within the past 24 hours have been reviewed.    Significant Imaging: I have reviewed all pertinent imaging results/findings within the past 24 hours.    Assessment/Plan:      * Sickle-cell disease with pain  - Interval history and physical exam findings as described above  - History of Acute Chest Syndrome and Pulmonary Embolisim  - Dilaudid q4h prn  - Supportive IVF provided  - PRN zofran for nausea  - Bowel regimen provided  - Will continue to monitor    Encephalopathy  - Resolved    Bacteremia  - Blood cultures with staph epidermis bacteremia 4/4  - ID consulted   -  Recommending 14 days of Vancomycin.  - S/p 14 day course of Vanc    Hypokalemia  Patient has hypokalemia which is Acute and currently uncontrolled. Most recent potassium levels  reviewed-   Lab Results   Component Value Date    K 3.0 (L) 06/26/2024   . Will continue potassium replacement per protocol and recheck repeat levels after replacement completed.     History of pulmonary embolism  CTA today: 1. Examination is essentially nondiagnostic for assessment of the pulmonary arteries beyond the level of the main right and left pulmonary arteries due to suboptimal contrast bolus timing and respiratory motion.  2. No definite acute large central/saddle-type embolus.  Off of Elequis  Will restart Eliquis at 5mg BID, low threshold to increase to treatment dose.      Intermittent asthma  Stable      Anxiety        Trigeminal neuralgia  Complains of mild pain      Iron deficiency anemia  Patient's anemia is currently controlled. Has not received any PRBCs to date. Etiology likely d/t chronic blood loss  Current CBC reviewed-   Lab Results   Component Value Date    HGB 12.1 06/26/2024    HCT 40 06/26/2024     Monitor serial CBC and transfuse if patient becomes hemodynamically unstable, symptomatic or H/H drops below 7/21.      VTE Risk Mitigation (From admission, onward)           Ordered     apixaban tablet 5 mg  2 times daily         06/26/24 1846     IP VTE HIGH RISK PATIENT  Once         06/26/24 1846     Place sequential compression device  Until discontinued         06/26/24 1846                    Discharge Planning   ALYSE: 7/12/2024     Code Status: Full Code   Is the patient medically ready for discharge?:     Reason for patient still in hospital (select all that apply): Treatment  Discharge Plan A: Home with family   Discharge Delays: (!) Post-Acute Set-up              Aftab Nixon MD  Department of Hospital Medicine   [facility] ([place]

## 2024-07-11 NOTE — PROGRESS NOTES
Pharmacokinetic Assessment Follow Up: IV Vancomycin    Therapy with vancomycin complete and/or consult discontinued by provider. Pharmacy will sign off, please re-consult as needed.    Brittney Damon, ShariD

## 2024-07-11 NOTE — SUBJECTIVE & OBJECTIVE
Interval History: Pt seen and examined this morning on wallaceDelano KINGSLEY. Reports new and worsening LUE pain which started last night consistent with prior sickle cell pain. No other complaints. Care plan reviewed. Otherwise, doing well and with no further complaints at this time.        Objective:     Vital Signs (Most Recent):  Temp: 98.4 °F (36.9 °C) (07/11/24 1201)  Pulse: 70 (07/11/24 1201)  Resp: 18 (07/11/24 1201)  BP: 124/73 (07/11/24 1201)  SpO2: 96 % (07/11/24 1201) Vital Signs (24h Range):  Temp:  [98 °F (36.7 °C)-99.2 °F (37.3 °C)] 98.4 °F (36.9 °C)  Pulse:  [66-87] 70  Resp:  [16-20] 18  SpO2:  [95 %-98 %] 96 %  BP: (118-145)/(73-85) 124/73     Weight: 85.7 kg (188 lb 15 oz)  Body mass index is 34.56 kg/m².    Intake/Output Summary (Last 24 hours) at 7/11/2024 1228  Last data filed at 7/11/2024 0225  Gross per 24 hour   Intake --   Output 1650 ml   Net -1650 ml         Physical Exam  Gen: in NAD, appears stated age  Neuro: AAOx4, CN2-12 grossly intact BL; motor, sensory, and strength grossly intact BL  HEENT: NTNC, EOMI, PERRLA, MMM; no thyromegaly or lymphadenopathy; no JVD appreciated  CVS: RRR, no m/r/g; S1/S2 auscultated with no S3 or S4; capillary refill < 2 sec  Resp: lungs CTAB, no w/r/r; no belabored breathing or accessory muscle use appreciated   Abd: BS+ in all 4 quadrants; NTND, soft to palpation; no organomegaly appreciated   Extrem: LUE pain, pulses full, equal, and regular over all 4 extremities; no UE or LE edema BL          Significant Labs: All pertinent labs within the past 24 hours have been reviewed.    Significant Imaging: I have reviewed all pertinent imaging results/findings within the past 24 hours.

## 2024-07-11 NOTE — ASSESSMENT & PLAN NOTE
- Blood cultures with staph epidermis bacteremia 4/4  - ID consulted   -  Recommending 14 days of Vancomycin.  - S/p 14 day course of Vanc

## 2024-07-12 VITALS
HEIGHT: 62 IN | TEMPERATURE: 99 F | SYSTOLIC BLOOD PRESSURE: 138 MMHG | RESPIRATION RATE: 17 BRPM | WEIGHT: 188.94 LBS | DIASTOLIC BLOOD PRESSURE: 80 MMHG | BODY MASS INDEX: 34.77 KG/M2 | OXYGEN SATURATION: 96 % | HEART RATE: 73 BPM

## 2024-07-12 PROCEDURE — 25000003 PHARM REV CODE 250: Performed by: INTERNAL MEDICINE

## 2024-07-12 PROCEDURE — 63600175 PHARM REV CODE 636 W HCPCS: Performed by: INTERNAL MEDICINE

## 2024-07-12 PROCEDURE — A4216 STERILE WATER/SALINE, 10 ML: HCPCS | Performed by: STUDENT IN AN ORGANIZED HEALTH CARE EDUCATION/TRAINING PROGRAM

## 2024-07-12 PROCEDURE — 25000003 PHARM REV CODE 250

## 2024-07-12 PROCEDURE — 25000003 PHARM REV CODE 250: Performed by: STUDENT IN AN ORGANIZED HEALTH CARE EDUCATION/TRAINING PROGRAM

## 2024-07-12 RX ORDER — FOLIC ACID 1 MG/1
1 TABLET ORAL DAILY
Qty: 60 TABLET | Refills: 0 | Status: SHIPPED | OUTPATIENT
Start: 2024-07-12 | End: 2024-09-10

## 2024-07-12 RX ORDER — AMOXICILLIN 250 MG
1 CAPSULE ORAL DAILY PRN
COMMUNITY
Start: 2024-07-12

## 2024-07-12 RX ORDER — TIZANIDINE 4 MG/1
4 TABLET ORAL EVERY 8 HOURS PRN
Qty: 30 TABLET | Refills: 0 | Status: SHIPPED | OUTPATIENT
Start: 2024-07-12 | End: 2024-07-30

## 2024-07-12 RX ORDER — MORPHINE SULFATE 15 MG/1
15 TABLET, FILM COATED, EXTENDED RELEASE ORAL 2 TIMES DAILY
Qty: 60 TABLET | Refills: 0 | Status: SHIPPED | OUTPATIENT
Start: 2024-07-12

## 2024-07-12 RX ORDER — AMLODIPINE BESYLATE 10 MG/1
10 TABLET ORAL DAILY
Qty: 90 TABLET | Refills: 0 | Status: SHIPPED | OUTPATIENT
Start: 2024-07-12

## 2024-07-12 RX ORDER — CARVEDILOL 25 MG/1
25 TABLET ORAL 2 TIMES DAILY
Qty: 180 TABLET | Refills: 2 | Status: SHIPPED | OUTPATIENT
Start: 2024-07-12

## 2024-07-12 RX ORDER — FLUOXETINE HYDROCHLORIDE 40 MG/1
40 CAPSULE ORAL DAILY
Qty: 90 CAPSULE | Refills: 1 | Status: SHIPPED | OUTPATIENT
Start: 2024-07-12

## 2024-07-12 RX ORDER — PROMETHAZINE HYDROCHLORIDE 25 MG/1
25 TABLET ORAL EVERY 6 HOURS PRN
Qty: 30 TABLET | Refills: 2 | Status: SHIPPED | OUTPATIENT
Start: 2024-07-12

## 2024-07-12 RX ORDER — OXYCODONE AND ACETAMINOPHEN 10; 325 MG/1; MG/1
1 TABLET ORAL EVERY 6 HOURS PRN
Qty: 50 TABLET | Refills: 0 | Status: SHIPPED | OUTPATIENT
Start: 2024-07-12

## 2024-07-12 RX ADMIN — HYDROCODONE BITARTRATE AND ACETAMINOPHEN 1 TABLET: 7.5; 325 TABLET ORAL at 04:07

## 2024-07-12 RX ADMIN — DIPHENHYDRAMINE HYDROCHLORIDE 25 MG: 25 CAPSULE ORAL at 05:07

## 2024-07-12 RX ADMIN — Medication 10 ML: at 06:07

## 2024-07-12 RX ADMIN — HYDROMORPHONE HYDROCHLORIDE 3 MG: 2 INJECTION INTRAMUSCULAR; INTRAVENOUS; SUBCUTANEOUS at 05:07

## 2024-07-12 RX ADMIN — FLUOXETINE HYDROCHLORIDE 40 MG: 20 CAPSULE ORAL at 09:07

## 2024-07-12 RX ADMIN — SENNOSIDES AND DOCUSATE SODIUM 2 TABLET: 50; 8.6 TABLET ORAL at 09:07

## 2024-07-12 RX ADMIN — PROMETHAZINE HYDROCHLORIDE 25 MG: 12.5 TABLET ORAL at 11:07

## 2024-07-12 RX ADMIN — TIZANIDINE 4 MG: 4 TABLET ORAL at 09:07

## 2024-07-12 RX ADMIN — Medication 10 ML: at 05:07

## 2024-07-12 RX ADMIN — HYDROCODONE BITARTRATE AND ACETAMINOPHEN 1 TABLET: 7.5; 325 TABLET ORAL at 11:07

## 2024-07-12 RX ADMIN — PROMETHAZINE HYDROCHLORIDE 25 MG: 12.5 TABLET ORAL at 05:07

## 2024-07-12 RX ADMIN — HYDROMORPHONE HYDROCHLORIDE 3 MG: 2 INJECTION INTRAMUSCULAR; INTRAVENOUS; SUBCUTANEOUS at 01:07

## 2024-07-12 RX ADMIN — Medication 10 ML: at 12:07

## 2024-07-12 RX ADMIN — AMLODIPINE BESYLATE 10 MG: 10 TABLET ORAL at 09:07

## 2024-07-12 RX ADMIN — TIZANIDINE 4 MG: 4 TABLET ORAL at 05:07

## 2024-07-12 RX ADMIN — HYDROMORPHONE HYDROCHLORIDE 3 MG: 2 INJECTION INTRAMUSCULAR; INTRAVENOUS; SUBCUTANEOUS at 09:07

## 2024-07-12 RX ADMIN — DIPHENHYDRAMINE HYDROCHLORIDE 25 MG: 25 CAPSULE ORAL at 09:07

## 2024-07-12 RX ADMIN — CARVEDILOL 25 MG: 25 TABLET, FILM COATED ORAL at 09:07

## 2024-07-12 RX ADMIN — APIXABAN 5 MG: 5 TABLET, FILM COATED ORAL at 09:07

## 2024-07-12 RX ADMIN — POTASSIUM CHLORIDE 20 MEQ: 1500 TABLET, EXTENDED RELEASE ORAL at 09:07

## 2024-07-12 RX ADMIN — TIZANIDINE 4 MG: 4 TABLET ORAL at 12:07

## 2024-07-12 NOTE — NURSING
PICC line removed per hospital policy.  Pt verbalized understanding of discharge paperwork and denied any questions or concerns.  Pt was escorted off the unit by patient transport with all personal belongings/medications in hand.  NADN; DASH

## 2024-07-12 NOTE — DISCHARGE SUMMARY
[facility] ([place] [address])  [facility]  Discharge Summary      Patient Name: Nazanin Malone  MRN: [id number]  VLADIMIR: [id number]  Patient Class: IP- Inpatient  Admission Date: 6/26/2024  Hospital Length of Stay: 15 days  Discharge Date and Time:  07/12/2024 11:05 AM  Attending Physician: Aftab Nixon MD   Discharging Provider: Aftab Nixon MD  Primary Care Provider: Pee Montgomery MD  [facility]: [facility] Aftab Nixon MD  Primary Care Team: [facility]    HPI:   44 y.o. female presenting with chest pain and leg pain for about 3 days.  History of acute chest syndrome secondary to sickle cell anemia.  History of sickle cell beta thalassemia.  Has had multiple acute chest syndrome, pneumonia.  Also history of pulmonary embolism but has been off of her Eliquis for several months due to insurance related issues.  Does not take folic acid or hydroxyurea either.  Has a port in the right side of her chest, history of needing exchange transfusions.  Fairly significant history of acute chest syndrome, rhabdomyolysis resulting in fasciotomy of the right lower extremity, just recently as of last year October 20, 2023.  Recently moved here from [place], drove here 3 days ago.  No new leg swelling or muscle pains.    * No surgery found *      Hospital Course:   Please see progress notes from Dr. Acosta and Dr. Baltazar Barth for hospital course.     I, Dr. Nixon, assumed care of patient on 7/11. Patient presented with sickle cell pain crisis c/b staph epidermis bacteremia. ID consulted and patient completed course of 14 days of Vancomycin. Hospital course also c/b unresponsivemess concerning for seizures and stepped up to MICU for airway watch. EEG negative for seizures and neurology recommending no initiation of AED's. Patient stepped down and continued to have sickle cell pain which was managed with PRN IV dilaudid. Patient's pain improved and patient stable for discharge  "7/12. Patient seen and evaluated on day of discharge. Pt deemed appropriate for discharge. Plan discussed with pt, who was agreeable and amenable; medications were discussed and reviewed, outpatient follow-up scheduled, ER precautions were given, all questions were answered to the pt's satisfaction, and patient was subsequently discharged.       Goals of Care Treatment Preferences:  Code Status: Full Code      Consults:   Consults (From admission, onward)          Status Ordering Provider     [facility]  Once        Provider:  (Not yet assigned)    Completed ANABELL BOWENS     Inpatient Consult to Medical Toxicology  Once        Provider:  (Not yet assigned)    Completed WAYNE BRAND     Inpatient consult to Vascular (Stroke) Neurology  Once        Provider:  (Not yet assigned)    Completed VANESSA MUKHERJEE     Inpatient consult to Neurology  Once        Provider:  (Not yet assigned)    Completed VANESSA MUKHERJEE     Inpatient consult to Midline team  Once        Provider:  (Not yet assigned)    Completed GAGE BARBOSA     Inpatient consult to PICC team ([facility])  Once        Provider:  (Not yet assigned)    Completed VANESSA MUKHERJEE     Inpatient consult to Infectious Diseases  Once        Provider:  (Not yet assigned)    Completed VANESSA MUKHERJEE     Pharmacy to dose Vancomycin consult  Once        Provider:  (Not yet assigned)   Placed in "And" Linked Group    Acknowledged VANESSA MUKHERJEE     Inpatient consult to Hematology/Oncology  Once        Provider:  (Not yet assigned)    Completed COLETTE NY     Inpatient consult to Social Work/Case Management  Once        Provider:  (Not yet assigned)    Completed DAVIDA RUIZ  Bacteremia  - Blood cultures with staph epidermis bacteremia 4/4  - ID consulted   -  Recommending 14 days of Vancomycin.  - S/p 14 day course of Vanc      Final Active Diagnoses:    Diagnosis Date Noted POA    PRINCIPAL " PROBLEM:  Sickle-cell disease with pain [D57.00] 04/16/2017 Yes    Tegretol toxicity [T42.1X1A] 07/03/2024 Yes    FLOR (acute kidney injury) [N17.9] 07/02/2024 No    Encephalopathy [G93.40] 07/02/2024 No    At risk for ineffective clearance of airway [Z91.89] 07/02/2024 Not Applicable    Bacteremia [R78.81] 06/28/2024 Yes    History of pulmonary embolism [Z86.711] 06/08/2020 Yes    Intermittent asthma [J45.20] 04/26/2019 Yes    Hypertension [I10] 04/16/2017 Yes    Iron deficiency anemia [D50.9] 02/21/2017 Yes      Problems Resolved During this Admission:       Discharged Condition: good    Disposition: Home or Self Care    Follow Up:   Follow-up Information       Pee Montgomery MD Follow up in 3 day(s).    Specialty: Hematology and Oncology  Contact information:  [facility]  [facility] [id number] [phone]               Vijaya Soni MD Follow up.    Specialty: Hematology and Oncology  Why: CHW called to schedule pcp f/uJesse (rep) sent message to clinic to call the patient to schedule appt.  Contact information:  [address]  [facility] [id number] [phone]                           Patient Instructions:   No discharge procedures on file.    Significant Diagnostic Studies: N/A    Pending Diagnostic Studies:       Procedure Component Value Units Date/Time    EKG 12-lead [[id number]]     Order Status: Sent Lab Status: No result     EKG 12-lead [[id number]]     Order Status: Sent Lab Status: No result     EKG 12-lead [[id number]]     Order Status: Sent Lab Status: No result     EKG 12-lead [[id number]]     Order Status: Sent Lab Status: No result     Toxicology screen, urine [[id number]] Collected: 07/02/24 1809    Order Status: Sent Lab Status: In process Updated: 07/02/24 1810    Specimen: Urine            Medications:  Reconciled Home Medications:      Medication List        CHANGE how you take these medications      amLODIPine 10 MG tablet  Commonly known as: NORVASC  Take 1 tablet  (10 mg total) by mouth once daily.  What changed: when to take this     apixaban 5 mg Tab  Commonly known as: ELIQUIS  Take 1 tablet (5 mg total) by mouth 2 (two) times daily.  What changed: how much to take     FLUoxetine 40 MG capsule  Take 1 capsule (40 mg total) by mouth once daily.  What changed: when to take this            CONTINUE taking these medications      acetaminophen 500 MG tablet  Commonly known as: TYLENOL  Take 1,000 mg by mouth daily as needed for Pain.     albuterol 90 mcg/actuation inhaler  Commonly known as: VENTOLIN HFA  Inhale 2 puffs into the lungs every 6 (six) hours as needed for Wheezing or Shortness of Breath. Rescue     carvediloL 25 MG tablet  Commonly known as: COREG  Take 1 tablet (25 mg total) by mouth 2 (two) times daily.     fluticasone propionate 50 mcg/actuation nasal spray  Commonly known as: FLONASE  INSTILL 1 SPRAY INTO EACH NOSTRIL EVERY DAY     folic acid 1 MG tablet  Commonly known as: FOLVITE  Take 1 tablet (1 mg total) by mouth once daily.     morphine 15 MG 12 hr tablet  Commonly known as: MS CONTIN  Take 1 tablet (15 mg total) by mouth 2 (two) times daily.     oxyCODONE-acetaminophen  mg per tablet  Commonly known as: PERCOCET  Take 1 tablet by mouth every 6 (six) hours as needed for Pain.     promethazine 25 MG tablet  Commonly known as: PHENERGAN  Take 1 tablet (25 mg total) by mouth every 6 (six) hours as needed for Nausea.     senna-docusate 8.6-50 mg 8.6-50 mg per tablet  Commonly known as: PERICOLACE  Take 1 tablet by mouth daily as needed for Constipation.     VITAMIN C ORAL  Take 1 capsule by mouth once daily.            STOP taking these medications      carBAMazepine 200 mg tablet  Commonly known as: TEGRETOL     ENDARI 5 gram Pwpk  Generic drug: glutamine (sickle cell)     hydroCHLOROthiazide 25 MG tablet  Commonly known as: HYDRODIURIL     hydroxyurea 500 mg Cap  Commonly known as: HYDREA     ondansetron 8 MG tablet  Commonly known as: ZOFRAN      potassium chloride SA 20 MEQ tablet  Commonly known as: K-DUR,KLOR-CON     pregabalin 200 MG Cap  Commonly known as: LYRICA              Indwelling Lines/Drains at time of discharge:   Lines/Drains/Airways       Peripherally Inserted Central Catheter Line  Duration             PICC Double Lumen 07/04/24 1347 right basilic 7 days              Drain  Duration             Female External Urinary Catheter w/ Suction 07/05/24 7 days                    Time spent on the discharge of patient: 35 minutes         Aftab Nixon MD  Department of Hospital Medicine  [facility] ([place]-[facility])

## 2024-07-12 NOTE — PLAN OF CARE
Problem: Adult Inpatient Plan of Care  Goal: Plan of Care Review  Outcome: Progressing  Goal: Patient-Specific Goal (Individualized)  Outcome: Progressing  Goal: Absence of Hospital-Acquired Illness or Injury  Outcome: Progressing  Goal: Optimal Comfort and Wellbeing  Outcome: Progressing  Goal: Readiness for Transition of Care  Outcome: Progressing     Problem: Skin Injury Risk Increased  Goal: Skin Health and Integrity  Outcome: Progressing     Problem: Sickle Cell Disease  Goal: Optimal Pain Control and Function  Outcome: Progressing

## 2024-07-12 NOTE — PLAN OF CARE
Problem: Adult Inpatient Plan of Care  Goal: Plan of Care Review  Outcome: Met  Goal: Patient-Specific Goal (Individualized)  Outcome: Met  Goal: Absence of Hospital-Acquired Illness or Injury  Outcome: Met  Goal: Optimal Comfort and Wellbeing  Outcome: Met  Goal: Readiness for Transition of Care  Outcome: Met     Problem: Pneumonia  Goal: Fluid Balance  Outcome: Met  Goal: Resolution of Infection Signs and Symptoms  Outcome: Met  Goal: Effective Oxygenation and Ventilation  Outcome: Met     Problem: Fall Injury Risk  Goal: Absence of Fall and Fall-Related Injury  Outcome: Met     Problem: Acute Kidney Injury/Impairment  Goal: Fluid and Electrolyte Balance  Outcome: Met  Goal: Improved Oral Intake  Outcome: Met  Goal: Effective Renal Function  Outcome: Met     Problem: Infection  Goal: Absence of Infection Signs and Symptoms  Outcome: Met     Problem: Skin Injury Risk Increased  Goal: Skin Health and Integrity  Outcome: Met

## 2024-07-15 NOTE — PLAN OF CARE
[facility] - Med Surg ([facility])  Discharge Final Note    Observed d/c orders placed for this patient. No post-acute needs noted. Patient d/c to home w/family via private vehicle.    Primary Care Provider: Pee Montgomery MD    Expected Discharge Date: 7/12/2024    Final Discharge Note (most recent)       Final Note - 07/15/24 0855          Final Note    Assessment Type Final Discharge Note     Anticipated Discharge Disposition Home or Self Care     What phone number can be called within the next 1-3 days to see how you are doing after discharge? [phone] (P)         Post-Acute Status    Post-Acute Authorization Other (P)      Other Status No Post-Acute Service Needs (P)      Discharge Delays None known at this time (P)                      Important Message from Medicare  Important Message from Medicare regarding Discharge Appeal Rights: Other (comments) (CHW attempted to call patient and omarter (no answer))     Date IMM was signed: 07/10/24  Time IMM was signed: 1409    Contact Info       Pee Montgomery MD   Specialty: Hematology and Oncology   Relationship: PCP - General    [address] [address]   Phone: [phone]       Next Steps: Follow up in 3 day(s)    Vijaya Soni MD   Specialty: Hematology and Oncology    [address] [id number]   Phone: [phone]       Next Steps: Follow up    Instructions: CHW called to schedule pcp f/u, Jesse (rep) sent message to clinic to call the patient to schedule appt.          Chelsea Warren, VENKATESH, LMSW    Case Management Department  [facility]

## 2024-07-16 NOTE — SUBJECTIVE & OBJECTIVE
"Interval History: Still requiring IV pain medicines    Review of Systems  Objective:     Vital Signs (Most Recent):  Temp: 99 °F (37.2 °C) (07/12/24 1159)  Pulse: 73 (07/12/24 1459)  Resp: 17 (07/12/24 1728)  BP: 138/80 (07/12/24 1159)  SpO2: 96 % (07/12/24 1159) Vital Signs (24h Range):        Weight: 85.7 kg (188 lb 15 oz)  Body mass index is 34.56 kg/m².  No intake or output data in the 24 hours ending 07/16/24 0951      Physical Exam  Vitals and nursing note reviewed.   Constitutional:       General: She is not in acute distress.     Appearance: She is ill-appearing.   HENT:      Head: Normocephalic.      Mouth/Throat:      Mouth: Mucous membranes are moist.      Pharynx: Oropharynx is clear.   Eyes:      Pupils: Pupils are equal, round, and reactive to light.   Cardiovascular:      Rate and Rhythm: Normal rate.      Pulses: Normal pulses.   Pulmonary:      Effort: Pulmonary effort is normal. No respiratory distress.      Breath sounds: Normal breath sounds.   Abdominal:      General: Abdomen is flat.      Palpations: Abdomen is soft.   Musculoskeletal:      Left upper arm: Swelling present.      Cervical back: Normal range of motion.      Right lower leg: No edema.      Left lower leg: Edema present.      Comments: Left lower arm swelling   Skin:     General: Skin is warm and dry.      Capillary Refill: Capillary refill takes less than 2 seconds.   Neurological:      General: No focal deficit present.      Mental Status: She is alert. Mental status is at baseline.   Psychiatric:         Mood and Affect: Mood normal.             Significant Labs: All pertinent labs within the past 24 hours have been reviewed.  BMP: No results for input(s): "GLU", "NA", "K", "CL", "CO2", "BUN", "CREATININE", "CALCIUM", "MG" in the last 48 hours.    Significant Imaging: I have reviewed all pertinent imaging results/findings within the past 24 hours.  "

## 2024-07-16 NOTE — PROGRESS NOTES
[facility] ([address] Medicine  Progress Note    Patient Name: Nazanin Malone  MRN: [id number]  Patient Class: IP- Inpatient   Admission Date: 6/26/2024  Length of Stay: 15 days  Attending Physician: No att. providers found  Primary Care Provider: Pee Montgomery MD        Subjective:     Principal Problem:Sickle-cell disease with pain        HPI:  44 y.o. female presenting with chest pain and leg pain for about 3 days.  History of acute chest syndrome secondary to sickle cell anemia.  History of sickle cell beta thalassemia.  Has had multiple acute chest syndrome, pneumonia.  Also history of pulmonary embolism but has been off of her Eliquis for several months due to insurance related issues.  Does not take folic acid or hydroxyurea either.  Has a port in the right side of her chest, history of needing exchange transfusions.  Fairly significant history of acute chest syndrome, rhabdomyolysis resulting in fasciotomy of the right lower extremity, just recently as of last year October 20, 2023.  Recently moved here from [place], drove here 3 days ago.  No new leg swelling or muscle pains.    Overview/Hospital Course:  Please see progress notes from Dr. Acosta and Dr. Baltazar Barth for hospital course.     I, Dr. Nixon, assumed care of patient on 7/11. Patient presented with sickle cell pain crisis c/b staph epidermis bacteremia. ID consulted and patient completed course of 14 days of Vancomycin. Hospital course also c/b unresponsivemess concerning for seizures and stepped up to MICU for airway watch. EEG negative for seizures and neurology recommending no initiation of AED's. Patient stepped down and continued to have sickle cell pain which was managed with PRN IV dilaudid. Patient's pain improved and patient stable for discharge 7/12. Patient seen and evaluated on day of discharge. Pt deemed appropriate for discharge. Plan discussed with pt, who was agreeable and amenable; medications  "were discussed and reviewed, outpatient follow-up scheduled, ER precautions were given, all questions were answered to the pt's satisfaction, and patient was subsequently discharged.      Interval History: Still requiring IV pain medicines    Review of Systems  Objective:     Vital Signs (Most Recent):  Temp: 99 °F (37.2 °C) (07/12/24 1159)  Pulse: 73 (07/12/24 1459)  Resp: 17 (07/12/24 1728)  BP: 138/80 (07/12/24 1159)  SpO2: 96 % (07/12/24 1159) Vital Signs (24h Range):        Weight: 85.7 kg (188 lb 15 oz)  Body mass index is 34.56 kg/m².  No intake or output data in the 24 hours ending 07/16/24 0951      Physical Exam  Vitals and nursing note reviewed.   Constitutional:       General: She is not in acute distress.     Appearance: She is ill-appearing.   HENT:      Head: Normocephalic.      Mouth/Throat:      Mouth: Mucous membranes are moist.      Pharynx: Oropharynx is clear.   Eyes:      Pupils: Pupils are equal, round, and reactive to light.   Cardiovascular:      Rate and Rhythm: Normal rate.      Pulses: Normal pulses.   Pulmonary:      Effort: Pulmonary effort is normal. No respiratory distress.      Breath sounds: Normal breath sounds.   Abdominal:      General: Abdomen is flat.      Palpations: Abdomen is soft.   Musculoskeletal:      Left upper arm: Swelling present.      Cervical back: Normal range of motion.      Right lower leg: No edema.      Left lower leg: Edema present.      Comments: Left lower arm swelling   Skin:     General: Skin is warm and dry.      Capillary Refill: Capillary refill takes less than 2 seconds.   Neurological:      General: No focal deficit present.      Mental Status: She is alert. Mental status is at baseline.   Psychiatric:         Mood and Affect: Mood normal.             Significant Labs: All pertinent labs within the past 24 hours have been reviewed.  BMP: No results for input(s): "GLU", "NA", "K", "CL", "CO2", "BUN", "CREATININE", "CALCIUM", "MG" in the last 48 " hours.    Significant Imaging: I have reviewed all pertinent imaging results/findings within the past 24 hours.    Assessment/Plan:      * Sickle-cell disease with pain  - Interval history and physical exam findings as described above  - History of Acute Chest Syndrome and Pulmonary Embolisim  - Dilaudid q4h prn  - Supportive IVF provided  - PRN zofran for nausea  - Bowel regimen provided  - Will continue to monitor    Encephalopathy  - Resolved    Bacteremia  - Blood cultures with staph epidermis bacteremia 4/4  - ID consulted   -  Recommending 14 days of Vancomycin.  - S/p 14 day course of Vanc    Hypokalemia  Patient has hypokalemia which is Acute and currently uncontrolled. Most recent potassium levels reviewed-   Lab Results   Component Value Date    K 3.0 (L) 06/26/2024   . Will continue potassium replacement per protocol and recheck repeat levels after replacement completed.     History of pulmonary embolism  CTA today: 1. Examination is essentially nondiagnostic for assessment of the pulmonary arteries beyond the level of the main right and left pulmonary arteries due to suboptimal contrast bolus timing and respiratory motion.  2. No definite acute large central/saddle-type embolus.  Off of Elequis  Will restart Eliquis at 5mg BID, low threshold to increase to treatment dose.      Intermittent asthma  Stable      Anxiety        Trigeminal neuralgia  Complains of mild pain      Iron deficiency anemia  Patient's anemia is currently controlled. Has not received any PRBCs to date. Etiology likely d/t chronic blood loss  Current CBC reviewed-   Lab Results   Component Value Date    HGB 12.1 06/26/2024    HCT 40 06/26/2024     Monitor serial CBC and transfuse if patient becomes hemodynamically unstable, symptomatic or H/H drops below 7/21.      VTE Risk Mitigation (From admission, onward)           Ordered     IP VTE HIGH RISK PATIENT  Once         06/26/24 1846                    Discharge Planning   ALYSE:  7/12/2024     Code Status: Prior   Is the patient medically ready for discharge?:     Reason for patient still in hospital (select all that apply): Patient unstable  Discharge Plan A: Home with family   Discharge Delays: None known at this time              Baltazar Barth MD  Department of Hospital Medicine   [facility] ([place]-[facility])

## 2024-07-28 ENCOUNTER — HOSPITAL ENCOUNTER (INPATIENT)
Facility: HOSPITAL | Age: 44
LOS: 19 days | Discharge: HOME | DRG: 982 | End: 2024-08-16
Attending: EMERGENCY MEDICINE | Admitting: INTERNAL MEDICINE
Payer: MEDICARE

## 2024-07-28 DIAGNOSIS — D57.00 SICKLE CELL ANEMIA WITH CRISIS: ICD-10-CM

## 2024-07-28 DIAGNOSIS — E87.6 HYPOKALEMIA: ICD-10-CM

## 2024-07-28 DIAGNOSIS — D57.00 HB-SS DISEASE WITH VASO-OCCLUSIVE PAIN: ICD-10-CM

## 2024-07-28 DIAGNOSIS — D57.00 SICKLE CELL PAIN CRISIS: Primary | ICD-10-CM

## 2024-07-28 DIAGNOSIS — R07.9 CHEST PAIN, UNSPECIFIED TYPE: ICD-10-CM

## 2024-07-28 DIAGNOSIS — R07.9 CHEST PAIN: ICD-10-CM

## 2024-07-28 DIAGNOSIS — I65.22: ICD-10-CM

## 2024-07-28 LAB
ALBUMIN SERPL BCP-MCNC: 4 G/DL (ref 3.5–5.2)
ALLENS TEST: ABNORMAL
ALP SERPL-CCNC: 94 U/L (ref 55–135)
ALT SERPL W/O P-5'-P-CCNC: 14 U/L (ref 10–44)
ANION GAP SERPL CALC-SCNC: 10 MMOL/L (ref 8–16)
AST SERPL-CCNC: 22 U/L (ref 10–40)
BASOPHILS # BLD AUTO: 0.04 K/UL (ref 0–0.2)
BASOPHILS NFR BLD: 0.4 % (ref 0–1.9)
BILIRUB SERPL-MCNC: 0.7 MG/DL (ref 0.1–1)
BILIRUB UR QL STRIP: NEGATIVE
BNP SERPL-MCNC: 41 PG/ML (ref 0–99)
BUN SERPL-MCNC: 10 MG/DL (ref 6–20)
CALCIUM SERPL-MCNC: 9.9 MG/DL (ref 8.7–10.5)
CHLORIDE SERPL-SCNC: 109 MMOL/L (ref 95–110)
CK SERPL-CCNC: 95 U/L (ref 20–180)
CLARITY UR REFRACT.AUTO: CLEAR
CO2 SERPL-SCNC: 22 MMOL/L (ref 23–29)
COLOR UR AUTO: YELLOW
CREAT SERPL-MCNC: 1.3 MG/DL (ref 0.5–1.4)
DIFFERENTIAL METHOD BLD: ABNORMAL
EOSINOPHIL # BLD AUTO: 0.1 K/UL (ref 0–0.5)
EOSINOPHIL NFR BLD: 1 % (ref 0–8)
ERYTHROCYTE [DISTWIDTH] IN BLOOD BY AUTOMATED COUNT: 18.4 % (ref 11.5–14.5)
EST. GFR  (NO RACE VARIABLE): 51.4 ML/MIN/1.73 M^2
GLUCOSE SERPL-MCNC: 103 MG/DL (ref 70–110)
GLUCOSE UR QL STRIP: NEGATIVE
HCT VFR BLD AUTO: 35.1 % (ref 37–48.5)
HGB BLD-MCNC: 11.6 G/DL (ref 12–16)
HGB UR QL STRIP: NEGATIVE
IMM GRANULOCYTES # BLD AUTO: 0.02 K/UL (ref 0–0.04)
IMM GRANULOCYTES NFR BLD AUTO: 0.2 % (ref 0–0.5)
KETONES UR QL STRIP: NEGATIVE
LDH SERPL L TO P-CCNC: 0.39 MMOL/L (ref 0.5–2.2)
LEUKOCYTE ESTERASE UR QL STRIP: NEGATIVE
LYMPHOCYTES # BLD AUTO: 2.2 K/UL (ref 1–4.8)
LYMPHOCYTES NFR BLD: 22.7 % (ref 18–48)
MAGNESIUM SERPL-MCNC: 1.8 MG/DL (ref 1.6–2.6)
MCH RBC QN AUTO: 24.4 PG (ref 27–31)
MCHC RBC AUTO-ENTMCNC: 33 G/DL (ref 32–36)
MCV RBC AUTO: 74 FL (ref 82–98)
MONOCYTES # BLD AUTO: 0.7 K/UL (ref 0.3–1)
MONOCYTES NFR BLD: 7.6 % (ref 4–15)
NEUTROPHILS # BLD AUTO: 6.6 K/UL (ref 1.8–7.7)
NEUTROPHILS NFR BLD: 68.1 % (ref 38–73)
NITRITE UR QL STRIP: NEGATIVE
NRBC BLD-RTO: 1 /100 WBC
OHS QRS DURATION: 96 MS
OHS QTC CALCULATION: 459 MS
PH UR STRIP: 6 [PH] (ref 5–8)
PLATELET # BLD AUTO: 376 K/UL (ref 150–450)
PMV BLD AUTO: 10.8 FL (ref 9.2–12.9)
POTASSIUM SERPL-SCNC: 3.2 MMOL/L (ref 3.5–5.1)
PROCALCITONIN SERPL IA-MCNC: 0.02 NG/ML
PROT SERPL-MCNC: 8.3 G/DL (ref 6–8.4)
PROT UR QL STRIP: ABNORMAL
RBC # BLD AUTO: 4.75 M/UL (ref 4–5.4)
RETICS/RBC NFR AUTO: 0.7 % (ref 0.5–2.5)
SAMPLE: ABNORMAL
SARS-COV-2 RDRP RESP QL NAA+PROBE: NEGATIVE
SITE: ABNORMAL
SODIUM SERPL-SCNC: 141 MMOL/L (ref 136–145)
SP GR UR STRIP: 1.01 (ref 1–1.03)
TROPONIN I SERPL DL<=0.01 NG/ML-MCNC: <0.006 NG/ML (ref 0–0.03)
URN SPEC COLLECT METH UR: ABNORMAL
WBC # BLD AUTO: 9.73 K/UL (ref 3.9–12.7)

## 2024-07-28 PROCEDURE — 25000003 PHARM REV CODE 250: Performed by: EMERGENCY MEDICINE

## 2024-07-28 PROCEDURE — 21400001 HC TELEMETRY ROOM

## 2024-07-28 PROCEDURE — 82550 ASSAY OF CK (CPK): CPT | Performed by: EMERGENCY MEDICINE

## 2024-07-28 PROCEDURE — 83605 ASSAY OF LACTIC ACID: CPT

## 2024-07-28 PROCEDURE — 96375 TX/PRO/DX INJ NEW DRUG ADDON: CPT

## 2024-07-28 PROCEDURE — 63600175 PHARM REV CODE 636 W HCPCS

## 2024-07-28 PROCEDURE — 93010 ELECTROCARDIOGRAM REPORT: CPT | Mod: ,,, | Performed by: INTERNAL MEDICINE

## 2024-07-28 PROCEDURE — 96376 TX/PRO/DX INJ SAME DRUG ADON: CPT

## 2024-07-28 PROCEDURE — 85025 COMPLETE CBC W/AUTO DIFF WBC: CPT | Performed by: EMERGENCY MEDICINE

## 2024-07-28 PROCEDURE — 25000003 PHARM REV CODE 250

## 2024-07-28 PROCEDURE — 99900035 HC TECH TIME PER 15 MIN (STAT)

## 2024-07-28 PROCEDURE — 81003 URINALYSIS AUTO W/O SCOPE: CPT | Performed by: INTERNAL MEDICINE

## 2024-07-28 PROCEDURE — 84145 PROCALCITONIN (PCT): CPT | Performed by: EMERGENCY MEDICINE

## 2024-07-28 PROCEDURE — 63600175 PHARM REV CODE 636 W HCPCS: Performed by: EMERGENCY MEDICINE

## 2024-07-28 PROCEDURE — 96374 THER/PROPH/DIAG INJ IV PUSH: CPT

## 2024-07-28 PROCEDURE — 87040 BLOOD CULTURE FOR BACTERIA: CPT | Mod: 59 | Performed by: EMERGENCY MEDICINE

## 2024-07-28 PROCEDURE — 99285 EMERGENCY DEPT VISIT HI MDM: CPT | Mod: 25

## 2024-07-28 PROCEDURE — 25000003 PHARM REV CODE 250: Performed by: INTERNAL MEDICINE

## 2024-07-28 PROCEDURE — 83735 ASSAY OF MAGNESIUM: CPT | Performed by: EMERGENCY MEDICINE

## 2024-07-28 PROCEDURE — 96361 HYDRATE IV INFUSION ADD-ON: CPT

## 2024-07-28 PROCEDURE — 80053 COMPREHEN METABOLIC PANEL: CPT | Performed by: EMERGENCY MEDICINE

## 2024-07-28 PROCEDURE — 83880 ASSAY OF NATRIURETIC PEPTIDE: CPT | Performed by: EMERGENCY MEDICINE

## 2024-07-28 PROCEDURE — 85045 AUTOMATED RETICULOCYTE COUNT: CPT | Performed by: EMERGENCY MEDICINE

## 2024-07-28 PROCEDURE — 93005 ELECTROCARDIOGRAM TRACING: CPT

## 2024-07-28 PROCEDURE — 84484 ASSAY OF TROPONIN QUANT: CPT | Performed by: EMERGENCY MEDICINE

## 2024-07-28 PROCEDURE — U0002 COVID-19 LAB TEST NON-CDC: HCPCS | Performed by: EMERGENCY MEDICINE

## 2024-07-28 RX ORDER — TALC
6 POWDER (GRAM) TOPICAL NIGHTLY PRN
Status: DISCONTINUED | OUTPATIENT
Start: 2024-07-28 | End: 2024-08-16 | Stop reason: HOSPADM

## 2024-07-28 RX ORDER — HYDROXYZINE PAMOATE 25 MG/1
50 CAPSULE ORAL EVERY 8 HOURS PRN
Status: DISCONTINUED | OUTPATIENT
Start: 2024-07-28 | End: 2024-07-30

## 2024-07-28 RX ORDER — OXYCODONE AND ACETAMINOPHEN 10; 325 MG/1; MG/1
1 TABLET ORAL EVERY 4 HOURS PRN
Status: DISCONTINUED | OUTPATIENT
Start: 2024-07-28 | End: 2024-08-01

## 2024-07-28 RX ORDER — FOLIC ACID 1 MG/1
1 TABLET ORAL DAILY
Status: DISCONTINUED | OUTPATIENT
Start: 2024-07-28 | End: 2024-08-16 | Stop reason: HOSPADM

## 2024-07-28 RX ORDER — SODIUM CHLORIDE 0.9 % (FLUSH) 0.9 %
10 SYRINGE (ML) INJECTION
Status: DISCONTINUED | OUTPATIENT
Start: 2024-07-28 | End: 2024-08-16 | Stop reason: HOSPADM

## 2024-07-28 RX ORDER — HYDROMORPHONE HYDROCHLORIDE 1 MG/ML
2 INJECTION, SOLUTION INTRAMUSCULAR; INTRAVENOUS; SUBCUTANEOUS
Status: COMPLETED | OUTPATIENT
Start: 2024-07-28 | End: 2024-07-28

## 2024-07-28 RX ORDER — PROMETHAZINE HYDROCHLORIDE 12.5 MG/1
25 TABLET ORAL EVERY 6 HOURS PRN
Status: DISCONTINUED | OUTPATIENT
Start: 2024-07-28 | End: 2024-08-16 | Stop reason: HOSPADM

## 2024-07-28 RX ORDER — PROCHLORPERAZINE EDISYLATE 5 MG/ML
2.5 INJECTION INTRAMUSCULAR; INTRAVENOUS
Status: COMPLETED | OUTPATIENT
Start: 2024-07-28 | End: 2024-07-28

## 2024-07-28 RX ORDER — OXYCODONE AND ACETAMINOPHEN 10; 325 MG/1; MG/1
1 TABLET ORAL EVERY 6 HOURS PRN
Status: DISCONTINUED | OUTPATIENT
Start: 2024-07-28 | End: 2024-07-28

## 2024-07-28 RX ORDER — GLUCAGON 1 MG
1 KIT INJECTION
Status: DISCONTINUED | OUTPATIENT
Start: 2024-07-28 | End: 2024-08-16 | Stop reason: HOSPADM

## 2024-07-28 RX ORDER — ONDANSETRON 8 MG/1
8 TABLET, ORALLY DISINTEGRATING ORAL EVERY 8 HOURS PRN
Status: DISCONTINUED | OUTPATIENT
Start: 2024-07-28 | End: 2024-08-03

## 2024-07-28 RX ORDER — GABAPENTIN 300 MG/1
600 CAPSULE ORAL NIGHTLY
Status: DISCONTINUED | OUTPATIENT
Start: 2024-07-28 | End: 2024-08-16 | Stop reason: HOSPADM

## 2024-07-28 RX ORDER — MORPHINE SULFATE 15 MG/1
15 TABLET, FILM COATED, EXTENDED RELEASE ORAL 2 TIMES DAILY
Status: DISCONTINUED | OUTPATIENT
Start: 2024-07-28 | End: 2024-08-02

## 2024-07-28 RX ORDER — BUTALBITAL, ACETAMINOPHEN AND CAFFEINE 50; 325; 40 MG/1; MG/1; MG/1
1 TABLET ORAL EVERY 4 HOURS PRN
Status: DISCONTINUED | OUTPATIENT
Start: 2024-07-28 | End: 2024-08-16 | Stop reason: HOSPADM

## 2024-07-28 RX ORDER — IBUPROFEN 200 MG
24 TABLET ORAL
Status: DISCONTINUED | OUTPATIENT
Start: 2024-07-28 | End: 2024-08-16 | Stop reason: HOSPADM

## 2024-07-28 RX ORDER — TIZANIDINE 4 MG/1
4 TABLET ORAL EVERY 8 HOURS PRN
Status: DISCONTINUED | OUTPATIENT
Start: 2024-07-28 | End: 2024-08-16 | Stop reason: HOSPADM

## 2024-07-28 RX ORDER — DIPHENHYDRAMINE HYDROCHLORIDE 50 MG/ML
25 INJECTION INTRAMUSCULAR; INTRAVENOUS
Status: COMPLETED | OUTPATIENT
Start: 2024-07-28 | End: 2024-07-28

## 2024-07-28 RX ORDER — AMLODIPINE BESYLATE 10 MG/1
10 TABLET ORAL DAILY
Status: DISCONTINUED | OUTPATIENT
Start: 2024-07-29 | End: 2024-08-16 | Stop reason: HOSPADM

## 2024-07-28 RX ORDER — ACETAMINOPHEN 325 MG/1
650 TABLET ORAL EVERY 4 HOURS PRN
Status: DISCONTINUED | OUTPATIENT
Start: 2024-07-28 | End: 2024-07-30

## 2024-07-28 RX ORDER — FLUOXETINE HYDROCHLORIDE 20 MG/1
40 CAPSULE ORAL DAILY
Status: DISCONTINUED | OUTPATIENT
Start: 2024-07-29 | End: 2024-08-16 | Stop reason: HOSPADM

## 2024-07-28 RX ORDER — HYDROMORPHONE HYDROCHLORIDE 1 MG/ML
3 INJECTION, SOLUTION INTRAMUSCULAR; INTRAVENOUS; SUBCUTANEOUS EVERY 4 HOURS PRN
Status: DISCONTINUED | OUTPATIENT
Start: 2024-07-28 | End: 2024-07-31

## 2024-07-28 RX ORDER — POLYETHYLENE GLYCOL 3350 17 G/17G
17 POWDER, FOR SOLUTION ORAL DAILY PRN
Status: DISCONTINUED | OUTPATIENT
Start: 2024-07-28 | End: 2024-08-05

## 2024-07-28 RX ORDER — HYDROMORPHONE HYDROCHLORIDE 1 MG/ML
1 INJECTION, SOLUTION INTRAMUSCULAR; INTRAVENOUS; SUBCUTANEOUS
Status: COMPLETED | OUTPATIENT
Start: 2024-07-28 | End: 2024-07-28

## 2024-07-28 RX ORDER — CARVEDILOL 25 MG/1
25 TABLET ORAL 2 TIMES DAILY
Status: DISCONTINUED | OUTPATIENT
Start: 2024-07-28 | End: 2024-08-16 | Stop reason: HOSPADM

## 2024-07-28 RX ORDER — IPRATROPIUM BROMIDE AND ALBUTEROL SULFATE 2.5; .5 MG/3ML; MG/3ML
3 SOLUTION RESPIRATORY (INHALATION) EVERY 4 HOURS PRN
Status: DISCONTINUED | OUTPATIENT
Start: 2024-07-28 | End: 2024-08-16 | Stop reason: HOSPADM

## 2024-07-28 RX ORDER — IBUPROFEN 200 MG
16 TABLET ORAL
Status: DISCONTINUED | OUTPATIENT
Start: 2024-07-28 | End: 2024-08-16 | Stop reason: HOSPADM

## 2024-07-28 RX ORDER — BISACODYL 10 MG/1
10 SUPPOSITORY RECTAL DAILY PRN
Status: DISCONTINUED | OUTPATIENT
Start: 2024-07-28 | End: 2024-08-16 | Stop reason: HOSPADM

## 2024-07-28 RX ORDER — SODIUM CHLORIDE, SODIUM LACTATE, POTASSIUM CHLORIDE, CALCIUM CHLORIDE 600; 310; 30; 20 MG/100ML; MG/100ML; MG/100ML; MG/100ML
INJECTION, SOLUTION INTRAVENOUS CONTINUOUS
Status: ACTIVE | OUTPATIENT
Start: 2024-07-28 | End: 2024-07-30

## 2024-07-28 RX ADMIN — SODIUM CHLORIDE 1000 ML: 9 INJECTION, SOLUTION INTRAVENOUS at 08:07

## 2024-07-28 RX ADMIN — MORPHINE SULFATE 15 MG: 15 TABLET, EXTENDED RELEASE ORAL at 10:07

## 2024-07-28 RX ADMIN — POTASSIUM BICARBONATE 25 MEQ: 978 TABLET, EFFERVESCENT ORAL at 09:07

## 2024-07-28 RX ADMIN — FOLIC ACID 1 MG: 1 TABLET ORAL at 12:07

## 2024-07-28 RX ADMIN — PROCHLORPERAZINE EDISYLATE 2.5 MG: 5 INJECTION INTRAMUSCULAR; INTRAVENOUS at 09:07

## 2024-07-28 RX ADMIN — HYDROMORPHONE HYDROCHLORIDE 3 MG: 1 INJECTION, SOLUTION INTRAMUSCULAR; INTRAVENOUS; SUBCUTANEOUS at 05:07

## 2024-07-28 RX ADMIN — HYDROMORPHONE HYDROCHLORIDE 2 MG: 1 INJECTION, SOLUTION INTRAMUSCULAR; INTRAVENOUS; SUBCUTANEOUS at 11:07

## 2024-07-28 RX ADMIN — CARVEDILOL 25 MG: 25 TABLET, FILM COATED ORAL at 10:07

## 2024-07-28 RX ADMIN — HYDROXYZINE PAMOATE 50 MG: 25 CAPSULE ORAL at 05:07

## 2024-07-28 RX ADMIN — PROMETHAZINE HYDROCHLORIDE 25 MG: 12.5 TABLET ORAL at 10:07

## 2024-07-28 RX ADMIN — BUTALBITAL, ACETAMINOPHEN, AND CAFFEINE 1 TABLET: 50; 325; 40 TABLET ORAL at 01:07

## 2024-07-28 RX ADMIN — HYDROMORPHONE HYDROCHLORIDE 2 MG: 1 INJECTION, SOLUTION INTRAMUSCULAR; INTRAVENOUS; SUBCUTANEOUS at 09:07

## 2024-07-28 RX ADMIN — DIPHENHYDRAMINE HYDROCHLORIDE 25 MG: 50 INJECTION, SOLUTION INTRAMUSCULAR; INTRAVENOUS at 08:07

## 2024-07-28 RX ADMIN — OXYCODONE HYDROCHLORIDE AND ACETAMINOPHEN 1 TABLET: 10; 325 TABLET ORAL at 04:07

## 2024-07-28 RX ADMIN — GABAPENTIN 600 MG: 300 CAPSULE ORAL at 10:07

## 2024-07-28 RX ADMIN — MORPHINE SULFATE 15 MG: 15 TABLET, EXTENDED RELEASE ORAL at 12:07

## 2024-07-28 RX ADMIN — SODIUM CHLORIDE, POTASSIUM CHLORIDE, SODIUM LACTATE AND CALCIUM CHLORIDE: 600; 310; 30; 20 INJECTION, SOLUTION INTRAVENOUS at 12:07

## 2024-07-28 RX ADMIN — HYDROMORPHONE HYDROCHLORIDE 1 MG: 1 INJECTION, SOLUTION INTRAMUSCULAR; INTRAVENOUS; SUBCUTANEOUS at 08:07

## 2024-07-28 RX ADMIN — PROCHLORPERAZINE EDISYLATE 2.5 MG: 5 INJECTION INTRAMUSCULAR; INTRAVENOUS at 08:07

## 2024-07-28 RX ADMIN — HYDROMORPHONE HYDROCHLORIDE 3 MG: 1 INJECTION, SOLUTION INTRAMUSCULAR; INTRAVENOUS; SUBCUTANEOUS at 01:07

## 2024-07-28 RX ADMIN — APIXABAN 5 MG: 5 TABLET, FILM COATED ORAL at 10:07

## 2024-07-28 RX ADMIN — OXYCODONE HYDROCHLORIDE AND ACETAMINOPHEN 1 TABLET: 10; 325 TABLET ORAL at 09:07

## 2024-07-28 RX ADMIN — PROMETHAZINE HYDROCHLORIDE 25 MG: 12.5 TABLET ORAL at 01:07

## 2024-07-28 RX ADMIN — TIZANIDINE 4 MG: 2 TABLET ORAL at 05:07

## 2024-07-28 RX ADMIN — HYDROMORPHONE HYDROCHLORIDE 3 MG: 1 INJECTION, SOLUTION INTRAMUSCULAR; INTRAVENOUS; SUBCUTANEOUS at 10:07

## 2024-07-28 RX ADMIN — SODIUM CHLORIDE, POTASSIUM CHLORIDE, SODIUM LACTATE AND CALCIUM CHLORIDE 1000 ML: 600; 310; 30; 20 INJECTION, SOLUTION INTRAVENOUS at 11:07

## 2024-07-28 NOTE — SUBJECTIVE & OBJECTIVE
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     Current Outpatient Medications on File Prior to Encounter   Medication Sig    acetaminophen (TYLENOL) 500 MG tablet Take 1,000 mg by mouth daily as needed for Pain.    albuterol (VENTOLIN HFA) 90 mcg/actuation inhaler Inhale 2 puffs into the lungs every 6 (six) hours as needed for Wheezing or Shortness of Breath. Rescue    amLODIPine (NORVASC) 10 MG tablet Take 1 tablet (10 mg total) by mouth once daily.    apixaban (ELIQUIS) 5 mg Tab Take 1 tablet (5 mg total) by mouth 2 (two) times daily.    ascorbic acid (VITAMIN C ORAL) Take 1 capsule by mouth once daily.    carvediloL (COREG) 25 MG tablet Take 1 tablet (25 mg total) by mouth 2 (two) times daily.    FLUoxetine 40 MG capsule Take 1 capsule (40 mg total) by mouth once daily.    fluticasone propionate (FLONASE) 50 mcg/actuation nasal spray INSTILL 1 SPRAY INTO EACH NOSTRIL EVERY DAY    folic acid (FOLVITE) 1 MG tablet Take 1 tablet (1 mg total) by mouth once daily.    morphine (MS CONTIN) 15 MG 12 hr tablet Take 1 tablet (15 mg total) by mouth 2 (two) times daily.    oxyCODONE-acetaminophen (PERCOCET)  mg per tablet Take 1 tablet by mouth every 6 (six) hours as needed for Pain.    promethazine (PHENERGAN) 25 MG tablet Take 1 tablet (25 mg  total) by mouth every 6 (six) hours as needed for Nausea.    senna-docusate 8.6-50 mg (PERICOLACE) 8.6-50 mg per tablet Take 1 tablet by mouth daily as needed for Constipation.    tiZANidine (ZANAFLEX) 4 MG tablet Take 1 tablet (4 mg total) by mouth every 8 (eight) hours as needed.     Family History       Problem Relation (Age of Onset)    Diabetes Mother    Heart disease Mother, Father          Tobacco Use    Smoking status: Never    Smokeless tobacco: Never   Substance and Sexual Activity    Alcohol use: No    Drug use: Yes     Types: Marijuana     Comment: periodically     Sexual activity: Not Currently     Birth control/protection: See Surgical Hx     ROS  General ROS: negative for weight loss, weight gain, fevers, chills, night sweats  ENT ROS: negative for epistaxis, headaches or sinus pain  Ophthalmic ROS: negative for blurry vision, decreased vision, double vision or itchy eyes  Cardiovascular ROS: See HPI  Respiratory ROS: See HPI  Gastrointestinal ROS: negative for abdominal pain, change in bowel habits, black or bloody stools, nausea, emesis, or bloating  Genito-Urinary ROS: negative for dysuria, trouble voiding, or hematuria  Neurological ROS: negative for TIA or stroke symptoms  Endocrine ROS: negative for hair pattern changes or unexpected weight changes  Musculoskeletal ROS: negative for muscle pain, weakness, joint pain or joint swelling  Skin: negative for rash, wounds, skin breakdown, or issues otherwise  Hematological and Lymphatic ROS: negative for bleeding, bruising, swollen lymph nodes  Psychological ROS: negative for anxiety or depression      Objective:     Vital Signs (Most Recent):  Temp: 99.1 °F (37.3 °C) (07/28/24 0730)  Pulse: 87 (07/28/24 1000)  Resp: 20 (07/28/24 1103)  BP: (!) 147/92 (07/28/24 1000)  SpO2: 97 % (07/28/24 1000) Vital Signs (24h Range):  Temp:  [99.1 °F (37.3 °C)] 99.1 °F (37.3 °C)  Pulse:  [86-92] 87  Resp:  [20] 20  SpO2:  [97 %-100 %] 97 %  BP: (121-157)/(82-94)  147/92     Weight: 93.7 kg (206 lb 9.1 oz)  Body mass index is 37.78 kg/m².     Physical Exam  Gen: in NAD, appears stated age  Neuro: AAOx4, CN2-12 grossly intact BL; motor, sensory, and strength grossly intact BL  HEENT: NTNC, EOMI, PERRLA, MMM; no thyromegaly or lymphadenopathy; no JVD appreciated  CVS: Chest pain with palpation. Right subclavian port. RRR, no m/r/g; S1/S2 auscultated with no S3 or S4; capillary refill < 2 sec  Resp: lungs CTAB, no w/r/r; no belabored breathing or accessory muscle use appreciated   Abd: BS+ in all 4 quadrants; NTND, soft to palpation; no organomegaly appreciated   Extrem: RLE scar, pulses full, equal, and regular over all 4 extremities; no UE or LE edema BL             Significant Labs: All pertinent labs within the past 24 hours have been reviewed.    Significant Imaging: I have reviewed all pertinent imaging results/findings within the past 24 hours..

## 2024-07-28 NOTE — ASSESSMENT & PLAN NOTE
Chronic, controlled. Latest blood pressure and vitals reviewed-     Temp:  [99.1 °F (37.3 °C)]   Pulse:  [86-92]   Resp:  [20]   BP: (121-157)/(82-94)   SpO2:  [97 %-100 %] .   Home meds for hypertension were reviewed and noted below.   Hypertension Medications               amLODIPine (NORVASC) 10 MG tablet Take 1 tablet (10 mg total) by mouth once daily.    carvediloL (COREG) 25 MG tablet Take 1 tablet (25 mg total) by mouth 2 (two) times daily.            While in the hospital, will manage blood pressure as follows; Continue home antihypertensive regimen    Will utilize p.r.n. blood pressure medication only if patient's blood pressure greater than 180/110 and she develops symptoms such as worsening chest pain or shortness of breath.

## 2024-07-28 NOTE — ED PROVIDER NOTES
"Encounter Date: 7/28/2024       History     Chief Complaint   Patient presents with    Multiple complaints     Dc'd 2 weeks ago with sepsis, has port, pain to L arm, chest, sob, fatigue , hx sickle cell     43 yo W with extensive pmhx including SC anemia with history of acute chest,  beta thalassemia, HTN, obesity, Fe deficiency anemia, PE on apixaban, RLE fasciotomy, recent hospitalization for bacteremia presents with a chief complaint of chest pain and shortness of breath.  Patient notes she has been suffering from pain in the left chest radiating diffusely down the left arm for the past 2-1/2 days.  Pain is constant and severe.  She has been using pain medications at home and actually ran out of oxycodone.  Despite use of opiates, she was not experienced improvement in symptoms.  She noticed that today she developed difficulty in breathing as well as chills and came to the ER.  Quality of chest pain as described "like I was punched in the chest.".  She endorses associated fatigue.  She notes that symptoms are consistent with previous acute chest. Patient most recently had acute chest 4 months ago in [place].  She was recently admitted at [facility] from June 26th to July 12th for sickle cell pain crisis complicated by staph epidermis bacteremia.  She completed 14 day course of IV vancomycin.  Hospital course was also complicated by unresponsiveness concerning for seizure and she was stepped up to the MICU for an airway watch.  EEG was negative for seizure and neurology recommended no AEDs.  Patient notes that is currently compliant with the apixaban for PE. Patient does not take folic acid or hydroxyurea.  She has a port in the right chest given history of exchange transfusions.          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    Asthma     Depression "     Hypertension     Morbid obesity     Opioid dependence 2017    Pneumonia due to Streptococcus pneumoniae 2017    Right lower lobe pneumonia 2017    Sepsis due to Streptococcus pneumoniae 2017    Sickle cell-beta thalassemia disease with pain     Trigeminal neuralgia      Past Surgical History:   Procedure Laterality Date     SECTION      TONSILLECTOMY      TUBAL LIGATION       Family History   Problem Relation Name Age of Onset    Heart disease Mother      Diabetes Mother      Heart disease Father      Breast cancer Neg Hx      Ovarian cancer Neg Hx      Colon cancer Neg Hx       Social History     Tobacco Use    Smoking status: Never    Smokeless tobacco: Never   Substance Use Topics    Alcohol use: No    Drug use: Yes     Types: Marijuana     Comment: periodically      Review of Systems    Physical Exam     Initial Vitals [24 0730]   BP Pulse Resp Temp SpO2   (!) 121/92 92 20 99.1 °F (37.3 °C) 100 %      MAP       --         Physical Exam    Nursing note and vitals reviewed.  Constitutional: She appears well-developed and well-nourished. She is not diaphoretic. No distress.   HENT:   Head: Normocephalic and atraumatic.   Eyes: Right eye exhibits no discharge. Left eye exhibits no discharge. No scleral icterus.   Neck: Neck supple. No JVD present.   Normal range of motion.  Cardiovascular:  Normal rate, regular rhythm and normal heart sounds.     Exam reveals no gallop and no friction rub.       No murmur heard.   2+ left radial pulse   Pulmonary/Chest: Breath sounds normal. No respiratory distress. She has no wheezes. She has no rhonchi. She has no rales. She exhibits tenderness (reproducible).    Right chest wall port   Abdominal: Abdomen is soft. She exhibits no distension and no mass. There is no abdominal tenderness. There is no rebound and no guarding.   Musculoskeletal:         General: Tenderness (diffuse tenderness palpation throughout left upper extremity) present. No  edema. Normal range of motion.      Cervical back: Normal range of motion and neck supple.      Comments:  Left upper extremity compartments are soft     Neurological: She is alert and oriented to person, place, and time. She has normal strength. No sensory deficit.   Skin: Skin is warm and dry. Capillary refill takes less than 2 seconds.    Right lower extremity fasciotomy scars   Psychiatric: Thought content normal.         ED Course   Procedures  Labs Reviewed   CBC W/ AUTO DIFFERENTIAL - Abnormal       Result Value    WBC 9.73      RBC 4.75      Hemoglobin 11.6 (*)     Hematocrit 35.1 (*)     MCV 74 (*)     MCH 24.4 (*)     MCHC 33.0      RDW 18.4 (*)     Platelets 376      MPV 10.8      Immature Granulocytes 0.2      Gran # (ANC) 6.6      Immature Grans (Abs) 0.02      Lymph # 2.2      Mono # 0.7      Eos # 0.1      Baso # 0.04      nRBC 1 (*)     Gran % 68.1      Lymph % 22.7      Mono % 7.6      Eosinophil % 1.0      Basophil % 0.4      Differential Method Automated     COMPREHENSIVE METABOLIC PANEL - Abnormal    Sodium 141      Potassium 3.2 (*)     Chloride 109      CO2 22 (*)     Glucose 103      BUN 10      Creatinine 1.3      Calcium 9.9      Total Protein 8.3      Albumin 4.0      Total Bilirubin 0.7      Alkaline Phosphatase 94      AST 22      ALT 14      eGFR 51.4 (*)     Anion Gap 10     ISTAT LACTATE - Abnormal    POC Lactate 0.39 (*)     Sample VENOUS      Site Other      Allens Test N/A     CULTURE, BLOOD   CULTURE, BLOOD   MAGNESIUM    Magnesium 1.8     B-TYPE NATRIURETIC PEPTIDE    BNP 41     TROPONIN I    Troponin I <0.006     PROCALCITONIN    Procalcitonin 0.02     CK    CPK 95     RETICULOCYTES    Retic 0.7     SARS-COV-2 RNA AMPLIFICATION, QUAL    SARS-CoV-2 RNA, Amplification, Qual Negative     URINALYSIS, REFLEX TO URINE CULTURE     EKG Readings: (Independently Interpreted)   Initial Reading: No STEMI. Rhythm: Normal Sinus Rhythm. Heart Rate: 89. ST Segments: Normal ST Segments. Axis:  Normal.     ECG Results              EKG 12-lead (Final result)        Collection Time Result Time QRS Duration OHS QTC Calculation    07/28/24 07:33:16 07/28/24 09:21:04 96 459                     Final result by Interface, Lab In [facility] (07/28/24 09:21:05)                   Narrative:    Test Reason : R68.89,    Vent. Rate : 089 BPM     Atrial Rate : 089 BPM     P-R Int : 164 ms          QRS Dur : 096 ms      QT Int : 378 ms       P-R-T Axes : 048 020 002 degrees     QTc Int : 459 ms    Normal sinus rhythm  Nonspecific T wave abnormality  Abnormal ECG  When compared with ECG of 02-JUL-2024 21:23,  Nonspecific T wave abnormality now evident in Anterior leads  Confirmed by Abdi ESTRELLA MD ([id number]) on 7/28/2024 9:20:58 AM    Referred By:             Confirmed By:Abdi ESTRELLA MD                                  Imaging Results              X-Ray Chest AP Portable (Final result)  Result time 07/28/24 09:55:45      Final result by Reggie Rodrigues MD (07/28/24 09:55:45)                   Impression:      Question mild pulmonary vascular congestion.    No acute appearing focal airspace consolidation.      Electronically signed by: Reggie Rodrigues  Date:    07/28/2024  Time:    09:55               Narrative:    EXAMINATION:  XR CHEST AP PORTABLE    CLINICAL HISTORY:  chest pain, shortness of breath, chills;    TECHNIQUE:  Single frontal view of the chest was performed.    COMPARISON:  Chest radiograph performed 07/05/2024, 11:04 hours.    FINDINGS:  Right PICC has been discontinued.  Unchanged position of other bilateral central venous catheters.    Similar cardiomediastinal contours.    Linear platelike opacity at the left lateral lung base may reflect atelectasis and/or scar.    Question minor pulmonary vascular congestion.    Otherwise, no definite focal airspace consolidation is seen.    No definite pneumothorax or large volume pleural effusion.    No acute findings in the visualized abdomen.    Osseous and soft tissue  structures appear without definite acute change.                                       Medications   lactated ringers bolus 1,000 mL (has no administration in time range)   HYDROmorphone injection 2 mg (has no administration in time range)   diphenhydrAMINE injection 25 mg (25 mg Intravenous Given 7/28/24 0830)   prochlorperazine injection Soln 2.5 mg (2.5 mg Intravenous Given 7/28/24 0831)   HYDROmorphone injection 1 mg (1 mg Intravenous Given 7/28/24 0831)   sodium chloride 0.9% bolus 1,000 mL 1,000 mL (0 mLs Intravenous Stopped 7/28/24 0953)   HYDROmorphone injection 2 mg (2 mg Intravenous Given 7/28/24 0903)   potassium bicarbonate disintegrating tablet 25 mEq (25 mEq Oral Given 7/28/24 0954)   prochlorperazine injection Soln 2.5 mg (2.5 mg Intravenous Given 7/28/24 0953)   HYDROmorphone injection 2 mg (2 mg Intravenous Given 7/28/24 0953)     Medical Decision Making  45 yo W with extensive pmhx including SC anemia with history of acute chest,  beta thalassemia, HTN, obesity, Fe deficiency anemia, PE on apixaban, RLE fasciotomy, recent hospitalization for bacteremia presents with a chief complaint of chest pain , shortness of breath, chills.      Differential includes, but is not limited to: Acute chest, pneumonia, ACS, CHF, COVID, sickle cell pain crisis, anemia      Well appearance and reassuring hemodynamics are inconsistent with cardiac tamponade or aortic dissection.  She reports compliance with apixaban, doubt acute PE.     Will administer IV fluids, analgesics, antiemetics.  Will obtain labs and chest x-ray.      Reassessment: Patient notes pain persists in his severe, will administer additional analgesics.      Reassessment: Lactic acid normal.  COVID negative.  CBC without leukocytosis.  Hemoglobin increased from baseline at 11.6, possibly hemoconcentration? .  MCV remains low at 74.  CMP with hypokalemia of 3.2, oral replacement ordered.  Magnesium normal.  BNP normal and troponin normal.  CPK normal.   Reticulocyte count normal.  On repeat assessment, patient notes pain and nausea persists.  Will re-dose analgesics and antiemetics.      Reassessment:  Procalcitonin normal.  Chest x-ray radiology read with possible mild pulmonary vascular congestion but no focal consolidation.  On my evaluation I do not appreciate any consolidation either.  Given lack of chest x-ray infiltrate, reassuring blood work and EKG, I do not see any convincing signs of acute chest syndrome.  Presentation is most consistent with sickle cell pain crisis.  On repeat evaluation she appears a bit more comfortable but notes pain persists.  Will  administer additional fluids and analgesics.  Will admit for sickle cell pain crisis.    Amount and/or Complexity of Data Reviewed  Labs: ordered.  Radiology: ordered.    Risk  Prescription drug management.                                      Clinical Impression:  Final diagnoses:  [R07.9] Chest pain, unspecified type  [E87.6] Hypokalemia  [D57.00] Sickle cell pain crisis (Primary)          ED Disposition Condition    Admit Stable                Taras Torres MD  07/28/24 2315

## 2024-07-28 NOTE — HPI
Nazanin Malone is a 44 year old female with history of SC anemia with history of acute chest, beta thalassemia, HTN, obesity, iron deficiency anemia, hx of PE on apixaban, RLE fasciotomy, and recent hospitalization for bacteremia s/p antibiotic course and recent sickle cell pain crisis that presents with sickle cell pain crisis. Patient reports recurrent pain consistent with her prior sickle cell pain crisis. Her pain started 2-3 days ago which started mostly in her left chest. Pain was constant without relief despite taking her home opiate regimen. Yesterday, she noted pain in her left arm and some shortness of breath. She reports a cough with yellow sputum and felt like she was oscillating between feeling hot and cold as well as low appetite associated with nausea. Denies F/C, abdominal pain, constipation.    Of note, had recent hospitalization for staph epidermis bacteremia s/p 14 day course of IV vancomycin.    Upon arrival to the ED, patient afebrile, HDS, and saturating well on room air. Labs notable for WBC 9, Hgb 11.5 (higher than baseline), K 3.2, trop negative, BNP WNL, blood cultures x2 sent. EKG without ischemic changes. CXR without consolidation.

## 2024-07-28 NOTE — ED TRIAGE NOTES
Multiple complaints (Dc'd 2 weeks ago with sepsis, has port, pain to L arm, chest, sob, fatigue , hx sickle cell)

## 2024-07-28 NOTE — H&P
[facility] - Emergency Dept  [facility]  History & Physical    Patient Name: Nazanin Malone  MRN: [id number]  Patient Class: IP- Inpatient  Admission Date: 7/28/2024  Attending Physician: Aftab Nixon MD   Primary Care Provider: Pee Montgomery MD         Patient information was obtained from patient and ER records.     Subjective:     Principal Problem:Sickle cell pain crisis    Chief Complaint:   Chief Complaint   Patient presents with    Multiple complaints     Dc'd 2 weeks ago with sepsis, has port, pain to L arm, chest, sob, fatigue , hx sickle cell        HPI: Nazanin Malone is a 44 year old female with history of SC anemia with history of acute chest, beta thalassemia, HTN, obesity, iron deficiency anemia, hx of PE on apixaban, RLE fasciotomy, and recent hospitalization for bacteremia s/p antibiotic course and recent sickle cell pain crisis that presents with sickle cell pain crisis. Patient reports recurrent pain consistent with her prior sickle cell pain crisis. Her pain started 2-3 days ago which started mostly in her left chest. Pain was constant without relief despite taking her home opiate regimen. Yesterday, she noted pain in her left arm and some shortness of breath. She reports a cough with yellow sputum and felt like she was oscillating between feeling hot and cold as well as low appetite associated with nausea. Denies F/C, abdominal pain, constipation.    Of note, had recent hospitalization for staph epidermis bacteremia s/p 14 day course of IV vancomycin.    Upon arrival to the ED, patient afebrile, HDS, and saturating well on room air. Labs notable for WBC 9, Hgb 11.5 (higher than baseline), K 3.2, trop negative, BNP WNL, blood cultures x2 sent. EKG without ischemic changes. CXR without consolidation.     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    Asthma      Depression     Hypertension     Morbid obesity     Opioid dependence 2017    Pneumonia due to Streptococcus pneumoniae 2017    Right lower lobe pneumonia 2017    Sepsis due to Streptococcus pneumoniae 2017    Sickle cell-beta thalassemia disease with pain     Trigeminal neuralgia        Past Surgical History:   Procedure Laterality Date     SECTION      TONSILLECTOMY      TUBAL LIGATION         Review of patient's allergies indicates:  No Known Allergies    No current facility-administered medications on file prior to encounter.     Current Outpatient Medications on File Prior to Encounter   Medication Sig    acetaminophen (TYLENOL) 500 MG tablet Take 1,000 mg by mouth daily as needed for Pain.    albuterol (VENTOLIN HFA) 90 mcg/actuation inhaler Inhale 2 puffs into the lungs every 6 (six) hours as needed for Wheezing or Shortness of Breath. Rescue    amLODIPine (NORVASC) 10 MG tablet Take 1 tablet (10 mg total) by mouth once daily.    apixaban (ELIQUIS) 5 mg Tab Take 1 tablet (5 mg total) by mouth 2 (two) times daily.    ascorbic acid (VITAMIN C ORAL) Take 1 capsule by mouth once daily.    carvediloL (COREG) 25 MG tablet Take 1 tablet (25 mg total) by mouth 2 (two) times daily.    FLUoxetine 40 MG capsule Take 1 capsule (40 mg total) by mouth once daily.    fluticasone propionate (FLONASE) 50 mcg/actuation nasal spray INSTILL 1 SPRAY INTO EACH NOSTRIL EVERY DAY    folic acid (FOLVITE) 1 MG tablet Take 1 tablet (1 mg total) by mouth once daily.    morphine (MS CONTIN) 15 MG 12 hr tablet Take 1 tablet (15 mg total) by mouth 2 (two) times daily.    oxyCODONE-acetaminophen (PERCOCET)  mg per tablet Take 1 tablet by mouth every 6 (six) hours as needed for Pain.    promethazine (PHENERGAN) 25 MG tablet Take 1 tablet (25 mg total) by mouth every 6 (six) hours as needed for Nausea.    senna-docusate 8.6-50 mg (PERICOLACE) 8.6-50 mg per tablet Take 1 tablet by mouth daily as needed for  Constipation.    tiZANidine (ZANAFLEX) 4 MG tablet Take 1 tablet (4 mg total) by mouth every 8 (eight) hours as needed.     Family History       Problem Relation (Age of Onset)    Diabetes Mother    Heart disease Mother, Father          Tobacco Use    Smoking status: Never    Smokeless tobacco: Never   Substance and Sexual Activity    Alcohol use: No    Drug use: Yes     Types: Marijuana     Comment: periodically     Sexual activity: Not Currently     Birth control/protection: See Surgical Hx     ROS  General ROS: negative for weight loss, weight gain, fevers, chills, night sweats  ENT ROS: negative for epistaxis, headaches or sinus pain  Ophthalmic ROS: negative for blurry vision, decreased vision, double vision or itchy eyes  Cardiovascular ROS: See HPI  Respiratory ROS: See HPI  Gastrointestinal ROS: negative for abdominal pain, change in bowel habits, black or bloody stools, nausea, emesis, or bloating  Genito-Urinary ROS: negative for dysuria, trouble voiding, or hematuria  Neurological ROS: negative for TIA or stroke symptoms  Endocrine ROS: negative for hair pattern changes or unexpected weight changes  Musculoskeletal ROS: negative for muscle pain, weakness, joint pain or joint swelling  Skin: negative for rash, wounds, skin breakdown, or issues otherwise  Hematological and Lymphatic ROS: negative for bleeding, bruising, swollen lymph nodes  Psychological ROS: negative for anxiety or depression      Objective:     Vital Signs (Most Recent):  Temp: 99.1 °F (37.3 °C) (07/28/24 0730)  Pulse: 87 (07/28/24 1000)  Resp: 20 (07/28/24 1103)  BP: (!) 147/92 (07/28/24 1000)  SpO2: 97 % (07/28/24 1000) Vital Signs (24h Range):  Temp:  [99.1 °F (37.3 °C)] 99.1 °F (37.3 °C)  Pulse:  [86-92] 87  Resp:  [20] 20  SpO2:  [97 %-100 %] 97 %  BP: (121-157)/(82-94) 147/92     Weight: 93.7 kg (206 lb 9.1 oz)  Body mass index is 37.78 kg/m².     Physical Exam  Gen: in NAD, appears stated age  Neuro: AAOx4, CN2-12 grossly intact  BL; motor, sensory, and strength grossly intact BL  HEENT: NTNC, EOMI, PERRLA, MMM; no thyromegaly or lymphadenopathy; no JVD appreciated  CVS: Chest pain with palpation. Right subclavian port. RRR, no m/r/g; S1/S2 auscultated with no S3 or S4; capillary refill < 2 sec  Resp: lungs CTAB, no w/r/r; no belabored breathing or accessory muscle use appreciated   Abd: BS+ in all 4 quadrants; NTND, soft to palpation; no organomegaly appreciated   Extrem: RLE scar, pulses full, equal, and regular over all 4 extremities; no UE or LE edema BL             Significant Labs: All pertinent labs within the past 24 hours have been reviewed.    Significant Imaging: I have reviewed all pertinent imaging results/findings within the past 24 hours..  Assessment/Plan:     * Sickle cell pain crisis  Hb-SS disease with vaso-occlusive pain  - Hgb 11.6 on admit, at baseline  - Reticulocyte count 0.7  - Continue home oxy and MS contin  - start gabapentin, continue home tizanidine PRN  - Dilaudid PRN   - Continue IVF to prevent worsening sickling  - adjust regimen as clinically indicated     Hypokalemia  Patient's most recent potassium results are listed below.   Recent Labs     07/28/24  0837   K 3.2*     Plan  - Replete potassium per protocol  - Monitor potassium Daily  - Patient's hypokalemia is stable    History of pulmonary embolism  - hx noted  - continue home Eliquis     Hypertension  Chronic, controlled. Latest blood pressure and vitals reviewed-     Temp:  [99.1 °F (37.3 °C)]   Pulse:  [86-92]   Resp:  [20]   BP: (121-157)/(82-94)   SpO2:  [97 %-100 %] .   Home meds for hypertension were reviewed and noted below.   Hypertension Medications               amLODIPine (NORVASC) 10 MG tablet Take 1 tablet (10 mg total) by mouth once daily.    carvediloL (COREG) 25 MG tablet Take 1 tablet (25 mg total) by mouth 2 (two) times daily.            While in the hospital, will manage blood pressure as follows; Continue home antihypertensive  regimen    Will utilize p.r.n. blood pressure medication only if patient's blood pressure greater than 180/110 and she develops symptoms such as worsening chest pain or shortness of breath.      VTE Risk Mitigation (From admission, onward)           Ordered     apixaban tablet 5 mg  2 times daily         07/28/24 1117     Reason for No Pharmacological VTE Prophylaxis  Once        Question:  Reasons:  Answer:  Already adequately anticoagulated on oral Anticoagulants    07/28/24 1117     IP VTE HIGH RISK PATIENT  Once         07/28/24 1117                                    Aftab Nixon MD  Department of Hospital Medicine  [facility] - Emergency Dept

## 2024-07-28 NOTE — ED NOTES
Patient identifiers verified and correct for Piotrhanda Encalade  LOC: The patient is awake, alert and aware of environment with an appropriate affect, the patient is oriented x 3 and speaking appropriately.   APPEARANCE: Patient appears comfortable and in no acute distress, patient is clean and well groomed.  SKIN: The skin is warm and dry, color consistent with ethnicity, patient has normal skin turgor and moist mucus membranes, skin intact, no breakdown or bruising noted.   MUSCULOSKELETAL: Patient moving all extremities spontaneously, no swelling noted.Pt reports pain to left arm, chest and generalized pain  RESPIRATORY: Airway is open and patent, respirations are spontaneous, patient has a normal effort and rate, no accessory muscle use noted, O2 Sat 97% on room air.  CARDIAC: Patient has a normal rate and regular rhythm, no edema noted, capillary refill < 3 seconds.   GASTRO: Soft and non tender to palpation, no distention noted, Pt states bowel movements have been regular. Pt reports nausea and emesis.  : Pt denies any pain or frequency with urination.  NEURO: Pt opens eyes spontaneously, behavior appropriate to situation, follows commands, facial expression symmetrical, bilateral hand grasp equal and even, purposeful motor response noted, normal sensation in all extremities when touched with a finger.

## 2024-07-28 NOTE — ASSESSMENT & PLAN NOTE
Patient's most recent potassium results are listed below.   Recent Labs     07/28/24  0837   K 3.2*     Plan  - Replete potassium per protocol  - Monitor potassium Daily  - Patient's hypokalemia is stable

## 2024-07-28 NOTE — ASSESSMENT & PLAN NOTE
Hb-SS disease with vaso-occlusive pain  - Hgb 11.6 on admit, at baseline  - Reticulocyte count 0.7  - Continue home oxy and MS contin  - start gabapentin, continue home tizanidine PRN  - Dilaudid PRN   - Continue IVF to prevent worsening sickling  - adjust regimen as clinically indicated

## 2024-07-29 LAB
OHS QRS DURATION: 92 MS
OHS QTC CALCULATION: 467 MS

## 2024-07-29 PROCEDURE — 63600175 PHARM REV CODE 636 W HCPCS

## 2024-07-29 PROCEDURE — 25000003 PHARM REV CODE 250

## 2024-07-29 PROCEDURE — 21400001 HC TELEMETRY ROOM

## 2024-07-29 PROCEDURE — 25000003 PHARM REV CODE 250: Performed by: INTERNAL MEDICINE

## 2024-07-29 PROCEDURE — 63600175 PHARM REV CODE 636 W HCPCS: Performed by: INTERNAL MEDICINE

## 2024-07-29 PROCEDURE — 93010 ELECTROCARDIOGRAM REPORT: CPT | Mod: ,,, | Performed by: INTERNAL MEDICINE

## 2024-07-29 PROCEDURE — 93005 ELECTROCARDIOGRAM TRACING: CPT

## 2024-07-29 RX ORDER — ACETAMINOPHEN 325 MG/1
650 TABLET ORAL EVERY 4 HOURS PRN
Status: CANCELLED | OUTPATIENT
Start: 2024-07-29

## 2024-07-29 RX ORDER — HYDROMORPHONE HYDROCHLORIDE 1 MG/ML
1 INJECTION, SOLUTION INTRAMUSCULAR; INTRAVENOUS; SUBCUTANEOUS ONCE
Status: COMPLETED | OUTPATIENT
Start: 2024-07-29 | End: 2024-07-29

## 2024-07-29 RX ADMIN — CARVEDILOL 25 MG: 25 TABLET, FILM COATED ORAL at 09:07

## 2024-07-29 RX ADMIN — TIZANIDINE 4 MG: 2 TABLET ORAL at 06:07

## 2024-07-29 RX ADMIN — APIXABAN 5 MG: 5 TABLET, FILM COATED ORAL at 09:07

## 2024-07-29 RX ADMIN — HYDROXYZINE PAMOATE 50 MG: 25 CAPSULE ORAL at 06:07

## 2024-07-29 RX ADMIN — HYDROMORPHONE HYDROCHLORIDE 3 MG: 1 INJECTION, SOLUTION INTRAMUSCULAR; INTRAVENOUS; SUBCUTANEOUS at 06:07

## 2024-07-29 RX ADMIN — TIZANIDINE 4 MG: 2 TABLET ORAL at 05:07

## 2024-07-29 RX ADMIN — OXYCODONE HYDROCHLORIDE AND ACETAMINOPHEN 1 TABLET: 10; 325 TABLET ORAL at 04:07

## 2024-07-29 RX ADMIN — OXYCODONE HYDROCHLORIDE AND ACETAMINOPHEN 1 TABLET: 10; 325 TABLET ORAL at 09:07

## 2024-07-29 RX ADMIN — MORPHINE SULFATE 15 MG: 15 TABLET, EXTENDED RELEASE ORAL at 11:07

## 2024-07-29 RX ADMIN — HYDROMORPHONE HYDROCHLORIDE 3 MG: 1 INJECTION, SOLUTION INTRAMUSCULAR; INTRAVENOUS; SUBCUTANEOUS at 05:07

## 2024-07-29 RX ADMIN — AMLODIPINE BESYLATE 10 MG: 10 TABLET ORAL at 09:07

## 2024-07-29 RX ADMIN — HYDROMORPHONE HYDROCHLORIDE 3 MG: 1 INJECTION, SOLUTION INTRAMUSCULAR; INTRAVENOUS; SUBCUTANEOUS at 12:07

## 2024-07-29 RX ADMIN — FLUOXETINE HYDROCHLORIDE 40 MG: 20 CAPSULE ORAL at 09:07

## 2024-07-29 RX ADMIN — TIZANIDINE 4 MG: 2 TABLET ORAL at 09:07

## 2024-07-29 RX ADMIN — HYDROMORPHONE HYDROCHLORIDE 1 MG: 1 INJECTION, SOLUTION INTRAMUSCULAR; INTRAVENOUS; SUBCUTANEOUS at 06:07

## 2024-07-29 RX ADMIN — SODIUM CHLORIDE, POTASSIUM CHLORIDE, SODIUM LACTATE AND CALCIUM CHLORIDE: 600; 310; 30; 20 INJECTION, SOLUTION INTRAVENOUS at 01:07

## 2024-07-29 RX ADMIN — FOLIC ACID 1 MG: 1 TABLET ORAL at 09:07

## 2024-07-29 RX ADMIN — MORPHINE SULFATE 15 MG: 15 TABLET, EXTENDED RELEASE ORAL at 10:07

## 2024-07-29 RX ADMIN — HYDROMORPHONE HYDROCHLORIDE 3 MG: 1 INJECTION, SOLUTION INTRAMUSCULAR; INTRAVENOUS; SUBCUTANEOUS at 02:07

## 2024-07-29 RX ADMIN — GABAPENTIN 600 MG: 300 CAPSULE ORAL at 09:07

## 2024-07-29 RX ADMIN — HYDROMORPHONE HYDROCHLORIDE 3 MG: 1 INJECTION, SOLUTION INTRAMUSCULAR; INTRAVENOUS; SUBCUTANEOUS at 09:07

## 2024-07-29 NOTE — HOSPITAL COURSE
Patient admitted for sickle cell pain crisis. Infectious work up negative. Started on IV pain medication PRN and IVF. US of left cath/chest obtained due to superficial pain around port. US without fluid collection or mass and pain believed to be secondary to sickle cell pain crisis.  Patient with continued in the left chest and upper extremity.  Left arm with partial DVT in left IJ.  Anticoagulation switch from apixaban to heparin drip.  IV fluids discontinued given concern for swelling and diuresed given edema.  Bilateral lower extremity ultrasound and CT chest negative for PE/DVT.  Hematology consulted, recommended extended transfusion like she has undergone previously during her sickle cell care in [place] prior to moving to [place].    Transfusion medicine consulted. Plan for exchange transfusion with 6u pRBC 8/8. General surgery consulted for left port removal; since port flushes and acute thrombosis is not an indication for port removal, general surgery signed off.    Patient with new acute left lower extremity weakness on 8/8. Stroke code called and CTA and brain MRI unremarkable. Patient had complete recovery and ambulating well. Left chest port removed by general surgery given continued pain. Three days post removal, patient had acute swelling where left chest port was. General surgery assessed wound and concerned for hematoma and heparin gtt held 4 hours and pressure dressing applied and started on bactrim out of caution. Developed erythema and bactrim transitioned to Vanc for developing cellulitis. General surgery to follow for need of possible draining. Swelling improved.    Patient developed FLOR 1.5 -> 2.0 after initiation of bactrim; likely pseuo-elevation in setting of bactrim and continues to have good UOP. Renal function improved post bactrim discontinuation. To complete course of treatment with Keflex. Patient agreeable to Loveonx at discharge for AC for DVT. Primary care and heme/onc follow up  requested. Pain controlled at discharge.    Patient deemed appropriate for discharge. I personally saw, examined, and evaluated patient prior to departure. Plan discussed with patient, who was agreeable and amenable; medications were discussed and reviewed, outpatient follow-up scheduled, ER precautions were given, all questions were answered to the patient's satisfaction, and Nazanin Malone was subsequently discharged.

## 2024-07-29 NOTE — PLAN OF CARE
Problem: Adult Inpatient Plan of Care  Goal: Plan of Care Review  Outcome: Not Progressing  Goal: Optimal Comfort and Wellbeing  Outcome: Not Progressing     Problem: Pain Acute  Goal: Optimal Pain Control and Function  Outcome: Not Progressing

## 2024-07-29 NOTE — PROGRESS NOTES
[facility] - Med Surg ([place] [address])  [facility] Medicine  Progress Note    Patient Name: Nazanin Malone  MRN: [id number]  Patient Class: IP- Inpatient   Admission Date: 7/28/2024  Length of Stay: 1 days  Attending Physician: Aftab Nixon MD  Primary Care Provider: Pee Montgomery MD        Subjective:     Principal Problem:Sickle cell pain crisis        HPI:  Nazanin Malone is a 44 year old female with history of SC anemia with history of acute chest, beta thalassemia, HTN, obesity, iron deficiency anemia, hx of PE on apixaban, RLE fasciotomy, and recent hospitalization for bacteremia s/p antibiotic course and recent sickle cell pain crisis that presents with sickle cell pain crisis. Patient reports recurrent pain consistent with her prior sickle cell pain crisis. Her pain started 2-3 days ago which started mostly in her left chest. Pain was constant without relief despite taking her home opiate regimen. Yesterday, she noted pain in her left arm and some shortness of breath. She reports a cough with yellow sputum and felt like she was oscillating between feeling hot and cold as well as low appetite associated with nausea. Denies F/C, abdominal pain, constipation.    Of note, had recent hospitalization for staph epidermis bacteremia s/p 14 day course of IV vancomycin.    Upon arrival to the ED, patient afebrile, HDS, and saturating well on room air. Labs notable for WBC 9, Hgb 11.5 (higher than baseline), K 3.2, trop negative, BNP WNL, blood cultures x2 sent. EKG without ischemic changes. CXR without consolidation.     Overview/Hospital Course:  Patient admitted for sickle cell pain crisis. Infectious work up negative.    Interval History: Pt seen and examined this morning on rounds. NAEON. Pain well controlled this morning. Patient slept throughout the night. Labs not drawn this morning. Care plan reviewed. Otherwise, doing well and with no further complaints at this time.        Objective:     Vital  Signs (Most Recent):  Temp: 97.6 °F (36.4 °C) (07/29/24 0724)  Pulse: 74 (07/29/24 0745)  Resp: 17 (07/29/24 0939)  BP: 108/78 (07/29/24 0724)  SpO2: 95 % (07/29/24 0745) Vital Signs (24h Range):  Temp:  [97.6 °F (36.4 °C)-98.5 °F (36.9 °C)] 97.6 °F (36.4 °C)  Pulse:  [] 74  Resp:  [16-20] 17  SpO2:  [92 %-99 %] 95 %  BP: ()/() 108/78     Weight: 93.7 kg (206 lb 9.1 oz)  Body mass index is 37.78 kg/m².    Intake/Output Summary (Last 24 hours) at 7/29/2024 0951  Last data filed at 7/29/2024 0624  Gross per 24 hour   Intake --   Output 150 ml   Net -150 ml         Physical Exam  Gen: in NAD, appears stated age  Neuro: AAOx4, CN2-12 grossly intact BL; motor, sensory, and strength grossly intact BL  HEENT: NTNC, EOMI, PERRLA, MMM; no thyromegaly or lymphadenopathy; no JVD appreciated  CVS: RRR, no m/r/g; S1/S2 auscultated with no S3 or S4; capillary refill < 2 sec  Resp: lungs CTAB, no w/r/r; no belabored breathing or accessory muscle use appreciated   Abd: BS+ in all 4 quadrants; NTND, soft to palpation; no organomegaly appreciated   Extrem: pulses full, equal, and regular over all 4 extremities; no UE or LE edema BL          Significant Labs: All pertinent labs within the past 24 hours have been reviewed.    Significant Imaging: I have reviewed all pertinent imaging results/findings within the past 24 hours.    Assessment/Plan:      * Sickle cell pain crisis  Hb-SS disease with vaso-occlusive pain  - Hgb 11.6 on admit, at baseline  - Reticulocyte count 0.7  - Continue home oxy and MS contin  - start gabapentin, continue home tizanidine PRN  - Dilaudid PRN   - Continue IVF to prevent worsening sickling  - adjust regimen as clinically indicated   - 7/29 Pain stable     Hypokalemia  Patient's most recent potassium results are listed below.   Recent Labs     07/28/24  0837   K 3.2*     Plan  - Replete potassium per protocol  - Monitor potassium Daily  - Patient's hypokalemia is stable    History of  pulmonary embolism  - hx noted  - continue home Eliquis     Hypertension  Chronic, controlled. Latest blood pressure and vitals reviewed-     Temp:  [99.1 °F (37.3 °C)]   Pulse:  [86-92]   Resp:  [20]   BP: (121-157)/(82-94)   SpO2:  [97 %-100 %] .   Home meds for hypertension were reviewed and noted below.   Hypertension Medications               amLODIPine (NORVASC) 10 MG tablet Take 1 tablet (10 mg total) by mouth once daily.    carvediloL (COREG) 25 MG tablet Take 1 tablet (25 mg total) by mouth 2 (two) times daily.            While in the hospital, will manage blood pressure as follows; Continue home antihypertensive regimen    Will utilize p.r.n. blood pressure medication only if patient's blood pressure greater than 180/110 and she develops symptoms such as worsening chest pain or shortness of breath.      VTE Risk Mitigation (From admission, onward)           Ordered     apixaban tablet 5 mg  2 times daily         07/28/24 1117     Reason for No Pharmacological VTE Prophylaxis  Once        Question:  Reasons:  Answer:  Already adequately anticoagulated on oral Anticoagulants    07/28/24 1117     IP VTE HIGH RISK PATIENT  Once         07/28/24 1117                    Discharge Planning   ALYSE: 8/2/2024     Code Status: Full Code   Is the patient medically ready for discharge?:     Reason for patient still in hospital (select all that apply): Treatment                     Aftab Nixon MD  Department of Hospital Medicine   [facility] - Med Surg ([place])

## 2024-07-29 NOTE — SUBJECTIVE & OBJECTIVE
Interval History: Pt seen and examined this morning on wallace. SANCHO. Pain well controlled this morning. Patient slept throughout the night. Labs not drawn this morning. Care plan reviewed. Otherwise, doing well and with no further complaints at this time.        Objective:     Vital Signs (Most Recent):  Temp: 97.6 °F (36.4 °C) (07/29/24 0724)  Pulse: 74 (07/29/24 0745)  Resp: 17 (07/29/24 0939)  BP: 108/78 (07/29/24 0724)  SpO2: 95 % (07/29/24 0745) Vital Signs (24h Range):  Temp:  [97.6 °F (36.4 °C)-98.5 °F (36.9 °C)] 97.6 °F (36.4 °C)  Pulse:  [] 74  Resp:  [16-20] 17  SpO2:  [92 %-99 %] 95 %  BP: ()/() 108/78     Weight: 93.7 kg (206 lb 9.1 oz)  Body mass index is 37.78 kg/m².    Intake/Output Summary (Last 24 hours) at 7/29/2024 0951  Last data filed at 7/29/2024 0624  Gross per 24 hour   Intake --   Output 150 ml   Net -150 ml         Physical Exam  Gen: in NAD, appears stated age  Neuro: AAOx4, CN2-12 grossly intact BL; motor, sensory, and strength grossly intact BL  HEENT: NTNC, EOMI, PERRLA, MMM; no thyromegaly or lymphadenopathy; no JVD appreciated  CVS: RRR, no m/r/g; S1/S2 auscultated with no S3 or S4; capillary refill < 2 sec  Resp: lungs CTAB, no w/r/r; no belabored breathing or accessory muscle use appreciated   Abd: BS+ in all 4 quadrants; NTND, soft to palpation; no organomegaly appreciated   Extrem: pulses full, equal, and regular over all 4 extremities; no UE or LE edema BL          Significant Labs: All pertinent labs within the past 24 hours have been reviewed.    Significant Imaging: I have reviewed all pertinent imaging results/findings within the past 24 hours.

## 2024-07-29 NOTE — NURSING
Nurses Note -- 4 Eyes      7/29/2024   2:43 AM      Skin assessed during: Admit      [x] No Altered Skin Integrity Present    []Prevention Measures Documented      [] Yes- Altered Skin Integrity Present or Discovered   [] LDA Added if Not in Epic (Describe Wound)   [] New Altered Skin Integrity was Present on Admit and Documented in LDA   [] Wound Image Taken    Wound Care Consulted? No    Attending Nurse:  Dean Damina RN/Staff Member:   Lito BLANCO

## 2024-07-29 NOTE — PLAN OF CARE
Problem: Adult Inpatient Plan of Care  Goal: Plan of Care Review  Outcome: Progressing  Goal: Patient-Specific Goal (Individualized)  Outcome: Progressing  Goal: Absence of Hospital-Acquired Illness or Injury  Outcome: Progressing  Goal: Optimal Comfort and Wellbeing  Outcome: Progressing  Goal: Readiness for Transition of Care  Outcome: Progressing     Problem: Infection  Goal: Absence of Infection Signs and Symptoms  Outcome: Progressing     Problem: Acute Kidney Injury/Impairment  Goal: Fluid and Electrolyte Balance  Outcome: Progressing  Goal: Improved Oral Intake  Outcome: Progressing  Goal: Effective Renal Function  Outcome: Progressing     Problem: Pneumonia  Goal: Fluid Balance  Outcome: Progressing  Goal: Resolution of Infection Signs and Symptoms  Outcome: Progressing  Goal: Effective Oxygenation and Ventilation  Outcome: Progressing

## 2024-07-29 NOTE — PLAN OF CARE
[facility] - Med Surg ([facility]-16)  Initial Discharge Assessment    SW met w/patient in room for Discharge Planning Assessment. Patient is independent with ADL's and uses no DME for ambulation. Patient will have assistance from her family upon discharge and they will transport patient home. All questions addressed. SW will follow for needs.    Primary Care Provider: Pee Montgomery MD    Admission Diagnosis: Hypokalemia [E87.6]  Sickle cell pain crisis [D57.00]  Chest pain [R07.9]  Chest pain, unspecified type [R07.9]    Admission Date: 7/28/2024  Expected Discharge Date: 8/2/2024         Payor: [employer]ET[employer] MANAGED MEDICARE / Plan: [employer] MEDICARE PLAN HMO / Product Type: Medicare Advantage /     Extended Emergency Contact Information  Primary Emergency Contact: Oksana Lema  Mobile Phone: [phone]  Relation: Relative  Preferred language: English   needed? No    Discharge Plan A: (P) Home with family  Discharge Plan B: (P) Home      [id number] - [place], [place] - [address]  [address]  [place] [address]  Phone: [phone] Fax: [phone]    [facility]  [address] [facility] [address]  Phone: [phone] Fax: [phone]    [facility]  [address]  [facility] [address]  Phone: [phone] Fax: [phone]    [id number] - [place], [place] - [address]  [address]  [place] [address]  Phone: [phone] Fax: [phone]    [id number] [place] - [address] AT [facility]  [address]  [place] [address]  Phone: [phone] Fax: [phone]    [id number] [place] - [address]  [address]  [place] [address]  Phone: [phone] Fax: [phone]      Initial Assessment (most recent)       Adult Discharge Assessment - 07/29/24 0920          Discharge Assessment    Assessment Type Discharge Planning Assessment      Confirmed/corrected address, phone number and insurance Yes     Confirmed Demographics Correct on Facesheet     Source of Information patient     Communicated ALYSE with patient/caregiver Yes     Reason For Admission Sickle cell pain crisis     People in Home child(jayce), adult     Do you expect to return to your current living situation? Yes     Do you have help at home or someone to help you manage your care at home? Yes     Who are your caregiver(s) and their phone number(s)? Oksana Lema (Relative)  [phone]     Prior to hospitilization cognitive status: Unable to Assess     Current cognitive status: Alert/Oriented     Walking or Climbing Stairs Difficulty no     Dressing/Bathing Difficulty no     Do you have any problems with: --   Independent    Home Layout Able to live on 1st floor     Equipment Currently Used at Home none     Readmission within 30 days? Yes     Do you currently have service(s) that help you manage your care at home? No (P)      Do you take prescription medications? Yes (P)      Do you have prescription coverage? Yes (P)      Coverage AETNA MANAGED MEDICARE - AETNA MEDICARE PLAN HMO - (P)      Do you have any problems affording any of your prescribed medications? TBD (P)      Who is going to help you get home at discharge? Family (P)      How do you get to doctors appointments? car, drives self;family or friend will provide (P)      Are you on dialysis? No (P)      Do you take coumadin? No (P)      Discharge Plan A Home with family (P)      Discharge Plan B Home (P)      DME Needed Upon Discharge  other (see comments) (P)    TBD    Discharge Plan discussed with: Patient (P)         Physical Activity    On average, how many days per week do you engage in moderate to strenuous exercise (like a brisk walk)? -- (P)    SDOH completed within 6 months.                    Readmission Assessment (most recent)       Readmission Assessment - 07/29/24 6711          Readmission    Was this a planned  readmission? No     Why were you hospitalized in the last 30 days? Sickle cell pain crisis     Why were you readmitted? Related to previous admission     When you left the hospital where did you go? Home with Family     Did patient/caregiver refused recommended DC plan? No     Tell me about what happened between when you left the hospital and the day you returned. Symptoms began about 2-3 days ago.                    Discharge Plan A and Plan B have been determined by review of patient's clinical status, future medical and therapeutic needs, and coverage/benefits for post-acute care in coordination with multidisciplinary team members.    VENKATESH Sampson, LMSW    Case Management Department  [facility]

## 2024-07-30 LAB
ALBUMIN SERPL BCP-MCNC: 3.3 G/DL (ref 3.5–5.2)
ALP SERPL-CCNC: 73 U/L (ref 55–135)
ALT SERPL W/O P-5'-P-CCNC: 42 U/L (ref 10–44)
ANION GAP SERPL CALC-SCNC: 10 MMOL/L (ref 8–16)
AST SERPL-CCNC: 77 U/L (ref 10–40)
BASOPHILS # BLD AUTO: 0.04 K/UL (ref 0–0.2)
BASOPHILS NFR BLD: 0.6 % (ref 0–1.9)
BILIRUB SERPL-MCNC: 0.4 MG/DL (ref 0.1–1)
BUN SERPL-MCNC: 21 MG/DL (ref 6–20)
CALCIUM SERPL-MCNC: 9.1 MG/DL (ref 8.7–10.5)
CHLORIDE SERPL-SCNC: 105 MMOL/L (ref 95–110)
CO2 SERPL-SCNC: 25 MMOL/L (ref 23–29)
CREAT SERPL-MCNC: 1.5 MG/DL (ref 0.5–1.4)
DIFFERENTIAL METHOD BLD: ABNORMAL
EOSINOPHIL # BLD AUTO: 0.4 K/UL (ref 0–0.5)
EOSINOPHIL NFR BLD: 5 % (ref 0–8)
ERYTHROCYTE [DISTWIDTH] IN BLOOD BY AUTOMATED COUNT: 18.5 % (ref 11.5–14.5)
EST. GFR  (NO RACE VARIABLE): 43.3 ML/MIN/1.73 M^2
GLUCOSE SERPL-MCNC: 87 MG/DL (ref 70–110)
HCT VFR BLD AUTO: 28.2 % (ref 37–48.5)
HGB BLD-MCNC: 9.3 G/DL (ref 12–16)
IMM GRANULOCYTES # BLD AUTO: 0.01 K/UL (ref 0–0.04)
IMM GRANULOCYTES NFR BLD AUTO: 0.1 % (ref 0–0.5)
LYMPHOCYTES # BLD AUTO: 3.1 K/UL (ref 1–4.8)
LYMPHOCYTES NFR BLD: 45.2 % (ref 18–48)
MAGNESIUM SERPL-MCNC: 1.8 MG/DL (ref 1.6–2.6)
MCH RBC QN AUTO: 24.2 PG (ref 27–31)
MCHC RBC AUTO-ENTMCNC: 33 G/DL (ref 32–36)
MCV RBC AUTO: 73 FL (ref 82–98)
MONOCYTES # BLD AUTO: 0.9 K/UL (ref 0.3–1)
MONOCYTES NFR BLD: 12.5 % (ref 4–15)
NEUTROPHILS # BLD AUTO: 2.5 K/UL (ref 1.8–7.7)
NEUTROPHILS NFR BLD: 36.6 % (ref 38–73)
NRBC BLD-RTO: 1 /100 WBC
PHOSPHATE SERPL-MCNC: 4.4 MG/DL (ref 2.7–4.5)
PLATELET # BLD AUTO: 257 K/UL (ref 150–450)
PMV BLD AUTO: 10.5 FL (ref 9.2–12.9)
POTASSIUM SERPL-SCNC: 3.9 MMOL/L (ref 3.5–5.1)
PROT SERPL-MCNC: 6.7 G/DL (ref 6–8.4)
RBC # BLD AUTO: 3.84 M/UL (ref 4–5.4)
SODIUM SERPL-SCNC: 140 MMOL/L (ref 136–145)
WBC # BLD AUTO: 6.95 K/UL (ref 3.9–12.7)

## 2024-07-30 PROCEDURE — 21400001 HC TELEMETRY ROOM

## 2024-07-30 PROCEDURE — 83735 ASSAY OF MAGNESIUM: CPT

## 2024-07-30 PROCEDURE — 85025 COMPLETE CBC W/AUTO DIFF WBC: CPT

## 2024-07-30 PROCEDURE — 63600175 PHARM REV CODE 636 W HCPCS

## 2024-07-30 PROCEDURE — 25000003 PHARM REV CODE 250

## 2024-07-30 PROCEDURE — 63600175 PHARM REV CODE 636 W HCPCS: Performed by: INTERNAL MEDICINE

## 2024-07-30 PROCEDURE — 36415 COLL VENOUS BLD VENIPUNCTURE: CPT

## 2024-07-30 PROCEDURE — 84100 ASSAY OF PHOSPHORUS: CPT

## 2024-07-30 PROCEDURE — 80053 COMPREHEN METABOLIC PANEL: CPT

## 2024-07-30 RX ORDER — HYDROMORPHONE HYDROCHLORIDE 1 MG/ML
1 INJECTION, SOLUTION INTRAMUSCULAR; INTRAVENOUS; SUBCUTANEOUS ONCE
Status: COMPLETED | OUTPATIENT
Start: 2024-07-30 | End: 2024-07-30

## 2024-07-30 RX ORDER — DIPHENHYDRAMINE HYDROCHLORIDE 50 MG/ML
25 INJECTION INTRAMUSCULAR; INTRAVENOUS EVERY 6 HOURS PRN
Status: DISCONTINUED | OUTPATIENT
Start: 2024-07-30 | End: 2024-08-02

## 2024-07-30 RX ADMIN — TIZANIDINE 4 MG: 2 TABLET ORAL at 02:07

## 2024-07-30 RX ADMIN — HYDROMORPHONE HYDROCHLORIDE 3 MG: 1 INJECTION, SOLUTION INTRAMUSCULAR; INTRAVENOUS; SUBCUTANEOUS at 02:07

## 2024-07-30 RX ADMIN — APIXABAN 5 MG: 5 TABLET, FILM COATED ORAL at 10:07

## 2024-07-30 RX ADMIN — AMLODIPINE BESYLATE 10 MG: 10 TABLET ORAL at 10:07

## 2024-07-30 RX ADMIN — FLUOXETINE HYDROCHLORIDE 40 MG: 20 CAPSULE ORAL at 10:07

## 2024-07-30 RX ADMIN — DIPHENHYDRAMINE HYDROCHLORIDE 25 MG: 50 INJECTION, SOLUTION INTRAMUSCULAR; INTRAVENOUS at 11:07

## 2024-07-30 RX ADMIN — HYDROMORPHONE HYDROCHLORIDE 3 MG: 1 INJECTION, SOLUTION INTRAMUSCULAR; INTRAVENOUS; SUBCUTANEOUS at 10:07

## 2024-07-30 RX ADMIN — OXYCODONE HYDROCHLORIDE AND ACETAMINOPHEN 1 TABLET: 10; 325 TABLET ORAL at 09:07

## 2024-07-30 RX ADMIN — HYDROMORPHONE HYDROCHLORIDE 3 MG: 1 INJECTION, SOLUTION INTRAMUSCULAR; INTRAVENOUS; SUBCUTANEOUS at 09:07

## 2024-07-30 RX ADMIN — HYDROMORPHONE HYDROCHLORIDE 3 MG: 1 INJECTION, SOLUTION INTRAMUSCULAR; INTRAVENOUS; SUBCUTANEOUS at 06:07

## 2024-07-30 RX ADMIN — HYDROMORPHONE HYDROCHLORIDE 3 MG: 1 INJECTION, SOLUTION INTRAMUSCULAR; INTRAVENOUS; SUBCUTANEOUS at 05:07

## 2024-07-30 RX ADMIN — FOLIC ACID 1 MG: 1 TABLET ORAL at 10:07

## 2024-07-30 RX ADMIN — DIPHENHYDRAMINE HYDROCHLORIDE 25 MG: 50 INJECTION, SOLUTION INTRAMUSCULAR; INTRAVENOUS at 06:07

## 2024-07-30 RX ADMIN — GABAPENTIN 600 MG: 300 CAPSULE ORAL at 09:07

## 2024-07-30 RX ADMIN — MORPHINE SULFATE 15 MG: 15 TABLET, EXTENDED RELEASE ORAL at 10:07

## 2024-07-30 RX ADMIN — TIZANIDINE 4 MG: 2 TABLET ORAL at 09:07

## 2024-07-30 RX ADMIN — TIZANIDINE 4 MG: 2 TABLET ORAL at 10:07

## 2024-07-30 RX ADMIN — PROMETHAZINE HYDROCHLORIDE 25 MG: 12.5 TABLET ORAL at 02:07

## 2024-07-30 RX ADMIN — OXYCODONE HYDROCHLORIDE AND ACETAMINOPHEN 1 TABLET: 10; 325 TABLET ORAL at 01:07

## 2024-07-30 RX ADMIN — APIXABAN 5 MG: 5 TABLET, FILM COATED ORAL at 09:07

## 2024-07-30 RX ADMIN — CARVEDILOL 25 MG: 25 TABLET, FILM COATED ORAL at 09:07

## 2024-07-30 RX ADMIN — HYDROMORPHONE HYDROCHLORIDE 1 MG: 1 INJECTION, SOLUTION INTRAMUSCULAR; INTRAVENOUS; SUBCUTANEOUS at 12:07

## 2024-07-30 RX ADMIN — HYDROMORPHONE HYDROCHLORIDE 1 MG: 1 INJECTION, SOLUTION INTRAMUSCULAR; INTRAVENOUS; SUBCUTANEOUS at 07:07

## 2024-07-30 RX ADMIN — MORPHINE SULFATE 15 MG: 15 TABLET, EXTENDED RELEASE ORAL at 09:07

## 2024-07-30 RX ADMIN — OXYCODONE HYDROCHLORIDE AND ACETAMINOPHEN 1 TABLET: 10; 325 TABLET ORAL at 03:07

## 2024-07-30 NOTE — ASSESSMENT & PLAN NOTE
Hb-SS disease with vaso-occlusive pain  - Hgb 11.6 on admit, at baseline  - Reticulocyte count 0.7  - Continue home oxy and MS contin  - start gabapentin, continue home tizanidine PRN  - Dilaudid PRN   - Continue IVF to prevent worsening sickling  - adjust regimen as clinically indicated   - Neck ultrasound to rule out other etiologies of left neck pain

## 2024-07-30 NOTE — PLAN OF CARE
SW assigned to pt's care team. SW will continue to follow for d/c needs.           Alyssa Montes LMSW   - [facility]

## 2024-07-30 NOTE — SUBJECTIVE & OBJECTIVE
Interval History: Pt seen and examined this morning on rounds. SANCHO. Continues to have severe sickling pain. Pain around left neck and armpit most notable. Has a port near areas of pain - no erythema/induration. Will obtain ultrasound for further evaluation. Cr 1.5; will give IVF.      Objective:     Vital Signs (Most Recent):  Temp: 98.1 °F (36.7 °C) (07/30/24 0716)  Pulse: 69 (07/30/24 0716)  Resp: 18 (07/30/24 0716)  BP: 99/66 (07/30/24 0716)  SpO2: 97 % (07/30/24 0716) Vital Signs (24h Range):  Temp:  [97 °F (36.1 °C)-98.1 °F (36.7 °C)] 98.1 °F (36.7 °C)  Pulse:  [66-80] 69  Resp:  [16-20] 18  SpO2:  [96 %-99 %] 97 %  BP: ()/(54-82) 99/66     Weight: 93.7 kg (206 lb 9.1 oz)  Body mass index is 37.78 kg/m².    Intake/Output Summary (Last 24 hours) at 7/30/2024 0853  Last data filed at 7/29/2024 1247  Gross per 24 hour   Intake 480 ml   Output --   Net 480 ml         Physical Exam  Gen: in NAD, appears stated age  Neuro: AAOx4, CN2-12 grossly intact BL; motor, sensory, and strength grossly intact BL  HEENT: NTNC, EOMI, PERRLA, MMM; no thyromegaly or lymphadenopathy; no JVD appreciated  CVS: Port CDI. RRR, no m/r/g; S1/S2 auscultated with no S3 or S4; capillary refill < 2 sec  Resp: lungs CTAB, no w/r/r; no belabored breathing or accessory muscle use appreciated   Abd: BS+ in all 4 quadrants; NTND, soft to palpation; no organomegaly appreciated   Extrem: pulses full, equal, and regular over all 4 extremities; no UE or LE edema BL          Significant Labs: All pertinent labs within the past 24 hours have been reviewed.    Significant Imaging: I have reviewed all pertinent imaging results/findings within the past 24 hours.

## 2024-07-30 NOTE — PROGRESS NOTES
[facility] - Med Surg ([place] [address])  [facility] Medicine  Progress Note    Patient Name: Nazanin Malone  MRN: [id number]  Patient Class: IP- Inpatient   Admission Date: 7/28/2024  Length of Stay: 2 days  Attending Physician: Aftab Nixon MD  Primary Care Provider: Pee Montgomery MD        Subjective:     Principal Problem:Sickle cell pain crisis        HPI:  Nazanin Malone is a 44 year old female with history of SC anemia with history of acute chest, beta thalassemia, HTN, obesity, iron deficiency anemia, hx of PE on apixaban, RLE fasciotomy, and recent hospitalization for bacteremia s/p antibiotic course and recent sickle cell pain crisis that presents with sickle cell pain crisis. Patient reports recurrent pain consistent with her prior sickle cell pain crisis. Her pain started 2-3 days ago which started mostly in her left chest. Pain was constant without relief despite taking her home opiate regimen. Yesterday, she noted pain in her left arm and some shortness of breath. She reports a cough with yellow sputum and felt like she was oscillating between feeling hot and cold as well as low appetite associated with nausea. Denies F/C, abdominal pain, constipation.    Of note, had recent hospitalization for staph epidermis bacteremia s/p 14 day course of IV vancomycin.    Upon arrival to the ED, patient afebrile, HDS, and saturating well on room air. Labs notable for WBC 9, Hgb 11.5 (higher than baseline), K 3.2, trop negative, BNP WNL, blood cultures x2 sent. EKG without ischemic changes. CXR without consolidation.     Overview/Hospital Course:  Patient admitted for sickle cell pain crisis. Infectious work up negative. Started on IV pain medication PRN and IVF.    Interval History: Pt seen and examined this morning on rounds. NAEON. Continues to have severe sickling pain. Pain around left neck and armpit most notable. Has a port near areas of pain - no erythema/induration. Will obtain ultrasound for  further evaluation. Cr 1.5; will give IVF.      Objective:     Vital Signs (Most Recent):  Temp: 98.1 °F (36.7 °C) (07/30/24 0716)  Pulse: 69 (07/30/24 0716)  Resp: 18 (07/30/24 0716)  BP: 99/66 (07/30/24 0716)  SpO2: 97 % (07/30/24 0716) Vital Signs (24h Range):  Temp:  [97 °F (36.1 °C)-98.1 °F (36.7 °C)] 98.1 °F (36.7 °C)  Pulse:  [66-80] 69  Resp:  [16-20] 18  SpO2:  [96 %-99 %] 97 %  BP: ()/(54-82) 99/66     Weight: 93.7 kg (206 lb 9.1 oz)  Body mass index is 37.78 kg/m².    Intake/Output Summary (Last 24 hours) at 7/30/2024 0853  Last data filed at 7/29/2024 1247  Gross per 24 hour   Intake 480 ml   Output --   Net 480 ml         Physical Exam  Gen: in NAD, appears stated age  Neuro: AAOx4, CN2-12 grossly intact BL; motor, sensory, and strength grossly intact BL  HEENT: NTNC, EOMI, PERRLA, MMM; no thyromegaly or lymphadenopathy; no JVD appreciated  CVS: Port CDI. RRR, no m/r/g; S1/S2 auscultated with no S3 or S4; capillary refill < 2 sec  Resp: lungs CTAB, no w/r/r; no belabored breathing or accessory muscle use appreciated   Abd: BS+ in all 4 quadrants; NTND, soft to palpation; no organomegaly appreciated   Extrem: pulses full, equal, and regular over all 4 extremities; no UE or LE edema BL          Significant Labs: All pertinent labs within the past 24 hours have been reviewed.    Significant Imaging: I have reviewed all pertinent imaging results/findings within the past 24 hours.    Assessment/Plan:      * Sickle cell pain crisis  Hb-SS disease with vaso-occlusive pain  - Hgb 11.6 on admit, at baseline  - Reticulocyte count 0.7  - Continue home oxy and MS contin  - start gabapentin, continue home tizanidine PRN  - Dilaudid PRN   - Continue IVF to prevent worsening sickling  - adjust regimen as clinically indicated   - Neck ultrasound to rule out other etiologies of left neck pain    Hypokalemia  Patient's most recent potassium results are listed below.   Recent Labs     07/28/24  0837   K 3.2*      Plan  - Replete potassium per protocol  - Monitor potassium Daily  - Patient's hypokalemia is stable    History of pulmonary embolism  - hx noted  - continue home Eliquis     Hypertension  Chronic, controlled. Latest blood pressure and vitals reviewed-     Temp:  [99.1 °F (37.3 °C)]   Pulse:  [86-92]   Resp:  [20]   BP: (121-157)/(82-94)   SpO2:  [97 %-100 %] .   Home meds for hypertension were reviewed and noted below.   Hypertension Medications               amLODIPine (NORVASC) 10 MG tablet Take 1 tablet (10 mg total) by mouth once daily.    carvediloL (COREG) 25 MG tablet Take 1 tablet (25 mg total) by mouth 2 (two) times daily.            While in the hospital, will manage blood pressure as follows; Continue home antihypertensive regimen    Will utilize p.r.n. blood pressure medication only if patient's blood pressure greater than 180/110 and she develops symptoms such as worsening chest pain or shortness of breath.      VTE Risk Mitigation (From admission, onward)           Ordered     apixaban tablet 5 mg  2 times daily         07/28/24 1117     Reason for No Pharmacological VTE Prophylaxis  Once        Question:  Reasons:  Answer:  Already adequately anticoagulated on oral Anticoagulants    07/28/24 1117     IP VTE HIGH RISK PATIENT  Once         07/28/24 1117                    Discharge Planning   ALYSE: 8/2/2024     Code Status: Full Code   Is the patient medically ready for discharge?:     Reason for patient still in hospital (select all that apply): Treatment  Discharge Plan A: Home with family                  Aftab Nixon MD  Department of Hospital Medicine   [facility] - Med Surg ([place]-16)

## 2024-07-31 LAB
ALBUMIN SERPL BCP-MCNC: 3.1 G/DL (ref 3.5–5.2)
ALP SERPL-CCNC: 68 U/L (ref 55–135)
ALT SERPL W/O P-5'-P-CCNC: 39 U/L (ref 10–44)
ANION GAP SERPL CALC-SCNC: 6 MMOL/L (ref 8–16)
AST SERPL-CCNC: 45 U/L (ref 10–40)
BASOPHILS # BLD AUTO: 0.04 K/UL (ref 0–0.2)
BASOPHILS NFR BLD: 0.6 % (ref 0–1.9)
BILIRUB SERPL-MCNC: 0.5 MG/DL (ref 0.1–1)
BUN SERPL-MCNC: 18 MG/DL (ref 6–20)
CALCIUM SERPL-MCNC: 8.9 MG/DL (ref 8.7–10.5)
CHLORIDE SERPL-SCNC: 108 MMOL/L (ref 95–110)
CO2 SERPL-SCNC: 27 MMOL/L (ref 23–29)
CREAT SERPL-MCNC: 1.1 MG/DL (ref 0.5–1.4)
DIFFERENTIAL METHOD BLD: ABNORMAL
EOSINOPHIL # BLD AUTO: 0.4 K/UL (ref 0–0.5)
EOSINOPHIL NFR BLD: 5.6 % (ref 0–8)
ERYTHROCYTE [DISTWIDTH] IN BLOOD BY AUTOMATED COUNT: 18.5 % (ref 11.5–14.5)
EST. GFR  (NO RACE VARIABLE): >60 ML/MIN/1.73 M^2
GLUCOSE SERPL-MCNC: 70 MG/DL (ref 70–110)
HCT VFR BLD AUTO: 26.9 % (ref 37–48.5)
HGB BLD-MCNC: 8.9 G/DL (ref 12–16)
IMM GRANULOCYTES # BLD AUTO: 0.01 K/UL (ref 0–0.04)
IMM GRANULOCYTES NFR BLD AUTO: 0.2 % (ref 0–0.5)
LYMPHOCYTES # BLD AUTO: 3.2 K/UL (ref 1–4.8)
LYMPHOCYTES NFR BLD: 47.4 % (ref 18–48)
MAGNESIUM SERPL-MCNC: 1.9 MG/DL (ref 1.6–2.6)
MCH RBC QN AUTO: 23.9 PG (ref 27–31)
MCHC RBC AUTO-ENTMCNC: 33.1 G/DL (ref 32–36)
MCV RBC AUTO: 72 FL (ref 82–98)
MONOCYTES # BLD AUTO: 1 K/UL (ref 0.3–1)
MONOCYTES NFR BLD: 14.7 % (ref 4–15)
NEUTROPHILS # BLD AUTO: 2.1 K/UL (ref 1.8–7.7)
NEUTROPHILS NFR BLD: 31.5 % (ref 38–73)
NRBC BLD-RTO: 1 /100 WBC
PHOSPHATE SERPL-MCNC: 3.5 MG/DL (ref 2.7–4.5)
PLATELET # BLD AUTO: 265 K/UL (ref 150–450)
PMV BLD AUTO: 11 FL (ref 9.2–12.9)
POTASSIUM SERPL-SCNC: 3.9 MMOL/L (ref 3.5–5.1)
PROT SERPL-MCNC: 6.3 G/DL (ref 6–8.4)
RBC # BLD AUTO: 3.72 M/UL (ref 4–5.4)
SODIUM SERPL-SCNC: 141 MMOL/L (ref 136–145)
WBC # BLD AUTO: 6.66 K/UL (ref 3.9–12.7)

## 2024-07-31 PROCEDURE — 36415 COLL VENOUS BLD VENIPUNCTURE: CPT

## 2024-07-31 PROCEDURE — 84100 ASSAY OF PHOSPHORUS: CPT

## 2024-07-31 PROCEDURE — 25000003 PHARM REV CODE 250: Performed by: INTERNAL MEDICINE

## 2024-07-31 PROCEDURE — 21400001 HC TELEMETRY ROOM

## 2024-07-31 PROCEDURE — 83735 ASSAY OF MAGNESIUM: CPT

## 2024-07-31 PROCEDURE — 80053 COMPREHEN METABOLIC PANEL: CPT

## 2024-07-31 PROCEDURE — 63600175 PHARM REV CODE 636 W HCPCS

## 2024-07-31 PROCEDURE — 63600175 PHARM REV CODE 636 W HCPCS: Performed by: INTERNAL MEDICINE

## 2024-07-31 PROCEDURE — 25000003 PHARM REV CODE 250

## 2024-07-31 PROCEDURE — 85025 COMPLETE CBC W/AUTO DIFF WBC: CPT

## 2024-07-31 RX ORDER — DIPHENHYDRAMINE HYDROCHLORIDE 50 MG/ML
25 INJECTION INTRAMUSCULAR; INTRAVENOUS ONCE
Status: COMPLETED | OUTPATIENT
Start: 2024-07-31 | End: 2024-07-31

## 2024-07-31 RX ORDER — HYDROMORPHONE HYDROCHLORIDE 2 MG/ML
4 INJECTION, SOLUTION INTRAMUSCULAR; INTRAVENOUS; SUBCUTANEOUS EVERY 4 HOURS PRN
Status: DISCONTINUED | OUTPATIENT
Start: 2024-07-31 | End: 2024-08-01

## 2024-07-31 RX ADMIN — DIPHENHYDRAMINE HYDROCHLORIDE 25 MG: 50 INJECTION, SOLUTION INTRAMUSCULAR; INTRAVENOUS at 05:07

## 2024-07-31 RX ADMIN — BUTALBITAL, ACETAMINOPHEN, AND CAFFEINE 1 TABLET: 50; 325; 40 TABLET ORAL at 08:07

## 2024-07-31 RX ADMIN — DIPHENHYDRAMINE HYDROCHLORIDE 25 MG: 50 INJECTION, SOLUTION INTRAMUSCULAR; INTRAVENOUS at 06:07

## 2024-07-31 RX ADMIN — HYDROMORPHONE HYDROCHLORIDE 4 MG: 2 INJECTION INTRAMUSCULAR; INTRAVENOUS; SUBCUTANEOUS at 10:07

## 2024-07-31 RX ADMIN — Medication 6 MG: at 09:07

## 2024-07-31 RX ADMIN — TIZANIDINE 4 MG: 2 TABLET ORAL at 08:07

## 2024-07-31 RX ADMIN — CARVEDILOL 25 MG: 25 TABLET, FILM COATED ORAL at 10:07

## 2024-07-31 RX ADMIN — FOLIC ACID 1 MG: 1 TABLET ORAL at 10:07

## 2024-07-31 RX ADMIN — HYDROMORPHONE HYDROCHLORIDE 4 MG: 2 INJECTION INTRAMUSCULAR; INTRAVENOUS; SUBCUTANEOUS at 08:07

## 2024-07-31 RX ADMIN — PROMETHAZINE HYDROCHLORIDE 25 MG: 12.5 TABLET ORAL at 07:07

## 2024-07-31 RX ADMIN — MORPHINE SULFATE 15 MG: 15 TABLET, EXTENDED RELEASE ORAL at 09:07

## 2024-07-31 RX ADMIN — GABAPENTIN 600 MG: 300 CAPSULE ORAL at 09:07

## 2024-07-31 RX ADMIN — HYDROMORPHONE HYDROCHLORIDE 4 MG: 2 INJECTION INTRAMUSCULAR; INTRAVENOUS; SUBCUTANEOUS at 01:07

## 2024-07-31 RX ADMIN — HYDROMORPHONE HYDROCHLORIDE 3 MG: 1 INJECTION, SOLUTION INTRAMUSCULAR; INTRAVENOUS; SUBCUTANEOUS at 05:07

## 2024-07-31 RX ADMIN — DIPHENHYDRAMINE HYDROCHLORIDE 25 MG: 50 INJECTION, SOLUTION INTRAMUSCULAR; INTRAVENOUS at 01:07

## 2024-07-31 RX ADMIN — APIXABAN 5 MG: 5 TABLET, FILM COATED ORAL at 09:07

## 2024-07-31 RX ADMIN — OXYCODONE HYDROCHLORIDE AND ACETAMINOPHEN 1 TABLET: 10; 325 TABLET ORAL at 10:07

## 2024-07-31 RX ADMIN — PROMETHAZINE HYDROCHLORIDE 25 MG: 12.5 TABLET ORAL at 08:07

## 2024-07-31 RX ADMIN — AMLODIPINE BESYLATE 10 MG: 10 TABLET ORAL at 10:07

## 2024-07-31 RX ADMIN — FLUOXETINE HYDROCHLORIDE 40 MG: 20 CAPSULE ORAL at 10:07

## 2024-07-31 RX ADMIN — CARVEDILOL 25 MG: 25 TABLET, FILM COATED ORAL at 09:07

## 2024-07-31 RX ADMIN — HYDROMORPHONE HYDROCHLORIDE 4 MG: 2 INJECTION INTRAMUSCULAR; INTRAVENOUS; SUBCUTANEOUS at 05:07

## 2024-07-31 RX ADMIN — MORPHINE SULFATE 15 MG: 15 TABLET, EXTENDED RELEASE ORAL at 10:07

## 2024-07-31 RX ADMIN — OXYCODONE HYDROCHLORIDE AND ACETAMINOPHEN 1 TABLET: 10; 325 TABLET ORAL at 09:07

## 2024-07-31 RX ADMIN — OXYCODONE HYDROCHLORIDE AND ACETAMINOPHEN 1 TABLET: 10; 325 TABLET ORAL at 05:07

## 2024-07-31 RX ADMIN — APIXABAN 5 MG: 5 TABLET, FILM COATED ORAL at 10:07

## 2024-07-31 NOTE — SUBJECTIVE & OBJECTIVE
Interval History: Pt seen and examined this morning on rounds. SANCHO. Continues to have severe pain secondary to sickle cell crisis. Pain particularly painful around port/catheter and soft tissue US of neck, chest, and left arm which was unremarkable. Cr improved, AST downtrending.      Objective:     Vital Signs (Most Recent):  Temp: 97.9 °F (36.6 °C) (07/31/24 0911)  Pulse: 83 (07/31/24 0911)  Resp: 18 (07/31/24 0911)  BP: 128/64 (07/31/24 0911)  SpO2: 96 % (07/31/24 0911) Vital Signs (24h Range):  Temp:  [97.7 °F (36.5 °C)-98.8 °F (37.1 °C)] 97.9 °F (36.6 °C)  Pulse:  [75-92] 83  Resp:  [16-20] 18  SpO2:  [94 %-100 %] 96 %  BP: (106-132)/(63-86) 128/64     Weight: 93.7 kg (206 lb 9.1 oz)  Body mass index is 37.78 kg/m².    Intake/Output Summary (Last 24 hours) at 7/31/2024 0938  Last data filed at 7/31/2024 0835  Gross per 24 hour   Intake 480 ml   Output 1600 ml   Net -1120 ml         Physical Exam  Gen: in NAD, appears stated age  Neuro: AAOx4, CN2-12 grossly intact BL; motor, sensory, and strength grossly intact BL  HEENT: NTNC, EOMI, PERRLA, MMM; no thyromegaly or lymphadenopathy; no JVD appreciated  CVS: Left Port without erythema or induration. RRR, no m/r/g; S1/S2 auscultated with no S3 or S4; capillary refill < 2 sec  Resp: lungs CTAB, no w/r/r; no belabored breathing or accessory muscle use appreciated   Abd: BS+ in all 4 quadrants; NTND, soft to palpation; no organomegaly appreciated   Extrem: pulses full, equal, and regular over all 4 extremities; no UE or LE edema BL          Significant Labs: All pertinent labs within the past 24 hours have been reviewed.    Significant Imaging: I have reviewed all pertinent imaging results/findings within the past 24 hours.

## 2024-07-31 NOTE — ASSESSMENT & PLAN NOTE
Hb-SS disease with vaso-occlusive pain  - Hgb 11.6 on admit, at baseline  - Reticulocyte count 0.7  - Continue home oxy and MS contin  - start gabapentin, continue home tizanidine PRN  - Dilaudid PRN; wean as able  - Continue IVF to prevent worsening sickling  - adjust regimen as clinically indicated   - Given continued pain around port, soft tissue US of neck, chest, and left arm obtained which was unremarkable. Likely pain 2/2 sickle cell pain crisis. If worsening pain may need further evaluation with CT

## 2024-07-31 NOTE — PLAN OF CARE
[facility] - Med Surg ([facility]-16)  Discharge Reassessment    Primary Care Provider: Pee Montgomery MD    Expected Discharge Date: 8/2/2024    Reassessment (most recent)       Discharge Reassessment - 07/31/24 0954          Discharge Reassessment    Assessment Type Discharge Planning Reassessment (P)      Did the patient's condition or plan change since previous assessment? No (P)      Discharge Plan discussed with: Patient (P)      Communicated ALYSE with patient/caregiver No (P)      Discharge Plan A Home with family (P)      Discharge Plan B Home (P)      DME Needed Upon Discharge  none (P)      Transition of Care Barriers None (P)         Post-Acute Status    Coverage [employer] (P)      Discharge Delays None known at this time (P)                  CM spoke with pt in room.  DC plan home with family, she plans to drive home.  She has walker and w/c - no further DME needed.    Pt c/o recent stresses and losses, wants to see a Psychiatrist to get some anti-anxiety meds.  CM instructed that MD makes Psychiatrist referrals, not CM.  For anti anxiety meds, she may be able to just speak with attending MD on rounds and see if he would prescribe some.  Pt v/u, states she will speak with MD.  CM notified Dr. Nixon.  CM offered spiritual care referral.  Pt accepts.  CM ordered spiritual care.    THERESA MartinezN, BS, RN, CCM

## 2024-07-31 NOTE — PROGRESS NOTES
[facility] - Med Surg ([place] [address])  [facility] Medicine  Progress Note    Patient Name: Nazanin Malone  MRN: [id number]  Patient Class: IP- Inpatient   Admission Date: 7/28/2024  Length of Stay: 3 days  Attending Physician: Aftab Nixon MD  Primary Care Provider: Pee Montgomery MD        Subjective:     Principal Problem:Sickle cell pain crisis        HPI:  Nazanin Malone is a 44 year old female with history of SC anemia with history of acute chest, beta thalassemia, HTN, obesity, iron deficiency anemia, hx of PE on apixaban, RLE fasciotomy, and recent hospitalization for bacteremia s/p antibiotic course and recent sickle cell pain crisis that presents with sickle cell pain crisis. Patient reports recurrent pain consistent with her prior sickle cell pain crisis. Her pain started 2-3 days ago which started mostly in her left chest. Pain was constant without relief despite taking her home opiate regimen. Yesterday, she noted pain in her left arm and some shortness of breath. She reports a cough with yellow sputum and felt like she was oscillating between feeling hot and cold as well as low appetite associated with nausea. Denies F/C, abdominal pain, constipation.    Of note, had recent hospitalization for staph epidermis bacteremia s/p 14 day course of IV vancomycin.    Upon arrival to the ED, patient afebrile, HDS, and saturating well on room air. Labs notable for WBC 9, Hgb 11.5 (higher than baseline), K 3.2, trop negative, BNP WNL, blood cultures x2 sent. EKG without ischemic changes. CXR without consolidation.     Overview/Hospital Course:  Patient admitted for sickle cell pain crisis. Infectious work up negative. Started on IV pain medication PRN and IVF. US of left cath/chest obtained due to superficial pain around port. US without fluid collection or mass and pain believed to be secondary to sickle cell pain crisis.     Interval History: Pt seen and examined this morning on thea [place].  Continues to have severe pain secondary to sickle cell crisis. Pain particularly painful around port/catheter and soft tissue US of neck, chest, and left arm which was unremarkable. Cr improved, AST downtrending.      Objective:     Vital Signs (Most Recent):  Temp: 97.9 °F (36.6 °C) (07/31/24 0911)  Pulse: 83 (07/31/24 0911)  Resp: 18 (07/31/24 0911)  BP: 128/64 (07/31/24 0911)  SpO2: 96 % (07/31/24 0911) Vital Signs (24h Range):  Temp:  [97.7 °F (36.5 °C)-98.8 °F (37.1 °C)] 97.9 °F (36.6 °C)  Pulse:  [75-92] 83  Resp:  [16-20] 18  SpO2:  [94 %-100 %] 96 %  BP: (106-132)/(63-86) 128/64     Weight: 93.7 kg (206 lb 9.1 oz)  Body mass index is 37.78 kg/m².    Intake/Output Summary (Last 24 hours) at 7/31/2024 0938  Last data filed at 7/31/2024 0835  Gross per 24 hour   Intake 480 ml   Output 1600 ml   Net -1120 ml         Physical Exam  Gen: in NAD, appears stated age  Neuro: AAOx4, CN2-12 grossly intact BL; motor, sensory, and strength grossly intact BL  HEENT: NTNC, EOMI, PERRLA, MMM; no thyromegaly or lymphadenopathy; no JVD appreciated  CVS: Left Port without erythema or induration. RRR, no m/r/g; S1/S2 auscultated with no S3 or S4; capillary refill < 2 sec  Resp: lungs CTAB, no w/r/r; no belabored breathing or accessory muscle use appreciated   Abd: BS+ in all 4 quadrants; NTND, soft to palpation; no organomegaly appreciated   Extrem: pulses full, equal, and regular over all 4 extremities; no UE or LE edema BL          Significant Labs: All pertinent labs within the past 24 hours have been reviewed.    Significant Imaging: I have reviewed all pertinent imaging results/findings within the past 24 hours.    Assessment/Plan:      * Sickle cell pain crisis  Hb-SS disease with vaso-occlusive pain  - Hgb 11.6 on admit, at baseline  - Reticulocyte count 0.7  - Continue home oxy and MS contin  - start gabapentin, continue home tizanidine PRN  - Dilaudid PRN; wean as able  - Continue IVF to prevent worsening sickling  -  adjust regimen as clinically indicated   - Given continued pain around port, soft tissue US of neck, chest, and left arm obtained which was unremarkable. Likely pain 2/2 sickle cell pain crisis. If worsening pain may need further evaluation with CT    ADRIANO (acute kidney injury)  - Cr elevated to 1.5, baseline 1.0  - Likely 2/2 poor PO intake in setting of sickle cell crisis  - Started on mIVF and Adriano resolved  - Renally dosing all medications  - Avoiding nephrotoxins  - Will continue to monitor on daily labs    - Resolved    Hypokalemia  Patient's most recent potassium results are listed below.   Recent Labs     07/28/24  0837   K 3.2*     Plan  - Replete potassium per protocol  - Monitor potassium Daily  - Patient's hypokalemia is stable    History of pulmonary embolism  - hx noted  - continue home Eliquis     Hypertension  Chronic, controlled. Latest blood pressure and vitals reviewed-     Temp:  [99.1 °F (37.3 °C)]   Pulse:  [86-92]   Resp:  [20]   BP: (121-157)/(82-94)   SpO2:  [97 %-100 %] .   Home meds for hypertension were reviewed and noted below.   Hypertension Medications               amLODIPine (NORVASC) 10 MG tablet Take 1 tablet (10 mg total) by mouth once daily.    carvediloL (COREG) 25 MG tablet Take 1 tablet (25 mg total) by mouth 2 (two) times daily.            While in the hospital, will manage blood pressure as follows; Continue home antihypertensive regimen    Will utilize p.r.n. blood pressure medication only if patient's blood pressure greater than 180/110 and she develops symptoms such as worsening chest pain or shortness of breath.      VTE Risk Mitigation (From admission, onward)           Ordered     apixaban tablet 5 mg  2 times daily         07/28/24 1117     Reason for No Pharmacological VTE Prophylaxis  Once        Question:  Reasons:  Answer:  Already adequately anticoagulated on oral Anticoagulants    07/28/24 1117     IP VTE HIGH RISK PATIENT  Once         07/28/24 1117                     Discharge Planning   ALYSE: 8/2/2024     Code Status: Full Code   Is the patient medically ready for discharge?:     Reason for patient still in hospital (select all that apply): Treatment  Discharge Plan A: Home with family                  Aftab Nixon MD  Department of Hospital Medicine   [facility] Surg ([place]-16)

## 2024-07-31 NOTE — ASSESSMENT & PLAN NOTE
- Cr elevated to 1.5, baseline 1.0  - Likely 2/2 poor PO intake in setting of sickle cell crisis  - Started on mIVF and Adriano resolved  - Renally dosing all medications  - Avoiding nephrotoxins  - Will continue to monitor on daily labs    - Resolved

## 2024-07-31 NOTE — CHAPLAIN
"   24 1748   Clinical Encounter Type   Visit Type Follow-up   Visit Category Spiritual Assessment   Visited With Patient   Length of Visit 45 Minutes   Continue Visiting Yes   Patient Spiritual Encounters   Care Provided Grief care;Explored pt/family concerns;Compassionate presence;Life review/ reflection;Reflective listening   Patient Coping Anxiety;Sadness;Living with uncertainty;Using patricia/ community resources;Family/ friends resources   Comments - Patient Pt. reflected on her life and what is causing her distress currently. She is experiencing anxiety and overwhelm, and asking for pharmaceutical support. She is the caretaker for her mother, who has rapidly advancing Alzheimer's disease. Pt. has 2 daughters, ages 17 & 20. The older one has struggled with drug use and unhealthy relationships w/ men. Pt. 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 She feels blessed to have worked, had children, and traveled as someone who lives with SC. She expressed the desire to find or start a support group for SC, after experiencing few who understand the disease. Pt. said she trusts "God won't give me anything I can't handle," and believes in the power of prayer. She is seeking to find meaning and purpose in the struggle of it all. I encouraged her to tell her story, as it could help others like her, and I gave her some resources. When her pain became unbearable, I offered to give her space and she expressed gratitude and asked me to return. Will F/U.       "

## 2024-08-01 LAB
ALBUMIN SERPL BCP-MCNC: 3.3 G/DL (ref 3.5–5.2)
ALP SERPL-CCNC: 73 U/L (ref 55–135)
ALT SERPL W/O P-5'-P-CCNC: 29 U/L (ref 10–44)
ANION GAP SERPL CALC-SCNC: 5 MMOL/L (ref 8–16)
AST SERPL-CCNC: 29 U/L (ref 10–40)
BASOPHILS # BLD AUTO: 0.03 K/UL (ref 0–0.2)
BASOPHILS NFR BLD: 0.5 % (ref 0–1.9)
BILIRUB SERPL-MCNC: 0.5 MG/DL (ref 0.1–1)
BUN SERPL-MCNC: 19 MG/DL (ref 6–20)
CALCIUM SERPL-MCNC: 9.3 MG/DL (ref 8.7–10.5)
CHLORIDE SERPL-SCNC: 108 MMOL/L (ref 95–110)
CO2 SERPL-SCNC: 27 MMOL/L (ref 23–29)
CREAT SERPL-MCNC: 1.2 MG/DL (ref 0.5–1.4)
DIFFERENTIAL METHOD BLD: ABNORMAL
EOSINOPHIL # BLD AUTO: 0.3 K/UL (ref 0–0.5)
EOSINOPHIL NFR BLD: 5.5 % (ref 0–8)
ERYTHROCYTE [DISTWIDTH] IN BLOOD BY AUTOMATED COUNT: 18.3 % (ref 11.5–14.5)
EST. GFR  (NO RACE VARIABLE): 56.5 ML/MIN/1.73 M^2
GLUCOSE SERPL-MCNC: 91 MG/DL (ref 70–110)
HCT VFR BLD AUTO: 28.5 % (ref 37–48.5)
HGB BLD-MCNC: 9.5 G/DL (ref 12–16)
IMM GRANULOCYTES # BLD AUTO: 0.01 K/UL (ref 0–0.04)
IMM GRANULOCYTES NFR BLD AUTO: 0.2 % (ref 0–0.5)
LYMPHOCYTES # BLD AUTO: 2.7 K/UL (ref 1–4.8)
LYMPHOCYTES NFR BLD: 44.8 % (ref 18–48)
MAGNESIUM SERPL-MCNC: 2 MG/DL (ref 1.6–2.6)
MCH RBC QN AUTO: 23.9 PG (ref 27–31)
MCHC RBC AUTO-ENTMCNC: 33.3 G/DL (ref 32–36)
MCV RBC AUTO: 72 FL (ref 82–98)
MONOCYTES # BLD AUTO: 0.8 K/UL (ref 0.3–1)
MONOCYTES NFR BLD: 12.9 % (ref 4–15)
NEUTROPHILS # BLD AUTO: 2.2 K/UL (ref 1.8–7.7)
NEUTROPHILS NFR BLD: 36.1 % (ref 38–73)
NRBC BLD-RTO: 1 /100 WBC
PHOSPHATE SERPL-MCNC: 3.5 MG/DL (ref 2.7–4.5)
PLATELET # BLD AUTO: 272 K/UL (ref 150–450)
PMV BLD AUTO: 10.3 FL (ref 9.2–12.9)
POTASSIUM SERPL-SCNC: 4.3 MMOL/L (ref 3.5–5.1)
PROT SERPL-MCNC: 6.8 G/DL (ref 6–8.4)
RBC # BLD AUTO: 3.98 M/UL (ref 4–5.4)
SODIUM SERPL-SCNC: 140 MMOL/L (ref 136–145)
WBC # BLD AUTO: 6.03 K/UL (ref 3.9–12.7)

## 2024-08-01 PROCEDURE — 63600175 PHARM REV CODE 636 W HCPCS: Performed by: STUDENT IN AN ORGANIZED HEALTH CARE EDUCATION/TRAINING PROGRAM

## 2024-08-01 PROCEDURE — 85025 COMPLETE CBC W/AUTO DIFF WBC: CPT | Performed by: STUDENT IN AN ORGANIZED HEALTH CARE EDUCATION/TRAINING PROGRAM

## 2024-08-01 PROCEDURE — 84100 ASSAY OF PHOSPHORUS: CPT | Performed by: STUDENT IN AN ORGANIZED HEALTH CARE EDUCATION/TRAINING PROGRAM

## 2024-08-01 PROCEDURE — 25000003 PHARM REV CODE 250: Performed by: STUDENT IN AN ORGANIZED HEALTH CARE EDUCATION/TRAINING PROGRAM

## 2024-08-01 PROCEDURE — 25000003 PHARM REV CODE 250

## 2024-08-01 PROCEDURE — 83735 ASSAY OF MAGNESIUM: CPT | Performed by: STUDENT IN AN ORGANIZED HEALTH CARE EDUCATION/TRAINING PROGRAM

## 2024-08-01 PROCEDURE — 36415 COLL VENOUS BLD VENIPUNCTURE: CPT | Performed by: STUDENT IN AN ORGANIZED HEALTH CARE EDUCATION/TRAINING PROGRAM

## 2024-08-01 PROCEDURE — 80053 COMPREHEN METABOLIC PANEL: CPT | Performed by: STUDENT IN AN ORGANIZED HEALTH CARE EDUCATION/TRAINING PROGRAM

## 2024-08-01 PROCEDURE — 11000001 HC ACUTE MED/SURG PRIVATE ROOM

## 2024-08-01 PROCEDURE — 63600175 PHARM REV CODE 636 W HCPCS: Performed by: INTERNAL MEDICINE

## 2024-08-01 RX ORDER — HYDROMORPHONE HYDROCHLORIDE 2 MG/ML
5 INJECTION, SOLUTION INTRAMUSCULAR; INTRAVENOUS; SUBCUTANEOUS EVERY 4 HOURS PRN
Status: DISCONTINUED | OUTPATIENT
Start: 2024-08-01 | End: 2024-08-01

## 2024-08-01 RX ORDER — HYDROMORPHONE HYDROCHLORIDE 2 MG/ML
4 INJECTION, SOLUTION INTRAMUSCULAR; INTRAVENOUS; SUBCUTANEOUS EVERY 4 HOURS PRN
Status: DISCONTINUED | OUTPATIENT
Start: 2024-08-01 | End: 2024-08-05

## 2024-08-01 RX ORDER — HYDROMORPHONE HYDROCHLORIDE 2 MG/ML
2 INJECTION, SOLUTION INTRAMUSCULAR; INTRAVENOUS; SUBCUTANEOUS EVERY 4 HOURS PRN
Status: DISCONTINUED | OUTPATIENT
Start: 2024-08-01 | End: 2024-08-05

## 2024-08-01 RX ORDER — ACETAMINOPHEN 325 MG/1
650 TABLET ORAL 3 TIMES DAILY
Status: DISCONTINUED | OUTPATIENT
Start: 2024-08-01 | End: 2024-08-02

## 2024-08-01 RX ORDER — HYDROMORPHONE HYDROCHLORIDE 1 MG/ML
1 INJECTION, SOLUTION INTRAMUSCULAR; INTRAVENOUS; SUBCUTANEOUS EVERY 4 HOURS PRN
Status: DISCONTINUED | OUTPATIENT
Start: 2024-08-01 | End: 2024-08-01

## 2024-08-01 RX ORDER — ROPIVACAINE HYDROCHLORIDE 5 MG/ML
INJECTION, SOLUTION EPIDURAL; INFILTRATION; PERINEURAL ONCE
Status: CANCELLED | OUTPATIENT
Start: 2024-08-01 | End: 2024-08-01

## 2024-08-01 RX ORDER — BUPIVACAINE HYDROCHLORIDE 5 MG/ML
INJECTION, SOLUTION PERINEURAL ONCE
Status: CANCELLED | OUTPATIENT
Start: 2024-08-01 | End: 2024-08-01

## 2024-08-01 RX ORDER — CLONIDINE 100 UG/ML
150 INJECTION, SOLUTION EPIDURAL ONCE
Status: CANCELLED | OUTPATIENT
Start: 2024-08-01 | End: 2024-08-01

## 2024-08-01 RX ORDER — LACTULOSE 10 G/15ML
20 SOLUTION ORAL NIGHTLY
Status: COMPLETED | OUTPATIENT
Start: 2024-08-01 | End: 2024-08-01

## 2024-08-01 RX ADMIN — DIPHENHYDRAMINE HYDROCHLORIDE 25 MG: 50 INJECTION, SOLUTION INTRAMUSCULAR; INTRAVENOUS at 11:08

## 2024-08-01 RX ADMIN — OXYCODONE HYDROCHLORIDE 15 MG: 10 TABLET ORAL at 08:08

## 2024-08-01 RX ADMIN — FLUOXETINE HYDROCHLORIDE 40 MG: 20 CAPSULE ORAL at 08:08

## 2024-08-01 RX ADMIN — APIXABAN 5 MG: 5 TABLET, FILM COATED ORAL at 08:08

## 2024-08-01 RX ADMIN — MORPHINE SULFATE 15 MG: 15 TABLET, EXTENDED RELEASE ORAL at 08:08

## 2024-08-01 RX ADMIN — DIPHENHYDRAMINE HYDROCHLORIDE 25 MG: 50 INJECTION, SOLUTION INTRAMUSCULAR; INTRAVENOUS at 10:08

## 2024-08-01 RX ADMIN — APIXABAN 5 MG: 5 TABLET, FILM COATED ORAL at 09:08

## 2024-08-01 RX ADMIN — HYDROMORPHONE HYDROCHLORIDE 2 MG: 2 INJECTION INTRAMUSCULAR; INTRAVENOUS; SUBCUTANEOUS at 11:08

## 2024-08-01 RX ADMIN — DIPHENHYDRAMINE HYDROCHLORIDE 25 MG: 50 INJECTION, SOLUTION INTRAMUSCULAR; INTRAVENOUS at 04:08

## 2024-08-01 RX ADMIN — FOLIC ACID 1 MG: 1 TABLET ORAL at 08:08

## 2024-08-01 RX ADMIN — GABAPENTIN 600 MG: 300 CAPSULE ORAL at 09:08

## 2024-08-01 RX ADMIN — OXYCODONE HYDROCHLORIDE AND ACETAMINOPHEN 1 TABLET: 10; 325 TABLET ORAL at 02:08

## 2024-08-01 RX ADMIN — MORPHINE SULFATE 15 MG: 15 TABLET, EXTENDED RELEASE ORAL at 09:08

## 2024-08-01 RX ADMIN — TIZANIDINE 4 MG: 2 TABLET ORAL at 04:08

## 2024-08-01 RX ADMIN — LACTULOSE 20 G: 20 SOLUTION ORAL at 10:08

## 2024-08-01 RX ADMIN — PROMETHAZINE HYDROCHLORIDE 25 MG: 12.5 TABLET ORAL at 09:08

## 2024-08-01 RX ADMIN — ACETAMINOPHEN 650 MG: 325 TABLET ORAL at 03:08

## 2024-08-01 RX ADMIN — HYDROMORPHONE HYDROCHLORIDE 4 MG: 2 INJECTION INTRAMUSCULAR; INTRAVENOUS; SUBCUTANEOUS at 09:08

## 2024-08-01 RX ADMIN — PROMETHAZINE HYDROCHLORIDE 25 MG: 12.5 TABLET ORAL at 04:08

## 2024-08-01 RX ADMIN — OXYCODONE HYDROCHLORIDE AND ACETAMINOPHEN 1 TABLET: 10; 325 TABLET ORAL at 10:08

## 2024-08-01 RX ADMIN — HYDROMORPHONE HYDROCHLORIDE 4 MG: 2 INJECTION INTRAMUSCULAR; INTRAVENOUS; SUBCUTANEOUS at 03:08

## 2024-08-01 RX ADMIN — HYDROMORPHONE HYDROCHLORIDE 4 MG: 2 INJECTION INTRAMUSCULAR; INTRAVENOUS; SUBCUTANEOUS at 04:08

## 2024-08-01 RX ADMIN — HYDROMORPHONE HYDROCHLORIDE 2 MG: 2 INJECTION INTRAMUSCULAR; INTRAVENOUS; SUBCUTANEOUS at 04:08

## 2024-08-01 RX ADMIN — ACETAMINOPHEN 650 MG: 325 TABLET ORAL at 09:08

## 2024-08-01 NOTE — SUBJECTIVE & OBJECTIVE
Interval History:   No events overnight. Patient with unchanged sickle cell pain. No other complaints. Pain meds up titrated. Continue IVF.       Review of Systems   Constitutional:  Negative for activity change, appetite change, chills, diaphoresis, fatigue and fever.   HENT:  Negative for rhinorrhea and sore throat.    Respiratory:  Negative for cough, chest tightness and shortness of breath.    Cardiovascular:  Negative for chest pain and palpitations.   Gastrointestinal:  Negative for abdominal pain, constipation, diarrhea and nausea.   Endocrine: Negative for cold intolerance.   Genitourinary:  Negative for decreased urine volume and dysuria.   Musculoskeletal:  Positive for arthralgias (left chest/arm) and myalgias.   Skin:  Negative for rash and wound.   Neurological:  Negative for dizziness, weakness, numbness and headaches.   Psychiatric/Behavioral:  Negative for agitation, behavioral problems and confusion.      Objective:     Vital Signs (Most Recent):  Temp: 98.5 °F (36.9 °C) (08/01/24 1224)  Pulse: 75 (08/01/24 1444)  Resp: 19 (08/01/24 1504)  BP: 111/61 (08/01/24 1224)  SpO2: 98 % (08/01/24 1345) Vital Signs (24h Range):  Temp:  [98.1 °F (36.7 °C)-99 °F (37.2 °C)] 98.5 °F (36.9 °C)  Pulse:  [68-78] 75  Resp:  [17-20] 19  SpO2:  [88 %-100 %] 98 %  BP: ()/(53-75) 111/61     Weight: 93.7 kg (206 lb 9.1 oz)  Body mass index is 37.78 kg/m².  No intake or output data in the 24 hours ending 08/01/24 1516      Physical Exam  Constitutional:       General: She is not in acute distress.     Appearance: Normal appearance. She is obese.   HENT:      Head: Normocephalic and atraumatic.      Mouth/Throat:      Mouth: Mucous membranes are moist.   Eyes:      Extraocular Movements: Extraocular movements intact.      Conjunctiva/sclera: Conjunctivae normal.   Cardiovascular:      Rate and Rhythm: Normal rate and regular rhythm.   Pulmonary:      Effort: Pulmonary effort is normal. No respiratory distress.       Breath sounds: Normal breath sounds. No wheezing or rales.   Abdominal:      General: Abdomen is flat. Bowel sounds are normal.      Palpations: Abdomen is soft.      Tenderness: There is no abdominal tenderness. There is no guarding.   Musculoskeletal:         General: No swelling or tenderness.   Skin:     Findings: No rash.      Comments: Bilateral chest port sites without erythema     Neurological:      General: No focal deficit present.      Mental Status: She is alert and oriented to person, place, and time. Mental status is at baseline.   Psychiatric:         Mood and Affect: Mood normal.         Behavior: Behavior normal.             Significant Labs: All pertinent labs within the past 24 hours have been reviewed.  CBC:   Recent Labs   Lab 07/31/24  0511 08/01/24  0906   WBC 6.66 6.03   HGB 8.9* 9.5*   HCT 26.9* 28.5*    272     CMP:   Recent Labs   Lab 07/31/24  0511 08/01/24  0906    140   K 3.9 4.3    108   CO2 27 27   GLU 70 91   BUN 18 19   CREATININE 1.1 1.2   CALCIUM 8.9 9.3   PROT 6.3 6.8   ALBUMIN 3.1* 3.3*   BILITOT 0.5 0.5   ALKPHOS 68 73   AST 45* 29   ALT 39 29   ANIONGAP 6* 5*       Significant Imaging: I have reviewed all pertinent imaging results/findings within the past 24 hours.

## 2024-08-01 NOTE — ASSESSMENT & PLAN NOTE
Hb-SS disease with vaso-occlusive pain  Patient recently moved from [place] to [place] in June 2024. Patient reported receiving RBC exchanges every 6 week via two ports in her chest for management of her sickle cell disease. Has yet to establish care in [place].     - Hgb 11.6 on admit, at baseline  - Reticulocyte count 0.7  - Continue home MS contin and tizanidine PRN  - Gabapentin, APAP,  - Oxycodone 15mg PRN  - Dilaudid PRN; wean as able  - Continue IVF to prevent worsening sickling  - Adjust regimen as clinically indicated   - Given continued pain around port, soft tissue US of neck, chest, and left arm obtained which was unremarkable. Likely pain 2/2 sickle cell pain crisis. If worsening pain may need further evaluation with CT

## 2024-08-01 NOTE — PROGRESS NOTES
[facility] ([place] [id number])  [facility] Medicine  Progress Note    Patient Name: Nazanin Malone  MRN: [id number]  Patient Class: IP- Inpatient   Admission Date: 7/28/2024  Length of Stay: 4 days  Attending Physician: Lenin Steward MD  Primary Care Provider: Pee Montgomery MD        Subjective:     Principal Problem:Sickle cell pain crisis        HPI:  Nazanin Malone is a 44 year old female with history of SC anemia with history of acute chest, beta thalassemia, HTN, obesity, iron deficiency anemia, hx of PE on apixaban, RLE fasciotomy, and recent hospitalization for bacteremia s/p antibiotic course and recent sickle cell pain crisis that presents with sickle cell pain crisis. Patient reports recurrent pain consistent with her prior sickle cell pain crisis. Her pain started 2-3 days ago which started mostly in her left chest. Pain was constant without relief despite taking her home opiate regimen. Yesterday, she noted pain in her left arm and some shortness of breath. She reports a cough with yellow sputum and felt like she was oscillating between feeling hot and cold as well as low appetite associated with nausea. Denies F/C, abdominal pain, constipation.    Of note, had recent hospitalization for staph epidermis bacteremia s/p 14 day course of IV vancomycin.    Upon arrival to the ED, patient afebrile, HDS, and saturating well on room air. Labs notable for WBC 9, Hgb 11.5 (higher than baseline), K 3.2, trop negative, BNP WNL, blood cultures x2 sent. EKG without ischemic changes. CXR without consolidation.     Overview/Hospital Course:  Patient admitted for sickle cell pain crisis. Infectious work up negative. Started on IV pain medication PRN and IVF. US of left cath/chest obtained due to superficial pain around port. US without fluid collection or mass and pain believed to be secondary to sickle cell pain crisis.     Interval History:   No events overnight. Patient with unchanged sickle  cell pain. No other complaints. Pain meds up titrated. Continue IVF.       Review of Systems   Constitutional:  Negative for activity change, appetite change, chills, diaphoresis, fatigue and fever.   HENT:  Negative for rhinorrhea and sore throat.    Respiratory:  Negative for cough, chest tightness and shortness of breath.    Cardiovascular:  Negative for chest pain and palpitations.   Gastrointestinal:  Negative for abdominal pain, constipation, diarrhea and nausea.   Endocrine: Negative for cold intolerance.   Genitourinary:  Negative for decreased urine volume and dysuria.   Musculoskeletal:  Positive for arthralgias (left chest/arm) and myalgias.   Skin:  Negative for rash and wound.   Neurological:  Negative for dizziness, weakness, numbness and headaches.   Psychiatric/Behavioral:  Negative for agitation, behavioral problems and confusion.      Objective:     Vital Signs (Most Recent):  Temp: 98.5 °F (36.9 °C) (08/01/24 1224)  Pulse: 75 (08/01/24 1444)  Resp: 19 (08/01/24 1504)  BP: 111/61 (08/01/24 1224)  SpO2: 98 % (08/01/24 1345) Vital Signs (24h Range):  Temp:  [98.1 °F (36.7 °C)-99 °F (37.2 °C)] 98.5 °F (36.9 °C)  Pulse:  [68-78] 75  Resp:  [17-20] 19  SpO2:  [88 %-100 %] 98 %  BP: ()/(53-75) 111/61     Weight: 93.7 kg (206 lb 9.1 oz)  Body mass index is 37.78 kg/m².  No intake or output data in the 24 hours ending 08/01/24 1516      Physical Exam  Constitutional:       General: She is not in acute distress.     Appearance: Normal appearance. She is obese.   HENT:      Head: Normocephalic and atraumatic.      Mouth/Throat:      Mouth: Mucous membranes are moist.   Eyes:      Extraocular Movements: Extraocular movements intact.      Conjunctiva/sclera: Conjunctivae normal.   Cardiovascular:      Rate and Rhythm: Normal rate and regular rhythm.   Pulmonary:      Effort: Pulmonary effort is normal. No respiratory distress.      Breath sounds: Normal breath sounds. No wheezing or rales.   Abdominal:       General: Abdomen is flat. Bowel sounds are normal.      Palpations: Abdomen is soft.      Tenderness: There is no abdominal tenderness. There is no guarding.   Musculoskeletal:         General: No swelling or tenderness.   Skin:     Findings: No rash.      Comments: Bilateral chest port sites without erythema     Neurological:      General: No focal deficit present.      Mental Status: She is alert and oriented to person, place, and time. Mental status is at baseline.   Psychiatric:         Mood and Affect: Mood normal.         Behavior: Behavior normal.             Significant Labs: All pertinent labs within the past 24 hours have been reviewed.  CBC:   Recent Labs   Lab 07/31/24  0511 08/01/24  0906   WBC 6.66 6.03   HGB 8.9* 9.5*   HCT 26.9* 28.5*    272     CMP:   Recent Labs   Lab 07/31/24  0511 08/01/24  0906    140   K 3.9 4.3    108   CO2 27 27   GLU 70 91   BUN 18 19   CREATININE 1.1 1.2   CALCIUM 8.9 9.3   PROT 6.3 6.8   ALBUMIN 3.1* 3.3*   BILITOT 0.5 0.5   ALKPHOS 68 73   AST 45* 29   ALT 39 29   ANIONGAP 6* 5*       Significant Imaging: I have reviewed all pertinent imaging results/findings within the past 24 hours.    Assessment/Plan:      * Sickle cell pain crisis  Hb-SS disease with vaso-occlusive pain  Patient recently moved from [place] to [place] in June 2024. Patient reported receiving RBC exchanges every 6 week via two ports in her chest for management of her sickle cell disease. Has yet to establish care in [place].     - Hgb 11.6 on admit, at baseline  - Reticulocyte count 0.7  - Continue home MS contin and tizanidine PRN  - Gabapentin, APAP,  - Oxycodone 15mg PRN  - Dilaudid PRN; wean as able  - Continue IVF to prevent worsening sickling  - Adjust regimen as clinically indicated   - Given continued pain around port, soft tissue US of neck, chest, and left arm obtained which was unremarkable. Likely pain 2/2 sickle cell pain crisis. If worsening pain may need  further evaluation with CT    FLOR (acute kidney injury)  - Cr elevated to 1.5, baseline 1.0  - Likely 2/2 poor PO intake in setting of sickle cell crisis  - Improved with IVF  - Renally dosing all medications  - Avoiding nephrotoxins  - Will continue to monitor on daily labs  - Improved    Hypokalemia  Patient's most recent potassium results are listed below.   Recent Labs     07/28/24  0837   K 3.2*     Plan  - Replete potassium per protocol  - Monitor potassium Daily  - Patient's hypokalemia is stable    History of pulmonary embolism  - hx noted  - continue home Eliquis     Hypertension  Chronic, controlled. Latest blood pressure and vitals reviewed-     Temp:  [99.1 °F (37.3 °C)]   Pulse:  [86-92]   Resp:  [20]   BP: (121-157)/(82-94)   SpO2:  [97 %-100 %] .   Home meds for hypertension were reviewed and noted below.   Hypertension Medications               amLODIPine (NORVASC) 10 MG tablet Take 1 tablet (10 mg total) by mouth once daily.    carvediloL (COREG) 25 MG tablet Take 1 tablet (25 mg total) by mouth 2 (two) times daily.            While in the hospital, will manage blood pressure as follows; Continue home antihypertensive regimen    Will utilize p.r.n. blood pressure medication only if patient's blood pressure greater than 180/110 and she develops symptoms such as worsening chest pain or shortness of breath.      VTE Risk Mitigation (From admission, onward)           Ordered     apixaban tablet 5 mg  2 times daily         07/28/24 1117     Reason for No Pharmacological VTE Prophylaxis  Once        Question:  Reasons:  Answer:  Already adequately anticoagulated on oral Anticoagulants    07/28/24 1117     IP VTE HIGH RISK PATIENT  Once         07/28/24 1117                    Discharge Planning   ALYSE: 8/2/2024     Code Status: Full Code   Is the patient medically ready for discharge?:     Reason for patient still in hospital (select all that apply): Patient trending condition, Laboratory test, Treatment,  and Pending disposition  Discharge Plan A: Home with family   Discharge Delays: None known at this time              Lenin Steward MD  Department of Hospital Medicine   [facility] ([place]

## 2024-08-01 NOTE — ASSESSMENT & PLAN NOTE
- Cr elevated to 1.5, baseline 1.0  - Likely 2/2 poor PO intake in setting of sickle cell crisis  - Improved with IVF  - Renally dosing all medications  - Avoiding nephrotoxins  - Will continue to monitor on daily labs  - Improved

## 2024-08-02 LAB
ALBUMIN SERPL BCP-MCNC: 3.3 G/DL (ref 3.5–5.2)
ALP SERPL-CCNC: 72 U/L (ref 55–135)
ALT SERPL W/O P-5'-P-CCNC: 22 U/L (ref 10–44)
ANION GAP SERPL CALC-SCNC: 7 MMOL/L (ref 8–16)
AST SERPL-CCNC: 22 U/L (ref 10–40)
BACTERIA BLD CULT: NORMAL
BACTERIA BLD CULT: NORMAL
BASOPHILS # BLD AUTO: 0.03 K/UL (ref 0–0.2)
BASOPHILS NFR BLD: 0.5 % (ref 0–1.9)
BILIRUB SERPL-MCNC: 0.5 MG/DL (ref 0.1–1)
BUN SERPL-MCNC: 23 MG/DL (ref 6–20)
CALCIUM SERPL-MCNC: 9.6 MG/DL (ref 8.7–10.5)
CHLORIDE SERPL-SCNC: 109 MMOL/L (ref 95–110)
CO2 SERPL-SCNC: 23 MMOL/L (ref 23–29)
CREAT SERPL-MCNC: 1.4 MG/DL (ref 0.5–1.4)
DIFFERENTIAL METHOD BLD: ABNORMAL
EOSINOPHIL # BLD AUTO: 0.4 K/UL (ref 0–0.5)
EOSINOPHIL NFR BLD: 6.1 % (ref 0–8)
ERYTHROCYTE [DISTWIDTH] IN BLOOD BY AUTOMATED COUNT: 18.3 % (ref 11.5–14.5)
EST. GFR  (NO RACE VARIABLE): 47 ML/MIN/1.73 M^2
GLUCOSE SERPL-MCNC: 79 MG/DL (ref 70–110)
HCT VFR BLD AUTO: 27.5 % (ref 37–48.5)
HGB BLD-MCNC: 8.9 G/DL (ref 12–16)
IMM GRANULOCYTES # BLD AUTO: 0.01 K/UL (ref 0–0.04)
IMM GRANULOCYTES NFR BLD AUTO: 0.2 % (ref 0–0.5)
LYMPHOCYTES # BLD AUTO: 3 K/UL (ref 1–4.8)
LYMPHOCYTES NFR BLD: 44.8 % (ref 18–48)
MAGNESIUM SERPL-MCNC: 2.1 MG/DL (ref 1.6–2.6)
MCH RBC QN AUTO: 23.7 PG (ref 27–31)
MCHC RBC AUTO-ENTMCNC: 32.4 G/DL (ref 32–36)
MCV RBC AUTO: 73 FL (ref 82–98)
MONOCYTES # BLD AUTO: 1 K/UL (ref 0.3–1)
MONOCYTES NFR BLD: 15.8 % (ref 4–15)
NEUTROPHILS # BLD AUTO: 2.2 K/UL (ref 1.8–7.7)
NEUTROPHILS NFR BLD: 32.6 % (ref 38–73)
NRBC BLD-RTO: 1 /100 WBC
PHOSPHATE SERPL-MCNC: 3.6 MG/DL (ref 2.7–4.5)
PLATELET # BLD AUTO: 246 K/UL (ref 150–450)
PMV BLD AUTO: 10.4 FL (ref 9.2–12.9)
POTASSIUM SERPL-SCNC: 4.1 MMOL/L (ref 3.5–5.1)
PROT SERPL-MCNC: 6.8 G/DL (ref 6–8.4)
RBC # BLD AUTO: 3.75 M/UL (ref 4–5.4)
SODIUM SERPL-SCNC: 139 MMOL/L (ref 136–145)
WBC # BLD AUTO: 6.59 K/UL (ref 3.9–12.7)

## 2024-08-02 PROCEDURE — 85025 COMPLETE CBC W/AUTO DIFF WBC: CPT | Performed by: STUDENT IN AN ORGANIZED HEALTH CARE EDUCATION/TRAINING PROGRAM

## 2024-08-02 PROCEDURE — 11000001 HC ACUTE MED/SURG PRIVATE ROOM

## 2024-08-02 PROCEDURE — 63600175 PHARM REV CODE 636 W HCPCS: Performed by: INTERNAL MEDICINE

## 2024-08-02 PROCEDURE — 36415 COLL VENOUS BLD VENIPUNCTURE: CPT | Performed by: STUDENT IN AN ORGANIZED HEALTH CARE EDUCATION/TRAINING PROGRAM

## 2024-08-02 PROCEDURE — 84100 ASSAY OF PHOSPHORUS: CPT | Performed by: STUDENT IN AN ORGANIZED HEALTH CARE EDUCATION/TRAINING PROGRAM

## 2024-08-02 PROCEDURE — 25000003 PHARM REV CODE 250

## 2024-08-02 PROCEDURE — 25000003 PHARM REV CODE 250: Performed by: STUDENT IN AN ORGANIZED HEALTH CARE EDUCATION/TRAINING PROGRAM

## 2024-08-02 PROCEDURE — 83735 ASSAY OF MAGNESIUM: CPT | Performed by: STUDENT IN AN ORGANIZED HEALTH CARE EDUCATION/TRAINING PROGRAM

## 2024-08-02 PROCEDURE — 80053 COMPREHEN METABOLIC PANEL: CPT | Performed by: STUDENT IN AN ORGANIZED HEALTH CARE EDUCATION/TRAINING PROGRAM

## 2024-08-02 PROCEDURE — 63600175 PHARM REV CODE 636 W HCPCS: Performed by: STUDENT IN AN ORGANIZED HEALTH CARE EDUCATION/TRAINING PROGRAM

## 2024-08-02 RX ORDER — SENNOSIDES 8.6 MG/1
8.6 TABLET ORAL DAILY
Status: DISCONTINUED | OUTPATIENT
Start: 2024-08-03 | End: 2024-08-16 | Stop reason: HOSPADM

## 2024-08-02 RX ORDER — DIPHENHYDRAMINE HYDROCHLORIDE 50 MG/ML
50 INJECTION INTRAMUSCULAR; INTRAVENOUS EVERY 6 HOURS PRN
Status: DISCONTINUED | OUTPATIENT
Start: 2024-08-02 | End: 2024-08-04

## 2024-08-02 RX ORDER — ACETAMINOPHEN 500 MG
1000 TABLET ORAL 3 TIMES DAILY
Status: DISCONTINUED | OUTPATIENT
Start: 2024-08-02 | End: 2024-08-16 | Stop reason: HOSPADM

## 2024-08-02 RX ORDER — MORPHINE SULFATE 15 MG/1
15 TABLET, FILM COATED, EXTENDED RELEASE ORAL EVERY 8 HOURS
Status: DISCONTINUED | OUTPATIENT
Start: 2024-08-02 | End: 2024-08-06

## 2024-08-02 RX ADMIN — TIZANIDINE 4 MG: 2 TABLET ORAL at 09:08

## 2024-08-02 RX ADMIN — CARVEDILOL 25 MG: 25 TABLET, FILM COATED ORAL at 08:08

## 2024-08-02 RX ADMIN — APIXABAN 5 MG: 5 TABLET, FILM COATED ORAL at 09:08

## 2024-08-02 RX ADMIN — HYDROMORPHONE HYDROCHLORIDE 2 MG: 2 INJECTION INTRAMUSCULAR; INTRAVENOUS; SUBCUTANEOUS at 03:08

## 2024-08-02 RX ADMIN — PROMETHAZINE HYDROCHLORIDE 25 MG: 12.5 TABLET ORAL at 09:08

## 2024-08-02 RX ADMIN — DIPHENHYDRAMINE HYDROCHLORIDE 50 MG: 50 INJECTION, SOLUTION INTRAMUSCULAR; INTRAVENOUS at 04:08

## 2024-08-02 RX ADMIN — FLUOXETINE HYDROCHLORIDE 40 MG: 20 CAPSULE ORAL at 08:08

## 2024-08-02 RX ADMIN — APIXABAN 5 MG: 5 TABLET, FILM COATED ORAL at 08:08

## 2024-08-02 RX ADMIN — DIPHENHYDRAMINE HYDROCHLORIDE 50 MG: 50 INJECTION, SOLUTION INTRAMUSCULAR; INTRAVENOUS at 09:08

## 2024-08-02 RX ADMIN — Medication 6 MG: at 09:08

## 2024-08-02 RX ADMIN — TIZANIDINE 4 MG: 2 TABLET ORAL at 02:08

## 2024-08-02 RX ADMIN — DIPHENHYDRAMINE HYDROCHLORIDE 25 MG: 50 INJECTION, SOLUTION INTRAMUSCULAR; INTRAVENOUS at 08:08

## 2024-08-02 RX ADMIN — ACETAMINOPHEN 650 MG: 325 TABLET ORAL at 08:08

## 2024-08-02 RX ADMIN — ACETAMINOPHEN 1000 MG: 325 TABLET ORAL at 09:08

## 2024-08-02 RX ADMIN — OXYCODONE HYDROCHLORIDE 15 MG: 10 TABLET ORAL at 08:08

## 2024-08-02 RX ADMIN — ACETAMINOPHEN 1000 MG: 325 TABLET ORAL at 04:08

## 2024-08-02 RX ADMIN — Medication 6 MG: at 02:08

## 2024-08-02 RX ADMIN — MORPHINE SULFATE 15 MG: 15 TABLET, EXTENDED RELEASE ORAL at 10:08

## 2024-08-02 RX ADMIN — AMLODIPINE BESYLATE 10 MG: 10 TABLET ORAL at 08:08

## 2024-08-02 RX ADMIN — MORPHINE SULFATE 15 MG: 15 TABLET, EXTENDED RELEASE ORAL at 04:08

## 2024-08-02 RX ADMIN — GABAPENTIN 600 MG: 300 CAPSULE ORAL at 09:08

## 2024-08-02 RX ADMIN — HYDROMORPHONE HYDROCHLORIDE 4 MG: 2 INJECTION INTRAMUSCULAR; INTRAVENOUS; SUBCUTANEOUS at 08:08

## 2024-08-02 RX ADMIN — HYDROMORPHONE HYDROCHLORIDE 4 MG: 2 INJECTION INTRAMUSCULAR; INTRAVENOUS; SUBCUTANEOUS at 02:08

## 2024-08-02 RX ADMIN — MORPHINE SULFATE 15 MG: 15 TABLET, EXTENDED RELEASE ORAL at 08:08

## 2024-08-02 RX ADMIN — OXYCODONE HYDROCHLORIDE 15 MG: 10 TABLET ORAL at 09:08

## 2024-08-02 RX ADMIN — HYDROMORPHONE HYDROCHLORIDE 4 MG: 2 INJECTION INTRAMUSCULAR; INTRAVENOUS; SUBCUTANEOUS at 04:08

## 2024-08-02 RX ADMIN — CARVEDILOL 25 MG: 25 TABLET, FILM COATED ORAL at 09:08

## 2024-08-02 RX ADMIN — FOLIC ACID 1 MG: 1 TABLET ORAL at 08:08

## 2024-08-02 RX ADMIN — HYDROMORPHONE HYDROCHLORIDE 2 MG: 2 INJECTION INTRAMUSCULAR; INTRAVENOUS; SUBCUTANEOUS at 11:08

## 2024-08-02 RX ADMIN — HYDROMORPHONE HYDROCHLORIDE 4 MG: 2 INJECTION INTRAMUSCULAR; INTRAVENOUS; SUBCUTANEOUS at 09:08

## 2024-08-02 NOTE — SUBJECTIVE & OBJECTIVE
Interval History:   No events overnight. Patient with headache noted around her eyes, and notes this with prior crisis. Reports poor pain control still requiring IV pain meds MS contin increased. No other complaints.       Review of Systems   Constitutional:  Negative for activity change, appetite change, chills, diaphoresis, fatigue and fever.   HENT:  Negative for rhinorrhea and sore throat.    Respiratory:  Negative for cough, chest tightness and shortness of breath.    Cardiovascular:  Negative for chest pain and palpitations.   Gastrointestinal:  Negative for abdominal pain, constipation, diarrhea and nausea.   Endocrine: Negative for cold intolerance.   Genitourinary:  Negative for decreased urine volume and dysuria.   Musculoskeletal:  Positive for arthralgias (left chest/arm) and myalgias.   Skin:  Negative for rash and wound.   Neurological:  Positive for headaches. Negative for dizziness, weakness and numbness.   Psychiatric/Behavioral:  Negative for agitation, behavioral problems and confusion.      Objective:     Vital Signs (Most Recent):  Temp: 98.7 °F (37.1 °C) (08/02/24 1110)  Pulse: 60 (08/02/24 1523)  Resp: 18 (08/02/24 0953)  BP: 94/66 (08/02/24 1110)  SpO2: 97 % (08/02/24 1523) Vital Signs (24h Range):  Temp:  [98.6 °F (37 °C)-98.9 °F (37.2 °C)] 98.7 °F (37.1 °C)  Pulse:  [60-86] 60  Resp:  [18-19] 18  SpO2:  [95 %-100 %] 97 %  BP: ()/(66-84) 94/66     Weight: 93.7 kg (206 lb 9.1 oz)  Body mass index is 37.78 kg/m².    Intake/Output Summary (Last 24 hours) at 8/2/2024 1542  Last data filed at 8/2/2024 0818  Gross per 24 hour   Intake 240 ml   Output 1350 ml   Net -1110 ml         Physical Exam  Constitutional:       General: She is not in acute distress.     Appearance: Normal appearance. She is obese.   HENT:      Head: Normocephalic and atraumatic.      Mouth/Throat:      Mouth: Mucous membranes are moist.   Eyes:      Extraocular Movements: Extraocular movements intact.       Conjunctiva/sclera: Conjunctivae normal.   Cardiovascular:      Rate and Rhythm: Normal rate and regular rhythm.   Pulmonary:      Effort: Pulmonary effort is normal. No respiratory distress.      Breath sounds: Normal breath sounds. No wheezing or rales.   Abdominal:      General: Abdomen is flat. Bowel sounds are normal.      Palpations: Abdomen is soft.      Tenderness: There is no abdominal tenderness. There is no guarding.   Musculoskeletal:         General: No swelling or tenderness.   Skin:     Findings: No rash.      Comments: Bilateral chest port sites without erythema     Neurological:      General: No focal deficit present.      Mental Status: She is alert and oriented to person, place, and time. Mental status is at baseline.   Psychiatric:         Mood and Affect: Mood normal.         Behavior: Behavior normal.             Significant Labs: All pertinent labs within the past 24 hours have been reviewed.  CBC:   Recent Labs   Lab 08/01/24  0906 08/02/24  0541   WBC 6.03 6.59   HGB 9.5* 8.9*   HCT 28.5* 27.5*    246     CMP:   Recent Labs   Lab 08/01/24  0906 08/02/24  0541    139   K 4.3 4.1    109   CO2 27 23   GLU 91 79   BUN 19 23*   CREATININE 1.2 1.4   CALCIUM 9.3 9.6   PROT 6.8 6.8   ALBUMIN 3.3* 3.3*   BILITOT 0.5 0.5   ALKPHOS 73 72   AST 29 22   ALT 29 22   ANIONGAP 5* 7*       Significant Imaging: I have reviewed all pertinent imaging results/findings within the past 24 hours.

## 2024-08-02 NOTE — PROGRESS NOTES
[facility] ([place] [address])  [facility]  Progress Note    Patient Name: Nazanin Malone  MRN: [id number]  Patient Class: IP- Inpatient   Admission Date: 7/28/2024  Length of Stay: 5 days  Attending Physician: Lenin Steward MD  Primary Care Provider: Pee Montgomery MD        Subjective:     Principal Problem:Sickle cell pain crisis        HPI:  Nazanin Malone is a 44 year old female with history of SC anemia with history of acute chest, beta thalassemia, HTN, obesity, iron deficiency anemia, hx of PE on apixaban, RLE fasciotomy, and recent hospitalization for bacteremia s/p antibiotic course and recent sickle cell pain crisis that presents with sickle cell pain crisis. Patient reports recurrent pain consistent with her prior sickle cell pain crisis. Her pain started 2-3 days ago which started mostly in her left chest. Pain was constant without relief despite taking her home opiate regimen. Yesterday, she noted pain in her left arm and some shortness of breath. She reports a cough with yellow sputum and felt like she was oscillating between feeling hot and cold as well as low appetite associated with nausea. Denies F/C, abdominal pain, constipation.    Of note, had recent hospitalization for staph epidermis bacteremia s/p 14 day course of IV vancomycin.    Upon arrival to the ED, patient afebrile, HDS, and saturating well on room air. Labs notable for WBC 9, Hgb 11.5 (higher than baseline), K 3.2, trop negative, BNP WNL, blood cultures x2 sent. EKG without ischemic changes. CXR without consolidation.     Overview/Hospital Course:  Patient admitted for sickle cell pain crisis. Infectious work up negative. Started on IV pain medication PRN and IVF. US of left cath/chest obtained due to superficial pain around port. US without fluid collection or mass and pain believed to be secondary to sickle cell pain crisis.     Interval History:   No events overnight. Patient with headache noted  around her eyes, and notes this with prior crisis. Reports poor pain control still requiring IV pain meds MS contin increased. No other complaints.       Review of Systems   Constitutional:  Negative for activity change, appetite change, chills, diaphoresis, fatigue and fever.   HENT:  Negative for rhinorrhea and sore throat.    Respiratory:  Negative for cough, chest tightness and shortness of breath.    Cardiovascular:  Negative for chest pain and palpitations.   Gastrointestinal:  Negative for abdominal pain, constipation, diarrhea and nausea.   Endocrine: Negative for cold intolerance.   Genitourinary:  Negative for decreased urine volume and dysuria.   Musculoskeletal:  Positive for arthralgias (left chest/arm) and myalgias.   Skin:  Negative for rash and wound.   Neurological:  Positive for headaches. Negative for dizziness, weakness and numbness.   Psychiatric/Behavioral:  Negative for agitation, behavioral problems and confusion.      Objective:     Vital Signs (Most Recent):  Temp: 98.7 °F (37.1 °C) (08/02/24 1110)  Pulse: 60 (08/02/24 1523)  Resp: 18 (08/02/24 0953)  BP: 94/66 (08/02/24 1110)  SpO2: 97 % (08/02/24 1523) Vital Signs (24h Range):  Temp:  [98.6 °F (37 °C)-98.9 °F (37.2 °C)] 98.7 °F (37.1 °C)  Pulse:  [60-86] 60  Resp:  [18-19] 18  SpO2:  [95 %-100 %] 97 %  BP: ()/(66-84) 94/66     Weight: 93.7 kg (206 lb 9.1 oz)  Body mass index is 37.78 kg/m².    Intake/Output Summary (Last 24 hours) at 8/2/2024 1542  Last data filed at 8/2/2024 0818  Gross per 24 hour   Intake 240 ml   Output 1350 ml   Net -1110 ml         Physical Exam  Constitutional:       General: She is not in acute distress.     Appearance: Normal appearance. She is obese.   HENT:      Head: Normocephalic and atraumatic.      Mouth/Throat:      Mouth: Mucous membranes are moist.   Eyes:      Extraocular Movements: Extraocular movements intact.      Conjunctiva/sclera: Conjunctivae normal.   Cardiovascular:      Rate and Rhythm:  Normal rate and regular rhythm.   Pulmonary:      Effort: Pulmonary effort is normal. No respiratory distress.      Breath sounds: Normal breath sounds. No wheezing or rales.   Abdominal:      General: Abdomen is flat. Bowel sounds are normal.      Palpations: Abdomen is soft.      Tenderness: There is no abdominal tenderness. There is no guarding.   Musculoskeletal:         General: No swelling or tenderness.   Skin:     Findings: No rash.      Comments: Bilateral chest port sites without erythema     Neurological:      General: No focal deficit present.      Mental Status: She is alert and oriented to person, place, and time. Mental status is at baseline.   Psychiatric:         Mood and Affect: Mood normal.         Behavior: Behavior normal.             Significant Labs: All pertinent labs within the past 24 hours have been reviewed.  CBC:   Recent Labs   Lab 08/01/24  0906 08/02/24  0541   WBC 6.03 6.59   HGB 9.5* 8.9*   HCT 28.5* 27.5*    246     CMP:   Recent Labs   Lab 08/01/24  0906 08/02/24  0541    139   K 4.3 4.1    109   CO2 27 23   GLU 91 79   BUN 19 23*   CREATININE 1.2 1.4   CALCIUM 9.3 9.6   PROT 6.8 6.8   ALBUMIN 3.3* 3.3*   BILITOT 0.5 0.5   ALKPHOS 73 72   AST 29 22   ALT 29 22   ANIONGAP 5* 7*       Significant Imaging: I have reviewed all pertinent imaging results/findings within the past 24 hours.    Assessment/Plan:      * Sickle cell pain crisis  Hb-SS disease with vaso-occlusive pain  Patient recently moved from [place] to [place] in June 2024. Patient reported receiving RBC exchanges every 6 week via two ports in her chest for management of her sickle cell disease. Has yet to establish care in [place].     - Hgb 11.6 on admit, at baseline  - Reticulocyte count 0.7  - Continue MS contin TID  and tizanidine PRN  - Gabapentin, APAP,  - Oxycodone 15mg PRN  - Dilaudid PRN; wean as able  - Continue IVF to prevent worsening sickling  - Adjust regimen as clinically indicated    - Given continued pain around port, soft tissue US of neck, chest, and left arm obtained which was unremarkable. Likely pain 2/2 sickle cell pain crisis. If worsening pain may need further evaluation with CT    FLOR (acute kidney injury)  - Cr elevated to 1.5, baseline 1.0  - Likely 2/2 poor PO intake in setting of sickle cell crisis  - Improved with IVF  - Renally dosing all medications  - Avoiding nephrotoxins  - Will continue to monitor on daily labs  - Improved    Hypokalemia  Patient's most recent potassium results are listed below.   Recent Labs     07/28/24  0837   K 3.2*     Plan  - Replete potassium per protocol  - Monitor potassium Daily  - Patient's hypokalemia is stable    History of pulmonary embolism  - hx noted  - continue home Eliquis     Hypertension  Chronic, controlled. Latest blood pressure and vitals reviewed-     Temp:  [99.1 °F (37.3 °C)]   Pulse:  [86-92]   Resp:  [20]   BP: (121-157)/(82-94)   SpO2:  [97 %-100 %] .   Home meds for hypertension were reviewed and noted below.   Hypertension Medications               amLODIPine (NORVASC) 10 MG tablet Take 1 tablet (10 mg total) by mouth once daily.    carvediloL (COREG) 25 MG tablet Take 1 tablet (25 mg total) by mouth 2 (two) times daily.            While in the hospital, will manage blood pressure as follows; Continue home antihypertensive regimen    Will utilize p.r.n. blood pressure medication only if patient's blood pressure greater than 180/110 and she develops symptoms such as worsening chest pain or shortness of breath.      VTE Risk Mitigation (From admission, onward)           Ordered     apixaban tablet 5 mg  2 times daily         07/28/24 1117     Reason for No Pharmacological VTE Prophylaxis  Once        Question:  Reasons:  Answer:  Already adequately anticoagulated on oral Anticoagulants    07/28/24 1117     IP VTE HIGH RISK PATIENT  Once         07/28/24 1117                    Discharge Planning   ALYSE: 8/5/2024     Code  Status: Full Code   Is the patient medically ready for discharge?:     Reason for patient still in hospital (select all that apply): Patient trending condition, Laboratory test, Treatment, and Pending disposition  Discharge Plan A: Home with family   Discharge Delays: None known at this time              Lenin Steward MD  Department of Hospital Medicine   [facility] ([place]-)

## 2024-08-02 NOTE — ASSESSMENT & PLAN NOTE
Hb-SS disease with vaso-occlusive pain  Patient recently moved from [place] to [place] in June 2024. Patient reported receiving RBC exchanges every 6 week via two ports in her chest for management of her sickle cell disease. Has yet to establish care in [place].     - Hgb 11.6 on admit, at baseline  - Reticulocyte count 0.7  - Continue MS contin TID  and tizanidine PRN  - Gabapentin, APAP,  - Oxycodone 15mg PRN  - Dilaudid PRN; wean as able  - Continue IVF to prevent worsening sickling  - Adjust regimen as clinically indicated   - Given continued pain around port, soft tissue US of neck, chest, and left arm obtained which was unremarkable. Likely pain 2/2 sickle cell pain crisis. If worsening pain may need further evaluation with CT

## 2024-08-03 PROBLEM — M79.89 LEFT ARM SWELLING: Status: ACTIVE | Noted: 2024-08-03

## 2024-08-03 LAB
ALBUMIN SERPL BCP-MCNC: 3.3 G/DL (ref 3.5–5.2)
ALP SERPL-CCNC: 74 U/L (ref 55–135)
ALT SERPL W/O P-5'-P-CCNC: 22 U/L (ref 10–44)
ANION GAP SERPL CALC-SCNC: 8 MMOL/L (ref 8–16)
AST SERPL-CCNC: 22 U/L (ref 10–40)
BASOPHILS # BLD AUTO: 0.04 K/UL (ref 0–0.2)
BASOPHILS NFR BLD: 0.5 % (ref 0–1.9)
BILIRUB SERPL-MCNC: 0.5 MG/DL (ref 0.1–1)
BUN SERPL-MCNC: 23 MG/DL (ref 6–20)
CALCIUM SERPL-MCNC: 9.4 MG/DL (ref 8.7–10.5)
CHLORIDE SERPL-SCNC: 108 MMOL/L (ref 95–110)
CO2 SERPL-SCNC: 25 MMOL/L (ref 23–29)
CREAT SERPL-MCNC: 1.6 MG/DL (ref 0.5–1.4)
DIFFERENTIAL METHOD BLD: ABNORMAL
EOSINOPHIL # BLD AUTO: 0.4 K/UL (ref 0–0.5)
EOSINOPHIL NFR BLD: 4.8 % (ref 0–8)
ERYTHROCYTE [DISTWIDTH] IN BLOOD BY AUTOMATED COUNT: 18.4 % (ref 11.5–14.5)
EST. GFR  (NO RACE VARIABLE): 40 ML/MIN/1.73 M^2
GLUCOSE SERPL-MCNC: 89 MG/DL (ref 70–110)
HCT VFR BLD AUTO: 27 % (ref 37–48.5)
HGB BLD-MCNC: 9.2 G/DL (ref 12–16)
IMM GRANULOCYTES # BLD AUTO: 0.02 K/UL (ref 0–0.04)
IMM GRANULOCYTES NFR BLD AUTO: 0.3 % (ref 0–0.5)
LYMPHOCYTES # BLD AUTO: 2.8 K/UL (ref 1–4.8)
LYMPHOCYTES NFR BLD: 38.4 % (ref 18–48)
MAGNESIUM SERPL-MCNC: 1.9 MG/DL (ref 1.6–2.6)
MCH RBC QN AUTO: 24.3 PG (ref 27–31)
MCHC RBC AUTO-ENTMCNC: 34.1 G/DL (ref 32–36)
MCV RBC AUTO: 71 FL (ref 82–98)
MONOCYTES # BLD AUTO: 0.8 K/UL (ref 0.3–1)
MONOCYTES NFR BLD: 11.5 % (ref 4–15)
NEUTROPHILS # BLD AUTO: 3.3 K/UL (ref 1.8–7.7)
NEUTROPHILS NFR BLD: 44.5 % (ref 38–73)
NRBC BLD-RTO: 2 /100 WBC
PHOSPHATE SERPL-MCNC: 3.5 MG/DL (ref 2.7–4.5)
PLATELET # BLD AUTO: 260 K/UL (ref 150–450)
PMV BLD AUTO: 10.2 FL (ref 9.2–12.9)
POTASSIUM SERPL-SCNC: 3.8 MMOL/L (ref 3.5–5.1)
PROT SERPL-MCNC: 6.8 G/DL (ref 6–8.4)
RBC # BLD AUTO: 3.78 M/UL (ref 4–5.4)
SODIUM SERPL-SCNC: 141 MMOL/L (ref 136–145)
WBC # BLD AUTO: 7.31 K/UL (ref 3.9–12.7)

## 2024-08-03 PROCEDURE — 63600175 PHARM REV CODE 636 W HCPCS: Performed by: STUDENT IN AN ORGANIZED HEALTH CARE EDUCATION/TRAINING PROGRAM

## 2024-08-03 PROCEDURE — 25000003 PHARM REV CODE 250: Performed by: STUDENT IN AN ORGANIZED HEALTH CARE EDUCATION/TRAINING PROGRAM

## 2024-08-03 PROCEDURE — S5010 5% DEXTROSE AND 0.45% SALINE: HCPCS | Performed by: STUDENT IN AN ORGANIZED HEALTH CARE EDUCATION/TRAINING PROGRAM

## 2024-08-03 PROCEDURE — 80053 COMPREHEN METABOLIC PANEL: CPT | Performed by: STUDENT IN AN ORGANIZED HEALTH CARE EDUCATION/TRAINING PROGRAM

## 2024-08-03 PROCEDURE — 25000003 PHARM REV CODE 250

## 2024-08-03 PROCEDURE — 11000001 HC ACUTE MED/SURG PRIVATE ROOM

## 2024-08-03 PROCEDURE — 84100 ASSAY OF PHOSPHORUS: CPT | Performed by: STUDENT IN AN ORGANIZED HEALTH CARE EDUCATION/TRAINING PROGRAM

## 2024-08-03 PROCEDURE — 36415 COLL VENOUS BLD VENIPUNCTURE: CPT | Performed by: STUDENT IN AN ORGANIZED HEALTH CARE EDUCATION/TRAINING PROGRAM

## 2024-08-03 PROCEDURE — 85025 COMPLETE CBC W/AUTO DIFF WBC: CPT | Performed by: STUDENT IN AN ORGANIZED HEALTH CARE EDUCATION/TRAINING PROGRAM

## 2024-08-03 PROCEDURE — 83735 ASSAY OF MAGNESIUM: CPT | Performed by: STUDENT IN AN ORGANIZED HEALTH CARE EDUCATION/TRAINING PROGRAM

## 2024-08-03 RX ORDER — DEXTROSE MONOHYDRATE AND SODIUM CHLORIDE 5; .45 G/100ML; G/100ML
INJECTION, SOLUTION INTRAVENOUS CONTINUOUS
Status: DISCONTINUED | OUTPATIENT
Start: 2024-08-03 | End: 2024-08-04

## 2024-08-03 RX ORDER — PROCHLORPERAZINE EDISYLATE 5 MG/ML
2.5 INJECTION INTRAMUSCULAR; INTRAVENOUS EVERY 6 HOURS PRN
Status: DISCONTINUED | OUTPATIENT
Start: 2024-08-03 | End: 2024-08-16 | Stop reason: HOSPADM

## 2024-08-03 RX ADMIN — APIXABAN 5 MG: 5 TABLET, FILM COATED ORAL at 08:08

## 2024-08-03 RX ADMIN — MORPHINE SULFATE 15 MG: 15 TABLET, EXTENDED RELEASE ORAL at 06:08

## 2024-08-03 RX ADMIN — PROMETHAZINE HYDROCHLORIDE 25 MG: 12.5 TABLET ORAL at 02:08

## 2024-08-03 RX ADMIN — TIZANIDINE 4 MG: 2 TABLET ORAL at 08:08

## 2024-08-03 RX ADMIN — DEXTROSE AND SODIUM CHLORIDE: 5; 450 INJECTION, SOLUTION INTRAVENOUS at 10:08

## 2024-08-03 RX ADMIN — CARVEDILOL 25 MG: 25 TABLET, FILM COATED ORAL at 09:08

## 2024-08-03 RX ADMIN — MORPHINE SULFATE 15 MG: 15 TABLET, EXTENDED RELEASE ORAL at 02:08

## 2024-08-03 RX ADMIN — FOLIC ACID 1 MG: 1 TABLET ORAL at 08:08

## 2024-08-03 RX ADMIN — DIPHENHYDRAMINE HYDROCHLORIDE 50 MG: 50 INJECTION, SOLUTION INTRAMUSCULAR; INTRAVENOUS at 03:08

## 2024-08-03 RX ADMIN — ACETAMINOPHEN 1000 MG: 325 TABLET ORAL at 09:08

## 2024-08-03 RX ADMIN — ACETAMINOPHEN 1000 MG: 325 TABLET ORAL at 02:08

## 2024-08-03 RX ADMIN — DIPHENHYDRAMINE HYDROCHLORIDE 50 MG: 50 INJECTION, SOLUTION INTRAMUSCULAR; INTRAVENOUS at 11:08

## 2024-08-03 RX ADMIN — PROMETHAZINE HYDROCHLORIDE 25 MG: 12.5 TABLET ORAL at 09:08

## 2024-08-03 RX ADMIN — FLUOXETINE HYDROCHLORIDE 40 MG: 20 CAPSULE ORAL at 08:08

## 2024-08-03 RX ADMIN — HYDROMORPHONE HYDROCHLORIDE 4 MG: 2 INJECTION INTRAMUSCULAR; INTRAVENOUS; SUBCUTANEOUS at 05:08

## 2024-08-03 RX ADMIN — APIXABAN 5 MG: 5 TABLET, FILM COATED ORAL at 09:08

## 2024-08-03 RX ADMIN — SENNOSIDES 8.6 MG: 8.6 TABLET, FILM COATED ORAL at 08:08

## 2024-08-03 RX ADMIN — CARVEDILOL 25 MG: 25 TABLET, FILM COATED ORAL at 08:08

## 2024-08-03 RX ADMIN — HYDROMORPHONE HYDROCHLORIDE 2 MG: 2 INJECTION INTRAMUSCULAR; INTRAVENOUS; SUBCUTANEOUS at 03:08

## 2024-08-03 RX ADMIN — HYDROMORPHONE HYDROCHLORIDE 2 MG: 2 INJECTION INTRAMUSCULAR; INTRAVENOUS; SUBCUTANEOUS at 11:08

## 2024-08-03 RX ADMIN — TIZANIDINE 4 MG: 2 TABLET ORAL at 06:08

## 2024-08-03 RX ADMIN — HYDROMORPHONE HYDROCHLORIDE 4 MG: 2 INJECTION INTRAMUSCULAR; INTRAVENOUS; SUBCUTANEOUS at 08:08

## 2024-08-03 RX ADMIN — HYDROMORPHONE HYDROCHLORIDE 4 MG: 2 INJECTION INTRAMUSCULAR; INTRAVENOUS; SUBCUTANEOUS at 09:08

## 2024-08-03 RX ADMIN — ACETAMINOPHEN 1000 MG: 325 TABLET ORAL at 08:08

## 2024-08-03 RX ADMIN — AMLODIPINE BESYLATE 10 MG: 10 TABLET ORAL at 08:08

## 2024-08-03 RX ADMIN — DIPHENHYDRAMINE HYDROCHLORIDE 50 MG: 50 INJECTION, SOLUTION INTRAMUSCULAR; INTRAVENOUS at 05:08

## 2024-08-03 RX ADMIN — PROCHLORPERAZINE EDISYLATE 2.5 MG: 5 INJECTION INTRAMUSCULAR; INTRAVENOUS at 06:08

## 2024-08-03 RX ADMIN — MORPHINE SULFATE 15 MG: 15 TABLET, EXTENDED RELEASE ORAL at 09:08

## 2024-08-03 RX ADMIN — GABAPENTIN 600 MG: 300 CAPSULE ORAL at 09:08

## 2024-08-03 RX ADMIN — HYDROMORPHONE HYDROCHLORIDE 4 MG: 2 INJECTION INTRAMUSCULAR; INTRAVENOUS; SUBCUTANEOUS at 01:08

## 2024-08-03 RX ADMIN — HYDROMORPHONE HYDROCHLORIDE 2 MG: 2 INJECTION INTRAMUSCULAR; INTRAVENOUS; SUBCUTANEOUS at 07:08

## 2024-08-03 RX ADMIN — HYDROMORPHONE HYDROCHLORIDE 4 MG: 2 INJECTION INTRAMUSCULAR; INTRAVENOUS; SUBCUTANEOUS at 02:08

## 2024-08-03 NOTE — SUBJECTIVE & OBJECTIVE
Interval History:   Poor sleep overnight and slept most of the day today. Notes continued sickle cell pain. Reports new left arm swelling. US ordered.       Review of Systems   Constitutional:  Negative for activity change, appetite change, chills, diaphoresis, fatigue and fever.   HENT:  Negative for rhinorrhea and sore throat.    Respiratory:  Negative for cough, chest tightness and shortness of breath.    Cardiovascular:  Negative for chest pain and palpitations.   Gastrointestinal:  Negative for abdominal pain, constipation, diarrhea and nausea.   Endocrine: Negative for cold intolerance.   Genitourinary:  Negative for decreased urine volume and dysuria.   Musculoskeletal:  Positive for arthralgias (left chest/arm) and myalgias.   Skin:  Negative for rash and wound.   Neurological:  Positive for headaches. Negative for dizziness, weakness and numbness.   Psychiatric/Behavioral:  Negative for agitation, behavioral problems and confusion.      Objective:     Vital Signs (Most Recent):  Temp: 98.7 °F (37.1 °C) (08/03/24 1224)  Pulse: 69 (08/03/24 1224)  Resp: 20 (08/03/24 1511)  BP: 110/79 (08/03/24 1224)  SpO2: (!) 89 % (08/03/24 1500) Vital Signs (24h Range):  Temp:  [97.5 °F (36.4 °C)-99.3 °F (37.4 °C)] 98.7 °F (37.1 °C)  Pulse:  [60-88] 69  Resp:  [18-20] 20  SpO2:  [89 %-99 %] 89 %  BP: (110-115)/(72-79) 110/79     Weight: 93.7 kg (206 lb 9.1 oz)  Body mass index is 37.78 kg/m².    Intake/Output Summary (Last 24 hours) at 8/3/2024 1512  Last data filed at 8/3/2024 1429  Gross per 24 hour   Intake 250 ml   Output 1100 ml   Net -850 ml         Physical Exam  Constitutional:       General: She is not in acute distress.     Appearance: Normal appearance. She is obese.   HENT:      Head: Normocephalic and atraumatic.      Mouth/Throat:      Mouth: Mucous membranes are moist.   Eyes:      Extraocular Movements: Extraocular movements intact.      Conjunctiva/sclera: Conjunctivae normal.   Cardiovascular:      Rate  and Rhythm: Normal rate and regular rhythm.   Pulmonary:      Effort: Pulmonary effort is normal. No respiratory distress.      Breath sounds: Normal breath sounds. No wheezing or rales.   Abdominal:      General: Abdomen is flat. Bowel sounds are normal.      Palpations: Abdomen is soft.      Tenderness: There is no abdominal tenderness. There is no guarding.   Musculoskeletal:         General: Swelling (left arm) present. No tenderness.   Skin:     Findings: No rash.      Comments: Bilateral chest port sites without erythema     Neurological:      General: No focal deficit present.      Mental Status: She is alert and oriented to person, place, and time. Mental status is at baseline.   Psychiatric:         Mood and Affect: Mood normal.         Behavior: Behavior normal.             Significant Labs: All pertinent labs within the past 24 hours have been reviewed.  CBC:   Recent Labs   Lab 08/02/24  0541 08/03/24  0420   WBC 6.59 7.31   HGB 8.9* 9.2*   HCT 27.5* 27.0*    260     CMP:   Recent Labs   Lab 08/02/24  0541 08/03/24  0420    141   K 4.1 3.8    108   CO2 23 25   GLU 79 89   BUN 23* 23*   CREATININE 1.4 1.6*   CALCIUM 9.6 9.4   PROT 6.8 6.8   ALBUMIN 3.3* 3.3*   BILITOT 0.5 0.5   ALKPHOS 72 74   AST 22 22   ALT 22 22   ANIONGAP 7* 8       Significant Imaging: I have reviewed all pertinent imaging results/findings within the past 24 hours.

## 2024-08-03 NOTE — PROGRESS NOTES
[facility] - [facility] ([place] [address])  [facility] Medicine  Progress Note    Patient Name: Nazanin Malone  MRN: [id number]  Patient Class: IP- Inpatient   Admission Date: 7/28/2024  Length of Stay: 6 days  Attending Physician: Lenin Steward MD  Primary Care Provider: Pee Montgomery MD        Subjective:     Principal Problem:Sickle cell pain crisis        HPI:  Nazanin Malone is a 44 year old female with history of SC anemia with history of acute chest, beta thalassemia, HTN, obesity, iron deficiency anemia, hx of PE on apixaban, RLE fasciotomy, and recent hospitalization for bacteremia s/p antibiotic course and recent sickle cell pain crisis that presents with sickle cell pain crisis. Patient reports recurrent pain consistent with her prior sickle cell pain crisis. Her pain started 2-3 days ago which started mostly in her left chest. Pain was constant without relief despite taking her home opiate regimen. Yesterday, she noted pain in her left arm and some shortness of breath. She reports a cough with yellow sputum and felt like she was oscillating between feeling hot and cold as well as low appetite associated with nausea. Denies F/C, abdominal pain, constipation.    Of note, had recent hospitalization for staph epidermis bacteremia s/p 14 day course of IV vancomycin.    Upon arrival to the ED, patient afebrile, HDS, and saturating well on room air. Labs notable for WBC 9, Hgb 11.5 (higher than baseline), K 3.2, trop negative, BNP WNL, blood cultures x2 sent. EKG without ischemic changes. CXR without consolidation.     Overview/Hospital Course:  Patient admitted for sickle cell pain crisis. Infectious work up negative. Started on IV pain medication PRN and IVF. US of left cath/chest obtained due to superficial pain around port. US without fluid collection or mass and pain believed to be secondary to sickle cell pain crisis.     Interval History:   Poor sleep overnight and slept most of the day today.  Notes continued sickle cell pain. Reports new left arm swelling. US ordered.       Review of Systems   Constitutional:  Negative for activity change, appetite change, chills, diaphoresis, fatigue and fever.   HENT:  Negative for rhinorrhea and sore throat.    Respiratory:  Negative for cough, chest tightness and shortness of breath.    Cardiovascular:  Negative for chest pain and palpitations.   Gastrointestinal:  Negative for abdominal pain, constipation, diarrhea and nausea.   Endocrine: Negative for cold intolerance.   Genitourinary:  Negative for decreased urine volume and dysuria.   Musculoskeletal:  Positive for arthralgias (left chest/arm) and myalgias.   Skin:  Negative for rash and wound.   Neurological:  Positive for headaches. Negative for dizziness, weakness and numbness.   Psychiatric/Behavioral:  Negative for agitation, behavioral problems and confusion.      Objective:     Vital Signs (Most Recent):  Temp: 98.7 °F (37.1 °C) (08/03/24 1224)  Pulse: 69 (08/03/24 1224)  Resp: 20 (08/03/24 1511)  BP: 110/79 (08/03/24 1224)  SpO2: (!) 89 % (08/03/24 1500) Vital Signs (24h Range):  Temp:  [97.5 °F (36.4 °C)-99.3 °F (37.4 °C)] 98.7 °F (37.1 °C)  Pulse:  [60-88] 69  Resp:  [18-20] 20  SpO2:  [89 %-99 %] 89 %  BP: (110-115)/(72-79) 110/79     Weight: 93.7 kg (206 lb 9.1 oz)  Body mass index is 37.78 kg/m².    Intake/Output Summary (Last 24 hours) at 8/3/2024 1512  Last data filed at 8/3/2024 1429  Gross per 24 hour   Intake 250 ml   Output 1100 ml   Net -850 ml         Physical Exam  Constitutional:       General: She is not in acute distress.     Appearance: Normal appearance. She is obese.   HENT:      Head: Normocephalic and atraumatic.      Mouth/Throat:      Mouth: Mucous membranes are moist.   Eyes:      Extraocular Movements: Extraocular movements intact.      Conjunctiva/sclera: Conjunctivae normal.   Cardiovascular:      Rate and Rhythm: Normal rate and regular rhythm.   Pulmonary:      Effort:  Pulmonary effort is normal. No respiratory distress.      Breath sounds: Normal breath sounds. No wheezing or rales.   Abdominal:      General: Abdomen is flat. Bowel sounds are normal.      Palpations: Abdomen is soft.      Tenderness: There is no abdominal tenderness. There is no guarding.   Musculoskeletal:         General: Swelling (left arm) present. No tenderness.   Skin:     Findings: No rash.      Comments: Bilateral chest port sites without erythema     Neurological:      General: No focal deficit present.      Mental Status: She is alert and oriented to person, place, and time. Mental status is at baseline.   Psychiatric:         Mood and Affect: Mood normal.         Behavior: Behavior normal.             Significant Labs: All pertinent labs within the past 24 hours have been reviewed.  CBC:   Recent Labs   Lab 08/02/24  0541 08/03/24  0420   WBC 6.59 7.31   HGB 8.9* 9.2*   HCT 27.5* 27.0*    260     CMP:   Recent Labs   Lab 08/02/24  0541 08/03/24  0420    141   K 4.1 3.8    108   CO2 23 25   GLU 79 89   BUN 23* 23*   CREATININE 1.4 1.6*   CALCIUM 9.6 9.4   PROT 6.8 6.8   ALBUMIN 3.3* 3.3*   BILITOT 0.5 0.5   ALKPHOS 72 74   AST 22 22   ALT 22 22   ANIONGAP 7* 8       Significant Imaging: I have reviewed all pertinent imaging results/findings within the past 24 hours.    Assessment/Plan:      * Sickle cell pain crisis  Hb-SS disease with vaso-occlusive pain  Patient recently moved from [place] to [place] in June 2024. Patient reported receiving RBC exchanges every 6 week via two ports in her chest for management of her sickle cell disease. Has yet to establish care in [place].     - Hgb 11.6 on admit, at baseline  - Reticulocyte count 0.7  - Continue MS contin TID  and tizanidine PRN  - Gabapentin, APAP,  - Oxycodone 15mg PRN  - Dilaudid PRN; wean as able  - Continue IVF to prevent worsening sickling  - Adjust regimen as clinically indicated   - Given continued pain around port,  soft tissue US of neck, chest, and left arm obtained which was unremarkable. Likely pain 2/2 sickle cell pain crisis. If worsening pain may need further evaluation with CT    FLOR (acute kidney injury)  - Cr elevated to 1.5, baseline 1.0  - Likely 2/2 poor PO intake in setting of sickle cell crisis  - Improved with IVF  - Renally dosing all medications  - Avoiding nephrotoxins  - Will continue to monitor on daily labs  - Improved    Left arm swelling  - US ordered      Hypokalemia  Patient's most recent potassium results are listed below.   Recent Labs     07/28/24  0837   K 3.2*     Plan  - Replete potassium per protocol  - Monitor potassium Daily  - Patient's hypokalemia is stable    History of pulmonary embolism  - hx noted  - continue home Eliquis     Hypertension  Chronic, controlled. Latest blood pressure and vitals reviewed-     Temp:  [99.1 °F (37.3 °C)]   Pulse:  [86-92]   Resp:  [20]   BP: (121-157)/(82-94)   SpO2:  [97 %-100 %] .   Home meds for hypertension were reviewed and noted below.   Hypertension Medications               amLODIPine (NORVASC) 10 MG tablet Take 1 tablet (10 mg total) by mouth once daily.    carvediloL (COREG) 25 MG tablet Take 1 tablet (25 mg total) by mouth 2 (two) times daily.            While in the hospital, will manage blood pressure as follows; Continue home antihypertensive regimen    Will utilize p.r.n. blood pressure medication only if patient's blood pressure greater than 180/110 and she develops symptoms such as worsening chest pain or shortness of breath.      VTE Risk Mitigation (From admission, onward)           Ordered     apixaban tablet 5 mg  2 times daily         07/28/24 1117     Reason for No Pharmacological VTE Prophylaxis  Once        Question:  Reasons:  Answer:  Already adequately anticoagulated on oral Anticoagulants    07/28/24 1117     IP VTE HIGH RISK PATIENT  Once         07/28/24 1117                    Discharge Planning   ALYSE: 8/5/2024     Code Status:  Full Code   Is the patient medically ready for discharge?:     Reason for patient still in hospital (select all that apply): Patient trending condition, Laboratory test, Treatment, Imaging, and Pending disposition  Discharge Plan A: Home with family   Discharge Delays: None known at this time              Lenin Steward MD  Department of Hospital Medicine   [facility] Surg ([place]-)

## 2024-08-04 PROBLEM — I65.22: Status: ACTIVE | Noted: 2024-08-03

## 2024-08-04 LAB
ALBUMIN SERPL BCP-MCNC: 3 G/DL (ref 3.5–5.2)
ALP SERPL-CCNC: 72 U/L (ref 55–135)
ALT SERPL W/O P-5'-P-CCNC: 15 U/L (ref 10–44)
ANION GAP SERPL CALC-SCNC: 5 MMOL/L (ref 8–16)
APTT PPP: 28.2 SEC (ref 21–32)
AST SERPL-CCNC: 16 U/L (ref 10–40)
BASOPHILS # BLD AUTO: 0.04 K/UL (ref 0–0.2)
BASOPHILS NFR BLD: 0.6 % (ref 0–1.9)
BILIRUB SERPL-MCNC: 0.4 MG/DL (ref 0.1–1)
BUN SERPL-MCNC: 22 MG/DL (ref 6–20)
CALCIUM SERPL-MCNC: 9 MG/DL (ref 8.7–10.5)
CHLORIDE SERPL-SCNC: 107 MMOL/L (ref 95–110)
CO2 SERPL-SCNC: 28 MMOL/L (ref 23–29)
CREAT SERPL-MCNC: 1.4 MG/DL (ref 0.5–1.4)
DIFFERENTIAL METHOD BLD: ABNORMAL
EOSINOPHIL # BLD AUTO: 0.4 K/UL (ref 0–0.5)
EOSINOPHIL NFR BLD: 5.9 % (ref 0–8)
ERYTHROCYTE [DISTWIDTH] IN BLOOD BY AUTOMATED COUNT: 18 % (ref 11.5–14.5)
EST. GFR  (NO RACE VARIABLE): 47 ML/MIN/1.73 M^2
GLUCOSE SERPL-MCNC: 132 MG/DL (ref 70–110)
HCT VFR BLD AUTO: 25.9 % (ref 37–48.5)
HGB BLD-MCNC: 8.5 G/DL (ref 12–16)
IMM GRANULOCYTES # BLD AUTO: 0.01 K/UL (ref 0–0.04)
IMM GRANULOCYTES NFR BLD AUTO: 0.2 % (ref 0–0.5)
LYMPHOCYTES # BLD AUTO: 2.9 K/UL (ref 1–4.8)
LYMPHOCYTES NFR BLD: 44.8 % (ref 18–48)
MAGNESIUM SERPL-MCNC: 1.9 MG/DL (ref 1.6–2.6)
MCH RBC QN AUTO: 23.4 PG (ref 27–31)
MCHC RBC AUTO-ENTMCNC: 32.8 G/DL (ref 32–36)
MCV RBC AUTO: 71 FL (ref 82–98)
MONOCYTES # BLD AUTO: 0.9 K/UL (ref 0.3–1)
MONOCYTES NFR BLD: 14.2 % (ref 4–15)
NEUTROPHILS # BLD AUTO: 2.2 K/UL (ref 1.8–7.7)
NEUTROPHILS NFR BLD: 34.3 % (ref 38–73)
NRBC BLD-RTO: 2 /100 WBC
PHOSPHATE SERPL-MCNC: 4.1 MG/DL (ref 2.7–4.5)
PLATELET # BLD AUTO: 268 K/UL (ref 150–450)
PMV BLD AUTO: 11 FL (ref 9.2–12.9)
POTASSIUM SERPL-SCNC: 3.9 MMOL/L (ref 3.5–5.1)
PROT SERPL-MCNC: 6.3 G/DL (ref 6–8.4)
RBC # BLD AUTO: 3.64 M/UL (ref 4–5.4)
SODIUM SERPL-SCNC: 140 MMOL/L (ref 136–145)
WBC # BLD AUTO: 6.39 K/UL (ref 3.9–12.7)

## 2024-08-04 PROCEDURE — 85730 THROMBOPLASTIN TIME PARTIAL: CPT | Performed by: STUDENT IN AN ORGANIZED HEALTH CARE EDUCATION/TRAINING PROGRAM

## 2024-08-04 PROCEDURE — 63600175 PHARM REV CODE 636 W HCPCS: Performed by: STUDENT IN AN ORGANIZED HEALTH CARE EDUCATION/TRAINING PROGRAM

## 2024-08-04 PROCEDURE — 11000001 HC ACUTE MED/SURG PRIVATE ROOM

## 2024-08-04 PROCEDURE — 99223 1ST HOSP IP/OBS HIGH 75: CPT | Mod: ,,, | Performed by: HOSPITALIST

## 2024-08-04 PROCEDURE — 83735 ASSAY OF MAGNESIUM: CPT | Performed by: STUDENT IN AN ORGANIZED HEALTH CARE EDUCATION/TRAINING PROGRAM

## 2024-08-04 PROCEDURE — 84100 ASSAY OF PHOSPHORUS: CPT | Performed by: STUDENT IN AN ORGANIZED HEALTH CARE EDUCATION/TRAINING PROGRAM

## 2024-08-04 PROCEDURE — S5010 5% DEXTROSE AND 0.45% SALINE: HCPCS | Performed by: STUDENT IN AN ORGANIZED HEALTH CARE EDUCATION/TRAINING PROGRAM

## 2024-08-04 PROCEDURE — 85025 COMPLETE CBC W/AUTO DIFF WBC: CPT | Performed by: STUDENT IN AN ORGANIZED HEALTH CARE EDUCATION/TRAINING PROGRAM

## 2024-08-04 PROCEDURE — 80053 COMPREHEN METABOLIC PANEL: CPT | Performed by: STUDENT IN AN ORGANIZED HEALTH CARE EDUCATION/TRAINING PROGRAM

## 2024-08-04 PROCEDURE — 25000003 PHARM REV CODE 250

## 2024-08-04 PROCEDURE — 25000003 PHARM REV CODE 250: Performed by: STUDENT IN AN ORGANIZED HEALTH CARE EDUCATION/TRAINING PROGRAM

## 2024-08-04 PROCEDURE — 36415 COLL VENOUS BLD VENIPUNCTURE: CPT | Performed by: STUDENT IN AN ORGANIZED HEALTH CARE EDUCATION/TRAINING PROGRAM

## 2024-08-04 RX ORDER — DIPHENHYDRAMINE HYDROCHLORIDE 50 MG/ML
50 INJECTION INTRAMUSCULAR; INTRAVENOUS EVERY 4 HOURS PRN
Status: DISCONTINUED | OUTPATIENT
Start: 2024-08-04 | End: 2024-08-09

## 2024-08-04 RX ORDER — HEPARIN SODIUM,PORCINE/D5W 25000/250
0-40 INTRAVENOUS SOLUTION INTRAVENOUS CONTINUOUS
Status: DISCONTINUED | OUTPATIENT
Start: 2024-08-04 | End: 2024-08-09

## 2024-08-04 RX ORDER — DEXTROSE MONOHYDRATE AND SODIUM CHLORIDE 5; .45 G/100ML; G/100ML
INJECTION, SOLUTION INTRAVENOUS CONTINUOUS
Status: DISCONTINUED | OUTPATIENT
Start: 2024-08-04 | End: 2024-08-04

## 2024-08-04 RX ADMIN — CARVEDILOL 25 MG: 25 TABLET, FILM COATED ORAL at 10:08

## 2024-08-04 RX ADMIN — HYDROMORPHONE HYDROCHLORIDE 4 MG: 2 INJECTION INTRAMUSCULAR; INTRAVENOUS; SUBCUTANEOUS at 11:08

## 2024-08-04 RX ADMIN — DIPHENHYDRAMINE HYDROCHLORIDE 50 MG: 50 INJECTION, SOLUTION INTRAMUSCULAR; INTRAVENOUS at 04:08

## 2024-08-04 RX ADMIN — HYDROMORPHONE HYDROCHLORIDE 2 MG: 2 INJECTION INTRAMUSCULAR; INTRAVENOUS; SUBCUTANEOUS at 03:08

## 2024-08-04 RX ADMIN — HYDROMORPHONE HYDROCHLORIDE 2 MG: 2 INJECTION INTRAMUSCULAR; INTRAVENOUS; SUBCUTANEOUS at 10:08

## 2024-08-04 RX ADMIN — FOLIC ACID 1 MG: 1 TABLET ORAL at 10:08

## 2024-08-04 RX ADMIN — HYDROMORPHONE HYDROCHLORIDE 4 MG: 2 INJECTION INTRAMUSCULAR; INTRAVENOUS; SUBCUTANEOUS at 01:08

## 2024-08-04 RX ADMIN — HYDROMORPHONE HYDROCHLORIDE 4 MG: 2 INJECTION INTRAMUSCULAR; INTRAVENOUS; SUBCUTANEOUS at 04:08

## 2024-08-04 RX ADMIN — DIPHENHYDRAMINE HYDROCHLORIDE 50 MG: 50 INJECTION, SOLUTION INTRAMUSCULAR; INTRAVENOUS at 12:08

## 2024-08-04 RX ADMIN — HYDROMORPHONE HYDROCHLORIDE 4 MG: 2 INJECTION INTRAMUSCULAR; INTRAVENOUS; SUBCUTANEOUS at 08:08

## 2024-08-04 RX ADMIN — ACETAMINOPHEN 1000 MG: 325 TABLET ORAL at 08:08

## 2024-08-04 RX ADMIN — DEXTROSE AND SODIUM CHLORIDE: 5; 450 INJECTION, SOLUTION INTRAVENOUS at 06:08

## 2024-08-04 RX ADMIN — TIZANIDINE 4 MG: 2 TABLET ORAL at 02:08

## 2024-08-04 RX ADMIN — PROCHLORPERAZINE EDISYLATE 2.5 MG: 5 INJECTION INTRAMUSCULAR; INTRAVENOUS at 01:08

## 2024-08-04 RX ADMIN — HYDROMORPHONE HYDROCHLORIDE 2 MG: 2 INJECTION INTRAMUSCULAR; INTRAVENOUS; SUBCUTANEOUS at 04:08

## 2024-08-04 RX ADMIN — PROCHLORPERAZINE EDISYLATE 2.5 MG: 5 INJECTION INTRAMUSCULAR; INTRAVENOUS at 07:08

## 2024-08-04 RX ADMIN — PROMETHAZINE HYDROCHLORIDE 25 MG: 12.5 TABLET ORAL at 08:08

## 2024-08-04 RX ADMIN — SENNOSIDES 8.6 MG: 8.6 TABLET, FILM COATED ORAL at 10:08

## 2024-08-04 RX ADMIN — DIPHENHYDRAMINE HYDROCHLORIDE 50 MG: 50 INJECTION, SOLUTION INTRAMUSCULAR; INTRAVENOUS at 06:08

## 2024-08-04 RX ADMIN — FLUOXETINE HYDROCHLORIDE 40 MG: 20 CAPSULE ORAL at 10:08

## 2024-08-04 RX ADMIN — APIXABAN 5 MG: 5 TABLET, FILM COATED ORAL at 10:08

## 2024-08-04 RX ADMIN — MORPHINE SULFATE 15 MG: 15 TABLET, EXTENDED RELEASE ORAL at 08:08

## 2024-08-04 RX ADMIN — PROMETHAZINE HYDROCHLORIDE 25 MG: 12.5 TABLET ORAL at 04:08

## 2024-08-04 RX ADMIN — OXYCODONE HYDROCHLORIDE 15 MG: 10 TABLET ORAL at 11:08

## 2024-08-04 RX ADMIN — HYDROMORPHONE HYDROCHLORIDE 2 MG: 2 INJECTION INTRAMUSCULAR; INTRAVENOUS; SUBCUTANEOUS at 11:08

## 2024-08-04 RX ADMIN — HYDROMORPHONE HYDROCHLORIDE 4 MG: 2 INJECTION INTRAMUSCULAR; INTRAVENOUS; SUBCUTANEOUS at 06:08

## 2024-08-04 RX ADMIN — ACETAMINOPHEN 1000 MG: 325 TABLET ORAL at 10:08

## 2024-08-04 RX ADMIN — CARVEDILOL 25 MG: 25 TABLET, FILM COATED ORAL at 08:08

## 2024-08-04 RX ADMIN — MORPHINE SULFATE 15 MG: 15 TABLET, EXTENDED RELEASE ORAL at 06:08

## 2024-08-04 RX ADMIN — HYDROMORPHONE HYDROCHLORIDE 2 MG: 2 INJECTION INTRAMUSCULAR; INTRAVENOUS; SUBCUTANEOUS at 07:08

## 2024-08-04 RX ADMIN — AMLODIPINE BESYLATE 10 MG: 10 TABLET ORAL at 10:08

## 2024-08-04 RX ADMIN — GABAPENTIN 600 MG: 300 CAPSULE ORAL at 08:08

## 2024-08-04 RX ADMIN — HEPARIN SODIUM AND DEXTROSE 18 UNITS/KG/HR: 10000; 5 INJECTION INTRAVENOUS at 04:08

## 2024-08-04 RX ADMIN — DIPHENHYDRAMINE HYDROCHLORIDE 50 MG: 50 INJECTION, SOLUTION INTRAMUSCULAR; INTRAVENOUS at 08:08

## 2024-08-04 NOTE — ASSESSMENT & PLAN NOTE
Hb-SS disease with vaso-occlusive pain  Patient recently moved from [place] to [place] in June 2024. Patient reported receiving RBC exchanges every 6 week via two ports in her chest for management of her sickle cell disease. Has yet to establish care in [place].     - Hgb 11.6 on admit, at baseline  - Reticulocyte count 0.7  - Continue MS contin TID  and tizanidine PRN  - Gabapentin, APAP,  - Oxycodone 15mg PRN  - Dilaudid PRN; wean as able  - s/p IVF to prevent worsening sickling  - Adjust regimen as clinically indicated   - Given continued pain around port, soft tissue US of neck, chest, and left arm obtained which was unremarkable. Likely pain 2/2 sickle cell pain crisis. If worsening pain may need further evaluation with CT

## 2024-08-04 NOTE — ASSESSMENT & PLAN NOTE
- US Left upper extremity 8/3 with partial deep vein thrombosis of the left internal jugular vein by the port tip   - BLE Dupplex ordered  - Discussed DVT while on Eliquis 5mg BID, and is considered treatment failure  - Transitioned to heparin drip  - Hematology consulted

## 2024-08-04 NOTE — PROGRESS NOTES
[facility] - Med S[facility] ([place] [id number])  [facility] Medicine  Progress Note    Patient Name: Nazanin Malone  MRN: [id number]  Patient Class: IP- Inpatient   Admission Date: 7/28/2024  Length of Stay: 7 days  Attending Physician: Lenin Steward MD  Primary Care Provider: Pee Montgomery MD        Subjective:     Principal Problem:Sickle cell pain crisis        HPI:  Nazanin Malone is a 44 year old female with history of SC anemia with history of acute chest, beta thalassemia, HTN, obesity, iron deficiency anemia, hx of PE on apixaban, RLE fasciotomy, and recent hospitalization for bacteremia s/p antibiotic course and recent sickle cell pain crisis that presents with sickle cell pain crisis. Patient reports recurrent pain consistent with her prior sickle cell pain crisis. Her pain started 2-3 days ago which started mostly in her left chest. Pain was constant without relief despite taking her home opiate regimen. Yesterday, she noted pain in her left arm and some shortness of breath. She reports a cough with yellow sputum and felt like she was oscillating between feeling hot and cold as well as low appetite associated with nausea. Denies F/C, abdominal pain, constipation.    Of note, had recent hospitalization for staph epidermis bacteremia s/p 14 day course of IV vancomycin.    Upon arrival to the ED, patient afebrile, HDS, and saturating well on room air. Labs notable for WBC 9, Hgb 11.5 (higher than baseline), K 3.2, trop negative, BNP WNL, blood cultures x2 sent. EKG without ischemic changes. CXR without consolidation.     Overview/Hospital Course:  Patient admitted for sickle cell pain crisis. Infectious work up negative. Started on IV pain medication PRN and IVF. US of left cath/chest obtained due to superficial pain around port. US without fluid collection or mass and pain believed to be secondary to sickle cell pain crisis.     Interval History:   US with partial L IV DVT. Hematology consulted for  assistance given already on AC. Reports new BLE swelling. Denies dyspnea, chest pain, nausea, and headache.       Review of Systems   Constitutional:  Negative for activity change, appetite change, chills, diaphoresis, fatigue and fever.   HENT:  Negative for rhinorrhea and sore throat.    Respiratory:  Negative for cough, chest tightness and shortness of breath.    Cardiovascular:  Positive for leg swelling. Negative for chest pain and palpitations.   Gastrointestinal:  Negative for abdominal pain, constipation, diarrhea and nausea.   Endocrine: Negative for cold intolerance.   Genitourinary:  Negative for decreased urine volume and dysuria.   Musculoskeletal:  Positive for arthralgias (left chest/arm) and myalgias.   Skin:  Negative for rash and wound.   Neurological:  Positive for headaches. Negative for dizziness, weakness and numbness.   Psychiatric/Behavioral:  Negative for agitation, behavioral problems and confusion.      Objective:     Vital Signs (Most Recent):  Temp: 98.9 °F (37.2 °C) (08/04/24 1155)  Pulse: 75 (08/04/24 1155)  Resp: 18 (08/04/24 1128)  BP: 113/75 (08/04/24 1155)  SpO2: 96 % (08/04/24 1155) Vital Signs (24h Range):  Temp:  [98.2 °F (36.8 °C)-98.9 °F (37.2 °C)] 98.9 °F (37.2 °C)  Pulse:  [68-75] 75  Resp:  [18-20] 18  SpO2:  [89 %-99 %] 96 %  BP: ()/(54-75) 113/75     Weight: 93.7 kg (206 lb 9.1 oz)  Body mass index is 37.78 kg/m².  No intake or output data in the 24 hours ending 08/04/24 1321      Physical Exam  Constitutional:       General: She is not in acute distress.     Appearance: Normal appearance. She is obese.   HENT:      Head: Normocephalic and atraumatic.      Mouth/Throat:      Mouth: Mucous membranes are moist.   Eyes:      Extraocular Movements: Extraocular movements intact.      Conjunctiva/sclera: Conjunctivae normal.   Cardiovascular:      Rate and Rhythm: Normal rate and regular rhythm.   Pulmonary:      Effort: Pulmonary effort is normal. No respiratory  distress.      Breath sounds: Normal breath sounds. No wheezing or rales.   Abdominal:      General: Abdomen is flat. Bowel sounds are normal.      Palpations: Abdomen is soft.      Tenderness: There is no abdominal tenderness. There is no guarding.   Musculoskeletal:         General: Swelling (left arm) present. No tenderness.   Skin:     Findings: No rash.      Comments: Bilateral chest port sites without erythema     Neurological:      General: No focal deficit present.      Mental Status: She is alert and oriented to person, place, and time. Mental status is at baseline.   Psychiatric:         Mood and Affect: Mood normal.         Behavior: Behavior normal.             Significant Labs: All pertinent labs within the past 24 hours have been reviewed.  CBC:   Recent Labs   Lab 08/03/24 0420 08/04/24  0432   WBC 7.31 6.39   HGB 9.2* 8.5*   HCT 27.0* 25.9*    268     CMP:   Recent Labs   Lab 08/03/24 0420 08/04/24  0432    140   K 3.8 3.9    107   CO2 25 28   GLU 89 132*   BUN 23* 22*   CREATININE 1.6* 1.4   CALCIUM 9.4 9.0   PROT 6.8 6.3   ALBUMIN 3.3* 3.0*   BILITOT 0.5 0.4   ALKPHOS 74 72   AST 22 16   ALT 22 15   ANIONGAP 8 5*       Significant Imaging: I have reviewed all pertinent imaging results/findings within the past 24 hours.    Assessment/Plan:      * Sickle cell pain crisis  Hb-SS disease with vaso-occlusive pain  Patient recently moved from [place] to [place] in June 2024. Patient reported receiving RBC exchanges every 6 week via two ports in her chest for management of her sickle cell disease. Has yet to establish care in [place].     - Hgb 11.6 on admit, at baseline  - Reticulocyte count 0.7  - Continue MS contin TID  and tizanidine PRN  - Gabapentin, APAP,  - Oxycodone 15mg PRN  - Dilaudid PRN; wean as able  - s/p IVF to prevent worsening sickling  - Adjust regimen as clinically indicated   - Given continued pain around port, soft tissue US of neck, chest, and left arm  obtained which was unremarkable. Likely pain 2/2 sickle cell pain crisis. If worsening pain may need further evaluation with CT    FLOR (acute kidney injury)  - Cr elevated to 1.5, baseline 1.0  - Likely 2/2 poor PO intake in setting of sickle cell crisis  - Improved with IVF  - Renally dosing all medications  - Avoiding nephrotoxins  - Will continue to monitor on daily labs  - Improved    Thrombosis of internal carotid, left  - US Left upper extremity 8/3 with partial deep vein thrombosis of the left internal jugular vein by the port tip   - BLE Dupplex ordered  - Discussed DVT while on Eliquis 5mg BID, and is considered treatment failure  - Transitioned to heparin drip  - Hematology consulted      Hypokalemia  Patient's most recent potassium results are listed below.   Recent Labs     07/28/24  0837   K 3.2*     Plan  - Replete potassium per protocol  - Monitor potassium Daily  - Patient's hypokalemia is stable    History of pulmonary embolism  - hx noted  - continue home Eliquis     Hypertension  Chronic, controlled. Latest blood pressure and vitals reviewed-     Temp:  [99.1 °F (37.3 °C)]   Pulse:  [86-92]   Resp:  [20]   BP: (121-157)/(82-94)   SpO2:  [97 %-100 %] .   Home meds for hypertension were reviewed and noted below.   Hypertension Medications               amLODIPine (NORVASC) 10 MG tablet Take 1 tablet (10 mg total) by mouth once daily.    carvediloL (COREG) 25 MG tablet Take 1 tablet (25 mg total) by mouth 2 (two) times daily.            While in the hospital, will manage blood pressure as follows; Continue home antihypertensive regimen    Will utilize p.r.n. blood pressure medication only if patient's blood pressure greater than 180/110 and she develops symptoms such as worsening chest pain or shortness of breath.      VTE Risk Mitigation (From admission, onward)           Ordered     heparin 25,000 units in dextrose 5% (100 units/ml) IV bolus from bag HIGH INTENSITY nomogram - [facility]  As needed (PRN)         Question:  Heparin Infusion Adjustment (DO NOT MODIFY ANSWER)  Answer:  [email or web address]\epic\Images\Pharmacy\HeparinInfusions\heparin HIGH INTENSITY nomogram for [facility] PB210C.pdf    08/04/24 1331     heparin 25,000 units in dextrose 5% (100 units/ml) IV bolus from bag HIGH INTENSITY nomogram - [facility]  As needed (PRN)        Question:  Heparin Infusion Adjustment (DO NOT MODIFY ANSWER)  Answer:  [email or web address]\epic\Images\Pharmacy\HeparinInfusions\heparin HIGH INTENSITY nomogram for [facility] [id number].pdf    08/04/24 1331     heparin 25,000 units in dextrose 5% 250 mL (100 units/mL) infusion HIGH INTENSITY nomogram - [facility]  Continuous        Question:  Begin at (units/kg/hr)  Answer:  18    08/04/24 1331     Reason for No Pharmacological VTE Prophylaxis  Once        Question:  Reasons:  Answer:  Already adequately anticoagulated on oral Anticoagulants    07/28/24 1117     IP VTE HIGH RISK PATIENT  Once         07/28/24 1117                    Discharge Planning   ALYSE: 8/5/2024     Code Status: Full Code   Is the patient medically ready for discharge?:     Reason for patient still in hospital (select all that apply): Patient trending condition, Laboratory test, Treatment, Consult recommendations, and Pending disposition  Discharge Plan A: Home with family   Discharge Delays: None known at this time              Lenin Steward MD  Department of Hospital Medicine   [facility] ([place])

## 2024-08-04 NOTE — PLAN OF CARE
Problem: Adult Inpatient Plan of Care  Goal: Plan of Care Review  Outcome: Progressing  Goal: Patient-Specific Goal (Individualized)  Outcome: Progressing  Goal: Absence of Hospital-Acquired Illness or Injury  Outcome: Progressing  Goal: Optimal Comfort and Wellbeing  Outcome: Progressing  Goal: Readiness for Transition of Care  Outcome: Progressing     Problem: Infection  Goal: Absence of Infection Signs and Symptoms  Outcome: Progressing     Problem: Acute Kidney Injury/Impairment  Goal: Fluid and Electrolyte Balance  Outcome: Progressing  Goal: Improved Oral Intake  Outcome: Progressing  Goal: Effective Renal Function  Outcome: Progressing     Problem: Pneumonia  Goal: Fluid Balance  Outcome: Progressing  Goal: Resolution of Infection Signs and Symptoms  Outcome: Progressing  Goal: Effective Oxygenation and Ventilation  Outcome: Progressing     Problem: Pain Acute  Goal: Optimal Pain Control and Function  Outcome: Progressing     Problem: Fall Injury Risk  Goal: Absence of Fall and Fall-Related Injury  Outcome: Progressing

## 2024-08-04 NOTE — SUBJECTIVE & OBJECTIVE
Interval History:   US with partial L IV DVT. Hematology consulted for assistance given already on AC. Reports new BLE swelling. Denies dyspnea, chest pain, nausea, and headache.       Review of Systems   Constitutional:  Negative for activity change, appetite change, chills, diaphoresis, fatigue and fever.   HENT:  Negative for rhinorrhea and sore throat.    Respiratory:  Negative for cough, chest tightness and shortness of breath.    Cardiovascular:  Positive for leg swelling. Negative for chest pain and palpitations.   Gastrointestinal:  Negative for abdominal pain, constipation, diarrhea and nausea.   Endocrine: Negative for cold intolerance.   Genitourinary:  Negative for decreased urine volume and dysuria.   Musculoskeletal:  Positive for arthralgias (left chest/arm) and myalgias.   Skin:  Negative for rash and wound.   Neurological:  Positive for headaches. Negative for dizziness, weakness and numbness.   Psychiatric/Behavioral:  Negative for agitation, behavioral problems and confusion.      Objective:     Vital Signs (Most Recent):  Temp: 98.9 °F (37.2 °C) (08/04/24 1155)  Pulse: 75 (08/04/24 1155)  Resp: 18 (08/04/24 1128)  BP: 113/75 (08/04/24 1155)  SpO2: 96 % (08/04/24 1155) Vital Signs (24h Range):  Temp:  [98.2 °F (36.8 °C)-98.9 °F (37.2 °C)] 98.9 °F (37.2 °C)  Pulse:  [68-75] 75  Resp:  [18-20] 18  SpO2:  [89 %-99 %] 96 %  BP: ()/(54-75) 113/75     Weight: 93.7 kg (206 lb 9.1 oz)  Body mass index is 37.78 kg/m².  No intake or output data in the 24 hours ending 08/04/24 1321      Physical Exam  Constitutional:       General: She is not in acute distress.     Appearance: Normal appearance. She is obese.   HENT:      Head: Normocephalic and atraumatic.      Mouth/Throat:      Mouth: Mucous membranes are moist.   Eyes:      Extraocular Movements: Extraocular movements intact.      Conjunctiva/sclera: Conjunctivae normal.   Cardiovascular:      Rate and Rhythm: Normal rate and regular rhythm.    Pulmonary:      Effort: Pulmonary effort is normal. No respiratory distress.      Breath sounds: Normal breath sounds. No wheezing or rales.   Abdominal:      General: Abdomen is flat. Bowel sounds are normal.      Palpations: Abdomen is soft.      Tenderness: There is no abdominal tenderness. There is no guarding.   Musculoskeletal:         General: Swelling (left arm) present. No tenderness.   Skin:     Findings: No rash.      Comments: Bilateral chest port sites without erythema     Neurological:      General: No focal deficit present.      Mental Status: She is alert and oriented to person, place, and time. Mental status is at baseline.   Psychiatric:         Mood and Affect: Mood normal.         Behavior: Behavior normal.             Significant Labs: All pertinent labs within the past 24 hours have been reviewed.  CBC:   Recent Labs   Lab 08/03/24  0420 08/04/24  0432   WBC 7.31 6.39   HGB 9.2* 8.5*   HCT 27.0* 25.9*    268     CMP:   Recent Labs   Lab 08/03/24  0420 08/04/24  0432    140   K 3.8 3.9    107   CO2 25 28   GLU 89 132*   BUN 23* 22*   CREATININE 1.6* 1.4   CALCIUM 9.4 9.0   PROT 6.8 6.3   ALBUMIN 3.3* 3.0*   BILITOT 0.5 0.4   ALKPHOS 74 72   AST 22 16   ALT 22 15   ANIONGAP 8 5*       Significant Imaging: I have reviewed all pertinent imaging results/findings within the past 24 hours.

## 2024-08-05 LAB
ALBUMIN SERPL BCP-MCNC: 3.1 G/DL (ref 3.5–5.2)
ALP SERPL-CCNC: 69 U/L (ref 55–135)
ALT SERPL W/O P-5'-P-CCNC: 14 U/L (ref 10–44)
ANION GAP SERPL CALC-SCNC: 8 MMOL/L (ref 8–16)
APTT PPP: 47 SEC (ref 21–32)
APTT PPP: 49.5 SEC (ref 21–32)
AST SERPL-CCNC: 18 U/L (ref 10–40)
BASOPHILS # BLD AUTO: 0.04 K/UL (ref 0–0.2)
BASOPHILS NFR BLD: 0.6 % (ref 0–1.9)
BILIRUB SERPL-MCNC: 0.5 MG/DL (ref 0.1–1)
BUN SERPL-MCNC: 20 MG/DL (ref 6–20)
CALCIUM SERPL-MCNC: 9.2 MG/DL (ref 8.7–10.5)
CHLORIDE SERPL-SCNC: 107 MMOL/L (ref 95–110)
CO2 SERPL-SCNC: 25 MMOL/L (ref 23–29)
CREAT SERPL-MCNC: 1.3 MG/DL (ref 0.5–1.4)
DIFFERENTIAL METHOD BLD: ABNORMAL
EOSINOPHIL # BLD AUTO: 0.4 K/UL (ref 0–0.5)
EOSINOPHIL NFR BLD: 5.2 % (ref 0–8)
ERYTHROCYTE [DISTWIDTH] IN BLOOD BY AUTOMATED COUNT: 17.8 % (ref 11.5–14.5)
EST. GFR  (NO RACE VARIABLE): 51.4 ML/MIN/1.73 M^2
GLUCOSE SERPL-MCNC: 87 MG/DL (ref 70–110)
HCT VFR BLD AUTO: 26.7 % (ref 37–48.5)
HGB BLD-MCNC: 8.7 G/DL (ref 12–16)
IMM GRANULOCYTES # BLD AUTO: 0.01 K/UL (ref 0–0.04)
IMM GRANULOCYTES NFR BLD AUTO: 0.1 % (ref 0–0.5)
LYMPHOCYTES # BLD AUTO: 3 K/UL (ref 1–4.8)
LYMPHOCYTES NFR BLD: 44.9 % (ref 18–48)
MAGNESIUM SERPL-MCNC: 1.8 MG/DL (ref 1.6–2.6)
MCH RBC QN AUTO: 23.3 PG (ref 27–31)
MCHC RBC AUTO-ENTMCNC: 32.6 G/DL (ref 32–36)
MCV RBC AUTO: 71 FL (ref 82–98)
MONOCYTES # BLD AUTO: 0.9 K/UL (ref 0.3–1)
MONOCYTES NFR BLD: 12.8 % (ref 4–15)
NEUTROPHILS # BLD AUTO: 2.4 K/UL (ref 1.8–7.7)
NEUTROPHILS NFR BLD: 36.4 % (ref 38–73)
NRBC BLD-RTO: 2 /100 WBC
PHOSPHATE SERPL-MCNC: 3.3 MG/DL (ref 2.7–4.5)
PLATELET # BLD AUTO: 286 K/UL (ref 150–450)
PMV BLD AUTO: 10.1 FL (ref 9.2–12.9)
POTASSIUM SERPL-SCNC: 4 MMOL/L (ref 3.5–5.1)
PROT SERPL-MCNC: 6.5 G/DL (ref 6–8.4)
RBC # BLD AUTO: 3.74 M/UL (ref 4–5.4)
SODIUM SERPL-SCNC: 140 MMOL/L (ref 136–145)
WBC # BLD AUTO: 6.71 K/UL (ref 3.9–12.7)

## 2024-08-05 PROCEDURE — 11000001 HC ACUTE MED/SURG PRIVATE ROOM

## 2024-08-05 PROCEDURE — 83735 ASSAY OF MAGNESIUM: CPT | Performed by: STUDENT IN AN ORGANIZED HEALTH CARE EDUCATION/TRAINING PROGRAM

## 2024-08-05 PROCEDURE — 36415 COLL VENOUS BLD VENIPUNCTURE: CPT | Performed by: STUDENT IN AN ORGANIZED HEALTH CARE EDUCATION/TRAINING PROGRAM

## 2024-08-05 PROCEDURE — 84100 ASSAY OF PHOSPHORUS: CPT | Performed by: STUDENT IN AN ORGANIZED HEALTH CARE EDUCATION/TRAINING PROGRAM

## 2024-08-05 PROCEDURE — 25000003 PHARM REV CODE 250: Performed by: STUDENT IN AN ORGANIZED HEALTH CARE EDUCATION/TRAINING PROGRAM

## 2024-08-05 PROCEDURE — 80053 COMPREHEN METABOLIC PANEL: CPT | Performed by: STUDENT IN AN ORGANIZED HEALTH CARE EDUCATION/TRAINING PROGRAM

## 2024-08-05 PROCEDURE — 63600175 PHARM REV CODE 636 W HCPCS: Performed by: STUDENT IN AN ORGANIZED HEALTH CARE EDUCATION/TRAINING PROGRAM

## 2024-08-05 PROCEDURE — 25500020 PHARM REV CODE 255: Performed by: STUDENT IN AN ORGANIZED HEALTH CARE EDUCATION/TRAINING PROGRAM

## 2024-08-05 PROCEDURE — 85730 THROMBOPLASTIN TIME PARTIAL: CPT | Performed by: STUDENT IN AN ORGANIZED HEALTH CARE EDUCATION/TRAINING PROGRAM

## 2024-08-05 PROCEDURE — 94761 N-INVAS EAR/PLS OXIMETRY MLT: CPT

## 2024-08-05 PROCEDURE — 25000003 PHARM REV CODE 250

## 2024-08-05 PROCEDURE — 85025 COMPLETE CBC W/AUTO DIFF WBC: CPT | Performed by: STUDENT IN AN ORGANIZED HEALTH CARE EDUCATION/TRAINING PROGRAM

## 2024-08-05 RX ORDER — HYDROMORPHONE HYDROCHLORIDE 2 MG/ML
4 INJECTION, SOLUTION INTRAMUSCULAR; INTRAVENOUS; SUBCUTANEOUS EVERY 4 HOURS PRN
Status: DISCONTINUED | OUTPATIENT
Start: 2024-08-05 | End: 2024-08-06

## 2024-08-05 RX ORDER — FUROSEMIDE 10 MG/ML
20 INJECTION INTRAMUSCULAR; INTRAVENOUS DAILY
Status: DISCONTINUED | OUTPATIENT
Start: 2024-08-05 | End: 2024-08-06

## 2024-08-05 RX ORDER — OXYCODONE HYDROCHLORIDE 10 MG/1
20 TABLET ORAL EVERY 4 HOURS PRN
Status: DISCONTINUED | OUTPATIENT
Start: 2024-08-05 | End: 2024-08-16 | Stop reason: HOSPADM

## 2024-08-05 RX ORDER — LACTULOSE 10 G/15ML
20 SOLUTION ORAL EVERY 6 HOURS PRN
Status: DISCONTINUED | OUTPATIENT
Start: 2024-08-05 | End: 2024-08-16 | Stop reason: HOSPADM

## 2024-08-05 RX ADMIN — DIPHENHYDRAMINE HYDROCHLORIDE 50 MG: 50 INJECTION, SOLUTION INTRAMUSCULAR; INTRAVENOUS at 10:08

## 2024-08-05 RX ADMIN — OXYCODONE HYDROCHLORIDE 20 MG: 10 TABLET ORAL at 11:08

## 2024-08-05 RX ADMIN — HYDROMORPHONE HYDROCHLORIDE 4 MG: 2 INJECTION INTRAMUSCULAR; INTRAVENOUS; SUBCUTANEOUS at 09:08

## 2024-08-05 RX ADMIN — FLUOXETINE HYDROCHLORIDE 40 MG: 20 CAPSULE ORAL at 09:08

## 2024-08-05 RX ADMIN — MORPHINE SULFATE 15 MG: 15 TABLET, EXTENDED RELEASE ORAL at 02:08

## 2024-08-05 RX ADMIN — GABAPENTIN 600 MG: 300 CAPSULE ORAL at 08:08

## 2024-08-05 RX ADMIN — DIPHENHYDRAMINE HYDROCHLORIDE 50 MG: 50 INJECTION, SOLUTION INTRAMUSCULAR; INTRAVENOUS at 03:08

## 2024-08-05 RX ADMIN — FOLIC ACID 1 MG: 1 TABLET ORAL at 09:08

## 2024-08-05 RX ADMIN — HYDROMORPHONE HYDROCHLORIDE 4 MG: 2 INJECTION INTRAMUSCULAR; INTRAVENOUS; SUBCUTANEOUS at 12:08

## 2024-08-05 RX ADMIN — FUROSEMIDE 20 MG: 10 INJECTION, SOLUTION INTRAMUSCULAR; INTRAVENOUS at 08:08

## 2024-08-05 RX ADMIN — PROCHLORPERAZINE EDISYLATE 2.5 MG: 5 INJECTION INTRAMUSCULAR; INTRAVENOUS at 02:08

## 2024-08-05 RX ADMIN — MORPHINE SULFATE 15 MG: 15 TABLET, EXTENDED RELEASE ORAL at 08:08

## 2024-08-05 RX ADMIN — ACETAMINOPHEN 1000 MG: 325 TABLET ORAL at 09:08

## 2024-08-05 RX ADMIN — TIZANIDINE 4 MG: 2 TABLET ORAL at 02:08

## 2024-08-05 RX ADMIN — HYDROMORPHONE HYDROCHLORIDE 4 MG: 2 INJECTION INTRAMUSCULAR; INTRAVENOUS; SUBCUTANEOUS at 10:08

## 2024-08-05 RX ADMIN — CARVEDILOL 25 MG: 25 TABLET, FILM COATED ORAL at 09:08

## 2024-08-05 RX ADMIN — IOHEXOL 75 ML: 350 INJECTION, SOLUTION INTRAVENOUS at 04:08

## 2024-08-05 RX ADMIN — PROMETHAZINE HYDROCHLORIDE 25 MG: 12.5 TABLET ORAL at 05:08

## 2024-08-05 RX ADMIN — SENNOSIDES 8.6 MG: 8.6 TABLET, FILM COATED ORAL at 09:08

## 2024-08-05 RX ADMIN — CARVEDILOL 25 MG: 25 TABLET, FILM COATED ORAL at 08:08

## 2024-08-05 RX ADMIN — OXYCODONE HYDROCHLORIDE 15 MG: 10 TABLET ORAL at 05:08

## 2024-08-05 RX ADMIN — DIPHENHYDRAMINE HYDROCHLORIDE 50 MG: 50 INJECTION, SOLUTION INTRAMUSCULAR; INTRAVENOUS at 12:08

## 2024-08-05 RX ADMIN — PROCHLORPERAZINE EDISYLATE 2.5 MG: 5 INJECTION INTRAMUSCULAR; INTRAVENOUS at 12:08

## 2024-08-05 RX ADMIN — MORPHINE SULFATE 15 MG: 15 TABLET, EXTENDED RELEASE ORAL at 05:08

## 2024-08-05 RX ADMIN — DIPHENHYDRAMINE HYDROCHLORIDE 50 MG: 50 INJECTION, SOLUTION INTRAMUSCULAR; INTRAVENOUS at 05:08

## 2024-08-05 RX ADMIN — HYDROMORPHONE HYDROCHLORIDE 4 MG: 2 INJECTION INTRAMUSCULAR; INTRAVENOUS; SUBCUTANEOUS at 05:08

## 2024-08-05 RX ADMIN — ACETAMINOPHEN 1000 MG: 325 TABLET ORAL at 02:08

## 2024-08-05 RX ADMIN — ACETAMINOPHEN 1000 MG: 325 TABLET ORAL at 08:08

## 2024-08-05 RX ADMIN — PROCHLORPERAZINE EDISYLATE 2.5 MG: 5 INJECTION INTRAMUSCULAR; INTRAVENOUS at 09:08

## 2024-08-05 RX ADMIN — HEPARIN SODIUM AND DEXTROSE 18 UNITS/KG/HR: 10000; 5 INJECTION INTRAVENOUS at 02:08

## 2024-08-05 RX ADMIN — AMLODIPINE BESYLATE 10 MG: 10 TABLET ORAL at 09:08

## 2024-08-05 RX ADMIN — HYDROMORPHONE HYDROCHLORIDE 4 MG: 2 INJECTION INTRAMUSCULAR; INTRAVENOUS; SUBCUTANEOUS at 03:08

## 2024-08-05 RX ADMIN — DIPHENHYDRAMINE HYDROCHLORIDE 50 MG: 50 INJECTION, SOLUTION INTRAMUSCULAR; INTRAVENOUS at 09:08

## 2024-08-05 NOTE — CONSULTS
Inpatient Radiology Consult Note    History of Present Illness:  Nazanin Malone is a 44 y.o. female with history of sickle cell disease and recurrent crises requiring exchange transfusion who has bilateral chest ports, placed at an outside hospital with interventional radiology. [place] [facility] [place] swelling and was found on US to have a partial DVT in the left IJ by her port.     IR was consulted for left chest port removal in setting of partial clot. No signs/concerns for an infectious process at this time.     Admission H&P reviewed.  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 Authorizing Provider   acetaminophen (TYLENOL) 500 MG tablet Take 1,000 mg by mouth daily as needed for Pain.    Provider, Historical   albuterol (VENTOLIN HFA) 90 mcg/actuation inhaler Inhale 2 puffs into the lungs every 6 (six) hours as needed for Wheezing or Shortness of Breath. Rescue 19   Mohan Keen MBBS   amLODIPine (NORVASC) 10 MG tablet Take 1 tablet (10 mg total) by mouth once daily. 24   Aftab Nixon MD   apixaban (ELIQUIS) 5 mg Tab Take 1 tablet (5 mg total) by mouth 2 (two) times daily. 24   Aftab Nixon MD   ascorbic acid (VITAMIN C ORAL) Take 1 capsule by mouth once daily.    Provider, Historical    carvediloL (COREG) 25 MG tablet Take 1 tablet (25 mg total) by mouth 2 (two) times daily. 7/12/24   Aftab Nixon MD   FLUoxetine 40 MG capsule Take 1 capsule (40 mg total) by mouth once daily. 7/12/24   Aftab Nixon MD   fluticasone propionate (FLONASE) 50 mcg/actuation nasal spray INSTILL 1 SPRAY INTO EACH NOSTRIL EVERY DAY 2/28/22   Pee Montgomery MD   folic acid (FOLVITE) 1 MG tablet Take 1 tablet (1 mg total) by mouth once daily. 7/12/24 9/10/24  Aftab Nixon MD   morphine (MS CONTIN) 15 MG 12 hr tablet Take 1 tablet (15 mg total) by mouth 2 (two) times daily. 7/12/24   Aftab Nixon MD   oxyCODONE-acetaminophen (PERCOCET)  mg per tablet Take 1 tablet by mouth every 6 (six) hours as needed for Pain. 7/12/24   Aftab Nixon MD   promethazine (PHENERGAN) 25 MG tablet Take 1 tablet (25 mg total) by mouth every 6 (six) hours as needed for Nausea. 7/12/24   Aftab Nixon MD   senna-docusate 8.6-50 mg (PERICOLACE) 8.6-50 mg per tablet Take 1 tablet by mouth daily as needed for Constipation. 7/12/24   Aftab Nixon MD     Scheduled Meds:    acetaminophen  1,000 mg Oral TID    amLODIPine  10 mg Oral Daily    carvediloL  25 mg Oral BID    FLUoxetine  40 mg Oral Daily    folic acid  1 mg Oral Daily    gabapentin  600 mg Oral QHS    morphine  15 mg Oral Q8H    senna  8.6 mg Oral Daily     Continuous Infusions:    heparin (porcine) in D5W  0-40 Units/kg/hr (Adjusted) Intravenous Continuous 12.2 mL/hr at 08/04/24 2017 18 Units/kg/hr at 08/04/24 2017     PRN Meds:  Current Facility-Administered Medications:     albuterol-ipratropium, 3 mL, Nebulization, Q4H PRN    bisacodyL, 10 mg, Rectal, Daily PRN    butalbital-acetaminophen-caffeine -40 mg, 1 tablet, Oral, Q4H PRN    dextrose 10%, 12.5 g, Intravenous, PRN    dextrose 10%, 25 g, Intravenous, PRN    diphenhydrAMINE, 50 mg, Intravenous, Q4H PRN    glucagon (human recombinant), 1 mg, Intramuscular, PRN    glucose, 16 g, Oral,  "PRN    glucose, 24 g, Oral, PRN    heparin (PORCINE), 60 Units/kg (Adjusted), Intravenous, PRN    heparin (PORCINE), 30 Units/kg (Adjusted), Intravenous, PRN    HYDROmorphone, 2 mg, Intravenous, Q4H PRN    HYDROmorphone, 4 mg, Intravenous, Q4H PRN    lactulose, 20 g, Oral, Q6H PRN    melatonin, 6 mg, Oral, Nightly PRN    oxyCODONE, 15 mg, Oral, Q4H PRN    prochlorperazine, 2.5 mg, Intravenous, Q6H PRN    promethazine, 25 mg, Oral, Q6H PRN    sodium chloride 0.9%, 10 mL, Intravenous, PRN    tiZANidine, 4 mg, Oral, Q8H PRN    Anticoagulants/Antiplatelets: Heparin drip    Allergies: Review of patient's allergies indicates:  No Known Allergies  Sedation Hx: have not been any systemic reactions    Labs:  No results for input(s): "INR", "PT", "PTT" in the last 168 hours.    Recent Labs   Lab 08/05/24  0210   WBC 6.71   HGB 8.7*   HCT 26.7*   MCV 71*         Recent Labs   Lab 08/05/24 0210   GLU 87      K 4.0      CO2 25   BUN 20   CREATININE 1.3   CALCIUM 9.2   MG 1.8   ALT 14   AST 18   ALBUMIN 3.1*   BILITOT 0.5     Vitals:  Temp: 98.6 °F (37 °C) (08/05/24 0837)  Pulse: 74 (08/05/24 0837)  Resp: 16 (08/05/24 0837)  BP: 128/85 (08/05/24 0837)  SpO2: 98 % (08/05/24 0611)     Plan:   - Imaging and plan reviewed with staff and the IR team. No signs of infection per primary team at this time. Recent blood cultures were negative.   - No intervention at this time. Would recommend continued anticoagulation with the heparin drip. Please reach out if there are any additional concerns/questions.     Anu Alvarado MD  Radiology, PGY IV  [facility]      "

## 2024-08-05 NOTE — PLAN OF CARE
[facility] ([facility]  Discharge Reassessment    Primary Care Provider: Pee Montgomery MD    Expected Discharge Date: 8/5/2024    Reassessment (most recent)       Discharge Reassessment - 08/05/24 0944          Discharge Reassessment    Assessment Type Discharge Planning Reassessment (P)      Did the patient's condition or plan change since previous assessment? No (P)      Discharge Plan discussed with: Patient (P)      Communicated ALYSE with patient/caregiver No (P)      Discharge Plan A Home (P)      Discharge Plan B Home (P)      DME Needed Upon Discharge  none (P)      Transition of Care Barriers None (P)      Why the patient remains in the hospital Requires continued medical care (P)         Post-Acute Status    Coverage [employer] (P)      Discharge Delays None known at this time (P)                  CM spoke with pt in room.  Dispo: home.  DME: none needed.  She will drive herself home.    MAYELA Martinez, BS, RN, CCM      Discharge Plan A and Plan B have been determined by review of patient's clinical status, future medical and therapeutic needs, and coverage/benefits for post-acute care in coordination with multidisciplinary team members.

## 2024-08-05 NOTE — ASSESSMENT & PLAN NOTE
- US Left upper extremity 8/3 with partial deep vein thrombosis of the left internal jugular vein by the port tip   - BLE Dupplex ordered  - Discussed DVT while on Eliquis 5mg BID, and is considered treatment failure  - Transitioned to heparin drip  - Hematology consulted  -- Continue hepatin drip  - CTA Chest ordered

## 2024-08-05 NOTE — NURSING
"[facility] Plan of Care Note    Dx:   Hypokalemia [E87.6]  Sickle cell pain crisis [D57.00]  Chest pain [R07.9]  Chest pain, unspecified type [R07.9]    Shift Events: none    Goals of Care: pain management, safety    Neuro: wnl seeflow chart    Vital Signs: /71 (BP Location: Left arm)   Pulse 72   Temp 98.7 °F (37.1 °C) (Oral)   Resp 18   Ht 5' 2" (1.575 m)   Wt 93.7 kg (206 lb 9.1 oz)   SpO2 (!) 91%   Breastfeeding No   BMI 37.78 kg/m²     Respiratory: room air wnl    Diet: Diet NPO      Is patient tolerating current diet? yes    GTTS: heparin gtt see protocol    Urine Output/Bowel Movement:   I/O this shift:  In: 275.4 [P.O.:120; I.V.:155.4]  Out: 2400 [Urine:2400]  Last Bowel Movement: 08/03/24      Drains/Tubes/Tube Feeds (include total output/shift):   I/O this shift:  In: 275.4 [P.O.:120; I.V.:155.4]  Out: 2400 [Urine:2400]      Lines: right cw port, accessed patent wnl      Accuchecks:none    Skin:  intact, see flow chart    Fall Risk Score: mod    Activity level? Up with assist    Any scheduled procedures? Us ble bue    Any safety concerns? Fall risk    Other: pending aptt results follow up per  Heparin gtt infusion protocol   "

## 2024-08-05 NOTE — PLAN OF CARE
Problem: Adult Inpatient Plan of Care  Goal: Plan of Care Review  Outcome: Not Progressing  Goal: Patient-Specific Goal (Individualized)  Outcome: Not Progressing  Goal: Absence of Hospital-Acquired Illness or Injury  Outcome: Not Progressing  Goal: Optimal Comfort and Wellbeing  Outcome: Not Progressing  Goal: Readiness for Transition of Care  Outcome: Not Progressing     Problem: Infection  Goal: Absence of Infection Signs and Symptoms  Outcome: Not Progressing     Problem: Acute Kidney Injury/Impairment  Goal: Fluid and Electrolyte Balance  Outcome: Not Progressing  Goal: Improved Oral Intake  Outcome: Not Progressing  Goal: Effective Renal Function  Outcome: Not Progressing     Problem: Pneumonia  Goal: Fluid Balance  Outcome: Not Progressing  Goal: Resolution of Infection Signs and Symptoms  Outcome: Not Progressing  Goal: Effective Oxygenation and Ventilation  Outcome: Not Progressing     Problem: Pain Acute  Goal: Optimal Pain Control and Function  Outcome: Not Progressing     Problem: Fall Injury Risk  Goal: Absence of Fall and Fall-Related Injury  Outcome: Not Progressing

## 2024-08-05 NOTE — SUBJECTIVE & OBJECTIVE
Interval History:   No events overnight. IR consulted and rec against left port removal and continue heparin drip. Continue left sided chest pain. CTA chest ordered.      Review of Systems   Constitutional:  Negative for activity change, appetite change, chills, diaphoresis, fatigue and fever.   HENT:  Negative for rhinorrhea and sore throat.    Respiratory:  Negative for cough, chest tightness and shortness of breath.    Cardiovascular:  Negative for chest pain, palpitations and leg swelling.   Gastrointestinal:  Negative for abdominal pain, constipation, diarrhea and nausea.   Endocrine: Negative for cold intolerance.   Genitourinary:  Negative for decreased urine volume and dysuria.   Musculoskeletal:  Positive for arthralgias (left chest/arm) and myalgias.   Skin:  Negative for rash and wound.   Neurological:  Negative for dizziness, weakness, numbness and headaches.   Psychiatric/Behavioral:  Negative for agitation, behavioral problems and confusion.      Objective:     Vital Signs (Most Recent):  Temp: 98.6 °F (37 °C) (08/05/24 0837)  Pulse: 68 (08/05/24 1259)  Resp: 16 (08/05/24 1259)  BP: 128/85 (08/05/24 0837)  SpO2: 97 % (08/05/24 1259) Vital Signs (24h Range):  Temp:  [98.6 °F (37 °C)-99.3 °F (37.4 °C)] 98.6 °F (37 °C)  Pulse:  [68-84] 68  Resp:  [16-20] 16  SpO2:  [91 %-99 %] 97 %  BP: (111-148)/(71-85) 128/85     Weight: 93.7 kg (206 lb 9.1 oz)  Body mass index is 37.78 kg/m².    Intake/Output Summary (Last 24 hours) at 8/5/2024 1316  Last data filed at 8/5/2024 1005  Gross per 24 hour   Intake 275.38 ml   Output 4550 ml   Net -4274.62 ml         Physical Exam  Constitutional:       General: She is not in acute distress.     Appearance: Normal appearance. She is obese.   HENT:      Head: Normocephalic and atraumatic.      Mouth/Throat:      Mouth: Mucous membranes are moist.   Eyes:      Extraocular Movements: Extraocular movements intact.      Conjunctiva/sclera: Conjunctivae normal.   Cardiovascular:       Rate and Rhythm: Normal rate and regular rhythm.   Pulmonary:      Effort: Pulmonary effort is normal. No respiratory distress.      Breath sounds: Normal breath sounds. No wheezing or rales.   Abdominal:      General: Abdomen is flat. Bowel sounds are normal.      Palpations: Abdomen is soft.      Tenderness: There is no abdominal tenderness. There is no guarding.   Musculoskeletal:         General: Swelling (left arm) present. No tenderness.   Skin:     Findings: No rash.      Comments: Bilateral chest port sites without erythema     Neurological:      General: No focal deficit present.      Mental Status: She is alert and oriented to person, place, and time. Mental status is at baseline.   Psychiatric:         Mood and Affect: Mood normal.         Behavior: Behavior normal.             Significant Labs: All pertinent labs within the past 24 hours have been reviewed.  CBC:   Recent Labs   Lab 08/04/24  0432 08/05/24  0210   WBC 6.39 6.71   HGB 8.5* 8.7*   HCT 25.9* 26.7*    286     CMP:   Recent Labs   Lab 08/04/24 0432 08/05/24  0210    140   K 3.9 4.0    107   CO2 28 25   * 87   BUN 22* 20   CREATININE 1.4 1.3   CALCIUM 9.0 9.2   PROT 6.3 6.5   ALBUMIN 3.0* 3.1*   BILITOT 0.4 0.5   ALKPHOS 72 69   AST 16 18   ALT 15 14   ANIONGAP 5* 8       Significant Imaging: I have reviewed all pertinent imaging results/findings within the past 24 hours.

## 2024-08-05 NOTE — PROGRESS NOTES
[facility] - [facility] S[facility] ([place] [address])  [facility] Medicine  Progress Note    Patient Name: Nazanin Malone  MRN: [id number]  Patient Class: IP- Inpatient   Admission Date: 7/28/2024  Length of Stay: 8 days  Attending Physician: Lenin Steward MD  Primary Care Provider: Pee Montgomery MD        Subjective:     Principal Problem:Sickle cell pain crisis        HPI:  Nazanin Malone is a 44 year old female with history of SC anemia with history of acute chest, beta thalassemia, HTN, obesity, iron deficiency anemia, hx of PE on apixaban, RLE fasciotomy, and recent hospitalization for bacteremia s/p antibiotic course and recent sickle cell pain crisis that presents with sickle cell pain crisis. Patient reports recurrent pain consistent with her prior sickle cell pain crisis. Her pain started 2-3 days ago which started mostly in her left chest. Pain was constant without relief despite taking her home opiate regimen. Yesterday, she noted pain in her left arm and some shortness of breath. She reports a cough with yellow sputum and felt like she was oscillating between feeling hot and cold as well as low appetite associated with nausea. Denies F/C, abdominal pain, constipation.    Of note, had recent hospitalization for staph epidermis bacteremia s/p 14 day course of IV vancomycin.    Upon arrival to the ED, patient afebrile, HDS, and saturating well on room air. Labs notable for WBC 9, Hgb 11.5 (higher than baseline), K 3.2, trop negative, BNP WNL, blood cultures x2 sent. EKG without ischemic changes. CXR without consolidation.     Overview/Hospital Course:  Patient admitted for sickle cell pain crisis. Infectious work up negative. Started on IV pain medication PRN and IVF. US of left cath/chest obtained due to superficial pain around port. US without fluid collection or mass and pain believed to be secondary to sickle cell pain crisis.     Interval History:   No events overnight. IR consulted and rec against left  port removal and continue heparin drip. Continue left sided chest pain. CTA chest ordered.      Review of Systems   Constitutional:  Negative for activity change, appetite change, chills, diaphoresis, fatigue and fever.   HENT:  Negative for rhinorrhea and sore throat.    Respiratory:  Negative for cough, chest tightness and shortness of breath.    Cardiovascular:  Negative for chest pain, palpitations and leg swelling.   Gastrointestinal:  Negative for abdominal pain, constipation, diarrhea and nausea.   Endocrine: Negative for cold intolerance.   Genitourinary:  Negative for decreased urine volume and dysuria.   Musculoskeletal:  Positive for arthralgias (left chest/arm) and myalgias.   Skin:  Negative for rash and wound.   Neurological:  Negative for dizziness, weakness, numbness and headaches.   Psychiatric/Behavioral:  Negative for agitation, behavioral problems and confusion.      Objective:     Vital Signs (Most Recent):  Temp: 98.6 °F (37 °C) (08/05/24 0837)  Pulse: 68 (08/05/24 1259)  Resp: 16 (08/05/24 1259)  BP: 128/85 (08/05/24 0837)  SpO2: 97 % (08/05/24 1259) Vital Signs (24h Range):  Temp:  [98.6 °F (37 °C)-99.3 °F (37.4 °C)] 98.6 °F (37 °C)  Pulse:  [68-84] 68  Resp:  [16-20] 16  SpO2:  [91 %-99 %] 97 %  BP: (111-148)/(71-85) 128/85     Weight: 93.7 kg (206 lb 9.1 oz)  Body mass index is 37.78 kg/m².    Intake/Output Summary (Last 24 hours) at 8/5/2024 1316  Last data filed at 8/5/2024 1005  Gross per 24 hour   Intake 275.38 ml   Output 4550 ml   Net -4274.62 ml         Physical Exam  Constitutional:       General: She is not in acute distress.     Appearance: Normal appearance. She is obese.   HENT:      Head: Normocephalic and atraumatic.      Mouth/Throat:      Mouth: Mucous membranes are moist.   Eyes:      Extraocular Movements: Extraocular movements intact.      Conjunctiva/sclera: Conjunctivae normal.   Cardiovascular:      Rate and Rhythm: Normal rate and regular rhythm.   Pulmonary:       Effort: Pulmonary effort is normal. No respiratory distress.      Breath sounds: Normal breath sounds. No wheezing or rales.   Abdominal:      General: Abdomen is flat. Bowel sounds are normal.      Palpations: Abdomen is soft.      Tenderness: There is no abdominal tenderness. There is no guarding.   Musculoskeletal:         General: Swelling (left arm) present. No tenderness.   Skin:     Findings: No rash.      Comments: Bilateral chest port sites without erythema     Neurological:      General: No focal deficit present.      Mental Status: She is alert and oriented to person, place, and time. Mental status is at baseline.   Psychiatric:         Mood and Affect: Mood normal.         Behavior: Behavior normal.             Significant Labs: All pertinent labs within the past 24 hours have been reviewed.  CBC:   Recent Labs   Lab 08/04/24 0432 08/05/24  0210   WBC 6.39 6.71   HGB 8.5* 8.7*   HCT 25.9* 26.7*    286     CMP:   Recent Labs   Lab 08/04/24 0432 08/05/24  0210    140   K 3.9 4.0    107   CO2 28 25   * 87   BUN 22* 20   CREATININE 1.4 1.3   CALCIUM 9.0 9.2   PROT 6.3 6.5   ALBUMIN 3.0* 3.1*   BILITOT 0.4 0.5   ALKPHOS 72 69   AST 16 18   ALT 15 14   ANIONGAP 5* 8       Significant Imaging: I have reviewed all pertinent imaging results/findings within the past 24 hours.    Assessment/Plan:      * Sickle cell pain crisis  Hb-SS disease with vaso-occlusive pain  Patient recently moved from [place] to [place] in June 2024. Patient reported receiving RBC exchanges every 6 week via two ports in her chest for management of her sickle cell disease. Has yet to establish care in [place].     - Hgb 11.6 on admit, at baseline  - Reticulocyte count 0.7  - Continue MS contin TID  and tizanidine PRN  - Gabapentin, APAP,  - Oxycodone 15mg PRN  - Dilaudid PRN; wean as able  - s/p IVF to prevent worsening sickling  - Adjust regimen as clinically indicated   - Given continued pain around  port, soft tissue US of neck, chest, and left arm obtained which was unremarkable. Likely pain 2/2 sickle cell pain crisis. If worsening pain may need further evaluation with CT    FLOR (acute kidney injury)  - Cr elevated to 1.5, baseline 1.0  - Likely 2/2 poor PO intake in setting of sickle cell crisis  - Improved with IVF  - Renally dosing all medications  - Avoiding nephrotoxins  - Will continue to monitor on daily labs  - Improved    Thrombosis of internal carotid, left  - US Left upper extremity 8/3 with partial deep vein thrombosis of the left internal jugular vein by the port tip   - BLE Dupplex ordered  - Discussed DVT while on Eliquis 5mg BID, and is considered treatment failure  - Transitioned to heparin drip  - Hematology consulted  -- Continue hepatin drip  - CTA Chest ordered    Hypokalemia  Patient's most recent potassium results are listed below.   Recent Labs     07/28/24  0837   K 3.2*     Plan  - Replete potassium per protocol  - Monitor potassium Daily  - Patient's hypokalemia is stable    History of pulmonary embolism  - hx noted  - continue home Eliquis     Hypertension  Chronic, controlled. Latest blood pressure and vitals reviewed-     Temp:  [99.1 °F (37.3 °C)]   Pulse:  [86-92]   Resp:  [20]   BP: (121-157)/(82-94)   SpO2:  [97 %-100 %] .   Home meds for hypertension were reviewed and noted below.   Hypertension Medications               amLODIPine (NORVASC) 10 MG tablet Take 1 tablet (10 mg total) by mouth once daily.    carvediloL (COREG) 25 MG tablet Take 1 tablet (25 mg total) by mouth 2 (two) times daily.            While in the hospital, will manage blood pressure as follows; Continue home antihypertensive regimen    Will utilize p.r.n. blood pressure medication only if patient's blood pressure greater than 180/110 and she develops symptoms such as worsening chest pain or shortness of breath.      VTE Risk Mitigation (From admission, onward)           Ordered     heparin 25,000 units  in dextrose 5% (100 units/ml) IV bolus from bag HIGH INTENSITY nomogram - [facility]  As needed (PRN)        Question:  Heparin Infusion Adjustment (DO NOT MODIFY ANSWER)  Answer:  [email or web address]\epic\Images\Pharmacy\HeparinInfusions\heparin HIGH INTENSITY nomogram for [facility] [id number].pdf    08/04/24 1331     heparin 25,000 units in dextrose 5% (100 units/ml) IV bolus from bag HIGH INTENSITY nomogram - [facility]  As needed (PRN)        Question:  Heparin Infusion Adjustment (DO NOT MODIFY ANSWER)  Answer:  [email or web address]\epic\Images\Pharmacy\HeparinInfusions\heparin HIGH INTENSITY nomogram for [facility] [id number].pdf    08/04/24 1331     heparin 25,000 units in dextrose 5% 250 mL (100 units/mL) infusion HIGH INTENSITY nomogram - [facility]  Continuous        Question:  Begin at (units/kg/hr)  Answer:  18    08/04/24 1331     Reason for No Pharmacological VTE Prophylaxis  Once        Question:  Reasons:  Answer:  Already adequately anticoagulated on oral Anticoagulants    07/28/24 1117     IP VTE HIGH RISK PATIENT  Once         07/28/24 1117                    Discharge Planning   ALYSE: 8/5/2024     Code Status: Full Code   Is the patient medically ready for discharge?:     Reason for patient still in hospital (select all that apply): Patient trending condition, Laboratory test, Treatment, Imaging, and Pending disposition  Discharge Plan A: Home   Discharge Delays: None known at this time              Lenin Steward MD  Department of Hospital Medicine   [facility] ([place])

## 2024-08-06 LAB
ALBUMIN SERPL BCP-MCNC: 3.3 G/DL (ref 3.5–5.2)
ALP SERPL-CCNC: 75 U/L (ref 55–135)
ALT SERPL W/O P-5'-P-CCNC: 13 U/L (ref 10–44)
ANION GAP SERPL CALC-SCNC: 10 MMOL/L (ref 8–16)
APTT PPP: 43.2 SEC (ref 21–32)
AST SERPL-CCNC: 20 U/L (ref 10–40)
BASOPHILS # BLD AUTO: 0.04 K/UL (ref 0–0.2)
BASOPHILS NFR BLD: 0.6 % (ref 0–1.9)
BILIRUB SERPL-MCNC: 0.5 MG/DL (ref 0.1–1)
BUN SERPL-MCNC: 22 MG/DL (ref 6–20)
CALCIUM SERPL-MCNC: 9.7 MG/DL (ref 8.7–10.5)
CHLORIDE SERPL-SCNC: 103 MMOL/L (ref 95–110)
CO2 SERPL-SCNC: 26 MMOL/L (ref 23–29)
CREAT SERPL-MCNC: 1.3 MG/DL (ref 0.5–1.4)
DIFFERENTIAL METHOD BLD: ABNORMAL
EOSINOPHIL # BLD AUTO: 0.3 K/UL (ref 0–0.5)
EOSINOPHIL NFR BLD: 5.4 % (ref 0–8)
ERYTHROCYTE [DISTWIDTH] IN BLOOD BY AUTOMATED COUNT: 17.8 % (ref 11.5–14.5)
EST. GFR  (NO RACE VARIABLE): 51.4 ML/MIN/1.73 M^2
GLUCOSE SERPL-MCNC: 82 MG/DL (ref 70–110)
HCT VFR BLD AUTO: 29.9 % (ref 37–48.5)
HGB BLD-MCNC: 9.7 G/DL (ref 12–16)
IMM GRANULOCYTES # BLD AUTO: 0.01 K/UL (ref 0–0.04)
IMM GRANULOCYTES NFR BLD AUTO: 0.2 % (ref 0–0.5)
LYMPHOCYTES # BLD AUTO: 2.9 K/UL (ref 1–4.8)
LYMPHOCYTES NFR BLD: 45.4 % (ref 18–48)
MAGNESIUM SERPL-MCNC: 1.8 MG/DL (ref 1.6–2.6)
MCH RBC QN AUTO: 24 PG (ref 27–31)
MCHC RBC AUTO-ENTMCNC: 32.4 G/DL (ref 32–36)
MCV RBC AUTO: 74 FL (ref 82–98)
MONOCYTES # BLD AUTO: 0.9 K/UL (ref 0.3–1)
MONOCYTES NFR BLD: 14.2 % (ref 4–15)
NEUTROPHILS # BLD AUTO: 2.2 K/UL (ref 1.8–7.7)
NEUTROPHILS NFR BLD: 34.2 % (ref 38–73)
NRBC BLD-RTO: 3 /100 WBC
PHOSPHATE SERPL-MCNC: 4.2 MG/DL (ref 2.7–4.5)
PLATELET # BLD AUTO: 281 K/UL (ref 150–450)
PMV BLD AUTO: 11.4 FL (ref 9.2–12.9)
POTASSIUM SERPL-SCNC: 3.9 MMOL/L (ref 3.5–5.1)
PROT SERPL-MCNC: 6.9 G/DL (ref 6–8.4)
RBC # BLD AUTO: 4.05 M/UL (ref 4–5.4)
SODIUM SERPL-SCNC: 139 MMOL/L (ref 136–145)
WBC # BLD AUTO: 6.34 K/UL (ref 3.9–12.7)

## 2024-08-06 PROCEDURE — 36415 COLL VENOUS BLD VENIPUNCTURE: CPT | Performed by: STUDENT IN AN ORGANIZED HEALTH CARE EDUCATION/TRAINING PROGRAM

## 2024-08-06 PROCEDURE — 83735 ASSAY OF MAGNESIUM: CPT | Performed by: STUDENT IN AN ORGANIZED HEALTH CARE EDUCATION/TRAINING PROGRAM

## 2024-08-06 PROCEDURE — 11000001 HC ACUTE MED/SURG PRIVATE ROOM

## 2024-08-06 PROCEDURE — 84100 ASSAY OF PHOSPHORUS: CPT | Performed by: STUDENT IN AN ORGANIZED HEALTH CARE EDUCATION/TRAINING PROGRAM

## 2024-08-06 PROCEDURE — 25000003 PHARM REV CODE 250: Performed by: STUDENT IN AN ORGANIZED HEALTH CARE EDUCATION/TRAINING PROGRAM

## 2024-08-06 PROCEDURE — 63600175 PHARM REV CODE 636 W HCPCS: Performed by: STUDENT IN AN ORGANIZED HEALTH CARE EDUCATION/TRAINING PROGRAM

## 2024-08-06 PROCEDURE — 85730 THROMBOPLASTIN TIME PARTIAL: CPT | Performed by: STUDENT IN AN ORGANIZED HEALTH CARE EDUCATION/TRAINING PROGRAM

## 2024-08-06 PROCEDURE — 85025 COMPLETE CBC W/AUTO DIFF WBC: CPT | Performed by: STUDENT IN AN ORGANIZED HEALTH CARE EDUCATION/TRAINING PROGRAM

## 2024-08-06 PROCEDURE — 80053 COMPREHEN METABOLIC PANEL: CPT | Performed by: STUDENT IN AN ORGANIZED HEALTH CARE EDUCATION/TRAINING PROGRAM

## 2024-08-06 PROCEDURE — 25000003 PHARM REV CODE 250

## 2024-08-06 RX ORDER — HYDROMORPHONE HYDROCHLORIDE 2 MG/ML
4 INJECTION, SOLUTION INTRAMUSCULAR; INTRAVENOUS; SUBCUTANEOUS EVERY 6 HOURS PRN
Status: DISCONTINUED | OUTPATIENT
Start: 2024-08-06 | End: 2024-08-06

## 2024-08-06 RX ORDER — HYDROMORPHONE HYDROCHLORIDE 2 MG/ML
3 INJECTION, SOLUTION INTRAMUSCULAR; INTRAVENOUS; SUBCUTANEOUS EVERY 4 HOURS PRN
Status: DISCONTINUED | OUTPATIENT
Start: 2024-08-06 | End: 2024-08-09

## 2024-08-06 RX ORDER — MORPHINE SULFATE 15 MG/1
15 TABLET, FILM COATED, EXTENDED RELEASE ORAL EVERY 8 HOURS
Status: DISCONTINUED | OUTPATIENT
Start: 2024-08-07 | End: 2024-08-16 | Stop reason: HOSPADM

## 2024-08-06 RX ORDER — MORPHINE SULFATE 15 MG/1
15 TABLET, FILM COATED, EXTENDED RELEASE ORAL ONCE
Status: COMPLETED | OUTPATIENT
Start: 2024-08-06 | End: 2024-08-06

## 2024-08-06 RX ORDER — FUROSEMIDE 10 MG/ML
20 INJECTION INTRAMUSCULAR; INTRAVENOUS 2 TIMES DAILY
Status: DISCONTINUED | OUTPATIENT
Start: 2024-08-06 | End: 2024-08-07

## 2024-08-06 RX ADMIN — CARVEDILOL 25 MG: 25 TABLET, FILM COATED ORAL at 09:08

## 2024-08-06 RX ADMIN — Medication 6 MG: at 09:08

## 2024-08-06 RX ADMIN — DIPHENHYDRAMINE HYDROCHLORIDE 50 MG: 50 INJECTION, SOLUTION INTRAMUSCULAR; INTRAVENOUS at 03:08

## 2024-08-06 RX ADMIN — TIZANIDINE 4 MG: 2 TABLET ORAL at 05:08

## 2024-08-06 RX ADMIN — GABAPENTIN 600 MG: 300 CAPSULE ORAL at 09:08

## 2024-08-06 RX ADMIN — DIPHENHYDRAMINE HYDROCHLORIDE 50 MG: 50 INJECTION, SOLUTION INTRAMUSCULAR; INTRAVENOUS at 05:08

## 2024-08-06 RX ADMIN — HYDROMORPHONE HYDROCHLORIDE 4 MG: 2 INJECTION INTRAMUSCULAR; INTRAVENOUS; SUBCUTANEOUS at 03:08

## 2024-08-06 RX ADMIN — TIZANIDINE 4 MG: 2 TABLET ORAL at 06:08

## 2024-08-06 RX ADMIN — FUROSEMIDE 20 MG: 10 INJECTION, SOLUTION INTRAMUSCULAR; INTRAVENOUS at 06:08

## 2024-08-06 RX ADMIN — HEPARIN SODIUM AND DEXTROSE 18 UNITS/KG/HR: 10000; 5 INJECTION INTRAVENOUS at 04:08

## 2024-08-06 RX ADMIN — PROCHLORPERAZINE EDISYLATE 2.5 MG: 5 INJECTION INTRAMUSCULAR; INTRAVENOUS at 05:08

## 2024-08-06 RX ADMIN — FUROSEMIDE 20 MG: 10 INJECTION, SOLUTION INTRAMUSCULAR; INTRAVENOUS at 01:08

## 2024-08-06 RX ADMIN — OXYCODONE HYDROCHLORIDE 20 MG: 10 TABLET ORAL at 09:08

## 2024-08-06 RX ADMIN — HYDROMORPHONE HYDROCHLORIDE 4 MG: 2 INJECTION INTRAMUSCULAR; INTRAVENOUS; SUBCUTANEOUS at 01:08

## 2024-08-06 RX ADMIN — DIPHENHYDRAMINE HYDROCHLORIDE 50 MG: 50 INJECTION, SOLUTION INTRAMUSCULAR; INTRAVENOUS at 07:08

## 2024-08-06 RX ADMIN — OXYCODONE HYDROCHLORIDE 20 MG: 10 TABLET ORAL at 05:08

## 2024-08-06 RX ADMIN — MORPHINE SULFATE 15 MG: 15 TABLET, EXTENDED RELEASE ORAL at 06:08

## 2024-08-06 RX ADMIN — ACETAMINOPHEN 1000 MG: 325 TABLET ORAL at 04:08

## 2024-08-06 RX ADMIN — PROMETHAZINE HYDROCHLORIDE 25 MG: 12.5 TABLET ORAL at 01:08

## 2024-08-06 RX ADMIN — HYDROMORPHONE HYDROCHLORIDE 3 MG: 2 INJECTION INTRAMUSCULAR; INTRAVENOUS; SUBCUTANEOUS at 07:08

## 2024-08-06 RX ADMIN — OXYCODONE HYDROCHLORIDE 20 MG: 10 TABLET ORAL at 03:08

## 2024-08-06 RX ADMIN — HYDROMORPHONE HYDROCHLORIDE 4 MG: 2 INJECTION INTRAMUSCULAR; INTRAVENOUS; SUBCUTANEOUS at 05:08

## 2024-08-06 RX ADMIN — LACTULOSE 20 G: 20 SOLUTION ORAL at 06:08

## 2024-08-06 RX ADMIN — DIPHENHYDRAMINE HYDROCHLORIDE 50 MG: 50 INJECTION, SOLUTION INTRAMUSCULAR; INTRAVENOUS at 01:08

## 2024-08-06 RX ADMIN — ACETAMINOPHEN 1000 MG: 325 TABLET ORAL at 09:08

## 2024-08-06 RX ADMIN — PROCHLORPERAZINE EDISYLATE 2.5 MG: 5 INJECTION INTRAMUSCULAR; INTRAVENOUS at 03:08

## 2024-08-06 NOTE — PLAN OF CARE
Dr. Steward requests that CM request the following medical records from [facility] [place] [facility]: discharge summaries and Heme/Onc clinic notes from the last two years.  CM called hospital [phone]; per recording, fax request to Medical Records at [phone].  Dr. Steward notified.    THERESA MartinezN, BS, RN, CCM

## 2024-08-06 NOTE — ASSESSMENT & PLAN NOTE
Patient's most recent potassium results are listed below.   Recent Labs     08/04/24  0432 08/05/24  0210 08/06/24  0520   K 3.9 4.0 3.9       Plan  - Replete potassium per protocol  - Monitor potassium Daily  - Patient's hypokalemia is stable

## 2024-08-06 NOTE — PLAN OF CARE
Problem: Adult Inpatient Plan of Care  Goal: Plan of Care Review  Outcome: Progressing  Goal: Patient-Specific Goal (Individualized)  Outcome: Progressing  Goal: Absence of Hospital-Acquired Illness or Injury  Outcome: Progressing  Goal: Optimal Comfort and Wellbeing  Outcome: Progressing  Goal: Readiness for Transition of Care  Outcome: Progressing     Problem: Infection  Goal: Absence of Infection Signs and Symptoms  Outcome: Progressing     Problem: Acute Kidney Injury/Impairment  Goal: Fluid and Electrolyte Balance  Outcome: Progressing  Goal: Improved Oral Intake  Outcome: Progressing  Goal: Effective Renal Function  Outcome: Progressing     Problem: Pneumonia  Goal: Fluid Balance  Outcome: Progressing  Goal: Resolution of Infection Signs and Symptoms  Outcome: Progressing  Goal: Effective Oxygenation and Ventilation  Outcome: Progressing     Problem: Pain Acute  Goal: Optimal Pain Control and Function  Outcome: Progressing     Problem: Fall Injury Risk  Goal: Absence of Fall and Fall-Related Injury  Outcome: Progressing     Problem: Thrombolytic Therapy  Goal: Absence of Bleeding  Outcome: Not Progressing  Goal: Unobstructed Breathing  Reactivated

## 2024-08-06 NOTE — PROGRESS NOTES
[facility] - Med S[facility] ([place] [address])  [facility] Medicine  Progress Note    Patient Name: Nazanin Malone  MRN: [id number]  Patient Class: IP- Inpatient   Admission Date: 7/28/2024  Length of Stay: 9 days  Attending Physician: Lenin Steward MD  Primary Care Provider: Pee Montgomery MD        Subjective:     Principal Problem:Sickle cell pain crisis        HPI:  Nazanin aMlone is a 44 year old female with history of SC anemia with history of acute chest, beta thalassemia, HTN, obesity, iron deficiency anemia, hx of PE on apixaban, RLE fasciotomy, and recent hospitalization for bacteremia s/p antibiotic course and recent sickle cell pain crisis that presents with sickle cell pain crisis. Patient reports recurrent pain consistent with her prior sickle cell pain crisis. Her pain started 2-3 days ago which started mostly in her left chest. Pain was constant without relief despite taking her home opiate regimen. Yesterday, she noted pain in her left arm and some shortness of breath. She reports a cough with yellow sputum and felt like she was oscillating between feeling hot and cold as well as low appetite associated with nausea. Denies F/C, abdominal pain, constipation.    Of note, had recent hospitalization for staph epidermis bacteremia s/p 14 day course of IV vancomycin.    Upon arrival to the ED, patient afebrile, HDS, and saturating well on room air. Labs notable for WBC 9, Hgb 11.5 (higher than baseline), K 3.2, trop negative, BNP WNL, blood cultures x2 sent. EKG without ischemic changes. CXR without consolidation.     Overview/Hospital Course:  Patient admitted for sickle cell pain crisis. Infectious work up negative. Started on IV pain medication PRN and IVF. US of left cath/chest obtained due to superficial pain around port. US without fluid collection or mass and pain believed to be secondary to sickle cell pain crisis.     Interval History:   No events overnight. Patient sleeping this morning and  pain better controlled. Weaning IV pain meds. Hematology following. Continue IV lasix.       Review of Systems   Constitutional:  Negative for activity change, appetite change, chills, diaphoresis, fatigue and fever.   HENT:  Negative for rhinorrhea and sore throat.    Respiratory:  Negative for cough, chest tightness and shortness of breath.    Cardiovascular:  Negative for chest pain, palpitations and leg swelling.   Gastrointestinal:  Negative for abdominal pain, constipation, diarrhea and nausea.   Endocrine: Negative for cold intolerance.   Genitourinary:  Negative for decreased urine volume and dysuria.   Musculoskeletal:  Positive for arthralgias (left chest/arm) and myalgias.   Skin:  Negative for rash and wound.   Neurological:  Negative for dizziness, weakness, numbness and headaches.   Psychiatric/Behavioral:  Negative for agitation, behavioral problems and confusion.      Objective:     Vital Signs (Most Recent):  Temp: 98.5 °F (36.9 °C) (08/06/24 1145)  Pulse: 68 (08/06/24 1155)  Resp: 19 (08/06/24 1145)  BP: 107/78 (08/06/24 1145)  SpO2: 98 % (08/06/24 1155) Vital Signs (24h Range):  Temp:  [98.4 °F (36.9 °C)-99 °F (37.2 °C)] 98.5 °F (36.9 °C)  Pulse:  [68-79] 68  Resp:  [17-20] 19  SpO2:  [92 %-100 %] 98 %  BP: (105-151)/() 107/78     Weight: 93.7 kg (206 lb 9.1 oz)  Body mass index is 37.78 kg/m².    Intake/Output Summary (Last 24 hours) at 8/6/2024 1450  Last data filed at 8/6/2024 1316  Gross per 24 hour   Intake 120 ml   Output 1000 ml   Net -880 ml         Physical Exam  Constitutional:       General: She is not in acute distress.     Appearance: Normal appearance. She is obese.   HENT:      Head: Normocephalic and atraumatic.      Mouth/Throat:      Mouth: Mucous membranes are moist.   Eyes:      Extraocular Movements: Extraocular movements intact.      Conjunctiva/sclera: Conjunctivae normal.   Cardiovascular:      Rate and Rhythm: Normal rate and regular rhythm.   Pulmonary:      Effort:  Pulmonary effort is normal. No respiratory distress.      Breath sounds: Normal breath sounds. No wheezing or rales.   Abdominal:      General: Abdomen is flat. Bowel sounds are normal.      Palpations: Abdomen is soft.      Tenderness: There is no abdominal tenderness. There is no guarding.   Musculoskeletal:         General: Swelling (left arm) present. No tenderness.   Skin:     Findings: No rash.      Comments: Bilateral chest port sites without erythema     Neurological:      General: No focal deficit present.      Mental Status: She is alert and oriented to person, place, and time. Mental status is at baseline.   Psychiatric:         Mood and Affect: Mood normal.         Behavior: Behavior normal.             Significant Labs: All pertinent labs within the past 24 hours have been reviewed.  CBC:   Recent Labs   Lab 08/05/24 0210 08/06/24  0520   WBC 6.71 6.34   HGB 8.7* 9.7*   HCT 26.7* 29.9*    281     CMP:   Recent Labs   Lab 08/05/24 0210 08/06/24  0520    139   K 4.0 3.9    103   CO2 25 26   GLU 87 82   BUN 20 22*   CREATININE 1.3 1.3   CALCIUM 9.2 9.7   PROT 6.5 6.9   ALBUMIN 3.1* 3.3*   BILITOT 0.5 0.5   ALKPHOS 69 75   AST 18 20   ALT 14 13   ANIONGAP 8 10       Significant Imaging: I have reviewed all pertinent imaging results/findings within the past 24 hours.    Assessment/Plan:      * Sickle cell pain crisis  Hb-SS disease with vaso-occlusive pain  Patient recently moved from [place] to [place] in June 2024. Patient reported receiving RBC exchanges every 6 week via two ports in her chest for management of her sickle cell disease. Has yet to establish care in [place].     - Hgb 11.6 on admit, at baseline  - Reticulocyte count 0.7  - Continue MS contin TID  and tizanidine PRN  - Gabapentin, APAP,  - Oxycodone 15mg PRN  - Dilaudid PRN; wean as able  - s/p IVF to prevent worsening sickling  - Adjust regimen as clinically indicated   - Given continued pain around port, soft  tissue US of neck, chest, and left arm obtained which was unremarkable. Likely pain 2/2 sickle cell pain crisis. If worsening pain may need further evaluation with CT    FLOR (acute kidney injury)  - Cr elevated to 1.5, baseline 1.0  - Likely 2/2 poor PO intake in setting of sickle cell crisis  - Improved with IVF  - Renally dosing all medications  - Avoiding nephrotoxins  - Will continue to monitor on daily labs  - Improved    Thrombosis of internal carotid, left  - US Left upper extremity 8/3 with partial deep vein thrombosis of the left internal jugular vein by the port tip   - BLE Dupplex ordered  - Discussed DVT while on Eliquis 5mg BID, and is considered treatment failure  - Transitioned to heparin drip  - Hematology consulted  -- Continue hepatin drip  - CTA Chest without PE    Hypokalemia  Patient's most recent potassium results are listed below.   Recent Labs     07/28/24  0837   K 3.2*     Plan  - Replete potassium per protocol  - Monitor potassium Daily  - Patient's hypokalemia is stable    History of pulmonary embolism  - hx noted  - continue home Eliquis     Hypertension  Chronic, controlled. Latest blood pressure and vitals reviewed-     Temp:  [99.1 °F (37.3 °C)]   Pulse:  [86-92]   Resp:  [20]   BP: (121-157)/(82-94)   SpO2:  [97 %-100 %] .   Home meds for hypertension were reviewed and noted below.   Hypertension Medications               amLODIPine (NORVASC) 10 MG tablet Take 1 tablet (10 mg total) by mouth once daily.    carvediloL (COREG) 25 MG tablet Take 1 tablet (25 mg total) by mouth 2 (two) times daily.            While in the hospital, will manage blood pressure as follows; Continue home antihypertensive regimen    Will utilize p.r.n. blood pressure medication only if patient's blood pressure greater than 180/110 and she develops symptoms such as worsening chest pain or shortness of breath.      VTE Risk Mitigation (From admission, onward)           Ordered     heparin 25,000 units in  dextrose 5% (100 units/ml) IV bolus from bag HIGH INTENSITY nomogram - [facility]  As needed (PRN)        Question:  Heparin Infusion Adjustment (DO NOT MODIFY ANSWER)  Answer:  [email or web address]\epic\Images\Pharmacy\HeparinInfusions\heparin HIGH INTENSITY nomogram for [facility] [id number].pdf    08/04/24 1331     heparin 25,000 units in dextrose 5% (100 units/ml) IV bolus from bag HIGH INTENSITY nomogram - [facility]  As needed (PRN)        Question:  Heparin Infusion Adjustment (DO NOT MODIFY ANSWER)  Answer:  [email or web address]\epic\Images\Pharmacy\HeparinInfusions\heparin HIGH INTENSITY nomogram for [facility] [id number].pdf    08/04/24 1331     heparin 25,000 units in dextrose 5% 250 mL (100 units/mL) infusion HIGH INTENSITY nomogram - [facility]  Continuous        Question:  Begin at (units/kg/hr)  Answer:  18    08/04/24 1331     Reason for No Pharmacological VTE Prophylaxis  Once        Question:  Reasons:  Answer:  Already adequately anticoagulated on oral Anticoagulants    07/28/24 1117     IP VTE HIGH RISK PATIENT  Once         07/28/24 1117                    Discharge Planning   ALYSE: 8/8/2024     Code Status: Full Code   Is the patient medically ready for discharge?:     Reason for patient still in hospital (select all that apply): Patient trending condition, Laboratory test, Treatment, Consult recommendations, and Pending disposition  Discharge Plan A: Home   Discharge Delays: None known at this time              Lenin Steward MD  Department of Hospital Medicine   [facility] ([place])

## 2024-08-06 NOTE — PLAN OF CARE
Problem: Adult Inpatient Plan of Care  Goal: Plan of Care Review  Outcome: Not Progressing  Goal: Optimal Comfort and Wellbeing  Outcome: Not Progressing     Problem: Pain Acute  Goal: Optimal Pain Control and Function  Outcome: Not Progressing     Problem: Thrombolytic Therapy  Goal: Absence of Bleeding  Outcome: Not Progressing

## 2024-08-06 NOTE — TREATMENT PLAN
Hematology Plan of Care    Discussed recent diagnosis of a left IJ DVT in the setting of a left sided port that she does not use. She had been off of Eliquis for months due to insurance reasons. Agree with IV heparin while IP and transition to treatment dose lovenox for discharge.    Also discussed continued symptoms of left sided neck swelling, pain, and erythema in the setting of said clot. Her pain is atypical to the sites of her usual pain crises (normally gets pain at back and hip) and are possibly related to said clot, which is exacerbated by the frequent discomfort she has had since it was placed. Would reach out to General Surgery regarding removal of left sided port since she is symptomatic and it would need to come out regardless.     Will also discuss resuming her maintenance exchange transfusion via right sided port w blood bank, for which she is overdue.    Polo Barrow MD  Hematology/Oncology PGY-V

## 2024-08-06 NOTE — ASSESSMENT & PLAN NOTE
- US Left upper extremity 8/3 with partial deep vein thrombosis of the left internal jugular vein by the port tip   - BLE Dupplex ordered  - Discussed DVT while on Eliquis 5mg BID, and is considered treatment failure  - Transitioned to heparin drip  - Hematology consulted  -- Continue hepatin drip  - CTA Chest without PE

## 2024-08-06 NOTE — SUBJECTIVE & OBJECTIVE
Interval History:   No events overnight. Patient sleeping this morning and pain better controlled. Weaning IV pain meds. Hematology following. Continue IV lasix.       Review of Systems   Constitutional:  Negative for activity change, appetite change, chills, diaphoresis, fatigue and fever.   HENT:  Negative for rhinorrhea and sore throat.    Respiratory:  Negative for cough, chest tightness and shortness of breath.    Cardiovascular:  Negative for chest pain, palpitations and leg swelling.   Gastrointestinal:  Negative for abdominal pain, constipation, diarrhea and nausea.   Endocrine: Negative for cold intolerance.   Genitourinary:  Negative for decreased urine volume and dysuria.   Musculoskeletal:  Positive for arthralgias (left chest/arm) and myalgias.   Skin:  Negative for rash and wound.   Neurological:  Negative for dizziness, weakness, numbness and headaches.   Psychiatric/Behavioral:  Negative for agitation, behavioral problems and confusion.      Objective:     Vital Signs (Most Recent):  Temp: 98.5 °F (36.9 °C) (08/06/24 1145)  Pulse: 68 (08/06/24 1155)  Resp: 19 (08/06/24 1145)  BP: 107/78 (08/06/24 1145)  SpO2: 98 % (08/06/24 1155) Vital Signs (24h Range):  Temp:  [98.4 °F (36.9 °C)-99 °F (37.2 °C)] 98.5 °F (36.9 °C)  Pulse:  [68-79] 68  Resp:  [17-20] 19  SpO2:  [92 %-100 %] 98 %  BP: (105-151)/() 107/78     Weight: 93.7 kg (206 lb 9.1 oz)  Body mass index is 37.78 kg/m².    Intake/Output Summary (Last 24 hours) at 8/6/2024 1450  Last data filed at 8/6/2024 1316  Gross per 24 hour   Intake 120 ml   Output 1000 ml   Net -880 ml         Physical Exam  Constitutional:       General: She is not in acute distress.     Appearance: Normal appearance. She is obese.   HENT:      Head: Normocephalic and atraumatic.      Mouth/Throat:      Mouth: Mucous membranes are moist.   Eyes:      Extraocular Movements: Extraocular movements intact.      Conjunctiva/sclera: Conjunctivae normal.   Cardiovascular:       Rate and Rhythm: Normal rate and regular rhythm.   Pulmonary:      Effort: Pulmonary effort is normal. No respiratory distress.      Breath sounds: Normal breath sounds. No wheezing or rales.   Abdominal:      General: Abdomen is flat. Bowel sounds are normal.      Palpations: Abdomen is soft.      Tenderness: There is no abdominal tenderness. There is no guarding.   Musculoskeletal:         General: Swelling (left arm) present. No tenderness.   Skin:     Findings: No rash.      Comments: Bilateral chest port sites without erythema     Neurological:      General: No focal deficit present.      Mental Status: She is alert and oriented to person, place, and time. Mental status is at baseline.   Psychiatric:         Mood and Affect: Mood normal.         Behavior: Behavior normal.             Significant Labs: All pertinent labs within the past 24 hours have been reviewed.  CBC:   Recent Labs   Lab 08/05/24 0210 08/06/24  0520   WBC 6.71 6.34   HGB 8.7* 9.7*   HCT 26.7* 29.9*    281     CMP:   Recent Labs   Lab 08/05/24 0210 08/06/24  0520    139   K 4.0 3.9    103   CO2 25 26   GLU 87 82   BUN 20 22*   CREATININE 1.3 1.3   CALCIUM 9.2 9.7   PROT 6.5 6.9   ALBUMIN 3.1* 3.3*   BILITOT 0.5 0.5   ALKPHOS 69 75   AST 18 20   ALT 14 13   ANIONGAP 8 10       Significant Imaging: I have reviewed all pertinent imaging results/findings within the past 24 hours.

## 2024-08-07 LAB
ABO + RH BLD: NORMAL
ALBUMIN SERPL BCP-MCNC: 2.9 G/DL (ref 3.5–5.2)
ALP SERPL-CCNC: 67 U/L (ref 55–135)
ALT SERPL W/O P-5'-P-CCNC: 16 U/L (ref 10–44)
ANION GAP SERPL CALC-SCNC: 10 MMOL/L (ref 8–16)
APTT PPP: 22.8 SEC (ref 21–32)
APTT PPP: 50.3 SEC (ref 21–32)
AST SERPL-CCNC: 27 U/L (ref 10–40)
BASOPHILS # BLD AUTO: 0.03 K/UL (ref 0–0.2)
BASOPHILS NFR BLD: 0.4 % (ref 0–1.9)
BILIRUB SERPL-MCNC: 0.5 MG/DL (ref 0.1–1)
BLD GP AB SCN CELLS X3 SERPL QL: NORMAL
BLD PROD TYP BPU: NORMAL
BLOOD UNIT EXPIRATION DATE: NORMAL
BLOOD UNIT TYPE CODE: 5100
BLOOD UNIT TYPE: NORMAL
BUN SERPL-MCNC: 20 MG/DL (ref 6–20)
CALCIUM SERPL-MCNC: 9.3 MG/DL (ref 8.7–10.5)
CHLORIDE SERPL-SCNC: 103 MMOL/L (ref 95–110)
CO2 SERPL-SCNC: 28 MMOL/L (ref 23–29)
CODING SYSTEM: NORMAL
CREAT SERPL-MCNC: 1.5 MG/DL (ref 0.5–1.4)
CROSSMATCH INTERPRETATION: NORMAL
DIFFERENTIAL METHOD BLD: ABNORMAL
DISPENSE STATUS: NORMAL
EOSINOPHIL # BLD AUTO: 0.3 K/UL (ref 0–0.5)
EOSINOPHIL NFR BLD: 4.9 % (ref 0–8)
ERYTHROCYTE [DISTWIDTH] IN BLOOD BY AUTOMATED COUNT: 17.5 % (ref 11.5–14.5)
EST. GFR  (NO RACE VARIABLE): 43.3 ML/MIN/1.73 M^2
GLUCOSE SERPL-MCNC: 82 MG/DL (ref 70–110)
HCT VFR BLD AUTO: 25.2 % (ref 37–48.5)
HGB BLD-MCNC: 8.6 G/DL (ref 12–16)
IMM GRANULOCYTES # BLD AUTO: 0.01 K/UL (ref 0–0.04)
IMM GRANULOCYTES NFR BLD AUTO: 0.1 % (ref 0–0.5)
LYMPHOCYTES # BLD AUTO: 3.6 K/UL (ref 1–4.8)
LYMPHOCYTES NFR BLD: 51.6 % (ref 18–48)
MAGNESIUM SERPL-MCNC: 1.7 MG/DL (ref 1.6–2.6)
MCH RBC QN AUTO: 24 PG (ref 27–31)
MCHC RBC AUTO-ENTMCNC: 34.1 G/DL (ref 32–36)
MCV RBC AUTO: 70 FL (ref 82–98)
MONOCYTES # BLD AUTO: 0.9 K/UL (ref 0.3–1)
MONOCYTES NFR BLD: 12.7 % (ref 4–15)
NEUTROPHILS # BLD AUTO: 2.1 K/UL (ref 1.8–7.7)
NEUTROPHILS NFR BLD: 30.3 % (ref 38–73)
NRBC BLD-RTO: 2 /100 WBC
NUM UNITS TRANS PACKED RBC: NORMAL
PHOSPHATE SERPL-MCNC: 4.5 MG/DL (ref 2.7–4.5)
PLATELET # BLD AUTO: 263 K/UL (ref 150–450)
PMV BLD AUTO: 11 FL (ref 9.2–12.9)
POTASSIUM SERPL-SCNC: 3.7 MMOL/L (ref 3.5–5.1)
PROT SERPL-MCNC: 6.1 G/DL (ref 6–8.4)
RBC # BLD AUTO: 3.59 M/UL (ref 4–5.4)
SODIUM SERPL-SCNC: 141 MMOL/L (ref 136–145)
WBC # BLD AUTO: 6.92 K/UL (ref 3.9–12.7)

## 2024-08-07 PROCEDURE — 85730 THROMBOPLASTIN TIME PARTIAL: CPT | Performed by: STUDENT IN AN ORGANIZED HEALTH CARE EDUCATION/TRAINING PROGRAM

## 2024-08-07 PROCEDURE — P9016 RBC LEUKOCYTES REDUCED: HCPCS | Performed by: PATHOLOGY

## 2024-08-07 PROCEDURE — 82626 DEHYDROEPIANDROSTERONE: CPT | Performed by: STUDENT IN AN ORGANIZED HEALTH CARE EDUCATION/TRAINING PROGRAM

## 2024-08-07 PROCEDURE — 36415 COLL VENOUS BLD VENIPUNCTURE: CPT | Performed by: STUDENT IN AN ORGANIZED HEALTH CARE EDUCATION/TRAINING PROGRAM

## 2024-08-07 PROCEDURE — 86850 RBC ANTIBODY SCREEN: CPT | Performed by: STUDENT IN AN ORGANIZED HEALTH CARE EDUCATION/TRAINING PROGRAM

## 2024-08-07 PROCEDURE — 86900 BLOOD TYPING SEROLOGIC ABO: CPT | Performed by: STUDENT IN AN ORGANIZED HEALTH CARE EDUCATION/TRAINING PROGRAM

## 2024-08-07 PROCEDURE — 63600175 PHARM REV CODE 636 W HCPCS: Performed by: PATHOLOGY

## 2024-08-07 PROCEDURE — 85025 COMPLETE CBC W/AUTO DIFF WBC: CPT | Performed by: STUDENT IN AN ORGANIZED HEALTH CARE EDUCATION/TRAINING PROGRAM

## 2024-08-07 PROCEDURE — 83020 HEMOGLOBIN ELECTROPHORESIS: CPT | Performed by: STUDENT IN AN ORGANIZED HEALTH CARE EDUCATION/TRAINING PROGRAM

## 2024-08-07 PROCEDURE — 36512 APHERESIS RBC: CPT

## 2024-08-07 PROCEDURE — 80053 COMPREHEN METABOLIC PANEL: CPT | Performed by: STUDENT IN AN ORGANIZED HEALTH CARE EDUCATION/TRAINING PROGRAM

## 2024-08-07 PROCEDURE — 63600175 PHARM REV CODE 636 W HCPCS: Performed by: STUDENT IN AN ORGANIZED HEALTH CARE EDUCATION/TRAINING PROGRAM

## 2024-08-07 PROCEDURE — 84100 ASSAY OF PHOSPHORUS: CPT | Performed by: STUDENT IN AN ORGANIZED HEALTH CARE EDUCATION/TRAINING PROGRAM

## 2024-08-07 PROCEDURE — 25000003 PHARM REV CODE 250: Performed by: PATHOLOGY

## 2024-08-07 PROCEDURE — 25000003 PHARM REV CODE 250

## 2024-08-07 PROCEDURE — 86902 BLOOD TYPE ANTIGEN DONOR EA: CPT | Performed by: STUDENT IN AN ORGANIZED HEALTH CARE EDUCATION/TRAINING PROGRAM

## 2024-08-07 PROCEDURE — 11000001 HC ACUTE MED/SURG PRIVATE ROOM

## 2024-08-07 PROCEDURE — 86920 COMPATIBILITY TEST SPIN: CPT | Performed by: PATHOLOGY

## 2024-08-07 PROCEDURE — 25000003 PHARM REV CODE 250: Performed by: STUDENT IN AN ORGANIZED HEALTH CARE EDUCATION/TRAINING PROGRAM

## 2024-08-07 PROCEDURE — 25000003 PHARM REV CODE 250: Performed by: INTERNAL MEDICINE

## 2024-08-07 PROCEDURE — 83735 ASSAY OF MAGNESIUM: CPT | Performed by: STUDENT IN AN ORGANIZED HEALTH CARE EDUCATION/TRAINING PROGRAM

## 2024-08-07 RX ORDER — HYDROXYZINE HYDROCHLORIDE 25 MG/1
25 TABLET, FILM COATED ORAL 3 TIMES DAILY PRN
Status: DISCONTINUED | OUTPATIENT
Start: 2024-08-07 | End: 2024-08-09

## 2024-08-07 RX ORDER — FUROSEMIDE 10 MG/ML
40 INJECTION INTRAMUSCULAR; INTRAVENOUS 2 TIMES DAILY
Status: DISCONTINUED | OUTPATIENT
Start: 2024-08-07 | End: 2024-08-08

## 2024-08-07 RX ORDER — DIPHENHYDRAMINE HYDROCHLORIDE 50 MG/ML
50 INJECTION INTRAMUSCULAR; INTRAVENOUS ONCE
Status: COMPLETED | OUTPATIENT
Start: 2024-08-07 | End: 2024-08-07

## 2024-08-07 RX ORDER — HYDROMORPHONE HYDROCHLORIDE 2 MG/ML
2 INJECTION, SOLUTION INTRAMUSCULAR; INTRAVENOUS; SUBCUTANEOUS ONCE
Status: DISCONTINUED | OUTPATIENT
Start: 2024-08-07 | End: 2024-08-08

## 2024-08-07 RX ORDER — ACETAMINOPHEN 325 MG/1
650 TABLET ORAL ONCE
Status: COMPLETED | OUTPATIENT
Start: 2024-08-07 | End: 2024-08-07

## 2024-08-07 RX ORDER — HEPARIN SODIUM 1000 [USP'U]/ML
2500 INJECTION, SOLUTION INTRAVENOUS; SUBCUTANEOUS ONCE
Status: COMPLETED | OUTPATIENT
Start: 2024-08-07 | End: 2024-08-07

## 2024-08-07 RX ADMIN — DIPHENHYDRAMINE HYDROCHLORIDE 50 MG: 50 INJECTION, SOLUTION INTRAMUSCULAR; INTRAVENOUS at 03:08

## 2024-08-07 RX ADMIN — DIPHENHYDRAMINE HYDROCHLORIDE 50 MG: 50 INJECTION, SOLUTION INTRAMUSCULAR; INTRAVENOUS at 11:08

## 2024-08-07 RX ADMIN — GABAPENTIN 600 MG: 300 CAPSULE ORAL at 08:08

## 2024-08-07 RX ADMIN — ACETAMINOPHEN 1000 MG: 325 TABLET ORAL at 03:08

## 2024-08-07 RX ADMIN — FLUOXETINE HYDROCHLORIDE 40 MG: 20 CAPSULE ORAL at 09:08

## 2024-08-07 RX ADMIN — HYDROMORPHONE HYDROCHLORIDE 3 MG: 2 INJECTION INTRAMUSCULAR; INTRAVENOUS; SUBCUTANEOUS at 11:08

## 2024-08-07 RX ADMIN — PROCHLORPERAZINE EDISYLATE 2.5 MG: 5 INJECTION INTRAMUSCULAR; INTRAVENOUS at 10:08

## 2024-08-07 RX ADMIN — HYDROMORPHONE HYDROCHLORIDE 3 MG: 2 INJECTION INTRAMUSCULAR; INTRAVENOUS; SUBCUTANEOUS at 12:08

## 2024-08-07 RX ADMIN — ACETAMINOPHEN 1000 MG: 325 TABLET ORAL at 09:08

## 2024-08-07 RX ADMIN — HYDROMORPHONE HYDROCHLORIDE 3 MG: 2 INJECTION INTRAMUSCULAR; INTRAVENOUS; SUBCUTANEOUS at 01:08

## 2024-08-07 RX ADMIN — CARVEDILOL 25 MG: 25 TABLET, FILM COATED ORAL at 09:08

## 2024-08-07 RX ADMIN — ACETAMINOPHEN 650 MG: 325 TABLET ORAL at 03:08

## 2024-08-07 RX ADMIN — Medication 6 MG: at 08:08

## 2024-08-07 RX ADMIN — TIZANIDINE 4 MG: 2 TABLET ORAL at 02:08

## 2024-08-07 RX ADMIN — HYDROMORPHONE HYDROCHLORIDE 3 MG: 2 INJECTION INTRAMUSCULAR; INTRAVENOUS; SUBCUTANEOUS at 05:08

## 2024-08-07 RX ADMIN — BUTALBITAL, ACETAMINOPHEN, AND CAFFEINE 1 TABLET: 50; 325; 40 TABLET ORAL at 05:08

## 2024-08-07 RX ADMIN — DIPHENHYDRAMINE HYDROCHLORIDE 50 MG: 50 INJECTION, SOLUTION INTRAMUSCULAR; INTRAVENOUS at 05:08

## 2024-08-07 RX ADMIN — OXYCODONE HYDROCHLORIDE 20 MG: 10 TABLET ORAL at 08:08

## 2024-08-07 RX ADMIN — FUROSEMIDE 20 MG: 10 INJECTION, SOLUTION INTRAMUSCULAR; INTRAVENOUS at 09:08

## 2024-08-07 RX ADMIN — TIZANIDINE 4 MG: 2 TABLET ORAL at 09:08

## 2024-08-07 RX ADMIN — AMLODIPINE BESYLATE 10 MG: 10 TABLET ORAL at 09:08

## 2024-08-07 RX ADMIN — MORPHINE SULFATE 15 MG: 15 TABLET, EXTENDED RELEASE ORAL at 09:08

## 2024-08-07 RX ADMIN — OXYCODONE HYDROCHLORIDE 20 MG: 10 TABLET ORAL at 02:08

## 2024-08-07 RX ADMIN — DIPHENHYDRAMINE HYDROCHLORIDE 50 MG: 50 INJECTION, SOLUTION INTRAMUSCULAR; INTRAVENOUS at 10:08

## 2024-08-07 RX ADMIN — DIPHENHYDRAMINE HYDROCHLORIDE 50 MG: 50 INJECTION, SOLUTION INTRAMUSCULAR; INTRAVENOUS at 12:08

## 2024-08-07 RX ADMIN — SENNOSIDES 8.6 MG: 8.6 TABLET, FILM COATED ORAL at 09:08

## 2024-08-07 RX ADMIN — FOLIC ACID 1 MG: 1 TABLET ORAL at 09:08

## 2024-08-07 RX ADMIN — HYDROMORPHONE HYDROCHLORIDE 3 MG: 2 INJECTION INTRAMUSCULAR; INTRAVENOUS; SUBCUTANEOUS at 09:08

## 2024-08-07 RX ADMIN — CARVEDILOL 25 MG: 25 TABLET, FILM COATED ORAL at 08:08

## 2024-08-07 RX ADMIN — MORPHINE SULFATE 15 MG: 15 TABLET, EXTENDED RELEASE ORAL at 01:08

## 2024-08-07 RX ADMIN — HEPARIN SODIUM 2500 UNITS: 1000 INJECTION, SOLUTION INTRAVENOUS; SUBCUTANEOUS at 03:08

## 2024-08-07 RX ADMIN — OXYCODONE HYDROCHLORIDE 20 MG: 10 TABLET ORAL at 11:08

## 2024-08-07 RX ADMIN — FUROSEMIDE 40 MG: 10 INJECTION, SOLUTION INTRAVENOUS at 08:08

## 2024-08-07 RX ADMIN — HYDROMORPHONE HYDROCHLORIDE 3 MG: 2 INJECTION INTRAMUSCULAR; INTRAVENOUS; SUBCUTANEOUS at 03:08

## 2024-08-07 RX ADMIN — HEPARIN SODIUM AND DEXTROSE 21 UNITS/KG/HR: 10000; 5 INJECTION INTRAVENOUS at 09:08

## 2024-08-07 RX ADMIN — HYDROMORPHONE HYDROCHLORIDE 3 MG: 2 INJECTION INTRAMUSCULAR; INTRAVENOUS; SUBCUTANEOUS at 07:08

## 2024-08-07 RX ADMIN — MORPHINE SULFATE 15 MG: 15 TABLET, EXTENDED RELEASE ORAL at 05:08

## 2024-08-07 RX ADMIN — TIZANIDINE 4 MG: 2 TABLET ORAL at 01:08

## 2024-08-07 NOTE — SUBJECTIVE & OBJECTIVE
Interval History:   No events overnight. Patient with continued sickle cell pain. Exchange transfusion arranged by hematology and transfusion medicine. Atarax added for anxiety. Continue IV lasix for edema.      Review of Systems   Constitutional:  Negative for activity change, appetite change, chills, diaphoresis, fatigue and fever.   HENT:  Negative for rhinorrhea and sore throat.    Respiratory:  Negative for cough, chest tightness and shortness of breath.    Cardiovascular:  Negative for chest pain, palpitations and leg swelling.   Gastrointestinal:  Negative for abdominal pain, constipation, diarrhea and nausea.   Endocrine: Negative for cold intolerance.   Genitourinary:  Negative for decreased urine volume and dysuria.   Musculoskeletal:  Positive for arthralgias (left chest/arm) and myalgias.   Skin:  Negative for rash and wound.   Neurological:  Negative for dizziness, weakness, numbness and headaches.   Psychiatric/Behavioral:  Negative for agitation, behavioral problems and confusion. The patient is nervous/anxious.      Objective:     Vital Signs (Most Recent):  Temp: 98.8 °F (37.1 °C) (08/07/24 1206)  Pulse: 78 (08/07/24 1206)  Resp: 18 (08/07/24 1332)  BP: (!) 118/55 (08/07/24 1206)  SpO2: 98 % (08/07/24 1206) Vital Signs (24h Range):  Temp:  [98.5 °F (36.9 °C)-99.1 °F (37.3 °C)] 98.8 °F (37.1 °C)  Pulse:  [69-83] 78  Resp:  [17-20] 18  SpO2:  [94 %-99 %] 98 %  BP: (106-123)/(55-70) 118/55     Weight: 93.7 kg (206 lb 9.1 oz)  Body mass index is 37.78 kg/m².    Intake/Output Summary (Last 24 hours) at 8/7/2024 1351  Last data filed at 8/7/2024 1013  Gross per 24 hour   Intake 900 ml   Output 2600 ml   Net -1700 ml         Physical Exam  Constitutional:       General: She is not in acute distress.     Appearance: Normal appearance. She is obese.   HENT:      Head: Normocephalic and atraumatic.      Mouth/Throat:      Mouth: Mucous membranes are moist.   Eyes:      Extraocular Movements: Extraocular  movements intact.      Conjunctiva/sclera: Conjunctivae normal.   Cardiovascular:      Rate and Rhythm: Normal rate and regular rhythm.   Pulmonary:      Effort: Pulmonary effort is normal. No respiratory distress.      Breath sounds: Normal breath sounds. No wheezing or rales.   Abdominal:      General: Abdomen is flat. Bowel sounds are normal.      Palpations: Abdomen is soft.      Tenderness: There is no abdominal tenderness. There is no guarding.   Musculoskeletal:         General: Swelling (left arm) present. No tenderness.   Skin:     Findings: No rash.      Comments: Bilateral chest port sites without erythema     Neurological:      General: No focal deficit present.      Mental Status: She is alert and oriented to person, place, and time. Mental status is at baseline.   Psychiatric:         Mood and Affect: Mood normal.         Behavior: Behavior normal.             Significant Labs: All pertinent labs within the past 24 hours have been reviewed.  CBC:   Recent Labs   Lab 08/06/24  0520 08/07/24  0245   WBC 6.34 6.92   HGB 9.7* 8.6*   HCT 29.9* 25.2*    263     CMP:   Recent Labs   Lab 08/06/24  0520 08/07/24  0245    141   K 3.9 3.7    103   CO2 26 28   GLU 82 82   BUN 22* 20   CREATININE 1.3 1.5*   CALCIUM 9.7 9.3   PROT 6.9 6.1   ALBUMIN 3.3* 2.9*   BILITOT 0.5 0.5   ALKPHOS 75 67   AST 20 27   ALT 13 16   ANIONGAP 10 10       Significant Imaging: I have reviewed all pertinent imaging results/findings within the past 24 hours.

## 2024-08-07 NOTE — PLAN OF CARE
Problem: Adult Inpatient Plan of Care  Goal: Plan of Care Review  Outcome: Progressing  Goal: Optimal Comfort and Wellbeing  Outcome: Progressing     Problem: Acute Kidney Injury/Impairment  Goal: Fluid and Electrolyte Balance  Outcome: Progressing  Goal: Effective Renal Function  Outcome: Progressing     Problem: Fall Injury Risk  Goal: Absence of Fall and Fall-Related Injury  Outcome: Progressing     Problem: Sickle Cell Disease  Goal: Optimal Coping with Sickle Cell Disease  Outcome: Progressing  Goal: Effective Tissue Perfusion  Outcome: Progressing  Goal: Absence of Infection Signs and Symptoms  Outcome: Progressing  Goal: Optimal Pain Control and Function  Outcome: Progressing     Problem: Thrombolytic Therapy  Goal: Absence of Bleeding  Outcome: Progressing  Goal: Unobstructed Breathing  Outcome: Progressing

## 2024-08-07 NOTE — ASSESSMENT & PLAN NOTE
Hb-SS disease with vaso-occlusive pain  Patient recently moved from [place] to [place] in June 2024. Patient reported receiving RBC exchanges every 6 week via two ports in her chest for management of her sickle cell disease. Has yet to establish care in [place].     - Hgb 11.6 on admit, at baseline  - Reticulocyte count 0.7  - Continue MS contin TID  and tizanidine PRN  - Gabapentin, APAP,  - Oxycodone 15mg PRN  - Dilaudid PRN; wean as able  - s/p IVF to prevent worsening sickling  - Hematology consulted  -- Exchange transfusion on 8/7 with assistance of transfusion medicine  -- After exchange transfusion, will reach out to General Surgery regarding removal of left sided port since she is symptomatic and it would need to come out regardless per hematology

## 2024-08-07 NOTE — PLAN OF CARE
Problem: Adult Inpatient Plan of Care  Goal: Plan of Care Review  Outcome: Not Progressing  Goal: Optimal Comfort and Wellbeing  Outcome: Not Progressing     Problem: Pain Acute  Goal: Optimal Pain Control and Function  Outcome: Not Progressing     Problem: Thrombolytic Therapy  Goal: Absence of Bleeding  Outcome: Progressing

## 2024-08-07 NOTE — CONSULTS
[facility] ([place] [address])  Transfusion Medicine  Consult Note    Patient Name: Nazanin Malone  MRN: [id number]  Admission Date: 7/28/2024  Hospital Length of Stay: 10 days  Attending Physician: Lenin Steward MD  Primary Care Provider: Pee Montgomery MD     Consults  Subjective:     Principal Problem:Sickle cell pain crisis    History of Present Illness:  44 year old female with history of SC anemia with history of acute chest, beta thalassemia, HTN, obesity, iron deficiency anemia, hx of PE on apixaban, RLE fasciotomy, and recent hospitalization for bacteremia s/p antibiotic course and recent sickle cell pain crisis.  Has been in [facility] being treated for SCD pain, bacteremia, and catheter thrombosis.      Hem Onc has asked if we can proceed with a RBCx by apheresis while inpatient, then get her on our outpatient schedule Q6 weeks.   We can do the RBCx today.     Assessment/Plan:     SCD, without Chest Syndrome or stroke carries a Category II Grade 2B indication for therapeutic plasma exchange via the 2016 Journal of Clinical Apheresis Guidelines (Chance J et al. Journal of Clinical Apheresis 2016; 31:149-162.) The TPE plan is as follows: 6u RBCx today.    No new Assessment & Plan notes have been filed under this hospital service since the last note was generated.  Service: Transfusion Medicine      Deion Rice Jr, MD  Transfusion Medicine  [facility] ([place])

## 2024-08-07 NOTE — PROGRESS NOTES
[facility] ([address] Medicine  Progress Note    Patient Name: Nazanin Malone  MRN: [id number]  Patient Class: IP- Inpatient   Admission Date: 7/28/2024  Length of Stay: 10 days  Attending Physician: Lenin Steward MD  Primary Care Provider: Pee Montgomery MD        Subjective:     Principal Problem:Sickle cell pain crisis        HPI:  Nazanin Malone is a 44 year old female with history of SC anemia with history of acute chest, beta thalassemia, HTN, obesity, iron deficiency anemia, hx of PE on apixaban, RLE fasciotomy, and recent hospitalization for bacteremia s/p antibiotic course and recent sickle cell pain crisis that presents with sickle cell pain crisis. Patient reports recurrent pain consistent with her prior sickle cell pain crisis. Her pain started 2-3 days ago which started mostly in her left chest. Pain was constant without relief despite taking her home opiate regimen. Yesterday, she noted pain in her left arm and some shortness of breath. She reports a cough with yellow sputum and felt like she was oscillating between feeling hot and cold as well as low appetite associated with nausea. Denies F/C, abdominal pain, constipation.    Of note, had recent hospitalization for staph epidermis bacteremia s/p 14 day course of IV vancomycin.    Upon arrival to the ED, patient afebrile, HDS, and saturating well on room air. Labs notable for WBC 9, Hgb 11.5 (higher than baseline), K 3.2, trop negative, BNP WNL, blood cultures x2 sent. EKG without ischemic changes. CXR without consolidation.     Overview/Hospital Course:  Patient admitted for sickle cell pain crisis. Infectious work up negative. Started on IV pain medication PRN and IVF. US of left cath/chest obtained due to superficial pain around port. US without fluid collection or mass and pain believed to be secondary to sickle cell pain crisis.     Interval History:   No events overnight. Patient with continued sickle  cell pain. Exchange transfusion arranged by hematology and transfusion medicine. Atarax added for anxiety. Continue IV lasix for edema.      Review of Systems   Constitutional:  Negative for activity change, appetite change, chills, diaphoresis, fatigue and fever.   HENT:  Negative for rhinorrhea and sore throat.    Respiratory:  Negative for cough, chest tightness and shortness of breath.    Cardiovascular:  Negative for chest pain, palpitations and leg swelling.   Gastrointestinal:  Negative for abdominal pain, constipation, diarrhea and nausea.   Endocrine: Negative for cold intolerance.   Genitourinary:  Negative for decreased urine volume and dysuria.   Musculoskeletal:  Positive for arthralgias (left chest/arm) and myalgias.   Skin:  Negative for rash and wound.   Neurological:  Negative for dizziness, weakness, numbness and headaches.   Psychiatric/Behavioral:  Negative for agitation, behavioral problems and confusion. The patient is nervous/anxious.      Objective:     Vital Signs (Most Recent):  Temp: 98.8 °F (37.1 °C) (08/07/24 1206)  Pulse: 78 (08/07/24 1206)  Resp: 18 (08/07/24 1332)  BP: (!) 118/55 (08/07/24 1206)  SpO2: 98 % (08/07/24 1206) Vital Signs (24h Range):  Temp:  [98.5 °F (36.9 °C)-99.1 °F (37.3 °C)] 98.8 °F (37.1 °C)  Pulse:  [69-83] 78  Resp:  [17-20] 18  SpO2:  [94 %-99 %] 98 %  BP: (106-123)/(55-70) 118/55     Weight: 93.7 kg (206 lb 9.1 oz)  Body mass index is 37.78 kg/m².    Intake/Output Summary (Last 24 hours) at 8/7/2024 1351  Last data filed at 8/7/2024 1013  Gross per 24 hour   Intake 900 ml   Output 2600 ml   Net -1700 ml         Physical Exam  Constitutional:       General: She is not in acute distress.     Appearance: Normal appearance. She is obese.   HENT:      Head: Normocephalic and atraumatic.      Mouth/Throat:      Mouth: Mucous membranes are moist.   Eyes:      Extraocular Movements: Extraocular movements intact.      Conjunctiva/sclera: Conjunctivae normal.    Cardiovascular:      Rate and Rhythm: Normal rate and regular rhythm.   Pulmonary:      Effort: Pulmonary effort is normal. No respiratory distress.      Breath sounds: Normal breath sounds. No wheezing or rales.   Abdominal:      General: Abdomen is flat. Bowel sounds are normal.      Palpations: Abdomen is soft.      Tenderness: There is no abdominal tenderness. There is no guarding.   Musculoskeletal:         General: Swelling (left arm) present. No tenderness.   Skin:     Findings: No rash.      Comments: Bilateral chest port sites without erythema     Neurological:      General: No focal deficit present.      Mental Status: She is alert and oriented to person, place, and time. Mental status is at baseline.   Psychiatric:         Mood and Affect: Mood normal.         Behavior: Behavior normal.             Significant Labs: All pertinent labs within the past 24 hours have been reviewed.  CBC:   Recent Labs   Lab 08/06/24 0520 08/07/24  0245   WBC 6.34 6.92   HGB 9.7* 8.6*   HCT 29.9* 25.2*    263     CMP:   Recent Labs   Lab 08/06/24 0520 08/07/24  0245    141   K 3.9 3.7    103   CO2 26 28   GLU 82 82   BUN 22* 20   CREATININE 1.3 1.5*   CALCIUM 9.7 9.3   PROT 6.9 6.1   ALBUMIN 3.3* 2.9*   BILITOT 0.5 0.5   ALKPHOS 75 67   AST 20 27   ALT 13 16   ANIONGAP 10 10       Significant Imaging: I have reviewed all pertinent imaging results/findings within the past 24 hours.    Assessment/Plan:      * Sickle cell pain crisis  Hb-SS disease with vaso-occlusive pain  Patient recently moved from [place] to [place] in June 2024. Patient reported receiving RBC exchanges every 6 week via two ports in her chest for management of her sickle cell disease. Has yet to establish care in [place].     - Hgb 11.6 on admit, at baseline  - Reticulocyte count 0.7  - Continue MS contin TID  and tizanidine PRN  - Gabapentin, APAP,  - Oxycodone 15mg PRN  - Dilaudid PRN; wean as able  - s/p IVF to prevent  worsening sickling  - Hematology consulted  -- Exchange transfusion on 8/7 with assistance of transfusion medicine  -- After exchange transfusion, will reach out to General Surgery regarding removal of left sided port since she is symptomatic and it would need to come out regardless per hematology     FLOR (acute kidney injury)  - Cr elevated to 1.5, baseline 1.0  - Likely 2/2 poor PO intake in setting of sickle cell crisis  - Improved with IVF  - Renally dosing all medications  - Avoiding nephrotoxins  - Will continue to monitor on daily labs  - Improved    Thrombosis of internal carotid, left  - US Left upper extremity 8/3 with partial deep vein thrombosis of the left internal jugular vein by the port tip   - BLE Dupplex ordered  - Discussed DVT while on Eliquis 5mg BID, and is considered treatment failure  - Transitioned to heparin drip  - CTA Chest without PE  - Hematology consulted  -- Continue hepatin drip  -- IV heparin while IP and transition to treatment dose lovenox for discharge    Hypokalemia  Patient's most recent potassium results are listed below.   Recent Labs     08/04/24  0432 08/05/24  0210 08/06/24  0520   K 3.9 4.0 3.9       Plan  - Replete potassium per protocol  - Monitor potassium Daily  - Patient's hypokalemia is stable    History of pulmonary embolism  - hx noted  - continue home Eliquis     Hypertension  Chronic, controlled. Latest blood pressure and vitals reviewed-     Temp:  [99.1 °F (37.3 °C)]   Pulse:  [86-92]   Resp:  [20]   BP: (121-157)/(82-94)   SpO2:  [97 %-100 %] .   Home meds for hypertension were reviewed and noted below.   Hypertension Medications               amLODIPine (NORVASC) 10 MG tablet Take 1 tablet (10 mg total) by mouth once daily.    carvediloL (COREG) 25 MG tablet Take 1 tablet (25 mg total) by mouth 2 (two) times daily.            While in the hospital, will manage blood pressure as follows; Continue home antihypertensive regimen    Will utilize p.r.n. blood  pressure medication only if patient's blood pressure greater than 180/110 and she develops symptoms such as worsening chest pain or shortness of breath.      VTE Risk Mitigation (From admission, onward)           Ordered     heparin (porcine) injection 2,500 Units  Once         08/07/24 1137     heparin 25,000 units in dextrose 5% (100 units/ml) IV bolus from bag HIGH INTENSITY nomogram - [facility]  As needed (PRN)        Question:  Heparin Infusion Adjustment (DO NOT MODIFY ANSWER)  Answer:  [email or web address]\epic\Images\Pharmacy\HeparinInfusions\heparin HIGH INTENSITY nomogram for [facility] [id number]    08/04/24 1331     heparin 25,000 units in dextrose 5% (100 units/ml) IV bolus from bag HIGH INTENSITY nomogram - [facility]  As needed (PRN)        Question:  Heparin Infusion Adjustment (DO NOT MODIFY ANSWER)  Answer:  [email or web address]\epic\Images\Pharmacy\HeparinInfusions\heparin HIGH INTENSITY nomogram for [facility] [id number]    08/04/24 1331     heparin 25,000 units in dextrose 5% 250 mL (100 units/mL) infusion HIGH INTENSITY nomogram - [facility]  Continuous        Question:  Begin at (units/kg/hr)  Answer:  18    08/04/24 1331     Reason for No Pharmacological VTE Prophylaxis  Once        Question:  Reasons:  Answer:  Already adequately anticoagulated on oral Anticoagulants    07/28/24 1117     IP VTE HIGH RISK PATIENT  Once         07/28/24 1117                    Discharge Planning   ALYSE: 8/9/2024     Code Status: Full Code   Is the patient medically ready for discharge?:     Reason for patient still in hospital (select all that apply): Patient trending condition, Laboratory test, Treatment, Consult recommendations, and Pending disposition  Discharge Plan A: Home   Discharge Delays: None known at this time              Lenin Steward MD  Department of Hospital Medicine   [facility] ([place])

## 2024-08-07 NOTE — ASSESSMENT & PLAN NOTE
- US Left upper extremity 8/3 with partial deep vein thrombosis of the left internal jugular vein by the port tip   - BLE Dupplex ordered  - Discussed DVT while on Eliquis 5mg BID, and is considered treatment failure  - Transitioned to heparin drip  - CTA Chest without PE  - Hematology consulted  -- Continue hepatin drip  -- IV heparin while IP and transition to treatment dose lovenox for discharge

## 2024-08-08 PROBLEM — R53.1 LEFT-SIDED WEAKNESS: Status: ACTIVE | Noted: 2024-08-08

## 2024-08-08 LAB
ALBUMIN SERPL BCP-MCNC: 2.9 G/DL (ref 3.5–5.2)
ALP SERPL-CCNC: 62 U/L (ref 55–135)
ALT SERPL W/O P-5'-P-CCNC: 19 U/L (ref 10–44)
ANION GAP SERPL CALC-SCNC: 9 MMOL/L (ref 8–16)
APTT PPP: 76.2 SEC (ref 21–32)
AST SERPL-CCNC: 33 U/L (ref 10–40)
BASOPHILS # BLD AUTO: 0.04 K/UL (ref 0–0.2)
BASOPHILS NFR BLD: 0.5 % (ref 0–1.9)
BILIRUB SERPL-MCNC: 0.7 MG/DL (ref 0.1–1)
BUN SERPL-MCNC: 24 MG/DL (ref 6–20)
CALCIUM SERPL-MCNC: 9.1 MG/DL (ref 8.7–10.5)
CHLORIDE SERPL-SCNC: 101 MMOL/L (ref 95–110)
CO2 SERPL-SCNC: 28 MMOL/L (ref 23–29)
CREAT SERPL-MCNC: 1.6 MG/DL (ref 0.5–1.4)
DIFFERENTIAL METHOD BLD: ABNORMAL
EOSINOPHIL # BLD AUTO: 0.3 K/UL (ref 0–0.5)
EOSINOPHIL NFR BLD: 4.1 % (ref 0–8)
ERYTHROCYTE [DISTWIDTH] IN BLOOD BY AUTOMATED COUNT: 19.8 % (ref 11.5–14.5)
EST. GFR  (NO RACE VARIABLE): 40 ML/MIN/1.73 M^2
GLUCOSE SERPL-MCNC: 86 MG/DL (ref 70–110)
HCT VFR BLD AUTO: 27.9 % (ref 37–48.5)
HGB BLD-MCNC: 9.6 G/DL (ref 12–16)
HYPOCHROMIA BLD QL SMEAR: ABNORMAL
IMM GRANULOCYTES # BLD AUTO: 0.03 K/UL (ref 0–0.04)
IMM GRANULOCYTES NFR BLD AUTO: 0.4 % (ref 0–0.5)
LYMPHOCYTES # BLD AUTO: 3.1 K/UL (ref 1–4.8)
LYMPHOCYTES NFR BLD: 38.1 % (ref 18–48)
MAGNESIUM SERPL-MCNC: 1.7 MG/DL (ref 1.6–2.6)
MCH RBC QN AUTO: 26.2 PG (ref 27–31)
MCHC RBC AUTO-ENTMCNC: 34.4 G/DL (ref 32–36)
MCV RBC AUTO: 76 FL (ref 82–98)
MONOCYTES # BLD AUTO: 1.2 K/UL (ref 0.3–1)
MONOCYTES NFR BLD: 14.4 % (ref 4–15)
NEUTROPHILS # BLD AUTO: 3.5 K/UL (ref 1.8–7.7)
NEUTROPHILS NFR BLD: 42.5 % (ref 38–73)
NRBC BLD-RTO: 2 /100 WBC
PHOSPHATE SERPL-MCNC: 4.4 MG/DL (ref 2.7–4.5)
PLATELET # BLD AUTO: ABNORMAL K/UL (ref 150–450)
PLATELET BLD QL SMEAR: ABNORMAL
PMV BLD AUTO: ABNORMAL FL (ref 9.2–12.9)
POIKILOCYTOSIS BLD QL SMEAR: SLIGHT
POTASSIUM SERPL-SCNC: 3.7 MMOL/L (ref 3.5–5.1)
PROT SERPL-MCNC: 6 G/DL (ref 6–8.4)
RBC # BLD AUTO: 3.67 M/UL (ref 4–5.4)
SODIUM SERPL-SCNC: 138 MMOL/L (ref 136–145)
TARGETS BLD QL SMEAR: ABNORMAL
WBC # BLD AUTO: 8.25 K/UL (ref 3.9–12.7)

## 2024-08-08 PROCEDURE — 85730 THROMBOPLASTIN TIME PARTIAL: CPT | Performed by: STUDENT IN AN ORGANIZED HEALTH CARE EDUCATION/TRAINING PROGRAM

## 2024-08-08 PROCEDURE — 84100 ASSAY OF PHOSPHORUS: CPT | Performed by: STUDENT IN AN ORGANIZED HEALTH CARE EDUCATION/TRAINING PROGRAM

## 2024-08-08 PROCEDURE — 63600175 PHARM REV CODE 636 W HCPCS: Performed by: STUDENT IN AN ORGANIZED HEALTH CARE EDUCATION/TRAINING PROGRAM

## 2024-08-08 PROCEDURE — 63600175 PHARM REV CODE 636 W HCPCS: Performed by: INTERNAL MEDICINE

## 2024-08-08 PROCEDURE — 83735 ASSAY OF MAGNESIUM: CPT | Performed by: STUDENT IN AN ORGANIZED HEALTH CARE EDUCATION/TRAINING PROGRAM

## 2024-08-08 PROCEDURE — 99223 1ST HOSP IP/OBS HIGH 75: CPT | Mod: GC,,, | Performed by: STUDENT IN AN ORGANIZED HEALTH CARE EDUCATION/TRAINING PROGRAM

## 2024-08-08 PROCEDURE — 11000001 HC ACUTE MED/SURG PRIVATE ROOM

## 2024-08-08 PROCEDURE — 80053 COMPREHEN METABOLIC PANEL: CPT | Performed by: STUDENT IN AN ORGANIZED HEALTH CARE EDUCATION/TRAINING PROGRAM

## 2024-08-08 PROCEDURE — 25500020 PHARM REV CODE 255: Performed by: INTERNAL MEDICINE

## 2024-08-08 PROCEDURE — 25000003 PHARM REV CODE 250

## 2024-08-08 PROCEDURE — 85025 COMPLETE CBC W/AUTO DIFF WBC: CPT | Performed by: STUDENT IN AN ORGANIZED HEALTH CARE EDUCATION/TRAINING PROGRAM

## 2024-08-08 PROCEDURE — 6A550Z2 PHERESIS OF PLATELETS, SINGLE: ICD-10-PCS | Performed by: PATHOLOGY

## 2024-08-08 PROCEDURE — 25000003 PHARM REV CODE 250: Performed by: STUDENT IN AN ORGANIZED HEALTH CARE EDUCATION/TRAINING PROGRAM

## 2024-08-08 RX ORDER — HYDROMORPHONE HYDROCHLORIDE 2 MG/ML
4 INJECTION, SOLUTION INTRAMUSCULAR; INTRAVENOUS; SUBCUTANEOUS ONCE
Status: COMPLETED | OUTPATIENT
Start: 2024-08-08 | End: 2024-08-08

## 2024-08-08 RX ADMIN — DIPHENHYDRAMINE HYDROCHLORIDE 50 MG: 50 INJECTION, SOLUTION INTRAMUSCULAR; INTRAVENOUS at 08:08

## 2024-08-08 RX ADMIN — PROMETHAZINE HYDROCHLORIDE 25 MG: 12.5 TABLET ORAL at 10:08

## 2024-08-08 RX ADMIN — HYDROMORPHONE HYDROCHLORIDE 4 MG: 2 INJECTION INTRAMUSCULAR; INTRAVENOUS; SUBCUTANEOUS at 11:08

## 2024-08-08 RX ADMIN — HYDROXYZINE HYDROCHLORIDE 25 MG: 25 TABLET ORAL at 01:08

## 2024-08-08 RX ADMIN — DIPHENHYDRAMINE HYDROCHLORIDE 50 MG: 50 INJECTION, SOLUTION INTRAMUSCULAR; INTRAVENOUS at 04:08

## 2024-08-08 RX ADMIN — HEPARIN SODIUM AND DEXTROSE 18 UNITS/KG/HR: 10000; 5 INJECTION INTRAVENOUS at 05:08

## 2024-08-08 RX ADMIN — OXYCODONE HYDROCHLORIDE 20 MG: 10 TABLET ORAL at 05:08

## 2024-08-08 RX ADMIN — OXYCODONE HYDROCHLORIDE 20 MG: 10 TABLET ORAL at 09:08

## 2024-08-08 RX ADMIN — FLUOXETINE HYDROCHLORIDE 40 MG: 20 CAPSULE ORAL at 08:08

## 2024-08-08 RX ADMIN — FUROSEMIDE 40 MG: 10 INJECTION, SOLUTION INTRAVENOUS at 08:08

## 2024-08-08 RX ADMIN — AMLODIPINE BESYLATE 10 MG: 10 TABLET ORAL at 08:08

## 2024-08-08 RX ADMIN — MORPHINE SULFATE 15 MG: 15 TABLET, EXTENDED RELEASE ORAL at 06:08

## 2024-08-08 RX ADMIN — CARVEDILOL 25 MG: 25 TABLET, FILM COATED ORAL at 08:08

## 2024-08-08 RX ADMIN — MORPHINE SULFATE 15 MG: 15 TABLET, EXTENDED RELEASE ORAL at 09:08

## 2024-08-08 RX ADMIN — LACTULOSE 20 G: 20 SOLUTION ORAL at 11:08

## 2024-08-08 RX ADMIN — FOLIC ACID 1 MG: 1 TABLET ORAL at 08:08

## 2024-08-08 RX ADMIN — OXYCODONE HYDROCHLORIDE 20 MG: 10 TABLET ORAL at 01:08

## 2024-08-08 RX ADMIN — TIZANIDINE 4 MG: 2 TABLET ORAL at 10:08

## 2024-08-08 RX ADMIN — DIPHENHYDRAMINE HYDROCHLORIDE 50 MG: 50 INJECTION, SOLUTION INTRAMUSCULAR; INTRAVENOUS at 12:08

## 2024-08-08 RX ADMIN — GABAPENTIN 600 MG: 300 CAPSULE ORAL at 08:08

## 2024-08-08 RX ADMIN — HYDROXYZINE HYDROCHLORIDE 25 MG: 25 TABLET ORAL at 06:08

## 2024-08-08 RX ADMIN — HYDROMORPHONE HYDROCHLORIDE 3 MG: 2 INJECTION INTRAMUSCULAR; INTRAVENOUS; SUBCUTANEOUS at 04:08

## 2024-08-08 RX ADMIN — SENNOSIDES 8.6 MG: 8.6 TABLET, FILM COATED ORAL at 08:08

## 2024-08-08 RX ADMIN — HYDROMORPHONE HYDROCHLORIDE 3 MG: 2 INJECTION INTRAMUSCULAR; INTRAVENOUS; SUBCUTANEOUS at 09:08

## 2024-08-08 RX ADMIN — HYDROMORPHONE HYDROCHLORIDE 3 MG: 2 INJECTION INTRAMUSCULAR; INTRAVENOUS; SUBCUTANEOUS at 08:08

## 2024-08-08 RX ADMIN — IOHEXOL 100 ML: 350 INJECTION, SOLUTION INTRAVENOUS at 04:08

## 2024-08-08 RX ADMIN — OXYCODONE HYDROCHLORIDE 20 MG: 10 TABLET ORAL at 06:08

## 2024-08-08 RX ADMIN — OXYCODONE HYDROCHLORIDE 20 MG: 10 TABLET ORAL at 10:08

## 2024-08-08 RX ADMIN — HYDROMORPHONE HYDROCHLORIDE 3 MG: 2 INJECTION INTRAMUSCULAR; INTRAVENOUS; SUBCUTANEOUS at 05:08

## 2024-08-08 NOTE — PROGRESS NOTES
[facility] ([address] Medicine  Progress Note    Patient Name: Nazanin Malone  MRN: [id number]  Patient Class: IP- Inpatient   Admission Date: 7/28/2024  Length of Stay: 11 days  Attending Physician: Aftab Nixon MD  Primary Care Provider: Pee Montgomery MD        Subjective:     Principal Problem:Sickle cell pain crisis        HPI:  Nazanin Malone is a 44 year old female with history of SC anemia with history of acute chest, beta thalassemia, HTN, obesity, iron deficiency anemia, hx of PE on apixaban, RLE fasciotomy, and recent hospitalization for bacteremia s/p antibiotic course and recent sickle cell pain crisis that presents with sickle cell pain crisis. Patient reports recurrent pain consistent with her prior sickle cell pain crisis. Her pain started 2-3 days ago which started mostly in her left chest. Pain was constant without relief despite taking her home opiate regimen. Yesterday, she noted pain in her left arm and some shortness of breath. She reports a cough with yellow sputum and felt like she was oscillating between feeling hot and cold as well as low appetite associated with nausea. Denies F/C, abdominal pain, constipation.    Of note, had recent hospitalization for staph epidermis bacteremia s/p 14 day course of IV vancomycin.    Upon arrival to the ED, patient afebrile, HDS, and saturating well on room air. Labs notable for WBC 9, Hgb 11.5 (higher than baseline), K 3.2, trop negative, BNP WNL, blood cultures x2 sent. EKG without ischemic changes. CXR without consolidation.     Overview/Hospital Course:  Patient admitted for sickle cell pain crisis. Infectious work up negative. Started on IV pain medication PRN and IVF. US of left cath/chest obtained due to superficial pain around port. US without fluid collection or mass and pain believed to be secondary to sickle cell pain crisis.  Patient with continued in the left chest and upper extremity.  Left arm with  partial DVT in left IJ.  Anticoagulation switch from apixaban to heparin drip.  IV fluids discontinued given concern for swelling and diuresed given edema.  Bilateral lower extremity ultrasound and CT chest negative for PE/DVT.  Hematology consulted, recommended extended transfusion like she has undergone previously during her sickle cell care in [place] prior to moving to [place].    Transfusion medicine consulted. Plan for exchange transfusion with 6u pRBC 8/8. General surgery consulted for left port removal. Heparin gtt held.    Interval History: Pt seen and examined this morning on rounds. SANCHO. S/p exchange transfusion, tolerated well. General surgery consulted for left chest port removal. Heparin gtt held.        Objective:     Vital Signs (Most Recent):  Temp: 99.2 °F (37.3 °C) (08/08/24 0814)  Pulse: 76 (08/08/24 0814)  Resp: 18 (08/08/24 0814)  BP: 106/73 (08/08/24 0814)  SpO2: 97 % (08/08/24 0814) Vital Signs (24h Range):  Temp:  [98.3 °F (36.8 °C)-99.2 °F (37.3 °C)] 99.2 °F (37.3 °C)  Pulse:  [70-80] 76  Resp:  [17-22] 18  SpO2:  [97 %-99 %] 97 %  BP: ()/(51-73) 106/73     Weight: 93.7 kg (206 lb 9.1 oz)  Body mass index is 37.78 kg/m².    Intake/Output Summary (Last 24 hours) at 8/8/2024 0926  Last data filed at 8/8/2024 0816  Gross per 24 hour   Intake 3967.18 ml   Output 4413 ml   Net -445.82 ml         Physical Exam  Gen: in NAD, appears stated age, obese  Neuro: AAOx4, CN2-12 grossly intact BL; motor, sensory, and strength grossly intact BL  HEENT: NTNC, EOMI, PERRLA, MMM; no thyromegaly or lymphadenopathy; no JVD appreciated  CVS: Left and Right chest port sites. RRR, no m/r/g; S1/S2 auscultated with no S3 or S4; capillary refill < 2 sec  Resp: lungs CTAB, no w/r/r; no belabored breathing or accessory muscle use appreciated   Abd: BS+ in all 4 quadrants; NTND, soft to palpation; no organomegaly appreciated   Extrem: LUE swelling. pulses full, equal, and regular over all 4 extremities; no UE  or LE edema BL          Significant Labs: All pertinent labs within the past 24 hours have been reviewed.    Significant Imaging: I have reviewed all pertinent imaging results/findings within the past 24 hours.    Assessment/Plan:      * Sickle cell pain crisis  Hb-SS disease with vaso-occlusive pain  Patient recently moved from [place] to [place] in June 2024. Patient reported receiving RBC exchanges every 6 week via two ports in her chest for management of her sickle cell disease. Has yet to establish care in [place].     - Hgb 11.6 on admit, at baseline  - Reticulocyte count 0.7  - Continue MS contin TID  and tizanidine PRN  - Gabapentin, APAP,  - Oxycodone 15mg PRN  - Dilaudid PRN; wean as able  - s/p IVF to prevent worsening sickling  - Hematology & transfusion medicine consulted  -- S/p exchange transfusion w/ assistance of transfusion medicine  -- General Surgery consulted - plan to remove left chest port. Hep gtt held.    Thrombosis of internal carotid, left  - US Left upper extremity 8/3 with partial deep vein thrombosis of the left internal jugular vein by the port tip   - BLE Dupplex ordered  - Discussed DVT while on Eliquis 5mg BID, and is considered treatment failure  - Transitioned to heparin drip  - CTA Chest without PE  - Hematology consulted  -- Continue hepatin drip - 8/8: holding in setting of left chest port removal with general surgery  -- IV heparin while IP and transition to treatment dose lovenox for discharge    FLOR (acute kidney injury)  - Cr elevated to 1.5, baseline 1.0  - Likely 2/2 poor PO intake in setting of sickle cell crisis  - Improved with IVF  - Renally dosing all medications  - Avoiding nephrotoxins  - Will continue to monitor on daily labs  - Improved    Hypokalemia  Patient's most recent potassium results are listed below.   Recent Labs     08/04/24  0432 08/05/24  0210 08/06/24  0520   K 3.9 4.0 3.9       Plan  - Replete potassium per protocol  - Monitor potassium  Daily  - Patient's hypokalemia is stable    History of pulmonary embolism  - hx noted  - continue home Eliquis     Hypertension  Chronic, controlled. Latest blood pressure and vitals reviewed-     Temp:  [99.1 °F (37.3 °C)]   Pulse:  [86-92]   Resp:  [20]   BP: (121-157)/(82-94)   SpO2:  [97 %-100 %] .   Home meds for hypertension were reviewed and noted below.   Hypertension Medications               amLODIPine (NORVASC) 10 MG tablet Take 1 tablet (10 mg total) by mouth once daily.    carvediloL (COREG) 25 MG tablet Take 1 tablet (25 mg total) by mouth 2 (two) times daily.            While in the hospital, will manage blood pressure as follows; Continue home antihypertensive regimen    Will utilize p.r.n. blood pressure medication only if patient's blood pressure greater than 180/110 and she develops symptoms such as worsening chest pain or shortness of breath.      VTE Risk Mitigation (From admission, onward)           Ordered     heparin 25,000 units in dextrose 5% (100 units/ml) IV bolus from bag HIGH INTENSITY nomogram - [facility]  As needed (PRN)        Question:  Heparin Infusion Adjustment (DO NOT MODIFY ANSWER)  Answer:  [email or web address]\epic\Images\Pharmacy\HeparinInfusions\heparin HIGH INTENSITY nomogram for [facility] [id number]    08/04/24 1331     heparin 25,000 units in dextrose 5% (100 units/ml) IV bolus from bag HIGH INTENSITY nomogram - [facility]  As needed (PRN)        Question:  Heparin Infusion Adjustment (DO NOT MODIFY ANSWER)  Answer:  [email or web address]\epic\Images\Pharmacy\HeparinInfusions\heparin HIGH INTENSITY nomogram for [facility] [id number]    08/04/24 1331     heparin 25,000 units in dextrose 5% 250 mL (100 units/mL) infusion HIGH INTENSITY nomogram - [facility]  Continuous        Question:  Begin at (units/kg/hr)  Answer:  18    08/04/24 1331     Reason for No Pharmacological VTE Prophylaxis  Once        Question:  Reasons:  Answer:  Already adequately anticoagulated on oral Anticoagulants    07/28/24 1117     IP VTE HIGH  RISK PATIENT  Once         07/28/24 1117                    Discharge Planning   ALYSE: 8/9/2024     Code Status: Full Code   Is the patient medically ready for discharge?:     Reason for patient still in hospital (select all that apply): Treatment  Discharge Plan A: Home   Discharge Delays: None known at this time              Aftab Nixon MD  Department of Hospital Medicine   [facility] - [facility] Surg ([place]-[facility])

## 2024-08-08 NOTE — SUBJECTIVE & OBJECTIVE
Interval History: Pt seen and examined this morning on rounds. SANCHO. S/p exchange transfusion, tolerated well. General surgery consulted for left chest port removal. Heparin gtt held.        Objective:     Vital Signs (Most Recent):  Temp: 99.2 °F (37.3 °C) (08/08/24 0814)  Pulse: 76 (08/08/24 0814)  Resp: 18 (08/08/24 0814)  BP: 106/73 (08/08/24 0814)  SpO2: 97 % (08/08/24 0814) Vital Signs (24h Range):  Temp:  [98.3 °F (36.8 °C)-99.2 °F (37.3 °C)] 99.2 °F (37.3 °C)  Pulse:  [70-80] 76  Resp:  [17-22] 18  SpO2:  [97 %-99 %] 97 %  BP: ()/(51-73) 106/73     Weight: 93.7 kg (206 lb 9.1 oz)  Body mass index is 37.78 kg/m².    Intake/Output Summary (Last 24 hours) at 8/8/2024 0926  Last data filed at 8/8/2024 0816  Gross per 24 hour   Intake 3967.18 ml   Output 4413 ml   Net -445.82 ml         Physical Exam  Gen: in NAD, appears stated age, obese  Neuro: AAOx4, CN2-12 grossly intact BL; motor, sensory, and strength grossly intact BL  HEENT: NTNC, EOMI, PERRLA, MMM; no thyromegaly or lymphadenopathy; no JVD appreciated  CVS: Left and Right chest port sites. RRR, no m/r/g; S1/S2 auscultated with no S3 or S4; capillary refill < 2 sec  Resp: lungs CTAB, no w/r/r; no belabored breathing or accessory muscle use appreciated   Abd: BS+ in all 4 quadrants; NTND, soft to palpation; no organomegaly appreciated   Extrem: LUE swelling. pulses full, equal, and regular over all 4 extremities; no UE or LE edema BL          Significant Labs: All pertinent labs within the past 24 hours have been reviewed.    Significant Imaging: I have reviewed all pertinent imaging results/findings within the past 24 hours.

## 2024-08-08 NOTE — PROCEDURES
[facility] - Med S[facility] ([place] [address])  Transfusion Medicine  Procedure Note    SUMMARY   Procedures  Date of Procedure: 8/8/2024     Procedure: Red Blood Cell Exchange    Provider: Deion Rice Jr, MD     Assisting Provider: None    Pre-Procedure Diagnosis: Sickle cell pain crisis    Post-Procedure Diagnosis: Sickle cell pain crisis    Follow-up Assessment: 44 year old female with history of SC anemia with history of acute chest, beta thalassemia, HTN, obesity, iron deficiency anemia, hx of PE on apixaban, RLE fasciotomy, and recent hospitalization for bacteremia s/p antibiotic course and recent sickle cell pain crisis.  Has been in [facility] being treated for SCD pain, bacteremia, and catheter thrombosis.       RBCx by apheresis while inpatient, tolerated well.     Pertinent Laboratory Data: Complete Blood Count:   Lab Results   Component Value Date    HGB 9.6 (L) 08/08/2024    HCT 27.9 (L) 08/08/2024    PLT SEE COMMENT 08/08/2024    WBC 8.25 08/08/2024     Lactate Dehydrogenase (LDH):   Lab Results   Component Value Date     (H) 12/21/2020     Basic Metabolic Panel:   Lab Results   Component Value Date     08/08/2024    K 3.7 08/08/2024     08/08/2024    CO2 28 08/08/2024    GLU 86 08/08/2024    BUN 24 (H) 08/08/2024    CREATININE 1.6 (H) 08/08/2024    CALCIUM 9.1 08/08/2024    ANIONGAP 9 08/08/2024    ESTGFRAFRICA >60.0 03/19/2021    EGFRNONAA >60.0 03/19/2021     Comprehensive Metabolic Panel:   Lab Results   Component Value Date     08/08/2024    K 3.7 08/08/2024     08/08/2024    CO2 28 08/08/2024    GLU 86 08/08/2024    BUN 24 (H) 08/08/2024    CREATININE 1.6 (H) 08/08/2024    CALCIUM 9.1 08/08/2024    PROT 6.0 08/08/2024    ALBUMIN 2.9 (L) 08/08/2024    BILITOT 0.7 08/08/2024    ALKPHOS 62 08/08/2024    AST 33 08/08/2024    ALT 19 08/08/2024    ANIONGAP 9 08/08/2024    EGFRNONAA >60.0 03/19/2021       Pertinent Medications: n/a    Review of patient's allergies indicates:  No Known  Allergies    Anesthesia: None     Technical Procedures Used: Red Blood Cell Exchange: Approximately 6 units of packed red blood cells were exchanged.    Description of the Findings of the Procedure:     Please see Apheresis Nurse flowsheet for details.    The patient was evaluated and all clinical and laboratory data relevant to the treatment was reviewed, and a decision was made to proceed with the Apheresis procedure.    I was available to the clinical staff throughout the procedure.    Significant Surgical Tasks Conducted by the Assistant(s): Not applicable    Complications: None    Estimated Blood Loss (EBL): None    Implants: None     Specimens: None

## 2024-08-08 NOTE — PROGRESS NOTES
RBCX       Pt lying in bed upon arrival to room, pt AAOx4 with independent gait, no   distress noted, VSS. Right Medi port currently accessed with chaidez needle   with Heparin gtt infusing, order to pause infusion during RBCX. MD bedside obtaining Blood consent.                                                                                                                                                                                                                                                                      Port accessed per protocol, Dressing CDI. Single needle RBCX started at 1655 without difficulty. Pt complained of 10/10 pain, MD and floor nurse made aware, pain meds given. Replacement fluids: 6 units of Hemoglobin S Negative PRBC's. Meds: 650mg Tylenol PO, 50 mg Benadryl IVP. Tx ended at 1919. Cath flushed with NS and reaccessed with Chaidez Needle for floor access.  Pt tolerated tx well. Report given to Nereyda RN

## 2024-08-08 NOTE — ASSESSMENT & PLAN NOTE
44-year-old female with a complex medical history, presenting with new-onset neurological deficits during hospitalization for a sickle cell pain crisis. Her symptoms include bilateral lower extremity numbness and unilateral upper and lower extremity weakness. Neurological examination shows significant deficits in motor function, particularly in the LUE and LLE, complicated by pain.    Imaging:  - CTA was negative for LVO  - MRI is pending for further evaluation.    Recommendations  -- MRI brain without contrast, STAT ordered  -- Can continue anticoagulation for IJ thrombosis unless otherwise contraindicated  -- Vascular neurology will follow up with MRI, please call with questions and concerns.

## 2024-08-08 NOTE — PLAN OF CARE
Problem: Adult Inpatient Plan of Care  Goal: Plan of Care Review  Outcome: Progressing  Goal: Patient-Specific Goal (Individualized)  Outcome: Progressing  Goal: Absence of Hospital-Acquired Illness or Injury  Outcome: Progressing  Goal: Optimal Comfort and Wellbeing  Outcome: Progressing  Goal: Readiness for Transition of Care  Outcome: Progressing     Problem: Infection  Goal: Absence of Infection Signs and Symptoms  Outcome: Progressing     Problem: Acute Kidney Injury/Impairment  Goal: Fluid and Electrolyte Balance  Outcome: Progressing  Goal: Improved Oral Intake  Outcome: Progressing  Goal: Effective Renal Function  Outcome: Progressing     Problem: Pain Acute  Goal: Optimal Pain Control and Function  Outcome: Progressing     Problem: Fall Injury Risk  Goal: Absence of Fall and Fall-Related Injury  Outcome: Progressing     Problem: Thrombolytic Therapy  Goal: Absence of Bleeding  Outcome: Progressing  Goal: Unobstructed Breathing  Outcome: Progressing     Problem: Sickle Cell Disease  Goal: Optimal Coping with Sickle Cell Disease  Outcome: Progressing  Goal: Effective Tissue Perfusion  Outcome: Progressing  Goal: Absence of Infection Signs and Symptoms  Outcome: Progressing  Goal: Optimal Pain Control and Function  Outcome: Progressing  Goal: Optimal Oxygenation  Outcome: Progressing     Problem: Skin Injury Risk Increased  Goal: Skin Health and Integrity  Outcome: Progressing     Problem: Bariatric Environmental Safety  Goal: Safety Maintained with Care  Outcome: Progressing

## 2024-08-08 NOTE — SIGNIFICANT EVENT
Called to bedside by RN for patient with new weakness and inability to get out of bed. Upon physical examination, patient was AAOx4, CN2-12 intact BL, BL LE and UE sensation intact, and new left lower extremity weakness (1/5 proximal and distal leg strength). Stroke code called. Rapid arrived and took patient for stat CT-H. Will follow up CT-H,    Aftab Nixon MD  Attending Physician  Department of Hospital Medicine  8/8/2024

## 2024-08-08 NOTE — PLAN OF CARE
Problem: Adult Inpatient Plan of Care  Goal: Plan of Care Review  Outcome: Progressing  Goal: Optimal Comfort and Wellbeing  Outcome: Progressing     Problem: Acute Kidney Injury/Impairment  Goal: Fluid and Electrolyte Balance  Outcome: Progressing  Goal: Effective Renal Function  Outcome: Progressing     Problem: Sickle Cell Disease  Goal: Optimal Coping with Sickle Cell Disease  Outcome: Progressing  Goal: Effective Tissue Perfusion  Outcome: Progressing  Goal: Absence of Infection Signs and Symptoms  Outcome: Progressing  Goal: Optimal Pain Control and Function  Outcome: Progressing  Goal: Optimal Oxygenation  Outcome: Progressing     Problem: Thrombolytic Therapy  Goal: Absence of Bleeding  Outcome: Progressing  Goal: Unobstructed Breathing  Outcome: Progressing     Problem: Skin Injury Risk Increased  Goal: Skin Health and Integrity  Outcome: Progressing

## 2024-08-08 NOTE — CARE UPDATE
Care Update   General Surgery     Consulted for port removal at bedside. Patient's heparin drip was held for this. However, prior to removal stroke code called for new weakness.    Will hold off on port removal at this time. OK for anticoagulation as desired by the primary team. We will be available for removal once patient out of this acute period.     Please call us when patient OK for removal.    Yoana Baldwin MD  General Surgery PGY-V  Pager [phone]

## 2024-08-08 NOTE — NURSING
Patient's PTT this am 76.2, per nomogram, hold infusion x 1 hr and then decrease by 3 units, repeat PTT in 6hrs. Dr. Nixon notified of nomogram and pausing heparin drip x 1hr, per Dr. Nixon, general surgery wants to hold heparin drip indefinitely for now, plan to possible remove old Right chest wall port. Nursing communication placed on holding heparin drip.

## 2024-08-08 NOTE — ASSESSMENT & PLAN NOTE
- US Left upper extremity 8/3 with partial deep vein thrombosis of the left internal jugular vein by the port tip   - BLE Dupplex ordered  - Discussed DVT while on Eliquis 5mg BID, and is considered treatment failure  - Transitioned to heparin drip  - CTA Chest without PE  - Hematology consulted  -- Continue hepatin drip - 8/8: holding in setting of left chest port removal with general surgery  -- IV heparin while IP and transition to treatment dose lovenox for discharge

## 2024-08-08 NOTE — HPI
44-year-old female with a significant medical history of SC anemia, beta thalassemia, HTN, obesity, iron deficiency anemia, a history of PE on apixaban, RLE fasciotomy, and a recent hospitalization for bacteremia treated with antibiotics. She also has a recent history of a sickle cell pain crisis. The patient was admitted due to a sickle cell pain crisis, and her hospital course has been complicated by thrombosis of the L IJ, for which she is currently on a therapeutic heparin drip.    At approximately 4:00 pm, the patient noted new neurological symptoms upon waking from a nap, including numbness in both lower extremities and weakness in the LLE and LUE. The LKN was determined to be 1:30 pm. A stroke code was initiated.    On neurological examination, her NIH was 7, with specific findings as follows: LUE weakness with a score of 2, complicated by pain at the shoulder, resulting in the arm not being antigravity; LLE weakness scored at 3, with muscle contractions but without antigravity movement; RLE weakness with a score of 2, showing preserved hip flexion but not enough strength to support the entire leg. Sensation to light touch was preserved in all four extremities, and no facial droop was observed. Equivocal toe responses were noted bilaterally. A CTA was performed and was negative for LVO. An MRI is pending.

## 2024-08-08 NOTE — NURSING
Heparin drip restarted at 18u/kg/hr per clinical pharmacist recommendation, PTT to be collected at 2325. Patient updated on POC.

## 2024-08-08 NOTE — SUBJECTIVE & OBJECTIVE
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    Asthma     Depression     Hypertension     Morbid obesity     Opioid dependence 2017    Pneumonia due to Streptococcus pneumoniae 2017    Right lower lobe pneumonia 2017    Sepsis due to Streptococcus pneumoniae 2017    Sickle cell-beta thalassemia disease with pain     Trigeminal neuralgia      Past Surgical History:   Procedure Laterality Date     SECTION      TONSILLECTOMY      TUBAL LIGATION       Social History     Tobacco Use    Smoking status: Never    Smokeless tobacco: Never   Substance Use Topics    Alcohol use: No    Drug use: Yes     Types: Marijuana     Comment: periodically      Review of patient's allergies indicates:  No Known Allergies    Medications: I have reviewed the current medication administration record.    Medications Prior to Admission   Medication Sig Dispense Refill Last Dose    acetaminophen (TYLENOL) 500 MG tablet Take 1,000 mg by mouth daily as needed for Pain.       albuterol (VENTOLIN HFA) 90 mcg/actuation inhaler Inhale 2 puffs into the lungs every 6 (six) hours as needed for Wheezing or Shortness of Breath. Rescue 18 g 2     amLODIPine (NORVASC) 10 MG tablet Take 1 tablet (10 mg total) by mouth once daily. 90 tablet 0     apixaban (ELIQUIS) 5 mg Tab Take 1 tablet (5 mg total) by mouth 2 (two) times daily. 60 tablet 3     ascorbic acid (VITAMIN C ORAL) Take 1 capsule by mouth once daily.       carvediloL (COREG) 25 MG tablet Take 1 tablet (25 mg total) by mouth 2 (two) times daily. 180 tablet 2     FLUoxetine 40 MG capsule Take 1 capsule (40 mg total) by mouth once daily. 90 capsule 1     fluticasone propionate (FLONASE) 50 mcg/actuation nasal spray INSTILL 1 SPRAY INTO EACH NOSTRIL EVERY DAY 16 mL 11     folic acid (FOLVITE) 1 MG tablet Take 1 tablet (1 mg total) by mouth once daily. 60 tablet 0      morphine (MS CONTIN) 15 MG 12 hr tablet Take 1 tablet (15 mg total) by mouth 2 (two) times daily. 60 tablet 0     oxyCODONE-acetaminophen (PERCOCET)  mg per tablet Take 1 tablet by mouth every 6 (six) hours as needed for Pain. 50 tablet 0     promethazine (PHENERGAN) 25 MG tablet Take 1 tablet (25 mg total) by mouth every 6 (six) hours as needed for Nausea. 30 tablet 2     senna-docusate 8.6-50 mg (PERICOLACE) 8.6-50 mg per tablet Take 1 tablet by mouth daily as needed for Constipation.       [] tiZANidine (ZANAFLEX) 4 MG tablet Take 1 tablet (4 mg total) by mouth every 8 (eight) hours as needed. 30 tablet 0        Review of Systems   HENT:  Negative for hearing loss.    Eyes:  Negative for photophobia.   Respiratory:  Negative for shortness of breath.    Gastrointestinal:  Negative for nausea and vomiting.   Musculoskeletal:  Positive for gait problem. Negative for back pain.   Neurological:  Negative for speech difficulty and headaches.   Psychiatric/Behavioral:  Positive for confusion.      Objective:     Vital Signs (Most Recent):  Temp: 98 °F (36.7 °C) (24 1653)  Pulse: 74 (24 1758)  Resp: 16 (24 1759)  BP: 127/73 (24 1758)  SpO2: 96 % (24 175)    Vital Signs Range (Last 24H):  Temp:  [98 °F (36.7 °C)-99.2 °F (37.3 °C)]   Pulse:  [70-80]   Resp:  [14-20]   BP: ()/(51-79)   SpO2:  [95 %-99 %]        Physical Exam  Cardiovascular:      Rate and Rhythm: Normal rate.              Neurological Exam:   LOC: alert  Attention Span: Good   Language: No aphasia  Articulation: No dysarthria  Orientation: Person, Place, Time   Visual Fields: Full  EOM (CN III, IV, VI): Full/intact  Pupils (CN II, III): PERRL  Facial Sensation (CN V): Normal  Facial Movement (CN VII): Symmetric facial expression    Reflexes: 2+ throughout  Motor: Arm left  Paresis: 2/5  Leg left  Paresis: 2/5  Arm right  Normal 5/5  Leg right Paresis: 3/5  Cerebellum: No evidence of appendicular or axial  "ataxia  Sensation: Intact to light touch  Tone: Normal tone throughout        Laboratory:  CMP:   Recent Labs   Lab 24  0510   CALCIUM 9.1   ALBUMIN 2.9*   PROT 6.0      K 3.7   CO2 28      BUN 24*   CREATININE 1.6*   ALKPHOS 62   ALT 19   AST 33   BILITOT 0.7     CBC:   Recent Labs   Lab 24  0510   WBC 8.25   RBC 3.67*   HGB 9.6*   HCT 27.9*   PLT SEE COMMENT   MCV 76*   MCH 26.2*   MCHC 34.4     Lipid Panel: No results for input(s): "CHOL", "LDLCALC", "HDL", "TRIG" in the last 168 hours.  Coagulation:   Recent Labs   Lab 24  0510   APTT 76.2*     Hgb A1C: No results for input(s): "HGBA1C" in the last 168 hours.  TSH: No results for input(s): "TSH" in the last 168 hours.    Diagnostic Results:      Brain imagin2024 CTA  FINDINGS:  Intracranial Compartment:     Ventricles and sulci are normal in size for age without evidence of hydrocephalus. No extra-axial blood or fluid collections.     The brain parenchyma appears normal. No parenchymal mass, hemorrhage, edema, or major vascular distribution infarct.     Skull/Extracranial Contents (limited evaluation): No fracture. Mastoid air cells and paranasal sinuses are essentially clear.     Non-Vascular Structures of the Neck/Thoracic Inlet (limited evaluation): Normal.     Aorta: Normal 3 vessel arch.     Extracranial carotid circulation: No hemodynamically significant stenosis, aneurysmal dilatation, or dissection.     Extracranial vertebral circulation: No hemodynamically significant stenosis, aneurysmal dilatation, or dissection.     Intracranial Arteries: No focal high-grade stenosis, occlusion, or aneurysm.     Venous structures (limited evaluation): Normal.     Impression:     No acute abnormality. No high-grade stenosis or major vessel occlusion  .  "

## 2024-08-08 NOTE — SIGNIFICANT EVENT
Patient awaken by pct/nurse for vitals at 1600, patient stated she couldn't feel her BLE and couldn't move her left leg. Vitals obtained, MD and charge nurse to bedside. MD neurological eval done, stroke code called given past medical hx/current dx. Patient AAOx4, updated on plan. Stat CT head ordered RRN x 2 transported patient to CT. Patients family updated per her request at bedside on personal cell phone.

## 2024-08-08 NOTE — ASSESSMENT & PLAN NOTE
Hb-SS disease with vaso-occlusive pain  Patient recently moved from [place] to [place] in June 2024. Patient reported receiving RBC exchanges every 6 week via two ports in her chest for management of her sickle cell disease. Has yet to establish care in [place].     - Hgb 11.6 on admit, at baseline  - Reticulocyte count 0.7  - Continue MS contin TID  and tizanidine PRN  - Gabapentin, APAP,  - Oxycodone 15mg PRN  - Dilaudid PRN; wean as able  - s/p IVF to prevent worsening sickling  - Hematology & transfusion medicine consulted  -- S/p exchange transfusion w/ assistance of transfusion medicine  -- General Surgery consulted - plan to remove left chest port. Hep gtt held.

## 2024-08-08 NOTE — NURSING
"Shortly after oxy 20mg prn admin, patient keeps repeating, "it hurts, it hurts, I can't take this pain". Relating to sickle cell pain crisis. MD aware.   "

## 2024-08-08 NOTE — NURSING
Patient aware of 300ml in bladder, purewick placed per patient request to void. Awaiting results.

## 2024-08-08 NOTE — CONSULTS
[facility] - Med [facility] ([facility]-)  Vascular Neurology  Comprehensive Stroke Center  Consult Note    Inpatient consult to Vascular (Stroke) Neurology  Consult performed by: Zane Lubin MD  Consult ordered by: Aftab Nixon MD        Assessment/Plan:     Patient is a 44 y.o. year old female with:    Left-sided weakness  44-year-old female with a complex medical history, presenting with new-onset neurological deficits during hospitalization for a sickle cell pain crisis. Her symptoms include bilateral lower extremity numbness and unilateral upper and lower extremity weakness. Neurological examination shows significant deficits in motor function, particularly in the LUE and LLE, complicated by pain.     Imaging:  - CTA was negative for LVO  - MRI is pending for further evaluation.     Recommendations  -- MRI brain without contrast, STAT ordered  -- Can continue anticoagulation for IJ thrombosis unless otherwise contraindicated  -- Vascular neurology will follow up with MRI, please call with questions and concerns.      STROKE DOCUMENTATION     Acute Stroke Times   Last Known Normal Date: 08/08/24  Last Known Normal Time: 1330  Symptom Onset Date: 08/08/24  Unknown Symptom Onset Time: Unknown Time  Stroke Team Called Date: 08/08/24  Stroke Team Called Time: 1611  Stroke Team Arrival Date: 08/08/24  Stroke Team Arrival Time: 1620  CT Interpretation Time: 1648  Thrombolytic Therapy Recommended: No  CTA Interpretation Time: 1648  Thrombectomy Recommended: No    NIH Scale:  Interval: baseline  1a. Level of Consciousness: 0-->Alert, keenly responsive  1b. LOC Questions: 0-->Answers both questions correctly  1c. LOC Commands: 0-->Performs both tasks correctly  2. Best Gaze: 0-->Normal  3. Visual: 0-->No visual loss  4. Facial Palsy: 0-->Normal symmetrical movements  5a. Motor Arm, Left: 2-->Some effort against gravity, limb cannot get to or maintain (if cued) 90 (or 45) degrees, drifts down to bed, but has some  effort against gravity  5b. Motor Arm, Right: 0-->No drift, limb holds 90 (or 45) degrees for full 10 secs  6a. Motor Leg, Left: 3-->No effort against gravity, leg falls to bed immediately  6b. Motor Leg, Right: 2-->Some effort against gravity, leg falls to bed by 5 secs, but has some effort against gravity  7. Limb Ataxia: 0-->Absent  8. Sensory: 0-->Normal, no sensory loss  9. Best Language: 0-->No aphasia, normal  10. Dysarthria: 0-->Normal  11. Extinction and Inattention (formerly Neglect): 0-->No abnormality  Total (NIH Stroke Scale): 7    Modified Sebastian Score: 4  [place] Coma Scale:    ABCD2 Score:    [id number] Score:   HAS -BLED Score:   ICH Score:   Hunt & Olguin Classification:       Thrombolysis Candidate? No, On therapeutic heparin infusion    Delays to Thrombolysis?  Not Applicable    Interventional Revascularization Candidate?   Is the patient eligible for mechanical endovascular reperfusion (TYSON)?  No; No large vessel occlusion identified on imaging     Delays to Thrombectomy? Not Applicable    Hemorrhagic change of an Ischemic Stroke: Does this patient have an ischemic stroke with hemorrhagic changes? No     Subjective:     History of Present Illness:  44-year-old female with a significant medical history of SC anemia, beta thalassemia, HTN, obesity, iron deficiency anemia, a history of PE on apixaban, RLE fasciotomy, and a recent hospitalization for bacteremia treated with antibiotics. She also has a recent history of a sickle cell pain crisis. The patient was admitted due to a sickle cell pain crisis, and her hospital course has been complicated by thrombosis of the L IJ, for which she is currently on a therapeutic heparin drip.    At approximately 4:00 pm, the patient noted new neurological symptoms upon waking from a nap, including numbness in both lower extremities and weakness in the LLE and LUE. The LKN was determined to be 1:30 pm. A stroke code was initiated.    On neurological examination,  her NIH was 7, with specific findings as follows: LUE weakness with a score of 2, complicated by pain at the shoulder, resulting in the arm not being antigravity; LLE weakness scored at 3, with muscle contractions but without antigravity movement; RLE weakness with a score of 2, showing preserved hip flexion but not enough strength to support the entire leg. Sensation to light touch was preserved in all four extremities, and no facial droop was observed. Equivocal toe responses were noted bilaterally. A CTA was performed and was negative for LVO. An MRI is pending.              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    Asthma     Depression     Hypertension     Morbid obesity     Opioid dependence 2017    Pneumonia due to Streptococcus pneumoniae 2017    Right lower lobe pneumonia 2017    Sepsis due to Streptococcus pneumoniae 2017    Sickle cell-beta thalassemia disease with pain     Trigeminal neuralgia      Past Surgical History:   Procedure Laterality Date     SECTION      TONSILLECTOMY      TUBAL LIGATION       Social History     Tobacco Use    Smoking status: Never    Smokeless tobacco: Never   Substance Use Topics    Alcohol use: No    Drug use: Yes     Types: Marijuana     Comment: periodically      Review of patient's allergies indicates:  No Known Allergies    Medications: I have reviewed the current medication administration record.    Medications Prior to Admission   Medication Sig Dispense Refill Last Dose    acetaminophen (TYLENOL) 500 MG tablet Take 1,000 mg by mouth daily as needed for Pain.       albuterol (VENTOLIN HFA) 90 mcg/actuation inhaler Inhale 2 puffs into the lungs every 6 (six) hours as needed for Wheezing or Shortness of Breath. Rescue 18 g 2     amLODIPine (NORVASC) 10 MG tablet Take 1 tablet (10 mg total) by mouth once daily. 90 tablet 0      apixaban (ELIQUIS) 5 mg Tab Take 1 tablet (5 mg total) by mouth 2 (two) times daily. 60 tablet 3     ascorbic acid (VITAMIN C ORAL) Take 1 capsule by mouth once daily.       carvediloL (COREG) 25 MG tablet Take 1 tablet (25 mg total) by mouth 2 (two) times daily. 180 tablet 2     FLUoxetine 40 MG capsule Take 1 capsule (40 mg total) by mouth once daily. 90 capsule 1     fluticasone propionate (FLONASE) 50 mcg/actuation nasal spray INSTILL 1 SPRAY INTO EACH NOSTRIL EVERY DAY 16 mL 11     folic acid (FOLVITE) 1 MG tablet Take 1 tablet (1 mg total) by mouth once daily. 60 tablet 0     morphine (MS CONTIN) 15 MG 12 hr tablet Take 1 tablet (15 mg total) by mouth 2 (two) times daily. 60 tablet 0     oxyCODONE-acetaminophen (PERCOCET)  mg per tablet Take 1 tablet by mouth every 6 (six) hours as needed for Pain. 50 tablet 0     promethazine (PHENERGAN) 25 MG tablet Take 1 tablet (25 mg total) by mouth every 6 (six) hours as needed for Nausea. 30 tablet 2     senna-docusate 8.6-50 mg (PERICOLACE) 8.6-50 mg per tablet Take 1 tablet by mouth daily as needed for Constipation.       [] tiZANidine (ZANAFLEX) 4 MG tablet Take 1 tablet (4 mg total) by mouth every 8 (eight) hours as needed. 30 tablet 0        Review of Systems   HENT:  Negative for hearing loss.    Eyes:  Negative for photophobia.   Respiratory:  Negative for shortness of breath.    Gastrointestinal:  Negative for nausea and vomiting.   Musculoskeletal:  Positive for gait problem. Negative for back pain.   Neurological:  Negative for speech difficulty and headaches.   Psychiatric/Behavioral:  Positive for confusion.      Objective:     Vital Signs (Most Recent):  Temp: 98 °F (36.7 °C) (24 1653)  Pulse: 74 (24 1758)  Resp: 16 (24)  BP: 127/73 (24)  SpO2: 96 % (24)    Vital Signs Range (Last 24H):  Temp:  [98 °F (36.7 °C)-99.2 °F (37.3 °C)]   Pulse:  [70-80]   Resp:  [14-20]   BP: ()/(51-79)   SpO2:  [95  "%-99 %]        Physical Exam  Cardiovascular:      Rate and Rhythm: Normal rate.              Neurological Exam:   LOC: alert  Attention Span: Good   Language: No aphasia  Articulation: No dysarthria  Orientation: Person, Place, Time   Visual Fields: Full  EOM (CN III, IV, VI): Full/intact  Pupils (CN II, III): PERRL  Facial Sensation (CN V): Normal  Facial Movement (CN VII): Symmetric facial expression    Reflexes: 2+ throughout  Motor: Arm left  Paresis: 2/5  Leg left  Paresis: 2/5  Arm right  Normal 5/5  Leg right Paresis: 3/5  Cerebellum: No evidence of appendicular or axial ataxia  Sensation: Intact to light touch  Tone: Normal tone throughout        Laboratory:  CMP:   Recent Labs   Lab 24  0510   CALCIUM 9.1   ALBUMIN 2.9*   PROT 6.0      K 3.7   CO2 28      BUN 24*   CREATININE 1.6*   ALKPHOS 62   ALT 19   AST 33   BILITOT 0.7     CBC:   Recent Labs   Lab 24  0510   WBC 8.25   RBC 3.67*   HGB 9.6*   HCT 27.9*   PLT SEE COMMENT   MCV 76*   MCH 26.2*   MCHC 34.4     Lipid Panel: No results for input(s): "CHOL", "LDLCALC", "HDL", "TRIG" in the last 168 hours.  Coagulation:   Recent Labs   Lab 24  0510   APTT 76.2*     Hgb A1C: No results for input(s): "HGBA1C" in the last 168 hours.  TSH: No results for input(s): "TSH" in the last 168 hours.    Diagnostic Results:      Brain imagin2024 CTA  FINDINGS:  Intracranial Compartment:     Ventricles and sulci are normal in size for age without evidence of hydrocephalus. No extra-axial blood or fluid collections.     The brain parenchyma appears normal. No parenchymal mass, hemorrhage, edema, or major vascular distribution infarct.     Skull/Extracranial Contents (limited evaluation): No fracture. Mastoid air cells and paranasal sinuses are essentially clear.     Non-Vascular Structures of the Neck/Thoracic Inlet (limited evaluation): Normal.     Aorta: Normal 3 vessel arch.     Extracranial carotid circulation: No hemodynamically " significant stenosis, aneurysmal dilatation, or dissection.     Extracranial vertebral circulation: No hemodynamically significant stenosis, aneurysmal dilatation, or dissection.     Intracranial Arteries: No focal high-grade stenosis, occlusion, or aneurysm.     Venous structures (limited evaluation): Normal.     Impression:     No acute abnormality. No high-grade stenosis or major vessel occlusion  .    Zane Lubin MD  [facility]  Department of Vascular Neurology   [facility] ([place]-)

## 2024-08-08 NOTE — CARE UPDATE
"  RAPID RESPONSE NURSE STROKE CODE NOTE         Admit Date: 2024  LOS: 11  Code Status: Full Code   Date of Consult: 2024  : 1980  Age: 44 y.o.  Weight:   Wt Readings from Last 1 Encounters:   24 109.7 kg (241 lb 13.5 oz)     Sex: female  Race: Black or    Bed: 86938/25542 A:   MRN: [id number]  Time Rapid Response Team page Received: 1609  Time Rapid Response Team at Bedside: 1610  Time Rapid Response Team left Bedside: 1700  Was the patient discharged from an ICU this admission? No   Was the patient discharged from a PACU within last 24 hours? No   Did the patient receive conscious sedation/general anesthesia in last 24 hours? No  Was the patient in the ED within the past 24 hours? No  Was the patient on NIPPV within the past 24 hours? No   Did this progress into an ARC or CPA: No  Attending Physician: Aftab Nixon MD  Primary Service: [facility] MED B       SITUATION    Notified by pager.  Called to evaluate the patient for Neuro    BACKGROUND    Why is the patient in the hospital?: Sickle cell pain crisis  Patient has a past medical history of Abnormal Pap smear of cervix, 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, Asthma, Depression, Hypertension, Morbid obesity, Opioid dependence, Pneumonia due to Streptococcus pneumoniae, Right lower lobe pneumonia, Sepsis due to Streptococcus pneumoniae, Sickle cell-beta thalassemia disease with pain, and Trigeminal neuralgia.    ASSESSMENT    Last VS: /73 (BP Location: Right arm, Patient Position: Lying)   Pulse 74   Temp 98 °F (36.7 °C) (Oral)   Resp 16   Ht 5' 2" (1.575 m)   Wt 109.7 kg (241 lb 13.5 oz)   SpO2 96%   Breastfeeding No   BMI 44.23 kg/m²     24H Vital Sign Range:  Temp:  [98 °F (36.7 °C)-99.2 °F (37.3 °C)]   Pulse:  [70-80]   Resp:  [14-20]   BP: ()/(51-79)   SpO2:  [95 %-99 %]     Last know well time: 1300    Glucose time: 1700   Glucose result: 135    Physical Exam  Constitutional:       " General: She is awake. She is not in acute distress.  HENT:      Head: Normocephalic.      Nose: Nose normal.   Neck:      Comments: Pt complaining of head and neck hurting  Cardiovascular:      Rate and Rhythm: Normal rate and regular rhythm.   Pulmonary:      Effort: Pulmonary effort is normal. No respiratory distress.   Skin:     General: Skin is warm and dry.   Neurological:      Mental Status: She is alert.      GCS: GCS eye subscore is 4. GCS verbal subscore is 5. GCS motor subscore is 6.      Comments: Left leg weakness       Time Stroke Code initiated: 1609  Stroke team Arrival time: Called stroke team on the phone at 1619. Primary MD at bedside with CTH orders already placed. Plan made for stroke team to meet RRN in CT.  Stroke Code activation triggers: L leg weakness  Vascular Neurology provider you spoke with: Dr. Lubin    Time arrived at CT: 1620  Time CT completed: 1646    VAN positive or negative: negative    Thrombolytic decision: No  Thrombolytic bolus (mg and time): N/A  Thrombolytic infusion (mg and time): N/A  Thrombolytic end time: N/A    IR decision: No  IR arrival: N/A  IR end time: N/A     RECOMMENDATIONS    We recommend: Continue to monitor patient per primary team.    FOLLOW-UP/PLAN    Call the Rapid Response Nurse, Erlinda Do RN at [phone] for additional questions or concerns.    PHYSICIAN ESCALATION    Orders received and case discussed with Dr. Nixon and Dr. Lubin.     Disposition: Remain in room 15355.

## 2024-08-08 NOTE — PLAN OF CARE
Problem: Adult Inpatient Plan of Care  Goal: Plan of Care Review  Outcome: Not Progressing  Goal: Optimal Comfort and Wellbeing  Outcome: Not Progressing       Problem: Thrombolytic Therapy  Goal: Absence of Bleeding  Outcome: Progressing   Problem: Pain Acute  Goal: Optimal Pain Control and Function  Outcome: Not Progressing

## 2024-08-09 LAB
ALBUMIN SERPL BCP-MCNC: 3 G/DL (ref 3.5–5.2)
ALP SERPL-CCNC: 69 U/L (ref 55–135)
ALT SERPL W/O P-5'-P-CCNC: 26 U/L (ref 10–44)
ANION GAP SERPL CALC-SCNC: 8 MMOL/L (ref 8–16)
APTT PPP: 37.6 SEC (ref 21–32)
APTT PPP: 50.6 SEC (ref 21–32)
APTT PPP: 65.1 SEC (ref 21–32)
APTT PPP: 79.4 SEC (ref 21–32)
APTT PPP: 82.5 SEC (ref 21–32)
AST SERPL-CCNC: 48 U/L (ref 10–40)
BASOPHILS # BLD AUTO: 0.03 K/UL (ref 0–0.2)
BASOPHILS NFR BLD: 0.3 % (ref 0–1.9)
BILIRUB SERPL-MCNC: 0.5 MG/DL (ref 0.1–1)
BILIRUB UR QL STRIP: NEGATIVE
BUN SERPL-MCNC: 22 MG/DL (ref 6–20)
CALCIUM SERPL-MCNC: 9.1 MG/DL (ref 8.7–10.5)
CHLORIDE SERPL-SCNC: 103 MMOL/L (ref 95–110)
CLARITY UR REFRACT.AUTO: CLEAR
CO2 SERPL-SCNC: 29 MMOL/L (ref 23–29)
COLOR UR AUTO: YELLOW
CREAT SERPL-MCNC: 1.5 MG/DL (ref 0.5–1.4)
DIFFERENTIAL METHOD BLD: ABNORMAL
EOSINOPHIL # BLD AUTO: 0.3 K/UL (ref 0–0.5)
EOSINOPHIL NFR BLD: 3.6 % (ref 0–8)
ERYTHROCYTE [DISTWIDTH] IN BLOOD BY AUTOMATED COUNT: 20 % (ref 11.5–14.5)
EST. GFR  (NO RACE VARIABLE): 43.3 ML/MIN/1.73 M^2
GLUCOSE SERPL-MCNC: 104 MG/DL (ref 70–110)
GLUCOSE UR QL STRIP: NEGATIVE
HCT VFR BLD AUTO: 26.5 % (ref 37–48.5)
HGB BLD-MCNC: 8.8 G/DL (ref 12–16)
HGB FRACT BLD ELPH-IMP: NORMAL
HGB S MFR BLD ELPH: 56.4 %
HGB UR QL STRIP: NEGATIVE
IMM GRANULOCYTES # BLD AUTO: 0.03 K/UL (ref 0–0.04)
IMM GRANULOCYTES NFR BLD AUTO: 0.3 % (ref 0–0.5)
INR PPP: 1 (ref 0.8–1.2)
KETONES UR QL STRIP: NEGATIVE
LEUKOCYTE ESTERASE UR QL STRIP: NEGATIVE
LYMPHOCYTES # BLD AUTO: 2.9 K/UL (ref 1–4.8)
LYMPHOCYTES NFR BLD: 31.4 % (ref 18–48)
MAGNESIUM SERPL-MCNC: 1.8 MG/DL (ref 1.6–2.6)
MCH RBC QN AUTO: 25.7 PG (ref 27–31)
MCHC RBC AUTO-ENTMCNC: 33.2 G/DL (ref 32–36)
MCV RBC AUTO: 77 FL (ref 82–98)
MONOCYTES # BLD AUTO: 1.2 K/UL (ref 0.3–1)
MONOCYTES NFR BLD: 13.2 % (ref 4–15)
NEUTROPHILS # BLD AUTO: 4.7 K/UL (ref 1.8–7.7)
NEUTROPHILS NFR BLD: 51.2 % (ref 38–73)
NITRITE UR QL STRIP: NEGATIVE
NRBC BLD-RTO: 1 /100 WBC
PH UR STRIP: 7 [PH] (ref 5–8)
PHOSPHATE SERPL-MCNC: 4.2 MG/DL (ref 2.7–4.5)
PLATELET # BLD AUTO: 152 K/UL (ref 150–450)
PMV BLD AUTO: 10.9 FL (ref 9.2–12.9)
POTASSIUM SERPL-SCNC: 3.6 MMOL/L (ref 3.5–5.1)
PROT SERPL-MCNC: 6 G/DL (ref 6–8.4)
PROT UR QL STRIP: NEGATIVE
PROTHROMBIN TIME: 10.8 SEC (ref 9–12.5)
RBC # BLD AUTO: 3.43 M/UL (ref 4–5.4)
SODIUM SERPL-SCNC: 140 MMOL/L (ref 136–145)
SP GR UR STRIP: 1.02 (ref 1–1.03)
URN SPEC COLLECT METH UR: NORMAL
WBC # BLD AUTO: 9.11 K/UL (ref 3.9–12.7)

## 2024-08-09 PROCEDURE — 25000003 PHARM REV CODE 250: Performed by: INTERNAL MEDICINE

## 2024-08-09 PROCEDURE — 85730 THROMBOPLASTIN TIME PARTIAL: CPT | Mod: 91 | Performed by: INTERNAL MEDICINE

## 2024-08-09 PROCEDURE — 83735 ASSAY OF MAGNESIUM: CPT | Performed by: STUDENT IN AN ORGANIZED HEALTH CARE EDUCATION/TRAINING PROGRAM

## 2024-08-09 PROCEDURE — 80053 COMPREHEN METABOLIC PANEL: CPT | Performed by: STUDENT IN AN ORGANIZED HEALTH CARE EDUCATION/TRAINING PROGRAM

## 2024-08-09 PROCEDURE — 63600175 PHARM REV CODE 636 W HCPCS: Performed by: STUDENT IN AN ORGANIZED HEALTH CARE EDUCATION/TRAINING PROGRAM

## 2024-08-09 PROCEDURE — 25000003 PHARM REV CODE 250: Performed by: STUDENT IN AN ORGANIZED HEALTH CARE EDUCATION/TRAINING PROGRAM

## 2024-08-09 PROCEDURE — 85610 PROTHROMBIN TIME: CPT | Performed by: PHYSICIAN ASSISTANT

## 2024-08-09 PROCEDURE — 63600175 PHARM REV CODE 636 W HCPCS: Performed by: INTERNAL MEDICINE

## 2024-08-09 PROCEDURE — 85730 THROMBOPLASTIN TIME PARTIAL: CPT | Performed by: INTERNAL MEDICINE

## 2024-08-09 PROCEDURE — 36415 COLL VENOUS BLD VENIPUNCTURE: CPT | Mod: XB | Performed by: PHYSICIAN ASSISTANT

## 2024-08-09 PROCEDURE — 93010 ELECTROCARDIOGRAM REPORT: CPT | Mod: ,,, | Performed by: INTERNAL MEDICINE

## 2024-08-09 PROCEDURE — 51701 INSERT BLADDER CATHETER: CPT

## 2024-08-09 PROCEDURE — 84100 ASSAY OF PHOSPHORUS: CPT | Performed by: STUDENT IN AN ORGANIZED HEALTH CARE EDUCATION/TRAINING PROGRAM

## 2024-08-09 PROCEDURE — 81003 URINALYSIS AUTO W/O SCOPE: CPT | Performed by: PHYSICIAN ASSISTANT

## 2024-08-09 PROCEDURE — 85025 COMPLETE CBC W/AUTO DIFF WBC: CPT | Performed by: STUDENT IN AN ORGANIZED HEALTH CARE EDUCATION/TRAINING PROGRAM

## 2024-08-09 PROCEDURE — 36415 COLL VENOUS BLD VENIPUNCTURE: CPT | Mod: XB | Performed by: INTERNAL MEDICINE

## 2024-08-09 PROCEDURE — 85730 THROMBOPLASTIN TIME PARTIAL: CPT | Mod: 91 | Performed by: PHYSICIAN ASSISTANT

## 2024-08-09 PROCEDURE — 11000001 HC ACUTE MED/SURG PRIVATE ROOM

## 2024-08-09 PROCEDURE — 51798 US URINE CAPACITY MEASURE: CPT

## 2024-08-09 PROCEDURE — 93005 ELECTROCARDIOGRAM TRACING: CPT

## 2024-08-09 PROCEDURE — 25000003 PHARM REV CODE 250

## 2024-08-09 PROCEDURE — 63600175 PHARM REV CODE 636 W HCPCS: Performed by: PHYSICIAN ASSISTANT

## 2024-08-09 RX ORDER — HYDROMORPHONE HYDROCHLORIDE 2 MG/ML
2 INJECTION, SOLUTION INTRAMUSCULAR; INTRAVENOUS; SUBCUTANEOUS ONCE
Status: COMPLETED | OUTPATIENT
Start: 2024-08-09 | End: 2024-08-09

## 2024-08-09 RX ORDER — POTASSIUM CHLORIDE 20 MEQ/1
40 TABLET, EXTENDED RELEASE ORAL ONCE
Status: COMPLETED | OUTPATIENT
Start: 2024-08-09 | End: 2024-08-09

## 2024-08-09 RX ORDER — HEPARIN SODIUM,PORCINE/D5W 25000/250
0-40 INTRAVENOUS SOLUTION INTRAVENOUS CONTINUOUS
Status: DISCONTINUED | OUTPATIENT
Start: 2024-08-09 | End: 2024-08-10

## 2024-08-09 RX ORDER — TRAZODONE HYDROCHLORIDE 50 MG/1
50 TABLET ORAL NIGHTLY
Status: DISCONTINUED | OUTPATIENT
Start: 2024-08-09 | End: 2024-08-16 | Stop reason: HOSPADM

## 2024-08-09 RX ORDER — HEPARIN 100 UNIT/ML
100 SYRINGE INTRAVENOUS ONCE
Status: COMPLETED | OUTPATIENT
Start: 2024-08-09 | End: 2024-08-09

## 2024-08-09 RX ORDER — FUROSEMIDE 10 MG/ML
40 INJECTION INTRAMUSCULAR; INTRAVENOUS EVERY 12 HOURS
Status: DISCONTINUED | OUTPATIENT
Start: 2024-08-09 | End: 2024-08-11

## 2024-08-09 RX ORDER — HYDROMORPHONE HYDROCHLORIDE 1 MG/ML
1 INJECTION, SOLUTION INTRAMUSCULAR; INTRAVENOUS; SUBCUTANEOUS ONCE
Status: COMPLETED | OUTPATIENT
Start: 2024-08-09 | End: 2024-08-09

## 2024-08-09 RX ADMIN — HEPARIN SODIUM AND DEXTROSE 20 UNITS/KG/HR: 10000; 5 INJECTION INTRAVENOUS at 04:08

## 2024-08-09 RX ADMIN — FLUOXETINE HYDROCHLORIDE 40 MG: 20 CAPSULE ORAL at 08:08

## 2024-08-09 RX ADMIN — OXYCODONE HYDROCHLORIDE 20 MG: 10 TABLET ORAL at 06:08

## 2024-08-09 RX ADMIN — HYDROMORPHONE HYDROCHLORIDE 3 MG: 2 INJECTION INTRAMUSCULAR; INTRAVENOUS; SUBCUTANEOUS at 04:08

## 2024-08-09 RX ADMIN — GABAPENTIN 600 MG: 300 CAPSULE ORAL at 08:08

## 2024-08-09 RX ADMIN — TRAZODONE HYDROCHLORIDE 50 MG: 50 TABLET ORAL at 08:08

## 2024-08-09 RX ADMIN — PROCHLORPERAZINE EDISYLATE 2.5 MG: 5 INJECTION INTRAMUSCULAR; INTRAVENOUS at 12:08

## 2024-08-09 RX ADMIN — AMLODIPINE BESYLATE 10 MG: 10 TABLET ORAL at 08:08

## 2024-08-09 RX ADMIN — PROCHLORPERAZINE EDISYLATE 2.5 MG: 5 INJECTION INTRAMUSCULAR; INTRAVENOUS at 10:08

## 2024-08-09 RX ADMIN — MORPHINE SULFATE 15 MG: 15 TABLET, EXTENDED RELEASE ORAL at 10:08

## 2024-08-09 RX ADMIN — HEPARIN SODIUM AND DEXTROSE 20 UNITS/KG/HR: 10000; 5 INJECTION INTRAVENOUS at 01:08

## 2024-08-09 RX ADMIN — HYDROMORPHONE HYDROCHLORIDE 2 MG: 2 INJECTION INTRAMUSCULAR; INTRAVENOUS; SUBCUTANEOUS at 06:08

## 2024-08-09 RX ADMIN — HYDROMORPHONE HYDROCHLORIDE 3 MG: 2 INJECTION INTRAMUSCULAR; INTRAVENOUS; SUBCUTANEOUS at 06:08

## 2024-08-09 RX ADMIN — HEPARIN SODIUM AND DEXTROSE 17 UNITS/KG/HR: 10000; 5 INJECTION INTRAVENOUS at 06:08

## 2024-08-09 RX ADMIN — HYDROMORPHONE HYDROCHLORIDE 3 MG: 2 INJECTION INTRAMUSCULAR; INTRAVENOUS; SUBCUTANEOUS at 02:08

## 2024-08-09 RX ADMIN — HYDROXYZINE HYDROCHLORIDE 25 MG: 25 TABLET ORAL at 09:08

## 2024-08-09 RX ADMIN — SENNOSIDES 8.6 MG: 8.6 TABLET, FILM COATED ORAL at 08:08

## 2024-08-09 RX ADMIN — HYDROMORPHONE HYDROCHLORIDE 1 MG: 1 INJECTION, SOLUTION INTRAMUSCULAR; INTRAVENOUS; SUBCUTANEOUS at 10:08

## 2024-08-09 RX ADMIN — LACTULOSE 20 G: 20 SOLUTION ORAL at 10:08

## 2024-08-09 RX ADMIN — ALTEPLASE 2 MG: 2.2 INJECTION, POWDER, LYOPHILIZED, FOR SOLUTION INTRAVENOUS at 11:08

## 2024-08-09 RX ADMIN — FUROSEMIDE 40 MG: 10 INJECTION, SOLUTION INTRAVENOUS at 09:08

## 2024-08-09 RX ADMIN — CARVEDILOL 25 MG: 25 TABLET, FILM COATED ORAL at 08:08

## 2024-08-09 RX ADMIN — HYDROMORPHONE HYDROCHLORIDE 2 MG: 2 INJECTION INTRAMUSCULAR; INTRAVENOUS; SUBCUTANEOUS at 12:08

## 2024-08-09 RX ADMIN — HEPARIN 100 UNITS: 100 SYRINGE at 04:08

## 2024-08-09 RX ADMIN — OXYCODONE HYDROCHLORIDE 20 MG: 10 TABLET ORAL at 01:08

## 2024-08-09 RX ADMIN — CARVEDILOL 25 MG: 25 TABLET, FILM COATED ORAL at 09:08

## 2024-08-09 RX ADMIN — MORPHINE SULFATE 15 MG: 15 TABLET, EXTENDED RELEASE ORAL at 06:08

## 2024-08-09 RX ADMIN — OXYCODONE HYDROCHLORIDE 20 MG: 10 TABLET ORAL at 03:08

## 2024-08-09 RX ADMIN — FUROSEMIDE 40 MG: 10 INJECTION, SOLUTION INTRAVENOUS at 08:08

## 2024-08-09 RX ADMIN — HEPARIN SODIUM AND DEXTROSE 17 UNITS/KG/HR: 10000; 5 INJECTION INTRAVENOUS at 09:08

## 2024-08-09 RX ADMIN — TIZANIDINE 4 MG: 2 TABLET ORAL at 08:08

## 2024-08-09 RX ADMIN — POTASSIUM CHLORIDE 40 MEQ: 1500 TABLET, EXTENDED RELEASE ORAL at 08:08

## 2024-08-09 RX ADMIN — OXYCODONE HYDROCHLORIDE 20 MG: 10 TABLET ORAL at 08:08

## 2024-08-09 RX ADMIN — PROMETHAZINE HYDROCHLORIDE 25 MG: 12.5 TABLET ORAL at 01:08

## 2024-08-09 RX ADMIN — FOLIC ACID 1 MG: 1 TABLET ORAL at 08:08

## 2024-08-09 NOTE — NURSING
PTT resulted 82.5, per nomogram holding infusion x 1 hr, will decrease rate to 17u/kg/hr, recheck PTT at 1500. Patient updated on POC.

## 2024-08-09 NOTE — PLAN OF CARE
Problem: Adult Inpatient Plan of Care  Goal: Plan of Care Review  Outcome: Progressing  Goal: Optimal Comfort and Wellbeing  Outcome: Progressing     Problem: Acute Kidney Injury/Impairment  Goal: Improved Oral Intake  Outcome: Progressing     Problem: Infection  Goal: Absence of Infection Signs and Symptoms  Outcome: Progressing     Problem: Pain Acute  Goal: Optimal Pain Control and Function  Outcome: Progressing     Problem: Adult Inpatient Plan of Care  Goal: Patient-Specific Goal (Individualized)  Outcome: Not Progressing  Goal: Readiness for Transition of Care  Outcome: Not Progressing

## 2024-08-09 NOTE — NURSING
Patient complaining of pain 12/10, this nurse assessed patient at bedside, patient moaning and groggy, talking and drifting back to sleep. She is demanding dilaudid, educated patient that she must try oxy first and dilaudid is for breakthrough. Patient understood. This nurse came back with the oxy, patient unable to keep eyes open to take it. Drifted back to sleep. Oxy was held. Safety measures in place. Vitals obtained and all in normal range at this time. Bed low and locked, call light in reach. MD and charge nurse updated on above.

## 2024-08-09 NOTE — NURSING
Heparin order indavertantly dc/d by nursing while trying to put in ptt check for 6 hrs. Placed back in by overnight on call Niall. Pt weight adjusted per MAR order for high intensity since pt had new weight in chart. Spoke with pharmacy to make sure dosing weight was what it needed to be based on that new weight that was charted. Ok to start on new order at same 20u. Pt has a ptt to be drawn in 3 hrs. Checks done by me and Primary nurse.

## 2024-08-09 NOTE — NURSING
Assisted pt from the bathroom to the stretcher for transport to MRI. Pt reports feeling weakness to her left lower extremity. Pt also reports that she feels the urge to void but cannot empty her bladder.     Prior to ambulating the the restroom independently she had an external urinary catheter in place and 30 mL urine noted in canister.     Pt transported to [facility].

## 2024-08-09 NOTE — NURSING
Patient c/o left port that was just accessed painful when flushing saline through. Patient states that she asked RN who accessed to take the port out because it was painful. Concerns expressed to MD and general surgeon MD. Order to remove left chest wall port. Port deaccessed with no complications. General surgeon stated to stop heparin drip at 0200 and they will plan for port removal in am.  MD Dr. Nixon aware. New orders placed for heparin drip to d/c at 0200. Will pass on in report.

## 2024-08-09 NOTE — PROGRESS NOTES
[facility] ([address] Medicine  Progress Note    Patient Name: Nazanin Malone  MRN: [id number]  Patient Class: IP- Inpatient   Admission Date: 7/28/2024  Length of Stay: 12 days  Attending Physician: Aftab Nixon MD  Primary Care Provider: Pee Montgomery MD        Subjective:     Principal Problem:Sickle cell pain crisis        HPI:  Nazanin Malone is a 44 year old female with history of SC anemia with history of acute chest, beta thalassemia, HTN, obesity, iron deficiency anemia, hx of PE on apixaban, RLE fasciotomy, and recent hospitalization for bacteremia s/p antibiotic course and recent sickle cell pain crisis that presents with sickle cell pain crisis. Patient reports recurrent pain consistent with her prior sickle cell pain crisis. Her pain started 2-3 days ago which started mostly in her left chest. Pain was constant without relief despite taking her home opiate regimen. Yesterday, she noted pain in her left arm and some shortness of breath. She reports a cough with yellow sputum and felt like she was oscillating between feeling hot and cold as well as low appetite associated with nausea. Denies F/C, abdominal pain, constipation.    Of note, had recent hospitalization for staph epidermis bacteremia s/p 14 day course of IV vancomycin.    Upon arrival to the ED, patient afebrile, HDS, and saturating well on room air. Labs notable for WBC 9, Hgb 11.5 (higher than baseline), K 3.2, trop negative, BNP WNL, blood cultures x2 sent. EKG without ischemic changes. CXR without consolidation.     Overview/Hospital Course:  Patient admitted for sickle cell pain crisis. Infectious work up negative. Started on IV pain medication PRN and IVF. US of left cath/chest obtained due to superficial pain around port. US without fluid collection or mass and pain believed to be secondary to sickle cell pain crisis.  Patient with continued in the left chest and upper extremity.  Left arm with  partial DVT in left IJ.  Anticoagulation switch from apixaban to heparin drip.  IV fluids discontinued given concern for swelling and diuresed given edema.  Bilateral lower extremity ultrasound and CT chest negative for PE/DVT.  Hematology consulted, recommended extended transfusion like she has undergone previously during her sickle cell care in [place] prior to moving to [place].    Transfusion medicine consulted. Plan for exchange transfusion with 6u pRBC 8/8. General surgery consulted for left port removal; since port flushes and acute thrombosis is not an indication for port removal, general surgery signed off.    Patient with new acute left lower extremity weakness on 8/8. Stroke code called and CTA and brain MRI unremarkable. Patient had complete recovery and ambulating well. Unclear etiology but concern that patient was getting too much anti-histamines which were subsequently discontinued.    Interval History: Pt seen and examined this morning on thea KINGSLEY. General surgery consulted for left port removal; since port flushes and acute thrombosis is not an indication for port removal, general surgery deferred port removal. CTA and brain MRI unremarkable. Patient had complete recovery and ambulating well. Unclear etiology but concern that patient was getting too much anti-histamines which were subsequently discontinued.        Objective:     Vital Signs (Most Recent):  Temp: 97.8 °F (36.6 °C) (08/09/24 1155)  Pulse: (!) 55 (08/09/24 1155)  Resp: 15 (08/09/24 1155)  BP: 106/64 (08/09/24 1155)  SpO2: 99 % (08/09/24 1155) Vital Signs (24h Range):  Temp:  [97.8 °F (36.6 °C)-99.2 °F (37.3 °C)] 97.8 °F (36.6 °C)  Pulse:  [55-92] 55  Resp:  [14-18] 15  SpO2:  [95 %-99 %] 99 %  BP: (101-135)/(62-86) 106/64     Weight: 109.7 kg (241 lb 13.5 oz)  Body mass index is 44.23 kg/m².    Intake/Output Summary (Last 24 hours) at 8/9/2024 1350  Last data filed at 8/9/2024 0624  Gross per 24 hour   Intake 860 ml   Output  1580 ml   Net -720 ml         Physical Exam  Gen: in NAD, appears stated age, obese  Neuro: AAOx4, CN2-12 grossly intact BL; motor, sensory, and strength grossly intact BL  HEENT: NTNC, EOMI, PERRLA, MMM; no thyromegaly or lymphadenopathy; no JVD appreciated  CVS: Left and Right chest port sites. RRR, no m/r/g; S1/S2 auscultated with no S3 or S4; capillary refill < 2 sec  Resp: lungs CTAB, no w/r/r; no belabored breathing or accessory muscle use appreciated   Abd: BS+ in all 4 quadrants; NTND, soft to palpation; no organomegaly appreciated   Extrem: LUE swelling. pulses full, equal, and regular over all 4 extremities; no UE or LE edema BL          Significant Labs: All pertinent labs within the past 24 hours have been reviewed.    Significant Imaging: I have reviewed all pertinent imaging results/findings within the past 24 hours.    Assessment/Plan:      * Sickle cell pain crisis  Hb-SS disease with vaso-occlusive pain  Patient recently moved from [place] to [place] in June 2024. Patient reported receiving RBC exchanges every 6 week via two ports in her chest for management of her sickle cell disease. Has yet to establish care in [place].     - Hgb 11.6 on admit, at baseline  - Reticulocyte count 0.7  - Continue MS contin TID  and tizanidine PRN  - Gabapentin, APAP,  - Oxycodone 15mg PRN  - Dilaudid PRN; wean as able  - s/p IVF to prevent worsening sickling  - Hematology & transfusion medicine consulted  -- S/p exchange transfusion w/ assistance of transfusion medicine  -- General Surgery consulted - plan to remove left chest port. Hep gtt held.    Thrombosis of internal carotid, left  - US Left upper extremity 8/3 with partial deep vein thrombosis of the left internal jugular vein by the port tip   - BLE Dupplex ordered  - Discussed DVT while on Eliquis 5mg BID, and is considered treatment failure  - Transitioned to heparin drip  - CTA Chest without PE  - Hematology consulted  -- Continue hepatin drip  -  General surgery consulted for left port removal; since port flushes and acute thrombosis is not an indication for port removal, general surgery signed off.  -- IV heparin while IP and transition to treatment dose lovenox for discharge    FLOR (acute kidney injury)  - Cr elevated to 1.5, baseline 1.0  - Likely 2/2 poor PO intake in setting of sickle cell crisis  - Improved with IVF  - Renally dosing all medications  - Avoiding nephrotoxins  - Will continue to monitor on daily labs  - Improved    Left-sided weakness  - Resolved; likely 2/2 too much anti-histamines (atarax/benadryl)  - CTA/Brain MRI unremarkable    - Resolved      Hypokalemia  Patient's most recent potassium results are listed below.   Recent Labs     08/04/24  0432 08/05/24  0210 08/06/24  0520   K 3.9 4.0 3.9       Plan  - Replete potassium per protocol  - Monitor potassium Daily  - Patient's hypokalemia is stable    History of pulmonary embolism  - hx noted  - continue home Eliquis     Hypertension  Chronic, controlled. Latest blood pressure and vitals reviewed-     Temp:  [99.1 °F (37.3 °C)]   Pulse:  [86-92]   Resp:  [20]   BP: (121-157)/(82-94)   SpO2:  [97 %-100 %] .   Home meds for hypertension were reviewed and noted below.   Hypertension Medications               amLODIPine (NORVASC) 10 MG tablet Take 1 tablet (10 mg total) by mouth once daily.    carvediloL (COREG) 25 MG tablet Take 1 tablet (25 mg total) by mouth 2 (two) times daily.            While in the hospital, will manage blood pressure as follows; Continue home antihypertensive regimen    Will utilize p.r.n. blood pressure medication only if patient's blood pressure greater than 180/110 and she develops symptoms such as worsening chest pain or shortness of breath.      VTE Risk Mitigation (From admission, onward)           Ordered     heparin 25,000 units in dextrose 5% (100 units/ml) IV bolus from bag HIGH INTENSITY nomogram - [facility]  As needed (PRN)        Question:  Heparin Infusion  Adjustment (DO NOT MODIFY ANSWER)  Answer:  [email or web address]\epic\Images\Pharmacy\HeparinInfusions\heparin HIGH INTENSITY nomogram for [facility] [id number]    08/09/24 0358     heparin 25,000 units in dextrose 5% (100 units/ml) IV bolus from bag HIGH INTENSITY nomogram - [facility]  As needed (PRN)        Question:  Heparin Infusion Adjustment (DO NOT MODIFY ANSWER)  Answer:  [email or web address]\epic\Images\Pharmacy\HeparinInfusions\heparin HIGH INTENSITY nomogram for [facility] [id number]    08/09/24 0358     heparin 25,000 units in dextrose 5% 250 mL (100 units/mL) infusion HIGH INTENSITY nomogram - [facility]  Continuous        Question:  Begin at (units/kg/hr)  Answer:  18    08/09/24 0358     Reason for No Pharmacological VTE Prophylaxis  Once        Question:  Reasons:  Answer:  Already adequately anticoagulated on oral Anticoagulants    07/28/24 1117     IP VTE HIGH RISK PATIENT  Once         07/28/24 1117                    Discharge Planning   ALYSE: 8/13/2024     Code Status: Full Code   Is the patient medically ready for discharge?:     Reason for patient still in hospital (select all that apply): Treatment  Discharge Plan A: Home   Discharge Delays: None known at this time              Aftab Nixon MD  Department of Hospital Medicine   [facility] ([place])

## 2024-08-09 NOTE — PLAN OF CARE
Problem: Adult Inpatient Plan of Care  Goal: Plan of Care Review  Outcome: Progressing  Goal: Patient-Specific Goal (Individualized)  Outcome: Progressing  Goal: Absence of Hospital-Acquired Illness or Injury  Outcome: Progressing  Goal: Optimal Comfort and Wellbeing  Outcome: Progressing  Goal: Readiness for Transition of Care  Outcome: Progressing     Problem: Infection  Goal: Absence of Infection Signs and Symptoms  Outcome: Progressing     Problem: Acute Kidney Injury/Impairment  Goal: Fluid and Electrolyte Balance  Outcome: Progressing  Goal: Improved Oral Intake  Outcome: Progressing  Goal: Effective Renal Function  Outcome: Progressing     Problem: Pain Acute  Goal: Optimal Pain Control and Function  Outcome: Progressing     Problem: Fall Injury Risk  Goal: Absence of Fall and Fall-Related Injury  Outcome: Progressing     Problem: Sickle Cell Disease  Goal: Optimal Coping with Sickle Cell Disease  Outcome: Progressing  Goal: Effective Tissue Perfusion  Outcome: Progressing  Goal: Absence of Infection Signs and Symptoms  Outcome: Progressing  Goal: Optimal Pain Control and Function  Outcome: Progressing  Goal: Optimal Oxygenation  Outcome: Progressing     Problem: Thrombolytic Therapy  Goal: Absence of Bleeding  Outcome: Progressing  Goal: Unobstructed Breathing  Outcome: Progressing     Problem: Bariatric Environmental Safety  Goal: Safety Maintained with Care  Outcome: Progressing     Problem: Skin Injury Risk Increased  Goal: Skin Health and Integrity  Outcome: Progressing

## 2024-08-09 NOTE — ASSESSMENT & PLAN NOTE
- Resolved; likely 2/2 too much anti-histamines (atarax/benadryl)  - CTA/Brain MRI unremarkable    - Resolved

## 2024-08-09 NOTE — ASSESSMENT & PLAN NOTE
- US Left upper extremity 8/3 with partial deep vein thrombosis of the left internal jugular vein by the port tip   - BLE Dupplex ordered  - Discussed DVT while on Eliquis 5mg BID, and is considered treatment failure  - Transitioned to heparin drip  - CTA Chest without PE  - Hematology consulted  -- Continue hepatin drip  - General surgery consulted for left port removal; since port flushes and acute thrombosis is not an indication for port removal, general surgery signed off.  -- IV heparin while IP and transition to treatment dose lovenox for discharge

## 2024-08-09 NOTE — SUBJECTIVE & OBJECTIVE
Interval History: Pt seen and examined this morning on thea KINGSLEY. General surgery consulted for left port removal; since port flushes and acute thrombosis is not an indication for port removal, general surgery deferred port removal. CTA and brain MRI unremarkable. Patient had complete recovery and ambulating well. Unclear etiology but concern that patient was getting too much anti-histamines which were subsequently discontinued.        Objective:     Vital Signs (Most Recent):  Temp: 97.8 °F (36.6 °C) (08/09/24 1155)  Pulse: (!) 55 (08/09/24 1155)  Resp: 15 (08/09/24 1155)  BP: 106/64 (08/09/24 1155)  SpO2: 99 % (08/09/24 1155) Vital Signs (24h Range):  Temp:  [97.8 °F (36.6 °C)-99.2 °F (37.3 °C)] 97.8 °F (36.6 °C)  Pulse:  [55-92] 55  Resp:  [14-18] 15  SpO2:  [95 %-99 %] 99 %  BP: (101-135)/(62-86) 106/64     Weight: 109.7 kg (241 lb 13.5 oz)  Body mass index is 44.23 kg/m².    Intake/Output Summary (Last 24 hours) at 8/9/2024 1350  Last data filed at 8/9/2024 0624  Gross per 24 hour   Intake 860 ml   Output 1580 ml   Net -720 ml         Physical Exam  Gen: in NAD, appears stated age, obese  Neuro: AAOx4, CN2-12 grossly intact BL; motor, sensory, and strength grossly intact BL  HEENT: NTNC, EOMI, PERRLA, MMM; no thyromegaly or lymphadenopathy; no JVD appreciated  CVS: Left and Right chest port sites. RRR, no m/r/g; S1/S2 auscultated with no S3 or S4; capillary refill < 2 sec  Resp: lungs CTAB, no w/r/r; no belabored breathing or accessory muscle use appreciated   Abd: BS+ in all 4 quadrants; NTND, soft to palpation; no organomegaly appreciated   Extrem: LUE swelling. pulses full, equal, and regular over all 4 extremities; no UE or LE edema BL          Significant Labs: All pertinent labs within the past 24 hours have been reviewed.    Significant Imaging: I have reviewed all pertinent imaging results/findings within the past 24 hours.

## 2024-08-09 NOTE — NURSING
Pt ambulated independently to the bathroom without difficulty. States she feels much better this morning. Plan of care ongoing.

## 2024-08-09 NOTE — NURSING
Pt reports nausea despite receiving 2.5 mg of compazine at 0022.    Of note patient has received and consumed the following since her dinner: 2 turkey sandwiches, 4 jellos, 6 apple sauces, and crackers with peanut butter.    Emesis bag provided. See MAR.

## 2024-08-09 NOTE — CONSULTS
General Surgery Care Update     Consult for port removal 2/2 partial deep vein thrombosis of the left internal jugular vein by the port tip. Acute thrombosis is not an indication for port removal. Port flushes easily.     Recommend against removal of port at this time. She does need anticoagulation and is on it for DVTs.     Please call with new questions or concerns.    Leah Garcia MD  PGY-5, General Surgery  [facility]

## 2024-08-09 NOTE — NURSING
Pt returned from MRI and made a second attempt to void however was unsuccessful. Bladder scan resulted at 859 mL. Straight cath performed with final amount 1200 mL out.    Pt states she is in 9/10 pain in her lower extremities. Reached out to Med Team B via secure chat per her request regarding more pain medication.

## 2024-08-10 LAB
ALBUMIN SERPL BCP-MCNC: 3 G/DL (ref 3.5–5.2)
ALP SERPL-CCNC: 69 U/L (ref 55–135)
ALT SERPL W/O P-5'-P-CCNC: 60 U/L (ref 10–44)
ANION GAP SERPL CALC-SCNC: 7 MMOL/L (ref 8–16)
APTT PPP: 23.1 SEC (ref 21–32)
APTT PPP: 26.7 SEC (ref 21–32)
APTT PPP: 74.2 SEC (ref 21–32)
AST SERPL-CCNC: 115 U/L (ref 10–40)
BASOPHILS # BLD AUTO: 0.03 K/UL (ref 0–0.2)
BASOPHILS NFR BLD: 0.4 % (ref 0–1.9)
BILIRUB SERPL-MCNC: 0.4 MG/DL (ref 0.1–1)
BUN SERPL-MCNC: 15 MG/DL (ref 6–20)
CALCIUM SERPL-MCNC: 9.2 MG/DL (ref 8.7–10.5)
CHLORIDE SERPL-SCNC: 102 MMOL/L (ref 95–110)
CO2 SERPL-SCNC: 32 MMOL/L (ref 23–29)
CREAT SERPL-MCNC: 1.4 MG/DL (ref 0.5–1.4)
DIFFERENTIAL METHOD BLD: ABNORMAL
EOSINOPHIL # BLD AUTO: 0.3 K/UL (ref 0–0.5)
EOSINOPHIL NFR BLD: 4.6 % (ref 0–8)
ERYTHROCYTE [DISTWIDTH] IN BLOOD BY AUTOMATED COUNT: 21.4 % (ref 11.5–14.5)
EST. GFR  (NO RACE VARIABLE): 47 ML/MIN/1.73 M^2
GLUCOSE SERPL-MCNC: 84 MG/DL (ref 70–110)
HCT VFR BLD AUTO: 27 % (ref 37–48.5)
HGB BLD-MCNC: 8.8 G/DL (ref 12–16)
IMM GRANULOCYTES # BLD AUTO: 0.01 K/UL (ref 0–0.04)
IMM GRANULOCYTES NFR BLD AUTO: 0.1 % (ref 0–0.5)
INR PPP: 1 (ref 0.8–1.2)
LYMPHOCYTES # BLD AUTO: 2.7 K/UL (ref 1–4.8)
LYMPHOCYTES NFR BLD: 37.9 % (ref 18–48)
MAGNESIUM SERPL-MCNC: 1.9 MG/DL (ref 1.6–2.6)
MCH RBC QN AUTO: 25.6 PG (ref 27–31)
MCHC RBC AUTO-ENTMCNC: 32.6 G/DL (ref 32–36)
MCV RBC AUTO: 79 FL (ref 82–98)
MONOCYTES # BLD AUTO: 1.1 K/UL (ref 0.3–1)
MONOCYTES NFR BLD: 16.1 % (ref 4–15)
NEUTROPHILS # BLD AUTO: 2.9 K/UL (ref 1.8–7.7)
NEUTROPHILS NFR BLD: 40.9 % (ref 38–73)
NRBC BLD-RTO: 1 /100 WBC
OHS QRS DURATION: 98 MS
OHS QTC CALCULATION: 484 MS
PHOSPHATE SERPL-MCNC: 4.2 MG/DL (ref 2.7–4.5)
PLATELET # BLD AUTO: 154 K/UL (ref 150–450)
PMV BLD AUTO: 10.8 FL (ref 9.2–12.9)
POTASSIUM SERPL-SCNC: 3.8 MMOL/L (ref 3.5–5.1)
PROT SERPL-MCNC: 6 G/DL (ref 6–8.4)
PROTHROMBIN TIME: 10.5 SEC (ref 9–12.5)
RBC # BLD AUTO: 3.44 M/UL (ref 4–5.4)
SODIUM SERPL-SCNC: 141 MMOL/L (ref 136–145)
WBC # BLD AUTO: 7 K/UL (ref 3.9–12.7)

## 2024-08-10 PROCEDURE — 25000003 PHARM REV CODE 250: Performed by: STUDENT IN AN ORGANIZED HEALTH CARE EDUCATION/TRAINING PROGRAM

## 2024-08-10 PROCEDURE — 02PY03Z REMOVAL OF INFUSION DEVICE FROM GREAT VESSEL, OPEN APPROACH: ICD-10-PCS | Performed by: SURGERY

## 2024-08-10 PROCEDURE — 80053 COMPREHEN METABOLIC PANEL: CPT | Performed by: STUDENT IN AN ORGANIZED HEALTH CARE EDUCATION/TRAINING PROGRAM

## 2024-08-10 PROCEDURE — 83735 ASSAY OF MAGNESIUM: CPT | Performed by: STUDENT IN AN ORGANIZED HEALTH CARE EDUCATION/TRAINING PROGRAM

## 2024-08-10 PROCEDURE — 63600175 PHARM REV CODE 636 W HCPCS: Performed by: INTERNAL MEDICINE

## 2024-08-10 PROCEDURE — 25000003 PHARM REV CODE 250

## 2024-08-10 PROCEDURE — 36415 COLL VENOUS BLD VENIPUNCTURE: CPT | Mod: XB | Performed by: INTERNAL MEDICINE

## 2024-08-10 PROCEDURE — 85610 PROTHROMBIN TIME: CPT | Performed by: INTERNAL MEDICINE

## 2024-08-10 PROCEDURE — 85730 THROMBOPLASTIN TIME PARTIAL: CPT | Mod: 91 | Performed by: PHYSICIAN ASSISTANT

## 2024-08-10 PROCEDURE — 25000003 PHARM REV CODE 250: Performed by: INTERNAL MEDICINE

## 2024-08-10 PROCEDURE — 85730 THROMBOPLASTIN TIME PARTIAL: CPT | Mod: 91 | Performed by: INTERNAL MEDICINE

## 2024-08-10 PROCEDURE — 11000001 HC ACUTE MED/SURG PRIVATE ROOM

## 2024-08-10 PROCEDURE — 36415 COLL VENOUS BLD VENIPUNCTURE: CPT | Performed by: PHYSICIAN ASSISTANT

## 2024-08-10 PROCEDURE — 84100 ASSAY OF PHOSPHORUS: CPT | Performed by: STUDENT IN AN ORGANIZED HEALTH CARE EDUCATION/TRAINING PROGRAM

## 2024-08-10 PROCEDURE — 85025 COMPLETE CBC W/AUTO DIFF WBC: CPT | Performed by: STUDENT IN AN ORGANIZED HEALTH CARE EDUCATION/TRAINING PROGRAM

## 2024-08-10 RX ORDER — HYDROMORPHONE HYDROCHLORIDE 2 MG/ML
2 INJECTION, SOLUTION INTRAMUSCULAR; INTRAVENOUS; SUBCUTANEOUS ONCE
Status: COMPLETED | OUTPATIENT
Start: 2024-08-10 | End: 2024-08-10

## 2024-08-10 RX ORDER — HEPARIN SODIUM,PORCINE/D5W 25000/250
0-40 INTRAVENOUS SOLUTION INTRAVENOUS CONTINUOUS
Status: DISCONTINUED | OUTPATIENT
Start: 2024-08-10 | End: 2024-08-10

## 2024-08-10 RX ORDER — HYDROMORPHONE HYDROCHLORIDE 2 MG/ML
3 INJECTION, SOLUTION INTRAMUSCULAR; INTRAVENOUS; SUBCUTANEOUS EVERY 4 HOURS PRN
Status: DISCONTINUED | OUTPATIENT
Start: 2024-08-10 | End: 2024-08-12

## 2024-08-10 RX ORDER — DIPHENHYDRAMINE HYDROCHLORIDE 50 MG/ML
25 INJECTION INTRAMUSCULAR; INTRAVENOUS ONCE
Status: COMPLETED | OUTPATIENT
Start: 2024-08-10 | End: 2024-08-10

## 2024-08-10 RX ORDER — HEPARIN SODIUM,PORCINE/D5W 25000/250
0-40 INTRAVENOUS SOLUTION INTRAVENOUS CONTINUOUS
Status: DISCONTINUED | OUTPATIENT
Start: 2024-08-10 | End: 2024-08-16 | Stop reason: HOSPADM

## 2024-08-10 RX ORDER — HYDROMORPHONE HYDROCHLORIDE 2 MG/ML
2 INJECTION, SOLUTION INTRAMUSCULAR; INTRAVENOUS; SUBCUTANEOUS EVERY 4 HOURS PRN
Status: DISCONTINUED | OUTPATIENT
Start: 2024-08-10 | End: 2024-08-10

## 2024-08-10 RX ORDER — HYDROMORPHONE HYDROCHLORIDE 1 MG/ML
1 INJECTION, SOLUTION INTRAMUSCULAR; INTRAVENOUS; SUBCUTANEOUS ONCE
Status: COMPLETED | OUTPATIENT
Start: 2024-08-10 | End: 2024-08-10

## 2024-08-10 RX ORDER — DIPHENHYDRAMINE HYDROCHLORIDE 50 MG/ML
50 INJECTION INTRAMUSCULAR; INTRAVENOUS EVERY 6 HOURS PRN
Status: COMPLETED | OUTPATIENT
Start: 2024-08-10 | End: 2024-08-11

## 2024-08-10 RX ADMIN — HYDROMORPHONE HYDROCHLORIDE 2 MG: 2 INJECTION INTRAMUSCULAR; INTRAVENOUS; SUBCUTANEOUS at 08:08

## 2024-08-10 RX ADMIN — ACETAMINOPHEN 1000 MG: 325 TABLET ORAL at 10:08

## 2024-08-10 RX ADMIN — HYDROMORPHONE HYDROCHLORIDE 3 MG: 2 INJECTION INTRAMUSCULAR; INTRAVENOUS; SUBCUTANEOUS at 01:08

## 2024-08-10 RX ADMIN — MORPHINE SULFATE 15 MG: 15 TABLET, EXTENDED RELEASE ORAL at 06:08

## 2024-08-10 RX ADMIN — HYDROMORPHONE HYDROCHLORIDE 3 MG: 2 INJECTION INTRAMUSCULAR; INTRAVENOUS; SUBCUTANEOUS at 09:08

## 2024-08-10 RX ADMIN — FLUOXETINE HYDROCHLORIDE 40 MG: 20 CAPSULE ORAL at 10:08

## 2024-08-10 RX ADMIN — DIPHENHYDRAMINE HYDROCHLORIDE 25 MG: 50 INJECTION, SOLUTION INTRAMUSCULAR; INTRAVENOUS at 11:08

## 2024-08-10 RX ADMIN — OXYCODONE HYDROCHLORIDE 20 MG: 10 TABLET ORAL at 04:08

## 2024-08-10 RX ADMIN — HYDROMORPHONE HYDROCHLORIDE 2 MG: 2 INJECTION INTRAMUSCULAR; INTRAVENOUS; SUBCUTANEOUS at 11:08

## 2024-08-10 RX ADMIN — HYDROMORPHONE HYDROCHLORIDE 3 MG: 2 INJECTION INTRAMUSCULAR; INTRAVENOUS; SUBCUTANEOUS at 05:08

## 2024-08-10 RX ADMIN — FUROSEMIDE 40 MG: 10 INJECTION, SOLUTION INTRAVENOUS at 09:08

## 2024-08-10 RX ADMIN — Medication 6 MG: at 09:08

## 2024-08-10 RX ADMIN — FOLIC ACID 1 MG: 1 TABLET ORAL at 10:08

## 2024-08-10 RX ADMIN — OXYCODONE HYDROCHLORIDE 20 MG: 10 TABLET ORAL at 09:08

## 2024-08-10 RX ADMIN — AMLODIPINE BESYLATE 10 MG: 10 TABLET ORAL at 10:08

## 2024-08-10 RX ADMIN — OXYCODONE HYDROCHLORIDE 20 MG: 10 TABLET ORAL at 10:08

## 2024-08-10 RX ADMIN — TRAZODONE HYDROCHLORIDE 50 MG: 50 TABLET ORAL at 09:08

## 2024-08-10 RX ADMIN — FUROSEMIDE 40 MG: 10 INJECTION, SOLUTION INTRAVENOUS at 10:08

## 2024-08-10 RX ADMIN — MORPHINE SULFATE 15 MG: 15 TABLET, EXTENDED RELEASE ORAL at 09:08

## 2024-08-10 RX ADMIN — DIPHENHYDRAMINE HYDROCHLORIDE 50 MG: 50 INJECTION INTRAMUSCULAR; INTRAVENOUS at 09:08

## 2024-08-10 RX ADMIN — GABAPENTIN 600 MG: 300 CAPSULE ORAL at 09:08

## 2024-08-10 RX ADMIN — ACETAMINOPHEN 1000 MG: 325 TABLET ORAL at 09:08

## 2024-08-10 RX ADMIN — HYDROMORPHONE HYDROCHLORIDE 1 MG: 1 INJECTION, SOLUTION INTRAMUSCULAR; INTRAVENOUS; SUBCUTANEOUS at 02:08

## 2024-08-10 RX ADMIN — TIZANIDINE 4 MG: 2 TABLET ORAL at 09:08

## 2024-08-10 RX ADMIN — OXYCODONE HYDROCHLORIDE 20 MG: 10 TABLET ORAL at 12:08

## 2024-08-10 RX ADMIN — SENNOSIDES 8.6 MG: 8.6 TABLET, FILM COATED ORAL at 10:08

## 2024-08-10 RX ADMIN — CARVEDILOL 25 MG: 25 TABLET, FILM COATED ORAL at 10:08

## 2024-08-10 RX ADMIN — HEPARIN SODIUM 18 UNITS/KG/HR: 10000 INJECTION, SOLUTION INTRAVENOUS at 12:08

## 2024-08-10 RX ADMIN — TIZANIDINE 4 MG: 2 TABLET ORAL at 12:08

## 2024-08-10 NOTE — SUBJECTIVE & OBJECTIVE
Interval History: Pt seen and examined this morning on thea KINGSLEY. PRN dilaudid dosing fell off overnight and patient receive spot dosing from night team. General surgery removed left port this AM due to clot/pain. IV benadryl Dc'd given side effects.        Objective:     Vital Signs (Most Recent):  Temp: 99.6 °F (37.6 °C) (08/10/24 0352)  Pulse: 86 (08/10/24 0352)  Resp: 17 (08/10/24 1017)  BP: 119/76 (08/10/24 0352)  SpO2: 95 % (08/10/24 0352) Vital Signs (24h Range):  Temp:  [97.6 °F (36.4 °C)-99.6 °F (37.6 °C)] 99.6 °F (37.6 °C)  Pulse:  [55-89] 86  Resp:  [15-19] 17  SpO2:  [94 %-99 %] 95 %  BP: ()/(51-87) 119/76     Weight: 109.7 kg (241 lb 13.5 oz)  Body mass index is 44.23 kg/m².    Intake/Output Summary (Last 24 hours) at 8/10/2024 1027  Last data filed at 8/10/2024 0518  Gross per 24 hour   Intake 620 ml   Output 3600 ml   Net -2980 ml         Physical Exam  Gen: in NAD, appears stated age, obese  Neuro: AAOx4, CN2-12 grossly intact BL; motor, sensory, and strength grossly intact BL  HEENT: NTNC, EOMI, PERRLA, MMM; no thyromegaly or lymphadenopathy; no JVD appreciated  CVS:  Right chest port sites. RRR, no m/r/g; S1/S2 auscultated with no S3 or S4; capillary refill < 2 sec  Resp: lungs CTAB, no w/r/r; no belabored breathing or accessory muscle use appreciated   Abd: BS+ in all 4 quadrants; NTND, soft to palpation; no organomegaly appreciated   Extrem: LUE swelling. pulses full, equal, and regular over all 4 extremities; no UE or LE edema BL          Significant Labs: All pertinent labs within the past 24 hours have been reviewed.    Significant Imaging: I have reviewed all pertinent imaging results/findings within the past 24 hours.

## 2024-08-10 NOTE — PLAN OF CARE
Problem: Adult Inpatient Plan of Care  Goal: Plan of Care Review  Outcome: Not Progressing  Goal: Optimal Comfort and Wellbeing  Outcome: Not Progressing     Problem: Pain Acute  Goal: Optimal Pain Control and Function  Outcome: Not Progressing     Problem: Thrombolytic Therapy  Goal: Absence of Bleeding  Outcome: Progressing     Problem: Fluid Volume Excess  Goal: Fluid Balance  Outcome: Progressing

## 2024-08-10 NOTE — PROGRESS NOTES
[facility] ([address] Medicine  Progress Note    Patient Name: Nazanin Malone  MRN: [id number]  Patient Class: IP- Inpatient   Admission Date: 7/28/2024  Length of Stay: 13 days  Attending Physician: Aftab Nixon MD  Primary Care Provider: Pee Montgomery MD        Subjective:     Principal Problem:Sickle cell pain crisis        HPI:  Nazanin Malone is a 44 year old female with history of SC anemia with history of acute chest, beta thalassemia, HTN, obesity, iron deficiency anemia, hx of PE on apixaban, RLE fasciotomy, and recent hospitalization for bacteremia s/p antibiotic course and recent sickle cell pain crisis that presents with sickle cell pain crisis. Patient reports recurrent pain consistent with her prior sickle cell pain crisis. Her pain started 2-3 days ago which started mostly in her left chest. Pain was constant without relief despite taking her home opiate regimen. Yesterday, she noted pain in her left arm and some shortness of breath. She reports a cough with yellow sputum and felt like she was oscillating between feeling hot and cold as well as low appetite associated with nausea. Denies F/C, abdominal pain, constipation.    Of note, had recent hospitalization for staph epidermis bacteremia s/p 14 day course of IV vancomycin.    Upon arrival to the ED, patient afebrile, HDS, and saturating well on room air. Labs notable for WBC 9, Hgb 11.5 (higher than baseline), K 3.2, trop negative, BNP WNL, blood cultures x2 sent. EKG without ischemic changes. CXR without consolidation.     Overview/Hospital Course:  Patient admitted for sickle cell pain crisis. Infectious work up negative. Started on IV pain medication PRN and IVF. US of left cath/chest obtained due to superficial pain around port. US without fluid collection or mass and pain believed to be secondary to sickle cell pain crisis.  Patient with continued in the left chest and upper extremity.  Left arm with  partial DVT in left IJ.  Anticoagulation switch from apixaban to heparin drip.  IV fluids discontinued given concern for swelling and diuresed given edema.  Bilateral lower extremity ultrasound and CT chest negative for PE/DVT.  Hematology consulted, recommended extended transfusion like she has undergone previously during her sickle cell care in [place] prior to moving to [place].    Transfusion medicine consulted. Plan for exchange transfusion with 6u pRBC 8/8. General surgery consulted for left port removal; since port flushes and acute thrombosis is not an indication for port removal, general surgery signed off.    Patient with new acute left lower extremity weakness on 8/8. Stroke code called and CTA and brain MRI unremarkable. Patient had complete recovery and ambulating well. Unclear etiology but concern that patient was getting too much anti-histamines which were subsequently discontinued.    Interval History: Pt seen and examined this morning on wallace. SANCHO. PRN dilaudid dosing fell off overnight and patient receive spot dosing from night team. General surgery removed left port this AM due to clot/pain. IV benadryl Dc'd given side effects.        Objective:     Vital Signs (Most Recent):  Temp: 99.6 °F (37.6 °C) (08/10/24 0352)  Pulse: 86 (08/10/24 0352)  Resp: 17 (08/10/24 1017)  BP: 119/76 (08/10/24 0352)  SpO2: 95 % (08/10/24 0352) Vital Signs (24h Range):  Temp:  [97.6 °F (36.4 °C)-99.6 °F (37.6 °C)] 99.6 °F (37.6 °C)  Pulse:  [55-89] 86  Resp:  [15-19] 17  SpO2:  [94 %-99 %] 95 %  BP: ()/(51-87) 119/76     Weight: 109.7 kg (241 lb 13.5 oz)  Body mass index is 44.23 kg/m².    Intake/Output Summary (Last 24 hours) at 8/10/2024 1027  Last data filed at 8/10/2024 0518  Gross per 24 hour   Intake 620 ml   Output 3600 ml   Net -2980 ml         Physical Exam  Gen: in NAD, appears stated age, obese  Neuro: AAOx4, CN2-12 grossly intact BL; motor, sensory, and strength grossly intact BL  HEENT: NTNC,  EOMI, PERRLA, MMM; no thyromegaly or lymphadenopathy; no JVD appreciated  CVS:  Right chest port sites. RRR, no m/r/g; S1/S2 auscultated with no S3 or S4; capillary refill < 2 sec  Resp: lungs CTAB, no w/r/r; no belabored breathing or accessory muscle use appreciated   Abd: BS+ in all 4 quadrants; NTND, soft to palpation; no organomegaly appreciated   Extrem: LUE swelling. pulses full, equal, and regular over all 4 extremities; no UE or LE edema BL          Significant Labs: All pertinent labs within the past 24 hours have been reviewed.    Significant Imaging: I have reviewed all pertinent imaging results/findings within the past 24 hours.    Assessment/Plan:      * Sickle cell pain crisis  Hb-SS disease with vaso-occlusive pain  Patient recently moved from [place] to [place] in June 2024. Patient reported receiving RBC exchanges every 6 week via two ports in her chest for management of her sickle cell disease. Has yet to establish care in [place].     - Hgb 11.6 on admit, at baseline  - Reticulocyte count 0.7  - Continue MS contin TID  and tizanidine PRN  - Gabapentin, APAP,  - Oxycodone 15mg PRN  - Dilaudid PRN; wean as able  - s/p IVF to prevent worsening sickling  - Hematology & transfusion medicine consulted  -- S/p exchange transfusion w/ assistance of transfusion medicine  -- General Surgery consulted - plan to remove left chest port.   - Left port removed 8/10; tolerated well    Thrombosis of internal carotid, left  - US Left upper extremity 8/3 with partial deep vein thrombosis of the left internal jugular vein by the port tip   - BLE Dupplex ordered  - Discussed DVT while on Eliquis 5mg BID, and is considered treatment failure  - Transitioned to heparin drip  - CTA Chest without PE  - Hematology consulted  -- Continue hepatin drip  -- IV heparin while IP and transition to treatment dose lovenox for discharge    FLOR (acute kidney injury)  - Cr elevated to 1.5, baseline 1.0  - Likely 2/2 poor PO  intake in setting of sickle cell crisis  - Improved with IVF  - Renally dosing all medications  - Avoiding nephrotoxins  - Will continue to monitor on daily labs  - Improved    Left-sided weakness  - Resolved; likely 2/2 too much anti-histamines (atarax/benadryl)  - CTA/Brain MRI unremarkable    - Resolved      Hypokalemia  Patient's most recent potassium results are listed below.   Recent Labs     08/04/24  0432 08/05/24  0210 08/06/24  0520   K 3.9 4.0 3.9       Plan  - Replete potassium per protocol  - Monitor potassium Daily  - Patient's hypokalemia is stable    History of pulmonary embolism  - hx noted  - continue home Eliquis     Hypertension  Chronic, controlled. Latest blood pressure and vitals reviewed-     Temp:  [99.1 °F (37.3 °C)]   Pulse:  [86-92]   Resp:  [20]   BP: (121-157)/(82-94)   SpO2:  [97 %-100 %] .   Home meds for hypertension were reviewed and noted below.   Hypertension Medications               amLODIPine (NORVASC) 10 MG tablet Take 1 tablet (10 mg total) by mouth once daily.    carvediloL (COREG) 25 MG tablet Take 1 tablet (25 mg total) by mouth 2 (two) times daily.            While in the hospital, will manage blood pressure as follows; Continue home antihypertensive regimen    Will utilize p.r.n. blood pressure medication only if patient's blood pressure greater than 180/110 and she develops symptoms such as worsening chest pain or shortness of breath.      VTE Risk Mitigation (From admission, onward)           Ordered     heparin 25,000 units in dextrose 5% 250 mL (100 units/mL) infusion HIGH INTENSITY nomogram - [facility]  Continuous        Question:  Begin at (units/kg/hr)  Answer:  18    08/10/24 0934     heparin 25,000 units in dextrose 5% (100 units/ml) IV bolus from bag HIGH INTENSITY nomogram - [facility]  Once        Question:  Heparin Infusion Adjustment (DO NOT MODIFY ANSWER)  Answer:  [email or web address]\epic\Images\Pharmacy\HeparinInfusions\heparin HIGH INTENSITY nomogram for [facility] [id number].pdf     08/10/24 0947     heparin 25,000 units in dextrose 5% (100 units/ml) IV bolus from bag HIGH INTENSITY nomogram - [facility]  As needed (PRN)        Question:  Heparin Infusion Adjustment (DO NOT MODIFY ANSWER)  Answer:  [email or web address]\epic\Images\Pharmacy\HeparinInfusions\heparin HIGH INTENSITY nomogram for [facility] [id number].pdf    08/09/24 0358     heparin 25,000 units in dextrose 5% (100 units/ml) IV bolus from bag HIGH INTENSITY nomogram - [facility]  As needed (PRN)        Question:  Heparin Infusion Adjustment (DO NOT MODIFY ANSWER)  Answer:  [email or web address]\epic\Images\Pharmacy\HeparinInfusions\heparin HIGH INTENSITY nomogram for [facility] [id number].pdf    08/09/24 0358     heparin 25,000 units in dextrose 5% 250 mL (100 units/mL) infusion HIGH INTENSITY nomogram - [facility]  Continuous        Question:  Begin at (units/kg/hr)  Answer:  18    08/09/24 0358     Reason for No Pharmacological VTE Prophylaxis  Once        Question:  Reasons:  Answer:  Already adequately anticoagulated on oral Anticoagulants    07/28/24 1117     IP VTE HIGH RISK PATIENT  Once         07/28/24 1117                    Discharge Planning   ALYSE: 8/13/2024     Code Status: Full Code   Is the patient medically ready for discharge?:     Reason for patient still in hospital (select all that apply): Treatment  Discharge Plan A: Home   Discharge Delays: None known at this time              Aftab Nixon MD  Department of Hospital Medicine   [facility] ([place]-[facility])

## 2024-08-10 NOTE — ASSESSMENT & PLAN NOTE
Hb-SS disease with vaso-occlusive pain  Patient recently moved from [place] to [place] in June 2024. Patient reported receiving RBC exchanges every 6 week via two ports in her chest for management of her sickle cell disease. Has yet to establish care in [place].     - Hgb 11.6 on admit, at baseline  - Reticulocyte count 0.7  - Continue MS contin TID  and tizanidine PRN  - Gabapentin, APAP,  - Oxycodone 15mg PRN  - Dilaudid PRN; wean as able  - s/p IVF to prevent worsening sickling  - Hematology & transfusion medicine consulted  -- S/p exchange transfusion w/ assistance of transfusion medicine  -- General Surgery consulted - plan to remove left chest port.   - Left port removed 8/10; tolerated well

## 2024-08-10 NOTE — PROCEDURES
"Nazanin Malone is a 44 y.o. female patient.    Temp: 99.6 °F (37.6 °C) (08/10/24 0352)  Pulse: 86 (08/10/24 0352)  Resp: 18 (08/10/24 0848)  BP: 119/76 (08/10/24 0352)  SpO2: 95 % (08/10/24 0352)  Weight: 109.7 kg (241 lb 13.5 oz) (08/08/24 1137)  Height: 5' 2" (157.5 cm) (08/07/24 1645)        Procedure Note       Physician: Concepción Membreno MD - Resident      Diagnosis: right IJ port.      Procedure:   right port removal     Date: 08/10/2024     Location: [facility]     Anesthesia: Local      Procedure:  After informed consent and timeout was performed, Patient was placed supine and the surgical field was prepped and draped. Following this local anesthetic was injected into the previous port incision. Using a 15 blade, I was able to sharply and bluntly dissect down to the port. The capsule surrounding the port was sharply excised which freed the port out of the wound. A 3-0 vicryl U stitch was placed around the cathter and the port was removed. Hemostasis was achieved with applying pressure. The wound was irrigated and found the be hemostatic. The subcutaneous layer was closed with interrupted 3.0 vicryl sutures. The skin was closed with a running 4.0 monocryl stitch. Steri strips were placed and a pressure dressing was placed over. The patient tolerated the procedure well.       Complications/Comments:  none     Estimated Blood Loss: minimal    OK to restart heparin gtt at 12:00.     Concepción Membreno MD   General Surgery PGY3  "

## 2024-08-10 NOTE — PLAN OF CARE
Problem: Adult Inpatient Plan of Care  Goal: Plan of Care Review  Outcome: Progressing  Goal: Patient-Specific Goal (Individualized)  Outcome: Progressing  Goal: Absence of Hospital-Acquired Illness or Injury  Outcome: Progressing  Goal: Optimal Comfort and Wellbeing  Outcome: Progressing  Goal: Readiness for Transition of Care  Outcome: Progressing     Problem: Adult Inpatient Plan of Care  Goal: Plan of Care Review  Outcome: Progressing  Goal: Patient-Specific Goal (Individualized)  Outcome: Progressing  Goal: Absence of Hospital-Acquired Illness or Injury  Outcome: Progressing  Goal: Optimal Comfort and Wellbeing  Outcome: Progressing  Goal: Readiness for Transition of Care  Outcome: Progressing     Problem: Acute Kidney Injury/Impairment  Goal: Fluid and Electrolyte Balance  Outcome: Progressing  Goal: Improved Oral Intake  Outcome: Progressing  Goal: Effective Renal Function  Outcome: Progressing     Problem: Fall Injury Risk  Goal: Absence of Fall and Fall-Related Injury  Outcome: Progressing     Problem: Sickle Cell Disease  Goal: Optimal Coping with Sickle Cell Disease  Outcome: Progressing  Goal: Effective Tissue Perfusion  Outcome: Progressing  Goal: Absence of Infection Signs and Symptoms  Outcome: Progressing  Goal: Optimal Pain Control and Function  Outcome: Progressing  Goal: Optimal Oxygenation  Outcome: Progressing     Problem: Thrombolytic Therapy  Goal: Absence of Bleeding  Outcome: Progressing  Goal: Unobstructed Breathing  Outcome: Progressing     Problem: Infection  Goal: Absence of Infection Signs and Symptoms  Outcome: Progressing     Problem: Skin Injury Risk Increased  Goal: Skin Health and Integrity  Outcome: Progressing     Problem: Pain Acute  Goal: Optimal Pain Control and Function  Outcome: Progressing     Problem: Bariatric Environmental Safety  Goal: Safety Maintained with Care  Outcome: Pt had an uneventful night. VSS. Pt given prn pain medications around the clock . Pt given prn  antiemetics  Pt hep gtt turned off at 2am in anticipation for lt chest wall port removal.  Pt is free of falls and injuries. Bed in lowest position and call light within reach Safety maintained

## 2024-08-11 LAB
ALBUMIN SERPL BCP-MCNC: 2.9 G/DL (ref 3.5–5.2)
ALP SERPL-CCNC: 72 U/L (ref 55–135)
ALT SERPL W/O P-5'-P-CCNC: 64 U/L (ref 10–44)
ANION GAP SERPL CALC-SCNC: 10 MMOL/L (ref 8–16)
APTT PPP: 46.5 SEC (ref 21–32)
APTT PPP: 60.1 SEC (ref 21–32)
AST SERPL-CCNC: 95 U/L (ref 10–40)
BASOPHILS # BLD AUTO: 0.04 K/UL (ref 0–0.2)
BASOPHILS # BLD AUTO: 0.04 K/UL (ref 0–0.2)
BASOPHILS NFR BLD: 0.6 % (ref 0–1.9)
BASOPHILS NFR BLD: 0.6 % (ref 0–1.9)
BILIRUB SERPL-MCNC: 0.4 MG/DL (ref 0.1–1)
BUN SERPL-MCNC: 14 MG/DL (ref 6–20)
CALCIUM SERPL-MCNC: 9.1 MG/DL (ref 8.7–10.5)
CHLORIDE SERPL-SCNC: 102 MMOL/L (ref 95–110)
CO2 SERPL-SCNC: 27 MMOL/L (ref 23–29)
CREAT SERPL-MCNC: 1.3 MG/DL (ref 0.5–1.4)
DHEA SERPL-MCNC: 1.02 NG/ML (ref 0.63–4.7)
DIFFERENTIAL METHOD BLD: ABNORMAL
DIFFERENTIAL METHOD BLD: ABNORMAL
EOSINOPHIL # BLD AUTO: 0.3 K/UL (ref 0–0.5)
EOSINOPHIL # BLD AUTO: 0.3 K/UL (ref 0–0.5)
EOSINOPHIL NFR BLD: 4.6 % (ref 0–8)
EOSINOPHIL NFR BLD: 4.6 % (ref 0–8)
ERYTHROCYTE [DISTWIDTH] IN BLOOD BY AUTOMATED COUNT: 21.8 % (ref 11.5–14.5)
ERYTHROCYTE [DISTWIDTH] IN BLOOD BY AUTOMATED COUNT: 21.8 % (ref 11.5–14.5)
EST. GFR  (NO RACE VARIABLE): 51.4 ML/MIN/1.73 M^2
GLUCOSE SERPL-MCNC: 90 MG/DL (ref 70–110)
HCT VFR BLD AUTO: 27.7 % (ref 37–48.5)
HCT VFR BLD AUTO: 27.7 % (ref 37–48.5)
HGB BLD-MCNC: 9.1 G/DL (ref 12–16)
HGB BLD-MCNC: 9.1 G/DL (ref 12–16)
IMM GRANULOCYTES # BLD AUTO: 0.02 K/UL (ref 0–0.04)
IMM GRANULOCYTES # BLD AUTO: 0.02 K/UL (ref 0–0.04)
IMM GRANULOCYTES NFR BLD AUTO: 0.3 % (ref 0–0.5)
IMM GRANULOCYTES NFR BLD AUTO: 0.3 % (ref 0–0.5)
LYMPHOCYTES # BLD AUTO: 3.3 K/UL (ref 1–4.8)
LYMPHOCYTES # BLD AUTO: 3.3 K/UL (ref 1–4.8)
LYMPHOCYTES NFR BLD: 46.7 % (ref 18–48)
LYMPHOCYTES NFR BLD: 46.7 % (ref 18–48)
MAGNESIUM SERPL-MCNC: 2 MG/DL (ref 1.6–2.6)
MCH RBC QN AUTO: 26.1 PG (ref 27–31)
MCH RBC QN AUTO: 26.1 PG (ref 27–31)
MCHC RBC AUTO-ENTMCNC: 32.9 G/DL (ref 32–36)
MCHC RBC AUTO-ENTMCNC: 32.9 G/DL (ref 32–36)
MCV RBC AUTO: 80 FL (ref 82–98)
MCV RBC AUTO: 80 FL (ref 82–98)
MONOCYTES # BLD AUTO: 1 K/UL (ref 0.3–1)
MONOCYTES # BLD AUTO: 1 K/UL (ref 0.3–1)
MONOCYTES NFR BLD: 14.4 % (ref 4–15)
MONOCYTES NFR BLD: 14.4 % (ref 4–15)
NEUTROPHILS # BLD AUTO: 2.3 K/UL (ref 1.8–7.7)
NEUTROPHILS # BLD AUTO: 2.3 K/UL (ref 1.8–7.7)
NEUTROPHILS NFR BLD: 33.4 % (ref 38–73)
NEUTROPHILS NFR BLD: 33.4 % (ref 38–73)
NRBC BLD-RTO: 2 /100 WBC
NRBC BLD-RTO: 2 /100 WBC
PHOSPHATE SERPL-MCNC: 4.7 MG/DL (ref 2.7–4.5)
PLATELET # BLD AUTO: 156 K/UL (ref 150–450)
PLATELET # BLD AUTO: 156 K/UL (ref 150–450)
PMV BLD AUTO: 10.5 FL (ref 9.2–12.9)
PMV BLD AUTO: 10.5 FL (ref 9.2–12.9)
POTASSIUM SERPL-SCNC: 3.9 MMOL/L (ref 3.5–5.1)
PROT SERPL-MCNC: 6.1 G/DL (ref 6–8.4)
RBC # BLD AUTO: 3.48 M/UL (ref 4–5.4)
RBC # BLD AUTO: 3.48 M/UL (ref 4–5.4)
SODIUM SERPL-SCNC: 139 MMOL/L (ref 136–145)
WBC # BLD AUTO: 7 K/UL (ref 3.9–12.7)
WBC # BLD AUTO: 7 K/UL (ref 3.9–12.7)

## 2024-08-11 PROCEDURE — 84100 ASSAY OF PHOSPHORUS: CPT | Performed by: STUDENT IN AN ORGANIZED HEALTH CARE EDUCATION/TRAINING PROGRAM

## 2024-08-11 PROCEDURE — 80053 COMPREHEN METABOLIC PANEL: CPT | Performed by: STUDENT IN AN ORGANIZED HEALTH CARE EDUCATION/TRAINING PROGRAM

## 2024-08-11 PROCEDURE — 25000003 PHARM REV CODE 250

## 2024-08-11 PROCEDURE — 85025 COMPLETE CBC W/AUTO DIFF WBC: CPT | Performed by: STUDENT IN AN ORGANIZED HEALTH CARE EDUCATION/TRAINING PROGRAM

## 2024-08-11 PROCEDURE — 25000003 PHARM REV CODE 250: Performed by: STUDENT IN AN ORGANIZED HEALTH CARE EDUCATION/TRAINING PROGRAM

## 2024-08-11 PROCEDURE — 85730 THROMBOPLASTIN TIME PARTIAL: CPT | Performed by: PHYSICIAN ASSISTANT

## 2024-08-11 PROCEDURE — 25000003 PHARM REV CODE 250: Performed by: INTERNAL MEDICINE

## 2024-08-11 PROCEDURE — 63600175 PHARM REV CODE 636 W HCPCS: Performed by: INTERNAL MEDICINE

## 2024-08-11 PROCEDURE — 36415 COLL VENOUS BLD VENIPUNCTURE: CPT | Performed by: PHYSICIAN ASSISTANT

## 2024-08-11 PROCEDURE — 83735 ASSAY OF MAGNESIUM: CPT | Performed by: STUDENT IN AN ORGANIZED HEALTH CARE EDUCATION/TRAINING PROGRAM

## 2024-08-11 PROCEDURE — 11000001 HC ACUTE MED/SURG PRIVATE ROOM

## 2024-08-11 RX ORDER — FUROSEMIDE 40 MG/1
40 TABLET ORAL 2 TIMES DAILY
Status: DISCONTINUED | OUTPATIENT
Start: 2024-08-11 | End: 2024-08-13

## 2024-08-11 RX ORDER — DIPHENHYDRAMINE HYDROCHLORIDE 50 MG/ML
50 INJECTION INTRAMUSCULAR; INTRAVENOUS EVERY 6 HOURS PRN
Status: DISCONTINUED | OUTPATIENT
Start: 2024-08-11 | End: 2024-08-16 | Stop reason: HOSPADM

## 2024-08-11 RX ADMIN — MORPHINE SULFATE 15 MG: 15 TABLET, EXTENDED RELEASE ORAL at 03:08

## 2024-08-11 RX ADMIN — ACETAMINOPHEN 1000 MG: 325 TABLET ORAL at 09:08

## 2024-08-11 RX ADMIN — DIPHENHYDRAMINE HYDROCHLORIDE 50 MG: 50 INJECTION, SOLUTION INTRAMUSCULAR; INTRAVENOUS at 03:08

## 2024-08-11 RX ADMIN — HYDROMORPHONE HYDROCHLORIDE 3 MG: 2 INJECTION INTRAMUSCULAR; INTRAVENOUS; SUBCUTANEOUS at 09:08

## 2024-08-11 RX ADMIN — HYDROMORPHONE HYDROCHLORIDE 3 MG: 2 INJECTION INTRAMUSCULAR; INTRAVENOUS; SUBCUTANEOUS at 03:08

## 2024-08-11 RX ADMIN — OXYCODONE HYDROCHLORIDE 20 MG: 10 TABLET ORAL at 05:08

## 2024-08-11 RX ADMIN — FLUOXETINE HYDROCHLORIDE 40 MG: 20 CAPSULE ORAL at 09:08

## 2024-08-11 RX ADMIN — OXYCODONE HYDROCHLORIDE 20 MG: 10 TABLET ORAL at 10:08

## 2024-08-11 RX ADMIN — FUROSEMIDE 40 MG: 10 INJECTION, SOLUTION INTRAVENOUS at 09:08

## 2024-08-11 RX ADMIN — TIZANIDINE 4 MG: 2 TABLET ORAL at 09:08

## 2024-08-11 RX ADMIN — HEPARIN SODIUM 16 UNITS/KG/HR: 10000 INJECTION, SOLUTION INTRAVENOUS at 03:08

## 2024-08-11 RX ADMIN — SENNOSIDES 8.6 MG: 8.6 TABLET, FILM COATED ORAL at 09:08

## 2024-08-11 RX ADMIN — DIPHENHYDRAMINE HYDROCHLORIDE 50 MG: 50 INJECTION, SOLUTION INTRAMUSCULAR; INTRAVENOUS at 09:08

## 2024-08-11 RX ADMIN — GABAPENTIN 600 MG: 300 CAPSULE ORAL at 09:08

## 2024-08-11 RX ADMIN — Medication 6 MG: at 09:08

## 2024-08-11 RX ADMIN — ACETAMINOPHEN 1000 MG: 325 TABLET ORAL at 03:08

## 2024-08-11 RX ADMIN — MORPHINE SULFATE 15 MG: 15 TABLET, EXTENDED RELEASE ORAL at 06:08

## 2024-08-11 RX ADMIN — OXYCODONE HYDROCHLORIDE 20 MG: 10 TABLET ORAL at 06:08

## 2024-08-11 RX ADMIN — FUROSEMIDE 40 MG: 40 TABLET ORAL at 05:08

## 2024-08-11 RX ADMIN — HYDROMORPHONE HYDROCHLORIDE 3 MG: 2 INJECTION INTRAMUSCULAR; INTRAVENOUS; SUBCUTANEOUS at 07:08

## 2024-08-11 RX ADMIN — MORPHINE SULFATE 15 MG: 15 TABLET, EXTENDED RELEASE ORAL at 09:08

## 2024-08-11 RX ADMIN — DIPHENHYDRAMINE HYDROCHLORIDE 50 MG: 50 INJECTION INTRAMUSCULAR; INTRAVENOUS at 03:08

## 2024-08-11 RX ADMIN — OXYCODONE HYDROCHLORIDE 20 MG: 10 TABLET ORAL at 01:08

## 2024-08-11 RX ADMIN — HEPARIN SODIUM 16 UNITS/KG/HR: 10000 INJECTION, SOLUTION INTRAVENOUS at 11:08

## 2024-08-11 RX ADMIN — OXYCODONE HYDROCHLORIDE 20 MG: 10 TABLET ORAL at 09:08

## 2024-08-11 RX ADMIN — AMLODIPINE BESYLATE 10 MG: 10 TABLET ORAL at 09:08

## 2024-08-11 RX ADMIN — HYDROMORPHONE HYDROCHLORIDE 3 MG: 2 INJECTION INTRAMUSCULAR; INTRAVENOUS; SUBCUTANEOUS at 11:08

## 2024-08-11 RX ADMIN — FOLIC ACID 1 MG: 1 TABLET ORAL at 09:08

## 2024-08-11 RX ADMIN — TRAZODONE HYDROCHLORIDE 50 MG: 50 TABLET ORAL at 09:08

## 2024-08-11 RX ADMIN — CARVEDILOL 25 MG: 25 TABLET, FILM COATED ORAL at 09:08

## 2024-08-11 NOTE — NURSING
Pt requesting to have Benadryl IV back on her PRN medication list as pt says itching is still persistent especially after every Dilaudid doses. MD Ling informed via secured chat.

## 2024-08-11 NOTE — PROGRESS NOTES
[facility] ([address] Medicine  Progress Note    Patient Name: Nazanin Malone  MRN: [id number]  Patient Class: IP- Inpatient   Admission Date: 7/28/2024  Length of Stay: 14 days  Attending Physician: Aftab Nixon MD  Primary Care Provider: Pee Montgomery MD        Subjective:     Principal Problem:Sickle cell pain crisis        HPI:  Nazanin Malone is a 44 year old female with history of SC anemia with history of acute chest, beta thalassemia, HTN, obesity, iron deficiency anemia, hx of PE on apixaban, RLE fasciotomy, and recent hospitalization for bacteremia s/p antibiotic course and recent sickle cell pain crisis that presents with sickle cell pain crisis. Patient reports recurrent pain consistent with her prior sickle cell pain crisis. Her pain started 2-3 days ago which started mostly in her left chest. Pain was constant without relief despite taking her home opiate regimen. Yesterday, she noted pain in her left arm and some shortness of breath. She reports a cough with yellow sputum and felt like she was oscillating between feeling hot and cold as well as low appetite associated with nausea. Denies F/C, abdominal pain, constipation.    Of note, had recent hospitalization for staph epidermis bacteremia s/p 14 day course of IV vancomycin.    Upon arrival to the ED, patient afebrile, HDS, and saturating well on room air. Labs notable for WBC 9, Hgb 11.5 (higher than baseline), K 3.2, trop negative, BNP WNL, blood cultures x2 sent. EKG without ischemic changes. CXR without consolidation.     Overview/Hospital Course:  Patient admitted for sickle cell pain crisis. Infectious work up negative. Started on IV pain medication PRN and IVF. US of left cath/chest obtained due to superficial pain around port. US without fluid collection or mass and pain believed to be secondary to sickle cell pain crisis.  Patient with continued in the left chest and upper extremity.  Left arm with  partial DVT in left IJ.  Anticoagulation switch from apixaban to heparin drip.  IV fluids discontinued given concern for swelling and diuresed given edema.  Bilateral lower extremity ultrasound and CT chest negative for PE/DVT.  Hematology consulted, recommended extended transfusion like she has undergone previously during her sickle cell care in [place] prior to moving to [place].    Transfusion medicine consulted. Plan for exchange transfusion with 6u pRBC 8/8. General surgery consulted for left port removal; since port flushes and acute thrombosis is not an indication for port removal, general surgery signed off.    Patient with new acute left lower extremity weakness on 8/8. Stroke code called and CTA and brain MRI unremarkable. Patient had complete recovery and ambulating well. Unclear etiology but concern that patient was getting too much anti-histamines which were subsequently discontinued.    Interval History: Pt seen and examined this morning on thea KINGSLEY. Left port removed yesterday by general surgery, tolerated well. Pain in neck resolved after port removal. LE swelling improved. Discussed with patient that now that her pain is improving we will start weaning her dilaudid tomorrow.    Objective:     Vital Signs (Most Recent):  Temp: 97.7 °F (36.5 °C) (08/11/24 0900)  Pulse: 68 (08/11/24 0900)  Resp: 18 (08/11/24 0900)  BP: 118/71 (08/11/24 0900)  SpO2: 99 % (08/11/24 0900) Vital Signs (24h Range):  Temp:  [97.7 °F (36.5 °C)-99.2 °F (37.3 °C)] 97.7 °F (36.5 °C)  Pulse:  [67-80] 68  Resp:  [17-19] 18  SpO2:  [95 %-99 %] 99 %  BP: ()/(58-78) 118/71     Weight: 109.7 kg (241 lb 13.5 oz)  Body mass index is 44.23 kg/m².    Intake/Output Summary (Last 24 hours) at 8/11/2024 1018  Last data filed at 8/11/2024 0618  Gross per 24 hour   Intake 540 ml   Output 3125 ml   Net -2585 ml         Physical Exam  Gen: in NAD, appears stated age, obese  Neuro: AAOx4, CN2-12 grossly intact BL; motor, sensory, and  strength grossly intact BL  HEENT: NTNC, EOMI, PERRLA, MMM; no thyromegaly or lymphadenopathy; no JVD appreciated  CVS:  Right chest port sites. RRR, no m/r/g; S1/S2 auscultated with no S3 or S4; capillary refill < 2 sec  Resp: lungs CTAB, no w/r/r; no belabored breathing or accessory muscle use appreciated   Abd: BS+ in all 4 quadrants; NTND, soft to palpation; no organomegaly appreciated   Extrem: LUE swelling. pulses full, equal, and regular over all 4 extremities; no UE or LE edema BL          Significant Labs: All pertinent labs within the past 24 hours have been reviewed.    Significant Imaging: I have reviewed all pertinent imaging results/findings within the past 24 hours.    Assessment/Plan:      * Sickle cell pain crisis  Hb-SS disease with vaso-occlusive pain  Patient recently moved from [place] to [place] in June 2024. Patient reported receiving RBC exchanges every 6 week via two ports in her chest for management of her sickle cell disease. Has yet to establish care in [place].     - Hgb 11.6 on admit, at baseline  - Reticulocyte count 0.7  - Continue MS contin TID  and tizanidine PRN  - Gabapentin, APAP,  - Oxycodone 15mg PRN  - Dilaudid PRN; wean as able  - s/p IVF to prevent worsening sickling  - Hematology & transfusion medicine consulted  -- S/p exchange transfusion w/ assistance of transfusion medicine  -- General Surgery consulted - plan to remove left chest port.   - Left port removed 8/10; tolerated well    Thrombosis of internal carotid, left  - US Left upper extremity 8/3 with partial deep vein thrombosis of the left internal jugular vein by the port tip   - BLE Dupplex ordered  - Discussed DVT while on Eliquis 5mg BID, and is considered treatment failure  - Transitioned to heparin drip  - CTA Chest without PE  - Hematology consulted  -- Continue hepatin drip  -- IV heparin while IP and transition to treatment dose lovenox for discharge    FLOR (acute kidney injury)  - Cr elevated to 1.5,  baseline 1.0  - Likely 2/2 poor PO intake in setting of sickle cell crisis  - Improved with IVF  - Renally dosing all medications  - Avoiding nephrotoxins  - Will continue to monitor on daily labs  - Improved    Left-sided weakness  - Resolved; likely 2/2 too much anti-histamines (atarax/benadryl)  - CTA/Brain MRI unremarkable    - Resolved      Hypokalemia  Patient's most recent potassium results are listed below.   Recent Labs     08/04/24  0432 08/05/24  0210 08/06/24  0520   K 3.9 4.0 3.9       Plan  - Replete potassium per protocol  - Monitor potassium Daily  - Patient's hypokalemia is stable    History of pulmonary embolism  - hx noted  - continue home Eliquis     Hypertension  Chronic, controlled. Latest blood pressure and vitals reviewed-     Temp:  [99.1 °F (37.3 °C)]   Pulse:  [86-92]   Resp:  [20]   BP: (121-157)/(82-94)   SpO2:  [97 %-100 %] .   Home meds for hypertension were reviewed and noted below.   Hypertension Medications               amLODIPine (NORVASC) 10 MG tablet Take 1 tablet (10 mg total) by mouth once daily.    carvediloL (COREG) 25 MG tablet Take 1 tablet (25 mg total) by mouth 2 (two) times daily.            While in the hospital, will manage blood pressure as follows; Continue home antihypertensive regimen    Will utilize p.r.n. blood pressure medication only if patient's blood pressure greater than 180/110 and she develops symptoms such as worsening chest pain or shortness of breath.      VTE Risk Mitigation (From admission, onward)           Ordered     heparin 25,000 units in dextrose 5% 250 mL (100 units/mL) infusion HIGH INTENSITY nomogram - [facility]  Continuous        Question:  Begin at (units/kg/hr)  Answer:  18    08/10/24 0934     heparin 25,000 units in dextrose 5% (100 units/ml) IV bolus from bag HIGH INTENSITY nomogram - [facility]  As needed (PRN)        Question:  Heparin Infusion Adjustment (DO NOT MODIFY ANSWER)  Answer:  [email or web address]\epic\Images\Pharmacy\HeparinInfusions\heparin  HIGH INTENSITY nomogram for [facility] [id number].pdf    08/09/24 0358     heparin 25,000 units in dextrose 5% (100 units/ml) IV bolus from bag HIGH INTENSITY nomogram - [facility]  As needed (PRN)        Question:  Heparin Infusion Adjustment (DO NOT MODIFY ANSWER)  Answer:  [email or web address]\epic\Images\Pharmacy\HeparinInfusions\heparin HIGH INTENSITY nomogram for [facility] [id number].pdf    08/09/24 0358     Reason for No Pharmacological VTE Prophylaxis  Once        Question:  Reasons:  Answer:  Already adequately anticoagulated on oral Anticoagulants    07/28/24 1117     IP VTE HIGH RISK PATIENT  Once         07/28/24 1117                    Discharge Planning   ALYSE: 8/13/2024     Code Status: Full Code   Is the patient medically ready for discharge?:     Reason for patient still in hospital (select all that apply): Treatment  Discharge Plan A: Home   Discharge Delays: None known at this time              Aftab Nixon MD  Department of Hospital Medicine   [facility] ([place]-[facility])

## 2024-08-11 NOTE — SUBJECTIVE & OBJECTIVE
Interval History: Pt seen and examined this morning on thea KINGSLEY. Left port removed yesterday by general surgery, tolerated well. Pain in neck resolved after port removal. LE swelling improved. Discussed with patient that now that her pain is improving we will start weaning her dilaudid tomorrow.    Objective:     Vital Signs (Most Recent):  Temp: 97.7 °F (36.5 °C) (08/11/24 0900)  Pulse: 68 (08/11/24 0900)  Resp: 18 (08/11/24 0900)  BP: 118/71 (08/11/24 0900)  SpO2: 99 % (08/11/24 0900) Vital Signs (24h Range):  Temp:  [97.7 °F (36.5 °C)-99.2 °F (37.3 °C)] 97.7 °F (36.5 °C)  Pulse:  [67-80] 68  Resp:  [17-19] 18  SpO2:  [95 %-99 %] 99 %  BP: ()/(58-78) 118/71     Weight: 109.7 kg (241 lb 13.5 oz)  Body mass index is 44.23 kg/m².    Intake/Output Summary (Last 24 hours) at 8/11/2024 1018  Last data filed at 8/11/2024 0618  Gross per 24 hour   Intake 540 ml   Output 3125 ml   Net -2585 ml         Physical Exam  Gen: in NAD, appears stated age, obese  Neuro: AAOx4, CN2-12 grossly intact BL; motor, sensory, and strength grossly intact BL  HEENT: NTNC, EOMI, PERRLA, MMM; no thyromegaly or lymphadenopathy; no JVD appreciated  CVS:  Right chest port sites. RRR, no m/r/g; S1/S2 auscultated with no S3 or S4; capillary refill < 2 sec  Resp: lungs CTAB, no w/r/r; no belabored breathing or accessory muscle use appreciated   Abd: BS+ in all 4 quadrants; NTND, soft to palpation; no organomegaly appreciated   Extrem: LUE swelling. pulses full, equal, and regular over all 4 extremities; no UE or LE edema BL          Significant Labs: All pertinent labs within the past 24 hours have been reviewed.    Significant Imaging: I have reviewed all pertinent imaging results/findings within the past 24 hours.

## 2024-08-11 NOTE — PLAN OF CARE
Problem: Adult Inpatient Plan of Care  Goal: Plan of Care Review  Outcome: Progressing  Goal: Optimal Comfort and Wellbeing  Outcome: Progressing     Problem: Acute Kidney Injury/Impairment  Goal: Fluid and Electrolyte Balance  Outcome: Progressing  Goal: Effective Renal Function  Outcome: Progressing     Problem: Sickle Cell Disease  Goal: Absence of Infection Signs and Symptoms  Outcome: Progressing  Goal: Optimal Pain Control and Function  Outcome: Progressing     Problem: Thrombolytic Therapy  Goal: Absence of Bleeding  Outcome: Progressing     Problem: Skin Injury Risk Increased  Goal: Skin Health and Integrity  Outcome: Progressing     Problem: Bariatric Environmental Safety  Goal: Safety Maintained with Care  Outcome: Progressing     Problem: Fluid Volume Excess  Goal: Fluid Balance  Outcome: Progressing

## 2024-08-12 LAB
ALBUMIN SERPL BCP-MCNC: 3.1 G/DL (ref 3.5–5.2)
ALP SERPL-CCNC: 71 U/L (ref 55–135)
ALT SERPL W/O P-5'-P-CCNC: 59 U/L (ref 10–44)
ANION GAP SERPL CALC-SCNC: 9 MMOL/L (ref 8–16)
APTT PPP: 56.9 SEC (ref 21–32)
AST SERPL-CCNC: 78 U/L (ref 10–40)
BASOPHILS # BLD AUTO: 0.03 K/UL (ref 0–0.2)
BASOPHILS # BLD AUTO: 0.03 K/UL (ref 0–0.2)
BASOPHILS NFR BLD: 0.4 % (ref 0–1.9)
BASOPHILS NFR BLD: 0.4 % (ref 0–1.9)
BILIRUB SERPL-MCNC: 0.3 MG/DL (ref 0.1–1)
BUN SERPL-MCNC: 17 MG/DL (ref 6–20)
CALCIUM SERPL-MCNC: 9.3 MG/DL (ref 8.7–10.5)
CHLORIDE SERPL-SCNC: 100 MMOL/L (ref 95–110)
CO2 SERPL-SCNC: 30 MMOL/L (ref 23–29)
CREAT SERPL-MCNC: 1.5 MG/DL (ref 0.5–1.4)
DIFFERENTIAL METHOD BLD: ABNORMAL
DIFFERENTIAL METHOD BLD: ABNORMAL
EOSINOPHIL # BLD AUTO: 0.4 K/UL (ref 0–0.5)
EOSINOPHIL # BLD AUTO: 0.4 K/UL (ref 0–0.5)
EOSINOPHIL NFR BLD: 5.3 % (ref 0–8)
EOSINOPHIL NFR BLD: 5.3 % (ref 0–8)
ERYTHROCYTE [DISTWIDTH] IN BLOOD BY AUTOMATED COUNT: 21.8 % (ref 11.5–14.5)
ERYTHROCYTE [DISTWIDTH] IN BLOOD BY AUTOMATED COUNT: 21.8 % (ref 11.5–14.5)
EST. GFR  (NO RACE VARIABLE): 43.3 ML/MIN/1.73 M^2
GLUCOSE SERPL-MCNC: 123 MG/DL (ref 70–110)
HCT VFR BLD AUTO: 29.5 % (ref 37–48.5)
HCT VFR BLD AUTO: 29.5 % (ref 37–48.5)
HGB BLD-MCNC: 9.5 G/DL (ref 12–16)
HGB BLD-MCNC: 9.5 G/DL (ref 12–16)
IMM GRANULOCYTES # BLD AUTO: 0.02 K/UL (ref 0–0.04)
IMM GRANULOCYTES # BLD AUTO: 0.02 K/UL (ref 0–0.04)
IMM GRANULOCYTES NFR BLD AUTO: 0.3 % (ref 0–0.5)
IMM GRANULOCYTES NFR BLD AUTO: 0.3 % (ref 0–0.5)
LYMPHOCYTES # BLD AUTO: 3.1 K/UL (ref 1–4.8)
LYMPHOCYTES # BLD AUTO: 3.1 K/UL (ref 1–4.8)
LYMPHOCYTES NFR BLD: 44.7 % (ref 18–48)
LYMPHOCYTES NFR BLD: 44.7 % (ref 18–48)
MAGNESIUM SERPL-MCNC: 2 MG/DL (ref 1.6–2.6)
MCH RBC QN AUTO: 25.9 PG (ref 27–31)
MCH RBC QN AUTO: 25.9 PG (ref 27–31)
MCHC RBC AUTO-ENTMCNC: 32.2 G/DL (ref 32–36)
MCHC RBC AUTO-ENTMCNC: 32.2 G/DL (ref 32–36)
MCV RBC AUTO: 80 FL (ref 82–98)
MCV RBC AUTO: 80 FL (ref 82–98)
MONOCYTES # BLD AUTO: 0.9 K/UL (ref 0.3–1)
MONOCYTES # BLD AUTO: 0.9 K/UL (ref 0.3–1)
MONOCYTES NFR BLD: 13.4 % (ref 4–15)
MONOCYTES NFR BLD: 13.4 % (ref 4–15)
NEUTROPHILS # BLD AUTO: 2.5 K/UL (ref 1.8–7.7)
NEUTROPHILS # BLD AUTO: 2.5 K/UL (ref 1.8–7.7)
NEUTROPHILS NFR BLD: 35.9 % (ref 38–73)
NEUTROPHILS NFR BLD: 35.9 % (ref 38–73)
NRBC BLD-RTO: 2 /100 WBC
NRBC BLD-RTO: 2 /100 WBC
PHOSPHATE SERPL-MCNC: 5.4 MG/DL (ref 2.7–4.5)
PLATELET # BLD AUTO: 202 K/UL (ref 150–450)
PLATELET # BLD AUTO: 202 K/UL (ref 150–450)
PMV BLD AUTO: 12 FL (ref 9.2–12.9)
PMV BLD AUTO: 12 FL (ref 9.2–12.9)
POTASSIUM SERPL-SCNC: 3.7 MMOL/L (ref 3.5–5.1)
PROT SERPL-MCNC: 6.3 G/DL (ref 6–8.4)
RBC # BLD AUTO: 3.67 M/UL (ref 4–5.4)
RBC # BLD AUTO: 3.67 M/UL (ref 4–5.4)
SODIUM SERPL-SCNC: 139 MMOL/L (ref 136–145)
WBC # BLD AUTO: 6.95 K/UL (ref 3.9–12.7)
WBC # BLD AUTO: 6.95 K/UL (ref 3.9–12.7)

## 2024-08-12 PROCEDURE — 63600175 PHARM REV CODE 636 W HCPCS: Performed by: INTERNAL MEDICINE

## 2024-08-12 PROCEDURE — 83735 ASSAY OF MAGNESIUM: CPT | Performed by: STUDENT IN AN ORGANIZED HEALTH CARE EDUCATION/TRAINING PROGRAM

## 2024-08-12 PROCEDURE — 25000003 PHARM REV CODE 250: Performed by: INTERNAL MEDICINE

## 2024-08-12 PROCEDURE — 11000001 HC ACUTE MED/SURG PRIVATE ROOM

## 2024-08-12 PROCEDURE — 36415 COLL VENOUS BLD VENIPUNCTURE: CPT | Performed by: PHYSICIAN ASSISTANT

## 2024-08-12 PROCEDURE — 25000003 PHARM REV CODE 250: Performed by: STUDENT IN AN ORGANIZED HEALTH CARE EDUCATION/TRAINING PROGRAM

## 2024-08-12 PROCEDURE — 85730 THROMBOPLASTIN TIME PARTIAL: CPT | Performed by: PHYSICIAN ASSISTANT

## 2024-08-12 PROCEDURE — 80053 COMPREHEN METABOLIC PANEL: CPT | Performed by: STUDENT IN AN ORGANIZED HEALTH CARE EDUCATION/TRAINING PROGRAM

## 2024-08-12 PROCEDURE — 85025 COMPLETE CBC W/AUTO DIFF WBC: CPT | Performed by: STUDENT IN AN ORGANIZED HEALTH CARE EDUCATION/TRAINING PROGRAM

## 2024-08-12 PROCEDURE — 84100 ASSAY OF PHOSPHORUS: CPT | Performed by: STUDENT IN AN ORGANIZED HEALTH CARE EDUCATION/TRAINING PROGRAM

## 2024-08-12 PROCEDURE — 25000003 PHARM REV CODE 250

## 2024-08-12 RX ORDER — HYDROMORPHONE HYDROCHLORIDE 2 MG/ML
2 INJECTION, SOLUTION INTRAMUSCULAR; INTRAVENOUS; SUBCUTANEOUS EVERY 4 HOURS PRN
Status: DISCONTINUED | OUTPATIENT
Start: 2024-08-12 | End: 2024-08-16 | Stop reason: HOSPADM

## 2024-08-12 RX ADMIN — DIPHENHYDRAMINE HYDROCHLORIDE 50 MG: 50 INJECTION, SOLUTION INTRAMUSCULAR; INTRAVENOUS at 04:08

## 2024-08-12 RX ADMIN — FOLIC ACID 1 MG: 1 TABLET ORAL at 09:08

## 2024-08-12 RX ADMIN — MORPHINE SULFATE 15 MG: 15 TABLET, EXTENDED RELEASE ORAL at 05:08

## 2024-08-12 RX ADMIN — FUROSEMIDE 40 MG: 40 TABLET ORAL at 05:08

## 2024-08-12 RX ADMIN — Medication 6 MG: at 08:08

## 2024-08-12 RX ADMIN — OXYCODONE HYDROCHLORIDE 20 MG: 10 TABLET ORAL at 01:08

## 2024-08-12 RX ADMIN — AMLODIPINE BESYLATE 10 MG: 10 TABLET ORAL at 09:08

## 2024-08-12 RX ADMIN — HYDROMORPHONE HYDROCHLORIDE 3 MG: 2 INJECTION INTRAMUSCULAR; INTRAVENOUS; SUBCUTANEOUS at 03:08

## 2024-08-12 RX ADMIN — DIPHENHYDRAMINE HYDROCHLORIDE 50 MG: 50 INJECTION, SOLUTION INTRAMUSCULAR; INTRAVENOUS at 10:08

## 2024-08-12 RX ADMIN — OXYCODONE HYDROCHLORIDE 20 MG: 10 TABLET ORAL at 05:08

## 2024-08-12 RX ADMIN — HYDROMORPHONE HYDROCHLORIDE 3 MG: 2 INJECTION INTRAMUSCULAR; INTRAVENOUS; SUBCUTANEOUS at 07:08

## 2024-08-12 RX ADMIN — FUROSEMIDE 40 MG: 40 TABLET ORAL at 09:08

## 2024-08-12 RX ADMIN — TRAZODONE HYDROCHLORIDE 50 MG: 50 TABLET ORAL at 08:08

## 2024-08-12 RX ADMIN — OXYCODONE HYDROCHLORIDE 20 MG: 10 TABLET ORAL at 10:08

## 2024-08-12 RX ADMIN — GABAPENTIN 600 MG: 300 CAPSULE ORAL at 08:08

## 2024-08-12 RX ADMIN — DIPHENHYDRAMINE HYDROCHLORIDE 50 MG: 50 INJECTION, SOLUTION INTRAMUSCULAR; INTRAVENOUS at 03:08

## 2024-08-12 RX ADMIN — MORPHINE SULFATE 15 MG: 15 TABLET, EXTENDED RELEASE ORAL at 03:08

## 2024-08-12 RX ADMIN — HEPARIN SODIUM 16 UNITS/KG/HR: 10000 INJECTION, SOLUTION INTRAVENOUS at 08:08

## 2024-08-12 RX ADMIN — HYDROMORPHONE HYDROCHLORIDE 2 MG: 2 INJECTION INTRAMUSCULAR; INTRAVENOUS; SUBCUTANEOUS at 08:08

## 2024-08-12 RX ADMIN — HYDROMORPHONE HYDROCHLORIDE 2 MG: 2 INJECTION INTRAMUSCULAR; INTRAVENOUS; SUBCUTANEOUS at 12:08

## 2024-08-12 RX ADMIN — HYDROMORPHONE HYDROCHLORIDE 2 MG: 2 INJECTION INTRAMUSCULAR; INTRAVENOUS; SUBCUTANEOUS at 04:08

## 2024-08-12 RX ADMIN — OXYCODONE HYDROCHLORIDE 15 MG: 10 TABLET ORAL at 07:08

## 2024-08-12 RX ADMIN — TIZANIDINE 4 MG: 2 TABLET ORAL at 05:08

## 2024-08-12 RX ADMIN — MORPHINE SULFATE 15 MG: 15 TABLET, EXTENDED RELEASE ORAL at 10:08

## 2024-08-12 RX ADMIN — DIPHENHYDRAMINE HYDROCHLORIDE 50 MG: 50 INJECTION, SOLUTION INTRAMUSCULAR; INTRAVENOUS at 09:08

## 2024-08-12 RX ADMIN — ACETAMINOPHEN 1000 MG: 325 TABLET ORAL at 03:08

## 2024-08-12 RX ADMIN — SENNOSIDES 8.6 MG: 8.6 TABLET, FILM COATED ORAL at 09:08

## 2024-08-12 RX ADMIN — ACETAMINOPHEN 1000 MG: 325 TABLET ORAL at 08:08

## 2024-08-12 RX ADMIN — OXYCODONE HYDROCHLORIDE 20 MG: 10 TABLET ORAL at 09:08

## 2024-08-12 RX ADMIN — FLUOXETINE HYDROCHLORIDE 40 MG: 20 CAPSULE ORAL at 09:08

## 2024-08-12 NOTE — ASSESSMENT & PLAN NOTE
Hb-SS disease with vaso-occlusive pain  Patient recently moved from [place] to [place] in June 2024. Patient reported receiving RBC exchanges every 6 week via two ports in her chest for management of her sickle cell disease. Has yet to establish care in [place].     - Hgb 11.6 on admit, at baseline  - Reticulocyte count 0.7  - Continue MS contin TID  and tizanidine PRN  - Gabapentin, APAP,  - Oxycodone 15mg PRN  - Dilaudid PRN; wean as able  - s/p IVF to prevent worsening sickling  - Hematology & transfusion medicine consulted  -- S/p exchange transfusion w/ assistance of transfusion medicine  -- General Surgery consulted - plan to remove left chest port.   - Left port removed 8/10; tolerated well    - Weaning dilaudid; decreased to 2mg today

## 2024-08-12 NOTE — PLAN OF CARE
CM went to pt room - pt sleeping, unable to reassess at this time.    THERESA MartinezN, BS, RN, CCM

## 2024-08-12 NOTE — PROGRESS NOTES
[facility] ([address] Medicine  Progress Note    Patient Name: Nazanin Malone  MRN: [id number]  Patient Class: IP- Inpatient   Admission Date: 7/28/2024  Length of Stay: 15 days  Attending Physician: Aftab Nixon MD  Primary Care Provider: Pee Montgomery MD        Subjective:     Principal Problem:Sickle cell pain crisis        HPI:  Nazanin Malone is a 44 year old female with history of SC anemia with history of acute chest, beta thalassemia, HTN, obesity, iron deficiency anemia, hx of PE on apixaban, RLE fasciotomy, and recent hospitalization for bacteremia s/p antibiotic course and recent sickle cell pain crisis that presents with sickle cell pain crisis. Patient reports recurrent pain consistent with her prior sickle cell pain crisis. Her pain started 2-3 days ago which started mostly in her left chest. Pain was constant without relief despite taking her home opiate regimen. Yesterday, she noted pain in her left arm and some shortness of breath. She reports a cough with yellow sputum and felt like she was oscillating between feeling hot and cold as well as low appetite associated with nausea. Denies F/C, abdominal pain, constipation.    Of note, had recent hospitalization for staph epidermis bacteremia s/p 14 day course of IV vancomycin.    Upon arrival to the ED, patient afebrile, HDS, and saturating well on room air. Labs notable for WBC 9, Hgb 11.5 (higher than baseline), K 3.2, trop negative, BNP WNL, blood cultures x2 sent. EKG without ischemic changes. CXR without consolidation.     Overview/Hospital Course:  Patient admitted for sickle cell pain crisis. Infectious work up negative. Started on IV pain medication PRN and IVF. US of left cath/chest obtained due to superficial pain around port. US without fluid collection or mass and pain believed to be secondary to sickle cell pain crisis.  Patient with continued in the left chest and upper extremity.  Left arm with  partial DVT in left IJ.  Anticoagulation switch from apixaban to heparin drip.  IV fluids discontinued given concern for swelling and diuresed given edema.  Bilateral lower extremity ultrasound and CT chest negative for PE/DVT.  Hematology consulted, recommended extended transfusion like she has undergone previously during her sickle cell care in [place] prior to moving to [place].    Transfusion medicine consulted. Plan for exchange transfusion with 6u pRBC 8/8. General surgery consulted for left port removal; since port flushes and acute thrombosis is not an indication for port removal, general surgery signed off.    Patient with new acute left lower extremity weakness on 8/8. Stroke code called and CTA and brain MRI unremarkable. Patient had complete recovery and ambulating well. Unclear etiology but concern that patient was getting too much anti-histamines which were subsequently discontinued.    Interval History: Pt seen and examined this morning on rounds. SANCHO. Doing better this morning. Plan to wean dilaudid today. Patient without complaints at this time.    Objective:     Vital Signs (Most Recent):  Temp: 98.6 °F (37 °C) (08/12/24 0858)  Pulse: 77 (08/12/24 0858)  Resp: 18 (08/12/24 0858)  BP: 104/67 (08/12/24 0858)  SpO2: 97 % (08/12/24 0858) Vital Signs (24h Range):  Temp:  [97.8 °F (36.6 °C)-99.8 °F (37.7 °C)] 98.6 °F (37 °C)  Pulse:  [65-77] 77  Resp:  [16-19] 18  SpO2:  [92 %-97 %] 97 %  BP: ()/(53-70) 104/67     Weight: 109.7 kg (241 lb 13.5 oz)  Body mass index is 44.23 kg/m².    Intake/Output Summary (Last 24 hours) at 8/12/2024 1020  Last data filed at 8/12/2024 0531  Gross per 24 hour   Intake 1480 ml   Output 4625 ml   Net -3145 ml         Physical Exam  Gen: in NAD, appears stated age, obese  Neuro: AAOx4, CN2-12 grossly intact BL; motor, sensory, and strength grossly intact BL  HEENT: NTNC, EOMI, PERRLA, MMM; no thyromegaly or lymphadenopathy; no JVD appreciated  CVS:  Right chest  port. RRR, no m/r/g; S1/S2 auscultated with no S3 or S4; capillary refill < 2 sec  Resp: lungs CTAB, no w/r/r; no belabored breathing or accessory muscle use appreciated   Abd: BS+ in all 4 quadrants; NTND, soft to palpation; no organomegaly appreciated   Extrem: LUE swelling. pulses full, equal, and regular over all 4 extremities; no UE or LE edema BL          Significant Labs: All pertinent labs within the past 24 hours have been reviewed.    Significant Imaging: I have reviewed all pertinent imaging results/findings within the past 24 hours.    Assessment/Plan:      * Sickle cell pain crisis  Hb-SS disease with vaso-occlusive pain  Patient recently moved from [place] to [place] in June 2024. Patient reported receiving RBC exchanges every 6 week via two ports in her chest for management of her sickle cell disease. Has yet to establish care in [place].     - Hgb 11.6 on admit, at baseline  - Reticulocyte count 0.7  - Continue MS contin TID  and tizanidine PRN  - Gabapentin, APAP,  - Oxycodone 15mg PRN  - Dilaudid PRN; wean as able  - s/p IVF to prevent worsening sickling  - Hematology & transfusion medicine consulted  -- S/p exchange transfusion w/ assistance of transfusion medicine  -- General Surgery consulted - plan to remove left chest port.   - Left port removed 8/10; tolerated well    - Weaning dilaudid; decreased to 2mg today    Thrombosis of internal carotid, left  - US Left upper extremity 8/3 with partial deep vein thrombosis of the left internal jugular vein by the port tip   - BLE Dupplex ordered  - Discussed DVT while on Eliquis 5mg BID, and is considered treatment failure  - Transitioned to heparin drip  - CTA Chest without PE  - Hematology consulted  -- Continue hepatin drip  -- IV heparin while IP and transition to treatment dose lovenox for discharge    FLOR (acute kidney injury)  - Cr elevated to 1.5, baseline 1.0  - Likely 2/2 poor PO intake in setting of sickle cell crisis  - Improved with  IVF  - Renally dosing all medications  - Avoiding nephrotoxins  - Will continue to monitor on daily labs  - Improved    Left-sided weakness  - Resolved; likely 2/2 too much anti-histamines (atarax/benadryl)  - CTA/Brain MRI unremarkable    - Resolved      Hypokalemia  Patient's most recent potassium results are listed below.   Recent Labs     08/04/24  0432 08/05/24  0210 08/06/24  0520   K 3.9 4.0 3.9       Plan  - Replete potassium per protocol  - Monitor potassium Daily  - Patient's hypokalemia is stable    History of pulmonary embolism  - hx noted  - continue home Eliquis     Hypertension  Chronic, controlled. Latest blood pressure and vitals reviewed-     Temp:  [99.1 °F (37.3 °C)]   Pulse:  [86-92]   Resp:  [20]   BP: (121-157)/(82-94)   SpO2:  [97 %-100 %] .   Home meds for hypertension were reviewed and noted below.   Hypertension Medications               amLODIPine (NORVASC) 10 MG tablet Take 1 tablet (10 mg total) by mouth once daily.    carvediloL (COREG) 25 MG tablet Take 1 tablet (25 mg total) by mouth 2 (two) times daily.            While in the hospital, will manage blood pressure as follows; Continue home antihypertensive regimen    Will utilize p.r.n. blood pressure medication only if patient's blood pressure greater than 180/110 and she develops symptoms such as worsening chest pain or shortness of breath.      VTE Risk Mitigation (From admission, onward)           Ordered     heparin 25,000 units in dextrose 5% 250 mL (100 units/mL) infusion HIGH INTENSITY nomogram - [facility]  Continuous        Question:  Begin at (units/kg/hr)  Answer:  18    08/10/24 0934     heparin 25,000 units in dextrose 5% (100 units/ml) IV bolus from bag HIGH INTENSITY nomogram - [facility]  As needed (PRN)        Question:  Heparin Infusion Adjustment (DO NOT MODIFY ANSWER)  Answer:  [email or web address]\epic\Images\Pharmacy\HeparinInfusions\heparin HIGH INTENSITY nomogram for [facility] [id number].pdf    08/09/24 0358     heparin 25,000 units in  dextrose 5% (100 units/ml) IV bolus from bag HIGH INTENSITY nomogram - [facility]  As needed (PRN)        Question:  Heparin Infusion Adjustment (DO NOT MODIFY ANSWER)  Answer:  [email or web address]\epic\Images\Pharmacy\HeparinInfusions\heparin HIGH INTENSITY nomogram for [facility] [id number].pdf    08/09/24 0358     Reason for No Pharmacological VTE Prophylaxis  Once        Question:  Reasons:  Answer:  Already adequately anticoagulated on oral Anticoagulants    07/28/24 1117     IP VTE HIGH RISK PATIENT  Once         07/28/24 1117                    Discharge Planning   ALYSE: 8/14/2024     Code Status: Full Code   Is the patient medically ready for discharge?:     Reason for patient still in hospital (select all that apply): Treatment  Discharge Plan A: Home   Discharge Delays: None known at this time              Aftab Nixon MD  Department of Hospital Medicine   [facility] ([place]-[facility])

## 2024-08-12 NOTE — SUBJECTIVE & OBJECTIVE
Interval History: Pt seen and examined this morning on rounds. SANCHO. Doing better this morning. Plan to wean dilaudid today. Patient without complaints at this time.    Objective:     Vital Signs (Most Recent):  Temp: 98.6 °F (37 °C) (08/12/24 0858)  Pulse: 77 (08/12/24 0858)  Resp: 18 (08/12/24 0858)  BP: 104/67 (08/12/24 0858)  SpO2: 97 % (08/12/24 0858) Vital Signs (24h Range):  Temp:  [97.8 °F (36.6 °C)-99.8 °F (37.7 °C)] 98.6 °F (37 °C)  Pulse:  [65-77] 77  Resp:  [16-19] 18  SpO2:  [92 %-97 %] 97 %  BP: ()/(53-70) 104/67     Weight: 109.7 kg (241 lb 13.5 oz)  Body mass index is 44.23 kg/m².    Intake/Output Summary (Last 24 hours) at 8/12/2024 1020  Last data filed at 8/12/2024 0531  Gross per 24 hour   Intake 1480 ml   Output 4625 ml   Net -3145 ml         Physical Exam  Gen: in NAD, appears stated age, obese  Neuro: AAOx4, CN2-12 grossly intact BL; motor, sensory, and strength grossly intact BL  HEENT: NTNC, EOMI, PERRLA, MMM; no thyromegaly or lymphadenopathy; no JVD appreciated  CVS:  Right chest port. RRR, no m/r/g; S1/S2 auscultated with no S3 or S4; capillary refill < 2 sec  Resp: lungs CTAB, no w/r/r; no belabored breathing or accessory muscle use appreciated   Abd: BS+ in all 4 quadrants; NTND, soft to palpation; no organomegaly appreciated   Extrem: LUE swelling. pulses full, equal, and regular over all 4 extremities; no UE or LE edema BL          Significant Labs: All pertinent labs within the past 24 hours have been reviewed.    Significant Imaging: I have reviewed all pertinent imaging results/findings within the past 24 hours.

## 2024-08-12 NOTE — PLAN OF CARE
Problem: Adult Inpatient Plan of Care  Goal: Plan of Care Review  Outcome: Progressing  Goal: Optimal Comfort and Wellbeing  Outcome: Progressing     Problem: Acute Kidney Injury/Impairment  Goal: Fluid and Electrolyte Balance  Outcome: Progressing  Goal: Effective Renal Function  Outcome: Progressing     Problem: Sickle Cell Disease  Goal: Optimal Coping with Sickle Cell Disease  Outcome: Progressing  Goal: Effective Tissue Perfusion  Outcome: Progressing  Goal: Optimal Pain Control and Function  Outcome: Progressing     Problem: Thrombolytic Therapy  Goal: Absence of Bleeding  Outcome: Progressing  Goal: Unobstructed Breathing  Outcome: Progressing     Problem: Skin Injury Risk Increased  Goal: Skin Health and Integrity  Outcome: Progressing     Problem: Bariatric Environmental Safety  Goal: Safety Maintained with Care  Outcome: Progressing     Problem: Wound  Goal: Skin Health and Integrity  Outcome: Progressing  Goal: Optimal Wound Healing  Outcome: Progressing      Scc In Situ With Follicular Extension Histology Text: Within the\\nepidermis is a full thickness proliferation of atypical keratinocytes with mitoses. The\\noverlying stratum corneum demonstrates parakeratosis. No invasion is noted. The\\natypical cells extend down hair follicle structures.

## 2024-08-12 NOTE — PLAN OF CARE
Problem: Acute Kidney Injury/Impairment  Goal: Improved Oral Intake  Outcome: Progressing     Problem: Thrombolytic Therapy  Goal: Absence of Bleeding  Outcome: Progressing   No significant changes noted. Heparin running at 16U/kg/hr. Pt therapeutic. Pain meds given accordingly. Bed locked and in lowest position. Call light in reach.

## 2024-08-13 LAB
ALBUMIN SERPL BCP-MCNC: 3 G/DL (ref 3.5–5.2)
ALP SERPL-CCNC: 72 U/L (ref 55–135)
ALT SERPL W/O P-5'-P-CCNC: 57 U/L (ref 10–44)
ANION GAP SERPL CALC-SCNC: 10 MMOL/L (ref 8–16)
APTT PPP: 57 SEC (ref 21–32)
AST SERPL-CCNC: 74 U/L (ref 10–40)
BASOPHILS # BLD AUTO: 0.02 K/UL (ref 0–0.2)
BASOPHILS # BLD AUTO: 0.02 K/UL (ref 0–0.2)
BASOPHILS NFR BLD: 0.3 % (ref 0–1.9)
BASOPHILS NFR BLD: 0.3 % (ref 0–1.9)
BILIRUB SERPL-MCNC: 0.4 MG/DL (ref 0.1–1)
BUN SERPL-MCNC: 20 MG/DL (ref 6–20)
CALCIUM SERPL-MCNC: 9.4 MG/DL (ref 8.7–10.5)
CHLORIDE SERPL-SCNC: 101 MMOL/L (ref 95–110)
CO2 SERPL-SCNC: 29 MMOL/L (ref 23–29)
CREAT SERPL-MCNC: 1.5 MG/DL (ref 0.5–1.4)
DIFFERENTIAL METHOD BLD: ABNORMAL
DIFFERENTIAL METHOD BLD: ABNORMAL
EOSINOPHIL # BLD AUTO: 0.4 K/UL (ref 0–0.5)
EOSINOPHIL # BLD AUTO: 0.4 K/UL (ref 0–0.5)
EOSINOPHIL NFR BLD: 5.3 % (ref 0–8)
EOSINOPHIL NFR BLD: 5.3 % (ref 0–8)
ERYTHROCYTE [DISTWIDTH] IN BLOOD BY AUTOMATED COUNT: 21.7 % (ref 11.5–14.5)
ERYTHROCYTE [DISTWIDTH] IN BLOOD BY AUTOMATED COUNT: 21.7 % (ref 11.5–14.5)
EST. GFR  (NO RACE VARIABLE): 43.3 ML/MIN/1.73 M^2
GLUCOSE SERPL-MCNC: 79 MG/DL (ref 70–110)
HCT VFR BLD AUTO: 29.5 % (ref 37–48.5)
HCT VFR BLD AUTO: 29.5 % (ref 37–48.5)
HGB BLD-MCNC: 9.4 G/DL (ref 12–16)
HGB BLD-MCNC: 9.4 G/DL (ref 12–16)
IMM GRANULOCYTES # BLD AUTO: 0.01 K/UL (ref 0–0.04)
IMM GRANULOCYTES # BLD AUTO: 0.01 K/UL (ref 0–0.04)
IMM GRANULOCYTES NFR BLD AUTO: 0.1 % (ref 0–0.5)
IMM GRANULOCYTES NFR BLD AUTO: 0.1 % (ref 0–0.5)
LYMPHOCYTES # BLD AUTO: 3.2 K/UL (ref 1–4.8)
LYMPHOCYTES # BLD AUTO: 3.2 K/UL (ref 1–4.8)
LYMPHOCYTES NFR BLD: 43 % (ref 18–48)
LYMPHOCYTES NFR BLD: 43 % (ref 18–48)
MAGNESIUM SERPL-MCNC: 2.1 MG/DL (ref 1.6–2.6)
MCH RBC QN AUTO: 25.7 PG (ref 27–31)
MCH RBC QN AUTO: 25.7 PG (ref 27–31)
MCHC RBC AUTO-ENTMCNC: 31.9 G/DL (ref 32–36)
MCHC RBC AUTO-ENTMCNC: 31.9 G/DL (ref 32–36)
MCV RBC AUTO: 81 FL (ref 82–98)
MCV RBC AUTO: 81 FL (ref 82–98)
MONOCYTES # BLD AUTO: 1.1 K/UL (ref 0.3–1)
MONOCYTES # BLD AUTO: 1.1 K/UL (ref 0.3–1)
MONOCYTES NFR BLD: 14.5 % (ref 4–15)
MONOCYTES NFR BLD: 14.5 % (ref 4–15)
NEUTROPHILS # BLD AUTO: 2.7 K/UL (ref 1.8–7.7)
NEUTROPHILS # BLD AUTO: 2.7 K/UL (ref 1.8–7.7)
NEUTROPHILS NFR BLD: 36.8 % (ref 38–73)
NEUTROPHILS NFR BLD: 36.8 % (ref 38–73)
NRBC BLD-RTO: 1 /100 WBC
NRBC BLD-RTO: 1 /100 WBC
PHOSPHATE SERPL-MCNC: 5.3 MG/DL (ref 2.7–4.5)
PLATELET # BLD AUTO: 202 K/UL (ref 150–450)
PLATELET # BLD AUTO: 202 K/UL (ref 150–450)
PMV BLD AUTO: 10.6 FL (ref 9.2–12.9)
PMV BLD AUTO: 10.6 FL (ref 9.2–12.9)
POTASSIUM SERPL-SCNC: 4.2 MMOL/L (ref 3.5–5.1)
PROT SERPL-MCNC: 6.3 G/DL (ref 6–8.4)
RBC # BLD AUTO: 3.66 M/UL (ref 4–5.4)
RBC # BLD AUTO: 3.66 M/UL (ref 4–5.4)
SODIUM SERPL-SCNC: 140 MMOL/L (ref 136–145)
WBC # BLD AUTO: 7.33 K/UL (ref 3.9–12.7)
WBC # BLD AUTO: 7.33 K/UL (ref 3.9–12.7)

## 2024-08-13 PROCEDURE — 83735 ASSAY OF MAGNESIUM: CPT | Performed by: STUDENT IN AN ORGANIZED HEALTH CARE EDUCATION/TRAINING PROGRAM

## 2024-08-13 PROCEDURE — 63600175 PHARM REV CODE 636 W HCPCS: Performed by: INTERNAL MEDICINE

## 2024-08-13 PROCEDURE — 36415 COLL VENOUS BLD VENIPUNCTURE: CPT | Performed by: PHYSICIAN ASSISTANT

## 2024-08-13 PROCEDURE — 25000003 PHARM REV CODE 250

## 2024-08-13 PROCEDURE — 25000003 PHARM REV CODE 250: Performed by: INTERNAL MEDICINE

## 2024-08-13 PROCEDURE — 25000003 PHARM REV CODE 250: Performed by: STUDENT IN AN ORGANIZED HEALTH CARE EDUCATION/TRAINING PROGRAM

## 2024-08-13 PROCEDURE — 85730 THROMBOPLASTIN TIME PARTIAL: CPT | Performed by: PHYSICIAN ASSISTANT

## 2024-08-13 PROCEDURE — 11000001 HC ACUTE MED/SURG PRIVATE ROOM

## 2024-08-13 PROCEDURE — 63600175 PHARM REV CODE 636 W HCPCS: Performed by: STUDENT IN AN ORGANIZED HEALTH CARE EDUCATION/TRAINING PROGRAM

## 2024-08-13 PROCEDURE — 85025 COMPLETE CBC W/AUTO DIFF WBC: CPT | Performed by: STUDENT IN AN ORGANIZED HEALTH CARE EDUCATION/TRAINING PROGRAM

## 2024-08-13 PROCEDURE — 84100 ASSAY OF PHOSPHORUS: CPT | Performed by: STUDENT IN AN ORGANIZED HEALTH CARE EDUCATION/TRAINING PROGRAM

## 2024-08-13 PROCEDURE — 80053 COMPREHEN METABOLIC PANEL: CPT | Performed by: STUDENT IN AN ORGANIZED HEALTH CARE EDUCATION/TRAINING PROGRAM

## 2024-08-13 RX ORDER — SULFAMETHOXAZOLE AND TRIMETHOPRIM 800; 160 MG/1; MG/1
1 TABLET ORAL 2 TIMES DAILY
Status: DISCONTINUED | OUTPATIENT
Start: 2024-08-13 | End: 2024-08-13

## 2024-08-13 RX ORDER — HYDROMORPHONE HYDROCHLORIDE 2 MG/ML
2 INJECTION, SOLUTION INTRAMUSCULAR; INTRAVENOUS; SUBCUTANEOUS ONCE
Status: COMPLETED | OUTPATIENT
Start: 2024-08-13 | End: 2024-08-13

## 2024-08-13 RX ORDER — AMOXICILLIN AND CLAVULANATE POTASSIUM 875; 125 MG/1; MG/1
1 TABLET, FILM COATED ORAL EVERY 12 HOURS
Status: DISCONTINUED | OUTPATIENT
Start: 2024-08-13 | End: 2024-08-13

## 2024-08-13 RX ORDER — SULFAMETHOXAZOLE AND TRIMETHOPRIM 800; 160 MG/1; MG/1
1 TABLET ORAL 2 TIMES DAILY
Status: DISCONTINUED | OUTPATIENT
Start: 2024-08-13 | End: 2024-08-14

## 2024-08-13 RX ORDER — HYDROMORPHONE HYDROCHLORIDE 2 MG/ML
2 INJECTION, SOLUTION INTRAMUSCULAR; INTRAVENOUS; SUBCUTANEOUS EVERY 6 HOURS PRN
Status: CANCELLED | OUTPATIENT
Start: 2024-08-13

## 2024-08-13 RX ORDER — DOXYCYCLINE HYCLATE 100 MG
100 TABLET ORAL EVERY 12 HOURS
Status: DISCONTINUED | OUTPATIENT
Start: 2024-08-13 | End: 2024-08-13

## 2024-08-13 RX ADMIN — HYDROMORPHONE HYDROCHLORIDE 2 MG: 2 INJECTION INTRAMUSCULAR; INTRAVENOUS; SUBCUTANEOUS at 10:08

## 2024-08-13 RX ADMIN — LACTULOSE 20 G: 20 SOLUTION ORAL at 05:08

## 2024-08-13 RX ADMIN — TIZANIDINE 4 MG: 2 TABLET ORAL at 09:08

## 2024-08-13 RX ADMIN — ACETAMINOPHEN 1000 MG: 325 TABLET ORAL at 08:08

## 2024-08-13 RX ADMIN — GABAPENTIN 600 MG: 300 CAPSULE ORAL at 08:08

## 2024-08-13 RX ADMIN — OXYCODONE HYDROCHLORIDE 20 MG: 10 TABLET ORAL at 02:08

## 2024-08-13 RX ADMIN — PROCHLORPERAZINE EDISYLATE 2.5 MG: 5 INJECTION INTRAMUSCULAR; INTRAVENOUS at 12:08

## 2024-08-13 RX ADMIN — FOLIC ACID 1 MG: 1 TABLET ORAL at 08:08

## 2024-08-13 RX ADMIN — HYDROMORPHONE HYDROCHLORIDE 2 MG: 2 INJECTION INTRAMUSCULAR; INTRAVENOUS; SUBCUTANEOUS at 08:08

## 2024-08-13 RX ADMIN — HYDROMORPHONE HYDROCHLORIDE 2 MG: 2 INJECTION INTRAMUSCULAR; INTRAVENOUS; SUBCUTANEOUS at 12:08

## 2024-08-13 RX ADMIN — OXYCODONE HYDROCHLORIDE 20 MG: 10 TABLET ORAL at 06:08

## 2024-08-13 RX ADMIN — SENNOSIDES 8.6 MG: 8.6 TABLET, FILM COATED ORAL at 08:08

## 2024-08-13 RX ADMIN — DIPHENHYDRAMINE HYDROCHLORIDE 50 MG: 50 INJECTION, SOLUTION INTRAMUSCULAR; INTRAVENOUS at 04:08

## 2024-08-13 RX ADMIN — MORPHINE SULFATE 15 MG: 15 TABLET, EXTENDED RELEASE ORAL at 09:08

## 2024-08-13 RX ADMIN — AMLODIPINE BESYLATE 10 MG: 10 TABLET ORAL at 08:08

## 2024-08-13 RX ADMIN — PROCHLORPERAZINE EDISYLATE 2.5 MG: 5 INJECTION INTRAMUSCULAR; INTRAVENOUS at 08:08

## 2024-08-13 RX ADMIN — HYDROMORPHONE HYDROCHLORIDE 2 MG: 2 INJECTION INTRAMUSCULAR; INTRAVENOUS; SUBCUTANEOUS at 05:08

## 2024-08-13 RX ADMIN — HYDROMORPHONE HYDROCHLORIDE 2 MG: 2 INJECTION INTRAMUSCULAR; INTRAVENOUS; SUBCUTANEOUS at 07:08

## 2024-08-13 RX ADMIN — DIPHENHYDRAMINE HYDROCHLORIDE 50 MG: 50 INJECTION, SOLUTION INTRAMUSCULAR; INTRAVENOUS at 05:08

## 2024-08-13 RX ADMIN — OXYCODONE HYDROCHLORIDE 20 MG: 10 TABLET ORAL at 11:08

## 2024-08-13 RX ADMIN — TRAZODONE HYDROCHLORIDE 50 MG: 50 TABLET ORAL at 08:08

## 2024-08-13 RX ADMIN — HYDROMORPHONE HYDROCHLORIDE 2 MG: 2 INJECTION INTRAMUSCULAR; INTRAVENOUS; SUBCUTANEOUS at 04:08

## 2024-08-13 RX ADMIN — SULFAMETHOXAZOLE AND TRIMETHOPRIM 1 TABLET: 800; 160 TABLET ORAL at 06:08

## 2024-08-13 RX ADMIN — TIZANIDINE 4 MG: 2 TABLET ORAL at 08:08

## 2024-08-13 RX ADMIN — OXYCODONE HYDROCHLORIDE 20 MG: 10 TABLET ORAL at 09:08

## 2024-08-13 RX ADMIN — DIPHENHYDRAMINE HYDROCHLORIDE 50 MG: 50 INJECTION, SOLUTION INTRAMUSCULAR; INTRAVENOUS at 11:08

## 2024-08-13 RX ADMIN — FUROSEMIDE 40 MG: 40 TABLET ORAL at 08:08

## 2024-08-13 RX ADMIN — MORPHINE SULFATE 15 MG: 15 TABLET, EXTENDED RELEASE ORAL at 06:08

## 2024-08-13 RX ADMIN — DIPHENHYDRAMINE HYDROCHLORIDE 50 MG: 50 INJECTION, SOLUTION INTRAMUSCULAR; INTRAVENOUS at 07:08

## 2024-08-13 RX ADMIN — HYDROMORPHONE HYDROCHLORIDE 2 MG: 2 INJECTION INTRAMUSCULAR; INTRAVENOUS; SUBCUTANEOUS at 09:08

## 2024-08-13 RX ADMIN — HEPARIN SODIUM 16 UNITS/KG/HR: 10000 INJECTION, SOLUTION INTRAVENOUS at 04:08

## 2024-08-13 RX ADMIN — FLUOXETINE HYDROCHLORIDE 40 MG: 20 CAPSULE ORAL at 08:08

## 2024-08-13 NOTE — PLAN OF CARE
Patient stable, AOX4 , VSS.  Pain management done. Heparin infusion going on 16U/kg/Hr. Safety measures ensured.call light within reach.bed in low position. No distress at night.

## 2024-08-13 NOTE — PLAN OF CARE
[facility] ([facility])  Discharge Reassessment    Primary Care Provider: Pee Montgomery MD    Expected Discharge Date: 8/14/2024    Reassessment (most recent)       Discharge Reassessment - 08/13/24 0930          Discharge Reassessment    Assessment Type Discharge Planning Assessment (P)      Did the patient's condition or plan change since previous assessment? No (P)      Discharge Plan discussed with: Patient (P)      Communicated ALYSE with patient/caregiver No (P)      Discharge Plan A Home (P)      Discharge Plan B Home (P)      DME Needed Upon Discharge  none (P)      Transition of Care Barriers None (P)      Why the patient remains in the hospital Requires continued medical care (P)         Post-Acute Status    Coverage [employer] (P)      Discharge Delays None known at this time (P)                  CM spoke with pt in room.  Dispo: home.  Pt states family will give her a ride home, or she will drive herself if she's not had any pain medicines.  No DME needed.    MAYELA Martinez, BS, RN, CCM      Discharge Plan A and Plan B have been determined by review of patient's clinical status, future medical and therapeutic needs, and coverage/benefits for post-acute care in coordination with multidisciplinary team members.

## 2024-08-13 NOTE — ASSESSMENT & PLAN NOTE
Hb-SS disease with vaso-occlusive pain  Patient recently moved from [place] to [place] in June 2024. Patient reported receiving RBC exchanges every 6 week via two ports in her chest for management of her sickle cell disease. Has yet to establish care in [place].     - Hgb 11.6 on admit, at baseline  - Reticulocyte count 0.7  - Continue MS contin TID  and tizanidine PRN  - Gabapentin, APAP,  - Oxycodone 15mg PRN  - Dilaudid PRN; wean as able  - s/p IVF to prevent worsening sickling  - Hematology & transfusion medicine consulted  -- S/p exchange transfusion w/ assistance of transfusion medicine  -- General Surgery consulted - plan to remove left chest port.   - Left port removed 8/10; tolerated well   - 8/13: swelling of left port site 3 days after removal; concerning for hematoma   - Hep gtt held 4 hours; pressure dressing applied.    - Weaning dilaudid; decreased to 2mg today

## 2024-08-13 NOTE — PLAN OF CARE
Problem: Sickle Cell Disease  Goal: Optimal Pain Control and Function  Outcome: Progressing     Problem: Thrombolytic Therapy  Goal: Absence of Bleeding  Outcome: Progressing   Pt c/o 10/10 pain located where her L.port was located. Area noted to be swollen and hard. Two addition dose of 2mg dilaudid and early dose of benadryl given early per MD order. At 1000, pt still of 10/10 pain and incision site feel warm. US L.axilla done. Third additional 2mg dilaudid given after verifying vitals were stable and pt alert and oriented. Bed locked and in lowest position. Call light in reach.

## 2024-08-13 NOTE — PROGRESS NOTES
[facility] ([address] Medicine  Progress Note    Patient Name: Nazanin Malone  MRN: [id number]  Patient Class: IP- Inpatient   Admission Date: 7/28/2024  Length of Stay: 16 days  Attending Physician: Aftab Nixon MD  Primary Care Provider: Pee Montgomery MD        Subjective:     Principal Problem:Sickle cell pain crisis        HPI:  Nazanin Malone is a 44 year old female with history of SC anemia with history of acute chest, beta thalassemia, HTN, obesity, iron deficiency anemia, hx of PE on apixaban, RLE fasciotomy, and recent hospitalization for bacteremia s/p antibiotic course and recent sickle cell pain crisis that presents with sickle cell pain crisis. Patient reports recurrent pain consistent with her prior sickle cell pain crisis. Her pain started 2-3 days ago which started mostly in her left chest. Pain was constant without relief despite taking her home opiate regimen. Yesterday, she noted pain in her left arm and some shortness of breath. She reports a cough with yellow sputum and felt like she was oscillating between feeling hot and cold as well as low appetite associated with nausea. Denies F/C, abdominal pain, constipation.    Of note, had recent hospitalization for staph epidermis bacteremia s/p 14 day course of IV vancomycin.    Upon arrival to the ED, patient afebrile, HDS, and saturating well on room air. Labs notable for WBC 9, Hgb 11.5 (higher than baseline), K 3.2, trop negative, BNP WNL, blood cultures x2 sent. EKG without ischemic changes. CXR without consolidation.     Overview/Hospital Course:  Patient admitted for sickle cell pain crisis. Infectious work up negative. Started on IV pain medication PRN and IVF. US of left cath/chest obtained due to superficial pain around port. US without fluid collection or mass and pain believed to be secondary to sickle cell pain crisis.  Patient with continued in the left chest and upper extremity.  Left arm with  partial DVT in left IJ.  Anticoagulation switch from apixaban to heparin drip.  IV fluids discontinued given concern for swelling and diuresed given edema.  Bilateral lower extremity ultrasound and CT chest negative for PE/DVT.  Hematology consulted, recommended extended transfusion like she has undergone previously during her sickle cell care in [place] prior to moving to [place].    Transfusion medicine consulted. Plan for exchange transfusion with 6u pRBC 8/8. General surgery consulted for left port removal; since port flushes and acute thrombosis is not an indication for port removal, general surgery signed off.    Patient with new acute left lower extremity weakness on 8/8. Stroke code called and CTA and brain MRI unremarkable. Patient had complete recovery and ambulating well. Left chest port removed by general surgery given continued pain. Three days post removal, patient had acute swelling where left chest port was. General surgery assessed wound and concerned for hematoma and heparin gtt held 4 hours and pressure dressing applied.    Interval History: Pt seen and examined this morning on rounds. Patient had acute swelling where left chest port was early this AM. No erythema. General surgery assessed wound and concerned for hematoma and heparin gtt held 4 hours and pressure dressing applied. Having pain around site. Will obtain soft tissue ultrasound.     Objective:     Vital Signs (Most Recent):  Temp: 98.4 °F (36.9 °C) (08/13/24 0814)  Pulse: 87 (08/13/24 0814)  Resp: 18 (08/13/24 0951)  BP: 109/72 (08/13/24 0815)  SpO2: 95 % (08/13/24 0814) Vital Signs (24h Range):  Temp:  [98.4 °F (36.9 °C)-98.8 °F (37.1 °C)] 98.4 °F (36.9 °C)  Pulse:  [69-87] 87  Resp:  [18] 18  SpO2:  [95 %-100 %] 95 %  BP: ()/(50-72) 109/72     Weight: 109.7 kg (241 lb 13.5 oz)  Body mass index is 44.23 kg/m².    Intake/Output Summary (Last 24 hours) at 8/13/2024 1015  Last data filed at 8/13/2024 0747  Gross per 24 hour    Intake --   Output 3900 ml   Net -3900 ml         Physical Exam  Gen: in NAD, appears stated age, obese  Neuro: AAOx4, CN2-12 grossly intact BL; motor, sensory, and strength grossly intact BL  HEENT: NTNC, EOMI, PERRLA, MMM; no thyromegaly or lymphadenopathy; no JVD appreciated  CVS:  Right chest port, swelling at old left chest port c/f hematoma. RRR, no m/r/g; S1/S2 auscultated with no S3 or S4; capillary refill < 2 sec  Resp: lungs CTAB, no w/r/r; no belabored breathing or accessory muscle use appreciated   Abd: BS+ in all 4 quadrants; NTND, soft to palpation; no organomegaly appreciated   Extrem: pulses full, equal, and regular over all 4 extremities; no UE or LE edema BL          Significant Labs: All pertinent labs within the past 24 hours have been reviewed.    Significant Imaging: I have reviewed all pertinent imaging results/findings within the past 24 hours.    Assessment/Plan:      * Sickle cell pain crisis  Hb-SS disease with vaso-occlusive pain  Patient recently moved from [place] to [place] in June 2024. Patient reported receiving RBC exchanges every 6 week via two ports in her chest for management of her sickle cell disease. Has yet to establish care in [place].     - Hgb 11.6 on admit, at baseline  - Reticulocyte count 0.7  - Continue MS contin TID  and tizanidine PRN  - Gabapentin, APAP,  - Oxycodone 15mg PRN  - Dilaudid PRN; wean as able  - s/p IVF to prevent worsening sickling  - Hematology & transfusion medicine consulted  -- S/p exchange transfusion w/ assistance of transfusion medicine  -- General Surgery consulted - plan to remove left chest port.   - Left port removed 8/10; tolerated well   - 8/13: swelling of left port site 3 days after removal; concerning for hematoma   - Hep gtt held 4 hours; pressure dressing applied.    - Weaning dilaudid; decreased to 2mg today    Thrombosis of internal carotid, left  - US Left upper extremity 8/3 with partial deep vein thrombosis of the left  internal jugular vein by the port tip   - BLE Dupplex ordered  - Discussed DVT while on Eliquis 5mg BID, and is considered treatment failure  - Transitioned to heparin drip  - CTA Chest without PE  - Hematology consulted  -- Continue hepatin drip  -- IV heparin while IP and transition to treatment dose lovenox for discharge    FLOR (acute kidney injury)  - Cr elevated to 1.5, baseline 1.0  - Likely 2/2 poor PO intake in setting of sickle cell crisis  - Improved with IVF  - Renally dosing all medications  - Avoiding nephrotoxins  - Will continue to monitor on daily labs  - Improved    Left-sided weakness  - Resolved; likely 2/2 too much anti-histamines (atarax/benadryl)  - CTA/Brain MRI unremarkable    - Resolved      Hypokalemia  Patient's most recent potassium results are listed below.   Recent Labs     08/04/24  0432 08/05/24  0210 08/06/24  0520   K 3.9 4.0 3.9       Plan  - Replete potassium per protocol  - Monitor potassium Daily  - Patient's hypokalemia is stable    History of pulmonary embolism  - hx noted  - continue home Eliquis     Hypertension  Chronic, controlled. Latest blood pressure and vitals reviewed-     Temp:  [99.1 °F (37.3 °C)]   Pulse:  [86-92]   Resp:  [20]   BP: (121-157)/(82-94)   SpO2:  [97 %-100 %] .   Home meds for hypertension were reviewed and noted below.   Hypertension Medications               amLODIPine (NORVASC) 10 MG tablet Take 1 tablet (10 mg total) by mouth once daily.    carvediloL (COREG) 25 MG tablet Take 1 tablet (25 mg total) by mouth 2 (two) times daily.            While in the hospital, will manage blood pressure as follows; Continue home antihypertensive regimen    Will utilize p.r.n. blood pressure medication only if patient's blood pressure greater than 180/110 and she develops symptoms such as worsening chest pain or shortness of breath.      VTE Risk Mitigation (From admission, onward)           Ordered     heparin 25,000 units in dextrose 5% 250 mL (100 units/mL)  infusion HIGH INTENSITY nomogram - [facility]  Continuous        Question:  Begin at (units/kg/hr)  Answer:  18    08/10/24 0934     heparin 25,000 units in dextrose 5% (100 units/ml) IV bolus from bag HIGH INTENSITY nomogram - [facility]  As needed (PRN)        Question:  Heparin Infusion Adjustment (DO NOT MODIFY ANSWER)  Answer:  [email or web address]\epic\Images\Pharmacy\HeparinInfusions\heparin HIGH INTENSITY nomogram for [facility] [id number].pdf    08/09/24 0358     heparin 25,000 units in dextrose 5% (100 units/ml) IV bolus from bag HIGH INTENSITY nomogram - [facility]  As needed (PRN)        Question:  Heparin Infusion Adjustment (DO NOT MODIFY ANSWER)  Answer:  [email or web address]\epic\Images\Pharmacy\HeparinInfusions\heparin HIGH INTENSITY nomogram for [facility] [id number].pdf    08/09/24 0358     Reason for No Pharmacological VTE Prophylaxis  Once        Question:  Reasons:  Answer:  Already adequately anticoagulated on oral Anticoagulants    07/28/24 1117     IP VTE HIGH RISK PATIENT  Once         07/28/24 1117                    Discharge Planning   ALYSE: 8/14/2024     Code Status: Full Code   Is the patient medically ready for discharge?:     Reason for patient still in hospital (select all that apply): Treatment  Discharge Plan A: Home   Discharge Delays: None known at this time              Aftab Nixon MD  Department of Hospital Medicine   [facility] ([place])

## 2024-08-13 NOTE — PROGRESS NOTES
Medical Nutrition Therapy      Reason for Assessment: LOS  Dx: sickle cell pain crisis   Medical Hx: HTN    Interdisciplinary Rounds: Did not Attend  Discharge planning: Regular diet     General Info: Spoke w/ pt at bedside, overall reports a good appetite, reports some n/v this morning, however resolved today. No nutritional concerns noted, pt does not meet criteria for malnutrition.     Current Diet:  Regular diet     Ht: 5'2  Wt: 109.7 kg    Labs: Reviewed  Meds: Reviewed    Overall Physical Appearance: Well Nourished    Level of Risk: Low    Nutrition Dx: No nutrition diagnosis at this time:    Next Follow Up Date: 8/20

## 2024-08-13 NOTE — SUBJECTIVE & OBJECTIVE
Interval History: Pt seen and examined this morning on rounds. Patient had acute swelling where left chest port was early this AM. No erythema. General surgery assessed wound and concerned for hematoma and heparin gtt held 4 hours and pressure dressing applied. Having pain around site. Will obtain soft tissue ultrasound.     Objective:     Vital Signs (Most Recent):  Temp: 98.4 °F (36.9 °C) (08/13/24 0814)  Pulse: 87 (08/13/24 0814)  Resp: 18 (08/13/24 0951)  BP: 109/72 (08/13/24 0815)  SpO2: 95 % (08/13/24 0814) Vital Signs (24h Range):  Temp:  [98.4 °F (36.9 °C)-98.8 °F (37.1 °C)] 98.4 °F (36.9 °C)  Pulse:  [69-87] 87  Resp:  [18] 18  SpO2:  [95 %-100 %] 95 %  BP: ()/(50-72) 109/72     Weight: 109.7 kg (241 lb 13.5 oz)  Body mass index is 44.23 kg/m².    Intake/Output Summary (Last 24 hours) at 8/13/2024 1015  Last data filed at 8/13/2024 0747  Gross per 24 hour   Intake --   Output 3900 ml   Net -3900 ml         Physical Exam  Gen: in NAD, appears stated age, obese  Neuro: AAOx4, CN2-12 grossly intact BL; motor, sensory, and strength grossly intact BL  HEENT: NTNC, EOMI, PERRLA, MMM; no thyromegaly or lymphadenopathy; no JVD appreciated  CVS:  Right chest port, swelling at old left chest port c/f hematoma. RRR, no m/r/g; S1/S2 auscultated with no S3 or S4; capillary refill < 2 sec  Resp: lungs CTAB, no w/r/r; no belabored breathing or accessory muscle use appreciated   Abd: BS+ in all 4 quadrants; NTND, soft to palpation; no organomegaly appreciated   Extrem: pulses full, equal, and regular over all 4 extremities; no UE or LE edema BL          Significant Labs: All pertinent labs within the past 24 hours have been reviewed.    Significant Imaging: I have reviewed all pertinent imaging results/findings within the past 24 hours.

## 2024-08-14 LAB
ALBUMIN SERPL BCP-MCNC: 3.1 G/DL (ref 3.5–5.2)
ALP SERPL-CCNC: 72 U/L (ref 55–135)
ALT SERPL W/O P-5'-P-CCNC: 53 U/L (ref 10–44)
ANION GAP SERPL CALC-SCNC: 9 MMOL/L (ref 8–16)
APTT PPP: 46 SEC (ref 21–32)
AST SERPL-CCNC: 72 U/L (ref 10–40)
BASOPHILS # BLD AUTO: 0.03 K/UL (ref 0–0.2)
BASOPHILS # BLD AUTO: 0.03 K/UL (ref 0–0.2)
BASOPHILS NFR BLD: 0.4 % (ref 0–1.9)
BASOPHILS NFR BLD: 0.4 % (ref 0–1.9)
BILIRUB SERPL-MCNC: 0.3 MG/DL (ref 0.1–1)
BUN SERPL-MCNC: 25 MG/DL (ref 6–20)
CALCIUM SERPL-MCNC: 9.1 MG/DL (ref 8.7–10.5)
CHLORIDE SERPL-SCNC: 100 MMOL/L (ref 95–110)
CO2 SERPL-SCNC: 27 MMOL/L (ref 23–29)
CREAT SERPL-MCNC: 2 MG/DL (ref 0.5–1.4)
CREAT UR-MCNC: 54 MG/DL (ref 15–325)
DIFFERENTIAL METHOD BLD: ABNORMAL
DIFFERENTIAL METHOD BLD: ABNORMAL
EOSINOPHIL # BLD AUTO: 0.4 K/UL (ref 0–0.5)
EOSINOPHIL # BLD AUTO: 0.4 K/UL (ref 0–0.5)
EOSINOPHIL NFR BLD: 5.3 % (ref 0–8)
EOSINOPHIL NFR BLD: 5.3 % (ref 0–8)
ERYTHROCYTE [DISTWIDTH] IN BLOOD BY AUTOMATED COUNT: 21.9 % (ref 11.5–14.5)
ERYTHROCYTE [DISTWIDTH] IN BLOOD BY AUTOMATED COUNT: 21.9 % (ref 11.5–14.5)
EST. GFR  (NO RACE VARIABLE): 30.6 ML/MIN/1.73 M^2
GLUCOSE SERPL-MCNC: 89 MG/DL (ref 70–110)
HCT VFR BLD AUTO: 28.3 % (ref 37–48.5)
HCT VFR BLD AUTO: 28.3 % (ref 37–48.5)
HGB BLD-MCNC: 9 G/DL (ref 12–16)
HGB BLD-MCNC: 9 G/DL (ref 12–16)
IMM GRANULOCYTES # BLD AUTO: 0.01 K/UL (ref 0–0.04)
IMM GRANULOCYTES # BLD AUTO: 0.01 K/UL (ref 0–0.04)
IMM GRANULOCYTES NFR BLD AUTO: 0.1 % (ref 0–0.5)
IMM GRANULOCYTES NFR BLD AUTO: 0.1 % (ref 0–0.5)
LYMPHOCYTES # BLD AUTO: 3 K/UL (ref 1–4.8)
LYMPHOCYTES # BLD AUTO: 3 K/UL (ref 1–4.8)
LYMPHOCYTES NFR BLD: 42.8 % (ref 18–48)
LYMPHOCYTES NFR BLD: 42.8 % (ref 18–48)
MAGNESIUM SERPL-MCNC: 2.2 MG/DL (ref 1.6–2.6)
MCH RBC QN AUTO: 25.3 PG (ref 27–31)
MCH RBC QN AUTO: 25.3 PG (ref 27–31)
MCHC RBC AUTO-ENTMCNC: 31.8 G/DL (ref 32–36)
MCHC RBC AUTO-ENTMCNC: 31.8 G/DL (ref 32–36)
MCV RBC AUTO: 80 FL (ref 82–98)
MCV RBC AUTO: 80 FL (ref 82–98)
MONOCYTES # BLD AUTO: 1 K/UL (ref 0.3–1)
MONOCYTES # BLD AUTO: 1 K/UL (ref 0.3–1)
MONOCYTES NFR BLD: 13.8 % (ref 4–15)
MONOCYTES NFR BLD: 13.8 % (ref 4–15)
NEUTROPHILS # BLD AUTO: 2.7 K/UL (ref 1.8–7.7)
NEUTROPHILS # BLD AUTO: 2.7 K/UL (ref 1.8–7.7)
NEUTROPHILS NFR BLD: 37.6 % (ref 38–73)
NEUTROPHILS NFR BLD: 37.6 % (ref 38–73)
NRBC BLD-RTO: 1 /100 WBC
NRBC BLD-RTO: 1 /100 WBC
PHOSPHATE SERPL-MCNC: 5 MG/DL (ref 2.7–4.5)
PLATELET # BLD AUTO: 230 K/UL (ref 150–450)
PLATELET # BLD AUTO: 230 K/UL (ref 150–450)
PMV BLD AUTO: 10.5 FL (ref 9.2–12.9)
PMV BLD AUTO: 10.5 FL (ref 9.2–12.9)
POTASSIUM SERPL-SCNC: 4 MMOL/L (ref 3.5–5.1)
PROT SERPL-MCNC: 6.3 G/DL (ref 6–8.4)
RBC # BLD AUTO: 3.56 M/UL (ref 4–5.4)
RBC # BLD AUTO: 3.56 M/UL (ref 4–5.4)
SODIUM SERPL-SCNC: 136 MMOL/L (ref 136–145)
SODIUM UR-SCNC: 64 MMOL/L (ref 20–250)
UUN UR-MCNC: 346 MG/DL (ref 140–1050)
WBC # BLD AUTO: 7.04 K/UL (ref 3.9–12.7)
WBC # BLD AUTO: 7.04 K/UL (ref 3.9–12.7)

## 2024-08-14 PROCEDURE — 80053 COMPREHEN METABOLIC PANEL: CPT | Performed by: STUDENT IN AN ORGANIZED HEALTH CARE EDUCATION/TRAINING PROGRAM

## 2024-08-14 PROCEDURE — 84100 ASSAY OF PHOSPHORUS: CPT | Performed by: STUDENT IN AN ORGANIZED HEALTH CARE EDUCATION/TRAINING PROGRAM

## 2024-08-14 PROCEDURE — 83735 ASSAY OF MAGNESIUM: CPT | Performed by: STUDENT IN AN ORGANIZED HEALTH CARE EDUCATION/TRAINING PROGRAM

## 2024-08-14 PROCEDURE — 84300 ASSAY OF URINE SODIUM: CPT | Performed by: INTERNAL MEDICINE

## 2024-08-14 PROCEDURE — 85730 THROMBOPLASTIN TIME PARTIAL: CPT | Performed by: PHYSICIAN ASSISTANT

## 2024-08-14 PROCEDURE — 63600175 PHARM REV CODE 636 W HCPCS: Performed by: STUDENT IN AN ORGANIZED HEALTH CARE EDUCATION/TRAINING PROGRAM

## 2024-08-14 PROCEDURE — 25000003 PHARM REV CODE 250: Performed by: STUDENT IN AN ORGANIZED HEALTH CARE EDUCATION/TRAINING PROGRAM

## 2024-08-14 PROCEDURE — 85025 COMPLETE CBC W/AUTO DIFF WBC: CPT | Performed by: STUDENT IN AN ORGANIZED HEALTH CARE EDUCATION/TRAINING PROGRAM

## 2024-08-14 PROCEDURE — 36415 COLL VENOUS BLD VENIPUNCTURE: CPT | Performed by: STUDENT IN AN ORGANIZED HEALTH CARE EDUCATION/TRAINING PROGRAM

## 2024-08-14 PROCEDURE — 84540 ASSAY OF URINE/UREA-N: CPT | Performed by: INTERNAL MEDICINE

## 2024-08-14 PROCEDURE — 82570 ASSAY OF URINE CREATININE: CPT | Performed by: INTERNAL MEDICINE

## 2024-08-14 PROCEDURE — 63600175 PHARM REV CODE 636 W HCPCS: Performed by: INTERNAL MEDICINE

## 2024-08-14 PROCEDURE — 25000003 PHARM REV CODE 250

## 2024-08-14 PROCEDURE — 25000003 PHARM REV CODE 250: Performed by: INTERNAL MEDICINE

## 2024-08-14 PROCEDURE — 11000001 HC ACUTE MED/SURG PRIVATE ROOM

## 2024-08-14 RX ORDER — DIPHENHYDRAMINE HYDROCHLORIDE 50 MG/ML
25 INJECTION INTRAMUSCULAR; INTRAVENOUS ONCE
Status: COMPLETED | OUTPATIENT
Start: 2024-08-14 | End: 2024-08-14

## 2024-08-14 RX ORDER — HYDROMORPHONE HYDROCHLORIDE 2 MG/ML
3 INJECTION, SOLUTION INTRAMUSCULAR; INTRAVENOUS; SUBCUTANEOUS ONCE
Status: COMPLETED | OUTPATIENT
Start: 2024-08-14 | End: 2024-08-14

## 2024-08-14 RX ORDER — SODIUM CHLORIDE 9 MG/ML
INJECTION, SOLUTION INTRAVENOUS CONTINUOUS
Status: DISCONTINUED | OUTPATIENT
Start: 2024-08-14 | End: 2024-08-16 | Stop reason: HOSPADM

## 2024-08-14 RX ADMIN — TIZANIDINE 4 MG: 2 TABLET ORAL at 05:08

## 2024-08-14 RX ADMIN — TRAZODONE HYDROCHLORIDE 50 MG: 50 TABLET ORAL at 08:08

## 2024-08-14 RX ADMIN — DIPHENHYDRAMINE HYDROCHLORIDE 50 MG: 50 INJECTION, SOLUTION INTRAMUSCULAR; INTRAVENOUS at 05:08

## 2024-08-14 RX ADMIN — ACETAMINOPHEN 1000 MG: 325 TABLET ORAL at 03:08

## 2024-08-14 RX ADMIN — ACETAMINOPHEN 1000 MG: 325 TABLET ORAL at 08:08

## 2024-08-14 RX ADMIN — PROCHLORPERAZINE EDISYLATE 2.5 MG: 5 INJECTION INTRAMUSCULAR; INTRAVENOUS at 01:08

## 2024-08-14 RX ADMIN — DIPHENHYDRAMINE HYDROCHLORIDE 50 MG: 50 INJECTION, SOLUTION INTRAMUSCULAR; INTRAVENOUS at 03:08

## 2024-08-14 RX ADMIN — VANCOMYCIN HYDROCHLORIDE 1500 MG: 1.5 INJECTION, POWDER, LYOPHILIZED, FOR SOLUTION INTRAVENOUS at 09:08

## 2024-08-14 RX ADMIN — OXYCODONE HYDROCHLORIDE 20 MG: 10 TABLET ORAL at 05:08

## 2024-08-14 RX ADMIN — HYDROMORPHONE HYDROCHLORIDE 2 MG: 2 INJECTION INTRAMUSCULAR; INTRAVENOUS; SUBCUTANEOUS at 08:08

## 2024-08-14 RX ADMIN — HYDROMORPHONE HYDROCHLORIDE 3 MG: 2 INJECTION INTRAMUSCULAR; INTRAVENOUS; SUBCUTANEOUS at 08:08

## 2024-08-14 RX ADMIN — OXYCODONE HYDROCHLORIDE 20 MG: 10 TABLET ORAL at 01:08

## 2024-08-14 RX ADMIN — GABAPENTIN 600 MG: 300 CAPSULE ORAL at 08:08

## 2024-08-14 RX ADMIN — FLUOXETINE HYDROCHLORIDE 40 MG: 20 CAPSULE ORAL at 08:08

## 2024-08-14 RX ADMIN — HYDROMORPHONE HYDROCHLORIDE 2 MG: 2 INJECTION INTRAMUSCULAR; INTRAVENOUS; SUBCUTANEOUS at 07:08

## 2024-08-14 RX ADMIN — DIPHENHYDRAMINE HYDROCHLORIDE 25 MG: 50 INJECTION, SOLUTION INTRAMUSCULAR; INTRAVENOUS at 08:08

## 2024-08-14 RX ADMIN — OXYCODONE HYDROCHLORIDE 20 MG: 10 TABLET ORAL at 09:08

## 2024-08-14 RX ADMIN — DIPHENHYDRAMINE HYDROCHLORIDE 50 MG: 50 INJECTION, SOLUTION INTRAMUSCULAR; INTRAVENOUS at 09:08

## 2024-08-14 RX ADMIN — HYDROMORPHONE HYDROCHLORIDE 2 MG: 2 INJECTION INTRAMUSCULAR; INTRAVENOUS; SUBCUTANEOUS at 03:08

## 2024-08-14 RX ADMIN — AMLODIPINE BESYLATE 10 MG: 10 TABLET ORAL at 08:08

## 2024-08-14 RX ADMIN — SENNOSIDES 8.6 MG: 8.6 TABLET, FILM COATED ORAL at 08:08

## 2024-08-14 RX ADMIN — MORPHINE SULFATE 15 MG: 15 TABLET, EXTENDED RELEASE ORAL at 05:08

## 2024-08-14 RX ADMIN — FOLIC ACID 1 MG: 1 TABLET ORAL at 08:08

## 2024-08-14 RX ADMIN — TIZANIDINE 4 MG: 2 TABLET ORAL at 09:08

## 2024-08-14 RX ADMIN — MORPHINE SULFATE 15 MG: 15 TABLET, EXTENDED RELEASE ORAL at 01:08

## 2024-08-14 RX ADMIN — MORPHINE SULFATE 15 MG: 15 TABLET, EXTENDED RELEASE ORAL at 09:08

## 2024-08-14 RX ADMIN — SODIUM CHLORIDE: 9 INJECTION, SOLUTION INTRAVENOUS at 08:08

## 2024-08-14 RX ADMIN — PROCHLORPERAZINE EDISYLATE 2.5 MG: 5 INJECTION INTRAMUSCULAR; INTRAVENOUS at 07:08

## 2024-08-14 RX ADMIN — HYDROMORPHONE HYDROCHLORIDE 2 MG: 2 INJECTION INTRAMUSCULAR; INTRAVENOUS; SUBCUTANEOUS at 11:08

## 2024-08-14 RX ADMIN — PROMETHAZINE HYDROCHLORIDE 25 MG: 12.5 TABLET ORAL at 08:08

## 2024-08-14 NOTE — SUBJECTIVE & OBJECTIVE
Interval History: Pt seen and examined this morning on rounds. Developed erythema around old chest port site and bactrim transitioned to Vanc for developing cellulitis. General surgery to follow for need of possible draining. Patient developed FLOR 1.5 -> 2.0 after initiation of bactrim; likely pseuo-elevation in setting of bactrim and continues to have good UOP.     Objective:     Vital Signs (Most Recent):  Temp: 98.8 °F (37.1 °C) (08/14/24 0826)  Pulse: 78 (08/14/24 0826)  Resp: 16 (08/14/24 1124)  BP: 103/67 (08/14/24 0826)  SpO2: 96 % (08/14/24 0826) Vital Signs (24h Range):  Temp:  [97.8 °F (36.6 °C)-99.2 °F (37.3 °C)] 98.8 °F (37.1 °C)  Pulse:  [70-83] 78  Resp:  [16-20] 16  SpO2:  [87 %-99 %] 96 %  BP: ()/(60-81) 103/67     Weight: 109.7 kg (241 lb 13.5 oz)  Body mass index is 44.23 kg/m².    Intake/Output Summary (Last 24 hours) at 8/14/2024 1129  Last data filed at 8/14/2024 0549  Gross per 24 hour   Intake --   Output 1200 ml   Net -1200 ml         Physical Exam  Gen: in NAD, appears stated age, obese  Neuro: AAOx4, CN2-12 grossly intact BL; motor, sensory, and strength grossly intact BL  HEENT: NTNC, EOMI, PERRLA, MMM; no thyromegaly or lymphadenopathy; no JVD appreciated  CVS:   RRR, no m/r/g; S1/S2 auscultated with no S3 or S4; capillary refill < 2 sec  Resp: lungs CTAB, no w/r/r; no belabored breathing or accessory muscle use appreciated   Abd: BS+ in all 4 quadrants; NTND, soft to palpation; no organomegaly appreciated   Extrem: pulses full, equal, and regular over all 4 extremities; no UE or LE edema BL          Significant Labs: All pertinent labs within the past 24 hours have been reviewed.    Significant Imaging: I have reviewed all pertinent imaging results/findings within the past 24 hours.

## 2024-08-14 NOTE — ASSESSMENT & PLAN NOTE
- Cr elevated to 1.5, baseline 1.0  - Likely 2/2 poor PO intake in setting of sickle cell crisis  - Improved with IVF  - Renally dosing all medications  - Avoiding nephrotoxins  - Will continue to monitor on daily labs  - Patient developed worsening FLOR Cr 1.5 -> 2.0 after initiation of bactrim; likely pseuo-elevation in setting of bactrim and continues to have good UOP.   - CTM daily; urine studies pending

## 2024-08-14 NOTE — PROGRESS NOTES
Pharmacokinetic Assessment Follow Up: IV Vancomycin    Therapy with vancomycin complete and/or consult discontinued by provider. Pharmacy will sign off, please re-consult as needed.    Bubba Barriga, PharmD, BCPS

## 2024-08-14 NOTE — PROGRESS NOTES
[facility] ([place] [address])  [facility]  Progress Note    Patient Name: Nazanin Malone  MRN: [id number]  Patient Class: IP- Inpatient   Admission Date: 7/28/2024  Length of Stay: 17 days  Attending Physician: Aftab Nioxn MD  Primary Care Provider: Pee Montgomery MD        Subjective:     Principal Problem:Sickle cell pain crisis        HPI:  Nazanin Malone is a 44 year old female with history of SC anemia with history of acute chest, beta thalassemia, HTN, obesity, iron deficiency anemia, hx of PE on apixaban, RLE fasciotomy, and recent hospitalization for bacteremia s/p antibiotic course and recent sickle cell pain crisis that presents with sickle cell pain crisis. Patient reports recurrent pain consistent with her prior sickle cell pain crisis. Her pain started 2-3 days ago which started mostly in her left chest. Pain was constant without relief despite taking her home opiate regimen. Yesterday, she noted pain in her left arm and some shortness of breath. She reports a cough with yellow sputum and felt like she was oscillating between feeling hot and cold as well as low appetite associated with nausea. Denies F/C, abdominal pain, constipation.    Of note, had recent hospitalization for staph epidermis bacteremia s/p 14 day course of IV vancomycin.    Upon arrival to the ED, patient afebrile, HDS, and saturating well on room air. Labs notable for WBC 9, Hgb 11.5 (higher than baseline), K 3.2, trop negative, BNP WNL, blood cultures x2 sent. EKG without ischemic changes. CXR without consolidation.     Overview/Hospital Course:  Patient admitted for sickle cell pain crisis. Infectious work up negative. Started on IV pain medication PRN and IVF. US of left cath/chest obtained due to superficial pain around port. US without fluid collection or mass and pain believed to be secondary to sickle cell pain crisis.  Patient with continued in the left chest and upper extremity.  Left arm with  partial DVT in left IJ.  Anticoagulation switch from apixaban to heparin drip.  IV fluids discontinued given concern for swelling and diuresed given edema.  Bilateral lower extremity ultrasound and CT chest negative for PE/DVT.  Hematology consulted, recommended extended transfusion like she has undergone previously during her sickle cell care in [place] prior to moving to [place].    Transfusion medicine consulted. Plan for exchange transfusion with 6u pRBC 8/8. General surgery consulted for left port removal; since port flushes and acute thrombosis is not an indication for port removal, general surgery signed off.    Patient with new acute left lower extremity weakness on 8/8. Stroke code called and CTA and brain MRI unremarkable. Patient had complete recovery and ambulating well. Left chest port removed by general surgery given continued pain. Three days post removal, patient had acute swelling where left chest port was. General surgery assessed wound and concerned for hematoma and heparin gtt held 4 hours and pressure dressing applied and started on bactrim out of caution. Developed erythema and bactrim transitioned to Vanc for developing cellulitis. General surgery to follow for need of possible draining.     Patient developed FLOR 1.5 -> 2.0 after initiation of bactrim; likely pseuo-elevation in setting of bactrim and continues to have good UOP.     Interval History: Pt seen and examined this morning on rounds. Developed erythema around old chest port site and bactrim transitioned to Vanc for developing cellulitis. General surgery to follow for need of possible draining. Patient developed FLOR 1.5 -> 2.0 after initiation of bactrim; likely pseuo-elevation in setting of bactrim and continues to have good UOP.     Objective:     Vital Signs (Most Recent):  Temp: 98.8 °F (37.1 °C) (08/14/24 0826)  Pulse: 78 (08/14/24 0826)  Resp: 16 (08/14/24 1124)  BP: 103/67 (08/14/24 0826)  SpO2: 96 % (08/14/24 0826) Vital  Signs (24h Range):  Temp:  [97.8 °F (36.6 °C)-99.2 °F (37.3 °C)] 98.8 °F (37.1 °C)  Pulse:  [70-83] 78  Resp:  [16-20] 16  SpO2:  [87 %-99 %] 96 %  BP: ()/(60-81) 103/67     Weight: 109.7 kg (241 lb 13.5 oz)  Body mass index is 44.23 kg/m².    Intake/Output Summary (Last 24 hours) at 8/14/2024 1129  Last data filed at 8/14/2024 0549  Gross per 24 hour   Intake --   Output 1200 ml   Net -1200 ml         Physical Exam  Gen: in NAD, appears stated age, obese  Neuro: AAOx4, CN2-12 grossly intact BL; motor, sensory, and strength grossly intact BL  HEENT: NTNC, EOMI, PERRLA, MMM; no thyromegaly or lymphadenopathy; no JVD appreciated  CVS:   RRR, no m/r/g; S1/S2 auscultated with no S3 or S4; capillary refill < 2 sec  Resp: lungs CTAB, no w/r/r; no belabored breathing or accessory muscle use appreciated   Abd: BS+ in all 4 quadrants; NTND, soft to palpation; no organomegaly appreciated   Extrem: pulses full, equal, and regular over all 4 extremities; no UE or LE edema BL          Significant Labs: All pertinent labs within the past 24 hours have been reviewed.    Significant Imaging: I have reviewed all pertinent imaging results/findings within the past 24 hours.    Assessment/Plan:      * Sickle cell pain crisis  Hb-SS disease with vaso-occlusive pain  Patient recently moved from [place] to [place] in June 2024. Patient reported receiving RBC exchanges every 6 week via two ports in her chest for management of her sickle cell disease. Has yet to establish care in [place].     - Hgb 11.6 on admit, at baseline  - Reticulocyte count 0.7  - Continue MS contin TID  and tizanidine PRN  - Gabapentin, APAP,  - Oxycodone 15mg PRN  - Dilaudid PRN; wean as able  - s/p IVF to prevent worsening sickling  - Hematology & transfusion medicine consulted  -- S/p exchange transfusion w/ assistance of transfusion medicine  -- General Surgery consulted - plan to remove left chest port.   - Left port removed 8/10; tolerated well   -  8/13: swelling of left port site 3 days after removal; concerning for hematoma   - Hep gtt held 4 hours; pressure dressing applied.    - Weaning dilaudid; decreased to 2mg today    Thrombosis of internal carotid, left  - US Left upper extremity 8/3 with partial deep vein thrombosis of the left internal jugular vein by the port tip   - BLE Dupplex ordered  - Discussed DVT while on Eliquis 5mg BID, and is considered treatment failure  - Transitioned to heparin drip  - CTA Chest without PE  - Hematology consulted  -- Continue hepatin drip  -- IV heparin while IP (keeping on heparin given may need lancing of wound infection as above) and transition to treatment dose lovenox for discharge    FLOR (acute kidney injury)  - Cr elevated to 1.5, baseline 1.0  - Likely 2/2 poor PO intake in setting of sickle cell crisis  - Improved with IVF  - Renally dosing all medications  - Avoiding nephrotoxins  - Will continue to monitor on daily labs  - Patient developed worsening FLOR Cr 1.5 -> 2.0 after initiation of bactrim; likely pseuo-elevation in setting of bactrim and continues to have good UOP.   - CTM daily; urine studies pending    Left-sided weakness  - Resolved; likely 2/2 too much anti-histamines (atarax/benadryl)  - CTA/Brain MRI unremarkable    - Resolved      Hypokalemia  Patient's most recent potassium results are listed below.   Recent Labs     08/04/24  0432 08/05/24  0210 08/06/24  0520   K 3.9 4.0 3.9       Plan  - Replete potassium per protocol  - Monitor potassium Daily  - Patient's hypokalemia is stable    History of pulmonary embolism  - hx noted  - continue home Eliquis     Hypertension  Chronic, controlled. Latest blood pressure and vitals reviewed-     Temp:  [99.1 °F (37.3 °C)]   Pulse:  [86-92]   Resp:  [20]   BP: (121-157)/(82-94)   SpO2:  [97 %-100 %] .   Home meds for hypertension were reviewed and noted below.   Hypertension Medications               amLODIPine (NORVASC) 10 MG tablet Take 1 tablet (10 mg  total) by mouth once daily.    carvediloL (COREG) 25 MG tablet Take 1 tablet (25 mg total) by mouth 2 (two) times daily.            While in the hospital, will manage blood pressure as follows; Continue home antihypertensive regimen    Will utilize p.r.n. blood pressure medication only if patient's blood pressure greater than 180/110 and she develops symptoms such as worsening chest pain or shortness of breath.      VTE Risk Mitigation (From admission, onward)           Ordered     heparin 25,000 units in dextrose 5% 250 mL (100 units/mL) infusion HIGH INTENSITY nomogram - [facility]  Continuous        Question:  Begin at (units/kg/hr)  Answer:  18    08/10/24 0934     heparin 25,000 units in dextrose 5% (100 units/ml) IV bolus from bag HIGH INTENSITY nomogram - [facility]  As needed (PRN)        Question:  Heparin Infusion Adjustment (DO NOT MODIFY ANSWER)  Answer:  [email or web address]\epic\Images\Pharmacy\HeparinInfusions\heparin HIGH INTENSITY nomogram for [facility] [id number].pdf    08/09/24 0358     heparin 25,000 units in dextrose 5% (100 units/ml) IV bolus from bag HIGH INTENSITY nomogram - [facility]  As needed (PRN)        Question:  Heparin Infusion Adjustment (DO NOT MODIFY ANSWER)  Answer:  [email or web address]\epic\Images\Pharmacy\HeparinInfusions\heparin HIGH INTENSITY nomogram for [facility] [id number].pdf    08/09/24 0358     Reason for No Pharmacological VTE Prophylaxis  Once        Question:  Reasons:  Answer:  Already adequately anticoagulated on oral Anticoagulants    07/28/24 1117     IP VTE HIGH RISK PATIENT  Once         07/28/24 1117                    Discharge Planning   ALYSE: 8/16/2024     Code Status: Full Code   Is the patient medically ready for discharge?:     Reason for patient still in hospital (select all that apply): Treatment  Discharge Plan A: Home   Discharge Delays: None known at this time              Aftab Nixon MD  Department of Hospital Medicine   [facility] ([place])

## 2024-08-14 NOTE — PLAN OF CARE
General Surgery Plan of Care:    Patient had left sided port removed on 8/10 without issue.  Developed underlying hematoma on 8/13, which has significantly improved with compression.  Examined today, seemed to have developed some overlying cellulitis, agree with primary team for IV abx.   Unlikely to be abscess or infected hematoma at this time, does not warrant drainage but will continue to monitor in case it develops into one.    Concepción Membreno MD   General Surgery PGY3

## 2024-08-14 NOTE — ASSESSMENT & PLAN NOTE
- US Left upper extremity 8/3 with partial deep vein thrombosis of the left internal jugular vein by the port tip   - BLE Dupplex ordered  - Discussed DVT while on Eliquis 5mg BID, and is considered treatment failure  - Transitioned to heparin drip  - CTA Chest without PE  - Hematology consulted  -- Continue hepatin drip  -- IV heparin while IP (keeping on heparin given may need lancing of wound infection as above) and transition to treatment dose lovenox for discharge

## 2024-08-14 NOTE — PROGRESS NOTES
Pharmacokinetic Initial Assessment: IV Vancomycin    Assessment/Plan:    Given FLOR and patients BMI, will plan to pulse dose  Plan to give a single 1.5 g IV vancomycin dose today and follow up with a random level scheduled w/ 8/15 AM labs  Desired empiric serum trough concentration is 10 to 20 mcg/mL for SSTI  The trough goal may be adjusted based on the patients clinical course and culture results   Pharmacy will continue to follow and monitor vancomycin.      Please contact pharmacy at extension [phone] with any questions regarding this assessment.     Thank you for the consult,   Bubba Barriga, PharmD, [facility]      Patient brief summary:  Nazanin Malone is a 44 y.o. female initiated on antimicrobial therapy with IV Vancomycin for treatment of suspected skin & soft tissue infection    Drug Allergies:   Review of patient's allergies indicates:  No Known Allergies    Actual Body Weight:   109.7 kg    Renal Function:   Estimated Creatinine Clearance: 41 mL/min (A) (based on SCr of 2 mg/dL (H)).,     Dialysis Method (if applicable):  N/A    CBC (last 72 hours):  Recent Labs   Lab Result Units 08/12/24 0247 08/13/24 0428 08/14/24  0330   WBC K/uL 6.95  6.95 7.33  7.33 7.04  7.04   Hemoglobin g/dL 9.5*  9.5* 9.4*  9.4* 9.0*  9.0*   Hematocrit % 29.5*  29.5* 29.5*  29.5* 28.3*  28.3*   Platelets K/uL 202  202 202  202 230  230   Gran % % 35.9*  35.9* 36.8*  36.8* 37.6*  37.6*   Lymph % % 44.7  44.7 43.0  43.0 42.8  42.8   Mono % % 13.4  13.4 14.5  14.5 13.8  13.8   Eosinophil % % 5.3  5.3 5.3  5.3 5.3  5.3   Basophil % % 0.4  0.4 0.3  0.3 0.4  0.4   Differential Method  Automated  Automated Automated  Automated Automated  Automated       Metabolic Panel (last 72 hours):  Recent Labs   Lab Result Units 08/12/24 0247 08/13/24 0428 08/14/24  0329   Sodium mmol/L 139 140 136   Potassium mmol/L 3.7 4.2 4.0   Chloride mmol/L 100 101 100   CO2 mmol/L 30* 29 27   Glucose mg/dL 123* 79 89   BUN  "mg/dL 17 20 25*   Creatinine mg/dL 1.5* 1.5* 2.0*   Albumin g/dL 3.1* 3.0* 3.1*   Total Bilirubin mg/dL 0.3 0.4 0.3   Alkaline Phosphatase U/L 71 72 72   AST U/L 78* 74* 72*   ALT U/L 59* 57* 53*   Magnesium mg/dL 2.0 2.1 2.2   Phosphorus mg/dL 5.4* 5.3* 5.0*       Drug levels (last 3 results):  No results for input(s): "VANCOMYCINRA", "VANCORANDOM", "VANCOMYCINPE", "VANCOPEAK", "VANCOMYCINTR", "VANCOTROUGH" in the last 72 hours.    Microbiologic Results:  Microbiology Results (last 7 days)       ** No results found for the last 168 hours. **            "

## 2024-08-14 NOTE — PROGRESS NOTES
Per previous notes, patient was off of Eliquis d/t insurance reasons. It appears the plan was to lovenox at discharge. A price check was performed with bedside pharmacy and the one month cost of lovenox would be around $114. For comparison, a one month supply of Eliquis is about $61. Unfortunately, the patient has Part D and would not be able to utilize  help. Additionally, the patient has already used the one month free trial.    Bubba Barriga, PharmD, BCPS  Ext. [phone]

## 2024-08-15 LAB
ALBUMIN SERPL BCP-MCNC: 3.3 G/DL (ref 3.5–5.2)
ALP SERPL-CCNC: 84 U/L (ref 55–135)
ALT SERPL W/O P-5'-P-CCNC: 58 U/L (ref 10–44)
ANION GAP SERPL CALC-SCNC: 11 MMOL/L (ref 8–16)
APTT PPP: 33.1 SEC (ref 21–32)
APTT PPP: 51.3 SEC (ref 21–32)
APTT PPP: 61.5 SEC (ref 21–32)
AST SERPL-CCNC: 78 U/L (ref 10–40)
BASOPHILS # BLD AUTO: 0.06 K/UL (ref 0–0.2)
BASOPHILS # BLD AUTO: 0.06 K/UL (ref 0–0.2)
BASOPHILS NFR BLD: 1 % (ref 0–1.9)
BASOPHILS NFR BLD: 1 % (ref 0–1.9)
BILIRUB SERPL-MCNC: 0.4 MG/DL (ref 0.1–1)
BUN SERPL-MCNC: 24 MG/DL (ref 6–20)
CALCIUM SERPL-MCNC: 9.7 MG/DL (ref 8.7–10.5)
CHLORIDE SERPL-SCNC: 104 MMOL/L (ref 95–110)
CO2 SERPL-SCNC: 24 MMOL/L (ref 23–29)
CREAT SERPL-MCNC: 1.8 MG/DL (ref 0.5–1.4)
DIFFERENTIAL METHOD BLD: ABNORMAL
DIFFERENTIAL METHOD BLD: ABNORMAL
EOSINOPHIL # BLD AUTO: 0.4 K/UL (ref 0–0.5)
EOSINOPHIL # BLD AUTO: 0.4 K/UL (ref 0–0.5)
EOSINOPHIL NFR BLD: 5.9 % (ref 0–8)
EOSINOPHIL NFR BLD: 5.9 % (ref 0–8)
ERYTHROCYTE [DISTWIDTH] IN BLOOD BY AUTOMATED COUNT: 22.4 % (ref 11.5–14.5)
ERYTHROCYTE [DISTWIDTH] IN BLOOD BY AUTOMATED COUNT: 22.4 % (ref 11.5–14.5)
EST. GFR  (NO RACE VARIABLE): 34.8 ML/MIN/1.73 M^2
GLUCOSE SERPL-MCNC: 59 MG/DL (ref 70–110)
HCT VFR BLD AUTO: 33.6 % (ref 37–48.5)
HCT VFR BLD AUTO: 33.6 % (ref 37–48.5)
HGB BLD-MCNC: 11 G/DL (ref 12–16)
HGB BLD-MCNC: 11 G/DL (ref 12–16)
IMM GRANULOCYTES # BLD AUTO: 0.04 K/UL (ref 0–0.04)
IMM GRANULOCYTES # BLD AUTO: 0.04 K/UL (ref 0–0.04)
IMM GRANULOCYTES NFR BLD AUTO: 0.7 % (ref 0–0.5)
IMM GRANULOCYTES NFR BLD AUTO: 0.7 % (ref 0–0.5)
LYMPHOCYTES # BLD AUTO: 2.5 K/UL (ref 1–4.8)
LYMPHOCYTES # BLD AUTO: 2.5 K/UL (ref 1–4.8)
LYMPHOCYTES NFR BLD: 42.7 % (ref 18–48)
LYMPHOCYTES NFR BLD: 42.7 % (ref 18–48)
MAGNESIUM SERPL-MCNC: 2.3 MG/DL (ref 1.6–2.6)
MCH RBC QN AUTO: 25.6 PG (ref 27–31)
MCH RBC QN AUTO: 25.6 PG (ref 27–31)
MCHC RBC AUTO-ENTMCNC: 32.7 G/DL (ref 32–36)
MCHC RBC AUTO-ENTMCNC: 32.7 G/DL (ref 32–36)
MCV RBC AUTO: 78 FL (ref 82–98)
MCV RBC AUTO: 78 FL (ref 82–98)
MONOCYTES # BLD AUTO: 1 K/UL (ref 0.3–1)
MONOCYTES # BLD AUTO: 1 K/UL (ref 0.3–1)
MONOCYTES NFR BLD: 16.2 % (ref 4–15)
MONOCYTES NFR BLD: 16.2 % (ref 4–15)
NEUTROPHILS # BLD AUTO: 2 K/UL (ref 1.8–7.7)
NEUTROPHILS # BLD AUTO: 2 K/UL (ref 1.8–7.7)
NEUTROPHILS NFR BLD: 33.5 % (ref 38–73)
NEUTROPHILS NFR BLD: 33.5 % (ref 38–73)
NRBC BLD-RTO: 1 /100 WBC
NRBC BLD-RTO: 1 /100 WBC
PHOSPHATE SERPL-MCNC: 4.6 MG/DL (ref 2.7–4.5)
PLATELET # BLD AUTO: 171 K/UL (ref 150–450)
PLATELET # BLD AUTO: 171 K/UL (ref 150–450)
PMV BLD AUTO: 11.9 FL (ref 9.2–12.9)
PMV BLD AUTO: 11.9 FL (ref 9.2–12.9)
POTASSIUM SERPL-SCNC: 4.6 MMOL/L (ref 3.5–5.1)
PROT SERPL-MCNC: 7.2 G/DL (ref 6–8.4)
RBC # BLD AUTO: 4.3 M/UL (ref 4–5.4)
RBC # BLD AUTO: 4.3 M/UL (ref 4–5.4)
SODIUM SERPL-SCNC: 139 MMOL/L (ref 136–145)
VANCOMYCIN SERPL-MCNC: 12.5 UG/ML
WBC # BLD AUTO: 5.93 K/UL (ref 3.9–12.7)
WBC # BLD AUTO: 5.93 K/UL (ref 3.9–12.7)

## 2024-08-15 PROCEDURE — 84100 ASSAY OF PHOSPHORUS: CPT | Performed by: STUDENT IN AN ORGANIZED HEALTH CARE EDUCATION/TRAINING PROGRAM

## 2024-08-15 PROCEDURE — 25000003 PHARM REV CODE 250: Performed by: STUDENT IN AN ORGANIZED HEALTH CARE EDUCATION/TRAINING PROGRAM

## 2024-08-15 PROCEDURE — 83735 ASSAY OF MAGNESIUM: CPT | Performed by: STUDENT IN AN ORGANIZED HEALTH CARE EDUCATION/TRAINING PROGRAM

## 2024-08-15 PROCEDURE — 25000003 PHARM REV CODE 250: Performed by: INTERNAL MEDICINE

## 2024-08-15 PROCEDURE — 25000003 PHARM REV CODE 250

## 2024-08-15 PROCEDURE — 85730 THROMBOPLASTIN TIME PARTIAL: CPT | Performed by: STUDENT IN AN ORGANIZED HEALTH CARE EDUCATION/TRAINING PROGRAM

## 2024-08-15 PROCEDURE — 80202 ASSAY OF VANCOMYCIN: CPT | Performed by: INTERNAL MEDICINE

## 2024-08-15 PROCEDURE — 85025 COMPLETE CBC W/AUTO DIFF WBC: CPT | Performed by: STUDENT IN AN ORGANIZED HEALTH CARE EDUCATION/TRAINING PROGRAM

## 2024-08-15 PROCEDURE — 11000001 HC ACUTE MED/SURG PRIVATE ROOM

## 2024-08-15 PROCEDURE — 63600175 PHARM REV CODE 636 W HCPCS: Performed by: STUDENT IN AN ORGANIZED HEALTH CARE EDUCATION/TRAINING PROGRAM

## 2024-08-15 PROCEDURE — 85730 THROMBOPLASTIN TIME PARTIAL: CPT | Mod: 91 | Performed by: PHYSICIAN ASSISTANT

## 2024-08-15 PROCEDURE — 36415 COLL VENOUS BLD VENIPUNCTURE: CPT | Performed by: STUDENT IN AN ORGANIZED HEALTH CARE EDUCATION/TRAINING PROGRAM

## 2024-08-15 PROCEDURE — 63600175 PHARM REV CODE 636 W HCPCS: Performed by: INTERNAL MEDICINE

## 2024-08-15 PROCEDURE — 80053 COMPREHEN METABOLIC PANEL: CPT | Performed by: STUDENT IN AN ORGANIZED HEALTH CARE EDUCATION/TRAINING PROGRAM

## 2024-08-15 RX ORDER — DIPHENHYDRAMINE HYDROCHLORIDE 50 MG/ML
25 INJECTION INTRAMUSCULAR; INTRAVENOUS ONCE
Status: COMPLETED | OUTPATIENT
Start: 2024-08-15 | End: 2024-08-15

## 2024-08-15 RX ORDER — HYDROMORPHONE HYDROCHLORIDE 2 MG/ML
3 INJECTION, SOLUTION INTRAMUSCULAR; INTRAVENOUS; SUBCUTANEOUS ONCE
Status: COMPLETED | OUTPATIENT
Start: 2024-08-15 | End: 2024-08-15

## 2024-08-15 RX ORDER — CEPHALEXIN 500 MG/1
500 CAPSULE ORAL EVERY 6 HOURS
Status: DISCONTINUED | OUTPATIENT
Start: 2024-08-15 | End: 2024-08-16 | Stop reason: HOSPADM

## 2024-08-15 RX ORDER — ENOXAPARIN SODIUM 150 MG/ML
110 INJECTION SUBCUTANEOUS 2 TIMES DAILY
Qty: 48 ML | Refills: 2 | Status: SHIPPED | OUTPATIENT
Start: 2024-08-15 | End: 2024-08-16 | Stop reason: HOSPADM

## 2024-08-15 RX ADMIN — FLUOXETINE HYDROCHLORIDE 40 MG: 20 CAPSULE ORAL at 08:08

## 2024-08-15 RX ADMIN — MORPHINE SULFATE 15 MG: 15 TABLET, EXTENDED RELEASE ORAL at 05:08

## 2024-08-15 RX ADMIN — DIPHENHYDRAMINE HYDROCHLORIDE 50 MG: 50 INJECTION, SOLUTION INTRAMUSCULAR; INTRAVENOUS at 03:08

## 2024-08-15 RX ADMIN — OXYCODONE HYDROCHLORIDE 20 MG: 10 TABLET ORAL at 08:08

## 2024-08-15 RX ADMIN — OXYCODONE HYDROCHLORIDE 20 MG: 10 TABLET ORAL at 06:08

## 2024-08-15 RX ADMIN — TIZANIDINE 4 MG: 2 TABLET ORAL at 08:08

## 2024-08-15 RX ADMIN — FOLIC ACID 1 MG: 1 TABLET ORAL at 09:08

## 2024-08-15 RX ADMIN — OXYCODONE HYDROCHLORIDE 20 MG: 10 TABLET ORAL at 09:08

## 2024-08-15 RX ADMIN — LACTULOSE 20 G: 20 SOLUTION ORAL at 08:08

## 2024-08-15 RX ADMIN — HYDROMORPHONE HYDROCHLORIDE 2 MG: 2 INJECTION INTRAMUSCULAR; INTRAVENOUS; SUBCUTANEOUS at 04:08

## 2024-08-15 RX ADMIN — HEPARIN SODIUM 18 UNITS/KG/HR: 10000 INJECTION, SOLUTION INTRAVENOUS at 05:08

## 2024-08-15 RX ADMIN — HYDROMORPHONE HYDROCHLORIDE 2 MG: 2 INJECTION INTRAMUSCULAR; INTRAVENOUS; SUBCUTANEOUS at 08:08

## 2024-08-15 RX ADMIN — HYDROMORPHONE HYDROCHLORIDE 2 MG: 2 INJECTION INTRAMUSCULAR; INTRAVENOUS; SUBCUTANEOUS at 03:08

## 2024-08-15 RX ADMIN — SODIUM CHLORIDE: 9 INJECTION, SOLUTION INTRAVENOUS at 02:08

## 2024-08-15 RX ADMIN — AMLODIPINE BESYLATE 10 MG: 10 TABLET ORAL at 09:08

## 2024-08-15 RX ADMIN — DIPHENHYDRAMINE HYDROCHLORIDE 25 MG: 50 INJECTION, SOLUTION INTRAMUSCULAR; INTRAVENOUS at 11:08

## 2024-08-15 RX ADMIN — DIPHENHYDRAMINE HYDROCHLORIDE 50 MG: 50 INJECTION, SOLUTION INTRAMUSCULAR; INTRAVENOUS at 10:08

## 2024-08-15 RX ADMIN — CARVEDILOL 25 MG: 25 TABLET, FILM COATED ORAL at 08:08

## 2024-08-15 RX ADMIN — CEPHALEXIN 500 MG: 500 CAPSULE ORAL at 02:08

## 2024-08-15 RX ADMIN — CEPHALEXIN 500 MG: 500 CAPSULE ORAL at 08:08

## 2024-08-15 RX ADMIN — GABAPENTIN 600 MG: 300 CAPSULE ORAL at 08:08

## 2024-08-15 RX ADMIN — HYDROMORPHONE HYDROCHLORIDE 2 MG: 2 INJECTION INTRAMUSCULAR; INTRAVENOUS; SUBCUTANEOUS at 09:08

## 2024-08-15 RX ADMIN — TRAZODONE HYDROCHLORIDE 50 MG: 50 TABLET ORAL at 08:08

## 2024-08-15 RX ADMIN — HYDROMORPHONE HYDROCHLORIDE 3 MG: 2 INJECTION INTRAMUSCULAR; INTRAVENOUS; SUBCUTANEOUS at 11:08

## 2024-08-15 RX ADMIN — LACTULOSE 20 G: 20 SOLUTION ORAL at 06:08

## 2024-08-15 RX ADMIN — MORPHINE SULFATE 15 MG: 15 TABLET, EXTENDED RELEASE ORAL at 02:08

## 2024-08-15 RX ADMIN — MORPHINE SULFATE 15 MG: 15 TABLET, EXTENDED RELEASE ORAL at 11:08

## 2024-08-15 RX ADMIN — DIPHENHYDRAMINE HYDROCHLORIDE 50 MG: 50 INJECTION, SOLUTION INTRAMUSCULAR; INTRAVENOUS at 04:08

## 2024-08-15 RX ADMIN — SENNOSIDES 8.6 MG: 8.6 TABLET, FILM COATED ORAL at 08:08

## 2024-08-15 RX ADMIN — TIZANIDINE 4 MG: 2 TABLET ORAL at 06:08

## 2024-08-15 RX ADMIN — PROCHLORPERAZINE EDISYLATE 2.5 MG: 5 INJECTION INTRAMUSCULAR; INTRAVENOUS at 04:08

## 2024-08-15 NOTE — PLAN OF CARE
Problem: Adult Inpatient Plan of Care  Goal: Plan of Care Review  Outcome: Progressing  Goal: Patient-Specific Goal (Individualized)  Outcome: Progressing  Goal: Absence of Hospital-Acquired Illness or Injury  Outcome: Progressing  Goal: Optimal Comfort and Wellbeing  Outcome: Progressing  Goal: Readiness for Transition of Care  Outcome: Progressing   Pt receiving fluids and hep gtt as ordered. Pt receiving pain meds prn as ordered.

## 2024-08-15 NOTE — SUBJECTIVE & OBJECTIVE
Interval History:   No events overnight. Patient with continue pain at chest port site. Erythema at left port site improving. Vanc switch to keflex. Ongoing pain control with IV pain meds. Creatinine improving.       Review of Systems   Constitutional:  Negative for activity change, appetite change, chills, diaphoresis, fatigue and fever.   HENT:  Negative for rhinorrhea and sore throat.    Respiratory:  Negative for cough, chest tightness and shortness of breath.    Cardiovascular:  Negative for chest pain, palpitations and leg swelling.   Gastrointestinal:  Negative for abdominal pain, constipation, diarrhea and nausea.   Endocrine: Negative for cold intolerance.   Genitourinary:  Negative for decreased urine volume and dysuria.   Musculoskeletal:  Positive for arthralgias (left chest, port site) and myalgias.   Skin:  Negative for rash and wound.   Neurological:  Negative for dizziness, weakness, numbness and headaches.   Psychiatric/Behavioral:  Negative for agitation, behavioral problems and confusion. The patient is nervous/anxious.      Objective:     Vital Signs (Most Recent):  Temp: 99.1 °F (37.3 °C) (08/15/24 1227)  Pulse: 85 (08/15/24 1227)  Resp: 17 (08/15/24 1402)  BP: 105/65 (08/15/24 1227)  SpO2: 97 % (08/15/24 0810) Vital Signs (24h Range):  Temp:  [98.5 °F (36.9 °C)-99.8 °F (37.7 °C)] 99.1 °F (37.3 °C)  Pulse:  [60-85] 85  Resp:  [16-19] 17  SpO2:  [96 %-98 %] 97 %  BP: (101-129)/(64-82) 105/65     Weight: 109.7 kg (241 lb 13.5 oz)  Body mass index is 44.23 kg/m².    Intake/Output Summary (Last 24 hours) at 8/15/2024 1606  Last data filed at 8/15/2024 0822  Gross per 24 hour   Intake --   Output 2600 ml   Net -2600 ml         Physical Exam  Constitutional:       General: She is not in acute distress.     Appearance: Normal appearance. She is obese.   HENT:      Head: Normocephalic and atraumatic.      Mouth/Throat:      Mouth: Mucous membranes are moist.   Eyes:      Extraocular Movements:  Extraocular movements intact.      Conjunctiva/sclera: Conjunctivae normal.   Cardiovascular:      Rate and Rhythm: Normal rate and regular rhythm.   Pulmonary:      Effort: Pulmonary effort is normal. No respiratory distress.      Breath sounds: Normal breath sounds. No wheezing or rales.   Abdominal:      General: Abdomen is flat. Bowel sounds are normal.      Palpations: Abdomen is soft.      Tenderness: There is no abdominal tenderness. There is no guarding.   Musculoskeletal:         General: Swelling (left arm) present. No tenderness.   Skin:     Findings: No rash.      Comments: Left port site with mild erythema    Neurological:      General: No focal deficit present.      Mental Status: She is alert and oriented to person, place, and time. Mental status is at baseline.   Psychiatric:         Mood and Affect: Mood normal.         Behavior: Behavior normal.             Significant Labs: All pertinent labs within the past 24 hours have been reviewed.  CBC:   Recent Labs   Lab 08/14/24  0330 08/15/24  0510   WBC 7.04  7.04 5.93  5.93   HGB 9.0*  9.0* 11.0*  11.0*   HCT 28.3*  28.3* 33.6*  33.6*     230 171  171     CMP:   Recent Labs   Lab 08/14/24  0329 08/15/24  0510    139   K 4.0 4.6    104   CO2 27 24   GLU 89 59*   BUN 25* 24*   CREATININE 2.0* 1.8*   CALCIUM 9.1 9.7   PROT 6.3 7.2   ALBUMIN 3.1* 3.3*   BILITOT 0.3 0.4   ALKPHOS 72 84   AST 72* 78*   ALT 53* 58*   ANIONGAP 9 11       Significant Imaging: I have reviewed all pertinent imaging results/findings within the past 24 hours.

## 2024-08-15 NOTE — PROGRESS NOTES
[facility] - [facility] ([place] [address])  [facility] Medicine  Progress Note    Patient Name: Nazanin Malone  MRN: [id number]  Patient Class: IP- Inpatient   Admission Date: 7/28/2024  Length of Stay: 18 days  Attending Physician: Lenin Steward MD  Primary Care Provider: Pee Montgomery MD        Subjective:     Principal Problem:Sickle cell pain crisis        HPI:  Nazanin Malone is a 44 year old female with history of SC anemia with history of acute chest, beta thalassemia, HTN, obesity, iron deficiency anemia, hx of PE on apixaban, RLE fasciotomy, and recent hospitalization for bacteremia s/p antibiotic course and recent sickle cell pain crisis that presents with sickle cell pain crisis. Patient reports recurrent pain consistent with her prior sickle cell pain crisis. Her pain started 2-3 days ago which started mostly in her left chest. Pain was constant without relief despite taking her home opiate regimen. Yesterday, she noted pain in her left arm and some shortness of breath. She reports a cough with yellow sputum and felt like she was oscillating between feeling hot and cold as well as low appetite associated with nausea. Denies F/C, abdominal pain, constipation.    Of note, had recent hospitalization for staph epidermis bacteremia s/p 14 day course of IV vancomycin.    Upon arrival to the ED, patient afebrile, HDS, and saturating well on room air. Labs notable for WBC 9, Hgb 11.5 (higher than baseline), K 3.2, trop negative, BNP WNL, blood cultures x2 sent. EKG without ischemic changes. CXR without consolidation.     Overview/Hospital Course:  Patient admitted for sickle cell pain crisis. Infectious work up negative. Started on IV pain medication PRN and IVF. US of left cath/chest obtained due to superficial pain around port. US without fluid collection or mass and pain believed to be secondary to sickle cell pain crisis.  Patient with continued in the left chest and upper extremity.  Left arm with  partial DVT in left IJ.  Anticoagulation switch from apixaban to heparin drip.  IV fluids discontinued given concern for swelling and diuresed given edema.  Bilateral lower extremity ultrasound and CT chest negative for PE/DVT.  Hematology consulted, recommended extended transfusion like she has undergone previously during her sickle cell care in [place] prior to moving to [place].    Transfusion medicine consulted. Plan for exchange transfusion with 6u pRBC 8/8. General surgery consulted for left port removal; since port flushes and acute thrombosis is not an indication for port removal, general surgery signed off.    Patient with new acute left lower extremity weakness on 8/8. Stroke code called and CTA and brain MRI unremarkable. Patient had complete recovery and ambulating well. Left chest port removed by general surgery given continued pain. Three days post removal, patient had acute swelling where left chest port was. General surgery assessed wound and concerned for hematoma and heparin gtt held 4 hours and pressure dressing applied and started on bactrim out of caution. Developed erythema and bactrim transitioned to Vanc for developing cellulitis. General surgery to follow for need of possible draining.     Patient developed FLOR 1.5 -> 2.0 after initiation of bactrim; likely pseuo-elevation in setting of bactrim and continues to have good UOP.     Interval History:   No events overnight. Patient with continue pain at chest port site. Erythema at left port site improving. Vanc switch to keflex. Ongoing pain control with IV pain meds. Creatinine improving.       Review of Systems   Constitutional:  Negative for activity change, appetite change, chills, diaphoresis, fatigue and fever.   HENT:  Negative for rhinorrhea and sore throat.    Respiratory:  Negative for cough, chest tightness and shortness of breath.    Cardiovascular:  Negative for chest pain, palpitations and leg swelling.   Gastrointestinal:   Negative for abdominal pain, constipation, diarrhea and nausea.   Endocrine: Negative for cold intolerance.   Genitourinary:  Negative for decreased urine volume and dysuria.   Musculoskeletal:  Positive for arthralgias (left chest, port site) and myalgias.   Skin:  Negative for rash and wound.   Neurological:  Negative for dizziness, weakness, numbness and headaches.   Psychiatric/Behavioral:  Negative for agitation, behavioral problems and confusion. The patient is nervous/anxious.      Objective:     Vital Signs (Most Recent):  Temp: 99.1 °F (37.3 °C) (08/15/24 1227)  Pulse: 85 (08/15/24 1227)  Resp: 17 (08/15/24 1402)  BP: 105/65 (08/15/24 1227)  SpO2: 97 % (08/15/24 0810) Vital Signs (24h Range):  Temp:  [98.5 °F (36.9 °C)-99.8 °F (37.7 °C)] 99.1 °F (37.3 °C)  Pulse:  [60-85] 85  Resp:  [16-19] 17  SpO2:  [96 %-98 %] 97 %  BP: (101-129)/(64-82) 105/65     Weight: 109.7 kg (241 lb 13.5 oz)  Body mass index is 44.23 kg/m².    Intake/Output Summary (Last 24 hours) at 8/15/2024 1606  Last data filed at 8/15/2024 0822  Gross per 24 hour   Intake --   Output 2600 ml   Net -2600 ml         Physical Exam  Constitutional:       General: She is not in acute distress.     Appearance: Normal appearance. She is obese.   HENT:      Head: Normocephalic and atraumatic.      Mouth/Throat:      Mouth: Mucous membranes are moist.   Eyes:      Extraocular Movements: Extraocular movements intact.      Conjunctiva/sclera: Conjunctivae normal.   Cardiovascular:      Rate and Rhythm: Normal rate and regular rhythm.   Pulmonary:      Effort: Pulmonary effort is normal. No respiratory distress.      Breath sounds: Normal breath sounds. No wheezing or rales.   Abdominal:      General: Abdomen is flat. Bowel sounds are normal.      Palpations: Abdomen is soft.      Tenderness: There is no abdominal tenderness. There is no guarding.   Musculoskeletal:         General: Swelling (left arm) present. No tenderness.   Skin:     Findings: No  rash.      Comments: Left port site with mild erythema    Neurological:      General: No focal deficit present.      Mental Status: She is alert and oriented to person, place, and time. Mental status is at baseline.   Psychiatric:         Mood and Affect: Mood normal.         Behavior: Behavior normal.             Significant Labs: All pertinent labs within the past 24 hours have been reviewed.  CBC:   Recent Labs   Lab 08/14/24  0330 08/15/24  0510   WBC 7.04  7.04 5.93  5.93   HGB 9.0*  9.0* 11.0*  11.0*   HCT 28.3*  28.3* 33.6*  33.6*     230 171  171     CMP:   Recent Labs   Lab 08/14/24  0329 08/15/24  0510    139   K 4.0 4.6    104   CO2 27 24   GLU 89 59*   BUN 25* 24*   CREATININE 2.0* 1.8*   CALCIUM 9.1 9.7   PROT 6.3 7.2   ALBUMIN 3.1* 3.3*   BILITOT 0.3 0.4   ALKPHOS 72 84   AST 72* 78*   ALT 53* 58*   ANIONGAP 9 11       Significant Imaging: I have reviewed all pertinent imaging results/findings within the past 24 hours.    Assessment/Plan:      * Sickle cell pain crisis  Hb-SS disease with vaso-occlusive pain  Patient recently moved from [place] to [place] in June 2024. Patient reported receiving RBC exchanges every 6 week via two ports in her chest for management of her sickle cell disease. Has yet to establish care in [place].     - Hgb 11.6 on admit, at baseline  - Reticulocyte count 0.7  - Continue MS contin TID  and tizanidine PRN  - Gabapentin, APAP,  - Oxycodone 15mg PRN  - Dilaudid PRN; wean as able  - s/p IVF to prevent worsening sickling  - Hematology & transfusion medicine consulted  -- S/p exchange transfusion w/ assistance of transfusion medicine  - General Surgery consulted   -- Left port removed 8/10; tolerated well  - 8/13: swelling of left port site 3 days after removal; concerning for hematoma  - Hep gtt held 4 hours; pressure dressing applied.  - Weaning dilaudid; decreased to 2mg     FLOR (acute kidney injury)  - Cr elevated to 1.5, baseline 1.0  -  Likely 2/2 poor PO intake in setting of sickle cell crisis  - Improved with IVF  - Renally dosing all medications  - Avoiding nephrotoxins  - Will continue to monitor on daily labs  - Patient developed worsening FLOR Cr 1.5 -> 2.0 after initiation of bactrim; likely pseuo-elevation in setting of bactrim and continues to have good UOP.   - CTM daily; urine studies pending    Thrombosis of internal carotid, left  - US Left upper extremity 8/3 with partial deep vein thrombosis of the left internal jugular vein by the port tip   - BLE Dupplex ordered  - Discussed DVT while on Eliquis 5mg BID, and is considered treatment failure  - Transitioned to heparin drip  - CTA Chest without PE  - Hematology consulted  -- Continue hepatin drip  -- IV heparin while IP (keeping on heparin given may need lancing of wound infection as above) and transition to treatment dose lovenox for discharge    Left-sided weakness  - Resolved; likely 2/2 too much anti-histamines (atarax/benadryl)  - CTA/Brain MRI unremarkable  - Resolved      Hypokalemia  Patient's most recent potassium results are listed below.   Recent Labs     08/04/24  0432 08/05/24  0210 08/06/24  0520   K 3.9 4.0 3.9       Plan  - Replete potassium per protocol  - Monitor potassium Daily  - Patient's hypokalemia is stable    History of pulmonary embolism  See above    Hypertension  Chronic, controlled. Latest blood pressure and vitals reviewed-     Temp:  [99.1 °F (37.3 °C)]   Pulse:  [86-92]   Resp:  [20]   BP: (121-157)/(82-94)   SpO2:  [97 %-100 %] .   Home meds for hypertension were reviewed and noted below.   Hypertension Medications               amLODIPine (NORVASC) 10 MG tablet Take 1 tablet (10 mg total) by mouth once daily.    carvediloL (COREG) 25 MG tablet Take 1 tablet (25 mg total) by mouth 2 (two) times daily.            While in the hospital, will manage blood pressure as follows; Continue home antihypertensive regimen    Will utilize p.r.n. blood pressure  medication only if patient's blood pressure greater than 180/110 and she develops symptoms such as worsening chest pain or shortness of breath.      VTE Risk Mitigation (From admission, onward)           Ordered     heparin 25,000 units in dextrose 5% 250 mL (100 units/mL) infusion HIGH INTENSITY nomogram - [facility]  Continuous        Question:  Begin at (units/kg/hr)  Answer:  18    08/10/24 0934     heparin 25,000 units in dextrose 5% (100 units/ml) IV bolus from bag HIGH INTENSITY nomogram - [facility]  As needed (PRN)        Question:  Heparin Infusion Adjustment (DO NOT MODIFY ANSWER)  Answer:  \[email or web address]\[email or web address]\Images\Pharmacy\HeparinInfusions\heparin HIGH INTENSITY nomogram for [facility] [id number]    08/09/24 0358     heparin 25,000 units in dextrose 5% (100 units/ml) IV bolus from bag HIGH INTENSITY nomogram - [facility]  As needed (PRN)        Question:  Heparin Infusion Adjustment (DO NOT MODIFY ANSWER)  Answer:  \\[email or web address]\epic\Images\Pharmacy\HeparinInfusions\heparin HIGH INTENSITY nomogram for [facility] [id number]    08/09/24 0358     Reason for No Pharmacological VTE Prophylaxis  Once        Question:  Reasons:  Answer:  Already adequately anticoagulated on oral Anticoagulants    07/28/24 1117     IP VTE HIGH RISK PATIENT  Once         07/28/24 1117                    Discharge Planning   ALYSE: 8/17/2024     Code Status: Full Code   Is the patient medically ready for discharge?:     Reason for patient still in hospital (select all that apply): Patient trending condition, Laboratory test, Treatment, and Pending disposition  Discharge Plan A: Home   Discharge Delays: None known at this time              Lenin Steward MD  Department of Hospital Medicine   [facility] Surg ([place]-16)

## 2024-08-15 NOTE — ASSESSMENT & PLAN NOTE
Hb-SS disease with vaso-occlusive pain  Patient recently moved from [place] to [place] in June 2024. Patient reported receiving RBC exchanges every 6 week via two ports in her chest for management of her sickle cell disease. Has yet to establish care in [place].     - Hgb 11.6 on admit, at baseline  - Reticulocyte count 0.7  - Continue MS contin TID  and tizanidine PRN  - Gabapentin, APAP,  - Oxycodone 15mg PRN  - Dilaudid PRN; wean as able  - s/p IVF to prevent worsening sickling  - Hematology & transfusion medicine consulted  -- S/p exchange transfusion w/ assistance of transfusion medicine  - General Surgery consulted   -- Left port removed 8/10; tolerated well  - 8/13: swelling of left port site 3 days after removal; concerning for hematoma  - Hep gtt held 4 hours; pressure dressing applied.  - Weaning dilaudid; decreased to 2mg

## 2024-08-16 ENCOUNTER — ANTI-COAG VISIT (OUTPATIENT)
Dept: CARDIOLOGY | Facility: CLINIC | Age: 44
End: 2024-08-16
Payer: MEDICARE

## 2024-08-16 VITALS
RESPIRATION RATE: 18 BRPM | DIASTOLIC BLOOD PRESSURE: 84 MMHG | WEIGHT: 241.88 LBS | SYSTOLIC BLOOD PRESSURE: 126 MMHG | TEMPERATURE: 99 F | HEIGHT: 62 IN | OXYGEN SATURATION: 100 % | HEART RATE: 75 BPM | BODY MASS INDEX: 44.51 KG/M2

## 2024-08-16 DIAGNOSIS — Z79.01 LONG TERM (CURRENT) USE OF ANTICOAGULANTS: ICD-10-CM

## 2024-08-16 DIAGNOSIS — I65.22: Primary | ICD-10-CM

## 2024-08-16 LAB
ALBUMIN SERPL BCP-MCNC: 2.9 G/DL (ref 3.5–5.2)
ALP SERPL-CCNC: 70 U/L (ref 55–135)
ALT SERPL W/O P-5'-P-CCNC: 42 U/L (ref 10–44)
ANION GAP SERPL CALC-SCNC: 7 MMOL/L (ref 8–16)
APTT PPP: 56.7 SEC (ref 21–32)
AST SERPL-CCNC: 51 U/L (ref 10–40)
BASOPHILS # BLD AUTO: 0.03 K/UL (ref 0–0.2)
BASOPHILS NFR BLD: 0.5 % (ref 0–1.9)
BILIRUB SERPL-MCNC: 0.4 MG/DL (ref 0.1–1)
BUN SERPL-MCNC: 18 MG/DL (ref 6–20)
CALCIUM SERPL-MCNC: 9 MG/DL (ref 8.7–10.5)
CHLORIDE SERPL-SCNC: 106 MMOL/L (ref 95–110)
CO2 SERPL-SCNC: 24 MMOL/L (ref 23–29)
CREAT SERPL-MCNC: 1.4 MG/DL (ref 0.5–1.4)
DIFFERENTIAL METHOD BLD: ABNORMAL
EOSINOPHIL # BLD AUTO: 0.4 K/UL (ref 0–0.5)
EOSINOPHIL NFR BLD: 6.3 % (ref 0–8)
ERYTHROCYTE [DISTWIDTH] IN BLOOD BY AUTOMATED COUNT: 22.5 % (ref 11.5–14.5)
EST. GFR  (NO RACE VARIABLE): 47 ML/MIN/1.73 M^2
GLUCOSE SERPL-MCNC: 108 MG/DL (ref 70–110)
HCT VFR BLD AUTO: 28.2 % (ref 37–48.5)
HGB BLD-MCNC: 8.9 G/DL (ref 12–16)
IMM GRANULOCYTES # BLD AUTO: 0.01 K/UL (ref 0–0.04)
IMM GRANULOCYTES NFR BLD AUTO: 0.2 % (ref 0–0.5)
LYMPHOCYTES # BLD AUTO: 2.9 K/UL (ref 1–4.8)
LYMPHOCYTES NFR BLD: 45.8 % (ref 18–48)
MAGNESIUM SERPL-MCNC: 2.1 MG/DL (ref 1.6–2.6)
MCH RBC QN AUTO: 25.1 PG (ref 27–31)
MCHC RBC AUTO-ENTMCNC: 31.6 G/DL (ref 32–36)
MCV RBC AUTO: 79 FL (ref 82–98)
MONOCYTES # BLD AUTO: 1.2 K/UL (ref 0.3–1)
MONOCYTES NFR BLD: 18.5 % (ref 4–15)
NEUTROPHILS # BLD AUTO: 1.8 K/UL (ref 1.8–7.7)
NEUTROPHILS NFR BLD: 28.7 % (ref 38–73)
NRBC BLD-RTO: 1 /100 WBC
PHOSPHATE SERPL-MCNC: 4.2 MG/DL (ref 2.7–4.5)
PLATELET # BLD AUTO: 263 K/UL (ref 150–450)
PMV BLD AUTO: 10.1 FL (ref 9.2–12.9)
POTASSIUM SERPL-SCNC: 4.2 MMOL/L (ref 3.5–5.1)
PROT SERPL-MCNC: 6.1 G/DL (ref 6–8.4)
RBC # BLD AUTO: 3.55 M/UL (ref 4–5.4)
SODIUM SERPL-SCNC: 137 MMOL/L (ref 136–145)
WBC # BLD AUTO: 6.22 K/UL (ref 3.9–12.7)

## 2024-08-16 PROCEDURE — 25000003 PHARM REV CODE 250

## 2024-08-16 PROCEDURE — 36415 COLL VENOUS BLD VENIPUNCTURE: CPT | Performed by: PHYSICIAN ASSISTANT

## 2024-08-16 PROCEDURE — 84100 ASSAY OF PHOSPHORUS: CPT | Performed by: STUDENT IN AN ORGANIZED HEALTH CARE EDUCATION/TRAINING PROGRAM

## 2024-08-16 PROCEDURE — 63600175 PHARM REV CODE 636 W HCPCS: Performed by: INTERNAL MEDICINE

## 2024-08-16 PROCEDURE — 25000003 PHARM REV CODE 250: Performed by: STUDENT IN AN ORGANIZED HEALTH CARE EDUCATION/TRAINING PROGRAM

## 2024-08-16 PROCEDURE — 83735 ASSAY OF MAGNESIUM: CPT | Performed by: STUDENT IN AN ORGANIZED HEALTH CARE EDUCATION/TRAINING PROGRAM

## 2024-08-16 PROCEDURE — 85730 THROMBOPLASTIN TIME PARTIAL: CPT | Performed by: PHYSICIAN ASSISTANT

## 2024-08-16 PROCEDURE — 85025 COMPLETE CBC W/AUTO DIFF WBC: CPT | Performed by: INTERNAL MEDICINE

## 2024-08-16 PROCEDURE — 80053 COMPREHEN METABOLIC PANEL: CPT | Performed by: STUDENT IN AN ORGANIZED HEALTH CARE EDUCATION/TRAINING PROGRAM

## 2024-08-16 RX ORDER — CEPHALEXIN 500 MG/1
500 CAPSULE ORAL EVERY 6 HOURS
Qty: 20 CAPSULE | Refills: 0 | Status: ON HOLD | OUTPATIENT
Start: 2024-08-16 | End: 2024-08-21

## 2024-08-16 RX ORDER — WARFARIN SODIUM 5 MG/1
5 TABLET ORAL DAILY
Qty: 14 TABLET | Refills: 0 | Status: SHIPPED | OUTPATIENT
Start: 2024-08-16 | End: 2024-08-16 | Stop reason: HOSPADM

## 2024-08-16 RX ORDER — FUROSEMIDE 20 MG/1
20 TABLET ORAL DAILY
Qty: 7 TABLET | Refills: 0 | Status: ON HOLD | OUTPATIENT
Start: 2024-08-16 | End: 2024-08-23

## 2024-08-16 RX ORDER — MORPHINE SULFATE 15 MG/1
15 TABLET, FILM COATED, EXTENDED RELEASE ORAL 2 TIMES DAILY
Qty: 28 TABLET | Refills: 0 | Status: ON HOLD | OUTPATIENT
Start: 2024-08-16 | End: 2024-08-30

## 2024-08-16 RX ORDER — ENOXAPARIN SODIUM 150 MG/ML
110 INJECTION SUBCUTANEOUS 2 TIMES DAILY
Qty: 48 ML | Refills: 2 | Status: ON HOLD | OUTPATIENT
Start: 2024-08-16 | End: 2024-11-14

## 2024-08-16 RX ORDER — OXYCODONE AND ACETAMINOPHEN 10; 325 MG/1; MG/1
1 TABLET ORAL EVERY 6 HOURS PRN
Qty: 25 TABLET | Refills: 0 | Status: ON HOLD | OUTPATIENT
Start: 2024-08-16

## 2024-08-16 RX ORDER — TIZANIDINE 4 MG/1
4 TABLET ORAL EVERY 8 HOURS PRN
Qty: 30 TABLET | Refills: 0 | Status: ON HOLD | OUTPATIENT
Start: 2024-08-16 | End: 2024-08-26

## 2024-08-16 RX ORDER — LACTULOSE 10 G/15ML
20 SOLUTION ORAL; RECTAL EVERY 6 HOURS PRN
Qty: 300 ML | Refills: 0 | Status: ON HOLD | OUTPATIENT
Start: 2024-08-16

## 2024-08-16 RX ADMIN — HYDROMORPHONE HYDROCHLORIDE 2 MG: 2 INJECTION INTRAMUSCULAR; INTRAVENOUS; SUBCUTANEOUS at 01:08

## 2024-08-16 RX ADMIN — CEPHALEXIN 500 MG: 500 CAPSULE ORAL at 01:08

## 2024-08-16 RX ADMIN — HYDROMORPHONE HYDROCHLORIDE 2 MG: 2 INJECTION INTRAMUSCULAR; INTRAVENOUS; SUBCUTANEOUS at 05:08

## 2024-08-16 RX ADMIN — AMLODIPINE BESYLATE 10 MG: 10 TABLET ORAL at 08:08

## 2024-08-16 RX ADMIN — HYDROMORPHONE HYDROCHLORIDE 2 MG: 2 INJECTION INTRAMUSCULAR; INTRAVENOUS; SUBCUTANEOUS at 09:08

## 2024-08-16 RX ADMIN — OXYCODONE HYDROCHLORIDE 20 MG: 10 TABLET ORAL at 03:08

## 2024-08-16 RX ADMIN — CARVEDILOL 25 MG: 25 TABLET, FILM COATED ORAL at 08:08

## 2024-08-16 RX ADMIN — ACETAMINOPHEN 1000 MG: 325 TABLET ORAL at 08:08

## 2024-08-16 RX ADMIN — FLUOXETINE HYDROCHLORIDE 40 MG: 20 CAPSULE ORAL at 08:08

## 2024-08-16 RX ADMIN — FOLIC ACID 1 MG: 1 TABLET ORAL at 08:08

## 2024-08-16 RX ADMIN — DIPHENHYDRAMINE HYDROCHLORIDE 50 MG: 50 INJECTION, SOLUTION INTRAMUSCULAR; INTRAVENOUS at 09:08

## 2024-08-16 RX ADMIN — OXYCODONE HYDROCHLORIDE 20 MG: 10 TABLET ORAL at 08:08

## 2024-08-16 RX ADMIN — SENNOSIDES 8.6 MG: 8.6 TABLET, FILM COATED ORAL at 08:08

## 2024-08-16 RX ADMIN — MORPHINE SULFATE 15 MG: 15 TABLET, EXTENDED RELEASE ORAL at 05:08

## 2024-08-16 RX ADMIN — DIPHENHYDRAMINE HYDROCHLORIDE 50 MG: 50 INJECTION, SOLUTION INTRAMUSCULAR; INTRAVENOUS at 01:08

## 2024-08-16 RX ADMIN — CEPHALEXIN 500 MG: 500 CAPSULE ORAL at 08:08

## 2024-08-16 NOTE — PLAN OF CARE
DC orders in.  CM requested CHW team 1 to schedule PCP appointment.    10:24 AM  CM spoke with pt in room.  Dispo: home. She will drive herself.  No DME needed.  Pt requested hot packs; CM instructed that hot packs are not an item we are able to supply, she will need to purchase.  Pt v/u.  CM instructed in d/c process. Pt v/u.    MAYELA Martinez, BS, RN, CCM

## 2024-08-16 NOTE — PROGRESS NOTES
Patient referred during admit but discharged on LMWH only (confirmed with hospital team). We will close referral at this time.

## 2024-08-16 NOTE — PLAN OF CARE
[facility] ([facility]-16)  Discharge Final Note    Primary Care Provider: Pee Montgomery MD    Expected Discharge Date: 8/16/2024    Final Discharge Note (most recent)       Final Note - 08/16/24 1420          Final Note    Assessment Type Final Discharge Note (P)      Anticipated Discharge Disposition Home or Self Care (P)         Post-Acute Status    Coverage [employer] (P)      Discharge Delays None known at this time (P)                      Important Message from Medicare             Contact Info       Pee Montgomery MD   Specialty: Hematology and Oncology   Relationship: PCP - General    [facility]  [facility] [address]   Phone: [phone]       Next Steps: Follow up    Instructions: CHW called to schedule pcp f/u, [facility]        Pt d/c'd to home with drive from self.  Pt has PCP f/u visit pending - clinic to call pt to schedule appointment.    THERESA MartinezN, BS, RN, CCM

## 2024-08-16 NOTE — PLAN OF CARE
Problem: Adult Inpatient Plan of Care  Goal: Plan of Care Review  Outcome: Progressing  Goal: Patient-Specific Goal (Individualized)  Outcome: Progressing  Goal: Absence of Hospital-Acquired Illness or Injury  Outcome: Progressing  Goal: Optimal Comfort and Wellbeing  Outcome: Progressing  Goal: Readiness for Transition of Care  Outcome: Progressing     Problem: Acute Kidney Injury/Impairment  Goal: Fluid and Electrolyte Balance  Outcome: Progressing  Goal: Improved Oral Intake  Outcome: Progressing  Goal: Effective Renal Function  Outcome: Progressing     Problem: Fall Injury Risk  Goal: Absence of Fall and Fall-Related Injury  Outcome: Progressing     Problem: Sickle Cell Disease  Goal: Optimal Coping with Sickle Cell Disease  Outcome: Progressing  Goal: Effective Tissue Perfusion  Outcome: Progressing  Goal: Absence of Infection Signs and Symptoms  Outcome: Progressing  Goal: Optimal Pain Control and Function  Outcome: Progressing  Goal: Optimal Oxygenation  Outcome: Progressing     Problem: Thrombolytic Therapy  Goal: Absence of Bleeding  Outcome: Progressing  Goal: Unobstructed Breathing  Outcome: Progressing     Problem: Skin Injury Risk Increased  Goal: Skin Health and Integrity  Outcome: Progressing     Problem: Bariatric Environmental Safety  Goal: Safety Maintained with Care  Outcome: Progressing     Problem: Fluid Volume Excess  Goal: Fluid Balance  Outcome: Progressing     Problem: Wound  Goal: Optimal Coping  Outcome: Progressing  Goal: Optimal Functional Ability  Outcome: Progressing  Goal: Absence of Infection Signs and Symptoms  Outcome: Progressing  Goal: Improved Oral Intake  Outcome: Progressing  Goal: Optimal Pain Control and Function  Outcome: Progressing  Goal: Skin Health and Integrity  Outcome: Progressing  Goal: Optimal Wound Healing  Outcome: Progressing

## 2024-08-16 NOTE — PLAN OF CARE
Patient discharged home per MD orders/  Vs stable, pt afebrile and no c/o pain at this time. Reviewed discharge instructions, follow-up's and meds with patient. VU no PIV access noted during discharge process, pressure dressings intact to right port-a-cath site.  Ride to be provided by patient upon discharge and will transport per W/C    Problem: Adult Inpatient Plan of Care  Goal: Plan of Care Review  8/16/2024 1330 by Cyndie Bob RN  Outcome: Met  8/16/2024 1330 by Cyndie Bob RN  Outcome: Progressing  Goal: Patient-Specific Goal (Individualized)  8/16/2024 1330 by Cyndie Bob RN  Outcome: Met  8/16/2024 1330 by Cyndie Bob RN  Outcome: Progressing  Goal: Absence of Hospital-Acquired Illness or Injury  8/16/2024 1330 by Cyndie Bob RN  Outcome: Met  8/16/2024 1330 by Cyndie Bob RN  Outcome: Progressing  Goal: Optimal Comfort and Wellbeing  8/16/2024 1330 by Cyndie Bob RN  Outcome: Met  8/16/2024 1330 by Cyndie Bob RN  Outcome: Progressing  Goal: Readiness for Transition of Care  8/16/2024 1330 by Cyndie Bob RN  Outcome: Met  8/16/2024 1330 by Cyndie Bob RN  Outcome: Progressing     Problem: Acute Kidney Injury/Impairment  Goal: Fluid and Electrolyte Balance  8/16/2024 1330 by Cyndie Bob RN  Outcome: Met  8/16/2024 1330 by Cyndie Bob RN  Outcome: Progressing  Goal: Improved Oral Intake  8/16/2024 1330 by Cyndie Bob RN  Outcome: Met  8/16/2024 1330 by Cyndie Bob RN  Outcome: Progressing  Goal: Effective Renal Function  8/16/2024 1330 by Cyndie Bob RN  Outcome: Met  8/16/2024 1330 by Cyndie Bob RN  Outcome: Progressing

## 2024-08-17 NOTE — DISCHARGE SUMMARY
[facility] ([place] [address])  [facility] Medicine  Discharge Summary      Patient Name: Nazanin Malone  MRN: [id number]  VLADIMIR: [id number]  Patient Class: IP- Inpatient  Admission Date: 7/28/2024  Hospital Length of Stay: 19 days  Discharge Date and Time: 8/16/2024  1:40 PM  Attending Physician: Kezia att. providers found   Discharging Provider: Lenin Steward MD  Primary Care Provider: Pee Montgomery MD  [facility] Medicine Team: [facility] Lenin Steward MD  Primary Care Team: [facility]    HPI:   Nazanin Malone is a 44 year old female with history of SC anemia with history of acute chest, beta thalassemia, HTN, obesity, iron deficiency anemia, hx of PE on apixaban, RLE fasciotomy, and recent hospitalization for bacteremia s/p antibiotic course and recent sickle cell pain crisis that presents with sickle cell pain crisis. Patient reports recurrent pain consistent with her prior sickle cell pain crisis. Her pain started 2-3 days ago which started mostly in her left chest. Pain was constant without relief despite taking her home opiate regimen. Yesterday, she noted pain in her left arm and some shortness of breath. She reports a cough with yellow sputum and felt like she was oscillating between feeling hot and cold as well as low appetite associated with nausea. Denies F/C, abdominal pain, constipation.    Of note, had recent hospitalization for staph epidermis bacteremia s/p 14 day course of IV vancomycin.    Upon arrival to the ED, patient afebrile, HDS, and saturating well on room air. Labs notable for WBC 9, Hgb 11.5 (higher than baseline), K 3.2, trop negative, BNP WNL, blood cultures x2 sent. EKG without ischemic changes. CXR without consolidation.     * No surgery found *      Hospital Course:   Patient admitted for sickle cell pain crisis. Infectious work up negative. Started on IV pain medication PRN and IVF. US of left cath/chest obtained due to superficial pain around port. US  without fluid collection or mass and pain believed to be secondary to sickle cell pain crisis.  Patient with continued in the left chest and upper extremity.  Left arm with partial DVT in left IJ.  Anticoagulation switch from apixaban to heparin drip.  IV fluids discontinued given concern for swelling and diuresed given edema.  Bilateral lower extremity ultrasound and CT chest negative for PE/DVT.  Hematology consulted, recommended extended transfusion like she has undergone previously during her sickle cell care in [place] prior to moving to [place].    Transfusion medicine consulted. Plan for exchange transfusion with 6u pRBC 8/8. General surgery consulted for left port removal; since port flushes and acute thrombosis is not an indication for port removal, general surgery signed off.    Patient with new acute left lower extremity weakness on 8/8. Stroke code called and CTA and brain MRI unremarkable. Patient had complete recovery and ambulating well. Left chest port removed by general surgery given continued pain. Three days post removal, patient had acute swelling where left chest port was. General surgery assessed wound and concerned for hematoma and heparin gtt held 4 hours and pressure dressing applied and started on bactrim out of caution. Developed erythema and bactrim transitioned to Vanc for developing cellulitis. General surgery to follow for need of possible draining. Swelling improved.    Patient developed FLOR 1.5 -> 2.0 after initiation of bactrim; likely pseuo-elevation in setting of bactrim and continues to have good UOP. Renal function improved post bactrim discontinuation. To complete course of treatment with Keflex. Patient agreeable to Loveonx at discharge for AC for DVT. Primary care and heme/onc follow up requested. Pain controlled at discharge.    Patient deemed appropriate for discharge. I personally saw, examined, and evaluated patient prior to departure. Plan discussed with patient, who  was agreeable and amenable; medications were discussed and reviewed, outpatient follow-up scheduled, ER precautions were given, all questions were answered to the patient's satisfaction, and Nazanin Malone was subsequently discharged.       Goals of Care Treatment Preferences:  Code Status: Full Code         Consults:   Consults (From admission, onward)          Status Ordering Provider     Inpatient consult to PICC team (NIAS)  Once        Provider:  (Not yet assigned)    Completed SHANIQUE NICOLE     Inpatient consult to Vascular (Stroke) Neurology  Once        Provider:  (Not yet assigned)    Completed KRYSTIAN GUILLORY     Inpatient consult to General Surgery  Once        Provider:  (Not yet assigned)    Completed KRYSTIAN GUILLORY     Inpatient consult to Interventional Radiology  Once        Provider:  (Not yet assigned)    Completed SHANIQUE NICOLE     Inpatient consult to Hematology/Oncology  Once        Provider:  (Not yet assigned)    Completed SHANIQUE NICOLE     Inpatient consult to Spiritual Care  Once        Provider:  (Not yet assigned)    Completed KRYSTIAN GUILLORY            No new Assessment & Plan notes have been filed under this hospital service since the last note was generated.  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    Specialty:  Hematology and Oncology  Why: [facility] called to schedule pcp f/u, Violette sent message to nurse to call patient to jesu\[facility] [place] [facility]\""t.  Contact information:  [address]  [facility] [id number] [phone]                           Patient Instructions:      Ambulatory referral/consult to Internal Medicine   Standing Status: Future   Referral Priority: Routine Referral Type: Consultation   Referral Reason: Specialty Services Required   Requested Specialty: Internal Medicine   Number of Visits Requested: 1       Significant Diagnostic Studies: Labs: All labs within the past 24 hours have been reviewed    Pending Diagnostic Studies:       None           Medications:  Reconciled Home Medications:      Medication List        START taking these medications      cephALEXin 500 MG capsule  Commonly known as: KEFLEX  Take 1 capsule (500 mg total) by mouth every 6 (six) hours. for 5 days     CONSTULOSE 10 gram/15 mL solution  Generic drug: lactulose  Take 30 mLs (20 g total) by mouth every 6 (six) hours as needed (Constipation).     enoxaparin 120 mg/0.8 mL Syrg  Commonly known as: LOVENOX  Push out 0.1 ml then Inject 0.7 mLs (105 mg total) into the skin 2 (two) times daily.     furosemide 20 MG tablet  Commonly known as: LASIX  Take 1 tablet (20 mg total) by mouth once daily for 7 days.            CONTINUE taking these medications      acetaminophen 500 MG tablet  Commonly known as: TYLENOL  Take 1,000 mg by mouth daily as needed for Pain.     albuterol 90 mcg/actuation inhaler  Commonly known as: VENTOLIN HFA  Inhale 2 puffs into the lungs every 6 (six) hours as needed for Wheezing or Shortness of Breath. Rescue     amLODIPine 10 MG tablet  Commonly known as: NORVASC  Take 1 tablet (10 mg total) by mouth once daily.     carvediloL 25 MG tablet  Commonly known as: COREG  Take 1 tablet (25 mg total) by mouth 2 (two) times daily.     FLUoxetine 40 MG capsule  Take 1 capsule (40 mg total) by mouth once daily.     fluticasone  propionate 50 mcg/actuation nasal spray  Commonly known as: FLONASE  INSTILL 1 SPRAY INTO EACH NOSTRIL EVERY DAY     folic acid 1 MG tablet  Commonly known as: FOLVITE  Take 1 tablet (1 mg total) by mouth once daily.     morphine 15 MG 12 hr tablet  Commonly known as: MS CONTIN  Take 1 tablet (15 mg total) by mouth 2 (two) times daily. for 14 days     oxyCODONE-acetaminophen  mg per tablet  Commonly known as: PERCOCET  Take 1 tablet by mouth every 6 (six) hours as needed for Pain.     promethazine 25 MG tablet  Commonly known as: PHENERGAN  Take 1 tablet (25 mg total) by mouth every 6 (six) hours as needed for Nausea.     senna-docusate 8.6-50 mg 8.6-50 mg per tablet  Commonly known as: PERICOLACE  Take 1 tablet by mouth daily as needed for Constipation.     tiZANidine 4 MG tablet  Commonly known as: ZANAFLEX  Take 1 tablet (4 mg total) by mouth every 8 (eight) hours as needed.     VITAMIN C ORAL  Take 1 capsule by mouth once daily.            STOP taking these medications      ELIQUIS 5 mg Tab  Generic drug: apixaban              Indwelling Lines/Drains at time of discharge:   Lines/Drains/Airways       Central Venous Catheter Line  Duration             Port A Cath Single Lumen 07/28/24 0830 Subclavian Right 20 days                    Time spent on the discharge of patient: 35 minutes         Lenin Steward MD  Department of Hospital Medicine  [facility] Surg ([place])

## 2024-08-22 ENCOUNTER — HOSPITAL ENCOUNTER (INPATIENT)
Facility: HOSPITAL | Age: 44
LOS: 8 days | Discharge: HOME | DRG: 812 | End: 2024-08-31
Attending: EMERGENCY MEDICINE | Admitting: FAMILY MEDICINE
Payer: MEDICARE

## 2024-08-22 DIAGNOSIS — R11.2 NAUSEA AND VOMITING, UNSPECIFIED VOMITING TYPE: ICD-10-CM

## 2024-08-22 DIAGNOSIS — M79.606 LEG PAIN: ICD-10-CM

## 2024-08-22 DIAGNOSIS — R07.9 CHEST PAIN: ICD-10-CM

## 2024-08-22 DIAGNOSIS — R10.9 ABDOMINAL PAIN: ICD-10-CM

## 2024-08-22 DIAGNOSIS — R10.9 ABDOMINAL PAIN, UNSPECIFIED ABDOMINAL LOCATION: ICD-10-CM

## 2024-08-22 DIAGNOSIS — I10 ESSENTIAL HYPERTENSION: ICD-10-CM

## 2024-08-22 DIAGNOSIS — D57.00 SICKLE CELL PAIN CRISIS: Primary | ICD-10-CM

## 2024-08-22 PROBLEM — D64.9 ANEMIA: Status: ACTIVE | Noted: 2024-08-22

## 2024-08-22 PROBLEM — R19.7 DIARRHEA: Status: ACTIVE | Noted: 2024-08-22

## 2024-08-22 PROBLEM — T88.8XXA FLUID COLLECTION AT SURGICAL SITE: Status: ACTIVE | Noted: 2024-08-22

## 2024-08-22 LAB
ALBUMIN SERPL BCP-MCNC: 4 G/DL (ref 3.5–5.2)
ALLENS TEST: NORMAL
ALP SERPL-CCNC: 81 U/L (ref 55–135)
ALT SERPL W/O P-5'-P-CCNC: 24 U/L (ref 10–44)
ANION GAP SERPL CALC-SCNC: 10 MMOL/L (ref 8–16)
AST SERPL-CCNC: 30 U/L (ref 10–40)
BASOPHILS # BLD AUTO: 0.04 K/UL (ref 0–0.2)
BASOPHILS NFR BLD: 0.5 % (ref 0–1.9)
BILIRUB SERPL-MCNC: 0.5 MG/DL (ref 0.1–1)
BILIRUB UR QL STRIP: NEGATIVE
BNP SERPL-MCNC: 41 PG/ML (ref 0–99)
BUN SERPL-MCNC: 6 MG/DL (ref 6–20)
CALCIUM SERPL-MCNC: 10.2 MG/DL (ref 8.7–10.5)
CHLORIDE SERPL-SCNC: 108 MMOL/L (ref 95–110)
CLARITY UR REFRACT.AUTO: CLEAR
CO2 SERPL-SCNC: 22 MMOL/L (ref 23–29)
COLOR UR AUTO: NORMAL
CREAT SERPL-MCNC: 0.9 MG/DL (ref 0.5–1.4)
DIFFERENTIAL METHOD BLD: ABNORMAL
EOSINOPHIL # BLD AUTO: 0.1 K/UL (ref 0–0.5)
EOSINOPHIL NFR BLD: 1.6 % (ref 0–8)
ERYTHROCYTE [DISTWIDTH] IN BLOOD BY AUTOMATED COUNT: 21.1 % (ref 11.5–14.5)
EST. GFR  (NO RACE VARIABLE): >60 ML/MIN/1.73 M^2
GLUCOSE SERPL-MCNC: 107 MG/DL (ref 70–110)
GLUCOSE UR QL STRIP: NEGATIVE
HCT VFR BLD AUTO: 31.5 % (ref 37–48.5)
HGB BLD-MCNC: 10.5 G/DL (ref 12–16)
HGB UR QL STRIP: NEGATIVE
IMM GRANULOCYTES # BLD AUTO: 0.02 K/UL (ref 0–0.04)
IMM GRANULOCYTES NFR BLD AUTO: 0.2 % (ref 0–0.5)
KETONES UR QL STRIP: NEGATIVE
LDH SERPL L TO P-CCNC: 0.78 MMOL/L (ref 0.5–2.2)
LEUKOCYTE ESTERASE UR QL STRIP: NEGATIVE
LIPASE SERPL-CCNC: 34 U/L (ref 4–60)
LYMPHOCYTES # BLD AUTO: 2.4 K/UL (ref 1–4.8)
LYMPHOCYTES NFR BLD: 27.8 % (ref 18–48)
MCH RBC QN AUTO: 25 PG (ref 27–31)
MCHC RBC AUTO-ENTMCNC: 33.3 G/DL (ref 32–36)
MCV RBC AUTO: 75 FL (ref 82–98)
MONOCYTES # BLD AUTO: 0.8 K/UL (ref 0.3–1)
MONOCYTES NFR BLD: 9.4 % (ref 4–15)
NEUTROPHILS # BLD AUTO: 5.2 K/UL (ref 1.8–7.7)
NEUTROPHILS NFR BLD: 60.5 % (ref 38–73)
NITRITE UR QL STRIP: NEGATIVE
NRBC BLD-RTO: 1 /100 WBC
OHS QRS DURATION: 90 MS
OHS QTC CALCULATION: 439 MS
PH UR STRIP: 7 [PH] (ref 5–8)
PLATELET # BLD AUTO: 460 K/UL (ref 150–450)
PMV BLD AUTO: 9.8 FL (ref 9.2–12.9)
POTASSIUM SERPL-SCNC: 3.6 MMOL/L (ref 3.5–5.1)
PROT SERPL-MCNC: 8.2 G/DL (ref 6–8.4)
PROT UR QL STRIP: NEGATIVE
RBC # BLD AUTO: 4.2 M/UL (ref 4–5.4)
RETICS/RBC NFR AUTO: 2.6 % (ref 0.5–2.5)
SAMPLE: NORMAL
SITE: NORMAL
SODIUM SERPL-SCNC: 140 MMOL/L (ref 136–145)
SP GR UR STRIP: 1 (ref 1–1.03)
TROPONIN I SERPL DL<=0.01 NG/ML-MCNC: <0.006 NG/ML (ref 0–0.03)
URN SPEC COLLECT METH UR: NORMAL
WBC # BLD AUTO: 8.52 K/UL (ref 3.9–12.7)

## 2024-08-22 PROCEDURE — 87040 BLOOD CULTURE FOR BACTERIA: CPT | Performed by: PHYSICIAN ASSISTANT

## 2024-08-22 PROCEDURE — G0378 HOSPITAL OBSERVATION PER HR: HCPCS

## 2024-08-22 PROCEDURE — 96375 TX/PRO/DX INJ NEW DRUG ADDON: CPT

## 2024-08-22 PROCEDURE — 83605 ASSAY OF LACTIC ACID: CPT

## 2024-08-22 PROCEDURE — 94761 N-INVAS EAR/PLS OXIMETRY MLT: CPT

## 2024-08-22 PROCEDURE — 93005 ELECTROCARDIOGRAM TRACING: CPT

## 2024-08-22 PROCEDURE — 99900035 HC TECH TIME PER 15 MIN (STAT)

## 2024-08-22 PROCEDURE — 63600175 PHARM REV CODE 636 W HCPCS: Performed by: FAMILY MEDICINE

## 2024-08-22 PROCEDURE — 25000003 PHARM REV CODE 250: Performed by: PHYSICIAN ASSISTANT

## 2024-08-22 PROCEDURE — 84484 ASSAY OF TROPONIN QUANT: CPT | Performed by: PHYSICIAN ASSISTANT

## 2024-08-22 PROCEDURE — 85045 AUTOMATED RETICULOCYTE COUNT: CPT | Performed by: PHYSICIAN ASSISTANT

## 2024-08-22 PROCEDURE — 80053 COMPREHEN METABOLIC PANEL: CPT | Performed by: PHYSICIAN ASSISTANT

## 2024-08-22 PROCEDURE — 85025 COMPLETE CBC W/AUTO DIFF WBC: CPT | Performed by: PHYSICIAN ASSISTANT

## 2024-08-22 PROCEDURE — 63600175 PHARM REV CODE 636 W HCPCS

## 2024-08-22 PROCEDURE — 83880 ASSAY OF NATRIURETIC PEPTIDE: CPT | Performed by: PHYSICIAN ASSISTANT

## 2024-08-22 PROCEDURE — 63600175 PHARM REV CODE 636 W HCPCS: Performed by: PHYSICIAN ASSISTANT

## 2024-08-22 PROCEDURE — 81003 URINALYSIS AUTO W/O SCOPE: CPT | Performed by: PHYSICIAN ASSISTANT

## 2024-08-22 PROCEDURE — 25000003 PHARM REV CODE 250

## 2024-08-22 PROCEDURE — 96376 TX/PRO/DX INJ SAME DRUG ADON: CPT

## 2024-08-22 PROCEDURE — 83690 ASSAY OF LIPASE: CPT

## 2024-08-22 PROCEDURE — 99285 EMERGENCY DEPT VISIT HI MDM: CPT | Mod: 25

## 2024-08-22 PROCEDURE — 96374 THER/PROPH/DIAG INJ IV PUSH: CPT

## 2024-08-22 PROCEDURE — 87632 RESP VIRUS 6-11 TARGETS: CPT | Performed by: PHYSICIAN ASSISTANT

## 2024-08-22 PROCEDURE — 93010 ELECTROCARDIOGRAM REPORT: CPT | Mod: ,,, | Performed by: INTERNAL MEDICINE

## 2024-08-22 RX ORDER — PROMETHAZINE HYDROCHLORIDE 12.5 MG/1
25 TABLET ORAL EVERY 6 HOURS PRN
Status: DISCONTINUED | OUTPATIENT
Start: 2024-08-22 | End: 2024-08-31 | Stop reason: HOSPADM

## 2024-08-22 RX ORDER — HYDROMORPHONE HYDROCHLORIDE 1 MG/ML
1 INJECTION, SOLUTION INTRAMUSCULAR; INTRAVENOUS; SUBCUTANEOUS
Status: COMPLETED | OUTPATIENT
Start: 2024-08-22 | End: 2024-08-22

## 2024-08-22 RX ORDER — DIPHENHYDRAMINE HYDROCHLORIDE 50 MG/ML
25 INJECTION INTRAMUSCULAR; INTRAVENOUS EVERY 4 HOURS PRN
Status: DISCONTINUED | OUTPATIENT
Start: 2024-08-22 | End: 2024-08-29

## 2024-08-22 RX ORDER — IBUPROFEN 200 MG
16 TABLET ORAL
Status: DISCONTINUED | OUTPATIENT
Start: 2024-08-22 | End: 2024-08-31 | Stop reason: HOSPADM

## 2024-08-22 RX ORDER — ACETAMINOPHEN 325 MG/1
650 TABLET ORAL EVERY 4 HOURS PRN
Status: DISCONTINUED | OUTPATIENT
Start: 2024-08-22 | End: 2024-08-22

## 2024-08-22 RX ORDER — PROCHLORPERAZINE EDISYLATE 5 MG/ML
2.5 INJECTION INTRAMUSCULAR; INTRAVENOUS EVERY 6 HOURS PRN
Status: DISCONTINUED | OUTPATIENT
Start: 2024-08-22 | End: 2024-08-22

## 2024-08-22 RX ORDER — FOLIC ACID 1 MG/1
1 TABLET ORAL DAILY
Status: DISCONTINUED | OUTPATIENT
Start: 2024-08-22 | End: 2024-08-31 | Stop reason: HOSPADM

## 2024-08-22 RX ORDER — SODIUM CHLORIDE 0.9 % (FLUSH) 0.9 %
10 SYRINGE (ML) INJECTION EVERY 12 HOURS PRN
Status: DISCONTINUED | OUTPATIENT
Start: 2024-08-22 | End: 2024-08-31 | Stop reason: HOSPADM

## 2024-08-22 RX ORDER — AMLODIPINE BESYLATE 10 MG/1
10 TABLET ORAL DAILY
Status: DISCONTINUED | OUTPATIENT
Start: 2024-08-22 | End: 2024-08-31 | Stop reason: HOSPADM

## 2024-08-22 RX ORDER — ONDANSETRON 8 MG/1
8 TABLET, ORALLY DISINTEGRATING ORAL EVERY 8 HOURS PRN
Status: DISCONTINUED | OUTPATIENT
Start: 2024-08-22 | End: 2024-08-22

## 2024-08-22 RX ORDER — MORPHINE SULFATE 15 MG/1
15 TABLET, FILM COATED, EXTENDED RELEASE ORAL 2 TIMES DAILY
Status: DISCONTINUED | OUTPATIENT
Start: 2024-08-22 | End: 2024-08-25

## 2024-08-22 RX ORDER — TIZANIDINE 2 MG/1
4 TABLET ORAL EVERY 8 HOURS PRN
Status: DISCONTINUED | OUTPATIENT
Start: 2024-08-22 | End: 2024-08-31 | Stop reason: HOSPADM

## 2024-08-22 RX ORDER — CARVEDILOL 25 MG/1
25 TABLET ORAL 2 TIMES DAILY
Status: DISCONTINUED | OUTPATIENT
Start: 2024-08-22 | End: 2024-08-31 | Stop reason: HOSPADM

## 2024-08-22 RX ORDER — LACTULOSE 10 G/15ML
20 SOLUTION ORAL EVERY 6 HOURS PRN
Status: DISCONTINUED | OUTPATIENT
Start: 2024-08-22 | End: 2024-08-25

## 2024-08-22 RX ORDER — HYDROMORPHONE HYDROCHLORIDE 1 MG/ML
1 INJECTION, SOLUTION INTRAMUSCULAR; INTRAVENOUS; SUBCUTANEOUS EVERY 4 HOURS PRN
Status: DISCONTINUED | OUTPATIENT
Start: 2024-08-22 | End: 2024-08-23

## 2024-08-22 RX ORDER — OXYCODONE AND ACETAMINOPHEN 10; 325 MG/1; MG/1
1 TABLET ORAL EVERY 6 HOURS PRN
Status: DISCONTINUED | OUTPATIENT
Start: 2024-08-22 | End: 2024-08-22

## 2024-08-22 RX ORDER — GLUCAGON 1 MG
1 KIT INJECTION
Status: DISCONTINUED | OUTPATIENT
Start: 2024-08-22 | End: 2024-08-31 | Stop reason: HOSPADM

## 2024-08-22 RX ORDER — DIPHENHYDRAMINE HCL 25 MG
25 CAPSULE ORAL
Status: COMPLETED | OUTPATIENT
Start: 2024-08-22 | End: 2024-08-22

## 2024-08-22 RX ORDER — DOXYCYCLINE HYCLATE 100 MG
100 TABLET ORAL EVERY 12 HOURS
Status: COMPLETED | OUTPATIENT
Start: 2024-08-22 | End: 2024-08-27

## 2024-08-22 RX ORDER — HYDROXYUREA 500 MG/1
15 CAPSULE ORAL DAILY
Status: DISCONTINUED | OUTPATIENT
Start: 2024-08-23 | End: 2024-08-23

## 2024-08-22 RX ORDER — FLUOXETINE HYDROCHLORIDE 20 MG/1
40 CAPSULE ORAL DAILY
Status: DISCONTINUED | OUTPATIENT
Start: 2024-08-22 | End: 2024-08-31 | Stop reason: HOSPADM

## 2024-08-22 RX ORDER — IBUPROFEN 200 MG
24 TABLET ORAL
Status: DISCONTINUED | OUTPATIENT
Start: 2024-08-22 | End: 2024-08-31 | Stop reason: HOSPADM

## 2024-08-22 RX ORDER — ACETAMINOPHEN 500 MG
1000 TABLET ORAL DAILY PRN
Status: DISCONTINUED | OUTPATIENT
Start: 2024-08-22 | End: 2024-08-31 | Stop reason: HOSPADM

## 2024-08-22 RX ORDER — NALOXONE HCL 0.4 MG/ML
0.02 VIAL (ML) INJECTION
Status: DISCONTINUED | OUTPATIENT
Start: 2024-08-22 | End: 2024-08-31 | Stop reason: HOSPADM

## 2024-08-22 RX ORDER — AMLODIPINE BESYLATE 10 MG/1
10 TABLET ORAL
Status: COMPLETED | OUTPATIENT
Start: 2024-08-22 | End: 2024-08-22

## 2024-08-22 RX ORDER — ENOXAPARIN SODIUM 150 MG/ML
110 INJECTION SUBCUTANEOUS 2 TIMES DAILY
Status: DISCONTINUED | OUTPATIENT
Start: 2024-08-22 | End: 2024-08-26

## 2024-08-22 RX ORDER — GABAPENTIN 400 MG/1
400 CAPSULE ORAL 2 TIMES DAILY
Status: DISCONTINUED | OUTPATIENT
Start: 2024-08-22 | End: 2024-08-31 | Stop reason: HOSPADM

## 2024-08-22 RX ORDER — SODIUM CHLORIDE, SODIUM LACTATE, POTASSIUM CHLORIDE, CALCIUM CHLORIDE 600; 310; 30; 20 MG/100ML; MG/100ML; MG/100ML; MG/100ML
INJECTION, SOLUTION INTRAVENOUS CONTINUOUS
Status: DISCONTINUED | OUTPATIENT
Start: 2024-08-22 | End: 2024-08-26

## 2024-08-22 RX ORDER — DIPHENHYDRAMINE HYDROCHLORIDE 50 MG/ML
25 INJECTION INTRAMUSCULAR; INTRAVENOUS
Status: COMPLETED | OUTPATIENT
Start: 2024-08-22 | End: 2024-08-22

## 2024-08-22 RX ORDER — HYDROMORPHONE HYDROCHLORIDE 1 MG/ML
2 INJECTION, SOLUTION INTRAMUSCULAR; INTRAVENOUS; SUBCUTANEOUS ONCE
Status: COMPLETED | OUTPATIENT
Start: 2024-08-22 | End: 2024-08-22

## 2024-08-22 RX ORDER — HYDROMORPHONE HYDROCHLORIDE 1 MG/ML
1 INJECTION, SOLUTION INTRAMUSCULAR; INTRAVENOUS; SUBCUTANEOUS
Status: DISCONTINUED | OUTPATIENT
Start: 2024-08-22 | End: 2024-08-22

## 2024-08-22 RX ORDER — HYDROMORPHONE HYDROCHLORIDE 1 MG/ML
2 INJECTION, SOLUTION INTRAMUSCULAR; INTRAVENOUS; SUBCUTANEOUS
Status: COMPLETED | OUTPATIENT
Start: 2024-08-22 | End: 2024-08-22

## 2024-08-22 RX ORDER — AMOXICILLIN 250 MG
1 CAPSULE ORAL DAILY PRN
Status: DISCONTINUED | OUTPATIENT
Start: 2024-08-22 | End: 2024-08-25

## 2024-08-22 RX ORDER — MORPHINE SULFATE 15 MG/1
15 TABLET, FILM COATED, EXTENDED RELEASE ORAL 2 TIMES DAILY
Status: DISCONTINUED | OUTPATIENT
Start: 2024-08-22 | End: 2024-08-22

## 2024-08-22 RX ORDER — TALC
6 POWDER (GRAM) TOPICAL NIGHTLY PRN
Status: DISCONTINUED | OUTPATIENT
Start: 2024-08-22 | End: 2024-08-31 | Stop reason: HOSPADM

## 2024-08-22 RX ORDER — FAMOTIDINE 10 MG/ML
20 INJECTION INTRAVENOUS
Status: COMPLETED | OUTPATIENT
Start: 2024-08-22 | End: 2024-08-22

## 2024-08-22 RX ORDER — MORPHINE SULFATE 2 MG/ML
0.1 INJECTION, SOLUTION INTRAMUSCULAR; INTRAVENOUS
Status: DISCONTINUED | OUTPATIENT
Start: 2024-08-22 | End: 2024-08-22

## 2024-08-22 RX ORDER — PROCHLORPERAZINE EDISYLATE 5 MG/ML
2.5 INJECTION INTRAMUSCULAR; INTRAVENOUS EVERY 6 HOURS PRN
Status: DISCONTINUED | OUTPATIENT
Start: 2024-08-22 | End: 2024-08-31 | Stop reason: HOSPADM

## 2024-08-22 RX ADMIN — MORPHINE SULFATE 15 MG: 15 TABLET, EXTENDED RELEASE ORAL at 09:08

## 2024-08-22 RX ADMIN — HYDROMORPHONE HYDROCHLORIDE 1 MG: 1 INJECTION, SOLUTION INTRAMUSCULAR; INTRAVENOUS; SUBCUTANEOUS at 09:08

## 2024-08-22 RX ADMIN — PROMETHAZINE HYDROCHLORIDE 25 MG: 25 INJECTION INTRAMUSCULAR; INTRAVENOUS at 02:08

## 2024-08-22 RX ADMIN — Medication: at 06:08

## 2024-08-22 RX ADMIN — DIPHENHYDRAMINE HYDROCHLORIDE 25 MG: 50 INJECTION, SOLUTION INTRAMUSCULAR; INTRAVENOUS at 08:08

## 2024-08-22 RX ADMIN — DIPHENHYDRAMINE HYDROCHLORIDE 25 MG: 25 CAPSULE ORAL at 02:08

## 2024-08-22 RX ADMIN — HYDROMORPHONE HYDROCHLORIDE 2 MG: 1 INJECTION, SOLUTION INTRAMUSCULAR; INTRAVENOUS; SUBCUTANEOUS at 02:08

## 2024-08-22 RX ADMIN — CARVEDILOL 25 MG: 25 TABLET, FILM COATED ORAL at 09:08

## 2024-08-22 RX ADMIN — AMLODIPINE BESYLATE 10 MG: 10 TABLET ORAL at 11:08

## 2024-08-22 RX ADMIN — DIPHENHYDRAMINE HYDROCHLORIDE 25 MG: 50 INJECTION, SOLUTION INTRAMUSCULAR; INTRAVENOUS at 10:08

## 2024-08-22 RX ADMIN — HYDROMORPHONE HYDROCHLORIDE 2 MG: 1 INJECTION, SOLUTION INTRAMUSCULAR; INTRAVENOUS; SUBCUTANEOUS at 04:08

## 2024-08-22 RX ADMIN — FLUOXETINE HYDROCHLORIDE 40 MG: 20 CAPSULE ORAL at 04:08

## 2024-08-22 RX ADMIN — PROMETHAZINE HYDROCHLORIDE 25 MG: 12.5 TABLET ORAL at 09:08

## 2024-08-22 RX ADMIN — SODIUM CHLORIDE, POTASSIUM CHLORIDE, SODIUM LACTATE AND CALCIUM CHLORIDE: 600; 310; 30; 20 INJECTION, SOLUTION INTRAVENOUS at 04:08

## 2024-08-22 RX ADMIN — TIZANIDINE 4 MG: 2 TABLET ORAL at 09:08

## 2024-08-22 RX ADMIN — HYDROMORPHONE HYDROCHLORIDE 1 MG: 1 INJECTION, SOLUTION INTRAMUSCULAR; INTRAVENOUS; SUBCUTANEOUS at 12:08

## 2024-08-22 RX ADMIN — DOXYCYCLINE HYCLATE 100 MG: 100 TABLET, COATED ORAL at 09:08

## 2024-08-22 RX ADMIN — FOLIC ACID 1 MG: 1 TABLET ORAL at 04:08

## 2024-08-22 RX ADMIN — PROCHLORPERAZINE EDISYLATE 2.5 MG: 5 INJECTION INTRAMUSCULAR; INTRAVENOUS at 10:08

## 2024-08-22 RX ADMIN — GABAPENTIN 400 MG: 300 CAPSULE ORAL at 09:08

## 2024-08-22 RX ADMIN — HYDROMORPHONE HYDROCHLORIDE 1 MG: 1 INJECTION, SOLUTION INTRAMUSCULAR; INTRAVENOUS; SUBCUTANEOUS at 10:08

## 2024-08-22 RX ADMIN — FAMOTIDINE 20 MG: 10 INJECTION, SOLUTION INTRAVENOUS at 10:08

## 2024-08-22 NOTE — ASSESSMENT & PLAN NOTE
44 year old female with history of SC anemia with history of acute chest, beta thalassemia, HTN, obesity, iron deficiency anemia, hx of PE on apixaban, RLE fasciotomy, and recent hospitalization for bacteremia s/p antibiotic course and recent sickle cell pain crisis.     Plan  -LR IV fluids  -Pain control:              -Acetaminophen 1g PRN              -MS contin (morphine) 15mg BID              -PCA hydromorphone, bolus 0.4mg PRN and lockout time 10min, no continuous dose -titrate PRN   -Start hydroxyuea 15mg/kg QD and titrate PRN                -consider PO benadryl 25mg PRN for pruritus              -Bowel regimen: senna PRN, miralax PRN  -Avoid NSAIDs due to allergies

## 2024-08-22 NOTE — MEDICAL/APP STUDENT
[facility]  Hospital Medicine  History & Physical    Patient Name: Nazanin Malone  MRN: [id number]  Patient Class: OP- Observation   Admission Date: 8/22/2024  Attending Physician: Chase Gasca III*   Primary Care Provider: Pee Montgomery MD        Patient information was obtained from ER records.     Subjective:     Principal Problem: Chest pain    Chief Complaint:  Chief Complaint   Patient presents with    Chest Pain     Sickle cell. Reports pain in chest, nausea, and vomiting.        HPI:   44F with PMHx of HTN, beta thalassemia, sickle cell anemia (hx of acute chest), hx of PE (on Lovenox), Rhabdo, RLE fasciotomy p/w abdominal and chest pain x1 day. Pain is diffuse but most concentrated in the RUQ. Pt endorses associated n/v stating that she can not tolerate any PO and vomited her home pain meds today. She also reports 2x episodes of diarrhea today. Pt has a hx of frequent SC pain crises and was recently hospitalized for one last week. Pt states the current pain feels similar to past SC crises pain. Pt takes morphine 15mg BIG and percocet 10mg Q6H at home. Pt reports not hydrating well recently. She reports subjective fever() for the last month.    Pt was recently discharged with 7 days of lasix which she says she took for 4 days and stopped once her swelling resolved. Pt took full course of Keflex she was d/c'd with too except the last dose which she vomited. On her last admission she states that compazine and promethazine rotation helped her sxs, but zofran was ineffective. She denies any urinary symptoms and has normal UOP. She reports compliance with all her medications at home including Lovenox which she last took this morning. She can walk but not for long distances at [place]. She reports a hx of severe allergies (throat closing) with NSAID use.       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     Current Outpatient Medications on File Prior to Encounter   Medication Sig    acetaminophen (TYLENOL) 500 MG tablet Take 1,000 mg by mouth daily as needed for Pain.    amLODIPine (NORVASC) 10 MG tablet Take 1 tablet (10 mg total) by mouth once daily.    carvediloL (COREG) 25 MG tablet Take 1 tablet (25 mg total) by mouth 2 (two) times daily.    enoxaparin (LOVENOX) 120 mg/0.8 mL Syrg Push out 0.1 ml then Inject 0.7 mLs (105 mg total) into the skin 2 (two) times daily.    FLUoxetine 40 MG capsule Take 1 capsule (40 mg total) by mouth once daily.    folic acid (FOLVITE) 1 MG tablet Take 1 tablet (1 mg total) by mouth once daily.    furosemide (LASIX) 20 MG tablet Take 1 tablet (20 mg total) by mouth once daily for 7 days.    lactulose (CHRONULAC) 10 gram/15 mL solution Take 30 mLs (20 g total) by mouth every 6 (six) hours as needed (Constipation).    morphine (MS CONTIN) 15 MG 12 hr tablet Take 1 tablet (15 mg total) by mouth 2 (two) times daily. for 14 days    oxyCODONE-acetaminophen (PERCOCET)  mg per tablet Take 1 tablet by mouth every 6 (six) hours as needed for Pain.    promethazine (PHENERGAN) 25 MG tablet Take 1 tablet (25 mg total) by mouth every 6 (six) hours as needed for Nausea.    senna-docusate 8.6-50 mg (PERICOLACE) 8.6-50 mg per tablet Take 1 tablet by mouth daily as needed for  Constipation.    tiZANidine (ZANAFLEX) 4 MG tablet Take 1 tablet (4 mg total) by mouth every 8 (eight) hours as needed.    albuterol (VENTOLIN HFA) 90 mcg/actuation inhaler Inhale 2 puffs into the lungs every 6 (six) hours as needed for Wheezing or Shortness of Breath. Rescue    ascorbic acid (VITAMIN C ORAL) Take 1 capsule by mouth once daily.    [] cephALEXin (KEFLEX) 500 MG capsule Take 1 capsule (500 mg total) by mouth every 6 (six) hours. for 5 days    fluticasone propionate (FLONASE) 50 mcg/actuation nasal spray INSTILL 1 SPRAY INTO EACH NOSTRIL EVERY DAY     Family History       Problem Relation (Age of Onset)    Diabetes Mother    Heart disease Mother, Father          Tobacco Use    Smoking status: Never    Smokeless tobacco: Never   Substance and Sexual Activity    Alcohol use: No    Drug use: Yes     Types: Marijuana     Comment: periodically     Sexual activity: Not Currently     Birth control/protection: See Surgical Hx     Review of Systems   Constitutional:  Negative for chills and fever.   Eyes: Negative for visual disturbance  HENT: Negative for congestion, ear pain, mouth sores, rhinorrhea, sore throat and trouble swallowing.   Respiratory:  Negative for chest tightness, shortness of breath and wheezing.    Cardiovascular:  Positive for chest pain Negative for palpitations and leg swelling.   Gastrointestinal:  Positive for abdominal pain, vomiting and diarrhea. Negative for abdominal distention, blood in stool, constipation.  Genitourinary:  Negative for dysuria, hematuria and pelvic pain.   Musculoskeletal:  Negative for arthralgias, gait problem and joint swelling.   Skin:  Negative for color change and rash.   Neurological:  Negative for headache. Negative for weakness, light-headedness, numbness.   Psychiatric/Behavioral:  Negative for agitation.      Objective:   @[facility]@Weight: 109.5 kg (241 lb 4.8 oz)  Body mass index is 44.13 kg/m².    Physical Exam  Constitutional:        General: She is not in acute distress.     Appearance: She appears comfortable. She is not toxic-appearing.   HENT:      Mouth: Mucous membranes dry     Pharynx: No oropharyngeal exudate or posterior oropharyngeal erythema.      Eyes: Normal  Cardiovascular:      Rate and Rhythm: Normal rate and regular rhythm.      Pulses: Normal pulses.      Heart sounds: Normal heart sounds. No murmur heard.  Pulmonary:      Effort: Pulmonary effort is normal. No respiratory distress.      Breath sounds: No wheezing.   Abdominal:      General: Bowel sounds are normal. There is no distension.      Palpations: Abdomen is soft.      Tenderness: Positive for abdominal tenderness  Musculoskeletal:         General: No swelling or tenderness.      Cervical back: No rigidity or tenderness.      Right lower leg: No edema.      Left lower leg: No edema.   Skin:     Coloration: Skin is not jaundiced.      Findings: No bruising or rash.   Neurological:      General: No focal deficit present.      Mental Status: She is oriented to person, place, and time.      Motor: No weakness.        Significant Labs: All pertinent labs within the past 24 hours have been reviewed.    Significant Imaging: I have reviewed all pertinent imaging results/findings within the past 24 hours.    Assessment/Plan:      44F with PMHx of HTN, beta thalassemia, sickle cell anemia (hx of acute chest), hx of PE (on Lovenox), Rhabdo, RLE fasciotomy p/w abdominal and chest pain x1 day. Pt endorses associated n/v stating that she can not tolerate any PO. She also reports 2x episodes of diarrhea today. Pt has a hx of frequent SC pain crises and was recently hospitalized for one last week. Pt states the current pain feels similar to past SC crises pain.     Sickle Cell Crisis  -EKG and Troponin negative  -CXR and Abdominal US negative  -Lactate normal  -No leukocytosis  -Hb 10.5 with baseline of ~9  -Pt symptoms likely 2/2 SC pain crisis    Plan:  -maintenance IVF (LR  50ml/hr)  -Pain control:   -Acetaminophen 1g PRN   -MS contin 10mg BID   -PCA hydromorphone, bolus 0.4mg PRN and lockout time 10min, no continuous dose -titrate PRN   -PO benadryl 25mg PRN for pruritus   -Avoid NSAIDs due to allergies   -Bowel regimen: senna PRN, miralax PRN  -Start hydroxyuea 15mg/kg QD and titrate PRN   -VTE prophylaxis  -Monitor vitals and symptoms  -CBC Q24H    Nausea/Vomiting:  -Likely secondary to SC crisis pain  -Pt previously responded well to compazine and promethazine but poorly to zofran  -Start promethazine 25mg Q6H PRN, and compazine 2.5mg Q6H PRN  -Monitor sxs    Diarrhea:  -Pt had 2 episodes recently  -No recent sick contacts  -Pt had recent hospitalization and abx use  -Consider possible infectious etiology, C diff  -Monitor sxs  -W/u if symptoms persist (Stool Cx, C diff toxin test)    Anemia:  -Hb of 10.5, basline ~9  -Pt has hx of beta thalassemia  -Anemia is macrocytic  -Pt is asymptomatic  -Transfure if Hb<7 or symptomatic/hemodynamically unstable  -CBC Q24H    Hx of Pulmonary Embolism:  -On home Lovenox  -Start Lovenox 1mg/kg  -Monitor for sxs    Hypertension:  -Chronic, well controlled  -Continue home carvedilol 25mg BID, amlodipine 10mg QD  -Monitor BP, titrate PRN    Anxiety:  -Chronic, well controlled  -Continue home fluoxetine 40mg QD  -Monitor sxs    Leg pain:  -Chronic, poor pain control recently due to d/c'd gabapentin  -Start gabapentin 400mg BID  -Tizanidine 4mg Q8H PRN    VTE Risk Mitigation (From admission, onward)           Ordered     enoxaparin injection 105 mg  2 times daily         08/22/24 1501     IP VTE HIGH RISK PATIENT  Once         08/22/24 1326     Place sequential compression device  Until discontinued         08/22/24 1326                    Jeni Hanley  Department of Hospital Medicine   [facility] Dept

## 2024-08-22 NOTE — HPI
43yo F with hx of sickle cell beta thalassemia disease, HTN, asthma, depression, PE comes in to ED with chest pain and abdominal pain. Patient also mentions associated diarrhea, bilious vomiting, productive cough with clear sputum, decreased appetite, and slight dyspnea at rest. Patient was recently discharged for a sickle cell crisis, PE, UTI and feels like this sickle cell again. She is adherent to the Lovenox and had almost finished course of Keflex except for the last dose which she threw up.     Patient states the abdominal pain is diffuse but worse on the RUQ and with palpation. Patient has some diffuse chest pain that is worsened with palpation around especially around the port sites. Patient states all this symptoms she is experiencing started yesterday. Patient currently takes MS contin BID and Percocet Q6H for pain and promethazine for nausea. She was give lasix from her last admission due to to upper extremity edema and currently does not take hydroxyurea but expresses wishes to take it. Patient denies any dysuria and hematuria or any recent stressors that could have caused this latest flare of sickle cell.     In the ED, CBC, CMP, CXR and RUQ US was ordered and Dilaudid and Morphine were given for pain control.

## 2024-08-22 NOTE — ASSESSMENT & PLAN NOTE
-resume home meds below    Hypertension Medications               amLODIPine (NORVASC) 10 MG tablet Take 1 tablet (10 mg total) by mouth once daily.    carvediloL (COREG) 25 MG tablet Take 1 tablet (25 mg total) by mouth 2 (two) times daily.

## 2024-08-22 NOTE — HOSPITAL COURSE
Patient was admitted due to vomiting, diarrhea and chest  and abdominal pain. Pain is being controlled with her home medications along with dilaudid PCA pump. PCA pump cancelled on 8/23. Pain regimen transitioned to IV dilaudid with breakthrough oxy PRN resulting in improvement in pain control. Pain will be reassessed again daily. Patient completed a course of Doxycycline for suspected infection.     Gen surg consulted for growing and painful area on left chest where port used to be, believe it is more likely a hematoma, will not perform I&D at this time. LE US ordered for pain showed no DVT. Upper extremity US showed thrombosis in left IJV which is currently being treated with the therapeutic dose of Lovenox the patient is on. Patient is on Lovenox for her hx of PE. CT chest showed a mass likely to be hematoma which might be collecting and pushing on left ex port site causing pain. Patient is slowly off the high dose/IV pain meds and transition to PO. Patient states pain has been controlled and tolerable. Patient has a follow up with Heme/Onc in a month and will be discharged with PO pain meds.

## 2024-08-22 NOTE — ED PROVIDER NOTES
Encounter Date: 2024       History     Chief Complaint   Patient presents with    Chest Pain     Sickle cell. Reports pain in chest, nausea, and vomiting.      44-year-old female with a PMHx of SC anemia with history of acute chest, beta thalassemia, HTN, obesity, iron deficiency anemia, hx of PE (on Lovenox), RLE fasciotomy, and recent hospitalization for bacteremia s/p antibiotic course and recent sickle cell pain crisis presents to the ED with chest pain, shortness of breath and upper abdominal pain. Her symptoms started last night.  She was discharged 6 days ago after a 19 day stay in the hospital for sickle cell crisis.  She was treated at that time for a Left arm with partial DVT in left IJ.  Anticoagulation switch from apixaban to heparin at that time.  She received a blood transfusion while she was inpatient.  Port was removed from left side of chest do to continued chest pain.  She then developed cellulitis and was treated with vancomycin.  She was discharged on Keflex which she completed 2-3 days ago.  She has a slight nonproductive cough and rhinorrhea.  She has chills but does not feel febrile.  Last bowel movement was today and normal.  This feels similar to her past sickle cell crisis.     The history is provided by the patient.     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    Asthma     Depression     Hypertension     Morbid obesity     Opioid dependence 2017    Pneumonia due to Streptococcus pneumoniae 2017    Right lower lobe pneumonia 2017    Sepsis due to Streptococcus pneumoniae 2017    Sickle cell-beta thalassemia disease with pain     Trigeminal neuralgia      Past Surgical History:   Procedure Laterality Date     SECTION      TONSILLECTOMY      TUBAL LIGATION       Family History   Problem  Relation Name Age of Onset    Heart disease Mother      Diabetes Mother      Heart disease Father      Breast cancer Neg Hx      Ovarian cancer Neg Hx      Colon cancer Neg Hx       Social History     Tobacco Use    Smoking status: Never    Smokeless tobacco: Never   Substance Use Topics    Alcohol use: No    Drug use: Yes     Types: Marijuana     Comment: periodically      Review of Systems   Constitutional:  Positive for chills, fatigue and fever.   HENT:  Positive for rhinorrhea.    Respiratory:  Positive for cough and shortness of breath.    Cardiovascular:  Positive for chest pain.   Gastrointestinal:  Positive for abdominal pain, nausea and vomiting. Negative for diarrhea.       Physical Exam     Initial Vitals [08/22/24 0902]   BP Pulse Resp Temp SpO2   (!) 173/97 84 18 100.1 °F (37.8 °C) 99 %      MAP       --         Physical Exam    Nursing note and vitals reviewed.  Constitutional: She appears well-developed and well-nourished. She is not diaphoretic. No distress.   HENT:   Head: Normocephalic and atraumatic.   Nose: Nose normal.   Eyes: Conjunctivae and EOM are normal.   Neck: Neck supple.   Cardiovascular:  Normal rate, regular rhythm, normal heart sounds and intact distal pulses.           Pulses:       Radial pulses are 2+ on the right side and 2+ on the left side.   Pulmonary/Chest: No respiratory distress.   She speaks in full and complete sentences.  Good air movement   Abdominal: There is abdominal tenderness in the right upper quadrant and epigastric area.   No guarding. No abdominal distention   Musculoskeletal:      Cervical back: Neck supple.     Neurological: She is alert and oriented to person, place, and time. Gait normal.   Skin: No rash noted.   Psychiatric: She has a normal mood and affect. Thought content normal.         ED Course   Procedures  Labs Reviewed   CBC W/ AUTO DIFFERENTIAL - Abnormal       Result Value    WBC 8.52      RBC 4.20      Hemoglobin 10.5 (*)     Hematocrit 31.5  (*)     MCV 75 (*)     MCH 25.0 (*)     MCHC 33.3      RDW 21.1 (*)     Platelets 460 (*)     MPV 9.8      Immature Granulocytes 0.2      Gran # (ANC) 5.2      Immature Grans (Abs) 0.02      Lymph # 2.4      Mono # 0.8      Eos # 0.1      Baso # 0.04      nRBC 1 (*)     Gran % 60.5      Lymph % 27.8      Mono % 9.4      Eosinophil % 1.6      Basophil % 0.5      Differential Method Automated     COMPREHENSIVE METABOLIC PANEL - Abnormal    Sodium 140      Potassium 3.6      Chloride 108      CO2 22 (*)     Glucose 107      BUN 6      Creatinine 0.9      Calcium 10.2      Total Protein 8.2      Albumin 4.0      Total Bilirubin 0.5      Alkaline Phosphatase 81      AST 30      ALT 24      eGFR >60.0      Anion Gap 10     RETICULOCYTES - Abnormal    Retic 2.6 (*)    CULTURE, BLOOD   CULTURE, BLOOD   RESPIRATORY VIRAL PANEL BY PCR   URINALYSIS, REFLEX TO URINE CULTURE    Specimen UA Urine, Clean Catch      Color, UA Straw      Appearance, UA Clear      pH, UA 7.0      Specific Gravity, UA 1.005      Protein, UA Negative      Glucose, UA Negative      Ketones, UA Negative      Bilirubin (UA) Negative      Occult Blood UA Negative      Nitrite, UA Negative      Leukocytes, UA Negative      Narrative:     Specimen Source->Urine   TROPONIN I    Troponin I <0.006     B-TYPE NATRIURETIC PEPTIDE   B-TYPE NATRIURETIC PEPTIDE    BNP 41      Narrative:     add on BNP per MD Waldemar Lantigua   LIPASE   PREGNANCY TEST, URINE RAPID   ISTAT LACTATE    POC Lactate 0.78      Sample VENOUS      Site Other      Allens Test N/A       EKG Readings: (Independently Interpreted)   Initial Reading: No STEMI. Previous EKG Date: 7/29/24. Rhythm: Normal Sinus Rhythm. Heart Rate: 82. Axis: Normal. Clinical Impression: Normal Sinus Rhythm     ECG Results              EKG 12-lead (Final result)        Collection Time Result Time QRS Duration OHS QTC Calculation    08/22/24 09:13:22 08/22/24 10:52:39 90 439                     Final result by Interface,  Lab In [facility] (08/22/24 10:52:46)                   Narrative:    Test Reason : R07.9,    Vent. Rate : 082 BPM     Atrial Rate : 082 BPM     P-R Int : 172 ms          QRS Dur : 090 ms      QT Int : 376 ms       P-R-T Axes : 067 054 025 degrees     QTc Int : 439 ms    Normal sinus rhythm  Normal ECG  When compared with ECG of 09-AUG-2024 20:33,  No significant change was found  Confirmed by Miguel Patel MD ([id number]) on 8/22/2024 10:52:32 AM    Referred By:             Confirmed By:Miguel Patel MD                                  Imaging Results              US Abdomen Limited (Final result)  Result time 08/22/24 12:04:40      Final result by Balta Grant MD (08/22/24 12:04:40)                   Impression:      No acute/significant sonographic abnormality.      Electronically signed by: Balta Grant MD  Date:    08/22/2024  Time:    12:04               Narrative:    EXAMINATION:  US ABDOMEN LIMITED    CLINICAL HISTORY:  ruq abdominal pain;    TECHNIQUE:  Limited ultrasound of the right upper quadrant of the abdomen targeted on the biliary system.    COMPARISON:  CTA chest 08/05/2024    FINDINGS:  Gallbladder: No calculi, wall thickening, or pericholecystic fluid, noting a single thin fold near the fundus.  No sonographic Medellin's sign.    Biliary system: The common duct is not dilated, measuring 4 mm.  No intrahepatic ductal dilatation.    Miscellaneous: No upper abdominal ascites.  Imaged portions of the pancreas are within normal limits.  No right hydronephrosis.  Suspected normal appearing lymph node at the greyson hepatis near the pancreas.                                       X-Ray Chest PA And Lateral (Final result)  Result time 08/22/24 10:50:40      Final result by Keyur Mejia MD (08/22/24 10:50:40)                   Impression:      No significant intrathoracic abnormality.  No significant detrimental interval change in the appearance of the chest since the examinations referenced above is  appreciated.      Electronically signed by: Keyur Mejia MD  Date:    08/22/2024  Time:    10:50               Narrative:    EXAMINATION:  XR CHEST PA AND LATERAL    CLINICAL HISTORY:  chest pain;    TECHNIQUE:  Two views of the chest were obtained, with PA and lateral projections submitted.    COMPARISON:  Comparison is made to 07/28/2024 and 02/23/2021.  Clinical information of chest pain.    FINDINGS:  Solitary vascular catheter now seen, its tip at the level of the junction of the superior vena cava and right atrium.  Heart size is within normal limits, and the appearance of the cardiomediastinal silhouette is unchanged since the examination referenced above.  Pulmonary vascularity is normal, with no findings indicating current cardiac decompensation.  Lung zones are clear, and are free of significant airspace consolidation or volume loss.  No pleural fluid.  No hilar or mediastinal mass lesion.  No pneumothorax.                                       Medications   sodium chloride 0.9% flush 10 mL (has no administration in time range)   naloxone 0.4 mg/mL injection 0.02 mg (has no administration in time range)   glucose chewable tablet 16 g (has no administration in time range)   glucose chewable tablet 24 g (has no administration in time range)   glucagon (human recombinant) injection 1 mg (has no administration in time range)   melatonin tablet 6 mg (has no administration in time range)   dextrose 10% bolus 125 mL 125 mL (has no administration in time range)   dextrose 10% bolus 250 mL 250 mL (has no administration in time range)   HYDROmorphone PCA syringe 15 mg/30 mL (0.5 mg/mL) NS - HIGH CONC (has no administration in time range)   acetaminophen tablet 1,000 mg (has no administration in time range)   amLODIPine tablet 10 mg (10 mg Oral Not Given 8/22/24 1618)   carvediloL tablet 25 mg (has no administration in time range)   enoxaparin injection 105 mg (has no administration in time range)   FLUoxetine capsule  40 mg (40 mg Oral Given 8/22/24 1618)   folic acid tablet 1 mg (1 mg Oral Given 8/22/24 1618)   lactulose 20 gram/30 mL solution Soln 20 g (has no administration in time range)   senna-docusate 8.6-50 mg per tablet 1 tablet (has no administration in time range)   promethazine tablet 25 mg (has no administration in time range)   prochlorperazine injection Soln 2.5 mg (has no administration in time range)   lactated ringers infusion (has no administration in time range)   gabapentin capsule 400 mg (has no administration in time range)   tiZANidine tablet 4 mg (has no administration in time range)   hydroxyurea capsule 1,500 mg (has no administration in time range)   HYDROmorphone injection 1 mg (1 mg Intravenous Given 8/22/24 1020)   diphenhydrAMINE injection 25 mg (25 mg Intravenous Given 8/22/24 1019)   sodium chloride 0.9% bolus 1,000 mL 1,000 mL (0 mLs Intravenous Stopped 8/22/24 1123)   famotidine (PF) injection 20 mg (20 mg Intravenous Given 8/22/24 1021)   amLODIPine tablet 10 mg (10 mg Oral Given 8/22/24 1105)   HYDROmorphone injection 1 mg (1 mg Intravenous Given 8/22/24 1200)   promethazine (PHENERGAN) 25 mg in 0.9% NaCl 50 mL IVPB (0 mg Intravenous Stopped 8/22/24 1432)   HYDROmorphone injection 2 mg (2 mg Intravenous Given 8/22/24 1404)   diphenhydrAMINE capsule 25 mg (25 mg Oral Given 8/22/24 1413)   HYDROmorphone injection 2 mg (2 mg Intravenous Given 8/22/24 1618)     Medical Decision Making  44-year-old female with a PMHx of SC anemia with history of acute chest, beta thalassemia, HTN, obesity, iron deficiency anemia, hx of PE, RLE fasciotomy, and recent hospitalization for bacteremia s/p antibiotic course and recent sickle cell pain crisis presents to the ED with chest pain, shortness of breath and upper abdominal pain.  Patient appears uncomfortable though nontoxic.  She was hypertensive with a low-grade fever 100.1.  She has not taken her blood pressure medications today secondary to  vomiting.    Ddx:  Vaso-occlusive crisis, acute chest, aplastic crisis, bacteremia, pneumonia, COVID, influenza, other viral illness, acute cholecystitis, choledocholithiasis, cholangitis, gastroenteritis, gastritis    - Reticulocytes elevated consistent with vaso-occlusive crisis  - Labs without leukocytosis.  Normal lactate.  History of recent cellulitis and bacteremia.  Blood cultures obtained  - Anemia noted though H&H is stable  - EKG with normal sinus rhythm.  Similar to prior studies.  No ST segmental elevation or depression.  Normal troponin.  Pending BNP  - CXR negative for infiltrate, effusion, pneumothorax or other findings that may cause chest pain/shortness of breath  - I considered PE though I have low suspicion at this time.  Patient states this feels like her past sickle cell crisis.  She had a negative CTA chest on 08/05/2024.  She reports compliance with anticoagulation.  She is not hypoxic, tachycardic or tachypneic.  May consider further evaluation if her symptoms do not improve  - chemistries well-appearing.  Mildly low bicarb though otherwise unremarkable.  Patient having epigastric and right upper quadrant abdominal pain, right upper quadrant ultrasound negative for acute cholecystitis or biliary duct dilation  - Patient continues to have pain despite IV Dilaudid.  I will admit to hospital medicine at this time for sickle cell pain crisis for pain control.    Amount and/or Complexity of Data Reviewed  Labs: ordered. Decision-making details documented in ED Course.  Radiology: ordered.    Risk  Prescription drug management.               ED Course as of 08/22/24 1626   Thu Aug 22, 2024   1031 Retic(!): 2.6 [HM]   1031 WBC: 8.52 [HM]   1031 POC Lactate: 0.78 [HM]   1031 Hemoglobin(!): 10.5  Stable [HM]   1031 Hematocrit(!): 31.5 [HM]   1054 Troponin I: <0.006 [HM]      ED Course User Index  [HM] Sussy Issa PA-C                           Clinical Impression:  Final diagnoses:  [R07.9]  Chest pain  [D57.00] Sickle cell pain crisis (Primary)  [R10.9] Abdominal pain, unspecified abdominal location  [R11.2] Nausea and vomiting, unspecified vomiting type          ED Disposition Condition    Observation                 Sussy Issa PA-C  08/22/24 4125

## 2024-08-22 NOTE — PROGRESS NOTES
[facility] - Emergency Dept  Hospital Medicine  Progress Note    Patient Name: Nazanin Malone  MRN: [id number]  Patient Class: OP- Observation   Admission Date: 8/22/2024  Length of Stay: 0 days  Attending Physician: Chase Gasca III*  Primary Care Provider: Pee Montgomery MD        Subjective:     Principal Problem:Sickle cell pain crisis        HPI:  33yo F with hx of sickle cell beta thalassemia disease, HTN, asthma, depression, PE comes in to ED with chest pain and abdominal pain. Patient also mentions associated diarrhea, bilious vomiting, productive cough with clear sputum, decreased appetite, and slight dyspnea at rest. Patient was recently discharged for a sickle cell crisis, PE, UTI and feels like this sickle cell again. She is adherent to the Lovenox and had almost finished course of Keflex except for the last dose which she threw up.     Patient states the abdominal pain is diffuse but worse on the RUQ and with palpation. Patient has some diffuse chest pain that is worsened with palpation around especially around the port sites. Patient states all this symptoms she is experiencing started yesterday. Patient currently takes MS contin BID and Percocet Q6H for pain and promethazine for nausea. She was give lasix from her last admission due to to upper extremity edema and currently does not take hydroxyurea but expresses wishes to take it. Patient denies any dysuria and hematuria or any recent stressors that could have caused this latest flare of sickle cell.     In the ED, CBC, CMP, CXR and RUQ US was ordered and Dilaudid and Morphine were given for pain control.      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     Current Outpatient Medications on File Prior to Encounter   Medication Sig    acetaminophen (TYLENOL) 500 MG tablet Take 1,000 mg by mouth daily as needed for Pain.    amLODIPine (NORVASC) 10 MG tablet Take 1 tablet (10 mg total) by mouth once daily.    carvediloL (COREG) 25 MG tablet Take 1 tablet (25 mg total) by mouth 2 (two) times daily.    enoxaparin (LOVENOX) 120 mg/0.8 mL Syrg Push out 0.1 ml then Inject 0.7 mLs (105 mg total) into the skin 2 (two) times daily.    FLUoxetine 40 MG capsule Take 1 capsule (40 mg total) by mouth once daily.    folic acid (FOLVITE) 1 MG tablet Take 1 tablet (1 mg total) by mouth once daily.    furosemide (LASIX) 20 MG tablet Take 1 tablet (20 mg total) by mouth once daily for 7 days.    lactulose (CHRONULAC) 10 gram/15 mL solution Take 30 mLs (20 g total) by mouth every 6 (six) hours as needed (Constipation).    morphine (MS CONTIN) 15 MG 12 hr tablet Take 1 tablet (15 mg total) by mouth 2 (two) times daily. for 14 days    oxyCODONE-acetaminophen (PERCOCET)  mg per tablet Take 1 tablet by mouth every 6 (six) hours as needed for Pain.    promethazine (PHENERGAN) 25 MG tablet Take 1 tablet (25 mg total) by mouth every 6 (six) hours as needed for Nausea.    senna-docusate 8.6-50 mg (PERICOLACE) 8.6-50 mg per tablet Take 1 tablet by mouth daily as needed for Constipation.    tiZANidine (ZANAFLEX) 4 MG tablet Take 1 tablet (4 mg total) by mouth every 8 (eight) hours as needed.    albuterol (VENTOLIN HFA) 90 mcg/actuation inhaler  Inhale 2 puffs into the lungs every 6 (six) hours as needed for Wheezing or Shortness of Breath. Rescue    ascorbic acid (VITAMIN C ORAL) Take 1 capsule by mouth once daily.    [] cephALEXin (KEFLEX) 500 MG capsule Take 1 capsule (500 mg total) by mouth every 6 (six) hours. for 5 days    fluticasone propionate (FLONASE) 50 mcg/actuation nasal spray INSTILL 1 SPRAY INTO EACH NOSTRIL EVERY DAY     Family History       Problem Relation (Age of Onset)    Diabetes Mother    Heart disease Mother, Father          Tobacco Use    Smoking status: Never    Smokeless tobacco: Never   Substance and Sexual Activity    Alcohol use: No    Drug use: Yes     Types: Marijuana     Comment: periodically     Sexual activity: Not Currently     Birth control/protection: See Surgical Hx     Review of Systems   Constitutional:  Positive for fever.        Subjective fever around 99 F    Respiratory:  Positive for cough and shortness of breath. Negative for wheezing and stridor.         Productive cough with clear thick sputum   Cardiovascular:  Positive for chest pain and palpitations. Negative for leg swelling.   Gastrointestinal:  Positive for abdominal pain, diarrhea, nausea and vomiting.   Genitourinary:  Negative for dysuria and hematuria.   Musculoskeletal:  Positive for myalgias.     Objective:     Vital Signs (Most Recent):  Temp: 99.5 °F (37.5 °C) (24 1404)  Pulse: 82 (24 1404)  Resp: 16 (24 1404)  BP: (!) 158/83 (24 1404)  SpO2: 100 % (24 1404) Vital Signs (24h Range):  Temp:  [99.4 °F (37.4 °C)-100.1 °F (37.8 °C)] 99.5 °F (37.5 °C)  Pulse:  [80-84] 82  Resp:  [16-20] 16  SpO2:  [97 %-100 %] 100 %  BP: (143-173)/(83-97) 158/83     Weight: 109.5 kg (241 lb 4.8 oz)  Body mass index is 44.13 kg/m².     Physical Exam  Constitutional:       Appearance: She is not diaphoretic.   HENT:      Head: Normocephalic and atraumatic.      Mouth/Throat:      Mouth: Mucous membranes are dry.   Cardiovascular:       Rate and Rhythm: Normal rate.      Heart sounds: Normal heart sounds.   Pulmonary:      Effort: Pulmonary effort is normal.      Breath sounds: No wheezing or rales.      Comments: Has port on the right chest  Abdominal:      General: Abdomen is flat.      Palpations: Abdomen is soft.      Tenderness: There is abdominal tenderness. There is guarding. There is no right CVA tenderness or left CVA tenderness.   Musculoskeletal:         General: Deformity present. No swelling.      Comments: Right lower leg fasciotomy   Skin:     General: Skin is warm.   Neurological:      Mental Status: She is alert.                Significant Labs: All pertinent labs within the past 24 hours have been reviewed.  CBC:   Recent Labs   Lab 08/22/24  1004   WBC 8.52   HGB 10.5*   HCT 31.5*   *     CMP:   Recent Labs   Lab 08/22/24  1004      K 3.6      CO2 22*      BUN 6   CREATININE 0.9   CALCIUM 10.2   PROT 8.2   ALBUMIN 4.0   BILITOT 0.5   ALKPHOS 81   AST 30   ALT 24   ANIONGAP 10       Significant Imaging: I have reviewed all pertinent imaging results/findings within the past 24 hours.  CXR 8/22/24 report showed no abnormality or changes since last examination in 7/8/24.   US abdomen report showed no acute /significant abnormalities.    Assessment/Plan:      * Sickle cell pain crisis  44 year old female with history of SC anemia with history of acute chest, beta thalassemia, HTN, obesity, iron deficiency anemia, hx of PE on apixaban, RLE fasciotomy, and recent hospitalization for bacteremia s/p antibiotic course and recent sickle cell pain crisis. Most recent hemoglobin and hematocrit are listed below.  Recent Labs     08/22/24  1004   HGB 10.5*   HCT 31.5*     Plan  -LR IV fluids  -Pain control with Dilaudid PCA pump  -phenergan/compazine for Nausea  -hydroxyurea          History of pulmonary embolism  -Therapeutic dose of Enoxaparin due to hx of clots and PE    Anemia  Anemia is likely due to Beta  Thalaseemia.   Recent Labs     08/22/24  1004   HGB 10.5*   HCT 31.5*     Plan  -Baseline Hgb is around 9  -Macrocytic MCV; most likely 2/2 to Beta Thalassemia   - Monitor serial CBC: Daily  - Transfuse PRBC if patient becomes hemodynamically unstable, symptomatic or H/H drops below 7/21.    Hypertension  -resume home meds below    Hypertension Medications               amLODIPine (NORVASC) 10 MG tablet Take 1 tablet (10 mg total) by mouth once daily.    carvediloL (COREG) 25 MG tablet Take 1 tablet (25 mg total) by mouth 2 (two) times daily.                   VTE Risk Mitigation (From admission, onward)           Ordered     enoxaparin injection 105 mg  2 times daily         08/22/24 1501     IP VTE HIGH RISK PATIENT  Once         08/22/24 1326     Place sequential compression device  Until discontinued         08/22/24 1326                    Discharge Planning   ALYSE:      Code Status: Full Code   Is the patient medically ready for discharge?:     Reason for patient still in hospital (select all that apply): Patient trending condition                     Lin Carter Torrez MD  Department of Hospital Medicine   [facility] - Emergency Dept

## 2024-08-22 NOTE — ASSESSMENT & PLAN NOTE
-Baseline Hgb is around 9  -Macrocytic MCV; most likely 2/2 to Beta Thalassemia   Recent Labs     08/22/24  1004   HGB 10.5*   HCT 31.5*     Plan  - Monitor serial CBC: Daily  - Transfuse PRBC if patient becomes hemodynamically unstable, symptomatic or H/H drops below 7/21.

## 2024-08-22 NOTE — ED TRIAGE NOTES
"Pt reports chest pain and "breathing issues"  Onset last night.  Also having n/v.  Generalized "feeling bad".    "

## 2024-08-22 NOTE — ASSESSMENT & PLAN NOTE
Patient had recently gotten port on left chest removed and admits to pain on site.   Repeat US report shows: Interval increase in size of a complex subcutaneous collection in the left upper chest, measuring up to 5.4 cm.     Plan:  Consult Gen Surg for I&D  Start Doxycycline 100 BID for 5 days

## 2024-08-22 NOTE — SUBJECTIVE & OBJECTIVE
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     Current Outpatient Medications on File Prior to Encounter   Medication Sig    acetaminophen (TYLENOL) 500 MG tablet Take 1,000 mg by mouth daily as needed for Pain.    amLODIPine (NORVASC) 10 MG tablet Take 1 tablet (10 mg total) by mouth once daily.    carvediloL (COREG) 25 MG tablet Take 1 tablet (25 mg total) by mouth 2 (two) times daily.    enoxaparin (LOVENOX) 120 mg/0.8 mL Syrg Push out 0.1 ml then Inject 0.7 mLs (105 mg total) into the skin 2 (two) times daily.    FLUoxetine 40 MG capsule Take 1 capsule (40 mg total) by mouth once daily.    folic acid (FOLVITE) 1 MG tablet Take 1 tablet (1 mg total) by mouth once daily.    furosemide (LASIX) 20 MG tablet Take 1 tablet (20 mg total) by mouth once daily for 7 days.    lactulose (CHRONULAC) 10 gram/15 mL solution Take 30 mLs (20 g total) by mouth every 6 (six) hours as needed (Constipation).    morphine (MS CONTIN) 15 MG 12 hr tablet Take 1 tablet (15 mg total) by mouth 2 (two) times daily. for 14 days    oxyCODONE-acetaminophen (PERCOCET)  mg per tablet Take 1 tablet by mouth every 6 (six) hours as needed for Pain.    promethazine (PHENERGAN) 25 MG tablet Take 1 tablet (25 mg total) by mouth every 6 (six) hours as needed for Nausea.     senna-docusate 8.6-50 mg (PERICOLACE) 8.6-50 mg per tablet Take 1 tablet by mouth daily as needed for Constipation.    tiZANidine (ZANAFLEX) 4 MG tablet Take 1 tablet (4 mg total) by mouth every 8 (eight) hours as needed.    albuterol (VENTOLIN HFA) 90 mcg/actuation inhaler Inhale 2 puffs into the lungs every 6 (six) hours as needed for Wheezing or Shortness of Breath. Rescue    ascorbic acid (VITAMIN C ORAL) Take 1 capsule by mouth once daily.    [] cephALEXin (KEFLEX) 500 MG capsule Take 1 capsule (500 mg total) by mouth every 6 (six) hours. for 5 days    fluticasone propionate (FLONASE) 50 mcg/actuation nasal spray INSTILL 1 SPRAY INTO EACH NOSTRIL EVERY DAY     Family History       Problem Relation (Age of Onset)    Diabetes Mother    Heart disease Mother, Father          Tobacco Use    Smoking status: Never    Smokeless tobacco: Never   Substance and Sexual Activity    Alcohol use: No    Drug use: Yes     Types: Marijuana     Comment: periodically     Sexual activity: Not Currently     Birth control/protection: See Surgical Hx     Review of Systems   Constitutional:  Positive for fever.        Subjective fever around 99 F    Respiratory:  Positive for cough and shortness of breath. Negative for wheezing and stridor.         Productive cough with clear thick sputum   Cardiovascular:  Positive for chest pain and palpitations. Negative for leg swelling.   Gastrointestinal:  Positive for abdominal pain, diarrhea, nausea and vomiting.   Genitourinary:  Negative for dysuria and hematuria.   Musculoskeletal:  Positive for myalgias.     Objective:     Vital Signs (Most Recent):  Temp: 99.5 °F (37.5 °C) (24 1404)  Pulse: 84 (24 1830)  Resp: 16 (24)  BP: (!) 144/88 (24)  SpO2: 100 % (24) Vital Signs (24h Range):  Temp:  [99.4 °F (37.4 °C)-100.1 °F (37.8 °C)] 99.5 °F (37.5 °C)  Pulse:  [80-88] 84  Resp:  [16-20] 16  SpO2:  [97 %-100 %] 100 %  BP: (143-173)/(83-99) 144/88      Weight: 109.5 kg (241 lb 4.8 oz)  Body mass index is 44.13 kg/m².     Physical Exam  Constitutional:       Appearance: She is not diaphoretic.   HENT:      Head: Normocephalic and atraumatic.      Mouth/Throat:      Mouth: Mucous membranes are dry.   Cardiovascular:      Rate and Rhythm: Normal rate.      Heart sounds: Normal heart sounds.   Pulmonary:      Effort: Pulmonary effort is normal.      Breath sounds: No wheezing or rales.      Comments: Has port on the right chest.  Has undefined collection/mass on where the old left port was  Abdominal:      General: Abdomen is flat.      Palpations: Abdomen is soft.      Tenderness: There is abdominal tenderness. There is guarding. There is no right CVA tenderness or left CVA tenderness.   Musculoskeletal:         General: Deformity present. No swelling.      Comments: Right lower leg fasciotomy   Skin:     General: Skin is warm.   Neurological:      Mental Status: She is alert.                Significant Labs: All pertinent labs within the past 24 hours have been reviewed.  CBC:   Recent Labs   Lab 08/22/24  1004   WBC 8.52   HGB 10.5*   HCT 31.5*   *     CMP:   Recent Labs   Lab 08/22/24  1004      K 3.6      CO2 22*      BUN 6   CREATININE 0.9   CALCIUM 10.2   PROT 8.2   ALBUMIN 4.0   BILITOT 0.5   ALKPHOS 81   AST 30   ALT 24   ANIONGAP 10       Significant Imaging: I have reviewed all pertinent imaging results/findings within the past 24 hours.  CXR 8/22/24 report showed no abnormality or changes since last examination in 7/8/24.   US abdomen report showed no acute /significant abnormalities.

## 2024-08-23 LAB
ALBUMIN SERPL BCP-MCNC: 3.4 G/DL (ref 3.5–5.2)
ALP SERPL-CCNC: 65 U/L (ref 55–135)
ALT SERPL W/O P-5'-P-CCNC: 20 U/L (ref 10–44)
ANION GAP SERPL CALC-SCNC: 6 MMOL/L (ref 8–16)
AST SERPL-CCNC: 32 U/L (ref 10–40)
BASOPHILS # BLD AUTO: 0.05 K/UL (ref 0–0.2)
BASOPHILS NFR BLD: 0.7 % (ref 0–1.9)
BILIRUB SERPL-MCNC: 0.7 MG/DL (ref 0.1–1)
BUN SERPL-MCNC: 7 MG/DL (ref 6–20)
CALCIUM SERPL-MCNC: 9.5 MG/DL (ref 8.7–10.5)
CHLORIDE SERPL-SCNC: 109 MMOL/L (ref 95–110)
CO2 SERPL-SCNC: 24 MMOL/L (ref 23–29)
CREAT SERPL-MCNC: 1 MG/DL (ref 0.5–1.4)
DIFFERENTIAL METHOD BLD: ABNORMAL
EOSINOPHIL # BLD AUTO: 0.2 K/UL (ref 0–0.5)
EOSINOPHIL NFR BLD: 2.9 % (ref 0–8)
ERYTHROCYTE [DISTWIDTH] IN BLOOD BY AUTOMATED COUNT: 21.7 % (ref 11.5–14.5)
EST. GFR  (NO RACE VARIABLE): >60 ML/MIN/1.73 M^2
GLUCOSE SERPL-MCNC: 78 MG/DL (ref 70–110)
HCT VFR BLD AUTO: 29.8 % (ref 37–48.5)
HGB BLD-MCNC: 9.6 G/DL (ref 12–16)
IMM GRANULOCYTES # BLD AUTO: 0.01 K/UL (ref 0–0.04)
IMM GRANULOCYTES NFR BLD AUTO: 0.1 % (ref 0–0.5)
LYMPHOCYTES # BLD AUTO: 3.7 K/UL (ref 1–4.8)
LYMPHOCYTES NFR BLD: 48.5 % (ref 18–48)
MAGNESIUM SERPL-MCNC: 2 MG/DL (ref 1.6–2.6)
MCH RBC QN AUTO: 25.1 PG (ref 27–31)
MCHC RBC AUTO-ENTMCNC: 32.2 G/DL (ref 32–36)
MCV RBC AUTO: 78 FL (ref 82–98)
MONOCYTES # BLD AUTO: 1.1 K/UL (ref 0.3–1)
MONOCYTES NFR BLD: 14.2 % (ref 4–15)
NEUTROPHILS # BLD AUTO: 2.5 K/UL (ref 1.8–7.7)
NEUTROPHILS NFR BLD: 33.6 % (ref 38–73)
NRBC BLD-RTO: 1 /100 WBC
PHOSPHATE SERPL-MCNC: 4.3 MG/DL (ref 2.7–4.5)
PLATELET # BLD AUTO: 390 K/UL (ref 150–450)
PMV BLD AUTO: 10.6 FL (ref 9.2–12.9)
POTASSIUM SERPL-SCNC: 3.8 MMOL/L (ref 3.5–5.1)
PROT SERPL-MCNC: 6.9 G/DL (ref 6–8.4)
RBC # BLD AUTO: 3.82 M/UL (ref 4–5.4)
SODIUM SERPL-SCNC: 139 MMOL/L (ref 136–145)
WBC # BLD AUTO: 7.54 K/UL (ref 3.9–12.7)

## 2024-08-23 PROCEDURE — 25000003 PHARM REV CODE 250

## 2024-08-23 PROCEDURE — 63600175 PHARM REV CODE 636 W HCPCS: Performed by: FAMILY MEDICINE

## 2024-08-23 PROCEDURE — 80053 COMPREHEN METABOLIC PANEL: CPT

## 2024-08-23 PROCEDURE — 85025 COMPLETE CBC W/AUTO DIFF WBC: CPT

## 2024-08-23 PROCEDURE — 83735 ASSAY OF MAGNESIUM: CPT

## 2024-08-23 PROCEDURE — 63600175 PHARM REV CODE 636 W HCPCS

## 2024-08-23 PROCEDURE — 94761 N-INVAS EAR/PLS OXIMETRY MLT: CPT

## 2024-08-23 PROCEDURE — 27000207 HC ISOLATION

## 2024-08-23 PROCEDURE — 84100 ASSAY OF PHOSPHORUS: CPT

## 2024-08-23 PROCEDURE — 96372 THER/PROPH/DIAG INJ SC/IM: CPT

## 2024-08-23 PROCEDURE — 99900035 HC TECH TIME PER 15 MIN (STAT)

## 2024-08-23 PROCEDURE — 11000001 HC ACUTE MED/SURG PRIVATE ROOM

## 2024-08-23 PROCEDURE — 36415 COLL VENOUS BLD VENIPUNCTURE: CPT

## 2024-08-23 RX ORDER — OXYCODONE HYDROCHLORIDE 10 MG/1
10 TABLET ORAL EVERY 6 HOURS PRN
Status: DISCONTINUED | OUTPATIENT
Start: 2024-08-23 | End: 2024-08-25

## 2024-08-23 RX ORDER — HYDROXYUREA 500 MG/1
1000 CAPSULE ORAL DAILY
Status: DISCONTINUED | OUTPATIENT
Start: 2024-08-24 | End: 2024-08-31 | Stop reason: HOSPADM

## 2024-08-23 RX ORDER — GABAPENTIN 400 MG/1
400 CAPSULE ORAL 3 TIMES DAILY
Status: ON HOLD | COMMUNITY
End: 2024-08-31

## 2024-08-23 RX ORDER — HYDROMORPHONE HYDROCHLORIDE 2 MG/ML
3 INJECTION, SOLUTION INTRAMUSCULAR; INTRAVENOUS; SUBCUTANEOUS EVERY 4 HOURS
Status: DISCONTINUED | OUTPATIENT
Start: 2024-08-23 | End: 2024-08-27

## 2024-08-23 RX ORDER — HYDROMORPHONE HYDROCHLORIDE 1 MG/ML
1 INJECTION, SOLUTION INTRAMUSCULAR; INTRAVENOUS; SUBCUTANEOUS EVERY 4 HOURS
Status: DISCONTINUED | OUTPATIENT
Start: 2024-08-23 | End: 2024-08-23

## 2024-08-23 RX ADMIN — HYDROXYUREA 1500 MG: 500 CAPSULE ORAL at 09:08

## 2024-08-23 RX ADMIN — HYDROMORPHONE HYDROCHLORIDE 3 MG: 2 INJECTION INTRAMUSCULAR; INTRAVENOUS; SUBCUTANEOUS at 09:08

## 2024-08-23 RX ADMIN — DIPHENHYDRAMINE HYDROCHLORIDE 25 MG: 50 INJECTION, SOLUTION INTRAMUSCULAR; INTRAVENOUS at 09:08

## 2024-08-23 RX ADMIN — TIZANIDINE 4 MG: 2 TABLET ORAL at 05:08

## 2024-08-23 RX ADMIN — HYDROMORPHONE HYDROCHLORIDE 1 MG: 1 INJECTION, SOLUTION INTRAMUSCULAR; INTRAVENOUS; SUBCUTANEOUS at 05:08

## 2024-08-23 RX ADMIN — DIPHENHYDRAMINE HYDROCHLORIDE 25 MG: 50 INJECTION, SOLUTION INTRAMUSCULAR; INTRAVENOUS at 01:08

## 2024-08-23 RX ADMIN — ENOXAPARIN SODIUM 105 MG: 120 INJECTION SUBCUTANEOUS at 09:08

## 2024-08-23 RX ADMIN — HYDROMORPHONE HYDROCHLORIDE 1 MG: 1 INJECTION, SOLUTION INTRAMUSCULAR; INTRAVENOUS; SUBCUTANEOUS at 09:08

## 2024-08-23 RX ADMIN — CARVEDILOL 25 MG: 25 TABLET, FILM COATED ORAL at 09:08

## 2024-08-23 RX ADMIN — GABAPENTIN 400 MG: 300 CAPSULE ORAL at 09:08

## 2024-08-23 RX ADMIN — PROCHLORPERAZINE EDISYLATE 2.5 MG: 5 INJECTION INTRAMUSCULAR; INTRAVENOUS at 09:08

## 2024-08-23 RX ADMIN — FOLIC ACID 1 MG: 1 TABLET ORAL at 09:08

## 2024-08-23 RX ADMIN — PROMETHAZINE HYDROCHLORIDE 25 MG: 12.5 TABLET ORAL at 09:08

## 2024-08-23 RX ADMIN — HYDROMORPHONE HYDROCHLORIDE 3 MG: 2 INJECTION INTRAMUSCULAR; INTRAVENOUS; SUBCUTANEOUS at 01:08

## 2024-08-23 RX ADMIN — DIPHENHYDRAMINE HYDROCHLORIDE 25 MG: 50 INJECTION, SOLUTION INTRAMUSCULAR; INTRAVENOUS at 05:08

## 2024-08-23 RX ADMIN — MORPHINE SULFATE 15 MG: 15 TABLET, EXTENDED RELEASE ORAL at 09:08

## 2024-08-23 RX ADMIN — DOXYCYCLINE HYCLATE 100 MG: 100 TABLET, COATED ORAL at 09:08

## 2024-08-23 RX ADMIN — FLUOXETINE HYDROCHLORIDE 40 MG: 20 CAPSULE ORAL at 09:08

## 2024-08-23 RX ADMIN — HYDROMORPHONE HYDROCHLORIDE 1 MG: 1 INJECTION, SOLUTION INTRAMUSCULAR; INTRAVENOUS; SUBCUTANEOUS at 01:08

## 2024-08-23 RX ADMIN — AMLODIPINE BESYLATE 10 MG: 10 TABLET ORAL at 09:08

## 2024-08-23 RX ADMIN — HYDROMORPHONE HYDROCHLORIDE 3 MG: 2 INJECTION INTRAMUSCULAR; INTRAVENOUS; SUBCUTANEOUS at 05:08

## 2024-08-23 NOTE — PLAN OF CARE
08/23/24 0849   Readmission   Was this a planned readmission? No   Why were you hospitalized in the last 30 days? sickle   Why were you readmitted? Alarmed about signs/symptoms   When you left the hospital how did you feel? didn't feel the greatest, chest was hurting d/t port being removed.  U/S yesterday showed that site had swelled up even more.   When you left the hospital where did you go? Home with Family   Did patient/caregiver refused recommended DC plan? No   Tell me about what happened between when you left the hospital and the day you returned. Chest was still hurting, began to swell up even more to chest site   When did you start not feeling well? 2-3 days after d/c started feeling worse and worse   Did you try to manage your symptoms your self? Yes   What did you do? used hot packs   Did you call anyone? No   Why? just didn't see the need   Did you try to see or did see a doctor or nurse before you came? No   Why? having chest pain, was wondering if she was having a heart attack   Did you have  a follow-up appointment on discharge? Yes   Did you go? No   Why?   (Appt not til Sept 23.)     THERESA MartinezN, BS, RN, CCM

## 2024-08-23 NOTE — PROGRESS NOTES
[facility] ([place] [address])  [facility] Medicine  Progress Note    Patient Name: Nazanin Malone  MRN: [id number]  Patient Class: IP- Inpatient   Admission Date: 8/22/2024  Length of Stay: 0 days  Attending Physician: Chase Gasca III*  Primary Care Provider: Pee Montgomery MD        Subjective:     Principal Problem:Sickle cell pain crisis        HPI:  33yo F with hx of sickle cell beta thalassemia disease, HTN, asthma, depression, PE comes in to ED with chest pain and abdominal pain. Patient also mentions associated diarrhea, bilious vomiting, productive cough with clear sputum, decreased appetite, and slight dyspnea at rest. Patient was recently discharged for a sickle cell crisis, PE, UTI and feels like this sickle cell again. She is adherent to the Lovenox and had almost finished course of Keflex except for the last dose which she threw up.     Patient states the abdominal pain is diffuse but worse on the RUQ and with palpation. Patient has some diffuse chest pain that is worsened with palpation around especially around the port sites. Patient states all this symptoms she is experiencing started yesterday. Patient currently takes MS contin BID and Percocet Q6H for pain and promethazine for nausea. She was give lasix from her last admission due to to upper extremity edema and currently does not take hydroxyurea but expresses wishes to take it. Patient denies any dysuria and hematuria or any recent stressors that could have caused this latest flare of sickle cell.     In the ED, CBC, CMP, CXR and RUQ US was ordered and Dilaudid and Morphine were given for pain control.      Overview/Hospital Course:  Patient was admitted due to vomiting and diarrhea and chest pain and abdominal pain.   C diff is also on the differential since she was on recent abx use and chronic opoid use. Pain is being controlled with her home medications along with dilaudid PCA pump but was stopped due to her stating she  prefers not to have to wake up to get pain med administered and if she doesn't click it, she wakes up in pain and has to ask for breakthrough pain meds. Gen surg consulted for I&D for growing and painful area on left chest where port used to be. Patient thinks this is the cause of her vasocclusive crisis. PCA pump cancelled. Pain regimen transitioned to IV dilaudid with breakthrough oxy PRN. Pain will be reassessed again daily.      Interval History: Patient states that her pain was controlled strength wise, but she does not like how she has to wake up to press it. She states if she does not press it, she wakes up in pain and has to ask for breakthrough pain medicine. Patient still has pain at the Left port site worsened with palpation. Patient denies increased dyspnea. She also denies vomiting or any BM.      Objective:   Though she has constantly been hypertensive throughout stay.  Vital Signs (Most Recent):  Temp: 98.8 °F (37.1 °C) (08/23/24 1157)  Pulse: 75 (08/23/24 1157)  Resp: 16 (08/23/24 1324)  BP: 115/71 (08/23/24 1157)  SpO2: 98 % (08/23/24 1157) Vital Signs (24h Range):  Temp:  [98 °F (36.7 °C)-98.8 °F (37.1 °C)] 98.8 °F (37.1 °C)  Pulse:  [64-84] 75  Resp:  [16-18] 16  SpO2:  [98 %-100 %] 98 %  BP: (100-158)/(65-96) 115/71     Weight: 109.5 kg (241 lb 4.8 oz)  Body mass index is 44.13 kg/m².  No intake or output data in the 24 hours ending 08/23/24 1557        Physical Exam  Constitutional:       Appearance: She is not diaphoretic.   HENT:      Head: Normocephalic and atraumatic.   Cardiovascular:      Rate and Rhythm: Normal rate.      Heart sounds: Normal heart sounds.   Pulmonary:      Effort: Pulmonary effort is normal.      Breath sounds: No wheezing or rales.      Comments: Has port on the right chest.  Has undefined collection/mass on where the old left port was  Abdominal:      General: Abdomen is flat. There is no distension.      Palpations: Abdomen is soft.      Tenderness: There is abdominal  tenderness. There is no right CVA tenderness or left CVA tenderness.   Musculoskeletal:         General: Deformity present. No swelling.      Comments: Right lower leg fasciotomy   Skin:     General: Skin is warm.   Neurological:      Mental Status: She is alert.             Significant Labs: All pertinent labs within the past 24 hours have been reviewed.  CBC:   Recent Labs   Lab 08/22/24  1004 08/23/24  0442   WBC 8.52 7.54   HGB 10.5* 9.6*   HCT 31.5* 29.8*   * 390     CMP:   Recent Labs   Lab 08/22/24  1004 08/23/24  0442    139   K 3.6 3.8    109   CO2 22* 24    78   BUN 6 7   CREATININE 0.9 1.0   CALCIUM 10.2 9.5   PROT 8.2 6.9   ALBUMIN 4.0 3.4*   BILITOT 0.5 0.7   ALKPHOS 81 65   AST 30 32   ALT 24 20   ANIONGAP 10 6*       Significant Imaging: I have reviewed all pertinent imaging results/findings within the past 24 hours.  8/22/24 US Soft Tissue axilla Left Impression: Interval increase in size of a complex subcutaneous collection in the left upper chest, measuring up to 5.4 cm.     Assessment/Plan:      * Sickle cell pain crisis  44 year old female with history of SC anemia with history of acute chest, beta thalassemia, HTN, obesity, iron deficiency anemia, hx of PE on apixaban, RLE fasciotomy, and recent hospitalization for bacteremia s/p antibiotic course and recent sickle cell pain crisis.     Plan  -Continue titrating pain meds  -LR IV fluids  -Pain control:   -Discontinue PCA Dilaudid--> go back on IV push Dilaudid 3mg Q4 and 20 oxycodone between as breakthrough   -Acetaminophen 1g PRN   -MS contin (morphine) 15mg BID   -Start hydroxyuea 1000mg and titrate PRN                -consider PO benadryl 25mg PRN for pruritus              -Bowel regimen: senna PRN, miralax PRN  -Avoid NSAIDs due to allergies          Diarrhea  Pt had 2 episodes before admission and No recent sick contacts, Pt had recent hospitalization and abx use but on chronic opoid use   Consider possible  infectious etiology like C diff     Plan  -Stool culture; workup for C diff     Fluid collection at surgical site  Patient had recently gotten port on left chest removed and admits to pain on site.   Repeat US report shows: Interval increase in size of a complex subcutaneous collection in the left upper chest, measuring up to 5.4 cm.     Plan:  Consult Gen Surg for I&D  Start Doxycycline 100 BID for 5 days          Anemia  -Baseline Hgb is around 9  -Macrocytic MCV; most likely 2/2 to Beta Thalassemia   Recent Labs     08/22/24  1004 08/23/24  0442   HGB 10.5* 9.6*   HCT 31.5* 29.8*     Plan  - Monitor serial CBC: Daily  - Transfuse PRBC if patient becomes hemodynamically unstable, symptomatic or H/H drops below 7/21.    Nausea and vomiting  Likely secondary to SC crisis pain  Pt previously responded well to compazine and promethazine but poorly to zofran    Plan:  -Start promethazine 25mg Q6H PRN, and compazine 2.5mg Q6H PRN      History of pulmonary embolism  -Therapeutic dose of Enoxaparin due to hx of clots and PE    Hypertension  -resume home meds below    Hypertension Medications               amLODIPine (NORVASC) 10 MG tablet Take 1 tablet (10 mg total) by mouth once daily.    carvediloL (COREG) 25 MG tablet Take 1 tablet (25 mg total) by mouth 2 (two) times daily.                 Anxiety  -continue flouxetine        VTE Risk Mitigation (From admission, onward)           Ordered     enoxaparin injection 105 mg  2 times daily         08/22/24 1501     IP VTE HIGH RISK PATIENT  Once         08/22/24 1326     Place sequential compression device  Until discontinued         08/22/24 1326                    Discharge Planning   ALYSE: 8/30/2024     Code Status: Full Code   Is the patient medically ready for discharge?:     Reason for patient still in hospital (select all that apply): Patient trending condition  Discharge Plan A: Home                  iLn Torrez MD  Department of Hospital Medicine    [facility] ([place])

## 2024-08-23 NOTE — PLAN OF CARE
Problem: Adult Inpatient Plan of Care  Goal: Plan of Care Review  Outcome: Progressing  Goal: Patient-Specific Goal (Individualized)  Outcome: Progressing  Goal: Absence of Hospital-Acquired Illness or Injury  Outcome: Progressing  Goal: Optimal Comfort and Wellbeing  Outcome: Progressing  Goal: Readiness for Transition of Care  Outcome: Progressing     Problem: Bariatric Environmental Safety  Goal: Safety Maintained with Care  Outcome: Progressing     Problem: Acute Kidney Injury/Impairment  Goal: Fluid and Electrolyte Balance  Outcome: Progressing  Goal: Improved Oral Intake  Outcome: Progressing  Goal: Effective Renal Function  Outcome: Progressing     Problem: Pneumonia  Goal: Fluid Balance  Outcome: Progressing  Goal: Resolution of Infection Signs and Symptoms  Outcome: Progressing  Goal: Effective Oxygenation and Ventilation  Outcome: Progressing     Problem: Infection  Goal: Absence of Infection Signs and Symptoms  Outcome: Progressing     Problem: Wound  Goal: Optimal Coping  Outcome: Progressing  Goal: Optimal Functional Ability  Outcome: Progressing  Goal: Absence of Infection Signs and Symptoms  Outcome: Progressing  Goal: Improved Oral Intake  Outcome: Progressing  Goal: Optimal Pain Control and Function  Outcome: Progressing  Goal: Skin Health and Integrity  Outcome: Progressing  Goal: Optimal Wound Healing  Outcome: Progressing     Problem: Fall Injury Risk  Goal: Absence of Fall and Fall-Related Injury  Outcome: Progressing

## 2024-08-23 NOTE — RESPIRATORY THERAPY
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    24 Hours Vitals Range:  Temp:  [98 °F (36.7 °C)-99.5 °F (37.5 °C)]   Pulse:  [64-88]   Resp:  [16-20]   BP: (100-158)/(65-99)   SpO2:  [98 %-100 %]     Labs:    Recent Labs     24  1004 24  0442    139   K 3.6 3.8    109   CO2 22* 24   BUN 6 7   CREATININE 0.9 1.0    78   PHOS  --  4.3   MG  --  2.0        No results for input(s): "PH", "PCO2", "PO2", "HCO3", "POCSATURATED", "BE" in the last 72 hours.    ASSESSMENT/INTERVENTIONS  Bedside ETCO2 check completed. PCA pump discontinue order just placed. RN preparing to remove pump and ETCO2 monitor.     Last VS   Temp: 98.8 °F (37.1 °C) (808)  Pulse: 75 (900)  Resp: 18 (949)  BP: 147/72 (808)  SpO2: 98 % (900)    Level of Consciousness: Level of Consciousness (AVPU): alert  Respiratory Effort: Respiratory Effort: Normal, Unlabored Expansion/Accessory Muscle Usage: Expansion/Accessory Muscles/Retractions: expansion symmetric, no retractions, no use of accessory muscles  All Lung Field Breath Sounds: All Lung Fields Breath Sounds: Anterior:, Posterior:, clear, diminished  Is the ETCO2 monitor on? Yes  Is the patient wearing a cannula? Yes  Are ETCO2 orders placed? No  Is the patient on a PCA pump? Yes  ETCO2 " monitored: ETCO2 (mmHg): 41 mmHg  Ambu at bedside:  yes    Active Orders   Respiratory Care    Incentive spirometry     Frequency: Q4H     Number of Occurrences: Until Specified       RECOMMENDATIONS    We recommend: RRT Recs: Continue POC per primary team.      FOLLOW-UP    Please call back the Rapid Response RT, Betty Pisano, RRT at x [phone] for any questions or concerns.

## 2024-08-23 NOTE — ASSESSMENT & PLAN NOTE
44 year old female with history of SC anemia with history of acute chest, beta thalassemia, HTN, obesity, iron deficiency anemia, hx of PE on apixaban, RLE fasciotomy, and recent hospitalization for bacteremia s/p antibiotic course and recent sickle cell pain crisis.     Plan  -Continue titrating pain meds  -LR IV fluids  -Pain control:   -Discontinue PCA Dilaudid--> go back on IV push Dilaudid 3mg Q4 and 20 oxycodone between as breakthrough   -Acetaminophen 1g PRN   -MS contin (morphine) 15mg BID   -Start hydroxyuea 1000mg and titrate PRN                -consider PO benadryl 25mg PRN for pruritus              -Bowel regimen: senna PRN, miralax PRN  -Avoid NSAIDs due to allergies

## 2024-08-23 NOTE — PLAN OF CARE
[facility] - Med Surg ([facility]-)  Initial Discharge Assessment       Primary Care Provider: Pee Montgomery MD    Admission Diagnosis: Sickle cell pain crisis [D57.00]  Chest pain [R07.9]  Abdominal pain, unspecified abdominal location [R10.9]  Nausea and vomiting, unspecified vomiting type [R11.2]    Admission Date: 8/22/2024  Expected Discharge Date: 8/30/2024    Transition of Care Barriers: (P) None    Payor: [employer] MANAGED MEDICARE / Plan: [employer] MEDICARE PLAN HMO / Product Type: Medicare Advantage /     Extended Emergency Contact Information  Primary Emergency Contact: Lema,Oksana  Mobile Phone: [phone]  Relation: Relative  Preferred language: English   needed? No    Discharge Plan A: (P) Home  Discharge Plan B: (P) Home with family      [id number] [place], [place] - [address]  [address]  [place] [address]  Phone: [phone] Fax: [phone]    [facility]  [address] [address]  Phone: [phone] Fax: [phone]    [facility]  [address]  [facility] [address]  Phone: [phone] Fax: [phone]    [facility] [address] [place] [facility] [place], [place] - [address]  [address]  [place] [address]  Phone: [phone] Fax: [phone]    [id number] - [place], [place] - [address] AT [facility]  [address]  [place] [address]  Phone: [phone] Fax: [phone]    [facility] [address] [place] [facility] [place] - [address]  [address]  [place] [address]  Phone: [phone] Fax: [phone]      Initial Assessment (most recent)       Adult Discharge Assessment - 08/23/24 0841          Discharge Assessment    Assessment Type Discharge Planning Assessment (P)      Confirmed/corrected address, phone number and insurance Yes (P)      Confirmed Demographics Correct on Facesheet (P)      Source of Information patient (P)      Does patient/caregiver  understand observation status Yes (P)      Communicated ALYSE with patient/caregiver No (P)      Reason For Admission Sickle (P)      People in Home child(jayce), adult (P)      Facility Arrived From: n/a (P)      Do you expect to return to your current living situation? Yes (P)      Do you have help at home or someone to help you manage your care at home? Yes (P)      Who are your caregiver(s) and their phone number(s)? Keenan Malone, daughter 19 yo, does not have a phone (P)      Prior to hospitilization cognitive status: Unable to Assess (P)      Current cognitive status: Alert/Oriented (P)      Walking or Climbing Stairs Difficulty no (P)      Dressing/Bathing Difficulty no (P)      Home Accessibility wheelchair accessible (P)      Home Layout Able to live on 1st floor (P)      Equipment Currently Used at Home none (P)      Readmission within 30 days? Yes (P)      Do you currently have service(s) that help you manage your care at home? No (P)      Do you take prescription medications? Yes (P)      Do you have prescription coverage? Yes (P)      Coverage [employer] (P)      Do you have any problems affording any of your prescribed medications? No (P)      Is the patient taking medications as prescribed? yes (P)      Who is going to help you get home at discharge? She will drive herself (P)      How do you get to doctors appointments? car, drives self (P)      Are you on dialysis? No (P)      Do you take coumadin? No (P)      Discharge Plan A Home (P)      Discharge Plan B Home with family (P)      DME Needed Upon Discharge  bedside commode (P)    Pt will need BSC, instructed that this is OOP expense.  Pt states she will purchase at [employer].    Discharge Plan discussed with: Patient (P)      Transition of Care Barriers None (P)         Physical Activity    On average, how many days per week do you engage in moderate to strenuous exercise (like a brisk walk)? 0 days (P)      On average, how many minutes do you engage in  exercise at this level? 0 min (P)         Financial Resource Strain    How hard is it for you to pay for the very basics like food, housing, medical care, and heating? Somewhat hard (P)         Housing Stability    In the last 12 months, was there a time when you were not able to pay the mortgage or rent on time? Yes (P)      At any time in the past 12 months, were you homeless or living in a shelter (including now)? Yes (P)    Pt declines resources       Transportation Needs    Has the lack of transportation kept you from medical appointments, meetings, work or from getting things needed for daily living? No (P)         Food Insecurity    Within the past 12 months, you worried that your food would run out before you got the money to buy more. Never true (P)      Within the past 12 months, the food you bought just didn't last and you didn't have money to get more. Never true (P)         Stress    Do you feel stress - tense, restless, nervous, or anxious, or unable to sleep at night because your mind is troubled all the time - these days? Rather much (P)         Social Isolation    How often do you feel lonely or isolated from those around you?  Sometimes (P)    Pt states she prays when she gets those feelings.       Alcohol Use    Q1: How often do you have a drink containing alcohol? Never (P)      Q2: How many drinks containing alcohol do you have on a typical day when you are drinking? Patient does not drink (P)      Q3: How often do you have six or more drinks on one occasion? Never (P)         Utilities    In the past 12 months has the electric, gas, oil, or water company threatened to shut off services in your home? No (P)         Health Literacy    How often do you need to have someone help you when you read instructions, pamphlets, or other written material from your doctor or pharmacy? Never (P)         OTHER    Name(s) of People in Home Timoteo Malone, daughter (P)                    Discharge Plan A and  Plan B have been determined by review of patient's clinical status, future medical and therapeutic needs, and coverage/benefits for post-acute care in coordination with multidisciplinary team members.    THERESA MartinezN, BS, RN, CCM

## 2024-08-23 NOTE — PHARMACY MED REC
"Admission Medication History     The home medication history was taken by Billie Brennan.    You may go to "Admission" then "Reconcile Home Medications" tabs to review and/or act upon these items.     The home medication list has been updated by the Pharmacy department.   Please read ALL comments highlighted in yellow.   Please address this information as you see fit.    Feel free to contact us if you have any questions or require assistance.          Medications listed below were obtained from: Patient/family and Analytic software- [employer] [facility]   Medication Sig    acetaminophen (TYLENOL) 500 MG tablet Take 1,000 mg by mouth daily as needed for Pain.    amLODIPine (NORVASC) 10 MG tablet Take 1 tablet (10 mg total) by mouth once daily.    ascorbic acid (VITAMIN C ORAL) Take 1 capsule by mouth once daily.    carvediloL (COREG) 25 MG tablet Take 1 tablet (25 mg total) by mouth 2 (two) times daily.    enoxaparin (LOVENOX) 120 mg/0.8 mL Syrg Push out 0.1 ml then Inject 0.7 mLs (105 mg total) into the skin 2 (two) times daily.    FLUoxetine 40 MG capsule Take 1 capsule (40 mg total) by mouth once daily.    folic acid (FOLVITE) 1 MG tablet Take 1 tablet (1 mg total) by mouth once daily.    furosemide (LASIX) 20 MG tablet Take 1 tablet (20 mg total) by mouth once daily for 7 days.    lactulose (CHRONULAC) 10 gram/15 mL solution Take 30 mLs (20 g total) by mouth every 6 (six) hours as needed (Constipation).    morphine (MS CONTIN) 15 MG 12 hr tablet Take 1 tablet (15 mg total) by mouth 2 (two) times daily. for 14 days    oxyCODONE-acetaminophen (PERCOCET)  mg per tablet Take 1 tablet by mouth every 6 (six) hours as needed for Pain.    promethazine (PHENERGAN) 25 MG tablet Take 1 tablet (25 mg total) by mouth every 6 (six) hours as needed for Nausea.    senna-docusate 8.6-50 mg (PERICOLACE) 8.6-50 mg per tablet Take 1 tablet by mouth daily as needed for Constipation.    tiZANidine (ZANAFLEX) 4 MG tablet Take " 1 tablet (4 mg total) by mouth every 8 (eight) hours as needed muscle spasms    albuterol (VENTOLIN HFA) 90 mcg/actuation inhaler Inhale 2 puffs into the lungs every 6 (six) hours as needed for Wheezing or Shortness of Breath. Rescue    [] cephALEXin (KEFLEX) 500 MG capsule Take 1 capsule (500 mg total) by mouth every 6 (six) hours. for 5 days    fluticasone propionate (FLONASE) 50 mcg/actuation nasal spray Instill 1 spray into each nostril everyday     gabapentin (NEURONTIN) 400 MG capsule Take 400 mg by mouth 3 (three) times daily.           Billie Brennan  EXT [id number]                 .

## 2024-08-23 NOTE — ASSESSMENT & PLAN NOTE
Pt had 2 episodes before admission and No recent sick contacts, Pt had recent hospitalization and abx use but on chronic opoid use   Consider possible infectious etiology like C diff     Plan  -Stool culture; workup for C diff

## 2024-08-23 NOTE — ASSESSMENT & PLAN NOTE
Nazanin Malone is a 44 y.o. F with history of multiple sickle cell pain crises who was readmitted yesterday with recurrent pain as well as continued pain at site of prior left sided port. US showed a 5.4cm complex fluid collection, non compressible.    - no overlying skin changes or drainage from site to suggest underlying abscess  - this likely represents persistent hematoma that was present during prior hospitalization  - no leukocytosis  - no indication for I&D at this point  - can attempt warm compresses or pressure dressings  - rest of care per primary team

## 2024-08-23 NOTE — CONSULTS
[facility] S[facility] ([place] [address])  General Surgery  Consult Note    Patient Name: Nazanin Malone  MRN: [id number]  Code Status: Full Code  Admission Date: 8/22/2024  Hospital Length of Stay: 0 days  Attending Physician: Chase aGsca III*  Primary Care Provider: Pee Montgomery MD    Patient information was obtained from patient, past medical records, and ER records.     Inpatient consult to General Surgery  Consult performed by: Sahra Lora MD  Consult ordered by: Alexa Mccauley DO        Subjective:     Principal Problem: Sickle cell pain crisis    History of Present Illness: Nazanin Malone is a 44 y.o. F with history of sickle cell disease and multiple episodes of pain crises who was recently admitted with a pain crisis and underwent left chest port removal due to IJ thrombus on 8/10. A few days later, she developed a hematoma on the left chest wall at the prior site that seemed to improve with manual compression at that time. She was discharged on 8/17 with a course of PO antibiotics due to concern for possible cellulitis related to this hematoma. She completed the antibiotics, but re presented to the hospital 8/22 with another acute pain crisis and was also continuing to have left chest pain. She states the pain never really resolved after discharge. She denies significant drainage from the overlying skin, or skin changes. She has had intermittent subjective fevers.    Workup with left chest US revealed an increase in the size of this chest wall fluid collection to 5cm from 3cm previously.     No current facility-administered medications on file prior to encounter.     Current Outpatient Medications on File Prior to Encounter   Medication Sig    acetaminophen (TYLENOL) 500 MG tablet Take 1,000 mg by mouth daily as needed for Pain.    amLODIPine (NORVASC) 10 MG tablet Take 1 tablet (10 mg total) by mouth once daily.    ascorbic acid (VITAMIN C ORAL) Take 1 capsule by mouth once daily.     carvediloL (COREG) 25 MG tablet Take 1 tablet (25 mg total) by mouth 2 (two) times daily.    enoxaparin (LOVENOX) 120 mg/0.8 mL Syrg Push out 0.1 ml then Inject 0.7 mLs (105 mg total) into the skin 2 (two) times daily.    FLUoxetine 40 MG capsule Take 1 capsule (40 mg total) by mouth once daily.    folic acid (FOLVITE) 1 MG tablet Take 1 tablet (1 mg total) by mouth once daily.    furosemide (LASIX) 20 MG tablet Take 1 tablet (20 mg total) by mouth once daily for 7 days.    lactulose (CHRONULAC) 10 gram/15 mL solution Take 30 mLs (20 g total) by mouth every 6 (six) hours as needed (Constipation).    morphine (MS CONTIN) 15 MG 12 hr tablet Take 1 tablet (15 mg total) by mouth 2 (two) times daily. for 14 days    oxyCODONE-acetaminophen (PERCOCET)  mg per tablet Take 1 tablet by mouth every 6 (six) hours as needed for Pain.    promethazine (PHENERGAN) 25 MG tablet Take 1 tablet (25 mg total) by mouth every 6 (six) hours as needed for Nausea.    senna-docusate 8.6-50 mg (PERICOLACE) 8.6-50 mg per tablet Take 1 tablet by mouth daily as needed for Constipation.    tiZANidine (ZANAFLEX) 4 MG tablet Take 1 tablet (4 mg total) by mouth every 8 (eight) hours as needed.    albuterol (VENTOLIN HFA) 90 mcg/actuation inhaler Inhale 2 puffs into the lungs every 6 (six) hours as needed for Wheezing or Shortness of Breath. Rescue    fluticasone propionate (FLONASE) 50 mcg/actuation nasal spray INSTILL 1 SPRAY INTO EACH NOSTRIL EVERY DAY    gabapentin (NEURONTIN) 400 MG capsule Take 400 mg by mouth 3 (three) times daily.       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    Asthma     Depression     Hypertension     Morbid obesity     Opioid dependence 4/16/2017    Pneumonia due to Streptococcus pneumoniae 4/25/2017    Right lower lobe pneumonia 4/29/2017     Sepsis due to Streptococcus pneumoniae 2017    Sickle cell-beta thalassemia disease with pain     Trigeminal neuralgia      Past Surgical History:   Procedure Laterality Date     SECTION      TONSILLECTOMY      TUBAL LIGATION       Family History       Problem Relation (Age of Onset)    Diabetes Mother    Heart disease Mother, Father          Tobacco Use    Smoking status: Never    Smokeless tobacco: Never   Substance and Sexual Activity    Alcohol use: No    Drug use: Yes     Types: Marijuana     Comment: periodically     Sexual activity: Not Currently     Birth control/protection: See Surgical Hx     Review of Systems   Constitutional:  Positive for fever. Negative for chills.   HENT: Negative.     Eyes: Negative.    Respiratory:  Negative for cough and shortness of breath.    Cardiovascular:  Positive for chest pain (chest wall pain).   Gastrointestinal:  Negative for abdominal pain, nausea and vomiting.   Genitourinary: Negative.    Musculoskeletal: Negative.    Skin: Negative.    Neurological:  Negative for dizziness and weakness.   Psychiatric/Behavioral: Negative.             Objective:     Vital Signs (Most Recent):  Temp: 98.8 °F (37.1 °C) (24 1157)  Pulse: 75 (24 1157)  Resp: 16 (24 1324)  BP: 115/71 (24 1157)  SpO2: 98 % (24 1157) Vital Signs (24h Range):  Temp:  [98 °F (36.7 °C)-99.5 °F (37.5 °C)] 98.8 °F (37.1 °C)  Pulse:  [64-88] 75  Resp:  [16-18] 16  SpO2:  [98 %-100 %] 98 %  BP: (100-158)/(65-99) 115/71     Weight: 109.5 kg (241 lb 4.8 oz)  Body mass index is 44.13 kg/m².     Physical Exam  Constitutional:       General: She is not in acute distress.     Appearance: Normal appearance.   HENT:      Head: Normocephalic and atraumatic.      Nose: Nose normal.      Mouth/Throat:      Mouth: Mucous membranes are moist.   Eyes:      Extraocular Movements: Extraocular movements intact.   Cardiovascular:      Rate and Rhythm: Normal rate.   Pulmonary:       Effort: Pulmonary effort is normal. No respiratory distress.   Abdominal:      Palpations: Abdomen is soft.      Tenderness: There is no abdominal tenderness.   Musculoskeletal:         General: Normal range of motion.      Cervical back: Normal range of motion.   Skin:     General: Skin is warm.      Coloration: Skin is not jaundiced.      Comments: 4-5 cm subcutaneous mass palpable at prior port site. Firm to palpation, moderately tender. No overlying skin changes or drainage. Incision healing well. Freely mobile.   Neurological:      General: No focal deficit present.      Mental Status: She is alert and oriented to person, place, and time.            I have reviewed all pertinent lab results within the past 24 hours.    Significant Diagnostics:  I have reviewed all pertinent imaging results/findings within the past 24 hours.    Assessment/Plan:     Fluid collection at surgical site  Nazanin Malone is a 44 y.o. F with history of multiple sickle cell pain crises who was readmitted yesterday with recurrent pain as well as continued pain at site of prior left sided port. US showed a 5.4cm complex fluid collection, non compressible.    - no overlying skin changes or drainage from site to suggest underlying abscess  - this likely represents persistent hematoma that was present during prior hospitalization  - no leukocytosis  - no indication for I&D at this point  - can attempt warm compresses or pressure dressings  - rest of care per primary team          Sahra Lora MD  General Surgery  [facility] - Med Surg ([facility])

## 2024-08-23 NOTE — PLAN OF CARE
Readmission assessment done, did not save in system.  Pt states she is readmitted for the same issue.  She started feeling bad about 2-3 days after she was d/c'd from hospital last time.    Discharge Plan A and Plan B have been determined by review of patient's clinical status, future medical and therapeutic needs, and coverage/benefits for post-acute care in coordination with multidisciplinary team members.    MAYELA Martinez, BS, RN, CCM

## 2024-08-23 NOTE — SUBJECTIVE & OBJECTIVE
Interval History: Patient states that her pain was controlled strength wise, but she does not like how she has to wake up to press it. She states if she does not press it, she wakes up in pain and has to ask for breakthrough pain medicine. Patient still has pain at the Left port site worsened with palpation. Patient denies increased dyspnea. She also denies vomiting or any BM.      Objective:   Though she has constantly been hypertensive throughout stay.  Vital Signs (Most Recent):  Temp: 98.8 °F (37.1 °C) (08/23/24 1157)  Pulse: 75 (08/23/24 1157)  Resp: 16 (08/23/24 1324)  BP: 115/71 (08/23/24 1157)  SpO2: 98 % (08/23/24 1157) Vital Signs (24h Range):  Temp:  [98 °F (36.7 °C)-98.8 °F (37.1 °C)] 98.8 °F (37.1 °C)  Pulse:  [64-84] 75  Resp:  [16-18] 16  SpO2:  [98 %-100 %] 98 %  BP: (100-158)/(65-96) 115/71     Weight: 109.5 kg (241 lb 4.8 oz)  Body mass index is 44.13 kg/m².  No intake or output data in the 24 hours ending 08/23/24 1557        Physical Exam  Constitutional:       Appearance: She is not diaphoretic.   HENT:      Head: Normocephalic and atraumatic.   Cardiovascular:      Rate and Rhythm: Normal rate.      Heart sounds: Normal heart sounds.   Pulmonary:      Effort: Pulmonary effort is normal.      Breath sounds: No wheezing or rales.      Comments: Has port on the right chest.  Has undefined collection/mass on where the old left port was  Abdominal:      General: Abdomen is flat. There is no distension.      Palpations: Abdomen is soft.      Tenderness: There is abdominal tenderness. There is no right CVA tenderness or left CVA tenderness.   Musculoskeletal:         General: Deformity present. No swelling.      Comments: Right lower leg fasciotomy   Skin:     General: Skin is warm.   Neurological:      Mental Status: She is alert.             Significant Labs: All pertinent labs within the past 24 hours have been reviewed.  CBC:   Recent Labs   Lab 08/22/24  1004 08/23/24  0442   WBC 8.52 7.54   HGB  10.5* 9.6*   HCT 31.5* 29.8*   * 390     CMP:   Recent Labs   Lab 08/22/24  1004 08/23/24  0442    139   K 3.6 3.8    109   CO2 22* 24    78   BUN 6 7   CREATININE 0.9 1.0   CALCIUM 10.2 9.5   PROT 8.2 6.9   ALBUMIN 4.0 3.4*   BILITOT 0.5 0.7   ALKPHOS 81 65   AST 30 32   ALT 24 20   ANIONGAP 10 6*       Significant Imaging: I have reviewed all pertinent imaging results/findings within the past 24 hours.  8/22/24 US Soft Tissue axilla Left Impression: Interval increase in size of a complex subcutaneous collection in the left upper chest, measuring up to 5.4 cm.

## 2024-08-23 NOTE — ED NOTES
LOC: The patient is awake, alert, aware of environment with an appropriate affect. Oriented x4, speaking appropriately  APPEARANCE: Pt resting comfortably, in no acute distress, pt is clean and well groomed, clothing properly fastened  SKIN:The skin is warm and dry, color consistent with ethnicity, patient has normal skin turgor and moist mucus membranes, no bruising noted   RESPIRATORY:Airway is open and patent, respirations are spontaneous, patient has a normal effort and rate, no accessory muscle use noted.  CARDIAC: Normal rate and rhythm, no peripheral edema noted, capillary refill < 3 seconds, bilateral radial pulses 2+. +CP  ABDOMEN: Soft, non tender, non distended. Bowel sounds present x 4 quadrants. N/V   NEUROLOGIC: PERRLA, facial expression is symmetrical, patient moving all extremities spontaneously, normal sensation in all extremities when touched with a finger.  Follows all commands appropriately  MUSCULOSKELETAL: Patient moving all extremities spontaneously, no obvious swelling or deformities noted.        
Assumed care of the patient. Report received from Laila BLANCO. Pt on continuous cardiac monitoring, continuous pulse oximetry, and automatic BP cuff cycling Q 30 min. Pt in hospital gown, side rails up X2, bed low and locked, and call light is placed within reach. No family/visitors at bedside at this time. Pt denies any complaints or needs.   
Bed: 36  Expected date: 8/22/24  Expected time: 11:26 AM  Means of arrival:   Comments:  CCR 3  
Bed: EDOU06  Expected date:   Expected time:   Means of arrival:   Comments:  36  
Hospital med at bedside  
Nurses Note -- 4 Eyes      8/22/2024   8:03 PM      Skin assessed during: Admit      [] No Altered Skin Integrity Present    []Prevention Measures Documented      [x] Yes- Altered Skin Integrity Present or Discovered   [] LDA Added if Not in Epic (Describe Wound)   [] New Altered Skin Integrity was Present on Admit and Documented in LDA   [] Wound Image Taken    Wound Care Consulted? No    Attending Nurse:  Agnieszka Krause RN     Second RN/Staff Member:  Melonie BLANCO     
PA notified pt requesting pain medications.   
Patient identifiers verified and correct for Nazanin Encalade  LOC: The patient is awake, alert and aware of environment with an appropriate affect, the patient is oriented x 3 and speaking appropriately.   APPEARANCE: Patient appears comfortable and in no acute distress, patient is clean and well groomed.  SKIN: The skin is warm and dry, color consistent with ethnicity, patient has normal skin turgor and moist mucus membranes, skin intact, no breakdown or bruising noted.   MUSCULOSKELETAL: Patient moving all extremities spontaneously, no swelling noted.  RESPIRATORY: Airway is open and patent, respirations are spontaneous, patient has a normal effort and rate, no accessory muscle.  CARDIAC: Pt placed on cardiac monitor. Patient has a normal rate and regular rhythm, no edema noted, capillary refill < 3 seconds.   GASTRO: Soft.  Generalized tenderness.   : Pt denies any pain.  Reports frequent urination.   NEURO: Pt opens eyes spontaneously, behavior appropriate to situation, follows commands, facial expression symmetrical, bilateral hand grasp equal and even, purposeful motor response noted, normal sensation in all extremities when touched with a finger.        
Promethazine requested from pharmacy.   
Pt in ultrasound. Will given pain medication when patient returns.   
rolling walker
rolling walker

## 2024-08-23 NOTE — ASSESSMENT & PLAN NOTE
Likely secondary to SC crisis pain  Pt previously responded well to compazine and promethazine but poorly to zofran    Plan:  -Start promethazine 25mg Q6H PRN, and compazine 2.5mg Q6H PRN

## 2024-08-23 NOTE — H&P
[facility] Dept  [facility] Medicine  History & Physical    Patient Name: Nazanin Malone  MRN: [id number]  Patient Class: OP- Observation  Admission Date: 8/22/2024  Attending Physician: Chase Gasca III*   Primary Care Provider: Pee Montgomery MD         Patient information was obtained from patient and ER records.     Subjective:     Principal Problem:Sickle cell pain crisis    Chief Complaint:   Chief Complaint   Patient presents with    Chest Pain     Sickle cell. Reports pain in chest, nausea, and vomiting.     Abdominal Pain        HPI: 33yo F with hx of sickle cell beta thalassemia disease, HTN, asthma, depression, PE comes in to ED with chest pain and abdominal pain. Patient also mentions associated diarrhea, bilious vomiting, productive cough with clear sputum, decreased appetite, and slight dyspnea at rest. Patient was recently discharged for a sickle cell crisis, PE, UTI and feels like this sickle cell again. She is adherent to the Lovenox and had almost finished course of Keflex except for the last dose which she threw up.     Patient states the abdominal pain is diffuse but worse on the RUQ and with palpation. Patient has some diffuse chest pain that is worsened with palpation around especially around the port sites. Patient states all this symptoms she is experiencing started yesterday. Patient currently takes MS contin BID and Percocet Q6H for pain and promethazine for nausea. She was give lasix from her last admission due to to upper extremity edema and currently does not take hydroxyurea but expresses wishes to take it. Patient denies any dysuria and hematuria or any recent stressors that could have caused this latest flare of sickle cell.     In the ED, CBC, CMP, CXR and RUQ US was ordered and Dilaudid and Morphine were given for pain control.      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     Current Outpatient Medications on File Prior to Encounter   Medication Sig    acetaminophen (TYLENOL) 500 MG tablet Take 1,000 mg by mouth daily as needed for Pain.    amLODIPine (NORVASC) 10 MG tablet Take 1 tablet (10 mg total) by mouth once daily.    carvediloL (COREG) 25 MG tablet Take 1 tablet (25 mg total) by mouth 2 (two) times daily.    enoxaparin (LOVENOX) 120 mg/0.8 mL Syrg Push out 0.1 ml then Inject 0.7 mLs (105 mg total) into the skin 2 (two) times daily.    FLUoxetine 40 MG capsule Take 1 capsule (40 mg total) by mouth once daily.    folic acid (FOLVITE) 1 MG tablet Take 1 tablet (1 mg total) by mouth once daily.    furosemide (LASIX) 20 MG tablet Take 1 tablet (20 mg total) by mouth once daily for 7 days.    lactulose (CHRONULAC) 10 gram/15 mL solution Take 30 mLs (20 g total) by mouth every 6 (six) hours as needed (Constipation).    morphine (MS CONTIN) 15 MG 12 hr tablet Take 1 tablet (15 mg total) by mouth 2 (two) times daily. for 14 days    oxyCODONE-acetaminophen (PERCOCET)  mg per tablet Take 1 tablet by mouth every 6 (six) hours as needed for Pain.    promethazine (PHENERGAN) 25 MG tablet Take 1 tablet (25 mg total) by mouth every 6 (six) hours as needed for Nausea.    senna-docusate 8.6-50 mg (PERICOLACE) 8.6-50 mg per tablet Take 1 tablet by mouth daily as needed for Constipation.     tiZANidine (ZANAFLEX) 4 MG tablet Take 1 tablet (4 mg total) by mouth every 8 (eight) hours as needed.    albuterol (VENTOLIN HFA) 90 mcg/actuation inhaler Inhale 2 puffs into the lungs every 6 (six) hours as needed for Wheezing or Shortness of Breath. Rescue    ascorbic acid (VITAMIN C ORAL) Take 1 capsule by mouth once daily.    [] cephALEXin (KEFLEX) 500 MG capsule Take 1 capsule (500 mg total) by mouth every 6 (six) hours. for 5 days    fluticasone propionate (FLONASE) 50 mcg/actuation nasal spray INSTILL 1 SPRAY INTO EACH NOSTRIL EVERY DAY     Family History       Problem Relation (Age of Onset)    Diabetes Mother    Heart disease Mother, Father          Tobacco Use    Smoking status: Never    Smokeless tobacco: Never   Substance and Sexual Activity    Alcohol use: No    Drug use: Yes     Types: Marijuana     Comment: periodically     Sexual activity: Not Currently     Birth control/protection: See Surgical Hx     Review of Systems   Constitutional:  Positive for fever.        Subjective fever around 99 F    Respiratory:  Positive for cough and shortness of breath. Negative for wheezing and stridor.         Productive cough with clear thick sputum   Cardiovascular:  Positive for chest pain and palpitations. Negative for leg swelling.   Gastrointestinal:  Positive for abdominal pain, diarrhea, nausea and vomiting.   Genitourinary:  Negative for dysuria and hematuria.   Musculoskeletal:  Positive for myalgias.     Objective:     Vital Signs (Most Recent):  Temp: 99.5 °F (37.5 °C) (24 1404)  Pulse: 84 (24 183)  Resp: 16 (24)  BP: (!) 144/88 (24)  SpO2: 100 % (24) Vital Signs (24h Range):  Temp:  [99.4 °F (37.4 °C)-100.1 °F (37.8 °C)] 99.5 °F (37.5 °C)  Pulse:  [80-88] 84  Resp:  [16-20] 16  SpO2:  [97 %-100 %] 100 %  BP: (143-173)/(83-99) 144/88     Weight: 109.5 kg (241 lb 4.8 oz)  Body mass index is 44.13 kg/m².     Physical Exam  Constitutional:        Appearance: She is not diaphoretic.   HENT:      Head: Normocephalic and atraumatic.      Mouth/Throat:      Mouth: Mucous membranes are dry.   Cardiovascular:      Rate and Rhythm: Normal rate.      Heart sounds: Normal heart sounds.   Pulmonary:      Effort: Pulmonary effort is normal.      Breath sounds: No wheezing or rales.      Comments: Has port on the right chest.  Has undefined collection/mass on where the old left port was  Abdominal:      General: Abdomen is flat.      Palpations: Abdomen is soft.      Tenderness: There is abdominal tenderness. There is guarding. There is no right CVA tenderness or left CVA tenderness.   Musculoskeletal:         General: Deformity present. No swelling.      Comments: Right lower leg fasciotomy   Skin:     General: Skin is warm.   Neurological:      Mental Status: She is alert.                Significant Labs: All pertinent labs within the past 24 hours have been reviewed.  CBC:   Recent Labs   Lab 08/22/24  1004   WBC 8.52   HGB 10.5*   HCT 31.5*   *     CMP:   Recent Labs   Lab 08/22/24  1004      K 3.6      CO2 22*      BUN 6   CREATININE 0.9   CALCIUM 10.2   PROT 8.2   ALBUMIN 4.0   BILITOT 0.5   ALKPHOS 81   AST 30   ALT 24   ANIONGAP 10       Significant Imaging: I have reviewed all pertinent imaging results/findings within the past 24 hours.  CXR 8/22/24 report showed no abnormality or changes since last examination in 7/8/24.   US abdomen report showed no acute /significant abnormalities.  Assessment/Plan:     * Sickle cell pain crisis  44 year old female with history of SC anemia with history of acute chest, beta thalassemia, HTN, obesity, iron deficiency anemia, hx of PE on apixaban, RLE fasciotomy, and recent hospitalization for bacteremia s/p antibiotic course and recent sickle cell pain crisis.     Plan  -LR IV fluids  -Pain control:              -Acetaminophen 1g PRN              -MS contin (morphine) 15mg BID              -PCA  hydromorphone, bolus 0.4mg PRN and lockout time 10min, no continuous dose -titrate PRN   -Start hydroxyuea 15mg/kg QD and titrate PRN                -consider PO benadryl 25mg PRN for pruritus              -Bowel regimen: senna PRN, miralax PRN  -Avoid NSAIDs due to allergies          Diarrhea  Pt had 2 episodes  and No recent sick contacts, Pt had recent hospitalization and abx use but on chronic opoid use   Consider possible infectious etiology like C diff     Plan  -Stool culture; workup for C diff     Fluid collection at surgical site  Patient had recently gotten port on left chest removed and admits to pain on site.   Repeat US report shows: Interval increase in size of a complex subcutaneous collection in the left upper chest, measuring up to 5.4 cm.     Plan:  Consult Gen Surg for I&D  Start Doxycycline 100 BID for 5 days          Anemia  -Baseline Hgb is around 9  -Macrocytic MCV; most likely 2/2 to Beta Thalassemia   Recent Labs     08/22/24  1004   HGB 10.5*   HCT 31.5*     Plan  - Monitor serial CBC: Daily  - Transfuse PRBC if patient becomes hemodynamically unstable, symptomatic or H/H drops below 7/21.    Nausea and vomiting  Likely secondary to SC crisis pain  Pt previously responded well to compazine and promethazine but poorly to zofran    Plan:  -Start promethazine 25mg Q6H PRN, and compazine 2.5mg Q6H PRN      History of pulmonary embolism  -Therapeutic dose of Enoxaparin due to hx of clots and PE    Hypertension  -resume home meds below    Hypertension Medications               amLODIPine (NORVASC) 10 MG tablet Take 1 tablet (10 mg total) by mouth once daily.    carvediloL (COREG) 25 MG tablet Take 1 tablet (25 mg total) by mouth 2 (two) times daily.                 Anxiety  -continue SSRI        VTE Risk Mitigation (From admission, onward)           Ordered     enoxaparin injection 105 mg  2 times daily         08/22/24 1501     IP VTE HIGH RISK PATIENT  Once         08/22/24 1326     Place  sequential compression device  Until discontinued         08/22/24 1326                           On 08/22/2024, patient should be placed in hospital observation services under my care in collaboration with Dr. Gasca.         Lin Torrez MD  Department of Hospital Medicine  [facility] - Emergency Dept

## 2024-08-23 NOTE — SUBJECTIVE & OBJECTIVE
No current facility-administered medications on file prior to encounter.     Current Outpatient Medications on File Prior to Encounter   Medication Sig    acetaminophen (TYLENOL) 500 MG tablet Take 1,000 mg by mouth daily as needed for Pain.    amLODIPine (NORVASC) 10 MG tablet Take 1 tablet (10 mg total) by mouth once daily.    ascorbic acid (VITAMIN C ORAL) Take 1 capsule by mouth once daily.    carvediloL (COREG) 25 MG tablet Take 1 tablet (25 mg total) by mouth 2 (two) times daily.    enoxaparin (LOVENOX) 120 mg/0.8 mL Syrg Push out 0.1 ml then Inject 0.7 mLs (105 mg total) into the skin 2 (two) times daily.    FLUoxetine 40 MG capsule Take 1 capsule (40 mg total) by mouth once daily.    folic acid (FOLVITE) 1 MG tablet Take 1 tablet (1 mg total) by mouth once daily.    furosemide (LASIX) 20 MG tablet Take 1 tablet (20 mg total) by mouth once daily for 7 days.    lactulose (CHRONULAC) 10 gram/15 mL solution Take 30 mLs (20 g total) by mouth every 6 (six) hours as needed (Constipation).    morphine (MS CONTIN) 15 MG 12 hr tablet Take 1 tablet (15 mg total) by mouth 2 (two) times daily. for 14 days    oxyCODONE-acetaminophen (PERCOCET)  mg per tablet Take 1 tablet by mouth every 6 (six) hours as needed for Pain.    promethazine (PHENERGAN) 25 MG tablet Take 1 tablet (25 mg total) by mouth every 6 (six) hours as needed for Nausea.    senna-docusate 8.6-50 mg (PERICOLACE) 8.6-50 mg per tablet Take 1 tablet by mouth daily as needed for Constipation.    tiZANidine (ZANAFLEX) 4 MG tablet Take 1 tablet (4 mg total) by mouth every 8 (eight) hours as needed.    albuterol (VENTOLIN HFA) 90 mcg/actuation inhaler Inhale 2 puffs into the lungs every 6 (six) hours as needed for Wheezing or Shortness of Breath. Rescue    fluticasone propionate (FLONASE) 50 mcg/actuation nasal spray INSTILL 1 SPRAY INTO EACH NOSTRIL EVERY DAY    gabapentin (NEURONTIN) 400 MG capsule Take 400 mg by mouth 3 (three) times daily.       Review  of patient's allergies indicates:  No Known Allergies    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    Asthma     Depression     Hypertension     Morbid obesity     Opioid dependence 2017    Pneumonia due to Streptococcus pneumoniae 2017    Right lower lobe pneumonia 2017    Sepsis due to Streptococcus pneumoniae 2017    Sickle cell-beta thalassemia disease with pain     Trigeminal neuralgia      Past Surgical History:   Procedure Laterality Date     SECTION      TONSILLECTOMY      TUBAL LIGATION       Family History       Problem Relation (Age of Onset)    Diabetes Mother    Heart disease Mother, Father          Tobacco Use    Smoking status: Never    Smokeless tobacco: Never   Substance and Sexual Activity    Alcohol use: No    Drug use: Yes     Types: Marijuana     Comment: periodically     Sexual activity: Not Currently     Birth control/protection: See Surgical Hx     Review of Systems   Constitutional:  Positive for fever. Negative for chills.   HENT: Negative.     Eyes: Negative.    Respiratory:  Negative for cough and shortness of breath.    Cardiovascular:  Positive for chest pain (chest wall pain).   Gastrointestinal:  Negative for abdominal pain, nausea and vomiting.   Genitourinary: Negative.    Musculoskeletal: Negative.    Skin: Negative.    Neurological:  Negative for dizziness and weakness.   Psychiatric/Behavioral: Negative.             Objective:     Vital Signs (Most Recent):  Temp: 98.8 °F (37.1 °C) (24 1157)  Pulse: 75 (24 1157)  Resp: 16 (24 1324)  BP: 115/71 (24 1157)  SpO2: 98 % (24 1157) Vital Signs (24h Range):  Temp:  [98 °F (36.7 °C)-99.5 °F (37.5 °C)] 98.8 °F (37.1 °C)  Pulse:  [64-88] 75  Resp:  [16-18] 16  SpO2:  [98 %-100 %] 98 %  BP: (100-158)/(65-99) 115/71     Weight: 109.5 kg (241 lb 4.8 oz)  Body mass index is 44.13  kg/m².     Physical Exam  Constitutional:       General: She is not in acute distress.     Appearance: Normal appearance.   HENT:      Head: Normocephalic and atraumatic.      Nose: Nose normal.      Mouth/Throat:      Mouth: Mucous membranes are moist.   Eyes:      Extraocular Movements: Extraocular movements intact.   Cardiovascular:      Rate and Rhythm: Normal rate.   Pulmonary:      Effort: Pulmonary effort is normal. No respiratory distress.   Abdominal:      Palpations: Abdomen is soft.      Tenderness: There is no abdominal tenderness.   Musculoskeletal:         General: Normal range of motion.      Cervical back: Normal range of motion.   Skin:     General: Skin is warm.      Coloration: Skin is not jaundiced.      Comments: 4-5 cm subcutaneous mass palpable at prior port site. Firm to palpation, moderately tender. No overlying skin changes or drainage. Incision healing well. Freely mobile.   Neurological:      General: No focal deficit present.      Mental Status: She is alert and oriented to person, place, and time.            I have reviewed all pertinent lab results within the past 24 hours.    Significant Diagnostics:  I have reviewed all pertinent imaging results/findings within the past 24 hours.

## 2024-08-23 NOTE — ASSESSMENT & PLAN NOTE
-Baseline Hgb is around 9  -Macrocytic MCV; most likely 2/2 to Beta Thalassemia   Recent Labs     08/22/24  1004 08/23/24  0442   HGB 10.5* 9.6*   HCT 31.5* 29.8*     Plan  - Monitor serial CBC: Daily  - Transfuse PRBC if patient becomes hemodynamically unstable, symptomatic or H/H drops below 7/21.

## 2024-08-23 NOTE — HPI
Nazanin Malone is a 44 y.o. F with history of sickle cell disease and multiple episodes of pain crises who was recently admitted with a pain crisis and underwent left chest port removal due to IJ thrombus on 8/10. A few days later, she developed a hematoma on the left chest wall at the prior site that seemed to improve with manual compression at that time. She was discharged on 8/17 with a course of PO antibiotics due to concern for possible cellulitis related to this hematoma. She completed the antibiotics, but re presented to the hospital 8/22 with another acute pain crisis and was also continuing to have left chest pain. She states the pain never really resolved after discharge. She denies significant drainage from the overlying skin, or skin changes. She has had intermittent subjective fevers.    Workup with left chest US revealed an increase in the size of this chest wall fluid collection to 5cm from 3cm previously.

## 2024-08-24 LAB
ALBUMIN SERPL BCP-MCNC: 3.4 G/DL (ref 3.5–5.2)
ALP SERPL-CCNC: 71 U/L (ref 55–135)
ALT SERPL W/O P-5'-P-CCNC: 25 U/L (ref 10–44)
ANION GAP SERPL CALC-SCNC: 8 MMOL/L (ref 8–16)
AST SERPL-CCNC: 42 U/L (ref 10–40)
BASOPHILS # BLD AUTO: 0.07 K/UL (ref 0–0.2)
BASOPHILS NFR BLD: 1 % (ref 0–1.9)
BILIRUB SERPL-MCNC: 0.6 MG/DL (ref 0.1–1)
BUN SERPL-MCNC: 15 MG/DL (ref 6–20)
CALCIUM SERPL-MCNC: 9.8 MG/DL (ref 8.7–10.5)
CHLORIDE SERPL-SCNC: 111 MMOL/L (ref 95–110)
CO2 SERPL-SCNC: 22 MMOL/L (ref 23–29)
CREAT SERPL-MCNC: 1.2 MG/DL (ref 0.5–1.4)
DIFFERENTIAL METHOD BLD: ABNORMAL
EOSINOPHIL # BLD AUTO: 0.4 K/UL (ref 0–0.5)
EOSINOPHIL NFR BLD: 5.2 % (ref 0–8)
ERYTHROCYTE [DISTWIDTH] IN BLOOD BY AUTOMATED COUNT: 21.1 % (ref 11.5–14.5)
EST. GFR  (NO RACE VARIABLE): 56.5 ML/MIN/1.73 M^2
FACT X PPP CHRO-ACNC: 0.91 IU/ML (ref 0.3–0.7)
GLUCOSE SERPL-MCNC: 78 MG/DL (ref 70–110)
HCT VFR BLD AUTO: 31.4 % (ref 37–48.5)
HGB BLD-MCNC: 10.2 G/DL (ref 12–16)
IMM GRANULOCYTES # BLD AUTO: 0.02 K/UL (ref 0–0.04)
IMM GRANULOCYTES NFR BLD AUTO: 0.3 % (ref 0–0.5)
LYMPHOCYTES # BLD AUTO: 3.8 K/UL (ref 1–4.8)
LYMPHOCYTES NFR BLD: 53 % (ref 18–48)
MAGNESIUM SERPL-MCNC: 2 MG/DL (ref 1.6–2.6)
MCH RBC QN AUTO: 25.6 PG (ref 27–31)
MCHC RBC AUTO-ENTMCNC: 32.5 G/DL (ref 32–36)
MCV RBC AUTO: 79 FL (ref 82–98)
MONOCYTES # BLD AUTO: 1 K/UL (ref 0.3–1)
MONOCYTES NFR BLD: 14.4 % (ref 4–15)
NEUTROPHILS # BLD AUTO: 1.9 K/UL (ref 1.8–7.7)
NEUTROPHILS NFR BLD: 26.1 % (ref 38–73)
NRBC BLD-RTO: 1 /100 WBC
PHOSPHATE SERPL-MCNC: 4.5 MG/DL (ref 2.7–4.5)
PLATELET # BLD AUTO: 405 K/UL (ref 150–450)
PMV BLD AUTO: 9.9 FL (ref 9.2–12.9)
POTASSIUM SERPL-SCNC: 4.3 MMOL/L (ref 3.5–5.1)
PROT SERPL-MCNC: 7 G/DL (ref 6–8.4)
RBC # BLD AUTO: 3.98 M/UL (ref 4–5.4)
SODIUM SERPL-SCNC: 141 MMOL/L (ref 136–145)
WBC # BLD AUTO: 7.24 K/UL (ref 3.9–12.7)

## 2024-08-24 PROCEDURE — 85520 HEPARIN ASSAY: CPT | Performed by: FAMILY MEDICINE

## 2024-08-24 PROCEDURE — 11000001 HC ACUTE MED/SURG PRIVATE ROOM

## 2024-08-24 PROCEDURE — 27000207 HC ISOLATION

## 2024-08-24 PROCEDURE — 83735 ASSAY OF MAGNESIUM: CPT

## 2024-08-24 PROCEDURE — 25000003 PHARM REV CODE 250

## 2024-08-24 PROCEDURE — 63600175 PHARM REV CODE 636 W HCPCS

## 2024-08-24 PROCEDURE — 80053 COMPREHEN METABOLIC PANEL: CPT

## 2024-08-24 PROCEDURE — 36415 COLL VENOUS BLD VENIPUNCTURE: CPT | Performed by: FAMILY MEDICINE

## 2024-08-24 PROCEDURE — 84100 ASSAY OF PHOSPHORUS: CPT

## 2024-08-24 PROCEDURE — 36415 COLL VENOUS BLD VENIPUNCTURE: CPT

## 2024-08-24 PROCEDURE — 85025 COMPLETE CBC W/AUTO DIFF WBC: CPT

## 2024-08-24 RX ADMIN — FLUOXETINE HYDROCHLORIDE 40 MG: 20 CAPSULE ORAL at 09:08

## 2024-08-24 RX ADMIN — PROCHLORPERAZINE EDISYLATE 2.5 MG: 5 INJECTION INTRAMUSCULAR; INTRAVENOUS at 10:08

## 2024-08-24 RX ADMIN — OXYCODONE HYDROCHLORIDE 10 MG: 10 TABLET ORAL at 12:08

## 2024-08-24 RX ADMIN — GABAPENTIN 400 MG: 300 CAPSULE ORAL at 09:08

## 2024-08-24 RX ADMIN — HYDROXYUREA 1000 MG: 500 CAPSULE ORAL at 09:08

## 2024-08-24 RX ADMIN — AMLODIPINE BESYLATE 10 MG: 10 TABLET ORAL at 09:08

## 2024-08-24 RX ADMIN — HYDROMORPHONE HYDROCHLORIDE 3 MG: 2 INJECTION INTRAMUSCULAR; INTRAVENOUS; SUBCUTANEOUS at 02:08

## 2024-08-24 RX ADMIN — MORPHINE SULFATE 15 MG: 15 TABLET, EXTENDED RELEASE ORAL at 09:08

## 2024-08-24 RX ADMIN — SODIUM CHLORIDE, POTASSIUM CHLORIDE, SODIUM LACTATE AND CALCIUM CHLORIDE: 600; 310; 30; 20 INJECTION, SOLUTION INTRAVENOUS at 02:08

## 2024-08-24 RX ADMIN — HYDROMORPHONE HYDROCHLORIDE 3 MG: 2 INJECTION INTRAMUSCULAR; INTRAVENOUS; SUBCUTANEOUS at 09:08

## 2024-08-24 RX ADMIN — HYDROMORPHONE HYDROCHLORIDE 3 MG: 2 INJECTION INTRAMUSCULAR; INTRAVENOUS; SUBCUTANEOUS at 07:08

## 2024-08-24 RX ADMIN — PROMETHAZINE HYDROCHLORIDE 25 MG: 12.5 TABLET ORAL at 09:08

## 2024-08-24 RX ADMIN — DOXYCYCLINE HYCLATE 100 MG: 100 TABLET, COATED ORAL at 08:08

## 2024-08-24 RX ADMIN — CARVEDILOL 25 MG: 25 TABLET, FILM COATED ORAL at 09:08

## 2024-08-24 RX ADMIN — ENOXAPARIN SODIUM 105 MG: 120 INJECTION SUBCUTANEOUS at 10:08

## 2024-08-24 RX ADMIN — DIPHENHYDRAMINE HYDROCHLORIDE 25 MG: 50 INJECTION, SOLUTION INTRAMUSCULAR; INTRAVENOUS at 06:08

## 2024-08-24 RX ADMIN — TIZANIDINE 4 MG: 2 TABLET ORAL at 12:08

## 2024-08-24 RX ADMIN — HYDROMORPHONE HYDROCHLORIDE 3 MG: 2 INJECTION INTRAMUSCULAR; INTRAVENOUS; SUBCUTANEOUS at 06:08

## 2024-08-24 RX ADMIN — HYDROMORPHONE HYDROCHLORIDE 3 MG: 2 INJECTION INTRAMUSCULAR; INTRAVENOUS; SUBCUTANEOUS at 01:08

## 2024-08-24 RX ADMIN — DOXYCYCLINE HYCLATE 100 MG: 100 TABLET, COATED ORAL at 09:08

## 2024-08-24 RX ADMIN — FOLIC ACID 1 MG: 1 TABLET ORAL at 09:08

## 2024-08-24 RX ADMIN — DIPHENHYDRAMINE HYDROCHLORIDE 25 MG: 50 INJECTION, SOLUTION INTRAMUSCULAR; INTRAVENOUS at 10:08

## 2024-08-24 RX ADMIN — GABAPENTIN 400 MG: 300 CAPSULE ORAL at 08:08

## 2024-08-24 RX ADMIN — ENOXAPARIN SODIUM 105 MG: 120 INJECTION SUBCUTANEOUS at 08:08

## 2024-08-24 RX ADMIN — DIPHENHYDRAMINE HYDROCHLORIDE 25 MG: 50 INJECTION, SOLUTION INTRAMUSCULAR; INTRAVENOUS at 01:08

## 2024-08-24 RX ADMIN — MORPHINE SULFATE 15 MG: 15 TABLET, EXTENDED RELEASE ORAL at 08:08

## 2024-08-24 RX ADMIN — DIPHENHYDRAMINE HYDROCHLORIDE 25 MG: 50 INJECTION, SOLUTION INTRAMUSCULAR; INTRAVENOUS at 09:08

## 2024-08-24 RX ADMIN — HYDROMORPHONE HYDROCHLORIDE 3 MG: 2 INJECTION INTRAMUSCULAR; INTRAVENOUS; SUBCUTANEOUS at 10:08

## 2024-08-24 RX ADMIN — DIPHENHYDRAMINE HYDROCHLORIDE 25 MG: 50 INJECTION, SOLUTION INTRAMUSCULAR; INTRAVENOUS at 07:08

## 2024-08-24 RX ADMIN — DIPHENHYDRAMINE HYDROCHLORIDE 25 MG: 50 INJECTION, SOLUTION INTRAMUSCULAR; INTRAVENOUS at 02:08

## 2024-08-24 RX ADMIN — OXYCODONE HYDROCHLORIDE 10 MG: 10 TABLET ORAL at 08:08

## 2024-08-24 RX ADMIN — TIZANIDINE 4 MG: 2 TABLET ORAL at 08:08

## 2024-08-24 RX ADMIN — CARVEDILOL 25 MG: 25 TABLET, FILM COATED ORAL at 08:08

## 2024-08-24 NOTE — PROGRESS NOTES
[facility] Med Surg ([place] [id number])  General Surgery  Progress Note    Subjective:     History of Present Illness:  Nazanin Malone is a 44 y.o. F with history of sickle cell disease and multiple episodes of pain crises who was recently admitted with a pain crisis and underwent left chest port removal due to IJ thrombus on 8/10. A few days later, she developed a hematoma on the left chest wall at the prior site that seemed to improve with manual compression at that time. She was discharged on 8/17 with a course of PO antibiotics due to concern for possible cellulitis related to this hematoma. She completed the antibiotics, but re presented to the hospital 8/22 with another acute pain crisis and was also continuing to have left chest pain. She states the pain never really resolved after discharge. She denies significant drainage from the overlying skin, or skin changes. She has had intermittent subjective fevers.    Workup with left chest US revealed an increase in the size of this chest wall fluid collection to 5cm from 3cm previously.     Post-Op Info:  * No surgery found *         Interval History: NAEO. AF, HDS. Hbg stable. Pain stable.     Medications:  Continuous Infusions:   lactated ringers   Intravenous Continuous 50 mL/hr at 08/22/24 1637 New Bag at 08/22/24 1637     Scheduled Meds:   amLODIPine  10 mg Oral Daily    carvediloL  25 mg Oral BID    doxycycline  100 mg Oral Q12H    enoxaparin  105 mg Subcutaneous BID    FLUoxetine  40 mg Oral Daily    folic acid  1 mg Oral Daily    gabapentin  400 mg Oral BID    HYDROmorphone  3 mg Intravenous Q4H    hydroxyurea  1,000 mg Oral Daily    morphine  15 mg Oral BID     PRN Meds:  Current Facility-Administered Medications:     acetaminophen, 1,000 mg, Oral, Daily PRN    dextrose 10%, 12.5 g, Intravenous, PRN    dextrose 10%, 25 g, Intravenous, PRN    diphenhydrAMINE, 25 mg, Intravenous, Q4H PRN    glucagon (human recombinant), 1 mg, Intramuscular, PRN    glucose,  16 g, Oral, PRN    glucose, 24 g, Oral, PRN    lactulose, 20 g, Oral, Q6H PRN    melatonin, 6 mg, Oral, Nightly PRN    naloxone, 0.02 mg, Intravenous, PRN    oxyCODONE, 10 mg, Oral, Q6H PRN    prochlorperazine, 2.5 mg, Intravenous, Q6H PRN    promethazine, 25 mg, Oral, Q6H PRN    senna-docusate 8.6-50 mg, 1 tablet, Oral, Daily PRN    sodium chloride 0.9%, 10 mL, Intravenous, Q12H PRN    tiZANidine, 4 mg, Oral, Q8H PRN     Review of patient's allergies indicates:  No Known Allergies  Objective:     Vital Signs (Most Recent):  Temp: 99 °F (37.2 °C) (08/24/24 0808)  Pulse: 85 (08/24/24 0808)  Resp: 18 (08/24/24 0808)  BP: 132/71 (08/24/24 0808)  SpO2: 97 % (08/24/24 0808) Vital Signs (24h Range):  Temp:  [98.6 °F (37 °C)-99 °F (37.2 °C)] 99 °F (37.2 °C)  Pulse:  [66-85] 85  Resp:  [16-19] 18  SpO2:  [97 %-99 %] 97 %  BP: (111-137)/(71-81) 132/71     Weight: 109.5 kg (241 lb 4.8 oz)  Body mass index is 44.13 kg/m².    Intake/Output - Last 3 Shifts         08/22 0700 08/23 0659 08/23 0700 08/24 0659 08/24 0700 08/25 0659    P.O.  480     IV Piggyback 47.5      Total Intake(mL/kg) 47.5 (0.4) 480 (4.4)     Urine (mL/kg/hr)  850 (0.3)     Total Output  850     Net +47.5 -370                     Physical Exam  Vitals and nursing note reviewed.   Constitutional:       General: She is not in acute distress.     Appearance: She is not ill-appearing.   HENT:      Head: Normocephalic and atraumatic.   Cardiovascular:      Rate and Rhythm: Normal rate.   Pulmonary:      Effort: Pulmonary effort is normal. No respiratory distress.   Skin:     General: Skin is warm and dry.      Comments: 4-5 cm subcutaneous mass palpable at prior port site. Firm to palpation, moderately tender. No overlying skin changes or drainage. Incision healing well   Neurological:      General: No focal deficit present.      Mental Status: She is alert.          Significant Labs:  I have reviewed all pertinent lab results within the past 24 hours.  CBC:    Recent Labs   Lab 08/24/24  0427   WBC 7.24   RBC 3.98*   HGB 10.2*   HCT 31.4*      MCV 79*   MCH 25.6*   MCHC 32.5     CMP:   Recent Labs   Lab 08/24/24 0427   GLU 78   CALCIUM 9.8   ALBUMIN 3.4*   PROT 7.0      K 4.3   CO2 22*   *   BUN 15   CREATININE 1.2   ALKPHOS 71   ALT 25   AST 42*   BILITOT 0.6       Significant Diagnostics:  I have reviewed all pertinent imaging results/findings within the past 24 hours.  Assessment/Plan:     Fluid collection at surgical site  Nazanin Malone is a 44 y.o. F with history of multiple sickle cell pain crises who was readmitted yesterday with recurrent pain as well as continued pain at site of prior left sided port. US showed a 5.4cm complex fluid collection, non compressible. AF, WBC WNL    - no overlying skin changes or drainage from site to suggest underlying abscess. This likely represents persistent hematoma that was present during prior hospitalization  - no indication for I&D at this point. Recommend conservative symptomatic management at this time  - can attempt warm compresses or pressure dressings.   - rest of care per primary team          Daiana Adame MD  General Surgery  [facility] - Med Surg ([facility]-16)

## 2024-08-24 NOTE — ASSESSMENT & PLAN NOTE
Nazanin Malone is a 44 y.o. F with history of multiple sickle cell pain crises who was readmitted yesterday with recurrent pain as well as continued pain at site of prior left sided port. US showed a 5.4cm complex fluid collection, non compressible. AF, WBC WNL    - no overlying skin changes or drainage from site to suggest underlying abscess. This likely represents persistent hematoma that was present during prior hospitalization  - no indication for I&D at this point. Recommend conservative symptomatic management at this time  - can attempt warm compresses or pressure dressings.   - rest of care per primary team

## 2024-08-24 NOTE — ASSESSMENT & PLAN NOTE
Pt had 2 episodes before admission and No recent sick contacts, Pt had recent hospitalization and abx use but on chronic opoid use. Possible infectious etiology like C diff considered, however has not had a bowel movement since 8/22.      Plan  - Continue to monitor

## 2024-08-24 NOTE — PLAN OF CARE
Problem: Adult Inpatient Plan of Care  Goal: Plan of Care Review  Outcome: Progressing  Goal: Absence of Hospital-Acquired Illness or Injury  Outcome: Progressing  Goal: Optimal Comfort and Wellbeing  Outcome: Progressing  Goal: Readiness for Transition of Care  Outcome: Progressing     Problem: Acute Kidney Injury/Impairment  Goal: Improved Oral Intake  Outcome: Progressing  Goal: Effective Renal Function  Outcome: Progressing     Problem: Infection  Goal: Absence of Infection Signs and Symptoms  Outcome: Progressing

## 2024-08-24 NOTE — ASSESSMENT & PLAN NOTE
Patient had recently gotten port on left chest removed and admits to pain on site.   Repeat US report shows: Interval increase in size of a complex subcutaneous collection in the left upper chest, measuring up to 5.4 cm.     Plan:  Consulted general surgery, feel more likely hematoma. No I&D scheduled.  Start Doxycycline 100 BID for 5 days

## 2024-08-24 NOTE — ASSESSMENT & PLAN NOTE
-Baseline Hgb is around 9  -Macrocytic MCV; most likely 2/2 to Beta Thalassemia   Recent Labs     08/22/24  1004 08/23/24  0442 08/24/24  0427   HGB 10.5* 9.6* 10.2*   HCT 31.5* 29.8* 31.4*       Plan  - Monitor serial CBC: Daily  - Transfuse PRBC if patient becomes hemodynamically unstable, symptomatic or H/H drops below 7/21.

## 2024-08-24 NOTE — PROGRESS NOTES
[facility] ([address] Medicine  Progress Note    Patient Name: Nazanin Malone  MRN: [id number]  Patient Class: IP- Inpatient   Admission Date: 8/22/2024  Length of Stay: 1 days  Attending Physician: Chase Gasca III*  Primary Care Provider: Pee Montgomery MD        Subjective:     Principal Problem:Sickle cell pain crisis        HPI:  35yo F with hx of sickle cell beta thalassemia disease, HTN, asthma, depression, PE comes in to ED with chest pain and abdominal pain. Patient also mentions associated diarrhea, bilious vomiting, productive cough with clear sputum, decreased appetite, and slight dyspnea at rest. Patient was recently discharged for a sickle cell crisis, PE, UTI and feels like this sickle cell again. She is adherent to the Lovenox and had almost finished course of Keflex except for the last dose which she threw up.     Patient states the abdominal pain is diffuse but worse on the RUQ and with palpation. Patient has some diffuse chest pain that is worsened with palpation around especially around the port sites. Patient states all this symptoms she is experiencing started yesterday. Patient currently takes MS contin BID and Percocet Q6H for pain and promethazine for nausea. She was give lasix from her last admission due to to upper extremity edema and currently does not take hydroxyurea but expresses wishes to take it. Patient denies any dysuria and hematuria or any recent stressors that could have caused this latest flare of sickle cell.     In the ED, CBC, CMP, CXR and RUQ US was ordered and Dilaudid and Morphine were given for pain control.      Overview/Hospital Course:  Patient was admitted due to vomiting, diarrhea and chest  and abdominal pain. Pain is being controlled with her home medications along with dilaudid PCA pump.  PCA pump cancelled on 8/23. Pain regimen transitioned to IV dilaudid with breakthrough oxy PRN resulting in improvement in pain control.  Pain will be reassessed again daily.      Gen surg consulted for growing and painful area on left chest where port used to be, believe it is more likely a hematoma, will not perform I&D at this time.     Interval History: Patient reports numbness/paresthesias in her right lower extremity this morning. Says this comes and goes, has had a prior RLE fasciotomy. Also endorses some RUQ abdominal tenderness this morning. Says her pain is 8/10 but is improved from yesterday now that she switched from the PCA to scheduled IV pain medications.     Review of Systems   Constitutional:  Negative for chills, diaphoresis and fever.   Respiratory:  Negative for cough, shortness of breath and wheezing.    Cardiovascular:  Negative for chest pain and palpitations.   Gastrointestinal:  Positive for abdominal pain and nausea. Negative for constipation, diarrhea and vomiting.   Genitourinary: Negative.    Musculoskeletal:         RLE paresthesias.    Skin: Negative.    Neurological:  Positive for numbness. Negative for dizziness, weakness and headaches.   Psychiatric/Behavioral: Negative.       Objective:     Vital Signs (Most Recent):  Temp: 99 °F (37.2 °C) (08/24/24 0808)  Pulse: 85 (08/24/24 0808)  Resp: 18 (08/24/24 0956)  BP: 132/71 (08/24/24 0808)  SpO2: 97 % (08/24/24 0808) Vital Signs (24h Range):  Temp:  [98.6 °F (37 °C)-99 °F (37.2 °C)] 99 °F (37.2 °C)  Pulse:  [66-85] 85  Resp:  [16-19] 18  SpO2:  [97 %-99 %] 97 %  BP: (111-137)/(71-81) 132/71     Weight: 109.5 kg (241 lb 4.8 oz)  Body mass index is 44.13 kg/m².    Intake/Output Summary (Last 24 hours) at 8/24/2024 1018  Last data filed at 8/23/2024 1330  Gross per 24 hour   Intake 240 ml   Output 650 ml   Net -410 ml         Physical Exam  Constitutional:       General: She is not in acute distress.  Cardiovascular:      Rate and Rhythm: Normal rate and regular rhythm.      Pulses: Normal pulses.      Heart sounds: Normal heart sounds. No murmur heard.  Pulmonary:       Effort: Pulmonary effort is normal. No respiratory distress.      Breath sounds: Normal breath sounds. No wheezing or rales.   Abdominal:      General: Abdomen is flat. Bowel sounds are normal.      Palpations: Abdomen is soft.      Tenderness: There is abdominal tenderness (RUQ). There is no guarding.   Musculoskeletal:      Comments: Jerri's sign positive.    Skin:     Comments: RLE mildly warmer than LLE.    Neurological:      Mental Status: She is alert and oriented to person, place, and time. Mental status is at baseline.      Motor: No weakness.             Significant Labs: All pertinent labs within the past 24 hours have been reviewed.    Significant Imaging: I have reviewed all pertinent imaging results/findings within the past 24 hours.    Assessment/Plan:      * Sickle cell pain crisis  44 year old female with history of SC anemia with history of acute chest, beta thalassemia, HTN, obesity, iron deficiency anemia, hx of PE on apixaban, RLE fasciotomy, and recent hospitalization for bacteremia s/p antibiotic course and recent sickle cell pain crisis.     Plan  -Continue titrating pain meds  -LR IV fluids  -Pain control:   -continue IV push Dilaudid 3mg Q4 and 20 oxycodone between as breakthrough   -Acetaminophen 1g PRN   -MS contin (morphine) 15mg BID   -Start hydroxyuea 1000mg and titrate PRN                -consider PO benadryl 25mg PRN for pruritus              -Bowel regimen: senna PRN, miralax PRN  -Avoid NSAIDs due to allergies          Fluid collection at surgical site  Patient had recently gotten port on left chest removed and admits to pain on site.   Repeat US report shows: Interval increase in size of a complex subcutaneous collection in the left upper chest, measuring up to 5.4 cm.     Plan:  Consulted general surgery, feel more likely hematoma. No I&D scheduled.  Start Doxycycline 100 BID for 5 days          Nausea and vomiting  Likely secondary to SC crisis pain  Pt previously responded well  to compazine and promethazine but poorly to zofran    Plan:  -Promethazine 25mg Q6H PRN, and compazine 2.5mg Q6H PRN      Diarrhea  Pt had 2 episodes before admission and No recent sick contacts, Pt had recent hospitalization and abx use but on chronic opoid use. Possible infectious etiology like C diff considered, however has not had a bowel movement since 8/22.      Plan  - Continue to monitor     History of pulmonary embolism  -Therapeutic dose of Enoxaparin due to hx of clots and PE. Today complained of right leg paresthesias/pain, right leg mildly warmer on exam, brigid sign positive.     Plan:   - US RLE to rule out DVT    Anemia  -Baseline Hgb is around 9  -Macrocytic MCV; most likely 2/2 to Beta Thalassemia   Recent Labs     08/22/24  1004 08/23/24  0442 08/24/24  0427   HGB 10.5* 9.6* 10.2*   HCT 31.5* 29.8* 31.4*       Plan  - Monitor serial CBC: Daily  - Transfuse PRBC if patient becomes hemodynamically unstable, symptomatic or H/H drops below 7/21.    Anxiety  -continue flouxetine      Hypertension  -resume home meds below    Hypertension Medications               amLODIPine (NORVASC) 10 MG tablet Take 1 tablet (10 mg total) by mouth once daily.    carvediloL (COREG) 25 MG tablet Take 1 tablet (25 mg total) by mouth 2 (two) times daily.                   VTE Risk Mitigation (From admission, onward)           Ordered     enoxaparin injection 105 mg  2 times daily         08/22/24 1501     IP VTE HIGH RISK PATIENT  Once         08/22/24 1326     Place sequential compression device  Until discontinued         08/22/24 1326                    Discharge Planning   ALYSE: 8/30/2024     Code Status: Full Code   Is the patient medically ready for discharge?:     Reason for patient still in hospital (select all that apply): Patient trending condition  Discharge Plan A: Home                  Jarrett Vincent MD  Department of Hospital Medicine   [facility] ([place]-)     SHANICE

## 2024-08-24 NOTE — SUBJECTIVE & OBJECTIVE
Interval History: NAEO. AF, HDS. Hbg stable. Pain stable.     Medications:  Continuous Infusions:   lactated ringers   Intravenous Continuous 50 mL/hr at 08/22/24 1637 New Bag at 08/22/24 1637     Scheduled Meds:   amLODIPine  10 mg Oral Daily    carvediloL  25 mg Oral BID    doxycycline  100 mg Oral Q12H    enoxaparin  105 mg Subcutaneous BID    FLUoxetine  40 mg Oral Daily    folic acid  1 mg Oral Daily    gabapentin  400 mg Oral BID    HYDROmorphone  3 mg Intravenous Q4H    hydroxyurea  1,000 mg Oral Daily    morphine  15 mg Oral BID     PRN Meds:  Current Facility-Administered Medications:     acetaminophen, 1,000 mg, Oral, Daily PRN    dextrose 10%, 12.5 g, Intravenous, PRN    dextrose 10%, 25 g, Intravenous, PRN    diphenhydrAMINE, 25 mg, Intravenous, Q4H PRN    glucagon (human recombinant), 1 mg, Intramuscular, PRN    glucose, 16 g, Oral, PRN    glucose, 24 g, Oral, PRN    lactulose, 20 g, Oral, Q6H PRN    melatonin, 6 mg, Oral, Nightly PRN    naloxone, 0.02 mg, Intravenous, PRN    oxyCODONE, 10 mg, Oral, Q6H PRN    prochlorperazine, 2.5 mg, Intravenous, Q6H PRN    promethazine, 25 mg, Oral, Q6H PRN    senna-docusate 8.6-50 mg, 1 tablet, Oral, Daily PRN    sodium chloride 0.9%, 10 mL, Intravenous, Q12H PRN    tiZANidine, 4 mg, Oral, Q8H PRN     Review of patient's allergies indicates:  No Known Allergies  Objective:     Vital Signs (Most Recent):  Temp: 99 °F (37.2 °C) (08/24/24 0808)  Pulse: 85 (08/24/24 0808)  Resp: 18 (08/24/24 0808)  BP: 132/71 (08/24/24 0808)  SpO2: 97 % (08/24/24 0808) Vital Signs (24h Range):  Temp:  [98.6 °F (37 °C)-99 °F (37.2 °C)] 99 °F (37.2 °C)  Pulse:  [66-85] 85  Resp:  [16-19] 18  SpO2:  [97 %-99 %] 97 %  BP: (111-137)/(71-81) 132/71     Weight: 109.5 kg (241 lb 4.8 oz)  Body mass index is 44.13 kg/m².    Intake/Output - Last 3 Shifts         08/22 0700 08/23 0659 08/23 0700 08/24 0659 08/24 0700 08/25 0659    P.O.  480     IV Piggyback 47.5      Total Intake(mL/kg) 47.5  (0.4) 480 (4.4)     Urine (mL/kg/hr)  850 (0.3)     Total Output  850     Net +47.5 -370                     Physical Exam  Vitals and nursing note reviewed.   Constitutional:       General: She is not in acute distress.     Appearance: She is not ill-appearing.   HENT:      Head: Normocephalic and atraumatic.   Cardiovascular:      Rate and Rhythm: Normal rate.   Pulmonary:      Effort: Pulmonary effort is normal. No respiratory distress.   Skin:     General: Skin is warm and dry.      Comments: 4-5 cm subcutaneous mass palpable at prior port site. Firm to palpation, moderately tender. No overlying skin changes or drainage. Incision healing well   Neurological:      General: No focal deficit present.      Mental Status: She is alert.          Significant Labs:  I have reviewed all pertinent lab results within the past 24 hours.  CBC:   Recent Labs   Lab 08/24/24 0427   WBC 7.24   RBC 3.98*   HGB 10.2*   HCT 31.4*      MCV 79*   MCH 25.6*   MCHC 32.5     CMP:   Recent Labs   Lab 08/24/24 0427   GLU 78   CALCIUM 9.8   ALBUMIN 3.4*   PROT 7.0      K 4.3   CO2 22*   *   BUN 15   CREATININE 1.2   ALKPHOS 71   ALT 25   AST 42*   BILITOT 0.6       Significant Diagnostics:  I have reviewed all pertinent imaging results/findings within the past 24 hours.

## 2024-08-24 NOTE — MEDICAL/APP STUDENT
[facility] ([address] Medicine  Progress Note    Patient Name: Nazanin Malone  MRN: [id number]  Patient Class: IP- Inpatient   Admission Date: 8/22/2024  Length of Stay: 1 days  Attending Physician: Chase Gasca III*  Primary Care Provider: Pee Montgomery MD        Subjective:     Principal Problem:Sickle cell pain crisis        HPI:  44F with PMHx of HTN, beta thalassemia, sickle cell anemia (hx of acute chest), hx of PE (on Lovenox), Rhabdo, RLE fasciotomy p/w abdominal and chest pain x1 day. Pain is diffuse but most concentrated in the RUQ. Pt endorses associated n/v stating that she can not tolerate any PO and vomited her home pain meds today. She also reports 2x episodes of diarrhea today. Pt has a hx of frequent SC pain crises and was recently hospitalized for one last week. Pt states the current pain feels similar to past SC crises pain. Pt takes morphine 15mg BIG and percocet 10mg Q6H at home. Pt reports not hydrating well recently. She reports subjective fever() for the last month.     Pt was recently discharged with 7 days of lasix which she says she took for 4 days and stopped once her swelling resolved. Pt took full course of Keflex she was d/c'd with too except the last dose which she vomited. On her last admission she states that compazine and promethazine rotation helped her sxs, but zofran was ineffective. She denies any urinary symptoms and has normal UOP. She reports compliance with all her medications at home including Lovenox which she last took this morning. She can walk but not for long distances at [place]. She reports a hx of severe allergies (throat closing) with NSAID use.       Overview/Hospital Course:  Patient was admitted due to vomiting, diarrhea and chest  and abdominal pain. Pain is being controlled with her home medications along with dilaudid PCA pump.  PCA pump cancelled on 8/23. Pain regimen transitioned to IV dilaudid with  breakthrough oxy PRN resulting in improvement in pain control. Pain will be reassessed again daily.      Gen surg consulted for growing and painful area on left chest where port used to be, believe it is more likely a hematoma, will not perform I&D at this time.     Interval History: NAEON. Today pt reports continued pain that is 9/10 at peak but tolerable with pain meds. She reports improvement in pain control with the new pain regimen. She reports RLE pain that has worsened compared to baseline. Her appetite is improving and has not vomited again. She has not had a bowel movement in 2 days. She denies any CP or SOB. Denies  sxs.    Objective:   [unfilled]Weight: 109.5 kg (241 lb 4.8 oz)  Body mass index is 44.13 kg/m².  No intake or output data in the 24 hours ending 08/24/24 1411    Physical Exam  Constitutional:       Appearance: Normal appearance.   HENT:      Head: Normocephalic and atraumatic.      Nose: Nose normal.      Mouth: Mucous membranes are moist.   Eyes:      Extraocular Movements: Extraocular movements intact.      Conjunctiva/sclera: Conjunctivae normal.   Cardiovascular:      Rate and Rhythm: Normal rate and regular rhythm.   Pulmonary:      Effort: Pulmonary effort is normal.      Breath sounds: Normal breath sounds.   Abdominal:      General: Abdomen is flat. Bowel sounds are normal.      Palpations: Abdomen is soft.   Musculoskeletal:         General: No swelling or erythema. No tenderness to palpation  Skin:     General: Skin is warm.   Neurological:      Mental Status: She is alert. Mental status is at baseline.   Psychiatric:         Mood and Affect: Mood normal.         Behavior: Behavior normal.     Significant Labs: All pertinent labs within the past 24 hours have been reviewed.    Significant Imaging: I have reviewed all pertinent imaging results/findings within the past 24 hours.      Assessment/Plan:     44F with PMHx of HTN, beta thalassemia, sickle cell anemia (hx of acute  chest), hx of PE (on Lovenox), Rhabdo, RLE fasciotomy p/w abdominal and chest pain x1 day. Pt endorses associated n/v stating that she can not tolerate any PO. She also reports 2x episodes of diarrhea. Pt has a hx of frequent SC pain crises and was recently hospitalized for one last week.      SCD Vaso-occlusive Crisis  -CXR and Abdominal US negative  -EKG and Troponin negative  -Lactate normal  -No leukocytosis  -Hb 10.5 with baseline of ~9  -Pt chest pain likely 2/2 SC  crisis     Plan:  -maintenance IVF (LR 50ml/hr)  -D/c PCA as pt not tolerating well  -Continue regimen outlined below  -Pain control:   -Hydromorphone 3mg Q4H              -MS contin 15mg Q12H   -Oxycodone 10mg Q6H PRN              -PO benadryl 25mg PRN for pruritus  -Bowel regimen: senna PRN, miralax PRN  -Avoid NSAIDs due to allergies  -Continue hydroxyurea 1g  -VTE prophylaxis  -Monitor vitals and symptoms  -CBC Q24H     Nausea/Vomiting:  -Likely secondary to SC crisis pain  -Pt previously responded well to compazine and promethazine but poorly to zofran  -Start promethazine 25mg Q6H PRN, and compazine 2.5mg Q6H PRN  -Monitor sxs     Diarrhea:  -Pt had 2 episodes of diarrhea on 8/22  -No repeat episodes of diarrhea since  -Pt had recent hospitalization and abx use  -Consider possible infectious etiology, C diff  -F/u stool studies  -Monitor sxs     Anemia:  -Hb basline of ~9  -Pt has hx of beta thalassemia  -Anemia is macrocytic  -Transfure if Hb<7 or symptomatic/hemodynamically unstable  -CBC Q24H     Hx of Pulmonary Embolism:  -On home Lovenox  -Start Lovenox 1mg/kg  -DVT  -Monitor for sxs    RLE pain:  -Chronic, poor pain control recently due to d/c'd gabapentin  -Start gabapentin 400mg BID  -Pt has new onset worsening pain  -USS RLE for DVT r/o  -Monitor sxs    Hypertension:  -Chronic, well controlled  -Continue home carvedilol 25mg BID, amlodipine 10mg QD  -Monitor BP, titrate PRN     Anxiety:  -Chronic, well controlled  -Continue home  fluoxetine 40mg QD  -Monitor sxs       VTE Risk Mitigation (From admission, onward)           Ordered     enoxaparin injection 105 mg  2 times daily         08/22/24 1501     IP VTE HIGH RISK PATIENT  Once         08/22/24 1326     Place sequential compression device  Until discontinued         08/22/24 1326                    Discharge Planning   ALYSE: 8/30/2024     Code Status: Full Code   Is the patient medically ready for discharge?:     Reason for patient still in hospital (select all that apply): Patient unstable  Discharge Plan A: Home                  Jeni Hanley  Department of Hospital Medicine   [facility] ([place])

## 2024-08-24 NOTE — ASSESSMENT & PLAN NOTE
-Therapeutic dose of Enoxaparin due to hx of clots and PE. Today complained of right leg paresthesias/pain, right leg mildly warmer on exam, brigid sign positive.     Plan:   - US RLE to rule out DVT

## 2024-08-24 NOTE — ASSESSMENT & PLAN NOTE
Likely secondary to SC crisis pain  Pt previously responded well to compazine and promethazine but poorly to zofran    Plan:  -Promethazine 25mg Q6H PRN, and compazine 2.5mg Q6H PRN

## 2024-08-24 NOTE — ASSESSMENT & PLAN NOTE
44 year old female with history of SC anemia with history of acute chest, beta thalassemia, HTN, obesity, iron deficiency anemia, hx of PE on apixaban, RLE fasciotomy, and recent hospitalization for bacteremia s/p antibiotic course and recent sickle cell pain crisis.     Plan  -Continue titrating pain meds  -LR IV fluids  -Pain control:   -continue IV push Dilaudid 3mg Q4 and 20 oxycodone between as breakthrough   -Acetaminophen 1g PRN   -MS contin (morphine) 15mg BID   -Start hydroxyuea 1000mg and titrate PRN                -consider PO benadryl 25mg PRN for pruritus              -Bowel regimen: senna PRN, miralax PRN  -Avoid NSAIDs due to allergies

## 2024-08-24 NOTE — SUBJECTIVE & OBJECTIVE
Interval History: Patient reports numbness/paresthesias in her right lower extremity this morning. Says this comes and goes, has had a prior RLE fasciotomy. Also endorses some RUQ abdominal tenderness this morning. Says her pain is 8/10 but is improved from yesterday now that she switched from the PCA to scheduled IV pain medications.     Review of Systems   Constitutional:  Negative for chills, diaphoresis and fever.   Respiratory:  Negative for cough, shortness of breath and wheezing.    Cardiovascular:  Negative for chest pain and palpitations.   Gastrointestinal:  Positive for abdominal pain and nausea. Negative for constipation, diarrhea and vomiting.   Genitourinary: Negative.    Musculoskeletal:         RLE paresthesias.    Skin: Negative.    Neurological:  Positive for numbness. Negative for dizziness, weakness and headaches.   Psychiatric/Behavioral: Negative.       Objective:     Vital Signs (Most Recent):  Temp: 99 °F (37.2 °C) (08/24/24 0808)  Pulse: 85 (08/24/24 0808)  Resp: 18 (08/24/24 0956)  BP: 132/71 (08/24/24 0808)  SpO2: 97 % (08/24/24 0808) Vital Signs (24h Range):  Temp:  [98.6 °F (37 °C)-99 °F (37.2 °C)] 99 °F (37.2 °C)  Pulse:  [66-85] 85  Resp:  [16-19] 18  SpO2:  [97 %-99 %] 97 %  BP: (111-137)/(71-81) 132/71     Weight: 109.5 kg (241 lb 4.8 oz)  Body mass index is 44.13 kg/m².    Intake/Output Summary (Last 24 hours) at 8/24/2024 1018  Last data filed at 8/23/2024 1330  Gross per 24 hour   Intake 240 ml   Output 650 ml   Net -410 ml         Physical Exam  Constitutional:       General: She is not in acute distress.  Cardiovascular:      Rate and Rhythm: Normal rate and regular rhythm.      Pulses: Normal pulses.      Heart sounds: Normal heart sounds. No murmur heard.  Pulmonary:      Effort: Pulmonary effort is normal. No respiratory distress.      Breath sounds: Normal breath sounds. No wheezing or rales.   Abdominal:      General: Abdomen is flat. Bowel sounds are normal.       Palpations: Abdomen is soft.      Tenderness: There is abdominal tenderness (RUQ). There is no guarding.   Musculoskeletal:      Comments: Jerri's sign positive.    Skin:     Comments: RLE mildly warmer than LLE.    Neurological:      Mental Status: She is alert and oriented to person, place, and time. Mental status is at baseline.      Motor: No weakness.             Significant Labs: All pertinent labs within the past 24 hours have been reviewed.    Significant Imaging: I have reviewed all pertinent imaging results/findings within the past 24 hours.

## 2024-08-25 LAB
ALBUMIN SERPL BCP-MCNC: 3.3 G/DL (ref 3.5–5.2)
ALP SERPL-CCNC: 65 U/L (ref 55–135)
ALT SERPL W/O P-5'-P-CCNC: 23 U/L (ref 10–44)
ANION GAP SERPL CALC-SCNC: 7 MMOL/L (ref 8–16)
AST SERPL-CCNC: 27 U/L (ref 10–40)
BASOPHILS # BLD AUTO: 0.06 K/UL (ref 0–0.2)
BASOPHILS NFR BLD: 0.8 % (ref 0–1.9)
BILIRUB SERPL-MCNC: 0.4 MG/DL (ref 0.1–1)
BUN SERPL-MCNC: 19 MG/DL (ref 6–20)
CALCIUM SERPL-MCNC: 9.6 MG/DL (ref 8.7–10.5)
CHLORIDE SERPL-SCNC: 107 MMOL/L (ref 95–110)
CO2 SERPL-SCNC: 25 MMOL/L (ref 23–29)
CREAT SERPL-MCNC: 1.2 MG/DL (ref 0.5–1.4)
DIFFERENTIAL METHOD BLD: ABNORMAL
EOSINOPHIL # BLD AUTO: 0.4 K/UL (ref 0–0.5)
EOSINOPHIL NFR BLD: 4.9 % (ref 0–8)
ERYTHROCYTE [DISTWIDTH] IN BLOOD BY AUTOMATED COUNT: 21.5 % (ref 11.5–14.5)
EST. GFR  (NO RACE VARIABLE): 56.5 ML/MIN/1.73 M^2
FACT X PPP CHRO-ACNC: 1.38 IU/ML (ref 0.3–0.7)
GLUCOSE SERPL-MCNC: 78 MG/DL (ref 70–110)
HCT VFR BLD AUTO: 29.5 % (ref 37–48.5)
HGB BLD-MCNC: 9.4 G/DL (ref 12–16)
IMM GRANULOCYTES # BLD AUTO: 0.01 K/UL (ref 0–0.04)
IMM GRANULOCYTES NFR BLD AUTO: 0.1 % (ref 0–0.5)
LYMPHOCYTES # BLD AUTO: 4.1 K/UL (ref 1–4.8)
LYMPHOCYTES NFR BLD: 52.6 % (ref 18–48)
MAGNESIUM SERPL-MCNC: 1.9 MG/DL (ref 1.6–2.6)
MCH RBC QN AUTO: 24.5 PG (ref 27–31)
MCHC RBC AUTO-ENTMCNC: 31.9 G/DL (ref 32–36)
MCV RBC AUTO: 77 FL (ref 82–98)
MONOCYTES # BLD AUTO: 0.8 K/UL (ref 0.3–1)
MONOCYTES NFR BLD: 10.5 % (ref 4–15)
NEUTROPHILS # BLD AUTO: 2.4 K/UL (ref 1.8–7.7)
NEUTROPHILS NFR BLD: 31.1 % (ref 38–73)
NRBC BLD-RTO: 1 /100 WBC
PHOSPHATE SERPL-MCNC: 4.1 MG/DL (ref 2.7–4.5)
PLATELET # BLD AUTO: 389 K/UL (ref 150–450)
PMV BLD AUTO: 11.2 FL (ref 9.2–12.9)
POTASSIUM SERPL-SCNC: 4.4 MMOL/L (ref 3.5–5.1)
PROT SERPL-MCNC: 6.8 G/DL (ref 6–8.4)
RBC # BLD AUTO: 3.83 M/UL (ref 4–5.4)
SODIUM SERPL-SCNC: 139 MMOL/L (ref 136–145)
WBC # BLD AUTO: 7.74 K/UL (ref 3.9–12.7)

## 2024-08-25 PROCEDURE — 83735 ASSAY OF MAGNESIUM: CPT

## 2024-08-25 PROCEDURE — 84100 ASSAY OF PHOSPHORUS: CPT

## 2024-08-25 PROCEDURE — 25000003 PHARM REV CODE 250: Performed by: FAMILY MEDICINE

## 2024-08-25 PROCEDURE — 85520 HEPARIN ASSAY: CPT | Performed by: FAMILY MEDICINE

## 2024-08-25 PROCEDURE — 80053 COMPREHEN METABOLIC PANEL: CPT

## 2024-08-25 PROCEDURE — 25000003 PHARM REV CODE 250

## 2024-08-25 PROCEDURE — 63600175 PHARM REV CODE 636 W HCPCS

## 2024-08-25 PROCEDURE — 36415 COLL VENOUS BLD VENIPUNCTURE: CPT

## 2024-08-25 PROCEDURE — 85025 COMPLETE CBC W/AUTO DIFF WBC: CPT

## 2024-08-25 PROCEDURE — 93005 ELECTROCARDIOGRAM TRACING: CPT

## 2024-08-25 PROCEDURE — 11000001 HC ACUTE MED/SURG PRIVATE ROOM

## 2024-08-25 PROCEDURE — 27000207 HC ISOLATION

## 2024-08-25 PROCEDURE — 93010 ELECTROCARDIOGRAM REPORT: CPT | Mod: ,,, | Performed by: INTERNAL MEDICINE

## 2024-08-25 PROCEDURE — 63600175 PHARM REV CODE 636 W HCPCS: Performed by: FAMILY MEDICINE

## 2024-08-25 PROCEDURE — 36415 COLL VENOUS BLD VENIPUNCTURE: CPT | Performed by: FAMILY MEDICINE

## 2024-08-25 RX ORDER — LACTULOSE 10 G/15ML
20 SOLUTION ORAL DAILY
Status: DISCONTINUED | OUTPATIENT
Start: 2024-08-25 | End: 2024-08-27

## 2024-08-25 RX ORDER — AMOXICILLIN 250 MG
1 CAPSULE ORAL DAILY
Status: DISCONTINUED | OUTPATIENT
Start: 2024-08-25 | End: 2024-08-27

## 2024-08-25 RX ORDER — HYDROMORPHONE HYDROCHLORIDE 2 MG/ML
2 INJECTION, SOLUTION INTRAMUSCULAR; INTRAVENOUS; SUBCUTANEOUS ONCE
Status: COMPLETED | OUTPATIENT
Start: 2024-08-25 | End: 2024-08-25

## 2024-08-25 RX ORDER — OXYCODONE HCL 10 MG/1
10 TABLET, FILM COATED, EXTENDED RELEASE ORAL EVERY 12 HOURS
Status: DISCONTINUED | OUTPATIENT
Start: 2024-08-25 | End: 2024-08-27

## 2024-08-25 RX ORDER — HYDROMORPHONE HYDROCHLORIDE 1 MG/ML
1 INJECTION, SOLUTION INTRAMUSCULAR; INTRAVENOUS; SUBCUTANEOUS EVERY 4 HOURS PRN
Status: DISCONTINUED | OUTPATIENT
Start: 2024-08-25 | End: 2024-08-28

## 2024-08-25 RX ADMIN — DOXYCYCLINE HYCLATE 100 MG: 100 TABLET, COATED ORAL at 09:08

## 2024-08-25 RX ADMIN — HYDROMORPHONE HYDROCHLORIDE 3 MG: 2 INJECTION INTRAMUSCULAR; INTRAVENOUS; SUBCUTANEOUS at 02:08

## 2024-08-25 RX ADMIN — DIPHENHYDRAMINE HYDROCHLORIDE 25 MG: 50 INJECTION, SOLUTION INTRAMUSCULAR; INTRAVENOUS at 10:08

## 2024-08-25 RX ADMIN — DIPHENHYDRAMINE HYDROCHLORIDE 25 MG: 50 INJECTION, SOLUTION INTRAMUSCULAR; INTRAVENOUS at 06:08

## 2024-08-25 RX ADMIN — OXYCODONE HYDROCHLORIDE 10 MG: 10 TABLET, FILM COATED, EXTENDED RELEASE ORAL at 09:08

## 2024-08-25 RX ADMIN — FLUOXETINE HYDROCHLORIDE 40 MG: 20 CAPSULE ORAL at 09:08

## 2024-08-25 RX ADMIN — HYDROMORPHONE HYDROCHLORIDE 2 MG: 2 INJECTION INTRAMUSCULAR; INTRAVENOUS; SUBCUTANEOUS at 12:08

## 2024-08-25 RX ADMIN — GABAPENTIN 400 MG: 300 CAPSULE ORAL at 09:08

## 2024-08-25 RX ADMIN — TIZANIDINE 4 MG: 2 TABLET ORAL at 10:08

## 2024-08-25 RX ADMIN — ENOXAPARIN SODIUM 105 MG: 120 INJECTION SUBCUTANEOUS at 09:08

## 2024-08-25 RX ADMIN — HYDROMORPHONE HYDROCHLORIDE 3 MG: 2 INJECTION INTRAMUSCULAR; INTRAVENOUS; SUBCUTANEOUS at 10:08

## 2024-08-25 RX ADMIN — HYDROMORPHONE HYDROCHLORIDE 3 MG: 2 INJECTION INTRAMUSCULAR; INTRAVENOUS; SUBCUTANEOUS at 06:08

## 2024-08-25 RX ADMIN — HYDROMORPHONE HYDROCHLORIDE 1 MG: 1 INJECTION, SOLUTION INTRAMUSCULAR; INTRAVENOUS; SUBCUTANEOUS at 04:08

## 2024-08-25 RX ADMIN — FOLIC ACID 1 MG: 1 TABLET ORAL at 09:08

## 2024-08-25 RX ADMIN — CARVEDILOL 25 MG: 25 TABLET, FILM COATED ORAL at 09:08

## 2024-08-25 RX ADMIN — AMLODIPINE BESYLATE 10 MG: 10 TABLET ORAL at 09:08

## 2024-08-25 RX ADMIN — DIPHENHYDRAMINE HYDROCHLORIDE 25 MG: 50 INJECTION, SOLUTION INTRAMUSCULAR; INTRAVENOUS at 02:08

## 2024-08-25 RX ADMIN — HYDROMORPHONE HYDROCHLORIDE 1 MG: 1 INJECTION, SOLUTION INTRAMUSCULAR; INTRAVENOUS; SUBCUTANEOUS at 09:08

## 2024-08-25 RX ADMIN — SODIUM CHLORIDE, POTASSIUM CHLORIDE, SODIUM LACTATE AND CALCIUM CHLORIDE: 600; 310; 30; 20 INJECTION, SOLUTION INTRAVENOUS at 06:08

## 2024-08-25 RX ADMIN — SENNOSIDES AND DOCUSATE SODIUM 1 TABLET: 50; 8.6 TABLET ORAL at 04:08

## 2024-08-25 RX ADMIN — OXYCODONE HYDROCHLORIDE 10 MG: 10 TABLET, FILM COATED, EXTENDED RELEASE ORAL at 02:08

## 2024-08-25 RX ADMIN — TIZANIDINE 4 MG: 2 TABLET ORAL at 09:08

## 2024-08-25 RX ADMIN — LACTULOSE 20 G: 20 SOLUTION ORAL at 04:08

## 2024-08-25 RX ADMIN — HYDROXYUREA 1000 MG: 500 CAPSULE ORAL at 09:08

## 2024-08-25 RX ADMIN — MORPHINE SULFATE 15 MG: 15 TABLET, EXTENDED RELEASE ORAL at 09:08

## 2024-08-25 NOTE — ASSESSMENT & PLAN NOTE
-Baseline Hgb is around 9  -Macrocytic MCV; most likely 2/2 to Beta Thalassemia   Recent Labs     08/23/24  0442 08/24/24  0427 08/25/24  0516   HGB 9.6* 10.2* 9.4*   HCT 29.8* 31.4* 29.5*     Plan  - Monitor serial CBC: Daily  - Transfuse PRBC if patient becomes hemodynamically unstable, symptomatic or H/H drops below 7/21.

## 2024-08-25 NOTE — ASSESSMENT & PLAN NOTE
44 year old female with history of SC anemia with history of acute chest, beta thalassemia, HTN, obesity, iron deficiency anemia, hx of PE on apixaban, RLE fasciotomy, and recent hospitalization for bacteremia s/p antibiotic course and recent sickle cell pain crisis.     Plan  -Continue titrating pain meds  -LR IV fluids  -Pain control:   -continue IV push Dilaudid 3mg Q4 and switched to oxycodone 12 hour tablet 10 mg   -Acetaminophen 1g PRN   -Continue hydroxyurea 1000mg and titrate PRN                -consider PO benadryl 25mg PRN for pruritus              -Bowel regimen: senna and lactulose once daily  -Avoid NSAIDs due to allergies

## 2024-08-25 NOTE — ASSESSMENT & PLAN NOTE
-Therapeutic dose of Enoxaparin due to hx of clots and PE. Today complained of right leg paresthesias/pain, right leg mildly warmer on exam, brigid sign positive.     Plan:   - US LE showed no DVT

## 2024-08-25 NOTE — ASSESSMENT & PLAN NOTE
Patient had recently gotten port on left chest removed and admits to pain on site.   Repeat US report shows: Interval increase in size of a complex subcutaneous collection in the left upper chest, measuring up to 5.4 cm.     Plan:  Consulted general surgery, feel more likely hematoma. No I&D scheduled.  - Doxycycline 100 BID for 5 days

## 2024-08-25 NOTE — PLAN OF CARE
Problem: Adult Inpatient Plan of Care  Goal: Plan of Care Review  Outcome: Progressing  Goal: Patient-Specific Goal (Individualized)  Outcome: Progressing  Goal: Absence of Hospital-Acquired Illness or Injury  Outcome: Progressing  Goal: Optimal Comfort and Wellbeing  Outcome: Progressing  Goal: Readiness for Transition of Care  Outcome: Progressing     Problem: Acute Kidney Injury/Impairment  Goal: Improved Oral Intake  Outcome: Progressing  Goal: Effective Renal Function  Outcome: Progressing     Problem: Pneumonia  Goal: Effective Oxygenation and Ventilation  Outcome: Progressing

## 2024-08-25 NOTE — ASSESSMENT & PLAN NOTE
Pt had 2 episodes before admission and No recent sick contacts, Pt had recent hospitalization and abx use but on chronic opoid use. Possible infectious etiology like C diff considered, however has not had a bowel movement since 8/22.      Plan  -Patient has not had BM since admission  - Continue to monitor

## 2024-08-25 NOTE — SUBJECTIVE & OBJECTIVE
Interval History: LE US showed no DVT. Patient would like to talk about having pain meds 0.5-1 mg Q2h. Patient denies any increased chest pain. Patient denies dyspnea or any BM and is interested in Lactulose. Pain does reports increased itching but is also agreeable to not having to I&D left port site if not able to.     Review of Systems  Objective:     Vital Signs (Most Recent):  Temp: 99.1 °F (37.3 °C) (08/25/24 0900)  Pulse: 73 (08/25/24 0900)  Resp: 17 (08/25/24 1001)  BP: 130/87 (08/25/24 0900)  SpO2: 99 % (08/25/24 0900) Vital Signs (24h Range):  Temp:  [98.3 °F (36.8 °C)-99.2 °F (37.3 °C)] 99.1 °F (37.3 °C)  Pulse:  [68-76] 73  Resp:  [16-18] 17  SpO2:  [95 %-100 %] 99 %  BP: ()/(63-94) 130/87     Weight: 109.5 kg (241 lb 4.8 oz)  Body mass index is 44.13 kg/m².    Intake/Output Summary (Last 24 hours) at 8/25/2024 1113  Last data filed at 8/25/2024 0913  Gross per 24 hour   Intake 240 ml   Output 1450 ml   Net -1210 ml         Physical Exam  Constitutional:       Appearance: She is not diaphoretic.   HENT:      Head: Normocephalic and atraumatic.   Cardiovascular:      Rate and Rhythm: Normal rate.      Heart sounds: Normal heart sounds.   Pulmonary:      Effort: Pulmonary effort is normal.      Breath sounds: No wheezing or rales.      Comments: Has port on the right chest.  Has undefined collection/mass on where the old left port was  Abdominal:      General: Abdomen is flat. There is no distension.      Palpations: Abdomen is soft.      Tenderness: There is abdominal tenderness. There is no right CVA tenderness or left CVA tenderness.   Musculoskeletal:         General: Deformity present. No swelling.      Comments: Right lower leg fasciotomy   Skin:     General: Skin is warm.   Neurological:      Mental Status: She is alert.             Significant Labs: All pertinent labs within the past 24 hours have been reviewed.  CBC:   Recent Labs   Lab 08/24/24  0427 08/25/24  0516   WBC 7.24 7.74   HGB  10.2* 9.4*   HCT 31.4* 29.5*    389     CMP:   Recent Labs   Lab 08/24/24  0427 08/25/24  0516    139   K 4.3 4.4   * 107   CO2 22* 25   GLU 78 78   BUN 15 19   CREATININE 1.2 1.2   CALCIUM 9.8 9.6   PROT 7.0 6.8   ALBUMIN 3.4* 3.3*   BILITOT 0.6 0.4   ALKPHOS 71 65   AST 42* 27   ALT 25 23   ANIONGAP 8 7*       Significant Imaging: I have reviewed all pertinent imaging results/findings within the past 24 hours.

## 2024-08-25 NOTE — PROGRESS NOTES
[facility] ([address] Medicine  Progress Note    Patient Name: Nazanin Malone  MRN: [id number]  Patient Class: IP- Inpatient   Admission Date: 8/22/2024  Length of Stay: 2 days  Attending Physician: Chase Gasca III*  Primary Care Provider: Pee Montgomery MD        Subjective:     Principal Problem:Sickle cell pain crisis        HPI:  35yo F with hx of sickle cell beta thalassemia disease, HTN, asthma, depression, PE comes in to ED with chest pain and abdominal pain. Patient also mentions associated diarrhea, bilious vomiting, productive cough with clear sputum, decreased appetite, and slight dyspnea at rest. Patient was recently discharged for a sickle cell crisis, PE, UTI and feels like this sickle cell again. She is adherent to the Lovenox and had almost finished course of Keflex except for the last dose which she threw up.     Patient states the abdominal pain is diffuse but worse on the RUQ and with palpation. Patient has some diffuse chest pain that is worsened with palpation around especially around the port sites. Patient states all this symptoms she is experiencing started yesterday. Patient currently takes MS contin BID and Percocet Q6H for pain and promethazine for nausea. She was give lasix from her last admission due to to upper extremity edema and currently does not take hydroxyurea but expresses wishes to take it. Patient denies any dysuria and hematuria or any recent stressors that could have caused this latest flare of sickle cell.     In the ED, CBC, CMP, CXR and RUQ US was ordered and Dilaudid and Morphine were given for pain control.      Overview/Hospital Course:  Patient was admitted due to vomiting, diarrhea and chest  and abdominal pain. Pain is being controlled with her home medications along with dilaudid PCA pump.  PCA pump cancelled on 8/23. Pain regimen transitioned to IV dilaudid with breakthrough oxy PRN resulting in improvement in pain control.  Pain will be reassessed again daily.      Gen surg consulted for growing and painful area on left chest where port used to be, believe it is more likely a hematoma, will not perform I&D at this time.     Interval History: LE US showed no DVT. Patient would like to talk about having pain meds 0.5-1 mg Q2h. Patient denies any increased chest pain. Patient denies dyspnea or any BM and is interested in Lactulose. Pain does reports increased itching but is also agreeable to not having to I&D left port site if not able to.     Review of Systems  Objective:     Vital Signs (Most Recent):  Temp: 99.1 °F (37.3 °C) (08/25/24 0900)  Pulse: 73 (08/25/24 0900)  Resp: 17 (08/25/24 1001)  BP: 130/87 (08/25/24 0900)  SpO2: 99 % (08/25/24 0900) Vital Signs (24h Range):  Temp:  [98.3 °F (36.8 °C)-99.2 °F (37.3 °C)] 99.1 °F (37.3 °C)  Pulse:  [68-76] 73  Resp:  [16-18] 17  SpO2:  [95 %-100 %] 99 %  BP: ()/(63-94) 130/87     Weight: 109.5 kg (241 lb 4.8 oz)  Body mass index is 44.13 kg/m².    Intake/Output Summary (Last 24 hours) at 8/25/2024 1113  Last data filed at 8/25/2024 0913  Gross per 24 hour   Intake 240 ml   Output 1450 ml   Net -1210 ml         Physical Exam  Constitutional:       Appearance: She is not diaphoretic.   HENT:      Head: Normocephalic and atraumatic.   Cardiovascular:      Rate and Rhythm: Normal rate.      Heart sounds: Normal heart sounds.   Pulmonary:      Effort: Pulmonary effort is normal.      Breath sounds: No wheezing or rales.      Comments: Has port on the right chest.  Has undefined collection/mass on where the old left port was  Abdominal:      General: Abdomen is flat. There is no distension.      Palpations: Abdomen is soft.      Tenderness: There is abdominal tenderness. There is no right CVA tenderness or left CVA tenderness.   Musculoskeletal:         General: Deformity present. No swelling.      Comments: Right lower leg fasciotomy   Skin:     General: Skin is warm.   Neurological:       Mental Status: She is alert.             Significant Labs: All pertinent labs within the past 24 hours have been reviewed.  CBC:   Recent Labs   Lab 08/24/24  0427 08/25/24  0516   WBC 7.24 7.74   HGB 10.2* 9.4*   HCT 31.4* 29.5*    389     CMP:   Recent Labs   Lab 08/24/24  0427 08/25/24  0516    139   K 4.3 4.4   * 107   CO2 22* 25   GLU 78 78   BUN 15 19   CREATININE 1.2 1.2   CALCIUM 9.8 9.6   PROT 7.0 6.8   ALBUMIN 3.4* 3.3*   BILITOT 0.6 0.4   ALKPHOS 71 65   AST 42* 27   ALT 25 23   ANIONGAP 8 7*       Significant Imaging: I have reviewed all pertinent imaging results/findings within the past 24 hours.    Assessment/Plan:      * Sickle cell pain crisis  44 year old female with history of SC anemia with history of acute chest, beta thalassemia, HTN, obesity, iron deficiency anemia, hx of PE on apixaban, RLE fasciotomy, and recent hospitalization for bacteremia s/p antibiotic course and recent sickle cell pain crisis.     Plan  -Continue titrating pain meds  -LR IV fluids  -Pain control:   -continue IV push Dilaudid 3mg Q4 and switched to oxycodone 12 hour tablet 10 mg   -Acetaminophen 1g PRN   -Continue hydroxyurea 1000mg and titrate PRN                -consider PO benadryl 25mg PRN for pruritus              -Bowel regimen: senna and lactulose once daily  -Avoid NSAIDs due to allergies          Diarrhea  Pt had 2 episodes before admission and No recent sick contacts, Pt had recent hospitalization and abx use but on chronic opoid use. Possible infectious etiology like C diff considered, however has not had a bowel movement since 8/22.      Plan  -Patient has not had BM since admission  - Continue to monitor     Fluid collection at surgical site  Patient had recently gotten port on left chest removed and admits to pain on site.   Repeat US report shows: Interval increase in size of a complex subcutaneous collection in the left upper chest, measuring up to 5.4 cm.     Plan:  Consulted general  surgery, feel more likely hematoma. No I&D scheduled.  - Doxycycline 100 BID for 5 days          Anemia  -Baseline Hgb is around 9  -Macrocytic MCV; most likely 2/2 to Beta Thalassemia   Recent Labs     08/23/24  0442 08/24/24  0427 08/25/24  0516   HGB 9.6* 10.2* 9.4*   HCT 29.8* 31.4* 29.5*     Plan  - Monitor serial CBC: Daily  - Transfuse PRBC if patient becomes hemodynamically unstable, symptomatic or H/H drops below 7/21.    Nausea and vomiting  Likely secondary to SC crisis pain  Pt previously responded well to compazine and promethazine but poorly to zofran    Plan:  -Promethazine 25mg Q6H PRN, and compazine 2.5mg Q6H PRN      History of pulmonary embolism  -Therapeutic dose of Enoxaparin due to hx of clots and PE. Today complained of right leg paresthesias/pain, right leg mildly warmer on exam, brigid sign positive.     Plan:   - US LE showed no DVT    Hypertension  -resume home meds below    Hypertension Medications               amLODIPine (NORVASC) 10 MG tablet Take 1 tablet (10 mg total) by mouth once daily.    carvediloL (COREG) 25 MG tablet Take 1 tablet (25 mg total) by mouth 2 (two) times daily.                 Anxiety  -continue flouxetine        VTE Risk Mitigation (From admission, onward)           Ordered     enoxaparin injection 105 mg  2 times daily         08/22/24 1501     IP VTE HIGH RISK PATIENT  Once         08/22/24 1326     Place sequential compression device  Until discontinued         08/22/24 1326                    Discharge Planning   ALYSE: 8/30/2024     Code Status: Full Code   Is the patient medically ready for discharge?:     Reason for patient still in hospital (select all that apply): Patient trending condition  Discharge Plan A: Home                  Lin Carter Torrez MD  Department of Hospital Medicine   [facility] ([place]-)

## 2024-08-26 LAB
ALBUMIN SERPL BCP-MCNC: 3.3 G/DL (ref 3.5–5.2)
ALP SERPL-CCNC: 65 U/L (ref 55–135)
ALT SERPL W/O P-5'-P-CCNC: 19 U/L (ref 10–44)
ANION GAP SERPL CALC-SCNC: 6 MMOL/L (ref 8–16)
AST SERPL-CCNC: 23 U/L (ref 10–40)
BASOPHILS # BLD AUTO: 0.04 K/UL (ref 0–0.2)
BASOPHILS NFR BLD: 0.6 % (ref 0–1.9)
BILIRUB SERPL-MCNC: 0.5 MG/DL (ref 0.1–1)
BUN SERPL-MCNC: 19 MG/DL (ref 6–20)
CALCIUM SERPL-MCNC: 9.5 MG/DL (ref 8.7–10.5)
CHLORIDE SERPL-SCNC: 108 MMOL/L (ref 95–110)
CO2 SERPL-SCNC: 25 MMOL/L (ref 23–29)
CREAT SERPL-MCNC: 1.2 MG/DL (ref 0.5–1.4)
DIFFERENTIAL METHOD BLD: ABNORMAL
ENTEROVIRUS/RHINOVIRUS: NOT DETECTED
EOSINOPHIL # BLD AUTO: 0.3 K/UL (ref 0–0.5)
EOSINOPHIL NFR BLD: 4.8 % (ref 0–8)
ERYTHROCYTE [DISTWIDTH] IN BLOOD BY AUTOMATED COUNT: 21.1 % (ref 11.5–14.5)
EST. GFR  (NO RACE VARIABLE): 56.5 ML/MIN/1.73 M^2
GLUCOSE SERPL-MCNC: 76 MG/DL (ref 70–110)
HCT VFR BLD AUTO: 27.1 % (ref 37–48.5)
HGB BLD-MCNC: 9 G/DL (ref 12–16)
HUMAN BOCAVIRUS: NOT DETECTED
HUMAN CORONAVIRUS, COMMON COLD VIRUS: NOT DETECTED
IMM GRANULOCYTES # BLD AUTO: 0.01 K/UL (ref 0–0.04)
IMM GRANULOCYTES NFR BLD AUTO: 0.2 % (ref 0–0.5)
INFLUENZA A - H1N1-09: NOT DETECTED
LYMPHOCYTES # BLD AUTO: 3 K/UL (ref 1–4.8)
LYMPHOCYTES NFR BLD: 48 % (ref 18–48)
MAGNESIUM SERPL-MCNC: 1.8 MG/DL (ref 1.6–2.6)
MCH RBC QN AUTO: 25.1 PG (ref 27–31)
MCHC RBC AUTO-ENTMCNC: 33.2 G/DL (ref 32–36)
MCV RBC AUTO: 76 FL (ref 82–98)
MONOCYTES # BLD AUTO: 0.7 K/UL (ref 0.3–1)
MONOCYTES NFR BLD: 10.5 % (ref 4–15)
NEUTROPHILS # BLD AUTO: 2.2 K/UL (ref 1.8–7.7)
NEUTROPHILS NFR BLD: 35.9 % (ref 38–73)
NRBC BLD-RTO: 1 /100 WBC
PARAINFLUENZA: NOT DETECTED
PHOSPHATE SERPL-MCNC: 4 MG/DL (ref 2.7–4.5)
PLATELET # BLD AUTO: 397 K/UL (ref 150–450)
PMV BLD AUTO: 10.6 FL (ref 9.2–12.9)
POTASSIUM SERPL-SCNC: 4.2 MMOL/L (ref 3.5–5.1)
PROT SERPL-MCNC: 6.6 G/DL (ref 6–8.4)
RBC # BLD AUTO: 3.59 M/UL (ref 4–5.4)
RVP - ADENOVIRUS: NOT DETECTED
RVP - HUMAN METAPNEUMOVIRUS (HMPV): NOT DETECTED
RVP - INFLUENZA A: NOT DETECTED
RVP - INFLUENZA B: NOT DETECTED
RVP - RESPIRATORY SYNCTIAL VIRUS (RSV) A: NOT DETECTED
RVP - RESPIRATORY VIRAL PANEL, SOURCE: NORMAL
SODIUM SERPL-SCNC: 139 MMOL/L (ref 136–145)
WBC # BLD AUTO: 6.19 K/UL (ref 3.9–12.7)

## 2024-08-26 PROCEDURE — 27000207 HC ISOLATION

## 2024-08-26 PROCEDURE — 94761 N-INVAS EAR/PLS OXIMETRY MLT: CPT

## 2024-08-26 PROCEDURE — 25000003 PHARM REV CODE 250

## 2024-08-26 PROCEDURE — 63600175 PHARM REV CODE 636 W HCPCS: Performed by: FAMILY MEDICINE

## 2024-08-26 PROCEDURE — 85025 COMPLETE CBC W/AUTO DIFF WBC: CPT

## 2024-08-26 PROCEDURE — 11000001 HC ACUTE MED/SURG PRIVATE ROOM

## 2024-08-26 PROCEDURE — 99900035 HC TECH TIME PER 15 MIN (STAT)

## 2024-08-26 PROCEDURE — 25000003 PHARM REV CODE 250: Performed by: FAMILY MEDICINE

## 2024-08-26 PROCEDURE — 84100 ASSAY OF PHOSPHORUS: CPT

## 2024-08-26 PROCEDURE — 83735 ASSAY OF MAGNESIUM: CPT

## 2024-08-26 PROCEDURE — 36415 COLL VENOUS BLD VENIPUNCTURE: CPT

## 2024-08-26 PROCEDURE — 63600175 PHARM REV CODE 636 W HCPCS

## 2024-08-26 PROCEDURE — 80053 COMPREHEN METABOLIC PANEL: CPT

## 2024-08-26 RX ORDER — ENOXAPARIN SODIUM 150 MG/ML
90 INJECTION SUBCUTANEOUS 2 TIMES DAILY
Status: DISCONTINUED | OUTPATIENT
Start: 2024-08-26 | End: 2024-08-29

## 2024-08-26 RX ADMIN — TIZANIDINE 4 MG: 2 TABLET ORAL at 10:08

## 2024-08-26 RX ADMIN — GABAPENTIN 400 MG: 300 CAPSULE ORAL at 08:08

## 2024-08-26 RX ADMIN — HYDROMORPHONE HYDROCHLORIDE 1 MG: 1 INJECTION, SOLUTION INTRAMUSCULAR; INTRAVENOUS; SUBCUTANEOUS at 10:08

## 2024-08-26 RX ADMIN — DIPHENHYDRAMINE HYDROCHLORIDE 25 MG: 50 INJECTION, SOLUTION INTRAMUSCULAR; INTRAVENOUS at 02:08

## 2024-08-26 RX ADMIN — DIPHENHYDRAMINE HYDROCHLORIDE 25 MG: 50 INJECTION, SOLUTION INTRAMUSCULAR; INTRAVENOUS at 01:08

## 2024-08-26 RX ADMIN — OXYCODONE HYDROCHLORIDE 10 MG: 10 TABLET, FILM COATED, EXTENDED RELEASE ORAL at 08:08

## 2024-08-26 RX ADMIN — HYDROMORPHONE HYDROCHLORIDE 3 MG: 2 INJECTION INTRAMUSCULAR; INTRAVENOUS; SUBCUTANEOUS at 06:08

## 2024-08-26 RX ADMIN — DIPHENHYDRAMINE HYDROCHLORIDE 25 MG: 50 INJECTION, SOLUTION INTRAMUSCULAR; INTRAVENOUS at 06:08

## 2024-08-26 RX ADMIN — FOLIC ACID 1 MG: 1 TABLET ORAL at 08:08

## 2024-08-26 RX ADMIN — HYDROMORPHONE HYDROCHLORIDE 3 MG: 2 INJECTION INTRAMUSCULAR; INTRAVENOUS; SUBCUTANEOUS at 05:08

## 2024-08-26 RX ADMIN — HYDROMORPHONE HYDROCHLORIDE 1 MG: 1 INJECTION, SOLUTION INTRAMUSCULAR; INTRAVENOUS; SUBCUTANEOUS at 04:08

## 2024-08-26 RX ADMIN — FLUOXETINE HYDROCHLORIDE 40 MG: 20 CAPSULE ORAL at 08:08

## 2024-08-26 RX ADMIN — ENOXAPARIN SODIUM 105 MG: 120 INJECTION SUBCUTANEOUS at 08:08

## 2024-08-26 RX ADMIN — DOXYCYCLINE HYCLATE 100 MG: 100 TABLET, COATED ORAL at 08:08

## 2024-08-26 RX ADMIN — DIPHENHYDRAMINE HYDROCHLORIDE 25 MG: 50 INJECTION, SOLUTION INTRAMUSCULAR; INTRAVENOUS at 10:08

## 2024-08-26 RX ADMIN — SENNOSIDES AND DOCUSATE SODIUM 1 TABLET: 50; 8.6 TABLET ORAL at 08:08

## 2024-08-26 RX ADMIN — CARVEDILOL 25 MG: 25 TABLET, FILM COATED ORAL at 08:08

## 2024-08-26 RX ADMIN — AMLODIPINE BESYLATE 10 MG: 10 TABLET ORAL at 08:08

## 2024-08-26 RX ADMIN — HYDROMORPHONE HYDROCHLORIDE 3 MG: 2 INJECTION INTRAMUSCULAR; INTRAVENOUS; SUBCUTANEOUS at 08:08

## 2024-08-26 RX ADMIN — PROCHLORPERAZINE EDISYLATE 2.5 MG: 5 INJECTION INTRAMUSCULAR; INTRAVENOUS at 06:08

## 2024-08-26 RX ADMIN — HYDROMORPHONE HYDROCHLORIDE 1 MG: 1 INJECTION, SOLUTION INTRAMUSCULAR; INTRAVENOUS; SUBCUTANEOUS at 08:08

## 2024-08-26 RX ADMIN — HYDROMORPHONE HYDROCHLORIDE 3 MG: 2 INJECTION INTRAMUSCULAR; INTRAVENOUS; SUBCUTANEOUS at 02:08

## 2024-08-26 RX ADMIN — DIPHENHYDRAMINE HYDROCHLORIDE 25 MG: 50 INJECTION, SOLUTION INTRAMUSCULAR; INTRAVENOUS at 05:08

## 2024-08-26 RX ADMIN — HYDROMORPHONE HYDROCHLORIDE 3 MG: 2 INJECTION INTRAMUSCULAR; INTRAVENOUS; SUBCUTANEOUS at 10:08

## 2024-08-26 RX ADMIN — HYDROXYUREA 1000 MG: 500 CAPSULE ORAL at 08:08

## 2024-08-26 RX ADMIN — DIPHENHYDRAMINE HYDROCHLORIDE 25 MG: 50 INJECTION, SOLUTION INTRAMUSCULAR; INTRAVENOUS at 08:08

## 2024-08-26 RX ADMIN — TIZANIDINE 4 MG: 2 TABLET ORAL at 05:08

## 2024-08-26 RX ADMIN — LACTULOSE 20 G: 20 SOLUTION ORAL at 08:08

## 2024-08-26 RX ADMIN — HYDROMORPHONE HYDROCHLORIDE 3 MG: 2 INJECTION INTRAMUSCULAR; INTRAVENOUS; SUBCUTANEOUS at 01:08

## 2024-08-26 RX ADMIN — ACETAMINOPHEN 1000 MG: 500 TABLET ORAL at 08:08

## 2024-08-26 NOTE — ASSESSMENT & PLAN NOTE
Patient had recently gotten port on left chest removed and admits to pain on site.   Repeat US report shows: Interval increase in size of a complex subcutaneous collection in the left upper chest, measuring up to 5.4 cm.     Plan:  Consulted general surgery, feel more likely hematoma. No I&D scheduled.  - Doxycycline 100 BID for 5 days  -Consider repeat U/S to assess for change in size of hematoma

## 2024-08-26 NOTE — ASSESSMENT & PLAN NOTE
-Baseline Hgb is around 9  -Macrocytic MCV; most likely 2/2 to Beta Thalassemia   Recent Labs     08/24/24  0427 08/25/24  0516 08/26/24  0703   HGB 10.2* 9.4* 9.0*   HCT 31.4* 29.5* 27.1*       Plan  - Monitor serial CBC: Daily  - Transfuse PRBC if patient becomes hemodynamically unstable, symptomatic or H/H drops below 7/21.

## 2024-08-26 NOTE — SUBJECTIVE & OBJECTIVE
Interval History: No acute concerns this morning. Endorsing ongoing pain at the left port site extraction site, RUQ and the right leg. Denies shortness of breath, abdominal pain, or other acute issues overnight..    Review of Systems  Objective:     Vital Signs (Most Recent):  Temp: 98.1 °F (36.7 °C) (08/26/24 0419)  Pulse: 71 (08/26/24 0419)  Resp: 18 (08/26/24 0830)  BP: 112/77 (08/26/24 0831)  SpO2: 97 % (08/26/24 0419) Vital Signs (24h Range):  Temp:  [98.1 °F (36.7 °C)-99.1 °F (37.3 °C)] 98.1 °F (36.7 °C)  Pulse:  [71-78] 71  Resp:  [16-18] 18  SpO2:  [97 %-99 %] 97 %  BP: ()/(64-87) 112/77     Weight: 109.5 kg (241 lb 4.8 oz)  Body mass index is 44.13 kg/m².    Intake/Output Summary (Last 24 hours) at 8/26/2024 0833  Last data filed at 8/26/2024 0553  Gross per 24 hour   Intake 720 ml   Output 1750 ml   Net -1030 ml         Physical Exam        Significant Labs: All pertinent labs within the past 24 hours have been reviewed.  Recent Lab Results         08/26/24  0703   08/25/24  1338        Albumin 3.3         ALP 65         ALT 19         Anion Gap 6         AST 23         Baso # 0.04         Basophil % 0.6         BILIRUBIN TOTAL 0.5  Comment: For infants and newborns, interpretation of results should be based  on gestational age, weight and in agreement with clinical  observations.    Premature Infant recommended reference ranges:  Up to 24 hours.............<8.0 mg/dL  Up to 48 hours............<12.0 mg/dL  3-5 days..................<15.0 mg/dL  6-29 days.................<15.0 mg/dL           BUN 19         Calcium 9.5         Chloride 108         CO2 25         Creatinine 1.2         Differential Method Automated         eGFR 56.5         Eos # 0.3         Eos % 4.8         Glucose 76         Gran # (ANC) 2.2         Gran % 35.9         Hematocrit 27.1         Hemoglobin 9.0         Heparin Anti-AX Assay   1.38  Comment: Expected therapeutic range for Unfractionated heparin (UFH)  is 0.3-0.7  IU/mL.  The therapeutic range for low molecular weight heparins   (LMWH) varies with the type and , but is   typically between 0.4 and 1.1 IU/mL.         Immature Grans (Abs) 0.01  Comment: Mild elevation in immature granulocytes is non specific and   can be seen in a variety of conditions including stress response,   acute inflammation, trauma and pregnancy. Correlation with other   laboratory and clinical findings is essential.           Immature Granulocytes 0.2         Lymph # 3.0         Lymph % 48.0         Magnesium  1.8         MCH 25.1         MCHC 33.2         MCV 76         Mono # 0.7         Mono % 10.5         MPV 10.6         nRBC 1         Phosphorus Level 4.0         Platelet Count 397         Potassium 4.2         PROTEIN TOTAL 6.6         RBC 3.59         RDW 21.1         Sodium 139         WBC 6.19                 Significant Imaging: I have reviewed all pertinent imaging results/findings within the past 24 hours.

## 2024-08-26 NOTE — PLAN OF CARE
Problem: Adult Inpatient Plan of Care  Goal: Plan of Care Review  Outcome: Progressing  Goal: Patient-Specific Goal (Individualized)  Outcome: Progressing  Goal: Absence of Hospital-Acquired Illness or Injury  Outcome: Progressing  Goal: Optimal Comfort and Wellbeing  Outcome: Progressing  Goal: Readiness for Transition of Care  Outcome: Progressing     Problem: Acute Kidney Injury/Impairment  Goal: Fluid and Electrolyte Balance  Outcome: Progressing     Problem: Pneumonia  Goal: Fluid Balance  Outcome: Progressing

## 2024-08-26 NOTE — ASSESSMENT & PLAN NOTE
44 year old female with history of SC anemia with history of acute chest, beta thalassemia, HTN, obesity, iron deficiency anemia, hx of PE on apixaban, RLE fasciotomy, and recent hospitalization for bacteremia s/p antibiotic course and recent sickle cell pain crisis.     Plan  -Continue titrating pain meds  -LR IV fluids  -Pain control:   -continue IV push Dilaudid 3mg Q4 and switched to oxycodone 12 hour tablet 10 mg (plan to switch to Dilaudid 2mg Q4 and oxy 20 8/27)   -Acetaminophen 1g PRN   -Continue hydroxyurea 1000mg and titrate PRN                -consider PO benadryl 25mg PRN for pruritus              -Bowel regimen: senna and lactulose once daily  -Avoid NSAIDs due to allergies

## 2024-08-26 NOTE — PROGRESS NOTES
[facility] ([address] Medicine  Progress Note    Patient Name: Nazanin Malone  MRN: [id number]  Patient Class: IP- Inpatient   Admission Date: 8/22/2024  Length of Stay: 3 days  Attending Physician: Chase Gasca III*  Primary Care Provider: Pee Montgomery MD        Subjective:     Principal Problem:Sickle cell pain crisis        HPI:  43yo F with hx of sickle cell beta thalassemia disease, HTN, asthma, depression, PE comes in to ED with chest pain and abdominal pain. Patient also mentions associated diarrhea, bilious vomiting, productive cough with clear sputum, decreased appetite, and slight dyspnea at rest. Patient was recently discharged for a sickle cell crisis, PE, UTI and feels like this sickle cell again. She is adherent to the Lovenox and had almost finished course of Keflex except for the last dose which she threw up.     Patient states the abdominal pain is diffuse but worse on the RUQ and with palpation. Patient has some diffuse chest pain that is worsened with palpation around especially around the port sites. Patient states all this symptoms she is experiencing started yesterday. Patient currently takes MS contin BID and Percocet Q6H for pain and promethazine for nausea. She was give lasix from her last admission due to to upper extremity edema and currently does not take hydroxyurea but expresses wishes to take it. Patient denies any dysuria and hematuria or any recent stressors that could have caused this latest flare of sickle cell.     In the ED, CBC, CMP, CXR and RUQ US was ordered and Dilaudid and Morphine were given for pain control.      Overview/Hospital Course:  Patient was admitted due to vomiting, diarrhea and chest  and abdominal pain. Pain is being controlled with her home medications along with dilaudid PCA pump.  PCA pump cancelled on 8/23. Pain regimen transitioned to IV dilaudid with breakthrough oxy PRN resulting in improvement in pain control.  Pain will be reassessed again daily.      Gen surg consulted for growing and painful area on left chest where port used to be, believe it is more likely a hematoma, will not perform I&D at this time.     Interval History: No acute concerns this morning. Endorsing ongoing pain at the left port site extraction site, RUQ and the right leg. Denies shortness of breath, abdominal pain, or other acute issues overnight..    Review of Systems  Objective:     Vital Signs (Most Recent):  Temp: 98.1 °F (36.7 °C) (08/26/24 0419)  Pulse: 71 (08/26/24 0419)  Resp: 18 (08/26/24 0830)  BP: 112/77 (08/26/24 0831)  SpO2: 97 % (08/26/24 0419) Vital Signs (24h Range):  Temp:  [98.1 °F (36.7 °C)-99.1 °F (37.3 °C)] 98.1 °F (36.7 °C)  Pulse:  [71-78] 71  Resp:  [16-18] 18  SpO2:  [97 %-99 %] 97 %  BP: ()/(64-87) 112/77     Weight: 109.5 kg (241 lb 4.8 oz)  Body mass index is 44.13 kg/m².    Intake/Output Summary (Last 24 hours) at 8/26/2024 0833  Last data filed at 8/26/2024 0553  Gross per 24 hour   Intake 720 ml   Output 1750 ml   Net -1030 ml         Physical Exam        Significant Labs: All pertinent labs within the past 24 hours have been reviewed.  Recent Lab Results         08/26/24  0703   08/25/24  1338        Albumin 3.3         ALP 65         ALT 19         Anion Gap 6         AST 23         Baso # 0.04         Basophil % 0.6         BILIRUBIN TOTAL 0.5  Comment: For infants and newborns, interpretation of results should be based  on gestational age, weight and in agreement with clinical  observations.    Premature Infant recommended reference ranges:  Up to 24 hours.............<8.0 mg/dL  Up to 48 hours............<12.0 mg/dL  3-5 days..................<15.0 mg/dL  6-29 days.................<15.0 mg/dL           BUN 19         Calcium 9.5         Chloride 108         CO2 25         Creatinine 1.2         Differential Method Automated         eGFR 56.5         Eos # 0.3         Eos % 4.8         Glucose 76         Gran #  (ANC) 2.2         Gran % 35.9         Hematocrit 27.1         Hemoglobin 9.0         Heparin Anti-AX Assay   1.38  Comment: Expected therapeutic range for Unfractionated heparin (UFH)  is 0.3-0.7 IU/mL.  The therapeutic range for low molecular weight heparins   (LMWH) varies with the type and , but is   typically between 0.4 and 1.1 IU/mL.         Immature Grans (Abs) 0.01  Comment: Mild elevation in immature granulocytes is non specific and   can be seen in a variety of conditions including stress response,   acute inflammation, trauma and pregnancy. Correlation with other   laboratory and clinical findings is essential.           Immature Granulocytes 0.2         Lymph # 3.0         Lymph % 48.0         Magnesium  1.8         MCH 25.1         MCHC 33.2         MCV 76         Mono # 0.7         Mono % 10.5         MPV 10.6         nRBC 1         Phosphorus Level 4.0         Platelet Count 397         Potassium 4.2         PROTEIN TOTAL 6.6         RBC 3.59         RDW 21.1         Sodium 139         WBC 6.19                 Significant Imaging: I have reviewed all pertinent imaging results/findings within the past 24 hours.    Assessment/Plan:      * Sickle cell pain crisis  44 year old female with history of SC anemia with history of acute chest, beta thalassemia, HTN, obesity, iron deficiency anemia, hx of PE on apixaban, RLE fasciotomy, and recent hospitalization for bacteremia s/p antibiotic course and recent sickle cell pain crisis.     Plan  -Continue titrating pain meds  -LR IV fluids  -Pain control:   -continue IV push Dilaudid 3mg Q4 and switched to oxycodone 12 hour tablet 10 mg (plan to switch to Dilaudid 2mg Q4 and oxy 20 8/27)   -Acetaminophen 1g PRN   -Continue hydroxyurea 1000mg and titrate PRN                -consider PO benadryl 25mg PRN for pruritus              -Bowel regimen: senna and lactulose once daily  -Avoid NSAIDs due to allergies          Nausea and vomiting  Likely secondary  to SC crisis pain  Pt previously responded well to compazine and promethazine but poorly to zofran    Plan:  -Promethazine 25mg Q6H PRN, and compazine 2.5mg Q6H PRN      Fluid collection at surgical site  Patient had recently gotten port on left chest removed and admits to pain on site.   Repeat US report shows: Interval increase in size of a complex subcutaneous collection in the left upper chest, measuring up to 5.4 cm.     Plan:  Consulted general surgery, feel more likely hematoma. No I&D scheduled.  - Doxycycline 100 BID for 5 days  -Consider repeat U/S to assess for change in size of hematoma          Diarrhea  Pt had 2 episodes before admission and No recent sick contacts, Pt had recent hospitalization and abx use but on chronic opoid use. Possible infectious etiology like C diff considered, however has not had a bowel movement since 8/22.      Plan  -Patient has not had BM since admission  - Continue to monitor     Anemia  -Baseline Hgb is around 9  -Macrocytic MCV; most likely 2/2 to Beta Thalassemia   Recent Labs     08/24/24  0427 08/25/24  0516 08/26/24  0703   HGB 10.2* 9.4* 9.0*   HCT 31.4* 29.5* 27.1*       Plan  - Monitor serial CBC: Daily  - Transfuse PRBC if patient becomes hemodynamically unstable, symptomatic or H/H drops below 7/21.    History of pulmonary embolism  -Therapeutic dose of Enoxaparin due to hx of clots and PE. Today complained of right leg paresthesias/pain, right leg mildly warmer on exam, brigid sign positive.     Plan:   - US LE showed no DVT    Anxiety  -continue flouxetine      Hypertension  -resume home meds below    Hypertension Medications               amLODIPine (NORVASC) 10 MG tablet Take 1 tablet (10 mg total) by mouth once daily.    carvediloL (COREG) 25 MG tablet Take 1 tablet (25 mg total) by mouth 2 (two) times daily.                   VTE Risk Mitigation (From admission, onward)           Ordered     enoxaparin injection 90 mg  2 times daily         08/26/24 1117      IP VTE HIGH RISK PATIENT  Once         08/22/24 1326     Place sequential compression device  Until discontinued         08/22/24 1326                    Discharge Planning   ALYSE: 8/30/2024     Code Status: Full Code   Is the patient medically ready for discharge?:     Reason for patient still in hospital (select all that apply): Treatment  Discharge Plan A: Home                  Mike Sanders MD  Department of Hospital Medicine   [facility] ([place]-[facility])

## 2024-08-27 PROBLEM — K59.00 CONSTIPATION: Status: ACTIVE | Noted: 2024-08-22

## 2024-08-27 LAB
ANION GAP SERPL CALC-SCNC: 6 MMOL/L (ref 8–16)
BACTERIA BLD CULT: NORMAL
BACTERIA BLD CULT: NORMAL
BASOPHILS # BLD AUTO: 0.05 K/UL (ref 0–0.2)
BASOPHILS NFR BLD: 0.9 % (ref 0–1.9)
BUN SERPL-MCNC: 20 MG/DL (ref 6–20)
CALCIUM SERPL-MCNC: 9.3 MG/DL (ref 8.7–10.5)
CHLORIDE SERPL-SCNC: 107 MMOL/L (ref 95–110)
CO2 SERPL-SCNC: 23 MMOL/L (ref 23–29)
CREAT SERPL-MCNC: 1.4 MG/DL (ref 0.5–1.4)
DIFFERENTIAL METHOD BLD: ABNORMAL
EOSINOPHIL # BLD AUTO: 0.3 K/UL (ref 0–0.5)
EOSINOPHIL NFR BLD: 4.7 % (ref 0–8)
ERYTHROCYTE [DISTWIDTH] IN BLOOD BY AUTOMATED COUNT: 21.6 % (ref 11.5–14.5)
EST. GFR  (NO RACE VARIABLE): 47 ML/MIN/1.73 M^2
GLUCOSE SERPL-MCNC: 96 MG/DL (ref 70–110)
HCT VFR BLD AUTO: 30.1 % (ref 37–48.5)
HGB BLD-MCNC: 9.5 G/DL (ref 12–16)
IMM GRANULOCYTES # BLD AUTO: 0.01 K/UL (ref 0–0.04)
IMM GRANULOCYTES NFR BLD AUTO: 0.2 % (ref 0–0.5)
LYMPHOCYTES # BLD AUTO: 2.6 K/UL (ref 1–4.8)
LYMPHOCYTES NFR BLD: 48.9 % (ref 18–48)
MCH RBC QN AUTO: 24.7 PG (ref 27–31)
MCHC RBC AUTO-ENTMCNC: 31.6 G/DL (ref 32–36)
MCV RBC AUTO: 78 FL (ref 82–98)
MONOCYTES # BLD AUTO: 0.8 K/UL (ref 0.3–1)
MONOCYTES NFR BLD: 15.1 % (ref 4–15)
NEUTROPHILS # BLD AUTO: 1.6 K/UL (ref 1.8–7.7)
NEUTROPHILS NFR BLD: 30.2 % (ref 38–73)
NRBC BLD-RTO: 1 /100 WBC
PLATELET # BLD AUTO: 350 K/UL (ref 150–450)
PMV BLD AUTO: 10.6 FL (ref 9.2–12.9)
POTASSIUM SERPL-SCNC: 4 MMOL/L (ref 3.5–5.1)
RBC # BLD AUTO: 3.85 M/UL (ref 4–5.4)
SODIUM SERPL-SCNC: 136 MMOL/L (ref 136–145)
WBC # BLD AUTO: 5.36 K/UL (ref 3.9–12.7)

## 2024-08-27 PROCEDURE — 25000003 PHARM REV CODE 250

## 2024-08-27 PROCEDURE — 63600175 PHARM REV CODE 636 W HCPCS

## 2024-08-27 PROCEDURE — 63600175 PHARM REV CODE 636 W HCPCS: Performed by: FAMILY MEDICINE

## 2024-08-27 PROCEDURE — 80048 BASIC METABOLIC PNL TOTAL CA: CPT | Performed by: FAMILY MEDICINE

## 2024-08-27 PROCEDURE — 11000001 HC ACUTE MED/SURG PRIVATE ROOM

## 2024-08-27 PROCEDURE — 36415 COLL VENOUS BLD VENIPUNCTURE: CPT

## 2024-08-27 PROCEDURE — 25000003 PHARM REV CODE 250: Performed by: FAMILY MEDICINE

## 2024-08-27 PROCEDURE — 36415 COLL VENOUS BLD VENIPUNCTURE: CPT | Performed by: FAMILY MEDICINE

## 2024-08-27 PROCEDURE — 85025 COMPLETE CBC W/AUTO DIFF WBC: CPT

## 2024-08-27 RX ORDER — AMOXICILLIN 250 MG
2 CAPSULE ORAL 2 TIMES DAILY
Status: DISCONTINUED | OUTPATIENT
Start: 2024-08-27 | End: 2024-08-30

## 2024-08-27 RX ORDER — HYDROMORPHONE HYDROCHLORIDE 2 MG/ML
3 INJECTION, SOLUTION INTRAMUSCULAR; INTRAVENOUS; SUBCUTANEOUS EVERY 4 HOURS
Status: DISCONTINUED | OUTPATIENT
Start: 2024-08-27 | End: 2024-08-28

## 2024-08-27 RX ORDER — LIDOCAINE 50 MG/G
1 PATCH TOPICAL
Status: DISCONTINUED | OUTPATIENT
Start: 2024-08-27 | End: 2024-08-30

## 2024-08-27 RX ORDER — SODIUM CHLORIDE, SODIUM LACTATE, POTASSIUM CHLORIDE, CALCIUM CHLORIDE 600; 310; 30; 20 MG/100ML; MG/100ML; MG/100ML; MG/100ML
INJECTION, SOLUTION INTRAVENOUS CONTINUOUS
Status: DISCONTINUED | OUTPATIENT
Start: 2024-08-27 | End: 2024-08-31 | Stop reason: HOSPADM

## 2024-08-27 RX ORDER — HYDROMORPHONE HYDROCHLORIDE 2 MG/ML
4 INJECTION, SOLUTION INTRAMUSCULAR; INTRAVENOUS; SUBCUTANEOUS ONCE
Status: COMPLETED | OUTPATIENT
Start: 2024-08-27 | End: 2024-08-27

## 2024-08-27 RX ORDER — OXYCODONE HCL 10 MG/1
20 TABLET, FILM COATED, EXTENDED RELEASE ORAL EVERY 12 HOURS
Status: DISCONTINUED | OUTPATIENT
Start: 2024-08-27 | End: 2024-08-30

## 2024-08-27 RX ORDER — LACTULOSE 10 G/15ML
20 SOLUTION ORAL 2 TIMES DAILY
Status: DISCONTINUED | OUTPATIENT
Start: 2024-08-27 | End: 2024-08-30

## 2024-08-27 RX ORDER — OXYCODONE HCL 10 MG/1
10 TABLET, FILM COATED, EXTENDED RELEASE ORAL ONCE
Status: COMPLETED | OUTPATIENT
Start: 2024-08-27 | End: 2024-08-27

## 2024-08-27 RX ORDER — AMOXICILLIN 250 MG
2 CAPSULE ORAL DAILY
Status: DISCONTINUED | OUTPATIENT
Start: 2024-08-27 | End: 2024-08-27

## 2024-08-27 RX ADMIN — HYDROMORPHONE HYDROCHLORIDE 3 MG: 2 INJECTION INTRAMUSCULAR; INTRAVENOUS; SUBCUTANEOUS at 05:08

## 2024-08-27 RX ADMIN — DIPHENHYDRAMINE HYDROCHLORIDE 25 MG: 50 INJECTION, SOLUTION INTRAMUSCULAR; INTRAVENOUS at 01:08

## 2024-08-27 RX ADMIN — HYDROMORPHONE HYDROCHLORIDE 1 MG: 1 INJECTION, SOLUTION INTRAMUSCULAR; INTRAVENOUS; SUBCUTANEOUS at 11:08

## 2024-08-27 RX ADMIN — LIDOCAINE 5% 1 PATCH: 700 PATCH TOPICAL at 11:08

## 2024-08-27 RX ADMIN — SODIUM CHLORIDE, POTASSIUM CHLORIDE, SODIUM LACTATE AND CALCIUM CHLORIDE: 600; 310; 30; 20 INJECTION, SOLUTION INTRAVENOUS at 11:08

## 2024-08-27 RX ADMIN — DIPHENHYDRAMINE HYDROCHLORIDE 25 MG: 50 INJECTION, SOLUTION INTRAMUSCULAR; INTRAVENOUS at 08:08

## 2024-08-27 RX ADMIN — FOLIC ACID 1 MG: 1 TABLET ORAL at 08:08

## 2024-08-27 RX ADMIN — OXYCODONE HYDROCHLORIDE 20 MG: 10 TABLET, FILM COATED, EXTENDED RELEASE ORAL at 08:08

## 2024-08-27 RX ADMIN — HYDROMORPHONE HYDROCHLORIDE 3 MG: 2 INJECTION INTRAMUSCULAR; INTRAVENOUS; SUBCUTANEOUS at 01:08

## 2024-08-27 RX ADMIN — DIPHENHYDRAMINE HYDROCHLORIDE 25 MG: 50 INJECTION, SOLUTION INTRAMUSCULAR; INTRAVENOUS at 05:08

## 2024-08-27 RX ADMIN — HYDROMORPHONE HYDROCHLORIDE 1 MG: 1 INJECTION, SOLUTION INTRAMUSCULAR; INTRAVENOUS; SUBCUTANEOUS at 08:08

## 2024-08-27 RX ADMIN — OXYCODONE HYDROCHLORIDE 10 MG: 10 TABLET, FILM COATED, EXTENDED RELEASE ORAL at 12:08

## 2024-08-27 RX ADMIN — HYDROMORPHONE HYDROCHLORIDE 1 MG: 1 INJECTION, SOLUTION INTRAMUSCULAR; INTRAVENOUS; SUBCUTANEOUS at 04:08

## 2024-08-27 RX ADMIN — HYDROMORPHONE HYDROCHLORIDE 4 MG: 2 INJECTION INTRAMUSCULAR; INTRAVENOUS; SUBCUTANEOUS at 01:08

## 2024-08-27 RX ADMIN — GABAPENTIN 400 MG: 300 CAPSULE ORAL at 08:08

## 2024-08-27 RX ADMIN — LACTULOSE 20 G: 20 SOLUTION ORAL at 08:08

## 2024-08-27 RX ADMIN — TIZANIDINE 4 MG: 2 TABLET ORAL at 11:08

## 2024-08-27 RX ADMIN — FLUOXETINE HYDROCHLORIDE 40 MG: 20 CAPSULE ORAL at 08:08

## 2024-08-27 RX ADMIN — TIZANIDINE 4 MG: 2 TABLET ORAL at 09:08

## 2024-08-27 RX ADMIN — HYDROXYUREA 1000 MG: 500 CAPSULE ORAL at 08:08

## 2024-08-27 RX ADMIN — SODIUM CHLORIDE, POTASSIUM CHLORIDE, SODIUM LACTATE AND CALCIUM CHLORIDE: 600; 310; 30; 20 INJECTION, SOLUTION INTRAVENOUS at 01:08

## 2024-08-27 RX ADMIN — SENNOSIDES AND DOCUSATE SODIUM 2 TABLET: 50; 8.6 TABLET ORAL at 08:08

## 2024-08-27 RX ADMIN — CARVEDILOL 25 MG: 25 TABLET, FILM COATED ORAL at 08:08

## 2024-08-27 RX ADMIN — ENOXAPARIN SODIUM 90 MG: 120 INJECTION SUBCUTANEOUS at 08:08

## 2024-08-27 RX ADMIN — OXYCODONE HYDROCHLORIDE 10 MG: 10 TABLET, FILM COATED, EXTENDED RELEASE ORAL at 08:08

## 2024-08-27 RX ADMIN — Medication 6 MG: at 08:08

## 2024-08-27 RX ADMIN — AMLODIPINE BESYLATE 10 MG: 10 TABLET ORAL at 08:08

## 2024-08-27 RX ADMIN — HYDROMORPHONE HYDROCHLORIDE 1 MG: 1 INJECTION, SOLUTION INTRAMUSCULAR; INTRAVENOUS; SUBCUTANEOUS at 03:08

## 2024-08-27 RX ADMIN — HYDROMORPHONE HYDROCHLORIDE 3 MG: 2 INJECTION INTRAMUSCULAR; INTRAVENOUS; SUBCUTANEOUS at 09:08

## 2024-08-27 RX ADMIN — DOXYCYCLINE HYCLATE 100 MG: 100 TABLET, COATED ORAL at 08:08

## 2024-08-27 RX ADMIN — DIPHENHYDRAMINE HYDROCHLORIDE 25 MG: 50 INJECTION, SOLUTION INTRAMUSCULAR; INTRAVENOUS at 09:08

## 2024-08-27 RX ADMIN — HYDROMORPHONE HYDROCHLORIDE 3 MG: 2 INJECTION INTRAMUSCULAR; INTRAVENOUS; SUBCUTANEOUS at 08:08

## 2024-08-27 NOTE — ASSESSMENT & PLAN NOTE
-Therapeutic dose of Enoxaparin due to hx of clots and PE. Today complained of right leg paresthesias/pain, right leg mildly warmer on exam, brigid sign positive.     Plan:   - US LE showed no DVT  -consider stopping Enoxaparin if hematoma is seen on L chest and causing pain

## 2024-08-27 NOTE — ASSESSMENT & PLAN NOTE
-Baseline Hgb is around 9  -Macrocytic MCV; most likely 2/2 to Beta Thalassemia   Recent Labs     08/25/24  0516 08/26/24  0703 08/27/24  0942   HGB 9.4* 9.0* 9.5*   HCT 29.5* 27.1* 30.1*     Plan  - Monitor serial CBC: Daily  - Transfuse PRBC if patient becomes hemodynamically unstable, symptomatic or H/H drops below 7/21.

## 2024-08-27 NOTE — ASSESSMENT & PLAN NOTE
44 year old female with history of SC anemia with history of acute chest, beta thalassemia, HTN, obesity, iron deficiency anemia, hx of PE on apixaban, RLE fasciotomy, and recent hospitalization for bacteremia s/p antibiotic course and recent sickle cell pain crisis.     Plan  -Continue titrating pain meds  -Pain control:   - IV push Dilaudid 3mg Q4 and  oxycodone 12 hour tablet 20 mg (plan to switch to Dilaudid 2mg Q4 and oxy 20 8/28 if pain lessens)   -Acetaminophen 1g PRN   - hydroxyurea 1000mg and titrate PRN  -Stopped LR fluids (8/26)                -consider PO benadryl 25mg PRN for pruritus              -Bowel regimen: lactulose and senna  -Avoid NSAIDs due to allergies

## 2024-08-27 NOTE — ASSESSMENT & PLAN NOTE
Pt had 2 episodes of diarrhea before admission and No recent sick contacts, Pt had recent hospitalization and abx use but on chronic opoid use. Possible infectious etiology like C diff considered, however has not had a bowel movement since 8/22.      Plan  -Patient has not had BM since admission  - Lactulose and Senna

## 2024-08-27 NOTE — PROGRESS NOTES
[facility] ([address] Medicine  Progress Note    Patient Name: Nazanin Malone  MRN: [id number]  Patient Class: IP- Inpatient   Admission Date: 8/22/2024  Length of Stay: 4 days  Attending Physician: Chase Gasca III*  Primary Care Provider: Pee Montgomery MD        Subjective:     Principal Problem:Sickle cell pain crisis        HPI:  43yo F with hx of sickle cell beta thalassemia disease, HTN, asthma, depression, PE comes in to ED with chest pain and abdominal pain. Patient also mentions associated diarrhea, bilious vomiting, productive cough with clear sputum, decreased appetite, and slight dyspnea at rest. Patient was recently discharged for a sickle cell crisis, PE, UTI and feels like this sickle cell again. She is adherent to the Lovenox and had almost finished course of Keflex except for the last dose which she threw up.     Patient states the abdominal pain is diffuse but worse on the RUQ and with palpation. Patient has some diffuse chest pain that is worsened with palpation around especially around the port sites. Patient states all this symptoms she is experiencing started yesterday. Patient currently takes MS contin BID and Percocet Q6H for pain and promethazine for nausea. She was give lasix from her last admission due to to upper extremity edema and currently does not take hydroxyurea but expresses wishes to take it. Patient denies any dysuria and hematuria or any recent stressors that could have caused this latest flare of sickle cell.     In the ED, CBC, CMP, CXR and RUQ US was ordered and Dilaudid and Morphine were given for pain control.      Overview/Hospital Course:  Patient was admitted due to vomiting, diarrhea and chest  and abdominal pain. Pain is being controlled with her home medications along with dilaudid PCA pump.  PCA pump cancelled on 8/23. Pain regimen transitioned to IV dilaudid with breakthrough oxy PRN resulting in improvement in pain control.  Pain will be reassessed again daily.      Gen surg consulted for growing and painful area on left chest where port used to be, believe it is more likely a hematoma, will not perform I&D at this time.     Interval History: Patient stated that her pain was not well-controlled last night.  She states that she is having worsening pain that woke her up at 4:00 a.m. at the left chest where the port was extracted.  She also had a 100.2 temp last night at 8 p.m. and increasing chills.  She denies any dyspnea, abdominal pain, BM.  She has taken lactulose and senna once daily.    Review of Systems  Objective:     Vital Signs (Most Recent):  Temp: 99.5 °F (37.5 °C) (08/27/24 0432)  Pulse: 70 (08/27/24 0432)  Resp: 18 (08/27/24 0553)  BP: 116/81 (08/27/24 0432)  SpO2: 97 % (08/27/24 0432) Vital Signs (24h Range):  Temp:  [97.9 °F (36.6 °C)-100.2 °F (37.9 °C)] 99.5 °F (37.5 °C)  Pulse:  [67-86] 70  Resp:  [17-20] 18  SpO2:  [96 %-98 %] 97 %  BP: (103-135)/(64-83) 116/81       Weight: 109.5 kg (241 lb 4.8 oz)  Body mass index is 44.13 kg/m².    Intake/Output Summary (Last 24 hours) at 8/27/2024 0817  Last data filed at 8/26/2024 1857  Gross per 24 hour   Intake --   Output 800 ml   Net -800 ml         Physical Exam  Constitutional:       Appearance: She is not diaphoretic.   HENT:      Head: Normocephalic and atraumatic.   Cardiovascular:      Rate and Rhythm: Normal rate.      Heart sounds: Normal heart sounds.   Pulmonary:      Effort: Pulmonary effort is normal.      Breath sounds: No wheezing or rales.      Comments: Has port on the right chest.  Has undefined collection/mass on where the old left port was; tenderness at site  Abdominal:      General: Abdomen is flat. There is no distension.      Palpations: Abdomen is soft.      Tenderness: There is no abdominal tenderness. There is no right CVA tenderness or left CVA tenderness.   Musculoskeletal:         General: Deformity present. No swelling.      Comments: Right lower  "leg fasciotomy   Skin:     General: Skin is warm.   Neurological:      Mental Status: She is alert.             Significant Labs: All pertinent labs within the past 24 hours have been reviewed.  Blood Culture: No results for input(s): "LABBLOO" in the last 48 hours.  CBC:   Recent Labs   Lab 08/26/24  0703   WBC 6.19   HGB 9.0*   HCT 27.1*        CMP:   Recent Labs   Lab 08/26/24  0703      K 4.2      CO2 25   GLU 76   BUN 19   CREATININE 1.2   CALCIUM 9.5   PROT 6.6   ALBUMIN 3.3*   BILITOT 0.5   ALKPHOS 65   AST 23   ALT 19   ANIONGAP 6*       Significant Imaging: I have reviewed all pertinent imaging results/findings within the past 24 hours.    Assessment/Plan:      * Sickle cell pain crisis  44 year old female with history of SC anemia with history of acute chest, beta thalassemia, HTN, obesity, iron deficiency anemia, hx of PE on apixaban, RLE fasciotomy, and recent hospitalization for bacteremia s/p antibiotic course and recent sickle cell pain crisis.     Plan  -Continue titrating pain meds  -Pain control:   - IV push Dilaudid 3mg Q4 and  oxycodone 12 hour tablet 20 mg (plan to switch to Dilaudid 2mg Q4 and oxy 20 8/28 if pain lessens)   -Acetaminophen 1g PRN   - hydroxyurea 1000mg and titrate PRN  -Stopped LR fluids (8/26)                -consider PO benadryl 25mg PRN for pruritus              -Bowel regimen: lactulose and senna  -Avoid NSAIDs due to allergies          Fluid collection at surgical site  Patient had recently gotten port on left chest removed and admits to pain on site.   Repeat US report shows: Interval increase in size of a complex subcutaneous collection in the left upper chest, measuring up to 5.4 cm. Consulted general surgery, feel more likely hematoma. No I&D scheduled.    Plan:  -CT chest w/ contrast if kidney (BMP) is fine to assess for change in size of hematoma  -Consider reach out to Surgery pending CT chest, CBC, BMP  - Doxycycline 100 BID for 5 days (finished " 8/27)          Constipation  Pt had 2 episodes of diarrhea before admission and No recent sick contacts, Pt had recent hospitalization and abx use but on chronic opoid use. Possible infectious etiology like C diff considered, however has not had a bowel movement since 8/22.      Plan  -Patient has not had BM since admission  - Lactulose and Senna     Anemia  -Baseline Hgb is around 9  -Macrocytic MCV; most likely 2/2 to Beta Thalassemia   Recent Labs     08/25/24  0516 08/26/24  0703 08/27/24  0942   HGB 9.4* 9.0* 9.5*   HCT 29.5* 27.1* 30.1*     Plan  - Monitor serial CBC: Daily  - Transfuse PRBC if patient becomes hemodynamically unstable, symptomatic or H/H drops below 7/21.    Nausea and vomiting  Likely secondary to SC crisis pain  Pt previously responded well to compazine and promethazine but poorly to zofran    Plan:  -Promethazine 25mg Q6H PRN, and compazine 2.5mg Q6H PRN      History of pulmonary embolism  -Therapeutic dose of Enoxaparin due to hx of clots and PE. Today complained of right leg paresthesias/pain, right leg mildly warmer on exam, brigid sign positive.     Plan:   - US LE showed no DVT  -consider stopping Enoxaparin if hematoma is seen on L chest and causing pain    Hypertension  -resume home meds below    Hypertension Medications               amLODIPine (NORVASC) 10 MG tablet Take 1 tablet (10 mg total) by mouth once daily.    carvediloL (COREG) 25 MG tablet Take 1 tablet (25 mg total) by mouth 2 (two) times daily.                 Anxiety  -continue flouxetine        VTE Risk Mitigation (From admission, onward)           Ordered     enoxaparin injection 90 mg  2 times daily         08/26/24 1117     IP VTE HIGH RISK PATIENT  Once         08/22/24 1326     Place sequential compression device  Until discontinued         08/22/24 1326                    Discharge Planning   ALYSE: 8/30/2024     Code Status: Full Code   Is the patient medically ready for discharge?:     Reason for patient still in  hospital (select all that apply): Patient trending condition  Discharge Plan A: Home   Discharge Delays: None known at this time              Lin Torrez MD  Department of Hospital Medicine   [facility] ([place]

## 2024-08-27 NOTE — MEDICAL/APP STUDENT
[facility]  Progress Note    Patient Name: Nazanin Malone  MRN: [id number]  Patient Class: IP- Inpatient   Admission Date: 8/22/2024  Length of Stay: 4 days  Attending Physician: Chase Gasca III*  Primary Care Provider: Pee Montgomery MD        Subjective:     Principal Problem:Sickle cell pain crisis    HPI:  44F with PMHx of HTN, beta thalassemia, sickle cell anemia (hx of acute chest), hx of PE (on Lovenox), Rhabdo, RLE fasciotomy p/w abdominal and chest pain x1 day. Pain is diffuse but most concentrated in the RUQ. Pt endorses associated n/v stating that she can not tolerate any PO and vomited her home pain meds today. She also reports 2x episodes of diarrhea today. Pt has a hx of frequent SC pain crises and was recently hospitalized for one last week. Pt states the current pain feels similar to past SC crises pain. Pt takes morphine 15mg BIG and percocet 10mg Q6H at home. Pt reports not hydrating well recently. She reports subjective fever() for the last month.     Pt was recently discharged with 7 days of lasix which she says she took for 4 days and stopped once her swelling resolved. Pt took full course of Keflex she was d/c'd with too except the last dose which she vomited. On her last admission she states that compazine and promethazine rotation helped her sxs, but zofran was ineffective. She denies any urinary symptoms and has normal UOP. She reports compliance with all her medications at home including Lovenox which she last took this morning. She can walk but not for long distances at [place]. She reports a hx of severe allergies (throat closing) with NSAID use.     Overview/Hospital Course:  Patient was admitted due to vomiting, diarrhea and chest  and abdominal pain. Pain is being controlled with her home medications along with dilaudid PCA pump.  PCA pump cancelled on 8/23. Pain regimen transitioned to IV dilaudid with  breakthrough oxy PRN resulting in improvement in pain control. Pain will be reassessed again daily.      Gen surg consulted for growing and painful area on left chest where port used to be, believe it is more likely a hematoma, will not perform I&D at this time.       Interval History: NAEON. Today pt reports continued severe pain especially in her upper left chest at the site of her hematoma. Her appetite is improving and she has not vomited again. She has not had a bowel movement in 4 days. She denies any CP or SOB. Denies  sxs. Normal UOP. No complaints otherwise.    Objective:   [unfilled]Weight: 109.5 kg (241 lb 4.8 oz)  Body mass index is 44.13 kg/m².    Intake/Output Summary (Last 24 hours) at 8/27/2024 1806  Last data filed at 8/27/2024 0819  Gross per 24 hour   Intake --   Output 1450 ml   Net -1450 ml     Physical Exam  Constitutional:       Appearance: Normal appearance.   HENT:      Head: Normocephalic and atraumatic.      Nose: Nose normal.      Mouth/Throat:      Mouth: Mucous membranes are moist.   Eyes:      Extraocular Movements: Extraocular movements intact.      Conjunctiva/sclera: Conjunctivae normal.   Cardiovascular:      Rate and Rhythm: Normal rate and regular rhythm.   Pulmonary:      Effort: Pulmonary effort is normal.      Breath sounds: Normal breath sounds.   Abdominal:      General: Bowel sounds are normal.      Palpations: Abdomen is soft, nontender.   Musculoskeletal:         General: No swelling. Tender, firm mass in upper left chest. No erythema.  Skin:     General: Skin is warm.   Neurological:      Mental Status: She is alert. Mental status is at baseline.   Psychiatric:         Mood and Affect: Mood normal.         Behavior: Behavior normal.       Significant Labs: All pertinent labs within the past 24 hours have been reviewed.    Significant Imaging: I have reviewed all pertinent imaging results/findings within the past 24 hours.      Assessment/Plan:     44F with PMHx of  HTN, beta thalassemia, sickle cell anemia (hx of acute chest), hx of PE (on Lovenox), Rhabdo, RLE fasciotomy p/w abdominal and chest pain x1 day. Pt endorses associated n/v stating that she can not tolerate any PO. She also reports 2x episodes of diarrhea. Pt has a hx of frequent SC pain crises and was recently hospitalized for one last week.      SCD Vaso-occlusive Crisis  -CXR and Abdominal US negative  -EKG and Troponin negative  -Lactate normal  -No leukocytosis  -Hb 10.5 with baseline of ~9  -Pt chest pain likely 2/2 SC crisis     Plan:  -maintenance IVF (LR 100ml/hr)  -Continue pain regimen (hydromorphone scheduled and PRN + oxycodone)  -Increase oxycodone dose to 20mg Q12H  -Bowel regimen: scheduled miralax/senna  -Avoid NSAIDs due to allergies  -Continue hydroxyurea 1g, pregnancy test  -VTE prophylaxis  -Monitor vitals and symptoms  -CBC Q24H    Hematoma  -Evaluated by GS w/ no intervention recommended  -Pt reports increasing pain at site  -Doxycycline x5 days  -CT chest   -F/u imaging     Nausea/Vomiting:  -Likely secondary to SC crisis pain  -Pt previously responded well to compazine and promethazine but poorly to zofran  -promethazine 25mg Q6H PRN, and compazine 2.5mg Q6H PRN  -Monitor sxs    Constipation:  -Pt reports no BM for 5 days  -Likely secondary to opiate use/pain  -scheduled miralax/senna/lactulose, increase dose and fequency  -Consider enema if no improvement  -Monitor BMs     Anemia:  -Hb basline of ~9  -Pt has hx of beta thalassemia  -Anemia is macrocytic  -Transfure if Hb<7 or symptomatic/hemodynamically unstable  -CBC Q24H     Hx of Pulmonary Embolism:  -On home Lovenox  -Start Lovenox 1mg/kg  -Monitor for sxs     RLE pain:  -Chronic, poor pain control recently due to d/c'd gabapentin  -Start gabapentin 400mg BID  -Pt has new onset worsening pain  -USS RLE negative for DVT  -Monitor sxs     Hypertension:  -Chronic, well controlled  -Continue home carvedilol 25mg BID, amlodipine 10mg  QD  -Monitor BP, titrate PRN     Anxiety:  -Chronic, well controlled  -Continue home fluoxetine 40mg QD  -Monitor sxs     VTE Risk Mitigation (From admission, onward)           Ordered     enoxaparin injection 90 mg  2 times daily         08/26/24 1117     IP VTE HIGH RISK PATIENT  Once         08/22/24 1326     Place sequential compression device  Until discontinued         08/22/24 1326                  Discharge Planning   ALYSE: 8/30/2024     Code Status: Full Code   Is the patient medically ready for discharge?:     Reason for patient still in hospital (select all that apply): Patient unstable  Discharge Plan A: Home   Discharge Delays: None known at this time    Jeni Hanley  Department of Hospital Medicine   [facility] Telemetry

## 2024-08-27 NOTE — PLAN OF CARE
Pt AAOx4. Respirations are even and unlabored on RA. Right chest wall site CDI. LR infusing @ 100 mL/hr. Pain medications administered as scheduled and PRN. Bed locked in the lowest position with side rails up x2. Call bell within reach.     Problem: Adult Inpatient Plan of Care  Goal: Plan of Care Review  Outcome: Progressing     Problem: Bariatric Environmental Safety  Goal: Safety Maintained with Care  Outcome: Progressing     Problem: Fall Injury Risk  Goal: Absence of Fall and Fall-Related Injury  Outcome: Progressing

## 2024-08-27 NOTE — ASSESSMENT & PLAN NOTE
Patient had recently gotten port on left chest removed and admits to pain on site.   Repeat US report shows: Interval increase in size of a complex subcutaneous collection in the left upper chest, measuring up to 5.4 cm. Consulted general surgery, feel more likely hematoma. No I&D scheduled.    Plan:  -CT chest w/ contrast if kidney (BMP) is fine to assess for change in size of hematoma  -Consider reach out to Surgery pending CT chest, CBC, BMP  - Doxycycline 100 BID for 5 days (finished 8/27)

## 2024-08-27 NOTE — PLAN OF CARE
[facility] Telemetry  Discharge Reassessment    Primary Care Provider: Pee Montgomery MD    Expected Discharge Date: 8/30/2024    Reassessment (most recent)       Discharge Reassessment - 08/27/24 1554          Discharge Reassessment    Assessment Type Discharge Planning Reassessment (P)      Did the patient's condition or plan change since previous assessment? No (P)      Discharge Plan discussed with: Patient (P)      Communicated ALYSE with patient/caregiver No (P)      Discharge Plan A Home (P)      Discharge Plan B Home (P)      DME Needed Upon Discharge  none (P)      Transition of Care Barriers None (P)      Why the patient remains in the hospital Requires continued medical care (P)         Post-Acute Status    Coverage [employer] (P)      Discharge Delays None known at this time (P)                  CM spoke with pt in room.  Dispo: home.  She will drive herself home.  No DME needed.    MAYELA Martinez, BS, RN, CCM      Discharge Plan A and Plan B have been determined by review of patient's clinical status, future medical and therapeutic needs, and coverage/benefits for post-acute care in coordination with multidisciplinary team members.

## 2024-08-27 NOTE — SUBJECTIVE & OBJECTIVE
Interval History: Patient stated that her pain was not well-controlled last night.  She states that she is having worsening pain that woke her up at 4:00 a.m. at the left chest where the port was extracted.  She also had a 100.2 temp last night at 8 p.m. and increasing chills.  She denies any dyspnea, abdominal pain, BM.  She has taken lactulose and senna once daily.    Review of Systems  Objective:     Vital Signs (Most Recent):  Temp: 99.5 °F (37.5 °C) (08/27/24 0432)  Pulse: 70 (08/27/24 0432)  Resp: 18 (08/27/24 0553)  BP: 116/81 (08/27/24 0432)  SpO2: 97 % (08/27/24 0432) Vital Signs (24h Range):  Temp:  [97.9 °F (36.6 °C)-100.2 °F (37.9 °C)] 99.5 °F (37.5 °C)  Pulse:  [67-86] 70  Resp:  [17-20] 18  SpO2:  [96 %-98 %] 97 %  BP: (103-135)/(64-83) 116/81       Weight: 109.5 kg (241 lb 4.8 oz)  Body mass index is 44.13 kg/m².    Intake/Output Summary (Last 24 hours) at 8/27/2024 0817  Last data filed at 8/26/2024 1857  Gross per 24 hour   Intake --   Output 800 ml   Net -800 ml         Physical Exam  Constitutional:       Appearance: She is not diaphoretic.   HENT:      Head: Normocephalic and atraumatic.   Cardiovascular:      Rate and Rhythm: Normal rate.      Heart sounds: Normal heart sounds.   Pulmonary:      Effort: Pulmonary effort is normal.      Breath sounds: No wheezing or rales.      Comments: Has port on the right chest.  Has undefined collection/mass on where the old left port was; tenderness at site  Abdominal:      General: Abdomen is flat. There is no distension.      Palpations: Abdomen is soft.      Tenderness: There is no abdominal tenderness. There is no right CVA tenderness or left CVA tenderness.   Musculoskeletal:         General: Deformity present. No swelling.      Comments: Right lower leg fasciotomy   Skin:     General: Skin is warm.   Neurological:      Mental Status: She is alert.             Significant Labs: All pertinent labs within the past 24 hours have been reviewed.  Blood  "Culture: No results for input(s): "LABBLOO" in the last 48 hours.  CBC:   Recent Labs   Lab 08/26/24  0703   WBC 6.19   HGB 9.0*   HCT 27.1*        CMP:   Recent Labs   Lab 08/26/24  0703      K 4.2      CO2 25   GLU 76   BUN 19   CREATININE 1.2   CALCIUM 9.5   PROT 6.6   ALBUMIN 3.3*   BILITOT 0.5   ALKPHOS 65   AST 23   ALT 19   ANIONGAP 6*       Significant Imaging: I have reviewed all pertinent imaging results/findings within the past 24 hours.  "

## 2024-08-28 LAB
ALBUMIN SERPL BCP-MCNC: 3.1 G/DL (ref 3.5–5.2)
ALP SERPL-CCNC: 65 U/L (ref 55–135)
ALT SERPL W/O P-5'-P-CCNC: 12 U/L (ref 10–44)
ANION GAP SERPL CALC-SCNC: 5 MMOL/L (ref 8–16)
AST SERPL-CCNC: 21 U/L (ref 10–40)
BASOPHILS # BLD AUTO: 0.03 K/UL (ref 0–0.2)
BASOPHILS NFR BLD: 0.5 % (ref 0–1.9)
BILIRUB SERPL-MCNC: 0.4 MG/DL (ref 0.1–1)
BUN SERPL-MCNC: 20 MG/DL (ref 6–20)
CALCIUM SERPL-MCNC: 9.4 MG/DL (ref 8.7–10.5)
CHLORIDE SERPL-SCNC: 106 MMOL/L (ref 95–110)
CO2 SERPL-SCNC: 25 MMOL/L (ref 23–29)
CREAT SERPL-MCNC: 1.1 MG/DL (ref 0.5–1.4)
DIFFERENTIAL METHOD BLD: ABNORMAL
EOSINOPHIL # BLD AUTO: 0.2 K/UL (ref 0–0.5)
EOSINOPHIL NFR BLD: 3.7 % (ref 0–8)
ERYTHROCYTE [DISTWIDTH] IN BLOOD BY AUTOMATED COUNT: 21.6 % (ref 11.5–14.5)
EST. GFR  (NO RACE VARIABLE): >60 ML/MIN/1.73 M^2
FACT X PPP CHRO-ACNC: 1.39 IU/ML (ref 0.3–0.7)
GLUCOSE SERPL-MCNC: 73 MG/DL (ref 70–110)
HCT VFR BLD AUTO: 26.9 % (ref 37–48.5)
HGB BLD-MCNC: 8.9 G/DL (ref 12–16)
IMM GRANULOCYTES # BLD AUTO: 0.01 K/UL (ref 0–0.04)
IMM GRANULOCYTES NFR BLD AUTO: 0.2 % (ref 0–0.5)
LYMPHOCYTES # BLD AUTO: 3.4 K/UL (ref 1–4.8)
LYMPHOCYTES NFR BLD: 52.5 % (ref 18–48)
MAGNESIUM SERPL-MCNC: 1.9 MG/DL (ref 1.6–2.6)
MCH RBC QN AUTO: 25 PG (ref 27–31)
MCHC RBC AUTO-ENTMCNC: 33.1 G/DL (ref 32–36)
MCV RBC AUTO: 76 FL (ref 82–98)
MONOCYTES # BLD AUTO: 0.8 K/UL (ref 0.3–1)
MONOCYTES NFR BLD: 11.8 % (ref 4–15)
NEUTROPHILS # BLD AUTO: 2 K/UL (ref 1.8–7.7)
NEUTROPHILS NFR BLD: 31.3 % (ref 38–73)
NRBC BLD-RTO: 1 /100 WBC
PHOSPHATE SERPL-MCNC: 3.4 MG/DL (ref 2.7–4.5)
PLATELET # BLD AUTO: 334 K/UL (ref 150–450)
PMV BLD AUTO: 10.9 FL (ref 9.2–12.9)
POTASSIUM SERPL-SCNC: 4.3 MMOL/L (ref 3.5–5.1)
PROT SERPL-MCNC: 6.4 G/DL (ref 6–8.4)
RBC # BLD AUTO: 3.56 M/UL (ref 4–5.4)
SODIUM SERPL-SCNC: 136 MMOL/L (ref 136–145)
WBC # BLD AUTO: 6.42 K/UL (ref 3.9–12.7)

## 2024-08-28 PROCEDURE — 25000003 PHARM REV CODE 250

## 2024-08-28 PROCEDURE — 84100 ASSAY OF PHOSPHORUS: CPT

## 2024-08-28 PROCEDURE — 83735 ASSAY OF MAGNESIUM: CPT

## 2024-08-28 PROCEDURE — 63600175 PHARM REV CODE 636 W HCPCS: Performed by: FAMILY MEDICINE

## 2024-08-28 PROCEDURE — 36415 COLL VENOUS BLD VENIPUNCTURE: CPT

## 2024-08-28 PROCEDURE — 85025 COMPLETE CBC W/AUTO DIFF WBC: CPT

## 2024-08-28 PROCEDURE — 36415 COLL VENOUS BLD VENIPUNCTURE: CPT | Performed by: FAMILY MEDICINE

## 2024-08-28 PROCEDURE — 11000001 HC ACUTE MED/SURG PRIVATE ROOM

## 2024-08-28 PROCEDURE — 85520 HEPARIN ASSAY: CPT | Performed by: FAMILY MEDICINE

## 2024-08-28 PROCEDURE — 63600175 PHARM REV CODE 636 W HCPCS

## 2024-08-28 PROCEDURE — 80053 COMPREHEN METABOLIC PANEL: CPT

## 2024-08-28 PROCEDURE — 25000003 PHARM REV CODE 250: Performed by: FAMILY MEDICINE

## 2024-08-28 PROCEDURE — 25500020 PHARM REV CODE 255: Performed by: FAMILY MEDICINE

## 2024-08-28 RX ORDER — OXYCODONE HYDROCHLORIDE 10 MG/1
10 TABLET ORAL EVERY 4 HOURS PRN
Status: DISCONTINUED | OUTPATIENT
Start: 2024-08-28 | End: 2024-08-29

## 2024-08-28 RX ORDER — HYDROMORPHONE HYDROCHLORIDE 2 MG/ML
4 INJECTION, SOLUTION INTRAMUSCULAR; INTRAVENOUS; SUBCUTANEOUS EVERY 4 HOURS
Status: DISCONTINUED | OUTPATIENT
Start: 2024-08-28 | End: 2024-08-29

## 2024-08-28 RX ORDER — HYDROMORPHONE HYDROCHLORIDE 2 MG/ML
4 INJECTION, SOLUTION INTRAMUSCULAR; INTRAVENOUS; SUBCUTANEOUS ONCE
Status: COMPLETED | OUTPATIENT
Start: 2024-08-28 | End: 2024-08-28

## 2024-08-28 RX ADMIN — HYDROMORPHONE HYDROCHLORIDE 1 MG: 1 INJECTION, SOLUTION INTRAMUSCULAR; INTRAVENOUS; SUBCUTANEOUS at 10:08

## 2024-08-28 RX ADMIN — LACTULOSE 20 G: 20 SOLUTION ORAL at 09:08

## 2024-08-28 RX ADMIN — ENOXAPARIN SODIUM 90 MG: 120 INJECTION SUBCUTANEOUS at 09:08

## 2024-08-28 RX ADMIN — CARVEDILOL 25 MG: 25 TABLET, FILM COATED ORAL at 09:08

## 2024-08-28 RX ADMIN — GABAPENTIN 400 MG: 300 CAPSULE ORAL at 09:08

## 2024-08-28 RX ADMIN — SENNOSIDES AND DOCUSATE SODIUM 2 TABLET: 50; 8.6 TABLET ORAL at 09:08

## 2024-08-28 RX ADMIN — AMLODIPINE BESYLATE 10 MG: 10 TABLET ORAL at 09:08

## 2024-08-28 RX ADMIN — HYDROMORPHONE HYDROCHLORIDE 4 MG: 2 INJECTION INTRAMUSCULAR; INTRAVENOUS; SUBCUTANEOUS at 09:08

## 2024-08-28 RX ADMIN — HYDROMORPHONE HYDROCHLORIDE 3 MG: 2 INJECTION INTRAMUSCULAR; INTRAVENOUS; SUBCUTANEOUS at 02:08

## 2024-08-28 RX ADMIN — DIPHENHYDRAMINE HYDROCHLORIDE 25 MG: 50 INJECTION, SOLUTION INTRAMUSCULAR; INTRAVENOUS at 02:08

## 2024-08-28 RX ADMIN — HYDROMORPHONE HYDROCHLORIDE 3 MG: 2 INJECTION INTRAMUSCULAR; INTRAVENOUS; SUBCUTANEOUS at 01:08

## 2024-08-28 RX ADMIN — OXYCODONE HYDROCHLORIDE 20 MG: 10 TABLET, FILM COATED, EXTENDED RELEASE ORAL at 09:08

## 2024-08-28 RX ADMIN — DIPHENHYDRAMINE HYDROCHLORIDE 25 MG: 50 INJECTION, SOLUTION INTRAMUSCULAR; INTRAVENOUS at 05:08

## 2024-08-28 RX ADMIN — HYDROMORPHONE HYDROCHLORIDE 4 MG: 2 INJECTION INTRAMUSCULAR; INTRAVENOUS; SUBCUTANEOUS at 12:08

## 2024-08-28 RX ADMIN — TIZANIDINE 4 MG: 2 TABLET ORAL at 09:08

## 2024-08-28 RX ADMIN — DIPHENHYDRAMINE HYDROCHLORIDE 25 MG: 50 INJECTION, SOLUTION INTRAMUSCULAR; INTRAVENOUS at 06:08

## 2024-08-28 RX ADMIN — DIPHENHYDRAMINE HYDROCHLORIDE 25 MG: 50 INJECTION, SOLUTION INTRAMUSCULAR; INTRAVENOUS at 10:08

## 2024-08-28 RX ADMIN — FOLIC ACID 1 MG: 1 TABLET ORAL at 09:08

## 2024-08-28 RX ADMIN — DIPHENHYDRAMINE HYDROCHLORIDE 25 MG: 50 INJECTION, SOLUTION INTRAMUSCULAR; INTRAVENOUS at 09:08

## 2024-08-28 RX ADMIN — HYDROMORPHONE HYDROCHLORIDE 1 MG: 1 INJECTION, SOLUTION INTRAMUSCULAR; INTRAVENOUS; SUBCUTANEOUS at 06:08

## 2024-08-28 RX ADMIN — FLUOXETINE HYDROCHLORIDE 40 MG: 20 CAPSULE ORAL at 09:08

## 2024-08-28 RX ADMIN — TIZANIDINE 4 MG: 2 TABLET ORAL at 06:08

## 2024-08-28 RX ADMIN — HYDROMORPHONE HYDROCHLORIDE 3 MG: 2 INJECTION INTRAMUSCULAR; INTRAVENOUS; SUBCUTANEOUS at 09:08

## 2024-08-28 RX ADMIN — DIPHENHYDRAMINE HYDROCHLORIDE 25 MG: 50 INJECTION, SOLUTION INTRAMUSCULAR; INTRAVENOUS at 01:08

## 2024-08-28 RX ADMIN — HYDROMORPHONE HYDROCHLORIDE 4 MG: 2 INJECTION INTRAMUSCULAR; INTRAVENOUS; SUBCUTANEOUS at 05:08

## 2024-08-28 RX ADMIN — IOHEXOL 100 ML: 350 INJECTION, SOLUTION INTRAVENOUS at 02:08

## 2024-08-28 RX ADMIN — HYDROXYUREA 1000 MG: 500 CAPSULE ORAL at 09:08

## 2024-08-28 RX ADMIN — HYDROMORPHONE HYDROCHLORIDE 3 MG: 2 INJECTION INTRAMUSCULAR; INTRAVENOUS; SUBCUTANEOUS at 05:08

## 2024-08-28 NOTE — PROGRESS NOTES
[facility] Internal Medicine [facility] Medicine  Progress Note    Patient Name: Nazanin Malone  MRN: [id number]  Patient Class: IP- Inpatient   Admission Date: 8/22/2024  Length of Stay: 5 days  Attending Physician: Chase Gasca III*  Primary Care Provider: Pee Motngomery MD        Subjective:     Principal Problem:Sickle cell pain crisis        HPI:  43yo F with hx of sickle cell beta thalassemia disease, HTN, asthma, depression, PE comes in to ED with chest pain and abdominal pain. Patient also mentions associated diarrhea, bilious vomiting, productive cough with clear sputum, decreased appetite, and slight dyspnea at rest. Patient was recently discharged for a sickle cell crisis, PE, UTI and feels like this sickle cell again. She is adherent to the Lovenox and had almost finished course of Keflex except for the last dose which she threw up.     Patient states the abdominal pain is diffuse but worse on the RUQ and with palpation. Patient has some diffuse chest pain that is worsened with palpation around especially around the port sites. Patient states all this symptoms she is experiencing started yesterday. Patient currently takes MS contin BID and Percocet Q6H for pain and promethazine for nausea. She was give lasix from her last admission due to to upper extremity edema and currently does not take hydroxyurea but expresses wishes to take it. Patient denies any dysuria and hematuria or any recent stressors that could have caused this latest flare of sickle cell.     In the ED, CBC, CMP, CXR and RUQ US was ordered and Dilaudid and Morphine were given for pain control.      Overview/Hospital Course:  Patient was admitted due to vomiting, diarrhea and chest  and abdominal pain. Pain is being controlled with her home medications along with dilaudid PCA pump.  PCA pump cancelled on 8/23. Pain regimen transitioned to IV dilaudid with breakthrough oxy PRN resulting in improvement in pain  control. Pain will be reassessed again daily.      Gen surg consulted for growing and painful area on left chest where port used to be, believe it is more likely a hematoma, will not perform I&D at this time.     Interval History: Patient was well controlled last night. Patient still complains of pain at the left ex-port site. Patient denied any chest pain, dyspnea, or abdominal pain. Chest CT not done yet.     Review of Systems  Objective:     Vital Signs (Most Recent):  Temp: 98.1 °F (36.7 °C) (08/28/24 0801)  Pulse: 76 (08/28/24 0801)  Resp: 17 (08/28/24 0801)  BP: 134/89 (08/28/24 0801)  SpO2: 99 % (08/28/24 0801) Vital Signs (24h Range):  Temp:  [98.1 °F (36.7 °C)-99 °F (37.2 °C)] 98.1 °F (36.7 °C)  Pulse:  [70-83] 76  Resp:  [16-19] 17  SpO2:  [96 %-99 %] 99 %  BP: (113-134)/(67-89) 134/89     Weight: 109.5 kg (241 lb 4.8 oz)  Body mass index is 44.13 kg/m².    Intake/Output Summary (Last 24 hours) at 8/28/2024 0906  Last data filed at 8/28/2024 0552  Gross per 24 hour   Intake 600 ml   Output --   Net 600 ml         Physical Exam  Constitutional:       Appearance: She is not diaphoretic.   HENT:      Head: Normocephalic and atraumatic.   Cardiovascular:      Rate and Rhythm: Normal rate.      Heart sounds: Normal heart sounds.   Pulmonary:      Effort: Pulmonary effort is normal.      Breath sounds: No wheezing or rales.      Comments: Has port on the right chest.  Has undefined collection/mass on where the old left port was; tenderness at site  Abdominal:      General: Abdomen is flat. There is no distension.      Palpations: Abdomen is soft.      Tenderness: There is no abdominal tenderness. There is no right CVA tenderness or left CVA tenderness.   Musculoskeletal:         General: Deformity present. No swelling.      Comments: Right lower leg fasciotomy   Skin:     General: Skin is warm.   Neurological:      Mental Status: She is alert.             Significant Labs: All pertinent labs within the past 24  hours have been reviewed.  CBC:   Recent Labs   Lab 08/27/24  0942 08/28/24  0621   WBC 5.36 6.42   HGB 9.5* 8.9*   HCT 30.1* 26.9*    334     CMP:   Recent Labs   Lab 08/27/24  1310 08/28/24  0621    136   K 4.0 4.3    106   CO2 23 25   GLU 96 73   BUN 20 20   CREATININE 1.4 1.1   CALCIUM 9.3 9.4   PROT  --  6.4   ALBUMIN  --  3.1*   BILITOT  --  0.4   ALKPHOS  --  65   AST  --  21   ALT  --  12   ANIONGAP 6* 5*       Significant Imaging: I have reviewed all pertinent imaging results/findings within the past 24 hours.  Ct chest pending    Assessment/Plan:      * Sickle cell pain crisis  44 year old female with history of SC anemia with history of acute chest, beta thalassemia, HTN, obesity, iron deficiency anemia, hx of PE on apixaban, RLE fasciotomy, and recent hospitalization for bacteremia s/p antibiotic course and recent sickle cell pain crisis.     Plan  -Continue titrating pain meds  -Pain control:   - IV push Dilaudid 3mg Q4 and  oxycodone 12 hour tablet 20 mg (plan to switch to Dilaudid 2mg Q4 and oxy 20 8/28 if pain lessens)   -Acetaminophen 1g PRN   - hydroxyurea 1000mg and titrate PRN  -Stopped LR fluids (8/26)                -consider PO benadryl 25mg PRN for pruritus              -Bowel regimen: lactulose and senna  -Avoid NSAIDs due to allergies          Fluid collection at surgical site  Patient had recently gotten port on left chest removed and admits to pain on site.   Repeat US report shows: Interval increase in size of a complex subcutaneous collection in the left upper chest, measuring up to 5.4 cm. Consulted general surgery, feel more likely hematoma. No I&D scheduled.    Plan:  -CT chest to assess for change in size of hematoma  -Consider reach out to Surgery pending CT chest, CBC, BMP  - Doxycycline 100 BID for 5 days (finished 8/27)          Constipation  Pt had 2 episodes of diarrhea before admission and No recent sick contacts, Pt had recent hospitalization and abx use  but on chronic opoid use.      Plan  - Lactulose and Senna     Anemia  -Baseline Hgb is around 9  -Macrocytic MCV; most likely 2/2 to Beta Thalassemia   Recent Labs     08/26/24  0703 08/27/24  0942 08/28/24  0621   HGB 9.0* 9.5* 8.9*   HCT 27.1* 30.1* 26.9*     Plan  - Monitor serial CBC: Daily  - Transfuse PRBC if patient becomes hemodynamically unstable, symptomatic or H/H drops below 7/21.    Nausea and vomiting  Likely secondary to SC crisis pain  Pt previously responded well to compazine and promethazine but poorly to zofran    Plan:  -Promethazine 25mg Q6H PRN, and compazine 2.5mg Q6H PRN      History of pulmonary embolism  -Therapeutic dose of Enoxaparin due to hx of clots and PE. Today complained of right leg paresthesias/pain, right leg mildly warmer on exam, brigid sign positive.     Plan:   - US LE showed no DVT  -consider stopping Enoxaparin if hematoma is seen on L chest and causing pain    Hypertension  -resume home meds below    Hypertension Medications               amLODIPine (NORVASC) 10 MG tablet Take 1 tablet (10 mg total) by mouth once daily.    carvediloL (COREG) 25 MG tablet Take 1 tablet (25 mg total) by mouth 2 (two) times daily.                 Anxiety  -continue flouxetine        VTE Risk Mitigation (From admission, onward)           Ordered     enoxaparin injection 90 mg  2 times daily         08/26/24 1117     IP VTE HIGH RISK PATIENT  Once         08/22/24 1326     Place sequential compression device  Until discontinued         08/22/24 1326                    Discharge Planning   ALYSE: 8/30/2024     Code Status: Full Code   Is the patient medically ready for discharge?:     Reason for patient still in hospital (select all that apply): Patient trending condition  Discharge Plan A: Home   Discharge Delays: None known at this time              Lin Torrez MD  Department of Hospital Medicine   [facility] Telemetry

## 2024-08-28 NOTE — PLAN OF CARE
Problem: Adult Inpatient Plan of Care  Goal: Plan of Care Review  Outcome: Progressing  Goal: Optimal Comfort and Wellbeing  Outcome: Progressing     Problem: Bariatric Environmental Safety  Goal: Safety Maintained with Care  Outcome: Progressing     Problem: Acute Kidney Injury/Impairment  Goal: Fluid and Electrolyte Balance  Outcome: Progressing  Goal: Effective Renal Function  Outcome: Progressing     Problem: Wound  Goal: Absence of Infection Signs and Symptoms  Outcome: Progressing  Goal: Skin Health and Integrity  Outcome: Progressing  Goal: Optimal Wound Healing  Outcome: Progressing     Problem: Sickle Cell Disease  Goal: Optimal Coping with Sickle Cell Disease  Outcome: Progressing  Goal: Effective Tissue Perfusion  Outcome: Progressing  Goal: Optimal Pain Control and Function  Outcome: Progressing

## 2024-08-28 NOTE — PLAN OF CARE
CHW provided patient with resources:    Groceries by: [employer]    Supplemental Nutrition Assistance Program (SNAP) by: [facility] ([facility])    Financial Assistance by: [facility]    Food Assistance by: [facility]    Rainbow Box by: [place] [employer]

## 2024-08-28 NOTE — PLAN OF CARE
CHW met with patient/family at bedside. Patient experience rounding completed and reviewed the following.     Do you know your discharge plan? Yes   If yes, what is the plan? (Home)     Have you discussed your needs and preferences with your SW/CM? Yes     If you are discharging home, do you have help at home? Yes   Do you think you will need help additional at home at discharge?  No     Do you currently have difficulty keeping up with bills, affording medicine or buying food? Yes     Assigned SW/CM notified of any patient/family needs or concerns. Appropriate resources provided to address patient's needs.      Patient stated she needs help with rental assistance and utilities.

## 2024-08-28 NOTE — MEDICAL/APP STUDENT
[facility] - Internal Medicine [facility] Medicine  Progress Note    Patient Name: Nazanin Malone  MRN: [id number]  Patient Class: IP- Inpatient   Admission Date: 8/22/2024  Length of Stay: 5 days  Attending Physician: Chase Gasca III*  Primary Care Provider: Pee Montgomery MD        Subjective:     Principal Problem:Sickle cell pain crisis        HPI:  44F with PMHx of HTN, beta thalassemia, sickle cell anemia (hx of acute chest), hx of PE (on Lovenox), Rhabdo, RLE fasciotomy p/w abdominal and chest pain x1 day. Pain is diffuse but most concentrated in the RUQ. Pt endorses associated n/v stating that she can not tolerate any PO and vomited her home pain meds today. She also reports 2x episodes of diarrhea today. Pt has a hx of frequent SC pain crises and was recently hospitalized for one last week. Pt states the current pain feels similar to past SC crises pain. Pt takes morphine 15mg BIG and percocet 10mg Q6H at home. Pt reports not hydrating well recently. She reports subjective fever() for the last month.     Pt was recently discharged with 7 days of lasix which she says she took for 4 days and stopped once her swelling resolved. Pt took full course of Keflex she was d/c'd with too except the last dose which she vomited. On her last admission she states that compazine and promethazine rotation helped her sxs, but zofran was ineffective. She denies any urinary symptoms and has normal UOP. She reports compliance with all her medications at home including Lovenox which she last took this morning. She can walk but not for long distances at [place]. She reports a hx of severe allergies (throat closing) with NSAID use.      Overview/Hospital Course:  Patient was admitted due to vomiting, diarrhea and chest  and abdominal pain. Pain is being controlled with her home medications along with dilaudid PCA pump.  PCA pump cancelled on 8/23. Pain regimen transitioned to IV dilaudid with  breakthrough oxy PRN resulting in improvement in pain control. Pain will be reassessed again daily.      Gen surg consulted for growing and painful area on left chest where port used to be, believe it is more likely a hematoma, will not perform I&D at this time.       Interval History: NAEON. Pt today reports mild improvement in how she feels overall. She states the pain is now 8/10 in severity. Her appetite continues to improve with no new vomiting. She had one bowel movement today. She denies any CP or SOB. Denies  sxs. Normal UOP. No complaints otherwise.       Objective:   [unfilled]Weight: 109.5 kg (241 lb 4.8 oz)  Body mass index is 44.13 kg/m².    Intake/Output Summary (Last 24 hours) at 8/28/2024 1505  Last data filed at 8/28/2024 1337  Gross per 24 hour   Intake 3425.84 ml   Output --   Net 3425.84 ml     Physical Exam  Constitutional:       Appearance: Normal appearance.   HENT:      Head: Normocephalic and atraumatic.      Nose: Nose normal.      Mouth/Throat:      Mouth: Mucous membranes are moist.   Eyes:      Extraocular Movements: Extraocular movements intact.      Conjunctiva/sclera: Conjunctivae normal.   Cardiovascular:      Rate and Rhythm: Normal rate and regular rhythm.   Pulmonary:      Effort: Pulmonary effort is normal.      Breath sounds: Normal breath sounds.   Abdominal:      General: Bowel sounds are normal.      Palpations: Abdomen is soft, nontender.   Musculoskeletal:         General: No swelling. Tender, firm mass in upper left chest. No erythema.  Skin:     General: Skin is warm.   Neurological:      Mental Status: She is alert. Mental status is at baseline.   Psychiatric:         Mood and Affect: Mood normal.         Behavior: Behavior normal.     Significant Labs: All pertinent labs within the past 24 hours have been reviewed.    Significant Imaging: I have reviewed all pertinent imaging results/findings within the past 24 hours.      Assessment/Plan:      44F with PMHx of HTN,  beta thalassemia, sickle cell anemia (hx of acute chest), hx of PE (on Lovenox), Rhabdo, RLE fasciotomy p/w abdominal and chest pain x1 day. Pt endorses associated n/v stating that she can not tolerate any PO. She also reports 2x episodes of diarrhea. Pt has a hx of frequent SC pain crises and was recently hospitalized for one last week.      SCD Vaso-occlusive Crisis  -CXR and Abdominal US negative  -EKG and Troponin negative  -Lactate normal  -No leukocytosis  -Hb 10.5 with baseline of ~9  -Pt chest pain likely 2/2 SC crisis     Plan:  -maintenance IVF (LR 100ml/hr)  -Continue pain regimen (hydromorphone and oxycodone scheduled and PRN )  -Bowel regimen: scheduled miralax/senna  -Avoid NSAIDs due to allergies  -Continue hydroxyurea 1g, pregnancy test  -VTE prophylaxis  -Monitor vitals and symptoms  -CBC Q24H     Hematoma  -Evaluated by GS w/ no intervention recommended  -Pt reports increasing pain at site  -Doxycycline x5 days completed  -CT chest   -F/u imaging     Nausea/Vomiting:  -Likely secondary to SC crisis pain  -Pt previously responded well to compazine and promethazine but poorly to zofran  -promethazine 25mg Q6H PRN, and compazine 2.5mg Q6H PRN  -Monitor sxs     Constipation:  -Pt reports 1 BM today but none for 5 days prior  -Likely secondary to opiate use/pain  -scheduled miralax/senna/lactulose  -Consider enema if worsening  -Monitor BMs     Anemia:  -Hb basline of ~9  -Pt has hx of beta thalassemia  -Anemia is macrocytic  -Transfure if Hb<7 or symptomatic/hemodynamically unstable  -CBC Q24H     Hx of Pulmonary Embolism:  -On home Lovenox  -Start Lovenox 1mg/kg  -Monitor for sxs     RLE pain:  -Chronic, poor pain control recently due to d/c'd gabapentin  -Start gabapentin 400mg BID  -Pt has new onset worsening pain  -USS RLE negative for DVT  -Monitor sxs     Hypertension:  -Chronic, well controlled  -Continue home carvedilol 25mg BID, amlodipine 10mg QD  -Monitor BP, titrate PRN      Anxiety:  -Chronic, well controlled  -Continue home fluoxetine 40mg QD  -Monitor sxs       VTE Risk Mitigation (From admission, onward)           Ordered     enoxaparin injection 90 mg  2 times daily         08/26/24 1117     IP VTE HIGH RISK PATIENT  Once         08/22/24 1326     Place sequential compression device  Until discontinued         08/22/24 1326                  Discharge Planning   ALYSE: 8/30/2024     Code Status: Full Code   Is the patient medically ready for discharge?:     Reason for patient still in hospital (select all that apply): Patient unstable  Discharge Plan A: Home   Discharge Delays: None known at this time    Jeni Hanley  Department of Hospital Medicine   [facility] Telemetry

## 2024-08-28 NOTE — NURSING
Pt refused CT scan at this time and would like it to be done in AM instead. Pt says she is very tired and in pain. Pt says she would like to get some sleep for tonight as she was not able to do so in the past few nights. RT Sudheer informed via secured chat.

## 2024-08-28 NOTE — ASSESSMENT & PLAN NOTE
-Baseline Hgb is around 9  -Macrocytic MCV; most likely 2/2 to Beta Thalassemia   Recent Labs     08/26/24  0703 08/27/24  0942 08/28/24  0621   HGB 9.0* 9.5* 8.9*   HCT 27.1* 30.1* 26.9*     Plan  - Monitor serial CBC: Daily  - Transfuse PRBC if patient becomes hemodynamically unstable, symptomatic or H/H drops below 7/21.

## 2024-08-28 NOTE — NURSING
Pt requesting for scheduled Dilaudid IV 3mg dose to be given earlier (supposedly due at around 2133H) d/t 8-9/10 pain on the left anterior chest wall radiating to the the left neck and left axilla. Pt describes the pain as throbbing and very sore. Noted swelling on the left chest and tenderness to palpation on the left side of the neck, asymmetrical to the right side. Pt says she is amenable to sticking to the schedule as ordered after this early dose(if approved). Pt's request of early Dilaudid IV dose approved by MD Myriam. Dose given approximately 45 minutes earlier to its due time.

## 2024-08-28 NOTE — ASSESSMENT & PLAN NOTE
Pt had 2 episodes of diarrhea before admission and No recent sick contacts, Pt had recent hospitalization and abx use but on chronic opoid use.      Plan  - Lactulose and Senna

## 2024-08-28 NOTE — SUBJECTIVE & OBJECTIVE
Interval History: Patient was well controlled last night. Patient still complains of pain at the left ex-port site. Patient denied any chest pain, dyspnea, or abdominal pain. Chest CT not done yet.     Review of Systems  Objective:     Vital Signs (Most Recent):  Temp: 98.1 °F (36.7 °C) (08/28/24 0801)  Pulse: 76 (08/28/24 0801)  Resp: 17 (08/28/24 0801)  BP: 134/89 (08/28/24 0801)  SpO2: 99 % (08/28/24 0801) Vital Signs (24h Range):  Temp:  [98.1 °F (36.7 °C)-99 °F (37.2 °C)] 98.1 °F (36.7 °C)  Pulse:  [70-83] 76  Resp:  [16-19] 17  SpO2:  [96 %-99 %] 99 %  BP: (113-134)/(67-89) 134/89     Weight: 109.5 kg (241 lb 4.8 oz)  Body mass index is 44.13 kg/m².    Intake/Output Summary (Last 24 hours) at 8/28/2024 0906  Last data filed at 8/28/2024 0552  Gross per 24 hour   Intake 600 ml   Output --   Net 600 ml         Physical Exam  Constitutional:       Appearance: She is not diaphoretic.   HENT:      Head: Normocephalic and atraumatic.   Cardiovascular:      Rate and Rhythm: Normal rate.      Heart sounds: Normal heart sounds.   Pulmonary:      Effort: Pulmonary effort is normal.      Breath sounds: No wheezing or rales.      Comments: Has port on the right chest.  Has undefined collection/mass on where the old left port was; tenderness at site  Abdominal:      General: Abdomen is flat. There is no distension.      Palpations: Abdomen is soft.      Tenderness: There is no abdominal tenderness. There is no right CVA tenderness or left CVA tenderness.   Musculoskeletal:         General: Deformity present. No swelling.      Comments: Right lower leg fasciotomy   Skin:     General: Skin is warm.   Neurological:      Mental Status: She is alert.             Significant Labs: All pertinent labs within the past 24 hours have been reviewed.  CBC:   Recent Labs   Lab 08/27/24  0942 08/28/24  0621   WBC 5.36 6.42   HGB 9.5* 8.9*   HCT 30.1* 26.9*    334     CMP:   Recent Labs   Lab 08/27/24  1310 08/28/24  0621     136   K 4.0 4.3    106   CO2 23 25   GLU 96 73   BUN 20 20   CREATININE 1.4 1.1   CALCIUM 9.3 9.4   PROT  --  6.4   ALBUMIN  --  3.1*   BILITOT  --  0.4   ALKPHOS  --  65   AST  --  21   ALT  --  12   ANIONGAP 6* 5*       Significant Imaging: I have reviewed all pertinent imaging results/findings within the past 24 hours.  Ct chest pending

## 2024-08-28 NOTE — ASSESSMENT & PLAN NOTE
Patient had recently gotten port on left chest removed and admits to pain on site.   Repeat US report shows: Interval increase in size of a complex subcutaneous collection in the left upper chest, measuring up to 5.4 cm. Consulted general surgery, feel more likely hematoma. No I&D scheduled.    Plan:  -CT chest to assess for change in size of hematoma  -Consider reach out to Surgery pending CT chest, CBC, BMP  - Doxycycline 100 BID for 5 days (finished 8/27)

## 2024-08-28 NOTE — PLAN OF CARE
Problem: Adult Inpatient Plan of Care  Goal: Plan of Care Review  Outcome: Progressing  Goal: Patient-Specific Goal (Individualized)  Outcome: Progressing  Goal: Absence of Hospital-Acquired Illness or Injury  Outcome: Progressing  Goal: Optimal Comfort and Wellbeing  Outcome: Progressing  Goal: Readiness for Transition of Care  Outcome: Progressing     Problem: Bariatric Environmental Safety  Goal: Safety Maintained with Care  Outcome: Progressing     Problem: Acute Kidney Injury/Impairment  Goal: Fluid and Electrolyte Balance  Outcome: Progressing  Goal: Improved Oral Intake  Outcome: Progressing  Goal: Effective Renal Function  Outcome: Progressing     Problem: Infection  Goal: Absence of Infection Signs and Symptoms  Outcome: Progressing     Problem: Wound  Goal: Optimal Coping  Outcome: Progressing  Goal: Optimal Functional Ability  Outcome: Progressing  Goal: Absence of Infection Signs and Symptoms  Outcome: Progressing  Goal: Improved Oral Intake  Outcome: Progressing  Goal: Optimal Pain Control and Function  Outcome: Progressing  Goal: Skin Health and Integrity  Outcome: Progressing  Goal: Optimal Wound Healing  Outcome: Progressing     Problem: Fall Injury Risk  Goal: Absence of Fall and Fall-Related Injury  Outcome: Progressing     Problem: Sickle Cell Disease  Goal: Optimal Coping with Sickle Cell Disease  Outcome: Progressing  Goal: Effective Tissue Perfusion  Outcome: Progressing  Goal: Absence of Infection Signs and Symptoms  Outcome: Progressing  Goal: Optimal Pain Control and Function  Outcome: Progressing  Goal: Optimal Oxygenation  Outcome: Progressing

## 2024-08-29 LAB
BASOPHILS # BLD AUTO: 0.04 K/UL (ref 0–0.2)
BASOPHILS NFR BLD: 0.6 % (ref 0–1.9)
DIFFERENTIAL METHOD BLD: ABNORMAL
EOSINOPHIL # BLD AUTO: 0.3 K/UL (ref 0–0.5)
EOSINOPHIL NFR BLD: 3.9 % (ref 0–8)
ERYTHROCYTE [DISTWIDTH] IN BLOOD BY AUTOMATED COUNT: 21.5 % (ref 11.5–14.5)
HCT VFR BLD AUTO: 27.1 % (ref 37–48.5)
HGB BLD-MCNC: 8.8 G/DL (ref 12–16)
IMM GRANULOCYTES # BLD AUTO: 0.02 K/UL (ref 0–0.04)
IMM GRANULOCYTES NFR BLD AUTO: 0.3 % (ref 0–0.5)
LYMPHOCYTES # BLD AUTO: 3.4 K/UL (ref 1–4.8)
LYMPHOCYTES NFR BLD: 49.9 % (ref 18–48)
MCH RBC QN AUTO: 24.6 PG (ref 27–31)
MCHC RBC AUTO-ENTMCNC: 32.5 G/DL (ref 32–36)
MCV RBC AUTO: 76 FL (ref 82–98)
MONOCYTES # BLD AUTO: 0.9 K/UL (ref 0.3–1)
MONOCYTES NFR BLD: 12.3 % (ref 4–15)
NEUTROPHILS # BLD AUTO: 2.3 K/UL (ref 1.8–7.7)
NEUTROPHILS NFR BLD: 33 % (ref 38–73)
NRBC BLD-RTO: 0 /100 WBC
OHS QRS DURATION: 100 MS
OHS QTC CALCULATION: 454 MS
PLATELET # BLD AUTO: 340 K/UL (ref 150–450)
PMV BLD AUTO: 11.6 FL (ref 9.2–12.9)
RBC # BLD AUTO: 3.57 M/UL (ref 4–5.4)
WBC # BLD AUTO: 6.89 K/UL (ref 3.9–12.7)

## 2024-08-29 PROCEDURE — 25000003 PHARM REV CODE 250: Performed by: HOSPITALIST

## 2024-08-29 PROCEDURE — 63600175 PHARM REV CODE 636 W HCPCS

## 2024-08-29 PROCEDURE — 25000003 PHARM REV CODE 250

## 2024-08-29 PROCEDURE — 85025 COMPLETE CBC W/AUTO DIFF WBC: CPT

## 2024-08-29 PROCEDURE — 36415 COLL VENOUS BLD VENIPUNCTURE: CPT

## 2024-08-29 PROCEDURE — 25000003 PHARM REV CODE 250: Performed by: FAMILY MEDICINE

## 2024-08-29 PROCEDURE — 63600175 PHARM REV CODE 636 W HCPCS: Performed by: HOSPITALIST

## 2024-08-29 PROCEDURE — 63600175 PHARM REV CODE 636 W HCPCS: Performed by: FAMILY MEDICINE

## 2024-08-29 PROCEDURE — 11000001 HC ACUTE MED/SURG PRIVATE ROOM

## 2024-08-29 RX ORDER — ADHESIVE BANDAGE
30 BANDAGE TOPICAL DAILY PRN
Status: DISCONTINUED | OUTPATIENT
Start: 2024-08-29 | End: 2024-08-31 | Stop reason: HOSPADM

## 2024-08-29 RX ORDER — OXYCODONE HCL 20 MG/1
20 TABLET, FILM COATED, EXTENDED RELEASE ORAL EVERY 12 HOURS
Qty: 20 EACH | Refills: 0 | Status: SHIPPED | OUTPATIENT
Start: 2024-08-29 | End: 2024-08-31 | Stop reason: HOSPADM

## 2024-08-29 RX ORDER — HYDROMORPHONE HYDROCHLORIDE 2 MG/ML
4 INJECTION, SOLUTION INTRAMUSCULAR; INTRAVENOUS; SUBCUTANEOUS EVERY 4 HOURS PRN
Status: DISCONTINUED | OUTPATIENT
Start: 2024-08-29 | End: 2024-08-30

## 2024-08-29 RX ORDER — OXYCODONE HYDROCHLORIDE 10 MG/1
10 TABLET ORAL EVERY 4 HOURS PRN
Status: DISCONTINUED | OUTPATIENT
Start: 2024-08-29 | End: 2024-08-30

## 2024-08-29 RX ORDER — ENOXAPARIN SODIUM 100 MG/ML
80 INJECTION SUBCUTANEOUS EVERY 12 HOURS
Status: DISCONTINUED | OUTPATIENT
Start: 2024-08-30 | End: 2024-08-31 | Stop reason: HOSPADM

## 2024-08-29 RX ADMIN — SODIUM CHLORIDE, POTASSIUM CHLORIDE, SODIUM LACTATE AND CALCIUM CHLORIDE: 600; 310; 30; 20 INJECTION, SOLUTION INTRAVENOUS at 04:08

## 2024-08-29 RX ADMIN — DIPHENHYDRAMINE HYDROCHLORIDE 25 MG: 50 INJECTION, SOLUTION INTRAMUSCULAR; INTRAVENOUS at 06:08

## 2024-08-29 RX ADMIN — HYDROXYUREA 1000 MG: 500 CAPSULE ORAL at 09:08

## 2024-08-29 RX ADMIN — FLUOXETINE HYDROCHLORIDE 40 MG: 20 CAPSULE ORAL at 09:08

## 2024-08-29 RX ADMIN — GABAPENTIN 400 MG: 300 CAPSULE ORAL at 09:08

## 2024-08-29 RX ADMIN — OXYCODONE HYDROCHLORIDE 10 MG: 10 TABLET ORAL at 01:08

## 2024-08-29 RX ADMIN — OXYCODONE HYDROCHLORIDE 10 MG: 10 TABLET ORAL at 09:08

## 2024-08-29 RX ADMIN — TIZANIDINE 4 MG: 2 TABLET ORAL at 09:08

## 2024-08-29 RX ADMIN — OXYCODONE HYDROCHLORIDE 20 MG: 10 TABLET, FILM COATED, EXTENDED RELEASE ORAL at 09:08

## 2024-08-29 RX ADMIN — TIZANIDINE 4 MG: 2 TABLET ORAL at 10:08

## 2024-08-29 RX ADMIN — HYDROMORPHONE HYDROCHLORIDE 4 MG: 2 INJECTION INTRAMUSCULAR; INTRAVENOUS; SUBCUTANEOUS at 01:08

## 2024-08-29 RX ADMIN — DIPHENHYDRAMINE HYDROCHLORIDE 25 MG: 50 INJECTION, SOLUTION INTRAMUSCULAR; INTRAVENOUS at 11:08

## 2024-08-29 RX ADMIN — OXYCODONE HYDROCHLORIDE 10 MG: 10 TABLET ORAL at 08:08

## 2024-08-29 RX ADMIN — SENNOSIDES AND DOCUSATE SODIUM 2 TABLET: 50; 8.6 TABLET ORAL at 09:08

## 2024-08-29 RX ADMIN — CARVEDILOL 25 MG: 25 TABLET, FILM COATED ORAL at 09:08

## 2024-08-29 RX ADMIN — SODIUM CHLORIDE, POTASSIUM CHLORIDE, SODIUM LACTATE AND CALCIUM CHLORIDE: 600; 310; 30; 20 INJECTION, SOLUTION INTRAVENOUS at 01:08

## 2024-08-29 RX ADMIN — OXYCODONE HYDROCHLORIDE 10 MG: 10 TABLET ORAL at 04:08

## 2024-08-29 RX ADMIN — HYDROMORPHONE HYDROCHLORIDE 4 MG: 2 INJECTION INTRAMUSCULAR; INTRAVENOUS; SUBCUTANEOUS at 06:08

## 2024-08-29 RX ADMIN — SODIUM CHLORIDE, POTASSIUM CHLORIDE, SODIUM LACTATE AND CALCIUM CHLORIDE: 600; 310; 30; 20 INJECTION, SOLUTION INTRAVENOUS at 06:08

## 2024-08-29 RX ADMIN — AMLODIPINE BESYLATE 10 MG: 10 TABLET ORAL at 09:08

## 2024-08-29 RX ADMIN — ENOXAPARIN SODIUM 90 MG: 120 INJECTION SUBCUTANEOUS at 09:08

## 2024-08-29 RX ADMIN — HYDROMORPHONE HYDROCHLORIDE 4 MG: 2 INJECTION INTRAMUSCULAR; INTRAVENOUS; SUBCUTANEOUS at 05:08

## 2024-08-29 RX ADMIN — LACTULOSE 20 G: 20 SOLUTION ORAL at 09:08

## 2024-08-29 RX ADMIN — HYDROMORPHONE HYDROCHLORIDE 4 MG: 2 INJECTION INTRAMUSCULAR; INTRAVENOUS; SUBCUTANEOUS at 09:08

## 2024-08-29 RX ADMIN — HYDROMORPHONE HYDROCHLORIDE 4 MG: 2 INJECTION INTRAMUSCULAR; INTRAVENOUS; SUBCUTANEOUS at 02:08

## 2024-08-29 RX ADMIN — FOLIC ACID 1 MG: 1 TABLET ORAL at 09:08

## 2024-08-29 RX ADMIN — HYDROMORPHONE HYDROCHLORIDE 4 MG: 2 INJECTION INTRAMUSCULAR; INTRAVENOUS; SUBCUTANEOUS at 10:08

## 2024-08-29 RX ADMIN — DIPHENHYDRAMINE HYDROCHLORIDE 25 MG: 50 INJECTION, SOLUTION INTRAMUSCULAR; INTRAVENOUS at 04:08

## 2024-08-29 RX ADMIN — DIPHENHYDRAMINE HYDROCHLORIDE 25 MG: 50 INJECTION, SOLUTION INTRAMUSCULAR; INTRAVENOUS at 02:08

## 2024-08-29 NOTE — ASSESSMENT & PLAN NOTE
44 year old female with history of SC anemia with history of acute chest, beta thalassemia, HTN, obesity, iron deficiency anemia, hx of PE on apixaban, RLE fasciotomy, and recent hospitalization for bacteremia s/p antibiotic course and recent sickle cell pain crisis.     Plan  -Continue titrating pain meds  -Pain control:   - IV Dilaudid PRN and  oxycodone 12 hour tablet 20 mg (plan to switch to  PO)  -Consider higher dose of MS contin on dc   -Acetaminophen 1g PRN   - hydroxyurea 1000mg and titrate PRN  -Stopped LR fluids (8/26)                -consider PO benadryl 25mg PRN for pruritus              -Bowel regimen: lactulose and senna  -Avoid NSAIDs due to allergies

## 2024-08-29 NOTE — SUBJECTIVE & OBJECTIVE
"Interval History:  Patient denies any chest pain, shortness of breath, bowel movements overnight.  Patient asked about milk of magnesia added for bowel regimen.  She also states that she was not able to sleep last night due to the same pain at left ex port site and asked about potentially increasing Dilaudid.    Review of Systems  Objective:     Vital Signs (Most Recent):  Temp: 98.8 °F (37.1 °C) (08/29/24 0805)  Pulse: 80 (08/29/24 0805)  Resp: 18 (08/29/24 0910)  BP: (!) 144/76 (08/29/24 0805)  SpO2: 100 % (08/29/24 0805) Vital Signs (24h Range):  Temp:  [98.3 °F (36.8 °C)-98.8 °F (37.1 °C)] 98.8 °F (37.1 °C)  Pulse:  [70-80] 80  Resp:  [10-18] 18  SpO2:  [96 %-100 %] 100 %  BP: (113-144)/(73-87) 144/76     Weight: 109.5 kg (241 lb 4.8 oz)  Body mass index is 44.13 kg/m².    Intake/Output Summary (Last 24 hours) at 8/29/2024 0921  Last data filed at 8/29/2024 0909  Gross per 24 hour   Intake 3803.62 ml   Output --   Net 3803.62 ml         Physical Exam  Constitutional:       Appearance: She is not diaphoretic.   HENT:      Head: Normocephalic and atraumatic.   Cardiovascular:      Rate and Rhythm: Normal rate.      Heart sounds: Normal heart sounds.   Pulmonary:      Effort: Pulmonary effort is normal.      Breath sounds: No wheezing or rales.      Comments: Has port on the right chest.  Has undefined collection/mass on where the old left port was; tenderness at site  Abdominal:      General: Abdomen is flat. There is no distension.      Palpations: Abdomen is soft.      Tenderness: There is no abdominal tenderness. There is no right CVA tenderness or left CVA tenderness.   Musculoskeletal:         General: Deformity present. No swelling.      Comments: Right lower leg fasciotomy   Skin:     General: Skin is warm.   Neurological:      Mental Status: She is alert.             Significant Labs: All pertinent labs within the past 24 hours have been reviewed.  Blood Culture: No results for input(s): "LABBLOO" in the " last 48 hours.  CBC:   Recent Labs   Lab 08/27/24  0942 08/28/24  0621 08/29/24  0300   WBC 5.36 6.42 6.89   HGB 9.5* 8.9* 8.8*   HCT 30.1* 26.9* 27.1*    334 340     CMP:   Recent Labs   Lab 08/27/24  1310 08/28/24  0621    136   K 4.0 4.3    106   CO2 23 25   GLU 96 73   BUN 20 20   CREATININE 1.4 1.1   CALCIUM 9.3 9.4   PROT  --  6.4   ALBUMIN  --  3.1*   BILITOT  --  0.4   ALKPHOS  --  65   AST  --  21   ALT  --  12   ANIONGAP 6* 5*       Significant Imaging: I have reviewed all pertinent imaging results/findings within the past 24 hours.  CT chest and US resulted.

## 2024-08-29 NOTE — ASSESSMENT & PLAN NOTE
-Baseline Hgb is around 9  -Macrocytic MCV; most likely 2/2 to Beta Thalassemia   Recent Labs     08/27/24  0942 08/28/24  0621 08/29/24  0300   HGB 9.5* 8.9* 8.8*   HCT 30.1* 26.9* 27.1*     Plan  - Monitor serial CBC: Daily  - Transfuse PRBC if patient becomes hemodynamically unstable, symptomatic or H/H drops below 7/21.

## 2024-08-29 NOTE — ASSESSMENT & PLAN NOTE
Patient had recently gotten port on left chest removed and admits to pain on site.   Repeat US report shows: Interval increase in size of a complex subcutaneous collection in the left upper chest, measuring up to 5.4 cm. Consulted general surgery, feel more likely hematoma. No I&D scheduled.    Plan:  -8/28/24 CT chest impression: left superior chest wall/subcutaneous masslike soft tissue density which may represent hematoma post removal of infusion port.   - Doxycycline 100 BID for 5 days (finished 8/27)

## 2024-08-29 NOTE — PROGRESS NOTES
[facility]  Progress Note    Patient Name: Nazanin Malone  MRN: [id number]  Patient Class: IP- Inpatient   Admission Date: 8/22/2024  Length of Stay: 6 days  Attending Physician: Paola Mckeon*  Primary Care Provider: Pee Montgomery MD        Subjective:     Principal Problem:Sickle cell pain crisis        HPI:  45yo F with hx of sickle cell beta thalassemia disease, HTN, asthma, depression, PE comes in to ED with chest pain and abdominal pain. Patient also mentions associated diarrhea, bilious vomiting, productive cough with clear sputum, decreased appetite, and slight dyspnea at rest. Patient was recently discharged for a sickle cell crisis, PE, UTI and feels like this sickle cell again. She is adherent to the Lovenox and had almost finished course of Keflex except for the last dose which she threw up.     Patient states the abdominal pain is diffuse but worse on the RUQ and with palpation. Patient has some diffuse chest pain that is worsened with palpation around especially around the port sites. Patient states all this symptoms she is experiencing started yesterday. Patient currently takes MS contin BID and Percocet Q6H for pain and promethazine for nausea. She was give lasix from her last admission due to to upper extremity edema and currently does not take hydroxyurea but expresses wishes to take it. Patient denies any dysuria and hematuria or any recent stressors that could have caused this latest flare of sickle cell.     In the ED, CBC, CMP, CXR and RUQ US was ordered and Dilaudid and Morphine were given for pain control.      Overview/Hospital Course:  Patient was admitted due to vomiting, diarrhea and chest  and abdominal pain. Pain is being controlled with her home medications along with dilaudid PCA pump.  PCA pump cancelled on 8/23. Pain regimen transitioned to IV dilaudid with breakthrough oxy PRN resulting in improvement in pain  control. Pain will be reassessed again daily.      Gen surg consulted for growing and painful area on left chest where port used to be, believe it is more likely a hematoma, will not perform I&D at this time. LE US ordered for pain showed no DVT. Upper extremity US showed thrombosis in left IJV which is currently being treated with the therapeutic dose of Lovenox the patient is on. Patient is on Lovenox for her hx of PE. CT chest showed a mass likely to be hematoma which might be collecting and pushing on left ex port site causing pain. In addition, will try to ween patient off high dose/IV pain meds and transition to PO.     Interval History:  Patient denies any chest pain, shortness of breath, bowel movements overnight.  Patient asked about milk of magnesia added for bowel regimen.  She also states that she was not able to sleep last night due to the same pain at left ex port site and asked about potentially increasing Dilaudid.    Review of Systems  Objective:     Vital Signs (Most Recent):  Temp: 98.8 °F (37.1 °C) (08/29/24 0805)  Pulse: 80 (08/29/24 0805)  Resp: 18 (08/29/24 0910)  BP: (!) 144/76 (08/29/24 0805)  SpO2: 100 % (08/29/24 0805) Vital Signs (24h Range):  Temp:  [98.3 °F (36.8 °C)-98.8 °F (37.1 °C)] 98.8 °F (37.1 °C)  Pulse:  [70-80] 80  Resp:  [10-18] 18  SpO2:  [96 %-100 %] 100 %  BP: (113-144)/(73-87) 144/76     Weight: 109.5 kg (241 lb 4.8 oz)  Body mass index is 44.13 kg/m².    Intake/Output Summary (Last 24 hours) at 8/29/2024 0921  Last data filed at 8/29/2024 0909  Gross per 24 hour   Intake 3803.62 ml   Output --   Net 3803.62 ml         Physical Exam  Constitutional:       Appearance: She is not diaphoretic.   HENT:      Head: Normocephalic and atraumatic.   Cardiovascular:      Rate and Rhythm: Normal rate.      Heart sounds: Normal heart sounds.   Pulmonary:      Effort: Pulmonary effort is normal.      Breath sounds: No wheezing or rales.      Comments: Has port on the right chest.  Has  "undefined collection/mass on where the old left port was; tenderness at site  Abdominal:      General: Abdomen is flat. There is no distension.      Palpations: Abdomen is soft.      Tenderness: There is no abdominal tenderness. There is no right CVA tenderness or left CVA tenderness.   Musculoskeletal:         General: Deformity present. No swelling.      Comments: Right lower leg fasciotomy   Skin:     General: Skin is warm.   Neurological:      Mental Status: She is alert.             Significant Labs: All pertinent labs within the past 24 hours have been reviewed.  Blood Culture: No results for input(s): "LABBLOO" in the last 48 hours.  CBC:   Recent Labs   Lab 08/27/24  0942 08/28/24  0621 08/29/24  0300   WBC 5.36 6.42 6.89   HGB 9.5* 8.9* 8.8*   HCT 30.1* 26.9* 27.1*    334 340     CMP:   Recent Labs   Lab 08/27/24  1310 08/28/24  0621    136   K 4.0 4.3    106   CO2 23 25   GLU 96 73   BUN 20 20   CREATININE 1.4 1.1   CALCIUM 9.3 9.4   PROT  --  6.4   ALBUMIN  --  3.1*   BILITOT  --  0.4   ALKPHOS  --  65   AST  --  21   ALT  --  12   ANIONGAP 6* 5*       Significant Imaging: I have reviewed all pertinent imaging results/findings within the past 24 hours.  CT chest and US resulted.    Assessment/Plan:      * Sickle cell pain crisis  44 year old female with history of SC anemia with history of acute chest, beta thalassemia, HTN, obesity, iron deficiency anemia, hx of PE on apixaban, RLE fasciotomy, and recent hospitalization for bacteremia s/p antibiotic course and recent sickle cell pain crisis.     Plan  -Continue titrating pain meds  -Pain control:   - IV Dilaudid PRN and  oxycodone 12 hour tablet 20 mg (plan to switch to  PO)  -Consider higher dose of MS contin on dc   -Acetaminophen 1g PRN   - hydroxyurea 1000mg and titrate PRN  -Stopped LR fluids (8/26)                -consider PO benadryl 25mg PRN for pruritus              -Bowel regimen: lactulose and senna  -Avoid NSAIDs due to " allergies          Fluid collection at surgical site  Patient had recently gotten port on left chest removed and admits to pain on site.   Repeat US report shows: Interval increase in size of a complex subcutaneous collection in the left upper chest, measuring up to 5.4 cm. Consulted general surgery, feel more likely hematoma. No I&D scheduled.    Plan:  -8/28/24 CT chest impression: left superior chest wall/subcutaneous masslike soft tissue density which may represent hematoma post removal of infusion port.   - Doxycycline 100 BID for 5 days (finished 8/27)          Constipation  Pt had 2 episodes of diarrhea before admission and No recent sick contacts, Pt had recent hospitalization and abx use but on chronic opoid use.      Plan  - Lactulose and Senna     History of pulmonary embolism  -Therapeutic dose of Enoxaparin due to hx of clots and PE. Today complained of right leg paresthesias/pain, right leg mildly warmer on exam, brigid sign positive.     Plan:   - US LE showed no DVT  -consider stopping Enoxaparin if hematoma is seen on L chest and causing pain    Anemia  -Baseline Hgb is around 9  -Macrocytic MCV; most likely 2/2 to Beta Thalassemia   Recent Labs     08/27/24  0942 08/28/24  0621 08/29/24  0300   HGB 9.5* 8.9* 8.8*   HCT 30.1* 26.9* 27.1*     Plan  - Monitor serial CBC: Daily  - Transfuse PRBC if patient becomes hemodynamically unstable, symptomatic or H/H drops below 7/21.    Nausea and vomiting  Likely secondary to SC crisis pain  Pt previously responded well to compazine and promethazine but poorly to zofran    Plan:  -Promethazine 25mg Q6H PRN, and compazine 2.5mg Q6H PRN      Hypertension  -resume home meds below    Hypertension Medications               amLODIPine (NORVASC) 10 MG tablet Take 1 tablet (10 mg total) by mouth once daily.    carvediloL (COREG) 25 MG tablet Take 1 tablet (25 mg total) by mouth 2 (two) times daily.                 Anxiety  -continue flouxetine        VTE Risk  Mitigation (From admission, onward)           Ordered     enoxaparin injection 80 mg  Every 12 hours         08/29/24 1414     IP VTE HIGH RISK PATIENT  Once         08/22/24 1326     Place sequential compression device  Until discontinued         08/22/24 1326                    Discharge Planning   ALYSE: 8/31/2024     Code Status: Full Code   Is the patient medically ready for discharge?:     Reason for patient still in hospital (select all that apply): Patient trending condition  Discharge Plan A: Home   Discharge Delays: None known at this time              Lin Torrez MD  Department of Hospital Medicine   [facility] - Internal Medicine Telemetry

## 2024-08-29 NOTE — PLAN OF CARE
Problem: Adult Inpatient Plan of Care  Goal: Plan of Care Review  Outcome: Progressing  Goal: Patient-Specific Goal (Individualized)  Outcome: Progressing  Goal: Absence of Hospital-Acquired Illness or Injury  Outcome: Progressing  Goal: Optimal Comfort and Wellbeing  Outcome: Progressing  Goal: Readiness for Transition of Care  Outcome: Progressing     Problem: Bariatric Environmental Safety  Goal: Safety Maintained with Care  Outcome: Progressing     Problem: Wound  Goal: Optimal Coping  Outcome: Progressing  Goal: Optimal Functional Ability  Outcome: Progressing  Goal: Absence of Infection Signs and Symptoms  Outcome: Progressing  Goal: Improved Oral Intake  Outcome: Progressing  Goal: Optimal Pain Control and Function  Outcome: Progressing  Goal: Skin Health and Integrity  Outcome: Progressing  Goal: Optimal Wound Healing  Outcome: Progressing     Problem: Fall Injury Risk  Goal: Absence of Fall and Fall-Related Injury  Outcome: Progressing     Problem: Sickle Cell Disease  Goal: Optimal Coping with Sickle Cell Disease  Outcome: Progressing  Goal: Effective Tissue Perfusion  Outcome: Progressing  Goal: Absence of Infection Signs and Symptoms  Outcome: Progressing  Goal: Optimal Pain Control and Function  Outcome: Progressing  Goal: Optimal Oxygenation  Outcome: Progressing      Use Enhanced Medication Counseling?: No

## 2024-08-29 NOTE — NURSING
Offered to wrap old LUE where previous port was per Hospital medicine recs to help with pain. Patient denied intervention, requested heat packs instead. Provided to patient.

## 2024-08-30 LAB
ALBUMIN SERPL BCP-MCNC: 3.4 G/DL (ref 3.5–5.2)
ALP SERPL-CCNC: 66 U/L (ref 55–135)
ALT SERPL W/O P-5'-P-CCNC: 12 U/L (ref 10–44)
ANION GAP SERPL CALC-SCNC: 11 MMOL/L (ref 8–16)
AST SERPL-CCNC: 22 U/L (ref 10–40)
BASOPHILS # BLD AUTO: 0.04 K/UL (ref 0–0.2)
BASOPHILS NFR BLD: 0.6 % (ref 0–1.9)
BILIRUB SERPL-MCNC: 0.4 MG/DL (ref 0.1–1)
BUN SERPL-MCNC: 19 MG/DL (ref 6–20)
CALCIUM SERPL-MCNC: 9.6 MG/DL (ref 8.7–10.5)
CHLORIDE SERPL-SCNC: 107 MMOL/L (ref 95–110)
CO2 SERPL-SCNC: 21 MMOL/L (ref 23–29)
CREAT SERPL-MCNC: 1.1 MG/DL (ref 0.5–1.4)
DIFFERENTIAL METHOD BLD: ABNORMAL
EOSINOPHIL # BLD AUTO: 0.3 K/UL (ref 0–0.5)
EOSINOPHIL NFR BLD: 4.1 % (ref 0–8)
ERYTHROCYTE [DISTWIDTH] IN BLOOD BY AUTOMATED COUNT: 21.4 % (ref 11.5–14.5)
EST. GFR  (NO RACE VARIABLE): >60 ML/MIN/1.73 M^2
GLUCOSE SERPL-MCNC: 45 MG/DL (ref 70–110)
HCT VFR BLD AUTO: 31.9 % (ref 37–48.5)
HGB BLD-MCNC: 10.1 G/DL (ref 12–16)
IMM GRANULOCYTES # BLD AUTO: 0.02 K/UL (ref 0–0.04)
IMM GRANULOCYTES NFR BLD AUTO: 0.3 % (ref 0–0.5)
LYMPHOCYTES # BLD AUTO: 3.3 K/UL (ref 1–4.8)
LYMPHOCYTES NFR BLD: 49.6 % (ref 18–48)
MCH RBC QN AUTO: 25.6 PG (ref 27–31)
MCHC RBC AUTO-ENTMCNC: 31.7 G/DL (ref 32–36)
MCV RBC AUTO: 81 FL (ref 82–98)
MONOCYTES # BLD AUTO: 0.9 K/UL (ref 0.3–1)
MONOCYTES NFR BLD: 13.6 % (ref 4–15)
NEUTROPHILS # BLD AUTO: 2.1 K/UL (ref 1.8–7.7)
NEUTROPHILS NFR BLD: 31.8 % (ref 38–73)
NRBC BLD-RTO: 1 /100 WBC
PLATELET # BLD AUTO: 302 K/UL (ref 150–450)
PMV BLD AUTO: 9.9 FL (ref 9.2–12.9)
POCT GLUCOSE: 90 MG/DL (ref 70–110)
POTASSIUM SERPL-SCNC: 4.7 MMOL/L (ref 3.5–5.1)
PROT SERPL-MCNC: 6.8 G/DL (ref 6–8.4)
RBC # BLD AUTO: 3.95 M/UL (ref 4–5.4)
SODIUM SERPL-SCNC: 139 MMOL/L (ref 136–145)
WBC # BLD AUTO: 6.55 K/UL (ref 3.9–12.7)

## 2024-08-30 PROCEDURE — 63600175 PHARM REV CODE 636 W HCPCS: Performed by: HOSPITALIST

## 2024-08-30 PROCEDURE — 80053 COMPREHEN METABOLIC PANEL: CPT

## 2024-08-30 PROCEDURE — 11000001 HC ACUTE MED/SURG PRIVATE ROOM

## 2024-08-30 PROCEDURE — 36415 COLL VENOUS BLD VENIPUNCTURE: CPT

## 2024-08-30 PROCEDURE — 25000003 PHARM REV CODE 250: Performed by: HOSPITALIST

## 2024-08-30 PROCEDURE — 25000003 PHARM REV CODE 250

## 2024-08-30 PROCEDURE — 85025 COMPLETE CBC W/AUTO DIFF WBC: CPT

## 2024-08-30 PROCEDURE — 63600175 PHARM REV CODE 636 W HCPCS

## 2024-08-30 RX ORDER — MORPHINE SULFATE 30 MG/1
30 TABLET, FILM COATED, EXTENDED RELEASE ORAL EVERY 12 HOURS
Status: DISCONTINUED | OUTPATIENT
Start: 2024-08-30 | End: 2024-08-31 | Stop reason: HOSPADM

## 2024-08-30 RX ORDER — HYDROMORPHONE HYDROCHLORIDE 2 MG/ML
2 INJECTION, SOLUTION INTRAMUSCULAR; INTRAVENOUS; SUBCUTANEOUS EVERY 4 HOURS PRN
Status: DISCONTINUED | OUTPATIENT
Start: 2024-08-30 | End: 2024-08-31 | Stop reason: HOSPADM

## 2024-08-30 RX ORDER — AMOXICILLIN 250 MG
2 CAPSULE ORAL DAILY PRN
Status: DISCONTINUED | OUTPATIENT
Start: 2024-08-30 | End: 2024-08-31 | Stop reason: HOSPADM

## 2024-08-30 RX ORDER — LACTULOSE 10 G/15ML
20 SOLUTION ORAL DAILY PRN
Status: DISCONTINUED | OUTPATIENT
Start: 2024-08-30 | End: 2024-08-31 | Stop reason: HOSPADM

## 2024-08-30 RX ADMIN — SODIUM CHLORIDE, POTASSIUM CHLORIDE, SODIUM LACTATE AND CALCIUM CHLORIDE: 600; 310; 30; 20 INJECTION, SOLUTION INTRAVENOUS at 07:08

## 2024-08-30 RX ADMIN — FLUOXETINE HYDROCHLORIDE 40 MG: 20 CAPSULE ORAL at 09:08

## 2024-08-30 RX ADMIN — SENNOSIDES AND DOCUSATE SODIUM 2 TABLET: 50; 8.6 TABLET ORAL at 09:08

## 2024-08-30 RX ADMIN — CARVEDILOL 25 MG: 25 TABLET, FILM COATED ORAL at 09:08

## 2024-08-30 RX ADMIN — GABAPENTIN 400 MG: 300 CAPSULE ORAL at 09:08

## 2024-08-30 RX ADMIN — PROMETHAZINE HYDROCHLORIDE 25 MG: 12.5 TABLET ORAL at 10:08

## 2024-08-30 RX ADMIN — DIPHENHYDRAMINE HYDROCHLORIDE 25 MG: 50 INJECTION, SOLUTION INTRAMUSCULAR; INTRAVENOUS at 09:08

## 2024-08-30 RX ADMIN — FOLIC ACID 1 MG: 1 TABLET ORAL at 09:08

## 2024-08-30 RX ADMIN — GABAPENTIN 400 MG: 300 CAPSULE ORAL at 08:08

## 2024-08-30 RX ADMIN — MORPHINE SULFATE 30 MG: 30 TABLET, FILM COATED, EXTENDED RELEASE ORAL at 09:08

## 2024-08-30 RX ADMIN — OXYCODONE HYDROCHLORIDE 10 MG: 10 TABLET ORAL at 06:08

## 2024-08-30 RX ADMIN — OXYCODONE 15 MG: 5 TABLET ORAL at 05:08

## 2024-08-30 RX ADMIN — AMLODIPINE BESYLATE 10 MG: 10 TABLET ORAL at 09:08

## 2024-08-30 RX ADMIN — HYDROMORPHONE HYDROCHLORIDE 2 MG: 2 INJECTION INTRAMUSCULAR; INTRAVENOUS; SUBCUTANEOUS at 03:08

## 2024-08-30 RX ADMIN — TIZANIDINE 4 MG: 2 TABLET ORAL at 12:08

## 2024-08-30 RX ADMIN — Medication 6 MG: at 08:08

## 2024-08-30 RX ADMIN — ENOXAPARIN SODIUM 80 MG: 80 INJECTION SUBCUTANEOUS at 08:08

## 2024-08-30 RX ADMIN — OXYCODONE 15 MG: 5 TABLET ORAL at 10:08

## 2024-08-30 RX ADMIN — PROMETHAZINE HYDROCHLORIDE 25 MG: 12.5 TABLET ORAL at 03:08

## 2024-08-30 RX ADMIN — LACTULOSE 20 G: 20 SOLUTION ORAL at 09:08

## 2024-08-30 RX ADMIN — PROCHLORPERAZINE EDISYLATE 2.5 MG: 5 INJECTION INTRAMUSCULAR; INTRAVENOUS at 12:08

## 2024-08-30 RX ADMIN — DIPHENHYDRAMINE HYDROCHLORIDE 25 MG: 50 INJECTION, SOLUTION INTRAMUSCULAR; INTRAVENOUS at 08:08

## 2024-08-30 RX ADMIN — MORPHINE SULFATE 30 MG: 30 TABLET, FILM COATED, EXTENDED RELEASE ORAL at 08:08

## 2024-08-30 RX ADMIN — SODIUM CHLORIDE, POTASSIUM CHLORIDE, SODIUM LACTATE AND CALCIUM CHLORIDE: 600; 310; 30; 20 INJECTION, SOLUTION INTRAVENOUS at 05:08

## 2024-08-30 RX ADMIN — CARVEDILOL 25 MG: 25 TABLET, FILM COATED ORAL at 08:08

## 2024-08-30 RX ADMIN — HYDROXYUREA 1000 MG: 500 CAPSULE ORAL at 09:08

## 2024-08-30 RX ADMIN — HYDROMORPHONE HYDROCHLORIDE 4 MG: 2 INJECTION INTRAMUSCULAR; INTRAVENOUS; SUBCUTANEOUS at 03:08

## 2024-08-30 RX ADMIN — HYDROMORPHONE HYDROCHLORIDE 2 MG: 2 INJECTION INTRAMUSCULAR; INTRAVENOUS; SUBCUTANEOUS at 10:08

## 2024-08-30 RX ADMIN — OXYCODONE 15 MG: 5 TABLET ORAL at 12:08

## 2024-08-30 RX ADMIN — OXYCODONE HYDROCHLORIDE 10 MG: 10 TABLET ORAL at 02:08

## 2024-08-30 RX ADMIN — HYDROMORPHONE HYDROCHLORIDE 4 MG: 2 INJECTION INTRAMUSCULAR; INTRAVENOUS; SUBCUTANEOUS at 07:08

## 2024-08-30 RX ADMIN — TIZANIDINE 4 MG: 2 TABLET ORAL at 10:08

## 2024-08-30 RX ADMIN — ENOXAPARIN SODIUM 80 MG: 80 INJECTION SUBCUTANEOUS at 09:08

## 2024-08-30 RX ADMIN — HYDROMORPHONE HYDROCHLORIDE 2 MG: 2 INJECTION INTRAMUSCULAR; INTRAVENOUS; SUBCUTANEOUS at 08:08

## 2024-08-30 NOTE — PLAN OF CARE
Problem: Adult Inpatient Plan of Care  Goal: Plan of Care Review  Outcome: Progressing  Goal: Patient-Specific Goal (Individualized)  Outcome: Progressing  Goal: Absence of Hospital-Acquired Illness or Injury  Outcome: Progressing  Goal: Optimal Comfort and Wellbeing  Outcome: Progressing  Goal: Readiness for Transition of Care  Outcome: Progressing     Problem: Wound  Goal: Optimal Coping  Outcome: Progressing  Goal: Optimal Functional Ability  Outcome: Progressing  Goal: Absence of Infection Signs and Symptoms  Outcome: Progressing  Goal: Improved Oral Intake  Outcome: Progressing  Goal: Optimal Pain Control and Function  Outcome: Progressing  Goal: Skin Health and Integrity  Outcome: Progressing  Goal: Optimal Wound Healing  Outcome: Progressing     Problem: Fall Injury Risk  Goal: Absence of Fall and Fall-Related Injury  Outcome: Progressing     Problem: Sickle Cell Disease  Goal: Optimal Coping with Sickle Cell Disease  Outcome: Progressing  Goal: Effective Tissue Perfusion  Outcome: Progressing  Goal: Absence of Infection Signs and Symptoms  Outcome: Progressing  Goal: Optimal Pain Control and Function  Outcome: Progressing  Goal: Optimal Oxygenation  Outcome: Progressing      [No Acute Distress] : no acute distress [Well Nourished] : well nourished [Well Developed] : well developed [Well-Appearing] : well-appearing [Normal Sclera/Conjunctiva] : normal sclera/conjunctiva [PERRL] : pupils equal round and reactive to light [EOMI] : extraocular movements intact [Normal Outer Ear/Nose] : the outer ears and nose were normal in appearance [Normal Oropharynx] : the oropharynx was normal [No JVD] : no jugular venous distention [No Lymphadenopathy] : no lymphadenopathy [Supple] : supple [Thyroid Normal, No Nodules] : the thyroid was normal and there were no nodules present [No Respiratory Distress] : no respiratory distress  [No Accessory Muscle Use] : no accessory muscle use [Clear to Auscultation] : lungs were clear to auscultation bilaterally [Normal Rate] : normal rate  [Regular Rhythm] : with a regular rhythm [Normal S1, S2] : normal S1 and S2 [No Murmur] : no murmur heard [No Carotid Bruits] : no carotid bruits [No Abdominal Bruit] : a ~M bruit was not heard ~T in the abdomen [No Varicosities] : no varicosities [Pedal Pulses Present] : the pedal pulses are present [No Edema] : there was no peripheral edema [No Palpable Aorta] : no palpable aorta [No Extremity Clubbing/Cyanosis] : no extremity clubbing/cyanosis [Soft] : abdomen soft [Non Tender] : non-tender [Non-distended] : non-distended [No Masses] : no abdominal mass palpated [No HSM] : no HSM [Normal Bowel Sounds] : normal bowel sounds [Normal Posterior Cervical Nodes] : no posterior cervical lymphadenopathy [Normal Anterior Cervical Nodes] : no anterior cervical lymphadenopathy [No CVA Tenderness] : no CVA  tenderness [No Spinal Tenderness] : no spinal tenderness [No Joint Swelling] : no joint swelling [Grossly Normal Strength/Tone] : grossly normal strength/tone [No Rash] : no rash [Coordination Grossly Intact] : coordination grossly intact [No Focal Deficits] : no focal deficits [Normal Gait] : normal gait [Deep Tendon Reflexes (DTR)] : deep tendon reflexes were 2+ and symmetric [Normal Affect] : the affect was normal [Normal Insight/Judgement] : insight and judgment were intact

## 2024-08-30 NOTE — ASSESSMENT & PLAN NOTE
Pt had 2 episodes of diarrhea before admission and no recent sick contacts. Pt had recent hospitalization and abx use but on chronic opoid use.     - Lactulose and Senna prn  - Pt requested Milk of Magnesia prn

## 2024-08-30 NOTE — SUBJECTIVE & OBJECTIVE
Interval History: SANCHO, VSS, pt denies any f/c/n/v/sob. Pt reports relief of constipation overnight. Pt reports no change to L side chest hematoma or overall pain. Pt is agreeable to plan for transition to PO pain medication and discharge.    Review of Systems   Constitutional: Negative.    HENT: Negative.     Eyes: Negative.    Respiratory: Negative.     Cardiovascular: Negative.    Gastrointestinal: Negative.    Endocrine: Negative.    Genitourinary: Negative.    Musculoskeletal: Negative.    Skin: Negative.    Allergic/Immunologic: Negative.    Neurological: Negative.    Hematological: Negative.    Psychiatric/Behavioral: Negative.       Objective:     Vital Signs (Most Recent):  Temp: 98.7 °F (37.1 °C) (08/30/24 1115)  Pulse: 72 (08/30/24 1115)  Resp: 20 (08/30/24 1237)  BP: 124/71 (08/30/24 1115)  SpO2: 99 % (08/30/24 1115) Vital Signs (24h Range):  Temp:  [98.4 °F (36.9 °C)-99 °F (37.2 °C)] 98.7 °F (37.1 °C)  Pulse:  [72-82] 72  Resp:  [16-20] 20  SpO2:  [97 %-99 %] 99 %  BP: (120-135)/(71-83) 124/71     Weight: 109.5 kg (241 lb 6.5 oz)  Body mass index is 44.15 kg/m².    Intake/Output Summary (Last 24 hours) at 8/30/2024 1539  Last data filed at 8/29/2024 2122  Gross per 24 hour   Intake 785 ml   Output --   Net 785 ml         Physical Exam  Constitutional:       General: She is not in acute distress.  Cardiovascular:      Heart sounds: No murmur heard.  Pulmonary:      Effort: No respiratory distress.   Abdominal:      Tenderness: There is no abdominal tenderness.   Musculoskeletal:         General: Tenderness present.      Comments: Port at R side chest  Hematoma at L side chest is tender, unchanged from previous   Neurological:      Mental Status: She is alert and oriented to person, place, and time. Mental status is at baseline.   Psychiatric:         Mood and Affect: Mood normal.         Behavior: Behavior normal.             Significant Labs: All pertinent labs within the past 24 hours have been  reviewed.    Significant Imaging: I have reviewed all pertinent imaging results/findings within the past 24 hours.

## 2024-08-30 NOTE — PROGRESS NOTES
[facility] - Internal Medicine [facility] Medicine  Progress Note    Patient Name: Nazanin Malone  MRN: [id number]  Patient Class: IP- Inpatient   Admission Date: 8/22/2024  Length of Stay: 7 days  Attending Physician: Paola Mckeon*  Primary Care Provider: Pee Montgomery MD        Subjective:     Principal Problem:Sickle cell pain crisis        HPI:  43yo F with hx of sickle cell beta thalassemia disease, HTN, asthma, depression, PE comes in to ED with chest pain and abdominal pain. Patient also mentions associated diarrhea, bilious vomiting, productive cough with clear sputum, decreased appetite, and slight dyspnea at rest. Patient was recently discharged for a sickle cell crisis, PE, UTI and feels like this sickle cell again. She is adherent to the Lovenox and had almost finished course of Keflex except for the last dose which she threw up.     Patient states the abdominal pain is diffuse but worse on the RUQ and with palpation. Patient has some diffuse chest pain that is worsened with palpation around especially around the port sites. Patient states all this symptoms she is experiencing started yesterday. Patient currently takes MS contin BID and Percocet Q6H for pain and promethazine for nausea. She was give lasix from her last admission due to to upper extremity edema and currently does not take hydroxyurea but expresses wishes to take it. Patient denies any dysuria and hematuria or any recent stressors that could have caused this latest flare of sickle cell.     In the ED, CBC, CMP, CXR and RUQ US was ordered and Dilaudid and Morphine were given for pain control.      Overview/Hospital Course:  Patient was admitted due to vomiting, diarrhea and chest  and abdominal pain. Pain is being controlled with her home medications along with dilaudid PCA pump.  PCA pump cancelled on 8/23. Pain regimen transitioned to IV dilaudid with breakthrough oxy PRN resulting in improvement in pain  control. Pain will be reassessed again daily.      Gen surg consulted for growing and painful area on left chest where port used to be, believe it is more likely a hematoma, will not perform I&D at this time. LE US ordered for pain showed no DVT. Upper extremity US showed thrombosis in left IJV which is currently being treated with the therapeutic dose of Lovenox the patient is on. Patient is on Lovenox for her hx of PE. CT chest showed a mass likely to be hematoma which might be collecting and pushing on left ex port site causing pain. In addition, will try to ween patient off high dose/IV pain meds and transition to PO.     Interval History: NAEON, VSS, pt denies any f/c/n/v/sob. Pt reports relief of constipation overnight. Pt reports no change to L side chest hematoma or overall pain. Pt is agreeable to plan for transition to PO pain medication and discharge.    Review of Systems   Constitutional: Negative.    HENT: Negative.     Eyes: Negative.    Respiratory: Negative.     Cardiovascular: Negative.    Gastrointestinal: Negative.    Endocrine: Negative.    Genitourinary: Negative.    Musculoskeletal: Negative.    Skin: Negative.    Allergic/Immunologic: Negative.    Neurological: Negative.    Hematological: Negative.    Psychiatric/Behavioral: Negative.       Objective:     Vital Signs (Most Recent):  Temp: 98.7 °F (37.1 °C) (08/30/24 1115)  Pulse: 72 (08/30/24 1115)  Resp: 20 (08/30/24 1237)  BP: 124/71 (08/30/24 1115)  SpO2: 99 % (08/30/24 1115) Vital Signs (24h Range):  Temp:  [98.4 °F (36.9 °C)-99 °F (37.2 °C)] 98.7 °F (37.1 °C)  Pulse:  [72-82] 72  Resp:  [16-20] 20  SpO2:  [97 %-99 %] 99 %  BP: (120-135)/(71-83) 124/71     Weight: 109.5 kg (241 lb 6.5 oz)  Body mass index is 44.15 kg/m².    Intake/Output Summary (Last 24 hours) at 8/30/2024 2984  Last data filed at 8/29/2024 2122  Gross per 24 hour   Intake 785 ml   Output --   Net 785 ml         Physical Exam  Constitutional:       General: She is not in  acute distress.  Cardiovascular:      Heart sounds: No murmur heard.  Pulmonary:      Effort: No respiratory distress.   Abdominal:      Tenderness: There is no abdominal tenderness.   Musculoskeletal:         General: Tenderness present.      Comments: Port at R side chest  Hematoma at L side chest is tender, unchanged from previous   Neurological:      Mental Status: She is alert and oriented to person, place, and time. Mental status is at baseline.   Psychiatric:         Mood and Affect: Mood normal.         Behavior: Behavior normal.             Significant Labs: All pertinent labs within the past 24 hours have been reviewed.    Significant Imaging: I have reviewed all pertinent imaging results/findings within the past 24 hours.    Assessment/Plan:      * Sickle cell pain crisis  44 year old female with history of SC anemia with history of acute chest, beta thalassemia, HTN, obesity, iron deficiency anemia, hx of PE on apixaban, RLE fasciotomy, and recent hospitalization for bacteremia s/p antibiotic course and recent sickle cell pain crisis.     - Continue titrating pain meds  - Pain control:   - continue scheduled Morphine (12 BID), Gabapentin (400 BID), Hydroxyurea (1,000 QD),  - continue prn Oxycodone (15), IV Dilaudid (2) for breakthrough pain   - pt instructed to avoid use of IV prn as much as possible  - consider higher dose of MS contin on dc    Nausea and vomiting  Likely secondary to SC crisis pain  Pt previously responded well to compazine and promethazine but poorly to zofran    - Promethazine and Compazine prn      Fluid collection at surgical site  Patient had recently gotten port on left chest removed and admits to pain on site. Repeat US report shows: Interval increase in size of a complex subcutaneous collection in the left upper chest, measuring up to 5.4 cm. Consulted general surgery, feel more likely hematoma. No I&D scheduled. 8/28/24 CT chest impression: left superior chest  wall/subcutaneous masslike soft tissue density which may represent hematoma post removal of infusion port.     - Doxycycline 100 BID for 5 days (finished 8/27)          Constipation  Pt had 2 episodes of diarrhea before admission and no recent sick contacts. Pt had recent hospitalization and abx use but on chronic opoid use.     - Lactulose and Senna prn  - Pt requested Milk of Magnesia prn    Anemia  -Baseline Hgb is around 9  -Macrocytic MCV; most likely 2/2 to Beta Thalassemia   Recent Labs     08/28/24  0621 08/29/24  0300 08/30/24  0504   HGB 8.9* 8.8* 10.1*   HCT 26.9* 27.1* 31.9*       - Monitor serial CBC: Daily  - Transfuse PRBC if patient becomes hemodynamically unstable, symptomatic or H/H drops below 7/21.    History of pulmonary embolism  PMH clots, PE    - continue Enoxaparin    Anxiety  -continue flouxetine      Hypertension  - continue home meds    Hypertension Medications               amLODIPine (NORVASC) 10 MG tablet Take 1 tablet (10 mg total) by mouth once daily.    carvediloL (COREG) 25 MG tablet Take 1 tablet (25 mg total) by mouth 2 (two) times daily.                   VTE Risk Mitigation (From admission, onward)           Ordered     enoxaparin injection 80 mg  Every 12 hours         08/29/24 1414     IP VTE HIGH RISK PATIENT  Once         08/22/24 1326     Place sequential compression device  Until discontinued         08/22/24 1326                    Discharge Planning   ALYSE: 9/4/2024     Code Status: Full Code   Is the patient medically ready for discharge?:     Reason for patient still in hospital (select all that apply): Patient trending condition  Discharge Plan A: Home   Discharge Delays: None known at this time              Jacob Vidal MD  Department of Hospital Medicine   [facility] - Internal Medicine Telemetry

## 2024-08-30 NOTE — ASSESSMENT & PLAN NOTE
Patient had recently gotten port on left chest removed and admits to pain on site. Repeat US report shows: Interval increase in size of a complex subcutaneous collection in the left upper chest, measuring up to 5.4 cm. Consulted general surgery, feel more likely hematoma. No I&D scheduled. 8/28/24 CT chest impression: left superior chest wall/subcutaneous masslike soft tissue density which may represent hematoma post removal of infusion port.     - Doxycycline 100 BID for 5 days (finished 8/27)

## 2024-08-30 NOTE — PROGRESS NOTES
Patient requesting 1x dose of IV dilaudid.  MD okay to give dilaudid now according to new prn order. OC bringing to patient.

## 2024-08-30 NOTE — ASSESSMENT & PLAN NOTE
44 year old female with history of SC anemia with history of acute chest, beta thalassemia, HTN, obesity, iron deficiency anemia, hx of PE on apixaban, RLE fasciotomy, and recent hospitalization for bacteremia s/p antibiotic course and recent sickle cell pain crisis.     - Continue titrating pain meds  - Pain control:   - continue scheduled Morphine (12 BID), Gabapentin (400 BID), Hydroxyurea (1,000 QD),  - continue prn Oxycodone (15), IV Dilaudid (2) for breakthrough pain   - pt instructed to avoid use of IV prn as much as possible  - consider higher dose of MS contin on dc

## 2024-08-30 NOTE — ASSESSMENT & PLAN NOTE
Likely secondary to SC crisis pain  Pt previously responded well to compazine and promethazine but poorly to zofran    - Promethazine and Compazine prn

## 2024-08-30 NOTE — ASSESSMENT & PLAN NOTE
-Baseline Hgb is around 9  -Macrocytic MCV; most likely 2/2 to Beta Thalassemia   Recent Labs     08/28/24  0621 08/29/24  0300 08/30/24  0504   HGB 8.9* 8.8* 10.1*   HCT 26.9* 27.1* 31.9*       - Monitor serial CBC: Daily  - Transfuse PRBC if patient becomes hemodynamically unstable, symptomatic or H/H drops below 7/21.

## 2024-08-30 NOTE — ASSESSMENT & PLAN NOTE
- continue home meds    Hypertension Medications               amLODIPine (NORVASC) 10 MG tablet Take 1 tablet (10 mg total) by mouth once daily.    carvediloL (COREG) 25 MG tablet Take 1 tablet (25 mg total) by mouth 2 (two) times daily.

## 2024-08-30 NOTE — PROGRESS NOTES
Patient requesting to review discharge meds with MD at bedside.  MD notified and will be by shortly with rounds.

## 2024-08-31 VITALS
OXYGEN SATURATION: 98 % | BODY MASS INDEX: 44.42 KG/M2 | SYSTOLIC BLOOD PRESSURE: 131 MMHG | HEART RATE: 76 BPM | WEIGHT: 241.38 LBS | TEMPERATURE: 99 F | DIASTOLIC BLOOD PRESSURE: 79 MMHG | HEIGHT: 62 IN | RESPIRATION RATE: 18 BRPM

## 2024-08-31 LAB
ALBUMIN SERPL BCP-MCNC: 3.1 G/DL (ref 3.5–5.2)
ALP SERPL-CCNC: 64 U/L (ref 55–135)
ALT SERPL W/O P-5'-P-CCNC: 15 U/L (ref 10–44)
ANION GAP SERPL CALC-SCNC: 9 MMOL/L (ref 8–16)
AST SERPL-CCNC: 34 U/L (ref 10–40)
BASOPHILS # BLD AUTO: 0.03 K/UL (ref 0–0.2)
BASOPHILS NFR BLD: 0.5 % (ref 0–1.9)
BILIRUB SERPL-MCNC: 0.4 MG/DL (ref 0.1–1)
BUN SERPL-MCNC: 14 MG/DL (ref 6–20)
CALCIUM SERPL-MCNC: 9.4 MG/DL (ref 8.7–10.5)
CHLORIDE SERPL-SCNC: 105 MMOL/L (ref 95–110)
CO2 SERPL-SCNC: 25 MMOL/L (ref 23–29)
CREAT SERPL-MCNC: 1 MG/DL (ref 0.5–1.4)
DIFFERENTIAL METHOD BLD: ABNORMAL
EOSINOPHIL # BLD AUTO: 0.2 K/UL (ref 0–0.5)
EOSINOPHIL NFR BLD: 3.7 % (ref 0–8)
ERYTHROCYTE [DISTWIDTH] IN BLOOD BY AUTOMATED COUNT: 21.2 % (ref 11.5–14.5)
EST. GFR  (NO RACE VARIABLE): >60 ML/MIN/1.73 M^2
FACT X PPP CHRO-ACNC: 1.02 IU/ML (ref 0.3–0.7)
GLUCOSE SERPL-MCNC: 73 MG/DL (ref 70–110)
HCT VFR BLD AUTO: 27.5 % (ref 37–48.5)
HGB BLD-MCNC: 9.3 G/DL (ref 12–16)
IMM GRANULOCYTES # BLD AUTO: 0.01 K/UL (ref 0–0.04)
IMM GRANULOCYTES NFR BLD AUTO: 0.2 % (ref 0–0.5)
LYMPHOCYTES # BLD AUTO: 2.6 K/UL (ref 1–4.8)
LYMPHOCYTES NFR BLD: 40.9 % (ref 18–48)
MAGNESIUM SERPL-MCNC: 2 MG/DL (ref 1.6–2.6)
MCH RBC QN AUTO: 25.5 PG (ref 27–31)
MCHC RBC AUTO-ENTMCNC: 33.8 G/DL (ref 32–36)
MCV RBC AUTO: 76 FL (ref 82–98)
MONOCYTES # BLD AUTO: 0.7 K/UL (ref 0.3–1)
MONOCYTES NFR BLD: 11.1 % (ref 4–15)
NEUTROPHILS # BLD AUTO: 2.7 K/UL (ref 1.8–7.7)
NEUTROPHILS NFR BLD: 43.6 % (ref 38–73)
NRBC BLD-RTO: 1 /100 WBC
PHOSPHATE SERPL-MCNC: 3.9 MG/DL (ref 2.7–4.5)
PLATELET # BLD AUTO: 335 K/UL (ref 150–450)
PMV BLD AUTO: 11.3 FL (ref 9.2–12.9)
POTASSIUM SERPL-SCNC: 4.9 MMOL/L (ref 3.5–5.1)
PROT SERPL-MCNC: 6.8 G/DL (ref 6–8.4)
RBC # BLD AUTO: 3.64 M/UL (ref 4–5.4)
SODIUM SERPL-SCNC: 139 MMOL/L (ref 136–145)
WBC # BLD AUTO: 6.28 K/UL (ref 3.9–12.7)

## 2024-08-31 PROCEDURE — 63600175 PHARM REV CODE 636 W HCPCS

## 2024-08-31 PROCEDURE — 80053 COMPREHEN METABOLIC PANEL: CPT

## 2024-08-31 PROCEDURE — 63600175 PHARM REV CODE 636 W HCPCS: Performed by: HOSPITALIST

## 2024-08-31 PROCEDURE — 83735 ASSAY OF MAGNESIUM: CPT

## 2024-08-31 PROCEDURE — 84100 ASSAY OF PHOSPHORUS: CPT

## 2024-08-31 PROCEDURE — 36415 COLL VENOUS BLD VENIPUNCTURE: CPT

## 2024-08-31 PROCEDURE — 36415 COLL VENOUS BLD VENIPUNCTURE: CPT | Performed by: HOSPITALIST

## 2024-08-31 PROCEDURE — 85520 HEPARIN ASSAY: CPT | Performed by: HOSPITALIST

## 2024-08-31 PROCEDURE — 25000003 PHARM REV CODE 250

## 2024-08-31 PROCEDURE — 25000003 PHARM REV CODE 250: Performed by: HOSPITALIST

## 2024-08-31 PROCEDURE — 85025 COMPLETE CBC W/AUTO DIFF WBC: CPT

## 2024-08-31 RX ORDER — LACTULOSE 10 G/15ML
20 SOLUTION ORAL; RECTAL NIGHTLY
Qty: 900 ML | Refills: 0 | Status: SHIPPED | OUTPATIENT
Start: 2024-08-31 | End: 2024-09-30

## 2024-08-31 RX ORDER — MORPHINE SULFATE 30 MG/1
30 TABLET, FILM COATED, EXTENDED RELEASE ORAL EVERY 12 HOURS
Qty: 60 TABLET | Refills: 0 | Status: SHIPPED | OUTPATIENT
Start: 2024-08-31 | End: 2024-09-30

## 2024-08-31 RX ORDER — GABAPENTIN 400 MG/1
400 CAPSULE ORAL 2 TIMES DAILY
Qty: 60 CAPSULE | Refills: 0 | Status: SHIPPED | OUTPATIENT
Start: 2024-08-31 | End: 2024-09-30

## 2024-08-31 RX ORDER — OXYCODONE AND ACETAMINOPHEN 10; 325 MG/1; MG/1
1 TABLET ORAL EVERY 6 HOURS PRN
Qty: 120 TABLET | Refills: 0 | Status: SHIPPED | OUTPATIENT
Start: 2024-08-31 | End: 2024-09-30

## 2024-08-31 RX ORDER — AMLODIPINE BESYLATE 10 MG/1
10 TABLET ORAL DAILY
Qty: 90 TABLET | Refills: 3 | Status: SHIPPED | OUTPATIENT
Start: 2024-08-31

## 2024-08-31 RX ORDER — HEPARIN 100 UNIT/ML
100 SYRINGE INTRAVENOUS
Status: DISCONTINUED | OUTPATIENT
Start: 2024-08-31 | End: 2024-08-31

## 2024-08-31 RX ORDER — TIZANIDINE 4 MG/1
4 TABLET ORAL EVERY 8 HOURS PRN
Qty: 90 TABLET | Refills: 0 | Status: SHIPPED | OUTPATIENT
Start: 2024-08-31 | End: 2024-09-30

## 2024-08-31 RX ORDER — HEPARIN 100 UNIT/ML
3 SYRINGE INTRAVENOUS
Status: DISCONTINUED | OUTPATIENT
Start: 2024-08-31 | End: 2024-08-31 | Stop reason: HOSPADM

## 2024-08-31 RX ADMIN — FOLIC ACID 1 MG: 1 TABLET ORAL at 09:08

## 2024-08-31 RX ADMIN — PROMETHAZINE HYDROCHLORIDE 25 MG: 12.5 TABLET ORAL at 03:08

## 2024-08-31 RX ADMIN — MORPHINE SULFATE 30 MG: 30 TABLET, FILM COATED, EXTENDED RELEASE ORAL at 09:08

## 2024-08-31 RX ADMIN — PROMETHAZINE HYDROCHLORIDE 25 MG: 12.5 TABLET ORAL at 04:08

## 2024-08-31 RX ADMIN — HEPARIN 300 UNITS: 100 SYRINGE at 04:08

## 2024-08-31 RX ADMIN — HYDROMORPHONE HYDROCHLORIDE 2 MG: 2 INJECTION INTRAMUSCULAR; INTRAVENOUS; SUBCUTANEOUS at 02:08

## 2024-08-31 RX ADMIN — PROMETHAZINE HYDROCHLORIDE 25 MG: 12.5 TABLET ORAL at 10:08

## 2024-08-31 RX ADMIN — CARVEDILOL 25 MG: 25 TABLET, FILM COATED ORAL at 09:08

## 2024-08-31 RX ADMIN — OXYCODONE 15 MG: 5 TABLET ORAL at 12:08

## 2024-08-31 RX ADMIN — ENOXAPARIN SODIUM 80 MG: 80 INJECTION SUBCUTANEOUS at 09:08

## 2024-08-31 RX ADMIN — AMLODIPINE BESYLATE 10 MG: 10 TABLET ORAL at 09:08

## 2024-08-31 RX ADMIN — OXYCODONE 15 MG: 5 TABLET ORAL at 08:08

## 2024-08-31 RX ADMIN — FLUOXETINE HYDROCHLORIDE 40 MG: 20 CAPSULE ORAL at 09:08

## 2024-08-31 RX ADMIN — DIPHENHYDRAMINE HYDROCHLORIDE 25 MG: 50 INJECTION, SOLUTION INTRAMUSCULAR; INTRAVENOUS at 05:08

## 2024-08-31 RX ADMIN — HYDROMORPHONE HYDROCHLORIDE 2 MG: 2 INJECTION INTRAMUSCULAR; INTRAVENOUS; SUBCUTANEOUS at 03:08

## 2024-08-31 RX ADMIN — OXYCODONE 15 MG: 5 TABLET ORAL at 04:08

## 2024-08-31 RX ADMIN — SODIUM CHLORIDE, POTASSIUM CHLORIDE, SODIUM LACTATE AND CALCIUM CHLORIDE: 600; 310; 30; 20 INJECTION, SOLUTION INTRAVENOUS at 12:08

## 2024-08-31 RX ADMIN — GABAPENTIN 400 MG: 300 CAPSULE ORAL at 09:08

## 2024-08-31 RX ADMIN — HYDROXYUREA 1000 MG: 500 CAPSULE ORAL at 09:08

## 2024-08-31 RX ADMIN — HYDROMORPHONE HYDROCHLORIDE 2 MG: 2 INJECTION INTRAMUSCULAR; INTRAVENOUS; SUBCUTANEOUS at 10:08

## 2024-08-31 NOTE — PROGRESS NOTES
[facility] - Internal Medicine [facility] Medicine  Progress Note    Patient Name: Nazanin Malone  MRN: [id number]  Patient Class: IP- Inpatient   Admission Date: 8/22/2024  Length of Stay: 8 days  Attending Physician: Paola Mckeon*  Primary Care Provider: Pee Montgomery MD        Subjective:     Principal Problem:Sickle cell pain crisis        HPI:  45yo F with hx of sickle cell beta thalassemia disease, HTN, asthma, depression, PE comes in to ED with chest pain and abdominal pain. Patient also mentions associated diarrhea, bilious vomiting, productive cough with clear sputum, decreased appetite, and slight dyspnea at rest. Patient was recently discharged for a sickle cell crisis, PE, UTI and feels like this sickle cell again. She is adherent to the Lovenox and had almost finished course of Keflex except for the last dose which she threw up.     Patient states the abdominal pain is diffuse but worse on the RUQ and with palpation. Patient has some diffuse chest pain that is worsened with palpation around especially around the port sites. Patient states all this symptoms she is experiencing started yesterday. Patient currently takes MS contin BID and Percocet Q6H for pain and promethazine for nausea. She was give lasix from her last admission due to to upper extremity edema and currently does not take hydroxyurea but expresses wishes to take it. Patient denies any dysuria and hematuria or any recent stressors that could have caused this latest flare of sickle cell.     In the ED, CBC, CMP, CXR and RUQ US was ordered and Dilaudid and Morphine were given for pain control.      Overview/Hospital Course:  Patient was admitted due to vomiting, diarrhea and chest  and abdominal pain. Pain is being controlled with her home medications along with dilaudid PCA pump.  PCA pump cancelled on 8/23. Pain regimen transitioned to IV dilaudid with breakthrough oxy PRN resulting in improvement in pain  control. Pain will be reassessed again daily.      Gen surg consulted for growing and painful area on left chest where port used to be, believe it is more likely a hematoma, will not perform I&D at this time. LE US ordered for pain showed no DVT. Upper extremity US showed thrombosis in left IJV which is currently being treated with the therapeutic dose of Lovenox the patient is on. Patient is on Lovenox for her hx of PE. CT chest showed a mass likely to be hematoma which might be collecting and pushing on left ex port site causing pain. In addition, will try to ween patient off high dose/IV pain meds and transition to PO.     Interval History: For breakthrough pain, Patient had used total 2 doses of Dilaudid 4IV and 4 doses of Dilaudid 2IV from yesterday to this morning. Also had 5 doses of oxycodone yesterday.     Patient states pain was well controlled last night.  Patient had 2 bowel movements yesterday evening.  Patient denies any chest pain, shortness of breath abdominal pain.    Review of Systems  Objective:     Vital Signs (Most Recent):  Temp: 98.6 °F (37 °C) (08/31/24 0351)  Pulse: 76 (08/31/24 0351)  Resp: 18 (08/31/24 0351)  BP: 138/84 (08/31/24 0351)  SpO2: 96 % (08/31/24 0351) Vital Signs (24h Range):  Temp:  [98.6 °F (37 °C)-98.9 °F (37.2 °C)] 98.6 °F (37 °C)  Pulse:  [71-76] 76  Resp:  [16-20] 18  SpO2:  [95 %-99 %] 96 %  BP: (108-138)/(68-84) 138/84     Weight: 109.5 kg (241 lb 6.5 oz)  Body mass index is 44.15 kg/m².  No intake or output data in the 24 hours ending 08/31/24 0656      Physical Exam  Constitutional:       General: She is not in acute distress.  Cardiovascular:      Rate and Rhythm: Normal rate and regular rhythm.      Heart sounds: No murmur heard.  Pulmonary:      Effort: Pulmonary effort is normal. No respiratory distress.      Breath sounds: Normal breath sounds.   Abdominal:      Tenderness: There is no abdominal tenderness.   Musculoskeletal:         General: Tenderness present.  "     Comments: Port at R side chest  Hematoma at L side chest is tender, unchanged from previous   Neurological:      Mental Status: She is alert. Mental status is at baseline.   Psychiatric:         Mood and Affect: Mood normal.             Significant Labs: All pertinent labs within the past 24 hours have been reviewed.  Blood Culture: No results for input(s): "LABBLOO" in the last 48 hours.  CBC:   Recent Labs   Lab 08/30/24  0504 08/31/24  0549   WBC 6.55 6.28   HGB 10.1* 9.3*   HCT 31.9* 27.5*    335     CMP:   Recent Labs   Lab 08/30/24  1111 08/31/24  0549    139   K 4.7 4.9    105   CO2 21* 25   GLU 45* 73   BUN 19 14   CREATININE 1.1 1.0   CALCIUM 9.6 9.4   PROT 6.8 6.8   ALBUMIN 3.4* 3.1*   BILITOT 0.4 0.4   ALKPHOS 66 64   AST 22 34   ALT 12 15   ANIONGAP 11 9       Significant Imaging: I have reviewed all pertinent imaging results/findings within the past 24 hours.    Assessment/Plan:      * Sickle cell pain crisis  44 year old female with history of SC anemia with history of acute chest, beta thalassemia, HTN, obesity, iron deficiency anemia, hx of PE on apixaban, RLE fasciotomy, and recent hospitalization for bacteremia s/p antibiotic course and recent sickle cell pain crisis.     - Continue titrating pain meds  - Pain control:   - Scheduled PO Morphine (30 BID), Gabapentin (400 BID), Hydroxyurea (1,000 QD),  - PRN PO Oxycodone (15 Q4), IV Dilaudid (2 Q4) for breakthrough pain   - pt instructed to avoid use of IV prn as much as possible  - consider higher dose of MS contin on dc    -consider switching to PO Dilaudid 2 Q4 today on (8/31)    Fluid collection at surgical site  Patient had recently gotten port on left chest removed and admits to pain on site. Repeat US report shows: Interval increase in size of a complex subcutaneous collection in the left upper chest, measuring up to 5.4 cm. Consulted general surgery, feel more likely hematoma. No I&D scheduled. 8/28/24 CT chest " impression: left superior chest wall/subcutaneous masslike soft tissue density which may represent hematoma post removal of infusion port.     - Doxycycline 100 BID for 5 days (finished 8/27)          Constipation  Pt had 2 episodes of diarrhea before admission and no recent sick contacts. Pt had recent hospitalization and abx use but on chronic opoid use.     - Lactulose and Senna prn  - Pt requested Milk of Magnesia prn    History of pulmonary embolism  PMH clots, PE    - continue Enoxaparin    Anemia  -Baseline Hgb is around 9  -Macrocytic MCV; most likely 2/2 to Beta Thalassemia   Recent Labs     08/29/24  0300 08/30/24  0504 08/31/24  0549   HGB 8.8* 10.1* 9.3*   HCT 27.1* 31.9* 27.5*     - Monitor serial CBC: Daily  - Transfuse PRBC if patient becomes hemodynamically unstable, symptomatic or H/H drops below 7/21.    Nausea and vomiting  Likely secondary to SC crisis pain  Pt previously responded well to compazine and promethazine but poorly to zofran    - Promethazine and Compazine prn      Hypertension  - continue home meds    Hypertension Medications               amLODIPine (NORVASC) 10 MG tablet Take 1 tablet (10 mg total) by mouth once daily.    carvediloL (COREG) 25 MG tablet Take 1 tablet (25 mg total) by mouth 2 (two) times daily.                 Anxiety  -continue flouxetine        VTE Risk Mitigation (From admission, onward)           Ordered     enoxaparin injection 80 mg  Every 12 hours         08/29/24 1414     IP VTE HIGH RISK PATIENT  Once         08/22/24 1326     Place sequential compression device  Until discontinued         08/22/24 1326                    Discharge Planning   ALYSE: 9/4/2024     Code Status: Full Code   Is the patient medically ready for discharge?:     Reason for patient still in hospital (select all that apply): Patient trending condition  Discharge Plan A: Home   Discharge Delays: None known at this time              Lin Torrez MD  Department of Hospital  Medicine   [facility] - [facility] Medicine Telemetry

## 2024-08-31 NOTE — ASSESSMENT & PLAN NOTE
-Baseline Hgb is around 9  -Macrocytic MCV; most likely 2/2 to Beta Thalassemia   Recent Labs     08/29/24  0300 08/30/24  0504 08/31/24  0549   HGB 8.8* 10.1* 9.3*   HCT 27.1* 31.9* 27.5*     - Monitor serial CBC: Daily  - Transfuse PRBC if patient becomes hemodynamically unstable, symptomatic or H/H drops below 7/21.

## 2024-08-31 NOTE — SUBJECTIVE & OBJECTIVE
"Interval History: For breakthrough pain, Patient had used total 2 doses of Dilaudid 4IV and 4 doses of Dilaudid 2IV from yesterday to this morning. Also had 5 doses of oxycodone yesterday.     Patient states pain was well controlled last night.  Patient had 2 bowel movements yesterday evening.  Patient denies any chest pain, shortness of breath abdominal pain.    Review of Systems  Objective:     Vital Signs (Most Recent):  Temp: 98.6 °F (37 °C) (08/31/24 0351)  Pulse: 76 (08/31/24 0351)  Resp: 18 (08/31/24 0351)  BP: 138/84 (08/31/24 0351)  SpO2: 96 % (08/31/24 0351) Vital Signs (24h Range):  Temp:  [98.6 °F (37 °C)-98.9 °F (37.2 °C)] 98.6 °F (37 °C)  Pulse:  [71-76] 76  Resp:  [16-20] 18  SpO2:  [95 %-99 %] 96 %  BP: (108-138)/(68-84) 138/84     Weight: 109.5 kg (241 lb 6.5 oz)  Body mass index is 44.15 kg/m².  No intake or output data in the 24 hours ending 08/31/24 0656      Physical Exam  Constitutional:       General: She is not in acute distress.  Cardiovascular:      Rate and Rhythm: Normal rate and regular rhythm.      Heart sounds: No murmur heard.  Pulmonary:      Effort: Pulmonary effort is normal. No respiratory distress.      Breath sounds: Normal breath sounds.   Abdominal:      Tenderness: There is no abdominal tenderness.   Musculoskeletal:         General: Tenderness present.      Comments: Port at R side chest  Hematoma at L side chest is tender, unchanged from previous   Neurological:      Mental Status: She is alert. Mental status is at baseline.   Psychiatric:         Mood and Affect: Mood normal.             Significant Labs: All pertinent labs within the past 24 hours have been reviewed.  Blood Culture: No results for input(s): "LABBLOO" in the last 48 hours.  CBC:   Recent Labs   Lab 08/30/24  0504 08/31/24  0549   WBC 6.55 6.28   HGB 10.1* 9.3*   HCT 31.9* 27.5*    335     CMP:   Recent Labs   Lab 08/30/24  1111 08/31/24  0549    139   K 4.7 4.9    105   CO2 21* 25   GLU " 45* 73   BUN 19 14   CREATININE 1.1 1.0   CALCIUM 9.6 9.4   PROT 6.8 6.8   ALBUMIN 3.4* 3.1*   BILITOT 0.4 0.4   ALKPHOS 66 64   AST 22 34   ALT 12 15   ANIONGAP 11 9       Significant Imaging: I have reviewed all pertinent imaging results/findings within the past 24 hours.

## 2024-08-31 NOTE — PLAN OF CARE
Problem: Adult Inpatient Plan of Care  Goal: Plan of Care Review  Outcome: Met  Flowsheets (Taken 8/31/2024 3436)  Plan of Care Reviewed With:   patient   family  Goal: Patient-Specific Goal (Individualized)  Outcome: Met  Goal: Absence of Hospital-Acquired Illness or Injury  Outcome: Met  Goal: Optimal Comfort and Wellbeing  Outcome: Met  Goal: Readiness for Transition of Care  Outcome: Met   Pt is AAOX4, pt educated on discharge meds and follow up appointments , meds to be  at the pharmacy, hard copy of morphine prescription given to pt.  AVS given to pt.  Pt voiced understanding. Pt transported via wheelchair accompanied by family.

## 2024-08-31 NOTE — PLAN OF CARE
[facility] - Internal Medicine Telemetry  Discharge Reassessment    Primary Care Provider: Pee Montgomery MD    Expected Discharge Date: 8/31/2024    Reassessment (most recent)       Discharge Reassessment - 08/31/24 1503          Discharge Reassessment    Assessment Type Discharge Planning Reassessment (P)      Did the patient's condition or plan change since previous assessment? No (P)      Discharge Plan discussed with: Patient (P)      Communicated ALYSE with patient/caregiver Yes (P)      Discharge Plan A Home with family (P)      Discharge Plan B Home (P)      DME Needed Upon Discharge  none (P)      Transition of Care Barriers None (P)         Post-Acute Status    Post-Acute Authorization Other (P)      Coverage [employer] MANAGED MEDICARE - [employer] MEDICARE PLAN HMO - (P)      Other Status Awaiting f/u Appts (P)                  Discharge Plan A and Plan B have been determined by review of patient's clinical status, future medical and therapeutic needs, and coverage/benefits for post-acute care in coordination with multidisciplinary team members.                       ADRIEN Lehman, LMSW  [facility]  Case Management  Ext. [phone]

## 2024-08-31 NOTE — DISCHARGE SUMMARY
[facility] - Internal Medicine [facility] Medicine  Discharge Summary      Patient Name: Nazanin Malone  MRN: [id number]  VLADIMIR: [id number]  Patient Class: IP- Inpatient  Admission Date: 8/22/2024  Hospital Length of Stay: 8 days  Discharge Date and Time:  08/31/2024 3:14 PM  Attending Physician: Paola Mckeon*   Discharging Provider: Lin Torrez MD  Primary Care Provider: Pee Montgomery MD  Hospital Medicine Team: [facility] 4 Lin Torrez MD  Primary Care Team: [facility]    HPI:   45yo F with hx of sickle cell beta thalassemia disease, HTN, asthma, depression, PE comes in to ED with chest pain and abdominal pain. Patient also mentions associated diarrhea, bilious vomiting, productive cough with clear sputum, decreased appetite, and slight dyspnea at rest. Patient was recently discharged for a sickle cell crisis, PE, UTI and feels like this sickle cell again. She is adherent to the Lovenox and had almost finished course of Keflex except for the last dose which she threw up.     Patient states the abdominal pain is diffuse but worse on the RUQ and with palpation. Patient has some diffuse chest pain that is worsened with palpation around especially around the port sites. Patient states all this symptoms she is experiencing started yesterday. Patient currently takes MS contin BID and Percocet Q6H for pain and promethazine for nausea. She was give lasix from her last admission due to to upper extremity edema and currently does not take hydroxyurea but expresses wishes to take it. Patient denies any dysuria and hematuria or any recent stressors that could have caused this latest flare of sickle cell.     In the ED, CBC, CMP, CXR and RUQ US was ordered and Dilaudid and Morphine were given for pain control.      * No surgery found *      Hospital Course:   Patient was admitted due to vomiting, diarrhea and chest  and abdominal pain. Pain is being controlled  with her home medications along with dilaudid PCA pump. PCA pump cancelled on 8/23. Pain regimen transitioned to IV dilaudid with breakthrough oxy PRN resulting in improvement in pain control. Pain will be reassessed again daily. Patient completed a course of Doxycycline for suspected infection.     Gen surg consulted for growing and painful area on left chest where port used to be, believe it is more likely a hematoma, will not perform I&D at this time. LE US ordered for pain showed no DVT. Upper extremity US showed thrombosis in left IJV which is currently being treated with the therapeutic dose of Lovenox the patient is on. Patient is on Lovenox for her hx of PE. CT chest showed a mass likely to be hematoma which might be collecting and pushing on left ex port site causing pain. Patient is slowly off the high dose/IV pain meds and transition to PO. Patient states pain has been controlled and tolerable. Patient has a follow up with Heme/Onc in a month and will be discharged with PO pain meds.      Goals of Care Treatment Preferences:  Code Status: Full Code      SDOH Screening:  The patient was screened for food insecurity, housing instability, transportation needs, utility difficulties, and interpersonal safety. The social determinant(s) of health identified as a concern this admission are:  Housing instability    The plan to address these concerns is: 60 min    Social Determinants of Health with Concerns     Housing Stability: High Risk (8/24/2024)     Vital Signs (Most Recent):  Temp: 98.6 °F (37 °C) (08/31/24 0351)  Pulse: 76 (08/31/24 0351)  Resp: 18 (08/31/24 0351)  BP: 138/84 (08/31/24 0351)  SpO2: 96 % (08/31/24 0351) Vital Signs (24h Range):  Temp:  [98.6 °F (37 °C)-98.9 °F (37.2 °C)] 98.6 °F (37 °C)  Pulse:  [71-76] 76  Resp:  [16-20] 18  SpO2:  [95 %-99 %] 96 %  BP: (108-138)/(68-84) 138/84      Weight: 109.5 kg (241 lb 6.5 oz)  Body mass index is 44.15 kg/m².  No intake or output data in the 24 hours  ending 08/31/24 0656      Physical Exam  Constitutional:       General: She is not in acute distress.  Cardiovascular:      Rate and Rhythm: Normal rate and regular rhythm.      Heart sounds: No murmur heard.  Pulmonary:      Effort: Pulmonary effort is normal. No respiratory distress.      Breath sounds: Normal breath sounds.   Abdominal:      Tenderness: There is no abdominal tenderness.   Musculoskeletal:         General: Tenderness present.      Comments: Port at R side chest  Hematoma at L side chest is tender, unchanged from previous   Neurological:      Mental Status: She is alert. Mental status is at baseline.   Psychiatric:         Mood and Affect: Mood normal.     Consults:   Consults (From admission, onward)          Status Ordering Provider     Inpatient consult to General Surgery  Once        Provider:  (Not yet assigned)    Completed SASCHA DE LEON            No new Assessment & Plan notes have been filed under this hospital service since the last note was generated.  Service: Hospital Medicine    Final Active Diagnoses:    Diagnosis Date Noted POA    PRINCIPAL PROBLEM:  Sickle cell pain crisis [D57.00] 12/21/2020 Yes    Fluid collection at surgical site [T88.8XXA] 08/22/2024 Unknown    Constipation [K59.00] 08/22/2024 Unknown    History of pulmonary embolism [Z86.711] 06/08/2020 Yes    Anemia [D64.9] 08/22/2024 Unknown    Nausea and vomiting [R11.2] 08/22/2024 Unknown    Hypertension [I10] 04/16/2017 Yes    Anxiety [F41.9] 03/20/2018 Yes      Problems Resolved During this Admission:       Discharged Condition: stable    Disposition: Home or Self Care    Follow Up:    Patient Instructions:   No discharge procedures on file.      Pending Diagnostic Studies:       None           Medications:  Reconciled Home Medications:      Medication List        CHANGE how you take these medications      gabapentin 400 MG capsule  Commonly known as: NEURONTIN  Take 1 capsule (400 mg total) by mouth 2 (two) times  daily.  What changed: when to take this     lactulose 10 gram/15 mL solution  Commonly known as: CHRONULAC  Take 30 mLs (20 g total) by mouth every evening.  What changed:   when to take this  reasons to take this     morphine 30 MG 12 hr tablet  Commonly known as: MS CONTIN  Take 1 tablet (30 mg total) by mouth every 12 (twelve) hours.  What changed:   medication strength  how much to take  when to take this            CONTINUE taking these medications      acetaminophen 500 MG tablet  Commonly known as: TYLENOL  Take 1,000 mg by mouth daily as needed for Pain.     albuterol 90 mcg/actuation inhaler  Commonly known as: VENTOLIN HFA  Inhale 2 puffs into the lungs every 6 (six) hours as needed for Wheezing or Shortness of Breath. Rescue     amLODIPine 10 MG tablet  Commonly known as: NORVASC  Take 1 tablet (10 mg total) by mouth once daily.     carvediloL 25 MG tablet  Commonly known as: COREG  Take 1 tablet (25 mg total) by mouth 2 (two) times daily.     enoxaparin 120 mg/0.8 mL Syrg  Commonly known as: LOVENOX  Push out 0.1 ml then Inject 0.7 mLs (105 mg total) into the skin 2 (two) times daily.     FLUoxetine 40 MG capsule  Take 1 capsule (40 mg total) by mouth once daily.     fluticasone propionate 50 mcg/actuation nasal spray  Commonly known as: FLONASE  INSTILL 1 SPRAY INTO EACH NOSTRIL EVERY DAY     folic acid 1 MG tablet  Commonly known as: FOLVITE  Take 1 tablet (1 mg total) by mouth once daily.     oxyCODONE-acetaminophen  mg per tablet  Commonly known as: PERCOCET  Take 1 tablet by mouth every 6 (six) hours as needed for Pain.     promethazine 25 MG tablet  Commonly known as: PHENERGAN  Take 1 tablet (25 mg total) by mouth every 6 (six) hours as needed for Nausea.     senna-docusate 8.6-50 mg 8.6-50 mg per tablet  Commonly known as: PERICOLACE  Take 1 tablet by mouth daily as needed for Constipation.     tiZANidine 4 MG tablet  Commonly known as: ZANAFLEX  Take 1 tablet (4 mg total) by mouth every 8  (eight) hours as needed.     VITAMIN C ORAL  Take 1 capsule by mouth once daily.            STOP taking these medications      cephALEXin 500 MG capsule  Commonly known as: KEFLEX     furosemide 20 MG tablet  Commonly known as: LASIX              Indwelling Lines/Drains at time of discharge:   Lines/Drains/Airways       Central Venous Catheter Line  Duration             Port A Cath Single Lumen 07/28/24 0830 Subclavian Right 34 days                    Time spent on the discharge of patient: 60 minutes         Lin Torrez MD  Department of Hospital Medicine  [facility] - Internal Medicine Telemetry

## 2024-08-31 NOTE — DISCHARGE SUMMARY
[facility] - Internal Medicine [facility] Medicine  Discharge Summary      Patient Name: Nazanin Malone  MRN: [id number]  VLADIMIR: [id number]  Patient Class: IP- Inpatient  Admission Date: 8/22/2024  Hospital Length of Stay: 8 days  Discharge Date and Time:  08/31/2024 2:41 PM  Attending Physician: Paola Mckeon*   Discharging Provider: Lin Torrez MD  Primary Care Provider: Pee Montgomery MD  Hospital Medicine Team: [facility]OS[facility] MED 4 iLn Torrez MD  Primary Care Team: [facility]OS[facility] MED 4    HPI:   43yo F with hx of sickle cell beta thalassemia disease, HTN, asthma, depression, PE comes in to ED with chest pain and abdominal pain. Patient also mentions associated diarrhea, bilious vomiting, productive cough with clear sputum, decreased appetite, and slight dyspnea at rest. Patient was recently discharged for a sickle cell crisis, PE, UTI and feels like this sickle cell again. She is adherent to the Lovenox and had almost finished course of Keflex except for the last dose which she threw up.     Patient states the abdominal pain is diffuse but worse on the RUQ and with palpation. Patient has some diffuse chest pain that is worsened with palpation around especially around the port sites. Patient states all this symptoms she is experiencing started yesterday. Patient currently takes MS contin BID and Percocet Q6H for pain and promethazine for nausea. She was give lasix from her last admission due to to upper extremity edema and currently does not take hydroxyurea but expresses wishes to take it. Patient denies any dysuria and hematuria or any recent stressors that could have caused this latest flare of sickle cell.     In the ED, CBC, CMP, CXR and RUQ US was ordered and Dilaudid and Morphine were given for pain control.      * No surgery found *      Hospital Course:   Patient was admitted due to vomiting, diarrhea and chest  and abdominal pain. Pain is being controlled  with her home medications along with dilaudid PCA pump. PCA pump cancelled on 8/23. Pain regimen transitioned to IV dilaudid with breakthrough oxy PRN resulting in improvement in pain control. Pain will be reassessed again daily. Patient completed a course of Doxycycline for suspected infection.     Gen surg consulted for growing and painful area on left chest where port used to be, believe it is more likely a hematoma, will not perform I&D at this time. LE US ordered for pain showed no DVT. Upper extremity US showed thrombosis in left IJV which is currently being treated with the therapeutic dose of Lovenox the patient is on. Patient is on Lovenox for her hx of PE. CT chest showed a mass likely to be hematoma which might be collecting and pushing on left ex port site causing pain. Patient is slowly off the high dose/IV pain meds and transition to PO. Patient states pain has been controlled and tolerable. Patient has a follow up with Heme/Onc in a month and will be discharged with PO pain meds.      Goals of Care Treatment Preferences:  Code Status: Full Code    Social Determinants of Health with Concerns     Housing Stability: High Risk (8/24/2024)      Review of Systems  Objective:      Vital Signs (Most Recent):  Temp: 98.6 °F (37 °C) (08/31/24 0351)  Pulse: 76 (08/31/24 0351)  Resp: 18 (08/31/24 0351)  BP: 138/84 (08/31/24 0351)  SpO2: 96 % (08/31/24 0351) Vital Signs (24h Range):  Temp:  [98.6 °F (37 °C)-98.9 °F (37.2 °C)] 98.6 °F (37 °C)  Pulse:  [71-76] 76  Resp:  [16-20] 18  SpO2:  [95 %-99 %] 96 %  BP: (108-138)/(68-84) 138/84      Weight: 109.5 kg (241 lb 6.5 oz)  Body mass index is 44.15 kg/m².  No intake or output data in the 24 hours ending 08/31/24 0656      Physical Exam  Constitutional:       General: She is not in acute distress.  Cardiovascular:      Rate and Rhythm: Normal rate and regular rhythm.      Heart sounds: No murmur heard.  Pulmonary:      Effort: Pulmonary effort is normal. No  respiratory distress.      Breath sounds: Normal breath sounds.   Abdominal:      Tenderness: There is no abdominal tenderness.   Musculoskeletal:         General: Tenderness present.      Comments: Port at R side chest  Hematoma at L side chest is tender, unchanged from previous   Neurological:      Mental Status: She is alert. Mental status is at baseline.   Psychiatric:         Mood and Affect: Mood normal.      Consults:   Consults (From admission, onward)          Status Ordering Provider     Inpatient consult to General Surgery  Once        Provider:  (Not yet assigned)    SASCHA Brenner            No new Assessment & Plan notes have been filed under this hospital service since the last note was generated.  Service: Hospital Medicine    Final Active Diagnoses:    Diagnosis Date Noted POA    PRINCIPAL PROBLEM:  Sickle cell pain crisis [D57.00] 12/21/2020 Yes    Fluid collection at surgical site [T88.8XXA] 08/22/2024 Unknown    Constipation [K59.00] 08/22/2024 Unknown    History of pulmonary embolism [Z86.711] 06/08/2020 Yes    Anemia [D64.9] 08/22/2024 Unknown    Nausea and vomiting [R11.2] 08/22/2024 Unknown    Hypertension [I10] 04/16/2017 Yes    Anxiety [F41.9] 03/20/2018 Yes      Problems Resolved During this Admission:       Discharged Condition: stable    Disposition: Home or Self Care    Follow Up:    Patient Instructions:   No discharge procedures on file.    Pending Diagnostic Studies:       None           Medications:  Reconciled Home Medications:      Medication List        CHANGE how you take these medications      gabapentin 400 MG capsule  Commonly known as: NEURONTIN  Take 1 capsule (400 mg total) by mouth 2 (two) times daily.  What changed: when to take this     morphine 30 MG 12 hr tablet  Commonly known as: MS CONTIN  Take 1 tablet (30 mg total) by mouth every 12 (twelve) hours.  What changed:   medication strength  how much to take  when to take this            CONTINUE taking these  medications      acetaminophen 500 MG tablet  Commonly known as: TYLENOL  Take 1,000 mg by mouth daily as needed for Pain.     albuterol 90 mcg/actuation inhaler  Commonly known as: VENTOLIN HFA  Inhale 2 puffs into the lungs every 6 (six) hours as needed for Wheezing or Shortness of Breath. Rescue     amLODIPine 10 MG tablet  Commonly known as: NORVASC  Take 1 tablet (10 mg total) by mouth once daily.     carvediloL 25 MG tablet  Commonly known as: COREG  Take 1 tablet (25 mg total) by mouth 2 (two) times daily.     CONSTULOSE 10 gram/15 mL solution  Generic drug: lactulose  Take 30 mLs (20 g total) by mouth every 6 (six) hours as needed (Constipation).     enoxaparin 120 mg/0.8 mL Syrg  Commonly known as: LOVENOX  Push out 0.1 ml then Inject 0.7 mLs (105 mg total) into the skin 2 (two) times daily.     FLUoxetine 40 MG capsule  Take 1 capsule (40 mg total) by mouth once daily.     fluticasone propionate 50 mcg/actuation nasal spray  Commonly known as: FLONASE  INSTILL 1 SPRAY INTO EACH NOSTRIL EVERY DAY     folic acid 1 MG tablet  Commonly known as: FOLVITE  Take 1 tablet (1 mg total) by mouth once daily.     oxyCODONE-acetaminophen  mg per tablet  Commonly known as: PERCOCET  Take 1 tablet by mouth every 6 (six) hours as needed for Pain.     promethazine 25 MG tablet  Commonly known as: PHENERGAN  Take 1 tablet (25 mg total) by mouth every 6 (six) hours as needed for Nausea.     senna-docusate 8.6-50 mg 8.6-50 mg per tablet  Commonly known as: PERICOLACE  Take 1 tablet by mouth daily as needed for Constipation.     tiZANidine 4 MG tablet  Commonly known as: ZANAFLEX  Take 1 tablet (4 mg total) by mouth every 8 (eight) hours as needed.     VITAMIN C ORAL  Take 1 capsule by mouth once daily.            STOP taking these medications      cephALEXin 500 MG capsule  Commonly known as: KEFLEX     furosemide 20 MG tablet  Commonly known as: LASIX              Indwelling Lines/Drains at time of discharge:    Lines/Drains/Airways       Central Venous Catheter Line  Duration             Port A Cath Single Lumen 07/28/24 0830 Subclavian Right 34 days                    Time spent on the discharge of patient: 60 minutes         Lin Torrez MD  Department of Hospital Medicine  [facility] - Internal Medicine Telemetry

## 2024-09-01 NOTE — PLAN OF CARE
[facility] - Internal Medicine Telemetry  Discharge Final Note    Primary Care Provider: Pee Montgomery MD    Expected Discharge Date: 8/31/2024    Final Discharge Note (most recent)       Final Note - 09/01/24 0909          Final Note    Assessment Type Final Discharge Note (P)      Anticipated Discharge Disposition Home or Self Care (P)      What phone number can be called within the next 1-3 days to see how you are doing after discharge? [phone] (P)         Post-Acute Status    Post-Acute Authorization Other (P)      Coverage [employer] MANAGED MEDICARE (P)      Other Status Awaiting f/u Appts (P)                    What's Next  What's Next  SEP    23 New Patient with Vijaya Soni MD  Monday Sep 23, 2024 3:00 PM [facility] - Benign Hematology  [address] [facility] [id number]  [phone]       Important Message from Medicare           Patient discharged home w/ family via personal vehicle.                      ADRIEN Lehman, LMSW  [facility]  Case Management  Ext. [phone]

## 2024-09-13 ENCOUNTER — HOSPITAL ENCOUNTER (INPATIENT)
Facility: HOSPITAL | Age: 44
LOS: 5 days | Discharge: HOME | DRG: 812 | End: 2024-09-19
Attending: EMERGENCY MEDICINE
Payer: MEDICARE
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
## 2024-09-13 DIAGNOSIS — R53.1 LEFT-SIDED WEAKNESS: ICD-10-CM

## 2024-09-13 DIAGNOSIS — E66.01 MORBID OBESITY: ICD-10-CM

## 2024-09-13 DIAGNOSIS — M79.669 LOWER LEG PAIN: ICD-10-CM

## 2024-09-13 DIAGNOSIS — M79.651 ACUTE THIGH PAIN, RIGHT: ICD-10-CM

## 2024-09-13 DIAGNOSIS — R07.9 ACUTE CHEST PAIN: ICD-10-CM

## 2024-09-13 DIAGNOSIS — D57.00 SICKLE CELL PAIN CRISIS: ICD-10-CM

## 2024-09-13 DIAGNOSIS — R07.9 CHEST PAIN: ICD-10-CM

## 2024-09-13 LAB
ALBUMIN SERPL BCP-MCNC: 3.5 G/DL (ref 3.5–5.2)
ALP SERPL-CCNC: 60 U/L (ref 55–135)
ALT SERPL W/O P-5'-P-CCNC: 15 U/L (ref 10–44)
ANION GAP SERPL CALC-SCNC: 8 MMOL/L (ref 8–16)
AST SERPL-CCNC: 26 U/L (ref 10–40)
BASOPHILS # BLD AUTO: 0.03 K/UL (ref 0–0.2)
BASOPHILS NFR BLD: 0.3 % (ref 0–1.9)
BILIRUB SERPL-MCNC: 0.7 MG/DL (ref 0.1–1)
BNP SERPL-MCNC: 57 PG/ML (ref 0–99)
BUN SERPL-MCNC: 7 MG/DL (ref 6–20)
CALCIUM SERPL-MCNC: 9.4 MG/DL (ref 8.7–10.5)
CHLORIDE SERPL-SCNC: 106 MMOL/L (ref 95–110)
CO2 SERPL-SCNC: 27 MMOL/L (ref 23–29)
CREAT SERPL-MCNC: 0.9 MG/DL (ref 0.5–1.4)
DIFFERENTIAL METHOD BLD: ABNORMAL
EOSINOPHIL # BLD AUTO: 0.4 K/UL (ref 0–0.5)
EOSINOPHIL NFR BLD: 4 % (ref 0–8)
ERYTHROCYTE [DISTWIDTH] IN BLOOD BY AUTOMATED COUNT: 20.6 % (ref 11.5–14.5)
EST. GFR  (NO RACE VARIABLE): >60 ML/MIN/1.73 M^2
GLUCOSE SERPL-MCNC: 82 MG/DL (ref 70–110)
HCT VFR BLD AUTO: 28.7 % (ref 37–48.5)
HGB BLD-MCNC: 9.7 G/DL (ref 12–16)
IMM GRANULOCYTES # BLD AUTO: 0.05 K/UL (ref 0–0.04)
IMM GRANULOCYTES NFR BLD AUTO: 0.5 % (ref 0–0.5)
LYMPHOCYTES # BLD AUTO: 3.3 K/UL (ref 1–4.8)
LYMPHOCYTES NFR BLD: 31.5 % (ref 18–48)
MAGNESIUM SERPL-MCNC: 1.7 MG/DL (ref 1.6–2.6)
MCH RBC QN AUTO: 25.3 PG (ref 27–31)
MCHC RBC AUTO-ENTMCNC: 33.8 G/DL (ref 32–36)
MCV RBC AUTO: 75 FL (ref 82–98)
MONOCYTES # BLD AUTO: 1.4 K/UL (ref 0.3–1)
MONOCYTES NFR BLD: 13.2 % (ref 4–15)
NEUTROPHILS # BLD AUTO: 5.2 K/UL (ref 1.8–7.7)
NEUTROPHILS NFR BLD: 50.5 % (ref 38–73)
NRBC BLD-RTO: 4 /100 WBC
PLATELET # BLD AUTO: 220 K/UL (ref 150–450)
PMV BLD AUTO: 10.8 FL (ref 9.2–12.9)
POTASSIUM SERPL-SCNC: 3.3 MMOL/L (ref 3.5–5.1)
PROT SERPL-MCNC: 7.1 G/DL (ref 6–8.4)
RBC # BLD AUTO: 3.84 M/UL (ref 4–5.4)
RETICS/RBC NFR AUTO: 2.3 % (ref 0.5–2.5)
SODIUM SERPL-SCNC: 141 MMOL/L (ref 136–145)
TROPONIN I SERPL DL<=0.01 NG/ML-MCNC: <0.006 NG/ML (ref 0–0.03)
WBC # BLD AUTO: 10.33 K/UL (ref 3.9–12.7)

## 2024-09-13 PROCEDURE — 99285 EMERGENCY DEPT VISIT HI MDM: CPT | Mod: 25

## 2024-09-13 PROCEDURE — G0378 HOSPITAL OBSERVATION PER HR: HCPCS

## 2024-09-13 PROCEDURE — 83735 ASSAY OF MAGNESIUM: CPT | Performed by: EMERGENCY MEDICINE

## 2024-09-13 PROCEDURE — 63600175 PHARM REV CODE 636 W HCPCS: Performed by: EMERGENCY MEDICINE

## 2024-09-13 PROCEDURE — 63600175 PHARM REV CODE 636 W HCPCS: Performed by: NURSE PRACTITIONER

## 2024-09-13 PROCEDURE — 93010 ELECTROCARDIOGRAM REPORT: CPT | Mod: ,,, | Performed by: INTERNAL MEDICINE

## 2024-09-13 PROCEDURE — 25000003 PHARM REV CODE 250: Performed by: NURSE PRACTITIONER

## 2024-09-13 PROCEDURE — 84484 ASSAY OF TROPONIN QUANT: CPT | Performed by: EMERGENCY MEDICINE

## 2024-09-13 PROCEDURE — 93005 ELECTROCARDIOGRAM TRACING: CPT

## 2024-09-13 PROCEDURE — 85045 AUTOMATED RETICULOCYTE COUNT: CPT | Performed by: EMERGENCY MEDICINE

## 2024-09-13 PROCEDURE — 83880 ASSAY OF NATRIURETIC PEPTIDE: CPT | Performed by: EMERGENCY MEDICINE

## 2024-09-13 PROCEDURE — 80053 COMPREHEN METABOLIC PANEL: CPT | Performed by: EMERGENCY MEDICINE

## 2024-09-13 PROCEDURE — 96374 THER/PROPH/DIAG INJ IV PUSH: CPT

## 2024-09-13 PROCEDURE — 85025 COMPLETE CBC W/AUTO DIFF WBC: CPT | Performed by: EMERGENCY MEDICINE

## 2024-09-13 PROCEDURE — 96367 TX/PROPH/DG ADDL SEQ IV INF: CPT

## 2024-09-13 PROCEDURE — 96375 TX/PRO/DX INJ NEW DRUG ADDON: CPT

## 2024-09-13 RX ORDER — FLUOXETINE HYDROCHLORIDE 20 MG/1
40 CAPSULE ORAL DAILY
Status: DISCONTINUED | OUTPATIENT
Start: 2024-09-14 | End: 2024-09-19 | Stop reason: HOSPADM

## 2024-09-13 RX ORDER — IBUPROFEN 200 MG
16 TABLET ORAL
Status: DISCONTINUED | OUTPATIENT
Start: 2024-09-14 | End: 2024-09-19 | Stop reason: HOSPADM

## 2024-09-13 RX ORDER — AMLODIPINE BESYLATE 10 MG/1
10 TABLET ORAL DAILY
Status: DISCONTINUED | OUTPATIENT
Start: 2024-09-14 | End: 2024-09-19 | Stop reason: HOSPADM

## 2024-09-13 RX ORDER — PROCHLORPERAZINE EDISYLATE 5 MG/ML
5 INJECTION INTRAMUSCULAR; INTRAVENOUS
Status: COMPLETED | OUTPATIENT
Start: 2024-09-13 | End: 2024-09-13

## 2024-09-13 RX ORDER — ACETAMINOPHEN 500 MG
1000 TABLET ORAL EVERY 8 HOURS
Status: DISCONTINUED | OUTPATIENT
Start: 2024-09-13 | End: 2024-09-19 | Stop reason: HOSPADM

## 2024-09-13 RX ORDER — LACTULOSE 10 G/15ML
20 SOLUTION ORAL NIGHTLY
Status: DISCONTINUED | OUTPATIENT
Start: 2024-09-13 | End: 2024-09-19 | Stop reason: HOSPADM

## 2024-09-13 RX ORDER — TIZANIDINE 4 MG/1
4 TABLET ORAL EVERY 8 HOURS
Status: DISCONTINUED | OUTPATIENT
Start: 2024-09-14 | End: 2024-09-14

## 2024-09-13 RX ORDER — DIPHENHYDRAMINE HYDROCHLORIDE 50 MG/ML
25 INJECTION INTRAMUSCULAR; INTRAVENOUS EVERY 6 HOURS PRN
Status: DISCONTINUED | OUTPATIENT
Start: 2024-09-13 | End: 2024-09-15

## 2024-09-13 RX ORDER — HYDROMORPHONE HYDROCHLORIDE 1 MG/ML
1 INJECTION, SOLUTION INTRAMUSCULAR; INTRAVENOUS; SUBCUTANEOUS
Status: COMPLETED | OUTPATIENT
Start: 2024-09-13 | End: 2024-09-13

## 2024-09-13 RX ORDER — IBUPROFEN 200 MG
24 TABLET ORAL
Status: DISCONTINUED | OUTPATIENT
Start: 2024-09-14 | End: 2024-09-19 | Stop reason: HOSPADM

## 2024-09-13 RX ORDER — FOLIC ACID 1 MG/1
1 TABLET ORAL DAILY
Status: DISCONTINUED | OUTPATIENT
Start: 2024-09-14 | End: 2024-09-19 | Stop reason: HOSPADM

## 2024-09-13 RX ORDER — HYDROMORPHONE HYDROCHLORIDE 1 MG/ML
2 INJECTION, SOLUTION INTRAMUSCULAR; INTRAVENOUS; SUBCUTANEOUS EVERY 4 HOURS PRN
Status: DISCONTINUED | OUTPATIENT
Start: 2024-09-13 | End: 2024-09-14

## 2024-09-13 RX ORDER — NALOXONE HCL 0.4 MG/ML
0.02 VIAL (ML) INJECTION
Status: DISCONTINUED | OUTPATIENT
Start: 2024-09-14 | End: 2024-09-19 | Stop reason: HOSPADM

## 2024-09-13 RX ORDER — METHOCARBAMOL 750 MG/1
750 TABLET, FILM COATED ORAL 4 TIMES DAILY
Status: DISCONTINUED | OUTPATIENT
Start: 2024-09-13 | End: 2024-09-13

## 2024-09-13 RX ORDER — ONDANSETRON HYDROCHLORIDE 2 MG/ML
4 INJECTION, SOLUTION INTRAVENOUS EVERY 8 HOURS PRN
Status: DISCONTINUED | OUTPATIENT
Start: 2024-09-13 | End: 2024-09-19 | Stop reason: HOSPADM

## 2024-09-13 RX ORDER — NALOXONE HCL 0.4 MG/ML
0.02 VIAL (ML) INJECTION
OUTPATIENT
Start: 2024-09-13

## 2024-09-13 RX ORDER — TIZANIDINE 2 MG/1
4 TABLET ORAL EVERY 8 HOURS PRN
Status: DISCONTINUED | OUTPATIENT
Start: 2024-09-13 | End: 2024-09-13

## 2024-09-13 RX ORDER — GLUCAGON 1 MG
1 KIT INJECTION
Status: DISCONTINUED | OUTPATIENT
Start: 2024-09-14 | End: 2024-09-19 | Stop reason: HOSPADM

## 2024-09-13 RX ORDER — AMOXICILLIN 250 MG
1 CAPSULE ORAL DAILY PRN
Status: DISCONTINUED | OUTPATIENT
Start: 2024-09-13 | End: 2024-09-19 | Stop reason: HOSPADM

## 2024-09-13 RX ORDER — MORPHINE SULFATE 30 MG/1
30 TABLET, FILM COATED, EXTENDED RELEASE ORAL EVERY 12 HOURS
Status: DISCONTINUED | OUTPATIENT
Start: 2024-09-13 | End: 2024-09-19 | Stop reason: HOSPADM

## 2024-09-13 RX ORDER — DIPHENHYDRAMINE HYDROCHLORIDE 50 MG/ML
25 INJECTION INTRAMUSCULAR; INTRAVENOUS ONCE
Status: COMPLETED | OUTPATIENT
Start: 2024-09-13 | End: 2024-09-13

## 2024-09-13 RX ORDER — IBUPROFEN 200 MG
16 TABLET ORAL
Status: DISCONTINUED | OUTPATIENT
Start: 2024-09-13 | End: 2024-09-16 | Stop reason: SDUPTHER

## 2024-09-13 RX ORDER — GABAPENTIN 400 MG/1
400 CAPSULE ORAL 2 TIMES DAILY
Status: DISCONTINUED | OUTPATIENT
Start: 2024-09-13 | End: 2024-09-19 | Stop reason: HOSPADM

## 2024-09-13 RX ORDER — MAGNESIUM SULFATE 1 G/100ML
1 INJECTION INTRAVENOUS ONCE
Status: COMPLETED | OUTPATIENT
Start: 2024-09-13 | End: 2024-09-14

## 2024-09-13 RX ORDER — HYDROMORPHONE HYDROCHLORIDE 1 MG/ML
2 INJECTION, SOLUTION INTRAMUSCULAR; INTRAVENOUS; SUBCUTANEOUS
Status: COMPLETED | OUTPATIENT
Start: 2024-09-13 | End: 2024-09-13

## 2024-09-13 RX ORDER — ENOXAPARIN SODIUM 150 MG/ML
110 INJECTION SUBCUTANEOUS 2 TIMES DAILY
Status: DISCONTINUED | OUTPATIENT
Start: 2024-09-13 | End: 2024-09-15

## 2024-09-13 RX ORDER — TIZANIDINE 2 MG/1
4 TABLET ORAL ONCE
Status: COMPLETED | OUTPATIENT
Start: 2024-09-13 | End: 2024-09-13

## 2024-09-13 RX ORDER — CARVEDILOL 25 MG/1
25 TABLET ORAL 2 TIMES DAILY
Status: DISCONTINUED | OUTPATIENT
Start: 2024-09-13 | End: 2024-09-19 | Stop reason: HOSPADM

## 2024-09-13 RX ORDER — ALBUTEROL SULFATE 90 UG/1
2 INHALANT RESPIRATORY (INHALATION) EVERY 6 HOURS PRN
Status: DISCONTINUED | OUTPATIENT
Start: 2024-09-13 | End: 2024-09-19 | Stop reason: HOSPADM

## 2024-09-13 RX ORDER — AMOXICILLIN 250 MG
1 CAPSULE ORAL 2 TIMES DAILY
Status: DISCONTINUED | OUTPATIENT
Start: 2024-09-14 | End: 2024-09-19 | Stop reason: HOSPADM

## 2024-09-13 RX ORDER — SODIUM CHLORIDE 0.9 % (FLUSH) 0.9 %
10 SYRINGE (ML) INJECTION EVERY 12 HOURS PRN
Status: DISCONTINUED | OUTPATIENT
Start: 2024-09-14 | End: 2024-09-19 | Stop reason: HOSPADM

## 2024-09-13 RX ORDER — DIPHENHYDRAMINE HYDROCHLORIDE 50 MG/ML
25 INJECTION INTRAMUSCULAR; INTRAVENOUS
Status: COMPLETED | OUTPATIENT
Start: 2024-09-13 | End: 2024-09-13

## 2024-09-13 RX ORDER — DEXTROSE MONOHYDRATE AND SODIUM CHLORIDE 5; .45 G/100ML; G/100ML
INJECTION, SOLUTION INTRAVENOUS CONTINUOUS
Status: ACTIVE | OUTPATIENT
Start: 2024-09-13 | End: 2024-09-14

## 2024-09-13 RX ORDER — FLUTICASONE PROPIONATE 50 MCG
1 SPRAY, SUSPENSION (ML) NASAL DAILY
Status: DISCONTINUED | OUTPATIENT
Start: 2024-09-14 | End: 2024-09-19 | Stop reason: HOSPADM

## 2024-09-13 RX ORDER — IBUPROFEN 200 MG
24 TABLET ORAL
Status: DISCONTINUED | OUTPATIENT
Start: 2024-09-13 | End: 2024-09-16 | Stop reason: SDUPTHER

## 2024-09-13 RX ORDER — GLUCAGON 1 MG
1 KIT INJECTION
Status: DISCONTINUED | OUTPATIENT
Start: 2024-09-13 | End: 2024-09-19 | Stop reason: HOSPADM

## 2024-09-13 RX ADMIN — TIZANIDINE 4 MG: 2 TABLET ORAL at 10:09

## 2024-09-13 RX ADMIN — PROCHLORPERAZINE EDISYLATE 5 MG: 5 INJECTION INTRAMUSCULAR; INTRAVENOUS at 04:09

## 2024-09-13 RX ADMIN — HYDROMORPHONE HYDROCHLORIDE 1 MG: 1 INJECTION, SOLUTION INTRAMUSCULAR; INTRAVENOUS; SUBCUTANEOUS at 05:09

## 2024-09-13 RX ADMIN — HYDROMORPHONE HYDROCHLORIDE 2 MG: 1 INJECTION, SOLUTION INTRAMUSCULAR; INTRAVENOUS; SUBCUTANEOUS at 06:09

## 2024-09-13 RX ADMIN — GABAPENTIN 400 MG: 400 CAPSULE ORAL at 11:09

## 2024-09-13 RX ADMIN — POTASSIUM BICARBONATE 20 MEQ: 391 TABLET, EFFERVESCENT ORAL at 11:09

## 2024-09-13 RX ADMIN — HYDROMORPHONE HYDROCHLORIDE 1 MG: 1 INJECTION, SOLUTION INTRAMUSCULAR; INTRAVENOUS; SUBCUTANEOUS at 04:09

## 2024-09-13 RX ADMIN — HYDROMORPHONE HYDROCHLORIDE 2 MG: 1 INJECTION, SOLUTION INTRAMUSCULAR; INTRAVENOUS; SUBCUTANEOUS at 09:09

## 2024-09-13 RX ADMIN — DIPHENHYDRAMINE HYDROCHLORIDE 25 MG: 50 INJECTION, SOLUTION INTRAMUSCULAR; INTRAVENOUS at 10:09

## 2024-09-13 RX ADMIN — CARVEDILOL 25 MG: 25 TABLET, FILM COATED ORAL at 11:09

## 2024-09-13 RX ADMIN — ACETAMINOPHEN 1000 MG: 500 TABLET ORAL at 11:09

## 2024-09-13 RX ADMIN — DIPHENHYDRAMINE HYDROCHLORIDE 25 MG: 50 INJECTION, SOLUTION INTRAMUSCULAR; INTRAVENOUS at 04:09

## 2024-09-13 NOTE — ED PROVIDER NOTES
Emergency Department Provider Note    Nazanin Malone   44 y.o. female   [id number]      2024       History     This history was obtained from the patient without limitations.  She presented by ambulance.      She is a 44-year-old with the below past medical history.  She had a mechanical fall yesterday resulting in trauma to her right upper and lower leg and her left lower leg.  She was having severe pain at these areas that is not improved with oral morphine and Percocet which she takes for sickle cell disease.  She also complains of fever, chest discomfort with breathing, and shortness of breath that began yesterday as well.  She denies cough, headache, dizziness, abdominal pain, nausea, vomiting, and diarrhea.         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    Asthma     Depression     Hypertension     Morbid obesity     Opioid dependence 2017    Pneumonia due to Streptococcus pneumoniae 2017    Right lower lobe pneumonia 2017    Sepsis due to Streptococcus pneumoniae 2017    Sickle cell-beta thalassemia disease with pain     Trigeminal neuralgia       Past Surgical History:   Procedure Laterality Date     SECTION      TONSILLECTOMY      TUBAL LIGATION        Family History   Problem Relation Name Age of Onset    Heart disease Mother      Diabetes Mother      Heart disease Father      Breast cancer Neg Hx      Ovarian cancer Neg Hx      Colon cancer Neg Hx        Social History     Socioeconomic History    Marital status: Single   Tobacco Use    Smoking status: Never    Smokeless tobacco: Never   Substance and Sexual Activity    Alcohol use: No    Drug use: Yes     Types: Marijuana     Comment: periodically     Sexual activity: Not Currently     Birth control/protection: See Surgical Hx     Social Determinants of Health     Financial Resource Strain: Medium Risk  (8/24/2024)    Overall Financial Resource Strain (CARDIA)     Difficulty of Paying Living Expenses: Somewhat hard   Food Insecurity: No Food Insecurity (8/24/2024)    Hunger Vital Sign     Worried About Running Out of Food in the Last Year: Never true     Ran Out of Food in the Last Year: Never true   Transportation Needs: No Transportation Needs (8/24/2024)    TRANSPORTATION NEEDS     Transportation : No   Physical Activity: Inactive (8/23/2024)    Exercise Vital Sign     Days of Exercise per Week: 0 days     Minutes of Exercise per Session: 0 min   Stress: Stress Concern Present (8/24/2024)    [employer] - Occupational Stress Questionnaire     Feeling of Stress : Rather much   Housing Stability: High Risk (8/24/2024)    Housing Stability Vital Sign     Unable to Pay for Housing in the Last Year: Yes     Homeless in the Last Year: Yes      Review of patient's allergies indicates:   Allergen Reactions    Nsaids (non-steroidal anti-inflammatory drug) Itching and Anaphylaxis           Physical Examination     Initial Vitals [09/13/24 1437]   BP Pulse Resp Temp SpO2   126/66 93 18 97.6 °F (36.4 °C) 98 %      MAP       --           Physical Exam    Nursing note and vitals reviewed.  Constitutional: She is not diaphoretic. No distress.   Eyes: Conjunctivae are normal. No scleral icterus.   Cardiovascular:  Normal rate, regular rhythm and normal heart sounds.     Exam reveals no gallop and no friction rub.       No murmur heard.  Pulmonary/Chest: No respiratory distress. She has no wheezes. She has no rhonchi. She has no rales.   Abdominal: Abdomen is soft. There is no abdominal tenderness.   Musculoskeletal:         General: No edema.        Legs:       Comments: No calf swelling or tenderness bilaterally.     Neurological: She is alert and oriented to person, place, and time. GCS score is 15. GCS eye subscore is 4. GCS verbal subscore is 5. GCS motor subscore is 6.   Skin: Skin is warm and  dry. No pallor.            Labs     Labs Reviewed   CBC W/ AUTO DIFFERENTIAL - Abnormal       Result Value    WBC 10.33      RBC 3.84 (*)     Hemoglobin 9.7 (*)     Hematocrit 28.7 (*)     MCV 75 (*)     MCH 25.3 (*)     MCHC 33.8      RDW 20.6 (*)     Platelets 220      MPV 10.8      Immature Granulocytes 0.5      Gran # (ANC) 5.2      Immature Grans (Abs) 0.05 (*)     Lymph # 3.3      Mono # 1.4 (*)     Eos # 0.4      Baso # 0.03      nRBC 4 (*)     Gran % 50.5      Lymph % 31.5      Mono % 13.2      Eosinophil % 4.0      Basophil % 0.3      Differential Method Automated     COMPREHENSIVE METABOLIC PANEL - Abnormal    Sodium 141      Potassium 3.3 (*)     Chloride 106      CO2 27      Glucose 82      BUN 7      Creatinine 0.9      Calcium 9.4      Total Protein 7.1      Albumin 3.5      Total Bilirubin 0.7      Alkaline Phosphatase 60      AST 26      ALT 15      eGFR >60.0      Anion Gap 8     TROPONIN I    Troponin I <0.006     B-TYPE NATRIURETIC PEPTIDE    BNP 57     RETICULOCYTES    Retic 2.3     MAGNESIUM   MAGNESIUM   MAGNESIUM    Magnesium 1.7      Narrative:     ADD ON MAGNESIUM PER RHONA MUKHERJEE,NP/ORDER# [id number] @ 21:46   URINALYSIS, REFLEX TO URINE CULTURE   POCT URINE PREGNANCY        Imaging     Imaging Results              X-Ray Ankle Complete Left (Final result)  Result time 09/13/24 23:31:02      Final result by Romel Coffey MD (09/13/24 23:31:02)                   Impression:      Moderate ankle soft tissue edema, asymmetric along the medial ankle.  Age-indeterminate punctate calcification adjacent to the medial malleolus, potentially a chronic finding or related to small avulsion fracture.  Suggest correlation with mechanism of injury and point tenderness.    Otherwise, no acute displaced fracture.      Electronically signed by: Romel Coffey MD  Date:    09/13/2024  Time:    23:31               Narrative:    EXAMINATION:  XR ANKLE COMPLETE 3 VIEW LEFT    CLINICAL HISTORY:  s/p  fall;    TECHNIQUE:  AP, lateral and oblique views of the left ankle were performed.    COMPARISON:  None.    FINDINGS:  Age-indeterminate punctate calcification adjacent to the inferior aspect of the medial malleolus.  Otherwise, no acute displaced fracture.  No dislocation.  Moderate soft tissue edema overlying the medial aspect of the ankle, with mild soft tissue edema along the lateral aspect of the ankle.  No unexpected radiopaque foreign body.  Small plantar calcaneal spur.                                       X-Ray Chest AP Portable (Final result)  Result time 09/13/24 18:51:31      Final result by Kd Warren MD (09/13/24 18:51:31)                   Impression:      1. Interstitial findings are accentuated by habitus, no large focal consolidation.      Electronically signed by: Kd Warren MD  Date:    09/13/2024  Time:    18:51               Narrative:    EXAMINATION:  XR CHEST AP PORTABLE    CLINICAL HISTORY:  Acute chest pain;    TECHNIQUE:  Single frontal view of the chest was performed.    COMPARISON:  08/22/2024    FINDINGS:  Right chest wall port catheter tip projects over the distal SVC.  The cardiomediastinal silhouette is not enlarged..  There is no pleural effusion.  The trachea is midline.  The lungs are symmetrically expanded bilaterally with coarse interstitial attenuation, accentuated by habitus..  No large focal consolidation seen.  There is no pneumothorax.  The osseous structures are remarkable for degenerative change..                                       X-Ray Femur Ap/Lat Right (Final result)  Result time 09/13/24 19:41:58      Final result by Kd Warren MD (09/13/24 19:41:58)                   Impression:      1. Subtle cortical irregularity at the level of the proximal femoral metaphysis, not confirmed on the orthogonal views.  Correlation with any focal tenderness recommended.      Electronically signed by: Kd Warren MD  Date:    09/13/2024  Time:    19:41                Narrative:    EXAMINATION:  XR FEMUR 2 VIEW RIGHT    CLINICAL HISTORY:  Pain in right thigh    TECHNIQUE:  AP and lateral views of the right femur were performed.    COMPARISON:  None    FINDINGS:  Four views right femur.    The right femoral head maintains appropriate relationship with the glenoid.  The acromial sclerotic that the sacroiliac joints are intact.  The pubic symphysis is intact.  There are degenerative changes of the knee.  No large knee joint effusion.  On the frontal view, there is questionable cortical irregularity involving the right femoral metaphysis, not confirmed on the orthogonal views.                                       XR Tibia Fibula 2 View Bilateral (Final result)  Result time 09/13/24 19:43:27      Final result by Kd Warren MD (09/13/24 19:43:27)                   Impression:      1. No convincing acute displaced fracture or dislocation of the left or right tibia/fibula.  2. Edema overlies the proximal aspect of the right tibia.      Electronically signed by: Kd Warren MD  Date:    09/13/2024  Time:    19:43               Narrative:    EXAMINATION:  XR TIBIA FIBULA 2 VIEW BILATERAL    CLINICAL HISTORY:  Pain in unspecified lower leg    COMPARISON:  None    FINDINGS:  Four views bilateral tibia fibula.    There are degenerative changes of the knees, right greater than left.  The right ankle mortise is intact.  The left ankle mortise is intact.  No convincing acute displaced fracture or dislocation of the left or right tibia/fibula.  There are degenerative changes/posttraumatic changes of the superior pole of the right patella.  Edema overlies the proximal aspect of the right tibia.                                        ED Course     The patient received the following medications:  Medications   HYDROmorphone injection 2 mg (2 mg Intravenous Given 9/14/24 1217)   HYDROmorphone injection 1 mg (1 mg Intravenous Given 9/13/24 1654)   diphenhydrAMINE injection  25 mg (25 mg Intravenous Given 9/13/24 1654)   prochlorperazine injection Soln 5 mg (5 mg Intravenous Given 9/13/24 1654)   HYDROmorphone injection 1 mg (1 mg Intravenous Given 9/13/24 1734)   HYDROmorphone injection 2 mg (2 mg Intravenous Given 9/13/24 1840)   HYDROmorphone injection 2 mg (2 mg Intravenous Given 9/13/24 2124)       ED Course as of 09/14/24 1306   Fri Sep 13, 2024   1836 The patient is requesting an increased dose of dilaudid. [LP]   1838 EKG 12-lead  Independently interpreted by me:   Normal sinus rhythm.  Ventricular rate 87 beats per minute.  Normal axis.  Normal QRS duration.  Prolonged QTc interval (478 milliseconds).  No ST segment elevation or depression.  Normal T-wave morphology. [LP]   1917 CBC auto differential(!) [LP]   1917 Hemoglobin(!): 9.7 [LP]   1918 Hematocrit(!): 28.7 [LP]   1918 WBC: 10.33 [LP]   1918 Platelet Count: 220 [LP]   1918 Gran %: 50.5 [LP]   1918 Lymph %: 31.5 [LP]   1918 Comprehensive metabolic panel(!) [LP]   1918 Potassium(!): 3.3 [LP]   1918 Retic: 2.3 [LP]   1918 Troponin I: <0.006 [LP]   1918 BNP: 57 [LP]      ED Course User Index  [LP] Diego Rueda III, MD        Medical Decision Making                 Medical Decision Making  The patient underwent emergent evaluation for severe pain which is felt to be due to a combination mechanical fall resulting in trauma to the lower extremities as well as sickle cell disease.  She also complained of chest pain and shortness of breath. Her initial vital signs were normal and she did not appear to be in respiratory distress.  She had multiple areas of point tenderness to the lower extremities with palpable contusions.  X-ray were obtained and showed no acute traumatic findings.  There was no tenderness at the area of metaphyseal cortical abnormality at the right femur.  Regarding her chest pain, EKG was negative for arrhythmia and acute ischemic changes, chest x-ray was negative for acute processes, and there was no  elevation of troponin or BNP.  The patient had persistent pain despite repeated doses of IV Dilaudid and was, therefore, admitted to Hospital Medicine for monitoring and additional pain management.      Amount and/or Complexity of Data Reviewed  Labs: ordered. Decision-making details documented in ED Course.  Radiology: ordered.  ECG/medicine tests:  Decision-making details documented in ED Course.    Risk  Prescription drug management.              Diagnoses       ICD-10-CM ICD-9-CM   1. Acute chest pain  R07.9 786.50   2. Acute thigh pain, right  M79.651 729.5   3. Lower leg pain  M79.669 729.5   4. Sickle cell pain crisis  D57.00 282.62   5. Chest pain  R07.9 786.50         Dispostion      ED Disposition Condition    Observation Stable             Diego Rueda III, MD  09/14/24 7682

## 2024-09-13 NOTE — ED TRIAGE NOTES
"Nazanin Malone, an 44 y.o. female presents to the ED via EMS with 10/10 L ankle pain & swelling secondary to fall. Pt reports hitting head in fall, +blood thinners, -LOC. Pt states "it feels like I am having a sickle cell crisis." Pt endorses HA. Denies CP, SOB, NVD. Reports hx of sickle cell, HTN.       Chief Complaint   Patient presents with    Leg Pain     Pt arrived via EMS reporting L ankle pain and swelling after mechanical fall. Pt reports also hitting her head, denies vision changes or head pain. +anticoagulants, -LOC. , 75mcg fentanyl given by EMS PTA     Review of patient's allergies indicates:   Allergen Reactions    Nsaids (non-steroidal anti-inflammatory drug) Itching and Anaphylaxis     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    Asthma     Depression     Hypertension     Morbid obesity     Opioid dependence 4/16/2017    Pneumonia due to Streptococcus pneumoniae 4/25/2017    Right lower lobe pneumonia 4/29/2017    Sepsis due to Streptococcus pneumoniae 4/25/2017    Sickle cell-beta thalassemia disease with pain     Trigeminal neuralgia        "
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
## 2024-09-14 PROBLEM — M25.572 ACUTE LEFT ANKLE PAIN: Status: ACTIVE | Noted: 2024-09-14

## 2024-09-14 LAB
ALBUMIN SERPL BCP-MCNC: 3.3 G/DL (ref 3.5–5.2)
ALP SERPL-CCNC: 62 U/L (ref 55–135)
ALT SERPL W/O P-5'-P-CCNC: 16 U/L (ref 10–44)
ANION GAP SERPL CALC-SCNC: 7 MMOL/L (ref 8–16)
AST SERPL-CCNC: 24 U/L (ref 10–40)
BASOPHILS # BLD AUTO: 0.04 K/UL (ref 0–0.2)
BASOPHILS NFR BLD: 0.4 % (ref 0–1.9)
BILIRUB SERPL-MCNC: 0.7 MG/DL (ref 0.1–1)
BUN SERPL-MCNC: 8 MG/DL (ref 6–20)
CALCIUM SERPL-MCNC: 9.7 MG/DL (ref 8.7–10.5)
CHLORIDE SERPL-SCNC: 107 MMOL/L (ref 95–110)
CO2 SERPL-SCNC: 28 MMOL/L (ref 23–29)
CREAT SERPL-MCNC: 1.1 MG/DL (ref 0.5–1.4)
DIFFERENTIAL METHOD BLD: ABNORMAL
EOSINOPHIL # BLD AUTO: 0.4 K/UL (ref 0–0.5)
EOSINOPHIL NFR BLD: 3.9 % (ref 0–8)
ERYTHROCYTE [DISTWIDTH] IN BLOOD BY AUTOMATED COUNT: 20.4 % (ref 11.5–14.5)
EST. GFR  (NO RACE VARIABLE): >60 ML/MIN/1.73 M^2
FACT X PPP CHRO-ACNC: 1.07 IU/ML (ref 0.3–0.7)
GLUCOSE SERPL-MCNC: 112 MG/DL (ref 70–110)
HCT VFR BLD AUTO: 25.7 % (ref 37–48.5)
HGB BLD-MCNC: 8.6 G/DL (ref 12–16)
IMM GRANULOCYTES # BLD AUTO: 0.03 K/UL (ref 0–0.04)
IMM GRANULOCYTES NFR BLD AUTO: 0.3 % (ref 0–0.5)
INFLUENZA A, MOLECULAR: NOT DETECTED
INFLUENZA B, MOLECULAR: NOT DETECTED
LYMPHOCYTES # BLD AUTO: 3.3 K/UL (ref 1–4.8)
LYMPHOCYTES NFR BLD: 35 % (ref 18–48)
MAGNESIUM SERPL-MCNC: 2.1 MG/DL (ref 1.6–2.6)
MCH RBC QN AUTO: 25.1 PG (ref 27–31)
MCHC RBC AUTO-ENTMCNC: 33.5 G/DL (ref 32–36)
MCV RBC AUTO: 75 FL (ref 82–98)
MONOCYTES # BLD AUTO: 1.4 K/UL (ref 0.3–1)
MONOCYTES NFR BLD: 14.7 % (ref 4–15)
NEUTROPHILS # BLD AUTO: 4.3 K/UL (ref 1.8–7.7)
NEUTROPHILS NFR BLD: 45.7 % (ref 38–73)
NRBC BLD-RTO: 4 /100 WBC
OHS QRS DURATION: 100 MS
OHS QTC CALCULATION: 478 MS
PLATELET # BLD AUTO: 239 K/UL (ref 150–450)
PMV BLD AUTO: 10.8 FL (ref 9.2–12.9)
POTASSIUM SERPL-SCNC: 3.4 MMOL/L (ref 3.5–5.1)
PROT SERPL-MCNC: 6.7 G/DL (ref 6–8.4)
RBC # BLD AUTO: 3.42 M/UL (ref 4–5.4)
RSV AG BY MOLECULAR METHOD: NOT DETECTED
SARS-COV-2 RNA RESP QL NAA+PROBE: NOT DETECTED
SODIUM SERPL-SCNC: 142 MMOL/L (ref 136–145)
WBC # BLD AUTO: 9.51 K/UL (ref 3.9–12.7)

## 2024-09-14 PROCEDURE — 85025 COMPLETE CBC W/AUTO DIFF WBC: CPT | Performed by: NURSE PRACTITIONER

## 2024-09-14 PROCEDURE — 25000003 PHARM REV CODE 250: Performed by: NURSE PRACTITIONER

## 2024-09-14 PROCEDURE — 83735 ASSAY OF MAGNESIUM: CPT | Performed by: NURSE PRACTITIONER

## 2024-09-14 PROCEDURE — 96372 THER/PROPH/DIAG INJ SC/IM: CPT | Performed by: NURSE PRACTITIONER

## 2024-09-14 PROCEDURE — 63600175 PHARM REV CODE 636 W HCPCS

## 2024-09-14 PROCEDURE — 0241U SARS-COV2 (COVID) WITH FLU/RSV BY PCR: CPT | Performed by: NURSE PRACTITIONER

## 2024-09-14 PROCEDURE — S5010 5% DEXTROSE AND 0.45% SALINE: HCPCS

## 2024-09-14 PROCEDURE — 63600175 PHARM REV CODE 636 W HCPCS: Performed by: NURSE PRACTITIONER

## 2024-09-14 PROCEDURE — 80053 COMPREHEN METABOLIC PANEL: CPT | Performed by: NURSE PRACTITIONER

## 2024-09-14 PROCEDURE — 25000003 PHARM REV CODE 250

## 2024-09-14 PROCEDURE — 21400001 HC TELEMETRY ROOM

## 2024-09-14 PROCEDURE — S5010 5% DEXTROSE AND 0.45% SALINE: HCPCS | Performed by: NURSE PRACTITIONER

## 2024-09-14 PROCEDURE — 85520 HEPARIN ASSAY: CPT

## 2024-09-14 PROCEDURE — 36415 COLL VENOUS BLD VENIPUNCTURE: CPT | Performed by: NURSE PRACTITIONER

## 2024-09-14 PROCEDURE — 25000242 PHARM REV CODE 250 ALT 637 W/ HCPCS: Performed by: NURSE PRACTITIONER

## 2024-09-14 RX ORDER — HYDROMORPHONE HYDROCHLORIDE 1 MG/ML
2 INJECTION, SOLUTION INTRAMUSCULAR; INTRAVENOUS; SUBCUTANEOUS ONCE
Status: COMPLETED | OUTPATIENT
Start: 2024-09-14 | End: 2024-09-14

## 2024-09-14 RX ORDER — OXYCODONE HYDROCHLORIDE 10 MG/1
20 TABLET ORAL EVERY 4 HOURS PRN
Status: DISCONTINUED | OUTPATIENT
Start: 2024-09-14 | End: 2024-09-15

## 2024-09-14 RX ORDER — METHOCARBAMOL 750 MG/1
750 TABLET, FILM COATED ORAL 4 TIMES DAILY
Status: DISCONTINUED | OUTPATIENT
Start: 2024-09-14 | End: 2024-09-14

## 2024-09-14 RX ORDER — HYDROMORPHONE HYDROCHLORIDE 1 MG/ML
2 INJECTION, SOLUTION INTRAMUSCULAR; INTRAVENOUS; SUBCUTANEOUS EVERY 4 HOURS PRN
Status: DISCONTINUED | OUTPATIENT
Start: 2024-09-14 | End: 2024-09-14

## 2024-09-14 RX ORDER — DEXTROSE MONOHYDRATE AND SODIUM CHLORIDE 5; .45 G/100ML; G/100ML
INJECTION, SOLUTION INTRAVENOUS CONTINUOUS
Status: ACTIVE | OUTPATIENT
Start: 2024-09-14 | End: 2024-09-15

## 2024-09-14 RX ORDER — TIZANIDINE 4 MG/1
4 TABLET ORAL EVERY 8 HOURS PRN
Status: DISCONTINUED | OUTPATIENT
Start: 2024-09-14 | End: 2024-09-19 | Stop reason: HOSPADM

## 2024-09-14 RX ORDER — HYDROMORPHONE HYDROCHLORIDE 1 MG/ML
3 INJECTION, SOLUTION INTRAMUSCULAR; INTRAVENOUS; SUBCUTANEOUS EVERY 4 HOURS PRN
Status: DISCONTINUED | OUTPATIENT
Start: 2024-09-14 | End: 2024-09-15

## 2024-09-14 RX ORDER — LIDOCAINE 50 MG/G
3 PATCH TOPICAL
Status: DISCONTINUED | OUTPATIENT
Start: 2024-09-14 | End: 2024-09-16

## 2024-09-14 RX ADMIN — HYDROMORPHONE HYDROCHLORIDE 2 MG: 1 INJECTION, SOLUTION INTRAMUSCULAR; INTRAVENOUS; SUBCUTANEOUS at 01:09

## 2024-09-14 RX ADMIN — DIPHENHYDRAMINE HYDROCHLORIDE 25 MG: 50 INJECTION, SOLUTION INTRAMUSCULAR; INTRAVENOUS at 02:09

## 2024-09-14 RX ADMIN — AMLODIPINE BESYLATE 10 MG: 10 TABLET ORAL at 08:09

## 2024-09-14 RX ADMIN — ENOXAPARIN SODIUM 105 MG: 120 INJECTION SUBCUTANEOUS at 12:09

## 2024-09-14 RX ADMIN — ENOXAPARIN SODIUM 105 MG: 120 INJECTION SUBCUTANEOUS at 08:09

## 2024-09-14 RX ADMIN — DEXTROSE AND SODIUM CHLORIDE: 5; 450 INJECTION, SOLUTION INTRAVENOUS at 02:09

## 2024-09-14 RX ADMIN — LIDOCAINE 3 PATCH: 50 PATCH CUTANEOUS at 03:09

## 2024-09-14 RX ADMIN — GABAPENTIN 400 MG: 400 CAPSULE ORAL at 08:09

## 2024-09-14 RX ADMIN — OXYCODONE HYDROCHLORIDE 15 MG: 10 TABLET ORAL at 03:09

## 2024-09-14 RX ADMIN — METHOCARBAMOL 750 MG: 750 TABLET ORAL at 01:09

## 2024-09-14 RX ADMIN — MORPHINE SULFATE 30 MG: 30 TABLET, FILM COATED, EXTENDED RELEASE ORAL at 12:09

## 2024-09-14 RX ADMIN — ACETAMINOPHEN 1000 MG: 500 TABLET ORAL at 09:09

## 2024-09-14 RX ADMIN — LACTULOSE 20 G: 20 SOLUTION ORAL at 08:09

## 2024-09-14 RX ADMIN — OXYCODONE HYDROCHLORIDE 20 MG: 10 TABLET ORAL at 08:09

## 2024-09-14 RX ADMIN — ACETAMINOPHEN 1000 MG: 500 TABLET ORAL at 01:09

## 2024-09-14 RX ADMIN — MORPHINE SULFATE 30 MG: 30 TABLET, FILM COATED, EXTENDED RELEASE ORAL at 09:09

## 2024-09-14 RX ADMIN — MAGNESIUM SULFATE 1 G: 500 INJECTION, SOLUTION INTRAMUSCULAR; INTRAVENOUS at 12:09

## 2024-09-14 RX ADMIN — MORPHINE SULFATE 30 MG: 30 TABLET, FILM COATED, EXTENDED RELEASE ORAL at 08:09

## 2024-09-14 RX ADMIN — HYDROMORPHONE HYDROCHLORIDE 3 MG: 1 INJECTION, SOLUTION INTRAMUSCULAR; INTRAVENOUS; SUBCUTANEOUS at 05:09

## 2024-09-14 RX ADMIN — HYDROMORPHONE HYDROCHLORIDE 3 MG: 1 INJECTION, SOLUTION INTRAMUSCULAR; INTRAVENOUS; SUBCUTANEOUS at 09:09

## 2024-09-14 RX ADMIN — HYDROMORPHONE HYDROCHLORIDE 2 MG: 1 INJECTION, SOLUTION INTRAMUSCULAR; INTRAVENOUS; SUBCUTANEOUS at 08:09

## 2024-09-14 RX ADMIN — DIPHENHYDRAMINE HYDROCHLORIDE 25 MG: 50 INJECTION, SOLUTION INTRAMUSCULAR; INTRAVENOUS at 08:09

## 2024-09-14 RX ADMIN — ACETAMINOPHEN 1000 MG: 500 TABLET ORAL at 05:09

## 2024-09-14 RX ADMIN — CARVEDILOL 25 MG: 25 TABLET, FILM COATED ORAL at 08:09

## 2024-09-14 RX ADMIN — SENNOSIDES AND DOCUSATE SODIUM 1 TABLET: 50; 8.6 TABLET ORAL at 08:09

## 2024-09-14 RX ADMIN — OXYCODONE HYDROCHLORIDE 15 MG: 10 TABLET ORAL at 11:09

## 2024-09-14 RX ADMIN — FLUTICASONE PROPIONATE 50 MCG: 50 SPRAY, METERED NASAL at 09:09

## 2024-09-14 RX ADMIN — FLUOXETINE HYDROCHLORIDE 40 MG: 20 CAPSULE ORAL at 08:09

## 2024-09-14 RX ADMIN — HYDROMORPHONE HYDROCHLORIDE 2 MG: 1 INJECTION, SOLUTION INTRAMUSCULAR; INTRAVENOUS; SUBCUTANEOUS at 12:09

## 2024-09-14 RX ADMIN — POTASSIUM BICARBONATE 25 MEQ: 978 TABLET, EFFERVESCENT ORAL at 05:09

## 2024-09-14 RX ADMIN — METHOCARBAMOL 750 MG: 750 TABLET ORAL at 08:09

## 2024-09-14 RX ADMIN — FOLIC ACID 1 MG: 1 TABLET ORAL at 08:09

## 2024-09-14 RX ADMIN — OXYCODONE HYDROCHLORIDE 15 MG: 10 TABLET ORAL at 05:09

## 2024-09-14 NOTE — PLAN OF CARE
[id number], pain medication given, fluids running at 100 ml/hr, bed in low position, bed locked, side  rails up X 2, and call light within reach.     Problem: Adult Inpatient Plan of Care  Goal: Plan of Care Review  Outcome: Progressing  Goal: Patient-Specific Goal (Individualized)  Outcome: Progressing  Goal: Absence of Hospital-Acquired Illness or Injury  Outcome: Progressing  Goal: Optimal Comfort and Wellbeing  Outcome: Progressing  Goal: Readiness for Transition of Care  Outcome: Progressing     Problem: Bariatric Environmental Safety  Goal: Safety Maintained with Care  Outcome: Progressing     Problem: Acute Kidney Injury/Impairment  Goal: Fluid and Electrolyte Balance  Outcome: Progressing  Goal: Improved Oral Intake  Outcome: Progressing  Goal: Effective Renal Function  Outcome: Progressing     Problem: Pneumonia  Goal: Fluid Balance  Outcome: Progressing  Goal: Resolution of Infection Signs and Symptoms  Outcome: Progressing  Goal: Effective Oxygenation and Ventilation  Outcome: Progressing     Problem: Infection  Goal: Absence of Infection Signs and Symptoms  Outcome: Progressing     Problem: Wound  Goal: Optimal Coping  Outcome: Progressing  Goal: Optimal Functional Ability  Outcome: Progressing  Goal: Absence of Infection Signs and Symptoms  Outcome: Progressing  Goal: Improved Oral Intake  Outcome: Progressing  Goal: Optimal Pain Control and Function  Outcome: Progressing  Goal: Skin Health and Integrity  Outcome: Progressing  Goal: Optimal Wound Healing  Outcome: Progressing

## 2024-09-14 NOTE — ASSESSMENT & PLAN NOTE
PMHx of Hypertension, Sickle cell-beta thalassemia disease with pain, and Trigeminal neuralgia who presents with L ankle/lower leg pain and swelling after a ground level fall that occurred yesterday morning. Patient is concerned she is having a sickle cell crisis, although pain appears related to her fall. Pt received  25mg IV benadryl and a total of 6mg IV dilaudid in the ED. Pt also notes fever and mild SOB at home.   - Afebrile, no leukocytosis.  - CXR negative for acute process.  - UA pending.  - Flu/RSV/COVID swab pending.  - Labs with Hb 9.7 (at baseline), Retic 2.3, TB 0.7.  - Start multimodial pain regimen - robaxin 750mg qid, tylenol 1,000mg tid, oxy 15 q4h PRN, dilaudid 2mg IV q4h PRN pain unrelieved by PO oxy, continue home gabapentin  - Continue folic acid   - Start D5 .45 NS at 75 ml/hr  - Monitor with daily CBC  - Senna-doc BID  - Benadryl and Zofran PRN  - Wean IV pain meds as tolerated to oral as pain diminishes

## 2024-09-14 NOTE — ASSESSMENT & PLAN NOTE
PMHx of Hypertension, Sickle cell-beta thalassemia disease with pain, and Trigeminal neuralgia who presents with L ankle/lower leg pain and swelling after a ground level fall that occurred yesterday morning. Patient is concerned she is having a sickle cell crisis, although pain appears related to her fall. Pt received  25mg IV benadryl and a total of 6mg IV dilaudid in the ED. Pt also notes fever and mild SOB at home.   - Afebrile, no leukocytosis.  - CXR negative for acute process.  - Flu/RSV/COVID negative  - Labs with Hb 9.7 (at baseline), Retic 2.3, TB 0.7.  - Start multimodial pain regimen - tizanidine 4mg tid prn, tylenol 1,000mg tid, oxy 15 q4h PRN, dilaudid 2mg IV q4h PRN pain unrelieved by PO oxy, continue home gabapentin  - Continue folic acid   - Start D5 .45 NS at 75 ml/hr  - Monitor with daily CBC  - Senna-doc BID  - Benadryl and Zofran PRN  - Wean IV pain meds as tolerated to oral as pain diminishes

## 2024-09-14 NOTE — ASSESSMENT & PLAN NOTE
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 Most recent hemoglobin and hematocrit are listed below.  Recent Labs     09/13/24  1742   HGB 9.7*   HCT 28.7*     Plan  - Monitor serial CBC: Daily  - Transfuse PRBC if patient becomes hemodynamically unstable, symptomatic or H/H drops below 7/21.  - Patient has not received any PRBC transfusions to date  - Patient's anemia is currently stable

## 2024-09-14 NOTE — ASSESSMENT & PLAN NOTE
Pt present with trauma to her right upper/lower leg and her left lower leg/ankle after a fall. She reports she was trying to prevent her mother with dementia from getting out of bed, but her mother pulled away causing her to fall forward. She also notes striking her head. Denies LOC, headache, or vision changes. She was having severe pain at these areas that is not improved with MS contin and Percocet which she takes for sickle cell disease. No obvious deformity on exam.    -Xray bilateral tib/fib with no convincing acute displaced fracture or dislocation of the left or right tibia/fibula. Edema overlies the proximal aspect of the right tibia.  -Xray R femur with subtle cortical irregularity at the level of the proximal femoral metaphysis, not confirmed on the orthogonal views.   -Xray L ankle with moderate ankle soft tissue edema, asymmetric along the medial ankle. Age-indeterminate punctate calcification adjacent to the medial malleolus, potentially a chronic finding or related to small avulsion fracture. Otherwise, no acute displaced fracture. Per discussion with Ortho, nothing to do.  -MM pain control with gabapentin, tylenol, tizanidine, and MS contin. Will attempt oral oxy IR with IV dilaudid for breakthrough pain.  -Fall precautions.  -ok to WBAT, ambulate independently or with CAM boot. Plan to consult PT/OT on Mon pending pain control.

## 2024-09-14 NOTE — ASSESSMENT & PLAN NOTE
Anemia is likely due to chronic disease due to sickle cell . Most recent hemoglobin and hematocrit are listed below.  Recent Labs     09/13/24  1742 09/14/24  0324   HGB 9.7* 8.6*   HCT 28.7* 25.7*     Plan  - Monitor serial CBC: Daily  - Transfuse PRBC if patient becomes hemodynamically unstable, symptomatic or H/H drops below 7/21.  - Patient has not received any PRBC transfusions to date  - Patient's anemia is currently stable  - See fall

## 2024-09-14 NOTE — ED NOTES
Telemetry Verification   Patient placed on Telemetry Box  Verified with War Room  Box # [id number]   Monitor Tech Shiraz   Rate 81   Rhythm Normal sinus

## 2024-09-14 NOTE — PROGRESS NOTES
[facility] Medicine  Progress Note    Patient Name: Nazanin Malone  MRN: [id number]  Patient Class: OP- Observation   Admission Date: 9/13/2024  Length of Stay: 0 days  Attending Physician: Shlomo Albert MD  Primary Care Provider: Pee Montgomery MD        Subjective:     Principal Problem:Fall        HPI:  Nazanin Malone is a 44 y.o. 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 She reports she was trying to prevent her mother with dementia from getting out of bed, but her mother pulled away causing her to fall forward. This resulted in pain to her right upper/lower leg and her left lower leg/ankle. She also notes striking her head. Denies LOC, headache, or vision changes. She was having severe pain to her legs that is not improved with MS contin and Percocet which she takes for sickle cell disease. The patient reports fever and mild SOB since yesterday but denies cough, chest pain, dizziness, N/V/D, or dysuria.    In the ED, VSS, afebrile. Hgb 9.7 (at baseline). Retic count 2.3. K+3.3. BNP 57. Troponin <0.006. EKG shows SR w/ non specific T wave abnormality, 87 bpm, prolonged QTc 478. UA pending. CXR with interstitial findings accentuated by habitus, no large focal consolidation. Xray bilateral tib/fib with no convincing acute displaced fracture or dislocation of the left or right tibia/fibula. Edema overlies the proximal aspect of the right tibia. Xray R femur with subtle cortical irregularity at the level of the proximal femoral metaphysis, not confirmed on the orthogonal views. The patient received compazine, IV benadryl, and a total of 6mg IV dilaudid.    Overview/Hospital Course:  Per Ortho review of imaging, small L medial malleolus avulsion, but uncertain of chronicity given no prior imaging. No  intervention indicated for this type of injury. WBAT and ambulation with CAM boot if it facilitates ambulation with less pain. Review of femoral metaphysis without concern for acute injury. Sickle cell pain crisis precipitated by mechanical fall treated with multimodal and opioid pain regimen in addition to home opioid regimen.     Interval History: Pt with acute pain, requiring intermittent one time doses in addition to current multimodal regimen.    Review of Systems   Constitutional:  Negative for activity change, appetite change and fever.   Respiratory:  Negative for cough, shortness of breath and wheezing.    Cardiovascular:  Negative for chest pain and leg swelling.   Gastrointestinal:  Negative for abdominal pain, constipation, diarrhea, nausea and vomiting.   Musculoskeletal:  Positive for arthralgias, back pain and myalgias.   Skin:  Negative for rash.   Neurological:  Negative for headaches.     Objective:     Vital Signs (Most Recent):  Temp: 98.6 °F (37 °C) (09/14/24 1121)  Pulse: 75 (09/14/24 1121)  Resp: 17 (09/14/24 1347)  BP: 125/87 (09/14/24 1121)  SpO2: (!) 94 % (09/14/24 1121) Vital Signs (24h Range):  Temp:  [97.6 °F (36.4 °C)-99 °F (37.2 °C)] 98.6 °F (37 °C)  Pulse:  [72-93] 75  Resp:  [14-20] 17  SpO2:  [93 %-99 %] 94 %  BP: (105-153)/(57-93) 125/87     Weight: 109.3 kg (241 lb)  Body mass index is 44.08 kg/m².  No intake or output data in the 24 hours ending 09/14/24 1428      Physical Exam  Vitals and nursing note reviewed.   HENT:      Head: Normocephalic and atraumatic.   Cardiovascular:      Rate and Rhythm: Normal rate and regular rhythm.      Pulses: Normal pulses.   Pulmonary:      Effort: Pulmonary effort is normal. No respiratory distress.      Breath sounds: No wheezing or rales.   Abdominal:      Palpations: Abdomen is soft.      Tenderness: There is no abdominal tenderness. There is no guarding.   Skin:     General: Skin is warm and dry.      Findings: Bruising present.       Comments: Contusion to R lateral thigh, L medial ankle   Neurological:      General: No focal deficit present.      Mental Status: She is alert and oriented to person, place, and time.   Psychiatric:         Mood and Affect: Mood normal.             Significant Labs: All pertinent labs within the past 24 hours have been reviewed.    Significant Imaging: I have reviewed all pertinent imaging results/findings within the past 24 hours.    Assessment/Plan:      * Fall  Pt present with trauma to her right upper/lower leg and her left lower leg/ankle after a fall. She reports she was trying to prevent her mother with dementia from getting out of bed, but her mother pulled away causing her to fall forward. She also notes striking her head. Denies LOC, headache, or vision changes. She was having severe pain at these areas that is not improved with MS contin and Percocet which she takes for sickle cell disease. No obvious deformity on exam.    -Xray bilateral tib/fib with no convincing acute displaced fracture or dislocation of the left or right tibia/fibula. Edema overlies the proximal aspect of the right tibia.  -Xray R femur with subtle cortical irregularity at the level of the proximal femoral metaphysis, not confirmed on the orthogonal views.   -Xray L ankle with moderate ankle soft tissue edema, asymmetric along the medial ankle. Age-indeterminate punctate calcification adjacent to the medial malleolus, potentially a chronic finding or related to small avulsion fracture. Otherwise, no acute displaced fracture. Per discussion with Ortho, nothing to do.  -MM pain control with gabapentin, tylenol, tizanidine, and MS contin. Will attempt oral oxy IR with IV dilaudid for breakthrough pain.  -Fall precautions.  -ok to WBAT, ambulate independently or with CAM boot. Plan to consult PT/OT on Mon pending pain control.    Constipation  -Chronic issue.  -Continue lactulose and senna-doc.    Anemia  Anemia is likely due to chronic  disease due to sickle cell-beta thalassemia . Most recent hemoglobin and hematocrit are listed below.  Recent Labs     09/13/24  1742 09/14/24  0324   HGB 9.7* 8.6*   HCT 28.7* 25.7*       Plan  - Monitor serial CBC: Daily  - Transfuse PRBC if patient becomes hemodynamically unstable, symptomatic or H/H drops below 7/21.  - Patient has not received any PRBC transfusions to date  - Patient's anemia is currently stable    Thrombosis of internal carotid, left  See PE      Sickle cell pain crisis  Anemia is likely due to chronic disease due to sickle cell . Most recent hemoglobin and hematocrit are listed below.  Recent Labs     09/13/24  1742 09/14/24  0324   HGB 9.7* 8.6*   HCT 28.7* 25.7*     Plan  - Monitor serial CBC: Daily  - Transfuse PRBC if patient becomes hemodynamically unstable, symptomatic or H/H drops below 7/21.  - Patient has not received any PRBC transfusions to date  - Patient's anemia is currently stable  - See fall    Hypokalemia  Patient's most recent potassium results are listed below.   Recent Labs     09/13/24 1742 09/14/24 0324   K 3.3* 3.4*       Plan  - Replete potassium per protocol  - Monitor potassium Daily  - Patient's hypokalemia is stable    Morbid obesity  Body mass index is 44.08 kg/m². Morbid obesity complicates all aspects of disease management from diagnostic modalities to treatment.     History of pulmonary embolism  Thrombosis of internal carotid, left   - continue full dose Enoxaparin    Intermittent asthma without complication  -No s/s of acute exacerbation.  -PRN duo nebs.    Anxiety  -Chronic, stable.  -Continue fluoxetine.    Trigeminal neuralgia  -History noted.   -Continue gabapentin.    Hypertension  Chronic, controlled. Latest blood pressure and vitals reviewed-     Temp:  [97.6 °F (36.4 °C)-99 °F (37.2 °C)]   Pulse:  [72-93]   Resp:  [14-20]   BP: (105-153)/(57-93)   SpO2:  [93 %-99 %] .   Home meds for hypertension were reviewed and noted below.   Hypertension  Medications               amLODIPine (NORVASC) 10 MG tablet Take 1 tablet (10 mg total) by mouth once daily.    carvediloL (COREG) 25 MG tablet Take 1 tablet (25 mg total) by mouth 2 (two) times daily.            While in the hospital, will manage blood pressure as follows; Continue home antihypertensive regimen    Will utilize p.r.n. blood pressure medication only if patient's blood pressure greater than 180/110 and she develops symptoms such as worsening chest pain or shortness of breath.    Sickle-cell disease with pain  PMHx of Hypertension, Sickle cell-beta thalassemia disease with pain, and Trigeminal neuralgia who presents with L ankle/lower leg pain and swelling after a ground level fall that occurred yesterday morning. Patient is concerned she is having a sickle cell crisis, although pain appears related to her fall. Pt received  25mg IV benadryl and a total of 6mg IV dilaudid in the ED. Pt also notes fever and mild SOB at home.   - Afebrile, no leukocytosis.  - CXR negative for acute process.  - Flu/RSV/COVID negative  - Labs with Hb 9.7 (at baseline), Retic 2.3, TB 0.7.  - Start multimodial pain regimen - tizanidine 4mg tid prn, tylenol 1,000mg tid, oxy 15 q4h PRN, dilaudid 2mg IV q4h PRN pain unrelieved by PO oxy, continue home gabapentin  - Continue folic acid   - Start D5 .45 NS at 75 ml/hr  - Monitor with daily CBC  - Senna-doc BID  - Benadryl and Zofran PRN  - Wean IV pain meds as tolerated to oral as pain diminishes      VTE Risk Mitigation (From admission, onward)           Ordered     enoxaparin injection 105 mg  2 times daily         09/13/24 2326     IP VTE HIGH RISK PATIENT  Once         09/13/24 2153     Place sequential compression device  Until discontinued         09/13/24 2153                    Discharge Planning   ALYSE: 9/19/2024     Code Status: Full Code   Is the patient medically ready for discharge?:     Reason for patient still in hospital (select all that apply): Treatment                      Shlomo Albert MD  Department of Hospital Medicine   [facility]

## 2024-09-14 NOTE — ASSESSMENT & PLAN NOTE
Chronic, controlled. Latest blood pressure and vitals reviewed-     Temp:  [97.6 °F (36.4 °C)-99 °F (37.2 °C)]   Pulse:  [72-93]   Resp:  [14-20]   BP: (105-153)/(57-93)   SpO2:  [93 %-99 %] .   Home meds for hypertension were reviewed and noted below.   Hypertension Medications               amLODIPine (NORVASC) 10 MG tablet Take 1 tablet (10 mg total) by mouth once daily.    carvediloL (COREG) 25 MG tablet Take 1 tablet (25 mg total) by mouth 2 (two) times daily.            While in the hospital, will manage blood pressure as follows; Continue home antihypertensive regimen    Will utilize p.r.n. blood pressure medication only if patient's blood pressure greater than 180/110 and she develops symptoms such as worsening chest pain or shortness of breath.

## 2024-09-14 NOTE — ASSESSMENT & PLAN NOTE
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 Most recent hemoglobin and hematocrit are listed below.  Recent Labs     09/13/24  1742 09/14/24  0324   HGB 9.7* 8.6*   HCT 28.7* 25.7*       Plan  - Monitor serial CBC: Daily  - Transfuse PRBC if patient becomes hemodynamically unstable, symptomatic or H/H drops below 7/21.  - Patient has not received any PRBC transfusions to date  - Patient's anemia is currently stable

## 2024-09-14 NOTE — ASSESSMENT & PLAN NOTE
Chronic, controlled. Latest blood pressure and vitals reviewed-     Temp:  [97.6 °F (36.4 °C)-99 °F (37.2 °C)]   Pulse:  [73-93]   Resp:  [14-20]   BP: (105-153)/(57-93)   SpO2:  [95 %-99 %] .   Home meds for hypertension were reviewed and noted below.   Hypertension Medications               amLODIPine (NORVASC) 10 MG tablet Take 1 tablet (10 mg total) by mouth once daily.    carvediloL (COREG) 25 MG tablet Take 1 tablet (25 mg total) by mouth 2 (two) times daily.            While in the hospital, will manage blood pressure as follows; Continue home antihypertensive regimen    Will utilize p.r.n. blood pressure medication only if patient's blood pressure greater than 180/110 and she develops symptoms such as worsening chest pain or shortness of breath.

## 2024-09-14 NOTE — H&P
[facility] - Emergency Dept  [facility] Medicine  History & Physical    Patient Name: Nazanin Malone  MRN: [id number]  Patient Class: OP- Observation  Admission Date: 9/13/2024  Attending Physician: Shlomo Albert MD   Primary Care Provider: Pee Montgomery MD         Patient information was obtained from patient, past medical records, and ER records.     Subjective:     Principal Problem:Fall    Chief Complaint:   Chief Complaint   Patient presents with    Leg Pain     Pt arrived via EMS reporting L ankle pain and swelling after mechanical fall. Pt reports also hitting her head, denies vision changes or head pain. +anticoagulants, -LOC. , 75mcg fentanyl given by EMS PTA        HPI: Nazanin Malone is a 44 y.o. 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 She reports she was trying to prevent her mother with dementia from getting out of bed, but her mother pulled away causing her to fall forward. This resulted in pain to her right upper/lower leg and her left lower leg/ankle. She also notes striking her head. Denies LOC, headache, or vision changes. She was having severe pain to her legs that is not improved with MS contin and Percocet which she takes for sickle cell disease. The patient reports fever and mild SOB since yesterday but denies cough, chest pain, dizziness, N/V/D, or dysuria.    In the ED, VSS, afebrile. Hgb 9.7 (at baseline). Retic count 2.3. K+3.3. BNP 57. Troponin <0.006. EKG shows SR w/ non specific T wave abnormality, 87 bpm, prolonged QTc 478. UA pending. CXR with interstitial findings accentuated by habitus, no large focal consolidation. Xray bilateral tib/fib with no convincing acute displaced fracture or dislocation of the left or right tibia/fibula. Edema overlies the proximal aspect of the right tibia.  Xray R femur with subtle cortical irregularity at the level of the proximal femoral metaphysis, not confirmed on the orthogonal views. The patient received compazine, IV benadryl, and a total of 6mg IV dilaudid.    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    Asthma     Depression     Hypertension     Morbid obesity     Opioid dependence 2017    Pneumonia due to Streptococcus pneumoniae 2017    Right lower lobe pneumonia 2017    Sepsis due to Streptococcus pneumoniae 2017    Sickle cell-beta thalassemia disease with pain     Trigeminal neuralgia        Past Surgical History:   Procedure Laterality Date     SECTION      TONSILLECTOMY      TUBAL LIGATION         Review of patient's allergies indicates:   Allergen Reactions    Nsaids (non-steroidal anti-inflammatory drug) Itching and Anaphylaxis       No current facility-administered medications on file prior to encounter.     Current Outpatient Medications on File Prior to Encounter   Medication Sig    acetaminophen (TYLENOL) 500 MG tablet Take 1,000 mg by mouth daily as needed for Pain.    albuterol (VENTOLIN HFA) 90 mcg/actuation inhaler Inhale 2 puffs into the lungs every 6 (six) hours as needed for Wheezing or Shortness of Breath. Rescue    amLODIPine (NORVASC) 10 MG tablet Take 1 tablet (10 mg total) by mouth once daily.    ascorbic acid (VITAMIN C ORAL) Take 1 capsule by mouth once daily.    carvediloL (COREG) 25 MG tablet Take 1 tablet (25 mg total) by mouth 2 (two) times daily.    enoxaparin (LOVENOX) 120 mg/0.8 mL Syrg Push out 0.1 ml then Inject 0.7 mLs (105 mg total) into the skin 2 (two) times daily.    FLUoxetine 40 MG capsule Take 1 capsule (40 mg total) by mouth once daily.    fluticasone propionate (FLONASE) 50 mcg/actuation nasal spray INSTILL 1 SPRAY INTO EACH NOSTRIL EVERY DAY    folic acid (FOLVITE) 1 MG tablet  Take 1 tablet (1 mg total) by mouth once daily.    gabapentin (NEURONTIN) 400 MG capsule Take 1 capsule (400 mg total) by mouth 2 (two) times daily.    lactulose (CHRONULAC) 10 gram/15 mL solution Take 30 mLs (20 g total) by mouth every evening.    morphine (MS CONTIN) 30 MG 12 hr tablet Take 1 tablet (30 mg total) by mouth every 12 (twelve) hours.    oxyCODONE-acetaminophen (PERCOCET)  mg per tablet Take 1 tablet by mouth every 6 (six) hours as needed for Pain.    promethazine (PHENERGAN) 25 MG tablet Take 1 tablet (25 mg total) by mouth every 6 (six) hours as needed for Nausea.    senna-docusate 8.6-50 mg (PERICOLACE) 8.6-50 mg per tablet Take 1 tablet by mouth daily as needed for Constipation.    tiZANidine (ZANAFLEX) 4 MG tablet Take 1 tablet (4 mg total) by mouth every 8 (eight) hours as needed.     Family History       Problem Relation (Age of Onset)    Diabetes Mother    Heart disease Mother, Father          Tobacco Use    Smoking status: Never    Smokeless tobacco: Never   Substance and Sexual Activity    Alcohol use: No    Drug use: Yes     Types: Marijuana     Comment: periodically     Sexual activity: Not Currently     Birth control/protection: See Surgical Hx     Review of Systems   Constitutional:  Positive for fever. Negative for appetite change, chills, diaphoresis and fatigue.   HENT:  Negative for congestion, rhinorrhea and sore throat.    Respiratory:  Positive for shortness of breath. Negative for cough and wheezing.    Cardiovascular:  Negative for chest pain, palpitations and leg swelling.   Gastrointestinal:  Negative for abdominal distention, abdominal pain, diarrhea, nausea and vomiting.   Genitourinary:  Negative for difficulty urinating, dysuria and hematuria.   Musculoskeletal:  Positive for arthralgias, joint swelling and myalgias. Negative for back pain and neck pain.   Skin:  Negative for color change, pallor, rash and wound.   Neurological:  Negative for syncope, weakness,  light-headedness, numbness and headaches.   Psychiatric/Behavioral:  Negative for confusion and hallucinations. The patient is not nervous/anxious.      Objective:     Vital Signs (Most Recent):  Temp: 98 °F (36.7 °C) (09/13/24 1832)  Pulse: 88 (09/13/24 2101)  Resp: 18 (09/13/24 2124)  BP: (!) 146/79 (09/13/24 2301)  SpO2: 96 % (09/13/24 2101) Vital Signs (24h Range):  Temp:  [97.6 °F (36.4 °C)-98 °F (36.7 °C)] 98 °F (36.7 °C)  Pulse:  [81-93] 88  Resp:  [14-19] 18  SpO2:  [96 %-99 %] 96 %  BP: (126-153)/(66-93) 146/79     Weight: 109.3 kg (241 lb)  Body mass index is 44.08 kg/m².     Physical Exam  Vitals and nursing note reviewed.   Constitutional:       General: She is sleeping. She is not in acute distress.     Appearance: She is obese. She is not toxic-appearing or diaphoretic.   HENT:      Head: Normocephalic and atraumatic.      Nose: Nose normal.      Mouth/Throat:      Mouth: Mucous membranes are moist.   Eyes:      Pupils: Pupils are equal, round, and reactive to light.   Cardiovascular:      Rate and Rhythm: Normal rate and regular rhythm.      Pulses: Normal pulses.   Pulmonary:      Effort: Pulmonary effort is normal. No respiratory distress.      Breath sounds: No wheezing, rhonchi or rales.   Abdominal:      General: Bowel sounds are normal. There is no distension.      Palpations: Abdomen is soft.      Tenderness: There is no abdominal tenderness. There is no guarding.   Musculoskeletal:         General: No deformity.      Cervical back: Normal range of motion.      Right upper leg: No tenderness.      Left upper leg: No tenderness.      Left ankle: Swelling present. Tenderness present. Normal range of motion.      Comments: Several contusions noted to R thigh, R shin, and L ankle. L ankle swollen with mild tenderness. No obvious deformity.   Skin:     General: Skin is warm and dry.      Capillary Refill: Capillary refill takes less than 2 seconds.   Neurological:      General: No focal deficit  "present.      Mental Status: She is oriented to person, place, and time and easily aroused.      Cranial Nerves: No dysarthria.      Sensory: No sensory deficit.      Motor: No weakness.      Comments: Pt sleeping initially but awakens to voice. Pt answering questions appropriately is is moving all 4 extremities.    Psychiatric:         Mood and Affect: Mood normal.         Behavior: Behavior normal. Behavior is cooperative.              CRANIAL NERVES     CN III, IV, VI   Pupils are equal, round, and reactive to light.       Significant Labs: All pertinent labs within the past 24 hours have been reviewed.  CBC:   Recent Labs   Lab 09/13/24  1742   WBC 10.33   HGB 9.7*   HCT 28.7*        CMP:   Recent Labs   Lab 09/13/24 1742      K 3.3*      CO2 27   GLU 82   BUN 7   CREATININE 0.9   CALCIUM 9.4   PROT 7.1   ALBUMIN 3.5   BILITOT 0.7   ALKPHOS 60   AST 26   ALT 15   ANIONGAP 8     Troponin:   Recent Labs   Lab 09/13/24 1742   TROPONINI <0.006     Urine Studies: No results for input(s): "COLORU", "APPEARANCEUA", "PHUR", "SPECGRAV", "PROTEINUA", "GLUCUA", "KETONESU", "BILIRUBINUA", "OCCULTUA", "NITRITE", "UROBILINOGEN", "LEUKOCYTESUR", "RBCUA", "WBCUA", "BACTERIA", "SQUAMEPITHEL", "HYALINECASTS" in the last 48 hours.    Invalid input(s): "WRIGHTSUR"    Significant Imaging: I have reviewed all pertinent imaging results/findings within the past 24 hours.  Imaging Results              X-Ray Ankle Complete Left (Final result)  Result time 09/13/24 23:31:02      Final result by Romel Coffey MD (09/13/24 23:31:02)                   Impression:      Moderate ankle soft tissue edema, asymmetric along the medial ankle.  Age-indeterminate punctate calcification adjacent to the medial malleolus, potentially a chronic finding or related to small avulsion fracture.  Suggest correlation with mechanism of injury and point tenderness.    Otherwise, no acute displaced fracture.      Electronically signed " by: Romel Coffey MD  Date:    09/13/2024  Time:    23:31               Narrative:    EXAMINATION:  XR ANKLE COMPLETE 3 VIEW LEFT    CLINICAL HISTORY:  s/p fall;    TECHNIQUE:  AP, lateral and oblique views of the left ankle were performed.    COMPARISON:  None.    FINDINGS:  Age-indeterminate punctate calcification adjacent to the inferior aspect of the medial malleolus.  Otherwise, no acute displaced fracture.  No dislocation.  Moderate soft tissue edema overlying the medial aspect of the ankle, with mild soft tissue edema along the lateral aspect of the ankle.  No unexpected radiopaque foreign body.  Small plantar calcaneal spur.                                       X-Ray Chest AP Portable (Final result)  Result time 09/13/24 18:51:31      Final result by Kd Warren MD (09/13/24 18:51:31)                   Impression:      1. Interstitial findings are accentuated by habitus, no large focal consolidation.      Electronically signed by: Kd Warren MD  Date:    09/13/2024  Time:    18:51               Narrative:    EXAMINATION:  XR CHEST AP PORTABLE    CLINICAL HISTORY:  Acute chest pain;    TECHNIQUE:  Single frontal view of the chest was performed.    COMPARISON:  08/22/2024    FINDINGS:  Right chest wall port catheter tip projects over the distal SVC.  The cardiomediastinal silhouette is not enlarged..  There is no pleural effusion.  The trachea is midline.  The lungs are symmetrically expanded bilaterally with coarse interstitial attenuation, accentuated by habitus..  No large focal consolidation seen.  There is no pneumothorax.  The osseous structures are remarkable for degenerative change..                                       X-Ray Femur Ap/Lat Right (Final result)  Result time 09/13/24 19:41:58      Final result by Kd Warren MD (09/13/24 19:41:58)                   Impression:      1. Subtle cortical irregularity at the level of the proximal femoral metaphysis, not confirmed on  the orthogonal views.  Correlation with any focal tenderness recommended.      Electronically signed by: Kd Warren MD  Date:    09/13/2024  Time:    19:41               Narrative:    EXAMINATION:  XR FEMUR 2 VIEW RIGHT    CLINICAL HISTORY:  Pain in right thigh    TECHNIQUE:  AP and lateral views of the right femur were performed.    COMPARISON:  None    FINDINGS:  Four views right femur.    The right femoral head maintains appropriate relationship with the glenoid.  The acromial sclerotic that the sacroiliac joints are intact.  The pubic symphysis is intact.  There are degenerative changes of the knee.  No large knee joint effusion.  On the frontal view, there is questionable cortical irregularity involving the right femoral metaphysis, not confirmed on the orthogonal views.                                       XR Tibia Fibula 2 View Bilateral (Final result)  Result time 09/13/24 19:43:27      Final result by Kd Warren MD (09/13/24 19:43:27)                   Impression:      1. No convincing acute displaced fracture or dislocation of the left or right tibia/fibula.  2. Edema overlies the proximal aspect of the right tibia.      Electronically signed by: Kd Warren MD  Date:    09/13/2024  Time:    19:43               Narrative:    EXAMINATION:  XR TIBIA FIBULA 2 VIEW BILATERAL    CLINICAL HISTORY:  Pain in unspecified lower leg    COMPARISON:  None    FINDINGS:  Four views bilateral tibia fibula.    There are degenerative changes of the knees, right greater than left.  The right ankle mortise is intact.  The left ankle mortise is intact.  No convincing acute displaced fracture or dislocation of the left or right tibia/fibula.  There are degenerative changes/posttraumatic changes of the superior pole of the right patella.  Edema overlies the proximal aspect of the right tibia.                                      Assessment/Plan:     * Fall  Pt present with trauma to her right upper/lower leg  and her left lower leg/ankle after a fall. She reports she was trying to prevent her mother with dementia from getting out of bed, but her mother pulled away causing her to fall forward. She also notes striking her head. Denies LOC, headache, or vision changes. She was having severe pain at these areas that is not improved with MS contin and Percocet which she takes for sickle cell disease. No obvious deformity on exam.    -Xray bilateral tib/fib with no convincing acute displaced fracture or dislocation of the left or right tibia/fibula. Edema overlies the proximal aspect of the right tibia.  -Xray R femur with subtle cortical irregularity at the level of the proximal femoral metaphysis, not confirmed on the orthogonal views.   -Xray L ankle with moderate ankle soft tissue edema, asymmetric along the medial ankle. Age-indeterminate punctate calcification adjacent to the medial malleolus, potentially a chronic finding or related to small avulsion fracture. Otherwise, no acute displaced fracture.  -CT L ankle/hind foot pending.  -MM pain control with gabapentin, tylenol, robaxin, and MS contin. Will attempt oral oxy IR with IV dilaudid for breakthrough pain.  -Fall precautions.    Sickle-cell disease with pain  PMHx of Hypertension, Sickle cell-beta thalassemia disease with pain, and Trigeminal neuralgia who presents with L ankle/lower leg pain and swelling after a ground level fall that occurred yesterday morning. Patient is concerned she is having a sickle cell crisis, although pain appears related to her fall. Pt received  25mg IV benadryl and a total of 6mg IV dilaudid in the ED. Pt also notes fever and mild SOB at home.   - Afebrile, no leukocytosis.  - CXR negative for acute process.  - UA pending.  - Flu/RSV/COVID swab pending.  - Labs with Hb 9.7 (at baseline), Retic 2.3, TB 0.7.  - Start multimodial pain regimen - robaxin 750mg qid, tylenol 1,000mg tid, oxy 15 q4h PRN, dilaudid 2mg IV q4h PRN pain unrelieved  by PO oxy, continue home gabapentin  - Continue folic acid   - Start D5 .45 NS at 75 ml/hr  - Monitor with daily CBC  - Senna-doc BID  - Benadryl and Zofran PRN  - Wean IV pain meds as tolerated to oral as pain diminishes    Hypokalemia  Patient's most recent potassium results are listed below.   Recent Labs     09/13/24  1742   K 3.3*     Plan  - Replete potassium per protocol  - Monitor potassium Daily  - Patient's hypokalemia is stable    Constipation  -Chronic issue.  -Continue lactulose and senna-colace.    Anemia  Anemia is likely due to chronic disease due to sickle cell-beta thalassemia . Most recent hemoglobin and hematocrit are listed below.  Recent Labs     09/13/24  1742   HGB 9.7*   HCT 28.7*     Plan  - Monitor serial CBC: Daily  - Transfuse PRBC if patient becomes hemodynamically unstable, symptomatic or H/H drops below 7/21.  - Patient has not received any PRBC transfusions to date  - Patient's anemia is currently stable    Morbid obesity  Body mass index is 44.08 kg/m². Morbid obesity complicates all aspects of disease management from diagnostic modalities to treatment.     History of pulmonary embolism  Thrombosis of internal carotid, left   - continue full dose Enoxaparin    Intermittent asthma without complication  -No s/s of acute exacerbation.  -PRN duo nebs.    Anxiety  -Chronic, stable.  -Continue fluoxetine.    Trigeminal neuralgia  -History noted.   -Continue gabapentin.    Hypertension  Chronic, controlled. Latest blood pressure and vitals reviewed-     Temp:  [97.6 °F (36.4 °C)-99 °F (37.2 °C)]   Pulse:  [73-93]   Resp:  [14-20]   BP: (105-153)/(57-93)   SpO2:  [95 %-99 %] .   Home meds for hypertension were reviewed and noted below.   Hypertension Medications               amLODIPine (NORVASC) 10 MG tablet Take 1 tablet (10 mg total) by mouth once daily.    carvediloL (COREG) 25 MG tablet Take 1 tablet (25 mg total) by mouth 2 (two) times daily.            While in the hospital, will  manage blood pressure as follows; Continue home antihypertensive regimen    Will utilize p.r.n. blood pressure medication only if patient's blood pressure greater than 180/110 and she develops symptoms such as worsening chest pain or shortness of breath.      VTE Risk Mitigation (From admission, onward)           Ordered     enoxaparin injection 105 mg  2 times daily         09/13/24 2326     IP VTE HIGH RISK PATIENT  Once         09/13/24 2153     Place sequential compression device  Until discontinued         09/13/24 2153                         On 09/13/2024, patient should be placed in hospital observation services under my care in collaboration with Hawk Cedeno MD.           Dory Acosta NP  Department of Hospital Medicine  [facility] - Emergency Dept

## 2024-09-14 NOTE — PLAN OF CARE
Inpatient Upgrade Note    Nazanin Malone has warranted treatment spanning two or more midnights of hospital level care for the management of  SSC pain related to fall and SSC . She continues to require IV fluids, daily labs, further testing/imaging, monitoring of vital signs, IV pain medication, and medication adjustments. Her condition is also complicated by the following comorbidities:  Obesity, Anemia, Sickle Cell Disease, HTN, Opioid Dependence, and Asthma .

## 2024-09-14 NOTE — SUBJECTIVE & OBJECTIVE
Interval History: Pt with acute pain, requiring intermittent one time doses in addition to current multimodal regimen.    Review of Systems   Constitutional:  Negative for activity change, appetite change and fever.   Respiratory:  Negative for cough, shortness of breath and wheezing.    Cardiovascular:  Negative for chest pain and leg swelling.   Gastrointestinal:  Negative for abdominal pain, constipation, diarrhea, nausea and vomiting.   Musculoskeletal:  Positive for arthralgias, back pain and myalgias.   Skin:  Negative for rash.   Neurological:  Negative for headaches.     Objective:     Vital Signs (Most Recent):  Temp: 98.6 °F (37 °C) (09/14/24 1121)  Pulse: 75 (09/14/24 1121)  Resp: 17 (09/14/24 1347)  BP: 125/87 (09/14/24 1121)  SpO2: (!) 94 % (09/14/24 1121) Vital Signs (24h Range):  Temp:  [97.6 °F (36.4 °C)-99 °F (37.2 °C)] 98.6 °F (37 °C)  Pulse:  [72-93] 75  Resp:  [14-20] 17  SpO2:  [93 %-99 %] 94 %  BP: (105-153)/(57-93) 125/87     Weight: 109.3 kg (241 lb)  Body mass index is 44.08 kg/m².  No intake or output data in the 24 hours ending 09/14/24 1428      Physical Exam  Vitals and nursing note reviewed.   HENT:      Head: Normocephalic and atraumatic.   Cardiovascular:      Rate and Rhythm: Normal rate and regular rhythm.      Pulses: Normal pulses.   Pulmonary:      Effort: Pulmonary effort is normal. No respiratory distress.      Breath sounds: No wheezing or rales.   Abdominal:      Palpations: Abdomen is soft.      Tenderness: There is no abdominal tenderness. There is no guarding.   Skin:     General: Skin is warm and dry.      Findings: Bruising present.      Comments: Contusion to R lateral thigh, L medial ankle   Neurological:      General: No focal deficit present.      Mental Status: She is alert and oriented to person, place, and time.   Psychiatric:         Mood and Affect: Mood normal.             Significant Labs: All pertinent labs within the past 24 hours have been  reviewed.    Significant Imaging: I have reviewed all pertinent imaging results/findings within the past 24 hours.

## 2024-09-14 NOTE — ASSESSMENT & PLAN NOTE
Patient's most recent potassium results are listed below.   Recent Labs     09/13/24  1742   K 3.3*     Plan  - Replete potassium per protocol  - Monitor potassium Daily  - Patient's hypokalemia is stable

## 2024-09-14 NOTE — SUBJECTIVE & OBJECTIVE
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    Asthma     Depression     Hypertension     Morbid obesity     Opioid dependence 2017    Pneumonia due to Streptococcus pneumoniae 2017    Right lower lobe pneumonia 2017    Sepsis due to Streptococcus pneumoniae 2017    Sickle cell-beta thalassemia disease with pain     Trigeminal neuralgia        Past Surgical History:   Procedure Laterality Date     SECTION      TONSILLECTOMY      TUBAL LIGATION         Review of patient's allergies indicates:   Allergen Reactions    Nsaids (non-steroidal anti-inflammatory drug) Itching and Anaphylaxis       No current facility-administered medications on file prior to encounter.     Current Outpatient Medications on File Prior to Encounter   Medication Sig    acetaminophen (TYLENOL) 500 MG tablet Take 1,000 mg by mouth daily as needed for Pain.    albuterol (VENTOLIN HFA) 90 mcg/actuation inhaler Inhale 2 puffs into the lungs every 6 (six) hours as needed for Wheezing or Shortness of Breath. Rescue    amLODIPine (NORVASC) 10 MG tablet Take 1 tablet (10 mg total) by mouth once daily.    ascorbic acid (VITAMIN C ORAL) Take 1 capsule by mouth once daily.    carvediloL (COREG) 25 MG tablet Take 1 tablet (25 mg total) by mouth 2 (two) times daily.    enoxaparin (LOVENOX) 120 mg/0.8 mL Syrg Push out 0.1 ml then Inject 0.7 mLs (105 mg total) into the skin 2 (two) times daily.    FLUoxetine 40 MG capsule Take 1 capsule (40 mg total) by mouth once daily.    fluticasone propionate (FLONASE) 50 mcg/actuation nasal spray INSTILL 1 SPRAY INTO EACH NOSTRIL EVERY DAY    folic acid (FOLVITE) 1 MG tablet Take 1 tablet (1 mg total) by mouth once daily.    gabapentin (NEURONTIN) 400 MG capsule Take 1 capsule (400 mg total) by mouth 2 (two) times daily.    lactulose (CHRONULAC) 10 gram/15 mL solution Take 30 mLs (20 g  total) by mouth every evening.    morphine (MS CONTIN) 30 MG 12 hr tablet Take 1 tablet (30 mg total) by mouth every 12 (twelve) hours.    oxyCODONE-acetaminophen (PERCOCET)  mg per tablet Take 1 tablet by mouth every 6 (six) hours as needed for Pain.    promethazine (PHENERGAN) 25 MG tablet Take 1 tablet (25 mg total) by mouth every 6 (six) hours as needed for Nausea.    senna-docusate 8.6-50 mg (PERICOLACE) 8.6-50 mg per tablet Take 1 tablet by mouth daily as needed for Constipation.    tiZANidine (ZANAFLEX) 4 MG tablet Take 1 tablet (4 mg total) by mouth every 8 (eight) hours as needed.     Family History       Problem Relation (Age of Onset)    Diabetes Mother    Heart disease Mother, Father          Tobacco Use    Smoking status: Never    Smokeless tobacco: Never   Substance and Sexual Activity    Alcohol use: No    Drug use: Yes     Types: Marijuana     Comment: periodically     Sexual activity: Not Currently     Birth control/protection: See Surgical Hx     Review of Systems   Constitutional:  Positive for fever. Negative for appetite change, chills, diaphoresis and fatigue.   HENT:  Negative for congestion, rhinorrhea and sore throat.    Respiratory:  Positive for shortness of breath. Negative for cough and wheezing.    Cardiovascular:  Negative for chest pain, palpitations and leg swelling.   Gastrointestinal:  Negative for abdominal distention, abdominal pain, diarrhea, nausea and vomiting.   Genitourinary:  Negative for difficulty urinating, dysuria and hematuria.   Musculoskeletal:  Positive for arthralgias, joint swelling and myalgias. Negative for back pain and neck pain.   Skin:  Negative for color change, pallor, rash and wound.   Neurological:  Negative for syncope, weakness, light-headedness, numbness and headaches.   Psychiatric/Behavioral:  Negative for confusion and hallucinations. The patient is not nervous/anxious.      Objective:     Vital Signs (Most Recent):  Temp: 98 °F (36.7 °C)  (09/13/24 1832)  Pulse: 88 (09/13/24 2101)  Resp: 18 (09/13/24 2124)  BP: (!) 146/79 (09/13/24 2301)  SpO2: 96 % (09/13/24 2101) Vital Signs (24h Range):  Temp:  [97.6 °F (36.4 °C)-98 °F (36.7 °C)] 98 °F (36.7 °C)  Pulse:  [81-93] 88  Resp:  [14-19] 18  SpO2:  [96 %-99 %] 96 %  BP: (126-153)/(66-93) 146/79     Weight: 109.3 kg (241 lb)  Body mass index is 44.08 kg/m².     Physical Exam  Vitals and nursing note reviewed.   Constitutional:       General: She is sleeping. She is not in acute distress.     Appearance: She is obese. She is not toxic-appearing or diaphoretic.   HENT:      Head: Normocephalic and atraumatic.      Nose: Nose normal.      Mouth/Throat:      Mouth: Mucous membranes are moist.   Eyes:      Pupils: Pupils are equal, round, and reactive to light.   Cardiovascular:      Rate and Rhythm: Normal rate and regular rhythm.      Pulses: Normal pulses.   Pulmonary:      Effort: Pulmonary effort is normal. No respiratory distress.      Breath sounds: No wheezing, rhonchi or rales.   Abdominal:      General: Bowel sounds are normal. There is no distension.      Palpations: Abdomen is soft.      Tenderness: There is no abdominal tenderness. There is no guarding.   Musculoskeletal:         General: No deformity.      Cervical back: Normal range of motion.      Right upper leg: No tenderness.      Left upper leg: No tenderness.      Left ankle: Swelling present. Tenderness present. Normal range of motion.      Comments: Several contusions noted to R thigh, R shin, and L ankle. L ankle swollen with mild tenderness. No obvious deformity.   Skin:     General: Skin is warm and dry.      Capillary Refill: Capillary refill takes less than 2 seconds.   Neurological:      General: No focal deficit present.      Mental Status: She is oriented to person, place, and time and easily aroused.      Cranial Nerves: No dysarthria.      Sensory: No sensory deficit.      Motor: No weakness.      Comments: Pt sleeping  "initially but awakens to voice. Pt answering questions appropriately is is moving all 4 extremities.    Psychiatric:         Mood and Affect: Mood normal.         Behavior: Behavior normal. Behavior is cooperative.              CRANIAL NERVES     CN III, IV, VI   Pupils are equal, round, and reactive to light.       Significant Labs: All pertinent labs within the past 24 hours have been reviewed.  CBC:   Recent Labs   Lab 09/13/24  1742   WBC 10.33   HGB 9.7*   HCT 28.7*        CMP:   Recent Labs   Lab 09/13/24  1742      K 3.3*      CO2 27   GLU 82   BUN 7   CREATININE 0.9   CALCIUM 9.4   PROT 7.1   ALBUMIN 3.5   BILITOT 0.7   ALKPHOS 60   AST 26   ALT 15   ANIONGAP 8     Troponin:   Recent Labs   Lab 09/13/24  1742   TROPONINI <0.006     Urine Studies: No results for input(s): "COLORU", "APPEARANCEUA", "PHUR", "SPECGRAV", "PROTEINUA", "GLUCUA", "KETONESU", "BILIRUBINUA", "OCCULTUA", "NITRITE", "UROBILINOGEN", "LEUKOCYTESUR", "RBCUA", "WBCUA", "BACTERIA", "SQUAMEPITHEL", "HYALINECASTS" in the last 48 hours.    Invalid input(s): "WRIGHTSUR"    Significant Imaging: I have reviewed all pertinent imaging results/findings within the past 24 hours.  Imaging Results              X-Ray Ankle Complete Left (Final result)  Result time 09/13/24 23:31:02      Final result by Romel Coffey MD (09/13/24 23:31:02)                   Impression:      Moderate ankle soft tissue edema, asymmetric along the medial ankle.  Age-indeterminate punctate calcification adjacent to the medial malleolus, potentially a chronic finding or related to small avulsion fracture.  Suggest correlation with mechanism of injury and point tenderness.    Otherwise, no acute displaced fracture.      Electronically signed by: Romel Coffey MD  Date:    09/13/2024  Time:    23:31               Narrative:    EXAMINATION:  XR ANKLE COMPLETE 3 VIEW LEFT    CLINICAL HISTORY:  s/p fall;    TECHNIQUE:  AP, lateral and oblique views of the " left ankle were performed.    COMPARISON:  None.    FINDINGS:  Age-indeterminate punctate calcification adjacent to the inferior aspect of the medial malleolus.  Otherwise, no acute displaced fracture.  No dislocation.  Moderate soft tissue edema overlying the medial aspect of the ankle, with mild soft tissue edema along the lateral aspect of the ankle.  No unexpected radiopaque foreign body.  Small plantar calcaneal spur.                                       X-Ray Chest AP Portable (Final result)  Result time 09/13/24 18:51:31      Final result by Kd Warren MD (09/13/24 18:51:31)                   Impression:      1. Interstitial findings are accentuated by habitus, no large focal consolidation.      Electronically signed by: Kd Warren MD  Date:    09/13/2024  Time:    18:51               Narrative:    EXAMINATION:  XR CHEST AP PORTABLE    CLINICAL HISTORY:  Acute chest pain;    TECHNIQUE:  Single frontal view of the chest was performed.    COMPARISON:  08/22/2024    FINDINGS:  Right chest wall port catheter tip projects over the distal SVC.  The cardiomediastinal silhouette is not enlarged..  There is no pleural effusion.  The trachea is midline.  The lungs are symmetrically expanded bilaterally with coarse interstitial attenuation, accentuated by habitus..  No large focal consolidation seen.  There is no pneumothorax.  The osseous structures are remarkable for degenerative change..                                       X-Ray Femur Ap/Lat Right (Final result)  Result time 09/13/24 19:41:58      Final result by Kd Warren MD (09/13/24 19:41:58)                   Impression:      1. Subtle cortical irregularity at the level of the proximal femoral metaphysis, not confirmed on the orthogonal views.  Correlation with any focal tenderness recommended.      Electronically signed by: Kd Warren MD  Date:    09/13/2024  Time:    19:41               Narrative:    EXAMINATION:  XR FEMUR 2 VIEW  RIGHT    CLINICAL HISTORY:  Pain in right thigh    TECHNIQUE:  AP and lateral views of the right femur were performed.    COMPARISON:  None    FINDINGS:  Four views right femur.    The right femoral head maintains appropriate relationship with the glenoid.  The acromial sclerotic that the sacroiliac joints are intact.  The pubic symphysis is intact.  There are degenerative changes of the knee.  No large knee joint effusion.  On the frontal view, there is questionable cortical irregularity involving the right femoral metaphysis, not confirmed on the orthogonal views.                                       XR Tibia Fibula 2 View Bilateral (Final result)  Result time 09/13/24 19:43:27      Final result by Kd Warren MD (09/13/24 19:43:27)                   Impression:      1. No convincing acute displaced fracture or dislocation of the left or right tibia/fibula.  2. Edema overlies the proximal aspect of the right tibia.      Electronically signed by: Kd Warren MD  Date:    09/13/2024  Time:    19:43               Narrative:    EXAMINATION:  XR TIBIA FIBULA 2 VIEW BILATERAL    CLINICAL HISTORY:  Pain in unspecified lower leg    COMPARISON:  None    FINDINGS:  Four views bilateral tibia fibula.    There are degenerative changes of the knees, right greater than left.  The right ankle mortise is intact.  The left ankle mortise is intact.  No convincing acute displaced fracture or dislocation of the left or right tibia/fibula.  There are degenerative changes/posttraumatic changes of the superior pole of the right patella.  Edema overlies the proximal aspect of the right tibia.

## 2024-09-14 NOTE — HOSPITAL COURSE
Per Ortho review of imaging, small L medial malleolus avulsion, but uncertain of chronicity given no prior imaging. No intervention indicated for this type of injury. WBAT and ambulation with CAM boot if it facilitates ambulation with less pain. PT/OT recommend no therapy. Review of femoral metaphysis without concern for acute injury. Sickle cell pain crisis precipitated by mechanical fall treated with multimodal and opioid pain regimen in addition to home opioid regimen. Pt discharged after continued IV dilaudid wean to Oxy 40mg po regimen with sufficient medication until next follow up. Lovenox dosing decreased given recurrent supratherapeutic values during inpatient Xa monitoring. Encouraged continued ambulation at home with CAM boot as needed.

## 2024-09-14 NOTE — ASSESSMENT & PLAN NOTE
Body mass index is 44.08 kg/m². Morbid obesity complicates all aspects of disease management from diagnostic modalities to treatment.

## 2024-09-14 NOTE — ASSESSMENT & PLAN NOTE
Patient's most recent potassium results are listed below.   Recent Labs     09/13/24  1742 09/14/24  0324   K 3.3* 3.4*       Plan  - Replete potassium per protocol  - Monitor potassium Daily  - Patient's hypokalemia is stable

## 2024-09-14 NOTE — ASSESSMENT & PLAN NOTE
Pt present with trauma to her right upper/lower leg and her left lower leg/ankle after a fall. She reports she was trying to prevent her mother with dementia from getting out of bed, but her mother pulled away causing her to fall forward. She also notes striking her head. Denies LOC, headache, or vision changes. She was having severe pain at these areas that is not improved with MS contin and Percocet which she takes for sickle cell disease. No obvious deformity on exam.    -Xray bilateral tib/fib with no convincing acute displaced fracture or dislocation of the left or right tibia/fibula. Edema overlies the proximal aspect of the right tibia.  -Xray R femur with subtle cortical irregularity at the level of the proximal femoral metaphysis, not confirmed on the orthogonal views.   -Xray L ankle with moderate ankle soft tissue edema, asymmetric along the medial ankle. Age-indeterminate punctate calcification adjacent to the medial malleolus, potentially a chronic finding or related to small avulsion fracture. Otherwise, no acute displaced fracture.  -CT L ankle/hind foot pending.  -MM pain control with gabapentin, tylenol, robaxin, and MS contin. Will attempt oral oxy IR with IV dilaudid for breakthrough pain.  -Fall precautions.

## 2024-09-14 NOTE — NURSING
Nurses Note -- 4 Eyes      9/14/2024   6:16 AM      Skin assessed during: Admit      [x] No Altered Skin Integrity Present    []Prevention Measures Documented      [] Yes- Altered Skin Integrity Present or Discovered   [] LDA Added if Not in Epic (Describe Wound)   [] New Altered Skin Integrity was Present on Admit and Documented in LDA   [] Wound Image Taken    Wound Care Consulted? No    Attending Nurse:  Pravin Damian RN/Staff Member:   FRANCIS Reynolds

## 2024-09-14 NOTE — HPI
Nazanin Malone is a 44 y.o. 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 She reports she was trying to prevent her mother with dementia from getting out of bed, but her mother pulled away causing her to fall forward. This resulted in pain to her right upper/lower leg and her left lower leg/ankle. She also notes striking her head. Denies LOC, headache, or vision changes. She was having severe pain to her legs that is not improved with MS contin and Percocet which she takes for sickle cell disease. The patient reports fever and mild SOB since yesterday but denies cough, chest pain, dizziness, N/V/D, or dysuria.    In the ED, VSS, afebrile. Hgb 9.7 (at baseline). Retic count 2.3. K+3.3. BNP 57. Troponin <0.006. EKG shows SR w/ non specific T wave abnormality, 87 bpm, prolonged QTc 478. UA pending. CXR with interstitial findings accentuated by habitus, no large focal consolidation. Xray bilateral tib/fib with no convincing acute displaced fracture or dislocation of the left or right tibia/fibula. Edema overlies the proximal aspect of the right tibia. Xray R femur with subtle cortical irregularity at the level of the proximal femoral metaphysis, not confirmed on the orthogonal views. The patient received compazine, IV benadryl, and a total of 6mg IV dilaudid.

## 2024-09-15 LAB
ALBUMIN SERPL BCP-MCNC: 3.4 G/DL (ref 3.5–5.2)
ALP SERPL-CCNC: 64 U/L (ref 55–135)
ALT SERPL W/O P-5'-P-CCNC: 12 U/L (ref 10–44)
ANION GAP SERPL CALC-SCNC: 7 MMOL/L (ref 8–16)
AST SERPL-CCNC: 21 U/L (ref 10–40)
BASOPHILS # BLD AUTO: 0.05 K/UL (ref 0–0.2)
BASOPHILS NFR BLD: 0.5 % (ref 0–1.9)
BILIRUB SERPL-MCNC: 0.6 MG/DL (ref 0.1–1)
BUN SERPL-MCNC: 11 MG/DL (ref 6–20)
CALCIUM SERPL-MCNC: 9.2 MG/DL (ref 8.7–10.5)
CHLORIDE SERPL-SCNC: 103 MMOL/L (ref 95–110)
CO2 SERPL-SCNC: 29 MMOL/L (ref 23–29)
CREAT SERPL-MCNC: 1.3 MG/DL (ref 0.5–1.4)
DIFFERENTIAL METHOD BLD: ABNORMAL
EOSINOPHIL # BLD AUTO: 0.5 K/UL (ref 0–0.5)
EOSINOPHIL NFR BLD: 4.6 % (ref 0–8)
ERYTHROCYTE [DISTWIDTH] IN BLOOD BY AUTOMATED COUNT: 20.3 % (ref 11.5–14.5)
EST. GFR  (NO RACE VARIABLE): 51.4 ML/MIN/1.73 M^2
FACT X PPP CHRO-ACNC: 1.48 IU/ML (ref 0.3–0.7)
GLUCOSE SERPL-MCNC: 114 MG/DL (ref 70–110)
HCT VFR BLD AUTO: 25.5 % (ref 37–48.5)
HGB BLD-MCNC: 8.6 G/DL (ref 12–16)
IMM GRANULOCYTES # BLD AUTO: 0.05 K/UL (ref 0–0.04)
IMM GRANULOCYTES NFR BLD AUTO: 0.5 % (ref 0–0.5)
LYMPHOCYTES # BLD AUTO: 3.6 K/UL (ref 1–4.8)
LYMPHOCYTES NFR BLD: 36.8 % (ref 18–48)
MAGNESIUM SERPL-MCNC: 2.1 MG/DL (ref 1.6–2.6)
MCH RBC QN AUTO: 25.7 PG (ref 27–31)
MCHC RBC AUTO-ENTMCNC: 33.7 G/DL (ref 32–36)
MCV RBC AUTO: 76 FL (ref 82–98)
MONOCYTES # BLD AUTO: 1.3 K/UL (ref 0.3–1)
MONOCYTES NFR BLD: 13 % (ref 4–15)
NEUTROPHILS # BLD AUTO: 4.4 K/UL (ref 1.8–7.7)
NEUTROPHILS NFR BLD: 44.6 % (ref 38–73)
NRBC BLD-RTO: 4 /100 WBC
PLATELET # BLD AUTO: 358 K/UL (ref 150–450)
PMV BLD AUTO: 10.9 FL (ref 9.2–12.9)
POTASSIUM SERPL-SCNC: 3.6 MMOL/L (ref 3.5–5.1)
PROT SERPL-MCNC: 7.1 G/DL (ref 6–8.4)
RBC # BLD AUTO: 3.35 M/UL (ref 4–5.4)
RETICS/RBC NFR AUTO: 2.5 % (ref 0.5–2.5)
SODIUM SERPL-SCNC: 139 MMOL/L (ref 136–145)
WBC # BLD AUTO: 9.87 K/UL (ref 3.9–12.7)

## 2024-09-15 PROCEDURE — 63600175 PHARM REV CODE 636 W HCPCS

## 2024-09-15 PROCEDURE — 21400001 HC TELEMETRY ROOM

## 2024-09-15 PROCEDURE — 80053 COMPREHEN METABOLIC PANEL: CPT | Performed by: NURSE PRACTITIONER

## 2024-09-15 PROCEDURE — 63600175 PHARM REV CODE 636 W HCPCS: Performed by: NURSE PRACTITIONER

## 2024-09-15 PROCEDURE — 25000003 PHARM REV CODE 250: Performed by: NURSE PRACTITIONER

## 2024-09-15 PROCEDURE — 85045 AUTOMATED RETICULOCYTE COUNT: CPT | Performed by: NURSE PRACTITIONER

## 2024-09-15 PROCEDURE — 85025 COMPLETE CBC W/AUTO DIFF WBC: CPT | Performed by: NURSE PRACTITIONER

## 2024-09-15 PROCEDURE — 85520 HEPARIN ASSAY: CPT

## 2024-09-15 PROCEDURE — 25000003 PHARM REV CODE 250

## 2024-09-15 PROCEDURE — 83735 ASSAY OF MAGNESIUM: CPT | Performed by: NURSE PRACTITIONER

## 2024-09-15 PROCEDURE — S5010 5% DEXTROSE AND 0.45% SALINE: HCPCS

## 2024-09-15 RX ORDER — ENOXAPARIN SODIUM 100 MG/ML
80 INJECTION SUBCUTANEOUS EVERY 12 HOURS
Status: DISCONTINUED | OUTPATIENT
Start: 2024-09-15 | End: 2024-09-18

## 2024-09-15 RX ORDER — HYDROMORPHONE HYDROCHLORIDE 1 MG/ML
1 INJECTION, SOLUTION INTRAMUSCULAR; INTRAVENOUS; SUBCUTANEOUS ONCE
Status: COMPLETED | OUTPATIENT
Start: 2024-09-15 | End: 2024-09-15

## 2024-09-15 RX ORDER — HYDROMORPHONE HYDROCHLORIDE 1 MG/ML
4 INJECTION, SOLUTION INTRAMUSCULAR; INTRAVENOUS; SUBCUTANEOUS EVERY 4 HOURS PRN
Status: DISCONTINUED | OUTPATIENT
Start: 2024-09-15 | End: 2024-09-18

## 2024-09-15 RX ORDER — DIPHENHYDRAMINE HYDROCHLORIDE 50 MG/ML
50 INJECTION INTRAMUSCULAR; INTRAVENOUS EVERY 6 HOURS PRN
Status: DISCONTINUED | OUTPATIENT
Start: 2024-09-15 | End: 2024-09-19 | Stop reason: HOSPADM

## 2024-09-15 RX ORDER — PROCHLORPERAZINE MALEATE 5 MG
5 TABLET ORAL ONCE
Status: COMPLETED | OUTPATIENT
Start: 2024-09-15 | End: 2024-09-15

## 2024-09-15 RX ORDER — OXYCODONE HYDROCHLORIDE 10 MG/1
30 TABLET ORAL EVERY 4 HOURS PRN
Status: DISCONTINUED | OUTPATIENT
Start: 2024-09-15 | End: 2024-09-18

## 2024-09-15 RX ADMIN — FLUTICASONE PROPIONATE 50 MCG: 50 SPRAY, METERED NASAL at 08:09

## 2024-09-15 RX ADMIN — GABAPENTIN 400 MG: 400 CAPSULE ORAL at 08:09

## 2024-09-15 RX ADMIN — SENNOSIDES AND DOCUSATE SODIUM 1 TABLET: 50; 8.6 TABLET ORAL at 08:09

## 2024-09-15 RX ADMIN — ENOXAPARIN SODIUM 105 MG: 120 INJECTION SUBCUTANEOUS at 08:09

## 2024-09-15 RX ADMIN — HYDROMORPHONE HYDROCHLORIDE 4 MG: 1 INJECTION, SOLUTION INTRAMUSCULAR; INTRAVENOUS; SUBCUTANEOUS at 03:09

## 2024-09-15 RX ADMIN — LACTULOSE 20 G: 20 SOLUTION ORAL at 08:09

## 2024-09-15 RX ADMIN — PROCHLORPERAZINE MALEATE 5 MG: 5 TABLET ORAL at 05:09

## 2024-09-15 RX ADMIN — DIPHENHYDRAMINE HYDROCHLORIDE 25 MG: 50 INJECTION, SOLUTION INTRAMUSCULAR; INTRAVENOUS at 09:09

## 2024-09-15 RX ADMIN — OXYCODONE HYDROCHLORIDE 30 MG: 10 TABLET ORAL at 01:09

## 2024-09-15 RX ADMIN — ACETAMINOPHEN 1000 MG: 500 TABLET ORAL at 01:09

## 2024-09-15 RX ADMIN — MORPHINE SULFATE 30 MG: 30 TABLET, FILM COATED, EXTENDED RELEASE ORAL at 08:09

## 2024-09-15 RX ADMIN — ACETAMINOPHEN 1000 MG: 500 TABLET ORAL at 07:09

## 2024-09-15 RX ADMIN — ACETAMINOPHEN 1000 MG: 500 TABLET ORAL at 09:09

## 2024-09-15 RX ADMIN — OXYCODONE HYDROCHLORIDE 20 MG: 10 TABLET ORAL at 01:09

## 2024-09-15 RX ADMIN — HYDROMORPHONE HYDROCHLORIDE 1 MG: 1 INJECTION, SOLUTION INTRAMUSCULAR; INTRAVENOUS; SUBCUTANEOUS at 10:09

## 2024-09-15 RX ADMIN — OXYCODONE HYDROCHLORIDE 20 MG: 10 TABLET ORAL at 09:09

## 2024-09-15 RX ADMIN — DIPHENHYDRAMINE HYDROCHLORIDE 25 MG: 50 INJECTION, SOLUTION INTRAMUSCULAR; INTRAVENOUS at 03:09

## 2024-09-15 RX ADMIN — HYDROMORPHONE HYDROCHLORIDE 4 MG: 1 INJECTION, SOLUTION INTRAMUSCULAR; INTRAVENOUS; SUBCUTANEOUS at 08:09

## 2024-09-15 RX ADMIN — AMLODIPINE BESYLATE 10 MG: 10 TABLET ORAL at 08:09

## 2024-09-15 RX ADMIN — DEXTROSE AND SODIUM CHLORIDE: 5; 450 INJECTION, SOLUTION INTRAVENOUS at 03:09

## 2024-09-15 RX ADMIN — OXYCODONE HYDROCHLORIDE 30 MG: 10 TABLET ORAL at 09:09

## 2024-09-15 RX ADMIN — FLUOXETINE HYDROCHLORIDE 40 MG: 20 CAPSULE ORAL at 08:09

## 2024-09-15 RX ADMIN — ENOXAPARIN SODIUM 80 MG: 100 INJECTION SUBCUTANEOUS at 09:09

## 2024-09-15 RX ADMIN — ONDANSETRON 4 MG: 2 INJECTION INTRAMUSCULAR; INTRAVENOUS at 03:09

## 2024-09-15 RX ADMIN — DIPHENHYDRAMINE HYDROCHLORIDE 50 MG: 50 INJECTION, SOLUTION INTRAMUSCULAR; INTRAVENOUS at 03:09

## 2024-09-15 RX ADMIN — CARVEDILOL 25 MG: 25 TABLET, FILM COATED ORAL at 08:09

## 2024-09-15 RX ADMIN — HYDROMORPHONE HYDROCHLORIDE 4 MG: 1 INJECTION, SOLUTION INTRAMUSCULAR; INTRAVENOUS; SUBCUTANEOUS at 11:09

## 2024-09-15 RX ADMIN — FOLIC ACID 1 MG: 1 TABLET ORAL at 08:09

## 2024-09-15 RX ADMIN — OXYCODONE HYDROCHLORIDE 30 MG: 10 TABLET ORAL at 05:09

## 2024-09-15 RX ADMIN — DIPHENHYDRAMINE HYDROCHLORIDE 50 MG: 50 INJECTION, SOLUTION INTRAMUSCULAR; INTRAVENOUS at 09:09

## 2024-09-15 RX ADMIN — HYDROMORPHONE HYDROCHLORIDE 3 MG: 1 INJECTION, SOLUTION INTRAMUSCULAR; INTRAVENOUS; SUBCUTANEOUS at 03:09

## 2024-09-15 RX ADMIN — HYDROMORPHONE HYDROCHLORIDE 3 MG: 1 INJECTION, SOLUTION INTRAMUSCULAR; INTRAVENOUS; SUBCUTANEOUS at 07:09

## 2024-09-15 NOTE — SUBJECTIVE & OBJECTIVE
Interval History: Pt with continued pain particularly in LLE and R hip. Uptitrated pain control with goal of working with PT/OT on Mon.    Review of Systems   Constitutional:  Negative for activity change, appetite change, diaphoresis and fever.   Respiratory:  Negative for cough, shortness of breath and wheezing.    Cardiovascular:  Negative for chest pain and leg swelling.   Gastrointestinal:  Negative for abdominal pain, constipation, diarrhea, nausea and vomiting.   Musculoskeletal:  Positive for arthralgias, back pain and myalgias.   Skin:  Negative for rash.   Neurological:  Negative for headaches.     Objective:     Vital Signs (Most Recent):  Temp: 98.6 °F (37 °C) (09/15/24 1101)  Pulse: 85 (09/15/24 1101)  Resp: 18 (09/15/24 1321)  BP: 138/77 (09/15/24 1101)  SpO2: 97 % (09/15/24 1101) Vital Signs (24h Range):  Temp:  [98 °F (36.7 °C)-99.1 °F (37.3 °C)] 98.6 °F (37 °C)  Pulse:  [81-88] 85  Resp:  [17-20] 18  SpO2:  [96 %-98 %] 97 %  BP: (115-146)/(77-84) 138/77     Weight: 109.3 kg (241 lb)  Body mass index is 44.08 kg/m².    Intake/Output Summary (Last 24 hours) at 9/15/2024 1428  Last data filed at 9/15/2024 1104  Gross per 24 hour   Intake 600 ml   Output 1500 ml   Net -900 ml         Physical Exam  Vitals and nursing note reviewed.   HENT:      Head: Normocephalic and atraumatic.   Cardiovascular:      Rate and Rhythm: Normal rate and regular rhythm.      Pulses: Normal pulses.   Pulmonary:      Effort: Pulmonary effort is normal. No respiratory distress.      Breath sounds: No wheezing or rales.   Abdominal:      Palpations: Abdomen is soft.      Tenderness: There is no abdominal tenderness. There is no guarding.   Skin:     General: Skin is warm and dry.      Findings: Bruising present.      Comments: Contusion to R lateral thigh, L medial ankle   Neurological:      General: No focal deficit present.      Mental Status: She is alert and oriented to person, place, and time.   Psychiatric:         Mood  and Affect: Mood normal.             Significant Labs: All pertinent labs within the past 24 hours have been reviewed.    Significant Imaging: I have reviewed all pertinent imaging results/findings within the past 24 hours.

## 2024-09-15 NOTE — ASSESSMENT & PLAN NOTE
Pt present with trauma to her right upper/lower leg and her left lower leg/ankle after a fall. She reports she was trying to prevent her mother with dementia from getting out of bed, but her mother pulled away causing her to fall forward. She also notes striking her head. Denies LOC, headache, or vision changes. She was having severe pain at these areas that is not improved with MS contin and Percocet which she takes for sickle cell disease. No obvious deformity on exam.    -Xray bilateral tib/fib with no convincing acute displaced fracture or dislocation of the left or right tibia/fibula. Edema overlies the proximal aspect of the right tibia.  -Xray R femur with subtle cortical irregularity at the level of the proximal femoral metaphysis, not confirmed on the orthogonal views.   -Xray L ankle with moderate ankle soft tissue edema, asymmetric along the medial ankle. Age-indeterminate punctate calcification adjacent to the medial malleolus, potentially a chronic finding or related to small avulsion fracture. Otherwise, no acute displaced fracture. Per discussion with Ortho, nothing to do.  -MM pain control with gabapentin, tylenol, tizanidine, and MS contin. Will attempt oral oxy IR with IV dilaudid for breakthrough pain.  -Fall precautions.  -ok to WBAT, ambulate independently or with CAM boot (ordered). Plan to consult PT/OT on Mon pending pain control.

## 2024-09-15 NOTE — PROGRESS NOTES
Pt has requested the use of the external catheter today to decrease the times she has to get up because the pain in her leg is worse when she gets up.

## 2024-09-15 NOTE — ASSESSMENT & PLAN NOTE
Chronic, controlled. Latest blood pressure and vitals reviewed-     Temp:  [98 °F (36.7 °C)-99.1 °F (37.3 °C)]   Pulse:  [81-88]   Resp:  [17-20]   BP: (115-146)/(77-84)   SpO2:  [96 %-98 %] .   Home meds for hypertension were reviewed and noted below.   Hypertension Medications               amLODIPine (NORVASC) 10 MG tablet Take 1 tablet (10 mg total) by mouth once daily.    carvediloL (COREG) 25 MG tablet Take 1 tablet (25 mg total) by mouth 2 (two) times daily.            While in the hospital, will manage blood pressure as follows; Continue home antihypertensive regimen    Will utilize p.r.n. blood pressure medication only if patient's blood pressure greater than 180/110 and she develops symptoms such as worsening chest pain or shortness of breath.

## 2024-09-15 NOTE — PROGRESS NOTES
Pt has complained of pain to the left ankle and leg, She has been medicated for pain throughout the day, she spoke to the Dr. Earlier and medication doses were adjusted. Her pain is now 8/10, she has also been medicated for itching and nausea as needed. She has been up to the BR and took a partial bath and changed her clothes independently. He appetite has been good, she has [employer] deliver her some groceries to the room. Her bed has been in the low locked position call light in easy reach, she calls the [employer] when she request assistance.

## 2024-09-15 NOTE — PROGRESS NOTES
[facility]  Progress Note    Patient Name: Nazainn Malone  MRN: [id number]  Patient Class: IP- Inpatient   Admission Date: 9/13/2024  Length of Stay: 1 days  Attending Physician: Shlomo Albert MD  Primary Care Provider: Pee Montgomery MD        Subjective:     Principal Problem:Fall        HPI:  Nazanin Malone is a 44 y.o. 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 She reports she was trying to prevent her mother with dementia from getting out of bed, but her mother pulled away causing her to fall forward. This resulted in pain to her right upper/lower leg and her left lower leg/ankle. She also notes striking her head. Denies LOC, headache, or vision changes. She was having severe pain to her legs that is not improved with MS contin and Percocet which she takes for sickle cell disease. The patient reports fever and mild SOB since yesterday but denies cough, chest pain, dizziness, N/V/D, or dysuria.    In the ED, VSS, afebrile. Hgb 9.7 (at baseline). Retic count 2.3. K+3.3. BNP 57. Troponin <0.006. EKG shows SR w/ non specific T wave abnormality, 87 bpm, prolonged QTc 478. UA pending. CXR with interstitial findings accentuated by habitus, no large focal consolidation. Xray bilateral tib/fib with no convincing acute displaced fracture or dislocation of the left or right tibia/fibula. Edema overlies the proximal aspect of the right tibia. Xray R femur with subtle cortical irregularity at the level of the proximal femoral metaphysis, not confirmed on the orthogonal views. The patient received compazine, IV benadryl, and a total of 6mg IV dilaudid.    Overview/Hospital Course:  Per Ortho review of imaging, small L medial malleolus avulsion, but uncertain of chronicity given no prior imaging. No  intervention indicated for this type of injury. WBAT and ambulation with CAM boot if it facilitates ambulation with less pain. Review of femoral metaphysis without concern for acute injury. Sickle cell pain crisis precipitated by mechanical fall treated with multimodal and opioid pain regimen in addition to home opioid regimen.     Interval History: Pt with continued pain particularly in LLE and R hip. Uptitrated pain control with goal of working with PT/OT on Mon.    Review of Systems   Constitutional:  Negative for activity change, appetite change, diaphoresis and fever.   Respiratory:  Negative for cough, shortness of breath and wheezing.    Cardiovascular:  Negative for chest pain and leg swelling.   Gastrointestinal:  Negative for abdominal pain, constipation, diarrhea, nausea and vomiting.   Musculoskeletal:  Positive for arthralgias, back pain and myalgias.   Skin:  Negative for rash.   Neurological:  Negative for headaches.     Objective:     Vital Signs (Most Recent):  Temp: 98.6 °F (37 °C) (09/15/24 1101)  Pulse: 85 (09/15/24 1101)  Resp: 18 (09/15/24 1321)  BP: 138/77 (09/15/24 1101)  SpO2: 97 % (09/15/24 1101) Vital Signs (24h Range):  Temp:  [98 °F (36.7 °C)-99.1 °F (37.3 °C)] 98.6 °F (37 °C)  Pulse:  [81-88] 85  Resp:  [17-20] 18  SpO2:  [96 %-98 %] 97 %  BP: (115-146)/(77-84) 138/77     Weight: 109.3 kg (241 lb)  Body mass index is 44.08 kg/m².    Intake/Output Summary (Last 24 hours) at 9/15/2024 1428  Last data filed at 9/15/2024 1104  Gross per 24 hour   Intake 600 ml   Output 1500 ml   Net -900 ml         Physical Exam  Vitals and nursing note reviewed.   HENT:      Head: Normocephalic and atraumatic.   Cardiovascular:      Rate and Rhythm: Normal rate and regular rhythm.      Pulses: Normal pulses.   Pulmonary:      Effort: Pulmonary effort is normal. No respiratory distress.      Breath sounds: No wheezing or rales.   Abdominal:      Palpations: Abdomen is soft.      Tenderness: There is no  abdominal tenderness. There is no guarding.   Skin:     General: Skin is warm and dry.      Findings: Bruising present.      Comments: Contusion to R lateral thigh, L medial ankle   Neurological:      General: No focal deficit present.      Mental Status: She is alert and oriented to person, place, and time.   Psychiatric:         Mood and Affect: Mood normal.             Significant Labs: All pertinent labs within the past 24 hours have been reviewed.    Significant Imaging: I have reviewed all pertinent imaging results/findings within the past 24 hours.    Assessment/Plan:      * Fall  Pt present with trauma to her right upper/lower leg and her left lower leg/ankle after a fall. She reports she was trying to prevent her mother with dementia from getting out of bed, but her mother pulled away causing her to fall forward. She also notes striking her head. Denies LOC, headache, or vision changes. She was having severe pain at these areas that is not improved with MS contin and Percocet which she takes for sickle cell disease. No obvious deformity on exam.    -Xray bilateral tib/fib with no convincing acute displaced fracture or dislocation of the left or right tibia/fibula. Edema overlies the proximal aspect of the right tibia.  -Xray R femur with subtle cortical irregularity at the level of the proximal femoral metaphysis, not confirmed on the orthogonal views.   -Xray L ankle with moderate ankle soft tissue edema, asymmetric along the medial ankle. Age-indeterminate punctate calcification adjacent to the medial malleolus, potentially a chronic finding or related to small avulsion fracture. Otherwise, no acute displaced fracture. Per discussion with Ortho, nothing to do.  -MM pain control with gabapentin, tylenol, tizanidine, and MS contin. Will attempt oral oxy IR with IV dilaudid for breakthrough pain.  -Fall precautions.  -ok to WBAT, ambulate independently or with CAM boot (ordered). Plan to consult PT/OT on Mon  pending pain control.    Constipation  -Chronic issue.  -Continue lactulose and senna-doc.    Anemia  Anemia is likely due to chronic disease due to sickle cell-beta thalassemia . Most recent hemoglobin and hematocrit are listed below.  Recent Labs     09/13/24  1742 09/14/24  0324 09/15/24  0400   HGB 9.7* 8.6* 8.6*   HCT 28.7* 25.7* 25.5*       Plan  - Monitor serial CBC: Daily  - Transfuse PRBC if patient becomes hemodynamically unstable, symptomatic or H/H drops below 7/21.  - Patient has not received any PRBC transfusions to date  - Patient's anemia is currently stable    Thrombosis of internal carotid, left  See PE      Sickle cell pain crisis  Anemia is likely due to chronic disease due to sickle cell . Most recent hemoglobin and hematocrit are listed below.  Recent Labs     09/13/24  1742 09/14/24  0324 09/15/24  0400   HGB 9.7* 8.6* 8.6*   HCT 28.7* 25.7* 25.5*       Plan  - Monitor serial CBC: Daily  - Transfuse PRBC if patient becomes hemodynamically unstable, symptomatic or H/H drops below 7/21.  - Patient has not received any PRBC transfusions to date  - Patient's anemia is currently stable  - See fall    Hypokalemia  Patient's most recent potassium results are listed below.   Recent Labs     09/13/24  1742 09/14/24  0324 09/15/24  0400   K 3.3* 3.4* 3.6       Plan  - Replete potassium per protocol  - Monitor potassium Daily  - Patient's hypokalemia is stable    Morbid obesity  Body mass index is 44.08 kg/m². Morbid obesity complicates all aspects of disease management from diagnostic modalities to treatment.     History of pulmonary embolism  Thrombosis of internal carotid, left   - continue full dose Enoxaparin    Intermittent asthma without complication  -No s/s of acute exacerbation.  -PRN duo nebs.    Anxiety  -Chronic, stable.  -Continue fluoxetine.    Trigeminal neuralgia  -History noted.   -Continue gabapentin.    Hypertension  Chronic, controlled. Latest blood pressure and vitals reviewed-      Temp:  [98 °F (36.7 °C)-99.1 °F (37.3 °C)]   Pulse:  [81-88]   Resp:  [17-20]   BP: (115-146)/(77-84)   SpO2:  [96 %-98 %] .   Home meds for hypertension were reviewed and noted below.   Hypertension Medications               amLODIPine (NORVASC) 10 MG tablet Take 1 tablet (10 mg total) by mouth once daily.    carvediloL (COREG) 25 MG tablet Take 1 tablet (25 mg total) by mouth 2 (two) times daily.            While in the hospital, will manage blood pressure as follows; Continue home antihypertensive regimen    Will utilize p.r.n. blood pressure medication only if patient's blood pressure greater than 180/110 and she develops symptoms such as worsening chest pain or shortness of breath.    Sickle-cell disease with pain  PMHx of Hypertension, Sickle cell-beta thalassemia disease with pain, and Trigeminal neuralgia who presents with L ankle/lower leg pain and swelling after a ground level fall that occurred yesterday morning. Patient is concerned she is having a sickle cell crisis, although pain appears related to her fall. Pt received  25mg IV benadryl and a total of 6mg IV dilaudid in the ED. Pt also notes fever and mild SOB at home.   - Afebrile, no leukocytosis.  - CXR negative for acute process.  - Flu/RSV/COVID negative  - Labs with Hb 9.7 (at baseline), Retic 2.3, TB 0.7.  - Start multimodial pain regimen - tizanidine 4mg tid prn, tylenol 1,000mg tid, oxy 30 q4h PRN, dilaudid 4mg IV q4h PRN pain unrelieved by PO oxy, continue home gabapentin  - Continue folic acid   - Start D5 .45 NS at 75 ml/hr  - Monitor with daily CBC  - Senna-doc BID, pt intermittently requests Lactulose  - Benadryl and Zofran PRN  - Wean IV pain meds as tolerated to oral as pain diminishes      VTE Risk Mitigation (From admission, onward)           Ordered     enoxaparin injection 105 mg  2 times daily         09/13/24 2326     IP VTE HIGH RISK PATIENT  Once         09/13/24 2153     Place sequential compression device  Until  discontinued         09/13/24 2153                    Discharge Planning   ALYSE: 9/19/2024     Code Status: Full Code   Is the patient medically ready for discharge?:     Reason for patient still in hospital (select all that apply): Treatment                     Shlomo Albert MD  Department of Hospital Medicine   [facility]

## 2024-09-15 NOTE — ASSESSMENT & PLAN NOTE
Anemia is likely due to chronic disease due to sickle cell . Most recent hemoglobin and hematocrit are listed below.  Recent Labs     09/13/24  1742 09/14/24  0324 09/15/24  0400   HGB 9.7* 8.6* 8.6*   HCT 28.7* 25.7* 25.5*       Plan  - Monitor serial CBC: Daily  - Transfuse PRBC if patient becomes hemodynamically unstable, symptomatic or H/H drops below 7/21.  - Patient has not received any PRBC transfusions to date  - Patient's anemia is currently stable  - See fall

## 2024-09-15 NOTE — ASSESSMENT & PLAN NOTE
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 Most recent hemoglobin and hematocrit are listed below.  Recent Labs     09/13/24  1742 09/14/24  0324 09/15/24  0400   HGB 9.7* 8.6* 8.6*   HCT 28.7* 25.7* 25.5*       Plan  - Monitor serial CBC: Daily  - Transfuse PRBC if patient becomes hemodynamically unstable, symptomatic or H/H drops below 7/21.  - Patient has not received any PRBC transfusions to date  - Patient's anemia is currently stable

## 2024-09-15 NOTE — PLAN OF CARE
[facility] - Med Surg  Initial Discharge Assessment       Primary Care Provider: Pee Montgomery MD    Admission Diagnosis: Lower leg pain [M79.669]  Acute chest pain [R07.9]  Sickle cell pain crisis [D57.00]  Chest pain [R07.9]  Acute thigh pain, right [M79.651]    Admission Date: 9/13/2024  Expected Discharge Date: 9/19/2024    Transition of Care Barriers: None    Payor: [employer] MANAGED MEDICARE / Plan: [employer] MEDICARE PLAN HMO / Product Type: Medicare Advantage /     Extended Emergency Contact Information  Primary Emergency Contact: LemaOksana  [employer] Phone: [phone]  Relation: Relative  Preferred language: English   needed? No    Discharge Plan A: Home with family  Discharge Plan B: Home Health      [id number] - [place], [place] - [address]  [address]  [facility] [address]  Phone: [phone] Fax: [phone]    [facility]  [address] [address]  Phone: [phone] Fax: [phone]    [facility]  [address]  [facility] [address]  Phone: [phone] Fax: [phone]    [facility] [id number] [place], [place] - [address]  [address]  [place] [address]  Phone: [phone] Fax: [phone]    [id number] [place] [facility] [address] [facility]  [address]  [place] [address]  Phone: [phone] Fax: [phone]    [id number] [place] [facility] [place] [facility] [address]  [address]  [place] [address]  Phone: [phone] Fax: [phone]      Initial Assessment (most recent)       Adult Discharge Assessment - 09/15/24 1704          Discharge Assessment    Assessment Type Discharge Planning Assessment     Confirmed/corrected address, phone number and insurance Yes     Confirmed Demographics Correct on Facesheet     Source of Information patient     Does patient/caregiver understand observation status Yes     Communicated ALYSE with  patient/caregiver Yes     People in Home parent(s);child(jayce), adult;grandchild(jayce)     Do you expect to return to your current living situation? Yes     Do you have help at home or someone to help you manage your care at home? Yes     Who are your caregiver(s) and their phone number(s)? Mother     Prior to hospitilization cognitive status: Alert/Oriented     Current cognitive status: Alert/Oriented     Home Layout Able to live on 1st floor     Do you take prescription medications? Yes     Do you have prescription coverage? Yes     Do you have any problems affording any of your prescribed medications? No     Is the patient taking medications as prescribed? yes     Who is going to help you get home at discharge? Mother     How do you get to doctors appointments? family or friend will provide     Are you on dialysis? No     Do you take coumadin? No     Discharge Plan A Home with family     Discharge Plan B Home Health     Discharge Plan discussed with: Patient     Transition of Care Barriers None     SDOH --   no       Physical Activity    On average, how many days per week do you engage in moderate to strenuous exercise (like a brisk walk)? 0 days     On average, how many minutes do you engage in exercise at this level? 0 min        Financial Resource Strain    How hard is it for you to pay for the very basics like food, housing, medical care, and heating? Not hard at all        Housing Stability    In the last 12 months, was there a time when you were not able to pay the mortgage or rent on time? No     At any time in the past 12 months, were you homeless or living in a shelter (including now)? No        Transportation Needs    Has the lack of transportation kept you from medical appointments, meetings, work or from getting things needed for daily living? No        Food Insecurity    Within the past 12 months, you worried that your food would run out before you got the money to buy more. Never true     Within the  past 12 months, the food you bought just didn't last and you didn't have money to get more. Never true        Stress    Do you feel stress - tense, restless, nervous, or anxious, or unable to sleep at night because your mind is troubled all the time - these days? Not at all        Social Isolation    How often do you feel lonely or isolated from those around you?  Never        Alcohol Use    Q1: How often do you have a drink containing alcohol? Never     Q2: How many drinks containing alcohol do you have on a typical day when you are drinking? Patient does not drink     Q3: How often do you have six or more drinks on one occasion? Never        Utilities    In the past 12 months has the electric, gas, oil, or water company threatened to shut off services in your home? No        Health Literacy    How often do you need to have someone help you when you read instructions, pamphlets, or other written material from your doctor or pharmacy? Never                      The CM met with the patient at bedside to complete the DPA.  The CM placed name and contact information on the blackboard in the patient's room.  Use preferred pharmacy / bedside delivery for any necessary  medications at the time of discharge.The patient is independent with all ADLs.  The patient uses the following home equipment. The patient is not on Dialysis or Coumadin. The patient's Mother will provide assistance to the patient upon discharge. The patient's Mother will provide transportation upon discharge .  The CM will continue to follow for course of hospitalization.

## 2024-09-15 NOTE — ASSESSMENT & PLAN NOTE
PMHx of Hypertension, Sickle cell-beta thalassemia disease with pain, and Trigeminal neuralgia who presents with L ankle/lower leg pain and swelling after a ground level fall that occurred yesterday morning. Patient is concerned she is having a sickle cell crisis, although pain appears related to her fall. Pt received  25mg IV benadryl and a total of 6mg IV dilaudid in the ED. Pt also notes fever and mild SOB at home.   - Afebrile, no leukocytosis.  - CXR negative for acute process.  - Flu/RSV/COVID negative  - Labs with Hb 9.7 (at baseline), Retic 2.3, TB 0.7.  - Start multimodial pain regimen - tizanidine 4mg tid prn, tylenol 1,000mg tid, oxy 30 q4h PRN, dilaudid 4mg IV q4h PRN pain unrelieved by PO oxy, continue home gabapentin  - Continue folic acid   - Start D5 .45 NS at 75 ml/hr  - Monitor with daily CBC  - Senna-doc BID, pt intermittently requests Lactulose  - Benadryl and Zofran PRN  - Wean IV pain meds as tolerated to oral as pain diminishes

## 2024-09-15 NOTE — ASSESSMENT & PLAN NOTE
Patient's most recent potassium results are listed below.   Recent Labs     09/13/24  1742 09/14/24  0324 09/15/24  0400   K 3.3* 3.4* 3.6       Plan  - Replete potassium per protocol  - Monitor potassium Daily  - Patient's hypokalemia is stable

## 2024-09-16 LAB
ALBUMIN SERPL BCP-MCNC: 3.2 G/DL (ref 3.5–5.2)
ALP SERPL-CCNC: 70 U/L (ref 55–135)
ALT SERPL W/O P-5'-P-CCNC: 12 U/L (ref 10–44)
ANION GAP SERPL CALC-SCNC: 6 MMOL/L (ref 8–16)
AST SERPL-CCNC: 19 U/L (ref 10–40)
BASOPHILS # BLD AUTO: 0.06 K/UL (ref 0–0.2)
BASOPHILS NFR BLD: 0.6 % (ref 0–1.9)
BILIRUB SERPL-MCNC: 0.5 MG/DL (ref 0.1–1)
BUN SERPL-MCNC: 12 MG/DL (ref 6–20)
CALCIUM SERPL-MCNC: 8.9 MG/DL (ref 8.7–10.5)
CHLORIDE SERPL-SCNC: 106 MMOL/L (ref 95–110)
CO2 SERPL-SCNC: 27 MMOL/L (ref 23–29)
CREAT SERPL-MCNC: 1.2 MG/DL (ref 0.5–1.4)
DIFFERENTIAL METHOD BLD: ABNORMAL
EOSINOPHIL # BLD AUTO: 0.4 K/UL (ref 0–0.5)
EOSINOPHIL NFR BLD: 4.4 % (ref 0–8)
ERYTHROCYTE [DISTWIDTH] IN BLOOD BY AUTOMATED COUNT: 20.4 % (ref 11.5–14.5)
EST. GFR  (NO RACE VARIABLE): 56.5 ML/MIN/1.73 M^2
GLUCOSE SERPL-MCNC: 119 MG/DL (ref 70–110)
HCT VFR BLD AUTO: 25.6 % (ref 37–48.5)
HGB BLD-MCNC: 8 G/DL (ref 12–16)
IMM GRANULOCYTES # BLD AUTO: 0.05 K/UL (ref 0–0.04)
IMM GRANULOCYTES NFR BLD AUTO: 0.5 % (ref 0–0.5)
LYMPHOCYTES # BLD AUTO: 3.3 K/UL (ref 1–4.8)
LYMPHOCYTES NFR BLD: 33.4 % (ref 18–48)
MAGNESIUM SERPL-MCNC: 1.8 MG/DL (ref 1.6–2.6)
MCH RBC QN AUTO: 24.6 PG (ref 27–31)
MCHC RBC AUTO-ENTMCNC: 31.3 G/DL (ref 32–36)
MCV RBC AUTO: 79 FL (ref 82–98)
MONOCYTES # BLD AUTO: 1.3 K/UL (ref 0.3–1)
MONOCYTES NFR BLD: 12.9 % (ref 4–15)
NEUTROPHILS # BLD AUTO: 4.7 K/UL (ref 1.8–7.7)
NEUTROPHILS NFR BLD: 48.2 % (ref 38–73)
NRBC BLD-RTO: 4 /100 WBC
PLATELET # BLD AUTO: 413 K/UL (ref 150–450)
PMV BLD AUTO: 11.2 FL (ref 9.2–12.9)
POTASSIUM SERPL-SCNC: 3.5 MMOL/L (ref 3.5–5.1)
PROT SERPL-MCNC: 6.8 G/DL (ref 6–8.4)
RBC # BLD AUTO: 3.25 M/UL (ref 4–5.4)
RETICS/RBC NFR AUTO: 2 % (ref 0.5–2.5)
SODIUM SERPL-SCNC: 139 MMOL/L (ref 136–145)
WBC # BLD AUTO: 9.75 K/UL (ref 3.9–12.7)

## 2024-09-16 PROCEDURE — 80053 COMPREHEN METABOLIC PANEL: CPT | Performed by: NURSE PRACTITIONER

## 2024-09-16 PROCEDURE — 83735 ASSAY OF MAGNESIUM: CPT | Performed by: NURSE PRACTITIONER

## 2024-09-16 PROCEDURE — S5010 5% DEXTROSE AND 0.45% SALINE: HCPCS

## 2024-09-16 PROCEDURE — 85025 COMPLETE CBC W/AUTO DIFF WBC: CPT | Performed by: NURSE PRACTITIONER

## 2024-09-16 PROCEDURE — 97165 OT EVAL LOW COMPLEX 30 MIN: CPT

## 2024-09-16 PROCEDURE — 97161 PT EVAL LOW COMPLEX 20 MIN: CPT

## 2024-09-16 PROCEDURE — 25000003 PHARM REV CODE 250

## 2024-09-16 PROCEDURE — 63600175 PHARM REV CODE 636 W HCPCS

## 2024-09-16 PROCEDURE — 21400001 HC TELEMETRY ROOM

## 2024-09-16 PROCEDURE — 25000003 PHARM REV CODE 250: Performed by: NURSE PRACTITIONER

## 2024-09-16 PROCEDURE — 85045 AUTOMATED RETICULOCYTE COUNT: CPT | Performed by: NURSE PRACTITIONER

## 2024-09-16 RX ORDER — DEXTROSE MONOHYDRATE AND SODIUM CHLORIDE 5; .45 G/100ML; G/100ML
INJECTION, SOLUTION INTRAVENOUS CONTINUOUS
Status: ACTIVE | OUTPATIENT
Start: 2024-09-16 | End: 2024-09-18

## 2024-09-16 RX ADMIN — DIPHENHYDRAMINE HYDROCHLORIDE 50 MG: 50 INJECTION, SOLUTION INTRAMUSCULAR; INTRAVENOUS at 12:09

## 2024-09-16 RX ADMIN — ACETAMINOPHEN 1000 MG: 500 TABLET ORAL at 10:09

## 2024-09-16 RX ADMIN — DEXTROSE AND SODIUM CHLORIDE: 5; 450 INJECTION, SOLUTION INTRAVENOUS at 08:09

## 2024-09-16 RX ADMIN — GABAPENTIN 400 MG: 400 CAPSULE ORAL at 08:09

## 2024-09-16 RX ADMIN — OXYCODONE HYDROCHLORIDE 30 MG: 10 TABLET ORAL at 07:09

## 2024-09-16 RX ADMIN — SENNOSIDES AND DOCUSATE SODIUM 1 TABLET: 50; 8.6 TABLET ORAL at 08:09

## 2024-09-16 RX ADMIN — CARVEDILOL 25 MG: 25 TABLET, FILM COATED ORAL at 08:09

## 2024-09-16 RX ADMIN — ACETAMINOPHEN 1000 MG: 500 TABLET ORAL at 02:09

## 2024-09-16 RX ADMIN — OXYCODONE HYDROCHLORIDE 30 MG: 10 TABLET ORAL at 08:09

## 2024-09-16 RX ADMIN — FOLIC ACID 1 MG: 1 TABLET ORAL at 08:09

## 2024-09-16 RX ADMIN — HYDROMORPHONE HYDROCHLORIDE 4 MG: 1 INJECTION, SOLUTION INTRAMUSCULAR; INTRAVENOUS; SUBCUTANEOUS at 10:09

## 2024-09-16 RX ADMIN — MORPHINE SULFATE 30 MG: 30 TABLET, FILM COATED, EXTENDED RELEASE ORAL at 08:09

## 2024-09-16 RX ADMIN — AMLODIPINE BESYLATE 10 MG: 10 TABLET ORAL at 08:09

## 2024-09-16 RX ADMIN — DEXTROSE AND SODIUM CHLORIDE: 5; 450 INJECTION, SOLUTION INTRAVENOUS at 06:09

## 2024-09-16 RX ADMIN — HYDROMORPHONE HYDROCHLORIDE 4 MG: 1 INJECTION, SOLUTION INTRAMUSCULAR; INTRAVENOUS; SUBCUTANEOUS at 06:09

## 2024-09-16 RX ADMIN — ENOXAPARIN SODIUM 80 MG: 100 INJECTION SUBCUTANEOUS at 08:09

## 2024-09-16 RX ADMIN — FLUOXETINE HYDROCHLORIDE 40 MG: 20 CAPSULE ORAL at 08:09

## 2024-09-16 RX ADMIN — HYDROMORPHONE HYDROCHLORIDE 4 MG: 1 INJECTION, SOLUTION INTRAMUSCULAR; INTRAVENOUS; SUBCUTANEOUS at 02:09

## 2024-09-16 RX ADMIN — OXYCODONE HYDROCHLORIDE 30 MG: 10 TABLET ORAL at 04:09

## 2024-09-16 RX ADMIN — ACETAMINOPHEN 1000 MG: 500 TABLET ORAL at 06:09

## 2024-09-16 RX ADMIN — DIPHENHYDRAMINE HYDROCHLORIDE 50 MG: 50 INJECTION, SOLUTION INTRAMUSCULAR; INTRAVENOUS at 06:09

## 2024-09-16 RX ADMIN — FLUTICASONE PROPIONATE 50 MCG: 50 SPRAY, METERED NASAL at 08:09

## 2024-09-16 RX ADMIN — OXYCODONE HYDROCHLORIDE 30 MG: 10 TABLET ORAL at 11:09

## 2024-09-16 NOTE — ASSESSMENT & PLAN NOTE
Chronic, controlled. Latest blood pressure and vitals reviewed-     Temp:  [98 °F (36.7 °C)-99.3 °F (37.4 °C)]   Pulse:  [83-96]   Resp:  [16-20]   BP: ()/(60-73)   SpO2:  [94 %-97 %] .   Home meds for hypertension were reviewed and noted below.   Hypertension Medications               amLODIPine (NORVASC) 10 MG tablet Take 1 tablet (10 mg total) by mouth once daily.    carvediloL (COREG) 25 MG tablet Take 1 tablet (25 mg total) by mouth 2 (two) times daily.            While in the hospital, will manage blood pressure as follows; Continue home antihypertensive regimen    Will utilize p.r.n. blood pressure medication only if patient's blood pressure greater than 180/110 and she develops symptoms such as worsening chest pain or shortness of breath.

## 2024-09-16 NOTE — ASSESSMENT & PLAN NOTE
Patient's most recent potassium results are listed below.   Recent Labs     09/14/24  0324 09/15/24  0400 09/16/24  0400   K 3.4* 3.6 3.5       Plan  - Replete potassium per protocol  - Monitor potassium Daily  - Patient's hypokalemia is stable

## 2024-09-16 NOTE — ASSESSMENT & PLAN NOTE
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 Most recent hemoglobin and hematocrit are listed below.  Recent Labs     09/14/24  0324 09/15/24  0400 09/16/24  0400   HGB 8.6* 8.6* 8.0*   HCT 25.7* 25.5* 25.6*       Plan  - Monitor serial CBC: Daily  - Transfuse PRBC if patient becomes hemodynamically unstable, symptomatic or H/H drops below 7/21.  - Patient has not received any PRBC transfusions to date  - Patient's anemia is currently stable

## 2024-09-16 NOTE — PLAN OF CARE
PT eval completed- see note for details, goals and POC established.     Problem: Physical Therapy  Goal: Physical Therapy Goal  Description: Goals to be met by: 10/16/24     Patient will increase functional independence with mobility by performin. Gait  x 150 feet with Supervision using No Assistive Device or LRAD.   2. Ascend/descend 2 stair with bilateral Handrails Supervision using No Assistive Device.     Outcome: Progressing   2024

## 2024-09-16 NOTE — ASSESSMENT & PLAN NOTE
Pt present with trauma to her right upper/lower leg and her left lower leg/ankle after a fall. She reports she was trying to prevent her mother with dementia from getting out of bed, but her mother pulled away causing her to fall forward. She also notes striking her head. Denies LOC, headache, or vision changes. She was having severe pain at these areas that is not improved with MS contin and Percocet which she takes for sickle cell disease. No obvious deformity on exam.    -Xray bilateral tib/fib with no convincing acute displaced fracture or dislocation of the left or right tibia/fibula. Edema overlies the proximal aspect of the right tibia.  -Xray R femur with subtle cortical irregularity at the level of the proximal femoral metaphysis, not confirmed on the orthogonal views.   -Xray L ankle with moderate ankle soft tissue edema, asymmetric along the medial ankle. Age-indeterminate punctate calcification adjacent to the medial malleolus, potentially a chronic finding or related to small avulsion fracture. Otherwise, no acute displaced fracture. Per discussion with Ortho, nothing to do.  -MM pain control with gabapentin, tylenol, tizanidine, and MS contin. Will attempt oral oxy IR with IV dilaudid for breakthrough pain.  -Fall precautions.  -ok to WBAT, ambulate independently or with CAM boot (ordered, delivered). PT/OT with recs for no therapy.

## 2024-09-16 NOTE — PT/OT/SLP EVAL
Physical Therapy Co-Evaluation and Treatment    OT present for coeval due to pt's multiple medical comorbidities and functional/cognition deficits requiring two skilled therapists to appropriately progress pt's musculoskeletal strength, neuromuscular control, and endurance while taking into consideration medical acuity and pt safety.      Patient Name:  Nazanin Malone   MRN:  [id number]    Recommendations:     Discharge Recommendations: No Therapy Indicated   Discharge Equipment Recommendations: none   Barriers to discharge: None    Assessment:     Nazanin Malone is a 44 y.o. female admitted with a medical diagnosis of Fall.  She presents with the following impairments/functional limitations: orthopedic precautions, pain, weakness, gait instability     Pt receptive and tolerated PT co-eval with OT well. Pt able to amb with CGA using HHA to supervision while wearing CAM boot. Pt decline wanting to attempt stairs this session due to pain.    Rehab Prognosis: Good; patient would benefit from acute skilled PT services to address these deficits and reach maximum level of function.    Recent Surgery: * No surgery found *      Plan:     During this hospitalization, patient to be seen 3 x/week to address the identified rehab impairments via gait training, therapeutic activities, therapeutic exercises, neuromuscular re-education and progress toward the following goals:    Plan of Care Expires:  10/16/24    Subjective     Chief Complaint: L lower leg pain  Patient/Family Comments/goals: Pt needed to use the bathroom  Pain/Comfort:  Pain Rating 1: 10/10  Location - Side 1: Left  Location - Orientation 1: lower  Location 1: leg  Pain Addressed 1: Reposition, Distraction, Cessation of Activity  Pain Rating Post-Intervention 1: 10/10    Patients cultural, spiritual, [employer] conflicts given the current situation: no    Patient History:     Living Environment: Pt lives with mother, daughter, and grand daughter in [facility] with 2  PONCE [employer].  Bathroom: tub/shower combo   Prior Level of Function: IND with amb and ADLs  DME owned: none  Caregiver Assistance: family    Objective:     Communicated with RN prior to session.  Patient found HOB elevated with PureWick, peripheral IV  upon PT entry to room.    General Precautions: Standard, fall  Orthopedic Precautions:N/A   Braces:  (Long CAM boot)  Respiratory Status: Room air    Exams:  Gross Motor Coordination:  WFL  Sensation:    -       Intact  RLE ROM: WFL  RLE Strength: WFL  LLE ROM: WFL  LLE Strength: WFL    Functional Mobility:    Bed Mobility:   Supine > Sit: supervision  Sit > Supine: supervision    Transfers:   Sit <> Stand Transfer: contact guard assistance from EOB HHA  Toilet Transfer: supervision without AD    Balance:   Sitting balance: GOOD-: Incosistently Maintains balance through MODERATE excursions of active trunk movement,     Standing balance:   FAIR: Maintains without assist but unable to take challenges  GOOD-: Needs SUPERVISION only during gait and able to self right with moderate LOB inconsistently                 Gait:  Distance: 12 ft x2 to the bathroom then back to bed   Assistive Device: no AD  Assistance Level: contact guard assistance progressing to supervision  Gait Assessment: Pt amb with antalgic limp due to pain      AM-PAC 6 CLICK MOBILITY  Total Score:23       Treatment & Education:  Pt educated on tip to reduce fall risk and safety with mobility and using call button for assistance from nursing staff with OOB mobility.  Pt educated on sitting up in chair throughout most of day  Pt educated on amb 2-3x per day with assistance from staff and increased movement during hospital stay.  All questions answered within the scope of PT.  White board updated accordingly.    Patient left HOB elevated with all lines intact and call button in reach.    GOALS:   Multidisciplinary Problems       Physical Therapy Goals          Problem: Physical Therapy    Goal Priority  Disciplines Outcome Goal Variances Interventions   Physical Therapy Goal     PT, PT/OT Progressing     Description: Goals to be met by: 10/16/24     Patient will increase functional independence with mobility by performin. Gait  x 150 feet with Supervision using No Assistive Device or LRAD.   2. Ascend/descend 2 stair with bilateral Handrails Supervision using No Assistive Device.                          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

## 2024-09-16 NOTE — PLAN OF CARE
Problem: Adult Inpatient Plan of Care  Goal: Plan of Care Review  Outcome: Progressing  Goal: Patient-Specific Goal (Individualized)  Outcome: Progressing  Goal: Absence of Hospital-Acquired Illness or Injury  Outcome: Progressing  Goal: Optimal Comfort and Wellbeing  Outcome: Progressing  Goal: Readiness for Transition of Care  Outcome: Progressing     Problem: Bariatric Environmental Safety  Goal: Safety Maintained with Care  Outcome: Progressing     Problem: Acute Kidney Injury/Impairment  Goal: Fluid and Electrolyte Balance  Outcome: Progressing  Goal: Improved Oral Intake  Outcome: Progressing  Goal: Effective Renal Function  Outcome: Progressing

## 2024-09-16 NOTE — PT/OT/SLP EVAL
"Occupational Therapy   Evaluation and Discharge Note    Name: Nazanin Malone  MRN: [id number]  Admitting Diagnosis: Fall  Recent Surgery: * No surgery found *      Recommendations:     Discharge Recommendations: No Therapy Indicated  Discharge Equipment Recommendations: none  Barriers to discharge:  None    Assessment:   CO-TX with PT for pt safety and max participation with both disciplines.     Nazanin Malone is a 44 y.o. female with a medical diagnosis of Fall. At this time, patient is functioning at their prior level of function and does not require further acute OT services.     Plan:     During this hospitalization, patient does not require further acute OT services.  Please re-consult if situation changes.    Plan of Care Reviewed with: patient    Subjective     Chief Complaint: L LE pain; "Feels like big knot on my leg"  Patient/Family Comments/goals: return home    Occupational Profile:  Living Environment: Lives with mother, daughter, and grand dtr, in [facility], 2 PONCE and BL hand rail, tub/shower in bathroom  Previous level of function: Indp  Roles and Routines: Mother, grandmother  Equipment Used at home: none  Assistance upon Discharge: family    Pain/Comfort:  Pain Rating 1: 10/10  Location - Side 1: Left  Location - Orientation 1: lower  Location 1: leg  Pain Addressed 1: Pre-medicate for activity, Reposition, Cessation of Activity    Patients cultural, spiritual, Baptism conflicts given the current situation: no    Objective:     Communicated with: Nsg prior to session.  Patient found HOB elevated with PureWick, peripheral IV (CAM Boot) upon OT entry to room.    General Precautions: Standard, fall  Orthopedic Precautions: N/A  Braces:  (CAM boot)  Respiratory Status: Room air     Occupational Performance:    Bed Mobility:    Patient completed Supine to Sit with supervision  Patient completed Sit to Supine with supervision    Functional Mobility/Transfers:  Patient completed Sit <> Stand Transfer " with HHA for initiation from EOB and Sup from toilet  with  no assistive device   Patient completed Toilet Transfer Step Transfer technique with supervision with  no AD  Chair t/f c/ Sup  Chair> Bed t/f c/ Sup  Functional Mobility: Pt ambulated room level with HHA faded to Sup and no AD (refer to PT note)    Activities of Daily Living:  Grooming: supervision hand H at sink  Lower Body Dressing: supervision adjust sock  Toileting: supervision      Cognitive/Visual Perceptual:  A&O x4    Physical Exam:  BUE WNL  Balance: Fair +    AMPAC 6 Click ADL:  AMPAC Total Score: 24    Treatment & Education:  Pt educated on role and purpose of therapy  Pt educated on goal setting  Pt educated on benefits of OOB activity  Pt educated on self advocacy     Patient left HOB elevated with all lines intact, call button in reach, and nsg notified    GOALS:   Multidisciplinary Problems       Occupational Therapy Goals       Not on file              Multidisciplinary Problems (Resolved)          Problem: Occupational Therapy    Goal Priority Disciplines Outcome Interventions   Occupational Therapy Goal   (Resolved)     OT, PT/OT Met                        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

## 2024-09-16 NOTE — PLAN OF CARE
AAOX4, fluids running at 100 ml/hr, pain medication given, bed in lowest position, bed locked, side rails x 2, and call light with in reach. Patient was instructed and educated on the importance to call when getting OOB.    Problem: Adult Inpatient Plan of Care  Goal: Plan of Care Review  Outcome: Progressing  Goal: Patient-Specific Goal (Individualized)  Outcome: Progressing  Goal: Absence of Hospital-Acquired Illness or Injury  Outcome: Progressing  Goal: Optimal Comfort and Wellbeing  Outcome: Progressing  Goal: Readiness for Transition of Care  Outcome: Progressing     Problem: Bariatric Environmental Safety  Goal: Safety Maintained with Care  Outcome: Progressing     Problem: Acute Kidney Injury/Impairment  Goal: Fluid and Electrolyte Balance  Outcome: Progressing  Goal: Improved Oral Intake  Outcome: Progressing  Goal: Effective Renal Function  Outcome: Progressing     Problem: Pneumonia  Goal: Fluid Balance  Outcome: Progressing  Goal: Resolution of Infection Signs and Symptoms  Outcome: Progressing  Goal: Effective Oxygenation and Ventilation  Outcome: Progressing     Problem: Infection  Goal: Absence of Infection Signs and Symptoms  Outcome: Progressing     Problem: Wound  Goal: Optimal Coping  Outcome: Progressing  Goal: Optimal Functional Ability  Outcome: Progressing  Goal: Absence of Infection Signs and Symptoms  Outcome: Progressing  Goal: Improved Oral Intake  Outcome: Progressing  Goal: Optimal Pain Control and Function  Outcome: Progressing  Goal: Skin Health and Integrity  Outcome: Progressing  Goal: Optimal Wound Healing  Outcome: Progressing

## 2024-09-16 NOTE — ASSESSMENT & PLAN NOTE
Anemia is likely due to chronic disease due to sickle cell . Most recent hemoglobin and hematocrit are listed below.  Recent Labs     09/14/24  0324 09/15/24  0400 09/16/24  0400   HGB 8.6* 8.6* 8.0*   HCT 25.7* 25.5* 25.6*       Plan  - Monitor serial CBC: Daily  - Transfuse PRBC if patient becomes hemodynamically unstable, symptomatic or H/H drops below 7/21.  - Patient has not received any PRBC transfusions to date  - Patient's anemia is currently stable  - See fall

## 2024-09-16 NOTE — SUBJECTIVE & OBJECTIVE
Interval History: NAEON. Worked with PT/OT to good effect today. Stable pain control regimen.    Review of Systems   Constitutional:  Negative for activity change, appetite change, diaphoresis and fever.   Respiratory:  Negative for cough, shortness of breath and wheezing.    Cardiovascular:  Negative for chest pain and leg swelling.   Gastrointestinal:  Negative for abdominal pain, constipation, diarrhea, nausea and vomiting.   Musculoskeletal:  Positive for arthralgias, back pain and myalgias.   Skin:  Negative for rash.   Neurological:  Negative for headaches.     Objective:     Vital Signs (Most Recent):  Temp: 98.7 °F (37.1 °C) (09/16/24 1126)  Pulse: 83 (09/16/24 1126)  Resp: 18 (09/16/24 1429)  BP: 119/65 (09/16/24 1126)  SpO2: 97 % (09/16/24 1126) Vital Signs (24h Range):  Temp:  [98 °F (36.7 °C)-99.3 °F (37.4 °C)] 98.7 °F (37.1 °C)  Pulse:  [83-96] 83  Resp:  [16-20] 18  SpO2:  [94 %-97 %] 97 %  BP: ()/(60-73) 119/65     Weight: 109.3 kg (241 lb)  Body mass index is 44.08 kg/m².    Intake/Output Summary (Last 24 hours) at 9/16/2024 1607  Last data filed at 9/15/2024 1829  Gross per 24 hour   Intake 300 ml   Output 200 ml   Net 100 ml         Physical Exam  Vitals and nursing note reviewed.   HENT:      Head: Normocephalic and atraumatic.   Cardiovascular:      Rate and Rhythm: Normal rate and regular rhythm.      Pulses: Normal pulses.   Pulmonary:      Effort: Pulmonary effort is normal. No respiratory distress.      Breath sounds: No wheezing or rales.   Abdominal:      Palpations: Abdomen is soft.      Tenderness: There is no abdominal tenderness. There is no guarding.   Skin:     General: Skin is warm and dry.      Findings: Bruising present.      Comments: Contusion to R lateral thigh, L medial ankle   Neurological:      General: No focal deficit present.      Mental Status: She is alert and oriented to person, place, and time.   Psychiatric:         Mood and Affect: Mood normal.              Significant Labs: All pertinent labs within the past 24 hours have been reviewed.    Significant Imaging: I have reviewed all pertinent imaging results/findings within the past 24 hours.

## 2024-09-17 LAB — FACT X PPP CHRO-ACNC: 1.23 IU/ML (ref 0.3–0.7)

## 2024-09-17 PROCEDURE — 63600175 PHARM REV CODE 636 W HCPCS

## 2024-09-17 PROCEDURE — 25000003 PHARM REV CODE 250: Performed by: NURSE PRACTITIONER

## 2024-09-17 PROCEDURE — 25000003 PHARM REV CODE 250

## 2024-09-17 PROCEDURE — 85520 HEPARIN ASSAY: CPT

## 2024-09-17 PROCEDURE — S5010 5% DEXTROSE AND 0.45% SALINE: HCPCS

## 2024-09-17 PROCEDURE — 21400001 HC TELEMETRY ROOM

## 2024-09-17 RX ORDER — PROCHLORPERAZINE EDISYLATE 5 MG/ML
5 INJECTION INTRAMUSCULAR; INTRAVENOUS EVERY 4 HOURS PRN
Status: DISCONTINUED | OUTPATIENT
Start: 2024-09-17 | End: 2024-09-19 | Stop reason: HOSPADM

## 2024-09-17 RX ADMIN — HYDROMORPHONE HYDROCHLORIDE 4 MG: 1 INJECTION, SOLUTION INTRAMUSCULAR; INTRAVENOUS; SUBCUTANEOUS at 11:09

## 2024-09-17 RX ADMIN — OXYCODONE HYDROCHLORIDE 30 MG: 10 TABLET ORAL at 06:09

## 2024-09-17 RX ADMIN — PROCHLORPERAZINE EDISYLATE 5 MG: 5 INJECTION INTRAMUSCULAR; INTRAVENOUS at 02:09

## 2024-09-17 RX ADMIN — DIPHENHYDRAMINE HYDROCHLORIDE 50 MG: 50 INJECTION, SOLUTION INTRAMUSCULAR; INTRAVENOUS at 11:09

## 2024-09-17 RX ADMIN — HYDROMORPHONE HYDROCHLORIDE 4 MG: 1 INJECTION, SOLUTION INTRAMUSCULAR; INTRAVENOUS; SUBCUTANEOUS at 03:09

## 2024-09-17 RX ADMIN — CARVEDILOL 25 MG: 25 TABLET, FILM COATED ORAL at 08:09

## 2024-09-17 RX ADMIN — OXYCODONE HYDROCHLORIDE 30 MG: 10 TABLET ORAL at 02:09

## 2024-09-17 RX ADMIN — AMLODIPINE BESYLATE 10 MG: 10 TABLET ORAL at 09:09

## 2024-09-17 RX ADMIN — SENNOSIDES AND DOCUSATE SODIUM 1 TABLET: 50; 8.6 TABLET ORAL at 08:09

## 2024-09-17 RX ADMIN — OXYCODONE HYDROCHLORIDE 30 MG: 10 TABLET ORAL at 05:09

## 2024-09-17 RX ADMIN — ACETAMINOPHEN 1000 MG: 500 TABLET ORAL at 08:09

## 2024-09-17 RX ADMIN — ENOXAPARIN SODIUM 80 MG: 100 INJECTION SUBCUTANEOUS at 09:09

## 2024-09-17 RX ADMIN — DIPHENHYDRAMINE HYDROCHLORIDE 50 MG: 50 INJECTION, SOLUTION INTRAMUSCULAR; INTRAVENOUS at 07:09

## 2024-09-17 RX ADMIN — MORPHINE SULFATE 30 MG: 30 TABLET, FILM COATED, EXTENDED RELEASE ORAL at 08:09

## 2024-09-17 RX ADMIN — OXYCODONE HYDROCHLORIDE 30 MG: 10 TABLET ORAL at 10:09

## 2024-09-17 RX ADMIN — FOLIC ACID 1 MG: 1 TABLET ORAL at 08:09

## 2024-09-17 RX ADMIN — GABAPENTIN 400 MG: 400 CAPSULE ORAL at 08:09

## 2024-09-17 RX ADMIN — DEXTROSE AND SODIUM CHLORIDE: 5; 450 INJECTION, SOLUTION INTRAVENOUS at 03:09

## 2024-09-17 RX ADMIN — HYDROMORPHONE HYDROCHLORIDE 4 MG: 1 INJECTION, SOLUTION INTRAMUSCULAR; INTRAVENOUS; SUBCUTANEOUS at 07:09

## 2024-09-17 RX ADMIN — ACETAMINOPHEN 1000 MG: 500 TABLET ORAL at 05:09

## 2024-09-17 RX ADMIN — FLUOXETINE HYDROCHLORIDE 40 MG: 20 CAPSULE ORAL at 08:09

## 2024-09-17 RX ADMIN — ENOXAPARIN SODIUM 80 MG: 100 INJECTION SUBCUTANEOUS at 08:09

## 2024-09-17 RX ADMIN — DIPHENHYDRAMINE HYDROCHLORIDE 50 MG: 50 INJECTION, SOLUTION INTRAMUSCULAR; INTRAVENOUS at 12:09

## 2024-09-17 RX ADMIN — OXYCODONE HYDROCHLORIDE 30 MG: 10 TABLET ORAL at 09:09

## 2024-09-17 RX ADMIN — SENNOSIDES AND DOCUSATE SODIUM 1 TABLET: 50; 8.6 TABLET ORAL at 09:09

## 2024-09-17 RX ADMIN — FLUTICASONE PROPIONATE 50 MCG: 50 SPRAY, METERED NASAL at 09:09

## 2024-09-17 RX ADMIN — OXYCODONE HYDROCHLORIDE 30 MG: 10 TABLET ORAL at 12:09

## 2024-09-17 NOTE — PLAN OF CARE
09/17/24 0933   Discharge Reassessment   Assessment Type Discharge Planning Reassessment   Did the patient's condition or plan change since previous assessment? No   Discharge Plan discussed with: Patient   Communicated ALYSE with patient/caregiver Yes   Discharge Plan A Home with family   Discharge Plan B Home   DME Needed Upon Discharge  none   Transition of Care Barriers None   Why the patient remains in the hospital Requires continued medical care   Post-Acute Status   Post-Acute Authorization Other   Other Status No Post-Acute Service Needs   Hospital Resources/Appts/Education Provided Appointments scheduled and added to AVS   Discharge Delays None known at this time

## 2024-09-17 NOTE — ASSESSMENT & PLAN NOTE
Chronic, controlled. Latest blood pressure and vitals reviewed-     Temp:  [98.6 °F (37 °C)-99.4 °F (37.4 °C)]   Pulse:  [82-92]   Resp:  [14-18]   BP: (102-132)/(60-87)   SpO2:  [93 %-97 %] .   Home meds for hypertension were reviewed and noted below.   Hypertension Medications               amLODIPine (NORVASC) 10 MG tablet Take 1 tablet (10 mg total) by mouth once daily.    carvediloL (COREG) 25 MG tablet Take 1 tablet (25 mg total) by mouth 2 (two) times daily.            While in the hospital, will manage blood pressure as follows; Continue home antihypertensive regimen    Will utilize p.r.n. blood pressure medication only if patient's blood pressure greater than 180/110 and she develops symptoms such as worsening chest pain or shortness of breath.

## 2024-09-17 NOTE — SUBJECTIVE & OBJECTIVE
Interval History: reporting pain in RLE overnight. Maintaining pain regimen. Reports she would like to try and DC on Wed    Review of Systems   Constitutional:  Negative for activity change, appetite change, diaphoresis and fever.   Respiratory:  Negative for cough, shortness of breath and wheezing.    Cardiovascular:  Negative for chest pain and leg swelling.   Gastrointestinal:  Negative for abdominal pain, constipation, diarrhea, nausea and vomiting.   Musculoskeletal:  Positive for arthralgias, back pain and myalgias.   Skin:  Negative for rash.   Neurological:  Negative for headaches.     Objective:     Vital Signs (Most Recent):  Temp: 98.6 °F (37 °C) (09/17/24 1207)  Pulse: 82 (09/17/24 1207)  Resp: 16 (09/17/24 1542)  BP: 132/87 (09/17/24 1207)  SpO2: (!) 93 % (09/17/24 1207) Vital Signs (24h Range):  Temp:  [98.6 °F (37 °C)-99.4 °F (37.4 °C)] 98.6 °F (37 °C)  Pulse:  [82-92] 82  Resp:  [14-18] 16  SpO2:  [93 %-97 %] 93 %  BP: (102-132)/(60-87) 132/87     Weight: 109.3 kg (241 lb)  Body mass index is 44.08 kg/m².    Intake/Output Summary (Last 24 hours) at 9/17/2024 1543  Last data filed at 9/17/2024 0557  Gross per 24 hour   Intake --   Output 1640 ml   Net -1640 ml         Physical Exam  Vitals and nursing note reviewed.   HENT:      Head: Normocephalic and atraumatic.   Cardiovascular:      Rate and Rhythm: Normal rate and regular rhythm.      Pulses: Normal pulses.   Pulmonary:      Effort: Pulmonary effort is normal. No respiratory distress.      Breath sounds: No wheezing or rales.   Abdominal:      Palpations: Abdomen is soft.      Tenderness: There is no abdominal tenderness. There is no guarding.   Skin:     General: Skin is warm and dry.      Findings: Bruising present.      Comments: Contusion to R lateral thigh, L medial ankle   Neurological:      General: No focal deficit present.      Mental Status: She is alert and oriented to person, place, and time.   Psychiatric:         Mood and Affect:  Mood normal.             Significant Labs: All pertinent labs within the past 24 hours have been reviewed.    Significant Imaging: I have reviewed all pertinent imaging results/findings within the past 24 hours.

## 2024-09-17 NOTE — PT/OT/SLP PROGRESS
Physical Therapy      Patient Name:  Nazanin Malone   MRN:  [id number]    Patient not seen today secondary to Pain when attempted this AM at 1006 and this PM 1348 Will follow-up per POC.    9/17/2024

## 2024-09-17 NOTE — PLAN OF CARE
09/17/24 0933   Post-Acute Status   Post-Acute Authorization Other   Other Status No Post-Acute Service Needs   Hospital Resources/Appts/Education Provided Appointments scheduled and added to AVS   Discharge Delays None known at this time   Discharge Plan   Discharge Plan A Home with family   Discharge Plan B Home

## 2024-09-17 NOTE — ASSESSMENT & PLAN NOTE
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 Most recent hemoglobin and hematocrit are listed below.  Recent Labs     09/15/24  0400 09/16/24  0400   HGB 8.6* 8.0*   HCT 25.5* 25.6*       Plan  - Monitor serial CBC: Daily  - Transfuse PRBC if patient becomes hemodynamically unstable, symptomatic or H/H drops below 7/21.  - Patient has not received any PRBC transfusions to date  - Patient's anemia is currently stable

## 2024-09-17 NOTE — PROGRESS NOTES
[facility] Medicine  Progress Note    Patient Name: Nazanin Malone  MRN: [id number]  Patient Class: IP- Inpatient   Admission Date: 9/13/2024  Length of Stay: 3 days  Attending Physician: Shlomo Albert MD  Primary Care Provider: Pee Montgomery MD        Subjective:     Principal Problem:Fall        HPI:  Nazanin Malone is a 44 y.o. 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 She reports she was trying to prevent her mother with dementia from getting out of bed, but her mother pulled away causing her to fall forward. This resulted in pain to her right upper/lower leg and her left lower leg/ankle. She also notes striking her head. Denies LOC, headache, or vision changes. She was having severe pain to her legs that is not improved with MS contin and Percocet which she takes for sickle cell disease. The patient reports fever and mild SOB since yesterday but denies cough, chest pain, dizziness, N/V/D, or dysuria.    In the ED, VSS, afebrile. Hgb 9.7 (at baseline). Retic count 2.3. K+3.3. BNP 57. Troponin <0.006. EKG shows SR w/ non specific T wave abnormality, 87 bpm, prolonged QTc 478. UA pending. CXR with interstitial findings accentuated by habitus, no large focal consolidation. Xray bilateral tib/fib with no convincing acute displaced fracture or dislocation of the left or right tibia/fibula. Edema overlies the proximal aspect of the right tibia. Xray R femur with subtle cortical irregularity at the level of the proximal femoral metaphysis, not confirmed on the orthogonal views. The patient received compazine, IV benadryl, and a total of 6mg IV dilaudid.    Overview/Hospital Course:  Per Ortho review of imaging, small L medial malleolus avulsion, but uncertain of chronicity given no prior imaging. No  intervention indicated for this type of injury. WBAT and ambulation with CAM boot if it facilitates ambulation with less pain. PT/OT recommend no therapy. Review of femoral metaphysis without concern for acute injury. Sickle cell pain crisis precipitated by mechanical fall treated with multimodal and opioid pain regimen in addition to home opioid regimen.     Interval History: reporting pain in RLE overnight. Maintaining pain regimen. Reports she would like to try and DC on Wed    Review of Systems   Constitutional:  Negative for activity change, appetite change, diaphoresis and fever.   Respiratory:  Negative for cough, shortness of breath and wheezing.    Cardiovascular:  Negative for chest pain and leg swelling.   Gastrointestinal:  Negative for abdominal pain, constipation, diarrhea, nausea and vomiting.   Musculoskeletal:  Positive for arthralgias, back pain and myalgias.   Skin:  Negative for rash.   Neurological:  Negative for headaches.     Objective:     Vital Signs (Most Recent):  Temp: 98.6 °F (37 °C) (09/17/24 1207)  Pulse: 82 (09/17/24 1207)  Resp: 16 (09/17/24 1542)  BP: 132/87 (09/17/24 1207)  SpO2: (!) 93 % (09/17/24 1207) Vital Signs (24h Range):  Temp:  [98.6 °F (37 °C)-99.4 °F (37.4 °C)] 98.6 °F (37 °C)  Pulse:  [82-92] 82  Resp:  [14-18] 16  SpO2:  [93 %-97 %] 93 %  BP: (102-132)/(60-87) 132/87     Weight: 109.3 kg (241 lb)  Body mass index is 44.08 kg/m².    Intake/Output Summary (Last 24 hours) at 9/17/2024 1543  Last data filed at 9/17/2024 0557  Gross per 24 hour   Intake --   Output 1640 ml   Net -1640 ml         Physical Exam  Vitals and nursing note reviewed.   HENT:      Head: Normocephalic and atraumatic.   Cardiovascular:      Rate and Rhythm: Normal rate and regular rhythm.      Pulses: Normal pulses.   Pulmonary:      Effort: Pulmonary effort is normal. No respiratory distress.      Breath sounds: No wheezing or rales.   Abdominal:      Palpations: Abdomen is soft.      Tenderness:  There is no abdominal tenderness. There is no guarding.   Skin:     General: Skin is warm and dry.      Findings: Bruising present.      Comments: Contusion to R lateral thigh, L medial ankle   Neurological:      General: No focal deficit present.      Mental Status: She is alert and oriented to person, place, and time.   Psychiatric:         Mood and Affect: Mood normal.             Significant Labs: All pertinent labs within the past 24 hours have been reviewed.    Significant Imaging: I have reviewed all pertinent imaging results/findings within the past 24 hours.    Assessment/Plan:      * Fall  Pt present with trauma to her right upper/lower leg and her left lower leg/ankle after a fall. She reports she was trying to prevent her mother with dementia from getting out of bed, but her mother pulled away causing her to fall forward. She also notes striking her head. Denies LOC, headache, or vision changes. She was having severe pain at these areas that is not improved with MS contin and Percocet which she takes for sickle cell disease. No obvious deformity on exam.    -Xray bilateral tib/fib with no convincing acute displaced fracture or dislocation of the left or right tibia/fibula. Edema overlies the proximal aspect of the right tibia.  -Xray R femur with subtle cortical irregularity at the level of the proximal femoral metaphysis, not confirmed on the orthogonal views.   -Xray L ankle with moderate ankle soft tissue edema, asymmetric along the medial ankle. Age-indeterminate punctate calcification adjacent to the medial malleolus, potentially a chronic finding or related to small avulsion fracture. Otherwise, no acute displaced fracture. Per discussion with Ortho, nothing to do.  -MM pain control with gabapentin, tylenol, tizanidine, and MS contin. Will attempt oral oxy IR with IV dilaudid for breakthrough pain.  -Fall precautions.  -ok to WBAT, ambulate independently or with CAM boot (ordered, delivered). PT/OT  with recs for no therapy.    Constipation  -Chronic issue.  -Continue lactulose and senna-doc.    Anemia  Anemia is likely due to chronic disease due to sickle cell-beta thalassemia . Most recent hemoglobin and hematocrit are listed below.  Recent Labs     09/15/24  0400 09/16/24  0400   HGB 8.6* 8.0*   HCT 25.5* 25.6*       Plan  - Monitor serial CBC: Daily  - Transfuse PRBC if patient becomes hemodynamically unstable, symptomatic or H/H drops below 7/21.  - Patient has not received any PRBC transfusions to date  - Patient's anemia is currently stable    Thrombosis of internal carotid, left  See PE      Sickle cell pain crisis  Anemia is likely due to chronic disease due to sickle cell . Most recent hemoglobin and hematocrit are listed below.  Recent Labs     09/15/24  0400 09/16/24 0400   HGB 8.6* 8.0*   HCT 25.5* 25.6*       Plan  - Monitor serial CBC: Daily  - Transfuse PRBC if patient becomes hemodynamically unstable, symptomatic or H/H drops below 7/21.  - Patient has not received any PRBC transfusions to date  - Patient's anemia is currently stable  - See fall    Hypokalemia  Patient's most recent potassium results are listed below.   Recent Labs     09/15/24  0400 09/16/24 0400   K 3.6 3.5       Plan  - Replete potassium per protocol  - Monitor potassium Daily  - Patient's hypokalemia is stable    Morbid obesity  Body mass index is 44.08 kg/m². Morbid obesity complicates all aspects of disease management from diagnostic modalities to treatment.     History of pulmonary embolism  Thrombosis of internal carotid, left   - continue full dose Enoxaparin    Intermittent asthma without complication  -No s/s of acute exacerbation.  -PRN duo nebs.    Anxiety  -Chronic, stable.  -Continue fluoxetine.    Trigeminal neuralgia  -History noted.   -Continue gabapentin.    Hypertension  Chronic, controlled. Latest blood pressure and vitals reviewed-     Temp:  [98.6 °F (37 °C)-99.4 °F (37.4 °C)]   Pulse:  [82-92]   Resp:   [14-18]   BP: (102-132)/(60-87)   SpO2:  [93 %-97 %] .   Home meds for hypertension were reviewed and noted below.   Hypertension Medications               amLODIPine (NORVASC) 10 MG tablet Take 1 tablet (10 mg total) by mouth once daily.    carvediloL (COREG) 25 MG tablet Take 1 tablet (25 mg total) by mouth 2 (two) times daily.            While in the hospital, will manage blood pressure as follows; Continue home antihypertensive regimen    Will utilize p.r.n. blood pressure medication only if patient's blood pressure greater than 180/110 and she develops symptoms such as worsening chest pain or shortness of breath.    Sickle-cell disease with pain  PMHx of Hypertension, Sickle cell-beta thalassemia disease with pain, and Trigeminal neuralgia who presents with L ankle/lower leg pain and swelling after a ground level fall that occurred yesterday morning. Patient is concerned she is having a sickle cell crisis, although pain appears related to her fall. Pt received  25mg IV benadryl and a total of 6mg IV dilaudid in the ED. Pt also notes fever and mild SOB at home.   - Afebrile, no leukocytosis.  - CXR negative for acute process.  - Flu/RSV/COVID negative  - Labs with Hb 9.7 (at baseline), Retic 2.3, TB 0.7.  - Start multimodial pain regimen - tizanidine 4mg tid prn, tylenol 1,000mg tid, oxy 30 q4h PRN, dilaudid 4mg IV q4h PRN pain unrelieved by PO oxy, continue home gabapentin  - Continue folic acid   - Start D5 .45 NS at 75 ml/hr  - Monitor with daily CBC  - Senna-doc BID, pt intermittently requests Lactulose  - Benadryl and Zofran PRN  - Wean IV pain meds as tolerated to oral as pain diminishes      VTE Risk Mitigation (From admission, onward)           Ordered     enoxaparin injection 80 mg  Every 12 hours         09/15/24 1524     IP VTE HIGH RISK PATIENT  Once         09/13/24 2153     Place sequential compression device  Until discontinued         09/13/24 2153                    Discharge Planning   ALYSE:  9/19/2024     Code Status: Full Code   Is the patient medically ready for discharge?:     Reason for patient still in hospital (select all that apply): Treatment  Discharge Plan A: Home with family   Discharge Delays: None known at this time              Shlomo Albert MD  Department of Hospital Medicine   [facility]

## 2024-09-17 NOTE — ASSESSMENT & PLAN NOTE
Patient's most recent potassium results are listed below.   Recent Labs     09/15/24  0400 09/16/24  0400   K 3.6 3.5       Plan  - Replete potassium per protocol  - Monitor potassium Daily  - Patient's hypokalemia is stable

## 2024-09-17 NOTE — ASSESSMENT & PLAN NOTE
Anemia is likely due to chronic disease due to sickle cell . Most recent hemoglobin and hematocrit are listed below.  Recent Labs     09/15/24  0400 09/16/24  0400   HGB 8.6* 8.0*   HCT 25.5* 25.6*       Plan  - Monitor serial CBC: Daily  - Transfuse PRBC if patient becomes hemodynamically unstable, symptomatic or H/H drops below 7/21.  - Patient has not received any PRBC transfusions to date  - Patient's anemia is currently stable  - See fall

## 2024-09-17 NOTE — ASSESSMENT & PLAN NOTE
PMHx of Hypertension, Sickle cell-beta thalassemia disease with pain, and Trigeminal neuralgia who presents with L ankle/lower leg pain and swelling after a ground level fall that occurred yesterday morning. Patient is concerned she is having a sickle cell crisis, although pain appears related to her fall. Pt received  25mg IV benadryl and a total of 6mg IV dilaudid in the ED. Pt also notes fever and mild SOB at home.   - Afebrile, no leukocytosis.  - CXR negative for acute process.  - Flu/RSV/COVID negative  - Labs with Hb 9.7 (at baseline), Retic 2.3, TB 0.7.  - Start multimodial pain regimen - tizanidine 4mg tid prn, tylenol 1,000mg tid, oxy 30 q4h PRN, dilaudid 4mg IV q4h PRN pain unrelieved by PO oxy, continue home gabapentin  - Continue folic acid   - Start D5 .45 NS at 75 ml/hr  - Monitor with daily CBC  - Senna-doc BID, pt intermittently requests Lactulose  - Benadryl and Zofran PRN  - Wean IV pain meds as tolerated to oral as pain diminishes   Repair Performed By Another Provider Text (Leave Blank If You Do Not Want): After obtaining clear surgical margins the defect was repaired by another provider.

## 2024-09-18 LAB
ALBUMIN SERPL BCP-MCNC: 3 G/DL (ref 3.5–5.2)
ALP SERPL-CCNC: 70 U/L (ref 55–135)
ALT SERPL W/O P-5'-P-CCNC: 10 U/L (ref 10–44)
ANION GAP SERPL CALC-SCNC: 5 MMOL/L (ref 8–16)
AST SERPL-CCNC: 16 U/L (ref 10–40)
BASOPHILS # BLD AUTO: 0.02 K/UL (ref 0–0.2)
BASOPHILS NFR BLD: 0.2 % (ref 0–1.9)
BILIRUB SERPL-MCNC: 0.4 MG/DL (ref 0.1–1)
BUN SERPL-MCNC: 8 MG/DL (ref 6–20)
CALCIUM SERPL-MCNC: 9.1 MG/DL (ref 8.7–10.5)
CHLORIDE SERPL-SCNC: 108 MMOL/L (ref 95–110)
CO2 SERPL-SCNC: 27 MMOL/L (ref 23–29)
CREAT SERPL-MCNC: 1 MG/DL (ref 0.5–1.4)
DIFFERENTIAL METHOD BLD: ABNORMAL
EOSINOPHIL # BLD AUTO: 0.2 K/UL (ref 0–0.5)
EOSINOPHIL NFR BLD: 2.5 % (ref 0–8)
ERYTHROCYTE [DISTWIDTH] IN BLOOD BY AUTOMATED COUNT: 20 % (ref 11.5–14.5)
EST. GFR  (NO RACE VARIABLE): >60 ML/MIN/1.73 M^2
FACT X PPP CHRO-ACNC: 1.2 IU/ML (ref 0.3–0.7)
GLUCOSE SERPL-MCNC: 131 MG/DL (ref 70–110)
HCT VFR BLD AUTO: 22.8 % (ref 37–48.5)
HGB BLD-MCNC: 7.6 G/DL (ref 12–16)
IMM GRANULOCYTES # BLD AUTO: 0.05 K/UL (ref 0–0.04)
IMM GRANULOCYTES NFR BLD AUTO: 0.5 % (ref 0–0.5)
LYMPHOCYTES # BLD AUTO: 2.8 K/UL (ref 1–4.8)
LYMPHOCYTES NFR BLD: 30 % (ref 18–48)
MCH RBC QN AUTO: 25.4 PG (ref 27–31)
MCHC RBC AUTO-ENTMCNC: 33.3 G/DL (ref 32–36)
MCV RBC AUTO: 76 FL (ref 82–98)
MONOCYTES # BLD AUTO: 1.3 K/UL (ref 0.3–1)
MONOCYTES NFR BLD: 14 % (ref 4–15)
NEUTROPHILS # BLD AUTO: 4.9 K/UL (ref 1.8–7.7)
NEUTROPHILS NFR BLD: 52.8 % (ref 38–73)
NRBC BLD-RTO: 2 /100 WBC
PLATELET # BLD AUTO: 590 K/UL (ref 150–450)
PMV BLD AUTO: 10 FL (ref 9.2–12.9)
POTASSIUM SERPL-SCNC: 3.5 MMOL/L (ref 3.5–5.1)
PROT SERPL-MCNC: 6.5 G/DL (ref 6–8.4)
RBC # BLD AUTO: 2.99 M/UL (ref 4–5.4)
SODIUM SERPL-SCNC: 140 MMOL/L (ref 136–145)
WBC # BLD AUTO: 9.22 K/UL (ref 3.9–12.7)

## 2024-09-18 PROCEDURE — 85025 COMPLETE CBC W/AUTO DIFF WBC: CPT

## 2024-09-18 PROCEDURE — 85520 HEPARIN ASSAY: CPT

## 2024-09-18 PROCEDURE — 25000003 PHARM REV CODE 250

## 2024-09-18 PROCEDURE — 21400001 HC TELEMETRY ROOM

## 2024-09-18 PROCEDURE — 25000003 PHARM REV CODE 250: Performed by: NURSE PRACTITIONER

## 2024-09-18 PROCEDURE — 63600175 PHARM REV CODE 636 W HCPCS

## 2024-09-18 PROCEDURE — 80053 COMPREHEN METABOLIC PANEL: CPT

## 2024-09-18 RX ORDER — ENOXAPARIN SODIUM 100 MG/ML
70 INJECTION SUBCUTANEOUS EVERY 12 HOURS
Status: DISCONTINUED | OUTPATIENT
Start: 2024-09-18 | End: 2024-09-19 | Stop reason: HOSPADM

## 2024-09-18 RX ORDER — OXYCODONE HYDROCHLORIDE 10 MG/1
40 TABLET ORAL EVERY 4 HOURS PRN
Status: DISCONTINUED | OUTPATIENT
Start: 2024-09-18 | End: 2024-09-19 | Stop reason: HOSPADM

## 2024-09-18 RX ORDER — HYDROMORPHONE HYDROCHLORIDE 1 MG/ML
3 INJECTION, SOLUTION INTRAMUSCULAR; INTRAVENOUS; SUBCUTANEOUS EVERY 4 HOURS PRN
Status: DISCONTINUED | OUTPATIENT
Start: 2024-09-18 | End: 2024-09-19

## 2024-09-18 RX ADMIN — OXYCODONE HYDROCHLORIDE 40 MG: 10 TABLET ORAL at 12:09

## 2024-09-18 RX ADMIN — GABAPENTIN 400 MG: 400 CAPSULE ORAL at 09:09

## 2024-09-18 RX ADMIN — FLUOXETINE HYDROCHLORIDE 40 MG: 20 CAPSULE ORAL at 07:09

## 2024-09-18 RX ADMIN — ACETAMINOPHEN 1000 MG: 500 TABLET ORAL at 09:09

## 2024-09-18 RX ADMIN — HYDROMORPHONE HYDROCHLORIDE 3 MG: 1 INJECTION, SOLUTION INTRAMUSCULAR; INTRAVENOUS; SUBCUTANEOUS at 06:09

## 2024-09-18 RX ADMIN — LACTULOSE 20 G: 20 SOLUTION ORAL at 09:09

## 2024-09-18 RX ADMIN — OXYCODONE HYDROCHLORIDE 40 MG: 10 TABLET ORAL at 09:09

## 2024-09-18 RX ADMIN — ENOXAPARIN SODIUM 80 MG: 100 INJECTION SUBCUTANEOUS at 07:09

## 2024-09-18 RX ADMIN — OXYCODONE HYDROCHLORIDE 30 MG: 10 TABLET ORAL at 02:09

## 2024-09-18 RX ADMIN — HYDROMORPHONE HYDROCHLORIDE 3 MG: 1 INJECTION, SOLUTION INTRAMUSCULAR; INTRAVENOUS; SUBCUTANEOUS at 02:09

## 2024-09-18 RX ADMIN — HYDROMORPHONE HYDROCHLORIDE 4 MG: 1 INJECTION, SOLUTION INTRAMUSCULAR; INTRAVENOUS; SUBCUTANEOUS at 09:09

## 2024-09-18 RX ADMIN — CARVEDILOL 25 MG: 25 TABLET, FILM COATED ORAL at 09:09

## 2024-09-18 RX ADMIN — AMLODIPINE BESYLATE 10 MG: 10 TABLET ORAL at 07:09

## 2024-09-18 RX ADMIN — DIPHENHYDRAMINE HYDROCHLORIDE 50 MG: 50 INJECTION, SOLUTION INTRAMUSCULAR; INTRAVENOUS at 09:09

## 2024-09-18 RX ADMIN — CARVEDILOL 25 MG: 25 TABLET, FILM COATED ORAL at 07:09

## 2024-09-18 RX ADMIN — DIPHENHYDRAMINE HYDROCHLORIDE 50 MG: 50 INJECTION, SOLUTION INTRAMUSCULAR; INTRAVENOUS at 02:09

## 2024-09-18 RX ADMIN — OXYCODONE HYDROCHLORIDE 30 MG: 10 TABLET ORAL at 07:09

## 2024-09-18 RX ADMIN — HYDROMORPHONE HYDROCHLORIDE 3 MG: 1 INJECTION, SOLUTION INTRAMUSCULAR; INTRAVENOUS; SUBCUTANEOUS at 10:09

## 2024-09-18 RX ADMIN — FLUTICASONE PROPIONATE 50 MCG: 50 SPRAY, METERED NASAL at 07:09

## 2024-09-18 RX ADMIN — MORPHINE SULFATE 30 MG: 30 TABLET, FILM COATED, EXTENDED RELEASE ORAL at 09:09

## 2024-09-18 RX ADMIN — MORPHINE SULFATE 30 MG: 30 TABLET, FILM COATED, EXTENDED RELEASE ORAL at 07:09

## 2024-09-18 RX ADMIN — ACETAMINOPHEN 1000 MG: 500 TABLET ORAL at 03:09

## 2024-09-18 RX ADMIN — GABAPENTIN 400 MG: 400 CAPSULE ORAL at 07:09

## 2024-09-18 RX ADMIN — FOLIC ACID 1 MG: 1 TABLET ORAL at 07:09

## 2024-09-18 RX ADMIN — SENNOSIDES AND DOCUSATE SODIUM 1 TABLET: 50; 8.6 TABLET ORAL at 09:09

## 2024-09-18 RX ADMIN — HYDROMORPHONE HYDROCHLORIDE 4 MG: 1 INJECTION, SOLUTION INTRAMUSCULAR; INTRAVENOUS; SUBCUTANEOUS at 05:09

## 2024-09-18 RX ADMIN — ACETAMINOPHEN 1000 MG: 500 TABLET ORAL at 05:09

## 2024-09-18 RX ADMIN — OXYCODONE HYDROCHLORIDE 40 MG: 10 TABLET ORAL at 04:09

## 2024-09-18 NOTE — PROGRESS NOTES
[facility] Medicine  Progress Note    Patient Name: Nazanin Malone  MRN: [id number]  Patient Class: IP- Inpatient   Admission Date: 9/13/2024  Length of Stay: 4 days  Attending Physician: Shlomo Albert MD  Primary Care Provider: Pee Montgomery MD        Subjective:     Principal Problem:Fall        HPI:  Nazanin Malone is a 44 y.o. 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 She reports she was trying to prevent her mother with dementia from getting out of bed, but her mother pulled away causing her to fall forward. This resulted in pain to her right upper/lower leg and her left lower leg/ankle. She also notes striking her head. Denies LOC, headache, or vision changes. She was having severe pain to her legs that is not improved with MS contin and Percocet which she takes for sickle cell disease. The patient reports fever and mild SOB since yesterday but denies cough, chest pain, dizziness, N/V/D, or dysuria.    In the ED, VSS, afebrile. Hgb 9.7 (at baseline). Retic count 2.3. K+3.3. BNP 57. Troponin <0.006. EKG shows SR w/ non specific T wave abnormality, 87 bpm, prolonged QTc 478. UA pending. CXR with interstitial findings accentuated by habitus, no large focal consolidation. Xray bilateral tib/fib with no convincing acute displaced fracture or dislocation of the left or right tibia/fibula. Edema overlies the proximal aspect of the right tibia. Xray R femur with subtle cortical irregularity at the level of the proximal femoral metaphysis, not confirmed on the orthogonal views. The patient received compazine, IV benadryl, and a total of 6mg IV dilaudid.    Overview/Hospital Course:  Per Ortho review of imaging, small L medial malleolus avulsion, but uncertain of chronicity given no prior imaging. No  intervention indicated for this type of injury. WBAT and ambulation with CAM boot if it facilitates ambulation with less pain. PT/OT recommend no therapy. Review of femoral metaphysis without concern for acute injury. Sickle cell pain crisis precipitated by mechanical fall treated with multimodal and opioid pain regimen in addition to home opioid regimen.     Interval History: NAEON, continued leg pain overnight. Weaning dilaudid today.    Review of Systems   Constitutional:  Negative for activity change, appetite change, diaphoresis and fever.   Respiratory:  Negative for cough, shortness of breath and wheezing.    Cardiovascular:  Negative for chest pain and leg swelling.   Gastrointestinal:  Negative for abdominal pain, constipation, diarrhea, nausea and vomiting.   Musculoskeletal:  Positive for arthralgias, back pain and myalgias.   Skin:  Negative for rash.   Neurological:  Negative for headaches.     Objective:     Vital Signs (Most Recent):  Temp: 97 °F (36.1 °C) (09/18/24 1126)  Pulse: 86 (09/18/24 1126)  Resp: 18 (09/18/24 1248)  BP: 109/68 (09/18/24 1126)  SpO2: 97 % (09/18/24 1126) Vital Signs (24h Range):  Temp:  [97 °F (36.1 °C)-100 °F (37.8 °C)] 97 °F (36.1 °C)  Pulse:  [84-92] 86  Resp:  [14-19] 18  SpO2:  [95 %-97 %] 97 %  BP: (109-133)/(67-84) 109/68     Weight: 109.3 kg (241 lb)  Body mass index is 44.08 kg/m².  No intake or output data in the 24 hours ending 09/18/24 1401      Physical Exam  Vitals and nursing note reviewed.   HENT:      Head: Normocephalic and atraumatic.   Cardiovascular:      Rate and Rhythm: Normal rate and regular rhythm.      Pulses: Normal pulses.   Pulmonary:      Effort: Pulmonary effort is normal. No respiratory distress.      Breath sounds: No wheezing or rales.   Abdominal:      Palpations: Abdomen is soft.      Tenderness: There is no abdominal tenderness. There is no guarding.   Skin:     General: Skin is warm and dry.      Findings: Bruising present.      Comments:  Contusion to R lateral thigh, L medial ankle   Neurological:      General: No focal deficit present.      Mental Status: She is alert and oriented to person, place, and time.   Psychiatric:         Mood and Affect: Mood normal.             Significant Labs: All pertinent labs within the past 24 hours have been reviewed.    Significant Imaging: I have reviewed all pertinent imaging results/findings within the past 24 hours.    Assessment/Plan:      * Fall  Pt present with trauma to her right upper/lower leg and her left lower leg/ankle after a fall. She reports she was trying to prevent her mother with dementia from getting out of bed, but her mother pulled away causing her to fall forward. She also notes striking her head. Denies LOC, headache, or vision changes. She was having severe pain at these areas that is not improved with MS contin and Percocet which she takes for sickle cell disease. No obvious deformity on exam.    -Xray bilateral tib/fib with no convincing acute displaced fracture or dislocation of the left or right tibia/fibula. Edema overlies the proximal aspect of the right tibia.  -Xray R femur with subtle cortical irregularity at the level of the proximal femoral metaphysis, not confirmed on the orthogonal views.   -Xray L ankle with moderate ankle soft tissue edema, asymmetric along the medial ankle. Age-indeterminate punctate calcification adjacent to the medial malleolus, potentially a chronic finding or related to small avulsion fracture. Otherwise, no acute displaced fracture. Per discussion with Ortho, nothing to do.  -MM pain control with gabapentin, tylenol, tizanidine, and MS contin. Will attempt oral oxy IR with IV dilaudid for breakthrough pain.  -Fall precautions.  -ok to WBAT, ambulate independently or with CAM boot (ordered, delivered). PT/OT with recs for no therapy.    Constipation  -Chronic issue.  -Continue lactulose and senna-doc.    Anemia  Anemia is likely due to chronic disease  due to sickle cell-beta thalassemia . Most recent hemoglobin and hematocrit are listed below.  Recent Labs     09/16/24  0400 09/18/24 0902   HGB 8.0* 7.6*   HCT 25.6* 22.8*       Plan  - Monitor serial CBC: Daily  - Transfuse PRBC if patient becomes hemodynamically unstable, symptomatic or H/H drops below 7/21.  - Patient has not received any PRBC transfusions to date  - Patient's anemia is currently stable    Thrombosis of internal carotid, left  See PE      Sickle cell pain crisis  Anemia is likely due to chronic disease due to sickle cell . Most recent hemoglobin and hematocrit are listed below.  Recent Labs     09/16/24  0400 09/18/24 0902   HGB 8.0* 7.6*   HCT 25.6* 22.8*       Plan  - Monitor serial CBC: Daily  - Transfuse PRBC if patient becomes hemodynamically unstable, symptomatic or H/H drops below 7/21.  - Patient has not received any PRBC transfusions to date  - Patient's anemia is currently stable  - See fall    Hypokalemia  Patient's most recent potassium results are listed below.   Recent Labs     09/16/24  0400 09/18/24 0902   K 3.5 3.5       Plan  - Replete potassium per protocol  - Monitor potassium Daily  - Patient's hypokalemia is stable    Morbid obesity  Body mass index is 44.08 kg/m². Morbid obesity complicates all aspects of disease management from diagnostic modalities to treatment.     History of pulmonary embolism  Thrombosis of internal carotid, left   - continue full dose Enoxaparin    Intermittent asthma without complication  -No s/s of acute exacerbation.  -PRN duo nebs.    Anxiety  -Chronic, stable.  -Continue fluoxetine.    Trigeminal neuralgia  -History noted.   -Continue gabapentin.    Hypertension  Chronic, controlled. Latest blood pressure and vitals reviewed-     Temp:  [97 °F (36.1 °C)-100 °F (37.8 °C)]   Pulse:  [84-92]   Resp:  [14-19]   BP: (109-133)/(67-84)   SpO2:  [95 %-97 %] .   Home meds for hypertension were reviewed and noted below.   Hypertension Medications                amLODIPine (NORVASC) 10 MG tablet Take 1 tablet (10 mg total) by mouth once daily.    carvediloL (COREG) 25 MG tablet Take 1 tablet (25 mg total) by mouth 2 (two) times daily.            While in the hospital, will manage blood pressure as follows; Continue home antihypertensive regimen    Will utilize p.r.n. blood pressure medication only if patient's blood pressure greater than 180/110 and she develops symptoms such as worsening chest pain or shortness of breath.    Sickle-cell disease with pain  PMHx of Hypertension, Sickle cell-beta thalassemia disease with pain, and Trigeminal neuralgia who presents with L ankle/lower leg pain and swelling after a ground level fall that occurred yesterday morning. Patient is concerned she is having a sickle cell crisis, although pain appears related to her fall. Pt received  25mg IV benadryl and a total of 6mg IV dilaudid in the ED. Pt also notes fever and mild SOB at home.   - Afebrile, no leukocytosis.  - CXR negative for acute process.  - Flu/RSV/COVID negative  - Labs with Hb 9.7 (at baseline), Retic 2.3, TB 0.7.  - Start multimodial pain regimen - tizanidine 4mg tid prn, tylenol 1,000mg tid, oxy 40 q4h PRN, dilaudid 3mg IV q4h PRN pain unrelieved by PO oxy, continue home gabapentin  - Continue folic acid   - Start D5 .45 NS at 75 ml/hr  - Monitor with daily CBC  - Senna-doc BID, pt intermittently requests Lactulose  - Benadryl and Zofran PRN  - Wean IV pain meds as tolerated to oral as pain diminishes      VTE Risk Mitigation (From admission, onward)           Ordered     enoxaparin injection 80 mg  Every 12 hours         09/15/24 1524     IP VTE HIGH RISK PATIENT  Once         09/13/24 2153     Place sequential compression device  Until discontinued         09/13/24 2153                    Discharge Planning   ALYSE: 9/19/2024     Code Status: Full Code   Is the patient medically ready for discharge?:     Reason for patient still in hospital (select all that  apply): Treatment  Discharge Plan A: Home with family   Discharge Delays: None known at this time              Shlomo Albert MD  Department of Hospital Medicine   [facility]

## 2024-09-18 NOTE — ASSESSMENT & PLAN NOTE
Patient's most recent potassium results are listed below.   Recent Labs     09/16/24  0400 09/18/24  0902   K 3.5 3.5       Plan  - Replete potassium per protocol  - Monitor potassium Daily  - Patient's hypokalemia is stable

## 2024-09-18 NOTE — ASSESSMENT & PLAN NOTE
PMHx of Hypertension, Sickle cell-beta thalassemia disease with pain, and Trigeminal neuralgia who presents with L ankle/lower leg pain and swelling after a ground level fall that occurred yesterday morning. Patient is concerned she is having a sickle cell crisis, although pain appears related to her fall. Pt received  25mg IV benadryl and a total of 6mg IV dilaudid in the ED. Pt also notes fever and mild SOB at home.   - Afebrile, no leukocytosis.  - CXR negative for acute process.  - Flu/RSV/COVID negative  - Labs with Hb 9.7 (at baseline), Retic 2.3, TB 0.7.  - Start multimodial pain regimen - tizanidine 4mg tid prn, tylenol 1,000mg tid, oxy 40 q4h PRN, dilaudid 3mg IV q4h PRN pain unrelieved by PO oxy, continue home gabapentin  - Continue folic acid   - Start D5 .45 NS at 75 ml/hr  - Monitor with daily CBC  - Senna-doc BID, pt intermittently requests Lactulose  - Benadryl and Zofran PRN  - Wean IV pain meds as tolerated to oral as pain diminishes

## 2024-09-18 NOTE — ASSESSMENT & PLAN NOTE
Anemia is likely due to chronic disease due to sickle cell . Most recent hemoglobin and hematocrit are listed below.  Recent Labs     09/16/24  0400 09/18/24  0902   HGB 8.0* 7.6*   HCT 25.6* 22.8*       Plan  - Monitor serial CBC: Daily  - Transfuse PRBC if patient becomes hemodynamically unstable, symptomatic or H/H drops below 7/21.  - Patient has not received any PRBC transfusions to date  - Patient's anemia is currently stable  - See fall

## 2024-09-18 NOTE — ASSESSMENT & PLAN NOTE
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 Most recent hemoglobin and hematocrit are listed below.  Recent Labs     09/16/24  0400 09/18/24  0902   HGB 8.0* 7.6*   HCT 25.6* 22.8*       Plan  - Monitor serial CBC: Daily  - Transfuse PRBC if patient becomes hemodynamically unstable, symptomatic or H/H drops below 7/21.  - Patient has not received any PRBC transfusions to date  - Patient's anemia is currently stable

## 2024-09-18 NOTE — ASSESSMENT & PLAN NOTE
Chronic, controlled. Latest blood pressure and vitals reviewed-     Temp:  [97 °F (36.1 °C)-100 °F (37.8 °C)]   Pulse:  [84-92]   Resp:  [14-19]   BP: (109-133)/(67-84)   SpO2:  [95 %-97 %] .   Home meds for hypertension were reviewed and noted below.   Hypertension Medications               amLODIPine (NORVASC) 10 MG tablet Take 1 tablet (10 mg total) by mouth once daily.    carvediloL (COREG) 25 MG tablet Take 1 tablet (25 mg total) by mouth 2 (two) times daily.            While in the hospital, will manage blood pressure as follows; Continue home antihypertensive regimen    Will utilize p.r.n. blood pressure medication only if patient's blood pressure greater than 180/110 and she develops symptoms such as worsening chest pain or shortness of breath.

## 2024-09-18 NOTE — SUBJECTIVE & OBJECTIVE
Interval History: NAEON, continued leg pain overnight. Weaning dilaudid today.    Review of Systems   Constitutional:  Negative for activity change, appetite change, diaphoresis and fever.   Respiratory:  Negative for cough, shortness of breath and wheezing.    Cardiovascular:  Negative for chest pain and leg swelling.   Gastrointestinal:  Negative for abdominal pain, constipation, diarrhea, nausea and vomiting.   Musculoskeletal:  Positive for arthralgias, back pain and myalgias.   Skin:  Negative for rash.   Neurological:  Negative for headaches.     Objective:     Vital Signs (Most Recent):  Temp: 97 °F (36.1 °C) (09/18/24 1126)  Pulse: 86 (09/18/24 1126)  Resp: 18 (09/18/24 1248)  BP: 109/68 (09/18/24 1126)  SpO2: 97 % (09/18/24 1126) Vital Signs (24h Range):  Temp:  [97 °F (36.1 °C)-100 °F (37.8 °C)] 97 °F (36.1 °C)  Pulse:  [84-92] 86  Resp:  [14-19] 18  SpO2:  [95 %-97 %] 97 %  BP: (109-133)/(67-84) 109/68     Weight: 109.3 kg (241 lb)  Body mass index is 44.08 kg/m².  No intake or output data in the 24 hours ending 09/18/24 1401      Physical Exam  Vitals and nursing note reviewed.   HENT:      Head: Normocephalic and atraumatic.   Cardiovascular:      Rate and Rhythm: Normal rate and regular rhythm.      Pulses: Normal pulses.   Pulmonary:      Effort: Pulmonary effort is normal. No respiratory distress.      Breath sounds: No wheezing or rales.   Abdominal:      Palpations: Abdomen is soft.      Tenderness: There is no abdominal tenderness. There is no guarding.   Skin:     General: Skin is warm and dry.      Findings: Bruising present.      Comments: Contusion to R lateral thigh, L medial ankle   Neurological:      General: No focal deficit present.      Mental Status: She is alert and oriented to person, place, and time.   Psychiatric:         Mood and Affect: Mood normal.             Significant Labs: All pertinent labs within the past 24 hours have been reviewed.    Significant Imaging: I have reviewed  all pertinent imaging results/findings within the past 24 hours.

## 2024-09-19 VITALS
HEART RATE: 91 BPM | BODY MASS INDEX: 44.35 KG/M2 | DIASTOLIC BLOOD PRESSURE: 78 MMHG | WEIGHT: 241 LBS | HEIGHT: 62 IN | RESPIRATION RATE: 18 BRPM | SYSTOLIC BLOOD PRESSURE: 140 MMHG | TEMPERATURE: 100 F | OXYGEN SATURATION: 94 %

## 2024-09-19 PROCEDURE — 63600175 PHARM REV CODE 636 W HCPCS

## 2024-09-19 PROCEDURE — 25000003 PHARM REV CODE 250

## 2024-09-19 PROCEDURE — 25000003 PHARM REV CODE 250: Performed by: NURSE PRACTITIONER

## 2024-09-19 RX ORDER — GABAPENTIN 400 MG/1
400 CAPSULE ORAL 2 TIMES DAILY
Qty: 90 CAPSULE | Refills: 1 | Status: ON HOLD | OUTPATIENT
Start: 2024-09-19 | End: 2024-12-18

## 2024-09-19 RX ORDER — TIZANIDINE 4 MG/1
4 TABLET ORAL EVERY 8 HOURS PRN
Qty: 90 TABLET | Refills: 0 | Status: ON HOLD | OUTPATIENT
Start: 2024-09-19 | End: 2024-10-19

## 2024-09-19 RX ORDER — HEPARIN SODIUM,PORCINE/PF 10 UNIT/ML
10 SYRINGE (ML) INTRAVENOUS
Status: DISCONTINUED | OUTPATIENT
Start: 2024-09-19 | End: 2024-09-19 | Stop reason: HOSPADM

## 2024-09-19 RX ORDER — OXYCODONE HYDROCHLORIDE 20 MG/1
40 TABLET ORAL EVERY 4 HOURS PRN
Qty: 42 TABLET | Refills: 0 | Status: SHIPPED | OUTPATIENT
Start: 2024-09-19 | End: 2024-09-23

## 2024-09-19 RX ORDER — HYDROMORPHONE HYDROCHLORIDE 1 MG/ML
1 INJECTION, SOLUTION INTRAMUSCULAR; INTRAVENOUS; SUBCUTANEOUS EVERY 4 HOURS PRN
Status: DISCONTINUED | OUTPATIENT
Start: 2024-09-19 | End: 2024-09-19 | Stop reason: HOSPADM

## 2024-09-19 RX ORDER — ENOXAPARIN SODIUM 100 MG/ML
70 INJECTION SUBCUTANEOUS EVERY 12 HOURS
Qty: 48 ML | Refills: 0 | Status: ON HOLD | OUTPATIENT
Start: 2024-09-19 | End: 2024-10-19

## 2024-09-19 RX ORDER — OXYCODONE HYDROCHLORIDE 20 MG/1
40 TABLET ORAL EVERY 4 HOURS PRN
Qty: 42 TABLET | Refills: 0 | Status: SHIPPED | OUTPATIENT
Start: 2024-09-19 | End: 2024-09-19

## 2024-09-19 RX ORDER — ENOXAPARIN SODIUM 100 MG/ML
70 INJECTION SUBCUTANEOUS EVERY 12 HOURS
Qty: 0.8 ML | Refills: 60 | Status: SHIPPED | OUTPATIENT
Start: 2024-09-19 | End: 2024-09-19

## 2024-09-19 RX ADMIN — DIPHENHYDRAMINE HYDROCHLORIDE 50 MG: 50 INJECTION, SOLUTION INTRAMUSCULAR; INTRAVENOUS at 05:09

## 2024-09-19 RX ADMIN — HEPARIN, PORCINE (PF) 10 UNIT/ML INTRAVENOUS SYRINGE 10 UNITS: at 07:09

## 2024-09-19 RX ADMIN — CARVEDILOL 25 MG: 25 TABLET, FILM COATED ORAL at 09:09

## 2024-09-19 RX ADMIN — MORPHINE SULFATE 30 MG: 30 TABLET, FILM COATED, EXTENDED RELEASE ORAL at 09:09

## 2024-09-19 RX ADMIN — DIPHENHYDRAMINE HYDROCHLORIDE 50 MG: 50 INJECTION, SOLUTION INTRAMUSCULAR; INTRAVENOUS at 11:09

## 2024-09-19 RX ADMIN — OXYCODONE HYDROCHLORIDE 40 MG: 10 TABLET ORAL at 01:09

## 2024-09-19 RX ADMIN — AMLODIPINE BESYLATE 10 MG: 10 TABLET ORAL at 09:09

## 2024-09-19 RX ADMIN — ACETAMINOPHEN 1000 MG: 500 TABLET ORAL at 05:09

## 2024-09-19 RX ADMIN — HYDROMORPHONE HYDROCHLORIDE 3 MG: 1 INJECTION, SOLUTION INTRAMUSCULAR; INTRAVENOUS; SUBCUTANEOUS at 05:09

## 2024-09-19 RX ADMIN — OXYCODONE HYDROCHLORIDE 40 MG: 10 TABLET ORAL at 02:09

## 2024-09-19 RX ADMIN — OXYCODONE HYDROCHLORIDE 40 MG: 10 TABLET ORAL at 08:09

## 2024-09-19 RX ADMIN — ENOXAPARIN SODIUM 70 MG: 100 INJECTION SUBCUTANEOUS at 12:09

## 2024-09-19 RX ADMIN — FLUTICASONE PROPIONATE 50 MCG: 50 SPRAY, METERED NASAL at 08:09

## 2024-09-19 RX ADMIN — FOLIC ACID 1 MG: 1 TABLET ORAL at 09:09

## 2024-09-19 RX ADMIN — GABAPENTIN 400 MG: 400 CAPSULE ORAL at 09:09

## 2024-09-19 RX ADMIN — HYDROMORPHONE HYDROCHLORIDE 3 MG: 1 INJECTION, SOLUTION INTRAMUSCULAR; INTRAVENOUS; SUBCUTANEOUS at 11:09

## 2024-09-19 RX ADMIN — ENOXAPARIN SODIUM 70 MG: 100 INJECTION SUBCUTANEOUS at 09:09

## 2024-09-19 RX ADMIN — FLUOXETINE HYDROCHLORIDE 40 MG: 20 CAPSULE ORAL at 09:09

## 2024-09-19 RX ADMIN — SENNOSIDES AND DOCUSATE SODIUM 1 TABLET: 50; 8.6 TABLET ORAL at 09:09

## 2024-09-19 NOTE — DISCHARGE SUMMARY
[facility] Medicine  Discharge Summary      Patient Name: Nazanin Malone  MRN: [id number]  VLADMIIR: [id number]  Patient Class: IP- Inpatient  Admission Date: 9/13/2024  Hospital Length of Stay: 5 days  Discharge Date and Time:  09/19/2024 4:39 PM  Attending Physician: Shlomo Albert MD   Discharging Provider: Shlomo Albert MD  Primary Care Provider: Pee Montgomery MD  [facility] Medicine Team: [facility] Shlomo Albret MD  Primary Care Team: [facility]    HPI:   Nazanin Malone is a 44 y.o. 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 She reports she was trying to prevent her mother with dementia from getting out of bed, but her mother pulled away causing her to fall forward. This resulted in pain to her right upper/lower leg and her left lower leg/ankle. She also notes striking her head. Denies LOC, headache, or vision changes. She was having severe pain to her legs that is not improved with MS contin and Percocet which she takes for sickle cell disease. The patient reports fever and mild SOB since yesterday but denies cough, chest pain, dizziness, N/V/D, or dysuria.    In the ED, VSS, afebrile. Hgb 9.7 (at baseline). Retic count 2.3. K+3.3. BNP 57. Troponin <0.006. EKG shows SR w/ non specific T wave abnormality, 87 bpm, prolonged QTc 478. UA pending. CXR with interstitial findings accentuated by habitus, no large focal consolidation. Xray bilateral tib/fib with no convincing acute displaced fracture or dislocation of the left or right tibia/fibula. Edema overlies the proximal aspect of the right tibia. Xray R femur with subtle cortical irregularity at the level of the proximal femoral metaphysis, not confirmed on the orthogonal views. The patient received compazine, IV benadryl, and a total of 6mg IV  dilaudid.    * No surgery found *      Hospital Course:   Per Ortho review of imaging, small L medial malleolus avulsion, but uncertain of chronicity given no prior imaging. No intervention indicated for this type of injury. WBAT and ambulation with CAM boot if it facilitates ambulation with less pain. PT/OT recommend no therapy. Review of femoral metaphysis without concern for acute injury. Sickle cell pain crisis precipitated by mechanical fall treated with multimodal and opioid pain regimen in addition to home opioid regimen. Pt discharged after continued IV dilaudid wean to Oxy 40mg po regimen with sufficient medication until next follow up. Lovenox dosing decreased given recurrent supratherapeutic values during inpatient Xa monitoring. Encouraged continued ambulation at home with CAM boot as needed.      Goals of Care Treatment Preferences:  Code Status: Full Code      SDOH Screening:  The patient was screened for utility difficulties, food insecurity, transport difficulties, housing insecurity, and interpersonal safety and there were no concerns identified this admission.     Consults:     Interval History: Pt with continued pain, but willing to transition to entirely oral regimen to facilitate DC home to see her family and to continue recovery at home. States she has scheduled follow up in next week.    Review of Systems   Constitutional:  Negative for activity change, appetite change, diaphoresis and fever.   Respiratory:  Negative for cough, shortness of breath and wheezing.    Cardiovascular:  Negative for chest pain and leg swelling.   Gastrointestinal:  Negative for abdominal pain, constipation, diarrhea, nausea and vomiting.   Musculoskeletal:  Positive for arthralgias, back pain and myalgias.   Skin:  Negative for rash.   Neurological:  Negative for headaches.     Objective:     Vital Signs (Most Recent):  Temp: 99.5 °F (37.5 °C) (09/19/24 1459)  Pulse: 91 (09/19/24 1459)  Resp: 16 (09/19/24 1459)  BP:  (!) 140/78 (09/19/24 1459)  SpO2: (!) 94 % (09/19/24 1459) Vital Signs (24h Range):  Temp:  [98.3 °F (36.8 °C)-99.5 °F (37.5 °C)] 99.5 °F (37.5 °C)  Pulse:  [75-92] 91  Resp:  [14-20] 16  SpO2:  [93 %-98 %] 94 %  BP: (122-140)/(64-85) 140/78     Weight: 109.3 kg (241 lb)  Body mass index is 44.08 kg/m².    Intake/Output Summary (Last 24 hours) at 9/19/2024 1637  Last data filed at 9/19/2024 1117  Gross per 24 hour   Intake --   Output 900 ml   Net -900 ml         Physical Exam  Vitals and nursing note reviewed.   HENT:      Head: Normocephalic and atraumatic.   Cardiovascular:      Rate and Rhythm: Normal rate and regular rhythm.      Pulses: Normal pulses.   Pulmonary:      Effort: Pulmonary effort is normal. No respiratory distress.      Breath sounds: No wheezing or rales.   Abdominal:      Palpations: Abdomen is soft.      Tenderness: There is no abdominal tenderness. There is no guarding.   Skin:     General: Skin is warm and dry.      Findings: Bruising present.      Comments: Contusion to R lateral thigh, L medial ankle   Neurological:      General: No focal deficit present.      Mental Status: She is alert and oriented to person, place, and time.   Psychiatric:         Mood and Affect: Mood normal.             Significant Labs: All pertinent labs within the past 24 hours have been reviewed.    Significant Imaging: I have reviewed all pertinent imaging results/findings within the past 24 hours.    Final Active Diagnoses:    Diagnosis Date Noted POA    PRINCIPAL PROBLEM:  Fall [W19.XXXA] 09/14/2024 Yes    Constipation [K59.00] 08/22/2024 Yes    Anemia [D64.9] 08/22/2024 Yes    Thrombosis of internal carotid, left [I65.22] 08/03/2024 Yes    Sickle cell pain crisis [D57.00] 12/21/2020 Yes    Morbid obesity [E66.01] 06/08/2020 Yes    History of pulmonary embolism [Z86.711] 06/08/2020 Yes    Hypokalemia [E87.6] 06/08/2020 Yes    Intermittent asthma without complication [J45.20] 01/10/2020 Yes    Trigeminal  neuralgia [G50.0] 03/20/2018 Yes    Anxiety [F41.9] 03/20/2018 Yes    Hypertension [I10] 04/16/2017 Yes    Sickle-cell disease with pain [D57.00] 04/16/2017 Yes      Problems Resolved During this Admission:       Discharged Condition: good    Disposition: Home or Self Care    Follow Up:   Follow-up Information       Pee Montgomery MD Follow up.    Specialty: Hematology and Oncology  Contact information:  [address]  [facility] [address]  [phone]                           Patient Instructions:      Ambulatory referral/consult to Outpatient Case Management   Referral Priority: Routine Referral Type: Consultation   Referral Reason: Specialty Services Required   Number of Visits Requested: 1       Significant Diagnostic Studies: N/A    Pending Diagnostic Studies:       None           Medications:  Reconciled Home Medications:      Medication List        START taking these medications      enoxaparin 80 mg/0.8 mL Syrg  Commonly known as: LOVENOX  DISCHARGE 0.1mls, THEN Inject 0.7 mLs (70 mg total) into the skin 2 (two) times daily.  Replaces: enoxaparin 120 mg/0.8 mL Syrg     oxyCODONE 20 mg Tab immediate release tablet  Commonly known as: ROXICODONE  Take 2 tablets (40 mg total) by mouth every 4 (four) hours as needed for Pain.            CONTINUE taking these medications      acetaminophen 500 MG tablet  Commonly known as: TYLENOL  Take 1,000 mg by mouth daily as needed for Pain.     albuterol 90 mcg/actuation inhaler  Commonly known as: VENTOLIN HFA  Inhale 2 puffs into the lungs every 6 (six) hours as needed for Wheezing or Shortness of Breath. Rescue     amLODIPine 10 MG tablet  Commonly known as: NORVASC  Take 1 tablet (10 mg total) by mouth once daily.     carvediloL 25 MG tablet  Commonly known as: COREG  Take 1 tablet (25 mg total) by mouth 2 (two) times daily.     CONSTULOSE 10 gram/15 mL solution  Generic drug: lactulose  Take 30 mLs (20 g total) by mouth every evening.     FLUoxetine 40 MG  capsule  Take 1 capsule (40 mg total) by mouth once daily.     fluticasone propionate 50 mcg/actuation nasal spray  Commonly known as: FLONASE  INSTILL 1 SPRAY INTO EACH NOSTRIL EVERY DAY     folic acid 1 MG tablet  Commonly known as: FOLVITE  Take 1 tablet (1 mg total) by mouth once daily.     gabapentin 400 MG capsule  Commonly known as: NEURONTIN  Take 1 capsule (400 mg total) by mouth 2 (two) times daily.     morphine 30 MG 12 hr tablet  Commonly known as: MS CONTIN  Take 1 tablet (30 mg total) by mouth every 12 (twelve) hours.     oxyCODONE-acetaminophen  mg per tablet  Commonly known as: PERCOCET  Take 1 tablet by mouth every 6 (six) hours as needed for Pain.     promethazine 25 MG tablet  Commonly known as: PHENERGAN  Take 1 tablet (25 mg total) by mouth every 6 (six) hours as needed for Nausea.     senna-docusate 8.6-50 mg 8.6-50 mg per tablet  Commonly known as: PERICOLACE  Take 1 tablet by mouth daily as needed for Constipation.     tiZANidine 4 MG tablet  Commonly known as: ZANAFLEX  Take 1 tablet (4 mg total) by mouth every 8 (eight) hours as needed.     VITAMIN C ORAL  Take 1 capsule by mouth once daily.            STOP taking these medications      enoxaparin 120 mg/0.8 mL Syrg  Commonly known as: LOVENOX  Replaced by: enoxaparin 80 mg/0.8 mL Syrg              Indwelling Lines/Drains at time of discharge:   Lines/Drains/Airways       Central Venous Catheter Line  Duration             Port A Cath Single Lumen 09/13/24 Subclavian Right 6 days              Drain  Duration             Female External Urinary Catheter w/ Suction 09/14/24  5 days                    Time spent on the discharge of patient: 35 minutes         Shlomo Albert MD  Department of Hospital Medicine  [facility] Surg   Dr. deanna rangel

## 2024-09-19 NOTE — PLAN OF CARE
Problem: Adult Inpatient Plan of Care  Goal: Plan of Care Review  Outcome: Progressing  Goal: Patient-Specific Goal (Individualized)  Outcome: Progressing  Goal: Absence of Hospital-Acquired Illness or Injury  Outcome: Progressing  Goal: Optimal Comfort and Wellbeing  Outcome: Progressing  Goal: Readiness for Transition of Care  Outcome: Progressing     Problem: Bariatric Environmental Safety  Goal: Safety Maintained with Care  Outcome: Progressing     Problem: Acute Kidney Injury/Impairment  Goal: Fluid and Electrolyte Balance  Outcome: Progressing  Goal: Improved Oral Intake  Outcome: Progressing  Goal: Effective Renal Function  Outcome: Progressing     Problem: Pneumonia  Goal: Fluid Balance  Outcome: Progressing

## 2024-09-19 NOTE — PLAN OF CARE
Problem: Adult Inpatient Plan of Care  Goal: Plan of Care Review  Outcome: Progressing  Goal: Patient-Specific Goal (Individualized)  Outcome: Progressing  Goal: Absence of Hospital-Acquired Illness or Injury  Outcome: Progressing  Goal: Optimal Comfort and Wellbeing  Outcome: Progressing  Goal: Readiness for Transition of Care  Outcome: Progressing     Problem: Bariatric Environmental Safety  Goal: Safety Maintained with Care  Outcome: Progressing     Problem: Acute Kidney Injury/Impairment  Goal: Fluid and Electrolyte Balance  Outcome: Progressing  Goal: Improved Oral Intake  Outcome: Progressing  Goal: Effective Renal Function  Outcome: Progressing     Problem: Pneumonia  Goal: Fluid Balance  Outcome: Progressing  Goal: Resolution of Infection Signs and Symptoms  Outcome: Progressing  Goal: Effective Oxygenation and Ventilation  Outcome: Progressing     Problem: Infection  Goal: Absence of Infection Signs and Symptoms  Outcome: Progressing     Problem: Wound  Goal: Optimal Coping  Outcome: Progressing  Goal: Optimal Functional Ability  Outcome: Progressing  Goal: Absence of Infection Signs and Symptoms  Outcome: Progressing  Goal: Improved Oral Intake  Outcome: Progressing  Goal: Optimal Pain Control and Function  Outcome: Progressing  Goal: Skin Health and Integrity  Outcome: Progressing  Goal: Optimal Wound Healing  Outcome: Progressing

## 2024-09-19 NOTE — SUBJECTIVE & OBJECTIVE
Interval History: Pt with continued pain, but willing to transition to entirely oral regimen to facilitate DC home to see her family and to continue recovery at home. States she has scheduled follow up in next week.    Review of Systems   Constitutional:  Negative for activity change, appetite change, diaphoresis and fever.   Respiratory:  Negative for cough, shortness of breath and wheezing.    Cardiovascular:  Negative for chest pain and leg swelling.   Gastrointestinal:  Negative for abdominal pain, constipation, diarrhea, nausea and vomiting.   Musculoskeletal:  Positive for arthralgias, back pain and myalgias.   Skin:  Negative for rash.   Neurological:  Negative for headaches.     Objective:     Vital Signs (Most Recent):  Temp: 99.5 °F (37.5 °C) (09/19/24 1459)  Pulse: 91 (09/19/24 1459)  Resp: 16 (09/19/24 1459)  BP: (!) 140/78 (09/19/24 1459)  SpO2: (!) 94 % (09/19/24 1459) Vital Signs (24h Range):  Temp:  [98.3 °F (36.8 °C)-99.5 °F (37.5 °C)] 99.5 °F (37.5 °C)  Pulse:  [75-92] 91  Resp:  [14-20] 16  SpO2:  [93 %-98 %] 94 %  BP: (122-140)/(64-85) 140/78     Weight: 109.3 kg (241 lb)  Body mass index is 44.08 kg/m².    Intake/Output Summary (Last 24 hours) at 9/19/2024 1637  Last data filed at 9/19/2024 1117  Gross per 24 hour   Intake --   Output 900 ml   Net -900 ml         Physical Exam  Vitals and nursing note reviewed.   HENT:      Head: Normocephalic and atraumatic.   Cardiovascular:      Rate and Rhythm: Normal rate and regular rhythm.      Pulses: Normal pulses.   Pulmonary:      Effort: Pulmonary effort is normal. No respiratory distress.      Breath sounds: No wheezing or rales.   Abdominal:      Palpations: Abdomen is soft.      Tenderness: There is no abdominal tenderness. There is no guarding.   Skin:     General: Skin is warm and dry.      Findings: Bruising present.      Comments: Contusion to R lateral thigh, L medial ankle   Neurological:      General: No focal deficit present.      Mental  Status: She is alert and oriented to person, place, and time.   Psychiatric:         Mood and Affect: Mood normal.             Significant Labs: All pertinent labs within the past 24 hours have been reviewed.    Significant Imaging: I have reviewed all pertinent imaging results/findings within the past 24 hours.

## 2024-09-19 NOTE — PLAN OF CARE
[facility]  Discharge Final Note    Primary Care Provider: Pee Montgomery MD    Expected Discharge Date: 9/19/2024    Final Discharge Note (most recent)       Final Note - 09/19/24 1241          Final Note    Assessment Type Final Discharge Note     Anticipated Discharge Disposition Home or Self Care     Hospital Resources/Appts/Education Provided Appointments scheduled and added to AVS        Post-Acute Status    Post-Acute Authorization Other     Other Status No Post-Acute Service Needs     Discharge Delays None known at this time                     Important Message from Medicare  Important Message from Medicare regarding Discharge Appeal Rights: Given to patient/caregiver, Explained to patient/caregiver, Signed/date by patient/caregiver     Date IMM was signed: 09/19/24  Time IMM was signed: 1012

## 2024-09-22 PROCEDURE — 93005 ELECTROCARDIOGRAM TRACING: CPT

## 2024-09-22 PROCEDURE — 96361 HYDRATE IV INFUSION ADD-ON: CPT

## 2024-09-22 PROCEDURE — 96376 TX/PRO/DX INJ SAME DRUG ADON: CPT

## 2024-09-22 PROCEDURE — 99285 EMERGENCY DEPT VISIT HI MDM: CPT | Mod: 25

## 2024-09-22 PROCEDURE — 94761 N-INVAS EAR/PLS OXIMETRY MLT: CPT

## 2024-09-22 PROCEDURE — 93010 ELECTROCARDIOGRAM REPORT: CPT | Mod: ,,, | Performed by: INTERNAL MEDICINE

## 2024-09-23 ENCOUNTER — HOSPITAL ENCOUNTER (INPATIENT)
Facility: HOSPITAL | Age: 44
LOS: 9 days | Discharge: HOME | DRG: 812 | End: 2024-10-02
Attending: EMERGENCY MEDICINE | Admitting: INTERNAL MEDICINE
Payer: MEDICARE

## 2024-09-23 DIAGNOSIS — D57.00 SICKLE CELL PAIN CRISIS: ICD-10-CM

## 2024-09-23 DIAGNOSIS — D57.00 SICKLE CELL ANEMIA WITH CRISIS: ICD-10-CM

## 2024-09-23 DIAGNOSIS — E87.6 HYPOKALEMIA: ICD-10-CM

## 2024-09-23 DIAGNOSIS — R07.9 CHEST PAIN: ICD-10-CM

## 2024-09-23 DIAGNOSIS — D57.439 SICKLE CELL DISEASE, TYPE S BETA-ZERO THALASSEMIA WITH CRISIS: ICD-10-CM

## 2024-09-23 PROBLEM — D57.01 ACUTE CHEST SYNDROME: Status: RESOLVED | Noted: 2017-04-29 | Resolved: 2024-09-23

## 2024-09-23 LAB
ALBUMIN SERPL BCP-MCNC: 3.9 G/DL (ref 3.5–5.2)
ALP SERPL-CCNC: 86 U/L (ref 55–135)
ALT SERPL W/O P-5'-P-CCNC: 12 U/L (ref 10–44)
ANION GAP SERPL CALC-SCNC: 14 MMOL/L (ref 8–16)
AST SERPL-CCNC: 24 U/L (ref 10–40)
B-HCG UR QL: NEGATIVE
BASOPHILS # BLD AUTO: 0.04 K/UL (ref 0–0.2)
BASOPHILS NFR BLD: 0.4 % (ref 0–1.9)
BILIRUB SERPL-MCNC: 0.8 MG/DL (ref 0.1–1)
BILIRUB UR QL STRIP: NEGATIVE
BUN SERPL-MCNC: 10 MG/DL (ref 6–20)
CALCIUM SERPL-MCNC: 10.4 MG/DL (ref 8.7–10.5)
CHLORIDE SERPL-SCNC: 105 MMOL/L (ref 95–110)
CLARITY UR REFRACT.AUTO: CLEAR
CO2 SERPL-SCNC: 19 MMOL/L (ref 23–29)
COLOR UR AUTO: YELLOW
CREAT SERPL-MCNC: 1 MG/DL (ref 0.5–1.4)
CTP QC/QA: YES
D DIMER PPP IA.FEU-MCNC: 1.48 MG/L FEU
DIFFERENTIAL METHOD BLD: ABNORMAL
EOSINOPHIL # BLD AUTO: 0 K/UL (ref 0–0.5)
EOSINOPHIL NFR BLD: 0 % (ref 0–8)
ERYTHROCYTE [DISTWIDTH] IN BLOOD BY AUTOMATED COUNT: 19 % (ref 11.5–14.5)
EST. GFR  (NO RACE VARIABLE): >60 ML/MIN/1.73 M^2
GLUCOSE SERPL-MCNC: 116 MG/DL (ref 70–110)
GLUCOSE UR QL STRIP: NEGATIVE
HCT VFR BLD AUTO: 27.1 % (ref 37–48.5)
HGB BLD-MCNC: 9.3 G/DL (ref 12–16)
HGB UR QL STRIP: NEGATIVE
IMM GRANULOCYTES # BLD AUTO: 0.04 K/UL (ref 0–0.04)
IMM GRANULOCYTES NFR BLD AUTO: 0.4 % (ref 0–0.5)
KETONES UR QL STRIP: ABNORMAL
LEUKOCYTE ESTERASE UR QL STRIP: NEGATIVE
LYMPHOCYTES # BLD AUTO: 1.5 K/UL (ref 1–4.8)
LYMPHOCYTES NFR BLD: 14.5 % (ref 18–48)
MCH RBC QN AUTO: 24.4 PG (ref 27–31)
MCHC RBC AUTO-ENTMCNC: 34.3 G/DL (ref 32–36)
MCV RBC AUTO: 71 FL (ref 82–98)
MONOCYTES # BLD AUTO: 0.8 K/UL (ref 0.3–1)
MONOCYTES NFR BLD: 7.6 % (ref 4–15)
NEUTROPHILS # BLD AUTO: 7.8 K/UL (ref 1.8–7.7)
NEUTROPHILS NFR BLD: 77.1 % (ref 38–73)
NITRITE UR QL STRIP: NEGATIVE
NRBC BLD-RTO: 2 /100 WBC
OHS QRS DURATION: 94 MS
OHS QTC CALCULATION: 486 MS
PH UR STRIP: 7 [PH] (ref 5–8)
PLATELET # BLD AUTO: 799 K/UL (ref 150–450)
PMV BLD AUTO: 9.6 FL (ref 9.2–12.9)
POTASSIUM SERPL-SCNC: 3.1 MMOL/L (ref 3.5–5.1)
PROT SERPL-MCNC: 8.5 G/DL (ref 6–8.4)
PROT UR QL STRIP: NEGATIVE
RBC # BLD AUTO: 3.81 M/UL (ref 4–5.4)
RETICS/RBC NFR AUTO: 1.6 % (ref 0.5–2.5)
SODIUM SERPL-SCNC: 138 MMOL/L (ref 136–145)
SP GR UR STRIP: 1.01 (ref 1–1.03)
TROPONIN I SERPL DL<=0.01 NG/ML-MCNC: 0.01 NG/ML (ref 0–0.03)
URN SPEC COLLECT METH UR: ABNORMAL
WBC # BLD AUTO: 10.13 K/UL (ref 3.9–12.7)

## 2024-09-23 PROCEDURE — 25000003 PHARM REV CODE 250

## 2024-09-23 PROCEDURE — 63600175 PHARM REV CODE 636 W HCPCS

## 2024-09-23 PROCEDURE — 81003 URINALYSIS AUTO W/O SCOPE: CPT

## 2024-09-23 PROCEDURE — 11000001 HC ACUTE MED/SURG PRIVATE ROOM

## 2024-09-23 PROCEDURE — 85025 COMPLETE CBC W/AUTO DIFF WBC: CPT

## 2024-09-23 PROCEDURE — 96372 THER/PROPH/DIAG INJ SC/IM: CPT

## 2024-09-23 PROCEDURE — S5010 5% DEXTROSE AND 0.45% SALINE: HCPCS

## 2024-09-23 PROCEDURE — 84484 ASSAY OF TROPONIN QUANT: CPT

## 2024-09-23 PROCEDURE — 96375 TX/PRO/DX INJ NEW DRUG ADDON: CPT

## 2024-09-23 PROCEDURE — 80053 COMPREHEN METABOLIC PANEL: CPT

## 2024-09-23 PROCEDURE — 85379 FIBRIN DEGRADATION QUANT: CPT

## 2024-09-23 PROCEDURE — 96365 THER/PROPH/DIAG IV INF INIT: CPT

## 2024-09-23 PROCEDURE — 21400001 HC TELEMETRY ROOM

## 2024-09-23 PROCEDURE — 25000003 PHARM REV CODE 250: Performed by: INTERNAL MEDICINE

## 2024-09-23 PROCEDURE — 85045 AUTOMATED RETICULOCYTE COUNT: CPT

## 2024-09-23 PROCEDURE — 81025 URINE PREGNANCY TEST: CPT

## 2024-09-23 RX ORDER — PROCHLORPERAZINE MALEATE 5 MG
5 TABLET ORAL 3 TIMES DAILY PRN
Status: DISCONTINUED | OUTPATIENT
Start: 2024-09-23 | End: 2024-09-24

## 2024-09-23 RX ORDER — PROCHLORPERAZINE EDISYLATE 5 MG/ML
10 INJECTION INTRAMUSCULAR; INTRAVENOUS
Status: COMPLETED | OUTPATIENT
Start: 2024-09-23 | End: 2024-09-23

## 2024-09-23 RX ORDER — ALBUTEROL SULFATE 90 UG/1
2 INHALANT RESPIRATORY (INHALATION) EVERY 6 HOURS PRN
Status: DISCONTINUED | OUTPATIENT
Start: 2024-09-23 | End: 2024-10-02 | Stop reason: HOSPADM

## 2024-09-23 RX ORDER — HYDROMORPHONE HYDROCHLORIDE 1 MG/ML
2 INJECTION, SOLUTION INTRAMUSCULAR; INTRAVENOUS; SUBCUTANEOUS
Status: COMPLETED | OUTPATIENT
Start: 2024-09-23 | End: 2024-09-23

## 2024-09-23 RX ORDER — LACTULOSE 10 G/15ML
20 SOLUTION ORAL NIGHTLY
Status: DISCONTINUED | OUTPATIENT
Start: 2024-09-23 | End: 2024-10-02 | Stop reason: HOSPADM

## 2024-09-23 RX ORDER — GABAPENTIN 400 MG/1
400 CAPSULE ORAL 2 TIMES DAILY
Status: DISCONTINUED | OUTPATIENT
Start: 2024-09-23 | End: 2024-09-24

## 2024-09-23 RX ORDER — HYDROMORPHONE HYDROCHLORIDE 1 MG/ML
1 INJECTION, SOLUTION INTRAMUSCULAR; INTRAVENOUS; SUBCUTANEOUS
Status: DISCONTINUED | OUTPATIENT
Start: 2024-09-23 | End: 2024-09-23

## 2024-09-23 RX ORDER — TIZANIDINE 2 MG/1
4 TABLET ORAL EVERY 8 HOURS
Status: DISCONTINUED | OUTPATIENT
Start: 2024-09-23 | End: 2024-09-23

## 2024-09-23 RX ORDER — ACETAMINOPHEN 500 MG
1000 TABLET ORAL DAILY PRN
Status: DISCONTINUED | OUTPATIENT
Start: 2024-09-23 | End: 2024-09-23

## 2024-09-23 RX ORDER — FLUTICASONE PROPIONATE 50 MCG
2 SPRAY, SUSPENSION (ML) NASAL DAILY
Status: DISCONTINUED | OUTPATIENT
Start: 2024-09-23 | End: 2024-10-02 | Stop reason: HOSPADM

## 2024-09-23 RX ORDER — AMLODIPINE BESYLATE 10 MG/1
10 TABLET ORAL DAILY
Status: DISCONTINUED | OUTPATIENT
Start: 2024-09-23 | End: 2024-10-02 | Stop reason: HOSPADM

## 2024-09-23 RX ORDER — ENOXAPARIN SODIUM 100 MG/ML
70 INJECTION SUBCUTANEOUS EVERY 12 HOURS
Status: DISCONTINUED | OUTPATIENT
Start: 2024-09-23 | End: 2024-09-28

## 2024-09-23 RX ORDER — DIPHENHYDRAMINE HCL 25 MG
25 CAPSULE ORAL EVERY 6 HOURS PRN
Status: DISCONTINUED | OUTPATIENT
Start: 2024-09-23 | End: 2024-10-02 | Stop reason: HOSPADM

## 2024-09-23 RX ORDER — GLUCAGON 1 MG
1 KIT INJECTION
Status: DISCONTINUED | OUTPATIENT
Start: 2024-09-23 | End: 2024-10-02 | Stop reason: HOSPADM

## 2024-09-23 RX ORDER — ACETAMINOPHEN 500 MG
1000 TABLET ORAL DAILY
Status: DISCONTINUED | OUTPATIENT
Start: 2024-09-23 | End: 2024-09-23

## 2024-09-23 RX ORDER — HYDROMORPHONE HYDROCHLORIDE 1 MG/ML
2 INJECTION, SOLUTION INTRAMUSCULAR; INTRAVENOUS; SUBCUTANEOUS EVERY 4 HOURS PRN
Status: DISCONTINUED | OUTPATIENT
Start: 2024-09-23 | End: 2024-09-24

## 2024-09-23 RX ORDER — HYDROMORPHONE HYDROCHLORIDE 1 MG/ML
3 INJECTION, SOLUTION INTRAMUSCULAR; INTRAVENOUS; SUBCUTANEOUS
Status: COMPLETED | OUTPATIENT
Start: 2024-09-23 | End: 2024-09-23

## 2024-09-23 RX ORDER — NALOXONE HCL 0.4 MG/ML
0.02 VIAL (ML) INJECTION
Status: DISCONTINUED | OUTPATIENT
Start: 2024-09-23 | End: 2024-09-27

## 2024-09-23 RX ORDER — ONDANSETRON HYDROCHLORIDE 2 MG/ML
4 INJECTION, SOLUTION INTRAVENOUS
Status: COMPLETED | OUTPATIENT
Start: 2024-09-23 | End: 2024-09-23

## 2024-09-23 RX ORDER — ACETAMINOPHEN 500 MG
1000 TABLET ORAL EVERY 8 HOURS
Status: DISCONTINUED | OUTPATIENT
Start: 2024-09-23 | End: 2024-10-02 | Stop reason: HOSPADM

## 2024-09-23 RX ORDER — IBUPROFEN 200 MG
16 TABLET ORAL
Status: DISCONTINUED | OUTPATIENT
Start: 2024-09-23 | End: 2024-10-02 | Stop reason: HOSPADM

## 2024-09-23 RX ORDER — AMOXICILLIN 250 MG
1 CAPSULE ORAL DAILY PRN
Status: DISCONTINUED | OUTPATIENT
Start: 2024-09-23 | End: 2024-09-23

## 2024-09-23 RX ORDER — DIPHENHYDRAMINE HYDROCHLORIDE 50 MG/ML
25 INJECTION INTRAMUSCULAR; INTRAVENOUS EVERY 6 HOURS PRN
Status: DISCONTINUED | OUTPATIENT
Start: 2024-09-23 | End: 2024-09-23

## 2024-09-23 RX ORDER — DEXTROSE MONOHYDRATE AND SODIUM CHLORIDE 5; .45 G/100ML; G/100ML
INJECTION, SOLUTION INTRAVENOUS CONTINUOUS
Status: ACTIVE | OUTPATIENT
Start: 2024-09-23 | End: 2024-09-26

## 2024-09-23 RX ORDER — AMOXICILLIN 250 MG
1 CAPSULE ORAL DAILY
Status: DISCONTINUED | OUTPATIENT
Start: 2024-09-23 | End: 2024-10-02 | Stop reason: HOSPADM

## 2024-09-23 RX ORDER — IBUPROFEN 200 MG
24 TABLET ORAL
Status: DISCONTINUED | OUTPATIENT
Start: 2024-09-23 | End: 2024-10-02 | Stop reason: HOSPADM

## 2024-09-23 RX ORDER — MORPHINE SULFATE 2 MG/ML
0.1 INJECTION, SOLUTION INTRAMUSCULAR; INTRAVENOUS
Status: DISCONTINUED | OUTPATIENT
Start: 2024-09-23 | End: 2024-09-23

## 2024-09-23 RX ORDER — FOLIC ACID 1 MG/1
1 TABLET ORAL DAILY
Status: DISCONTINUED | OUTPATIENT
Start: 2024-09-23 | End: 2024-10-02 | Stop reason: HOSPADM

## 2024-09-23 RX ORDER — METHOCARBAMOL 750 MG/1
750 TABLET, FILM COATED ORAL 4 TIMES DAILY
Status: DISCONTINUED | OUTPATIENT
Start: 2024-09-23 | End: 2024-10-02 | Stop reason: HOSPADM

## 2024-09-23 RX ORDER — FLUOXETINE HYDROCHLORIDE 20 MG/1
40 CAPSULE ORAL DAILY
Status: DISCONTINUED | OUTPATIENT
Start: 2024-09-23 | End: 2024-10-02 | Stop reason: HOSPADM

## 2024-09-23 RX ORDER — CARVEDILOL 25 MG/1
25 TABLET ORAL 2 TIMES DAILY
Status: DISCONTINUED | OUTPATIENT
Start: 2024-09-23 | End: 2024-10-02 | Stop reason: HOSPADM

## 2024-09-23 RX ORDER — POTASSIUM CHLORIDE 7.45 MG/ML
10 INJECTION INTRAVENOUS
Status: COMPLETED | OUTPATIENT
Start: 2024-09-23 | End: 2024-09-23

## 2024-09-23 RX ORDER — SODIUM CHLORIDE, SODIUM LACTATE, POTASSIUM CHLORIDE, CALCIUM CHLORIDE 600; 310; 30; 20 MG/100ML; MG/100ML; MG/100ML; MG/100ML
INJECTION, SOLUTION INTRAVENOUS CONTINUOUS
Status: DISCONTINUED | OUTPATIENT
Start: 2024-09-23 | End: 2024-09-23

## 2024-09-23 RX ORDER — SODIUM CHLORIDE 0.9 % (FLUSH) 0.9 %
10 SYRINGE (ML) INJECTION EVERY 12 HOURS PRN
Status: DISCONTINUED | OUTPATIENT
Start: 2024-09-23 | End: 2024-10-02 | Stop reason: HOSPADM

## 2024-09-23 RX ORDER — HEPARIN SODIUM 5000 [USP'U]/ML
5000 INJECTION, SOLUTION INTRAVENOUS; SUBCUTANEOUS EVERY 8 HOURS
Status: DISCONTINUED | OUTPATIENT
Start: 2024-09-23 | End: 2024-09-23

## 2024-09-23 RX ADMIN — PROCHLORPERAZINE MALEATE 5 MG: 5 TABLET ORAL at 06:09

## 2024-09-23 RX ADMIN — TIZANIDINE 4 MG: 2 TABLET ORAL at 06:09

## 2024-09-23 RX ADMIN — ACETAMINOPHEN 1000 MG: 500 TABLET ORAL at 10:09

## 2024-09-23 RX ADMIN — DEXTROSE AND SODIUM CHLORIDE: 5; 450 INJECTION, SOLUTION INTRAVENOUS at 08:09

## 2024-09-23 RX ADMIN — METHOCARBAMOL 750 MG: 750 TABLET ORAL at 05:09

## 2024-09-23 RX ADMIN — ENOXAPARIN SODIUM 70 MG: 80 INJECTION SUBCUTANEOUS at 09:09

## 2024-09-23 RX ADMIN — POTASSIUM CHLORIDE 10 MEQ: 7.46 INJECTION, SOLUTION INTRAVENOUS at 03:09

## 2024-09-23 RX ADMIN — ACETAMINOPHEN 1000 MG: 500 TABLET ORAL at 09:09

## 2024-09-23 RX ADMIN — METHOCARBAMOL 750 MG: 750 TABLET ORAL at 09:09

## 2024-09-23 RX ADMIN — OXYCODONE HYDROCHLORIDE 15 MG: 10 TABLET ORAL at 08:09

## 2024-09-23 RX ADMIN — HYDROMORPHONE HYDROCHLORIDE 2 MG: 1 INJECTION, SOLUTION INTRAMUSCULAR; INTRAVENOUS; SUBCUTANEOUS at 06:09

## 2024-09-23 RX ADMIN — SODIUM CHLORIDE, SODIUM LACTATE, POTASSIUM CHLORIDE, AND CALCIUM CHLORIDE 1000 ML: .6; .31; .03; .02 INJECTION, SOLUTION INTRAVENOUS at 12:09

## 2024-09-23 RX ADMIN — OXYCODONE HYDROCHLORIDE 15 MG: 10 TABLET ORAL at 12:09

## 2024-09-23 RX ADMIN — ENOXAPARIN SODIUM 70 MG: 80 INJECTION SUBCUTANEOUS at 08:09

## 2024-09-23 RX ADMIN — HYDROMORPHONE HYDROCHLORIDE 2 MG: 1 INJECTION, SOLUTION INTRAMUSCULAR; INTRAVENOUS; SUBCUTANEOUS at 03:09

## 2024-09-23 RX ADMIN — HYDROMORPHONE HYDROCHLORIDE 3 MG: 1 INJECTION, SOLUTION INTRAMUSCULAR; INTRAVENOUS; SUBCUTANEOUS at 12:09

## 2024-09-23 RX ADMIN — METHOCARBAMOL 750 MG: 750 TABLET ORAL at 12:09

## 2024-09-23 RX ADMIN — ACETAMINOPHEN 1000 MG: 500 TABLET ORAL at 06:09

## 2024-09-23 RX ADMIN — ONDANSETRON 4 MG: 2 INJECTION INTRAMUSCULAR; INTRAVENOUS at 01:09

## 2024-09-23 RX ADMIN — FOLIC ACID 1 MG: 1 TABLET ORAL at 08:09

## 2024-09-23 RX ADMIN — HYDROMORPHONE HYDROCHLORIDE 2 MG: 1 INJECTION, SOLUTION INTRAMUSCULAR; INTRAVENOUS; SUBCUTANEOUS at 11:09

## 2024-09-23 RX ADMIN — CARVEDILOL 25 MG: 25 TABLET, FILM COATED ORAL at 08:09

## 2024-09-23 RX ADMIN — GABAPENTIN 400 MG: 300 CAPSULE ORAL at 09:09

## 2024-09-23 RX ADMIN — HYDROMORPHONE HYDROCHLORIDE 2 MG: 1 INJECTION, SOLUTION INTRAMUSCULAR; INTRAVENOUS; SUBCUTANEOUS at 10:09

## 2024-09-23 RX ADMIN — PROCHLORPERAZINE EDISYLATE 10 MG: 5 INJECTION INTRAMUSCULAR; INTRAVENOUS at 03:09

## 2024-09-23 RX ADMIN — CARVEDILOL 25 MG: 25 TABLET, FILM COATED ORAL at 09:09

## 2024-09-23 RX ADMIN — DEXTROSE AND SODIUM CHLORIDE: 5; 450 INJECTION, SOLUTION INTRAVENOUS at 06:09

## 2024-09-23 RX ADMIN — AMLODIPINE BESYLATE 10 MG: 10 TABLET ORAL at 08:09

## 2024-09-23 RX ADMIN — OXYCODONE HYDROCHLORIDE 15 MG: 10 TABLET ORAL at 09:09

## 2024-09-23 RX ADMIN — OXYCODONE HYDROCHLORIDE 15 MG: 10 TABLET ORAL at 05:09

## 2024-09-23 RX ADMIN — PROCHLORPERAZINE MALEATE 5 MG: 5 TABLET ORAL at 12:09

## 2024-09-23 RX ADMIN — GABAPENTIN 400 MG: 300 CAPSULE ORAL at 08:09

## 2024-09-23 RX ADMIN — DIPHENHYDRAMINE HYDROCHLORIDE 25 MG: 25 CAPSULE ORAL at 06:09

## 2024-09-23 RX ADMIN — HYDROMORPHONE HYDROCHLORIDE 2 MG: 1 INJECTION, SOLUTION INTRAMUSCULAR; INTRAVENOUS; SUBCUTANEOUS at 02:09

## 2024-09-23 RX ADMIN — METHOCARBAMOL 750 MG: 750 TABLET ORAL at 08:09

## 2024-09-23 RX ADMIN — FLUOXETINE HYDROCHLORIDE 40 MG: 20 CAPSULE ORAL at 08:09

## 2024-09-23 NOTE — ED NOTES
Assumed care of the patient. Report received from FRANCIS Hogan. Pt on continuous cardiac monitoring. Side rails up X2, bed low and locked, and call light is placed within reach. No family/visitors at bedside at this time.         LOC: The patient is awake, alert and aware of environment with an appropriate affect, the patient is oriented x 3 and speaking appropriately.   APPEARANCE: Patient appears comfortable and in no acute distress, patient is clean and well groomed.  SKIN: The skin is warm and dry, color consistent with ethnicity.   MUSCULOSKELETAL: Patient moving all extremities spontaneously, no swelling noted. Pt reports 10/10 generalized body pain.  RESPIRATORY: Airway is open and patent, respirations are spontaneous, patient has a normal effort and rate, no accessory muscle use noted.  CARDIAC: Patient has a normal rate and regular rhythm, no edema noted, capillary refill < 3 seconds.   GASTRO: Soft and non tender to palpation, no distention noted. Pt endorses nausea.  : Pt denies any pain or frequency with urination.  NEURO: Pt opens eyes spontaneously, behavior appropriate to situation, follows commands.

## 2024-09-23 NOTE — PHARMACY MED REC
"Admission Medication History     The home medication history was taken by Jose Hernandes.    You may go to "Admission" then "Reconcile Home Medications" tabs to review and/or act upon these items.     The home medication list has been updated by the Pharmacy department.   Please read ALL comments highlighted in yellow.   Please address this information as you see fit.    Feel free to contact us if you have any questions or require assistance.      Medications listed below were obtained from: Patient and Analytic software- [employer]  Current Outpatient Medications on File Prior to Encounter   Medication Sig    acetaminophen (TYLENOL) 500 MG tablet Take 1,000 mg by mouth daily as needed for Pain.    albuterol (VENTOLIN HFA) 90 mcg/actuation inhaler Inhale 2 puffs into the lungs every 6 (six) hours as needed for Wheezing or Shortness of Breath. Rescue    amLODIPine (NORVASC) 10 MG tablet Take 1 tablet (10 mg total) by mouth once daily.    ascorbic acid (VITAMIN C ORAL) Take 1 capsule by mouth once daily.    carvediloL (COREG) 25 MG tablet Take 1 tablet (25 mg total) by mouth 2 (two) times daily.    enoxaparin (LOVENOX) 80 mg/0.8 mL Syrg DISCHARGE 0.1mls, THEN Inject 0.7 mLs (70 mg total) into the skin 2 (two) times daily.    FLUoxetine 40 MG capsule Take 1 capsule (40 mg total) by mouth once daily.    fluticasone propionate (FLONASE) 50 mcg/actuation nasal spray Instill 1 spray into each nostril every day.    folic acid (FOLVITE) 1 MG tablet Take 1 tablet (1 mg total) by mouth once daily.    gabapentin (NEURONTIN) 400 MG capsule Take 1 capsule (400 mg total) by mouth 2 (two) times daily.    lactulose (CHRONULAC) 10 gram/15 mL solution Take 30 mLs (20 g total) by mouth every evening. (Patient taking differently: Take 20 g by mouth twice a week.)    morphine (MS CONTIN) 30 MG 12 hr tablet Take 1 tablet (30 mg total) by mouth every 12 (twelve) hours.    oxyCODONE-acetaminophen (PERCOCET)  mg per tablet Take 1 tablet by " mouth every 6 (six) hours as needed for Pain.    promethazine (PHENERGAN) 25 MG tablet Take 1 tablet (25 mg total) by mouth every 6 (six) hours as needed for Nausea.    senna-docusate 8.6-50 mg (PERICOLACE) 8.6-50 mg per tablet Take 1 tablet by mouth daily as needed for Constipation.    tiZANidine (ZANAFLEX) 4 MG tablet Take 1 tablet (4 mg total) by mouth every 8 (eight) hours as needed.for muscle spasms.       Potential issues to be addressed PRIOR TO DISCHARGE  Patient reported not taking the following medications: (ROXICODONE). These medications remain on the home medication list. Please address accordingly.     Patient requested refills for the following medications: (NORVASC, COREG, PROZAC, PHENERGAN, ZANAFLEX)            Jose Hernandes  [id number]                  .

## 2024-09-23 NOTE — HPI
Nazanin Malone is a 44 y.o. female with a hx of HTN, depression, asthma, sickle cell-beta thalassemia, PE, and carotid thrombosis presenting to the ED for chest pain and SOB. Patient says that yesterday morning she woke up with c/o of chest pain and SOB with pain being at 5/10. She took her daily medications and tried to rest but, there was no relief from the pain. Patient then decided to come to the ER for help as she has had acute chest syndrome before and thought that she may be experiencing it again. Patient was in the hospital last week for sickle cell pain crisis in the setting of small L medial malleolus avulsion and has been in and out of the hospital since June due to her sickle cell.    In the ED, patient was given a bolus of LR and received a total of 5mg of dilaudid for pain. Troponin negative at 0.006. D-dimer of 1.48 which appears to be baseline for patient as she takes lovenox at home. Reticulocytes are normal and not consistent with aplastic anemia. CXR showed no acute findings suggestive of acute chest syndrome.

## 2024-09-23 NOTE — ASSESSMENT & PLAN NOTE
Plan:  - patient should continue to take lactulose  - continue to take senna-docusate prn  - have patient ambulate as tolerated

## 2024-09-23 NOTE — ASSESSMENT & PLAN NOTE
Patient's most recent potassium results are listed below.   Recent Labs     09/23/24  0103   K 3.1*     Plan  - Replete potassium per protocol  - Monitor potassium Daily  - Patient's hypokalemia will be reevaluated after administration of potassium supplements

## 2024-09-23 NOTE — PLAN OF CARE
[facility] - Emergency Dept  Initial Discharge Assessment       Primary Care Provider: Pee Montgomery MD    Admission Diagnosis: Chest pain [R07.9]    Admission Date: 9/23/2024  Expected Discharge Date:     Pt stated she is independent with her ambulation and ADL's and does not require assistance or equipment.    Pt to d/c home with no needs when ready    Transition of Care Barriers: (P) None    Payor: [employer] MANAGED MEDICARE / Plan: [employer] MEDICARE PLAN HMO / Product Type: Medicare Advantage /     Extended Emergency Contact Information  Primary Emergency Contact: Jack aMlone  Mobile Phone: [phone]  Relation: Brother    Discharge Plan A: (P) Home  Discharge Plan B: (P) Home      [id number] [place], [place] - [address]  [address]  [facility] [address]  Phone: [phone] Fax: [phone]    [facility]  [address] [facility] [address]  Phone: [phone] Fax: [phone]    [facility]  [address]  [place] [address]  Phone: [phone] Fax: [phone]    [id number] [place] [facility] [place] - [address]  [address]  [place] [address]  Phone: [phone] Fax: [phone]    [id number] [place] - [address] AT [facility]  [address]  [place] [address]  Phone: [phone] Fax: [phone]    [id number] [place] [facility] [place] - [address]  [address]  [place] [address]  Phone: [phone] Fax: [phone]      Initial Assessment (most recent)       Adult Discharge Assessment - 09/23/24 0833          Discharge Assessment    Assessment Type Discharge Planning Assessment     Confirmed/corrected address, phone number and insurance Yes     Confirmed Demographics Correct on Facesheet     Source of Information patient     Does patient/caregiver understand observation status Yes     Reason For Admission Sickle-cell disease with pain     People in Home  child(jayce), adult;parent(s)     Facility Arrived From: home (P)      Do you expect to return to your current living situation? Yes (P)      Do you have help at home or someone to help you manage your care at home? No (P)      Prior to hospitilization cognitive status: Alert/Oriented;No Deficits (P)      Current cognitive status: No Deficits;Alert/Oriented (P)      Walking or Climbing Stairs Difficulty no (P)      Dressing/Bathing Difficulty no (P)      Home Accessibility stairs to enter home (P)      Number of Stairs, Main Entrance two (P)      Home Layout Able to live on 1st floor (P)      Equipment Currently Used at Home none (P)      Patient currently being followed by outpatient case management? No (P)      Do you currently have service(s) that help you manage your care at home? No (P)      Do you have any problems affording any of your prescribed medications? No (P)      Is the patient taking medications as prescribed? yes (P)      Who is going to help you get home at discharge? family/friends (P)      How do you get to doctors appointments? car, drives self (P)      Are you on dialysis? No (P)      Do you take coumadin? No (P)      Discharge Plan A Home (P)      Discharge Plan B Home (P)      DME Needed Upon Discharge  none (P)      Discharge Plan discussed with: Patient (P)      Transition of Care Barriers None (P)         Physical Activity    On average, how many days per week do you engage in moderate to strenuous exercise (like a brisk walk)? 0 days (P)      On average, how many minutes do you engage in exercise at this level? 0 min (P)         Financial Resource Strain    How hard is it for you to pay for the very basics like food, housing, medical care, and heating? Not very hard (P)         Housing Stability    In the last 12 months, was there a time when you were not able to pay the mortgage or rent on time? No (P)      At any time in the past 12 months, were you homeless or living in a shelter  (including now)? No (P)         Transportation Needs    Has the lack of transportation kept you from medical appointments, meetings, work or from getting things needed for daily living? No (P)         Food Insecurity    Within the past 12 months, you worried that your food would run out before you got the money to buy more. Never true (P)      Within the past 12 months, the food you bought just didn't last and you didn't have money to get more. Never true (P)         Stress    Do you feel stress - tense, restless, nervous, or anxious, or unable to sleep at night because your mind is troubled all the time - these days? To some extent (P)         Social Isolation    How often do you feel lonely or isolated from those around you?  Rarely (P)         Alcohol Use    Q1: How often do you have a drink containing alcohol? Never (P)      Q2: How many drinks containing alcohol do you have on a typical day when you are drinking? Patient does not drink (P)      Q3: How often do you have six or more drinks on one occasion? Never (P)         Utilities    In the past 12 months has the electric, gas, oil, or water company threatened to shut off services in your home? No (P)         Health Literacy    How often do you need to have someone help you when you read instructions, pamphlets, or other written material from your doctor or pharmacy? Rarely (P)         OTHER    Name(s) of People in Home adult daughter and mother (P)                       Mackenzie Wong CD, MSW, LMSW, RSW   Case Management  [facility]  Email: [email or web address]

## 2024-09-23 NOTE — SUBJECTIVE & OBJECTIVE
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    Asthma     Depression     Hypertension     Morbid obesity     Opioid dependence 2017    Pneumonia due to Streptococcus pneumoniae 2017    Right lower lobe pneumonia 2017    Sepsis due to Streptococcus pneumoniae 2017    Sickle cell-beta thalassemia disease with pain     Trigeminal neuralgia        Past Surgical History:   Procedure Laterality Date     SECTION      TONSILLECTOMY      TUBAL LIGATION         Review of patient's allergies indicates:   Allergen Reactions    Nsaids (non-steroidal anti-inflammatory drug) Itching and Anaphylaxis       No current facility-administered medications on file prior to encounter.     Current Outpatient Medications on File Prior to Encounter   Medication Sig    acetaminophen (TYLENOL) 500 MG tablet Take 1,000 mg by mouth daily as needed for Pain.    albuterol (VENTOLIN HFA) 90 mcg/actuation inhaler Inhale 2 puffs into the lungs every 6 (six) hours as needed for Wheezing or Shortness of Breath. Rescue    amLODIPine (NORVASC) 10 MG tablet Take 1 tablet (10 mg total) by mouth once daily.    ascorbic acid (VITAMIN C ORAL) Take 1 capsule by mouth once daily.    carvediloL (COREG) 25 MG tablet Take 1 tablet (25 mg total) by mouth 2 (two) times daily.    enoxaparin (LOVENOX) 80 mg/0.8 mL Syrg DISCHARGE 0.1mls, THEN Inject 0.7 mLs (70 mg total) into the skin 2 (two) times daily.    FLUoxetine 40 MG capsule Take 1 capsule (40 mg total) by mouth once daily.    fluticasone propionate (FLONASE) 50 mcg/actuation nasal spray INSTILL 1 SPRAY INTO EACH NOSTRIL EVERY DAY    folic acid (FOLVITE) 1 MG tablet Take 1 tablet (1 mg total) by mouth once daily.    gabapentin (NEURONTIN) 400 MG capsule Take 1 capsule (400 mg total) by mouth 2 (two) times daily.    lactulose (CHRONULAC) 10 gram/15 mL solution Take 30 mLs (20 g  total) by mouth every evening.    morphine (MS CONTIN) 30 MG 12 hr tablet Take 1 tablet (30 mg total) by mouth every 12 (twelve) hours.    oxyCODONE (ROXICODONE) 20 mg Tab immediate release tablet Take 2 tablets (40 mg total) by mouth every 4 (four) hours as needed for Pain.    oxyCODONE-acetaminophen (PERCOCET)  mg per tablet Take 1 tablet by mouth every 6 (six) hours as needed for Pain.    promethazine (PHENERGAN) 25 MG tablet Take 1 tablet (25 mg total) by mouth every 6 (six) hours as needed for Nausea.    senna-docusate 8.6-50 mg (PERICOLACE) 8.6-50 mg per tablet Take 1 tablet by mouth daily as needed for Constipation.    tiZANidine (ZANAFLEX) 4 MG tablet Take 1 tablet (4 mg total) by mouth every 8 (eight) hours as needed.     Family History       Problem Relation (Age of Onset)    Diabetes Mother    Heart disease Mother, Father          Tobacco Use    Smoking status: Never    Smokeless tobacco: Never   Substance and Sexual Activity    Alcohol use: No    Drug use: Yes     Types: Marijuana     Comment: periodically     Sexual activity: Not Currently     Birth control/protection: See Surgical Hx     Review of Systems   Constitutional:  Positive for chills, diaphoresis, fatigue and fever.   HENT:  Positive for ear pain. Negative for sore throat and trouble swallowing.    Eyes:  Negative for photophobia, pain and redness.   Respiratory:  Positive for chest tightness and shortness of breath. Negative for cough.    Cardiovascular:  Positive for chest pain and palpitations.   Gastrointestinal:  Positive for nausea and vomiting. Negative for blood in stool, constipation and diarrhea.   Genitourinary:  Negative for hematuria.   Musculoskeletal:  Negative for back pain, joint swelling, neck pain and neck stiffness.   Neurological:  Positive for seizures. Negative for dizziness, tremors, syncope, light-headedness and headaches.   Psychiatric/Behavioral:  Negative for agitation, confusion and hallucinations.       Objective:     Vital Signs (Most Recent):  Temp: 98.6 °F (37 °C) (09/22/24 2329)  Pulse: 102 (09/23/24 0202)  Resp: 18 (09/23/24 0308)  BP: (!) 187/95 (09/23/24 0202)  SpO2: 100 % (09/23/24 0202) Vital Signs (24h Range):  Temp:  [98.6 °F (37 °C)] 98.6 °F (37 °C)  Pulse:  [] 102  Resp:  [18-25] 18  SpO2:  [97 %-100 %] 100 %  BP: (144-187)/() 187/95     Weight: 109.3 kg (241 lb)  Body mass index is 44.08 kg/m².     Physical Exam  Constitutional:       General: She is not in acute distress.     Appearance: Normal appearance. She is not ill-appearing.   HENT:      Head: Normocephalic.      Nose: Nose normal.   Eyes:      Extraocular Movements: Extraocular movements intact.   Cardiovascular:      Rate and Rhythm: Normal rate and regular rhythm.      Pulses: Normal pulses.      Heart sounds: Murmur heard.      No friction rub. No gallop.   Pulmonary:      Effort: Pulmonary effort is normal. No respiratory distress.      Breath sounds: Normal breath sounds. No wheezing or rales.   Abdominal:      General: Bowel sounds are normal. There is no distension.      Palpations: Abdomen is soft.      Tenderness: There is no abdominal tenderness. There is no guarding.   Musculoskeletal:         General: Deformity present. No swelling. Normal range of motion.      Cervical back: Normal range of motion. No rigidity or tenderness.   Skin:     General: Skin is warm.   Neurological:      General: No focal deficit present.      Mental Status: She is alert and oriented to person, place, and time.   Psychiatric:         Mood and Affect: Mood normal.         Behavior: Behavior normal.                Significant Labs: All pertinent labs within the past 24 hours have been reviewed.  CBC:   Recent Labs   Lab 09/23/24  0103   WBC 10.13   HGB 9.3*   HCT 27.1*   *     CMP:   Recent Labs   Lab 09/23/24  0103      K 3.1*      CO2 19*   *   BUN 10   CREATININE 1.0   CALCIUM 10.4   PROT 8.5*   ALBUMIN 3.9  "  BILITOT 0.8   ALKPHOS 86   AST 24   ALT 12   ANIONGAP 14     Magnesium: No results for input(s): "MG" in the last 48 hours.    Significant Imaging: I have reviewed all pertinent imaging results/findings within the past 24 hours.  "

## 2024-09-23 NOTE — ASSESSMENT & PLAN NOTE
Nazanin Malone is a 44 y.o. female with a hx of HTN, PE, and sickle cell disease presenting to the ED for chest pain. Patient says that the chest pain started yesterday morning and she had no relief. She has be in and out of the hospital for sickle cell since June of this year.    Plan:   - keep patient hydrated with IV fluids  - use incentive spirometer to reduce incidence of acute chest syndrome  - continue VTE regimen  - take folic acid daily   - daily CBC  - benadryl prn for itching related to dilaudid

## 2024-09-23 NOTE — H&P
[facility] - Emergency Dept  [facility] Medicine  History & Physical    Patient Name: Nazanin Malone  MRN: [id number]  Patient Class: OP- Observation  Admission Date: 9/23/2024  Attending Physician: Maria Antonia Moore MD   Primary Care Provider: Pee Montgomery MD         Patient information was obtained from patient and ER records.     Subjective:     Principal Problem:Sickle-cell disease with pain    Chief Complaint:   Chief Complaint   Patient presents with    Chest Pain     Epigastric/Midsternal CP that started earlier today, does not radiate.        HPI: Nazanin Malone is a 44 y.o. female with a hx of HTN, depression, asthma, sickle cell-beta thalassemia, PE, and carotid thrombosis presenting to the ED for chest pain and SOB. Patient says that yesterday morning she woke up with c/o of chest pain and SOB with pain being at 5/10. She took her daily medications and tried to rest but, there was no relief from the pain. Patient then decided to come to the ER for help as she has had acute chest syndrome before and thought that she may be experiencing it again. Patient was in the hospital last week for sickle cell pain crisis in the setting of small L medial malleolus avulsion and has been in and out of the hospital since June due to her sickle cell.    In the ED, patient was given a bolus of LR and received a total of 5mg of dilaudid for pain. Troponin negative at 0.006. D-dimer of 1.48 which appears to be baseline for patient as she takes lovenox at home. Reticulocytes are normal and not consistent with aplastic anemia. CXR showed no acute findings suggestive of acute chest syndrome.    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    Asthma     Depression     Hypertension     Morbid obesity     Opioid dependence 4/16/2017    Pneumonia due to Streptococcus pneumoniae 4/25/2017    Right lower lobe  pneumonia 2017    Sepsis due to Streptococcus pneumoniae 2017    Sickle cell-beta thalassemia disease with pain     Trigeminal neuralgia        Past Surgical History:   Procedure Laterality Date     SECTION      TONSILLECTOMY      TUBAL LIGATION         Review of patient's allergies indicates:   Allergen Reactions    Nsaids (non-steroidal anti-inflammatory drug) Itching and Anaphylaxis       No current facility-administered medications on file prior to encounter.     Current Outpatient Medications on File Prior to Encounter   Medication Sig    acetaminophen (TYLENOL) 500 MG tablet Take 1,000 mg by mouth daily as needed for Pain.    albuterol (VENTOLIN HFA) 90 mcg/actuation inhaler Inhale 2 puffs into the lungs every 6 (six) hours as needed for Wheezing or Shortness of Breath. Rescue    amLODIPine (NORVASC) 10 MG tablet Take 1 tablet (10 mg total) by mouth once daily.    ascorbic acid (VITAMIN C ORAL) Take 1 capsule by mouth once daily.    carvediloL (COREG) 25 MG tablet Take 1 tablet (25 mg total) by mouth 2 (two) times daily.    enoxaparin (LOVENOX) 80 mg/0.8 mL Syrg DISCHARGE 0.1mls, THEN Inject 0.7 mLs (70 mg total) into the skin 2 (two) times daily.    FLUoxetine 40 MG capsule Take 1 capsule (40 mg total) by mouth once daily.    fluticasone propionate (FLONASE) 50 mcg/actuation nasal spray INSTILL 1 SPRAY INTO EACH NOSTRIL EVERY DAY    folic acid (FOLVITE) 1 MG tablet Take 1 tablet (1 mg total) by mouth once daily.    gabapentin (NEURONTIN) 400 MG capsule Take 1 capsule (400 mg total) by mouth 2 (two) times daily.    lactulose (CHRONULAC) 10 gram/15 mL solution Take 30 mLs (20 g total) by mouth every evening.    morphine (MS CONTIN) 30 MG 12 hr tablet Take 1 tablet (30 mg total) by mouth every 12 (twelve) hours.    oxyCODONE (ROXICODONE) 20 mg Tab immediate release tablet Take 2 tablets (40 mg total) by mouth every 4 (four) hours as needed for Pain.    oxyCODONE-acetaminophen (PERCOCET)   mg per tablet Take 1 tablet by mouth every 6 (six) hours as needed for Pain.    promethazine (PHENERGAN) 25 MG tablet Take 1 tablet (25 mg total) by mouth every 6 (six) hours as needed for Nausea.    senna-docusate 8.6-50 mg (PERICOLACE) 8.6-50 mg per tablet Take 1 tablet by mouth daily as needed for Constipation.    tiZANidine (ZANAFLEX) 4 MG tablet Take 1 tablet (4 mg total) by mouth every 8 (eight) hours as needed.     Family History       Problem Relation (Age of Onset)    Diabetes Mother    Heart disease Mother, Father          Tobacco Use    Smoking status: Never    Smokeless tobacco: Never   Substance and Sexual Activity    Alcohol use: No    Drug use: Yes     Types: Marijuana     Comment: periodically     Sexual activity: Not Currently     Birth control/protection: See Surgical Hx     Review of Systems   Constitutional:  Positive for chills, diaphoresis, fatigue and fever.   HENT:  Positive for ear pain. Negative for sore throat and trouble swallowing.    Eyes:  Negative for photophobia, pain and redness.   Respiratory:  Positive for chest tightness and shortness of breath. Negative for cough.    Cardiovascular:  Positive for chest pain and palpitations.   Gastrointestinal:  Positive for nausea and vomiting. Negative for blood in stool, constipation and diarrhea.   Genitourinary:  Negative for hematuria.   Musculoskeletal:  Negative for back pain, joint swelling, neck pain and neck stiffness.   Neurological:  Positive for seizures. Negative for dizziness, tremors, syncope, light-headedness and headaches.   Psychiatric/Behavioral:  Negative for agitation, confusion and hallucinations.      Objective:     Vital Signs (Most Recent):  Temp: 98.6 °F (37 °C) (09/22/24 2329)  Pulse: 102 (09/23/24 0202)  Resp: 18 (09/23/24 0308)  BP: (!) 187/95 (09/23/24 0202)  SpO2: 100 % (09/23/24 0202) Vital Signs (24h Range):  Temp:  [98.6 °F (37 °C)] 98.6 °F (37 °C)  Pulse:  [] 102  Resp:  [18-25] 18  SpO2:  [97 %-100 %]  "100 %  BP: (144-187)/() 187/95     Weight: 109.3 kg (241 lb)  Body mass index is 44.08 kg/m².     Physical Exam  Constitutional:       General: She is not in acute distress.     Appearance: Normal appearance. She is not ill-appearing.   HENT:      Head: Normocephalic.      Nose: Nose normal.   Eyes:      Extraocular Movements: Extraocular movements intact.   Cardiovascular:      Rate and Rhythm: Normal rate and regular rhythm.      Pulses: Normal pulses.      Heart sounds: Murmur heard. Holosystolic 2/6 murmur heard at LLSB     No friction rub. No gallop.   Pulmonary:      Effort: Pulmonary effort is normal. No respiratory distress.      Breath sounds: Normal breath sounds. No wheezing or rales.   Abdominal:      General: Bowel sounds are normal. There is no distension.      Palpations: Abdomen is soft.      Tenderness: There is no abdominal tenderness. There is no guarding.   Musculoskeletal:         General: Deformity present. R Leg injury. L leg avulsion on middle medial legNo swelling. Normal range of motion.      Cervical back: Normal range of motion. No rigidity or tenderness.   Skin:     General: Skin is warm.   Neurological:      General: No focal deficit present.      Mental Status: She is alert and oriented to person, place, and time.   Psychiatric:         Mood and Affect: Mood normal.         Behavior: Behavior normal.                Significant Labs: All pertinent labs within the past 24 hours have been reviewed.  CBC:   Recent Labs   Lab 09/23/24  0103   WBC 10.13   HGB 9.3*   HCT 27.1*   *     CMP:   Recent Labs   Lab 09/23/24  0103      K 3.1*      CO2 19*   *   BUN 10   CREATININE 1.0   CALCIUM 10.4   PROT 8.5*   ALBUMIN 3.9   BILITOT 0.8   ALKPHOS 86   AST 24   ALT 12   ANIONGAP 14     Magnesium: No results for input(s): "MG" in the last 48 hours.    Significant Imaging: I have reviewed all pertinent imaging results/findings within the past 24 " hours.  Assessment/Plan:     * Sickle-cell disease with pain  Nazanin Malone is a 44 y.o. female with a hx of HTN, PE, and sickle cell disease presenting to the ED for chest pain. Patient says that the chest pain started yesterday morning and she had no relief. She has be in and out of the hospital for sickle cell since June of this year.    Plan:   - keep patient hydrated with IV fluids  - use incentive spirometer to reduce incidence of acute chest syndrome  - continue VTE regimen  - take folic acid daily   - daily CBC  - benadryl prn for itching related to dilaudid        Constipation  Plan:  - patient should continue to take lactulose  - continue to take senna-docusate prn  - have patient ambulate as tolerated      Thrombosis of internal carotid, left  Plan:   - patient is in more hypercoagulable state  - hx of PE  - continue lovenox  - monitor for s/s of PE and DVT      Hypokalemia  Patient's most recent potassium results are listed below.   Recent Labs     09/23/24  0103   K 3.1*     Plan  - Replete potassium per protocol  - Monitor potassium Daily  - Patient's hypokalemia will be reevaluated after administration of potassium supplements    Morbid obesity  Body mass index is 44.08 kg/m². Morbid obesity complicates all aspects of disease management from diagnostic modalities to treatment. Weight loss encouraged and health benefits explained to patient.         Anxiety  Plan:   - denies thoughts of hurting self or others  - denies depression  - continue to take fluoxetine 40mg daily      Trigeminal neuralgia  Plan:   - continue gabapentin 400mg BID      Hypertension  Plan:   - patient hypertensive on admission  - continue to monitor  - continue amlodipine 10mg daily  - continue carvedilol 25mg BID      VTE Risk Mitigation (From admission, onward)           Ordered     enoxaparin injection 70 mg  Every 12 hours         09/23/24 0447     IP VTE HIGH RISK PATIENT  Once         09/23/24 0443     Place sequential  compression device  Until discontinued         09/23/24 0443                           On 09/23/2024, patient should be placed in hospital observation services under my care in collaboration with Team 5.         Fide Torres MD  PGY-1  Department of Hospital Medicine  [facility] - Emergency Dept

## 2024-09-23 NOTE — ASSESSMENT & PLAN NOTE
Plan:   - patient is in more hypercoagulable state  - hx of PE  - continue lovenox  - monitor for s/s of PE and DVT

## 2024-09-23 NOTE — ED TRIAGE NOTES
Pt arrives with her brother after feeling SOB and in a lot of pain that has progressed thru the day. States she fell recently and her L ankle is swollen. She feels like this is much more pain than a normal crisis.

## 2024-09-23 NOTE — ASSESSMENT & PLAN NOTE
Plan:   - denies thoughts of hurting self or others  - denies depression  - continue to take fluoxetine 40mg daily

## 2024-09-23 NOTE — ED NOTES
Patient identifiers for Nazanin Malone 44 y.o. female checked and correct.  Chief Complaint   Patient presents with    Chest Pain     Epigastric/Midsternal CP that started earlier today, does not radiate.     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 Denies ear drainage or hearing loss. No c/o nasal drainage. Denies dysphagia or voice changes.   Appearance: Pt awake, alert & oriented to person, place & time. Pt in no acute distress at present time. Pt is clean and well groomed with clothes appropriately fastened.   Skin: Skin warm, dry & intact. Color consistent with ethnicity. Mucous membranes moist. No breakdown or brusing noted.   Musculoskeletal: Patient moving all extremities well, no obvious swelling or deformities noted. Pt endorses generalized body aches and pain with a rating of a 10/10 on the pain scale.   Respiratory: Respirations spontaneous, even, and non-labored. Visible chest rise noted. Airway is open and patent. No accessory muscle use noted. Pt endorses some shortness of breath associated with chest pain. Pt is currently on room air with oxygen saturation readings of 98%.   Neurologic: Sensation is intact. Speech is clear and appropriate. Eyes open spontaneously, behavior appropriate to situation, follows commands, facial expression symmetrical, bilateral hand grasp equal and  even, purposeful motor response noted.  Cardiac: All peripheral pulses present. No Bilateral lower extremity edema. Cap refill is <3 seconds. Pt endorses chest wall pain with a rating of a 10/10 on the pain scale. Pt describes the pain a dull ache.   Abdomen: Abdomen soft, non distended, non tender to palpation.   : Pt voids independently, denies dysuria, hematuria, frequency.

## 2024-09-23 NOTE — ASSESSMENT & PLAN NOTE
Plan:   - patient hypertensive on admission  - continue to monitor  - continue amlodipine 10mg daily  - continue carvedilol 25mg BID

## 2024-09-23 NOTE — ASSESSMENT & PLAN NOTE
Body mass index is 44.08 kg/m². Morbid obesity complicates all aspects of disease management from diagnostic modalities to treatment. Weight loss encouraged and health benefits explained to patient.        Hospital Medicine   Progress note   Team: [facility] [place] [facility] [place] Letty Todd MD   Admit Date: 2/4/2019   COLEEN 2/9/2019   Code status: Full Code   Principal Problem: Vascular complication     Interval hx: No events overnight.  Leg still warm this am.  Bridging coumadin, INR at 1 today.  No CP or SOB.      ROS   Constitutional: Negative for chills, fatigue, fever.   HENT: Negative for sore throat, trouble swallowing.    Eyes: Negative for photophobia, visual disturbance.   Respiratory: Negative for cough, shortness of breath.    Cardiovascular: Negative for chest pain, palpitations, leg swelling.   Gastrointestinal: Negative for abdominal pain, constipation, diarrhea, nausea, vomiting.   Endocrine: Negative for cold intolerance, heat intolerance.   Genitourinary: Negative for dysuria, frequency.   Musculoskeletal: + arthralgias, myalgias (RLE)  Skin: Negative for rash, erythema , +skin irritation in RLE  Neurological: Negative for dizziness, syncope, weakness, light-headedness.   Psychiatric/Behavioral: Negative for confusion, hallucinations, anxiety  All other systems reviewed and are negative.    PEx   Temp:  [95.9 °F (35.5 °C)-97.9 °F (36.6 °C)]   Pulse:  [79-99]   Resp:  [15-18]   BP: (134-146)/(83-89)   SpO2:  [97 %-98 %]      I & O (Last 24H):     Intake/Output Summary (Last 24 hours) at 2/7/2019 0853  Last data filed at 2/6/2019 1732  Gross per 24 hour   Intake 650 ml   Output --   Net 650 ml       General appearance: no distress, alert and oriented x 3  HEENT:  conjunctivae/corneas clear, PERRL, mucous membranes moist   Neck: supple, thyroid not enlarged  Pulm:   normal respiratory effort, CTAB, no wheezes, rales or rhonchi  Card: RRR, S1, S2 normal, no murmur, click, rub or gallop  Abd: soft, NT, ND, BS present; no masses, no organomegaly  Ext: R BKA with warmth noted and mild erythema, still less warm than LLE but improving, mild skin irritation at the base around the site where prosthesis attaches  Pulses: 2+,  symmetric  Skin: color, texture, turgor normal. No rashes or lesions  Neuro: CN II-XII grossly intact, no focal numbness or weakness, normal strength and tone   Psych: normal mood and affect      Recent Results (from the past 24 hour(s))   APTT    Collection Time: 02/06/19  3:20 PM   Result Value Ref Range    aPTT 40.1 (H) 21.0 - 32.0 sec   APTT    Collection Time: 02/06/19 11:38 PM   Result Value Ref Range    aPTT 41.5 (H) 21.0 - 32.0 sec   Comprehensive Metabolic Panel (CMP)    Collection Time: 02/07/19  5:28 AM   Result Value Ref Range    Sodium 139 136 - 145 mmol/L    Potassium 4.1 3.5 - 5.1 mmol/L    Chloride 104 95 - 110 mmol/L    CO2 26 23 - 29 mmol/L    Glucose 89 70 - 110 mg/dL    BUN, Bld 13 6 - 20 mg/dL    Creatinine 0.8 0.5 - 1.4 mg/dL    Calcium 9.5 8.7 - 10.5 mg/dL    Total Protein 7.3 6.0 - 8.4 g/dL    Albumin 3.9 3.5 - 5.2 g/dL    Total Bilirubin 0.5 0.1 - 1.0 mg/dL    Alkaline Phosphatase 85 55 - 135 U/L    AST 21 10 - 40 U/L    ALT 12 10 - 44 U/L    Anion Gap 9 8 - 16 mmol/L    eGFR if African American >60.0 >60 mL/min/1.73 m^2    eGFR if non African American >60.0 >60 mL/min/1.73 m^2   CBC with Automated Differential    Collection Time: 02/07/19  5:28 AM   Result Value Ref Range    WBC 6.94 3.90 - 12.70 K/uL    RBC 4.99 4.60 - 6.20 M/uL    Hemoglobin 15.2 14.0 - 18.0 g/dL    Hematocrit 45.7 40.0 - 54.0 %    MCV 92 82 - 98 fL    MCH 30.5 27.0 - 31.0 pg    MCHC 33.3 32.0 - 36.0 g/dL    RDW 12.7 11.5 - 14.5 %    Platelets 245 150 - 350 K/uL    MPV 10.6 9.2 - 12.9 fL    Immature Granulocytes 0.3 0.0 - 0.5 %    Gran # (ANC) 3.3 1.8 - 7.7 K/uL    Immature Grans (Abs) 0.02 0.00 - 0.04 K/uL    Lymph # 2.9 1.0 - 4.8 K/uL    Mono # 0.4 0.3 - 1.0 K/uL    Eos # 0.3 0.0 - 0.5 K/uL    Baso # 0.07 0.00 - 0.20 K/uL    nRBC 0 0 /100 WBC    Gran% 47.3 38.0 - 73.0 %    Lymph% 41.4 18.0 - 48.0 %    Mono% 6.3 4.0 - 15.0 %    Eosinophil% 3.7 0.0 - 8.0 %    Basophil% 1.0 0.0 - 1.9 %    Aniso Slight     Poik Slight      Poly Occasional     Hypo Occasional     Ovalocytes Occasional     Target Cells Occasional     Differential Method Automated    APTT    Collection Time: 02/07/19  5:28 AM   Result Value Ref Range    aPTT 42.3 (H) 21.0 - 32.0 sec   Protime-INR    Collection Time: 02/07/19  5:28 AM   Result Value Ref Range    Prothrombin Time 10.1 9.0 - 12.5 sec    INR 1.0 0.8 - 1.2       No results for input(s): POCTGLUCOSE in the last 168 hours.    Hemoglobin A1C   Date Value Ref Range Status   02/05/2019 5.1 4.0 - 5.6 % Final     Comment:     ADA Screening Guidelines:  5.7-6.4%  Consistent with prediabetes  >or=6.5%  Consistent with diabetes  High levels of fetal hemoglobin interfere with the HbA1C  assay. Heterozygous hemoglobin variants (HbS, HgC, etc)do  not significantly interfere with this assay.   However, presence of multiple variants may affect accuracy.     12/12/2015 5.3 4.5 - 6.2 % Final        Active Hospital Problems    Diagnosis  POA    *Vascular complication [I99.9]  Yes    BKA stump complication [T87.9]  Yes    Ecchymosis [R58]  Yes    Tobacco abuse [Z72.0]  Yes     Chronic    Pain in right lower leg [M79.661]  Yes    HLD (hyperlipidemia) [E78.5]  Yes     Chronic    Essential hypertension [I10]  Yes     Chronic    Anxiety and depression [F41.9, F32.9]  Yes     Chronic      Resolved Hospital Problems   No resolved problems to display.         amLODIPine  10 mg Oral Daily    aspirin  81 mg Oral Daily    atorvastatin  40 mg Oral Daily    DULoxetine  60 mg Oral Daily    gabapentin  600 mg Oral TID    hydroCHLOROthiazide  12.5 mg Oral Daily    lisinopril  20 mg Oral Daily    naproxen  500 mg Oral BID    nicotine  1 patch Transdermal Daily    senna-docusate 8.6-50 mg  1 tablet Oral BID    warfarin  7.5 mg Oral Daily     acetaminophen, albuterol-ipratropium, ALPRAZolam, dextrose 50%, dextrose 50%, glucagon (human recombinant), glucose, glucose, heparin (PORCINE), heparin (PORCINE), morphine, ondansetron,  oxyCODONE, promethazine, ramelteon, sodium chloride 0.9%, sodium chloride 0.9%      Assessment and Plan for problems addressed today:   1. Vascular complication with history of right below knee amputation  - ESR 43, CRP 8.7, .  - UA RLE arteries and veins unremarkable.  - CTA RLE showed extensive atherosclerosis resulting in focal narrowing at the proximal aspect of the right popliteal artery likely representing 50% stenosis.  No vascular flow visualized beyond the proximal anterior tibial artery; uncertain if this represents normal postoperative vascular morphology given lack of prior imaging, good flow per vascular sx review  - Appreciate vascular surgery's assistance. No acute surgical intervention planned  - continue heparin drip until INR therapeutic, started coumadin 7.5mg  - PCP will manage coumadin per patient   - consult pharm to assist with coumadin dosing       2. Tobacco abuse  - Nicoderm patch  - Counseled on cessation      3. Anxiety and depression  - Continue Cymbalta 60mg daily and PRN Xanax 0.5mg TID PRN.     4. HLD (hyperlipidemia)  - Continue Lipitor 40mg daily.     5. Essential hypertension  - Continue amlodipine 10mg daily, HCTZ 12.5mg daily, lisinopril 20mg daily.           Diet: cardiac  DVT PPx: heparin   Code status: FULL     Discharge plan and follow up:  Continue heparin drip and coumadin, awaiting until therpeutic, vascular has no acute surgical plan as of now, leg is warm on exam today       Letty Todd MD  Hospital Medicine Staff  [phone] pager

## 2024-09-23 NOTE — ED PROVIDER NOTES
Encounter Date: 2024       History     Chief Complaint   Patient presents with    Chest Pain     Epigastric/Midsternal CP that started earlier today, does not radiate.     44-year-old female with a past medical history of HTN, morbid obesity, anxiety, depression, asthma, sickle cell-beta thalassemia, PE, and carotid thrombosis on lovenox with recent admission and discharge 4 days prior for sickle cell pain crisis in setting of small L medial malleolus avulsion now presents with chief complaint of chest pain.  Patient now presents with a chief complaint of chest pain.  Patient reports that her chest pain has progressively worsened throughout the course of the day and is sharp and substernal but does not radiate.  Patient reports that she also has pain in her bilateral hips consistent with sickle cell pain however she endorses that the pain in her chest feels different than previous sickle cell pain crisis.  Patient denies any hemoptysis.      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    Asthma     Depression     Hypertension     Morbid obesity     Opioid dependence 2017    Pneumonia due to Streptococcus pneumoniae 2017    Right lower lobe pneumonia 2017    Sepsis due to Streptococcus pneumoniae 2017    Sickle cell-beta thalassemia disease with pain     Trigeminal neuralgia      Past Surgical History:   Procedure Laterality Date     SECTION      TONSILLECTOMY      TUBAL LIGATION       Family History   Problem Relation Name Age of Onset    Heart disease Mother      Diabetes Mother      Heart disease Father      Breast cancer Neg Hx      Ovarian cancer Neg Hx      Colon cancer Neg Hx       Social History     Tobacco Use    Smoking status:  Never    Smokeless tobacco: Never   Substance Use Topics    Alcohol use: No    Drug use: Yes     Types: Marijuana     Comment: periodically      Review of Systems  See HPI     Physical Exam     Initial Vitals [09/22/24 2329]   BP Pulse Resp Temp SpO2   (!) 144/89 103 20 98.6 °F (37 °C) 100 %      MAP       --         Physical Exam    Nursing note and vitals reviewed.      Gen: AxOx3, well nourished, appears stated age, no pallor, no jaundice, appears well hydrated  Eye: EOMI, no scleral icterus, no periorbital edema or ecchymosis  Head: Normocephalic, atraumatic, no lesions, scalp appears normal  ENT: Neck supple, no stridor, no masses, no drooling or voice changes  CVS:  Reproducible chest wall tenderness over sternum.  All distal pulses intact with rapid rate and normal rhythm, no JVD, normal S1/S2, no murmur  Pulm: Normal breath sounds, no wheezes, rales or rhonchi, no increased work of breathing  Abd:  Nondistended, soft, nontender, no organomegaly, no CVAT  Ext: No edema, no lesions, rashes, or deformity  Neuro: GCS15, moving all extremities, gait intact, face grossly symmetric  Psych: normal affect, cooperative, well groomed, makes good eye contact      ED Course   Procedures  Labs Reviewed   CBC W/ AUTO DIFFERENTIAL - Abnormal       Result Value    WBC 10.13      RBC 3.81 (*)     Hemoglobin 9.3 (*)     Hematocrit 27.1 (*)     MCV 71 (*)     MCH 24.4 (*)     MCHC 34.3      RDW 19.0 (*)     Platelets 799 (*)     MPV 9.6      Immature Granulocytes 0.4      Gran # (ANC) 7.8 (*)     Immature Grans (Abs) 0.04      Lymph # 1.5      Mono # 0.8      Eos # 0.0      Baso # 0.04      nRBC 2 (*)     Gran % 77.1 (*)     Lymph % 14.5 (*)     Mono % 7.6      Eosinophil % 0.0      Basophil % 0.4      Differential Method Automated     COMPREHENSIVE METABOLIC PANEL - Abnormal    Sodium 138      Potassium 3.1 (*)     Chloride 105      CO2 19 (*)     Glucose 116 (*)     BUN 10      Creatinine 1.0      Calcium 10.4      Total  Protein 8.5 (*)     Albumin 3.9      Total Bilirubin 0.8      Alkaline Phosphatase 86      AST 24      ALT 12      eGFR >60.0      Anion Gap 14     D DIMER, QUANTITATIVE - Abnormal    D-Dimer 1.48 (*)    RETICULOCYTES    Retic 1.6     TROPONIN I    Troponin I 0.006     URINALYSIS, REFLEX TO URINE CULTURE     EKG Readings: (Independently Interpreted)   Initial Reading: No STEMI.   Sinus tachycardia with rate 104 without ST depressions or elevations.       Imaging Results              X-Ray Chest AP Portable (Final result)  Result time 09/23/24 01:01:38      Final result by Tarik Mata MD (09/23/24 01:01:38)                   Impression:      No acute findings.    No significant change from prior study.      Electronically signed by: Tarik Mata MD  Date:    09/23/2024  Time:    01:01               Narrative:    EXAMINATION:  XR CHEST AP PORTABLE    CLINICAL HISTORY:  chest pain;    TECHNIQUE:  Single frontal view of the chest was performed.    COMPARISON:  09/13/2024.    FINDINGS:  Right-sided port catheter tip overlies the upper right atrium, similar to prior.    Heart and lungs  appear unchanged when allowing for differences in technique and positioning.                                       Medications   HYDROmorphone injection 3 mg (3 mg Intravenous Given 9/23/24 0054)   lactated ringers bolus 1,000 mL (0 mLs Intravenous Stopped 9/23/24 0130)   ondansetron injection 4 mg (4 mg Intravenous Given 9/23/24 0100)   prochlorperazine injection Soln 10 mg (10 mg Intravenous Given 9/23/24 0308)   potassium chloride 10 mEq in 100 mL IVPB (10 mEq Intravenous New Bag 9/23/24 0325)   HYDROmorphone injection 2 mg (2 mg Intravenous Given 9/23/24 0308)     Medical Decision Making  Initial assessment  44-year-old female with a past medical history of HTN, morbid obesity, anxiety, depression, asthma, sickle cell-beta thalassemia, PE, and carotid thrombosis on lovenox with recent admission and discharge 4 days prior for  sickle cell pain crisis in setting of small L medial malleolus avulsion now presents with chief complaint of chest pain. Patient is able to vocalise, breathing spontaneously, hemodynamically stable, oriented, moving all 4 limbs spontaneously.  Examination consistent with chest wall tenderness and mild tachycardia.      Differential diagnosis includes life-threatening emergencies including  Sickle cell pain crisis  Acute chest syndrome  PE  ACS  Pneumonia      ED management  Patient was very uncomfortable appearing decided to give IV fluids, Dilaudid, and workup for above-mentioned differential.  Workup consistent with CBC showing stable HB without leukocytosis.  CMP consistent with mild hypokalemia which was repleted with stable renal function.  Troponin negative.  D-dimer 1.48 which is patient's baseline and she is on Lovenox.  Reticulocyte normal and not consistent with aplastic anemia.  Chest x-ray without signs concerning for acute chest.  EKG without ischemia or infarction.  Patient required multiple doses of Dilaudid and patient will require admission for management of sickle cell pain crisis.  Patient was discussed with Hospital Medicine who agreed to admit patient to their service for pain management of sickle cell crisis.    Amount and/or Complexity of Data Reviewed  Labs: ordered. Decision-making details documented in ED Course.  Radiology: ordered. Decision-making details documented in ED Course.    Risk  Prescription drug management.               ED Course as of 09/23/24 0426   Mon Sep 23, 2024   0002 BP(!): 144/89 [PM]   0002 Temp: 98.6 °F (37 °C) [PM]   0002 Pulse: 103 [PM]   0211 WBC: 10.13 [PM]   0212 Hemoglobin(!): 9.3  Improved from baseline [PM]   0212 Platelet Count(!): 799 [PM]   0212 D-Dimer(!): 1.48  Grossly patient's baseline and likely in setting of known thrombus.  Patient on Lovenox [PM]   0212 Sodium: 138 [PM]   0212 Potassium(!): 3.1 [PM]   0212 BUN: 10 [PM]   0212 Creatinine: 1.0 [PM]    0213 X-Ray Chest AP Portable  As per my independent interpretation no infiltrates concerning for acute chest or pneumonia [PM]   0249 Troponin I: 0.006 [PM]      ED Course User Index  [PM] Miriam Guillermo MD                         Clinical Impression:  Final diagnoses:  [R07.9] Chest pain  [E87.6] Hypokalemia (Primary)  [D57.00] Sickle cell pain crisis          ED Disposition Condition    Observation Stable                Miriam Guillermo MD  Resident  09/23/24 [id number]

## 2024-09-23 NOTE — ED NOTES
Pt states she is currently feeling nauseated. There are no current medication ordered for the pt in the MAR. Notified team to inform them of current symptoms for the pt.

## 2024-09-24 LAB
ALBUMIN SERPL BCP-MCNC: 3.3 G/DL (ref 3.5–5.2)
ALP SERPL-CCNC: 65 U/L (ref 55–135)
ALT SERPL W/O P-5'-P-CCNC: 15 U/L (ref 10–44)
ANION GAP SERPL CALC-SCNC: 9 MMOL/L (ref 8–16)
AST SERPL-CCNC: 27 U/L (ref 10–40)
BASOPHILS # BLD AUTO: 0.08 K/UL (ref 0–0.2)
BASOPHILS NFR BLD: 1 % (ref 0–1.9)
BILIRUB SERPL-MCNC: 0.6 MG/DL (ref 0.1–1)
BUN SERPL-MCNC: 13 MG/DL (ref 6–20)
CALCIUM SERPL-MCNC: 9.1 MG/DL (ref 8.7–10.5)
CHLORIDE SERPL-SCNC: 107 MMOL/L (ref 95–110)
CO2 SERPL-SCNC: 23 MMOL/L (ref 23–29)
CREAT SERPL-MCNC: 1 MG/DL (ref 0.5–1.4)
DIFFERENTIAL METHOD BLD: ABNORMAL
EOSINOPHIL # BLD AUTO: 0.2 K/UL (ref 0–0.5)
EOSINOPHIL NFR BLD: 2.4 % (ref 0–8)
ERYTHROCYTE [DISTWIDTH] IN BLOOD BY AUTOMATED COUNT: 19.7 % (ref 11.5–14.5)
EST. GFR  (NO RACE VARIABLE): >60 ML/MIN/1.73 M^2
FACT X PPP CHRO-ACNC: 0.69 IU/ML (ref 0.3–0.7)
GLUCOSE SERPL-MCNC: 72 MG/DL (ref 70–110)
HCT VFR BLD AUTO: 25.7 % (ref 37–48.5)
HGB BLD-MCNC: 8.2 G/DL (ref 12–16)
IMM GRANULOCYTES # BLD AUTO: 0.02 K/UL (ref 0–0.04)
IMM GRANULOCYTES NFR BLD AUTO: 0.3 % (ref 0–0.5)
LYMPHOCYTES # BLD AUTO: 3.2 K/UL (ref 1–4.8)
LYMPHOCYTES NFR BLD: 41.2 % (ref 18–48)
MAGNESIUM SERPL-MCNC: 2.1 MG/DL (ref 1.6–2.6)
MCH RBC QN AUTO: 23.6 PG (ref 27–31)
MCHC RBC AUTO-ENTMCNC: 31.9 G/DL (ref 32–36)
MCV RBC AUTO: 74 FL (ref 82–98)
MONOCYTES # BLD AUTO: 1.2 K/UL (ref 0.3–1)
MONOCYTES NFR BLD: 14.9 % (ref 4–15)
NEUTROPHILS # BLD AUTO: 3.2 K/UL (ref 1.8–7.7)
NEUTROPHILS NFR BLD: 40.2 % (ref 38–73)
NRBC BLD-RTO: 1 /100 WBC
PHOSPHATE SERPL-MCNC: 3.8 MG/DL (ref 2.7–4.5)
PLATELET # BLD AUTO: 674 K/UL (ref 150–450)
PMV BLD AUTO: 9.8 FL (ref 9.2–12.9)
POTASSIUM SERPL-SCNC: 3.4 MMOL/L (ref 3.5–5.1)
PROT SERPL-MCNC: 6.9 G/DL (ref 6–8.4)
RBC # BLD AUTO: 3.47 M/UL (ref 4–5.4)
SODIUM SERPL-SCNC: 139 MMOL/L (ref 136–145)
WBC # BLD AUTO: 7.86 K/UL (ref 3.9–12.7)

## 2024-09-24 PROCEDURE — 80053 COMPREHEN METABOLIC PANEL: CPT

## 2024-09-24 PROCEDURE — 25000003 PHARM REV CODE 250

## 2024-09-24 PROCEDURE — 63600175 PHARM REV CODE 636 W HCPCS

## 2024-09-24 PROCEDURE — 83735 ASSAY OF MAGNESIUM: CPT

## 2024-09-24 PROCEDURE — S5010 5% DEXTROSE AND 0.45% SALINE: HCPCS

## 2024-09-24 PROCEDURE — 85025 COMPLETE CBC W/AUTO DIFF WBC: CPT

## 2024-09-24 PROCEDURE — 84100 ASSAY OF PHOSPHORUS: CPT

## 2024-09-24 PROCEDURE — 25000003 PHARM REV CODE 250: Performed by: INTERNAL MEDICINE

## 2024-09-24 PROCEDURE — 36415 COLL VENOUS BLD VENIPUNCTURE: CPT

## 2024-09-24 PROCEDURE — 85520 HEPARIN ASSAY: CPT | Performed by: INTERNAL MEDICINE

## 2024-09-24 PROCEDURE — 21400001 HC TELEMETRY ROOM

## 2024-09-24 PROCEDURE — 25000242 PHARM REV CODE 250 ALT 637 W/ HCPCS

## 2024-09-24 RX ORDER — OXYCODONE HYDROCHLORIDE 10 MG/1
10 TABLET ORAL EVERY 4 HOURS PRN
Status: DISCONTINUED | OUTPATIENT
Start: 2024-09-24 | End: 2024-09-24

## 2024-09-24 RX ORDER — MORPHINE SULFATE 15 MG/1
15 TABLET, FILM COATED, EXTENDED RELEASE ORAL EVERY 12 HOURS
Status: DISCONTINUED | OUTPATIENT
Start: 2024-09-24 | End: 2024-09-26

## 2024-09-24 RX ORDER — MORPHINE SULFATE 15 MG/1
15 TABLET, FILM COATED, EXTENDED RELEASE ORAL EVERY 12 HOURS
Status: DISCONTINUED | OUTPATIENT
Start: 2024-09-24 | End: 2024-09-24

## 2024-09-24 RX ORDER — OXYCODONE HYDROCHLORIDE 5 MG/1
5 TABLET ORAL EVERY 6 HOURS PRN
Status: DISCONTINUED | OUTPATIENT
Start: 2024-09-24 | End: 2024-09-24

## 2024-09-24 RX ORDER — HYDROMORPHONE HYDROCHLORIDE 1 MG/ML
1 INJECTION, SOLUTION INTRAMUSCULAR; INTRAVENOUS; SUBCUTANEOUS EVERY 4 HOURS PRN
Status: DISCONTINUED | OUTPATIENT
Start: 2024-09-24 | End: 2024-09-25

## 2024-09-24 RX ORDER — POTASSIUM CHLORIDE 20 MEQ/1
40 TABLET, EXTENDED RELEASE ORAL ONCE
Status: COMPLETED | OUTPATIENT
Start: 2024-09-24 | End: 2024-09-24

## 2024-09-24 RX ORDER — MORPHINE SULFATE 15 MG/1
15 TABLET, FILM COATED, EXTENDED RELEASE ORAL EVERY 12 HOURS PRN
Status: DISCONTINUED | OUTPATIENT
Start: 2024-09-24 | End: 2024-09-24

## 2024-09-24 RX ORDER — PROMETHAZINE HYDROCHLORIDE 12.5 MG/1
12.5 TABLET ORAL EVERY 8 HOURS PRN
Status: DISCONTINUED | OUTPATIENT
Start: 2024-09-24 | End: 2024-10-02 | Stop reason: HOSPADM

## 2024-09-24 RX ORDER — GABAPENTIN 300 MG/1
300 CAPSULE ORAL 3 TIMES DAILY
Status: DISCONTINUED | OUTPATIENT
Start: 2024-09-24 | End: 2024-10-02 | Stop reason: HOSPADM

## 2024-09-24 RX ORDER — HYDROMORPHONE HYDROCHLORIDE 1 MG/ML
2 INJECTION, SOLUTION INTRAMUSCULAR; INTRAVENOUS; SUBCUTANEOUS ONCE
Status: DISCONTINUED | OUTPATIENT
Start: 2024-09-24 | End: 2024-09-24

## 2024-09-24 RX ORDER — OXYCODONE HYDROCHLORIDE 5 MG/1
5 TABLET ORAL EVERY 6 HOURS PRN
Status: DISCONTINUED | OUTPATIENT
Start: 2024-09-24 | End: 2024-09-25

## 2024-09-24 RX ORDER — OXYCODONE HYDROCHLORIDE 5 MG/1
5 TABLET ORAL EVERY 6 HOURS
Status: DISCONTINUED | OUTPATIENT
Start: 2024-09-24 | End: 2024-09-24

## 2024-09-24 RX ORDER — OXYCODONE HYDROCHLORIDE 10 MG/1
10 TABLET ORAL EVERY 4 HOURS PRN
Status: DISCONTINUED | OUTPATIENT
Start: 2024-09-24 | End: 2024-09-25

## 2024-09-24 RX ORDER — HYDROMORPHONE HYDROCHLORIDE 1 MG/ML
1 INJECTION, SOLUTION INTRAMUSCULAR; INTRAVENOUS; SUBCUTANEOUS ONCE AS NEEDED
Status: COMPLETED | OUTPATIENT
Start: 2024-09-24 | End: 2024-09-24

## 2024-09-24 RX ORDER — HYDROMORPHONE HYDROCHLORIDE 1 MG/ML
1 INJECTION, SOLUTION INTRAMUSCULAR; INTRAVENOUS; SUBCUTANEOUS ONCE
Status: DISCONTINUED | OUTPATIENT
Start: 2024-09-24 | End: 2024-09-24

## 2024-09-24 RX ADMIN — OXYCODONE HYDROCHLORIDE 10 MG: 10 TABLET ORAL at 02:09

## 2024-09-24 RX ADMIN — HYDROMORPHONE HYDROCHLORIDE 1 MG: 1 INJECTION, SOLUTION INTRAMUSCULAR; INTRAVENOUS; SUBCUTANEOUS at 01:09

## 2024-09-24 RX ADMIN — HYDROMORPHONE HYDROCHLORIDE 2 MG: 1 INJECTION, SOLUTION INTRAMUSCULAR; INTRAVENOUS; SUBCUTANEOUS at 07:09

## 2024-09-24 RX ADMIN — HYDROMORPHONE HYDROCHLORIDE 1 MG: 1 INJECTION, SOLUTION INTRAMUSCULAR; INTRAVENOUS; SUBCUTANEOUS at 09:09

## 2024-09-24 RX ADMIN — METHOCARBAMOL 750 MG: 750 TABLET ORAL at 12:09

## 2024-09-24 RX ADMIN — FLUTICASONE PROPIONATE 100 MCG: 50 SPRAY, METERED NASAL at 08:09

## 2024-09-24 RX ADMIN — POTASSIUM CHLORIDE 40 MEQ: 1500 TABLET, EXTENDED RELEASE ORAL at 07:09

## 2024-09-24 RX ADMIN — ENOXAPARIN SODIUM 70 MG: 80 INJECTION SUBCUTANEOUS at 08:09

## 2024-09-24 RX ADMIN — GABAPENTIN 300 MG: 300 CAPSULE ORAL at 08:09

## 2024-09-24 RX ADMIN — DEXTROSE AND SODIUM CHLORIDE: 5; 450 INJECTION, SOLUTION INTRAVENOUS at 04:09

## 2024-09-24 RX ADMIN — HYDROMORPHONE HYDROCHLORIDE 1 MG: 1 INJECTION, SOLUTION INTRAMUSCULAR; INTRAVENOUS; SUBCUTANEOUS at 04:09

## 2024-09-24 RX ADMIN — FOLIC ACID 1 MG: 1 TABLET ORAL at 07:09

## 2024-09-24 RX ADMIN — ACETAMINOPHEN 1000 MG: 500 TABLET ORAL at 01:09

## 2024-09-24 RX ADMIN — OXYCODONE HYDROCHLORIDE 15 MG: 10 TABLET ORAL at 05:09

## 2024-09-24 RX ADMIN — HYDROMORPHONE HYDROCHLORIDE 2 MG: 1 INJECTION, SOLUTION INTRAMUSCULAR; INTRAVENOUS; SUBCUTANEOUS at 12:09

## 2024-09-24 RX ADMIN — METHOCARBAMOL 750 MG: 750 TABLET ORAL at 08:09

## 2024-09-24 RX ADMIN — FLUOXETINE HYDROCHLORIDE 40 MG: 20 CAPSULE ORAL at 07:09

## 2024-09-24 RX ADMIN — SENNOSIDES AND DOCUSATE SODIUM 1 TABLET: 50; 8.6 TABLET ORAL at 07:09

## 2024-09-24 RX ADMIN — MORPHINE SULFATE 15 MG: 15 TABLET, EXTENDED RELEASE ORAL at 08:09

## 2024-09-24 RX ADMIN — OXYCODONE HYDROCHLORIDE 10 MG: 10 TABLET ORAL at 11:09

## 2024-09-24 RX ADMIN — METHOCARBAMOL 750 MG: 750 TABLET ORAL at 05:09

## 2024-09-24 RX ADMIN — CARVEDILOL 25 MG: 25 TABLET, FILM COATED ORAL at 09:09

## 2024-09-24 RX ADMIN — GABAPENTIN 300 MG: 300 CAPSULE ORAL at 02:09

## 2024-09-24 RX ADMIN — METHOCARBAMOL 750 MG: 750 TABLET ORAL at 09:09

## 2024-09-24 RX ADMIN — ACETAMINOPHEN 1000 MG: 500 TABLET ORAL at 08:09

## 2024-09-24 RX ADMIN — GABAPENTIN 400 MG: 300 CAPSULE ORAL at 08:09

## 2024-09-24 RX ADMIN — CARVEDILOL 25 MG: 25 TABLET, FILM COATED ORAL at 08:09

## 2024-09-24 RX ADMIN — DEXTROSE AND SODIUM CHLORIDE: 5; 450 INJECTION, SOLUTION INTRAVENOUS at 05:09

## 2024-09-24 RX ADMIN — OXYCODONE HYDROCHLORIDE 15 MG: 10 TABLET ORAL at 09:09

## 2024-09-24 RX ADMIN — AMLODIPINE BESYLATE 10 MG: 10 TABLET ORAL at 09:09

## 2024-09-24 RX ADMIN — PROCHLORPERAZINE MALEATE 5 MG: 5 TABLET ORAL at 12:09

## 2024-09-24 NOTE — NURSING
Nurses Note -- 4 Eyes      9/23/2024   7:31 PM      Skin assessed during: Admit      [x] No Altered Skin Integrity Present    []Prevention Measures Documented      [] Yes- Altered Skin Integrity Present or Discovered   [] LDA Added if Not in Epic (Describe Wound)   [] New Altered Skin Integrity was Present on Admit and Documented in LDA   [] Wound Image Taken    Wound Care Consulted? No    Attending Nurse:  Rahel Damian RN/Staff Member:   [place]

## 2024-09-24 NOTE — ASSESSMENT & PLAN NOTE
Nazanin Malone is a 44 y.o. female with a hx of HTN, PE, and sickle cell disease presenting to the ED for chest pain. Patient says that the chest pain started yesterday morning and she had no relief. She has be in and out of the hospital for sickle cell since June of this year. At the moment with breakthrough pain.     Plan:   -Multimodal pain management: IV dilaudid 1q4h. [id number] and [id number] PO. PRN  Mscontin 15 BID. Scheduled. Gabapentin 300 TID. Tylenol 1gr q8h  - keep patient hydrated with IV fluids  - use incentive spirometer to reduce incidence of acute chest syndrome  - continue VTE regimen  - take folic acid daily   - daily CBC  - benadryl prn for itching related to dilaudid

## 2024-09-24 NOTE — NURSING
I assumed care of this patient at this time. Report received by RN Lakeisha. Patient arrived to the unit via stretcher. The patient is alert and oriented to person, place, time and situation. The bed is in low position, locked, with two side rails up, and the call light within reach.

## 2024-09-24 NOTE — ASSESSMENT & PLAN NOTE
Nazanin Malone is a 44 y.o. female with a hx of HTN, PE, and sickle cell disease presenting to the ED for chest pain. Patient says that the chest pain started yesterday morning and she had no relief. She has be in and out of the hospital for sickle cell since June of this year. At the moment with breakthrough pain.     Plan:   -Multimodal pain management: IV dilaudid 1q4h. [id number] and 5q6hrs PO. PRN  Mscontin 15 BID. Scheduled. Gabapentin 300 TID. Tylenol 1gr q8h  - keep patient hydrated with IV fluids  - use incentive spirometer to reduce incidence of acute chest syndrome  - continue VTE regimen  - take folic acid daily   - daily CBC  - benadryl prn for itching related to dilaudid

## 2024-09-24 NOTE — SUBJECTIVE & OBJECTIVE
Interval History: Patient at bedside complaining of ongoing pain. She refers her pain medicine doesn't cover her for enough time and that she is having breakthrough pain. No episodes of nausea or vomiting. Refers she has had anorexia. Had BM yesterday. Increased gabapentin to TID. Adjusted opioid mgmt.     Review of Systems  Constitutional:  Positive for chills, diaphoresis, fatigue and fever.   HENT:  Positive for ear pain. Negative for sore throat and trouble swallowing.    Eyes:  Negative for photophobia, pain and redness.   Respiratory:  Positive for chest tightness and shortness of breath. Negative for cough.    Cardiovascular:  Positive for chest pain and palpitations.   Gastrointestinal:  Positive for nausea and vomiting. Negative for blood in stool, constipation and diarrhea.   Genitourinary:  Negative for hematuria.   Musculoskeletal:  Negative for back pain, joint swelling, neck pain and neck stiffness.   Neurological:  Positive for seizures. Negative for dizziness, tremors, syncope, light-headedness and headaches.   Psychiatric/Behavioral:  Negative for agitation, confusion and hallucinations.    Objective:     Vital Signs (Most Recent):  Temp: 99.1 °F (37.3 °C) (09/24/24 1135)  Pulse: 72 (09/24/24 1135)  Resp: 18 (09/24/24 1327)  BP: (!) 143/89 (09/24/24 1135)  SpO2: 98 % (09/24/24 1135) Vital Signs (24h Range):  Temp:  [98.7 °F (37.1 °C)-99.6 °F (37.6 °C)] 99.1 °F (37.3 °C)  Pulse:  [72-82] 72  Resp:  [16-20] 18  SpO2:  [95 %-100 %] 98 %  BP: (132-156)/(84-95) 143/89     Weight: 104.1 kg (229 lb 8 oz)  Body mass index is 41.98 kg/m².    Intake/Output Summary (Last 24 hours) at 9/24/2024 1435  Last data filed at 9/24/2024 0516  Gross per 24 hour   Intake 1240 ml   Output --   Net 1240 ml         Physical Exam      Constitutional:       General: She is not in acute distress.     Appearance: Normal appearance. She is not ill-appearing.   HENT:      Head: Normocephalic.      Nose: Nose normal.   Eyes:       Extraocular Movements: Extraocular movements intact.   Cardiovascular:      Rate and Rhythm: Normal rate and regular rhythm.      Pulses: Normal pulses.      Heart sounds: Murmur heard. Holosystolic 2/6 murmur heard at LLSB     No friction rub. No gallop.   Pulmonary:      Effort: Pulmonary effort is normal. No respiratory distress.      Breath sounds: Normal breath sounds. No wheezing or rales.   Abdominal:      General: Bowel sounds are normal. There is no distension.      Palpations: Abdomen is soft.      Tenderness: There is no abdominal tenderness. There is no guarding.   Musculoskeletal:         General: Deformity present. R Leg injury. L leg avulsion on middle medial legNo swelling. Normal range of motion.      Cervical back: Normal range of motion. No rigidity or tenderness.   Skin:     General: Skin is warm.   Neurological:      General: No focal deficit present.      Mental Status: She is alert and oriented to person, place, and time.   Psychiatric:         Mood and Affect: Mood normal.         Behavior: Behavior normal.     Significant Labs: All pertinent labs within the past 24 hours have been reviewed.    Significant Imaging: I have reviewed all pertinent imaging results/findings within the past 24 hours.

## 2024-09-24 NOTE — HOSPITAL COURSE
Patient admitted for sickle cell pain crisis management, initially on multimodal pain mgmt but patient stated that her pain was still uncontrolled and rate it as 10/10 constantly. She requested to increase the dilaudid dose and to get IV benadryl. After multiple attempts of controlling her pain with multimodal regimen she was transitioned to PCA pump initially improving symptoms, however she accidentally pulled her PCA pump line. She requested to go back to multimodal pain management again starting Dilaudid 3mg q3hs, plans to wean off. She later started complaining of itchiness and requested hydroxyzine and promethazine. Plans to wean off dilaudid IV and DC on multimodal po pain management with Mscontin 30 BID, Oxy 10 q4h prn and 15 q4h prn. Will follow with Hematology Oncology outpatient.      Physical Exam:  Constitutional:       Appearance: She is obese.   HENT:      Head: Normocephalic.      Right Ear: Tympanic membrane and ear canal normal.      Left Ear: Tympanic membrane and ear canal normal.      Nose: Nose normal.      Mouth/Throat:      Mouth: Mucous membranes are moist.      Pharynx: Oropharynx is clear.   Eyes:      Extraocular Movements: Extraocular movements intact.      Conjunctiva/sclera: Conjunctivae normal.      Pupils: Pupils are equal, round, and reactive to light.   Cardiovascular:      Rate and Rhythm: Normal rate and regular rhythm.      Pulses: Normal pulses.      Heart sounds: Normal heart sounds.   Pulmonary:      Effort: Pulmonary effort is normal.      Breath sounds: Normal breath sounds.   Abdominal:      General: Bowel sounds are normal. There is no distension.      Palpations: Abdomen is soft. There is no mass.   Musculoskeletal:         General: No swelling or tenderness. Normal range of motion.      Cervical back: Normal range of motion and neck supple.   Skin:     General: Skin is warm and dry.      Capillary Refill: Capillary refill takes less than 2 seconds.      Coloration: Skin  is not jaundiced or pale.   Neurological:      General: No focal deficit present.      Mental Status: She is alert and oriented to person, place, and time.      Cranial Nerves: No cranial nerve deficit.      Sensory: No sensory deficit.      Motor: No weakness.   Psychiatric:      Comments: Mood: sad

## 2024-09-24 NOTE — PROGRESS NOTES
[facility]  Progress Note    Patient Name: Nazanin Malone  MRN: [id number]  Patient Class: IP- Inpatient   Admission Date: 9/23/2024  Length of Stay: 1 days  Attending Physician: Maria Antonia Moore MD  Primary Care Provider: Pee Montgomery MD        Subjective:     Principal Problem:Sickle-cell disease with pain        HPI:  Nazanin Malone is a 44 y.o. female with a hx of HTN, depression, asthma, sickle cell-beta thalassemia, PE, and carotid thrombosis presenting to the ED for chest pain and SOB. Patient says that yesterday morning she woke up with c/o of chest pain and SOB with pain being at 5/10. She took her daily medications and tried to rest but, there was no relief from the pain. Patient then decided to come to the ER for help as she has had acute chest syndrome before and thought that she may be experiencing it again. Patient was in the hospital last week for sickle cell pain crisis in the setting of small L medial malleolus avulsion and has been in and out of the hospital since June due to her sickle cell.    In the ED, patient was given a bolus of LR and received a total of 5mg of dilaudid for pain. Troponin negative at 0.006. D-dimer of 1.48 which appears to be baseline for patient as she takes lovenox at home. Reticulocytes are normal and not consistent with aplastic anemia. CXR showed no acute findings suggestive of acute chest syndrome.    Overview/Hospital Course:  Patient admitted for sickle cell pain crisis management, adjusted pain medication Dilaudid to 1 q4hr, with plans of escalating Mscontin 15BID and [id number] and 5q6hrs for breakthrough pain. No signs of ongoing infection.     Interval History: Patient at bedside complaining of ongoing pain. She refers her pain medicine doesn't cover her for enough time and that she is having breakthrough pain. No episodes of nausea or vomiting. Refers she has had anorexia. Had BM yesterday. Increased gabapentin to TID.  Adjusted opioid mgmt.     Review of Systems  Constitutional:  Positive for chills, diaphoresis, fatigue and fever.   HENT:  Positive for ear pain. Negative for sore throat and trouble swallowing.    Eyes:  Negative for photophobia, pain and redness.   Respiratory:  Positive for chest tightness and shortness of breath. Negative for cough.    Cardiovascular:  Positive for chest pain and palpitations.   Gastrointestinal:  Positive for nausea and vomiting. Negative for blood in stool, constipation and diarrhea.   Genitourinary:  Negative for hematuria.   Musculoskeletal:  Negative for back pain, joint swelling, neck pain and neck stiffness.   Neurological:  Positive for seizures. Negative for dizziness, tremors, syncope, light-headedness and headaches.   Psychiatric/Behavioral:  Negative for agitation, confusion and hallucinations.    Objective:     Vital Signs (Most Recent):  Temp: 99.1 °F (37.3 °C) (09/24/24 1135)  Pulse: 72 (09/24/24 1135)  Resp: 18 (09/24/24 1327)  BP: (!) 143/89 (09/24/24 1135)  SpO2: 98 % (09/24/24 1135) Vital Signs (24h Range):  Temp:  [98.7 °F (37.1 °C)-99.6 °F (37.6 °C)] 99.1 °F (37.3 °C)  Pulse:  [72-82] 72  Resp:  [16-20] 18  SpO2:  [95 %-100 %] 98 %  BP: (132-156)/(84-95) 143/89     Weight: 104.1 kg (229 lb 8 oz)  Body mass index is 41.98 kg/m².    Intake/Output Summary (Last 24 hours) at 9/24/2024 1435  Last data filed at 9/24/2024 0516  Gross per 24 hour   Intake 1240 ml   Output --   Net 1240 ml         Physical Exam      Constitutional:       General: She is not in acute distress.     Appearance: Normal appearance. She is not ill-appearing.   HENT:      Head: Normocephalic.      Nose: Nose normal.   Eyes:      Extraocular Movements: Extraocular movements intact.   Cardiovascular:      Rate and Rhythm: Normal rate and regular rhythm.      Pulses: Normal pulses.      Heart sounds: Murmur heard. Holosystolic 2/6 murmur heard at LLSB     No friction rub. No gallop.   Pulmonary:      Effort:  Pulmonary effort is normal. No respiratory distress.      Breath sounds: Normal breath sounds. No wheezing or rales.   Abdominal:      General: Bowel sounds are normal. There is no distension.      Palpations: Abdomen is soft.      Tenderness: There is no abdominal tenderness. There is no guarding.   Musculoskeletal:         General: Deformity present. R Leg injury. L leg avulsion on middle medial legNo swelling. Normal range of motion.      Cervical back: Normal range of motion. No rigidity or tenderness.   Skin:     General: Skin is warm.   Neurological:      General: No focal deficit present.      Mental Status: She is alert and oriented to person, place, and time.   Psychiatric:         Mood and Affect: Mood normal.         Behavior: Behavior normal.     Significant Labs: All pertinent labs within the past 24 hours have been reviewed.    Significant Imaging: I have reviewed all pertinent imaging results/findings within the past 24 hours.    Assessment/Plan:      * Sickle-cell disease with pain  Nazanin Malone is a 44 y.o. female with a hx of HTN, PE, and sickle cell disease presenting to the ED for chest pain. Patient says that the chest pain started yesterday morning and she had no relief. She has be in and out of the hospital for sickle cell since June of this year. At the moment with breakthrough pain.     Plan:   -Multimodal pain management: IV dilaudid 1q4h. [id number] and 5q6hrs PO. PRN  Mscontin 15 BID. Scheduled. Gabapentin 300 TID. Tylenol 1gr q8h  - keep patient hydrated with IV fluids  - use incentive spirometer to reduce incidence of acute chest syndrome  - continue VTE regimen  - take folic acid daily   - daily CBC  - benadryl prn for itching related to dilaudid        Constipation  Plan:  - patient should continue to take lactulose  - continue to take senna-docusate prn  - have patient ambulate as tolerated      Thrombosis of internal carotid, left  Plan:   - patient is in more hypercoagulable  state  - hx of PE  - continue lovenox  - monitor for s/s of PE and DVT      Hypokalemia  Patient's most recent potassium results are listed below.   Recent Labs     09/23/24  0103   K 3.1*     Plan  - Replete potassium per protocol  - Monitor potassium Daily  - Patient's hypokalemia will be reevaluated after administration of potassium supplements    Morbid obesity  Body mass index is 44.08 kg/m². Morbid obesity complicates all aspects of disease management from diagnostic modalities to treatment. Weight loss encouraged and health benefits explained to patient.         Anxiety  Plan:   - denies thoughts of hurting self or others  - denies depression  - continue to take fluoxetine 40mg daily      Trigeminal neuralgia  Plan:   - continue gabapentin 400mg BID      Hypertension  Plan:   - patient hypertensive on admission  - continue to monitor  - continue amlodipine 10mg daily  - continue carvedilol 25mg BID      VTE Risk Mitigation (From admission, onward)           Ordered     enoxaparin injection 70 mg  Every 12 hours         09/23/24 0447     IP VTE HIGH RISK PATIENT  Once         09/23/24 0443     Place sequential compression device  Until discontinued         09/23/24 0443                    Discharge Planning   ALYSE: 9/25/2024     Code Status: Full Code   Is the patient medically ready for discharge?:     Reason for patient still in hospital (select all that apply): Patient trending condition  Discharge Plan A: Home                  Polo Fuller MD  Department of Hospital Medicine   [facility]     Helical Rim Advancement Flap Text: The defect edges were debeveled with a #15 blade scalpel.  Given the location of the defect and the proximity to free margins (helical rim) a double helical rim advancement flap was deemed most appropriate.  Using a sterile surgical marker, the appropriate advancement flaps were drawn incorporating the defect and placing the expected incisions between the helical rim and antihelix where possible.  The area thus outlined was incised through and through with a #15 scalpel blade.  With a skin hook and iris scissors, the flaps were gently and sharply undermined and freed up.

## 2024-09-24 NOTE — PLAN OF CARE
AAOX4, pain medication, given, fluids running, bed in lowest position, bed locked, side rails up X2, and call light within reach.    Problem: Adult Inpatient Plan of Care  Goal: Plan of Care Review  Outcome: Progressing  Goal: Patient-Specific Goal (Individualized)  Outcome: Progressing  Goal: Absence of Hospital-Acquired Illness or Injury  Outcome: Progressing  Goal: Optimal Comfort and Wellbeing  Outcome: Progressing  Goal: Readiness for Transition of Care  Outcome: Progressing     Problem: Bariatric Environmental Safety  Goal: Safety Maintained with Care  Outcome: Progressing     Problem: Acute Kidney Injury/Impairment  Goal: Fluid and Electrolyte Balance  Outcome: Progressing  Goal: Improved Oral Intake  Outcome: Progressing  Goal: Effective Renal Function  Outcome: Progressing     Problem: Pneumonia  Goal: Fluid Balance  Outcome: Progressing  Goal: Resolution of Infection Signs and Symptoms  Outcome: Progressing  Goal: Effective Oxygenation and Ventilation  Outcome: Progressing

## 2024-09-25 LAB
ALBUMIN SERPL BCP-MCNC: 3.2 G/DL (ref 3.5–5.2)
ALP SERPL-CCNC: 64 U/L (ref 55–135)
ALT SERPL W/O P-5'-P-CCNC: 16 U/L (ref 10–44)
ANION GAP SERPL CALC-SCNC: 5 MMOL/L (ref 8–16)
AST SERPL-CCNC: 26 U/L (ref 10–40)
BASOPHILS # BLD AUTO: 0.05 K/UL (ref 0–0.2)
BASOPHILS NFR BLD: 0.6 % (ref 0–1.9)
BILIRUB SERPL-MCNC: 0.4 MG/DL (ref 0.1–1)
BUN SERPL-MCNC: 11 MG/DL (ref 6–20)
CALCIUM SERPL-MCNC: 8.8 MG/DL (ref 8.7–10.5)
CHLORIDE SERPL-SCNC: 110 MMOL/L (ref 95–110)
CO2 SERPL-SCNC: 24 MMOL/L (ref 23–29)
CREAT SERPL-MCNC: 1 MG/DL (ref 0.5–1.4)
DIFFERENTIAL METHOD BLD: ABNORMAL
EOSINOPHIL # BLD AUTO: 0.3 K/UL (ref 0–0.5)
EOSINOPHIL NFR BLD: 3.5 % (ref 0–8)
ERYTHROCYTE [DISTWIDTH] IN BLOOD BY AUTOMATED COUNT: 19.6 % (ref 11.5–14.5)
EST. GFR  (NO RACE VARIABLE): >60 ML/MIN/1.73 M^2
GLUCOSE SERPL-MCNC: 79 MG/DL (ref 70–110)
HCT VFR BLD AUTO: 24 % (ref 37–48.5)
HGB BLD-MCNC: 8.3 G/DL (ref 12–16)
IMM GRANULOCYTES # BLD AUTO: 0.02 K/UL (ref 0–0.04)
IMM GRANULOCYTES NFR BLD AUTO: 0.2 % (ref 0–0.5)
LYMPHOCYTES # BLD AUTO: 3.9 K/UL (ref 1–4.8)
LYMPHOCYTES NFR BLD: 47.9 % (ref 18–48)
MAGNESIUM SERPL-MCNC: 2.1 MG/DL (ref 1.6–2.6)
MCH RBC QN AUTO: 24.8 PG (ref 27–31)
MCHC RBC AUTO-ENTMCNC: 34.6 G/DL (ref 32–36)
MCV RBC AUTO: 72 FL (ref 82–98)
MONOCYTES # BLD AUTO: 1.1 K/UL (ref 0.3–1)
MONOCYTES NFR BLD: 13.8 % (ref 4–15)
NEUTROPHILS # BLD AUTO: 2.8 K/UL (ref 1.8–7.7)
NEUTROPHILS NFR BLD: 34 % (ref 38–73)
NRBC BLD-RTO: 1 /100 WBC
PHOSPHATE SERPL-MCNC: 3.1 MG/DL (ref 2.7–4.5)
PLATELET # BLD AUTO: 569 K/UL (ref 150–450)
PMV BLD AUTO: 9 FL (ref 9.2–12.9)
POTASSIUM SERPL-SCNC: 3.7 MMOL/L (ref 3.5–5.1)
PROT SERPL-MCNC: 6.7 G/DL (ref 6–8.4)
RBC # BLD AUTO: 3.35 M/UL (ref 4–5.4)
SODIUM SERPL-SCNC: 139 MMOL/L (ref 136–145)
WBC # BLD AUTO: 8.17 K/UL (ref 3.9–12.7)

## 2024-09-25 PROCEDURE — 21400001 HC TELEMETRY ROOM

## 2024-09-25 PROCEDURE — 63600175 PHARM REV CODE 636 W HCPCS

## 2024-09-25 PROCEDURE — 84100 ASSAY OF PHOSPHORUS: CPT

## 2024-09-25 PROCEDURE — S5010 5% DEXTROSE AND 0.45% SALINE: HCPCS

## 2024-09-25 PROCEDURE — 80053 COMPREHEN METABOLIC PANEL: CPT

## 2024-09-25 PROCEDURE — 36415 COLL VENOUS BLD VENIPUNCTURE: CPT

## 2024-09-25 PROCEDURE — 25000003 PHARM REV CODE 250

## 2024-09-25 PROCEDURE — 83735 ASSAY OF MAGNESIUM: CPT

## 2024-09-25 PROCEDURE — 25000003 PHARM REV CODE 250: Performed by: INTERNAL MEDICINE

## 2024-09-25 PROCEDURE — 85025 COMPLETE CBC W/AUTO DIFF WBC: CPT

## 2024-09-25 RX ORDER — OXYCODONE HYDROCHLORIDE 10 MG/1
10 TABLET ORAL EVERY 6 HOURS PRN
Status: DISCONTINUED | OUTPATIENT
Start: 2024-09-25 | End: 2024-09-25

## 2024-09-25 RX ORDER — OXYCODONE HYDROCHLORIDE 5 MG/1
5 TABLET ORAL EVERY 6 HOURS PRN
Status: DISCONTINUED | OUTPATIENT
Start: 2024-09-25 | End: 2024-09-26

## 2024-09-25 RX ORDER — HYDROMORPHONE HYDROCHLORIDE 1 MG/ML
2 INJECTION, SOLUTION INTRAMUSCULAR; INTRAVENOUS; SUBCUTANEOUS EVERY 4 HOURS PRN
Status: DISCONTINUED | OUTPATIENT
Start: 2024-09-25 | End: 2024-09-27

## 2024-09-25 RX ORDER — OXYCODONE HYDROCHLORIDE 10 MG/1
10 TABLET ORAL EVERY 6 HOURS PRN
Status: DISCONTINUED | OUTPATIENT
Start: 2024-09-25 | End: 2024-09-26

## 2024-09-25 RX ORDER — LIDOCAINE 50 MG/G
1 PATCH TOPICAL
Status: DISCONTINUED | OUTPATIENT
Start: 2024-09-25 | End: 2024-10-02 | Stop reason: HOSPADM

## 2024-09-25 RX ADMIN — AMLODIPINE BESYLATE 10 MG: 10 TABLET ORAL at 08:09

## 2024-09-25 RX ADMIN — OXYCODONE HYDROCHLORIDE 10 MG: 10 TABLET ORAL at 08:09

## 2024-09-25 RX ADMIN — ACETAMINOPHEN 1000 MG: 500 TABLET ORAL at 02:09

## 2024-09-25 RX ADMIN — GABAPENTIN 300 MG: 300 CAPSULE ORAL at 08:09

## 2024-09-25 RX ADMIN — HYDROMORPHONE HYDROCHLORIDE 1 MG: 1 INJECTION, SOLUTION INTRAMUSCULAR; INTRAVENOUS; SUBCUTANEOUS at 05:09

## 2024-09-25 RX ADMIN — ENOXAPARIN SODIUM 70 MG: 80 INJECTION SUBCUTANEOUS at 08:09

## 2024-09-25 RX ADMIN — CARVEDILOL 25 MG: 25 TABLET, FILM COATED ORAL at 08:09

## 2024-09-25 RX ADMIN — DEXTROSE AND SODIUM CHLORIDE: 5; 450 INJECTION, SOLUTION INTRAVENOUS at 02:09

## 2024-09-25 RX ADMIN — FLUTICASONE PROPIONATE 100 MCG: 50 SPRAY, METERED NASAL at 08:09

## 2024-09-25 RX ADMIN — GABAPENTIN 300 MG: 300 CAPSULE ORAL at 09:09

## 2024-09-25 RX ADMIN — METHOCARBAMOL 750 MG: 750 TABLET ORAL at 04:09

## 2024-09-25 RX ADMIN — MORPHINE SULFATE 15 MG: 15 TABLET, EXTENDED RELEASE ORAL at 09:09

## 2024-09-25 RX ADMIN — METHOCARBAMOL 750 MG: 750 TABLET ORAL at 12:09

## 2024-09-25 RX ADMIN — OXYCODONE HYDROCHLORIDE 15 MG: 10 TABLET ORAL at 02:09

## 2024-09-25 RX ADMIN — GABAPENTIN 300 MG: 300 CAPSULE ORAL at 02:09

## 2024-09-25 RX ADMIN — HYDROMORPHONE HYDROCHLORIDE 2 MG: 1 INJECTION, SOLUTION INTRAMUSCULAR; INTRAVENOUS; SUBCUTANEOUS at 04:09

## 2024-09-25 RX ADMIN — ENOXAPARIN SODIUM 70 MG: 80 INJECTION SUBCUTANEOUS at 09:09

## 2024-09-25 RX ADMIN — HYDROMORPHONE HYDROCHLORIDE 1 MG: 1 INJECTION, SOLUTION INTRAMUSCULAR; INTRAVENOUS; SUBCUTANEOUS at 01:09

## 2024-09-25 RX ADMIN — DEXTROSE AND SODIUM CHLORIDE: 5; 450 INJECTION, SOLUTION INTRAVENOUS at 03:09

## 2024-09-25 RX ADMIN — ACETAMINOPHEN 1000 MG: 500 TABLET ORAL at 05:09

## 2024-09-25 RX ADMIN — SENNOSIDES AND DOCUSATE SODIUM 1 TABLET: 50; 8.6 TABLET ORAL at 08:09

## 2024-09-25 RX ADMIN — METHOCARBAMOL 750 MG: 750 TABLET ORAL at 09:09

## 2024-09-25 RX ADMIN — FOLIC ACID 1 MG: 1 TABLET ORAL at 08:09

## 2024-09-25 RX ADMIN — MORPHINE SULFATE 15 MG: 15 TABLET, EXTENDED RELEASE ORAL at 08:09

## 2024-09-25 RX ADMIN — FLUOXETINE HYDROCHLORIDE 40 MG: 20 CAPSULE ORAL at 08:09

## 2024-09-25 RX ADMIN — HYDROMORPHONE HYDROCHLORIDE 2 MG: 1 INJECTION, SOLUTION INTRAMUSCULAR; INTRAVENOUS; SUBCUTANEOUS at 10:09

## 2024-09-25 RX ADMIN — HYDROMORPHONE HYDROCHLORIDE 1 MG: 1 INJECTION, SOLUTION INTRAMUSCULAR; INTRAVENOUS; SUBCUTANEOUS at 09:09

## 2024-09-25 RX ADMIN — METHOCARBAMOL 750 MG: 750 TABLET ORAL at 08:09

## 2024-09-25 RX ADMIN — HYDROMORPHONE HYDROCHLORIDE 1 MG: 1 INJECTION, SOLUTION INTRAMUSCULAR; INTRAVENOUS; SUBCUTANEOUS at 12:09

## 2024-09-25 RX ADMIN — CARVEDILOL 25 MG: 25 TABLET, FILM COATED ORAL at 09:09

## 2024-09-25 NOTE — PLAN OF CARE
Problem: Adult Inpatient Plan of Care  Goal: Plan of Care Review  Outcome: Progressing  Goal: Patient-Specific Goal (Individualized)  Outcome: Progressing  Goal: Absence of Hospital-Acquired Illness or Injury  Outcome: Progressing  Goal: Optimal Comfort and Wellbeing  Outcome: Progressing  Goal: Readiness for Transition of Care  Outcome: Progressing     Problem: Bariatric Environmental Safety  Goal: Safety Maintained with Care  Outcome: Progressing     Problem: Acute Kidney Injury/Impairment  Goal: Fluid and Electrolyte Balance  Outcome: Progressing  Goal: Improved Oral Intake  Outcome: Progressing  Goal: Effective Renal Function  Outcome: Progressing     Problem: Pneumonia  Goal: Fluid Balance  Outcome: Progressing  Goal: Resolution of Infection Signs and Symptoms  Outcome: Progressing  Goal: Effective Oxygenation and Ventilation  Outcome: Progressing

## 2024-09-25 NOTE — PROGRESS NOTES
[facility]  Progress Note    Patient Name: Nazanin Malone  MRN: [id number]  Patient Class: IP- Inpatient   Admission Date: 9/23/2024  Length of Stay: 2 days  Attending Physician: Maria Antonia Moore MD  Primary Care Provider: Pee Montgomery MD        Subjective:     Principal Problem:Sickle-cell disease with pain        HPI:  Nazanin Malone is a 44 y.o. female with a hx of HTN, depression, asthma, sickle cell-beta thalassemia, PE, and carotid thrombosis presenting to the ED for chest pain and SOB. Patient says that yesterday morning she woke up with c/o of chest pain and SOB with pain being at 5/10. She took her daily medications and tried to rest but, there was no relief from the pain. Patient then decided to come to the ER for help as she has had acute chest syndrome before and thought that she may be experiencing it again. Patient was in the hospital last week for sickle cell pain crisis in the setting of small L medial malleolus avulsion and has been in and out of the hospital since June due to her sickle cell.    In the ED, patient was given a bolus of LR and received a total of 5mg of dilaudid for pain. Troponin negative at 0.006. D-dimer of 1.48 which appears to be baseline for patient as she takes lovenox at home. Reticulocytes are normal and not consistent with aplastic anemia. CXR showed no acute findings suggestive of acute chest syndrome.    Overview/Hospital Course:  Patient admitted for sickle cell pain crisis management, pt reports no improvement with current pain medication regimen.     Interval History: NAOE, VSS. Pain is not yet controlled, plan to modify pain regimen.     Review of Systems  Objective:     Vital Signs (Most Recent):  Temp: 99 °F (37.2 °C) (09/25/24 1215)  Pulse: 78 (09/25/24 1215)  Resp: 18 (09/25/24 1442)  BP: (!) 160/85 (09/25/24 1215)  SpO2: 98 % (09/25/24 1215) Vital Signs (24h Range):  Temp:  [97.4 °F (36.3 °C)-99 °F (37.2 °C)] 99 °F  (37.2 °C)  Pulse:  [75-79] 78  Resp:  [16-18] 18  SpO2:  [96 %-100 %] 98 %  BP: (123-162)/(74-93) 160/85     Weight: 104.1 kg (229 lb 8 oz)  Body mass index is 41.98 kg/m².    Intake/Output Summary (Last 24 hours) at 9/25/2024 1525  Last data filed at 9/24/2024 1710  Gross per 24 hour   Intake 680 ml   Output --   Net 680 ml         Physical Exam    Physical Exam      Constitutional:       General: She is not in acute distress.     Appearance: Normal appearance. She is not ill-appearing.   HENT:      Head: Normocephalic.      Nose: Nose normal.   Eyes:      Extraocular Movements: Extraocular movements intact.   Cardiovascular:      Rate and Rhythm: Normal rate and regular rhythm.      Pulses: Normal pulses.      Heart sounds: Murmur heard. Holosystolic 2/6 murmur heard at LLSB     No friction rub. No gallop.   Pulmonary:      Effort: Pulmonary effort is normal. No respiratory distress.      Breath sounds: Normal breath sounds. No wheezing or rales.   Abdominal:      General: Bowel sounds are normal. There is no distension.      Palpations: Abdomen is soft.      Tenderness: There is no abdominal tenderness. There is no guarding.   Musculoskeletal:         General: Deformity present. R Leg injury. L leg avulsion on middle medial legNo swelling. Normal range of motion.      Cervical back: Normal range of motion. No rigidity or tenderness.   Skin:     General: Skin is warm.   Neurological:      General: No focal deficit present.      Mental Status: She is alert and oriented to person, place, and time.   Psychiatric:         Mood and Affect: Mood normal.         Behavior: Behavior normal.      Significant Labs: All pertinent labs within the past 24 hours have been reviewed.     Significant Imaging: I have reviewed all pertinent imaging results/findings within the past 24 hours.        Assessment/Plan:      * Sickle-cell disease with pain  Nazanin Malone is a 44 y.o. female with a hx of HTN, PE, and sickle cell  disease presenting to the ED for chest pain. Patient says that the chest pain started yesterday morning and she had no relief. She has be in and out of the hospital for sickle cell since June of this year. At the moment with breakthrough pain.     Plan:   -Multimodal pain management: IV dilaudid 1q4h. [id number] and [id number] PO. PRN  Mscontin 15 BID. Scheduled. Gabapentin 300 TID. Tylenol 1gr q8h  - keep patient hydrated with IV fluids  - use incentive spirometer to reduce incidence of acute chest syndrome  - continue VTE regimen  - take folic acid daily   - daily CBC  - benadryl prn for itching related to dilaudid        Constipation  Plan:  - patient should continue to take lactulose  - continue to take senna-docusate prn  - have patient ambulate as tolerated      Thrombosis of internal carotid, left  Plan:   - patient is in more hypercoagulable state  - hx of PE  - continue lovenox  - monitor for s/s of PE and DVT      Hypokalemia  Patient's most recent potassium results are listed below.   Recent Labs     09/23/24  0103   K 3.1*     Plan  - Replete potassium per protocol  - Monitor potassium Daily  - Patient's hypokalemia will be reevaluated after administration of potassium supplements    Morbid obesity  Body mass index is 44.08 kg/m². Morbid obesity complicates all aspects of disease management from diagnostic modalities to treatment. Weight loss encouraged and health benefits explained to patient.         Anxiety  Plan:   - denies thoughts of hurting self or others  - denies depression  - continue to take fluoxetine 40mg daily      Trigeminal neuralgia  Plan:   - continue gabapentin 400mg BID      Hypertension  Plan:   - patient hypertensive on admission  - continue to monitor  - continue amlodipine 10mg daily  - continue carvedilol 25mg BID      VTE Risk Mitigation (From admission, onward)           Ordered     enoxaparin injection 70 mg  Every 12 hours         09/23/24 0447     IP VTE HIGH RISK PATIENT  Once          09/23/24 0443     Place sequential compression device  Until discontinued         09/23/24 0443                    Discharge Planning   ALYSE: 9/28/2024     Code Status: Full Code   Is the patient medically ready for discharge?:     Reason for patient still in hospital (select all that apply): Patient trending condition and Treatment  Discharge Plan A: Home                  Huber López MD  Department of Hospital Medicine   [facility]

## 2024-09-25 NOTE — SUBJECTIVE & OBJECTIVE
Interval History: NAOE, VSS. Pain is not yet controlled, plan to modify pain regimen.     Review of Systems  Objective:     Vital Signs (Most Recent):  Temp: 99 °F (37.2 °C) (09/25/24 1215)  Pulse: 78 (09/25/24 1215)  Resp: 18 (09/25/24 1442)  BP: (!) 160/85 (09/25/24 1215)  SpO2: 98 % (09/25/24 1215) Vital Signs (24h Range):  Temp:  [97.4 °F (36.3 °C)-99 °F (37.2 °C)] 99 °F (37.2 °C)  Pulse:  [75-79] 78  Resp:  [16-18] 18  SpO2:  [96 %-100 %] 98 %  BP: (123-162)/(74-93) 160/85     Weight: 104.1 kg (229 lb 8 oz)  Body mass index is 41.98 kg/m².    Intake/Output Summary (Last 24 hours) at 9/25/2024 1525  Last data filed at 9/24/2024 1710  Gross per 24 hour   Intake 680 ml   Output --   Net 680 ml         Physical Exam    Physical Exam      Constitutional:       General: She is not in acute distress.     Appearance: Normal appearance. She is not ill-appearing.   HENT:      Head: Normocephalic.      Nose: Nose normal.   Eyes:      Extraocular Movements: Extraocular movements intact.   Cardiovascular:      Rate and Rhythm: Normal rate and regular rhythm.      Pulses: Normal pulses.      Heart sounds: Murmur heard. Holosystolic 2/6 murmur heard at LLSB     No friction rub. No gallop.   Pulmonary:      Effort: Pulmonary effort is normal. No respiratory distress.      Breath sounds: Normal breath sounds. No wheezing or rales.   Abdominal:      General: Bowel sounds are normal. There is no distension.      Palpations: Abdomen is soft.      Tenderness: There is no abdominal tenderness. There is no guarding.   Musculoskeletal:         General: Deformity present. R Leg injury. L leg avulsion on middle medial legNo swelling. Normal range of motion.      Cervical back: Normal range of motion. No rigidity or tenderness.   Skin:     General: Skin is warm.   Neurological:      General: No focal deficit present.      Mental Status: She is alert and oriented to person, place, and time.   Psychiatric:         Mood and Affect: Mood  normal.         Behavior: Behavior normal.      Significant Labs: All pertinent labs within the past 24 hours have been reviewed.     Significant Imaging: I have reviewed all pertinent imaging results/findings within the past 24 hours.

## 2024-09-26 LAB
ALBUMIN SERPL BCP-MCNC: 3.3 G/DL (ref 3.5–5.2)
ALP SERPL-CCNC: 66 U/L (ref 55–135)
ALT SERPL W/O P-5'-P-CCNC: 14 U/L (ref 10–44)
ANION GAP SERPL CALC-SCNC: 5 MMOL/L (ref 8–16)
AST SERPL-CCNC: 18 U/L (ref 10–40)
BASOPHILS # BLD AUTO: 0.06 K/UL (ref 0–0.2)
BASOPHILS NFR BLD: 0.8 % (ref 0–1.9)
BILIRUB SERPL-MCNC: 0.4 MG/DL (ref 0.1–1)
BUN SERPL-MCNC: 9 MG/DL (ref 6–20)
CALCIUM SERPL-MCNC: 9.1 MG/DL (ref 8.7–10.5)
CHLORIDE SERPL-SCNC: 110 MMOL/L (ref 95–110)
CO2 SERPL-SCNC: 24 MMOL/L (ref 23–29)
CREAT SERPL-MCNC: 0.9 MG/DL (ref 0.5–1.4)
DIFFERENTIAL METHOD BLD: ABNORMAL
EOSINOPHIL # BLD AUTO: 0.3 K/UL (ref 0–0.5)
EOSINOPHIL NFR BLD: 3.9 % (ref 0–8)
ERYTHROCYTE [DISTWIDTH] IN BLOOD BY AUTOMATED COUNT: 19.6 % (ref 11.5–14.5)
EST. GFR  (NO RACE VARIABLE): >60 ML/MIN/1.73 M^2
GLUCOSE SERPL-MCNC: 84 MG/DL (ref 70–110)
HCT VFR BLD AUTO: 25 % (ref 37–48.5)
HGB BLD-MCNC: 8.2 G/DL (ref 12–16)
IMM GRANULOCYTES # BLD AUTO: 0.01 K/UL (ref 0–0.04)
IMM GRANULOCYTES NFR BLD AUTO: 0.1 % (ref 0–0.5)
LYMPHOCYTES # BLD AUTO: 3.9 K/UL (ref 1–4.8)
LYMPHOCYTES NFR BLD: 48.1 % (ref 18–48)
MAGNESIUM SERPL-MCNC: 2 MG/DL (ref 1.6–2.6)
MCH RBC QN AUTO: 23.6 PG (ref 27–31)
MCHC RBC AUTO-ENTMCNC: 32.8 G/DL (ref 32–36)
MCV RBC AUTO: 72 FL (ref 82–98)
MONOCYTES # BLD AUTO: 1 K/UL (ref 0.3–1)
MONOCYTES NFR BLD: 12.6 % (ref 4–15)
NEUTROPHILS # BLD AUTO: 2.8 K/UL (ref 1.8–7.7)
NEUTROPHILS NFR BLD: 34.5 % (ref 38–73)
NRBC BLD-RTO: 1 /100 WBC
PHOSPHATE SERPL-MCNC: 3 MG/DL (ref 2.7–4.5)
PLATELET # BLD AUTO: 582 K/UL (ref 150–450)
PMV BLD AUTO: 9.6 FL (ref 9.2–12.9)
POTASSIUM SERPL-SCNC: 3.7 MMOL/L (ref 3.5–5.1)
PROT SERPL-MCNC: 6.8 G/DL (ref 6–8.4)
RBC # BLD AUTO: 3.48 M/UL (ref 4–5.4)
SODIUM SERPL-SCNC: 139 MMOL/L (ref 136–145)
WBC # BLD AUTO: 8 K/UL (ref 3.9–12.7)

## 2024-09-26 PROCEDURE — 63600175 PHARM REV CODE 636 W HCPCS

## 2024-09-26 PROCEDURE — 84100 ASSAY OF PHOSPHORUS: CPT | Performed by: INTERNAL MEDICINE

## 2024-09-26 PROCEDURE — S5010 5% DEXTROSE AND 0.45% SALINE: HCPCS

## 2024-09-26 PROCEDURE — 85025 COMPLETE CBC W/AUTO DIFF WBC: CPT | Performed by: INTERNAL MEDICINE

## 2024-09-26 PROCEDURE — 21400001 HC TELEMETRY ROOM

## 2024-09-26 PROCEDURE — 25000003 PHARM REV CODE 250

## 2024-09-26 PROCEDURE — 36415 COLL VENOUS BLD VENIPUNCTURE: CPT | Performed by: INTERNAL MEDICINE

## 2024-09-26 PROCEDURE — 25000003 PHARM REV CODE 250: Performed by: INTERNAL MEDICINE

## 2024-09-26 PROCEDURE — 83735 ASSAY OF MAGNESIUM: CPT | Performed by: INTERNAL MEDICINE

## 2024-09-26 PROCEDURE — 80053 COMPREHEN METABOLIC PANEL: CPT | Performed by: INTERNAL MEDICINE

## 2024-09-26 RX ORDER — HYDROXYUREA 500 MG/1
500 CAPSULE ORAL DAILY
Status: DISCONTINUED | OUTPATIENT
Start: 2024-09-26 | End: 2024-09-26

## 2024-09-26 RX ORDER — SODIUM CHLORIDE, SODIUM LACTATE, POTASSIUM CHLORIDE, CALCIUM CHLORIDE 600; 310; 30; 20 MG/100ML; MG/100ML; MG/100ML; MG/100ML
INJECTION, SOLUTION INTRAVENOUS CONTINUOUS
Status: DISCONTINUED | OUTPATIENT
Start: 2024-09-26 | End: 2024-09-26

## 2024-09-26 RX ORDER — OXYCODONE HYDROCHLORIDE 10 MG/1
20 TABLET ORAL EVERY 4 HOURS PRN
Status: DISCONTINUED | OUTPATIENT
Start: 2024-09-26 | End: 2024-09-27

## 2024-09-26 RX ORDER — DEXTROSE MONOHYDRATE AND SODIUM CHLORIDE 5; .45 G/100ML; G/100ML
INJECTION, SOLUTION INTRAVENOUS CONTINUOUS
Status: ACTIVE | OUTPATIENT
Start: 2024-09-26 | End: 2024-09-29

## 2024-09-26 RX ORDER — MORPHINE SULFATE 15 MG/1
15 TABLET, FILM COATED, EXTENDED RELEASE ORAL ONCE
Status: COMPLETED | OUTPATIENT
Start: 2024-09-26 | End: 2024-09-26

## 2024-09-26 RX ORDER — OXYCODONE HYDROCHLORIDE 10 MG/1
10 TABLET ORAL EVERY 4 HOURS PRN
Status: DISCONTINUED | OUTPATIENT
Start: 2024-09-26 | End: 2024-09-26

## 2024-09-26 RX ORDER — MORPHINE SULFATE 30 MG/1
30 TABLET, FILM COATED, EXTENDED RELEASE ORAL EVERY 12 HOURS
Status: DISCONTINUED | OUTPATIENT
Start: 2024-09-26 | End: 2024-09-27

## 2024-09-26 RX ADMIN — METHOCARBAMOL 750 MG: 750 TABLET ORAL at 04:09

## 2024-09-26 RX ADMIN — DEXTROSE MONOHYDRATE AND SODIUM CHLORIDE: 5; .45 INJECTION, SOLUTION INTRAVENOUS at 12:09

## 2024-09-26 RX ADMIN — HYDROMORPHONE HYDROCHLORIDE 2 MG: 1 INJECTION, SOLUTION INTRAMUSCULAR; INTRAVENOUS; SUBCUTANEOUS at 10:09

## 2024-09-26 RX ADMIN — MORPHINE SULFATE 15 MG: 15 TABLET, EXTENDED RELEASE ORAL at 10:09

## 2024-09-26 RX ADMIN — OXYCODONE HYDROCHLORIDE 15 MG: 5 TABLET ORAL at 12:09

## 2024-09-26 RX ADMIN — ENOXAPARIN SODIUM 70 MG: 80 INJECTION SUBCUTANEOUS at 08:09

## 2024-09-26 RX ADMIN — HYDROMORPHONE HYDROCHLORIDE 2 MG: 1 INJECTION, SOLUTION INTRAMUSCULAR; INTRAVENOUS; SUBCUTANEOUS at 08:09

## 2024-09-26 RX ADMIN — ACETAMINOPHEN 1000 MG: 500 TABLET ORAL at 08:09

## 2024-09-26 RX ADMIN — METHOCARBAMOL 750 MG: 750 TABLET ORAL at 08:09

## 2024-09-26 RX ADMIN — FLUOXETINE HYDROCHLORIDE 40 MG: 20 CAPSULE ORAL at 08:09

## 2024-09-26 RX ADMIN — FOLIC ACID 1 MG: 1 TABLET ORAL at 08:09

## 2024-09-26 RX ADMIN — METHOCARBAMOL 750 MG: 750 TABLET ORAL at 12:09

## 2024-09-26 RX ADMIN — SODIUM CHLORIDE, POTASSIUM CHLORIDE, SODIUM LACTATE AND CALCIUM CHLORIDE 1000 ML: 600; 310; 30; 20 INJECTION, SOLUTION INTRAVENOUS at 12:09

## 2024-09-26 RX ADMIN — ACETAMINOPHEN 1000 MG: 500 TABLET ORAL at 12:09

## 2024-09-26 RX ADMIN — CARVEDILOL 25 MG: 25 TABLET, FILM COATED ORAL at 08:09

## 2024-09-26 RX ADMIN — OXYCODONE HYDROCHLORIDE 20 MG: 10 TABLET ORAL at 04:09

## 2024-09-26 RX ADMIN — GABAPENTIN 300 MG: 300 CAPSULE ORAL at 02:09

## 2024-09-26 RX ADMIN — OXYCODONE HYDROCHLORIDE 10 MG: 10 TABLET ORAL at 01:09

## 2024-09-26 RX ADMIN — OXYCODONE HYDROCHLORIDE 20 MG: 10 TABLET ORAL at 09:09

## 2024-09-26 RX ADMIN — SENNOSIDES AND DOCUSATE SODIUM 1 TABLET: 50; 8.6 TABLET ORAL at 08:09

## 2024-09-26 RX ADMIN — OXYCODONE HYDROCHLORIDE 10 MG: 10 TABLET ORAL at 08:09

## 2024-09-26 RX ADMIN — HYDROMORPHONE HYDROCHLORIDE 2 MG: 1 INJECTION, SOLUTION INTRAMUSCULAR; INTRAVENOUS; SUBCUTANEOUS at 02:09

## 2024-09-26 RX ADMIN — MORPHINE SULFATE 15 MG: 15 TABLET, EXTENDED RELEASE ORAL at 08:09

## 2024-09-26 RX ADMIN — GABAPENTIN 300 MG: 300 CAPSULE ORAL at 08:09

## 2024-09-26 RX ADMIN — DEXTROSE AND SODIUM CHLORIDE: 5; 450 INJECTION, SOLUTION INTRAVENOUS at 01:09

## 2024-09-26 RX ADMIN — ENOXAPARIN SODIUM 70 MG: 80 INJECTION SUBCUTANEOUS at 09:09

## 2024-09-26 RX ADMIN — MORPHINE SULFATE 30 MG: 30 TABLET, FILM COATED, EXTENDED RELEASE ORAL at 08:09

## 2024-09-26 RX ADMIN — AMLODIPINE BESYLATE 10 MG: 10 TABLET ORAL at 08:09

## 2024-09-26 RX ADMIN — HYDROXYUREA 500 MG: 500 CAPSULE ORAL at 12:09

## 2024-09-26 RX ADMIN — CARVEDILOL 25 MG: 25 TABLET, FILM COATED ORAL at 09:09

## 2024-09-26 NOTE — SUBJECTIVE & OBJECTIVE
Interval History: Patient refers pain has been uncontrolled. No episodes of nausea or vomiting. Remains with anorexia, drinking fluids. Will escalate MS Contin to 30 BID, increase frequency of oxy to 4 hrs and dosage of 10 and 15 q4hs.     Review of Systems  Objective:     Vital Signs (Most Recent):  Temp: 98.7 °F (37.1 °C) (09/26/24 0827)  Pulse: 84 (09/26/24 0827)  Resp: 18 (09/26/24 1038)  BP: (!) 155/97 (09/26/24 0839)  SpO2: 97 % (09/26/24 0827) Vital Signs (24h Range):  Temp:  [98.2 °F (36.8 °C)-99.2 °F (37.3 °C)] 98.7 °F (37.1 °C)  Pulse:  [74-84] 84  Resp:  [17-20] 18  SpO2:  [96 %-98 %] 97 %  BP: (139-160)/(80-97) 155/97     Weight: 104.1 kg (229 lb 8 oz)  Body mass index is 41.98 kg/m².  No intake or output data in the 24 hours ending 09/26/24 1139      Physical Exam      Constitutional:       General: She is not in acute distress.     Appearance: Normal appearance. She is not ill-appearing.   HENT:      Head: Normocephalic.      Nose: Nose normal.   Eyes:      Extraocular Movements: Extraocular movements intact.   Cardiovascular:      Rate and Rhythm: Normal rate and regular rhythm.      Pulses: Normal pulses.      Heart sounds: Murmur heard. Holosystolic 2/6 murmur heard at LLSB     No friction rub. No gallop.   Pulmonary:      Effort: Pulmonary effort is normal. No respiratory distress.      Breath sounds: Normal breath sounds. No wheezing or rales.   Abdominal:      General: Bowel sounds are normal. There is no distension.      Palpations: Abdomen is soft.      Tenderness: There is no abdominal tenderness. There is no guarding.   Musculoskeletal:         General: Deformity present. R Leg injury. L leg avulsion on middle medial legNo swelling. Normal range of motion.      Cervical back: Normal range of motion. No rigidity or tenderness.   Skin:     General: Skin is warm.   Neurological:      General: No focal deficit present.      Mental Status: She is alert and oriented to person, place, and time.    Psychiatric:         Mood and Affect: Mood normal.         Behavior: Behavior normal.     Significant Labs: All pertinent labs within the past 24 hours have been reviewed.    Significant Imaging: I have reviewed all pertinent imaging results/findings within the past 24 hours.

## 2024-09-26 NOTE — ASSESSMENT & PLAN NOTE
Nazanin Malone is a 44 y.o. female with a hx of HTN, PE, and sickle cell disease presenting to the ED for chest pain. Patient says that the chest pain started yesterday morning and she had no relief. She has be in and out of the hospital for sickle cell since June of this year. At the moment with breakthrough pain.     Plan:   -Multimodal pain management: IV dilaudid 2q4h. [id number] and 15q4 PO. PRN  Mscontin 30 BID. Scheduled. Gabapentin 300 TID. Tylenol 1gr q8h  - keep patient hydrated with IV fluids  -Started on O2  -Hydroxyurea for prophylaxis.  - use incentive spirometer to reduce incidence of acute chest syndrome  - continue VTE regimen  - take folic acid daily   - daily CBC  - benadryl prn for itching related to dilaudid  -Will refer to Heme Onc and Pain Medicine outpatient.

## 2024-09-26 NOTE — PLAN OF CARE
CHW met with patient/family at bedside. Patient experience rounding completed and reviewed the following.     Do you know your discharge plan? Yes Home    If you are discharging home, do you have help at home? Yes    Do you think you will need help additional at home at discharge? No     Do you currently have difficulty keeping up with bills, affording medicine or buying food? No    Assigned SW/CM notified of any patient/family needs or concerns. Appropriate resources provided to address patient's needs.

## 2024-09-26 NOTE — PROGRESS NOTES
[facility] Medicine  Progress Note    Patient Name: Nazanin Malone  MRN: [id number]  Patient Class: IP- Inpatient   Admission Date: 9/23/2024  Length of Stay: 3 days  Attending Physician: Maria Antonia Moore MD  Primary Care Provider: Pee Montgomery MD        Subjective:     Principal Problem:Sickle-cell disease with pain        HPI:  Nazanin Malone is a 44 y.o. female with a hx of HTN, depression, asthma, sickle cell-beta thalassemia, PE, and carotid thrombosis presenting to the ED for chest pain and SOB. Patient says that yesterday morning she woke up with c/o of chest pain and SOB with pain being at 5/10. She took her daily medications and tried to rest but, there was no relief from the pain. Patient then decided to come to the ER for help as she has had acute chest syndrome before and thought that she may be experiencing it again. Patient was in the hospital last week for sickle cell pain crisis in the setting of small L medial malleolus avulsion and has been in and out of the hospital since June due to her sickle cell.    In the ED, patient was given a bolus of LR and received a total of 5mg of dilaudid for pain. Troponin negative at 0.006. D-dimer of 1.48 which appears to be baseline for patient as she takes lovenox at home. Reticulocytes are normal and not consistent with aplastic anemia. CXR showed no acute findings suggestive of acute chest syndrome.    Overview/Hospital Course:  Patient admitted for sickle cell pain crisis management, pt reports no improvement with current pain medication regimen.     Interval History: Patient refers pain has been uncontrolled. No episodes of nausea or vomiting. Remains with anorexia, drinking fluids. Will escalate MS Contin to 30 BID, increase frequency of oxy to 4 hrs and dosage of 10 and 15 q4hs.     Review of Systems  Objective:     Vital Signs (Most Recent):  Temp: 98.7 °F (37.1 °C) (09/26/24 0827)  Pulse: 84 (09/26/24 0827)  Resp: 18  (09/26/24 1038)  BP: (!) 155/97 (09/26/24 0839)  SpO2: 97 % (09/26/24 0827) Vital Signs (24h Range):  Temp:  [98.2 °F (36.8 °C)-99.2 °F (37.3 °C)] 98.7 °F (37.1 °C)  Pulse:  [74-84] 84  Resp:  [17-20] 18  SpO2:  [96 %-98 %] 97 %  BP: (139-160)/(80-97) 155/97     Weight: 104.1 kg (229 lb 8 oz)  Body mass index is 41.98 kg/m².  No intake or output data in the 24 hours ending 09/26/24 1139      Physical Exam      Constitutional:       General: She is not in acute distress.     Appearance: Normal appearance. She is not ill-appearing.   HENT:      Head: Normocephalic.      Nose: Nose normal.   Eyes:      Extraocular Movements: Extraocular movements intact.   Cardiovascular:      Rate and Rhythm: Normal rate and regular rhythm.      Pulses: Normal pulses.      Heart sounds: Murmur heard. Holosystolic 2/6 murmur heard at LLSB     No friction rub. No gallop.   Pulmonary:      Effort: Pulmonary effort is normal. No respiratory distress.      Breath sounds: Normal breath sounds. No wheezing or rales.   Abdominal:      General: Bowel sounds are normal. There is no distension.      Palpations: Abdomen is soft.      Tenderness: There is no abdominal tenderness. There is no guarding.   Musculoskeletal:         General: Deformity present. R Leg injury. L leg avulsion on middle medial legNo swelling. Normal range of motion.      Cervical back: Normal range of motion. No rigidity or tenderness.   Skin:     General: Skin is warm.   Neurological:      General: No focal deficit present.      Mental Status: She is alert and oriented to person, place, and time.   Psychiatric:         Mood and Affect: Mood normal.         Behavior: Behavior normal.     Significant Labs: All pertinent labs within the past 24 hours have been reviewed.    Significant Imaging: I have reviewed all pertinent imaging results/findings within the past 24 hours.    Assessment/Plan:      * Sickle-cell disease with pain  Nazanin Malone is a 44 y.o. female with a  hx of HTN, PE, and sickle cell disease presenting to the ED for chest pain. Patient says that the chest pain started yesterday morning and she had no relief. She has be in and out of the hospital for sickle cell since June of this year. At the moment with breakthrough pain.     Plan:   -Multimodal pain management: IV dilaudid 2q4h. [id number] and 15q4 PO. PRN  Mscontin 30 BID. Scheduled. Gabapentin 300 TID. Tylenol 1gr q8h  - keep patient hydrated with IV fluids  -Started on O2  - use incentive spirometer to reduce incidence of acute chest syndrome  - continue VTE regimen  - take folic acid daily   - daily CBC  - benadryl prn for itching related to dilaudid  -Will refer to Heme Onc and Pain Medicine outpatient.       Constipation  Plan:  - patient should continue to take lactulose  - continue to take senna-docusate prn  - have patient ambulate as tolerated      Thrombosis of internal carotid, left  Plan:   - patient is in more hypercoagulable state  - hx of PE  - continue lovenox  - monitor for s/s of PE and DVT      Hypokalemia  Patient's most recent potassium results are listed below.   Recent Labs     09/23/24  0103   K 3.1*     Plan  - Replete potassium per protocol  - Monitor potassium Daily  - Patient's hypokalemia will be reevaluated after administration of potassium supplements    Morbid obesity  Body mass index is 44.08 kg/m². Morbid obesity complicates all aspects of disease management from diagnostic modalities to treatment. Weight loss encouraged and health benefits explained to patient.         Anxiety  Plan:   - denies thoughts of hurting self or others  - denies depression  - continue to take fluoxetine 40mg daily      Trigeminal neuralgia  Plan:   - continue gabapentin 400mg BID      Hypertension  Plan:   - patient hypertensive on admission  - continue to monitor  - continue amlodipine 10mg daily  - continue carvedilol 25mg BID      VTE Risk Mitigation (From admission, onward)           Ordered      enoxaparin injection 70 mg  Every 12 hours         09/23/24 0447     IP VTE HIGH RISK PATIENT  Once         09/23/24 0443     Place sequential compression device  Until discontinued         09/23/24 0443                    Discharge Planning   ALYSE: 9/28/2024     Code Status: Full Code   Is the patient medically ready for discharge?:     Reason for patient still in hospital (select all that apply): Patient trending condition  Discharge Plan A: Home                  Polo Fuller MD  Department of Hospital Medicine   [facility]

## 2024-09-27 LAB
ALBUMIN SERPL BCP-MCNC: 3.1 G/DL (ref 3.5–5.2)
ALP SERPL-CCNC: 64 U/L (ref 55–135)
ALT SERPL W/O P-5'-P-CCNC: 11 U/L (ref 10–44)
ANION GAP SERPL CALC-SCNC: 7 MMOL/L (ref 8–16)
AST SERPL-CCNC: 17 U/L (ref 10–40)
BASOPHILS # BLD AUTO: 0.05 K/UL (ref 0–0.2)
BASOPHILS NFR BLD: 0.7 % (ref 0–1.9)
BILIRUB SERPL-MCNC: 0.5 MG/DL (ref 0.1–1)
BUN SERPL-MCNC: 12 MG/DL (ref 6–20)
CALCIUM SERPL-MCNC: 9.1 MG/DL (ref 8.7–10.5)
CHLORIDE SERPL-SCNC: 109 MMOL/L (ref 95–110)
CO2 SERPL-SCNC: 24 MMOL/L (ref 23–29)
CREAT SERPL-MCNC: 1.1 MG/DL (ref 0.5–1.4)
DIFFERENTIAL METHOD BLD: ABNORMAL
EOSINOPHIL # BLD AUTO: 0.3 K/UL (ref 0–0.5)
EOSINOPHIL NFR BLD: 4.1 % (ref 0–8)
ERYTHROCYTE [DISTWIDTH] IN BLOOD BY AUTOMATED COUNT: 19.7 % (ref 11.5–14.5)
EST. GFR  (NO RACE VARIABLE): >60 ML/MIN/1.73 M^2
FACT X PPP CHRO-ACNC: 1.04 IU/ML (ref 0.3–0.7)
GLUCOSE SERPL-MCNC: 106 MG/DL (ref 70–110)
HCT VFR BLD AUTO: 24.3 % (ref 37–48.5)
HGB BLD-MCNC: 8.2 G/DL (ref 12–16)
IMM GRANULOCYTES # BLD AUTO: 0.02 K/UL (ref 0–0.04)
IMM GRANULOCYTES NFR BLD AUTO: 0.3 % (ref 0–0.5)
LYMPHOCYTES # BLD AUTO: 2.6 K/UL (ref 1–4.8)
LYMPHOCYTES NFR BLD: 37.5 % (ref 18–48)
MAGNESIUM SERPL-MCNC: 1.9 MG/DL (ref 1.6–2.6)
MCH RBC QN AUTO: 24.6 PG (ref 27–31)
MCHC RBC AUTO-ENTMCNC: 33.7 G/DL (ref 32–36)
MCV RBC AUTO: 73 FL (ref 82–98)
MONOCYTES # BLD AUTO: 1 K/UL (ref 0.3–1)
MONOCYTES NFR BLD: 14.1 % (ref 4–15)
NEUTROPHILS # BLD AUTO: 3 K/UL (ref 1.8–7.7)
NEUTROPHILS NFR BLD: 43.3 % (ref 38–73)
NRBC BLD-RTO: 0 /100 WBC
PHOSPHATE SERPL-MCNC: 4 MG/DL (ref 2.7–4.5)
PLATELET # BLD AUTO: 501 K/UL (ref 150–450)
PMV BLD AUTO: 9.7 FL (ref 9.2–12.9)
POTASSIUM SERPL-SCNC: 3.8 MMOL/L (ref 3.5–5.1)
PROT SERPL-MCNC: 6.7 G/DL (ref 6–8.4)
RBC # BLD AUTO: 3.34 M/UL (ref 4–5.4)
SODIUM SERPL-SCNC: 140 MMOL/L (ref 136–145)
WBC # BLD AUTO: 7.02 K/UL (ref 3.9–12.7)

## 2024-09-27 PROCEDURE — 63600175 PHARM REV CODE 636 W HCPCS

## 2024-09-27 PROCEDURE — 25000003 PHARM REV CODE 250

## 2024-09-27 PROCEDURE — 85025 COMPLETE CBC W/AUTO DIFF WBC: CPT

## 2024-09-27 PROCEDURE — 25000003 PHARM REV CODE 250: Performed by: INTERNAL MEDICINE

## 2024-09-27 PROCEDURE — 84100 ASSAY OF PHOSPHORUS: CPT

## 2024-09-27 PROCEDURE — 85520 HEPARIN ASSAY: CPT | Performed by: INTERNAL MEDICINE

## 2024-09-27 PROCEDURE — 21400001 HC TELEMETRY ROOM

## 2024-09-27 PROCEDURE — 83735 ASSAY OF MAGNESIUM: CPT

## 2024-09-27 PROCEDURE — 80053 COMPREHEN METABOLIC PANEL: CPT

## 2024-09-27 PROCEDURE — S5010 5% DEXTROSE AND 0.45% SALINE: HCPCS

## 2024-09-27 PROCEDURE — 36415 COLL VENOUS BLD VENIPUNCTURE: CPT | Performed by: INTERNAL MEDICINE

## 2024-09-27 RX ORDER — NALOXONE HCL 0.4 MG/ML
0.02 VIAL (ML) INJECTION
Status: DISCONTINUED | OUTPATIENT
Start: 2024-09-27 | End: 2024-09-29

## 2024-09-27 RX ORDER — HYDROMORPHONE HYDROCHLORIDE 1 MG/ML
0.5 INJECTION, SOLUTION INTRAMUSCULAR; INTRAVENOUS; SUBCUTANEOUS
Status: DISCONTINUED | OUTPATIENT
Start: 2024-09-27 | End: 2024-09-29

## 2024-09-27 RX ADMIN — GABAPENTIN 300 MG: 300 CAPSULE ORAL at 09:09

## 2024-09-27 RX ADMIN — FOLIC ACID 1 MG: 1 TABLET ORAL at 08:09

## 2024-09-27 RX ADMIN — OXYCODONE HYDROCHLORIDE 20 MG: 10 TABLET ORAL at 08:09

## 2024-09-27 RX ADMIN — ENOXAPARIN SODIUM 70 MG: 80 INJECTION SUBCUTANEOUS at 08:09

## 2024-09-27 RX ADMIN — METHOCARBAMOL 750 MG: 750 TABLET ORAL at 12:09

## 2024-09-27 RX ADMIN — ACETAMINOPHEN 1000 MG: 500 TABLET ORAL at 06:09

## 2024-09-27 RX ADMIN — HYDROMORPHONE HYDROCHLORIDE 2 MG: 1 INJECTION, SOLUTION INTRAMUSCULAR; INTRAVENOUS; SUBCUTANEOUS at 02:09

## 2024-09-27 RX ADMIN — FLUTICASONE PROPIONATE 100 MCG: 50 SPRAY, METERED NASAL at 08:09

## 2024-09-27 RX ADMIN — DEXTROSE MONOHYDRATE AND SODIUM CHLORIDE: 5; .45 INJECTION, SOLUTION INTRAVENOUS at 02:09

## 2024-09-27 RX ADMIN — HYDROMORPHONE HYDROCHLORIDE 0.5 MG: 1 INJECTION, SOLUTION INTRAMUSCULAR; INTRAVENOUS; SUBCUTANEOUS at 01:09

## 2024-09-27 RX ADMIN — GABAPENTIN 300 MG: 300 CAPSULE ORAL at 08:09

## 2024-09-27 RX ADMIN — ENOXAPARIN SODIUM 70 MG: 80 INJECTION SUBCUTANEOUS at 09:09

## 2024-09-27 RX ADMIN — CARVEDILOL 25 MG: 25 TABLET, FILM COATED ORAL at 08:09

## 2024-09-27 RX ADMIN — OXYCODONE HYDROCHLORIDE 20 MG: 10 TABLET ORAL at 04:09

## 2024-09-27 RX ADMIN — FLUOXETINE HYDROCHLORIDE 40 MG: 20 CAPSULE ORAL at 08:09

## 2024-09-27 RX ADMIN — HYDROMORPHONE HYDROCHLORIDE 2 MG: 1 INJECTION, SOLUTION INTRAMUSCULAR; INTRAVENOUS; SUBCUTANEOUS at 10:09

## 2024-09-27 RX ADMIN — METHOCARBAMOL 750 MG: 750 TABLET ORAL at 08:09

## 2024-09-27 RX ADMIN — METHOCARBAMOL 750 MG: 750 TABLET ORAL at 05:09

## 2024-09-27 RX ADMIN — ACETAMINOPHEN 1000 MG: 500 TABLET ORAL at 09:09

## 2024-09-27 RX ADMIN — HYDROMORPHONE HYDROCHLORIDE 2 MG: 1 INJECTION, SOLUTION INTRAMUSCULAR; INTRAVENOUS; SUBCUTANEOUS at 06:09

## 2024-09-27 RX ADMIN — AMLODIPINE BESYLATE 10 MG: 10 TABLET ORAL at 08:09

## 2024-09-27 RX ADMIN — OXYCODONE HYDROCHLORIDE 15 MG: 5 TABLET ORAL at 11:09

## 2024-09-27 RX ADMIN — DEXTROSE MONOHYDRATE AND SODIUM CHLORIDE: 5; .45 INJECTION, SOLUTION INTRAVENOUS at 01:09

## 2024-09-27 RX ADMIN — HYDROMORPHONE HYDROCHLORIDE: 10 INJECTION, SOLUTION INTRAMUSCULAR; INTRAVENOUS; SUBCUTANEOUS at 02:09

## 2024-09-27 RX ADMIN — DIPHENHYDRAMINE HYDROCHLORIDE 25 MG: 25 CAPSULE ORAL at 09:09

## 2024-09-27 RX ADMIN — GABAPENTIN 300 MG: 300 CAPSULE ORAL at 02:09

## 2024-09-27 RX ADMIN — METHOCARBAMOL 750 MG: 750 TABLET ORAL at 09:09

## 2024-09-27 RX ADMIN — CARVEDILOL 25 MG: 25 TABLET, FILM COATED ORAL at 09:09

## 2024-09-27 RX ADMIN — SENNOSIDES AND DOCUSATE SODIUM 1 TABLET: 50; 8.6 TABLET ORAL at 08:09

## 2024-09-27 RX ADMIN — MORPHINE SULFATE 30 MG: 30 TABLET, FILM COATED, EXTENDED RELEASE ORAL at 08:09

## 2024-09-27 NOTE — ASSESSMENT & PLAN NOTE
Nazanin Malone is a 44 y.o. female with a hx of HTN, PE, and sickle cell disease presenting to the ED for chest pain. Patient says that the chest pain started yesterday morning and she had no relief. She has be in and out of the hospital for sickle cell since June of this year. At the moment with breakthrough pain.     Plan:   -Will start PCA pump  -Multimodal pain management: IV dilaudid 2q4h. [id number] and 20q4 PO. PRN  Mscontin 30 BID. Scheduled. Gabapentin 300 TID. Tylenol 1gr q8h  - keep patient hydrated with IV fluids  -Started on O2  -Hydroxyurea for prophylaxis.  - use incentive spirometer to reduce incidence of acute chest syndrome  - continue VTE regimen  - take folic acid daily   - daily CBC  - benadryl prn for itching related to dilaudid  -Will refer to Heme Onc and Pain Medicine outpatient.

## 2024-09-27 NOTE — SUBJECTIVE & OBJECTIVE
Interval History: Patient complaining of a lot of pain. Very angry, says we dont take into account her pain and how she feels. Still with low appetite, having bowel movements. Offered her PCA pump.    Review of Systems  Objective:     Vital Signs (Most Recent):  Temp: 98.6 °F (37 °C) (09/27/24 0833)  Pulse: 69 (09/27/24 0833)  Resp: 12 (09/27/24 1120)  BP: (!) 143/95 (09/27/24 0833)  SpO2: 98 % (09/27/24 0833) Vital Signs (24h Range):  Temp:  [98.3 °F (36.8 °C)-99 °F (37.2 °C)] 98.6 °F (37 °C)  Pulse:  [69-77] 69  Resp:  [12-18] 12  SpO2:  [98 %-100 %] 98 %  BP: (125-155)/(72-95) 143/95     Weight: 104.1 kg (229 lb 8 oz)  Body mass index is 41.98 kg/m².  No intake or output data in the 24 hours ending 09/27/24 1142      Physical Exam      Constitutional:       General: She is not in acute distress.     Appearance: Normal appearance. She is not ill-appearing.   HENT:      Head: Normocephalic.      Nose: Nose normal.   Eyes:      Extraocular Movements: Extraocular movements intact.   Cardiovascular:      Rate and Rhythm: Normal rate and regular rhythm.      Pulses: Normal pulses.      Heart sounds: Murmur heard. Holosystolic 2/6 murmur heard at LLSB     No friction rub. No gallop.   Pulmonary:      Effort: Pulmonary effort is normal. No respiratory distress.      Breath sounds: Normal breath sounds. No wheezing or rales.   Abdominal:      General: Bowel sounds are normal. There is no distension.      Palpations: Abdomen is soft.      Tenderness: There is no abdominal tenderness. There is no guarding.   Musculoskeletal:         General: Deformity present. R Leg injury. L leg avulsion on middle medial legNo swelling. Normal range of motion.      Cervical back: Normal range of motion. No rigidity or tenderness.   Skin:     General: Skin is warm.   Neurological:      General: No focal deficit present.      Mental Status: She is alert and oriented to person, place, and time.   Psychiatric:         Mood and Affect: Mood  normal.         Behavior: Behavior normal.      Significant Labs: All pertinent labs within the past 24 hours have been reviewed.    Significant Imaging: I have reviewed all pertinent imaging results/findings within the past 24 hours.

## 2024-09-27 NOTE — PLAN OF CARE
Set up pain pca pump. Pt states her pain is currently at a 7. Will continue to monitor. No falls or injuries. Sent blood spec to lab for the second time today ....anti xa heparin monitoring.... no falls or injuries . Will continue to monitor

## 2024-09-27 NOTE — PROGRESS NOTES
[facility] Medicine  Progress Note    Patient Name: Nazanin Malone  MRN: [id number]  Patient Class: IP- Inpatient   Admission Date: 9/23/2024  Length of Stay: 4 days  Attending Physician: Maria Antonia Moore MD  Primary Care Provider: Pee Montgomery MD        Subjective:     Principal Problem:Sickle-cell disease with pain        HPI:  Nazanin Malone is a 44 y.o. female with a hx of HTN, depression, asthma, sickle cell-beta thalassemia, PE, and carotid thrombosis presenting to the ED for chest pain and SOB. Patient says that yesterday morning she woke up with c/o of chest pain and SOB with pain being at 5/10. She took her daily medications and tried to rest but, there was no relief from the pain. Patient then decided to come to the ER for help as she has had acute chest syndrome before and thought that she may be experiencing it again. Patient was in the hospital last week for sickle cell pain crisis in the setting of small L medial malleolus avulsion and has been in and out of the hospital since June due to her sickle cell.    In the ED, patient was given a bolus of LR and received a total of 5mg of dilaudid for pain. Troponin negative at 0.006. D-dimer of 1.48 which appears to be baseline for patient as she takes lovenox at home. Reticulocytes are normal and not consistent with aplastic anemia. CXR showed no acute findings suggestive of acute chest syndrome.    Overview/Hospital Course:  Patient admitted for sickle cell pain crisis management, pt reports no improvement with current pain medication regimen.     Interval History: Patient complaining of a lot of pain. Very angry, says we dont take into account her pain and how she feels. Still with low appetite, having bowel movements. Offered her PCA pump.    Review of Systems  Objective:     Vital Signs (Most Recent):  Temp: 98.6 °F (37 °C) (09/27/24 0833)  Pulse: 69 (09/27/24 0833)  Resp: 12 (09/27/24 1120)  BP: (!) 143/95 (09/27/24  0833)  SpO2: 98 % (09/27/24 0833) Vital Signs (24h Range):  Temp:  [98.3 °F (36.8 °C)-99 °F (37.2 °C)] 98.6 °F (37 °C)  Pulse:  [69-77] 69  Resp:  [12-18] 12  SpO2:  [98 %-100 %] 98 %  BP: (125-155)/(72-95) 143/95     Weight: 104.1 kg (229 lb 8 oz)  Body mass index is 41.98 kg/m².  No intake or output data in the 24 hours ending 09/27/24 1142      Physical Exam      Constitutional:       General: She is not in acute distress.     Appearance: Normal appearance. She is not ill-appearing.   HENT:      Head: Normocephalic.      Nose: Nose normal.   Eyes:      Extraocular Movements: Extraocular movements intact.   Cardiovascular:      Rate and Rhythm: Normal rate and regular rhythm.      Pulses: Normal pulses.      Heart sounds: Murmur heard. Holosystolic 2/6 murmur heard at LLSB     No friction rub. No gallop.   Pulmonary:      Effort: Pulmonary effort is normal. No respiratory distress.      Breath sounds: Normal breath sounds. No wheezing or rales.   Abdominal:      General: Bowel sounds are normal. There is no distension.      Palpations: Abdomen is soft.      Tenderness: There is no abdominal tenderness. There is no guarding.   Musculoskeletal:         General: Deformity present. R Leg injury. L leg avulsion on middle medial legNo swelling. Normal range of motion.      Cervical back: Normal range of motion. No rigidity or tenderness.   Skin:     General: Skin is warm.   Neurological:      General: No focal deficit present.      Mental Status: She is alert and oriented to person, place, and time.   Psychiatric:         Mood and Affect: Mood normal.         Behavior: Behavior normal.      Significant Labs: All pertinent labs within the past 24 hours have been reviewed.    Significant Imaging: I have reviewed all pertinent imaging results/findings within the past 24 hours.    Assessment/Plan:      * Sickle-cell disease with pain  Nazanin Malone is a 44 y.o. female with a hx of HTN, PE, and sickle cell disease  presenting to the ED for chest pain. Patient says that the chest pain started yesterday morning and she had no relief. She has be in and out of the hospital for sickle cell since June of this year. At the moment with breakthrough pain.     Plan:   -Will start PCA pump  -Multimodal pain management: Scheduled lidocaine patch, methocarbamol. Gabapentin 300 TID. Tylenol 1gr q8h  - keep patient hydrated with IV fluids  -Started on O2  - use incentive spirometer to reduce incidence of acute chest syndrome  - continue VTE regimen  - take folic acid daily   - daily CBC  - benadryl PO prn for itching related to dilaudid  -Will refer to Heme Onc and Pain Medicine outpatient.       Constipation  Plan:  - patient should continue to take lactulose  - continue to take senna-docusate prn  - have patient ambulate as tolerated      Thrombosis of internal carotid, left  Plan:   - patient is in more hypercoagulable state  - hx of PE  - continue lovenox  - monitor for s/s of PE and DVT      Hypokalemia  Patient's most recent potassium results are listed below.   Recent Labs     09/23/24  0103   K 3.1*     Plan  - Replete potassium per protocol  - Monitor potassium Daily  - Patient's hypokalemia will be reevaluated after administration of potassium supplements    Morbid obesity  Body mass index is 44.08 kg/m². Morbid obesity complicates all aspects of disease management from diagnostic modalities to treatment. Weight loss encouraged and health benefits explained to patient.         Anxiety  Plan:   - denies thoughts of hurting self or others  - denies depression  - continue to take fluoxetine 40mg daily      Trigeminal neuralgia  Plan:   - continue gabapentin 400mg BID      Hypertension  Plan:   - patient hypertensive on admission  - continue to monitor  - continue amlodipine 10mg daily  - continue carvedilol 25mg BID      VTE Risk Mitigation (From admission, onward)           Ordered     enoxaparin injection 70 mg  Every 12 hours          09/23/24 0447     IP VTE HIGH RISK PATIENT  Once         09/23/24 0443     Place sequential compression device  Until discontinued         09/23/24 0443                    Discharge Planning   ALYSE: 9/30/2024     Code Status: Full Code   Is the patient medically ready for discharge?:     Reason for patient still in hospital (select all that apply): Patient trending condition  Discharge Plan A: Home                  Polo Fuller MD  Department of Hospital Medicine   [facility]

## 2024-09-28 PROBLEM — E87.6 HYPOKALEMIA: Status: RESOLVED | Noted: 2020-06-08 | Resolved: 2024-09-28

## 2024-09-28 LAB
ALBUMIN SERPL BCP-MCNC: 3.3 G/DL (ref 3.5–5.2)
ALP SERPL-CCNC: 66 U/L (ref 55–135)
ALT SERPL W/O P-5'-P-CCNC: 14 U/L (ref 10–44)
ANION GAP SERPL CALC-SCNC: 7 MMOL/L (ref 8–16)
AST SERPL-CCNC: 20 U/L (ref 10–40)
BASOPHILS # BLD AUTO: 0.06 K/UL (ref 0–0.2)
BASOPHILS NFR BLD: 0.9 % (ref 0–1.9)
BILIRUB SERPL-MCNC: 0.4 MG/DL (ref 0.1–1)
BUN SERPL-MCNC: 17 MG/DL (ref 6–20)
CALCIUM SERPL-MCNC: 8.9 MG/DL (ref 8.7–10.5)
CHLORIDE SERPL-SCNC: 106 MMOL/L (ref 95–110)
CO2 SERPL-SCNC: 23 MMOL/L (ref 23–29)
CREAT SERPL-MCNC: 1.3 MG/DL (ref 0.5–1.4)
DIFFERENTIAL METHOD BLD: ABNORMAL
EOSINOPHIL # BLD AUTO: 0.4 K/UL (ref 0–0.5)
EOSINOPHIL NFR BLD: 6 % (ref 0–8)
ERYTHROCYTE [DISTWIDTH] IN BLOOD BY AUTOMATED COUNT: 20.1 % (ref 11.5–14.5)
EST. GFR  (NO RACE VARIABLE): 51.4 ML/MIN/1.73 M^2
FACT X PPP CHRO-ACNC: 0.25 IU/ML (ref 0.3–0.7)
FACT X PPP CHRO-ACNC: 1.23 IU/ML (ref 0.3–0.7)
GLUCOSE SERPL-MCNC: 117 MG/DL (ref 70–110)
HCT VFR BLD AUTO: 23.8 % (ref 37–48.5)
HGB BLD-MCNC: 8.1 G/DL (ref 12–16)
IMM GRANULOCYTES # BLD AUTO: 0.06 K/UL (ref 0–0.04)
IMM GRANULOCYTES NFR BLD AUTO: 0.9 % (ref 0–0.5)
LYMPHOCYTES # BLD AUTO: 2.9 K/UL (ref 1–4.8)
LYMPHOCYTES NFR BLD: 45 % (ref 18–48)
MAGNESIUM SERPL-MCNC: 1.9 MG/DL (ref 1.6–2.6)
MCH RBC QN AUTO: 24.9 PG (ref 27–31)
MCHC RBC AUTO-ENTMCNC: 34 G/DL (ref 32–36)
MCV RBC AUTO: 73 FL (ref 82–98)
MONOCYTES # BLD AUTO: 1 K/UL (ref 0.3–1)
MONOCYTES NFR BLD: 14.9 % (ref 4–15)
NEUTROPHILS # BLD AUTO: 2.1 K/UL (ref 1.8–7.7)
NEUTROPHILS NFR BLD: 32.3 % (ref 38–73)
NRBC BLD-RTO: 1 /100 WBC
PHOSPHATE SERPL-MCNC: 4.2 MG/DL (ref 2.7–4.5)
PLATELET # BLD AUTO: 358 K/UL (ref 150–450)
PLATELET BLD QL SMEAR: ABNORMAL
PMV BLD AUTO: 10.5 FL (ref 9.2–12.9)
POIKILOCYTOSIS BLD QL SMEAR: ABNORMAL
POTASSIUM SERPL-SCNC: 4 MMOL/L (ref 3.5–5.1)
PROT SERPL-MCNC: 6.8 G/DL (ref 6–8.4)
RBC # BLD AUTO: 3.25 M/UL (ref 4–5.4)
SODIUM SERPL-SCNC: 136 MMOL/L (ref 136–145)
TARGETS BLD QL SMEAR: ABNORMAL
WBC # BLD AUTO: 6.36 K/UL (ref 3.9–12.7)

## 2024-09-28 PROCEDURE — 84100 ASSAY OF PHOSPHORUS: CPT

## 2024-09-28 PROCEDURE — 36415 COLL VENOUS BLD VENIPUNCTURE: CPT

## 2024-09-28 PROCEDURE — 63600175 PHARM REV CODE 636 W HCPCS

## 2024-09-28 PROCEDURE — 21400001 HC TELEMETRY ROOM

## 2024-09-28 PROCEDURE — 80053 COMPREHEN METABOLIC PANEL: CPT

## 2024-09-28 PROCEDURE — 76937 US GUIDE VASCULAR ACCESS: CPT

## 2024-09-28 PROCEDURE — 25000003 PHARM REV CODE 250

## 2024-09-28 PROCEDURE — 85520 HEPARIN ASSAY: CPT | Mod: 91 | Performed by: INTERNAL MEDICINE

## 2024-09-28 PROCEDURE — C1751 CATH, INF, PER/CENT/MIDLINE: HCPCS

## 2024-09-28 PROCEDURE — 25000003 PHARM REV CODE 250: Performed by: INTERNAL MEDICINE

## 2024-09-28 PROCEDURE — 85025 COMPLETE CBC W/AUTO DIFF WBC: CPT

## 2024-09-28 PROCEDURE — S5010 5% DEXTROSE AND 0.45% SALINE: HCPCS

## 2024-09-28 PROCEDURE — 85520 HEPARIN ASSAY: CPT

## 2024-09-28 PROCEDURE — 36410 VNPNXR 3YR/> PHY/QHP DX/THER: CPT

## 2024-09-28 PROCEDURE — 83735 ASSAY OF MAGNESIUM: CPT

## 2024-09-28 RX ORDER — MUPIROCIN 20 MG/G
OINTMENT TOPICAL 2 TIMES DAILY
Status: DISCONTINUED | OUTPATIENT
Start: 2024-09-28 | End: 2024-10-02 | Stop reason: HOSPADM

## 2024-09-28 RX ORDER — ENOXAPARIN SODIUM 100 MG/ML
60 INJECTION SUBCUTANEOUS EVERY 12 HOURS
Status: DISCONTINUED | OUTPATIENT
Start: 2024-09-29 | End: 2024-10-02 | Stop reason: HOSPADM

## 2024-09-28 RX ORDER — HYDROMORPHONE HYDROCHLORIDE 1 MG/ML
2 INJECTION, SOLUTION INTRAMUSCULAR; INTRAVENOUS; SUBCUTANEOUS ONCE AS NEEDED
Status: COMPLETED | OUTPATIENT
Start: 2024-09-28 | End: 2024-09-28

## 2024-09-28 RX ORDER — MAGNESIUM SULFATE HEPTAHYDRATE 40 MG/ML
2 INJECTION, SOLUTION INTRAVENOUS ONCE
Status: COMPLETED | OUTPATIENT
Start: 2024-09-28 | End: 2024-09-28

## 2024-09-28 RX ADMIN — ACETAMINOPHEN 1000 MG: 500 TABLET ORAL at 06:09

## 2024-09-28 RX ADMIN — CARVEDILOL 25 MG: 25 TABLET, FILM COATED ORAL at 09:09

## 2024-09-28 RX ADMIN — METHOCARBAMOL 750 MG: 750 TABLET ORAL at 09:09

## 2024-09-28 RX ADMIN — METHOCARBAMOL 750 MG: 750 TABLET ORAL at 04:09

## 2024-09-28 RX ADMIN — FLUTICASONE PROPIONATE 100 MCG: 50 SPRAY, METERED NASAL at 08:09

## 2024-09-28 RX ADMIN — HYDROMORPHONE HYDROCHLORIDE: 10 INJECTION, SOLUTION INTRAMUSCULAR; INTRAVENOUS; SUBCUTANEOUS at 06:09

## 2024-09-28 RX ADMIN — GABAPENTIN 300 MG: 300 CAPSULE ORAL at 09:09

## 2024-09-28 RX ADMIN — ACETAMINOPHEN 1000 MG: 500 TABLET ORAL at 09:09

## 2024-09-28 RX ADMIN — MAGNESIUM SULFATE HEPTAHYDRATE 2 G: 40 INJECTION, SOLUTION INTRAVENOUS at 11:09

## 2024-09-28 RX ADMIN — ENOXAPARIN SODIUM 70 MG: 80 INJECTION SUBCUTANEOUS at 09:09

## 2024-09-28 RX ADMIN — AMLODIPINE BESYLATE 10 MG: 10 TABLET ORAL at 08:09

## 2024-09-28 RX ADMIN — DEXTROSE MONOHYDRATE AND SODIUM CHLORIDE: 5; .45 INJECTION, SOLUTION INTRAVENOUS at 02:09

## 2024-09-28 RX ADMIN — HYDROMORPHONE HYDROCHLORIDE 2 MG: 1 INJECTION, SOLUTION INTRAMUSCULAR; INTRAVENOUS; SUBCUTANEOUS at 01:09

## 2024-09-28 RX ADMIN — GABAPENTIN 300 MG: 300 CAPSULE ORAL at 08:09

## 2024-09-28 RX ADMIN — FLUOXETINE HYDROCHLORIDE 40 MG: 20 CAPSULE ORAL at 08:09

## 2024-09-28 RX ADMIN — FOLIC ACID 1 MG: 1 TABLET ORAL at 08:09

## 2024-09-28 RX ADMIN — HYDROMORPHONE HYDROCHLORIDE 0.5 MG: 1 INJECTION, SOLUTION INTRAMUSCULAR; INTRAVENOUS; SUBCUTANEOUS at 01:09

## 2024-09-28 RX ADMIN — HYDROMORPHONE HYDROCHLORIDE: 10 INJECTION, SOLUTION INTRAMUSCULAR; INTRAVENOUS; SUBCUTANEOUS at 04:09

## 2024-09-28 RX ADMIN — METHOCARBAMOL 750 MG: 750 TABLET ORAL at 08:09

## 2024-09-28 RX ADMIN — METHOCARBAMOL 750 MG: 750 TABLET ORAL at 01:09

## 2024-09-28 RX ADMIN — SENNOSIDES AND DOCUSATE SODIUM 1 TABLET: 50; 8.6 TABLET ORAL at 08:09

## 2024-09-28 RX ADMIN — GABAPENTIN 300 MG: 300 CAPSULE ORAL at 02:09

## 2024-09-28 RX ADMIN — DEXTROSE MONOHYDRATE AND SODIUM CHLORIDE: 5; .45 INJECTION, SOLUTION INTRAVENOUS at 11:09

## 2024-09-28 RX ADMIN — MUPIROCIN: 20 OINTMENT TOPICAL at 09:09

## 2024-09-28 NOTE — PROGRESS NOTES
[facility] Medicine  Progress Note    Patient Name: Nazanin Malone  MRN: [id number]  Patient Class: IP- Inpatient   Admission Date: 9/23/2024  Length of Stay: 5 days  Attending Physician: Maria Antonia Moore MD  Primary Care Provider: ePe Montgomery MD        Subjective:     Principal Problem:Sickle-cell disease with pain        HPI:  Nazanin Malone is a 44 y.o. female with a hx of HTN, depression, asthma, sickle cell-beta thalassemia, PE, and carotid thrombosis presenting to the ED for chest pain and SOB. Patient says that yesterday morning she woke up with c/o of chest pain and SOB with pain being at 5/10. She took her daily medications and tried to rest but, there was no relief from the pain. Patient then decided to come to the ER for help as she has had acute chest syndrome before and thought that she may be experiencing it again. Patient was in the hospital last week for sickle cell pain crisis in the setting of small L medial malleolus avulsion and has been in and out of the hospital since June due to her sickle cell.    In the ED, patient was given a bolus of LR and received a total of 5mg of dilaudid for pain. Troponin negative at 0.006. D-dimer of 1.48 which appears to be baseline for patient as she takes lovenox at home. Reticulocytes are normal and not consistent with aplastic anemia. CXR showed no acute findings suggestive of acute chest syndrome.    Overview/Hospital Course:  Patient admitted for sickle cell pain crisis management, on 9/27 agreed to transition from multimodal po pain management to PCA pump, has been doing better.       Interval History: Refers she is doing much better. Pain is still 7/10, woke up twice overnight. Had bowel movements yesterday and states she has been eating and drinking. No overnight events as per nurse in charge. Doing good with PCA pump, says it has improved her pain a lot. At bedside asks me to increase her PCA pump dose.     Review of  Systems  Objective:     Vital Signs (Most Recent):  Temp: 98.7 °F (37.1 °C) (09/28/24 1119)  Pulse: 87 (09/28/24 1119)  Resp: 18 (09/28/24 1119)  BP: 118/72 (09/28/24 1119)  SpO2: 98 % (09/28/24 1119) Vital Signs (24h Range):  Temp:  [97.8 °F (36.6 °C)-98.8 °F (37.1 °C)] 98.7 °F (37.1 °C)  Pulse:  [68-90] 87  Resp:  [12-18] 18  SpO2:  [96 %-100 %] 98 %  BP: (113-139)/(58-86) 118/72     Weight: 104.1 kg (229 lb 8 oz)  Body mass index is 41.98 kg/m².  No intake or output data in the 24 hours ending 09/28/24 1143      Physical Exam      Constitutional:       General: She is not in acute distress.     Appearance: Normal appearance. She is not ill-appearing.   HENT:      Head: Normocephalic.      Nose: Nose normal.   Eyes:      Extraocular Movements: Extraocular movements intact.   Cardiovascular:      Rate and Rhythm: Normal rate and regular rhythm.      Pulses: Normal pulses.      Heart sounds: Murmur heard. Holosystolic 2/6 murmur heard at LLSB     No friction rub. No gallop.   Pulmonary:      Effort: Pulmonary effort is normal. No respiratory distress.      Breath sounds: Normal breath sounds. No wheezing or rales.   Abdominal:      General: Bowel sounds are normal. There is no distension.      Palpations: Abdomen is soft.      Tenderness: There is no abdominal tenderness. There is no guarding.   Musculoskeletal:         General: Deformity present. R Leg injury. L leg avulsion on middle medial legNo swelling. Normal range of motion.      Cervical back: Normal range of motion. No rigidity or tenderness.   Skin:     General: Skin is warm.   Neurological:      General: No focal deficit present.      Mental Status: She is alert and oriented to person, place, and time.   Psychiatric:         Mood and Affect: Mood normal.         Behavior: Behavior normal.      Significant Labs: All pertinent labs within the past 24 hours have been reviewed.    Significant Imaging: I have reviewed all pertinent imaging results/findings  within the past 24 hours.    Assessment/Plan:      * Sickle-cell disease with pain  Nazanin Malone is a 44 y.o. female with a hx of HTN, PE, and sickle cell disease presenting to the ED for chest pain. Patient says that the chest pain started yesterday morning and she had no relief. She has be in and out of the hospital for sickle cell since June of this year. At the moment with breakthrough pain.     Plan:   -PCA pump started 0.4mg/hr. Bolus 0.2 mg  - keep patient hydrated with IV fluids   O2 2LNC   - use incentive spirometer to reduce incidence of acute chest syndrome  - continue VTE regimen  - take folic acid daily   - daily CBC  - benadryl po prn for itching related to dilaudid  -Will refer to Heme Onc and Pain Medicine outpatient.       Constipation  Plan:  - patient should continue to take lactulose  - continue to take senna-docusate prn  - have patient ambulate as tolerated      Thrombosis of internal carotid, left  Plan:   - patient is in more hypercoagulable state  - hx of PE  - continue lovenox  - monitor for s/s of PE and DVT      Morbid obesity  Body mass index is 44.08 kg/m². Morbid obesity complicates all aspects of disease management from diagnostic modalities to treatment. Weight loss encouraged and health benefits explained to patient.         Anxiety  Plan:   - denies thoughts of hurting self or others  - denies depression  - continue to take fluoxetine 40mg daily      Trigeminal neuralgia  Plan:   - continue gabapentin 400mg BID      Hypertension  Plan:   - patient hypertensive on admission  - continue to monitor  - continue amlodipine 10mg daily  - continue carvedilol 25mg BID      VTE Risk Mitigation (From admission, onward)           Ordered     enoxaparin injection 70 mg  Every 12 hours         09/23/24 0447     IP VTE HIGH RISK PATIENT  Once         09/23/24 0443     Place sequential compression device  Until discontinued         09/23/24 0443                    Discharge Planning   ALYSE:  9/30/2024     Code Status: Full Code   Is the patient medically ready for discharge?:     Reason for patient still in hospital (select all that apply): Patient trending condition  Discharge Plan A: Home                  Polo Fuller MD  Department of Hospital Medicine   [facility]

## 2024-09-28 NOTE — PROCEDURES
RAPID RESPONSE VASCULAR ACCESS NOTE       Bed:630/630 A    Single lumen 20G, 10CM midline placed in the left cephalic vein. Needle advanced into the vessel under real time ultrasound guidance.    Attempts: 1  Max dwell date: 10/27/24  Lot number: [id number]    Call [phone] for concerns or assistance.    Adam Hanks RN

## 2024-09-28 NOTE — SUBJECTIVE & OBJECTIVE
Interval History: Refers she is doing much better. Pain is still 7/10, woke up twice overnight. Had bowel movements yesterday and states she has been eating and drinking. No overnight events as per nurse in charge. Doing good with PCA pump, says it has improved her pain a lot. At bedside asks me to increase her PCA pump dose.     Review of Systems  Objective:     Vital Signs (Most Recent):  Temp: 98.7 °F (37.1 °C) (09/28/24 1119)  Pulse: 87 (09/28/24 1119)  Resp: 18 (09/28/24 1119)  BP: 118/72 (09/28/24 1119)  SpO2: 98 % (09/28/24 1119) Vital Signs (24h Range):  Temp:  [97.8 °F (36.6 °C)-98.8 °F (37.1 °C)] 98.7 °F (37.1 °C)  Pulse:  [68-90] 87  Resp:  [12-18] 18  SpO2:  [96 %-100 %] 98 %  BP: (113-139)/(58-86) 118/72     Weight: 104.1 kg (229 lb 8 oz)  Body mass index is 41.98 kg/m².  No intake or output data in the 24 hours ending 09/28/24 1143      Physical Exam      Constitutional:       General: She is not in acute distress.     Appearance: Normal appearance. She is not ill-appearing.   HENT:      Head: Normocephalic.      Nose: Nose normal.   Eyes:      Extraocular Movements: Extraocular movements intact.   Cardiovascular:      Rate and Rhythm: Normal rate and regular rhythm.      Pulses: Normal pulses.      Heart sounds: Murmur heard. Holosystolic 2/6 murmur heard at LLSB     No friction rub. No gallop.   Pulmonary:      Effort: Pulmonary effort is normal. No respiratory distress.      Breath sounds: Normal breath sounds. No wheezing or rales.   Abdominal:      General: Bowel sounds are normal. There is no distension.      Palpations: Abdomen is soft.      Tenderness: There is no abdominal tenderness. There is no guarding.   Musculoskeletal:         General: Deformity present. R Leg injury. L leg avulsion on middle medial legNo swelling. Normal range of motion.      Cervical back: Normal range of motion. No rigidity or tenderness.   Skin:     General: Skin is warm.   Neurological:      General: No focal  deficit present.      Mental Status: She is alert and oriented to person, place, and time.   Psychiatric:         Mood and Affect: Mood normal.         Behavior: Behavior normal.      Significant Labs: All pertinent labs within the past 24 hours have been reviewed.    Significant Imaging: I have reviewed all pertinent imaging results/findings within the past 24 hours.

## 2024-09-28 NOTE — ASSESSMENT & PLAN NOTE
Nazanin Malone is a 44 y.o. female with a hx of HTN, PE, and sickle cell disease presenting to the ED for chest pain. Patient says that the chest pain started yesterday morning and she had no relief. She has be in and out of the hospital for sickle cell since June of this year. At the moment with breakthrough pain.     Plan:   -PCA pump started 0.4mg/hr. Bolus 0.2 mg  - keep patient hydrated with IV fluids   O2 2LNC   - use incentive spirometer to reduce incidence of acute chest syndrome  - continue VTE regimen  - take folic acid daily   - daily CBC  - benadryl po prn for itching related to dilaudid  -Will refer to Heme Onc and Pain Medicine outpatient.

## 2024-09-28 NOTE — PLAN OF CARE
Went over plan of care - all questions answered. Meds per md order. Pain continues to be a 7 still. No falls or injuries. Will continue to monitor

## 2024-09-29 LAB
ALBUMIN SERPL BCP-MCNC: 3 G/DL (ref 3.5–5.2)
ALP SERPL-CCNC: 59 U/L (ref 55–135)
ALT SERPL W/O P-5'-P-CCNC: 9 U/L (ref 10–44)
ANION GAP SERPL CALC-SCNC: 3 MMOL/L (ref 8–16)
AST SERPL-CCNC: 15 U/L (ref 10–40)
BASOPHILS # BLD AUTO: 0.06 K/UL (ref 0–0.2)
BASOPHILS NFR BLD: 0.9 % (ref 0–1.9)
BILIRUB SERPL-MCNC: 0.3 MG/DL (ref 0.1–1)
BUN SERPL-MCNC: 12 MG/DL (ref 6–20)
CALCIUM SERPL-MCNC: 8.5 MG/DL (ref 8.7–10.5)
CHLORIDE SERPL-SCNC: 112 MMOL/L (ref 95–110)
CO2 SERPL-SCNC: 24 MMOL/L (ref 23–29)
CREAT SERPL-MCNC: 0.9 MG/DL (ref 0.5–1.4)
DIFFERENTIAL METHOD BLD: ABNORMAL
EOSINOPHIL # BLD AUTO: 0.4 K/UL (ref 0–0.5)
EOSINOPHIL NFR BLD: 6.4 % (ref 0–8)
ERYTHROCYTE [DISTWIDTH] IN BLOOD BY AUTOMATED COUNT: 19.8 % (ref 11.5–14.5)
EST. GFR  (NO RACE VARIABLE): >60 ML/MIN/1.73 M^2
GLUCOSE SERPL-MCNC: 88 MG/DL (ref 70–110)
HCT VFR BLD AUTO: 22.4 % (ref 37–48.5)
HGB BLD-MCNC: 7.3 G/DL (ref 12–16)
IMM GRANULOCYTES # BLD AUTO: 0.01 K/UL (ref 0–0.04)
IMM GRANULOCYTES NFR BLD AUTO: 0.2 % (ref 0–0.5)
LYMPHOCYTES # BLD AUTO: 3 K/UL (ref 1–4.8)
LYMPHOCYTES NFR BLD: 46.7 % (ref 18–48)
MAGNESIUM SERPL-MCNC: 2 MG/DL (ref 1.6–2.6)
MCH RBC QN AUTO: 24.5 PG (ref 27–31)
MCHC RBC AUTO-ENTMCNC: 32.6 G/DL (ref 32–36)
MCV RBC AUTO: 75 FL (ref 82–98)
MONOCYTES # BLD AUTO: 1 K/UL (ref 0.3–1)
MONOCYTES NFR BLD: 15.7 % (ref 4–15)
NEUTROPHILS # BLD AUTO: 1.9 K/UL (ref 1.8–7.7)
NEUTROPHILS NFR BLD: 30.1 % (ref 38–73)
NRBC BLD-RTO: 1 /100 WBC
PHOSPHATE SERPL-MCNC: 2.5 MG/DL (ref 2.7–4.5)
PLATELET # BLD AUTO: 428 K/UL (ref 150–450)
PMV BLD AUTO: 9.5 FL (ref 9.2–12.9)
POTASSIUM SERPL-SCNC: 3.7 MMOL/L (ref 3.5–5.1)
PROT SERPL-MCNC: 6 G/DL (ref 6–8.4)
RBC # BLD AUTO: 2.98 M/UL (ref 4–5.4)
SODIUM SERPL-SCNC: 139 MMOL/L (ref 136–145)
WBC # BLD AUTO: 6.42 K/UL (ref 3.9–12.7)

## 2024-09-29 PROCEDURE — 80053 COMPREHEN METABOLIC PANEL: CPT

## 2024-09-29 PROCEDURE — 25000003 PHARM REV CODE 250

## 2024-09-29 PROCEDURE — 84100 ASSAY OF PHOSPHORUS: CPT

## 2024-09-29 PROCEDURE — 83735 ASSAY OF MAGNESIUM: CPT

## 2024-09-29 PROCEDURE — 63600175 PHARM REV CODE 636 W HCPCS

## 2024-09-29 PROCEDURE — 85025 COMPLETE CBC W/AUTO DIFF WBC: CPT

## 2024-09-29 PROCEDURE — 21400001 HC TELEMETRY ROOM

## 2024-09-29 PROCEDURE — 94761 N-INVAS EAR/PLS OXIMETRY MLT: CPT

## 2024-09-29 PROCEDURE — 25000003 PHARM REV CODE 250: Performed by: INTERNAL MEDICINE

## 2024-09-29 PROCEDURE — S5010 5% DEXTROSE AND 0.45% SALINE: HCPCS

## 2024-09-29 RX ORDER — OXYCODONE HYDROCHLORIDE 10 MG/1
10 TABLET ORAL EVERY 6 HOURS PRN
Status: DISCONTINUED | OUTPATIENT
Start: 2024-09-29 | End: 2024-10-01

## 2024-09-29 RX ORDER — NALOXONE HCL 0.4 MG/ML
0.4 VIAL (ML) INJECTION
Status: DISCONTINUED | OUTPATIENT
Start: 2024-09-29 | End: 2024-10-02 | Stop reason: HOSPADM

## 2024-09-29 RX ORDER — HYDROMORPHONE HYDROCHLORIDE 1 MG/ML
3 INJECTION, SOLUTION INTRAMUSCULAR; INTRAVENOUS; SUBCUTANEOUS
Status: DISCONTINUED | OUTPATIENT
Start: 2024-09-29 | End: 2024-10-01

## 2024-09-29 RX ORDER — SODIUM,POTASSIUM PHOSPHATES 280-250MG
2 POWDER IN PACKET (EA) ORAL EVERY 4 HOURS
Status: COMPLETED | OUTPATIENT
Start: 2024-09-29 | End: 2024-09-29

## 2024-09-29 RX ORDER — HYDROMORPHONE HYDROCHLORIDE 1 MG/ML
3 INJECTION, SOLUTION INTRAMUSCULAR; INTRAVENOUS; SUBCUTANEOUS EVERY 6 HOURS PRN
Status: DISCONTINUED | OUTPATIENT
Start: 2024-09-29 | End: 2024-09-29

## 2024-09-29 RX ORDER — POTASSIUM CHLORIDE 20 MEQ/1
40 TABLET, EXTENDED RELEASE ORAL ONCE
Status: COMPLETED | OUTPATIENT
Start: 2024-09-29 | End: 2024-09-29

## 2024-09-29 RX ORDER — MORPHINE SULFATE 30 MG/1
30 TABLET, FILM COATED, EXTENDED RELEASE ORAL EVERY 12 HOURS
Status: DISCONTINUED | OUTPATIENT
Start: 2024-09-29 | End: 2024-10-02 | Stop reason: HOSPADM

## 2024-09-29 RX ADMIN — POTASSIUM CHLORIDE 40 MEQ: 1500 TABLET, EXTENDED RELEASE ORAL at 09:09

## 2024-09-29 RX ADMIN — FOLIC ACID 1 MG: 1 TABLET ORAL at 09:09

## 2024-09-29 RX ADMIN — CARVEDILOL 25 MG: 25 TABLET, FILM COATED ORAL at 08:09

## 2024-09-29 RX ADMIN — FLUTICASONE PROPIONATE 100 MCG: 50 SPRAY, METERED NASAL at 09:09

## 2024-09-29 RX ADMIN — POTASSIUM & SODIUM PHOSPHATES POWDER PACK 280-160-250 MG 2 PACKET: 280-160-250 PACK at 01:09

## 2024-09-29 RX ADMIN — HYDROMORPHONE HYDROCHLORIDE 3 MG: 1 INJECTION, SOLUTION INTRAMUSCULAR; INTRAVENOUS; SUBCUTANEOUS at 08:09

## 2024-09-29 RX ADMIN — HYDROMORPHONE HYDROCHLORIDE 3 MG: 1 INJECTION, SOLUTION INTRAMUSCULAR; INTRAVENOUS; SUBCUTANEOUS at 05:09

## 2024-09-29 RX ADMIN — CARVEDILOL 25 MG: 25 TABLET, FILM COATED ORAL at 09:09

## 2024-09-29 RX ADMIN — METHOCARBAMOL 750 MG: 750 TABLET ORAL at 05:09

## 2024-09-29 RX ADMIN — DIPHENHYDRAMINE HYDROCHLORIDE 25 MG: 25 CAPSULE ORAL at 09:09

## 2024-09-29 RX ADMIN — ENOXAPARIN SODIUM 60 MG: 60 INJECTION SUBCUTANEOUS at 01:09

## 2024-09-29 RX ADMIN — POTASSIUM & SODIUM PHOSPHATES POWDER PACK 280-160-250 MG 2 PACKET: 280-160-250 PACK at 09:09

## 2024-09-29 RX ADMIN — GABAPENTIN 300 MG: 300 CAPSULE ORAL at 09:09

## 2024-09-29 RX ADMIN — DEXTROSE MONOHYDRATE AND SODIUM CHLORIDE: 5; .45 INJECTION, SOLUTION INTRAVENOUS at 09:09

## 2024-09-29 RX ADMIN — HYDROMORPHONE HYDROCHLORIDE: 10 INJECTION, SOLUTION INTRAMUSCULAR; INTRAVENOUS; SUBCUTANEOUS at 04:09

## 2024-09-29 RX ADMIN — SENNOSIDES AND DOCUSATE SODIUM 1 TABLET: 50; 8.6 TABLET ORAL at 09:09

## 2024-09-29 RX ADMIN — OXYCODONE HYDROCHLORIDE 15 MG: 10 TABLET ORAL at 01:09

## 2024-09-29 RX ADMIN — MORPHINE SULFATE 30 MG: 30 TABLET, FILM COATED, EXTENDED RELEASE ORAL at 08:09

## 2024-09-29 RX ADMIN — GABAPENTIN 300 MG: 300 CAPSULE ORAL at 05:09

## 2024-09-29 RX ADMIN — MUPIROCIN: 20 OINTMENT TOPICAL at 09:09

## 2024-09-29 RX ADMIN — AMLODIPINE BESYLATE 10 MG: 10 TABLET ORAL at 09:09

## 2024-09-29 RX ADMIN — HYDROMORPHONE HYDROCHLORIDE 3 MG: 1 INJECTION, SOLUTION INTRAMUSCULAR; INTRAVENOUS; SUBCUTANEOUS at 02:09

## 2024-09-29 RX ADMIN — FLUOXETINE HYDROCHLORIDE 40 MG: 20 CAPSULE ORAL at 09:09

## 2024-09-29 RX ADMIN — GABAPENTIN 300 MG: 300 CAPSULE ORAL at 08:09

## 2024-09-29 RX ADMIN — METHOCARBAMOL 750 MG: 750 TABLET ORAL at 01:09

## 2024-09-29 RX ADMIN — ACETAMINOPHEN 1000 MG: 500 TABLET ORAL at 01:09

## 2024-09-29 RX ADMIN — METHOCARBAMOL 750 MG: 750 TABLET ORAL at 08:09

## 2024-09-29 RX ADMIN — ACETAMINOPHEN 1000 MG: 500 TABLET ORAL at 05:09

## 2024-09-29 RX ADMIN — OXYCODONE HYDROCHLORIDE 15 MG: 10 TABLET ORAL at 08:09

## 2024-09-29 RX ADMIN — METHOCARBAMOL 750 MG: 750 TABLET ORAL at 09:09

## 2024-09-29 RX ADMIN — MUPIROCIN: 20 OINTMENT TOPICAL at 08:09

## 2024-09-29 RX ADMIN — ACETAMINOPHEN 1000 MG: 500 TABLET ORAL at 09:09

## 2024-09-29 NOTE — SUBJECTIVE & OBJECTIVE
Interval History: No overnight events. Patient still complaining of pain 10/10. States she woke up 3 times overnight and says the pain isnt controlled until she gets at least 4 pushes which that takes around 24 minutes due to lock-out periods. Had been eating and drinking small amounts, had BM 2 days ago. Hasn't had vomiting episodes.     Review of Systems  Objective:     Vital Signs (Most Recent):  Temp: 98.7 °F (37.1 °C) (09/29/24 0742)  Pulse: 78 (09/29/24 0742)  Resp: (!) 8 (09/29/24 0742)  BP: 113/73 (09/29/24 0742)  SpO2:  (pt didn't want to be disturbed) (09/29/24 1043) Vital Signs (24h Range):  Temp:  [98.6 °F (37 °C)-99 °F (37.2 °C)] 98.7 °F (37.1 °C)  Pulse:  [78-87] 78  Resp:  [8-18] 8  SpO2:  [96 %-100 %] 96 %  BP: (104-118)/(63-73) 113/73     Weight: 104.1 kg (229 lb 8 oz)  Body mass index is 41.98 kg/m².  No intake or output data in the 24 hours ending 09/29/24 1117      Physical Exam  Constitutional:       Appearance: She is obese.   HENT:      Head: Normocephalic.      Right Ear: Tympanic membrane and ear canal normal.      Left Ear: Tympanic membrane and ear canal normal.      Nose: Nose normal.      Mouth/Throat:      Mouth: Mucous membranes are moist.      Pharynx: Oropharynx is clear.   Eyes:      Extraocular Movements: Extraocular movements intact.      Conjunctiva/sclera: Conjunctivae normal.      Pupils: Pupils are equal, round, and reactive to light.   Cardiovascular:      Rate and Rhythm: Normal rate and regular rhythm.      Pulses: Normal pulses.      Heart sounds: Normal heart sounds.   Pulmonary:      Effort: Pulmonary effort is normal.      Breath sounds: Normal breath sounds.   Abdominal:      General: Bowel sounds are normal. There is no distension.      Palpations: Abdomen is soft. There is no mass.   Musculoskeletal:         General: No swelling or tenderness. Normal range of motion.      Cervical back: Normal range of motion and neck supple.   Skin:     General: Skin is warm and  dry.      Capillary Refill: Capillary refill takes less than 2 seconds.      Coloration: Skin is not jaundiced or pale.   Neurological:      General: No focal deficit present.      Mental Status: She is alert and oriented to person, place, and time.      Cranial Nerves: No cranial nerve deficit.      Sensory: No sensory deficit.      Motor: No weakness.   Psychiatric:      Comments: Mood: sad              Significant Labs: All pertinent labs within the past 24 hours have been reviewed.    Significant Imaging: I have reviewed all pertinent imaging results/findings within the past 24 hours.

## 2024-09-29 NOTE — SUBJECTIVE & OBJECTIVE
Interval History: DASH BARRIOS. Pt stating that her pain is improving, but wants another day with the same regimen and requested hydroxyzine and promethazine for itchiness.     Review of Systems  Objective:     Vital Signs (Most Recent):  Temp: 99.3 °F (37.4 °C) (09/29/24 1202)  Pulse: 83 (09/29/24 1202)  Resp: 18 (09/29/24 1202)  BP: 101/63 (09/29/24 1202)  SpO2: 99 % (09/29/24 1202) Vital Signs (24h Range):  Temp:  [98.6 °F (37 °C)-99.3 °F (37.4 °C)] 99.3 °F (37.4 °C)  Pulse:  [78-85] 83  Resp:  [8-18] 18  SpO2:  [96 %-100 %] 99 %  BP: (101-118)/(63-73) 101/63     Weight: 104.1 kg (229 lb 8 oz)  Body mass index is 41.98 kg/m².  No intake or output data in the 24 hours ending 09/29/24 1300      Physical Exam  Constitutional:       Appearance: She is obese.   HENT:      Head: Normocephalic.      Right Ear: Tympanic membrane and ear canal normal.      Left Ear: Tympanic membrane and ear canal normal.      Nose: Nose normal.      Mouth/Throat:      Mouth: Mucous membranes are moist.      Pharynx: Oropharynx is clear.   Eyes:      Extraocular Movements: Extraocular movements intact.      Conjunctiva/sclera: Conjunctivae normal.      Pupils: Pupils are equal, round, and reactive to light.   Cardiovascular:      Rate and Rhythm: Normal rate and regular rhythm.      Pulses: Normal pulses.      Heart sounds: Normal heart sounds.   Pulmonary:      Effort: Pulmonary effort is normal.      Breath sounds: Normal breath sounds.   Abdominal:      General: Bowel sounds are normal. There is no distension.      Palpations: Abdomen is soft. There is no mass.   Musculoskeletal:         General: No swelling or tenderness. Normal range of motion.      Cervical back: Normal range of motion and neck supple.   Skin:     General: Skin is warm and dry.      Capillary Refill: Capillary refill takes less than 2 seconds.      Coloration: Skin is not jaundiced or pale.   Neurological:      General: No focal deficit present.      Mental Status: She  is alert and oriented to person, place, and time.      Cranial Nerves: No cranial nerve deficit.      Sensory: No sensory deficit.      Motor: No weakness.   Psychiatric:      Comments: Mood: sad          Significant Labs: All pertinent labs within the past 24 hours have been reviewed.    Significant Imaging: I have reviewed all pertinent imaging results/findings within the past 24 hours.

## 2024-09-29 NOTE — ASSESSMENT & PLAN NOTE
Nazanin Malone is a 44 y.o. female with a hx of HTN, PE, and sickle cell disease presenting to the ED for chest pain. Patient says that the chest pain started yesterday morning and she had no relief. She has be in and out of the hospital for sickle cell since June of this year. At the moment with breakthrough pain.     Plan:   -Continue multimodal pain mgmt ALL PRN : dilaudid 3mg q3hs, Oxy 15 q6HS , Oxy 10 q6hs. Plans to wean off dilaudid today to 2mg q3hrs  - keep patient hydrated with IV fluids  - use incentive spirometer to reduce incidence of acute chest syndrome  - continue VTE regimen  - take folic acid daily   - daily CBC  - benadryl po prn for itching related to dilaudid  -Will refer to Heme Onc and Pain Medicine outpatient.

## 2024-09-29 NOTE — ASSESSMENT & PLAN NOTE
Nazanin Malone is a 44 y.o. female with a hx of HTN, PE, and sickle cell disease presenting to the ED for chest pain. Patient says that the chest pain started yesterday morning and she had no relief. She has be in and out of the hospital for sickle cell since June of this year. At the moment with breakthrough pain.     Plan:   -PCA pump started 0.4mg/hr. Bolus 0.2 mg. Planning on increasing Bolus dose and decreasing maintenance dose.  - keep patient hydrated with IV fluids  - use incentive spirometer to reduce incidence of acute chest syndrome  - continue VTE regimen  - take folic acid daily   - daily CBC  - benadryl po prn for itching related to dilaudid  -Will refer to Heme Onc and Pain Medicine outpatient.

## 2024-09-29 NOTE — ASSESSMENT & PLAN NOTE
Plan:   - patient is in more hypercoagulable state  - hx of PE  - continue enoxaparin [id number]  - monitor for s/s of PE and DVT

## 2024-09-29 NOTE — PLAN OF CARE
Problem: Adult Inpatient Plan of Care  Goal: Plan of Care Review  Outcome: Progressing  Goal: Patient-Specific Goal (Individualized)  Outcome: Progressing  Goal: Absence of Hospital-Acquired Illness or Injury  Outcome: Progressing  Goal: Optimal Comfort and Wellbeing  Outcome: Progressing  Goal: Readiness for Transition of Care  Outcome: Progressing     Problem: Bariatric Environmental Safety  Goal: Safety Maintained with Care  Outcome: Progressing     Problem: Acute Kidney Injury/Impairment  Goal: Fluid and Electrolyte Balance  Outcome: Progressing  Goal: Improved Oral Intake  Outcome: Progressing  Goal: Effective Renal Function  Outcome: Progressing     Problem: Pneumonia  Goal: Fluid Balance  Outcome: Progressing  Goal: Resolution of Infection Signs and Symptoms  Outcome: Progressing  Goal: Effective Oxygenation and Ventilation  Outcome: Progressing     Problem: Infection  Goal: Absence of Infection Signs and Symptoms  Outcome: Progressing     Problem: Pain Acute  Goal: Optimal Pain Control and Function  Outcome: Progressing     Problem: Comorbidity Management  Goal: Maintenance of COPD Symptom Control  Outcome: Progressing     Problem: Anxiety  Goal: Anxiety Reduction or Resolution  Outcome: Progressing     Problem: Constipation  Goal: Effective Bowel Elimination  Outcome: Progressing     Problem: VTE (Venous Thromboembolism)  Goal: Tissue Perfusion  Outcome: Progressing  Goal: Right Ventricular Function  Outcome: Progressing       Pt AAO x 4. Patient rested comfortably with no significant changes during the shift, remains free from falls and injuries. Side rails up x2, bed low and locked, call light and personal belongings within reach. VS remain stable and respirations even and unlabored. Hourly rounding to ensure safety and comfort. No grimace, cries or other signs of acute distress. Questions and concerns addressed and answered. Medication compliance. PRN analgesic Oxycodone and Dilaudid given per order.    Reviewed importance of safety barriers and calling for assistance prn. Verbalized understanding and states she will call prn for needs. Plan of care discussed.

## 2024-09-29 NOTE — PLAN OF CARE
Problem: Adult Inpatient Plan of Care  Goal: Plan of Care Review  Outcome: Progressing  Goal: Patient-Specific Goal (Individualized)  Outcome: Progressing  Goal: Absence of Hospital-Acquired Illness or Injury  Outcome: Progressing  Goal: Optimal Comfort and Wellbeing  Outcome: Progressing  Goal: Readiness for Transition of Care  Outcome: Progressing     Problem: Bariatric Environmental Safety  Goal: Safety Maintained with Care  Outcome: Progressing     Problem: Acute Kidney Injury/Impairment  Goal: Fluid and Electrolyte Balance  Outcome: Progressing  Goal: Improved Oral Intake  Outcome: Progressing  Goal: Effective Renal Function  Outcome: Progressing     Problem: Pneumonia  Goal: Fluid Balance  Outcome: Progressing  Goal: Resolution of Infection Signs and Symptoms  Outcome: Progressing  Goal: Effective Oxygenation and Ventilation  Outcome: Progressing     Problem: Infection  Goal: Absence of Infection Signs and Symptoms  Outcome: Progressing     Problem: Pain Acute  Goal: Optimal Pain Control and Function  Outcome: Progressing     Problem: Comorbidity Management  Goal: Maintenance of COPD Symptom Control  Outcome: Progressing     Problem: Anxiety  Goal: Anxiety Reduction or Resolution  Outcome: Progressing     Problem: Constipation  Goal: Effective Bowel Elimination  Outcome: Progressing     Problem: VTE (Venous Thromboembolism)  Goal: Tissue Perfusion  Outcome: Progressing  Goal: Right Ventricular Function  Outcome: Progressing

## 2024-09-29 NOTE — PROGRESS NOTES
[facility]  Progress Note    Patient Name: Nazanin Malone  MRN: [id number]  Patient Class: IP- Inpatient   Admission Date: 9/23/2024  Length of Stay: 6 days  Attending Physician: Yeison Steward MD  Primary Care Provider: Pee Montgomery MD        Subjective:     Principal Problem:Sickle-cell disease with pain        HPI:  Nazanin Malone is a 44 y.o. female with a hx of HTN, depression, asthma, sickle cell-beta thalassemia, PE, and carotid thrombosis presenting to the ED for chest pain and SOB. Patient says that yesterday morning she woke up with c/o of chest pain and SOB with pain being at 5/10. She took her daily medications and tried to rest but, there was no relief from the pain. Patient then decided to come to the ER for help as she has had acute chest syndrome before and thought that she may be experiencing it again. Patient was in the hospital last week for sickle cell pain crisis in the setting of small L medial malleolus avulsion and has been in and out of the hospital since June due to her sickle cell.    In the ED, patient was given a bolus of LR and received a total of 5mg of dilaudid for pain. Troponin negative at 0.006. D-dimer of 1.48 which appears to be baseline for patient as she takes lovenox at home. Reticulocytes are normal and not consistent with aplastic anemia. CXR showed no acute findings suggestive of acute chest syndrome.    Overview/Hospital Course:  Patient admitted for sickle cell pain crisis management, initially on multimodal pain mgmt then PCA pump, now on multimodal pain management again starting Dilaudid 3mg q3hs, plans to wean off.        Interval History: No overnight events. Patient still complaining of pain 10/10. States she woke up 3 times overnight and says the pain isnt controlled until she gets at least 4 pushes which that takes around 24 minutes due to lock-out periods. Had been eating and drinking small amounts, had BM 2 days ago.  Hasn't had vomiting episodes.     Review of Systems  Objective:     Vital Signs (Most Recent):  Temp: 99.3 °F (37.4 °C) (09/29/24 1202)  Pulse: 83 (09/29/24 1202)  Resp: 18 (09/29/24 1202)  BP: 101/63 (09/29/24 1202)  SpO2: 99 % (09/29/24 1202) Vital Signs (24h Range):  Temp:  [98.6 °F (37 °C)-99.3 °F (37.4 °C)] 99.3 °F (37.4 °C)  Pulse:  [78-85] 83  Resp:  [8-18] 18  SpO2:  [96 %-100 %] 99 %  BP: (101-118)/(63-73) 101/63     Weight: 104.1 kg (229 lb 8 oz)  Body mass index is 41.98 kg/m².  No intake or output data in the 24 hours ending 09/29/24 1300      Physical Exam      Constitutional:       Appearance: She is obese.   HENT:      Head: Normocephalic.      Right Ear: Tympanic membrane and ear canal normal.      Left Ear: Tympanic membrane and ear canal normal.      Nose: Nose normal.      Mouth/Throat:      Mouth: Mucous membranes are moist.      Pharynx: Oropharynx is clear.   Eyes:      Extraocular Movements: Extraocular movements intact.      Conjunctiva/sclera: Conjunctivae normal.      Pupils: Pupils are equal, round, and reactive to light.   Cardiovascular:      Rate and Rhythm: Normal rate and regular rhythm.      Pulses: Normal pulses.      Heart sounds: Normal heart sounds.   Pulmonary:      Effort: Pulmonary effort is normal.      Breath sounds: Normal breath sounds.   Abdominal:      General: Bowel sounds are normal. There is no distension.      Palpations: Abdomen is soft. There is no mass.   Musculoskeletal:         General: No swelling or tenderness. Normal range of motion.      Cervical back: Normal range of motion and neck supple.   Skin:     General: Skin is warm and dry.      Capillary Refill: Capillary refill takes less than 2 seconds.      Coloration: Skin is not jaundiced or pale.   Neurological:      General: No focal deficit present.      Mental Status: She is alert and oriented to person, place, and time.      Cranial Nerves: No cranial nerve deficit.      Sensory: No sensory deficit.       Motor: No weakness.   Psychiatric:      Comments: Mood: sad    Significant Labs: All pertinent labs within the past 24 hours have been reviewed.    Significant Imaging: I have reviewed all pertinent imaging results/findings within the past 24 hours.    Assessment/Plan:      * Sickle-cell disease with pain  Nazanin Malone is a 44 y.o. female with a hx of HTN, PE, and sickle cell disease presenting to the ED for chest pain. Patient says that the chest pain started yesterday morning and she had no relief. She has be in and out of the hospital for sickle cell since June of this year. At the moment with breakthrough pain.     Plan:   -PCA pump started 0.4mg/hr. Bolus 0.2 mg. Planning on increasing Bolus dose and decreasing maintenance dose.  - keep patient hydrated with IV fluids  - use incentive spirometer to reduce incidence of acute chest syndrome  - continue VTE regimen  - take folic acid daily   - daily CBC  - benadryl po prn for itching related to dilaudid  -Will refer to Heme Onc and Pain Medicine outpatient.       Constipation  Plan:  - patient should continue to take lactulose  - continue to take senna-docusate prn  - have patient ambulate as tolerated      Thrombosis of internal carotid, left  Plan:   - patient is in more hypercoagulable state  - hx of PE  - continue enoxaparin [id number]  - monitor for s/s of PE and DVT      Morbid obesity  Body mass index is 44.08 kg/m². Morbid obesity complicates all aspects of disease management from diagnostic modalities to treatment. Weight loss encouraged and health benefits explained to patient.         Anxiety  Plan:   - denies thoughts of hurting self or others  - denies depression  - continue to take fluoxetine 40mg daily      Trigeminal neuralgia  Plan:   - continue gabapentin 300TID      Hypertension  Plan:   - patient hypertensive on admission  - continue to monitor  - continue amlodipine 10mg daily  - continue carvedilol 25mg BID      VTE Risk Mitigation (From  admission, onward)           Ordered     enoxaparin injection 60 mg  Every 12 hours         09/28/24 2332     IP VTE HIGH RISK PATIENT  Once         09/23/24 0443     Place sequential compression device  Until discontinued         09/23/24 0443                    Discharge Planning   ALYSE: 10/2/2024     Code Status: Full Code   Is the patient medically ready for discharge?:     Reason for patient still in hospital (select all that apply): Patient trending condition  Discharge Plan A: Home                  Polo Fuller MD  Department of Hospital Medicine   [facility]

## 2024-09-30 LAB
ALBUMIN SERPL BCP-MCNC: 3.2 G/DL (ref 3.5–5.2)
ALP SERPL-CCNC: 65 U/L (ref 55–135)
ALT SERPL W/O P-5'-P-CCNC: 9 U/L (ref 10–44)
ANION GAP SERPL CALC-SCNC: 5 MMOL/L (ref 8–16)
AST SERPL-CCNC: 16 U/L (ref 10–40)
BASOPHILS # BLD AUTO: 0.06 K/UL (ref 0–0.2)
BASOPHILS NFR BLD: 0.8 % (ref 0–1.9)
BILIRUB SERPL-MCNC: 0.3 MG/DL (ref 0.1–1)
BUN SERPL-MCNC: 11 MG/DL (ref 6–20)
CALCIUM SERPL-MCNC: 8.9 MG/DL (ref 8.7–10.5)
CHLORIDE SERPL-SCNC: 110 MMOL/L (ref 95–110)
CO2 SERPL-SCNC: 25 MMOL/L (ref 23–29)
CREAT SERPL-MCNC: 1.1 MG/DL (ref 0.5–1.4)
DIFFERENTIAL METHOD BLD: ABNORMAL
EOSINOPHIL # BLD AUTO: 0.4 K/UL (ref 0–0.5)
EOSINOPHIL NFR BLD: 5.3 % (ref 0–8)
ERYTHROCYTE [DISTWIDTH] IN BLOOD BY AUTOMATED COUNT: 19.4 % (ref 11.5–14.5)
EST. GFR  (NO RACE VARIABLE): >60 ML/MIN/1.73 M^2
FACT X PPP CHRO-ACNC: 0.86 IU/ML (ref 0.3–0.7)
GLUCOSE SERPL-MCNC: 99 MG/DL (ref 70–110)
HCT VFR BLD AUTO: 23.9 % (ref 37–48.5)
HGB BLD-MCNC: 7.7 G/DL (ref 12–16)
IMM GRANULOCYTES # BLD AUTO: 0.01 K/UL (ref 0–0.04)
IMM GRANULOCYTES NFR BLD AUTO: 0.1 % (ref 0–0.5)
LYMPHOCYTES # BLD AUTO: 3.3 K/UL (ref 1–4.8)
LYMPHOCYTES NFR BLD: 45.5 % (ref 18–48)
MAGNESIUM SERPL-MCNC: 1.9 MG/DL (ref 1.6–2.6)
MCH RBC QN AUTO: 23.8 PG (ref 27–31)
MCHC RBC AUTO-ENTMCNC: 32.2 G/DL (ref 32–36)
MCV RBC AUTO: 74 FL (ref 82–98)
MONOCYTES # BLD AUTO: 0.8 K/UL (ref 0.3–1)
MONOCYTES NFR BLD: 11.7 % (ref 4–15)
NEUTROPHILS # BLD AUTO: 2.6 K/UL (ref 1.8–7.7)
NEUTROPHILS NFR BLD: 36.6 % (ref 38–73)
NRBC BLD-RTO: 1 /100 WBC
PHOSPHATE SERPL-MCNC: 2.9 MG/DL (ref 2.7–4.5)
PLATELET # BLD AUTO: 436 K/UL (ref 150–450)
PMV BLD AUTO: 9.6 FL (ref 9.2–12.9)
POTASSIUM SERPL-SCNC: 4.2 MMOL/L (ref 3.5–5.1)
PROT SERPL-MCNC: 6.7 G/DL (ref 6–8.4)
RBC # BLD AUTO: 3.24 M/UL (ref 4–5.4)
SODIUM SERPL-SCNC: 140 MMOL/L (ref 136–145)
WBC # BLD AUTO: 7.21 K/UL (ref 3.9–12.7)

## 2024-09-30 PROCEDURE — 80053 COMPREHEN METABOLIC PANEL: CPT

## 2024-09-30 PROCEDURE — 25000003 PHARM REV CODE 250: Performed by: INTERNAL MEDICINE

## 2024-09-30 PROCEDURE — 85520 HEPARIN ASSAY: CPT

## 2024-09-30 PROCEDURE — 94761 N-INVAS EAR/PLS OXIMETRY MLT: CPT

## 2024-09-30 PROCEDURE — 25000003 PHARM REV CODE 250

## 2024-09-30 PROCEDURE — 63600175 PHARM REV CODE 636 W HCPCS

## 2024-09-30 PROCEDURE — 21400001 HC TELEMETRY ROOM

## 2024-09-30 PROCEDURE — 84100 ASSAY OF PHOSPHORUS: CPT

## 2024-09-30 PROCEDURE — 85025 COMPLETE CBC W/AUTO DIFF WBC: CPT

## 2024-09-30 PROCEDURE — 83735 ASSAY OF MAGNESIUM: CPT

## 2024-09-30 RX ORDER — HYDROMORPHONE HYDROCHLORIDE 1 MG/ML
1 INJECTION, SOLUTION INTRAMUSCULAR; INTRAVENOUS; SUBCUTANEOUS ONCE AS NEEDED
Status: COMPLETED | OUTPATIENT
Start: 2024-09-30 | End: 2024-09-30

## 2024-09-30 RX ORDER — HYDROXYZINE HYDROCHLORIDE 25 MG/1
25 TABLET, FILM COATED ORAL 3 TIMES DAILY PRN
Status: DISCONTINUED | OUTPATIENT
Start: 2024-09-30 | End: 2024-10-02 | Stop reason: HOSPADM

## 2024-09-30 RX ORDER — HYDROXYZINE HYDROCHLORIDE 25 MG/1
25 TABLET, FILM COATED ORAL 3 TIMES DAILY PRN
Status: DISCONTINUED | OUTPATIENT
Start: 2024-09-30 | End: 2024-09-30

## 2024-09-30 RX ORDER — MAGNESIUM SULFATE HEPTAHYDRATE 40 MG/ML
2 INJECTION, SOLUTION INTRAVENOUS ONCE
Status: COMPLETED | OUTPATIENT
Start: 2024-09-30 | End: 2024-09-30

## 2024-09-30 RX ADMIN — LACTULOSE 20 G: 20 SOLUTION ORAL at 08:09

## 2024-09-30 RX ADMIN — MORPHINE SULFATE 30 MG: 30 TABLET, FILM COATED, EXTENDED RELEASE ORAL at 09:09

## 2024-09-30 RX ADMIN — HYDROMORPHONE HYDROCHLORIDE 3 MG: 1 INJECTION, SOLUTION INTRAMUSCULAR; INTRAVENOUS; SUBCUTANEOUS at 03:09

## 2024-09-30 RX ADMIN — ACETAMINOPHEN 1000 MG: 500 TABLET ORAL at 06:09

## 2024-09-30 RX ADMIN — HYDROMORPHONE HYDROCHLORIDE 1 MG: 1 INJECTION, SOLUTION INTRAMUSCULAR; INTRAVENOUS; SUBCUTANEOUS at 02:09

## 2024-09-30 RX ADMIN — METHOCARBAMOL 750 MG: 750 TABLET ORAL at 04:09

## 2024-09-30 RX ADMIN — OXYCODONE HYDROCHLORIDE 15 MG: 10 TABLET ORAL at 08:09

## 2024-09-30 RX ADMIN — MAGNESIUM SULFATE HEPTAHYDRATE 2 G: 40 INJECTION, SOLUTION INTRAVENOUS at 09:09

## 2024-09-30 RX ADMIN — AMLODIPINE BESYLATE 10 MG: 10 TABLET ORAL at 09:09

## 2024-09-30 RX ADMIN — ENOXAPARIN SODIUM 60 MG: 60 INJECTION SUBCUTANEOUS at 12:09

## 2024-09-30 RX ADMIN — OXYCODONE HYDROCHLORIDE 15 MG: 10 TABLET ORAL at 02:09

## 2024-09-30 RX ADMIN — SENNOSIDES AND DOCUSATE SODIUM 1 TABLET: 50; 8.6 TABLET ORAL at 09:09

## 2024-09-30 RX ADMIN — HYDROMORPHONE HYDROCHLORIDE 3 MG: 1 INJECTION, SOLUTION INTRAMUSCULAR; INTRAVENOUS; SUBCUTANEOUS at 06:09

## 2024-09-30 RX ADMIN — MUPIROCIN: 20 OINTMENT TOPICAL at 10:09

## 2024-09-30 RX ADMIN — METHOCARBAMOL 750 MG: 750 TABLET ORAL at 09:09

## 2024-09-30 RX ADMIN — HYDROMORPHONE HYDROCHLORIDE 3 MG: 1 INJECTION, SOLUTION INTRAMUSCULAR; INTRAVENOUS; SUBCUTANEOUS at 04:09

## 2024-09-30 RX ADMIN — FOLIC ACID 1 MG: 1 TABLET ORAL at 09:09

## 2024-09-30 RX ADMIN — CARVEDILOL 25 MG: 25 TABLET, FILM COATED ORAL at 08:09

## 2024-09-30 RX ADMIN — HYDROMORPHONE HYDROCHLORIDE 3 MG: 1 INJECTION, SOLUTION INTRAMUSCULAR; INTRAVENOUS; SUBCUTANEOUS at 01:09

## 2024-09-30 RX ADMIN — ACETAMINOPHEN 1000 MG: 500 TABLET ORAL at 02:09

## 2024-09-30 RX ADMIN — GABAPENTIN 300 MG: 300 CAPSULE ORAL at 09:09

## 2024-09-30 RX ADMIN — CARVEDILOL 25 MG: 25 TABLET, FILM COATED ORAL at 09:09

## 2024-09-30 RX ADMIN — FLUOXETINE HYDROCHLORIDE 40 MG: 20 CAPSULE ORAL at 09:09

## 2024-09-30 RX ADMIN — GABAPENTIN 300 MG: 300 CAPSULE ORAL at 02:09

## 2024-09-30 RX ADMIN — HYDROMORPHONE HYDROCHLORIDE 3 MG: 1 INJECTION, SOLUTION INTRAMUSCULAR; INTRAVENOUS; SUBCUTANEOUS at 10:09

## 2024-09-30 RX ADMIN — OXYCODONE HYDROCHLORIDE 15 MG: 10 TABLET ORAL at 11:09

## 2024-09-30 RX ADMIN — MORPHINE SULFATE 30 MG: 30 TABLET, FILM COATED, EXTENDED RELEASE ORAL at 08:09

## 2024-09-30 RX ADMIN — METHOCARBAMOL 750 MG: 750 TABLET ORAL at 08:09

## 2024-09-30 RX ADMIN — HYDROMORPHONE HYDROCHLORIDE 3 MG: 1 INJECTION, SOLUTION INTRAMUSCULAR; INTRAVENOUS; SUBCUTANEOUS at 09:09

## 2024-09-30 RX ADMIN — GABAPENTIN 300 MG: 300 CAPSULE ORAL at 08:09

## 2024-09-30 RX ADMIN — METHOCARBAMOL 750 MG: 750 TABLET ORAL at 12:09

## 2024-09-30 RX ADMIN — HYDROMORPHONE HYDROCHLORIDE 3 MG: 1 INJECTION, SOLUTION INTRAMUSCULAR; INTRAVENOUS; SUBCUTANEOUS at 12:09

## 2024-09-30 RX ADMIN — ACETAMINOPHEN 1000 MG: 500 TABLET ORAL at 08:09

## 2024-09-30 RX ADMIN — FLUTICASONE PROPIONATE 100 MCG: 50 SPRAY, METERED NASAL at 10:09

## 2024-09-30 NOTE — PROGRESS NOTES
[facility]  Progress Note    Patient Name: Nazanin Malone  MRN: [id number]  Patient Class: IP- Inpatient   Admission Date: 9/23/2024  Length of Stay: 7 days  Attending Physician: Yeison Steward MD  Primary Care Provider: Pee Montgomery MD        Subjective:     Principal Problem:Sickle-cell disease with pain        HPI:  Nazanin Malone is a 44 y.o. female with a hx of HTN, depression, asthma, sickle cell-beta thalassemia, PE, and carotid thrombosis presenting to the ED for chest pain and SOB. Patient says that yesterday morning she woke up with c/o of chest pain and SOB with pain being at 5/10. She took her daily medications and tried to rest but, there was no relief from the pain. Patient then decided to come to the ER for help as she has had acute chest syndrome before and thought that she may be experiencing it again. Patient was in the hospital last week for sickle cell pain crisis in the setting of small L medial malleolus avulsion and has been in and out of the hospital since June due to her sickle cell.    In the ED, patient was given a bolus of LR and received a total of 5mg of dilaudid for pain. Troponin negative at 0.006. D-dimer of 1.48 which appears to be baseline for patient as she takes lovenox at home. Reticulocytes are normal and not consistent with aplastic anemia. CXR showed no acute findings suggestive of acute chest syndrome.    Overview/Hospital Course:  Patient admitted for sickle cell pain crisis management, initially on multimodal pain mgmt but patient stated that her pain was still uncontrolled and rate it as 10/10 constantly. She requested to increase the dilaudid dose and to get IV benadryl. After multiple attempts of controlling her pain with multimodal regimen she was transitioned to PCA pump initially improving symptoms, however she accidentally pulled her PCA pump line. She requested to go back to multimodal pain management again starting  Dilaudid 3mg q3hs, plans to wean off. She later started complaining of itchiness and requested hydroxyzine and promethazine.        Interval History: DASH BARRIOS. Pt stating that her pain is improving, but wants another day with the same regimen and requested hydroxyzine and promethazine for itchiness.     Review of Systems  Objective:     Vital Signs (Most Recent):  Temp: 99.3 °F (37.4 °C) (09/29/24 1202)  Pulse: 83 (09/29/24 1202)  Resp: 18 (09/29/24 1202)  BP: 101/63 (09/29/24 1202)  SpO2: 99 % (09/29/24 1202) Vital Signs (24h Range):  Temp:  [98.6 °F (37 °C)-99.3 °F (37.4 °C)] 99.3 °F (37.4 °C)  Pulse:  [78-85] 83  Resp:  [8-18] 18  SpO2:  [96 %-100 %] 99 %  BP: (101-118)/(63-73) 101/63     Weight: 104.1 kg (229 lb 8 oz)  Body mass index is 41.98 kg/m².  No intake or output data in the 24 hours ending 09/29/24 1300      Physical Exam  Constitutional:       Appearance: She is obese.   HENT:      Head: Normocephalic.      Right Ear: Tympanic membrane and ear canal normal.      Left Ear: Tympanic membrane and ear canal normal.      Nose: Nose normal.      Mouth/Throat:      Mouth: Mucous membranes are moist.      Pharynx: Oropharynx is clear.   Eyes:      Extraocular Movements: Extraocular movements intact.      Conjunctiva/sclera: Conjunctivae normal.      Pupils: Pupils are equal, round, and reactive to light.   Cardiovascular:      Rate and Rhythm: Normal rate and regular rhythm.      Pulses: Normal pulses.      Heart sounds: Normal heart sounds.   Pulmonary:      Effort: Pulmonary effort is normal.      Breath sounds: Normal breath sounds.   Abdominal:      General: Bowel sounds are normal. There is no distension.      Palpations: Abdomen is soft. There is no mass.   Musculoskeletal:         General: No swelling or tenderness. Normal range of motion.      Cervical back: Normal range of motion and neck supple.   Skin:     General: Skin is warm and dry.      Capillary Refill: Capillary refill takes less than 2  seconds.      Coloration: Skin is not jaundiced or pale.   Neurological:      General: No focal deficit present.      Mental Status: She is alert and oriented to person, place, and time.      Cranial Nerves: No cranial nerve deficit.      Sensory: No sensory deficit.      Motor: No weakness.   Psychiatric:      Comments: Mood: sad          Significant Labs: All pertinent labs within the past 24 hours have been reviewed.    Significant Imaging: I have reviewed all pertinent imaging results/findings within the past 24 hours.    Assessment/Plan:      * Sickle-cell disease with pain  Nazanin Malone is a 44 y.o. female with a hx of HTN, PE, and sickle cell disease presenting to the ED for chest pain. Patient says that the chest pain started yesterday morning and she had no relief. She has be in and out of the hospital for sickle cell since June of this year. At the moment with breakthrough pain.     Plan:   -Continue multimodal pain mgmt ALL PRN : dilaudid 3mg q3hs, Oxy 15 q6HS , Oxy 10 q6hs. Plans to wean off dilaudid today to 2mg q3hrs  - keep patient hydrated with IV fluids  - use incentive spirometer to reduce incidence of acute chest syndrome  - continue VTE regimen  - take folic acid daily   - daily CBC  - benadryl po prn for itching related to dilaudid  -Will refer to Heme Onc and Pain Medicine outpatient.       Constipation  Plan:  - patient should continue to take lactulose  - continue to take senna-docusate prn  - have patient ambulate as tolerated      Thrombosis of internal carotid, left  Plan:   - patient is in more hypercoagulable state  - hx of PE  - continue enoxaparin [id number]  - monitor for s/s of PE and DVT      Morbid obesity  Body mass index is 44.08 kg/m². Morbid obesity complicates all aspects of disease management from diagnostic modalities to treatment. Weight loss encouraged and health benefits explained to patient.         Anxiety  Plan:   - denies thoughts of hurting self or others  -  denies depression  - continue to take fluoxetine 40mg daily      Trigeminal neuralgia  Plan:   - continue gabapentin 300TID      Hypertension  Plan:   - patient hypertensive on admission  - continue to monitor  - continue amlodipine 10mg daily  - continue carvedilol 25mg BID      VTE Risk Mitigation (From admission, onward)           Ordered     enoxaparin injection 60 mg  Every 12 hours         09/28/24 2332     IP VTE HIGH RISK PATIENT  Once         09/23/24 0443     Place sequential compression device  Until discontinued         09/23/24 0443                    Discharge Planning   ALYSE: 10/2/2024     Code Status: Full Code   Is the patient medically ready for discharge?:     Reason for patient still in hospital (select all that apply): Patient trending condition  Discharge Plan A: Home                  Huber López MD  Department of Hospital Medicine   [facility]

## 2024-10-01 LAB
ALBUMIN SERPL BCP-MCNC: 3.2 G/DL (ref 3.5–5.2)
ALP SERPL-CCNC: 65 U/L (ref 55–135)
ALT SERPL W/O P-5'-P-CCNC: 11 U/L (ref 10–44)
ANION GAP SERPL CALC-SCNC: 5 MMOL/L (ref 8–16)
AST SERPL-CCNC: 16 U/L (ref 10–40)
BASOPHILS # BLD AUTO: 0.05 K/UL (ref 0–0.2)
BASOPHILS NFR BLD: 0.7 % (ref 0–1.9)
BILIRUB SERPL-MCNC: 0.3 MG/DL (ref 0.1–1)
BUN SERPL-MCNC: 12 MG/DL (ref 6–20)
CALCIUM SERPL-MCNC: 9.2 MG/DL (ref 8.7–10.5)
CHLORIDE SERPL-SCNC: 109 MMOL/L (ref 95–110)
CO2 SERPL-SCNC: 27 MMOL/L (ref 23–29)
CREAT SERPL-MCNC: 1 MG/DL (ref 0.5–1.4)
DIFFERENTIAL METHOD BLD: ABNORMAL
EOSINOPHIL # BLD AUTO: 0.3 K/UL (ref 0–0.5)
EOSINOPHIL NFR BLD: 4.6 % (ref 0–8)
ERYTHROCYTE [DISTWIDTH] IN BLOOD BY AUTOMATED COUNT: 19.9 % (ref 11.5–14.5)
EST. GFR  (NO RACE VARIABLE): >60 ML/MIN/1.73 M^2
FACT X PPP CHRO-ACNC: 0.79 IU/ML (ref 0.3–0.7)
GLUCOSE SERPL-MCNC: 79 MG/DL (ref 70–110)
HCT VFR BLD AUTO: 22.5 % (ref 37–48.5)
HGB BLD-MCNC: 7.6 G/DL (ref 12–16)
IMM GRANULOCYTES # BLD AUTO: 0.01 K/UL (ref 0–0.04)
IMM GRANULOCYTES NFR BLD AUTO: 0.1 % (ref 0–0.5)
LYMPHOCYTES # BLD AUTO: 3.5 K/UL (ref 1–4.8)
LYMPHOCYTES NFR BLD: 49 % (ref 18–48)
MAGNESIUM SERPL-MCNC: 2 MG/DL (ref 1.6–2.6)
MCH RBC QN AUTO: 24.4 PG (ref 27–31)
MCHC RBC AUTO-ENTMCNC: 33.8 G/DL (ref 32–36)
MCV RBC AUTO: 72 FL (ref 82–98)
MONOCYTES # BLD AUTO: 1 K/UL (ref 0.3–1)
MONOCYTES NFR BLD: 13.5 % (ref 4–15)
NEUTROPHILS # BLD AUTO: 2.3 K/UL (ref 1.8–7.7)
NEUTROPHILS NFR BLD: 32.1 % (ref 38–73)
NRBC BLD-RTO: 1 /100 WBC
PHOSPHATE SERPL-MCNC: 3.1 MG/DL (ref 2.7–4.5)
PLATELET # BLD AUTO: 314 K/UL (ref 150–450)
PMV BLD AUTO: 10.4 FL (ref 9.2–12.9)
POTASSIUM SERPL-SCNC: 4 MMOL/L (ref 3.5–5.1)
PROT SERPL-MCNC: 6.5 G/DL (ref 6–8.4)
RBC # BLD AUTO: 3.11 M/UL (ref 4–5.4)
SODIUM SERPL-SCNC: 141 MMOL/L (ref 136–145)
WBC # BLD AUTO: 7.16 K/UL (ref 3.9–12.7)

## 2024-10-01 PROCEDURE — 63600175 PHARM REV CODE 636 W HCPCS

## 2024-10-01 PROCEDURE — 21400001 HC TELEMETRY ROOM

## 2024-10-01 PROCEDURE — 25000003 PHARM REV CODE 250

## 2024-10-01 PROCEDURE — 80053 COMPREHEN METABOLIC PANEL: CPT

## 2024-10-01 PROCEDURE — 85520 HEPARIN ASSAY: CPT

## 2024-10-01 PROCEDURE — 85025 COMPLETE CBC W/AUTO DIFF WBC: CPT

## 2024-10-01 PROCEDURE — 84100 ASSAY OF PHOSPHORUS: CPT

## 2024-10-01 PROCEDURE — 25000003 PHARM REV CODE 250: Performed by: INTERNAL MEDICINE

## 2024-10-01 PROCEDURE — 83735 ASSAY OF MAGNESIUM: CPT

## 2024-10-01 RX ORDER — HYDROMORPHONE HYDROCHLORIDE 1 MG/ML
2 INJECTION, SOLUTION INTRAMUSCULAR; INTRAVENOUS; SUBCUTANEOUS
Status: DISCONTINUED | OUTPATIENT
Start: 2024-10-01 | End: 2024-10-02

## 2024-10-01 RX ORDER — OXYCODONE HYDROCHLORIDE 10 MG/1
10 TABLET ORAL EVERY 4 HOURS PRN
Status: DISCONTINUED | OUTPATIENT
Start: 2024-10-01 | End: 2024-10-02 | Stop reason: HOSPADM

## 2024-10-01 RX ORDER — HYDROMORPHONE HYDROCHLORIDE 1 MG/ML
1 INJECTION, SOLUTION INTRAMUSCULAR; INTRAVENOUS; SUBCUTANEOUS ONCE AS NEEDED
Status: COMPLETED | OUTPATIENT
Start: 2024-10-01 | End: 2024-10-01

## 2024-10-01 RX ADMIN — OXYCODONE HYDROCHLORIDE 15 MG: 10 TABLET ORAL at 04:10

## 2024-10-01 RX ADMIN — OXYCODONE HYDROCHLORIDE 10 MG: 10 TABLET ORAL at 01:10

## 2024-10-01 RX ADMIN — HYDROMORPHONE HYDROCHLORIDE 2 MG: 1 INJECTION, SOLUTION INTRAMUSCULAR; INTRAVENOUS; SUBCUTANEOUS at 10:10

## 2024-10-01 RX ADMIN — GABAPENTIN 300 MG: 300 CAPSULE ORAL at 08:10

## 2024-10-01 RX ADMIN — HYDROMORPHONE HYDROCHLORIDE 2 MG: 1 INJECTION, SOLUTION INTRAMUSCULAR; INTRAVENOUS; SUBCUTANEOUS at 05:10

## 2024-10-01 RX ADMIN — CARVEDILOL 25 MG: 25 TABLET, FILM COATED ORAL at 08:10

## 2024-10-01 RX ADMIN — LACTULOSE 20 G: 20 SOLUTION ORAL at 08:10

## 2024-10-01 RX ADMIN — METHOCARBAMOL 750 MG: 750 TABLET ORAL at 08:10

## 2024-10-01 RX ADMIN — FLUOXETINE HYDROCHLORIDE 40 MG: 20 CAPSULE ORAL at 08:10

## 2024-10-01 RX ADMIN — SENNOSIDES AND DOCUSATE SODIUM 1 TABLET: 50; 8.6 TABLET ORAL at 08:10

## 2024-10-01 RX ADMIN — HYDROXYZINE HYDROCHLORIDE 25 MG: 25 TABLET ORAL at 03:10

## 2024-10-01 RX ADMIN — ENOXAPARIN SODIUM 60 MG: 60 INJECTION SUBCUTANEOUS at 12:10

## 2024-10-01 RX ADMIN — ACETAMINOPHEN 1000 MG: 500 TABLET ORAL at 05:10

## 2024-10-01 RX ADMIN — OXYCODONE HYDROCHLORIDE 15 MG: 10 TABLET ORAL at 08:10

## 2024-10-01 RX ADMIN — OXYCODONE HYDROCHLORIDE 15 MG: 10 TABLET ORAL at 03:10

## 2024-10-01 RX ADMIN — GABAPENTIN 300 MG: 300 CAPSULE ORAL at 03:10

## 2024-10-01 RX ADMIN — METHOCARBAMOL 750 MG: 750 TABLET ORAL at 12:10

## 2024-10-01 RX ADMIN — HYDROMORPHONE HYDROCHLORIDE 2 MG: 1 INJECTION, SOLUTION INTRAMUSCULAR; INTRAVENOUS; SUBCUTANEOUS at 08:10

## 2024-10-01 RX ADMIN — METHOCARBAMOL 750 MG: 750 TABLET ORAL at 04:10

## 2024-10-01 RX ADMIN — ACETAMINOPHEN 1000 MG: 500 TABLET ORAL at 08:10

## 2024-10-01 RX ADMIN — HYDROMORPHONE HYDROCHLORIDE 3 MG: 1 INJECTION, SOLUTION INTRAMUSCULAR; INTRAVENOUS; SUBCUTANEOUS at 02:10

## 2024-10-01 RX ADMIN — HYDROMORPHONE HYDROCHLORIDE 1 MG: 1 INJECTION, SOLUTION INTRAMUSCULAR; INTRAVENOUS; SUBCUTANEOUS at 11:10

## 2024-10-01 RX ADMIN — AMLODIPINE BESYLATE 10 MG: 10 TABLET ORAL at 08:10

## 2024-10-01 RX ADMIN — MUPIROCIN: 20 OINTMENT TOPICAL at 08:10

## 2024-10-01 RX ADMIN — DIPHENHYDRAMINE HYDROCHLORIDE 25 MG: 25 CAPSULE ORAL at 10:10

## 2024-10-01 RX ADMIN — HYDROMORPHONE HYDROCHLORIDE 3 MG: 1 INJECTION, SOLUTION INTRAMUSCULAR; INTRAVENOUS; SUBCUTANEOUS at 05:10

## 2024-10-01 RX ADMIN — OXYCODONE HYDROCHLORIDE 15 MG: 10 TABLET ORAL at 12:10

## 2024-10-01 RX ADMIN — FOLIC ACID 1 MG: 1 TABLET ORAL at 08:10

## 2024-10-01 RX ADMIN — FLUTICASONE PROPIONATE 100 MCG: 50 SPRAY, METERED NASAL at 08:10

## 2024-10-01 RX ADMIN — MORPHINE SULFATE 30 MG: 30 TABLET, FILM COATED, EXTENDED RELEASE ORAL at 08:10

## 2024-10-01 RX ADMIN — ENOXAPARIN SODIUM 60 MG: 60 INJECTION SUBCUTANEOUS at 01:10

## 2024-10-01 RX ADMIN — HYDROMORPHONE HYDROCHLORIDE 2 MG: 1 INJECTION, SOLUTION INTRAMUSCULAR; INTRAVENOUS; SUBCUTANEOUS at 02:10

## 2024-10-01 NOTE — SUBJECTIVE & OBJECTIVE
Interval History: Patient complaining of worsening left leg pain. No vomiting episodes, has had bowel movements. Has been eating, drinking and urinating spontaneously. Currently with plans to wean off iv dilaudid.     Review of Systems  Objective:     Vital Signs (Most Recent):  Temp: 98.8 °F (37.1 °C) (10/01/24 0804)  Pulse: 77 (10/01/24 0804)  Resp: 18 (10/01/24 0854)  BP: 138/88 (10/01/24 0804)  SpO2: 100 % (10/01/24 0804) Vital Signs (24h Range):  Temp:  [97.7 °F (36.5 °C)-98.8 °F (37.1 °C)] 98.8 °F (37.1 °C)  Pulse:  [69-84] 77  Resp:  [14-19] 18  SpO2:  [95 %-100 %] 100 %  BP: (121-148)/(58-88) 138/88     Weight: 104.1 kg (229 lb 8 oz)  Body mass index is 41.98 kg/m².  No intake or output data in the 24 hours ending 10/01/24 1044      Physical Exam  Constitutional:       Appearance: She is obese.   HENT:      Head: Normocephalic.      Right Ear: Tympanic membrane and ear canal normal.      Left Ear: Tympanic membrane and ear canal normal.      Nose: Nose normal.      Mouth/Throat:      Mouth: Mucous membranes are moist.      Pharynx: Oropharynx is clear.   Eyes:      Extraocular Movements: Extraocular movements intact.      Conjunctiva/sclera: Conjunctivae normal.      Pupils: Pupils are equal, round, and reactive to light.   Cardiovascular:      Rate and Rhythm: Normal rate and regular rhythm.      Pulses: Normal pulses.      Heart sounds: Normal heart sounds.   Pulmonary:      Effort: Pulmonary effort is normal.      Breath sounds: Normal breath sounds.   Abdominal:      General: Bowel sounds are normal. There is no distension.      Palpations: Abdomen is soft. There is no mass.   Musculoskeletal:         General: No swelling or tenderness. Normal range of motion.      Cervical back: Normal range of motion and neck supple.   Skin:     General: Skin is warm and dry.      Capillary Refill: Capillary refill takes less than 2 seconds.      Coloration: Skin is not jaundiced or pale.   Neurological:      General:  No focal deficit present.      Mental Status: She is alert and oriented to person, place, and time.      Cranial Nerves: No cranial nerve deficit.      Sensory: No sensory deficit.      Motor: No weakness.   Psychiatric:      Comments: Mood: sad              Significant Labs: All pertinent labs within the past 24 hours have been reviewed.    Significant Imaging: I have reviewed all pertinent imaging results/findings within the past 24 hours.

## 2024-10-01 NOTE — PROGRESS NOTES
[facility]  Progress Note    Patient Name: Nazanin Malone  MRN: [id number]  Patient Class: IP- Inpatient   Admission Date: 9/23/2024  Length of Stay: 8 days  Attending Physician: Yeison Steward MD  Primary Care Provider: Pee Montgomery MD        Subjective:     Principal Problem:Sickle-cell disease with pain        HPI:  Nazanin Malone is a 44 y.o. female with a hx of HTN, depression, asthma, sickle cell-beta thalassemia, PE, and carotid thrombosis presenting to the ED for chest pain and SOB. Patient says that yesterday morning she woke up with c/o of chest pain and SOB with pain being at 5/10. She took her daily medications and tried to rest but, there was no relief from the pain. Patient then decided to come to the ER for help as she has had acute chest syndrome before and thought that she may be experiencing it again. Patient was in the hospital last week for sickle cell pain crisis in the setting of small L medial malleolus avulsion and has been in and out of the hospital since June due to her sickle cell.    In the ED, patient was given a bolus of LR and received a total of 5mg of dilaudid for pain. Troponin negative at 0.006. D-dimer of 1.48 which appears to be baseline for patient as she takes lovenox at home. Reticulocytes are normal and not consistent with aplastic anemia. CXR showed no acute findings suggestive of acute chest syndrome.    Overview/Hospital Course:  Patient admitted for sickle cell pain crisis management, initially on multimodal pain mgmt but patient stated that her pain was still uncontrolled and rate it as 10/10 constantly. She requested to increase the dilaudid dose and to get IV benadryl. After multiple attempts of controlling her pain with multimodal regimen she was transitioned to PCA pump initially improving symptoms, however she accidentally pulled her PCA pump line. She requested to go back to multimodal pain management again starting  Dilaudid 3mg q3hs, plans to wean off. She later started complaining of itchiness and requested hydroxyzine and promethazine. Plans to wean off dilaudid IV.        Interval History: Patient complaining of worsening left leg pain. No vomiting episodes, has had bowel movements. Has been eating, drinking and urinating spontaneously. Currently with plans to wean off iv dilaudid.     Review of Systems  Objective:     Vital Signs (Most Recent):  Temp: 98.8 °F (37.1 °C) (10/01/24 0804)  Pulse: 77 (10/01/24 0804)  Resp: 18 (10/01/24 0854)  BP: 138/88 (10/01/24 0804)  SpO2: 100 % (10/01/24 0804) Vital Signs (24h Range):  Temp:  [97.7 °F (36.5 °C)-98.8 °F (37.1 °C)] 98.8 °F (37.1 °C)  Pulse:  [69-84] 77  Resp:  [14-19] 18  SpO2:  [95 %-100 %] 100 %  BP: (121-148)/(58-88) 138/88     Weight: 104.1 kg (229 lb 8 oz)  Body mass index is 41.98 kg/m².  No intake or output data in the 24 hours ending 10/01/24 1044      Physical Exam  Constitutional:       Appearance: She is obese.   HENT:      Head: Normocephalic.      Right Ear: Tympanic membrane and ear canal normal.      Left Ear: Tympanic membrane and ear canal normal.      Nose: Nose normal.      Mouth/Throat:      Mouth: Mucous membranes are moist.      Pharynx: Oropharynx is clear.   Eyes:      Extraocular Movements: Extraocular movements intact.      Conjunctiva/sclera: Conjunctivae normal.      Pupils: Pupils are equal, round, and reactive to light.   Cardiovascular:      Rate and Rhythm: Normal rate and regular rhythm.      Pulses: Normal pulses.      Heart sounds: Normal heart sounds.   Pulmonary:      Effort: Pulmonary effort is normal.      Breath sounds: Normal breath sounds.   Abdominal:      General: Bowel sounds are normal. There is no distension.      Palpations: Abdomen is soft. There is no mass.   Musculoskeletal:         General: No swelling or tenderness. Normal range of motion.      Cervical back: Normal range of motion and neck supple.   Skin:     General: Skin  is warm and dry.      Capillary Refill: Capillary refill takes less than 2 seconds.      Coloration: Skin is not jaundiced or pale.   Neurological:      General: No focal deficit present.      Mental Status: She is alert and oriented to person, place, and time.      Cranial Nerves: No cranial nerve deficit.      Sensory: No sensory deficit.      Motor: No weakness.   Psychiatric:      Comments: Mood: sad              Significant Labs: All pertinent labs within the past 24 hours have been reviewed.    Significant Imaging: I have reviewed all pertinent imaging results/findings within the past 24 hours.    Assessment/Plan:      * Sickle-cell disease with pain  Nazanin Malone is a 44 y.o. female with a hx of HTN, PE, and sickle cell disease presenting to the ED for chest pain. Patient says that the chest pain started yesterday morning and she had no relief. She has be in and out of the hospital for sickle cell since June of this year. At the moment with breakthrough pain.     Plan:   -Continue multimodal pain mgmt ALL PRN : dilaudid 2mg q3hs, Oxy 15 q6HS , Oxy 10 q6hs. Plans to continue to wean off dilaudid   - keep patient hydrated with IV fluids  - use incentive spirometer to reduce incidence of acute chest syndrome  - continue VTE regimen  - take folic acid daily   - daily CBC  - benadryl po prn for itching related to dilaudid  -Will refer to Heme Onc and Pain Medicine outpatient.       Constipation  Plan:  - patient should continue to take lactulose  - continue to take senna-docusate prn  - have patient ambulate as tolerated      Thrombosis of internal carotid, left  Plan:   - patient is in more hypercoagulable state  - hx of PE  - continue enoxaparin [id number]  - monitor for s/s of PE and DVT      Morbid obesity  Body mass index is 44.08 kg/m². Morbid obesity complicates all aspects of disease management from diagnostic modalities to treatment. Weight loss encouraged and health benefits explained to  patient.         Anxiety  Plan:   - denies thoughts of hurting self or others  - denies depression  - continue to take fluoxetine 40mg daily      Trigeminal neuralgia  Plan:   - continue gabapentin 300TID      Hypertension  Plan:   - patient hypertensive on admission  - continue to monitor  - continue amlodipine 10mg daily  - continue carvedilol 25mg BID      VTE Risk Mitigation (From admission, onward)           Ordered     enoxaparin injection 60 mg  Every 12 hours         09/28/24 2332     IP VTE HIGH RISK PATIENT  Once         09/23/24 0443     Place sequential compression device  Until discontinued         09/23/24 0443                    Discharge Planning   ALYSE: 10/2/2024     Code Status: Full Code   Is the patient medically ready for discharge?:     Reason for patient still in hospital (select all that apply): Patient trending condition  Discharge Plan A: Home with family   Discharge Delays: None known at this time              Polo Fuller MD  Department of Hospital Medicine   [facility]

## 2024-10-01 NOTE — PLAN OF CARE
10/01/24 1014   Post-Acute Status   Post-Acute Authorization Other   Other Status No Post-Acute Service Needs   Hospital Resources/Appts/Education Provided Appointments scheduled and added to AVS   Discharge Delays None known at this time   Discharge Plan   Discharge Plan A Home with family   Discharge Plan B Home

## 2024-10-01 NOTE — PLAN OF CARE
10/01/24 1015   Discharge Reassessment   Assessment Type Discharge Planning Reassessment   Did the patient's condition or plan change since previous assessment? No   Discharge Plan discussed with: Patient   Communicated ALYSE with patient/caregiver Yes   Discharge Plan A Home with family   Discharge Plan B Home   DME Needed Upon Discharge  none   Transition of Care Barriers None   Why the patient remains in the hospital Requires continued medical care   Post-Acute Status   Post-Acute Authorization Other   Other Status No Post-Acute Service Needs   Hospital Resources/Appts/Education Provided Appointments scheduled and added to AVS   Discharge Delays None known at this time

## 2024-10-01 NOTE — ASSESSMENT & PLAN NOTE
Nazanin Malone is a 44 y.o. female with a hx of HTN, PE, and sickle cell disease presenting to the ED for chest pain. Patient says that the chest pain started yesterday morning and she had no relief. She has be in and out of the hospital for sickle cell since June of this year. At the moment with breakthrough pain.     Plan:   -Continue multimodal pain mgmt ALL PRN : dilaudid 2mg q3hs, Oxy 15 q6HS , Oxy 10 q6hs. Plans to continue to wean off dilaudid   - keep patient hydrated with IV fluids  - use incentive spirometer to reduce incidence of acute chest syndrome  - continue VTE regimen  - take folic acid daily   - daily CBC  - benadryl po prn for itching related to dilaudid  -Will refer to Heme Onc and Pain Medicine outpatient.

## 2024-10-02 VITALS
HEIGHT: 62 IN | TEMPERATURE: 99 F | RESPIRATION RATE: 18 BRPM | DIASTOLIC BLOOD PRESSURE: 84 MMHG | WEIGHT: 229.5 LBS | SYSTOLIC BLOOD PRESSURE: 127 MMHG | HEART RATE: 77 BPM | BODY MASS INDEX: 42.23 KG/M2 | OXYGEN SATURATION: 98 %

## 2024-10-02 PROCEDURE — 25000003 PHARM REV CODE 250

## 2024-10-02 PROCEDURE — 63600175 PHARM REV CODE 636 W HCPCS

## 2024-10-02 PROCEDURE — 25000003 PHARM REV CODE 250: Performed by: INTERNAL MEDICINE

## 2024-10-02 RX ORDER — OXYCODONE HYDROCHLORIDE 15 MG/1
15 TABLET ORAL EVERY 4 HOURS PRN
Qty: 42 TABLET | Refills: 0 | Status: CANCELLED | OUTPATIENT
Start: 2024-10-02 | End: 2024-10-09

## 2024-10-02 RX ORDER — GABAPENTIN 300 MG/1
300 CAPSULE ORAL 3 TIMES DAILY
Qty: 90 CAPSULE | Refills: 0 | Status: ON HOLD | OUTPATIENT
Start: 2024-10-02 | End: 2024-11-01

## 2024-10-02 RX ORDER — HYDROMORPHONE HYDROCHLORIDE 1 MG/ML
1 INJECTION, SOLUTION INTRAMUSCULAR; INTRAVENOUS; SUBCUTANEOUS
Status: DISCONTINUED | OUTPATIENT
Start: 2024-10-02 | End: 2024-10-02 | Stop reason: HOSPADM

## 2024-10-02 RX ORDER — MORPHINE SULFATE 30 MG/1
30 TABLET, FILM COATED, EXTENDED RELEASE ORAL EVERY 12 HOURS
Qty: 14 TABLET | Refills: 0 | Status: ON HOLD | OUTPATIENT
Start: 2024-10-02 | End: 2024-10-09

## 2024-10-02 RX ORDER — LACTULOSE 10 G/15ML
20 SOLUTION ORAL; RECTAL NIGHTLY PRN
Status: CANCELLED
Start: 2024-10-02 | End: 2024-11-01

## 2024-10-02 RX ORDER — GABAPENTIN 300 MG/1
300 CAPSULE ORAL 3 TIMES DAILY
Qty: 90 CAPSULE | Refills: 0 | Status: CANCELLED | OUTPATIENT
Start: 2024-10-02 | End: 2024-11-01

## 2024-10-02 RX ORDER — MORPHINE SULFATE 30 MG/1
30 TABLET, FILM COATED, EXTENDED RELEASE ORAL EVERY 12 HOURS
Qty: 14 TABLET | Refills: 0 | Status: CANCELLED | OUTPATIENT
Start: 2024-10-02 | End: 2024-10-09

## 2024-10-02 RX ORDER — OXYCODONE HYDROCHLORIDE 15 MG/1
15 TABLET ORAL EVERY 4 HOURS PRN
Qty: 42 TABLET | Refills: 0 | Status: ON HOLD | OUTPATIENT
Start: 2024-10-02 | End: 2024-10-09

## 2024-10-02 RX ORDER — ACETAMINOPHEN 500 MG
1000 TABLET ORAL EVERY 8 HOURS
Qty: 42 TABLET | Refills: 0 | Status: ON HOLD | OUTPATIENT
Start: 2024-10-02 | End: 2024-10-09

## 2024-10-02 RX ORDER — HYDROMORPHONE HYDROCHLORIDE 1 MG/ML
2 INJECTION, SOLUTION INTRAMUSCULAR; INTRAVENOUS; SUBCUTANEOUS ONCE
Status: COMPLETED | OUTPATIENT
Start: 2024-10-02 | End: 2024-10-02

## 2024-10-02 RX ORDER — TIZANIDINE 4 MG/1
4 TABLET ORAL EVERY 8 HOURS PRN
Qty: 90 TABLET | Refills: 0 | Status: CANCELLED | OUTPATIENT
Start: 2024-10-02 | End: 2024-11-01

## 2024-10-02 RX ORDER — ENOXAPARIN SODIUM 100 MG/ML
60 INJECTION SUBCUTANEOUS EVERY 12 HOURS
Qty: 36 ML | Refills: 0 | Status: ON HOLD | OUTPATIENT
Start: 2024-10-02 | End: 2024-11-01

## 2024-10-02 RX ORDER — HEPARIN 100 UNIT/ML
5 SYRINGE INTRAVENOUS ONCE AS NEEDED
Status: COMPLETED | OUTPATIENT
Start: 2024-10-02 | End: 2024-10-02

## 2024-10-02 RX ADMIN — METHOCARBAMOL 750 MG: 750 TABLET ORAL at 08:10

## 2024-10-02 RX ADMIN — ENOXAPARIN SODIUM 60 MG: 60 INJECTION SUBCUTANEOUS at 01:10

## 2024-10-02 RX ADMIN — METHOCARBAMOL 750 MG: 750 TABLET ORAL at 12:10

## 2024-10-02 RX ADMIN — ENOXAPARIN SODIUM 60 MG: 60 INJECTION SUBCUTANEOUS at 12:10

## 2024-10-02 RX ADMIN — HEPARIN 500 UNITS: 100 SYRINGE at 12:10

## 2024-10-02 RX ADMIN — HYDROMORPHONE HYDROCHLORIDE 1 MG: 1 INJECTION, SOLUTION INTRAMUSCULAR; INTRAVENOUS; SUBCUTANEOUS at 09:10

## 2024-10-02 RX ADMIN — OXYCODONE HYDROCHLORIDE 15 MG: 10 TABLET ORAL at 01:10

## 2024-10-02 RX ADMIN — FLUTICASONE PROPIONATE 100 MCG: 50 SPRAY, METERED NASAL at 08:10

## 2024-10-02 RX ADMIN — CARVEDILOL 25 MG: 25 TABLET, FILM COATED ORAL at 08:10

## 2024-10-02 RX ADMIN — OXYCODONE HYDROCHLORIDE 15 MG: 10 TABLET ORAL at 08:10

## 2024-10-02 RX ADMIN — DIPHENHYDRAMINE HYDROCHLORIDE 25 MG: 25 CAPSULE ORAL at 05:10

## 2024-10-02 RX ADMIN — FOLIC ACID 1 MG: 1 TABLET ORAL at 08:10

## 2024-10-02 RX ADMIN — FLUOXETINE HYDROCHLORIDE 40 MG: 20 CAPSULE ORAL at 08:10

## 2024-10-02 RX ADMIN — HYDROMORPHONE HYDROCHLORIDE 2 MG: 1 INJECTION, SOLUTION INTRAMUSCULAR; INTRAVENOUS; SUBCUTANEOUS at 05:10

## 2024-10-02 RX ADMIN — AMLODIPINE BESYLATE 10 MG: 10 TABLET ORAL at 08:10

## 2024-10-02 RX ADMIN — HYDROMORPHONE HYDROCHLORIDE 2 MG: 1 INJECTION, SOLUTION INTRAMUSCULAR; INTRAVENOUS; SUBCUTANEOUS at 11:10

## 2024-10-02 RX ADMIN — GABAPENTIN 300 MG: 300 CAPSULE ORAL at 08:10

## 2024-10-02 RX ADMIN — ACETAMINOPHEN 1000 MG: 500 TABLET ORAL at 06:10

## 2024-10-02 RX ADMIN — MORPHINE SULFATE 30 MG: 30 TABLET, FILM COATED, EXTENDED RELEASE ORAL at 08:10

## 2024-10-02 RX ADMIN — SENNOSIDES AND DOCUSATE SODIUM 1 TABLET: 50; 8.6 TABLET ORAL at 08:10

## 2024-10-02 NOTE — PLAN OF CARE
Pt pcp scheduling team will contact pt  in regards to get them schedule for a upcoming appointment

## 2024-10-02 NOTE — ASSESSMENT & PLAN NOTE
Nazanin Malone is a 44 y.o. female with a hx of HTN, PE, and sickle cell disease presenting to the ED for chest pain. Patient says that the chest pain started yesterday morning and she had no relief. She has be in and out of the hospital for sickle cell since June of this year. At the moment with breakthrough pain.     Plan:   -Continue multimodal pain mgmt ALL PRN :  Oxy 15 q4HS , Oxy 10 q4hs. MsContin 30 BID. Plans to continue to wean off dilaudid   - keep patient hydrated with IV fluids  - use incentive spirometer to reduce incidence of acute chest syndrome  - continue VTE regimen  - take folic acid daily   - daily CBC  - benadryl po prn for itching related to dilaudid  -Will refer to Heme Onc and Pain Medicine outpatient.

## 2024-10-02 NOTE — PLAN OF CARE
[facility] - Med Surg  Discharge Final Note    Primary Care Provider: Pee Montgomery MD    Expected Discharge Date: 10/2/2024    Final Discharge Note (most recent)       Final Note - 10/02/24 0935          Final Note    Assessment Type Final Discharge Note     Anticipated Discharge Disposition Home or Self Care     Hospital Resources/Appts/Education Provided Appointments scheduled and added to AVS        Post-Acute Status    Post-Acute Authorization Other     Other Status No Post-Acute Service Needs     Discharge Delays None known at this time                     Important Message from Medicare  Important Message from Medicare regarding Discharge Appeal Rights: Given to patient/caregiver, Explained to patient/caregiver, Signed/date by patient/caregiver     Date IMM was signed: 10/01/24  Time IMM was signed: 0998

## 2024-10-02 NOTE — DISCHARGE SUMMARY
[facility] Medicine  Discharge Summary      Patient Name: Nazanin Malone  MRN: [id number]  VLADIMIR: [id number]  Patient Class: IP- Inpatient  Admission Date: 9/23/2024  Hospital Length of Stay: 9 days  Discharge Date and Time:  10/02/2024 11:07 AM  Attending Physician: Yeison Steward MD   Discharging Provider: Polo Fuller MD  Primary Care Provider: Pee Montgomery MD  [facility] Medicine Team: [place] [address] Polo Fuller MD  Primary Care Team: [place] [address]    HPI:   Nazanin Malone is a 44 y.o. female with a hx of HTN, depression, asthma, sickle cell-beta thalassemia, PE, and carotid thrombosis presenting to the ED for chest pain and SOB. Patient says that yesterday morning she woke up with c/o of chest pain and SOB with pain being at 5/10. She took her daily medications and tried to rest but, there was no relief from the pain. Patient then decided to come to the ER for help as she has had acute chest syndrome before and thought that she may be experiencing it again. Patient was in the hospital last week for sickle cell pain crisis in the setting of small L medial malleolus avulsion and has been in and out of the hospital since June due to her sickle cell.    In the ED, patient was given a bolus of LR and received a total of 5mg of dilaudid for pain. Troponin negative at 0.006. D-dimer of 1.48 which appears to be baseline for patient as she takes lovenox at home. Reticulocytes are normal and not consistent with aplastic anemia. CXR showed no acute findings suggestive of acute chest syndrome.    * No surgery found *      Hospital Course:   Patient admitted for sickle cell pain crisis management, initially on multimodal pain mgmt but patient stated that her pain was still uncontrolled and rate it as 10/10 constantly. She requested to increase the dilaudid dose and to get IV benadryl. After multiple attempts of controlling her pain with multimodal regimen she was  transitioned to PCA pump initially improving symptoms, however she accidentally pulled her PCA pump line. She requested to go back to multimodal pain management again starting Dilaudid 3mg q3hs, plans to wean off. She later started complaining of itchiness and requested hydroxyzine and promethazine. Plans to wean off dilaudid IV and DC on multimodal po pain management with Mscontin 30 BID, Oxy 10 q4h prn and 15 q4h prn. Will follow with Hematology Oncology outpatient.      Physical Exam:  Constitutional:       Appearance: She is obese.   HENT:      Head: Normocephalic.      Right Ear: Tympanic membrane and ear canal normal.      Left Ear: Tympanic membrane and ear canal normal.      Nose: Nose normal.      Mouth/Throat:      Mouth: Mucous membranes are moist.      Pharynx: Oropharynx is clear.   Eyes:      Extraocular Movements: Extraocular movements intact.      Conjunctiva/sclera: Conjunctivae normal.      Pupils: Pupils are equal, round, and reactive to light.   Cardiovascular:      Rate and Rhythm: Normal rate and regular rhythm.      Pulses: Normal pulses.      Heart sounds: Normal heart sounds.   Pulmonary:      Effort: Pulmonary effort is normal.      Breath sounds: Normal breath sounds.   Abdominal:      General: Bowel sounds are normal. There is no distension.      Palpations: Abdomen is soft. There is no mass.   Musculoskeletal:         General: No swelling or tenderness. Normal range of motion.      Cervical back: Normal range of motion and neck supple.   Skin:     General: Skin is warm and dry.      Capillary Refill: Capillary refill takes less than 2 seconds.      Coloration: Skin is not jaundiced or pale.   Neurological:      General: No focal deficit present.      Mental Status: She is alert and oriented to person, place, and time.      Cranial Nerves: No cranial nerve deficit.      Sensory: No sensory deficit.      Motor: No weakness.   Psychiatric:      Comments: Mood: sad         Goals of Care  Treatment Preferences:  Code Status: Full Code      SDOH Screening:  The patient was screened for utility difficulties, food insecurity, transport difficulties, housing insecurity, and interpersonal safety and there were no concerns identified this admission.     Consults:     Oncology  * Sickle-cell disease with pain  Nazanin Malone is a 44 y.o. female with a hx of HTN, PE, and sickle cell disease presenting to the ED for chest pain. Patient says that the chest pain started yesterday morning and she had no relief. She has be in and out of the hospital for sickle cell since June of this year. At the moment with breakthrough pain.     Plan:   -Continue multimodal pain mgmt ALL PRN :  Oxy 15 q4HS , Oxy 10 q4hs. MsContin 30 BID. Plans to continue to wean off dilaudid   - keep patient hydrated with IV fluids  - use incentive spirometer to reduce incidence of acute chest syndrome  - continue VTE regimen  - take folic acid daily   - daily CBC  - benadryl po prn for itching related to dilaudid  -Will refer to Heme Onc and Pain Medicine outpatient.         Final Active Diagnoses:    Diagnosis Date Noted POA    PRINCIPAL PROBLEM:  Sickle-cell disease with pain [D57.00] 04/16/2017 Yes    Constipation [K59.00] 08/22/2024 Yes    Thrombosis of internal carotid, left [I65.22] 08/03/2024 Yes    Morbid obesity [E66.01] 06/08/2020 Yes    Trigeminal neuralgia [G50.0] 03/20/2018 Yes    Anxiety [F41.9] 03/20/2018 Yes    Hypertension [I10] 04/16/2017 Yes      Problems Resolved During this Admission:    Diagnosis Date Noted Date Resolved POA    Hypokalemia [E87.6] 06/08/2020 09/28/2024 Yes       Discharged Condition: good    Disposition: Home or Self Care    Follow Up:   Follow-up Information       [facility] Follow up on 10/8/2024.    Why: Established pt's hospital f/u visit @ 9:30am. Please bring discharge summary, ID, insurance card, and medication list.  Contact information:  [place] [address]  [phone]                         Patient Instructions:      Ambulatory referral/consult to Hematology / Oncology   Standing Status: Future   Referral Priority: Routine Referral Type: Consultation   Referral Reason: Specialty Services Required   Requested Specialty: Hematology and Oncology   Number of Visits Requested: 1     Ambulatory referral/consult to Pain Clinic   Standing Status: Future   Referral Priority: Routine Referral Type: Consultation   Referral Reason: Specialty Services Required   Requested Specialty: Pain Medicine   Number of Visits Requested: 1       Significant Diagnostic Studies: N/A    Pending Diagnostic Studies:       Procedure Component Value Units Date/Time    CBC with Automated Differential [[id number]]     Order Status: Sent Lab Status: No result     Specimen: Blood     Comprehensive Metabolic Panel (CMP) [[id number]]     Order Status: Sent Lab Status: No result     Specimen: Blood     Magnesium [[id number]]     Order Status: Sent Lab Status: No result     Specimen: Blood     Phosphorus [[id number]]     Order Status: Sent Lab Status: No result     Specimen: Blood            Medications:  Reconciled Home Medications:      Medication List        START taking these medications      oxyCODONE 15 MG Tab  Commonly known as: ROXICODONE  Take 1 tablet (15 mg total) by mouth every 4 (four) hours as needed for Pain.            CHANGE how you take these medications      * acetaminophen 500 MG tablet  Commonly known as: TYLENOL  Take 1,000 mg by mouth every 8 (eight) hours as needed for Pain.  What changed: Another medication with the same name was added. Make sure you understand how and when to take each.     * acetaminophen 500 MG tablet  Commonly known as: TYLENOL  Take 2 tablets (1,000 mg total) by mouth every 8 (eight) hours. for 7 days  What changed: You were already taking a medication with the same name, and this prescription was added. Make sure you understand how and  when to take each.     * enoxaparin 80 mg/0.8 mL Syrg  Commonly known as: LOVENOX  DISCHARGE 0.1mls, THEN Inject 0.7 mLs (70 mg total) into the skin 2 (two) times daily.  What changed: Another medication with the same name was added. Make sure you understand how and when to take each.     * enoxaparin 60 mg/0.6 mL Syrg  Commonly known as: LOVENOX  Inject 0.6 mLs (60 mg total) into the skin every 12 (twelve) hours.  What changed: You were already taking a medication with the same name, and this prescription was added. Make sure you understand how and when to take each.     gabapentin 300 MG capsule  Commonly known as: NEURONTIN  Take 1 capsule (300 mg total) by mouth 3 (three) times daily.  What changed:   medication strength  how much to take  when to take this           * This list has 4 medication(s) that are the same as other medications prescribed for you. Read the directions carefully, and ask your doctor or other care provider to review them with you.                CONTINUE taking these medications      albuterol 90 mcg/actuation inhaler  Commonly known as: VENTOLIN HFA  Inhale 2 puffs into the lungs every 6 (six) hours as needed for Wheezing or Shortness of Breath. Rescue     amLODIPine 10 MG tablet  Commonly known as: NORVASC  Take 1 tablet (10 mg total) by mouth once daily.     carvediloL 25 MG tablet  Commonly known as: COREG  Take 1 tablet (25 mg total) by mouth 2 (two) times daily.     FLUoxetine 40 MG capsule  Take 1 capsule (40 mg total) by mouth once daily.     fluticasone propionate 50 mcg/actuation nasal spray  Commonly known as: FLONASE  INSTILL 1 SPRAY INTO EACH NOSTRIL EVERY DAY     folic acid 1 MG tablet  Commonly known as: FOLVITE  Take 1 tablet (1 mg total) by mouth once daily.     morphine 30 MG 12 hr tablet  Commonly known as: MS CONTIN  Take 1 tablet (30 mg total) by mouth every 12 (twelve) hours. for 7 days     promethazine 25 MG tablet  Commonly known as: PHENERGAN  Take 1 tablet (25 mg  total) by mouth every 6 (six) hours as needed for Nausea.     senna-docusate 8.6-50 mg 8.6-50 mg per tablet  Commonly known as: PERICOLACE  Take 1 tablet by mouth daily as needed for Constipation.     tiZANidine 4 MG tablet  Commonly known as: ZANAFLEX  Take 1 tablet (4 mg total) by mouth every 8 (eight) hours as needed.     VITAMIN C ORAL  Take 1 capsule by mouth once daily.            STOP taking these medications      CONSTULOSE 10 gram/15 mL solution  Generic drug: lactulose     oxyCODONE-acetaminophen  mg per tablet  Commonly known as: PERCOCET              Indwelling Lines/Drains at time of discharge:   Lines/Drains/Airways       Central Venous Catheter Line  Duration                  PowerPort A Cath Single Lumen Subclavian Right -- days                    Time spent on the discharge of patient: 45 minutes         Polo Fuller MD  Department of Hospital Medicine  [facility]

## 2024-10-02 NOTE — PLAN OF CARE
Pt to dc home today, PCP and Hemo appointments needed at dc, Sw sent message to scheduling team via [facility] [employer].

## 2024-10-02 NOTE — NURSING
Patient received discharge instructions. Port flushed with heparin and deaccessed. Patient will  medications from pharmacy. Patient in room waiting for wheelchair transport.

## 2024-10-03 ENCOUNTER — PATIENT OUTREACH (OUTPATIENT)
Dept: ADMINISTRATIVE | Facility: OTHER | Age: 44
End: 2024-10-03
Payer: MEDICARE

## 2024-10-08 ENCOUNTER — HOSPITAL ENCOUNTER (INPATIENT)
Facility: HOSPITAL | Age: 44
LOS: 5 days | Discharge: HOME | DRG: 812 | End: 2024-10-13
Attending: EMERGENCY MEDICINE | Admitting: HOSPITALIST
Payer: MEDICARE

## 2024-10-08 DIAGNOSIS — R52 PAIN: Primary | ICD-10-CM

## 2024-10-08 DIAGNOSIS — D57.00 SICKLE CELL ANEMIA WITH CRISIS: ICD-10-CM

## 2024-10-08 DIAGNOSIS — Z86.2 HISTORY OF SICKLE CELL ANEMIA: ICD-10-CM

## 2024-10-08 DIAGNOSIS — G25.3 MYOCLONIC JERKING: ICD-10-CM

## 2024-10-08 PROBLEM — M62.838 MUSCLE SPASM: Status: ACTIVE | Noted: 2024-10-08

## 2024-10-08 LAB
ALBUMIN SERPL BCP-MCNC: 3.7 G/DL (ref 3.5–5.2)
ALP SERPL-CCNC: 75 U/L (ref 55–135)
ALT SERPL W/O P-5'-P-CCNC: 27 U/L (ref 10–44)
ANION GAP SERPL CALC-SCNC: 7 MMOL/L (ref 8–16)
AST SERPL-CCNC: 44 U/L (ref 10–40)
BASOPHILS # BLD AUTO: 0.04 K/UL (ref 0–0.2)
BASOPHILS NFR BLD: 0.6 % (ref 0–1.9)
BILIRUB SERPL-MCNC: 0.7 MG/DL (ref 0.1–1)
BILIRUB UR QL STRIP: NEGATIVE
BILIRUB UR QL STRIP: NEGATIVE
BUN SERPL-MCNC: 9 MG/DL (ref 6–20)
CALCIUM SERPL-MCNC: 9.9 MG/DL (ref 8.7–10.5)
CHLORIDE SERPL-SCNC: 108 MMOL/L (ref 95–110)
CLARITY UR REFRACT.AUTO: CLEAR
CLARITY UR REFRACT.AUTO: CLEAR
CO2 SERPL-SCNC: 23 MMOL/L (ref 23–29)
COLOR UR AUTO: NORMAL
COLOR UR AUTO: NORMAL
CREAT SERPL-MCNC: 1 MG/DL (ref 0.5–1.4)
DIFFERENTIAL METHOD BLD: ABNORMAL
EOSINOPHIL # BLD AUTO: 0.2 K/UL (ref 0–0.5)
EOSINOPHIL NFR BLD: 3.2 % (ref 0–8)
ERYTHROCYTE [DISTWIDTH] IN BLOOD BY AUTOMATED COUNT: 18.8 % (ref 11.5–14.5)
EST. GFR  (NO RACE VARIABLE): >60 ML/MIN/1.73 M^2
GLUCOSE SERPL-MCNC: 99 MG/DL (ref 70–110)
GLUCOSE UR QL STRIP: NEGATIVE
GLUCOSE UR QL STRIP: NEGATIVE
HCT VFR BLD AUTO: 26.4 % (ref 37–48.5)
HGB BLD-MCNC: 9.3 G/DL (ref 12–16)
HGB UR QL STRIP: NEGATIVE
HGB UR QL STRIP: NEGATIVE
IMM GRANULOCYTES # BLD AUTO: 0.01 K/UL (ref 0–0.04)
IMM GRANULOCYTES NFR BLD AUTO: 0.1 % (ref 0–0.5)
KETONES UR QL STRIP: NEGATIVE
KETONES UR QL STRIP: NEGATIVE
LEUKOCYTE ESTERASE UR QL STRIP: NEGATIVE
LEUKOCYTE ESTERASE UR QL STRIP: NEGATIVE
LYMPHOCYTES # BLD AUTO: 2.4 K/UL (ref 1–4.8)
LYMPHOCYTES NFR BLD: 35.3 % (ref 18–48)
MAGNESIUM SERPL-MCNC: 1.9 MG/DL (ref 1.6–2.6)
MCH RBC QN AUTO: 24.4 PG (ref 27–31)
MCHC RBC AUTO-ENTMCNC: 35.2 G/DL (ref 32–36)
MCV RBC AUTO: 69 FL (ref 82–98)
MONOCYTES # BLD AUTO: 0.7 K/UL (ref 0.3–1)
MONOCYTES NFR BLD: 9.4 % (ref 4–15)
NEUTROPHILS # BLD AUTO: 3.5 K/UL (ref 1.8–7.7)
NEUTROPHILS NFR BLD: 51.4 % (ref 38–73)
NITRITE UR QL STRIP: NEGATIVE
NITRITE UR QL STRIP: NEGATIVE
NRBC BLD-RTO: 1 /100 WBC
PH UR STRIP: 7 [PH] (ref 5–8)
PH UR STRIP: 7 [PH] (ref 5–8)
PLATELET # BLD AUTO: 266 K/UL (ref 150–450)
PMV BLD AUTO: 9.7 FL (ref 9.2–12.9)
POTASSIUM SERPL-SCNC: 3.5 MMOL/L (ref 3.5–5.1)
PROT SERPL-MCNC: 7.8 G/DL (ref 6–8.4)
PROT UR QL STRIP: NEGATIVE
PROT UR QL STRIP: NEGATIVE
RBC # BLD AUTO: 3.81 M/UL (ref 4–5.4)
RETICS/RBC NFR AUTO: 0.6 % (ref 0.5–2.5)
SODIUM SERPL-SCNC: 138 MMOL/L (ref 136–145)
SP GR UR STRIP: 1.01 (ref 1–1.03)
SP GR UR STRIP: 1.01 (ref 1–1.03)
URN SPEC COLLECT METH UR: NORMAL
URN SPEC COLLECT METH UR: NORMAL
WBC # BLD AUTO: 6.89 K/UL (ref 3.9–12.7)

## 2024-10-08 PROCEDURE — 96375 TX/PRO/DX INJ NEW DRUG ADDON: CPT

## 2024-10-08 PROCEDURE — S5010 5% DEXTROSE AND 0.45% SALINE: HCPCS | Performed by: PHYSICIAN ASSISTANT

## 2024-10-08 PROCEDURE — 63600175 PHARM REV CODE 636 W HCPCS: Performed by: HOSPITALIST

## 2024-10-08 PROCEDURE — 20600001 HC STEP DOWN PRIVATE ROOM

## 2024-10-08 PROCEDURE — 63600175 PHARM REV CODE 636 W HCPCS: Performed by: PHYSICIAN ASSISTANT

## 2024-10-08 PROCEDURE — 25000003 PHARM REV CODE 250: Performed by: PHYSICIAN ASSISTANT

## 2024-10-08 PROCEDURE — 94761 N-INVAS EAR/PLS OXIMETRY MLT: CPT

## 2024-10-08 PROCEDURE — 96376 TX/PRO/DX INJ SAME DRUG ADON: CPT

## 2024-10-08 PROCEDURE — 85025 COMPLETE CBC W/AUTO DIFF WBC: CPT

## 2024-10-08 PROCEDURE — 63600175 PHARM REV CODE 636 W HCPCS

## 2024-10-08 PROCEDURE — 96361 HYDRATE IV INFUSION ADD-ON: CPT

## 2024-10-08 PROCEDURE — 80053 COMPREHEN METABOLIC PANEL: CPT

## 2024-10-08 PROCEDURE — 25000003 PHARM REV CODE 250

## 2024-10-08 PROCEDURE — 99285 EMERGENCY DEPT VISIT HI MDM: CPT | Mod: 25

## 2024-10-08 PROCEDURE — 96365 THER/PROPH/DIAG IV INF INIT: CPT

## 2024-10-08 PROCEDURE — 83735 ASSAY OF MAGNESIUM: CPT

## 2024-10-08 PROCEDURE — 85045 AUTOMATED RETICULOCYTE COUNT: CPT

## 2024-10-08 PROCEDURE — 81003 URINALYSIS AUTO W/O SCOPE: CPT | Performed by: PHYSICIAN ASSISTANT

## 2024-10-08 RX ORDER — MAGNESIUM SULFATE HEPTAHYDRATE 40 MG/ML
2 INJECTION, SOLUTION INTRAVENOUS
Status: COMPLETED | OUTPATIENT
Start: 2024-10-08 | End: 2024-10-08

## 2024-10-08 RX ORDER — AMLODIPINE BESYLATE 10 MG/1
10 TABLET ORAL DAILY
Status: DISCONTINUED | OUTPATIENT
Start: 2024-10-08 | End: 2024-10-13 | Stop reason: HOSPADM

## 2024-10-08 RX ORDER — AMOXICILLIN 250 MG
1 CAPSULE ORAL DAILY PRN
Status: DISCONTINUED | OUTPATIENT
Start: 2024-10-08 | End: 2024-10-13 | Stop reason: HOSPADM

## 2024-10-08 RX ORDER — FLUOXETINE HYDROCHLORIDE 20 MG/1
40 CAPSULE ORAL DAILY
Status: DISCONTINUED | OUTPATIENT
Start: 2024-10-08 | End: 2024-10-13 | Stop reason: HOSPADM

## 2024-10-08 RX ORDER — POLYETHYLENE GLYCOL 3350 17 G/17G
17 POWDER, FOR SOLUTION ORAL DAILY
Status: DISCONTINUED | OUTPATIENT
Start: 2024-10-08 | End: 2024-10-13 | Stop reason: HOSPADM

## 2024-10-08 RX ORDER — ACETAMINOPHEN 500 MG
1000 TABLET ORAL 3 TIMES DAILY
Status: DISCONTINUED | OUTPATIENT
Start: 2024-10-08 | End: 2024-10-13 | Stop reason: HOSPADM

## 2024-10-08 RX ORDER — HYDROMORPHONE HYDROCHLORIDE 1 MG/ML
2 INJECTION, SOLUTION INTRAMUSCULAR; INTRAVENOUS; SUBCUTANEOUS
Status: COMPLETED | OUTPATIENT
Start: 2024-10-08 | End: 2024-10-08

## 2024-10-08 RX ORDER — DEXTROSE MONOHYDRATE AND SODIUM CHLORIDE 5; .45 G/100ML; G/100ML
INJECTION, SOLUTION INTRAVENOUS CONTINUOUS
Status: DISCONTINUED | OUTPATIENT
Start: 2024-10-08 | End: 2024-10-10

## 2024-10-08 RX ORDER — HYDROMORPHONE HYDROCHLORIDE 1 MG/ML
2 INJECTION, SOLUTION INTRAMUSCULAR; INTRAVENOUS; SUBCUTANEOUS ONCE
Status: COMPLETED | OUTPATIENT
Start: 2024-10-08 | End: 2024-10-08

## 2024-10-08 RX ORDER — CARVEDILOL 25 MG/1
25 TABLET ORAL 2 TIMES DAILY
Status: DISCONTINUED | OUTPATIENT
Start: 2024-10-08 | End: 2024-10-13 | Stop reason: HOSPADM

## 2024-10-08 RX ORDER — FOLIC ACID 1 MG/1
1 TABLET ORAL DAILY
Status: DISCONTINUED | OUTPATIENT
Start: 2024-10-08 | End: 2024-10-13 | Stop reason: HOSPADM

## 2024-10-08 RX ORDER — HYDROMORPHONE HYDROCHLORIDE 1 MG/ML
2 INJECTION, SOLUTION INTRAMUSCULAR; INTRAVENOUS; SUBCUTANEOUS
Status: DISCONTINUED | OUTPATIENT
Start: 2024-10-08 | End: 2024-10-09

## 2024-10-08 RX ORDER — NALOXONE HCL 0.4 MG/ML
0.02 VIAL (ML) INJECTION
Status: DISCONTINUED | OUTPATIENT
Start: 2024-10-08 | End: 2024-10-13 | Stop reason: HOSPADM

## 2024-10-08 RX ORDER — SODIUM CHLORIDE 0.9 % (FLUSH) 0.9 %
10 SYRINGE (ML) INJECTION
Status: DISCONTINUED | OUTPATIENT
Start: 2024-10-08 | End: 2024-10-13 | Stop reason: HOSPADM

## 2024-10-08 RX ORDER — METHOCARBAMOL 750 MG/1
750 TABLET, FILM COATED ORAL 4 TIMES DAILY
Status: DISCONTINUED | OUTPATIENT
Start: 2024-10-08 | End: 2024-10-13 | Stop reason: HOSPADM

## 2024-10-08 RX ORDER — IPRATROPIUM BROMIDE AND ALBUTEROL SULFATE 2.5; .5 MG/3ML; MG/3ML
3 SOLUTION RESPIRATORY (INHALATION) EVERY 6 HOURS PRN
Status: DISCONTINUED | OUTPATIENT
Start: 2024-10-08 | End: 2024-10-13 | Stop reason: HOSPADM

## 2024-10-08 RX ORDER — DROPERIDOL 2.5 MG/ML
0.62 INJECTION, SOLUTION INTRAMUSCULAR; INTRAVENOUS EVERY 6 HOURS PRN
Status: DISCONTINUED | OUTPATIENT
Start: 2024-10-08 | End: 2024-10-13 | Stop reason: HOSPADM

## 2024-10-08 RX ORDER — DROPERIDOL 2.5 MG/ML
1.25 INJECTION, SOLUTION INTRAMUSCULAR; INTRAVENOUS
Status: COMPLETED | OUTPATIENT
Start: 2024-10-08 | End: 2024-10-08

## 2024-10-08 RX ORDER — DIPHENHYDRAMINE HCL 25 MG
25 CAPSULE ORAL
Status: COMPLETED | OUTPATIENT
Start: 2024-10-08 | End: 2024-10-08

## 2024-10-08 RX ORDER — GABAPENTIN 300 MG/1
300 CAPSULE ORAL 3 TIMES DAILY
Status: DISCONTINUED | OUTPATIENT
Start: 2024-10-08 | End: 2024-10-13 | Stop reason: HOSPADM

## 2024-10-08 RX ORDER — AMOXICILLIN 250 MG
1 CAPSULE ORAL 2 TIMES DAILY
Status: DISCONTINUED | OUTPATIENT
Start: 2024-10-08 | End: 2024-10-13 | Stop reason: HOSPADM

## 2024-10-08 RX ORDER — FOLIC ACID 1 MG/1
1 TABLET ORAL DAILY
Status: DISCONTINUED | OUTPATIENT
Start: 2024-10-08 | End: 2024-10-08

## 2024-10-08 RX ORDER — HYDROMORPHONE HYDROCHLORIDE 1 MG/ML
3 INJECTION, SOLUTION INTRAMUSCULAR; INTRAVENOUS; SUBCUTANEOUS
Status: COMPLETED | OUTPATIENT
Start: 2024-10-08 | End: 2024-10-08

## 2024-10-08 RX ORDER — TALC
6 POWDER (GRAM) TOPICAL NIGHTLY PRN
Status: DISCONTINUED | OUTPATIENT
Start: 2024-10-08 | End: 2024-10-13 | Stop reason: HOSPADM

## 2024-10-08 RX ORDER — MORPHINE SULFATE 30 MG/1
30 TABLET, FILM COATED, EXTENDED RELEASE ORAL EVERY 12 HOURS
Status: DISCONTINUED | OUTPATIENT
Start: 2024-10-08 | End: 2024-10-13 | Stop reason: HOSPADM

## 2024-10-08 RX ORDER — PROMETHAZINE HYDROCHLORIDE 25 MG/1
25 TABLET ORAL EVERY 6 HOURS PRN
Status: DISCONTINUED | OUTPATIENT
Start: 2024-10-08 | End: 2024-10-08

## 2024-10-08 RX ORDER — ENOXAPARIN SODIUM 100 MG/ML
60 INJECTION SUBCUTANEOUS EVERY 12 HOURS
Status: DISCONTINUED | OUTPATIENT
Start: 2024-10-08 | End: 2024-10-13 | Stop reason: HOSPADM

## 2024-10-08 RX ORDER — HYDROMORPHONE HYDROCHLORIDE 1 MG/ML
1 INJECTION, SOLUTION INTRAMUSCULAR; INTRAVENOUS; SUBCUTANEOUS EVERY 4 HOURS PRN
Status: DISCONTINUED | OUTPATIENT
Start: 2024-10-08 | End: 2024-10-08

## 2024-10-08 RX ADMIN — HYDROMORPHONE HYDROCHLORIDE 2 MG: 1 INJECTION, SOLUTION INTRAMUSCULAR; INTRAVENOUS; SUBCUTANEOUS at 05:10

## 2024-10-08 RX ADMIN — METHOCARBAMOL 750 MG: 750 TABLET ORAL at 09:10

## 2024-10-08 RX ADMIN — CARVEDILOL 25 MG: 25 TABLET, FILM COATED ORAL at 09:10

## 2024-10-08 RX ADMIN — DROPERIDOL 1.25 MG: 2.5 INJECTION, SOLUTION INTRAMUSCULAR; INTRAVENOUS at 10:10

## 2024-10-08 RX ADMIN — METHOCARBAMOL 750 MG: 750 TABLET ORAL at 02:10

## 2024-10-08 RX ADMIN — GABAPENTIN 300 MG: 300 CAPSULE ORAL at 09:10

## 2024-10-08 RX ADMIN — SODIUM CHLORIDE, POTASSIUM CHLORIDE, SODIUM LACTATE AND CALCIUM CHLORIDE 1000 ML: 600; 310; 30; 20 INJECTION, SOLUTION INTRAVENOUS at 10:10

## 2024-10-08 RX ADMIN — FLUOXETINE HYDROCHLORIDE 40 MG: 20 CAPSULE ORAL at 02:10

## 2024-10-08 RX ADMIN — MORPHINE SULFATE 30 MG: 30 TABLET, FILM COATED, EXTENDED RELEASE ORAL at 09:10

## 2024-10-08 RX ADMIN — HYDROMORPHONE HYDROCHLORIDE 2 MG: 1 INJECTION, SOLUTION INTRAMUSCULAR; INTRAVENOUS; SUBCUTANEOUS at 12:10

## 2024-10-08 RX ADMIN — ACETAMINOPHEN 1000 MG: 500 TABLET ORAL at 02:10

## 2024-10-08 RX ADMIN — HYDROMORPHONE HYDROCHLORIDE 2 MG: 1 INJECTION, SOLUTION INTRAMUSCULAR; INTRAVENOUS; SUBCUTANEOUS at 11:10

## 2024-10-08 RX ADMIN — HYDROMORPHONE HYDROCHLORIDE 1 MG: 1 INJECTION, SOLUTION INTRAMUSCULAR; INTRAVENOUS; SUBCUTANEOUS at 02:10

## 2024-10-08 RX ADMIN — DEXTROSE AND SODIUM CHLORIDE: 5; 450 INJECTION, SOLUTION INTRAVENOUS at 02:10

## 2024-10-08 RX ADMIN — ACETAMINOPHEN 1000 MG: 500 TABLET ORAL at 09:10

## 2024-10-08 RX ADMIN — DIPHENHYDRAMINE HYDROCHLORIDE 25 MG: 25 CAPSULE ORAL at 08:10

## 2024-10-08 RX ADMIN — HYDROMORPHONE HYDROCHLORIDE 2 MG: 1 INJECTION, SOLUTION INTRAMUSCULAR; INTRAVENOUS; SUBCUTANEOUS at 07:10

## 2024-10-08 RX ADMIN — METHOCARBAMOL 750 MG: 750 TABLET ORAL at 05:10

## 2024-10-08 RX ADMIN — AMLODIPINE BESYLATE 10 MG: 10 TABLET ORAL at 02:10

## 2024-10-08 RX ADMIN — POTASSIUM BICARBONATE 40 MEQ: 391 TABLET, EFFERVESCENT ORAL at 11:10

## 2024-10-08 RX ADMIN — POLYETHYLENE GLYCOL 3350 17 G: 17 POWDER, FOR SOLUTION ORAL at 02:10

## 2024-10-08 RX ADMIN — HYDROMORPHONE HYDROCHLORIDE 2 MG: 1 INJECTION, SOLUTION INTRAMUSCULAR; INTRAVENOUS; SUBCUTANEOUS at 10:10

## 2024-10-08 RX ADMIN — SENNOSIDES AND DOCUSATE SODIUM 1 TABLET: 50; 8.6 TABLET ORAL at 09:10

## 2024-10-08 RX ADMIN — HYDROMORPHONE HYDROCHLORIDE 3 MG: 1 INJECTION, SOLUTION INTRAMUSCULAR; INTRAVENOUS; SUBCUTANEOUS at 08:10

## 2024-10-08 RX ADMIN — FOLIC ACID 1 MG: 1 TABLET ORAL at 02:10

## 2024-10-08 RX ADMIN — MAGNESIUM SULFATE HEPTAHYDRATE 2 G: 40 INJECTION, SOLUTION INTRAVENOUS at 11:10

## 2024-10-08 RX ADMIN — GABAPENTIN 300 MG: 300 CAPSULE ORAL at 02:10

## 2024-10-08 RX ADMIN — OXYCODONE 15 MG: 5 TABLET ORAL at 02:10

## 2024-10-08 NOTE — HPI
43 y/o F with hx of morbid obesity, HTN, anxiety, depression, sickle-cell - beta thalassemia, rhabdomyolysis with foot drop, carotid thrombosis, prior PE and DVT on lovenox who presents for worsening right arm/ right hip pain and new onset muscle spasms. Patient with recent admission for sickle cell pain crisis from 9/23-10/2 and discharged with 7 day script of MS Contin 30 mg BID. She reports some mild arm/hip pain with acute worsening yesterday evening. Pain is located in her joints, typical of her pain cell crisis. However, she also started with jerking in her RUE/ RLE that began around 7pm, which then spread to her whole body jerking. She said she took her last done of MS Contin, doubled her muscle relaxer and her home gabapentin (300 mg TID) without relief so she presented to the ED. She states since receiving pain medicine and pain control the jerking has gotten significantly better. No apparent jerking on exam. She reports low back pain with sharp, shooting pain into her low back associated with the jerking. She is non-tender to hips or back. She does endorse a fall while coming down her steps yesterday 2/2 to her foot drop. She landed on her butt without head trauma, LOC, did not notice worsening pain after fall. Of note, patient is under significant stress 2/2 to early onset Alzheimer's in her mother who just returned from the hospital. No fever, chills, CP, SOB, neck pain, N/V, C/D, urinary symptoms, LE edema, numbness, tingling.         In ED, hypertensive otherwise VSS. Afebrile without leukocytosis.  Hgb 9.3 (higher than baseline), retic 0.6. Labs grossly unremarkable. Patient given Dilaudid X 3, 1L IVF, droperidol, diphenhydramine, and K/Mg replacement. Patient admitted to hospital medicine.

## 2024-10-08 NOTE — ASSESSMENT & PLAN NOTE
Patients blood pressure range in the last 24 hours was: BP  Min: 163/99  Max: 180/92.The patient's inpatient anti-hypertensive regimen is listed below:  Current Antihypertensives  amLODIPine tablet 10 mg, Daily, Oral  carvediloL tablet 25 mg, 2 times daily, Oral    Plan  - BP is controlled, no changes needed to their regimen  - cardiac diet

## 2024-10-08 NOTE — ED NOTES
Patient comes into the emergency department by private vehicle with complaints of muscle spasms,weakness.Patient states that she starting to fell weak around 0300. Patient denies SOB,chest ,chill,fever at this time.  LOC: The patient is awake, alert and aware of environment with an appropriate affect, the patient is oriented x 3 and speaking appropriately.   APPEARANCE: Patient appears comfortable and in no acute distress, patient is clean and well groomed.  SKIN: The skin is warm and dry, color consistent with ethnicity, patient has normal skin turgor and moist mucus membranes, skin intact, no breakdown or bruising noted.   MUSCULOSKELETAL: Patient moving all extremities spontaneously, no swelling noted.  RESPIRATORY: Airway is open and patent, respirations are spontaneous, patient has a normal effort and rate, no accessory muscle.  CARDIAC: Patient has a normal rate and regular rhythm, no edema noted, capillary refill < 3 seconds.   : Pt denies any pain or frequency with urination.  NEURO: Pt opens eyes spontaneously, behavior appropriate to situation, follows commands, facial expression symmetrical, bilateral hand grasp equal and even, purposeful motor response noted, normal sensation in all extremities when touched with a finger.

## 2024-10-08 NOTE — ASSESSMENT & PLAN NOTE
- unclear source  - do not suspect medication side effect (gabapentin, morphine, MS Contin, tizanadine)  - improved with pain control per patient  - start methocarbamol 750 mg TID  - fall precautions

## 2024-10-08 NOTE — ASSESSMENT & PLAN NOTE
Body mass index is 41.98 kg/m². Morbid obesity complicates all aspects of disease management from diagnostic modalities to treatment. Weight loss encouraged and health benefits explained to patient.

## 2024-10-08 NOTE — ASSESSMENT & PLAN NOTE
Patient presents with RUE/ R hip pain with acute worsening yesterday evening with new associated muscle spasms/jerking. Not resolved with home PO pain medications. Hgb baseline, retic 0.6. Jerking movements improved with pain control. No movement disorder seen on exam.    - VSS  - pain control with scheduled tylenol 1000 mg TID, MS Contin 30 mg BID, gabapentin 300 mg TID, home oxycodone 15 mg Q4h PRN, dilaudid 1 mg q4h breakthrough pain  - IVF  - monitor with daily labs  - needs close follow up with heme/onc

## 2024-10-08 NOTE — Clinical Note
Diagnosis: Pain [540927]   Future Attending Provider: VANESSA MUKHERJEE [[id number]]   Reason for IP Medical Treatment  (Clinical interventions that can only be accomplished in the IP setting? ) :: IV pain meds   Special Needs:: No Special Needs [1]

## 2024-10-08 NOTE — SUBJECTIVE & OBJECTIVE
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    Asthma     Depression     Hypertension     Morbid obesity     Opioid dependence 2017    Pneumonia due to Streptococcus pneumoniae 2017    Right lower lobe pneumonia 2017    Sepsis due to Streptococcus pneumoniae 2017    Sickle cell-beta thalassemia disease with pain     Trigeminal neuralgia        Past Surgical History:   Procedure Laterality Date     SECTION      TONSILLECTOMY      TUBAL LIGATION         Review of patient's allergies indicates:   Allergen Reactions    Cat dander Anaphylaxis, Itching and Shortness Of Breath    Nsaids (non-steroidal anti-inflammatory drug) Itching and Anaphylaxis    Latex Rash       No current facility-administered medications on file prior to encounter.     Current Outpatient Medications on File Prior to Encounter   Medication Sig    albuterol (VENTOLIN HFA) 90 mcg/actuation inhaler Inhale 2 puffs into the lungs every 6 (six) hours as needed for Wheezing or Shortness of Breath. Rescue    amLODIPine (NORVASC) 10 MG tablet Take 1 tablet (10 mg total) by mouth once daily.    ascorbic acid (VITAMIN C ORAL) Take 1 capsule by mouth once daily.    carvediloL (COREG) 25 MG tablet Take 1 tablet (25 mg total) by mouth 2 (two) times daily.    enoxaparin (LOVENOX) 60 mg/0.6 mL Syrg Inject 0.6 mLs (60 mg total) into the skin every 12 (twelve) hours.    FLUoxetine 40 MG capsule Take 1 capsule (40 mg total) by mouth once daily.    fluticasone propionate (FLONASE) 50 mcg/actuation nasal spray INSTILL 1 SPRAY INTO EACH NOSTRIL EVERY DAY    gabapentin (NEURONTIN) 300 MG capsule Take 1 capsule (300 mg total) by mouth 3 (three) times daily.    morphine (MS CONTIN) 30 MG 12 hr tablet Take 1 tablet (30 mg total) by mouth every 12 (twelve) hours. for 7 days    oxyCODONE (ROXICODONE) 15 MG Tab Take 1 tablet (15 mg total) by  mouth every 4 (four) hours as needed for Pain.    promethazine (PHENERGAN) 25 MG tablet Take 1 tablet (25 mg total) by mouth every 6 (six) hours as needed for Nausea.    tiZANidine (ZANAFLEX) 4 MG tablet Take 1 tablet (4 mg total) by mouth every 8 (eight) hours as needed.    acetaminophen (TYLENOL) 500 MG tablet Take 1,000 mg by mouth every 8 (eight) hours as needed for Pain.    acetaminophen (TYLENOL) 500 MG tablet Take 2 tablets (1,000 mg total) by mouth every 8 (eight) hours. for 7 days    folic acid (FOLVITE) 1 MG tablet Take 1 tablet (1 mg total) by mouth once daily.    senna-docusate 8.6-50 mg (PERICOLACE) 8.6-50 mg per tablet Take 1 tablet by mouth daily as needed for Constipation.     Family History       Problem Relation (Age of Onset)    Diabetes Mother    Heart disease Mother, Father          Tobacco Use    Smoking status: Never    Smokeless tobacco: Never   Substance and Sexual Activity    Alcohol use: No    Drug use: Yes     Types: Marijuana     Comment: periodically     Sexual activity: Not Currently     Birth control/protection: See Surgical Hx     Review of Systems   Constitutional:  Positive for fatigue. Negative for chills and fever.   HENT:  Negative for congestion and rhinorrhea.    Respiratory:  Negative for cough, shortness of breath and wheezing.    Cardiovascular:  Negative for chest pain, palpitations and leg swelling.   Gastrointestinal:  Negative for abdominal distention, diarrhea, nausea and vomiting.   Genitourinary:  Negative for difficulty urinating, flank pain, frequency and hematuria.   Musculoskeletal:  Positive for arthralgias and back pain. Negative for myalgias.   Neurological:  Positive for tremors.   Psychiatric/Behavioral:  Negative for agitation and behavioral problems.      Objective:     Vital Signs (Most Recent):  Temp: 98.4 °F (36.9 °C) (10/08/24 1114)  Pulse: 77 (10/08/24 1114)  Resp: 18 (10/08/24 1246)  BP: (!) 163/99 (10/08/24 1114)  SpO2: 100 % (10/08/24 1114) Vital  Signs (24h Range):  Temp:  [98.4 °F (36.9 °C)-98.7 °F (37.1 °C)] 98.4 °F (36.9 °C)  Pulse:  [77-78] 77  Resp:  [18-20] 18  SpO2:  [100 %] 100 %  BP: (163-180)/(92-99) 163/99     Weight: 104.1 kg (229 lb 8 oz)  Body mass index is 41.98 kg/m².     Physical Exam  Vitals and nursing note reviewed.   Constitutional:       Appearance: Normal appearance.   HENT:      Head: Normocephalic and atraumatic.      Nose: Nose normal. No congestion.      Mouth/Throat:      Mouth: Mucous membranes are moist.      Pharynx: Oropharynx is clear.   Eyes:      Extraocular Movements: Extraocular movements intact.      Conjunctiva/sclera: Conjunctivae normal.      Pupils: Pupils are equal, round, and reactive to light.   Cardiovascular:      Rate and Rhythm: Normal rate and regular rhythm.      Pulses: Normal pulses.      Heart sounds: Normal heart sounds. No murmur heard.  Pulmonary:      Effort: Pulmonary effort is normal. No respiratory distress.      Breath sounds: Normal breath sounds. No wheezing or rales.   Abdominal:      General: Abdomen is flat. Bowel sounds are normal. There is no distension.      Palpations: Abdomen is soft.      Tenderness: There is no abdominal tenderness. There is no guarding or rebound.   Musculoskeletal:         General: No tenderness.      Right lower leg: No edema.      Left lower leg: No edema.      Comments: Right hip non-tender to palpation  Full ROM to right hip  Spine without tenderness   Neurological:      General: No focal deficit present.      Mental Status: She is alert and oriented to person, place, and time.   Psychiatric:         Mood and Affect: Mood normal.         Behavior: Behavior normal.              CRANIAL NERVES     CN III, IV, VI   Pupils are equal, round, and reactive to light.       Significant Labs: All pertinent labs within the past 24 hours have been reviewed.  CBC:   Recent Labs   Lab 10/08/24  0821   WBC 6.89   HGB 9.3*   HCT 26.4*        CMP:   Recent Labs   Lab  10/08/24  0821      K 3.5      CO2 23   GLU 99   BUN 9   CREATININE 1.0   CALCIUM 9.9   PROT 7.8   ALBUMIN 3.7   BILITOT 0.7   ALKPHOS 75   AST 44*   ALT 27   ANIONGAP 7*       Significant Imaging: I have reviewed all pertinent imaging results/findings within the past 24 hours.

## 2024-10-08 NOTE — H&P
"[facility] - Emergency Dept  Hospital Medicine  History & Physical    Patient Name: Nazanin Malone  MRN: [id number]  Patient Class: IP- Inpatient  Admission Date: 10/8/2024  Attending Physician: Vijaya Acosta MD   Primary Care Provider: Pee Montgomery MD         Patient information was obtained from patient and ER records.     Subjective:     Principal Problem:Sickle cell anemia with crisis    Chief Complaint:   Chief Complaint   Patient presents with    Spasms     BIB ems for "muscle spasms" states started at 0300. States had a fall yesterday that preceded the pain.         HPI: 43 y/o F with hx of morbid obesity, HTN, anxiety, depression, sickle-cell - beta thalassemia, rhabdomyolysis with foot drop, carotid thrombosis, prior PE and DVT on lovenox who presents for worsening right arm/ right hip pain and new onset muscle spasms. Patient with recent admission for sickle cell pain crisis from 9/23-10/2 and discharged with 7 day script of MS Contin 30 mg BID. She reports some mild arm/hip pain with acute worsening yesterday evening. Pain is located in her joints, typical of her pain cell crisis. However, she also started with jerking in her RUE/ RLE that began around 7pm, which then spread to her whole body jerking. She said she took her last done of MS Contin, doubled her muscle relaxer and her home gabapentin (300 mg TID) without relief so she presented to the ED. She states since receiving pain medicine and pain control the jerking has gotten significantly better. No apparent jerking on exam. She reports low back pain with sharp, shooting pain into her low back associated with the jerking. She is non-tender to hips or back. She does endorse a fall while coming down her steps yesterday 2/2 to her foot drop. She landed on her butt without head trauma, LOC, did not notice worsening pain after fall. Of note, patient is under significant stress 2/2 to early onset Alzheimer's in her mother who just returned " from the hospital. No fever, chills, CP, SOB, neck pain, N/V, C/D, urinary symptoms, LE edema, numbness, tingling.         In ED, hypertensive otherwise VSS. Afebrile without leukocytosis.  Hgb 9.3 (higher than baseline), retic 0.6. Labs grossly unremarkable. Patient given Dilaudid X 3, 1L IVF, droperidol, diphenhydramine, and K/Mg replacement. Patient admitted to hospital medicine.    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    Asthma     Depression     Hypertension     Morbid obesity     Opioid dependence 2017    Pneumonia due to Streptococcus pneumoniae 2017    Right lower lobe pneumonia 2017    Sepsis due to Streptococcus pneumoniae 2017    Sickle cell-beta thalassemia disease with pain     Trigeminal neuralgia        Past Surgical History:   Procedure Laterality Date     SECTION      TONSILLECTOMY      TUBAL LIGATION         Review of patient's allergies indicates:   Allergen Reactions    Cat dander Anaphylaxis, Itching and Shortness Of Breath    Nsaids (non-steroidal anti-inflammatory drug) Itching and Anaphylaxis    Latex Rash       No current facility-administered medications on file prior to encounter.     Current Outpatient Medications on File Prior to Encounter   Medication Sig    albuterol (VENTOLIN HFA) 90 mcg/actuation inhaler Inhale 2 puffs into the lungs every 6 (six) hours as needed for Wheezing or Shortness of Breath. Rescue    amLODIPine (NORVASC) 10 MG tablet Take 1 tablet (10 mg total) by mouth once daily.    ascorbic acid (VITAMIN C ORAL) Take 1 capsule by mouth once daily.    carvediloL (COREG) 25 MG tablet Take 1 tablet (25 mg total) by mouth 2 (two) times daily.    enoxaparin (LOVENOX) 60 mg/0.6 mL Syrg Inject 0.6 mLs (60 mg total) into the skin every 12 (twelve) hours.    FLUoxetine 40 MG capsule Take 1 capsule (40 mg total) by mouth once daily.     fluticasone propionate (FLONASE) 50 mcg/actuation nasal spray INSTILL 1 SPRAY INTO EACH NOSTRIL EVERY DAY    gabapentin (NEURONTIN) 300 MG capsule Take 1 capsule (300 mg total) by mouth 3 (three) times daily.    morphine (MS CONTIN) 30 MG 12 hr tablet Take 1 tablet (30 mg total) by mouth every 12 (twelve) hours. for 7 days    oxyCODONE (ROXICODONE) 15 MG Tab Take 1 tablet (15 mg total) by mouth every 4 (four) hours as needed for Pain.    promethazine (PHENERGAN) 25 MG tablet Take 1 tablet (25 mg total) by mouth every 6 (six) hours as needed for Nausea.    tiZANidine (ZANAFLEX) 4 MG tablet Take 1 tablet (4 mg total) by mouth every 8 (eight) hours as needed.    acetaminophen (TYLENOL) 500 MG tablet Take 1,000 mg by mouth every 8 (eight) hours as needed for Pain.    acetaminophen (TYLENOL) 500 MG tablet Take 2 tablets (1,000 mg total) by mouth every 8 (eight) hours. for 7 days    folic acid (FOLVITE) 1 MG tablet Take 1 tablet (1 mg total) by mouth once daily.    senna-docusate 8.6-50 mg (PERICOLACE) 8.6-50 mg per tablet Take 1 tablet by mouth daily as needed for Constipation.     Family History       Problem Relation (Age of Onset)    Diabetes Mother    Heart disease Mother, Father          Tobacco Use    Smoking status: Never    Smokeless tobacco: Never   Substance and Sexual Activity    Alcohol use: No    Drug use: Yes     Types: Marijuana     Comment: periodically     Sexual activity: Not Currently     Birth control/protection: See Surgical Hx     Review of Systems   Constitutional:  Positive for fatigue. Negative for chills and fever.   HENT:  Negative for congestion and rhinorrhea.    Respiratory:  Negative for cough, shortness of breath and wheezing.    Cardiovascular:  Negative for chest pain, palpitations and leg swelling.   Gastrointestinal:  Negative for abdominal distention, diarrhea, nausea and vomiting.   Genitourinary:  Negative for difficulty urinating, flank pain, frequency and hematuria.    Musculoskeletal:  Positive for arthralgias and back pain. Negative for myalgias.   Neurological:  Positive for tremors.   Psychiatric/Behavioral:  Negative for agitation and behavioral problems.      Objective:     Vital Signs (Most Recent):  Temp: 98.4 °F (36.9 °C) (10/08/24 1114)  Pulse: 77 (10/08/24 1114)  Resp: 18 (10/08/24 1246)  BP: (!) 163/99 (10/08/24 1114)  SpO2: 100 % (10/08/24 1114) Vital Signs (24h Range):  Temp:  [98.4 °F (36.9 °C)-98.7 °F (37.1 °C)] 98.4 °F (36.9 °C)  Pulse:  [77-78] 77  Resp:  [18-20] 18  SpO2:  [100 %] 100 %  BP: (163-180)/(92-99) 163/99     Weight: 104.1 kg (229 lb 8 oz)  Body mass index is 41.98 kg/m².     Physical Exam  Vitals and nursing note reviewed.   Constitutional:       Appearance: Normal appearance.   HENT:      Head: Normocephalic and atraumatic.      Nose: Nose normal. No congestion.      Mouth/Throat:      Mouth: Mucous membranes are moist.      Pharynx: Oropharynx is clear.   Eyes:      Extraocular Movements: Extraocular movements intact.      Conjunctiva/sclera: Conjunctivae normal.      Pupils: Pupils are equal, round, and reactive to light.   Cardiovascular:      Rate and Rhythm: Normal rate and regular rhythm.      Pulses: Normal pulses.      Heart sounds: Normal heart sounds. No murmur heard.  Pulmonary:      Effort: Pulmonary effort is normal. No respiratory distress.      Breath sounds: Normal breath sounds. No wheezing or rales.   Abdominal:      General: Abdomen is flat. Bowel sounds are normal. There is no distension.      Palpations: Abdomen is soft.      Tenderness: There is no abdominal tenderness. There is no guarding or rebound.   Musculoskeletal:         General: No tenderness.      Right lower leg: No edema.      Left lower leg: No edema.      Comments: Right hip non-tender to palpation  Full ROM to right hip  Spine without tenderness   Neurological:      General: No focal deficit present.      Mental Status: She is alert and oriented to person,  place, and time.   Psychiatric:         Mood and Affect: Mood normal.         Behavior: Behavior normal.              CRANIAL NERVES     CN III, IV, VI   Pupils are equal, round, and reactive to light.       Significant Labs: All pertinent labs within the past 24 hours have been reviewed.  CBC:   Recent Labs   Lab 10/08/24  0821   WBC 6.89   HGB 9.3*   HCT 26.4*        CMP:   Recent Labs   Lab 10/08/24  0821      K 3.5      CO2 23   GLU 99   BUN 9   CREATININE 1.0   CALCIUM 9.9   PROT 7.8   ALBUMIN 3.7   BILITOT 0.7   ALKPHOS 75   AST 44*   ALT 27   ANIONGAP 7*       Significant Imaging: I have reviewed all pertinent imaging results/findings within the past 24 hours.  Assessment/Plan:     * Sickle cell anemia with crisis  Patient presents with RUE/ R hip pain with acute worsening yesterday evening with new associated muscle spasms/jerking. Not resolved with home PO pain medications. Hgb baseline, retic 0.6. Jerking movements improved with pain control. No movement disorder seen on exam.    - VSS  - pain control with scheduled tylenol 1000 mg TID, MS Contin 30 mg BID, gabapentin 300 mg TID, home oxycodone 15 mg Q4h PRN, dilaudid 1 mg q4h breakthrough pain  - IVF  - monitor with daily labs  - needs close follow up with heme/onc    Muscle spasm  - unclear source  - do not suspect medication side effect (gabapentin, morphine, MS Contin, tizanadine)  - improved with pain control per patient  - start methocarbamol 750 mg TID  - fall precautions       QT prolongation  - Qtc 504  - M g 1.9, replaced IV  - avoid Qtc prolongating medications     History of pulmonary embolism  - continue lovenox injections BID       Class 3 severe obesity due to excess calories without serious comorbidity with body mass index (BMI) of 50.0 to 59.9 in adult  Body mass index is 41.98 kg/m². Morbid obesity complicates all aspects of disease management from diagnostic modalities to treatment. Weight loss encouraged and health  benefits explained to patient.         Hypertension  Patients blood pressure range in the last 24 hours was: BP  Min: 163/99  Max: 180/92.The patient's inpatient anti-hypertensive regimen is listed below:  Current Antihypertensives  amLODIPine tablet 10 mg, Daily, Oral  carvediloL tablet 25 mg, 2 times daily, Oral    Plan  - BP is controlled, no changes needed to their regimen  - cardiac diet      VTE Risk Mitigation (From admission, onward)           Ordered     enoxaparin injection 60 mg  Every 12 hours         10/08/24 1336     IP VTE HIGH RISK PATIENT  Once         10/08/24 1336     Place sequential compression device  Until discontinued         10/08/24 1336     Place sequential compression device  Until discontinued         10/08/24 1241                                    Lois Bose PA-C  Department of Hospital Medicine  [facility] - Emergency Dept

## 2024-10-08 NOTE — ED NOTES
"Nazanin Malone, a 44 y.o. female presents to the ED via ems from home w/ complaint of spasms from fall on back yesterday. -cp, +sob, +bloodthinners. Pmhx of of sickle cell and htn  Triage note:  Chief Complaint   Patient presents with    Spasms     BIB ems for "muscle spasms" states started at 0300. States had a fall yesterday that preceded the pain.      Review of patient's allergies indicates:   Allergen Reactions    Cat dander Anaphylaxis, Itching and Shortness Of Breath    Nsaids (non-steroidal anti-inflammatory drug) Itching and Anaphylaxis    Latex Rash     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    Asthma     Depression     Hypertension     Morbid obesity     Opioid dependence 4/16/2017    Pneumonia due to Streptococcus pneumoniae 4/25/2017    Right lower lobe pneumonia 4/29/2017    Sepsis due to Streptococcus pneumoniae 4/25/2017    Sickle cell-beta thalassemia disease with pain     Trigeminal neuralgia      "

## 2024-10-08 NOTE — ED PROVIDER NOTES
"Encounter Date: 10/8/2024       History     Chief Complaint   Patient presents with    Spasms     BIB ems for "muscle spasms" states started at 0300. States had a fall yesterday that preceded the pain.      44-year-old female with a past medical history of sickle cell disease, strep pneumo sepsis, pneumonia, asthma acute chest syndrome presents with a chief complaint of muscle spasms.  The patient says that she had a fall yesterday onto her hips, she was ambulatory afterwards, did not hit her head or lose consciousness.  In this morning around 3:00 a.m., began having muscle spasms.  She was not sure if the 2 events are connected, she says that she was never had these spasms before.  She was denying any chest pain, shortness of breath or dysuria, any focal weakness or numbness anywhere.    The history is provided by the patient. No  was used.     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    Asthma     Depression     Hypertension     Morbid obesity     Opioid dependence 2017    Pneumonia due to Streptococcus pneumoniae 2017    Right lower lobe pneumonia 2017    Sepsis due to Streptococcus pneumoniae 2017    Sickle cell-beta thalassemia disease with pain     Trigeminal neuralgia      Past Surgical History:   Procedure Laterality Date     SECTION      TONSILLECTOMY      TUBAL LIGATION       Family History   Problem Relation Name Age of Onset    Heart disease Mother      Diabetes Mother      Heart disease Father      Breast cancer Neg Hx      Ovarian cancer Neg Hx      Colon cancer Neg Hx       Social History     Tobacco Use    " Smoking status: Never    Smokeless tobacco: Never   Substance Use Topics    Alcohol use: No    Drug use: Yes     Types: Marijuana     Comment: periodically      Review of Systems    Physical Exam     Initial Vitals [10/08/24 0638]   BP Pulse Resp Temp SpO2   (!) 180/92 78 20 98.7 °F (37.1 °C) 100 %      MAP       --         Physical Exam    Nursing note and vitals reviewed.  Constitutional: She appears well-developed and well-nourished. She is not diaphoretic. No distress.   HENT:   Head: Normocephalic.   Eyes: Pupils are equal, round, and reactive to light.   Neck: Neck supple.   Cardiovascular:  Normal rate, regular rhythm, normal heart sounds and intact distal pulses.           Pulmonary/Chest: Breath sounds normal. She has no wheezes. She has no rhonchi. She has no rales.   Abdominal: Abdomen is soft. There is no abdominal tenderness. There is no rebound and no guarding.   Musculoskeletal:         General: No tenderness or edema. Normal range of motion.      Cervical back: Neck supple.     Neurological: She is alert and oriented to person, place, and time. She has normal strength. No cranial nerve deficit or sensory deficit.   Patient has normal resting tone, irregularly occurring, occasional, brief myoclonic jerks, no rhythmic pattern   Skin: Skin is warm and dry. Capillary refill takes less than 2 seconds. No rash noted. No erythema. No pallor.   Psychiatric: She has a normal mood and affect. Her behavior is normal. Judgment and thought content normal.       ED Course   Procedures  Labs Reviewed   CBC W/ AUTO DIFFERENTIAL - Abnormal       Result Value    WBC 6.89      RBC 3.81 (*)     Hemoglobin 9.3 (*)     Hematocrit 26.4 (*)     MCV 69 (*)     MCH 24.4 (*)     MCHC 35.2      RDW 18.8 (*)     Platelets 266      MPV 9.7      Immature Granulocytes 0.1      Gran # (ANC) 3.5      Immature Grans (Abs) 0.01      Lymph # 2.4      Mono # 0.7      Eos # 0.2      Baso # 0.04      nRBC 1 (*)     Gran % 51.4       Lymph % 35.3      Mono % 9.4      Eosinophil % 3.2      Basophil % 0.6      Differential Method Automated     COMPREHENSIVE METABOLIC PANEL - Abnormal    Sodium 138      Potassium 3.5      Chloride 108      CO2 23      Glucose 99      BUN 9      Creatinine 1.0      Calcium 9.9      Total Protein 7.8      Albumin 3.7      Total Bilirubin 0.7      Alkaline Phosphatase 75      AST 44 (*)     ALT 27      eGFR >60.0      Anion Gap 7 (*)    RETICULOCYTES    Retic 0.6     URINALYSIS, REFLEX TO URINE CULTURE    Specimen UA Urine, Clean Catch      Color, UA Straw      Appearance, UA Clear      pH, UA 7.0      Specific Gravity, UA 1.010      Protein, UA Negative      Glucose, UA Negative      Ketones, UA Negative      Bilirubin (UA) Negative      Occult Blood UA Negative      Nitrite, UA Negative      Leukocytes, UA Negative      Narrative:     Upon admission  Specimen Source->Urine   MAGNESIUM   MAGNESIUM    Magnesium 1.9      Narrative:     ADD ON MG PER  /ORDER#[id number] @ 10/08/2024  09:50    URINALYSIS, REFLEX TO URINE CULTURE    Specimen UA Urine, Clean Catch      Color, UA Straw      Appearance, UA Clear      pH, UA 7.0      Specific Gravity, UA 1.010      Protein, UA Negative      Glucose, UA Negative      Ketones, UA Negative      Bilirubin (UA) Negative      Occult Blood UA Negative      Nitrite, UA Negative      Leukocytes, UA Negative      Narrative:     Upon admission  Specimen Source->Urine          Imaging Results              X-Ray Chest AP Portable (Final result)  Result time 10/08/24 15:08:31      Final result by Mariposa Michel MD (10/08/24 15:08:31)                   Impression:      No detrimental change      Electronically signed by: Mariposa Michel MD  Date:    10/08/2024  Time:    15:08               Narrative:    EXAMINATION:  XR CHEST AP PORTABLE    CLINICAL HISTORY:  rule out acute chest;    TECHNIQUE:  Single frontal view of the chest was performed.    COMPARISON:  September 23,  2024    FINDINGS:  Heart size is not enlarged.  Right central venous catheter has tip at the lower superior vena cava.  No pleural fluid is present.  Osseous structures are intact.  The lungs demonstrate no focal consolidation.                                       Medications   folic acid tablet 1 mg (1 mg Oral Given 10/8/24 1425)   naloxone 0.4 mg/mL injection 0.02 mg (has no administration in time range)   senna-docusate 8.6-50 mg per tablet 1 tablet (has no administration in time range)   albuterol-ipratropium 2.5 mg-0.5 mg/3 mL nebulizer solution 3 mL (has no administration in time range)   polyethylene glycol packet 17 g (17 g Oral Given 10/8/24 1426)   dextrose 5 % and 0.45 % NaCl infusion ( Intravenous New Bag 10/8/24 1436)   sodium chloride 0.9% flush 10 mL (has no administration in time range)   melatonin tablet 6 mg (has no administration in time range)   amLODIPine tablet 10 mg (10 mg Oral Given 10/8/24 1425)   carvediloL tablet 25 mg (has no administration in time range)   enoxaparin injection 60 mg (has no administration in time range)   FLUoxetine capsule 40 mg (40 mg Oral Given 10/8/24 1425)   gabapentin capsule 300 mg (300 mg Oral Given 10/8/24 1425)   morphine 12 hr tablet 30 mg (has no administration in time range)   oxyCODONE immediate release tablet 15 mg (15 mg Oral Given 10/8/24 1424)   senna-docusate 8.6-50 mg per tablet 1 tablet (has no administration in time range)   methocarbamoL tablet 750 mg (750 mg Oral Given 10/8/24 1425)   acetaminophen tablet 1,000 mg (1,000 mg Oral Given 10/8/24 1436)   droPERidol injection 0.625 mg (has no administration in time range)   HYDROmorphone injection 2 mg (has no administration in time range)   HYDROmorphone injection 3 mg (3 mg Intravenous Given 10/8/24 0814)   diphenhydrAMINE capsule 25 mg (25 mg Oral Given 10/8/24 0815)   lactated ringers bolus 1,000 mL (0 mLs Intravenous Stopped 10/8/24 1234)   droPERidol injection 1.25 mg (1.25 mg Intravenous Given  10/8/24 1022)   HYDROmorphone injection 2 mg (2 mg Intravenous Given 10/8/24 1021)   magnesium sulfate 2g in water 50mL IVPB (premix) (0 g Intravenous Stopped 10/8/24 1330)   potassium bicarbonate disintegrating tablet 40 mEq (40 mEq Oral Given 10/8/24 1128)   HYDROmorphone injection 2 mg (2 mg Intravenous Given 10/8/24 1246)     Medical Decision Making  See ED course for remainder of care    Amount and/or Complexity of Data Reviewed  Labs: ordered.  ECG/medicine tests:  Decision-making details documented in ED Course.    Risk  OTC drugs.  Prescription drug management.  Decision regarding hospitalization.              Attending Attestation:   Physician Attestation Statement for Resident:  As the supervising MD   Physician Attestation Statement: I have personally seen and examined this patient.   I agree with the above history.  -:   As the supervising MD I agree with the above PE.     As the supervising MD I agree with the above treatment, course, plan, and disposition.                  ED Course as of 10/08/24 1528   Tue Oct 08, 2024   0829 44-year-old female in no acute distress.  Patient was overall nontoxic and well-appearing.  Of note, the weather today is called her than it was yesterday.  Presentation is nonspecific, most concerning for possible dystonic reaction from medications versus electrolyte derangement versus atypical pain crisis presentation [BP]   1020 EKG 12-lead  Sinus rhythm at 77 beats per minute, prolonged QT interval, all other intervals within normal limits, diffuse T-wave flattening, no STEMI on my independent review [BP]   1525 The patient required multiple doses of IV pain medication.  If the patient was having a dystonic reaction I would have expected response to Benadryl, but the patient continues to have myoclonic jerks.  Of note whenever I have approached the patient for reassessment she was sleeping motion loss with a jerking returns when she is aroused.  The patient was ultimately  admitted to Hospital Medicine for pain control and further workup. [BP]      ED Course User Index  [BP] Adonay Murphy MD                             Clinical Impression:  Final diagnoses:  [G25.3] Myoclonic jerking  [R52] Pain (Primary)  [Z86.2] History of sickle cell anemia          ED Disposition Condition    Admit Stable                Adonay Murphy MD  Resident  10/08/24 1528       Nicolle Burgess MD  10/09/24 0714

## 2024-10-09 LAB
ALBUMIN SERPL BCP-MCNC: 3.5 G/DL (ref 3.5–5.2)
ALP SERPL-CCNC: 70 U/L (ref 55–135)
ALT SERPL W/O P-5'-P-CCNC: 24 U/L (ref 10–44)
ANION GAP SERPL CALC-SCNC: 8 MMOL/L (ref 8–16)
AST SERPL-CCNC: 35 U/L (ref 10–40)
BASOPHILS # BLD AUTO: 0.03 K/UL (ref 0–0.2)
BASOPHILS NFR BLD: 0.5 % (ref 0–1.9)
BILIRUB SERPL-MCNC: 0.5 MG/DL (ref 0.1–1)
BUN SERPL-MCNC: 10 MG/DL (ref 6–20)
CALCIUM SERPL-MCNC: 9.3 MG/DL (ref 8.7–10.5)
CHLORIDE SERPL-SCNC: 108 MMOL/L (ref 95–110)
CO2 SERPL-SCNC: 22 MMOL/L (ref 23–29)
CREAT SERPL-MCNC: 1 MG/DL (ref 0.5–1.4)
DIFFERENTIAL METHOD BLD: ABNORMAL
EOSINOPHIL # BLD AUTO: 0.4 K/UL (ref 0–0.5)
EOSINOPHIL NFR BLD: 6.7 % (ref 0–8)
ERYTHROCYTE [DISTWIDTH] IN BLOOD BY AUTOMATED COUNT: 18.8 % (ref 11.5–14.5)
EST. GFR  (NO RACE VARIABLE): >60 ML/MIN/1.73 M^2
FACT X PPP CHRO-ACNC: 0.61 IU/ML (ref 0.3–0.7)
GLUCOSE SERPL-MCNC: 102 MG/DL (ref 70–110)
HCT VFR BLD AUTO: 26.5 % (ref 37–48.5)
HGB BLD-MCNC: 8.8 G/DL (ref 12–16)
IMM GRANULOCYTES # BLD AUTO: 0.01 K/UL (ref 0–0.04)
IMM GRANULOCYTES NFR BLD AUTO: 0.2 % (ref 0–0.5)
LYMPHOCYTES # BLD AUTO: 2.9 K/UL (ref 1–4.8)
LYMPHOCYTES NFR BLD: 44.2 % (ref 18–48)
MCH RBC QN AUTO: 23.3 PG (ref 27–31)
MCHC RBC AUTO-ENTMCNC: 33.2 G/DL (ref 32–36)
MCV RBC AUTO: 70 FL (ref 82–98)
MONOCYTES # BLD AUTO: 0.7 K/UL (ref 0.3–1)
MONOCYTES NFR BLD: 10.8 % (ref 4–15)
NEUTROPHILS # BLD AUTO: 2.5 K/UL (ref 1.8–7.7)
NEUTROPHILS NFR BLD: 37.6 % (ref 38–73)
NRBC BLD-RTO: 1 /100 WBC
PLATELET # BLD AUTO: 234 K/UL (ref 150–450)
PMV BLD AUTO: 10 FL (ref 9.2–12.9)
POTASSIUM SERPL-SCNC: 4 MMOL/L (ref 3.5–5.1)
PROT SERPL-MCNC: 7.3 G/DL (ref 6–8.4)
RBC # BLD AUTO: 3.78 M/UL (ref 4–5.4)
SODIUM SERPL-SCNC: 138 MMOL/L (ref 136–145)
WBC # BLD AUTO: 6.6 K/UL (ref 3.9–12.7)

## 2024-10-09 PROCEDURE — 20600001 HC STEP DOWN PRIVATE ROOM

## 2024-10-09 PROCEDURE — S5010 5% DEXTROSE AND 0.45% SALINE: HCPCS | Performed by: PHYSICIAN ASSISTANT

## 2024-10-09 PROCEDURE — 36415 COLL VENOUS BLD VENIPUNCTURE: CPT | Performed by: HOSPITALIST

## 2024-10-09 PROCEDURE — 36415 COLL VENOUS BLD VENIPUNCTURE: CPT | Performed by: PHYSICIAN ASSISTANT

## 2024-10-09 PROCEDURE — 25000003 PHARM REV CODE 250: Performed by: HOSPITALIST

## 2024-10-09 PROCEDURE — 85520 HEPARIN ASSAY: CPT | Performed by: HOSPITALIST

## 2024-10-09 PROCEDURE — 80053 COMPREHEN METABOLIC PANEL: CPT | Performed by: PHYSICIAN ASSISTANT

## 2024-10-09 PROCEDURE — 63600175 PHARM REV CODE 636 W HCPCS: Performed by: PHYSICIAN ASSISTANT

## 2024-10-09 PROCEDURE — 63600175 PHARM REV CODE 636 W HCPCS: Performed by: HOSPITALIST

## 2024-10-09 PROCEDURE — 85025 COMPLETE CBC W/AUTO DIFF WBC: CPT | Performed by: PHYSICIAN ASSISTANT

## 2024-10-09 PROCEDURE — 25000003 PHARM REV CODE 250: Performed by: PHYSICIAN ASSISTANT

## 2024-10-09 PROCEDURE — 99223 1ST HOSP IP/OBS HIGH 75: CPT | Mod: GC,,, | Performed by: INTERNAL MEDICINE

## 2024-10-09 RX ORDER — ALPRAZOLAM 0.5 MG/1
1 TABLET ORAL 2 TIMES DAILY PRN
Status: DISCONTINUED | OUTPATIENT
Start: 2024-10-09 | End: 2024-10-13 | Stop reason: HOSPADM

## 2024-10-09 RX ORDER — DIPHENHYDRAMINE HYDROCHLORIDE 50 MG/ML
25 INJECTION INTRAMUSCULAR; INTRAVENOUS
Status: DISCONTINUED | OUTPATIENT
Start: 2024-10-09 | End: 2024-10-12

## 2024-10-09 RX ORDER — HYDROMORPHONE HYDROCHLORIDE 2 MG/ML
3 INJECTION, SOLUTION INTRAMUSCULAR; INTRAVENOUS; SUBCUTANEOUS
Status: DISCONTINUED | OUTPATIENT
Start: 2024-10-09 | End: 2024-10-12

## 2024-10-09 RX ORDER — HYDROXYUREA 500 MG/1
1000 CAPSULE ORAL 2 TIMES DAILY
Status: DISCONTINUED | OUTPATIENT
Start: 2024-10-09 | End: 2024-10-13 | Stop reason: HOSPADM

## 2024-10-09 RX ADMIN — METHOCARBAMOL 750 MG: 750 TABLET ORAL at 10:10

## 2024-10-09 RX ADMIN — HYDROMORPHONE HYDROCHLORIDE 2 MG: 1 INJECTION, SOLUTION INTRAMUSCULAR; INTRAVENOUS; SUBCUTANEOUS at 06:10

## 2024-10-09 RX ADMIN — DEXTROSE AND SODIUM CHLORIDE: 5; 450 INJECTION, SOLUTION INTRAVENOUS at 01:10

## 2024-10-09 RX ADMIN — SENNOSIDES AND DOCUSATE SODIUM 1 TABLET: 50; 8.6 TABLET ORAL at 10:10

## 2024-10-09 RX ADMIN — OXYCODONE 15 MG: 5 TABLET ORAL at 08:10

## 2024-10-09 RX ADMIN — DIPHENHYDRAMINE HYDROCHLORIDE 25 MG: 50 INJECTION, SOLUTION INTRAMUSCULAR; INTRAVENOUS at 09:10

## 2024-10-09 RX ADMIN — METHOCARBAMOL 750 MG: 750 TABLET ORAL at 09:10

## 2024-10-09 RX ADMIN — HYDROMORPHONE HYDROCHLORIDE 3 MG: 2 INJECTION INTRAMUSCULAR; INTRAVENOUS; SUBCUTANEOUS at 06:10

## 2024-10-09 RX ADMIN — ALPRAZOLAM 1 MG: 0.5 TABLET ORAL at 09:10

## 2024-10-09 RX ADMIN — POLYETHYLENE GLYCOL 3350 17 G: 17 POWDER, FOR SOLUTION ORAL at 10:10

## 2024-10-09 RX ADMIN — MORPHINE SULFATE 30 MG: 30 TABLET, FILM COATED, EXTENDED RELEASE ORAL at 10:10

## 2024-10-09 RX ADMIN — ACETAMINOPHEN 1000 MG: 500 TABLET ORAL at 02:10

## 2024-10-09 RX ADMIN — HYDROXYUREA 1000 MG: 500 CAPSULE ORAL at 08:10

## 2024-10-09 RX ADMIN — FOLIC ACID 1 MG: 1 TABLET ORAL at 10:10

## 2024-10-09 RX ADMIN — ENOXAPARIN SODIUM 60 MG: 60 INJECTION SUBCUTANEOUS at 10:10

## 2024-10-09 RX ADMIN — GABAPENTIN 300 MG: 300 CAPSULE ORAL at 10:10

## 2024-10-09 RX ADMIN — MORPHINE SULFATE 30 MG: 30 TABLET, FILM COATED, EXTENDED RELEASE ORAL at 08:10

## 2024-10-09 RX ADMIN — OXYCODONE 15 MG: 5 TABLET ORAL at 12:10

## 2024-10-09 RX ADMIN — SENNOSIDES AND DOCUSATE SODIUM 1 TABLET: 50; 8.6 TABLET ORAL at 08:10

## 2024-10-09 RX ADMIN — HYDROMORPHONE HYDROCHLORIDE 3 MG: 2 INJECTION INTRAMUSCULAR; INTRAVENOUS; SUBCUTANEOUS at 01:10

## 2024-10-09 RX ADMIN — ACETAMINOPHEN 1000 MG: 500 TABLET ORAL at 10:10

## 2024-10-09 RX ADMIN — METHOCARBAMOL 750 MG: 750 TABLET ORAL at 01:10

## 2024-10-09 RX ADMIN — DIPHENHYDRAMINE HYDROCHLORIDE 25 MG: 50 INJECTION, SOLUTION INTRAMUSCULAR; INTRAVENOUS at 06:10

## 2024-10-09 RX ADMIN — HYDROMORPHONE HYDROCHLORIDE 2 MG: 1 INJECTION, SOLUTION INTRAMUSCULAR; INTRAVENOUS; SUBCUTANEOUS at 10:10

## 2024-10-09 RX ADMIN — HYDROMORPHONE HYDROCHLORIDE 2 MG: 1 INJECTION, SOLUTION INTRAMUSCULAR; INTRAVENOUS; SUBCUTANEOUS at 03:10

## 2024-10-09 RX ADMIN — METHOCARBAMOL 750 MG: 750 TABLET ORAL at 05:10

## 2024-10-09 RX ADMIN — ACETAMINOPHEN 1000 MG: 500 TABLET ORAL at 08:10

## 2024-10-09 RX ADMIN — DIPHENHYDRAMINE HYDROCHLORIDE 25 MG: 50 INJECTION, SOLUTION INTRAMUSCULAR; INTRAVENOUS at 12:10

## 2024-10-09 RX ADMIN — ALPRAZOLAM 1 MG: 0.5 TABLET ORAL at 11:10

## 2024-10-09 RX ADMIN — CARVEDILOL 25 MG: 25 TABLET, FILM COATED ORAL at 08:10

## 2024-10-09 RX ADMIN — GABAPENTIN 300 MG: 300 CAPSULE ORAL at 08:10

## 2024-10-09 RX ADMIN — CARVEDILOL 25 MG: 25 TABLET, FILM COATED ORAL at 10:10

## 2024-10-09 RX ADMIN — ENOXAPARIN SODIUM 60 MG: 60 INJECTION SUBCUTANEOUS at 09:10

## 2024-10-09 RX ADMIN — GABAPENTIN 300 MG: 300 CAPSULE ORAL at 02:10

## 2024-10-09 RX ADMIN — FLUOXETINE HYDROCHLORIDE 40 MG: 20 CAPSULE ORAL at 10:10

## 2024-10-09 RX ADMIN — HYDROMORPHONE HYDROCHLORIDE 3 MG: 2 INJECTION INTRAMUSCULAR; INTRAVENOUS; SUBCUTANEOUS at 09:10

## 2024-10-09 RX ADMIN — AMLODIPINE BESYLATE 10 MG: 10 TABLET ORAL at 10:10

## 2024-10-09 NOTE — ED NOTES
Patient identifiers for Nazanin Malone checked and correct.    LOC: The patient is awake, alert and aware of environment with an appropriate affect, the patient is oriented x 4 and speaking appropriately.    APPEARANCE: Patient resting comfortably and states  that she is in a lot of pain on her r hip rated 10/10, patient is clean and well groomed, patient's clothing is properly fastened.    SKIN: The skin is warm and dry, color consistent with ethnicity, patient has normal skin turgor and moist mucus membranes, skin intact, no breakdown or bruising noted.    MUSCULOSKELETAL: Patient moving all extremities well, no obvious swelling or deformities noted.    RESPIRATORY: Airway is open and patent, respirations are spontaneous and even, patient has a normal effort and rate.    CARDIAC: Patient has a normal rate and rhythm, no periphreal edema noted, capillary refill < 3 seconds. Normal +2 pedal pulses present.    ABDOMEN: Soft and non tender to palpation, no distention noted. Patient denies any nausea, vomiting, diarrhea, or constipation.     NEUROLOGIC: Eyes open spontaneously, PERRL, behavior appropriate to situation, follows commands, facial expression symmetrical, bilateral hand grasp equal and even, purposeful motor response noted, normal sensation in all extremities.     HEENT: No abnormalities noted. White sclera and pupils equal round and reactive to light. Denies headache, dizziness.     : Pt voids independently, denies dysuria, hematuria, frequency.

## 2024-10-09 NOTE — PROGRESS NOTES
[facility]  Progress Note    Patient Name: Nazanin Malone  MRN: [id number]  Patient Class: IP- Inpatient   Admission Date: 10/8/2024  Length of Stay: 1 days  Attending Physician: Vijaya Acosta MD  Primary Care Provider: Pee Montgomery MD        Subjective:     Principal Problem:Sickle cell anemia with crisis        HPI:  43 y/o F with hx of morbid obesity, HTN, anxiety, depression, sickle-cell - beta thalassemia, rhabdomyolysis with foot drop, carotid thrombosis, prior PE and DVT on lovenox who presents for worsening right arm/ right hip pain and new onset muscle spasms. Patient with recent admission for sickle cell pain crisis from 9/23-10/2 and discharged with 7 day script of MS Contin 30 mg BID. She reports some mild arm/hip pain with acute worsening yesterday evening. Pain is located in her joints, typical of her pain cell crisis. However, she also started with jerking in her RUE/ RLE that began around 7pm, which then spread to her whole body jerking. She said she took her last done of MS Contin, doubled her muscle relaxer and her home gabapentin (300 mg TID) without relief so she presented to the ED. She states since receiving pain medicine and pain control the jerking has gotten significantly better. No apparent jerking on exam. She reports low back pain with sharp, shooting pain into her low back associated with the jerking. She is non-tender to hips or back. She does endorse a fall while coming down her steps yesterday 2/2 to her foot drop. She landed on her butt without head trauma, LOC, did not notice worsening pain after fall. Of note, patient is under significant stress 2/2 to early onset Alzheimer's in her mother who just returned from the hospital. No fever, chills, CP, SOB, neck pain, N/V, C/D, urinary symptoms, LE edema, numbness, tingling.         In ED, hypertensive otherwise VSS. Afebrile without leukocytosis.  Hgb 9.3 (higher than baseline),  retic 0.6. Labs grossly unremarkable. Patient given Dilaudid X 3, 1L IVF, droperidol, diphenhydramine, and K/Mg replacement. Patient admitted to hospital medicine.    Overview/Hospital Course:  No notes on file    Interval History:   10/9: patient with pain consistent with her sickle cell crisis. Notes that she is overdue for exchange.     Review of Systems   Constitutional:  Positive for fatigue. Negative for chills and fever.   HENT:  Negative for congestion and rhinorrhea.    Respiratory:  Negative for cough, shortness of breath and wheezing.    Cardiovascular:  Negative for chest pain, palpitations and leg swelling.   Gastrointestinal:  Negative for abdominal distention, diarrhea, nausea and vomiting.   Genitourinary:  Negative for difficulty urinating, flank pain, frequency and hematuria.   Musculoskeletal:  Positive for arthralgias and back pain. Negative for myalgias.   Neurological:  Positive for tremors.   Psychiatric/Behavioral:  Negative for agitation and behavioral problems.      Objective:     Vital Signs (Most Recent):  Temp: 98.1 °F (36.7 °C) (10/09/24 1130)  Pulse: 74 (10/09/24 1130)  Resp: 18 (10/09/24 1323)  BP: 120/73 (10/09/24 1130)  SpO2: 98 % (10/09/24 1130) Vital Signs (24h Range):  Temp:  [98.1 °F (36.7 °C)-99.1 °F (37.3 °C)] 98.1 °F (36.7 °C)  Pulse:  [72-88] 74  Resp:  [17-20] 18  SpO2:  [98 %-100 %] 98 %  BP: (105-139)/(71-86) 120/73     Weight: 104.1 kg (229 lb 8 oz)  Body mass index is 41.98 kg/m².    Intake/Output Summary (Last 24 hours) at 10/9/2024 1328  Last data filed at 10/9/2024 0649  Gross per 24 hour   Intake 875 ml   Output --   Net 875 ml         Physical Exam  Vitals and nursing note reviewed.   Constitutional:       Appearance: Normal appearance.   HENT:      Head: Normocephalic and atraumatic.      Nose: Nose normal. No congestion.      Mouth/Throat:      Mouth: Mucous membranes are moist.      Pharynx: Oropharynx is clear.   Eyes:      Extraocular Movements: Extraocular  movements intact.      Conjunctiva/sclera: Conjunctivae normal.      Pupils: Pupils are equal, round, and reactive to light.   Cardiovascular:      Rate and Rhythm: Normal rate and regular rhythm.      Pulses: Normal pulses.      Heart sounds: Normal heart sounds. No murmur heard.  Pulmonary:      Effort: Pulmonary effort is normal. No respiratory distress.      Breath sounds: Normal breath sounds. No wheezing or rales.   Abdominal:      General: Abdomen is flat. Bowel sounds are normal. There is no distension.      Palpations: Abdomen is soft.      Tenderness: There is no abdominal tenderness. There is no guarding or rebound.   Musculoskeletal:         General: No tenderness.      Right lower leg: No edema.      Left lower leg: No edema.      Comments: Right hip non-tender to palpation  Full ROM to right hip  Spine without tenderness   Neurological:      General: No focal deficit present.      Mental Status: She is alert and oriented to person, place, and time.   Psychiatric:         Mood and Affect: Mood normal.         Behavior: Behavior normal.             Significant Labs: All pertinent labs within the past 24 hours have been reviewed.    Significant Imaging: I have reviewed all pertinent imaging results/findings within the past 24 hours.    Assessment/Plan:      * Sickle cell anemia with crisis  Patient presents with RUE/ R hip pain with acute worsening yesterday evening with new associated muscle spasms/jerking. Not resolved with home PO pain medications. Hgb baseline, retic 0.6. Jerking movements improved with pain control. No movement disorder seen on exam.    - VSS  - pain control with scheduled tylenol 1000 mg TID, MS Contin 30 mg BID, gabapentin 300 mg TID, home oxycodone 15 mg Q4h PRN, dilaudid 1 mg q4h breakthrough pain  - IVF  - monitor with daily labs  - needs close follow up with heme/onc - consulted as she is overdue for maintenance exchange.     Muscle spasm  - unclear source  - do not suspect  medication side effect (gabapentin, morphine, MS Contin, tizanadine)  - improved with pain control per patient  - start methocarbamol 750 mg TID  - fall precautions       QT prolongation  - Qtc 504  - M g 1.9, replaced IV  - avoid Qtc prolongating medications     History of pulmonary embolism  - continue lovenox injections BID       Severe obesity (BMI >= 40)  Body mass index is 41.98 kg/m². Morbid obesity complicates all aspects of disease management from diagnostic modalities to treatment. Weight loss encouraged and health benefits explained to patient.         Hypertension  Patients blood pressure range in the last 24 hours was: BP  Min: 163/99  Max: 180/92.The patient's inpatient anti-hypertensive regimen is listed below:  Current Antihypertensives  amLODIPine tablet 10 mg, Daily, Oral  carvediloL tablet 25 mg, 2 times daily, Oral    Plan  - BP is controlled, no changes needed to their regimen  - cardiac diet      VTE Risk Mitigation (From admission, onward)           Ordered     enoxaparin injection 60 mg  Every 12 hours         10/08/24 1336     IP VTE HIGH RISK PATIENT  Once         10/08/24 1336     Place sequential compression device  Until discontinued         10/08/24 1336     Place sequential compression device  Until discontinued         10/08/24 1241                    Discharge Planning   ALYSE: 10/12/2024     Code Status: Full Code   Is the patient medically ready for discharge?:     Reason for patient still in hospital (select all that apply): Patient trending condition                     Vijaya Acosta MD  Department of Hospital Medicine   [facility]

## 2024-10-09 NOTE — CARE UPDATE
I have reviewed the chart of Nazanin Malone and collaborated with Vijaya Acosta MD in the care of the patient who is hospitalized for the following:    Active Hospital Problems    Diagnosis    *Sickle cell anemia with crisis    Muscle spasm    QT prolongation    History of pulmonary embolism    Severe obesity (BMI >= 40)    Hypertension          I have reviewed Nazanin Malone with the multidisciplinary team during discharge huddle.       Lelia Nielsen PA-C  [facility]

## 2024-10-09 NOTE — PLAN OF CARE
Problem: Adult Inpatient Plan of Care  Goal: Plan of Care Review  Outcome: Progressing  Goal: Patient-Specific Goal (Individualized)  Outcome: Progressing  Goal: Absence of Hospital-Acquired Illness or Injury  Outcome: Progressing  Goal: Optimal Comfort and Wellbeing  Outcome: Progressing     Problem: Bariatric Environmental Safety  Goal: Safety Maintained with Care  Outcome: Progressing     Problem: Acute Kidney Injury/Impairment  Goal: Fluid and Electrolyte Balance  Outcome: Progressing  Goal: Improved Oral Intake  Outcome: Progressing  Goal: Effective Renal Function  Outcome: Progressing     Problem: Pneumonia  Goal: Fluid Balance  Outcome: Progressing  Goal: Resolution of Infection Signs and Symptoms  Outcome: Progressing  Goal: Effective Oxygenation and Ventilation  Outcome: Progressing

## 2024-10-09 NOTE — ASSESSMENT & PLAN NOTE
Patient presents with RUE/ R hip pain with acute worsening yesterday evening with new associated muscle spasms/jerking. Not resolved with home PO pain medications. Hgb baseline, retic 0.6. Jerking movements improved with pain control. No movement disorder seen on exam.    - VSS  - pain control with scheduled tylenol 1000 mg TID, MS Contin 30 mg BID, gabapentin 300 mg TID, home oxycodone 15 mg Q4h PRN, dilaudid 1 mg q4h breakthrough pain  - IVF  - monitor with daily labs  - needs close follow up with heme/onc - consulted as she is overdue for maintenance exchange.

## 2024-10-09 NOTE — CONSULTS
Hematology Oncology Consult Note    Inpatient consult to Hematology  Consult performed by: Indio Ch DO  Consult ordered by: Vijaya Acosta MD        SUBJECTIVE:     History of Present Illness:  45 y/o F with hx of morbid obesity, HTN, anxiety, depression, sickle-cell - beta thalassemia, anecdotal CVA with no deficits, rhabdomyolysis with foot drop, carotid thrombosis, prior PE and DVT on lovenox who presents for worsening right arm/ right hip pain and new onset muscle spasms. Patient with recent admission for sickle cell pain crisis from 9/23-10/2 and discharged with 7 day script of MS Contin 30 mg BID. She reports some mild arm/hip pain with acute worsening yesterday evening. Pain is located in her joints, typical of her pain cell crisis. However, she also started with jerking in her RUE/ RLE that began around 7pm, which then spread to her whole body jerking. She said she took her last done of MS Contin, doubled her muscle relaxer and her home gabapentin (300 mg TID) without relief so she presented to the ED. She states since receiving pain medicine and pain control the jerking has gotten significantly better. No apparent jerking on exam. She reports low back pain with sharp, shooting pain into her low back associated with the jerking. She is non-tender to hips or back. She does endorse a fall while coming down her steps yesterday 2/2 to her foot drop. She landed on her butt without head trauma, LOC, did not notice worsening pain after fall. Of note, patient is under significant stress 2/2 to early onset Alzheimer's in her mother who just returned from the hospital. No fever, chills, CP, SOB, neck pain, N/V, C/D, urinary symptoms, LE edema, numbness, tingling.      In ED, hypertensive otherwise VSS. Afebrile without leukocytosis.  Hgb 9.3 (higher than baseline), retic 0.6. Labs grossly unremarkable. Patient given Dilaudid X 3, 1L IVF, droperidol, diphenhydramine, and K/Mg replacement. Patient admitted to  [facility] medicine.      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    Asthma     Depression     Hypertension     Morbid obesity     Opioid dependence 2017    Pneumonia due to Streptococcus pneumoniae 2017    Right lower lobe pneumonia 2017    Sepsis due to Streptococcus pneumoniae 2017    Sickle cell-beta thalassemia disease with pain     Trigeminal neuralgia      Past Surgical History:   Procedure Laterality Date     SECTION      TONSILLECTOMY      TUBAL LIGATION       Family History   Problem Relation Name Age of Onset    Heart disease Mother      Diabetes Mother      Heart disease Father      Breast cancer Neg Hx      Ovarian cancer Neg Hx      Colon cancer Neg Hx       Social History     Tobacco Use    Smoking status: Never    Smokeless tobacco: Never   Substance Use Topics    Alcohol use: No    Drug use: Yes     Types: Marijuana     Comment: periodically      Review of Systems   Constitutional:  Positive for malaise/fatigue. Negative for chills and fever.   HENT:  Negative for nosebleeds and sore throat.    Eyes:  Negative for blurred vision and double vision.   Respiratory:  Negative for hemoptysis and shortness of breath.    Cardiovascular:  Negative for chest pain and palpitations.   Gastrointestinal:  Negative for blood in stool, melena, nausea and vomiting.   Genitourinary:  Negative for dysuria and hematuria.   Musculoskeletal:  Positive for back pain and joint pain.   Neurological:  Negative for dizziness and headaches.   Psychiatric/Behavioral:  Negative for memory loss. The patient does not have insomnia.      OBJECTIVE:     Vital Signs:  Temp:  [98.1 °F (36.7  °C)-98.9 °F (37.2 °C)]   Pulse:  [72-87]   Resp:  [17-18]   BP: (105-120)/(71-75)   SpO2:  [98 %-100 %]     Physical Exam  Constitutional:       Appearance: She is ill-appearing.      Comments: Somnolent   HENT:      Head: Normocephalic and atraumatic.   Eyes:      Extraocular Movements: Extraocular movements intact.      Pupils: Pupils are equal, round, and reactive to light.   Cardiovascular:      Rate and Rhythm: Normal rate and regular rhythm.   Pulmonary:      Effort: Pulmonary effort is normal.      Breath sounds: Normal breath sounds.   Abdominal:      General: Abdomen is flat.      Palpations: Abdomen is soft.   Musculoskeletal:         General: Tenderness present. Normal range of motion.      Cervical back: Normal range of motion and neck supple.   Skin:     General: Skin is warm and dry.   Neurological:      General: No focal deficit present.      Mental Status: She is oriented to person, place, and time.   Psychiatric:         Mood and Affect: Mood normal.         Behavior: Behavior normal.       Laboratory:  CBC:   Recent Labs   Lab 10/09/24  0533   WBC 6.60   RBC 3.78*   HGB 8.8*   HCT 26.5*      MCV 70*   MCH 23.3*   MCHC 33.2     CMP:   Recent Labs   Lab 10/09/24  0533      CALCIUM 9.3   ALBUMIN 3.5   PROT 7.3      K 4.0   CO2 22*      BUN 10   CREATININE 1.0   ALKPHOS 70   ALT 24   AST 35   BILITOT 0.5         Diagnostic Results:  X-Ray: Reviewed      ASSESSMENT/PLAN:     #Sickle Cell Pain Crisis  #Sickle Cell Beta-Thalessemia      Recommendations:     - Monitor CBC and CMP and Reticulocyte daily. Hemoglobin S quant once.    - Continue folate supplementation.    - Restart Hydroxyurea at 1000 mg BID. Patient endorses compliance but it is not in her home medications, is unlikely to be taking.     - Please refer to outpatient hematology on discharge. Needs regular hematologist to consider other SSD meds (was on Endari briefly), therapeutic exchange, regular TTE and ophtho  exams.    - No indication for exchange transfusion at this time. Patient remains on room air and is afebrile. Chest x-ray reviewed, no opacity.     - No need for simple transfusion as her hemoglobin is at baseline.     - Although no need for exchange transfusion now, clinical status can quickly change. Steps should first be taken to stabilize patient. Following this contact hematology fellow on call. Of note, hematology is not needed to contact apheresis services. Recommend this if patient decompensates from respiratory or hemodynamic perspective     - IV hydration with normal saline to maintain euvolemia    - Advise early and aggressive treatment of pain with IV opioids and PCA if necessary to achieve early control of pain. This strategy has been shown to decrease length of stay frequency of acute pain episodes.     - The general amount of analgesic provided depends on amount needed to provide pain control during previous hospitalizations.    - When tapering of opioids: perform during the day time, taper 10-20% at a time and decrease the dose instead of increasing interval between doses. Convert to PO when IV dose is roughly equal to home dose.    - The gold standard for the assessment of pain is the patient's report. There are no combination of physical findings or laboratory tests that can be used to determine whether sickle cell disease patients are in pain. Hemoglobin stability and absence of hemolysis should not be used to justify withholding or under-dosing pain medications.         Discussed with Dr. Ramos.  We will continue to follow. Let us know if any questions.      Indio Ch, DO  Hematology/Oncology Fellow, PGY-IV   [facility]

## 2024-10-09 NOTE — NURSING
Report received, care assumed.  Patient awake in bed watching TV.  Requesting pain medication 9/10 on verbal pain scale.  Respirations even and unlabored, NDN.

## 2024-10-09 NOTE — ED NOTES
Assumed care of patient at this time. Patient is having a sickle cell crisis, port has been accessed. Patient is receiving fluids at this time.

## 2024-10-09 NOTE — SUBJECTIVE & OBJECTIVE
Interval History:   10/9: patient with pain consistent with her sickle cell crisis. Notes that she is overdue for exchange.     Review of Systems   Constitutional:  Positive for fatigue. Negative for chills and fever.   HENT:  Negative for congestion and rhinorrhea.    Respiratory:  Negative for cough, shortness of breath and wheezing.    Cardiovascular:  Negative for chest pain, palpitations and leg swelling.   Gastrointestinal:  Negative for abdominal distention, diarrhea, nausea and vomiting.   Genitourinary:  Negative for difficulty urinating, flank pain, frequency and hematuria.   Musculoskeletal:  Positive for arthralgias and back pain. Negative for myalgias.   Neurological:  Positive for tremors.   Psychiatric/Behavioral:  Negative for agitation and behavioral problems.      Objective:     Vital Signs (Most Recent):  Temp: 98.1 °F (36.7 °C) (10/09/24 1130)  Pulse: 74 (10/09/24 1130)  Resp: 18 (10/09/24 1323)  BP: 120/73 (10/09/24 1130)  SpO2: 98 % (10/09/24 1130) Vital Signs (24h Range):  Temp:  [98.1 °F (36.7 °C)-99.1 °F (37.3 °C)] 98.1 °F (36.7 °C)  Pulse:  [72-88] 74  Resp:  [17-20] 18  SpO2:  [98 %-100 %] 98 %  BP: (105-139)/(71-86) 120/73     Weight: 104.1 kg (229 lb 8 oz)  Body mass index is 41.98 kg/m².    Intake/Output Summary (Last 24 hours) at 10/9/2024 1328  Last data filed at 10/9/2024 0649  Gross per 24 hour   Intake 875 ml   Output --   Net 875 ml         Physical Exam  Vitals and nursing note reviewed.   Constitutional:       Appearance: Normal appearance.   HENT:      Head: Normocephalic and atraumatic.      Nose: Nose normal. No congestion.      Mouth/Throat:      Mouth: Mucous membranes are moist.      Pharynx: Oropharynx is clear.   Eyes:      Extraocular Movements: Extraocular movements intact.      Conjunctiva/sclera: Conjunctivae normal.      Pupils: Pupils are equal, round, and reactive to light.   Cardiovascular:      Rate and Rhythm: Normal rate and regular rhythm.      Pulses: Normal  pulses.      Heart sounds: Normal heart sounds. No murmur heard.  Pulmonary:      Effort: Pulmonary effort is normal. No respiratory distress.      Breath sounds: Normal breath sounds. No wheezing or rales.   Abdominal:      General: Abdomen is flat. Bowel sounds are normal. There is no distension.      Palpations: Abdomen is soft.      Tenderness: There is no abdominal tenderness. There is no guarding or rebound.   Musculoskeletal:         General: No tenderness.      Right lower leg: No edema.      Left lower leg: No edema.      Comments: Right hip non-tender to palpation  Full ROM to right hip  Spine without tenderness   Neurological:      General: No focal deficit present.      Mental Status: She is alert and oriented to person, place, and time.   Psychiatric:         Mood and Affect: Mood normal.         Behavior: Behavior normal.             Significant Labs: All pertinent labs within the past 24 hours have been reviewed.    Significant Imaging: I have reviewed all pertinent imaging results/findings within the past 24 hours.

## 2024-10-10 LAB
ALBUMIN SERPL BCP-MCNC: 3.5 G/DL (ref 3.5–5.2)
ALP SERPL-CCNC: 66 U/L (ref 55–135)
ALT SERPL W/O P-5'-P-CCNC: 20 U/L (ref 10–44)
ANION GAP SERPL CALC-SCNC: 6 MMOL/L (ref 8–16)
AST SERPL-CCNC: 29 U/L (ref 10–40)
BASOPHILS # BLD AUTO: 0.02 K/UL (ref 0–0.2)
BASOPHILS NFR BLD: 0.3 % (ref 0–1.9)
BILIRUB SERPL-MCNC: 0.6 MG/DL (ref 0.1–1)
BUN SERPL-MCNC: 14 MG/DL (ref 6–20)
CALCIUM SERPL-MCNC: 8.7 MG/DL (ref 8.7–10.5)
CHLORIDE SERPL-SCNC: 105 MMOL/L (ref 95–110)
CO2 SERPL-SCNC: 25 MMOL/L (ref 23–29)
CREAT SERPL-MCNC: 1.8 MG/DL (ref 0.5–1.4)
DIFFERENTIAL METHOD BLD: ABNORMAL
EOSINOPHIL # BLD AUTO: 0.6 K/UL (ref 0–0.5)
EOSINOPHIL NFR BLD: 9.1 % (ref 0–8)
ERYTHROCYTE [DISTWIDTH] IN BLOOD BY AUTOMATED COUNT: 19 % (ref 11.5–14.5)
EST. GFR  (NO RACE VARIABLE): 34.8 ML/MIN/1.73 M^2
FACT X PPP CHRO-ACNC: 0.92 IU/ML (ref 0.3–0.7)
GLUCOSE SERPL-MCNC: 105 MG/DL (ref 70–110)
HCT VFR BLD AUTO: 25.8 % (ref 37–48.5)
HGB BLD-MCNC: 8.5 G/DL (ref 12–16)
IMM GRANULOCYTES # BLD AUTO: 0.02 K/UL (ref 0–0.04)
IMM GRANULOCYTES NFR BLD AUTO: 0.3 % (ref 0–0.5)
LYMPHOCYTES # BLD AUTO: 2.6 K/UL (ref 1–4.8)
LYMPHOCYTES NFR BLD: 39.8 % (ref 18–48)
MCH RBC QN AUTO: 23.2 PG (ref 27–31)
MCHC RBC AUTO-ENTMCNC: 32.9 G/DL (ref 32–36)
MCV RBC AUTO: 71 FL (ref 82–98)
MONOCYTES # BLD AUTO: 0.6 K/UL (ref 0.3–1)
MONOCYTES NFR BLD: 9.3 % (ref 4–15)
NEUTROPHILS # BLD AUTO: 2.7 K/UL (ref 1.8–7.7)
NEUTROPHILS NFR BLD: 41.2 % (ref 38–73)
NRBC BLD-RTO: 2 /100 WBC
PLATELET # BLD AUTO: 240 K/UL (ref 150–450)
PMV BLD AUTO: 9.9 FL (ref 9.2–12.9)
POTASSIUM SERPL-SCNC: 4.5 MMOL/L (ref 3.5–5.1)
PROT SERPL-MCNC: 7.1 G/DL (ref 6–8.4)
RBC # BLD AUTO: 3.66 M/UL (ref 4–5.4)
SODIUM SERPL-SCNC: 136 MMOL/L (ref 136–145)
WBC # BLD AUTO: 6.45 K/UL (ref 3.9–12.7)

## 2024-10-10 PROCEDURE — 25000003 PHARM REV CODE 250: Performed by: HOSPITALIST

## 2024-10-10 PROCEDURE — 20600001 HC STEP DOWN PRIVATE ROOM

## 2024-10-10 PROCEDURE — 85025 COMPLETE CBC W/AUTO DIFF WBC: CPT | Performed by: PHYSICIAN ASSISTANT

## 2024-10-10 PROCEDURE — 85520 HEPARIN ASSAY: CPT | Performed by: INTERNAL MEDICINE

## 2024-10-10 PROCEDURE — 63600175 PHARM REV CODE 636 W HCPCS: Performed by: HOSPITALIST

## 2024-10-10 PROCEDURE — 80053 COMPREHEN METABOLIC PANEL: CPT | Performed by: PHYSICIAN ASSISTANT

## 2024-10-10 PROCEDURE — 63600175 PHARM REV CODE 636 W HCPCS: Performed by: PHYSICIAN ASSISTANT

## 2024-10-10 PROCEDURE — 25000003 PHARM REV CODE 250: Performed by: PHYSICIAN ASSISTANT

## 2024-10-10 RX ADMIN — GABAPENTIN 300 MG: 300 CAPSULE ORAL at 08:10

## 2024-10-10 RX ADMIN — DIPHENHYDRAMINE HYDROCHLORIDE 25 MG: 50 INJECTION, SOLUTION INTRAMUSCULAR; INTRAVENOUS at 12:10

## 2024-10-10 RX ADMIN — POLYETHYLENE GLYCOL 3350 17 G: 17 POWDER, FOR SOLUTION ORAL at 08:10

## 2024-10-10 RX ADMIN — ACETAMINOPHEN 1000 MG: 500 TABLET ORAL at 08:10

## 2024-10-10 RX ADMIN — ACETAMINOPHEN 1000 MG: 500 TABLET ORAL at 05:10

## 2024-10-10 RX ADMIN — DIPHENHYDRAMINE HYDROCHLORIDE 25 MG: 50 INJECTION, SOLUTION INTRAMUSCULAR; INTRAVENOUS at 04:10

## 2024-10-10 RX ADMIN — DIPHENHYDRAMINE HYDROCHLORIDE 25 MG: 50 INJECTION, SOLUTION INTRAMUSCULAR; INTRAVENOUS at 09:10

## 2024-10-10 RX ADMIN — ALPRAZOLAM 1 MG: 0.5 TABLET ORAL at 08:10

## 2024-10-10 RX ADMIN — METHOCARBAMOL 750 MG: 750 TABLET ORAL at 12:10

## 2024-10-10 RX ADMIN — AMLODIPINE BESYLATE 10 MG: 10 TABLET ORAL at 08:10

## 2024-10-10 RX ADMIN — MORPHINE SULFATE 30 MG: 30 TABLET, FILM COATED, EXTENDED RELEASE ORAL at 08:10

## 2024-10-10 RX ADMIN — SENNOSIDES AND DOCUSATE SODIUM 1 TABLET: 50; 8.6 TABLET ORAL at 08:10

## 2024-10-10 RX ADMIN — HYDROXYUREA 1000 MG: 500 CAPSULE ORAL at 08:10

## 2024-10-10 RX ADMIN — GABAPENTIN 300 MG: 300 CAPSULE ORAL at 05:10

## 2024-10-10 RX ADMIN — HYDROMORPHONE HYDROCHLORIDE 3 MG: 2 INJECTION INTRAMUSCULAR; INTRAVENOUS; SUBCUTANEOUS at 12:10

## 2024-10-10 RX ADMIN — HYDROMORPHONE HYDROCHLORIDE 3 MG: 2 INJECTION INTRAMUSCULAR; INTRAVENOUS; SUBCUTANEOUS at 05:10

## 2024-10-10 RX ADMIN — CARVEDILOL 25 MG: 25 TABLET, FILM COATED ORAL at 08:10

## 2024-10-10 RX ADMIN — FLUOXETINE HYDROCHLORIDE 40 MG: 20 CAPSULE ORAL at 08:10

## 2024-10-10 RX ADMIN — DIPHENHYDRAMINE HYDROCHLORIDE 25 MG: 50 INJECTION, SOLUTION INTRAMUSCULAR; INTRAVENOUS at 05:10

## 2024-10-10 RX ADMIN — DIPHENHYDRAMINE HYDROCHLORIDE 25 MG: 50 INJECTION, SOLUTION INTRAMUSCULAR; INTRAVENOUS at 08:10

## 2024-10-10 RX ADMIN — ENOXAPARIN SODIUM 60 MG: 60 INJECTION SUBCUTANEOUS at 08:10

## 2024-10-10 RX ADMIN — METHOCARBAMOL 750 MG: 750 TABLET ORAL at 08:10

## 2024-10-10 RX ADMIN — HYDROMORPHONE HYDROCHLORIDE 3 MG: 2 INJECTION INTRAMUSCULAR; INTRAVENOUS; SUBCUTANEOUS at 09:10

## 2024-10-10 RX ADMIN — HYDROMORPHONE HYDROCHLORIDE 3 MG: 2 INJECTION INTRAMUSCULAR; INTRAVENOUS; SUBCUTANEOUS at 04:10

## 2024-10-10 RX ADMIN — HYDROMORPHONE HYDROCHLORIDE 3 MG: 2 INJECTION INTRAMUSCULAR; INTRAVENOUS; SUBCUTANEOUS at 08:10

## 2024-10-10 RX ADMIN — ALPRAZOLAM 1 MG: 0.5 TABLET ORAL at 09:10

## 2024-10-10 RX ADMIN — FOLIC ACID 1 MG: 1 TABLET ORAL at 08:10

## 2024-10-10 NOTE — PROGRESS NOTES
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 She does have ++ stress and anxiety in her life due to mother having developed alheimers/dementia in past year who lives with her, as well as she is raising her 4 y old granddaughter whose mom is not able to care for and has a hx of drug use and is currently living on 'the street'. She also lost her grandson in the last two years from SIDS,  in the bed next to her. 'its been a lot'. Has no issues with paying bills etc. 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

## 2024-10-10 NOTE — SUBJECTIVE & OBJECTIVE
Interval History: C/W same pain regimen today.    Review of Systems  Objective:     Vital Signs (Most Recent):  Temp: 97.5 °F (36.4 °C) (10/10/24 0737)  Pulse: 67 (10/10/24 0737)  Resp: 20 (10/10/24 0841)  BP: 120/74 (10/10/24 0737)  SpO2: (!) 94 % (10/10/24 0737) Vital Signs (24h Range):  Temp:  [97.3 °F (36.3 °C)-98.8 °F (37.1 °C)] 97.5 °F (36.4 °C)  Pulse:  [65-78] 67  Resp:  [16-20] 20  SpO2:  [93 %-98 %] 94 %  BP: ()/(57-89) 120/74     Weight: 104.1 kg (229 lb 8 oz)  Body mass index is 41.98 kg/m².    Intake/Output Summary (Last 24 hours) at 10/10/2024 0920  Last data filed at 10/9/2024 1328  Gross per 24 hour   Intake 240 ml   Output --   Net 240 ml         Physical Exam  Vitals and nursing note reviewed.   Constitutional:       Appearance: Normal appearance.   HENT:      Head: Normocephalic and atraumatic.      Nose: Nose normal. No congestion.      Mouth/Throat:      Mouth: Mucous membranes are moist.      Pharynx: Oropharynx is clear.   Eyes:      Extraocular Movements: Extraocular movements intact.      Conjunctiva/sclera: Conjunctivae normal.      Pupils: Pupils are equal, round, and reactive to light.   Cardiovascular:      Rate and Rhythm: Normal rate and regular rhythm.      Pulses: Normal pulses.      Heart sounds: Normal heart sounds. No murmur heard.  Pulmonary:      Effort: Pulmonary effort is normal. No respiratory distress.      Breath sounds: Normal breath sounds. No wheezing or rales.   Abdominal:      General: Abdomen is flat. Bowel sounds are normal. There is no distension.      Palpations: Abdomen is soft.      Tenderness: There is no abdominal tenderness. There is no guarding or rebound.   Musculoskeletal:         General: No tenderness.      Right lower leg: No edema.      Left lower leg: No edema.      Comments: Right hip non-tender to palpation  Full ROM to right hip  Spine without tenderness   Neurological:      General: No focal deficit present.      Mental Status: She is alert  and oriented to person, place, and time.   Psychiatric:         Mood and Affect: Mood normal.         Behavior: Behavior normal.             Significant Labs: All pertinent labs within the past 24 hours have been reviewed.  BMP:   Recent Labs   Lab 10/10/24  0457         K 4.5      CO2 25   BUN 14   CREATININE 1.8*   CALCIUM 8.7       Significant Imaging: I have reviewed all pertinent imaging results/findings within the past 24 hours.

## 2024-10-10 NOTE — PLAN OF CARE
[facility] - Telemetry Stepdown  Initial Discharge Assessment       Primary Care Provider: Pee Montgomery MD    Admission Diagnosis: Pain [R52]  Myoclonic jerking [G25.3]  History of sickle cell anemia [Z86.2]    Admission Date: 10/8/2024  Expected Discharge Date: 10/12/2024    Transition of Care Barriers: None    Payor: [employer]ET[employer] MANAGED MEDICARE / Plan: [employer]ET[employer] MEDICARE PLAN HMO / Product Type: Medicare Advantage /     Extended Emergency Contact Information  Primary Emergency Contact: Jack Malone  Mobile Phone: [phone]  Relation: Brother    Discharge Plan A: Home with family  Discharge Plan B: Home with family      [id number] [place], [place] - [address]  [address]  [facility] [address]  Phone: [phone] Fax: [phone]    [facility]  [address] [facility] [address]  Phone: [phone] Fax: [phone]    [facility]  [address] [facility] [address]  [facility] [address]  Phone: [phone] Fax: [phone]    [id number] [place] [place] - [address]  [address]  [place] [address]  Phone: [phone] Fax: [phone]    [id number] [place], [place] - [address] [facility]  [address]  [place] [address]  Phone: [phone] Fax: [phone]    [id number] [place] [place] - [address]  [address]  [place] [address]  Phone: [phone] Fax: [phone]            CM met with patient in room for Discharge Planning Assessment.  Patient able to answer questions.  Per patient, she lives with Trang Sampson (mother) [phone]  in a 1-story home with 3 step(s) to enter residence.   Per patient, she was independent with ADLS and used no DME for ambulation. Per patient, she is not on dialysis, and does not take Coumadin. PCP and pharmacy verified. Patient does not have home health, and has been hospitalized in past 30 days.  Patient will have help from Trang  Adrián (mother) [phone]  upon discharge.   Discharge Planning discussed discussed with patient in room.  All questions addressed.  CM will follow for needs.    Discharge Plan A and Plan B have been determined by review of patient's clinical status, future medical and therapeutic needs, and coverage/benefits for post-acute care in coordination with multidisciplinary team members.      Initial Assessment (most recent)       Adult Discharge Assessment - 10/10/24 1542          Discharge Assessment    Assessment Type Discharge Planning Assessment     Confirmed/corrected address, phone number and insurance Yes     Confirmed Demographics Correct on Facesheet     Source of Information patient     When was your last doctors appointment? 08/16/24     Does patient/caregiver understand observation status Yes     Communicated ALYSE with patient/caregiver Date not available/Unable to determine     Reason For Admission Sickle cell anemia with crisis     People in Home parent(s)     Facility Arrived From: Home     Do you expect to return to your current living situation? Yes     Do you have help at home or someone to help you manage your care at home? Yes     Who are your caregiver(s) and their phone number(s)? Trang Sampson (mother) [phone]     Prior to hospitilization cognitive status: Alert/Oriented     Current cognitive status: Alert/Oriented   very sleepy    Walking or Climbing Stairs Difficulty no     Dressing/Bathing Difficulty no     Equipment Currently Used at Home none     Readmission within 30 days? Yes     Patient currently being followed by outpatient case management? No     Do you currently have service(s) that help you manage your care at home? No     Do you take prescription medications? Yes     Do you have prescription coverage? Yes     Coverage AETNA MANAGED MEDICARE - AETNA MEDICARE PLAN HMO     Do you have any problems affording any of your prescribed medications? No     Is the patient taking medications  as prescribed? yes     Who is going to help you get home at discharge? Trang Sampson (mother) [phone]     How do you get to doctors appointments? car, drives self     Are you on dialysis? No     Do you take coumadin? No     Discharge Plan A Home with family     Discharge Plan B Home with family     DME Needed Upon Discharge  other (see comments)   TBD    Discharge Plan discussed with: Patient     Transition of Care Barriers None        Physical Activity    On average, how many days per week do you engage in moderate to strenuous exercise (like a brisk walk)? 0 days     On average, how many minutes do you engage in exercise at this level? 0 min        Financial Resource Strain    How hard is it for you to pay for the very basics like food, housing, medical care, and heating? Not very hard        Housing Stability    In the last 12 months, was there a time when you were not able to pay the mortgage or rent on time? No     At any time in the past 12 months, were you homeless or living in a shelter (including now)? No        Transportation Needs    Has the lack of transportation kept you from medical appointments, meetings, work or from getting things needed for daily living? No        Food Insecurity    Within the past 12 months, you worried that your food would run out before you got the money to buy more. Never true     Within the past 12 months, the food you bought just didn't last and you didn't have money to get more. Never true        Stress    Do you feel stress - tense, restless, nervous, or anxious, or unable to sleep at night because your mind is troubled all the time - these days? Rather much        Social Isolation    How often do you feel lonely or isolated from those around you?  Rarely        Alcohol Use    Q1: How often do you have a drink containing alcohol? Never     Q2: How many drinks containing alcohol do you have on a typical day when you are drinking? Patient does not drink     Q3: How often  do you have six or more drinks on one occasion? Never        Utilities    In the past 12 months has the electric, gas, oil, or water company threatened to shut off services in your home? No        Health Literacy    How often do you need to have someone help you when you read instructions, pamphlets, or other written material from your doctor or pharmacy? Never        OTHER    Name(s) of People in Home Trang Sampson (mother) [phone]                      Cuca Bradshaw RN     [phone]

## 2024-10-10 NOTE — PROGRESS NOTES
[facility] - Tele[facility]  Progress Note    Patient Name: Nazanin Malone  MRN: [id number]  Patient Class: IP- Inpatient   Admission Date: 10/8/2024  Length of Stay: 2 days  Attending Physician: Baltazar Barth MD  Primary Care Provider: Pee Montgomery MD        Subjective:     Principal Problem:Sickle cell anemia with crisis        HPI:  45 y/o F with hx of morbid obesity, HTN, anxiety, depression, sickle-cell - beta thalassemia, rhabdomyolysis with foot drop, carotid thrombosis, prior PE and DVT on lovenox who presents for worsening right arm/ right hip pain and new onset muscle spasms. Patient with recent admission for sickle cell pain crisis from 9/23-10/2 and discharged with 7 day script of MS Contin 30 mg BID. She reports some mild arm/hip pain with acute worsening yesterday evening. Pain is located in her joints, typical of her pain cell crisis. However, she also started with jerking in her RUE/ RLE that began around 7pm, which then spread to her whole body jerking. She said she took her last done of MS Contin, doubled her muscle relaxer and her home gabapentin (300 mg TID) without relief so she presented to the ED. She states since receiving pain medicine and pain control the jerking has gotten significantly better. No apparent jerking on exam. She reports low back pain with sharp, shooting pain into her low back associated with the jerking. She is non-tender to hips or back. She does endorse a fall while coming down her steps yesterday 2/2 to her foot drop. She landed on her butt without head trauma, LOC, did not notice worsening pain after fall. Of note, patient is under significant stress 2/2 to early onset Alzheimer's in her mother who just returned from the hospital. No fever, chills, CP, SOB, neck pain, N/V, C/D, urinary symptoms, LE edema, numbness, tingling.         In ED, hypertensive otherwise VSS. Afebrile without leukocytosis.  Hgb 9.3 (higher than baseline), retic  0.6. Labs grossly unremarkable. Patient given Dilaudid X 3, 1L IVF, droperidol, diphenhydramine, and K/Mg replacement. Patient admitted to hospital medicine.    Overview/Hospital Course:  No notes on file    Interval History: C/W same pain regimen today.    Review of Systems  Objective:     Vital Signs (Most Recent):  Temp: 97.5 °F (36.4 °C) (10/10/24 0737)  Pulse: 67 (10/10/24 0737)  Resp: 20 (10/10/24 0841)  BP: 120/74 (10/10/24 0737)  SpO2: (!) 94 % (10/10/24 0737) Vital Signs (24h Range):  Temp:  [97.3 °F (36.3 °C)-98.8 °F (37.1 °C)] 97.5 °F (36.4 °C)  Pulse:  [65-78] 67  Resp:  [16-20] 20  SpO2:  [93 %-98 %] 94 %  BP: ()/(57-89) 120/74     Weight: 104.1 kg (229 lb 8 oz)  Body mass index is 41.98 kg/m².    Intake/Output Summary (Last 24 hours) at 10/10/2024 0920  Last data filed at 10/9/2024 1328  Gross per 24 hour   Intake 240 ml   Output --   Net 240 ml         Physical Exam  Vitals and nursing note reviewed.   Constitutional:       Appearance: Normal appearance.   HENT:      Head: Normocephalic and atraumatic.      Nose: Nose normal. No congestion.      Mouth/Throat:      Mouth: Mucous membranes are moist.      Pharynx: Oropharynx is clear.   Eyes:      Extraocular Movements: Extraocular movements intact.      Conjunctiva/sclera: Conjunctivae normal.      Pupils: Pupils are equal, round, and reactive to light.   Cardiovascular:      Rate and Rhythm: Normal rate and regular rhythm.      Pulses: Normal pulses.      Heart sounds: Normal heart sounds. No murmur heard.  Pulmonary:      Effort: Pulmonary effort is normal. No respiratory distress.      Breath sounds: Normal breath sounds. No wheezing or rales.   Abdominal:      General: Abdomen is flat. Bowel sounds are normal. There is no distension.      Palpations: Abdomen is soft.      Tenderness: There is no abdominal tenderness. There is no guarding or rebound.   Musculoskeletal:         General: No tenderness.      Right lower leg: No edema.      Left  lower leg: No edema.      Comments: Right hip non-tender to palpation  Full ROM to right hip  Spine without tenderness   Neurological:      General: No focal deficit present.      Mental Status: She is alert and oriented to person, place, and time.   Psychiatric:         Mood and Affect: Mood normal.         Behavior: Behavior normal.             Significant Labs: All pertinent labs within the past 24 hours have been reviewed.  BMP:   Recent Labs   Lab 10/10/24  0457         K 4.5      CO2 25   BUN 14   CREATININE 1.8*   CALCIUM 8.7       Significant Imaging: I have reviewed all pertinent imaging results/findings within the past 24 hours.    Assessment/Plan:      * Sickle cell anemia with crisis  Patient presents with RUE/ R hip pain with acute worsening yesterday evening with new associated muscle spasms/jerking. Not resolved with home PO pain medications. Hgb baseline, retic 0.6. Jerking movements improved with pain control. No movement disorder seen on exam.    - VSS  - pain control with scheduled tylenol 1000 mg TID, MS Contin 30 mg BID, gabapentin 300 mg TID, home oxycodone 15 mg Q4h PRN, dilaudid 1 mg q4h breakthrough pain  - IVF  - monitor with daily labs  - needs close follow up with heme/onc - consulted as she is overdue for maintenance exchange.     Muscle spasm  - unclear source  - do not suspect medication side effect (gabapentin, morphine, MS Contin, tizanadine)  - improved with pain control per patient  - start methocarbamol 750 mg TID  - fall precautions       QT prolongation  - Qtc 504  - M g 1.9, replaced IV  - avoid Qtc prolongating medications     History of pulmonary embolism  - continue lovenox injections BID       Severe obesity (BMI >= 40)  Body mass index is 41.98 kg/m². Morbid obesity complicates all aspects of disease management from diagnostic modalities to treatment. Weight loss encouraged and health benefits explained to patient.         Hypertension  Patients blood  pressure range in the last 24 hours was: BP  Min: 163/99  Max: 180/92.The patient's inpatient anti-hypertensive regimen is listed below:  Current Antihypertensives  amLODIPine tablet 10 mg, Daily, Oral  carvediloL tablet 25 mg, 2 times daily, Oral    Plan  - BP is controlled, no changes needed to their regimen  - cardiac diet      VTE Risk Mitigation (From admission, onward)           Ordered     enoxaparin injection 60 mg  Every 12 hours         10/08/24 1336     IP VTE HIGH RISK PATIENT  Once         10/08/24 1336     Place sequential compression device  Until discontinued         10/08/24 1336     Place sequential compression device  Until discontinued         10/08/24 1241                    Discharge Planning   ALYSE: 10/12/2024     Code Status: Full Code   Is the patient medically ready for discharge?:     Reason for patient still in hospital (select all that apply): Patient unstable                     Baltazar Barth MD  Department of Hospital Medicine   [facility] - Tele[facility] Step[facility]

## 2024-10-11 LAB
ALBUMIN SERPL BCP-MCNC: 3.3 G/DL (ref 3.5–5.2)
ALP SERPL-CCNC: 66 U/L (ref 55–135)
ALT SERPL W/O P-5'-P-CCNC: 19 U/L (ref 10–44)
ANION GAP SERPL CALC-SCNC: 7 MMOL/L (ref 8–16)
AST SERPL-CCNC: 27 U/L (ref 10–40)
BASOPHILS # BLD AUTO: 0.03 K/UL (ref 0–0.2)
BASOPHILS NFR BLD: 0.5 % (ref 0–1.9)
BILIRUB SERPL-MCNC: 0.4 MG/DL (ref 0.1–1)
BUN SERPL-MCNC: 15 MG/DL (ref 6–20)
CALCIUM SERPL-MCNC: 9.3 MG/DL (ref 8.7–10.5)
CHLORIDE SERPL-SCNC: 109 MMOL/L (ref 95–110)
CO2 SERPL-SCNC: 24 MMOL/L (ref 23–29)
CREAT SERPL-MCNC: 1.3 MG/DL (ref 0.5–1.4)
DIFFERENTIAL METHOD BLD: ABNORMAL
EOSINOPHIL # BLD AUTO: 0.6 K/UL (ref 0–0.5)
EOSINOPHIL NFR BLD: 9.4 % (ref 0–8)
ERYTHROCYTE [DISTWIDTH] IN BLOOD BY AUTOMATED COUNT: 19 % (ref 11.5–14.5)
EST. GFR  (NO RACE VARIABLE): 51.4 ML/MIN/1.73 M^2
GLUCOSE SERPL-MCNC: 115 MG/DL (ref 70–110)
HCT VFR BLD AUTO: 24.5 % (ref 37–48.5)
HGB BLD-MCNC: 8.1 G/DL (ref 12–16)
IMM GRANULOCYTES # BLD AUTO: 0.01 K/UL (ref 0–0.04)
IMM GRANULOCYTES NFR BLD AUTO: 0.2 % (ref 0–0.5)
LYMPHOCYTES # BLD AUTO: 2.7 K/UL (ref 1–4.8)
LYMPHOCYTES NFR BLD: 44.5 % (ref 18–48)
MCH RBC QN AUTO: 23.5 PG (ref 27–31)
MCHC RBC AUTO-ENTMCNC: 33.1 G/DL (ref 32–36)
MCV RBC AUTO: 71 FL (ref 82–98)
MONOCYTES # BLD AUTO: 0.7 K/UL (ref 0.3–1)
MONOCYTES NFR BLD: 11.3 % (ref 4–15)
NEUTROPHILS # BLD AUTO: 2.1 K/UL (ref 1.8–7.7)
NEUTROPHILS NFR BLD: 34.1 % (ref 38–73)
NRBC BLD-RTO: 2 /100 WBC
PLATELET # BLD AUTO: 238 K/UL (ref 150–450)
PMV BLD AUTO: 10.3 FL (ref 9.2–12.9)
POTASSIUM SERPL-SCNC: 4.2 MMOL/L (ref 3.5–5.1)
PROT SERPL-MCNC: 6.9 G/DL (ref 6–8.4)
RBC # BLD AUTO: 3.44 M/UL (ref 4–5.4)
SODIUM SERPL-SCNC: 140 MMOL/L (ref 136–145)
WBC # BLD AUTO: 6.09 K/UL (ref 3.9–12.7)

## 2024-10-11 PROCEDURE — 25000003 PHARM REV CODE 250: Performed by: HOSPITALIST

## 2024-10-11 PROCEDURE — 25000003 PHARM REV CODE 250: Performed by: PHYSICIAN ASSISTANT

## 2024-10-11 PROCEDURE — 63600175 PHARM REV CODE 636 W HCPCS: Performed by: PHYSICIAN ASSISTANT

## 2024-10-11 PROCEDURE — 85025 COMPLETE CBC W/AUTO DIFF WBC: CPT | Performed by: PHYSICIAN ASSISTANT

## 2024-10-11 PROCEDURE — 20600001 HC STEP DOWN PRIVATE ROOM

## 2024-10-11 PROCEDURE — 63600175 PHARM REV CODE 636 W HCPCS: Performed by: HOSPITALIST

## 2024-10-11 PROCEDURE — 80053 COMPREHEN METABOLIC PANEL: CPT | Performed by: PHYSICIAN ASSISTANT

## 2024-10-11 RX ADMIN — ALPRAZOLAM 1 MG: 0.5 TABLET ORAL at 01:10

## 2024-10-11 RX ADMIN — HYDROMORPHONE HYDROCHLORIDE 3 MG: 2 INJECTION INTRAMUSCULAR; INTRAVENOUS; SUBCUTANEOUS at 10:10

## 2024-10-11 RX ADMIN — ENOXAPARIN SODIUM 60 MG: 60 INJECTION SUBCUTANEOUS at 10:10

## 2024-10-11 RX ADMIN — FLUOXETINE HYDROCHLORIDE 40 MG: 20 CAPSULE ORAL at 10:10

## 2024-10-11 RX ADMIN — AMLODIPINE BESYLATE 10 MG: 10 TABLET ORAL at 10:10

## 2024-10-11 RX ADMIN — HYDROMORPHONE HYDROCHLORIDE 3 MG: 2 INJECTION INTRAMUSCULAR; INTRAVENOUS; SUBCUTANEOUS at 04:10

## 2024-10-11 RX ADMIN — SENNOSIDES AND DOCUSATE SODIUM 1 TABLET: 50; 8.6 TABLET ORAL at 10:10

## 2024-10-11 RX ADMIN — CARVEDILOL 25 MG: 25 TABLET, FILM COATED ORAL at 10:10

## 2024-10-11 RX ADMIN — HYDROMORPHONE HYDROCHLORIDE 3 MG: 2 INJECTION INTRAMUSCULAR; INTRAVENOUS; SUBCUTANEOUS at 01:10

## 2024-10-11 RX ADMIN — HYDROXYUREA 1000 MG: 500 CAPSULE ORAL at 10:10

## 2024-10-11 RX ADMIN — ALPRAZOLAM 1 MG: 0.5 TABLET ORAL at 06:10

## 2024-10-11 RX ADMIN — METHOCARBAMOL 750 MG: 750 TABLET ORAL at 04:10

## 2024-10-11 RX ADMIN — HYDROMORPHONE HYDROCHLORIDE 3 MG: 2 INJECTION INTRAMUSCULAR; INTRAVENOUS; SUBCUTANEOUS at 03:10

## 2024-10-11 RX ADMIN — GABAPENTIN 300 MG: 300 CAPSULE ORAL at 10:10

## 2024-10-11 RX ADMIN — HYDROMORPHONE HYDROCHLORIDE 3 MG: 2 INJECTION INTRAMUSCULAR; INTRAVENOUS; SUBCUTANEOUS at 08:10

## 2024-10-11 RX ADMIN — MORPHINE SULFATE 30 MG: 30 TABLET, FILM COATED, EXTENDED RELEASE ORAL at 10:10

## 2024-10-11 RX ADMIN — METHOCARBAMOL 750 MG: 750 TABLET ORAL at 01:10

## 2024-10-11 RX ADMIN — ACETAMINOPHEN 1000 MG: 500 TABLET ORAL at 04:10

## 2024-10-11 RX ADMIN — HYDROMORPHONE HYDROCHLORIDE 3 MG: 2 INJECTION INTRAMUSCULAR; INTRAVENOUS; SUBCUTANEOUS at 11:10

## 2024-10-11 RX ADMIN — ACETAMINOPHEN 1000 MG: 500 TABLET ORAL at 10:10

## 2024-10-11 RX ADMIN — DIPHENHYDRAMINE HYDROCHLORIDE 25 MG: 50 INJECTION, SOLUTION INTRAMUSCULAR; INTRAVENOUS at 03:10

## 2024-10-11 RX ADMIN — GABAPENTIN 300 MG: 300 CAPSULE ORAL at 04:10

## 2024-10-11 RX ADMIN — FOLIC ACID 1 MG: 1 TABLET ORAL at 09:10

## 2024-10-11 RX ADMIN — MORPHINE SULFATE 30 MG: 30 TABLET, FILM COATED, EXTENDED RELEASE ORAL at 09:10

## 2024-10-11 RX ADMIN — DIPHENHYDRAMINE HYDROCHLORIDE 25 MG: 50 INJECTION, SOLUTION INTRAMUSCULAR; INTRAVENOUS at 08:10

## 2024-10-11 RX ADMIN — ALPRAZOLAM 1 MG: 0.5 TABLET ORAL at 11:10

## 2024-10-11 RX ADMIN — HYDROMORPHONE HYDROCHLORIDE 3 MG: 2 INJECTION INTRAMUSCULAR; INTRAVENOUS; SUBCUTANEOUS at 06:10

## 2024-10-11 RX ADMIN — DIPHENHYDRAMINE HYDROCHLORIDE 25 MG: 50 INJECTION, SOLUTION INTRAMUSCULAR; INTRAVENOUS at 11:10

## 2024-10-11 RX ADMIN — METHOCARBAMOL 750 MG: 750 TABLET ORAL at 10:10

## 2024-10-11 RX ADMIN — DIPHENHYDRAMINE HYDROCHLORIDE 25 MG: 50 INJECTION, SOLUTION INTRAMUSCULAR; INTRAVENOUS at 01:10

## 2024-10-11 RX ADMIN — DIPHENHYDRAMINE HYDROCHLORIDE 25 MG: 50 INJECTION, SOLUTION INTRAMUSCULAR; INTRAVENOUS at 10:10

## 2024-10-11 RX ADMIN — DIPHENHYDRAMINE HYDROCHLORIDE 25 MG: 50 INJECTION, SOLUTION INTRAMUSCULAR; INTRAVENOUS at 04:10

## 2024-10-11 RX ADMIN — DIPHENHYDRAMINE HYDROCHLORIDE 25 MG: 50 INJECTION, SOLUTION INTRAMUSCULAR; INTRAVENOUS at 06:10

## 2024-10-11 NOTE — PLAN OF CARE
Patient AAOx4, able to make needs known to staff. Pain Manage meds admin per orders and effective. Anxious, o2 2L via nc applied. No apparent distress noted. Plan of care progressing.     Problem: Adult Inpatient Plan of Care  Goal: Plan of Care Review  Outcome: Progressing  Goal: Patient-Specific Goal (Individualized)  Outcome: Progressing  Goal: Absence of Hospital-Acquired Illness or Injury  Outcome: Progressing  Goal: Optimal Comfort and Wellbeing  Outcome: Progressing  Goal: Readiness for Transition of Care  Outcome: Progressing     Problem: Bariatric Environmental Safety  Goal: Safety Maintained with Care  Outcome: Progressing     Problem: Acute Kidney Injury/Impairment  Goal: Fluid and Electrolyte Balance  Outcome: Progressing  Goal: Improved Oral Intake  Outcome: Progressing  Goal: Effective Renal Function  Outcome: Progressing

## 2024-10-11 NOTE — PROGRESS NOTES
[facility] - Tele[facility]  Progress Note    Patient Name: Nazanin Malone  MRN: [id number]  Patient Class: IP- Inpatient   Admission Date: 10/8/2024  Length of Stay: 3 days  Attending Physician: Baltazar Barth MD  Primary Care Provider: Pee Montgomery MD        Subjective:     Principal Problem:Sickle cell anemia with crisis        HPI:  45 y/o F with hx of morbid obesity, HTN, anxiety, depression, sickle-cell - beta thalassemia, rhabdomyolysis with foot drop, carotid thrombosis, prior PE and DVT on lovenox who presents for worsening right arm/ right hip pain and new onset muscle spasms. Patient with recent admission for sickle cell pain crisis from 9/23-10/2 and discharged with 7 day script of MS Contin 30 mg BID. She reports some mild arm/hip pain with acute worsening yesterday evening. Pain is located in her joints, typical of her pain cell crisis. However, she also started with jerking in her RUE/ RLE that began around 7pm, which then spread to her whole body jerking. She said she took her last done of MS Contin, doubled her muscle relaxer and her home gabapentin (300 mg TID) without relief so she presented to the ED. She states since receiving pain medicine and pain control the jerking has gotten significantly better. No apparent jerking on exam. She reports low back pain with sharp, shooting pain into her low back associated with the jerking. She is non-tender to hips or back. She does endorse a fall while coming down her steps yesterday 2/2 to her foot drop. She landed on her butt without head trauma, LOC, did not notice worsening pain after fall. Of note, patient is under significant stress 2/2 to early onset Alzheimer's in her mother who just returned from the hospital. No fever, chills, CP, SOB, neck pain, N/V, C/D, urinary symptoms, LE edema, numbness, tingling.         In ED, hypertensive otherwise VSS. Afebrile without leukocytosis.  Hgb 9.3 (higher than baseline), retic  0.6. Labs grossly unremarkable. Patient given Dilaudid X 3, 1L IVF, droperidol, diphenhydramine, and K/Mg replacement. Patient admitted to hospital medicine.    Overview/Hospital Course:  No notes on file    Interval History: Plan on dc to home tomorrow    Review of Systems  Objective:     Vital Signs (Most Recent):  Temp: 98.9 °F (37.2 °C) (10/11/24 0828)  Pulse: 107 (10/11/24 0828)  Resp: 20 (10/11/24 0828)  BP: (!) 143/66 (10/11/24 0828)  SpO2: 99 % (10/11/24 0828) Vital Signs (24h Range):  Temp:  [97.5 °F (36.4 °C)-98.9 °F (37.2 °C)] 98.9 °F (37.2 °C)  Pulse:  [] 107  Resp:  [18-20] 20  SpO2:  [95 %-99 %] 99 %  BP: (110-143)/(66-84) 143/66     Weight: 104.1 kg (229 lb 8 oz)  Body mass index is 41.98 kg/m².  No intake or output data in the 24 hours ending 10/11/24 0906      Physical Exam  Vitals and nursing note reviewed.   Constitutional:       Appearance: Normal appearance.   HENT:      Head: Normocephalic and atraumatic.      Nose: Nose normal. No congestion.      Mouth/Throat:      Mouth: Mucous membranes are moist.      Pharynx: Oropharynx is clear.   Eyes:      Extraocular Movements: Extraocular movements intact.      Conjunctiva/sclera: Conjunctivae normal.      Pupils: Pupils are equal, round, and reactive to light.   Cardiovascular:      Rate and Rhythm: Normal rate and regular rhythm.      Pulses: Normal pulses.      Heart sounds: Normal heart sounds. No murmur heard.  Pulmonary:      Effort: Pulmonary effort is normal. No respiratory distress.      Breath sounds: Normal breath sounds. No wheezing or rales.   Abdominal:      General: Abdomen is flat. Bowel sounds are normal. There is no distension.      Palpations: Abdomen is soft.      Tenderness: There is no abdominal tenderness. There is no guarding or rebound.   Musculoskeletal:         General: No tenderness.      Right lower leg: No edema.      Left lower leg: No edema.      Comments: Right hip non-tender to palpation  Full ROM to right  hip  Spine without tenderness   Neurological:      General: No focal deficit present.      Mental Status: She is alert and oriented to person, place, and time.   Psychiatric:         Mood and Affect: Mood normal.         Behavior: Behavior normal.             Significant Labs: All pertinent labs within the past 24 hours have been reviewed.  BMP:   Recent Labs   Lab 10/11/24  0434   *      K 4.2      CO2 24   BUN 15   CREATININE 1.3   CALCIUM 9.3       Significant Imaging: I have reviewed all pertinent imaging results/findings within the past 24 hours.    Assessment/Plan:      * Sickle cell anemia with crisis  Patient presents with RUE/ R hip pain with acute worsening yesterday evening with new associated muscle spasms/jerking. Not resolved with home PO pain medications. Hgb baseline, retic 0.6. Jerking movements improved with pain control. No movement disorder seen on exam.    - VSS  - pain control with scheduled tylenol 1000 mg TID, MS Contin 30 mg BID, gabapentin 300 mg TID, home oxycodone 15 mg Q4h PRN, dilaudid 1 mg q4h breakthrough pain  - IVF  - monitor with daily labs  - needs close follow up with heme/onc - consulted as she is overdue for maintenance exchange.     Muscle spasm  - unclear source  - do not suspect medication side effect (gabapentin, morphine, MS Contin, tizanadine)  - improved with pain control per patient  - start methocarbamol 750 mg TID  - fall precautions       QT prolongation  - Qtc 504  - M g 1.9, replaced IV  - avoid Qtc prolongating medications     History of pulmonary embolism  - continue lovenox injections BID       Severe obesity (BMI >= 40)  Body mass index is 41.98 kg/m². Morbid obesity complicates all aspects of disease management from diagnostic modalities to treatment. Weight loss encouraged and health benefits explained to patient.         Hypertension  Patients blood pressure range in the last 24 hours was: BP  Min: 163/99  Max: 180/92.The patient's  inpatient anti-hypertensive regimen is listed below:  Current Antihypertensives  amLODIPine tablet 10 mg, Daily, Oral  carvediloL tablet 25 mg, 2 times daily, Oral    Plan  - BP is controlled, no changes needed to their regimen  - cardiac diet      VTE Risk Mitigation (From admission, onward)           Ordered     enoxaparin injection 60 mg  Every 12 hours         10/08/24 1336     IP VTE HIGH RISK PATIENT  Once         10/08/24 1336     Place sequential compression device  Until discontinued         10/08/24 1336     Place sequential compression device  Until discontinued         10/08/24 1241                    Discharge Planning   ALYSE: 10/12/2024     Code Status: Full Code   Is the patient medically ready for discharge?:     Reason for patient still in hospital (select all that apply): Patient unstable  Discharge Plan A: Home with family                  Baltazar Barth MD  Department of Hospital Medicine   [facility] - Tele[facility]

## 2024-10-11 NOTE — SUBJECTIVE & OBJECTIVE
Interval History: Plan on dc to home tomorrow    Review of Systems  Objective:     Vital Signs (Most Recent):  Temp: 98.9 °F (37.2 °C) (10/11/24 0828)  Pulse: 107 (10/11/24 0828)  Resp: 20 (10/11/24 0828)  BP: (!) 143/66 (10/11/24 0828)  SpO2: 99 % (10/11/24 0828) Vital Signs (24h Range):  Temp:  [97.5 °F (36.4 °C)-98.9 °F (37.2 °C)] 98.9 °F (37.2 °C)  Pulse:  [] 107  Resp:  [18-20] 20  SpO2:  [95 %-99 %] 99 %  BP: (110-143)/(66-84) 143/66     Weight: 104.1 kg (229 lb 8 oz)  Body mass index is 41.98 kg/m².  No intake or output data in the 24 hours ending 10/11/24 0906      Physical Exam  Vitals and nursing note reviewed.   Constitutional:       Appearance: Normal appearance.   HENT:      Head: Normocephalic and atraumatic.      Nose: Nose normal. No congestion.      Mouth/Throat:      Mouth: Mucous membranes are moist.      Pharynx: Oropharynx is clear.   Eyes:      Extraocular Movements: Extraocular movements intact.      Conjunctiva/sclera: Conjunctivae normal.      Pupils: Pupils are equal, round, and reactive to light.   Cardiovascular:      Rate and Rhythm: Normal rate and regular rhythm.      Pulses: Normal pulses.      Heart sounds: Normal heart sounds. No murmur heard.  Pulmonary:      Effort: Pulmonary effort is normal. No respiratory distress.      Breath sounds: Normal breath sounds. No wheezing or rales.   Abdominal:      General: Abdomen is flat. Bowel sounds are normal. There is no distension.      Palpations: Abdomen is soft.      Tenderness: There is no abdominal tenderness. There is no guarding or rebound.   Musculoskeletal:         General: No tenderness.      Right lower leg: No edema.      Left lower leg: No edema.      Comments: Right hip non-tender to palpation  Full ROM to right hip  Spine without tenderness   Neurological:      General: No focal deficit present.      Mental Status: She is alert and oriented to person, place, and time.   Psychiatric:         Mood and Affect: Mood normal.          Behavior: Behavior normal.             Significant Labs: All pertinent labs within the past 24 hours have been reviewed.  BMP:   Recent Labs   Lab 10/11/24  0434   *      K 4.2      CO2 24   BUN 15   CREATININE 1.3   CALCIUM 9.3       Significant Imaging: I have reviewed all pertinent imaging results/findings within the past 24 hours.

## 2024-10-12 LAB
ALBUMIN SERPL BCP-MCNC: 3 G/DL (ref 3.5–5.2)
ALP SERPL-CCNC: 62 U/L (ref 55–135)
ALT SERPL W/O P-5'-P-CCNC: 14 U/L (ref 10–44)
ANION GAP SERPL CALC-SCNC: 2 MMOL/L (ref 8–16)
AST SERPL-CCNC: 19 U/L (ref 10–40)
BASOPHILS # BLD AUTO: 0.04 K/UL (ref 0–0.2)
BASOPHILS NFR BLD: 0.6 % (ref 0–1.9)
BILIRUB SERPL-MCNC: 0.3 MG/DL (ref 0.1–1)
BUN SERPL-MCNC: 15 MG/DL (ref 6–20)
CALCIUM SERPL-MCNC: 9.2 MG/DL (ref 8.7–10.5)
CHLORIDE SERPL-SCNC: 110 MMOL/L (ref 95–110)
CO2 SERPL-SCNC: 25 MMOL/L (ref 23–29)
CREAT SERPL-MCNC: 1.2 MG/DL (ref 0.5–1.4)
DIFFERENTIAL METHOD BLD: ABNORMAL
EOSINOPHIL # BLD AUTO: 0.5 K/UL (ref 0–0.5)
EOSINOPHIL NFR BLD: 7.8 % (ref 0–8)
ERYTHROCYTE [DISTWIDTH] IN BLOOD BY AUTOMATED COUNT: 18.9 % (ref 11.5–14.5)
EST. GFR  (NO RACE VARIABLE): 56.5 ML/MIN/1.73 M^2
GLUCOSE SERPL-MCNC: 86 MG/DL (ref 70–110)
HCT VFR BLD AUTO: 23.1 % (ref 37–48.5)
HGB BLD-MCNC: 7.8 G/DL (ref 12–16)
IMM GRANULOCYTES # BLD AUTO: 0.01 K/UL (ref 0–0.04)
IMM GRANULOCYTES NFR BLD AUTO: 0.2 % (ref 0–0.5)
LYMPHOCYTES # BLD AUTO: 3.6 K/UL (ref 1–4.8)
LYMPHOCYTES NFR BLD: 57.6 % (ref 18–48)
MCH RBC QN AUTO: 24 PG (ref 27–31)
MCHC RBC AUTO-ENTMCNC: 33.8 G/DL (ref 32–36)
MCV RBC AUTO: 71 FL (ref 82–98)
MONOCYTES # BLD AUTO: 0.5 K/UL (ref 0.3–1)
MONOCYTES NFR BLD: 7.8 % (ref 4–15)
NEUTROPHILS # BLD AUTO: 1.6 K/UL (ref 1.8–7.7)
NEUTROPHILS NFR BLD: 26 % (ref 38–73)
NRBC BLD-RTO: 2 /100 WBC
PLATELET # BLD AUTO: 228 K/UL (ref 150–450)
PMV BLD AUTO: 9.8 FL (ref 9.2–12.9)
POTASSIUM SERPL-SCNC: 4.2 MMOL/L (ref 3.5–5.1)
PROT SERPL-MCNC: 6.4 G/DL (ref 6–8.4)
RBC # BLD AUTO: 3.25 M/UL (ref 4–5.4)
SODIUM SERPL-SCNC: 137 MMOL/L (ref 136–145)
WBC # BLD AUTO: 6.16 K/UL (ref 3.9–12.7)

## 2024-10-12 PROCEDURE — 63600175 PHARM REV CODE 636 W HCPCS: Performed by: PHYSICIAN ASSISTANT

## 2024-10-12 PROCEDURE — 80053 COMPREHEN METABOLIC PANEL: CPT | Performed by: PHYSICIAN ASSISTANT

## 2024-10-12 PROCEDURE — 25000003 PHARM REV CODE 250: Performed by: HOSPITALIST

## 2024-10-12 PROCEDURE — 63600175 PHARM REV CODE 636 W HCPCS: Performed by: INTERNAL MEDICINE

## 2024-10-12 PROCEDURE — 25000003 PHARM REV CODE 250: Performed by: PHYSICIAN ASSISTANT

## 2024-10-12 PROCEDURE — 20600001 HC STEP DOWN PRIVATE ROOM

## 2024-10-12 PROCEDURE — 63600175 PHARM REV CODE 636 W HCPCS: Performed by: HOSPITALIST

## 2024-10-12 PROCEDURE — 85025 COMPLETE CBC W/AUTO DIFF WBC: CPT | Performed by: PHYSICIAN ASSISTANT

## 2024-10-12 RX ORDER — HYDROMORPHONE HYDROCHLORIDE 2 MG/ML
3 INJECTION, SOLUTION INTRAMUSCULAR; INTRAVENOUS; SUBCUTANEOUS EVERY 6 HOURS PRN
Status: DISCONTINUED | OUTPATIENT
Start: 2024-10-12 | End: 2024-10-13 | Stop reason: HOSPADM

## 2024-10-12 RX ORDER — HYDROMORPHONE HYDROCHLORIDE 2 MG/ML
2 INJECTION, SOLUTION INTRAMUSCULAR; INTRAVENOUS; SUBCUTANEOUS ONCE
Status: DISCONTINUED | OUTPATIENT
Start: 2024-10-12 | End: 2024-10-13 | Stop reason: HOSPADM

## 2024-10-12 RX ORDER — DIPHENHYDRAMINE HYDROCHLORIDE 50 MG/ML
25 INJECTION INTRAMUSCULAR; INTRAVENOUS EVERY 6 HOURS PRN
Status: DISCONTINUED | OUTPATIENT
Start: 2024-10-12 | End: 2024-10-13 | Stop reason: HOSPADM

## 2024-10-12 RX ADMIN — OXYCODONE 15 MG: 5 TABLET ORAL at 12:10

## 2024-10-12 RX ADMIN — METHOCARBAMOL 750 MG: 750 TABLET ORAL at 08:10

## 2024-10-12 RX ADMIN — AMLODIPINE BESYLATE 10 MG: 10 TABLET ORAL at 08:10

## 2024-10-12 RX ADMIN — ALPRAZOLAM 1 MG: 0.5 TABLET ORAL at 09:10

## 2024-10-12 RX ADMIN — HYDROXYUREA 1000 MG: 500 CAPSULE ORAL at 08:10

## 2024-10-12 RX ADMIN — GABAPENTIN 300 MG: 300 CAPSULE ORAL at 02:10

## 2024-10-12 RX ADMIN — HYDROMORPHONE HYDROCHLORIDE 3 MG: 2 INJECTION INTRAMUSCULAR; INTRAVENOUS; SUBCUTANEOUS at 02:10

## 2024-10-12 RX ADMIN — HYDROXYUREA 1000 MG: 500 CAPSULE ORAL at 09:10

## 2024-10-12 RX ADMIN — CARVEDILOL 25 MG: 25 TABLET, FILM COATED ORAL at 09:10

## 2024-10-12 RX ADMIN — HYDROMORPHONE HYDROCHLORIDE 3 MG: 2 INJECTION INTRAMUSCULAR; INTRAVENOUS; SUBCUTANEOUS at 05:10

## 2024-10-12 RX ADMIN — DIPHENHYDRAMINE HYDROCHLORIDE 25 MG: 50 INJECTION INTRAMUSCULAR; INTRAVENOUS at 11:10

## 2024-10-12 RX ADMIN — POLYETHYLENE GLYCOL 3350 17 G: 17 POWDER, FOR SOLUTION ORAL at 08:10

## 2024-10-12 RX ADMIN — ACETAMINOPHEN 1000 MG: 500 TABLET ORAL at 02:10

## 2024-10-12 RX ADMIN — ACETAMINOPHEN 1000 MG: 500 TABLET ORAL at 08:10

## 2024-10-12 RX ADMIN — MORPHINE SULFATE 30 MG: 30 TABLET, FILM COATED, EXTENDED RELEASE ORAL at 08:10

## 2024-10-12 RX ADMIN — DIPHENHYDRAMINE HYDROCHLORIDE 25 MG: 50 INJECTION, SOLUTION INTRAMUSCULAR; INTRAVENOUS at 06:10

## 2024-10-12 RX ADMIN — SENNOSIDES AND DOCUSATE SODIUM 1 TABLET: 50; 8.6 TABLET ORAL at 09:10

## 2024-10-12 RX ADMIN — GABAPENTIN 300 MG: 300 CAPSULE ORAL at 09:10

## 2024-10-12 RX ADMIN — CARVEDILOL 25 MG: 25 TABLET, FILM COATED ORAL at 08:10

## 2024-10-12 RX ADMIN — METHOCARBAMOL 750 MG: 750 TABLET ORAL at 12:10

## 2024-10-12 RX ADMIN — DIPHENHYDRAMINE HYDROCHLORIDE 25 MG: 50 INJECTION, SOLUTION INTRAMUSCULAR; INTRAVENOUS at 05:10

## 2024-10-12 RX ADMIN — ENOXAPARIN SODIUM 60 MG: 60 INJECTION SUBCUTANEOUS at 09:10

## 2024-10-12 RX ADMIN — HYDROMORPHONE HYDROCHLORIDE 3 MG: 2 INJECTION INTRAMUSCULAR; INTRAVENOUS; SUBCUTANEOUS at 11:10

## 2024-10-12 RX ADMIN — DIPHENHYDRAMINE HYDROCHLORIDE 25 MG: 50 INJECTION, SOLUTION INTRAMUSCULAR; INTRAVENOUS at 10:10

## 2024-10-12 RX ADMIN — ENOXAPARIN SODIUM 60 MG: 60 INJECTION SUBCUTANEOUS at 08:10

## 2024-10-12 RX ADMIN — METHOCARBAMOL 750 MG: 750 TABLET ORAL at 05:10

## 2024-10-12 RX ADMIN — MORPHINE SULFATE 30 MG: 30 TABLET, FILM COATED, EXTENDED RELEASE ORAL at 09:10

## 2024-10-12 RX ADMIN — FLUOXETINE HYDROCHLORIDE 40 MG: 20 CAPSULE ORAL at 08:10

## 2024-10-12 RX ADMIN — ALPRAZOLAM 1 MG: 0.5 TABLET ORAL at 08:10

## 2024-10-12 RX ADMIN — METHOCARBAMOL 750 MG: 750 TABLET ORAL at 09:10

## 2024-10-12 RX ADMIN — HYDROMORPHONE HYDROCHLORIDE 3 MG: 2 INJECTION INTRAMUSCULAR; INTRAVENOUS; SUBCUTANEOUS at 10:10

## 2024-10-12 RX ADMIN — GABAPENTIN 300 MG: 300 CAPSULE ORAL at 08:10

## 2024-10-12 RX ADMIN — FOLIC ACID 1 MG: 1 TABLET ORAL at 08:10

## 2024-10-12 RX ADMIN — DIPHENHYDRAMINE HYDROCHLORIDE 25 MG: 50 INJECTION, SOLUTION INTRAMUSCULAR; INTRAVENOUS at 02:10

## 2024-10-12 RX ADMIN — HYDROMORPHONE HYDROCHLORIDE 3 MG: 2 INJECTION INTRAMUSCULAR; INTRAVENOUS; SUBCUTANEOUS at 06:10

## 2024-10-12 RX ADMIN — SENNOSIDES AND DOCUSATE SODIUM 1 TABLET: 50; 8.6 TABLET ORAL at 08:10

## 2024-10-12 NOTE — PLAN OF CARE
Problem: Adult Inpatient Plan of Care  Goal: Plan of Care Review  Outcome: Progressing  Goal: Patient-Specific Goal (Individualized)  Outcome: Progressing  Goal: Absence of Hospital-Acquired Illness or Injury  Outcome: Progressing  Goal: Optimal Comfort and Wellbeing  Outcome: Progressing  Goal: Readiness for Transition of Care  Outcome: Progressing     Problem: Bariatric Environmental Safety  Goal: Safety Maintained with Care  Outcome: Progressing     Problem: Acute Kidney Injury/Impairment  Goal: Fluid and Electrolyte Balance  Outcome: Progressing  Goal: Improved Oral Intake  Outcome: Progressing  Goal: Effective Renal Function  Outcome: Progressing     Problem: Pneumonia  Goal: Fluid Balance  Outcome: Progressing  Goal: Resolution of Infection Signs and Symptoms  Outcome: Progressing  Goal: Effective Oxygenation and Ventilation  Outcome: Progressing     Problem: Fall Injury Risk  Goal: Absence of Fall and Fall-Related Injury  Outcome: Progressing

## 2024-10-12 NOTE — PROGRESS NOTES
[facility] - Telemetry [facility] Medicine  Progress Note    Patient Name: Nazanin Malone  MRN: [id number]  Patient Class: IP- Inpatient   Admission Date: 10/8/2024  Length of Stay: 4 days  Attending Physician: Baltazar Barth MD  Primary Care Provider: Pee Montgomery MD        Subjective:     Principal Problem:Sickle cell anemia with crisis        HPI:  45 y/o F with hx of morbid obesity, HTN, anxiety, depression, sickle-cell - beta thalassemia, rhabdomyolysis with foot drop, carotid thrombosis, prior PE and DVT on lovenox who presents for worsening right arm/ right hip pain and new onset muscle spasms. Patient with recent admission for sickle cell pain crisis from 9/23-10/2 and discharged with 7 day script of MS Contin 30 mg BID. She reports some mild arm/hip pain with acute worsening yesterday evening. Pain is located in her joints, typical of her pain cell crisis. However, she also started with jerking in her RUE/ RLE that began around 7pm, which then spread to her whole body jerking. She said she took her last done of MS Contin, doubled her muscle relaxer and her home gabapentin (300 mg TID) without relief so she presented to the ED. She states since receiving pain medicine and pain control the jerking has gotten significantly better. No apparent jerking on exam. She reports low back pain with sharp, shooting pain into her low back associated with the jerking. She is non-tender to hips or back. She does endorse a fall while coming down her steps yesterday 2/2 to her foot drop. She landed on her butt without head trauma, LOC, did not notice worsening pain after fall. Of note, patient is under significant stress 2/2 to early onset Alzheimer's in her mother who just returned from the hospital. No fever, chills, CP, SOB, neck pain, N/V, C/D, urinary symptoms, LE edema, numbness, tingling.         In ED, hypertensive otherwise VSS. Afebrile without leukocytosis.  Hgb 9.3 (higher than baseline), retic  0.6. Labs grossly unremarkable. Patient given Dilaudid X 3, 1L IVF, droperidol, diphenhydramine, and K/Mg replacement. Patient admitted to hospital medicine.    Overview/Hospital Course:  No notes on file    Interval History: Plan on dc to home tomorrow.    Review of Systems  Objective:     Vital Signs (Most Recent):  Temp: 98.7 °F (37.1 °C) (10/12/24 0746)  Pulse: 91 (10/12/24 0746)  Resp: 18 (10/12/24 0813)  BP: 129/70 (10/12/24 0814)  SpO2: 99 % (10/12/24 0746) Vital Signs (24h Range):  Temp:  [96.9 °F (36.1 °C)-99.1 °F (37.3 °C)] 98.7 °F (37.1 °C)  Pulse:  [85-92] 91  Resp:  [14-20] 18  SpO2:  [98 %-100 %] 99 %  BP: (105-129)/(66-78) 129/70     Weight: 104.1 kg (229 lb 8 oz)  Body mass index is 41.98 kg/m².    Intake/Output Summary (Last 24 hours) at 10/12/2024 0914  Last data filed at 10/11/2024 2217  Gross per 24 hour   Intake 120 ml   Output --   Net 120 ml         Physical Exam  Vitals and nursing note reviewed.   Constitutional:       Appearance: Normal appearance.   HENT:      Head: Normocephalic and atraumatic.      Nose: Nose normal. No congestion.      Mouth/Throat:      Mouth: Mucous membranes are moist.      Pharynx: Oropharynx is clear.   Eyes:      Extraocular Movements: Extraocular movements intact.      Conjunctiva/sclera: Conjunctivae normal.      Pupils: Pupils are equal, round, and reactive to light.   Cardiovascular:      Rate and Rhythm: Normal rate and regular rhythm.      Pulses: Normal pulses.      Heart sounds: Normal heart sounds. No murmur heard.  Pulmonary:      Effort: Pulmonary effort is normal. No respiratory distress.      Breath sounds: Normal breath sounds. No wheezing or rales.   Abdominal:      General: Abdomen is flat. Bowel sounds are normal. There is no distension.      Palpations: Abdomen is soft.      Tenderness: There is no abdominal tenderness. There is no guarding or rebound.   Musculoskeletal:         General: No tenderness.      Right lower leg: No edema.      Left  lower leg: No edema.      Comments: Right hip non-tender to palpation  Full ROM to right hip  Spine without tenderness   Neurological:      General: No focal deficit present.      Mental Status: She is alert and oriented to person, place, and time.   Psychiatric:         Mood and Affect: Mood normal.         Behavior: Behavior normal.             Significant Labs: All pertinent labs within the past 24 hours have been reviewed.  BMP:   Recent Labs   Lab 10/12/24  0441   GLU 86      K 4.2      CO2 25   BUN 15   CREATININE 1.2   CALCIUM 9.2       Significant Imaging: I have reviewed all pertinent imaging results/findings within the past 24 hours.    Assessment/Plan:      * Sickle cell anemia with crisis  Patient presents with RUE/ R hip pain with acute worsening yesterday evening with new associated muscle spasms/jerking. Not resolved with home PO pain medications. Hgb baseline, retic 0.6. Jerking movements improved with pain control. No movement disorder seen on exam.    - VSS  - pain control with scheduled tylenol 1000 mg TID, MS Contin 30 mg BID, gabapentin 300 mg TID, home oxycodone 15 mg Q4h PRN, dilaudid 1 mg q4h breakthrough pain  - IVF  - monitor with daily labs  - needs close follow up with heme/onc - consulted as she is overdue for maintenance exchange.     Muscle spasm  - unclear source  - do not suspect medication side effect (gabapentin, morphine, MS Contin, tizanadine)  - improved with pain control per patient  - start methocarbamol 750 mg TID  - fall precautions       QT prolongation  - Qtc 504  - M g 1.9, replaced IV  - avoid Qtc prolongating medications     History of pulmonary embolism  - continue lovenox injections BID       Severe obesity (BMI >= 40)  Body mass index is 41.98 kg/m². Morbid obesity complicates all aspects of disease management from diagnostic modalities to treatment. Weight loss encouraged and health benefits explained to patient.         Hypertension  Patients blood  pressure range in the last 24 hours was: BP  Min: 163/99  Max: 180/92.The patient's inpatient anti-hypertensive regimen is listed below:  Current Antihypertensives  amLODIPine tablet 10 mg, Daily, Oral  carvediloL tablet 25 mg, 2 times daily, Oral    Plan  - BP is controlled, no changes needed to their regimen  - cardiac diet      VTE Risk Mitigation (From admission, onward)           Ordered     enoxaparin injection 60 mg  Every 12 hours         10/08/24 1336     IP VTE HIGH RISK PATIENT  Once         10/08/24 1336     Place sequential compression device  Until discontinued         10/08/24 1336     Place sequential compression device  Until discontinued         10/08/24 1241                    Discharge Planning   ALYSE: 10/12/2024     Code Status: Full Code   Is the patient medically ready for discharge?:     Reason for patient still in hospital (select all that apply): Patient unstable  Discharge Plan A: Home with family                  Baltazar Barth MD  Department of Hospital Medicine   [facility] - Telemetry Stepdown

## 2024-10-12 NOTE — SUBJECTIVE & OBJECTIVE
Interval History: Plan on dc to home tomorrow.    Review of Systems  Objective:     Vital Signs (Most Recent):  Temp: 98.7 °F (37.1 °C) (10/12/24 0746)  Pulse: 91 (10/12/24 0746)  Resp: 18 (10/12/24 0813)  BP: 129/70 (10/12/24 0814)  SpO2: 99 % (10/12/24 0746) Vital Signs (24h Range):  Temp:  [96.9 °F (36.1 °C)-99.1 °F (37.3 °C)] 98.7 °F (37.1 °C)  Pulse:  [85-92] 91  Resp:  [14-20] 18  SpO2:  [98 %-100 %] 99 %  BP: (105-129)/(66-78) 129/70     Weight: 104.1 kg (229 lb 8 oz)  Body mass index is 41.98 kg/m².    Intake/Output Summary (Last 24 hours) at 10/12/2024 0914  Last data filed at 10/11/2024 2217  Gross per 24 hour   Intake 120 ml   Output --   Net 120 ml         Physical Exam  Vitals and nursing note reviewed.   Constitutional:       Appearance: Normal appearance.   HENT:      Head: Normocephalic and atraumatic.      Nose: Nose normal. No congestion.      Mouth/Throat:      Mouth: Mucous membranes are moist.      Pharynx: Oropharynx is clear.   Eyes:      Extraocular Movements: Extraocular movements intact.      Conjunctiva/sclera: Conjunctivae normal.      Pupils: Pupils are equal, round, and reactive to light.   Cardiovascular:      Rate and Rhythm: Normal rate and regular rhythm.      Pulses: Normal pulses.      Heart sounds: Normal heart sounds. No murmur heard.  Pulmonary:      Effort: Pulmonary effort is normal. No respiratory distress.      Breath sounds: Normal breath sounds. No wheezing or rales.   Abdominal:      General: Abdomen is flat. Bowel sounds are normal. There is no distension.      Palpations: Abdomen is soft.      Tenderness: There is no abdominal tenderness. There is no guarding or rebound.   Musculoskeletal:         General: No tenderness.      Right lower leg: No edema.      Left lower leg: No edema.      Comments: Right hip non-tender to palpation  Full ROM to right hip  Spine without tenderness   Neurological:      General: No focal deficit present.      Mental Status: She is alert  and oriented to person, place, and time.   Psychiatric:         Mood and Affect: Mood normal.         Behavior: Behavior normal.             Significant Labs: All pertinent labs within the past 24 hours have been reviewed.  BMP:   Recent Labs   Lab 10/12/24  0441   GLU 86      K 4.2      CO2 25   BUN 15   CREATININE 1.2   CALCIUM 9.2       Significant Imaging: I have reviewed all pertinent imaging results/findings within the past 24 hours.

## 2024-10-12 NOTE — PLAN OF CARE
Problem: Adult Inpatient Plan of Care  Goal: Plan of Care Review  Outcome: Progressing  Flowsheets (Taken 10/12/2024 0250)  Plan of Care Reviewed With: patient  Goal: Patient-Specific Goal (Individualized)  Outcome: Progressing  Goal: Absence of Hospital-Acquired Illness or Injury  Outcome: Progressing  Intervention: Identify and Manage Fall Risk  Flowsheets (Taken 10/12/2024 0250)  Safety Promotion/Fall Prevention:   assistive device/personal item within reach   bed alarm refused   Fall Risk reviewed with patient/family   lighting adjusted   instructed to call staff for mobility   medications reviewed   nonskid shoes/socks when out of bed   patient expresses understanding of fall risk and prevention   room near unit station   side rails raised x 2  Intervention: Prevent Skin Injury  Flowsheets (Taken 10/12/2024 0250)  Body Position: position changed independently  Skin Protection: incontinence pads utilized  Device Skin Pressure Protection:   absorbent pad utilized/changed   tubing/devices free from skin contact  Intervention: Prevent and Manage VTE (Venous Thromboembolism) Risk  Flowsheets (Taken 10/12/2024 0250)  VTE Prevention/Management:   bleeding precautions maintained   ambulation promoted  Intervention: Prevent Infection  Flowsheets (Taken 10/12/2024 0250)  Infection Prevention:   environmental surveillance performed   hand hygiene promoted   single patient room provided   rest/sleep promoted  Goal: Optimal Comfort and Wellbeing  Outcome: Progressing  Intervention: Monitor Pain and Promote Comfort  Flowsheets (Taken 10/12/2024 0250)  Pain Management Interventions:   care clustered   awakened for pain meds per patient request   position adjusted   quiet environment facilitated   medication offered  Intervention: Provide Person-Centered Care  Flowsheets (Taken 10/12/2024 0250)  Trust Relationship/Rapport:   care explained   choices provided   empathic listening provided   questions answered   questions  encouraged       Patient awake, alert , oriented.  Pain assessed, PRN pain medication and Benadryl administered as ordered.  pt educated on safety, and medication use. Pt verbalized understanding.  Plan of care discussed with patient, patient verbalized understanding.  Call light within reach.  Bed alarm on.  Bed  low and locked.

## 2024-10-12 NOTE — NURSING
Report received from [facility].  Patient remains free from fall and injury. NAD noted, VSS, Questions encouraged and answered.  Patient verbalized understanding. Bed locked and in low position, Safety maintained. Call light in reach, white board updated and explained, no c/o pain n/v, diarrhea or sob, will cont with POC

## 2024-10-13 VITALS
BODY MASS INDEX: 42.23 KG/M2 | WEIGHT: 229.5 LBS | OXYGEN SATURATION: 99 % | RESPIRATION RATE: 19 BRPM | HEIGHT: 62 IN | TEMPERATURE: 99 F | SYSTOLIC BLOOD PRESSURE: 121 MMHG | HEART RATE: 89 BPM | DIASTOLIC BLOOD PRESSURE: 81 MMHG

## 2024-10-13 LAB
ALBUMIN SERPL BCP-MCNC: 3.1 G/DL (ref 3.5–5.2)
ALP SERPL-CCNC: 61 U/L (ref 55–135)
ALT SERPL W/O P-5'-P-CCNC: 13 U/L (ref 10–44)
ANION GAP SERPL CALC-SCNC: 7 MMOL/L (ref 8–16)
AST SERPL-CCNC: 17 U/L (ref 10–40)
BASOPHILS # BLD AUTO: 0.03 K/UL (ref 0–0.2)
BASOPHILS NFR BLD: 0.5 % (ref 0–1.9)
BILIRUB SERPL-MCNC: 0.3 MG/DL (ref 0.1–1)
BUN SERPL-MCNC: 11 MG/DL (ref 6–20)
CALCIUM SERPL-MCNC: 9 MG/DL (ref 8.7–10.5)
CHLORIDE SERPL-SCNC: 109 MMOL/L (ref 95–110)
CO2 SERPL-SCNC: 25 MMOL/L (ref 23–29)
CREAT SERPL-MCNC: 1.2 MG/DL (ref 0.5–1.4)
DIFFERENTIAL METHOD BLD: ABNORMAL
EOSINOPHIL # BLD AUTO: 0.5 K/UL (ref 0–0.5)
EOSINOPHIL NFR BLD: 7.4 % (ref 0–8)
ERYTHROCYTE [DISTWIDTH] IN BLOOD BY AUTOMATED COUNT: 18.9 % (ref 11.5–14.5)
EST. GFR  (NO RACE VARIABLE): 56.5 ML/MIN/1.73 M^2
GLUCOSE SERPL-MCNC: 85 MG/DL (ref 70–110)
HCT VFR BLD AUTO: 24 % (ref 37–48.5)
HGB BLD-MCNC: 7.9 G/DL (ref 12–16)
IMM GRANULOCYTES # BLD AUTO: 0.01 K/UL (ref 0–0.04)
IMM GRANULOCYTES NFR BLD AUTO: 0.2 % (ref 0–0.5)
LYMPHOCYTES # BLD AUTO: 3 K/UL (ref 1–4.8)
LYMPHOCYTES NFR BLD: 46.4 % (ref 18–48)
MCH RBC QN AUTO: 23.4 PG (ref 27–31)
MCHC RBC AUTO-ENTMCNC: 32.9 G/DL (ref 32–36)
MCV RBC AUTO: 71 FL (ref 82–98)
MONOCYTES # BLD AUTO: 0.6 K/UL (ref 0.3–1)
MONOCYTES NFR BLD: 8.8 % (ref 4–15)
NEUTROPHILS # BLD AUTO: 2.4 K/UL (ref 1.8–7.7)
NEUTROPHILS NFR BLD: 36.7 % (ref 38–73)
NRBC BLD-RTO: 1 /100 WBC
PLATELET # BLD AUTO: 269 K/UL (ref 150–450)
PMV BLD AUTO: 10.3 FL (ref 9.2–12.9)
POTASSIUM SERPL-SCNC: 4 MMOL/L (ref 3.5–5.1)
PROT SERPL-MCNC: 6.5 G/DL (ref 6–8.4)
RBC # BLD AUTO: 3.37 M/UL (ref 4–5.4)
SODIUM SERPL-SCNC: 141 MMOL/L (ref 136–145)
WBC # BLD AUTO: 6.51 K/UL (ref 3.9–12.7)

## 2024-10-13 PROCEDURE — 25000003 PHARM REV CODE 250: Performed by: HOSPITALIST

## 2024-10-13 PROCEDURE — 63600175 PHARM REV CODE 636 W HCPCS: Performed by: INTERNAL MEDICINE

## 2024-10-13 PROCEDURE — 80053 COMPREHEN METABOLIC PANEL: CPT | Performed by: PHYSICIAN ASSISTANT

## 2024-10-13 PROCEDURE — 63600175 PHARM REV CODE 636 W HCPCS: Performed by: PHYSICIAN ASSISTANT

## 2024-10-13 PROCEDURE — 25000003 PHARM REV CODE 250: Performed by: PHYSICIAN ASSISTANT

## 2024-10-13 PROCEDURE — 85025 COMPLETE CBC W/AUTO DIFF WBC: CPT | Performed by: PHYSICIAN ASSISTANT

## 2024-10-13 RX ORDER — ALPRAZOLAM 0.25 MG/1
0.25 TABLET ORAL 2 TIMES DAILY PRN
Qty: 20 TABLET | Refills: 0 | Status: SHIPPED | OUTPATIENT
Start: 2024-10-13 | End: 2024-11-12

## 2024-10-13 RX ORDER — OXYCODONE HYDROCHLORIDE 15 MG/1
15 TABLET ORAL EVERY 4 HOURS PRN
Qty: 42 TABLET | Refills: 0 | Status: SHIPPED | OUTPATIENT
Start: 2024-10-13 | End: 2024-10-20

## 2024-10-13 RX ORDER — MORPHINE SULFATE 30 MG/1
30 TABLET, FILM COATED, EXTENDED RELEASE ORAL EVERY 12 HOURS
Qty: 14 TABLET | Refills: 0 | Status: SHIPPED | OUTPATIENT
Start: 2024-10-13 | End: 2024-10-20

## 2024-10-13 RX ADMIN — FLUOXETINE HYDROCHLORIDE 40 MG: 20 CAPSULE ORAL at 08:10

## 2024-10-13 RX ADMIN — CARVEDILOL 25 MG: 25 TABLET, FILM COATED ORAL at 08:10

## 2024-10-13 RX ADMIN — HYDROMORPHONE HYDROCHLORIDE 3 MG: 2 INJECTION INTRAMUSCULAR; INTRAVENOUS; SUBCUTANEOUS at 05:10

## 2024-10-13 RX ADMIN — ACETAMINOPHEN 1000 MG: 500 TABLET ORAL at 02:10

## 2024-10-13 RX ADMIN — ACETAMINOPHEN 1000 MG: 500 TABLET ORAL at 08:10

## 2024-10-13 RX ADMIN — SENNOSIDES AND DOCUSATE SODIUM 1 TABLET: 50; 8.6 TABLET ORAL at 08:10

## 2024-10-13 RX ADMIN — GABAPENTIN 300 MG: 300 CAPSULE ORAL at 02:10

## 2024-10-13 RX ADMIN — DIPHENHYDRAMINE HYDROCHLORIDE 25 MG: 50 INJECTION INTRAMUSCULAR; INTRAVENOUS at 05:10

## 2024-10-13 RX ADMIN — METHOCARBAMOL 750 MG: 750 TABLET ORAL at 02:10

## 2024-10-13 RX ADMIN — METHOCARBAMOL 750 MG: 750 TABLET ORAL at 08:10

## 2024-10-13 RX ADMIN — HYDROXYUREA 1000 MG: 500 CAPSULE ORAL at 08:10

## 2024-10-13 RX ADMIN — ENOXAPARIN SODIUM 60 MG: 60 INJECTION SUBCUTANEOUS at 08:10

## 2024-10-13 RX ADMIN — AMLODIPINE BESYLATE 10 MG: 10 TABLET ORAL at 08:10

## 2024-10-13 RX ADMIN — GABAPENTIN 300 MG: 300 CAPSULE ORAL at 08:10

## 2024-10-13 RX ADMIN — HYDROMORPHONE HYDROCHLORIDE 3 MG: 2 INJECTION INTRAMUSCULAR; INTRAVENOUS; SUBCUTANEOUS at 11:10

## 2024-10-13 RX ADMIN — POLYETHYLENE GLYCOL 3350 17 G: 17 POWDER, FOR SOLUTION ORAL at 08:10

## 2024-10-13 RX ADMIN — DIPHENHYDRAMINE HYDROCHLORIDE 25 MG: 50 INJECTION INTRAMUSCULAR; INTRAVENOUS at 11:10

## 2024-10-13 RX ADMIN — ALPRAZOLAM 1 MG: 0.5 TABLET ORAL at 08:10

## 2024-10-13 RX ADMIN — MORPHINE SULFATE 30 MG: 30 TABLET, FILM COATED, EXTENDED RELEASE ORAL at 08:10

## 2024-10-13 RX ADMIN — FOLIC ACID 1 MG: 1 TABLET ORAL at 08:10

## 2024-10-13 NOTE — NURSING
Patient discharged to home with family, patient escorted to car per nurse via wheelchair, patient AAOx4, VSS, ambulatory, NAD noted upon discharge

## 2024-10-13 NOTE — PLAN OF CARE
Patient discharged home w/ no needs.    THERESA CarmichaelN, RN, [facility]  Transitional Care Manager  [phone]  [email or web address]    [facility] - Telemetry Stepdown  Discharge Final Note    Primary Care Provider: Pee Montgomery MD    Expected Discharge Date: 10/13/2024    Final Discharge Note (most recent)       Final Note - 10/13/24 1646          Final Note    Assessment Type Final Discharge Note     Anticipated Discharge Disposition Home or Self Care        Post-Acute Status    Discharge Delays None known at this time                     Important Message from Medicare             Contact Info       Pee Montgomery MD   Specialty: Hematology and Oncology   Relationship: PCP - General    [address]  [facility] [address]   Phone: [phone]       Next Steps: Follow up

## 2024-10-13 NOTE — PLAN OF CARE
Problem: Adult Inpatient Plan of Care  Goal: Plan of Care Review  Outcome: Progressing  Flowsheets (Taken 10/13/2024 0244)  Plan of Care Reviewed With: patient  Goal: Absence of Hospital-Acquired Illness or Injury  Outcome: Progressing  Intervention: Identify and Manage Fall Risk  Flowsheets (Taken 10/13/2024 0244)  Safety Promotion/Fall Prevention:   assistive device/personal item within reach   bed alarm refused   Fall Risk reviewed with patient/family   instructed to call staff for mobility   lighting adjusted   medications reviewed   nonskid shoes/socks when out of bed   patient expresses understanding of fall risk and prevention   room near unit station   side rails raised x 2  Intervention: Prevent Skin Injury  Flowsheets (Taken 10/13/2024 0244)  Body Position: position changed independently  Skin Protection: incontinence pads utilized  Device Skin Pressure Protection: absorbent pad utilized/changed  Intervention: Prevent and Manage VTE (Venous Thromboembolism) Risk  Flowsheets (Taken 10/13/2024 0244)  VTE Prevention/Management:   ambulation promoted   bleeding precautions maintained  Intervention: Prevent Infection  Flowsheets (Taken 10/13/2024 0244)  Infection Prevention:   environmental surveillance performed   hand hygiene promoted   single patient room provided   rest/sleep promoted  Goal: Optimal Comfort and Wellbeing  Outcome: Progressing  Intervention: Monitor Pain and Promote Comfort  Flowsheets (Taken 10/13/2024 0244)  Pain Management Interventions:   quiet environment facilitated   care clustered   medication offered  Intervention: Provide Person-Centered Care  Flowsheets (Taken 10/13/2024 0244)  Trust Relationship/Rapport:   care explained   choices provided   empathic listening provided   questions answered   questions encouraged   reassurance provided     Patient awake, alert , oriented.  Pain assessed, PRN pain medication and Benadryl administered as ordered.  pt educated on safety, and  medication use. Pt verbalized understanding.  Plan of care discussed with patient, patient verbalized understanding.  Call light within reach.  Bed alarm on.  Bed  low and locked.

## 2024-10-13 NOTE — NURSING
Report received from [facility].  Patient remains free from fall and injury. NAD noted, VSS, Questions encouraged and answered.  Patient verbalized understanding. Bed locked and in low position, Safety maintained. Call light in reach, white board updated and explained, no c/o pain n/v, diarrhea or sob, will cont with POC

## 2024-10-14 NOTE — PROGRESS NOTES
Route Chart for Scheduling    BMT Chart Routing      Follow up with physician . micheal Rodriguez follow-up in 4-6 weeks   Follow up with [facility]    Provider visit type Benign hem   Infusion scheduling note    Injection scheduling note    Labs CMP and CBC   Scheduling:  Preferred lab:  Lab interval:     Imaging    Pharmacy appointment    Other referrals                  Supportive Plan Information  IV FLUIDS AND ELECTROLYTES Chelsea Jones NP   Associated Diagnosis: Iron deficiency anemia   noted on 2/21/2017  Associated Diagnosis: Sickle-cell disease with pain   noted on 4/16/2017  Associated Diagnosis: Sickle cell disease, type S beta-zero thalassemia with crisis   noted on 4/26/2019  Associated Diagnosis: Hb-SS disease with vaso-occlusive pain   noted on 7/30/2019   Line of treatment: Supportive Care   Treatment goal: Supportive     Upcoming Treatment Dates - IV FLUIDS AND ELECTROLYTES    No upcoming days in selected categories.

## 2024-10-15 DIAGNOSIS — D57.00 SICKLE-CELL DISEASE WITH PAIN: Primary | ICD-10-CM

## 2024-10-21 ENCOUNTER — HOSPITAL ENCOUNTER (OUTPATIENT)
Facility: HOSPITAL | Age: 44
Discharge: HOME | End: 2024-10-23
Attending: EMERGENCY MEDICINE
Payer: MEDICARE

## 2024-10-21 DIAGNOSIS — D57.00 SICKLE CELL PAIN CRISIS: ICD-10-CM

## 2024-10-21 DIAGNOSIS — R10.9 ABDOMINAL PAIN, UNSPECIFIED ABDOMINAL LOCATION: ICD-10-CM

## 2024-10-21 DIAGNOSIS — R11.2 NAUSEA AND VOMITING, UNSPECIFIED VOMITING TYPE: Primary | ICD-10-CM

## 2024-10-21 DIAGNOSIS — R07.9 CHEST PAIN: ICD-10-CM

## 2024-10-21 LAB
ABO + RH BLD: NORMAL
ALBUMIN SERPL BCP-MCNC: 4 G/DL (ref 3.5–5.2)
ALLENS TEST: NORMAL
ALP SERPL-CCNC: 85 U/L (ref 40–150)
ALT SERPL W/O P-5'-P-CCNC: 19 U/L (ref 10–44)
ANION GAP SERPL CALC-SCNC: 14 MMOL/L (ref 8–16)
AST SERPL-CCNC: 27 U/L (ref 10–40)
B-HCG UR QL: NEGATIVE
BASOPHILS # BLD AUTO: 0.04 K/UL (ref 0–0.2)
BASOPHILS NFR BLD: 0.5 % (ref 0–1.9)
BILIRUB SERPL-MCNC: 0.7 MG/DL (ref 0.1–1)
BILIRUB UR QL STRIP: NEGATIVE
BLD GP AB SCN CELLS X3 SERPL QL: NORMAL
BUN SERPL-MCNC: 7 MG/DL (ref 6–20)
BUN SERPL-MCNC: 7 MG/DL (ref 6–30)
CALCIUM SERPL-MCNC: 10.4 MG/DL (ref 8.7–10.5)
CHLORIDE SERPL-SCNC: 106 MMOL/L (ref 95–110)
CHLORIDE SERPL-SCNC: 106 MMOL/L (ref 95–110)
CLARITY UR REFRACT.AUTO: CLEAR
CO2 SERPL-SCNC: 19 MMOL/L (ref 23–29)
COLOR UR AUTO: YELLOW
CREAT SERPL-MCNC: 1 MG/DL (ref 0.5–1.4)
CREAT SERPL-MCNC: 1 MG/DL (ref 0.5–1.4)
DIFFERENTIAL METHOD BLD: ABNORMAL
EOSINOPHIL # BLD AUTO: 0 K/UL (ref 0–0.5)
EOSINOPHIL NFR BLD: 0.3 % (ref 0–8)
ERYTHROCYTE [DISTWIDTH] IN BLOOD BY AUTOMATED COUNT: 19.8 % (ref 11.5–14.5)
EST. GFR  (NO RACE VARIABLE): >60 ML/MIN/1.73 M^2
GLUCOSE SERPL-MCNC: 125 MG/DL (ref 70–110)
GLUCOSE SERPL-MCNC: 128 MG/DL (ref 70–110)
GLUCOSE UR QL STRIP: NEGATIVE
HCT VFR BLD AUTO: 28.3 % (ref 37–48.5)
HCT VFR BLD CALC: 32 %PCV (ref 36–54)
HGB BLD-MCNC: 9.6 G/DL (ref 12–16)
HGB UR QL STRIP: NEGATIVE
IMM GRANULOCYTES # BLD AUTO: 0.02 K/UL (ref 0–0.04)
IMM GRANULOCYTES NFR BLD AUTO: 0.3 % (ref 0–0.5)
INFLUENZA A, MOLECULAR: NEGATIVE
INFLUENZA B, MOLECULAR: NEGATIVE
KETONES UR QL STRIP: NEGATIVE
LDH SERPL L TO P-CCNC: 1.96 MMOL/L (ref 0.5–2.2)
LEUKOCYTE ESTERASE UR QL STRIP: NEGATIVE
LIPASE SERPL-CCNC: 35 U/L (ref 4–60)
LYMPHOCYTES # BLD AUTO: 2 K/UL (ref 1–4.8)
LYMPHOCYTES NFR BLD: 27.1 % (ref 18–48)
MCH RBC QN AUTO: 23.4 PG (ref 27–31)
MCHC RBC AUTO-ENTMCNC: 33.9 G/DL (ref 32–36)
MCV RBC AUTO: 69 FL (ref 82–98)
MONOCYTES # BLD AUTO: 0.5 K/UL (ref 0.3–1)
MONOCYTES NFR BLD: 7.4 % (ref 4–15)
NEUTROPHILS # BLD AUTO: 4.7 K/UL (ref 1.8–7.7)
NEUTROPHILS NFR BLD: 64.4 % (ref 38–73)
NITRITE UR QL STRIP: NEGATIVE
NRBC BLD-RTO: 1 /100 WBC
OHS QRS DURATION: 96 MS
OHS QTC CALCULATION: 443 MS
PH UR STRIP: 7 [PH] (ref 5–8)
PLATELET # BLD AUTO: 469 K/UL (ref 150–450)
PMV BLD AUTO: 9.7 FL (ref 9.2–12.9)
POC IONIZED CALCIUM: 1.09 MMOL/L (ref 1.06–1.42)
POC TCO2 (MEASURED): 23 MMOL/L (ref 23–29)
POTASSIUM BLD-SCNC: 3.2 MMOL/L (ref 3.5–5.1)
POTASSIUM SERPL-SCNC: 3.3 MMOL/L (ref 3.5–5.1)
PROT SERPL-MCNC: 8.8 G/DL (ref 6–8.4)
PROT UR QL STRIP: ABNORMAL
RBC # BLD AUTO: 4.1 M/UL (ref 4–5.4)
RETICS/RBC NFR AUTO: 1.4 % (ref 0.5–2.5)
SAMPLE: ABNORMAL
SAMPLE: NORMAL
SARS-COV-2 RDRP RESP QL NAA+PROBE: NEGATIVE
SITE: NORMAL
SODIUM BLD-SCNC: 140 MMOL/L (ref 136–145)
SODIUM SERPL-SCNC: 139 MMOL/L (ref 136–145)
SP GR UR STRIP: >=1.03 (ref 1–1.03)
SPECIMEN OUTDATE: NORMAL
SPECIMEN SOURCE: NORMAL
URN SPEC COLLECT METH UR: ABNORMAL
WBC # BLD AUTO: 7.28 K/UL (ref 3.9–12.7)

## 2024-10-21 PROCEDURE — 86901 BLOOD TYPING SEROLOGIC RH(D): CPT

## 2024-10-21 PROCEDURE — 83605 ASSAY OF LACTIC ACID: CPT

## 2024-10-21 PROCEDURE — 80053 COMPREHEN METABOLIC PANEL: CPT

## 2024-10-21 PROCEDURE — 25000003 PHARM REV CODE 250

## 2024-10-21 PROCEDURE — 85045 AUTOMATED RETICULOCYTE COUNT: CPT

## 2024-10-21 PROCEDURE — 96361 HYDRATE IV INFUSION ADD-ON: CPT

## 2024-10-21 PROCEDURE — 93010 ELECTROCARDIOGRAM REPORT: CPT | Mod: ,,, | Performed by: INTERNAL MEDICINE

## 2024-10-21 PROCEDURE — 63600175 PHARM REV CODE 636 W HCPCS

## 2024-10-21 PROCEDURE — 96376 TX/PRO/DX INJ SAME DRUG ADON: CPT

## 2024-10-21 PROCEDURE — 85025 COMPLETE CBC W/AUTO DIFF WBC: CPT

## 2024-10-21 PROCEDURE — 87502 INFLUENZA DNA AMP PROBE: CPT

## 2024-10-21 PROCEDURE — 93005 ELECTROCARDIOGRAM TRACING: CPT

## 2024-10-21 PROCEDURE — 63600175 PHARM REV CODE 636 W HCPCS: Performed by: EMERGENCY MEDICINE

## 2024-10-21 PROCEDURE — G0378 HOSPITAL OBSERVATION PER HR: HCPCS

## 2024-10-21 PROCEDURE — 94761 N-INVAS EAR/PLS OXIMETRY MLT: CPT

## 2024-10-21 PROCEDURE — 96375 TX/PRO/DX INJ NEW DRUG ADDON: CPT

## 2024-10-21 PROCEDURE — 87040 BLOOD CULTURE FOR BACTERIA: CPT | Mod: 59

## 2024-10-21 PROCEDURE — 81025 URINE PREGNANCY TEST: CPT

## 2024-10-21 PROCEDURE — 82330 ASSAY OF CALCIUM: CPT | Mod: 91

## 2024-10-21 PROCEDURE — 87154 CUL TYP ID BLD PTHGN 6+ TRGT: CPT

## 2024-10-21 PROCEDURE — 99285 EMERGENCY DEPT VISIT HI MDM: CPT | Mod: 25

## 2024-10-21 PROCEDURE — 80047 BASIC METABLC PNL IONIZED CA: CPT | Mod: XB

## 2024-10-21 PROCEDURE — 87635 SARS-COV-2 COVID-19 AMP PRB: CPT

## 2024-10-21 PROCEDURE — 83690 ASSAY OF LIPASE: CPT

## 2024-10-21 PROCEDURE — 99900035 HC TECH TIME PER 15 MIN (STAT)

## 2024-10-21 PROCEDURE — 86900 BLOOD TYPING SEROLOGIC ABO: CPT

## 2024-10-21 PROCEDURE — 81003 URINALYSIS AUTO W/O SCOPE: CPT

## 2024-10-21 PROCEDURE — 96374 THER/PROPH/DIAG INJ IV PUSH: CPT | Mod: 59

## 2024-10-21 PROCEDURE — 96372 THER/PROPH/DIAG INJ SC/IM: CPT

## 2024-10-21 PROCEDURE — 25500020 PHARM REV CODE 255: Performed by: EMERGENCY MEDICINE

## 2024-10-21 PROCEDURE — 86850 RBC ANTIBODY SCREEN: CPT

## 2024-10-21 RX ORDER — AMLODIPINE BESYLATE 10 MG/1
10 TABLET ORAL DAILY
Status: DISCONTINUED | OUTPATIENT
Start: 2024-10-21 | End: 2024-10-23 | Stop reason: HOSPADM

## 2024-10-21 RX ORDER — FLUOXETINE HYDROCHLORIDE 20 MG/1
40 CAPSULE ORAL DAILY
Status: DISCONTINUED | OUTPATIENT
Start: 2024-10-21 | End: 2024-10-23 | Stop reason: HOSPADM

## 2024-10-21 RX ORDER — SIMETHICONE 80 MG
1 TABLET,CHEWABLE ORAL 4 TIMES DAILY PRN
Status: DISCONTINUED | OUTPATIENT
Start: 2024-10-21 | End: 2024-10-23 | Stop reason: HOSPADM

## 2024-10-21 RX ORDER — ENOXAPARIN SODIUM 100 MG/ML
60 INJECTION SUBCUTANEOUS EVERY 12 HOURS
Status: DISCONTINUED | OUTPATIENT
Start: 2024-10-21 | End: 2024-10-23 | Stop reason: HOSPADM

## 2024-10-21 RX ORDER — DIPHENHYDRAMINE HYDROCHLORIDE 50 MG/ML
12.5 INJECTION INTRAMUSCULAR; INTRAVENOUS EVERY 6 HOURS PRN
Status: DISCONTINUED | OUTPATIENT
Start: 2024-10-21 | End: 2024-10-22

## 2024-10-21 RX ORDER — ACETAMINOPHEN 325 MG/1
650 TABLET ORAL EVERY 4 HOURS PRN
Status: DISCONTINUED | OUTPATIENT
Start: 2024-10-21 | End: 2024-10-23 | Stop reason: HOSPADM

## 2024-10-21 RX ORDER — ONDANSETRON 8 MG/1
8 TABLET, ORALLY DISINTEGRATING ORAL EVERY 8 HOURS PRN
Status: DISCONTINUED | OUTPATIENT
Start: 2024-10-21 | End: 2024-10-23 | Stop reason: HOSPADM

## 2024-10-21 RX ORDER — POLYETHYLENE GLYCOL 3350 17 G/17G
17 POWDER, FOR SOLUTION ORAL DAILY
Status: DISCONTINUED | OUTPATIENT
Start: 2024-10-21 | End: 2024-10-23 | Stop reason: HOSPADM

## 2024-10-21 RX ORDER — ALPRAZOLAM 0.25 MG/1
0.25 TABLET ORAL 2 TIMES DAILY PRN
Status: DISCONTINUED | OUTPATIENT
Start: 2024-10-21 | End: 2024-10-23 | Stop reason: HOSPADM

## 2024-10-21 RX ORDER — HYDROMORPHONE HYDROCHLORIDE 1 MG/ML
2 INJECTION, SOLUTION INTRAMUSCULAR; INTRAVENOUS; SUBCUTANEOUS
Status: COMPLETED | OUTPATIENT
Start: 2024-10-21 | End: 2024-10-21

## 2024-10-21 RX ORDER — DIPHENHYDRAMINE HYDROCHLORIDE 50 MG/ML
12.5 INJECTION INTRAMUSCULAR; INTRAVENOUS
Status: COMPLETED | OUTPATIENT
Start: 2024-10-21 | End: 2024-10-21

## 2024-10-21 RX ORDER — GABAPENTIN 300 MG/1
300 CAPSULE ORAL 3 TIMES DAILY
Status: DISCONTINUED | OUTPATIENT
Start: 2024-10-21 | End: 2024-10-23 | Stop reason: HOSPADM

## 2024-10-21 RX ORDER — POTASSIUM CHLORIDE 750 MG/1
30 CAPSULE, EXTENDED RELEASE ORAL ONCE
Status: COMPLETED | OUTPATIENT
Start: 2024-10-21 | End: 2024-10-21

## 2024-10-21 RX ORDER — MORPHINE SULFATE 30 MG/1
30 TABLET, FILM COATED, EXTENDED RELEASE ORAL EVERY 12 HOURS
Status: DISCONTINUED | OUTPATIENT
Start: 2024-10-21 | End: 2024-10-23 | Stop reason: HOSPADM

## 2024-10-21 RX ORDER — DROPERIDOL 2.5 MG/ML
1.25 INJECTION, SOLUTION INTRAMUSCULAR; INTRAVENOUS
Status: COMPLETED | OUTPATIENT
Start: 2024-10-21 | End: 2024-10-21

## 2024-10-21 RX ORDER — ACETAMINOPHEN 500 MG
1000 TABLET ORAL EVERY 8 HOURS PRN
Status: DISCONTINUED | OUTPATIENT
Start: 2024-10-21 | End: 2024-10-23 | Stop reason: HOSPADM

## 2024-10-21 RX ORDER — IPRATROPIUM BROMIDE AND ALBUTEROL SULFATE 2.5; .5 MG/3ML; MG/3ML
3 SOLUTION RESPIRATORY (INHALATION) EVERY 4 HOURS PRN
Status: DISCONTINUED | OUTPATIENT
Start: 2024-10-21 | End: 2024-10-23 | Stop reason: HOSPADM

## 2024-10-21 RX ORDER — IBUPROFEN 200 MG
16 TABLET ORAL
Status: DISCONTINUED | OUTPATIENT
Start: 2024-10-21 | End: 2024-10-23 | Stop reason: HOSPADM

## 2024-10-21 RX ORDER — ALUMINUM HYDROXIDE, MAGNESIUM HYDROXIDE, AND SIMETHICONE 1200; 120; 1200 MG/30ML; MG/30ML; MG/30ML
30 SUSPENSION ORAL 4 TIMES DAILY PRN
Status: DISCONTINUED | OUTPATIENT
Start: 2024-10-21 | End: 2024-10-23 | Stop reason: HOSPADM

## 2024-10-21 RX ORDER — SODIUM CHLORIDE 0.9 % (FLUSH) 0.9 %
5 SYRINGE (ML) INJECTION
Status: DISCONTINUED | OUTPATIENT
Start: 2024-10-21 | End: 2024-10-23 | Stop reason: HOSPADM

## 2024-10-21 RX ORDER — DOCUSATE SODIUM 100 MG
400 CAPSULE ORAL ONCE
Status: COMPLETED | OUTPATIENT
Start: 2024-10-21 | End: 2024-10-21

## 2024-10-21 RX ORDER — IBUPROFEN 200 MG
24 TABLET ORAL
Status: DISCONTINUED | OUTPATIENT
Start: 2024-10-21 | End: 2024-10-23 | Stop reason: HOSPADM

## 2024-10-21 RX ORDER — OXYCODONE HYDROCHLORIDE 10 MG/1
20 TABLET ORAL EVERY 4 HOURS PRN
Status: DISCONTINUED | OUTPATIENT
Start: 2024-10-21 | End: 2024-10-23 | Stop reason: HOSPADM

## 2024-10-21 RX ORDER — PROCHLORPERAZINE EDISYLATE 5 MG/ML
5 INJECTION INTRAMUSCULAR; INTRAVENOUS EVERY 6 HOURS PRN
Status: DISCONTINUED | OUTPATIENT
Start: 2024-10-21 | End: 2024-10-23 | Stop reason: HOSPADM

## 2024-10-21 RX ORDER — CARVEDILOL 25 MG/1
25 TABLET ORAL 2 TIMES DAILY
Status: DISCONTINUED | OUTPATIENT
Start: 2024-10-21 | End: 2024-10-23 | Stop reason: HOSPADM

## 2024-10-21 RX ORDER — BISACODYL 10 MG/1
10 SUPPOSITORY RECTAL DAILY PRN
Status: DISCONTINUED | OUTPATIENT
Start: 2024-10-21 | End: 2024-10-23 | Stop reason: HOSPADM

## 2024-10-21 RX ORDER — GLUCAGON 1 MG
1 KIT INJECTION
Status: DISCONTINUED | OUTPATIENT
Start: 2024-10-21 | End: 2024-10-23 | Stop reason: HOSPADM

## 2024-10-21 RX ORDER — FOLIC ACID 1 MG/1
1 TABLET ORAL DAILY
Status: DISCONTINUED | OUTPATIENT
Start: 2024-10-21 | End: 2024-10-23 | Stop reason: HOSPADM

## 2024-10-21 RX ORDER — HYDROMORPHONE HYDROCHLORIDE 1 MG/ML
2 INJECTION, SOLUTION INTRAMUSCULAR; INTRAVENOUS; SUBCUTANEOUS EVERY 4 HOURS PRN
Status: DISCONTINUED | OUTPATIENT
Start: 2024-10-21 | End: 2024-10-23 | Stop reason: HOSPADM

## 2024-10-21 RX ORDER — NALOXONE HCL 0.4 MG/ML
0.02 VIAL (ML) INJECTION
Status: DISCONTINUED | OUTPATIENT
Start: 2024-10-21 | End: 2024-10-23 | Stop reason: HOSPADM

## 2024-10-21 RX ORDER — TIZANIDINE 4 MG/1
4 TABLET ORAL EVERY 8 HOURS
Status: DISCONTINUED | OUTPATIENT
Start: 2024-10-21 | End: 2024-10-23 | Stop reason: HOSPADM

## 2024-10-21 RX ORDER — HYDROXYUREA 500 MG/1
1000 CAPSULE ORAL 2 TIMES DAILY
Status: DISCONTINUED | OUTPATIENT
Start: 2024-10-21 | End: 2024-10-22

## 2024-10-21 RX ORDER — ONDANSETRON HYDROCHLORIDE 2 MG/ML
4 INJECTION, SOLUTION INTRAVENOUS
Status: COMPLETED | OUTPATIENT
Start: 2024-10-21 | End: 2024-10-21

## 2024-10-21 RX ORDER — TALC
6 POWDER (GRAM) TOPICAL NIGHTLY PRN
Status: DISCONTINUED | OUTPATIENT
Start: 2024-10-21 | End: 2024-10-23 | Stop reason: HOSPADM

## 2024-10-21 RX ORDER — CEFTRIAXONE 2 G/1
2 INJECTION, POWDER, FOR SOLUTION INTRAMUSCULAR; INTRAVENOUS
Status: COMPLETED | OUTPATIENT
Start: 2024-10-21 | End: 2024-10-21

## 2024-10-21 RX ADMIN — ALPRAZOLAM 0.25 MG: 0.25 TABLET ORAL at 11:10

## 2024-10-21 RX ADMIN — OXYCODONE HYDROCHLORIDE 20 MG: 10 TABLET ORAL at 12:10

## 2024-10-21 RX ADMIN — SODIUM CHLORIDE 500 ML: 9 INJECTION, SOLUTION INTRAVENOUS at 07:10

## 2024-10-21 RX ADMIN — DIPHENHYDRAMINE HYDROCHLORIDE 12.5 MG: 50 INJECTION, SOLUTION INTRAMUSCULAR; INTRAVENOUS at 07:10

## 2024-10-21 RX ADMIN — GABAPENTIN 300 MG: 300 CAPSULE ORAL at 08:10

## 2024-10-21 RX ADMIN — CARVEDILOL 25 MG: 25 TABLET, FILM COATED ORAL at 10:10

## 2024-10-21 RX ADMIN — HYDROMORPHONE HYDROCHLORIDE 2 MG: 1 INJECTION, SOLUTION INTRAMUSCULAR; INTRAVENOUS; SUBCUTANEOUS at 06:10

## 2024-10-21 RX ADMIN — OXYCODONE HYDROCHLORIDE 20 MG: 10 TABLET ORAL at 09:10

## 2024-10-21 RX ADMIN — FLUOXETINE HYDROCHLORIDE 40 MG: 20 CAPSULE ORAL at 10:10

## 2024-10-21 RX ADMIN — HYDROXYUREA 1000 MG: 500 CAPSULE ORAL at 08:10

## 2024-10-21 RX ADMIN — HYDROMORPHONE HYDROCHLORIDE 2 MG: 1 INJECTION, SOLUTION INTRAMUSCULAR; INTRAVENOUS; SUBCUTANEOUS at 02:10

## 2024-10-21 RX ADMIN — TIZANIDINE 4 MG: 4 TABLET ORAL at 02:10

## 2024-10-21 RX ADMIN — HYDROMORPHONE HYDROCHLORIDE 2 MG: 1 INJECTION, SOLUTION INTRAMUSCULAR; INTRAVENOUS; SUBCUTANEOUS at 09:10

## 2024-10-21 RX ADMIN — DROPERIDOL 1.25 MG: 2.5 INJECTION, SOLUTION INTRAMUSCULAR; INTRAVENOUS at 08:10

## 2024-10-21 RX ADMIN — CEFTRIAXONE 2 G: 2 INJECTION, POWDER, FOR SOLUTION INTRAMUSCULAR; INTRAVENOUS at 05:10

## 2024-10-21 RX ADMIN — ENOXAPARIN SODIUM 60 MG: 60 INJECTION SUBCUTANEOUS at 11:10

## 2024-10-21 RX ADMIN — HYDROMORPHONE HYDROCHLORIDE 2 MG: 1 INJECTION, SOLUTION INTRAMUSCULAR; INTRAVENOUS; SUBCUTANEOUS at 08:10

## 2024-10-21 RX ADMIN — IOHEXOL 100 ML: 350 INJECTION, SOLUTION INTRAVENOUS at 06:10

## 2024-10-21 RX ADMIN — ONDANSETRON 4 MG: 2 INJECTION INTRAMUSCULAR; INTRAVENOUS at 06:10

## 2024-10-21 RX ADMIN — ENOXAPARIN SODIUM 60 MG: 60 INJECTION SUBCUTANEOUS at 08:10

## 2024-10-21 RX ADMIN — TIZANIDINE 4 MG: 4 TABLET ORAL at 08:10

## 2024-10-21 RX ADMIN — HYDROXYUREA 1000 MG: 500 CAPSULE ORAL at 10:10

## 2024-10-21 RX ADMIN — GABAPENTIN 300 MG: 300 CAPSULE ORAL at 10:10

## 2024-10-21 RX ADMIN — ALPRAZOLAM 0.25 MG: 0.25 TABLET ORAL at 09:10

## 2024-10-21 RX ADMIN — FOLIC ACID 1 MG: 1 TABLET ORAL at 10:10

## 2024-10-21 RX ADMIN — OXYCODONE HYDROCHLORIDE 20 MG: 10 TABLET ORAL at 05:10

## 2024-10-21 RX ADMIN — HYDROMORPHONE HYDROCHLORIDE 2 MG: 1 INJECTION, SOLUTION INTRAMUSCULAR; INTRAVENOUS; SUBCUTANEOUS at 05:10

## 2024-10-21 RX ADMIN — MORPHINE SULFATE 30 MG: 30 TABLET, FILM COATED, EXTENDED RELEASE ORAL at 10:10

## 2024-10-21 RX ADMIN — GABAPENTIN 300 MG: 300 CAPSULE ORAL at 02:10

## 2024-10-21 RX ADMIN — DROPERIDOL 1.25 MG: 2.5 INJECTION, SOLUTION INTRAMUSCULAR; INTRAVENOUS at 07:10

## 2024-10-21 RX ADMIN — POTASSIUM CHLORIDE 30 MEQ: 750 CAPSULE, EXTENDED RELEASE ORAL at 10:10

## 2024-10-21 RX ADMIN — Medication 400 ML: at 12:10

## 2024-10-21 RX ADMIN — AMLODIPINE BESYLATE 10 MG: 10 TABLET ORAL at 10:10

## 2024-10-21 RX ADMIN — CARVEDILOL 25 MG: 25 TABLET, FILM COATED ORAL at 08:10

## 2024-10-21 RX ADMIN — MORPHINE SULFATE 30 MG: 30 TABLET, FILM COATED, EXTENDED RELEASE ORAL at 09:10

## 2024-10-21 NOTE — ASSESSMENT & PLAN NOTE
Patients blood pressure range in the last 24 hours was: BP  Min: 154/86  Max: 156/75.The patient's inpatient anti-hypertensive regimen is listed below:  Current Antihypertensives  amLODIPine tablet 10 mg, Daily, Oral  carvediloL tablet 25 mg, 2 times daily, Oral    Plan  - BP is controlled, no changes needed to their regimen  - monitor and titrate as necessary

## 2024-10-21 NOTE — ASSESSMENT & PLAN NOTE
Patient presents with diffuse abdominal pain and body aches after onset of n/v/d 3 days ago.  Hgb baseline, retic 1.4.   - VSS  - pain control with scheduled MS Contin 30 mg BID, gabapentin 300 mg TID, home oxycodone 15 mg Q4h PRN, dilaudid 2 mg q4h breakthrough pain  - holding scheduled tylenol in setting of low grade fevers, monitor trend  - Pedialyte  - monitor with daily labs

## 2024-10-21 NOTE — PLAN OF CARE
[facility] - Emergency Dept  Initial Discharge Assessment       Primary Care Provider: Pee Montgomery MD    Admission Diagnosis: Nausea and vomiting, unspecified vomiting type [R11.2]    Admission Date: 10/21/2024  Expected Discharge Date:     Pt stated she is independent with her ambulation and ADL's and does not require assistance or equipment.    Pt to d/c home with no needs when ready    Transition of Care Barriers: (P) None    Payor: [employer] MANAGED MEDICARE / Plan: [employer] MEDICARE PLAN HMO / Product Type: Medicare Advantage /     Extended Emergency Contact Information  Primary Emergency Contact: Jack Malone  Mobile Phone: [phone]  Relation: Brother    Discharge Plan A: (P) Home  Discharge Plan B: (P) Home      [id number] [place], [place] - [address]  [address]  [place] [address]  Phone: [phone] Fax: [phone]    [facility]  [address] [facility] [address]  Phone: [phone] Fax: [phone]    [facility]  [address]  [facility] [address]  Phone: [phone] Fax: [phone]    [id number] [place] [place] - [address]  [address]  [place] [address]  Phone: [phone] Fax: [phone]    [id number] - [place], [place] - [address] AT [facility]  [address]  [place] [address]  Phone: [phone] Fax: [phone]    [id number] [place] [facility] [place] - [address]  [address]  [place] [address]  Phone: [phone] Fax: [phone]      Initial Assessment (most recent)       Adult Discharge Assessment - 10/21/24 0750          Discharge Assessment    Assessment Type Discharge Planning Assessment (P)      Confirmed/corrected address, phone number and insurance Yes (P)      Confirmed Demographics Correct on Facesheet (P)      Source of Information patient (P)      People in Home child(jayce), adult;parent(s);grandchild(jayce) (P)       Facility Arrived From: home (P)      Do you expect to return to your current living situation? Yes (P)      Do you have help at home or someone to help you manage your care at home? No (P)      Prior to hospitilization cognitive status: Alert/Oriented;No Deficits (P)      Current cognitive status: Alert/Oriented;No Deficits (P)      Walking or Climbing Stairs Difficulty no (P)      Dressing/Bathing Difficulty no (P)      Home Accessibility stairs to enter home (P)      Number of Stairs, Main Entrance two (P)      Home Layout Able to live on 1st floor (P)      Equipment Currently Used at Home none (P)      Patient currently being followed by outpatient case management? No (P)      Do you currently have service(s) that help you manage your care at home? No (P)      Do you have any problems affording any of your prescribed medications? No (P)      Is the patient taking medications as prescribed? yes (P)      Who is going to help you get home at discharge? family/friends (P)      How do you get to doctors appointments? car, drives self (P)      Are you on dialysis? No (P)      Do you take coumadin? No (P)      Discharge Plan A Home (P)      Discharge Plan B Home (P)      DME Needed Upon Discharge  none (P)      Discharge Plan discussed with: Patient (P)      Transition of Care Barriers None (P)         Physical Activity    On average, how many days per week do you engage in moderate to strenuous exercise (like a brisk walk)? 0 days (P)      On average, how many minutes do you engage in exercise at this level? 0 min (P)         Financial Resource Strain    How hard is it for you to pay for the very basics like food, housing, medical care, and heating? Somewhat hard (P)         Housing Stability    In the last 12 months, was there a time when you were not able to pay the mortgage or rent on time? Yes (P)      At any time in the past 12 months, were you homeless or living in a shelter (including now)? Yes (P)          Transportation Needs    Has the lack of transportation kept you from medical appointments, meetings, work or from getting things needed for daily living? No (P)         Food Insecurity    Within the past 12 months, you worried that your food would run out before you got the money to buy more. Never true (P)      Within the past 12 months, the food you bought just didn't last and you didn't have money to get more. Never true (P)         Stress    Do you feel stress - tense, restless, nervous, or anxious, or unable to sleep at night because your mind is troubled all the time - these days? To some extent (P)         Social Isolation    How often do you feel lonely or isolated from those around you?  Rarely (P)         Alcohol Use    Q1: How often do you have a drink containing alcohol? Never (P)      Q2: How many drinks containing alcohol do you have on a typical day when you are drinking? Patient does not drink (P)      Q3: How often do you have six or more drinks on one occasion? Never (P)         Utilities    In the past 12 months has the electric, gas, oil, or water company threatened to shut off services in your home? No (P)         Health Literacy    How often do you need to have someone help you when you read instructions, pamphlets, or other written material from your doctor or pharmacy? Rarely (P)         OTHER    Name(s) of People in Home adult daughter, granddaughter, mother (P)                    Mackenzie Wong CD, MSW, LMSW, RSW   Case Management  [facility]  Email: [email or web address]

## 2024-10-21 NOTE — ASSESSMENT & PLAN NOTE
Anemia is likely due to Iron deficiency. Most recent hemoglobin and hematocrit are listed below.  Recent Labs     10/21/24  0516 10/21/24  0544   HGB 9.6*  --    HCT 28.3* 32*     Plan  - Monitor serial CBC: Daily  - Transfuse PRBC if patient becomes hemodynamically unstable, symptomatic or H/H drops below 7/21.  - Patient has not received any PRBC transfusions to date  - Patient's anemia is currently stable

## 2024-10-21 NOTE — NURSING
Patient's blood pressure is 94/60. Patient c/o pain 8-10 requesting oxycodone. MD was made aware. MD is okay with giving oxycodone.

## 2024-10-21 NOTE — ED NOTES
Patient comes into the emergency department by private vehicle with complaints of nausea,diarrhea,body aches,fever,cough,SOB,chest pain since Friday night.    LOC: The patient is awake, alert and aware of environment with an appropriate affect, the patient is oriented x 3 and speaking appropriately.   APPEARANCE: Patient appears comfortable and in no acute distress, patient is clean and well groomed.  SKIN: The skin is warm and dry, color consistent with ethnicity, patient has normal skin turgor and moist mucus membranes, skin intact, no breakdown or bruising noted.   MUSCULOSKELETAL: Patient moving all extremities spontaneously, no swelling noted.  RESPIRATORY: Airway is open and patent, respirations are spontaneous, patient has a normal effort and rate, no accessory muscle.  CARDIAC: Pt placed on cardiac monitor. Patient has a normal rate and regular rhythm, no edema noted, capillary refill < 3 seconds.   : Pt denies any pain or frequency with urination.  NEURO: Pt opens eyes spontaneously, behavior appropriate to situation, follows commands, facial expression symmetrical, bilateral hand grasp equal and even, purposeful motor response noted, normal sensation in all extremities when touched with a finger.

## 2024-10-21 NOTE — ASSESSMENT & PLAN NOTE
Patient reports onset of n/v/d with associated abdominal pain on Friday after caring for who granddaughter who is sick with a GI bug / URI.  - hypertensive with a low grade fever of 100.7 upon arrival   - CTAP largely unremarkable - shows evidence of diarrheal illness and bladder wall thickening  - consider RUQ US pending clinical course  - obtain stool sample and UA  - CLD, advance as tolerated

## 2024-10-21 NOTE — PLAN OF CARE
Problem: Adult Inpatient Plan of Care  Goal: Plan of Care Review  Outcome: Progressing  Goal: Patient-Specific Goal (Individualized)  Outcome: Progressing  Goal: Absence of Hospital-Acquired Illness or Injury  Outcome: Progressing  Goal: Optimal Comfort and Wellbeing  Outcome: Progressing  Goal: Readiness for Transition of Care  Outcome: Progressing     Problem: Infection  Goal: Absence of Infection Signs and Symptoms  Outcome: Progressing     Problem: Acute Kidney Injury/Impairment  Goal: Fluid and Electrolyte Balance  Outcome: Progressing  Goal: Improved Oral Intake  Outcome: Progressing  Goal: Effective Renal Function  Outcome: Progressing     Problem: Pneumonia  Goal: Fluid Balance  Outcome: Progressing  Goal: Resolution of Infection Signs and Symptoms  Outcome: Progressing  Goal: Effective Oxygenation and Ventilation  Outcome: Progressing    AAOx4, pain medication provided, urine collected, no BM on my shift, bed in lowest position, locked, side rails up x 2, and call light within reach.

## 2024-10-21 NOTE — HPI
Nazanin Malone is a 44 y.o. 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 Patient reports onset of symptoms Friday after she was taking care of her sick granddaughter with viral gastroenteritis/URI. Symptoms started with diffuse abdominal pain, nausea, vomiting, fever. Symptoms progressed to whole-body pain and aches that is 10/10. Worst pain in paraspinals and right upper quadrant pain. She denies bilious or bloody vomiting, urinary symptoms, shortness of breath, and chest pain.    In the ED: low grade fever of 100.7 F and hypertensive up to 155/99, remainder of VSS. Breathing comfortably on RA. WBC 7.28. Hb 9.6. K 3.3. Reticulocytes 1.4. COVID/flu/RSV negative. EKG NSR, Qtc 443. CXR without acute findings. CTAP with IV contrast with evidence of nonspecific intraluminal fluid within loops of normal caliber colon, as may be seen the setting of diarrheal illness. Nonspecific circumferential urinary bladder wall thickening, for which correlation for signs/symptoms of cystitis would be recommended. And apparent soft tissue fullness in the region of the body of the stomach may represent coapted mucosa given nondistention, however correlation with EGD would be recommended to exclude soft tissue mass at this location. Blood cultures obtained. Given 2mg IV ceftraxone, 12.5 mg IV benadyl, 1.25 mg IV Droperidol x2 doses, 2mg IV dilaudid x3 doses, and IV zofran.

## 2024-10-21 NOTE — ED TRIAGE NOTES
Nazanin Malone, a 44 y.o. female presents to the ED w/ complaint of nausea,diarrhea,body aches ,fever,cough,SOB,chest pain since Friday night.Triage note:  Chief Complaint   Patient presents with    Emesis     Pt complaining of nausea/diarrhea x2 days. Pt states her grandson was diagnosed with a stomach virus. 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

## 2024-10-21 NOTE — NURSING
I assumed care of this patient at this time. Report received by FRANCIS Baer. Patient arrived to the unit via stretcher. The patient is AAOx4. The bed is in low position, locked, with two side rails up, and call light within reach.

## 2024-10-21 NOTE — ED PROVIDER NOTES
Encounter Date: 10/21/2024       History     Chief Complaint   Patient presents with    Emesis     Pt complaining of nausea/diarrhea x2 days. Pt states her grandson was diagnosed with a stomach virus. Pt also has Sickle Cell and is complaining of pain all over     Patient is a 44 year old female with a past medical history of sickle cell disease, strep pneumo sepsis, pneumonia, asthma acute chest syndrome that presents to the emergency department for abdominal pain.  Symptoms started Friday after she was taking care of her sick granddaughter with viral gastroenteritis/URI.  Symptoms started with diffuse abdominal pain, nausea, vomiting, fever.  Symptoms progressed to whole-body pain and aches that is 10/10.  Worst pain in paraspinals and right upper quadrant pain.  She denies bilious or bloody vomiting, urinary symptoms, shortness of breath.        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    Asthma     Depression     Hypertension     Morbid obesity     Opioid dependence 2017    Pneumonia due to Streptococcus pneumoniae 2017    Right lower lobe pneumonia 2017    Sepsis due to Streptococcus pneumoniae 2017    Sickle cell-beta thalassemia disease with pain     Trigeminal neuralgia      Past Surgical History:   Procedure Laterality Date     SECTION      TONSILLECTOMY      TUBAL LIGATION       Family History   Problem Relation Name Age of Onset    Heart disease Mother      Diabetes Mother      Heart disease Father      Breast cancer Neg Hx      Ovarian cancer Neg Hx      Colon cancer Neg Hx       Social History     Tobacco Use    Smoking status: Never    Smokeless tobacco:  Never   Substance Use Topics    Alcohol use: No    Drug use: Yes     Types: Marijuana     Comment: periodically      Review of Systems    Physical Exam     Initial Vitals [10/21/24 0303]   BP Pulse Resp Temp SpO2   (!) 155/99 101 18 (!) 100.7 °F (38.2 °C) 100 %      MAP       --         Physical Exam    Constitutional: She appears well-developed and well-nourished. She is not diaphoretic. She appears distressed (d/t pain).   HENT:   Head: Normocephalic and atraumatic. Mouth/Throat: Oropharynx is clear and moist.   Eyes: EOM are normal. Pupils are equal, round, and reactive to light.   Neck: Neck supple.   No midline spinal tenderness. TTP in paraspinals.   Normal range of motion.  Cardiovascular:  Normal rate, regular rhythm and intact distal pulses.           Pulmonary/Chest: Breath sounds normal. No respiratory distress. She has no wheezes. She has no rhonchi. She has no rales. She exhibits tenderness (with palpation).   Abdominal: Abdomen is soft. She exhibits no distension. There is abdominal tenderness (greatest in RUQ). There is no rebound and no guarding.   Musculoskeletal:      Cervical back: Normal range of motion and neck supple.     Neurological: She is alert.   Skin: Skin is warm and dry. Capillary refill takes less than 2 seconds.         ED Course   Procedures  Labs Reviewed   CBC W/ AUTO DIFFERENTIAL - Abnormal       Result Value    WBC 7.28      RBC 4.10      Hemoglobin 9.6 (*)     Hematocrit 28.3 (*)     MCV 69 (*)     MCH 23.4 (*)     MCHC 33.9      RDW 19.8 (*)     Platelets 469 (*)     MPV 9.7      Immature Granulocytes 0.3      Gran # (ANC) 4.7      Immature Grans (Abs) 0.02      Lymph # 2.0      Mono # 0.5      Eos # 0.0      Baso # 0.04      nRBC 1 (*)     Gran % 64.4      Lymph % 27.1      Mono % 7.4      Eosinophil % 0.3      Basophil % 0.5      Differential Method Automated     COMPREHENSIVE METABOLIC PANEL - Abnormal    Sodium 139      Potassium 3.3 (*)     Chloride 106      CO2 19 (*)      Glucose 125 (*)     BUN 7      Creatinine 1.0      Calcium 10.4      Total Protein 8.8 (*)     Albumin 4.0      Total Bilirubin 0.7      Alkaline Phosphatase 85      AST 27      ALT 19      eGFR >60.0      Anion Gap 14     ISTAT PROCEDURE - Abnormal    POC Glucose 128 (*)     POC BUN 7      POC Creatinine 1.0      POC Sodium 140      POC Potassium 3.2 (*)     POC Chloride 106      POC TCO2 (MEASURED) 23      POC Ionized Calcium 1.09      POC Hematocrit 32 (*)     Sample YG     INFLUENZA A & B BY MOLECULAR    Influenza A, Molecular Negative      Influenza B, Molecular Negative      Flu A & B Source NP     CULTURE, BLOOD   CULTURE, BLOOD   RETICULOCYTES    Retic 1.4     LIPASE    Lipase 35     SARS-COV-2 RNA AMPLIFICATION, QUAL    SARS-CoV-2 RNA, Amplification, Qual Negative     URINALYSIS, REFLEX TO URINE CULTURE   POCT URINE PREGNANCY   TYPE & SCREEN   ISTAT LACTATE    POC Lactate 1.96      Sample VENOUS      Site Other      Allens Test N/A     ISTAT CHEM8          Imaging Results              CT Abdomen Pelvis With IV Contrast NO Oral Contrast (In process)  Result time 10/21/24 06:29:41                     X-Ray Chest AP Portable (Final result)  Result time 10/21/24 05:56:47      Final result by Tarik Mata MD (10/21/24 05:56:47)                   Impression:      No significant change from prior study.      Electronically signed by: Tarik Mata MD  Date:    10/21/2024  Time:    05:56               Narrative:    EXAMINATION:  XR CHEST AP PORTABLE    CLINICAL HISTORY:  chest pain;    TECHNIQUE:  Single frontal view of the chest was performed.    COMPARISON:  10/08/2024.    FINDINGS:  Right-sided port catheter appears similar to prior.    Heart and lungs  appear unchanged when allowing for differences in technique and positioning.                                       Medications   ondansetron injection 4 mg (has no administration in time range)   HYDROmorphone injection 2 mg (has no administration in  time range)   HYDROmorphone injection 2 mg (2 mg Intravenous Given 10/21/24 0516)   cefTRIAXone injection 2 g (2 g Intravenous Given 10/21/24 0531)   HYDROmorphone injection 2 mg (2 mg Intravenous Given 10/21/24 0508)   iohexoL (OMNIPAQUE 350) injection 100 mL (100 mLs Intravenous Given 10/21/24 0610)     Medical Decision Making  Nazanin Malone is a 44 y.o. female with a past medical history of SCD presenting to the ED with abdominal pain. History and physical exam as above. Initial vital signs concerning for fever and tachycardia. Initial work-up to include: Labs (CBC, CMP, Lipase, Lactate, UA, Blood Cultures,), Imaging (CXR, CT A/P with IV contrast,), EKG,    Differential diagnosis considered for this patient includes, but is not limited to:  Sickle cell pain crisis, viral gastroenteritis, cholecystitis.  Other severe, more emergent diagnoses considered, but deemed much less likely, to include:  Acute chest.  Acute chest and much less likely as no respiratory distress, chest x-ray reassuring.    At this point the pt will be signed out to the oncoming physician team pending CT and labs.  Likely dispo will be admitted to the hospital.  Patient was notified and counseled about her incidental findings on CT.    Amount and/or Complexity of Data Reviewed  Labs: ordered. Decision-making details documented in ED Course.  Radiology: ordered. Decision-making details documented in ED Course.  ECG/medicine tests:  Decision-making details documented in ED Course.    Risk  Prescription drug management.              Attending Attestation:   Physician Attestation Statement for Resident:  As the supervising MD   Physician Attestation Statement: I have personally seen and examined this patient.   I agree with the above history.  -:   As the supervising MD I agree with the above PE.   -: Pt with most tenderness in the R paraspinals, moderate diffuse abdominal pain, mild diffuse spine pain to palpation.   As the supervising MD I  agree with the above treatment, course, plan, and disposition.   -: Pt nontoxic appearing, no respiratory distress.  Labs quite reassuring.  CT abdomen suggestive of enteritis.  No clear evidence of renal stone, cholelithiasis, or any acute surgical findings.  No spinal findings noted on CT.  Her back pain is more paraspinal and diffuse.  Considered epidural abscess or osteomyelitis given fever but seems less likely given she has diffuse body pain and the most unifying diagnosis seems to be enteritis (vomiting, diarrhea, abdominal pain) along with likely sickle cell pain crisis. No neuro findings on exam.  CXR not suggestive of acute chest syndrome.  Ceftriaxone given to cover for encapsulated organisms while blood cultures pending.  UA pending at time of signout.  Pt will be admitted for pain control and ongoing sepsis workup.                     ED Course as of 10/21/24 0631   Mon Oct 21, 2024   0455 EKG 12-lead  As per my independent interpretation of the EKG, normal rate of 82, normal intervals, normal axis, normal rhythm, and no STEMI    [HJ]   0554 Retic: 1.4 [HJ]   0555 Hemoglobin(!): 9.6  Above baseline from previous labs 3 weeks ago [HJ]   0601 WBC: 7.28 [HJ]   0604 X-Ray Chest AP Portable  As per my independent interpretation,No acute processes seen [HJ]   0605 POC Lactate: 1.96 [HJ]   0605 Lipase: 35  Much less likely pancreatitis [HJ]   0605 Comprehensive Metabolic Panel(!)  CMP largely unremarkable [HJ]      ED Course User Index  [HJ] Onur Palomares MD                           Clinical Impression:  Final diagnoses:  [R07.9] Chest pain  [R11.2] Nausea and vomiting, unspecified vomiting type (Primary)  [R10.9] Abdominal pain, unspecified abdominal location                 Onur Palomares MD  Resident  10/21/24 5260       Uvaldo Bañuelos MD  10/23/24 1143

## 2024-10-21 NOTE — H&P
[facility]  History & Physical    Patient Name: Nazanin Malone  MRN: [id number]  Patient Class: OP- Observation  Admission Date: 10/21/2024  Attending Physician: Ricardo Bentley DO   Primary Care Provider: Pee Montgomery MD         Patient information was obtained from patient, past medical records, and ER records.     Subjective:     Principal Problem:<principal problem not specified>    Chief Complaint:   Chief Complaint   Patient presents with    Emesis     Pt complaining of nausea/diarrhea x2 days. Pt states her grandson was diagnosed with a stomach virus. Pt also has Sickle Cell and is complaining of pain all over        HPI: Nazanin Malone is a 44 y.o. 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 Patient reports onset of symptoms Friday after she was taking care of her sick granddaughter with viral gastroenteritis/URI. Symptoms started with diffuse abdominal pain, nausea, vomiting, fever. Symptoms progressed to whole-body pain and aches that is 10/10. Worst pain in paraspinals and right upper quadrant pain. She denies bilious or bloody vomiting, urinary symptoms, shortness of breath, and chest pain.    In the ED: low grade fever of 100.7 F and hypertensive up to 155/99, remainder of VSS. Breathing comfortably on RA. WBC 7.28. Hb 9.6. K 3.3. Reticulocytes 1.4. COVID/flu/RSV negative. EKG NSR, Qtc 443. CXR without acute findings. CTAP with IV contrast with evidence of nonspecific intraluminal fluid within loops of normal caliber colon, as may be seen the setting of diarrheal illness. Nonspecific circumferential urinary bladder wall thickening, for which correlation for signs/symptoms of cystitis would be recommended. And apparent soft tissue fullness in the region of the body of the stomach may represent coapted mucosa  given nondistention, however correlation with EGD would be recommended to exclude soft tissue mass at this location. Blood cultures obtained. Given 2mg IV ceftraxone, 12.5 mg IV benadyl, 1.25 mg IV Droperidol x2 doses, 2mg IV dilaudid x3 doses, and IV zofran.     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    Asthma     Depression     Hypertension     Morbid obesity     Opioid dependence 2017    Pneumonia due to Streptococcus pneumoniae 2017    Right lower lobe pneumonia 2017    Sepsis due to Streptococcus pneumoniae 2017    Sickle cell-beta thalassemia disease with pain     Trigeminal neuralgia        Past Surgical History:   Procedure Laterality Date     SECTION      TONSILLECTOMY      TUBAL LIGATION         Review of patient's allergies indicates:   Allergen Reactions    Cat dander Anaphylaxis, Itching and Shortness Of Breath    Nsaids (non-steroidal anti-inflammatory drug) Itching and Anaphylaxis    Latex Rash       No current facility-administered medications on file prior to encounter.     Current Outpatient Medications on File Prior to Encounter   Medication Sig    acetaminophen (TYLENOL) 500 MG tablet Take 1,000 mg by mouth every 8 (eight) hours as needed for Pain.    albuterol (VENTOLIN HFA) 90 mcg/actuation inhaler Inhale 2 puffs into the lungs every 6 (six) hours as needed for Wheezing or Shortness of Breath. Rescue    ALPRAZolam (XANAX) 0.25 MG tablet Take 1 tablet (0.25 mg total) by mouth 2 (two) times daily as needed for Anxiety.    amLODIPine (NORVASC) 10 MG tablet Take 1 tablet (10 mg total) by mouth once daily.    ascorbic acid (VITAMIN C ORAL) Take 1 capsule by mouth once daily.    carvediloL (COREG) 25 MG tablet Take 1 tablet (25 mg total) by mouth 2 (two) times daily.    enoxaparin (LOVENOX) 60 mg/0.6 mL Syrg Inject 0.6 mLs (60 mg total) into the skin every 12  (twelve) hours.    FLUoxetine 40 MG capsule Take 1 capsule (40 mg total) by mouth once daily.    fluticasone propionate (FLONASE) 50 mcg/actuation nasal spray INSTILL 1 SPRAY INTO EACH NOSTRIL EVERY DAY    folic acid (FOLVITE) 1 MG tablet Take 1 tablet (1 mg total) by mouth once daily.    gabapentin (NEURONTIN) 300 MG capsule Take 1 capsule (300 mg total) by mouth 3 (three) times daily.    [] morphine (MS CONTIN) 30 MG 12 hr tablet Take 1 tablet (30 mg total) by mouth every 12 (twelve) hours. for 7 days    [] oxyCODONE (ROXICODONE) 15 MG Tab Take 1 tablet (15 mg total) by mouth every 4 (four) hours as needed for Pain.    promethazine (PHENERGAN) 25 MG tablet Take 1 tablet (25 mg total) by mouth every 6 (six) hours as needed for Nausea.    senna-docusate 8.6-50 mg (PERICOLACE) 8.6-50 mg per tablet Take 1 tablet by mouth daily as needed for Constipation.    tiZANidine (ZANAFLEX) 4 MG tablet Take 1 tablet (4 mg total) by mouth every 8 (eight) hours as needed.     Family History       Problem Relation (Age of Onset)    Diabetes Mother    Heart disease Mother, Father          Tobacco Use    Smoking status: Never    Smokeless tobacco: Never   Substance and Sexual Activity    Alcohol use: No    Drug use: Yes     Types: Marijuana     Comment: periodically     Sexual activity: Not Currently     Birth control/protection: See Surgical Hx     Review of Systems   Constitutional:  Positive for activity change, appetite change and fever. Negative for chills.   HENT:  Negative for trouble swallowing.    Eyes:  Negative for photophobia and visual disturbance.   Respiratory:  Negative for cough, chest tightness and shortness of breath.    Cardiovascular:  Negative for chest pain, palpitations and leg swelling.   Gastrointestinal:  Positive for abdominal pain, diarrhea, nausea and vomiting. Negative for constipation.   Genitourinary:  Negative for dysuria, frequency and hematuria.   Musculoskeletal:  Negative for back  pain, gait problem and neck pain.   Skin:  Negative for rash and wound.   Neurological:  Negative for dizziness, syncope, speech difficulty and light-headedness.   Psychiatric/Behavioral:  Negative for agitation and confusion. The patient is not nervous/anxious.      Objective:     Vital Signs (Most Recent):  Temp: 99.9 °F (37.7 °C) (10/21/24 0645)  Pulse: 85 (10/21/24 0645)  Resp: 20 (10/21/24 0943)  BP: (!) 154/86 (10/21/24 0645)  SpO2: 98 % (10/21/24 0645) Vital Signs (24h Range):  Temp:  [99.9 °F (37.7 °C)-100.7 °F (38.2 °C)] 99.9 °F (37.7 °C)  Pulse:  [] 85  Resp:  [14-20] 20  SpO2:  [98 %-100 %] 98 %  BP: (154-155)/(86-99) 154/86     Weight: 94.3 kg (208 lb)  Body mass index is 38.04 kg/m².     Physical Exam  Constitutional:       Appearance: She is obese.   HENT:      Head: Normocephalic.      Right Ear: External ear normal.      Left Ear: External ear normal.      Nose: Nose normal.      Mouth/Throat:      Mouth: Mucous membranes are moist.   Eyes:      Extraocular Movements: Extraocular movements intact.      Conjunctiva/sclera: Conjunctivae normal.   Cardiovascular:      Rate and Rhythm: Normal rate and regular rhythm.      Pulses: Normal pulses.   Pulmonary:      Effort: Pulmonary effort is normal.      Breath sounds: Normal breath sounds.   Abdominal:      General: Abdomen is flat.      Tenderness: There is abdominal tenderness (diffuse, generalized).   Musculoskeletal:      Right lower leg: No edema.      Left lower leg: No edema.   Skin:     General: Skin is warm and dry.   Neurological:      General: No focal deficit present.                Significant Labs: All pertinent labs within the past 24 hours have been reviewed.  CBC:   Recent Labs   Lab 10/21/24  0516 10/21/24  0544   WBC 7.28  --    HGB 9.6*  --    HCT 28.3* 32*   *  --      CMP:   Recent Labs   Lab 10/21/24  0516      K 3.3*      CO2 19*   *   BUN 7   CREATININE 1.0   CALCIUM 10.4   PROT 8.8*   ALBUMIN 4.0    BILITOT 0.7   ALKPHOS 85   AST 27   ALT 19   ANIONGAP 14       Significant Imaging: I have reviewed all pertinent imaging results/findings within the past 24 hours.  Assessment/Plan:     Sickle cell pain crisis  Patient presents with diffuse abdominal pain and body aches after onset of n/v/d 3 days ago.  Hgb baseline, retic 1.4.   - VSS  - pain control with scheduled MS Contin 30 mg BID, gabapentin 300 mg TID, home oxycodone 15 mg Q4h PRN, dilaudid 2 mg q4h breakthrough pain  - holding scheduled tylenol in setting of low grade fevers, monitor trend  - Pedialyte  - monitor with daily labs    Nausea and vomiting  Patient reports onset of n/v/d with associated abdominal pain on Friday after caring for who granddaughter who is sick with a GI bug / URI.  - hypertensive with a low grade fever of 100.7 upon arrival   - CTAP largely unremarkable - shows evidence of diarrheal illness and bladder wall thickening  - consider RUQ US pending clinical course  - obtain stool sample and UA  - CLD, advance as tolerated       Thrombosis of internal carotid, left  - continue lovenox 60 mg BID      Hypokalemia  Patient's most recent potassium results are listed below.   Recent Labs     10/21/24  0516   K 3.3*     Plan  - Replete potassium per protocol  - Monitor potassium Daily  - Patient's hypokalemia is stable    Folate deficiency  - continue folate      Anxiety  - continue fluoxetine and prn xanax      Trigeminal neuralgia  - continue gabapentin      Hypertension  Patients blood pressure range in the last 24 hours was: BP  Min: 154/86  Max: 156/75.The patient's inpatient anti-hypertensive regimen is listed below:  Current Antihypertensives  amLODIPine tablet 10 mg, Daily, Oral  carvediloL tablet 25 mg, 2 times daily, Oral    Plan  - BP is controlled, no changes needed to their regimen  - monitor and titrate as necessary     Iron deficiency anemia  Anemia is likely due to Iron deficiency. Most recent hemoglobin and hematocrit are  listed below.  Recent Labs     10/21/24  0516 10/21/24  0544   HGB 9.6*  --    HCT 28.3* 32*     Plan  - Monitor serial CBC: Daily  - Transfuse PRBC if patient becomes hemodynamically unstable, symptomatic or H/H drops below 7/21.  - Patient has not received any PRBC transfusions to date  - Patient's anemia is currently stable      VTE Risk Mitigation (From admission, onward)           Ordered     enoxaparin injection 60 mg  Every 12 hours         10/21/24 0948     IP VTE HIGH RISK PATIENT  Once         10/21/24 0905     Reason for No Pharmacological VTE Prophylaxis  Once        Question:  Reasons:  Answer:  Already adequately anticoagulated on oral Anticoagulants  Comment:  lovenox at home    10/21/24 0905                         On 10/21/2024, patient should be placed in hospital observation services under my care in collaboration with Dr. Ricardo Bentley.           Unique Hawthorne PA-C  Department of Hospital Medicine  [facility]

## 2024-10-21 NOTE — ED NOTES
I-STAT Chem-8+ Results:   Value Reference Range   Sodium 140 136-145 mmol/L   Potassium  3.2 3.5-5.1 mmol/L   Chloride 106  mmol/L   Ionized Calcium 1.09 1.06-1.42 mmol/L   CO2 (measured) 23 23-29 mmol/L   Glucose 128  mg/dL   BUN 7 6-30 mg/dL   Creatinine 1.0 0.5-1.4 mg/dL   Hematocrit 32 36-54%

## 2024-10-21 NOTE — SUBJECTIVE & OBJECTIVE
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    Asthma     Depression     Hypertension     Morbid obesity     Opioid dependence 2017    Pneumonia due to Streptococcus pneumoniae 2017    Right lower lobe pneumonia 2017    Sepsis due to Streptococcus pneumoniae 2017    Sickle cell-beta thalassemia disease with pain     Trigeminal neuralgia        Past Surgical History:   Procedure Laterality Date     SECTION      TONSILLECTOMY      TUBAL LIGATION         Review of patient's allergies indicates:   Allergen Reactions    Cat dander Anaphylaxis, Itching and Shortness Of Breath    Nsaids (non-steroidal anti-inflammatory drug) Itching and Anaphylaxis    Latex Rash       No current facility-administered medications on file prior to encounter.     Current Outpatient Medications on File Prior to Encounter   Medication Sig    acetaminophen (TYLENOL) 500 MG tablet Take 1,000 mg by mouth every 8 (eight) hours as needed for Pain.    albuterol (VENTOLIN HFA) 90 mcg/actuation inhaler Inhale 2 puffs into the lungs every 6 (six) hours as needed for Wheezing or Shortness of Breath. Rescue    ALPRAZolam (XANAX) 0.25 MG tablet Take 1 tablet (0.25 mg total) by mouth 2 (two) times daily as needed for Anxiety.    amLODIPine (NORVASC) 10 MG tablet Take 1 tablet (10 mg total) by mouth once daily.    ascorbic acid (VITAMIN C ORAL) Take 1 capsule by mouth once daily.    carvediloL (COREG) 25 MG tablet Take 1 tablet (25 mg total) by mouth 2 (two) times daily.    enoxaparin (LOVENOX) 60 mg/0.6 mL Syrg Inject 0.6 mLs (60 mg total) into the skin every 12 (twelve) hours.    FLUoxetine 40 MG capsule Take 1 capsule (40 mg total) by mouth once daily.    fluticasone propionate (FLONASE) 50 mcg/actuation nasal spray INSTILL 1 SPRAY INTO EACH NOSTRIL EVERY DAY    folic acid (FOLVITE) 1 MG tablet Take 1 tablet (1 mg total)  by mouth once daily.    gabapentin (NEURONTIN) 300 MG capsule Take 1 capsule (300 mg total) by mouth 3 (three) times daily.    [] morphine (MS CONTIN) 30 MG 12 hr tablet Take 1 tablet (30 mg total) by mouth every 12 (twelve) hours. for 7 days    [] oxyCODONE (ROXICODONE) 15 MG Tab Take 1 tablet (15 mg total) by mouth every 4 (four) hours as needed for Pain.    promethazine (PHENERGAN) 25 MG tablet Take 1 tablet (25 mg total) by mouth every 6 (six) hours as needed for Nausea.    senna-docusate 8.6-50 mg (PERICOLACE) 8.6-50 mg per tablet Take 1 tablet by mouth daily as needed for Constipation.    tiZANidine (ZANAFLEX) 4 MG tablet Take 1 tablet (4 mg total) by mouth every 8 (eight) hours as needed.     Family History       Problem Relation (Age of Onset)    Diabetes Mother    Heart disease Mother, Father          Tobacco Use    Smoking status: Never    Smokeless tobacco: Never   Substance and Sexual Activity    Alcohol use: No    Drug use: Yes     Types: Marijuana     Comment: periodically     Sexual activity: Not Currently     Birth control/protection: See Surgical Hx     Review of Systems   Constitutional:  Positive for activity change, appetite change and fever. Negative for chills.   HENT:  Negative for trouble swallowing.    Eyes:  Negative for photophobia and visual disturbance.   Respiratory:  Negative for cough, chest tightness and shortness of breath.    Cardiovascular:  Negative for chest pain, palpitations and leg swelling.   Gastrointestinal:  Positive for abdominal pain, diarrhea, nausea and vomiting. Negative for constipation.   Genitourinary:  Negative for dysuria, frequency and hematuria.   Musculoskeletal:  Negative for back pain, gait problem and neck pain.   Skin:  Negative for rash and wound.   Neurological:  Negative for dizziness, syncope, speech difficulty and light-headedness.   Psychiatric/Behavioral:  Negative for agitation and confusion. The patient is not nervous/anxious.       Objective:     Vital Signs (Most Recent):  Temp: 99.9 °F (37.7 °C) (10/21/24 0645)  Pulse: 85 (10/21/24 0645)  Resp: 20 (10/21/24 0943)  BP: (!) 154/86 (10/21/24 0645)  SpO2: 98 % (10/21/24 0645) Vital Signs (24h Range):  Temp:  [99.9 °F (37.7 °C)-100.7 °F (38.2 °C)] 99.9 °F (37.7 °C)  Pulse:  [] 85  Resp:  [14-20] 20  SpO2:  [98 %-100 %] 98 %  BP: (154-155)/(86-99) 154/86     Weight: 94.3 kg (208 lb)  Body mass index is 38.04 kg/m².     Physical Exam  Constitutional:       Appearance: She is obese.   HENT:      Head: Normocephalic.      Right Ear: External ear normal.      Left Ear: External ear normal.      Nose: Nose normal.      Mouth/Throat:      Mouth: Mucous membranes are moist.   Eyes:      Extraocular Movements: Extraocular movements intact.      Conjunctiva/sclera: Conjunctivae normal.   Cardiovascular:      Rate and Rhythm: Normal rate and regular rhythm.      Pulses: Normal pulses.   Pulmonary:      Effort: Pulmonary effort is normal.      Breath sounds: Normal breath sounds.   Abdominal:      General: Abdomen is flat.      Tenderness: There is abdominal tenderness (diffuse, generalized).   Musculoskeletal:      Right lower leg: No edema.      Left lower leg: No edema.   Skin:     General: Skin is warm and dry.   Neurological:      General: No focal deficit present.                Significant Labs: All pertinent labs within the past 24 hours have been reviewed.  CBC:   Recent Labs   Lab 10/21/24  0516 10/21/24  0544   WBC 7.28  --    HGB 9.6*  --    HCT 28.3* 32*   *  --      CMP:   Recent Labs   Lab 10/21/24  0516      K 3.3*      CO2 19*   *   BUN 7   CREATININE 1.0   CALCIUM 10.4   PROT 8.8*   ALBUMIN 4.0   BILITOT 0.7   ALKPHOS 85   AST 27   ALT 19   ANIONGAP 14       Significant Imaging: I have reviewed all pertinent imaging results/findings within the past 24 hours.

## 2024-10-21 NOTE — ASSESSMENT & PLAN NOTE
Patient's most recent potassium results are listed below.   Recent Labs     10/21/24  0516   K 3.3*     Plan  - Replete potassium per protocol  - Monitor potassium Daily  - Patient's hypokalemia is stable

## 2024-10-22 PROBLEM — N17.9 AKI (ACUTE KIDNEY INJURY): Status: RESOLVED | Noted: 2024-07-02 | Resolved: 2024-10-22

## 2024-10-22 PROBLEM — E66.812 OBESITY, CLASS II, BMI 35-39.9: Status: ACTIVE | Noted: 2017-04-25

## 2024-10-22 PROBLEM — Z76.5 DRUG-SEEKING BEHAVIOR: Status: ACTIVE | Noted: 2024-10-22

## 2024-10-22 PROBLEM — R11.2 NAUSEA AND VOMITING: Status: RESOLVED | Noted: 2024-08-22 | Resolved: 2024-10-22

## 2024-10-22 LAB
ACINETOBACTER CALCOACETICUS/BAUMANNII COMPLEX: NOT DETECTED
ALBUMIN SERPL BCP-MCNC: 3.5 G/DL (ref 3.5–5.2)
ALP SERPL-CCNC: 70 U/L (ref 40–150)
ALT SERPL W/O P-5'-P-CCNC: 17 U/L (ref 10–44)
ANION GAP SERPL CALC-SCNC: 7 MMOL/L (ref 8–16)
ANION GAP SERPL CALC-SCNC: 9 MMOL/L (ref 8–16)
AST SERPL-CCNC: 24 U/L (ref 10–40)
BACTEROIDES FRAGILIS: NOT DETECTED
BASOPHILS # BLD AUTO: 0.06 K/UL (ref 0–0.2)
BASOPHILS NFR BLD: 0.7 % (ref 0–1.9)
BILIRUB SERPL-MCNC: 0.4 MG/DL (ref 0.1–1)
BUN SERPL-MCNC: 13 MG/DL (ref 6–20)
BUN SERPL-MCNC: 13 MG/DL (ref 6–20)
CALCIUM SERPL-MCNC: 9.1 MG/DL (ref 8.7–10.5)
CALCIUM SERPL-MCNC: 9.4 MG/DL (ref 8.7–10.5)
CANDIDA ALBICANS: NOT DETECTED
CANDIDA AURIS: NOT DETECTED
CANDIDA GLABRATA: NOT DETECTED
CANDIDA KRUSEI: NOT DETECTED
CANDIDA PARAPSILOSIS: NOT DETECTED
CANDIDA TROPICALIS: NOT DETECTED
CHLORIDE SERPL-SCNC: 106 MMOL/L (ref 95–110)
CHLORIDE SERPL-SCNC: 108 MMOL/L (ref 95–110)
CO2 SERPL-SCNC: 23 MMOL/L (ref 23–29)
CO2 SERPL-SCNC: 24 MMOL/L (ref 23–29)
CREAT SERPL-MCNC: 1.6 MG/DL (ref 0.5–1.4)
CREAT SERPL-MCNC: 1.6 MG/DL (ref 0.5–1.4)
CRYPTOCOCCUS NEOFORMANS/GATTII: NOT DETECTED
CTX-M GENE (ESBL PRODUCER): ABNORMAL
DIFFERENTIAL METHOD BLD: ABNORMAL
ENTEROBACTER CLOACAE COMPLEX: NOT DETECTED
ENTEROBACTERALES: NOT DETECTED
ENTEROCOCCUS FAECALIS: NOT DETECTED
ENTEROCOCCUS FAECIUM: NOT DETECTED
EOSINOPHIL # BLD AUTO: 0.2 K/UL (ref 0–0.5)
EOSINOPHIL NFR BLD: 2.7 % (ref 0–8)
ERYTHROCYTE [DISTWIDTH] IN BLOOD BY AUTOMATED COUNT: 19.1 % (ref 11.5–14.5)
ESCHERICHIA COLI: NOT DETECTED
EST. GFR  (NO RACE VARIABLE): 40 ML/MIN/1.73 M^2
EST. GFR  (NO RACE VARIABLE): 40 ML/MIN/1.73 M^2
GLUCOSE SERPL-MCNC: 84 MG/DL (ref 70–110)
GLUCOSE SERPL-MCNC: 88 MG/DL (ref 70–110)
HAEMOPHILUS INFLUENZAE: NOT DETECTED
HCT VFR BLD AUTO: 24.5 % (ref 37–48.5)
HGB BLD-MCNC: 8.2 G/DL (ref 12–16)
IMM GRANULOCYTES # BLD AUTO: 0.03 K/UL (ref 0–0.04)
IMM GRANULOCYTES NFR BLD AUTO: 0.3 % (ref 0–0.5)
IMP GENE (CARBAPENEM RESISTANT): ABNORMAL
KLEBSIELLA AEROGENES: NOT DETECTED
KLEBSIELLA OXYTOCA: NOT DETECTED
KLEBSIELLA PNEUMONIAE GROUP: NOT DETECTED
KPC RESISTANCE GENE (CARBAPENEM): ABNORMAL
LISTERIA MONOCYTOGENES: NOT DETECTED
LYMPHOCYTES # BLD AUTO: 3.2 K/UL (ref 1–4.8)
LYMPHOCYTES NFR BLD: 36.2 % (ref 18–48)
MCH RBC QN AUTO: 23.8 PG (ref 27–31)
MCHC RBC AUTO-ENTMCNC: 33.5 G/DL (ref 32–36)
MCR-1: ABNORMAL
MCV RBC AUTO: 71 FL (ref 82–98)
MEC A/C AND MREJ (MRSA): ABNORMAL
MEC A/C: DETECTED
MONOCYTES # BLD AUTO: 1 K/UL (ref 0.3–1)
MONOCYTES NFR BLD: 11.6 % (ref 4–15)
NDM GENE (CARBAPENEM RESISTANT): ABNORMAL
NEISSERIA MENINGITIDIS: NOT DETECTED
NEUTROPHILS # BLD AUTO: 4.3 K/UL (ref 1.8–7.7)
NEUTROPHILS NFR BLD: 48.5 % (ref 38–73)
NRBC BLD-RTO: 1 /100 WBC
OXA-48-LIKE (CARBAPENEM RESISTANT): ABNORMAL
PLATELET # BLD AUTO: 361 K/UL (ref 150–450)
PMV BLD AUTO: 9.3 FL (ref 9.2–12.9)
POTASSIUM SERPL-SCNC: 3.4 MMOL/L (ref 3.5–5.1)
POTASSIUM SERPL-SCNC: 3.5 MMOL/L (ref 3.5–5.1)
PROT SERPL-MCNC: 7.3 G/DL (ref 6–8.4)
PROTEUS SPECIES: NOT DETECTED
PSEUDOMONAS AERUGINOSA: NOT DETECTED
RBC # BLD AUTO: 3.44 M/UL (ref 4–5.4)
SALMONELLA SP: NOT DETECTED
SERRATIA MARCESCENS: NOT DETECTED
SODIUM SERPL-SCNC: 138 MMOL/L (ref 136–145)
SODIUM SERPL-SCNC: 139 MMOL/L (ref 136–145)
STAPHYLOCOCCUS AUREUS: NOT DETECTED
STAPHYLOCOCCUS EPIDERMIDIS: DETECTED
STAPHYLOCOCCUS LUGDUNESIS: NOT DETECTED
STAPHYLOCOCCUS SPECIES: ABNORMAL
STENOTROPHOMONAS MALTOPHILIA: NOT DETECTED
STREPTOCOCCUS AGALACTIAE: NOT DETECTED
STREPTOCOCCUS PNEUMONIAE: NOT DETECTED
STREPTOCOCCUS PYOGENES: NOT DETECTED
STREPTOCOCCUS SPECIES: NOT DETECTED
VAN A/B (VRE GENE): ABNORMAL
VIM GENE (CARBAPENEM RESISTANT): ABNORMAL
WBC # BLD AUTO: 8.78 K/UL (ref 3.9–12.7)

## 2024-10-22 PROCEDURE — 80053 COMPREHEN METABOLIC PANEL: CPT

## 2024-10-22 PROCEDURE — 96361 HYDRATE IV INFUSION ADD-ON: CPT

## 2024-10-22 PROCEDURE — 96372 THER/PROPH/DIAG INJ SC/IM: CPT

## 2024-10-22 PROCEDURE — 25000003 PHARM REV CODE 250

## 2024-10-22 PROCEDURE — 63600175 PHARM REV CODE 636 W HCPCS

## 2024-10-22 PROCEDURE — 94761 N-INVAS EAR/PLS OXIMETRY MLT: CPT

## 2024-10-22 PROCEDURE — 85025 COMPLETE CBC W/AUTO DIFF WBC: CPT

## 2024-10-22 PROCEDURE — 36415 COLL VENOUS BLD VENIPUNCTURE: CPT

## 2024-10-22 PROCEDURE — 96376 TX/PRO/DX INJ SAME DRUG ADON: CPT

## 2024-10-22 PROCEDURE — 80048 BASIC METABOLIC PNL TOTAL CA: CPT | Mod: XB

## 2024-10-22 PROCEDURE — G0378 HOSPITAL OBSERVATION PER HR: HCPCS

## 2024-10-22 RX ORDER — HYDROXYUREA 500 MG/1
500 CAPSULE ORAL 2 TIMES DAILY
Status: DISCONTINUED | OUTPATIENT
Start: 2024-10-22 | End: 2024-10-22

## 2024-10-22 RX ORDER — DOCUSATE SODIUM 100 MG
400 CAPSULE ORAL
Status: DISCONTINUED | OUTPATIENT
Start: 2024-10-22 | End: 2024-10-23 | Stop reason: HOSPADM

## 2024-10-22 RX ADMIN — HYDROMORPHONE HYDROCHLORIDE 2 MG: 1 INJECTION, SOLUTION INTRAMUSCULAR; INTRAVENOUS; SUBCUTANEOUS at 08:10

## 2024-10-22 RX ADMIN — GABAPENTIN 300 MG: 300 CAPSULE ORAL at 08:10

## 2024-10-22 RX ADMIN — GABAPENTIN 300 MG: 300 CAPSULE ORAL at 02:10

## 2024-10-22 RX ADMIN — Medication 400 ML: at 04:10

## 2024-10-22 RX ADMIN — MORPHINE SULFATE 30 MG: 30 TABLET, FILM COATED, EXTENDED RELEASE ORAL at 08:10

## 2024-10-22 RX ADMIN — TIZANIDINE 4 MG: 4 TABLET ORAL at 01:10

## 2024-10-22 RX ADMIN — FLUOXETINE HYDROCHLORIDE 40 MG: 20 CAPSULE ORAL at 08:10

## 2024-10-22 RX ADMIN — FOLIC ACID 1 MG: 1 TABLET ORAL at 08:10

## 2024-10-22 RX ADMIN — TIZANIDINE 4 MG: 4 TABLET ORAL at 06:10

## 2024-10-22 RX ADMIN — HYDROMORPHONE HYDROCHLORIDE 2 MG: 1 INJECTION, SOLUTION INTRAMUSCULAR; INTRAVENOUS; SUBCUTANEOUS at 11:10

## 2024-10-22 RX ADMIN — ENOXAPARIN SODIUM 60 MG: 60 INJECTION SUBCUTANEOUS at 08:10

## 2024-10-22 RX ADMIN — Medication 400 ML: at 08:10

## 2024-10-22 RX ADMIN — SODIUM CHLORIDE 1000 ML: 9 INJECTION, SOLUTION INTRAVENOUS at 09:10

## 2024-10-22 RX ADMIN — HYDROMORPHONE HYDROCHLORIDE 2 MG: 1 INJECTION, SOLUTION INTRAMUSCULAR; INTRAVENOUS; SUBCUTANEOUS at 12:10

## 2024-10-22 RX ADMIN — HYDROMORPHONE HYDROCHLORIDE 2 MG: 1 INJECTION, SOLUTION INTRAMUSCULAR; INTRAVENOUS; SUBCUTANEOUS at 05:10

## 2024-10-22 RX ADMIN — OXYCODONE HYDROCHLORIDE 20 MG: 10 TABLET ORAL at 12:10

## 2024-10-22 RX ADMIN — OXYCODONE HYDROCHLORIDE 20 MG: 10 TABLET ORAL at 07:10

## 2024-10-22 RX ADMIN — TIZANIDINE 4 MG: 4 TABLET ORAL at 08:10

## 2024-10-22 RX ADMIN — HYDROMORPHONE HYDROCHLORIDE 2 MG: 1 INJECTION, SOLUTION INTRAMUSCULAR; INTRAVENOUS; SUBCUTANEOUS at 04:10

## 2024-10-22 RX ADMIN — OXYCODONE HYDROCHLORIDE 20 MG: 10 TABLET ORAL at 03:10

## 2024-10-22 NOTE — ASSESSMENT & PLAN NOTE
For some reason not following up with her hematologist who typically manages her sickle cell associated pain  Needs to establish with pain management or follow up with hematology

## 2024-10-22 NOTE — ASSESSMENT & PLAN NOTE
Likely secondary to volume depletion/hypotension  Encourage oral intake  Strict I's and Os  Monitor for recovery  IVF given 10.22.24

## 2024-10-22 NOTE — NURSING
Patient was requesting hydromorphone for pain. Checked blood pressure manually 88/54. MD was made aware.

## 2024-10-22 NOTE — HOSPITAL COURSE
"Admitted for enteritis v sickle cell pain crisis.  GI panel not able to be obtained as diarrhea resolved.  Pain seeking behavior.  She was given 7 days of pain meds last discharge 10.13.24 and presented the day after she ran out.  Complaining of "spine pain" osseous structures negative for fracture on CT abd and CXR.  No white count or fever to indicate we are missing epidural abscess.  1/2 blood cultures with resistant staph epi thought to be contaminant.  Did develop FLOR likely secondary to volume depletion and low blood pressures; this resolved.  Pain controlled and provided with prescriptions to get her through until she follows up with hematology.  Pt deemed appropriate for discharge; seen and examined prior to departure. Plan discussed with pt, who was agreeable and amenable; medications were discussed and reviewed, outpatient follow-up scheduled, ER precautions were given, all questions were answered to the pt's satisfaction, and was subsequently discharged.    "

## 2024-10-22 NOTE — SUBJECTIVE & OBJECTIVE
"Interval History:  Seen and evaluated on general medical floor  No acute events overnight    Clinical status stable, unchanged  Pain seeking  No new complaints  Having "severe pain"  HR normal, hypotensive    Objective:     Vital Signs (Most Recent):  Temp: 98.4 °F (36.9 °C) (10/22/24 1138)  Pulse: 67 (10/22/24 1138)  Resp: 18 (10/22/24 1217)  BP: 124/76 (10/22/24 1138)  SpO2: 97 % (10/22/24 1138) Vital Signs (24h Range):  Temp:  [97.5 °F (36.4 °C)-98.6 °F (37 °C)] 98.4 °F (36.9 °C)  Pulse:  [61-75] 67  Resp:  [16-19] 18  SpO2:  [97 %-100 %] 97 %  BP: ()/(54-76) 124/76     Weight: 98.3 kg (216 lb 11.4 oz)  Body mass index is 39.64 kg/m².    Intake/Output Summary (Last 24 hours) at 10/22/2024 1452  Last data filed at 10/21/2024 1841  Gross per 24 hour   Intake 240 ml   Output 250 ml   Net -10 ml         Physical Exam  Constitutional:       Appearance: She is obese.   HENT:      Head: Normocephalic.      Right Ear: External ear normal.      Left Ear: External ear normal.      Nose: Nose normal.      Mouth/Throat:      Mouth: Mucous membranes are moist.   Eyes:      Extraocular Movements: Extraocular movements intact.      Conjunctiva/sclera: Conjunctivae normal.   Cardiovascular:      Rate and Rhythm: Normal rate and regular rhythm.      Pulses: Normal pulses.   Pulmonary:      Effort: Pulmonary effort is normal.      Breath sounds: Normal breath sounds.   Abdominal:      General: Abdomen is flat.      Tenderness: There is abdominal tenderness (diffuse, generalized).   Musculoskeletal:      Right lower leg: No edema.      Left lower leg: No edema.   Skin:     General: Skin is warm and dry.   Neurological:      General: No focal deficit present.             Significant Labs: All pertinent labs within the past 24 hours have been reviewed.    Significant Imaging: I have reviewed all pertinent imaging results/findings within the past 24 hours.  "

## 2024-10-22 NOTE — PROGRESS NOTES
[facility] Medicine  Progress Note    Patient Name: Nazanin Malone  MRN: [id number]  Patient Class: OP- Observation   Admission Date: 10/21/2024  Length of Stay: 0 days  Attending Physician: Ricardo Bentley DO  Primary Care Provider: Pee Montgomery MD        Subjective:     Principal Problem:Sickle cell pain crisis        HPI:  Nazanin Malone is a 44 y.o. 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 Patient reports onset of symptoms Friday after she was taking care of her sick granddaughter with viral gastroenteritis/URI. Symptoms started with diffuse abdominal pain, nausea, vomiting, fever. Symptoms progressed to whole-body pain and aches that is 10/10. Worst pain in paraspinals and right upper quadrant pain. She denies bilious or bloody vomiting, urinary symptoms, shortness of breath, and chest pain.    In the ED: low grade fever of 100.7 F and hypertensive up to 155/99, remainder of VSS. Breathing comfortably on RA. WBC 7.28. Hb 9.6. K 3.3. Reticulocytes 1.4. COVID/flu/RSV negative. EKG NSR, Qtc 443. CXR without acute findings. CTAP with IV contrast with evidence of nonspecific intraluminal fluid within loops of normal caliber colon, as may be seen the setting of diarrheal illness. Nonspecific circumferential urinary bladder wall thickening, for which correlation for signs/symptoms of cystitis would be recommended. And apparent soft tissue fullness in the region of the body of the stomach may represent coapted mucosa given nondistention, however correlation with EGD would be recommended to exclude soft tissue mass at this location. Blood cultures obtained. Given 2mg IV ceftraxone, 12.5 mg IV benadyl, 1.25 mg IV Droperidol x2 doses, 2mg IV dilaudid x3 doses, and IV zofran.     Overview/Hospital Course:  Admitted for enteritis v sickle  "cell pain crisis.  GI panel not able to be obtained as diarrhea resolved.  Pain seeking behavior.  She was given 7 days of pain meds last discharge 10.13.24 and presented the day after she ran out.  Complaining of "spine pain" osseous structures negative for fracture on CT abd and CXR.  No white count or fever to indicate we are missing epidural abscess.  1/2 blood cultures with resistant staph epi thought to be contaminant.  Did develop FLOR likely secondary to volume depletion and low blood pressures.  Fluid resuscitating with plans for discharge in 10.23.24.    Interval History:  Seen and evaluated on general medical floor  No acute events overnight    Clinical status stable, unchanged  Pain seeking  No new complaints  Having "severe pain"  HR normal, hypotensive    Objective:     Vital Signs (Most Recent):  Temp: 98.4 °F (36.9 °C) (10/22/24 1138)  Pulse: 67 (10/22/24 1138)  Resp: 18 (10/22/24 1217)  BP: 124/76 (10/22/24 1138)  SpO2: 97 % (10/22/24 1138) Vital Signs (24h Range):  Temp:  [97.5 °F (36.4 °C)-98.6 °F (37 °C)] 98.4 °F (36.9 °C)  Pulse:  [61-75] 67  Resp:  [16-19] 18  SpO2:  [97 %-100 %] 97 %  BP: ()/(54-76) 124/76     Weight: 98.3 kg (216 lb 11.4 oz)  Body mass index is 39.64 kg/m².    Intake/Output Summary (Last 24 hours) at 10/22/2024 1452  Last data filed at 10/21/2024 1841  Gross per 24 hour   Intake 240 ml   Output 250 ml   Net -10 ml         Physical Exam  Constitutional:       Appearance: She is obese.   HENT:      Head: Normocephalic.      Right Ear: External ear normal.      Left Ear: External ear normal.      Nose: Nose normal.      Mouth/Throat:      Mouth: Mucous membranes are moist.   Eyes:      Extraocular Movements: Extraocular movements intact.      Conjunctiva/sclera: Conjunctivae normal.   Cardiovascular:      Rate and Rhythm: Normal rate and regular rhythm.      Pulses: Normal pulses.   Pulmonary:      Effort: Pulmonary effort is normal.      Breath sounds: Normal breath " sounds.   Abdominal:      General: Abdomen is flat.      Tenderness: There is abdominal tenderness (diffuse, generalized).   Musculoskeletal:      Right lower leg: No edema.      Left lower leg: No edema.   Skin:     General: Skin is warm and dry.   Neurological:      General: No focal deficit present.             Significant Labs: All pertinent labs within the past 24 hours have been reviewed.    Significant Imaging: I have reviewed all pertinent imaging results/findings within the past 24 hours.    Assessment/Plan:      * Sickle cell pain crisis  Patient presents with diffuse abdominal pain and body aches after onset of n/v/d 3 days ago.  Hgb baseline, retic 1.4.   - VSS  - pain control with scheduled MS Contin 30 mg BID, gabapentin 300 mg TID, home oxycodone 15 mg Q4h PRN, dilaudid 2 mg q4h breakthrough pain  - holding scheduled tylenol in setting of low grade fevers, monitor trend  - Pedialyte  - monitor with daily labs    Drug-seeking behavior  For some reason not following up with her hematologist who typically manages her sickle cell associated pain  Needs to establish with pain management or follow up with hematology    Thrombosis of internal carotid, left  - continue lovenox 60 mg BID      FLOR (acute kidney injury)  Likely secondary to volume depletion/hypotension  Encourage oral intake  Strict I's and Os  Monitor for recovery  IVF given 10.22.24    Hypokalemia  Patient's most recent potassium results are listed below.   Recent Labs     10/21/24  0516   K 3.3*     Plan  - Replete potassium per protocol  - Monitor potassium Daily  - Patient's hypokalemia is stable    Folate deficiency  - continue folate      Anxiety  - continue fluoxetine and prn xanax      Trigeminal neuralgia  - continue gabapentin      Obesity, Class II, BMI 35-39.9  Body mass index is 39.64 kg/m².  Would benefit from dietary and lifestyle modifications in relation to health  Consider dietary/nutrition consult  outpatient    Hypertension  Patients blood pressure range in the last 24 hours was: BP  Min: 154/86  Max: 156/75.The patient's inpatient anti-hypertensive regimen is listed below:  Current Antihypertensives  amLODIPine tablet 10 mg, Daily, Oral  carvediloL tablet 25 mg, 2 times daily, Oral    Plan  - BP is controlled, no changes needed to their regimen  - monitor and titrate as necessary     Iron deficiency anemia  Anemia is likely due to Iron deficiency. Most recent hemoglobin and hematocrit are listed below.  Recent Labs     10/21/24  0516 10/21/24  0544   HGB 9.6*  --    HCT 28.3* 32*     Plan  - Monitor serial CBC: Daily  - Transfuse PRBC if patient becomes hemodynamically unstable, symptomatic or H/H drops below 7/21.  - Patient has not received any PRBC transfusions to date  - Patient's anemia is currently stable      VTE Risk Mitigation (From admission, onward)           Ordered     enoxaparin injection 60 mg  Every 12 hours         10/21/24 0948     IP VTE HIGH RISK PATIENT  Once         10/21/24 0905     Reason for No Pharmacological VTE Prophylaxis  Once        Question:  Reasons:  Answer:  Already adequately anticoagulated on oral Anticoagulants  Comment:  lovenox at home    10/21/24 0905                    Discharge Planning   ALYSE: 10/23/2024     Code Status: Full Code   Is the patient medically ready for discharge?:     Reason for patient still in hospital (select all that apply): Patient new problem and Patient trending condition  Discharge Plan A: Home                  Ricardo Bentley DO  Department of Hospital Medicine   [facility]

## 2024-10-22 NOTE — ASSESSMENT & PLAN NOTE
Body mass index is 39.64 kg/m².  Would benefit from dietary and lifestyle modifications in relation to health  Consider dietary/nutrition consult outpatient

## 2024-10-23 ENCOUNTER — TELEPHONE (OUTPATIENT)
Dept: GASTROENTEROLOGY | Facility: CLINIC | Age: 44
End: 2024-10-23
Payer: MEDICARE

## 2024-10-23 VITALS
RESPIRATION RATE: 17 BRPM | DIASTOLIC BLOOD PRESSURE: 68 MMHG | OXYGEN SATURATION: 99 % | TEMPERATURE: 99 F | HEART RATE: 78 BPM | BODY MASS INDEX: 39.88 KG/M2 | HEIGHT: 62 IN | SYSTOLIC BLOOD PRESSURE: 134 MMHG | WEIGHT: 216.69 LBS

## 2024-10-23 PROBLEM — N17.9 AKI (ACUTE KIDNEY INJURY): Status: RESOLVED | Noted: 2024-07-02 | Resolved: 2024-10-23

## 2024-10-23 PROBLEM — D57.00 SICKLE CELL PAIN CRISIS: Status: RESOLVED | Noted: 2020-12-21 | Resolved: 2024-10-23

## 2024-10-23 LAB
ALBUMIN SERPL BCP-MCNC: 3.4 G/DL (ref 3.5–5.2)
ALP SERPL-CCNC: 72 U/L (ref 40–150)
ALT SERPL W/O P-5'-P-CCNC: 25 U/L (ref 10–44)
ANION GAP SERPL CALC-SCNC: 6 MMOL/L (ref 8–16)
AST SERPL-CCNC: 34 U/L (ref 10–40)
BASOPHILS # BLD AUTO: 0.04 K/UL (ref 0–0.2)
BASOPHILS NFR BLD: 0.7 % (ref 0–1.9)
BILIRUB SERPL-MCNC: 0.3 MG/DL (ref 0.1–1)
BUN SERPL-MCNC: 13 MG/DL (ref 6–20)
CALCIUM SERPL-MCNC: 9.4 MG/DL (ref 8.7–10.5)
CHLORIDE SERPL-SCNC: 106 MMOL/L (ref 95–110)
CO2 SERPL-SCNC: 27 MMOL/L (ref 23–29)
CREAT SERPL-MCNC: 1.1 MG/DL (ref 0.5–1.4)
DIFFERENTIAL METHOD BLD: ABNORMAL
EOSINOPHIL # BLD AUTO: 0.3 K/UL (ref 0–0.5)
EOSINOPHIL NFR BLD: 5.8 % (ref 0–8)
ERYTHROCYTE [DISTWIDTH] IN BLOOD BY AUTOMATED COUNT: 19.2 % (ref 11.5–14.5)
EST. GFR  (NO RACE VARIABLE): >60 ML/MIN/1.73 M^2
GLUCOSE SERPL-MCNC: 83 MG/DL (ref 70–110)
HCT VFR BLD AUTO: 25 % (ref 37–48.5)
HGB BLD-MCNC: 8.4 G/DL (ref 12–16)
IMM GRANULOCYTES # BLD AUTO: 0.01 K/UL (ref 0–0.04)
IMM GRANULOCYTES NFR BLD AUTO: 0.2 % (ref 0–0.5)
LYMPHOCYTES # BLD AUTO: 3.2 K/UL (ref 1–4.8)
LYMPHOCYTES NFR BLD: 56.5 % (ref 18–48)
MCH RBC QN AUTO: 23.9 PG (ref 27–31)
MCHC RBC AUTO-ENTMCNC: 33.6 G/DL (ref 32–36)
MCV RBC AUTO: 71 FL (ref 82–98)
MONOCYTES # BLD AUTO: 0.8 K/UL (ref 0.3–1)
MONOCYTES NFR BLD: 13.1 % (ref 4–15)
NEUTROPHILS # BLD AUTO: 1.4 K/UL (ref 1.8–7.7)
NEUTROPHILS NFR BLD: 23.7 % (ref 38–73)
NRBC BLD-RTO: 1 /100 WBC
PLATELET # BLD AUTO: 372 K/UL (ref 150–450)
PMV BLD AUTO: 9.5 FL (ref 9.2–12.9)
POTASSIUM SERPL-SCNC: 4.2 MMOL/L (ref 3.5–5.1)
PROT SERPL-MCNC: 7.2 G/DL (ref 6–8.4)
RBC # BLD AUTO: 3.52 M/UL (ref 4–5.4)
SODIUM SERPL-SCNC: 139 MMOL/L (ref 136–145)
WBC # BLD AUTO: 5.73 K/UL (ref 3.9–12.7)

## 2024-10-23 PROCEDURE — G0378 HOSPITAL OBSERVATION PER HR: HCPCS

## 2024-10-23 PROCEDURE — 25000003 PHARM REV CODE 250

## 2024-10-23 PROCEDURE — 85025 COMPLETE CBC W/AUTO DIFF WBC: CPT

## 2024-10-23 PROCEDURE — 63600175 PHARM REV CODE 636 W HCPCS

## 2024-10-23 PROCEDURE — 96372 THER/PROPH/DIAG INJ SC/IM: CPT

## 2024-10-23 PROCEDURE — 96376 TX/PRO/DX INJ SAME DRUG ADON: CPT

## 2024-10-23 PROCEDURE — 94761 N-INVAS EAR/PLS OXIMETRY MLT: CPT

## 2024-10-23 PROCEDURE — 80053 COMPREHEN METABOLIC PANEL: CPT

## 2024-10-23 RX ORDER — MORPHINE SULFATE 30 MG/1
30 TABLET, FILM COATED, EXTENDED RELEASE ORAL EVERY 12 HOURS
Qty: 14 TABLET | Refills: 0 | Status: ON HOLD | OUTPATIENT
Start: 2024-10-23 | End: 2024-10-31

## 2024-10-23 RX ORDER — HEPARIN 100 UNIT/ML
5 SYRINGE INTRAVENOUS ONCE
Status: COMPLETED | OUTPATIENT
Start: 2024-10-23 | End: 2024-10-23

## 2024-10-23 RX ORDER — OXYCODONE HYDROCHLORIDE 15 MG/1
15 TABLET ORAL EVERY 4 HOURS PRN
Qty: 42 TABLET | Refills: 0 | Status: ON HOLD | OUTPATIENT
Start: 2024-10-23 | End: 2024-10-31

## 2024-10-23 RX ADMIN — FOLIC ACID 1 MG: 1 TABLET ORAL at 09:10

## 2024-10-23 RX ADMIN — Medication 400 ML: at 12:10

## 2024-10-23 RX ADMIN — ALPRAZOLAM 0.25 MG: 0.25 TABLET ORAL at 02:10

## 2024-10-23 RX ADMIN — HYDROMORPHONE HYDROCHLORIDE 2 MG: 1 INJECTION, SOLUTION INTRAMUSCULAR; INTRAVENOUS; SUBCUTANEOUS at 12:10

## 2024-10-23 RX ADMIN — HEPARIN 500 UNITS: 100 SYRINGE at 02:10

## 2024-10-23 RX ADMIN — OXYCODONE HYDROCHLORIDE 20 MG: 10 TABLET ORAL at 05:10

## 2024-10-23 RX ADMIN — Medication 400 ML: at 08:10

## 2024-10-23 RX ADMIN — ENOXAPARIN SODIUM 60 MG: 60 INJECTION SUBCUTANEOUS at 09:10

## 2024-10-23 RX ADMIN — OXYCODONE HYDROCHLORIDE 20 MG: 10 TABLET ORAL at 10:10

## 2024-10-23 RX ADMIN — TIZANIDINE 4 MG: 4 TABLET ORAL at 01:10

## 2024-10-23 RX ADMIN — HYDROMORPHONE HYDROCHLORIDE 2 MG: 1 INJECTION, SOLUTION INTRAMUSCULAR; INTRAVENOUS; SUBCUTANEOUS at 03:10

## 2024-10-23 RX ADMIN — AMLODIPINE BESYLATE 10 MG: 10 TABLET ORAL at 09:10

## 2024-10-23 RX ADMIN — HYDROMORPHONE HYDROCHLORIDE 2 MG: 1 INJECTION, SOLUTION INTRAMUSCULAR; INTRAVENOUS; SUBCUTANEOUS at 08:10

## 2024-10-23 RX ADMIN — GABAPENTIN 300 MG: 300 CAPSULE ORAL at 02:10

## 2024-10-23 RX ADMIN — GABAPENTIN 300 MG: 300 CAPSULE ORAL at 09:10

## 2024-10-23 RX ADMIN — MORPHINE SULFATE 30 MG: 30 TABLET, FILM COATED, EXTENDED RELEASE ORAL at 09:10

## 2024-10-23 RX ADMIN — TIZANIDINE 4 MG: 4 TABLET ORAL at 05:10

## 2024-10-23 RX ADMIN — Medication 400 ML: at 04:10

## 2024-10-23 RX ADMIN — FLUOXETINE HYDROCHLORIDE 40 MG: 20 CAPSULE ORAL at 09:10

## 2024-10-23 NOTE — PLAN OF CARE
DARRION scheduled to d/c today. SW reach out to CHW team via in basket regarding scheduling f/u appt with PCP, Gastroenterology  and to see if pt Hematology appt that's scheduled Nov 21, 2024 @ 8:40am can be sooner if possible.   2:38pm: DARRION sent a message to CHW team regarding scheduling f/u via in basket.     ADRIEN Rinaldi   [facility]    Case Management Dept  [phone]

## 2024-10-23 NOTE — TELEPHONE ENCOUNTER
----- Message from Yola Stevenson sent at 10/23/2024  2:50 PM CDT -----  Regarding: Appt Access  Contact: [phone]  [facility] calling regarding Appointment Access (message) for #Scheduling Request     Appt Type: New Patient     Date/Time Preference: first available     Treating Provider: Any provider     Caller Name: [facility]     Contact Preference: [phone]    Comments/notes: Patient has a referral in system.

## 2024-10-23 NOTE — PLAN OF CARE
APPOINTMENT:    Necessary Specialty Appointments: Gastroenterology     Spoke with representative rayray from [facility] scheduling office, patient pending call back for appointment.

## 2024-10-23 NOTE — PLAN OF CARE
Problem: Adult Inpatient Plan of Care  Goal: Plan of Care Review  Outcome: Progressing  Goal: Patient-Specific Goal (Individualized)  Outcome: Progressing  Goal: Absence of Hospital-Acquired Illness or Injury  Outcome: Progressing  Goal: Optimal Comfort and Wellbeing  Outcome: Progressing  Goal: Readiness for Transition of Care  Outcome: Progressing     Problem: Infection  Goal: Absence of Infection Signs and Symptoms  Outcome: Progressing     Problem: Acute Kidney Injury/Impairment  Goal: Fluid and Electrolyte Balance  Outcome: Progressing  Goal: Improved Oral Intake  Outcome: Progressing  Goal: Effective Renal Function  Outcome: Progressing     Problem: Pneumonia  Goal: Fluid Balance  Outcome: Progressing  Goal: Resolution of Infection Signs and Symptoms  Outcome: Progressing  Goal: Effective Oxygenation and Ventilation  Outcome: Progressing    AAOX4, pain medication administered, IV bolus given, bed in lowest position, locked, side rails up x 2 and call light within reach.

## 2024-10-23 NOTE — NURSING
Pt just received pain medicine at 0344, is now requesting more pain medication before 4 hours. When entering the room, patient is asleep and drowsy.

## 2024-10-23 NOTE — NURSING
Patient received discharge instructions and verbalized understanding. Port flushed with heparin and deaccessed. Medications delivered to bedside. Patient in room waiting for wheelchair transport.

## 2024-10-24 LAB
BACTERIA BLD CULT: ABNORMAL

## 2024-10-24 NOTE — DISCHARGE SUMMARY
[facility] Medicine  Discharge Summary      Patient Name: Nazanin Malone  MRN: [id number]  VLADIMIR: [id number]  Patient Class: OP- Observation  Admission Date: 10/21/2024  Hospital Length of Stay: 0 days  Discharge Date and Time: 10/23/2024  3:11 PM  Attending Physician: Kezia att. providers found   Discharging Provider: Ricardo Bentley DO  Primary Care Provider: Pee Montgomery MD  [facility] Medicine Team: [facility] Ricardo Bentley DO  Primary Care Team: [facility]    HPI:   Nazanin Malone is a 44 y.o. 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 Patient reports onset of symptoms Friday after she was taking care of her sick granddaughter with viral gastroenteritis/URI. Symptoms started with diffuse abdominal pain, nausea, vomiting, fever. Symptoms progressed to whole-body pain and aches that is 10/10. Worst pain in paraspinals and right upper quadrant pain. She denies bilious or bloody vomiting, urinary symptoms, shortness of breath, and chest pain.    In the ED: low grade fever of 100.7 F and hypertensive up to 155/99, remainder of VSS. Breathing comfortably on RA. WBC 7.28. Hb 9.6. K 3.3. Reticulocytes 1.4. COVID/flu/RSV negative. EKG NSR, Qtc 443. CXR without acute findings. CTAP with IV contrast with evidence of nonspecific intraluminal fluid within loops of normal caliber colon, as may be seen the setting of diarrheal illness. Nonspecific circumferential urinary bladder wall thickening, for which correlation for signs/symptoms of cystitis would be recommended. And apparent soft tissue fullness in the region of the body of the stomach may represent coapted mucosa given nondistention, however correlation with EGD would be recommended to exclude soft tissue mass at this location. Blood cultures obtained. Given 2mg IV ceftraxone, 12.5  "mg IV benadyl, 1.25 mg IV Droperidol x2 doses, 2mg IV dilaudid x3 doses, and IV zofran.     * No surgery found *      Hospital Course:   Admitted for enteritis v sickle cell pain crisis.  GI panel not able to be obtained as diarrhea resolved.  Pain seeking behavior.  She was given 7 days of pain meds last discharge 10.13.24 and presented the day after she ran out.  Complaining of "spine pain" osseous structures negative for fracture on CT abd and CXR.  No white count or fever to indicate we are missing epidural abscess.  1/2 blood cultures with resistant staph epi thought to be contaminant.  Did develop FLOR likely secondary to volume depletion and low blood pressures; this resolved.  Pain controlled and provided with prescriptions to get her through until she follows up with hematology.  Pt deemed appropriate for discharge; seen and examined prior to departure. Plan discussed with pt, who was agreeable and amenable; medications were discussed and reviewed, outpatient follow-up scheduled, ER precautions were given, all questions were answered to the pt's satisfaction, and was subsequently discharged.       Goals of Care Treatment Preferences:  Code Status: Full Code      SDOH Screening:  The patient was screened for food insecurity, housing instability, transportation needs, utility difficulties, and interpersonal safety. The social determinant(s) of health identified as a concern this admission are:  Housing instability    The plan to address these concerns addressed    Social Drivers of Health with Concerns     Housing Stability: High Risk (10/21/2024)        Consults:     No new Assessment & Plan notes have been filed under this hospital service since the last note was generated.  Service: Hospital Medicine    Final Active Diagnoses:    Diagnosis Date Noted POA    Drug-seeking behavior [Z76.5] 10/22/2024 Yes    Thrombosis of internal carotid, left [I65.22] 08/03/2024 Yes    Folate deficiency [E53.8] 01/10/2020 Yes "    Hypokalemia [E87.6] 01/10/2020 Yes    Trigeminal neuralgia [G50.0] 03/20/2018 Yes    Anxiety [F41.9] 03/20/2018 Yes    Obesity, Class II, BMI 35-39.9 [E66.812] 04/25/2017 Yes    Hypertension [I10] 04/16/2017 Yes    Iron deficiency anemia [D50.9] 02/21/2017 Yes      Problems Resolved During this Admission:    Diagnosis Date Noted Date Resolved POA    PRINCIPAL PROBLEM:  Sickle cell pain crisis [D57.00] 12/21/2020 10/23/2024 Yes    Nausea and vomiting [R11.2] 08/22/2024 10/22/2024 Yes    FLOR (acute kidney injury) [N17.9] 07/02/2024 10/23/2024 Yes       Discharged Condition: good    Disposition: Home or Self Care    Follow Up:   Follow-up Information       Pee Montgomery MD Follow up.    Specialty: Hematology and Oncology  Contact information:  [address]  [facility] [address]  [phone]               Pee Montgomery MD .    Specialty: Hematology and Oncology  Contact information:  [address] [facility] [address]  [phone]                           Patient Instructions:      Ambulatory referral/consult to Gastroenterology   Standing Status: Future   Referral Priority: Routine Referral Type: Consultation   Referral Reason: Specialty Services Required   Requested Specialty: Gastroenterology   Number of Visits Requested: 1     Diet Adult Regular     Notify your health care provider if you experience any of the following:  severe uncontrolled pain     Activity as tolerated       Significant Diagnostic Studies: N/A    Pending Diagnostic Studies:       None           Medications:  Reconciled Home Medications:      Medication List        CONTINUE taking these medications      acetaminophen 500 MG tablet  Commonly known as: TYLENOL  Take 1,000 mg by mouth every 8 (eight) hours as needed for Pain.     albuterol 90 mcg/actuation inhaler  Commonly known as: VENTOLIN HFA  Inhale 2 puffs into the lungs every 6 (six) hours as needed for Wheezing or Shortness of Breath. Rescue     ALPRAZolam  0.25 MG tablet  Commonly known as: XANAX  Take 1 tablet (0.25 mg total) by mouth 2 (two) times daily as needed for Anxiety.     amLODIPine 10 MG tablet  Commonly known as: NORVASC  Take 1 tablet (10 mg total) by mouth once daily.     carvediloL 25 MG tablet  Commonly known as: COREG  Take 1 tablet (25 mg total) by mouth 2 (two) times daily.     enoxaparin 60 mg/0.6 mL Syrg  Commonly known as: LOVENOX  Inject 0.6 mLs (60 mg total) into the skin every 12 (twelve) hours.     FLUoxetine 40 MG capsule  Take 1 capsule (40 mg total) by mouth once daily.     fluticasone propionate 50 mcg/actuation nasal spray  Commonly known as: FLONASE  INSTILL 1 SPRAY INTO EACH NOSTRIL EVERY DAY     folic acid 1 MG tablet  Commonly known as: FOLVITE  Take 1 tablet (1 mg total) by mouth once daily.     gabapentin 300 MG capsule  Commonly known as: NEURONTIN  Take 1 capsule (300 mg total) by mouth 3 (three) times daily.     morphine 30 MG 12 hr tablet  Commonly known as: MS CONTIN  Take 1 tablet (30 mg total) by mouth every 12 (twelve) hours. for 7 days     oxyCODONE 15 MG Tab  Commonly known as: ROXICODONE  Take 1 tablet (15 mg total) by mouth every 4 (four) hours as needed for Pain.     promethazine 25 MG tablet  Commonly known as: PHENERGAN  Take 1 tablet (25 mg total) by mouth every 6 (six) hours as needed for Nausea.     senna-docusate 8.6-50 mg 8.6-50 mg per tablet  Commonly known as: PERICOLACE  Take 1 tablet by mouth daily as needed for Constipation.     tiZANidine 4 MG tablet  Commonly known as: ZANAFLEX  Take 1 tablet (4 mg total) by mouth every 8 (eight) hours as needed.     VITAMIN C ORAL  Take 1 capsule by mouth once daily.              Indwelling Lines/Drains at time of discharge:   Lines/Drains/Airways       None                   Time spent on the discharge of patient: >35 minutes         Ricardo Bentley DO  Department of Hospital Medicine  [facility]

## 2024-10-26 LAB — BACTERIA BLD CULT: NORMAL

## 2024-10-27 ENCOUNTER — HOSPITAL ENCOUNTER (INPATIENT)
Facility: HOSPITAL | Age: 44
LOS: 3 days | Discharge: HOME | DRG: 812 | End: 2024-10-31
Attending: EMERGENCY MEDICINE | Admitting: FAMILY MEDICINE
Payer: MEDICARE

## 2024-10-27 DIAGNOSIS — R94.31 QT PROLONGATION: ICD-10-CM

## 2024-10-27 DIAGNOSIS — D57.00 SICKLE CELL PAIN CRISIS: ICD-10-CM

## 2024-10-27 DIAGNOSIS — R10.9 ABDOMINAL PAIN, UNSPECIFIED ABDOMINAL LOCATION: Primary | ICD-10-CM

## 2024-10-27 DIAGNOSIS — F43.20 ADJUSTMENT DISORDER, UNSPECIFIED TYPE: ICD-10-CM

## 2024-10-27 DIAGNOSIS — R07.9 CHEST PAIN: ICD-10-CM

## 2024-10-27 DIAGNOSIS — R94.31 PROLONGED QT INTERVAL: ICD-10-CM

## 2024-10-27 PROCEDURE — 93005 ELECTROCARDIOGRAM TRACING: CPT

## 2024-10-27 PROCEDURE — 80053 COMPREHEN METABOLIC PANEL: CPT | Performed by: EMERGENCY MEDICINE

## 2024-10-27 PROCEDURE — 83735 ASSAY OF MAGNESIUM: CPT | Performed by: EMERGENCY MEDICINE

## 2024-10-27 PROCEDURE — 96375 TX/PRO/DX INJ NEW DRUG ADDON: CPT

## 2024-10-27 PROCEDURE — 99285 EMERGENCY DEPT VISIT HI MDM: CPT | Mod: 25

## 2024-10-27 PROCEDURE — 85025 COMPLETE CBC W/AUTO DIFF WBC: CPT | Performed by: EMERGENCY MEDICINE

## 2024-10-27 PROCEDURE — 93010 ELECTROCARDIOGRAM REPORT: CPT | Mod: ,,, | Performed by: INTERNAL MEDICINE

## 2024-10-27 PROCEDURE — 63600175 PHARM REV CODE 636 W HCPCS: Performed by: EMERGENCY MEDICINE

## 2024-10-27 PROCEDURE — 96365 THER/PROPH/DIAG IV INF INIT: CPT

## 2024-10-27 PROCEDURE — 83880 ASSAY OF NATRIURETIC PEPTIDE: CPT | Performed by: EMERGENCY MEDICINE

## 2024-10-27 PROCEDURE — 83690 ASSAY OF LIPASE: CPT | Performed by: EMERGENCY MEDICINE

## 2024-10-27 PROCEDURE — 25000003 PHARM REV CODE 250: Performed by: EMERGENCY MEDICINE

## 2024-10-27 PROCEDURE — 84484 ASSAY OF TROPONIN QUANT: CPT | Performed by: EMERGENCY MEDICINE

## 2024-10-27 RX ORDER — HYDROMORPHONE HYDROCHLORIDE 1 MG/ML
2 INJECTION, SOLUTION INTRAMUSCULAR; INTRAVENOUS; SUBCUTANEOUS
Status: COMPLETED | OUTPATIENT
Start: 2024-10-27 | End: 2024-10-27

## 2024-10-27 RX ORDER — MAGNESIUM SULFATE HEPTAHYDRATE 40 MG/ML
2 INJECTION, SOLUTION INTRAVENOUS ONCE
Status: COMPLETED | OUTPATIENT
Start: 2024-10-28 | End: 2024-10-28

## 2024-10-27 RX ADMIN — HYDROMORPHONE HYDROCHLORIDE 2 MG: 1 INJECTION, SOLUTION INTRAMUSCULAR; INTRAVENOUS; SUBCUTANEOUS at 11:10

## 2024-10-27 RX ADMIN — SODIUM CHLORIDE 1000 ML: 9 INJECTION, SOLUTION INTRAVENOUS at 11:10

## 2024-10-27 RX ADMIN — MAGNESIUM SULFATE IN WATER 2 G: 40 INJECTION, SOLUTION INTRAVENOUS at 11:10

## 2024-10-28 PROBLEM — R10.11 RIGHT UPPER QUADRANT ABDOMINAL PAIN: Status: ACTIVE | Noted: 2024-10-28

## 2024-10-28 PROBLEM — R10.9 ABDOMINAL PAIN: Status: ACTIVE | Noted: 2024-10-28

## 2024-10-28 PROBLEM — M54.50 MIDLINE LOW BACK PAIN WITHOUT SCIATICA: Status: ACTIVE | Noted: 2024-10-28

## 2024-10-28 LAB
ALBUMIN SERPL BCP-MCNC: 4.1 G/DL (ref 3.5–5.2)
ALP SERPL-CCNC: 81 U/L (ref 40–150)
ALT SERPL W/O P-5'-P-CCNC: 20 U/L (ref 10–44)
ANION GAP SERPL CALC-SCNC: 14 MMOL/L (ref 8–16)
AST SERPL-CCNC: 23 U/L (ref 10–40)
BASOPHILS # BLD AUTO: 0.04 K/UL (ref 0–0.2)
BASOPHILS NFR BLD: 0.8 % (ref 0–1.9)
BILIRUB SERPL-MCNC: 0.7 MG/DL (ref 0.1–1)
BILIRUB UR QL STRIP: NEGATIVE
BNP SERPL-MCNC: 39 PG/ML (ref 0–99)
BUN SERPL-MCNC: 11 MG/DL (ref 6–20)
CALCIUM SERPL-MCNC: 10.4 MG/DL (ref 8.7–10.5)
CHLORIDE SERPL-SCNC: 105 MMOL/L (ref 95–110)
CLARITY UR REFRACT.AUTO: CLEAR
CO2 SERPL-SCNC: 20 MMOL/L (ref 23–29)
COLOR UR AUTO: YELLOW
CREAT SERPL-MCNC: 1.1 MG/DL (ref 0.5–1.4)
CRP SERPL-MCNC: <0.3 MG/L (ref 0–8.2)
DIFFERENTIAL METHOD BLD: ABNORMAL
EOSINOPHIL # BLD AUTO: 0 K/UL (ref 0–0.5)
EOSINOPHIL NFR BLD: 0.4 % (ref 0–8)
ERYTHROCYTE [DISTWIDTH] IN BLOOD BY AUTOMATED COUNT: 19.2 % (ref 11.5–14.5)
EST. GFR  (NO RACE VARIABLE): >60 ML/MIN/1.73 M^2
GLUCOSE SERPL-MCNC: 110 MG/DL (ref 70–110)
GLUCOSE UR QL STRIP: NEGATIVE
HCT VFR BLD AUTO: 30.3 % (ref 37–48.5)
HGB BLD-MCNC: 10.3 G/DL (ref 12–16)
HGB UR QL STRIP: NEGATIVE
IMM GRANULOCYTES # BLD AUTO: 0.02 K/UL (ref 0–0.04)
IMM GRANULOCYTES NFR BLD AUTO: 0.4 % (ref 0–0.5)
KETONES UR QL STRIP: ABNORMAL
LEUKOCYTE ESTERASE UR QL STRIP: NEGATIVE
LIPASE SERPL-CCNC: 12 U/L (ref 4–60)
LYMPHOCYTES # BLD AUTO: 1.8 K/UL (ref 1–4.8)
LYMPHOCYTES NFR BLD: 34.5 % (ref 18–48)
MAGNESIUM SERPL-MCNC: 1.8 MG/DL (ref 1.6–2.6)
MCH RBC QN AUTO: 23.3 PG (ref 27–31)
MCHC RBC AUTO-ENTMCNC: 34 G/DL (ref 32–36)
MCV RBC AUTO: 69 FL (ref 82–98)
MONOCYTES # BLD AUTO: 0.6 K/UL (ref 0.3–1)
MONOCYTES NFR BLD: 10.9 % (ref 4–15)
NEUTROPHILS # BLD AUTO: 2.8 K/UL (ref 1.8–7.7)
NEUTROPHILS NFR BLD: 53 % (ref 38–73)
NITRITE UR QL STRIP: NEGATIVE
NRBC BLD-RTO: 1 /100 WBC
OHS QRS DURATION: 100 MS
OHS QRS DURATION: 84 MS
OHS QRS DURATION: 96 MS
OHS QTC CALCULATION: 476 MS
OHS QTC CALCULATION: 530 MS
OHS QTC CALCULATION: 651 MS
PH UR STRIP: 7 [PH] (ref 5–8)
PLATELET # BLD AUTO: 324 K/UL (ref 150–450)
PMV BLD AUTO: 10 FL (ref 9.2–12.9)
POTASSIUM SERPL-SCNC: 3.5 MMOL/L (ref 3.5–5.1)
PROT SERPL-MCNC: 8.6 G/DL (ref 6–8.4)
PROT UR QL STRIP: NEGATIVE
RBC # BLD AUTO: 4.42 M/UL (ref 4–5.4)
RETICS/RBC NFR AUTO: 0.9 % (ref 0.5–2.5)
SODIUM SERPL-SCNC: 139 MMOL/L (ref 136–145)
SP GR UR STRIP: >1.03 (ref 1–1.03)
TROPONIN I SERPL DL<=0.01 NG/ML-MCNC: <0.006 NG/ML (ref 0–0.03)
URN SPEC COLLECT METH UR: ABNORMAL
WBC # BLD AUTO: 5.3 K/UL (ref 3.9–12.7)

## 2024-10-28 PROCEDURE — 25000003 PHARM REV CODE 250: Performed by: EMERGENCY MEDICINE

## 2024-10-28 PROCEDURE — 81003 URINALYSIS AUTO W/O SCOPE: CPT | Performed by: EMERGENCY MEDICINE

## 2024-10-28 PROCEDURE — 96367 TX/PROPH/DG ADDL SEQ IV INF: CPT

## 2024-10-28 PROCEDURE — 93010 ELECTROCARDIOGRAM REPORT: CPT | Mod: ,,, | Performed by: INTERNAL MEDICINE

## 2024-10-28 PROCEDURE — 25000003 PHARM REV CODE 250

## 2024-10-28 PROCEDURE — 85045 AUTOMATED RETICULOCYTE COUNT: CPT

## 2024-10-28 PROCEDURE — 86140 C-REACTIVE PROTEIN: CPT

## 2024-10-28 PROCEDURE — 63600175 PHARM REV CODE 636 W HCPCS: Performed by: EMERGENCY MEDICINE

## 2024-10-28 PROCEDURE — 96376 TX/PRO/DX INJ SAME DRUG ADON: CPT

## 2024-10-28 PROCEDURE — 25500020 PHARM REV CODE 255: Performed by: EMERGENCY MEDICINE

## 2024-10-28 PROCEDURE — 21400001 HC TELEMETRY ROOM

## 2024-10-28 PROCEDURE — 63600175 PHARM REV CODE 636 W HCPCS: Performed by: FAMILY MEDICINE

## 2024-10-28 PROCEDURE — 96361 HYDRATE IV INFUSION ADD-ON: CPT

## 2024-10-28 PROCEDURE — 93005 ELECTROCARDIOGRAM TRACING: CPT

## 2024-10-28 PROCEDURE — 99223 1ST HOSP IP/OBS HIGH 75: CPT | Mod: GC,,, | Performed by: STUDENT IN AN ORGANIZED HEALTH CARE EDUCATION/TRAINING PROGRAM

## 2024-10-28 PROCEDURE — 36415 COLL VENOUS BLD VENIPUNCTURE: CPT

## 2024-10-28 PROCEDURE — 96375 TX/PRO/DX INJ NEW DRUG ADDON: CPT

## 2024-10-28 PROCEDURE — 96372 THER/PROPH/DIAG INJ SC/IM: CPT

## 2024-10-28 PROCEDURE — 25000003 PHARM REV CODE 250: Performed by: FAMILY MEDICINE

## 2024-10-28 PROCEDURE — 63600175 PHARM REV CODE 636 W HCPCS

## 2024-10-28 RX ORDER — FLUOXETINE HYDROCHLORIDE 20 MG/1
40 CAPSULE ORAL DAILY
Status: DISCONTINUED | OUTPATIENT
Start: 2024-10-28 | End: 2024-10-31 | Stop reason: HOSPADM

## 2024-10-28 RX ORDER — NALOXONE HCL 0.4 MG/ML
0.02 VIAL (ML) INJECTION
Status: DISCONTINUED | OUTPATIENT
Start: 2024-10-28 | End: 2024-10-31 | Stop reason: HOSPADM

## 2024-10-28 RX ORDER — ONDANSETRON 8 MG/1
8 TABLET, ORALLY DISINTEGRATING ORAL EVERY 6 HOURS PRN
Status: DISCONTINUED | OUTPATIENT
Start: 2024-10-28 | End: 2024-10-28

## 2024-10-28 RX ORDER — IBUPROFEN 200 MG
24 TABLET ORAL
Status: DISCONTINUED | OUTPATIENT
Start: 2024-10-28 | End: 2024-10-31 | Stop reason: HOSPADM

## 2024-10-28 RX ORDER — AMLODIPINE BESYLATE 10 MG/1
10 TABLET ORAL DAILY
Status: DISCONTINUED | OUTPATIENT
Start: 2024-10-28 | End: 2024-10-28

## 2024-10-28 RX ORDER — SCOLOPAMINE TRANSDERMAL SYSTEM 1 MG/1
1 PATCH, EXTENDED RELEASE TRANSDERMAL
Status: DISCONTINUED | OUTPATIENT
Start: 2024-10-28 | End: 2024-10-31 | Stop reason: HOSPADM

## 2024-10-28 RX ORDER — DIPHENHYDRAMINE HYDROCHLORIDE 50 MG/ML
25 INJECTION INTRAMUSCULAR; INTRAVENOUS EVERY 4 HOURS PRN
Status: DISCONTINUED | OUTPATIENT
Start: 2024-10-28 | End: 2024-10-31 | Stop reason: HOSPADM

## 2024-10-28 RX ORDER — HYDROMORPHONE HYDROCHLORIDE 1 MG/ML
2 INJECTION, SOLUTION INTRAMUSCULAR; INTRAVENOUS; SUBCUTANEOUS
Status: COMPLETED | OUTPATIENT
Start: 2024-10-28 | End: 2024-10-28

## 2024-10-28 RX ORDER — POTASSIUM CHLORIDE 7.45 MG/ML
10 INJECTION INTRAVENOUS ONCE
Status: COMPLETED | OUTPATIENT
Start: 2024-10-28 | End: 2024-10-28

## 2024-10-28 RX ORDER — GLUCAGON 1 MG
1 KIT INJECTION
Status: DISCONTINUED | OUTPATIENT
Start: 2024-10-28 | End: 2024-10-31 | Stop reason: HOSPADM

## 2024-10-28 RX ORDER — TIZANIDINE 2 MG/1
4 TABLET ORAL EVERY 8 HOURS
Status: COMPLETED | OUTPATIENT
Start: 2024-10-28 | End: 2024-10-29

## 2024-10-28 RX ORDER — PROMETHAZINE HYDROCHLORIDE 12.5 MG/1
12.5 TABLET ORAL EVERY 6 HOURS PRN
Status: DISCONTINUED | OUTPATIENT
Start: 2024-10-28 | End: 2024-10-31 | Stop reason: HOSPADM

## 2024-10-28 RX ORDER — ONDANSETRON 4 MG/1
4 TABLET, ORALLY DISINTEGRATING ORAL EVERY 6 HOURS PRN
Status: DISCONTINUED | OUTPATIENT
Start: 2024-10-28 | End: 2024-10-31 | Stop reason: HOSPADM

## 2024-10-28 RX ORDER — ENOXAPARIN SODIUM 100 MG/ML
1 INJECTION SUBCUTANEOUS EVERY 12 HOURS
Status: DISCONTINUED | OUTPATIENT
Start: 2024-10-28 | End: 2024-10-31 | Stop reason: HOSPADM

## 2024-10-28 RX ORDER — HYDROMORPHONE HYDROCHLORIDE 2 MG/ML
2 INJECTION, SOLUTION INTRAMUSCULAR; INTRAVENOUS; SUBCUTANEOUS ONCE
Status: COMPLETED | OUTPATIENT
Start: 2024-10-28 | End: 2024-10-28

## 2024-10-28 RX ORDER — GABAPENTIN 300 MG/1
300 CAPSULE ORAL 3 TIMES DAILY
Status: DISCONTINUED | OUTPATIENT
Start: 2024-10-28 | End: 2024-10-28

## 2024-10-28 RX ORDER — ALPRAZOLAM 0.25 MG/1
0.25 TABLET ORAL 2 TIMES DAILY PRN
Status: DISCONTINUED | OUTPATIENT
Start: 2024-10-28 | End: 2024-10-31 | Stop reason: HOSPADM

## 2024-10-28 RX ORDER — FAMOTIDINE 10 MG/ML
20 INJECTION INTRAVENOUS
Status: COMPLETED | OUTPATIENT
Start: 2024-10-28 | End: 2024-10-28

## 2024-10-28 RX ORDER — IPRATROPIUM BROMIDE AND ALBUTEROL SULFATE 2.5; .5 MG/3ML; MG/3ML
3 SOLUTION RESPIRATORY (INHALATION) EVERY 4 HOURS PRN
Status: DISCONTINUED | OUTPATIENT
Start: 2024-10-28 | End: 2024-10-31 | Stop reason: HOSPADM

## 2024-10-28 RX ORDER — IBUPROFEN 200 MG
16 TABLET ORAL
Status: DISCONTINUED | OUTPATIENT
Start: 2024-10-28 | End: 2024-10-31 | Stop reason: HOSPADM

## 2024-10-28 RX ORDER — SIMETHICONE 80 MG
1 TABLET,CHEWABLE ORAL 4 TIMES DAILY PRN
Status: DISCONTINUED | OUTPATIENT
Start: 2024-10-28 | End: 2024-10-31 | Stop reason: HOSPADM

## 2024-10-28 RX ORDER — MORPHINE SULFATE 15 MG/1
30 TABLET, FILM COATED, EXTENDED RELEASE ORAL EVERY 12 HOURS
Status: DISCONTINUED | OUTPATIENT
Start: 2024-10-28 | End: 2024-10-28

## 2024-10-28 RX ORDER — SODIUM CHLORIDE 450 MG/100ML
INJECTION, SOLUTION INTRAVENOUS CONTINUOUS
Status: ACTIVE | OUTPATIENT
Start: 2024-10-28 | End: 2024-10-28

## 2024-10-28 RX ORDER — CARVEDILOL 25 MG/1
25 TABLET ORAL 2 TIMES DAILY
Status: DISCONTINUED | OUTPATIENT
Start: 2024-10-28 | End: 2024-10-28

## 2024-10-28 RX ORDER — SODIUM CHLORIDE 0.9 % (FLUSH) 0.9 %
5 SYRINGE (ML) INJECTION
Status: DISCONTINUED | OUTPATIENT
Start: 2024-10-28 | End: 2024-10-31 | Stop reason: HOSPADM

## 2024-10-28 RX ORDER — BISACODYL 10 MG/1
10 SUPPOSITORY RECTAL DAILY PRN
Status: DISCONTINUED | OUTPATIENT
Start: 2024-10-28 | End: 2024-10-31 | Stop reason: HOSPADM

## 2024-10-28 RX ORDER — ALUMINUM HYDROXIDE, MAGNESIUM HYDROXIDE, AND SIMETHICONE 1200; 120; 1200 MG/30ML; MG/30ML; MG/30ML
30 SUSPENSION ORAL 4 TIMES DAILY PRN
Status: DISCONTINUED | OUTPATIENT
Start: 2024-10-28 | End: 2024-10-31 | Stop reason: HOSPADM

## 2024-10-28 RX ORDER — TALC
6 POWDER (GRAM) TOPICAL NIGHTLY PRN
Status: DISCONTINUED | OUTPATIENT
Start: 2024-10-28 | End: 2024-10-31 | Stop reason: HOSPADM

## 2024-10-28 RX ORDER — HYDROMORPHONE HYDROCHLORIDE 1 MG/ML
1 INJECTION, SOLUTION INTRAMUSCULAR; INTRAVENOUS; SUBCUTANEOUS
Status: COMPLETED | OUTPATIENT
Start: 2024-10-28 | End: 2024-10-28

## 2024-10-28 RX ORDER — HYDROMORPHONE HYDROCHLORIDE 1 MG/ML
2 INJECTION, SOLUTION INTRAMUSCULAR; INTRAVENOUS; SUBCUTANEOUS EVERY 6 HOURS PRN
Status: DISCONTINUED | OUTPATIENT
Start: 2024-10-28 | End: 2024-10-28

## 2024-10-28 RX ORDER — FOLIC ACID 1 MG/1
1 TABLET ORAL DAILY
Status: DISCONTINUED | OUTPATIENT
Start: 2024-10-28 | End: 2024-10-31 | Stop reason: HOSPADM

## 2024-10-28 RX ORDER — PROMETHAZINE HYDROCHLORIDE 25 MG/1
25 TABLET ORAL EVERY 6 HOURS PRN
Status: DISCONTINUED | OUTPATIENT
Start: 2024-10-28 | End: 2024-10-28

## 2024-10-28 RX ORDER — AMOXICILLIN 250 MG
1 CAPSULE ORAL DAILY PRN
Status: DISCONTINUED | OUTPATIENT
Start: 2024-10-28 | End: 2024-10-31 | Stop reason: HOSPADM

## 2024-10-28 RX ORDER — GABAPENTIN 300 MG/1
600 CAPSULE ORAL 3 TIMES DAILY
Status: DISCONTINUED | OUTPATIENT
Start: 2024-10-28 | End: 2024-10-31 | Stop reason: HOSPADM

## 2024-10-28 RX ORDER — ENOXAPARIN SODIUM 100 MG/ML
60 INJECTION SUBCUTANEOUS EVERY 12 HOURS
Status: DISCONTINUED | OUTPATIENT
Start: 2024-10-28 | End: 2024-10-28

## 2024-10-28 RX ORDER — POLYETHYLENE GLYCOL 3350 17 G/17G
17 POWDER, FOR SOLUTION ORAL DAILY
Status: DISCONTINUED | OUTPATIENT
Start: 2024-10-28 | End: 2024-10-31 | Stop reason: HOSPADM

## 2024-10-28 RX ADMIN — FAMOTIDINE 20 MG: 10 INJECTION INTRAVENOUS at 04:10

## 2024-10-28 RX ADMIN — GABAPENTIN 600 MG: 300 CAPSULE ORAL at 08:10

## 2024-10-28 RX ADMIN — HYDROMORPHONE HYDROCHLORIDE 2 MG: 1 INJECTION, SOLUTION INTRAMUSCULAR; INTRAVENOUS; SUBCUTANEOUS at 03:10

## 2024-10-28 RX ADMIN — HYDROMORPHONE HYDROCHLORIDE 2 MG: 1 INJECTION, SOLUTION INTRAMUSCULAR; INTRAVENOUS; SUBCUTANEOUS at 07:10

## 2024-10-28 RX ADMIN — HYDROMORPHONE HYDROCHLORIDE 2 MG: 2 INJECTION INTRAMUSCULAR; INTRAVENOUS; SUBCUTANEOUS at 10:10

## 2024-10-28 RX ADMIN — DIPHENHYDRAMINE HYDROCHLORIDE 25 MG: 50 INJECTION, SOLUTION INTRAMUSCULAR; INTRAVENOUS at 10:10

## 2024-10-28 RX ADMIN — GABAPENTIN 600 MG: 300 CAPSULE ORAL at 02:10

## 2024-10-28 RX ADMIN — ENOXAPARIN SODIUM 60 MG: 60 INJECTION SUBCUTANEOUS at 10:10

## 2024-10-28 RX ADMIN — TIZANIDINE 4 MG: 2 TABLET ORAL at 06:10

## 2024-10-28 RX ADMIN — FOLIC ACID 1 MG: 1 TABLET ORAL at 10:10

## 2024-10-28 RX ADMIN — POTASSIUM CHLORIDE 10 MEQ: 7.46 INJECTION, SOLUTION INTRAVENOUS at 03:10

## 2024-10-28 RX ADMIN — PROMETHAZINE HYDROCHLORIDE 12.5 MG: 25 INJECTION INTRAMUSCULAR; INTRAVENOUS at 01:10

## 2024-10-28 RX ADMIN — HYDROMORPHONE HYDROCHLORIDE 1 MG: 1 INJECTION, SOLUTION INTRAMUSCULAR; INTRAVENOUS; SUBCUTANEOUS at 04:10

## 2024-10-28 RX ADMIN — HYDROMORPHONE HYDROCHLORIDE 2 MG: 1 INJECTION, SOLUTION INTRAMUSCULAR; INTRAVENOUS; SUBCUTANEOUS at 12:10

## 2024-10-28 RX ADMIN — SODIUM CHLORIDE: 4.5 INJECTION, SOLUTION INTRAVENOUS at 12:10

## 2024-10-28 RX ADMIN — TIZANIDINE 4 MG: 2 TABLET ORAL at 02:10

## 2024-10-28 RX ADMIN — HYDROMORPHONE HYDROCHLORIDE: 10 INJECTION, SOLUTION INTRAMUSCULAR; INTRAVENOUS; SUBCUTANEOUS at 11:10

## 2024-10-28 RX ADMIN — GABAPENTIN 300 MG: 300 CAPSULE ORAL at 10:10

## 2024-10-28 RX ADMIN — IOHEXOL 100 ML: 350 INJECTION, SOLUTION INTRAVENOUS at 02:10

## 2024-10-28 RX ADMIN — DIPHENHYDRAMINE HYDROCHLORIDE 25 MG: 50 INJECTION, SOLUTION INTRAMUSCULAR; INTRAVENOUS at 08:10

## 2024-10-28 RX ADMIN — HYDROMORPHONE HYDROCHLORIDE: 10 INJECTION, SOLUTION INTRAMUSCULAR; INTRAVENOUS; SUBCUTANEOUS at 12:10

## 2024-10-28 RX ADMIN — HYDROMORPHONE HYDROCHLORIDE 2 MG: 1 INJECTION, SOLUTION INTRAMUSCULAR; INTRAVENOUS; SUBCUTANEOUS at 01:10

## 2024-10-28 RX ADMIN — ALPRAZOLAM 0.25 MG: 0.25 TABLET ORAL at 06:10

## 2024-10-28 RX ADMIN — TIZANIDINE 4 MG: 2 TABLET ORAL at 08:10

## 2024-10-28 RX ADMIN — SODIUM CHLORIDE, POTASSIUM CHLORIDE, SODIUM LACTATE AND CALCIUM CHLORIDE 500 ML: 600; 310; 30; 20 INJECTION, SOLUTION INTRAVENOUS at 10:10

## 2024-10-28 RX ADMIN — OXYCODONE 15 MG: 5 TABLET ORAL at 06:10

## 2024-10-28 RX ADMIN — DIPHENHYDRAMINE HYDROCHLORIDE 25 MG: 50 INJECTION, SOLUTION INTRAMUSCULAR; INTRAVENOUS at 03:10

## 2024-10-28 RX ADMIN — FLUOXETINE HYDROCHLORIDE 40 MG: 20 CAPSULE ORAL at 10:10

## 2024-10-28 RX ADMIN — MORPHINE SULFATE 30 MG: 15 TABLET, EXTENDED RELEASE ORAL at 10:10

## 2024-10-28 RX ADMIN — ENOXAPARIN SODIUM 100 MG: 60 INJECTION SUBCUTANEOUS at 08:10

## 2024-10-28 RX ADMIN — SCOPALAMINE 1 PATCH: 1 PATCH, EXTENDED RELEASE TRANSDERMAL at 04:10

## 2024-10-28 NOTE — CONSULTS
[facility] - Observation [facility]H  Gastroenterology  Consult Note    Patient Name: Nazanin Malone  MRN: [id number]  Admission Date: 10/27/2024  Hospital Length of Stay: 0 days  Code Status: Full Code   Attending Provider: Chase Gasca III*   Consulting Provider: Daiana Granger MD  Primary Care Physician: Pee Montgomery MD  Principal Problem:Sickle-cell disease with pain    Inpatient consult to Gastroenterology  Consult performed by: Daiana Granger MD  Consult ordered by: Sussy Aleman PA-C        Subjective:     HPI:  Nazanin Malone is a 44 y.o. 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 She complains of ongoing pain in her abdomen and paraspinal area that has been occurring for the past few weeks. The pain in the abdomen is mostly in the RUQ and feels like a cramping sensation. Also epigastric area and diffusely throughout the abdomen to a lesser degree. Nothing makes this pain better or worse (such as eating, drinking, or her home morphine/oxycodone). She reports decreased PO intake 2/2 nausea though she is tolerating liquids. No longer having emesis. No diarrhea or constipation. No hematochezia, melena, hematemesis, hemoptysis.     In the ED: Afebrile, HR 90-110s. Hypertensive. Otherwise HDS. Hbg 10.3. WBC normal. Lipase normal 12. LFTs normal. BNP and troponin normal. CXR without acute process. Given 1L NS, multiple rounds of IV hydromorphone, potassium, magnesium, phenergan, Pepcid.  CT A/P with soft tissue nodules in abdominal wall likely 2/2 injections as well as nonspecific fullness of gastric body, correlation with EGD advised. GI consulted for further evaluation of abdominal pain.    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    Asthma     Depression     Hypertension     Morbid  obesity     Opioid dependence 2017    Pneumonia due to Streptococcus pneumoniae 2017    Right lower lobe pneumonia 2017    Sepsis due to Streptococcus pneumoniae 2017    Sickle cell-beta thalassemia disease with pain     Trigeminal neuralgia        Past Surgical History:   Procedure Laterality Date     SECTION      TONSILLECTOMY      TUBAL LIGATION         Review of patient's allergies indicates:   Allergen Reactions    Cat dander Anaphylaxis, Itching and Shortness Of Breath    Nsaids (non-steroidal anti-inflammatory drug) Itching and Anaphylaxis    Latex Rash     Family History       Problem Relation (Age of Onset)    Diabetes Mother    Heart disease Mother, Father          Tobacco Use    Smoking status: Never    Smokeless tobacco: Never   Substance and Sexual Activity    Alcohol use: No    Drug use: Yes     Types: Marijuana     Comment: periodically     Sexual activity: Not Currently     Birth control/protection: See Surgical Hx     Review of Systems   Constitutional:  Positive for appetite change. Negative for fever.   Gastrointestinal:  Positive for abdominal pain. Negative for blood in stool, constipation, diarrhea, nausea and vomiting.     Objective:     Vital Signs (Most Recent):  Temp: 97.3 °F (36.3 °C) (10/28/24 0753)  Pulse: 73 (10/28/24 0753)  Resp: 16 (10/28/24 1000)  BP: 96/64 (10/28/24 0753)  SpO2: 99 % (10/28/24 0753) Vital Signs (24h Range):  Temp:  [97.3 °F (36.3 °C)-99.6 °F (37.6 °C)] 97.3 °F (36.3 °C)  Pulse:  [] 73  Resp:  [11-20] 16  SpO2:  [98 %-100 %] 99 %  BP: ()/() 96/64     Weight: 95.7 kg (210 lb 15.7 oz) (10/28/24 0551)  Body mass index is 38.59 kg/m².      Intake/Output Summary (Last 24 hours) at 10/28/2024 1002  Last data filed at 10/28/2024 0412  Gross per 24 hour   Intake 1199 ml   Output --   Net 1199 ml       Lines/Drains/Airways       Central Venous Catheter Line  Duration                  PowerPort A Cath Single Lumen 10/27/24 7128  Subclavian Right <1 day                     Physical Exam  Vitals and nursing note reviewed.   Constitutional:       General: She is not in acute distress.     Appearance: Normal appearance.   HENT:      Head: Normocephalic and atraumatic.      Right Ear: External ear normal.      Left Ear: External ear normal.      Nose: Nose normal.      Mouth/Throat:      Pharynx: Oropharynx is clear.   Eyes:      General: No scleral icterus.     Conjunctiva/sclera: Conjunctivae normal.   Cardiovascular:      Rate and Rhythm: Normal rate and regular rhythm.      Pulses: Normal pulses.   Pulmonary:      Effort: Pulmonary effort is normal. No respiratory distress.   Abdominal:      General: There is no distension.      Palpations: Abdomen is soft.      Tenderness: There is abdominal tenderness (RLQ tenderness). There is no guarding or rebound.   Skin:     General: Skin is warm and dry.   Neurological:      General: No focal deficit present.      Mental Status: She is alert and oriented to person, place, and time.          Significant Labs:  All pertinent lab results from the last 24 hours have been reviewed.    Significant Imaging:  Imaging results within the past 24 hours have been reviewed.  Assessment/Plan:     GI  Abdominal pain  Patient here for suspected sickle cell pain crisis. Reports abd pain mostly RUQ cramping as well as epigastric pain. Nothing makes it better or worse (no a/w food, no improvement w home pain meds). RLQ TTP on physical exam, no rebound or guarding. No N/V/D. Lipase wnl. LFT wnl. CT A/P: w/ soft tissue nodules in abd wall likely 2/2 injections; nonspecific fullness of gastric body. Correlation with EGD recommended on imaging study. Denies NSAID use. No hematochezia, melena, hematemesis, hemoptysis.    - Will defer EGD as not clinically relevant at this time  - continue to monitor for worsening of symptoms  - Will follow-up in outpatient GI clinic         Thank you for your consult. I will sign off.  Please contact us if you have any additional questions.    Daiana Granger MD  Gastroenterology  [facility] - Observation 11H

## 2024-10-28 NOTE — H&P
[facility] [address] [place] [address] Medicine  History & Physical    Patient Name: Nazanin Malone  MRN: [id number]  Patient Class: OP- Observation  Admission Date: 10/27/2024  Attending Physician: Chase Gasca III*   Primary Care Provider: Pee Montgomery MD         Patient information was obtained from patient, past medical records, and ER records.     Subjective:     Principal Problem:Sickle-cell disease with pain    Chief Complaint:   Chief Complaint   Patient presents with    Sickle Cell Pain Crisis     Pt endorsing chest pain and abdominal pain. + shortness of breath. Home pain medications w/o relief.        HPI: Nazanin Malone is a 44 y.o. 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 She complains of ongoing pain in her abdomen and paraspinal area. The pain in the abdomen is mostly in the RUQ and feels like a cramping sensation. Also epigastric area and diffusely throughout the abdomen to a lesser degree. Nothing makes this pain better or worse (such as eating, drinking, or her home morphine/oxycodone). The pain does not feeling like indigestion. She reports decreased PO intake 2/2 nausea though she is tolerating liquids. No longer having emesis. No diarrhea or constipation. No blood BM, no dysuria. She also reports pain that is most significant around her lumbar spine and radiates up to her cervical spine area. Feels like spasms. Zanaflex and heat do help that pain. Denies any positional component to her back pain. Denies radiation into her legs. No numbness.tingling. No injury, heavy lifting, trauma, or inciting event. She reports she had this pain last admission and it has not resolved. She feels it is related to sickle cell. She moved from [place] and has not established with a hematologist here yet, but does have an appointment scheduled for 11/20. Concerned she will run out of oral pain  medications before then.    In the ED: Afebrile, HR 90-110s. Hypertensive. Otherwise HDS. Hbg 10.3. WBC normal. Lipase normal 12. LFTs normal. BNP and troponin normal. Cr stable 1.1. CXR without acute process. CT AP pending at time of admission.  Given 1L NS, multiple rounds of IV hydromorphone, potassium, magnesium, phenergan, Pepcid. Admitted to .    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    Asthma     Depression     Hypertension     Morbid obesity     Opioid dependence 2017    Pneumonia due to Streptococcus pneumoniae 2017    Right lower lobe pneumonia 2017    Sepsis due to Streptococcus pneumoniae 2017    Sickle cell-beta thalassemia disease with pain     Trigeminal neuralgia        Past Surgical History:   Procedure Laterality Date     SECTION      TONSILLECTOMY      TUBAL LIGATION         Review of patient's allergies indicates:   Allergen Reactions    Cat dander Anaphylaxis, Itching and Shortness Of Breath    Nsaids (non-steroidal anti-inflammatory drug) Itching and Anaphylaxis    Latex Rash       No current facility-administered medications on file prior to encounter.     Current Outpatient Medications on File Prior to Encounter   Medication Sig    acetaminophen (TYLENOL) 500 MG tablet Take 1,000 mg by mouth every 8 (eight) hours as needed for Pain.    albuterol (VENTOLIN HFA) 90 mcg/actuation inhaler Inhale 2 puffs into the lungs every 6 (six) hours as needed for Wheezing or Shortness of Breath. Rescue    ALPRAZolam (XANAX) 0.25 MG tablet Take 1 tablet (0.25 mg total) by mouth 2 (two) times daily as needed for Anxiety.    amLODIPine (NORVASC) 10 MG tablet Take 1 tablet (10 mg total) by mouth once daily.    ascorbic acid (VITAMIN C ORAL) Take 1 capsule by mouth once daily.    carvediloL (COREG) 25 MG tablet Take 1 tablet (25 mg total) by mouth 2 (two) times daily.     enoxaparin (LOVENOX) 60 mg/0.6 mL Syrg Inject 0.6 mLs (60 mg total) into the skin every 12 (twelve) hours.    FLUoxetine 40 MG capsule Take 1 capsule (40 mg total) by mouth once daily.    fluticasone propionate (FLONASE) 50 mcg/actuation nasal spray INSTILL 1 SPRAY INTO EACH NOSTRIL EVERY DAY    folic acid (FOLVITE) 1 MG tablet Take 1 tablet (1 mg total) by mouth once daily.    gabapentin (NEURONTIN) 300 MG capsule Take 1 capsule (300 mg total) by mouth 3 (three) times daily.    morphine (MS CONTIN) 30 MG 12 hr tablet Take 1 tablet (30 mg total) by mouth every 12 (twelve) hours. for 7 days    oxyCODONE (ROXICODONE) 15 MG Tab Take 1 tablet (15 mg total) by mouth every 4 (four) hours as needed for Pain.    promethazine (PHENERGAN) 25 MG tablet Take 1 tablet (25 mg total) by mouth every 6 (six) hours as needed for Nausea.    senna-docusate 8.6-50 mg (PERICOLACE) 8.6-50 mg per tablet Take 1 tablet by mouth daily as needed for Constipation.    tiZANidine (ZANAFLEX) 4 MG tablet Take 1 tablet (4 mg total) by mouth every 8 (eight) hours as needed.     Family History       Problem Relation (Age of Onset)    Diabetes Mother    Heart disease Mother, Father          Tobacco Use    Smoking status: Never    Smokeless tobacco: Never   Substance and Sexual Activity    Alcohol use: No    Drug use: Yes     Types: Marijuana     Comment: periodically     Sexual activity: Not Currently     Birth control/protection: See Surgical Hx     Review of Systems   Constitutional:  Negative for chills and fever.   HENT:  Negative for trouble swallowing.    Eyes:  Negative for photophobia and visual disturbance.   Respiratory:  Negative for cough and shortness of breath.    Cardiovascular:  Negative for chest pain, palpitations and leg swelling.   Gastrointestinal:  Positive for abdominal pain and nausea. Negative for blood in stool, constipation, diarrhea and vomiting.   Genitourinary:  Negative for dysuria and flank pain.   Musculoskeletal:   Positive for back pain. Negative for arthralgias and gait problem.   Skin:  Negative for rash and wound.   Neurological:  Negative for light-headedness and numbness.   Psychiatric/Behavioral:  Negative for agitation and confusion.      Objective:     Vital Signs (Most Recent):  Temp: 98.8 °F (37.1 °C) (10/27/24 2201)  Pulse: 94 (10/28/24 0400)  Resp: 16 (10/28/24 0418)  BP: (!) 166/91 (10/28/24 0400)  SpO2: 99 % (10/28/24 0400) Vital Signs (24h Range):  Temp:  [98.8 °F (37.1 °C)] 98.8 °F (37.1 °C)  Pulse:  [] 94  Resp:  [11-20] 16  SpO2:  [98 %-100 %] 99 %  BP: (138-192)/() 166/91     Weight: 94.3 kg (208 lb)  Body mass index is 38.04 kg/m².     Physical Exam  Vitals and nursing note reviewed.   Constitutional:       General: She is not in acute distress.  HENT:      Head: Normocephalic and atraumatic.      Nose: Nose normal.      Mouth/Throat:      Pharynx: No oropharyngeal exudate.   Eyes:      Extraocular Movements: Extraocular movements intact.      Conjunctiva/sclera: Conjunctivae normal.   Cardiovascular:      Rate and Rhythm: Normal rate and regular rhythm.      Heart sounds: Normal heart sounds.   Pulmonary:      Effort: Pulmonary effort is normal. No respiratory distress.      Breath sounds: Normal breath sounds.   Abdominal:      General: Bowel sounds are normal. There is no distension.      Palpations: Abdomen is soft.      Tenderness: There is abdominal tenderness in the right upper quadrant. There is no guarding or rebound.   Musculoskeletal:      Cervical back: Normal range of motion. Tenderness present. No edema or deformity. No pain with movement.      Thoracic back: Tenderness present. No edema or deformity.      Lumbar back: Tenderness present. No edema or deformity.   Skin:     General: Skin is warm and dry.      Comments: Scar noted to RLE   Neurological:      General: No focal deficit present.      Mental Status: She is alert and oriented to person, place, and time.   Psychiatric:          Mood and Affect: Mood normal.         Behavior: Behavior normal.                Significant Labs: All pertinent labs within the past 24 hours have been reviewed.  CBC:   Recent Labs   Lab 10/27/24  2325   WBC 5.30   HGB 10.3*   HCT 30.3*        CMP:   Recent Labs   Lab 10/27/24  2325      K 3.5      CO2 20*      BUN 11   CREATININE 1.1   CALCIUM 10.4   PROT 8.6*   ALBUMIN 4.1   BILITOT 0.7   ALKPHOS 81   AST 23   ALT 20   ANIONGAP 14     Cardiac Markers:   Recent Labs   Lab 10/27/24  2325   BNP 39     Lipase:   Recent Labs   Lab 10/27/24  2325   LIPASE 12     Magnesium:   Recent Labs   Lab 10/27/24  2325   MG 1.8       Significant Imaging: I have reviewed all pertinent imaging results/findings within the past 24 hours.  CT Abdomen Pelvis With IV Contrast NO Oral Contrast  Narrative: EXAMINATION:  CT ABDOMEN PELVIS WITH IV CONTRAST    CLINICAL HISTORY:  Abdominal pain, acute, nonlocalized;    TECHNIQUE:  Axial images of the abdomen and pelvis were acquired after the use of 100 cc [id number] IV contrast. No oral contrast was administered.  Coronal and sagittal reconstructions were also obtained    COMPARISON:  CT abdomen pelvis 10/21/2024, CTA chest 08/05/2024.    FINDINGS:  Heart: Mildly enlarged.  No pericardial effusion. No significant calcific coronary atherosclerosis.    Lungs: Stable 6 mm right lower lobe nodule (series 2, image 17).  Subsegmental and dependent atelectasis in the lung bases.  No focal consolidation, significant pleural fluid or pneumothorax.    Hepatobiliary: Liver is normal size.  Normal contour.  Ill-defined streaky hyperattenuation along the right hepatic lobe, similar to priors.  Subcentimeter right hepatic lobe hypodensity that is too small to adequately characterize.  No calcified gallstones. No pericholecystic fluid or gallbladder wall thickening.No intrahepatic or extrahepatic biliary ductal dilatation.    Pancreas: Peripancreatic inflammatory change/fat  stranding.    Spleen: Atrophic with coarse peripheral calcifications likely secondary to patient's reported history of sickle cell disease.    Adrenals: Unremarkable.    Kidneys/Ureters: Normal in size and location. Normal enhancement. No hydronephrosis or nephrolithiasis. No ureteral dilatation.    Bladder: No evidence of wall thickening.    Reproductive organs: Unremarkable.    Peritoneum: No free air or free fluid.    Retroperitoneum: No pathologically enlarged abdominopelvic lymph nodes.    Bowel/Mesentery: The stomach once again demonstrates slight fullness in the region of the gastric body which could relate to nondistention although correlation with EGD advised.  Small bowel is normal in caliber with no evidence of obstruction or inflammation. Colon demonstrates no focal wall thickening or obstruction. Appendix is visualized and unremarkable.    Abdominal wall:  Numerous soft tissue nodules in the subcutaneous soft tissues with scattered foci of gas which are favored to represent sequela of injections.    Vasculature: No aneurysm. No significant calcific atherosclerosis.  Portal vasculature is patent.    Bones: No acute fracture. Degenerative changes of the spine.  No suspicious osseous lesions.  Impression: Redemonstration of multiple soft tissue density nodules within the soft tissues of the anterior abdominal wall with increased number of foci of soft tissue gas on today's exam relative to prior exam of 10/21/2024.  This could reflect sequela of injections although correlation for soft tissue infection/cellulitis is advised.    Redemonstration of fullness in the region of the gastric body, nonspecific and possibly relating to nondistention although correlation with EGD is once again advised.    Stable 6 mm right lower lobe pulmonary nodule.  Consider follow-up chest CT in 6-12 months to assess for stability.    Additional findings as above.    Electronically signed by resident: Burton  Odilia  Date:    10/28/2024  Time:    03:41    Electronically signed by: Beth Colvin MD  Date:    10/28/2024  Time:    05:01     Assessment/Plan:     * Sickle-cell disease with pain  Presenting with pain in the back and abdomen, she feels possible from sickle cell disease. See abdominal pain and back pain below.   - Hbg 10.3. LFTs normal. Retic count pending  - Multimodal pain mgmt: scheduled tylenol, gabapentin, Zanaflex. PRN Oxy 15 q4hs, MsContin 30 BID, dilaudid 2 mg for breakthrough  - keep patient hydrated with IV fluids or PO if able to tolerate  - use incentive spirometer to reduce incidence of acute chest syndrome  - continue VTE regimen  - take folic acid daily   - daily CBC  - Heme/onc appointment on 11/20/24    Midline low back pain without sciatica  Reports spasm type pain worst in the lower back radiating up the spine/paraspinal area to the the lower part of the neck for a few weeks now. Notes Zanaflex and heat help. No weakness/numbness/fever/trauma.   Likely sickle cell pain crisis vs muscle spasms vs other non specific back pain    - CT AP shows Degenerative changes of the spine. No suspicious osseous lesions  - Continue home MS contin 30 mg q12h, oxycodone 15 mg Q4h  - Continue home Zanaflex 4 mg Q8h  - Apply Heat  - PRN hydromorphone for breakthrough   - IV hydration if not tolerating PO, otherwise encourage PO hydration iso sickle cell pain    Right upper quadrant abdominal pain  Complaining of a few weeks of generalized abdominal pain, worst in RUQ, feels like cramping. Associated with decreased PO tolerance, nausea. Does not feel like ingestion. Nothing makes it better/worse. No diarrhea, constipation, fever, dysuria, bloody BM. Unclear etiology of this pain. CT shows Re demonstration of multiple soft tissue density nodules likely sequela of injections as well as re demonstration of fullness in the region of the gastric body, nonspecific and possibly relating to nondistention although  correlation with EGD is once again advised. Labs on admission show Lipase normal. WBC normal. LFTs normal.     - NPO for now  - GI consulted, appreciate recs  - PRN antiemetics (monitor Qtc)  - Home pain and bowel regimen     Thrombosis of internal carotid, left  - continue lovenox injections BID     History of pulmonary embolism  - continue lovenox injections BID     Trigeminal neuralgia  - Noted. Continue home gabapentin     Hypertension  Patients blood pressure range in the last 24 hours was: BP  Min: 138/78  Max: 192/101.The patient's inpatient anti-hypertensive regimen is listed below:  Current Antihypertensives  amLODIPine tablet 10 mg, Daily, Oral  carvediloL tablet 25 mg, 2 times daily, Oral  Plan  - BP is controlled, no changes needed to their regimen      VTE Risk Mitigation (From admission, onward)           Ordered     enoxaparin injection 60 mg  Every 12 hours         10/28/24 0505     IP VTE LOW RISK PATIENT  Once         10/28/24 0432     Place sequential compression device  Until discontinued         10/28/24 0432                         On 10/28/2024, patient should be placed in hospital observation services under my care in collaboration with Chung Condon DO.           Sussy Aleman PA-C  Department of Hospital Medicine  [facility]

## 2024-10-28 NOTE — SUBJECTIVE & OBJECTIVE
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    Asthma     Depression     Hypertension     Morbid obesity     Opioid dependence 2017    Pneumonia due to Streptococcus pneumoniae 2017    Right lower lobe pneumonia 2017    Sepsis due to Streptococcus pneumoniae 2017    Sickle cell-beta thalassemia disease with pain     Trigeminal neuralgia        Past Surgical History:   Procedure Laterality Date     SECTION      TONSILLECTOMY      TUBAL LIGATION         Review of patient's allergies indicates:   Allergen Reactions    Cat dander Anaphylaxis, Itching and Shortness Of Breath    Nsaids (non-steroidal anti-inflammatory drug) Itching and Anaphylaxis    Latex Rash     Family History       Problem Relation (Age of Onset)    Diabetes Mother    Heart disease Mother, Father          Tobacco Use    Smoking status: Never    Smokeless tobacco: Never   Substance and Sexual Activity    Alcohol use: No    Drug use: Yes     Types: Marijuana     Comment: periodically     Sexual activity: Not Currently     Birth control/protection: See Surgical Hx     Review of Systems   Constitutional:  Positive for appetite change. Negative for fever.   Gastrointestinal:  Positive for abdominal pain. Negative for blood in stool, constipation, diarrhea, nausea and vomiting.     Objective:     Vital Signs (Most Recent):  Temp: 97.3 °F (36.3 °C) (10/28/24 0753)  Pulse: 73 (10/28/24 0753)  Resp: 16 (10/28/24 1000)  BP: 96/64 (10/28/24 0753)  SpO2: 99 % (10/28/24 0753) Vital Signs (24h Range):  Temp:  [97.3 °F (36.3 °C)-99.6 °F (37.6 °C)] 97.3 °F (36.3 °C)  Pulse:  [] 73  Resp:  [11-20] 16  SpO2:  [98 %-100 %] 99 %  BP: ()/() 96/64     Weight: 95.7 kg (210 lb 15.7 oz) (10/28/24 0551)  Body mass index is 38.59 kg/m².      Intake/Output Summary (Last 24 hours) at 10/28/2024 1002  Last data filed at  10/28/2024 0412  Gross per 24 hour   Intake 1199 ml   Output --   Net 1199 ml       Lines/Drains/Airways       Central Venous Catheter Line  Duration                  PowerPort A Cath Single Lumen 10/27/24 2337 Subclavian Right <1 day                     Physical Exam  Vitals and nursing note reviewed.   Constitutional:       General: She is not in acute distress.     Appearance: Normal appearance.   HENT:      Head: Normocephalic and atraumatic.      Right Ear: External ear normal.      Left Ear: External ear normal.      Nose: Nose normal.      Mouth/Throat:      Pharynx: Oropharynx is clear.   Eyes:      General: No scleral icterus.     Conjunctiva/sclera: Conjunctivae normal.   Cardiovascular:      Rate and Rhythm: Normal rate and regular rhythm.      Pulses: Normal pulses.   Pulmonary:      Effort: Pulmonary effort is normal. No respiratory distress.   Abdominal:      General: There is no distension.      Palpations: Abdomen is soft.      Tenderness: There is abdominal tenderness (RLQ tenderness). There is no guarding or rebound.   Skin:     General: Skin is warm and dry.   Neurological:      General: No focal deficit present.      Mental Status: She is alert and oriented to person, place, and time.          Significant Labs:  All pertinent lab results from the last 24 hours have been reviewed.    Significant Imaging:  Imaging results within the past 24 hours have been reviewed.

## 2024-10-28 NOTE — PLAN OF CARE
Problem: Adult Inpatient Plan of Care  Goal: Plan of Care Review  Reactivated  Goal: Patient-Specific Goal (Individualized)  Reactivated  Goal: Absence of Hospital-Acquired Illness or Injury  Reactivated  Goal: Optimal Comfort and Wellbeing  Reactivated  Goal: Readiness for Transition of Care  Reactivated     Problem: Infection  Goal: Absence of Infection Signs and Symptoms  Reactivated     Problem: Pneumonia  Goal: Fluid Balance  Reactivated  Goal: Resolution of Infection Signs and Symptoms  Reactivated  Goal: Effective Oxygenation and Ventilation  Reactivated

## 2024-10-28 NOTE — ED PROVIDER NOTES
Encounter Date: 10/27/2024       History     Chief Complaint   Patient presents with    Sickle Cell Pain Crisis     Pt endorsing chest pain and abdominal pain. + shortness of breath. Home pain medications w/o relief.     44-year-old female with history of sickle cell disease acute chest syndrome opiate dependence presents with several days of abdominal pain with associated vomiting.  Pain is to the abdomen it radiates to the back.  She was just in the hospital for similar type symptoms.  She has some shortness of breath as well.        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    Asthma     Depression     Hypertension     Morbid obesity     Opioid dependence 2017    Pneumonia due to Streptococcus pneumoniae 2017    Right lower lobe pneumonia 2017    Sepsis due to Streptococcus pneumoniae 2017    Sickle cell-beta thalassemia disease with pain     Trigeminal neuralgia      Past Surgical History:   Procedure Laterality Date     SECTION      TONSILLECTOMY      TUBAL LIGATION       Family History   Problem Relation Name Age of Onset    Heart disease Mother      Diabetes Mother      Heart disease Father      Breast cancer Neg Hx      Ovarian cancer Neg Hx      Colon cancer Neg Hx       Social History     Tobacco Use    Smoking status: Never    Smokeless tobacco: Never   Substance Use Topics    Alcohol use: No    Drug use: Yes     Types: Marijuana     Comment: periodically      Review of Systems    Physical Exam     Initial Vitals [10/27/24 2201]   BP Pulse Resp Temp SpO2   138/78 (!) 115 16 98.8 °F (37.1 °C) 98 %      MAP       --         Physical Exam    Constitutional:    Patient appears uncomfortable   HENT:   Head: Normocephalic and atraumatic.   Eyes: Conjunctivae are normal.   Neck: Neck supple. No tracheal deviation present. No JVD present.   Normal range of motion.  Cardiovascular:  Normal rate, regular rhythm, normal heart sounds and intact distal pulses.           Equal radial pulses   Pulmonary/Chest: Breath sounds normal. No respiratory distress. She has no wheezes. She has no rhonchi. She has no rales.   Abdominal: Abdomen is soft. Bowel sounds are normal. She exhibits no distension. There is abdominal tenderness (Diffuse tenderness). There is no rebound.   Musculoskeletal:         General: No edema.      Cervical back: Normal range of motion and neck supple.      Comments: No sign of DVT     Neurological: She is alert. She has normal strength. No sensory deficit. GCS score is 15. GCS eye subscore is 4. GCS verbal subscore is 5. GCS motor subscore is 6.   Skin: Skin is warm. No rash noted.   Psychiatric: She has a normal mood and affect.         ED Course   Procedures  Labs Reviewed   CBC W/ AUTO DIFFERENTIAL - Abnormal       Result Value    WBC 5.30      RBC 4.42      Hemoglobin 10.3 (*)     Hematocrit 30.3 (*)     MCV 69 (*)     MCH 23.3 (*)     MCHC 34.0      RDW 19.2 (*)     Platelets 324      MPV 10.0      Immature Granulocytes 0.4      Gran # (ANC) 2.8      Immature Grans (Abs) 0.02      Lymph # 1.8      Mono # 0.6      Eos # 0.0      Baso # 0.04      nRBC 1 (*)     Gran % 53.0      Lymph % 34.5      Mono % 10.9      Eosinophil % 0.4      Basophil % 0.8      Differential Method Automated     COMPREHENSIVE METABOLIC PANEL - Abnormal    Sodium 139      Potassium 3.5      Chloride 105      CO2 20 (*)     Glucose 110      BUN 11      Creatinine 1.1      Calcium 10.4      Total Protein 8.6 (*)     Albumin 4.1      Total Bilirubin 0.7      Alkaline Phosphatase 81      AST 23      ALT 20      eGFR >60.0      Anion Gap 14     LIPASE    Lipase 12     TROPONIN I    Troponin  I <0.006     MAGNESIUM    Magnesium 1.8     B-TYPE NATRIURETIC PEPTIDE    BNP 39     URINALYSIS, REFLEX TO URINE CULTURE   POCT URINE PREGNANCY   ISTAT CHEM8     EKG Readings: (Independently Interpreted)   Sinus tachycardia at 108.  Prolonged QTc at 651       Imaging Results              X-Ray Chest 1 View (Final result)  Result time 10/27/24 23:27:09      Final result by Beth Colvin MD (10/27/24 23:27:09)                   Impression:      Please see above.      Electronically signed by: Beth Colvin MD  Date:    10/27/2024  Time:    23:27               Narrative:    EXAMINATION:  XR CHEST 1 VIEW    CLINICAL HISTORY:  Chest pain, unspecified    TECHNIQUE:  Single frontal view of the chest was performed.    COMPARISON:  10/21/2024    FINDINGS:  There is a stably positioned right-sided chest port in place.  The cardiomediastinal silhouette is unchanged in size and configuration.  Mediastinal structures are midline.  The lungs are symmetrically expanded with diffuse coarse/increased interstitial attenuation which can be seen with interstitial edema.  No significant interval detrimental change in lung aeration or evidence of new or enlarging pneumothorax.  Thin linear opacity projects over the left lower lung zone suggestive of atelectasis and/or scarring.  Osseous structures are intact.                                       Medications   promethazine (PHENERGAN) 12.5 mg in 0.9% NaCl 50 mL IVPB (has no administration in time range)   potassium chloride 10 mEq in 100 mL IVPB (has no administration in time range)   sodium chloride 0.9% bolus 1,000 mL 1,000 mL (0 mLs Intravenous Stopped 10/28/24 0038)   magnesium sulfate 2g in water 50mL IVPB (premix) (0 g Intravenous Stopped 10/28/24 0038)   HYDROmorphone injection 2 mg (2 mg Intravenous Given 10/27/24 2335)   HYDROmorphone injection 2 mg (2 mg Intravenous Given 10/28/24 0026)   HYDROmorphone injection 2 mg (2 mg Intravenous Given 10/28/24 0109)     Medical Decision  Making  Patient with abdominal pain and symptoms of pain concerning her for sickle cell pain crisis.  I reviewed her records.  She has been in the hospital twice already in the this month.  Her EKG reveals a prolonged QT. will give fluids magnesium and pain control.  Her QTC narrow and I gave her a dose of Phenergan.  Still having significant symptoms.  Case signed out to Dr. Vicente at 2:00 a.m. with CT and urine studies pending    Amount and/or Complexity of Data Reviewed  Labs: ordered.  Radiology: ordered.    Risk  Prescription drug management.                                      Clinical Impression:  Final diagnoses:  [R07.9] Chest pain  [R94.31] Prolonged QT interval  [R10.9] Abdominal pain, unspecified abdominal location (Primary)                 Waldemar Lantigua MD  10/28/24 0155

## 2024-10-28 NOTE — ASSESSMENT & PLAN NOTE
Presenting with pain in the back and abdomen, she feels possible from sickle cell disease. See abdominal pain and back pain below.   - Hbg 10.3. LFTs normal. Retic count pending  - Multimodal pain mgmt: scheduled tylenol, gabapentin, Zanaflex. PRN Oxy 15 q4hs, MsContin 30 BID, dilaudid 2 mg for breakthrough  - keep patient hydrated with IV fluids or PO if able to tolerate  - use incentive spirometer to reduce incidence of acute chest syndrome  - continue VTE regimen  - take folic acid daily   - daily CBC  - Heme/onc appointment on 11/20/24

## 2024-10-28 NOTE — SUBJECTIVE & OBJECTIVE
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    Asthma     Depression     Hypertension     Morbid obesity     Opioid dependence 2017    Pneumonia due to Streptococcus pneumoniae 2017    Right lower lobe pneumonia 2017    Sepsis due to Streptococcus pneumoniae 2017    Sickle cell-beta thalassemia disease with pain     Trigeminal neuralgia        Past Surgical History:   Procedure Laterality Date     SECTION      TONSILLECTOMY      TUBAL LIGATION         Review of patient's allergies indicates:   Allergen Reactions    Cat dander Anaphylaxis, Itching and Shortness Of Breath    Nsaids (non-steroidal anti-inflammatory drug) Itching and Anaphylaxis    Latex Rash       No current facility-administered medications on file prior to encounter.     Current Outpatient Medications on File Prior to Encounter   Medication Sig    acetaminophen (TYLENOL) 500 MG tablet Take 1,000 mg by mouth every 8 (eight) hours as needed for Pain.    albuterol (VENTOLIN HFA) 90 mcg/actuation inhaler Inhale 2 puffs into the lungs every 6 (six) hours as needed for Wheezing or Shortness of Breath. Rescue    ALPRAZolam (XANAX) 0.25 MG tablet Take 1 tablet (0.25 mg total) by mouth 2 (two) times daily as needed for Anxiety.    amLODIPine (NORVASC) 10 MG tablet Take 1 tablet (10 mg total) by mouth once daily.    ascorbic acid (VITAMIN C ORAL) Take 1 capsule by mouth once daily.    carvediloL (COREG) 25 MG tablet Take 1 tablet (25 mg total) by mouth 2 (two) times daily.    enoxaparin (LOVENOX) 60 mg/0.6 mL Syrg Inject 0.6 mLs (60 mg total) into the skin every 12 (twelve) hours.    FLUoxetine 40 MG capsule Take 1 capsule (40 mg total) by mouth once daily.    fluticasone propionate (FLONASE) 50 mcg/actuation nasal spray INSTILL 1 SPRAY INTO EACH NOSTRIL EVERY DAY    folic acid (FOLVITE) 1 MG tablet Take 1 tablet (1 mg total)  by mouth once daily.    gabapentin (NEURONTIN) 300 MG capsule Take 1 capsule (300 mg total) by mouth 3 (three) times daily.    morphine (MS CONTIN) 30 MG 12 hr tablet Take 1 tablet (30 mg total) by mouth every 12 (twelve) hours. for 7 days    oxyCODONE (ROXICODONE) 15 MG Tab Take 1 tablet (15 mg total) by mouth every 4 (four) hours as needed for Pain.    promethazine (PHENERGAN) 25 MG tablet Take 1 tablet (25 mg total) by mouth every 6 (six) hours as needed for Nausea.    senna-docusate 8.6-50 mg (PERICOLACE) 8.6-50 mg per tablet Take 1 tablet by mouth daily as needed for Constipation.    tiZANidine (ZANAFLEX) 4 MG tablet Take 1 tablet (4 mg total) by mouth every 8 (eight) hours as needed.     Family History       Problem Relation (Age of Onset)    Diabetes Mother    Heart disease Mother, Father          Tobacco Use    Smoking status: Never    Smokeless tobacco: Never   Substance and Sexual Activity    Alcohol use: No    Drug use: Yes     Types: Marijuana     Comment: periodically     Sexual activity: Not Currently     Birth control/protection: See Surgical Hx     Review of Systems   Constitutional:  Negative for chills and fever.   HENT:  Negative for trouble swallowing.    Eyes:  Negative for photophobia and visual disturbance.   Respiratory:  Negative for cough and shortness of breath.    Cardiovascular:  Negative for chest pain, palpitations and leg swelling.   Gastrointestinal:  Positive for abdominal pain and nausea. Negative for blood in stool, constipation, diarrhea and vomiting.   Genitourinary:  Negative for dysuria and flank pain.   Musculoskeletal:  Positive for back pain. Negative for arthralgias and gait problem.   Skin:  Negative for rash and wound.   Neurological:  Negative for light-headedness and numbness.   Psychiatric/Behavioral:  Negative for agitation and confusion.      Objective:     Vital Signs (Most Recent):  Temp: 98.8 °F (37.1 °C) (10/27/24 2201)  Pulse: 94 (10/28/24 0400)  Resp: 16  (10/28/24 0418)  BP: (!) 166/91 (10/28/24 0400)  SpO2: 99 % (10/28/24 0400) Vital Signs (24h Range):  Temp:  [98.8 °F (37.1 °C)] 98.8 °F (37.1 °C)  Pulse:  [] 94  Resp:  [11-20] 16  SpO2:  [98 %-100 %] 99 %  BP: (138-192)/() 166/91     Weight: 94.3 kg (208 lb)  Body mass index is 38.04 kg/m².     Physical Exam  Vitals and nursing note reviewed.   Constitutional:       General: She is not in acute distress.  HENT:      Head: Normocephalic and atraumatic.      Nose: Nose normal.      Mouth/Throat:      Pharynx: No oropharyngeal exudate.   Eyes:      Extraocular Movements: Extraocular movements intact.      Conjunctiva/sclera: Conjunctivae normal.   Cardiovascular:      Rate and Rhythm: Normal rate and regular rhythm.      Heart sounds: Normal heart sounds.   Pulmonary:      Effort: Pulmonary effort is normal. No respiratory distress.      Breath sounds: Normal breath sounds.   Abdominal:      General: Bowel sounds are normal. There is no distension.      Palpations: Abdomen is soft.      Tenderness: There is abdominal tenderness in the right upper quadrant. There is no guarding or rebound.   Musculoskeletal:      Cervical back: Normal range of motion. Tenderness present. No edema or deformity. No pain with movement.      Thoracic back: Tenderness present. No edema or deformity.      Lumbar back: Tenderness present. No edema or deformity.   Skin:     General: Skin is warm and dry.      Comments: Scar noted to RLE   Neurological:      General: No focal deficit present.      Mental Status: She is alert and oriented to person, place, and time.   Psychiatric:         Mood and Affect: Mood normal.         Behavior: Behavior normal.                Significant Labs: All pertinent labs within the past 24 hours have been reviewed.  CBC:   Recent Labs   Lab 10/27/24  2325   WBC 5.30   HGB 10.3*   HCT 30.3*        CMP:   Recent Labs   Lab 10/27/24  2325      K 3.5      CO2 20*      BUN 11    CREATININE 1.1   CALCIUM 10.4   PROT 8.6*   ALBUMIN 4.1   BILITOT 0.7   ALKPHOS 81   AST 23   ALT 20   ANIONGAP 14     Cardiac Markers:   Recent Labs   Lab 10/27/24  2325   BNP 39     Lipase:   Recent Labs   Lab 10/27/24  2325   LIPASE 12     Magnesium:   Recent Labs   Lab 10/27/24  2325   MG 1.8       Significant Imaging: I have reviewed all pertinent imaging results/findings within the past 24 hours.  CT Abdomen Pelvis With IV Contrast NO Oral Contrast  Narrative: EXAMINATION:  CT ABDOMEN PELVIS WITH IV CONTRAST    CLINICAL HISTORY:  Abdominal pain, acute, nonlocalized;    TECHNIQUE:  Axial images of the abdomen and pelvis were acquired after the use of 100 cc [id number] IV contrast. No oral contrast was administered.  Coronal and sagittal reconstructions were also obtained    COMPARISON:  CT abdomen pelvis 10/21/2024, CTA chest 08/05/2024.    FINDINGS:  Heart: Mildly enlarged.  No pericardial effusion. No significant calcific coronary atherosclerosis.    Lungs: Stable 6 mm right lower lobe nodule (series 2, image 17).  Subsegmental and dependent atelectasis in the lung bases.  No focal consolidation, significant pleural fluid or pneumothorax.    Hepatobiliary: Liver is normal size.  Normal contour.  Ill-defined streaky hyperattenuation along the right hepatic lobe, similar to priors.  Subcentimeter right hepatic lobe hypodensity that is too small to adequately characterize.  No calcified gallstones. No pericholecystic fluid or gallbladder wall thickening.No intrahepatic or extrahepatic biliary ductal dilatation.    Pancreas: Peripancreatic inflammatory change/fat stranding.    Spleen: Atrophic with coarse peripheral calcifications likely secondary to patient's reported history of sickle cell disease.    Adrenals: Unremarkable.    Kidneys/Ureters: Normal in size and location. Normal enhancement. No hydronephrosis or nephrolithiasis. No ureteral dilatation.    Bladder: No evidence of wall thickening.    Reproductive  organs: Unremarkable.    Peritoneum: No free air or free fluid.    Retroperitoneum: No pathologically enlarged abdominopelvic lymph nodes.    Bowel/Mesentery: The stomach once again demonstrates slight fullness in the region of the gastric body which could relate to nondistention although correlation with EGD advised.  Small bowel is normal in caliber with no evidence of obstruction or inflammation. Colon demonstrates no focal wall thickening or obstruction. Appendix is visualized and unremarkable.    Abdominal wall:  Numerous soft tissue nodules in the subcutaneous soft tissues with scattered foci of gas which are favored to represent sequela of injections.    Vasculature: No aneurysm. No significant calcific atherosclerosis.  Portal vasculature is patent.    Bones: No acute fracture. Degenerative changes of the spine.  No suspicious osseous lesions.  Impression: Redemonstration of multiple soft tissue density nodules within the soft tissues of the anterior abdominal wall with increased number of foci of soft tissue gas on today's exam relative to prior exam of 10/21/2024.  This could reflect sequela of injections although correlation for soft tissue infection/cellulitis is advised.    Redemonstration of fullness in the region of the gastric body, nonspecific and possibly relating to nondistention although correlation with EGD is once again advised.    Stable 6 mm right lower lobe pulmonary nodule.  Consider follow-up chest CT in 6-12 months to assess for stability.    Additional findings as above.    Electronically signed by resident: Burton Hartley  Date:    10/28/2024  Time:    03:41    Electronically signed by: Beth Colvin MD  Date:    10/28/2024  Time:    05:01

## 2024-10-28 NOTE — ASSESSMENT & PLAN NOTE
Reports spasm type pain worst in the lower back radiating up the spine/paraspinal area to the the lower part of the neck for a few weeks now. Notes Zanaflex and heat help. No weakness/numbness/fever/trauma.   Likely sickle cell pain crisis vs muscle spasms vs other non specific back pain    - CT AP shows Degenerative changes of the spine. No suspicious osseous lesions  - Continue home MS contin 30 mg q12h, oxycodone 15 mg Q4h  - Continue home Zanaflex 4 mg Q8h  - Apply Heat  - PRN hydromorphone for breakthrough   - IV hydration if not tolerating PO, otherwise encourage PO hydration iso sickle cell pain

## 2024-10-28 NOTE — ASSESSMENT & PLAN NOTE
Patient here for suspected sickle cell pain crisis. Reports abd pain mostly RUQ cramping as well as epigastric pain. Nothing makes it better or worse (no a/w food, no improvement w home pain meds). RLQ TTP on physical exam, no rebound or guarding. No N/V/D. Lipase wnl. LFT wnl. CT A/P: w/ soft tissue nodules in abd wall likely 2/2 injections; nonspecific fullness of gastric body. Correlation with EGD recommended on imaging study. Denies NSAID use. No hematochezia, melena, hematemesis, hemoptysis.    - Will defer EGD as not clinically relevant at this time  - continue to monitor for worsening of symptoms  - Will follow-up in outpatient GI clinic

## 2024-10-28 NOTE — ASSESSMENT & PLAN NOTE
Patients blood pressure range in the last 24 hours was: BP  Min: 138/78  Max: 192/101.The patient's inpatient anti-hypertensive regimen is listed below:  Current Antihypertensives  amLODIPine tablet 10 mg, Daily, Oral  carvediloL tablet 25 mg, 2 times daily, Oral  Plan  - BP is controlled, no changes needed to their regimen

## 2024-10-28 NOTE — CARE UPDATE
Transitioned to PCA pump. Last Hospitalization, pt was seen by Hematology with the following recommendations. Will use these recs as a guideline for this hospitalization.     -Advise early and aggressive treatment of pain with IV opioids and PCA if necessary to achieve early control of pain. This strategy has been shown to decrease length of stay frequency of acute pain episodes.      -When tapering of opioids: perform during the day time, taper 10-20% at a time and decrease the dose instead of increasing interval between doses. Convert to PO when IV dose is roughly equal to home dose.     -The gold standard for the assessment of pain is the patient's report. There are no combination of physical findings or laboratory tests that can be used to determine whether sickle cell disease patients are in pain. Hemoglobin stability and absence of hemolysis should not be used to justify withholding or under-dosing pain medications.

## 2024-10-28 NOTE — PROVIDER PROGRESS NOTES - EMERGENCY DEPT.
Imaging Results              CT Abdomen Pelvis With IV Contrast NO Oral Contrast (In process)  Result time 10/28/24 04:13:08                     X-Ray Chest 1 View (Final result)  Result time 10/27/24 23:27:09      Final result by Beth Colvin MD (10/27/24 23:27:09)                   Impression:      Please see above.      Electronically signed by: Beth Colvin MD  Date:    10/27/2024  Time:    23:27               Narrative:    EXAMINATION:  XR CHEST 1 VIEW    CLINICAL HISTORY:  Chest pain, unspecified    TECHNIQUE:  Single frontal view of the chest was performed.    COMPARISON:  10/21/2024    FINDINGS:  There is a stably positioned right-sided chest port in place.  The cardiomediastinal silhouette is unchanged in size and configuration.  Mediastinal structures are midline.  The lungs are symmetrically expanded with diffuse coarse/increased interstitial attenuation which can be seen with interstitial edema.  No significant interval detrimental change in lung aeration or evidence of new or enlarging pneumothorax.  Thin linear opacity projects over the left lower lung zone suggestive of atelectasis and/or scarring.  Osseous structures are intact.                                     Patient was signed out pending re-evaluation for pain control and CT abdomen pelvis.  Preliminary read on CT is negative for acute abnormalities.  Patient was persistent pain requiring multiple doses of IV Dilaudid.  We will admit to Hospital Medicine for further pain control and setting of sickle cell pain crisis.

## 2024-10-28 NOTE — HPI
Nazanin Malone is a 44 y.o. 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 She complains of ongoing pain in her abdomen and paraspinal area. The pain in the abdomen is mostly in the RUQ and feels like a cramping sensation. Also epigastric area and diffusely throughout the abdomen to a lesser degree. Nothing makes this pain better or worse (such as eating, drinking, or her home morphine/oxycodone). The pain does not feeling like indigestion. She reports decreased PO intake 2/2 nausea though she is tolerating liquids. No longer having emesis. No diarrhea or constipation. No blood BM, no dysuria. She also reports pain that is most significant around her lumbar spine and radiates up to her cervical spine area. Feels like spasms. Zanaflex and heat do help that pain. Denies any positional component to her back pain. Denies radiation into her legs. No numbness.tingling. No injury, heavy lifting, trauma, or inciting event. She reports she had this pain last admission and it has not resolved. She feels it is related to sickle cell. She moved from [place] and has not established with a hematologist here yet, but does have an appointment scheduled for 11/20. Concerned she will run out of oral pain medications before then.    In the ED: Afebrile, HR 90-110s. Hypertensive. Otherwise HDS. Hbg 10.3. WBC normal. Lipase normal 12. LFTs normal. BNP and troponin normal. Cr stable 1.1. CXR without acute process. CT AP pending at time of admission.  Given 1L NS, multiple rounds of IV hydromorphone, potassium, magnesium, phenergan, Pepcid. Admitted to .

## 2024-10-28 NOTE — ASSESSMENT & PLAN NOTE
Complaining of a few weeks of generalized abdominal pain, worst in RUQ, feels like cramping. Associated with decreased PO tolerance, nausea. Does not feel like ingestion. Nothing makes it better/worse. No diarrhea, constipation, fever, dysuria, bloody BM. Unclear etiology of this pain. CT shows Re demonstration of multiple soft tissue density nodules likely sequela of injections as well as re demonstration of fullness in the region of the gastric body, nonspecific and possibly relating to nondistention although correlation with EGD is once again advised. Labs on admission show Lipase normal. WBC normal. LFTs normal.     - NPO for now  - GI consulted, appreciate recs  - PRN antiemetics (monitor Qtc)  - Home pain and bowel regimen

## 2024-10-28 NOTE — PLAN OF CARE
[facility]  Discharge Assessment    Primary Care Provider: Pee Montgomery MD     Discharge Assessment (most recent)       BRIEF DISCHARGE ASSESSMENT - 10/28/24 1053          Discharge Planning    Assessment Type Discharge Planning Brief Assessment     Resource/Environmental Concerns none     Support Systems Family members     Equipment Currently Used at Home none     Current Living Arrangements home     Patient/Family Anticipates Transition to home     Patient/Family Anticipated Services at Transition none     DME Needed Upon Discharge  none     Discharge Plan A Home     Discharge Plan B Home                   Pt is a 44 y.o. female admitted with SSA w/ pain and has a PMH of HTN and PE. She lives with family in a 2 story townhouse. She has no issues with climbing steps while not in crisis. She is independent with her ADLs and iADLs and drives. She will have transportation home/ [facility] Discharge Packet given to patient and/or family with understanding verbalized.   name and number and estimated discharge date written on white board in patient's room with request to call for any questions or concerns.  Will continue to follow for needs.  Discharge Plan A and Plan B have been determined by review of patient's clinical status, future medical and therapeutic needs, and coverage/benefits for post-acute care in coordination with multidisciplinary team members.  Keyur Heaton, RN,BSN

## 2024-10-28 NOTE — ED TRIAGE NOTES
Nazanin Malone, a 44 y.o. female presents to the ED w/ complaint of CP, abdominal pain, N/V, SOB. Takes Oxy at home with no relief. Recently hospitalized for similar symptoms.    Triage note:  Chief Complaint   Patient presents with    Sickle Cell Pain Crisis     Pt endorsing chest pain and abdominal pain. + shortness of breath. Home pain medications w/o relief.     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

## 2024-10-28 NOTE — HPI
Nazanin Malone is a 44 y.o. 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 She complains of ongoing pain in her abdomen and paraspinal area that has been occurring for the past few weeks. The pain in the abdomen is mostly in the RUQ and feels like a cramping sensation. Also epigastric area and diffusely throughout the abdomen to a lesser degree. Nothing makes this pain better or worse (such as eating, drinking, or her home morphine/oxycodone). She reports decreased PO intake 2/2 nausea though she is tolerating liquids. No longer having emesis. No diarrhea or constipation. No hematochezia, melena, hematemesis, hemoptysis.     In the ED: Afebrile, HR 90-110s. Hypertensive. Otherwise HDS. Hbg 10.3. WBC normal. Lipase normal 12. LFTs normal. BNP and troponin normal. CXR without acute process. Given 1L NS, multiple rounds of IV hydromorphone, potassium, magnesium, phenergan, Pepcid.  CT A/P with soft tissue nodules in abdominal wall likely 2/2 injections as well as nonspecific fullness of gastric body, correlation with EGD advised. GI consulted for further evaluation of abdominal pain.

## 2024-10-29 LAB
ALBUMIN SERPL BCP-MCNC: 3.4 G/DL (ref 3.5–5.2)
ALP SERPL-CCNC: 64 U/L (ref 40–150)
ALT SERPL W/O P-5'-P-CCNC: 22 U/L (ref 10–44)
ANION GAP SERPL CALC-SCNC: 8 MMOL/L (ref 8–16)
AST SERPL-CCNC: 37 U/L (ref 10–40)
BASOPHILS # BLD AUTO: 0.09 K/UL (ref 0–0.2)
BASOPHILS NFR BLD: 1.1 % (ref 0–1.9)
BILIRUB SERPL-MCNC: 0.5 MG/DL (ref 0.1–1)
BUN SERPL-MCNC: 14 MG/DL (ref 6–20)
CALCIUM SERPL-MCNC: 9.4 MG/DL (ref 8.7–10.5)
CHLORIDE SERPL-SCNC: 106 MMOL/L (ref 95–110)
CO2 SERPL-SCNC: 24 MMOL/L (ref 23–29)
CREAT SERPL-MCNC: 1.1 MG/DL (ref 0.5–1.4)
DIFFERENTIAL METHOD BLD: ABNORMAL
EOSINOPHIL # BLD AUTO: 0.3 K/UL (ref 0–0.5)
EOSINOPHIL NFR BLD: 4.1 % (ref 0–8)
ERYTHROCYTE [DISTWIDTH] IN BLOOD BY AUTOMATED COUNT: 19.1 % (ref 11.5–14.5)
EST. GFR  (NO RACE VARIABLE): >60 ML/MIN/1.73 M^2
GLUCOSE SERPL-MCNC: 77 MG/DL (ref 70–110)
HCT VFR BLD AUTO: 25.9 % (ref 37–48.5)
HGB BLD-MCNC: 8.7 G/DL (ref 12–16)
IMM GRANULOCYTES # BLD AUTO: 0.01 K/UL (ref 0–0.04)
IMM GRANULOCYTES NFR BLD AUTO: 0.1 % (ref 0–0.5)
LYMPHOCYTES # BLD AUTO: 4.9 K/UL (ref 1–4.8)
LYMPHOCYTES NFR BLD: 62.3 % (ref 18–48)
MAGNESIUM SERPL-MCNC: 2.1 MG/DL (ref 1.6–2.6)
MCH RBC QN AUTO: 23 PG (ref 27–31)
MCHC RBC AUTO-ENTMCNC: 33.6 G/DL (ref 32–36)
MCV RBC AUTO: 68 FL (ref 82–98)
MONOCYTES # BLD AUTO: 0.8 K/UL (ref 0.3–1)
MONOCYTES NFR BLD: 9.9 % (ref 4–15)
NEUTROPHILS # BLD AUTO: 1.8 K/UL (ref 1.8–7.7)
NEUTROPHILS NFR BLD: 22.5 % (ref 38–73)
NRBC BLD-RTO: 1 /100 WBC
OHS QRS DURATION: 98 MS
OHS QTC CALCULATION: 468 MS
PHOSPHATE SERPL-MCNC: 3.8 MG/DL (ref 2.7–4.5)
PLATELET # BLD AUTO: 260 K/UL (ref 150–450)
PMV BLD AUTO: 10.1 FL (ref 9.2–12.9)
POTASSIUM SERPL-SCNC: 3.8 MMOL/L (ref 3.5–5.1)
PROT SERPL-MCNC: 7 G/DL (ref 6–8.4)
RBC # BLD AUTO: 3.79 M/UL (ref 4–5.4)
SODIUM SERPL-SCNC: 138 MMOL/L (ref 136–145)
WBC # BLD AUTO: 7.87 K/UL (ref 3.9–12.7)

## 2024-10-29 PROCEDURE — 21400001 HC TELEMETRY ROOM

## 2024-10-29 PROCEDURE — 93010 ELECTROCARDIOGRAM REPORT: CPT | Mod: ,,, | Performed by: INTERNAL MEDICINE

## 2024-10-29 PROCEDURE — 99900035 HC TECH TIME PER 15 MIN (STAT)

## 2024-10-29 PROCEDURE — 93005 ELECTROCARDIOGRAM TRACING: CPT

## 2024-10-29 PROCEDURE — 84100 ASSAY OF PHOSPHORUS: CPT

## 2024-10-29 PROCEDURE — 63600175 PHARM REV CODE 636 W HCPCS: Performed by: FAMILY MEDICINE

## 2024-10-29 PROCEDURE — 94761 N-INVAS EAR/PLS OXIMETRY MLT: CPT

## 2024-10-29 PROCEDURE — 25000003 PHARM REV CODE 250

## 2024-10-29 PROCEDURE — 83735 ASSAY OF MAGNESIUM: CPT

## 2024-10-29 PROCEDURE — 25000003 PHARM REV CODE 250: Performed by: FAMILY MEDICINE

## 2024-10-29 PROCEDURE — 80053 COMPREHEN METABOLIC PANEL: CPT

## 2024-10-29 PROCEDURE — 85025 COMPLETE CBC W/AUTO DIFF WBC: CPT

## 2024-10-29 RX ORDER — SODIUM CHLORIDE 0.9 % (FLUSH) 0.9 %
20 SYRINGE (ML) INJECTION
Status: DISCONTINUED | OUTPATIENT
Start: 2024-10-29 | End: 2024-10-31 | Stop reason: HOSPADM

## 2024-10-29 RX ORDER — HEPARIN 100 UNIT/ML
500 SYRINGE INTRAVENOUS
Status: DISCONTINUED | OUTPATIENT
Start: 2024-10-29 | End: 2024-10-31 | Stop reason: HOSPADM

## 2024-10-29 RX ORDER — SODIUM CHLORIDE 0.9 % (FLUSH) 0.9 %
10 SYRINGE (ML) INJECTION
Status: DISCONTINUED | OUTPATIENT
Start: 2024-10-29 | End: 2024-10-31 | Stop reason: HOSPADM

## 2024-10-29 RX ADMIN — GABAPENTIN 600 MG: 300 CAPSULE ORAL at 09:10

## 2024-10-29 RX ADMIN — HYDROMORPHONE HYDROCHLORIDE: 10 INJECTION, SOLUTION INTRAMUSCULAR; INTRAVENOUS; SUBCUTANEOUS at 01:10

## 2024-10-29 RX ADMIN — TIZANIDINE 4 MG: 2 TABLET ORAL at 01:10

## 2024-10-29 RX ADMIN — DIPHENHYDRAMINE HYDROCHLORIDE 25 MG: 50 INJECTION, SOLUTION INTRAMUSCULAR; INTRAVENOUS at 06:10

## 2024-10-29 RX ADMIN — TIZANIDINE 4 MG: 2 TABLET ORAL at 05:10

## 2024-10-29 RX ADMIN — ENOXAPARIN SODIUM 100 MG: 60 INJECTION SUBCUTANEOUS at 09:10

## 2024-10-29 RX ADMIN — TIZANIDINE 4 MG: 2 TABLET ORAL at 09:10

## 2024-10-29 RX ADMIN — FOLIC ACID 1 MG: 1 TABLET ORAL at 09:10

## 2024-10-29 RX ADMIN — DIPHENHYDRAMINE HYDROCHLORIDE 25 MG: 50 INJECTION, SOLUTION INTRAMUSCULAR; INTRAVENOUS at 09:10

## 2024-10-29 RX ADMIN — HYDROMORPHONE HYDROCHLORIDE: 10 INJECTION, SOLUTION INTRAMUSCULAR; INTRAVENOUS; SUBCUTANEOUS at 11:10

## 2024-10-29 RX ADMIN — DIPHENHYDRAMINE HYDROCHLORIDE 25 MG: 50 INJECTION, SOLUTION INTRAMUSCULAR; INTRAVENOUS at 10:10

## 2024-10-29 RX ADMIN — GABAPENTIN 600 MG: 300 CAPSULE ORAL at 04:10

## 2024-10-29 RX ADMIN — FLUOXETINE HYDROCHLORIDE 40 MG: 20 CAPSULE ORAL at 09:10

## 2024-10-29 RX ADMIN — DIPHENHYDRAMINE HYDROCHLORIDE 25 MG: 50 INJECTION, SOLUTION INTRAMUSCULAR; INTRAVENOUS at 01:10

## 2024-10-29 RX ADMIN — DIPHENHYDRAMINE HYDROCHLORIDE 25 MG: 50 INJECTION, SOLUTION INTRAMUSCULAR; INTRAVENOUS at 03:10

## 2024-10-29 NOTE — RESPIRATORY THERAPY
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    24 Hours Vitals Range:  Temp:  [97.2 °F (36.2 °C)-99 °F (37.2 °C)]   Pulse:  [55-81]   Resp:  [12-18]   BP: ()/(50-86)   SpO2:  [92 %-98 %]     Labs:    Recent Labs     10/27/24  2325 10/29/24  0333    138   K 3.5 3.8    106   CO2 20* 24   BUN 11 14   CREATININE 1.1 1.1    77   PHOS  --  3.8   MG 1.8 2.1        No results for input(s): "PH", "PCO2", "PO2", "HCO3", "POCSATURATED", "BE" in the last 72 hours.    ASSESSMENT/INTERVENTIONS      Last VS   Temp: 97.2 °F (36.2 °C) (10/29 1130)  Pulse: 72 (10/29 1130)  Resp: 18 (10/29 1323)  BP: 131/86 (10/29 1130)  SpO2: 96 % (10/29 113)    Level of Consciousness: Level of Consciousness (AVPU): alert  Respiratory Effort: Respiratory Effort: Unlabored, Normal Expansion/Accessory Muscle Usage: Expansion/Accessory Muscles/Retractions: no use of accessory muscles, no retractions, expansion symmetric  All Lung Field Breath Sounds:    Is the ETCO2 monitor on? Yes  Is the patient wearing a cannula? Yes  Are ETCO2 orders placed? Yes  Is the patient on a PCA pump? Yes  ETCO2 monitored: ETCO2 (mmHg): 44 mmHg  Ambu at bedside:      Active Orders   Respiratory Care    END TIDAL CO2 MONITOR Q12H     Frequency: Q12H     Number of Occurrences: Until Specified    Incentive " spirometry     Frequency: Q4H WAKE     Number of Occurrences: Until Specified    Inhalation Treatment Q4H PRN     Frequency: Q4H PRN     Number of Occurrences: Until Specified    Oxygen Continuous     Frequency: Continuous     Number of Occurrences: Until Specified     Order Questions:      Device type: Low flow      Device: Nasal Cannula (1- 5 Liters)      LPM: 1      Titrate O2 per Oxygen Titration Protocol: Yes      To maintain SpO2 goal of: >= 90%      Notify MD of: Inability to achieve desired SpO2; Sudden change in patient status and requires 20% increase in FiO2; Patient requires >60% FiO2    Oxygen PRN     Frequency: PRN     Number of Occurrences: Until Specified     Order Questions:      Device type: Low flow      Device: Nasal Cannula (1- 5 Liters)      LPM: 1-5      Titrate O2 per Oxygen Titration Protocol: Yes      To maintain SpO2 goal of: >= 90%      Notify MD of: Inability to achieve desired SpO2; Sudden change in patient status and requires 20% increase in FiO2; Patient requires >60% FiO2    Pulse Oximetry Q Shift     Frequency: Q Shift     Number of Occurrences: Until Specified       RECOMMENDATIONS    We recommend: RRT Recs: Continue POC per primary team.      FOLLOW-UP    Please call back the Rapid Response RT, Myles Anthony RRT at x [phone] for any questions or concerns.

## 2024-10-29 NOTE — ASSESSMENT & PLAN NOTE
Nausea/vomiting    CT with area fullness and gastric body, recommend EGD.  Gastroenterology consulted, plan on outpatient follow up.  Lipase normal.  WBC Normal.   Symptomatic treatment.  Resolved with the pain management, possibly related to vaso-occlusive episode.  PRN antiemetics (QTC prolonged on admission, resolved)

## 2024-10-29 NOTE — SUBJECTIVE & OBJECTIVE
Subjective:  Pt says overall sickle cell pain has improved with PCA pump.  Abdominal pain, nausea, vomiting has resolved.    High Risk Conditions: Patient is currently receiving parenteral controlled substances: Dilaudid       Objective:     Vital signs reviewed        Physical Exam  Constitutional:       Appearance: She is not toxic-appearing.   HENT:      Mouth/Throat:      Mouth: Mucous membranes are moist.   Pulmonary:      Effort: Pulmonary effort is normal. No respiratory distress.   Abdominal:      General: There is no distension.   Musculoskeletal:         General: No deformity. Normal range of motion.   Skin:     General: Skin is dry.   Neurological:      General: No focal deficit present.      Mental Status: She is alert.      Cranial Nerves: No cranial nerve deficit.   Psychiatric:         Mood and Affect: Mood normal.         Behavior: Behavior normal.       Significant Labs: All pertinent labs within the past 24 hours have been reviewed.    Significant Imaging: I have reviewed all pertinent imaging results/findings within the past 24 hours.

## 2024-10-29 NOTE — ASSESSMENT & PLAN NOTE
- unable to keep up with oral fluid intake on admission.  s/p IV fluids  - type multimodal pain regimen including PCA pump with improved symptoms.  - supplemental O2 with PCA.  Capitalize incentive spirometer to reduce incidence of acute chest syndrome  - continue VTE regimen  - cont folic acid daily   - Heme/onc appointment on 11/21/24

## 2024-10-29 NOTE — HOSPITAL COURSE
Admitted to hospital medicine with sickle cell vaso-occlusive pain episode.  Started multimodal pain management including PCA pump, O2, fluids, antiemetics.  IV fluid shoulders noted, however, d/t abdominal pain, nausea, vomiting, pt unable to keep up with oral intake so IV fluids were initiated on day of admission.  Pain overall improved, plan to wean PCA pump.  CT AP revealed area of fullness in the region of the gastric body.  Gastroenterology consulted and recommended outpatient follow up.

## 2024-10-29 NOTE — ASSESSMENT & PLAN NOTE
- Likely sickle cell pain crisis vs muscle spasms vs other non specific back pain  - CT AP shows Degenerative changes of the spine. No suspicious osseous lesions  - cont multimodal pain management including PCA pump, muscle relaxers, heat  - s/p IV hydration, encourage PO fluid intake

## 2024-10-30 LAB
ALBUMIN SERPL BCP-MCNC: 3.2 G/DL (ref 3.5–5.2)
ALP SERPL-CCNC: 63 U/L (ref 40–150)
ALT SERPL W/O P-5'-P-CCNC: 25 U/L (ref 10–44)
ANION GAP SERPL CALC-SCNC: 8 MMOL/L (ref 8–16)
AST SERPL-CCNC: 33 U/L (ref 10–40)
BASOPHILS # BLD AUTO: 0.07 K/UL (ref 0–0.2)
BASOPHILS NFR BLD: 1 % (ref 0–1.9)
BILIRUB SERPL-MCNC: 0.4 MG/DL (ref 0.1–1)
BUN SERPL-MCNC: 13 MG/DL (ref 6–20)
CALCIUM SERPL-MCNC: 8.9 MG/DL (ref 8.7–10.5)
CHLORIDE SERPL-SCNC: 108 MMOL/L (ref 95–110)
CO2 SERPL-SCNC: 24 MMOL/L (ref 23–29)
CREAT SERPL-MCNC: 1 MG/DL (ref 0.5–1.4)
DIFFERENTIAL METHOD BLD: ABNORMAL
EOSINOPHIL # BLD AUTO: 0.3 K/UL (ref 0–0.5)
EOSINOPHIL NFR BLD: 4.2 % (ref 0–8)
ERYTHROCYTE [DISTWIDTH] IN BLOOD BY AUTOMATED COUNT: 18.7 % (ref 11.5–14.5)
EST. GFR  (NO RACE VARIABLE): >60 ML/MIN/1.73 M^2
GLUCOSE SERPL-MCNC: 67 MG/DL (ref 70–110)
HCT VFR BLD AUTO: 23.2 % (ref 37–48.5)
HGB BLD-MCNC: 7.9 G/DL (ref 12–16)
IMM GRANULOCYTES # BLD AUTO: 0.01 K/UL (ref 0–0.04)
IMM GRANULOCYTES NFR BLD AUTO: 0.1 % (ref 0–0.5)
LYMPHOCYTES # BLD AUTO: 4.3 K/UL (ref 1–4.8)
LYMPHOCYTES NFR BLD: 62 % (ref 18–48)
MAGNESIUM SERPL-MCNC: 2 MG/DL (ref 1.6–2.6)
MCH RBC QN AUTO: 23.3 PG (ref 27–31)
MCHC RBC AUTO-ENTMCNC: 34.1 G/DL (ref 32–36)
MCV RBC AUTO: 68 FL (ref 82–98)
MONOCYTES # BLD AUTO: 0.9 K/UL (ref 0.3–1)
MONOCYTES NFR BLD: 13.3 % (ref 4–15)
NEUTROPHILS # BLD AUTO: 1.3 K/UL (ref 1.8–7.7)
NEUTROPHILS NFR BLD: 19.4 % (ref 38–73)
NRBC BLD-RTO: 2 /100 WBC
OHS QRS DURATION: 98 MS
OHS QTC CALCULATION: 484 MS
PHOSPHATE SERPL-MCNC: 4.6 MG/DL (ref 2.7–4.5)
PLATELET # BLD AUTO: 242 K/UL (ref 150–450)
PMV BLD AUTO: 9.6 FL (ref 9.2–12.9)
POTASSIUM SERPL-SCNC: 4.1 MMOL/L (ref 3.5–5.1)
PROT SERPL-MCNC: 6.6 G/DL (ref 6–8.4)
RBC # BLD AUTO: 3.39 M/UL (ref 4–5.4)
SODIUM SERPL-SCNC: 140 MMOL/L (ref 136–145)
WBC # BLD AUTO: 6.92 K/UL (ref 3.9–12.7)

## 2024-10-30 PROCEDURE — 94761 N-INVAS EAR/PLS OXIMETRY MLT: CPT

## 2024-10-30 PROCEDURE — 63600175 PHARM REV CODE 636 W HCPCS: Performed by: FAMILY MEDICINE

## 2024-10-30 PROCEDURE — 85025 COMPLETE CBC W/AUTO DIFF WBC: CPT

## 2024-10-30 PROCEDURE — 83735 ASSAY OF MAGNESIUM: CPT

## 2024-10-30 PROCEDURE — 93005 ELECTROCARDIOGRAM TRACING: CPT

## 2024-10-30 PROCEDURE — 80053 COMPREHEN METABOLIC PANEL: CPT

## 2024-10-30 PROCEDURE — 21400001 HC TELEMETRY ROOM

## 2024-10-30 PROCEDURE — 25000003 PHARM REV CODE 250

## 2024-10-30 PROCEDURE — 99900035 HC TECH TIME PER 15 MIN (STAT)

## 2024-10-30 PROCEDURE — 36415 COLL VENOUS BLD VENIPUNCTURE: CPT

## 2024-10-30 PROCEDURE — 84100 ASSAY OF PHOSPHORUS: CPT

## 2024-10-30 PROCEDURE — 25000003 PHARM REV CODE 250: Performed by: FAMILY MEDICINE

## 2024-10-30 RX ORDER — TIZANIDINE 2 MG/1
4 TABLET ORAL EVERY 8 HOURS
Status: DISCONTINUED | OUTPATIENT
Start: 2024-10-30 | End: 2024-10-31 | Stop reason: HOSPADM

## 2024-10-30 RX ADMIN — FLUOXETINE HYDROCHLORIDE 40 MG: 20 CAPSULE ORAL at 08:10

## 2024-10-30 RX ADMIN — DIPHENHYDRAMINE HYDROCHLORIDE 25 MG: 50 INJECTION, SOLUTION INTRAMUSCULAR; INTRAVENOUS at 12:10

## 2024-10-30 RX ADMIN — DIPHENHYDRAMINE HYDROCHLORIDE 25 MG: 50 INJECTION, SOLUTION INTRAMUSCULAR; INTRAVENOUS at 03:10

## 2024-10-30 RX ADMIN — GABAPENTIN 600 MG: 300 CAPSULE ORAL at 02:10

## 2024-10-30 RX ADMIN — TIZANIDINE 4 MG: 2 TABLET ORAL at 08:10

## 2024-10-30 RX ADMIN — DIPHENHYDRAMINE HYDROCHLORIDE 25 MG: 50 INJECTION, SOLUTION INTRAMUSCULAR; INTRAVENOUS at 07:10

## 2024-10-30 RX ADMIN — HYDROMORPHONE HYDROCHLORIDE: 10 INJECTION, SOLUTION INTRAMUSCULAR; INTRAVENOUS; SUBCUTANEOUS at 10:10

## 2024-10-30 RX ADMIN — HYDROMORPHONE HYDROCHLORIDE: 10 INJECTION, SOLUTION INTRAMUSCULAR; INTRAVENOUS; SUBCUTANEOUS at 08:10

## 2024-10-30 RX ADMIN — GABAPENTIN 600 MG: 300 CAPSULE ORAL at 08:10

## 2024-10-30 RX ADMIN — FOLIC ACID 1 MG: 1 TABLET ORAL at 08:10

## 2024-10-30 RX ADMIN — TIZANIDINE 4 MG: 2 TABLET ORAL at 02:10

## 2024-10-30 RX ADMIN — TIZANIDINE 4 MG: 2 TABLET ORAL at 05:10

## 2024-10-30 RX ADMIN — DIPHENHYDRAMINE HYDROCHLORIDE 25 MG: 50 INJECTION, SOLUTION INTRAMUSCULAR; INTRAVENOUS at 09:10

## 2024-10-30 RX ADMIN — ENOXAPARIN SODIUM 100 MG: 60 INJECTION SUBCUTANEOUS at 08:10

## 2024-10-30 RX ADMIN — ALPRAZOLAM 0.25 MG: 0.25 TABLET ORAL at 07:10

## 2024-10-30 RX ADMIN — ENOXAPARIN SODIUM 100 MG: 60 INJECTION SUBCUTANEOUS at 09:10

## 2024-10-30 RX ADMIN — DIPHENHYDRAMINE HYDROCHLORIDE 25 MG: 50 INJECTION, SOLUTION INTRAMUSCULAR; INTRAVENOUS at 05:10

## 2024-10-30 NOTE — RESPIRATORY THERAPY
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    24 Hours Vitals Range:  Temp:  [97.2 °F (36.2 °C)-98.8 °F (37.1 °C)]   Pulse:  [61-80]   Resp:  [15-20]   BP: (102-153)/(68-91)   SpO2:  [95 %-99 %]     Labs:    Recent Labs     10/27/24  2325 10/29/24  0333 10/30/24  0322    138 140   K 3.5 3.8 4.1    106 108   CO2 20* 24 24   BUN 11 14 13   CREATININE 1.1 1.1 1.0    77 67*   PHOS  --  3.8 4.6*   MG 1.8 2.1 2.0        No results for input(s): "PH", "PCO2", "PO2", "HCO3", "POCSATURATED", "BE" in the last 72 hours.    ASSESSMENT/INTERVENTIONS      Last VS   Temp: 97.2 °F (36.2 °C) (10/30 0804)  Pulse: 80 (10/30 0900)  Resp: 16 (10/30 1053)  BP: 112/79 (10/30 08)  SpO2: 98 % (10/30 0900)    Level of Consciousness: Level of Consciousness (AVPU): alert  Respiratory Effort: Respiratory Effort: Normal, Unlabored Expansion/Accessory Muscle Usage: Expansion/Accessory Muscles/Retractions: expansion symmetric, no retractions, no use of accessory muscles  All Lung Field Breath Sounds: All Lung Fields Breath Sounds: Anterior:, Posterior:, clear, diminished  Is the ETCO2 monitor on? Yes  Is the patient wearing a cannula? Yes  Are ETCO2 orders placed? Yes  Is the patient on a PCA pump? Yes  ETCO2 monitored: ETCO2 (mmHg): 47 mmHg  Ambu at bedside:  "     Active Orders   Respiratory Care    END TIDAL CO2 MONITOR Q12H     Frequency: Q12H     Number of Occurrences: Until Specified    Incentive spirometry     Frequency: Q4H WAKE     Number of Occurrences: Until Specified    Inhalation Treatment Q4H PRN     Frequency: Q4H PRN     Number of Occurrences: Until Specified    Oxygen Continuous     Frequency: Continuous     Number of Occurrences: Until Specified     Order Questions:      Device type: Low flow      Device: Nasal Cannula (1- 5 Liters)      LPM: 1      Titrate O2 per Oxygen Titration Protocol: Yes      To maintain SpO2 goal of: >= 90%      Notify MD of: Inability to achieve desired SpO2; Sudden change in patient status and requires 20% increase in FiO2; Patient requires >60% FiO2    Oxygen PRN     Frequency: PRN     Number of Occurrences: Until Specified     Order Questions:      Device type: Low flow      Device: Nasal Cannula (1- 5 Liters)      LPM: 1-5      Titrate O2 per Oxygen Titration Protocol: Yes      To maintain SpO2 goal of: >= 90%      Notify MD of: Inability to achieve desired SpO2; Sudden change in patient status and requires 20% increase in FiO2; Patient requires >60% FiO2    Pulse Oximetry Q Shift     Frequency: Q Shift     Number of Occurrences: Until Specified       RECOMMENDATIONS    We recommend: RRT Recs: Continue POC per primary team.      FOLLOW-UP    Please call back the Rapid Response RTClaudia RRT at x [phone] for any questions or concerns.

## 2024-10-30 NOTE — PLAN OF CARE
Problem: Adult Inpatient Plan of Care  Goal: Plan of Care Review  Outcome: Progressing  Goal: Patient-Specific Goal (Individualized)  Outcome: Progressing  Goal: Absence of Hospital-Acquired Illness or Injury  Outcome: Progressing  Goal: Optimal Comfort and Wellbeing  Outcome: Progressing     Problem: Pain Acute  Goal: Optimal Pain Control and Function  Outcome: Progressing

## 2024-10-30 NOTE — ASSESSMENT & PLAN NOTE
Improved on repeat EKG  Okay for antiemetics  Avoid other QT prolonging agents  Daily EKGs and telemetry

## 2024-10-30 NOTE — PROGRESS NOTES
[facility] [address] [place] [address] Medicine  Progress Note    Patient Name: Nazanin Malone  MRN: [id number]  Patient Class: IP- Inpatient   Admission Date: 10/27/2024  Length of Stay: 1 days  Attending Physician: Chase Gasca III*  Primary Care Provider: Pee Montgomery MD        Subjective:     Principal Problem:Vaso-occlusive pain due to sickle cell disease        HPI:  Nazanin Malone is a 44 y.o. 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 She complains of ongoing pain in her abdomen and paraspinal area. The pain in the abdomen is mostly in the RUQ and feels like a cramping sensation. Also epigastric area and diffusely throughout the abdomen to a lesser degree. Nothing makes this pain better or worse (such as eating, drinking, or her home morphine/oxycodone). The pain does not feeling like indigestion. She reports decreased PO intake 2/2 nausea though she is tolerating liquids. No longer having emesis. No diarrhea or constipation. No blood BM, no dysuria. She also reports pain that is most significant around her lumbar spine and radiates up to her cervical spine area. Feels like spasms. Zanaflex and heat do help that pain. Denies any positional component to her back pain. Denies radiation into her legs. No numbness.tingling. No injury, heavy lifting, trauma, or inciting event. She reports she had this pain last admission and it has not resolved. She feels it is related to sickle cell. She moved from [place] and has not established with a hematologist here yet, but does have an appointment scheduled for 11/20. Concerned she will run out of oral pain medications before then.    In the ED: Afebrile, HR 90-110s. Hypertensive. Otherwise HDS. Hbg 10.3. WBC normal. Lipase normal 12. LFTs normal. BNP and troponin normal. Cr stable 1.1. CXR without acute process. CT AP pending at time of admission.  Given 1L  NS, multiple rounds of IV hydromorphone, potassium, magnesium, phenergan, Pepcid. Admitted to .    Overview/Hospital Course:  Admitted to hospital medicine with sickle cell vaso-occlusive pain episode.  Started multimodal pain management including PCA pump, O2, fluids, antiemetics.  IV fluid shoulders noted, however, d/t abdominal pain, nausea, vomiting, pt unable to keep up with oral intake so IV fluids were initiated.  Overall pain significantly improved.  CT AP revealed area of fullness in the region of the gastric body.  Gastroenterology consulted and recommended outpatient follow up.    Subjective:  Pt says overall sickle cell pain has improved with PCA pump.  Abdominal pain, nausea, vomiting has resolved.    High Risk Conditions: Patient is currently receiving parenteral controlled substances: Dilaudid       Objective:     Vital signs reviewed        Physical Exam  Constitutional:       Appearance: She is not toxic-appearing.   HENT:      Mouth/Throat:      Mouth: Mucous membranes are moist.   Pulmonary:      Effort: Pulmonary effort is normal. No respiratory distress.   Abdominal:      General: There is no distension.   Musculoskeletal:         General: No deformity. Normal range of motion.   Skin:     General: Skin is dry.   Neurological:      General: No focal deficit present.      Mental Status: She is alert.      Cranial Nerves: No cranial nerve deficit.   Psychiatric:         Mood and Affect: Mood normal.         Behavior: Behavior normal.       Significant Labs: All pertinent labs within the past 24 hours have been reviewed.    Significant Imaging: I have reviewed all pertinent imaging results/findings within the past 24 hours.    Assessment/Plan:      * Vaso-occlusive pain due to sickle cell disease  - unable to keep up with oral fluid intake on admission.  s/p IV fluids  - type multimodal pain regimen including PCA pump with improved symptoms.  - supplemental O2 with PCA.  Capitalize incentive  spirometer to reduce incidence of acute chest syndrome  - continue VTE regimen  - cont folic acid daily   - Heme/onc appointment on 11/21/24    Midline low back pain without sciatica  - Likely sickle cell pain crisis vs muscle spasms vs other non specific back pain  - CT AP shows Degenerative changes of the spine. No suspicious osseous lesions  - cont multimodal pain management including PCA pump, muscle relaxers, heat  - s/p IV hydration, encourage PO fluid intake    Right upper quadrant abdominal pain  Nausea/vomiting    CT with area fullness and gastric body, recommend EGD.  Gastroenterology consulted, plan on outpatient follow up.  Lipase normal.  WBC Normal.   Symptomatic treatment.  Resolved with the pain management, possibly related to vaso-occlusive episode.  PRN antiemetics (QTC prolonged on admission, resolved)    Thrombosis of internal carotid, left  - continue lovenox injections BID     QT prolongation  Improved on repeat EKG  Okay for antiemetics  Avoid other QT prolonging agents  Daily EKGs and telemetry    History of pulmonary embolism  - continue lovenox injections BID     Trigeminal neuralgia  - Noted. Continue home gabapentin     Hypertension  BP low normal, holding home amlodipine and carvedilol  Resume when tolerating    Recommended overall sickle cell vaso-occlusive episode management:    -Early and aggressive treatment of pain with IV opioids and PCA if necessary to achieve early control of pain. This strategy has been shown to decrease length of stay frequency of acute pain episodes.      -When tapering of opioids: perform during the day time, taper 10-20% at a time and decrease the dose instead of increasing interval between doses. Convert to PO when IV dose is roughly equal to home dose.     -The gold standard for the assessment of pain is the patient's report. There are no combination of physical findings or laboratory tests that can be used to determine whether sickle cell disease patients  are in pain. Hemoglobin stability and absence of hemolysis should not be used to justify withholding or under-dosing pain medications.     VTE Risk Mitigation (From admission, onward)           Ordered     heparin, porcine (PF) 100 unit/mL injection flush 500 Units  As needed (PRN)         10/29/24 0327     enoxaparin injection 100 mg  Every 12 hours         10/28/24 0948     IP VTE LOW RISK PATIENT  Once         10/28/24 0432     Place sequential compression device  Until discontinued         10/28/24 0432                    Discharge Planning   ALYSE: 10/30/2024     Code Status: Full Code   Is the patient medically ready for discharge?:     Reason for patient still in hospital (select all that apply): Patient trending condition, Treatment, and Pending disposition  Discharge Plan A: Home                  Chase Gasca III, MD  Department of Hospital Medicine   [facility] - Observation 11H

## 2024-10-31 ENCOUNTER — TELEPHONE (OUTPATIENT)
Dept: HEMATOLOGY/ONCOLOGY | Facility: CLINIC | Age: 44
End: 2024-10-31
Payer: MEDICARE

## 2024-10-31 VITALS
RESPIRATION RATE: 1 BRPM | HEART RATE: 82 BPM | TEMPERATURE: 99 F | HEIGHT: 62 IN | WEIGHT: 211 LBS | SYSTOLIC BLOOD PRESSURE: 135 MMHG | OXYGEN SATURATION: 100 % | BODY MASS INDEX: 38.83 KG/M2 | DIASTOLIC BLOOD PRESSURE: 84 MMHG

## 2024-10-31 LAB
ALBUMIN SERPL BCP-MCNC: 3.3 G/DL (ref 3.5–5.2)
ALP SERPL-CCNC: 67 U/L (ref 40–150)
ALT SERPL W/O P-5'-P-CCNC: 20 U/L (ref 10–44)
ANION GAP SERPL CALC-SCNC: 8 MMOL/L (ref 8–16)
AST SERPL-CCNC: 26 U/L (ref 10–40)
BASOPHILS # BLD AUTO: 0.04 K/UL (ref 0–0.2)
BASOPHILS NFR BLD: 0.7 % (ref 0–1.9)
BILIRUB SERPL-MCNC: 0.4 MG/DL (ref 0.1–1)
BUN SERPL-MCNC: 13 MG/DL (ref 6–20)
CALCIUM SERPL-MCNC: 9 MG/DL (ref 8.7–10.5)
CHLORIDE SERPL-SCNC: 106 MMOL/L (ref 95–110)
CO2 SERPL-SCNC: 25 MMOL/L (ref 23–29)
CREAT SERPL-MCNC: 0.9 MG/DL (ref 0.5–1.4)
DIFFERENTIAL METHOD BLD: ABNORMAL
EOSINOPHIL # BLD AUTO: 0.3 K/UL (ref 0–0.5)
EOSINOPHIL NFR BLD: 5.1 % (ref 0–8)
ERYTHROCYTE [DISTWIDTH] IN BLOOD BY AUTOMATED COUNT: 19 % (ref 11.5–14.5)
EST. GFR  (NO RACE VARIABLE): >60 ML/MIN/1.73 M^2
GLUCOSE SERPL-MCNC: 80 MG/DL (ref 70–110)
HCT VFR BLD AUTO: 23.4 % (ref 37–48.5)
HGB BLD-MCNC: 7.9 G/DL (ref 12–16)
IMM GRANULOCYTES # BLD AUTO: 0.01 K/UL (ref 0–0.04)
IMM GRANULOCYTES NFR BLD AUTO: 0.2 % (ref 0–0.5)
LYMPHOCYTES # BLD AUTO: 3.1 K/UL (ref 1–4.8)
LYMPHOCYTES NFR BLD: 53.8 % (ref 18–48)
MAGNESIUM SERPL-MCNC: 2 MG/DL (ref 1.6–2.6)
MCH RBC QN AUTO: 23.3 PG (ref 27–31)
MCHC RBC AUTO-ENTMCNC: 33.8 G/DL (ref 32–36)
MCV RBC AUTO: 69 FL (ref 82–98)
MONOCYTES # BLD AUTO: 0.8 K/UL (ref 0.3–1)
MONOCYTES NFR BLD: 14.5 % (ref 4–15)
NEUTROPHILS # BLD AUTO: 1.5 K/UL (ref 1.8–7.7)
NEUTROPHILS NFR BLD: 25.7 % (ref 38–73)
NRBC BLD-RTO: 2 /100 WBC
OHS QRS DURATION: 100 MS
OHS QTC CALCULATION: 468 MS
PHOSPHATE SERPL-MCNC: 4 MG/DL (ref 2.7–4.5)
PLATELET # BLD AUTO: 270 K/UL (ref 150–450)
PMV BLD AUTO: 9.7 FL (ref 9.2–12.9)
POTASSIUM SERPL-SCNC: 4.1 MMOL/L (ref 3.5–5.1)
PROT SERPL-MCNC: 6.8 G/DL (ref 6–8.4)
RBC # BLD AUTO: 3.39 M/UL (ref 4–5.4)
SODIUM SERPL-SCNC: 139 MMOL/L (ref 136–145)
WBC # BLD AUTO: 5.74 K/UL (ref 3.9–12.7)

## 2024-10-31 PROCEDURE — 94761 N-INVAS EAR/PLS OXIMETRY MLT: CPT

## 2024-10-31 PROCEDURE — 63600175 PHARM REV CODE 636 W HCPCS: Performed by: FAMILY MEDICINE

## 2024-10-31 PROCEDURE — 99900035 HC TECH TIME PER 15 MIN (STAT)

## 2024-10-31 PROCEDURE — 25000003 PHARM REV CODE 250

## 2024-10-31 PROCEDURE — 84100 ASSAY OF PHOSPHORUS: CPT

## 2024-10-31 PROCEDURE — 25000003 PHARM REV CODE 250: Performed by: FAMILY MEDICINE

## 2024-10-31 PROCEDURE — 83735 ASSAY OF MAGNESIUM: CPT

## 2024-10-31 PROCEDURE — 85025 COMPLETE CBC W/AUTO DIFF WBC: CPT

## 2024-10-31 PROCEDURE — 93005 ELECTROCARDIOGRAM TRACING: CPT

## 2024-10-31 PROCEDURE — 63600175 PHARM REV CODE 636 W HCPCS

## 2024-10-31 PROCEDURE — 80053 COMPREHEN METABOLIC PANEL: CPT

## 2024-10-31 RX ORDER — NALOXONE HYDROCHLORIDE 4 MG/.1ML
1 SPRAY NASAL ONCE
Qty: 2 EACH | Refills: 4 | Status: SHIPPED | OUTPATIENT
Start: 2024-10-31 | End: 2024-10-31

## 2024-10-31 RX ORDER — ENOXAPARIN SODIUM 100 MG/ML
100 INJECTION SUBCUTANEOUS EVERY 12 HOURS
Qty: 60 ML | Refills: 2 | Status: SHIPPED | OUTPATIENT
Start: 2024-10-31 | End: 2025-01-29

## 2024-10-31 RX ORDER — TIZANIDINE 4 MG/1
4 TABLET ORAL EVERY 8 HOURS
Qty: 30 TABLET | Refills: 0 | Status: SHIPPED | OUTPATIENT
Start: 2024-10-31 | End: 2024-11-10

## 2024-10-31 RX ORDER — FOLIC ACID 1 MG/1
1 TABLET ORAL DAILY
Qty: 60 TABLET | Refills: 0 | Status: SHIPPED | OUTPATIENT
Start: 2024-10-31 | End: 2024-12-30

## 2024-10-31 RX ORDER — ENOXAPARIN SODIUM 100 MG/ML
1 INJECTION SUBCUTANEOUS EVERY 12 HOURS
Qty: 60 ML | Refills: 0 | Status: SHIPPED | OUTPATIENT
Start: 2024-10-31 | End: 2024-10-31 | Stop reason: HOSPADM

## 2024-10-31 RX ORDER — MORPHINE SULFATE 30 MG/1
30 TABLET, FILM COATED, EXTENDED RELEASE ORAL EVERY 12 HOURS
Qty: 42 TABLET | Refills: 0 | Status: SHIPPED | OUTPATIENT
Start: 2024-10-31 | End: 2024-11-21

## 2024-10-31 RX ORDER — PROMETHAZINE HYDROCHLORIDE 12.5 MG/1
12.5 TABLET ORAL EVERY 6 HOURS PRN
Qty: 28 TABLET | Refills: 0 | Status: SHIPPED | OUTPATIENT
Start: 2024-10-31 | End: 2024-11-07

## 2024-10-31 RX ORDER — FLUOXETINE HYDROCHLORIDE 40 MG/1
40 CAPSULE ORAL DAILY
Qty: 90 CAPSULE | Refills: 1 | Status: SHIPPED | OUTPATIENT
Start: 2024-10-31

## 2024-10-31 RX ORDER — OXYCODONE HYDROCHLORIDE 15 MG/1
15 TABLET ORAL EVERY 4 HOURS PRN
Qty: 60 TABLET | Refills: 0 | Status: SHIPPED | OUTPATIENT
Start: 2024-10-31 | End: 2024-11-10

## 2024-10-31 RX ORDER — SCOLOPAMINE TRANSDERMAL SYSTEM 1 MG/1
1 PATCH, EXTENDED RELEASE TRANSDERMAL
Qty: 10 PATCH | Refills: 0 | Status: SHIPPED | OUTPATIENT
Start: 2024-10-31 | End: 2024-11-30

## 2024-10-31 RX ORDER — POLYETHYLENE GLYCOL 3350 17 G/17G
17 POWDER, FOR SOLUTION ORAL DAILY
Qty: 510 G | Refills: 0 | Status: SHIPPED | OUTPATIENT
Start: 2024-11-01 | End: 2024-12-01

## 2024-10-31 RX ORDER — GABAPENTIN 300 MG/1
600 CAPSULE ORAL 3 TIMES DAILY
Qty: 180 CAPSULE | Refills: 11 | Status: SHIPPED | OUTPATIENT
Start: 2024-10-31 | End: 2025-10-31

## 2024-10-31 RX ADMIN — FOLIC ACID 1 MG: 1 TABLET ORAL at 09:10

## 2024-10-31 RX ADMIN — FLUOXETINE HYDROCHLORIDE 40 MG: 20 CAPSULE ORAL at 09:10

## 2024-10-31 RX ADMIN — HYDROMORPHONE HYDROCHLORIDE: 10 INJECTION, SOLUTION INTRAMUSCULAR; INTRAVENOUS; SUBCUTANEOUS at 08:10

## 2024-10-31 RX ADMIN — DIPHENHYDRAMINE HYDROCHLORIDE 25 MG: 50 INJECTION, SOLUTION INTRAMUSCULAR; INTRAVENOUS at 11:10

## 2024-10-31 RX ADMIN — HEPARIN 500 UNITS: 100 SYRINGE at 04:10

## 2024-10-31 RX ADMIN — TIZANIDINE 4 MG: 2 TABLET ORAL at 06:10

## 2024-10-31 RX ADMIN — ENOXAPARIN SODIUM 100 MG: 60 INJECTION SUBCUTANEOUS at 09:10

## 2024-10-31 RX ADMIN — DIPHENHYDRAMINE HYDROCHLORIDE 25 MG: 50 INJECTION, SOLUTION INTRAMUSCULAR; INTRAVENOUS at 03:10

## 2024-10-31 RX ADMIN — DIPHENHYDRAMINE HYDROCHLORIDE 25 MG: 50 INJECTION, SOLUTION INTRAMUSCULAR; INTRAVENOUS at 02:10

## 2024-10-31 RX ADMIN — DIPHENHYDRAMINE HYDROCHLORIDE 25 MG: 50 INJECTION, SOLUTION INTRAMUSCULAR; INTRAVENOUS at 06:10

## 2024-10-31 RX ADMIN — GABAPENTIN 600 MG: 300 CAPSULE ORAL at 09:10

## 2024-10-31 RX ADMIN — ALPRAZOLAM 0.25 MG: 0.25 TABLET ORAL at 09:10

## 2024-10-31 NOTE — PLAN OF CARE
Problem: Adult Inpatient Plan of Care  Goal: Plan of Care Review  Outcome: Met  Goal: Patient-Specific Goal (Individualized)  Outcome: Met  Goal: Absence of Hospital-Acquired Illness or Injury  Outcome: Met  Goal: Optimal Comfort and Wellbeing  Outcome: Met  Goal: Readiness for Transition of Care  Outcome: Met     Problem: Infection  Goal: Absence of Infection Signs and Symptoms  Outcome: Met     Problem: Pneumonia  Goal: Fluid Balance  Outcome: Met  Goal: Resolution of Infection Signs and Symptoms  Outcome: Met  Goal: Effective Oxygenation and Ventilation  Outcome: Met     Problem: Pain Acute  Goal: Optimal Pain Control and Function  Outcome: Met   Pt discharged, AVS given,education completed. Pt verbalized understanding. All questions and concerns were met. Awaiting bedside delivery.   Pt not seen

## 2024-10-31 NOTE — SUBJECTIVE & OBJECTIVE
Subjective:  Pt says vaso-occlusive pain has improved from day of admission but still in debilitating pain today.  Abdominal pain resolved.  Tolerating diet with no nausea or vomiting.    High Risk Conditions: Patient is currently receiving parenteral controlled substances: Dilaudid       Objective:     Vital signs reviewed        Physical Exam  Constitutional:       Appearance: She is not toxic-appearing.   HENT:      Mouth/Throat:      Mouth: Mucous membranes are moist.   Pulmonary:      Effort: Pulmonary effort is normal. No respiratory distress.   Abdominal:      General: There is no distension.   Musculoskeletal:         General: No deformity. Normal range of motion.   Skin:     General: Skin is dry.   Neurological:      General: No focal deficit present.      Mental Status: She is alert.      Cranial Nerves: No cranial nerve deficit.   Psychiatric:         Mood and Affect: Mood normal.         Behavior: Behavior normal.       Significant Labs: All pertinent labs within the past 24 hours have been reviewed.    Significant Imaging: I have reviewed all pertinent imaging results/findings within the past 24 hours.

## 2024-10-31 NOTE — PLAN OF CARE
CHW (edelmira) called to schedule a sooner appointment for the pt..Violette from scheduling sent a message to the pt pcp scheduling team to contact pt for a sooner appointment

## 2024-10-31 NOTE — ASSESSMENT & PLAN NOTE
Likely sickle cell pain crisis vs muscle spasms vs other non specific back pain  CT AP shows Degenerative changes of the spine. No suspicious osseous lesions  Cont multimodal pain management including PCA pump, muscle relaxers, heat  S/p IV hydration, encourage PO fluid intake

## 2024-10-31 NOTE — PLAN OF CARE
Problem: Adult Inpatient Plan of Care  Goal: Plan of Care Review  Outcome: Progressing  Goal: Patient-Specific Goal (Individualized)  Outcome: Progressing  Goal: Absence of Hospital-Acquired Illness or Injury  Outcome: Progressing  Goal: Optimal Comfort and Wellbeing  Outcome: Progressing  Goal: Readiness for Transition of Care  Outcome: Progressing     Problem: Infection  Goal: Absence of Infection Signs and Symptoms  Outcome: Progressing     Problem: Pneumonia  Goal: Fluid Balance  Outcome: Progressing  Goal: Resolution of Infection Signs and Symptoms  Outcome: Progressing  Goal: Effective Oxygenation and Ventilation  Outcome: Progressing     Problem: Pain Acute  Goal: Optimal Pain Control and Function  Outcome: Progressing

## 2024-10-31 NOTE — ASSESSMENT & PLAN NOTE
Nausea/vomiting    CT with area fullness and gastric body, recommend EGD.  Gastroenterology consulted, plan on outpatient follow up.  Lipase normal.  WBC Normal.   Symptomatic treatment.  Resolved with pain management, possibly related to vaso-occlusive episode.  PRN antiemetics (QTC prolonged on admission, resolved)

## 2024-10-31 NOTE — PLAN OF CARE
CHW met with patient/family at bedside. Patient experience rounding completed and reviewed the following.     Do you know your discharge plan? Yes,Home    Have you discussed your needs and preferences with your SW/CM? Yes     If you are discharging home, do you have help at home? Yes     Do you think you will need help additional at home at discharge? Yes     Do you currently have difficulty keeping up with bills, affording medicine or buying food? No    Assigned SW/CM notified of any patient/family needs or concerns. Appropriate resources provided to address patient's needs. Keyur Cedeno [facility]   Case Management

## 2024-10-31 NOTE — NURSING
Pt is AAOx4.  Pt stable no complaint No distress noted.  Pt  has PCA pump intact and aware the use.   Call light in reach. Bed in low locked position.   Side rails x2. Belongings at bedside.   Pt free of fall and injuries Questions and concerns voiced and answered.    Plan of care continues.

## 2024-10-31 NOTE — ASSESSMENT & PLAN NOTE
unable to keep up with oral fluid intake on admission.  s/p IV fluids  multimodal pain regimen including PCA pump with improved symptoms.  supplemental O2 with PCA.  Cont incentive spirometer to reduce incidence of acute chest syndrome  continue VTE regimen  cont folic acid daily   Heme/onc appointment on 11/21/24

## 2024-10-31 NOTE — PROGRESS NOTES
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 She complains of ongoing pain in her abdomen and paraspinal area. The pain in the abdomen is mostly in the RUQ and feels like a cramping sensation. Also epigastric area and diffusely throughout the abdomen to a lesser degree. Nothing makes this pain better or worse (such as eating, drinking, or her home morphine/oxycodone). The pain does not feeling like indigestion. She reports decreased PO intake 2/2 nausea though she is tolerating liquids. No longer having emesis. No diarrhea or constipation. No blood BM, no dysuria. She also reports pain that is most significant around her lumbar spine and radiates up to her cervical spine area. Feels like spasms. Zanaflex and heat do help that pain. Denies any positional component to her back pain. Denies radiation into her legs. No numbness.tingling. No injury, heavy lifting, trauma, or inciting event. She reports she had this pain last admission and it has not resolved. She feels it is related to sickle cell. She moved from [place] and has not established with a hematologist here yet, but does have an appointment scheduled for 11/20. Concerned she will run out of oral pain medications before then.    In the ED: Afebrile, HR 90-110s. Hypertensive. Otherwise HDS. Hbg 10.3. WBC normal. Lipase normal 12. LFTs normal. BNP and troponin normal. Cr stable 1.1. CXR without acute process. CT AP pending at time of admission.  Given 1L  NS, multiple rounds of IV hydromorphone, potassium, magnesium, phenergan, Pepcid. Admitted to .    Overview/Hospital Course:  Admitted to hospital medicine with sickle cell vaso-occlusive pain episode.  Started multimodal pain management including PCA pump, O2, fluids, antiemetics.  IV fluid shoulders noted, however, d/t abdominal pain, nausea, vomiting, pt unable to keep up with oral intake so IV fluids were initiated on day of admission.  Pain overall improved, plan to wean PCA pump.  CT AP revealed area of fullness in the region of the gastric body.  Gastroenterology consulted and recommended outpatient follow up.    Subjective:  Pt says vaso-occlusive pain has improved from day of admission but still in debilitating pain today.  Abdominal pain resolved.  Tolerating diet with no nausea or vomiting.    High Risk Conditions: Patient is currently receiving parenteral controlled substances: Dilaudid       Objective:     Vital signs reviewed        Physical Exam  Constitutional:       Appearance: She is not toxic-appearing.   HENT:      Mouth/Throat:      Mouth: Mucous membranes are moist.   Pulmonary:      Effort: Pulmonary effort is normal. No respiratory distress.   Abdominal:      General: There is no distension.   Musculoskeletal:         General: No deformity. Normal range of motion.   Skin:     General: Skin is dry.   Neurological:      General: No focal deficit present.      Mental Status: She is alert.      Cranial Nerves: No cranial nerve deficit.   Psychiatric:         Mood and Affect: Mood normal.         Behavior: Behavior normal.       Significant Labs: All pertinent labs within the past 24 hours have been reviewed.    Significant Imaging: I have reviewed all pertinent imaging results/findings within the past 24 hours.      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  s/p IV fluids  multimodal pain regimen including PCA  pump with improved symptoms.  supplemental O2 with PCA.  Cont incentive spirometer to reduce incidence of acute chest syndrome  continue VTE regimen  cont folic acid daily   Heme/onc appointment on 11/21/24    Midline low back pain without sciatica  Likely sickle cell pain crisis vs muscle spasms vs other non specific back pain  CT AP shows Degenerative changes of the spine. No suspicious osseous lesions  Cont multimodal pain management including PCA pump, muscle relaxers, heat  S/p IV hydration, encourage PO fluid intake    Right upper quadrant abdominal pain  Nausea/vomiting    CT with area fullness and gastric body, recommend EGD.  Gastroenterology consulted, plan on outpatient follow up.  Lipase normal.  WBC Normal.   Symptomatic treatment.  Resolved with pain management, possibly related to vaso-occlusive episode.  PRN antiemetics (QTC prolonged on admission, resolved)    Thrombosis of internal carotid, left  Continue lovenox injections BID     QT prolongation  Improved on repeat EKG  Okay for antiemetics  Avoid other QT prolonging agents  Daily EKGs and telemetry    History of pulmonary embolism  Continue lovenox injections BID     Trigeminal neuralgia  Continue home gabapentin     Hypertension  BP low normal, holding home amlodipine and carvedilol  Resume if hypertensive      General recommendations for the management of sickle cell vaso-occlusive pain episode.    -Early and aggressive treatment of pain with scheduled IV opioids and PCA if necessary to achieve early control of pain. This strategy has been shown to decrease length of stay frequency of acute pain episodes.      -When tapering of opioids: perform during the day time, taper 10-20% at a time and decrease the dose instead of increasing interval between doses. Convert to PO when IV dose is roughly equal to home dose.     -The gold standard for the assessment of pain is the patient's report. There are no combination of physical findings or laboratory  tests that can be used to determine whether sickle cell disease patients are in pain. Hemoglobin stability and absence of hemolysis should not be used to justify withholding or under-dosing pain medications.     VTE Risk Mitigation (From admission, onward)           Ordered     heparin, porcine (PF) 100 unit/mL injection flush 500 Units  As needed (PRN)         10/29/24 0327     enoxaparin injection 100 mg  Every 12 hours         10/28/24 0948     IP VTE LOW RISK PATIENT  Once         10/28/24 0432     Place sequential compression device  Until discontinued         10/28/24 0432                    Discharge Planning   ALYSE: 11/1/2024     Code Status: Full Code   Is the patient medically ready for discharge?:     Reason for patient still in hospital (select all that apply): Patient trending condition and Treatment  Discharge Plan A: Home                  Chase Gasca III, MD  Department of Hospital Medicine   [facility]

## 2024-10-31 NOTE — RESPIRATORY THERAPY
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    24 Hours Vitals Range:  Temp:  [97.5 °F (36.4 °C)-99 °F (37.2 °C)]   Pulse:  [73-92]   Resp:  [1-18]   BP: (117-143)/(57-84)   SpO2:  [95 %-100 %]     Labs:    Recent Labs     10/29/24  0333 10/30/24  0322 10/31/24  0453    140 139   K 3.8 4.1 4.1    108 106   CO2 24 24 25   BUN 14 13 13   CREATININE 1.1 1.0 0.9   GLU 77 67* 80   PHOS 3.8 4.6* 4.0   MG 2.1 2.0 2.0        No results for input(s): "PH", "PCO2", "PO2", "HCO3", "POCSATURATED", "BE" in the last 72 hours.    ASSESSMENT/INTERVENTIONS      Last VS   Temp: 99 °F (37.2 °C) (10/31 1140)  Pulse: 82 (10/31 1140)  Resp: 1 (10/31 1140)  BP: 135/84 (10/31 1140)  SpO2: 100 % (10/31 1140)    Level of Consciousness: Level of Consciousness (AVPU): alert  Respiratory Effort: Respiratory Effort: Normal, Unlabored Expansion/Accessory Muscle Usage: Expansion/Accessory Muscles/Retractions: expansion symmetric  All Lung Field Breath Sounds: All Lung Fields Breath Sounds: Anterior:, Posterior:, clear, diminished  Is the ETCO2 monitor on? Yes  Is the patient wearing a cannula? Yes  Are ETCO2 orders placed? Yes  Is the patient on a PCA pump? Yes  ETCO2 monitored: ETCO2 (mmHg): 43 mmHg  Ambu at bedside:      Active Orders   Respiratory Care    END TIDAL CO2 " MONITOR Q12H     Frequency: Q12H     Number of Occurrences: Until Specified    Incentive spirometry     Frequency: Q4H WAKE     Number of Occurrences: Until Specified    Inhalation Treatment Q4H PRN     Frequency: Q4H PRN     Number of Occurrences: Until Specified    Oxygen Continuous     Frequency: Continuous     Number of Occurrences: Until Specified     Order Questions:      Device type: Low flow      Device: Nasal Cannula (1- 5 Liters)      LPM: 1      Titrate O2 per Oxygen Titration Protocol: Yes      To maintain SpO2 goal of: >= 90%      Notify MD of: Inability to achieve desired SpO2; Sudden change in patient status and requires 20% increase in FiO2; Patient requires >60% FiO2    Oxygen PRN     Frequency: PRN     Number of Occurrences: Until Specified     Order Questions:      Device type: Low flow      Device: Nasal Cannula (1- 5 Liters)      LPM: 1-5      Titrate O2 per Oxygen Titration Protocol: Yes      To maintain SpO2 goal of: >= 90%      Notify MD of: Inability to achieve desired SpO2; Sudden change in patient status and requires 20% increase in FiO2; Patient requires >60% FiO2    Pulse Oximetry Q Shift     Frequency: Q Shift     Number of Occurrences: Until Specified       RECOMMENDATIONS    We recommend: RRT Recs: Continue POC per primary team.      FOLLOW-UP    Please call back the Rapid Response RTBetty RRT at x [phone] for any questions or concerns.

## 2024-10-31 NOTE — NURSING
PCA pump stopped. Port de-accessed. Discharge instructions reviewed with patient. Questions encouraged and answered. Telemetry monitor removed. Patient dressed appropriately for weather. Patient stable, left unit independently with no issues

## 2024-10-31 NOTE — TELEPHONE ENCOUNTER
Advised pt will discuss with Dr. Clark about pain medication, blood exchange, and a possible sooner appointment. Pt verbalized understanding and agreeable to hearing back.

## 2024-10-31 NOTE — DISCHARGE SUMMARY
[facility] [address] [place] [address] Medicine  Discharge Summary      Patient Name: Nazanin Malone  MRN: [id number]  VLADIMIR: [id number]  Patient Class: IP- Inpatient  Admission Date: 10/27/2024  Hospital Length of Stay: 3 days  Discharge Date and Time: 10/31/2024  5:27 PM  Attending Physician: Camryn Silva MD   Discharging Provider: Camryn Silva MD  Primary Care Provider: Pee Montgomery MD  [facility] Medicine Team: [facility] Camryn Silva MD  Primary Care Team: [facility]    HPI:   Nazanin Malone is a 44 y.o. 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 She complains of ongoing pain in her abdomen and paraspinal area. The pain in the abdomen is mostly in the RUQ and feels like a cramping sensation. Also epigastric area and diffusely throughout the abdomen to a lesser degree. Nothing makes this pain better or worse (such as eating, drinking, or her home morphine/oxycodone). The pain does not feeling like indigestion. She reports decreased PO intake 2/2 nausea though she is tolerating liquids. No longer having emesis. No diarrhea or constipation. No blood BM, no dysuria. She also reports pain that is most significant around her lumbar spine and radiates up to her cervical spine area. Feels like spasms. Zanaflex and heat do help that pain. Denies any positional component to her back pain. Denies radiation into her legs. No numbness.tingling. No injury, heavy lifting, trauma, or inciting event. She reports she had this pain last admission and it has not resolved. She feels it is related to sickle cell. She moved from [place] and has not established with a hematologist here yet, but does have an appointment scheduled for 11/20. Concerned she will run out of oral pain medications before then.    In the ED: Afebrile, HR 90-110s. Hypertensive. Otherwise HDS. Hbg 10.3. WBC normal. Lipase normal 12. LFTs  normal. BNP and troponin normal. Cr stable 1.1. CXR without acute process. CT AP pending at time of admission.  Given 1L NS, multiple rounds of IV hydromorphone, potassium, magnesium, phenergan, Pepcid. Admitted to .    * No surgery found *      Hospital Course:   Admitted to hospital medicine with sickle cell vaso-occlusive pain episode.  Started multimodal pain management including PCA pump, O2, fluids, antiemetics.  IV fluid shoulders noted, however, d/t abdominal pain, nausea, vomiting, pt unable to keep up with oral intake so IV fluids were initiated on day of admission.  Pain overall improved, plan to wean PCA pump.  CT AP revealed area of fullness in the region of the gastric body.  Gastroenterology consulted and recommended outpatient follow up.     Patient stable for discharge on previously home medication regimen. She has heme-onc follow-up scheduled for 11/21.     Patient seen and examined on day of discharge and deemed appropriate for discharge. Plan discussed with patient, who was agreeable and amenable. Medications were discussed and reviewed, outpatient follow-up scheduled, ER precautions were given, all questions were answered to the patient's satisfaction, and patient was subsequently discharged.      Goals of Care Treatment Preferences:  Code Status: Full Code      SDOH Screening:  The patient was screened for food insecurity, housing instability, transportation needs, utility difficulties, and interpersonal safety. The social determinant(s) of health identified as a concern this admission are:  Housing instability    The plan to address these concerns is: CM    Social Drivers of Health with Concerns     Housing Stability: High Risk (10/29/2024)        Consults:   Consults (From admission, onward)          Status Ordering Provider     Inpatient consult to Gastroenterology  Once        Provider:  (Not yet assigned)    Completed JORDI WOODS            Neuro  Trigeminal neuralgia  Continue  home gabapentin     Cardiac/Vascular  Thrombosis of internal carotid, left  Continue lovenox injections BID     QT prolongation  Improved on repeat EKG  Okay for antiemetics  Avoid other QT prolonging agents  Daily EKGs and telemetry    Hypertension  BP low normal, holding home amlodipine and carvedilol  Resume if hypertensive    Hematology  History of pulmonary embolism  Continue lovenox injections BID     Oncology  * 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  s/p IV fluids  multimodal pain regimen including PCA pump with improved symptoms.  supplemental O2 with PCA.  Cont incentive spirometer to reduce incidence of acute chest syndrome  continue VTE regimen  cont folic acid daily   Heme/onc appointment on 11/21/24    GI  Right upper quadrant abdominal pain  Nausea/vomiting    CT with area fullness and gastric body, recommend EGD.  Gastroenterology consulted, plan on outpatient follow up.  Lipase normal.  WBC Normal.   Symptomatic treatment.  Resolved with pain management, possibly related to vaso-occlusive episode.  PRN antiemetics (QTC prolonged on admission, resolved)    Orthopedic  Midline low back pain without sciatica  Likely sickle cell pain crisis vs muscle spasms vs other non specific back pain  CT AP shows Degenerative changes of the spine. 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 [D57.00] 04/16/2017 Yes    Right upper quadrant abdominal pain [R10.11] 10/28/2024 Yes    Midline low back pain without sciatica [M54.50] 10/28/2024 Yes    Abdominal pain [R10.9] 10/28/2024 Yes    Thrombosis of internal carotid, left [I65.22] 08/03/2024 Yes    QT prolongation [R94.31] 12/22/2020 Yes    History of pulmonary embolism  [Z86.711] 06/08/2020 Yes    Trigeminal neuralgia [G50.0] 03/20/2018 Yes    Hypertension [I10] 04/16/2017 Yes      Problems Resolved During this Admission:       Discharged Condition: stable    Disposition: Home or Self Care    Follow Up:    Patient Instructions:   No discharge procedures on file.    Significant Diagnostic Studies: Labs: BMP:   Recent Labs   Lab 10/30/24  0322 10/31/24  0453   GLU 67* 80    139   K 4.1 4.1    106   CO2 24 25   BUN 13 13   CREATININE 1.0 0.9   CALCIUM 8.9 9.0   MG 2.0 2.0    and CBC   Recent Labs   Lab 10/30/24  0322 10/31/24  0453   WBC 6.92 5.74   HGB 7.9* 7.9*   HCT 23.2* 23.4*    270       Pending Diagnostic Studies:       None           Medications:  Reconciled Home Medications:      Medication List        START taking these medications      naloxone 4 mg/actuation Spry  Commonly known as: NARCAN  1 spray (4 mg total) by Nasal route once. ONLY TO BE USED IF CONCERNED FOR OPIOID OVERDOSE for 1 dose     polyethylene glycol 17 gram/dose powder  Commonly known as: GLYCOLAX  Use to cap to measure 17g, mix with liquid, and take by mouth once daily.  Start taking on: November 1, 2024     scopolamine 1.3-1.5 mg (1 mg over 3 days)  Commonly known as: TRANSDERM-SCOP  Place 1 patch onto the skin Every 3 (three) days.            CHANGE how you take these medications      enoxaparin 100 mg/mL Syrg  Commonly known as: LOVENOX  Inject 1 mL (100 mg total) into the skin every 12 (twelve) hours.  What changed:   medication strength  how much to take     gabapentin 300 MG capsule  Commonly known as: NEURONTIN  Take 2 capsules (600 mg total) by mouth 3 (three) times daily.  What changed: how much to take     promethazine 12.5 MG Tab  Commonly known as: PHENERGAN  Take 1 tablet (12.5 mg total) by mouth every 6 (six) hours as needed (nausea/vomiting).  What changed:   medication strength  how much to take  reasons to take this     tiZANidine 4 MG tablet  Commonly known as:  ZANAFLEX  Take 1 tablet (4 mg total) by mouth every 8 (eight) hours. for 10 days  What changed:   when to take this  reasons to take this            CONTINUE taking these medications      acetaminophen 500 MG tablet  Commonly known as: TYLENOL  Take 1,000 mg by mouth every 8 (eight) hours as needed for Pain.     albuterol 90 mcg/actuation inhaler  Commonly known as: VENTOLIN HFA  Inhale 2 puffs into the lungs every 6 (six) hours as needed for Wheezing or Shortness of Breath. Rescue     ALPRAZolam 0.25 MG tablet  Commonly known as: XANAX  Take 1 tablet (0.25 mg total) by mouth 2 (two) times daily as needed for Anxiety.     amLODIPine 10 MG tablet  Commonly known as: NORVASC  Take 1 tablet (10 mg total) by mouth once daily.     FLUoxetine 40 MG capsule  Take 1 capsule (40 mg total) by mouth once daily.     fluticasone propionate 50 mcg/actuation nasal spray  Commonly known as: FLONASE  INSTILL 1 SPRAY INTO EACH NOSTRIL EVERY DAY     folic acid 1 MG tablet  Commonly known as: FOLVITE  Take 1 tablet (1 mg total) by mouth once daily.     morphine 30 MG 12 hr tablet  Commonly known as: MS CONTIN  Take 1 tablet (30 mg total) by mouth every 12 (twelve) hours. for 21 days     oxyCODONE 15 MG Tab  Commonly known as: ROXICODONE  Take 1 tablet (15 mg total) by mouth every 4 (four) hours as needed for Pain.     senna-docusate 8.6-50 mg 8.6-50 mg per tablet  Commonly known as: PERICOLACE  Take 1 tablet by mouth daily as needed for Constipation.     VITAMIN C ORAL  Take 1 capsule by mouth once daily.            STOP taking these medications      carvediloL 25 MG tablet  Commonly known as: COREG              Indwelling Lines/Drains at time of discharge:   Lines/Drains/Airways       Central Venous Catheter Line  Duration                  PowerPort A Cath Single Lumen 10/27/24 2337 Subclavian Right 3 days                    Time spent on the discharge of patient: 35 minutes         Camryn Silva MD  Department of Hospital  Medicine  [facility]

## 2024-10-31 NOTE — TELEPHONE ENCOUNTER
----- Message from [facility] sent at 10/31/2024  1:39 PM CDT -----  Regarding: FW: Appt  Contact: [phone]  Samiy,  Patient is currently in hosp and she said that everytime she gets out and run out of medication she gets sick.  She said she hasn't had a transfusion in awhile and think she may need that.  Patient is due to be released today.  If you can call her about her medication that would be great.  [phone].  Thanks!  ----- Message -----  From: Violette Castellon  Sent: 10/31/2024  11:01 AM CDT  To: [facility]  Pool  Subject: Appt                                                       Name of Caller: Silvana with case management      Contact Preference:   [phone]    Treating physician: Jarred gonzalez MD     Nature of Call: Requesting sooner appt for hospital follow up appt on pt's behalf

## 2024-10-31 NOTE — PLAN OF CARE
[facility]  Discharge Final Note    Primary Care Provider: Pee Montgomery MD    Expected Discharge Date: 10/31/2024    Future Appointments   Date Time Provider Department Center   11/21/2024  7:30 AM [facility]   11/21/2024  8:40 AM Jarred Clark MD [facility]     Pt discharged home with no services.   Keyur Heaton, RN,BSN      Final Discharge Note (most recent)       Final Note - 10/31/24 1141          Final Note    Assessment Type Final Discharge Note     Anticipated Discharge Disposition Home or Self Care     Hospital Resources/Appts/Education Provided Provided patient/caregiver with written discharge plan information;Provided education on problems/symptoms using teachback;Appointments scheduled and added to AVS        Post-Acute Status    Discharge Delays None known at this time                     Important Message from Medicare  Important Message from Medicare regarding Discharge Appeal Rights: Given to patient/caregiver, Explained to patient/caregiver, Signed/date by patient/caregiver     Date IMM was signed: 10/31/24  Time IMM was signed: 1001

## 2024-11-10 ENCOUNTER — HOSPITAL ENCOUNTER (EMERGENCY)
Facility: HOSPITAL | Age: 44
Discharge: HOME | End: 2024-11-10
Attending: EMERGENCY MEDICINE
Payer: MEDICARE

## 2024-11-10 VITALS
RESPIRATION RATE: 18 BRPM | HEART RATE: 86 BPM | DIASTOLIC BLOOD PRESSURE: 81 MMHG | TEMPERATURE: 99 F | SYSTOLIC BLOOD PRESSURE: 143 MMHG | WEIGHT: 208 LBS | OXYGEN SATURATION: 99 % | BODY MASS INDEX: 38.28 KG/M2 | HEIGHT: 62 IN

## 2024-11-10 DIAGNOSIS — R07.89 CHEST DISCOMFORT: ICD-10-CM

## 2024-11-10 DIAGNOSIS — R05.9 COUGH: ICD-10-CM

## 2024-11-10 DIAGNOSIS — J34.89 NASAL CONGESTION WITH RHINORRHEA: ICD-10-CM

## 2024-11-10 DIAGNOSIS — J02.9 SORE THROAT: Primary | ICD-10-CM

## 2024-11-10 DIAGNOSIS — R10.13 EPIGASTRIC ABDOMINAL PAIN: ICD-10-CM

## 2024-11-10 DIAGNOSIS — Z86.2 HISTORY OF SICKLE CELL DISEASE: ICD-10-CM

## 2024-11-10 DIAGNOSIS — R09.81 NASAL CONGESTION WITH RHINORRHEA: ICD-10-CM

## 2024-11-10 DIAGNOSIS — J06.9 VIRAL UPPER RESPIRATORY TRACT INFECTION WITH COUGH: ICD-10-CM

## 2024-11-10 DIAGNOSIS — R11.2 NAUSEA AND VOMITING, UNSPECIFIED VOMITING TYPE: ICD-10-CM

## 2024-11-10 LAB
ALBUMIN SERPL BCP-MCNC: 3.9 G/DL (ref 3.5–5.2)
ALP SERPL-CCNC: 90 U/L (ref 40–150)
ALT SERPL W/O P-5'-P-CCNC: 20 U/L (ref 10–44)
ANION GAP SERPL CALC-SCNC: 14 MMOL/L (ref 8–16)
AST SERPL-CCNC: 29 U/L (ref 10–40)
BASOPHILS # BLD AUTO: 0.04 K/UL (ref 0–0.2)
BASOPHILS NFR BLD: 0.6 % (ref 0–1.9)
BILIRUB SERPL-MCNC: 1.1 MG/DL (ref 0.1–1)
BUN SERPL-MCNC: 10 MG/DL (ref 6–20)
BUN SERPL-MCNC: 10 MG/DL (ref 6–30)
CALCIUM SERPL-MCNC: 10.2 MG/DL (ref 8.7–10.5)
CHLORIDE SERPL-SCNC: 106 MMOL/L (ref 95–110)
CHLORIDE SERPL-SCNC: 108 MMOL/L (ref 95–110)
CO2 SERPL-SCNC: 20 MMOL/L (ref 23–29)
CREAT SERPL-MCNC: 1.1 MG/DL (ref 0.5–1.4)
CREAT SERPL-MCNC: 1.1 MG/DL (ref 0.5–1.4)
DIFFERENTIAL METHOD BLD: ABNORMAL
EOSINOPHIL # BLD AUTO: 0 K/UL (ref 0–0.5)
EOSINOPHIL NFR BLD: 0 % (ref 0–8)
ERYTHROCYTE [DISTWIDTH] IN BLOOD BY AUTOMATED COUNT: 18.7 % (ref 11.5–14.5)
EST. GFR  (NO RACE VARIABLE): >60 ML/MIN/1.73 M^2
GLUCOSE SERPL-MCNC: 117 MG/DL (ref 70–110)
GLUCOSE SERPL-MCNC: 120 MG/DL (ref 70–110)
GROUP A STREP, MOLECULAR: NEGATIVE
HCT VFR BLD AUTO: 26.7 % (ref 37–48.5)
HCT VFR BLD CALC: 31 %PCV (ref 36–54)
HGB BLD-MCNC: 9.1 G/DL (ref 12–16)
IMM GRANULOCYTES # BLD AUTO: 0.01 K/UL (ref 0–0.04)
IMM GRANULOCYTES NFR BLD AUTO: 0.1 % (ref 0–0.5)
LIPASE SERPL-CCNC: 25 U/L (ref 4–60)
LYMPHOCYTES # BLD AUTO: 1.3 K/UL (ref 1–4.8)
LYMPHOCYTES NFR BLD: 18.4 % (ref 18–48)
MCH RBC QN AUTO: 22.5 PG (ref 27–31)
MCHC RBC AUTO-ENTMCNC: 34.1 G/DL (ref 32–36)
MCV RBC AUTO: 66 FL (ref 82–98)
MONOCYTES # BLD AUTO: 0.3 K/UL (ref 0.3–1)
MONOCYTES NFR BLD: 4.1 % (ref 4–15)
NEUTROPHILS # BLD AUTO: 5.6 K/UL (ref 1.8–7.7)
NEUTROPHILS NFR BLD: 76.8 % (ref 38–73)
NRBC BLD-RTO: 3 /100 WBC
OHS QRS DURATION: 94 MS
OHS QTC CALCULATION: 477 MS
PLATELET # BLD AUTO: 546 K/UL (ref 150–450)
PMV BLD AUTO: 9.5 FL (ref 9.2–12.9)
POC IONIZED CALCIUM: 1.14 MMOL/L (ref 1.06–1.42)
POC TCO2 (MEASURED): 20 MMOL/L (ref 23–29)
POTASSIUM BLD-SCNC: 3.5 MMOL/L (ref 3.5–5.1)
POTASSIUM SERPL-SCNC: 3.5 MMOL/L (ref 3.5–5.1)
PROT SERPL-MCNC: 8.6 G/DL (ref 6–8.4)
RBC # BLD AUTO: 4.04 M/UL (ref 4–5.4)
RETICS/RBC NFR AUTO: 1.6 % (ref 0.5–2.5)
SAMPLE: ABNORMAL
SODIUM BLD-SCNC: 141 MMOL/L (ref 136–145)
SODIUM SERPL-SCNC: 142 MMOL/L (ref 136–145)
TROPONIN I SERPL DL<=0.01 NG/ML-MCNC: <0.006 NG/ML (ref 0–0.03)
WBC # BLD AUTO: 7.23 K/UL (ref 3.9–12.7)

## 2024-11-10 PROCEDURE — 96376 TX/PRO/DX INJ SAME DRUG ADON: CPT

## 2024-11-10 PROCEDURE — 85025 COMPLETE CBC W/AUTO DIFF WBC: CPT | Performed by: EMERGENCY MEDICINE

## 2024-11-10 PROCEDURE — 25000003 PHARM REV CODE 250: Performed by: STUDENT IN AN ORGANIZED HEALTH CARE EDUCATION/TRAINING PROGRAM

## 2024-11-10 PROCEDURE — 85045 AUTOMATED RETICULOCYTE COUNT: CPT | Performed by: EMERGENCY MEDICINE

## 2024-11-10 PROCEDURE — 25000003 PHARM REV CODE 250: Performed by: EMERGENCY MEDICINE

## 2024-11-10 PROCEDURE — 84484 ASSAY OF TROPONIN QUANT: CPT | Performed by: EMERGENCY MEDICINE

## 2024-11-10 PROCEDURE — 93010 ELECTROCARDIOGRAM REPORT: CPT | Mod: ,,, | Performed by: INTERNAL MEDICINE

## 2024-11-10 PROCEDURE — 80047 BASIC METABLC PNL IONIZED CA: CPT

## 2024-11-10 PROCEDURE — 83690 ASSAY OF LIPASE: CPT | Performed by: EMERGENCY MEDICINE

## 2024-11-10 PROCEDURE — 93005 ELECTROCARDIOGRAM TRACING: CPT

## 2024-11-10 PROCEDURE — 25500020 PHARM REV CODE 255: Performed by: EMERGENCY MEDICINE

## 2024-11-10 PROCEDURE — 96365 THER/PROPH/DIAG IV INF INIT: CPT

## 2024-11-10 PROCEDURE — 80053 COMPREHEN METABOLIC PANEL: CPT | Performed by: EMERGENCY MEDICINE

## 2024-11-10 PROCEDURE — 63600175 PHARM REV CODE 636 W HCPCS: Performed by: EMERGENCY MEDICINE

## 2024-11-10 PROCEDURE — 87651 STREP A DNA AMP PROBE: CPT | Performed by: EMERGENCY MEDICINE

## 2024-11-10 PROCEDURE — 96375 TX/PRO/DX INJ NEW DRUG ADDON: CPT

## 2024-11-10 PROCEDURE — 63600175 PHARM REV CODE 636 W HCPCS: Performed by: STUDENT IN AN ORGANIZED HEALTH CARE EDUCATION/TRAINING PROGRAM

## 2024-11-10 PROCEDURE — 99285 EMERGENCY DEPT VISIT HI MDM: CPT | Mod: 25

## 2024-11-10 RX ORDER — HYDROMORPHONE HYDROCHLORIDE 1 MG/ML
2 INJECTION, SOLUTION INTRAMUSCULAR; INTRAVENOUS; SUBCUTANEOUS
Status: COMPLETED | OUTPATIENT
Start: 2024-11-10 | End: 2024-11-10

## 2024-11-10 RX ORDER — HEPARIN 100 UNIT/ML
5 SYRINGE INTRAVENOUS ONCE
Status: COMPLETED | OUTPATIENT
Start: 2024-11-10 | End: 2024-11-10

## 2024-11-10 RX ORDER — DROPERIDOL 2.5 MG/ML
2.5 INJECTION, SOLUTION INTRAMUSCULAR; INTRAVENOUS ONCE
Status: COMPLETED | OUTPATIENT
Start: 2024-11-10 | End: 2024-11-10

## 2024-11-10 RX ORDER — HYDROMORPHONE HYDROCHLORIDE 1 MG/ML
2 INJECTION, SOLUTION INTRAMUSCULAR; INTRAVENOUS; SUBCUTANEOUS ONCE
Status: DISCONTINUED | OUTPATIENT
Start: 2024-11-10 | End: 2024-11-10

## 2024-11-10 RX ORDER — HEPARIN 100 UNIT/ML
5 SYRINGE INTRAVENOUS
Status: DISCONTINUED | OUTPATIENT
Start: 2024-11-10 | End: 2024-11-10 | Stop reason: HOSPADM

## 2024-11-10 RX ORDER — DOCUSATE SODIUM 100 MG
400 CAPSULE ORAL ONCE
Status: COMPLETED | OUTPATIENT
Start: 2024-11-10 | End: 2024-11-10

## 2024-11-10 RX ORDER — ONDANSETRON 4 MG/1
4 TABLET, ORALLY DISINTEGRATING ORAL EVERY 8 HOURS PRN
Qty: 15 TABLET | Refills: 0 | Status: SHIPPED | OUTPATIENT
Start: 2024-11-10 | End: 2024-11-20

## 2024-11-10 RX ADMIN — Medication 400 ML: at 04:11

## 2024-11-10 RX ADMIN — PROMETHAZINE HYDROCHLORIDE 12.5 MG: 25 INJECTION INTRAMUSCULAR; INTRAVENOUS at 12:11

## 2024-11-10 RX ADMIN — HYDROMORPHONE HYDROCHLORIDE 2 MG: 1 INJECTION, SOLUTION INTRAMUSCULAR; INTRAVENOUS; SUBCUTANEOUS at 04:11

## 2024-11-10 RX ADMIN — HYDROMORPHONE HYDROCHLORIDE 2 MG: 1 INJECTION, SOLUTION INTRAMUSCULAR; INTRAVENOUS; SUBCUTANEOUS at 02:11

## 2024-11-10 RX ADMIN — IOHEXOL 100 ML: 350 INJECTION, SOLUTION INTRAVENOUS at 02:11

## 2024-11-10 RX ADMIN — DROPERIDOL 2.5 MG: 2.5 INJECTION, SOLUTION INTRAMUSCULAR; INTRAVENOUS at 09:11

## 2024-11-10 RX ADMIN — HEPARIN 500 UNITS: 100 SYRINGE at 04:11

## 2024-11-10 RX ADMIN — HYDROMORPHONE HYDROCHLORIDE 2 MG: 1 INJECTION, SOLUTION INTRAMUSCULAR; INTRAVENOUS; SUBCUTANEOUS at 09:11

## 2024-11-10 RX ADMIN — HYDROMORPHONE HYDROCHLORIDE 2 MG: 1 INJECTION, SOLUTION INTRAMUSCULAR; INTRAVENOUS; SUBCUTANEOUS at 12:11

## 2024-11-10 NOTE — ED PROVIDER NOTES
Encounter Date: 11/10/2024       History     Chief Complaint   Patient presents with    Chest Pain     SICKLE CELL HAS PORT     HPI  45 Y/O non-smoker SCD F C/O 12-24 hours of multiple bouts of nonbloody nonbilious emesis leading to reproducible nonradiating bilateral peristernal chest pain with no associated dyspnea, palpitations, diaphoresis, migratory back pain.  She additionally reports 2-3 days of general malaise, sore throat, nasal congestion and intermittent cough with no reported shortness of breath or dyspnea on exertion.  No fever or chills.  Despite sore throat, she denies any voice change, dysphagia to solids or liquids, odynophagia, abdominal pain, abdominal distension, lack of flatulence or urinary symptoms no dysuria, increased urinary frequency or hesitancy reported.  No vaginal discharge and denies any high-risk sexual encounters.  No other surgeries besides previous  and tubal ligation.  No history of small-bowel obstruction.  She assures compliance in her daily oxycodone use for chronic pain secondary to sickle cell disease.  Otherwise, no injuries, lower extremity swelling or asymmetry, history of PE/DVT, back pain, migratory back pain, motor sensory deficits or other complaints.      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    Asthma     Depression     Hypertension     Morbid obesity     Opioid dependence 2017    Pneumonia due to Streptococcus pneumoniae 2017    Right lower lobe pneumonia 2017    Sepsis due to Streptococcus pneumoniae 2017    Sickle cell-beta thalassemia disease with pain     Trigeminal neuralgia      Past Surgical History:    Procedure Laterality Date     SECTION      TONSILLECTOMY      TUBAL LIGATION       Family History   Problem Relation Name Age of Onset    Heart disease Mother      Diabetes Mother      Heart disease Father      Breast cancer Neg Hx      Ovarian cancer Neg Hx      Colon cancer Neg Hx       Social History     Tobacco Use    Smoking status: Never    Smokeless tobacco: Never   Substance Use Topics    Alcohol use: No    Drug use: Yes     Types: Marijuana     Comment: periodically      Review of Systems    Physical Exam     Initial Vitals [11/10/24 0755]   BP Pulse Resp Temp SpO2   (!) 162/99 92 20 99 °F (37.2 °C) 99 %      MAP       --         Physical Exam  GENERAL: Well-appearing and Non-Toxic; Well-Nourished; mild distress secondary to nausea and active dry heaving.  HEENT: AT/NC; anicteric; speaking full sentences with no drooling.  NECK: Supple, FROM, no accessory muscle use.  THORAX/HEART: + UPPER THORAX PORT; Regular rate and rhythm, no M/G/T.   LUNGS: No Tachypnea with appreciated work of breathing and no accessory muscle use/Retractions, good inspiratory and expiratory air movement with lungs CTA B/L with no W/R/R.  ABDOMEN: Soft, ND, + EPIGASTRIC TT deep P.  No rigidity or involuntary guarding.  NEG Medellin's, Rovsing's, Psoas', Obturator's and McBurney's Signs.  BACK: Atraumatic, No CVA tenderness B/L.  EXTREMITIES: FROM.  SKIN: Warm, Dry, No Skin Tears or Rashes. No Jaundice.  VASCULAR: 2+ pulses Prox+Dist & Symm with no delay.  NEUROLOGIC: AAOx3, answered questions appropriately with no focal deficit.    ED Course   Procedures  Labs Reviewed   CBC W/ AUTO DIFFERENTIAL - Abnormal       Result Value    WBC 7.23      RBC 4.04      Hemoglobin 9.1 (*)     Hematocrit 26.7 (*)     MCV 66 (*)     MCH 22.5 (*)     MCHC 34.1      RDW 18.7 (*)     Platelets 546 (*)     MPV 9.5      Immature Granulocytes 0.1      Gran # (ANC) 5.6      Immature Grans (Abs) 0.01      Lymph # 1.3      Mono # 0.3      Eos # 0.0       Baso # 0.04      nRBC 3 (*)     Gran % 76.8 (*)     Lymph % 18.4      Mono % 4.1      Eosinophil % 0.0      Basophil % 0.6      Differential Method Automated     COMPREHENSIVE METABOLIC PANEL - Abnormal    Sodium 142      Potassium 3.5      Chloride 108      CO2 20 (*)     Glucose 117 (*)     BUN 10      Creatinine 1.1      Calcium 10.2      Total Protein 8.6 (*)     Albumin 3.9      Total Bilirubin 1.1 (*)     Alkaline Phosphatase 90      AST 29      ALT 20      eGFR >60.0      Anion Gap 14     ISTAT PROCEDURE - Abnormal    POC Glucose 120 (*)     POC BUN 10      POC Creatinine 1.1      POC Sodium 141      POC Potassium 3.5      POC Chloride 106      POC TCO2 (MEASURED) 20 (*)     POC Ionized Calcium 1.14      POC Hematocrit 31 (*)     Sample YG     GROUP A STREP, MOLECULAR    Group A Strep, Molecular Negative     LIPASE    Lipase 25     RETICULOCYTES    Retic 1.6     TROPONIN I    Troponin I <0.006       EKG Readings: (Independently Interpreted)   Initial Reading: No STEMI.   #1SR$@91bpm; RIGHT Axis Deviation; AZ/QRS/Qtc WNL; No STEMI.  ____________________  Nazario CHAVA Vidales MD, [facility]  Emergency Medicine Staff  8:42 AM 11/10/2024       ECG Results              EKG 12-lead (Final result)        Collection Time Result Time QRS Duration OHS QTC Calculation    11/10/24 08:02:18 11/10/24 11:49:37 94 477                     Final result by Interface, Lab [facility] (11/10/24 11:49:42)                   Narrative:    Test Reason : R07.89,    Vent. Rate : 091 BPM     Atrial Rate : 091 BPM     P-R Int : 156 ms          QRS Dur : 094 ms      QT Int : 388 ms       P-R-T Axes : 000 173 183 degrees     QTc Int : 477 ms    Limb lead reversal Now present  Normal sinus rhythm  Right axis deviation  Abnormal ECG  When compared with ECG of 31-OCT-2024 02:32,  Suggest serial electrocardiogram  Confirmed by GALEN FISHER MD ([id number]) on 11/10/2024 11:49:35 AM    Referred By: [email or web address]   SELF           Confirmed By:GALEN FISHER MD                                   Imaging Results              CT Abdomen Pelvis With IV Contrast NO Oral Contrast (Final result)  Result time 11/10/24 15:27:55      Final result by Jesse Ahmadi DO (11/10/24 15:27:55)                   Impression:      Numerous subcutaneous nodules along the anterior abdominal wall which have continued to increase in size and surrounding stranding across recent priors.  These are likely sequela of serial injections however correlation for cellulitis advised.    Similar appearing fullness in the region of the gastric body.  Correlation with EGD advised.    Stable 5 mm right lower lobe pulmonary nodule.  For a solid nodule <6 mm, Fleischner Society 2017 guidelines recommend no routine follow up for a low risk patient, or follow-up with non-contrast chest CT at 12 months in a high risk patient.    Electronically signed by resident: Burton Hartley  Date:    11/10/2024  Time:    14:48    Electronically signed by: Jesse Ahmadi  Date:    11/10/2024  Time:    15:27               Narrative:    EXAMINATION:  CT ABDOMEN PELVIS WITH IV CONTRAST    CLINICAL HISTORY:  Nausea/vomiting;Abdominal pain, acute, nonlocalized;    TECHNIQUE:  Axial images of the abdomen and pelvis were acquired after the use of 100 cc [id number] IV contrast. No oral contrast was administered.  Coronal and sagittal reconstructions were also obtained    COMPARISON:  CT abdomen pelvis 10/28/2024 and 10/21/2024    FINDINGS:  Heart: Normal in size. No pericardial effusion. No significant calcific coronary atherosclerosis.  Partially visualized port with tip terminating in the right atrium.    Lungs: Subsegmental atelectasis in the lung bases.  Stable 5 mm right lower lobe nodule (series 2, image 21).  No focal consolidation or pleural effusion.    Hepatobiliary: Liver is normal size.  Normal contour.  Ill-defined streaky hyperattenuation along the posterior right hepatic lobe, similar to priors.  No calcified gallstones. No  pericholecystic fluid or gallbladder wall thickening.No intrahepatic or extrahepatic biliary ductal dilatation.    Pancreas: No mass or peripancreatic fat stranding.    Spleen: Atrophic spleen with scattered parenchymal calcifications, similar to priors and likely secondary to reported history of sickle cell disease.    Adrenals: Unremarkable.    Kidneys/Ureters: Normal in size and location. Normal enhancement. No hydronephrosis or nephrolithiasis. No ureteral dilatation.    Bladder: Moderately distended to the level of L4-L5. No wall thickening or perivesicular inflammatory change.    Reproductive organs: The uterus and adnexae are unremarkable.    Peritoneum: No free air or free fluid.    Lymph nodes: No pathologically enlarged abdominopelvic lymph nodes.    Bowel/Mesentery: Similar appearance of the stomach with slight fullness in the region of the gastric body.  Small bowel is normal in caliber with no evidence of obstruction or inflammation. Colon demonstrates no focal wall thickening or obstruction. Appendix is visualized and unremarkable.    Abdominal wall:  Several subcutaneous nodules along the anterior abdominal wall which could relate to injection sites however are increasing in size and surrounding stranding over several recent priors.    Vasculature: No aneurysm. No significant calcific atherosclerosis.  Portal vasculature is patent.    Bones: Mild degenerative changes of the spine.  No acute fracture or suspicious osseous lesions.                                       X-Ray Chest AP Portable (Final result)  Result time 11/10/24 09:58:07   Procedure changed from X-Ray Chest PA And Lateral     Final result by Jesse Ahmadi DO (11/10/24 09:58:07)                   Impression:      No acute abnormality.      Electronically signed by: Jesse Ahmadi  Date:    11/10/2024  Time:    09:58               Narrative:    EXAMINATION:  XR CHEST AP PORTABLE    CLINICAL HISTORY:  cough; Cough,  unspecified    TECHNIQUE:  Single frontal view of the chest was performed.    COMPARISON:  10/27/2024.    FINDINGS:  There is a right-sided port, unchanged.  The lungs are well expanded and clear. No focal opacities are seen. The pleural spaces are clear. The cardiac silhouette is unremarkable. The visualized osseous structures are intact.                                       Medications   droPERidol injection 2.5 mg (2.5 mg Intravenous Given 11/10/24 0931)   HYDROmorphone injection 2 mg (2 mg Intravenous Given 11/10/24 0931)   HYDROmorphone injection 2 mg (2 mg Intravenous Given 11/10/24 1210)   promethazine (PHENERGAN) 12.5 mg in 0.9% NaCl 50 mL IVPB (0 mg Intravenous Stopped 11/10/24 1313)   HYDROmorphone injection 2 mg (2 mg Intravenous Given 11/10/24 1449)   iohexoL (OMNIPAQUE 350) injection 100 mL (100 mLs Intravenous Given 11/10/24 1446)   HYDROmorphone injection 2 mg (2 mg Intravenous Given 11/10/24 1608)   electrolytes-dextrose (Pedialyte) oral solution 400 mL (400 mLs Oral Given 11/10/24 1603)   heparin, porcine (PF) 100 unit/mL injection flush 500 Units (500 Units Intravenous Given 11/10/24 1650)     Medical Decision Making  Afebrile, atraumatic, hemodynamically stable female with no airway respiratory instability presenting with nausea multiple bouts of nonbloody nonbilious emesis in the last 12-24 hours.  She has a history of sickle cell disease on daily oxycodone.  She presented with chief complaint of chest pain, which she reports after multiple bouts of her dry heaving and emesis.  No abdominal distension, lack of flatulence and abdomen benign and non tympanic that would indicate small-bowel obstruction.  ECG with no STEMI that may warrant emergent PCR cardiology evaluation consistent with her lack of chest pain on exertion, dyspnea on exertion or decreased exercise tolerance.  No suspicion for cardiac etiology to her symptoms.  Despite nausea and emesis, she reports sore throat nasal congestion  rhinorrhea consistent with viral upper respiratory tract infection.  Lack of hypoxia decreases likelihood of COVID as etiology to her symptoms.  Lungs clear to auscultation bilaterally, which in the setting of multiple bouts of emesis, I suspect her chest pain in his not a acute chest crisis that may warrant emergent hematological evaluation/consultation, and likely GI in etiology.  Nonetheless, will perform retake and other labs including chest x-ray.  Will provide analgesics, antiemetics and closely monitor while in the emergency department.  ____________________  Nazario Vidales MD, [facility]  Emergency Medicine Staff  9:24 AM 11/10/2024      Amount and/or Complexity of Data Reviewed  Labs: ordered.  Radiology: ordered.    Risk  Prescription drug management.                                 F/U:  Patient treated and monitored in the emergency department.  She was provided analgesics and antiemetics, with complete resolution of symptoms for a period of time, but reoccurrence of symptoms while in the emergency department.  CT imaging ordered to assure no intra-abdominal pathology as the etiology to her symptoms.  Signed out to incoming team imaging, reassessment and ultimate disposition upon completion of my shift.  I do not anticipate any acute surgical pathology, or need of emergent exchange/transfusion or emergent hematological evaluation.  ____________________  Nazario Vidales MD, [facility]  Emergency Medicine Staff        Clinical Impression:  Final diagnoses:  [R07.89] Chest discomfort  [R05.9] Cough  [J02.9] Sore throat (Primary)  [R09.81, J34.89] Nasal congestion with rhinorrhea  [J06.9] Viral upper respiratory tract infection with cough  [Z86.2] History of sickle cell disease  [R11.2] Nausea and vomiting, unspecified vomiting type  [R10.13] Epigastric abdominal pain          ED Disposition Condition    Discharge           ED Prescriptions       Medication Sig Dispense Start Date End Date Auth. Provider    ondansetron  (ZOFRAN-ODT) 4 MG TbDL Take 1 tablet (4 mg total) by mouth every 8 (eight) hours as needed. 15 tablet 11/10/2024 11/15/2024 Luis Manuel Wright MD          Follow-up Information    None          Simon Vidales MD  11/11/24 0923

## 2024-11-10 NOTE — ED TRIAGE NOTES
Nazanin Malone, a 44 y.o. female presents to the ED w/ complaint of chest pain along with abdominal pain. Pt arrives to the ED via private vehicle with complaints of chest pain with an onset of yesterday. Pt states her chest pain is mid-sternal non radiating pain with a rating of a 10/10. Pt describes the pain as a burning aching pain. Pt also endorses bilateral upper quadrant abdominal pain with nausea and vomiting. Pt states onset of symptoms began early in the morning prior to arrival to the ED. Pt endorses multiple episodes of emesis.     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

## 2024-11-10 NOTE — DISCHARGE INSTRUCTIONS
You were seen in the Emergency Department today for abdominal pain, nausea, and vomiting, as well as sickle cell pain. We performed blood work, CT scan of the abdomen and pelvis all of which was reassuring and did not show any signs of an emergent cause of your symptoms.    We will discharge you home with a prescription for nausea medicine which she may take the ondansetron 4 mg up to every 6-8 hours as needed for nausea and vomiting.    Continue to take your other home medications as prescribed.    We recommend you follow up with the GI doctors for your continued abdominal pain, nausea, and vomiting.      Please follow up with your primary doctor in the next 3-5 days for a repeat evaluation and further management.    Please return to the Emergency Department immediately for any continued or worsening symptoms such as worsening pain, fevers, chills, trouble breathing, blood in your stool or vomit.

## 2024-11-10 NOTE — PROVIDER PROGRESS NOTES - EMERGENCY DEPT.
Encounter Date: 11/10/2024    ED Physician Progress Notes        Physician Note:   Patient care assumed from Dr. Vidales at shift change. Briefly, patient presents with abdominal pain, nausea, vomiting in the setting of sickle cell anemia. At time of handoff, we are pending CT results. Anticipate discharge home if CT results reassuring and pain managed.    I evaluated patient at 1558 after handoff. Reviewed CT results with her. No acute findings noted. She reports persistent sickle cell pain in low back and hips. Reviewed recent admission, patient required PCA for pain control. Was seen by GI for abdominal pain, who recommended outpatient follow up. Will try one more round of pain control here, and try for discharge if feeling improved. Patient agreeable with this plan, and is asking for something to drink now.    Patient ultimately feeling improved after another dose of pain medication and was able to tolerate p.o. without difficulty.  She felt comfortable with plan for discharge home and continued follow up in the outpatient setting for her sickle cell disease.    Strict return precautions were discussed, patient verbalized understanding and agreed with plan.  Patient discharged home.

## 2024-11-10 NOTE — ED NOTES
Pt complains of 10/10 pain after medication administration and would like something else for pain. MD notified.

## 2024-11-11 ENCOUNTER — HOSPITAL ENCOUNTER (INPATIENT)
Facility: HOSPITAL | Age: 44
LOS: 3 days | Discharge: HOME | DRG: 812 | End: 2024-11-15
Attending: EMERGENCY MEDICINE
Payer: MEDICARE

## 2024-11-11 DIAGNOSIS — R07.9 CHEST PAIN: ICD-10-CM

## 2024-11-11 DIAGNOSIS — R10.9 ABDOMINAL PAIN, UNSPECIFIED ABDOMINAL LOCATION: ICD-10-CM

## 2024-11-11 DIAGNOSIS — R19.7 DIARRHEA, UNSPECIFIED TYPE: ICD-10-CM

## 2024-11-11 DIAGNOSIS — D57.00 SICKLE CELL ANEMIA WITH CRISIS: ICD-10-CM

## 2024-11-11 DIAGNOSIS — D57.00 SICKLE CELL PAIN CRISIS: ICD-10-CM

## 2024-11-11 DIAGNOSIS — R11.0 NAUSEA: Primary | ICD-10-CM

## 2024-11-11 PROBLEM — E66.9 OBESITY (BMI 30-39.9): Status: ACTIVE | Noted: 2017-04-25

## 2024-11-11 LAB
ALBUMIN SERPL BCP-MCNC: 4 G/DL (ref 3.5–5.2)
ALLENS TEST: NORMAL
ALP SERPL-CCNC: 84 U/L (ref 40–150)
ALT SERPL W/O P-5'-P-CCNC: 20 U/L (ref 10–44)
ANION GAP SERPL CALC-SCNC: 13 MMOL/L (ref 8–16)
AST SERPL-CCNC: 31 U/L (ref 10–40)
BASOPHILS # BLD AUTO: 0.05 K/UL (ref 0–0.2)
BASOPHILS NFR BLD: 0.7 % (ref 0–1.9)
BILIRUB SERPL-MCNC: 1.2 MG/DL (ref 0.1–1)
BNP SERPL-MCNC: 39 PG/ML (ref 0–99)
BUN SERPL-MCNC: 9 MG/DL (ref 6–20)
C DIFF GDH STL QL: NEGATIVE
C DIFF TOX A+B STL QL IA: NEGATIVE
CALCIUM SERPL-MCNC: 10.2 MG/DL (ref 8.7–10.5)
CHLORIDE SERPL-SCNC: 105 MMOL/L (ref 95–110)
CO2 SERPL-SCNC: 20 MMOL/L (ref 23–29)
CREAT SERPL-MCNC: 1.1 MG/DL (ref 0.5–1.4)
DIFFERENTIAL METHOD BLD: ABNORMAL
EOSINOPHIL # BLD AUTO: 0 K/UL (ref 0–0.5)
EOSINOPHIL NFR BLD: 0.6 % (ref 0–8)
ERYTHROCYTE [DISTWIDTH] IN BLOOD BY AUTOMATED COUNT: 18.8 % (ref 11.5–14.5)
EST. GFR  (NO RACE VARIABLE): >60 ML/MIN/1.73 M^2
GLUCOSE SERPL-MCNC: 106 MG/DL (ref 70–110)
HCT VFR BLD AUTO: 27.2 % (ref 37–48.5)
HGB BLD-MCNC: 8.9 G/DL (ref 12–16)
IMM GRANULOCYTES # BLD AUTO: 0.02 K/UL (ref 0–0.04)
IMM GRANULOCYTES NFR BLD AUTO: 0.3 % (ref 0–0.5)
LDH SERPL L TO P-CCNC: 1.08 MMOL/L (ref 0.5–2.2)
LIPASE SERPL-CCNC: 44 U/L (ref 4–60)
LYMPHOCYTES # BLD AUTO: 1.6 K/UL (ref 1–4.8)
LYMPHOCYTES NFR BLD: 22.1 % (ref 18–48)
MCH RBC QN AUTO: 22 PG (ref 27–31)
MCHC RBC AUTO-ENTMCNC: 32.7 G/DL (ref 32–36)
MCV RBC AUTO: 67 FL (ref 82–98)
MONOCYTES # BLD AUTO: 0.4 K/UL (ref 0.3–1)
MONOCYTES NFR BLD: 6 % (ref 4–15)
NEUTROPHILS # BLD AUTO: 5 K/UL (ref 1.8–7.7)
NEUTROPHILS NFR BLD: 70.3 % (ref 38–73)
NRBC BLD-RTO: 2 /100 WBC
OHS QRS DURATION: 96 MS
OHS QTC CALCULATION: 467 MS
PLATELET # BLD AUTO: 581 K/UL (ref 150–450)
PMV BLD AUTO: 9.9 FL (ref 9.2–12.9)
POTASSIUM SERPL-SCNC: 3.3 MMOL/L (ref 3.5–5.1)
PROT SERPL-MCNC: 8.5 G/DL (ref 6–8.4)
RBC # BLD AUTO: 4.04 M/UL (ref 4–5.4)
RETICS/RBC NFR AUTO: 1.4 % (ref 0.5–2.5)
SAMPLE: NORMAL
SITE: NORMAL
SODIUM SERPL-SCNC: 138 MMOL/L (ref 136–145)
TROPONIN I SERPL DL<=0.01 NG/ML-MCNC: <0.006 NG/ML (ref 0–0.03)
TROPONIN I SERPL DL<=0.01 NG/ML-MCNC: <0.006 NG/ML (ref 0–0.03)
WBC # BLD AUTO: 7.12 K/UL (ref 3.9–12.7)
WBC #/AREA STL HPF: NORMAL /[HPF]

## 2024-11-11 PROCEDURE — 82705 FATS/LIPIDS FECES QUAL: CPT

## 2024-11-11 PROCEDURE — 82653 EL-1 FECAL QUANTITATIVE: CPT

## 2024-11-11 PROCEDURE — 89055 LEUKOCYTE ASSESSMENT FECAL: CPT

## 2024-11-11 PROCEDURE — 87425 ROTAVIRUS AG IA: CPT

## 2024-11-11 PROCEDURE — 25000003 PHARM REV CODE 250

## 2024-11-11 PROCEDURE — 63600175 PHARM REV CODE 636 W HCPCS

## 2024-11-11 PROCEDURE — 96374 THER/PROPH/DIAG INJ IV PUSH: CPT

## 2024-11-11 PROCEDURE — 87177 OVA AND PARASITES SMEARS: CPT

## 2024-11-11 PROCEDURE — 87046 STOOL CULTR AEROBIC BACT EA: CPT

## 2024-11-11 PROCEDURE — 96375 TX/PRO/DX INJ NEW DRUG ADDON: CPT

## 2024-11-11 PROCEDURE — G0378 HOSPITAL OBSERVATION PER HR: HCPCS

## 2024-11-11 PROCEDURE — 99900035 HC TECH TIME PER 15 MIN (STAT)

## 2024-11-11 PROCEDURE — 96361 HYDRATE IV INFUSION ADD-ON: CPT

## 2024-11-11 PROCEDURE — 85045 AUTOMATED RETICULOCYTE COUNT: CPT | Performed by: EMERGENCY MEDICINE

## 2024-11-11 PROCEDURE — 93005 ELECTROCARDIOGRAM TRACING: CPT

## 2024-11-11 PROCEDURE — 87449 NOS EACH ORGANISM AG IA: CPT | Mod: 91

## 2024-11-11 PROCEDURE — 85025 COMPLETE CBC W/AUTO DIFF WBC: CPT

## 2024-11-11 PROCEDURE — 96376 TX/PRO/DX INJ SAME DRUG ADON: CPT

## 2024-11-11 PROCEDURE — 87329 GIARDIA AG IA: CPT

## 2024-11-11 PROCEDURE — 87338 HPYLORI STOOL AG IA: CPT

## 2024-11-11 PROCEDURE — 84484 ASSAY OF TROPONIN QUANT: CPT

## 2024-11-11 PROCEDURE — 83880 ASSAY OF NATRIURETIC PEPTIDE: CPT

## 2024-11-11 PROCEDURE — 87449 NOS EACH ORGANISM AG IA: CPT

## 2024-11-11 PROCEDURE — 83993 ASSAY FOR CALPROTECTIN FECAL: CPT

## 2024-11-11 PROCEDURE — 80053 COMPREHEN METABOLIC PANEL: CPT

## 2024-11-11 PROCEDURE — 93010 ELECTROCARDIOGRAM REPORT: CPT | Mod: ,,, | Performed by: INTERNAL MEDICINE

## 2024-11-11 PROCEDURE — 82272 OCCULT BLD FECES 1-3 TESTS: CPT

## 2024-11-11 PROCEDURE — 83690 ASSAY OF LIPASE: CPT

## 2024-11-11 PROCEDURE — 99285 EMERGENCY DEPT VISIT HI MDM: CPT | Mod: 25

## 2024-11-11 PROCEDURE — 87045 FECES CULTURE AEROBIC BACT: CPT

## 2024-11-11 PROCEDURE — 87427 SHIGA-LIKE TOXIN AG IA: CPT

## 2024-11-11 PROCEDURE — 83605 ASSAY OF LACTIC ACID: CPT

## 2024-11-11 RX ORDER — IBUPROFEN 200 MG
16 TABLET ORAL
Status: DISCONTINUED | OUTPATIENT
Start: 2024-11-11 | End: 2024-11-15 | Stop reason: HOSPADM

## 2024-11-11 RX ORDER — AMOXICILLIN 250 MG
1 CAPSULE ORAL 2 TIMES DAILY
Status: DISCONTINUED | OUTPATIENT
Start: 2024-11-11 | End: 2024-11-11

## 2024-11-11 RX ORDER — FOLIC ACID 1 MG/1
1 TABLET ORAL DAILY
Status: DISCONTINUED | OUTPATIENT
Start: 2024-11-11 | End: 2024-11-15 | Stop reason: HOSPADM

## 2024-11-11 RX ORDER — TALC
6 POWDER (GRAM) TOPICAL NIGHTLY PRN
Status: DISCONTINUED | OUTPATIENT
Start: 2024-11-11 | End: 2024-11-15 | Stop reason: HOSPADM

## 2024-11-11 RX ORDER — MORPHINE SULFATE 30 MG/1
30 TABLET, FILM COATED, EXTENDED RELEASE ORAL EVERY 12 HOURS
Status: DISCONTINUED | OUTPATIENT
Start: 2024-11-11 | End: 2024-11-15 | Stop reason: HOSPADM

## 2024-11-11 RX ORDER — ONDANSETRON HYDROCHLORIDE 2 MG/ML
4 INJECTION, SOLUTION INTRAVENOUS
Status: COMPLETED | OUTPATIENT
Start: 2024-11-11 | End: 2024-11-11

## 2024-11-11 RX ORDER — DIPHENHYDRAMINE HYDROCHLORIDE 50 MG/ML
25 INJECTION INTRAMUSCULAR; INTRAVENOUS EVERY 4 HOURS PRN
Status: DISCONTINUED | OUTPATIENT
Start: 2024-11-11 | End: 2024-11-14

## 2024-11-11 RX ORDER — HYDROMORPHONE HYDROCHLORIDE 1 MG/ML
1 INJECTION, SOLUTION INTRAMUSCULAR; INTRAVENOUS; SUBCUTANEOUS
Status: COMPLETED | OUTPATIENT
Start: 2024-11-11 | End: 2024-11-11

## 2024-11-11 RX ORDER — ACETAMINOPHEN 500 MG
1000 TABLET ORAL 3 TIMES DAILY
Status: DISCONTINUED | OUTPATIENT
Start: 2024-11-11 | End: 2024-11-15 | Stop reason: HOSPADM

## 2024-11-11 RX ORDER — HYDROMORPHONE HYDROCHLORIDE 1 MG/ML
1 INJECTION, SOLUTION INTRAMUSCULAR; INTRAVENOUS; SUBCUTANEOUS
Status: DISCONTINUED | OUTPATIENT
Start: 2024-11-11 | End: 2024-11-12

## 2024-11-11 RX ORDER — AMLODIPINE BESYLATE 10 MG/1
10 TABLET ORAL DAILY
Status: DISCONTINUED | OUTPATIENT
Start: 2024-11-11 | End: 2024-11-15 | Stop reason: HOSPADM

## 2024-11-11 RX ORDER — ACETAMINOPHEN 325 MG/1
650 TABLET ORAL EVERY 4 HOURS PRN
Status: DISCONTINUED | OUTPATIENT
Start: 2024-11-11 | End: 2024-11-11

## 2024-11-11 RX ORDER — FLUOXETINE HYDROCHLORIDE 20 MG/1
40 CAPSULE ORAL DAILY
Status: DISCONTINUED | OUTPATIENT
Start: 2024-11-11 | End: 2024-11-15 | Stop reason: HOSPADM

## 2024-11-11 RX ORDER — GABAPENTIN 300 MG/1
600 CAPSULE ORAL 3 TIMES DAILY
Status: DISCONTINUED | OUTPATIENT
Start: 2024-11-11 | End: 2024-11-15 | Stop reason: HOSPADM

## 2024-11-11 RX ORDER — DOCUSATE SODIUM 100 MG
200 CAPSULE ORAL
Status: DISCONTINUED | OUTPATIENT
Start: 2024-11-11 | End: 2024-11-15 | Stop reason: HOSPADM

## 2024-11-11 RX ORDER — NALOXONE HCL 0.4 MG/ML
0.02 VIAL (ML) INJECTION
Status: DISCONTINUED | OUTPATIENT
Start: 2024-11-11 | End: 2024-11-15 | Stop reason: HOSPADM

## 2024-11-11 RX ORDER — IBUPROFEN 200 MG
24 TABLET ORAL
Status: DISCONTINUED | OUTPATIENT
Start: 2024-11-11 | End: 2024-11-15 | Stop reason: HOSPADM

## 2024-11-11 RX ORDER — HYDROMORPHONE HYDROCHLORIDE 1 MG/ML
2 INJECTION, SOLUTION INTRAMUSCULAR; INTRAVENOUS; SUBCUTANEOUS
Status: COMPLETED | OUTPATIENT
Start: 2024-11-11 | End: 2024-11-11

## 2024-11-11 RX ORDER — ONDANSETRON HYDROCHLORIDE 2 MG/ML
4 INJECTION, SOLUTION INTRAVENOUS
Status: DISCONTINUED | OUTPATIENT
Start: 2024-11-11 | End: 2024-11-11

## 2024-11-11 RX ORDER — POLYETHYLENE GLYCOL 3350 17 G/17G
17 POWDER, FOR SOLUTION ORAL DAILY PRN
Status: DISCONTINUED | OUTPATIENT
Start: 2024-11-11 | End: 2024-11-15 | Stop reason: HOSPADM

## 2024-11-11 RX ORDER — DICYCLOMINE HYDROCHLORIDE 10 MG/1
10 CAPSULE ORAL 4 TIMES DAILY
Status: DISCONTINUED | OUTPATIENT
Start: 2024-11-11 | End: 2024-11-15 | Stop reason: HOSPADM

## 2024-11-11 RX ORDER — ALPRAZOLAM 0.25 MG/1
0.25 TABLET ORAL 2 TIMES DAILY PRN
Status: DISCONTINUED | OUTPATIENT
Start: 2024-11-11 | End: 2024-11-15 | Stop reason: HOSPADM

## 2024-11-11 RX ORDER — POTASSIUM CHLORIDE 20 MEQ/1
40 TABLET, EXTENDED RELEASE ORAL ONCE
Status: COMPLETED | OUTPATIENT
Start: 2024-11-11 | End: 2024-11-11

## 2024-11-11 RX ORDER — FOLIC ACID 1 MG/1
1 TABLET ORAL DAILY
Status: DISCONTINUED | OUTPATIENT
Start: 2024-11-11 | End: 2024-11-11

## 2024-11-11 RX ORDER — ONDANSETRON 8 MG/1
8 TABLET, ORALLY DISINTEGRATING ORAL EVERY 8 HOURS PRN
Status: DISCONTINUED | OUTPATIENT
Start: 2024-11-11 | End: 2024-11-15 | Stop reason: HOSPADM

## 2024-11-11 RX ORDER — GLUCAGON 1 MG
1 KIT INJECTION
Status: DISCONTINUED | OUTPATIENT
Start: 2024-11-11 | End: 2024-11-15 | Stop reason: HOSPADM

## 2024-11-11 RX ORDER — ALUMINUM HYDROXIDE, MAGNESIUM HYDROXIDE, AND SIMETHICONE 1200; 120; 1200 MG/30ML; MG/30ML; MG/30ML
5 SUSPENSION ORAL
Status: COMPLETED | OUTPATIENT
Start: 2024-11-11 | End: 2024-11-11

## 2024-11-11 RX ORDER — ENOXAPARIN SODIUM 100 MG/ML
40 INJECTION SUBCUTANEOUS EVERY 24 HOURS
Status: DISCONTINUED | OUTPATIENT
Start: 2024-11-11 | End: 2024-11-11

## 2024-11-11 RX ORDER — PROCHLORPERAZINE EDISYLATE 5 MG/ML
5 INJECTION INTRAMUSCULAR; INTRAVENOUS EVERY 6 HOURS PRN
Status: DISCONTINUED | OUTPATIENT
Start: 2024-11-11 | End: 2024-11-15 | Stop reason: HOSPADM

## 2024-11-11 RX ORDER — PROCHLORPERAZINE EDISYLATE 5 MG/ML
5 INJECTION INTRAMUSCULAR; INTRAVENOUS
Status: COMPLETED | OUTPATIENT
Start: 2024-11-11 | End: 2024-11-11

## 2024-11-11 RX ORDER — TIZANIDINE 4 MG/1
4 TABLET ORAL EVERY 8 HOURS PRN
Status: DISCONTINUED | OUTPATIENT
Start: 2024-11-11 | End: 2024-11-13

## 2024-11-11 RX ORDER — ACETAMINOPHEN 500 MG
1000 TABLET ORAL EVERY 8 HOURS PRN
Status: DISCONTINUED | OUTPATIENT
Start: 2024-11-11 | End: 2024-11-11

## 2024-11-11 RX ORDER — ENOXAPARIN SODIUM 100 MG/ML
100 INJECTION SUBCUTANEOUS EVERY 12 HOURS
Status: DISCONTINUED | OUTPATIENT
Start: 2024-11-11 | End: 2024-11-15 | Stop reason: HOSPADM

## 2024-11-11 RX ORDER — ASCORBIC ACID 250 MG
250 TABLET ORAL DAILY
Status: DISCONTINUED | OUTPATIENT
Start: 2024-11-11 | End: 2024-11-15 | Stop reason: HOSPADM

## 2024-11-11 RX ADMIN — HYDROMORPHONE HYDROCHLORIDE 1 MG: 1 INJECTION, SOLUTION INTRAMUSCULAR; INTRAVENOUS; SUBCUTANEOUS at 11:11

## 2024-11-11 RX ADMIN — FOLIC ACID 1 MG: 1 TABLET ORAL at 08:11

## 2024-11-11 RX ADMIN — ACETAMINOPHEN 1000 MG: 500 TABLET ORAL at 08:11

## 2024-11-11 RX ADMIN — Medication 200 ML: at 12:11

## 2024-11-11 RX ADMIN — Medication 200 ML: at 09:11

## 2024-11-11 RX ADMIN — ACETAMINOPHEN 1000 MG: 500 TABLET ORAL at 02:11

## 2024-11-11 RX ADMIN — HYDROMORPHONE HYDROCHLORIDE 1 MG: 1 INJECTION, SOLUTION INTRAMUSCULAR; INTRAVENOUS; SUBCUTANEOUS at 08:11

## 2024-11-11 RX ADMIN — DICYCLOMINE HYDROCHLORIDE 10 MG: 10 CAPSULE ORAL at 08:11

## 2024-11-11 RX ADMIN — OXYCODONE 15 MG: 5 TABLET ORAL at 05:11

## 2024-11-11 RX ADMIN — GABAPENTIN 600 MG: 300 CAPSULE ORAL at 08:11

## 2024-11-11 RX ADMIN — DICYCLOMINE HYDROCHLORIDE 10 MG: 10 CAPSULE ORAL at 05:11

## 2024-11-11 RX ADMIN — FLUOXETINE HYDROCHLORIDE 40 MG: 20 CAPSULE ORAL at 08:11

## 2024-11-11 RX ADMIN — HYDROMORPHONE HYDROCHLORIDE 1 MG: 1 INJECTION, SOLUTION INTRAMUSCULAR; INTRAVENOUS; SUBCUTANEOUS at 09:11

## 2024-11-11 RX ADMIN — HYDROMORPHONE HYDROCHLORIDE 2 MG: 1 INJECTION, SOLUTION INTRAMUSCULAR; INTRAVENOUS; SUBCUTANEOUS at 04:11

## 2024-11-11 RX ADMIN — MORPHINE SULFATE 30 MG: 30 TABLET, FILM COATED, EXTENDED RELEASE ORAL at 09:11

## 2024-11-11 RX ADMIN — DICYCLOMINE HYDROCHLORIDE 10 MG: 10 CAPSULE ORAL at 09:11

## 2024-11-11 RX ADMIN — HYDROMORPHONE HYDROCHLORIDE 1 MG: 1 INJECTION, SOLUTION INTRAMUSCULAR; INTRAVENOUS; SUBCUTANEOUS at 02:11

## 2024-11-11 RX ADMIN — SODIUM CHLORIDE 1000 ML: 9 INJECTION, SOLUTION INTRAVENOUS at 04:11

## 2024-11-11 RX ADMIN — AMLODIPINE BESYLATE 10 MG: 10 TABLET ORAL at 08:11

## 2024-11-11 RX ADMIN — POTASSIUM CHLORIDE 40 MEQ: 1500 TABLET, EXTENDED RELEASE ORAL at 08:11

## 2024-11-11 RX ADMIN — ONDANSETRON 8 MG: 8 TABLET, ORALLY DISINTEGRATING ORAL at 11:11

## 2024-11-11 RX ADMIN — MORPHINE SULFATE 30 MG: 30 TABLET, FILM COATED, EXTENDED RELEASE ORAL at 08:11

## 2024-11-11 RX ADMIN — HYDROMORPHONE HYDROCHLORIDE 1 MG: 1 INJECTION, SOLUTION INTRAMUSCULAR; INTRAVENOUS; SUBCUTANEOUS at 06:11

## 2024-11-11 RX ADMIN — GABAPENTIN 600 MG: 300 CAPSULE ORAL at 02:11

## 2024-11-11 RX ADMIN — TIZANIDINE 4 MG: 4 TABLET ORAL at 08:11

## 2024-11-11 RX ADMIN — Medication 250 MG: at 08:11

## 2024-11-11 RX ADMIN — ACETAMINOPHEN 1000 MG: 500 TABLET ORAL at 09:11

## 2024-11-11 RX ADMIN — ONDANSETRON 4 MG: 2 INJECTION INTRAMUSCULAR; INTRAVENOUS at 06:11

## 2024-11-11 RX ADMIN — Medication 200 ML: at 05:11

## 2024-11-11 RX ADMIN — GABAPENTIN 600 MG: 300 CAPSULE ORAL at 09:11

## 2024-11-11 RX ADMIN — ALUMINUM HYDROXIDE, MAGNESIUM HYDROXIDE, AND SIMETHICONE 5 ML: 200; 200; 20 SUSPENSION ORAL at 04:11

## 2024-11-11 RX ADMIN — PROCHLORPERAZINE EDISYLATE 5 MG: 5 INJECTION INTRAMUSCULAR; INTRAVENOUS at 04:11

## 2024-11-11 RX ADMIN — DICYCLOMINE HYDROCHLORIDE 10 MG: 10 CAPSULE ORAL at 12:11

## 2024-11-11 RX ADMIN — OXYCODONE 15 MG: 5 TABLET ORAL at 01:11

## 2024-11-11 NOTE — ASSESSMENT & PLAN NOTE
Diarrhea  - complaints of abd pain, n/v/d x2 d   - currently AFVSS, no leukocytosis  - mildly hypokalemic (replacing)   - stool studies pending   - had CT abd/pelvis done yesterday during previous ED presentation: numerous subcutaneous nodules along the anterior abdominal wall which have continued to increase in size and surrounding stranding across recent priors, likely sequela of serial injections however correlation for cellulitis advised, similar appearing fullness in the region of the gastric body, correlation with EGD advised  - dominant symptom now diarrhea, encourage oral hydration with Pedialyte to prevent dehydration  - scheduled bentyl   - prn analgesics  - prn antiemetics   - can consider imodium after stool studies collected/resulted

## 2024-11-11 NOTE — ASSESSMENT & PLAN NOTE
Patient's most recent potassium results are listed below.   Recent Labs     11/10/24  0930 11/11/24  0427   K 3.5 3.3*     Plan  - Replete potassium per protocol  - Monitor potassium Daily  - Patient's hypokalemia is stable

## 2024-11-11 NOTE — ED PROVIDER NOTES
Encounter Date: 2024       History     Chief Complaint   Patient presents with    Abdominal Pain     Pt has c/o abd pain, N/V/D. Diarrhea x 3 episodes. Pt reports d/c'd yesterday for same complaint states pain worse.      HPI  44-year-old female history of sickle cell disease presents for abdominal pain and diarrhea.  Patient states she is having tinnitus 10 pain in the abdomen and chest.  Denies radiation to the back.  States that this is her typical sickle cell pain.  Denies vomiting however is endorsing extreme nausea.  Denies Fevers or chills.    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    Asthma     Depression     Hypertension     Morbid obesity     Opioid dependence 2017    Pneumonia due to Streptococcus pneumoniae 2017    Right lower lobe pneumonia 2017    Sepsis due to Streptococcus pneumoniae 2017    Sickle cell-beta thalassemia disease with pain     Trigeminal neuralgia      Past Surgical History:   Procedure Laterality Date     SECTION      TONSILLECTOMY      TUBAL LIGATION       Family History   Problem Relation Name Age of Onset    Heart disease Mother      Diabetes Mother      Heart disease Father      Breast cancer Neg Hx      Ovarian cancer Neg Hx      Colon cancer Neg Hx       Social History     Tobacco Use    Smoking status: Never    Smokeless tobacco: Never   Substance Use Topics    Alcohol use: No    Drug use: Yes     Types: Marijuana     Comment: periodically      Review of Systems    Physical Exam     Initial Vitals [24 0312]   BP Pulse Resp Temp SpO2   (!) 200/90 89 20 99.8 °F (37.7 °C) 99 %      MAP       --         Physical  Exam    Nursing note and vitals reviewed.  Constitutional: She appears well-developed and well-nourished.   HENT:   Head: Normocephalic and atraumatic.   Neck: Neck supple. No tracheal deviation present. No JVD present.   Cardiovascular:  Normal rate, regular rhythm, normal heart sounds and intact distal pulses.     Exam reveals no gallop and no friction rub.       No murmur heard.  Pulmonary/Chest: Breath sounds normal. No stridor. No respiratory distress. She has no wheezes. She has no rhonchi. She has no rales. She exhibits no tenderness.   Abdominal: Abdomen is soft. She exhibits no distension and no mass. There is no abdominal tenderness. There is no rebound and no guarding.   Musculoskeletal:         General: No tenderness or edema. Normal range of motion.      Cervical back: Neck supple.     Lymphadenopathy:     She has no cervical adenopathy.   Neurological: She is alert and oriented to person, place, and time. GCS score is 15. GCS eye subscore is 4. GCS verbal subscore is 5. GCS motor subscore is 6.   Skin: Skin is warm and dry. Capillary refill takes less than 2 seconds.   Psychiatric: She has a normal mood and affect. Her behavior is normal. Judgment and thought content normal.         ED Course   Procedures  Labs Reviewed   CBC W/ AUTO DIFFERENTIAL - Abnormal       Result Value    WBC 7.12      RBC 4.04      Hemoglobin 8.9 (*)     Hematocrit 27.2 (*)     MCV 67 (*)     MCH 22.0 (*)     MCHC 32.7      RDW 18.8 (*)     Platelets 581 (*)     MPV 9.9      Immature Granulocytes 0.3      Gran # (ANC) 5.0      Immature Grans (Abs) 0.02      Lymph # 1.6      Mono # 0.4      Eos # 0.0      Baso # 0.05      nRBC 2 (*)     Gran % 70.3      Lymph % 22.1      Mono % 6.0      Eosinophil % 0.6      Basophil % 0.7      Differential Method Automated     COMPREHENSIVE METABOLIC PANEL - Abnormal    Sodium 138      Potassium 3.3 (*)     Chloride 105      CO2 20 (*)     Glucose 106      BUN 9      Creatinine 1.1      Calcium  10.2      Total Protein 8.5 (*)     Albumin 4.0      Total Bilirubin 1.2 (*)     Alkaline Phosphatase 84      AST 31      ALT 20      eGFR >60.0      Anion Gap 13     CLOSTRIDIUM DIFFICILE   CULTURE, STOOL   TROPONIN I    Troponin I <0.006     B-TYPE NATRIURETIC PEPTIDE    BNP 39     LIPASE    Lipase 44     TROPONIN I   H. PYLORI ANTIGEN, STOOL   PANCREATIC ELASTASE, FECAL   FECAL FAT, QUALITATIVE   OCCULT BLOOD X 1, STOOL   WBC, STOOL   ROTAVIRUS ANTIGEN, STOOL   CALPROTECTIN, STOOL   GIARDIA/CRYPTOSPORIDIUM (EIA)   STOOL EXAM-OVA,CYSTS,PARASITES   RETICULOCYTES   ISTAT LACTATE    POC Lactate 1.08      Sample VENOUS      Site Other      Allens Test N/A            Imaging Results              X-Ray Chest AP Portable (Final result)  Result time 11/11/24 06:23:29      Final result by Keyur Mejia MD (11/11/24 06:23:29)                   Impression:      Allowing for a considerably poorer inspiratory depth level on the current examination, there has been no significant detrimental interval change in the appearance of the chest since 11/10/2024.      Electronically signed by: Keyur Mejia MD  Date:    11/11/2024  Time:    06:23               Narrative:    EXAMINATION:  XR CHEST AP PORTABLE    CLINICAL HISTORY:  Chest Pain;    TECHNIQUE:  One view    COMPARISON:  Comparison is made to 11/10/2024.    FINDINGS:  Vascular access catheter again noted, its tip at the junction of the superior vena cava and right atrium.  Cardiomediastinal silhouette is magnified by projection and by a poor inspiratory depth level, and allowing for these technical factors I doubt that the cardiomediastinal silhouette or pulmonary vascularity have changed appreciably since the examination referenced above.  Lung zones also appear essentially stable, with no significant superimposed airspace consolidation or volume loss evident.  No pleural fluid of any substantial volume is seen on either side.  No pneumothorax.                                        Medications   ondansetron injection 4 mg (has no administration in time range)   aluminum-magnesium hydroxide-simethicone 200-200-20 mg/5 mL suspension 5 mL (5 mLs Oral Given 11/11/24 0429)   HYDROmorphone injection 2 mg (2 mg Intravenous Given 11/11/24 0429)   prochlorperazine injection Soln 5 mg (5 mg Intravenous Given 11/11/24 0429)   sodium chloride 0.9% bolus 1,000 mL 1,000 mL (1,000 mLs Intravenous New Bag 11/11/24 0441)   HYDROmorphone injection 1 mg (1 mg Intravenous Given 11/11/24 0623)     Medical Decision Making                                44-year-old female presents with abdominal pain and chest pain.    Differential diagnosis for this patient includes but isn't limited to sickle cell pain crisis, acute chest, pancreatitis, gastroenteritis, food poisoning, pneumonia.    Chest x-ray negative for acute chest, independently interpreted no acute findings.  Likely due to normal chest x-ray.    Stool studies ordered in the emergency department due to patient's complaint of diarrhea.  Patient did not have diarrhea in the emergency department for collection.    Labs notable for hemoglobin that has at the patient's baseline, the decreased potassium to 3.3 without EKG changes, negative lactate making sepsis less likely.  Lipase negative making pancreatitis unlikely.    Patient given multiple doses of IV Dilaudid for pain control, and relief was difficult to achieve, patient was admitted for intractable pain is sickle cell pain crisis.  Patient was nausea controlled with Compazine and Zofran.  I discussed case with hospital medicine who agreed to admit the patient.        Clinical Impression:  Final diagnoses:  [R07.9] Chest pain  [R11.0] Nausea (Primary)  [D57.00] Sickle cell pain crisis          ED Disposition Condition    Observation Stable                Llanes, MD Eldon  Resident  11/11/24 [id number]

## 2024-11-11 NOTE — ASSESSMENT & PLAN NOTE
Patients blood pressure range in the last 24 hours was: BP  Min: 143/81  Max: 200/90.The patient's inpatient anti-hypertensive regimen is listed below:  Current Antihypertensives  amLODIPine tablet 10 mg, Daily, Oral    Plan  - BP is uncontrolled  - patient has not received morning meds and hypertensive   - recheck BP after meds and if still high, consider additional agent

## 2024-11-11 NOTE — ASSESSMENT & PLAN NOTE
44 y.o.F with hx of sickle cell presents for complaints of chest pain, abdominal pain, diarrhea, N/V (now improved)   - AF, hypertensive, otherwise VSS  - CXR negative for acute process and similar to previous study completed yesterday.  - Labs with Hb 8.9 (~ BL), Retic 1.4  - s/p dilaudid inj x2 in ED, zofran x1, compazine x1, 1L bolus in ED  - Start pain regimen - scheduled tylenol, oxycodone prn, dilaudid 1mg q3hrs prn, continue home gabapentin and MS contin  - Continue folic acid  - Monitor with daily CBC  - Senna-doc BID when diarrhea improves  - Benadryl and Zofran PRN  - Wean IV pain meds as tolerated to oral as pain diminishes  - Transfuse for Hgb <7, <10 if acute chest syndrome

## 2024-11-11 NOTE — CARE UPDATE
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    Last VS: BP (!) 170/111 (BP Location: Left arm, Patient Position: Lying)   Pulse (!) 264   Temp 99.2 °F (37.3 °C) (Oral)   Resp 16   Ht 5' 2" (1.575 m)   Wt 94.3 kg (207 lb 14.3 oz)   LMP  (LMP Unknown)   SpO2 (!) 78%   Breastfeeding No   BMI 38.02 kg/m²     24H Vital Sign Range:  Temp:  [98.7 °F (37.1 °C)-99.8 °F (37.7 °C)]   Pulse:  []   Resp:  [15-50]   BP: (143-200)/()   SpO2:  [78 %-100 %]     Level of Consciousness (AVPU): alert    Recent Labs     11/10/24  0930 11/10/24  0937 11/11/24  0427   WBC 7.23  --  7.12   HGB 9.1*  --  8.9*   HCT 26.7* 31* 27.2*   *  --  581*       Recent Labs     11/10/24  0930 11/11/24 0427    138   K 3.5 3.3*    105   CO2 20* 20*   BUN 10 9   CREATININE 1.1 1.1   * 106          OXYGEN:             MEWS score: 1    Bedside Mahad BLANCO contacted for erroneous vitals reports incorrect vitals. Once rechecked within normal range. No additional concerns verbalized at this time. Instructed to call [phone] for further concerns or assistance.    Adam Hanks RN        "

## 2024-11-11 NOTE — ASSESSMENT & PLAN NOTE
"Thrombosis of internal carotid, left   This patient has long term use on an anticoagulant with Select Anticoagulant(s): Enoxaparin. Their long term anticoagulation will be Held or Continued: continued. They are on long term anticoagulation due to Reason for Anticoagulation: Thrombosis of L internal carotid .   - patient states she was previously on oral anticoagulants and they "didn't work"   - recently discharged on 10/31/2024 on lovenox inj BID  - continue while inpatient  "

## 2024-11-11 NOTE — ED NOTES
Telemetry Verification   Patient placed on Telemetry Box  Verified with War Room  Box # [id number]   Monitor Tech elizabeth   Rate 66   Rhythm nsr

## 2024-11-11 NOTE — ED NOTES
Pt reports she was seen earlier for nausea and vomiting however she is now experiencing diarrhea. Pt endorses abdominal pain at this time 10/10.

## 2024-11-11 NOTE — HPI
Nazanin Malone is a 44 y.o.F with hx of sickle cell, anemia, hypertension, SAMUEL presents to [facility] ED with complaints of severe abd pain/chest pain and diarrhea x2d. Patient stated she was seen here yesterday for abdominal pain and n/v. She was given antiemetics and symptoms improved so she was discharged home. Reports that when she got home, her pain continued and she then began having diarrhea. Reports about 8 episodes since she was discharged. Endorsing chest pain that she attributes to vomiting so much prior. Also reports the abdominal pain and chest pain are consistent with prior pain crises. Denies fever, chills, chest pain, palpitations, SOB, cough, abdominal pain, n/v/d, dysuria, headaches, or any other symptoms at this time.     In ED: AFVSS. CBC with stable, chronic anemia. Retic 1.4. K 3.3, bicarb 20, otherwise CMP unremarkable. BNP 39, troponin negative x2. CXR with no change of the chest since 11/10 (no acute abnormality). Stool studies pending. S/p maalox, dialudid inj x2, zofran, compazine, 1L IVF bolus. Admitted to  for further evaluation.

## 2024-11-11 NOTE — H&P
[facility] Dept  [facility] Medicine  History & Physical    Patient Name: Nazanin Maolne  MRN: [id number]  Patient Class: OP- Observation  Admission Date: 11/11/2024  Attending Physician: Vijaya Acosta MD   Primary Care Provider: Pee Montgomery MD         Patient information was obtained from patient, past medical records, and ER records.     Subjective:     Principal Problem:Sickle cell anemia with crisis    Chief Complaint:   Chief Complaint   Patient presents with    Abdominal Pain     Pt has c/o abd pain, N/V/D. Diarrhea x 3 episodes. Pt reports d/c'd yesterday for same complaint states pain worse.         HPI: Nazanin Malone is a 44 y.o.F with hx of sickle cell, anemia, hypertension, SAMUEL [place] to [facility] [place] with complaints of severe abd pain/chest pain and diarrhea x2d. Patient stated she was seen here yesterday for abdominal pain and n/v. She was given antiemetics and symptoms improved so she was discharged home. Reports that when she got home, her pain continued and she then began having diarrhea. Reports about 8 episodes since she was discharged. Endorsing chest pain that she attributes to vomiting so much prior. Also reports the abdominal pain and chest pain are consistent with prior pain crises. Denies fever, chills, chest pain, palpitations, SOB, cough, abdominal pain, n/v/d, dysuria, headaches, or any other symptoms at this time.     In ED: AFVSS. CBC with stable, chronic anemia. Retic 1.4. K 3.3, bicarb 20, otherwise CMP unremarkable. BNP 39, troponin negative x2. CXR with no change of the chest since 11/10 (no acute abnormality). Stool studies pending. S/p maalox, dialudid inj x2, zofran, compazine, 1L IVF bolus. Admitted to  for further evaluation.     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    Asthma     Depression     Hypertension     Morbid obesity     Opioid  dependence 2017    Pneumonia due to Streptococcus pneumoniae 2017    Right lower lobe pneumonia 2017    Sepsis due to Streptococcus pneumoniae 2017    Sickle cell-beta thalassemia disease with pain     Trigeminal neuralgia        Past Surgical History:   Procedure Laterality Date     SECTION      TONSILLECTOMY      TUBAL LIGATION         Review of patient's allergies indicates:   Allergen Reactions    Cat dander Anaphylaxis, Itching and Shortness Of Breath    Nsaids (non-steroidal anti-inflammatory drug) Itching and Anaphylaxis    Latex Rash       Current Facility-Administered Medications on File Prior to Encounter   Medication    [COMPLETED] droPERidol injection 2.5 mg    [COMPLETED] electrolytes-dextrose (Pedialyte) oral solution 400 mL    [COMPLETED] heparin, porcine (PF) 100 unit/mL injection flush 500 Units    [COMPLETED] HYDROmorphone injection 2 mg    [COMPLETED] HYDROmorphone injection 2 mg    [COMPLETED] HYDROmorphone injection 2 mg    [COMPLETED] HYDROmorphone injection 2 mg    [COMPLETED] iohexoL (OMNIPAQUE 350) injection 100 mL    [COMPLETED] promethazine (PHENERGAN) 12.5 mg in 0.9% NaCl 50 mL IVPB    [DISCONTINUED] heparin, porcine (PF) 100 unit/mL injection flush 500 Units    [DISCONTINUED] HYDROmorphone injection 2 mg    [DISCONTINUED] promethazine (PHENERGAN) 12.5 mg in 0.9% NaCl 50 mL IVPB     Current Outpatient Medications on File Prior to Encounter   Medication Sig    acetaminophen (TYLENOL) 500 MG tablet Take 1,000 mg by mouth every 8 (eight) hours as needed for Pain.    albuterol (VENTOLIN HFA) 90 mcg/actuation inhaler Inhale 2 puffs into the lungs every 6 (six) hours as needed for Wheezing or Shortness of Breath. Rescue    ALPRAZolam (XANAX) 0.25 MG tablet Take 1 tablet (0.25 mg total) by mouth 2 (two) times daily as needed for Anxiety.    amLODIPine (NORVASC) 10 MG tablet Take 1 tablet (10 mg total) by mouth once daily.    ascorbic acid (VITAMIN C ORAL) Take 1  capsule by mouth once daily.    enoxaparin (LOVENOX) 100 mg/mL Syrg Inject 1 mL (100 mg total) into the skin every 12 (twelve) hours.    FLUoxetine 40 MG capsule Take 1 capsule (40 mg total) by mouth once daily.    fluticasone propionate (FLONASE) 50 mcg/actuation nasal spray INSTILL 1 SPRAY INTO EACH NOSTRIL EVERY DAY    folic acid (FOLVITE) 1 MG tablet Take 1 tablet (1 mg total) by mouth once daily.    gabapentin (NEURONTIN) 300 MG capsule Take 2 capsules (600 mg total) by mouth 3 (three) times daily.    morphine (MS CONTIN) 30 MG 12 hr tablet Take 1 tablet (30 mg total) by mouth every 12 (twelve) hours. for 21 days    ondansetron (ZOFRAN-ODT) 4 MG TbDL Take 1 tablet (4 mg total) by mouth every 8 (eight) hours as needed.    [] oxyCODONE (ROXICODONE) 15 MG Tab Take 1 tablet (15 mg total) by mouth every 4 (four) hours as needed for Pain.    polyethylene glycol (GLYCOLAX) 17 gram/dose powder Use to cap to measure 17g, mix with liquid, and take by mouth once daily.    scopolamine (TRANSDERM-SCOP) 1.3-1.5 mg (1 mg over 3 days) Place 1 patch onto the skin Every 3 (three) days.    senna-docusate 8.6-50 mg (PERICOLACE) 8.6-50 mg per tablet Take 1 tablet by mouth daily as needed for Constipation.    [] tiZANidine (ZANAFLEX) 4 MG tablet Take 1 tablet (4 mg total) by mouth every 8 (eight) hours. for 10 days     Family History       Problem Relation (Age of Onset)    Diabetes Mother    Heart disease Mother, Father          Tobacco Use    Smoking status: Never    Smokeless tobacco: Never   Substance and Sexual Activity    Alcohol use: No    Drug use: Yes     Types: Marijuana     Comment: periodically     Sexual activity: Not Currently     Birth control/protection: See Surgical Hx     Review of Systems   Constitutional:  Positive for fatigue. Negative for activity change, chills and fever.   HENT:  Negative for trouble swallowing.    Eyes:  Negative for photophobia and visual disturbance.   Respiratory:  Positive  for shortness of breath. Negative for cough and chest tightness.    Cardiovascular:  Positive for chest pain. Negative for palpitations and leg swelling.   Gastrointestinal:  Positive for abdominal pain, diarrhea, nausea and vomiting. Negative for constipation.   Genitourinary:  Negative for dysuria, frequency and hematuria.   Musculoskeletal:  Negative for back pain, gait problem and neck pain.   Skin:  Negative for rash and wound.   Neurological:  Negative for dizziness, syncope, speech difficulty and light-headedness.   Psychiatric/Behavioral:  Negative for agitation and confusion. The patient is not nervous/anxious.      Objective:     Vital Signs (Most Recent):  Temp: 99.2 °F (37.3 °C) (11/11/24 0805)  Pulse: (!) 264 (11/11/24 0805)  Resp: 16 (11/11/24 0805)  BP: (!) 170/111 (11/11/24 0805)  SpO2: (!) 78 % (11/11/24 0805) Vital Signs (24h Range):  Temp:  [98.7 °F (37.1 °C)-99.8 °F (37.7 °C)] 99.2 °F (37.3 °C)  Pulse:  [] 264  Resp:  [15-50] 16  SpO2:  [78 %-100 %] 78 %  BP: (143-200)/() 170/111     Weight: 94.3 kg (207 lb 14.3 oz)  Body mass index is 38.02 kg/m².     Physical Exam  Vitals and nursing note reviewed.   Constitutional:       General: She is not in acute distress.     Appearance: She is well-developed.   HENT:      Head: Normocephalic and atraumatic.      Mouth/Throat:      Pharynx: No oropharyngeal exudate.   Eyes:      Extraocular Movements: Extraocular movements intact.      Conjunctiva/sclera: Conjunctivae normal.   Cardiovascular:      Rate and Rhythm: Normal rate and regular rhythm.      Heart sounds: Normal heart sounds.   Pulmonary:      Effort: Pulmonary effort is normal. No respiratory distress.      Breath sounds: Normal breath sounds. No wheezing.   Abdominal:      General: Bowel sounds are normal. There is no distension.      Palpations: Abdomen is soft.      Tenderness: There is abdominal tenderness (generalized, more dominant in suprapubic/umbilical region).    Musculoskeletal:         General: No tenderness. Normal range of motion.      Cervical back: Normal range of motion and neck supple.   Lymphadenopathy:      Cervical: No cervical adenopathy.   Skin:     General: Skin is warm and dry.      Capillary Refill: Capillary refill takes less than 2 seconds.      Findings: No rash.   Neurological:      Mental Status: She is alert and oriented to person, place, and time.      Cranial Nerves: No cranial nerve deficit.      Sensory: No sensory deficit.      Coordination: Coordination normal.   Psychiatric:         Behavior: Behavior normal.         Thought Content: Thought content normal.         Judgment: Judgment normal.                Significant Labs: All pertinent labs within the past 24 hours have been reviewed.    Bilirubin:   Recent Labs   Lab 10/29/24  0333 10/30/24  0322 10/31/24  0453 11/10/24  0930 11/11/24  0427   BILITOT 0.5 0.4 0.4 1.1* 1.2*     BMP:   Recent Labs   Lab 11/11/24 0427         K 3.3*      CO2 20*   BUN 9   CREATININE 1.1   CALCIUM 10.2     CBC:   Recent Labs   Lab 11/10/24  0930 11/10/24  0937 11/11/24 0427   WBC 7.23  --  7.12   HGB 9.1*  --  8.9*   HCT 26.7* 31* 27.2*   *  --  581*     CMP:   Recent Labs   Lab 11/10/24  0930 11/11/24  0427    138   K 3.5 3.3*    105   CO2 20* 20*   * 106   BUN 10 9   CREATININE 1.1 1.1   CALCIUM 10.2 10.2   PROT 8.6* 8.5*   ALBUMIN 3.9 4.0   BILITOT 1.1* 1.2*   ALKPHOS 90 84   AST 29 31   ALT 20 20   ANIONGAP 14 13     Cardiac Markers:   Recent Labs   Lab 11/11/24 0427   BNP 39     Lipase:   Recent Labs   Lab 11/10/24  0930 11/11/24  0427   LIPASE 25 44     Troponin:   Recent Labs   Lab 11/10/24  0930 11/11/24  0427 11/11/24  0647   TROPONINI <0.006 <0.006 <0.006     TSH:   Recent Labs   Lab 06/26/24  1345   TSH 0.173*       Significant Imaging: I have reviewed all pertinent imaging results/findings within the past 24 hours.  Imaging Results              X-Ray  Chest AP Portable (Final result)  Result time 11/11/24 06:23:29      Final result by Keyur Mejia MD (11/11/24 06:23:29)                   Impression:      Allowing for a considerably poorer inspiratory depth level on the current examination, there has been no significant detrimental interval change in the appearance of the chest since 11/10/2024.      Electronically signed by: Keyur Mejia MD  Date:    11/11/2024  Time:    06:23               Narrative:    EXAMINATION:  XR CHEST AP PORTABLE    CLINICAL HISTORY:  Chest Pain;    TECHNIQUE:  One view    COMPARISON:  Comparison is made to 11/10/2024.    FINDINGS:  Vascular access catheter again noted, its tip at the junction of the superior vena cava and right atrium.  Cardiomediastinal silhouette is magnified by projection and by a poor inspiratory depth level, and allowing for these technical factors I doubt that the cardiomediastinal silhouette or pulmonary vascularity have changed appreciably since the examination referenced above.  Lung zones also appear essentially stable, with no significant superimposed airspace consolidation or volume loss evident.  No pleural fluid of any substantial volume is seen on either side.  No pneumothorax.                                      Assessment/Plan:     * Sickle cell anemia with crisis  44 y.o.F with hx of sickle cell presents for complaints of chest pain, abdominal pain, diarrhea, N/V (now improved)   - AF, hypertensive, otherwise VSS  - CXR negative for acute process and similar to previous study completed yesterday.  - Labs with Hb 8.9 (~ BL), Retic 1.4  - s/p dilaudid inj x2 in ED, zofran x1, compazine x1, 1L bolus in ED  - Start pain regimen - scheduled tylenol, oxycodone prn, dilaudid 1mg q3hrs prn, continue home gabapentin and MS contin  - Continue folic acid  - Monitor with daily CBC  - Senna-doc BID when diarrhea improves  - Benadryl and Zofran PRN  - Wean IV pain meds as tolerated to oral as pain diminishes  -  "Transfuse for Hgb <7, <10 if acute chest syndrome    Abdominal pain  Diarrhea  - complaints of abd pain, n/v/d x2 d   - currently AFVSS, no leukocytosis  - mildly hypokalemic (replacing)   - stool studies pending   - had CT abd/pelvis done yesterday during previous ED presentation: numerous subcutaneous nodules along the anterior abdominal wall which have continued to increase in size and surrounding stranding across recent priors, likely sequela of serial injections however correlation for cellulitis advised, similar appearing fullness in the region of the gastric body, correlation with EGD advised  - dominant symptom now diarrhea, encourage oral hydration with Pedialyte to prevent dehydration  - scheduled bentyl   - prn analgesics  - prn antiemetics   - can consider imodium after stool studies collected/resulted    Long term (current) use of anticoagulants  Thrombosis of internal carotid, left   This patient has long term use on an anticoagulant with Select Anticoagulant(s): Enoxaparin. Their long term anticoagulation will be Held or Continued: continued. They are on long term anticoagulation due to Reason for Anticoagulation: Thrombosis of L internal carotid .   - patient states she was previously on oral anticoagulants and they "didn't work"   - recently discharged on 10/31/2024 on lovenox inj BID  - continue while inpatient    Hypokalemia  Patient's most recent potassium results are listed below.   Recent Labs     11/10/24  0930 11/11/24  0427   K 3.5 3.3*     Plan  - Replete potassium per protocol  - Monitor potassium Daily  - Patient's hypokalemia is stable    Anxiety  - continue home fluoxetine  - continue home prn xanax      Hypertension  Patients blood pressure range in the last 24 hours was: BP  Min: 143/81  Max: 200/90.The patient's inpatient anti-hypertensive regimen is listed below:  Current Antihypertensives  amLODIPine tablet 10 mg, Daily, Oral    Plan  - BP is uncontrolled  - patient has not " received morning meds and hypertensive   - recheck BP after meds and if still high, consider additional agent    Iron deficiency anemia  Anemia is likely due to Iron deficiency and chronic disease due to sickle cell . Most recent hemoglobin and hematocrit are listed below.  Recent Labs     11/10/24  0930 11/10/24  0937 11/11/24  0427   HGB 9.1*  --  8.9*   HCT 26.7* 31* 27.2*     Plan  - Monitor serial CBC: Daily  - Transfuse PRBC if patient becomes hemodynamically unstable, symptomatic or H/H drops below 7/21.  - Patient has not received any PRBC transfusions to date  - Patient's anemia is currently stable      VTE Risk Mitigation (From admission, onward)           Ordered     enoxaparin injection 100 mg  Every 12 hours         11/11/24 0808     Place sequential compression device  Until discontinued         11/11/24 0738     IP VTE HIGH RISK PATIENT  Once         11/11/24 0738     Place sequential compression device  Until discontinued         11/11/24 0738                         On 11/11/2024, patient should be placed in hospital observation services under my care in collaboration with Dr. Vijaya Acosta.           Adry Santoyo PA-C  Department of Hospital Medicine  [facility] - Emergency Dept

## 2024-11-11 NOTE — SUBJECTIVE & OBJECTIVE
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    Asthma     Depression     Hypertension     Morbid obesity     Opioid dependence 2017    Pneumonia due to Streptococcus pneumoniae 2017    Right lower lobe pneumonia 2017    Sepsis due to Streptococcus pneumoniae 2017    Sickle cell-beta thalassemia disease with pain     Trigeminal neuralgia        Past Surgical History:   Procedure Laterality Date     SECTION      TONSILLECTOMY      TUBAL LIGATION         Review of patient's allergies indicates:   Allergen Reactions    Cat dander Anaphylaxis, Itching and Shortness Of Breath    Nsaids (non-steroidal anti-inflammatory drug) Itching and Anaphylaxis    Latex Rash       Current Facility-Administered Medications on File Prior to Encounter   Medication    [COMPLETED] droPERidol injection 2.5 mg    [COMPLETED] electrolytes-dextrose (Pedialyte) oral solution 400 mL    [COMPLETED] heparin, porcine (PF) 100 unit/mL injection flush 500 Units    [COMPLETED] HYDROmorphone injection 2 mg    [COMPLETED] HYDROmorphone injection 2 mg    [COMPLETED] HYDROmorphone injection 2 mg    [COMPLETED] HYDROmorphone injection 2 mg    [COMPLETED] iohexoL (OMNIPAQUE 350) injection 100 mL    [COMPLETED] promethazine (PHENERGAN) 12.5 mg in 0.9% NaCl 50 mL IVPB    [DISCONTINUED] heparin, porcine (PF) 100 unit/mL injection flush 500 Units    [DISCONTINUED] HYDROmorphone injection 2 mg    [DISCONTINUED] promethazine (PHENERGAN) 12.5 mg in 0.9% NaCl 50 mL IVPB     Current Outpatient Medications on File Prior to Encounter   Medication Sig    acetaminophen (TYLENOL) 500 MG tablet Take 1,000 mg by mouth every 8 (eight) hours as needed for Pain.    albuterol (VENTOLIN HFA) 90 mcg/actuation inhaler Inhale 2 puffs into the lungs every 6 (six) hours as needed for Wheezing or Shortness of Breath. Rescue    ALPRAZolam (XANAX) 0.25  MG tablet Take 1 tablet (0.25 mg total) by mouth 2 (two) times daily as needed for Anxiety.    amLODIPine (NORVASC) 10 MG tablet Take 1 tablet (10 mg total) by mouth once daily.    ascorbic acid (VITAMIN C ORAL) Take 1 capsule by mouth once daily.    enoxaparin (LOVENOX) 100 mg/mL Syrg Inject 1 mL (100 mg total) into the skin every 12 (twelve) hours.    FLUoxetine 40 MG capsule Take 1 capsule (40 mg total) by mouth once daily.    fluticasone propionate (FLONASE) 50 mcg/actuation nasal spray INSTILL 1 SPRAY INTO EACH NOSTRIL EVERY DAY    folic acid (FOLVITE) 1 MG tablet Take 1 tablet (1 mg total) by mouth once daily.    gabapentin (NEURONTIN) 300 MG capsule Take 2 capsules (600 mg total) by mouth 3 (three) times daily.    morphine (MS CONTIN) 30 MG 12 hr tablet Take 1 tablet (30 mg total) by mouth every 12 (twelve) hours. for 21 days    ondansetron (ZOFRAN-ODT) 4 MG TbDL Take 1 tablet (4 mg total) by mouth every 8 (eight) hours as needed.    [] oxyCODONE (ROXICODONE) 15 MG Tab Take 1 tablet (15 mg total) by mouth every 4 (four) hours as needed for Pain.    polyethylene glycol (GLYCOLAX) 17 gram/dose powder Use to cap to measure 17g, mix with liquid, and take by mouth once daily.    scopolamine (TRANSDERM-SCOP) 1.3-1.5 mg (1 mg over 3 days) Place 1 patch onto the skin Every 3 (three) days.    senna-docusate 8.6-50 mg (PERICOLACE) 8.6-50 mg per tablet Take 1 tablet by mouth daily as needed for Constipation.    [] tiZANidine (ZANAFLEX) 4 MG tablet Take 1 tablet (4 mg total) by mouth every 8 (eight) hours. for 10 days     Family History       Problem Relation (Age of Onset)    Diabetes Mother    Heart disease Mother, Father          Tobacco Use    Smoking status: Never    Smokeless tobacco: Never   Substance and Sexual Activity    Alcohol use: No    Drug use: Yes     Types: Marijuana     Comment: periodically     Sexual activity: Not Currently     Birth control/protection: See Surgical Hx     Review of  Systems   Constitutional:  Positive for fatigue. Negative for activity change, chills and fever.   HENT:  Negative for trouble swallowing.    Eyes:  Negative for photophobia and visual disturbance.   Respiratory:  Positive for shortness of breath. Negative for cough and chest tightness.    Cardiovascular:  Positive for chest pain. Negative for palpitations and leg swelling.   Gastrointestinal:  Positive for abdominal pain, diarrhea, nausea and vomiting. Negative for constipation.   Genitourinary:  Negative for dysuria, frequency and hematuria.   Musculoskeletal:  Negative for back pain, gait problem and neck pain.   Skin:  Negative for rash and wound.   Neurological:  Negative for dizziness, syncope, speech difficulty and light-headedness.   Psychiatric/Behavioral:  Negative for agitation and confusion. The patient is not nervous/anxious.      Objective:     Vital Signs (Most Recent):  Temp: 99.2 °F (37.3 °C) (11/11/24 0805)  Pulse: (!) 264 (11/11/24 0805)  Resp: 16 (11/11/24 0805)  BP: (!) 170/111 (11/11/24 0805)  SpO2: (!) 78 % (11/11/24 0805) Vital Signs (24h Range):  Temp:  [98.7 °F (37.1 °C)-99.8 °F (37.7 °C)] 99.2 °F (37.3 °C)  Pulse:  [] 264  Resp:  [15-50] 16  SpO2:  [78 %-100 %] 78 %  BP: (143-200)/() 170/111     Weight: 94.3 kg (207 lb 14.3 oz)  Body mass index is 38.02 kg/m².     Physical Exam  Vitals and nursing note reviewed.   Constitutional:       General: She is not in acute distress.     Appearance: She is well-developed.   HENT:      Head: Normocephalic and atraumatic.      Mouth/Throat:      Pharynx: No oropharyngeal exudate.   Eyes:      Extraocular Movements: Extraocular movements intact.      Conjunctiva/sclera: Conjunctivae normal.   Cardiovascular:      Rate and Rhythm: Normal rate and regular rhythm.      Heart sounds: Normal heart sounds.   Pulmonary:      Effort: Pulmonary effort is normal. No respiratory distress.      Breath sounds: Normal breath sounds. No wheezing.    Abdominal:      General: Bowel sounds are normal. There is no distension.      Palpations: Abdomen is soft.      Tenderness: There is abdominal tenderness (generalized, more dominant in suprapubic/umbilical region).   Musculoskeletal:         General: No tenderness. Normal range of motion.      Cervical back: Normal range of motion and neck supple.   Lymphadenopathy:      Cervical: No cervical adenopathy.   Skin:     General: Skin is warm and dry.      Capillary Refill: Capillary refill takes less than 2 seconds.      Findings: No rash.   Neurological:      Mental Status: She is alert and oriented to person, place, and time.      Cranial Nerves: No cranial nerve deficit.      Sensory: No sensory deficit.      Coordination: Coordination normal.   Psychiatric:         Behavior: Behavior normal.         Thought Content: Thought content normal.         Judgment: Judgment normal.                Significant Labs: All pertinent labs within the past 24 hours have been reviewed.    Bilirubin:   Recent Labs   Lab 10/29/24  0333 10/30/24  0322 10/31/24  0453 11/10/24  0930 11/11/24  0427   BILITOT 0.5 0.4 0.4 1.1* 1.2*     BMP:   Recent Labs   Lab 11/11/24 0427         K 3.3*      CO2 20*   BUN 9   CREATININE 1.1   CALCIUM 10.2     CBC:   Recent Labs   Lab 11/10/24  0930 11/10/24  0937 11/11/24 0427   WBC 7.23  --  7.12   HGB 9.1*  --  8.9*   HCT 26.7* 31* 27.2*   *  --  581*     CMP:   Recent Labs   Lab 11/10/24  0930 11/11/24  0427    138   K 3.5 3.3*    105   CO2 20* 20*   * 106   BUN 10 9   CREATININE 1.1 1.1   CALCIUM 10.2 10.2   PROT 8.6* 8.5*   ALBUMIN 3.9 4.0   BILITOT 1.1* 1.2*   ALKPHOS 90 84   AST 29 31   ALT 20 20   ANIONGAP 14 13     Cardiac Markers:   Recent Labs   Lab 11/11/24 0427   BNP 39     Lipase:   Recent Labs   Lab 11/10/24  0930 11/11/24  0427   LIPASE 25 44     Troponin:   Recent Labs   Lab 11/10/24  0930 11/11/24  0427 11/11/24  0647   TROPONINI <0.006  <0.006 <0.006     TSH:   Recent Labs   Lab 06/26/24  1345   TSH 0.173*       Significant Imaging: I have reviewed all pertinent imaging results/findings within the past 24 hours.  Imaging Results              X-Ray Chest AP Portable (Final result)  Result time 11/11/24 06:23:29      Final result by Keyur Mejia MD (11/11/24 06:23:29)                   Impression:      Allowing for a considerably poorer inspiratory depth level on the current examination, there has been no significant detrimental interval change in the appearance of the chest since 11/10/2024.      Electronically signed by: Keyur Mejia MD  Date:    11/11/2024  Time:    06:23               Narrative:    EXAMINATION:  XR CHEST AP PORTABLE    CLINICAL HISTORY:  Chest Pain;    TECHNIQUE:  One view    COMPARISON:  Comparison is made to 11/10/2024.    FINDINGS:  Vascular access catheter again noted, its tip at the junction of the superior vena cava and right atrium.  Cardiomediastinal silhouette is magnified by projection and by a poor inspiratory depth level, and allowing for these technical factors I doubt that the cardiomediastinal silhouette or pulmonary vascularity have changed appreciably since the examination referenced above.  Lung zones also appear essentially stable, with no significant superimposed airspace consolidation or volume loss evident.  No pleural fluid of any substantial volume is seen on either side.  No pneumothorax.

## 2024-11-11 NOTE — ASSESSMENT & PLAN NOTE
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 Most recent hemoglobin and hematocrit are listed below.  Recent Labs     11/10/24  0930 11/10/24  0937 11/11/24  0427   HGB 9.1*  --  8.9*   HCT 26.7* 31* 27.2*     Plan  - Monitor serial CBC: Daily  - Transfuse PRBC if patient becomes hemodynamically unstable, symptomatic or H/H drops below 7/21.  - Patient has not received any PRBC transfusions to date  - Patient's anemia is currently stable

## 2024-11-12 LAB
ALBUMIN SERPL BCP-MCNC: 3.5 G/DL (ref 3.5–5.2)
ALP SERPL-CCNC: 73 U/L (ref 40–150)
ALT SERPL W/O P-5'-P-CCNC: 28 U/L (ref 10–44)
ANION GAP SERPL CALC-SCNC: 8 MMOL/L (ref 8–16)
AST SERPL-CCNC: 51 U/L (ref 10–40)
BASOPHILS # BLD AUTO: 0.07 K/UL (ref 0–0.2)
BASOPHILS NFR BLD: 1 % (ref 0–1.9)
BILIRUB SERPL-MCNC: 0.7 MG/DL (ref 0.1–1)
BUN SERPL-MCNC: 10 MG/DL (ref 6–20)
CALCIUM SERPL-MCNC: 9.7 MG/DL (ref 8.7–10.5)
CHLORIDE SERPL-SCNC: 106 MMOL/L (ref 95–110)
CO2 SERPL-SCNC: 25 MMOL/L (ref 23–29)
CREAT SERPL-MCNC: 1.1 MG/DL (ref 0.5–1.4)
CRYPTOSP AG STL QL IA: NEGATIVE
DIFFERENTIAL METHOD BLD: ABNORMAL
E COLI SXT1 STL QL IA: NEGATIVE
E COLI SXT2 STL QL IA: NEGATIVE
EOSINOPHIL # BLD AUTO: 0.3 K/UL (ref 0–0.5)
EOSINOPHIL NFR BLD: 4.8 % (ref 0–8)
ERYTHROCYTE [DISTWIDTH] IN BLOOD BY AUTOMATED COUNT: 18.8 % (ref 11.5–14.5)
EST. GFR  (NO RACE VARIABLE): >60 ML/MIN/1.73 M^2
G LAMBLIA AG STL QL IA: NEGATIVE
GLUCOSE SERPL-MCNC: 68 MG/DL (ref 70–110)
HCT VFR BLD AUTO: 25.9 % (ref 37–48.5)
HGB BLD-MCNC: 8.3 G/DL (ref 12–16)
IMM GRANULOCYTES # BLD AUTO: 0.01 K/UL (ref 0–0.04)
IMM GRANULOCYTES NFR BLD AUTO: 0.1 % (ref 0–0.5)
LYMPHOCYTES # BLD AUTO: 3.3 K/UL (ref 1–4.8)
LYMPHOCYTES NFR BLD: 48.5 % (ref 18–48)
MAGNESIUM SERPL-MCNC: 2.1 MG/DL (ref 1.6–2.6)
MCH RBC QN AUTO: 22 PG (ref 27–31)
MCHC RBC AUTO-ENTMCNC: 32 G/DL (ref 32–36)
MCV RBC AUTO: 69 FL (ref 82–98)
MONOCYTES # BLD AUTO: 0.6 K/UL (ref 0.3–1)
MONOCYTES NFR BLD: 9.3 % (ref 4–15)
NEUTROPHILS # BLD AUTO: 2.5 K/UL (ref 1.8–7.7)
NEUTROPHILS NFR BLD: 36.3 % (ref 38–73)
NRBC BLD-RTO: 2 /100 WBC
OB PNL STL: NEGATIVE
PHOSPHATE SERPL-MCNC: 3.4 MG/DL (ref 2.7–4.5)
PLATELET # BLD AUTO: 513 K/UL (ref 150–450)
PMV BLD AUTO: 10.2 FL (ref 9.2–12.9)
POTASSIUM SERPL-SCNC: 3.9 MMOL/L (ref 3.5–5.1)
PROT SERPL-MCNC: 7.3 G/DL (ref 6–8.4)
RBC # BLD AUTO: 3.77 M/UL (ref 4–5.4)
RV AG STL QL IA.RAPID: NEGATIVE
SODIUM SERPL-SCNC: 139 MMOL/L (ref 136–145)
WBC # BLD AUTO: 6.81 K/UL (ref 3.9–12.7)

## 2024-11-12 PROCEDURE — 87040 BLOOD CULTURE FOR BACTERIA: CPT | Mod: 59

## 2024-11-12 PROCEDURE — 99221 1ST HOSP IP/OBS SF/LOW 40: CPT | Mod: GC,,, | Performed by: STUDENT IN AN ORGANIZED HEALTH CARE EDUCATION/TRAINING PROGRAM

## 2024-11-12 PROCEDURE — 83735 ASSAY OF MAGNESIUM: CPT

## 2024-11-12 PROCEDURE — 25000003 PHARM REV CODE 250

## 2024-11-12 PROCEDURE — 80053 COMPREHEN METABOLIC PANEL: CPT

## 2024-11-12 PROCEDURE — 85025 COMPLETE CBC W/AUTO DIFF WBC: CPT

## 2024-11-12 PROCEDURE — 96376 TX/PRO/DX INJ SAME DRUG ADON: CPT

## 2024-11-12 PROCEDURE — 21400001 HC TELEMETRY ROOM

## 2024-11-12 PROCEDURE — 63600175 PHARM REV CODE 636 W HCPCS

## 2024-11-12 PROCEDURE — 84100 ASSAY OF PHOSPHORUS: CPT

## 2024-11-12 RX ORDER — MUPIROCIN 20 MG/G
OINTMENT TOPICAL 2 TIMES DAILY
Status: DISCONTINUED | OUTPATIENT
Start: 2024-11-12 | End: 2024-11-15 | Stop reason: HOSPADM

## 2024-11-12 RX ORDER — HYDROMORPHONE HYDROCHLORIDE 1 MG/ML
1 INJECTION, SOLUTION INTRAMUSCULAR; INTRAVENOUS; SUBCUTANEOUS ONCE
Status: COMPLETED | OUTPATIENT
Start: 2024-11-12 | End: 2024-11-12

## 2024-11-12 RX ORDER — OXYCODONE HYDROCHLORIDE 10 MG/1
30 TABLET ORAL EVERY 4 HOURS PRN
Status: DISCONTINUED | OUTPATIENT
Start: 2024-11-12 | End: 2024-11-15 | Stop reason: HOSPADM

## 2024-11-12 RX ORDER — HYDROMORPHONE HYDROCHLORIDE 1 MG/ML
1 INJECTION, SOLUTION INTRAMUSCULAR; INTRAVENOUS; SUBCUTANEOUS
Status: DISCONTINUED | OUTPATIENT
Start: 2024-11-12 | End: 2024-11-15 | Stop reason: HOSPADM

## 2024-11-12 RX ADMIN — DICYCLOMINE HYDROCHLORIDE 10 MG: 10 CAPSULE ORAL at 12:11

## 2024-11-12 RX ADMIN — GABAPENTIN 600 MG: 300 CAPSULE ORAL at 03:11

## 2024-11-12 RX ADMIN — HYDROMORPHONE HYDROCHLORIDE 1 MG: 1 INJECTION, SOLUTION INTRAMUSCULAR; INTRAVENOUS; SUBCUTANEOUS at 10:11

## 2024-11-12 RX ADMIN — TIZANIDINE 4 MG: 4 TABLET ORAL at 09:11

## 2024-11-12 RX ADMIN — ONDANSETRON 8 MG: 8 TABLET, ORALLY DISINTEGRATING ORAL at 06:11

## 2024-11-12 RX ADMIN — OXYCODONE HYDROCHLORIDE 30 MG: 10 TABLET ORAL at 09:11

## 2024-11-12 RX ADMIN — MORPHINE SULFATE 30 MG: 30 TABLET, FILM COATED, EXTENDED RELEASE ORAL at 08:11

## 2024-11-12 RX ADMIN — DICYCLOMINE HYDROCHLORIDE 10 MG: 10 CAPSULE ORAL at 04:11

## 2024-11-12 RX ADMIN — GABAPENTIN 600 MG: 300 CAPSULE ORAL at 08:11

## 2024-11-12 RX ADMIN — HYDROMORPHONE HYDROCHLORIDE 1 MG: 1 INJECTION, SOLUTION INTRAMUSCULAR; INTRAVENOUS; SUBCUTANEOUS at 11:11

## 2024-11-12 RX ADMIN — MUPIROCIN: 20 OINTMENT TOPICAL at 12:11

## 2024-11-12 RX ADMIN — OXYCODONE HYDROCHLORIDE 30 MG: 10 TABLET ORAL at 04:11

## 2024-11-12 RX ADMIN — AMLODIPINE BESYLATE 10 MG: 10 TABLET ORAL at 08:11

## 2024-11-12 RX ADMIN — MORPHINE SULFATE 30 MG: 30 TABLET, FILM COATED, EXTENDED RELEASE ORAL at 09:11

## 2024-11-12 RX ADMIN — HYDROMORPHONE HYDROCHLORIDE 1 MG: 1 INJECTION, SOLUTION INTRAMUSCULAR; INTRAVENOUS; SUBCUTANEOUS at 06:11

## 2024-11-12 RX ADMIN — FOLIC ACID 1 MG: 1 TABLET ORAL at 08:11

## 2024-11-12 RX ADMIN — DICYCLOMINE HYDROCHLORIDE 10 MG: 10 CAPSULE ORAL at 08:11

## 2024-11-12 RX ADMIN — GABAPENTIN 600 MG: 300 CAPSULE ORAL at 09:11

## 2024-11-12 RX ADMIN — Medication 200 ML: at 04:11

## 2024-11-12 RX ADMIN — DICYCLOMINE HYDROCHLORIDE 10 MG: 10 CAPSULE ORAL at 09:11

## 2024-11-12 RX ADMIN — ACETAMINOPHEN 1000 MG: 500 TABLET ORAL at 09:11

## 2024-11-12 RX ADMIN — TIZANIDINE 4 MG: 4 TABLET ORAL at 11:11

## 2024-11-12 RX ADMIN — Medication 200 ML: at 06:11

## 2024-11-12 RX ADMIN — FLUOXETINE HYDROCHLORIDE 40 MG: 20 CAPSULE ORAL at 08:11

## 2024-11-12 RX ADMIN — HYDROMORPHONE HYDROCHLORIDE 1 MG: 1 INJECTION, SOLUTION INTRAMUSCULAR; INTRAVENOUS; SUBCUTANEOUS at 07:11

## 2024-11-12 RX ADMIN — OXYCODONE HYDROCHLORIDE 30 MG: 10 TABLET ORAL at 12:11

## 2024-11-12 RX ADMIN — ACETAMINOPHEN 1000 MG: 500 TABLET ORAL at 03:11

## 2024-11-12 RX ADMIN — HYDROMORPHONE HYDROCHLORIDE 1 MG: 1 INJECTION, SOLUTION INTRAMUSCULAR; INTRAVENOUS; SUBCUTANEOUS at 02:11

## 2024-11-12 RX ADMIN — Medication 200 ML: at 12:11

## 2024-11-12 RX ADMIN — HYDROMORPHONE HYDROCHLORIDE 1 MG: 1 INJECTION, SOLUTION INTRAMUSCULAR; INTRAVENOUS; SUBCUTANEOUS at 03:11

## 2024-11-12 RX ADMIN — Medication 250 MG: at 08:11

## 2024-11-12 RX ADMIN — MUPIROCIN: 20 OINTMENT TOPICAL at 09:11

## 2024-11-12 RX ADMIN — Medication 200 ML: at 02:11

## 2024-11-12 RX ADMIN — Medication 200 ML: at 08:11

## 2024-11-12 RX ADMIN — Medication 200 ML: at 09:11

## 2024-11-12 NOTE — ASSESSMENT & PLAN NOTE
Patient's most recent potassium results are listed below.   Recent Labs     11/10/24  0930 11/11/24  0427 11/12/24  0400   K 3.5 3.3* 3.9       Plan  - Replete potassium per protocol  - Monitor potassium Daily  - Patient's hypokalemia is stable

## 2024-11-12 NOTE — PLAN OF CARE
Problem: Adult Inpatient Plan of Care  Goal: Plan of Care Review  Outcome: Progressing  Goal: Patient-Specific Goal (Individualized)  Outcome: Progressing  Goal: Absence of Hospital-Acquired Illness or Injury  Outcome: Progressing  Goal: Optimal Comfort and Wellbeing  Outcome: Progressing  Goal: Readiness for Transition of Care  Outcome: Progressing     Problem: Pneumonia  Goal: Fluid Balance  Outcome: Progressing  Goal: Resolution of Infection Signs and Symptoms  Outcome: Progressing  Goal: Effective Oxygenation and Ventilation  Outcome: Progressing     Problem: Infection  Goal: Absence of Infection Signs and Symptoms  Outcome: Progressing    AAOx4, pain medication given, bed in lowest position, locked, side rails up x 2, and call light within reach.      never

## 2024-11-12 NOTE — ASSESSMENT & PLAN NOTE
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 Most recent hemoglobin and hematocrit are listed below.  Recent Labs     11/10/24  0930 11/10/24  0937 11/11/24  0427 11/12/24  0400   HGB 9.1*  --  8.9* 8.3*   HCT 26.7* 31* 27.2* 25.9*       Plan  - Monitor serial CBC: Daily  - Transfuse PRBC if patient becomes hemodynamically unstable, symptomatic or H/H drops below 7/21.  - Patient has not received any PRBC transfusions to date  - Patient's anemia is currently stable

## 2024-11-12 NOTE — SUBJECTIVE & OBJECTIVE
Interval History: Pt requesting uptitration of dilaudid. Added one time dose of dilaudid and increased PO regimen of oxy from 15mg (home dose) to 30mg. GI consulted for gastric soft tissue abnormality evaluation with EGD.    Review of Systems   Constitutional:  Negative for activity change, appetite change and fever.   Respiratory:  Negative for cough, shortness of breath and wheezing.    Cardiovascular:  Negative for chest pain and leg swelling.   Gastrointestinal:  Positive for abdominal pain, diarrhea, nausea and vomiting. Negative for constipation.   Skin:  Negative for rash.   Neurological:  Negative for headaches.     Objective:     Vital Signs (Most Recent):  Temp: 98.2 °F (36.8 °C) (11/12/24 1124)  Pulse: 65 (11/12/24 1507)  Resp: 18 (11/12/24 1622)  BP: (!) 148/91 (11/12/24 1124)  SpO2: 96 % (11/12/24 1124) Vital Signs (24h Range):  Temp:  [98.2 °F (36.8 °C)-98.8 °F (37.1 °C)] 98.2 °F (36.8 °C)  Pulse:  [62-79] 65  Resp:  [17-18] 18  SpO2:  [95 %-97 %] 96 %  BP: (117-148)/(63-91) 148/91     Weight: 94.3 kg (207 lb 14.3 oz)  Body mass index is 38.02 kg/m².    Intake/Output Summary (Last 24 hours) at 11/12/2024 1629  Last data filed at 11/12/2024 0611  Gross per 24 hour   Intake 980 ml   Output --   Net 980 ml         Physical Exam  Vitals and nursing note reviewed.   HENT:      Head: Normocephalic and atraumatic.   Eyes:      Extraocular Movements: Extraocular movements intact.      Conjunctiva/sclera: Conjunctivae normal.   Cardiovascular:      Rate and Rhythm: Normal rate and regular rhythm.      Pulses: Normal pulses.   Pulmonary:      Effort: Pulmonary effort is normal. No respiratory distress.      Breath sounds: No wheezing or rales.   Abdominal:      Palpations: Abdomen is soft.      Tenderness: There is abdominal tenderness (epigastric). There is no guarding.   Musculoskeletal:      Right lower leg: No edema.      Left lower leg: No edema.   Skin:     General: Skin is warm and dry.   Neurological:       General: No focal deficit present.      Mental Status: She is alert and oriented to person, place, and time.   Psychiatric:         Mood and Affect: Mood normal.             Significant Labs: All pertinent labs within the past 24 hours have been reviewed.    Significant Imaging: I have reviewed all pertinent imaging results/findings within the past 24 hours.

## 2024-11-12 NOTE — PROGRESS NOTES
[facility]  Progress Note    Patient Name: Nazanin Malone  MRN: [id number]  Patient Class: IP- Inpatient   Admission Date: 11/11/2024  Length of Stay: 0 days  Attending Physician: Shlomo Albert MD  Primary Care Provider: Pee Montgomery MD        Subjective:     Principal Problem:Sickle cell anemia with crisis        HPI:  Nazanin Malone is a 44 y.o.F with hx of sickle cell, anemia, hypertension, SAMUEL presents to [facility] ED with complaints of severe abd pain/chest pain and diarrhea x2d. Patient stated she was seen here yesterday for abdominal pain and n/v. She was given antiemetics and symptoms improved so she was discharged home. Reports that when she got home, her pain continued and she then began having diarrhea. Reports about 8 episodes since she was discharged. Endorsing chest pain that she attributes to vomiting so much prior. Also reports the abdominal pain and chest pain are consistent with prior pain crises. Denies fever, chills, chest pain, palpitations, SOB, cough, abdominal pain, n/v/d, dysuria, headaches, or any other symptoms at this time.     In ED: AFVSS. CBC with stable, chronic anemia. Retic 1.4. K 3.3, bicarb 20, otherwise CMP unremarkable. BNP 39, troponin negative x2. CXR with no change of the chest since 11/10 (no acute abnormality). Stool studies pending. S/p maalox, dialudid inj x2, zofran, compazine, 1L IVF bolus. Admitted to  for further evaluation.     Overview/Hospital Course:  Pt with ongoing abdominal pain, uptitrated PO regimen and encouraged continued PO use in lieu of IV pain regimen. GI consulted for non-specific gastric soft tissue abnormality. Prior plans for OP follow up stymied by repeated admission for abdominal pain vs. sickle cell pain crises.     Interval History: Pt requesting uptitration of dilaudid. Added one time dose of dilaudid and increased PO regimen of oxy from 15mg (home dose) to 30mg. GI consulted for gastric soft tissue  abnormality evaluation with EGD.    Review of Systems   Constitutional:  Negative for activity change, appetite change and fever.   Respiratory:  Negative for cough, shortness of breath and wheezing.    Cardiovascular:  Negative for chest pain and leg swelling.   Gastrointestinal:  Positive for abdominal pain, diarrhea, nausea and vomiting. Negative for constipation.   Skin:  Negative for rash.   Neurological:  Negative for headaches.     Objective:     Vital Signs (Most Recent):  Temp: 98.2 °F (36.8 °C) (11/12/24 1124)  Pulse: 65 (11/12/24 1507)  Resp: 18 (11/12/24 1622)  BP: (!) 148/91 (11/12/24 1124)  SpO2: 96 % (11/12/24 1124) Vital Signs (24h Range):  Temp:  [98.2 °F (36.8 °C)-98.8 °F (37.1 °C)] 98.2 °F (36.8 °C)  Pulse:  [62-79] 65  Resp:  [17-18] 18  SpO2:  [95 %-97 %] 96 %  BP: (117-148)/(63-91) 148/91     Weight: 94.3 kg (207 lb 14.3 oz)  Body mass index is 38.02 kg/m².    Intake/Output Summary (Last 24 hours) at 11/12/2024 1629  Last data filed at 11/12/2024 0611  Gross per 24 hour   Intake 980 ml   Output --   Net 980 ml         Physical Exam  Vitals and nursing note reviewed.   HENT:      Head: Normocephalic and atraumatic.   Eyes:      Extraocular Movements: Extraocular movements intact.      Conjunctiva/sclera: Conjunctivae normal.   Cardiovascular:      Rate and Rhythm: Normal rate and regular rhythm.      Pulses: Normal pulses.   Pulmonary:      Effort: Pulmonary effort is normal. No respiratory distress.      Breath sounds: No wheezing or rales.   Abdominal:      Palpations: Abdomen is soft.      Tenderness: There is abdominal tenderness (epigastric). There is no guarding.   Musculoskeletal:      Right lower leg: No edema.      Left lower leg: No edema.   Skin:     General: Skin is warm and dry.   Neurological:      General: No focal deficit present.      Mental Status: She is alert and oriented to person, place, and time.   Psychiatric:         Mood and Affect: Mood normal.             Significant  Labs: All pertinent labs within the past 24 hours have been reviewed.    Significant Imaging: I have reviewed all pertinent imaging results/findings within the past 24 hours.    Assessment/Plan:      * Sickle cell anemia with crisis  44 y.o.F with hx of sickle cell presents for complaints of chest pain, abdominal pain, diarrhea, N/V (now improved)   - AF, hypertensive, otherwise VSS  - CXR negative for acute process and similar to previous study completed yesterday.  - Labs with Hb 8.9 (~ BL), Retic 1.4  - s/p dilaudid inj x2 in ED, zofran x1, compazine x1, 1L bolus in ED  - Start pain regimen - scheduled tylenol, increased oxycodone prn from 15mg to 30mg, dilaudid 1mg q3hrs prn, continue home gabapentin and MS contin  - Continue folic acid  - Monitor with daily CBC  - Senna-doc BID when diarrhea improves  - Benadryl and Zofran PRN  - Wean IV pain meds as tolerated to oral as pain diminishes  - Transfuse for Hgb <7, <10 if acute chest syndrome    Diarrhea        Abdominal pain  Diarrhea  - complaints of abd pain, n/v/d x2 d   - currently AFVSS, no leukocytosis  - mildly hypokalemic (replacing)   - stool studies pending   - had CT abd/pelvis done yesterday during previous ED presentation: numerous subcutaneous nodules along the anterior abdominal wall which have continued to increase in size and surrounding stranding across recent priors, likely sequela of serial injections however correlation for cellulitis advised, similar appearing fullness in the region of the gastric body, correlation with EGD advised  - dominant symptom now diarrhea, encourage oral hydration with Pedialyte to prevent dehydration  - scheduled bentyl   - prn analgesics  - prn antiemetics   - GI consulted for EGD evaluation of gastric fullness on CT    Long term (current) use of anticoagulants  Thrombosis of internal carotid, left   This patient has long term use on an anticoagulant with Select Anticoagulant(s): Enoxaparin. Their long term  "anticoagulation will be Held or Continued: continued. They are on long term anticoagulation due to Reason for Anticoagulation: Thrombosis of L internal carotid .   - patient states she was previously on oral anticoagulants and they "didn't work"   - recently discharged on 10/31/2024 on lovenox inj BID  - continue while inpatient    Thrombosis of internal carotid, left        Hypokalemia  Patient's most recent potassium results are listed below.   Recent Labs     11/10/24  0930 11/11/24 0427 11/12/24  0400   K 3.5 3.3* 3.9       Plan  - Replete potassium per protocol  - Monitor potassium Daily  - Patient's hypokalemia is stable    Anxiety  - continue home fluoxetine  - continue home prn xanax      Hypertension  Patients blood pressure range in the last 24 hours was: BP  Min: 117/63  Max: 200/90.The patient's inpatient anti-hypertensive regimen is listed below:  Current Antihypertensives  amLODIPine tablet 10 mg, Daily, Oral    Plan  - BP is uncontrolled  - patient has not received morning meds and hypertensive   - consider additional agent if BP systolic consistently >180    Iron deficiency anemia  Anemia is likely due to Iron deficiency and chronic disease due to sickle cell . Most recent hemoglobin and hematocrit are listed below.  Recent Labs     11/10/24  0930 11/10/24  0937 11/11/24 0427 11/12/24  0400   HGB 9.1*  --  8.9* 8.3*   HCT 26.7* 31* 27.2* 25.9*       Plan  - Monitor serial CBC: Daily  - Transfuse PRBC if patient becomes hemodynamically unstable, symptomatic or H/H drops below 7/21.  - Patient has not received any PRBC transfusions to date  - Patient's anemia is currently stable      VTE Risk Mitigation (From admission, onward)           Ordered     enoxaparin injection 100 mg  Every 12 hours         11/11/24 0808     Place sequential compression device  Until discontinued         11/11/24 0738     IP VTE HIGH RISK PATIENT  Once         11/11/24 0738     Place sequential compression device  Until " discontinued         11/11/24 0738                    Discharge Planning   ALYSE: 11/15/2024     Code Status: Full Code   Is the patient medically ready for discharge?:     Reason for patient still in hospital (select all that apply): Laboratory test, Treatment, and Consult recommendations                     Shlomo Albert MD  Department of Hospital Medicine   [facility]

## 2024-11-12 NOTE — ASSESSMENT & PLAN NOTE
Diarrhea  - complaints of abd pain, n/v/d x2 d   - currently AFVSS, no leukocytosis  - mildly hypokalemic (replacing)   - stool studies pending   - had CT abd/pelvis done yesterday during previous ED presentation: numerous subcutaneous nodules along the anterior abdominal wall which have continued to increase in size and surrounding stranding across recent priors, likely sequela of serial injections however correlation for cellulitis advised, similar appearing fullness in the region of the gastric body, correlation with EGD advised  - dominant symptom now diarrhea, encourage oral hydration with Pedialyte to prevent dehydration  - scheduled bentyl   - prn analgesics  - prn antiemetics   - GI consulted for EGD evaluation of gastric fullness on CT

## 2024-11-12 NOTE — SUBJECTIVE & OBJECTIVE
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    Asthma     Depression     Hypertension     Morbid obesity     Opioid dependence 2017    Pneumonia due to Streptococcus pneumoniae 2017    Right lower lobe pneumonia 2017    Sepsis due to Streptococcus pneumoniae 2017    Sickle cell-beta thalassemia disease with pain     Trigeminal neuralgia        Past Surgical History:   Procedure Laterality Date     SECTION      TONSILLECTOMY      TUBAL LIGATION         Review of patient's allergies indicates:   Allergen Reactions    Cat dander Anaphylaxis, Itching and Shortness Of Breath    Nsaids (non-steroidal anti-inflammatory drug) Itching and Anaphylaxis    Latex Rash     Family History       Problem Relation (Age of Onset)    Diabetes Mother    Heart disease Mother, Father          Tobacco Use    Smoking status: Never    Smokeless tobacco: Never   Substance and Sexual Activity    Alcohol use: No    Drug use: Yes     Types: Marijuana     Comment: periodically     Sexual activity: Not Currently     Birth control/protection: See Surgical Hx     Review of Systems   Constitutional:  Negative for activity change, chills and fever.   HENT:  Negative for trouble swallowing.    Eyes:  Negative for photophobia and visual disturbance.   Respiratory:  Negative for cough and chest tightness.    Cardiovascular:  Positive for chest pain. Negative for palpitations and leg swelling.   Gastrointestinal:  Positive for abdominal pain, diarrhea, nausea and vomiting. Negative for constipation.   Genitourinary:  Negative for dysuria, frequency and hematuria.   Musculoskeletal:  Negative for back pain, gait problem and neck pain.   Skin:  Negative for rash and wound.   Neurological:  Negative for dizziness, syncope, speech difficulty and light-headedness.   Psychiatric/Behavioral:  Negative for agitation and  confusion. The patient is not nervous/anxious.      Objective:     Vital Signs (Most Recent):  Temp: 98.4 °F (36.9 °C) (11/12/24 0728)  Pulse: 66 (11/12/24 0728)  Resp: 18 (11/12/24 1009)  BP: (!) 145/88 (11/12/24 0728)  SpO2: 96 % (11/12/24 0728) Vital Signs (24h Range):  Temp:  [98.3 °F (36.8 °C)-98.8 °F (37.1 °C)] 98.4 °F (36.9 °C)  Pulse:  [62-68] 66  Resp:  [17-20] 18  SpO2:  [95 %-99 %] 96 %  BP: (117-145)/(63-88) 145/88     Weight: 94.3 kg (207 lb 14.3 oz) (11/11/24 0312)  Body mass index is 38.02 kg/m².      Intake/Output Summary (Last 24 hours) at 11/12/2024 1032  Last data filed at 11/12/2024 0611  Gross per 24 hour   Intake 1180 ml   Output --   Net 1180 ml       Lines/Drains/Airways       Central Venous Catheter Line  Duration             Port A Cath Single Lumen Atrial Right -- days                     Physical Exam  Vitals and nursing note reviewed.   Constitutional:       General: She is not in acute distress.     Appearance: She is well-developed.   HENT:      Head: Normocephalic and atraumatic.      Mouth/Throat:      Pharynx: No oropharyngeal exudate.   Eyes:      Extraocular Movements: Extraocular movements intact.      Conjunctiva/sclera: Conjunctivae normal.   Cardiovascular:      Rate and Rhythm: Normal rate and regular rhythm.      Heart sounds: Normal heart sounds.   Pulmonary:      Effort: Pulmonary effort is normal. No respiratory distress.      Breath sounds: Normal breath sounds. No wheezing.   Abdominal:      General: Bowel sounds are normal. There is no distension.      Palpations: Abdomen is soft.      Tenderness: There is abdominal tenderness (LUQ tenderness to palpation).   Musculoskeletal:         General: No tenderness. Normal range of motion.      Cervical back: Normal range of motion and neck supple.   Lymphadenopathy:      Cervical: No cervical adenopathy.   Skin:     General: Skin is warm and dry.      Capillary Refill: Capillary refill takes less than 2 seconds.      Findings:  No rash.   Neurological:      Mental Status: She is alert and oriented to person, place, and time.      Cranial Nerves: No cranial nerve deficit.      Sensory: No sensory deficit.      Coordination: Coordination normal.   Psychiatric:         Behavior: Behavior normal.         Thought Content: Thought content normal.         Judgment: Judgment normal.          Significant Labs:  Recent Lab Results         11/12/24  0400        Albumin 3.5       ALP 73       ALT 28       Anion Gap 8       AST 51       Baso # 0.07       Basophil % 1.0       BILIRUBIN TOTAL 0.7  Comment: For infants and newborns, interpretation of results should be based  on gestational age, weight and in agreement with clinical  observations.    Premature Infant recommended reference ranges:  Up to 24 hours.............<8.0 mg/dL  Up to 48 hours............<12.0 mg/dL  3-5 days..................<15.0 mg/dL  6-29 days.................<15.0 mg/dL         BUN 10       Calcium 9.7       Chloride 106       CO2 25       Creatinine 1.1       Differential Method Automated       eGFR >60.0       Eos # 0.3       Eos % 4.8       Glucose 68       Gran # (ANC) 2.5       Gran % 36.3       Hematocrit 25.9       Hemoglobin 8.3       Immature Grans (Abs) 0.01  Comment: Mild elevation in immature granulocytes is non specific and   can be seen in a variety of conditions including stress response,   acute inflammation, trauma and pregnancy. Correlation with other   laboratory and clinical findings is essential.         Immature Granulocytes 0.1       Lymph # 3.3       Lymph % 48.5       Magnesium  2.1       MCH 22.0       MCHC 32.0       MCV 69       Mono # 0.6       Mono % 9.3       MPV 10.2       nRBC 2       Phosphorus Level 3.4       Platelet Count 513       Potassium 3.9       PROTEIN TOTAL 7.3       RBC 3.77       RDW 18.8       Sodium 139       WBC 6.81               Significant Imaging:  Imaging results within the past 24 hours have been reviewed.

## 2024-11-12 NOTE — ASSESSMENT & PLAN NOTE
Patients blood pressure range in the last 24 hours was: BP  Min: 117/63  Max: 200/90.The patient's inpatient anti-hypertensive regimen is listed below:  Current Antihypertensives  amLODIPine tablet 10 mg, Daily, Oral    Plan  - BP is uncontrolled  - patient has not received morning meds and hypertensive   - consider additional agent if BP systolic consistently >180

## 2024-11-12 NOTE — CONSULTS
[facility] Surg  Gastroenterology  Consult Note    Patient Name: Nazanin Malone  MRN: [id number]  Admission Date: 11/11/2024  Hospital Length of Stay: 0 days  Code Status: Full Code   Attending Provider: Shlomo Albert MD   Consulting Provider: Price Moses MD  Primary Care Physician: Pee Montgomery MD  Principal Problem:Sickle cell anemia with crisis    Inpatient consult to Gastroenterology  Consult performed by: Price Moses MD  Consult ordered by: Shlomo Albert MD  Reason for consult: CT A/P findings of non-specific soft tissue fulness in gastric body.  Assessment/Recommendations: Pt came in with refractory N/V, abdo pain, diarrhea. She's been coming with what were assumed to be sickle cell pain crises, but her last 3 CT APs have noted a non-specific soft tissue fullness in the gastric body with EGD correlation.      - Plan for EGD evaluation of gastric body imaging findings.   - F/u stool studies given suspicion for infectious etiology of n/v/d.          Subjective:     HPI:  44F w/ PMHx sickle cell, anemia, hypertension, SAMUEL, [place] [facility] [place] severe [place]d pain/chest pain and diarrhea x2d. She was given antiemetics and symptoms improved so she was discharged home. Reports that when she got home, her pain continued and she then began having diarrhea. Reports about 8 episodes since she was discharged. Endorsing chest pain that she attributes to vomiting so much prior. Also reports the abdominal pain and chest pain are consistent with prior pain crises.     Overall: refractory n/v, abdominal pain, diarrhea. She's been coming with what were assumed to be sickle cell pain crises, but her last 3 CT APs have noted a non-specific soft tissue fullness in the gastric body with EGD correlation. GI has recommended OP f/u previously, but continues to be readmitted without making it to outpatient follow-up. On evaluation 11/12 AM, patient endorses 1.5 months of n/v, 0.5 months of  diarrhea, both intermittent but frequent, with multiple weekly episodes. GI consult for EGD to evaluate gastric findings on CT.     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    Asthma     Depression     Hypertension     Morbid obesity     Opioid dependence 2017    Pneumonia due to Streptococcus pneumoniae 2017    Right lower lobe pneumonia 2017    Sepsis due to Streptococcus pneumoniae 2017    Sickle cell-beta thalassemia disease with pain     Trigeminal neuralgia        Past Surgical History:   Procedure Laterality Date     SECTION      TONSILLECTOMY      TUBAL LIGATION         Review of patient's allergies indicates:   Allergen Reactions    Cat dander Anaphylaxis, Itching and Shortness Of Breath    Nsaids (non-steroidal anti-inflammatory drug) Itching and Anaphylaxis    Latex Rash     Family History       Problem Relation (Age of Onset)    Diabetes Mother    Heart disease Mother, Father          Tobacco Use    Smoking status: Never    Smokeless tobacco: Never   Substance and Sexual Activity    Alcohol use: No    Drug use: Yes     Types: Marijuana     Comment: periodically     Sexual activity: Not Currently     Birth control/protection: See Surgical Hx     Review of Systems   Constitutional:  Negative for activity change, chills and fever.   HENT:  Negative for trouble swallowing.    Eyes:  Negative for photophobia and visual disturbance.   Respiratory:  Negative for cough and chest tightness.    Cardiovascular:  Positive for chest pain. Negative for palpitations and leg swelling.   Gastrointestinal:  Positive for abdominal pain, diarrhea, nausea and vomiting. Negative for constipation.   Genitourinary:  Negative for dysuria, frequency and hematuria.   Musculoskeletal:  Negative for back pain, gait problem and neck pain.   Skin:  Negative for rash and wound.   Neurological:   Negative for dizziness, syncope, speech difficulty and light-headedness.   Psychiatric/Behavioral:  Negative for agitation and confusion. The patient is not nervous/anxious.      Objective:     Vital Signs (Most Recent):  Temp: 98.4 °F (36.9 °C) (11/12/24 0728)  Pulse: 66 (11/12/24 0728)  Resp: 18 (11/12/24 1009)  BP: (!) 145/88 (11/12/24 0728)  SpO2: 96 % (11/12/24 0728) Vital Signs (24h Range):  Temp:  [98.3 °F (36.8 °C)-98.8 °F (37.1 °C)] 98.4 °F (36.9 °C)  Pulse:  [62-68] 66  Resp:  [17-20] 18  SpO2:  [95 %-99 %] 96 %  BP: (117-145)/(63-88) 145/88     Weight: 94.3 kg (207 lb 14.3 oz) (11/11/24 0312)  Body mass index is 38.02 kg/m².      Intake/Output Summary (Last 24 hours) at 11/12/2024 1032  Last data filed at 11/12/2024 0611  Gross per 24 hour   Intake 1180 ml   Output --   Net 1180 ml       Lines/Drains/Airways       Central Venous Catheter Line  Duration             Port A Cath Single Lumen Atrial Right -- days                     Physical Exam  Vitals and nursing note reviewed.   Constitutional:       General: She is not in acute distress.     Appearance: She is well-developed.   HENT:      Head: Normocephalic and atraumatic.      Mouth/Throat:      Pharynx: No oropharyngeal exudate.   Eyes:      Extraocular Movements: Extraocular movements intact.      Conjunctiva/sclera: Conjunctivae normal.   Cardiovascular:      Rate and Rhythm: Normal rate and regular rhythm.      Heart sounds: Normal heart sounds.   Pulmonary:      Effort: Pulmonary effort is normal. No respiratory distress.      Breath sounds: Normal breath sounds. No wheezing.   Abdominal:      General: Bowel sounds are normal. There is no distension.      Palpations: Abdomen is soft.      Tenderness: There is abdominal tenderness (LUQ tenderness to palpation).   Musculoskeletal:         General: No tenderness. Normal range of motion.      Cervical back: Normal range of motion and neck supple.   Lymphadenopathy:      Cervical: No cervical  adenopathy.   Skin:     General: Skin is warm and dry.      Capillary Refill: Capillary refill takes less than 2 seconds.      Findings: No rash.   Neurological:      Mental Status: She is alert and oriented to person, place, and time.      Cranial Nerves: No cranial nerve deficit.      Sensory: No sensory deficit.      Coordination: Coordination normal.   Psychiatric:         Behavior: Behavior normal.         Thought Content: Thought content normal.         Judgment: Judgment normal.          Significant Labs:  Recent Lab Results         11/12/24  0400        Albumin 3.5       ALP 73       ALT 28       Anion Gap 8       AST 51       Baso # 0.07       Basophil % 1.0       BILIRUBIN TOTAL 0.7  Comment: For infants and newborns, interpretation of results should be based  on gestational age, weight and in agreement with clinical  observations.    Premature Infant recommended reference ranges:  Up to 24 hours.............<8.0 mg/dL  Up to 48 hours............<12.0 mg/dL  3-5 days..................<15.0 mg/dL  6-29 days.................<15.0 mg/dL         BUN 10       Calcium 9.7       Chloride 106       CO2 25       Creatinine 1.1       Differential Method Automated       eGFR >60.0       Eos # 0.3       Eos % 4.8       Glucose 68       Gran # (ANC) 2.5       Gran % 36.3       Hematocrit 25.9       Hemoglobin 8.3       Immature Grans (Abs) 0.01  Comment: Mild elevation in immature granulocytes is non specific and   can be seen in a variety of conditions including stress response,   acute inflammation, trauma and pregnancy. Correlation with other   laboratory and clinical findings is essential.         Immature Granulocytes 0.1       Lymph # 3.3       Lymph % 48.5       Magnesium  2.1       MCH 22.0       MCHC 32.0       MCV 69       Mono # 0.6       Mono % 9.3       MPV 10.2       nRBC 2       Phosphorus Level 3.4       Platelet Count 513       Potassium 3.9       PROTEIN TOTAL 7.3       RBC 3.77       RDW 18.8        Sodium 139       WBC 6.81               Significant Imaging:  Imaging results within the past 24 hours have been reviewed.  Assessment/Plan:     GI  Abdominal pain  Pt came in with refractory N/V, abdo pain, diarrhea. She's been coming with what were assumed to be sickle cell pain crises, but her last 3 CT APs have noted a non-specific soft tissue fullness in the gastric body with EGD correlation.     - Plan for EGD evaluation of gastric body imaging findings.   - F/u stool studies given suspicion for infectious etiology of n/v/d.        Thank you for your consult. I will follow-up with patient. Please contact us if you have any additional questions.    Price Moses MD  Gastroenterology  [facility] Surg

## 2024-11-12 NOTE — PLAN OF CARE
Nazanin Malone has warranted treatment spanning two or more midnights of hospital level care for the management of Sickle Cell Pain Crisis. She continues to require daily labs, monitoring of vital signs, IV pain medication, and medication adjustments. Her condition is also complicated by the following comorbidities:  Left ICA Thrombus, Long Term Anticoagulation, Hypertension, Obesity, and Chronic Anemia

## 2024-11-12 NOTE — NURSING
I assumed care of this patient at this time. Report received by FRANCIS Tobin. Patient arrived to the unit via stretcher. The patient is AAOx4. The bed is in the lowest position, locked, with two side rails up, and the call light within reach.

## 2024-11-12 NOTE — HPI
44F w/ PMHx sickle cell, anemia, hypertension, SAMUEL, initially presented to [facility] [place] c/o severe abd pain/chest pain and diarrhea x2d. She was given antiemetics and symptoms improved so she was discharged home. Reports that when she got home, her pain continued and she then began having diarrhea. Reports about 8 episodes since she was discharged. Endorsing chest pain that she attributes to vomiting so much prior. Also reports the abdominal pain and chest pain are consistent with prior pain crises.     Overall: refractory n/v, abdominal pain, diarrhea. She's been coming with what were assumed to be sickle cell pain crises, but her last 3 CT APs have noted a non-specific soft tissue fullness in the gastric body with EGD correlation. GI has recommended OP f/u previously, but continues to be readmitted without making it to outpatient follow-up. On evaluation 11/12 AM, patient endorses 1.5 months of n/v, 0.5 months of diarrhea, both intermittent but frequent, with multiple weekly episodes. GI consult for EGD to evaluate gastric findings on CT.

## 2024-11-12 NOTE — HOSPITAL COURSE
Pt with ongoing abdominal pain, uptitrated PO regimen and encouraged continued PO use in lieu of IV pain regimen. GI consulted for non-specific gastric soft tissue abnormality. Prior plans for OP follow up stymied by repeated admission for abdominal pain vs. sickle cell pain crises. EGD with mucosal atrophy and duodenal nodule both biopsied. No masses visualized in stomach. Hospital course complicated by FLOR 2/2 hypotension associated with tizanidine and opioid regimen. Given hypotension pt requests for advancing IV dilaudid dosing and frequency were denied and risks of ongoing opioid-associated hypotension were explained to pt. BP recovered during course of admission, but pt reported 2 BRBPR with some blood coating stool. CESAR at bedside revealed one external hemorrhoid, and no blood on glove after CESAR. No further episodes of BRBPR afterwards and Hgb and vitals stable. Advised continued monitoring for any recurrence to facilitate pt requested DC. Pt discharged to home with refill of home opioids to last until upcoming Heme/Onc follow up on 11/21 along with requested tizanidine for muscle spasms, and promethazine for N/V.

## 2024-11-12 NOTE — ASSESSMENT & PLAN NOTE
44 y.o.F with hx of sickle cell presents for complaints of chest pain, abdominal pain, diarrhea, N/V (now improved)   - AF, hypertensive, otherwise VSS  - CXR negative for acute process and similar to previous study completed yesterday.  - Labs with Hb 8.9 (~ BL), Retic 1.4  - s/p dilaudid inj x2 in ED, zofran x1, compazine x1, 1L bolus in ED  - Start pain regimen - scheduled tylenol, increased oxycodone prn from 15mg to 30mg, dilaudid 1mg q3hrs prn, continue home gabapentin and MS contin  - Continue folic acid  - Monitor with daily CBC  - Senna-doc BID when diarrhea improves  - Benadryl and Zofran PRN  - Wean IV pain meds as tolerated to oral as pain diminishes  - Transfuse for Hgb <7, <10 if acute chest syndrome

## 2024-11-12 NOTE — ASSESSMENT & PLAN NOTE
Pt came in with refractory N/V, abdo pain, diarrhea. She's been coming with what were assumed to be sickle cell pain crises, but her last 3 CT APs have noted a non-specific soft tissue fullness in the gastric body with EGD correlation.     - Plan for EGD evaluation of gastric body imaging findings.   - F/u stool studies given suspicion for infectious etiology of n/v/d.

## 2024-11-12 NOTE — PLAN OF CARE
Problem: Adult Inpatient Plan of Care  Goal: Absence of Hospital-Acquired Illness or Injury  Outcome: Progressing  Intervention: Prevent Skin Injury  Flowsheets (Taken 11/12/2024 1639)  Body Position: position changed independently  Device Skin Pressure Protection: tubing/devices free from skin contact  Intervention: Prevent and Manage VTE (Venous Thromboembolism) Risk  Flowsheets (Taken 11/12/2024 1639)  VTE Prevention/Management:   fluids promoted   ambulation promoted     Problem: Pneumonia  Goal: Fluid Balance  Outcome: Progressing  Intervention: Monitor and Manage Fluid Balance  Flowsheets (Taken 11/12/2024 1639)  Fluid/Electrolyte Management: oral rehydration therapy initiated  Goal: Resolution of Infection Signs and Symptoms  Intervention: Prevent Infection Progression  Flowsheets (Taken 11/12/2024 1639)  Fever Reduction/Comfort Measures: lightweight bedding  Isolation Precautions: precautions maintained     Problem: Infection  Goal: Absence of Infection Signs and Symptoms  Outcome: Progressing  Intervention: Prevent or Manage Infection  Flowsheets (Taken 11/12/2024 1639)  Fever Reduction/Comfort Measures: lightweight bedding  Isolation Precautions: precautions maintained

## 2024-11-13 ENCOUNTER — ANESTHESIA (OUTPATIENT)
Dept: ENDOSCOPY | Facility: HOSPITAL | Age: 44
End: 2024-11-13
Payer: MEDICARE

## 2024-11-13 ENCOUNTER — ANESTHESIA EVENT (OUTPATIENT)
Dept: ENDOSCOPY | Facility: HOSPITAL | Age: 44
End: 2024-11-13
Payer: MEDICARE

## 2024-11-13 LAB
ALBUMIN SERPL BCP-MCNC: 3.3 G/DL (ref 3.5–5.2)
ALP SERPL-CCNC: 71 U/L (ref 40–150)
ALT SERPL W/O P-5'-P-CCNC: 37 U/L (ref 10–44)
ANION GAP SERPL CALC-SCNC: 7 MMOL/L (ref 8–16)
AST SERPL-CCNC: 57 U/L (ref 10–40)
BACTERIA STL CULT: NORMAL
BASOPHILS # BLD AUTO: 0.04 K/UL (ref 0–0.2)
BASOPHILS NFR BLD: 0.6 % (ref 0–1.9)
BILIRUB SERPL-MCNC: 0.4 MG/DL (ref 0.1–1)
BUN SERPL-MCNC: 22 MG/DL (ref 6–20)
CALCIUM SERPL-MCNC: 9.2 MG/DL (ref 8.7–10.5)
CHLORIDE SERPL-SCNC: 102 MMOL/L (ref 95–110)
CO2 SERPL-SCNC: 26 MMOL/L (ref 23–29)
CREAT SERPL-MCNC: 2 MG/DL (ref 0.5–1.4)
DIFFERENTIAL METHOD BLD: ABNORMAL
EOSINOPHIL # BLD AUTO: 0.4 K/UL (ref 0–0.5)
EOSINOPHIL NFR BLD: 5.8 % (ref 0–8)
ERYTHROCYTE [DISTWIDTH] IN BLOOD BY AUTOMATED COUNT: 19.2 % (ref 11.5–14.5)
EST. GFR  (NO RACE VARIABLE): 30.6 ML/MIN/1.73 M^2
GLUCOSE SERPL-MCNC: 119 MG/DL (ref 70–110)
HCT VFR BLD AUTO: 23.8 % (ref 37–48.5)
HGB BLD-MCNC: 7.9 G/DL (ref 12–16)
IMM GRANULOCYTES # BLD AUTO: 0.01 K/UL (ref 0–0.04)
IMM GRANULOCYTES NFR BLD AUTO: 0.1 % (ref 0–0.5)
LYMPHOCYTES # BLD AUTO: 3.7 K/UL (ref 1–4.8)
LYMPHOCYTES NFR BLD: 51.9 % (ref 18–48)
MAGNESIUM SERPL-MCNC: 2.3 MG/DL (ref 1.6–2.6)
MCH RBC QN AUTO: 22.8 PG (ref 27–31)
MCHC RBC AUTO-ENTMCNC: 33.2 G/DL (ref 32–36)
MCV RBC AUTO: 69 FL (ref 82–98)
MONOCYTES # BLD AUTO: 0.8 K/UL (ref 0.3–1)
MONOCYTES NFR BLD: 11 % (ref 4–15)
NEUTROPHILS # BLD AUTO: 2.2 K/UL (ref 1.8–7.7)
NEUTROPHILS NFR BLD: 30.6 % (ref 38–73)
NRBC BLD-RTO: 3 /100 WBC
PHOSPHATE SERPL-MCNC: 5 MG/DL (ref 2.7–4.5)
PLATELET # BLD AUTO: 444 K/UL (ref 150–450)
PMV BLD AUTO: 9.6 FL (ref 9.2–12.9)
POTASSIUM SERPL-SCNC: 4.1 MMOL/L (ref 3.5–5.1)
PROT SERPL-MCNC: 6.9 G/DL (ref 6–8.4)
RBC # BLD AUTO: 3.46 M/UL (ref 4–5.4)
SODIUM SERPL-SCNC: 135 MMOL/L (ref 136–145)
WBC # BLD AUTO: 7.03 K/UL (ref 3.9–12.7)

## 2024-11-13 PROCEDURE — 84100 ASSAY OF PHOSPHORUS: CPT

## 2024-11-13 PROCEDURE — 94761 N-INVAS EAR/PLS OXIMETRY MLT: CPT

## 2024-11-13 PROCEDURE — 27201012 HC FORCEPS, HOT/COLD, DISP: Performed by: STUDENT IN AN ORGANIZED HEALTH CARE EDUCATION/TRAINING PROGRAM

## 2024-11-13 PROCEDURE — 43239 EGD BIOPSY SINGLE/MULTIPLE: CPT | Mod: ,,, | Performed by: STUDENT IN AN ORGANIZED HEALTH CARE EDUCATION/TRAINING PROGRAM

## 2024-11-13 PROCEDURE — 25000003 PHARM REV CODE 250

## 2024-11-13 PROCEDURE — 63600175 PHARM REV CODE 636 W HCPCS

## 2024-11-13 PROCEDURE — 25000003 PHARM REV CODE 250: Performed by: HOSPITALIST

## 2024-11-13 PROCEDURE — 88342 IMHCHEM/IMCYTCHM 1ST ANTB: CPT | Performed by: PATHOLOGY

## 2024-11-13 PROCEDURE — 0DB98ZX EXCISION OF DUODENUM, VIA NATURAL OR ARTIFICIAL OPENING ENDOSCOPIC, DIAGNOSTIC: ICD-10-PCS | Performed by: STUDENT IN AN ORGANIZED HEALTH CARE EDUCATION/TRAINING PROGRAM

## 2024-11-13 PROCEDURE — 83735 ASSAY OF MAGNESIUM: CPT

## 2024-11-13 PROCEDURE — 37000008 HC ANESTHESIA 1ST 15 MINUTES: Performed by: STUDENT IN AN ORGANIZED HEALTH CARE EDUCATION/TRAINING PROGRAM

## 2024-11-13 PROCEDURE — 37000009 HC ANESTHESIA EA ADD 15 MINS: Performed by: STUDENT IN AN ORGANIZED HEALTH CARE EDUCATION/TRAINING PROGRAM

## 2024-11-13 PROCEDURE — 80053 COMPREHEN METABOLIC PANEL: CPT

## 2024-11-13 PROCEDURE — 43239 EGD BIOPSY SINGLE/MULTIPLE: CPT | Performed by: STUDENT IN AN ORGANIZED HEALTH CARE EDUCATION/TRAINING PROGRAM

## 2024-11-13 PROCEDURE — 21400001 HC TELEMETRY ROOM

## 2024-11-13 PROCEDURE — 63600175 PHARM REV CODE 636 W HCPCS: Performed by: STUDENT IN AN ORGANIZED HEALTH CARE EDUCATION/TRAINING PROGRAM

## 2024-11-13 PROCEDURE — 88305 TISSUE EXAM BY PATHOLOGIST: CPT | Mod: 59 | Performed by: PATHOLOGY

## 2024-11-13 PROCEDURE — 85025 COMPLETE CBC W/AUTO DIFF WBC: CPT

## 2024-11-13 PROCEDURE — 0DB68ZX EXCISION OF STOMACH, VIA NATURAL OR ARTIFICIAL OPENING ENDOSCOPIC, DIAGNOSTIC: ICD-10-PCS | Performed by: STUDENT IN AN ORGANIZED HEALTH CARE EDUCATION/TRAINING PROGRAM

## 2024-11-13 PROCEDURE — 94799 UNLISTED PULMONARY SVC/PX: CPT

## 2024-11-13 RX ORDER — ONDANSETRON HYDROCHLORIDE 2 MG/ML
INJECTION, SOLUTION INTRAVENOUS
Status: DISCONTINUED | OUTPATIENT
Start: 2024-11-13 | End: 2024-11-13

## 2024-11-13 RX ORDER — MIDAZOLAM HYDROCHLORIDE 1 MG/ML
INJECTION INTRAMUSCULAR; INTRAVENOUS
Status: DISCONTINUED | OUTPATIENT
Start: 2024-11-13 | End: 2024-11-13

## 2024-11-13 RX ORDER — SODIUM CHLORIDE, SODIUM LACTATE, POTASSIUM CHLORIDE, CALCIUM CHLORIDE 600; 310; 30; 20 MG/100ML; MG/100ML; MG/100ML; MG/100ML
INJECTION, SOLUTION INTRAVENOUS CONTINUOUS
Status: ACTIVE | OUTPATIENT
Start: 2024-11-13 | End: 2024-11-14

## 2024-11-13 RX ORDER — PROPOFOL 10 MG/ML
VIAL (ML) INTRAVENOUS CONTINUOUS PRN
Status: DISCONTINUED | OUTPATIENT
Start: 2024-11-13 | End: 2024-11-13

## 2024-11-13 RX ORDER — LIDOCAINE HYDROCHLORIDE 20 MG/ML
INJECTION INTRAVENOUS
Status: DISCONTINUED | OUTPATIENT
Start: 2024-11-13 | End: 2024-11-13

## 2024-11-13 RX ORDER — TIZANIDINE 4 MG/1
4 TABLET ORAL EVERY 8 HOURS PRN
Status: DISCONTINUED | OUTPATIENT
Start: 2024-11-13 | End: 2024-11-15 | Stop reason: HOSPADM

## 2024-11-13 RX ORDER — PROPOFOL 10 MG/ML
VIAL (ML) INTRAVENOUS
Status: DISCONTINUED | OUTPATIENT
Start: 2024-11-13 | End: 2024-11-13

## 2024-11-13 RX ORDER — FENTANYL CITRATE 50 UG/ML
INJECTION, SOLUTION INTRAMUSCULAR; INTRAVENOUS
Status: DISCONTINUED | OUTPATIENT
Start: 2024-11-13 | End: 2024-11-13

## 2024-11-13 RX ORDER — HYDROMORPHONE HYDROCHLORIDE 1 MG/ML
2 INJECTION, SOLUTION INTRAMUSCULAR; INTRAVENOUS; SUBCUTANEOUS ONCE AS NEEDED
Status: COMPLETED | OUTPATIENT
Start: 2024-11-13 | End: 2024-11-13

## 2024-11-13 RX ADMIN — ACETAMINOPHEN 1000 MG: 500 TABLET ORAL at 08:11

## 2024-11-13 RX ADMIN — TIZANIDINE 4 MG: 4 TABLET ORAL at 01:11

## 2024-11-13 RX ADMIN — GABAPENTIN 600 MG: 300 CAPSULE ORAL at 08:11

## 2024-11-13 RX ADMIN — FENTANYL CITRATE 25 MCG: 50 INJECTION, SOLUTION INTRAMUSCULAR; INTRAVENOUS at 10:11

## 2024-11-13 RX ADMIN — SODIUM CHLORIDE 250 ML: 9 INJECTION, SOLUTION INTRAVENOUS at 01:11

## 2024-11-13 RX ADMIN — PROPOFOL 70 MG: 10 INJECTION, EMULSION INTRAVENOUS at 10:11

## 2024-11-13 RX ADMIN — ALPRAZOLAM 0.25 MG: 0.25 TABLET ORAL at 11:11

## 2024-11-13 RX ADMIN — ONDANSETRON 4 MG: 2 INJECTION INTRAMUSCULAR; INTRAVENOUS at 10:11

## 2024-11-13 RX ADMIN — PROPOFOL 200 MCG/KG/MIN: 10 INJECTION, EMULSION INTRAVENOUS at 10:11

## 2024-11-13 RX ADMIN — DICYCLOMINE HYDROCHLORIDE 10 MG: 10 CAPSULE ORAL at 12:11

## 2024-11-13 RX ADMIN — DICYCLOMINE HYDROCHLORIDE 10 MG: 10 CAPSULE ORAL at 08:11

## 2024-11-13 RX ADMIN — FOLIC ACID 1 MG: 1 TABLET ORAL at 08:11

## 2024-11-13 RX ADMIN — MORPHINE SULFATE 30 MG: 30 TABLET, FILM COATED, EXTENDED RELEASE ORAL at 08:11

## 2024-11-13 RX ADMIN — HYDROMORPHONE HYDROCHLORIDE 1 MG: 1 INJECTION, SOLUTION INTRAMUSCULAR; INTRAVENOUS; SUBCUTANEOUS at 01:11

## 2024-11-13 RX ADMIN — DICYCLOMINE HYDROCHLORIDE 10 MG: 10 CAPSULE ORAL at 04:11

## 2024-11-13 RX ADMIN — FLUOXETINE HYDROCHLORIDE 40 MG: 20 CAPSULE ORAL at 08:11

## 2024-11-13 RX ADMIN — MUPIROCIN: 20 OINTMENT TOPICAL at 08:11

## 2024-11-13 RX ADMIN — LIDOCAINE HYDROCHLORIDE 100 MG: 20 INJECTION INTRAVENOUS at 10:11

## 2024-11-13 RX ADMIN — TIZANIDINE 4 MG: 4 TABLET ORAL at 11:11

## 2024-11-13 RX ADMIN — Medication 250 MG: at 08:11

## 2024-11-13 RX ADMIN — ACETAMINOPHEN 1000 MG: 500 TABLET ORAL at 02:11

## 2024-11-13 RX ADMIN — GABAPENTIN 600 MG: 300 CAPSULE ORAL at 02:11

## 2024-11-13 RX ADMIN — ALPRAZOLAM 0.25 MG: 0.25 TABLET ORAL at 01:11

## 2024-11-13 RX ADMIN — OXYCODONE HYDROCHLORIDE 30 MG: 10 TABLET ORAL at 06:11

## 2024-11-13 RX ADMIN — Medication 200 ML: at 09:11

## 2024-11-13 RX ADMIN — MIDAZOLAM HYDROCHLORIDE 2 MG: 1 INJECTION, SOLUTION INTRAMUSCULAR; INTRAVENOUS at 10:11

## 2024-11-13 RX ADMIN — OXYCODONE HYDROCHLORIDE 30 MG: 10 TABLET ORAL at 12:11

## 2024-11-13 RX ADMIN — SODIUM CHLORIDE, POTASSIUM CHLORIDE, SODIUM LACTATE AND CALCIUM CHLORIDE: 600; 310; 30; 20 INJECTION, SOLUTION INTRAVENOUS at 06:11

## 2024-11-13 RX ADMIN — HYDROMORPHONE HYDROCHLORIDE 2 MG: 1 INJECTION, SOLUTION INTRAMUSCULAR; INTRAVENOUS; SUBCUTANEOUS at 10:11

## 2024-11-13 NOTE — NURSING TRANSFER
Nursing Transfer Note      11/13/2024   11:49 AM    Reason patient is being transferred: post procedure     Transfer To: 622    Transfer via stretcher    Transfer with cardiac monitoring    Transported by pct    Telemetry: Box Number [id number]  Order for Tele Monitor? Yes      Chart send with patient: Yes    Patient reassessed at:1111

## 2024-11-13 NOTE — ASSESSMENT & PLAN NOTE
Patients blood pressure range in the last 24 hours was: BP  Min: 78/43  Max: 200/90.The patient's inpatient anti-hypertensive regimen is listed below:  Current Antihypertensives  amLODIPine tablet 10 mg, Daily, Oral    Plan  - Holding today in setting of hypotension overnight  - Pt advised on not advancing IV dilaudid regimen in setting of meds interaction

## 2024-11-13 NOTE — ANESTHESIA PREPROCEDURE EVALUATION
[facility]  Anesthesia Pre-Operative Evaluation         Patient Name/: Nazanin Malone, 1980  MRN: [id number]    SUBJECTIVE:     Pre-operative evaluation for Procedure(s) (LRB):  EGD (ESOPHAGOGASTRODUODENOSCOPY) (N/A)     2024    Nazanin Malone is a 44 y.o. female     Patient now presents for the above procedure(s).    ________________________________________  Results for orders placed during the hospital encounter of 24    Echo    Interpretation Summary    Left Ventricle: The left ventricle is normal in size. Mildly increased ventricular mass. Mildly increased wall thickness. There is mild eccentric hypertrophy. Regional wall motion abnormalities present. See diagram for wall motion findings. Septal motion is abnormal. There is normal systolic function with a visually estimated ejection fraction of 55 - 60%. Ejection fraction by visual approximation is 58%. There is normal diastolic function.    Right Ventricle: Normal right ventricular cavity size. Wall thickness is normal. Systolic function is normal.    Mitral Valve: There is mild regurgitation.    Tricuspid Valve: There is mild regurgitation.    Pulmonary Artery: The estimated pulmonary artery systolic pressure is 26 mmHg.    IVC/SVC: Normal venous pressure at 3 mmHg.    ________________________________________    LDA:   Port A Cath Single Lumen Atrial Right (Active)   Line Necessity Review Poor venous access 24   Site Assessment No drainage;No redness;No swelling;No warmth 24   Dressing Type CHG impregnated dressing/sponge;Central line dressing 24   Dressing Status Clean;Intact 24   Dressing Intervention Integrity maintained 24   Date on Dressing 24   Dressing Due to be Changed 24   Patency/Care flushed w/o difficulty 24   Status Accessed 24   Accessed by: Kellie Storey RN 24 0400   Needle  Insertion Date 11/11/24 11/13/24 0730   Needle Insertion Time 0400 11/11/24 0400   Type of Needle PowerHuber 11/11/24 0400   Gauge 20 11/11/24 0400   Needle Length 1 in 11/11/24 0400   Flush Performed Yes 11/13/24 0730   Number of days:        Drips:       Patient Active Problem List   Diagnosis    Iron deficiency anemia    Vaso-occlusive pain due to sickle cell disease    Hypertension    Obesity (BMI 30-39.9)    Snoring    Trigeminal neuralgia    Anxiety    Sickle cell disease, type S beta-zero thalassemia with crisis    Intermittent asthma    Hb-SS disease with vaso-occlusive pain    Iron deficiency anemia due to chronic blood loss    Sickle cell anemia    Intermittent asthma without complication    Folate deficiency    Hypokalemia    Menorrhagia with regular cycle    History of pulmonary embolism    Morbid obesity    Acute hypoxemic respiratory failure    CAP (community acquired pneumonia)    QT prolongation    Discharge planning issues    Right knee pain    Sickle cell anemia with crisis    Pulmonary edema    Bacteremia    Encephalopathy    At risk for ineffective clearance of airway    Tegretol toxicity    Thrombosis of internal carotid, left    Left-sided weakness    Long term (current) use of anticoagulants    Anemia    Constipation    Fluid collection at surgical site    Fall    Muscle spasm    Drug-seeking behavior    Right upper quadrant abdominal pain    Midline low back pain without sciatica    Abdominal pain    Diarrhea       Review of patient's allergies indicates:   Allergen Reactions    Cat dander Anaphylaxis, Itching and Shortness Of Breath    Nsaids (non-steroidal anti-inflammatory drug) Itching and Anaphylaxis    Latex Rash       Current Inpatient Medications:    acetaminophen  1,000 mg Oral TID    amLODIPine  10 mg Oral Daily    ascorbic acid (vitamin C)  250 mg Oral Daily    dicyclomine  10 mg Oral QID    electrolytes-dextrose  200 mL Oral Q4H    enoxaparin  100 mg Subcutaneous Q12H     FLUoxetine  40 mg Oral Daily    folic acid  1 mg Oral Daily    gabapentin  600 mg Oral TID    morphine  30 mg Oral Q12H    mupirocin   Nasal BID       No current facility-administered medications on file prior to encounter.     Current Outpatient Medications on File Prior to Encounter   Medication Sig Dispense Refill    acetaminophen (TYLENOL) 500 MG tablet Take 1,000 mg by mouth every 8 (eight) hours as needed for Pain.      albuterol (VENTOLIN HFA) 90 mcg/actuation inhaler Inhale 2 puffs into the lungs every 6 (six) hours as needed for Wheezing or Shortness of Breath. Rescue 18 g 2    ALPRAZolam (XANAX) 0.25 MG tablet Take 1 tablet (0.25 mg total) by mouth 2 (two) times daily as needed for Anxiety. 20 tablet 0    amLODIPine (NORVASC) 10 MG tablet Take 1 tablet (10 mg total) by mouth once daily. 90 tablet 3    ascorbic acid (VITAMIN C ORAL) Take 1 capsule by mouth once daily.      enoxaparin (LOVENOX) 100 mg/mL Syrg Inject 1 mL (100 mg total) into the skin every 12 (twelve) hours. 60 mL 2    FLUoxetine 40 MG capsule Take 1 capsule (40 mg total) by mouth once daily. 90 capsule 1    fluticasone propionate (FLONASE) 50 mcg/actuation nasal spray INSTILL 1 SPRAY INTO EACH NOSTRIL EVERY DAY 16 mL 11    folic acid (FOLVITE) 1 MG tablet Take 1 tablet (1 mg total) by mouth once daily. 60 tablet 0    gabapentin (NEURONTIN) 300 MG capsule Take 2 capsules (600 mg total) by mouth 3 (three) times daily. 180 capsule 11    morphine (MS CONTIN) 30 MG 12 hr tablet Take 1 tablet (30 mg total) by mouth every 12 (twelve) hours. for 21 days 42 tablet 0    ondansetron (ZOFRAN-ODT) 4 MG TbDL Take 1 tablet (4 mg total) by mouth every 8 (eight) hours as needed. 15 tablet 0    polyethylene glycol (GLYCOLAX) 17 gram/dose powder Use to cap to measure 17g, mix with liquid, and take by mouth once daily. 510 g 0    scopolamine (TRANSDERM-SCOP) 1.3-1.5 mg (1 mg over 3 days) Place 1 patch onto the skin Every 3 (three) days. 10 patch 0     senna-docusate 8.6-50 mg (PERICOLACE) 8.6-50 mg per tablet Take 1 tablet by mouth daily as needed for Constipation.         Past Surgical History:   Procedure Laterality Date     SECTION      TONSILLECTOMY      TUBAL LIGATION         Social History:  Tobacco Use: Low Risk  (2024)    Patient History     Smoking Tobacco Use: Never     Smokeless Tobacco Use: Never     Passive Exposure: Not on file       Alcohol Use: Not At Risk (10/21/2024)    AUDIT-C     Frequency of Alcohol Consumption: Never     Average Number of Drinks: Patient does not drink     Frequency of Binge Drinking: Never       OBJECTIVE:     Vital Signs Range:  BMI Readings from Last 1 Encounters:   24 38.02 kg/m²       Temp:  [36.2 °C (97.1 °F)-36.7 °C (98.1 °F)]   Pulse:  [59-96]   Resp:  [14-18]   BP: ()/(43-79)   SpO2:  [96 %-99 %]        Significant Labs:        Component Value Date/Time    WBC 7.03 2024 0400    HGB 7.9 (L) 2024 0400    HCT 23.8 (L) 2024 0400    HCT 31 (L) 11/10/2024 0937     2024 0400     (L) 2024 0400    K 4.1 2024 0400     2024 0400    CO2 26 2024 0400     (H) 2024 0400    BUN 22 (H) 2024 0400    CREATININE 2.0 (H) 2024 0400    MG 2.3 2024 0400    PHOS 5.0 (H) 2024 0400    CALCIUM 9.2 2024 0400    ALBUMIN 3.3 (L) 2024 0400    PROT 6.9 2024 0400    ALKPHOS 71 2024 0400    BILITOT 0.4 2024 0400    AST 57 (H) 2024 0400    ALT 37 2024 0400    INR 1.0 08/10/2024 1057    HGBA1C 4.8 2020 0615        Please see Results Review for additional labs.     Diagnostic Studies: No relevant studies.    EKG:   Results for orders placed or performed during the hospital encounter of 24   EKG 12-lead    Collection Time: 24  4:36 AM   Result Value Ref Range    QRS Duration 96 ms    [facility] QTC Calculation 467 ms    Narrative    Test Reason : R07.9,    Vent. Rate : 071  BPM     Atrial Rate : 071 BPM     P-R Int : 160 ms          QRS Dur : 096 ms      QT Int : 430 ms       P-R-T Axes : 044 037 007 degrees     QTc Int : 467 ms    Normal sinus rhythm  Nonspecific T wave abnormality  Abnormal ECG  When compared with ECG of 10-NOV-2024 08:02,  Limb lead reversal No longer present  Confirmed by GALEN FISHER MD ([id number]) on 11/11/2024 1:22:56 PM    Referred By: [email or web address]   SELF           Confirmed By:GALEN FISHER MD       ECHO:  See subjective, if available.      ASSESSMENT/PLAN:                                                                                                                  11/13/2024  Nazanin Malone is a 44 y.o., female.      Pre-op Assessment          Review of Systems  Hematology/Oncology:       -- Anemia:               Hematology Comments: Sickle cell-beta thalassemia                    Cardiovascular:     Hypertension          PVD     Thrombosis of internal carotid, left                           Pulmonary:  Pneumonia  Asthma                    Neurological:    Neuromuscular Disease,                                   Endocrine:        Morbid Obesity / BMI > 40  Psych:  Psychiatric History                  Physical Exam  General: Cooperative, Alert and Oriented    Airway:  Mallampati: III / II        Anesthesia Plan  Type of Anesthesia, risks & benefits discussed:    Anesthesia Type: Gen Natural Airway  Intra-op Monitoring Plan: Standard ASA Monitors  Induction:  IV  Informed Consent: Informed consent signed with the Patient and all parties understand the risks and agree with anesthesia plan.  All questions answered.   ASA Score: 3  Day of Surgery Review of History & Physical: H&P Update referred to the surgeon/provider.    Ready For Surgery From Anesthesia Perspective.     .

## 2024-11-13 NOTE — ASSESSMENT & PLAN NOTE
"Thrombosis of internal carotid, left   This patient has long term use on an anticoagulant with Select Anticoagulant(s): Enoxaparin. Their long term anticoagulation will be Held or Continued: continued. They are on long term anticoagulation due to Reason for Anticoagulation: Thrombosis of L internal carotid .   - patient states she was previously on oral anticoagulants and they "didn't work"   - recently discharged on 10/31/2024 on lovenox inj BID  - continue while inpatient, pt frequently rejects, will continue to advise on need for AC   "

## 2024-11-13 NOTE — ASSESSMENT & PLAN NOTE
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 Most recent hemoglobin and hematocrit are listed below.  Recent Labs     11/11/24  0427 11/12/24  0400 11/13/24  0400   HGB 8.9* 8.3* 7.9*   HCT 27.2* 25.9* 23.8*       Plan  - Monitor serial CBC: Daily  - Transfuse PRBC if patient becomes hemodynamically unstable, symptomatic or H/H drops below 7/21.  - Patient has not received any PRBC transfusions to date  - Patient's anemia is currently stable

## 2024-11-13 NOTE — H&P
Short Stay Endoscopy History and Physical    PCP - Pee Montgomery MD     Procedure - EGD  ASA - per anesthesia  Mallampati - per anesthesia  History of Anesthesia problems - no  Family history Anesthesia problems -  no   Plan of anesthesia - General    HPI:  This is a 44 y.o. 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    Surgical History:  has a past surgical history that includes  section; Tubal ligation; and Tonsillectomy.    Family History: family history includes Diabetes in her mother; Heart disease in her father and mother.. Otherwise no colon cancer, inflammatory bowel disease, or GI malignancies.    Social History:  reports that she has never smoked. She has never used smokeless tobacco. She reports current drug use. Drug: Marijuana. She reports that she does not drink alcohol.    Review of patient's allergies indicates:   Allergen Reactions    Cat dander Anaphylaxis, Itching and Shortness Of Breath    Nsaids (non-steroidal anti-inflammatory drug) Itching and Anaphylaxis    Latex Rash       Medications:   Medications Prior to Admission   Medication Sig Dispense Refill Last Dose/Taking    acetaminophen (TYLENOL) 500 MG tablet Take 1,000 mg by mouth every 8 (eight) hours as needed for Pain.       albuterol  (VENTOLIN HFA) 90 mcg/actuation inhaler Inhale 2 puffs into the lungs every 6 (six) hours as needed for Wheezing or Shortness of Breath. Rescue 18 g 2     ALPRAZolam (XANAX) 0.25 MG tablet Take 1 tablet (0.25 mg total) by mouth 2 (two) times daily as needed for Anxiety. 20 tablet 0     amLODIPine (NORVASC) 10 MG tablet Take 1 tablet (10 mg total) by mouth once daily. 90 tablet 3     ascorbic acid (VITAMIN C ORAL) Take 1 capsule by mouth once daily.       enoxaparin (LOVENOX) 100 mg/mL Syrg Inject 1 mL (100 mg total) into the skin every 12 (twelve) hours. 60 mL 2     FLUoxetine 40 MG capsule Take 1 capsule (40 mg total) by mouth once daily. 90 capsule 1     fluticasone propionate (FLONASE) 50 mcg/actuation nasal spray INSTILL 1 SPRAY INTO EACH NOSTRIL EVERY DAY 16 mL 11     folic acid (FOLVITE) 1 MG tablet Take 1 tablet (1 mg total) by mouth once daily. 60 tablet 0     gabapentin (NEURONTIN) 300 MG capsule Take 2 capsules (600 mg total) by mouth 3 (three) times daily. 180 capsule 11     morphine (MS CONTIN) 30 MG 12 hr tablet Take 1 tablet (30 mg total) by mouth every 12 (twelve) hours. for 21 days 42 tablet 0     ondansetron (ZOFRAN-ODT) 4 MG TbDL Take 1 tablet (4 mg total) by mouth every 8 (eight) hours as needed. 15 tablet 0     [] oxyCODONE (ROXICODONE) 15 MG Tab Take 1 tablet (15 mg total) by mouth every 4 (four) hours as needed for Pain. 60 tablet 0     polyethylene glycol (GLYCOLAX) 17 gram/dose powder Use to cap to measure 17g, mix with liquid, and take by mouth once daily. 510 g 0     scopolamine (TRANSDERM-SCOP) 1.3-1.5 mg (1 mg over 3 days) Place 1 patch onto the skin Every 3 (three) days. 10 patch 0     senna-docusate 8.6-50 mg (PERICOLACE) 8.6-50 mg per tablet Take 1 tablet by mouth daily as needed for Constipation.       [] tiZANidine (ZANAFLEX) 4 MG tablet Take 1 tablet (4 mg total) by mouth every 8 (eight) hours. for 10 days 30 tablet 0        Physical Exam:    Vital Signs:   Vitals:     11/13/24 0953   BP: 134/77   Pulse: 73   Resp: 17   Temp: 97.7 °F (36.5 °C)       Labs:  Lab Results   Component Value Date    WBC 7.03 11/13/2024    HGB 7.9 (L) 11/13/2024    HCT 23.8 (L) 11/13/2024     11/13/2024    CHOL 128 12/22/2020    TRIG 92 12/22/2020    HDL 25 (L) 12/22/2020    ALT 37 11/13/2024    AST 57 (H) 11/13/2024     (L) 11/13/2024    K 4.1 11/13/2024     11/13/2024    CREATININE 2.0 (H) 11/13/2024    BUN 22 (H) 11/13/2024    CO2 26 11/13/2024    TSH 0.173 (L) 06/26/2024    INR 1.0 08/10/2024    HGBA1C 4.8 12/22/2020       I have explained the risks and benefits of endoscopy procedures to the patient including but not limited to bleeding, perforation, infection, and death.  The patient was asked if they understand and allowed to ask any further questions to their satisfaction.      Blake Castellanos MD

## 2024-11-13 NOTE — TRANSFER OF CARE
"Anesthesia Transfer of Care Note    Patient: Nazanin Encalade    Procedure(s) Performed: Procedure(s) (LRB):  EGD (ESOPHAGOGASTRODUODENOSCOPY) (N/A)    Patient location: PACU    Anesthesia Type: general    Transport from OR: Transported from OR on room air with adequate spontaneous ventilation    Post pain: adequate analgesia    Post assessment: no apparent anesthetic complications    Post vital signs: stable    Level of consciousness: awake    Nausea/Vomiting: no nausea/vomiting    Complications: none    Transfer of care protocol was followed    Last vitals: Visit Vitals  BP (!) 100/59   Pulse 82   Temp 36.9 °C (98.4 °F) (Temporal)   Resp 20   Ht 5' 2" (1.575 m)   Wt 94.3 kg (207 lb 14.3 oz)   LMP  (LMP Unknown)   SpO2 (!) 93%   Breastfeeding No   BMI 38.02 kg/m²     "

## 2024-11-13 NOTE — ASSESSMENT & PLAN NOTE
Diarrhea  - complaints of abd pain, n/v/d x2 d   - currently AFVSS, no leukocytosis  - mildly hypokalemic (replacing)   - stool studies pending   - had CT abd/pelvis done yesterday during previous ED presentation: numerous subcutaneous nodules along the anterior abdominal wall which have continued to increase in size and surrounding stranding across recent priors, likely sequela of serial injections however correlation for cellulitis advised, similar appearing fullness in the region of the gastric body, correlation with EGD advised  - dominant symptom now diarrhea, encourage oral hydration with Pedialyte to prevent dehydration  - scheduled bentyl   - prn analgesics  - prn antiemetics   - GI consulted for EGD evaluation of gastric fullness on CT, no acute findings, f/u biopsies on EGD

## 2024-11-13 NOTE — SUBJECTIVE & OBJECTIVE
Interval History: FLOR 2/2 hypotension associated with tizanidine and opioid regimen. Hypotension was fluid responsive. Given hypotension pt requests for advancing IV dilaudid dosing and frequency were denied.     Review of Systems   Constitutional:  Negative for activity change, appetite change and fever.   Respiratory:  Negative for cough, shortness of breath and wheezing.    Cardiovascular:  Negative for chest pain and leg swelling.   Gastrointestinal:  Positive for abdominal pain, diarrhea, nausea and vomiting. Negative for constipation.   Skin:  Negative for rash.   Neurological:  Negative for headaches.     Objective:     Vital Signs (Most Recent):  Temp: 96.3 °F (35.7 °C) (11/13/24 1530)  Pulse: 72 (11/13/24 1530)  Resp: 18 (11/13/24 1530)  BP: 110/72 (11/13/24 1530)  SpO2: 98 % (11/13/24 1530) Vital Signs (24h Range):  Temp:  [96.3 °F (35.7 °C)-98.7 °F (37.1 °C)] 96.3 °F (35.7 °C)  Pulse:  [59-96] 72  Resp:  [12-20] 18  SpO2:  [93 %-99 %] 98 %  BP: ()/(43-82) 110/72     Weight: 94.3 kg (207 lb 14.3 oz)  Body mass index is 38.02 kg/m².    Intake/Output Summary (Last 24 hours) at 11/13/2024 1717  Last data filed at 11/13/2024 0102  Gross per 24 hour   Intake 250 ml   Output --   Net 250 ml         Physical Exam  Vitals and nursing note reviewed.   HENT:      Head: Normocephalic and atraumatic.   Eyes:      Extraocular Movements: Extraocular movements intact.      Conjunctiva/sclera: Conjunctivae normal.   Cardiovascular:      Rate and Rhythm: Normal rate and regular rhythm.      Pulses: Normal pulses.   Pulmonary:      Effort: Pulmonary effort is normal. No respiratory distress.      Breath sounds: No wheezing or rales.   Abdominal:      Palpations: Abdomen is soft.      Tenderness: There is abdominal tenderness (epigastric). There is no guarding.   Musculoskeletal:      Right lower leg: No edema.      Left lower leg: No edema.   Skin:     General: Skin is warm and dry.   Neurological:      General: No  focal deficit present.      Mental Status: She is alert and oriented to person, place, and time.   Psychiatric:         Mood and Affect: Mood normal.             Significant Labs: All pertinent labs within the past 24 hours have been reviewed.    Significant Imaging: I have reviewed all pertinent imaging results/findings within the past 24 hours.

## 2024-11-13 NOTE — PROVATION PATIENT INSTRUCTIONS
Discharge Summary/Instructions after an Endoscopic Procedure  Patient Name: Nazanin Malone  Patient MRN: [id number]  Patient YOB: 1980 Wednesday, November 13, 2024  Allen Marshall MD  Dear patient,  As a result of recent federal legislation (The Federal Cures Act), you may   receive lab or pathology results from your procedure in your [facility]   account before your physician is able to contact you. Your physician or   their representative will relay the results to you with their   recommendations at their soonest availability.  Thank you,  RESTRICTIONS:  During your procedure today, you received medications for sedation.  These   medications may affect your judgment, balance and coordination.  Therefore,   for 24 hours, you have the following restrictions:   - DO NOT drive a car, operate machinery, make legal/financial decisions,   sign important papers or drink alcohol.    ACTIVITY:  Today: no heavy lifting, straining or running due to procedural   sedation/anesthesia.  The following day: return to full activity including work.  DIET:  Eat and drink normally unless instructed otherwise.     TREATMENT FOR COMMON SIDE EFFECTS:  - Mild abdominal pain, nausea, belching, bloating or excessive gas:  rest,   eat lightly and use a heating pad.  - Sore Throat: treat with throat lozenges and/or gargle with warm salt   water.  - Because air was used during the procedure, expelling large amounts of air   from your rectum or belching is normal.  - If a bowel prep was taken, you may not have a bowel movement for 1-3 days.    This is normal.  SYMPTOMS TO WATCH FOR AND REPORT TO YOUR PHYSICIAN:  1. Abdominal pain or bloating, other than gas cramps.  2. Chest pain.  3. Back pain.  4. Signs of infection such as: chills or fever occurring within 24 hours   after the procedure.  5. Rectal bleeding, which would show as bright red, maroon, or black stools.   (A tablespoon of blood from the rectum is not serious, especially  if   hemorrhoids are present.)  6. Vomiting.  7. Weakness or dizziness.  GO DIRECTLY TO THE NEAREST EMERGENCY ROOM IF YOU HAVE ANY OF THE FOLLOWING:      Difficulty breathing              Chills and/or fever over 101 F   Persistent vomiting and/or vomiting blood   Severe abdominal pain   Severe chest pain   Black, tarry stools   Bleeding- more than one tablespoon   Any other symptom or condition that you feel may need urgent attention  Your doctor recommends these additional instructions:  If any biopsies were taken, your doctors clinic will contact you in 1 to 2   weeks with any results.  - Await pathology results.   - Resume previous diet.   - Continue present medications.   - Return patient to hospital [facility] for ongoing care.  For questions, problems or results please call your physician - Allen Marshall MD at Work:  [phone].  [facility] EMERGENCY ROOM PHONE NUMBER: [phone]  IF A COMPLICATION OR EMERGENCY SITUATION ARISES AND YOU ARE UNABLE TO REACH   YOUR PHYSICIAN - GO DIRECTLY TO THE EMERGENCY ROOM.  MD Allen Mills MD  11/13/2024 11:27:40 AM  This report has been verified and signed electronically.  Dear patient,  As a result of recent federal legislation (The Federal Cures Act), you may   receive lab or pathology results from your procedure in your [facility]   account before your physician is able to contact you. Your physician or   their representative will relay the results to you with their   recommendations at their soonest availability.  Thank you,  PROVATION

## 2024-11-13 NOTE — PROGRESS NOTES
[facility] Medicine  Progress Note    Patient Name: Nazanin Malone  MRN: [id number]  Patient Class: IP- Inpatient   Admission Date: 11/11/2024  Length of Stay: 1 days  Attending Physician: Shlomo Albert MD  Primary Care Provider: Pee Montgomery MD        Subjective:     Principal Problem:Sickle cell anemia with crisis        HPI:  Nazanin Malone is a 44 y.o.F with hx of sickle cell, anemia, hypertension, SAMUEL presents to [facility] ED with complaints of severe abd pain/chest pain and diarrhea x2d. Patient stated she was seen here yesterday for abdominal pain and n/v. She was given antiemetics and symptoms improved so she was discharged home. Reports that when she got home, her pain continued and she then began having diarrhea. Reports about 8 episodes since she was discharged. Endorsing chest pain that she attributes to vomiting so much prior. Also reports the abdominal pain and chest pain are consistent with prior pain crises. Denies fever, chills, chest pain, palpitations, SOB, cough, abdominal pain, n/v/d, dysuria, headaches, or any other symptoms at this time.     In ED: AFVSS. CBC with stable, chronic anemia. Retic 1.4. K 3.3, bicarb 20, otherwise CMP unremarkable. BNP 39, troponin negative x2. CXR with no change of the chest since 11/10 (no acute abnormality). Stool studies pending. S/p maalox, dialudid inj x2, zofran, compazine, 1L IVF bolus. Admitted to  for further evaluation.     Overview/Hospital Course:  Pt with ongoing abdominal pain, uptitrated PO regimen and encouraged continued PO use in lieu of IV pain regimen. GI consulted for non-specific gastric soft tissue abnormality. Prior plans for OP follow up stymied by repeated admission for abdominal pain vs. sickle cell pain crises. EGD with mucosal atrophy and duodenal nodule both biopsied. No masses visualized in stomach. Hospital course complicated by FLOR 2/2 hypotension associated with tizanidine and opioid regimen. Given  hypotension pt requests for advancing IV dilaudid dosing and frequency were denied.     Interval History: FLOR 2/2 hypotension associated with tizanidine and opioid regimen. Hypotension was fluid responsive. Given hypotension pt requests for advancing IV dilaudid dosing and frequency were denied.     Review of Systems   Constitutional:  Negative for activity change, appetite change and fever.   Respiratory:  Negative for cough, shortness of breath and wheezing.    Cardiovascular:  Negative for chest pain and leg swelling.   Gastrointestinal:  Positive for abdominal pain, diarrhea, nausea and vomiting. Negative for constipation.   Skin:  Negative for rash.   Neurological:  Negative for headaches.     Objective:     Vital Signs (Most Recent):  Temp: 96.3 °F (35.7 °C) (11/13/24 1530)  Pulse: 72 (11/13/24 1530)  Resp: 18 (11/13/24 1530)  BP: 110/72 (11/13/24 1530)  SpO2: 98 % (11/13/24 1530) Vital Signs (24h Range):  Temp:  [96.3 °F (35.7 °C)-98.7 °F (37.1 °C)] 96.3 °F (35.7 °C)  Pulse:  [59-96] 72  Resp:  [12-20] 18  SpO2:  [93 %-99 %] 98 %  BP: ()/(43-82) 110/72     Weight: 94.3 kg (207 lb 14.3 oz)  Body mass index is 38.02 kg/m².    Intake/Output Summary (Last 24 hours) at 11/13/2024 1717  Last data filed at 11/13/2024 0102  Gross per 24 hour   Intake 250 ml   Output --   Net 250 ml         Physical Exam  Vitals and nursing note reviewed.   HENT:      Head: Normocephalic and atraumatic.   Eyes:      Extraocular Movements: Extraocular movements intact.      Conjunctiva/sclera: Conjunctivae normal.   Cardiovascular:      Rate and Rhythm: Normal rate and regular rhythm.      Pulses: Normal pulses.   Pulmonary:      Effort: Pulmonary effort is normal. No respiratory distress.      Breath sounds: No wheezing or rales.   Abdominal:      Palpations: Abdomen is soft.      Tenderness: There is abdominal tenderness (epigastric). There is no guarding.   Musculoskeletal:      Right lower leg: No edema.      Left lower leg:  No edema.   Skin:     General: Skin is warm and dry.   Neurological:      General: No focal deficit present.      Mental Status: She is alert and oriented to person, place, and time.   Psychiatric:         Mood and Affect: Mood normal.             Significant Labs: All pertinent labs within the past 24 hours have been reviewed.    Significant Imaging: I have reviewed all pertinent imaging results/findings within the past 24 hours.    Assessment/Plan:      * Sickle cell anemia with crisis  44 y.o.F with hx of sickle cell presents for complaints of chest pain, abdominal pain, diarrhea, N/V (now improved)   - AF, hypertensive, otherwise VSS  - CXR negative for acute process and similar to previous study completed yesterday.  - Labs with Hb 8.9 (~ BL), Retic 1.4  - s/p dilaudid inj x2 in ED, zofran x1, compazine x1, 1L bolus in ED  - Start pain regimen - scheduled tylenol, oxycodone prn 30mg, dilaudid 1mg q3hrs prn, will not advance per pt request, but did add one time 2mg IV dilaudid dose to be administered only if SBP>100. Continue home gabapentin and MS contin  - Continue folic acid  - Monitor with daily CBC  - Senna-doc BID when diarrhea improves  - Benadryl and Zofran PRN  - Wean IV pain meds as tolerated to oral as pain diminishes  - Transfuse for Hgb <7, <10 if acute chest syndrome    Diarrhea        Abdominal pain  Diarrhea  - complaints of abd pain, n/v/d x2 d   - currently AFVSS, no leukocytosis  - mildly hypokalemic (replacing)   - stool studies pending   - had CT abd/pelvis done yesterday during previous ED presentation: numerous subcutaneous nodules along the anterior abdominal wall which have continued to increase in size and surrounding stranding across recent priors, likely sequela of serial injections however correlation for cellulitis advised, similar appearing fullness in the region of the gastric body, correlation with EGD advised  - dominant symptom now diarrhea, encourage oral hydration with  "Pedialyte to prevent dehydration  - scheduled bentyl   - prn analgesics  - prn antiemetics   - GI consulted for EGD evaluation of gastric fullness on CT, no acute findings, f/u biopsies on EGD    Long term (current) use of anticoagulants  Thrombosis of internal carotid, left   This patient has long term use on an anticoagulant with Select Anticoagulant(s): Enoxaparin. Their long term anticoagulation will be Held or Continued: continued. They are on long term anticoagulation due to Reason for Anticoagulation: Thrombosis of L internal carotid .   - patient states she was previously on oral anticoagulants and they "didn't work"   - recently discharged on 10/31/2024 on lovenox inj BID  - continue while inpatient, pt frequently rejects, will continue to advise on need for AC     Thrombosis of internal carotid, left        Hypokalemia  Patient's most recent potassium results are listed below.   Recent Labs     11/11/24 0427 11/12/24 0400 11/13/24 0400   K 3.3* 3.9 4.1       Plan  - Replete potassium per protocol  - Monitor potassium Daily  - Patient's hypokalemia is stable    Anxiety  - continue home fluoxetine  - continue home prn xanax      Hypertension  Patients blood pressure range in the last 24 hours was: BP  Min: 78/43  Max: 200/90.The patient's inpatient anti-hypertensive regimen is listed below:  Current Antihypertensives  amLODIPine tablet 10 mg, Daily, Oral    Plan  - Holding today in setting of hypotension overnight  - Pt advised on not advancing IV dilaudid regimen in setting of meds interaction    Iron deficiency anemia  Anemia is likely due to Iron deficiency and chronic disease due to sickle cell . Most recent hemoglobin and hematocrit are listed below.  Recent Labs     11/11/24 0427 11/12/24 0400 11/13/24 0400   HGB 8.9* 8.3* 7.9*   HCT 27.2* 25.9* 23.8*       Plan  - Monitor serial CBC: Daily  - Transfuse PRBC if patient becomes hemodynamically unstable, symptomatic or H/H drops below 7/21.  - " Patient has not received any PRBC transfusions to date  - Patient's anemia is currently stable      VTE Risk Mitigation (From admission, onward)           Ordered     enoxaparin injection 100 mg  Every 12 hours         11/11/24 0808     Place sequential compression device  Until discontinued         11/11/24 0738     IP VTE HIGH RISK PATIENT  Once         11/11/24 0738                    Discharge Planning   ALYSE: 11/15/2024     Code Status: Full Code   Is the patient medically ready for discharge?:     Reason for patient still in hospital (select all that apply): Treatment                     Shlomo Albert MD  Department of Hospital Medicine   [facility]

## 2024-11-13 NOTE — ASSESSMENT & PLAN NOTE
Patient's most recent potassium results are listed below.   Recent Labs     11/11/24  0427 11/12/24  0400 11/13/24  0400   K 3.3* 3.9 4.1       Plan  - Replete potassium per protocol  - Monitor potassium Daily  - Patient's hypokalemia is stable

## 2024-11-13 NOTE — PLAN OF CARE
SW met w/ pt at bedside @3:55pm to complete DPA. Pt was resting, CM dept will attempt again at a later time.     ADRIEN Rinaldi   [facility]    Case Management Dept  [phone]

## 2024-11-13 NOTE — NURSING
Pt bp down to 80's / 40's after receiving oral pain meds with muscle relaxer zanaflex earlier tonight. She is very sleepy but arouses easily and responds to questions. States she just wants to get some good sleep before her procedure tomorrow.. hospital med on call notified. Order given for ns bolus. Bp 78/43 before start of bolus.will continue to monitor.

## 2024-11-13 NOTE — ASSESSMENT & PLAN NOTE
44 y.o.F with hx of sickle cell presents for complaints of chest pain, abdominal pain, diarrhea, N/V (now improved)   - AF, hypertensive, otherwise VSS  - CXR negative for acute process and similar to previous study completed yesterday.  - Labs with Hb 8.9 (~ BL), Retic 1.4  - s/p dilaudid inj x2 in ED, zofran x1, compazine x1, 1L bolus in ED  - Start pain regimen - scheduled tylenol, oxycodone prn 30mg, dilaudid 1mg q3hrs prn, will not advance per pt request, but did add one time 2mg IV dilaudid dose to be administered only if SBP>100. Continue home gabapentin and MS contin  - Continue folic acid  - Monitor with daily CBC  - Senna-doc BID when diarrhea improves  - Benadryl and Zofran PRN  - Wean IV pain meds as tolerated to oral as pain diminishes  - Transfuse for Hgb <7, <10 if acute chest syndrome

## 2024-11-14 LAB
ALBUMIN SERPL BCP-MCNC: 3.1 G/DL (ref 3.5–5.2)
ALP SERPL-CCNC: 68 U/L (ref 40–150)
ALT SERPL W/O P-5'-P-CCNC: 31 U/L (ref 10–44)
ANION GAP SERPL CALC-SCNC: 8 MMOL/L (ref 8–16)
AST SERPL-CCNC: 39 U/L (ref 10–40)
BASOPHILS # BLD AUTO: 0.05 K/UL (ref 0–0.2)
BASOPHILS NFR BLD: 0.7 % (ref 0–1.9)
BILIRUB SERPL-MCNC: 0.4 MG/DL (ref 0.1–1)
BUN SERPL-MCNC: 24 MG/DL (ref 6–20)
CALCIUM SERPL-MCNC: 8.7 MG/DL (ref 8.7–10.5)
CHLORIDE SERPL-SCNC: 105 MMOL/L (ref 95–110)
CO2 SERPL-SCNC: 23 MMOL/L (ref 23–29)
CREAT SERPL-MCNC: 1.7 MG/DL (ref 0.5–1.4)
DIFFERENTIAL METHOD BLD: ABNORMAL
EOSINOPHIL # BLD AUTO: 0.4 K/UL (ref 0–0.5)
EOSINOPHIL NFR BLD: 5.9 % (ref 0–8)
ERYTHROCYTE [DISTWIDTH] IN BLOOD BY AUTOMATED COUNT: 19 % (ref 11.5–14.5)
EST. GFR  (NO RACE VARIABLE): 37.2 ML/MIN/1.73 M^2
GLUCOSE SERPL-MCNC: 131 MG/DL (ref 70–110)
HCT VFR BLD AUTO: 22.7 % (ref 37–48.5)
HGB BLD-MCNC: 7.6 G/DL (ref 12–16)
IMM GRANULOCYTES # BLD AUTO: 0.01 K/UL (ref 0–0.04)
IMM GRANULOCYTES NFR BLD AUTO: 0.1 % (ref 0–0.5)
LYMPHOCYTES # BLD AUTO: 3 K/UL (ref 1–4.8)
LYMPHOCYTES NFR BLD: 39.4 % (ref 18–48)
MAGNESIUM SERPL-MCNC: 2.1 MG/DL (ref 1.6–2.6)
MCH RBC QN AUTO: 22.3 PG (ref 27–31)
MCHC RBC AUTO-ENTMCNC: 33.5 G/DL (ref 32–36)
MCV RBC AUTO: 67 FL (ref 82–98)
MONOCYTES # BLD AUTO: 0.7 K/UL (ref 0.3–1)
MONOCYTES NFR BLD: 9.2 % (ref 4–15)
NEUTROPHILS # BLD AUTO: 3.4 K/UL (ref 1.8–7.7)
NEUTROPHILS NFR BLD: 44.7 % (ref 38–73)
NRBC BLD-RTO: 3 /100 WBC
PHOSPHATE SERPL-MCNC: 4.6 MG/DL (ref 2.7–4.5)
PLATELET # BLD AUTO: 433 K/UL (ref 150–450)
PMV BLD AUTO: 9.6 FL (ref 9.2–12.9)
POTASSIUM SERPL-SCNC: 4.3 MMOL/L (ref 3.5–5.1)
PROT SERPL-MCNC: 6.4 G/DL (ref 6–8.4)
RBC # BLD AUTO: 3.41 M/UL (ref 4–5.4)
SODIUM SERPL-SCNC: 136 MMOL/L (ref 136–145)
WBC # BLD AUTO: 7.52 K/UL (ref 3.9–12.7)

## 2024-11-14 PROCEDURE — 87338 HPYLORI STOOL AG IA: CPT | Performed by: HOSPITALIST

## 2024-11-14 PROCEDURE — 25000003 PHARM REV CODE 250

## 2024-11-14 PROCEDURE — 82653 EL-1 FECAL QUANTITATIVE: CPT | Performed by: HOSPITALIST

## 2024-11-14 PROCEDURE — 36415 COLL VENOUS BLD VENIPUNCTURE: CPT

## 2024-11-14 PROCEDURE — 84100 ASSAY OF PHOSPHORUS: CPT

## 2024-11-14 PROCEDURE — 83993 ASSAY FOR CALPROTECTIN FECAL: CPT | Performed by: HOSPITALIST

## 2024-11-14 PROCEDURE — 85025 COMPLETE CBC W/AUTO DIFF WBC: CPT

## 2024-11-14 PROCEDURE — 63600175 PHARM REV CODE 636 W HCPCS

## 2024-11-14 PROCEDURE — 80053 COMPREHEN METABOLIC PANEL: CPT

## 2024-11-14 PROCEDURE — 82710 FATS/LIPIDS FECES QUANT: CPT | Performed by: HOSPITALIST

## 2024-11-14 PROCEDURE — 21400001 HC TELEMETRY ROOM

## 2024-11-14 PROCEDURE — 83735 ASSAY OF MAGNESIUM: CPT

## 2024-11-14 PROCEDURE — 87209 SMEAR COMPLEX STAIN: CPT | Performed by: HOSPITALIST

## 2024-11-14 PROCEDURE — 25000003 PHARM REV CODE 250: Performed by: STUDENT IN AN ORGANIZED HEALTH CARE EDUCATION/TRAINING PROGRAM

## 2024-11-14 RX ORDER — OXYCODONE HYDROCHLORIDE 15 MG/1
15 TABLET ORAL EVERY 4 HOURS PRN
Qty: 60 TABLET | Refills: 0 | Status: SHIPPED | OUTPATIENT
Start: 2024-11-14 | End: 2024-11-21 | Stop reason: SDUPTHER

## 2024-11-14 RX ORDER — LACTULOSE 10 G/15ML
15 SOLUTION ORAL 3 TIMES DAILY
Status: DISCONTINUED | OUTPATIENT
Start: 2024-11-14 | End: 2024-11-15 | Stop reason: HOSPADM

## 2024-11-14 RX ORDER — DIPHENHYDRAMINE HCL 25 MG
25 CAPSULE ORAL EVERY 6 HOURS PRN
Status: DISCONTINUED | OUTPATIENT
Start: 2024-11-14 | End: 2024-11-15 | Stop reason: HOSPADM

## 2024-11-14 RX ADMIN — LACTULOSE 15 G: 20 SOLUTION ORAL at 10:11

## 2024-11-14 RX ADMIN — LACTULOSE 15 G: 20 SOLUTION ORAL at 03:11

## 2024-11-14 RX ADMIN — DICYCLOMINE HYDROCHLORIDE 10 MG: 10 CAPSULE ORAL at 08:11

## 2024-11-14 RX ADMIN — GABAPENTIN 600 MG: 300 CAPSULE ORAL at 08:11

## 2024-11-14 RX ADMIN — SODIUM CHLORIDE, POTASSIUM CHLORIDE, SODIUM LACTATE AND CALCIUM CHLORIDE 500 ML: 600; 310; 30; 20 INJECTION, SOLUTION INTRAVENOUS at 06:11

## 2024-11-14 RX ADMIN — ACETAMINOPHEN 1000 MG: 500 TABLET ORAL at 08:11

## 2024-11-14 RX ADMIN — OXYCODONE HYDROCHLORIDE 30 MG: 10 TABLET ORAL at 12:11

## 2024-11-14 RX ADMIN — MORPHINE SULFATE 30 MG: 30 TABLET, FILM COATED, EXTENDED RELEASE ORAL at 08:11

## 2024-11-14 RX ADMIN — MUPIROCIN: 20 OINTMENT TOPICAL at 08:11

## 2024-11-14 RX ADMIN — TIZANIDINE 4 MG: 4 TABLET ORAL at 02:11

## 2024-11-14 RX ADMIN — DICYCLOMINE HYDROCHLORIDE 10 MG: 10 CAPSULE ORAL at 04:11

## 2024-11-14 RX ADMIN — FOLIC ACID 1 MG: 1 TABLET ORAL at 08:11

## 2024-11-14 RX ADMIN — MUPIROCIN: 20 OINTMENT TOPICAL at 10:11

## 2024-11-14 RX ADMIN — SODIUM CHLORIDE, POTASSIUM CHLORIDE, SODIUM LACTATE AND CALCIUM CHLORIDE: 600; 310; 30; 20 INJECTION, SOLUTION INTRAVENOUS at 02:11

## 2024-11-14 RX ADMIN — HYDROMORPHONE HYDROCHLORIDE 1 MG: 1 INJECTION, SOLUTION INTRAMUSCULAR; INTRAVENOUS; SUBCUTANEOUS at 10:11

## 2024-11-14 RX ADMIN — LACTULOSE 15 G: 20 SOLUTION ORAL at 08:11

## 2024-11-14 RX ADMIN — DIPHENHYDRAMINE HYDROCHLORIDE 25 MG: 50 INJECTION, SOLUTION INTRAMUSCULAR; INTRAVENOUS at 11:11

## 2024-11-14 RX ADMIN — Medication 250 MG: at 08:11

## 2024-11-14 RX ADMIN — DIPHENHYDRAMINE HYDROCHLORIDE 25 MG: 50 INJECTION, SOLUTION INTRAMUSCULAR; INTRAVENOUS at 03:11

## 2024-11-14 RX ADMIN — ONDANSETRON 8 MG: 8 TABLET, ORALLY DISINTEGRATING ORAL at 12:11

## 2024-11-14 RX ADMIN — GABAPENTIN 600 MG: 300 CAPSULE ORAL at 03:11

## 2024-11-14 RX ADMIN — DICYCLOMINE HYDROCHLORIDE 10 MG: 10 CAPSULE ORAL at 01:11

## 2024-11-14 RX ADMIN — ACETAMINOPHEN 1000 MG: 500 TABLET ORAL at 03:11

## 2024-11-14 RX ADMIN — HYDROMORPHONE HYDROCHLORIDE 1 MG: 1 INJECTION, SOLUTION INTRAMUSCULAR; INTRAVENOUS; SUBCUTANEOUS at 02:11

## 2024-11-14 RX ADMIN — Medication 200 ML: at 08:11

## 2024-11-14 RX ADMIN — Medication 200 ML: at 01:11

## 2024-11-14 RX ADMIN — AMLODIPINE BESYLATE 10 MG: 10 TABLET ORAL at 08:11

## 2024-11-14 RX ADMIN — HYDROMORPHONE HYDROCHLORIDE 1 MG: 1 INJECTION, SOLUTION INTRAMUSCULAR; INTRAVENOUS; SUBCUTANEOUS at 01:11

## 2024-11-14 RX ADMIN — FLUOXETINE HYDROCHLORIDE 40 MG: 20 CAPSULE ORAL at 08:11

## 2024-11-14 RX ADMIN — DIPHENHYDRAMINE HYDROCHLORIDE 25 MG: 25 CAPSULE ORAL at 11:11

## 2024-11-14 RX ADMIN — HYDROMORPHONE HYDROCHLORIDE 1 MG: 1 INJECTION, SOLUTION INTRAMUSCULAR; INTRAVENOUS; SUBCUTANEOUS at 11:11

## 2024-11-14 RX ADMIN — DIPHENHYDRAMINE HYDROCHLORIDE 25 MG: 50 INJECTION, SOLUTION INTRAMUSCULAR; INTRAVENOUS at 06:11

## 2024-11-14 NOTE — NURSING
Pt found in bed, talking on phone. Pt refused Lovenox 100 mg SQ. Pt received scheduled Morphine 30 mg at 2020. Pt instructed to wait one hour for prn pain medication, due to blood pressure being low, pt verbalized understanding.

## 2024-11-14 NOTE — PROGRESS NOTES
[facility] Medicine  Progress Note    Patient Name: Nazanin Malone  MRN: [id number]  Patient Class: IP- Inpatient   Admission Date: 11/11/2024  Length of Stay: 2 days  Attending Physician: Shlomo Albert MD  Primary Care Provider: Pee Montgomery MD        Subjective:     Principal Problem:Sickle cell anemia with crisis        HPI:  Nazanin Malone is a 44 y.o.F with hx of sickle cell, anemia, hypertension, SAMUEL presents to [facility] ED with complaints of severe abd pain/chest pain and diarrhea x2d. Patient stated she was seen here yesterday for abdominal pain and n/v. She was given antiemetics and symptoms improved so she was discharged home. Reports that when she got home, her pain continued and she then began having diarrhea. Reports about 8 episodes since she was discharged. Endorsing chest pain that she attributes to vomiting so much prior. Also reports the abdominal pain and chest pain are consistent with prior pain crises. Denies fever, chills, chest pain, palpitations, SOB, cough, abdominal pain, n/v/d, dysuria, headaches, or any other symptoms at this time.     In ED: AFVSS. CBC with stable, chronic anemia. Retic 1.4. K 3.3, bicarb 20, otherwise CMP unremarkable. BNP 39, troponin negative x2. CXR with no change of the chest since 11/10 (no acute abnormality). Stool studies pending. S/p maalox, dialudid inj x2, zofran, compazine, 1L IVF bolus. Admitted to  for further evaluation.     Overview/Hospital Course:  Pt with ongoing abdominal pain, uptitrated PO regimen and encouraged continued PO use in lieu of IV pain regimen. GI consulted for non-specific gastric soft tissue abnormality. Prior plans for OP follow up stymied by repeated admission for abdominal pain vs. sickle cell pain crises. EGD with mucosal atrophy and duodenal nodule both biopsied. No masses visualized in stomach. Hospital course complicated by FLOR 2/2 hypotension associated with tizanidine and opioid regimen. Given  hypotension pt requests for advancing IV dilaudid dosing and frequency were denied and risks of ongoing opioid-associated hypotension were explained to pt.     Interval History: NAEON, BP still soft today. FLOR improving. In shared decision making, will continue to monitor FLOR. Requests to uptitrate pain regimen in setting of ongoing hypotension were denied.     Review of Systems   Constitutional:  Negative for activity change, appetite change and fever.   Respiratory:  Negative for cough, shortness of breath and wheezing.    Cardiovascular:  Negative for chest pain and leg swelling.   Gastrointestinal:  Positive for abdominal pain, diarrhea, nausea and vomiting. Negative for constipation.   Skin:  Negative for rash.   Neurological:  Negative for headaches.     Objective:     Vital Signs (Most Recent):  Temp: 98.5 °F (36.9 °C) (11/14/24 1118)  Pulse: 90 (11/14/24 1504)  Resp: 18 (11/14/24 1338)  BP: 107/63 (11/14/24 1118)  SpO2: 98 % (11/14/24 1118) Vital Signs (24h Range):  Temp:  [96.3 °F (35.7 °C)-98.6 °F (37 °C)] 98.5 °F (36.9 °C)  Pulse:  [71-90] 90  Resp:  [18-20] 18  SpO2:  [95 %-98 %] 98 %  BP: ()/(61-77) 107/63     Weight: 94.3 kg (207 lb 14.3 oz)  Body mass index is 38.02 kg/m².    Intake/Output Summary (Last 24 hours) at 11/14/2024 1511  Last data filed at 11/14/2024 0852  Gross per 24 hour   Intake 900 ml   Output --   Net 900 ml         Physical Exam  Vitals and nursing note reviewed.   HENT:      Head: Normocephalic and atraumatic.   Eyes:      Extraocular Movements: Extraocular movements intact.      Conjunctiva/sclera: Conjunctivae normal.   Cardiovascular:      Rate and Rhythm: Normal rate and regular rhythm.      Pulses: Normal pulses.   Pulmonary:      Effort: Pulmonary effort is normal. No respiratory distress.      Breath sounds: No wheezing or rales.   Abdominal:      Palpations: Abdomen is soft.      Tenderness: There is abdominal tenderness (epigastric). There is no guarding.    Musculoskeletal:      Right lower leg: No edema.      Left lower leg: No edema.   Skin:     General: Skin is warm and dry.   Neurological:      General: No focal deficit present.      Mental Status: She is alert and oriented to person, place, and time.   Psychiatric:         Mood and Affect: Mood normal.             Significant Labs: All pertinent labs within the past 24 hours have been reviewed.    Significant Imaging: I have reviewed all pertinent imaging results/findings within the past 24 hours.    Assessment/Plan:      * Sickle cell anemia with crisis  44 y.o.F with hx of sickle cell presents for complaints of chest pain, abdominal pain, diarrhea, N/V (now improved)   - AF, hypertensive, otherwise VSS  - CXR negative for acute process and similar to previous study completed yesterday.  - Labs with Hb 8.9 (~ BL), Retic 1.4  - s/p dilaudid inj x2 in ED, zofran x1, compazine x1, 1L bolus in ED  - Start pain regimen - scheduled tylenol, oxycodone prn 30mg, dilaudid 1mg q3hrs prn, will not advance per pt request. Continue home gabapentin and MS contin  - Continue folic acid  - Monitor with daily CBC  - Senna-doc BID when diarrhea improves  - Benadryl and Zofran PRN  - Wean IV pain meds as tolerated to oral as pain diminishes  - Transfuse for Hgb <7, <10 if acute chest syndrome    Diarrhea        Abdominal pain  Diarrhea  - complaints of abd pain, n/v/d x2 d   - currently AFVSS, no leukocytosis  - mildly hypokalemic (replacing)   - stool studies pending   - had CT abd/pelvis done yesterday during previous ED presentation: numerous subcutaneous nodules along the anterior abdominal wall which have continued to increase in size and surrounding stranding across recent priors, likely sequela of serial injections however correlation for cellulitis advised, similar appearing fullness in the region of the gastric body, correlation with EGD advised  - dominant symptom now diarrhea, encourage oral hydration with Pedialyte to  "prevent dehydration  - scheduled bentyl   - prn analgesics  - prn antiemetics   - GI consulted for EGD evaluation of gastric fullness on CT, no acute findings, f/u biopsies on EGD    Long term (current) use of anticoagulants  Thrombosis of internal carotid, left   This patient has long term use on an anticoagulant with Select Anticoagulant(s): Enoxaparin. Their long term anticoagulation will be Held or Continued: continued. They are on long term anticoagulation due to Reason for Anticoagulation: Thrombosis of L internal carotid .   - patient states she was previously on oral anticoagulants and they "didn't work"   - recently discharged on 10/31/2024 on lovenox inj BID  - continue while inpatient, pt frequently rejects, will continue to advise on need for AC     Thrombosis of internal carotid, left        Hypokalemia  Patient's most recent potassium results are listed below.   Recent Labs     11/12/24 0400 11/13/24 0400 11/14/24  0327   K 3.9 4.1 4.3       Plan  - Replete potassium per protocol  - Monitor potassium Daily  - Patient's hypokalemia is stable    Anxiety  - continue home fluoxetine  - continue home prn xanax      Obesity (BMI 30-39.9)  Body mass index is 38.02 kg/m². Morbid obesity complicates all aspects of disease management from diagnostic modalities to treatment. Weight loss encouraged and health benefits explained to patient.         Hypertension  Patients blood pressure range in the last 24 hours was: BP  Min: 78/43  Max: 200/90.The patient's inpatient anti-hypertensive regimen is listed below:  Current Antihypertensives  amLODIPine tablet 10 mg, Daily, Oral    Plan  - Holding today in setting of hypotension overnight  - Pt advised on not advancing IV dilaudid regimen in setting of meds interaction    Iron deficiency anemia  Anemia is likely due to Iron deficiency and chronic disease due to sickle cell . Most recent hemoglobin and hematocrit are listed below.  Recent Labs     11/12/24 0400 " 11/13/24  0400 11/14/24  0327   HGB 8.3* 7.9* 7.6*   HCT 25.9* 23.8* 22.7*       Plan  - Monitor serial CBC: Daily  - Transfuse PRBC if patient becomes hemodynamically unstable, symptomatic or H/H drops below 7/21.  - Patient has not received any PRBC transfusions to date  - Patient's anemia is currently stable      VTE Risk Mitigation (From admission, onward)           Ordered     enoxaparin injection 100 mg  Every 12 hours         11/11/24 0808     Place sequential compression device  Until discontinued         11/11/24 0738     IP VTE HIGH RISK PATIENT  Once         11/11/24 0738                    Discharge Planning   ALYSE: 11/15/2024     Code Status: Full Code   Is the patient medically ready for discharge?:     Reason for patient still in hospital (select all that apply): Treatment  Discharge Plan A: Home with family                  Shlomo Albert MD  Department of Hospital Medicine   [facility]

## 2024-11-14 NOTE — SUBJECTIVE & OBJECTIVE
Interval History: NAEON, BP still soft today. FLOR improving. In shared decision making, will continue to monitor FLOR. Requests to uptitrate pain regimen in setting of ongoing hypotension were denied.     Review of Systems   Constitutional:  Negative for activity change, appetite change and fever.   Respiratory:  Negative for cough, shortness of breath and wheezing.    Cardiovascular:  Negative for chest pain and leg swelling.   Gastrointestinal:  Positive for abdominal pain, diarrhea, nausea and vomiting. Negative for constipation.   Skin:  Negative for rash.   Neurological:  Negative for headaches.     Objective:     Vital Signs (Most Recent):  Temp: 98.5 °F (36.9 °C) (11/14/24 1118)  Pulse: 90 (11/14/24 1504)  Resp: 18 (11/14/24 1338)  BP: 107/63 (11/14/24 1118)  SpO2: 98 % (11/14/24 1118) Vital Signs (24h Range):  Temp:  [96.3 °F (35.7 °C)-98.6 °F (37 °C)] 98.5 °F (36.9 °C)  Pulse:  [71-90] 90  Resp:  [18-20] 18  SpO2:  [95 %-98 %] 98 %  BP: ()/(61-77) 107/63     Weight: 94.3 kg (207 lb 14.3 oz)  Body mass index is 38.02 kg/m².    Intake/Output Summary (Last 24 hours) at 11/14/2024 1511  Last data filed at 11/14/2024 0852  Gross per 24 hour   Intake 900 ml   Output --   Net 900 ml         Physical Exam  Vitals and nursing note reviewed.   HENT:      Head: Normocephalic and atraumatic.   Eyes:      Extraocular Movements: Extraocular movements intact.      Conjunctiva/sclera: Conjunctivae normal.   Cardiovascular:      Rate and Rhythm: Normal rate and regular rhythm.      Pulses: Normal pulses.   Pulmonary:      Effort: Pulmonary effort is normal. No respiratory distress.      Breath sounds: No wheezing or rales.   Abdominal:      Palpations: Abdomen is soft.      Tenderness: There is abdominal tenderness (epigastric). There is no guarding.   Musculoskeletal:      Right lower leg: No edema.      Left lower leg: No edema.   Skin:     General: Skin is warm and dry.   Neurological:      General: No focal deficit  present.      Mental Status: She is alert and oriented to person, place, and time.   Psychiatric:         Mood and Affect: Mood normal.             Significant Labs: All pertinent labs within the past 24 hours have been reviewed.    Significant Imaging: I have reviewed all pertinent imaging results/findings within the past 24 hours.

## 2024-11-14 NOTE — ASSESSMENT & PLAN NOTE
44 y.o.F with hx of sickle cell presents for complaints of chest pain, abdominal pain, diarrhea, N/V (now improved)   - AF, hypertensive, otherwise VSS  - CXR negative for acute process and similar to previous study completed yesterday.  - Labs with Hb 8.9 (~ BL), Retic 1.4  - s/p dilaudid inj x2 in ED, zofran x1, compazine x1, 1L bolus in ED  - Start pain regimen - scheduled tylenol, oxycodone prn 30mg, dilaudid 1mg q3hrs prn, will not advance per pt request. Continue home gabapentin and MS contin  - Continue folic acid  - Monitor with daily CBC  - Senna-doc BID when diarrhea improves  - Benadryl and Zofran PRN  - Wean IV pain meds as tolerated to oral as pain diminishes  - Transfuse for Hgb <7, <10 if acute chest syndrome

## 2024-11-14 NOTE — ASSESSMENT & PLAN NOTE
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 Most recent hemoglobin and hematocrit are listed below.  Recent Labs     11/12/24  0400 11/13/24  0400 11/14/24  0327   HGB 8.3* 7.9* 7.6*   HCT 25.9* 23.8* 22.7*       Plan  - Monitor serial CBC: Daily  - Transfuse PRBC if patient becomes hemodynamically unstable, symptomatic or H/H drops below 7/21.  - Patient has not received any PRBC transfusions to date  - Patient's anemia is currently stable

## 2024-11-14 NOTE — ASSESSMENT & PLAN NOTE
Patient's most recent potassium results are listed below.   Recent Labs     11/12/24  0400 11/13/24  0400 11/14/24  0327   K 3.9 4.1 4.3       Plan  - Replete potassium per protocol  - Monitor potassium Daily  - Patient's hypokalemia is stable

## 2024-11-14 NOTE — ANESTHESIA POSTPROCEDURE EVALUATION
Anesthesia Post Evaluation    Patient: Nazanin Encalade    Procedure(s) Performed: Procedure(s) (LRB):  EGD (ESOPHAGOGASTRODUODENOSCOPY) (N/A)    Final Anesthesia Type: general      Patient location during evaluation: PACU  Patient participation: Yes- Able to Participate  Level of consciousness: awake and alert  Post-procedure vital signs: reviewed and stable  Pain management: adequate  Airway patency: patent    PONV status at discharge: No PONV  Anesthetic complications: no      Cardiovascular status: blood pressure returned to baseline  Respiratory status: unassisted  Hydration status: euvolemic  Follow-up not needed.              Vitals Value Taken Time   BP 91/61 11/14/24 0501   Temp 36.7 °C (98 °F) 11/14/24 0501   Pulse 80 11/14/24 0501   Resp 18 11/14/24 0501   SpO2 98 % 11/14/24 0501         Event Time   Out of Recovery 11:46:02         Pain/Olivia Score: Pain Rating Prior to Med Admin: 8 (11/14/2024  2:19 AM)  Pain Rating Post Med Admin: 1 (11/14/2024  2:47 AM)  Olivia Score: 9 (11/13/2024 11:30 AM)

## 2024-11-14 NOTE — PLAN OF CARE
[facility]  Initial Discharge Assessment       Primary Care Provider: Pee Montgomery MD    Admission Diagnosis: Nausea [R11.0]  Sickle cell anemia with crisis [D57.00]  Sickle cell pain crisis [D57.00]  Chest pain [R07.9]    Admission Date: 11/11/2024  Expected Discharge Date: 11/15/2024    Transition of Care Barriers: (P) None    Payor: [employer] MANAGED MEDICARE / Plan: [employer] MEDICARE PLAN HMO / Product Type: Medicare Advantage /     Extended Emergency Contact Information  Primary Emergency Contact: Jack Malone  Mobile Phone: [phone]  Relation: Brother    Discharge Plan A: Home with family  Discharge Plan B: Home, Home with family      [facility]  [address]  [facility] [address]  Phone: [phone] Fax: [phone]    [facility]  [address] [facility] [address]  [facility] [address]  Phone: [phone] Fax: [phone]      Initial Assessment (most recent)       Adult Discharge Assessment - 11/14/24 1131          Discharge Assessment    Assessment Type Discharge Planning Assessment     Confirmed/corrected address, phone number and insurance Yes     Confirmed Demographics Correct on Facesheet     Source of Information patient     When was your last doctors appointment? 10/08/24     Communicated ALYSE with patient/caregiver Yes     Reason For Admission Sickle cell crisis     People in Home child(jayce), adult;grandchild(jayce);parent(s)     Do you expect to return to your current living situation? Yes     Do you have help at home or someone to help you manage your care at home? Yes     Who are your caregiver(s) and their phone number(s)? Parents, family members     Prior to hospitilization cognitive status: Alert/Oriented     Current cognitive status: Alert/Oriented     Walking or Climbing Stairs Difficulty no     Dressing/Bathing Difficulty no     Home Accessibility stairs to enter home     Number of Stairs, Main Entrance one     Equipment Currently Used at Home  none     Patient currently being followed by outpatient case management? No     Do you currently have service(s) that help you manage your care at home? No     Do you take prescription medications? Yes     Do you have prescription coverage? Yes     Coverage [employer] Managed Medicare/ Medicaid     Do you have any problems affording any of your prescribed medications? No     Is the patient taking medications as prescribed? yes     Who is going to help you get home at discharge? Parents, family members     How do you get to doctors appointments? car, drives self     Are you on dialysis? No     Do you take coumadin? No     Discharge Plan A Home with family     Discharge Plan B Home;Home with family     DME Needed Upon Discharge  none     Discharge Plan discussed with: Parent(s)     Transition of Care Barriers None (P)         Physical Activity    On average, how many minutes do you engage in exercise at this level? 0 min (P)         Financial Resource Strain    How hard is it for you to pay for the very basics like food, housing, medical care, and heating? Not hard at all (P)         Housing Stability    In the last 12 months, was there a time when you were not able to pay the mortgage or rent on time? No (P)      At any time in the past 12 months, were you homeless or living in a shelter (including now)? No (P)         Transportation Needs    Has the lack of transportation kept you from medical appointments, meetings, work or from getting things needed for daily living? No (P)         Food Insecurity    Within the past 12 months, you worried that your food would run out before you got the money to buy more. Never true (P)      Within the past 12 months, the food you bought just didn't last and you didn't have money to get more. Never true (P)         Stress    Do you feel stress - tense, restless, nervous, or anxious, or unable to sleep at night because your mind is troubled all the time - these days? Not at all (P)          Social Isolation    How often do you feel lonely or isolated from those around you?  Rarely (P)         Alcohol Use    Q1: How often do you have a drink containing alcohol? Never (P)      Q2: How many drinks containing alcohol do you have on a typical day when you are drinking? Patient does not drink (P)      Q3: How often do you have six or more drinks on one occasion? Never (P)         Utilities    In the past 12 months has the electric, gas, oil, or water company threatened to shut off services in your home? No (P)         Health Literacy    How often do you need to have someone help you when you read instructions, pamphlets, or other written material from your doctor or pharmacy? Never (P)         OTHER    Name(s) of People in Home Parent/family (P)                       CM completed discharge assessment via medical records due to pt was asleep at the time of interview. Pt lives at home with parents and adult children.  There are 2 steps to enter home and pt is independent with ambulation and all ADLs. Last pt appt was 10/8 at [facility] in [facility] where pt drives her self at times for MD visit.    Pt at this time has not needs and is agreeable to discharge plan. Discharge planning pamphlet was left at bedside for additional information.    Discharge Plan A and Plan B have been determined by review of patient's clinical status, future medical and therapeutic needs, and coverage/benefits for post-acute care in coordination with multidisciplinary team members.     Brittany Perea, RN  - [facility]  [phone]

## 2024-11-14 NOTE — ASSESSMENT & PLAN NOTE
Body mass index is 38.02 kg/m². Morbid obesity complicates all aspects of disease management from diagnostic modalities to treatment. Weight loss encouraged and health benefits explained to patient.

## 2024-11-15 VITALS
WEIGHT: 207.88 LBS | HEIGHT: 62 IN | OXYGEN SATURATION: 97 % | RESPIRATION RATE: 18 BRPM | TEMPERATURE: 98 F | HEART RATE: 93 BPM | SYSTOLIC BLOOD PRESSURE: 148 MMHG | DIASTOLIC BLOOD PRESSURE: 80 MMHG | BODY MASS INDEX: 38.25 KG/M2

## 2024-11-15 LAB
ANION GAP SERPL CALC-SCNC: 4 MMOL/L (ref 8–16)
BASOPHILS # BLD AUTO: 0.04 K/UL (ref 0–0.2)
BASOPHILS NFR BLD: 0.5 % (ref 0–1.9)
BUN SERPL-MCNC: 16 MG/DL (ref 6–20)
CALCIUM SERPL-MCNC: 9.2 MG/DL (ref 8.7–10.5)
CHLORIDE SERPL-SCNC: 108 MMOL/L (ref 95–110)
CO2 SERPL-SCNC: 28 MMOL/L (ref 23–29)
CREAT SERPL-MCNC: 1.3 MG/DL (ref 0.5–1.4)
DIFFERENTIAL METHOD BLD: ABNORMAL
EOSINOPHIL # BLD AUTO: 0.5 K/UL (ref 0–0.5)
EOSINOPHIL NFR BLD: 6.5 % (ref 0–8)
ERYTHROCYTE [DISTWIDTH] IN BLOOD BY AUTOMATED COUNT: 19.1 % (ref 11.5–14.5)
EST. GFR  (NO RACE VARIABLE): 51.4 ML/MIN/1.73 M^2
GLUCOSE SERPL-MCNC: 79 MG/DL (ref 70–110)
HCT VFR BLD AUTO: 24 % (ref 37–48.5)
HGB BLD-MCNC: 8.3 G/DL (ref 12–16)
IMM GRANULOCYTES # BLD AUTO: 0.02 K/UL (ref 0–0.04)
IMM GRANULOCYTES NFR BLD AUTO: 0.3 % (ref 0–0.5)
LYMPHOCYTES # BLD AUTO: 2.8 K/UL (ref 1–4.8)
LYMPHOCYTES NFR BLD: 37.5 % (ref 18–48)
MCH RBC QN AUTO: 22.8 PG (ref 27–31)
MCHC RBC AUTO-ENTMCNC: 34.6 G/DL (ref 32–36)
MCV RBC AUTO: 66 FL (ref 82–98)
MONOCYTES # BLD AUTO: 0.8 K/UL (ref 0.3–1)
MONOCYTES NFR BLD: 10.1 % (ref 4–15)
NEUTROPHILS # BLD AUTO: 3.4 K/UL (ref 1.8–7.7)
NEUTROPHILS NFR BLD: 45.1 % (ref 38–73)
NRBC BLD-RTO: 2 /100 WBC
PLATELET # BLD AUTO: 480 K/UL (ref 150–450)
PMV BLD AUTO: 9.3 FL (ref 9.2–12.9)
POTASSIUM SERPL-SCNC: 4.4 MMOL/L (ref 3.5–5.1)
RBC # BLD AUTO: 3.64 M/UL (ref 4–5.4)
SODIUM SERPL-SCNC: 140 MMOL/L (ref 136–145)
WBC # BLD AUTO: 7.51 K/UL (ref 3.9–12.7)

## 2024-11-15 PROCEDURE — 80048 BASIC METABOLIC PNL TOTAL CA: CPT

## 2024-11-15 PROCEDURE — 63600175 PHARM REV CODE 636 W HCPCS

## 2024-11-15 PROCEDURE — 25000003 PHARM REV CODE 250

## 2024-11-15 PROCEDURE — 85025 COMPLETE CBC W/AUTO DIFF WBC: CPT

## 2024-11-15 RX ORDER — TIZANIDINE 4 MG/1
4 TABLET ORAL EVERY 8 HOURS PRN
Qty: 90 TABLET | Refills: 0 | Status: SHIPPED | OUTPATIENT
Start: 2024-11-15

## 2024-11-15 RX ORDER — HYDROXYZINE PAMOATE 25 MG/1
25 CAPSULE ORAL EVERY 4 HOURS PRN
Qty: 120 CAPSULE | Refills: 0 | Status: SHIPPED | OUTPATIENT
Start: 2024-11-15

## 2024-11-15 RX ORDER — HEPARIN SODIUM,PORCINE/PF 10 UNIT/ML
10 SYRINGE (ML) INTRAVENOUS
Status: DISCONTINUED | OUTPATIENT
Start: 2024-11-15 | End: 2024-11-15

## 2024-11-15 RX ORDER — HEPARIN 100 UNIT/ML
500 SYRINGE INTRAVENOUS
Status: DISCONTINUED | OUTPATIENT
Start: 2024-11-15 | End: 2024-11-15 | Stop reason: HOSPADM

## 2024-11-15 RX ORDER — PROMETHAZINE HYDROCHLORIDE 12.5 MG/1
25 TABLET ORAL 4 TIMES DAILY
Qty: 56 TABLET | Refills: 0 | Status: SHIPPED | OUTPATIENT
Start: 2024-11-15

## 2024-11-15 RX ORDER — MORPHINE SULFATE 30 MG/1
30 TABLET, FILM COATED, EXTENDED RELEASE ORAL EVERY 12 HOURS
Qty: 42 TABLET | Refills: 0 | Status: SHIPPED | OUTPATIENT
Start: 2024-11-15 | End: 2024-11-21 | Stop reason: SDUPTHER

## 2024-11-15 RX ADMIN — GABAPENTIN 600 MG: 300 CAPSULE ORAL at 02:11

## 2024-11-15 RX ADMIN — ACETAMINOPHEN 1000 MG: 500 TABLET ORAL at 09:11

## 2024-11-15 RX ADMIN — HEPARIN 500 UNITS: 100 SYRINGE at 02:11

## 2024-11-15 RX ADMIN — DICYCLOMINE HYDROCHLORIDE 10 MG: 10 CAPSULE ORAL at 02:11

## 2024-11-15 RX ADMIN — HYDROMORPHONE HYDROCHLORIDE 1 MG: 1 INJECTION, SOLUTION INTRAMUSCULAR; INTRAVENOUS; SUBCUTANEOUS at 10:11

## 2024-11-15 RX ADMIN — ACETAMINOPHEN 1000 MG: 500 TABLET ORAL at 02:11

## 2024-11-15 RX ADMIN — Medication 250 MG: at 09:11

## 2024-11-15 RX ADMIN — GABAPENTIN 600 MG: 300 CAPSULE ORAL at 09:11

## 2024-11-15 RX ADMIN — OXYCODONE HYDROCHLORIDE 30 MG: 10 TABLET ORAL at 12:11

## 2024-11-15 RX ADMIN — OXYCODONE HYDROCHLORIDE 30 MG: 10 TABLET ORAL at 08:11

## 2024-11-15 RX ADMIN — MORPHINE SULFATE 30 MG: 30 TABLET, FILM COATED, EXTENDED RELEASE ORAL at 09:11

## 2024-11-15 RX ADMIN — FOLIC ACID 1 MG: 1 TABLET ORAL at 09:11

## 2024-11-15 RX ADMIN — LACTULOSE 15 G: 20 SOLUTION ORAL at 02:11

## 2024-11-15 RX ADMIN — DICYCLOMINE HYDROCHLORIDE 10 MG: 10 CAPSULE ORAL at 09:11

## 2024-11-15 RX ADMIN — FLUOXETINE HYDROCHLORIDE 40 MG: 20 CAPSULE ORAL at 09:11

## 2024-11-15 RX ADMIN — AMLODIPINE BESYLATE 10 MG: 10 TABLET ORAL at 09:11

## 2024-11-15 RX ADMIN — HYDROMORPHONE HYDROCHLORIDE 1 MG: 1 INJECTION, SOLUTION INTRAMUSCULAR; INTRAVENOUS; SUBCUTANEOUS at 06:11

## 2024-11-15 NOTE — PLAN OF CARE
Problem: Adult Inpatient Plan of Care  Goal: Plan of Care Review  Outcome: Progressing  Goal: Patient-Specific Goal (Individualized)  Outcome: Progressing  Goal: Absence of Hospital-Acquired Illness or Injury  Outcome: Progressing  Goal: Optimal Comfort and Wellbeing  Outcome: Progressing  Goal: Readiness for Transition of Care  Outcome: Progressing     Problem: Pneumonia  Goal: Fluid Balance  Outcome: Progressing  Goal: Resolution of Infection Signs and Symptoms  Outcome: Progressing  Goal: Effective Oxygenation and Ventilation  Outcome: Progressing     Problem: Infection  Goal: Absence of Infection Signs and Symptoms  Outcome: Progressing     Problem: Fall Injury Risk  Goal: Absence of Fall and Fall-Related Injury  Outcome: Progressing

## 2024-11-15 NOTE — PLAN OF CARE
Problem: Adult Inpatient Plan of Care  Goal: Plan of Care Review  Outcome: Progressing  Goal: Patient-Specific Goal (Individualized)  Outcome: Progressing  Goal: Absence of Hospital-Acquired Illness or Injury  Outcome: Progressing  Goal: Optimal Comfort and Wellbeing  Outcome: Progressing  Goal: Readiness for Transition of Care  Outcome: Progressing     Problem: Pneumonia  Goal: Fluid Balance  Outcome: Progressing  Goal: Resolution of Infection Signs and Symptoms  Outcome: Progressing  Goal: Effective Oxygenation and Ventilation  Outcome: Progressing     Problem: Infection  Goal: Absence of Infection Signs and Symptoms  Outcome: Progressing     Problem: Fall Injury Risk  Goal: Absence of Fall and Fall-Related Injury  Outcome: Progressing    AAOx4, pain medication administered, 500ml Bolus given, bed in lowest position,  locked, side rails up x 2 and call light within reach.

## 2024-11-15 NOTE — SUBJECTIVE & OBJECTIVE
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 CESAR completed after reported BRBPR. No gross abnormal bleeding on exam.     Review of Systems   Constitutional:  Negative for activity change, appetite change and fever.   Respiratory:  Negative for cough, shortness of breath and wheezing.    Cardiovascular:  Negative for chest pain and leg swelling.   Gastrointestinal:  Positive for blood in stool. Negative for abdominal pain, constipation, diarrhea, nausea and vomiting.   Skin:  Negative for rash.   Neurological:  Negative for headaches.     Objective:     Vital Signs (Most Recent):  Temp: 97.9 °F (36.6 °C) (11/15/24 0700)  Pulse: 93 (11/15/24 1119)  Resp: 18 (11/15/24 1223)  BP: (!) 148/80 (11/15/24 1117)  SpO2: 97 % (11/15/24 1117) Vital Signs (24h Range):  Temp:  [97.6 °F (36.4 °C)-98.9 °F (37.2 °C)] 97.9 °F (36.6 °C)  Pulse:  [69-98] 93  Resp:  [15-18] 18  SpO2:  [95 %-100 %] 97 %  BP: ()/(53-82) 148/80     Weight: 94.3 kg (207 lb 14.3 oz)  Body mass index is 38.02 kg/m².    Intake/Output Summary (Last 24 hours) at 11/15/2024 1540  Last data filed at 11/15/2024 0530  Gross per 24 hour   Intake 482 ml   Output --   Net 482 ml         Physical Exam  Vitals and nursing note reviewed.   HENT:      Head: Normocephalic and atraumatic.   Eyes:      Extraocular Movements: Extraocular movements intact.      Conjunctiva/sclera: Conjunctivae normal.   Cardiovascular:      Rate and Rhythm: Normal rate and regular rhythm.      Pulses: Normal pulses.   Pulmonary:      Effort: Pulmonary effort is normal. No respiratory distress.      Breath sounds: No wheezing or rales.   Abdominal:      Palpations: Abdomen is soft.      Tenderness: There is abdominal tenderness (epigastric). There is no guarding.   Musculoskeletal:      Right lower leg: No edema.      Left lower leg: No edema.   Skin:     General: Skin is warm and dry.    Neurological:      General: No focal deficit present.      Mental Status: She is alert and oriented to person, place, and time.   Psychiatric:         Mood and Affect: Mood normal.             Significant Labs: All pertinent labs within the past 24 hours have been reviewed.    Significant Imaging: I have reviewed all pertinent imaging results/findings within the past 24 hours.

## 2024-11-15 NOTE — NURSING
"Patient is calling hourly for medications. She is being told her blood pressure is too low for pain medications and she insists on rechecks. "Can you take it on my other arm, can you take it on my leg?" When I left the room she called in a different nurse and asked her to take the blood pressure as well. Patient is extremely drowsy, looses track of the conversation and falls asleep with staff in the room speaking with her.     Patient asked for meds to be given together even after being told her blood pressure is too low, she just asks for it to be rechecked so that she can get her pain medication.       "

## 2024-11-15 NOTE — PLAN OF CARE
[facility]  Discharge Final Note    Primary Care Provider: Pee Montgomery MD    Expected Discharge Date: 11/15/2024    Pt to d/c home with no needs     Future Appointments   Date Time Provider Department Center   11/21/2024  7:30 AM [facility]   11/21/2024  8:40 AM Jrared Clark MD [facility]         Final Discharge Note (most recent)       Final Note - 11/15/24 1336          Final Note    Assessment Type Final Discharge Note (P)      Anticipated Discharge Disposition Home or Self Care (P)         Post-Acute Status    Post-Acute Authorization Other (P)      Other Status No Post-Acute Service Needs (P)      Discharge Delays None known at this time (P)                    Mackenzie Wong CD, MSW, LMSW, RSW   Case Management  [facility]  Email: [email or web address]    Important Message from Medicare  Important Message from Medicare regarding Discharge Appeal Rights: Signed/date by patient/caregiver, Explained to patient/caregiver, Given to patient/caregiver     Date IMM was signed: 11/14/24  Time IMM was signed: 1011

## 2024-11-15 NOTE — NURSING
Discharge orders reviewed with patient. Patient received a copy and verbalized understanding. Patient awaiting transport to garage to her personal transpiration.

## 2024-11-15 NOTE — PLAN OF CARE
Hospital Medicine  Plan of Care Note      Notified of patient requesting more pain medications, despite softer BPs and appearing intermittently somnolent.     On chart review, this has been an issue throughout the day.     Encouraged RN to continue to hold opiate pain medications as needed for somnolence and hypotension.     Also changed IV Benadryl to PO per society recommendations as she is tolerating oral medications, and as to avoid known complications of IV Benadryl (compared to PO) including oversedation, prolonged hospitalization, etc.     Joce Vyas MD  [facility]

## 2024-11-15 NOTE — NURSING
Responded to pt's call light. Pt was asking nurse to retake blood pressure b/c she wanted her pain medication. BP taken twice and asked to take BP on each arm. Pt fell asleep and was lethargic when taking vitals as pt's request. Let pt know that unfortunately her BP is still too low to safely administer pain medication. Pt understood and stated she will wait.

## 2024-11-15 NOTE — PLAN OF CARE
Patient being discharged.  Problem: Adult Inpatient Plan of Care  Goal: Plan of Care Review  Outcome: Met  Goal: Patient-Specific Goal (Individualized)  Outcome: Met  Goal: Absence of Hospital-Acquired Illness or Injury  Outcome: Met  Goal: Optimal Comfort and Wellbeing  Outcome: Met  Goal: Readiness for Transition of Care  Outcome: Met     Problem: Pneumonia  Goal: Fluid Balance  Outcome: Met  Goal: Resolution of Infection Signs and Symptoms  Outcome: Met  Goal: Effective Oxygenation and Ventilation  Outcome: Met     Problem: Infection  Goal: Absence of Infection Signs and Symptoms  Outcome: Met     Problem: Fall Injury Risk  Goal: Absence of Fall and Fall-Related Injury  Outcome: Met

## 2024-11-15 NOTE — DISCHARGE SUMMARY
[facility] Medicine  Discharge Summary      Patient Name: Nazanin Malone  MRN: [id number]  VLADIMIR: [id number]  Patient Class: IP- Inpatient  Admission Date: 11/11/2024  Hospital Length of Stay: 3 days  Discharge Date and Time:  11/15/2024 3:43 PM  Attending Physician: Shlomo Albert MD   Discharging Provider: Shlomo Albert MD  Primary Care Provider: Pee Montgomery MD  [facility] Medicine Team: [facility] Shlomo Albert MD  Primary Care Team: [facility]    HPI:   Nazanin Malone is a 44 y.o.F with hx of sickle cell, anemia, hypertension, SAMUEL presents to [facility] ED with complaints of severe abd pain/chest pain and diarrhea x2d. Patient stated she was seen here yesterday for abdominal pain and n/v. She was given antiemetics and symptoms improved so she was discharged home. Reports that when she got home, her pain continued and she then began having diarrhea. Reports about 8 episodes since she was discharged. Endorsing chest pain that she attributes to vomiting so much prior. Also reports the abdominal pain and chest pain are consistent with prior pain crises. Denies fever, chills, chest pain, palpitations, SOB, cough, abdominal pain, n/v/d, dysuria, headaches, or any other symptoms at this time.     In ED: AFVSS. CBC with stable, chronic anemia. Retic 1.4. K 3.3, bicarb 20, otherwise CMP unremarkable. BNP 39, troponin negative x2. CXR with no change of the chest since 11/10 (no acute abnormality). Stool studies pending. S/p maalox, dialudid inj x2, zofran, compazine, 1L IVF bolus. Admitted to  for further evaluation.     Procedure(s) (LRB):  EGD (ESOPHAGOGASTRODUODENOSCOPY) (N/A)      Hospital Course:   Pt with ongoing abdominal pain, uptitrated PO regimen and encouraged continued PO use in lieu of IV pain regimen. GI consulted for non-specific gastric soft tissue abnormality. Prior plans for OP follow up stymied by repeated admission for abdominal pain vs. sickle cell pain crises.  EGD with mucosal atrophy and duodenal nodule both biopsied. No masses visualized in stomach. Hospital course complicated by FLOR 2/2 hypotension associated with tizanidine and opioid regimen. Given hypotension pt requests for advancing IV dilaudid dosing and frequency were denied and risks of ongoing opioid-associated hypotension were explained to pt. BP recovered during course of admission, but pt reported 2 BRBPR with some blood coating stool. CESAR at bedside revealed one external hemorrhoid, and no blood on glove after CESAR. No further episodes of BRBPR afterwards and Hgb and vitals stable. Advised continued monitoring for any recurrence to facilitate pt requested DC. Pt discharged to home with refill of home opioids to last until upcoming Heme/Onc follow up on 11/21 along with requested tizanidine for muscle spasms, and promethazine for N/V.      Goals of Care Treatment Preferences:  Code Status: Full Code      SDOH Screening:  The patient was screened for utility difficulties, food insecurity, transport difficulties, housing insecurity, and interpersonal safety and there were no concerns identified this admission.     Consults:   Consults (From admission, onward)          Status Ordering Provider     Inpatient consult to Gastroenterology  Once        Provider:  (Not yet assigned)    LUIS Oliver new Assessment & Plan notes have been filed under this hospital service since the last note was generated.  Service: Hospital Medicine    Final Active Diagnoses:    Diagnosis Date Noted POA    PRINCIPAL PROBLEM:  Sickle cell anemia with crisis [D57.00] 02/23/2021 Yes    Diarrhea [R19.7] 11/11/2024 Yes    Abdominal pain [R10.9] 10/28/2024 Yes    Long term (current) use of anticoagulants [Z79.01] 08/16/2024 Not Applicable    Thrombosis of internal carotid, left [I65.22] 08/03/2024 Yes    Hypokalemia [E87.6] 01/10/2020 Yes    Anxiety [F41.9] 03/20/2018 Yes    Obesity (BMI 30-39.9) [E66.9] 04/25/2017  Yes    Hypertension [I10] 04/16/2017 Yes    Iron deficiency anemia [D50.9] 02/21/2017 Yes      Problems Resolved During this Admission:       Discharged Condition: good    Disposition: Home or Self Care    Follow Up:    Patient Instructions:   No discharge procedures on file.    Significant Diagnostic Studies: Labs: CBC   Recent Labs   Lab 11/14/24  0327 11/15/24  0831   WBC 7.52 7.51   HGB 7.6* 8.3*   HCT 22.7* 24.0*    480*       Pending Diagnostic Studies:       Procedure Component Value Units Date/Time    Calprotectin, Stool [[id number]] Collected: 11/14/24 2351    Order Status: Sent Lab Status: In process Updated: 11/15/24 0649    Specimen: Stool     H. pylori antigen, stool [[id number]] Collected: 11/14/24 2351    Order Status: Sent Lab Status: In process Updated: 11/15/24 0648    Specimen: Stool     Pancreatic elastase, fecal [[id number]] Collected: 11/14/24 2351    Order Status: Sent Lab Status: In process Updated: 11/15/24 0649    Specimen: Stool     Specimen to Pathology, Surgery Gastrointestinal tract [[id number]] Collected: 11/13/24 1056    Order Status: Sent Lab Status: In process Updated: 11/13/24 1441    Specimen: Tissue     Stool Exam-Ova,Cysts,Parasites [[id number]] Collected: 11/14/24 2351    Order Status: Sent Lab Status: In process Updated: 11/15/24 0648    Specimen: Stool            Medications:  Reconciled Home Medications:      Medication List        START taking these medications      hydrOXYzine pamoate 25 MG Cap  Commonly known as: VISTARIL  Take 1 capsule (25 mg total) by mouth every 4 (four) hours as needed (Anxiety).     promethazine 12.5 MG Tab  Commonly known as: PHENERGAN  Take 2 tablets (25 mg total) by mouth 4 (four) times daily.            CHANGE how you take these medications      tiZANidine 4 MG tablet  Commonly known as: ZANAFLEX  Take 1 tablet (4 mg total) by mouth every 8 (eight) hours as needed (Muscle pain).  What changed:   when to take this  reasons to take this             CONTINUE taking these medications      acetaminophen 500 MG tablet  Commonly known as: TYLENOL  Take 1,000 mg by mouth every 8 (eight) hours as needed for Pain.     albuterol 90 mcg/actuation inhaler  Commonly known as: VENTOLIN HFA  Inhale 2 puffs into the lungs every 6 (six) hours as needed for Wheezing or Shortness of Breath. Rescue     amLODIPine 10 MG tablet  Commonly known as: NORVASC  Take 1 tablet (10 mg total) by mouth once daily.     enoxaparin 100 mg/mL Syrg  Commonly known as: LOVENOX  Inject 1 mL (100 mg total) into the skin every 12 (twelve) hours.     FLUoxetine 40 MG capsule  Take 1 capsule (40 mg total) by mouth once daily.     fluticasone propionate 50 mcg/actuation nasal spray  Commonly known as: FLONASE  INSTILL 1 SPRAY INTO EACH NOSTRIL EVERY DAY     folic acid 1 MG tablet  Commonly known as: FOLVITE  Take 1 tablet (1 mg total) by mouth once daily.     gabapentin 300 MG capsule  Commonly known as: NEURONTIN  Take 2 capsules (600 mg total) by mouth 3 (three) times daily.     morphine 30 MG 12 hr tablet  Commonly known as: MS CONTIN  Take 1 tablet (30 mg total) by mouth every 12 (twelve) hours. for 21 days     ondansetron 4 MG Tbdl  Commonly known as: ZOFRAN-ODT  Take 1 tablet (4 mg total) by mouth every 8 (eight) hours as needed.     oxyCODONE 15 MG Tab  Commonly known as: ROXICODONE  Take 1 tablet (15 mg total) by mouth every 4 (four) hours as needed for Pain.     polyethylene glycol 17 gram/dose powder  Commonly known as: GLYCOLAX  Use to cap to measure 17g, mix with liquid, and take by mouth once daily.     scopolamine 1.3-1.5 mg (1 mg over 3 days)  Commonly known as: TRANSDERM-SCOP  Place 1 patch onto the skin Every 3 (three) days.     senna-docusate 8.6-50 mg 8.6-50 mg per tablet  Commonly known as: PERICOLACE  Take 1 tablet by mouth daily as needed for Constipation.     VITAMIN C ORAL  Take 1 capsule by mouth once daily.            STOP taking these medications      ALPRAZolam 0.25  MG tablet  Commonly known as: XANAX              Indwelling Lines/Drains at time of discharge:   Lines/Drains/Airways       None                   Time spent on the discharge of patient: 31 minutes         Shlomo Albert MD  Department of Hospital Medicine  [facility]

## 2024-11-17 LAB
BACTERIA BLD CULT: NORMAL
BACTERIA BLD CULT: NORMAL

## 2024-11-18 LAB
COLLECT DURATION TIME STL: 24 H
COMMENT: NORMAL
ELASTASE 1, FECAL: >500 MCG/G
FAT 24H STL-MRATE: <1 G/24 H (ref 2–7)
FINAL PATHOLOGIC DIAGNOSIS: NORMAL
GROSS: NORMAL
Lab: NORMAL
PERCENT FAT: ABNORMAL
SPECIMEN WT STL QN: 22 G

## 2024-11-21 ENCOUNTER — OFFICE VISIT (OUTPATIENT)
Dept: HEMATOLOGY/ONCOLOGY | Facility: CLINIC | Age: 44
End: 2024-11-21
Payer: MEDICARE

## 2024-11-21 ENCOUNTER — LAB VISIT (OUTPATIENT)
Dept: LAB | Facility: HOSPITAL | Age: 44
End: 2024-11-21
Payer: MEDICARE

## 2024-11-21 VITALS
TEMPERATURE: 100 F | HEART RATE: 88 BPM | SYSTOLIC BLOOD PRESSURE: 188 MMHG | BODY MASS INDEX: 40.37 KG/M2 | RESPIRATION RATE: 18 BRPM | OXYGEN SATURATION: 100 % | HEIGHT: 62 IN | DIASTOLIC BLOOD PRESSURE: 101 MMHG | WEIGHT: 219.38 LBS

## 2024-11-21 DIAGNOSIS — D57.00 SICKLE-CELL DISEASE WITH PAIN: Primary | ICD-10-CM

## 2024-11-21 DIAGNOSIS — D57.00 SICKLE CELL PAIN CRISIS: ICD-10-CM

## 2024-11-21 DIAGNOSIS — R10.9 ABDOMINAL PAIN, UNSPECIFIED ABDOMINAL LOCATION: ICD-10-CM

## 2024-11-21 DIAGNOSIS — K21.9 GASTROESOPHAGEAL REFLUX DISEASE, UNSPECIFIED WHETHER ESOPHAGITIS PRESENT: ICD-10-CM

## 2024-11-21 DIAGNOSIS — R50.9 FEVER, UNSPECIFIED FEVER CAUSE: ICD-10-CM

## 2024-11-21 DIAGNOSIS — Z86.711 HISTORY OF PULMONARY EMBOLISM: ICD-10-CM

## 2024-11-21 DIAGNOSIS — D57.00 SICKLE-CELL DISEASE WITH PAIN: ICD-10-CM

## 2024-11-21 DIAGNOSIS — I10 HYPERTENSION, UNSPECIFIED TYPE: ICD-10-CM

## 2024-11-21 PROBLEM — K59.00 CONSTIPATION: Status: RESOLVED | Noted: 2024-08-22 | Resolved: 2024-11-21

## 2024-11-21 PROBLEM — G93.40 ENCEPHALOPATHY: Status: RESOLVED | Noted: 2024-07-02 | Resolved: 2024-11-21

## 2024-11-21 PROBLEM — R78.81 BACTEREMIA: Status: RESOLVED | Noted: 2024-06-28 | Resolved: 2024-11-21

## 2024-11-21 PROBLEM — M62.838 MUSCLE SPASM: Status: RESOLVED | Noted: 2024-10-08 | Resolved: 2024-11-21

## 2024-11-21 PROBLEM — R19.7 DIARRHEA: Status: RESOLVED | Noted: 2024-11-11 | Resolved: 2024-11-21

## 2024-11-21 PROBLEM — E66.01 MORBID OBESITY: Status: RESOLVED | Noted: 2020-06-08 | Resolved: 2024-11-21

## 2024-11-21 PROBLEM — Z75.8 DISCHARGE PLANNING ISSUES: Status: RESOLVED | Noted: 2020-12-22 | Resolved: 2024-11-21

## 2024-11-21 PROBLEM — M25.561 RIGHT KNEE PAIN: Status: RESOLVED | Noted: 2020-12-23 | Resolved: 2024-11-21

## 2024-11-21 PROBLEM — J45.20 INTERMITTENT ASTHMA WITHOUT COMPLICATION: Status: RESOLVED | Noted: 2020-01-10 | Resolved: 2024-11-21
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
## 2024-11-21 PROBLEM — M54.50 MIDLINE LOW BACK PAIN WITHOUT SCIATICA: Status: RESOLVED | Noted: 2024-10-28 | Resolved: 2024-11-21

## 2024-11-21 PROBLEM — T88.8XXA FLUID COLLECTION AT SURGICAL SITE: Status: RESOLVED | Noted: 2024-08-22 | Resolved: 2024-11-21

## 2024-11-21 PROBLEM — J18.9 CAP (COMMUNITY ACQUIRED PNEUMONIA): Status: RESOLVED | Noted: 2020-06-09 | Resolved: 2024-11-21

## 2024-11-21 PROBLEM — E87.6 HYPOKALEMIA: Status: RESOLVED | Noted: 2020-01-10 | Resolved: 2024-11-21

## 2024-11-21 PROBLEM — R06.83 SNORING: Status: RESOLVED | Noted: 2017-10-03 | Resolved: 2024-11-21

## 2024-11-21 PROBLEM — J81.1 PULMONARY EDEMA: Status: RESOLVED | Noted: 2021-02-27 | Resolved: 2024-11-21

## 2024-11-21 PROBLEM — T42.1X1A TEGRETOL TOXICITY: Status: RESOLVED | Noted: 2024-07-03 | Resolved: 2024-11-21

## 2024-11-21 PROBLEM — Z91.89: Status: RESOLVED | Noted: 2024-07-02 | Resolved: 2024-11-21

## 2024-11-21 PROBLEM — J96.01 ACUTE HYPOXEMIC RESPIRATORY FAILURE: Status: RESOLVED | Noted: 2020-06-08 | Resolved: 2024-11-21

## 2024-11-21 PROBLEM — R10.11 RIGHT UPPER QUADRANT ABDOMINAL PAIN: Status: RESOLVED | Noted: 2024-10-28 | Resolved: 2024-11-21

## 2024-11-21 LAB
ALBUMIN SERPL BCP-MCNC: 3.9 G/DL (ref 3.5–5.2)
ALP SERPL-CCNC: 107 U/L (ref 40–150)
ALT SERPL W/O P-5'-P-CCNC: 28 U/L (ref 10–44)
ANION GAP SERPL CALC-SCNC: 10 MMOL/L (ref 8–16)
AST SERPL-CCNC: 33 U/L (ref 10–40)
BASOPHILS # BLD AUTO: 0.06 K/UL (ref 0–0.2)
BASOPHILS NFR BLD: 1 % (ref 0–1.9)
BILIRUB SERPL-MCNC: 0.6 MG/DL (ref 0.1–1)
BUN SERPL-MCNC: 13 MG/DL (ref 6–20)
CALCIUM SERPL-MCNC: 10.5 MG/DL (ref 8.7–10.5)
CHLORIDE SERPL-SCNC: 106 MMOL/L (ref 95–110)
CO2 SERPL-SCNC: 24 MMOL/L (ref 23–29)
CREAT SERPL-MCNC: 1.3 MG/DL (ref 0.5–1.4)
DIFFERENTIAL METHOD BLD: ABNORMAL
EOSINOPHIL # BLD AUTO: 0.3 K/UL (ref 0–0.5)
EOSINOPHIL NFR BLD: 5.2 % (ref 0–8)
ERYTHROCYTE [DISTWIDTH] IN BLOOD BY AUTOMATED COUNT: 19.2 % (ref 11.5–14.5)
EST. GFR  (NO RACE VARIABLE): 51.4 ML/MIN/1.73 M^2
GLUCOSE SERPL-MCNC: 98 MG/DL (ref 70–110)
HCT VFR BLD AUTO: 30.2 % (ref 37–48.5)
HGB BLD-MCNC: 10.1 G/DL (ref 12–16)
IMM GRANULOCYTES # BLD AUTO: 0.01 K/UL (ref 0–0.04)
IMM GRANULOCYTES NFR BLD AUTO: 0.2 % (ref 0–0.5)
LYMPHOCYTES # BLD AUTO: 2 K/UL (ref 1–4.8)
LYMPHOCYTES NFR BLD: 33.9 % (ref 18–48)
MCH RBC QN AUTO: 21.5 PG (ref 27–31)
MCHC RBC AUTO-ENTMCNC: 33.4 G/DL (ref 32–36)
MCV RBC AUTO: 64 FL (ref 82–98)
MONOCYTES # BLD AUTO: 0.6 K/UL (ref 0.3–1)
MONOCYTES NFR BLD: 10.8 % (ref 4–15)
NEUTROPHILS # BLD AUTO: 2.9 K/UL (ref 1.8–7.7)
NEUTROPHILS NFR BLD: 48.9 % (ref 38–73)
NRBC BLD-RTO: 4 /100 WBC
O+P STL MICRO: NORMAL
PLATELET # BLD AUTO: 453 K/UL (ref 150–450)
PMV BLD AUTO: 9.2 FL (ref 9.2–12.9)
POTASSIUM SERPL-SCNC: 3.8 MMOL/L (ref 3.5–5.1)
PROT SERPL-MCNC: 8.6 G/DL (ref 6–8.4)
RBC # BLD AUTO: 4.69 M/UL (ref 4–5.4)
SARS-COV-2 RDRP RESP QL NAA+PROBE: NEGATIVE
SODIUM SERPL-SCNC: 140 MMOL/L (ref 136–145)
WBC # BLD AUTO: 5.93 K/UL (ref 3.9–12.7)

## 2024-11-21 PROCEDURE — 1111F DSCHRG MED/CURRENT MED MERGE: CPT | Mod: CPTII,S$GLB,, | Performed by: INTERNAL MEDICINE

## 2024-11-21 PROCEDURE — 80053 COMPREHEN METABOLIC PANEL: CPT | Performed by: INTERNAL MEDICINE

## 2024-11-21 PROCEDURE — 99215 OFFICE O/P EST HI 40 MIN: CPT | Mod: GC,S$GLB,, | Performed by: INTERNAL MEDICINE

## 2024-11-21 PROCEDURE — 3008F BODY MASS INDEX DOCD: CPT | Mod: CPTII,S$GLB,, | Performed by: INTERNAL MEDICINE

## 2024-11-21 PROCEDURE — 87632 RESP VIRUS 6-11 TARGETS: CPT | Performed by: INTERNAL MEDICINE

## 2024-11-21 PROCEDURE — 87635 SARS-COV-2 COVID-19 AMP PRB: CPT | Performed by: INTERNAL MEDICINE

## 2024-11-21 PROCEDURE — 3077F SYST BP >= 140 MM HG: CPT | Mod: CPTII,S$GLB,, | Performed by: INTERNAL MEDICINE

## 2024-11-21 PROCEDURE — 3080F DIAST BP >= 90 MM HG: CPT | Mod: CPTII,S$GLB,, | Performed by: INTERNAL MEDICINE

## 2024-11-21 PROCEDURE — 36415 COLL VENOUS BLD VENIPUNCTURE: CPT | Performed by: INTERNAL MEDICINE

## 2024-11-21 PROCEDURE — 99999 PR PBB SHADOW E&M-EST. PATIENT-LVL V: CPT | Mod: PBBFAC,,, | Performed by: INTERNAL MEDICINE

## 2024-11-21 PROCEDURE — 1159F MED LIST DOCD IN RCRD: CPT | Mod: CPTII,S$GLB,, | Performed by: INTERNAL MEDICINE

## 2024-11-21 PROCEDURE — 85025 COMPLETE CBC W/AUTO DIFF WBC: CPT | Performed by: INTERNAL MEDICINE

## 2024-11-21 RX ORDER — DICYCLOMINE HYDROCHLORIDE 10 MG/1
10 CAPSULE ORAL 4 TIMES DAILY PRN
Qty: 120 CAPSULE | Refills: 0 | Status: ON HOLD | OUTPATIENT
Start: 2024-11-21 | End: 2024-12-21

## 2024-11-21 RX ORDER — OXYCODONE HYDROCHLORIDE 15 MG/1
15 TABLET ORAL EVERY 4 HOURS PRN
Qty: 60 TABLET | Refills: 0 | Status: ON HOLD | OUTPATIENT
Start: 2024-11-21 | End: 2024-12-01

## 2024-11-21 RX ORDER — MORPHINE SULFATE 30 MG/1
30 TABLET, FILM COATED, EXTENDED RELEASE ORAL EVERY 12 HOURS
Qty: 42 TABLET | Refills: 0 | Status: ON HOLD | OUTPATIENT
Start: 2024-11-21 | End: 2024-12-12

## 2024-11-21 RX ORDER — PANTOPRAZOLE SODIUM 40 MG/1
40 TABLET, DELAYED RELEASE ORAL DAILY
Qty: 90 TABLET | Refills: 3 | Status: ON HOLD | OUTPATIENT
Start: 2024-11-21 | End: 2025-11-21

## 2024-11-21 RX ORDER — NALOXONE HYDROCHLORIDE 4 MG/.1ML
1 SPRAY NASAL ONCE
Qty: 2 EACH | Refills: 0 | Status: SHIPPED | OUTPATIENT
Start: 2024-11-21 | End: 2024-11-22

## 2024-11-21 RX ORDER — HYDROXYUREA 500 MG/1
500 CAPSULE ORAL 2 TIMES DAILY
Qty: 180 CAPSULE | Refills: 3 | Status: CANCELLED | OUTPATIENT
Start: 2024-11-21 | End: 2025-11-21

## 2024-11-21 NOTE — PROGRESS NOTES
Route Chart for Scheduling    BMT Chart Routing      Follow up with physician 3 weeks. 3 months for Sickle cell care, Dr. Clark or Dr. Soni   Follow up with [facility]    Provider visit type Benign hem   Infusion scheduling note    Injection scheduling note    Labs CBC, CMP, ferritin, iron and TIBC and reticulocytes   Scheduling:  Preferred lab:  Lab interval:  CBC, CMP, Retic, ferritin, Iron/TIBC   Imaging    Pharmacy appointment    Other referrals       Additional referrals needed  Primary care, GI, Opthamology

## 2024-11-21 NOTE — PROGRESS NOTES
NEW ONCOLOGY VISIT - ESTABLISH CARE    Subjective:      Patient ID: Nazanin Malone    Chief Complaint: Sickle Cell Beta+ Thalassemia    TRINA Malone is a 44 y.o. female, referred by No ref. provider found, to clinic for evaluation and management of Sickle Cell Beta+ Thalassemia. She has a PMH of Sickle Cell Beta+ Thalassemia, HTN, & Asthma, Trigeminal Neuralgia,& PE and was previously under the care of Dr. Montgomery prior to moving to [place], [place]. She was seen by Dr. Maddie Watts at [facility] until 2024 when she moved back to [place], [place]. During her time in [place] she had a reported episode of Acute Chest Syndrome and was started on routine exchange transfusions. In addition, she had an episode of rhabdomyolysis in  requiring fasciotomy. Since returning to [facility], she has had recurrent admissions for acute pain crisis with abdominal pain, nausea and vomiting with her most recent admission being 24 during which time GI was consulted with EGD performed with pathology negative for malignancy or H. Pylori but showing Duodenitis. She reports continued abdominal pain since discharge with intermittent cramping and decreased oral intake. Of note, she has been off of Hydrea since relocating to [facility] and has been requiring ED care for pain medication refills. This AM, she woke up with cold like symptoms with sneezing and runny nose but denies any sick contacts.       Past medical history: Sickle Cell Beta+ Thalassemia, HTN, & Asthma, Trigeminal Neuralgia,& PE   Past surgical history:    Allergies: As noted above  Social history: Denies tobacco, denies ETOH, & on disability for sickle cell  Hospitalizations: 8 since 2024  Family history: DM, HTN in mother  Medications: As noted below    ECOG Performance status: 1 - Symptomatic but completely ambulatory    Cancer Staging   No matching staging information was found for the patient.      Oncologic History:  Oncology History    No  history exists.       Review of Systems   Constitutional:  Positive for activity change, appetite change and fatigue.   HENT:  Positive for sinus pressure/congestion and sneezing.    Gastrointestinal:  Positive for abdominal pain and nausea.   Neurological:  Positive for weakness.       Allergies:  Review of patient's allergies indicates:   Allergen Reactions    Cat dander Anaphylaxis, Itching and Shortness Of Breath    Nsaids (non-steroidal anti-inflammatory drug) Itching and Anaphylaxis    Latex Rash       Medications:  Current Outpatient Medications   Medication Sig Dispense Refill    acetaminophen (TYLENOL) 500 MG tablet Take 1,000 mg by mouth every 8 (eight) hours as needed for Pain.      albuterol (VENTOLIN HFA) 90 mcg/actuation inhaler Inhale 2 puffs into the lungs every 6 (six) hours as needed for Wheezing or Shortness of Breath. Rescue 18 g 2    amLODIPine (NORVASC) 10 MG tablet Take 1 tablet (10 mg total) by mouth once daily. 90 tablet 3    ascorbic acid (VITAMIN C ORAL) Take 1 capsule by mouth once daily.      enoxaparin (LOVENOX) 100 mg/mL Syrg Inject 1 mL (100 mg total) into the skin every 12 (twelve) hours. 60 mL 2    FLUoxetine 40 MG capsule Take 1 capsule (40 mg total) by mouth once daily. 90 capsule 1    fluticasone propionate (FLONASE) 50 mcg/actuation nasal spray INSTILL 1 SPRAY INTO EACH NOSTRIL EVERY DAY 16 mL 11    folic acid (FOLVITE) 1 MG tablet Take 1 tablet (1 mg total) by mouth once daily. 60 tablet 0    gabapentin (NEURONTIN) 300 MG capsule Take 2 capsules (600 mg total) by mouth 3 (three) times daily. 180 capsule 11    hydrOXYzine pamoate (VISTARIL) 25 MG Cap Take 1 capsule (25 mg total) by mouth every 4 (four) hours as needed (Anxiety). 120 capsule 0    morphine (MS CONTIN) 30 MG 12 hr tablet Take 1 tablet (30 mg total) by mouth every 12 (twelve) hours. for 21 days 42 tablet 0    oxyCODONE (ROXICODONE) 15 MG Tab Take 1 tablet (15 mg total) by mouth every 4 (four) hours as needed for  Pain. 60 tablet 0    polyethylene glycol (GLYCOLAX) 17 gram/dose powder Use to cap to measure 17g, mix with liquid, and take by mouth once daily. 510 g 0    promethazine (PHENERGAN) 12.5 MG Tab Take 2 tablets (25 mg total) by mouth 4 (four) times daily. 56 tablet 0    scopolamine (TRANSDERM-SCOP) 1.3-1.5 mg (1 mg over 3 days) Place 1 patch onto the skin Every 3 (three) days. 10 patch 0    senna-docusate 8.6-50 mg (PERICOLACE) 8.6-50 mg per tablet Take 1 tablet by mouth daily as needed for Constipation.      tiZANidine (ZANAFLEX) 4 MG tablet Take 1 tablet (4 mg total) by mouth every 8 (eight) hours as needed (Muscle pain). 90 tablet 0     No current facility-administered medications for this visit.       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    Asthma     Depression     Hypertension     Morbid obesity     Opioid dependence 2017    Pneumonia due to Streptococcus pneumoniae 2017    Right lower lobe pneumonia 2017    Sepsis due to Streptococcus pneumoniae 2017    Sickle cell-beta thalassemia disease with pain     Trigeminal neuralgia        PSH:  Past Surgical History:   Procedure Laterality Date     SECTION      ESOPHAGOGASTRODUODENOSCOPY N/A 2024    Procedure: EGD (ESOPHAGOGASTRODUODENOSCOPY);  Surgeon: Allen Marshall MD;  Location: [address]);  Service: Gastroenterology;  Laterality: N/A;    TONSILLECTOMY      TUBAL LIGATION         FamHx:  Family History   Problem Relation Name Age of Onset    Heart disease Mother      Diabetes Mother      Heart disease Father      Breast cancer Neg Hx      Ovarian cancer Neg Hx      Colon cancer Neg Hx         SocHx:  Social History     Socioeconomic History    Marital status: Single   Tobacco Use    Smoking status: Never    Smokeless tobacco: Never   Substance and Sexual Activity    Alcohol use: No    Drug use: Yes      "Types: Marijuana     Comment: periodically     Sexual activity: Not Currently     Birth control/protection: See Surgical Hx     Social Drivers of Health     Financial Resource Strain: Low Risk  (11/14/2024)    Overall Financial Resource Strain (CARDIA)     Difficulty of Paying Living Expenses: Not hard at all   Recent Concern: Financial Resource Strain - Medium Risk (10/29/2024)    Overall Financial Resource Strain (CARDIA)     Difficulty of Paying Living Expenses: Somewhat hard   Food Insecurity: No Food Insecurity (11/14/2024)    Hunger Vital Sign     Worried About Running Out of Food in the Last Year: Never true     Ran Out of Food in the Last Year: Never true   Transportation Needs: No Transportation Needs (11/14/2024)    TRANSPORTATION NEEDS     Transportation : No   Physical Activity: Inactive (11/14/2024)    Exercise Vital Sign     Days of Exercise per Week: 0 days     Minutes of Exercise per Session: 0 min   Stress: No Stress Concern Present (11/14/2024)    [employer] - Occupational Stress Questionnaire     Feeling of Stress : Not at all   Recent Concern: Stress - Stress Concern Present (10/29/2024)    [employer] - Occupational Stress Questionnaire     Feeling of Stress : To some extent   Housing Stability: Low Risk  (11/14/2024)    Housing Stability Vital Sign     Unable to Pay for Housing in the Last Year: No     Homeless in the Last Year: No   Recent Concern: Housing Stability - High Risk (10/29/2024)    Housing Stability Vital Sign     Unable to Pay for Housing in the Last Year: Yes     Homeless in the Last Year: Yes       Distress Score    Distress Score: 0 - No Distress        Objective:      Vitals:    11/21/24 0801   BP: (!) 188/101  Comment: meds not taken   BP Location: Left arm   Patient Position: Sitting   Pulse: 88   Resp: 18   Temp: 100.1 °F (37.8 °C)   TempSrc: Oral   SpO2: 100%   Weight: 99.5 kg (219 lb 5.7 oz)   Height: 5' 2" (1.575 " m)     Physical Exam  Vitals reviewed.   Constitutional:       Appearance: Normal appearance.   Cardiovascular:      Rate and Rhythm: Normal rate and regular rhythm.   Abdominal:      Tenderness: There is abdominal tenderness.   Musculoskeletal:         General: Normal range of motion.   Neurological:      General: No focal deficit present.      Mental Status: She is alert and oriented to person, place, and time.   Psychiatric:         Mood and Affect: Mood normal.           LABS:  WBC   Date Value Ref Range Status   11/21/2024 5.93 3.90 - 12.70 K/uL Final     Hemoglobin   Date Value Ref Range Status   11/21/2024 10.1 (L) 12.0 - 16.0 g/dL Final     POC Hematocrit   Date Value Ref Range Status   11/10/2024 31 (L) 36 - 54 %PCV Final     Hematocrit   Date Value Ref Range Status   11/21/2024 30.2 (L) 37.0 - 48.5 % Final     Platelets   Date Value Ref Range Status   11/21/2024 453 (H) 150 - 450 K/uL Final       Chemistry        Component Value Date/Time     11/21/2024 0756    K 3.8 11/21/2024 0756     11/21/2024 0756    CO2 24 11/21/2024 0756    BUN 13 11/21/2024 0756    CREATININE 1.3 11/21/2024 0756    GLU 98 11/21/2024 0756        Component Value Date/Time    CALCIUM 10.5 11/21/2024 0756    ALKPHOS 107 11/21/2024 0756    AST 33 11/21/2024 0756    ALT 28 11/21/2024 0756    BILITOT 0.6 11/21/2024 0756    ESTGFRAFRICA >60.0 03/19/2021 1356    EGFRNONAA >60.0 03/19/2021 1356              Assessment:       1. Fever, unspecified fever cause    2. Sickle-cell disease with pain          Plan:         Sickle Cell Beta+ Thalassemia  Previously followed with Dr. Montgomery prior to moving to [place], [place]  She was seen by Dr. Maddie Watts at [facility] until 6/2024  Developed Acute Chest Syndrome in 2023 and was started on routine exchange transfusions  Lost to follow-up since moving back to [facility] with 9 admissions to ED for pain crisis episodes  Plan:  -Resume Hydrea and titrate to tolerance at next visit if acute GI  complaints are improved  -UA at next visit  -Referral to ophthalmology for sickle cell eye care  -Refill of MS Contin 30mg Q12 and Oxycodone 15mg Q4 prn  -Next TTE due 6/2026, last 6/24 with no evidence of Pulmonary HTN  -Continue Folic Acid  -Consider resuming routine exchange if unable to minimize pain crisis admissions on maximum tolerated dose of hydrea  -Consider addition of Crizanlizumab if unable to minimize pain crisis admissions on maximum tolerated dose of hydrea    HTN  Poorly controlled HTN with no PCP established  Educated regarding importance of BP regimen compliance  Plan:  -Urgent referral to primary care placed    Duodenitis  Noted on EGD from 11/13/24 admission  Plan:  -Protonix 40mg daily prescribed at this visit  -Referral to GI placed  -Bentyl added for symptomatic relief    Chronic Anticoagulation  History of PE noted in 2021 and port thrombus noted in 2024  Plan:  Transition back to Eliquis 5mg BID to improve patient compliance    AVN  History of AVN  Plan:  -Consider Ortho referral if worsening    Iron Status  Not on chelation therapy  Plan:  -Repeat iron panel at next visit           Shiraz Gray D.O.  Hematology/Oncology Fellow, PGY-VI

## 2024-11-25 LAB
ENTEROVIRUS/RHINOVIRUS: NOT DETECTED
HUMAN BOCAVIRUS: NOT DETECTED
HUMAN CORONAVIRUS, COMMON COLD VIRUS: NOT DETECTED
INFLUENZA A - H1N1-09: NOT DETECTED
PARAINFLUENZA: NOT DETECTED
RVP - ADENOVIRUS: NOT DETECTED
RVP - HUMAN METAPNEUMOVIRUS (HMPV): NOT DETECTED
RVP - INFLUENZA A: NOT DETECTED
RVP - INFLUENZA B: NOT DETECTED
RVP - RESPIRATORY SYNCTIAL VIRUS (RSV) A: NOT DETECTED
RVP - RESPIRATORY VIRAL PANEL, SOURCE: NORMAL

## 2024-11-26 DIAGNOSIS — D57.00 SICKLE CELL PAIN CRISIS: ICD-10-CM

## 2024-11-26 RX ORDER — OXYCODONE HYDROCHLORIDE 15 MG/1
15 TABLET ORAL EVERY 4 HOURS PRN
Qty: 60 TABLET | Refills: 0 | Status: CANCELLED | OUTPATIENT
Start: 2024-11-26 | End: 2024-12-06

## 2024-11-28 RX ORDER — OXYCODONE HYDROCHLORIDE 15 MG/1
15 TABLET ORAL EVERY 4 HOURS PRN
Qty: 60 TABLET | Refills: 0 | OUTPATIENT
Start: 2024-11-28 | End: 2024-12-08

## 2024-11-29 ENCOUNTER — HOSPITAL ENCOUNTER (INPATIENT)
Facility: HOSPITAL | Age: 44
LOS: 3 days | Discharge: HOME | DRG: 812 | End: 2024-12-02
Attending: EMERGENCY MEDICINE | Admitting: EMERGENCY MEDICINE
Payer: MEDICARE

## 2024-11-29 DIAGNOSIS — R07.9 CHEST PAIN: ICD-10-CM

## 2024-11-29 DIAGNOSIS — D57.00 SICKLE CELL ANEMIA WITH CRISIS: ICD-10-CM

## 2024-11-29 DIAGNOSIS — D57.00 SICKLE CELL PAIN CRISIS: Primary | ICD-10-CM

## 2024-11-29 LAB
ALBUMIN SERPL BCP-MCNC: 3.5 G/DL (ref 3.5–5.2)
ALP SERPL-CCNC: 86 U/L (ref 40–150)
ALT SERPL W/O P-5'-P-CCNC: 18 U/L (ref 10–44)
ANION GAP SERPL CALC-SCNC: 10 MMOL/L (ref 8–16)
AST SERPL-CCNC: 19 U/L (ref 10–40)
BASOPHILS # BLD AUTO: 0.03 K/UL (ref 0–0.2)
BASOPHILS NFR BLD: 0.4 % (ref 0–1.9)
BILIRUB SERPL-MCNC: 0.8 MG/DL (ref 0.1–1)
BUN SERPL-MCNC: 9 MG/DL (ref 6–20)
CALCIUM SERPL-MCNC: 9.8 MG/DL (ref 8.7–10.5)
CHLORIDE SERPL-SCNC: 105 MMOL/L (ref 95–110)
CO2 SERPL-SCNC: 25 MMOL/L (ref 23–29)
CREAT SERPL-MCNC: 1 MG/DL (ref 0.5–1.4)
DIFFERENTIAL METHOD BLD: ABNORMAL
EOSINOPHIL # BLD AUTO: 0.1 K/UL (ref 0–0.5)
EOSINOPHIL NFR BLD: 0.9 % (ref 0–8)
ERYTHROCYTE [DISTWIDTH] IN BLOOD BY AUTOMATED COUNT: 19.7 % (ref 11.5–14.5)
EST. GFR  (NO RACE VARIABLE): >60 ML/MIN/1.73 M^2
GLUCOSE SERPL-MCNC: 112 MG/DL (ref 70–110)
HCT VFR BLD AUTO: 26.4 % (ref 37–48.5)
HGB BLD-MCNC: 8.7 G/DL (ref 12–16)
IMM GRANULOCYTES # BLD AUTO: 0.04 K/UL (ref 0–0.04)
IMM GRANULOCYTES NFR BLD AUTO: 0.5 % (ref 0–0.5)
LYMPHOCYTES # BLD AUTO: 1.9 K/UL (ref 1–4.8)
LYMPHOCYTES NFR BLD: 23.9 % (ref 18–48)
MCH RBC QN AUTO: 21.2 PG (ref 27–31)
MCHC RBC AUTO-ENTMCNC: 33 G/DL (ref 32–36)
MCV RBC AUTO: 64 FL (ref 82–98)
MONOCYTES # BLD AUTO: 0.5 K/UL (ref 0.3–1)
MONOCYTES NFR BLD: 6 % (ref 4–15)
NEUTROPHILS # BLD AUTO: 5.5 K/UL (ref 1.8–7.7)
NEUTROPHILS NFR BLD: 68.3 % (ref 38–73)
NRBC BLD-RTO: 6 /100 WBC
PLATELET # BLD AUTO: 379 K/UL (ref 150–450)
PMV BLD AUTO: 10 FL (ref 9.2–12.9)
POTASSIUM SERPL-SCNC: 3.6 MMOL/L (ref 3.5–5.1)
PROT SERPL-MCNC: 7.9 G/DL (ref 6–8.4)
RBC # BLD AUTO: 4.11 M/UL (ref 4–5.4)
RETICS/RBC NFR AUTO: 2.1 % (ref 0.5–2.5)
SODIUM SERPL-SCNC: 140 MMOL/L (ref 136–145)
WBC # BLD AUTO: 8.02 K/UL (ref 3.9–12.7)

## 2024-11-29 PROCEDURE — 63600175 PHARM REV CODE 636 W HCPCS: Performed by: EMERGENCY MEDICINE

## 2024-11-29 PROCEDURE — 85025 COMPLETE CBC W/AUTO DIFF WBC: CPT | Performed by: EMERGENCY MEDICINE

## 2024-11-29 PROCEDURE — 96375 TX/PRO/DX INJ NEW DRUG ADDON: CPT

## 2024-11-29 PROCEDURE — 63600175 PHARM REV CODE 636 W HCPCS

## 2024-11-29 PROCEDURE — 99285 EMERGENCY DEPT VISIT HI MDM: CPT | Mod: 25

## 2024-11-29 PROCEDURE — 85045 AUTOMATED RETICULOCYTE COUNT: CPT | Performed by: EMERGENCY MEDICINE

## 2024-11-29 PROCEDURE — 25000003 PHARM REV CODE 250

## 2024-11-29 PROCEDURE — 11000001 HC ACUTE MED/SURG PRIVATE ROOM

## 2024-11-29 PROCEDURE — 80053 COMPREHEN METABOLIC PANEL: CPT | Performed by: EMERGENCY MEDICINE

## 2024-11-29 PROCEDURE — 96376 TX/PRO/DX INJ SAME DRUG ADON: CPT

## 2024-11-29 PROCEDURE — 96365 THER/PROPH/DIAG IV INF INIT: CPT

## 2024-11-29 PROCEDURE — 25000003 PHARM REV CODE 250: Performed by: EMERGENCY MEDICINE

## 2024-11-29 RX ORDER — FLUOXETINE HYDROCHLORIDE 20 MG/1
40 CAPSULE ORAL DAILY
Status: DISCONTINUED | OUTPATIENT
Start: 2024-11-30 | End: 2024-12-02 | Stop reason: HOSPADM

## 2024-11-29 RX ORDER — FLUTICASONE PROPIONATE 50 MCG
1 SPRAY, SUSPENSION (ML) NASAL DAILY
Status: DISCONTINUED | OUTPATIENT
Start: 2024-11-30 | End: 2024-12-02 | Stop reason: HOSPADM

## 2024-11-29 RX ORDER — GABAPENTIN 300 MG/1
600 CAPSULE ORAL 3 TIMES DAILY
Status: DISCONTINUED | OUTPATIENT
Start: 2024-11-29 | End: 2024-12-02 | Stop reason: HOSPADM

## 2024-11-29 RX ORDER — IBUPROFEN 200 MG
24 TABLET ORAL
Status: DISCONTINUED | OUTPATIENT
Start: 2024-11-29 | End: 2024-12-02 | Stop reason: HOSPADM

## 2024-11-29 RX ORDER — HYDROMORPHONE HYDROCHLORIDE 1 MG/ML
1 INJECTION, SOLUTION INTRAMUSCULAR; INTRAVENOUS; SUBCUTANEOUS
Status: COMPLETED | OUTPATIENT
Start: 2024-11-29 | End: 2024-11-29

## 2024-11-29 RX ORDER — ACETAMINOPHEN 500 MG
1000 TABLET ORAL EVERY 8 HOURS PRN
Status: DISCONTINUED | OUTPATIENT
Start: 2024-11-29 | End: 2024-11-30

## 2024-11-29 RX ORDER — AMLODIPINE BESYLATE 10 MG/1
10 TABLET ORAL DAILY
Status: DISCONTINUED | OUTPATIENT
Start: 2024-11-30 | End: 2024-11-29

## 2024-11-29 RX ORDER — NALOXONE HCL 0.4 MG/ML
0.02 VIAL (ML) INJECTION
Status: DISCONTINUED | OUTPATIENT
Start: 2024-11-29 | End: 2024-12-02 | Stop reason: HOSPADM

## 2024-11-29 RX ORDER — GLUCAGON 1 MG
1 KIT INJECTION
Status: DISCONTINUED | OUTPATIENT
Start: 2024-11-29 | End: 2024-12-02 | Stop reason: HOSPADM

## 2024-11-29 RX ORDER — HYDROMORPHONE HYDROCHLORIDE 1 MG/ML
2 INJECTION, SOLUTION INTRAMUSCULAR; INTRAVENOUS; SUBCUTANEOUS
Status: COMPLETED | OUTPATIENT
Start: 2024-11-29 | End: 2024-11-29

## 2024-11-29 RX ORDER — ONDANSETRON HYDROCHLORIDE 2 MG/ML
4 INJECTION, SOLUTION INTRAVENOUS
Status: COMPLETED | OUTPATIENT
Start: 2024-11-29 | End: 2024-11-29

## 2024-11-29 RX ORDER — PANTOPRAZOLE SODIUM 40 MG/1
40 TABLET, DELAYED RELEASE ORAL DAILY
Status: DISCONTINUED | OUTPATIENT
Start: 2024-11-30 | End: 2024-12-02 | Stop reason: HOSPADM

## 2024-11-29 RX ORDER — HYDROXYZINE PAMOATE 25 MG/1
25 CAPSULE ORAL EVERY 4 HOURS PRN
Status: DISCONTINUED | OUTPATIENT
Start: 2024-11-29 | End: 2024-12-02 | Stop reason: HOSPADM

## 2024-11-29 RX ORDER — TIZANIDINE 4 MG/1
4 TABLET ORAL EVERY 8 HOURS PRN
Status: DISCONTINUED | OUTPATIENT
Start: 2024-11-29 | End: 2024-12-02 | Stop reason: HOSPADM

## 2024-11-29 RX ORDER — HYDROMORPHONE HYDROCHLORIDE 1 MG/ML
1 INJECTION, SOLUTION INTRAMUSCULAR; INTRAVENOUS; SUBCUTANEOUS
Status: DISCONTINUED | OUTPATIENT
Start: 2024-11-29 | End: 2024-11-30

## 2024-11-29 RX ORDER — ASCORBIC ACID 250 MG
250 TABLET ORAL DAILY
Status: DISCONTINUED | OUTPATIENT
Start: 2024-11-30 | End: 2024-12-02 | Stop reason: HOSPADM

## 2024-11-29 RX ORDER — PROMETHAZINE HYDROCHLORIDE 25 MG/1
25 TABLET ORAL 4 TIMES DAILY
Status: DISCONTINUED | OUTPATIENT
Start: 2024-11-29 | End: 2024-12-02 | Stop reason: HOSPADM

## 2024-11-29 RX ORDER — IBUPROFEN 200 MG
16 TABLET ORAL
Status: DISCONTINUED | OUTPATIENT
Start: 2024-11-29 | End: 2024-12-02 | Stop reason: HOSPADM

## 2024-11-29 RX ORDER — ENOXAPARIN SODIUM 100 MG/ML
40 INJECTION SUBCUTANEOUS EVERY 24 HOURS
Status: DISCONTINUED | OUTPATIENT
Start: 2024-11-29 | End: 2024-11-29

## 2024-11-29 RX ORDER — ALBUTEROL SULFATE 90 UG/1
2 INHALANT RESPIRATORY (INHALATION) EVERY 6 HOURS PRN
Status: DISCONTINUED | OUTPATIENT
Start: 2024-11-29 | End: 2024-12-02 | Stop reason: HOSPADM

## 2024-11-29 RX ORDER — ACETAMINOPHEN 500 MG
1000 TABLET ORAL 3 TIMES DAILY
Status: DISCONTINUED | OUTPATIENT
Start: 2024-11-29 | End: 2024-12-02 | Stop reason: HOSPADM

## 2024-11-29 RX ORDER — HYDROXYUREA 500 MG/1
500 CAPSULE ORAL DAILY
Status: DISCONTINUED | OUTPATIENT
Start: 2024-11-30 | End: 2024-11-29

## 2024-11-29 RX ORDER — FOLIC ACID 1 MG/1
1 TABLET ORAL DAILY
Status: DISCONTINUED | OUTPATIENT
Start: 2024-11-30 | End: 2024-12-02 | Stop reason: HOSPADM

## 2024-11-29 RX ORDER — AMLODIPINE BESYLATE 10 MG/1
10 TABLET ORAL DAILY
Status: DISCONTINUED | OUTPATIENT
Start: 2024-11-30 | End: 2024-12-02 | Stop reason: HOSPADM

## 2024-11-29 RX ORDER — SODIUM CHLORIDE 0.9 % (FLUSH) 0.9 %
10 SYRINGE (ML) INJECTION EVERY 12 HOURS PRN
Status: DISCONTINUED | OUTPATIENT
Start: 2024-11-29 | End: 2024-12-02 | Stop reason: HOSPADM

## 2024-11-29 RX ORDER — MORPHINE SULFATE 30 MG/1
30 TABLET, FILM COATED, EXTENDED RELEASE ORAL EVERY 12 HOURS
Status: DISCONTINUED | OUTPATIENT
Start: 2024-11-29 | End: 2024-12-02 | Stop reason: HOSPADM

## 2024-11-29 RX ORDER — HYDROXYUREA 500 MG/1
1000 CAPSULE ORAL DAILY
Status: DISCONTINUED | OUTPATIENT
Start: 2024-11-30 | End: 2024-12-02 | Stop reason: HOSPADM

## 2024-11-29 RX ADMIN — PROMETHAZINE HYDROCHLORIDE 25 MG: 25 TABLET ORAL at 08:11

## 2024-11-29 RX ADMIN — ACETAMINOPHEN 1000 MG: 500 TABLET ORAL at 08:11

## 2024-11-29 RX ADMIN — PROMETHAZINE HYDROCHLORIDE 25 MG: 25 TABLET ORAL at 05:11

## 2024-11-29 RX ADMIN — ONDANSETRON 4 MG: 2 INJECTION INTRAMUSCULAR; INTRAVENOUS at 11:11

## 2024-11-29 RX ADMIN — HYDROMORPHONE HYDROCHLORIDE 2 MG: 1 INJECTION, SOLUTION INTRAMUSCULAR; INTRAVENOUS; SUBCUTANEOUS at 12:11

## 2024-11-29 RX ADMIN — APIXABAN 5 MG: 5 TABLET, FILM COATED ORAL at 08:11

## 2024-11-29 RX ADMIN — HYDROMORPHONE HYDROCHLORIDE 1 MG: 1 INJECTION, SOLUTION INTRAMUSCULAR; INTRAVENOUS; SUBCUTANEOUS at 08:11

## 2024-11-29 RX ADMIN — HYDROMORPHONE HYDROCHLORIDE 2 MG: 1 INJECTION, SOLUTION INTRAMUSCULAR; INTRAVENOUS; SUBCUTANEOUS at 05:11

## 2024-11-29 RX ADMIN — GABAPENTIN 600 MG: 300 CAPSULE ORAL at 08:11

## 2024-11-29 RX ADMIN — HYDROMORPHONE HYDROCHLORIDE 1 MG: 1 INJECTION, SOLUTION INTRAMUSCULAR; INTRAVENOUS; SUBCUTANEOUS at 11:11

## 2024-11-29 RX ADMIN — HYDROMORPHONE HYDROCHLORIDE 2 MG: 1 INJECTION, SOLUTION INTRAMUSCULAR; INTRAVENOUS; SUBCUTANEOUS at 02:11

## 2024-11-29 RX ADMIN — PROMETHAZINE HYDROCHLORIDE 12.5 MG: 25 INJECTION INTRAMUSCULAR; INTRAVENOUS at 02:11

## 2024-11-29 RX ADMIN — MORPHINE SULFATE 30 MG: 30 TABLET, FILM COATED, EXTENDED RELEASE ORAL at 10:11

## 2024-11-29 NOTE — HPI
45yo female whose PMH includes of sickle cell, anemia, hypertension, SAMUEL who presents with pain.  Overnight she was started having pain in her left leg from the knee down.  Denies trauma.  She also reports swelling in her right ankle.  The pain is so bad that she was vomiting.  Notes mild shortness of breath secondary to vomiting.  Denies chest pain, fevers, chills, weakness, numbness.  Does report that she has a history of prior clots in his on Eliquis but missed a few doses.  Has been taking her oxycodone at home with no relief.

## 2024-11-29 NOTE — ED PROVIDER NOTES
Encounter Date: 2024       History     Chief Complaint   Patient presents with    Sickle Cell Pain Crisis     Pain in right ankle, pain began yesterday, took pain meds at 2am, no relief      HPI    44 y.o.F with hx of sickle cell, anemia, hypertension, SAMUEL who presents with pain.  She states that overnight she was started having pain in her left leg.  From the knee down.  No trauma.  She also reports swelling in her right ankle.  She reports the pain is so bad that she was vomiting.  She reports mild shortness of breath secondary to vomiting.  No chest pain.  No fevers or chills.  No weakness or numbness.  Does report that she has a history of prior clots in his on Eliquis but missed a few doses.  No hemoptysis or pain with inspiration.  Has been taking her oxycodone at home with no relief.  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    Asthma     Depression     Hypertension     Morbid obesity     Opioid dependence 2017    Pneumonia due to Streptococcus pneumoniae 2017    Right lower lobe pneumonia 2017    Sepsis due to Streptococcus pneumoniae 2017    Sickle cell-beta thalassemia disease with pain     Trigeminal neuralgia      Past Surgical History:   Procedure Laterality Date     SECTION      ESOPHAGOGASTRODUODENOSCOPY N/A 2024    Procedure: EGD (ESOPHAGOGASTRODUODENOSCOPY);  Surgeon: Allen Marshall MD;  Location: [address]);  Service: Gastroenterology;  Laterality: N/A;    TONSILLECTOMY      TUBAL LIGATION       Family History   Problem Relation Name Age of Onset    Heart disease Mother      Diabetes Mother      Heart disease Father      Breast cancer  Neg Hx      Ovarian cancer Neg Hx      Colon cancer Neg Hx       Social History     Tobacco Use    Smoking status: Never    Smokeless tobacco: Never   Substance Use Topics    Alcohol use: No    Drug use: Yes     Types: Marijuana     Comment: periodically      Review of Systems    Physical Exam     Initial Vitals [11/29/24 0942]   BP Pulse Resp Temp SpO2   (!) 179/113 85 18 99.1 °F (37.3 °C) 99 %      MAP       --         Physical Exam    Nursing note and vitals reviewed.  Constitutional: She appears well-developed and well-nourished. No distress.   HENT:   Head: Normocephalic and atraumatic.   Nose: Nose normal.   Eyes: Conjunctivae and EOM are normal. Pupils are equal, round, and reactive to light.   Neck:   Normal range of motion.  Cardiovascular:  Normal rate, regular rhythm and normal heart sounds.           Palpable DP and PT pulses in the right lower extremity   Pulmonary/Chest: Breath sounds normal. No respiratory distress. She has no wheezes. She has no rhonchi. She has no rales.   Abdominal: Abdomen is soft. She exhibits no distension. There is no abdominal tenderness. There is no rebound and no guarding.   Musculoskeletal:      Cervical back: Normal range of motion.      Comments: Mild swelling around the right lower extremity ankle and distal tib/fib.  No bony tenderness or deformity.  No calf tenderness, cords or not.     Neurological: She is oriented to person, place, and time. She has normal strength.   Equal strength and sensation in bilateral lower extremities   Skin: Skin is warm and dry. Capillary refill takes less than 2 seconds. No erythema. No pallor.   Psychiatric: She has a normal mood and affect.         ED Course   Procedures  Labs Reviewed   COMPREHENSIVE METABOLIC PANEL - Abnormal       Result Value    Sodium 140      Potassium 3.6      Chloride 105      CO2 25      Glucose 112 (*)     BUN 9      Creatinine 1.0      Calcium 9.8      Total Protein 7.9      Albumin 3.5      Total Bilirubin  0.8      Alkaline Phosphatase 86      AST 19      ALT 18      eGFR >60.0      Anion Gap 10     CBC W/ AUTO DIFFERENTIAL - Abnormal    WBC 8.02      RBC 4.11      Hemoglobin 8.7 (*)     Hematocrit 26.4 (*)     MCV 64 (*)     MCH 21.2 (*)     MCHC 33.0      RDW 19.7 (*)     Platelets 379      MPV 10.0      Immature Granulocytes 0.5      Gran # (ANC) 5.5      Immature Grans (Abs) 0.04      Lymph # 1.9      Mono # 0.5      Eos # 0.1      Baso # 0.03      nRBC 6 (*)     Gran % 68.3      Lymph % 23.9      Mono % 6.0      Eosinophil % 0.9      Basophil % 0.4      Differential Method Automated     RETICULOCYTES    Retic 2.1            Imaging Results              US Lower Extremity Veins Right (Final result)  Result time 11/29/24 13:24:23      Final result by Reggie Alejo MD (11/29/24 13:24:23)                   Impression:      No evidence of deep venous thrombosis in the right lower extremity.      Electronically signed by: Reggie Alejo MD  Date:    11/29/2024  Time:    13:24               Narrative:    EXAMINATION:  US LOWER EXTREMITY VEINS RIGHT    CLINICAL HISTORY:  swelling, hx of dvt, missed eliquis doses;    TECHNIQUE:  Duplex and color flow Doppler evaluation and graded compression of the right lower extremity veins was performed.    COMPARISON:  None    FINDINGS:  Right thigh veins: The common femoral, femoral, popliteal, upper greater saphenous, and deep femoral veins are patent and free of thrombus. The veins are normally compressible and have normal phasic flow and augmentation response.    Right calf veins: The visualized calf veins are patent.    Contralateral CFV: The contralateral (left) common femoral vein is patent and free of thrombus.    Miscellaneous: None                                       Medications   HYDROmorphone injection 2 mg (has no administration in time range)   ondansetron injection 4 mg (4 mg Intravenous Given 11/29/24 1132)   HYDROmorphone injection 1 mg (1 mg Intravenous  Given 11/29/24 1134)   HYDROmorphone injection 2 mg (2 mg Intravenous Given 11/29/24 1238)   promethazine (PHENERGAN) 12.5 mg in 0.9% NaCl 50 mL IVPB (0 mg Intravenous Stopped 11/29/24 1438)   HYDROmorphone injection 2 mg (2 mg Intravenous Given 11/29/24 1433)     Medical Decision Making  44-year-old female with a history of sickle cell disease who presents with pain.  She was states it is typical of her pain crises however she does have some swelling in her right ankle.  Started overnight and home meds not helping.  No fevers or chills.  She reports very mild shortness of breath with vomiting but otherwise no shortness of breath or chest pain.  Low suspicion for acute chest or PE.  She was neurovascularly intact in her lower extremities.  Likely just pain crisis but we will get an ultrasound to rule out a DVT since she was a few doses of her Eliquis.  We will also get labs and give pain control.  Dispo pending.    Workup benign.  However patient is still in pain and has required multiple doses of IV pain meds.  We will admit for pain management.  Discussed with Hospital Medicine.    Amount and/or Complexity of Data Reviewed  Labs: ordered.    Risk  Prescription drug management.                                      Clinical Impression:  Final diagnoses:  [D57.00] Sickle cell pain crisis (Primary)          ED Disposition Condition    Observation Stable                Nicolle Burgess MD  11/29/24 1611

## 2024-11-29 NOTE — SUBJECTIVE & OBJECTIVE
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    Asthma     Depression     Hypertension     Morbid obesity     Opioid dependence 2017    Pneumonia due to Streptococcus pneumoniae 2017    Right lower lobe pneumonia 2017    Sepsis due to Streptococcus pneumoniae 2017    Sickle cell-beta thalassemia disease with pain     Trigeminal neuralgia        Past Surgical History:   Procedure Laterality Date     SECTION      ESOPHAGOGASTRODUODENOSCOPY N/A 2024    Procedure: EGD (ESOPHAGOGASTRODUODENOSCOPY);  Surgeon: Allen Marshall MD;  Location: [address]);  Service: Gastroenterology;  Laterality: N/A;    TONSILLECTOMY      TUBAL LIGATION         Review of patient's allergies indicates:   Allergen Reactions    Cat dander Anaphylaxis, Itching and Shortness Of Breath    Nsaids (non-steroidal anti-inflammatory drug) Itching and Anaphylaxis    Latex Rash       No current facility-administered medications on file prior to encounter.     Current Outpatient Medications on File Prior to Encounter   Medication Sig    acetaminophen (TYLENOL) 500 MG tablet Take 1,000 mg by mouth every 8 (eight) hours as needed for Pain.    albuterol (VENTOLIN HFA) 90 mcg/actuation inhaler Inhale 2 puffs into the lungs every 6 (six) hours as needed for Wheezing or Shortness of Breath. Rescue    amLODIPine (NORVASC) 10 MG tablet Take 1 tablet (10 mg total) by mouth once daily.    apixaban (ELIQUIS) 5 mg Tab Take 1 tablet (5 mg total) by mouth 2 (two) times daily.    ascorbic acid (VITAMIN C ORAL) Take 1 capsule by mouth once daily.    dicyclomine (BENTYL) 10 MG capsule Take 1 capsule (10 mg total) by mouth 4 (four) times daily as needed (stomach cramps).    FLUoxetine 40 MG capsule Take 1 capsule (40 mg total) by mouth once daily.    fluticasone propionate (FLONASE) 50 mcg/actuation nasal spray INSTILL 1 SPRAY  INTO EACH NOSTRIL EVERY DAY    folic acid (FOLVITE) 1 MG tablet Take 1 tablet (1 mg total) by mouth once daily.    gabapentin (NEURONTIN) 300 MG capsule Take 2 capsules (600 mg total) by mouth 3 (three) times daily.    hydrOXYzine pamoate (VISTARIL) 25 MG Cap Take 1 capsule (25 mg total) by mouth every 4 (four) hours as needed (Anxiety).    morphine (MS CONTIN) 30 MG 12 hr tablet Take 1 tablet (30 mg total) by mouth every 12 (twelve) hours. for 21 days    oxyCODONE (ROXICODONE) 15 MG Tab Take 1 tablet (15 mg total) by mouth every 4 (four) hours as needed for Pain.    pantoprazole (PROTONIX) 40 MG tablet Take 1 tablet (40 mg total) by mouth once daily.    polyethylene glycol (GLYCOLAX) 17 gram/dose powder Use to cap to measure 17g, mix with liquid, and take by mouth once daily.    promethazine (PHENERGAN) 12.5 MG Tab Take 2 tablets (25 mg total) by mouth 4 (four) times daily.    scopolamine (TRANSDERM-SCOP) 1.3-1.5 mg (1 mg over 3 days) Place 1 patch onto the skin Every 3 (three) days.    senna-docusate 8.6-50 mg (PERICOLACE) 8.6-50 mg per tablet Take 1 tablet by mouth daily as needed for Constipation.    tiZANidine (ZANAFLEX) 4 MG tablet Take 1 tablet (4 mg total) by mouth every 8 (eight) hours as needed (Muscle pain).     Family History       Problem Relation (Age of Onset)    Diabetes Mother    Heart disease Mother, Father          Tobacco Use    Smoking status: Never    Smokeless tobacco: Never   Substance and Sexual Activity    Alcohol use: No    Drug use: Yes     Types: Marijuana     Comment: periodically     Sexual activity: Not Currently     Birth control/protection: See Surgical Hx     Review of Systems   Constitutional:  Negative for appetite change, diaphoresis, fatigue and fever.   HENT:  Negative for drooling, rhinorrhea, sinus pressure, sinus pain and trouble swallowing.    Eyes:  Negative for photophobia, pain and redness.   Respiratory:  Negative for cough, chest tightness and shortness of breath.     Cardiovascular:  Negative for chest pain and palpitations.   Gastrointestinal:  Negative for abdominal pain, constipation, diarrhea, nausea and vomiting.   Genitourinary:  Negative for decreased urine volume, difficulty urinating, dysuria, flank pain, hematuria and urgency.   Musculoskeletal:  Positive for arthralgias. Negative for back pain.   Skin:  Negative for color change and rash.   Neurological:  Negative for syncope, facial asymmetry and numbness.   Psychiatric/Behavioral:  Negative for confusion.      Objective:     Vital Signs (Most Recent):  Temp: 99 °F (37.2 °C) (11/29/24 1137)  Pulse: 83 (11/29/24 1137)  Resp: 18 (11/29/24 1433)  BP: (!) 168/98 (11/29/24 1137)  SpO2: 99 % (11/29/24 1137) Vital Signs (24h Range):  Temp:  [99 °F (37.2 °C)-99.1 °F (37.3 °C)] 99 °F (37.2 °C)  Pulse:  [83-85] 83  Resp:  [18] 18  SpO2:  [99 %] 99 %  BP: (168-179)/() 168/98     Weight: 94.3 kg (207 lb 14.3 oz)  Body mass index is 38.02 kg/m².     Physical Exam  Vitals reviewed.   Constitutional:       Appearance: Normal appearance. She is obese. She is not ill-appearing.   HENT:      Head: Normocephalic and atraumatic.      Right Ear: External ear normal.      Left Ear: External ear normal.      Nose: Nose normal. No congestion or rhinorrhea.      Mouth/Throat:      Mouth: Mucous membranes are moist.      Pharynx: Oropharynx is clear.   Eyes:      General:         Right eye: No discharge.         Left eye: No discharge.      Extraocular Movements: Extraocular movements intact.      Conjunctiva/sclera: Conjunctivae normal.      Pupils: Pupils are equal, round, and reactive to light.   Cardiovascular:      Rate and Rhythm: Normal rate and regular rhythm.      Pulses: Normal pulses.      Heart sounds: Normal heart sounds. No murmur heard.  Pulmonary:      Effort: Pulmonary effort is normal. No respiratory distress.      Breath sounds: Normal breath sounds. No wheezing.   Abdominal:      General: Abdomen is flat. Bowel  sounds are normal.      Palpations: Abdomen is soft.      Tenderness: There is no abdominal tenderness.   Musculoskeletal:         General: No swelling. Normal range of motion.      Cervical back: Normal range of motion.   Skin:     General: Skin is warm and dry.      Coloration: Skin is not jaundiced.   Neurological:      General: No focal deficit present.      Mental Status: She is alert and oriented to person, place, and time.      Cranial Nerves: No cranial nerve deficit.   Psychiatric:         Mood and Affect: Mood normal.              CRANIAL NERVES     CN III, IV, VI   Pupils are equal, round, and reactive to light.       Significant Labs: All pertinent labs within the past 24 hours have been reviewed.    Significant Imaging: I have reviewed all pertinent imaging results/findings within the past 24 hours.

## 2024-11-29 NOTE — ED NOTES
Assumed care of the patient. Pt in hospital gown, side rails up X2, bed low and locked, and call light is placed within reach. No family/visitors at bedside at this time. Pt denies any complaints or needs.     Nazanin Malone, a 44 y.o. 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

## 2024-11-29 NOTE — H&P
[facility] - Emergency Dept  [facility]  History & Physical    Patient Name: Nazanin Malone  MRN: [id number]  Patient Class: IP- Inpatient  Admission Date: 2024  Attending Physician: Ricardo Bentley DO   Primary Care Provider: ePe Montgomery MD         Patient information was obtained from patient and ER records.     Subjective:     Principal Problem:<principal problem not specified>    Chief Complaint:   Chief Complaint   Patient presents with    Sickle Cell Pain Crisis     Pain in right ankle, pain began yesterday, took pain meds at 2am, no relief         HPI: 45yo female whose PMH includes of sickle cell, anemia, hypertension, SAMUEL who presents with pain.  Overnight she was started having pain in her left leg from the knee down.  Denies trauma.  She also reports swelling in her right ankle.  The pain is so bad that she was vomiting.  Notes mild shortness of breath secondary to vomiting.  Denies chest pain, fevers, chills, weakness, numbness.  Does report that she has a history of prior clots in his on Eliquis but missed a few doses.  Has been taking her oxycodone at home with no relief.    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    Asthma     Depression     Hypertension     Morbid obesity     Opioid dependence 2017    Pneumonia due to Streptococcus pneumoniae 2017    Right lower lobe pneumonia 2017    Sepsis due to Streptococcus pneumoniae 2017    Sickle cell-beta thalassemia disease with pain     Trigeminal neuralgia        Past Surgical History:   Procedure Laterality Date     SECTION      ESOPHAGOGASTRODUODENOSCOPY N/A 2024    Procedure: EGD (ESOPHAGOGASTRODUODENOSCOPY);  Surgeon: Allen Marshall MD;  Location: [address]);  Service: Gastroenterology;  Laterality: N/A;    TONSILLECTOMY      TUBAL LIGATION         Review of patient's  allergies indicates:   Allergen Reactions    Cat dander Anaphylaxis, Itching and Shortness Of Breath    Nsaids (non-steroidal anti-inflammatory drug) Itching and Anaphylaxis    Latex Rash       No current facility-administered medications on file prior to encounter.     Current Outpatient Medications on File Prior to Encounter   Medication Sig    acetaminophen (TYLENOL) 500 MG tablet Take 1,000 mg by mouth every 8 (eight) hours as needed for Pain.    albuterol (VENTOLIN HFA) 90 mcg/actuation inhaler Inhale 2 puffs into the lungs every 6 (six) hours as needed for Wheezing or Shortness of Breath. Rescue    amLODIPine (NORVASC) 10 MG tablet Take 1 tablet (10 mg total) by mouth once daily.    apixaban (ELIQUIS) 5 mg Tab Take 1 tablet (5 mg total) by mouth 2 (two) times daily.    ascorbic acid (VITAMIN C ORAL) Take 1 capsule by mouth once daily.    dicyclomine (BENTYL) 10 MG capsule Take 1 capsule (10 mg total) by mouth 4 (four) times daily as needed (stomach cramps).    FLUoxetine 40 MG capsule Take 1 capsule (40 mg total) by mouth once daily.    fluticasone propionate (FLONASE) 50 mcg/actuation nasal spray INSTILL 1 SPRAY INTO EACH NOSTRIL EVERY DAY    folic acid (FOLVITE) 1 MG tablet Take 1 tablet (1 mg total) by mouth once daily.    gabapentin (NEURONTIN) 300 MG capsule Take 2 capsules (600 mg total) by mouth 3 (three) times daily.    hydrOXYzine pamoate (VISTARIL) 25 MG Cap Take 1 capsule (25 mg total) by mouth every 4 (four) hours as needed (Anxiety).    morphine (MS CONTIN) 30 MG 12 hr tablet Take 1 tablet (30 mg total) by mouth every 12 (twelve) hours. for 21 days    oxyCODONE (ROXICODONE) 15 MG Tab Take 1 tablet (15 mg total) by mouth every 4 (four) hours as needed for Pain.    pantoprazole (PROTONIX) 40 MG tablet Take 1 tablet (40 mg total) by mouth once daily.    polyethylene glycol (GLYCOLAX) 17 gram/dose powder Use to cap to measure 17g, mix with liquid, and take by mouth once daily.    promethazine  (PHENERGAN) 12.5 MG Tab Take 2 tablets (25 mg total) by mouth 4 (four) times daily.    scopolamine (TRANSDERM-SCOP) 1.3-1.5 mg (1 mg over 3 days) Place 1 patch onto the skin Every 3 (three) days.    senna-docusate 8.6-50 mg (PERICOLACE) 8.6-50 mg per tablet Take 1 tablet by mouth daily as needed for Constipation.    tiZANidine (ZANAFLEX) 4 MG tablet Take 1 tablet (4 mg total) by mouth every 8 (eight) hours as needed (Muscle pain).     Family History       Problem Relation (Age of Onset)    Diabetes Mother    Heart disease Mother, Father          Tobacco Use    Smoking status: Never    Smokeless tobacco: Never   Substance and Sexual Activity    Alcohol use: No    Drug use: Yes     Types: Marijuana     Comment: periodically     Sexual activity: Not Currently     Birth control/protection: See Surgical Hx     Review of Systems   Constitutional:  Negative for appetite change, diaphoresis, fatigue and fever.   HENT:  Negative for drooling, rhinorrhea, sinus pressure, sinus pain and trouble swallowing.    Eyes:  Negative for photophobia, pain and redness.   Respiratory:  Negative for cough, chest tightness and shortness of breath.    Cardiovascular:  Negative for chest pain and palpitations.   Gastrointestinal:  Negative for abdominal pain, constipation, diarrhea, nausea and vomiting.   Genitourinary:  Negative for decreased urine volume, difficulty urinating, dysuria, flank pain, hematuria and urgency.   Musculoskeletal:  Positive for arthralgias. Negative for back pain.   Skin:  Negative for color change and rash.   Neurological:  Negative for syncope, facial asymmetry and numbness.   Psychiatric/Behavioral:  Negative for confusion.      Objective:     Vital Signs (Most Recent):  Temp: 99 °F (37.2 °C) (11/29/24 1137)  Pulse: 83 (11/29/24 1137)  Resp: 18 (11/29/24 1433)  BP: (!) 168/98 (11/29/24 1137)  SpO2: 99 % (11/29/24 1137) Vital Signs (24h Range):  Temp:  [99 °F (37.2 °C)-99.1 °F (37.3 °C)] 99 °F (37.2 °C)  Pulse:   [83-85] 83  Resp:  [18] 18  SpO2:  [99 %] 99 %  BP: (168-179)/() 168/98     Weight: 94.3 kg (207 lb 14.3 oz)  Body mass index is 38.02 kg/m².     Physical Exam  Vitals reviewed.   Constitutional:       Appearance: Normal appearance. She is obese. She is not ill-appearing.   HENT:      Head: Normocephalic and atraumatic.      Right Ear: External ear normal.      Left Ear: External ear normal.      Nose: Nose normal. No congestion or rhinorrhea.      Mouth/Throat:      Mouth: Mucous membranes are moist.      Pharynx: Oropharynx is clear.   Eyes:      General:         Right eye: No discharge.         Left eye: No discharge.      Extraocular Movements: Extraocular movements intact.      Conjunctiva/sclera: Conjunctivae normal.      Pupils: Pupils are equal, round, and reactive to light.   Cardiovascular:      Rate and Rhythm: Normal rate and regular rhythm.      Pulses: Normal pulses.      Heart sounds: Normal heart sounds. No murmur heard.  Pulmonary:      Effort: Pulmonary effort is normal. No respiratory distress.      Breath sounds: Normal breath sounds. No wheezing.   Abdominal:      General: Abdomen is flat. Bowel sounds are normal.      Palpations: Abdomen is soft.      Tenderness: There is no abdominal tenderness.   Musculoskeletal:         General: No swelling. Normal range of motion.      Cervical back: Normal range of motion.   Skin:     General: Skin is warm and dry.      Coloration: Skin is not jaundiced.   Neurological:      General: No focal deficit present.      Mental Status: She is alert and oriented to person, place, and time.      Cranial Nerves: No cranial nerve deficit.   Psychiatric:         Mood and Affect: Mood normal.              CRANIAL NERVES     CN III, IV, VI   Pupils are equal, round, and reactive to light.       Significant Labs: All pertinent labs within the past 24 hours have been reviewed.    Significant Imaging: I have reviewed all pertinent imaging results/findings within the  past 24 hours.  Assessment/Plan:     * Vaso-occlusive pain due to sickle cell disease  Pain control  Wean IV pain medications  Close monitoring of Hgb and hemodynamics - no indication for transfusion at this time  Oxygen supplementation to reduce sickling  Hydroxyurea daily  This patient is well known to this service  Consistent reassurance    Drug-seeking behavior  Consistent reassurance    Long term (current) use of anticoagulants  Cont eliquis    History of pulmonary embolism  Cont eliquis      Folate deficiency  Resume replacement      Sickle cell anemia  See plan for crisis      Iron deficiency anemia due to chronic blood loss  See plan for sickle crisis    Anxiety  Resume home regimen      Obesity (BMI 30-39.9)  Body mass index is 38.02 kg/m².  Would benefit from dietary and lifestyle modifications in relation to health  Consider dietary/nutrition consult outpatient    Hypertension  Resume home regimen  Adjust as indicated      VTE Risk Mitigation (From admission, onward)           Ordered     apixaban tablet 5 mg  2 times daily         11/29/24 1631     IP VTE HIGH RISK PATIENT  Once         11/29/24 1619     Place sequential compression device  Until discontinued         11/29/24 1619                                    Ricardo Bentley DO  Department of Hospital Medicine  [facility] - Emergency Dept

## 2024-11-29 NOTE — ASSESSMENT & PLAN NOTE
Body mass index is 38.02 kg/m².  Would benefit from dietary and lifestyle modifications in relation to health  Consider dietary/nutrition consult outpatient

## 2024-11-29 NOTE — ED TRIAGE NOTES
Patient arrived with chief complaint of a sickle cell pain crisis. The patient stated that the pain started at 2 am this morning, patient is also complaining of nausea.   
supportive spouse

## 2024-11-29 NOTE — ASSESSMENT & PLAN NOTE
Pain control  Wean IV pain medications  Close monitoring of Hgb and hemodynamics - no indication for transfusion at this time  Oxygen supplementation to reduce sickling  Hydroxyurea daily  This patient is well known to this service  Consistent reassurance

## 2024-11-30 LAB
ALBUMIN SERPL BCP-MCNC: 3.2 G/DL (ref 3.5–5.2)
ANION GAP SERPL CALC-SCNC: 9 MMOL/L (ref 8–16)
BUN SERPL-MCNC: 9 MG/DL (ref 6–20)
CALCIUM SERPL-MCNC: 9.9 MG/DL (ref 8.7–10.5)
CHLORIDE SERPL-SCNC: 105 MMOL/L (ref 95–110)
CO2 SERPL-SCNC: 25 MMOL/L (ref 23–29)
CREAT SERPL-MCNC: 1 MG/DL (ref 0.5–1.4)
ERYTHROCYTE [DISTWIDTH] IN BLOOD BY AUTOMATED COUNT: 19.8 % (ref 11.5–14.5)
EST. GFR  (NO RACE VARIABLE): >60 ML/MIN/1.73 M^2
GLUCOSE SERPL-MCNC: 76 MG/DL (ref 70–110)
HCT VFR BLD AUTO: 26.7 % (ref 37–48.5)
HGB BLD-MCNC: 9 G/DL (ref 12–16)
MAGNESIUM SERPL-MCNC: 1.7 MG/DL (ref 1.6–2.6)
MCH RBC QN AUTO: 21.4 PG (ref 27–31)
MCHC RBC AUTO-ENTMCNC: 33.7 G/DL (ref 32–36)
MCV RBC AUTO: 63 FL (ref 82–98)
PHOSPHATE SERPL-MCNC: 3.6 MG/DL (ref 2.7–4.5)
PLATELET # BLD AUTO: 373 K/UL (ref 150–450)
PMV BLD AUTO: 9.9 FL (ref 9.2–12.9)
POTASSIUM SERPL-SCNC: 3.6 MMOL/L (ref 3.5–5.1)
RBC # BLD AUTO: 4.21 M/UL (ref 4–5.4)
SODIUM SERPL-SCNC: 139 MMOL/L (ref 136–145)
WBC # BLD AUTO: 7.02 K/UL (ref 3.9–12.7)

## 2024-11-30 PROCEDURE — 80069 RENAL FUNCTION PANEL: CPT

## 2024-11-30 PROCEDURE — 25000003 PHARM REV CODE 250

## 2024-11-30 PROCEDURE — 63600175 PHARM REV CODE 636 W HCPCS

## 2024-11-30 PROCEDURE — 36415 COLL VENOUS BLD VENIPUNCTURE: CPT

## 2024-11-30 PROCEDURE — 25000242 PHARM REV CODE 250 ALT 637 W/ HCPCS

## 2024-11-30 PROCEDURE — 85027 COMPLETE CBC AUTOMATED: CPT

## 2024-11-30 PROCEDURE — 11000001 HC ACUTE MED/SURG PRIVATE ROOM

## 2024-11-30 PROCEDURE — 83735 ASSAY OF MAGNESIUM: CPT

## 2024-11-30 RX ORDER — DIPHENHYDRAMINE HCL 25 MG
25 CAPSULE ORAL EVERY 6 HOURS PRN
Status: DISCONTINUED | OUTPATIENT
Start: 2024-11-30 | End: 2024-12-02 | Stop reason: HOSPADM

## 2024-11-30 RX ORDER — MUPIROCIN 20 MG/G
OINTMENT TOPICAL 2 TIMES DAILY
Status: DISCONTINUED | OUTPATIENT
Start: 2024-11-30 | End: 2024-12-02 | Stop reason: HOSPADM

## 2024-11-30 RX ORDER — HYDROMORPHONE HYDROCHLORIDE 1 MG/ML
2 INJECTION, SOLUTION INTRAMUSCULAR; INTRAVENOUS; SUBCUTANEOUS
Status: DISCONTINUED | OUTPATIENT
Start: 2024-11-30 | End: 2024-12-02 | Stop reason: HOSPADM

## 2024-11-30 RX ADMIN — FLUTICASONE PROPIONATE 50 MCG: 50 SPRAY, METERED NASAL at 09:11

## 2024-11-30 RX ADMIN — PROMETHAZINE HYDROCHLORIDE 25 MG: 25 TABLET ORAL at 08:11

## 2024-11-30 RX ADMIN — GABAPENTIN 600 MG: 300 CAPSULE ORAL at 03:11

## 2024-11-30 RX ADMIN — APIXABAN 5 MG: 5 TABLET, FILM COATED ORAL at 08:11

## 2024-11-30 RX ADMIN — DIPHENHYDRAMINE HYDROCHLORIDE 25 MG: 25 CAPSULE ORAL at 06:11

## 2024-11-30 RX ADMIN — ACETAMINOPHEN 1000 MG: 500 TABLET ORAL at 08:11

## 2024-11-30 RX ADMIN — PROMETHAZINE HYDROCHLORIDE 25 MG: 25 TABLET ORAL at 01:11

## 2024-11-30 RX ADMIN — HYDROMORPHONE HYDROCHLORIDE 2 MG: 1 INJECTION, SOLUTION INTRAMUSCULAR; INTRAVENOUS; SUBCUTANEOUS at 04:11

## 2024-11-30 RX ADMIN — OXYCODONE HYDROCHLORIDE 15 MG: 10 TABLET ORAL at 07:11

## 2024-11-30 RX ADMIN — AMLODIPINE BESYLATE 10 MG: 10 TABLET ORAL at 08:11

## 2024-11-30 RX ADMIN — MORPHINE SULFATE 30 MG: 30 TABLET, FILM COATED, EXTENDED RELEASE ORAL at 08:11

## 2024-11-30 RX ADMIN — GABAPENTIN 600 MG: 300 CAPSULE ORAL at 08:11

## 2024-11-30 RX ADMIN — PANTOPRAZOLE SODIUM 40 MG: 40 TABLET, DELAYED RELEASE ORAL at 08:11

## 2024-11-30 RX ADMIN — HYDROMORPHONE HYDROCHLORIDE 2 MG: 1 INJECTION, SOLUTION INTRAMUSCULAR; INTRAVENOUS; SUBCUTANEOUS at 09:11

## 2024-11-30 RX ADMIN — OXYCODONE HYDROCHLORIDE 15 MG: 10 TABLET ORAL at 03:11

## 2024-11-30 RX ADMIN — ACETAMINOPHEN 1000 MG: 500 TABLET ORAL at 06:11

## 2024-11-30 RX ADMIN — OXYCODONE 15 MG: 5 TABLET ORAL at 01:11

## 2024-11-30 RX ADMIN — FLUOXETINE HYDROCHLORIDE 40 MG: 20 CAPSULE ORAL at 08:11

## 2024-11-30 RX ADMIN — PROMETHAZINE HYDROCHLORIDE 25 MG: 25 TABLET ORAL at 04:11

## 2024-11-30 RX ADMIN — HYDROXYUREA 1000 MG: 500 CAPSULE ORAL at 08:11

## 2024-11-30 RX ADMIN — HYDROMORPHONE HYDROCHLORIDE 2 MG: 1 INJECTION, SOLUTION INTRAMUSCULAR; INTRAVENOUS; SUBCUTANEOUS at 01:11

## 2024-11-30 RX ADMIN — Medication 250 MG: at 08:11

## 2024-11-30 RX ADMIN — OXYCODONE HYDROCHLORIDE 15 MG: 10 TABLET ORAL at 11:11

## 2024-11-30 RX ADMIN — FOLIC ACID 1 MG: 1 TABLET ORAL at 08:11

## 2024-11-30 RX ADMIN — HYDROMORPHONE HYDROCHLORIDE 1 MG: 1 INJECTION, SOLUTION INTRAMUSCULAR; INTRAVENOUS; SUBCUTANEOUS at 06:11

## 2024-11-30 NOTE — ASSESSMENT & PLAN NOTE
Pain control  Wean IV pain medications  Close monitoring of Hgb and hemodynamics - no indication for transfusion at this time  Oxygen supplementation to reduce sickling  Hydroxyurea daily  This patient is well known to this service  Needs to follow up with hematology

## 2024-11-30 NOTE — HOSPITAL COURSE
Admitted for sickle pain crisis.  No indication for transfusion or exchange transfusion.  Pain primarily in RLE; DVT study negative.  No trauma to RLE; plain film negative for fracture of the ankle which is primarily where her pain was.  Pain resolved.  She is to call her hematologist and get follow up appt.  Pt deemed appropriate for discharge; seen and examined prior to departure. Plan discussed with pt, who was agreeable and amenable; medications were discussed and reviewed, outpatient follow-up scheduled, ER precautions were given, all questions were answered to the pt's satisfaction, and was subsequently discharged.

## 2024-11-30 NOTE — PROGRESS NOTES
[facility]  Progress Note    Patient Name: Nazanin Malone  MRN: [id number]  Patient Class: IP- Inpatient   Admission Date: 11/29/2024  Length of Stay: 1 days  Attending Physician: Ricardo Bentley DO  Primary Care Provider: Pee Montgomery MD        Subjective:     Principal Problem:Vaso-occlusive pain due to sickle cell disease        HPI:  45yo female whose PMH includes of sickle cell, anemia, hypertension, SAMUEL who presents with pain.  Overnight she was started having pain in her left leg from the knee down.  Denies trauma.  She also reports swelling in her right ankle.  The pain is so bad that she was vomiting.  Notes mild shortness of breath secondary to vomiting.  Denies chest pain, fevers, chills, weakness, numbness.  Does report that she has a history of prior clots in his on Eliquis but missed a few doses.  Has been taking her oxycodone at home with no relief.    Overview/Hospital Course:  Admitted for sickle pain crisis.  No indication for transfusion or exchange transfusion.  Pain primarily in RLE; DVT study negative.  No trauma to RLE.    Interval History:  Seen and evaluated on general medical floor  No acute events overnight    Clinical status stable, unchanged  No new complaints  Still having RLE pain and generalized pain  She is resting very comfortably in bed during my assessment    Objective:     Vital Signs (Most Recent):  Temp: 98.3 °F (36.8 °C) (11/30/24 1142)  Pulse: 87 (11/30/24 1142)  Resp: 18 (11/30/24 1142)  BP: 131/83 (11/30/24 1142)  SpO2: 95 % (11/30/24 1142) Vital Signs (24h Range):  Temp:  [98 °F (36.7 °C)-98.8 °F (37.1 °C)] 98.3 °F (36.8 °C)  Pulse:  [72-87] 87  Resp:  [17-20] 18  SpO2:  [95 %-99 %] 95 %  BP: (131-178)/() 131/83     Weight: 94.3 kg (207 lb 14.3 oz)  Body mass index is 38.02 kg/m².  No intake or output data in the 24 hours ending 11/30/24 1143      Physical Exam  Vitals reviewed.   Constitutional:       Appearance: Normal  appearance. She is obese. She is not ill-appearing.   HENT:      Head: Normocephalic and atraumatic.      Right Ear: External ear normal.      Left Ear: External ear normal.      Nose: Nose normal. No congestion or rhinorrhea.      Mouth/Throat:      Mouth: Mucous membranes are moist.      Pharynx: Oropharynx is clear.   Eyes:      General:         Right eye: No discharge.         Left eye: No discharge.      Extraocular Movements: Extraocular movements intact.      Conjunctiva/sclera: Conjunctivae normal.      Pupils: Pupils are equal, round, and reactive to light.   Cardiovascular:      Rate and Rhythm: Normal rate and regular rhythm.      Pulses: Normal pulses.      Heart sounds: Normal heart sounds. No murmur heard.  Pulmonary:      Effort: Pulmonary effort is normal. No respiratory distress.      Breath sounds: Normal breath sounds. No wheezing.   Abdominal:      General: Abdomen is flat. Bowel sounds are normal.      Palpations: Abdomen is soft.      Tenderness: There is no abdominal tenderness.   Musculoskeletal:         General: No swelling. Normal range of motion.      Cervical back: Normal range of motion.   Skin:     General: Skin is warm and dry.      Coloration: Skin is not jaundiced.   Neurological:      General: No focal deficit present.      Mental Status: She is alert and oriented to person, place, and time.      Cranial Nerves: No cranial nerve deficit.   Psychiatric:         Mood and Affect: Mood normal.             Significant Labs: All pertinent labs within the past 24 hours have been reviewed.    Significant Imaging: I have reviewed all pertinent imaging results/findings within the past 24 hours.    Assessment/Plan:      * Vaso-occlusive pain due to sickle cell disease  Pain control  Wean IV pain medications  Close monitoring of Hgb and hemodynamics - no indication for transfusion at this time  Oxygen supplementation to reduce sickling  Hydroxyurea daily  This patient is well known to this  service  Needs to follow up with hematology    Drug-seeking behavior  Consistent reassurance    Long term (current) use of anticoagulants  Cont eliquis    History of pulmonary embolism  Cont eliquis      Folate deficiency  Resume replacement      Sickle cell anemia  See plan for crisis      Iron deficiency anemia due to chronic blood loss  See plan for sickle crisis    Anxiety  Resume home regimen      Obesity (BMI 30-39.9)  Body mass index is 38.02 kg/m².  Would benefit from dietary and lifestyle modifications in relation to health  Consider dietary/nutrition consult outpatient    Hypertension  Resume home regimen  Adjust as indicated      VTE Risk Mitigation (From admission, onward)           Ordered     apixaban tablet 5 mg  2 times daily         11/29/24 1631     IP VTE HIGH RISK PATIENT  Once         11/29/24 1619     Place sequential compression device  Until discontinued         11/29/24 1619                    Discharge Planning   ALYSE:      Code Status: Full Code   Is the patient medically ready for discharge?:     Reason for patient still in hospital (select all that apply): Patient trending condition                     Ricardo Bentley DO  Department of Hospital Medicine   [facility]

## 2024-11-30 NOTE — NURSING
Spoke with MD Bobby regarding suggestions / Instructions for upcoming lab work for morning , no new orders received at this time . MD placed regular diet order for patient . Suggested to follow morning team's instructions regarding lab work as they may direct tomorrow .

## 2024-11-30 NOTE — SUBJECTIVE & OBJECTIVE
Interval History:  Seen and evaluated on general medical floor  No acute events overnight    Clinical status stable, unchanged  No new complaints  Still having RLE pain and generalized pain  She is resting very comfortably in bed during my assessment    Objective:     Vital Signs (Most Recent):  Temp: 98.3 °F (36.8 °C) (11/30/24 1142)  Pulse: 87 (11/30/24 1142)  Resp: 18 (11/30/24 1142)  BP: 131/83 (11/30/24 1142)  SpO2: 95 % (11/30/24 1142) Vital Signs (24h Range):  Temp:  [98 °F (36.7 °C)-98.8 °F (37.1 °C)] 98.3 °F (36.8 °C)  Pulse:  [72-87] 87  Resp:  [17-20] 18  SpO2:  [95 %-99 %] 95 %  BP: (131-178)/() 131/83     Weight: 94.3 kg (207 lb 14.3 oz)  Body mass index is 38.02 kg/m².  No intake or output data in the 24 hours ending 11/30/24 1143      Physical Exam  Vitals reviewed.   Constitutional:       Appearance: Normal appearance. She is obese. She is not ill-appearing.   HENT:      Head: Normocephalic and atraumatic.      Right Ear: External ear normal.      Left Ear: External ear normal.      Nose: Nose normal. No congestion or rhinorrhea.      Mouth/Throat:      Mouth: Mucous membranes are moist.      Pharynx: Oropharynx is clear.   Eyes:      General:         Right eye: No discharge.         Left eye: No discharge.      Extraocular Movements: Extraocular movements intact.      Conjunctiva/sclera: Conjunctivae normal.      Pupils: Pupils are equal, round, and reactive to light.   Cardiovascular:      Rate and Rhythm: Normal rate and regular rhythm.      Pulses: Normal pulses.      Heart sounds: Normal heart sounds. No murmur heard.  Pulmonary:      Effort: Pulmonary effort is normal. No respiratory distress.      Breath sounds: Normal breath sounds. No wheezing.   Abdominal:      General: Abdomen is flat. Bowel sounds are normal.      Palpations: Abdomen is soft.      Tenderness: There is no abdominal tenderness.   Musculoskeletal:         General: No swelling. Normal range of motion.      Cervical  back: Normal range of motion.   Skin:     General: Skin is warm and dry.      Coloration: Skin is not jaundiced.   Neurological:      General: No focal deficit present.      Mental Status: She is alert and oriented to person, place, and time.      Cranial Nerves: No cranial nerve deficit.   Psychiatric:         Mood and Affect: Mood normal.             Significant Labs: All pertinent labs within the past 24 hours have been reviewed.    Significant Imaging: I have reviewed all pertinent imaging results/findings within the past 24 hours.

## 2024-12-01 LAB
ALBUMIN SERPL BCP-MCNC: 3 G/DL (ref 3.5–5.2)
ANION GAP SERPL CALC-SCNC: 5 MMOL/L (ref 8–16)
BUN SERPL-MCNC: 15 MG/DL (ref 6–20)
CALCIUM SERPL-MCNC: 9.3 MG/DL (ref 8.7–10.5)
CHLORIDE SERPL-SCNC: 105 MMOL/L (ref 95–110)
CO2 SERPL-SCNC: 28 MMOL/L (ref 23–29)
CREAT SERPL-MCNC: 1.2 MG/DL (ref 0.5–1.4)
ERYTHROCYTE [DISTWIDTH] IN BLOOD BY AUTOMATED COUNT: 19.1 % (ref 11.5–14.5)
EST. GFR  (NO RACE VARIABLE): 56.5 ML/MIN/1.73 M^2
GLUCOSE SERPL-MCNC: 99 MG/DL (ref 70–110)
HCT VFR BLD AUTO: 26.2 % (ref 37–48.5)
HGB BLD-MCNC: 8.6 G/DL (ref 12–16)
MAGNESIUM SERPL-MCNC: 1.8 MG/DL (ref 1.6–2.6)
MCH RBC QN AUTO: 21 PG (ref 27–31)
MCHC RBC AUTO-ENTMCNC: 32.8 G/DL (ref 32–36)
MCV RBC AUTO: 64 FL (ref 82–98)
PHOSPHATE SERPL-MCNC: 4.3 MG/DL (ref 2.7–4.5)
PLATELET # BLD AUTO: 344 K/UL (ref 150–450)
PMV BLD AUTO: 9.5 FL (ref 9.2–12.9)
POTASSIUM SERPL-SCNC: 3.6 MMOL/L (ref 3.5–5.1)
RBC # BLD AUTO: 4.09 M/UL (ref 4–5.4)
SODIUM SERPL-SCNC: 138 MMOL/L (ref 136–145)
WBC # BLD AUTO: 6.88 K/UL (ref 3.9–12.7)

## 2024-12-01 PROCEDURE — 83735 ASSAY OF MAGNESIUM: CPT

## 2024-12-01 PROCEDURE — 36415 COLL VENOUS BLD VENIPUNCTURE: CPT

## 2024-12-01 PROCEDURE — 11000001 HC ACUTE MED/SURG PRIVATE ROOM

## 2024-12-01 PROCEDURE — 80069 RENAL FUNCTION PANEL: CPT

## 2024-12-01 PROCEDURE — 25000003 PHARM REV CODE 250

## 2024-12-01 PROCEDURE — 85027 COMPLETE CBC AUTOMATED: CPT

## 2024-12-01 PROCEDURE — 63600175 PHARM REV CODE 636 W HCPCS

## 2024-12-01 RX ORDER — HYDROXYUREA 500 MG/1
1000 CAPSULE ORAL DAILY
Qty: 60 CAPSULE | Refills: 0 | Status: ON HOLD | OUTPATIENT
Start: 2024-12-02

## 2024-12-01 RX ADMIN — Medication 250 MG: at 08:12

## 2024-12-01 RX ADMIN — OXYCODONE HYDROCHLORIDE 15 MG: 10 TABLET ORAL at 08:12

## 2024-12-01 RX ADMIN — HYDROXYUREA 1000 MG: 500 CAPSULE ORAL at 08:12

## 2024-12-01 RX ADMIN — PROMETHAZINE HYDROCHLORIDE 25 MG: 25 TABLET ORAL at 08:12

## 2024-12-01 RX ADMIN — ACETAMINOPHEN 1000 MG: 500 TABLET ORAL at 03:12

## 2024-12-01 RX ADMIN — FOLIC ACID 1 MG: 1 TABLET ORAL at 08:12

## 2024-12-01 RX ADMIN — ACETAMINOPHEN 1000 MG: 500 TABLET ORAL at 08:12

## 2024-12-01 RX ADMIN — GABAPENTIN 600 MG: 300 CAPSULE ORAL at 08:12

## 2024-12-01 RX ADMIN — APIXABAN 5 MG: 5 TABLET, FILM COATED ORAL at 08:12

## 2024-12-01 RX ADMIN — HYDROMORPHONE HYDROCHLORIDE 2 MG: 1 INJECTION, SOLUTION INTRAMUSCULAR; INTRAVENOUS; SUBCUTANEOUS at 05:12

## 2024-12-01 RX ADMIN — FLUTICASONE PROPIONATE 50 MCG: 50 SPRAY, METERED NASAL at 08:12

## 2024-12-01 RX ADMIN — PANTOPRAZOLE SODIUM 40 MG: 40 TABLET, DELAYED RELEASE ORAL at 08:12

## 2024-12-01 RX ADMIN — PROMETHAZINE HYDROCHLORIDE 25 MG: 25 TABLET ORAL at 12:12

## 2024-12-01 RX ADMIN — HYDROMORPHONE HYDROCHLORIDE 2 MG: 1 INJECTION, SOLUTION INTRAMUSCULAR; INTRAVENOUS; SUBCUTANEOUS at 11:12

## 2024-12-01 RX ADMIN — MORPHINE SULFATE 30 MG: 30 TABLET, FILM COATED, EXTENDED RELEASE ORAL at 09:12

## 2024-12-01 RX ADMIN — MUPIROCIN: 20 OINTMENT TOPICAL at 08:12

## 2024-12-01 RX ADMIN — HYDROMORPHONE HYDROCHLORIDE 2 MG: 1 INJECTION, SOLUTION INTRAMUSCULAR; INTRAVENOUS; SUBCUTANEOUS at 09:12

## 2024-12-01 RX ADMIN — MORPHINE SULFATE 30 MG: 30 TABLET, FILM COATED, EXTENDED RELEASE ORAL at 08:12

## 2024-12-01 RX ADMIN — HYDROMORPHONE HYDROCHLORIDE 2 MG: 1 INJECTION, SOLUTION INTRAMUSCULAR; INTRAVENOUS; SUBCUTANEOUS at 12:12

## 2024-12-01 RX ADMIN — FLUOXETINE HYDROCHLORIDE 40 MG: 20 CAPSULE ORAL at 08:12

## 2024-12-01 RX ADMIN — GABAPENTIN 600 MG: 300 CAPSULE ORAL at 03:12

## 2024-12-01 RX ADMIN — OXYCODONE HYDROCHLORIDE 15 MG: 10 TABLET ORAL at 12:12

## 2024-12-01 RX ADMIN — AMLODIPINE BESYLATE 10 MG: 10 TABLET ORAL at 08:12

## 2024-12-01 RX ADMIN — OXYCODONE HYDROCHLORIDE 15 MG: 10 TABLET ORAL at 03:12

## 2024-12-01 RX ADMIN — PROMETHAZINE HYDROCHLORIDE 25 MG: 25 TABLET ORAL at 04:12

## 2024-12-01 RX ADMIN — OXYCODONE HYDROCHLORIDE 15 MG: 10 TABLET ORAL at 04:12

## 2024-12-01 RX ADMIN — HYDROMORPHONE HYDROCHLORIDE 2 MG: 1 INJECTION, SOLUTION INTRAMUSCULAR; INTRAVENOUS; SUBCUTANEOUS at 01:12

## 2024-12-01 RX ADMIN — TIZANIDINE 4 MG: 4 TABLET ORAL at 03:12

## 2024-12-01 RX ADMIN — HYDROMORPHONE HYDROCHLORIDE 2 MG: 1 INJECTION, SOLUTION INTRAMUSCULAR; INTRAVENOUS; SUBCUTANEOUS at 06:12

## 2024-12-01 RX ADMIN — TIZANIDINE 4 MG: 4 TABLET ORAL at 02:12

## 2024-12-01 NOTE — SUBJECTIVE & OBJECTIVE
Interval History:  Seen and evaluated on general medical floor  No acute events overnight    Clinical status improved  No new complaints  Still having R ankle pain    Objective:     Vital Signs (Most Recent):  Temp: 97.9 °F (36.6 °C) (12/01/24 1107)  Pulse: 78 (12/01/24 1107)  Resp: 17 (12/01/24 1107)  BP: 132/88 (12/01/24 1107)  SpO2: 97 % (12/01/24 1107) Vital Signs (24h Range):  Temp:  [97 °F (36.1 °C)-98.3 °F (36.8 °C)] 97.9 °F (36.6 °C)  Pulse:  [66-87] 78  Resp:  [17-18] 17  SpO2:  [84 %-97 %] 97 %  BP: (110-150)/(72-88) 132/88     Weight: 94.3 kg (207 lb 14.3 oz)  Body mass index is 38.02 kg/m².  No intake or output data in the 24 hours ending 12/01/24 1123      Physical Exam  Vitals reviewed.   Constitutional:       Appearance: Normal appearance. She is obese. She is not ill-appearing.   HENT:      Head: Normocephalic and atraumatic.      Right Ear: External ear normal.      Left Ear: External ear normal.      Nose: Nose normal. No congestion or rhinorrhea.      Mouth/Throat:      Mouth: Mucous membranes are moist.      Pharynx: Oropharynx is clear.   Eyes:      General:         Right eye: No discharge.         Left eye: No discharge.      Extraocular Movements: Extraocular movements intact.      Conjunctiva/sclera: Conjunctivae normal.      Pupils: Pupils are equal, round, and reactive to light.   Cardiovascular:      Rate and Rhythm: Normal rate and regular rhythm.      Pulses: Normal pulses.      Heart sounds: Normal heart sounds. No murmur heard.  Pulmonary:      Effort: Pulmonary effort is normal. No respiratory distress.      Breath sounds: Normal breath sounds. No wheezing.   Abdominal:      General: Abdomen is flat. Bowel sounds are normal.      Palpations: Abdomen is soft.      Tenderness: There is no abdominal tenderness.   Musculoskeletal:         General: No swelling. Normal range of motion.      Cervical back: Normal range of motion.   Skin:     General: Skin is warm and dry.      Coloration:  Skin is not jaundiced.   Neurological:      General: No focal deficit present.      Mental Status: She is alert and oriented to person, place, and time.      Cranial Nerves: No cranial nerve deficit.   Psychiatric:         Mood and Affect: Mood normal.             Significant Labs: All pertinent labs within the past 24 hours have been reviewed.    Significant Imaging: I have reviewed all pertinent imaging results/findings within the past 24 hours.

## 2024-12-01 NOTE — PLAN OF CARE
[facility]  Discharge Final Note    Primary Care Provider: Pee Montgomery MD    Expected Discharge Date: 12/1/2024    Final Discharge Note (most recent)       Final Note - 12/01/24 1530          Final Note    Assessment Type Final Discharge Note     Anticipated Discharge Disposition Home or Self Care     Hospital Resources/Appts/Education Provided Appointments scheduled and added to AVS        Post-Acute Status    Post-Acute Authorization Other     Other Status No Post-Acute Service Needs     Discharge Delays None known at this time                     Important Message from Medicare

## 2024-12-01 NOTE — ASSESSMENT & PLAN NOTE
Pain control  Wean IV pain medications  Close monitoring of Hgb and hemodynamics - no indication for transfusion at this time  Oxygen supplementation to reduce sickling  Hydroxyurea daily  This patient is well known to this service  Needs to follow up with hematology  XR R ankle

## 2024-12-01 NOTE — PROGRESS NOTES
[facility] Medicine  Progress Note    Patient Name: Nazanin Malone  MRN: [id number]  Patient Class: IP- Inpatient   Admission Date: 11/29/2024  Length of Stay: 2 days  Attending Physician: Ricardo Bentley DO  Primary Care Provider: Pee Montgomery MD        Subjective:     Principal Problem:Vaso-occlusive pain due to sickle cell disease        HPI:  45yo female whose PMH includes of sickle cell, anemia, hypertension, SAMUEL who presents with pain.  Overnight she was started having pain in her left leg from the knee down.  Denies trauma.  She also reports swelling in her right ankle.  The pain is so bad that she was vomiting.  Notes mild shortness of breath secondary to vomiting.  Denies chest pain, fevers, chills, weakness, numbness.  Does report that she has a history of prior clots in his on Eliquis but missed a few doses.  Has been taking her oxycodone at home with no relief.    Overview/Hospital Course:  Admitted for sickle pain crisis.  No indication for transfusion or exchange transfusion.  Pain primarily in RLE; DVT study negative.  No trauma to RLE.    Interval History:  Seen and evaluated on general medical floor  No acute events overnight    Clinical status improved  No new complaints  Still having R ankle pain    Objective:     Vital Signs (Most Recent):  Temp: 97.9 °F (36.6 °C) (12/01/24 1107)  Pulse: 78 (12/01/24 1107)  Resp: 17 (12/01/24 1107)  BP: 132/88 (12/01/24 1107)  SpO2: 97 % (12/01/24 1107) Vital Signs (24h Range):  Temp:  [97 °F (36.1 °C)-98.3 °F (36.8 °C)] 97.9 °F (36.6 °C)  Pulse:  [66-87] 78  Resp:  [17-18] 17  SpO2:  [84 %-97 %] 97 %  BP: (110-150)/(72-88) 132/88     Weight: 94.3 kg (207 lb 14.3 oz)  Body mass index is 38.02 kg/m².  No intake or output data in the 24 hours ending 12/01/24 1123      Physical Exam  Vitals reviewed.   Constitutional:       Appearance: Normal appearance. She is obese. She is not ill-appearing.   HENT:      Head: Normocephalic and  atraumatic.      Right Ear: External ear normal.      Left Ear: External ear normal.      Nose: Nose normal. No congestion or rhinorrhea.      Mouth/Throat:      Mouth: Mucous membranes are moist.      Pharynx: Oropharynx is clear.   Eyes:      General:         Right eye: No discharge.         Left eye: No discharge.      Extraocular Movements: Extraocular movements intact.      Conjunctiva/sclera: Conjunctivae normal.      Pupils: Pupils are equal, round, and reactive to light.   Cardiovascular:      Rate and Rhythm: Normal rate and regular rhythm.      Pulses: Normal pulses.      Heart sounds: Normal heart sounds. No murmur heard.  Pulmonary:      Effort: Pulmonary effort is normal. No respiratory distress.      Breath sounds: Normal breath sounds. No wheezing.   Abdominal:      General: Abdomen is flat. Bowel sounds are normal.      Palpations: Abdomen is soft.      Tenderness: There is no abdominal tenderness.   Musculoskeletal:         General: No swelling. Normal range of motion.      Cervical back: Normal range of motion.   Skin:     General: Skin is warm and dry.      Coloration: Skin is not jaundiced.   Neurological:      General: No focal deficit present.      Mental Status: She is alert and oriented to person, place, and time.      Cranial Nerves: No cranial nerve deficit.   Psychiatric:         Mood and Affect: Mood normal.             Significant Labs: All pertinent labs within the past 24 hours have been reviewed.    Significant Imaging: I have reviewed all pertinent imaging results/findings within the past 24 hours.    Assessment/Plan:      * Vaso-occlusive pain due to sickle cell disease  Pain control  Wean IV pain medications  Close monitoring of Hgb and hemodynamics - no indication for transfusion at this time  Oxygen supplementation to reduce sickling  Hydroxyurea daily  This patient is well known to this service  Needs to follow up with hematology  XR R ankle    Drug-seeking behavior  Consistent  reassurance    Long term (current) use of anticoagulants  Cont eliquis    History of pulmonary embolism  Cont eliquis      Folate deficiency  Resume replacement      Sickle cell anemia  See plan for crisis      Iron deficiency anemia due to chronic blood loss  See plan for sickle crisis    Anxiety  Resume home regimen      Obesity (BMI 30-39.9)  Body mass index is 38.02 kg/m².  Would benefit from dietary and lifestyle modifications in relation to health  Consider dietary/nutrition consult outpatient    Hypertension  Resume home regimen  Adjust as indicated      VTE Risk Mitigation (From admission, onward)           Ordered     apixaban tablet 5 mg  2 times daily         11/29/24 1631     IP VTE HIGH RISK PATIENT  Once         11/29/24 1619     Place sequential compression device  Until discontinued         11/29/24 1619                    Discharge Planning   ALYSE: 12/1/2024     Code Status: Full Code   Is the patient medically ready for discharge?:     Reason for patient still in hospital (select all that apply): Patient trending condition  Discharge Plan A: Home with family                  Ricardo Bentley DO  Department of Hospital Medicine   [facility] Surg

## 2024-12-02 VITALS
OXYGEN SATURATION: 98 % | RESPIRATION RATE: 20 BRPM | SYSTOLIC BLOOD PRESSURE: 144 MMHG | BODY MASS INDEX: 38.25 KG/M2 | DIASTOLIC BLOOD PRESSURE: 93 MMHG | HEART RATE: 88 BPM | TEMPERATURE: 99 F | WEIGHT: 207.88 LBS | HEIGHT: 62 IN

## 2024-12-02 LAB
ALBUMIN SERPL BCP-MCNC: 3.1 G/DL (ref 3.5–5.2)
ANION GAP SERPL CALC-SCNC: 6 MMOL/L (ref 8–16)
BUN SERPL-MCNC: 18 MG/DL (ref 6–20)
CALCIUM SERPL-MCNC: 9.1 MG/DL (ref 8.7–10.5)
CHLORIDE SERPL-SCNC: 106 MMOL/L (ref 95–110)
CO2 SERPL-SCNC: 28 MMOL/L (ref 23–29)
CREAT SERPL-MCNC: 1.3 MG/DL (ref 0.5–1.4)
ERYTHROCYTE [DISTWIDTH] IN BLOOD BY AUTOMATED COUNT: 19.3 % (ref 11.5–14.5)
EST. GFR  (NO RACE VARIABLE): 51.4 ML/MIN/1.73 M^2
GLUCOSE SERPL-MCNC: 103 MG/DL (ref 70–110)
HCT VFR BLD AUTO: 25.3 % (ref 37–48.5)
HGB BLD-MCNC: 8.3 G/DL (ref 12–16)
MAGNESIUM SERPL-MCNC: 1.7 MG/DL (ref 1.6–2.6)
MCH RBC QN AUTO: 21.3 PG (ref 27–31)
MCHC RBC AUTO-ENTMCNC: 32.8 G/DL (ref 32–36)
MCV RBC AUTO: 65 FL (ref 82–98)
PHOSPHATE SERPL-MCNC: 3.8 MG/DL (ref 2.7–4.5)
PLATELET # BLD AUTO: 372 K/UL (ref 150–450)
PMV BLD AUTO: 10.1 FL (ref 9.2–12.9)
POTASSIUM SERPL-SCNC: 3.9 MMOL/L (ref 3.5–5.1)
RBC # BLD AUTO: 3.9 M/UL (ref 4–5.4)
SODIUM SERPL-SCNC: 140 MMOL/L (ref 136–145)
WBC # BLD AUTO: 6.86 K/UL (ref 3.9–12.7)

## 2024-12-02 PROCEDURE — 83735 ASSAY OF MAGNESIUM: CPT

## 2024-12-02 PROCEDURE — 80069 RENAL FUNCTION PANEL: CPT

## 2024-12-02 PROCEDURE — 94761 N-INVAS EAR/PLS OXIMETRY MLT: CPT

## 2024-12-02 PROCEDURE — 25000003 PHARM REV CODE 250

## 2024-12-02 PROCEDURE — 85027 COMPLETE CBC AUTOMATED: CPT

## 2024-12-02 PROCEDURE — 63600175 PHARM REV CODE 636 W HCPCS

## 2024-12-02 RX ORDER — TIZANIDINE 4 MG/1
4 TABLET ORAL EVERY 8 HOURS PRN
Qty: 90 TABLET | Refills: 0 | Status: ON HOLD | OUTPATIENT
Start: 2024-12-02

## 2024-12-02 RX ORDER — OXYCODONE HYDROCHLORIDE 15 MG/1
15 TABLET ORAL EVERY 4 HOURS PRN
Qty: 42 TABLET | Refills: 0 | Status: SHIPPED | OUTPATIENT
Start: 2024-12-02 | End: 2024-12-09 | Stop reason: SDUPTHER

## 2024-12-02 RX ORDER — HEPARIN 100 UNIT/ML
5 SYRINGE INTRAVENOUS ONCE
Status: COMPLETED | OUTPATIENT
Start: 2024-12-02 | End: 2024-12-02

## 2024-12-02 RX ADMIN — ACETAMINOPHEN 1000 MG: 500 TABLET ORAL at 08:12

## 2024-12-02 RX ADMIN — MORPHINE SULFATE 30 MG: 30 TABLET, FILM COATED, EXTENDED RELEASE ORAL at 08:12

## 2024-12-02 RX ADMIN — FLUTICASONE PROPIONATE 50 MCG: 50 SPRAY, METERED NASAL at 08:12

## 2024-12-02 RX ADMIN — FOLIC ACID 1 MG: 1 TABLET ORAL at 08:12

## 2024-12-02 RX ADMIN — GABAPENTIN 600 MG: 300 CAPSULE ORAL at 02:12

## 2024-12-02 RX ADMIN — OXYCODONE HYDROCHLORIDE 15 MG: 10 TABLET ORAL at 01:12

## 2024-12-02 RX ADMIN — HYDROMORPHONE HYDROCHLORIDE 2 MG: 1 INJECTION, SOLUTION INTRAMUSCULAR; INTRAVENOUS; SUBCUTANEOUS at 02:12

## 2024-12-02 RX ADMIN — APIXABAN 5 MG: 5 TABLET, FILM COATED ORAL at 09:12

## 2024-12-02 RX ADMIN — HYDROMORPHONE HYDROCHLORIDE 2 MG: 1 INJECTION, SOLUTION INTRAMUSCULAR; INTRAVENOUS; SUBCUTANEOUS at 06:12

## 2024-12-02 RX ADMIN — PROMETHAZINE HYDROCHLORIDE 25 MG: 25 TABLET ORAL at 08:12

## 2024-12-02 RX ADMIN — HEPARIN 500 UNITS: 100 SYRINGE at 03:12

## 2024-12-02 RX ADMIN — FLUOXETINE HYDROCHLORIDE 40 MG: 20 CAPSULE ORAL at 08:12

## 2024-12-02 RX ADMIN — TIZANIDINE 4 MG: 4 TABLET ORAL at 11:12

## 2024-12-02 RX ADMIN — OXYCODONE HYDROCHLORIDE 15 MG: 10 TABLET ORAL at 09:12

## 2024-12-02 RX ADMIN — GABAPENTIN 600 MG: 300 CAPSULE ORAL at 08:12

## 2024-12-02 RX ADMIN — HYDROXYUREA 1000 MG: 500 CAPSULE ORAL at 09:12

## 2024-12-02 RX ADMIN — Medication 250 MG: at 09:12

## 2024-12-02 RX ADMIN — PROMETHAZINE HYDROCHLORIDE 25 MG: 25 TABLET ORAL at 01:12

## 2024-12-02 RX ADMIN — AMLODIPINE BESYLATE 10 MG: 10 TABLET ORAL at 08:12

## 2024-12-02 RX ADMIN — MUPIROCIN: 20 OINTMENT TOPICAL at 09:12

## 2024-12-02 RX ADMIN — OXYCODONE HYDROCHLORIDE 15 MG: 10 TABLET ORAL at 05:12

## 2024-12-02 RX ADMIN — PANTOPRAZOLE SODIUM 40 MG: 40 TABLET, DELAYED RELEASE ORAL at 09:12

## 2024-12-02 RX ADMIN — HYDROMORPHONE HYDROCHLORIDE 2 MG: 1 INJECTION, SOLUTION INTRAMUSCULAR; INTRAVENOUS; SUBCUTANEOUS at 10:12

## 2024-12-02 NOTE — NURSING
Port was hep locked and de-accessed by nurse. AVS/discharge instructions explained to pt. Wheelchair escort has been in for over an hour and has not been acknowledged. Pt was taken down by tech to get her medications and then transport to her car. She will be driving self home. Pt has left with all belongings and script for pain medication.

## 2024-12-03 NOTE — DISCHARGE SUMMARY
[facility] Medicine  Discharge Summary      Patient Name: Nazanin Malone  MRN: [id number]  VLADIMIR: [id number]  Patient Class: IP- Inpatient  Admission Date: 11/29/2024  Hospital Length of Stay: 3 days  Discharge Date and Time: 12/2/2024  6:29 PM  Attending Physician: No att. providers found   Discharging Provider: Ricardo Bentley DO  Primary Care Provider: Pee Montgomery MD  [facility] Medicine Team: [facility] Ricardo Bentley DO  Primary Care Team: [facility]    HPI:   43yo female whose PMH includes of sickle cell, anemia, hypertension, SAMUEL who presents with pain.  Overnight she was started having pain in her left leg from the knee down.  Denies trauma.  She also reports swelling in her right ankle.  The pain is so bad that she was vomiting.  Notes mild shortness of breath secondary to vomiting.  Denies chest pain, fevers, chills, weakness, numbness.  Does report that she has a history of prior clots in his on Eliquis but missed a few doses.  Has been taking her oxycodone at home with no relief.    * No surgery found *      Hospital Course:   Admitted for sickle pain crisis.  No indication for transfusion or exchange transfusion.  Pain primarily in RLE; DVT study negative.  No trauma to RLE; plain film negative for fracture of the ankle which is primarily where her pain was.  Pain resolved.  Pt deemed appropriate for discharge; seen and examined prior to departure. Plan discussed with pt, who was agreeable and amenable; medications were discussed and reviewed, outpatient follow-up scheduled, ER precautions were given, all questions were answered to the pt's satisfaction, and was subsequently discharged.     Goals of Care Treatment Preferences:  Code Status: Full Code      SDOH Screening:  The patient was screened for utility difficulties, food insecurity, transport difficulties, housing insecurity, and interpersonal safety and there were no concerns identified this admission.      Consults:     * Vaso-occlusive pain due to sickle cell disease  Pain control  Wean IV pain medications  Close monitoring of Hgb and hemodynamics - no indication for transfusion at this time  Oxygen supplementation to reduce sickling  Hydroxyurea daily  This patient is well known to this service  Needs to follow up with hematology  XR R ankle    Drug-seeking behavior  Consistent reassurance    Long term (current) use of anticoagulants  Cont eliquis    History of pulmonary embolism  Cont eliquis      Folate deficiency  Resume replacement      Sickle cell anemia  See plan for crisis      Iron deficiency anemia due to chronic blood loss  See plan for sickle crisis    Anxiety  Resume home regimen      Obesity (BMI 30-39.9)  Body mass index is 38.02 kg/m².  Would benefit from dietary and lifestyle modifications in relation to health  Consider dietary/nutrition consult outpatient    Hypertension  Resume home regimen  Adjust as indicated      Final Active Diagnoses:    Diagnosis Date Noted POA    PRINCIPAL PROBLEM:  Vaso-occlusive pain due to sickle cell disease [D57.00] 04/16/2017 Yes    Drug-seeking behavior [Z76.5] 10/22/2024 Yes    Long term (current) use of anticoagulants [Z79.01] 08/16/2024 Not Applicable    History of pulmonary embolism [Z86.711] 06/08/2020 Yes    Folate deficiency [E53.8] 01/10/2020 Yes    Sickle cell anemia [D57.1] 01/09/2020 Yes    Iron deficiency anemia due to chronic blood loss [D50.0] 12/27/2019 Yes    Anxiety [F41.9] 03/20/2018 Yes    Obesity (BMI 30-39.9) [E66.9] 04/25/2017 Yes    Hypertension [I10] 04/16/2017 Yes      Problems Resolved During this Admission:       Discharged Condition: good    Disposition: Home or Self Care    Follow Up:    Patient Instructions:      Diet Adult Regular     Diet Adult Regular     Notify your health care provider if you experience any of the following:  severe uncontrolled pain     Notify your health care provider if you experience any of the following:   severe uncontrolled pain     Activity as tolerated       Significant Diagnostic Studies: N/A    Pending Diagnostic Studies:       None           Medications:  Reconciled Home Medications:      Medication List        START taking these medications      hydroxyurea 500 mg Cap  Commonly known as: HYDREA  Take 2 capsules (1,000 mg total) by mouth once daily.            CONTINUE taking these medications      acetaminophen 500 MG tablet  Commonly known as: TYLENOL  Take 1,000 mg by mouth every 8 (eight) hours as needed for Pain.     albuterol 90 mcg/actuation inhaler  Commonly known as: VENTOLIN HFA  Inhale 2 puffs into the lungs every 6 (six) hours as needed for Wheezing or Shortness of Breath. Rescue     amLODIPine 10 MG tablet  Commonly known as: NORVASC  Take 1 tablet (10 mg total) by mouth once daily.     apixaban 5 mg Tab  Commonly known as: ELIQUIS  Take 1 tablet (5 mg total) by mouth 2 (two) times daily.     dicyclomine 10 MG capsule  Commonly known as: BENTYL  Take 1 capsule (10 mg total) by mouth 4 (four) times daily as needed (stomach cramps).     FLUoxetine 40 MG capsule  Take 1 capsule (40 mg total) by mouth once daily.     fluticasone propionate 50 mcg/actuation nasal spray  Commonly known as: FLONASE  INSTILL 1 SPRAY INTO EACH NOSTRIL EVERY DAY     folic acid 1 MG tablet  Commonly known as: FOLVITE  Take 1 tablet (1 mg total) by mouth once daily.     gabapentin 300 MG capsule  Commonly known as: NEURONTIN  Take 2 capsules (600 mg total) by mouth 3 (three) times daily.     hydrOXYzine pamoate 25 MG Cap  Commonly known as: VISTARIL  Take 1 capsule (25 mg total) by mouth every 4 (four) hours as needed (Anxiety).     morphine 30 MG 12 hr tablet  Commonly known as: MS CONTIN  Take 1 tablet (30 mg total) by mouth every 12 (twelve) hours. for 21 days     oxyCODONE 15 MG Tab  Commonly known as: ROXICODONE  Take 1 tablet (15 mg total) by mouth every 4 (four) hours as needed for Pain.     pantoprazole 40 MG  tablet  Commonly known as: PROTONIX  Take 1 tablet (40 mg total) by mouth once daily.     promethazine 12.5 MG Tab  Commonly known as: PHENERGAN  Take 2 tablets (25 mg total) by mouth 4 (four) times daily.     senna-docusate 8.6-50 mg 8.6-50 mg per tablet  Commonly known as: PERICOLACE  Take 1 tablet by mouth daily as needed for Constipation.     tiZANidine 4 MG tablet  Commonly known as: ZANAFLEX  Take 1 tablet (4 mg total) by mouth every 8 (eight) hours as needed (Muscle pain).     VITAMIN C ORAL  Take 1 capsule by mouth once daily.            STOP taking these medications      scopolamine 1.3-1.5 mg (1 mg over 3 days)  Commonly known as: TRANSDERM-SCOP            ASK your doctor about these medications      polyethylene glycol 17 gram/dose powder  Commonly known as: GLYCOLAX  Use to cap to measure 17g, mix with liquid, and take by mouth once daily.  Ask about: Should I take this medication?              Indwelling Lines/Drains at time of discharge:   Lines/Drains/Airways       Central Venous Catheter Line  Duration             Port A Cath Single Lumen 11/29/23 1124 Atrial Right 370 days                    Time spent on the discharge of patient: >35 minutes         Ricardo Bentley DO  Department of Hospital Medicine  [facility] Surg

## 2024-12-03 NOTE — PLAN OF CARE
[facility] - [facility]  Discharge Final Note    Primary Care Provider: Pee Montgomery MD    Expected Discharge Date: 12/2/2024    Final Discharge Note (most recent)       Final Note - 12/03/24 0847          Final Note    Assessment Type Final Discharge Note (P)      Anticipated Discharge Disposition Home or Self Care (P)         Post-Acute Status    Post-Acute Authorization Other (P)      Other Status No Post-Acute Service Needs (P)      Discharge Delays None known at this time (P)                      Important Message from Medicare  Important Message from Medicare regarding Discharge Appeal Rights: Explained to patient/caregiver, Signed/date by patient/caregiver, Given to patient/caregiver     Date IMM was signed: 12/02/24  Time IMM was signed: 1128    Pt discharged home, attempted to set up PCP but insurance plan was out of network. ,Pt was informed to call insurance company to locate PCP within network for follow up.

## 2024-12-09 ENCOUNTER — HOSPITAL ENCOUNTER (INPATIENT)
Facility: HOSPITAL | Age: 44
LOS: 8 days | Discharge: HOME | DRG: 812 | End: 2024-12-18
Attending: STUDENT IN AN ORGANIZED HEALTH CARE EDUCATION/TRAINING PROGRAM | Admitting: STUDENT IN AN ORGANIZED HEALTH CARE EDUCATION/TRAINING PROGRAM
Payer: MEDICARE

## 2024-12-09 DIAGNOSIS — K21.9 GASTROESOPHAGEAL REFLUX DISEASE, UNSPECIFIED WHETHER ESOPHAGITIS PRESENT: ICD-10-CM

## 2024-12-09 DIAGNOSIS — D57.00 SICKLE CELL PAIN CRISIS: ICD-10-CM

## 2024-12-09 DIAGNOSIS — F32.A ANXIETY AND DEPRESSION: Primary | Chronic | ICD-10-CM

## 2024-12-09 DIAGNOSIS — R10.9 ABDOMINAL PAIN: ICD-10-CM

## 2024-12-09 DIAGNOSIS — R05.9 COUGH: ICD-10-CM

## 2024-12-09 DIAGNOSIS — F41.9 ANXIETY AND DEPRESSION: Primary | Chronic | ICD-10-CM

## 2024-12-09 DIAGNOSIS — R07.9 CHEST PAIN: ICD-10-CM

## 2024-12-09 LAB
ALBUMIN SERPL BCP-MCNC: 3.9 G/DL (ref 3.5–5.2)
ALP SERPL-CCNC: 106 U/L (ref 40–150)
ALT SERPL W/O P-5'-P-CCNC: 23 U/L (ref 10–44)
ANION GAP SERPL CALC-SCNC: 12 MMOL/L (ref 8–16)
AST SERPL-CCNC: 27 U/L (ref 10–40)
BASOPHILS # BLD AUTO: 0.05 K/UL (ref 0–0.2)
BASOPHILS NFR BLD: 0.4 % (ref 0–1.9)
BILIRUB SERPL-MCNC: 0.8 MG/DL (ref 0.1–1)
BNP SERPL-MCNC: <10 PG/ML (ref 0–99)
BUN SERPL-MCNC: 14 MG/DL (ref 6–20)
CALCIUM SERPL-MCNC: 9.8 MG/DL (ref 8.7–10.5)
CHLORIDE SERPL-SCNC: 108 MMOL/L (ref 95–110)
CO2 SERPL-SCNC: 19 MMOL/L (ref 23–29)
CREAT SERPL-MCNC: 1.4 MG/DL (ref 0.5–1.4)
DIFFERENTIAL METHOD BLD: ABNORMAL
EOSINOPHIL # BLD AUTO: 0.1 K/UL (ref 0–0.5)
EOSINOPHIL NFR BLD: 0.9 % (ref 0–8)
ERYTHROCYTE [DISTWIDTH] IN BLOOD BY AUTOMATED COUNT: 19.9 % (ref 11.5–14.5)
EST. GFR  (NO RACE VARIABLE): 47 ML/MIN/1.73 M^2
GLUCOSE SERPL-MCNC: 75 MG/DL (ref 70–110)
HCT VFR BLD AUTO: 31 % (ref 37–48.5)
HGB BLD-MCNC: 10.5 G/DL (ref 12–16)
IMM GRANULOCYTES # BLD AUTO: 0.04 K/UL (ref 0–0.04)
IMM GRANULOCYTES NFR BLD AUTO: 0.3 % (ref 0–0.5)
LIPASE SERPL-CCNC: 25 U/L (ref 4–60)
LYMPHOCYTES # BLD AUTO: 1.7 K/UL (ref 1–4.8)
LYMPHOCYTES NFR BLD: 14.3 % (ref 18–48)
MCH RBC QN AUTO: 21.3 PG (ref 27–31)
MCHC RBC AUTO-ENTMCNC: 33.9 G/DL (ref 32–36)
MCV RBC AUTO: 63 FL (ref 82–98)
MONOCYTES # BLD AUTO: 1.1 K/UL (ref 0.3–1)
MONOCYTES NFR BLD: 9.2 % (ref 4–15)
NEUTROPHILS # BLD AUTO: 8.7 K/UL (ref 1.8–7.7)
NEUTROPHILS NFR BLD: 74.9 % (ref 38–73)
NRBC BLD-RTO: 1 /100 WBC
PLATELET # BLD AUTO: 474 K/UL (ref 150–450)
PMV BLD AUTO: 9.4 FL (ref 9.2–12.9)
POTASSIUM SERPL-SCNC: 3.7 MMOL/L (ref 3.5–5.1)
PROT SERPL-MCNC: 8.5 G/DL (ref 6–8.4)
RBC # BLD AUTO: 4.93 M/UL (ref 4–5.4)
RETICS/RBC NFR AUTO: 1.1 % (ref 0.5–2.5)
SODIUM SERPL-SCNC: 139 MMOL/L (ref 136–145)
TROPONIN I SERPL DL<=0.01 NG/ML-MCNC: <3 NG/L (ref 0–14)
WBC # BLD AUTO: 11.61 K/UL (ref 3.9–12.7)

## 2024-12-09 PROCEDURE — 80053 COMPREHEN METABOLIC PANEL: CPT | Performed by: EMERGENCY MEDICINE

## 2024-12-09 PROCEDURE — 63600175 PHARM REV CODE 636 W HCPCS

## 2024-12-09 PROCEDURE — 83690 ASSAY OF LIPASE: CPT | Performed by: EMERGENCY MEDICINE

## 2024-12-09 PROCEDURE — 84484 ASSAY OF TROPONIN QUANT: CPT

## 2024-12-09 PROCEDURE — G0378 HOSPITAL OBSERVATION PER HR: HCPCS

## 2024-12-09 PROCEDURE — 83880 ASSAY OF NATRIURETIC PEPTIDE: CPT

## 2024-12-09 PROCEDURE — 85045 AUTOMATED RETICULOCYTE COUNT: CPT | Performed by: EMERGENCY MEDICINE

## 2024-12-09 PROCEDURE — 96376 TX/PRO/DX INJ SAME DRUG ADON: CPT

## 2024-12-09 PROCEDURE — 63600175 PHARM REV CODE 636 W HCPCS: Performed by: STUDENT IN AN ORGANIZED HEALTH CARE EDUCATION/TRAINING PROGRAM

## 2024-12-09 PROCEDURE — 93010 ELECTROCARDIOGRAM REPORT: CPT | Mod: ,,, | Performed by: INTERNAL MEDICINE

## 2024-12-09 PROCEDURE — 96374 THER/PROPH/DIAG INJ IV PUSH: CPT

## 2024-12-09 PROCEDURE — 85025 COMPLETE CBC W/AUTO DIFF WBC: CPT | Performed by: EMERGENCY MEDICINE

## 2024-12-09 PROCEDURE — 93005 ELECTROCARDIOGRAM TRACING: CPT

## 2024-12-09 PROCEDURE — 96375 TX/PRO/DX INJ NEW DRUG ADDON: CPT

## 2024-12-09 RX ORDER — METHOCARBAMOL 750 MG/1
1500 TABLET, FILM COATED ORAL 4 TIMES DAILY
Status: COMPLETED | OUTPATIENT
Start: 2024-12-10 | End: 2024-12-12

## 2024-12-09 RX ORDER — SODIUM CHLORIDE 0.9 % (FLUSH) 0.9 %
5 SYRINGE (ML) INJECTION
Status: DISCONTINUED | OUTPATIENT
Start: 2024-12-10 | End: 2024-12-18 | Stop reason: HOSPADM

## 2024-12-09 RX ORDER — GABAPENTIN 300 MG/1
600 CAPSULE ORAL 3 TIMES DAILY
Status: DISCONTINUED | OUTPATIENT
Start: 2024-12-09 | End: 2024-12-18 | Stop reason: HOSPADM

## 2024-12-09 RX ORDER — PROCHLORPERAZINE EDISYLATE 5 MG/ML
5 INJECTION INTRAMUSCULAR; INTRAVENOUS EVERY 6 HOURS PRN
Status: DISCONTINUED | OUTPATIENT
Start: 2024-12-10 | End: 2024-12-18 | Stop reason: HOSPADM

## 2024-12-09 RX ORDER — NALOXONE HCL 0.4 MG/ML
0.02 VIAL (ML) INJECTION
Status: DISCONTINUED | OUTPATIENT
Start: 2024-12-10 | End: 2024-12-18 | Stop reason: HOSPADM

## 2024-12-09 RX ORDER — HYDROMORPHONE HYDROCHLORIDE 1 MG/ML
2 INJECTION, SOLUTION INTRAMUSCULAR; INTRAVENOUS; SUBCUTANEOUS
Status: COMPLETED | OUTPATIENT
Start: 2024-12-09 | End: 2024-12-09

## 2024-12-09 RX ORDER — HYDROMORPHONE HYDROCHLORIDE 1 MG/ML
2 INJECTION, SOLUTION INTRAMUSCULAR; INTRAVENOUS; SUBCUTANEOUS
Status: DISCONTINUED | OUTPATIENT
Start: 2024-12-09 | End: 2024-12-09

## 2024-12-09 RX ORDER — TALC
6 POWDER (GRAM) TOPICAL NIGHTLY PRN
Status: DISCONTINUED | OUTPATIENT
Start: 2024-12-10 | End: 2024-12-18 | Stop reason: HOSPADM

## 2024-12-09 RX ORDER — ACETAMINOPHEN 500 MG
1000 TABLET ORAL EVERY 8 HOURS
Status: DISCONTINUED | OUTPATIENT
Start: 2024-12-10 | End: 2024-12-18 | Stop reason: HOSPADM

## 2024-12-09 RX ORDER — PANTOPRAZOLE SODIUM 40 MG/1
40 TABLET, DELAYED RELEASE ORAL DAILY
Status: DISCONTINUED | OUTPATIENT
Start: 2024-12-10 | End: 2024-12-15

## 2024-12-09 RX ORDER — BISACODYL 10 MG/1
10 SUPPOSITORY RECTAL DAILY PRN
Status: DISCONTINUED | OUTPATIENT
Start: 2024-12-10 | End: 2024-12-18 | Stop reason: HOSPADM

## 2024-12-09 RX ORDER — HYDROXYZINE PAMOATE 25 MG/1
50 CAPSULE ORAL EVERY 6 HOURS PRN
Status: DISCONTINUED | OUTPATIENT
Start: 2024-12-10 | End: 2024-12-18 | Stop reason: HOSPADM

## 2024-12-09 RX ORDER — HYDROMORPHONE HYDROCHLORIDE 1 MG/ML
0.5 INJECTION, SOLUTION INTRAMUSCULAR; INTRAVENOUS; SUBCUTANEOUS EVERY 4 HOURS
Status: DISCONTINUED | OUTPATIENT
Start: 2024-12-10 | End: 2024-12-10

## 2024-12-09 RX ORDER — PROCHLORPERAZINE EDISYLATE 5 MG/ML
2.5 INJECTION INTRAMUSCULAR; INTRAVENOUS
Status: COMPLETED | OUTPATIENT
Start: 2024-12-09 | End: 2024-12-09

## 2024-12-09 RX ORDER — IPRATROPIUM BROMIDE AND ALBUTEROL SULFATE 2.5; .5 MG/3ML; MG/3ML
3 SOLUTION RESPIRATORY (INHALATION) EVERY 4 HOURS PRN
Status: DISCONTINUED | OUTPATIENT
Start: 2024-12-10 | End: 2024-12-18 | Stop reason: HOSPADM

## 2024-12-09 RX ORDER — FLUTICASONE PROPIONATE 50 MCG
1 SPRAY, SUSPENSION (ML) NASAL DAILY PRN
Status: DISCONTINUED | OUTPATIENT
Start: 2024-12-10 | End: 2024-12-18 | Stop reason: HOSPADM

## 2024-12-09 RX ORDER — OXYCODONE HYDROCHLORIDE 15 MG/1
15 TABLET ORAL EVERY 4 HOURS PRN
Qty: 180 TABLET | Refills: 0 | Status: ON HOLD | OUTPATIENT
Start: 2024-12-09

## 2024-12-09 RX ORDER — SUCRALFATE 1 G/10ML
1 SUSPENSION ORAL EVERY 6 HOURS
Status: DISCONTINUED | OUTPATIENT
Start: 2024-12-10 | End: 2024-12-18 | Stop reason: HOSPADM

## 2024-12-09 RX ORDER — ACETAMINOPHEN 500 MG
1000 TABLET ORAL
Status: DISCONTINUED | OUTPATIENT
Start: 2024-12-09 | End: 2024-12-09

## 2024-12-09 RX ORDER — HYDROMORPHONE HYDROCHLORIDE 1 MG/ML
1 INJECTION, SOLUTION INTRAMUSCULAR; INTRAVENOUS; SUBCUTANEOUS
Status: COMPLETED | OUTPATIENT
Start: 2024-12-09 | End: 2024-12-09

## 2024-12-09 RX ORDER — SIMETHICONE 80 MG
1 TABLET,CHEWABLE ORAL 4 TIMES DAILY PRN
Status: DISCONTINUED | OUTPATIENT
Start: 2024-12-10 | End: 2024-12-18 | Stop reason: HOSPADM

## 2024-12-09 RX ORDER — METHOCARBAMOL 750 MG/1
1500 TABLET, FILM COATED ORAL 3 TIMES DAILY
Status: COMPLETED | OUTPATIENT
Start: 2024-12-13 | End: 2024-12-15

## 2024-12-09 RX ORDER — ALUMINUM HYDROXIDE, MAGNESIUM HYDROXIDE, AND SIMETHICONE 1200; 120; 1200 MG/30ML; MG/30ML; MG/30ML
30 SUSPENSION ORAL 4 TIMES DAILY PRN
Status: DISCONTINUED | OUTPATIENT
Start: 2024-12-10 | End: 2024-12-18 | Stop reason: HOSPADM

## 2024-12-09 RX ORDER — FOLIC ACID 1 MG/1
1 TABLET ORAL DAILY
Status: DISCONTINUED | OUTPATIENT
Start: 2024-12-10 | End: 2024-12-18 | Stop reason: HOSPADM

## 2024-12-09 RX ORDER — POLYETHYLENE GLYCOL 3350 17 G/17G
17 POWDER, FOR SOLUTION ORAL DAILY PRN
Status: DISCONTINUED | OUTPATIENT
Start: 2024-12-10 | End: 2024-12-18 | Stop reason: HOSPADM

## 2024-12-09 RX ORDER — PROMETHAZINE HYDROCHLORIDE 12.5 MG/1
25 TABLET ORAL 4 TIMES DAILY
Qty: 120 TABLET | Refills: 1 | Status: ON HOLD | OUTPATIENT
Start: 2024-12-09

## 2024-12-09 RX ORDER — FLUOXETINE HYDROCHLORIDE 20 MG/1
40 CAPSULE ORAL DAILY
Status: DISCONTINUED | OUTPATIENT
Start: 2024-12-10 | End: 2024-12-18 | Stop reason: HOSPADM

## 2024-12-09 RX ORDER — MORPHINE SULFATE 30 MG/1
30 TABLET, FILM COATED, EXTENDED RELEASE ORAL EVERY 12 HOURS
Status: DISCONTINUED | OUTPATIENT
Start: 2024-12-10 | End: 2024-12-18 | Stop reason: HOSPADM

## 2024-12-09 RX ORDER — SODIUM CHLORIDE, SODIUM LACTATE, POTASSIUM CHLORIDE, CALCIUM CHLORIDE 600; 310; 30; 20 MG/100ML; MG/100ML; MG/100ML; MG/100ML
INJECTION, SOLUTION INTRAVENOUS CONTINUOUS
Status: ACTIVE | OUTPATIENT
Start: 2024-12-10 | End: 2024-12-11

## 2024-12-09 RX ORDER — AMOXICILLIN 250 MG
1 CAPSULE ORAL DAILY PRN
Status: DISCONTINUED | OUTPATIENT
Start: 2024-12-10 | End: 2024-12-18 | Stop reason: HOSPADM

## 2024-12-09 RX ORDER — PROMETHAZINE HYDROCHLORIDE 12.5 MG/1
25 TABLET ORAL EVERY 6 HOURS PRN
Status: DISCONTINUED | OUTPATIENT
Start: 2024-12-10 | End: 2024-12-18 | Stop reason: HOSPADM

## 2024-12-09 RX ORDER — DIPHENHYDRAMINE HCL 50 MG
50 CAPSULE ORAL EVERY 4 HOURS PRN
Status: DISCONTINUED | OUTPATIENT
Start: 2024-12-10 | End: 2024-12-15

## 2024-12-09 RX ORDER — HYDROXYUREA 500 MG/1
1000 CAPSULE ORAL DAILY
Status: DISCONTINUED | OUTPATIENT
Start: 2024-12-10 | End: 2024-12-18 | Stop reason: HOSPADM

## 2024-12-09 RX ADMIN — HYDROMORPHONE HYDROCHLORIDE 2 MG: 1 INJECTION, SOLUTION INTRAMUSCULAR; INTRAVENOUS; SUBCUTANEOUS at 08:12

## 2024-12-09 RX ADMIN — PROCHLORPERAZINE EDISYLATE 2.5 MG: 5 INJECTION INTRAMUSCULAR; INTRAVENOUS at 04:12

## 2024-12-09 RX ADMIN — HYDROMORPHONE HYDROCHLORIDE 2 MG: 1 INJECTION, SOLUTION INTRAMUSCULAR; INTRAVENOUS; SUBCUTANEOUS at 05:12

## 2024-12-09 RX ADMIN — PROCHLORPERAZINE EDISYLATE 2.5 MG: 5 INJECTION INTRAMUSCULAR; INTRAVENOUS at 08:12

## 2024-12-09 RX ADMIN — HYDROMORPHONE HYDROCHLORIDE 1 MG: 1 INJECTION, SOLUTION INTRAMUSCULAR; INTRAVENOUS; SUBCUTANEOUS at 04:12

## 2024-12-09 NOTE — ED NOTES
APPEARANCE: awake and alert in NAD. PAIN 10/10  SKIN: warm, dry and intact. No breakdown or bruising.  MUSCULOSKELETAL: Patient moving all extremities spontaneously, no obvious swelling or deformities noted. Ambulates independently. Endorses generalized weakness.   RESPIRATORY: Denies shortness of breath.Respirations unlabored.   CARDIAC: endorses CP, 2+ distal pulses; no peripheral edema  ABDOMEN: S/ND/NT, endorses nausea without vomiting and diarrhea.  : voids spontaneously, denies difficulty  Neurologic: AAO x 4; follows commands equal strength in all extremities; denies numbness/tingling. Denies dizziness

## 2024-12-09 NOTE — TELEPHONE ENCOUNTER
----- Message from Violette sent at 12/9/2024 12:01 PM CST -----  Regarding: Refill  Contact: Pt  [phone]        Rx Name:  oxyCODONE (ROXICODONE) 15 MG Tab                    promethazine (PHENERGAN) 12.5 MG Tab      Preferred Pharmacy with number:   [facility]  [address] [address]  Phone: [phone] Fax: [phone]       Ordering Provider:  Ricardo Bentley DO    Contact preference: [phone]    Comments/Notes: Requesting refill

## 2024-12-09 NOTE — ED TRIAGE NOTES
Nazanin Malone, a 44 y.o. female presents to the ED w/ complaint of sickle cell pain.  Endorses generalized weakness, chest pain, abd pain, nausea and diarrhea.

## 2024-12-09 NOTE — ED PROVIDER NOTES
Encounter Date: 2024       History     Chief Complaint   Patient presents with    Abdominal Pain     Per EMS pt from home, pt c/o upper abdominal pain radiating to chest, nausea, diarrhea x 3 days. GCS 15.     Patient is a 44-year-old with past medical history of sickle cell disease presenting due to abdominal pain, nausea, diarrhea, chest pain, shortness of breath.  States symptoms have been getting worse for the past couple of days.  Her home medications are not working.  Denies any fevers.  Denies any blood in her stool.  States her diarrhea is watery and multiple episodes that day.  Reports that when she has sickle cell pain exacerbations this is typically how it presents.  States she has persistent headaches.  States her headaches are generalized and are not the worst headache that she has ever had.        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    Asthma     Depression     Hypertension     Morbid obesity     Opioid dependence 2017    Pneumonia due to Streptococcus pneumoniae 2017    Right lower lobe pneumonia 2017    Sepsis due to Streptococcus pneumoniae 2017    Sickle cell-beta thalassemia disease with pain     Trigeminal neuralgia      Past Surgical History:   Procedure Laterality Date     SECTION      ESOPHAGOGASTRODUODENOSCOPY N/A 2024    Procedure: EGD (ESOPHAGOGASTRODUODENOSCOPY);  Surgeon: Allen Marshall MD;  Location: [address]);  Service: Gastroenterology;  Laterality: N/A;    TONSILLECTOMY      TUBAL LIGATION       Family History   Problem Relation Name Age of Onset    Heart disease Mother      Diabetes Mother      Heart disease Father       Breast cancer Neg Hx      Ovarian cancer Neg Hx      Colon cancer Neg Hx       Social History     Tobacco Use    Smoking status: Never    Smokeless tobacco: Never   Substance Use Topics    Alcohol use: No    Drug use: Yes     Types: Marijuana     Comment: periodically      Review of Systems  Per HPI  Physical Exam     Initial Vitals [12/09/24 1414]   BP Pulse Resp Temp SpO2   114/79 77 18 98.2 °F (36.8 °C) 100 %      MAP       --         Physical Exam    Constitutional: She appears well-developed and well-nourished. She is not diaphoretic. She appears distressed.   Eyes: No scleral icterus.   Cardiovascular:  Normal rate, regular rhythm and normal heart sounds.     Exam reveals no friction rub.       No murmur heard.  Pulmonary/Chest: Breath sounds normal. No respiratory distress. She has no wheezes. She has no rhonchi. She has no rales.   Abdominal: Abdomen is soft. She exhibits no distension. There is abdominal tenderness.   Tenderness to right upper and left upper quadrants.  Tenderness to deep palpation.  No guarding or rebound. There is no rebound and no guarding.   Musculoskeletal:         General: No tenderness or edema.     Neurological: She is alert. GCS score is 15. GCS eye subscore is 4. GCS verbal subscore is 5. GCS motor subscore is 6.   Skin: Skin is warm and dry.   Psychiatric: She has a normal mood and affect.         ED Course   Procedures  Labs Reviewed   CBC W/ AUTO DIFFERENTIAL - Abnormal       Result Value    WBC 11.61      RBC 4.93      Hemoglobin 10.5 (*)     Hematocrit 31.0 (*)     MCV 63 (*)     MCH 21.3 (*)     MCHC 33.9      RDW 19.9 (*)     Platelets 474 (*)     MPV 9.4      Immature Granulocytes 0.3      Gran # (ANC) 8.7 (*)     Immature Grans (Abs) 0.04      Lymph # 1.7      Mono # 1.1 (*)     Eos # 0.1      Baso # 0.05      nRBC 1 (*)     Gran % 74.9 (*)     Lymph % 14.3 (*)     Mono % 9.2      Eosinophil % 0.9      Basophil % 0.4      Differential Method Automated      COMPREHENSIVE METABOLIC PANEL - Abnormal    Sodium 139      Potassium 3.7      Chloride 108      CO2 19 (*)     Glucose 75      BUN 14      Creatinine 1.4      Calcium 9.8      Total Protein 8.5 (*)     Albumin 3.9      Total Bilirubin 0.8      Alkaline Phosphatase 106      AST 27      ALT 23      eGFR 47.0 (*)     Anion Gap 12     LIPASE    Lipase 25     RETICULOCYTES   RETICULOCYTES    Retic 1.1      Narrative:     add on reticulocytes [id number] per reyna redding MD 18:58    12/09/2024    TROPONIN I HIGH SENSITIVITY    Troponin I High Sensitivity <3     B-TYPE NATRIURETIC PEPTIDE    BNP <10     URINALYSIS, REFLEX TO URINE CULTURE   POCT URINE PREGNANCY   ISTAT CHEM8          Imaging Results              X-Ray Chest AP Portable (Final result)  Result time 12/09/24 17:18:34      Final result by Kd Warren MD (12/09/24 17:18:34)                   Impression:      1. Interstitial findings are accentuated by habitus, no large focal consolidation.      Electronically signed by: Kd Warren MD  Date:    12/09/2024  Time:    17:18               Narrative:    EXAMINATION:  XR CHEST AP PORTABLE    CLINICAL HISTORY:  Cough, unspecified    TECHNIQUE:  Single frontal view of the chest was performed.    COMPARISON:  11/11/2024    FINDINGS:  Right chest wall port catheter tip projects over the distal SVC.    The cardiomediastinal silhouette is not enlarged.  There is no pleural effusion.  The trachea is midline.  The lungs are symmetrically expanded bilaterally with coarse interstitial attenuation, accentuated by habitus..  No large focal consolidation seen.  There is no pneumothorax.  The osseous structures are remarkable for degenerative change..                                    X-Rays:   Independently Interpreted Readings:   Other Readings:  No focal consolidation, no increased reticular opacities, no blunting of the costophrenic angles, no enlargement of the cardiac silhouette.  No acute cardiopulmonary  process noted.    Medications   acetaminophen tablet 1,000 mg (0 mg Oral Hold 12/9/24 1632)   HYDROmorphone injection 1 mg (1 mg Intravenous Given 12/9/24 1631)   prochlorperazine injection Soln 2.5 mg (2.5 mg Intravenous Given 12/9/24 1631)   HYDROmorphone injection 2 mg (2 mg Intravenous Given 12/9/24 1724)   HYDROmorphone injection 2 mg (2 mg Intravenous Given 12/9/24 2003)   prochlorperazine injection Soln 2.5 mg (2.5 mg Intravenous Given 12/9/24 2007)     Medical Decision Making  Patient is a 44-year-old afebrile, hemodynamically stable, in moderate distress female presenting due to abdominal pain, nausea, diarrhea.    DDX: Cholecystitis, sickle cell pain crisis, gastroenteritis pancreatitis    Lipase WNL.  Low suspicion for pancreatitis.  Patient afebrile, with no leukocytosis.  Bilirubin WNL.  No elevation in alk-phos or liver enzymes and general.  Low suspicion for cholecystitis as cause of symptoms.  Patient could have component of gastroenteritis.  Given that patient typically presents with similar symptoms for her sickle cell pain crises.  Suspect sickle cell disease likely underlying for etiology for patient's symptoms.  Chest x-ray without signs of focal consolidation or reticular opacities or enlargement of the cardiac silhouette.  Patient not hypoxic.  Low concern for acute chest syndrome.  Plan is to admit the patient for pain management after several rounds of IV medications have failed.  Discussed plan with patient who is in agreement.  Questions answered.    Amount and/or Complexity of Data Reviewed  Labs: ordered. Decision-making details documented in ED Course.  Radiology: ordered.    Risk  OTC drugs.  Prescription drug management.               ED Course as of 12/09/24 2244   Mon Dec 09, 2024   2110 BILIRUBIN TOTAL: 0.8 [MB]      ED Course User Index  [MB] Luis Manuel Jimenes MD                           Clinical Impression:  Final diagnoses:  [R10.9] Abdominal pain  [R05.9] Cough          ED  Disposition Condition    Observation                 Luis Manuel Jimenes MD  Resident  12/09/24 0685

## 2024-12-10 PROBLEM — Z86.711 HISTORY OF PULMONARY EMBOLISM: Chronic | Status: ACTIVE | Noted: 2020-06-08

## 2024-12-10 PROBLEM — E66.9 OBESITY (BMI 30-39.9): Chronic | Status: ACTIVE | Noted: 2017-04-25

## 2024-12-10 PROBLEM — E66.01 SEVERE OBESITY (BMI >= 40): Status: RESOLVED | Noted: 2020-06-08 | Resolved: 2024-12-10

## 2024-12-10 PROBLEM — M87.059 AVASCULAR NECROSIS OF FEMUR: Status: ACTIVE | Noted: 2024-12-10

## 2024-12-10 PROBLEM — R11.0 NAUSEA: Status: ACTIVE | Noted: 2024-08-22

## 2024-12-10 PROBLEM — D68.59 HYPERCOAGULABLE STATE: Status: ACTIVE | Noted: 2024-12-10

## 2024-12-10 PROBLEM — F11.90: Chronic | Status: ACTIVE | Noted: 2017-04-16

## 2024-12-10 PROBLEM — Z71.89 GOALS OF CARE, COUNSELING/DISCUSSION: Status: ACTIVE | Noted: 2024-12-10

## 2024-12-10 PROBLEM — F11.90: Status: ACTIVE | Noted: 2017-04-16

## 2024-12-10 PROBLEM — F19.20 DRUG DEPENDENCE: Status: ACTIVE | Noted: 2017-04-16

## 2024-12-10 PROBLEM — D57.1 SICKLE CELL ANEMIA: Chronic | Status: ACTIVE | Noted: 2020-01-09

## 2024-12-10 PROBLEM — F32.A ANXIETY AND DEPRESSION: Status: ACTIVE | Noted: 2018-03-20

## 2024-12-10 PROBLEM — R06.00 DYSPNEA: Status: ACTIVE | Noted: 2024-12-10

## 2024-12-10 LAB
ALBUMIN SERPL BCP-MCNC: 3.7 G/DL (ref 3.5–5.2)
ALP SERPL-CCNC: 101 U/L (ref 40–150)
ALT SERPL W/O P-5'-P-CCNC: 19 U/L (ref 10–44)
ANION GAP SERPL CALC-SCNC: 8 MMOL/L (ref 8–16)
AST SERPL-CCNC: 23 U/L (ref 10–40)
BILIRUB SERPL-MCNC: 0.8 MG/DL (ref 0.1–1)
BILIRUB UR QL STRIP: NEGATIVE
BUN SERPL-MCNC: 14 MG/DL (ref 6–20)
CALCIUM SERPL-MCNC: 9.7 MG/DL (ref 8.7–10.5)
CHLORIDE SERPL-SCNC: 106 MMOL/L (ref 95–110)
CLARITY UR REFRACT.AUTO: CLEAR
CO2 SERPL-SCNC: 23 MMOL/L (ref 23–29)
COLOR UR AUTO: YELLOW
CREAT SERPL-MCNC: 1.5 MG/DL (ref 0.5–1.4)
ERYTHROCYTE [DISTWIDTH] IN BLOOD BY AUTOMATED COUNT: 19.3 % (ref 11.5–14.5)
EST. GFR  (NO RACE VARIABLE): 43.3 ML/MIN/1.73 M^2
GLUCOSE SERPL-MCNC: 87 MG/DL (ref 70–110)
GLUCOSE UR QL STRIP: NEGATIVE
HCT VFR BLD AUTO: 29 % (ref 37–48.5)
HGB BLD-MCNC: 9.8 G/DL (ref 12–16)
HGB UR QL STRIP: NEGATIVE
KETONES UR QL STRIP: NEGATIVE
LEUKOCYTE ESTERASE UR QL STRIP: NEGATIVE
MAGNESIUM SERPL-MCNC: 2.1 MG/DL (ref 1.6–2.6)
MCH RBC QN AUTO: 21.4 PG (ref 27–31)
MCHC RBC AUTO-ENTMCNC: 33.8 G/DL (ref 32–36)
MCV RBC AUTO: 64 FL (ref 82–98)
NITRITE UR QL STRIP: NEGATIVE
OHS QRS DURATION: 92 MS
OHS QTC CALCULATION: 499 MS
PH UR STRIP: 5 [PH] (ref 5–8)
PHOSPHATE SERPL-MCNC: 3.8 MG/DL (ref 2.7–4.5)
PLATELET # BLD AUTO: 423 K/UL (ref 150–450)
PMV BLD AUTO: 9.2 FL (ref 9.2–12.9)
POTASSIUM SERPL-SCNC: 3.3 MMOL/L (ref 3.5–5.1)
PROT SERPL-MCNC: 7.8 G/DL (ref 6–8.4)
PROT UR QL STRIP: NEGATIVE
RBC # BLD AUTO: 4.57 M/UL (ref 4–5.4)
SODIUM SERPL-SCNC: 137 MMOL/L (ref 136–145)
SP GR UR STRIP: 1.01 (ref 1–1.03)
URN SPEC COLLECT METH UR: NORMAL
WBC # BLD AUTO: 7.72 K/UL (ref 3.9–12.7)

## 2024-12-10 PROCEDURE — 84100 ASSAY OF PHOSPHORUS: CPT | Performed by: FAMILY MEDICINE

## 2024-12-10 PROCEDURE — 96376 TX/PRO/DX INJ SAME DRUG ADON: CPT

## 2024-12-10 PROCEDURE — 25000003 PHARM REV CODE 250: Performed by: STUDENT IN AN ORGANIZED HEALTH CARE EDUCATION/TRAINING PROGRAM

## 2024-12-10 PROCEDURE — 80053 COMPREHEN METABOLIC PANEL: CPT | Performed by: FAMILY MEDICINE

## 2024-12-10 PROCEDURE — 99900035 HC TECH TIME PER 15 MIN (STAT)

## 2024-12-10 PROCEDURE — 81003 URINALYSIS AUTO W/O SCOPE: CPT | Performed by: EMERGENCY MEDICINE

## 2024-12-10 PROCEDURE — 63600175 PHARM REV CODE 636 W HCPCS: Performed by: FAMILY MEDICINE

## 2024-12-10 PROCEDURE — 25500020 PHARM REV CODE 255: Performed by: HOSPITALIST

## 2024-12-10 PROCEDURE — 83735 ASSAY OF MAGNESIUM: CPT | Performed by: FAMILY MEDICINE

## 2024-12-10 PROCEDURE — 25000003 PHARM REV CODE 250: Performed by: FAMILY MEDICINE

## 2024-12-10 PROCEDURE — 51701 INSERT BLADDER CATHETER: CPT

## 2024-12-10 PROCEDURE — 36415 COLL VENOUS BLD VENIPUNCTURE: CPT | Performed by: FAMILY MEDICINE

## 2024-12-10 PROCEDURE — 94761 N-INVAS EAR/PLS OXIMETRY MLT: CPT

## 2024-12-10 PROCEDURE — A9585 GADOBUTROL INJECTION: HCPCS | Performed by: HOSPITALIST

## 2024-12-10 PROCEDURE — 85027 COMPLETE CBC AUTOMATED: CPT | Performed by: FAMILY MEDICINE

## 2024-12-10 PROCEDURE — 51798 US URINE CAPACITY MEASURE: CPT

## 2024-12-10 PROCEDURE — 99285 EMERGENCY DEPT VISIT HI MDM: CPT | Mod: 25

## 2024-12-10 PROCEDURE — 94799 UNLISTED PULMONARY SVC/PX: CPT

## 2024-12-10 PROCEDURE — 11000001 HC ACUTE MED/SURG PRIVATE ROOM

## 2024-12-10 RX ORDER — LOPERAMIDE HYDROCHLORIDE 2 MG/1
2 CAPSULE ORAL 4 TIMES DAILY PRN
Status: DISCONTINUED | OUTPATIENT
Start: 2024-12-10 | End: 2024-12-18 | Stop reason: HOSPADM

## 2024-12-10 RX ORDER — DICYCLOMINE HYDROCHLORIDE 10 MG/1
10 CAPSULE ORAL 4 TIMES DAILY
Status: DISCONTINUED | OUTPATIENT
Start: 2024-12-10 | End: 2024-12-18 | Stop reason: HOSPADM

## 2024-12-10 RX ORDER — HYDROMORPHONE HYDROCHLORIDE 1 MG/ML
3 INJECTION, SOLUTION INTRAMUSCULAR; INTRAVENOUS; SUBCUTANEOUS EVERY 4 HOURS
Status: DISCONTINUED | OUTPATIENT
Start: 2024-12-10 | End: 2024-12-11

## 2024-12-10 RX ORDER — LIDOCAINE 50 MG/G
1 PATCH TOPICAL
Status: DISCONTINUED | OUTPATIENT
Start: 2024-12-10 | End: 2024-12-18 | Stop reason: HOSPADM

## 2024-12-10 RX ORDER — CALCIUM CARBONATE 200(500)MG
500 TABLET,CHEWABLE ORAL 3 TIMES DAILY PRN
Status: DISCONTINUED | OUTPATIENT
Start: 2024-12-10 | End: 2024-12-18 | Stop reason: HOSPADM

## 2024-12-10 RX ORDER — GADOBUTROL 604.72 MG/ML
10 INJECTION INTRAVENOUS
Status: COMPLETED | OUTPATIENT
Start: 2024-12-10 | End: 2024-12-10

## 2024-12-10 RX ADMIN — METHOCARBAMOL 1500 MG: 750 TABLET ORAL at 05:12

## 2024-12-10 RX ADMIN — MORPHINE SULFATE 30 MG: 30 TABLET, FILM COATED, EXTENDED RELEASE ORAL at 09:12

## 2024-12-10 RX ADMIN — PROMETHAZINE HYDROCHLORIDE 25 MG: 12.5 TABLET ORAL at 06:12

## 2024-12-10 RX ADMIN — DIPHENHYDRAMINE HYDROCHLORIDE 50 MG: 50 CAPSULE ORAL at 08:12

## 2024-12-10 RX ADMIN — FOLIC ACID 1 MG: 1 TABLET ORAL at 09:12

## 2024-12-10 RX ADMIN — HYDROXYUREA 1000 MG: 500 CAPSULE ORAL at 09:12

## 2024-12-10 RX ADMIN — GABAPENTIN 600 MG: 300 CAPSULE ORAL at 09:12

## 2024-12-10 RX ADMIN — HYDROMORPHONE HYDROCHLORIDE 3 MG: 1 INJECTION, SOLUTION INTRAMUSCULAR; INTRAVENOUS; SUBCUTANEOUS at 03:12

## 2024-12-10 RX ADMIN — FLUOXETINE HYDROCHLORIDE 40 MG: 20 CAPSULE ORAL at 12:12

## 2024-12-10 RX ADMIN — SUCRALFATE 1 G: 1 SUSPENSION ORAL at 06:12

## 2024-12-10 RX ADMIN — PANTOPRAZOLE SODIUM 40 MG: 40 TABLET, DELAYED RELEASE ORAL at 12:12

## 2024-12-10 RX ADMIN — OXYCODONE HYDROCHLORIDE 15 MG: 10 TABLET ORAL at 06:12

## 2024-12-10 RX ADMIN — METHOCARBAMOL 1500 MG: 750 TABLET ORAL at 09:12

## 2024-12-10 RX ADMIN — SODIUM CHLORIDE, POTASSIUM CHLORIDE, SODIUM LACTATE AND CALCIUM CHLORIDE: 600; 310; 30; 20 INJECTION, SOLUTION INTRAVENOUS at 10:12

## 2024-12-10 RX ADMIN — ACETAMINOPHEN 1000 MG: 500 TABLET ORAL at 06:12

## 2024-12-10 RX ADMIN — METHOCARBAMOL 1500 MG: 750 TABLET ORAL at 01:12

## 2024-12-10 RX ADMIN — APIXABAN 5 MG: 5 TABLET, FILM COATED ORAL at 08:12

## 2024-12-10 RX ADMIN — PANTOPRAZOLE SODIUM 40 MG: 40 TABLET, DELAYED RELEASE ORAL at 09:12

## 2024-12-10 RX ADMIN — MORPHINE SULFATE 30 MG: 30 TABLET, FILM COATED, EXTENDED RELEASE ORAL at 08:12

## 2024-12-10 RX ADMIN — DICYCLOMINE HYDROCHLORIDE 10 MG: 10 CAPSULE ORAL at 08:12

## 2024-12-10 RX ADMIN — HYDROMORPHONE HYDROCHLORIDE 3 MG: 1 INJECTION, SOLUTION INTRAMUSCULAR; INTRAVENOUS; SUBCUTANEOUS at 12:12

## 2024-12-10 RX ADMIN — SODIUM CHLORIDE, POTASSIUM CHLORIDE, SODIUM LACTATE AND CALCIUM CHLORIDE: 600; 310; 30; 20 INJECTION, SOLUTION INTRAVENOUS at 08:12

## 2024-12-10 RX ADMIN — DICYCLOMINE HYDROCHLORIDE 10 MG: 10 CAPSULE ORAL at 05:12

## 2024-12-10 RX ADMIN — FLUOXETINE HYDROCHLORIDE 40 MG: 20 CAPSULE ORAL at 09:12

## 2024-12-10 RX ADMIN — ACETAMINOPHEN 1000 MG: 500 TABLET ORAL at 01:12

## 2024-12-10 RX ADMIN — APIXABAN 5 MG: 5 TABLET, FILM COATED ORAL at 09:12

## 2024-12-10 RX ADMIN — METHOCARBAMOL 1500 MG: 750 TABLET ORAL at 08:12

## 2024-12-10 RX ADMIN — LIDOCAINE 1 PATCH: 50 PATCH CUTANEOUS at 12:12

## 2024-12-10 RX ADMIN — DICYCLOMINE HYDROCHLORIDE 10 MG: 10 CAPSULE ORAL at 01:12

## 2024-12-10 RX ADMIN — GABAPENTIN 600 MG: 300 CAPSULE ORAL at 08:12

## 2024-12-10 RX ADMIN — SUCRALFATE 1 G: 1 SUSPENSION ORAL at 12:12

## 2024-12-10 RX ADMIN — ACETAMINOPHEN 1000 MG: 500 TABLET ORAL at 08:12

## 2024-12-10 RX ADMIN — APIXABAN 5 MG: 5 TABLET, FILM COATED ORAL at 12:12

## 2024-12-10 RX ADMIN — HYDROMORPHONE HYDROCHLORIDE 3 MG: 1 INJECTION, SOLUTION INTRAMUSCULAR; INTRAVENOUS; SUBCUTANEOUS at 08:12

## 2024-12-10 RX ADMIN — GABAPENTIN 600 MG: 300 CAPSULE ORAL at 12:12

## 2024-12-10 RX ADMIN — SODIUM CHLORIDE, POTASSIUM CHLORIDE, SODIUM LACTATE AND CALCIUM CHLORIDE: 600; 310; 30; 20 INJECTION, SOLUTION INTRAVENOUS at 12:12

## 2024-12-10 RX ADMIN — GABAPENTIN 600 MG: 300 CAPSULE ORAL at 03:12

## 2024-12-10 RX ADMIN — DIPHENHYDRAMINE HYDROCHLORIDE 50 MG: 50 CAPSULE ORAL at 02:12

## 2024-12-10 RX ADMIN — POTASSIUM BICARBONATE 25 MEQ: 978 TABLET, EFFERVESCENT ORAL at 09:12

## 2024-12-10 RX ADMIN — MORPHINE SULFATE 30 MG: 30 TABLET, FILM COATED, EXTENDED RELEASE ORAL at 12:12

## 2024-12-10 RX ADMIN — OXYCODONE HYDROCHLORIDE 15 MG: 10 TABLET ORAL at 11:12

## 2024-12-10 RX ADMIN — OXYCODONE HYDROCHLORIDE 15 MG: 10 TABLET ORAL at 02:12

## 2024-12-10 RX ADMIN — HYDROXYZINE PAMOATE 50 MG: 25 CAPSULE ORAL at 12:12

## 2024-12-10 RX ADMIN — POTASSIUM BICARBONATE 25 MEQ: 978 TABLET, EFFERVESCENT ORAL at 01:12

## 2024-12-10 RX ADMIN — GADOBUTROL 10 ML: 604.72 INJECTION INTRAVENOUS at 01:12

## 2024-12-10 RX ADMIN — HYDROMORPHONE HYDROCHLORIDE 3 MG: 1 INJECTION, SOLUTION INTRAMUSCULAR; INTRAVENOUS; SUBCUTANEOUS at 05:12

## 2024-12-10 NOTE — SUBJECTIVE & OBJECTIVE
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    Asthma     Avascular necrosis of femur     Depression     History of pulmonary embolism     Hypertension     Morbid obesity     Opioid dependence 2017    Pneumonia due to Streptococcus pneumoniae 2017    Right lower lobe pneumonia 2017    Sepsis due to Streptococcus pneumoniae 2017    Sickle cell-beta thalassemia disease with pain     Trigeminal neuralgia        Past Surgical History:   Procedure Laterality Date     SECTION      ESOPHAGOGASTRODUODENOSCOPY N/A 2024    Procedure: EGD (ESOPHAGOGASTRODUODENOSCOPY);  Surgeon: Allen Marshall MD;  Location: [address]);  Service: Gastroenterology;  Laterality: N/A;    TONSILLECTOMY      TUBAL LIGATION         Review of patient's allergies indicates:   Allergen Reactions    Cat dander Anaphylaxis, Itching and Shortness Of Breath    Nsaids (non-steroidal anti-inflammatory drug) Itching and Anaphylaxis    Latex Rash       No current facility-administered medications on file prior to encounter.     Current Outpatient Medications on File Prior to Encounter   Medication Sig    acetaminophen (TYLENOL) 500 MG tablet Take 1,000 mg by mouth every 8 (eight) hours as needed for Pain.    albuterol (VENTOLIN HFA) 90 mcg/actuation inhaler Inhale 2 puffs into the lungs every 6 (six) hours as needed for Wheezing or Shortness of Breath. Rescue    amLODIPine (NORVASC) 10 MG tablet Take 1 tablet (10 mg total) by mouth once daily.    apixaban (ELIQUIS) 5 mg Tab Take 1 tablet (5 mg total) by mouth 2 (two) times daily.    ascorbic acid (VITAMIN C ORAL) Take 1 capsule by mouth once daily.    dicyclomine (BENTYL) 10 MG capsule Take 1 capsule (10 mg total) by mouth 4 (four) times daily as needed (stomach cramps).    FLUoxetine 40 MG capsule Take 1 capsule (40 mg total) by mouth once daily.     fluticasone propionate (FLONASE) 50 mcg/actuation nasal spray INSTILL 1 SPRAY INTO EACH NOSTRIL EVERY DAY    folic acid (FOLVITE) 1 MG tablet Take 1 tablet (1 mg total) by mouth once daily.    gabapentin (NEURONTIN) 300 MG capsule Take 2 capsules (600 mg total) by mouth 3 (three) times daily.    hydroxyurea (HYDREA) 500 mg Cap Take 2 capsules (1,000 mg total) by mouth once daily.    hydrOXYzine pamoate (VISTARIL) 25 MG Cap Take 1 capsule (25 mg total) by mouth every 4 (four) hours as needed (Anxiety).    morphine (MS CONTIN) 30 MG 12 hr tablet Take 1 tablet (30 mg total) by mouth every 12 (twelve) hours. for 21 days    oxyCODONE (ROXICODONE) 15 MG Tab Take 1 tablet (15 mg total) by mouth every 4 (four) hours as needed for Pain.    pantoprazole (PROTONIX) 40 MG tablet Take 1 tablet (40 mg total) by mouth once daily.    promethazine (PHENERGAN) 12.5 MG Tab Take 2 tablets (25 mg total) by mouth 4 (four) times daily.    senna-docusate 8.6-50 mg (PERICOLACE) 8.6-50 mg per tablet Take 1 tablet by mouth daily as needed for Constipation.    tiZANidine (ZANAFLEX) 4 MG tablet Take 1 tablet (4 mg total) by mouth every 8 (eight) hours as needed (Muscle pain).     Family History       Problem Relation (Age of Onset)    Diabetes Mother    Heart disease Mother, Father          Tobacco Use    Smoking status: Never    Smokeless tobacco: Never   Substance and Sexual Activity    Alcohol use: No    Drug use: Yes     Types: Marijuana     Comment: periodically     Sexual activity: Not Currently     Birth control/protection: See Surgical Hx     Review of Systems: See HPI  Objective:     Vital Signs (Most Recent):  Temp: 98.2 °F (36.8 °C) (12/10/24 0232)  Pulse: 80 (12/10/24 0353)  Resp: 18 (12/10/24 0614)  BP: 108/73 (12/10/24 0232)  SpO2: 100 % (12/10/24 0232) Vital Signs (24h Range):  Temp:  [97.9 °F (36.6 °C)-98.7 °F (37.1 °C)] 98.2 °F (36.8 °C)  Pulse:  [77-82] 80  Resp:  [16-20] 18  SpO2:  [99 %-100 %] 100 %  BP: (108-128)/(73-84)  108/73     Weight: 97.2 kg (214 lb 4.6 oz)  Body mass index is 39.19 kg/m².     Physical Exam  Vitals reviewed.   Constitutional:       Appearance: She is ill-appearing. She is not toxic-appearing.   HENT:      Mouth/Throat:      Mouth: Mucous membranes are dry.   Eyes:      General: No scleral icterus.  Cardiovascular:      Rate and Rhythm: Normal rate and regular rhythm.      Heart sounds: Normal heart sounds. No murmur heard.  Pulmonary:      Effort: Pulmonary effort is normal.      Breath sounds: Normal breath sounds.   Abdominal:      General: Bowel sounds are normal. There is no distension.      Palpations: Abdomen is soft.      Tenderness: There is abdominal tenderness. There is no guarding or rebound.   Musculoskeletal:      Right lower leg: No edema.      Left lower leg: No edema.   Skin:     General: Skin is warm and dry.      Coloration: Skin is pale.      Comments: Right lateral lower leg scar, healed   Neurological:      General: No focal deficit present.      Mental Status: She is alert and oriented to person, place, and time.   Psychiatric:         Mood and Affect: Mood normal.         Behavior: Behavior normal.       Significant Labs: All pertinent labs within the past 24 hours have been reviewed.    Significant Imaging: I have reviewed all pertinent imaging results/findings within the past 24 hours.    High Risk Conditions: Patient is currently receiving parenteral controlled substances: Dilaudid

## 2024-12-10 NOTE — HOSPITAL COURSE
She reported severe abdominal pain with nausea and diarrhea has happened with prior pain crises as well. She had infectious GI workup in November that showed chronic gastritis. She had no diarrhea for 48 hours after admission. Stool studies were cancelled. She was put on IV hydromorphone 3 mg every 3 hours. She was given multimodal analgesic regimen with diphenhydramine, sucralfate, and additional medications as needed for supportive care. Loyd catheter was placed for urinary retention. She became hypoxic. IV fluids were held. Ultrasound mesenteric ischemia study showed no evidence of hemodynamically significant stenosis of either the celiac or superior mesenteric arteries, but the inferior mesenteric artery was not well visualized. Gastroenterology was consulted. They recommended increasing pantoprazole to twice daily, adding famotidine nightly, and checking H pylori serology. H pylori IgG was negative. Abdominal ultrasound and HIDA scan showed no gallbladder disease. She reported bloating. Her opioid use can cause gastroparesis. She was put on metoclopramide. She felt some improvement. Her abdominal symptoms are likely due to known gastritis and opioid-induced gastroparesis. Unfortunately, she requires chronic opioids. She had run out prior to admission so was given a refill of 30 tablets. She also had relief of anxiety from lorazepam and reported previously being prescribed alprazolam, clonazepam, and lorazepam at different times. She was prescribed a few lorazepam. She was prescribed sucralfate and metoclopramide, as well as the increased pantoprazole.

## 2024-12-10 NOTE — CARE UPDATE
I have reviewed the chart of Nazanin Malone who is hospitalized for the following:    Active Hospital Problems    Diagnosis    *Vaso-occlusive pain due to sickle cell disease    Avascular necrosis of femur    Goals of care, counseling/discussion    Hypercoagulable state     Hist pe on eliquis      Dyspnea    Diarrhea    Abdominal pain    Nausea    Chronic anticoagulation    History of pulmonary embolism    Hypokalemia     Replaced  Monitor with daily cmp      Sickle cell anemia    Anxiety and depression     -continue home dose of fluoxetine      Obesity (BMI 30-39.9)    Drug dependence    Hypertension        Santa Reyna NP  [facility]

## 2024-12-10 NOTE — ED NOTES
Assumed care of patient and have received report from nurse.     Nazanin Malone, a 44 y.o. female presents to the ED w/ complaint of abdominal pain.     Triage note:  Chief Complaint   Patient presents with    Abdominal Pain     Per EMS pt from home, pt c/o upper abdominal pain radiating to chest, nausea, diarrhea x 3 days. GCS 15.     Review of patient's allergies indicates:   Allergen Reactions    Cat dander Anaphylaxis, Itching and Shortness Of Breath    Nsaids (non-steroidal anti-inflammatory drug) Itching and Anaphylaxis    Latex Rash     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    Asthma     Depression     Hypertension     Morbid obesity     Opioid dependence 4/16/2017    Pneumonia due to Streptococcus pneumoniae 4/25/2017    Right lower lobe pneumonia 4/29/2017    Sepsis due to Streptococcus pneumoniae 4/25/2017    Sickle cell-beta thalassemia disease with pain     Trigeminal neuralgia        Pt identifiers Nazanin Malonechecked and correct  LOC: The patient is awake, alert, aware of environment with an appropriate affect. Oriented x3, speaking appropriately  APPEARANCE: Pt resting comfortably, in no acute distress, pt is clean and well groomed, clothing properly fastened  SKIN: Skin warm, dry and intact, normal skin turgor, moist mucus membranes  RESPIRATORY: Airway is open and patent, respirations are spontaneous, even and unlabored, normal effort and rate  CARDIAC: Normal rate and rhythm, no peripheral edema noted, capillary refill < 3 seconds, bilateral radial pulses 2+  ABDOMEN: Soft, nontender, nondistended. Bowel sounds present +abdominal pain, N/V, diarrhea   NEUROLOGIC: PERRL, facial expression is symmetrical, patient moving all extremities spontaneously, normal sensation in all extremities when touched with a finger.  Follows all commands appropriately  MUSCULOSKELETAL: No obvious  deformities. +generalized weakness

## 2024-12-10 NOTE — ASSESSMENT & PLAN NOTE
Abdominal pain  Reflux  Stool studies ordered  Cont IV fluid  If C diff negative, consider antidiarrheals  Trial of Carafate, cont home Protonix.  Maalox p.r.n..

## 2024-12-10 NOTE — PLAN OF CARE
[facility])  Initial Discharge Assessment       Primary Care Provider: Pee Montgomery MD    Admission Diagnosis: Cough [R05.9]  Abdominal pain [R10.9]  Chest pain [R07.9]    Admission Date: 12/9/2024  Expected Discharge Date: 12/13/2024         Payor: [employer] MANAGED MEDICARE / Plan: [employer] MEDICARE PLAN HMO / Product Type: Medicare Advantage /     Extended Emergency Contact Information  Primary Emergency Contact: Jack Malone  Mobile Phone: [phone]  Relation:               [facility]  [address] [address]  Phone: [phone] Fax: [phone]    [facility]  [address]  [facility] [address]  Phone: [phone] Fax: [phone]      Initial Assessment (most recent)       Adult Discharge Assessment - 12/10/24 1127          Discharge Assessment    Assessment Type Discharge Planning Assessment (P)                      Discharge Assessment      Assessment Type Discharge Planning Assessment      Confirmed/corrected address, phone number and insurance Yes      Confirmed Demographics Correct on Facesheet      Source of Information patient      When was your last doctors appointment? 10/08/24      Communicated ALYSE with patient/caregiver Yes      Reason For Admission Sickle cell crisis      People in Home child(jayce), adult;grandchild(jayce);parent(s)      Do you expect to return to your current living situation? Yes      Do you have help at home or someone to help you manage your care at home? Yes      Who are your caregiver(s) and their phone number(s)? Parents, family members      Prior to hospitilization cognitive status: Alert/Oriented      Current cognitive status: Alert/Oriented      Walking or Climbing Stairs Difficulty no      Dressing/Bathing Difficulty no      Home Accessibility stairs to enter home      Number of Stairs, Main Entrance one      Equipment Currently Used at Home none      Patient currently  being followed by outpatient case management? No      Do you currently have service(s) that help you manage your care at home? No      Do you take prescription medications? Yes      Do you have prescription coverage? Yes      Coverage [employer] Managed Medicare/ Medicaid      Do you have any problems affording any of your prescribed medications? No      Is the patient taking medications as prescribed? yes      Who is going to help you get home at discharge? Parents, family members      How do you get to doctors appointments? car, drives self      Are you on dialysis? No      Do you take coumadin? No      Discharge Plan A Home with family      Discharge Plan B Home;Home with family      DME Needed Upon Discharge  none      Discharge Plan discussed with: Parent(s)      Transition of Care Barriers None (P)             Physical Activity     On average, how many minutes do you engage in exercise at this level? 0 min (P)             Financial Resource Strain     How hard is it for you to pay for the very basics like food, housing, medical care, and heating? Not hard at all (P)             Housing Stability     In the last 12 months, was there a time when you were not able to pay the mortgage or rent on time? No (P)       At any time in the past 12 months, were you homeless or living in a shelter (including now)? No (P)             Transportation Needs     Has the lack of transportation kept you from medical appointments, meetings, work or from getting things needed for daily living? No (P)             Food Insecurity     Within the past 12 months, you worried that your food would run out before you got the money to buy more. Never true (P)       Within the past 12 months, the food you bought just didn't last and you didn't have money to get more. Never true (P)             Stress     Do you feel stress - tense, restless, nervous, or anxious, or unable to sleep at night because your mind is troubled all the time - these days?  Not at all (P)             Social Isolation     How often do you feel lonely or isolated from those around you?  Rarely (P)             Alcohol Use     Q1: How often do you have a drink containing alcohol? Never (P)       Q2: How many drinks containing alcohol do you have on a typical day when you are drinking? Patient does not drink (P)       Q3: How often do you have six or more drinks on one occasion? Never (P)             Utilities     In the past 12 months has the electric, gas, oil, or water company threatened to shut off services in your home? No (P)             Health Literacy     How often do you need to have someone help you when you read instructions, pamphlets, or other written material from your doctor or pharmacy? Never (P)             OTHER     Name(s) of People in Home Parent/family (P)                          Patient resides in a [facility] with 2 steps.  She lives with her parents and children.  Patient drives herself to appts.  Patient is independent with ADL's and ambulation and has no DME or HH needs.  Patient is not on HD or Coumadin.  Patient will discharge home with family.  Her family will provide support and transportation as needed.      Discharge Plan A and Plan B have been determined by review of patient's clinical status, future medical and therapeutic needs, and coverage/benefits for post-acute care in coordination with multidisciplinary team members.    Radha Castillo, FAN  [facility]  [phone]

## 2024-12-10 NOTE — SUBJECTIVE & OBJECTIVE
Interval History: Admitted overnight for sickle cell pain crisis. Reports continued severe abdominal pain, as well as R hip/leg pain. No episodes of diarrhea since arrival. No N/V, but reports severe abdominal cramping and GI upset. Pt reports that this has happened with prior pain crises. Had GI infx w/u last month that was negative. Given recent negative w/u, seemingly assoc pattern w/ pain crises, nml WBC, and no further episodes of diarrhea (at least since admission), will d/c C.diff and infx work-up for now. Pt asks if dilaudid can be increased to Q3H. Given IV dilaudid currently scheduled Q4H, will defer increasing scheduled IV narcotic, but rather increased PRN PO oxycodone to 15mg Q3H. Pt also requests benadryl be changed to IV as she reports significant itching. Avoiding IV pushes of benadryl w/ narcotics. Could consider IVPB, however given delay in administration as PB has to come up from pharmacy, will keep PO benadryl 50mg Q4H PRN w/ addn'l PRN hydroxyzine available. Scheduled Bentyl also added for abdominal pain/cramping. Endorses continued SOB & chest pain (stable from prior). Remains HDS on RA.     Review of Systems   Constitutional:  Positive for fatigue. Negative for chills, diaphoresis and fever.   HENT:  Negative for congestion, rhinorrhea, sore throat and trouble swallowing.    Eyes:  Negative for photophobia and visual disturbance.   Respiratory:  Positive for chest tightness and shortness of breath. Negative for cough and wheezing.    Cardiovascular:  Positive for chest pain. Negative for palpitations and leg swelling.   Gastrointestinal:  Positive for abdominal pain. Negative for blood in stool, constipation, diarrhea, nausea and vomiting.   Genitourinary:  Negative for difficulty urinating, dysuria and hematuria.   Musculoskeletal:  Positive for arthralgias. Negative for joint swelling and neck pain.   Skin:  Negative for rash and wound.   Neurological:  Negative for dizziness,  light-headedness and headaches.   Psychiatric/Behavioral:  Negative for agitation, confusion and sleep disturbance.        Objective:     Vital Signs (Most Recent):  Temp: 97.7 °F (36.5 °C) (12/10/24 1111)  Pulse: 87 (12/10/24 1544)  Resp: 17 (12/10/24 1216)  BP: (!) 141/72 (12/10/24 1111)  SpO2: (!) 94 % (12/10/24 1541) Vital Signs (24h Range):  Temp:  [97.7 °F (36.5 °C)-98.7 °F (37.1 °C)] 97.7 °F (36.5 °C)  Pulse:  [78-87] 87  Resp:  [16-20] 17  SpO2:  [94 %-100 %] 94 %  BP: (108-141)/(72-84) 141/72     Weight: 97.2 kg (214 lb 4.6 oz)  Body mass index is 39.19 kg/m².  No intake or output data in the 24 hours ending 12/10/24 1552      Physical Exam  Vitals reviewed.   Constitutional:       Appearance: She is obese. She is not toxic-appearing or diaphoretic.   HENT:      Head: Normocephalic and atraumatic.      Mouth/Throat:      Mouth: Mucous membranes are moist.   Eyes:      Conjunctiva/sclera: Conjunctivae normal.   Cardiovascular:      Rate and Rhythm: Normal rate and regular rhythm.      Heart sounds: Normal heart sounds. No murmur heard.  Pulmonary:      Effort: Pulmonary effort is normal. No respiratory distress.      Breath sounds: Normal breath sounds. No wheezing, rhonchi or rales.   Chest:      Chest wall: Tenderness present.   Abdominal:      General: Bowel sounds are normal. There is no distension.      Palpations: Abdomen is soft.      Tenderness: There is abdominal tenderness (R>>L). There is no guarding or rebound.   Musculoskeletal:      Right lower leg: No edema.      Left lower leg: No edema.   Skin:     General: Skin is warm and dry.      Comments: Right lateral lower leg scar, healed   Neurological:      General: No focal deficit present.      Mental Status: She is alert and oriented to person, place, and time. Mental status is at baseline.   Psychiatric:         Mood and Affect: Mood normal.         Behavior: Behavior normal.             Significant Labs: All pertinent labs within the past 24  hours have been reviewed.    Significant Imaging: I have reviewed all pertinent imaging results/findings within the past 24 hours.

## 2024-12-10 NOTE — ASSESSMENT & PLAN NOTE
Advance Care Planning     Code Status  I engaged the the patient in a voluntary conversation about the patient's preferences for care  at the very end of life. The patient wishes to have a natural, peaceful death.  Along those lines, the patient does not wish to have CPR or other invasive treatments performed when her heart and/or breathing stops. I communicated to the patient that a DNR order would be placed in her medical record to reflect this preference.    A total of 10 min was spent on advance care planning, goals of care discussion, emotional support, formulating and communicating prognosis and exploring burden/benefit of various approaches of treatment. This discussion occurred on a fully voluntary basis with the verbal consent of the patient and/or family.

## 2024-12-10 NOTE — ASSESSMENT & PLAN NOTE
Avascular necrosis of femur  No cough, fever, consolidation.  On room air.  No concern for acute chest at this time.  Cont home folic acid and hydroxyurea  Multimodal pain management with scheduled IV Dilaudid, oxy for breakthrough, scheduled MS Contin, gabapentin, Robaxin and Tylenol.  Cont IV fluids  Follow up stool studies, if negative for C diff consider antidiarrheals.  Avascular necrosis of femurs noted on prior imaging.  MRI bilateral femurs ordered.

## 2024-12-10 NOTE — H&P
[facility] - Emergency Dept  [facility]  History & Physical    Patient Name: Nazanin Malone  MRN: [id number]  Patient Class: OP- Observation  Admission Date: 12/9/2024  Attending Physician: Arabella Herrera MD   Primary Care Provider: Pee Montgomery MD         Patient information was obtained from patient, past medical records, and ER records.     Subjective:     Principal Problem:Vaso-occlusive pain due to sickle cell disease    Chief Complaint   Patient presents with    Abdominal Pain     Per EMS pt from home, pt c/o upper abdominal pain radiating to chest, nausea, diarrhea x 3 days. GCS 15.        HPI: 45 yo F PMH as below, notable sickle cell anemia with recurrent vaso-occlusive pain episodes requiring hospital admissions, continuous opioid use, avascular necrosis of the femurs, and pulmonary embolism on Eliquis who presents to the ED with chief complaint of abdominal and lower extremity pain consistent with vaso-occlusive pain episode.  Pt was recently admitted to this facility from 11/29-12/3 for vaso-occlusive pain episode.  Pt says that she felt good for approx 3 days.  Pt reports nonbloody diarrhea that began 3 days prior to admission that she attributed to gastroenteritis.  She reports associated nausea with no vomiting, no sick contacts.  Onset of diarrhea did not seem to be related to any specific food she ate that day.  Since then, pt has had significantly less PO intake and noticed increasing lower extremity and hip pain as well as abdominal pain radiating into the chest that is typical of prior vaso-occlusive pain episode.  Pt ran out of pain medications one day prior to admission.  She has established with Hematology but had not yet been prescribed hydroxyurea.  Pt does take this when admitted and has not had significant side-effects from it.  Currently, pt says that abdominal, chest, hips, and lower extremity pain improved with IV Dilaudid.  She reports associated SOB when the pain  is severe.  She reports intermittent headache that resolved with pain medications in the hospital.  She reports ongoing diarrhea 2-3 episodes per day that are described as loose and not watery.  She denies urinary symptoms, blood in urine and stool and denies lower extremity edema.    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    Asthma     Avascular necrosis of femur     Depression     History of pulmonary embolism     Hypertension     Morbid obesity     Opioid dependence 2017    Pneumonia due to Streptococcus pneumoniae 2017    Right lower lobe pneumonia 2017    Sepsis due to Streptococcus pneumoniae 2017    Sickle cell-beta thalassemia disease with pain     Trigeminal neuralgia        Past Surgical History:   Procedure Laterality Date     SECTION      ESOPHAGOGASTRODUODENOSCOPY N/A 2024    Procedure: EGD (ESOPHAGOGASTRODUODENOSCOPY);  Surgeon: Allen Marshall MD;  Location: [address]);  Service: Gastroenterology;  Laterality: N/A;    TONSILLECTOMY      TUBAL LIGATION         Review of patient's allergies indicates:   Allergen Reactions    Cat dander Anaphylaxis, Itching and Shortness Of Breath    Nsaids (non-steroidal anti-inflammatory drug) Itching and Anaphylaxis    Latex Rash       No current facility-administered medications on file prior to encounter.     Current Outpatient Medications on File Prior to Encounter   Medication Sig    acetaminophen (TYLENOL) 500 MG tablet Take 1,000 mg by mouth every 8 (eight) hours as needed for Pain.    albuterol (VENTOLIN HFA) 90 mcg/actuation inhaler Inhale 2 puffs into the lungs every 6 (six) hours as needed for Wheezing or Shortness of Breath. Rescue    amLODIPine (NORVASC) 10 MG tablet Take 1 tablet (10 mg total) by mouth once daily.    apixaban (ELIQUIS) 5 mg Tab Take 1 tablet (5 mg total) by mouth 2 (two) times daily.     ascorbic acid (VITAMIN C ORAL) Take 1 capsule by mouth once daily.    dicyclomine (BENTYL) 10 MG capsule Take 1 capsule (10 mg total) by mouth 4 (four) times daily as needed (stomach cramps).    FLUoxetine 40 MG capsule Take 1 capsule (40 mg total) by mouth once daily.    fluticasone propionate (FLONASE) 50 mcg/actuation nasal spray INSTILL 1 SPRAY INTO EACH NOSTRIL EVERY DAY    folic acid (FOLVITE) 1 MG tablet Take 1 tablet (1 mg total) by mouth once daily.    gabapentin (NEURONTIN) 300 MG capsule Take 2 capsules (600 mg total) by mouth 3 (three) times daily.    hydroxyurea (HYDREA) 500 mg Cap Take 2 capsules (1,000 mg total) by mouth once daily.    hydrOXYzine pamoate (VISTARIL) 25 MG Cap Take 1 capsule (25 mg total) by mouth every 4 (four) hours as needed (Anxiety).    morphine (MS CONTIN) 30 MG 12 hr tablet Take 1 tablet (30 mg total) by mouth every 12 (twelve) hours. for 21 days    oxyCODONE (ROXICODONE) 15 MG Tab Take 1 tablet (15 mg total) by mouth every 4 (four) hours as needed for Pain.    pantoprazole (PROTONIX) 40 MG tablet Take 1 tablet (40 mg total) by mouth once daily.    promethazine (PHENERGAN) 12.5 MG Tab Take 2 tablets (25 mg total) by mouth 4 (four) times daily.    senna-docusate 8.6-50 mg (PERICOLACE) 8.6-50 mg per tablet Take 1 tablet by mouth daily as needed for Constipation.    tiZANidine (ZANAFLEX) 4 MG tablet Take 1 tablet (4 mg total) by mouth every 8 (eight) hours as needed (Muscle pain).     Family History       Problem Relation (Age of Onset)    Diabetes Mother    Heart disease Mother, Father          Tobacco Use    Smoking status: Never    Smokeless tobacco: Never   Substance and Sexual Activity    Alcohol use: No    Drug use: Yes     Types: Marijuana     Comment: periodically     Sexual activity: Not Currently     Birth control/protection: See Surgical Hx     Review of Systems: See HPI  Objective:     Vital Signs (Most Recent):  Temp: 98.2 °F (36.8 °C) (12/10/24 0232)  Pulse: 80  (12/10/24 0353)  Resp: 18 (12/10/24 0614)  BP: 108/73 (12/10/24 0232)  SpO2: 100 % (12/10/24 0232) Vital Signs (24h Range):  Temp:  [97.9 °F (36.6 °C)-98.7 °F (37.1 °C)] 98.2 °F (36.8 °C)  Pulse:  [77-82] 80  Resp:  [16-20] 18  SpO2:  [99 %-100 %] 100 %  BP: (108-128)/(73-84) 108/73     Weight: 97.2 kg (214 lb 4.6 oz)  Body mass index is 39.19 kg/m².     Physical Exam  Vitals reviewed.   Constitutional:       Appearance: She is ill-appearing. She is not toxic-appearing.   HENT:      Mouth/Throat:      Mouth: Mucous membranes are dry.   Eyes:      General: No scleral icterus.  Cardiovascular:      Rate and Rhythm: Normal rate and regular rhythm.      Heart sounds: Normal heart sounds. No murmur heard.  Pulmonary:      Effort: Pulmonary effort is normal.      Breath sounds: Normal breath sounds.   Abdominal:      General: Bowel sounds are normal. There is no distension.      Palpations: Abdomen is soft.      Tenderness: There is abdominal tenderness. There is no guarding or rebound.   Musculoskeletal:      Right lower leg: No edema.      Left lower leg: No edema.   Skin:     General: Skin is warm and dry.      Coloration: Skin is pale.      Comments: Right lateral lower leg scar, healed   Neurological:      General: No focal deficit present.      Mental Status: She is alert and oriented to person, place, and time.   Psychiatric:         Mood and Affect: Mood normal.         Behavior: Behavior normal.       Significant Labs: All pertinent labs within the past 24 hours have been reviewed.    Significant Imaging: I have reviewed all pertinent imaging results/findings within the past 24 hours.    High Risk Conditions: Patient is currently receiving parenteral controlled substances: Dilaudid     Assessment/Plan:     * Vaso-occlusive pain due to sickle cell disease  Avascular necrosis of femur  No cough, fever, consolidation.  On room air.  No concern for acute chest at this time.  Cont home folic acid and  hydroxyurea  Multimodal pain management with scheduled IV Dilaudid, oxy for breakthrough, scheduled MS Contin, gabapentin, Robaxin and Tylenol.  Cont IV fluids  Follow up stool studies, if negative for C diff consider antidiarrheals.  Avascular necrosis of femurs noted on prior imaging.  MRI bilateral femurs ordered.    Diarrhea  Abdominal pain  Reflux  Stool studies ordered  Cont IV fluid  If C diff negative, consider antidiarrheals  Trial of Carafate, cont home Protonix.  Maalox p.r.n..    Goals of care, counseling/discussion  Advance Care Planning    Code Status  I engaged the the patient in a voluntary conversation about the patient's preferences for care  at the very end of life. The patient wishes to have a natural, peaceful death.  Along those lines, the patient does not wish to have CPR or other invasive treatments performed when her heart and/or breathing stops. I communicated to the patient that a DNR order would be placed in her medical record to reflect this preference.    A total of 10 min was spent on advance care planning, goals of care discussion, emotional support, formulating and communicating prognosis and exploring burden/benefit of various approaches of treatment. This discussion occurred on a fully voluntary basis with the verbal consent of the patient and/or family.     History of pulmonary embolism  Compliant with home Eliquis, cont    Anxiety and depression  Cont home Prozac and p.r.n. Vistaril    Obesity (BMI 30-39.9)  Body mass index is 39.19 kg/m². Morbid obesity complicates all aspects of disease management from diagnostic modalities to treatment.     Continuous use of opioids  Ran out of pain meds day prior to admission.  Will need outpatient physicians to prescribe long-term pain meds.  Consider pain management.      VTE Risk Mitigation (From admission, onward)           Ordered     apixaban tablet 5 mg  2 times daily         12/09/24 2443     IP VTE HIGH RISK PATIENT  Once          12/09/24 2321     Place sequential compression device  Until discontinued         12/09/24 2321                       On 12/09/2024, patient should be placed in hospital observation services under my care.             Chase Gasca III, MD  Department of Hospital Medicine  [facility] - Emergency Dept

## 2024-12-10 NOTE — HPI
Nazanin Malone is a 44 year old Black woman with sickle cell type S beta thalassemia, recurrent vaso-occlusive pain crises, chronic opioid use, avascular necrosis of the left femur, hypertension, asthma, trigeminal neuralgia, history of pulmonary embolism (anticoagulated on apixaban), history of rhabdomyolysis status post fasciotomy in , history of chronic gastritis and duodenitis on 2024, history of  section, history of tubal ligation, history of tonsillectomy. She lives in [facility]. She works at [employer].   She was hospitalized at [facility] from 2024 to 12/3/2024 for vaso-occlusive pain. She returned to [facility] Emergency Department on 2024. She reported feeling good for approximately 3 days. She started having non-bloody diarrhea, loose and occurring 2 to 3 times a day, 3 days prior to presentation that she attributed to gastroenteritis, with associated nausea. She denied sick contacts. Since then, she had less oral intake and noticed increasing lower extremity and hip pain as well as abdominal pain radiating into her chest, typical of vaso-occlusive pain crisis. She ran out of pain medications a day prior to presentation. She has not yet been prescribed hydroxyurea. She takes it when she is admitted to the hospital and has not had significant side effects from it. She had associated shortness of breath when pain was severe and intermittent headache that resolved when she was given pain medications in the emergency department. She was given IV hydromorphone. She was admitted to Hospital Medicine Team [facility]

## 2024-12-10 NOTE — ASSESSMENT & PLAN NOTE
Body mass index is 39.19 kg/m². Morbid obesity complicates all aspects of disease management from diagnostic modalities to treatment.

## 2024-12-10 NOTE — ASSESSMENT & PLAN NOTE
Abdominal pain  Reflux  Reports similar sx with prior pain crises. Infectious GI w/u in Nov '24 negative.  Defer repeat stool studies for now  Cont IVF  Bentyl QID  Continue home PPI  Trial carafate  PRN maalox, simethicone, antiemetics, imodium, etc

## 2024-12-10 NOTE — PROGRESS NOTES
[facility] - Neurosurgery ([facility])  [facility] Medicine  Progress Note    Patient Name: Nazanin Malone  MRN: [id number]  Patient Class: IP- Inpatient   Admission Date: 12/9/2024  Length of Stay: 0 days  Attending Physician: Arabella Herrera MD  Primary Care Provider: Pee Montgomery MD        Subjective     Principal Problem:Vaso-occlusive pain due to sickle cell disease    HPI:  45 yo F PMH as below, notable sickle cell anemia with recurrent vaso-occlusive pain episodes requiring hospital admissions, continuous opioid use, avascular necrosis of the femurs, and pulmonary embolism on Eliquis who presents to the ED with chief complaint of abdominal and lower extremity pain consistent with vaso-occlusive pain episode.  Pt was recently admitted to this facility from 11/29-12/3 for vaso-occlusive pain episode.  Pt says that she felt good for approx 3 days.  Pt reports nonbloody diarrhea that began 3 days prior to admission that she attributed to gastroenteritis.  She reports associated nausea with no vomiting, no sick contacts.  Onset of diarrhea did not seem to be related to any specific food she ate that day.  Since then, pt has had significantly less PO intake and noticed increasing lower extremity and hip pain as well as abdominal pain radiating into the chest that is typical of prior vaso-occlusive pain episode.  Pt ran out of pain medications one day prior to admission.  She has established with Hematology but had not yet been prescribed hydroxyurea.  Pt does take this when admitted and has not had significant side-effects from it.  Currently, pt says that abdominal, chest, hips, and lower extremity pain improved with IV Dilaudid.  She reports associated SOB when the pain is severe.  She reports intermittent headache that resolved with pain medications in the hospital.  She reports ongoing diarrhea 2-3 episodes per day that are described as loose and not watery.  She denies urinary symptoms, blood in urine  and stool and denies lower extremity edema.    Overview/Hospital Course:  As below.     Interval History: Admitted overnight for sickle cell pain crisis. Reports continued severe abdominal pain, as well as R hip/leg pain. No episodes of diarrhea since arrival. No N/V, but reports severe abdominal cramping and GI upset. Pt reports that this has happened with prior pain crises. Had GI infx w/u last month that was negative. Given recent negative w/u, seemingly assoc pattern w/ pain crises, nml WBC, and no further episodes of diarrhea (at least since admission), will d/c C.diff and infx work-up for now. Pt asks if dilaudid can be increased to Q3H. Given IV dilaudid currently scheduled Q4H, will defer increasing scheduled IV narcotic, but rather increased PRN PO oxycodone to 15mg Q3H. Pt also requests benadryl be changed to IV as she reports significant itching. Avoiding IV pushes of benadryl w/ narcotics. Could consider IVPB, however given delay in administration as PB has to come up from pharmacy, will keep PO benadryl 50mg Q4H PRN w/ addn'l PRN hydroxyzine available. Scheduled Bentyl also added for abdominal pain/cramping. Endorses continued SOB & chest pain (stable from prior). Remains HDS on RA.     Review of Systems   Constitutional:  Positive for fatigue. Negative for chills, diaphoresis and fever.   HENT:  Negative for congestion, rhinorrhea, sore throat and trouble swallowing.    Eyes:  Negative for photophobia and visual disturbance.   Respiratory:  Positive for chest tightness and shortness of breath. Negative for cough and wheezing.    Cardiovascular:  Positive for chest pain. Negative for palpitations and leg swelling.   Gastrointestinal:  Positive for abdominal pain. Negative for blood in stool, constipation, diarrhea, nausea and vomiting.   Genitourinary:  Negative for difficulty urinating, dysuria and hematuria.   Musculoskeletal:  Positive for arthralgias. Negative for joint swelling and neck pain.    Skin:  Negative for rash and wound.   Neurological:  Negative for dizziness, light-headedness and headaches.   Psychiatric/Behavioral:  Negative for agitation, confusion and sleep disturbance.        Objective:     Vital Signs (Most Recent):  Temp: 97.7 °F (36.5 °C) (12/10/24 1111)  Pulse: 87 (12/10/24 1544)  Resp: 17 (12/10/24 1216)  BP: (!) 141/72 (12/10/24 1111)  SpO2: (!) 94 % (12/10/24 1541) Vital Signs (24h Range):  Temp:  [97.7 °F (36.5 °C)-98.7 °F (37.1 °C)] 97.7 °F (36.5 °C)  Pulse:  [78-87] 87  Resp:  [16-20] 17  SpO2:  [94 %-100 %] 94 %  BP: (108-141)/(72-84) 141/72     Weight: 97.2 kg (214 lb 4.6 oz)  Body mass index is 39.19 kg/m².  No intake or output data in the 24 hours ending 12/10/24 1552      Physical Exam  Vitals reviewed.   Constitutional:       Appearance: She is obese. She is not toxic-appearing or diaphoretic.   HENT:      Head: Normocephalic and atraumatic.      Mouth/Throat:      Mouth: Mucous membranes are moist.   Eyes:      Conjunctiva/sclera: Conjunctivae normal.   Cardiovascular:      Rate and Rhythm: Normal rate and regular rhythm.      Heart sounds: Normal heart sounds. No murmur heard.  Pulmonary:      Effort: Pulmonary effort is normal. No respiratory distress.      Breath sounds: Normal breath sounds. No wheezing, rhonchi or rales.   Chest:      Chest wall: Tenderness present.   Abdominal:      General: Bowel sounds are normal. There is no distension.      Palpations: Abdomen is soft.      Tenderness: There is abdominal tenderness (R>>L). There is no guarding or rebound.   Musculoskeletal:      Right lower leg: No edema.      Left lower leg: No edema.   Skin:     General: Skin is warm and dry.      Comments: Right lateral lower leg scar, healed   Neurological:      General: No focal deficit present.      Mental Status: She is alert and oriented to person, place, and time. Mental status is at baseline.   Psychiatric:         Mood and Affect: Mood normal.         Behavior:  Behavior normal.             Significant Labs: All pertinent labs within the past 24 hours have been reviewed.    Significant Imaging: I have reviewed all pertinent imaging results/findings within the past 24 hours.        Assessment and Plan     * Vaso-occlusive pain due to sickle cell disease  Avascular necrosis of femur  No cough, fever, consolidation.  On room air.  No concern for acute chest at this time.  Cont home folic acid and hydroxyurea  Multimodal pain management with scheduled IV Dilaudid, oxy for breakthrough, scheduled MS Contin, gabapentin, Robaxin and Tylenol.  Cont IV fluids  Avascular necrosis of femurs noted on prior imaging.  MRI bilateral femurs pending    Goals of care, counseling/discussion  Advance Care Planning    Code Status  I engaged the the patient in a voluntary conversation about the patient's preferences for care  at the very end of life. The patient wishes to have a natural, peaceful death.  Along those lines, the patient does not wish to have CPR or other invasive treatments performed when her heart and/or breathing stops. I communicated to the patient that a DNR order would be placed in her medical record to reflect this preference.    A total of 10 min was spent on advance care planning, goals of care discussion, emotional support, formulating and communicating prognosis and exploring burden/benefit of various approaches of treatment. This discussion occurred on a fully voluntary basis with the verbal consent of the patient and/or family.     Diarrhea  Abdominal pain  Reflux  Reports similar sx with prior pain crises. Infectious GI w/u in Nov '24 negative.  Defer repeat stool studies for now  Cont IVF  Bentyl QID  Continue home PPI  Trial carafate  PRN maalox, simethicone, antiemetics, imodium, etc     History of pulmonary embolism  Compliant with home Eliquis, cont    Anxiety and depression  Cont home Prozac and p.r.n. Vistaril    Obesity (BMI 30-39.9)  Body mass index is 39.19  kg/m². Morbid obesity complicates all aspects of disease management from diagnostic modalities to treatment.     Continuous use of opioids  Ran out of pain meds day prior to admission.  Will need outpatient physicians to prescribe long-term pain meds.  Consider pain management.      VTE Risk Mitigation (From admission, onward)           Ordered     apixaban tablet 5 mg  2 times daily         12/09/24 2323     IP VTE HIGH RISK PATIENT  Once         12/09/24 2321     Place sequential compression device  Until discontinued         12/09/24 2321                    Discharge Planning   ALYSE: 12/13/2024     Code Status: DNR   Medical Readiness for Discharge Date:                Arabella Herrera MD  Department of Hospital Medicine   [facility] - Neurosurgery ([facility])

## 2024-12-10 NOTE — ASSESSMENT & PLAN NOTE
Ran out of pain meds day prior to admission.  Will need outpatient physicians to prescribe long-term pain meds.  Consider pain management.

## 2024-12-10 NOTE — ASSESSMENT & PLAN NOTE
Avascular necrosis of femur  No cough, fever, consolidation.  On room air.  No concern for acute chest at this time.  Cont home folic acid and hydroxyurea  Multimodal pain management with scheduled IV Dilaudid, oxy for breakthrough, scheduled MS Contin, gabapentin, Robaxin and Tylenol.  Cont IV fluids  Avascular necrosis of femurs noted on prior imaging.  MRI bilateral femurs pending

## 2024-12-11 PROBLEM — R33.9 URINARY RETENTION: Status: ACTIVE | Noted: 2024-12-11

## 2024-12-11 LAB
ALBUMIN SERPL BCP-MCNC: 3.4 G/DL (ref 3.5–5.2)
ALP SERPL-CCNC: 89 U/L (ref 40–150)
ALT SERPL W/O P-5'-P-CCNC: 20 U/L (ref 10–44)
ANION GAP SERPL CALC-SCNC: 10 MMOL/L (ref 8–16)
AST SERPL-CCNC: 32 U/L (ref 10–40)
BILIRUB SERPL-MCNC: 0.6 MG/DL (ref 0.1–1)
BUN SERPL-MCNC: 14 MG/DL (ref 6–20)
CALCIUM SERPL-MCNC: 8.9 MG/DL (ref 8.7–10.5)
CHLORIDE SERPL-SCNC: 107 MMOL/L (ref 95–110)
CO2 SERPL-SCNC: 23 MMOL/L (ref 23–29)
CREAT SERPL-MCNC: 1.8 MG/DL (ref 0.5–1.4)
CREAT UR-MCNC: 99 MG/DL (ref 15–325)
ERYTHROCYTE [DISTWIDTH] IN BLOOD BY AUTOMATED COUNT: 18.9 % (ref 11.5–14.5)
EST. GFR  (NO RACE VARIABLE): 34.8 ML/MIN/1.73 M^2
GLUCOSE SERPL-MCNC: 110 MG/DL (ref 70–110)
HCT VFR BLD AUTO: 27 % (ref 37–48.5)
HGB BLD-MCNC: 8.6 G/DL (ref 12–16)
MAGNESIUM SERPL-MCNC: 2 MG/DL (ref 1.6–2.6)
MCH RBC QN AUTO: 21.1 PG (ref 27–31)
MCHC RBC AUTO-ENTMCNC: 31.9 G/DL (ref 32–36)
MCV RBC AUTO: 66 FL (ref 82–98)
OSMOLALITY UR: 365 MOSM/KG (ref 50–1200)
PHOSPHATE SERPL-MCNC: 5.2 MG/DL (ref 2.7–4.5)
PLATELET # BLD AUTO: 352 K/UL (ref 150–450)
PMV BLD AUTO: 9.7 FL (ref 9.2–12.9)
POTASSIUM SERPL-SCNC: 4.3 MMOL/L (ref 3.5–5.1)
PROT SERPL-MCNC: 7.2 G/DL (ref 6–8.4)
RBC # BLD AUTO: 4.08 M/UL (ref 4–5.4)
SODIUM SERPL-SCNC: 140 MMOL/L (ref 136–145)
SODIUM UR-SCNC: 20 MMOL/L (ref 20–250)
WBC # BLD AUTO: 6.66 K/UL (ref 3.9–12.7)

## 2024-12-11 PROCEDURE — 80053 COMPREHEN METABOLIC PANEL: CPT | Performed by: FAMILY MEDICINE

## 2024-12-11 PROCEDURE — 63600175 PHARM REV CODE 636 W HCPCS: Performed by: STUDENT IN AN ORGANIZED HEALTH CARE EDUCATION/TRAINING PROGRAM

## 2024-12-11 PROCEDURE — 84300 ASSAY OF URINE SODIUM: CPT | Performed by: STUDENT IN AN ORGANIZED HEALTH CARE EDUCATION/TRAINING PROGRAM

## 2024-12-11 PROCEDURE — 11000001 HC ACUTE MED/SURG PRIVATE ROOM

## 2024-12-11 PROCEDURE — 36415 COLL VENOUS BLD VENIPUNCTURE: CPT | Performed by: FAMILY MEDICINE

## 2024-12-11 PROCEDURE — 63600175 PHARM REV CODE 636 W HCPCS: Performed by: FAMILY MEDICINE

## 2024-12-11 PROCEDURE — 82570 ASSAY OF URINE CREATININE: CPT | Performed by: STUDENT IN AN ORGANIZED HEALTH CARE EDUCATION/TRAINING PROGRAM

## 2024-12-11 PROCEDURE — 83935 ASSAY OF URINE OSMOLALITY: CPT | Performed by: STUDENT IN AN ORGANIZED HEALTH CARE EDUCATION/TRAINING PROGRAM

## 2024-12-11 PROCEDURE — 84100 ASSAY OF PHOSPHORUS: CPT | Performed by: FAMILY MEDICINE

## 2024-12-11 PROCEDURE — 25000003 PHARM REV CODE 250: Performed by: STUDENT IN AN ORGANIZED HEALTH CARE EDUCATION/TRAINING PROGRAM

## 2024-12-11 PROCEDURE — 85027 COMPLETE CBC AUTOMATED: CPT | Performed by: FAMILY MEDICINE

## 2024-12-11 PROCEDURE — 25000003 PHARM REV CODE 250: Performed by: FAMILY MEDICINE

## 2024-12-11 PROCEDURE — 51701 INSERT BLADDER CATHETER: CPT

## 2024-12-11 PROCEDURE — 51798 US URINE CAPACITY MEASURE: CPT

## 2024-12-11 PROCEDURE — 83735 ASSAY OF MAGNESIUM: CPT | Performed by: FAMILY MEDICINE

## 2024-12-11 RX ORDER — MUPIROCIN 20 MG/G
OINTMENT TOPICAL 2 TIMES DAILY
Status: DISPENSED | OUTPATIENT
Start: 2024-12-11 | End: 2024-12-16

## 2024-12-11 RX ORDER — POLYETHYLENE GLYCOL 3350 17 G/17G
17 POWDER, FOR SOLUTION ORAL DAILY
Status: DISCONTINUED | OUTPATIENT
Start: 2024-12-11 | End: 2024-12-18 | Stop reason: HOSPADM

## 2024-12-11 RX ORDER — HYDROMORPHONE HYDROCHLORIDE 1 MG/ML
3 INJECTION, SOLUTION INTRAMUSCULAR; INTRAVENOUS; SUBCUTANEOUS
Status: DISCONTINUED | OUTPATIENT
Start: 2024-12-11 | End: 2024-12-18

## 2024-12-11 RX ORDER — HYDROMORPHONE HYDROCHLORIDE 1 MG/ML
3 INJECTION, SOLUTION INTRAMUSCULAR; INTRAVENOUS; SUBCUTANEOUS
Status: DISCONTINUED | OUTPATIENT
Start: 2024-12-11 | End: 2024-12-11

## 2024-12-11 RX ORDER — SODIUM CHLORIDE 450 MG/100ML
INJECTION, SOLUTION INTRAVENOUS CONTINUOUS
Status: ACTIVE | OUTPATIENT
Start: 2024-12-11 | End: 2024-12-12

## 2024-12-11 RX ADMIN — SODIUM CHLORIDE: 4.5 INJECTION, SOLUTION INTRAVENOUS at 09:12

## 2024-12-11 RX ADMIN — MORPHINE SULFATE 30 MG: 30 TABLET, FILM COATED, EXTENDED RELEASE ORAL at 10:12

## 2024-12-11 RX ADMIN — HYDROMORPHONE HYDROCHLORIDE 3 MG: 1 INJECTION, SOLUTION INTRAMUSCULAR; INTRAVENOUS; SUBCUTANEOUS at 04:12

## 2024-12-11 RX ADMIN — HYDROMORPHONE HYDROCHLORIDE 3 MG: 1 INJECTION, SOLUTION INTRAMUSCULAR; INTRAVENOUS; SUBCUTANEOUS at 12:12

## 2024-12-11 RX ADMIN — DICYCLOMINE HYDROCHLORIDE 10 MG: 10 CAPSULE ORAL at 10:12

## 2024-12-11 RX ADMIN — OXYCODONE HYDROCHLORIDE 15 MG: 10 TABLET ORAL at 07:12

## 2024-12-11 RX ADMIN — FOLIC ACID 1 MG: 1 TABLET ORAL at 08:12

## 2024-12-11 RX ADMIN — SUCRALFATE 1 G: 1 SUSPENSION ORAL at 12:12

## 2024-12-11 RX ADMIN — POLYETHYLENE GLYCOL 3350 17 G: 17 POWDER, FOR SOLUTION ORAL at 12:12

## 2024-12-11 RX ADMIN — HYDROMORPHONE HYDROCHLORIDE 3 MG: 1 INJECTION, SOLUTION INTRAMUSCULAR; INTRAVENOUS; SUBCUTANEOUS at 08:12

## 2024-12-11 RX ADMIN — METHOCARBAMOL 1500 MG: 750 TABLET ORAL at 12:12

## 2024-12-11 RX ADMIN — GABAPENTIN 600 MG: 300 CAPSULE ORAL at 03:12

## 2024-12-11 RX ADMIN — HYDROMORPHONE HYDROCHLORIDE 3 MG: 1 INJECTION, SOLUTION INTRAMUSCULAR; INTRAVENOUS; SUBCUTANEOUS at 09:12

## 2024-12-11 RX ADMIN — SUCRALFATE 1 G: 1 SUSPENSION ORAL at 05:12

## 2024-12-11 RX ADMIN — PROMETHAZINE HYDROCHLORIDE 25 MG: 12.5 TABLET ORAL at 07:12

## 2024-12-11 RX ADMIN — HYDROMORPHONE HYDROCHLORIDE 3 MG: 1 INJECTION, SOLUTION INTRAMUSCULAR; INTRAVENOUS; SUBCUTANEOUS at 03:12

## 2024-12-11 RX ADMIN — HYDROXYUREA 1000 MG: 500 CAPSULE ORAL at 08:12

## 2024-12-11 RX ADMIN — FLUOXETINE HYDROCHLORIDE 40 MG: 20 CAPSULE ORAL at 08:12

## 2024-12-11 RX ADMIN — MUPIROCIN: 20 OINTMENT TOPICAL at 08:12

## 2024-12-11 RX ADMIN — MORPHINE SULFATE 30 MG: 30 TABLET, FILM COATED, EXTENDED RELEASE ORAL at 08:12

## 2024-12-11 RX ADMIN — ACETAMINOPHEN 1000 MG: 500 TABLET ORAL at 05:12

## 2024-12-11 RX ADMIN — METHOCARBAMOL 1500 MG: 750 TABLET ORAL at 05:12

## 2024-12-11 RX ADMIN — HYDROMORPHONE HYDROCHLORIDE 3 MG: 1 INJECTION, SOLUTION INTRAMUSCULAR; INTRAVENOUS; SUBCUTANEOUS at 05:12

## 2024-12-11 RX ADMIN — PROMETHAZINE HYDROCHLORIDE 25 MG: 12.5 TABLET ORAL at 08:12

## 2024-12-11 RX ADMIN — APIXABAN 5 MG: 5 TABLET, FILM COATED ORAL at 08:12

## 2024-12-11 RX ADMIN — ACETAMINOPHEN 1000 MG: 500 TABLET ORAL at 10:12

## 2024-12-11 RX ADMIN — DICYCLOMINE HYDROCHLORIDE 10 MG: 10 CAPSULE ORAL at 12:12

## 2024-12-11 RX ADMIN — ACETAMINOPHEN 1000 MG: 500 TABLET ORAL at 03:12

## 2024-12-11 RX ADMIN — SODIUM CHLORIDE: 4.5 INJECTION, SOLUTION INTRAVENOUS at 07:12

## 2024-12-11 RX ADMIN — HYDROMORPHONE HYDROCHLORIDE 3 MG: 1 INJECTION, SOLUTION INTRAMUSCULAR; INTRAVENOUS; SUBCUTANEOUS at 01:12

## 2024-12-11 RX ADMIN — METHOCARBAMOL 1500 MG: 750 TABLET ORAL at 10:12

## 2024-12-11 RX ADMIN — GABAPENTIN 600 MG: 300 CAPSULE ORAL at 10:12

## 2024-12-11 RX ADMIN — DICYCLOMINE HYDROCHLORIDE 10 MG: 10 CAPSULE ORAL at 08:12

## 2024-12-11 RX ADMIN — SUCRALFATE 1 G: 1 SUSPENSION ORAL at 01:12

## 2024-12-11 RX ADMIN — METHOCARBAMOL 1500 MG: 750 TABLET ORAL at 08:12

## 2024-12-11 RX ADMIN — DIPHENHYDRAMINE HYDROCHLORIDE 50 MG: 50 CAPSULE ORAL at 08:12

## 2024-12-11 RX ADMIN — PANTOPRAZOLE SODIUM 40 MG: 40 TABLET, DELAYED RELEASE ORAL at 08:12

## 2024-12-11 RX ADMIN — PROCHLORPERAZINE EDISYLATE 5 MG: 5 INJECTION INTRAMUSCULAR; INTRAVENOUS at 12:12

## 2024-12-11 RX ADMIN — APIXABAN 5 MG: 5 TABLET, FILM COATED ORAL at 10:12

## 2024-12-11 RX ADMIN — DICYCLOMINE HYDROCHLORIDE 10 MG: 10 CAPSULE ORAL at 05:12

## 2024-12-11 RX ADMIN — GABAPENTIN 600 MG: 300 CAPSULE ORAL at 08:12

## 2024-12-11 RX ADMIN — LIDOCAINE 1 PATCH: 50 PATCH CUTANEOUS at 12:12

## 2024-12-11 NOTE — PROGRESS NOTES
[facility] - Neurosurgery ([facility])  [facility] Medicine  Progress Note    Patient Name: Nazanin Malone  MRN: [id number]  Patient Class: IP- Inpatient   Admission Date: 12/9/2024  Length of Stay: 1 days  Attending Physician: Arabella Herrera MD  Primary Care Provider: Pee Montgomery MD        Subjective     Principal Problem:Vaso-occlusive pain due to sickle cell disease      HPI:  45 yo F PMH as below, notable sickle cell anemia with recurrent vaso-occlusive pain episodes requiring hospital admissions, continuous opioid use, avascular necrosis of the femurs, and pulmonary embolism on Eliquis who presents to the ED with chief complaint of abdominal and lower extremity pain consistent with vaso-occlusive pain episode.  Pt was recently admitted to this facility from 11/29-12/3 for vaso-occlusive pain episode.  Pt says that she felt good for approx 3 days.  Pt reports nonbloody diarrhea that began 3 days prior to admission that she attributed to gastroenteritis.  She reports associated nausea with no vomiting, no sick contacts.  Onset of diarrhea did not seem to be related to any specific food she ate that day.  Since then, pt has had significantly less PO intake and noticed increasing lower extremity and hip pain as well as abdominal pain radiating into the chest that is typical of prior vaso-occlusive pain episode.  Pt ran out of pain medications one day prior to admission.  She has established with Hematology but had not yet been prescribed hydroxyurea.  Pt does take this when admitted and has not had significant side-effects from it.  Currently, pt says that abdominal, chest, hips, and lower extremity pain improved with IV Dilaudid.  She reports associated SOB when the pain is severe.  She reports intermittent headache that resolved with pain medications in the hospital.  She reports ongoing diarrhea 2-3 episodes per day that are described as loose and not watery.  She denies urinary symptoms, blood in urine  and stool and denies lower extremity edema.    Overview/Hospital Course:  Admitted for sickle cell pain crisis. Reports severe abdominal pain w/ nausea and diarrhea has happened w/ prior pain crises as well. Had infectious GI workup in Nov '24 that was negative. No diarrhea for 48hrs after admission. Stool studies d/c'ed. Continued w/ multimodal analgesic regimen, w/ add'n of Bentyl, carafate & addn'l PRNs for supportive care.     Interval History: Difficulty urinating overnight/this AM w/ e/o urine retention on bladder scan; straight cathed x2. UA nml. Reports continued R hip/leg pain and abdominal pain. No diarrhea since admission (reports no BM since Monday). Endorses nausea but no vomiting. Slightly progressive FLOR per AM labs. Urine studies ordered & continuing w/ IVF. If requires a 3rd straight cath in 24hrs, will place Loyd. Also endorses persistent/slightly worsening chest pain & SOB; no coughing, fevers, leukocytosis or hypoxia. CXR ordered. Titrating analgesic regimen to hopefully achieve better pain control.     Review of Systems   Constitutional:  Positive for appetite change and fatigue. Negative for chills, diaphoresis and fever.   HENT:  Negative for congestion, rhinorrhea, sore throat and trouble swallowing.    Eyes:  Negative for photophobia and visual disturbance.   Respiratory:  Positive for chest tightness and shortness of breath. Negative for cough and wheezing.    Cardiovascular:  Positive for chest pain. Negative for palpitations and leg swelling.   Gastrointestinal:  Positive for abdominal pain and nausea. Negative for blood in stool, constipation, diarrhea and vomiting.   Genitourinary:  Positive for difficulty urinating. Negative for dysuria, flank pain and hematuria.   Musculoskeletal:  Positive for arthralgias. Negative for joint swelling and neck pain.   Skin:  Negative for rash and wound.   Neurological:  Negative for dizziness, light-headedness and headaches.   Psychiatric/Behavioral:   Negative for agitation and confusion.        Objective:     Vital Signs (Most Recent):  Temp: 98.5 °F (36.9 °C) (12/11/24 1527)  Pulse: 99 (12/11/24 1527)  Resp: 17 (12/11/24 1740)  BP: 126/68 (12/11/24 1527)  SpO2: (!) 93 % (12/11/24 1527) Vital Signs (24h Range):  Temp:  [97.8 °F (36.6 °C)-98.7 °F (37.1 °C)] 98.5 °F (36.9 °C)  Pulse:  [] 99  Resp:  [17-20] 17  SpO2:  [92 %-95 %] 93 %  BP: (126-147)/(68-91) 126/68     Weight: 97.2 kg (214 lb 4.6 oz)  Body mass index is 39.19 kg/m².    Intake/Output Summary (Last 24 hours) at 12/11/2024 1747  Last data filed at 12/11/2024 1600  Gross per 24 hour   Intake 200 ml   Output 1100 ml   Net -900 ml         Physical Exam  Vitals reviewed.   Constitutional:       Appearance: She is obese. She is not toxic-appearing or diaphoretic.   HENT:      Head: Normocephalic and atraumatic.      Mouth/Throat:      Mouth: Mucous membranes are moist.   Eyes:      Conjunctiva/sclera: Conjunctivae normal.   Cardiovascular:      Rate and Rhythm: Normal rate and regular rhythm.      Heart sounds: Normal heart sounds. No murmur heard.  Pulmonary:      Effort: Pulmonary effort is normal. No respiratory distress.      Breath sounds: Normal breath sounds. No wheezing, rhonchi or rales.   Chest:      Chest wall: Tenderness present.   Abdominal:      General: Bowel sounds are normal. There is no distension.      Palpations: Abdomen is soft.      Tenderness: There is abdominal tenderness (R>>L). There is no guarding or rebound.   Musculoskeletal:      Right lower leg: No edema.      Left lower leg: No edema.   Skin:     General: Skin is warm and dry.      Comments: Right lateral lower leg scar, healed   Neurological:      General: No focal deficit present.      Mental Status: She is alert and oriented to person, place, and time. Mental status is at baseline.   Psychiatric:         Mood and Affect: Mood normal.         Behavior: Behavior normal.             Significant Labs: All pertinent labs  within the past 24 hours have been reviewed.    Significant Imaging: I have reviewed all pertinent imaging results/findings within the past 24 hours.        Assessment and Plan     * Vaso-occlusive pain due to sickle cell disease  Avascular necrosis of femur  No cough, fever, consolidation, or leukocytosis. Stable on RA. No concern for acute chest at this time. Repeat CXR stable.   Cont home folic acid and hydroxyurea  Multimodal pain management with scheduled IV Dilaudid, oxy for breakthrough, scheduled MS Contin, gabapentin, Robaxin and Tylenol.  Cont IVF  Avascular necrosis of femurs noted on prior imaging.  MRI bilateral femurs pending    Urinary retention  Developed difficulty urinating w/ e/o urinary retention on bladder scan on 12/12, requiring repeat straight caths  UA nml, no e/o infxn  Straight cath PRN for PVR of > 350. If requiring > 2 straight caths/24 hours --> place Loyd     Goals of care, counseling/discussion  Advance Care Planning    Code Status  I engaged the the patient in a voluntary conversation about the patient's preferences for care  at the very end of life. The patient wishes to have a natural, peaceful death.  Along those lines, the patient does not wish to have CPR or other invasive treatments performed when her heart and/or breathing stops. I communicated to the patient that a DNR order would be placed in her medical record to reflect this preference.    A total of 10 min was spent on advance care planning, goals of care discussion, emotional support, formulating and communicating prognosis and exploring burden/benefit of various approaches of treatment. This discussion occurred on a fully voluntary basis with the verbal consent of the patient and/or family.     Diarrhea  Abdominal pain  Reflux  Reports similar sx with prior pain crises. Infectious GI w/u in Nov '24 negative.  Defer repeat stool studies for now  Cont IVF  Bentyl QID  Continue home PPI  Trial carafate  PRN maalox,  simethicone, antiemetics, imodium, etc     History of pulmonary embolism  Compliant with home Eliquis, cont    Anxiety and depression  Cont home Prozac and p.r.n. Vistaril    Obesity (BMI 30-39.9)  Body mass index is 39.19 kg/m². Morbid obesity complicates all aspects of disease management from diagnostic modalities to treatment.     Drug dependence  Ran out of pain meds day prior to admission.  Will need outpatient physicians to prescribe long-term pain meds.  Consider pain management.      VTE Risk Mitigation (From admission, onward)           Ordered     apixaban tablet 5 mg  2 times daily         12/09/24 2323     IP VTE HIGH RISK PATIENT  Once         12/09/24 2321     Place sequential compression device  Until discontinued         12/09/24 2321                    Discharge Planning   ALYSE: 12/13/2024     Code Status: DNR   Medical Readiness for Discharge Date:                            Arabella Herrera MD  Department of Hospital Medicine   [facility] Neurosurgery ([facility])

## 2024-12-11 NOTE — ASSESSMENT & PLAN NOTE
Developed difficulty urinating w/ e/o urinary retention on bladder scan on 12/12, requiring repeat straight caths  UA nml, no e/o infxn  Straight cath PRN for PVR of > 350. If requiring > 2 straight caths/24 hours --> place Lody

## 2024-12-11 NOTE — SUBJECTIVE & OBJECTIVE
Interval History: Difficulty urinating overnight/this AM w/ e/o urine retention on bladder scan; straight cathed x2. UA nml. Reports continued R hip/leg pain and abdominal pain. No diarrhea since admission (reports no BM since Monday). Endorses nausea but no vomiting. Slightly progressive FLOR per AM labs. Urine studies ordered & continuing w/ IVF. If requires a 3rd straight cath in 24hrs, will place Loyd. Also endorses persistent/slightly worsening chest pain & SOB; no coughing, fevers, leukocytosis or hypoxia. CXR ordered. Titrating analgesic regimen to hopefully achieve better pain control.     Review of Systems   Constitutional:  Positive for appetite change and fatigue. Negative for chills, diaphoresis and fever.   HENT:  Negative for congestion, rhinorrhea, sore throat and trouble swallowing.    Eyes:  Negative for photophobia and visual disturbance.   Respiratory:  Positive for chest tightness and shortness of breath. Negative for cough and wheezing.    Cardiovascular:  Positive for chest pain. Negative for palpitations and leg swelling.   Gastrointestinal:  Positive for abdominal pain and nausea. Negative for blood in stool, constipation, diarrhea and vomiting.   Genitourinary:  Positive for difficulty urinating. Negative for dysuria, flank pain and hematuria.   Musculoskeletal:  Positive for arthralgias. Negative for joint swelling and neck pain.   Skin:  Negative for rash and wound.   Neurological:  Negative for dizziness, light-headedness and headaches.   Psychiatric/Behavioral:  Negative for agitation and confusion.        Objective:     Vital Signs (Most Recent):  Temp: 98.5 °F (36.9 °C) (12/11/24 1527)  Pulse: 99 (12/11/24 1527)  Resp: 17 (12/11/24 1740)  BP: 126/68 (12/11/24 1527)  SpO2: (!) 93 % (12/11/24 1527) Vital Signs (24h Range):  Temp:  [97.8 °F (36.6 °C)-98.7 °F (37.1 °C)] 98.5 °F (36.9 °C)  Pulse:  [] 99  Resp:  [17-20] 17  SpO2:  [92 %-95 %] 93 %  BP: (126-147)/(68-91) 126/68      Weight: 97.2 kg (214 lb 4.6 oz)  Body mass index is 39.19 kg/m².    Intake/Output Summary (Last 24 hours) at 12/11/2024 1747  Last data filed at 12/11/2024 1600  Gross per 24 hour   Intake 200 ml   Output 1100 ml   Net -900 ml         Physical Exam  Vitals reviewed.   Constitutional:       Appearance: She is obese. She is not toxic-appearing or diaphoretic.   HENT:      Head: Normocephalic and atraumatic.      Mouth/Throat:      Mouth: Mucous membranes are moist.   Eyes:      Conjunctiva/sclera: Conjunctivae normal.   Cardiovascular:      Rate and Rhythm: Normal rate and regular rhythm.      Heart sounds: Normal heart sounds. No murmur heard.  Pulmonary:      Effort: Pulmonary effort is normal. No respiratory distress.      Breath sounds: Normal breath sounds. No wheezing, rhonchi or rales.   Chest:      Chest wall: Tenderness present.   Abdominal:      General: Bowel sounds are normal. There is no distension.      Palpations: Abdomen is soft.      Tenderness: There is abdominal tenderness (R>>L). There is no guarding or rebound.   Musculoskeletal:      Right lower leg: No edema.      Left lower leg: No edema.   Skin:     General: Skin is warm and dry.      Comments: Right lateral lower leg scar, healed   Neurological:      General: No focal deficit present.      Mental Status: She is alert and oriented to person, place, and time. Mental status is at baseline.   Psychiatric:         Mood and Affect: Mood normal.         Behavior: Behavior normal.             Significant Labs: All pertinent labs within the past 24 hours have been reviewed.    Significant Imaging: I have reviewed all pertinent imaging results/findings within the past 24 hours.

## 2024-12-11 NOTE — PLAN OF CARE
Problem: Adult Inpatient Plan of Care  Goal: Plan of Care Review  Outcome: Progressing  Goal: Patient-Specific Goal (Individualized)  Outcome: Progressing  Goal: Optimal Comfort and Wellbeing  Outcome: Progressing  Goal: Readiness for Transition of Care  Outcome: Progressing     Problem: Infection  Goal: Absence of Infection Signs and Symptoms  Outcome: Progressing   Patient alert and oriented x4.  POC and pain management reviewed with patient at the bedside. Continuous IV fluid infusing. Scheduled Pain medications given, with good results.   All safety measures maintained. VS within normal limits. No acute distress noted. POC ongoing.

## 2024-12-11 NOTE — ASSESSMENT & PLAN NOTE
Avascular necrosis of femur  No cough, fever, consolidation, or leukocytosis. Stable on RA. No concern for acute chest at this time. Repeat CXR stable.   Cont home folic acid and hydroxyurea  Multimodal pain management with scheduled IV Dilaudid, oxy for breakthrough, scheduled MS Contin, gabapentin, Robaxin and Tylenol.  Cont IVF  Avascular necrosis of femurs noted on prior imaging.  MRI bilateral femurs pending

## 2024-12-11 NOTE — PLAN OF CARE
Problem: Adult Inpatient Plan of Care  Goal: Plan of Care Review  Outcome: Progressing  Goal: Patient-Specific Goal (Individualized)  Outcome: Progressing  Goal: Optimal Comfort and Wellbeing  Outcome: Progressing  Goal: Readiness for Transition of Care  Outcome: Progressing

## 2024-12-12 PROBLEM — E87.6 HYPOKALEMIA: Status: RESOLVED | Noted: 2020-01-10 | Resolved: 2024-12-12

## 2024-12-12 PROBLEM — Z71.89 GOALS OF CARE, COUNSELING/DISCUSSION: Status: RESOLVED | Noted: 2024-12-10 | Resolved: 2024-12-12

## 2024-12-12 PROBLEM — I10 HYPERTENSION: Chronic | Status: ACTIVE | Noted: 2017-04-16

## 2024-12-12 PROBLEM — D84.81: Chronic | Status: ACTIVE | Noted: 2020-01-09

## 2024-12-12 PROBLEM — Z79.01 CHRONIC ANTICOAGULATION: Chronic | Status: ACTIVE | Noted: 2024-08-16

## 2024-12-12 PROBLEM — F41.9 ANXIETY AND DEPRESSION: Chronic | Status: ACTIVE | Noted: 2018-03-20

## 2024-12-12 PROBLEM — D68.59 HYPERCOAGULABLE STATE: Chronic | Status: ACTIVE | Noted: 2024-12-10

## 2024-12-12 PROBLEM — K29.50 CHRONIC GASTRITIS: Chronic | Status: ACTIVE | Noted: 2024-08-22

## 2024-12-12 PROBLEM — D84.81: Status: ACTIVE | Noted: 2020-01-09

## 2024-12-12 PROBLEM — F32.A ANXIETY AND DEPRESSION: Chronic | Status: ACTIVE | Noted: 2018-03-20

## 2024-12-12 LAB
ALBUMIN SERPL BCP-MCNC: 3.2 G/DL (ref 3.5–5.2)
ALP SERPL-CCNC: 92 U/L (ref 40–150)
ALT SERPL W/O P-5'-P-CCNC: 18 U/L (ref 10–44)
ANION GAP SERPL CALC-SCNC: 5 MMOL/L (ref 8–16)
AST SERPL-CCNC: 27 U/L (ref 10–40)
BILIRUB SERPL-MCNC: 0.4 MG/DL (ref 0.1–1)
BUN SERPL-MCNC: 9 MG/DL (ref 6–20)
CALCIUM SERPL-MCNC: 9.1 MG/DL (ref 8.7–10.5)
CHLORIDE SERPL-SCNC: 108 MMOL/L (ref 95–110)
CO2 SERPL-SCNC: 26 MMOL/L (ref 23–29)
CREAT SERPL-MCNC: 1.1 MG/DL (ref 0.5–1.4)
ERYTHROCYTE [DISTWIDTH] IN BLOOD BY AUTOMATED COUNT: 19.1 % (ref 11.5–14.5)
EST. GFR  (NO RACE VARIABLE): >60 ML/MIN/1.73 M^2
GLUCOSE SERPL-MCNC: 99 MG/DL (ref 70–110)
HCT VFR BLD AUTO: 27 % (ref 37–48.5)
HGB BLD-MCNC: 8.5 G/DL (ref 12–16)
MAGNESIUM SERPL-MCNC: 1.9 MG/DL (ref 1.6–2.6)
MCH RBC QN AUTO: 20.9 PG (ref 27–31)
MCHC RBC AUTO-ENTMCNC: 31.5 G/DL (ref 32–36)
MCV RBC AUTO: 66 FL (ref 82–98)
PHOSPHATE SERPL-MCNC: 3.6 MG/DL (ref 2.7–4.5)
PLATELET # BLD AUTO: 344 K/UL (ref 150–450)
PMV BLD AUTO: 9.4 FL (ref 9.2–12.9)
POTASSIUM SERPL-SCNC: 3.9 MMOL/L (ref 3.5–5.1)
PROT SERPL-MCNC: 6.8 G/DL (ref 6–8.4)
RBC # BLD AUTO: 4.07 M/UL (ref 4–5.4)
SODIUM SERPL-SCNC: 139 MMOL/L (ref 136–145)
WBC # BLD AUTO: 6.64 K/UL (ref 3.9–12.7)

## 2024-12-12 PROCEDURE — 80053 COMPREHEN METABOLIC PANEL: CPT | Performed by: FAMILY MEDICINE

## 2024-12-12 PROCEDURE — 11000001 HC ACUTE MED/SURG PRIVATE ROOM

## 2024-12-12 PROCEDURE — 63600175 PHARM REV CODE 636 W HCPCS: Performed by: STUDENT IN AN ORGANIZED HEALTH CARE EDUCATION/TRAINING PROGRAM

## 2024-12-12 PROCEDURE — 25000003 PHARM REV CODE 250: Performed by: FAMILY MEDICINE

## 2024-12-12 PROCEDURE — 83735 ASSAY OF MAGNESIUM: CPT | Performed by: FAMILY MEDICINE

## 2024-12-12 PROCEDURE — 36415 COLL VENOUS BLD VENIPUNCTURE: CPT | Performed by: FAMILY MEDICINE

## 2024-12-12 PROCEDURE — 84100 ASSAY OF PHOSPHORUS: CPT | Performed by: FAMILY MEDICINE

## 2024-12-12 PROCEDURE — 85027 COMPLETE CBC AUTOMATED: CPT | Performed by: FAMILY MEDICINE

## 2024-12-12 PROCEDURE — 25000003 PHARM REV CODE 250: Performed by: STUDENT IN AN ORGANIZED HEALTH CARE EDUCATION/TRAINING PROGRAM

## 2024-12-12 RX ADMIN — SUCRALFATE 1 G: 1 SUSPENSION ORAL at 12:12

## 2024-12-12 RX ADMIN — HYDROXYUREA 1000 MG: 500 CAPSULE ORAL at 09:12

## 2024-12-12 RX ADMIN — HYDROMORPHONE HYDROCHLORIDE 3 MG: 1 INJECTION, SOLUTION INTRAMUSCULAR; INTRAVENOUS; SUBCUTANEOUS at 12:12

## 2024-12-12 RX ADMIN — PANTOPRAZOLE SODIUM 40 MG: 40 TABLET, DELAYED RELEASE ORAL at 09:12

## 2024-12-12 RX ADMIN — HYDROMORPHONE HYDROCHLORIDE 3 MG: 1 INJECTION, SOLUTION INTRAMUSCULAR; INTRAVENOUS; SUBCUTANEOUS at 03:12

## 2024-12-12 RX ADMIN — FOLIC ACID 1 MG: 1 TABLET ORAL at 09:12

## 2024-12-12 RX ADMIN — GABAPENTIN 600 MG: 300 CAPSULE ORAL at 03:12

## 2024-12-12 RX ADMIN — POLYETHYLENE GLYCOL 3350 17 G: 17 POWDER, FOR SOLUTION ORAL at 09:12

## 2024-12-12 RX ADMIN — HYDROMORPHONE HYDROCHLORIDE 3 MG: 1 INJECTION, SOLUTION INTRAMUSCULAR; INTRAVENOUS; SUBCUTANEOUS at 09:12

## 2024-12-12 RX ADMIN — METHOCARBAMOL 1500 MG: 750 TABLET ORAL at 12:12

## 2024-12-12 RX ADMIN — SUCRALFATE 1 G: 1 SUSPENSION ORAL at 05:12

## 2024-12-12 RX ADMIN — APIXABAN 5 MG: 5 TABLET, FILM COATED ORAL at 09:12

## 2024-12-12 RX ADMIN — OXYCODONE HYDROCHLORIDE 15 MG: 10 TABLET ORAL at 04:12

## 2024-12-12 RX ADMIN — HYDROMORPHONE HYDROCHLORIDE 3 MG: 1 INJECTION, SOLUTION INTRAMUSCULAR; INTRAVENOUS; SUBCUTANEOUS at 11:12

## 2024-12-12 RX ADMIN — HYDROMORPHONE HYDROCHLORIDE 3 MG: 1 INJECTION, SOLUTION INTRAMUSCULAR; INTRAVENOUS; SUBCUTANEOUS at 05:12

## 2024-12-12 RX ADMIN — DICYCLOMINE HYDROCHLORIDE 10 MG: 10 CAPSULE ORAL at 04:12

## 2024-12-12 RX ADMIN — MORPHINE SULFATE 30 MG: 30 TABLET, FILM COATED, EXTENDED RELEASE ORAL at 09:12

## 2024-12-12 RX ADMIN — ACETAMINOPHEN 1000 MG: 500 TABLET ORAL at 03:12

## 2024-12-12 RX ADMIN — FLUOXETINE HYDROCHLORIDE 40 MG: 20 CAPSULE ORAL at 09:12

## 2024-12-12 RX ADMIN — SUCRALFATE 1 G: 1 SUSPENSION ORAL at 11:12

## 2024-12-12 RX ADMIN — METHOCARBAMOL 1500 MG: 750 TABLET ORAL at 09:12

## 2024-12-12 RX ADMIN — OXYCODONE HYDROCHLORIDE 15 MG: 10 TABLET ORAL at 10:12

## 2024-12-12 RX ADMIN — SUCRALFATE 1 G: 1 SUSPENSION ORAL at 06:12

## 2024-12-12 RX ADMIN — MUPIROCIN: 20 OINTMENT TOPICAL at 09:12

## 2024-12-12 RX ADMIN — METHOCARBAMOL 1500 MG: 750 TABLET ORAL at 04:12

## 2024-12-12 RX ADMIN — DICYCLOMINE HYDROCHLORIDE 10 MG: 10 CAPSULE ORAL at 09:12

## 2024-12-12 RX ADMIN — DICYCLOMINE HYDROCHLORIDE 10 MG: 10 CAPSULE ORAL at 12:12

## 2024-12-12 RX ADMIN — LIDOCAINE 1 PATCH: 50 PATCH CUTANEOUS at 12:12

## 2024-12-12 RX ADMIN — GABAPENTIN 600 MG: 300 CAPSULE ORAL at 09:12

## 2024-12-12 RX ADMIN — ACETAMINOPHEN 1000 MG: 500 TABLET ORAL at 06:12

## 2024-12-12 NOTE — CARE UPDATE
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

## 2024-12-12 NOTE — PLAN OF CARE
Problem: Adult Inpatient Plan of Care  Goal: Plan of Care Review  Outcome: Progressing  Flowsheets (Taken 12/12/2024 0543)  Plan of Care Reviewed With: patient  Goal: Patient-Specific Goal (Individualized)  Outcome: Progressing  Flowsheets (Taken 12/12/2024 0543)  Individualized Care Needs: pain control  Anxieties, Fears or Concerns: pain not being treated  Patient/Family-Specific Goals (Include Timeframe): keep pt and family updated

## 2024-12-12 NOTE — ASSESSMENT & PLAN NOTE
Avascular necrosis of femur  No cough, fever, consolidation, or leukocytosis. Stable on RA. No concern for acute chest at this time. Repeat CXR stable.   Cont home folic acid and hydroxyurea  Multimodal pain management with scheduled IV Dilaudid, oxy for breakthrough, scheduled MS Contin, gabapentin, Robaxin and Tylenol.  IV fluids held due to hypoxia  Avascular necrosis of femurs noted on prior imaging.

## 2024-12-12 NOTE — PROGRESS NOTES
[facility] Neurosurgery ([facility])  [facility] Medicine  Progress Note    Patient Name: Nazanin Malone  MRN: [id number]  Patient Class: IP- Inpatient   Admission Date: 2024  Length of Stay: 2 days  Attending Physician: Brant Freed MD  Primary Care Provider: Pee Montgomery MD        Subjective     Principal Problem:Vaso-occlusive pain due to sickle cell disease        HPI:  Nazanin Malone is a 44 year old Black woman with sickle cell type S beta thalassemia, recurrent vaso-occlusive pain crises, chronic opioid use, avascular necrosis of the femurs, hypertension, asthma, trigeminal neuralgia, history of pulmonary embolism (anticoagulated on apixaban), history of rhabdomyolysis status post fasciotomy in , history of chronic gastritis and duodenitis on 2024, history of  section, history of tubal ligation, history of tonsillectomy. She lives in [place] [facility]. She works at [employer].   She was hospitalized at [facility] from 2024 to 12/3/2024 for vaso-occlusive pain. She returned to [facility] Emergency Department on 2024. She reported feeling good for approximately 3 days. She started having non-bloody diarrhea, loose and occurring 2 to 3 times a day, 3 days prior to presentation that she attributed to gastroenteritis, with associated nausea. She denied sick contacts. Since then, she had less oral intake and noticed increasing lower extremity and hip pain as well as abdominal pain radiating into her chest, typical of vaso-occlusive pain crisis. She ran out of pain medications a day prior to presentation. She has not yet been prescribed hydroxyurea. She takes it when she is admitted to the hospital and has not had significant side effects from it. She had associated shortness of breath when pain was severe and intermittent headache that resolved when she was given pain medications in the emergency  department. She was given IV hydromorphone. She was admitted to Hospital Medicine Team B.    Overview/Hospital Course:  She reported severe abdominal pain with nausea and diarrhea has happened with prior pain crises as well. She had infectious GI workup in November that was negative. She had no diarrhea for 48 hours after admission. Stool studies were cancelled. She was put on IV hydromorphone 3 mg every 3 hours. She was given multimodal analgesic regimen with diphenhydramine, sucralfate, and additional medications as needed for supportive care. Loyd catheter was placed for urinary retention. She became hypoxic. IV fluids were held.     Interval History: No acute events.    Review of Systems   Constitutional:  Negative for chills and fever.   Gastrointestinal:  Positive for abdominal pain and nausea.   Neurological:  Negative for seizures and syncope.     Objective:     Vital Signs (Most Recent):  Temp: 98.6 °F (37 °C) (12/12/24 0336)  Pulse: 98 (12/12/24 0336)  Resp: 18 (12/12/24 0904)  BP: 137/75 (12/12/24 0336)  SpO2: 99 % (12/12/24 0336) Vital Signs (24h Range):  Temp:  [98.1 °F (36.7 °C)-98.7 °F (37.1 °C)] 98.6 °F (37 °C)  Pulse:  [] 98  Resp:  [16-20] 18  SpO2:  [86 %-99 %] 99 %  BP: (113-139)/(68-91) 137/75     Weight: 97.2 kg (214 lb 4.6 oz)  Body mass index is 39.19 kg/m².    Intake/Output Summary (Last 24 hours) at 12/12/2024 1022  Last data filed at 12/12/2024 0545  Gross per 24 hour   Intake 480 ml   Output 2350 ml   Net -1870 ml         Physical Exam  Vitals and nursing note reviewed.   Constitutional:       General: She is not in acute distress.     Appearance: She is well-developed. She is obese. She is not diaphoretic.      Interventions: She is not intubated.Nasal cannula in place.   Pulmonary:      Effort: Pulmonary effort is normal. No accessory muscle usage or respiratory distress. She is not intubated.   Abdominal:      Palpations: Abdomen is soft.      Tenderness: There is no abdominal  tenderness. There is no guarding or rebound.   Skin:     General: Skin is warm and dry.      Coloration: Skin is not jaundiced or pale.   Neurological:      Mental Status: She is alert and oriented to person, place, and time. Mental status is at baseline.      Motor: No seizure activity.   Psychiatric:         Attention and Perception: Attention normal.         Behavior: Behavior is not agitated or aggressive. Behavior is cooperative.             Significant Labs: All pertinent labs within the past 24 hours have been reviewed.    Significant Imaging: I have reviewed all pertinent imaging results/findings within the past 24 hours.  X-Ray Chest AP Portable 12/09/24: FINDINGS:   Right chest wall port catheter tip projects over the distal SVC.   The cardiomediastinal silhouette is not enlarged.  There is no pleural effusion.  The trachea is midline.  The lungs are symmetrically expanded bilaterally with coarse interstitial attenuation, accentuated by habitus..  No large focal consolidation seen.  There is no pneumothorax.  The osseous structures are remarkable for degenerative change..   Impression: Interstitial findings are accentuated by habitus, no large focal consolidation.   MRI Pelvis W WO Contrast 12/10/24: FINDINGS:   Bones: Diffusely intermediate bone marrow signal focal geographic area of abnormal signal with double line margin and thin peripheral enhancement noted in the left femoral neck.  No associated bone marrow edema.  Additional punctate focus of T2 hyperintense signal seen within the proximal femur shaft.  No fracture.   Right hip: Cartilage space is maintained.  Non arthrographic evaluation of the labrum is unremarkable.  No joint effusion.   Left hip: Cartilage space is maintained.  Non arthrographic evaluation of the labrum is unremarkable.  No joint effusion.   Pelvic joints: Sacroiliac joints and pubic symphysis are unremarkable.   Muscles/tendons: Mild tendinosis of the right gluteus medius  muscle.  The remaining regional muscles and tendons appear unremarkable.  No evidence for trochanteric or iliopsoas bursitis.   Miscellaneous: Stranding involving the bilateral gluteal fat.  Subcutaneous stranding in fluid signal within the lower abdominal wall.  Findings are similar prior CT and could be related to prior injection sites.   Impression:  1. Focal osteonecrosis of left femoral neck.  Possible additional subcentimeter focus of osteonecrosis in the proximal left femoral shaft.   2. Diffuse intermediate bone marrow signal, related to sickle cell disease.   X-Ray Chest AP Portable 12/11/24: FINDINGS:   There is a right sided chest port present with the tip of the catheter projecting near the junction of the superior vena cava and right atrium.  The heart and mediastinal structures are within normal limits in size.  There is a linear density within the left lung base laterally likely representing linear atelectasis or a fibrotic streak.  There are a few linear opacities within the right lung apex as well also likely representing linear atelectasis or fibrotic streaks.  There is no evidence for pneumothorax or pleural effusions.  Bony structures are grossly intact.   Impression:  Right chest port with tip of catheter near the junction of the superior vena cava and right atrium.   Linear densities within the right lung apex and left lung base likely representing scarring or linear atelectasis in this appears stable.   No acute chest disease identified.   X-Ray Chest 1 View 12/11/24: FINDINGS:   There is a right-sided port, unchanged.  The lungs are hypoexpanded.  There is continued chronic interstitial prominence in the bilateral lungs, stable.  There is no new focal consolidation.  The pleural spaces are clear.  The cardiac silhouette is enlarged, mild.  There are overlying leads.  Osseous structures are intact.    Assessment and Plan     * Vaso-occlusive pain due to sickle cell disease  Avascular necrosis  of femur  No cough, fever, consolidation, or leukocytosis. Stable on RA. No concern for acute chest at this time. Repeat CXR stable.   Cont home folic acid and hydroxyurea  Multimodal pain management with scheduled IV Dilaudid, oxy for breakthrough, scheduled MS Contin, gabapentin, Robaxin and Tylenol.  IV fluids held due to hypoxia  Avascular necrosis of femurs noted on prior imaging.     Urinary retention  Loyd placed 12/11/2024.     Diarrhea  See Chronic gastritis     Abdominal pain  See Chronic gastritis       Chronic gastritis  Occurs with pain crises. Likely ischemia. Giving dicyclomine, prn medications.      History of pulmonary embolism  Compliant with home Eliquis, cont    Anxiety and depression  Cont home Prozac and p.r.n. Vistaril    Obesity (BMI 30-39.9)  Body mass index is 39.19 kg/m². Morbid obesity complicates all aspects of disease management from diagnostic modalities to treatment.     Drug dependence  Ran out of pain meds day prior to admission.  Will need outpatient physicians to prescribe long-term pain meds.  Consider pain management.      VTE Risk Mitigation (From admission, onward)           Ordered     apixaban tablet 5 mg  2 times daily         12/09/24 2323     IP VTE HIGH RISK PATIENT  Once         12/09/24 2321     Place sequential compression device  Until discontinued         12/09/24 2321                    Discharge Planning   ALYSE: 12/13/2024     Code Status: DNR   Medical Readiness for Discharge Date:                            Brant Freed MD  Department of Hospital Medicine   [facility] - Neurosurgery ([facility])

## 2024-12-12 NOTE — SUBJECTIVE & OBJECTIVE
Interval History: No acute events.    Review of Systems   Constitutional:  Negative for chills and fever.   Gastrointestinal:  Positive for abdominal pain and nausea.   Neurological:  Negative for seizures and syncope.     Objective:     Vital Signs (Most Recent):  Temp: 98.6 °F (37 °C) (12/12/24 0336)  Pulse: 98 (12/12/24 0336)  Resp: 18 (12/12/24 0904)  BP: 137/75 (12/12/24 0336)  SpO2: 99 % (12/12/24 0336) Vital Signs (24h Range):  Temp:  [98.1 °F (36.7 °C)-98.7 °F (37.1 °C)] 98.6 °F (37 °C)  Pulse:  [] 98  Resp:  [16-20] 18  SpO2:  [86 %-99 %] 99 %  BP: (113-139)/(68-91) 137/75     Weight: 97.2 kg (214 lb 4.6 oz)  Body mass index is 39.19 kg/m².    Intake/Output Summary (Last 24 hours) at 12/12/2024 1022  Last data filed at 12/12/2024 0545  Gross per 24 hour   Intake 480 ml   Output 2350 ml   Net -1870 ml         Physical Exam  Vitals and nursing note reviewed.   Constitutional:       General: She is not in acute distress.     Appearance: She is well-developed. She is obese. She is not diaphoretic.      Interventions: She is not intubated.Nasal cannula in place.   Pulmonary:      Effort: Pulmonary effort is normal. No accessory muscle usage or respiratory distress. She is not intubated.   Abdominal:      Palpations: Abdomen is soft.      Tenderness: There is no abdominal tenderness. There is no guarding or rebound.   Skin:     General: Skin is warm and dry.      Coloration: Skin is not jaundiced or pale.   Neurological:      Mental Status: She is alert and oriented to person, place, and time. Mental status is at baseline.      Motor: No seizure activity.   Psychiatric:         Attention and Perception: Attention normal.         Behavior: Behavior is not agitated or aggressive. Behavior is cooperative.             Significant Labs: All pertinent labs within the past 24 hours have been reviewed.    Significant Imaging: I have reviewed all pertinent imaging results/findings within the past 24 hours.  X-Ray  Chest AP Portable 12/09/24: FINDINGS:   Right chest wall port catheter tip projects over the distal SVC.   The cardiomediastinal silhouette is not enlarged.  There is no pleural effusion.  The trachea is midline.  The lungs are symmetrically expanded bilaterally with coarse interstitial attenuation, accentuated by habitus..  No large focal consolidation seen.  There is no pneumothorax.  The osseous structures are remarkable for degenerative change..   Impression: Interstitial findings are accentuated by habitus, no large focal consolidation.   MRI Pelvis W WO Contrast 12/10/24: FINDINGS:   Bones: Diffusely intermediate bone marrow signal focal geographic area of abnormal signal with double line margin and thin peripheral enhancement noted in the left femoral neck.  No associated bone marrow edema.  Additional punctate focus of T2 hyperintense signal seen within the proximal femur shaft.  No fracture.   Right hip: Cartilage space is maintained.  Non arthrographic evaluation of the labrum is unremarkable.  No joint effusion.   Left hip: Cartilage space is maintained.  Non arthrographic evaluation of the labrum is unremarkable.  No joint effusion.   Pelvic joints: Sacroiliac joints and pubic symphysis are unremarkable.   Muscles/tendons: Mild tendinosis of the right gluteus medius muscle.  The remaining regional muscles and tendons appear unremarkable.  No evidence for trochanteric or iliopsoas bursitis.   Miscellaneous: Stranding involving the bilateral gluteal fat.  Subcutaneous stranding in fluid signal within the lower abdominal wall.  Findings are similar prior CT and could be related to prior injection sites.   Impression:  1. Focal osteonecrosis of left femoral neck.  Possible additional subcentimeter focus of osteonecrosis in the proximal left femoral shaft.   2. Diffuse intermediate bone marrow signal, related to sickle cell disease.   X-Ray Chest AP Portable 12/11/24: FINDINGS:   There is a right sided chest  port present with the tip of the catheter projecting near the junction of the superior vena cava and right atrium.  The heart and mediastinal structures are within normal limits in size.  There is a linear density within the left lung base laterally likely representing linear atelectasis or a fibrotic streak.  There are a few linear opacities within the right lung apex as well also likely representing linear atelectasis or fibrotic streaks.  There is no evidence for pneumothorax or pleural effusions.  Bony structures are grossly intact.   Impression:  Right chest port with tip of catheter near the junction of the superior vena cava and right atrium.   Linear densities within the right lung apex and left lung base likely representing scarring or linear atelectasis in this appears stable.   No acute chest disease identified.   X-Ray Chest 1 View 12/11/24: FINDINGS:   There is a right-sided port, unchanged.  The lungs are hypoexpanded.  There is continued chronic interstitial prominence in the bilateral lungs, stable.  There is no new focal consolidation.  The pleural spaces are clear.  The cardiac silhouette is enlarged, mild.  There are overlying leads.  Osseous structures are intact.

## 2024-12-13 PROBLEM — M87.059 AVASCULAR NECROSIS OF FEMUR: Chronic | Status: ACTIVE | Noted: 2024-12-10

## 2024-12-13 PROCEDURE — 99900035 HC TECH TIME PER 15 MIN (STAT)

## 2024-12-13 PROCEDURE — 25000003 PHARM REV CODE 250: Performed by: STUDENT IN AN ORGANIZED HEALTH CARE EDUCATION/TRAINING PROGRAM

## 2024-12-13 PROCEDURE — 11000001 HC ACUTE MED/SURG PRIVATE ROOM

## 2024-12-13 PROCEDURE — 94761 N-INVAS EAR/PLS OXIMETRY MLT: CPT

## 2024-12-13 PROCEDURE — 63600175 PHARM REV CODE 636 W HCPCS: Performed by: FAMILY MEDICINE

## 2024-12-13 PROCEDURE — 63600175 PHARM REV CODE 636 W HCPCS: Performed by: STUDENT IN AN ORGANIZED HEALTH CARE EDUCATION/TRAINING PROGRAM

## 2024-12-13 PROCEDURE — 27000221 HC OXYGEN, UP TO 24 HOURS

## 2024-12-13 PROCEDURE — 25000003 PHARM REV CODE 250: Performed by: FAMILY MEDICINE

## 2024-12-13 RX ADMIN — MORPHINE SULFATE 30 MG: 30 TABLET, FILM COATED, EXTENDED RELEASE ORAL at 09:12

## 2024-12-13 RX ADMIN — HYDROMORPHONE HYDROCHLORIDE 3 MG: 1 INJECTION, SOLUTION INTRAMUSCULAR; INTRAVENOUS; SUBCUTANEOUS at 05:12

## 2024-12-13 RX ADMIN — SUCRALFATE 1 G: 1 SUSPENSION ORAL at 05:12

## 2024-12-13 RX ADMIN — PROCHLORPERAZINE EDISYLATE 5 MG: 5 INJECTION INTRAMUSCULAR; INTRAVENOUS at 01:12

## 2024-12-13 RX ADMIN — DICYCLOMINE HYDROCHLORIDE 10 MG: 10 CAPSULE ORAL at 08:12

## 2024-12-13 RX ADMIN — FLUOXETINE HYDROCHLORIDE 40 MG: 20 CAPSULE ORAL at 09:12

## 2024-12-13 RX ADMIN — APIXABAN 5 MG: 5 TABLET, FILM COATED ORAL at 09:12

## 2024-12-13 RX ADMIN — MORPHINE SULFATE 30 MG: 30 TABLET, FILM COATED, EXTENDED RELEASE ORAL at 08:12

## 2024-12-13 RX ADMIN — HYDROMORPHONE HYDROCHLORIDE 3 MG: 1 INJECTION, SOLUTION INTRAMUSCULAR; INTRAVENOUS; SUBCUTANEOUS at 03:12

## 2024-12-13 RX ADMIN — METHOCARBAMOL 1500 MG: 750 TABLET ORAL at 03:12

## 2024-12-13 RX ADMIN — MUPIROCIN: 20 OINTMENT TOPICAL at 09:12

## 2024-12-13 RX ADMIN — OXYCODONE HYDROCHLORIDE 15 MG: 10 TABLET ORAL at 01:12

## 2024-12-13 RX ADMIN — ACETAMINOPHEN 1000 MG: 500 TABLET ORAL at 01:12

## 2024-12-13 RX ADMIN — METHOCARBAMOL 1500 MG: 750 TABLET ORAL at 09:12

## 2024-12-13 RX ADMIN — DICYCLOMINE HYDROCHLORIDE 10 MG: 10 CAPSULE ORAL at 06:12

## 2024-12-13 RX ADMIN — POLYETHYLENE GLYCOL 3350 17 G: 17 POWDER, FOR SOLUTION ORAL at 09:12

## 2024-12-13 RX ADMIN — FOLIC ACID 1 MG: 1 TABLET ORAL at 09:12

## 2024-12-13 RX ADMIN — HYDROMORPHONE HYDROCHLORIDE 3 MG: 1 INJECTION, SOLUTION INTRAMUSCULAR; INTRAVENOUS; SUBCUTANEOUS at 09:12

## 2024-12-13 RX ADMIN — DIPHENHYDRAMINE HYDROCHLORIDE 50 MG: 50 CAPSULE ORAL at 11:12

## 2024-12-13 RX ADMIN — DICYCLOMINE HYDROCHLORIDE 10 MG: 10 CAPSULE ORAL at 12:12

## 2024-12-13 RX ADMIN — SUCRALFATE 1 G: 1 SUSPENSION ORAL at 12:12

## 2024-12-13 RX ADMIN — HYDROMORPHONE HYDROCHLORIDE 3 MG: 1 INJECTION, SOLUTION INTRAMUSCULAR; INTRAVENOUS; SUBCUTANEOUS at 12:12

## 2024-12-13 RX ADMIN — HYDROMORPHONE HYDROCHLORIDE 3 MG: 1 INJECTION, SOLUTION INTRAMUSCULAR; INTRAVENOUS; SUBCUTANEOUS at 06:12

## 2024-12-13 RX ADMIN — GABAPENTIN 600 MG: 300 CAPSULE ORAL at 09:12

## 2024-12-13 RX ADMIN — ACETAMINOPHEN 1000 MG: 500 TABLET ORAL at 08:12

## 2024-12-13 RX ADMIN — METHOCARBAMOL 1500 MG: 750 TABLET ORAL at 08:12

## 2024-12-13 RX ADMIN — OXYCODONE HYDROCHLORIDE 15 MG: 10 TABLET ORAL at 08:12

## 2024-12-13 RX ADMIN — GABAPENTIN 600 MG: 300 CAPSULE ORAL at 03:12

## 2024-12-13 RX ADMIN — GABAPENTIN 600 MG: 300 CAPSULE ORAL at 08:12

## 2024-12-13 RX ADMIN — HYDROXYUREA 1000 MG: 500 CAPSULE ORAL at 09:12

## 2024-12-13 RX ADMIN — OXYCODONE HYDROCHLORIDE 15 MG: 10 TABLET ORAL at 11:12

## 2024-12-13 RX ADMIN — LIDOCAINE 1 PATCH: 50 PATCH CUTANEOUS at 12:12

## 2024-12-13 RX ADMIN — PROMETHAZINE HYDROCHLORIDE 25 MG: 12.5 TABLET ORAL at 09:12

## 2024-12-13 RX ADMIN — ACETAMINOPHEN 1000 MG: 500 TABLET ORAL at 05:12

## 2024-12-13 RX ADMIN — APIXABAN 5 MG: 5 TABLET, FILM COATED ORAL at 08:12

## 2024-12-13 RX ADMIN — SUCRALFATE 1 G: 1 SUSPENSION ORAL at 06:12

## 2024-12-13 RX ADMIN — DICYCLOMINE HYDROCHLORIDE 10 MG: 10 CAPSULE ORAL at 09:12

## 2024-12-13 RX ADMIN — PANTOPRAZOLE SODIUM 40 MG: 40 TABLET, DELAYED RELEASE ORAL at 09:12

## 2024-12-13 RX ADMIN — OXYCODONE HYDROCHLORIDE 15 MG: 10 TABLET ORAL at 10:12

## 2024-12-13 RX ADMIN — OXYCODONE HYDROCHLORIDE 15 MG: 10 TABLET ORAL at 04:12

## 2024-12-13 RX ADMIN — HYDROMORPHONE HYDROCHLORIDE 3 MG: 1 INJECTION, SOLUTION INTRAMUSCULAR; INTRAVENOUS; SUBCUTANEOUS at 08:12

## 2024-12-13 NOTE — PLAN OF CARE
LIZYW met with patient/family at bedside. Patient experience rounding completed and reviewed the following.     Do you know your discharge plan? Yes Home   If yes, what is the plan? (Home, Home Health, Rehab, SNF, LTAC, or Other)     Have you discussed your needs and preferences with your SW/CM? Yes     If you are discharging home, do you have help at home? Yes   Patient will have help at home.    Do you think you will need help additional at home at discharge?   No   Patient has no need for additional help at the moment.    Do you currently have difficulty keeping up with bills, affording medicine or buying food? No  Patient has no difficulty with bills, food, and medicine.    Assigned SW/CM notified of any patient/family needs or concerns. Appropriate resources provided to address patient's needs.    Radha CAMEJO, RSW  Case Management  [phone]

## 2024-12-13 NOTE — SUBJECTIVE & OBJECTIVE
Interval History: No acute events.    Review of Systems   Constitutional:  Negative for chills and fever.   Gastrointestinal:  Positive for abdominal pain and nausea.   Neurological:  Negative for seizures and syncope.     Objective:     Vital Signs (Most Recent):  Temp: 98.2 °F (36.8 °C) (12/13/24 1148)  Pulse: 87 (12/13/24 1148)  Resp: 17 (12/13/24 1324)  BP: (!) 143/73 (12/13/24 1148)  SpO2: 100 % (12/13/24 1148) Vital Signs (24h Range):  Temp:  [98.1 °F (36.7 °C)-99.1 °F (37.3 °C)] 98.2 °F (36.8 °C)  Pulse:  [] 87  Resp:  [16-20] 17  SpO2:  [83 %-100 %] 100 %  BP: (122-152)/(72-91) 143/73     Weight: 97.2 kg (214 lb 4.6 oz)  Body mass index is 39.19 kg/m².    Intake/Output Summary (Last 24 hours) at 12/13/2024 1326  Last data filed at 12/13/2024 0107  Gross per 24 hour   Intake 240 ml   Output 1500 ml   Net -1260 ml         Physical Exam  Vitals and nursing note reviewed.   Constitutional:       General: She is not in acute distress.     Appearance: She is well-developed. She is obese. She is not diaphoretic.      Interventions: She is not intubated.Nasal cannula in place.   Pulmonary:      Effort: Pulmonary effort is normal. No accessory muscle usage or respiratory distress. She is not intubated.   Abdominal:      Palpations: Abdomen is soft.      Tenderness: There is no abdominal tenderness. There is no guarding or rebound.   Skin:     General: Skin is warm and dry.      Coloration: Skin is not jaundiced or pale.   Neurological:      Mental Status: She is alert and oriented to person, place, and time. Mental status is at baseline.      Motor: No seizure activity.   Psychiatric:         Attention and Perception: Attention normal.         Behavior: Behavior is not agitated or aggressive. Behavior is cooperative.             Significant Labs: All pertinent labs within the past 24 hours have been reviewed.    Significant Imaging: I have reviewed all pertinent imaging results/findings within the past 24  hours.

## 2024-12-13 NOTE — ASSESSMENT & PLAN NOTE
Occurs with pain crises. Likely ischemia. Giving dicyclomine, prn medications. Get mesenteric ultrasound to evaluate.

## 2024-12-13 NOTE — PLAN OF CARE
Problem: Adult Inpatient Plan of Care  Goal: Plan of Care Review  Outcome: Progressing  Flowsheets (Taken 12/13/2024 0508)  Plan of Care Reviewed With: patient  Goal: Patient-Specific Goal (Individualized)  Outcome: Progressing  Flowsheets (Taken 12/13/2024 0508)  Individualized Care Needs: pain control  Anxieties, Fears or Concerns: pain control  Patient/Family-Specific Goals (Include Timeframe): keep pt and family updated with POC  Goal: Optimal Comfort and Wellbeing  Outcome: Progressing  Intervention: Monitor Pain and Promote Comfort  Flowsheets (Taken 12/13/2024 0508)  Pain Management Interventions:   around-the-clock dosing utilized   awakened for pain meds per patient request   care clustered   medication offered   pain management plan reviewed with patient/caregiver   pillow support provided   position adjusted   relaxation techniques promoted   quiet environment facilitated  Intervention: Provide Person-Centered Care  Flowsheets (Taken 12/13/2024 0508)  Trust Relationship/Rapport:   care explained   questions encouraged   reassurance provided   choices provided   emotional support provided   thoughts/feelings acknowledged   empathic listening provided   questions answered     Problem: Infection  Goal: Absence of Infection Signs and Symptoms  Outcome: Progressing  Intervention: Prevent or Manage Infection  Flowsheets (Taken 12/13/2024 0508)  Fever Reduction/Comfort Measures:   lightweight bedding   lightweight clothing  Infection Management: aseptic technique maintained  Isolation Precautions: precautions discontinued

## 2024-12-13 NOTE — PROGRESS NOTES
[facility]surgery ([facility])  [facility] Medicine  Progress Note    Patient Name: Nazanin Malone  MRN: [id number]  Patient Class: IP- Inpatient   Admission Date: 2024  Length of Stay: 3 days  Attending Physician: Brant Freed MD  Primary Care Provider: Pee Montgomery MD        Subjective     Principal Problem:Vaso-occlusive pain due to sickle cell disease        HPI:  Nazanin Malone is a 44 year old Black woman with sickle cell type S beta thalassemia, recurrent vaso-occlusive pain crises, chronic opioid use, avascular necrosis of the left femur, hypertension, asthma, trigeminal neuralgia, history of pulmonary embolism (anticoagulated on apixaban), history of rhabdomyolysis status post fasciotomy in , history of chronic gastritis and duodenitis on 2024, history of  section, history of tubal ligation, history of tonsillectomy. She lives in [facility]. She works at [employer].   She was hospitalized at [facility] from 2024 to 12/3/2024 for vaso-occlusive pain. She returned to [facility] Emergency Department on 2024. She reported feeling good for approximately 3 days. She started having non-bloody diarrhea, loose and occurring 2 to 3 times a day, 3 days prior to presentation that she attributed to gastroenteritis, with associated nausea. She denied sick contacts. Since then, she had less oral intake and noticed increasing lower extremity and hip pain as well as abdominal pain radiating into her chest, typical of vaso-occlusive pain crisis. She ran out of pain medications a day prior to presentation. She has not yet been prescribed hydroxyurea. She takes it when she is admitted to the hospital and has not had significant side effects from it. She had associated shortness of breath when pain was severe and intermittent headache that resolved when she was given pain medications in the emergency  department. She was given IV hydromorphone. She was admitted to Hospital Medicine Team B.    Overview/Hospital Course:  She reported severe abdominal pain with nausea and diarrhea has happened with prior pain crises as well. She had infectious GI workup in November that was negative. She had no diarrhea for 48 hours after admission. Stool studies were cancelled. She was put on IV hydromorphone 3 mg every 3 hours. She was given multimodal analgesic regimen with diphenhydramine, sucralfate, and additional medications as needed for supportive care. Loyd catheter was placed for urinary retention. She became hypoxic. IV fluids were held.     Interval History: No acute events.    Review of Systems   Constitutional:  Negative for chills and fever.   Gastrointestinal:  Positive for abdominal pain and nausea.   Neurological:  Negative for seizures and syncope.     Objective:     Vital Signs (Most Recent):  Temp: 98.2 °F (36.8 °C) (12/13/24 1148)  Pulse: 87 (12/13/24 1148)  Resp: 17 (12/13/24 1324)  BP: (!) 143/73 (12/13/24 1148)  SpO2: 100 % (12/13/24 1148) Vital Signs (24h Range):  Temp:  [98.1 °F (36.7 °C)-99.1 °F (37.3 °C)] 98.2 °F (36.8 °C)  Pulse:  [] 87  Resp:  [16-20] 17  SpO2:  [83 %-100 %] 100 %  BP: (122-152)/(72-91) 143/73     Weight: 97.2 kg (214 lb 4.6 oz)  Body mass index is 39.19 kg/m².    Intake/Output Summary (Last 24 hours) at 12/13/2024 1326  Last data filed at 12/13/2024 0107  Gross per 24 hour   Intake 240 ml   Output 1500 ml   Net -1260 ml         Physical Exam  Vitals and nursing note reviewed.   Constitutional:       General: She is not in acute distress.     Appearance: She is well-developed. She is obese. She is not diaphoretic.      Interventions: She is not intubated.Nasal cannula in place.   Pulmonary:      Effort: Pulmonary effort is normal. No accessory muscle usage or respiratory distress. She is not intubated.   Abdominal:      Palpations: Abdomen is soft.      Tenderness: There is no  abdominal tenderness. There is no guarding or rebound.   Skin:     General: Skin is warm and dry.      Coloration: Skin is not jaundiced or pale.   Neurological:      Mental Status: She is alert and oriented to person, place, and time. Mental status is at baseline.      Motor: No seizure activity.   Psychiatric:         Attention and Perception: Attention normal.         Behavior: Behavior is not agitated or aggressive. Behavior is cooperative.             Significant Labs: All pertinent labs within the past 24 hours have been reviewed.    Significant Imaging: I have reviewed all pertinent imaging results/findings within the past 24 hours.    Assessment and Plan     * Vaso-occlusive pain due to sickle cell disease  Avascular necrosis of femur  No cough, fever, consolidation, or leukocytosis. Stable on RA. No concern for acute chest at this time. Repeat CXR stable.   Cont home folic acid and hydroxyurea  Multimodal pain management with scheduled IV Dilaudid, oxy for breakthrough, scheduled MS Contin, gabapentin, Robaxin and Tylenol.  IV fluids held due to hypoxia  Avascular necrosis of femurs noted on prior imaging.     Urinary retention  Loyd placed 12/11/2024.     Diarrhea  See Chronic gastritis     Abdominal pain  See Chronic gastritis       Chronic gastritis  Occurs with pain crises. Likely ischemia. Giving dicyclomine, prn medications. Get mesenteric ultrasound to evaluate.       History of pulmonary embolism  Compliant with home Eliquis, cont    Anxiety and depression  Cont home Prozac and p.r.n. Vistaril    Obesity (BMI 30-39.9)  Body mass index is 39.19 kg/m². Morbid obesity complicates all aspects of disease management from diagnostic modalities to treatment.     Drug dependence  Ran out of pain meds day prior to admission.  Will need outpatient physicians to prescribe long-term pain meds.  Consider pain management.      VTE Risk Mitigation (From admission, onward)           Ordered     apixaban tablet 5 mg   2 times daily         12/09/24 2323     IP VTE HIGH RISK PATIENT  Once         12/09/24 2321     Place sequential compression device  Until discontinued         12/09/24 2321                    Discharge Planning   ALYSE: 12/15/2024     Code Status: DNR   Medical Readiness for Discharge Date:   Discharge Plan A: Home with family                        Brant Freed MD  Department of Hospital Medicine   [facility] - Neurosurgery ([facility])

## 2024-12-13 NOTE — PLAN OF CARE
[facility] (Hospital)  Discharge Reassessment    Primary Care Provider: Pee Montgomery MD    Expected Discharge Date: 12/15/2024    Reassessment (most recent)       Discharge Reassessment - 12/13/24 0804          Discharge Reassessment    Assessment Type Discharge Planning Reassessment (P)      Did the patient's condition or plan change since previous assessment? No (P)      Discharge Plan discussed with: Patient;Sibling (P)      Name(s) and Number(s) Brother Jack (P)      Communicated ALYSE with patient/caregiver No (P)      Discharge Plan A Home with family (P)      Discharge Plan B Home with family;Home Health (P)      DME Needed Upon Discharge  none (P)      Why the patient remains in the hospital Requires continued medical care (P)         Post-Acute Status    Post-Acute Authorization Other (P)      Other Status No Post-Acute Service Needs (P)                    Patient is not medically cleared:  Multimodal pain management with scheduled IV Dilaudid, oxy for breakthrough, scheduled MS Contin, gabapentin, Robaxin and Tylenol.  IV fluids held due to hypoxia     Anticipate discharge with outpatient pain management and home with family.     Discharge Plan A and Plan B have been determined by review of patient's clinical status, future medical and therapeutic needs, and coverage/benefits for post-acute care in coordination with multidisciplinary team members.    Radha Castillo, FAN  [facility]  [phone]

## 2024-12-14 PROCEDURE — 11000001 HC ACUTE MED/SURG PRIVATE ROOM

## 2024-12-14 PROCEDURE — 63600175 PHARM REV CODE 636 W HCPCS: Performed by: STUDENT IN AN ORGANIZED HEALTH CARE EDUCATION/TRAINING PROGRAM

## 2024-12-14 PROCEDURE — 25000003 PHARM REV CODE 250: Performed by: FAMILY MEDICINE

## 2024-12-14 PROCEDURE — 25000003 PHARM REV CODE 250: Performed by: STUDENT IN AN ORGANIZED HEALTH CARE EDUCATION/TRAINING PROGRAM

## 2024-12-14 PROCEDURE — 25000003 PHARM REV CODE 250: Performed by: HOSPITALIST

## 2024-12-14 RX ORDER — LORAZEPAM 0.5 MG/1
0.5 TABLET ORAL EVERY 8 HOURS PRN
Status: DISCONTINUED | OUTPATIENT
Start: 2024-12-14 | End: 2024-12-18 | Stop reason: HOSPADM

## 2024-12-14 RX ADMIN — ACETAMINOPHEN 1000 MG: 500 TABLET ORAL at 03:12

## 2024-12-14 RX ADMIN — HYDROMORPHONE HYDROCHLORIDE 3 MG: 1 INJECTION, SOLUTION INTRAMUSCULAR; INTRAVENOUS; SUBCUTANEOUS at 06:12

## 2024-12-14 RX ADMIN — DICYCLOMINE HYDROCHLORIDE 10 MG: 10 CAPSULE ORAL at 08:12

## 2024-12-14 RX ADMIN — SUCRALFATE 1 G: 1 SUSPENSION ORAL at 05:12

## 2024-12-14 RX ADMIN — PANTOPRAZOLE SODIUM 40 MG: 40 TABLET, DELAYED RELEASE ORAL at 10:12

## 2024-12-14 RX ADMIN — APIXABAN 5 MG: 5 TABLET, FILM COATED ORAL at 08:12

## 2024-12-14 RX ADMIN — FOLIC ACID 1 MG: 1 TABLET ORAL at 08:12

## 2024-12-14 RX ADMIN — MORPHINE SULFATE 30 MG: 30 TABLET, FILM COATED, EXTENDED RELEASE ORAL at 08:12

## 2024-12-14 RX ADMIN — LIDOCAINE 1 PATCH: 50 PATCH CUTANEOUS at 12:12

## 2024-12-14 RX ADMIN — HYDROXYZINE PAMOATE 50 MG: 25 CAPSULE ORAL at 12:12

## 2024-12-14 RX ADMIN — HYDROMORPHONE HYDROCHLORIDE 3 MG: 1 INJECTION, SOLUTION INTRAMUSCULAR; INTRAVENOUS; SUBCUTANEOUS at 05:12

## 2024-12-14 RX ADMIN — POLYETHYLENE GLYCOL 3350 17 G: 17 POWDER, FOR SOLUTION ORAL at 10:12

## 2024-12-14 RX ADMIN — FLUOXETINE HYDROCHLORIDE 40 MG: 20 CAPSULE ORAL at 08:12

## 2024-12-14 RX ADMIN — OXYCODONE HYDROCHLORIDE 15 MG: 10 TABLET ORAL at 10:12

## 2024-12-14 RX ADMIN — GABAPENTIN 600 MG: 300 CAPSULE ORAL at 03:12

## 2024-12-14 RX ADMIN — SUCRALFATE 1 G: 1 SUSPENSION ORAL at 12:12

## 2024-12-14 RX ADMIN — MUPIROCIN: 20 OINTMENT TOPICAL at 09:12

## 2024-12-14 RX ADMIN — HYDROXYZINE PAMOATE 50 MG: 25 CAPSULE ORAL at 05:12

## 2024-12-14 RX ADMIN — HYDROMORPHONE HYDROCHLORIDE 3 MG: 1 INJECTION, SOLUTION INTRAMUSCULAR; INTRAVENOUS; SUBCUTANEOUS at 12:12

## 2024-12-14 RX ADMIN — METHOCARBAMOL 1500 MG: 750 TABLET ORAL at 08:12

## 2024-12-14 RX ADMIN — HYDROMORPHONE HYDROCHLORIDE 3 MG: 1 INJECTION, SOLUTION INTRAMUSCULAR; INTRAVENOUS; SUBCUTANEOUS at 03:12

## 2024-12-14 RX ADMIN — DIPHENHYDRAMINE HYDROCHLORIDE 50 MG: 50 CAPSULE ORAL at 05:12

## 2024-12-14 RX ADMIN — MUPIROCIN: 20 OINTMENT TOPICAL at 08:12

## 2024-12-14 RX ADMIN — ACETAMINOPHEN 1000 MG: 500 TABLET ORAL at 08:12

## 2024-12-14 RX ADMIN — HYDROMORPHONE HYDROCHLORIDE 3 MG: 1 INJECTION, SOLUTION INTRAMUSCULAR; INTRAVENOUS; SUBCUTANEOUS at 08:12

## 2024-12-14 RX ADMIN — DICYCLOMINE HYDROCHLORIDE 10 MG: 10 CAPSULE ORAL at 12:12

## 2024-12-14 RX ADMIN — DICYCLOMINE HYDROCHLORIDE 10 MG: 10 CAPSULE ORAL at 05:12

## 2024-12-14 RX ADMIN — GABAPENTIN 600 MG: 300 CAPSULE ORAL at 08:12

## 2024-12-14 RX ADMIN — LORAZEPAM 0.5 MG: 0.5 TABLET ORAL at 03:12

## 2024-12-14 RX ADMIN — HYDROMORPHONE HYDROCHLORIDE 3 MG: 1 INJECTION, SOLUTION INTRAMUSCULAR; INTRAVENOUS; SUBCUTANEOUS at 09:12

## 2024-12-14 RX ADMIN — METHOCARBAMOL 1500 MG: 750 TABLET ORAL at 03:12

## 2024-12-14 RX ADMIN — DIPHENHYDRAMINE HYDROCHLORIDE 50 MG: 50 CAPSULE ORAL at 10:12

## 2024-12-14 RX ADMIN — ACETAMINOPHEN 1000 MG: 500 TABLET ORAL at 06:12

## 2024-12-14 RX ADMIN — Medication 6 MG: at 08:12

## 2024-12-14 RX ADMIN — SUCRALFATE 1 G: 1 SUSPENSION ORAL at 06:12

## 2024-12-14 RX ADMIN — HYDROXYUREA 1000 MG: 500 CAPSULE ORAL at 10:12

## 2024-12-14 RX ADMIN — DIPHENHYDRAMINE HYDROCHLORIDE 50 MG: 50 CAPSULE ORAL at 09:12

## 2024-12-14 NOTE — SUBJECTIVE & OBJECTIVE
Interval History: Abdomen hurt when ultrasound probe was pushing on it this morning. She has anxiety and had uncontrolled anxiety due to family calling her about her mother, who has Alzheimer's and who she normally takes care of when she is home. Ordered prn lorazepam.    Review of Systems   Constitutional:  Negative for chills and fever.   Gastrointestinal:  Positive for abdominal pain and nausea.   Neurological:  Negative for seizures and syncope.     Objective:     Vital Signs (Most Recent):  Temp: 97.5 °F (36.4 °C) (12/14/24 1623)  Pulse: 89 (12/14/24 1623)  Resp: 20 (12/14/24 1623)  BP: 132/80 (12/14/24 1623)  SpO2: 99 % (12/14/24 1623) Vital Signs (24h Range):  Temp:  [97.5 °F (36.4 °C)-98.8 °F (37.1 °C)] 97.5 °F (36.4 °C)  Pulse:  [] 89  Resp:  [16-22] 20  SpO2:  [95 %-100 %] 99 %  BP: (126-146)/(74-89) 132/80     Weight: 97.2 kg (214 lb 4.6 oz)  Body mass index is 39.19 kg/m².    Intake/Output Summary (Last 24 hours) at 12/14/2024 1635  Last data filed at 12/14/2024 0357  Gross per 24 hour   Intake --   Output 1050 ml   Net -1050 ml         Physical Exam  Vitals and nursing note reviewed.   Constitutional:       General: She is not in acute distress.     Appearance: She is well-developed. She is obese. She is not diaphoretic.      Interventions: She is not intubated.Nasal cannula in place.   Pulmonary:      Effort: Pulmonary effort is normal. No accessory muscle usage or respiratory distress. She is not intubated.   Abdominal:      Palpations: Abdomen is soft.      Tenderness: There is no abdominal tenderness. There is no guarding or rebound.   Skin:     General: Skin is warm and dry.      Coloration: Skin is not jaundiced or pale.   Neurological:      Mental Status: She is alert and oriented to person, place, and time. Mental status is at baseline.      Motor: No seizure activity.   Psychiatric:         Attention and Perception: Attention normal.         Behavior: Behavior is not agitated or  aggressive. Behavior is cooperative.             Significant Labs: All pertinent labs within the past 24 hours have been reviewed.    Significant Imaging: I have reviewed all pertinent imaging results/findings within the past 24 hours.  US Mesenteric Ischemia Study 12/14/24: FINDINGS:   There is no evidence of hemodynamically significant stenosis of either the celiac or superior mesenteric arteries.  The inferior mesenteric artery is not well visualized in this study.   Velocities (measurements in cm/sec):   Aorta: 81   Celiac expiratory: 172   SMA proximal: 221   SMA Mid: 262   SMA distal: 250   RITU: Not visualized   CA/AO ratio: 2.1   SMA/AO ratio: 3.2   Proximal aorta: 2.6 x 2.6 cm   Mid aorta: 2.1 x 2.0 cm   Distal aorta: 1.7 x 1.8 cm   Right and left iliac arteries measure 1.1 x 1.1 cm.   Impression:  No definite evidence of hemodynamically significant stenosis in the superior mesenteric artery.   Inferior mesenteric artery not visualized.  No evidence of median arcuate ligament compression syndrome.

## 2024-12-14 NOTE — ASSESSMENT & PLAN NOTE
Occurs with pain crises. Giving dicyclomine, prn medications. Mesenteric ultrasound does not show an obvious abnormality. Has not followed up with GI since her EGD last month. Consult GI.

## 2024-12-14 NOTE — PROGRESS NOTES
[facility] ([facility])  [facility] Medicine  Progress Note    Patient Name: Nazanin Malone  MRN: [id number]  Patient Class: IP- Inpatient   Admission Date: 2024  Length of Stay: 4 days  Attending Physician: Brant Freed MD  Primary Care Provider: Pee Montgomery MD        Subjective     Principal Problem:Vaso-occlusive pain due to sickle cell disease        HPI:  Nazanin Malone is a 44 year old Black woman with sickle cell type S beta thalassemia, recurrent vaso-occlusive pain crises, chronic opioid use, avascular necrosis of the left femur, hypertension, asthma, trigeminal neuralgia, history of pulmonary embolism (anticoagulated on apixaban), history of rhabdomyolysis status post fasciotomy in , history of chronic gastritis and duodenitis on 2024, history of  section, history of tubal ligation, history of tonsillectomy. She lives in [facility]. She works at [employer].   She was hospitalized at [facility] from 2024 to 12/3/2024 for vaso-occlusive pain. She returned to [facility] Emergency Department on 2024. She reported feeling good for approximately 3 days. She started having non-bloody diarrhea, loose and occurring 2 to 3 times a day, 3 days prior to presentation that she attributed to gastroenteritis, with associated nausea. She denied sick contacts. Since then, she had less oral intake and noticed increasing lower extremity and hip pain as well as abdominal pain radiating into her chest, typical of vaso-occlusive pain crisis. She ran out of pain medications a day prior to presentation. She has not yet been prescribed hydroxyurea. She takes it when she is admitted to the hospital and has not had significant side effects from it. She had associated shortness of breath when pain was severe and intermittent headache that resolved when she was given pain medications in the emergency  department. She was given IV hydromorphone. She was admitted to Hospital Medicine Team B.    Overview/Hospital Course:  She reported severe abdominal pain with nausea and diarrhea has happened with prior pain crises as well. She had infectious GI workup in November that showed chronic gastritis. She had no diarrhea for 48 hours after admission. Stool studies were cancelled. She was put on IV hydromorphone 3 mg every 3 hours. She was given multimodal analgesic regimen with diphenhydramine, sucralfate, and additional medications as needed for supportive care. Loyd catheter was placed for urinary retention. She became hypoxic. IV fluids were held. Ultrasound mesenteric ischemia study showed no evidence of hemodynamically significant stenosis of either the celiac or superior mesenteric arteries, but the inferior mesenteric artery was not well visualized.     Interval History: Abdomen hurt when ultrasound probe was pushing on it this morning. She has anxiety and had uncontrolled anxiety due to family calling her about her mother, who has Alzheimer's and who she normally takes care of when she is home. Ordered prn lorazepam.    Review of Systems   Constitutional:  Negative for chills and fever.   Gastrointestinal:  Positive for abdominal pain and nausea.   Neurological:  Negative for seizures and syncope.     Objective:     Vital Signs (Most Recent):  Temp: 97.5 °F (36.4 °C) (12/14/24 1623)  Pulse: 89 (12/14/24 1623)  Resp: 20 (12/14/24 1623)  BP: 132/80 (12/14/24 1623)  SpO2: 99 % (12/14/24 1623) Vital Signs (24h Range):  Temp:  [97.5 °F (36.4 °C)-98.8 °F (37.1 °C)] 97.5 °F (36.4 °C)  Pulse:  [] 89  Resp:  [16-22] 20  SpO2:  [95 %-100 %] 99 %  BP: (126-146)/(74-89) 132/80     Weight: 97.2 kg (214 lb 4.6 oz)  Body mass index is 39.19 kg/m².    Intake/Output Summary (Last 24 hours) at 12/14/2024 4594  Last data filed at 12/14/2024 8737  Gross per 24 hour   Intake --   Output 1050 ml   Net -1050 ml         Physical  Exam  Vitals and nursing note reviewed.   Constitutional:       General: She is not in acute distress.     Appearance: She is well-developed. She is obese. She is not diaphoretic.      Interventions: She is not intubated.Nasal cannula in place.   Pulmonary:      Effort: Pulmonary effort is normal. No accessory muscle usage or respiratory distress. She is not intubated.   Abdominal:      Palpations: Abdomen is soft.      Tenderness: There is no abdominal tenderness. There is no guarding or rebound.   Skin:     General: Skin is warm and dry.      Coloration: Skin is not jaundiced or pale.   Neurological:      Mental Status: She is alert and oriented to person, place, and time. Mental status is at baseline.      Motor: No seizure activity.   Psychiatric:         Attention and Perception: Attention normal.         Behavior: Behavior is not agitated or aggressive. Behavior is cooperative.             Significant Labs: All pertinent labs within the past 24 hours have been reviewed.    Significant Imaging: I have reviewed all pertinent imaging results/findings within the past 24 hours.  US Mesenteric Ischemia Study 12/14/24: FINDINGS:   There is no evidence of hemodynamically significant stenosis of either the celiac or superior mesenteric arteries.  The inferior mesenteric artery is not well visualized in this study.   Velocities (measurements in cm/sec):   Aorta: 81   Celiac expiratory: 172   SMA proximal: 221   SMA Mid: 262   SMA distal: 250   RITU: Not visualized   CA/AO ratio: 2.1   SMA/AO ratio: 3.2   Proximal aorta: 2.6 x 2.6 cm   Mid aorta: 2.1 x 2.0 cm   Distal aorta: 1.7 x 1.8 cm   Right and left iliac arteries measure 1.1 x 1.1 cm.   Impression:  No definite evidence of hemodynamically significant stenosis in the superior mesenteric artery.   Inferior mesenteric artery not visualized.  No evidence of median arcuate ligament compression syndrome.     Assessment and Plan     * Vaso-occlusive pain due to sickle cell  disease  Avascular necrosis of femur  No cough, fever, consolidation, or leukocytosis. Stable on RA. No concern for acute chest at this time. Repeat CXR stable.   Cont home folic acid and hydroxyurea  Multimodal pain management with scheduled IV Dilaudid, oxy for breakthrough, scheduled MS Contin, gabapentin, Robaxin and Tylenol.  IV fluids held due to hypoxia  Avascular necrosis of femurs noted on prior imaging.     Urinary retention  Loyd placed 12/11/2024.     Diarrhea  See Chronic gastritis     Abdominal pain  See Chronic gastritis       Chronic gastritis  Occurs with pain crises. Giving dicyclomine, prn medications. Mesenteric ultrasound does not show an obvious abnormality. Has not followed up with GI since her EGD last month. Consult GI.     History of pulmonary embolism  Compliant with home Eliquis, cont    Anxiety and depression  Cont home Prozac and p.r.n. Vistaril    Obesity (BMI 30-39.9)  Body mass index is 39.19 kg/m². Morbid obesity complicates all aspects of disease management from diagnostic modalities to treatment.     Drug dependence  Ran out of pain meds day prior to admission.  Will need outpatient physicians to prescribe long-term pain meds.  Consider pain management.      VTE Risk Mitigation (From admission, onward)           Ordered     apixaban tablet 5 mg  2 times daily         12/09/24 2323     IP VTE HIGH RISK PATIENT  Once         12/09/24 2321     Place sequential compression device  Until discontinued         12/09/24 2321                    Discharge Planning   ALYSE: 12/15/2024     Code Status: DNR   Medical Readiness for Discharge Date:   Discharge Plan A: Home with family                        Brant Freed MD  Department of Hospital Medicine   [facility] - Neurosurgery ([facility])

## 2024-12-15 PROCEDURE — 25000003 PHARM REV CODE 250: Performed by: FAMILY MEDICINE

## 2024-12-15 PROCEDURE — 25000003 PHARM REV CODE 250: Performed by: HOSPITALIST

## 2024-12-15 PROCEDURE — 63600175 PHARM REV CODE 636 W HCPCS: Performed by: STUDENT IN AN ORGANIZED HEALTH CARE EDUCATION/TRAINING PROGRAM

## 2024-12-15 PROCEDURE — 11000001 HC ACUTE MED/SURG PRIVATE ROOM

## 2024-12-15 PROCEDURE — 25000003 PHARM REV CODE 250: Performed by: STUDENT IN AN ORGANIZED HEALTH CARE EDUCATION/TRAINING PROGRAM

## 2024-12-15 PROCEDURE — 63600175 PHARM REV CODE 636 W HCPCS: Performed by: HOSPITALIST

## 2024-12-15 PROCEDURE — 99222 1ST HOSP IP/OBS MODERATE 55: CPT | Mod: ,,, | Performed by: INTERNAL MEDICINE

## 2024-12-15 RX ORDER — DIPHENHYDRAMINE HYDROCHLORIDE 50 MG/ML
50 INJECTION INTRAMUSCULAR; INTRAVENOUS EVERY 4 HOURS PRN
Status: DISCONTINUED | OUTPATIENT
Start: 2024-12-15 | End: 2024-12-18 | Stop reason: HOSPADM

## 2024-12-15 RX ORDER — FAMOTIDINE 20 MG/1
40 TABLET, FILM COATED ORAL NIGHTLY PRN
Status: DISCONTINUED | OUTPATIENT
Start: 2024-12-15 | End: 2024-12-18 | Stop reason: HOSPADM

## 2024-12-15 RX ORDER — PANTOPRAZOLE SODIUM 40 MG/1
40 TABLET, DELAYED RELEASE ORAL
Status: DISCONTINUED | OUTPATIENT
Start: 2024-12-15 | End: 2024-12-18 | Stop reason: HOSPADM

## 2024-12-15 RX ORDER — FAMOTIDINE 20 MG/1
40 TABLET, FILM COATED ORAL NIGHTLY
Status: DISCONTINUED | OUTPATIENT
Start: 2024-12-15 | End: 2024-12-15

## 2024-12-15 RX ADMIN — PANTOPRAZOLE SODIUM 40 MG: 40 TABLET, DELAYED RELEASE ORAL at 09:12

## 2024-12-15 RX ADMIN — HYDROMORPHONE HYDROCHLORIDE 3 MG: 1 INJECTION, SOLUTION INTRAMUSCULAR; INTRAVENOUS; SUBCUTANEOUS at 02:12

## 2024-12-15 RX ADMIN — SUCRALFATE 1 G: 1 SUSPENSION ORAL at 05:12

## 2024-12-15 RX ADMIN — GABAPENTIN 600 MG: 300 CAPSULE ORAL at 09:12

## 2024-12-15 RX ADMIN — MORPHINE SULFATE 30 MG: 30 TABLET, FILM COATED, EXTENDED RELEASE ORAL at 09:12

## 2024-12-15 RX ADMIN — GABAPENTIN 600 MG: 300 CAPSULE ORAL at 02:12

## 2024-12-15 RX ADMIN — APIXABAN 5 MG: 5 TABLET, FILM COATED ORAL at 09:12

## 2024-12-15 RX ADMIN — DICYCLOMINE HYDROCHLORIDE 10 MG: 10 CAPSULE ORAL at 12:12

## 2024-12-15 RX ADMIN — HYDROXYZINE PAMOATE 50 MG: 25 CAPSULE ORAL at 12:12

## 2024-12-15 RX ADMIN — DICYCLOMINE HYDROCHLORIDE 10 MG: 10 CAPSULE ORAL at 09:12

## 2024-12-15 RX ADMIN — OXYCODONE HYDROCHLORIDE 15 MG: 10 TABLET ORAL at 07:12

## 2024-12-15 RX ADMIN — POLYETHYLENE GLYCOL 3350 17 G: 17 POWDER, FOR SOLUTION ORAL at 09:12

## 2024-12-15 RX ADMIN — DIPHENHYDRAMINE HYDROCHLORIDE 50 MG: 50 INJECTION, SOLUTION INTRAMUSCULAR; INTRAVENOUS at 06:12

## 2024-12-15 RX ADMIN — DIPHENHYDRAMINE HYDROCHLORIDE 50 MG: 50 CAPSULE ORAL at 09:12

## 2024-12-15 RX ADMIN — HYDROMORPHONE HYDROCHLORIDE 3 MG: 1 INJECTION, SOLUTION INTRAMUSCULAR; INTRAVENOUS; SUBCUTANEOUS at 09:12

## 2024-12-15 RX ADMIN — APIXABAN 5 MG: 5 TABLET, FILM COATED ORAL at 08:12

## 2024-12-15 RX ADMIN — HYDROMORPHONE HYDROCHLORIDE 3 MG: 1 INJECTION, SOLUTION INTRAMUSCULAR; INTRAVENOUS; SUBCUTANEOUS at 08:12

## 2024-12-15 RX ADMIN — LORAZEPAM 0.5 MG: 0.5 TABLET ORAL at 09:12

## 2024-12-15 RX ADMIN — MUPIROCIN: 20 OINTMENT TOPICAL at 09:12

## 2024-12-15 RX ADMIN — MORPHINE SULFATE 30 MG: 30 TABLET, FILM COATED, EXTENDED RELEASE ORAL at 08:12

## 2024-12-15 RX ADMIN — DIPHENHYDRAMINE HYDROCHLORIDE 50 MG: 50 INJECTION, SOLUTION INTRAMUSCULAR; INTRAVENOUS at 10:12

## 2024-12-15 RX ADMIN — FOLIC ACID 1 MG: 1 TABLET ORAL at 09:12

## 2024-12-15 RX ADMIN — PROMETHAZINE HYDROCHLORIDE 25 MG: 12.5 TABLET ORAL at 04:12

## 2024-12-15 RX ADMIN — HYDROMORPHONE HYDROCHLORIDE 3 MG: 1 INJECTION, SOLUTION INTRAMUSCULAR; INTRAVENOUS; SUBCUTANEOUS at 05:12

## 2024-12-15 RX ADMIN — DICYCLOMINE HYDROCHLORIDE 10 MG: 10 CAPSULE ORAL at 08:12

## 2024-12-15 RX ADMIN — LORAZEPAM 0.5 MG: 0.5 TABLET ORAL at 05:12

## 2024-12-15 RX ADMIN — SUCRALFATE 1 G: 1 SUSPENSION ORAL at 12:12

## 2024-12-15 RX ADMIN — DIPHENHYDRAMINE HYDROCHLORIDE 50 MG: 50 INJECTION, SOLUTION INTRAMUSCULAR; INTRAVENOUS at 02:12

## 2024-12-15 RX ADMIN — METHOCARBAMOL 1500 MG: 750 TABLET ORAL at 09:12

## 2024-12-15 RX ADMIN — METHOCARBAMOL 1500 MG: 750 TABLET ORAL at 02:12

## 2024-12-15 RX ADMIN — ACETAMINOPHEN 1000 MG: 500 TABLET ORAL at 05:12

## 2024-12-15 RX ADMIN — DICYCLOMINE HYDROCHLORIDE 10 MG: 10 CAPSULE ORAL at 04:12

## 2024-12-15 RX ADMIN — METHOCARBAMOL 1500 MG: 750 TABLET ORAL at 08:12

## 2024-12-15 RX ADMIN — FLUOXETINE HYDROCHLORIDE 40 MG: 20 CAPSULE ORAL at 09:12

## 2024-12-15 RX ADMIN — HYDROMORPHONE HYDROCHLORIDE 3 MG: 1 INJECTION, SOLUTION INTRAMUSCULAR; INTRAVENOUS; SUBCUTANEOUS at 12:12

## 2024-12-15 RX ADMIN — HYDROMORPHONE HYDROCHLORIDE 3 MG: 1 INJECTION, SOLUTION INTRAMUSCULAR; INTRAVENOUS; SUBCUTANEOUS at 06:12

## 2024-12-15 RX ADMIN — GABAPENTIN 600 MG: 300 CAPSULE ORAL at 08:12

## 2024-12-15 RX ADMIN — HYDROXYUREA 1000 MG: 500 CAPSULE ORAL at 09:12

## 2024-12-15 RX ADMIN — OXYCODONE HYDROCHLORIDE 15 MG: 10 TABLET ORAL at 05:12

## 2024-12-15 RX ADMIN — PANTOPRAZOLE SODIUM 40 MG: 40 TABLET, DELAYED RELEASE ORAL at 05:12

## 2024-12-15 RX ADMIN — SUCRALFATE 1 G: 1 SUSPENSION ORAL at 06:12

## 2024-12-15 RX ADMIN — ACETAMINOPHEN 1000 MG: 500 TABLET ORAL at 08:12

## 2024-12-15 RX ADMIN — ACETAMINOPHEN 1000 MG: 500 TABLET ORAL at 02:12

## 2024-12-15 NOTE — CONSULTS
[facility]  Gastroenterology  Consult Note    Patient Name: Nazanin Malone  MRN: [id number]  Admission Date: 2024  Hospital Length of Stay: 5 days  Code Status: DNR   Attending Provider: Dr. Jaramillo  Consulting Provider: Sara Harvey MD  Primary Care Physician: Pee Montgomery MD  Principal Problem:Vaso-occlusive pain due to sickle cell disease    Inpatient consult to Gastroenterology  Consult performed by: Sara Harvey MD  Consult ordered by: Brant Freed MD        Subjective:     HPI: Nazanin Malone is a 44 y.o. female with history of   sickle cell type S beta thalassemia, recurrent vaso-occlusive pain crises, chronic opioid use, avascular necrosis of the left femur, hypertension, asthma, trigeminal neuralgia, history of pulmonary embolism (anticoagulated on apixaban), history of rhabdomyolysis status post fasciotomy in , history of chronic gastritis and duodenitis on 2024, history of  section, history of tubal ligation, history of tonsillectomy, presenting with severe abdominal pain, nausea and vomiting, and loose stools. GI consulted for the same.   She reports significant pain starting on Tuesday last week with large amounts of nonbloody nonbilious emesis. Food and even liquids make her pain worse. On antiemetics and ppi daily, bentyl and sucralfate added and have improved pain but not completely resolved it. US mesenteric dopplers done which were largely unremarkable. No melena or hematochezia, no hematemesis.       ROS     Objective:     Vitals:    12/15/24 1100   BP: 132/69   Pulse: 81   Resp: 18   Temp: 97.9 °F (36.6 °C)         Constitutional:  not in acute distress and well developed  HENT: Head: Normal, normocephalic, atraumatic.  Eyes: conjunctiva clear and sclera nonicteric  GI: soft, nondistended, tenderness moderate in the epigastrium, and without guarding  Skin: normal color  Neurological: alert, oriented x3    Significant  Labs:  Reviewed the following pertinent laboratory tests: cbc, cmp. Notable for hb 8.5, albumin 3.2.    Significant Imaging:  US Mesenteric doppler  Impression:     No definite evidence of hemodynamically significant stenosis in the superior mesenteric artery.     Inferior mesenteric artery not visualized.  No evidence of median arcuate ligament compression syndrome.         Assessment/Plan:     Nazanin Malone is a 44 y.o. female with history of sickle cell type S beta thalassemia, recurrent vaso-occlusive pain crises, chronic opioid use, avascular necrosis of the left femur, hypertension, asthma, trigeminal neuralgia, history of pulmonary embolism (anticoagulated on apixaban), history of rhabdomyolysis status post fasciotomy in , history of chronic gastritis and duodenitis on 2024, history of  section, history of tubal ligation, history of tonsillectomy, presenting with severe abdominal pain, nausea and vomiting, and loose stools. GI consulted for the same.    Problem List:  Abdominal pain, epigastric, worse postprandial  Nausea and vomiting  Loose stools      Recommendations:  - can increase ppi to twice daily, 1 hour before meals  - can add prn famotidine qhs  - agree with sucralfate 4x/day  - agree with dicycylomine 4x/day  - npo midnight for reeval in the AM   - obtain lactic acid x1    Thank you for involving us in the care of Nazanin Malone. Please call with any additional questions, concerns or changes in the patient's clinical status. We will continue to follow.     Sara Harvey MD  Gastroenterology and Hepatology Fellow, PGY-4

## 2024-12-15 NOTE — PROGRESS NOTES
[facility] ([facility])  [facility]  Progress Note    Patient Name: Nazanin Malone  MRN: [id number]  Patient Class: IP- Inpatient   Admission Date: 2024  Length of Stay: 5 days  Attending Physician: Brant Freed MD  Primary Care Provider: Pee Montgomery MD        Subjective     Principal Problem:Vaso-occlusive pain due to sickle cell disease        HPI:  Nazanin Malone is a 44 year old Black woman with sickle cell type S beta thalassemia, recurrent vaso-occlusive pain crises, chronic opioid use, avascular necrosis of the left femur, hypertension, asthma, trigeminal neuralgia, history of pulmonary embolism (anticoagulated on apixaban), history of rhabdomyolysis status post fasciotomy in , history of chronic gastritis and duodenitis on 2024, history of  section, history of tubal ligation, history of tonsillectomy. She lives in [facility]. She works at [employer].   She was hospitalized at [facility] from 2024 to 12/3/2024 for vaso-occlusive pain. She returned to [facility] Emergency Department on 2024. She reported feeling good for approximately 3 days. She started having non-bloody diarrhea, loose and occurring 2 to 3 times a day, 3 days prior to presentation that she attributed to gastroenteritis, with associated nausea. She denied sick contacts. Since then, she had less oral intake and noticed increasing lower extremity and hip pain as well as abdominal pain radiating into her chest, typical of vaso-occlusive pain crisis. She ran out of pain medications a day prior to presentation. She has not yet been prescribed hydroxyurea. She takes it when she is admitted to the hospital and has not had significant side effects from it. She had associated shortness of breath when pain was severe and intermittent headache that resolved when she was given pain medications in the emergency  department. She was given IV hydromorphone. She was admitted to Hospital Medicine Team B.    Overview/Hospital Course:  She reported severe abdominal pain with nausea and diarrhea has happened with prior pain crises as well. She had infectious GI workup in November that showed chronic gastritis. She had no diarrhea for 48 hours after admission. Stool studies were cancelled. She was put on IV hydromorphone 3 mg every 3 hours. She was given multimodal analgesic regimen with diphenhydramine, sucralfate, and additional medications as needed for supportive care. Loyd catheter was placed for urinary retention. She became hypoxic. IV fluids were held. Ultrasound mesenteric ischemia study showed no evidence of hemodynamically significant stenosis of either the celiac or superior mesenteric arteries, but the inferior mesenteric artery was not well visualized. Gastroenterology was consulted. They recommended increasing pantoprazole to twice daily, adding famotidine nightly, and checking H pylori serology.     Interval History: GI recommended increasing PPI to BID, adding famotidine prn, checking H pylori serology. She requests PO Benadryl change to IV due to faster acting as IV.     Review of Systems   Constitutional:  Negative for chills and fever.   Gastrointestinal:  Positive for abdominal pain and nausea.   Neurological:  Negative for seizures and syncope.     Objective:     Vital Signs (Most Recent):  Temp: 97.9 °F (36.6 °C) (12/15/24 1100)  Pulse: 81 (12/15/24 1100)  Resp: 18 (12/15/24 1214)  BP: 132/69 (12/15/24 1100)  SpO2: 100 % (12/15/24 1100) Vital Signs (24h Range):  Temp:  [97.5 °F (36.4 °C)-98.7 °F (37.1 °C)] 97.9 °F (36.6 °C)  Pulse:  [76-92] 81  Resp:  [16-20] 18  SpO2:  [97 %-100 %] 100 %  BP: (132-155)/(69-95) 132/69     Weight: 97.2 kg (214 lb 4.6 oz)  Body mass index is 39.19 kg/m².    Intake/Output Summary (Last 24 hours) at 12/15/2024 3962  Last data filed at 12/15/2024 0554  Gross per 24 hour   Intake  --   Output 1600 ml   Net -1600 ml         Physical Exam  Vitals and nursing note reviewed.   Constitutional:       General: She is not in acute distress.     Appearance: She is well-developed. She is obese. She is not diaphoretic.      Interventions: She is not intubated.Nasal cannula in place.   Pulmonary:      Effort: Pulmonary effort is normal. No accessory muscle usage or respiratory distress. She is not intubated.   Abdominal:      Palpations: Abdomen is soft.      Tenderness: There is no abdominal tenderness. There is no guarding or rebound.   Skin:     General: Skin is warm and dry.      Coloration: Skin is not jaundiced or pale.   Neurological:      Mental Status: She is alert and oriented to person, place, and time. Mental status is at baseline.      Motor: No seizure activity.   Psychiatric:         Attention and Perception: Attention normal.         Behavior: Behavior is not agitated or aggressive. Behavior is cooperative.             Significant Labs: All pertinent labs within the past 24 hours have been reviewed.    Significant Imaging: I have reviewed all pertinent imaging results/findings within the past 24 hours.    Assessment and Plan     * Vaso-occlusive pain due to sickle cell disease  Avascular necrosis of femur  No cough, fever, consolidation, or leukocytosis. Stable on RA. No concern for acute chest at this time. Repeat CXR stable.   Cont home folic acid and hydroxyurea  Multimodal pain management with scheduled IV Dilaudid, oxy for breakthrough, scheduled MS Contin, gabapentin, Robaxin and Tylenol.  IV fluids held due to hypoxia  Avascular necrosis of femurs noted on prior imaging.     Urinary retention  Loyd placed 12/11/2024.     Diarrhea  See Chronic gastritis     Abdominal pain  See Chronic gastritis       Chronic gastritis  Occurs with pain crises. Added sucralfate. Continue home pantoprazole, dicyclomine. giving prn medications. Mesenteric ultrasound does not show an obvious  abnormality. Has not followed up with GI since her EGD last month. Consulted GI and appreciate recommendations. Increase home pantoprazole to BID, add famotidine prn.    History of pulmonary embolism  Compliant with home Eliquis, cont    Anxiety and depression  Cont home Prozac and p.r.n. Vistaril    Obesity (BMI 30-39.9)  Body mass index is 39.19 kg/m². Morbid obesity complicates all aspects of disease management from diagnostic modalities to treatment.     Drug dependence  Ran out of pain meds day prior to admission.  Will need outpatient physicians to prescribe long-term pain meds.  Consider pain management.      VTE Risk Mitigation (From admission, onward)           Ordered     apixaban tablet 5 mg  2 times daily         12/09/24 2323     IP VTE HIGH RISK PATIENT  Once         12/09/24 2321     Place sequential compression device  Until discontinued         12/09/24 2321                    Discharge Planning   ALYSE: 12/17/2024     Code Status: DNR   Medical Readiness for Discharge Date:   Discharge Plan A: Home with family                        Brant Freed MD  Department of Hospital Medicine   [facility] ([facility])

## 2024-12-15 NOTE — SUBJECTIVE & OBJECTIVE
Interval History: GI recommended increasing PPI to BID, adding famotidine prn, checking H pylori serology. She requests PO Benadryl change to IV due to faster acting as IV.     Review of Systems   Constitutional:  Negative for chills and fever.   Gastrointestinal:  Positive for abdominal pain and nausea.   Neurological:  Negative for seizures and syncope.     Objective:     Vital Signs (Most Recent):  Temp: 97.9 °F (36.6 °C) (12/15/24 1100)  Pulse: 81 (12/15/24 1100)  Resp: 18 (12/15/24 1214)  BP: 132/69 (12/15/24 1100)  SpO2: 100 % (12/15/24 1100) Vital Signs (24h Range):  Temp:  [97.5 °F (36.4 °C)-98.7 °F (37.1 °C)] 97.9 °F (36.6 °C)  Pulse:  [76-92] 81  Resp:  [16-20] 18  SpO2:  [97 %-100 %] 100 %  BP: (132-155)/(69-95) 132/69     Weight: 97.2 kg (214 lb 4.6 oz)  Body mass index is 39.19 kg/m².    Intake/Output Summary (Last 24 hours) at 12/15/2024 1433  Last data filed at 12/15/2024 0554  Gross per 24 hour   Intake --   Output 1600 ml   Net -1600 ml         Physical Exam  Vitals and nursing note reviewed.   Constitutional:       General: She is not in acute distress.     Appearance: She is well-developed. She is obese. She is not diaphoretic.      Interventions: She is not intubated.Nasal cannula in place.   Pulmonary:      Effort: Pulmonary effort is normal. No accessory muscle usage or respiratory distress. She is not intubated.   Abdominal:      Palpations: Abdomen is soft.      Tenderness: There is no abdominal tenderness. There is no guarding or rebound.   Skin:     General: Skin is warm and dry.      Coloration: Skin is not jaundiced or pale.   Neurological:      Mental Status: She is alert and oriented to person, place, and time. Mental status is at baseline.      Motor: No seizure activity.   Psychiatric:         Attention and Perception: Attention normal.         Behavior: Behavior is not agitated or aggressive. Behavior is cooperative.             Significant Labs: All pertinent labs within the past 24  hours have been reviewed.    Significant Imaging: I have reviewed all pertinent imaging results/findings within the past 24 hours.

## 2024-12-15 NOTE — ASSESSMENT & PLAN NOTE
Occurs with pain crises. Added sucralfate. Continue home pantoprazole, dicyclomine. giving prn medications. Mesenteric ultrasound does not show an obvious abnormality. Has not followed up with GI since her EGD last month. Consulted GI and appreciate recommendations. Increase home pantoprazole to BID, add famotidine prn.

## 2024-12-15 NOTE — PLAN OF CARE
Patient went to US this AM. Administered all meds as scheduled. Had an episode of anxiety and obtained new order for PRN Lorazepam, obtained relief. No BM today but will increase fluid intake and ambulation along with med regimen to promote BM soon.    Problem: Adult Inpatient Plan of Care  Goal: Plan of Care Review  Outcome: Progressing  Goal: Patient-Specific Goal (Individualized)  Outcome: Progressing  Goal: Absence of Hospital-Acquired Illness or Injury  Outcome: Progressing  Goal: Optimal Comfort and Wellbeing  Outcome: Progressing  Goal: Readiness for Transition of Care  Outcome: Progressing     Problem: Infection  Goal: Absence of Infection Signs and Symptoms  Outcome: Progressing

## 2024-12-16 PROCEDURE — 25000003 PHARM REV CODE 250: Performed by: FAMILY MEDICINE

## 2024-12-16 PROCEDURE — 25000003 PHARM REV CODE 250: Performed by: STUDENT IN AN ORGANIZED HEALTH CARE EDUCATION/TRAINING PROGRAM

## 2024-12-16 PROCEDURE — 63600175 PHARM REV CODE 636 W HCPCS: Performed by: FAMILY MEDICINE

## 2024-12-16 PROCEDURE — 63600175 PHARM REV CODE 636 W HCPCS: Performed by: STUDENT IN AN ORGANIZED HEALTH CARE EDUCATION/TRAINING PROGRAM

## 2024-12-16 PROCEDURE — 63600175 PHARM REV CODE 636 W HCPCS: Performed by: HOSPITALIST

## 2024-12-16 PROCEDURE — 11000001 HC ACUTE MED/SURG PRIVATE ROOM

## 2024-12-16 PROCEDURE — 25000003 PHARM REV CODE 250: Performed by: HOSPITALIST

## 2024-12-16 RX ORDER — AMLODIPINE BESYLATE 10 MG/1
10 TABLET ORAL DAILY
Status: DISCONTINUED | OUTPATIENT
Start: 2024-12-16 | End: 2024-12-18 | Stop reason: HOSPADM

## 2024-12-16 RX ADMIN — PANTOPRAZOLE SODIUM 40 MG: 40 TABLET, DELAYED RELEASE ORAL at 04:12

## 2024-12-16 RX ADMIN — DICYCLOMINE HYDROCHLORIDE 10 MG: 10 CAPSULE ORAL at 01:12

## 2024-12-16 RX ADMIN — LORAZEPAM 0.5 MG: 0.5 TABLET ORAL at 03:12

## 2024-12-16 RX ADMIN — DICYCLOMINE HYDROCHLORIDE 10 MG: 10 CAPSULE ORAL at 08:12

## 2024-12-16 RX ADMIN — FOLIC ACID 1 MG: 1 TABLET ORAL at 08:12

## 2024-12-16 RX ADMIN — DIPHENHYDRAMINE HYDROCHLORIDE 50 MG: 50 INJECTION, SOLUTION INTRAMUSCULAR; INTRAVENOUS at 05:12

## 2024-12-16 RX ADMIN — HYDROMORPHONE HYDROCHLORIDE 3 MG: 1 INJECTION, SOLUTION INTRAMUSCULAR; INTRAVENOUS; SUBCUTANEOUS at 11:12

## 2024-12-16 RX ADMIN — DIPHENHYDRAMINE HYDROCHLORIDE 50 MG: 50 INJECTION, SOLUTION INTRAMUSCULAR; INTRAVENOUS at 07:12

## 2024-12-16 RX ADMIN — HYDROMORPHONE HYDROCHLORIDE 3 MG: 1 INJECTION, SOLUTION INTRAMUSCULAR; INTRAVENOUS; SUBCUTANEOUS at 03:12

## 2024-12-16 RX ADMIN — GABAPENTIN 600 MG: 300 CAPSULE ORAL at 08:12

## 2024-12-16 RX ADMIN — MORPHINE SULFATE 30 MG: 30 TABLET, FILM COATED, EXTENDED RELEASE ORAL at 08:12

## 2024-12-16 RX ADMIN — ACETAMINOPHEN 1000 MG: 500 TABLET ORAL at 10:12

## 2024-12-16 RX ADMIN — HYDROMORPHONE HYDROCHLORIDE 3 MG: 1 INJECTION, SOLUTION INTRAMUSCULAR; INTRAVENOUS; SUBCUTANEOUS at 08:12

## 2024-12-16 RX ADMIN — GABAPENTIN 600 MG: 300 CAPSULE ORAL at 03:12

## 2024-12-16 RX ADMIN — SUCRALFATE 1 G: 1 SUSPENSION ORAL at 06:12

## 2024-12-16 RX ADMIN — FLUOXETINE HYDROCHLORIDE 40 MG: 20 CAPSULE ORAL at 08:12

## 2024-12-16 RX ADMIN — HYDROMORPHONE HYDROCHLORIDE 3 MG: 1 INJECTION, SOLUTION INTRAMUSCULAR; INTRAVENOUS; SUBCUTANEOUS at 06:12

## 2024-12-16 RX ADMIN — DIPHENHYDRAMINE HYDROCHLORIDE 50 MG: 50 INJECTION, SOLUTION INTRAMUSCULAR; INTRAVENOUS at 11:12

## 2024-12-16 RX ADMIN — PANTOPRAZOLE SODIUM 40 MG: 40 TABLET, DELAYED RELEASE ORAL at 06:12

## 2024-12-16 RX ADMIN — DICYCLOMINE HYDROCHLORIDE 10 MG: 10 CAPSULE ORAL at 05:12

## 2024-12-16 RX ADMIN — HYDROMORPHONE HYDROCHLORIDE 3 MG: 1 INJECTION, SOLUTION INTRAMUSCULAR; INTRAVENOUS; SUBCUTANEOUS at 12:12

## 2024-12-16 RX ADMIN — APIXABAN 5 MG: 5 TABLET, FILM COATED ORAL at 08:12

## 2024-12-16 RX ADMIN — HYDROMORPHONE HYDROCHLORIDE 3 MG: 1 INJECTION, SOLUTION INTRAMUSCULAR; INTRAVENOUS; SUBCUTANEOUS at 05:12

## 2024-12-16 RX ADMIN — ACETAMINOPHEN 1000 MG: 500 TABLET ORAL at 06:12

## 2024-12-16 RX ADMIN — DIPHENHYDRAMINE HYDROCHLORIDE 50 MG: 50 INJECTION, SOLUTION INTRAMUSCULAR; INTRAVENOUS at 01:12

## 2024-12-16 RX ADMIN — SUCRALFATE 1 G: 1 SUSPENSION ORAL at 11:12

## 2024-12-16 RX ADMIN — SUCRALFATE 1 G: 1 SUSPENSION ORAL at 12:12

## 2024-12-16 RX ADMIN — AMLODIPINE BESYLATE 10 MG: 10 TABLET ORAL at 06:12

## 2024-12-16 RX ADMIN — HYDROXYUREA 1000 MG: 500 CAPSULE ORAL at 08:12

## 2024-12-16 RX ADMIN — PROCHLORPERAZINE EDISYLATE 5 MG: 5 INJECTION INTRAMUSCULAR; INTRAVENOUS at 07:12

## 2024-12-16 RX ADMIN — DIPHENHYDRAMINE HYDROCHLORIDE 50 MG: 50 INJECTION, SOLUTION INTRAMUSCULAR; INTRAVENOUS at 03:12

## 2024-12-16 RX ADMIN — LORAZEPAM 0.5 MG: 0.5 TABLET ORAL at 11:12

## 2024-12-16 NOTE — PROGRESS NOTES
[facility] ([facility])  [facility]  Progress Note    Patient Name: Nzaanin Malone  MRN: [id number]  Patient Class: IP- Inpatient   Admission Date: 2024  Length of Stay: 6 days  Attending Physician: Brant Freed MD  Primary Care Provider: Pee Montgomery MD        Subjective     Principal Problem:Vaso-occlusive pain due to sickle cell disease        HPI:  Nazanin Maolne is a 44 year old Black woman with sickle cell type S beta thalassemia, recurrent vaso-occlusive pain crises, chronic opioid use, avascular necrosis of the left femur, hypertension, asthma, trigeminal neuralgia, history of pulmonary embolism (anticoagulated on apixaban), history of rhabdomyolysis status post fasciotomy in , history of chronic gastritis and duodenitis on 2024, history of  section, history of tubal ligation, history of tonsillectomy. She lives in [facility]. She works at [employer].   She was hospitalized at [facility] from 2024 to 12/3/2024 for vaso-occlusive pain. She returned to [facility] Emergency Department on 2024. She reported feeling good for approximately 3 days. She started having non-bloody diarrhea, loose and occurring 2 to 3 times a day, 3 days prior to presentation that she attributed to gastroenteritis, with associated nausea. She denied sick contacts. Since then, she had less oral intake and noticed increasing lower extremity and hip pain as well as abdominal pain radiating into her chest, typical of vaso-occlusive pain crisis. She ran out of pain medications a day prior to presentation. She has not yet been prescribed hydroxyurea. She takes it when she is admitted to the hospital and has not had significant side effects from it. She had associated shortness of breath when pain was severe and intermittent headache that resolved when she was given pain medications in the emergency  department. She was given IV hydromorphone. She was admitted to Hospital Medicine Team B.    Overview/Hospital Course:  She reported severe abdominal pain with nausea and diarrhea has happened with prior pain crises as well. She had infectious GI workup in November that showed chronic gastritis. She had no diarrhea for 48 hours after admission. Stool studies were cancelled. She was put on IV hydromorphone 3 mg every 3 hours. She was given multimodal analgesic regimen with diphenhydramine, sucralfate, and additional medications as needed for supportive care. Loyd catheter was placed for urinary retention. She became hypoxic. IV fluids were held. Ultrasound mesenteric ischemia study showed no evidence of hemodynamically significant stenosis of either the celiac or superior mesenteric arteries, but the inferior mesenteric artery was not well visualized. Gastroenterology was consulted. They recommended increasing pantoprazole to twice daily, adding famotidine nightly, and checking H pylori serology.     Interval History: She says GI talked to her about getting an ultrasound done.    Review of Systems   Constitutional:  Negative for chills and fever.   Gastrointestinal:  Positive for abdominal pain and nausea.   Neurological:  Negative for seizures and syncope.     Objective:     Vital Signs (Most Recent):  Temp: 98 °F (36.7 °C) (12/16/24 1155)  Pulse: 78 (12/16/24 0718)  Resp: 18 (12/16/24 1155)  BP: (!) 158/89 (12/16/24 1155)  SpO2: 99 % (12/16/24 0718) Vital Signs (24h Range):  Temp:  [98 °F (36.7 °C)-99 °F (37.2 °C)] 98 °F (36.7 °C)  Pulse:  [72-88] 78  Resp:  [18-20] 18  SpO2:  [99 %-100 %] 99 %  BP: (145-164)/(84-99) 158/89     Weight: 97.2 kg (214 lb 4.6 oz)  Body mass index is 39.19 kg/m².    Intake/Output Summary (Last 24 hours) at 12/16/2024 1447  Last data filed at 12/15/2024 1811  Gross per 24 hour   Intake --   Output 1200 ml   Net -1200 ml         Physical Exam  Vitals and nursing note reviewed.    Constitutional:       General: She is not in acute distress.     Appearance: She is well-developed. She is obese. She is not diaphoretic.      Interventions: She is not intubated.Nasal cannula in place.   Pulmonary:      Effort: Pulmonary effort is normal. No accessory muscle usage or respiratory distress. She is not intubated.   Skin:     General: Skin is warm and dry.      Coloration: Skin is not jaundiced or pale.   Neurological:      Mental Status: She is alert and oriented to person, place, and time. Mental status is at baseline.      Motor: No seizure activity.   Psychiatric:         Attention and Perception: Attention normal.         Behavior: Behavior is not agitated or aggressive. Behavior is cooperative.             Significant Labs: All pertinent labs within the past 24 hours have been reviewed.    Significant Imaging: I have reviewed all pertinent imaging results/findings within the past 24 hours.    Assessment and Plan     * Vaso-occlusive pain due to sickle cell disease  Avascular necrosis of femur  No cough, fever, consolidation, or leukocytosis. Stable on RA. No concern for acute chest at this time. Repeat CXR stable.   Cont home folic acid and hydroxyurea  Multimodal pain management with scheduled IV Dilaudid, oxy for breakthrough, scheduled MS Contin, gabapentin, Robaxin and Tylenol.  IV fluids held due to hypoxia  Avascular necrosis of femurs noted on prior imaging.     Urinary retention  Loyd placed 12/11/2024.     Diarrhea  See Chronic gastritis     Abdominal pain  See Chronic gastritis       Chronic gastritis  Occurs with pain crises. Added sucralfate. Continue home pantoprazole, dicyclomine. giving prn medications. Mesenteric ultrasound does not show an obvious abnormality. Has not followed up with GI since her EGD last month. Consulted GI and appreciate recommendations. Increased home pantoprazole to BID, added famotidine prn.    History of pulmonary embolism  Chronic anticoagulation  Hold  home apixaban in case she needs endoscopy.    Anxiety and depression  Cont home Prozac and p.r.n. Vistaril    Obesity (BMI 30-39.9)  Body mass index is 39.19 kg/m². Morbid obesity complicates all aspects of disease management from diagnostic modalities to treatment.     Drug dependence  Ran out of pain meds day prior to admission.  Will need outpatient physicians to prescribe long-term pain meds.  Consider pain management.      VTE Risk Mitigation (From admission, onward)           Ordered     IP VTE HIGH RISK PATIENT  Once         12/09/24 2321     Place sequential compression device  Until discontinued         12/09/24 2321                    Discharge Planning   ALYSE: 12/17/2024     Code Status: DNR   Medical Readiness for Discharge Date:   Discharge Plan A: Home with family                        Brant Freed MD  Department of Hospital Medicine   [facility] ([facility])

## 2024-12-16 NOTE — SUBJECTIVE & OBJECTIVE
Interval History: She says GI talked to her about getting an ultrasound done.    Review of Systems   Constitutional:  Negative for chills and fever.   Gastrointestinal:  Positive for abdominal pain and nausea.   Neurological:  Negative for seizures and syncope.     Objective:     Vital Signs (Most Recent):  Temp: 98 °F (36.7 °C) (12/16/24 1155)  Pulse: 78 (12/16/24 0718)  Resp: 18 (12/16/24 1155)  BP: (!) 158/89 (12/16/24 1155)  SpO2: 99 % (12/16/24 0718) Vital Signs (24h Range):  Temp:  [98 °F (36.7 °C)-99 °F (37.2 °C)] 98 °F (36.7 °C)  Pulse:  [72-88] 78  Resp:  [18-20] 18  SpO2:  [99 %-100 %] 99 %  BP: (145-164)/(84-99) 158/89     Weight: 97.2 kg (214 lb 4.6 oz)  Body mass index is 39.19 kg/m².    Intake/Output Summary (Last 24 hours) at 12/16/2024 1447  Last data filed at 12/15/2024 1811  Gross per 24 hour   Intake --   Output 1200 ml   Net -1200 ml         Physical Exam  Vitals and nursing note reviewed.   Constitutional:       General: She is not in acute distress.     Appearance: She is well-developed. She is obese. She is not diaphoretic.      Interventions: She is not intubated.Nasal cannula in place.   Pulmonary:      Effort: Pulmonary effort is normal. No accessory muscle usage or respiratory distress. She is not intubated.   Skin:     General: Skin is warm and dry.      Coloration: Skin is not jaundiced or pale.   Neurological:      Mental Status: She is alert and oriented to person, place, and time. Mental status is at baseline.      Motor: No seizure activity.   Psychiatric:         Attention and Perception: Attention normal.         Behavior: Behavior is not agitated or aggressive. Behavior is cooperative.             Significant Labs: All pertinent labs within the past 24 hours have been reviewed.    Significant Imaging: I have reviewed all pertinent imaging results/findings within the past 24 hours.

## 2024-12-16 NOTE — ASSESSMENT & PLAN NOTE
Occurs with pain crises. Added sucralfate. Continue home pantoprazole, dicyclomine. giving prn medications. Mesenteric ultrasound does not show an obvious abnormality. Has not followed up with GI since her EGD last month. Consulted GI and appreciate recommendations. Increased home pantoprazole to BID, added famotidine prn.

## 2024-12-16 NOTE — PLAN OF CARE
[facility] (H[facility])  Discharge Reassessment    Primary Care Provider: Pee Montgomery MD    Expected Discharge Date: 12/17/2024    Reassessment (most recent)       Discharge Reassessment - 12/16/24 1302          Discharge Reassessment    Assessment Type Discharge Planning Reassessment (P)      Did the patient's condition or plan change since previous assessment? No (P)      Discharge Plan discussed with: Patient (P)      Communicated ALYSE with patient/caregiver Date not available/Unable to determine (P)      Discharge Plan A Home with family (P)      Discharge Plan B Home with family;Home Health (P)      DME Needed Upon Discharge  none (P)      Transition of Care Barriers None (P)      Why the patient remains in the hospital Requires continued medical care (P)         Post-Acute Status    Post-Acute Authorization Other (P)      Other Status No Post-Acute Service Needs (P)                    Patient not medically ready.  Pt receiving IV Dilaudid and is NPO for a CTA.  Will need outpatient pain management when discharged.      Discharge Plan A and Plan B have been determined by review of patient's clinical status, future medical and therapeutic needs, and coverage/benefits for post-acute care in coordination with multidisciplinary team members.    Radha Castillo, FAN  [facility]  [phone]

## 2024-12-17 LAB
BASOPHILS # BLD AUTO: 0.05 K/UL (ref 0–0.2)
BASOPHILS NFR BLD: 0.8 % (ref 0–1.9)
DIFFERENTIAL METHOD BLD: ABNORMAL
EOSINOPHIL # BLD AUTO: 0.4 K/UL (ref 0–0.5)
EOSINOPHIL NFR BLD: 6 % (ref 0–8)
ERYTHROCYTE [DISTWIDTH] IN BLOOD BY AUTOMATED COUNT: 19.9 % (ref 11.5–14.5)
H PYLORI IGG SERPL QL IA: NEGATIVE
HCT VFR BLD AUTO: 24.8 % (ref 37–48.5)
HGB BLD-MCNC: 7.9 G/DL (ref 12–16)
IMM GRANULOCYTES # BLD AUTO: 0.01 K/UL (ref 0–0.04)
IMM GRANULOCYTES NFR BLD AUTO: 0.2 % (ref 0–0.5)
LYMPHOCYTES # BLD AUTO: 3 K/UL (ref 1–4.8)
LYMPHOCYTES NFR BLD: 49 % (ref 18–48)
MCH RBC QN AUTO: 21 PG (ref 27–31)
MCHC RBC AUTO-ENTMCNC: 31.9 G/DL (ref 32–36)
MCV RBC AUTO: 66 FL (ref 82–98)
MONOCYTES # BLD AUTO: 0.6 K/UL (ref 0.3–1)
MONOCYTES NFR BLD: 10.4 % (ref 4–15)
NEUTROPHILS # BLD AUTO: 2 K/UL (ref 1.8–7.7)
NEUTROPHILS NFR BLD: 33.6 % (ref 38–73)
NRBC BLD-RTO: 2 /100 WBC
PLATELET # BLD AUTO: 198 K/UL (ref 150–450)
PMV BLD AUTO: 9.6 FL (ref 9.2–12.9)
RBC # BLD AUTO: 3.77 M/UL (ref 4–5.4)
WBC # BLD AUTO: 6.04 K/UL (ref 3.9–12.7)

## 2024-12-17 PROCEDURE — 25000003 PHARM REV CODE 250: Performed by: HOSPITALIST

## 2024-12-17 PROCEDURE — 86677 HELICOBACTER PYLORI ANTIBODY: CPT | Performed by: HOSPITALIST

## 2024-12-17 PROCEDURE — 85025 COMPLETE CBC W/AUTO DIFF WBC: CPT | Performed by: HOSPITALIST

## 2024-12-17 PROCEDURE — A9537 TC99M MEBROFENIN: HCPCS | Performed by: HOSPITALIST

## 2024-12-17 PROCEDURE — 25000003 PHARM REV CODE 250: Performed by: FAMILY MEDICINE

## 2024-12-17 PROCEDURE — 63600175 PHARM REV CODE 636 W HCPCS: Performed by: HOSPITALIST

## 2024-12-17 PROCEDURE — 11000001 HC ACUTE MED/SURG PRIVATE ROOM

## 2024-12-17 PROCEDURE — 63600175 PHARM REV CODE 636 W HCPCS: Performed by: STUDENT IN AN ORGANIZED HEALTH CARE EDUCATION/TRAINING PROGRAM

## 2024-12-17 PROCEDURE — 25000003 PHARM REV CODE 250: Performed by: STUDENT IN AN ORGANIZED HEALTH CARE EDUCATION/TRAINING PROGRAM

## 2024-12-17 RX ORDER — METOCLOPRAMIDE 5 MG/1
5 TABLET ORAL
Status: DISCONTINUED | OUTPATIENT
Start: 2024-12-17 | End: 2024-12-18 | Stop reason: HOSPADM

## 2024-12-17 RX ORDER — KIT FOR THE PREPARATION OF TECHNETIUM TC 99M MEBROFENIN 45 MG/10ML
5 INJECTION, POWDER, LYOPHILIZED, FOR SOLUTION INTRAVENOUS
Status: COMPLETED | OUTPATIENT
Start: 2024-12-17 | End: 2024-12-17

## 2024-12-17 RX ADMIN — ACETAMINOPHEN 1000 MG: 500 TABLET ORAL at 05:12

## 2024-12-17 RX ADMIN — MORPHINE SULFATE 30 MG: 30 TABLET, FILM COATED, EXTENDED RELEASE ORAL at 09:12

## 2024-12-17 RX ADMIN — MORPHINE SULFATE 30 MG: 30 TABLET, FILM COATED, EXTENDED RELEASE ORAL at 08:12

## 2024-12-17 RX ADMIN — POLYETHYLENE GLYCOL 3350 17 G: 17 POWDER, FOR SOLUTION ORAL at 08:12

## 2024-12-17 RX ADMIN — GABAPENTIN 600 MG: 300 CAPSULE ORAL at 01:12

## 2024-12-17 RX ADMIN — SUCRALFATE 1 G: 1 SUSPENSION ORAL at 05:12

## 2024-12-17 RX ADMIN — DIPHENHYDRAMINE HYDROCHLORIDE 50 MG: 50 INJECTION, SOLUTION INTRAMUSCULAR; INTRAVENOUS at 05:12

## 2024-12-17 RX ADMIN — DICYCLOMINE HYDROCHLORIDE 10 MG: 10 CAPSULE ORAL at 08:12

## 2024-12-17 RX ADMIN — HYDROMORPHONE HYDROCHLORIDE 3 MG: 1 INJECTION, SOLUTION INTRAMUSCULAR; INTRAVENOUS; SUBCUTANEOUS at 01:12

## 2024-12-17 RX ADMIN — AMLODIPINE BESYLATE 10 MG: 10 TABLET ORAL at 08:12

## 2024-12-17 RX ADMIN — ACETAMINOPHEN 1000 MG: 500 TABLET ORAL at 09:12

## 2024-12-17 RX ADMIN — DICYCLOMINE HYDROCHLORIDE 10 MG: 10 CAPSULE ORAL at 09:12

## 2024-12-17 RX ADMIN — SUCRALFATE 1 G: 1 SUSPENSION ORAL at 01:12

## 2024-12-17 RX ADMIN — PANTOPRAZOLE SODIUM 40 MG: 40 TABLET, DELAYED RELEASE ORAL at 04:12

## 2024-12-17 RX ADMIN — HYDROXYUREA 1000 MG: 500 CAPSULE ORAL at 08:12

## 2024-12-17 RX ADMIN — HYDROMORPHONE HYDROCHLORIDE 3 MG: 1 INJECTION, SOLUTION INTRAMUSCULAR; INTRAVENOUS; SUBCUTANEOUS at 02:12

## 2024-12-17 RX ADMIN — GABAPENTIN 600 MG: 300 CAPSULE ORAL at 08:12

## 2024-12-17 RX ADMIN — DIPHENHYDRAMINE HYDROCHLORIDE 50 MG: 50 INJECTION, SOLUTION INTRAMUSCULAR; INTRAVENOUS at 10:12

## 2024-12-17 RX ADMIN — ACETAMINOPHEN 1000 MG: 500 TABLET ORAL at 01:12

## 2024-12-17 RX ADMIN — DIPHENHYDRAMINE HYDROCHLORIDE 50 MG: 50 INJECTION, SOLUTION INTRAMUSCULAR; INTRAVENOUS at 09:12

## 2024-12-17 RX ADMIN — HYDROMORPHONE HYDROCHLORIDE 3 MG: 1 INJECTION, SOLUTION INTRAMUSCULAR; INTRAVENOUS; SUBCUTANEOUS at 09:12

## 2024-12-17 RX ADMIN — PANTOPRAZOLE SODIUM 40 MG: 40 TABLET, DELAYED RELEASE ORAL at 06:12

## 2024-12-17 RX ADMIN — DIPHENHYDRAMINE HYDROCHLORIDE 50 MG: 50 INJECTION, SOLUTION INTRAMUSCULAR; INTRAVENOUS at 01:12

## 2024-12-17 RX ADMIN — METOCLOPRAMIDE 5 MG: 5 TABLET ORAL at 04:12

## 2024-12-17 RX ADMIN — FOLIC ACID 1 MG: 1 TABLET ORAL at 08:12

## 2024-12-17 RX ADMIN — MEBROFENIN 5.5 MILLICURIE: 45 INJECTION, POWDER, LYOPHILIZED, FOR SOLUTION INTRAVENOUS at 10:12

## 2024-12-17 RX ADMIN — HYDROMORPHONE HYDROCHLORIDE 3 MG: 1 INJECTION, SOLUTION INTRAMUSCULAR; INTRAVENOUS; SUBCUTANEOUS at 04:12

## 2024-12-17 RX ADMIN — FLUOXETINE HYDROCHLORIDE 40 MG: 20 CAPSULE ORAL at 08:12

## 2024-12-17 RX ADMIN — LORAZEPAM 0.5 MG: 0.5 TABLET ORAL at 01:12

## 2024-12-17 RX ADMIN — GABAPENTIN 600 MG: 300 CAPSULE ORAL at 09:12

## 2024-12-17 RX ADMIN — DICYCLOMINE HYDROCHLORIDE 10 MG: 10 CAPSULE ORAL at 01:12

## 2024-12-17 RX ADMIN — DICYCLOMINE HYDROCHLORIDE 10 MG: 10 CAPSULE ORAL at 04:12

## 2024-12-17 RX ADMIN — HYDROMORPHONE HYDROCHLORIDE 3 MG: 1 INJECTION, SOLUTION INTRAMUSCULAR; INTRAVENOUS; SUBCUTANEOUS at 05:12

## 2024-12-17 RX ADMIN — HYDROMORPHONE HYDROCHLORIDE 3 MG: 1 INJECTION, SOLUTION INTRAMUSCULAR; INTRAVENOUS; SUBCUTANEOUS at 08:12

## 2024-12-17 NOTE — PLAN OF CARE
POC updated and reviewed with the patient at the bedside. Questions regarding POC were encouraged and addressed. VSS, see flowsheets. Patient is AOX4 at this time. NAEON. Fall and safety precautions maintained, no signs of injury noted during shift. Patient repositioned independently in bed for comfort. Upon exiting room, patient's bed locked in low position, side rails up x 3, bed alarm set, with call light within reach. Instructed patient to call staff for mobility, verbalized understanding.     Started patient on NPO from 12 midnight for upcoming CT scan (no order up) and endoscopy in the morning.    Problem: Adult Inpatient Plan of Care  Goal: Plan of Care Review  Outcome: Progressing  Goal: Patient-Specific Goal (Individualized)  Outcome: Progressing  Goal: Absence of Hospital-Acquired Illness or Injury  Outcome: Progressing  Goal: Optimal Comfort and Wellbeing  Outcome: Progressing  Goal: Readiness for Transition of Care  Outcome: Progressing     Problem: Infection  Goal: Absence of Infection Signs and Symptoms  Outcome: Progressing

## 2024-12-17 NOTE — PROGRESS NOTES
[facility] Neurosurgery ([facility])  [facility] Medicine  Progress Note    Patient Name: Nazanin Malone  MRN: [id number]  Patient Class: IP- Inpatient   Admission Date: 2024  Length of Stay: 7 days  Attending Physician: Brant Freed MD  Primary Care Provider: Pee Montgomery MD        Subjective     Principal Problem:Vaso-occlusive pain due to sickle cell disease        HPI:  Nazanin Malone is a 44 year old Black woman with sickle cell type S beta thalassemia, recurrent vaso-occlusive pain crises, chronic opioid use, avascular necrosis of the left femur, hypertension, asthma, trigeminal neuralgia, history of pulmonary embolism (anticoagulated on apixaban), history of rhabdomyolysis status post fasciotomy in , history of chronic gastritis and duodenitis on 2024, history of  section, history of tubal ligation, history of tonsillectomy. She lives in [place] [facility] [place]. She works at [employer].   She was hospitalized at [facility] from 2024 to 12/3/2024 for vaso-occlusive pain. She returned to [facility] Emergency Department on 2024. She reported feeling good for approximately 3 days. She started having non-bloody diarrhea, loose and occurring 2 to 3 times a day, 3 days prior to presentation that she attributed to gastroenteritis, with associated nausea. She denied sick contacts. Since then, she had less oral intake and noticed increasing lower extremity and hip pain as well as abdominal pain radiating into her chest, typical of vaso-occlusive pain crisis. She ran out of pain medications a day prior to presentation. She has not yet been prescribed hydroxyurea. She takes it when she is admitted to the hospital and has not had significant side effects from it. She had associated shortness of breath when pain was severe and intermittent headache that resolved when she was given pain medications in the emergency  department. She was given IV hydromorphone. She was admitted to Hospital Medicine Team B.    Overview/Hospital Course:  She reported severe abdominal pain with nausea and diarrhea has happened with prior pain crises as well. She had infectious GI workup in November that showed chronic gastritis. She had no diarrhea for 48 hours after admission. Stool studies were cancelled. She was put on IV hydromorphone 3 mg every 3 hours. She was given multimodal analgesic regimen with diphenhydramine, sucralfate, and additional medications as needed for supportive care. Loyd catheter was placed for urinary retention. She became hypoxic. IV fluids were held. Ultrasound mesenteric ischemia study showed no evidence of hemodynamically significant stenosis of either the celiac or superior mesenteric arteries, but the inferior mesenteric artery was not well visualized. Gastroenterology was consulted. They recommended increasing pantoprazole to twice daily, adding famotidine nightly, and checking H pylori serology. H pylori IgG was negative. Abdominal ultrasound and HIDA scan showed no gallbladder disease. She reported bloating. Her opioid use can cause gastroparesis.      Interval History: She reports bloating, fullness after eating. Symptoms could be gastroparesis. She must take opioids for sickle cell pain. Discussed trying medication trial for gastroparesis.     Review of Systems   Constitutional:  Negative for chills and fever.   Gastrointestinal:  Positive for abdominal pain and nausea.   Neurological:  Negative for seizures and syncope.     Objective:     Vital Signs (Most Recent):  Temp: 98.2 °F (36.8 °C) (12/17/24 0727)  Pulse: 89 (12/17/24 0727)  Resp: 16 (12/17/24 1306)  BP: (!) 151/80 (12/17/24 0727)  SpO2: 100 % (12/17/24 0727) Vital Signs (24h Range):  Temp:  [97.8 °F (36.6 °C)-100.2 °F (37.9 °C)] 98.2 °F (36.8 °C)  Pulse:  [72-90] 89  Resp:  [16-20] 16  SpO2:  [98 %-100 %] 100 %  BP: (133-186)/() 151/80      Weight: 97.2 kg (214 lb 4.6 oz)  Body mass index is 39.19 kg/m².    Intake/Output Summary (Last 24 hours) at 12/17/2024 1355  Last data filed at 12/17/2024 0600  Gross per 24 hour   Intake 230 ml   Output 2300 ml   Net -2070 ml         Physical Exam  Vitals and nursing note reviewed.   Constitutional:       General: She is not in acute distress.     Appearance: She is well-developed. She is obese. She is not diaphoretic.      Interventions: She is not intubated.Nasal cannula in place.   Pulmonary:      Effort: Pulmonary effort is normal. No accessory muscle usage or respiratory distress. She is not intubated.   Skin:     General: Skin is warm and dry.      Coloration: Skin is not jaundiced or pale.   Neurological:      Mental Status: She is alert and oriented to person, place, and time. Mental status is at baseline.      Motor: No seizure activity.   Psychiatric:         Attention and Perception: Attention normal.         Behavior: Behavior is not agitated or aggressive. Behavior is cooperative.             Significant Labs: All pertinent labs within the past 24 hours have been reviewed.    Significant Imaging: I have reviewed all pertinent imaging results/findings within the past 24 hours.  US Abdomen Limited 12/17/24: FINDINGS:   Gallbladder: No calculi, wall thickening, or pericholecystic fluid.  No sonographic Medellin's sign.   Biliary system: The common duct is not dilated, measuring 3 mm.  No intrahepatic ductal dilatation.   Pancreas: The visualized portions of pancreas appear normal.  Small peripancreatic lymph node measuring 1.1 x 12.3 cm.   Right kidney: No discrete hydronephrosis.  Mild pelviectasis similar to prior exam   Miscellaneous: No upper abdominal ascites and no abnormalities of the visualized liver.   Impression: No acute sonographic abnormality.   NM Hepatobiliary Scan (HIDA) 12/17/24: FINDINGS:   The early images demonstrate homogeneous uptake of the radiopharmaceutical by the liver with no  focal hepatic abnormalities.   Normal activity is present in the common bile duct, gallbladder, and small bowel.   The clearance from the liver is good.   Impression:  The cystic and common shemar ducts are patent.  No scintigraphic evidence of acute cholecystitis     Assessment and Plan     * Vaso-occlusive pain due to sickle cell disease  Avascular necrosis of femur  No cough, fever, consolidation, or leukocytosis. Stable on RA. No concern for acute chest at this time. Repeat CXR stable.   Cont home folic acid and hydroxyurea  Multimodal pain management with scheduled IV Dilaudid, oxy for breakthrough, scheduled MS Contin, gabapentin, Robaxin and Tylenol.  IV fluids held due to hypoxia  Avascular necrosis of femurs noted on prior imaging.     Urinary retention  Loyd placed 12/11/2024.     Diarrhea  See Chronic gastritis     Abdominal pain  See Chronic gastritis       Chronic gastritis  Occurs with pain crises. Added sucralfate. Continue home pantoprazole, dicyclomine. giving prn medications. Mesenteric ultrasound does not show an obvious abnormality. Appreciate Gastroenterology recommendations. Increased home pantoprazole to BID, added famotidine prn. HIDA negative. Try metoclopramide.     History of pulmonary embolism  Chronic anticoagulation  Hold home apixaban in case she needs endoscopy.    Anxiety and depression  Cont home Prozac and p.r.n. Vistaril    Obesity (BMI 30-39.9)  Body mass index is 39.19 kg/m². Morbid obesity complicates all aspects of disease management from diagnostic modalities to treatment.     Drug dependence  Ran out of pain meds day prior to admission.  Will need outpatient physicians to prescribe long-term pain meds.  Consider pain management.    Hypertension  Resumed home amlodipine. Monitor BP.      VTE Risk Mitigation (From admission, onward)           Ordered     IP VTE HIGH RISK PATIENT  Once         12/09/24 2321     Place sequential compression device  Until discontinued          12/09/24 2321                    Discharge Planning   ALYSE: 12/17/2024     Code Status: DNR   Medical Readiness for Discharge Date:   Discharge Plan A: Home with family                        Brant Freed MD  Department of Hospital Medicine   [facility] - Neurosurgery ([facility])

## 2024-12-17 NOTE — PLAN OF CARE
A&O x4, able to make needs known and call light within reach. VSS this shift; receiving pain medication ATC -- see MAR. NOELLE study and Abd US done today. Diet resumed. Started on Reglan. Loyd catheter care completed.  12/17. All questions answered, needs met and safety checks preformed. Refuses bed alarm; ambulatory and gait steady.       Problem: Adult Inpatient Plan of Care  Goal: Optimal Comfort and Wellbeing  Outcome: Progressing     Problem: Infection  Goal: Absence of Infection Signs and Symptoms  Outcome: Progressing

## 2024-12-17 NOTE — ASSESSMENT & PLAN NOTE
Occurs with pain crises. Added sucralfate. Continue home pantoprazole, dicyclomine. giving prn medications. Mesenteric ultrasound does not show an obvious abnormality. Appreciate Gastroenterology recommendations. Increased home pantoprazole to BID, added famotidine prn. HIDA negative. Try metoclopramide.

## 2024-12-17 NOTE — SUBJECTIVE & OBJECTIVE
Interval History: She reports bloating, fullness after eating. Symptoms could be gastroparesis. She must take opioids for sickle cell pain. Discussed trying medication trial for gastroparesis.     Review of Systems   Constitutional:  Negative for chills and fever.   Gastrointestinal:  Positive for abdominal pain and nausea.   Neurological:  Negative for seizures and syncope.     Objective:     Vital Signs (Most Recent):  Temp: 98.2 °F (36.8 °C) (12/17/24 0727)  Pulse: 89 (12/17/24 0727)  Resp: 16 (12/17/24 1306)  BP: (!) 151/80 (12/17/24 0727)  SpO2: 100 % (12/17/24 0727) Vital Signs (24h Range):  Temp:  [97.8 °F (36.6 °C)-100.2 °F (37.9 °C)] 98.2 °F (36.8 °C)  Pulse:  [72-90] 89  Resp:  [16-20] 16  SpO2:  [98 %-100 %] 100 %  BP: (133-186)/() 151/80     Weight: 97.2 kg (214 lb 4.6 oz)  Body mass index is 39.19 kg/m².    Intake/Output Summary (Last 24 hours) at 12/17/2024 1355  Last data filed at 12/17/2024 0600  Gross per 24 hour   Intake 230 ml   Output 2300 ml   Net -2070 ml         Physical Exam  Vitals and nursing note reviewed.   Constitutional:       General: She is not in acute distress.     Appearance: She is well-developed. She is obese. She is not diaphoretic.      Interventions: She is not intubated.Nasal cannula in place.   Pulmonary:      Effort: Pulmonary effort is normal. No accessory muscle usage or respiratory distress. She is not intubated.   Skin:     General: Skin is warm and dry.      Coloration: Skin is not jaundiced or pale.   Neurological:      Mental Status: She is alert and oriented to person, place, and time. Mental status is at baseline.      Motor: No seizure activity.   Psychiatric:         Attention and Perception: Attention normal.         Behavior: Behavior is not agitated or aggressive. Behavior is cooperative.             Significant Labs: All pertinent labs within the past 24 hours have been reviewed.    Significant Imaging: I have reviewed all pertinent imaging  results/findings within the past 24 hours.  US Abdomen Limited 12/17/24: FINDINGS:   Gallbladder: No calculi, wall thickening, or pericholecystic fluid.  No sonographic Medellin's sign.   Biliary system: The common duct is not dilated, measuring 3 mm.  No intrahepatic ductal dilatation.   Pancreas: The visualized portions of pancreas appear normal.  Small peripancreatic lymph node measuring 1.1 x 12.3 cm.   Right kidney: No discrete hydronephrosis.  Mild pelviectasis similar to prior exam   Miscellaneous: No upper abdominal ascites and no abnormalities of the visualized liver.   Impression: No acute sonographic abnormality.   NM Hepatobiliary Scan (HIDA) 12/17/24: FINDINGS:   The early images demonstrate homogeneous uptake of the radiopharmaceutical by the liver with no focal hepatic abnormalities.   Normal activity is present in the common bile duct, gallbladder, and small bowel.   The clearance from the liver is good.   Impression:  The cystic and common shemar ducts are patent.  No scintigraphic evidence of acute cholecystitis

## 2024-12-18 VITALS
RESPIRATION RATE: 16 BRPM | WEIGHT: 214.31 LBS | BODY MASS INDEX: 39.44 KG/M2 | DIASTOLIC BLOOD PRESSURE: 81 MMHG | HEIGHT: 62 IN | OXYGEN SATURATION: 96 % | SYSTOLIC BLOOD PRESSURE: 162 MMHG | TEMPERATURE: 98 F | HEART RATE: 91 BPM

## 2024-12-18 PROBLEM — R06.00 DYSPNEA: Status: RESOLVED | Noted: 2024-12-10 | Resolved: 2024-12-18

## 2024-12-18 PROBLEM — F19.20 DRUG DEPENDENCE: Chronic | Status: ACTIVE | Noted: 2017-04-16

## 2024-12-18 PROBLEM — R19.7 DIARRHEA: Status: RESOLVED | Noted: 2024-11-11 | Resolved: 2024-12-18

## 2024-12-18 PROBLEM — D57.00 VASO-OCCLUSIVE PAIN DUE TO SICKLE CELL DISEASE: Chronic | Status: ACTIVE | Noted: 2017-04-16

## 2024-12-18 PROCEDURE — 25000003 PHARM REV CODE 250: Performed by: FAMILY MEDICINE

## 2024-12-18 PROCEDURE — 63600175 PHARM REV CODE 636 W HCPCS: Performed by: HOSPITALIST

## 2024-12-18 PROCEDURE — 25000003 PHARM REV CODE 250: Performed by: NURSE PRACTITIONER

## 2024-12-18 PROCEDURE — 25000003 PHARM REV CODE 250: Performed by: STUDENT IN AN ORGANIZED HEALTH CARE EDUCATION/TRAINING PROGRAM

## 2024-12-18 PROCEDURE — 63600175 PHARM REV CODE 636 W HCPCS: Performed by: PHYSICIAN ASSISTANT

## 2024-12-18 PROCEDURE — 25000003 PHARM REV CODE 250: Performed by: HOSPITALIST

## 2024-12-18 PROCEDURE — 63600175 PHARM REV CODE 636 W HCPCS: Performed by: STUDENT IN AN ORGANIZED HEALTH CARE EDUCATION/TRAINING PROGRAM

## 2024-12-18 RX ORDER — SUCRALFATE 1 G/1
1 TABLET ORAL
Qty: 120 TABLET | Refills: 11 | Status: SHIPPED | OUTPATIENT
Start: 2024-12-18 | End: 2025-12-18

## 2024-12-18 RX ORDER — OXYCODONE HYDROCHLORIDE 15 MG/1
15 TABLET ORAL EVERY 4 HOURS PRN
Qty: 30 TABLET | Refills: 0 | Status: SHIPPED | OUTPATIENT
Start: 2024-12-18 | End: 2024-12-18 | Stop reason: SDUPTHER

## 2024-12-18 RX ORDER — METOCLOPRAMIDE 5 MG/1
5 TABLET ORAL
Qty: 30 TABLET | Refills: 11 | Status: SHIPPED | OUTPATIENT
Start: 2024-12-18

## 2024-12-18 RX ORDER — PANTOPRAZOLE SODIUM 40 MG/1
40 TABLET, DELAYED RELEASE ORAL 2 TIMES DAILY
Qty: 180 TABLET | Refills: 3 | Status: SHIPPED | OUTPATIENT
Start: 2024-12-18 | End: 2025-12-18

## 2024-12-18 RX ORDER — LORAZEPAM 0.5 MG/1
1 TABLET ORAL EVERY 8 HOURS PRN
Qty: 10 TABLET | Refills: 0 | Status: SHIPPED | OUTPATIENT
Start: 2024-12-18 | End: 2025-01-17

## 2024-12-18 RX ORDER — MUPIROCIN 20 MG/G
OINTMENT TOPICAL 2 TIMES DAILY
Status: DISCONTINUED | OUTPATIENT
Start: 2024-12-18 | End: 2024-12-18 | Stop reason: HOSPADM

## 2024-12-18 RX ORDER — HYDROMORPHONE HYDROCHLORIDE 1 MG/ML
3 INJECTION, SOLUTION INTRAMUSCULAR; INTRAVENOUS; SUBCUTANEOUS EVERY 4 HOURS
Status: DISCONTINUED | OUTPATIENT
Start: 2024-12-18 | End: 2024-12-18 | Stop reason: HOSPADM

## 2024-12-18 RX ORDER — HEPARIN 100 UNIT/ML
5 SYRINGE INTRAVENOUS
Status: DISCONTINUED | OUTPATIENT
Start: 2024-12-18 | End: 2024-12-18 | Stop reason: HOSPADM

## 2024-12-18 RX ORDER — SUCRALFATE 1 G/10ML
1 SUSPENSION ORAL
Qty: 1200 ML | Refills: 11 | Status: SHIPPED | OUTPATIENT
Start: 2024-12-18 | End: 2024-12-18 | Stop reason: HOSPADM

## 2024-12-18 RX ORDER — HYDROMORPHONE HYDROCHLORIDE 1 MG/ML
3 INJECTION, SOLUTION INTRAMUSCULAR; INTRAVENOUS; SUBCUTANEOUS
Status: DISCONTINUED | OUTPATIENT
Start: 2024-12-18 | End: 2024-12-18

## 2024-12-18 RX ORDER — METHOCARBAMOL 750 MG/1
750 TABLET, FILM COATED ORAL 3 TIMES DAILY PRN
Qty: 30 TABLET | Refills: 0 | Status: SHIPPED | OUTPATIENT
Start: 2024-12-18

## 2024-12-18 RX ADMIN — FOLIC ACID 1 MG: 1 TABLET ORAL at 08:12

## 2024-12-18 RX ADMIN — HYDROMORPHONE HYDROCHLORIDE 3 MG: 1 INJECTION, SOLUTION INTRAMUSCULAR; INTRAVENOUS; SUBCUTANEOUS at 02:12

## 2024-12-18 RX ADMIN — DIPHENHYDRAMINE HYDROCHLORIDE 50 MG: 50 INJECTION, SOLUTION INTRAMUSCULAR; INTRAVENOUS at 03:12

## 2024-12-18 RX ADMIN — DIPHENHYDRAMINE HYDROCHLORIDE 50 MG: 50 INJECTION, SOLUTION INTRAMUSCULAR; INTRAVENOUS at 08:12

## 2024-12-18 RX ADMIN — METOCLOPRAMIDE 5 MG: 5 TABLET ORAL at 04:12

## 2024-12-18 RX ADMIN — HYDROXYUREA 1000 MG: 500 CAPSULE ORAL at 08:12

## 2024-12-18 RX ADMIN — HYDROMORPHONE HYDROCHLORIDE 3 MG: 1 INJECTION, SOLUTION INTRAMUSCULAR; INTRAVENOUS; SUBCUTANEOUS at 06:12

## 2024-12-18 RX ADMIN — ACETAMINOPHEN 1000 MG: 500 TABLET ORAL at 05:12

## 2024-12-18 RX ADMIN — FLUOXETINE HYDROCHLORIDE 40 MG: 20 CAPSULE ORAL at 08:12

## 2024-12-18 RX ADMIN — GABAPENTIN 600 MG: 300 CAPSULE ORAL at 02:12

## 2024-12-18 RX ADMIN — POLYETHYLENE GLYCOL 3350 17 G: 17 POWDER, FOR SOLUTION ORAL at 08:12

## 2024-12-18 RX ADMIN — HYDROMORPHONE HYDROCHLORIDE 3 MG: 1 INJECTION, SOLUTION INTRAMUSCULAR; INTRAVENOUS; SUBCUTANEOUS at 03:12

## 2024-12-18 RX ADMIN — ACETAMINOPHEN 1000 MG: 500 TABLET ORAL at 01:12

## 2024-12-18 RX ADMIN — DICYCLOMINE HYDROCHLORIDE 10 MG: 10 CAPSULE ORAL at 08:12

## 2024-12-18 RX ADMIN — METOCLOPRAMIDE 5 MG: 5 TABLET ORAL at 10:12

## 2024-12-18 RX ADMIN — HYDROMORPHONE HYDROCHLORIDE 3 MG: 1 INJECTION, SOLUTION INTRAMUSCULAR; INTRAVENOUS; SUBCUTANEOUS at 10:12

## 2024-12-18 RX ADMIN — AMLODIPINE BESYLATE 10 MG: 10 TABLET ORAL at 08:12

## 2024-12-18 RX ADMIN — HEPARIN 500 UNITS: 100 SYRINGE at 05:12

## 2024-12-18 RX ADMIN — DIPHENHYDRAMINE HYDROCHLORIDE 50 MG: 50 INJECTION, SOLUTION INTRAMUSCULAR; INTRAVENOUS at 01:12

## 2024-12-18 RX ADMIN — MUPIROCIN: 20 OINTMENT TOPICAL at 08:12

## 2024-12-18 RX ADMIN — MORPHINE SULFATE 30 MG: 30 TABLET, FILM COATED, EXTENDED RELEASE ORAL at 08:12

## 2024-12-18 RX ADMIN — PANTOPRAZOLE SODIUM 40 MG: 40 TABLET, DELAYED RELEASE ORAL at 04:12

## 2024-12-18 RX ADMIN — HYDROMORPHONE HYDROCHLORIDE 3 MG: 1 INJECTION, SOLUTION INTRAMUSCULAR; INTRAVENOUS; SUBCUTANEOUS at 12:12

## 2024-12-18 RX ADMIN — GABAPENTIN 600 MG: 300 CAPSULE ORAL at 08:12

## 2024-12-18 RX ADMIN — LORAZEPAM 0.5 MG: 0.5 TABLET ORAL at 12:12

## 2024-12-18 RX ADMIN — DICYCLOMINE HYDROCHLORIDE 10 MG: 10 CAPSULE ORAL at 04:12

## 2024-12-18 RX ADMIN — SUCRALFATE 1 G: 1 SUSPENSION ORAL at 05:12

## 2024-12-18 RX ADMIN — PANTOPRAZOLE SODIUM 40 MG: 40 TABLET, DELAYED RELEASE ORAL at 05:12

## 2024-12-18 RX ADMIN — SUCRALFATE 1 G: 1 SUSPENSION ORAL at 02:12

## 2024-12-18 RX ADMIN — METOCLOPRAMIDE 5 MG: 5 TABLET ORAL at 05:12

## 2024-12-18 RX ADMIN — OXYCODONE HYDROCHLORIDE 15 MG: 10 TABLET ORAL at 01:12

## 2024-12-18 RX ADMIN — DICYCLOMINE HYDROCHLORIDE 10 MG: 10 CAPSULE ORAL at 01:12

## 2024-12-18 NOTE — CARE UPDATE
Unit [facility] Care Support Interaction      I have reviewed the chart of Nazanin Malone who is hospitalized for Vaso-occlusive pain due to sickle cell disease. The patient is currently located in the following unit: npu             I have  provided the following support:     Tether rounds - todd Reyna NP  Unit Based [facility]

## 2024-12-18 NOTE — PLAN OF CARE
A&O x4, able to make needs known and call light within reach. VSS this shift; receiving pain medication ATC -- see MAR. Loyd removed, pt voided without difficulty. BM today. Safety checks preformed.     Discharged home this afternoon; left via transport with lyft set up by [facility]. Pharmacy delivered medication to the bedside. Port deaccessed and hep locked. All belongings sent home with patient. AVS given to patient; discharge education reviewed. All questions answered and needs met.     Problem: Adult Inpatient Plan of Care  Goal: Optimal Comfort and Wellbeing  Outcome: Met

## 2024-12-18 NOTE — PROGRESS NOTES
Recommendations     1) Continue Adult regular diet      2) RD monitor wt, nutritional status, skin, and labs,      Goals: meet % of EEN/EPN by next RD f/u     Nutrition Goal Status: new

## 2024-12-18 NOTE — DISCHARGE SUMMARY
[facility] ([facility])  [facility] Medicine  Discharge Summary      Patient Name: Nazanin Malone  MRN: [id number]  [facility]: [id number]  Patient Class: IP- Inpatient  Admission Date: 2024  Hospital Length of Stay: 8 days  Discharge Date and Time: 2024  5:31 PM  Attending Physician: Brant Freed MD   Discharging Provider: Brant Freed MD  Primary Care Provider: Pee Montgomery MD  [facility] Medicine Team: [facility] [place] [facility] B Brnat Freed MD  Primary Care Team: [facility]    HPI:   Nazanin Malone is a 44 year old Black woman with sickle cell type S beta thalassemia, recurrent vaso-occlusive pain crises, chronic opioid use, avascular necrosis of the left femur, hypertension, asthma, trigeminal neuralgia, history of pulmonary embolism (anticoagulated on apixaban), history of rhabdomyolysis status post fasciotomy in , history of chronic gastritis and duodenitis on 2024, history of  section, history of tubal ligation, history of tonsillectomy. She lives in [place] [facility]. She works at [employer].   She was hospitalized at [facility] from 2024 to 12/3/2024 for vaso-occlusive pain. She returned to [facility] Emergency Department on 2024. She reported feeling good for approximately 3 days. She started having non-bloody diarrhea, loose and occurring 2 to 3 times a day, 3 days prior to presentation that she attributed to gastroenteritis, with associated nausea. She denied sick contacts. Since then, she had less oral intake and noticed increasing lower extremity and hip pain as well as abdominal pain radiating into her chest, typical of vaso-occlusive pain crisis. She ran out of pain medications a day prior to presentation. She has not yet been prescribed hydroxyurea. She takes it when she is admitted to the hospital and has not had significant side effects from it. She had associated shortness of  breath when pain was severe and intermittent headache that resolved when she was given pain medications in the emergency department. She was given IV hydromorphone. She was admitted to Hospital Medicine Team B.    * No surgery found *      Hospital Course:   She reported severe abdominal pain with nausea and diarrhea has happened with prior pain crises as well. She had infectious GI workup in November that showed chronic gastritis. She had no diarrhea for 48 hours after admission. Stool studies were cancelled. She was put on IV hydromorphone 3 mg every 3 hours. She was given multimodal analgesic regimen with diphenhydramine, sucralfate, and additional medications as needed for supportive care. Loyd catheter was placed for urinary retention. She became hypoxic. IV fluids were held. Ultrasound mesenteric ischemia study showed no evidence of hemodynamically significant stenosis of either the celiac or superior mesenteric arteries, but the inferior mesenteric artery was not well visualized. Gastroenterology was consulted. They recommended increasing pantoprazole to twice daily, adding famotidine nightly, and checking H pylori serology. H pylori IgG was negative. Abdominal ultrasound and HIDA scan showed no gallbladder disease. She reported bloating. Her opioid use can cause gastroparesis. She was put on metoclopramide. She felt some improvement. Her abdominal symptoms are likely due to known gastritis and opioid-induced gastroparesis. Unfortunately, she requires chronic opioids. She had run out prior to admission so was given a refill of 30 tablets. She also had relief of anxiety from lorazepam and reported previously being prescribed alprazolam, clonazepam, and lorazepam at different times. She was prescribed a few lorazepam. She was prescribed sucralfate and metoclopramide, as well as the increased pantoprazole.      Goals of Care Treatment Preferences:  Code Status: DNR      SDOH Screening:  The patient was screened  for food insecurity, housing instability, transportation needs, utility difficulties, and interpersonal safety. The social determinant(s) of health identified as a concern this admission are:  Housing instability  Food insecurity  Utility difficulties      Social Drivers of Health with Concerns     Food Insecurity: Food Insecurity Present (12/11/2024)   Housing Stability: High Risk (12/11/2024)   Utilities: At Risk (12/11/2024)        Consults:   Consults (From admission, onward)          Status Ordering Provider     Inpatient consult to Gastroenterology  Once        Provider:  (Not yet assigned)    FIONA Mora          Final Active Diagnoses:    Diagnosis Date Noted POA    PRINCIPAL PROBLEM:  Abdominal pain [R10.9] 10/28/2024 Yes    Urinary retention [R33.9] 12/11/2024 No    Avascular necrosis of femur [M87.059] 12/10/2024 Yes     Chronic    Hypercoagulable state [D68.59] 12/10/2024 Yes     Chronic    Chronic gastritis [K29.50] 08/22/2024 Yes     Chronic    Chronic anticoagulation [Z79.01] 08/16/2024 Not Applicable     Chronic    History of pulmonary embolism [Z86.711] 06/08/2020 Yes     Chronic    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 [D57.1, D84.81] 01/09/2020 Yes     Chronic    Anxiety and depression [F41.9, F32.A] 03/20/2018 Yes     Chronic    Obesity (BMI 30-39.9) [E66.9] 04/25/2017 Yes     Chronic    Vaso-occlusive pain due to sickle cell disease [D57.00] 04/16/2017 Yes     Chronic    Drug dependence [F19.20] 04/16/2017 Yes     Chronic    Hypertension [I10] 04/16/2017 Yes     Chronic      Problems Resolved During this Admission:    Diagnosis Date Noted Date Resolved POA    Goals of care, counseling/discussion [Z71.89] 12/10/2024 12/12/2024 Not Applicable    Dyspnea [R06.00] 12/10/2024 12/18/2024 Yes    Diarrhea [R19.7] 11/11/2024 12/18/2024 Yes    Severe obesity (BMI >= 40) [E66.01] 06/08/2020 12/10/2024 Yes    Hypokalemia [E87.6] 01/10/2020 12/12/2024 Yes       Discharged Condition:  good    Disposition: Home or Self Care    Follow Up:   Follow-up Information       Jrared Clark MD Follow up.    Specialty: Hematology and Oncology  Contact information:  [facility] [facility] [id number] [phone]                           Patient Instructions:      Diet Adult Regular     Notify your health care provider if you experience any of the following:  persistent nausea and vomiting or diarrhea     Activity as tolerated       Significant Diagnostic Studies:   X-Ray Chest AP Portable 12/09/24: FINDINGS:   Right chest wall port catheter tip projects over the distal SVC.   The cardiomediastinal silhouette is not enlarged.  There is no pleural effusion.  The trachea is midline.  The lungs are symmetrically expanded bilaterally with coarse interstitial attenuation, accentuated by habitus..  No large focal consolidation seen.  There is no pneumothorax.  The osseous structures are remarkable for degenerative change..   Impression: Interstitial findings are accentuated by habitus, no large focal consolidation.   MRI Pelvis W WO Contrast 12/10/24: FINDINGS:   Bones: Diffusely intermediate bone marrow signal focal geographic area of abnormal signal with double line margin and thin peripheral enhancement noted in the left femoral neck.  No associated bone marrow edema.  Additional punctate focus of T2 hyperintense signal seen within the proximal femur shaft.  No fracture.   Right hip: Cartilage space is maintained.  Non arthrographic evaluation of the labrum is unremarkable.  No joint effusion.   Left hip: Cartilage space is maintained.  Non arthrographic evaluation of the labrum is unremarkable.  No joint effusion.   Pelvic joints: Sacroiliac joints and pubic symphysis are unremarkable.   Muscles/tendons: Mild tendinosis of the right gluteus medius muscle.  The remaining regional muscles and tendons appear unremarkable.  No evidence for trochanteric or iliopsoas bursitis.   Miscellaneous:  Stranding involving the bilateral gluteal fat.  Subcutaneous stranding in fluid signal within the lower abdominal wall.  Findings are similar prior CT and could be related to prior injection sites.   Impression:  1. Focal osteonecrosis of left femoral neck.  Possible additional subcentimeter focus of osteonecrosis in the proximal left femoral shaft.   2. Diffuse intermediate bone marrow signal, related to sickle cell disease.   X-Ray Chest AP Portable 12/11/24: FINDINGS:   There is a right sided chest port present with the tip of the catheter projecting near the junction of the superior vena cava and right atrium.  The heart and mediastinal structures are within normal limits in size.  There is a linear density within the left lung base laterally likely representing linear atelectasis or a fibrotic streak.  There are a few linear opacities within the right lung apex as well also likely representing linear atelectasis or fibrotic streaks.  There is no evidence for pneumothorax or pleural effusions.  Bony structures are grossly intact.   Impression:  Right chest port with tip of catheter near the junction of the superior vena cava and right atrium.   Linear densities within the right lung apex and left lung base likely representing scarring or linear atelectasis in this appears stable.   No acute chest disease identified.   X-Ray Chest 1 View 12/11/24: FINDINGS:   There is a right-sided port, unchanged.  The lungs are hypoexpanded.  There is continued chronic interstitial prominence in the bilateral lungs, stable.  There is no new focal consolidation.  The pleural spaces are clear.  The cardiac silhouette is enlarged, mild.  There are overlying leads.  Osseous structures are intact.    US Mesenteric Ischemia Study 12/14/24: FINDINGS:   There is no evidence of hemodynamically significant stenosis of either the celiac or superior mesenteric arteries.  The inferior mesenteric artery is not well visualized in this study.    Velocities (measurements in cm/sec):   Aorta: 81   Celiac expiratory: 172   SMA proximal: 221   SMA Mid: 262   SMA distal: 250   RITU: Not visualized   CA/AO ratio: 2.1   SMA/AO ratio: 3.2   Proximal aorta: 2.6 x 2.6 cm   Mid aorta: 2.1 x 2.0 cm   Distal aorta: 1.7 x 1.8 cm   Right and left iliac arteries measure 1.1 x 1.1 cm.   Impression:  No definite evidence of hemodynamically significant stenosis in the superior mesenteric artery.   Inferior mesenteric artery not visualized.  No evidence of median arcuate ligament compression syndrome.   US Abdomen Limited 12/17/24: FINDINGS:   Gallbladder: No calculi, wall thickening, or pericholecystic fluid.  No sonographic Medellin's sign.   Biliary system: The common duct is not dilated, measuring 3 mm.  No intrahepatic ductal dilatation.   Pancreas: The visualized portions of pancreas appear normal.  Small peripancreatic lymph node measuring 1.1 x 12.3 cm.   Right kidney: No discrete hydronephrosis.  Mild pelviectasis similar to prior exam   Miscellaneous: No upper abdominal ascites and no abnormalities of the visualized liver.   Impression: No acute sonographic abnormality.   NM Hepatobiliary Scan (HIDA) 12/17/24: FINDINGS:   The early images demonstrate homogeneous uptake of the radiopharmaceutical by the liver with no focal hepatic abnormalities.   Normal activity is present in the common bile duct, gallbladder, and small bowel.   The clearance from the liver is good.   Impression:  The cystic and common shemar ducts are patent.  No scintigraphic evidence of acute cholecystitis       Medications:  Reconciled Home Medications:      Medication List        START taking these medications      LORazepam 0.5 MG tablet  Commonly known as: ATIVAN  Take 2 tablets (1 mg total) by mouth every 8 (eight) hours as needed for Anxiety.     methocarbamoL 750 MG Tab  Commonly known as: ROBAXIN  Take 1 tablet (750 mg total) by mouth 3 (three) times daily as needed (muscle pain).      metoclopramide HCl 5 MG tablet  Commonly known as: REGLAN  Take 1 tablet (5 mg total) by mouth 3 (three) times daily before meals.     sucralfate 100 mg/mL suspension  Commonly known as: CARAFATE  Take 10 mLs (1 g total) by mouth 4 (four) times daily before meals and nightly.            CHANGE how you take these medications      pantoprazole 40 MG tablet  Commonly known as: PROTONIX  Take 1 tablet (40 mg total) by mouth 2 (two) times daily.  What changed: when to take this            CONTINUE taking these medications      acetaminophen 500 MG tablet  Commonly known as: TYLENOL  Take 1,000 mg by mouth every 8 (eight) hours as needed for Pain.     albuterol 90 mcg/actuation inhaler  Commonly known as: VENTOLIN HFA  Inhale 2 puffs into the lungs every 6 (six) hours as needed for Wheezing or Shortness of Breath. Rescue     amLODIPine 10 MG tablet  Commonly known as: NORVASC  Take 1 tablet (10 mg total) by mouth once daily.     apixaban 5 mg Tab  Commonly known as: ELIQUIS  Take 1 tablet (5 mg total) by mouth 2 (two) times daily.     dicyclomine 10 MG capsule  Commonly known as: BENTYL  Take 1 capsule (10 mg total) by mouth 4 (four) times daily as needed (stomach cramps).     FLUoxetine 40 MG capsule  Take 1 capsule (40 mg total) by mouth once daily.     fluticasone propionate 50 mcg/actuation nasal spray  Commonly known as: FLONASE  INSTILL 1 SPRAY INTO EACH NOSTRIL EVERY DAY     folic acid 1 MG tablet  Commonly known as: FOLVITE  Take 1 tablet (1 mg total) by mouth once daily.     gabapentin 300 MG capsule  Commonly known as: NEURONTIN  Take 2 capsules (600 mg total) by mouth 3 (three) times daily.     hydroxyurea 500 mg Cap  Commonly known as: HYDREA  Take 2 capsules (1,000 mg total) by mouth once daily.     hydrOXYzine pamoate 25 MG Cap  Commonly known as: VISTARIL  Take 1 capsule (25 mg total) by mouth every 4 (four) hours as needed (Anxiety).     oxyCODONE 15 MG Tab  Commonly known as: ROXICODONE  Take 1 tablet  (15 mg total) by mouth every 4 (four) hours as needed for Pain.     senna-docusate 8.6-50 mg 8.6-50 mg per tablet  Commonly known as: PERICOLACE  Take 1 tablet by mouth daily as needed for Constipation.     VITAMIN C ORAL  Take 1 capsule by mouth once daily.            STOP taking these medications      morphine 30 MG 12 hr tablet  Commonly known as: MS CONTIN     promethazine 12.5 MG Tab  Commonly known as: PHENERGAN     tiZANidine 4 MG tablet  Commonly known as: ZANAFLEX              Indwelling Lines/Drains at time of discharge:   Lines/Drains/Airways       Central Venous Catheter Line  Duration             Port A Cath Single Lumen 12/09/24 1544 Atrial Right 8 days                    Time spent on the discharge of patient: 40 minutes         Brant Freed MD  Department of Hospital Medicine  [facility] Neurosurgery ([facility])

## 2024-12-18 NOTE — CARE UPDATE
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

## 2024-12-18 NOTE — PLAN OF CARE
CHW met with patient/family at bedside. Patient experience rounding completed and reviewed the following.     Do you know your discharge plan? Yes Home Health     If yes, what is the plan? (Home, Home Health, Rehab, SNF, LTAC, or Other)     Have you discussed your needs and preferences with your SW/CM? Yes     If you are discharging home, do you have help at home? Yes  Patient will be receiving home health assistance.    Do you think you will need help additional at home at discharge?  No   Patient has no need for additional help at the moment.    Do you currently have difficulty keeping up with bills, affording medicine or buying food? No  Patient has no difficulty with bills, food, and medicine.    Assigned SW/CM notified of any patient/family needs or concerns. Appropriate resources provided to address patient's needs.  Radha CAMEJO, RSW  Case Management  [phone]

## 2024-12-18 NOTE — PROGRESS NOTES
"[facility] ([facility])  Adult Nutrition  Progress Note    SUMMARY       Recommendations    1) Continue Adult regular diet     2) RD monitor wt, nutritional status, skin, and labs,     Goals: meet % of EEN/EPN by next RD f/u    Nutrition Goal Status: new    Communication of RD Recs: other (comment) (POC)    Assessment and Plan    No nutrition diagnosis at this time       Reason for Assessment    Reason For Assessment: length of stay    General Information Comments: 45 yo F PMH as below, notable sickle cell anemia with recurrent vaso-occlusive pain episodes requiring hospital admissions, continuous opioid use, avascular necrosis of the femurs, and pulmonary embolism on Eliquis who presents to the ED with chief complaint of abdominal and lower extremity pain consistent with vaso-occlusive pain episode.     LOS x 10 days. Pt consuming 100% of meals with normal appetite. Diarrhea resolved. Denies N/V. Reported abdominal pain she experiences is not food related. Expected discharge today.1 BM noted yesterday.     Nutrition Discharge Planning: adequate nutritional intake     Nutrition Related Social Determinants of Health: SDOH: None Identified    Nutrition Risk Screen    Nutrition Risk Screen: no indicators present    Nutrition/Diet History    Patient Reported Diet/Restrictions/Preferences: general  Spiritual, Cultural Beliefs, Jewish Practices, Values that Affect Care: no  Food Allergies: NKFA  Factors Affecting Nutritional Intake: None identified at this time    Anthropometrics    Temp: 98.1 °F (36.7 °C)  Height Method: Stated  Height: 5' 2" (157.5 cm)  Height (inches): 62 in  Weight Method: Bed Scale  Weight: 97.2 kg (214 lb 4.6 oz)  Weight (lb): 214.29 lb  Ideal Body Weight (IBW), Female: 110 lb  % Ideal Body Weight, Female (lb): 194.81 %  BMI (Calculated): 39.2       Lab/Procedures/Meds    Pertinent Labs Reviewed: reviewed       Labs:   Recent Labs   Lab 12/12/24  0447      K 3.9    "   CO2 26   BUN 9   CREATININE 1.1   GLU 99   CALCIUM 9.1   PHOS 3.6   MG 1.9       Pertinent Medications Reviewed: reviewed    Scheduled Meds:   acetaminophen  1,000 mg Oral Q8H    amLODIPine  10 mg Oral Daily    dicyclomine  10 mg Oral QID    FLUoxetine  40 mg Oral Daily    folic acid  1 mg Oral Daily    gabapentin  600 mg Oral TID    HYDROmorphone  3 mg Intravenous Q4H    hydroxyurea  1,000 mg Oral Daily    LIDOcaine  1 patch Transdermal Q24H    metoclopramide HCl  5 mg Oral TID AC    morphine  30 mg Oral Q12H    mupirocin   Nasal BID    pantoprazole  40 mg Oral BID AC    polyethylene glycol  17 g Oral Daily    sucralfate  1 g Oral Q6H       Estimated/Assessed Needs    Weight Used For Calorie Calculations: 97.2 kg (214 lb 4.6 oz)  Energy Calorie Requirements (kcal): 2430-2916kcal (25-30kcal/oz)  Energy Need Method: Kcal/kg  Protein Requirements: 97-117g (1.0-1.2g/kg)  Weight Used For Protein Calculations: 97.2 kg (214 lb 4.6 oz)  Fluid Requirements (mL): per MD  Estimated Fluid Requirement Method: RDA Method  RDA Method (mL): 2430  CHO Requirement: 303g/day      Nutrition Prescription Ordered    Current Diet Order: Regular Adult Diet    Evaluation of Received Nutrient/Fluid Intake    I/O:   Intake/Output Summary (Last 24 hours) at 12/18/2024 1455  Last data filed at 12/18/2024 0800  Gross per 24 hour   Intake --   Output 1900 ml   Net -1900 ml       Energy Calories Required: meeting needs  Protein Required: meeting needs  Fluid Required: meeting needs  Comments: LBM: 12/18  Tolerance: tolerating  % Intake of Estimated Energy Needs: 75 - 100 %  % Meal Intake: 75 - 100 %    Nutrition Risk    Level of Risk/Frequency of Follow-up: comfort care     Monitor and Evaluation    Food and Nutrient Intake: energy intake  Food and Nutrient Adminstration: diet order  Anthropometric Measurements: weight, weight change, body mass index  Biochemical Data, Medical Tests and Procedures: electrolyte and renal panel, lipid profile,  gastrointestinal profile, glucose/endocrine profile, inflammatory profile  Nutrition-Focused Physical Findings: overall appearance     Nutrition Follow-Up    RD Follow-up?: Yes    Dory Allen, Registration Eligible, Provisional LDN , MS

## 2024-12-18 NOTE — CARE UPDATE
Unit [facility] Care Support Interaction      I have reviewed the chart of Nazanin Malone who is hospitalized for Vaso-occlusive pain due to sickle cell disease. The patient is currently located in the following unit: npu        I have assisted the primary physician in management of the following:      MRSA Decolonization - Mupirocin ordered and CHG ordered         Santa Reyna NP  Unit Based [facility]

## 2024-12-19 NOTE — PLAN OF CARE
[facility] - Neurosurgery (Hospital)  Discharge Final Note    Primary Care Provider: Pee Montgomery MD    Expected Discharge Date: 12/18/2024    Final Discharge Note (most recent)       Final Note - 12/19/24 0844          Final Note    Assessment Type Final Discharge Note (P)      Anticipated Discharge Disposition Home or Self Care (P)      Hospital Resources/Appts/Education Provided Provided patient/caregiver with written discharge plan information (P)         Post-Acute Status    Other Status No Post-Acute Service Needs (P)      Patient choice form signed by patient/caregiver List with quality metrics by geographic area provided (P)      Discharge Delays None known at this time (P)                    Important Message from Medicare  Important Message from Medicare regarding Discharge Appeal Rights: Given to patient/caregiver, Explained to patient/caregiver, Signed/date by patient/caregiver     Date IMM was signed: 12/18/24  Time IMM was signed: 0922    Contact Info       Jarred Clark MD   Specialty: Hematology and Oncology    [address] [place] [id number]   Phone: [phone]       Next Steps: Follow up          No future appointments.+    Discharge Plan A and Plan B have been determined by review of patient's clinical status, future medical and therapeutic needs, and coverage/benefits for post-acute care in coordination with multidisciplinary team members.    Sw received message from RN that the pt was in need of transportation at the time of dc. Sw presented to pts bedside to confirm pts address with her and inquire if the pt had a ride. Pt reported she was in need of transportation and confirmed the address on file was correct with where the pt was going. Sw booked transportation via lyft for the pt. Pt dc home with no other needs from  at this time.     Agnieszka Goldberg [facility]  Case management   Ext. [phone]

## 2024-12-29 ENCOUNTER — HOSPITAL ENCOUNTER (INPATIENT)
Facility: HOSPITAL | Age: 44
LOS: 6 days | Discharge: HOME | DRG: 812 | End: 2025-01-05
Attending: STUDENT IN AN ORGANIZED HEALTH CARE EDUCATION/TRAINING PROGRAM | Admitting: HOSPITALIST
Payer: MEDICARE

## 2024-12-29 DIAGNOSIS — D57.00 SICKLE CELL ANEMIA WITH CRISIS: ICD-10-CM

## 2024-12-29 DIAGNOSIS — D57.00 SICKLE CELL PAIN CRISIS: ICD-10-CM

## 2024-12-29 DIAGNOSIS — R07.9 CHEST PAIN: ICD-10-CM

## 2024-12-29 DIAGNOSIS — D57.00 HB-SS DISEASE WITH VASO-OCCLUSIVE PAIN: Primary | ICD-10-CM

## 2024-12-29 DIAGNOSIS — M54.2 CERVICAL PAIN (NECK): ICD-10-CM

## 2024-12-29 LAB
ABO + RH BLD: NORMAL
ALBUMIN SERPL BCP-MCNC: 3.2 G/DL (ref 3.5–5.2)
ALP SERPL-CCNC: 76 U/L (ref 40–150)
ALT SERPL W/O P-5'-P-CCNC: 12 U/L (ref 10–44)
ANION GAP SERPL CALC-SCNC: 8 MMOL/L (ref 8–16)
AST SERPL-CCNC: 18 U/L (ref 10–40)
BASOPHILS # BLD AUTO: 0 K/UL (ref 0–0.2)
BASOPHILS NFR BLD: 0 % (ref 0–1.9)
BILIRUB SERPL-MCNC: 0.5 MG/DL (ref 0.1–1)
BILIRUB UR QL STRIP: NEGATIVE
BLD GP AB SCN CELLS X3 SERPL QL: NORMAL
BUN SERPL-MCNC: 10 MG/DL (ref 6–20)
CALCIUM SERPL-MCNC: 9.7 MG/DL (ref 8.7–10.5)
CHLORIDE SERPL-SCNC: 106 MMOL/L (ref 95–110)
CK SERPL-CCNC: 153 U/L (ref 20–180)
CLARITY UR REFRACT.AUTO: CLEAR
CO2 SERPL-SCNC: 26 MMOL/L (ref 23–29)
COLOR UR AUTO: COLORLESS
CREAT SERPL-MCNC: 1 MG/DL (ref 0.5–1.4)
DIFFERENTIAL METHOD BLD: ABNORMAL
EOSINOPHIL # BLD AUTO: 0.3 K/UL (ref 0–0.5)
EOSINOPHIL NFR BLD: 5.1 % (ref 0–8)
ERYTHROCYTE [DISTWIDTH] IN BLOOD BY AUTOMATED COUNT: 21.4 % (ref 11.5–14.5)
EST. GFR  (NO RACE VARIABLE): >60 ML/MIN/1.73 M^2
GLUCOSE SERPL-MCNC: 78 MG/DL (ref 70–110)
GLUCOSE UR QL STRIP: NEGATIVE
HCT VFR BLD AUTO: 25.2 % (ref 37–48.5)
HGB BLD-MCNC: 8.4 G/DL (ref 12–16)
HGB UR QL STRIP: NEGATIVE
IMM GRANULOCYTES # BLD AUTO: 0.02 K/UL (ref 0–0.04)
IMM GRANULOCYTES NFR BLD AUTO: 0.3 % (ref 0–0.5)
INFLUENZA A, MOLECULAR: NOT DETECTED
INFLUENZA B, MOLECULAR: NOT DETECTED
KETONES UR QL STRIP: NEGATIVE
LEUKOCYTE ESTERASE UR QL STRIP: NEGATIVE
LYMPHOCYTES # BLD AUTO: 2.8 K/UL (ref 1–4.8)
LYMPHOCYTES NFR BLD: 43.2 % (ref 18–48)
MCH RBC QN AUTO: 21.5 PG (ref 27–31)
MCHC RBC AUTO-ENTMCNC: 33.3 G/DL (ref 32–36)
MCV RBC AUTO: 65 FL (ref 82–98)
MONOCYTES # BLD AUTO: 0.8 K/UL (ref 0.3–1)
MONOCYTES NFR BLD: 11.7 % (ref 4–15)
NEUTROPHILS # BLD AUTO: 2.5 K/UL (ref 1.8–7.7)
NEUTROPHILS NFR BLD: 39.7 % (ref 38–73)
NITRITE UR QL STRIP: NEGATIVE
NRBC BLD-RTO: 3 /100 WBC
OHS QRS DURATION: 100 MS
OHS QTC CALCULATION: 472 MS
PH UR STRIP: 8 [PH] (ref 5–8)
PLATELET # BLD AUTO: 432 K/UL (ref 150–450)
PMV BLD AUTO: 9.4 FL (ref 9.2–12.9)
POTASSIUM SERPL-SCNC: 3.7 MMOL/L (ref 3.5–5.1)
PROT SERPL-MCNC: 7.1 G/DL (ref 6–8.4)
PROT UR QL STRIP: NEGATIVE
RBC # BLD AUTO: 3.9 M/UL (ref 4–5.4)
RETICS/RBC NFR AUTO: 1.7 % (ref 0.5–2.5)
RSV AG BY MOLECULAR METHOD: NOT DETECTED
SARS-COV-2 RNA RESP QL NAA+PROBE: NOT DETECTED
SODIUM SERPL-SCNC: 140 MMOL/L (ref 136–145)
SP GR UR STRIP: 1.01 (ref 1–1.03)
SPECIMEN OUTDATE: NORMAL
TROPONIN I SERPL DL<=0.01 NG/ML-MCNC: <3 NG/L (ref 0–14)
URN SPEC COLLECT METH UR: ABNORMAL
WBC # BLD AUTO: 6.41 K/UL (ref 3.9–12.7)

## 2024-12-29 PROCEDURE — 63600175 PHARM REV CODE 636 W HCPCS

## 2024-12-29 PROCEDURE — G0378 HOSPITAL OBSERVATION PER HR: HCPCS

## 2024-12-29 PROCEDURE — 81003 URINALYSIS AUTO W/O SCOPE: CPT

## 2024-12-29 PROCEDURE — 93005 ELECTROCARDIOGRAM TRACING: CPT

## 2024-12-29 PROCEDURE — 96374 THER/PROPH/DIAG INJ IV PUSH: CPT

## 2024-12-29 PROCEDURE — 96375 TX/PRO/DX INJ NEW DRUG ADDON: CPT

## 2024-12-29 PROCEDURE — 25000003 PHARM REV CODE 250: Performed by: STUDENT IN AN ORGANIZED HEALTH CARE EDUCATION/TRAINING PROGRAM

## 2024-12-29 PROCEDURE — 82550 ASSAY OF CK (CPK): CPT

## 2024-12-29 PROCEDURE — 0241U SARS-COV2 (COVID) WITH FLU/RSV BY PCR: CPT

## 2024-12-29 PROCEDURE — 84484 ASSAY OF TROPONIN QUANT: CPT

## 2024-12-29 PROCEDURE — 96361 HYDRATE IV INFUSION ADD-ON: CPT

## 2024-12-29 PROCEDURE — 86901 BLOOD TYPING SEROLOGIC RH(D): CPT

## 2024-12-29 PROCEDURE — 96376 TX/PRO/DX INJ SAME DRUG ADON: CPT

## 2024-12-29 PROCEDURE — 80053 COMPREHEN METABOLIC PANEL: CPT

## 2024-12-29 PROCEDURE — 63600175 PHARM REV CODE 636 W HCPCS: Performed by: STUDENT IN AN ORGANIZED HEALTH CARE EDUCATION/TRAINING PROGRAM

## 2024-12-29 PROCEDURE — 93010 ELECTROCARDIOGRAM REPORT: CPT | Mod: ,,, | Performed by: INTERNAL MEDICINE

## 2024-12-29 PROCEDURE — 99285 EMERGENCY DEPT VISIT HI MDM: CPT | Mod: 25

## 2024-12-29 PROCEDURE — 25000003 PHARM REV CODE 250

## 2024-12-29 PROCEDURE — 85045 AUTOMATED RETICULOCYTE COUNT: CPT

## 2024-12-29 PROCEDURE — 85025 COMPLETE CBC W/AUTO DIFF WBC: CPT

## 2024-12-29 RX ORDER — GLUCAGON 1 MG
1 KIT INJECTION
Status: DISCONTINUED | OUTPATIENT
Start: 2024-12-29 | End: 2025-01-05 | Stop reason: HOSPADM

## 2024-12-29 RX ORDER — HYDROXYUREA 500 MG/1
1000 CAPSULE ORAL DAILY
Status: DISCONTINUED | OUTPATIENT
Start: 2024-12-30 | End: 2025-01-05 | Stop reason: HOSPADM

## 2024-12-29 RX ORDER — DIPHENHYDRAMINE HCL 50 MG
50 CAPSULE ORAL EVERY 6 HOURS PRN
Status: DISCONTINUED | OUTPATIENT
Start: 2024-12-30 | End: 2024-12-29

## 2024-12-29 RX ORDER — HYDROMORPHONE HYDROCHLORIDE 1 MG/ML
2 INJECTION, SOLUTION INTRAMUSCULAR; INTRAVENOUS; SUBCUTANEOUS
Status: COMPLETED | OUTPATIENT
Start: 2024-12-29 | End: 2024-12-29

## 2024-12-29 RX ORDER — IBUPROFEN 200 MG
24 TABLET ORAL
Status: DISCONTINUED | OUTPATIENT
Start: 2024-12-29 | End: 2025-01-05 | Stop reason: HOSPADM

## 2024-12-29 RX ORDER — NALOXONE HCL 0.4 MG/ML
0.2 VIAL (ML) INJECTION
Status: DISCONTINUED | OUTPATIENT
Start: 2024-12-29 | End: 2025-01-05 | Stop reason: HOSPADM

## 2024-12-29 RX ORDER — ACETAMINOPHEN 325 MG/1
650 TABLET ORAL EVERY 4 HOURS PRN
Status: DISCONTINUED | OUTPATIENT
Start: 2024-12-29 | End: 2024-12-29

## 2024-12-29 RX ORDER — HYDROMORPHONE HYDROCHLORIDE 1 MG/ML
3 INJECTION, SOLUTION INTRAMUSCULAR; INTRAVENOUS; SUBCUTANEOUS EVERY 4 HOURS PRN
Status: DISCONTINUED | OUTPATIENT
Start: 2024-12-29 | End: 2024-12-29

## 2024-12-29 RX ORDER — ONDANSETRON 8 MG/1
8 TABLET, ORALLY DISINTEGRATING ORAL EVERY 8 HOURS PRN
Status: DISCONTINUED | OUTPATIENT
Start: 2024-12-29 | End: 2025-01-05 | Stop reason: HOSPADM

## 2024-12-29 RX ORDER — ACETAMINOPHEN 500 MG
1000 TABLET ORAL 3 TIMES DAILY
Status: DISCONTINUED | OUTPATIENT
Start: 2024-12-30 | End: 2024-12-30

## 2024-12-29 RX ORDER — PANTOPRAZOLE SODIUM 40 MG/1
40 TABLET, DELAYED RELEASE ORAL 2 TIMES DAILY
Status: DISCONTINUED | OUTPATIENT
Start: 2024-12-29 | End: 2025-01-05 | Stop reason: HOSPADM

## 2024-12-29 RX ORDER — SODIUM CHLORIDE 0.9 % (FLUSH) 0.9 %
10 SYRINGE (ML) INJECTION EVERY 12 HOURS PRN
Status: DISCONTINUED | OUTPATIENT
Start: 2024-12-29 | End: 2025-01-05 | Stop reason: HOSPADM

## 2024-12-29 RX ORDER — HYDROMORPHONE HYDROCHLORIDE 1 MG/ML
3 INJECTION, SOLUTION INTRAMUSCULAR; INTRAVENOUS; SUBCUTANEOUS EVERY 4 HOURS PRN
Status: DISCONTINUED | OUTPATIENT
Start: 2024-12-29 | End: 2024-12-30

## 2024-12-29 RX ORDER — AMOXICILLIN 250 MG
1 CAPSULE ORAL 2 TIMES DAILY
Status: DISCONTINUED | OUTPATIENT
Start: 2024-12-29 | End: 2025-01-05 | Stop reason: HOSPADM

## 2024-12-29 RX ORDER — PROCHLORPERAZINE MALEATE 10 MG
10 TABLET ORAL EVERY 6 HOURS PRN
Status: DISCONTINUED | OUTPATIENT
Start: 2024-12-29 | End: 2025-01-05 | Stop reason: HOSPADM

## 2024-12-29 RX ORDER — TALC
6 POWDER (GRAM) TOPICAL NIGHTLY PRN
Status: DISCONTINUED | OUTPATIENT
Start: 2024-12-29 | End: 2025-01-05 | Stop reason: HOSPADM

## 2024-12-29 RX ORDER — DIPHENHYDRAMINE HCL 50 MG
50 CAPSULE ORAL ONCE
Status: DISCONTINUED | OUTPATIENT
Start: 2024-12-29 | End: 2024-12-29

## 2024-12-29 RX ORDER — ACETAMINOPHEN 500 MG
1000 TABLET ORAL 3 TIMES DAILY
Status: DISCONTINUED | OUTPATIENT
Start: 2024-12-29 | End: 2024-12-29

## 2024-12-29 RX ORDER — SODIUM CHLORIDE, SODIUM LACTATE, POTASSIUM CHLORIDE, CALCIUM CHLORIDE 600; 310; 30; 20 MG/100ML; MG/100ML; MG/100ML; MG/100ML
INJECTION, SOLUTION INTRAVENOUS CONTINUOUS
Status: DISCONTINUED | OUTPATIENT
Start: 2024-12-29 | End: 2025-01-04

## 2024-12-29 RX ORDER — METHOCARBAMOL 750 MG/1
750 TABLET, FILM COATED ORAL 3 TIMES DAILY
Status: DISCONTINUED | OUTPATIENT
Start: 2024-12-29 | End: 2025-01-05 | Stop reason: HOSPADM

## 2024-12-29 RX ORDER — AMLODIPINE BESYLATE 10 MG/1
10 TABLET ORAL DAILY
Status: DISCONTINUED | OUTPATIENT
Start: 2024-12-30 | End: 2025-01-05 | Stop reason: HOSPADM

## 2024-12-29 RX ORDER — DIPHENHYDRAMINE HYDROCHLORIDE 50 MG/ML
25 INJECTION INTRAMUSCULAR; INTRAVENOUS
Status: COMPLETED | OUTPATIENT
Start: 2024-12-29 | End: 2024-12-29

## 2024-12-29 RX ORDER — POLYETHYLENE GLYCOL 3350 17 G/17G
17 POWDER, FOR SOLUTION ORAL DAILY
Status: DISCONTINUED | OUTPATIENT
Start: 2024-12-30 | End: 2025-01-05 | Stop reason: HOSPADM

## 2024-12-29 RX ORDER — DIPHENHYDRAMINE HCL 25 MG
25 CAPSULE ORAL
Status: COMPLETED | OUTPATIENT
Start: 2024-12-29 | End: 2024-12-29

## 2024-12-29 RX ORDER — OXYCODONE HYDROCHLORIDE 5 MG/1
15 TABLET ORAL ONCE
Status: COMPLETED | OUTPATIENT
Start: 2024-12-29 | End: 2024-12-29

## 2024-12-29 RX ORDER — IBUPROFEN 200 MG
16 TABLET ORAL
Status: DISCONTINUED | OUTPATIENT
Start: 2024-12-29 | End: 2025-01-05 | Stop reason: HOSPADM

## 2024-12-29 RX ORDER — ALBUTEROL SULFATE 90 UG/1
2 INHALANT RESPIRATORY (INHALATION) EVERY 6 HOURS PRN
Status: DISCONTINUED | OUTPATIENT
Start: 2024-12-29 | End: 2025-01-05 | Stop reason: HOSPADM

## 2024-12-29 RX ORDER — HYDROMORPHONE HYDROCHLORIDE 1 MG/ML
2 INJECTION, SOLUTION INTRAMUSCULAR; INTRAVENOUS; SUBCUTANEOUS EVERY 4 HOURS PRN
Status: DISCONTINUED | OUTPATIENT
Start: 2024-12-29 | End: 2024-12-29

## 2024-12-29 RX ORDER — FOLIC ACID 1 MG/1
1 TABLET ORAL DAILY
Status: DISCONTINUED | OUTPATIENT
Start: 2024-12-30 | End: 2025-01-05 | Stop reason: HOSPADM

## 2024-12-29 RX ORDER — ACETAMINOPHEN 500 MG
1000 TABLET ORAL
Status: COMPLETED | OUTPATIENT
Start: 2024-12-29 | End: 2024-12-29

## 2024-12-29 RX ADMIN — DIPHENHYDRAMINE HYDROCHLORIDE 25 MG: 25 CAPSULE ORAL at 06:12

## 2024-12-29 RX ADMIN — DIPHENHYDRAMINE HYDROCHLORIDE 25 MG: 50 INJECTION, SOLUTION INTRAMUSCULAR; INTRAVENOUS at 08:12

## 2024-12-29 RX ADMIN — HYDROMORPHONE HYDROCHLORIDE 3 MG: 1 INJECTION, SOLUTION INTRAMUSCULAR; INTRAVENOUS; SUBCUTANEOUS at 11:12

## 2024-12-29 RX ADMIN — METHOCARBAMOL TABLETS 750 MG: 750 TABLET, COATED ORAL at 10:12

## 2024-12-29 RX ADMIN — ACETAMINOPHEN 1000 MG: 500 TABLET ORAL at 06:12

## 2024-12-29 RX ADMIN — HYDROMORPHONE HYDROCHLORIDE 2 MG: 1 INJECTION, SOLUTION INTRAMUSCULAR; INTRAVENOUS; SUBCUTANEOUS at 07:12

## 2024-12-29 RX ADMIN — HYDROMORPHONE HYDROCHLORIDE 2 MG: 1 INJECTION, SOLUTION INTRAMUSCULAR; INTRAVENOUS; SUBCUTANEOUS at 05:12

## 2024-12-29 RX ADMIN — HYDROMORPHONE HYDROCHLORIDE 2 MG: 1 INJECTION, SOLUTION INTRAMUSCULAR; INTRAVENOUS; SUBCUTANEOUS at 06:12

## 2024-12-29 RX ADMIN — PANTOPRAZOLE SODIUM 40 MG: 40 TABLET, DELAYED RELEASE ORAL at 10:12

## 2024-12-29 RX ADMIN — OXYCODONE 15 MG: 5 TABLET ORAL at 10:12

## 2024-12-29 RX ADMIN — SODIUM CHLORIDE 1000 ML: 9 INJECTION, SOLUTION INTRAVENOUS at 06:12

## 2024-12-29 RX ADMIN — APIXABAN 5 MG: 5 TABLET, FILM COATED ORAL at 10:12

## 2024-12-29 RX ADMIN — DIPHENHYDRAMINE HYDROCHLORIDE 25 MG: 50 INJECTION, SOLUTION INTRAMUSCULAR; INTRAVENOUS at 11:12

## 2024-12-29 RX ADMIN — SENNOSIDES AND DOCUSATE SODIUM 1 TABLET: 50; 8.6 TABLET ORAL at 10:12

## 2024-12-29 RX ADMIN — ONDANSETRON 8 MG: 8 TABLET, ORALLY DISINTEGRATING ORAL at 11:12

## 2024-12-29 NOTE — ED PROVIDER NOTES
Encounter Date: 12/29/2024       History     Chief Complaint   Patient presents with    Sickle Cell Pain Crisis     Pt reports leg and chest pain since last night.     Nazanin Malone is a 44 y.o. 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    The patient presents to the ED due to pain related to her sickle cell disease as per her attestation.  Patient states that she has diffuse joint pain the fever, chills, rigors, feeling of malaise, dizziness or weakness.  Patient has remote history of rhabdomyolysis of the right lower extremity that has a well-healed as sclerotomy scar and denies any recent swelling.  Patient is from [place], where she would frequently get blood transfusions and plasma for recess for her condition every 6 weeks however since moving here she has become more frequently ill as there are no clinic saw here.      The history is provided by the patient and medical records. No  was used.     Review of patient's allergies indicates:   Allergen Reactions    Cat dander Anaphylaxis, Itching and Shortness Of Breath    Nsaids (non-steroidal anti-inflammatory drug) Itching and Anaphylaxis    Latex Rash     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/2017    Asthma     Avascular necrosis of femur     Depression     History of pulmonary embolism     Hypertension     Morbid obesity     Opioid dependence 04/16/2017    Pneumonia due to  Streptococcus pneumoniae 2017    Right lower lobe pneumonia 2017    Sepsis due to Streptococcus pneumoniae 2017    Sickle cell-beta thalassemia disease with pain     Trigeminal neuralgia      Past Surgical History:   Procedure Laterality Date     SECTION      ESOPHAGOGASTRODUODENOSCOPY N/A 2024    Procedure: EGD (ESOPHAGOGASTRODUODENOSCOPY);  Surgeon: Allen Marshall MD;  Location: [address]);  Service: Gastroenterology;  Laterality: N/A;    TONSILLECTOMY      TUBAL LIGATION       Family History   Problem Relation Name Age of Onset    Heart disease Mother      Diabetes Mother      Heart disease Father      Breast cancer Neg Hx      Ovarian cancer Neg Hx      Colon cancer Neg Hx       Social History     Tobacco Use    Smoking status: Never    Smokeless tobacco: Never   Substance Use Topics    Alcohol use: No    Drug use: Yes     Types: Marijuana     Comment: periodically      Review of Systems   All other systems reviewed and are negative.      Physical Exam     Initial Vitals [24 1652]   BP Pulse Resp Temp SpO2   (!) 160/94 88 16 98.2 °F (36.8 °C) 98 %      MAP       --         Physical Exam    Nursing note and vitals reviewed.  Constitutional: She appears well-developed and well-nourished. She is not diaphoretic. She appears distressed.   HENT:   Head: Normocephalic and atraumatic.   Eyes: Conjunctivae and EOM are normal. Pupils are equal, round, and reactive to light.   Neck: Neck supple.   Normal range of motion.  Cardiovascular:  Normal rate, regular rhythm, normal heart sounds and intact distal pulses.           Pulmonary/Chest: Breath sounds normal.   Abdominal: Abdomen is soft. She exhibits no distension. There is no abdominal tenderness.   Musculoskeletal:         General: Normal range of motion.      Cervical back: Normal range of motion and neck supple.      Comments: Prior eschar ostomy scar of the right lower extremity     Neurological: She is alert and  oriented to person, place, and time. She has normal strength.         ED Course   Procedures  Labs Reviewed   CBC W/ AUTO DIFFERENTIAL - Abnormal       Result Value    WBC 6.41      RBC 3.90 (*)     Hemoglobin 8.4 (*)     Hematocrit 25.2 (*)     MCV 65 (*)     MCH 21.5 (*)     MCHC 33.3      RDW 21.4 (*)     Platelets 432      MPV 9.4      Immature Granulocytes 0.3      Gran # (ANC) 2.5      Immature Grans (Abs) 0.02      Lymph # 2.8      Mono # 0.8      Eos # 0.3      Baso # 0.00      nRBC 3 (*)     Gran % 39.7      Lymph % 43.2      Mono % 11.7      Eosinophil % 5.1      Basophil % 0.0      Differential Method Automated     COMPREHENSIVE METABOLIC PANEL - Abnormal    Sodium 140      Potassium 3.7      Chloride 106      CO2 26      Glucose 78      BUN 10      Creatinine 1.0      Calcium 9.7      Total Protein 7.1      Albumin 3.2 (*)     Total Bilirubin 0.5      Alkaline Phosphatase 76      AST 18      ALT 12      eGFR >60.0      Anion Gap 8     URINALYSIS, REFLEX TO URINE CULTURE - Abnormal    Specimen UA Urine, Clean Catch      Color, UA Colorless (*)     Appearance, UA Clear      pH, UA 8.0      Specific Gravity, UA 1.010      Protein, UA Negative      Glucose, UA Negative      Ketones, UA Negative      Bilirubin (UA) Negative      Occult Blood UA Negative      Nitrite, UA Negative      Leukocytes, UA Negative      Narrative:     Specimen Source->Urine   RETICULOCYTES    Retic 1.7     TROPONIN I HIGH SENSITIVITY    Troponin I High Sensitivity <3     CK         SARS-COV2 (COVID) WITH FLU/RSV BY PCR    SARS-CoV2 (COVID-19) Qualitative PCR Not Detected      Influenza A, Molecular Not Detected      Influenza B, Molecular Not Detected      RSV Ag by Molecular Method Not Detected     TYPE & SCREEN    Group & Rh O POS      Indirect María NEG      Specimen Outdate 01/01/2025 23:59       EKG Readings: (Independently Interpreted)   Initial Reading: No STEMI. Rhythm: Normal Sinus Rhythm. Ectopy: No Ectopy.  Conduction: Normal. ST Segments: Normal ST Segments. T Waves: Normal. Clinical Impression: Normal Sinus Rhythm     ECG Results              EKG 12-lead (Final result)        Collection Time Result Time QRS Duration OHS QTC Calculation    12/29/24 17:48:14 12/29/24 21:23:50 100 472                     Final result by Interface, Lab In [facility] (12/29/24 21:23:58)                   Narrative:    Test Reason : M54.2,    Vent. Rate :  84 BPM     Atrial Rate :  84 BPM     P-R Int : 166 ms          QRS Dur : 100 ms      QT Int : 400 ms       P-R-T Axes :  54  64  54 degrees    QTcB Int : 472 ms    Normal sinus rhythm  Normal ECG  When compared with ECG of 09-Dec-2024 14:29,  Non-specific change in ST segment in Anterior leads  T wave inversion no longer evident in Inferior leads  T wave inversion no longer evident in Anterior-lateral leads  Confirmed by Abdi Bang ([id number]) on 12/29/2024 9:23:44 PM    Referred By: AAAREFERRAL SELF           Confirmed By: Abdi Bang                                  Imaging Results              X-Ray Chest AP Portable (Final result)  Result time 12/29/24 18:30:46      Final result by Melonie Verdin MD (12/29/24 18:30:46)                   Impression:      No focal consolidation.  Underlying prominence of the interstitium, which may be related to mild pulmonary vascular congestion.  Infiltrates may have a similar appearance.      Electronically signed by: Melonie Verdin  Date:    12/29/2024  Time:    18:30               Narrative:    EXAMINATION:  AP PORTABLE CHEST    CLINICAL HISTORY:  cp;    TECHNIQUE:  AP portable chest radiograph was submitted.    COMPARISON:  12/12/11/2024    FINDINGS:  There is a right central venous catheter with its tip in the superior vena cava.  AP portable chest radiograph demonstrates a cardiac silhouette within normal limits.  There is prominence of the underlying interstitium.  There is no focal consolidation, pneumothorax, or pleural effusion.  There is  mild asymmetric elevation the right hemidiaphragm.                                    X-Rays:   Independently Interpreted Readings:   Chest X-Ray: Normal heart size.  No infiltrates.  No acute abnormalities.     Medications   sodium chloride 0.9% flush 10 mL (has no administration in time range)   melatonin tablet 6 mg (has no administration in time range)   ondansetron disintegrating tablet 8 mg (has no administration in time range)   prochlorperazine tablet 10 mg (has no administration in time range)   polyethylene glycol packet 17 g (has no administration in time range)   senna-docusate 8.6-50 mg per tablet 1 tablet (has no administration in time range)   acetaminophen tablet 650 mg (has no administration in time range)   naloxone 0.4 mg/mL injection 0.2 mg (has no administration in time range)   glucose chewable tablet 16 g (has no administration in time range)   glucose chewable tablet 24 g (has no administration in time range)   dextrose 50% injection 12.5 g (has no administration in time range)   dextrose 50% injection 25 g (has no administration in time range)   glucagon (human recombinant) injection 1 mg (has no administration in time range)   HYDROmorphone injection 2 mg (2 mg Intravenous Given 12/29/24 1757)   sodium chloride 0.9% bolus 1,000 mL 1,000 mL (0 mLs Intravenous Stopped 12/29/24 1946)   acetaminophen tablet 1,000 mg (1,000 mg Oral Given 12/29/24 1814)   HYDROmorphone injection 2 mg (2 mg Intravenous Given 12/29/24 1825)   diphenhydrAMINE capsule 25 mg (25 mg Oral Given 12/29/24 1824)   HYDROmorphone injection 2 mg (2 mg Intravenous Given 12/29/24 1947)   diphenhydrAMINE injection 25 mg (25 mg Intravenous Given 12/29/24 2015)     Medical Decision Making  Patient is a 44-year-old as described above presenting with pain similar to her sickle cell type symptoms.  afebrile, hemodynamically stable, in moderate distress female presenting due to diffuse joint pain and malaise.  We will order labs, to  assess for the following diagnoses.  Pain medication given and fluids.     Ddx includes but not limited to:   ACS, pneumonia, arrhythmia, PE, pleurisy, costochondritis. Considered aortic dissection , esophageal rupture and pneumothorax although much lower likelihood at this time considering patient's history and presentation.     Update/re-evaluation: patient has negative PE CTA study, though positive D-dimer, and elevated WBC w/ left shift. Patient remains tachycardic and distressed. Antibiotics started. Patient admitted to hospital medicine for ongoing care and investigation.      Amount and/or Complexity of Data Reviewed  Labs: ordered. Decision-making details documented in ED Course.  Radiology: ordered. Decision-making details documented in ED Course.  ECG/medicine tests:  Decision-making details documented in ED Course.    Risk  OTC drugs.  Prescription drug management.  Parenteral controlled substances.  Decision regarding hospitalization.               ED Course as of 12/29/24 2129   Sun Dec 29, 2024   1800 EKG 12-lead  EKG independently interpreted by me.    Performed: 12/29/2024   Rate/Rhythm/Axis: 84 bpm, sinus rhythm, normal axis   ms  Qtc 472 ms  Impression:  Normal Sinus Rhythm.  EKG without evidence of Na channel blockade, K channel blockade, there is no prolonged QTc or digoxin effect.  There is no STEMI or signs of ischemia. [JL]   1820 Hemoglobin(!): 8.4  7.9 12 days ago [JL]   1820 Retic: 1.7 [JL]   1902 CPK: 153 [JL]   1902 Troponin I High Sensitivity: <3 [JL]   1902 X-Ray Chest AP Portable  As per my interpretation, no acute in her thoracic findings on chest x-ray. [JL]      ED Course User Index  [JL] Sara Nguyen MD                           Clinical Impression:  Final diagnoses:  [M54.2] Cervical pain (neck)  [D57.00] Sickle cell pain crisis          ED Disposition Condition    Observation                 Sara Nguyen MD  Resident  12/29/24 2129

## 2024-12-30 PROBLEM — Z86.711 HX PULMONARY EMBOLISM: Status: ACTIVE | Noted: 2024-12-30

## 2024-12-30 LAB
ALBUMIN SERPL BCP-MCNC: 3.2 G/DL (ref 3.5–5.2)
ALP SERPL-CCNC: 77 U/L (ref 40–150)
ALT SERPL W/O P-5'-P-CCNC: 12 U/L (ref 10–44)
ANION GAP SERPL CALC-SCNC: 8 MMOL/L (ref 8–16)
AST SERPL-CCNC: 18 U/L (ref 10–40)
BASOPHILS # BLD AUTO: 0.05 K/UL (ref 0–0.2)
BASOPHILS NFR BLD: 0.9 % (ref 0–1.9)
BILIRUB SERPL-MCNC: 0.5 MG/DL (ref 0.1–1)
BUN SERPL-MCNC: 10 MG/DL (ref 6–20)
CALCIUM SERPL-MCNC: 9.5 MG/DL (ref 8.7–10.5)
CHLORIDE SERPL-SCNC: 103 MMOL/L (ref 95–110)
CO2 SERPL-SCNC: 26 MMOL/L (ref 23–29)
CREAT SERPL-MCNC: 1.1 MG/DL (ref 0.5–1.4)
DIFFERENTIAL METHOD BLD: ABNORMAL
EOSINOPHIL # BLD AUTO: 0.4 K/UL (ref 0–0.5)
EOSINOPHIL NFR BLD: 7.1 % (ref 0–8)
ERYTHROCYTE [DISTWIDTH] IN BLOOD BY AUTOMATED COUNT: 21.8 % (ref 11.5–14.5)
EST. GFR  (NO RACE VARIABLE): >60 ML/MIN/1.73 M^2
GLUCOSE SERPL-MCNC: 79 MG/DL (ref 70–110)
HCT VFR BLD AUTO: 25.4 % (ref 37–48.5)
HGB BLD-MCNC: 8.2 G/DL (ref 12–16)
IMM GRANULOCYTES # BLD AUTO: 0.01 K/UL (ref 0–0.04)
IMM GRANULOCYTES NFR BLD AUTO: 0.2 % (ref 0–0.5)
LYMPHOCYTES # BLD AUTO: 3.1 K/UL (ref 1–4.8)
LYMPHOCYTES NFR BLD: 53.6 % (ref 18–48)
MAGNESIUM SERPL-MCNC: 1.9 MG/DL (ref 1.6–2.6)
MCH RBC QN AUTO: 21.5 PG (ref 27–31)
MCHC RBC AUTO-ENTMCNC: 32.3 G/DL (ref 32–36)
MCV RBC AUTO: 67 FL (ref 82–98)
MONOCYTES # BLD AUTO: 0.8 K/UL (ref 0.3–1)
MONOCYTES NFR BLD: 13.1 % (ref 4–15)
NEUTROPHILS # BLD AUTO: 1.5 K/UL (ref 1.8–7.7)
NEUTROPHILS NFR BLD: 25.1 % (ref 38–73)
NRBC BLD-RTO: 3 /100 WBC
OHS QRS DURATION: 96 MS
OHS QTC CALCULATION: 463 MS
PHOSPHATE SERPL-MCNC: 4.5 MG/DL (ref 2.7–4.5)
PLATELET # BLD AUTO: 466 K/UL (ref 150–450)
PMV BLD AUTO: 9.5 FL (ref 9.2–12.9)
POTASSIUM SERPL-SCNC: 3.7 MMOL/L (ref 3.5–5.1)
PROT SERPL-MCNC: 7.2 G/DL (ref 6–8.4)
RBC # BLD AUTO: 3.81 M/UL (ref 4–5.4)
SODIUM SERPL-SCNC: 137 MMOL/L (ref 136–145)
TROPONIN I SERPL DL<=0.01 NG/ML-MCNC: <3 NG/L (ref 0–14)
WBC # BLD AUTO: 5.8 K/UL (ref 3.9–12.7)

## 2024-12-30 PROCEDURE — 93010 ELECTROCARDIOGRAM REPORT: CPT | Mod: ,,,

## 2024-12-30 PROCEDURE — 36415 COLL VENOUS BLD VENIPUNCTURE: CPT | Performed by: HOSPITALIST

## 2024-12-30 PROCEDURE — 80053 COMPREHEN METABOLIC PANEL: CPT | Performed by: STUDENT IN AN ORGANIZED HEALTH CARE EDUCATION/TRAINING PROGRAM

## 2024-12-30 PROCEDURE — 93005 ELECTROCARDIOGRAM TRACING: CPT

## 2024-12-30 PROCEDURE — 84100 ASSAY OF PHOSPHORUS: CPT | Performed by: STUDENT IN AN ORGANIZED HEALTH CARE EDUCATION/TRAINING PROGRAM

## 2024-12-30 PROCEDURE — 25000003 PHARM REV CODE 250: Performed by: HOSPITALIST

## 2024-12-30 PROCEDURE — 85025 COMPLETE CBC W/AUTO DIFF WBC: CPT | Performed by: STUDENT IN AN ORGANIZED HEALTH CARE EDUCATION/TRAINING PROGRAM

## 2024-12-30 PROCEDURE — 25000003 PHARM REV CODE 250: Performed by: STUDENT IN AN ORGANIZED HEALTH CARE EDUCATION/TRAINING PROGRAM

## 2024-12-30 PROCEDURE — 63600175 PHARM REV CODE 636 W HCPCS: Performed by: HOSPITALIST

## 2024-12-30 PROCEDURE — 84484 ASSAY OF TROPONIN QUANT: CPT | Performed by: HOSPITALIST

## 2024-12-30 PROCEDURE — 63600175 PHARM REV CODE 636 W HCPCS: Performed by: STUDENT IN AN ORGANIZED HEALTH CARE EDUCATION/TRAINING PROGRAM

## 2024-12-30 PROCEDURE — 83735 ASSAY OF MAGNESIUM: CPT | Performed by: STUDENT IN AN ORGANIZED HEALTH CARE EDUCATION/TRAINING PROGRAM

## 2024-12-30 PROCEDURE — 11000001 HC ACUTE MED/SURG PRIVATE ROOM

## 2024-12-30 RX ORDER — ACETAMINOPHEN 325 MG/1
975 TABLET ORAL 3 TIMES DAILY
Status: DISCONTINUED | OUTPATIENT
Start: 2024-12-30 | End: 2025-01-05 | Stop reason: HOSPADM

## 2024-12-30 RX ORDER — DIPHENHYDRAMINE HYDROCHLORIDE 50 MG/ML
12.5 INJECTION INTRAMUSCULAR; INTRAVENOUS EVERY 6 HOURS PRN
Status: DISCONTINUED | OUTPATIENT
Start: 2024-12-30 | End: 2025-01-05 | Stop reason: HOSPADM

## 2024-12-30 RX ORDER — HYDROMORPHONE HYDROCHLORIDE 1 MG/ML
3 INJECTION, SOLUTION INTRAMUSCULAR; INTRAVENOUS; SUBCUTANEOUS EVERY 4 HOURS PRN
Status: DISCONTINUED | OUTPATIENT
Start: 2024-12-30 | End: 2024-12-30

## 2024-12-30 RX ORDER — HYDROMORPHONE HYDROCHLORIDE 2 MG/ML
3 INJECTION, SOLUTION INTRAMUSCULAR; INTRAVENOUS; SUBCUTANEOUS
Status: DISCONTINUED | OUTPATIENT
Start: 2024-12-30 | End: 2025-01-05 | Stop reason: HOSPADM

## 2024-12-30 RX ADMIN — HYDROMORPHONE HYDROCHLORIDE 3 MG: 2 INJECTION INTRAMUSCULAR; INTRAVENOUS; SUBCUTANEOUS at 12:12

## 2024-12-30 RX ADMIN — APIXABAN 5 MG: 5 TABLET, FILM COATED ORAL at 08:12

## 2024-12-30 RX ADMIN — SODIUM CHLORIDE, POTASSIUM CHLORIDE, SODIUM LACTATE AND CALCIUM CHLORIDE: 600; 310; 30; 20 INJECTION, SOLUTION INTRAVENOUS at 05:12

## 2024-12-30 RX ADMIN — SODIUM CHLORIDE, POTASSIUM CHLORIDE, SODIUM LACTATE AND CALCIUM CHLORIDE: 600; 310; 30; 20 INJECTION, SOLUTION INTRAVENOUS at 10:12

## 2024-12-30 RX ADMIN — METHOCARBAMOL TABLETS 750 MG: 750 TABLET, COATED ORAL at 03:12

## 2024-12-30 RX ADMIN — ACETAMINOPHEN 1000 MG: 500 TABLET ORAL at 03:12

## 2024-12-30 RX ADMIN — FOLIC ACID 1 MG: 1 TABLET ORAL at 08:12

## 2024-12-30 RX ADMIN — METHOCARBAMOL TABLETS 750 MG: 750 TABLET, COATED ORAL at 08:12

## 2024-12-30 RX ADMIN — DIPHENHYDRAMINE HYDROCHLORIDE 12.5 MG: 50 INJECTION, SOLUTION INTRAMUSCULAR; INTRAVENOUS at 10:12

## 2024-12-30 RX ADMIN — SODIUM CHLORIDE, POTASSIUM CHLORIDE, SODIUM LACTATE AND CALCIUM CHLORIDE: 600; 310; 30; 20 INJECTION, SOLUTION INTRAVENOUS at 04:12

## 2024-12-30 RX ADMIN — OXYCODONE HYDROCHLORIDE 15 MG: 10 TABLET ORAL at 08:12

## 2024-12-30 RX ADMIN — OXYCODONE HYDROCHLORIDE 15 MG: 10 TABLET ORAL at 04:12

## 2024-12-30 RX ADMIN — PANTOPRAZOLE SODIUM 40 MG: 40 TABLET, DELAYED RELEASE ORAL at 08:12

## 2024-12-30 RX ADMIN — HYDROMORPHONE HYDROCHLORIDE 3 MG: 2 INJECTION INTRAMUSCULAR; INTRAVENOUS; SUBCUTANEOUS at 09:12

## 2024-12-30 RX ADMIN — HYDROXYUREA 1000 MG: 500 CAPSULE ORAL at 08:12

## 2024-12-30 RX ADMIN — SENNOSIDES AND DOCUSATE SODIUM 1 TABLET: 50; 8.6 TABLET ORAL at 08:12

## 2024-12-30 RX ADMIN — AMLODIPINE BESYLATE 10 MG: 10 TABLET ORAL at 08:12

## 2024-12-30 RX ADMIN — Medication 6 MG: at 08:12

## 2024-12-30 RX ADMIN — HYDROMORPHONE HYDROCHLORIDE 3 MG: 2 INJECTION INTRAMUSCULAR; INTRAVENOUS; SUBCUTANEOUS at 07:12

## 2024-12-30 RX ADMIN — ACETAMINOPHEN 975 MG: 325 TABLET ORAL at 08:12

## 2024-12-30 RX ADMIN — DIPHENHYDRAMINE HYDROCHLORIDE 12.5 MG: 50 INJECTION, SOLUTION INTRAMUSCULAR; INTRAVENOUS at 04:12

## 2024-12-30 RX ADMIN — SODIUM CHLORIDE, POTASSIUM CHLORIDE, SODIUM LACTATE AND CALCIUM CHLORIDE: 600; 310; 30; 20 INJECTION, SOLUTION INTRAVENOUS at 12:12

## 2024-12-30 RX ADMIN — HYDROMORPHONE HYDROCHLORIDE 3 MG: 2 INJECTION INTRAMUSCULAR; INTRAVENOUS; SUBCUTANEOUS at 03:12

## 2024-12-30 RX ADMIN — HYDROMORPHONE HYDROCHLORIDE 3 MG: 1 INJECTION, SOLUTION INTRAMUSCULAR; INTRAVENOUS; SUBCUTANEOUS at 05:12

## 2024-12-30 RX ADMIN — HYDROMORPHONE HYDROCHLORIDE 3 MG: 2 INJECTION INTRAMUSCULAR; INTRAVENOUS; SUBCUTANEOUS at 10:12

## 2024-12-30 RX ADMIN — DIPHENHYDRAMINE HYDROCHLORIDE 12.5 MG: 50 INJECTION, SOLUTION INTRAMUSCULAR; INTRAVENOUS at 09:12

## 2024-12-30 NOTE — ED TRIAGE NOTES
Pt reports sickle cell pain mostly to chest and right leg since last night with no relief from home medications

## 2024-12-30 NOTE — ASSESSMENT & PLAN NOTE
Patient's blood pressure range in the last 24 hours was: BP  Min: 119/74  Max: 160/94.The patient's inpatient anti-hypertensive regimen is listed below:  Current Antihypertensives  amLODIPine tablet 10 mg, Daily, Oral    Plan  - Restart home meds and adjust accordingly if needed.

## 2024-12-30 NOTE — PROGRESS NOTES
[facility] - Internal Medicine Telemetry  Initial Discharge Assessment       Primary Care Provider: Pee Montgomery MD    Admission Diagnosis: Cervical pain (neck) [M54.2]  Sickle cell pain crisis [D57.00]  Chest pain [R07.9]    Admission Date: 12/29/2024  Expected Discharge Date: 1/2/2025    Transition of Care Barriers: None    Payor: [employer] MANAGED MEDICARE / Plan: [employer] MEDICARE PLAN HMO / Product Type: Medicare Advantage /     Extended Emergency Contact Information  Primary Emergency Contact: Jack Malone  Mobile Phone: [phone]  Relation: Brother    Discharge Plan A: Home  Discharge Plan B: Home with family      [facility]  [address] [facility] [address]  Phone: [phone] Fax: [phone]    [facility]  [address] [facility] [address]  [facility] [address]  Phone: [phone] Fax: [phone]      Initial Assessment (most recent)       Adult Discharge Assessment - 12/30/24 1225          Discharge Assessment    Assessment Type Discharge Planning Assessment     Confirmed/corrected address, phone number and insurance Yes     Confirmed Demographics Correct on Facesheet     Source of Information patient     When was your last doctors appointment? --   PCP is her new Hematologist bu tot cannot recall name at this time.    Communicated ALYSE with patient/caregiver Date not available/Unable to determine     Reason For Admission Vasoocclusive pain due to sickle cell disease     People in Home parent(s);grandchild(jayce);child(jayce), dependent   total two adults, two minors    Facility Arrived From: home/ED     Do you expect to return to your current living situation? Yes     Prior to hospitilization cognitive status: Alert/Oriented     Current cognitive status: Alert/Oriented     Walking or Climbing Stairs Difficulty no     Dressing/Bathing Difficulty no     Home Accessibility stairs to enter home     Number of Stairs, Main Entrance two     Stairs Comment, Main Entrance no  problems negotiating     Home Layout Able to live on 1st floor     Equipment Currently Used at Home none     Readmission within 30 days? No     Patient currently being followed by outpatient case management? No     Do you currently have service(s) that help you manage your care at home? No     Do you take prescription medications? Yes     Do you have prescription coverage? Yes     Coverage [employer] Managed Medicare/Aetna     [place]     Do you have any problems affording any of your prescribed medications? --   at times hard to afford Eliquis    Is the patient taking medications as prescribed? yes     Who is going to help you get home at discharge? Pt will need Lyft once discharged     Are you on dialysis? No     Do you take coumadin? No   Pt on Eliquis    Discharge Plan A Home     Discharge Plan B Home with family     DME Needed Upon Discharge  cane, straight     Discharge Plan discussed with: Patient     Transition of Care Barriers None                     CM spoke to pt at bedside with permission.  Role of CM as part of the interdisciplinary team and discharge planning explained. Initial assessment complete.     Admitted from home/ED  Lives with mother, minor daughter and granddaughter  PLOF independent  PCP (has established with a local hematologist  however, does not rember name at time of assessment  Pharmacy [facility]  Emergency decision maker brother Jack Malone [phone]          Discharge Plan A and Plan B have been determined by review of patient's clinical status, future medical and therapeutic needs, and coverage/benefits for post-acute care in coordination with multidisciplinary team members.

## 2024-12-30 NOTE — HOSPITAL COURSE
Ms. Malone was admitted to Hospital Medicine for management of a sickle cell pain crisis.  She was started on Dilaudid 3mg IV q3h prn and IVF.  During admission, informed by nursing patient asked if she could be given a little more than what's prescribed (not the full dosage) of her medications, no one has to know and asked if nursing needed some help to throw the items that were in their hand (the empty flush syringe and the empty med syringe). Patient pain improved and labs normalized. Patient was discharged with instructions to follow up with internal medicine and hematology to establish care. Patient was discharged with a short course of pain medication until follow ups are made.

## 2024-12-30 NOTE — ASSESSMENT & PLAN NOTE
Pt presenting w/ similar symptomology to prior events without obvious inciting cause although appears to have some gap between her prior home pain regimen and currently. No sxs c/w acute chest or other more emergent conditions.    Plan  - Pain regimen: scheduled tylenol, robaxin, and oxycodone with dilaudid PRN for breakthrough. Reassess and wean as tolerated.  - mIVF calculated for ideal body weight.  - Folic acid, hydroxyurea and other home SCD medications reordered.

## 2024-12-30 NOTE — SUBJECTIVE & OBJECTIVE
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    Asthma     Avascular necrosis of femur     Depression     History of pulmonary embolism     Hypertension     Morbid obesity     Opioid dependence 2017    Pneumonia due to Streptococcus pneumoniae 2017    Right lower lobe pneumonia 2017    Sepsis due to Streptococcus pneumoniae 2017    Sickle cell-beta thalassemia disease with pain     Trigeminal neuralgia        Past Surgical History:   Procedure Laterality Date     SECTION      ESOPHAGOGASTRODUODENOSCOPY N/A 2024    Procedure: EGD (ESOPHAGOGASTRODUODENOSCOPY);  Surgeon: Allen Marshall MD;  Location: [address]);  Service: Gastroenterology;  Laterality: N/A;    TONSILLECTOMY      TUBAL LIGATION         Review of patient's allergies indicates:   Allergen Reactions    Cat dander Anaphylaxis, Itching and Shortness Of Breath    Nsaids (non-steroidal anti-inflammatory drug) Itching and Anaphylaxis    Latex Rash       No current facility-administered medications on file prior to encounter.     Current Outpatient Medications on File Prior to Encounter   Medication Sig    acetaminophen (TYLENOL) 500 MG tablet Take 1,000 mg by mouth every 8 (eight) hours as needed for Pain.    albuterol (VENTOLIN HFA) 90 mcg/actuation inhaler Inhale 2 puffs into the lungs every 6 (six) hours as needed for Wheezing or Shortness of Breath. Rescue    amLODIPine (NORVASC) 10 MG tablet Take 1 tablet (10 mg total) by mouth once daily.    apixaban (ELIQUIS) 5 mg Tab Take 1 tablet (5 mg total) by mouth 2 (two) times daily.    ascorbic acid (VITAMIN C ORAL) Take 1 capsule by mouth once daily.    FLUoxetine 40 MG capsule Take 1 capsule (40 mg total) by mouth once daily.    fluticasone propionate (FLONASE) 50 mcg/actuation nasal spray INSTILL 1 SPRAY INTO EACH NOSTRIL EVERY DAY    folic acid (FOLVITE) 1 MG  tablet Take 1 tablet (1 mg total) by mouth once daily.    gabapentin (NEURONTIN) 300 MG capsule Take 2 capsules (600 mg total) by mouth 3 (three) times daily.    hydroxyurea (HYDREA) 500 mg Cap Take 2 capsules (1,000 mg total) by mouth once daily.    hydrOXYzine pamoate (VISTARIL) 25 MG Cap Take 1 capsule (25 mg total) by mouth every 4 (four) hours as needed (Anxiety).    LORazepam (ATIVAN) 0.5 MG tablet Take 2 tablets (1 mg total) by mouth every 8 (eight) hours as needed for Anxiety.    methocarbamoL (ROBAXIN) 750 MG Tab Take 1 tablet (750 mg total) by mouth 3 (three) times daily as needed (muscle pain).    metoclopramide HCl (REGLAN) 5 MG tablet Take 1 tablet (5 mg total) by mouth 3 (three) times daily before meals.    oxyCODONE (ROXICODONE) 15 MG Tab Take 1 tablet (15 mg total) by mouth every 4 (four) hours as needed for Pain.    pantoprazole (PROTONIX) 40 MG tablet Take 1 tablet (40 mg total) by mouth 2 (two) times daily.    promethazine (PHENERGAN) 12.5 MG Tab Take 2 tablets (25 mg total) by mouth 4 (four) times daily.    senna-docusate 8.6-50 mg (PERICOLACE) 8.6-50 mg per tablet Take 1 tablet by mouth daily as needed for Constipation.    sucralfate (CARAFATE) 1 gram tablet Take 1 tablet (1 g total) by mouth 4 (four) times daily before meals and nightly.     Family History       Problem Relation (Age of Onset)    Diabetes Mother    Heart disease Mother, Father          Tobacco Use    Smoking status: Never    Smokeless tobacco: Never   Substance and Sexual Activity    Alcohol use: No    Drug use: Yes     Types: Marijuana     Comment: periodically     Sexual activity: Not Currently     Birth control/protection: See Surgical Hx     Review of Systems   HENT:  Positive for ear pain (R > L). Negative for congestion, rhinorrhea and sore throat.    Eyes:  Negative for visual disturbance.   Respiratory:  Negative for cough, shortness of breath and wheezing.    Cardiovascular:  Negative for chest pain and palpitations.    Gastrointestinal:  Positive for abdominal pain and nausea. Negative for constipation, diarrhea and vomiting.   Genitourinary:  Negative for dysuria and hematuria.   Musculoskeletal:  Positive for myalgias.   Skin:  Negative for rash and wound.   Neurological:  Negative for dizziness, weakness, light-headedness and headaches.     Objective:     Vital Signs (Most Recent):  Temp: 99.2 °F (37.3 °C) (12/29/24 1946)  Pulse: 78 (12/29/24 2019)  Resp: 16 (12/29/24 2019)  BP: (!) 148/92 (12/29/24 2019)  SpO2: 96 % (12/29/24 2019) Vital Signs (24h Range):  Temp:  [98.2 °F (36.8 °C)-99.2 °F (37.3 °C)] 99.2 °F (37.3 °C)  Pulse:  [78-88] 78  Resp:  [16-18] 16  SpO2:  [95 %-98 %] 96 %  BP: (133-160)/(77-94) 148/92     Weight: 97.5 kg (215 lb)  Body mass index is 39.32 kg/m².     Physical Exam  Vitals reviewed.   Constitutional:       General: She is not in acute distress.  HENT:      Head: Normocephalic and atraumatic.      Ears:      Comments: BL TM mildly erythematous and scarred but no effusion or bulging.     Nose: No rhinorrhea.      Mouth/Throat:      Mouth: Mucous membranes are moist.      Pharynx: No posterior oropharyngeal erythema.   Eyes:      Conjunctiva/sclera: Conjunctivae normal.      Pupils: Pupils are equal, round, and reactive to light.   Cardiovascular:      Rate and Rhythm: Normal rate and regular rhythm.      Heart sounds: Normal heart sounds.   Pulmonary:      Effort: Pulmonary effort is normal. No respiratory distress.      Breath sounds: Normal breath sounds. No wheezing or rales.      Comments: On RA, speaking comfortably in full sentences.  Chest:      Comments: No rib or chest wall tenderness including lower left/breast area on palpation.  Abdominal:      General: Bowel sounds are normal.      Palpations: Abdomen is soft.      Tenderness: There is abdominal tenderness. There is no guarding or rebound.      Comments: Mild TTP of epigastrium and left lower quadrant.   Musculoskeletal:         General:  Normal range of motion.      Cervical back: Normal range of motion and neck supple.      Right lower leg: No edema.      Left lower leg: No edema.   Skin:     General: Skin is warm and dry.      Comments: Port of right chest C/D/I without tenderness, erythema, swelling, discharge.   Neurological:      Mental Status: She is alert and oriented to person, place, and time.   Psychiatric:         Mood and Affect: Mood normal.         Behavior: Behavior normal.              CRANIAL NERVES     CN III, IV, VI   Pupils are equal, round, and reactive to light.       Significant Labs: All pertinent labs within the past 24 hours have been reviewed.  Recent Lab Results         12/29/24 1957 12/29/24  1829   12/29/24  1756   12/29/24  1748        RSV Ag by Molecular Method   Not Detected           Influenza A, Molecular   Not Detected           Influenza B, Molecular   Not Detected           Albumin     3.2         ALP     76         ALT     12         Anion Gap     8         Appearance, UA Clear             AST     18         Baso #     0.00         Basophil %     0.0         Bilirubin (UA) Negative             BILIRUBIN TOTAL     0.5  Comment: For infants and newborns, interpretation of results should be based  on gestational age, weight and in agreement with clinical  observations.    Premature Infant recommended reference ranges:  Up to 24 hours.............<8.0 mg/dL  Up to 48 hours............<12.0 mg/dL  3-5 days..................<15.0 mg/dL  6-29 days.................<15.0 mg/dL           BUN     10         Calcium     9.7         Chloride     106         CO2     26         Color, UA Colorless             CPK     153         Creatinine     1.0         Differential Method     Automated         eGFR     >60.0         Eos #     0.3         Eos %     5.1         Glucose     78         Glucose, UA Negative             Gran # (ANC)     2.5         Gran %     39.7         Group & Rh     O POS         Hematocrit     25.2          Hemoglobin     8.4         Immature Grans (Abs)     0.02  Comment: Mild elevation in immature granulocytes is non specific and   can be seen in a variety of conditions including stress response,   acute inflammation, trauma and pregnancy. Correlation with other   laboratory and clinical findings is essential.           Immature Granulocytes     0.3         INDIRECT MER     NEG         Ketones, UA Negative             Leukocyte Esterase, UA Negative             Lymph #     2.8         Lymph %     43.2         MCH     21.5         MCHC     33.3         MCV     65         Mono #     0.8         Mono %     11.7         MPV     9.4         NITRITE UA Negative             nRBC     3         Blood, UA Negative             QRS Duration       100       OHS QTC Calculation       472       pH, UA 8.0             Platelet Count     432         Potassium     3.7         PROTEIN TOTAL     7.1         Protein, UA Negative  Comment: Recommend a 24 hour urine protein or a urine   protein/creatinine ratio if globulin induced proteinuria is  clinically suspected.               RBC     3.90         RDW     21.4         Retic     1.7         SARS-CoV2 (COVID-19) Qualitative PCR   Not Detected  Comment: This test utilizes a real-time reverse transcription  polymerase chain reaction procedure to amplify and  detect the SARS-CoV-2 and detect the SARS-CoV-2 N2 and E nucleic  acid targets. The analytical sensitivity (limit of detection) of  this assay is 250 copies/mL.    A Detected result implies that the patient is infected with the  SARS-CoV-2 virus and is presumed to be contagious.  A Not Detected result implies that the SARS-CoV-2 target nucleic  acids are not present above the limit of detection. It does not  rule out the possibility of COVID-19 and should not be the sole  basis for treatment decisions. If COVID-19 is strongly suspected  based on clinical and epidemiological history, re-testing should  be considered.    This test  is Food and Drug Administration (FDA) approved. Performance   characteristics of this has been independently verified by [facility] Department of Pathology and Laboratory Medicine.             Sodium     140         Spec Grav UA 1.010             Specimen Outdate     01/01/2025 23:59         Specimen UA Urine, Clean Catch             Troponin I High Sensitivity     <3         WBC     6.41                 Significant Imaging: I have reviewed all pertinent imaging results/findings within the past 24 hours.  Imaging Results              X-Ray Chest AP Portable (Final result)  Result time 12/29/24 18:30:46      Final result by Melonie Verdin MD (12/29/24 18:30:46)                   Impression:      No focal consolidation.  Underlying prominence of the interstitium, which may be related to mild pulmonary vascular congestion.  Infiltrates may have a similar appearance.      Electronically signed by: Melonie Verdin  Date:    12/29/2024  Time:    18:30               Narrative:    EXAMINATION:  AP PORTABLE CHEST    CLINICAL HISTORY:  cp;    TECHNIQUE:  AP portable chest radiograph was submitted.    COMPARISON:  12/12/11/2024    FINDINGS:  There is a right central venous catheter with its tip in the superior vena cava.  AP portable chest radiograph demonstrates a cardiac silhouette within normal limits.  There is prominence of the underlying interstitium.  There is no focal consolidation, pneumothorax, or pleural effusion.  There is mild asymmetric elevation the right hemidiaphragm.

## 2024-12-30 NOTE — HPI
By Dr. Nathalia Mitchell MD    Nazanin Malone is a 44 y.o. female with sickle cell beta thalassemia zero, multiple pulm emboli on chronic AC, asthma, and HTN who presents for pain in the left lower ribcage area of her chest and left breast as well as her BLE which began 1-2 days ago, feels c/w her prior pain crises, and has been worsening since despite taking her home regimen of tylenol, oxycodone 15mg, and muscle relaxers. She states that she did feel warm at one point and her temp was 100.8 but no higher temps than that. She also reports some ear pain BL but R>L. Reports she has been around someone who had the flu recently. Some nausea but no vomiting. She does have port of her right chest that was last accessed ~12/18 when she was in the hospital for a pain crisis. Denies any pain, redness, swelling, discharge of her port site. Denied cough, sore throat, shortness of breath, wheezing, abd pain, dysuria, headaches, weakness.     She reports that she moved to the area about 6 weeks ago after living in [place]. She was receiving chronic exchange transfusions there but is not currently receiving them as part of her therapy here. She is seeing a hematologist. She also reports that she previously was on MS contin, pen VK, and hydroxurea as part of her home regimen but has not been put back on those since she has moved here.

## 2024-12-30 NOTE — NURSING
Received admit from er via stretcher 43 y/o bf with dx of sickle cell pain crisis. Aaox4, no distress noted. No complaints at present. Will continue to monitor.

## 2024-12-30 NOTE — PROVIDER PROGRESS NOTES - EMERGENCY DEPT.
Encounter Date: 12/29/2024    ED Physician Progress Notes        Physician Note:   ED RESIDENT  PHYSICIAN HAND-OFF NOTE:  7:50 PM 12/29/2024  Nazanin Malone is a 44 y.o. female who presented to the ED on 12/29/2024 and has been managed by , who reports patient C/O Sickle cell pain crisis. I assumed care of patient from off-going ED physician team at 7:50 PM pending pain control.    On my evaluation, Nazanin Malone appears well, hemodynamically stable and in NAD. Thus far, Nazanin Malone has received:  Medications  HYDROmorphone injection 2 mg (has no administration in time range)  HYDROmorphone injection 2 mg (2 mg Intravenous Given 12/29/24 1757)  sodium chloride 0.9% bolus 1,000 mL 1,000 mL (0 mLs Intravenous Stopped 12/29/24 1946)  acetaminophen tablet 1,000 mg (1,000 mg Oral Given 12/29/24 1814)  HYDROmorphone injection 2 mg (2 mg Intravenous Given 12/29/24 1825)  diphenhydrAMINE capsule 25 mg (25 mg Oral Given 12/29/24 1824)    On my exam, I appreciate:  Constitutional: She appears well-developed and well-nourished. She is not diaphoretic. She appears distressed.   HENT:   Head: Normocephalic and atraumatic.   Eyes: Conjunctivae and EOM are normal. Pupils are equal, round, and reactive to light.   Neck: Neck supple.   Normal range of motion.  Cardiovascular:  Normal rate, regular rhythm, normal heart sounds and intact distal pulses.           Pulmonary/Chest: Breath sounds normal.   Abdominal: Abdomen is soft. She exhibits no distension. There is no abdominal tenderness.   Musculoskeletal:         General: Normal range of motion.      Cervical back: Normal range of motion and neck supple.      Comments: Prior eschar ostomy scar of the right lower extremity      Neurological: She is alert and oriented to person, place, and time. She has normal strength.       ED Course as of 12/29/24 1950  ------------------------------------------------------------  Time: 12/29 1800  Value: EKG  12-lead  Comment: EKG independently interpreted by me.Performed: 12/29/2024 Rate/Rhythm/Axis: 84 bpm, sinus rhythm, normal axisQRS 100 msQtc 472 msImpression:  Normal Sinus Rhythm.  EKG without evidence of Na channel blockade, K channel blockade, there is no prolonged QTc or digoxin effect.  There is no STEMI or signs of ischemia.  By: Sara Nguyen MD  ------------------------------------------------------------  Time: 12/29 1820  Value: Hemoglobin(!): 8.4  Comment: 7.9 12 days ago  By: Sara Nguyen MD  ------------------------------------------------------------  Time: 12/29 1820  Value: Retic: 1.7  Comment: (Reviewed)  By: Sara Nguyen MD  ------------------------------------------------------------  Time: 12/29 1902  Value: CPK: 153  Comment: (Reviewed)  By: Sara Nguyen MD  ------------------------------------------------------------  Time: 12/29 1902  Value: Troponin I High Sensitivity: <3  Comment: (Reviewed)  By: Sara Nguyen MD  ------------------------------------------------------------  Time: 12/29 1902  Value: X-Ray Chest AP Portable  Comment: As per my interpretation, no acute in her thoracic findings on chest x-ray.  By: Sara Nguyen MD   Patient 2 rounds of pain medicine and I will be giving the 3rd.  Plan is to admit the patient if 3rd round of pain medicine doesn't help control the pain.  We discussed the case with Hospital Medicine and the patient will be admitted to their service.  Disposition: I anticipate patient will be admitted  I have discussed and counseled Nazanin Malone regarding exam, results, diagnosis, treatment, and plan.  ______________________  Onur Palomares,   Emergency Medicine [id number]  7:50 PM 12/29/2024

## 2024-12-30 NOTE — CARE UPDATE
Hospital Medicine   Care Update Note      Seen on rounds this morning, 12/30/2024.  Endorses 10/10 pain in chest and legs.  Requests for Dilaudid to be changed to q3h prn.  Benadryl IV ordered.    Vitals:    12/30/24 0923   BP:    Pulse:    Resp: 18   Temp:        Recent Labs   Lab 12/30/24  0512   WBC 5.80   RBC 3.81*   HGB 8.2*   HCT 25.4*   *   MCV 67*   MCH 21.5*   MCHC 32.3            Recent Labs   Lab 12/30/24  0512   CALCIUM 9.5   ALBUMIN 3.2*   PROT 7.2      K 3.7   CO2 26      BUN 10   CREATININE 1.1   ALKPHOS 77   ALT 12   AST 18   BILITOT 0.5       Samira De La Cruz MD, [facility]  Senior Physician  [facility] Medicine

## 2024-12-30 NOTE — H&P
[facility] Medicine  History & Physical    Patient Name: Nazanin Malone  MRN: [id number]  Patient Class: OP- Observation  Admission Date: 12/29/2024  Attending Physician: Samira De La Cruz MD   Primary Care Provider: Pee Montgomery MD         Patient information was obtained from patient, past medical records, and ER records.     Subjective:     Principal Problem:<principal problem not specified>    Chief Complaint:   Chief Complaint   Patient presents with    Sickle Cell Pain Crisis     Pt reports leg and chest pain since last night.        HPI: Nazanin Malone is a 44 y.o. female with sickle cell beta thalassemia zero, multiple pulm emboli on chronic AC, asthma, and HTN who presents for pain in the left lower ribcage area of her chest and left breast as well as her BLE which began 1-2 days ago, feels c/w her prior pain crises, and has been worsening since despite taking her home regimen of tylenol, oxycodone 15mg, and muscle relaxers. She states that she did feel warm at one point and her temp was 100.8 but no higher temps than that. She also reports some ear pain BL but R>L. Reports she has been around someone who had the flu recently. Some nausea but no vomiting. She does have port of her right chest that was last accessed ~12/18 when she was in the hospital for a pain crisis. Denies any pain, redness, swelling, discharge of her port site. Denied cough, sore throat, shortness of breath, wheezing, abd pain, dysuria, headaches, weakness.     She reports that she moved to the area about 6 weeks ago after living in [place]. She was receiving chronic exchange transfusions there but is not currently receiving them as part of her therapy here. She is seeing a hematologist. She also reports that she previously was on MS contin, pen VK, and hydroxurea as part of her home regimen but has not been put back on those since she has moved here.     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    Asthma     Avascular necrosis of femur     Depression     History of pulmonary embolism     Hypertension     Morbid obesity     Opioid dependence 2017    Pneumonia due to Streptococcus pneumoniae 2017    Right lower lobe pneumonia 2017    Sepsis due to Streptococcus pneumoniae 2017    Sickle cell-beta thalassemia disease with pain     Trigeminal neuralgia        Past Surgical History:   Procedure Laterality Date     SECTION      ESOPHAGOGASTRODUODENOSCOPY N/A 2024    Procedure: EGD (ESOPHAGOGASTRODUODENOSCOPY);  Surgeon: Allen Marshall MD;  Location: [address]);  Service: Gastroenterology;  Laterality: N/A;    TONSILLECTOMY      TUBAL LIGATION         Review of patient's allergies indicates:   Allergen Reactions    Cat dander Anaphylaxis, Itching and Shortness Of Breath    Nsaids (non-steroidal anti-inflammatory drug) Itching and Anaphylaxis    Latex Rash       No current facility-administered medications on file prior to encounter.     Current Outpatient Medications on File Prior to Encounter   Medication Sig    acetaminophen (TYLENOL) 500 MG tablet Take 1,000 mg by mouth every 8 (eight) hours as needed for Pain.    albuterol (VENTOLIN HFA) 90 mcg/actuation inhaler Inhale 2 puffs into the lungs every 6 (six) hours as needed for Wheezing or Shortness of Breath. Rescue    amLODIPine (NORVASC) 10 MG tablet Take 1 tablet (10 mg total) by mouth once daily.    apixaban (ELIQUIS) 5 mg Tab Take 1 tablet (5 mg total) by mouth 2 (two) times daily.    ascorbic acid (VITAMIN C ORAL) Take 1 capsule by mouth once daily.    FLUoxetine 40 MG capsule Take 1 capsule (40 mg total) by mouth once daily.    fluticasone propionate (FLONASE) 50 mcg/actuation nasal spray INSTILL 1 SPRAY INTO EACH NOSTRIL EVERY DAY    folic acid (FOLVITE) 1 MG tablet Take 1 tablet  (1 mg total) by mouth once daily.    gabapentin (NEURONTIN) 300 MG capsule Take 2 capsules (600 mg total) by mouth 3 (three) times daily.    hydroxyurea (HYDREA) 500 mg Cap Take 2 capsules (1,000 mg total) by mouth once daily.    hydrOXYzine pamoate (VISTARIL) 25 MG Cap Take 1 capsule (25 mg total) by mouth every 4 (four) hours as needed (Anxiety).    LORazepam (ATIVAN) 0.5 MG tablet Take 2 tablets (1 mg total) by mouth every 8 (eight) hours as needed for Anxiety.    methocarbamoL (ROBAXIN) 750 MG Tab Take 1 tablet (750 mg total) by mouth 3 (three) times daily as needed (muscle pain).    metoclopramide HCl (REGLAN) 5 MG tablet Take 1 tablet (5 mg total) by mouth 3 (three) times daily before meals.    oxyCODONE (ROXICODONE) 15 MG Tab Take 1 tablet (15 mg total) by mouth every 4 (four) hours as needed for Pain.    pantoprazole (PROTONIX) 40 MG tablet Take 1 tablet (40 mg total) by mouth 2 (two) times daily.    promethazine (PHENERGAN) 12.5 MG Tab Take 2 tablets (25 mg total) by mouth 4 (four) times daily.    senna-docusate 8.6-50 mg (PERICOLACE) 8.6-50 mg per tablet Take 1 tablet by mouth daily as needed for Constipation.    sucralfate (CARAFATE) 1 gram tablet Take 1 tablet (1 g total) by mouth 4 (four) times daily before meals and nightly.     Family History       Problem Relation (Age of Onset)    Diabetes Mother    Heart disease Mother, Father          Tobacco Use    Smoking status: Never    Smokeless tobacco: Never   Substance and Sexual Activity    Alcohol use: No    Drug use: Yes     Types: Marijuana     Comment: periodically     Sexual activity: Not Currently     Birth control/protection: See Surgical Hx     Review of Systems   HENT:  Positive for ear pain (R > L). Negative for congestion, rhinorrhea and sore throat.    Eyes:  Negative for visual disturbance.   Respiratory:  Negative for cough, shortness of breath and wheezing.    Cardiovascular:  Negative for chest pain and palpitations.   Gastrointestinal:   Positive for abdominal pain and nausea. Negative for constipation, diarrhea and vomiting.   Genitourinary:  Negative for dysuria and hematuria.   Musculoskeletal:  Positive for myalgias.   Skin:  Negative for rash and wound.   Neurological:  Negative for dizziness, weakness, light-headedness and headaches.     Objective:     Vital Signs (Most Recent):  Temp: 99.2 °F (37.3 °C) (12/29/24 1946)  Pulse: 78 (12/29/24 2019)  Resp: 16 (12/29/24 2019)  BP: (!) 148/92 (12/29/24 2019)  SpO2: 96 % (12/29/24 2019) Vital Signs (24h Range):  Temp:  [98.2 °F (36.8 °C)-99.2 °F (37.3 °C)] 99.2 °F (37.3 °C)  Pulse:  [78-88] 78  Resp:  [16-18] 16  SpO2:  [95 %-98 %] 96 %  BP: (133-160)/(77-94) 148/92     Weight: 97.5 kg (215 lb)  Body mass index is 39.32 kg/m².     Physical Exam  Vitals reviewed.   Constitutional:       General: She is not in acute distress.  HENT:      Head: Normocephalic and atraumatic.      Ears:      Comments: BL TM mildly erythematous and scarred but no effusion or bulging.     Nose: No rhinorrhea.      Mouth/Throat:      Mouth: Mucous membranes are moist.      Pharynx: No posterior oropharyngeal erythema.   Eyes:      Conjunctiva/sclera: Conjunctivae normal.      Pupils: Pupils are equal, round, and reactive to light.   Cardiovascular:      Rate and Rhythm: Normal rate and regular rhythm.      Heart sounds: Normal heart sounds.   Pulmonary:      Effort: Pulmonary effort is normal. No respiratory distress.      Breath sounds: Normal breath sounds. No wheezing or rales.      Comments: On RA, speaking comfortably in full sentences.  Chest:      Comments: No rib or chest wall tenderness including lower left/breast area on palpation.  Abdominal:      General: Bowel sounds are normal.      Palpations: Abdomen is soft.      Tenderness: There is abdominal tenderness. There is no guarding or rebound.      Comments: Mild TTP of epigastrium and left lower quadrant.   Musculoskeletal:         General: Normal range of  motion.      Cervical back: Normal range of motion and neck supple.      Right lower leg: No edema.      Left lower leg: No edema.   Skin:     General: Skin is warm and dry.      Comments: Port of right chest C/D/I without tenderness, erythema, swelling, discharge.   Neurological:      Mental Status: She is alert and oriented to person, place, and time.   Psychiatric:         Mood and Affect: Mood normal.         Behavior: Behavior normal.              CRANIAL NERVES     CN III, IV, VI   Pupils are equal, round, and reactive to light.       Significant Labs: All pertinent labs within the past 24 hours have been reviewed.  Recent Lab Results         12/29/24 1957 12/29/24  1829   12/29/24  1756   12/29/24  1748        RSV Ag by Molecular Method   Not Detected           Influenza A, Molecular   Not Detected           Influenza B, Molecular   Not Detected           Albumin     3.2         ALP     76         ALT     12         Anion Gap     8         Appearance, UA Clear             AST     18         Baso #     0.00         Basophil %     0.0         Bilirubin (UA) Negative             BILIRUBIN TOTAL     0.5  Comment: For infants and newborns, interpretation of results should be based  on gestational age, weight and in agreement with clinical  observations.    Premature Infant recommended reference ranges:  Up to 24 hours.............<8.0 mg/dL  Up to 48 hours............<12.0 mg/dL  3-5 days..................<15.0 mg/dL  6-29 days.................<15.0 mg/dL           BUN     10         Calcium     9.7         Chloride     106         CO2     26         Color, UA Colorless             CPK     153         Creatinine     1.0         Differential Method     Automated         eGFR     >60.0         Eos #     0.3         Eos %     5.1         Glucose     78         Glucose, UA Negative             Gran # (ANC)     2.5         Gran %     39.7         Group & Rh     O POS         Hematocrit     25.2         Hemoglobin      8.4         Immature Grans (Abs)     0.02  Comment: Mild elevation in immature granulocytes is non specific and   can be seen in a variety of conditions including stress response,   acute inflammation, trauma and pregnancy. Correlation with other   laboratory and clinical findings is essential.           Immature Granulocytes     0.3         INDIRECT MER     NEG         Ketones, UA Negative             Leukocyte Esterase, UA Negative             Lymph #     2.8         Lymph %     43.2         MCH     21.5         MCHC     33.3         MCV     65         Mono #     0.8         Mono %     11.7         MPV     9.4         NITRITE UA Negative             nRBC     3         Blood, UA Negative             QRS Duration       100       OHS QTC Calculation       472       pH, UA 8.0             Platelet Count     432         Potassium     3.7         PROTEIN TOTAL     7.1         Protein, UA Negative  Comment: Recommend a 24 hour urine protein or a urine   protein/creatinine ratio if globulin induced proteinuria is  clinically suspected.               RBC     3.90         RDW     21.4         Retic     1.7         SARS-CoV2 (COVID-19) Qualitative PCR   Not Detected  Comment: This test utilizes a real-time reverse transcription  polymerase chain reaction procedure to amplify and  detect the SARS-CoV-2 and detect the SARS-CoV-2 N2 and E nucleic  acid targets. The analytical sensitivity (limit of detection) of  this assay is 250 copies/mL.    A Detected result implies that the patient is infected with the  SARS-CoV-2 virus and is presumed to be contagious.  A Not Detected result implies that the SARS-CoV-2 target nucleic  acids are not present above the limit of detection. It does not  rule out the possibility of COVID-19 and should not be the sole  basis for treatment decisions. If COVID-19 is strongly suspected  based on clinical and epidemiological history, re-testing should  be considered.    This test is Food and Drug  Administration (FDA) approved. Performance   characteristics of this has been independently verified by [facility] Department of Pathology and Laboratory Medicine.             Sodium     140         Spec Grav UA 1.010             Specimen Outdate     01/01/2025 23:59         Specimen UA Urine, Clean Catch             Troponin I High Sensitivity     <3         WBC     6.41                 Significant Imaging: I have reviewed all pertinent imaging results/findings within the past 24 hours.  Imaging Results              X-Ray Chest AP Portable (Final result)  Result time 12/29/24 18:30:46      Final result by Melonie Verdin MD (12/29/24 18:30:46)                   Impression:      No focal consolidation.  Underlying prominence of the interstitium, which may be related to mild pulmonary vascular congestion.  Infiltrates may have a similar appearance.      Electronically signed by: Melonie Verdin  Date:    12/29/2024  Time:    18:30               Narrative:    EXAMINATION:  AP PORTABLE CHEST    CLINICAL HISTORY:  cp;    TECHNIQUE:  AP portable chest radiograph was submitted.    COMPARISON:  12/12/11/2024    FINDINGS:  There is a right central venous catheter with its tip in the superior vena cava.  AP portable chest radiograph demonstrates a cardiac silhouette within normal limits.  There is prominence of the underlying interstitium.  There is no focal consolidation, pneumothorax, or pleural effusion.  There is mild asymmetric elevation the right hemidiaphragm.                                      Assessment/Plan:     Hx pulmonary embolism  Multiple emboli noted in past.    Plan  - Continue home anticoagulation.    Chronic gastritis  Plan  - Continue home PPI.      Intermittent asthma  Plan  - Continue home albuterol PRN.      Hypertension  Patient's blood pressure range in the last 24 hours was: BP  Min: 119/74  Max: 160/94.The patient's inpatient anti-hypertensive regimen is listed  below:  Current Antihypertensives  amLODIPine tablet 10 mg, Daily, Oral    Plan  - Restart home meds and adjust accordingly if needed.    Vaso-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 No sxs c/w acute chest or other more emergent conditions.    Plan  - Pain regimen: scheduled tylenol, robaxin, and oxycodone with dilaudid PRN for breakthrough. Reassess and wean as tolerated.  - mIVF calculated for ideal body weight.  - Folic acid, hydroxyurea and other home SCD medications reordered.      VTE Risk Mitigation (From admission, onward)           Ordered     apixaban tablet 5 mg  2 times daily         12/29/24 2131     Reason for No Pharmacological VTE Prophylaxis  Once        Question:  Reasons:  Answer:  Already adequately anticoagulated on oral Anticoagulants    12/29/24 2046     IP VTE HIGH RISK PATIENT  Once         12/29/24 2046     Place sequential compression device  Until discontinued         12/29/24 2046                       On 12/30/2024, patient should be placed in hospital observation services under my care.             Nathalia Mitchell DO  Department of Hospital Medicine  [facility] Medicine Telemetry

## 2024-12-31 PROBLEM — Z79.891 CHRONIC PRESCRIPTION OPIATE USE: Status: ACTIVE | Noted: 2024-12-31

## 2024-12-31 LAB — RETICS/RBC NFR AUTO: 2 % (ref 0.5–2.5)

## 2024-12-31 PROCEDURE — 25000003 PHARM REV CODE 250: Performed by: STUDENT IN AN ORGANIZED HEALTH CARE EDUCATION/TRAINING PROGRAM

## 2024-12-31 PROCEDURE — 36415 COLL VENOUS BLD VENIPUNCTURE: CPT | Performed by: STUDENT IN AN ORGANIZED HEALTH CARE EDUCATION/TRAINING PROGRAM

## 2024-12-31 PROCEDURE — 11000001 HC ACUTE MED/SURG PRIVATE ROOM

## 2024-12-31 PROCEDURE — 25000003 PHARM REV CODE 250

## 2024-12-31 PROCEDURE — 63600175 PHARM REV CODE 636 W HCPCS: Performed by: HOSPITALIST

## 2024-12-31 PROCEDURE — 25000003 PHARM REV CODE 250: Performed by: HOSPITALIST

## 2024-12-31 PROCEDURE — 85045 AUTOMATED RETICULOCYTE COUNT: CPT | Performed by: STUDENT IN AN ORGANIZED HEALTH CARE EDUCATION/TRAINING PROGRAM

## 2024-12-31 PROCEDURE — 63600175 PHARM REV CODE 636 W HCPCS: Performed by: STUDENT IN AN ORGANIZED HEALTH CARE EDUCATION/TRAINING PROGRAM

## 2024-12-31 RX ORDER — CALCIUM CARBONATE 200(500)MG
1000 TABLET,CHEWABLE ORAL 3 TIMES DAILY PRN
Status: DISCONTINUED | OUTPATIENT
Start: 2024-12-31 | End: 2025-01-05 | Stop reason: HOSPADM

## 2024-12-31 RX ORDER — HYDROXYZINE HYDROCHLORIDE 25 MG/1
25 TABLET, FILM COATED ORAL ONCE
Status: COMPLETED | OUTPATIENT
Start: 2024-12-31 | End: 2024-12-31

## 2024-12-31 RX ORDER — SUCRALFATE 1 G/1
1 TABLET ORAL
Status: DISCONTINUED | OUTPATIENT
Start: 2024-12-31 | End: 2025-01-05 | Stop reason: HOSPADM

## 2024-12-31 RX ADMIN — FOLIC ACID 1 MG: 1 TABLET ORAL at 08:12

## 2024-12-31 RX ADMIN — AMLODIPINE BESYLATE 10 MG: 10 TABLET ORAL at 08:12

## 2024-12-31 RX ADMIN — SUCRALFATE 1 G: 1 TABLET ORAL at 08:12

## 2024-12-31 RX ADMIN — OXYCODONE HYDROCHLORIDE 15 MG: 10 TABLET ORAL at 10:12

## 2024-12-31 RX ADMIN — DIPHENHYDRAMINE HYDROCHLORIDE 12.5 MG: 50 INJECTION, SOLUTION INTRAMUSCULAR; INTRAVENOUS at 12:12

## 2024-12-31 RX ADMIN — HYDROMORPHONE HYDROCHLORIDE 3 MG: 2 INJECTION INTRAMUSCULAR; INTRAVENOUS; SUBCUTANEOUS at 06:12

## 2024-12-31 RX ADMIN — SUCRALFATE 1 G: 1 TABLET ORAL at 04:12

## 2024-12-31 RX ADMIN — HYDROMORPHONE HYDROCHLORIDE 3 MG: 2 INJECTION INTRAMUSCULAR; INTRAVENOUS; SUBCUTANEOUS at 04:12

## 2024-12-31 RX ADMIN — HYDROMORPHONE HYDROCHLORIDE 3 MG: 2 INJECTION INTRAMUSCULAR; INTRAVENOUS; SUBCUTANEOUS at 11:12

## 2024-12-31 RX ADMIN — OXYCODONE HYDROCHLORIDE 15 MG: 10 TABLET ORAL at 08:12

## 2024-12-31 RX ADMIN — APIXABAN 5 MG: 5 TABLET, FILM COATED ORAL at 08:12

## 2024-12-31 RX ADMIN — HYDROMORPHONE HYDROCHLORIDE 3 MG: 2 INJECTION INTRAMUSCULAR; INTRAVENOUS; SUBCUTANEOUS at 08:12

## 2024-12-31 RX ADMIN — SUCRALFATE 1 G: 1 TABLET ORAL at 10:12

## 2024-12-31 RX ADMIN — HYDROMORPHONE HYDROCHLORIDE 3 MG: 2 INJECTION INTRAMUSCULAR; INTRAVENOUS; SUBCUTANEOUS at 01:12

## 2024-12-31 RX ADMIN — SENNOSIDES AND DOCUSATE SODIUM 1 TABLET: 50; 8.6 TABLET ORAL at 08:12

## 2024-12-31 RX ADMIN — SODIUM CHLORIDE, POTASSIUM CHLORIDE, SODIUM LACTATE AND CALCIUM CHLORIDE: 600; 310; 30; 20 INJECTION, SOLUTION INTRAVENOUS at 04:12

## 2024-12-31 RX ADMIN — ACETAMINOPHEN 975 MG: 325 TABLET ORAL at 08:12

## 2024-12-31 RX ADMIN — PANTOPRAZOLE SODIUM 40 MG: 40 TABLET, DELAYED RELEASE ORAL at 08:12

## 2024-12-31 RX ADMIN — HYDROXYUREA 1000 MG: 500 CAPSULE ORAL at 08:12

## 2024-12-31 RX ADMIN — SODIUM CHLORIDE, POTASSIUM CHLORIDE, SODIUM LACTATE AND CALCIUM CHLORIDE: 600; 310; 30; 20 INJECTION, SOLUTION INTRAVENOUS at 12:12

## 2024-12-31 RX ADMIN — METHOCARBAMOL TABLETS 750 MG: 750 TABLET, COATED ORAL at 08:12

## 2024-12-31 RX ADMIN — OXYCODONE HYDROCHLORIDE 15 MG: 10 TABLET ORAL at 02:12

## 2024-12-31 RX ADMIN — HYDROMORPHONE HYDROCHLORIDE 3 MG: 2 INJECTION INTRAMUSCULAR; INTRAVENOUS; SUBCUTANEOUS at 03:12

## 2024-12-31 RX ADMIN — DIPHENHYDRAMINE HYDROCHLORIDE 12.5 MG: 50 INJECTION, SOLUTION INTRAMUSCULAR; INTRAVENOUS at 07:12

## 2024-12-31 RX ADMIN — SODIUM CHLORIDE, POTASSIUM CHLORIDE, SODIUM LACTATE AND CALCIUM CHLORIDE: 600; 310; 30; 20 INJECTION, SOLUTION INTRAVENOUS at 07:12

## 2024-12-31 RX ADMIN — OXYCODONE HYDROCHLORIDE 15 MG: 10 TABLET ORAL at 04:12

## 2024-12-31 RX ADMIN — HYDROXYZINE HYDROCHLORIDE 25 MG: 25 TABLET ORAL at 10:12

## 2024-12-31 RX ADMIN — Medication 6 MG: at 08:12

## 2024-12-31 RX ADMIN — HYDROMORPHONE HYDROCHLORIDE 3 MG: 2 INJECTION INTRAMUSCULAR; INTRAVENOUS; SUBCUTANEOUS at 10:12

## 2024-12-31 RX ADMIN — DIPHENHYDRAMINE HYDROCHLORIDE 12.5 MG: 50 INJECTION, SOLUTION INTRAMUSCULAR; INTRAVENOUS at 05:12

## 2024-12-31 NOTE — ASSESSMENT & PLAN NOTE
Patient's blood pressure range in the last 24 hours was: BP  Min: 107/72  Max: 153/82.The patient's inpatient anti-hypertensive regimen is listed below:  Current Antihypertensives  amLODIPine tablet 10 mg, Daily, Oral    Plan  - Restart home meds and adjust accordingly if needed.

## 2024-12-31 NOTE — PLAN OF CARE
Problem: Adult Inpatient Plan of Care  Goal: Plan of Care Review  Outcome: Progressing  Flowsheets (Taken 12/30/2024 1950)  Plan of Care Reviewed With: patient  Goal: Patient-Specific Goal (Individualized)  12/30/2024 1951 by Curt Rm RN  Outcome: Progressing  Goal: Absence of Hospital-Acquired Illness or Injury  12/30/2024 1951 by Curt Rm RN  Outcome: Progressing  12/30/2024 1950 by Curt Rm RN  Outcome: Progressing  Goal: Optimal Comfort and Wellbeing  12/30/2024 1951 by Curt Rm RN  Outcome: Progressing  Outcome: Progressing  Goal: Readiness for Transition of Care    Outcome: Progressing   Pt AAOX4, pain management, VS stable, scheduled meds. Educated pt on the use of call light,instructed the pt to call for assitance if needed, call light within reach. No acute events noted during this shift. Plan of care ongoing.

## 2024-12-31 NOTE — PROGRESS NOTES
[facility] - Internal [facility] Medicine  Progress Note    Patient Name: Nazanin Malone  MRN: [id number]  Patient Class: IP- Inpatient   Admission Date: 12/29/2024  Length of Stay: 1 days  Attending Physician: Samira De La Cruz MD  Primary Care Provider: Pee Montgomery MD        Subjective     Principal Problem:Vaso-occlusive pain due to sickle cell disease        HPI:  By Dr. Nathalai Mitchell MD    Nazanin Malone is a 44 y.o. female with sickle cell beta thalassemia zero, multiple pulm emboli on chronic AC, asthma, and HTN who presents for pain in the left lower ribcage area of her chest and left breast as well as her BLE which began 1-2 days ago, feels c/w her prior pain crises, and has been worsening since despite taking her home regimen of tylenol, oxycodone 15mg, and muscle relaxers. She states that she did feel warm at one point and her temp was 100.8 but no higher temps than that. She also reports some ear pain BL but R>L. Reports she has been around someone who had the flu recently. Some nausea but no vomiting. She does have port of her right chest that was last accessed ~12/18 when she was in the hospital for a pain crisis. Denies any pain, redness, swelling, discharge of her port site. Denied cough, sore throat, shortness of breath, wheezing, abd pain, dysuria, headaches, weakness.     She reports that she moved to the area about 6 weeks ago after living in [place]. She was receiving chronic exchange transfusions there but is not currently receiving them as part of her therapy here. She is seeing a hematologist. She also reports that she previously was on MS contin, pen VK, and hydroxurea as part of her home regimen but has not been put back on those since she has moved here.     Overview/Hospital Course:  Ms. Malone was admitted to Hospital Medicine for management of a sickle cell pain crisis.  She was started on Dilaudid 3mg IV q3h prn and IVF.    Interval History: Yesterday  afternoon with chest pain.  Trop and EKG ok.  Does have history of PE, on Eliquis.  No missed doses lately.  Can consider CTA if recurrs.  Try of acid reflux meds.  Remains on IV Dilaudid around the clock.  Thinks she needs a few more days.    Review of Systems  Constitutional:  Negative for chills, fatigue and fever.   Respiratory:  Negative for cough and shortness of breath.    Cardiovascular:  Negative for chest pain, palpitations and leg swelling.   Gastrointestinal:  Negative for abdominal pain, diarrhea, nausea and vomiting.   Genitourinary:  Negative for dysuria and urgency.   Musculoskeletal:  Positive for arthralgias and myalgias.   Neurological:  Negative for dizziness and headaches.   All other systems reviewed and are negative.      Objective:     Vital Signs (Most Recent):  Temp: 98.4 °F (36.9 °C) (12/31/24 1115)  Pulse: 72 (12/31/24 1115)  Resp: 18 (12/31/24 1115)  BP: 127/80 (12/31/24 1115)  SpO2: (!) 92 % (12/31/24 1115) Vital Signs (24h Range):  Temp:  [97.9 °F (36.6 °C)-98.5 °F (36.9 °C)] 98.4 °F (36.9 °C)  Pulse:  [72-78] 72  Resp:  [16-18] 18  SpO2:  [92 %-97 %] 92 %  BP: (107-153)/(68-98) 127/80     Weight: 97.5 kg (215 lb)  Body mass index is 39.32 kg/m².    Intake/Output Summary (Last 24 hours) at 12/31/2024 1117  Last data filed at 12/31/2024 0800  Gross per 24 hour   Intake 240 ml   Output --   Net 240 ml      Physical Exam  Constitutional:       Appearance: Normal appearance. She is obese.      Comments: Appears comfortable   HENT:      Head: Normocephalic and atraumatic.   Cardiovascular:      Rate and Rhythm: Normal rate and regular rhythm.      Heart sounds: No murmur heard.  Pulmonary:      Effort: Pulmonary effort is normal. No respiratory distress.      Breath sounds: Normal breath sounds. No wheezing or rales.   Abdominal:      General: There is no distension.      Palpations: Abdomen is soft.      Tenderness: There is no abdominal tenderness.   Musculoskeletal:         General:  Tenderness (Extremities, chest , back) present. No deformity.   Skin:     General: Skin is warm and dry.   Neurological:      General: No focal deficit present.      Mental Status: She is alert and oriented to person, place, and time. Mental status is at baseline.         MELD 3.0: 12 at 8/12/2024  2:47 AM  MELD-Na: 10 at 8/12/2024  2:47 AM  Calculated from:  Serum Creatinine: 1.5 mg/dL at 8/12/2024  2:47 AM  Serum Sodium: 139 mmol/L (Using max of 137 mmol/L) at 8/12/2024  2:47 AM  Total Bilirubin: 0.3 mg/dL (Using min of 1 mg/dL) at 8/12/2024  2:47 AM  Serum Albumin: 3.1 g/dL at 8/12/2024  2:47 AM  INR(ratio): 1 at 8/10/2024 10:57 AM  Age at listing (hypothetical): 44 years  Sex: Female at 8/12/2024  2:47 AM      Significant Labs:  CBC:  Recent Labs   Lab 12/29/24  1756 12/30/24  0512   WBC 6.41 5.80   HGB 8.4* 8.2*   HCT 25.2* 25.4*    466*     CMP:  Recent Labs   Lab 12/29/24  1756 12/30/24  0512    137   K 3.7 3.7    103   CO2 26 26   GLU 78 79   BUN 10 10   CREATININE 1.0 1.1   CALCIUM 9.7 9.5   PROT 7.1 7.2   ALBUMIN 3.2* 3.2*   BILITOT 0.5 0.5   ALKPHOS 76 77   AST 18 18   ALT 12 12   ANIONGAP 8 8         Assessment and Plan     * Vaso-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 No sxs c/w acute chest or other more emergent conditions.  Pain regimen: scheduled tylenol, robaxin, and oxycodone   Continue Dilaudid 3mg IV q3h prn  mIVF calculated for ideal body weight.  Folic acid, hydroxyurea and other home SCD medications reordered.    Chronic prescription opiate use  Chronic pain meds for sickle cell      Hx pulmonary embolism  Multiple emboli noted in past.  Continue home anticoagulation.  Recent Trop and EKG negative but given left sided chest pain if it recurrs can consider repeat CTA    Chronic gastritis  Continue home  PPI.  Start Carafate given chest pain    Chronic anticoagulation  On Eliquis for history of PE      Intermittent asthma  Continue home albuterol PRN.      Hypertension  Patient's blood pressure range in the last 24 hours was: BP  Min: 107/72  Max: 153/82.The patient's inpatient anti-hypertensive regimen is listed below:  Current Antihypertensives  amLODIPine tablet 10 mg, Daily, Oral    Plan  - Restart home meds and adjust accordingly if needed.      VTE Risk Mitigation (From admission, onward)           Ordered     apixaban tablet 5 mg  2 times daily         12/29/24 2131     Reason for No Pharmacological VTE Prophylaxis  Once        Question:  Reasons:  Answer:  Already adequately anticoagulated on oral Anticoagulants    12/29/24 2046     IP VTE HIGH RISK PATIENT  Once         12/29/24 2046     Place sequential compression device  Until discontinued         12/29/24 2046                    Discharge Planning   ALYSE: 1/3/2025     Code Status: Full Code   Medical Readiness for Discharge Date:   Discharge Plan A: Home          High Risk Conditions:   Patient is currently receiving parenteral controlled substances: Neelam De La Cruz MD  Department of Hospital Medicine   [facility] - Internal Medicine Telemetry

## 2024-12-31 NOTE — ASSESSMENT & PLAN NOTE
Pt presenting w/ similar symptomology to prior events without obvious inciting cause although appears to have some gap between her prior home pain regimen and currently. No sxs c/w acute chest or other more emergent conditions.  Pain regimen: scheduled tylenol, robaxin, and oxycodone   Continue Dilaudid 3mg IV q3h prn  mIVF calculated for ideal body weight.  Folic acid, hydroxyurea and other home SCD medications reordered.

## 2024-12-31 NOTE — ASSESSMENT & PLAN NOTE
Multiple emboli noted in past.  Continue home anticoagulation.  Recent Trop and EKG negative but given left sided chest pain if it recurrs can consider repeat CTA

## 2024-12-31 NOTE — SUBJECTIVE & OBJECTIVE
Interval History: Yesterday afternoon with chest pain.  Trop and EKG ok.  Does have history of PE, on Eliquis.  No missed doses lately.  Can consider CTA if recurrs.  Try of acid reflux meds.  Remains on IV Dilaudid around the clock.  Thinks she needs a few more days.    Review of Systems  Objective:     Vital Signs (Most Recent):  Temp: 98.4 °F (36.9 °C) (12/31/24 1115)  Pulse: 72 (12/31/24 1115)  Resp: 18 (12/31/24 1115)  BP: 127/80 (12/31/24 1115)  SpO2: (!) 92 % (12/31/24 1115) Vital Signs (24h Range):  Temp:  [97.9 °F (36.6 °C)-98.5 °F (36.9 °C)] 98.4 °F (36.9 °C)  Pulse:  [72-78] 72  Resp:  [16-18] 18  SpO2:  [92 %-97 %] 92 %  BP: (107-153)/(68-98) 127/80     Weight: 97.5 kg (215 lb)  Body mass index is 39.32 kg/m².    Intake/Output Summary (Last 24 hours) at 12/31/2024 1117  Last data filed at 12/31/2024 0800  Gross per 24 hour   Intake 240 ml   Output --   Net 240 ml      Physical Exam  Constitutional:       Appearance: Normal appearance. She is obese.      Comments: Appears comfortable   HENT:      Head: Normocephalic and atraumatic.   Cardiovascular:      Rate and Rhythm: Normal rate and regular rhythm.      Heart sounds: No murmur heard.  Pulmonary:      Effort: Pulmonary effort is normal. No respiratory distress.      Breath sounds: Normal breath sounds. No wheezing or rales.   Abdominal:      General: There is no distension.      Palpations: Abdomen is soft.      Tenderness: There is no abdominal tenderness.   Musculoskeletal:         General: Tenderness (Extremities, chest , back) present. No deformity.   Skin:     General: Skin is warm and dry.   Neurological:      General: No focal deficit present.      Mental Status: She is alert and oriented to person, place, and time. Mental status is at baseline.         MELD 3.0: 12 at 8/12/2024  2:47 AM  MELD-Na: 10 at 8/12/2024  2:47 AM  Calculated from:  Serum Creatinine: 1.5 mg/dL at 8/12/2024  2:47 AM  Serum Sodium: 139 mmol/L (Using max of 137 mmol/L) at  8/12/2024  2:47 AM  Total Bilirubin: 0.3 mg/dL (Using min of 1 mg/dL) at 8/12/2024  2:47 AM  Serum Albumin: 3.1 g/dL at 8/12/2024  2:47 AM  INR(ratio): 1 at 8/10/2024 10:57 AM  Age at listing (hypothetical): 44 years  Sex: Female at 8/12/2024  2:47 AM      Significant Labs:  CBC:  Recent Labs   Lab 12/29/24  1756 12/30/24  0512   WBC 6.41 5.80   HGB 8.4* 8.2*   HCT 25.2* 25.4*    466*     CMP:  Recent Labs   Lab 12/29/24  1756 12/30/24  0512    137   K 3.7 3.7    103   CO2 26 26   GLU 78 79   BUN 10 10   CREATININE 1.0 1.1   CALCIUM 9.7 9.5   PROT 7.1 7.2   ALBUMIN 3.2* 3.2*   BILITOT 0.5 0.5   ALKPHOS 76 77   AST 18 18   ALT 12 12   ANIONGAP 8 8

## 2025-01-01 PROCEDURE — 63600175 PHARM REV CODE 636 W HCPCS: Performed by: HOSPITALIST

## 2025-01-01 PROCEDURE — 11000001 HC ACUTE MED/SURG PRIVATE ROOM

## 2025-01-01 PROCEDURE — 25000003 PHARM REV CODE 250: Performed by: STUDENT IN AN ORGANIZED HEALTH CARE EDUCATION/TRAINING PROGRAM

## 2025-01-01 PROCEDURE — 63600175 PHARM REV CODE 636 W HCPCS: Performed by: STUDENT IN AN ORGANIZED HEALTH CARE EDUCATION/TRAINING PROGRAM

## 2025-01-01 PROCEDURE — 25000003 PHARM REV CODE 250: Performed by: HOSPITALIST

## 2025-01-01 RX ORDER — HYDROXYZINE HYDROCHLORIDE 25 MG/1
50 TABLET, FILM COATED ORAL EVERY 4 HOURS PRN
Status: DISCONTINUED | OUTPATIENT
Start: 2025-01-01 | End: 2025-01-05 | Stop reason: HOSPADM

## 2025-01-01 RX ORDER — HYDROXYZINE HYDROCHLORIDE 25 MG/1
25 TABLET, FILM COATED ORAL EVERY 4 HOURS PRN
Status: DISCONTINUED | OUTPATIENT
Start: 2025-01-01 | End: 2025-01-01

## 2025-01-01 RX ORDER — DIPHENHYDRAMINE HCL 25 MG
50 CAPSULE ORAL EVERY 6 HOURS PRN
Status: DISCONTINUED | OUTPATIENT
Start: 2025-01-01 | End: 2025-01-05 | Stop reason: HOSPADM

## 2025-01-01 RX ADMIN — ACETAMINOPHEN 975 MG: 325 TABLET ORAL at 08:01

## 2025-01-01 RX ADMIN — ACETAMINOPHEN 975 MG: 325 TABLET ORAL at 04:01

## 2025-01-01 RX ADMIN — HYDROXYZINE HYDROCHLORIDE 50 MG: 25 TABLET ORAL at 04:01

## 2025-01-01 RX ADMIN — SUCRALFATE 1 G: 1 TABLET ORAL at 04:01

## 2025-01-01 RX ADMIN — DIPHENHYDRAMINE HYDROCHLORIDE 50 MG: 25 CAPSULE ORAL at 02:01

## 2025-01-01 RX ADMIN — DIPHENHYDRAMINE HYDROCHLORIDE 12.5 MG: 50 INJECTION, SOLUTION INTRAMUSCULAR; INTRAVENOUS at 01:01

## 2025-01-01 RX ADMIN — HYDROMORPHONE HYDROCHLORIDE 3 MG: 2 INJECTION INTRAMUSCULAR; INTRAVENOUS; SUBCUTANEOUS at 08:01

## 2025-01-01 RX ADMIN — HYDROXYZINE HYDROCHLORIDE 50 MG: 25 TABLET ORAL at 08:01

## 2025-01-01 RX ADMIN — SUCRALFATE 1 G: 1 TABLET ORAL at 11:01

## 2025-01-01 RX ADMIN — DIPHENHYDRAMINE HYDROCHLORIDE 12.5 MG: 50 INJECTION, SOLUTION INTRAMUSCULAR; INTRAVENOUS at 08:01

## 2025-01-01 RX ADMIN — OXYCODONE HYDROCHLORIDE 15 MG: 10 TABLET ORAL at 04:01

## 2025-01-01 RX ADMIN — HYDROXYUREA 1000 MG: 500 CAPSULE ORAL at 08:01

## 2025-01-01 RX ADMIN — HYDROMORPHONE HYDROCHLORIDE 3 MG: 2 INJECTION INTRAMUSCULAR; INTRAVENOUS; SUBCUTANEOUS at 05:01

## 2025-01-01 RX ADMIN — SENNOSIDES AND DOCUSATE SODIUM 1 TABLET: 50; 8.6 TABLET ORAL at 08:01

## 2025-01-01 RX ADMIN — METHOCARBAMOL TABLETS 750 MG: 750 TABLET, COATED ORAL at 08:01

## 2025-01-01 RX ADMIN — PANTOPRAZOLE SODIUM 40 MG: 40 TABLET, DELAYED RELEASE ORAL at 08:01

## 2025-01-01 RX ADMIN — FOLIC ACID 1 MG: 1 TABLET ORAL at 11:01

## 2025-01-01 RX ADMIN — HYDROXYZINE HYDROCHLORIDE 50 MG: 25 TABLET ORAL at 11:01

## 2025-01-01 RX ADMIN — APIXABAN 5 MG: 5 TABLET, FILM COATED ORAL at 08:01

## 2025-01-01 RX ADMIN — METHOCARBAMOL TABLETS 750 MG: 750 TABLET, COATED ORAL at 04:01

## 2025-01-01 RX ADMIN — SUCRALFATE 1 G: 1 TABLET ORAL at 08:01

## 2025-01-01 RX ADMIN — SODIUM CHLORIDE, POTASSIUM CHLORIDE, SODIUM LACTATE AND CALCIUM CHLORIDE: 600; 310; 30; 20 INJECTION, SOLUTION INTRAVENOUS at 01:01

## 2025-01-01 RX ADMIN — AMLODIPINE BESYLATE 10 MG: 10 TABLET ORAL at 08:01

## 2025-01-01 RX ADMIN — DIPHENHYDRAMINE HYDROCHLORIDE 50 MG: 25 CAPSULE ORAL at 08:01

## 2025-01-01 RX ADMIN — SUCRALFATE 1 G: 1 TABLET ORAL at 05:01

## 2025-01-01 RX ADMIN — HYDROMORPHONE HYDROCHLORIDE 3 MG: 2 INJECTION INTRAMUSCULAR; INTRAVENOUS; SUBCUTANEOUS at 01:01

## 2025-01-01 RX ADMIN — HYDROMORPHONE HYDROCHLORIDE 3 MG: 2 INJECTION INTRAMUSCULAR; INTRAVENOUS; SUBCUTANEOUS at 04:01

## 2025-01-01 RX ADMIN — HYDROMORPHONE HYDROCHLORIDE 3 MG: 2 INJECTION INTRAMUSCULAR; INTRAVENOUS; SUBCUTANEOUS at 02:01

## 2025-01-01 RX ADMIN — HYDROMORPHONE HYDROCHLORIDE 3 MG: 2 INJECTION INTRAMUSCULAR; INTRAVENOUS; SUBCUTANEOUS at 11:01

## 2025-01-01 NOTE — SUBJECTIVE & OBJECTIVE
Interval History: No acute events overnight.  Requested Atarax for itching.  This morning, informed by nursing patient asked if she could be given a little more than what's prescribed (not the full dosage) of her medications, no one has to know and asked if nursing needed some help to throw the items that were in their hand (the empty flush syringe and the empty med syringe).  She feels like her pain is 7/10. Hopeful to DC tomorrow or Friday.    Review of Systems   Constitutional:  Negative for chills, fatigue and fever.   Respiratory:  Negative for cough and shortness of breath.    Cardiovascular:  Negative for chest pain, palpitations and leg swelling.   Gastrointestinal:  Negative for abdominal pain, diarrhea, nausea and vomiting.   Genitourinary:  Negative for dysuria and urgency.   Musculoskeletal:  Positive for arthralgias and myalgias.   Neurological:  Negative for dizziness and headaches.   All other systems reviewed and are negative.    Objective:     Vital Signs (Most Recent):  Temp: 98.7 °F (37.1 °C) (01/01/25 0746)  Pulse: 73 (01/01/25 0746)  Resp: 18 (01/01/25 0811)  BP: 127/76 (01/01/25 0746)  SpO2: 98 % (01/01/25 0746) Vital Signs (24h Range):  Temp:  [98.4 °F (36.9 °C)-98.9 °F (37.2 °C)] 98.7 °F (37.1 °C)  Pulse:  [70-79] 73  Resp:  [16-18] 18  SpO2:  [92 %-98 %] 98 %  BP: (127-144)/(76-88) 127/76     Weight: 97.5 kg (215 lb)  Body mass index is 39.32 kg/m².    Intake/Output Summary (Last 24 hours) at 1/1/2025 1057  Last data filed at 12/31/2024 1759  Gross per 24 hour   Intake 480 ml   Output --   Net 480 ml      Physical Exam  Constitutional:       Appearance: Normal appearance. She is obese.      Comments: Appears comfortable   HENT:      Head: Normocephalic and atraumatic.   Cardiovascular:      Rate and Rhythm: Normal rate and regular rhythm.      Heart sounds: No murmur heard.  Pulmonary:      Effort: Pulmonary effort is normal. No respiratory distress.      Breath sounds: Normal breath sounds.  "No wheezing or rales.   Abdominal:      General: There is no distension.      Palpations: Abdomen is soft.      Tenderness: There is no abdominal tenderness.   Musculoskeletal:         General: Tenderness (Extremities, chest , back) present. No deformity.   Skin:     General: Skin is warm and dry.   Neurological:      General: No focal deficit present.      Mental Status: She is alert and oriented to person, place, and time. Mental status is at baseline.         MELD 3.0: 12 at 8/12/2024  2:47 AM  MELD-Na: 10 at 8/12/2024  2:47 AM  Calculated from:  Serum Creatinine: 1.5 mg/dL at 8/12/2024  2:47 AM  Serum Sodium: 139 mmol/L (Using max of 137 mmol/L) at 8/12/2024  2:47 AM  Total Bilirubin: 0.3 mg/dL (Using min of 1 mg/dL) at 8/12/2024  2:47 AM  Serum Albumin: 3.1 g/dL at 8/12/2024  2:47 AM  INR(ratio): 1 at 8/10/2024 10:57 AM  Age at listing (hypothetical): 44 years  Sex: Female at 8/12/2024  2:47 AM      Significant Labs:  CBC:  No results for input(s): "WBC", "HGB", "HCT", "PLT" in the last 48 hours.    CMP:  No results for input(s): "NA", "K", "CL", "CO2", "GLU", "BUN", "CREATININE", "CALCIUM", "PROT", "ALBUMIN", "BILITOT", "ALKPHOS", "AST", "ALT", "ANIONGAP", "EGFRNONAA" in the last 48 hours.    Invalid input(s): "[place]"      "

## 2025-01-01 NOTE — ASSESSMENT & PLAN NOTE
Patient's blood pressure range in the last 24 hours was: BP  Min: 127/76  Max: 161/95.The patient's inpatient anti-hypertensive regimen is listed below:  Current Antihypertensives  amLODIPine tablet 10 mg, Daily, Oral    Plan  - Restart home meds and adjust accordingly if needed.

## 2025-01-01 NOTE — NURSING
After giving Dilaudid at 0811, pt asked when can she take the next benadryl. Nurse responded stating an hour after receiving the dilaudid. Pt aksed if she could get it earlier. Nurse asked MD and agreed it was okay to give in 30 min for this one time. While administering benadryl 12.5mg, pt asked if nurse could give her a little more than what's prescribed no one has to know. Pt also asked if the nurse needed to some help to throw the items that were in my hand (the empty flush syringe and the empty med syringe). MD notified.

## 2025-01-01 NOTE — ASSESSMENT & PLAN NOTE
While admitted, informed by nursing patient asked if she could be given a little more than what's prescribed (not the full dosage) of her medications, no one has to know and asked if nursing needed some help to throw the items that were in their hand (the empty flush syringe and the empty med syringe).

## 2025-01-01 NOTE — PROGRESS NOTES
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 Nathalia Mitchell MD    Nazanin Malone is a 44 y.o. female with sickle cell beta thalassemia zero, multiple pulm emboli on chronic AC, asthma, and HTN who presents for pain in the left lower ribcage area of her chest and left breast as well as her BLE which began 1-2 days ago, feels c/w her prior pain crises, and has been worsening since despite taking her home regimen of tylenol, oxycodone 15mg, and muscle relaxers. She states that she did feel warm at one point and her temp was 100.8 but no higher temps than that. She also reports some ear pain BL but R>L. Reports she has been around someone who had the flu recently. Some nausea but no vomiting. She does have port of her right chest that was last accessed ~12/18 when she was in the hospital for a pain crisis. Denies any pain, redness, swelling, discharge of her port site. Denied cough, sore throat, shortness of breath, wheezing, abd pain, dysuria, headaches, weakness.     She reports that she moved to the area about 6 weeks ago after living in [place]. She was receiving chronic exchange transfusions there but is not currently receiving them as part of her therapy here. She is seeing a hematologist. She also reports that she previously was on MS contin, pen VK, and hydroxurea as part of her home regimen but has not been put back on those since she has moved here.     Overview/Hospital Course:  Ms. Malone was admitted to Hospital Medicine for management of a sickle cell pain crisis.  She was started on Dilaudid 3mg IV q3h prn and IVF.  While admitted, informed by  nursing patient asked if she could be given a little more than what's prescribed (not the full dosage) of her medications, no one has to know and asked if nursing needed some help to throw the items that were in their hand (the empty flush syringe and the empty med syringe).    Interval History: No acute events overnight.  Requested Atarax for itching.  This morning, informed by nursing patient asked if she could be given a little more than what's prescribed (not the full dosage) of her medications, no one has to know and asked if nursing needed some help to throw the items that were in their hand (the empty flush syringe and the empty med syringe).  She feels like her pain is 7/10. Hopeful to DC tomorrow or Friday.    Review of Systems   Constitutional:  Negative for chills, fatigue and fever.   Respiratory:  Negative for cough and shortness of breath.    Cardiovascular:  Negative for chest pain, palpitations and leg swelling.   Gastrointestinal:  Negative for abdominal pain, diarrhea, nausea and vomiting.   Genitourinary:  Negative for dysuria and urgency.   Musculoskeletal:  Positive for arthralgias and myalgias.   Neurological:  Negative for dizziness and headaches.   All other systems reviewed and are negative.    Objective:     Vital Signs (Most Recent):  Temp: 98.7 °F (37.1 °C) (01/01/25 0746)  Pulse: 73 (01/01/25 0746)  Resp: 18 (01/01/25 0811)  BP: 127/76 (01/01/25 0746)  SpO2: 98 % (01/01/25 0746) Vital Signs (24h Range):  Temp:  [98.4 °F (36.9 °C)-98.9 °F (37.2 °C)] 98.7 °F (37.1 °C)  Pulse:  [70-79] 73  Resp:  [16-18] 18  SpO2:  [92 %-98 %] 98 %  BP: (127-144)/(76-88) 127/76     Weight: 97.5 kg (215 lb)  Body mass index is 39.32 kg/m².    Intake/Output Summary (Last 24 hours) at 1/1/2025 1057  Last data filed at 12/31/2024 1759  Gross per 24 hour   Intake 480 ml   Output --   Net 480 ml      Physical Exam  Constitutional:       Appearance: Normal appearance. She is obese.      Comments: Appears  "comfortable   HENT:      Head: Normocephalic and atraumatic.   Cardiovascular:      Rate and Rhythm: Normal rate and regular rhythm.      Heart sounds: No murmur heard.  Pulmonary:      Effort: Pulmonary effort is normal. No respiratory distress.      Breath sounds: Normal breath sounds. No wheezing or rales.   Abdominal:      General: There is no distension.      Palpations: Abdomen is soft.      Tenderness: There is no abdominal tenderness.   Musculoskeletal:         General: Tenderness (Extremities, chest , back) present. No deformity.   Skin:     General: Skin is warm and dry.   Neurological:      General: No focal deficit present.      Mental Status: She is alert and oriented to person, place, and time. Mental status is at baseline.         MELD 3.0: 12 at 8/12/2024  2:47 AM  MELD-Na: 10 at 8/12/2024  2:47 AM  Calculated from:  Serum Creatinine: 1.5 mg/dL at 8/12/2024  2:47 AM  Serum Sodium: 139 mmol/L (Using max of 137 mmol/L) at 8/12/2024  2:47 AM  Total Bilirubin: 0.3 mg/dL (Using min of 1 mg/dL) at 8/12/2024  2:47 AM  Serum Albumin: 3.1 g/dL at 8/12/2024  2:47 AM  INR(ratio): 1 at 8/10/2024 10:57 AM  Age at listing (hypothetical): 44 years  Sex: Female at 8/12/2024  2:47 AM      Significant Labs:  CBC:  No results for input(s): "WBC", "HGB", "HCT", "PLT" in the last 48 hours.    CMP:  No results for input(s): "NA", "K", "CL", "CO2", "GLU", "BUN", "CREATININE", "CALCIUM", "PROT", "ALBUMIN", "BILITOT", "ALKPHOS", "AST", "ALT", "ANIONGAP", "EGFRNONAA" in the last 48 hours.    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 No sxs c/w acute chest or other more emergent conditions.  Pain regimen: scheduled tylenol, robaxin, and oxycodone   Continue Dilaudid 3mg IV q3h prn  mIVF " calculated for ideal body weight.  Folic acid, hydroxyurea and other home SCD medications reordered.    Chronic prescription opiate use  Chronic pain meds for sickle cell      Hx pulmonary embolism  Multiple emboli noted in past.  Continue home anticoagulation.  Recent Trop and EKG negative but given left sided chest pain if it recurrs can consider repeat CTA    Drug-seeking behavior  While admitted, informed by nursing patient asked if she could be given a little more than what's prescribed (not the full dosage) of her medications, no one has to know and asked if nursing needed some help to throw the items that were in their hand (the empty flush syringe and the empty med syringe).      Chronic gastritis  Continue home PPI.  Start Carafate given chest pain    Chronic anticoagulation  On Eliquis for history of PE      Intermittent asthma  Continue home albuterol PRN.      Hypertension  Patient's blood pressure range in the last 24 hours was: BP  Min: 127/76  Max: 161/95.The patient's inpatient anti-hypertensive regimen is listed below:  Current Antihypertensives  amLODIPine tablet 10 mg, Daily, Oral    Plan  - Restart home meds and adjust accordingly if needed.      VTE Risk Mitigation (From admission, onward)           Ordered     apixaban tablet 5 mg  2 times daily         12/29/24 2131     Reason for No Pharmacological VTE Prophylaxis  Once        Question:  Reasons:  Answer:  Already adequately anticoagulated on oral Anticoagulants    12/29/24 2046     IP VTE HIGH RISK PATIENT  Once         12/29/24 2046     Place sequential compression device  Until discontinued         12/29/24 2046                    Discharge Planning   ALYSE: 1/3/2025     Code Status: Full Code   Medical Readiness for Discharge Date:   Discharge Plan A: Home          High Risk Conditions:   Patient is currently receiving parenteral controlled substances: Neelam De La Cruz MD  Department of Hospital Medicine   [facility] -  Internal Medicine Telemetry

## 2025-01-01 NOTE — PLAN OF CARE
Problem: Adult Inpatient Plan of Care  Goal: Plan of Care Review  Outcome: Progressing  Flowsheets (Taken 12/31/2024 1913)  Plan of Care Reviewed With: patient  Goal: Patient-Specific Goal (Individualized)  Outcome: Progressing  Goal: Absence of Hospital-Acquired Illness or Injury  Outcome: Progressing  Goal: Optimal Comfort and Wellbeing  Outcome: Progressing  Goal: Readiness for Transition of Care  Outcome: Progressing   Pt AAOX4, pain management, VS stable, scheduled meds. Educated pt on the use of call light,instructed the pt to call for assitance if needed, call light within reach. No acute events noted during this shift. Plan of care ongoing.

## 2025-01-02 LAB
ALBUMIN SERPL BCP-MCNC: 3.2 G/DL (ref 3.5–5.2)
ALP SERPL-CCNC: 70 U/L (ref 40–150)
ALT SERPL W/O P-5'-P-CCNC: 8 U/L (ref 10–44)
ANION GAP SERPL CALC-SCNC: 7 MMOL/L (ref 8–16)
AST SERPL-CCNC: 15 U/L (ref 10–40)
BASOPHILS # BLD AUTO: 0.05 K/UL (ref 0–0.2)
BASOPHILS NFR BLD: 1.2 % (ref 0–1.9)
BILIRUB SERPL-MCNC: 0.3 MG/DL (ref 0.1–1)
BUN SERPL-MCNC: 12 MG/DL (ref 6–20)
CALCIUM SERPL-MCNC: 9.1 MG/DL (ref 8.7–10.5)
CHLORIDE SERPL-SCNC: 106 MMOL/L (ref 95–110)
CO2 SERPL-SCNC: 28 MMOL/L (ref 23–29)
CREAT SERPL-MCNC: 1 MG/DL (ref 0.5–1.4)
DIFFERENTIAL METHOD BLD: ABNORMAL
EOSINOPHIL # BLD AUTO: 0.3 K/UL (ref 0–0.5)
EOSINOPHIL NFR BLD: 7.9 % (ref 0–8)
ERYTHROCYTE [DISTWIDTH] IN BLOOD BY AUTOMATED COUNT: 21.2 % (ref 11.5–14.5)
EST. GFR  (NO RACE VARIABLE): >60 ML/MIN/1.73 M^2
GLUCOSE SERPL-MCNC: 101 MG/DL (ref 70–110)
HCT VFR BLD AUTO: 26.7 % (ref 37–48.5)
HGB BLD-MCNC: 8.6 G/DL (ref 12–16)
IMM GRANULOCYTES # BLD AUTO: 0 K/UL (ref 0–0.04)
IMM GRANULOCYTES NFR BLD AUTO: 0 % (ref 0–0.5)
LYMPHOCYTES # BLD AUTO: 2.1 K/UL (ref 1–4.8)
LYMPHOCYTES NFR BLD: 51.6 % (ref 18–48)
MAGNESIUM SERPL-MCNC: 1.6 MG/DL (ref 1.6–2.6)
MCH RBC QN AUTO: 20.8 PG (ref 27–31)
MCHC RBC AUTO-ENTMCNC: 32.2 G/DL (ref 32–36)
MCV RBC AUTO: 65 FL (ref 82–98)
MONOCYTES # BLD AUTO: 0.6 K/UL (ref 0.3–1)
MONOCYTES NFR BLD: 13.5 % (ref 4–15)
NEUTROPHILS # BLD AUTO: 1.1 K/UL (ref 1.8–7.7)
NEUTROPHILS NFR BLD: 25.8 % (ref 38–73)
NRBC BLD-RTO: 2 /100 WBC
PHOSPHATE SERPL-MCNC: 3 MG/DL (ref 2.7–4.5)
PLATELET # BLD AUTO: 483 K/UL (ref 150–450)
PMV BLD AUTO: 9.2 FL (ref 9.2–12.9)
POTASSIUM SERPL-SCNC: 3.4 MMOL/L (ref 3.5–5.1)
PROT SERPL-MCNC: 7 G/DL (ref 6–8.4)
RBC # BLD AUTO: 4.13 M/UL (ref 4–5.4)
RETICS/RBC NFR AUTO: 1.1 % (ref 0.5–2.5)
SODIUM SERPL-SCNC: 141 MMOL/L (ref 136–145)
WBC # BLD AUTO: 4.07 K/UL (ref 3.9–12.7)

## 2025-01-02 PROCEDURE — 63600175 PHARM REV CODE 636 W HCPCS: Performed by: HOSPITALIST

## 2025-01-02 PROCEDURE — 63600175 PHARM REV CODE 636 W HCPCS: Performed by: STUDENT IN AN ORGANIZED HEALTH CARE EDUCATION/TRAINING PROGRAM

## 2025-01-02 PROCEDURE — 11000001 HC ACUTE MED/SURG PRIVATE ROOM

## 2025-01-02 PROCEDURE — 84100 ASSAY OF PHOSPHORUS: CPT | Performed by: HOSPITALIST

## 2025-01-02 PROCEDURE — 83735 ASSAY OF MAGNESIUM: CPT | Performed by: HOSPITALIST

## 2025-01-02 PROCEDURE — 85045 AUTOMATED RETICULOCYTE COUNT: CPT | Performed by: HOSPITALIST

## 2025-01-02 PROCEDURE — 80053 COMPREHEN METABOLIC PANEL: CPT | Performed by: HOSPITALIST

## 2025-01-02 PROCEDURE — 36415 COLL VENOUS BLD VENIPUNCTURE: CPT | Performed by: HOSPITALIST

## 2025-01-02 PROCEDURE — 85025 COMPLETE CBC W/AUTO DIFF WBC: CPT | Performed by: HOSPITALIST

## 2025-01-02 PROCEDURE — 25000003 PHARM REV CODE 250: Performed by: HOSPITALIST

## 2025-01-02 PROCEDURE — 25000003 PHARM REV CODE 250: Performed by: STUDENT IN AN ORGANIZED HEALTH CARE EDUCATION/TRAINING PROGRAM

## 2025-01-02 RX ORDER — POTASSIUM CHLORIDE 20 MEQ/1
40 TABLET, EXTENDED RELEASE ORAL EVERY 4 HOURS
Status: COMPLETED | OUTPATIENT
Start: 2025-01-02 | End: 2025-01-02

## 2025-01-02 RX ADMIN — HYDROXYUREA 1000 MG: 500 CAPSULE ORAL at 09:01

## 2025-01-02 RX ADMIN — METHOCARBAMOL TABLETS 750 MG: 750 TABLET, COATED ORAL at 03:01

## 2025-01-02 RX ADMIN — SUCRALFATE 1 G: 1 TABLET ORAL at 04:01

## 2025-01-02 RX ADMIN — ACETAMINOPHEN 975 MG: 325 TABLET ORAL at 03:01

## 2025-01-02 RX ADMIN — POTASSIUM CHLORIDE 40 MEQ: 1500 TABLET, EXTENDED RELEASE ORAL at 03:01

## 2025-01-02 RX ADMIN — HYDROMORPHONE HYDROCHLORIDE 3 MG: 2 INJECTION INTRAMUSCULAR; INTRAVENOUS; SUBCUTANEOUS at 09:01

## 2025-01-02 RX ADMIN — SENNOSIDES AND DOCUSATE SODIUM 1 TABLET: 50; 8.6 TABLET ORAL at 09:01

## 2025-01-02 RX ADMIN — HYDROMORPHONE HYDROCHLORIDE 3 MG: 2 INJECTION INTRAMUSCULAR; INTRAVENOUS; SUBCUTANEOUS at 05:01

## 2025-01-02 RX ADMIN — METHOCARBAMOL TABLETS 750 MG: 750 TABLET, COATED ORAL at 09:01

## 2025-01-02 RX ADMIN — HYDROMORPHONE HYDROCHLORIDE 3 MG: 2 INJECTION INTRAMUSCULAR; INTRAVENOUS; SUBCUTANEOUS at 12:01

## 2025-01-02 RX ADMIN — PANTOPRAZOLE SODIUM 40 MG: 40 TABLET, DELAYED RELEASE ORAL at 09:01

## 2025-01-02 RX ADMIN — AMLODIPINE BESYLATE 10 MG: 10 TABLET ORAL at 09:01

## 2025-01-02 RX ADMIN — APIXABAN 5 MG: 5 TABLET, FILM COATED ORAL at 09:01

## 2025-01-02 RX ADMIN — HYDROMORPHONE HYDROCHLORIDE 3 MG: 2 INJECTION INTRAMUSCULAR; INTRAVENOUS; SUBCUTANEOUS at 03:01

## 2025-01-02 RX ADMIN — SUCRALFATE 1 G: 1 TABLET ORAL at 05:01

## 2025-01-02 RX ADMIN — ACETAMINOPHEN 975 MG: 325 TABLET ORAL at 09:01

## 2025-01-02 RX ADMIN — HYDROMORPHONE HYDROCHLORIDE 3 MG: 2 INJECTION INTRAMUSCULAR; INTRAVENOUS; SUBCUTANEOUS at 06:01

## 2025-01-02 RX ADMIN — POTASSIUM CHLORIDE 40 MEQ: 1500 TABLET, EXTENDED RELEASE ORAL at 06:01

## 2025-01-02 RX ADMIN — SUCRALFATE 1 G: 1 TABLET ORAL at 12:01

## 2025-01-02 RX ADMIN — FOLIC ACID 1 MG: 1 TABLET ORAL at 09:01

## 2025-01-02 RX ADMIN — DIPHENHYDRAMINE HYDROCHLORIDE 50 MG: 25 CAPSULE ORAL at 03:01

## 2025-01-02 RX ADMIN — SUCRALFATE 1 G: 1 TABLET ORAL at 09:01

## 2025-01-02 RX ADMIN — OXYCODONE HYDROCHLORIDE 15 MG: 10 TABLET ORAL at 04:01

## 2025-01-02 RX ADMIN — SODIUM CHLORIDE, POTASSIUM CHLORIDE, SODIUM LACTATE AND CALCIUM CHLORIDE: 600; 310; 30; 20 INJECTION, SOLUTION INTRAVENOUS at 03:01

## 2025-01-02 NOTE — ASSESSMENT & PLAN NOTE
Patient's blood pressure range in the last 24 hours was: BP  Min: 119/73  Max: 161/95.The patient's inpatient anti-hypertensive regimen is listed below:  Current Antihypertensives  amLODIPine tablet 10 mg, Daily, Oral    Plan  - Restart home meds and adjust accordingly if needed.

## 2025-01-02 NOTE — SUBJECTIVE & OBJECTIVE
Interval History: No acute events overnight. Endorses pain in her right leg today that is 8/10.  Does not feel ready to go home today, maybe tomorrow.  Labs pending.    Review of Systems   Constitutional:  Negative for chills, fatigue and fever.   Respiratory:  Negative for cough and shortness of breath.    Cardiovascular:  Negative for chest pain, palpitations and leg swelling.   Gastrointestinal:  Negative for abdominal pain, diarrhea, nausea and vomiting.   Genitourinary:  Negative for dysuria and urgency.   Musculoskeletal:  Positive for arthralgias and myalgias.   Neurological:  Negative for dizziness and headaches.   All other systems reviewed and are negative.    Objective:     Vital Signs (Most Recent):  Temp: 99.3 °F (37.4 °C) (01/02/25 0801)  Pulse: 77 (01/02/25 0801)  Resp: 18 (01/02/25 0801)  BP: 136/83 (01/02/25 0801)  SpO2: 100 % (01/02/25 0801) Vital Signs (24h Range):  Temp:  [98.6 °F (37 °C)-99.3 °F (37.4 °C)] 99.3 °F (37.4 °C)  Pulse:  [69-77] 77  Resp:  [16-18] 18  SpO2:  [94 %-100 %] 100 %  BP: (119-161)/(73-95) 136/83     Weight: 97.5 kg (215 lb)  Body mass index is 39.32 kg/m².  No intake or output data in the 24 hours ending 01/02/25 0935     Physical Exam  Constitutional:       Appearance: Normal appearance. She is obese.      Comments: Appears comfortable   HENT:      Head: Normocephalic and atraumatic.   Cardiovascular:      Rate and Rhythm: Normal rate and regular rhythm.      Heart sounds: No murmur heard.  Pulmonary:      Effort: Pulmonary effort is normal. No respiratory distress.      Breath sounds: Normal breath sounds. No wheezing or rales.   Abdominal:      General: There is no distension.      Palpations: Abdomen is soft.      Tenderness: There is no abdominal tenderness.   Musculoskeletal:         General: Tenderness (Extremities, chest , back) present. No deformity.   Skin:     General: Skin is warm and dry.   Neurological:      General: No focal deficit present.      Mental  "Status: She is alert and oriented to person, place, and time. Mental status is at baseline.         MELD 3.0: 12 at 8/12/2024  2:47 AM  MELD-Na: 10 at 8/12/2024  2:47 AM  Calculated from:  Serum Creatinine: 1.5 mg/dL at 8/12/2024  2:47 AM  Serum Sodium: 139 mmol/L (Using max of 137 mmol/L) at 8/12/2024  2:47 AM  Total Bilirubin: 0.3 mg/dL (Using min of 1 mg/dL) at 8/12/2024  2:47 AM  Serum Albumin: 3.1 g/dL at 8/12/2024  2:47 AM  INR(ratio): 1 at 8/10/2024 10:57 AM  Age at listing (hypothetical): 44 years  Sex: Female at 8/12/2024  2:47 AM      Significant Labs:  CBC:  No results for input(s): "WBC", "HGB", "HCT", "PLT" in the last 48 hours.    CMP:  No results for input(s): "NA", "K", "CL", "CO2", "GLU", "BUN", "CREATININE", "CALCIUM", "PROT", "ALBUMIN", "BILITOT", "ALKPHOS", "AST", "ALT", "ANIONGAP", "EGFRNONAA" in the last 48 hours.    Invalid input(s): "ESTGF[place]"      "

## 2025-01-02 NOTE — PROGRESS NOTES
[facility] - Internal Medicine [facility] Medicine  Progress Note    Patient Name: Nazanin Malone  MRN: [id number]  Patient Class: IP- Inpatient   Admission Date: 12/29/2024  Length of Stay: 3 days  Attending Physician: Samira De La Cruz MD  Primary Care Provider: Pee Montgomery MD        Subjective     Principal Problem:Vaso-occlusive pain due to sickle cell disease        HPI:  By Dr. Nathalia Mitchell MD    Nazanin Malone is a 44 y.o. female with sickle cell beta thalassemia zero, multiple pulm emboli on chronic AC, asthma, and HTN who presents for pain in the left lower ribcage area of her chest and left breast as well as her BLE which began 1-2 days ago, feels c/w her prior pain crises, and has been worsening since despite taking her home regimen of tylenol, oxycodone 15mg, and muscle relaxers. She states that she did feel warm at one point and her temp was 100.8 but no higher temps than that. She also reports some ear pain BL but R>L. Reports she has been around someone who had the flu recently. Some nausea but no vomiting. She does have port of her right chest that was last accessed ~12/18 when she was in the hospital for a pain crisis. Denies any pain, redness, swelling, discharge of her port site. Denied cough, sore throat, shortness of breath, wheezing, abd pain, dysuria, headaches, weakness.     She reports that she moved to the area about 6 weeks ago after living in [place]. She was receiving chronic exchange transfusions there but is not currently receiving them as part of her therapy here. She is seeing a hematologist. She also reports that she previously was on MS contin, pen VK, and hydroxurea as part of her home regimen but has not been put back on those since she has moved here.     Overview/Hospital Course:  Ms. Malone was admitted to Hospital Medicine for management of a sickle cell pain crisis.  She was started on Dilaudid 3mg IV q3h prn and IVF.  While admitted, informed by  nursing patient asked if she could be given a little more than what's prescribed (not the full dosage) of her medications, no one has to know and asked if nursing needed some help to throw the items that were in their hand (the empty flush syringe and the empty med syringe).    Interval History: No acute events overnight. Endorses pain in her right leg today that is 8/10.  Does not feel ready to go home today, maybe tomorrow.  Labs pending.    Review of Systems   Constitutional:  Negative for chills, fatigue and fever.   Respiratory:  Negative for cough and shortness of breath.    Cardiovascular:  Negative for chest pain, palpitations and leg swelling.   Gastrointestinal:  Negative for abdominal pain, diarrhea, nausea and vomiting.   Genitourinary:  Negative for dysuria and urgency.   Musculoskeletal:  Positive for arthralgias and myalgias.   Neurological:  Negative for dizziness and headaches.   All other systems reviewed and are negative.    Objective:     Vital Signs (Most Recent):  Temp: 99.3 °F (37.4 °C) (01/02/25 0801)  Pulse: 77 (01/02/25 0801)  Resp: 18 (01/02/25 0801)  BP: 136/83 (01/02/25 0801)  SpO2: 100 % (01/02/25 0801) Vital Signs (24h Range):  Temp:  [98.6 °F (37 °C)-99.3 °F (37.4 °C)] 99.3 °F (37.4 °C)  Pulse:  [69-77] 77  Resp:  [16-18] 18  SpO2:  [94 %-100 %] 100 %  BP: (119-161)/(73-95) 136/83     Weight: 97.5 kg (215 lb)  Body mass index is 39.32 kg/m².  No intake or output data in the 24 hours ending 01/02/25 0935     Physical Exam  Constitutional:       Appearance: Normal appearance. She is obese.      Comments: Appears comfortable   HENT:      Head: Normocephalic and atraumatic.   Cardiovascular:      Rate and Rhythm: Normal rate and regular rhythm.      Heart sounds: No murmur heard.  Pulmonary:      Effort: Pulmonary effort is normal. No respiratory distress.      Breath sounds: Normal breath sounds. No wheezing or rales.   Abdominal:      General: There is no distension.      Palpations:  "Abdomen is soft.      Tenderness: There is no abdominal tenderness.   Musculoskeletal:         General: Tenderness (Extremities, chest , back) present. No deformity.   Skin:     General: Skin is warm and dry.   Neurological:      General: No focal deficit present.      Mental Status: She is alert and oriented to person, place, and time. Mental status is at baseline.         MELD 3.0: 12 at 8/12/2024  2:47 AM  MELD-Na: 10 at 8/12/2024  2:47 AM  Calculated from:  Serum Creatinine: 1.5 mg/dL at 8/12/2024  2:47 AM  Serum Sodium: 139 mmol/L (Using max of 137 mmol/L) at 8/12/2024  2:47 AM  Total Bilirubin: 0.3 mg/dL (Using min of 1 mg/dL) at 8/12/2024  2:47 AM  Serum Albumin: 3.1 g/dL at 8/12/2024  2:47 AM  INR(ratio): 1 at 8/10/2024 10:57 AM  Age at listing (hypothetical): 44 years  Sex: Female at 8/12/2024  2:47 AM      Significant Labs:  CBC:  No results for input(s): "WBC", "HGB", "HCT", "PLT" in the last 48 hours.    CMP:  No results for input(s): "NA", "K", "CL", "CO2", "GLU", "BUN", "CREATININE", "CALCIUM", "PROT", "ALBUMIN", "BILITOT", "ALKPHOS", "AST", "ALT", "ANIONGAP", "EGFRNONAA" in the last 48 hours.    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 No sxs c/w acute chest or other more emergent conditions.  Pain regimen: scheduled tylenol, robaxin, and oxycodone   Continue Dilaudid 3mg IV q3h prn  mIVF calculated for ideal body weight.  Folic acid, hydroxyurea and other home SCD medications reordered.    Chronic prescription opiate use  Chronic pain meds for sickle cell      Hx pulmonary embolism  Multiple emboli noted in past.  Continue home anticoagulation.  Recent Trop and EKG negative but given left sided chest pain if it recurrs can consider repeat CTA    Drug-seeking " behavior  While admitted, informed by nursing patient asked if she could be given a little more than what's prescribed (not the full dosage) of her medications, no one has to know and asked if nursing needed some help to throw the items that were in their hand (the empty flush syringe and the empty med syringe).      Chronic gastritis  Continue home PPI.  Start Carafate given chest pain    Chronic anticoagulation  On Eliquis for history of PE      Intermittent asthma  Continue home albuterol PRN.      Hypertension  Patient's blood pressure range in the last 24 hours was: BP  Min: 119/73  Max: 161/95.The patient's inpatient anti-hypertensive regimen is listed below:  Current Antihypertensives  amLODIPine tablet 10 mg, Daily, Oral    Plan  - Restart home meds and adjust accordingly if needed.      VTE Risk Mitigation (From admission, onward)           Ordered     apixaban tablet 5 mg  2 times daily         12/29/24 2131     Reason for No Pharmacological VTE Prophylaxis  Once        Question:  Reasons:  Answer:  Already adequately anticoagulated on oral Anticoagulants    12/29/24 2046     IP VTE HIGH RISK PATIENT  Once         12/29/24 2046     Place sequential compression device  Until discontinued         12/29/24 2046                    Discharge Planning   ALYSE: 1/3/2025     Code Status: Full Code   Medical Readiness for Discharge Date:   Discharge Plan A: Home            High Risk Conditions:   Patient is currently receiving parenteral controlled substances: Neelam De La Cruz MD  Department of Hospital Medicine   [facility] - Internal Medicine Telemetry

## 2025-01-03 PROBLEM — M25.571 RIGHT ANKLE PAIN: Status: ACTIVE | Noted: 2025-01-03

## 2025-01-03 PROCEDURE — 63600175 PHARM REV CODE 636 W HCPCS: Performed by: STUDENT IN AN ORGANIZED HEALTH CARE EDUCATION/TRAINING PROGRAM

## 2025-01-03 PROCEDURE — 25000003 PHARM REV CODE 250: Performed by: STUDENT IN AN ORGANIZED HEALTH CARE EDUCATION/TRAINING PROGRAM

## 2025-01-03 PROCEDURE — 63600175 PHARM REV CODE 636 W HCPCS: Performed by: HOSPITALIST

## 2025-01-03 PROCEDURE — 11000001 HC ACUTE MED/SURG PRIVATE ROOM

## 2025-01-03 PROCEDURE — 25000003 PHARM REV CODE 250: Performed by: HOSPITALIST

## 2025-01-03 RX ORDER — GABAPENTIN 300 MG/1
600 CAPSULE ORAL 3 TIMES DAILY
Status: DISCONTINUED | OUTPATIENT
Start: 2025-01-03 | End: 2025-01-05 | Stop reason: HOSPADM

## 2025-01-03 RX ADMIN — SENNOSIDES AND DOCUSATE SODIUM 1 TABLET: 50; 8.6 TABLET ORAL at 08:01

## 2025-01-03 RX ADMIN — DIPHENHYDRAMINE HYDROCHLORIDE 50 MG: 25 CAPSULE ORAL at 07:01

## 2025-01-03 RX ADMIN — HYDROMORPHONE HYDROCHLORIDE 3 MG: 2 INJECTION INTRAMUSCULAR; INTRAVENOUS; SUBCUTANEOUS at 02:01

## 2025-01-03 RX ADMIN — FOLIC ACID 1 MG: 1 TABLET ORAL at 10:01

## 2025-01-03 RX ADMIN — HYDROMORPHONE HYDROCHLORIDE 3 MG: 2 INJECTION INTRAMUSCULAR; INTRAVENOUS; SUBCUTANEOUS at 08:01

## 2025-01-03 RX ADMIN — HYDROXYUREA 1000 MG: 500 CAPSULE ORAL at 10:01

## 2025-01-03 RX ADMIN — HYDROMORPHONE HYDROCHLORIDE 3 MG: 2 INJECTION INTRAMUSCULAR; INTRAVENOUS; SUBCUTANEOUS at 12:01

## 2025-01-03 RX ADMIN — PANTOPRAZOLE SODIUM 40 MG: 40 TABLET, DELAYED RELEASE ORAL at 08:01

## 2025-01-03 RX ADMIN — ACETAMINOPHEN 975 MG: 325 TABLET ORAL at 10:01

## 2025-01-03 RX ADMIN — GABAPENTIN 600 MG: 300 CAPSULE ORAL at 02:01

## 2025-01-03 RX ADMIN — SODIUM CHLORIDE, POTASSIUM CHLORIDE, SODIUM LACTATE AND CALCIUM CHLORIDE: 600; 310; 30; 20 INJECTION, SOLUTION INTRAVENOUS at 08:01

## 2025-01-03 RX ADMIN — METHOCARBAMOL TABLETS 750 MG: 750 TABLET, COATED ORAL at 10:01

## 2025-01-03 RX ADMIN — SUCRALFATE 1 G: 1 TABLET ORAL at 08:01

## 2025-01-03 RX ADMIN — HYDROMORPHONE HYDROCHLORIDE 3 MG: 2 INJECTION INTRAMUSCULAR; INTRAVENOUS; SUBCUTANEOUS at 05:01

## 2025-01-03 RX ADMIN — SUCRALFATE 1 G: 1 TABLET ORAL at 10:01

## 2025-01-03 RX ADMIN — METHOCARBAMOL TABLETS 750 MG: 750 TABLET, COATED ORAL at 08:01

## 2025-01-03 RX ADMIN — HYDROMORPHONE HYDROCHLORIDE 3 MG: 2 INJECTION INTRAMUSCULAR; INTRAVENOUS; SUBCUTANEOUS at 10:01

## 2025-01-03 RX ADMIN — SODIUM CHLORIDE, POTASSIUM CHLORIDE, SODIUM LACTATE AND CALCIUM CHLORIDE: 600; 310; 30; 20 INJECTION, SOLUTION INTRAVENOUS at 04:01

## 2025-01-03 RX ADMIN — ACETAMINOPHEN 975 MG: 325 TABLET ORAL at 02:01

## 2025-01-03 RX ADMIN — HYDROMORPHONE HYDROCHLORIDE 3 MG: 2 INJECTION INTRAMUSCULAR; INTRAVENOUS; SUBCUTANEOUS at 04:01

## 2025-01-03 RX ADMIN — GABAPENTIN 600 MG: 300 CAPSULE ORAL at 08:01

## 2025-01-03 RX ADMIN — OXYCODONE HYDROCHLORIDE 15 MG: 10 TABLET ORAL at 06:01

## 2025-01-03 RX ADMIN — HYDROMORPHONE HYDROCHLORIDE 3 MG: 2 INJECTION INTRAMUSCULAR; INTRAVENOUS; SUBCUTANEOUS at 07:01

## 2025-01-03 RX ADMIN — APIXABAN 5 MG: 5 TABLET, FILM COATED ORAL at 10:01

## 2025-01-03 RX ADMIN — SENNOSIDES AND DOCUSATE SODIUM 1 TABLET: 50; 8.6 TABLET ORAL at 10:01

## 2025-01-03 RX ADMIN — PANTOPRAZOLE SODIUM 40 MG: 40 TABLET, DELAYED RELEASE ORAL at 10:01

## 2025-01-03 RX ADMIN — OXYCODONE HYDROCHLORIDE 15 MG: 10 TABLET ORAL at 10:01

## 2025-01-03 RX ADMIN — SUCRALFATE 1 G: 1 TABLET ORAL at 05:01

## 2025-01-03 RX ADMIN — DIPHENHYDRAMINE HYDROCHLORIDE 50 MG: 25 CAPSULE ORAL at 01:01

## 2025-01-03 RX ADMIN — APIXABAN 5 MG: 5 TABLET, FILM COATED ORAL at 08:01

## 2025-01-03 RX ADMIN — ACETAMINOPHEN 975 MG: 325 TABLET ORAL at 08:01

## 2025-01-03 RX ADMIN — AMLODIPINE BESYLATE 10 MG: 10 TABLET ORAL at 10:01

## 2025-01-03 RX ADMIN — SUCRALFATE 1 G: 1 TABLET ORAL at 06:01

## 2025-01-03 RX ADMIN — METHOCARBAMOL TABLETS 750 MG: 750 TABLET, COATED ORAL at 02:01

## 2025-01-03 RX ADMIN — HYDROMORPHONE HYDROCHLORIDE 3 MG: 2 INJECTION INTRAMUSCULAR; INTRAVENOUS; SUBCUTANEOUS at 11:01

## 2025-01-03 NOTE — ASSESSMENT & PLAN NOTE
On 1/2 started having right ankle pain - maybe some swelling around an old scar  Check XR  Less concern for DVT given use of Eliquis  Anaphylaxis to NSAIDs so can't use Voltaren gel

## 2025-01-03 NOTE — PROGRESS NOTES
[facility] - Internal Medicine [facility] Medicine  Progress Note    Patient Name: Nazanin Malone  MRN: [id number]  Patient Class: IP- Inpatient   Admission Date: 12/29/2024  Length of Stay: 4 days  Attending Physician: Samira De La Cruz MD  Primary Care Provider: Pee Montgomery MD        Subjective     Principal Problem:Vaso-occlusive pain due to sickle cell disease        HPI:  By Dr. Nathalia Mitchell MD    Nazanin Malone is a 44 y.o. female with sickle cell beta thalassemia zero, multiple pulm emboli on chronic AC, asthma, and HTN who presents for pain in the left lower ribcage area of her chest and left breast as well as her BLE which began 1-2 days ago, feels c/w her prior pain crises, and has been worsening since despite taking her home regimen of tylenol, oxycodone 15mg, and muscle relaxers. She states that she did feel warm at one point and her temp was 100.8 but no higher temps than that. She also reports some ear pain BL but R>L. Reports she has been around someone who had the flu recently. Some nausea but no vomiting. She does have port of her right chest that was last accessed ~12/18 when she was in the hospital for a pain crisis. Denies any pain, redness, swelling, discharge of her port site. Denied cough, sore throat, shortness of breath, wheezing, abd pain, dysuria, headaches, weakness.     She reports that she moved to the area about 6 weeks ago after living in [place]. She was receiving chronic exchange transfusions there but is not currently receiving them as part of her therapy here. She is seeing a hematologist. She also reports that she previously was on MS contin, pen VK, and hydroxurea as part of her home regimen but has not been put back on those since she has moved here.     Overview/Hospital Course:  Ms. Malone was admitted to Hospital Medicine for management of a sickle cell pain crisis.  She was started on Dilaudid 3mg IV q3h prn and IVF.  While admitted, informed by  nursing patient asked if she could be given a little more than what's prescribed (not the full dosage) of her medications, no one has to know and asked if nursing needed some help to throw the items that were in their hand (the empty flush syringe and the empty med syringe).    Interval History: No acute events overnight. Endorses pain in her right leg again today, specifically the ankle.  Will check XR to evaluate.    Review of Systems   Constitutional:  Negative for chills, fatigue and fever.   Respiratory:  Negative for cough and shortness of breath.    Cardiovascular:  Negative for chest pain, palpitations and leg swelling.   Gastrointestinal:  Negative for abdominal pain, diarrhea, nausea and vomiting.   Genitourinary:  Negative for dysuria and urgency.   Musculoskeletal:  Positive for arthralgias and myalgias.   Neurological:  Negative for dizziness and headaches.   All other systems reviewed and are negative.    Objective:     Vital Signs (Most Recent):  Temp: 98.6 °F (37 °C) (01/03/25 0752)  Pulse: 82 (01/03/25 0752)  Resp: 18 (01/03/25 0752)  BP: 138/88 (01/03/25 0752)  SpO2: 99 % (01/03/25 0752) Vital Signs (24h Range):  Temp:  [98.6 °F (37 °C)-99.2 °F (37.3 °C)] 98.6 °F (37 °C)  Pulse:  [73-85] 82  Resp:  [16-18] 18  SpO2:  [98 %-99 %] 99 %  BP: (114-138)/(74-90) 138/88     Weight: 97.5 kg (215 lb)  Body mass index is 39.32 kg/m².  No intake or output data in the 24 hours ending 01/03/25 0920     Physical Exam  Constitutional:       Appearance: Normal appearance. She is obese.      Comments: Appears comfortable   HENT:      Head: Normocephalic and atraumatic.   Cardiovascular:      Rate and Rhythm: Normal rate and regular rhythm.      Heart sounds: No murmur heard.  Pulmonary:      Effort: Pulmonary effort is normal. No respiratory distress.      Breath sounds: Normal breath sounds. No wheezing or rales.   Abdominal:      General: There is no distension.      Palpations: Abdomen is soft.      Tenderness:  There is no abdominal tenderness.   Musculoskeletal:         General: Swelling (Maybe some mild swelling around right ankle old scar) and tenderness (Extremities, chest , back) present. No deformity.   Skin:     General: Skin is warm and dry.      Findings: Lesion (Right shin old scar) present.   Neurological:      General: No focal deficit present.      Mental Status: She is alert and oriented to person, place, and time. Mental status is at baseline.         MELD 3.0: 12 at 8/12/2024  2:47 AM  MELD-Na: 10 at 8/12/2024  2:47 AM  Calculated from:  Serum Creatinine: 1.5 mg/dL at 8/12/2024  2:47 AM  Serum Sodium: 139 mmol/L (Using max of 137 mmol/L) at 8/12/2024  2:47 AM  Total Bilirubin: 0.3 mg/dL (Using min of 1 mg/dL) at 8/12/2024  2:47 AM  Serum Albumin: 3.1 g/dL at 8/12/2024  2:47 AM  INR(ratio): 1 at 8/10/2024 10:57 AM  Age at listing (hypothetical): 44 years  Sex: Female at 8/12/2024  2:47 AM      Significant Labs:  CBC:  Recent Labs   Lab 01/02/25  0924   WBC 4.07   HGB 8.6*   HCT 26.7*   *       CMP:  Recent Labs   Lab 01/02/25  0924      K 3.4*      CO2 28      BUN 12   CREATININE 1.0   CALCIUM 9.1   PROT 7.0   ALBUMIN 3.2*   BILITOT 0.3   ALKPHOS 70   AST 15   ALT 8*   ANIONGAP 7*           Assessment and Plan     * Vaso-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 No sxs c/w acute chest or other more emergent conditions.  Pain regimen: scheduled tylenol, robaxin, and oxycodone   Continue Dilaudid 3mg IV q3h prn  mIVF calculated for ideal body weight.  Folic acid, hydroxyurea and other home SCD medications reordered.    Right ankle pain  On 1/2 started having right ankle pain - maybe some swelling around an old scar  Check XR  Less concern for DVT given use of Eliquis  Anaphylaxis to NSAIDs so can't use Voltaren  gel      Chronic prescription opiate use  Chronic pain meds for sickle cell      Hx pulmonary embolism  Multiple emboli noted in past.  Continue home anticoagulation.  Recent Trop and EKG negative but given left sided chest pain if it recurrs can consider repeat CTA    Drug-seeking behavior  While admitted, informed by nursing patient asked if she could be given a little more than what's prescribed (not the full dosage) of her medications, no one has to know and asked if nursing needed some help to throw the items that were in their hand (the empty flush syringe and the empty med syringe).      Chronic gastritis  Continue home PPI.  Start Carafate given chest pain    Chronic anticoagulation  On Eliquis for history of PE      Intermittent asthma  Continue home albuterol PRN.      Hypertension  Patient's blood pressure range in the last 24 hours was: BP  Min: 114/74  Max: 138/88.The patient's inpatient anti-hypertensive regimen is listed below:  Current Antihypertensives  amLODIPine tablet 10 mg, Daily, Oral    Plan  - Restart home meds and adjust accordingly if needed.      VTE Risk Mitigation (From admission, onward)           Ordered     apixaban tablet 5 mg  2 times daily         12/29/24 2131     Reason for No Pharmacological VTE Prophylaxis  Once        Question:  Reasons:  Answer:  Already adequately anticoagulated on oral Anticoagulants    12/29/24 2046     IP VTE HIGH RISK PATIENT  Once         12/29/24 2046     Place sequential compression device  Until discontinued         12/29/24 2046                    Discharge Planning   ALYSE: 1/4/2025     Code Status: Full Code   Medical Readiness for Discharge Date:   Discharge Plan A: Home            High Risk Conditions:   Patient is currently receiving parenteral controlled substances: [place]             Samira De La Cruz MD  Department of Hospital Medicine   [facility] - Internal Medicine Telemetry

## 2025-01-03 NOTE — ASSESSMENT & PLAN NOTE
Patient's blood pressure range in the last 24 hours was: BP  Min: 114/74  Max: 138/88.The patient's inpatient anti-hypertensive regimen is listed below:  Current Antihypertensives  amLODIPine tablet 10 mg, Daily, Oral    Plan  - Restart home meds and adjust accordingly if needed.

## 2025-01-03 NOTE — PLAN OF CARE
Problem: Adult Inpatient Plan of Care  Goal: Plan of Care Review  Outcome: Progressing  Goal: Patient-Specific Goal (Individualized)  Outcome: Progressing  Goal: Absence of Hospital-Acquired Illness or Injury  Outcome: Progressing  Goal: Optimal Comfort and Wellbeing  Outcome: Progressing  Goal: Readiness for Transition of Care  Outcome: Progressing     Problem: Fall Injury Risk  Goal: Absence of Fall and Fall-Related Injury  Outcome: Progressing     Problem: Sickle Cell Disease  Goal: Optimal Cerebral Tissue Perfusion  Outcome: Progressing  Goal: Optimal Coping with Sickle Cell Disease  Outcome: Progressing  Goal: Effective Tissue Perfusion  Outcome: Progressing  Goal: Absence of Infection Signs and Symptoms  Outcome: Progressing  Goal: Optimal Pain Control and Function  Outcome: Progressing  Goal: Optimal Oxygenation  Outcome: Progressing

## 2025-01-03 NOTE — SUBJECTIVE & OBJECTIVE
Interval History: No acute events overnight. Endorses pain in her right leg again today, specifically the ankle.  Will check XR to evaluate.    Review of Systems   Constitutional:  Negative for chills, fatigue and fever.   Respiratory:  Negative for cough and shortness of breath.    Cardiovascular:  Negative for chest pain, palpitations and leg swelling.   Gastrointestinal:  Negative for abdominal pain, diarrhea, nausea and vomiting.   Genitourinary:  Negative for dysuria and urgency.   Musculoskeletal:  Positive for arthralgias and myalgias.   Neurological:  Negative for dizziness and headaches.   All other systems reviewed and are negative.    Objective:     Vital Signs (Most Recent):  Temp: 98.6 °F (37 °C) (01/03/25 0752)  Pulse: 82 (01/03/25 0752)  Resp: 18 (01/03/25 0752)  BP: 138/88 (01/03/25 0752)  SpO2: 99 % (01/03/25 0752) Vital Signs (24h Range):  Temp:  [98.6 °F (37 °C)-99.2 °F (37.3 °C)] 98.6 °F (37 °C)  Pulse:  [73-85] 82  Resp:  [16-18] 18  SpO2:  [98 %-99 %] 99 %  BP: (114-138)/(74-90) 138/88     Weight: 97.5 kg (215 lb)  Body mass index is 39.32 kg/m².  No intake or output data in the 24 hours ending 01/03/25 0920     Physical Exam  Constitutional:       Appearance: Normal appearance. She is obese.      Comments: Appears comfortable   HENT:      Head: Normocephalic and atraumatic.   Cardiovascular:      Rate and Rhythm: Normal rate and regular rhythm.      Heart sounds: No murmur heard.  Pulmonary:      Effort: Pulmonary effort is normal. No respiratory distress.      Breath sounds: Normal breath sounds. No wheezing or rales.   Abdominal:      General: There is no distension.      Palpations: Abdomen is soft.      Tenderness: There is no abdominal tenderness.   Musculoskeletal:         General: Swelling (Maybe some mild swelling around right ankle old scar) and tenderness (Extremities, chest , back) present. No deformity.   Skin:     General: Skin is warm and dry.      Findings: Lesion (Right shin  old scar) present.   Neurological:      General: No focal deficit present.      Mental Status: She is alert and oriented to person, place, and time. Mental status is at baseline.         MELD 3.0: 12 at 8/12/2024  2:47 AM  MELD-Na: 10 at 8/12/2024  2:47 AM  Calculated from:  Serum Creatinine: 1.5 mg/dL at 8/12/2024  2:47 AM  Serum Sodium: 139 mmol/L (Using max of 137 mmol/L) at 8/12/2024  2:47 AM  Total Bilirubin: 0.3 mg/dL (Using min of 1 mg/dL) at 8/12/2024  2:47 AM  Serum Albumin: 3.1 g/dL at 8/12/2024  2:47 AM  INR(ratio): 1 at 8/10/2024 10:57 AM  Age at listing (hypothetical): 44 years  Sex: Female at 8/12/2024  2:47 AM      Significant Labs:  CBC:  Recent Labs   Lab 01/02/25  0924   WBC 4.07   HGB 8.6*   HCT 26.7*   *       CMP:  Recent Labs   Lab 01/02/25  0924      K 3.4*      CO2 28      BUN 12   CREATININE 1.0   CALCIUM 9.1   PROT 7.0   ALBUMIN 3.2*   BILITOT 0.3   ALKPHOS 70   AST 15   ALT 8*   ANIONGAP 7*

## 2025-01-04 PROBLEM — M25.571 RIGHT ANKLE PAIN: Status: RESOLVED | Noted: 2025-01-03 | Resolved: 2025-01-04

## 2025-01-04 PROCEDURE — 25000003 PHARM REV CODE 250: Performed by: HOSPITALIST

## 2025-01-04 PROCEDURE — 25000003 PHARM REV CODE 250: Performed by: STUDENT IN AN ORGANIZED HEALTH CARE EDUCATION/TRAINING PROGRAM

## 2025-01-04 PROCEDURE — 63600175 PHARM REV CODE 636 W HCPCS: Performed by: HOSPITALIST

## 2025-01-04 PROCEDURE — 11000001 HC ACUTE MED/SURG PRIVATE ROOM

## 2025-01-04 RX ADMIN — HYDROXYZINE HYDROCHLORIDE 50 MG: 25 TABLET ORAL at 11:01

## 2025-01-04 RX ADMIN — HYDROMORPHONE HYDROCHLORIDE 3 MG: 2 INJECTION INTRAMUSCULAR; INTRAVENOUS; SUBCUTANEOUS at 03:01

## 2025-01-04 RX ADMIN — OXYCODONE HYDROCHLORIDE 15 MG: 10 TABLET ORAL at 03:01

## 2025-01-04 RX ADMIN — SENNOSIDES AND DOCUSATE SODIUM 1 TABLET: 50; 8.6 TABLET ORAL at 09:01

## 2025-01-04 RX ADMIN — PANTOPRAZOLE SODIUM 40 MG: 40 TABLET, DELAYED RELEASE ORAL at 09:01

## 2025-01-04 RX ADMIN — GABAPENTIN 600 MG: 300 CAPSULE ORAL at 03:01

## 2025-01-04 RX ADMIN — METHOCARBAMOL TABLETS 750 MG: 750 TABLET, COATED ORAL at 08:01

## 2025-01-04 RX ADMIN — HYDROMORPHONE HYDROCHLORIDE 3 MG: 2 INJECTION INTRAMUSCULAR; INTRAVENOUS; SUBCUTANEOUS at 01:01

## 2025-01-04 RX ADMIN — METHOCARBAMOL TABLETS 750 MG: 750 TABLET, COATED ORAL at 03:01

## 2025-01-04 RX ADMIN — APIXABAN 5 MG: 5 TABLET, FILM COATED ORAL at 09:01

## 2025-01-04 RX ADMIN — HYDROMORPHONE HYDROCHLORIDE 3 MG: 2 INJECTION INTRAMUSCULAR; INTRAVENOUS; SUBCUTANEOUS at 11:01

## 2025-01-04 RX ADMIN — HYDROMORPHONE HYDROCHLORIDE 3 MG: 2 INJECTION INTRAMUSCULAR; INTRAVENOUS; SUBCUTANEOUS at 08:01

## 2025-01-04 RX ADMIN — SUCRALFATE 1 G: 1 TABLET ORAL at 06:01

## 2025-01-04 RX ADMIN — SUCRALFATE 1 G: 1 TABLET ORAL at 05:01

## 2025-01-04 RX ADMIN — FOLIC ACID 1 MG: 1 TABLET ORAL at 09:01

## 2025-01-04 RX ADMIN — PANTOPRAZOLE SODIUM 40 MG: 40 TABLET, DELAYED RELEASE ORAL at 08:01

## 2025-01-04 RX ADMIN — APIXABAN 5 MG: 5 TABLET, FILM COATED ORAL at 08:01

## 2025-01-04 RX ADMIN — ACETAMINOPHEN 975 MG: 325 TABLET ORAL at 09:01

## 2025-01-04 RX ADMIN — HYDROXYUREA 1000 MG: 500 CAPSULE ORAL at 09:01

## 2025-01-04 RX ADMIN — SUCRALFATE 1 G: 1 TABLET ORAL at 08:01

## 2025-01-04 RX ADMIN — HYDROMORPHONE HYDROCHLORIDE 3 MG: 2 INJECTION INTRAMUSCULAR; INTRAVENOUS; SUBCUTANEOUS at 09:01

## 2025-01-04 RX ADMIN — ACETAMINOPHEN 975 MG: 325 TABLET ORAL at 08:01

## 2025-01-04 RX ADMIN — OXYCODONE HYDROCHLORIDE 15 MG: 10 TABLET ORAL at 04:01

## 2025-01-04 RX ADMIN — GABAPENTIN 600 MG: 300 CAPSULE ORAL at 08:01

## 2025-01-04 RX ADMIN — AMLODIPINE BESYLATE 10 MG: 10 TABLET ORAL at 09:01

## 2025-01-04 RX ADMIN — METHOCARBAMOL TABLETS 750 MG: 750 TABLET, COATED ORAL at 09:01

## 2025-01-04 RX ADMIN — GABAPENTIN 600 MG: 300 CAPSULE ORAL at 09:01

## 2025-01-04 RX ADMIN — SUCRALFATE 1 G: 1 TABLET ORAL at 01:01

## 2025-01-04 RX ADMIN — SENNOSIDES AND DOCUSATE SODIUM 1 TABLET: 50; 8.6 TABLET ORAL at 08:01

## 2025-01-04 RX ADMIN — ACETAMINOPHEN 975 MG: 325 TABLET ORAL at 03:01

## 2025-01-04 RX ADMIN — HYDROMORPHONE HYDROCHLORIDE 3 MG: 2 INJECTION INTRAMUSCULAR; INTRAVENOUS; SUBCUTANEOUS at 05:01

## 2025-01-04 RX ADMIN — HYDROMORPHONE HYDROCHLORIDE 3 MG: 2 INJECTION INTRAMUSCULAR; INTRAVENOUS; SUBCUTANEOUS at 06:01

## 2025-01-04 NOTE — ASSESSMENT & PLAN NOTE
Pt presenting w/ similar symptomology to prior events without obvious inciting cause although appears to have some gap between her prior home pain regimen and currently. No sxs c/w acute chest or other more emergent conditions.  Pain regimen: scheduled tylenol, robaxin, and oxycodone   Continue Dilaudid 3mg IV q3h prn  mIVF calculated for ideal body weight. Now holding fluids and encouraging oral intake  Folic acid, hydroxyurea and other home SCD medications reordered.

## 2025-01-04 NOTE — ASSESSMENT & PLAN NOTE
Patient's blood pressure range in the last 24 hours was: BP  Min: 115/72  Max: 167/81.The patient's inpatient anti-hypertensive regimen is listed below:  Current Antihypertensives  amLODIPine tablet 10 mg, Daily, Oral    Plan  - BP stable, continue holding BP medication. Restart home meds and adjust accordingly if needed.

## 2025-01-04 NOTE — SUBJECTIVE & OBJECTIVE
Interval History: NAEON and VSS    Review of Systems   Constitutional:  Negative for chills and fever.   HENT:  Negative for ear pain and sinus pain.    Eyes:  Negative for pain.   Respiratory:  Negative for cough and shortness of breath.    Cardiovascular:  Negative for chest pain and leg swelling.   Gastrointestinal:  Negative for abdominal pain, constipation, diarrhea, nausea, rectal pain and vomiting.   Genitourinary:  Negative for dysuria and flank pain.   Musculoskeletal:  Negative for arthralgias, back pain, joint swelling, myalgias and neck pain.   Neurological:  Negative for weakness, numbness and headaches.   Psychiatric/Behavioral:  Negative for agitation, dysphoric mood and sleep disturbance. The patient is not nervous/anxious.      Objective:     Vital Signs (Most Recent):  Temp: 98.3 °F (36.8 °C) (01/04/25 0811)  Pulse: 71 (01/04/25 0811)  Resp: 16 (01/04/25 0910)  BP: 115/72 (01/04/25 0811)  SpO2: 98 % (01/04/25 0811) Vital Signs (24h Range):  Temp:  [98.3 °F (36.8 °C)-98.9 °F (37.2 °C)] 98.3 °F (36.8 °C)  Pulse:  [71-76] 71  Resp:  [16-18] 16  SpO2:  [98 %-100 %] 98 %  BP: (115-167)/(72-91) 115/72     Weight: 97.5 kg (215 lb)  Body mass index is 39.32 kg/m².    Intake/Output Summary (Last 24 hours) at 1/4/2025 1038  Last data filed at 1/4/2025 0740  Gross per 24 hour   Intake 6825 ml   Output --   Net 6825 ml      Physical Exam  Constitutional:       General: She is not in acute distress.     Appearance: Normal appearance. She is not ill-appearing, toxic-appearing or diaphoretic.   HENT:      Head: Normocephalic and atraumatic.      Right Ear: External ear normal.      Left Ear: External ear normal.      Nose: Nose normal.      Mouth/Throat:      Mouth: Mucous membranes are moist.   Eyes:      General: No scleral icterus.        Right eye: No discharge.         Left eye: No discharge.      Extraocular Movements: Extraocular movements intact.   Cardiovascular:      Rate and Rhythm: Normal rate.       Heart sounds: Normal heart sounds. No murmur heard.     No friction rub.   Pulmonary:      Effort: Pulmonary effort is normal. No respiratory distress.      Breath sounds: Normal breath sounds. No stridor. No wheezing, rhonchi or rales.   Abdominal:      General: Abdomen is flat. Bowel sounds are normal. There is no distension.   Musculoskeletal:         General: No swelling.      Cervical back: Normal range of motion.      Right lower leg: No edema.      Left lower leg: No edema.   Skin:     General: Skin is warm and dry.      Capillary Refill: Capillary refill takes less than 2 seconds.      Coloration: Skin is not jaundiced or pale.      Findings: No bruising.   Neurological:      General: No focal deficit present.      Mental Status: She is alert and oriented to person, place, and time.      Motor: No weakness.   Psychiatric:         Mood and Affect: Mood normal.         Behavior: Behavior normal.         Thought Content: Thought content normal.         MELD 3.0: 12 at 8/12/2024  2:47 AM  MELD-Na: 10 at 8/12/2024  2:47 AM  Calculated from:  Serum Creatinine: 1.5 mg/dL at 8/12/2024  2:47 AM  Serum Sodium: 139 mmol/L (Using max of 137 mmol/L) at 8/12/2024  2:47 AM  Total Bilirubin: 0.3 mg/dL (Using min of 1 mg/dL) at 8/12/2024  2:47 AM  Serum Albumin: 3.1 g/dL at 8/12/2024  2:47 AM  INR(ratio): 1 at 8/10/2024 10:57 AM  Age at listing (hypothetical): 44 years  Sex: Female at 8/12/2024  2:47 AM      Significant Labs:  All labs reviewed    None

## 2025-01-04 NOTE — PROGRESS NOTES
[facility] - Internal Medicine [facility]  Progress Note    Patient Name: Nazanin Malone  MRN: [id number]  Patient Class: IP- Inpatient   Admission Date: 12/29/2024  Length of Stay: 5 days  Attending Physician: Hazel Urena MD  Primary Care Provider: Pee Montgomery MD        Subjective     Principal Problem:Vaso-occlusive pain due to sickle cell disease        HPI:  By Dr. Nathalia Mitchell MD    Nazanin Malone is a 44 y.o. female with sickle cell beta thalassemia zero, multiple pulm emboli on chronic AC, asthma, and HTN who presents for pain in the left lower ribcage area of her chest and left breast as well as her BLE which began 1-2 days ago, feels c/w her prior pain crises, and has been worsening since despite taking her home regimen of tylenol, oxycodone 15mg, and muscle relaxers. She states that she did feel warm at one point and her temp was 100.8 but no higher temps than that. She also reports some ear pain BL but R>L. Reports she has been around someone who had the flu recently. Some nausea but no vomiting. She does have port of her right chest that was last accessed ~12/18 when she was in the hospital for a pain crisis. Denies any pain, redness, swelling, discharge of her port site. Denied cough, sore throat, shortness of breath, wheezing, abd pain, dysuria, headaches, weakness.     She reports that she moved to the area about 6 weeks ago after living in [place]. She was receiving chronic exchange transfusions there but is not currently receiving them as part of her therapy here. She is seeing a hematologist. She also reports that she previously was on MS contin, pen VK, and hydroxurea as part of her home regimen but has not been put back on those since she has moved here.     Overview/Hospital Course:  Ms. Malone was admitted to Hospital Medicine for management of a sickle cell pain crisis.  She was started on Dilaudid 3mg IV q3h prn and IVF.  During admission, informed by nursing  patient asked if she could be given a little more than what's prescribed (not the full dosage) of her medications, no one has to know and asked if nursing needed some help to throw the items that were in their hand (the empty flush syringe and the empty med syringe).     Interval History: NAEON and VSS    Review of Systems   Constitutional:  Negative for chills and fever.   HENT:  Negative for ear pain and sinus pain.    Eyes:  Negative for pain.   Respiratory:  Negative for cough and shortness of breath.    Cardiovascular:  Negative for chest pain and leg swelling.   Gastrointestinal:  Negative for abdominal pain, constipation, diarrhea, nausea, rectal pain and vomiting.   Genitourinary:  Negative for dysuria and flank pain.   Musculoskeletal:  Negative for arthralgias, back pain, joint swelling, myalgias and neck pain.   Neurological:  Negative for weakness, numbness and headaches.   Psychiatric/Behavioral:  Negative for agitation, dysphoric mood and sleep disturbance. The patient is not nervous/anxious.      Objective:     Vital Signs (Most Recent):  Temp: 98.3 °F (36.8 °C) (01/04/25 0811)  Pulse: 71 (01/04/25 0811)  Resp: 16 (01/04/25 0910)  BP: 115/72 (01/04/25 0811)  SpO2: 98 % (01/04/25 0811) Vital Signs (24h Range):  Temp:  [98.3 °F (36.8 °C)-98.9 °F (37.2 °C)] 98.3 °F (36.8 °C)  Pulse:  [71-76] 71  Resp:  [16-18] 16  SpO2:  [98 %-100 %] 98 %  BP: (115-167)/(72-91) 115/72     Weight: 97.5 kg (215 lb)  Body mass index is 39.32 kg/m².    Intake/Output Summary (Last 24 hours) at 1/4/2025 1038  Last data filed at 1/4/2025 0740  Gross per 24 hour   Intake 6825 ml   Output --   Net 6825 ml      Physical Exam  Constitutional:       General: She is not in acute distress.     Appearance: Normal appearance. She is not ill-appearing, toxic-appearing or diaphoretic.   HENT:      Head: Normocephalic and atraumatic.      Right Ear: External ear normal.      Left Ear: External ear normal.      Nose: Nose normal.       Mouth/Throat:      Mouth: Mucous membranes are moist.   Eyes:      General: No scleral icterus.        Right eye: No discharge.         Left eye: No discharge.      Extraocular Movements: Extraocular movements intact.   Cardiovascular:      Rate and Rhythm: Normal rate.      Heart sounds: Normal heart sounds. No murmur heard.     No friction rub.   Pulmonary:      Effort: Pulmonary effort is normal. No respiratory distress.      Breath sounds: Normal breath sounds. No stridor. No wheezing, rhonchi or rales.   Abdominal:      General: Abdomen is flat. Bowel sounds are normal. There is no distension.   Musculoskeletal:         General: No swelling.      Cervical back: Normal range of motion.      Right lower leg: No edema.      Left lower leg: No edema.   Skin:     General: Skin is warm and dry.      Capillary Refill: Capillary refill takes less than 2 seconds.      Coloration: Skin is not jaundiced or pale.      Findings: No bruising.   Neurological:      General: No focal deficit present.      Mental Status: She is alert and oriented to person, place, and time.      Motor: No weakness.   Psychiatric:         Mood and Affect: Mood normal.         Behavior: Behavior normal.         Thought Content: Thought content normal.         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 No sxs c/w acute chest or other more emergent conditions.  Pain regimen: scheduled tylenol, robaxin, and oxycodone   Continue Dilaudid 3mg IV q3h prn  mIVF calculated for ideal body weight. Now holding fluids and encouraging oral intake  Folic acid, hydroxyurea and other home SCD medications reordered.    Chronic prescription opiate use  Chronic pain meds for sickle cell      Hx pulmonary embolism  Multiple emboli noted in past.  Continue home anticoagulation.  Recent Trop and EKG negative but given left sided chest pain if it recurrs can consider repeat CTA    Drug-seeking behavior  While admitted, informed by nursing patient asked if she could be given a little more than what's prescribed (not the full dosage) of her medications, no one has to know and asked if nursing needed some help to throw the items that were in their hand (the empty flush syringe and the empty med syringe).      Chronic gastritis  Continue home PPI.  Continue carafate    Chronic anticoagulation  On Eliquis for history of PE      Intermittent asthma  Continue home albuterol PRN.      Hypertension  Patient's blood pressure range in the last 24 hours was: BP  Min: 115/72  Max: 167/81.The patient's inpatient anti-hypertensive regimen is listed below:  Current Antihypertensives  amLODIPine tablet 10 mg, Daily, Oral    Plan  - BP stable, continue holding BP medication. Restart home meds and adjust accordingly if needed.      VTE Risk Mitigation (From admission, onward)           Ordered     apixaban tablet 5 mg  2 times daily         12/29/24 2131     Reason for No Pharmacological VTE Prophylaxis  Once        Question:  Reasons:  Answer:  Already adequately anticoagulated on oral Anticoagulants    12/29/24 2046     IP VTE HIGH RISK PATIENT  Once         12/29/24 2046     Place sequential compression device  Until discontinued         12/29/24 2046                    Discharge Planning   ALYSE: 1/4/2025      Code Status: Full Code   Medical Readiness for Discharge Date:   Discharge Plan A: Home                        Hazel Urena MD  Department of Hospital Medicine   [facility] - Internal Medicine Telemetry

## 2025-01-05 VITALS
HEART RATE: 78 BPM | OXYGEN SATURATION: 99 % | RESPIRATION RATE: 16 BRPM | SYSTOLIC BLOOD PRESSURE: 149 MMHG | BODY MASS INDEX: 39.56 KG/M2 | TEMPERATURE: 99 F | DIASTOLIC BLOOD PRESSURE: 89 MMHG | HEIGHT: 62 IN | WEIGHT: 215 LBS

## 2025-01-05 LAB
ALBUMIN SERPL BCP-MCNC: 3.3 G/DL (ref 3.5–5.2)
ALP SERPL-CCNC: 74 U/L (ref 40–150)
ALT SERPL W/O P-5'-P-CCNC: 5 U/L (ref 10–44)
ANION GAP SERPL CALC-SCNC: 8 MMOL/L (ref 8–16)
AST SERPL-CCNC: 14 U/L (ref 10–40)
BASOPHILS # BLD AUTO: 0.06 K/UL (ref 0–0.2)
BASOPHILS NFR BLD: 1.1 % (ref 0–1.9)
BILIRUB SERPL-MCNC: 0.3 MG/DL (ref 0.1–1)
BUN SERPL-MCNC: 15 MG/DL (ref 6–20)
CALCIUM SERPL-MCNC: 9.1 MG/DL (ref 8.7–10.5)
CHLORIDE SERPL-SCNC: 108 MMOL/L (ref 95–110)
CO2 SERPL-SCNC: 24 MMOL/L (ref 23–29)
CREAT SERPL-MCNC: 1 MG/DL (ref 0.5–1.4)
DIFFERENTIAL METHOD BLD: ABNORMAL
EOSINOPHIL # BLD AUTO: 0.3 K/UL (ref 0–0.5)
EOSINOPHIL NFR BLD: 5.8 % (ref 0–8)
ERYTHROCYTE [DISTWIDTH] IN BLOOD BY AUTOMATED COUNT: 21.1 % (ref 11.5–14.5)
EST. GFR  (NO RACE VARIABLE): >60 ML/MIN/1.73 M^2
GLUCOSE SERPL-MCNC: 83 MG/DL (ref 70–110)
HCT VFR BLD AUTO: 26.9 % (ref 37–48.5)
HGB BLD-MCNC: 8.7 G/DL (ref 12–16)
IMM GRANULOCYTES # BLD AUTO: 0 K/UL (ref 0–0.04)
IMM GRANULOCYTES NFR BLD AUTO: 0 % (ref 0–0.5)
LYMPHOCYTES # BLD AUTO: 3.3 K/UL (ref 1–4.8)
LYMPHOCYTES NFR BLD: 58.6 % (ref 18–48)
MAGNESIUM SERPL-MCNC: 1.8 MG/DL (ref 1.6–2.6)
MCH RBC QN AUTO: 21 PG (ref 27–31)
MCHC RBC AUTO-ENTMCNC: 32.3 G/DL (ref 32–36)
MCV RBC AUTO: 65 FL (ref 82–98)
MONOCYTES # BLD AUTO: 0.5 K/UL (ref 0.3–1)
MONOCYTES NFR BLD: 9 % (ref 4–15)
NEUTROPHILS # BLD AUTO: 1.4 K/UL (ref 1.8–7.7)
NEUTROPHILS NFR BLD: 25.5 % (ref 38–73)
NRBC BLD-RTO: 1 /100 WBC
PHOSPHATE SERPL-MCNC: 3.4 MG/DL (ref 2.7–4.5)
PLATELET # BLD AUTO: 465 K/UL (ref 150–450)
PMV BLD AUTO: 9.2 FL (ref 9.2–12.9)
POTASSIUM SERPL-SCNC: 3.6 MMOL/L (ref 3.5–5.1)
PROT SERPL-MCNC: 7.1 G/DL (ref 6–8.4)
RBC # BLD AUTO: 4.14 M/UL (ref 4–5.4)
RETICS/RBC NFR AUTO: 1.1 % (ref 0.5–2.5)
SODIUM SERPL-SCNC: 140 MMOL/L (ref 136–145)
WBC # BLD AUTO: 5.65 K/UL (ref 3.9–12.7)

## 2025-01-05 PROCEDURE — 83735 ASSAY OF MAGNESIUM: CPT

## 2025-01-05 PROCEDURE — 25000003 PHARM REV CODE 250: Performed by: HOSPITALIST

## 2025-01-05 PROCEDURE — 85025 COMPLETE CBC W/AUTO DIFF WBC: CPT

## 2025-01-05 PROCEDURE — 63600175 PHARM REV CODE 636 W HCPCS: Performed by: HOSPITALIST

## 2025-01-05 PROCEDURE — 36415 COLL VENOUS BLD VENIPUNCTURE: CPT

## 2025-01-05 PROCEDURE — 63600175 PHARM REV CODE 636 W HCPCS

## 2025-01-05 PROCEDURE — 25000003 PHARM REV CODE 250: Performed by: STUDENT IN AN ORGANIZED HEALTH CARE EDUCATION/TRAINING PROGRAM

## 2025-01-05 PROCEDURE — 80053 COMPREHEN METABOLIC PANEL: CPT

## 2025-01-05 PROCEDURE — 84100 ASSAY OF PHOSPHORUS: CPT

## 2025-01-05 PROCEDURE — 85045 AUTOMATED RETICULOCYTE COUNT: CPT

## 2025-01-05 RX ORDER — HEPARIN 100 UNIT/ML
300 SYRINGE INTRAVENOUS ONCE
Status: COMPLETED | OUTPATIENT
Start: 2025-01-05 | End: 2025-01-05

## 2025-01-05 RX ORDER — OXYCODONE HYDROCHLORIDE 15 MG/1
15 TABLET ORAL EVERY 4 HOURS PRN
Qty: 42 TABLET | Refills: 0 | Status: ON HOLD | OUTPATIENT
Start: 2025-01-05 | End: 2025-01-17 | Stop reason: SDUPTHER

## 2025-01-05 RX ORDER — TIZANIDINE 2 MG/1
4 TABLET ORAL EVERY 8 HOURS PRN
Qty: 21 TABLET | Refills: 0 | Status: ON HOLD | OUTPATIENT
Start: 2025-01-05 | End: 2025-01-25 | Stop reason: HOSPADM

## 2025-01-05 RX ADMIN — HEPARIN 300 UNITS: 100 SYRINGE at 04:01

## 2025-01-05 RX ADMIN — GABAPENTIN 600 MG: 300 CAPSULE ORAL at 04:01

## 2025-01-05 RX ADMIN — METHOCARBAMOL TABLETS 750 MG: 750 TABLET, COATED ORAL at 04:01

## 2025-01-05 RX ADMIN — FOLIC ACID 1 MG: 1 TABLET ORAL at 09:01

## 2025-01-05 RX ADMIN — HYDROMORPHONE HYDROCHLORIDE 3 MG: 2 INJECTION INTRAMUSCULAR; INTRAVENOUS; SUBCUTANEOUS at 06:01

## 2025-01-05 RX ADMIN — SUCRALFATE 1 G: 1 TABLET ORAL at 12:01

## 2025-01-05 RX ADMIN — GABAPENTIN 600 MG: 300 CAPSULE ORAL at 09:01

## 2025-01-05 RX ADMIN — AMLODIPINE BESYLATE 10 MG: 10 TABLET ORAL at 09:01

## 2025-01-05 RX ADMIN — METHOCARBAMOL TABLETS 750 MG: 750 TABLET, COATED ORAL at 09:01

## 2025-01-05 RX ADMIN — SENNOSIDES AND DOCUSATE SODIUM 1 TABLET: 50; 8.6 TABLET ORAL at 09:01

## 2025-01-05 RX ADMIN — APIXABAN 5 MG: 5 TABLET, FILM COATED ORAL at 09:01

## 2025-01-05 RX ADMIN — HYDROXYUREA 1000 MG: 500 CAPSULE ORAL at 09:01

## 2025-01-05 RX ADMIN — PANTOPRAZOLE SODIUM 40 MG: 40 TABLET, DELAYED RELEASE ORAL at 09:01

## 2025-01-05 RX ADMIN — HYDROMORPHONE HYDROCHLORIDE 3 MG: 2 INJECTION INTRAMUSCULAR; INTRAVENOUS; SUBCUTANEOUS at 09:01

## 2025-01-05 RX ADMIN — HYDROMORPHONE HYDROCHLORIDE 3 MG: 2 INJECTION INTRAMUSCULAR; INTRAVENOUS; SUBCUTANEOUS at 03:01

## 2025-01-05 RX ADMIN — HYDROMORPHONE HYDROCHLORIDE 3 MG: 2 INJECTION INTRAMUSCULAR; INTRAVENOUS; SUBCUTANEOUS at 04:01

## 2025-01-05 RX ADMIN — ACETAMINOPHEN 975 MG: 325 TABLET ORAL at 04:01

## 2025-01-05 RX ADMIN — HYDROMORPHONE HYDROCHLORIDE 3 MG: 2 INJECTION INTRAMUSCULAR; INTRAVENOUS; SUBCUTANEOUS at 12:01

## 2025-01-05 RX ADMIN — SUCRALFATE 1 G: 1 TABLET ORAL at 06:01

## 2025-01-05 NOTE — PLAN OF CARE
Problem: Adult Inpatient Plan of Care  Goal: Plan of Care Review  Outcome: Met  Flowsheets (Taken 1/5/2025 1510)  Plan of Care Reviewed With: patient  Goal: Patient-Specific Goal (Individualized)  Outcome: Met  Goal: Absence of Hospital-Acquired Illness or Injury  Outcome: Met  Goal: Optimal Comfort and Wellbeing  Outcome: Met  Goal: Readiness for Transition of Care  Outcome: Met     Problem: Fall Injury Risk  Goal: Absence of Fall and Fall-Related Injury  Outcome: Met  Intervention: Identify and Manage Contributors  Flowsheets (Taken 1/5/2025 0939)  Self-Care Promotion:   independence encouraged   BADL personal objects within reach   BADL personal routines maintained     Problem: Sickle Cell Disease  Goal: Optimal Cerebral Tissue Perfusion  Outcome: Met  Intervention: Protect and Optimize Cerebral Perfusion  Flowsheets (Taken 1/5/2025 0939)  Head of Bed (HOB) Positioning: HOB not elevated per patient request  Goal: Optimal Coping with Sickle Cell Disease  Outcome: Met  Intervention: Support Psychosocial Adjustment  Flowsheets (Taken 1/5/2025 0939)  Supportive Measures: active listening utilized  Goal: Effective Tissue Perfusion  Outcome: Met  Intervention: Maintain Adequate Tissue Perfusion  Flowsheets (Taken 1/5/2025 0939)  Fluid/Electrolyte Management: (labs monitored) other (see comments)  Goal: Absence of Infection Signs and Symptoms  Outcome: Met  Intervention: Prevent or Manage Infection  Flowsheets (Taken 1/5/2025 0939)  Infection Management: aseptic technique maintained  Goal: Optimal Pain Control and Function  Outcome: Met  Intervention: Manage Acute Pain  Flowsheets (Taken 1/5/2025 0939)  Sleep/Rest Enhancement: consistent schedule promoted  Pain Management Interventions: pain management plan reviewed with patient/caregiver  Goal: Optimal Oxygenation  Outcome: Met

## 2025-01-05 NOTE — DISCHARGE SUMMARY
[facility] - Internal Medicine [facility] Medicine  Discharge Summary      Patient Name: Nazanin Malone  MRN: [id number]  VLADIMIR: [id number]  Patient Class: IP- Inpatient  Admission Date: 12/29/2024  Hospital Length of Stay: 6 days  Discharge Date and Time:  01/05/2025 1:31 PM  Attending Physician: Hazel Urena MD   Discharging Provider: Hazel Urena MD  Primary Care Provider: Pee Montgomery MD  Hospital Medicine Team: [facility] A Hazel Urena MD  Primary Care Team: [facility]    HPI:   By Dr. Nathalia Mitchell MD    Nazanin Malone is a 44 y.o. female with sickle cell beta thalassemia zero, multiple pulm emboli on chronic AC, asthma, and HTN who presents for pain in the left lower ribcage area of her chest and left breast as well as her BLE which began 1-2 days ago, feels c/w her prior pain crises, and has been worsening since despite taking her home regimen of tylenol, oxycodone 15mg, and muscle relaxers. She states that she did feel warm at one point and her temp was 100.8 but no higher temps than that. She also reports some ear pain BL but R>L. Reports she has been around someone who had the flu recently. Some nausea but no vomiting. She does have port of her right chest that was last accessed ~12/18 when she was in the hospital for a pain crisis. Denies any pain, redness, swelling, discharge of her port site. Denied cough, sore throat, shortness of breath, wheezing, abd pain, dysuria, headaches, weakness.     She reports that she moved to the area about 6 weeks ago after living in [place]. She was receiving chronic exchange transfusions there but is not currently receiving them as part of her therapy here. She is seeing a hematologist. She also reports that she previously was on MS contin, pen VK, and hydroxurea as part of her home regimen but has not been put back on those since she has moved here.     * No surgery found *      Hospital Course:   Ms. Malone was admitted to Hospital  Medicine for management of a sickle cell pain crisis.  She was started on Dilaudid 3mg IV q3h prn and IVF.  During admission, informed by nursing patient asked if she could be given a little more than what's prescribed (not the full dosage) of her medications, no one has to know and asked if nursing needed some help to throw the items that were in their hand (the empty flush syringe and the empty med syringe). Patient pain improved and labs normalized. Patient was discharged with instructions to follow up with internal medicine and hematology to establish care. Patient was discharged with a short course of pain medication until follow ups are made.      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 disease [D57.00] 04/16/2017 Yes     Chronic    Chronic prescription opiate use [Z79.891] 12/31/2024 Not Applicable    Hx pulmonary embolism [Z86.711] 12/30/2024 Yes    Drug-seeking behavior [Z76.5] 10/22/2024 Yes    Chronic gastritis [K29.50] 08/22/2024 Yes     Chronic    Chronic anticoagulation [Z79.01] 08/16/2024 Not Applicable     Chronic    Intermittent asthma [J45.20] 04/26/2019 Yes    Hypertension [I10] 04/16/2017 Yes     Chronic      Problems Resolved During this Admission:    Diagnosis Date Noted Date Resolved POA    Right ankle pain [M25.571] 01/03/2025 01/04/2025 Yes       Discharged Condition: stable    Disposition:     Follow Up:    Patient Instructions:      Ambulatory referral/consult to Hematology / Oncology   Standing Status: Future   Referral Priority: Routine Referral Type: Consultation   Referral Reason: Specialty Services Required   Requested Specialty: Hematology and Oncology   Number of Visits Requested: 1     Ambulatory referral/consult to Internal Medicine   Standing Status: Future   Referral Priority: Routine Referral Type: Consultation   Referral  Reason: Specialty Services Required   Requested Specialty: Internal Medicine   Number of Visits Requested: 1       Significant Diagnostic Studies: Labs: All labs within the past 24 hours have been reviewed    Pending Diagnostic Studies:       None           Medications:  Reconciled Home Medications:      Medication List        START taking these medications      tiZANidine 2 MG tablet  Commonly known as: ZANAFLEX  Take 2 tablets (4 mg total) by mouth every 8 (eight) hours as needed.            CONTINUE taking these medications      acetaminophen 500 MG tablet  Commonly known as: TYLENOL  Take 1,000 mg by mouth every 8 (eight) hours as needed for Pain.     albuterol 90 mcg/actuation inhaler  Commonly known as: VENTOLIN HFA  Inhale 2 puffs into the lungs every 6 (six) hours as needed for Wheezing or Shortness of Breath. Rescue     amLODIPine 10 MG tablet  Commonly known as: NORVASC  Take 1 tablet (10 mg total) by mouth once daily.     apixaban 5 mg Tab  Commonly known as: ELIQUIS  Take 1 tablet (5 mg total) by mouth 2 (two) times daily.     FLUoxetine 40 MG capsule  Take 1 capsule (40 mg total) by mouth once daily.     fluticasone propionate 50 mcg/actuation nasal spray  Commonly known as: FLONASE  INSTILL 1 SPRAY INTO EACH NOSTRIL EVERY DAY     folic acid 1 MG tablet  Commonly known as: FOLVITE  Take 1 tablet (1 mg total) by mouth once daily.     gabapentin 300 MG capsule  Commonly known as: NEURONTIN  Take 2 capsules (600 mg total) by mouth 3 (three) times daily.     hydroxyurea 500 mg Cap  Commonly known as: HYDREA  Take 2 capsules (1,000 mg total) by mouth once daily.     hydrOXYzine pamoate 25 MG Cap  Commonly known as: VISTARIL  Take 1 capsule (25 mg total) by mouth every 4 (four) hours as needed (Anxiety).     LORazepam 0.5 MG tablet  Commonly known as: ATIVAN  Take 2 tablets (1 mg total) by mouth every 8 (eight) hours as needed for Anxiety.     metoclopramide HCl 5 MG tablet  Commonly known as: REGLAN  Take 1  tablet (5 mg total) by mouth 3 (three) times daily before meals.     oxyCODONE 15 MG Tab  Commonly known as: ROXICODONE  Take 1 tablet (15 mg total) by mouth every 4 (four) hours as needed for Pain.     pantoprazole 40 MG tablet  Commonly known as: PROTONIX  Take 1 tablet (40 mg total) by mouth 2 (two) times daily.     promethazine 12.5 MG Tab  Commonly known as: PHENERGAN  Take 2 tablets (25 mg total) by mouth 4 (four) times daily.     senna-docusate 8.6-50 mg 8.6-50 mg per tablet  Commonly known as: PERICOLACE  Take 1 tablet by mouth daily as needed for Constipation.     sucralfate 1 gram tablet  Commonly known as: CARAFATE  Take 1 tablet (1 g total) by mouth 4 (four) times daily before meals and nightly.     VITAMIN C ORAL  Take 1 capsule by mouth once daily.            STOP taking these medications      methocarbamoL 750 MG Tab  Commonly known as: ROBAXIN              Indwelling Lines/Drains at time of discharge:   Lines/Drains/Airways       Central Venous Catheter Line  Duration                  PowerPort A Cath Single Lumen 12/29/24 0000 Atrial Right 7 days              Drain  Duration             Female External Urinary Catheter w/ Suction 12/29/24 1929 6 days                    Time spent on the discharge of patient: 35 minutes         Hazel Urena MD  Department of Hospital Medicine  [facility] - Internal Medicine Telemetry

## 2025-01-05 NOTE — NURSING
Patient discharged today. Awake, alert, and oriented with no acute distress noted. Reviewed discharge orders including: medicine orders, prescriptions, follow-up appointments, and patient education materials for diet and diagnosis. Belongings packed for transport to home. Ambulating out per wheelchair at time of discharge.

## 2025-01-05 NOTE — PLAN OF CARE
[facility] - Internal Medicine Telemetry  Discharge Final Note    Primary Care Provider: Pee Montgomery MD    Expected Discharge Date: 1/5/2025    Final Discharge Note (most recent)       Final Note - 01/05/25 1623          Final Note    Assessment Type Final Discharge Note (P)      Anticipated Discharge Disposition Home or Self Care (P)      What phone number can be called within the next 1-3 days to see how you are doing after discharge? [phone] (P)         Post-Acute Status    Discharge Delays None known at this time (P)                      Important Message from Medicare         No future appointments.    Patient discharged with family, no discharge needs at the moment per medical team.      Discharge Plan A and Plan B have been determined by review of patient's clinical status, future medical and therapeutic needs, and coverage/benefits for post-acute care in coordination with multidisciplinary team members.    Nina Diop MSW, LCSW  [facility]  Case Management Dept.

## 2025-01-07 ENCOUNTER — HOSPITAL ENCOUNTER (INPATIENT)
Facility: HOSPITAL | Age: 45
LOS: 17 days | Discharge: HOME | DRG: 812 | End: 2025-01-25
Attending: EMERGENCY MEDICINE | Admitting: INTERNAL MEDICINE
Payer: MEDICARE

## 2025-01-07 ENCOUNTER — TELEPHONE (OUTPATIENT)
Dept: HEMATOLOGY/ONCOLOGY | Facility: CLINIC | Age: 45
End: 2025-01-07
Payer: MEDICARE

## 2025-01-07 DIAGNOSIS — R07.9 CHEST PAIN: ICD-10-CM

## 2025-01-07 DIAGNOSIS — D57.00 SICKLE CELL ANEMIA WITH PAIN: Primary | ICD-10-CM

## 2025-01-07 DIAGNOSIS — D57.00 SICKLE CELL PAIN CRISIS: ICD-10-CM

## 2025-01-07 DIAGNOSIS — R78.81 BACTEREMIA: ICD-10-CM

## 2025-01-07 LAB
ALBUMIN SERPL BCP-MCNC: 4 G/DL (ref 3.5–5.2)
ALP SERPL-CCNC: 90 U/L (ref 40–150)
ALT SERPL W/O P-5'-P-CCNC: 10 U/L (ref 10–44)
ANION GAP SERPL CALC-SCNC: 12 MMOL/L (ref 8–16)
AST SERPL-CCNC: 17 U/L (ref 10–40)
BASOPHILS # BLD AUTO: 0.03 K/UL (ref 0–0.2)
BASOPHILS NFR BLD: 0.5 % (ref 0–1.9)
BILIRUB SERPL-MCNC: 0.5 MG/DL (ref 0.1–1)
BUN SERPL-MCNC: 8 MG/DL (ref 6–20)
CALCIUM SERPL-MCNC: 9.7 MG/DL (ref 8.7–10.5)
CHLORIDE SERPL-SCNC: 109 MMOL/L (ref 95–110)
CO2 SERPL-SCNC: 20 MMOL/L (ref 23–29)
CREAT SERPL-MCNC: 1.1 MG/DL (ref 0.5–1.4)
DIFFERENTIAL METHOD BLD: ABNORMAL
EOSINOPHIL # BLD AUTO: 0 K/UL (ref 0–0.5)
EOSINOPHIL NFR BLD: 0.3 % (ref 0–8)
ERYTHROCYTE [DISTWIDTH] IN BLOOD BY AUTOMATED COUNT: 21.2 % (ref 11.5–14.5)
EST. GFR  (NO RACE VARIABLE): >60 ML/MIN/1.73 M^2
GLUCOSE SERPL-MCNC: 93 MG/DL (ref 70–110)
HCT VFR BLD AUTO: 27.7 % (ref 37–48.5)
HGB BLD-MCNC: 9.8 G/DL (ref 12–16)
IMM GRANULOCYTES # BLD AUTO: 0.02 K/UL (ref 0–0.04)
IMM GRANULOCYTES NFR BLD AUTO: 0.3 % (ref 0–0.5)
INFLUENZA A, MOLECULAR: NOT DETECTED
INFLUENZA B, MOLECULAR: NOT DETECTED
LACTATE SERPL-SCNC: 0.8 MMOL/L (ref 0.5–2.2)
LYMPHOCYTES # BLD AUTO: 2.6 K/UL (ref 1–4.8)
LYMPHOCYTES NFR BLD: 44.1 % (ref 18–48)
MAGNESIUM SERPL-MCNC: 1.8 MG/DL (ref 1.6–2.6)
MCH RBC QN AUTO: 22.3 PG (ref 27–31)
MCHC RBC AUTO-ENTMCNC: 35.4 G/DL (ref 32–36)
MCV RBC AUTO: 63 FL (ref 82–98)
MONOCYTES # BLD AUTO: 0.5 K/UL (ref 0.3–1)
MONOCYTES NFR BLD: 7.9 % (ref 4–15)
NEUTROPHILS # BLD AUTO: 2.8 K/UL (ref 1.8–7.7)
NEUTROPHILS NFR BLD: 46.9 % (ref 38–73)
NRBC BLD-RTO: 1 /100 WBC
PLATELET # BLD AUTO: 481 K/UL (ref 150–450)
PMV BLD AUTO: 9.8 FL (ref 9.2–12.9)
POTASSIUM SERPL-SCNC: 2.9 MMOL/L (ref 3.5–5.1)
PROT SERPL-MCNC: 8.6 G/DL (ref 6–8.4)
RBC # BLD AUTO: 4.39 M/UL (ref 4–5.4)
RETICS/RBC NFR AUTO: 1.2 % (ref 0.5–2.5)
RSV AG BY MOLECULAR METHOD: NOT DETECTED
SARS-COV-2 RNA RESP QL NAA+PROBE: NOT DETECTED
SODIUM SERPL-SCNC: 141 MMOL/L (ref 136–145)
WBC # BLD AUTO: 5.98 K/UL (ref 3.9–12.7)

## 2025-01-07 PROCEDURE — 83735 ASSAY OF MAGNESIUM: CPT | Performed by: EMERGENCY MEDICINE

## 2025-01-07 PROCEDURE — 94761 N-INVAS EAR/PLS OXIMETRY MLT: CPT

## 2025-01-07 PROCEDURE — 96375 TX/PRO/DX INJ NEW DRUG ADDON: CPT

## 2025-01-07 PROCEDURE — 25000003 PHARM REV CODE 250

## 2025-01-07 PROCEDURE — 87077 CULTURE AEROBIC IDENTIFY: CPT | Performed by: EMERGENCY MEDICINE

## 2025-01-07 PROCEDURE — 0241U SARS-COV2 (COVID) WITH FLU/RSV BY PCR: CPT | Performed by: EMERGENCY MEDICINE

## 2025-01-07 PROCEDURE — 63600175 PHARM REV CODE 636 W HCPCS: Mod: TB | Performed by: EMERGENCY MEDICINE

## 2025-01-07 PROCEDURE — G0378 HOSPITAL OBSERVATION PER HR: HCPCS

## 2025-01-07 PROCEDURE — 80053 COMPREHEN METABOLIC PANEL: CPT | Performed by: EMERGENCY MEDICINE

## 2025-01-07 PROCEDURE — 85025 COMPLETE CBC W/AUTO DIFF WBC: CPT | Performed by: EMERGENCY MEDICINE

## 2025-01-07 PROCEDURE — 87186 SC STD MICRODIL/AGAR DIL: CPT | Performed by: EMERGENCY MEDICINE

## 2025-01-07 PROCEDURE — 83605 ASSAY OF LACTIC ACID: CPT | Performed by: EMERGENCY MEDICINE

## 2025-01-07 PROCEDURE — 99285 EMERGENCY DEPT VISIT HI MDM: CPT | Mod: 25

## 2025-01-07 PROCEDURE — 85045 AUTOMATED RETICULOCYTE COUNT: CPT | Performed by: EMERGENCY MEDICINE

## 2025-01-07 PROCEDURE — 87040 BLOOD CULTURE FOR BACTERIA: CPT | Mod: 59 | Performed by: EMERGENCY MEDICINE

## 2025-01-07 PROCEDURE — 96376 TX/PRO/DX INJ SAME DRUG ADON: CPT

## 2025-01-07 PROCEDURE — 87150 DNA/RNA AMPLIFIED PROBE: CPT | Performed by: EMERGENCY MEDICINE

## 2025-01-07 PROCEDURE — 25000003 PHARM REV CODE 250: Performed by: EMERGENCY MEDICINE

## 2025-01-07 RX ORDER — IBUPROFEN 200 MG
24 TABLET ORAL
Status: DISCONTINUED | OUTPATIENT
Start: 2025-01-07 | End: 2025-01-25 | Stop reason: HOSPADM

## 2025-01-07 RX ORDER — PANTOPRAZOLE SODIUM 40 MG/1
40 TABLET, DELAYED RELEASE ORAL 2 TIMES DAILY
Status: DISCONTINUED | OUTPATIENT
Start: 2025-01-07 | End: 2025-01-25 | Stop reason: HOSPADM

## 2025-01-07 RX ORDER — PROCHLORPERAZINE MALEATE 5 MG
10 TABLET ORAL EVERY 6 HOURS PRN
Status: DISCONTINUED | OUTPATIENT
Start: 2025-01-07 | End: 2025-01-25 | Stop reason: HOSPADM

## 2025-01-07 RX ORDER — CALCIUM CARBONATE 200(500)MG
1000 TABLET,CHEWABLE ORAL 3 TIMES DAILY PRN
Status: DISCONTINUED | OUTPATIENT
Start: 2025-01-07 | End: 2025-01-25 | Stop reason: HOSPADM

## 2025-01-07 RX ORDER — ACETAMINOPHEN 500 MG
1000 TABLET ORAL
Status: COMPLETED | OUTPATIENT
Start: 2025-01-07 | End: 2025-01-07

## 2025-01-07 RX ORDER — SODIUM CHLORIDE 0.9 % (FLUSH) 0.9 %
10 SYRINGE (ML) INJECTION EVERY 12 HOURS PRN
Status: DISCONTINUED | OUTPATIENT
Start: 2025-01-07 | End: 2025-01-07

## 2025-01-07 RX ORDER — METHOCARBAMOL 750 MG/1
750 TABLET, FILM COATED ORAL 3 TIMES DAILY
Status: DISCONTINUED | OUTPATIENT
Start: 2025-01-08 | End: 2025-01-11

## 2025-01-07 RX ORDER — ONDANSETRON 8 MG/1
8 TABLET, ORALLY DISINTEGRATING ORAL EVERY 8 HOURS PRN
Status: DISCONTINUED | OUTPATIENT
Start: 2025-01-08 | End: 2025-01-08

## 2025-01-07 RX ORDER — HYDROMORPHONE HYDROCHLORIDE 1 MG/ML
1 INJECTION, SOLUTION INTRAMUSCULAR; INTRAVENOUS; SUBCUTANEOUS
Status: COMPLETED | OUTPATIENT
Start: 2025-01-07 | End: 2025-01-07

## 2025-01-07 RX ORDER — DIPHENHYDRAMINE HCL 50 MG
50 CAPSULE ORAL EVERY 6 HOURS PRN
Status: DISCONTINUED | OUTPATIENT
Start: 2025-01-07 | End: 2025-01-07

## 2025-01-07 RX ORDER — AMLODIPINE BESYLATE 10 MG/1
10 TABLET ORAL DAILY
Status: DISCONTINUED | OUTPATIENT
Start: 2025-01-08 | End: 2025-01-25 | Stop reason: HOSPADM

## 2025-01-07 RX ORDER — DIPHENHYDRAMINE HCL 25 MG
25 CAPSULE ORAL
Status: COMPLETED | OUTPATIENT
Start: 2025-01-07 | End: 2025-01-07

## 2025-01-07 RX ORDER — TALC
6 POWDER (GRAM) TOPICAL NIGHTLY PRN
Status: DISCONTINUED | OUTPATIENT
Start: 2025-01-07 | End: 2025-01-07

## 2025-01-07 RX ORDER — NALOXONE HCL 0.4 MG/ML
0.2 VIAL (ML) INJECTION
Status: DISCONTINUED | OUTPATIENT
Start: 2025-01-07 | End: 2025-01-25 | Stop reason: HOSPADM

## 2025-01-07 RX ORDER — GABAPENTIN 300 MG/1
600 CAPSULE ORAL 3 TIMES DAILY
Status: DISCONTINUED | OUTPATIENT
Start: 2025-01-08 | End: 2025-01-25 | Stop reason: HOSPADM

## 2025-01-07 RX ORDER — FOLIC ACID 1 MG/1
1 TABLET ORAL DAILY
Status: DISCONTINUED | OUTPATIENT
Start: 2025-01-08 | End: 2025-01-25 | Stop reason: HOSPADM

## 2025-01-07 RX ORDER — SODIUM CHLORIDE 0.9 % (FLUSH) 0.9 %
10 SYRINGE (ML) INJECTION
Status: DISCONTINUED | OUTPATIENT
Start: 2025-01-07 | End: 2025-01-25 | Stop reason: HOSPADM

## 2025-01-07 RX ORDER — POTASSIUM CHLORIDE 7.45 MG/ML
10 INJECTION INTRAVENOUS
Status: COMPLETED | OUTPATIENT
Start: 2025-01-08 | End: 2025-01-08

## 2025-01-07 RX ORDER — POLYETHYLENE GLYCOL 3350 17 G/17G
17 POWDER, FOR SOLUTION ORAL DAILY
Status: DISCONTINUED | OUTPATIENT
Start: 2025-01-08 | End: 2025-01-25 | Stop reason: HOSPADM

## 2025-01-07 RX ORDER — AMOXICILLIN 250 MG
1 CAPSULE ORAL 2 TIMES DAILY
Status: DISCONTINUED | OUTPATIENT
Start: 2025-01-07 | End: 2025-01-25 | Stop reason: HOSPADM

## 2025-01-07 RX ORDER — GLUCAGON 1 MG
1 KIT INJECTION
Status: DISCONTINUED | OUTPATIENT
Start: 2025-01-07 | End: 2025-01-25 | Stop reason: HOSPADM

## 2025-01-07 RX ORDER — ALBUTEROL SULFATE 90 UG/1
2 INHALANT RESPIRATORY (INHALATION) EVERY 6 HOURS PRN
Status: DISCONTINUED | OUTPATIENT
Start: 2025-01-07 | End: 2025-01-25 | Stop reason: HOSPADM

## 2025-01-07 RX ORDER — DIPHENHYDRAMINE HYDROCHLORIDE 50 MG/ML
12.5 INJECTION INTRAMUSCULAR; INTRAVENOUS EVERY 6 HOURS PRN
Status: DISCONTINUED | OUTPATIENT
Start: 2025-01-07 | End: 2025-01-07

## 2025-01-07 RX ORDER — ONDANSETRON HYDROCHLORIDE 2 MG/ML
4 INJECTION, SOLUTION INTRAVENOUS
Status: COMPLETED | OUTPATIENT
Start: 2025-01-07 | End: 2025-01-07

## 2025-01-07 RX ORDER — HYDROXYZINE HYDROCHLORIDE 25 MG/1
50 TABLET, FILM COATED ORAL EVERY 4 HOURS PRN
Status: DISCONTINUED | OUTPATIENT
Start: 2025-01-07 | End: 2025-01-07

## 2025-01-07 RX ORDER — TALC
6 POWDER (GRAM) TOPICAL NIGHTLY PRN
Status: DISCONTINUED | OUTPATIENT
Start: 2025-01-07 | End: 2025-01-25 | Stop reason: HOSPADM

## 2025-01-07 RX ORDER — SUCRALFATE 1 G/1
1 TABLET ORAL
Status: DISCONTINUED | OUTPATIENT
Start: 2025-01-08 | End: 2025-01-25 | Stop reason: HOSPADM

## 2025-01-07 RX ORDER — IBUPROFEN 200 MG
16 TABLET ORAL
Status: DISCONTINUED | OUTPATIENT
Start: 2025-01-07 | End: 2025-01-25 | Stop reason: HOSPADM

## 2025-01-07 RX ORDER — HYDROMORPHONE HYDROCHLORIDE 1 MG/ML
3 INJECTION, SOLUTION INTRAMUSCULAR; INTRAVENOUS; SUBCUTANEOUS
Status: DISCONTINUED | OUTPATIENT
Start: 2025-01-07 | End: 2025-01-09

## 2025-01-07 RX ORDER — DIPHENHYDRAMINE HCL 50 MG
50 CAPSULE ORAL EVERY 6 HOURS PRN
Status: DISCONTINUED | OUTPATIENT
Start: 2025-01-08 | End: 2025-01-08

## 2025-01-07 RX ORDER — HYDROXYUREA 500 MG/1
1000 CAPSULE ORAL DAILY
Status: DISCONTINUED | OUTPATIENT
Start: 2025-01-08 | End: 2025-01-25 | Stop reason: HOSPADM

## 2025-01-07 RX ORDER — ACETAMINOPHEN 325 MG/1
975 TABLET ORAL 3 TIMES DAILY
Status: DISCONTINUED | OUTPATIENT
Start: 2025-01-08 | End: 2025-01-25 | Stop reason: HOSPADM

## 2025-01-07 RX ADMIN — HYDROMORPHONE HYDROCHLORIDE 1 MG: 1 INJECTION, SOLUTION INTRAMUSCULAR; INTRAVENOUS; SUBCUTANEOUS at 09:01

## 2025-01-07 RX ADMIN — PANTOPRAZOLE SODIUM 40 MG: 40 TABLET, DELAYED RELEASE ORAL at 11:01

## 2025-01-07 RX ADMIN — DIPHENHYDRAMINE HYDROCHLORIDE 25 MG: 25 CAPSULE ORAL at 08:01

## 2025-01-07 RX ADMIN — POTASSIUM BICARBONATE 25 MEQ: 978 TABLET, EFFERVESCENT ORAL at 09:01

## 2025-01-07 RX ADMIN — ONDANSETRON 4 MG: 2 INJECTION INTRAMUSCULAR; INTRAVENOUS at 08:01

## 2025-01-07 RX ADMIN — HYDROMORPHONE HYDROCHLORIDE 1 MG: 1 INJECTION, SOLUTION INTRAMUSCULAR; INTRAVENOUS; SUBCUTANEOUS at 10:01

## 2025-01-07 RX ADMIN — APIXABAN 5 MG: 5 TABLET, FILM COATED ORAL at 11:01

## 2025-01-07 RX ADMIN — HYDROMORPHONE HYDROCHLORIDE 1 MG: 1 INJECTION, SOLUTION INTRAMUSCULAR; INTRAVENOUS; SUBCUTANEOUS at 08:01

## 2025-01-07 RX ADMIN — POTASSIUM BICARBONATE 40 MEQ: 391 TABLET, EFFERVESCENT ORAL at 11:01

## 2025-01-07 RX ADMIN — ACETAMINOPHEN 1000 MG: 500 TABLET ORAL at 08:01

## 2025-01-07 NOTE — TELEPHONE ENCOUNTER
"Pt reports 10/10 pain, vomiting eight times today, chills, and "it feels like I might have a fever, I don't have a thermometer". Advised pt to go to the ER due to symptoms reported. Pt verbalized understanding and agreeable to going to the ER.  "

## 2025-01-07 NOTE — TELEPHONE ENCOUNTER
"----- Message from Brittany sent at 1/7/2025  3:12 PM CST -----  Regarding: Consult/Advisory          Name Of Caller: Self     Contact Preference?:  [phone]      Provider Name:    Eduardo     Does patient feel the need to be seen today? No     What is the nature of the call?:   Pt requesting to speak with nurse about their current symptoms. She's experiencing frequent vomiting and pain she rates at 10 out of 10.  Pt would like to come in soon for an appt.         Additional Notes:  "Thank you for all that you do for our patients  "

## 2025-01-07 NOTE — Clinical Note
Diagnosis: Sickle cell anemia with pain [645620]   Future Attending Provider: VIDAL HAYNES [[id number]]

## 2025-01-08 ENCOUNTER — TELEPHONE (OUTPATIENT)
Dept: HEMATOLOGY/ONCOLOGY | Facility: CLINIC | Age: 45
End: 2025-01-08
Payer: MEDICARE

## 2025-01-08 PROBLEM — R50.9 FEVER: Status: ACTIVE | Noted: 2025-01-08

## 2025-01-08 PROBLEM — J45.20 INTERMITTENT ASTHMA: Chronic | Status: ACTIVE | Noted: 2019-04-26

## 2025-01-08 PROBLEM — Z79.891 CHRONIC PRESCRIPTION OPIATE USE: Chronic | Status: ACTIVE | Noted: 2024-12-31

## 2025-01-08 LAB
ALBUMIN SERPL BCP-MCNC: 3.6 G/DL (ref 3.5–5.2)
ALP SERPL-CCNC: 81 U/L (ref 40–150)
ALT SERPL W/O P-5'-P-CCNC: 10 U/L (ref 10–44)
ANION GAP SERPL CALC-SCNC: 9 MMOL/L (ref 8–16)
AST SERPL-CCNC: 23 U/L (ref 10–40)
BACTERIA #/AREA URNS AUTO: ABNORMAL /HPF
BASOPHILS # BLD AUTO: 0.09 K/UL (ref 0–0.2)
BASOPHILS NFR BLD: 1 % (ref 0–1.9)
BILIRUB SERPL-MCNC: 0.5 MG/DL (ref 0.1–1)
BILIRUB UR QL STRIP: NEGATIVE
BUN SERPL-MCNC: 8 MG/DL (ref 6–20)
CALCIUM SERPL-MCNC: 9.4 MG/DL (ref 8.7–10.5)
CHLORIDE SERPL-SCNC: 107 MMOL/L (ref 95–110)
CLARITY UR REFRACT.AUTO: CLEAR
CO2 SERPL-SCNC: 24 MMOL/L (ref 23–29)
COLOR UR AUTO: YELLOW
CREAT SERPL-MCNC: 1.1 MG/DL (ref 0.5–1.4)
DIFFERENTIAL METHOD BLD: ABNORMAL
EOSINOPHIL # BLD AUTO: 0.1 K/UL (ref 0–0.5)
EOSINOPHIL NFR BLD: 1.6 % (ref 0–8)
ERYTHROCYTE [DISTWIDTH] IN BLOOD BY AUTOMATED COUNT: 22.1 % (ref 11.5–14.5)
EST. GFR  (NO RACE VARIABLE): >60 ML/MIN/1.73 M^2
GLUCOSE SERPL-MCNC: 109 MG/DL (ref 70–110)
GLUCOSE UR QL STRIP: NEGATIVE
HCT VFR BLD AUTO: 25.7 % (ref 37–48.5)
HGB BLD-MCNC: 8.7 G/DL (ref 12–16)
HGB UR QL STRIP: NEGATIVE
IMM GRANULOCYTES # BLD AUTO: 0.03 K/UL (ref 0–0.04)
IMM GRANULOCYTES NFR BLD AUTO: 0.3 % (ref 0–0.5)
KETONES UR QL STRIP: NEGATIVE
LEUKOCYTE ESTERASE UR QL STRIP: ABNORMAL
LYMPHOCYTES # BLD AUTO: 4.2 K/UL (ref 1–4.8)
LYMPHOCYTES NFR BLD: 47.6 % (ref 18–48)
MAGNESIUM SERPL-MCNC: 1.7 MG/DL (ref 1.6–2.6)
MCH RBC QN AUTO: 21.5 PG (ref 27–31)
MCHC RBC AUTO-ENTMCNC: 33.9 G/DL (ref 32–36)
MCV RBC AUTO: 64 FL (ref 82–98)
MICROSCOPIC COMMENT: ABNORMAL
MONOCYTES # BLD AUTO: 0.9 K/UL (ref 0.3–1)
MONOCYTES NFR BLD: 9.7 % (ref 4–15)
MRSA ID BY PCR: NEGATIVE
NEUTROPHILS # BLD AUTO: 3.5 K/UL (ref 1.8–7.7)
NEUTROPHILS NFR BLD: 39.8 % (ref 38–73)
NITRITE UR QL STRIP: NEGATIVE
NRBC BLD-RTO: 1 /100 WBC
PH UR STRIP: 6 [PH] (ref 5–8)
PHOSPHATE SERPL-MCNC: 3.1 MG/DL (ref 2.7–4.5)
PLATELET # BLD AUTO: 363 K/UL (ref 150–450)
PMV BLD AUTO: 9.8 FL (ref 9.2–12.9)
POTASSIUM SERPL-SCNC: 3.6 MMOL/L (ref 3.5–5.1)
PROT SERPL-MCNC: 8 G/DL (ref 6–8.4)
PROT UR QL STRIP: ABNORMAL
RBC # BLD AUTO: 4.05 M/UL (ref 4–5.4)
RBC #/AREA URNS AUTO: 1 /HPF (ref 0–4)
SODIUM SERPL-SCNC: 140 MMOL/L (ref 136–145)
SP GR UR STRIP: 1.01 (ref 1–1.03)
SQUAMOUS #/AREA URNS AUTO: 4 /HPF
STAPH AUREUS ID BY PCR: NEGATIVE
URN SPEC COLLECT METH UR: ABNORMAL
WBC # BLD AUTO: 8.74 K/UL (ref 3.9–12.7)
WBC #/AREA URNS AUTO: 4 /HPF (ref 0–5)

## 2025-01-08 PROCEDURE — 96376 TX/PRO/DX INJ SAME DRUG ADON: CPT

## 2025-01-08 PROCEDURE — 63600175 PHARM REV CODE 636 W HCPCS

## 2025-01-08 PROCEDURE — 80053 COMPREHEN METABOLIC PANEL: CPT

## 2025-01-08 PROCEDURE — 81001 URINALYSIS AUTO W/SCOPE: CPT | Performed by: EMERGENCY MEDICINE

## 2025-01-08 PROCEDURE — 25000003 PHARM REV CODE 250

## 2025-01-08 PROCEDURE — 96375 TX/PRO/DX INJ NEW DRUG ADDON: CPT

## 2025-01-08 PROCEDURE — 63600175 PHARM REV CODE 636 W HCPCS: Mod: TB

## 2025-01-08 PROCEDURE — 85025 COMPLETE CBC W/AUTO DIFF WBC: CPT

## 2025-01-08 PROCEDURE — 96365 THER/PROPH/DIAG IV INF INIT: CPT

## 2025-01-08 PROCEDURE — 63600175 PHARM REV CODE 636 W HCPCS: Performed by: INTERNAL MEDICINE

## 2025-01-08 PROCEDURE — 11000001 HC ACUTE MED/SURG PRIVATE ROOM

## 2025-01-08 PROCEDURE — 84100 ASSAY OF PHOSPHORUS: CPT

## 2025-01-08 PROCEDURE — 25000003 PHARM REV CODE 250: Performed by: INTERNAL MEDICINE

## 2025-01-08 PROCEDURE — 25000003 PHARM REV CODE 250: Performed by: EMERGENCY MEDICINE

## 2025-01-08 PROCEDURE — 83735 ASSAY OF MAGNESIUM: CPT

## 2025-01-08 PROCEDURE — 96366 THER/PROPH/DIAG IV INF ADDON: CPT

## 2025-01-08 PROCEDURE — 96361 HYDRATE IV INFUSION ADD-ON: CPT

## 2025-01-08 RX ORDER — CEFTRIAXONE 2 G/1
2 INJECTION, POWDER, FOR SOLUTION INTRAMUSCULAR; INTRAVENOUS
Status: DISCONTINUED | OUTPATIENT
Start: 2025-01-09 | End: 2025-01-10

## 2025-01-08 RX ORDER — DIPHENHYDRAMINE HYDROCHLORIDE 50 MG/ML
12.5 INJECTION INTRAMUSCULAR; INTRAVENOUS ONCE
Status: COMPLETED | OUTPATIENT
Start: 2025-01-08 | End: 2025-01-08

## 2025-01-08 RX ORDER — ONDANSETRON 8 MG/1
8 TABLET, ORALLY DISINTEGRATING ORAL EVERY 8 HOURS PRN
Status: DISCONTINUED | OUTPATIENT
Start: 2025-01-08 | End: 2025-01-25 | Stop reason: HOSPADM

## 2025-01-08 RX ORDER — SODIUM CHLORIDE 9 MG/ML
INJECTION, SOLUTION INTRAVENOUS CONTINUOUS
Status: ACTIVE | OUTPATIENT
Start: 2025-01-08 | End: 2025-01-08

## 2025-01-08 RX ORDER — DIPHENHYDRAMINE HYDROCHLORIDE 50 MG/ML
INJECTION INTRAMUSCULAR; INTRAVENOUS
Status: COMPLETED
Start: 2025-01-08 | End: 2025-01-08

## 2025-01-08 RX ORDER — CEFTRIAXONE 2 G/1
2 INJECTION, POWDER, FOR SOLUTION INTRAMUSCULAR; INTRAVENOUS
Status: DISCONTINUED | OUTPATIENT
Start: 2025-01-08 | End: 2025-01-08

## 2025-01-08 RX ORDER — VANCOMYCIN 2 GRAM/500 ML IN 0.9 % SODIUM CHLORIDE INTRAVENOUS
2000 ONCE
Status: COMPLETED | OUTPATIENT
Start: 2025-01-08 | End: 2025-01-09

## 2025-01-08 RX ORDER — DIPHENHYDRAMINE HYDROCHLORIDE 50 MG/ML
25 INJECTION INTRAMUSCULAR; INTRAVENOUS EVERY 4 HOURS PRN
Status: DISCONTINUED | OUTPATIENT
Start: 2025-01-08 | End: 2025-01-17

## 2025-01-08 RX ADMIN — AMLODIPINE BESYLATE 10 MG: 10 TABLET ORAL at 08:01

## 2025-01-08 RX ADMIN — SODIUM CHLORIDE: 9 INJECTION, SOLUTION INTRAVENOUS at 02:01

## 2025-01-08 RX ADMIN — POTASSIUM CHLORIDE 10 MEQ: 7.46 INJECTION, SOLUTION INTRAVENOUS at 12:01

## 2025-01-08 RX ADMIN — DIPHENHYDRAMINE HYDROCHLORIDE 12.5 MG: 50 INJECTION, SOLUTION INTRAMUSCULAR; INTRAVENOUS at 05:01

## 2025-01-08 RX ADMIN — HYDROMORPHONE HYDROCHLORIDE 3 MG: 1 INJECTION, SOLUTION INTRAMUSCULAR; INTRAVENOUS; SUBCUTANEOUS at 09:01

## 2025-01-08 RX ADMIN — METHOCARBAMOL 750 MG: 750 TABLET ORAL at 03:01

## 2025-01-08 RX ADMIN — OXYCODONE 15 MG: 5 TABLET ORAL at 08:01

## 2025-01-08 RX ADMIN — HYDROMORPHONE HYDROCHLORIDE 3 MG: 1 INJECTION, SOLUTION INTRAMUSCULAR; INTRAVENOUS; SUBCUTANEOUS at 03:01

## 2025-01-08 RX ADMIN — DIPHENHYDRAMINE HYDROCHLORIDE 25 MG: 50 INJECTION, SOLUTION INTRAMUSCULAR; INTRAVENOUS at 03:01

## 2025-01-08 RX ADMIN — POTASSIUM CHLORIDE 10 MEQ: 7.46 INJECTION, SOLUTION INTRAVENOUS at 03:01

## 2025-01-08 RX ADMIN — HYDROMORPHONE HYDROCHLORIDE 3 MG: 1 INJECTION, SOLUTION INTRAMUSCULAR; INTRAVENOUS; SUBCUTANEOUS at 05:01

## 2025-01-08 RX ADMIN — POTASSIUM CHLORIDE 10 MEQ: 7.46 INJECTION, SOLUTION INTRAVENOUS at 04:01

## 2025-01-08 RX ADMIN — CEFTRIAXONE 2 G: 2 INJECTION, POWDER, FOR SOLUTION INTRAMUSCULAR; INTRAVENOUS at 12:01

## 2025-01-08 RX ADMIN — PROCHLORPERAZINE MALEATE 10 MG: 5 TABLET ORAL at 06:01

## 2025-01-08 RX ADMIN — HYDROMORPHONE HYDROCHLORIDE 3 MG: 1 INJECTION, SOLUTION INTRAMUSCULAR; INTRAVENOUS; SUBCUTANEOUS at 12:01

## 2025-01-08 RX ADMIN — POTASSIUM CHLORIDE 10 MEQ: 7.46 INJECTION, SOLUTION INTRAVENOUS at 02:01

## 2025-01-08 RX ADMIN — HYDROMORPHONE HYDROCHLORIDE 3 MG: 1 INJECTION, SOLUTION INTRAMUSCULAR; INTRAVENOUS; SUBCUTANEOUS at 06:01

## 2025-01-08 RX ADMIN — PANTOPRAZOLE SODIUM 40 MG: 40 TABLET, DELAYED RELEASE ORAL at 08:01

## 2025-01-08 RX ADMIN — DIPHENHYDRAMINE HYDROCHLORIDE 25 MG: 50 INJECTION, SOLUTION INTRAMUSCULAR; INTRAVENOUS at 11:01

## 2025-01-08 RX ADMIN — SUCRALFATE 1 G: 1 TABLET ORAL at 08:01

## 2025-01-08 RX ADMIN — VANCOMYCIN HYDROCHLORIDE 2000 MG: 500 INJECTION, POWDER, LYOPHILIZED, FOR SOLUTION INTRAVENOUS at 11:01

## 2025-01-08 RX ADMIN — GABAPENTIN 600 MG: 300 CAPSULE ORAL at 03:01

## 2025-01-08 RX ADMIN — METHOCARBAMOL 750 MG: 750 TABLET ORAL at 08:01

## 2025-01-08 RX ADMIN — Medication 6 MG: at 08:01

## 2025-01-08 RX ADMIN — SUCRALFATE 1 G: 1 TABLET ORAL at 11:01

## 2025-01-08 RX ADMIN — APIXABAN 5 MG: 5 TABLET, FILM COATED ORAL at 08:01

## 2025-01-08 RX ADMIN — SUCRALFATE 1 G: 1 TABLET ORAL at 04:01

## 2025-01-08 RX ADMIN — SUCRALFATE 1 G: 1 TABLET ORAL at 06:01

## 2025-01-08 RX ADMIN — ACETAMINOPHEN 975 MG: 325 TABLET ORAL at 03:01

## 2025-01-08 RX ADMIN — ACETAMINOPHEN 975 MG: 325 TABLET ORAL at 08:01

## 2025-01-08 RX ADMIN — ONDANSETRON 8 MG: 8 TABLET, ORALLY DISINTEGRATING ORAL at 12:01

## 2025-01-08 RX ADMIN — FOLIC ACID 1 MG: 1 TABLET ORAL at 08:01

## 2025-01-08 RX ADMIN — DIPHENHYDRAMINE HYDROCHLORIDE 25 MG: 50 INJECTION, SOLUTION INTRAMUSCULAR; INTRAVENOUS at 08:01

## 2025-01-08 RX ADMIN — HYDROXYUREA 1000 MG: 500 CAPSULE ORAL at 08:01

## 2025-01-08 RX ADMIN — DIPHENHYDRAMINE HYDROCHLORIDE 50 MG: 50 CAPSULE ORAL at 12:01

## 2025-01-08 RX ADMIN — GABAPENTIN 600 MG: 300 CAPSULE ORAL at 08:01

## 2025-01-08 NOTE — PLAN OF CARE
[facility] - Emergency Dept  Initial Discharge Assessment       Primary Care Provider: Pee Montgomery MD    Admission Diagnosis: Sickle cell anemia with pain [D57.00]    Admission Date: 1/7/2025  Expected Discharge Date:   Pt is independent with their ADL's and ambulation, does not require assistance.    Post acute was discussed with pt. Pt d/c home with no needs at the moment.        Payor: [employer] MANAGED MEDICARE / Plan: [employer]ET[employer] MEDICARE PLAN HMO / Product Type: Medicare Advantage /     Extended Emergency Contact Information  Primary Emergency Contact: Jack Malone  Mobile Phone: [phone]  Relation: Brother    Discharge Plan A: (P) Home, Home with family  Discharge Plan B: (P) Home, Home with family      [facility]  [address] [facility] [address]  Phone: [phone] Fax: [phone]    [facility] [place]  [address] [facility] [address]  [facility] [address]  Phone: [phone] Fax: [phone]      Initial Assessment (most recent)       Adult Discharge Assessment - 01/08/25 1026          Discharge Assessment    Assessment Type Discharge Planning Assessment (P)      Confirmed/corrected address, phone number and insurance Yes (P)      Confirmed Demographics Correct on Facesheet (P)      Source of Information patient (P)      Does patient/caregiver understand observation status Yes (P)      Reason For Admission Fever (P)      People in Home parent(s);child(jayce), adult;grandchild(jayce) (P)      Do you expect to return to your current living situation? Yes (P)      Do you have help at home or someone to help you manage your care at home? No (P)      Prior to hospitilization cognitive status: Alert/Oriented;No Deficits (P)      Current cognitive status: No Deficits;Alert/Oriented (P)      Walking or Climbing Stairs Difficulty no (P)      Dressing/Bathing Difficulty no (P)      Home Accessibility stairs to enter home (P)      Number of Stairs, Main Entrance two (P)      Home Layout  Able to live on 1st floor (P)      Equipment Currently Used at Home none (P)      Readmission within 30 days? Yes (P)      Patient currently being followed by outpatient case management? No (P)      Do you currently have service(s) that help you manage your care at home? No (P)      Do you take prescription medications? Yes (P)      Do you have prescription coverage? Yes (P)      Coverage [employer] MANAGED MEDICARE - AETNA MEDICARE PLAN HMO - (P)      Do you have any problems affording any of your prescribed medications? No (P)      Is the patient taking medications as prescribed? yes (P)      Who is going to help you get home at discharge? family/friend (P)      How do you get to doctors appointments? car, drives self (P)      Are you on dialysis? No (P)      Do you take coumadin? No (P)      Discharge Plan A Home;Home with family (P)      Discharge Plan B Home;Home with family (P)      DME Needed Upon Discharge  none (P)      Discharge Plan discussed with: Patient (P)         Physical Activity    On average, how many days per week do you engage in moderate to strenuous exercise (like a brisk walk)? 0 days (P)      On average, how many minutes do you engage in exercise at this level? 0 min (P)         Financial Resource Strain    How hard is it for you to pay for the very basics like food, housing, medical care, and heating? Not very hard (P)         Housing Stability    In the last 12 months, was there a time when you were not able to pay the mortgage or rent on time? No (P)      At any time in the past 12 months, were you homeless or living in a shelter (including now)? No (P)         Transportation Needs    Has the lack of transportation kept you from medical appointments, meetings, work or from getting things needed for daily living? No (P)         Food Insecurity    Within the past 12 months, you worried that your food would run out before you got the money to buy more. Never true (P)      Within the past 12  months, the food you bought just didn't last and you didn't have money to get more. Never true (P)         Stress    Do you feel stress - tense, restless, nervous, or anxious, or unable to sleep at night because your mind is troubled all the time - these days? Not at all (P)         Social Isolation    How often do you feel lonely or isolated from those around you?  Never (P)         Alcohol Use    Q1: How often do you have a drink containing alcohol? Never (P)      Q2: How many drinks containing alcohol do you have on a typical day when you are drinking? Patient does not drink (P)      Q3: How often do you have six or more drinks on one occasion? Never (P)         Utilities    In the past 12 months has the electric, gas, oil, or water company threatened to shut off services in your home? No (P)         Health Literacy    How often do you need to have someone help you when you read instructions, pamphlets, or other written material from your doctor or pharmacy? Rarely (P)         OTHER    Name(s) of People in Home Mother, Daughter, Grandkid (P)                  ADRIEN Keith, CSW.   Case Management  [facility]  Email: [email or web address]

## 2025-01-08 NOTE — ASSESSMENT & PLAN NOTE
Chronic and stable.    Plan  - Continue home Eliquis  - Consider CTA for pleuritic pain and hypoxia

## 2025-01-08 NOTE — ED PROVIDER NOTES
Encounter Date: 2025       History     Chief Complaint   Patient presents with    Sickle Cell Pain Crisis     Pt arrive via EMS with complaint of Sickle Cell Crisis, full body pain 2 days with N/V and diarrhea     44-year-old female, history of sickle cell disease, just discharged from this hospital 2 days ago after being admitted for a fever and sickle cell pain crisis, now presenting with diffuse myalgias and pain consistent with a sickle cell pain crisis as well as nausea, vomiting and diarrhea for the past day.  In addition, patient has had a fever.  She notes that she is prescribed oxycodone 15 mg short-acting and takes about 6 a day but has not been able to tolerate them today because of her nausea and vomiting.  Last Tylenol dose was 6 hours ago.  She also endorses some mild shortness of breath.    The history is provided by the patient.     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    Asthma     Avascular necrosis of femur     Depression     History of pulmonary embolism     Hypertension     Morbid obesity     Opioid dependence 2017    Pneumonia due to Streptococcus pneumoniae 2017    Right lower lobe pneumonia 2017    Sepsis due to Streptococcus pneumoniae 2017    Sickle cell-beta thalassemia disease with pain     Trigeminal neuralgia      Past Surgical History:   Procedure Laterality Date     SECTION      ESOPHAGOGASTRODUODENOSCOPY N/A 2024    Procedure: EGD (ESOPHAGOGASTRODUODENOSCOPY);  Surgeon: Allen Marshall MD;  Location: [address];  Service: Gastroenterology;  Laterality: N/A;    TONSILLECTOMY      TUBAL LIGATION       Family History    Problem Relation Name Age of Onset    Heart disease Mother      Diabetes Mother      Heart disease Father      Breast cancer Neg Hx      Ovarian cancer Neg Hx      Colon cancer Neg Hx       Social History     Tobacco Use    Smoking status: Never    Smokeless tobacco: Never   Substance Use Topics    Alcohol use: No    Drug use: Yes     Types: Marijuana     Comment: periodically      Review of Systems    Physical Exam     Initial Vitals [01/07/25 1709]   BP Pulse Resp Temp SpO2   (!) 180/98 82 18 (!) 100.6 °F (38.1 °C) 99 %      MAP       --         Physical Exam    Nursing note and vitals reviewed.  Constitutional: Vital signs are normal. She appears well-developed and well-nourished. She is not diaphoretic.  Non-toxic appearance. She does not appear ill. No distress.   HENT:   Head: Normocephalic and atraumatic. Mouth/Throat: Mucous membranes are normal. Mucous membranes are not dry.   Eyes: Conjunctivae and lids are normal.   Neck: Neck supple.   Normal range of motion.  Cardiovascular:  Normal rate.           Pulmonary/Chest: No respiratory distress.   Abdominal: Abdomen is soft. She exhibits no distension. There is no abdominal tenderness. There is no rebound and no guarding.   Musculoskeletal:         General: No edema.      Cervical back: Normal range of motion and neck supple.     Neurological: She is alert and oriented to person, place, and time.   Skin: Skin is warm, dry and intact. No pallor.   Psychiatric: She has a normal mood and affect. Her speech is normal and behavior is normal.         ED Course   Procedures  Labs Reviewed   CBC W/ AUTO DIFFERENTIAL - Abnormal       Result Value    WBC 5.98      RBC 4.39      Hemoglobin 9.8 (*)     Hematocrit 27.7 (*)     MCV 63 (*)     MCH 22.3 (*)     MCHC 35.4      RDW 21.2 (*)     Platelets 481 (*)     MPV 9.8      Immature Granulocytes 0.3      Gran # (ANC) 2.8      Immature Grans (Abs) 0.02      Lymph # 2.6      Mono # 0.5      Eos # 0.0      Baso # 0.03       nRBC 1 (*)     Gran % 46.9      Lymph % 44.1      Mono % 7.9      Eosinophil % 0.3      Basophil % 0.5      Differential Method Automated     COMPREHENSIVE METABOLIC PANEL - Abnormal    Sodium 141      Potassium 2.9 (*)     Chloride 109      CO2 20 (*)     Glucose 93      BUN 8      Creatinine 1.1      Calcium 9.7      Total Protein 8.6 (*)     Albumin 4.0      Total Bilirubin 0.5      Alkaline Phosphatase 90      AST 17      ALT 10      eGFR >60.0      Anion Gap 12     URINALYSIS, REFLEX TO URINE CULTURE - Abnormal    Specimen UA Urine, Clean Catch      Color, UA Yellow      Appearance, UA Clear      pH, UA 6.0      Specific Gravity, UA 1.015      Protein, UA Trace (*)     Glucose, UA Negative      Ketones, UA Negative      Bilirubin (UA) Negative      Occult Blood UA Negative      Nitrite, UA Negative      Leukocytes, UA 1+ (*)     Narrative:     Specimen Source->Urine   CBC W/ AUTO DIFFERENTIAL - Abnormal    WBC 8.74      RBC 4.05      Hemoglobin 8.7 (*)     Hematocrit 25.7 (*)     MCV 64 (*)     MCH 21.5 (*)     MCHC 33.9      RDW 22.1 (*)     Platelets 363      MPV 9.8      Immature Granulocytes 0.3      Gran # (ANC) 3.5      Immature Grans (Abs) 0.03      Lymph # 4.2      Mono # 0.9      Eos # 0.1      Baso # 0.09      nRBC 1 (*)     Gran % 39.8      Lymph % 47.6      Mono % 9.7      Eosinophil % 1.6      Basophil % 1.0      Differential Method Automated     URINALYSIS MICROSCOPIC - Abnormal    RBC, UA 1      WBC, UA 4      Bacteria Moderate (*)     Squam Epithel, UA 4      Microscopic Comment SEE COMMENT      Narrative:     Specimen Source->Urine   CULTURE, BLOOD    Blood Culture, Routine No Growth to date     CULTURE, BLOOD    Blood Culture, Routine No Growth to date     CLOSTRIDIUM DIFFICILE   RETICULOCYTES    Retic 1.2     LACTIC ACID, PLASMA    Lactate (Lactic Acid) 0.8     SARS-COV2 (COVID) WITH FLU/RSV BY PCR    SARS-CoV2 (COVID-19) Qualitative PCR Not Detected      Influenza A, Molecular Not Detected       Influenza B, Molecular Not Detected      RSV Ag by Molecular Method Not Detected     MAGNESIUM   MAGNESIUM    Magnesium 1.8      Narrative:     ADD ON MAGNESIUM PER DR DIAZ QUISPE/ORDER# [id number]   COMPREHENSIVE METABOLIC PANEL    Sodium 140      Potassium 3.6      Chloride 107      CO2 24      Glucose 109      BUN 8      Creatinine 1.1      Calcium 9.4      Total Protein 8.0      Albumin 3.6      Total Bilirubin 0.5      Alkaline Phosphatase 81      AST 23      ALT 10      eGFR >60.0      Anion Gap 9     MAGNESIUM    Magnesium 1.7     PHOSPHORUS    Phosphorus 3.1            Imaging Results              X-Ray Chest AP Portable (Final result)  Result time 01/08/25 02:10:07      Final result by Estevan Mahmood MD (01/08/25 02:10:07)                   Impression:      No acute intrathoracic process.      Electronically signed by: Estevan Mahmood MD  Date:    01/08/2025  Time:    02:10               Narrative:    EXAMINATION:  XR CHEST AP PORTABLE    CLINICAL HISTORY:  fever;    TECHNIQUE:  Single frontal view of the chest was performed.    COMPARISON:  12/29/2024.    FINDINGS:  There is unchanged appearance of a right-sided chest port with its tip in the lower SVC.  The trachea is unremarkable.  The cardiomediastinal silhouette is within normal limits.  The hilar structures are unremarkable.  There is no evidence of free air beneath hemidiaphragms.  There are no pleural effusions.  There is no evidence of a pneumothorax.  There is no evidence of pneumomediastinum.  No airspace opacity is present.  The osseous structures are unremarkable.                                       Medications   sodium chloride 0.9% flush 10 mL (has no administration in time range)   melatonin tablet 6 mg (has no administration in time range)   acetaminophen tablet 975 mg (975 mg Oral Given 1/8/25 0804)   amLODIPine tablet 10 mg (10 mg Oral Given 1/8/25 0804)   apixaban tablet 5 mg (5 mg Oral Given 1/8/25 0804)   folic acid tablet 1 mg (1 mg  Oral Given 1/8/25 0804)   hydroxyurea capsule 1,000 mg (1,000 mg Oral Given 1/8/25 0804)   gabapentin capsule 600 mg (600 mg Oral Given 1/8/25 0804)   methocarbamoL tablet 750 mg (750 mg Oral Given 1/8/25 0804)   pantoprazole EC tablet 40 mg (40 mg Oral Given 1/8/25 0804)   polyethylene glycol packet 17 g (17 g Oral Not Given 1/8/25 0900)   senna-docusate 8.6-50 mg per tablet 1 tablet (1 tablet Oral Not Given 1/8/25 0900)   sucralfate tablet 1 g (1 g Oral Given 1/8/25 1105)   albuterol inhaler 2 puff (has no administration in time range)   calcium carbonate 200 mg calcium (500 mg) chewable tablet 1,000 mg (has no administration in time range)   dextrose 50% injection 12.5 g (has no administration in time range)   dextrose 50% injection 25 g (has no administration in time range)   prochlorperazine tablet 10 mg (10 mg Oral Given 1/8/25 0631)   oxyCODONE immediate release tablet 15 mg (has no administration in time range)   naloxone 0.4 mg/mL injection 0.2 mg (has no administration in time range)   HYDROmorphone injection 3 mg (3 mg Intravenous Given 1/8/25 1200)   glucose chewable tablet 24 g (has no administration in time range)   glucose chewable tablet 16 g (has no administration in time range)   glucagon (human recombinant) injection 1 mg (has no administration in time range)   diphenhydrAMINE-zinc acetate 2-0.1% cream (has no administration in time range)   ondansetron disintegrating tablet 8 mg (8 mg Oral Given 1/8/25 0054)   0.9% NaCl infusion ( Intravenous New Bag 1/8/25 0201)   diphenhydrAMINE injection 25 mg (25 mg Intravenous Given 1/8/25 1109)   cefTRIAXone injection 2 g (has no administration in time range)   ondansetron injection 4 mg (4 mg Intravenous Given 1/7/25 2006)   acetaminophen tablet 1,000 mg (1,000 mg Oral Given 1/7/25 2005)   HYDROmorphone injection 1 mg (1 mg Intravenous Given 1/7/25 2007)   diphenhydrAMINE capsule 25 mg (25 mg Oral Given 1/7/25 2007)   potassium bicarbonate disintegrating  tablet 25 mEq (25 mEq Oral Given 1/7/25 2128)   HYDROmorphone injection 1 mg (1 mg Intravenous Given 1/7/25 2128)   HYDROmorphone injection 1 mg (1 mg Intravenous Given 1/7/25 2256)   potassium bicarbonate disintegrating tablet 40 mEq (40 mEq Oral Given 1/7/25 2357)   potassium chloride 10 mEq in 100 mL IVPB (0 mEq Intravenous Stopped 1/8/25 0528)   diphenhydrAMINE injection 12.5 mg (12.5 mg Intravenous Given 1/8/25 0507)     Medical Decision Making  1. Sickle cell pain crisis.  Patient with frequent crises.  Admission this week with concerning behaviors.  On exam she is well-appearing clinically.  Plan for labs and pain control.  Will avoid IV Benadryl given high potential for abuse    2. In this patient with a fever, I am concerned about ruling out a serious infection and/or sepsis.  In terms of a source, possibilities include but are not limited to a UTI/pyelonephritis, pneumonia, skin/soft tissue infection, bacteremia, intra-abdominal process, discitis/osteomyelitis, septic arthritis, and endocarditis.  A viral illness like influenza, COVID is also possible.    The following tests will be ordered in order to determine the source of the patient's infection:  -Labs: blood cultures, CBC, CMP, UA, viral testing  -Imaging studies: CXR  -Empiric antibiotics will not be started prior to determining source of the infection , given overall well appearance, fever this week as well on admission    -Will plan for frequent VS checks, close monitoring    -Disposition:  Uncertain pending ED workup      Amount and/or Complexity of Data Reviewed  External Data Reviewed: labs, radiology and notes.  Labs: ordered. Decision-making details documented in ED Course.  Radiology: ordered and independent interpretation performed. Decision-making details documented in ED Course.  ECG/medicine tests: ordered and independent interpretation performed. Decision-making details documented in ED Course.    Risk  OTC drugs.  Prescription drug  management.  Decision regarding hospitalization.                                      Clinical Impression:  Final diagnoses:  [D57.00] Sickle cell anemia with pain (Primary)          ED Disposition Condition    Admit                 Marie Gleason MD  01/08/25 5447

## 2025-01-08 NOTE — ASSESSMENT & PLAN NOTE
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 She was recently admitted to [facility] 12/29/24 - 1/5/25 for a pain crisis.  CXR without focal consolidation. I am not concerned for acute chest at this time.    Plan  - Multimodal pain regimen: scheduled tylenol, robaxin, and oxycodone   - Dilaudid 3mg IV q3h prn  - mIVF  - Folic acid, hydroxyurea and other home SCD medications reordered  - Repeat CXR for increased O2 requirement

## 2025-01-08 NOTE — TELEPHONE ENCOUNTER
"Pt inquired about blood exchanges. Advised pt will discuss with provider about coordinating an appointment after discharge to see benign hematologist specialist provider for further conversation about blood exchanges. Pt verbalized understanding and agreeable to "whenever I can be seen".  "

## 2025-01-08 NOTE — ASSESSMENT & PLAN NOTE
Uses  tylenol, oxycodone 15mg, and muscle relaxers. Pain has been refractory to these due to reported emesis.    Plan  - See Vaso-occlusive pain

## 2025-01-08 NOTE — H&P
"  [facility] Dept  [facility]  History & Physical    Patient Name: Nazanin Malone  MRN: [id number]  Patient Class: OP- Observation  Admission Date: 1/7/2025  Attending Physician: Hawk Cedeno MD   Primary Care Provider: Pee Montgomery MD         Patient information was obtained from patient, past medical records, and ER records.     Subjective:     Principal Problem:Vaso-occlusive pain due to sickle cell disease    Chief Complaint:   Chief Complaint   Patient presents with    Sickle Cell Pain Crisis     Pt arrive via EMS with complaint of Sickle Cell Crisis, full body pain 2 days with N/V and diarrhea        HPI: Nazanin Malone is a 44-year-old female with sickle cell beta thalassemia zero, multiple pulmonary emboli on chronic AC, asthma, and HTN who presented to [facility] ED 1/4/25 for diffuse myalgias and pain consistent with a sickle cell pain crisis as well as fever, nausea, vomiting, and diarrhea for the past day. She was recently admitted to [facility] 12/29/24 - 1/5/25 for pain c/w sickle cell pain crises which was refractory to her home regimen of tylenol, oxycodone 15mg, and muscle relaxers. She reported that she moved to the area about 6 weeks ago after living in [place]. She was receiving chronic exchange transfusions there but is not currently receiving them as part of her therapy here. She is seeing a hematologist. She also reports that she previously was on MS contin, pen VK, and hydroxurea as part of her home regimen but has not been put back on those since she has moved here. Of note, during that recent admission, the provider was informed by nursing that the patient asked if she could be given a little more medicine than what was prescribed stating "no one has to know". She also asked if nursing needed some help to throw away an empty flush syringe and an empty med syringe. At that time, she was prescribed Dilaudid 3mg IV q3h PRN and IVF with improvement in her pain.    In the ED, " /99 HR 74 RR 20 sats adequate on ambient air. Tmax 100.9. Labs notable for Hgb 9.8 and K 2.9. LA wnl. COVID, Flu, and RSV negative. Blood cultures were obtained. UA was ordered. CXR showed . Received Dilaudid 3mg IV in total, Tylenol 1g, Benadryl 25mg PO, Zofran 4mg IV, and potassium 25mEq PO.    The patient was admitted to Hospital Medicine for further workup and management.    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    Asthma     Avascular necrosis of femur     Depression     History of pulmonary embolism     Hypertension     Morbid obesity     Opioid dependence 2017    Pneumonia due to Streptococcus pneumoniae 2017    Right lower lobe pneumonia 2017    Sepsis due to Streptococcus pneumoniae 2017    Sickle cell-beta thalassemia disease with pain     Trigeminal neuralgia        Past Surgical History:   Procedure Laterality Date     SECTION      ESOPHAGOGASTRODUODENOSCOPY N/A 2024    Procedure: EGD (ESOPHAGOGASTRODUODENOSCOPY);  Surgeon: Allen Marshall MD;  Location: [address]);  Service: Gastroenterology;  Laterality: N/A;    TONSILLECTOMY      TUBAL LIGATION         Review of patient's allergies indicates:   Allergen Reactions    Cat dander Anaphylaxis, Itching and Shortness Of Breath    Nsaids (non-steroidal anti-inflammatory drug) Itching and Anaphylaxis    Latex Rash       No current facility-administered medications on file prior to encounter.     Current Outpatient Medications on File Prior to Encounter   Medication Sig    acetaminophen (TYLENOL) 500 MG tablet Take 1,000 mg by mouth every 8 (eight) hours as needed for Pain.    albuterol (VENTOLIN HFA) 90 mcg/actuation inhaler Inhale 2 puffs into the lungs every 6 (six) hours as needed for Wheezing or Shortness of Breath. Rescue    amLODIPine (NORVASC) 10 MG tablet Take 1 tablet (10 mg total) by  mouth once daily.    apixaban (ELIQUIS) 5 mg Tab Take 1 tablet (5 mg total) by mouth 2 (two) times daily.    ascorbic acid (VITAMIN C ORAL) Take 1 capsule by mouth once daily.    FLUoxetine 40 MG capsule Take 1 capsule (40 mg total) by mouth once daily.    fluticasone propionate (FLONASE) 50 mcg/actuation nasal spray INSTILL 1 SPRAY INTO EACH NOSTRIL EVERY DAY    folic acid (FOLVITE) 1 MG tablet Take 1 tablet (1 mg total) by mouth once daily.    gabapentin (NEURONTIN) 300 MG capsule Take 2 capsules (600 mg total) by mouth 3 (three) times daily.    hydroxyurea (HYDREA) 500 mg Cap Take 2 capsules (1,000 mg total) by mouth once daily.    hydrOXYzine pamoate (VISTARIL) 25 MG Cap Take 1 capsule (25 mg total) by mouth every 4 (four) hours as needed (Anxiety).    LORazepam (ATIVAN) 0.5 MG tablet Take 2 tablets (1 mg total) by mouth every 8 (eight) hours as needed for Anxiety.    metoclopramide HCl (REGLAN) 5 MG tablet Take 1 tablet (5 mg total) by mouth 3 (three) times daily before meals.    oxyCODONE (ROXICODONE) 15 MG Tab Take 1 tablet (15 mg total) by mouth every 4 (four) hours as needed for Pain.    pantoprazole (PROTONIX) 40 MG tablet Take 1 tablet (40 mg total) by mouth 2 (two) times daily.    promethazine (PHENERGAN) 12.5 MG Tab Take 2 tablets (25 mg total) by mouth 4 (four) times daily.    senna-docusate 8.6-50 mg (PERICOLACE) 8.6-50 mg per tablet Take 1 tablet by mouth daily as needed for Constipation.    sucralfate (CARAFATE) 1 gram tablet Take 1 tablet (1 g total) by mouth 4 (four) times daily before meals and nightly.    tiZANidine (ZANAFLEX) 2 MG tablet Take 2 tablets (4 mg total) by mouth every 8 (eight) hours as needed.     Family History       Problem Relation (Age of Onset)    Diabetes Mother    Heart disease Mother, Father          Tobacco Use    Smoking status: Never    Smokeless tobacco: Never   Substance and Sexual Activity    Alcohol use: No    Drug use: Yes     Types: Marijuana     Comment:  periodically     Sexual activity: Not Currently     Birth control/protection: See Surgical Hx     Review of Systems   Constitutional:  Positive for activity change, appetite change, fatigue and fever.   HENT:  Negative for sore throat.    Eyes:  Negative for visual disturbance.   Respiratory:  Negative for cough, shortness of breath and wheezing.    Cardiovascular:  Negative for chest pain, palpitations and leg swelling.   Gastrointestinal:  Positive for abdominal pain, diarrhea, nausea and vomiting. Negative for abdominal distention.   Genitourinary:  Negative for dysuria and flank pain.   Musculoskeletal:  Positive for myalgias.   Skin:  Negative for color change.   Neurological:  Negative for dizziness and headaches.   Psychiatric/Behavioral:  Negative for confusion and decreased concentration.      Objective:     Vital Signs (Most Recent):  Temp: 99.9 °F (37.7 °C) (01/07/25 2343)  Pulse: 77 (01/07/25 2343)  Resp: 20 (01/07/25 2256)  BP: (!) 155/108 (01/07/25 2343)  SpO2: 97 % (01/07/25 2343) Vital Signs (24h Range):  Temp:  [99.7 °F (37.6 °C)-100.9 °F (38.3 °C)] 99.9 °F (37.7 °C)  Pulse:  [74-82] 77  Resp:  [18-20] 20  SpO2:  [97 %-100 %] 97 %  BP: (155-180)/() 155/108     Weight: 93.4 kg (206 lb)  Body mass index is 37.68 kg/m².     Physical Exam  Vitals reviewed.   Constitutional:       General: She is not in acute distress.     Appearance: Normal appearance. She is obese. She is not ill-appearing or toxic-appearing.      Comments: Degree of stated pain not c/w exam   HENT:      Head: Normocephalic and atraumatic.      Mouth/Throat:      Mouth: Mucous membranes are dry.      Pharynx: Oropharynx is clear.   Eyes:      General: No scleral icterus.     Conjunctiva/sclera: Conjunctivae normal.   Cardiovascular:      Rate and Rhythm: Normal rate and regular rhythm.      Pulses: Normal pulses.      Heart sounds: No murmur heard.  Pulmonary:      Effort: Pulmonary effort is normal. No respiratory distress.       Breath sounds: Normal breath sounds. No wheezing.   Abdominal:      General: There is no distension.      Palpations: Abdomen is soft.      Tenderness: There is no abdominal tenderness. There is no right CVA tenderness, left CVA tenderness, guarding or rebound.   Musculoskeletal:      Cervical back: No tenderness.      Right lower leg: No edema.      Left lower leg: No edema.   Skin:     General: Skin is warm and dry.   Neurological:      General: No focal deficit present.      Mental Status: She is alert and oriented to person, place, and time.   Psychiatric:         Mood and Affect: Mood normal.         Behavior: Behavior normal.                Significant Labs: All pertinent labs within the past 24 hours have been reviewed.    CBC:   Recent Labs   Lab 01/07/25 1934   WBC 5.98   HGB 9.8*   HCT 27.7*   *     CMP:   Recent Labs   Lab 01/07/25 1934      K 2.9*      CO2 20*   GLU 93   BUN 8   CREATININE 1.1   CALCIUM 9.7   PROT 8.6*   ALBUMIN 4.0   BILITOT 0.5   ALKPHOS 90   AST 17   ALT 10   ANIONGAP 12     Lactic Acid:   Recent Labs   Lab 01/07/25 1934   LACTATE 0.8       Significant Imaging: I have reviewed all pertinent imaging results/findings within the past 24 hours.  CXR: I have reviewed all pertinent results/findings within the past 24 hours and my personal findings are:  CXR without focal consolidation.  Assessment/Plan:     * Vaso-occlusive pain due to sickle cell disease  44-year-old female with sickle cell beta thalassemia zero, multiple pulmonary emboli on chronic AC, asthma, and HTN who presented to [facility] ED 1/4/25 for diffuse myalgias and pain consistent with a sickle cell pain crisis as well as fever, nausea, vomiting, and diarrhea for the past day. She was recently admitted to [facility] [place] [facility] 12/29/24 - 1/5/25 for a pain crisis.  CXR without focal consolidation. I am not concerned for acute chest at this time.    Plan  - Multimodal pain regimen: scheduled tylenol, robaxin, and oxycodone   -  Dilaudid 3mg IV q3h prn  - mIVF  - Folic acid, hydroxyurea and other home SCD medications reordered  - Repeat CXR for increased O2 requirement    Fever  Tmax 100.6 in the ED.    Plan  - CTX 2g q24h  - Follow-up blood cultures      Chronic prescription opiate use  Uses  tylenol, oxycodone 15mg, and muscle relaxers. Pain has been refractory to these due to reported emesis.    Plan  - See Vaso-occlusive pain      Diarrhea  Reported for the past day.    Plan  - Follow up C. diff      Chronic gastritis  Chronic and stable.    Plan  - Continue home PPI and carafate      Chronic anticoagulation  See History of PE      History of pulmonary embolism  Chronic and stable.    Plan  - Continue home Eliquis  - Consider CTA for pleuritic pain and hypoxia      Intermittent asthma  Chronic and stable. Not in respiratory distress.    Plan  - Continue home albuterol PRN      Hypertension  Patient's blood pressure range in the last 24 hours was: BP  Min: 155/108  Max: 180/98.The patient's inpatient anti-hypertensive regimen is listed below:  Current Antihypertensives  amLODIPine tablet 10 mg, Daily, Oral    Plan  - BP is controlled, no changes needed to their regimen      VTE Risk Mitigation (From admission, onward)           Ordered     apixaban tablet 5 mg  2 times daily         01/07/25 2326     IP VTE HIGH RISK PATIENT  Once         01/07/25 2326     Place sequential compression device  Until discontinued         01/07/25 2326                       On 01/08/2025, patient should be placed in hospital observation services under my care.             Abdi Ngo MD  Department of Hospital Medicine  [facility] Dept

## 2025-01-08 NOTE — TELEPHONE ENCOUNTER
----- Message from Nina sent at 1/8/2025 10:38 AM CST -----  Regarding: Consult/Advisory  Contact: pt @ [phone]  Consult/Advisory     Name Of Caller: Nazanin Malone     Contact Preference: [phone]     Nature of call: pt is in the hospital now, calling to get office to call back to get her scheduled to have her blood exchange. Asking for an urgent call back

## 2025-01-08 NOTE — PLAN OF CARE
Problem: Adult Inpatient Plan of Care  Goal: Plan of Care Review  Flowsheets (Taken 1/8/2025 0409)  Plan of Care Reviewed With: patient  Goal: Absence of Hospital-Acquired Illness or Injury  Intervention: Identify and Manage Fall Risk  Flowsheets (Taken 1/8/2025 0409)  Safety Promotion/Fall Prevention:   medications reviewed   Fall Risk reviewed with patient/family  Intervention: Prevent Skin Injury  Flowsheets (Taken 1/8/2025 0409)  Body Position: position changed independently  Intervention: Prevent and Manage VTE (Venous Thromboembolism) Risk  Flowsheets (Taken 1/8/2025 0409)  VTE Prevention/Management: ROM (active) performed  Goal: Optimal Comfort and Wellbeing  Intervention: Monitor Pain and Promote Comfort  Flowsheets (Taken 1/8/2025 0409)  Pain Management Interventions: pain management plan reviewed with patient/caregiver  Intervention: Provide Person-Centered Care  Flowsheets (Taken 1/8/2025 0409)  Trust Relationship/Rapport:   care explained   choices provided   questions answered   questions encouraged   reassurance provided   thoughts/feelings acknowledged     Problem: Sickle Cell Disease  Goal: Optimal Cerebral Tissue Perfusion  Intervention: Protect and Optimize Cerebral Perfusion  Flowsheets (Taken 1/8/2025 0409)  Glycemic Management: blood glucose monitored  Goal: Absence of Infection Signs and Symptoms  Intervention: Prevent or Manage Infection  Flowsheets (Taken 1/8/2025 0409)  Infection Management: (IV antibiotic therapy)   aseptic technique maintained   cultures obtained and sent to lab   other (see comments)  Goal: Optimal Pain Control and Function  Intervention: Manage Acute Pain  Flowsheets (Taken 1/8/2025 0409)  Pain Management Interventions: pain management plan reviewed with patient/caregiver  Intervention: Acknowledge Underlying Chronic Pain  Flowsheets (Taken 1/8/2025 0409)  Medication Review/Management: medications reviewed  Goal: Optimal Oxygenation  Intervention: Optimize  Oxygenation  Flowsheets (Taken 1/8/2025 3169)  Cough And Deep Breathing: done independently per patient

## 2025-01-08 NOTE — SUBJECTIVE & OBJECTIVE
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    Asthma     Avascular necrosis of femur     Depression     History of pulmonary embolism     Hypertension     Morbid obesity     Opioid dependence 2017    Pneumonia due to Streptococcus pneumoniae 2017    Right lower lobe pneumonia 2017    Sepsis due to Streptococcus pneumoniae 2017    Sickle cell-beta thalassemia disease with pain     Trigeminal neuralgia        Past Surgical History:   Procedure Laterality Date     SECTION      ESOPHAGOGASTRODUODENOSCOPY N/A 2024    Procedure: EGD (ESOPHAGOGASTRODUODENOSCOPY);  Surgeon: Allen Marshall MD;  Location: [address]);  Service: Gastroenterology;  Laterality: N/A;    TONSILLECTOMY      TUBAL LIGATION         Review of patient's allergies indicates:   Allergen Reactions    Cat dander Anaphylaxis, Itching and Shortness Of Breath    Nsaids (non-steroidal anti-inflammatory drug) Itching and Anaphylaxis    Latex Rash       No current facility-administered medications on file prior to encounter.     Current Outpatient Medications on File Prior to Encounter   Medication Sig    acetaminophen (TYLENOL) 500 MG tablet Take 1,000 mg by mouth every 8 (eight) hours as needed for Pain.    albuterol (VENTOLIN HFA) 90 mcg/actuation inhaler Inhale 2 puffs into the lungs every 6 (six) hours as needed for Wheezing or Shortness of Breath. Rescue    amLODIPine (NORVASC) 10 MG tablet Take 1 tablet (10 mg total) by mouth once daily.    apixaban (ELIQUIS) 5 mg Tab Take 1 tablet (5 mg total) by mouth 2 (two) times daily.    ascorbic acid (VITAMIN C ORAL) Take 1 capsule by mouth once daily.    FLUoxetine 40 MG capsule Take 1 capsule (40 mg total) by mouth once daily.    fluticasone propionate (FLONASE) 50 mcg/actuation nasal spray INSTILL 1 SPRAY INTO EACH NOSTRIL EVERY DAY    folic acid (FOLVITE) 1 MG  tablet Take 1 tablet (1 mg total) by mouth once daily.    gabapentin (NEURONTIN) 300 MG capsule Take 2 capsules (600 mg total) by mouth 3 (three) times daily.    hydroxyurea (HYDREA) 500 mg Cap Take 2 capsules (1,000 mg total) by mouth once daily.    hydrOXYzine pamoate (VISTARIL) 25 MG Cap Take 1 capsule (25 mg total) by mouth every 4 (four) hours as needed (Anxiety).    LORazepam (ATIVAN) 0.5 MG tablet Take 2 tablets (1 mg total) by mouth every 8 (eight) hours as needed for Anxiety.    metoclopramide HCl (REGLAN) 5 MG tablet Take 1 tablet (5 mg total) by mouth 3 (three) times daily before meals.    oxyCODONE (ROXICODONE) 15 MG Tab Take 1 tablet (15 mg total) by mouth every 4 (four) hours as needed for Pain.    pantoprazole (PROTONIX) 40 MG tablet Take 1 tablet (40 mg total) by mouth 2 (two) times daily.    promethazine (PHENERGAN) 12.5 MG Tab Take 2 tablets (25 mg total) by mouth 4 (four) times daily.    senna-docusate 8.6-50 mg (PERICOLACE) 8.6-50 mg per tablet Take 1 tablet by mouth daily as needed for Constipation.    sucralfate (CARAFATE) 1 gram tablet Take 1 tablet (1 g total) by mouth 4 (four) times daily before meals and nightly.    tiZANidine (ZANAFLEX) 2 MG tablet Take 2 tablets (4 mg total) by mouth every 8 (eight) hours as needed.     Family History       Problem Relation (Age of Onset)    Diabetes Mother    Heart disease Mother, Father          Tobacco Use    Smoking status: Never    Smokeless tobacco: Never   Substance and Sexual Activity    Alcohol use: No    Drug use: Yes     Types: Marijuana     Comment: periodically     Sexual activity: Not Currently     Birth control/protection: See Surgical Hx     Review of Systems   Constitutional:  Positive for activity change, appetite change, fatigue and fever.   HENT:  Negative for sore throat.    Eyes:  Negative for visual disturbance.   Respiratory:  Negative for cough, shortness of breath and wheezing.    Cardiovascular:  Negative for chest pain,  palpitations and leg swelling.   Gastrointestinal:  Positive for abdominal pain, diarrhea, nausea and vomiting. Negative for abdominal distention.   Genitourinary:  Negative for dysuria and flank pain.   Musculoskeletal:  Positive for myalgias.   Skin:  Negative for color change.   Neurological:  Negative for dizziness and headaches.   Psychiatric/Behavioral:  Negative for confusion and decreased concentration.      Objective:     Vital Signs (Most Recent):  Temp: 99.9 °F (37.7 °C) (01/07/25 2343)  Pulse: 77 (01/07/25 2343)  Resp: 20 (01/07/25 2256)  BP: (!) 155/108 (01/07/25 2343)  SpO2: 97 % (01/07/25 2343) Vital Signs (24h Range):  Temp:  [99.7 °F (37.6 °C)-100.9 °F (38.3 °C)] 99.9 °F (37.7 °C)  Pulse:  [74-82] 77  Resp:  [18-20] 20  SpO2:  [97 %-100 %] 97 %  BP: (155-180)/() 155/108     Weight: 93.4 kg (206 lb)  Body mass index is 37.68 kg/m².     Physical Exam  Vitals reviewed.   Constitutional:       General: She is not in acute distress.     Appearance: Normal appearance. She is obese. She is not ill-appearing or toxic-appearing.      Comments: Degree of stated pain not c/w exam   HENT:      Head: Normocephalic and atraumatic.      Mouth/Throat:      Mouth: Mucous membranes are dry.      Pharynx: Oropharynx is clear.   Eyes:      General: No scleral icterus.     Conjunctiva/sclera: Conjunctivae normal.   Cardiovascular:      Rate and Rhythm: Normal rate and regular rhythm.      Pulses: Normal pulses.      Heart sounds: No murmur heard.  Pulmonary:      Effort: Pulmonary effort is normal. No respiratory distress.      Breath sounds: Normal breath sounds. No wheezing.   Abdominal:      General: There is no distension.      Palpations: Abdomen is soft.      Tenderness: There is no abdominal tenderness. There is no right CVA tenderness, left CVA tenderness, guarding or rebound.   Musculoskeletal:      Cervical back: No tenderness.      Right lower leg: No edema.      Left lower leg: No edema.   Skin:      General: Skin is warm and dry.   Neurological:      General: No focal deficit present.      Mental Status: She is alert and oriented to person, place, and time.   Psychiatric:         Mood and Affect: Mood normal.         Behavior: Behavior normal.                Significant Labs: All pertinent labs within the past 24 hours have been reviewed.    CBC:   Recent Labs   Lab 01/07/25 1934   WBC 5.98   HGB 9.8*   HCT 27.7*   *     CMP:   Recent Labs   Lab 01/07/25 1934      K 2.9*      CO2 20*   GLU 93   BUN 8   CREATININE 1.1   CALCIUM 9.7   PROT 8.6*   ALBUMIN 4.0   BILITOT 0.5   ALKPHOS 90   AST 17   ALT 10   ANIONGAP 12     Lactic Acid:   Recent Labs   Lab 01/07/25 1934   LACTATE 0.8       Significant Imaging: I have reviewed all pertinent imaging results/findings within the past 24 hours.  CXR: I have reviewed all pertinent results/findings within the past 24 hours and my personal findings are:  CXR without focal consolidation.

## 2025-01-08 NOTE — ASSESSMENT & PLAN NOTE
Patient's blood pressure range in the last 24 hours was: BP  Min: 155/108  Max: 180/98.The patient's inpatient anti-hypertensive regimen is listed below:  Current Antihypertensives  amLODIPine tablet 10 mg, Daily, Oral    Plan  - BP is controlled, no changes needed to their regimen

## 2025-01-08 NOTE — HPI
"Nazanin Malone is a 44-year-old female with sickle cell beta thalassemia zero, multiple pulmonary emboli on chronic AC, asthma, and HTN who presented to [facility] ED 1/4/25 for diffuse myalgias and pain consistent with a sickle cell pain crisis as well as fever, nausea, vomiting, and diarrhea for the past day. She was recently admitted to [facility] 12/29/24 - 1/5/25 for pain c/w sickle cell pain crises which was refractory to her home regimen of tylenol, oxycodone 15mg, and muscle relaxers. She reported that she moved to the area about 6 weeks ago after living in [place]. She was receiving chronic exchange transfusions there but is not currently receiving them as part of her therapy here. She is seeing a hematologist. She also reports that she previously was on MS contin, pen VK, and hydroxurea as part of her home regimen but has not been put back on those since she has moved here. Of note, during that recent admission, the provider was informed by nursing that the patient asked if she could be given a little more medicine than what was prescribed stating "no one has to know". She also asked if nursing needed some help to throw away an empty flush syringe and an empty med syringe. At that time, she was prescribed Dilaudid 3mg IV q3h PRN and IVF with improvement in her pain.    In the ED, /99 HR 74 RR 20 sats adequate on ambient air. Tmax 100.9. Labs notable for Hgb 9.8 and K 2.9. LA wnl. COVID, Flu, and RSV negative. Blood cultures were obtained. UA was ordered. CXR showed . Received Dilaudid 3mg IV in total, Tylenol 1g, Benadryl 25mg PO, Zofran 4mg IV, and potassium 25mEq PO.    The patient was admitted to Hospital Medicine for further workup and management.  "

## 2025-01-09 LAB
ALBUMIN SERPL BCP-MCNC: 3.4 G/DL (ref 3.5–5.2)
ALP SERPL-CCNC: 76 U/L (ref 40–150)
ALT SERPL W/O P-5'-P-CCNC: 17 U/L (ref 10–44)
ANION GAP SERPL CALC-SCNC: 6 MMOL/L (ref 8–16)
AST SERPL-CCNC: 28 U/L (ref 10–40)
BASOPHILS # BLD AUTO: 0.05 K/UL (ref 0–0.2)
BASOPHILS NFR BLD: 0.7 % (ref 0–1.9)
BILIRUB SERPL-MCNC: 0.3 MG/DL (ref 0.1–1)
BUN SERPL-MCNC: 15 MG/DL (ref 6–20)
CALCIUM SERPL-MCNC: 8.9 MG/DL (ref 8.7–10.5)
CHLORIDE SERPL-SCNC: 112 MMOL/L (ref 95–110)
CO2 SERPL-SCNC: 24 MMOL/L (ref 23–29)
CREAT SERPL-MCNC: 1.4 MG/DL (ref 0.5–1.4)
DIFFERENTIAL METHOD BLD: ABNORMAL
EOSINOPHIL # BLD AUTO: 0.3 K/UL (ref 0–0.5)
EOSINOPHIL NFR BLD: 4 % (ref 0–8)
ERYTHROCYTE [DISTWIDTH] IN BLOOD BY AUTOMATED COUNT: 21.5 % (ref 11.5–14.5)
EST. GFR  (NO RACE VARIABLE): 47 ML/MIN/1.73 M^2
GLUCOSE SERPL-MCNC: 74 MG/DL (ref 70–110)
HCT VFR BLD AUTO: 27.6 % (ref 37–48.5)
HGB BLD-MCNC: 8.8 G/DL (ref 12–16)
IMM GRANULOCYTES # BLD AUTO: 0.01 K/UL (ref 0–0.04)
IMM GRANULOCYTES NFR BLD AUTO: 0.1 % (ref 0–0.5)
LYMPHOCYTES # BLD AUTO: 3.1 K/UL (ref 1–4.8)
LYMPHOCYTES NFR BLD: 44.8 % (ref 18–48)
MAGNESIUM SERPL-MCNC: 1.9 MG/DL (ref 1.6–2.6)
MCH RBC QN AUTO: 20.8 PG (ref 27–31)
MCHC RBC AUTO-ENTMCNC: 31.9 G/DL (ref 32–36)
MCV RBC AUTO: 65 FL (ref 82–98)
MONOCYTES # BLD AUTO: 0.8 K/UL (ref 0.3–1)
MONOCYTES NFR BLD: 11 % (ref 4–15)
NEUTROPHILS # BLD AUTO: 2.7 K/UL (ref 1.8–7.7)
NEUTROPHILS NFR BLD: 39.4 % (ref 38–73)
NRBC BLD-RTO: 2 /100 WBC
PHOSPHATE SERPL-MCNC: 4.6 MG/DL (ref 2.7–4.5)
PLATELET # BLD AUTO: 361 K/UL (ref 150–450)
PMV BLD AUTO: 9.2 FL (ref 9.2–12.9)
POTASSIUM SERPL-SCNC: 4.5 MMOL/L (ref 3.5–5.1)
PROT SERPL-MCNC: 7.3 G/DL (ref 6–8.4)
RBC # BLD AUTO: 4.23 M/UL (ref 4–5.4)
SODIUM SERPL-SCNC: 142 MMOL/L (ref 136–145)
WBC # BLD AUTO: 6.97 K/UL (ref 3.9–12.7)

## 2025-01-09 PROCEDURE — 63600175 PHARM REV CODE 636 W HCPCS: Mod: TB | Performed by: HOSPITALIST

## 2025-01-09 PROCEDURE — 83735 ASSAY OF MAGNESIUM: CPT

## 2025-01-09 PROCEDURE — 11000001 HC ACUTE MED/SURG PRIVATE ROOM

## 2025-01-09 PROCEDURE — 63600175 PHARM REV CODE 636 W HCPCS: Performed by: INTERNAL MEDICINE

## 2025-01-09 PROCEDURE — 87040 BLOOD CULTURE FOR BACTERIA: CPT | Performed by: HOSPITALIST

## 2025-01-09 PROCEDURE — 63600175 PHARM REV CODE 636 W HCPCS: Mod: TB | Performed by: STUDENT IN AN ORGANIZED HEALTH CARE EDUCATION/TRAINING PROGRAM

## 2025-01-09 PROCEDURE — 63600175 PHARM REV CODE 636 W HCPCS: Mod: TB

## 2025-01-09 PROCEDURE — 84100 ASSAY OF PHOSPHORUS: CPT

## 2025-01-09 PROCEDURE — 85025 COMPLETE CBC W/AUTO DIFF WBC: CPT

## 2025-01-09 PROCEDURE — 25000003 PHARM REV CODE 250

## 2025-01-09 PROCEDURE — 80053 COMPREHEN METABOLIC PANEL: CPT

## 2025-01-09 RX ORDER — HYDROMORPHONE HYDROCHLORIDE 1 MG/ML
3 INJECTION, SOLUTION INTRAMUSCULAR; INTRAVENOUS; SUBCUTANEOUS
Status: DISCONTINUED | OUTPATIENT
Start: 2025-01-09 | End: 2025-01-13

## 2025-01-09 RX ORDER — HYDROMORPHONE HYDROCHLORIDE 1 MG/ML
2 INJECTION, SOLUTION INTRAMUSCULAR; INTRAVENOUS; SUBCUTANEOUS EVERY 4 HOURS PRN
Status: DISCONTINUED | OUTPATIENT
Start: 2025-01-09 | End: 2025-01-09

## 2025-01-09 RX ADMIN — HYDROMORPHONE HYDROCHLORIDE 3 MG: 1 INJECTION, SOLUTION INTRAMUSCULAR; INTRAVENOUS; SUBCUTANEOUS at 05:01

## 2025-01-09 RX ADMIN — HYDROMORPHONE HYDROCHLORIDE 3 MG: 1 INJECTION, SOLUTION INTRAMUSCULAR; INTRAVENOUS; SUBCUTANEOUS at 12:01

## 2025-01-09 RX ADMIN — SENNOSIDES AND DOCUSATE SODIUM 1 TABLET: 50; 8.6 TABLET ORAL at 08:01

## 2025-01-09 RX ADMIN — GABAPENTIN 600 MG: 300 CAPSULE ORAL at 03:01

## 2025-01-09 RX ADMIN — DIPHENHYDRAMINE HYDROCHLORIDE 25 MG: 50 INJECTION, SOLUTION INTRAMUSCULAR; INTRAVENOUS at 05:01

## 2025-01-09 RX ADMIN — HYDROXYUREA 1000 MG: 500 CAPSULE ORAL at 08:01

## 2025-01-09 RX ADMIN — FOLIC ACID 1 MG: 1 TABLET ORAL at 08:01

## 2025-01-09 RX ADMIN — DIPHENHYDRAMINE HYDROCHLORIDE 25 MG: 50 INJECTION, SOLUTION INTRAMUSCULAR; INTRAVENOUS at 09:01

## 2025-01-09 RX ADMIN — METHOCARBAMOL 750 MG: 750 TABLET ORAL at 09:01

## 2025-01-09 RX ADMIN — SUCRALFATE 1 G: 1 TABLET ORAL at 05:01

## 2025-01-09 RX ADMIN — ACETAMINOPHEN 975 MG: 325 TABLET ORAL at 03:01

## 2025-01-09 RX ADMIN — APIXABAN 5 MG: 5 TABLET, FILM COATED ORAL at 08:01

## 2025-01-09 RX ADMIN — SUCRALFATE 1 G: 1 TABLET ORAL at 11:01

## 2025-01-09 RX ADMIN — METHOCARBAMOL 750 MG: 750 TABLET ORAL at 03:01

## 2025-01-09 RX ADMIN — DIPHENHYDRAMINE HYDROCHLORIDE 25 MG: 50 INJECTION, SOLUTION INTRAMUSCULAR; INTRAVENOUS at 12:01

## 2025-01-09 RX ADMIN — ACETAMINOPHEN 975 MG: 325 TABLET ORAL at 09:01

## 2025-01-09 RX ADMIN — HYDROMORPHONE HYDROCHLORIDE 2 MG: 1 INJECTION, SOLUTION INTRAMUSCULAR; INTRAVENOUS; SUBCUTANEOUS at 05:01

## 2025-01-09 RX ADMIN — PANTOPRAZOLE SODIUM 40 MG: 40 TABLET, DELAYED RELEASE ORAL at 09:01

## 2025-01-09 RX ADMIN — SUCRALFATE 1 G: 1 TABLET ORAL at 03:01

## 2025-01-09 RX ADMIN — DIPHENHYDRAMINE HYDROCHLORIDE 25 MG: 50 INJECTION, SOLUTION INTRAMUSCULAR; INTRAVENOUS at 01:01

## 2025-01-09 RX ADMIN — HYDROMORPHONE HYDROCHLORIDE 3 MG: 1 INJECTION, SOLUTION INTRAMUSCULAR; INTRAVENOUS; SUBCUTANEOUS at 01:01

## 2025-01-09 RX ADMIN — AMLODIPINE BESYLATE 10 MG: 10 TABLET ORAL at 08:01

## 2025-01-09 RX ADMIN — HYDROMORPHONE HYDROCHLORIDE 3 MG: 1 INJECTION, SOLUTION INTRAMUSCULAR; INTRAVENOUS; SUBCUTANEOUS at 09:01

## 2025-01-09 RX ADMIN — GABAPENTIN 600 MG: 300 CAPSULE ORAL at 08:01

## 2025-01-09 RX ADMIN — GABAPENTIN 600 MG: 300 CAPSULE ORAL at 09:01

## 2025-01-09 RX ADMIN — SENNOSIDES AND DOCUSATE SODIUM 1 TABLET: 50; 8.6 TABLET ORAL at 09:01

## 2025-01-09 RX ADMIN — CEFTRIAXONE 2 G: 2 INJECTION, POWDER, FOR SOLUTION INTRAMUSCULAR; INTRAVENOUS at 12:01

## 2025-01-09 RX ADMIN — METHOCARBAMOL 750 MG: 750 TABLET ORAL at 08:01

## 2025-01-09 RX ADMIN — PANTOPRAZOLE SODIUM 40 MG: 40 TABLET, DELAYED RELEASE ORAL at 08:01

## 2025-01-09 RX ADMIN — ACETAMINOPHEN 975 MG: 325 TABLET ORAL at 08:01

## 2025-01-09 RX ADMIN — APIXABAN 5 MG: 5 TABLET, FILM COATED ORAL at 09:01

## 2025-01-09 RX ADMIN — SUCRALFATE 1 G: 1 TABLET ORAL at 09:01

## 2025-01-09 RX ADMIN — OXYCODONE 15 MG: 5 TABLET ORAL at 01:01

## 2025-01-09 NOTE — SUBJECTIVE & OBJECTIVE
Interval History: Pt seen and examined this morning on thea KINGSLEY.  In more pain after decreasing pain regimen overnight Care plan reviewed. Otherwise, doing well and with no further complaints at this time.        Objective:     Vital Signs (Most Recent):  Temp: 98.2 °F (36.8 °C) (01/09/25 0340)  Pulse: 71 (01/09/25 0340)  Resp: 14 (01/09/25 0526)  BP: 129/81 (01/09/25 0340)  SpO2: 97 % (01/09/25 0340) Vital Signs (24h Range):  Temp:  [98.2 °F (36.8 °C)-98.9 °F (37.2 °C)] 98.2 °F (36.8 °C)  Pulse:  [71-84] 71  Resp:  [14-19] 14  SpO2:  [94 %-100 %] 97 %  BP: (119-153)/(77-86) 129/81     Weight: 93.4 kg (206 lb)  Body mass index is 37.68 kg/m².    Intake/Output Summary (Last 24 hours) at 1/9/2025 0612  Last data filed at 1/9/2025 0600  Gross per 24 hour   Intake 480 ml   Output --   Net 480 ml         Physical Exam  Constitutional:       Appearance: Normal appearance.   HENT:      Head: Normocephalic and atraumatic.      Nose: Nose normal.      Mouth/Throat:      Mouth: Mucous membranes are moist.   Eyes:      Extraocular Movements: Extraocular movements intact.      Pupils: Pupils are equal, round, and reactive to light.   Cardiovascular:      Rate and Rhythm: Normal rate and regular rhythm.      Heart sounds: No murmur heard.     No gallop.   Pulmonary:      Effort: Pulmonary effort is normal.      Breath sounds: Normal breath sounds. No wheezing or rales.   Abdominal:      General: Abdomen is flat. Bowel sounds are normal. There is no distension.      Palpations: Abdomen is soft.      Tenderness: There is no abdominal tenderness.   Musculoskeletal:         General: No swelling or tenderness. Normal range of motion.      Cervical back: Normal range of motion and neck supple.      Right lower leg: No edema.      Left lower leg: No edema.   Skin:     General: Skin is warm and dry.      Capillary Refill: Capillary refill takes less than 2 seconds.      Comments: Scarring present on the right leg    Neurological:       General: No focal deficit present.      Mental Status: She is alert. Mental status is at baseline.   Psychiatric:         Mood and Affect: Mood normal.             Significant Labs: All pertinent labs within the past 24 hours have been reviewed.    Significant Imaging: I have reviewed all pertinent imaging results/findings within the past 24 hours.

## 2025-01-09 NOTE — PROGRESS NOTES
Pharmacokinetic Initial Assessment: IV Vancomycin    Assessment/Plan:    Initiate intravenous vancomycin with loading dose of 2000 mg once followed by a maintenance dose of vancomycin 1500mg IV every 24 hours  Desired empiric serum trough concentration is 15 to 20 mcg/mL  Draw vancomycin trough level 60 min prior to third dose on 01/0/25 at approximately 2200  Pharmacy will continue to follow and monitor vancomycin.      Please contact pharmacy at extension [phone] with any questions regarding this assessment.     Thank you for the consult,   Dario Benson       Patient brief summary:  Nazanin Malone is a 44 y.o. female initiated on antimicrobial therapy with IV Vancomycin for treatment of suspected bacteremia    Drug Allergies:   Review of patient's allergies indicates:   Allergen Reactions    Cat dander Anaphylaxis, Itching and Shortness Of Breath    Nsaids (non-steroidal anti-inflammatory drug) Itching and Anaphylaxis    Latex Rash       Actual Body Weight:   93.4 kg    Renal Function:   Estimated Creatinine Clearance: 68 mL/min (based on SCr of 1.1 mg/dL).,     Dialysis Method (if applicable):  N/A    CBC (last 72 hours):  Recent Labs   Lab Result Units 01/07/25 1934 01/08/25  0621   WBC K/uL 5.98 8.74   Hemoglobin g/dL 9.8* 8.7*   Hematocrit % 27.7* 25.7*   Platelets K/uL 481* 363   Gran % % 46.9 39.8   Lymph % % 44.1 47.6   Mono % % 7.9 9.7   Eosinophil % % 0.3 1.6   Basophil % % 0.5 1.0   Differential Method  Automated Automated       Metabolic Panel (last 72 hours):  Recent Labs   Lab Result Units 01/07/25  1934 01/08/25  0339 01/08/25  0621   Sodium mmol/L 141  --  140   Potassium mmol/L 2.9*  --  3.6   Chloride mmol/L 109  --  107   CO2 mmol/L 20*  --  24   Glucose mg/dL 93  --  109   Glucose, UA   --  Negative  --    BUN mg/dL 8  --  8   Creatinine mg/dL 1.1  --  1.1   Albumin g/dL 4.0  --  3.6   Total Bilirubin mg/dL 0.5  --  0.5   Alkaline Phosphatase U/L 90  --  81   AST U/L 17  --  23   ALT U/L  "10  --  10   Magnesium mg/dL 1.8  --  1.7   Phosphorus mg/dL  --   --  3.1       Drug levels (last 3 results):  No results for input(s): "VANCOMYCINRA", "VANCORANDOM", "VANCOMYCINPE", "VANCOPEAK", "VANCOMYCINTR", "VANCOTROUGH" in the last 72 hours.    Microbiologic Results:  Microbiology Results (last 7 days)       Procedure Component Value Units Date/Time    MRSA/SA Rapid ID by PCR from Blood culture [[id number]] Collected: 01/08/25 2102    Order Status: No result Updated: 01/08/25 2103    Blood culture #2 **CANNOT BE ORDERED STAT** [[id number]] Collected: 01/07/25 1934    Order Status: Completed Specimen: Blood from Peripheral, Hand, Left Updated: 01/08/25 2102     Blood Culture, Routine Gram stain aer bottle: Gram positive cocci in clusters resembling Staph      Results called to and read back by:Belia Moses RN 01/08/2025      21:02    Blood culture #1 **CANNOT BE ORDERED STAT** [[id number]] Collected: 01/07/25 1934    Order Status: Completed Specimen: Blood from Peripheral, Hand, Right Updated: 01/08/25 0315     Blood Culture, Routine No Growth to date    Clostridium difficile EIA [[id number]]     Order Status: No result Specimen: Stool             "

## 2025-01-09 NOTE — NURSING
Patient asleep when I enter the room I wake her to stop her IV ABX she wakes up and is c/o of pain falling asleep asking for pain meds said she is a 10-10 pain but falling asleep with her mouth open.

## 2025-01-09 NOTE — ASSESSMENT & PLAN NOTE
Patient's blood pressure range in the last 24 hours was: BP  Min: 119/81  Max: 153/83.The patient's inpatient anti-hypertensive regimen is listed below:  Current Antihypertensives  amLODIPine tablet 10 mg, Daily, Oral    Plan  - BP is controlled, no changes needed to their regimen

## 2025-01-09 NOTE — ASSESSMENT & PLAN NOTE
Tmax 100.6 in the ED.    Plan  - CTX 2g q24h  -1/4 bottles with GPC-possible contaminant, added Vanc and repeat cultures ordered

## 2025-01-09 NOTE — CONSULTS
VANCOMYCIN DOSING BY PHARMACY DISCONTINUATION NOTE    Nazanin Malone is a 44 y.o. female who had been consulted for vancomycin dosing.    The pharmacy consult for vancomycin dosing has been discontinued.     Vancomycin Dosing by Pharmacy Consult will sign-off. Please reconsult if necessary. Thank you for allowing us to participate in this patient's care.     Dayami Knott, PharmD  Ext. [phone]

## 2025-01-09 NOTE — PROGRESS NOTES
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 She was recently admitted to [facility] [place]/29/24 - 1/5/25 for pain c/w sickle cell pain crises which was refractory to her home regimen of tylenol, oxycodone 15mg, and muscle relaxers. She reported that she moved to the area about 6 weeks ago after living in [place]. She was receiving chronic exchange transfusions there but is not currently receiving them as part of her therapy here. She is seeing a hematologist. She also reports that she previously was on MS contin, pen VK, and hydroxurea as part of her home regimen but has not been put back on those since she has moved here. Of note, during that recent admission, the provider was informed by nursing that the patient asked if she could be given a little more medicine than what was prescribed stating "no one has to know". She also asked if nursing needed some help to throw away an empty flush syringe and an empty med syringe. At that time, she was prescribed Dilaudid 3mg IV q3h PRN and IVF with improvement in her pain.    In the ED, /99 HR 74 RR 20 sats adequate on ambient air. Tmax 100.9. Labs notable for Hgb 9.8 and K 2.9. LA wnl. COVID, Flu, and RSV negative. Blood cultures were obtained. UA was ordered. CXR showed . Received Dilaudid 3mg IV in total, Tylenol 1g, Benadryl 25mg PO, Zofran 4mg IV, " and potassium 25mEq PO.    The patient was admitted to Hospital Medicine for further workup and management.    Overview/Hospital Course:  No notes on file    Interval History: Pt seen and examined this morning on rounds. NAEON.  In more pain after decreasing pain regimen overnight Care plan reviewed. Otherwise, doing well and with no further complaints at this time.        Objective:     Vital Signs (Most Recent):  Temp: 98.2 °F (36.8 °C) (01/09/25 0340)  Pulse: 71 (01/09/25 0340)  Resp: 14 (01/09/25 0526)  BP: 129/81 (01/09/25 0340)  SpO2: 97 % (01/09/25 0340) Vital Signs (24h Range):  Temp:  [98.2 °F (36.8 °C)-98.9 °F (37.2 °C)] 98.2 °F (36.8 °C)  Pulse:  [71-84] 71  Resp:  [14-19] 14  SpO2:  [94 %-100 %] 97 %  BP: (119-153)/(77-86) 129/81     Weight: 93.4 kg (206 lb)  Body mass index is 37.68 kg/m².    Intake/Output Summary (Last 24 hours) at 1/9/2025 0612  Last data filed at 1/9/2025 0600  Gross per 24 hour   Intake 480 ml   Output --   Net 480 ml         Physical Exam  Constitutional:       Appearance: Normal appearance.   HENT:      Head: Normocephalic and atraumatic.      Nose: Nose normal.      Mouth/Throat:      Mouth: Mucous membranes are moist.   Eyes:      Extraocular Movements: Extraocular movements intact.      Pupils: Pupils are equal, round, and reactive to light.   Cardiovascular:      Rate and Rhythm: Normal rate and regular rhythm.      Heart sounds: No murmur heard.     No gallop.   Pulmonary:      Effort: Pulmonary effort is normal.      Breath sounds: Normal breath sounds. No wheezing or rales.   Abdominal:      General: Abdomen is flat. Bowel sounds are normal. There is no distension.      Palpations: Abdomen is soft.      Tenderness: There is no abdominal tenderness.   Musculoskeletal:         General: No swelling or tenderness. Normal range of motion.      Cervical back: Normal range of motion and neck supple.      Right lower leg: No edema.      Left lower leg: No edema.   Skin:      General: Skin is warm and dry.      Capillary Refill: Capillary refill takes less than 2 seconds.      Comments: Scarring present on the right leg    Neurological:      General: No focal deficit present.      Mental Status: She is alert. Mental status is at baseline.   Psychiatric:         Mood and Affect: Mood normal.             Significant Labs: All pertinent labs within the past 24 hours have been reviewed.    Significant Imaging: I have reviewed all pertinent imaging results/findings within the past 24 hours.    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 She was recently admitted to [facility] 12/29/24 - 1/5/25 for a pain crisis.  CXR without focal consolidation. I am not concerned for acute chest at this time.    Plan  - Multimodal pain regimen: scheduled tylenol, robaxin, and oxycodone   - Dilaudid 3mg IV q3h prn  - mIVF  - Folic acid, hydroxyurea and other home SCD medications reordered  - Repeat CXR for increased O2 requirement    Fever  Tmax 100.6 in the ED.    Plan  - CTX 2g q24h  -1/4 bottles with GPC, added Vanc and repeat cultures ordered       Diarrhea  Reported for the past day.    Plan  - Follow up C. diff      Chronic anticoagulation  See History of PE      History of pulmonary embolism  Chronic and stable.    Plan  - Continue home Eliquis  - Consider CTA for pleuritic pain and hypoxia      Hypertension  Patient's blood pressure range in the last 24 hours was: BP  Min: 119/81  Max: 153/83.The patient's inpatient anti-hypertensive regimen is listed below:  Current Antihypertensives  amLODIPine tablet 10 mg, Daily, Oral    Plan  - BP is controlled, no changes needed to their  regimen    Intermittent asthma  Chronic and stable. Not in respiratory distress.    Plan  - Continue home albuterol PRN      Chronic prescription opiate use  Uses  tylenol, oxycodone 15mg, and muscle relaxers. Pain has been refractory to these due to reported emesis.    Plan  - See Vaso-occlusive pain      Chronic gastritis  Chronic and stable.    Plan  - Continue home PPI and carafate        VTE Risk Mitigation (From admission, onward)           Ordered     apixaban tablet 5 mg  2 times daily         01/07/25 2326     IP VTE HIGH RISK PATIENT  Once         01/07/25 2326     Place sequential compression device  Until discontinued         01/07/25 2326                    Discharge Planning   ALYSE: 1/10/2025     Code Status: Full Code   Medical Readiness for Discharge Date:   Discharge Plan A: Home, Home with family                        Deb Quiroz MD  Department of Hospital Medicine   [facility]

## 2025-01-09 NOTE — NURSING
Spoke to on call MD about patient behavior regarding pain meds.. I was busy and she called for pain meds the OC Rodolfo went in and give her the meds she was falling asleep while he was giving the meds she was trying to talk but falling asleep mid sentence.

## 2025-01-09 NOTE — PLAN OF CARE
Problem: Adult Inpatient Plan of Care  Goal: Plan of Care Review  Outcome: Not Progressing  Goal: Patient-Specific Goal (Individualized)  Outcome: Not Progressing  Goal: Absence of Hospital-Acquired Illness or Injury  Outcome: Not Progressing  Goal: Optimal Comfort and Wellbeing  Outcome: Not Progressing  Goal: Readiness for Transition of Care  Outcome: Not Progressing     Problem: Sickle Cell Disease  Goal: Optimal Cerebral Tissue Perfusion  Outcome: Not Progressing  Goal: Optimal Coping with Sickle Cell Disease  Outcome: Not Progressing  Goal: Effective Tissue Perfusion  Outcome: Not Progressing  Goal: Absence of Infection Signs and Symptoms  Outcome: Not Progressing  Goal: Optimal Pain Control and Function  Outcome: Not Progressing  Goal: Optimal Oxygenation  Outcome: Not Progressing     Problem: Infection  Goal: Absence of Infection Signs and Symptoms  Outcome: Not Progressing     Problem: Fall Injury Risk  Goal: Absence of Fall and Fall-Related Injury  Outcome: Not Progressing     Problem: Pain Chronic (Persistent)  Goal: Optimal Pain Control and Function  Outcome: Not Progressing

## 2025-01-09 NOTE — ASSESSMENT & PLAN NOTE
44-year-old female with sickle cell beta thalassemia zero, multiple pulmonary emboli on chronic AC, asthma, and HTN who [place] [facility] [place]25 for diffuse myalgias and pain consistent with a sickle cell pain crisis as well as fever, nausea, vomiting, and diarrhea for the past day. She was recently [place] [facility] [place] 1/5/25 for a pain crisis.  CXR without focal consolidation. I am not concerned for acute chest at this time.    Plan  - Multimodal pain regimen: scheduled tylenol, robaxin, and oxycodone PRN   - Dilaudid 3mg IV q3h prn  - mIVF  - Folic acid, hydroxyurea and other home SCD medications reordered  - Repeat CXR for increased O2 requirement

## 2025-01-10 PROBLEM — R78.81 BACTEREMIA DUE TO STAPHYLOCOCCUS EPIDERMIDIS: Status: ACTIVE | Noted: 2025-01-10

## 2025-01-10 PROBLEM — B95.7 BACTEREMIA DUE TO STAPHYLOCOCCUS EPIDERMIDIS: Status: ACTIVE | Noted: 2025-01-10

## 2025-01-10 LAB
ALBUMIN SERPL BCP-MCNC: 3.4 G/DL (ref 3.5–5.2)
ALP SERPL-CCNC: 80 U/L (ref 40–150)
ALT SERPL W/O P-5'-P-CCNC: 18 U/L (ref 10–44)
ANION GAP SERPL CALC-SCNC: 8 MMOL/L (ref 8–16)
ASCENDING AORTA: 3.46 CM
AST SERPL-CCNC: 31 U/L (ref 10–40)
AV AREA BY CONTINUOUS VTI: 2.7 CM2
AV INDEX (PROSTH): 0.67
AV LVOT MEAN GRADIENT: 2 MMHG
AV LVOT PEAK GRADIENT: 4 MMHG
AV MEAN GRADIENT: 7.3 MMHG
AV PEAK GRADIENT: 14.4 MMHG
AV VALVE AREA BY VELOCITY RATIO: 2.2 CM²
AV VALVE AREA: 2.8 CM2
AV VELOCITY RATIO: 0.53
BASOPHILS # BLD AUTO: 0.07 K/UL (ref 0–0.2)
BASOPHILS NFR BLD: 1 % (ref 0–1.9)
BILIRUB SERPL-MCNC: 0.2 MG/DL (ref 0.1–1)
BUN SERPL-MCNC: 14 MG/DL (ref 6–20)
CALCIUM SERPL-MCNC: 9.1 MG/DL (ref 8.7–10.5)
CHLORIDE SERPL-SCNC: 108 MMOL/L (ref 95–110)
CO2 SERPL-SCNC: 22 MMOL/L (ref 23–29)
CREAT SERPL-MCNC: 1.1 MG/DL (ref 0.5–1.4)
CV ECHO LV RWT: 0.39 CM
DIFFERENTIAL METHOD BLD: ABNORMAL
DOP CALC AO PEAK VEL: 1.9 M/S
DOP CALC AO VTI: 35.4 CM
DOP CALC LVOT AREA: 4.2 CM2
DOP CALC LVOT DIAMETER: 2.3 CM
DOP CALC LVOT PEAK VEL: 1 M/S
DOP CALC LVOT STROKE VOLUME: 98 CM3
DOP CALCLVOT PEAK VEL VTI: 23.6 CM
E WAVE DECELERATION TIME: 231.75 MS
E/A RATIO: 0.84
E/E' RATIO: 10.92 M/S
ECHO EF ESTIMATED: 64 %
ECHO LV POSTERIOR WALL: 1.1 CM (ref 0.6–1.1)
EJECTION FRACTION: 63 %
EOSINOPHIL # BLD AUTO: 0.3 K/UL (ref 0–0.5)
EOSINOPHIL NFR BLD: 4.7 % (ref 0–8)
ERYTHROCYTE [DISTWIDTH] IN BLOOD BY AUTOMATED COUNT: 22.3 % (ref 11.5–14.5)
EST. GFR  (NO RACE VARIABLE): >60 ML/MIN/1.73 M^2
FRACTIONAL SHORTENING: 35.1 % (ref 28–44)
GLUCOSE SERPL-MCNC: 73 MG/DL (ref 70–110)
HCT VFR BLD AUTO: 28.1 % (ref 37–48.5)
HGB BLD-MCNC: 8.7 G/DL (ref 12–16)
IMM GRANULOCYTES # BLD AUTO: 0.02 K/UL (ref 0–0.04)
IMM GRANULOCYTES NFR BLD AUTO: 0.3 % (ref 0–0.5)
INTERVENTRICULAR SEPTUM: 1.1 CM (ref 0.6–1.1)
LA MAJOR: 5.2 CM
LA MINOR: 4.98 CM
LA WIDTH: 3.79 CM
LEFT ATRIUM SIZE: 3.27 CM
LEFT ATRIUM VOLUME INDEX MOD: 40.2 ML/M2
LEFT ATRIUM VOLUME INDEX: 27.6 ML/M2
LEFT ATRIUM VOLUME MOD: 77.96 ML
LEFT ATRIUM VOLUME: 53.59 CM3
LEFT INTERNAL DIMENSION IN SYSTOLE: 3.7 CM (ref 2.1–4)
LEFT VENTRICLE DIASTOLIC VOLUME INDEX: 82.42 ML/M2
LEFT VENTRICLE DIASTOLIC VOLUME: 159.9 ML
LEFT VENTRICLE MASS INDEX: 132.3 G/M2
LEFT VENTRICLE SYSTOLIC VOLUME INDEX: 29.6 ML/M2
LEFT VENTRICLE SYSTOLIC VOLUME: 57.46 ML
LEFT VENTRICULAR INTERNAL DIMENSION IN DIASTOLE: 5.7 CM (ref 3.5–6)
LEFT VENTRICULAR MASS: 256.7 G
LV LATERAL E/E' RATIO: 10.14
LV SEPTAL E/E' RATIO: 11.83
LYMPHOCYTES # BLD AUTO: 3.4 K/UL (ref 1–4.8)
LYMPHOCYTES NFR BLD: 49.7 % (ref 18–48)
MAGNESIUM SERPL-MCNC: 1.8 MG/DL (ref 1.6–2.6)
MCH RBC QN AUTO: 21.5 PG (ref 27–31)
MCHC RBC AUTO-ENTMCNC: 31 G/DL (ref 32–36)
MCV RBC AUTO: 69 FL (ref 82–98)
MONOCYTES # BLD AUTO: 0.7 K/UL (ref 0.3–1)
MONOCYTES NFR BLD: 9.8 % (ref 4–15)
MV A" WAVE DURATION": 62.8 MS
MV PEAK A VEL: 0.85 M/S
MV PEAK E VEL: 0.71 M/S
NEUTROPHILS # BLD AUTO: 2.4 K/UL (ref 1.8–7.7)
NEUTROPHILS NFR BLD: 34.5 % (ref 38–73)
NRBC BLD-RTO: 2 /100 WBC
OHS CV RV/LV RATIO: 0.53 CM
OHS QRS DURATION: 98 MS
OHS QTC CALCULATION: 463 MS
PHOSPHATE SERPL-MCNC: 3.8 MG/DL (ref 2.7–4.5)
PLATELET # BLD AUTO: 286 K/UL (ref 150–450)
PMV BLD AUTO: 9.5 FL (ref 9.2–12.9)
POTASSIUM SERPL-SCNC: 4.1 MMOL/L (ref 3.5–5.1)
PROT SERPL-MCNC: 7.5 G/DL (ref 6–8.4)
PULM VEIN A" WAVE DURATION": 62.8 MS
PULM VEIN S/D RATIO: 2.38
PULMONIC VEIN PEAK A VELOCITY: 0.2 M/S
PV PEAK D VEL: 0.32 M/S
PV PEAK S VEL: 0.76 M/S
RA MAJOR: 3.71 CM
RA PRESSURE ESTIMATED: 3 MMHG
RA WIDTH: 2.43 CM
RBC # BLD AUTO: 4.05 M/UL (ref 4–5.4)
RIGHT ATRIAL AREA: 10.4 CM2
RIGHT VENTRICLE DIASTOLIC BASEL DIMENSION: 3 CM
RV TISSUE DOPPLER FREE WALL SYSTOLIC VELOCITY 1 (APICAL 4 CHAMBER VIEW): 17.35 CM/S
SINUS: 3.52 CM
SODIUM SERPL-SCNC: 138 MMOL/L (ref 136–145)
STJ: 3.14 CM
TDI LATERAL: 0.07 M/S
TDI SEPTAL: 0.06 M/S
TDI: 0.07 M/S
TRICUSPID ANNULAR PLANE SYSTOLIC EXCURSION: 1.95 CM
TROPONIN I SERPL DL<=0.01 NG/ML-MCNC: <3 NG/L (ref 0–14)
WBC # BLD AUTO: 6.86 K/UL (ref 3.9–12.7)
Z-SCORE OF LEFT VENTRICULAR DIMENSION IN END DIASTOLE: 0.34
Z-SCORE OF LEFT VENTRICULAR DIMENSION IN END SYSTOLE: 0.69

## 2025-01-10 PROCEDURE — 63600175 PHARM REV CODE 636 W HCPCS: Performed by: INTERNAL MEDICINE

## 2025-01-10 PROCEDURE — 84484 ASSAY OF TROPONIN QUANT: CPT | Performed by: STUDENT IN AN ORGANIZED HEALTH CARE EDUCATION/TRAINING PROGRAM

## 2025-01-10 PROCEDURE — 80053 COMPREHEN METABOLIC PANEL: CPT

## 2025-01-10 PROCEDURE — 25000003 PHARM REV CODE 250

## 2025-01-10 PROCEDURE — 99223 1ST HOSP IP/OBS HIGH 75: CPT | Mod: GC,,, | Performed by: STUDENT IN AN ORGANIZED HEALTH CARE EDUCATION/TRAINING PROGRAM

## 2025-01-10 PROCEDURE — 25000003 PHARM REV CODE 250: Performed by: STUDENT IN AN ORGANIZED HEALTH CARE EDUCATION/TRAINING PROGRAM

## 2025-01-10 PROCEDURE — 63600175 PHARM REV CODE 636 W HCPCS: Performed by: STUDENT IN AN ORGANIZED HEALTH CARE EDUCATION/TRAINING PROGRAM

## 2025-01-10 PROCEDURE — 36415 COLL VENOUS BLD VENIPUNCTURE: CPT

## 2025-01-10 PROCEDURE — 93010 ELECTROCARDIOGRAM REPORT: CPT | Mod: ,,, | Performed by: INTERNAL MEDICINE

## 2025-01-10 PROCEDURE — 93005 ELECTROCARDIOGRAM TRACING: CPT

## 2025-01-10 PROCEDURE — 11000001 HC ACUTE MED/SURG PRIVATE ROOM

## 2025-01-10 PROCEDURE — 85025 COMPLETE CBC W/AUTO DIFF WBC: CPT

## 2025-01-10 PROCEDURE — 36415 COLL VENOUS BLD VENIPUNCTURE: CPT | Performed by: STUDENT IN AN ORGANIZED HEALTH CARE EDUCATION/TRAINING PROGRAM

## 2025-01-10 PROCEDURE — 84100 ASSAY OF PHOSPHORUS: CPT

## 2025-01-10 PROCEDURE — 83735 ASSAY OF MAGNESIUM: CPT

## 2025-01-10 RX ORDER — DIPHENHYDRAMINE HYDROCHLORIDE 50 MG/ML
25 INJECTION INTRAMUSCULAR; INTRAVENOUS ONCE
Status: DISCONTINUED | OUTPATIENT
Start: 2025-01-10 | End: 2025-01-10

## 2025-01-10 RX ORDER — DIPHENHYDRAMINE HYDROCHLORIDE 50 MG/ML
50 INJECTION INTRAMUSCULAR; INTRAVENOUS ONCE
Status: DISCONTINUED | OUTPATIENT
Start: 2025-01-10 | End: 2025-01-10

## 2025-01-10 RX ORDER — MUPIROCIN 20 MG/G
OINTMENT TOPICAL 2 TIMES DAILY
Status: DISPENSED | OUTPATIENT
Start: 2025-01-10 | End: 2025-01-15

## 2025-01-10 RX ORDER — DIPHENHYDRAMINE HYDROCHLORIDE 50 MG/ML
25 INJECTION INTRAMUSCULAR; INTRAVENOUS ONCE
Status: COMPLETED | OUTPATIENT
Start: 2025-01-10 | End: 2025-01-10

## 2025-01-10 RX ORDER — LORAZEPAM 0.5 MG/1
0.5 TABLET ORAL ONCE
Status: COMPLETED | OUTPATIENT
Start: 2025-01-10 | End: 2025-01-10

## 2025-01-10 RX ORDER — LIDOCAINE HYDROCHLORIDE 20 MG/ML
1 INJECTION, SOLUTION INFILTRATION; PERINEURAL ONCE
Status: DISCONTINUED | OUTPATIENT
Start: 2025-01-10 | End: 2025-01-16

## 2025-01-10 RX ADMIN — FOLIC ACID 1 MG: 1 TABLET ORAL at 08:01

## 2025-01-10 RX ADMIN — SUCRALFATE 1 G: 1 TABLET ORAL at 11:01

## 2025-01-10 RX ADMIN — METHOCARBAMOL 750 MG: 750 TABLET ORAL at 08:01

## 2025-01-10 RX ADMIN — AMLODIPINE BESYLATE 10 MG: 10 TABLET ORAL at 08:01

## 2025-01-10 RX ADMIN — PANTOPRAZOLE SODIUM 40 MG: 40 TABLET, DELAYED RELEASE ORAL at 08:01

## 2025-01-10 RX ADMIN — OXYCODONE 15 MG: 5 TABLET ORAL at 01:01

## 2025-01-10 RX ADMIN — SUCRALFATE 1 G: 1 TABLET ORAL at 08:01

## 2025-01-10 RX ADMIN — SUCRALFATE 1 G: 1 TABLET ORAL at 04:01

## 2025-01-10 RX ADMIN — HYDROMORPHONE HYDROCHLORIDE 3 MG: 1 INJECTION, SOLUTION INTRAMUSCULAR; INTRAVENOUS; SUBCUTANEOUS at 06:01

## 2025-01-10 RX ADMIN — HYDROMORPHONE HYDROCHLORIDE 3 MG: 1 INJECTION, SOLUTION INTRAMUSCULAR; INTRAVENOUS; SUBCUTANEOUS at 11:01

## 2025-01-10 RX ADMIN — SUCRALFATE 1 G: 1 TABLET ORAL at 06:01

## 2025-01-10 RX ADMIN — SENNOSIDES AND DOCUSATE SODIUM 1 TABLET: 50; 8.6 TABLET ORAL at 08:01

## 2025-01-10 RX ADMIN — DIPHENHYDRAMINE HYDROCHLORIDE 25 MG: 50 INJECTION, SOLUTION INTRAMUSCULAR; INTRAVENOUS at 11:01

## 2025-01-10 RX ADMIN — CEFTRIAXONE 2 G: 2 INJECTION, POWDER, FOR SOLUTION INTRAMUSCULAR; INTRAVENOUS at 02:01

## 2025-01-10 RX ADMIN — HYDROMORPHONE HYDROCHLORIDE 3 MG: 1 INJECTION, SOLUTION INTRAMUSCULAR; INTRAVENOUS; SUBCUTANEOUS at 03:01

## 2025-01-10 RX ADMIN — OXYCODONE 15 MG: 5 TABLET ORAL at 09:01

## 2025-01-10 RX ADMIN — ACETAMINOPHEN 975 MG: 325 TABLET ORAL at 08:01

## 2025-01-10 RX ADMIN — GABAPENTIN 600 MG: 300 CAPSULE ORAL at 08:01

## 2025-01-10 RX ADMIN — HYDROMORPHONE HYDROCHLORIDE 3 MG: 1 INJECTION, SOLUTION INTRAMUSCULAR; INTRAVENOUS; SUBCUTANEOUS at 02:01

## 2025-01-10 RX ADMIN — VANCOMYCIN HYDROCHLORIDE 2250 MG: 1.25 INJECTION, POWDER, LYOPHILIZED, FOR SOLUTION INTRAVENOUS at 12:01

## 2025-01-10 RX ADMIN — HYDROXYUREA 1000 MG: 500 CAPSULE ORAL at 08:01

## 2025-01-10 RX ADMIN — HYDROMORPHONE HYDROCHLORIDE 3 MG: 1 INJECTION, SOLUTION INTRAMUSCULAR; INTRAVENOUS; SUBCUTANEOUS at 07:01

## 2025-01-10 RX ADMIN — DIPHENHYDRAMINE HYDROCHLORIDE 25 MG: 50 INJECTION, SOLUTION INTRAMUSCULAR; INTRAVENOUS at 02:01

## 2025-01-10 RX ADMIN — METHOCARBAMOL 750 MG: 750 TABLET ORAL at 03:01

## 2025-01-10 RX ADMIN — GABAPENTIN 600 MG: 300 CAPSULE ORAL at 03:01

## 2025-01-10 RX ADMIN — DIPHENHYDRAMINE HYDROCHLORIDE 25 MG: 50 INJECTION, SOLUTION INTRAMUSCULAR; INTRAVENOUS at 06:01

## 2025-01-10 RX ADMIN — DIPHENHYDRAMINE HYDROCHLORIDE 25 MG: 50 INJECTION, SOLUTION INTRAMUSCULAR; INTRAVENOUS at 07:01

## 2025-01-10 RX ADMIN — DIPHENHYDRAMINE HYDROCHLORIDE 25 MG: 50 INJECTION, SOLUTION INTRAMUSCULAR; INTRAVENOUS at 03:01

## 2025-01-10 RX ADMIN — LORAZEPAM 0.5 MG: 0.5 TABLET ORAL at 05:01

## 2025-01-10 RX ADMIN — APIXABAN 5 MG: 5 TABLET, FILM COATED ORAL at 08:01

## 2025-01-10 RX ADMIN — MUPIROCIN: 20 OINTMENT TOPICAL at 09:01

## 2025-01-10 RX ADMIN — DIPHENHYDRAMINE HYDROCHLORIDE 25 MG: 50 INJECTION, SOLUTION INTRAMUSCULAR; INTRAVENOUS at 12:01

## 2025-01-10 NOTE — PROGRESS NOTES
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 She was recently admitted to [facility] 12/29/24 - 1/5/25 for pain c/w sickle cell pain crises which was refractory to her home regimen of tylenol, oxycodone 15mg, and muscle relaxers. She reported that she moved to the area about 6 weeks ago after living in [place]. She was receiving chronic exchange transfusions there but is not currently receiving them as part of her therapy here. She is seeing a hematologist. She also reports that she previously was on MS contin, pen VK, and hydroxurea as part of her home regimen but has not been put back on those since she has moved here. Of note, during that recent admission, the provider was informed by nursing that the patient asked if she could be given a little more medicine than what was prescribed stating "no one has to know". She also asked if nursing needed some help to throw away an empty flush syringe and an empty med syringe. At that time, she was prescribed Dilaudid 3mg IV q3h PRN and IVF with improvement in her pain.    In the ED, /99 HR 74 RR 20 sats adequate on ambient air. Tmax 100.9. Labs notable for Hgb 9.8 and K 2.9. LA wnl. COVID, Flu, and RSV negative. Blood cultures were obtained. UA was ordered. CXR showed . Received Dilaudid 3mg IV in total, Tylenol 1g, Benadryl 25mg PO, Zofran 4mg IV, " and potassium 25mEq PO.    The patient was admitted to Hospital Medicine for further workup and management.    Overview/Hospital Course:  No notes on file    Interval History: Pt seen and examined this morning on rounds. SANCHO. Still complaining of pain. Care plan reviewed. Otherwise, doing well and with no further complaints at this time.      Objective:     Vital Signs (Most Recent):  Temp: 98.4 °F (36.9 °C) (01/10/25 0724)  Pulse: 80 (01/10/25 0724)  Resp: 18 (01/10/25 0724)  BP: 113/78 (01/10/25 0724)  SpO2: (!) 94 % (01/10/25 0724) Vital Signs (24h Range):  Temp:  [98.4 °F (36.9 °C)-99.1 °F (37.3 °C)] 98.4 °F (36.9 °C)  Pulse:  [] 80  Resp:  [10-18] 18  SpO2:  [94 %-99 %] 94 %  BP: (113-160)/() 113/78     Weight: 93.4 kg (206 lb)  Body mass index is 37.68 kg/m².  No intake or output data in the 24 hours ending 01/10/25 0733      Physical Exam  Constitutional:       Appearance: Normal appearance.   HENT:      Head: Normocephalic and atraumatic.      Nose: Nose normal.      Mouth/Throat:      Mouth: Mucous membranes are moist.   Eyes:      Extraocular Movements: Extraocular movements intact.      Pupils: Pupils are equal, round, and reactive to light.   Cardiovascular:      Rate and Rhythm: Normal rate and regular rhythm.      Heart sounds: No murmur heard.     No gallop.   Pulmonary:      Effort: Pulmonary effort is normal.      Breath sounds: Normal breath sounds. No wheezing or rales.   Abdominal:      General: Abdomen is flat. Bowel sounds are normal. There is no distension.      Palpations: Abdomen is soft.      Tenderness: There is no abdominal tenderness.   Musculoskeletal:         General: No swelling or tenderness. Normal range of motion.      Cervical back: Normal range of motion and neck supple.      Right lower leg: No edema.      Left lower leg: No edema.   Skin:     General: Skin is warm and dry.      Capillary Refill: Capillary refill takes less than 2 seconds.      Comments: Scarring  present on the right leg    Neurological:      General: No focal deficit present.      Mental Status: She is alert. Mental status is at baseline.   Psychiatric:         Mood and Affect: Mood normal.             Significant Labs: All pertinent labs within the past 24 hours have been reviewed.    Significant Imaging: I have reviewed all pertinent imaging results/findings within the past 24 hours.    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 She was recently admitted to [facility] 12/29/24 - 1/5/25 for a pain crisis.  CXR without focal consolidation. I am not concerned for acute chest at this time.    Plan  - Multimodal pain regimen: scheduled tylenol, robaxin, and oxycodone PRN   - Dilaudid 3mg IV q3h prn  - mIVF  - Folic acid, hydroxyurea and other home SCD medications reordered  - Repeat CXR for increased O2 requirement    Bacteremia due to Staphylococcus epidermidis  -blood cultures with staph epidermis  -possible contaminant but given patient has a port will place ID consult for further recs  -discussed with ID will order TTE   -initially Vancomycin held, restarted and Rocephin stopped per recommendations   -will need port removal surgery consult placed.       Fever  Tmax 100.6 in the ED.    Plan  - CTX 2g q24h  -1/4 bottles with GPC-possible contaminant, added Vanc but held after results and repeat cultures ordered   -see bacteremia plan       Diarrhea  Reported for the past day.    Plan  - Follow up C. diff      Chronic anticoagulation  See History of PE      History of pulmonary embolism  Chronic and stable.    Plan  - Continue home Eliquis  - Consider CTA for pleuritic pain and  hypoxia      Hypertension  Patient's blood pressure range in the last 24 hours was: BP  Min: 119/81  Max: 153/83.The patient's inpatient anti-hypertensive regimen is listed below:  Current Antihypertensives  amLODIPine tablet 10 mg, Daily, Oral    Plan  - BP is controlled, no changes needed to their regimen    Intermittent asthma  Chronic and stable. Not in respiratory distress.    Plan  - Continue home albuterol PRN      Chronic prescription opiate use  Uses  tylenol, oxycodone 15mg, and muscle relaxers. Pain has been refractory to these due to reported emesis.    Plan  - See Vaso-occlusive pain      Chronic gastritis  Chronic and stable.    Plan  - Continue home PPI and carafate        VTE Risk Mitigation (From admission, onward)           Ordered     apixaban tablet 5 mg  2 times daily         01/07/25 2326     IP VTE HIGH RISK PATIENT  Once         01/07/25 2326     Place sequential compression device  Until discontinued         01/07/25 2326                    Discharge Planning   ALYSE: 1/12/2025     Code Status: Full Code   Medical Readiness for Discharge Date:   Discharge Plan A: Home, Home with family                        Deb Quiroz MD  Department of Hospital Medicine   [facility]

## 2025-01-10 NOTE — CONSULTS
[facility]  Infectious Disease  Consult Note    Patient Name: Nazanin Malone  MRN: [id number]  Admission Date: 1/7/2025  Hospital Length of Stay: 2 days  Attending Physician: Deb Quiroz MD  Primary Care Provider: Kezia, Primary Doctor     Isolation Status: No active isolations    Patient information was obtained from patient, past medical records, and ER records.      Inpatient consult to Infectious Diseases  Consult performed by: Marie Man MD  Consult ordered by: Deb Quiroz MD        Assessment/Plan:     ID  Bacteremia due to Staphylococcus epidermidis  CoNS (methicillin sensitive) identified BCX 1/7. Started on vancomycin 1/8, has been on CTX 2g q24h since 1/7. No new murmur. Per patient, noted that port site occasionally develops welts/lesions around it with concurrent erythema, though port site today appears c/d/I with no induration. Afebrile since 1/8. CoNS speciation with susceptibilities pending, will need to treat with Vancomycin until then. Given patient description of skin lesions/erythema around port PTA with Tmax 102 at home, favor removing port and allowing for line holiday prior to consideration of replacement    - Stop CTX  - Continue Vancomycin, goal trough 10-15  - Recommend IR consultation for port removal  - F/u CoNS susceptibilities and repeat BCX  - Plan discussed with primary         Thank you for your consult. I will follow-up with patient. Please contact us if you have any additional questions.    Marie Man MD  Infectious Disease  [facility]    Subjective:     Principal Problem: Vaso-occlusive pain due to sickle cell disease    HPI: Nazanin Malone is a 44F with sickle cell beta thalassemia zero, multiple pulmonary emboli on chronic AC, asthma, and HTN who presented to [facility] 1/7/25 for diffuse myalgias and pain consistent with a sickle cell pain crisis as well as fever, nausea, vomiting, and diarrhea for the past day. Since admission,  normotensive with Tmax 100.9 on , however afebrile since . Labs notable for Hgb 9.8 and K 2.9. CXR NAIPP. COVID, Flu, and RSV negative. Blood cultures were obtained  which grew CoNS in both sets. BCX recollected . Patient states continued subjective fever/chills. Vomiting and diarrhea have improved. She strongly prefers to retain her port (in place for 2yr, no recent exchange). No focal joint pain or spinal pain. ID consulted for CoNS bacteremia in setting of port.     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    Asthma     Avascular necrosis of femur     Depression     History of pulmonary embolism     Hypertension     Morbid obesity     Opioid dependence 2017    Pneumonia due to Streptococcus pneumoniae 2017    Right lower lobe pneumonia 2017    Sepsis due to Streptococcus pneumoniae 2017    Sickle cell-beta thalassemia disease with pain     Trigeminal neuralgia        Past Surgical History:   Procedure Laterality Date     SECTION      ESOPHAGOGASTRODUODENOSCOPY N/A 2024    Procedure: EGD (ESOPHAGOGASTRODUODENOSCOPY);  Surgeon: Allen Marshall MD;  Location: [address]);  Service: Gastroenterology;  Laterality: N/A;    TONSILLECTOMY      TUBAL LIGATION         Review of patient's allergies indicates:   Allergen Reactions    Cat dander Anaphylaxis, Itching and Shortness Of Breath    Nsaids (non-steroidal anti-inflammatory drug) Itching and Anaphylaxis    Latex Rash       Medications:  Medications Prior to Admission   Medication Sig    acetaminophen (TYLENOL) 500 MG tablet Take 1,000 mg by mouth every 8 (eight) hours as needed for Pain.    albuterol (VENTOLIN HFA) 90 mcg/actuation inhaler Inhale 2 puffs into the lungs every 6 (six) hours as needed for Wheezing or Shortness of Breath. Rescue    amLODIPine (NORVASC) 10 MG tablet Take 1 tablet (10 mg  total) by mouth once daily.    apixaban (ELIQUIS) 5 mg Tab Take 1 tablet (5 mg total) by mouth 2 (two) times daily.    ascorbic acid (VITAMIN C ORAL) Take 1 capsule by mouth once daily.    FLUoxetine 40 MG capsule Take 1 capsule (40 mg total) by mouth once daily.    fluticasone propionate (FLONASE) 50 mcg/actuation nasal spray INSTILL 1 SPRAY INTO EACH NOSTRIL EVERY DAY    folic acid (FOLVITE) 1 MG tablet Take 1 tablet (1 mg total) by mouth once daily.    gabapentin (NEURONTIN) 300 MG capsule Take 2 capsules (600 mg total) by mouth 3 (three) times daily.    hydroxyurea (HYDREA) 500 mg Cap Take 2 capsules (1,000 mg total) by mouth once daily.    hydrOXYzine pamoate (VISTARIL) 25 MG Cap Take 1 capsule (25 mg total) by mouth every 4 (four) hours as needed (Anxiety).    LORazepam (ATIVAN) 0.5 MG tablet Take 2 tablets (1 mg total) by mouth every 8 (eight) hours as needed for Anxiety.    metoclopramide HCl (REGLAN) 5 MG tablet Take 1 tablet (5 mg total) by mouth 3 (three) times daily before meals.    oxyCODONE (ROXICODONE) 15 MG Tab Take 1 tablet (15 mg total) by mouth every 4 (four) hours as needed for Pain.    pantoprazole (PROTONIX) 40 MG tablet Take 1 tablet (40 mg total) by mouth 2 (two) times daily.    promethazine (PHENERGAN) 12.5 MG Tab Take 2 tablets (25 mg total) by mouth 4 (four) times daily.    senna-docusate 8.6-50 mg (PERICOLACE) 8.6-50 mg per tablet Take 1 tablet by mouth daily as needed for Constipation.    sucralfate (CARAFATE) 1 gram tablet Take 1 tablet (1 g total) by mouth 4 (four) times daily before meals and nightly.    tiZANidine (ZANAFLEX) 2 MG tablet Take 2 tablets (4 mg total) by mouth every 8 (eight) hours as needed.     Antibiotics (From admission, onward)      Start     Stop Route Frequency Ordered    01/09/25 0115  cefTRIAXone injection 2 g         -- IV Every 24 hours (non-standard times) 01/08/25 1302          Antifungals (From admission, onward)      None          Antivirals (From  admission, onward)      None             Immunization History   Administered Date(s) Administered    HiB PRP-T 06/23/2017    Meningococcal Conjugate (MCV4P) 06/23/2017    Pneumococcal Conjugate - 13 Valent 06/23/2017       Family History       Problem Relation (Age of Onset)    Diabetes Mother    Heart disease Mother, Father          Social History     Socioeconomic History    Marital status: Single   Tobacco Use    Smoking status: Never    Smokeless tobacco: Never   Substance and Sexual Activity    Alcohol use: No    Drug use: Yes     Types: Marijuana     Comment: periodically     Sexual activity: Not Currently     Birth control/protection: See Surgical Hx     Social Drivers of Health     Financial Resource Strain: Low Risk  (1/8/2025)    Overall Financial Resource Strain (CARDIA)     Difficulty of Paying Living Expenses: Not very hard   Recent Concern: Financial Resource Strain - High Risk (12/11/2024)    Overall Financial Resource Strain (CARDIA)     Difficulty of Paying Living Expenses: Very hard   Food Insecurity: No Food Insecurity (1/8/2025)    Hunger Vital Sign     Worried About Running Out of Food in the Last Year: Never true     Ran Out of Food in the Last Year: Never true   Recent Concern: Food Insecurity - Food Insecurity Present (12/11/2024)    Hunger Vital Sign     Worried About Running Out of Food in the Last Year: Often true     Ran Out of Food in the Last Year: Often true   Transportation Needs: No Transportation Needs (1/8/2025)    TRANSPORTATION NEEDS     Transportation : No   Physical Activity: Inactive (1/8/2025)    Exercise Vital Sign     Days of Exercise per Week: 0 days     Minutes of Exercise per Session: 0 min   Stress: No Stress Concern Present (1/8/2025)    [employer] - Occupational Stress Questionnaire     Feeling of Stress : Not at all   Recent Concern: Stress - Stress Concern Present (12/11/2024)    [employer] - Occupational  Stress Questionnaire     Feeling of Stress : Very much   Housing Stability: Low Risk  (1/8/2025)    Housing Stability Vital Sign     Unable to Pay for Housing in the Last Year: No     Homeless in the Last Year: No   Recent Concern: Housing Stability - High Risk (12/11/2024)    Housing Stability Vital Sign     Unable to Pay for Housing in the Last Year: Yes     Homeless in the Last Year: No     Review of Systems   Constitutional:  Positive for fatigue. Negative for activity change, appetite change and fever.   HENT:  Negative for sore throat.    Eyes:  Negative for visual disturbance.   Respiratory:  Negative for cough, shortness of breath and wheezing.    Cardiovascular:  Negative for chest pain, palpitations and leg swelling.   Gastrointestinal:  Positive for abdominal pain. Negative for abdominal distention, diarrhea, nausea and vomiting.   Genitourinary:  Negative for dysuria and flank pain.   Musculoskeletal:  Positive for myalgias.   Skin:  Negative for color change.   Neurological:  Negative for dizziness and headaches.   Psychiatric/Behavioral:  Negative for confusion and decreased concentration.      Objective:     Vital Signs (Most Recent):  Temp: 98.4 °F (36.9 °C) (01/10/25 0724)  Pulse: 80 (01/10/25 0724)  Resp: 18 (01/10/25 0724)  BP: 113/78 (01/10/25 0724)  SpO2: (!) 94 % (01/10/25 0724) Vital Signs (24h Range):  Temp:  [98.4 °F (36.9 °C)-99.1 °F (37.3 °C)] 98.4 °F (36.9 °C)  Pulse:  [] 80  Resp:  [10-18] 18  SpO2:  [94 %-99 %] 94 %  BP: (113-160)/() 113/78     Weight: 93.4 kg (206 lb)  Body mass index is 37.68 kg/m².    Estimated Creatinine Clearance: 68 mL/min (based on SCr of 1.1 mg/dL).     Physical Exam  Constitutional:       Appearance: Normal appearance.   HENT:      Head: Normocephalic and atraumatic.      Nose: Nose normal.      Mouth/Throat:      Mouth: Mucous membranes are moist.   Eyes:      Extraocular Movements: Extraocular movements intact.      Pupils: Pupils are equal,  round, and reactive to light.   Cardiovascular:      Rate and Rhythm: Normal rate and regular rhythm.      Heart sounds: No murmur heard.     No gallop.   Pulmonary:      Effort: Pulmonary effort is normal.      Breath sounds: Normal breath sounds. No wheezing or rales.   Abdominal:      General: Abdomen is flat. Bowel sounds are normal. There is no distension.      Palpations: Abdomen is soft.      Tenderness: There is no abdominal tenderness.   Musculoskeletal:         General: No swelling or tenderness. Normal range of motion.      Cervical back: Normal range of motion and neck supple.      Right lower leg: No edema.      Left lower leg: No edema.      Comments: Port site on R chest without erythema, induration, fluctuance, or tenderness.   Previous L port site scar well healed   Skin:     General: Skin is warm and dry.      Capillary Refill: Capillary refill takes less than 2 seconds.      Comments: Scarring present on the right leg    Neurological:      General: No focal deficit present.      Mental Status: She is alert. Mental status is at baseline.   Psychiatric:         Mood and Affect: Mood normal.          Significant Labs: Blood Culture:   Recent Labs   Lab 10/21/24  0516 11/12/24  1802 11/12/24  1805 01/07/25  1934 01/09/25  0533   LABBLOO No growth after 5 days. No growth after 5 days. No growth after 5 days. Gram stain aer bottle: Gram positive cocci in clusters resembling Staph  Results called to and read back by: Rachel BLANCO 01/09/2025  04:21  Gram stain kimberly bottle: Gram positive cocci in clusters resembling Staph  Positive results previously called 01/09/2025  15:07  Gram stain aer bottle: Gram positive cocci in clusters resembling Staph  Results called to and read back by:Belia Moses RN 01/08/2025  21:02  Gram stain kimberly bottle: Gram positive cocci in clusters resembling Staph  Positive results previously called 01/09/2025  15:06  COAGULASE-NEGATIVE STAPHYLOCOCCUS SPECIES  Organism is  a probable contaminant  * No Growth to date  No Growth to date     CBC:   Recent Labs   Lab 01/09/25  0533 01/10/25  0352   WBC 6.97 6.86   HGB 8.8* 8.7*   HCT 27.6* 28.1*    286     CMP:   Recent Labs   Lab 01/09/25  0533 01/10/25  0352    138   K 4.5 4.1   * 108   CO2 24 22*   GLU 74 73   BUN 15 14   CREATININE 1.4 1.1   CALCIUM 8.9 9.1   PROT 7.3 7.5   ALBUMIN 3.4* 3.4*   BILITOT 0.3 0.2   ALKPHOS 76 80   AST 28 31   ALT 17 18   ANIONGAP 6* 8       Significant Imaging: Pertinent imaging reviewed

## 2025-01-10 NOTE — NURSING
Sterile dressing change to right chest port and charted in Epic under flowsheets. No needs from patient at this time. Will continue to monitor patient.

## 2025-01-10 NOTE — PLAN OF CARE
Notified about chest pain. Patient complaining of sharp chest pain and some shortness of breath. Pain with palpation of chest. Will order EKG and Troponin but less concern for cardiac cause. CXR ordered. Workup on going.

## 2025-01-10 NOTE — PLAN OF CARE
Problem: Adult Inpatient Plan of Care  Goal: Patient-Specific Goal (Individualized)  Outcome: Progressing     Problem: Sickle Cell Disease  Goal: Optimal Cerebral Tissue Perfusion  Outcome: Progressing  Goal: Optimal Coping with Sickle Cell Disease  Outcome: Progressing  Goal: Effective Tissue Perfusion  Outcome: Progressing  Goal: Optimal Oxygenation  Outcome: Progressing     Problem: Fall Injury Risk  Goal: Absence of Fall and Fall-Related Injury  Outcome: Progressing

## 2025-01-10 NOTE — ASSESSMENT & PLAN NOTE
-blood cultures with staph epidermis  -possible contaminant but given patient has a port will place ID consult for further recs  -discussed with ID will order TTE   -initially Vancomycin held, restarted and Rocephin stopped per recommendations   -will need port removal surgery consult placed.

## 2025-01-10 NOTE — HPI
Nazanin Malone is a 44F with sickle cell beta thalassemia zero, multiple pulmonary emboli on chronic AC, asthma, and HTN who [place] [facility] [place] for diffuse myalgias and pain consistent with a sickle cell pain crisis as well as fever, nausea, vomiting, and diarrhea for the past day. Since admission, normotensive with Tmax 100.9 on 1/7, however afebrile since 1/8. Labs notable for Hgb 9.8 and K 2.9. CXR NAIPP. COVID, Flu, and RSV negative. Blood cultures were obtained 1/7 which grew CoNS in both sets. BCX recollected 1/9. Patient states continued subjective fever/chills. Vomiting and diarrhea have improved. She strongly prefers to retain her port (in place for 2yr, no recent exchange). No focal joint pain or spinal pain. ID consulted for CoNS bacteremia in setting of port.

## 2025-01-10 NOTE — PLAN OF CARE
[facility]  Discharge Reassessment    Primary Care Provider: Kezia, Primary Doctor    Expected Discharge Date: 1/12/2025      Darrion spoke with pt over the phone. Pt is independent and does not use any medical equipment in the home. Pt lives in the home with family. Pt stated that she does not need any home health services at this time.     Discharge Plan A and Plan B have been determined by review of patient's clinical status, future medical and therapeutic needs, and coverage/benefits for post-acute care in coordination with multidisciplinary team members.    Reassessment (most recent)       Discharge Reassessment - 01/10/25 1344          Discharge Reassessment    Assessment Type Discharge Planning Reassessment (P)      Did the patient's condition or plan change since previous assessment? No (P)      Discharge Plan discussed with: Patient (P)      Communicated ALYSE with patient/caregiver Yes (P)      Discharge Plan A Home with family (P)      Discharge Plan B Home (P)      DME Needed Upon Discharge  none (P)      Transition of Care Barriers None (P)      Why the patient remains in the hospital Requires continued medical care (P)         Post-Acute Status    Post-Acute Authorization Other (P)      Coverage [employer] MANAGED MEDICARE - [employer] MEDICARE PLAN HMO (P)      Other Status No Post-Acute Service Needs (P)      Discharge Delays None known at this time (P)                    DARRION Nichole  Case Management  [phone]

## 2025-01-10 NOTE — ASSESSMENT & PLAN NOTE
Tmax 100.6 in the ED.    Plan  - CTX 2g q24h  -1/4 bottles with GPC-possible contaminant, added Vanc but held after results and repeat cultures ordered   -see bacteremia plan

## 2025-01-10 NOTE — SUBJECTIVE & OBJECTIVE
Interval History: Pt seen and examined this morning on thea SANCHO. Still complaining of pain. Care plan reviewed. Otherwise, doing well and with no further complaints at this time.      Objective:     Vital Signs (Most Recent):  Temp: 98.4 °F (36.9 °C) (01/10/25 0724)  Pulse: 80 (01/10/25 0724)  Resp: 18 (01/10/25 0724)  BP: 113/78 (01/10/25 0724)  SpO2: (!) 94 % (01/10/25 0724) Vital Signs (24h Range):  Temp:  [98.4 °F (36.9 °C)-99.1 °F (37.3 °C)] 98.4 °F (36.9 °C)  Pulse:  [] 80  Resp:  [10-18] 18  SpO2:  [94 %-99 %] 94 %  BP: (113-160)/() 113/78     Weight: 93.4 kg (206 lb)  Body mass index is 37.68 kg/m².  No intake or output data in the 24 hours ending 01/10/25 0733      Physical Exam  Constitutional:       Appearance: Normal appearance.   HENT:      Head: Normocephalic and atraumatic.      Nose: Nose normal.      Mouth/Throat:      Mouth: Mucous membranes are moist.   Eyes:      Extraocular Movements: Extraocular movements intact.      Pupils: Pupils are equal, round, and reactive to light.   Cardiovascular:      Rate and Rhythm: Normal rate and regular rhythm.      Heart sounds: No murmur heard.     No gallop.   Pulmonary:      Effort: Pulmonary effort is normal.      Breath sounds: Normal breath sounds. No wheezing or rales.   Abdominal:      General: Abdomen is flat. Bowel sounds are normal. There is no distension.      Palpations: Abdomen is soft.      Tenderness: There is no abdominal tenderness.   Musculoskeletal:         General: No swelling or tenderness. Normal range of motion.      Cervical back: Normal range of motion and neck supple.      Right lower leg: No edema.      Left lower leg: No edema.   Skin:     General: Skin is warm and dry.      Capillary Refill: Capillary refill takes less than 2 seconds.      Comments: Scarring present on the right leg    Neurological:      General: No focal deficit present.      Mental Status: She is alert. Mental status is at baseline.    Psychiatric:         Mood and Affect: Mood normal.             Significant Labs: All pertinent labs within the past 24 hours have been reviewed.    Significant Imaging: I have reviewed all pertinent imaging results/findings within the past 24 hours.

## 2025-01-10 NOTE — CONSULTS
Pharmacokinetic Initial Assessment: IV Vancomycin    Assessment/Plan:    Initiate intravenous vancomycin with loading dose of 2250 mg once followed by a maintenance dose of vancomycin 1500 mg IV every 24 hours  Desired empiric serum trough concentration is 15 to 20 mcg/mL  Draw vancomycin trough level 60 min prior to third dose on 1/12/25 at approximately 1130  Pharmacy will continue to follow and monitor vancomycin.      Please contact pharmacy at extension [phone] with any questions regarding this assessment.     Thank you for the consult,   Dayami Knott, PharmD       Patient brief summary:  Nazanin Malone is a 44 y.o. female initiated on antimicrobial therapy with IV Vancomycin for treatment of suspected bacteremia    Drug Allergies:   Review of patient's allergies indicates:   Allergen Reactions    Cat dander Anaphylaxis, Itching and Shortness Of Breath    Nsaids (non-steroidal anti-inflammatory drug) Itching and Anaphylaxis    Latex Rash       Actual Body Weight:   93.4 kg    Renal Function:   Estimated Creatinine Clearance: 68 mL/min (based on SCr of 1.1 mg/dL).,     Dialysis Method (if applicable):  N/A    CBC (last 72 hours):  Recent Labs   Lab Result Units 01/07/25 1934 01/08/25 0621 01/09/25  0533 01/10/25  0352   WBC K/uL 5.98 8.74 6.97 6.86   Hemoglobin g/dL 9.8* 8.7* 8.8* 8.7*   Hematocrit % 27.7* 25.7* 27.6* 28.1*   Platelets K/uL 481* 363 361 286   Gran % % 46.9 39.8 39.4 34.5*   Lymph % % 44.1 47.6 44.8 49.7*   Mono % % 7.9 9.7 11.0 9.8   Eosinophil % % 0.3 1.6 4.0 4.7   Basophil % % 0.5 1.0 0.7 1.0   Differential Method  Automated Automated Automated Automated       Metabolic Panel (last 72 hours):  Recent Labs   Lab Result Units 01/07/25 1934 01/08/25 0339 01/08/25 0621 01/09/25  0533 01/10/25  0352   Sodium mmol/L 141  --  140 142 138   Potassium mmol/L 2.9*  --  3.6 4.5 4.1   Chloride mmol/L 109  --  107 112* 108   CO2 mmol/L 20*  --  24 24 22*   Glucose mg/dL 93  --  109 74 73   Glucose, UA   " --  Negative  --   --   --    BUN mg/dL 8  --  8 15 14   Creatinine mg/dL 1.1  --  1.1 1.4 1.1   Albumin g/dL 4.0  --  3.6 3.4* 3.4*   Total Bilirubin mg/dL 0.5  --  0.5 0.3 0.2   Alkaline Phosphatase U/L 90  --  81 76 80   AST U/L 17  --  23 28 31   ALT U/L 10  --  10 17 18   Magnesium mg/dL 1.8  --  1.7 1.9 1.8   Phosphorus mg/dL  --   --  3.1 4.6* 3.8       Drug levels (last 3 results):  No results for input(s): "VANCOMYCINRA", "VANCORANDOM", "VANCOMYCINPE", "VANCOPEAK", "VANCOMYCINTR", "VANCOTROUGH" in the last 72 hours.    Microbiologic Results:  Microbiology Results (last 7 days)       Procedure Component Value Units Date/Time    Blood culture #1 **CANNOT BE ORDERED STAT** [[id number]]  (Abnormal) Collected: 01/07/25 1934    Order Status: Completed Specimen: Blood from Peripheral, Hand, Right Updated: 01/10/25 1222     Blood Culture, Routine Gram stain aer bottle: Gram positive cocci in clusters resembling Staph      Results called to and read back by: Rachel BLANCO 01/09/2025  04:21      Gram stain kimberly bottle: Gram positive cocci in clusters resembling Staph      Positive results previously called 01/09/2025  15:07      STAPHYLOCOCCUS CAPITIS  Susceptibility pending      Blood culture #2 **CANNOT BE ORDERED STAT** [[id number]]  (Abnormal) Collected: 01/07/25 1934    Order Status: Completed Specimen: Blood from Peripheral, Hand, Left Updated: 01/10/25 1222     Blood Culture, Routine Gram stain aer bottle: Gram positive cocci in clusters resembling Staph      Results called to and read back by:Belia Moses RN 01/08/2025      21:02      Gram stain kimberly bottle: Gram positive cocci in clusters resembling Staph      Positive results previously called 01/09/2025  15:06      COAGULASE-NEGATIVE STAPHYLOCOCCUS SPECIES  Organism is a probable contaminant        STAPHYLOCOCCUS CAPITIS  Susceptibility pending      Blood culture [[id number]] Collected: 01/09/25 0533    Order Status: Completed Specimen: Blood from " Peripheral, Antecubital, Right Updated: 01/10/25 0812     Blood Culture, Routine No Growth to date      No Growth to date    Blood culture [[id number]] Collected: 01/09/25 0533    Order Status: Completed Specimen: Blood from Peripheral, Lower Arm, Right Updated: 01/10/25 0812     Blood Culture, Routine No Growth to date      No Growth to date    MRSA/SA Rapid ID by PCR from Blood culture [[id number]] Collected: 01/07/25 1934    Order Status: Completed Updated: 01/08/25 2227     Staph aureus ID by PCR Negative     Methicillin Resistant ID by PCR Negative    Clostridium difficile EIA [[id number]]     Order Status: Canceled Specimen: Stool

## 2025-01-10 NOTE — CARE UPDATE
Unit [facility] Care Support Interaction      I have reviewed the chart of Nazanin Malone who is hospitalized for Vaso-occlusive pain due to sickle cell disease. The patient is currently located in the following unit: [facility]SU        I have assisted the primary physician in management of the following:      MRSA Decolonization - Mupirocin ordered and CHG ordered    Unique Hawthorne PA-C  Unit Based [facility]

## 2025-01-10 NOTE — ASSESSMENT & PLAN NOTE
CoNS (methicillin sensitive) identified BCX 1/7. Started on vancomycin 1/8, has been on CTX 2g q24h since 1/7. No new murmur. Per patient, noted that port site occasionally develops welts/lesions around it with concurrent erythema, though port site today appears c/d/I with no induration. Afebrile since 1/8. CoNS speciation with susceptibilities pending, will need to treat with Vancomycin until then. Given patient description of skin lesions/erythema around port PTA with Tmax 102 at home, favor removing port and allowing for line holiday prior to consideration of replacement    - Stop CTX  - Continue Vancomycin, goal trough 10-15  - Recommend IR consultation for port removal  - F/u CoNS susceptibilities and repeat BCX  - Plan discussed with primary

## 2025-01-10 NOTE — SUBJECTIVE & OBJECTIVE
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    Asthma     Avascular necrosis of femur     Depression     History of pulmonary embolism     Hypertension     Morbid obesity     Opioid dependence 2017    Pneumonia due to Streptococcus pneumoniae 2017    Right lower lobe pneumonia 2017    Sepsis due to Streptococcus pneumoniae 2017    Sickle cell-beta thalassemia disease with pain     Trigeminal neuralgia        Past Surgical History:   Procedure Laterality Date     SECTION      ESOPHAGOGASTRODUODENOSCOPY N/A 2024    Procedure: EGD (ESOPHAGOGASTRODUODENOSCOPY);  Surgeon: Allen Marshall MD;  Location: [address]);  Service: Gastroenterology;  Laterality: N/A;    TONSILLECTOMY      TUBAL LIGATION         Review of patient's allergies indicates:   Allergen Reactions    Cat dander Anaphylaxis, Itching and Shortness Of Breath    Nsaids (non-steroidal anti-inflammatory drug) Itching and Anaphylaxis    Latex Rash       Medications:  Medications Prior to Admission   Medication Sig    acetaminophen (TYLENOL) 500 MG tablet Take 1,000 mg by mouth every 8 (eight) hours as needed for Pain.    albuterol (VENTOLIN HFA) 90 mcg/actuation inhaler Inhale 2 puffs into the lungs every 6 (six) hours as needed for Wheezing or Shortness of Breath. Rescue    amLODIPine (NORVASC) 10 MG tablet Take 1 tablet (10 mg total) by mouth once daily.    apixaban (ELIQUIS) 5 mg Tab Take 1 tablet (5 mg total) by mouth 2 (two) times daily.    ascorbic acid (VITAMIN C ORAL) Take 1 capsule by mouth once daily.    FLUoxetine 40 MG capsule Take 1 capsule (40 mg total) by mouth once daily.    fluticasone propionate (FLONASE) 50 mcg/actuation nasal spray INSTILL 1 SPRAY INTO EACH NOSTRIL EVERY DAY    folic acid (FOLVITE) 1 MG tablet Take 1 tablet (1 mg total) by mouth once daily.    gabapentin (NEURONTIN) 300 MG  capsule Take 2 capsules (600 mg total) by mouth 3 (three) times daily.    hydroxyurea (HYDREA) 500 mg Cap Take 2 capsules (1,000 mg total) by mouth once daily.    hydrOXYzine pamoate (VISTARIL) 25 MG Cap Take 1 capsule (25 mg total) by mouth every 4 (four) hours as needed (Anxiety).    LORazepam (ATIVAN) 0.5 MG tablet Take 2 tablets (1 mg total) by mouth every 8 (eight) hours as needed for Anxiety.    metoclopramide HCl (REGLAN) 5 MG tablet Take 1 tablet (5 mg total) by mouth 3 (three) times daily before meals.    oxyCODONE (ROXICODONE) 15 MG Tab Take 1 tablet (15 mg total) by mouth every 4 (four) hours as needed for Pain.    pantoprazole (PROTONIX) 40 MG tablet Take 1 tablet (40 mg total) by mouth 2 (two) times daily.    promethazine (PHENERGAN) 12.5 MG Tab Take 2 tablets (25 mg total) by mouth 4 (four) times daily.    senna-docusate 8.6-50 mg (PERICOLACE) 8.6-50 mg per tablet Take 1 tablet by mouth daily as needed for Constipation.    sucralfate (CARAFATE) 1 gram tablet Take 1 tablet (1 g total) by mouth 4 (four) times daily before meals and nightly.    tiZANidine (ZANAFLEX) 2 MG tablet Take 2 tablets (4 mg total) by mouth every 8 (eight) hours as needed.     Antibiotics (From admission, onward)      Start     Stop Route Frequency Ordered    01/09/25 0115  cefTRIAXone injection 2 g         -- IV Every 24 hours (non-standard times) 01/08/25 1302          Antifungals (From admission, onward)      None          Antivirals (From admission, onward)      None             Immunization History   Administered Date(s) Administered    HiB PRP-T 06/23/2017    Meningococcal Conjugate (MCV4P) 06/23/2017    Pneumococcal Conjugate - 13 Valent 06/23/2017       Family History       Problem Relation (Age of Onset)    Diabetes Mother    Heart disease Mother, Father          Social History     Socioeconomic History    Marital status: Single   Tobacco Use    Smoking status: Never    Smokeless tobacco: Never   Substance and Sexual  Activity    Alcohol use: No    Drug use: Yes     Types: Marijuana     Comment: periodically     Sexual activity: Not Currently     Birth control/protection: See Surgical Hx     Social Drivers of Health     Financial Resource Strain: Low Risk  (1/8/2025)    Overall Financial Resource Strain (CARDIA)     Difficulty of Paying Living Expenses: Not very hard   Recent Concern: Financial Resource Strain - High Risk (12/11/2024)    Overall Financial Resource Strain (CARDIA)     Difficulty of Paying Living Expenses: Very hard   Food Insecurity: No Food Insecurity (1/8/2025)    Hunger Vital Sign     Worried About Running Out of Food in the Last Year: Never true     Ran Out of Food in the Last Year: Never true   Recent Concern: Food Insecurity - Food Insecurity Present (12/11/2024)    Hunger Vital Sign     Worried About Running Out of Food in the Last Year: Often true     Ran Out of Food in the Last Year: Often true   Transportation Needs: No Transportation Needs (1/8/2025)    TRANSPORTATION NEEDS     Transportation : No   Physical Activity: Inactive (1/8/2025)    Exercise Vital Sign     Days of Exercise per Week: 0 days     Minutes of Exercise per Session: 0 min   Stress: No Stress Concern Present (1/8/2025)    [facility] [place] [facility] [employer] [facility] - Occupational Stress Questionnaire     Feeling of Stress : Not at all   Recent Concern: Stress - Stress Concern Present (12/11/2024)    [employer] [place] [employer] - Occupational Stress Questionnaire     Feeling of Stress : Very much   Housing Stability: Low Risk  (1/8/2025)    Housing Stability Vital Sign     Unable to Pay for Housing in the Last Year: No     Homeless in the Last Year: No   Recent Concern: Housing Stability - High Risk (12/11/2024)    Housing Stability Vital Sign     Unable to Pay for Housing in the Last Year: Yes     Homeless in the Last Year: No     Review of Systems   Constitutional:  Positive for fatigue. Negative for activity change, appetite  change and fever.   HENT:  Negative for sore throat.    Eyes:  Negative for visual disturbance.   Respiratory:  Negative for cough, shortness of breath and wheezing.    Cardiovascular:  Negative for chest pain, palpitations and leg swelling.   Gastrointestinal:  Positive for abdominal pain. Negative for abdominal distention, diarrhea, nausea and vomiting.   Genitourinary:  Negative for dysuria and flank pain.   Musculoskeletal:  Positive for myalgias.   Skin:  Negative for color change.   Neurological:  Negative for dizziness and headaches.   Psychiatric/Behavioral:  Negative for confusion and decreased concentration.      Objective:     Vital Signs (Most Recent):  Temp: 98.4 °F (36.9 °C) (01/10/25 0724)  Pulse: 80 (01/10/25 0724)  Resp: 18 (01/10/25 0724)  BP: 113/78 (01/10/25 0724)  SpO2: (!) 94 % (01/10/25 0724) Vital Signs (24h Range):  Temp:  [98.4 °F (36.9 °C)-99.1 °F (37.3 °C)] 98.4 °F (36.9 °C)  Pulse:  [] 80  Resp:  [10-18] 18  SpO2:  [94 %-99 %] 94 %  BP: (113-160)/() 113/78     Weight: 93.4 kg (206 lb)  Body mass index is 37.68 kg/m².    Estimated Creatinine Clearance: 68 mL/min (based on SCr of 1.1 mg/dL).     Physical Exam  Constitutional:       Appearance: Normal appearance.   HENT:      Head: Normocephalic and atraumatic.      Nose: Nose normal.      Mouth/Throat:      Mouth: Mucous membranes are moist.   Eyes:      Extraocular Movements: Extraocular movements intact.      Pupils: Pupils are equal, round, and reactive to light.   Cardiovascular:      Rate and Rhythm: Normal rate and regular rhythm.      Heart sounds: No murmur heard.     No gallop.   Pulmonary:      Effort: Pulmonary effort is normal.      Breath sounds: Normal breath sounds. No wheezing or rales.   Abdominal:      General: Abdomen is flat. Bowel sounds are normal. There is no distension.      Palpations: Abdomen is soft.      Tenderness: There is no abdominal tenderness.   Musculoskeletal:         General: No swelling or  tenderness. Normal range of motion.      Cervical back: Normal range of motion and neck supple.      Right lower leg: No edema.      Left lower leg: No edema.      Comments: Port site on R chest without erythema, induration, fluctuance, or tenderness.   Previous L port site scar well healed   Skin:     General: Skin is warm and dry.      Capillary Refill: Capillary refill takes less than 2 seconds.      Comments: Scarring present on the right leg    Neurological:      General: No focal deficit present.      Mental Status: She is alert. Mental status is at baseline.   Psychiatric:         Mood and Affect: Mood normal.          Significant Labs: Blood Culture:   Recent Labs   Lab 10/21/24  0516 11/12/24  1802 11/12/24  1805 01/07/25  1934 01/09/25  0533   LABBLOO No growth after 5 days. No growth after 5 days. No growth after 5 days. Gram stain aer bottle: Gram positive cocci in clusters resembling Staph  Results called to and read back by: Rachel BLANCO 01/09/2025  04:21  Gram stain kimberly bottle: Gram positive cocci in clusters resembling Staph  Positive results previously called 01/09/2025  15:07  Gram stain aer bottle: Gram positive cocci in clusters resembling Staph  Results called to and read back by:Belia Moses RN 01/08/2025  21:02  Gram stain [place]im[place]ly bottle: Gram positive cocci in clusters resembling Staph  Positive results previously called 01/09/2025  15:06  COAGULASE-NEGATIVE STAPHYLOCOCCUS SPECIES  Organism is a probable contaminant  * No Growth to date  No Growth to date     CBC:   Recent Labs   Lab 01/09/25  0533 01/10/25  0352   WBC 6.97 6.86   HGB 8.8* 8.7*   HCT 27.6* 28.1*    286     CMP:   Recent Labs   Lab 01/09/25  0533 01/10/25  0352    138   K 4.5 4.1   * 108   CO2 24 22*   GLU 74 73   BUN 15 14   CREATININE 1.4 1.1   CALCIUM 8.9 9.1   PROT 7.3 7.5   ALBUMIN 3.4* 3.4*   BILITOT 0.3 0.2   ALKPHOS 76 80   AST 28 31   ALT 17 18   ANIONGAP 6* 8       Significant Imaging:  Pertinent imaging reviewed

## 2025-01-11 PROBLEM — R07.89 CHEST WALL PAIN: Status: ACTIVE | Noted: 2025-01-11

## 2025-01-11 LAB
ALBUMIN SERPL BCP-MCNC: 3.4 G/DL (ref 3.5–5.2)
ALP SERPL-CCNC: 76 U/L (ref 40–150)
ALT SERPL W/O P-5'-P-CCNC: 16 U/L (ref 10–44)
ANION GAP SERPL CALC-SCNC: 7 MMOL/L (ref 8–16)
AST SERPL-CCNC: 18 U/L (ref 10–40)
BASOPHILS # BLD AUTO: 0.07 K/UL (ref 0–0.2)
BASOPHILS NFR BLD: 1 % (ref 0–1.9)
BILIRUB SERPL-MCNC: 0.2 MG/DL (ref 0.1–1)
BUN SERPL-MCNC: 13 MG/DL (ref 6–20)
CALCIUM SERPL-MCNC: 8.9 MG/DL (ref 8.7–10.5)
CHLORIDE SERPL-SCNC: 106 MMOL/L (ref 95–110)
CO2 SERPL-SCNC: 28 MMOL/L (ref 23–29)
CREAT SERPL-MCNC: 1 MG/DL (ref 0.5–1.4)
DIFFERENTIAL METHOD BLD: ABNORMAL
EOSINOPHIL # BLD AUTO: 0.4 K/UL (ref 0–0.5)
EOSINOPHIL NFR BLD: 5.8 % (ref 0–8)
ERYTHROCYTE [DISTWIDTH] IN BLOOD BY AUTOMATED COUNT: 21.7 % (ref 11.5–14.5)
EST. GFR  (NO RACE VARIABLE): >60 ML/MIN/1.73 M^2
GLUCOSE SERPL-MCNC: 85 MG/DL (ref 70–110)
HCT VFR BLD AUTO: 25.9 % (ref 37–48.5)
HGB BLD-MCNC: 8.4 G/DL (ref 12–16)
IMM GRANULOCYTES # BLD AUTO: 0.01 K/UL (ref 0–0.04)
IMM GRANULOCYTES NFR BLD AUTO: 0.1 % (ref 0–0.5)
LYMPHOCYTES # BLD AUTO: 4.2 K/UL (ref 1–4.8)
LYMPHOCYTES NFR BLD: 59 % (ref 18–48)
MAGNESIUM SERPL-MCNC: 1.8 MG/DL (ref 1.6–2.6)
MCH RBC QN AUTO: 21.4 PG (ref 27–31)
MCHC RBC AUTO-ENTMCNC: 32.4 G/DL (ref 32–36)
MCV RBC AUTO: 66 FL (ref 82–98)
MONOCYTES # BLD AUTO: 0.7 K/UL (ref 0.3–1)
MONOCYTES NFR BLD: 9.9 % (ref 4–15)
NEUTROPHILS # BLD AUTO: 1.7 K/UL (ref 1.8–7.7)
NEUTROPHILS NFR BLD: 24.2 % (ref 38–73)
NRBC BLD-RTO: 1 /100 WBC
PHOSPHATE SERPL-MCNC: 3.3 MG/DL (ref 2.7–4.5)
PLATELET # BLD AUTO: 313 K/UL (ref 150–450)
PMV BLD AUTO: 9.1 FL (ref 9.2–12.9)
POTASSIUM SERPL-SCNC: 4 MMOL/L (ref 3.5–5.1)
PROT SERPL-MCNC: 7.2 G/DL (ref 6–8.4)
RBC # BLD AUTO: 3.92 M/UL (ref 4–5.4)
SODIUM SERPL-SCNC: 141 MMOL/L (ref 136–145)
WBC # BLD AUTO: 7.08 K/UL (ref 3.9–12.7)

## 2025-01-11 PROCEDURE — 63600175 PHARM REV CODE 636 W HCPCS: Performed by: INTERNAL MEDICINE

## 2025-01-11 PROCEDURE — 25000003 PHARM REV CODE 250: Performed by: STUDENT IN AN ORGANIZED HEALTH CARE EDUCATION/TRAINING PROGRAM

## 2025-01-11 PROCEDURE — 25000003 PHARM REV CODE 250: Performed by: EMERGENCY MEDICINE

## 2025-01-11 PROCEDURE — 85025 COMPLETE CBC W/AUTO DIFF WBC: CPT

## 2025-01-11 PROCEDURE — 84100 ASSAY OF PHOSPHORUS: CPT

## 2025-01-11 PROCEDURE — 99223 1ST HOSP IP/OBS HIGH 75: CPT | Mod: GC,,, | Performed by: SURGERY

## 2025-01-11 PROCEDURE — 36415 COLL VENOUS BLD VENIPUNCTURE: CPT

## 2025-01-11 PROCEDURE — 25000003 PHARM REV CODE 250

## 2025-01-11 PROCEDURE — 11000001 HC ACUTE MED/SURG PRIVATE ROOM

## 2025-01-11 PROCEDURE — 80053 COMPREHEN METABOLIC PANEL: CPT

## 2025-01-11 PROCEDURE — 63600175 PHARM REV CODE 636 W HCPCS: Performed by: STUDENT IN AN ORGANIZED HEALTH CARE EDUCATION/TRAINING PROGRAM

## 2025-01-11 PROCEDURE — 83735 ASSAY OF MAGNESIUM: CPT

## 2025-01-11 RX ORDER — DIPHENHYDRAMINE HYDROCHLORIDE 50 MG/ML
50 INJECTION INTRAMUSCULAR; INTRAVENOUS ONCE
Status: COMPLETED | OUTPATIENT
Start: 2025-01-11 | End: 2025-01-11

## 2025-01-11 RX ORDER — LIDOCAINE HYDROCHLORIDE 10 MG/ML
10 INJECTION, SOLUTION INFILTRATION; PERINEURAL ONCE
Status: DISCONTINUED | OUTPATIENT
Start: 2025-01-11 | End: 2025-01-11

## 2025-01-11 RX ORDER — METHOCARBAMOL 500 MG/1
1000 TABLET, FILM COATED ORAL 3 TIMES DAILY
Status: DISCONTINUED | OUTPATIENT
Start: 2025-01-11 | End: 2025-01-25 | Stop reason: HOSPADM

## 2025-01-11 RX ADMIN — METHOCARBAMOL 1000 MG: 500 TABLET ORAL at 02:01

## 2025-01-11 RX ADMIN — Medication 6 MG: at 12:01

## 2025-01-11 RX ADMIN — POLYETHYLENE GLYCOL 3350 17 G: 17 POWDER, FOR SOLUTION ORAL at 09:01

## 2025-01-11 RX ADMIN — SUCRALFATE 1 G: 1 TABLET ORAL at 11:01

## 2025-01-11 RX ADMIN — DIPHENHYDRAMINE HYDROCHLORIDE 25 MG: 50 INJECTION, SOLUTION INTRAMUSCULAR; INTRAVENOUS at 06:01

## 2025-01-11 RX ADMIN — DIPHENHYDRAMINE HYDROCHLORIDE 50 MG: 50 INJECTION, SOLUTION INTRAMUSCULAR; INTRAVENOUS at 09:01

## 2025-01-11 RX ADMIN — METHOCARBAMOL 750 MG: 750 TABLET ORAL at 09:01

## 2025-01-11 RX ADMIN — DIPHENHYDRAMINE HYDROCHLORIDE 25 MG: 50 INJECTION, SOLUTION INTRAMUSCULAR; INTRAVENOUS at 02:01

## 2025-01-11 RX ADMIN — PANTOPRAZOLE SODIUM 40 MG: 40 TABLET, DELAYED RELEASE ORAL at 09:01

## 2025-01-11 RX ADMIN — FOLIC ACID 1 MG: 1 TABLET ORAL at 09:01

## 2025-01-11 RX ADMIN — ACETAMINOPHEN 975 MG: 325 TABLET ORAL at 02:01

## 2025-01-11 RX ADMIN — HYDROXYUREA 1000 MG: 500 CAPSULE ORAL at 09:01

## 2025-01-11 RX ADMIN — SENNOSIDES AND DOCUSATE SODIUM 1 TABLET: 50; 8.6 TABLET ORAL at 09:01

## 2025-01-11 RX ADMIN — AMLODIPINE BESYLATE 10 MG: 10 TABLET ORAL at 09:01

## 2025-01-11 RX ADMIN — HYDROMORPHONE HYDROCHLORIDE 3 MG: 1 INJECTION, SOLUTION INTRAMUSCULAR; INTRAVENOUS; SUBCUTANEOUS at 02:01

## 2025-01-11 RX ADMIN — METHOCARBAMOL 1000 MG: 500 TABLET ORAL at 09:01

## 2025-01-11 RX ADMIN — ACETAMINOPHEN 975 MG: 325 TABLET ORAL at 09:01

## 2025-01-11 RX ADMIN — GABAPENTIN 600 MG: 300 CAPSULE ORAL at 09:01

## 2025-01-11 RX ADMIN — SUCRALFATE 1 G: 1 TABLET ORAL at 04:01

## 2025-01-11 RX ADMIN — VANCOMYCIN HYDROCHLORIDE 1500 MG: 1.5 INJECTION, POWDER, LYOPHILIZED, FOR SOLUTION INTRAVENOUS at 12:01

## 2025-01-11 RX ADMIN — DIPHENHYDRAMINE HYDROCHLORIDE 25 MG: 50 INJECTION, SOLUTION INTRAMUSCULAR; INTRAVENOUS at 10:01

## 2025-01-11 RX ADMIN — HYDROMORPHONE HYDROCHLORIDE 3 MG: 1 INJECTION, SOLUTION INTRAMUSCULAR; INTRAVENOUS; SUBCUTANEOUS at 10:01

## 2025-01-11 RX ADMIN — GABAPENTIN 600 MG: 300 CAPSULE ORAL at 02:01

## 2025-01-11 RX ADMIN — SUCRALFATE 1 G: 1 TABLET ORAL at 09:01

## 2025-01-11 RX ADMIN — HYDROMORPHONE HYDROCHLORIDE 3 MG: 1 INJECTION, SOLUTION INTRAMUSCULAR; INTRAVENOUS; SUBCUTANEOUS at 09:01

## 2025-01-11 RX ADMIN — Medication 6 MG: at 10:01

## 2025-01-11 RX ADMIN — SUCRALFATE 1 G: 1 TABLET ORAL at 06:01

## 2025-01-11 RX ADMIN — HYDROMORPHONE HYDROCHLORIDE 3 MG: 1 INJECTION, SOLUTION INTRAMUSCULAR; INTRAVENOUS; SUBCUTANEOUS at 06:01

## 2025-01-11 RX ADMIN — OXYCODONE 15 MG: 5 TABLET ORAL at 09:01

## 2025-01-11 NOTE — SUBJECTIVE & OBJECTIVE
Interval History: Pt seen and examined this morning on thea SANCHO. Still having shoulder and right upper chest discomfort. Care plan reviewed. Otherwise, doing well and with no further complaints at this time.        Objective:     Vital Signs (Most Recent):  Temp: 98.6 °F (37 °C) (01/11/25 0433)  Pulse: 78 (01/11/25 0433)  Resp: 18 (01/11/25 0915)  BP: (!) 175/93 (01/11/25 0433)  SpO2: 99 % (01/11/25 0433) Vital Signs (24h Range):  Temp:  [98.4 °F (36.9 °C)-99.1 °F (37.3 °C)] 98.6 °F (37 °C)  Pulse:  [78-96] 78  Resp:  [18-20] 18  SpO2:  [96 %-100 %] 99 %  BP: (132-175)/(76-93) 175/93     Weight: 93.4 kg (206 lb)  Body mass index is 37.68 kg/m².  No intake or output data in the 24 hours ending 01/11/25 1112      Physical Exam  Constitutional:       Appearance: Normal appearance.   HENT:      Head: Normocephalic and atraumatic.      Nose: Nose normal.      Mouth/Throat:      Mouth: Mucous membranes are moist.   Eyes:      Extraocular Movements: Extraocular movements intact.      Pupils: Pupils are equal, round, and reactive to light.   Cardiovascular:      Rate and Rhythm: Normal rate and regular rhythm.      Heart sounds: No murmur heard.     No gallop.   Pulmonary:      Effort: Pulmonary effort is normal.      Breath sounds: Normal breath sounds. No wheezing or rales.   Abdominal:      General: Abdomen is flat. Bowel sounds are normal. There is no distension.      Palpations: Abdomen is soft.      Tenderness: There is no abdominal tenderness.   Musculoskeletal:         General: No swelling or tenderness. Normal range of motion.      Cervical back: Normal range of motion and neck supple.      Right lower leg: No edema.      Left lower leg: No edema.   Skin:     General: Skin is warm and dry.      Capillary Refill: Capillary refill takes less than 2 seconds.      Comments: Scarring present on the right leg   Port in place c/d/I. No overt signs of infection    Neurological:      General: No focal deficit present.       Mental Status: She is alert. Mental status is at baseline.   Psychiatric:         Mood and Affect: Mood normal.             Significant Labs: All pertinent labs within the past 24 hours have been reviewed.    Significant Imaging: I have reviewed all pertinent imaging results/findings within the past 24 hours.

## 2025-01-11 NOTE — ASSESSMENT & PLAN NOTE
-pain on the left side of the chest and shoulder area  -worse with palpation   -EKG and troponin negative   -echo without significant abnormalities   -seems to be MSK related, if worsens or vital sign changes or hypoxia will consider CTPE

## 2025-01-11 NOTE — CONSULTS
[facility] Surg  General Surgery  Consult Note    Inpatient consult to General Surgery  Consult performed by: Robyn Miller MD  Consult ordered by: Deb Quiroz MD        Subjective:     Chief Complaint/Reason for Consult/Admission: RIJ Port removal    History of Present Illness: Nazanin Malone is a 44-year-old female with sickle cell beta thalassemia zero, multiple pulmonary emboli on chronic AC, asthma, and HTN, current admitted for sickle cell crisis. Now with concern for bacteremia from previously placed RIJ port. Patient states port was placed 3 years ago and has been working well but reports some itching around the insertion site. Denies any pus, drainage, erythema, or pain at port site.    Patient on Elquis 5mg BID, last dose 0800 1/10. History of LIJ port placed and removed for thrombus.        No current facility-administered medications on file prior to encounter.     Current Outpatient Medications on File Prior to Encounter   Medication Sig    acetaminophen (TYLENOL) 500 MG tablet Take 1,000 mg by mouth every 8 (eight) hours as needed for Pain.    albuterol (VENTOLIN HFA) 90 mcg/actuation inhaler Inhale 2 puffs into the lungs every 6 (six) hours as needed for Wheezing or Shortness of Breath. Rescue    amLODIPine (NORVASC) 10 MG tablet Take 1 tablet (10 mg total) by mouth once daily.    apixaban (ELIQUIS) 5 mg Tab Take 1 tablet (5 mg total) by mouth 2 (two) times daily.    ascorbic acid (VITAMIN C ORAL) Take 1 capsule by mouth once daily.    FLUoxetine 40 MG capsule Take 1 capsule (40 mg total) by mouth once daily.    fluticasone propionate (FLONASE) 50 mcg/actuation nasal spray INSTILL 1 SPRAY INTO EACH NOSTRIL EVERY DAY    folic acid (FOLVITE) 1 MG tablet Take 1 tablet (1 mg total) by mouth once daily.    gabapentin (NEURONTIN) 300 MG capsule Take 2 capsules (600 mg total) by mouth 3 (three) times daily.    hydroxyurea (HYDREA) 500 mg Cap Take 2 capsules (1,000 mg total) by mouth once  daily.    hydrOXYzine pamoate (VISTARIL) 25 MG Cap Take 1 capsule (25 mg total) by mouth every 4 (four) hours as needed (Anxiety).    LORazepam (ATIVAN) 0.5 MG tablet Take 2 tablets (1 mg total) by mouth every 8 (eight) hours as needed for Anxiety.    metoclopramide HCl (REGLAN) 5 MG tablet Take 1 tablet (5 mg total) by mouth 3 (three) times daily before meals.    oxyCODONE (ROXICODONE) 15 MG Tab Take 1 tablet (15 mg total) by mouth every 4 (four) hours as needed for Pain.    pantoprazole (PROTONIX) 40 MG tablet Take 1 tablet (40 mg total) by mouth 2 (two) times daily.    promethazine (PHENERGAN) 12.5 MG Tab Take 2 tablets (25 mg total) by mouth 4 (four) times daily.    senna-docusate 8.6-50 mg (PERICOLACE) 8.6-50 mg per tablet Take 1 tablet by mouth daily as needed for Constipation.    sucralfate (CARAFATE) 1 gram tablet Take 1 tablet (1 g total) by mouth 4 (four) times daily before meals and nightly.    tiZANidine (ZANAFLEX) 2 MG tablet Take 2 tablets (4 mg total) by mouth every 8 (eight) hours as needed.       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    Asthma     Avascular necrosis of femur     Depression     History of pulmonary embolism     Hypertension     Morbid obesity     Opioid dependence 2017    Pneumonia due to Streptococcus pneumoniae 2017    Right lower lobe pneumonia 2017    Sepsis due to Streptococcus pneumoniae 2017    Sickle cell-beta thalassemia disease with pain     Trigeminal neuralgia      Past Surgical History:   Procedure Laterality Date     SECTION      ESOPHAGOGASTRODUODENOSCOPY N/A 2024    Procedure: EGD  (ESOPHAGOGASTRODUODENOSCOPY);  Surgeon: Allen Marshall MD;  Location: [facility] ([address] [place] [address]);  Service: Gastroenterology;  Laterality: N/A;    TONSILLECTOMY      TUBAL LIGATION       Family History       Problem Relation (Age of Onset)    Diabetes Mother    Heart disease Mother, Father          Tobacco Use    Smoking status: Never    Smokeless tobacco: Never   Substance and Sexual Activity    Alcohol use: No    Drug use: Yes     Types: Marijuana     Comment: periodically     Sexual activity: Not Currently     Birth control/protection: See Surgical Hx     Review of Systems  Objective:     Vital Signs (Most Recent):  Temp: 98.6 °F (37 °C) (01/11/25 0433)  Pulse: 78 (01/11/25 0433)  Resp: 20 (01/10/25 2308)  BP: (!) 175/93 (01/11/25 0433)  SpO2: 99 % (01/11/25 0433) Vital Signs (24h Range):  Temp:  [98.4 °F (36.9 °C)-99.1 °F (37.3 °C)] 98.6 °F (37 °C)  Pulse:  [78-96] 78  Resp:  [18-20] 20  SpO2:  [96 %-100 %] 99 %  BP: (132-175)/(76-93) 175/93     Weight: 93.4 kg (206 lb)  Body mass index is 37.68 kg/m².    No intake or output data in the 24 hours ending 01/11/25 0800    Physical Exam  HENT:      Head: Normocephalic.   Cardiovascular:      Rate and Rhythm: Normal rate and regular rhythm.      Comments: RIJ port in place  No erythema, induration, or tenderness   Previous LIJ port site well healed  Pulmonary:      Effort: Pulmonary effort is normal.      Breath sounds: Normal breath sounds.   Abdominal:      General: Bowel sounds are normal. There is no distension.      Palpations: Abdomen is soft.   Neurological:      General: No focal deficit present.      Mental Status: She is alert.         Significant Labs:  CBC:   Recent Labs   Lab 01/11/25  0440   WBC 7.08   RBC 3.92*   HGB 8.4*   HCT 25.9*      MCV 66*   MCH 21.4*   MCHC 32.4     CMP:   Recent Labs   Lab 01/11/25  0440   GLU 85   CALCIUM 8.9   ALBUMIN 3.4*   PROT 7.2      K 4.0   CO2 28      BUN 13   CREATININE 1.0   ALKPHOS 76   ALT 16   AST 18    BILITOT 0.2       Significant Diagnostics:  I have reviewed all pertinent imaging results/findings within the past 24 hours.    Assessment/Plan:     44 y.o. female presents with concern for RIJ port site infection. Last dose of Eliquis morning of 1/10    - Patient seen and examined at bedside  - No overt sign of port site infection, but will plan for port removal at bedside 1/12 and sent for culture  - No need to make NPO   - Lidocaine ordered to have at bedside   - Please continue to hold Baileyquis     Robyn Miller MD  General Surgery   PGY-3

## 2025-01-11 NOTE — ASSESSMENT & PLAN NOTE
44-year-old female with sickle cell beta thalassemia zero, multiple pulmonary emboli on chronic AC, asthma, and HTN who presented to [facility] ED 1/4/25 for diffuse myalgias and pain consistent with a sickle cell pain crisis as well as fever, nausea, vomiting, and diarrhea for the past day. She was recently admitted to [facility] 12/29/24 - 1/5/25 for a pain crisis.  CXR without focal consolidation. I am not concerned for acute chest at this time.    Plan  - Multimodal pain regimen: scheduled tylenol, robaxin, and oxycodone PRN   - Dilaudid 3mg IV q3h prn  - mIVF  - Folic acid, hydroxyurea and other home SCD medications reordered  - repeat CXR 1/11/25 without signs of acute chest, atelectasis present will order incentive spirometry

## 2025-01-11 NOTE — ASSESSMENT & PLAN NOTE
-blood cultures with staph epidermis  -due t port placement ID consult placed  -stopped Rocephin and started on Vancomycin   -TTE negative for vegetations   -will need port removal surgery consult placed and planning for 1/13/24   -will discuss after removal and negative cultures placing new port as patient is adamant about wanting a port for possible exchange transfusions in the future.      contact guard

## 2025-01-11 NOTE — PROGRESS NOTES
"[facility]  Progress Note    Patient Name: Nazanin Malone  MRN: [id number]  Patient Class: IP- Inpatient   Admission Date: 1/7/2025  Length of Stay: 3 days  Attending Physician: Deb Quiroz MD  Primary Care Provider: Kezia, Primary Doctor        Subjective     Principal Problem:Vaso-occlusive pain due to sickle cell disease        HPI:  Nazanin Malone is a 44-year-old female with sickle cell beta thalassemia zero, multiple pulmonary emboli on chronic AC, asthma, and HTN who presented to [facility] ED 1/4/25 for diffuse myalgias and pain consistent with a sickle cell pain crisis as well as fever, nausea, vomiting, and diarrhea for the past day. She was recently admitted to [facility] 12/29/24 - 1/5/25 for pain c/w sickle cell pain crises which was refractory to her home regimen of tylenol, oxycodone 15mg, and muscle relaxers. She reported that she moved to the area about 6 weeks ago after living in [place]. She was receiving chronic exchange transfusions there but is not currently receiving them as part of her therapy here. She is seeing a hematologist. She also reports that she previously was on MS contin, pen VK, and hydroxurea as part of her home regimen but has not been put back on those since she has moved here. Of note, during that recent admission, the provider was informed by nursing that the patient asked if she could be given a little more medicine than what was prescribed stating "no one has to know". She also asked if nursing needed some help to throw away an empty flush syringe and an empty med syringe. At that time, she was prescribed Dilaudid 3mg IV q3h PRN and IVF with improvement in her pain.    In the ED, /99 HR 74 RR 20 sats adequate on ambient air. Tmax 100.9. Labs notable for Hgb 9.8 and K 2.9. LA wnl. COVID, Flu, and RSV negative. Blood cultures were obtained. UA was ordered. CXR showed . Received Dilaudid 3mg IV in total, Tylenol 1g, Benadryl 25mg PO, Zofran 4mg IV, " and potassium 25mEq PO.    The patient was admitted to Hospital Medicine for further workup and management.    Overview/Hospital Course:  No notes on file    Interval History: Pt seen and examined this morning on rounds. GINIYUEON. Still having shoulder and right upper chest discomfort. Care plan reviewed. Otherwise, doing well and with no further complaints at this time.        Objective:     Vital Signs (Most Recent):  Temp: 98.6 °F (37 °C) (01/11/25 0433)  Pulse: 78 (01/11/25 0433)  Resp: 18 (01/11/25 0915)  BP: (!) 175/93 (01/11/25 0433)  SpO2: 99 % (01/11/25 0433) Vital Signs (24h Range):  Temp:  [98.4 °F (36.9 °C)-99.1 °F (37.3 °C)] 98.6 °F (37 °C)  Pulse:  [78-96] 78  Resp:  [18-20] 18  SpO2:  [96 %-100 %] 99 %  BP: (132-175)/(76-93) 175/93     Weight: 93.4 kg (206 lb)  Body mass index is 37.68 kg/m².  No intake or output data in the 24 hours ending 01/11/25 1112      Physical Exam  Constitutional:       Appearance: Normal appearance.   HENT:      Head: Normocephalic and atraumatic.      Nose: Nose normal.      Mouth/Throat:      Mouth: Mucous membranes are moist.   Eyes:      Extraocular Movements: Extraocular movements intact.      Pupils: Pupils are equal, round, and reactive to light.   Cardiovascular:      Rate and Rhythm: Normal rate and regular rhythm.      Heart sounds: No murmur heard.     No gallop.   Pulmonary:      Effort: Pulmonary effort is normal.      Breath sounds: Normal breath sounds. No wheezing or rales.   Abdominal:      General: Abdomen is flat. Bowel sounds are normal. There is no distension.      Palpations: Abdomen is soft.      Tenderness: There is no abdominal tenderness.   Musculoskeletal:         General: No swelling or tenderness. Normal range of motion.      Cervical back: Normal range of motion and neck supple.      Right lower leg: No edema.      Left lower leg: No edema.   Skin:     General: Skin is warm and dry.      Capillary Refill: Capillary refill takes less than 2 seconds.       Comments: Scarring present on the right leg   Port in place c/d/I. No overt signs of infection    Neurological:      General: No focal deficit present.      Mental Status: She is alert. Mental status is at baseline.   Psychiatric:         Mood and Affect: Mood normal.             Significant Labs: All pertinent labs within the past 24 hours have been reviewed.    Significant Imaging: I have reviewed all pertinent imaging results/findings within the past 24 hours.    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 ED 1/4/25 for diffuse myalgias and pain consistent with a sickle cell pain crisis as well as fever, nausea, vomiting, and diarrhea for the past day. She was recently admitted to [facility] 12/29/24 - 1/5/25 for a pain crisis.  CXR without focal consolidation. I am not concerned for acute chest at this time.    Plan  - Multimodal pain regimen: scheduled tylenol, robaxin, and oxycodone PRN   - Dilaudid 3mg IV q3h prn  - mIVF  - Folic acid, hydroxyurea and other home SCD medications reordered  - repeat CXR 1/11/25 without signs of acute chest, atelectasis present will order incentive spirometry     Bacteremia due to Staphylococcus epidermidis  -blood cultures with staph epidermis  -due t port placement ID consult placed  -stopped Rocephin and started on Vancomycin   -TTE negative for vegetations   -will need port removal surgery consult placed and planning for 1/13/24   -will discuss after removal and negative cultures placing new port as patient is adamant about wanting a port for possible exchange transfusions in the future.       Fever  Tmax 100.6 in the ED.    Plan  -see bacteremia plan       Diarrhea  Resolved     Plan  - C diff unable to be ordered       Chronic anticoagulation  See History of PE      History of pulmonary embolism  Chronic and  stable.    Plan  - Continue home Eliquis  - Consider CTA for pleuritic pain and hypoxia      Hypertension  Patient's blood pressure range in the last 24 hours was: BP  Min: 119/81  Max: 153/83.The patient's inpatient anti-hypertensive regimen is listed below:  Current Antihypertensives  amLODIPine tablet 10 mg, Daily, Oral    Plan  - BP is controlled, no changes needed to their regimen    Intermittent asthma  Chronic and stable. Not in respiratory distress.    Plan  - Continue home albuterol PRN      Chronic prescription opiate use  Uses  tylenol, oxycodone 15mg, and muscle relaxers. Pain has been refractory to these due to reported emesis.    Plan  - See Vaso-occlusive pain      Chronic gastritis  Chronic and stable.    Plan  - Continue home PPI and carafate      Chest wall pain  -pain on the left side of the chest and shoulder area  -worse with palpation   -EKG and troponin negative   -echo without significant abnormalities   -seems to be MSK related, if worsens or vital sign changes or hypoxia will consider CTPE         VTE Risk Mitigation (From admission, onward)           Ordered     IP VTE HIGH RISK PATIENT  Once         01/07/25 2326     Place sequential compression device  Until discontinued         01/07/25 2326                    Discharge Planning   ALYSE: 1/12/2025     Code Status: Full Code   Medical Readiness for Discharge Date:   Discharge Plan A: Home with family   Discharge Delays: None known at this time                    Deb Quiroz MD  Department of Hospital Medicine   [facility]

## 2025-01-11 NOTE — PLAN OF CARE
Chart reviewed - blcx with staph cap,with repeat no growth to date. Pending potential port removal today with GS. Continue vancomycin pharm to dose.     ID will continue to follow.

## 2025-01-12 LAB
ALBUMIN SERPL BCP-MCNC: 3.3 G/DL (ref 3.5–5.2)
ALP SERPL-CCNC: 74 U/L (ref 40–150)
ALT SERPL W/O P-5'-P-CCNC: 13 U/L (ref 10–44)
ANION GAP SERPL CALC-SCNC: 7 MMOL/L (ref 8–16)
AST SERPL-CCNC: 15 U/L (ref 10–40)
BASOPHILS # BLD AUTO: 0.06 K/UL (ref 0–0.2)
BASOPHILS NFR BLD: 1 % (ref 0–1.9)
BILIRUB SERPL-MCNC: 0.2 MG/DL (ref 0.1–1)
BUN SERPL-MCNC: 15 MG/DL (ref 6–20)
CALCIUM SERPL-MCNC: 8.8 MG/DL (ref 8.7–10.5)
CHLORIDE SERPL-SCNC: 108 MMOL/L (ref 95–110)
CO2 SERPL-SCNC: 25 MMOL/L (ref 23–29)
CREAT SERPL-MCNC: 1 MG/DL (ref 0.5–1.4)
DIFFERENTIAL METHOD BLD: ABNORMAL
EOSINOPHIL # BLD AUTO: 0.4 K/UL (ref 0–0.5)
EOSINOPHIL NFR BLD: 6.6 % (ref 0–8)
ERYTHROCYTE [DISTWIDTH] IN BLOOD BY AUTOMATED COUNT: 21.2 % (ref 11.5–14.5)
EST. GFR  (NO RACE VARIABLE): >60 ML/MIN/1.73 M^2
GLUCOSE SERPL-MCNC: 75 MG/DL (ref 70–110)
HCT VFR BLD AUTO: 25.6 % (ref 37–48.5)
HGB BLD-MCNC: 8.2 G/DL (ref 12–16)
IMM GRANULOCYTES # BLD AUTO: 0.01 K/UL (ref 0–0.04)
IMM GRANULOCYTES NFR BLD AUTO: 0.2 % (ref 0–0.5)
LYMPHOCYTES # BLD AUTO: 3.6 K/UL (ref 1–4.8)
LYMPHOCYTES NFR BLD: 62.6 % (ref 18–48)
MAGNESIUM SERPL-MCNC: 1.8 MG/DL (ref 1.6–2.6)
MCH RBC QN AUTO: 20.8 PG (ref 27–31)
MCHC RBC AUTO-ENTMCNC: 32 G/DL (ref 32–36)
MCV RBC AUTO: 65 FL (ref 82–98)
MONOCYTES # BLD AUTO: 0.7 K/UL (ref 0.3–1)
MONOCYTES NFR BLD: 12.5 % (ref 4–15)
NEUTROPHILS # BLD AUTO: 1 K/UL (ref 1.8–7.7)
NEUTROPHILS NFR BLD: 17.1 % (ref 38–73)
NRBC BLD-RTO: 1 /100 WBC
PHOSPHATE SERPL-MCNC: 3.1 MG/DL (ref 2.7–4.5)
PLATELET # BLD AUTO: 293 K/UL (ref 150–450)
PMV BLD AUTO: 9.9 FL (ref 9.2–12.9)
POTASSIUM SERPL-SCNC: 3.8 MMOL/L (ref 3.5–5.1)
PROT SERPL-MCNC: 6.9 G/DL (ref 6–8.4)
RBC # BLD AUTO: 3.94 M/UL (ref 4–5.4)
SODIUM SERPL-SCNC: 140 MMOL/L (ref 136–145)
VANCOMYCIN TROUGH SERPL-MCNC: 7.9 UG/ML (ref 10–22)
WBC # BLD AUTO: 5.78 K/UL (ref 3.9–12.7)

## 2025-01-12 PROCEDURE — 02PYX3Z REMOVAL OF INFUSION DEVICE FROM GREAT VESSEL, EXTERNAL APPROACH: ICD-10-PCS | Performed by: SURGERY

## 2025-01-12 PROCEDURE — 25000003 PHARM REV CODE 250

## 2025-01-12 PROCEDURE — 63600175 PHARM REV CODE 636 W HCPCS: Mod: JZ,TB | Performed by: STUDENT IN AN ORGANIZED HEALTH CARE EDUCATION/TRAINING PROGRAM

## 2025-01-12 PROCEDURE — 11000001 HC ACUTE MED/SURG PRIVATE ROOM

## 2025-01-12 PROCEDURE — 80053 COMPREHEN METABOLIC PANEL: CPT

## 2025-01-12 PROCEDURE — 84100 ASSAY OF PHOSPHORUS: CPT

## 2025-01-12 PROCEDURE — 0JPT0WZ REMOVAL OF TOTALLY IMPLANTABLE VASCULAR ACCESS DEVICE FROM TRUNK SUBCUTANEOUS TISSUE AND FASCIA, OPEN APPROACH: ICD-10-PCS | Performed by: SURGERY

## 2025-01-12 PROCEDURE — 36415 COLL VENOUS BLD VENIPUNCTURE: CPT

## 2025-01-12 PROCEDURE — 87040 BLOOD CULTURE FOR BACTERIA: CPT | Mod: 59 | Performed by: STUDENT IN AN ORGANIZED HEALTH CARE EDUCATION/TRAINING PROGRAM

## 2025-01-12 PROCEDURE — 85025 COMPLETE CBC W/AUTO DIFF WBC: CPT

## 2025-01-12 PROCEDURE — 36415 COLL VENOUS BLD VENIPUNCTURE: CPT | Performed by: STUDENT IN AN ORGANIZED HEALTH CARE EDUCATION/TRAINING PROGRAM

## 2025-01-12 PROCEDURE — 63600175 PHARM REV CODE 636 W HCPCS: Performed by: INTERNAL MEDICINE

## 2025-01-12 PROCEDURE — 83735 ASSAY OF MAGNESIUM: CPT

## 2025-01-12 PROCEDURE — 25000003 PHARM REV CODE 250: Performed by: STUDENT IN AN ORGANIZED HEALTH CARE EDUCATION/TRAINING PROGRAM

## 2025-01-12 PROCEDURE — 99233 SBSQ HOSP IP/OBS HIGH 50: CPT | Mod: GC,,, | Performed by: STUDENT IN AN ORGANIZED HEALTH CARE EDUCATION/TRAINING PROGRAM

## 2025-01-12 PROCEDURE — 80202 ASSAY OF VANCOMYCIN: CPT | Performed by: STUDENT IN AN ORGANIZED HEALTH CARE EDUCATION/TRAINING PROGRAM

## 2025-01-12 RX ORDER — DIPHENHYDRAMINE HYDROCHLORIDE 50 MG/ML
50 INJECTION INTRAMUSCULAR; INTRAVENOUS ONCE
Status: COMPLETED | OUTPATIENT
Start: 2025-01-12 | End: 2025-01-12

## 2025-01-12 RX ADMIN — HYDROMORPHONE HYDROCHLORIDE 3 MG: 1 INJECTION, SOLUTION INTRAMUSCULAR; INTRAVENOUS; SUBCUTANEOUS at 12:01

## 2025-01-12 RX ADMIN — APIXABAN 5 MG: 5 TABLET, FILM COATED ORAL at 08:01

## 2025-01-12 RX ADMIN — AMLODIPINE BESYLATE 10 MG: 10 TABLET ORAL at 08:01

## 2025-01-12 RX ADMIN — SUCRALFATE 1 G: 1 TABLET ORAL at 08:01

## 2025-01-12 RX ADMIN — METHOCARBAMOL 1000 MG: 500 TABLET ORAL at 08:01

## 2025-01-12 RX ADMIN — HYDROMORPHONE HYDROCHLORIDE 3 MG: 1 INJECTION, SOLUTION INTRAMUSCULAR; INTRAVENOUS; SUBCUTANEOUS at 03:01

## 2025-01-12 RX ADMIN — SENNOSIDES AND DOCUSATE SODIUM 1 TABLET: 50; 8.6 TABLET ORAL at 08:01

## 2025-01-12 RX ADMIN — DIPHENHYDRAMINE HYDROCHLORIDE 25 MG: 50 INJECTION, SOLUTION INTRAMUSCULAR; INTRAVENOUS at 08:01

## 2025-01-12 RX ADMIN — GABAPENTIN 600 MG: 300 CAPSULE ORAL at 08:01

## 2025-01-12 RX ADMIN — HYDROMORPHONE HYDROCHLORIDE 3 MG: 1 INJECTION, SOLUTION INTRAMUSCULAR; INTRAVENOUS; SUBCUTANEOUS at 08:01

## 2025-01-12 RX ADMIN — DIPHENHYDRAMINE HYDROCHLORIDE 25 MG: 50 INJECTION, SOLUTION INTRAMUSCULAR; INTRAVENOUS at 03:01

## 2025-01-12 RX ADMIN — HYDROXYUREA 1000 MG: 500 CAPSULE ORAL at 08:01

## 2025-01-12 RX ADMIN — OXYCODONE 15 MG: 5 TABLET ORAL at 06:01

## 2025-01-12 RX ADMIN — SUCRALFATE 1 G: 1 TABLET ORAL at 12:01

## 2025-01-12 RX ADMIN — MUPIROCIN: 20 OINTMENT TOPICAL at 12:01

## 2025-01-12 RX ADMIN — VANCOMYCIN HYDROCHLORIDE 1250 MG: 1.25 INJECTION, POWDER, LYOPHILIZED, FOR SOLUTION INTRAVENOUS at 03:01

## 2025-01-12 RX ADMIN — POLYETHYLENE GLYCOL 3350 17 G: 17 POWDER, FOR SOLUTION ORAL at 12:01

## 2025-01-12 RX ADMIN — MUPIROCIN: 20 OINTMENT TOPICAL at 08:01

## 2025-01-12 RX ADMIN — METHOCARBAMOL 1000 MG: 500 TABLET ORAL at 03:01

## 2025-01-12 RX ADMIN — FOLIC ACID 1 MG: 1 TABLET ORAL at 08:01

## 2025-01-12 RX ADMIN — PANTOPRAZOLE SODIUM 40 MG: 40 TABLET, DELAYED RELEASE ORAL at 08:01

## 2025-01-12 RX ADMIN — GABAPENTIN 600 MG: 300 CAPSULE ORAL at 03:01

## 2025-01-12 RX ADMIN — DIPHENHYDRAMINE HYDROCHLORIDE 50 MG: 50 INJECTION, SOLUTION INTRAMUSCULAR; INTRAVENOUS at 08:01

## 2025-01-12 RX ADMIN — OXYCODONE 15 MG: 5 TABLET ORAL at 10:01

## 2025-01-12 RX ADMIN — DIPHENHYDRAMINE HYDROCHLORIDE 50 MG: 50 INJECTION, SOLUTION INTRAMUSCULAR; INTRAVENOUS at 12:01

## 2025-01-12 RX ADMIN — ACETAMINOPHEN 975 MG: 325 TABLET ORAL at 03:01

## 2025-01-12 RX ADMIN — OXYCODONE 15 MG: 5 TABLET ORAL at 01:01

## 2025-01-12 RX ADMIN — DIPHENHYDRAMINE HYDROCHLORIDE 25 MG: 50 INJECTION, SOLUTION INTRAMUSCULAR; INTRAVENOUS at 04:01

## 2025-01-12 RX ADMIN — HYDROMORPHONE HYDROCHLORIDE 3 MG: 1 INJECTION, SOLUTION INTRAMUSCULAR; INTRAVENOUS; SUBCUTANEOUS at 04:01

## 2025-01-12 RX ADMIN — ACETAMINOPHEN 975 MG: 325 TABLET ORAL at 08:01

## 2025-01-12 RX ADMIN — SUCRALFATE 1 G: 1 TABLET ORAL at 06:01

## 2025-01-12 RX ADMIN — SUCRALFATE 1 G: 1 TABLET ORAL at 04:01

## 2025-01-12 NOTE — ASSESSMENT & PLAN NOTE
CoNS (methicillin sensitive) identified BCX 1/7. Started on vancomycin 1/8, has been on CTX 2g q24h since 1/7. No new murmur. Per patient, noted that port site occasionally develops welts/lesions around it with concurrent erythema, though port site today appears c/d/I with no induration. Afebrile since 1/8. CoNS speciation with susceptibilities pending, will need to treat with Vancomycin until then. Given patient description of skin lesions/erythema around port PTA with Tmax 102 at home, favor removing port and allowing for line holiday prior to consideration of replacement.    Staph capitis bacteremia, nidus most likely port. BCX 1/9 NGTD. GSY will collect port tip culture during removal.  Staph capitis sensitive to oxacillin    - Ancef 2g q8h for Staph capitis bacteremia, duration 1 week from day of port removal (1/12-1/19)  - OPAT to follow  - Appreciate GSY assistance for port removal  - Plan discussed with primary

## 2025-01-12 NOTE — PROGRESS NOTES
Pharmacokinetic Assessment Follow Up: IV Vancomycin    Vancomycin serum concentration assessment(s):    -The trough level was drawn correctly and can be used to guide therapy at this time.   -The measurement is below the desired definitive target range of 15 to 20 mcg/mL.  - Per ID plan for 7 days of therapy post port removal (1/12-1/19) with vancomycin    Vancomycin Regimen Plan:    -Change regimen to Vancomycin 1250 mg IV every 12 hours  -Next serum trough concentration measured 1 hour prior to 4th dose on 12/14 at 0200      Drug levels (last 3 results):  Recent Labs   Lab Result Units 01/12/25  1328   Vancomycin-Trough ug/mL 7.9*       Pharmacy will continue to follow and monitor vancomycin.    Please contact pharmacy at extension [phone] for questions regarding this assessment.    Thank you for the consult,   Pee Marquez       Patient brief summary:  Nazanin Malone is a 44 y.o. female initiated on antimicrobial therapy with IV Vancomycin for treatment of bacteremia    The patient's current regimen is Vancomycin 1500mg Q24h    Drug Allergies:   Review of patient's allergies indicates:   Allergen Reactions    Cat dander Anaphylaxis, Itching and Shortness Of Breath    Nsaids (non-steroidal anti-inflammatory drug) Itching and Anaphylaxis    Latex Rash       Actual Body Weight:   93.4 kg    Renal Function:   Estimated Creatinine Clearance: 74.8 mL/min (based on SCr of 1 mg/dL).,     Dialysis Method (if applicable):  N/A    CBC (last 72 hours):  Recent Labs   Lab Result Units 01/10/25  0352 01/11/25  0440 01/12/25  0440   WBC K/uL 6.86 7.08 5.78   Hemoglobin g/dL 8.7* 8.4* 8.2*   Hematocrit % 28.1* 25.9* 25.6*   Platelets K/uL 286 313 293   Gran % % 34.5* 24.2* 17.1*   Lymph % % 49.7* 59.0* 62.6*   Mono % % 9.8 9.9 12.5   Eosinophil % % 4.7 5.8 6.6   Basophil % % 1.0 1.0 1.0   Differential Method  Automated Automated Automated       Metabolic Panel (last 72 hours):  Recent Labs   Lab Result Units 01/10/25  0352  01/11/25  0440 01/12/25  0440   Sodium mmol/L 138 141 140   Potassium mmol/L 4.1 4.0 3.8   Chloride mmol/L 108 106 108   CO2 mmol/L 22* 28 25   Glucose mg/dL 73 85 75   BUN mg/dL 14 13 15   Creatinine mg/dL 1.1 1.0 1.0   Albumin g/dL 3.4* 3.4* 3.3*   Total Bilirubin mg/dL 0.2 0.2 0.2   Alkaline Phosphatase U/L 80 76 74   AST U/L 31 18 15   ALT U/L 18 16 13   Magnesium mg/dL 1.8 1.8 1.8   Phosphorus mg/dL 3.8 3.3 3.1       Vancomycin Administrations:  vancomycin given in the last 96 hours                     vancomycin 1,500 mg in 0.9% NaCl 250 mL IVPB (admixture device) (mg) 1,500 mg New Bag 01/11/25 1247    vancomycin (VANCOCIN) 2,250 mg in 0.9% NaCl 500 mL IVPB (mg) 2,250 mg New Bag 01/10/25 1235    vancomycin 2 g in 0.9% sodium chloride 500 mL IVPB (mg) 2,000 mg New Bag 01/08/25 2302                    Microbiologic Results:  Microbiology Results (last 7 days)       Procedure Component Value Units Date/Time    Blood culture [[id number]] Collected: 01/12/25 1328    Order Status: Sent Specimen: Blood from Peripheral, Hand, Left Updated: 01/12/25 1415    Narrative:      Collection has been rescheduled by JOT at 01/12/2025 11:04 Reason:   Doctor requested to hold off going to add more labs   Collection has been rescheduled by JOT at 01/12/2025 11:04 Reason:   Doctor requested to hold off going to add more labs     Blood culture [[id number]] Collected: 01/12/25 1344    Order Status: Sent Specimen: Blood from Peripheral, Wrist, Right Updated: 01/12/25 1415    Narrative:      Collection has been rescheduled by JOT at 01/12/2025 11:04 Reason:   Doctor requested to hold off going to add more labs   Collection has been rescheduled by JOT at 01/12/2025 11:04 Reason:   Doctor requested to hold off going to add more labs     Blood culture #1 **CANNOT BE ORDERED STAT** [[id number]]  (Abnormal)  (Susceptibility) Collected: 01/07/25 1934    Order Status: Completed Specimen: Blood from Peripheral, Hand, Right Updated: 01/12/25  0955     Blood Culture, Routine Gram stain aer bottle: Gram positive cocci in clusters resembling Staph      Results called to and read back by: Rachel BLANCO 01/09/2025  04:21      Gram stain kimberly bottle: Gram positive cocci in clusters resembling Staph      Positive results previously called 01/09/2025  15:07      STAPHYLOCOCCUS CAPITIS    Blood culture #2 **CANNOT BE ORDERED STAT** [[id number]]  (Abnormal)  (Susceptibility) Collected: 01/07/25 1934    Order Status: Completed Specimen: Blood from Peripheral, Hand, Left Updated: 01/12/25 0954     Blood Culture, Routine Gram stain aer bottle: Gram positive cocci in clusters resembling Staph      Results called to and read back by:Belia Moses RN 01/08/2025      21:02      Gram stain kimberly bottle: Gram positive cocci in clusters resembling Staph      Positive results previously called 01/09/2025  15:06      COAGULASE-NEGATIVE STAPHYLOCOCCUS SPECIES  Organism is a probable contaminant        STAPHYLOCOCCUS CAPITIS    Blood culture [[id number]] Collected: 01/09/25 0533    Order Status: Completed Specimen: Blood from Peripheral, Antecubital, Right Updated: 01/12/25 0812     Blood Culture, Routine No Growth to date      No Growth to date      No Growth to date      No Growth to date    Blood culture [[id number]] Collected: 01/09/25 0533    Order Status: Completed Specimen: Blood from Peripheral, Lower Arm, Right Updated: 01/12/25 0812     Blood Culture, Routine No Growth to date      No Growth to date      No Growth to date      No Growth to date    MRSA/SA Rapid ID by PCR from Blood culture [[id number]] Collected: 01/07/25 1934    Order Status: Completed Updated: 01/08/25 2227     Staph aureus ID by PCR Negative     Methicillin Resistant ID by PCR Negative    Clostridium difficile EIA [[id number]]     Order Status: Canceled Specimen: Stool

## 2025-01-12 NOTE — ASSESSMENT & PLAN NOTE
44-year-old female with sickle cell beta thalassemia zero, multiple pulmonary emboli on chronic AC, asthma, and HTN who presented to [facility] ED 1/4/25 for diffuse myalgias and pain consistent with a sickle cell pain crisis as well as fever, nausea, vomiting, and diarrhea for the past day. She was recently admitted to [facility] 12/29/24 - 1/5/25 for a pain crisis.  CXR without focal consolidation. I am not concerned for acute chest at this time.    Plan  - Multimodal pain regimen: scheduled tylenol, robaxin, and oxycodone PRN   - Dilaudid 3mg IV q3h prn, will wean 1/13/25 to dilaudid 2 mg q4h   - mIVF  - Folic acid, hydroxyurea and other home SCD medications reordered  - repeat CXR 1/11/25 without signs of acute chest, atelectasis present will order incentive spirometry

## 2025-01-12 NOTE — ASSESSMENT & PLAN NOTE
-blood cultures with staph epidermis and another CONS   -due to port placement ID consult placed  -stopped Rocephin and started on Vancomycin   -TTE negative for vegetations   -s/p Port removal with General Surgery 1/12/25  -repeat BC NGTD  -BC ordered 1/12/25  -if cultures remain negative x48 h possible for placement of new port. Will discuss with HO team about this   -planning for Vancomycin with ALYSE 1/18/25

## 2025-01-12 NOTE — ASSESSMENT & PLAN NOTE
Patient's blood pressure range in the last 24 hours was: BP  Min: 136/86  Max: 178/94.The patient's inpatient anti-hypertensive regimen is listed below:  Current Antihypertensives  amLODIPine tablet 10 mg, Daily, Oral    Plan  - BP is controlled, no changes needed to their regimen

## 2025-01-12 NOTE — PROGRESS NOTES
[facility]  Infectious Disease  Progress Note    Patient Name: Nazanin Malone  MRN: [id number]  Admission Date: 1/7/2025  Length of Stay: 4 days  Attending Physician: Deb Quiroz MD  Primary Care Provider: No, Primary Doctor    Isolation Status: No active isolations  Assessment/Plan:      ID  Bacteremia due to Staphylococcus epidermidis  CoNS (methicillin sensitive) identified BCX 1/7. Started on vancomycin 1/8, has been on CTX 2g q24h since 1/7. No new murmur. Per patient, noted that port site occasionally develops welts/lesions around it with concurrent erythema, though port site today appears c/d/I with no induration. Afebrile since 1/8. CoNS speciation with susceptibilities pending, will need to treat with Vancomycin until then. Given patient description of skin lesions/erythema around port PTA with Tmax 102 at home, favor removing port and allowing for line holiday prior to consideration of replacement.    Staph capitis bacteremia, nidus most likely port. BCX 1/9 NGTD. GSY will collect port tip culture during removal.  Staph capitis sensitive to oxacillin    - Ancef 2g q8h for Staph capitis bacteremia, duration 1 week from day of port removal (1/12-1/19)  - OPAT to follow  - Appreciate GSY assistance for port removal  - Plan discussed with primary         Anticipated Disposition: Home    Thank you for your consult. I will sign off. Please contact us if you have any additional questions.    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 Since admission, normotensive with Tmax 100.9 on 1/7, however  afebrile since 1/8. Labs notable for Hgb 9.8 and K 2.9. CXR NAIPP. COVID, Flu, and RSV negative. Blood cultures were obtained 1/7 which grew CoNS in both sets. BCX recollected 1/9. Patient states continued subjective fever/chills. Vomiting and diarrhea have improved. She strongly prefers to retain her port (in place for 2yr, no recent exchange). No focal joint pain or spinal pain. ID consulted for CoNS bacteremia in setting of port.   Interval History: Aferbile, VSS. WBC 5.7. Staph capitis. Port removal with GSY today.     Susceptibility Staph capitis pending.       Objective:     Vital Signs (Most Recent):  Temp: 98.2 °F (36.8 °C) (01/12/25 0726)  Pulse: 75 (01/12/25 0726)  Resp: 18 (01/12/25 0726)  BP: (!) 145/85 (01/12/25 0726)  SpO2: 98 % (01/12/25 0726) Vital Signs (24h Range):  Temp:  [98 °F (36.7 °C)-98.8 °F (37.1 °C)] 98.2 °F (36.8 °C)  Pulse:  [75-98] 75  Resp:  [18-20] 18  SpO2:  [95 %-98 %] 98 %  BP: (118-178)/(85-99) 145/85     Weight: 93.4 kg (206 lb)  Body mass index is 37.68 kg/m².    Estimated Creatinine Clearance: 74.8 mL/min (based on SCr of 1 mg/dL).     Physical Exam  Constitutional:       Appearance: Normal appearance.   HENT:      Head: Normocephalic and atraumatic.      Nose: Nose normal.      Mouth/Throat:      Mouth: Mucous membranes are moist.   Eyes:      Extraocular Movements: Extraocular movements intact.      Pupils: Pupils are equal, round, and reactive to light.   Cardiovascular:      Rate and Rhythm: Normal rate and regular rhythm.      Heart sounds: No murmur heard.     No gallop.   Pulmonary:      Effort: Pulmonary effort is normal.      Breath sounds: Normal breath sounds. No wheezing or rales.   Abdominal:      General: Abdomen is flat. Bowel sounds are normal. There is no distension.      Palpations: Abdomen is soft.      Tenderness: There is no abdominal tenderness.   Musculoskeletal:         General: No swelling or tenderness. Normal range of motion.      Cervical back: Normal  range of motion and neck supple.      Right lower leg: No edema.      Left lower leg: No edema.      Comments: Port site on R chest without erythema, induration, fluctuance, or tenderness.   Previous L port site scar well healed   Skin:     General: Skin is warm and dry.      Capillary Refill: Capillary refill takes less than 2 seconds.      Comments: Scarring present on the right leg    Neurological:      General: No focal deficit present.      Mental Status: She is alert. Mental status is at baseline.   Psychiatric:         Mood and Affect: Mood normal.          Significant Labs: Blood Culture:   Recent Labs   Lab 10/21/24  0516 11/12/24  1802 11/12/24  1805 01/07/25  1934 01/09/25  0533   LABBLOO No growth after 5 days. No growth after 5 days. No growth after 5 days. Gram stain aer bottle: Gram positive cocci in clusters resembling Staph  Results called to and read back by: Rachel BLANCO 01/09/2025  04:21  Gram stain kimberly bottle: Gram positive cocci in clusters resembling Staph  Positive results previously called 01/09/2025  15:07  STAPHYLOCOCCUS CAPITIS  Susceptibility pending  *  Gram stain aer bottle: Gram positive cocci in clusters resembling Staph  Results called to and read back by:Belia Moses RN 01/08/2025  21:02  Gram stain kimberly bottle: Gram positive cocci in clusters resembling Staph  Positive results previously called 01/09/2025  15:06  COAGULASE-NEGATIVE STAPHYLOCOCCUS SPECIES  Organism is a probable contaminant  *  STAPHYLOCOCCUS CAPITIS  Susceptibility pending  * No Growth to date  No Growth to date  No Growth to date  No Growth to date  No Growth to date  No Growth to date     CBC:   Recent Labs   Lab 01/11/25  0440 01/12/25  0440   WBC 7.08 5.78   HGB 8.4* 8.2*   HCT 25.9* 25.6*    293     CMP:   Recent Labs   Lab 01/11/25  0440 01/12/25  0440    140   K 4.0 3.8    108   CO2 28 25   GLU 85 75   BUN 13 15   CREATININE 1.0 1.0   CALCIUM 8.9 8.8   PROT 7.2 6.9    ALBUMIN 3.4* 3.3*   BILITOT 0.2 0.2   ALKPHOS 76 74   AST 18 15   ALT 16 13   ANIONGAP 7* 7*       Significant Imaging: Pertinent imaging reviewed

## 2025-01-12 NOTE — PROCEDURES
Permacath Removal Procedure Note         Procedure:   Right permacath removal     Date: 01/12/2025     Location: right internal jugular vein     Anesthesia: Local       Procedure:  After verbal and written consent was obtained, a timeout was performed. Patient was placed supine and in trendelenburg. The patient was prepped and draped in sterile fashion. A 15 blade was used to make an incision approximately 4-5 cm in length directly overlying the Permacath access site. The Permacath cuff was dissected and catheter was removed without any resistance. A U-stitch with 3-0 vicryl was used to close the tract. Pressure was held at the IJ insertion site for 5 minutes. The Permacath was placed in a specimen cup. The skin was loosely approximated with 4-0 nylon suture. The patient tolerated the procedure well.       Complications/Comments:  none     Estimated Blood Loss: 5 cc      General Surgery will sign off. Please call with questions or concerns.       Humberto Colvin MD   General Surgery PGY-1

## 2025-01-12 NOTE — PLAN OF CARE
Problem: Adult Inpatient Plan of Care  Goal: Plan of Care Review  Outcome: Progressing  Goal: Patient-Specific Goal (Individualized)  Outcome: Progressing  Goal: Absence of Hospital-Acquired Illness or Injury  Outcome: Progressing  Goal: Optimal Comfort and Wellbeing  Outcome: Progressing  Goal: Readiness for Transition of Care  Outcome: Progressing     Problem: Sickle Cell Disease  Goal: Optimal Cerebral Tissue Perfusion  Outcome: Progressing  Goal: Optimal Coping with Sickle Cell Disease  Outcome: Progressing  Goal: Effective Tissue Perfusion  Outcome: Progressing  Goal: Absence of Infection Signs and Symptoms  Outcome: Progressing  Goal: Optimal Pain Control and Function  Outcome: Progressing  Goal: Optimal Oxygenation  Outcome: Progressing     Problem: Infection  Goal: Absence of Infection Signs and Symptoms  Outcome: Progressing     Problem: Fall Injury Risk  Goal: Absence of Fall and Fall-Related Injury  Outcome: Progressing     Problem: Pain Chronic (Persistent)  Goal: Optimal Pain Control and Function  Outcome: Progressing

## 2025-01-12 NOTE — PROGRESS NOTES
"[facility]  Progress Note    Patient Name: Nazanin Malone  MRN: [id number]  Patient Class: IP- Inpatient   Admission Date: 1/7/2025  Length of Stay: 4 days  Attending Physician: Deb Quiroz MD  Primary Care Provider: Kezia, Primary Doctor        Subjective     Principal Problem:Vaso-occlusive pain due to sickle cell disease        HPI:  Nazanin Malone is a 44-year-old female with sickle cell beta thalassemia zero, multiple pulmonary emboli on chronic AC, asthma, and HTN who [place] [facility] [place]4/25 for diffuse myalgias and pain consistent with a sickle cell pain crisis as well as fever, nausea, vomiting, and diarrhea for the past day. She was recently [place] [facility] [place]24 - 1/5/25 for pain c/w sickle cell pain crises which was refractory to her home regimen of tylenol, oxycodone 15mg, and muscle relaxers. She reported that she moved to the area about 6 weeks ago after living in [place]. She was receiving chronic exchange transfusions there but is not currently receiving them as part of her therapy here. She is seeing a hematologist. She also reports that she previously was on MS contin, pen VK, and hydroxurea as part of her home regimen but has not been put back on those since she has moved here. Of note, during that recent admission, the provider was informed by nursing that the patient asked if she could be given a little more medicine than what was prescribed stating "no one has to know". She also asked if nursing needed some help to throw away an empty flush syringe and an empty med syringe. At that time, she was prescribed Dilaudid 3mg IV q3h PRN and IVF with improvement in her pain.    In the ED, /99 HR 74 RR 20 sats adequate on ambient air. Tmax 100.9. Labs notable for Hgb 9.8 and K 2.9. LA wnl. COVID, Flu, and RSV negative. Blood cultures were obtained. UA was ordered. CXR showed . Received Dilaudid 3mg IV in total, Tylenol 1g, Benadryl 25mg PO, Zofran 4mg IV, " and potassium 25mEq PO.    The patient was admitted to Hospital Medicine for further workup and management.    Overview/Hospital Course:  No notes on file    Interval History: Pt seen and examined this morning on rounds. SANCHO. Port removed without issues. She is having pain and itching. Care plan reviewed. Otherwise, doing well and with no further complaints at this time.        Objective:     Vital Signs (Most Recent):  Temp: 98.8 °F (37.1 °C) (01/12/25 1202)  Pulse: 87 (01/12/25 1202)  Resp: 18 (01/12/25 1226)  BP: 136/86 (01/12/25 1202)  SpO2: 98 % (01/12/25 1202) Vital Signs (24h Range):  Temp:  [98 °F (36.7 °C)-98.8 °F (37.1 °C)] 98.8 °F (37.1 °C)  Pulse:  [75-98] 87  Resp:  [18-20] 18  SpO2:  [97 %-98 %] 98 %  BP: (136-178)/(85-99) 136/86     Weight: 93.4 kg (206 lb)  Body mass index is 37.68 kg/m².  No intake or output data in the 24 hours ending 01/12/25 1253      Physical Exam  Constitutional:       Appearance: Normal appearance.   HENT:      Head: Normocephalic and atraumatic.      Nose: Nose normal.      Mouth/Throat:      Mouth: Mucous membranes are moist.   Eyes:      Extraocular Movements: Extraocular movements intact.      Pupils: Pupils are equal, round, and reactive to light.   Cardiovascular:      Rate and Rhythm: Normal rate and regular rhythm.      Heart sounds: No murmur heard.     No gallop.   Pulmonary:      Effort: Pulmonary effort is normal.      Breath sounds: Normal breath sounds. No wheezing or rales.   Abdominal:      General: Abdomen is flat. Bowel sounds are normal. There is no distension.      Palpations: Abdomen is soft.      Tenderness: There is no abdominal tenderness.   Musculoskeletal:         General: No swelling or tenderness. Normal range of motion.      Cervical back: Normal range of motion and neck supple.      Right lower leg: No edema.      Left lower leg: No edema.   Skin:     General: Skin is warm and dry.      Capillary Refill: Capillary refill takes less than 2  seconds.      Comments: Scarring present on the right leg      Neurological:      General: No focal deficit present.      Mental Status: She is alert. Mental status is at baseline.   Psychiatric:         Mood and Affect: Mood normal.             Significant Labs: All pertinent labs within the past 24 hours have been reviewed.    Significant Imaging: I have reviewed all pertinent imaging results/findings within the past 24 hours.    Assessment and Plan     * Vaso-occlusive pain due to sickle cell disease  44-year-old female with sickle cell beta thalassemia zero, multiple pulmonary emboli on chronic AC, asthma, and HTN who presented to [facility] ED 1/4/25 for diffuse myalgias and pain consistent with a sickle cell pain crisis as well as fever, nausea, vomiting, and diarrhea for the past day. She was recently admitted to [facility] 12/29/24 - 1/5/25 for a pain crisis.  CXR without focal consolidation. I am not concerned for acute chest at this time.    Plan  - Multimodal pain regimen: scheduled tylenol, robaxin, and oxycodone PRN   - Dilaudid 3mg IV q3h prn, will wean 1/13/25 to dilaudid 2 mg q4h   - mIVF  - Folic acid, hydroxyurea and other home SCD medications reordered  - repeat CXR 1/11/25 without signs of acute chest, atelectasis present will order incentive spirometry     Bacteremia due to Staphylococcus epidermidis  -blood cultures with staph epidermis and another CONS   -due to port placement ID consult placed  -stopped Rocephin and started on Vancomycin   -TTE negative for vegetations   -s/p Port removal with General Surgery 1/12/25  -repeat BC NGTD  -BC ordered 1/12/25  -if cultures remain negative x48 h possible for placement of new port. Will discuss with HO team about this   -planning for Vancomycin with ALYSE 1/18/25          Fever  Tmax 100.6 in the ED.    Plan  -see bacteremia plan       Diarrhea  Resolved     Plan  - C diff unable to be ordered       Chronic anticoagulation  See History of PE      History of  pulmonary embolism  Chronic and stable.    Plan  - Continue home Eliquis  - Consider CTA for worsening chest pain and hypoxia      Hypertension  Patient's blood pressure range in the last 24 hours was: BP  Min: 136/86  Max: 178/94.The patient's inpatient anti-hypertensive regimen is listed below:  Current Antihypertensives  amLODIPine tablet 10 mg, Daily, Oral    Plan  - BP is controlled, no changes needed to their regimen    Intermittent asthma  Chronic and stable. Not in respiratory distress.    Plan  - Continue home albuterol PRN      Chronic prescription opiate use  Uses  tylenol, oxycodone 15mg, and muscle relaxers. Pain has been refractory to these due to reported emesis.    Plan  - See Vaso-occlusive pain      Chronic gastritis  Chronic and stable.    Plan  - Continue home PPI and carafate      Chest wall pain  -pain on the left side of the chest and shoulder area  -worse with palpation   -EKG and troponin negative   -echo without significant abnormalities   -seems to be MSK related, if worsens or vital sign changes or hypoxia will consider CTPE         VTE Risk Mitigation (From admission, onward)           Ordered     IP VTE HIGH RISK PATIENT  Once         01/07/25 2326     Place sequential compression device  Until discontinued         01/07/25 2326                    Discharge Planning   ALYSE: 1/12/2025     Code Status: Full Code   Medical Readiness for Discharge Date:   Discharge Plan A: Home with family   Discharge Delays: None known at this time                    Deb Quiroz MD  Department of Hospital Medicine   [facility]

## 2025-01-12 NOTE — SUBJECTIVE & OBJECTIVE
Interval History: Aferbile, VSS. WBC 5.7. Staph capitis. Port removal with GSY today.     Susceptibility Staph capitis pending.       Objective:     Vital Signs (Most Recent):  Temp: 98.2 °F (36.8 °C) (01/12/25 0726)  Pulse: 75 (01/12/25 0726)  Resp: 18 (01/12/25 0726)  BP: (!) 145/85 (01/12/25 0726)  SpO2: 98 % (01/12/25 0726) Vital Signs (24h Range):  Temp:  [98 °F (36.7 °C)-98.8 °F (37.1 °C)] 98.2 °F (36.8 °C)  Pulse:  [75-98] 75  Resp:  [18-20] 18  SpO2:  [95 %-98 %] 98 %  BP: (118-178)/(85-99) 145/85     Weight: 93.4 kg (206 lb)  Body mass index is 37.68 kg/m².    Estimated Creatinine Clearance: 74.8 mL/min (based on SCr of 1 mg/dL).     Physical Exam  Constitutional:       Appearance: Normal appearance.   HENT:      Head: Normocephalic and atraumatic.      Nose: Nose normal.      Mouth/Throat:      Mouth: Mucous membranes are moist.   Eyes:      Extraocular Movements: Extraocular movements intact.      Pupils: Pupils are equal, round, and reactive to light.   Cardiovascular:      Rate and Rhythm: Normal rate and regular rhythm.      Heart sounds: No murmur heard.     No gallop.   Pulmonary:      Effort: Pulmonary effort is normal.      Breath sounds: Normal breath sounds. No wheezing or rales.   Abdominal:      General: Abdomen is flat. Bowel sounds are normal. There is no distension.      Palpations: Abdomen is soft.      Tenderness: There is no abdominal tenderness.   Musculoskeletal:         General: No swelling or tenderness. Normal range of motion.      Cervical back: Normal range of motion and neck supple.      Right lower leg: No edema.      Left lower leg: No edema.      Comments: Port site on R chest without erythema, induration, fluctuance, or tenderness.   Previous L port site scar well healed   Skin:     General: Skin is warm and dry.      Capillary Refill: Capillary refill takes less than 2 seconds.      Comments: Scarring present on the right leg    Neurological:      General: No focal deficit  present.      Mental Status: She is alert. Mental status is at baseline.   Psychiatric:         Mood and Affect: Mood normal.          Significant Labs: Blood Culture:   Recent Labs   Lab 10/21/24  0516 11/12/24  1802 11/12/24  1805 01/07/25  1934 01/09/25  0533   LABBLOO No growth after 5 days. No growth after 5 days. No growth after 5 days. Gram stain aer bottle: Gram positive cocci in clusters resembling Staph  Results called to and read back by: Rachel BLANCO 01/09/2025  04:21  Gram stain kimberly bottle: Gram positive cocci in clusters resembling Staph  Positive results previously called 01/09/2025  15:07  STAPHYLOCOCCUS CAPITIS  Susceptibility pending  *  Gram stain aer bottle: Gram positive cocci in clusters resembling Staph  Results called to and read back by:Belia Moses RN 01/08/2025  21:02  Gram stain kimberly bottle: Gram positive cocci in clusters resembling Staph  Positive results previously called 01/09/2025  15:06  COAGULASE-NEGATIVE STAPHYLOCOCCUS SPECIES  Organism is a probable contaminant  *  STAPHYLOCOCCUS CAPITIS  Susceptibility pending  * No Growth to date  No Growth to date  No Growth to date  No Growth to date  No Growth to date  No Growth to date     CBC:   Recent Labs   Lab 01/11/25  0440 01/12/25  0440   WBC 7.08 5.78   HGB 8.4* 8.2*   HCT 25.9* 25.6*    293     CMP:   Recent Labs   Lab 01/11/25  0440 01/12/25  0440    140   K 4.0 3.8    108   CO2 28 25   GLU 85 75   BUN 13 15   CREATININE 1.0 1.0   CALCIUM 8.9 8.8   PROT 7.2 6.9   ALBUMIN 3.4* 3.3*   BILITOT 0.2 0.2   ALKPHOS 76 74   AST 18 15   ALT 16 13   ANIONGAP 7* 7*       Significant Imaging: Pertinent imaging reviewed

## 2025-01-12 NOTE — SUBJECTIVE & OBJECTIVE
Interval History: Pt seen and examined this morning on thea KINGSLEY. Port removed without issues. She is having pain and itching. Care plan reviewed. Otherwise, doing well and with no further complaints at this time.        Objective:     Vital Signs (Most Recent):  Temp: 98.8 °F (37.1 °C) (01/12/25 1202)  Pulse: 87 (01/12/25 1202)  Resp: 18 (01/12/25 1226)  BP: 136/86 (01/12/25 1202)  SpO2: 98 % (01/12/25 1202) Vital Signs (24h Range):  Temp:  [98 °F (36.7 °C)-98.8 °F (37.1 °C)] 98.8 °F (37.1 °C)  Pulse:  [75-98] 87  Resp:  [18-20] 18  SpO2:  [97 %-98 %] 98 %  BP: (136-178)/(85-99) 136/86     Weight: 93.4 kg (206 lb)  Body mass index is 37.68 kg/m².  No intake or output data in the 24 hours ending 01/12/25 1253      Physical Exam  Constitutional:       Appearance: Normal appearance.   HENT:      Head: Normocephalic and atraumatic.      Nose: Nose normal.      Mouth/Throat:      Mouth: Mucous membranes are moist.   Eyes:      Extraocular Movements: Extraocular movements intact.      Pupils: Pupils are equal, round, and reactive to light.   Cardiovascular:      Rate and Rhythm: Normal rate and regular rhythm.      Heart sounds: No murmur heard.     No gallop.   Pulmonary:      Effort: Pulmonary effort is normal.      Breath sounds: Normal breath sounds. No wheezing or rales.   Abdominal:      General: Abdomen is flat. Bowel sounds are normal. There is no distension.      Palpations: Abdomen is soft.      Tenderness: There is no abdominal tenderness.   Musculoskeletal:         General: No swelling or tenderness. Normal range of motion.      Cervical back: Normal range of motion and neck supple.      Right lower leg: No edema.      Left lower leg: No edema.   Skin:     General: Skin is warm and dry.      Capillary Refill: Capillary refill takes less than 2 seconds.      Comments: Scarring present on the right leg      Neurological:      General: No focal deficit present.      Mental Status: She is alert. Mental status  is at baseline.   Psychiatric:         Mood and Affect: Mood normal.             Significant Labs: All pertinent labs within the past 24 hours have been reviewed.    Significant Imaging: I have reviewed all pertinent imaging results/findings within the past 24 hours.

## 2025-01-13 LAB
ALBUMIN SERPL BCP-MCNC: 3.3 G/DL (ref 3.5–5.2)
ALP SERPL-CCNC: 72 U/L (ref 40–150)
ALT SERPL W/O P-5'-P-CCNC: 12 U/L (ref 10–44)
ANION GAP SERPL CALC-SCNC: 8 MMOL/L (ref 8–16)
AST SERPL-CCNC: 15 U/L (ref 10–40)
BACTERIA BLD CULT: ABNORMAL
BASOPHILS # BLD AUTO: 0.06 K/UL (ref 0–0.2)
BASOPHILS NFR BLD: 1 % (ref 0–1.9)
BILIRUB SERPL-MCNC: 0.3 MG/DL (ref 0.1–1)
BUN SERPL-MCNC: 13 MG/DL (ref 6–20)
CALCIUM SERPL-MCNC: 8.9 MG/DL (ref 8.7–10.5)
CHLORIDE SERPL-SCNC: 106 MMOL/L (ref 95–110)
CO2 SERPL-SCNC: 26 MMOL/L (ref 23–29)
CREAT SERPL-MCNC: 1 MG/DL (ref 0.5–1.4)
DIFFERENTIAL METHOD BLD: ABNORMAL
EOSINOPHIL # BLD AUTO: 0.4 K/UL (ref 0–0.5)
EOSINOPHIL NFR BLD: 7.1 % (ref 0–8)
ERYTHROCYTE [DISTWIDTH] IN BLOOD BY AUTOMATED COUNT: 22.3 % (ref 11.5–14.5)
EST. GFR  (NO RACE VARIABLE): >60 ML/MIN/1.73 M^2
GLUCOSE SERPL-MCNC: 87 MG/DL (ref 70–110)
HCT VFR BLD AUTO: 24.6 % (ref 37–48.5)
HGB BLD-MCNC: 8.2 G/DL (ref 12–16)
IMM GRANULOCYTES # BLD AUTO: 0 K/UL (ref 0–0.04)
IMM GRANULOCYTES NFR BLD AUTO: 0 % (ref 0–0.5)
LYMPHOCYTES # BLD AUTO: 3.8 K/UL (ref 1–4.8)
LYMPHOCYTES NFR BLD: 61.4 % (ref 18–48)
MAGNESIUM SERPL-MCNC: 1.8 MG/DL (ref 1.6–2.6)
MCH RBC QN AUTO: 21.7 PG (ref 27–31)
MCHC RBC AUTO-ENTMCNC: 33.3 G/DL (ref 32–36)
MCV RBC AUTO: 65 FL (ref 82–98)
MONOCYTES # BLD AUTO: 0.7 K/UL (ref 0.3–1)
MONOCYTES NFR BLD: 10.6 % (ref 4–15)
NEUTROPHILS # BLD AUTO: 1.2 K/UL (ref 1.8–7.7)
NEUTROPHILS NFR BLD: 19.9 % (ref 38–73)
NRBC BLD-RTO: 1 /100 WBC
PHOSPHATE SERPL-MCNC: 2.8 MG/DL (ref 2.7–4.5)
PLATELET # BLD AUTO: 222 K/UL (ref 150–450)
PMV BLD AUTO: 9.3 FL (ref 9.2–12.9)
POTASSIUM SERPL-SCNC: 3.5 MMOL/L (ref 3.5–5.1)
PROT SERPL-MCNC: 7.1 G/DL (ref 6–8.4)
RBC # BLD AUTO: 3.78 M/UL (ref 4–5.4)
SODIUM SERPL-SCNC: 140 MMOL/L (ref 136–145)
WBC # BLD AUTO: 6.22 K/UL (ref 3.9–12.7)

## 2025-01-13 PROCEDURE — 11000001 HC ACUTE MED/SURG PRIVATE ROOM

## 2025-01-13 PROCEDURE — 36415 COLL VENOUS BLD VENIPUNCTURE: CPT

## 2025-01-13 PROCEDURE — 63600175 PHARM REV CODE 636 W HCPCS: Performed by: STUDENT IN AN ORGANIZED HEALTH CARE EDUCATION/TRAINING PROGRAM

## 2025-01-13 PROCEDURE — 80053 COMPREHEN METABOLIC PANEL: CPT

## 2025-01-13 PROCEDURE — 25000003 PHARM REV CODE 250

## 2025-01-13 PROCEDURE — 63600175 PHARM REV CODE 636 W HCPCS: Performed by: INTERNAL MEDICINE

## 2025-01-13 PROCEDURE — 85025 COMPLETE CBC W/AUTO DIFF WBC: CPT

## 2025-01-13 PROCEDURE — 84100 ASSAY OF PHOSPHORUS: CPT

## 2025-01-13 PROCEDURE — 25000003 PHARM REV CODE 250: Performed by: STUDENT IN AN ORGANIZED HEALTH CARE EDUCATION/TRAINING PROGRAM

## 2025-01-13 PROCEDURE — 83735 ASSAY OF MAGNESIUM: CPT

## 2025-01-13 PROCEDURE — 25000003 PHARM REV CODE 250: Performed by: EMERGENCY MEDICINE

## 2025-01-13 RX ORDER — HYDROMORPHONE HYDROCHLORIDE 1 MG/ML
2 INJECTION, SOLUTION INTRAMUSCULAR; INTRAVENOUS; SUBCUTANEOUS
Status: DISCONTINUED | OUTPATIENT
Start: 2025-01-13 | End: 2025-01-13

## 2025-01-13 RX ORDER — HYDROMORPHONE HYDROCHLORIDE 1 MG/ML
3 INJECTION, SOLUTION INTRAMUSCULAR; INTRAVENOUS; SUBCUTANEOUS
Status: DISCONTINUED | OUTPATIENT
Start: 2025-01-13 | End: 2025-01-14

## 2025-01-13 RX ADMIN — SUCRALFATE 1 G: 1 TABLET ORAL at 08:01

## 2025-01-13 RX ADMIN — APIXABAN 5 MG: 5 TABLET, FILM COATED ORAL at 08:01

## 2025-01-13 RX ADMIN — ACETAMINOPHEN 975 MG: 325 TABLET ORAL at 08:01

## 2025-01-13 RX ADMIN — SUCRALFATE 1 G: 1 TABLET ORAL at 04:01

## 2025-01-13 RX ADMIN — HYDROMORPHONE HYDROCHLORIDE 3 MG: 1 INJECTION, SOLUTION INTRAMUSCULAR; INTRAVENOUS; SUBCUTANEOUS at 09:01

## 2025-01-13 RX ADMIN — OXYCODONE 15 MG: 5 TABLET ORAL at 12:01

## 2025-01-13 RX ADMIN — HYDROMORPHONE HYDROCHLORIDE 3 MG: 1 INJECTION, SOLUTION INTRAMUSCULAR; INTRAVENOUS; SUBCUTANEOUS at 10:01

## 2025-01-13 RX ADMIN — GABAPENTIN 600 MG: 300 CAPSULE ORAL at 08:01

## 2025-01-13 RX ADMIN — VANCOMYCIN HYDROCHLORIDE 1250 MG: 1.25 INJECTION, POWDER, LYOPHILIZED, FOR SOLUTION INTRAVENOUS at 03:01

## 2025-01-13 RX ADMIN — OXYCODONE 15 MG: 5 TABLET ORAL at 08:01

## 2025-01-13 RX ADMIN — DIPHENHYDRAMINE HYDROCHLORIDE 25 MG: 50 INJECTION, SOLUTION INTRAMUSCULAR; INTRAVENOUS at 05:01

## 2025-01-13 RX ADMIN — METHOCARBAMOL 1000 MG: 500 TABLET ORAL at 03:01

## 2025-01-13 RX ADMIN — HYDROMORPHONE HYDROCHLORIDE 3 MG: 1 INJECTION, SOLUTION INTRAMUSCULAR; INTRAVENOUS; SUBCUTANEOUS at 06:01

## 2025-01-13 RX ADMIN — PANTOPRAZOLE SODIUM 40 MG: 40 TABLET, DELAYED RELEASE ORAL at 08:01

## 2025-01-13 RX ADMIN — SUCRALFATE 1 G: 1 TABLET ORAL at 06:01

## 2025-01-13 RX ADMIN — SENNOSIDES AND DOCUSATE SODIUM 1 TABLET: 50; 8.6 TABLET ORAL at 08:01

## 2025-01-13 RX ADMIN — HYDROMORPHONE HYDROCHLORIDE 3 MG: 1 INJECTION, SOLUTION INTRAMUSCULAR; INTRAVENOUS; SUBCUTANEOUS at 05:01

## 2025-01-13 RX ADMIN — OXYCODONE 15 MG: 5 TABLET ORAL at 04:01

## 2025-01-13 RX ADMIN — DIPHENHYDRAMINE HYDROCHLORIDE 25 MG: 50 INJECTION, SOLUTION INTRAMUSCULAR; INTRAVENOUS at 12:01

## 2025-01-13 RX ADMIN — ACETAMINOPHEN 975 MG: 325 TABLET ORAL at 03:01

## 2025-01-13 RX ADMIN — HYDROMORPHONE HYDROCHLORIDE 3 MG: 1 INJECTION, SOLUTION INTRAMUSCULAR; INTRAVENOUS; SUBCUTANEOUS at 12:01

## 2025-01-13 RX ADMIN — DIPHENHYDRAMINE HYDROCHLORIDE 25 MG: 50 INJECTION, SOLUTION INTRAMUSCULAR; INTRAVENOUS at 01:01

## 2025-01-13 RX ADMIN — HYDROXYUREA 1000 MG: 500 CAPSULE ORAL at 08:01

## 2025-01-13 RX ADMIN — VANCOMYCIN HYDROCHLORIDE 1250 MG: 1.25 INJECTION, POWDER, LYOPHILIZED, FOR SOLUTION INTRAVENOUS at 06:01

## 2025-01-13 RX ADMIN — HYDROMORPHONE HYDROCHLORIDE 2 MG: 1 INJECTION, SOLUTION INTRAMUSCULAR; INTRAVENOUS; SUBCUTANEOUS at 01:01

## 2025-01-13 RX ADMIN — FOLIC ACID 1 MG: 1 TABLET ORAL at 08:01

## 2025-01-13 RX ADMIN — GABAPENTIN 600 MG: 300 CAPSULE ORAL at 03:01

## 2025-01-13 RX ADMIN — MUPIROCIN: 20 OINTMENT TOPICAL at 08:01

## 2025-01-13 RX ADMIN — POLYETHYLENE GLYCOL 3350 17 G: 17 POWDER, FOR SOLUTION ORAL at 08:01

## 2025-01-13 RX ADMIN — METHOCARBAMOL 1000 MG: 500 TABLET ORAL at 08:01

## 2025-01-13 RX ADMIN — Medication 6 MG: at 12:01

## 2025-01-13 RX ADMIN — SUCRALFATE 1 G: 1 TABLET ORAL at 11:01

## 2025-01-13 RX ADMIN — OXYCODONE 15 MG: 5 TABLET ORAL at 03:01

## 2025-01-13 RX ADMIN — DIPHENHYDRAMINE HYDROCHLORIDE 25 MG: 50 INJECTION, SOLUTION INTRAMUSCULAR; INTRAVENOUS at 09:01

## 2025-01-13 RX ADMIN — DIPHENHYDRAMINE HYDROCHLORIDE 25 MG: 50 INJECTION, SOLUTION INTRAMUSCULAR; INTRAVENOUS at 06:01

## 2025-01-13 RX ADMIN — DIPHENHYDRAMINE HYDROCHLORIDE 25 MG: 50 INJECTION, SOLUTION INTRAMUSCULAR; INTRAVENOUS at 10:01

## 2025-01-13 RX ADMIN — AMLODIPINE BESYLATE 10 MG: 10 TABLET ORAL at 08:01

## 2025-01-13 NOTE — SUBJECTIVE & OBJECTIVE
Interval History: Pt seen and examined this AM. ALEK. Having some pain where port was taken out       Objective:     Vital Signs (Most Recent):  Temp: 98.6 °F (37 °C) (01/13/25 1101)  Pulse: 92 (01/13/25 1101)  Resp: 20 (01/13/25 1214)  BP: 136/81 (01/13/25 1101)  SpO2: 97 % (01/13/25 1101) Vital Signs (24h Range):  Temp:  [98.3 °F (36.8 °C)-98.6 °F (37 °C)] 98.6 °F (37 °C)  Pulse:  [] 92  Resp:  [14-20] 20  SpO2:  [97 %-98 %] 97 %  BP: (124-147)/(81-87) 136/81     Weight: 93.4 kg (206 lb)  Body mass index is 37.68 kg/m².  No intake or output data in the 24 hours ending 01/13/25 1248      Physical Exam  Constitutional:       Appearance: Normal appearance.   HENT:      Head: Normocephalic and atraumatic.      Nose: Nose normal.      Mouth/Throat:      Mouth: Mucous membranes are moist.   Eyes:      Extraocular Movements: Extraocular movements intact.      Pupils: Pupils are equal, round, and reactive to light.   Cardiovascular:      Rate and Rhythm: Normal rate and regular rhythm.      Heart sounds: No murmur heard.     No gallop.   Pulmonary:      Effort: Pulmonary effort is normal.      Breath sounds: Normal breath sounds. No wheezing or rales.   Abdominal:      General: Abdomen is flat. Bowel sounds are normal. There is no distension.      Palpations: Abdomen is soft.      Tenderness: There is no abdominal tenderness.   Musculoskeletal:         General: No swelling or tenderness. Normal range of motion.      Cervical back: Normal range of motion and neck supple.      Right lower leg: No edema.      Left lower leg: No edema.   Skin:     General: Skin is warm and dry.      Capillary Refill: Capillary refill takes less than 2 seconds.      Comments: Scarring present on the right leg      Neurological:      General: No focal deficit present.      Mental Status: She is alert. Mental status is at baseline.   Psychiatric:         Mood and Affect: Mood normal.             Significant Labs: All pertinent labs within  the past 24 hours have been reviewed.    Significant Imaging: I have reviewed all pertinent imaging results/findings within the past 24 hours.

## 2025-01-13 NOTE — ASSESSMENT & PLAN NOTE
44-year-old female with sickle cell beta thalassemia zero, multiple pulmonary emboli on chronic AC, asthma, and HTN who presented to [facility] ED 1/4/25 for diffuse myalgias and pain consistent with a sickle cell pain crisis as well as fever, nausea, vomiting, and diarrhea for the past day. She was recently admitted to [facility] [place] [facility] 12/29/24 - 1/5/25 for a pain crisis.  CXR without focal consolidation. I am not concerned for acute chest at this time.    Plan  - Multimodal pain regimen: scheduled tylenol, robaxin, and oxycodone PRN   - Dilaudid 3mg IV q3h prn, will wean 1/13/25 to dilaudid 2 mg q3h   - mIVF  - Folic acid, hydroxyurea and other home SCD medications reordered  - repeat CXR 1/11/25 without signs of acute chest, atelectasis present will order incentive spirometry

## 2025-01-13 NOTE — PROGRESS NOTES
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 ED 1/4/25 for diffuse myalgias and pain consistent with a sickle cell pain crisis as well as fever, nausea, vomiting, and diarrhea for the past day. She was recently admitted to [facility] 12/29/24 - 1/5/25 for pain c/w sickle cell pain crises which was refractory to her home regimen of tylenol, oxycodone 15mg, and muscle relaxers. She reported that she moved to the area about 6 weeks ago after living in [place]. She was receiving chronic exchange transfusions there but is not currently receiving them as part of her therapy here. She is seeing a hematologist. She also reports that she previously was on MS contin, pen VK, and hydroxurea as part of her home regimen but has not been put back on those since she has moved here. Of note, during that recent admission, the provider was informed by nursing that the patient asked if she could be given a little more medicine than what was prescribed stating "no one has to know". She also asked if nursing needed some help to throw away an empty flush syringe and an empty med syringe. At that time, she was prescribed Dilaudid 3mg IV q3h PRN and IVF with improvement in her pain.    In the ED, /99 HR 74 RR 20 sats adequate on ambient air. Tmax 100.9. Labs notable for Hgb 9.8 and K 2.9. LA wnl. COVID, Flu, and RSV negative. Blood cultures were obtained. UA was ordered. CXR showed . Received Dilaudid 3mg IV in total, Tylenol 1g, Benadryl 25mg PO, Zofran 4mg IV, " and potassium 25mEq PO.    The patient was admitted to Hospital Medicine for further workup and management.    Overview/Hospital Course:  No notes on file    Interval History: Pt seen and examined this AM. NAEO. Having some pain where port was taken out       Objective:     Vital Signs (Most Recent):  Temp: 98.6 °F (37 °C) (01/13/25 1101)  Pulse: 92 (01/13/25 1101)  Resp: 20 (01/13/25 1214)  BP: 136/81 (01/13/25 1101)  SpO2: 97 % (01/13/25 1101) Vital Signs (24h Range):  Temp:  [98.3 °F (36.8 °C)-98.6 °F (37 °C)] 98.6 °F (37 °C)  Pulse:  [] 92  Resp:  [14-20] 20  SpO2:  [97 %-98 %] 97 %  BP: (124-147)/(81-87) 136/81     Weight: 93.4 kg (206 lb)  Body mass index is 37.68 kg/m².  No intake or output data in the 24 hours ending 01/13/25 1248      Physical Exam  Constitutional:       Appearance: Normal appearance.   HENT:      Head: Normocephalic and atraumatic.      Nose: Nose normal.      Mouth/Throat:      Mouth: Mucous membranes are moist.   Eyes:      Extraocular Movements: Extraocular movements intact.      Pupils: Pupils are equal, round, and reactive to light.   Cardiovascular:      Rate and Rhythm: Normal rate and regular rhythm.      Heart sounds: No murmur heard.     No gallop.   Pulmonary:      Effort: Pulmonary effort is normal.      Breath sounds: Normal breath sounds. No wheezing or rales.   Abdominal:      General: Abdomen is flat. Bowel sounds are normal. There is no distension.      Palpations: Abdomen is soft.      Tenderness: There is no abdominal tenderness.   Musculoskeletal:         General: No swelling or tenderness. Normal range of motion.      Cervical back: Normal range of motion and neck supple.      Right lower leg: No edema.      Left lower leg: No edema.   Skin:     General: Skin is warm and dry.      Capillary Refill: Capillary refill takes less than 2 seconds.      Comments: Scarring present on the right leg      Neurological:      General: No focal deficit present.      Mental  Status: She is alert. Mental status is at baseline.   Psychiatric:         Mood and Affect: Mood normal.             Significant Labs: All pertinent labs within the past 24 hours have been reviewed.    Significant Imaging: I have reviewed all pertinent imaging results/findings within the past 24 hours.    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 She was recently admitted to [facility] 12/29/24 - 1/5/25 for a pain crisis.  CXR without focal consolidation. I am not concerned for acute chest at this time.    Plan  - Multimodal pain regimen: scheduled tylenol, robaxin, and oxycodone PRN   - Dilaudid 3mg IV q3h prn, will wean 1/13/25 to dilaudid 2 mg q3h   - mIVF  - Folic acid, hydroxyurea and other home SCD medications reordered  - repeat CXR 1/11/25 without signs of acute chest, atelectasis present will order incentive spirometry     Bacteremia due to Staphylococcus epidermidis  -blood cultures with staph epidermis and another CONS   -due to port placement ID consult placed  -stopped Rocephin and started on Vancomycin   -TTE negative for vegetations   -s/p Port removal with General Surgery 1/12/25  -repeat BC NGTD  -BC ordered 1/12/25  -if cultures remain negative x48 h possible for placement of new port. Will discuss with HO team about this   -planning for Vancomycin with ALYSE 1/18/25          Fever  Tmax 100.6 in the ED.    Plan  -see bacteremia plan       Diarrhea  Resolved     Plan  - C diff unable to be ordered       Chronic anticoagulation  See History of PE      History of pulmonary embolism  Chronic and stable.    Plan  - Continue home Eliquis  - Consider CTA for pleuritic pain and  hypoxia      Hypertension  Patient's blood pressure range in the last 24 hours was: BP  Min: 136/86  Max: 178/94.The patient's inpatient anti-hypertensive regimen is listed below:  Current Antihypertensives  amLODIPine tablet 10 mg, Daily, Oral    Plan  - BP is controlled, no changes needed to their regimen    Intermittent asthma  Chronic and stable. Not in respiratory distress.    Plan  - Continue home albuterol PRN      Chronic prescription opiate use  Uses  tylenol, oxycodone 15mg, and muscle relaxers. Pain has been refractory to these due to reported emesis.    Plan  - See Vaso-occlusive pain      Chronic gastritis  Chronic and stable.    Plan  - Continue home PPI and carafate      Chest wall pain  -pain on the left side of the chest and shoulder area  -worse with palpation   -EKG and troponin negative   -echo without significant abnormalities   -seems to be MSK related, if worsens or vital sign changes or hypoxia will consider CTPE         VTE Risk Mitigation (From admission, onward)           Ordered     apixaban tablet 5 mg  2 times daily         01/12/25 1258     IP VTE HIGH RISK PATIENT  Once         01/07/25 2326     Place sequential compression device  Until discontinued         01/07/25 2326                    Discharge Planning   ALYSE: 1/15/2025     Code Status: Full Code   Medical Readiness for Discharge Date:   Discharge Plan A: Home with family   Discharge Delays: None known at this time                    Deb Quiroz MD  Department of Hospital Medicine   [facility]

## 2025-01-14 LAB
ALBUMIN SERPL BCP-MCNC: 3.2 G/DL (ref 3.5–5.2)
ALP SERPL-CCNC: 74 U/L (ref 40–150)
ALT SERPL W/O P-5'-P-CCNC: 13 U/L (ref 10–44)
ANION GAP SERPL CALC-SCNC: 8 MMOL/L (ref 8–16)
ANISOCYTOSIS BLD QL SMEAR: ABNORMAL
AST SERPL-CCNC: 16 U/L (ref 10–40)
BACTERIA BLD CULT: NORMAL
BACTERIA BLD CULT: NORMAL
BASOPHILS # BLD AUTO: 0.05 K/UL (ref 0–0.2)
BASOPHILS NFR BLD: 0.8 % (ref 0–1.9)
BILIRUB SERPL-MCNC: 0.2 MG/DL (ref 0.1–1)
BUN SERPL-MCNC: 13 MG/DL (ref 6–20)
CALCIUM SERPL-MCNC: 8.7 MG/DL (ref 8.7–10.5)
CHLORIDE SERPL-SCNC: 108 MMOL/L (ref 95–110)
CO2 SERPL-SCNC: 24 MMOL/L (ref 23–29)
CREAT SERPL-MCNC: 0.9 MG/DL (ref 0.5–1.4)
DACRYOCYTES BLD QL SMEAR: ABNORMAL
DIFFERENTIAL METHOD BLD: ABNORMAL
EOSINOPHIL # BLD AUTO: 0.4 K/UL (ref 0–0.5)
EOSINOPHIL NFR BLD: 7.1 % (ref 0–8)
ERYTHROCYTE [DISTWIDTH] IN BLOOD BY AUTOMATED COUNT: 22.1 % (ref 11.5–14.5)
EST. GFR  (NO RACE VARIABLE): >60 ML/MIN/1.73 M^2
GLUCOSE SERPL-MCNC: 147 MG/DL (ref 70–110)
HCT VFR BLD AUTO: 23.1 % (ref 37–48.5)
HGB BLD-MCNC: 7.6 G/DL (ref 12–16)
HYPOCHROMIA BLD QL SMEAR: ABNORMAL
IMM GRANULOCYTES # BLD AUTO: 0 K/UL (ref 0–0.04)
IMM GRANULOCYTES NFR BLD AUTO: 0 % (ref 0–0.5)
LYMPHOCYTES # BLD AUTO: 3.8 K/UL (ref 1–4.8)
LYMPHOCYTES NFR BLD: 64.7 % (ref 18–48)
MAGNESIUM SERPL-MCNC: 1.7 MG/DL (ref 1.6–2.6)
MCH RBC QN AUTO: 21.8 PG (ref 27–31)
MCHC RBC AUTO-ENTMCNC: 32.9 G/DL (ref 32–36)
MCV RBC AUTO: 66 FL (ref 82–98)
MONOCYTES # BLD AUTO: 0.7 K/UL (ref 0.3–1)
MONOCYTES NFR BLD: 12.3 % (ref 4–15)
NEUTROPHILS # BLD AUTO: 0.9 K/UL (ref 1.8–7.7)
NEUTROPHILS NFR BLD: 15.1 % (ref 38–73)
NRBC BLD-RTO: 1 /100 WBC
OVALOCYTES BLD QL SMEAR: ABNORMAL
PHOSPHATE SERPL-MCNC: 2.8 MG/DL (ref 2.7–4.5)
PLATELET # BLD AUTO: 183 K/UL (ref 150–450)
PLATELET BLD QL SMEAR: ABNORMAL
PMV BLD AUTO: 9.9 FL (ref 9.2–12.9)
POIKILOCYTOSIS BLD QL SMEAR: ABNORMAL
POLYCHROMASIA BLD QL SMEAR: ABNORMAL
POTASSIUM SERPL-SCNC: 3.2 MMOL/L (ref 3.5–5.1)
PROT SERPL-MCNC: 6.7 G/DL (ref 6–8.4)
RBC # BLD AUTO: 3.48 M/UL (ref 4–5.4)
SCHISTOCYTES BLD QL SMEAR: ABNORMAL
SICKLE CELLS BLD QL SMEAR: ABNORMAL
SODIUM SERPL-SCNC: 140 MMOL/L (ref 136–145)
TARGETS BLD QL SMEAR: ABNORMAL
VANCOMYCIN TROUGH SERPL-MCNC: 17 UG/ML (ref 10–22)
WBC # BLD AUTO: 5.92 K/UL (ref 3.9–12.7)

## 2025-01-14 PROCEDURE — 63600175 PHARM REV CODE 636 W HCPCS: Performed by: STUDENT IN AN ORGANIZED HEALTH CARE EDUCATION/TRAINING PROGRAM

## 2025-01-14 PROCEDURE — 85025 COMPLETE CBC W/AUTO DIFF WBC: CPT

## 2025-01-14 PROCEDURE — 80202 ASSAY OF VANCOMYCIN: CPT | Performed by: STUDENT IN AN ORGANIZED HEALTH CARE EDUCATION/TRAINING PROGRAM

## 2025-01-14 PROCEDURE — 83735 ASSAY OF MAGNESIUM: CPT

## 2025-01-14 PROCEDURE — 80053 COMPREHEN METABOLIC PANEL: CPT

## 2025-01-14 PROCEDURE — 36415 COLL VENOUS BLD VENIPUNCTURE: CPT | Performed by: STUDENT IN AN ORGANIZED HEALTH CARE EDUCATION/TRAINING PROGRAM

## 2025-01-14 PROCEDURE — 63600175 PHARM REV CODE 636 W HCPCS: Performed by: INTERNAL MEDICINE

## 2025-01-14 PROCEDURE — 25000003 PHARM REV CODE 250

## 2025-01-14 PROCEDURE — 25000003 PHARM REV CODE 250: Performed by: STUDENT IN AN ORGANIZED HEALTH CARE EDUCATION/TRAINING PROGRAM

## 2025-01-14 PROCEDURE — 11000001 HC ACUTE MED/SURG PRIVATE ROOM

## 2025-01-14 PROCEDURE — 84100 ASSAY OF PHOSPHORUS: CPT

## 2025-01-14 RX ORDER — HYDROMORPHONE HYDROCHLORIDE 1 MG/ML
3 INJECTION, SOLUTION INTRAMUSCULAR; INTRAVENOUS; SUBCUTANEOUS EVERY 4 HOURS PRN
Status: DISCONTINUED | OUTPATIENT
Start: 2025-01-14 | End: 2025-01-14

## 2025-01-14 RX ORDER — HYDROMORPHONE HYDROCHLORIDE 1 MG/ML
3 INJECTION, SOLUTION INTRAMUSCULAR; INTRAVENOUS; SUBCUTANEOUS EVERY 4 HOURS PRN
Status: DISCONTINUED | OUTPATIENT
Start: 2025-01-14 | End: 2025-01-15

## 2025-01-14 RX ORDER — POTASSIUM CHLORIDE 20 MEQ/1
40 TABLET, EXTENDED RELEASE ORAL ONCE
Status: COMPLETED | OUTPATIENT
Start: 2025-01-14 | End: 2025-01-14

## 2025-01-14 RX ADMIN — HYDROMORPHONE HYDROCHLORIDE 3 MG: 1 INJECTION, SOLUTION INTRAMUSCULAR; INTRAVENOUS; SUBCUTANEOUS at 02:01

## 2025-01-14 RX ADMIN — SENNOSIDES AND DOCUSATE SODIUM 1 TABLET: 50; 8.6 TABLET ORAL at 08:01

## 2025-01-14 RX ADMIN — OXYCODONE HYDROCHLORIDE 15 MG: 10 TABLET ORAL at 10:01

## 2025-01-14 RX ADMIN — APIXABAN 5 MG: 5 TABLET, FILM COATED ORAL at 08:01

## 2025-01-14 RX ADMIN — GABAPENTIN 600 MG: 300 CAPSULE ORAL at 02:01

## 2025-01-14 RX ADMIN — MUPIROCIN: 20 OINTMENT TOPICAL at 08:01

## 2025-01-14 RX ADMIN — POTASSIUM CHLORIDE 40 MEQ: 1500 TABLET, EXTENDED RELEASE ORAL at 11:01

## 2025-01-14 RX ADMIN — PANTOPRAZOLE SODIUM 40 MG: 40 TABLET, DELAYED RELEASE ORAL at 08:01

## 2025-01-14 RX ADMIN — DIPHENHYDRAMINE HYDROCHLORIDE 25 MG: 50 INJECTION, SOLUTION INTRAMUSCULAR; INTRAVENOUS at 08:01

## 2025-01-14 RX ADMIN — SUCRALFATE 1 G: 1 TABLET ORAL at 04:01

## 2025-01-14 RX ADMIN — OXYCODONE HYDROCHLORIDE 15 MG: 10 TABLET ORAL at 11:01

## 2025-01-14 RX ADMIN — DIPHENHYDRAMINE HYDROCHLORIDE 25 MG: 50 INJECTION, SOLUTION INTRAMUSCULAR; INTRAVENOUS at 11:01

## 2025-01-14 RX ADMIN — SUCRALFATE 1 G: 1 TABLET ORAL at 08:01

## 2025-01-14 RX ADMIN — AMLODIPINE BESYLATE 10 MG: 10 TABLET ORAL at 08:01

## 2025-01-14 RX ADMIN — METHOCARBAMOL 1000 MG: 500 TABLET ORAL at 08:01

## 2025-01-14 RX ADMIN — DIPHENHYDRAMINE HYDROCHLORIDE 25 MG: 50 INJECTION, SOLUTION INTRAMUSCULAR; INTRAVENOUS at 06:01

## 2025-01-14 RX ADMIN — HYDROMORPHONE HYDROCHLORIDE 3 MG: 1 INJECTION, SOLUTION INTRAMUSCULAR; INTRAVENOUS; SUBCUTANEOUS at 06:01

## 2025-01-14 RX ADMIN — ACETAMINOPHEN 975 MG: 325 TABLET ORAL at 08:01

## 2025-01-14 RX ADMIN — METHOCARBAMOL 1000 MG: 500 TABLET ORAL at 02:01

## 2025-01-14 RX ADMIN — OXYCODONE HYDROCHLORIDE 15 MG: 10 TABLET ORAL at 07:01

## 2025-01-14 RX ADMIN — OXYCODONE 15 MG: 5 TABLET ORAL at 03:01

## 2025-01-14 RX ADMIN — VANCOMYCIN HYDROCHLORIDE 1250 MG: 1.25 INJECTION, POWDER, LYOPHILIZED, FOR SOLUTION INTRAVENOUS at 05:01

## 2025-01-14 RX ADMIN — POLYETHYLENE GLYCOL 3350 17 G: 17 POWDER, FOR SOLUTION ORAL at 08:01

## 2025-01-14 RX ADMIN — DIPHENHYDRAMINE HYDROCHLORIDE 25 MG: 50 INJECTION, SOLUTION INTRAMUSCULAR; INTRAVENOUS at 02:01

## 2025-01-14 RX ADMIN — SUCRALFATE 1 G: 1 TABLET ORAL at 06:01

## 2025-01-14 RX ADMIN — FOLIC ACID 1 MG: 1 TABLET ORAL at 08:01

## 2025-01-14 RX ADMIN — SUCRALFATE 1 G: 1 TABLET ORAL at 10:01

## 2025-01-14 RX ADMIN — HYDROMORPHONE HYDROCHLORIDE 3 MG: 1 INJECTION, SOLUTION INTRAMUSCULAR; INTRAVENOUS; SUBCUTANEOUS at 11:01

## 2025-01-14 RX ADMIN — HYDROMORPHONE HYDROCHLORIDE 3 MG: 1 INJECTION, SOLUTION INTRAMUSCULAR; INTRAVENOUS; SUBCUTANEOUS at 08:01

## 2025-01-14 RX ADMIN — DIPHENHYDRAMINE HYDROCHLORIDE 25 MG: 50 INJECTION, SOLUTION INTRAMUSCULAR; INTRAVENOUS at 04:01

## 2025-01-14 RX ADMIN — GABAPENTIN 600 MG: 300 CAPSULE ORAL at 08:01

## 2025-01-14 RX ADMIN — HYDROMORPHONE HYDROCHLORIDE 3 MG: 1 INJECTION, SOLUTION INTRAMUSCULAR; INTRAVENOUS; SUBCUTANEOUS at 04:01

## 2025-01-14 RX ADMIN — HYDROXYUREA 1000 MG: 500 CAPSULE ORAL at 08:01

## 2025-01-14 RX ADMIN — VANCOMYCIN HYDROCHLORIDE 1250 MG: 1.25 INJECTION, POWDER, LYOPHILIZED, FOR SOLUTION INTRAVENOUS at 07:01

## 2025-01-14 RX ADMIN — OXYCODONE HYDROCHLORIDE 15 MG: 10 TABLET ORAL at 03:01

## 2025-01-14 NOTE — PLAN OF CARE
Problem: Sickle Cell Disease  Goal: Optimal Coping with Sickle Cell Disease  Outcome: Progressing  Goal: Effective Tissue Perfusion  Outcome: Progressing     Problem: Fall Injury Risk  Goal: Absence of Fall and Fall-Related Injury  Outcome: Progressing

## 2025-01-14 NOTE — ASSESSMENT & PLAN NOTE
44-year-old female with sickle cell beta thalassemia zero, multiple pulmonary emboli on chronic AC, asthma, and HTN who presented to [facility] ED 1/4/25 for diffuse myalgias and pain consistent with a sickle cell pain crisis as well as fever, nausea, vomiting, and diarrhea for the past day. She was recently admitted to [facility] 12/29/24 - 1/5/25 for a pain crisis.  CXR without focal consolidation. I am not concerned for acute chest at this time.    Plan  - Multimodal pain regimen: scheduled tylenol, robaxin, and oxycodone PRN   - Dilaudid 3mg IV q3h prn,  attempted to wean to 2 mg without success. Wean to to 2 mg q4h   - mIVF  - Folic acid, hydroxyurea and other home SCD medications reordered  - repeat CXR 1/11/25 without signs of acute chest, atelectasis present will order incentive spirometry

## 2025-01-14 NOTE — ASSESSMENT & PLAN NOTE
-blood cultures with staph epidermis and another CONS   -due to port placement ID consult placed  -stopped Rocephin and started on Vancomycin   -TTE negative for vegetations   -s/p Port removal with General Surgery 1/12/25  -repeat BC NGTD  -BC ordered 1/12/25  -if cultures remain negative x48 h possible for placement of new port. Cleared by Heme Onc and ID as cultures have been negative  -IR consult placed for port   -planning for Vancomycin with ALYSE 1/18/25

## 2025-01-14 NOTE — ASSESSMENT & PLAN NOTE
Patient's blood pressure range in the last 24 hours was: BP  Min: 129/83  Max: 159/92.The patient's inpatient anti-hypertensive regimen is listed below:  Current Antihypertensives  amLODIPine tablet 10 mg, Daily, Oral    Plan  - BP is controlled, no changes needed to their regimen

## 2025-01-14 NOTE — PLAN OF CARE
Problem: Adult Inpatient Plan of Care  Goal: Plan of Care Review  Outcome: Progressing  Goal: Patient-Specific Goal (Individualized)  Outcome: Progressing  Goal: Absence of Hospital-Acquired Illness or Injury  Outcome: Progressing  Goal: Optimal Comfort and Wellbeing  Outcome: Progressing  Goal: Readiness for Transition of Care  Outcome: Progressing     Problem: Sickle Cell Disease  Goal: Optimal Coping with Sickle Cell Disease  Outcome: Progressing  Goal: Effective Tissue Perfusion  Outcome: Progressing     Problem: Fall Injury Risk  Goal: Absence of Fall and Fall-Related Injury  Outcome: Progressing

## 2025-01-14 NOTE — SUBJECTIVE & OBJECTIVE
Interval History: Pt seen and examined this AM. ALEK. Still complaining of pain. Located around her chest and arms.       Objective:     Vital Signs (Most Recent):  Temp: 98.7 °F (37.1 °C) (01/14/25 1109)  Pulse: 88 (01/14/25 1109)  Resp: 18 (01/14/25 1109)  BP: 135/88 (01/14/25 1109)  SpO2: 99 % (01/14/25 1109) Vital Signs (24h Range):  Temp:  [98.1 °F (36.7 °C)-98.8 °F (37.1 °C)] 98.7 °F (37.1 °C)  Pulse:  [] 88  Resp:  [16-20] 18  SpO2:  [98 %-99 %] 99 %  BP: (129-159)/(83-96) 135/88     Weight: 93.4 kg (206 lb)  Body mass index is 37.68 kg/m².  No intake or output data in the 24 hours ending 01/14/25 1136      Physical Exam  Constitutional:       Appearance: Normal appearance.   HENT:      Head: Normocephalic and atraumatic.      Nose: Nose normal.      Mouth/Throat:      Mouth: Mucous membranes are moist.   Eyes:      Extraocular Movements: Extraocular movements intact.      Pupils: Pupils are equal, round, and reactive to light.   Cardiovascular:      Rate and Rhythm: Normal rate and regular rhythm.      Heart sounds: No murmur heard.     No gallop.   Pulmonary:      Effort: Pulmonary effort is normal.      Breath sounds: Normal breath sounds. No wheezing or rales.   Abdominal:      General: Abdomen is flat. Bowel sounds are normal. There is no distension.      Palpations: Abdomen is soft.      Tenderness: There is no abdominal tenderness.   Musculoskeletal:         General: No swelling or tenderness. Normal range of motion.      Cervical back: Normal range of motion and neck supple.      Right lower leg: No edema.      Left lower leg: No edema.   Skin:     General: Skin is warm and dry.      Capillary Refill: Capillary refill takes less than 2 seconds.      Comments: Scarring present on the right leg      Neurological:      General: No focal deficit present.      Mental Status: She is alert. Mental status is at baseline.   Psychiatric:         Mood and Affect: Mood normal.             Significant Labs:  All pertinent labs within the past 24 hours have been reviewed.    Significant Imaging: I have reviewed all pertinent imaging results/findings within the past 24 hours.

## 2025-01-14 NOTE — PROGRESS NOTES
Pharmacokinetic Assessment Follow Up: IV Vancomycin    Vancomycin serum concentration assessment(s)/plan:    - The trough level was drawn correctly and can be used to guide therapy at this time.   - The measurement is within the desired definitive target range of 15 to 20 mcg/mL.  - Continue vancomycin 1250 mg IV every 12 hours   - Next serum trough concentration measured at 0500 on 1/16    Drug levels (last 3 results):  Recent Labs   Lab Result Units 01/12/25  1328 01/14/25  0421   Vancomycin-Trough ug/mL 7.9* 17.0       Pharmacy will continue to follow and monitor vancomycin.    Please contact pharmacy at extension [phone] for questions regarding this assessment.    Thank you for the consult,   Bubba Loredo       Patient brief summary:  Nazanin Malone is a 44 y.o. female initiated on antimicrobial therapy with IV Vancomycin for treatment of bacteremia    Drug Allergies:   Review of patient's allergies indicates:   Allergen Reactions    Cat dander Anaphylaxis, Itching and Shortness Of Breath    Nsaids (non-steroidal anti-inflammatory drug) Itching and Anaphylaxis    Latex Rash       Actual Body Weight:   93.4 kg    Renal Function:   Estimated Creatinine Clearance: 83.1 mL/min (based on SCr of 0.9 mg/dL).,     Dialysis Method (if applicable):  N/A

## 2025-01-15 LAB
ALBUMIN SERPL BCP-MCNC: 3.3 G/DL (ref 3.5–5.2)
ALP SERPL-CCNC: 75 U/L (ref 40–150)
ALT SERPL W/O P-5'-P-CCNC: 16 U/L (ref 10–44)
ANION GAP SERPL CALC-SCNC: 8 MMOL/L (ref 8–16)
ANISOCYTOSIS BLD QL SMEAR: ABNORMAL
AST SERPL-CCNC: 21 U/L (ref 10–40)
BASOPHILS # BLD AUTO: 0.06 K/UL (ref 0–0.2)
BASOPHILS # BLD AUTO: 0.07 K/UL (ref 0–0.2)
BASOPHILS NFR BLD: 1 % (ref 0–1.9)
BASOPHILS NFR BLD: 1.1 % (ref 0–1.9)
BILIRUB SERPL-MCNC: 0.2 MG/DL (ref 0.1–1)
BUN SERPL-MCNC: 17 MG/DL (ref 6–20)
CALCIUM SERPL-MCNC: 9 MG/DL (ref 8.7–10.5)
CHLORIDE SERPL-SCNC: 107 MMOL/L (ref 95–110)
CO2 SERPL-SCNC: 25 MMOL/L (ref 23–29)
CREAT SERPL-MCNC: 1 MG/DL (ref 0.5–1.4)
DIFFERENTIAL METHOD BLD: ABNORMAL
DIFFERENTIAL METHOD BLD: ABNORMAL
EOSINOPHIL # BLD AUTO: 0.5 K/UL (ref 0–0.5)
EOSINOPHIL # BLD AUTO: 0.5 K/UL (ref 0–0.5)
EOSINOPHIL NFR BLD: 7.5 % (ref 0–8)
EOSINOPHIL NFR BLD: 8.2 % (ref 0–8)
ERYTHROCYTE [DISTWIDTH] IN BLOOD BY AUTOMATED COUNT: 22.2 % (ref 11.5–14.5)
ERYTHROCYTE [DISTWIDTH] IN BLOOD BY AUTOMATED COUNT: 22.4 % (ref 11.5–14.5)
EST. GFR  (NO RACE VARIABLE): >60 ML/MIN/1.73 M^2
GLUCOSE SERPL-MCNC: 85 MG/DL (ref 70–110)
HCT VFR BLD AUTO: 22.9 % (ref 37–48.5)
HCT VFR BLD AUTO: 24.7 % (ref 37–48.5)
HGB BLD-MCNC: 7.5 G/DL (ref 12–16)
HGB BLD-MCNC: 8.2 G/DL (ref 12–16)
HOWELL-JOLLY BOD BLD QL SMEAR: ABNORMAL
IMM GRANULOCYTES # BLD AUTO: 0.01 K/UL (ref 0–0.04)
IMM GRANULOCYTES # BLD AUTO: 0.01 K/UL (ref 0–0.04)
IMM GRANULOCYTES NFR BLD AUTO: 0.2 % (ref 0–0.5)
IMM GRANULOCYTES NFR BLD AUTO: 0.2 % (ref 0–0.5)
LYMPHOCYTES # BLD AUTO: 3.4 K/UL (ref 1–4.8)
LYMPHOCYTES # BLD AUTO: 3.9 K/UL (ref 1–4.8)
LYMPHOCYTES NFR BLD: 58.7 % (ref 18–48)
LYMPHOCYTES NFR BLD: 60.2 % (ref 18–48)
MAGNESIUM SERPL-MCNC: 1.7 MG/DL (ref 1.6–2.6)
MCH RBC QN AUTO: 21.5 PG (ref 27–31)
MCH RBC QN AUTO: 21.8 PG (ref 27–31)
MCHC RBC AUTO-ENTMCNC: 32.8 G/DL (ref 32–36)
MCHC RBC AUTO-ENTMCNC: 33.2 G/DL (ref 32–36)
MCV RBC AUTO: 66 FL (ref 82–98)
MCV RBC AUTO: 66 FL (ref 82–98)
MONOCYTES # BLD AUTO: 0.8 K/UL (ref 0.3–1)
MONOCYTES # BLD AUTO: 0.9 K/UL (ref 0.3–1)
MONOCYTES NFR BLD: 12 % (ref 4–15)
MONOCYTES NFR BLD: 14.7 % (ref 4–15)
NEUTROPHILS # BLD AUTO: 1 K/UL (ref 1.8–7.7)
NEUTROPHILS # BLD AUTO: 1.2 K/UL (ref 1.8–7.7)
NEUTROPHILS NFR BLD: 17.2 % (ref 38–73)
NEUTROPHILS NFR BLD: 19 % (ref 38–73)
NRBC BLD-RTO: 1 /100 WBC
NRBC BLD-RTO: 2 /100 WBC
PHOSPHATE SERPL-MCNC: 2.6 MG/DL (ref 2.7–4.5)
PLATELET # BLD AUTO: 137 K/UL (ref 150–450)
PLATELET # BLD AUTO: 147 K/UL (ref 150–450)
PLATELET BLD QL SMEAR: ABNORMAL
PMV BLD AUTO: 9 FL (ref 9.2–12.9)
PMV BLD AUTO: 9.5 FL (ref 9.2–12.9)
POIKILOCYTOSIS BLD QL SMEAR: ABNORMAL
POLYCHROMASIA BLD QL SMEAR: ABNORMAL
POTASSIUM SERPL-SCNC: 3.6 MMOL/L (ref 3.5–5.1)
PROT SERPL-MCNC: 6.9 G/DL (ref 6–8.4)
RBC # BLD AUTO: 3.49 M/UL (ref 4–5.4)
RBC # BLD AUTO: 3.77 M/UL (ref 4–5.4)
SCHISTOCYTES BLD QL SMEAR: ABNORMAL
SCHISTOCYTES BLD QL SMEAR: PRESENT
SICKLE CELLS BLD QL SMEAR: ABNORMAL
SODIUM SERPL-SCNC: 140 MMOL/L (ref 136–145)
TARGETS BLD QL SMEAR: ABNORMAL
WBC # BLD AUTO: 5.86 K/UL (ref 3.9–12.7)
WBC # BLD AUTO: 6.41 K/UL (ref 3.9–12.7)

## 2025-01-15 PROCEDURE — 85025 COMPLETE CBC W/AUTO DIFF WBC: CPT

## 2025-01-15 PROCEDURE — 85025 COMPLETE CBC W/AUTO DIFF WBC: CPT | Mod: 91 | Performed by: STUDENT IN AN ORGANIZED HEALTH CARE EDUCATION/TRAINING PROGRAM

## 2025-01-15 PROCEDURE — 80053 COMPREHEN METABOLIC PANEL: CPT | Performed by: STUDENT IN AN ORGANIZED HEALTH CARE EDUCATION/TRAINING PROGRAM

## 2025-01-15 PROCEDURE — 63600175 PHARM REV CODE 636 W HCPCS: Performed by: INTERNAL MEDICINE

## 2025-01-15 PROCEDURE — 11000001 HC ACUTE MED/SURG PRIVATE ROOM

## 2025-01-15 PROCEDURE — 99223 1ST HOSP IP/OBS HIGH 75: CPT | Mod: ,,, | Performed by: FAMILY MEDICINE

## 2025-01-15 PROCEDURE — 36415 COLL VENOUS BLD VENIPUNCTURE: CPT

## 2025-01-15 PROCEDURE — 25000003 PHARM REV CODE 250: Performed by: STUDENT IN AN ORGANIZED HEALTH CARE EDUCATION/TRAINING PROGRAM

## 2025-01-15 PROCEDURE — 25000003 PHARM REV CODE 250

## 2025-01-15 PROCEDURE — 83735 ASSAY OF MAGNESIUM: CPT | Performed by: STUDENT IN AN ORGANIZED HEALTH CARE EDUCATION/TRAINING PROGRAM

## 2025-01-15 PROCEDURE — 84100 ASSAY OF PHOSPHORUS: CPT | Performed by: STUDENT IN AN ORGANIZED HEALTH CARE EDUCATION/TRAINING PROGRAM

## 2025-01-15 PROCEDURE — 63600175 PHARM REV CODE 636 W HCPCS: Mod: JZ,TB | Performed by: STUDENT IN AN ORGANIZED HEALTH CARE EDUCATION/TRAINING PROGRAM

## 2025-01-15 RX ORDER — SODIUM,POTASSIUM PHOSPHATES 280-250MG
2 POWDER IN PACKET (EA) ORAL EVERY 4 HOURS
Status: COMPLETED | OUTPATIENT
Start: 2025-01-15 | End: 2025-01-15

## 2025-01-15 RX ORDER — HYDROMORPHONE HYDROCHLORIDE 1 MG/ML
2 INJECTION, SOLUTION INTRAMUSCULAR; INTRAVENOUS; SUBCUTANEOUS EVERY 4 HOURS PRN
Status: DISCONTINUED | OUTPATIENT
Start: 2025-01-15 | End: 2025-01-16

## 2025-01-15 RX ADMIN — SENNOSIDES AND DOCUSATE SODIUM 1 TABLET: 50; 8.6 TABLET ORAL at 10:01

## 2025-01-15 RX ADMIN — METHOCARBAMOL 1000 MG: 500 TABLET ORAL at 10:01

## 2025-01-15 RX ADMIN — VANCOMYCIN HYDROCHLORIDE 1250 MG: 1.25 INJECTION, POWDER, LYOPHILIZED, FOR SOLUTION INTRAVENOUS at 05:01

## 2025-01-15 RX ADMIN — SUCRALFATE 1 G: 1 TABLET ORAL at 04:01

## 2025-01-15 RX ADMIN — DIPHENHYDRAMINE HYDROCHLORIDE 25 MG: 50 INJECTION, SOLUTION INTRAMUSCULAR; INTRAVENOUS at 10:01

## 2025-01-15 RX ADMIN — DIPHENHYDRAMINE HYDROCHLORIDE 25 MG: 50 INJECTION, SOLUTION INTRAMUSCULAR; INTRAVENOUS at 12:01

## 2025-01-15 RX ADMIN — SUCRALFATE 1 G: 1 TABLET ORAL at 10:01

## 2025-01-15 RX ADMIN — DIPHENHYDRAMINE HYDROCHLORIDE 25 MG: 50 INJECTION, SOLUTION INTRAMUSCULAR; INTRAVENOUS at 02:01

## 2025-01-15 RX ADMIN — HYDROMORPHONE HYDROCHLORIDE 2 MG: 1 INJECTION, SOLUTION INTRAMUSCULAR; INTRAVENOUS; SUBCUTANEOUS at 02:01

## 2025-01-15 RX ADMIN — PANTOPRAZOLE SODIUM 40 MG: 40 TABLET, DELAYED RELEASE ORAL at 08:01

## 2025-01-15 RX ADMIN — DIPHENHYDRAMINE HYDROCHLORIDE 25 MG: 50 INJECTION, SOLUTION INTRAMUSCULAR; INTRAVENOUS at 07:01

## 2025-01-15 RX ADMIN — ACETAMINOPHEN 975 MG: 325 TABLET ORAL at 08:01

## 2025-01-15 RX ADMIN — OXYCODONE HYDROCHLORIDE 15 MG: 10 TABLET ORAL at 01:01

## 2025-01-15 RX ADMIN — METHOCARBAMOL 1000 MG: 500 TABLET ORAL at 08:01

## 2025-01-15 RX ADMIN — APIXABAN 5 MG: 5 TABLET, FILM COATED ORAL at 08:01

## 2025-01-15 RX ADMIN — SUCRALFATE 1 G: 1 TABLET ORAL at 05:01

## 2025-01-15 RX ADMIN — OXYCODONE HYDROCHLORIDE 15 MG: 10 TABLET ORAL at 05:01

## 2025-01-15 RX ADMIN — FOLIC ACID 1 MG: 1 TABLET ORAL at 08:01

## 2025-01-15 RX ADMIN — METHOCARBAMOL 1000 MG: 500 TABLET ORAL at 02:01

## 2025-01-15 RX ADMIN — DIPHENHYDRAMINE HYDROCHLORIDE 25 MG: 50 INJECTION, SOLUTION INTRAMUSCULAR; INTRAVENOUS at 04:01

## 2025-01-15 RX ADMIN — APIXABAN 5 MG: 5 TABLET, FILM COATED ORAL at 10:01

## 2025-01-15 RX ADMIN — POLYETHYLENE GLYCOL 3350 17 G: 17 POWDER, FOR SOLUTION ORAL at 08:01

## 2025-01-15 RX ADMIN — POTASSIUM & SODIUM PHOSPHATES POWDER PACK 280-160-250 MG 2 PACKET: 280-160-250 PACK at 02:01

## 2025-01-15 RX ADMIN — HYDROXYUREA 1000 MG: 500 CAPSULE ORAL at 08:01

## 2025-01-15 RX ADMIN — GABAPENTIN 600 MG: 300 CAPSULE ORAL at 08:01

## 2025-01-15 RX ADMIN — ACETAMINOPHEN 975 MG: 325 TABLET ORAL at 02:01

## 2025-01-15 RX ADMIN — HYDROMORPHONE HYDROCHLORIDE 2 MG: 1 INJECTION, SOLUTION INTRAMUSCULAR; INTRAVENOUS; SUBCUTANEOUS at 07:01

## 2025-01-15 RX ADMIN — SENNOSIDES AND DOCUSATE SODIUM 1 TABLET: 50; 8.6 TABLET ORAL at 08:01

## 2025-01-15 RX ADMIN — HYDROMORPHONE HYDROCHLORIDE 3 MG: 1 INJECTION, SOLUTION INTRAMUSCULAR; INTRAVENOUS; SUBCUTANEOUS at 12:01

## 2025-01-15 RX ADMIN — POTASSIUM & SODIUM PHOSPHATES POWDER PACK 280-160-250 MG 2 PACKET: 280-160-250 PACK at 05:01

## 2025-01-15 RX ADMIN — GABAPENTIN 600 MG: 300 CAPSULE ORAL at 10:01

## 2025-01-15 RX ADMIN — ACETAMINOPHEN 975 MG: 325 TABLET ORAL at 10:01

## 2025-01-15 RX ADMIN — ONDANSETRON 8 MG: 8 TABLET, ORALLY DISINTEGRATING ORAL at 12:01

## 2025-01-15 RX ADMIN — OXYCODONE HYDROCHLORIDE 15 MG: 10 TABLET ORAL at 03:01

## 2025-01-15 RX ADMIN — PANTOPRAZOLE SODIUM 40 MG: 40 TABLET, DELAYED RELEASE ORAL at 10:01

## 2025-01-15 RX ADMIN — HYDROMORPHONE HYDROCHLORIDE 3 MG: 1 INJECTION, SOLUTION INTRAMUSCULAR; INTRAVENOUS; SUBCUTANEOUS at 04:01

## 2025-01-15 RX ADMIN — OXYCODONE HYDROCHLORIDE 15 MG: 10 TABLET ORAL at 10:01

## 2025-01-15 RX ADMIN — OXYCODONE HYDROCHLORIDE 15 MG: 10 TABLET ORAL at 08:01

## 2025-01-15 RX ADMIN — HYDROMORPHONE HYDROCHLORIDE 3 MG: 1 INJECTION, SOLUTION INTRAMUSCULAR; INTRAVENOUS; SUBCUTANEOUS at 10:01

## 2025-01-15 RX ADMIN — AMLODIPINE BESYLATE 10 MG: 10 TABLET ORAL at 08:01

## 2025-01-15 RX ADMIN — GABAPENTIN 600 MG: 300 CAPSULE ORAL at 02:01

## 2025-01-15 NOTE — HOSPITAL COURSE
44-year-old female with sickle cell beta thalassemia zero, multiple pulmonary emboli on chronic AC, asthma, and HTN who was admitted for acute sickle cell pain crisis.Initiated on oxy/dilaudid/benadryl prn. Blood cultures positive for CONS and due to port ID was consulted and recommended port removal with IV vanc. Recommended course until 1/19/25. Patient wanting new port during hospitalization. Discussed with IR after cultures negative at 48 hours and decision made for outpatient port. Started on PCA pump due to difficulty weaning Dilaudid. U/S LUE revealed occlusive thrombus of L cephalic vein and partial occlusive thrombus of L IJ improved from previous. Heat compresses and prn pain control as needed. She continue on PCA pump until she was ready to be taken off. H&H remained over 7 throughout hospital stay and no transfusions given. Weaned off PCA pump 1/25/24. Planned for discharge home with close Heme Onc follow up.     Pt deemed appropriate for discharge; seen and examined prior to departure. Plan discussed with pt, who was agreeable and amenable; medications were discussed and reviewed, outpatient follow-up scheduled, ER precautions were given, all questions were answered to the pt's satisfaction, and she was subsequently discharged.

## 2025-01-15 NOTE — ASSESSMENT & PLAN NOTE
-blood cultures with staph epidermis and another CONS   -due to port placement ID consult placed  -stopped Rocephin and started on Vancomycin   -TTE negative for vegetations   -s/p Port removal with General Surgery 1/12/25  -repeat BC NGTD  -BC ordered 1/12/25  -Cleared by Heme Onc and ID as cultures have been negative  -IR consult placed for port , recommended to be done OP  -planning for Vancomycin with ALYSE 1/18/25

## 2025-01-15 NOTE — PLAN OF CARE
[facility] - Med Surg  Discharge Reassessment    Primary Care Provider: No, Primary Doctor    Expected Discharge Date: 1/17/2025        Elif met with pt at bedside. Pt stated that she is still in pain, however no services with case management are needed. Pt is not medically ready for discharge. Elif will follow up with pt before dc.    Discharge Plan A and Plan B have been determined by review of patient's clinical status, future medical and therapeutic needs, and coverage/benefits for post-acute care in coordination with multidisciplinary team members.    Reassessment (most recent)       Discharge Reassessment - 01/15/25 1600          Discharge Reassessment    Assessment Type Discharge Planning Reassessment (P)      Did the patient's condition or plan change since previous assessment? No (P)      Discharge Plan discussed with: Patient (P)      Communicated ALYSE with patient/caregiver Yes (P)      Discharge Plan A Home with family (P)      Discharge Plan B Home (P)      DME Needed Upon Discharge  none (P)      Transition of Care Barriers None (P)      Why the patient remains in the hospital Requires continued medical care (P)         Post-Acute Status    Post-Acute Authorization Other (P)      Coverage [employer] MANAGED MEDICARE - [employer] MEDICARE PLAN HMO (P)      Other Status No Post-Acute Service Needs (P)      Discharge Delays None known at this time (P)                    ELIF Nichole  Case Management  [phone]

## 2025-01-15 NOTE — PROGRESS NOTES
"[facility] Medicine  Progress Note    Patient Name: Nazanin Malone  MRN: [id number]  Patient Class: IP- Inpatient   Admission Date: 1/7/2025  Length of Stay: 7 days  Attending Physician: Deb Quiroz MD  Primary Care Provider: Kezia, Primary Doctor        Subjective     Principal Problem:Vaso-occlusive pain due to sickle cell disease        HPI:  Nazanin Malone is a 44-year-old female with sickle cell beta thalassemia zero, multiple pulmonary emboli on chronic AC, asthma, and HTN who presented to [facility] ED 1/4/25 for diffuse myalgias and pain consistent with a sickle cell pain crisis as well as fever, nausea, vomiting, and diarrhea for the past day. She was recently admitted to [facility] 12/29/24 - 1/5/25 for pain c/w sickle cell pain crises which was refractory to her home regimen of tylenol, oxycodone 15mg, and muscle relaxers. She reported that she moved to the area about 6 weeks ago after living in [place]. She was receiving chronic exchange transfusions there but is not currently receiving them as part of her therapy here. She is seeing a hematologist. She also reports that she previously was on MS contin, pen VK, and hydroxurea as part of her home regimen but has not been put back on those since she has moved here. Of note, during that recent admission, the provider was informed by nursing that the patient asked if she could be given a little more medicine than what was prescribed stating "no one has to know". She also asked if nursing needed some help to throw away an empty flush syringe and an empty med syringe. At that time, she was prescribed Dilaudid 3mg IV q3h PRN and IVF with improvement in her pain.    In the ED, /99 HR 74 RR 20 sats adequate on ambient air. Tmax 100.9. Labs notable for Hgb 9.8 and K 2.9. LA wnl. COVID, Flu, and RSV negative. Blood cultures were obtained. UA was ordered. CXR showed . Received Dilaudid 3mg IV in total, Tylenol 1g, Benadryl 25mg PO, Zofran 4mg IV, " and potassium 25mEq PO.    The patient was admitted to Hospital Medicine for further workup and management.    Overview/Hospital Course:  No notes on file    Interval History: Pt seen and examined this AM. GINIEO. Still complaining of pain. Located around her chest and arms.       Objective:     Vital Signs (Most Recent):  Temp: 98.7 °F (37.1 °C) (01/14/25 1109)  Pulse: 88 (01/14/25 1109)  Resp: 18 (01/14/25 1109)  BP: 135/88 (01/14/25 1109)  SpO2: 99 % (01/14/25 1109) Vital Signs (24h Range):  Temp:  [98.1 °F (36.7 °C)-98.8 °F (37.1 °C)] 98.7 °F (37.1 °C)  Pulse:  [] 88  Resp:  [16-20] 18  SpO2:  [98 %-99 %] 99 %  BP: (129-159)/(83-96) 135/88     Weight: 93.4 kg (206 lb)  Body mass index is 37.68 kg/m².  No intake or output data in the 24 hours ending 01/14/25 1136      Physical Exam  Constitutional:       Appearance: Normal appearance.   HENT:      Head: Normocephalic and atraumatic.      Nose: Nose normal.      Mouth/Throat:      Mouth: Mucous membranes are moist.   Eyes:      Extraocular Movements: Extraocular movements intact.      Pupils: Pupils are equal, round, and reactive to light.   Cardiovascular:      Rate and Rhythm: Normal rate and regular rhythm.      Heart sounds: No murmur heard.     No gallop.   Pulmonary:      Effort: Pulmonary effort is normal.      Breath sounds: Normal breath sounds. No wheezing or rales.   Abdominal:      General: Abdomen is flat. Bowel sounds are normal. There is no distension.      Palpations: Abdomen is soft.      Tenderness: There is no abdominal tenderness.   Musculoskeletal:         General: No swelling or tenderness. Normal range of motion.      Cervical back: Normal range of motion and neck supple.      Right lower leg: No edema.      Left lower leg: No edema.   Skin:     General: Skin is warm and dry.      Capillary Refill: Capillary refill takes less than 2 seconds.      Comments: Scarring present on the right leg      Neurological:      General: No focal  deficit present.      Mental Status: She is alert. Mental status is at baseline.   Psychiatric:         Mood and Affect: Mood normal.             Significant Labs: All pertinent labs within the past 24 hours have been reviewed.    Significant Imaging: I have reviewed all pertinent imaging results/findings within the past 24 hours.    Assessment and Plan     * Vaso-occlusive pain due to sickle cell disease  44-year-old female with sickle cell beta thalassemia zero, multiple pulmonary emboli on chronic AC, asthma, and HTN who presented to [facility] ED 1/4/25 for diffuse myalgias and pain consistent with a sickle cell pain crisis as well as fever, nausea, vomiting, and diarrhea for the past day. She was recently admitted to [facility] 12/29/24 - 1/5/25 for a pain crisis.  CXR without focal consolidation. I am not concerned for acute chest at this time.    Plan  - Multimodal pain regimen: scheduled tylenol, robaxin, and oxycodone PRN   - Dilaudid 3mg IV q3h prn,  attempted to wean to 2 mg without success. Wean to to 2 mg q4h   - mIVF  - Folic acid, hydroxyurea and other home SCD medications reordered  - repeat CXR 1/11/25 without signs of acute chest, atelectasis present will order incentive spirometry     Bacteremia due to Staphylococcus epidermidis  -blood cultures with staph epidermis and another CONS   -due to port placement ID consult placed  -stopped Rocephin and started on Vancomycin   -TTE negative for vegetations   -s/p Port removal with General Surgery 1/12/25  -repeat BC NGTD  -BC ordered 1/12/25  -Cleared by Heme Onc and ID as cultures have been negative  -IR consult placed for port   -planning for Vancomycin with ALYSE 1/18/25          Fever  Tmax 100.6 in the ED.    Plan  -see bacteremia plan       Diarrhea  Resolved     Plan  - C diff unable to be ordered       Chronic anticoagulation  See History of PE      History of pulmonary embolism  Chronic and stable.    Plan  - Continue home Eliquis  - Consider CTA for pleuritic  pain and hypoxia      Hypertension  Patient's blood pressure range in the last 24 hours was: BP  Min: 129/83  Max: 159/92.The patient's inpatient anti-hypertensive regimen is listed below:  Current Antihypertensives  amLODIPine tablet 10 mg, Daily, Oral    Plan  - BP is controlled, no changes needed to their regimen    Intermittent asthma  Chronic and stable. Not in respiratory distress.    Plan  - Continue home albuterol PRN      Chronic prescription opiate use  Uses  tylenol, oxycodone 15mg, and muscle relaxers. Pain has been refractory to these due to reported emesis.    Plan  - See Vaso-occlusive pain      Chronic gastritis  Chronic and stable.    Plan  - Continue home PPI and carafate      Chest wall pain  -pain on the left side of the chest and shoulder area  -worse with palpation   -EKG and troponin negative   -echo without significant abnormalities   -seems to be MSK related, if worsens or vital sign changes or hypoxia will consider CTPE         VTE Risk Mitigation (From admission, onward)           Ordered     apixaban tablet 5 mg  2 times daily         01/12/25 1258     IP VTE HIGH RISK PATIENT  Once         01/07/25 2326     Place sequential compression device  Until discontinued         01/07/25 2326                    Discharge Planning   ALYSE: 1/17/2025     Code Status: Full Code   Medical Readiness for Discharge Date:   Discharge Plan A: Home with family   Discharge Delays: None known at this time                    Deb Quiroz MD  Department of Hospital Medicine   [facility]

## 2025-01-15 NOTE — PLAN OF CARE
Problem: Adult Inpatient Plan of Care  Goal: Plan of Care Review  Outcome: Progressing  Goal: Patient-Specific Goal (Individualized)  Outcome: Progressing  Goal: Absence of Hospital-Acquired Illness or Injury  Outcome: Progressing  Goal: Optimal Comfort and Wellbeing  Outcome: Progressing  Goal: Readiness for Transition of Care  Outcome: Progressing     Problem: Sickle Cell Disease  Goal: Optimal Pain Control and Function  Outcome: Progressing  Goal: Optimal Oxygenation  Outcome: Progressing     Problem: Fall Injury Risk  Goal: Absence of Fall and Fall-Related Injury  Outcome: Progressing

## 2025-01-15 NOTE — CONSULTS
Interventional Radiology  Consult/History & Physical Note    Consult Requested By: Deb Quiroz MD  Reason for Consult: port placement    SUBJECTIVE:     Chief Complaint:  sickle cell crisis    History of Present Illness:  Nazanin Malone is a 44 y.o. female with a PMHx of sickle cell beta thalassemia zero, multiple pulmonary emboli on chronic AC, asthma, and HTN who was admitted on 1/7/2025 for sickle cell crisis. Hospital course notable for right permcath removal on 1/12/2025. Interventional Radiology has been consulted for chest port placement for  exchange infusions . She currently does not have  long term IV access  in place. Her WBC is 5.86. Pt is afebrile and hemodynamically stable.     Review of Systems   Constitutional:  Negative for chills and fever.   Respiratory:  Negative for cough and shortness of breath.    Cardiovascular:  Negative for chest pain and leg swelling.   Gastrointestinal:  Negative for abdominal pain.   Musculoskeletal:  Positive for myalgias.       Scheduled Meds:   acetaminophen  975 mg Oral TID    amLODIPine  10 mg Oral Daily    apixaban  5 mg Oral BID    folic acid  1 mg Oral Daily    gabapentin  600 mg Oral TID    hydroxyurea  1,000 mg Oral Daily    LIDOcaine HCL 20 mg/ml (2%)  1 mL Other Once    methocarbamoL  1,000 mg Oral TID    pantoprazole  40 mg Oral BID    polyethylene glycol  17 g Oral Daily    senna-docusate 8.6-50 mg  1 tablet Oral BID    sucralfate  1 g Oral QID (AC & HS)    vancomycin (VANCOCIN) IV (PEDS and ADULTS)  1,250 mg Intravenous Q12H     Continuous Infusions:  PRN Meds:  Current Facility-Administered Medications:     albuterol, 2 puff, Inhalation, Q6H PRN    calcium carbonate, 1,000 mg, Oral, TID PRN    dextrose 50%, 12.5 g, Intravenous, PRN    dextrose 50%, 25 g, Intravenous, PRN    diphenhydrAMINE, 25 mg, Intravenous, Q4H PRN    diphenhydrAMINE-zinc acetate 2-0.1%, , Topical (Top), TID PRN    glucagon (human recombinant), 1 mg, Intramuscular, PRN     glucose, 16 g, Oral, PRN    glucose, 24 g, Oral, PRN    HYDROmorphone, 3 mg, Intravenous, Q4H PRN    melatonin, 6 mg, Oral, Nightly PRN    naloxone, 0.2 mg, Intravenous, PRN    ondansetron, 8 mg, Oral, Q8H PRN    oxyCODONE, 15 mg, Oral, Q4H PRN    prochlorperazine, 10 mg, Oral, Q6H PRN    sodium chloride 0.9%, 10 mL, Intravenous, PRN    Pharmacy to dose Vancomycin consult, , , Once **AND** vancomycin - pharmacy to dose, , Intravenous, pharmacy to manage frequency    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    Asthma     Avascular necrosis of femur     Depression     History of pulmonary embolism     Hypertension     Morbid obesity     Opioid dependence 2017    Pneumonia due to Streptococcus pneumoniae 2017    Right lower lobe pneumonia 2017    Sepsis due to Streptococcus pneumoniae 2017    Sickle cell-beta thalassemia disease with pain     Trigeminal neuralgia      Past Surgical History:   Procedure Laterality Date     SECTION      ESOPHAGOGASTRODUODENOSCOPY N/A 2024    Procedure: EGD (ESOPHAGOGASTRODUODENOSCOPY);  Surgeon: Allen Marshall MD;  Location: [address];  Service: Gastroenterology;  Laterality: N/A;    TONSILLECTOMY      TUBAL LIGATION       Family History   Problem Relation Name Age of Onset    Heart disease Mother      Diabetes Mother      Heart disease Father      Breast cancer Neg Hx      Ovarian cancer Neg Hx      Colon cancer Neg Hx       Social History     Tobacco Use    Smoking status: Never    Smokeless tobacco: Never   Substance Use Topics    Alcohol use: No    Drug use: Yes     Types: Marijuana     Comment: periodically         OBJECTIVE:     Vital Signs (Most Recent)  Temp: 98.4 °F (36.9 °C) (01/15/25 0748)  Pulse: 83 (01/15/25 0748)  Resp: 18 (01/15/25 0854)  BP: 133/79 (01/15/25 0748)  SpO2: (!) 94 % (01/15/25 0748)    Physical Exam:  Physical Exam  Constitutional:       General: She is not in acute distress.  HENT:      Head: Normocephalic and atraumatic.   Eyes:      General: No scleral icterus.  Cardiovascular:      Rate and Rhythm: Normal rate.      Pulses: Normal pulses.   Pulmonary:      Effort: Pulmonary effort is normal. No respiratory distress.   Abdominal:      General: There is no distension.   Skin:     General: Skin is warm and dry.   Neurological:      Mental Status: She is alert and oriented to person, place, and time.   Psychiatric:         Mood and Affect: Mood normal.         Laboratory  I have reviewed all pertinent lab results within the past 24 hours.    Imaging:  Recent imaging studies reviewed.     ASSESSMENT/PLAN:     Assessment:  44 y.o. female with a PMHx of sickle cell beta thalassemia zero, multiple pulmonary emboli on chronic AC, asthma, and HTN who has been referred to IR for chest port placement for  exchange infusions for sickle cell . The procedure was discussed in great detail with the patient including thorough explanations of the potential risks and benefits of chest port placement. Risks include but are not limited to bleeding at the puncture site, infection, port related thrombus, port dysfunction, central vein stenosis and need for additional procedures. The patient is a candidate for chest port placement under general anesthesia. Plan discussed with ordering physician and pt who verbalized understanding of the plan and would like to proceed.    Plan:  Will proceed with chest port placement  as an outpatient. IR schedulers will call to schedule once patient has been discharged.    Thank you for the consult. Please contact with questions via [employer] secure chat     MONY Craig,  FNP  Interventional Radiology

## 2025-01-15 NOTE — PLAN OF CARE
Problem: Adult Inpatient Plan of Care  Goal: Plan of Care Review  1/15/2025 0427 by Farhana Palomares RN  Outcome: Progressing  1/14/2025 2029 by Farhana Palomares RN  Outcome: Progressing  Goal: Patient-Specific Goal (Individualized)  1/15/2025 0427 by Farhana Palomares RN  Outcome: Progressing  1/14/2025 2029 by Farhana Palomares RN  Outcome: Progressing  Goal: Absence of Hospital-Acquired Illness or Injury  1/15/2025 0427 by Farhana Palomares RN  Outcome: Progressing  1/14/2025 2029 by Farhana Palomares RN  Outcome: Progressing  Goal: Optimal Comfort and Wellbeing  1/15/2025 0427 by Farhana Palomares RN  Outcome: Progressing  1/14/2025 2029 by Farhana Palomares RN  Outcome: Progressing  Goal: Readiness for Transition of Care  1/15/2025 0427 by Farhana Palomares RN  Outcome: Progressing  1/14/2025 2029 by Farhana Palomares RN  Outcome: Progressing     Problem: Sickle Cell Disease  Goal: Optimal Pain Control and Function  Outcome: Progressing  Goal: Optimal Oxygenation  Outcome: Progressing     Problem: Fall Injury Risk  Goal: Absence of Fall and Fall-Related Injury  Outcome: Progressing

## 2025-01-16 PROBLEM — R19.7 DIARRHEA: Status: RESOLVED | Noted: 2024-11-11 | Resolved: 2025-01-16

## 2025-01-16 PROBLEM — R07.89 CHEST WALL PAIN: Status: RESOLVED | Noted: 2025-01-11 | Resolved: 2025-01-16

## 2025-01-16 PROBLEM — R50.9 FEVER: Status: RESOLVED | Noted: 2025-01-08 | Resolved: 2025-01-16

## 2025-01-16 LAB
ALBUMIN SERPL BCP-MCNC: 3.3 G/DL (ref 3.5–5.2)
ALP SERPL-CCNC: 75 U/L (ref 40–150)
ALT SERPL W/O P-5'-P-CCNC: 22 U/L (ref 10–44)
ANION GAP SERPL CALC-SCNC: 6 MMOL/L (ref 8–16)
AST SERPL-CCNC: 31 U/L (ref 10–40)
BASOPHILS # BLD AUTO: 0.07 K/UL (ref 0–0.2)
BASOPHILS NFR BLD: 1 % (ref 0–1.9)
BILIRUB SERPL-MCNC: 0.2 MG/DL (ref 0.1–1)
BUN SERPL-MCNC: 19 MG/DL (ref 6–20)
CALCIUM SERPL-MCNC: 9.1 MG/DL (ref 8.7–10.5)
CHLORIDE SERPL-SCNC: 106 MMOL/L (ref 95–110)
CO2 SERPL-SCNC: 26 MMOL/L (ref 23–29)
CREAT SERPL-MCNC: 0.9 MG/DL (ref 0.5–1.4)
DIFFERENTIAL METHOD BLD: ABNORMAL
EOSINOPHIL # BLD AUTO: 0.5 K/UL (ref 0–0.5)
EOSINOPHIL NFR BLD: 6.8 % (ref 0–8)
ERYTHROCYTE [DISTWIDTH] IN BLOOD BY AUTOMATED COUNT: 22.5 % (ref 11.5–14.5)
EST. GFR  (NO RACE VARIABLE): >60 ML/MIN/1.73 M^2
GLUCOSE SERPL-MCNC: 82 MG/DL (ref 70–110)
HCT VFR BLD AUTO: 24.8 % (ref 37–48.5)
HGB BLD-MCNC: 8.3 G/DL (ref 12–16)
IMM GRANULOCYTES # BLD AUTO: 0.02 K/UL (ref 0–0.04)
IMM GRANULOCYTES NFR BLD AUTO: 0.3 % (ref 0–0.5)
LYMPHOCYTES # BLD AUTO: 3.2 K/UL (ref 1–4.8)
LYMPHOCYTES NFR BLD: 46.5 % (ref 18–48)
MAGNESIUM SERPL-MCNC: 1.8 MG/DL (ref 1.6–2.6)
MCH RBC QN AUTO: 21.8 PG (ref 27–31)
MCHC RBC AUTO-ENTMCNC: 33.5 G/DL (ref 32–36)
MCV RBC AUTO: 65 FL (ref 82–98)
MONOCYTES # BLD AUTO: 0.8 K/UL (ref 0.3–1)
MONOCYTES NFR BLD: 11.4 % (ref 4–15)
NEUTROPHILS # BLD AUTO: 2.3 K/UL (ref 1.8–7.7)
NEUTROPHILS NFR BLD: 34 % (ref 38–73)
NRBC BLD-RTO: 3 /100 WBC
PHOSPHATE SERPL-MCNC: 4.1 MG/DL (ref 2.7–4.5)
PLATELET # BLD AUTO: 122 K/UL (ref 150–450)
PMV BLD AUTO: 8.8 FL (ref 9.2–12.9)
POTASSIUM SERPL-SCNC: 4.1 MMOL/L (ref 3.5–5.1)
PROT SERPL-MCNC: 6.9 G/DL (ref 6–8.4)
RBC # BLD AUTO: 3.8 M/UL (ref 4–5.4)
SODIUM SERPL-SCNC: 138 MMOL/L (ref 136–145)
VANCOMYCIN TROUGH SERPL-MCNC: 22 UG/ML (ref 10–22)
VANCOMYCIN TROUGH SERPL-MCNC: 45 UG/ML (ref 10–22)
WBC # BLD AUTO: 6.78 K/UL (ref 3.9–12.7)

## 2025-01-16 PROCEDURE — 63600175 PHARM REV CODE 636 W HCPCS: Mod: JZ,TB | Performed by: STUDENT IN AN ORGANIZED HEALTH CARE EDUCATION/TRAINING PROGRAM

## 2025-01-16 PROCEDURE — 80053 COMPREHEN METABOLIC PANEL: CPT

## 2025-01-16 PROCEDURE — 25000003 PHARM REV CODE 250: Performed by: STUDENT IN AN ORGANIZED HEALTH CARE EDUCATION/TRAINING PROGRAM

## 2025-01-16 PROCEDURE — 36415 COLL VENOUS BLD VENIPUNCTURE: CPT | Performed by: STUDENT IN AN ORGANIZED HEALTH CARE EDUCATION/TRAINING PROGRAM

## 2025-01-16 PROCEDURE — 80202 ASSAY OF VANCOMYCIN: CPT | Performed by: STUDENT IN AN ORGANIZED HEALTH CARE EDUCATION/TRAINING PROGRAM

## 2025-01-16 PROCEDURE — 63600175 PHARM REV CODE 636 W HCPCS: Performed by: STUDENT IN AN ORGANIZED HEALTH CARE EDUCATION/TRAINING PROGRAM

## 2025-01-16 PROCEDURE — 25000003 PHARM REV CODE 250

## 2025-01-16 PROCEDURE — 11000001 HC ACUTE MED/SURG PRIVATE ROOM

## 2025-01-16 PROCEDURE — 84100 ASSAY OF PHOSPHORUS: CPT

## 2025-01-16 PROCEDURE — 80202 ASSAY OF VANCOMYCIN: CPT | Mod: 91 | Performed by: STUDENT IN AN ORGANIZED HEALTH CARE EDUCATION/TRAINING PROGRAM

## 2025-01-16 PROCEDURE — 83735 ASSAY OF MAGNESIUM: CPT

## 2025-01-16 PROCEDURE — 85025 COMPLETE CBC W/AUTO DIFF WBC: CPT

## 2025-01-16 PROCEDURE — 63600175 PHARM REV CODE 636 W HCPCS: Performed by: INTERNAL MEDICINE

## 2025-01-16 RX ORDER — HYDROMORPHONE HYDROCHLORIDE 1 MG/ML
2 INJECTION, SOLUTION INTRAMUSCULAR; INTRAVENOUS; SUBCUTANEOUS
Status: DISCONTINUED | OUTPATIENT
Start: 2025-01-16 | End: 2025-01-21

## 2025-01-16 RX ADMIN — DIPHENHYDRAMINE HYDROCHLORIDE 25 MG: 50 INJECTION, SOLUTION INTRAMUSCULAR; INTRAVENOUS at 12:01

## 2025-01-16 RX ADMIN — GABAPENTIN 600 MG: 300 CAPSULE ORAL at 08:01

## 2025-01-16 RX ADMIN — OXYCODONE HYDROCHLORIDE 15 MG: 10 TABLET ORAL at 10:01

## 2025-01-16 RX ADMIN — AMLODIPINE BESYLATE 10 MG: 10 TABLET ORAL at 09:01

## 2025-01-16 RX ADMIN — DIPHENHYDRAMINE HYDROCHLORIDE 25 MG: 50 INJECTION, SOLUTION INTRAMUSCULAR; INTRAVENOUS at 07:01

## 2025-01-16 RX ADMIN — HYDROMORPHONE HYDROCHLORIDE 2 MG: 1 INJECTION, SOLUTION INTRAMUSCULAR; INTRAVENOUS; SUBCUTANEOUS at 07:01

## 2025-01-16 RX ADMIN — HYDROMORPHONE HYDROCHLORIDE 2 MG: 1 INJECTION, SOLUTION INTRAMUSCULAR; INTRAVENOUS; SUBCUTANEOUS at 08:01

## 2025-01-16 RX ADMIN — OXYCODONE HYDROCHLORIDE 15 MG: 10 TABLET ORAL at 02:01

## 2025-01-16 RX ADMIN — OXYCODONE HYDROCHLORIDE 15 MG: 10 TABLET ORAL at 09:01

## 2025-01-16 RX ADMIN — ACETAMINOPHEN 975 MG: 325 TABLET ORAL at 08:01

## 2025-01-16 RX ADMIN — HYDROMORPHONE HYDROCHLORIDE 2 MG: 1 INJECTION, SOLUTION INTRAMUSCULAR; INTRAVENOUS; SUBCUTANEOUS at 12:01

## 2025-01-16 RX ADMIN — METHOCARBAMOL 1000 MG: 500 TABLET ORAL at 02:01

## 2025-01-16 RX ADMIN — SENNOSIDES AND DOCUSATE SODIUM 1 TABLET: 50; 8.6 TABLET ORAL at 08:01

## 2025-01-16 RX ADMIN — DIPHENHYDRAMINE HYDROCHLORIDE 25 MG: 50 INJECTION, SOLUTION INTRAMUSCULAR; INTRAVENOUS at 08:01

## 2025-01-16 RX ADMIN — APIXABAN 5 MG: 5 TABLET, FILM COATED ORAL at 09:01

## 2025-01-16 RX ADMIN — METHOCARBAMOL 1000 MG: 500 TABLET ORAL at 08:01

## 2025-01-16 RX ADMIN — METHOCARBAMOL 1000 MG: 500 TABLET ORAL at 09:01

## 2025-01-16 RX ADMIN — GABAPENTIN 600 MG: 300 CAPSULE ORAL at 09:01

## 2025-01-16 RX ADMIN — VANCOMYCIN HYDROCHLORIDE 1250 MG: 1.25 INJECTION, POWDER, LYOPHILIZED, FOR SOLUTION INTRAVENOUS at 05:01

## 2025-01-16 RX ADMIN — SUCRALFATE 1 G: 1 TABLET ORAL at 04:01

## 2025-01-16 RX ADMIN — DIPHENHYDRAMINE HYDROCHLORIDE 25 MG: 50 INJECTION, SOLUTION INTRAMUSCULAR; INTRAVENOUS at 04:01

## 2025-01-16 RX ADMIN — ACETAMINOPHEN 975 MG: 325 TABLET ORAL at 02:01

## 2025-01-16 RX ADMIN — GABAPENTIN 600 MG: 300 CAPSULE ORAL at 02:01

## 2025-01-16 RX ADMIN — PANTOPRAZOLE SODIUM 40 MG: 40 TABLET, DELAYED RELEASE ORAL at 08:01

## 2025-01-16 RX ADMIN — SUCRALFATE 1 G: 1 TABLET ORAL at 08:01

## 2025-01-16 RX ADMIN — SENNOSIDES AND DOCUSATE SODIUM 1 TABLET: 50; 8.6 TABLET ORAL at 09:01

## 2025-01-16 RX ADMIN — VANCOMYCIN HYDROCHLORIDE 1000 MG: 1 INJECTION, POWDER, LYOPHILIZED, FOR SOLUTION INTRAVENOUS at 09:01

## 2025-01-16 RX ADMIN — PANTOPRAZOLE SODIUM 40 MG: 40 TABLET, DELAYED RELEASE ORAL at 09:01

## 2025-01-16 RX ADMIN — FOLIC ACID 1 MG: 1 TABLET ORAL at 09:01

## 2025-01-16 RX ADMIN — HYDROXYUREA 1000 MG: 500 CAPSULE ORAL at 09:01

## 2025-01-16 RX ADMIN — HYDROMORPHONE HYDROCHLORIDE 2 MG: 1 INJECTION, SOLUTION INTRAMUSCULAR; INTRAVENOUS; SUBCUTANEOUS at 04:01

## 2025-01-16 RX ADMIN — SUCRALFATE 1 G: 1 TABLET ORAL at 10:01

## 2025-01-16 RX ADMIN — APIXABAN 5 MG: 5 TABLET, FILM COATED ORAL at 08:01

## 2025-01-16 RX ADMIN — OXYCODONE HYDROCHLORIDE 15 MG: 10 TABLET ORAL at 06:01

## 2025-01-16 RX ADMIN — ACETAMINOPHEN 975 MG: 325 TABLET ORAL at 09:01

## 2025-01-16 NOTE — PROGRESS NOTES
Pharmacokinetic Assessment Follow Up: IV Vancomycin    Vancomycin serum concentration assessment(s):    - The trough level was drawn incorrectly and cannot be used to guide therapy at this time.  - The level was drawn roughly 40 minutes after morning dose began infusing.  - Renal function is stable. Out of caution, will discontinue scheduled vancomycin and order another trough   - Next level 1/16 @ 1700 prior to when next dose would be due  - If level is within range (15-20 mcg/mL), recommend restarting vancomycin 1250mg every 12 hours     Drug levels (last 3 results):  Recent Labs   Lab Result Units 01/14/25  0421 01/16/25  0634   Vancomycin-Trough ug/mL 17.0 45.0*       Pharmacy will continue to follow and monitor vancomycin.    Please contact pharmacy at extension [phone] for questions regarding this assessment.    Thank you for the consult,   Bubba Loredo       Patient brief summary:  Nazanin Malone is a 44 y.o. female initiated on antimicrobial therapy with IV Vancomycin for treatment of bacteremia    Drug Allergies:   Review of patient's allergies indicates:   Allergen Reactions    Cat dander Anaphylaxis, Itching and Shortness Of Breath    Nsaids (non-steroidal anti-inflammatory drug) Itching and Anaphylaxis    Latex Rash       Actual Body Weight:   93.4 kg    Renal Function:   Estimated Creatinine Clearance: 83.1 mL/min (based on SCr of 0.9 mg/dL).,     Dialysis Method (if applicable):  N/A

## 2025-01-16 NOTE — ASSESSMENT & PLAN NOTE
44-year-old female with sickle cell beta thalassemia zero, multiple pulmonary emboli on chronic AC, asthma, and HTN who presented to [facility] ED 1/4/25 for diffuse myalgias and pain consistent with a sickle cell pain crisis as well as fever, nausea, vomiting, and diarrhea for the past day. She was recently admitted to [facility] 12/29/24 - 1/5/25 for a pain crisis.  CXR without focal consolidation. I am not concerned for acute chest at this time.    Plan  - Multimodal pain regimen: scheduled tylenol, robaxin, and oxycodone PRN   - Dilaudid 2mg IV q3h prn,  - mIVF  - Folic acid, hydroxyurea and other home SCD medications reordered  - repeat CXR 1/11/25 without signs of acute chest, atelectasis present .incentive spirometry

## 2025-01-16 NOTE — PLAN OF CARE
Pt is AAOX4. PT Received IV Dilaudid and oral oxycodone for pain. Pt stated pain was radiating from right shoulder to fingers. MD notified.   Problem: Adult Inpatient Plan of Care  Goal: Plan of Care Review  Outcome: Progressing  Goal: Patient-Specific Goal (Individualized)  Outcome: Progressing  Goal: Absence of Hospital-Acquired Illness or Injury  Outcome: Progressing  Goal: Optimal Comfort and Wellbeing  Outcome: Progressing  Goal: Readiness for Transition of Care  Outcome: Progressing     Problem: Sickle Cell Disease  Goal: Optimal Cerebral Tissue Perfusion  Outcome: Progressing  Goal: Optimal Coping with Sickle Cell Disease  Outcome: Progressing  Goal: Effective Tissue Perfusion  Outcome: Progressing  Goal: Absence of Infection Signs and Symptoms  Outcome: Progressing  Goal: Optimal Pain Control and Function  Outcome: Progressing  Goal: Optimal Oxygenation  Outcome: Progressing     Problem: Infection  Goal: Absence of Infection Signs and Symptoms  Outcome: Progressing     Problem: Fall Injury Risk  Goal: Absence of Fall and Fall-Related Injury  Outcome: Progressing     Problem: Pain Chronic (Persistent)  Goal: Optimal Pain Control and Function  Outcome: Progressing

## 2025-01-16 NOTE — NURSING
Critical value received from Sanaz at Lab, Vancomycin 45. Communicated to bedside nurse and primary attending, both acknowledged.

## 2025-01-16 NOTE — PROGRESS NOTES
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 She was recently admitted to [facility] 12/29/24 - 1/5/25 for pain c/w sickle cell pain crises which was refractory to her home regimen of tylenol, oxycodone 15mg, and muscle relaxers. She reported that she moved to the area about 6 weeks ago after living in [place]. She was receiving chronic exchange transfusions there but is not currently receiving them as part of her therapy here. She is seeing a hematologist. She also reports that she previously was on MS contin, pen VK, and hydroxurea as part of her home regimen but has not been put back on those since she has moved here. Of note, during that recent admission, the provider was informed by nursing that the patient asked if she could be given a little more medicine than what was prescribed stating "no one has to know". She also asked if nursing needed some help to throw away an empty flush syringe and an empty med syringe. At that time, she was prescribed Dilaudid 3mg IV q3h PRN and IVF with improvement in her pain.    In the ED, /99 HR 74 RR 20 sats adequate on ambient air. Tmax 100.9. Labs notable for Hgb 9.8 and K 2.9. LA wnl. COVID, Flu, and RSV negative. Blood cultures were obtained. UA was ordered. CXR showed . Received Dilaudid 3mg IV in total, Tylenol 1g, Benadryl 25mg PO, Zofran 4mg " IV, and potassium 25mEq PO.    The patient was admitted to Hospital Medicine for further workup and management.    Overview/Hospital Course:  44-year-old female with sickle cell beta thalassemia zero, multiple pulmonary emboli on chronic AC, asthma, and HTN who was admitted for acute sickle cell pain crisis.Initiated omn oxy/dilaudid/benadryl prn. Blood cultures positive for CONS and due to port ID was consulted and recommended port removal with IV vanc. Recommended course until 1/18/25. Patient wanting new port during hospitalization. Discussed with IR after cultures negative 48 hours and decision made for outpatient port. Wean dilaudid as tolerated    Interval History: reports l shoulder pain.    Review of Systems  Objective:     Vital Signs (Most Recent):  Temp: 97.9 °F (36.6 °C) (01/16/25 1604)  Pulse: 87 (01/16/25 1604)  Resp: 18 (01/16/25 1632)  BP: 126/80 (01/16/25 1604)  SpO2: 97 % (01/16/25 1604) Vital Signs (24h Range):  Temp:  [97.9 °F (36.6 °C)-98.5 °F (36.9 °C)] 97.9 °F (36.6 °C)  Pulse:  [82-95] 87  Resp:  [18-20] 18  SpO2:  [93 %-98 %] 97 %  BP: (117-145)/(65-86) 126/80     Weight: 93.4 kg (206 lb)  Body mass index is 37.68 kg/m².  No intake or output data in the 24 hours ending 01/16/25 1704      Physical Exam  Constitutional:       General: She is not in acute distress.  Cardiovascular:      Rate and Rhythm: Normal rate.      Pulses: Normal pulses.   Pulmonary:      Effort: No respiratory distress.      Breath sounds: Normal breath sounds. No wheezing.   Abdominal:      General: Bowel sounds are normal. There is no distension.      Palpations: Abdomen is soft.      Tenderness: There is no abdominal tenderness.   Musculoskeletal:      Right lower leg: No edema.      Left lower leg: No edema.   Neurological:      Mental Status: She is alert and oriented to person, place, and time. Mental status is at baseline.             Significant Labs: All pertinent labs within the past 24 hours have been  reviewed.    Significant Imaging: I have reviewed all pertinent imaging results/findings within the past 24 hours.      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 She was recently [place] [facility] [place]24 - 1/5/25 for a pain crisis.  CXR without focal consolidation. I am not concerned for acute chest at this time.    Plan  - Multimodal pain regimen: scheduled tylenol, robaxin, and oxycodone PRN   - Dilaudid 2mg IV q3h prn,  - mIVF  - Folic acid, hydroxyurea and other home SCD medications reordered  - repeat CXR 1/11/25 without signs of acute chest, atelectasis present .incentive spirometry     Bacteremia due to Staphylococcus epidermidis  -blood cultures with staph epidermis and another CONS   -due to port placement ID consult placed  -stopped Rocephin and started on Vancomycin   -TTE negative for vegetations   -s/p Port removal with General Surgery 1/12/25  -repeat BC NGTD  -BC ordered 1/12/25  -Cleared by Heme Onc and ID as cultures have been negative  -IR consult placed for port , recommended to be done OP  -planning for Vancomycin with ALYSE 1/18/25          Chronic prescription opiate use  Uses  tylenol, oxycodone 15mg, and muscle relaxers. Pain has been refractory to these due to reported emesis.    Plan  - See Vaso-occlusive pain      Chronic gastritis  Chronic and stable.    Plan  - Continue home PPI and carafate      Chronic anticoagulation  See History of PE      History of pulmonary embolism  Chronic and stable.    Plan  - Continue home Eliquis        Intermittent asthma  Chronic and stable. Not in respiratory distress.    Plan  - Continue home albuterol PRN      Hypertension  Patient's blood  pressure range in the last 24 hours was: BP  Min: 129/83  Max: 159/92.The patient's inpatient anti-hypertensive regimen is listed below:  Current Antihypertensives  amLODIPine tablet 10 mg, Daily, Oral    Plan  - BP is controlled, no changes needed to their regimen      VTE Risk Mitigation (From admission, onward)           Ordered     apixaban tablet 5 mg  2 times daily         01/12/25 1258     IP VTE HIGH RISK PATIENT  Once         01/07/25 2326     Place sequential compression device  Until discontinued         01/07/25 2326                    Discharge Planning   ALYSE: 1/18/2025     Code Status: Full Code   Medical Readiness for Discharge Date:   Discharge Plan A: Home with family   Discharge Delays: None known at this time                    Francoise Luna MD  Department of Hospital Medicine   [facility] S[facility]

## 2025-01-16 NOTE — SUBJECTIVE & OBJECTIVE
Interval History: reports l shoulder pain.    Review of Systems  Objective:     Vital Signs (Most Recent):  Temp: 97.9 °F (36.6 °C) (01/16/25 1604)  Pulse: 87 (01/16/25 1604)  Resp: 18 (01/16/25 1632)  BP: 126/80 (01/16/25 1604)  SpO2: 97 % (01/16/25 1604) Vital Signs (24h Range):  Temp:  [97.9 °F (36.6 °C)-98.5 °F (36.9 °C)] 97.9 °F (36.6 °C)  Pulse:  [82-95] 87  Resp:  [18-20] 18  SpO2:  [93 %-98 %] 97 %  BP: (117-145)/(65-86) 126/80     Weight: 93.4 kg (206 lb)  Body mass index is 37.68 kg/m².  No intake or output data in the 24 hours ending 01/16/25 1704      Physical Exam  Constitutional:       General: She is not in acute distress.  Cardiovascular:      Rate and Rhythm: Normal rate.      Pulses: Normal pulses.   Pulmonary:      Effort: No respiratory distress.      Breath sounds: Normal breath sounds. No wheezing.   Abdominal:      General: Bowel sounds are normal. There is no distension.      Palpations: Abdomen is soft.      Tenderness: There is no abdominal tenderness.   Musculoskeletal:      Right lower leg: No edema.      Left lower leg: No edema.   Neurological:      Mental Status: She is alert and oriented to person, place, and time. Mental status is at baseline.             Significant Labs: All pertinent labs within the past 24 hours have been reviewed.    Significant Imaging: I have reviewed all pertinent imaging results/findings within the past 24 hours.

## 2025-01-17 DIAGNOSIS — D57.00 HB-SS DISEASE WITH VASO-OCCLUSIVE PAIN: ICD-10-CM

## 2025-01-17 DIAGNOSIS — D57.00 SICKLE CELL PAIN CRISIS: Primary | ICD-10-CM

## 2025-01-17 LAB
ALBUMIN SERPL BCP-MCNC: 3.4 G/DL (ref 3.5–5.2)
ALP SERPL-CCNC: 78 U/L (ref 40–150)
ALT SERPL W/O P-5'-P-CCNC: 29 U/L (ref 10–44)
ANION GAP SERPL CALC-SCNC: 8 MMOL/L (ref 8–16)
ANISOCYTOSIS BLD QL SMEAR: ABNORMAL
AST SERPL-CCNC: 40 U/L (ref 10–40)
BACTERIA BLD CULT: NORMAL
BACTERIA BLD CULT: NORMAL
BASOPHILS # BLD AUTO: 0.13 K/UL (ref 0–0.2)
BASOPHILS NFR BLD: 1.2 % (ref 0–1.9)
BILIRUB SERPL-MCNC: 0.2 MG/DL (ref 0.1–1)
BUN SERPL-MCNC: 16 MG/DL (ref 6–20)
CALCIUM SERPL-MCNC: 8.8 MG/DL (ref 8.7–10.5)
CHLORIDE SERPL-SCNC: 105 MMOL/L (ref 95–110)
CO2 SERPL-SCNC: 26 MMOL/L (ref 23–29)
CREAT SERPL-MCNC: 0.9 MG/DL (ref 0.5–1.4)
DACRYOCYTES BLD QL SMEAR: ABNORMAL
DIFFERENTIAL METHOD BLD: ABNORMAL
EOSINOPHIL # BLD AUTO: 0.6 K/UL (ref 0–0.5)
EOSINOPHIL NFR BLD: 5.6 % (ref 0–8)
ERYTHROCYTE [DISTWIDTH] IN BLOOD BY AUTOMATED COUNT: 23.1 % (ref 11.5–14.5)
EST. GFR  (NO RACE VARIABLE): >60 ML/MIN/1.73 M^2
GLUCOSE SERPL-MCNC: 89 MG/DL (ref 70–110)
HCT VFR BLD AUTO: 25 % (ref 37–48.5)
HGB BLD-MCNC: 8.6 G/DL (ref 12–16)
HOWELL-JOLLY BOD BLD QL SMEAR: ABNORMAL
HYPOCHROMIA BLD QL SMEAR: ABNORMAL
IMM GRANULOCYTES # BLD AUTO: 0.09 K/UL (ref 0–0.04)
IMM GRANULOCYTES NFR BLD AUTO: 0.8 % (ref 0–0.5)
LYMPHOCYTES # BLD AUTO: 3.1 K/UL (ref 1–4.8)
LYMPHOCYTES NFR BLD: 29.4 % (ref 18–48)
MAGNESIUM SERPL-MCNC: 1.8 MG/DL (ref 1.6–2.6)
MCH RBC QN AUTO: 22.3 PG (ref 27–31)
MCHC RBC AUTO-ENTMCNC: 34.4 G/DL (ref 32–36)
MCV RBC AUTO: 65 FL (ref 82–98)
MONOCYTES # BLD AUTO: 1.1 K/UL (ref 0.3–1)
MONOCYTES NFR BLD: 10.5 % (ref 4–15)
NEUTROPHILS # BLD AUTO: 5.6 K/UL (ref 1.8–7.7)
NEUTROPHILS NFR BLD: 52.5 % (ref 38–73)
NRBC BLD-RTO: 4 /100 WBC
OVALOCYTES BLD QL SMEAR: ABNORMAL
PHOSPHATE SERPL-MCNC: 4.1 MG/DL (ref 2.7–4.5)
PLATELET # BLD AUTO: 83 K/UL (ref 150–450)
PLATELET BLD QL SMEAR: ABNORMAL
PMV BLD AUTO: ABNORMAL FL (ref 9.2–12.9)
POIKILOCYTOSIS BLD QL SMEAR: ABNORMAL
POLYCHROMASIA BLD QL SMEAR: ABNORMAL
POTASSIUM SERPL-SCNC: 4.2 MMOL/L (ref 3.5–5.1)
PROT SERPL-MCNC: 7.2 G/DL (ref 6–8.4)
RBC # BLD AUTO: 3.86 M/UL (ref 4–5.4)
SICKLE CELLS BLD QL SMEAR: ABNORMAL
SODIUM SERPL-SCNC: 139 MMOL/L (ref 136–145)
TARGETS BLD QL SMEAR: ABNORMAL
WBC # BLD AUTO: 10.6 K/UL (ref 3.9–12.7)

## 2025-01-17 PROCEDURE — 11000001 HC ACUTE MED/SURG PRIVATE ROOM

## 2025-01-17 PROCEDURE — 25000003 PHARM REV CODE 250: Performed by: STUDENT IN AN ORGANIZED HEALTH CARE EDUCATION/TRAINING PROGRAM

## 2025-01-17 PROCEDURE — 63600175 PHARM REV CODE 636 W HCPCS: Performed by: INTERNAL MEDICINE

## 2025-01-17 PROCEDURE — 84100 ASSAY OF PHOSPHORUS: CPT

## 2025-01-17 PROCEDURE — 36415 COLL VENOUS BLD VENIPUNCTURE: CPT

## 2025-01-17 PROCEDURE — 25000003 PHARM REV CODE 250: Performed by: EMERGENCY MEDICINE

## 2025-01-17 PROCEDURE — 85025 COMPLETE CBC W/AUTO DIFF WBC: CPT

## 2025-01-17 PROCEDURE — 83735 ASSAY OF MAGNESIUM: CPT

## 2025-01-17 PROCEDURE — 63600175 PHARM REV CODE 636 W HCPCS: Mod: JZ,TB | Performed by: STUDENT IN AN ORGANIZED HEALTH CARE EDUCATION/TRAINING PROGRAM

## 2025-01-17 PROCEDURE — 80053 COMPREHEN METABOLIC PANEL: CPT

## 2025-01-17 PROCEDURE — 25000003 PHARM REV CODE 250

## 2025-01-17 RX ORDER — DIPHENHYDRAMINE HYDROCHLORIDE 50 MG/ML
25 INJECTION INTRAMUSCULAR; INTRAVENOUS
Status: DISCONTINUED | OUTPATIENT
Start: 2025-01-17 | End: 2025-01-22

## 2025-01-17 RX ADMIN — HYDROXYUREA 1000 MG: 500 CAPSULE ORAL at 08:01

## 2025-01-17 RX ADMIN — GABAPENTIN 600 MG: 300 CAPSULE ORAL at 08:01

## 2025-01-17 RX ADMIN — APIXABAN 5 MG: 5 TABLET, FILM COATED ORAL at 08:01

## 2025-01-17 RX ADMIN — HYDROMORPHONE HYDROCHLORIDE 2 MG: 1 INJECTION, SOLUTION INTRAMUSCULAR; INTRAVENOUS; SUBCUTANEOUS at 05:01

## 2025-01-17 RX ADMIN — ACETAMINOPHEN 975 MG: 325 TABLET ORAL at 08:01

## 2025-01-17 RX ADMIN — HYDROMORPHONE HYDROCHLORIDE 2 MG: 1 INJECTION, SOLUTION INTRAMUSCULAR; INTRAVENOUS; SUBCUTANEOUS at 08:01

## 2025-01-17 RX ADMIN — SUCRALFATE 1 G: 1 TABLET ORAL at 04:01

## 2025-01-17 RX ADMIN — HYDROMORPHONE HYDROCHLORIDE 2 MG: 1 INJECTION, SOLUTION INTRAMUSCULAR; INTRAVENOUS; SUBCUTANEOUS at 01:01

## 2025-01-17 RX ADMIN — POLYETHYLENE GLYCOL 3350 17 G: 17 POWDER, FOR SOLUTION ORAL at 10:01

## 2025-01-17 RX ADMIN — DIPHENHYDRAMINE HYDROCHLORIDE 25 MG: 50 INJECTION, SOLUTION INTRAMUSCULAR; INTRAVENOUS at 05:01

## 2025-01-17 RX ADMIN — VANCOMYCIN HYDROCHLORIDE 1000 MG: 1 INJECTION, POWDER, LYOPHILIZED, FOR SOLUTION INTRAVENOUS at 09:01

## 2025-01-17 RX ADMIN — DIPHENHYDRAMINE HYDROCHLORIDE 25 MG: 50 INJECTION, SOLUTION INTRAMUSCULAR; INTRAVENOUS at 01:01

## 2025-01-17 RX ADMIN — METHOCARBAMOL 1000 MG: 500 TABLET ORAL at 08:01

## 2025-01-17 RX ADMIN — METHOCARBAMOL 1000 MG: 500 TABLET ORAL at 04:01

## 2025-01-17 RX ADMIN — Medication 6 MG: at 08:01

## 2025-01-17 RX ADMIN — DIPHENHYDRAMINE HYDROCHLORIDE 25 MG: 50 INJECTION, SOLUTION INTRAMUSCULAR; INTRAVENOUS at 10:01

## 2025-01-17 RX ADMIN — PANTOPRAZOLE SODIUM 40 MG: 40 TABLET, DELAYED RELEASE ORAL at 08:01

## 2025-01-17 RX ADMIN — SUCRALFATE 1 G: 1 TABLET ORAL at 05:01

## 2025-01-17 RX ADMIN — SUCRALFATE 1 G: 1 TABLET ORAL at 10:01

## 2025-01-17 RX ADMIN — OXYCODONE HYDROCHLORIDE 15 MG: 10 TABLET ORAL at 10:01

## 2025-01-17 RX ADMIN — DIPHENHYDRAMINE HYDROCHLORIDE 25 MG: 50 INJECTION, SOLUTION INTRAMUSCULAR; INTRAVENOUS at 08:01

## 2025-01-17 RX ADMIN — OXYCODONE HYDROCHLORIDE 15 MG: 10 TABLET ORAL at 04:01

## 2025-01-17 RX ADMIN — AMLODIPINE BESYLATE 10 MG: 10 TABLET ORAL at 08:01

## 2025-01-17 RX ADMIN — VANCOMYCIN HYDROCHLORIDE 1000 MG: 1 INJECTION, POWDER, LYOPHILIZED, FOR SOLUTION INTRAVENOUS at 08:01

## 2025-01-17 RX ADMIN — OXYCODONE HYDROCHLORIDE 15 MG: 10 TABLET ORAL at 08:01

## 2025-01-17 RX ADMIN — SENNOSIDES AND DOCUSATE SODIUM 1 TABLET: 50; 8.6 TABLET ORAL at 08:01

## 2025-01-17 RX ADMIN — OXYCODONE HYDROCHLORIDE 15 MG: 10 TABLET ORAL at 12:01

## 2025-01-17 RX ADMIN — GABAPENTIN 600 MG: 300 CAPSULE ORAL at 04:01

## 2025-01-17 RX ADMIN — SUCRALFATE 1 G: 1 TABLET ORAL at 08:01

## 2025-01-17 RX ADMIN — FOLIC ACID 1 MG: 1 TABLET ORAL at 08:01

## 2025-01-17 RX ADMIN — HYDROMORPHONE HYDROCHLORIDE 2 MG: 1 INJECTION, SOLUTION INTRAMUSCULAR; INTRAVENOUS; SUBCUTANEOUS at 10:01

## 2025-01-17 NOTE — PLAN OF CARE
Recommendations     1. Continue regular diet as tolerated   2. Encourage good intake   3. RD to monitor weight, labs, PO intake     Goals: % nutritional needs met with diet  Nutrition Goal Status: new  Communication of RD Recs: other (comment) (POC)

## 2025-01-17 NOTE — SUBJECTIVE & OBJECTIVE
Interval History: Patient reports left arm pain plan to wean dilaudid in am    Review of Systems  Objective:     Vital Signs (Most Recent):  Temp: 98.1 °F (36.7 °C) (01/17/25 0810)  Pulse: 86 (01/17/25 0810)  Resp: 15 (01/17/25 1014)  BP: 139/82 (01/17/25 0810)  SpO2: 98 % (01/17/25 0810) Vital Signs (24h Range):  Temp:  [97.9 °F (36.6 °C)-98.8 °F (37.1 °C)] 98.1 °F (36.7 °C)  Pulse:  [] 86  Resp:  [15-22] 15  SpO2:  [95 %-98 %] 98 %  BP: (121-151)/(77-84) 139/82     Weight: 93.4 kg (205 lb 14.6 oz)  Body mass index is 37.66 kg/m².  No intake or output data in the 24 hours ending 01/17/25 1146      Physical Exam  Constitutional:       General: She is not in acute distress.     Appearance: She is obese.   Cardiovascular:      Rate and Rhythm: Normal rate.      Pulses: Normal pulses.   Pulmonary:      Effort: No respiratory distress.      Breath sounds: Normal breath sounds. No wheezing.   Abdominal:      General: Bowel sounds are normal. There is no distension.      Palpations: Abdomen is soft.      Tenderness: There is no abdominal tenderness.   Musculoskeletal:      Right lower leg: No edema.      Left lower leg: No edema.   Skin:     General: Skin is warm.   Neurological:      Mental Status: She is alert and oriented to person, place, and time. Mental status is at baseline.   Psychiatric:         Mood and Affect: Mood normal.             Significant Labs: All pertinent labs within the past 24 hours have been reviewed.    Significant Imaging: I have reviewed all pertinent imaging results/findings within the past 24 hours.

## 2025-01-17 NOTE — PROGRESS NOTES
"[facility]  Adult Nutrition  Progress Note    SUMMARY       Recommendations    1. Continue regular diet as tolerated   2. Encourage good intake   3. RD to monitor weight, labs, PO intake    Goals: % nutritional needs met with diet  Nutrition Goal Status: new  Communication of RD Recs: other (comment) (POC)    Assessment and Plan    No nutrition diagnosis at this time.     Reason for Assessment    Reason For Assessment: length of stay (day 9)  Diagnosis: other (see comments) (vaso-occlusive pain due to sickle cell disease)  General Information Comments: Pt admitted with vaso-occlusive pain due to sickle cell disease. PMHx: HTN, asthma, sickle cell-beta thalassemia disease with pain, opioid dependence, pulmonary embolism. Pt reported a fair appetite - PO: 50-75%. Pt stated being sick causes a decrease in appetite. Pt denied chewing and swallowing difficulties - denied nausea, vomiting, constipation, and diarrhea. BM: 1/15.  Nutrition Discharge Planning: general healthy diet    Nutrition/Diet History    Spiritual, Cultural Beliefs, Gnosticist Practices, Values that Affect Care: no  Food Allergies: NKFA  Factors Affecting Nutritional Intake: decreased appetite    Nutrition Related Social Determinants of Health: SDOH: Adequate food in home environment    Food Insecurity: No Food Insecurity (1/8/2025)    Hunger Vital Sign     Worried About Running Out of Food in the Last Year: Never true     Ran Out of Food in the Last Year: Never true   Recent Concern: Food Insecurity - Food Insecurity Present (12/11/2024)    Hunger Vital Sign     Worried About Running Out of Food in the Last Year: Often true     Ran Out of Food in the Last Year: Often true     Anthropometrics    Height: 5' 2" (157.5 cm)  Height (inches): 62 in  Height Method: Stated  Weight: 93.4 kg (205 lb 14.6 oz)  Weight (lb): 205.91 lb  Weight Method: Bed Scale  Ideal Body Weight (IBW), Female: 110 lb  % Ideal Body Weight, Female (lb): 187.19 %  BMI " (Calculated): 37.7  BMI Grade: 35 - 39.9 - obesity - grade II  Usual Body Weight (UBW), k.9 kg  % Usual Body Weight: 102.97  % Weight Change From Usual Weight: 2.75 %     Lab/Procedures/Meds    Pertinent Labs Reviewed: reviewed  Pertinent Labs Comments: RBC 3.80 low, H/H 8.3/24.8 low, albumin 3.3 low  Pertinent Medications Reviewed: reviewed  Pertinent Medications Comments: amlodipine, apixaban, folic acid, gabapentin, hydroxyurea, methocarbamol, pantoprazole, senna    Estimated/Assessed Needs    Weight Used For Calorie Calculations: 93.4 kg (205 lb 14.6 oz)  Energy Calorie Requirements (kcal): 1527 kcal (NCM)  Energy Need Method: Potter-St Jeor (no AF)  Protein Requirements: 40-50 g/pro (0.8-1.0 g/kg) (NCM)  Weight Used For Protein Calculations: 49.9 kg (110 lb) (IBW)        RDA Method (mL): 1527     Nutrition Prescription Ordered    Current Diet Order: regular    Evaluation of Received Nutrient/Fluid Intake    Energy Calories Required: meeting needs  Protein Required: meeting needs  Tolerance: tolerating  % Intake of Estimated Energy Needs: 75 - 100 %  % Meal Intake: 50 - 75 %    Nutrition Risk    Level of Risk/Frequency of Follow-up: low     Monitor and Evaluation    Food and Nutrient Intake: energy intake, food and beverage intake  Food and Nutrient Adminstration: diet order  Knowledge/Beliefs/Attitudes: beliefs and attitudes, food and nutrition knowledge/skill  Physical Activity and Function: nutrition-related ADLs and IADLs, factors affecting access to physical activity  Anthropometric Measurements: body mass index, weight change, weight  Biochemical Data, Medical Tests and Procedures: lipid profile, inflammatory profile, glucose/endocrine profile, gastrointestinal profile, electrolyte and renal panel  Nutrition-Focused Physical Findings: overall appearance     Nutrition Follow-Up    RD Follow-up?: Yes

## 2025-01-17 NOTE — TELEPHONE ENCOUNTER
----- Message from Violette sent at 1/17/2025  3:43 PM CST -----  Regarding: Refill  Contact: Pt  [phone]            Rx Name:  oxyCODONE immediate release tablet 15 mg       Preferred Pharmacy with number:   [facility]  [address]  [facility] [address]  Phone: [phone] Fax: [phone]      Ordering Provider:  Jarred Clark MD      Contact preference:  [phone]    Comments/Notes:  Requesting a refill when she get discharged thinks it will be this  weekend states she will refuse medication from discharging provider to avoid issues with insurance coving partial prescription  prefers to get from you

## 2025-01-17 NOTE — PROGRESS NOTES
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 She was recently admitted to [facility] 12/29/24 - 1/5/25 for pain c/w sickle cell pain crises which was refractory to her home regimen of tylenol, oxycodone 15mg, and muscle relaxers. She reported that she moved to the area about 6 weeks ago after living in [place]. She was receiving chronic exchange transfusions there but is not currently receiving them as part of her therapy here. She is seeing a hematologist. She also reports that she previously was on MS contin, pen VK, and hydroxurea as part of her home regimen but has not been put back on those since she has moved here. Of note, during that recent admission, the provider was informed by nursing that the patient asked if she could be given a little more medicine than what was prescribed stating "no one has to know". She also asked if nursing needed some help to throw away an empty flush syringe and an empty med syringe. At that time, she was prescribed Dilaudid 3mg IV q3h PRN and IVF with improvement in her pain.    In the ED, /99 HR 74 RR 20 sats adequate on ambient air. Tmax 100.9. Labs notable for Hgb 9.8 and K 2.9. LA wnl. COVID, Flu, and RSV negative. Blood cultures were obtained. UA was ordered. CXR showed . Received Dilaudid 3mg IV in total, Tylenol 1g, Benadryl 25mg PO, Zofran 4mg " IV, and potassium 25mEq PO.    The patient was admitted to Hospital Medicine for further workup and management.    Overview/Hospital Course:  44-year-old female with sickle cell beta thalassemia zero, multiple pulmonary emboli on chronic AC, asthma, and HTN who was admitted for acute sickle cell pain crisis.Initiated on oxy/dilaudid/benadryl prn. Blood cultures positive for CONS and due to port ID was consulted and recommended port removal with IV vanc. Recommended course until 1/19/25. Patient wanting new port during hospitalization. Discussed with IR after cultures negative at 48 hours and decision made for outpatient port. Wean dilaudid as tolerated    Interval History: Patient reports left arm pain plan to wean dilaudid in am    Review of Systems  Objective:     Vital Signs (Most Recent):  Temp: 98.1 °F (36.7 °C) (01/17/25 0810)  Pulse: 86 (01/17/25 0810)  Resp: 15 (01/17/25 1014)  BP: 139/82 (01/17/25 0810)  SpO2: 98 % (01/17/25 0810) Vital Signs (24h Range):  Temp:  [97.9 °F (36.6 °C)-98.8 °F (37.1 °C)] 98.1 °F (36.7 °C)  Pulse:  [] 86  Resp:  [15-22] 15  SpO2:  [95 %-98 %] 98 %  BP: (121-151)/(77-84) 139/82     Weight: 93.4 kg (205 lb 14.6 oz)  Body mass index is 37.66 kg/m².  No intake or output data in the 24 hours ending 01/17/25 1146      Physical Exam  Constitutional:       General: She is not in acute distress.     Appearance: She is obese.   Cardiovascular:      Rate and Rhythm: Normal rate.      Pulses: Normal pulses.   Pulmonary:      Effort: No respiratory distress.      Breath sounds: Normal breath sounds. No wheezing.   Abdominal:      General: Bowel sounds are normal. There is no distension.      Palpations: Abdomen is soft.      Tenderness: There is no abdominal tenderness.   Musculoskeletal:      Right lower leg: No edema.      Left lower leg: No edema.   Skin:     General: Skin is warm.   Neurological:      Mental Status: She is alert and oriented to person, place, and time. Mental  status is at baseline.   Psychiatric:         Mood and Affect: Mood normal.             Significant Labs: All pertinent labs within the past 24 hours have been reviewed.    Significant Imaging: I have reviewed all pertinent imaging results/findings within the past 24 hours.      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 She was recently admitted to [facility] 12/29/24 - 1/5/25 for a pain crisis.  CXR without focal consolidation. I am not concerned for acute chest at this time.    Plan  - Multimodal pain regimen: scheduled tylenol, robaxin, and oxycodone PRN   - Dilaudid 2mg IV q3h prn,  - mIVF  - Folic acid, hydroxyurea and other home SCD medications reordered  - repeat CXR 1/11/25 without signs of acute chest, atelectasis present .incentive spirometry     Bacteremia due to Staphylococcus epidermidis  -blood cultures with staph epidermis and another CONS   -due to port placement ID consult placed  -stopped Rocephin and started on Vancomycin   -TTE negative for vegetations   -s/p Port removal with General Surgery 1/12/25  -repeat BC NGTD  -Cleared by Heme Onc and ID as cultures have been negative  -IR consult placed for port , recommended to be done OP  -planning for Vancomycin with ALYSE 1/19/25          Chronic prescription opiate use  Uses  tylenol, oxycodone 15mg, and muscle relaxers. Pain has been refractory to these due to reported emesis.    Plan  - See Vaso-occlusive pain      Chronic gastritis  Chronic and stable.    Plan  - Continue home PPI and carafate      Chronic anticoagulation  See History of PE      History of pulmonary embolism  Chronic and stable.    Plan  - Continue home  Eliquis        Intermittent asthma  Chronic and stable. Not in respiratory distress.    Plan  - Continue home albuterol PRN      Hypertension  Patient's blood pressure range in the last 24 hours was: BP  Min: 129/83  Max: 159/92.The patient's inpatient anti-hypertensive regimen is listed below:  Current Antihypertensives  amLODIPine tablet 10 mg, Daily, Oral    Plan  - BP is controlled, no changes needed to their regimen      VTE Risk Mitigation (From admission, onward)           Ordered     apixaban tablet 5 mg  2 times daily         01/12/25 1258     IP VTE HIGH RISK PATIENT  Once         01/07/25 2326     Place sequential compression device  Until discontinued         01/07/25 2326                    Discharge Planning   ALYSE: 1/18/2025     Code Status: Full Code   Medical Readiness for Discharge Date:   Discharge Plan A: Home with family   Discharge Delays: None known at this time                    Francoise Luna MD  Department of Hospital Medicine   [facility]

## 2025-01-17 NOTE — TELEPHONE ENCOUNTER
----- Message from Shiraz sent at 1/17/2025  9:11 AM CST -----  Regarding: Rx refill  Contact: [phone]  RX Name: oxyCODONE immediate release tablet 15 mg     How is it taken: Rvery 4 hours PRN     Quantity:  15 mg       Preferred Pharmacy with phone number: [facility]   Phone: [phone]  Fax: [phone]        Ordering Provider: Dr. Clark       Contact Preference:  [phone]     Addl info:      RX Name: morphine (MS CONTIN) 30 MG 12 hr tablet     How is it taken: Take 1 tablet (30 mg total) by mouth every 12 (twelve) hours. for 7 days - Oral     Quantity: N/A       Preferred Pharmacy with phone number: [facility]   Phone: [phone]  Fax: [phone]        Ordering Provider:Dr. Clark       Contact Preference: [phone]     Addl info:      RX Name:hydroxyurea (HYDREA) 500 mg Cap     How is it taken:  Take 2 capsules (1,000 mg total) by mouth once daily. - Oral     Quantity:  60 capsule       Preferred Pharmacy with phone number: [facility]   Phone: [phone]  Fax: [phone]        Ordering Provider: Dr. Clark       Contact Preference:  [phone]     Addl info:      RX Name: gabapentin (NEURONTIN) 300 MG capsule     How is it taken: Take 2 capsules (600 mg total) by mouth 3 (three) times daily. - Oral     Quantity:  180 capsule        Preferred Pharmacy with phone number: [facility]   Phone: [phone]  Fax: [phone]           Ordering Provider:Dr. Clark       Contact Preference: [phone]     Addl info:      RX Name:tiZANidine (ZANAFLEX) 2 MG tablet     How is it taken: Take 2 tablets (4 mg total) by mouth every 8 (eight) hours as needed. - Oral     Quantity: 21 tablet       Preferred Pharmacy with phone number: [facility]   Phone: [phone]  Fax: [phone]        Ordering Provider: Dr. Clark       Contact Preference: [phone]     Addl info:

## 2025-01-17 NOTE — PLAN OF CARE
Problem: Adult Inpatient Plan of Care  Goal: Plan of Care Review  Outcome: Progressing  Goal: Patient-Specific Goal (Individualized)  Outcome: Progressing  Goal: Absence of Hospital-Acquired Illness or Injury  Outcome: Progressing  Goal: Optimal Comfort and Wellbeing  Outcome: Progressing  Goal: Readiness for Transition of Care  Outcome: Progressing     Problem: Sickle Cell Disease  Goal: Optimal Cerebral Tissue Perfusion  Outcome: Progressing  Goal: Optimal Coping with Sickle Cell Disease  Outcome: Progressing  Goal: Effective Tissue Perfusion  Outcome: Progressing  Goal: Absence of Infection Signs and Symptoms  Outcome: Progressing  Goal: Optimal Pain Control and Function  Outcome: Progressing  Goal: Optimal Oxygenation  Outcome: Progressing     Problem: Infection  Goal: Absence of Infection Signs and Symptoms  Outcome: Progressing     Problem: Fall Injury Risk  Goal: Absence of Fall and Fall-Related Injury  Outcome: Progressing     Problem: Pain Chronic (Persistent)  Goal: Optimal Pain Control and Function  Outcome: Progressing   Patient has been having consistent pain at a level of 10 for the last several hours of my shift. Messages the doctor on the issue and dilaudid was modified from q4h to q3h due to patient pain level. Patient is still receiving pain medication around the clock. Patient has no other health changes.

## 2025-01-17 NOTE — PROGRESS NOTES
Pharmacokinetic Assessment Follow Up: IV Vancomycin    Vancomycin serum concentration assessment(s):    The trough level was drawn correctly and can be used to guide therapy at this time. The measurement is above the desired definitive target range of 15 to 20 mcg/mL.    Vancomycin Regimen Plan:    Patient was previously on regimen of 1250mg IV every 12 hours. This regimen was discontinued due to the trough being drawn incorrectly. Trough was drawn correctly approximately 11.5 hours post-vancomycin dose and was supra-therapeutic.     Start vancomycin 1000mg IV every 12 hours with next serum trough concentration measured at 0700 on 1/18    Drug levels (last 3 results):  Recent Labs   Lab Result Units 01/14/25  0421 01/16/25  0634 01/16/25  1723   Vancomycin-Trough ug/mL 17.0 45.0* 22.0       Pharmacy will continue to follow and monitor vancomycin.    Please contact pharmacy at extension [phone] for questions regarding this assessment.    Thank you for the consult,   Evelyn Bacon       Patient brief summary:  Nazanin Malone is a 44 y.o. female initiated on antimicrobial therapy with IV Vancomycin for treatment of bacteremia    The patient's current regimen is 1000mg IV every 12 hours    Drug Allergies:   Review of patient's allergies indicates:   Allergen Reactions    Cat dander Anaphylaxis, Itching and Shortness Of Breath    Nsaids (non-steroidal anti-inflammatory drug) Itching and Anaphylaxis    Latex Rash       Actual Body Weight:   93.4kg    Renal Function:   Estimated Creatinine Clearance: 83.1 mL/min (based on SCr of 0.9 mg/dL).,     Dialysis Method (if applicable):  N/A    CBC (last 72 hours):  Recent Labs   Lab Result Units 01/14/25  0421 01/15/25  0414 01/15/25  0855 01/16/25  0634   WBC K/uL 5.92 6.41 5.86 6.78   Hemoglobin g/dL 7.6* 7.5* 8.2* 8.3*   Hematocrit % 23.1* 22.9* 24.7* 24.8*   Platelets K/uL 183 147* 137* 122*   Gran % % 15.1* 19.0* 17.2* 34.0*   Lymph % % 64.7* 60.2* 58.7* 46.5   Mono % % 12.3  12.0 14.7 11.4   Eosinophil % % 7.1 7.5 8.2* 6.8   Basophil % % 0.8 1.1 1.0 1.0   Differential Method  Automated Automated Automated Automated       Metabolic Panel (last 72 hours):  Recent Labs   Lab Result Units 01/14/25  0421 01/15/25  0855 01/16/25  0634   Sodium mmol/L 140 140 138   Potassium mmol/L 3.2* 3.6 4.1   Chloride mmol/L 108 107 106   CO2 mmol/L 24 25 26   Glucose mg/dL 147* 85 82   BUN mg/dL 13 17 19   Creatinine mg/dL 0.9 1.0 0.9   Albumin g/dL 3.2* 3.3* 3.3*   Total Bilirubin mg/dL 0.2 0.2 0.2   Alkaline Phosphatase U/L 74 75 75   AST U/L 16 21 31   ALT U/L 13 16 22   Magnesium mg/dL 1.7 1.7 1.8   Phosphorus mg/dL 2.8 2.6* 4.1       Vancomycin Administrations:  vancomycin given in the last 96 hours                     vancomycin 1,250 mg in 0.9% NaCl 250 mL IVPB (admixture device) (mg) 1,250 mg New Bag 01/16/25 0558     1,250 mg New Bag 01/15/25 1756     1,250 mg New Bag  0508     1,250 mg New Bag 01/14/25 1742     1,250 mg New Bag  0716     1,250 mg New Bag 01/13/25 1814      Restarted  1809     1,250 mg New Bag  0328                    Microbiologic Results:  Microbiology Results (last 7 days)       Procedure Component Value Units Date/Time    Blood culture [[id number]] Collected: 01/12/25 1344    Order Status: Completed Specimen: Blood from Peripheral, Wrist, Right Updated: 01/16/25 1422     Blood Culture, Routine No Growth to date      No Growth to date      No Growth to date      No Growth to date      No Growth to date    Narrative:      Please get each set from 2 different locations, thank you  Collection has been rescheduled by JOT at 01/12/2025 11:04 Reason:   Doctor requested to hold off going to add more labs   Collection has been rescheduled by JOT at 01/12/2025 11:04 Reason:   Doctor requested to hold off going to add more labs     Blood culture [[id number]] Collected: 01/12/25 1328    Order Status: Completed Specimen: Blood from Peripheral, Hand, Left Updated: 01/16/25 1422     Blood  Culture, Routine No Growth to date      No Growth to date      No Growth to date      No Growth to date      No Growth to date    Narrative:      Collection has been rescheduled by JOT at 01/12/2025 11:04 Reason:   Doctor requested to hold off going to add more labs   Collection has been rescheduled by JOT at 01/12/2025 11:04 Reason:   Doctor requested to hold off going to add more labs     Blood culture [[id number]] Collected: 01/09/25 0533    Order Status: Completed Specimen: Blood from Peripheral, Antecubital, Right Updated: 01/14/25 0812     Blood Culture, Routine No growth after 5 days.    Blood culture [[id number]] Collected: 01/09/25 0533    Order Status: Completed Specimen: Blood from Peripheral, Lower Arm, Right Updated: 01/14/25 0812     Blood Culture, Routine No growth after 5 days.    Blood culture #1 **CANNOT BE ORDERED STAT** [[id number]]  (Abnormal)  (Susceptibility) Collected: 01/07/25 1934    Order Status: Completed Specimen: Blood from Peripheral, Hand, Right Updated: 01/13/25 1014     Blood Culture, Routine Gram stain aer bottle: Gram positive cocci in clusters resembling Staph      Results called to and read back by: Rachel BLANCO 01/09/2025  04:21      Gram stain kimberly bottle: Gram positive cocci in clusters resembling Staph      Positive results previously called 01/09/2025  15:07      STAPHYLOCOCCUS CAPITIS    Blood culture #2 **CANNOT BE ORDERED STAT** [[id number]]  (Abnormal)  (Susceptibility) Collected: 01/07/25 1934    Order Status: Completed Specimen: Blood from Peripheral, Hand, Left Updated: 01/13/25 1014     Blood Culture, Routine Gram stain aer bottle: Gram positive cocci in clusters resembling Staph      Results called to and read back by:Belia Moses RN 01/08/2025      21:02      Gram stain kimberly bottle: Gram positive cocci in clusters resembling Staph      Positive results previously called 01/09/2025  15:06      COAGULASE-NEGATIVE STAPHYLOCOCCUS SPECIES  Organism is a probable  contaminant        STAPHYLOCOCCUS CAPITIS

## 2025-01-17 NOTE — ASSESSMENT & PLAN NOTE
-blood cultures with staph epidermis and another CONS   -due to port placement ID consult placed  -stopped Rocephin and started on Vancomycin   -TTE negative for vegetations   -s/p Port removal with General Surgery 1/12/25  -repeat BC NGTD  -Cleared by Heme Onc and ID as cultures have been negative  -IR consult placed for port , recommended to be done OP  -planning for Vancomycin with ALYSE 1/19/25

## 2025-01-18 LAB — VANCOMYCIN TROUGH SERPL-MCNC: 21.6 UG/ML (ref 10–22)

## 2025-01-18 PROCEDURE — 25000003 PHARM REV CODE 250: Performed by: EMERGENCY MEDICINE

## 2025-01-18 PROCEDURE — 63600175 PHARM REV CODE 636 W HCPCS: Performed by: STUDENT IN AN ORGANIZED HEALTH CARE EDUCATION/TRAINING PROGRAM

## 2025-01-18 PROCEDURE — 25000003 PHARM REV CODE 250

## 2025-01-18 PROCEDURE — 25000003 PHARM REV CODE 250: Performed by: STUDENT IN AN ORGANIZED HEALTH CARE EDUCATION/TRAINING PROGRAM

## 2025-01-18 PROCEDURE — 80202 ASSAY OF VANCOMYCIN: CPT | Performed by: STUDENT IN AN ORGANIZED HEALTH CARE EDUCATION/TRAINING PROGRAM

## 2025-01-18 PROCEDURE — 11000001 HC ACUTE MED/SURG PRIVATE ROOM

## 2025-01-18 PROCEDURE — 36415 COLL VENOUS BLD VENIPUNCTURE: CPT | Performed by: STUDENT IN AN ORGANIZED HEALTH CARE EDUCATION/TRAINING PROGRAM

## 2025-01-18 RX ADMIN — DIPHENHYDRAMINE HYDROCHLORIDE 25 MG: 50 INJECTION, SOLUTION INTRAMUSCULAR; INTRAVENOUS at 07:01

## 2025-01-18 RX ADMIN — AMLODIPINE BESYLATE 10 MG: 10 TABLET ORAL at 08:01

## 2025-01-18 RX ADMIN — GABAPENTIN 600 MG: 300 CAPSULE ORAL at 08:01

## 2025-01-18 RX ADMIN — OXYCODONE HYDROCHLORIDE 15 MG: 10 TABLET ORAL at 09:01

## 2025-01-18 RX ADMIN — GABAPENTIN 600 MG: 300 CAPSULE ORAL at 02:01

## 2025-01-18 RX ADMIN — APIXABAN 5 MG: 5 TABLET, FILM COATED ORAL at 08:01

## 2025-01-18 RX ADMIN — HYDROMORPHONE HYDROCHLORIDE 2 MG: 1 INJECTION, SOLUTION INTRAMUSCULAR; INTRAVENOUS; SUBCUTANEOUS at 08:01

## 2025-01-18 RX ADMIN — HYDROMORPHONE HYDROCHLORIDE 2 MG: 1 INJECTION, SOLUTION INTRAMUSCULAR; INTRAVENOUS; SUBCUTANEOUS at 12:01

## 2025-01-18 RX ADMIN — ACETAMINOPHEN 975 MG: 325 TABLET ORAL at 02:01

## 2025-01-18 RX ADMIN — POLYETHYLENE GLYCOL 3350 17 G: 17 POWDER, FOR SOLUTION ORAL at 08:01

## 2025-01-18 RX ADMIN — FOLIC ACID 1 MG: 1 TABLET ORAL at 08:01

## 2025-01-18 RX ADMIN — ACETAMINOPHEN 975 MG: 325 TABLET ORAL at 08:01

## 2025-01-18 RX ADMIN — OXYCODONE HYDROCHLORIDE 15 MG: 10 TABLET ORAL at 05:01

## 2025-01-18 RX ADMIN — DIPHENHYDRAMINE HYDROCHLORIDE 25 MG: 50 INJECTION, SOLUTION INTRAMUSCULAR; INTRAVENOUS at 12:01

## 2025-01-18 RX ADMIN — OXYCODONE HYDROCHLORIDE 15 MG: 10 TABLET ORAL at 10:01

## 2025-01-18 RX ADMIN — HYDROMORPHONE HYDROCHLORIDE 2 MG: 1 INJECTION, SOLUTION INTRAMUSCULAR; INTRAVENOUS; SUBCUTANEOUS at 10:01

## 2025-01-18 RX ADMIN — METHOCARBAMOL 1000 MG: 500 TABLET ORAL at 02:01

## 2025-01-18 RX ADMIN — DIPHENHYDRAMINE HYDROCHLORIDE 25 MG: 50 INJECTION, SOLUTION INTRAMUSCULAR; INTRAVENOUS at 10:01

## 2025-01-18 RX ADMIN — PANTOPRAZOLE SODIUM 40 MG: 40 TABLET, DELAYED RELEASE ORAL at 08:01

## 2025-01-18 RX ADMIN — HYDROMORPHONE HYDROCHLORIDE 2 MG: 1 INJECTION, SOLUTION INTRAMUSCULAR; INTRAVENOUS; SUBCUTANEOUS at 11:01

## 2025-01-18 RX ADMIN — HYDROMORPHONE HYDROCHLORIDE 2 MG: 1 INJECTION, SOLUTION INTRAMUSCULAR; INTRAVENOUS; SUBCUTANEOUS at 07:01

## 2025-01-18 RX ADMIN — Medication 6 MG: at 08:01

## 2025-01-18 RX ADMIN — VANCOMYCIN HYDROCHLORIDE 1000 MG: 1 INJECTION, POWDER, LYOPHILIZED, FOR SOLUTION INTRAVENOUS at 08:01

## 2025-01-18 RX ADMIN — METHOCARBAMOL 1000 MG: 500 TABLET ORAL at 10:01

## 2025-01-18 RX ADMIN — SENNOSIDES AND DOCUSATE SODIUM 1 TABLET: 50; 8.6 TABLET ORAL at 08:01

## 2025-01-18 RX ADMIN — SUCRALFATE 1 G: 1 TABLET ORAL at 08:01

## 2025-01-18 RX ADMIN — HYDROMORPHONE HYDROCHLORIDE 2 MG: 1 INJECTION, SOLUTION INTRAMUSCULAR; INTRAVENOUS; SUBCUTANEOUS at 02:01

## 2025-01-18 RX ADMIN — HYDROXYUREA 1000 MG: 500 CAPSULE ORAL at 08:01

## 2025-01-18 RX ADMIN — OXYCODONE HYDROCHLORIDE 15 MG: 10 TABLET ORAL at 01:01

## 2025-01-18 RX ADMIN — SUCRALFATE 1 G: 1 TABLET ORAL at 05:01

## 2025-01-18 RX ADMIN — METHOCARBAMOL 1000 MG: 500 TABLET ORAL at 08:01

## 2025-01-18 RX ADMIN — DIPHENHYDRAMINE HYDROCHLORIDE 25 MG: 50 INJECTION, SOLUTION INTRAMUSCULAR; INTRAVENOUS at 02:01

## 2025-01-18 RX ADMIN — DIPHENHYDRAMINE HYDROCHLORIDE 25 MG: 50 INJECTION, SOLUTION INTRAMUSCULAR; INTRAVENOUS at 08:01

## 2025-01-18 RX ADMIN — DIPHENHYDRAMINE HYDROCHLORIDE 25 MG: 50 INJECTION, SOLUTION INTRAMUSCULAR; INTRAVENOUS at 03:01

## 2025-01-18 RX ADMIN — SUCRALFATE 1 G: 1 TABLET ORAL at 10:01

## 2025-01-18 RX ADMIN — HYDROMORPHONE HYDROCHLORIDE 2 MG: 1 INJECTION, SOLUTION INTRAMUSCULAR; INTRAVENOUS; SUBCUTANEOUS at 03:01

## 2025-01-18 RX ADMIN — DIPHENHYDRAMINE HYDROCHLORIDE 25 MG: 50 INJECTION, SOLUTION INTRAMUSCULAR; INTRAVENOUS at 11:01

## 2025-01-18 RX ADMIN — VANCOMYCIN HYDROCHLORIDE 750 MG: 750 INJECTION, POWDER, LYOPHILIZED, FOR SOLUTION INTRAVENOUS at 10:01

## 2025-01-18 NOTE — PROGRESS NOTES
"[facility] Medicine  Progress Note    Patient Name: Nazanin Maloen  MRN: [id number]  Patient Class: IP- Inpatient   Admission Date: 1/7/2025  Length of Stay: 10 days  Attending Physician: Francoise Luna,*  Primary Care Provider: Kezia, Primary Doctor        Subjective     Principal Problem:Vaso-occlusive pain due to sickle cell disease        HPI:  Nazanin Malone is a 44-year-old female with sickle cell beta thalassemia zero, multiple pulmonary emboli on chronic AC, asthma, and HTN who presented to [facility] [place] ED 1/4/25 for diffuse myalgias and pain consistent with a sickle cell pain crisis as well as fever, nausea, vomiting, and diarrhea for the past day. She was recently admitted to [facility] [place] 12/29/24 - 1/5/25 for pain c/w sickle cell pain crises which was refractory to her home regimen of tylenol, oxycodone 15mg, and muscle relaxers. She reported that she moved to the area about 6 weeks ago after living in [place]. She was receiving chronic exchange transfusions there but is not currently receiving them as part of her therapy here. She is seeing a hematologist. She also reports that she previously was on MS contin, pen VK, and hydroxurea as part of her home regimen but has not been put back on those since she has moved here. Of note, during that recent admission, the provider was informed by nursing that the patient asked if she could be given a little more medicine than what was prescribed stating "no one has to know". She also asked if nursing needed some help to throw away an empty flush syringe and an empty med syringe. At that time, she was prescribed Dilaudid 3mg IV q3h PRN and IVF with improvement in her pain.    In the ED, /99 HR 74 RR 20 sats adequate on ambient air. Tmax 100.9. Labs notable for Hgb 9.8 and K 2.9. LA wnl. COVID, Flu, and RSV negative. Blood cultures were obtained. UA was ordered. CXR showed . Received Dilaudid 3mg IV in total, Tylenol 1g, Benadryl 25mg PO, Zofran 4mg " IV, and potassium 25mEq PO.    The patient was admitted to Hospital Medicine for further workup and management.    Overview/Hospital Course:  44-year-old female with sickle cell beta thalassemia zero, multiple pulmonary emboli on chronic AC, asthma, and HTN who was admitted for acute sickle cell pain crisis.Initiated on oxy/dilaudid/benadryl prn. Blood cultures positive for CONS and due to port ID was consulted and recommended port removal with IV vanc. Recommended course until 1/19/25. Patient wanting new port during hospitalization. Discussed with IR after cultures negative at 48 hours and decision made for outpatient port. Wean dilaudid as tolerated    Interval History: complaining of L arm pain. Obtain U/S LUE    Review of Systems  Objective:     Vital Signs (Most Recent):  Temp: 98.6 °F (37 °C) (01/18/25 1136)  Pulse: 92 (01/18/25 1136)  Resp: 18 (01/18/25 1136)  BP: 118/66 (01/18/25 1136)  SpO2: (!) 93 % (01/18/25 1136) Vital Signs (24h Range):  Temp:  [98.6 °F (37 °C)-99.1 °F (37.3 °C)] 98.6 °F (37 °C)  Pulse:  [] 92  Resp:  [12-22] 18  SpO2:  [93 %-99 %] 93 %  BP: (116-173)/(64-89) 118/66     Weight: 93.4 kg (205 lb 14.6 oz)  Body mass index is 37.66 kg/m².    Intake/Output Summary (Last 24 hours) at 1/18/2025 1328  Last data filed at 1/18/2025 0607  Gross per 24 hour   Intake 480 ml   Output --   Net 480 ml         Physical Exam  Constitutional:       General: She is not in acute distress.     Appearance: She is obese.   Cardiovascular:      Rate and Rhythm: Normal rate.      Pulses: Normal pulses.   Pulmonary:      Effort: No respiratory distress.      Breath sounds: Normal breath sounds. No wheezing.   Abdominal:      General: Bowel sounds are normal. There is no distension.      Palpations: Abdomen is soft.      Tenderness: There is no abdominal tenderness.   Musculoskeletal:         General: No swelling or tenderness (L arm).   Skin:     General: Skin is warm.   Neurological:      Mental Status:  She is alert and oriented to person, place, and time. Mental status is at baseline.             Significant Labs: All pertinent labs within the past 24 hours have been reviewed.    Significant Imaging: I have reviewed all pertinent imaging results/findings within the past 24 hours.      Assessment and Plan     * Vaso-occlusive pain due to sickle cell disease  44-year-old female with sickle cell beta thalassemia zero, multiple pulmonary emboli on chronic AC, asthma, and HTN who [place] [facility] [place] for diffuse myalgias and pain consistent with a sickle cell pain crisis as well as fever, nausea, vomiting, and diarrhea for the past day. She [place] [facility] [place] 1[place]5/[place] for a pain crisis.  CXR without focal consolidation. I am not concerned for acute chest at this time.    Plan  - Multimodal pain regimen: scheduled tylenol, robaxin, and oxycodone PRN   - Dilaudid 2mg IV q3h prn,  - mIVF  - Folic acid, hydroxyurea and other home SCD medications reordered  - repeat CXR 1/11/25 without signs of acute chest, atelectasis present .incentive spirometry     Bacteremia due to Staphylococcus epidermidis  -blood cultures with staph epidermis and another CONS   -due to port placement ID consult placed  -stopped Rocephin and started on Vancomycin   -TTE negative for vegetations   -s/p Port removal with General Surgery 1/12/25  -repeat BC NGTD  -Cleared by Heme Onc and ID as cultures have been negative  -IR consult placed for port , recommended to be done OP  -planning for Vancomycin with ALYSE 1/19/25          Chronic prescription opiate use  Uses  tylenol, oxycodone 15mg, and muscle relaxers. Pain has been refractory to these due to reported emesis.    Plan  - See Vaso-occlusive pain      Chronic gastritis  Chronic and stable.    Plan  - Continue home PPI and carafate      Chronic anticoagulation  See History of PE      History of pulmonary embolism  Chronic and stable.    Plan  - Continue home  Eliquis        Intermittent asthma  Chronic and stable. Not in respiratory distress.    Plan  - Continue home albuterol PRN      Hypertension  Patient's blood pressure range in the last 24 hours was: BP  Min: 129/83  Max: 159/92.The patient's inpatient anti-hypertensive regimen is listed below:  Current Antihypertensives  amLODIPine tablet 10 mg, Daily, Oral    Plan  - BP is controlled, no changes needed to their regimen      VTE Risk Mitigation (From admission, onward)           Ordered     apixaban tablet 5 mg  2 times daily         01/12/25 1258     IP VTE HIGH RISK PATIENT  Once         01/07/25 2326     Place sequential compression device  Until discontinued         01/07/25 2326                    Discharge Planning   ALYSE: 1/18/2025     Code Status: Full Code   Medical Readiness for Discharge Date:   Discharge Plan A: Home with family   Discharge Delays: None known at this time                    Francoise Luna MD  Department of Hospital Medicine   [facility] S[facility]

## 2025-01-18 NOTE — SUBJECTIVE & OBJECTIVE
Interval History: complaining of L arm pain. Obtain U/S [facility] [place]    Review of Systems  Objective:     Vital Signs (Most Recent):  Temp: 98.6 °F (37 °C) (01/18/25 1136)  Pulse: 92 (01/18/25 1136)  Resp: 18 (01/18/25 1136)  BP: 118/66 (01/18/25 1136)  SpO2: (!) 93 % (01/18/25 1136) Vital Signs (24h Range):  Temp:  [98.6 °F (37 °C)-99.1 °F (37.3 °C)] 98.6 °F (37 °C)  Pulse:  [] 92  Resp:  [12-22] 18  SpO2:  [93 %-99 %] 93 %  BP: (116-173)/(64-89) 118/66     Weight: 93.4 kg (205 lb 14.6 oz)  Body mass index is 37.66 kg/m².    Intake/Output Summary (Last 24 hours) at 1/18/2025 1328  Last data filed at 1/18/2025 0607  Gross per 24 hour   Intake 480 ml   Output --   Net 480 ml         Physical Exam  Constitutional:       General: She is not in acute distress.     Appearance: She is obese.   Cardiovascular:      Rate and Rhythm: Normal rate.      Pulses: Normal pulses.   Pulmonary:      Effort: No respiratory distress.      Breath sounds: Normal breath sounds. No wheezing.   Abdominal:      General: Bowel sounds are normal. There is no distension.      Palpations: Abdomen is soft.      Tenderness: There is no abdominal tenderness.   Musculoskeletal:         General: No swelling or tenderness (L arm).   Skin:     General: Skin is warm.   Neurological:      Mental Status: She is alert and oriented to person, place, and time. Mental status is at baseline.             Significant Labs: All pertinent labs within the past 24 hours have been reviewed.    Significant Imaging: I have reviewed all pertinent imaging results/findings within the past 24 hours.

## 2025-01-18 NOTE — PLAN OF CARE
Problem: Adult Inpatient Plan of Care  Goal: Plan of Care Review  Outcome: Progressing  Goal: Patient-Specific Goal (Individualized)  Outcome: Progressing  Goal: Absence of Hospital-Acquired Illness or Injury  Outcome: Progressing  Goal: Optimal Comfort and Wellbeing  Outcome: Progressing  Goal: Readiness for Transition of Care  Outcome: Progressing   Patient left arm has been bothering he; continue to monitor. Patient pain level has been at a consistent 10 throughout shift. Patient had no other health changes.

## 2025-01-18 NOTE — PROGRESS NOTES
Pharmacokinetic Assessment Follow Up: IV Vancomycin    Vancomycin serum concentration assessment(s):    The trough level was drawn correctly and can be used to guide therapy at this time. The measurement is above the desired definitive target range of 15 to 20 mcg/mL.    Vancomycin Regimen Plan:  -Change regimen to vancomycin 750mg IV every 12 hours due to level >20mcg/mL today,   -Renal function stable  -Will draw next vancomycin trough level on 01/19 at 1930 before the third dose of new regimen       Drug levels (last 3 results):  Recent Labs   Lab Result Units 01/16/25  0634 01/16/25  1723 01/18/25  0742   Vancomycin-Trough ug/mL 45.0* 22.0 21.6       Pharmacy will continue to follow and monitor vancomycin.    Please contact pharmacy at extension [phone] for questions regarding this assessment.    Thank you for the consult,   Shahla Kemp       Patient brief summary:  Nazanin Malone is a 44 y.o. female initiated on antimicrobial therapy with IV Vancomycin for treatment of bacteremia      Drug Allergies:   Review of patient's allergies indicates:   Allergen Reactions    Cat dander Anaphylaxis, Itching and Shortness Of Breath    Nsaids (non-steroidal anti-inflammatory drug) Itching and Anaphylaxis    Latex Rash       Actual Body Weight:   93.4kg     Renal Function:   Estimated Creatinine Clearance: 83.1 mL/min (based on SCr of 0.9 mg/dL).,     Dialysis Method (if applicable):  N/A

## 2025-01-19 PROBLEM — I82.612 SUPERFICIAL VENOUS THROMBOSIS OF LEFT ARM: Status: ACTIVE | Noted: 2025-01-19

## 2025-01-19 LAB
INR PPP: 1 (ref 0.8–1.2)
PROTHROMBIN TIME: 11.1 SEC (ref 9–12.5)

## 2025-01-19 PROCEDURE — 25000003 PHARM REV CODE 250

## 2025-01-19 PROCEDURE — 25000003 PHARM REV CODE 250: Performed by: EMERGENCY MEDICINE

## 2025-01-19 PROCEDURE — 94761 N-INVAS EAR/PLS OXIMETRY MLT: CPT

## 2025-01-19 PROCEDURE — 85610 PROTHROMBIN TIME: CPT | Performed by: STUDENT IN AN ORGANIZED HEALTH CARE EDUCATION/TRAINING PROGRAM

## 2025-01-19 PROCEDURE — 36415 COLL VENOUS BLD VENIPUNCTURE: CPT | Performed by: STUDENT IN AN ORGANIZED HEALTH CARE EDUCATION/TRAINING PROGRAM

## 2025-01-19 PROCEDURE — 25000003 PHARM REV CODE 250: Performed by: STUDENT IN AN ORGANIZED HEALTH CARE EDUCATION/TRAINING PROGRAM

## 2025-01-19 PROCEDURE — 63600175 PHARM REV CODE 636 W HCPCS: Performed by: STUDENT IN AN ORGANIZED HEALTH CARE EDUCATION/TRAINING PROGRAM

## 2025-01-19 PROCEDURE — 11000001 HC ACUTE MED/SURG PRIVATE ROOM

## 2025-01-19 RX ORDER — HYDROMORPHONE HYDROCHLORIDE 1 MG/ML
1 INJECTION, SOLUTION INTRAMUSCULAR; INTRAVENOUS; SUBCUTANEOUS ONCE
Status: COMPLETED | OUTPATIENT
Start: 2025-01-19 | End: 2025-01-19

## 2025-01-19 RX ADMIN — Medication 6 MG: at 08:01

## 2025-01-19 RX ADMIN — VANCOMYCIN HYDROCHLORIDE 750 MG: 750 INJECTION, POWDER, LYOPHILIZED, FOR SOLUTION INTRAVENOUS at 08:01

## 2025-01-19 RX ADMIN — METHOCARBAMOL 1000 MG: 500 TABLET ORAL at 09:01

## 2025-01-19 RX ADMIN — DIPHENHYDRAMINE HYDROCHLORIDE 25 MG: 50 INJECTION, SOLUTION INTRAMUSCULAR; INTRAVENOUS at 02:01

## 2025-01-19 RX ADMIN — OXYCODONE HYDROCHLORIDE 15 MG: 10 TABLET ORAL at 01:01

## 2025-01-19 RX ADMIN — HYDROMORPHONE HYDROCHLORIDE 2 MG: 1 INJECTION, SOLUTION INTRAMUSCULAR; INTRAVENOUS; SUBCUTANEOUS at 10:01

## 2025-01-19 RX ADMIN — HYDROMORPHONE HYDROCHLORIDE 2 MG: 1 INJECTION, SOLUTION INTRAMUSCULAR; INTRAVENOUS; SUBCUTANEOUS at 02:01

## 2025-01-19 RX ADMIN — SENNOSIDES AND DOCUSATE SODIUM 1 TABLET: 50; 8.6 TABLET ORAL at 09:01

## 2025-01-19 RX ADMIN — DIPHENHYDRAMINE HYDROCHLORIDE 25 MG: 50 INJECTION, SOLUTION INTRAMUSCULAR; INTRAVENOUS at 10:01

## 2025-01-19 RX ADMIN — POLYETHYLENE GLYCOL 3350 17 G: 17 POWDER, FOR SOLUTION ORAL at 09:01

## 2025-01-19 RX ADMIN — HYDROXYUREA 1000 MG: 500 CAPSULE ORAL at 09:01

## 2025-01-19 RX ADMIN — PANTOPRAZOLE SODIUM 40 MG: 40 TABLET, DELAYED RELEASE ORAL at 09:01

## 2025-01-19 RX ADMIN — APIXABAN 5 MG: 5 TABLET, FILM COATED ORAL at 08:01

## 2025-01-19 RX ADMIN — OXYCODONE HYDROCHLORIDE 15 MG: 10 TABLET ORAL at 05:01

## 2025-01-19 RX ADMIN — FOLIC ACID 1 MG: 1 TABLET ORAL at 09:01

## 2025-01-19 RX ADMIN — DIPHENHYDRAMINE HYDROCHLORIDE 25 MG: 50 INJECTION, SOLUTION INTRAMUSCULAR; INTRAVENOUS at 03:01

## 2025-01-19 RX ADMIN — HYDROMORPHONE HYDROCHLORIDE 2 MG: 1 INJECTION, SOLUTION INTRAMUSCULAR; INTRAVENOUS; SUBCUTANEOUS at 07:01

## 2025-01-19 RX ADMIN — GABAPENTIN 600 MG: 300 CAPSULE ORAL at 02:01

## 2025-01-19 RX ADMIN — SUCRALFATE 1 G: 1 TABLET ORAL at 10:01

## 2025-01-19 RX ADMIN — ACETAMINOPHEN 975 MG: 325 TABLET ORAL at 02:01

## 2025-01-19 RX ADMIN — METHOCARBAMOL 1000 MG: 500 TABLET ORAL at 02:01

## 2025-01-19 RX ADMIN — DIPHENHYDRAMINE HYDROCHLORIDE 25 MG: 50 INJECTION, SOLUTION INTRAMUSCULAR; INTRAVENOUS at 06:01

## 2025-01-19 RX ADMIN — GABAPENTIN 600 MG: 300 CAPSULE ORAL at 09:01

## 2025-01-19 RX ADMIN — HYDROMORPHONE HYDROCHLORIDE 2 MG: 1 INJECTION, SOLUTION INTRAMUSCULAR; INTRAVENOUS; SUBCUTANEOUS at 03:01

## 2025-01-19 RX ADMIN — SENNOSIDES AND DOCUSATE SODIUM 1 TABLET: 50; 8.6 TABLET ORAL at 08:01

## 2025-01-19 RX ADMIN — HYDROMORPHONE HYDROCHLORIDE 2 MG: 1 INJECTION, SOLUTION INTRAMUSCULAR; INTRAVENOUS; SUBCUTANEOUS at 06:01

## 2025-01-19 RX ADMIN — SUCRALFATE 1 G: 1 TABLET ORAL at 06:01

## 2025-01-19 RX ADMIN — PANTOPRAZOLE SODIUM 40 MG: 40 TABLET, DELAYED RELEASE ORAL at 08:01

## 2025-01-19 RX ADMIN — VANCOMYCIN HYDROCHLORIDE 750 MG: 750 INJECTION, POWDER, LYOPHILIZED, FOR SOLUTION INTRAVENOUS at 09:01

## 2025-01-19 RX ADMIN — ACETAMINOPHEN 975 MG: 325 TABLET ORAL at 09:01

## 2025-01-19 RX ADMIN — APIXABAN 5 MG: 5 TABLET, FILM COATED ORAL at 09:01

## 2025-01-19 RX ADMIN — GABAPENTIN 600 MG: 300 CAPSULE ORAL at 08:01

## 2025-01-19 RX ADMIN — SUCRALFATE 1 G: 1 TABLET ORAL at 05:01

## 2025-01-19 RX ADMIN — SUCRALFATE 1 G: 1 TABLET ORAL at 08:01

## 2025-01-19 RX ADMIN — HYDROMORPHONE HYDROCHLORIDE 1 MG: 1 INJECTION, SOLUTION INTRAMUSCULAR; INTRAVENOUS; SUBCUTANEOUS at 12:01

## 2025-01-19 RX ADMIN — OXYCODONE HYDROCHLORIDE 15 MG: 10 TABLET ORAL at 09:01

## 2025-01-19 RX ADMIN — METHOCARBAMOL 1000 MG: 500 TABLET ORAL at 08:01

## 2025-01-19 RX ADMIN — AMLODIPINE BESYLATE 10 MG: 10 TABLET ORAL at 09:01

## 2025-01-19 RX ADMIN — OXYCODONE HYDROCHLORIDE 15 MG: 10 TABLET ORAL at 11:01

## 2025-01-19 RX ADMIN — DIPHENHYDRAMINE HYDROCHLORIDE 25 MG: 50 INJECTION, SOLUTION INTRAMUSCULAR; INTRAVENOUS at 07:01

## 2025-01-19 NOTE — PLAN OF CARE
Problem: Adult Inpatient Plan of Care  Goal: Plan of Care Review  Outcome: Progressing  Goal: Patient-Specific Goal (Individualized)  Outcome: Progressing  Goal: Absence of Hospital-Acquired Illness or Injury  Outcome: Progressing  Goal: Optimal Comfort and Wellbeing  Outcome: Progressing  Goal: Readiness for Transition of Care  Outcome: Progressing   Patient still having pain that ranges from 7 to 10. Patient receiving pain medication PRN around the clock. Patient has a new IV in right distal forearm 20 G. Patient has pain in left upper arm that is swollen and red. Patient had ultrasound on TERRI to see if it could be a possible clot. Patient had no other health changes.

## 2025-01-19 NOTE — ASSESSMENT & PLAN NOTE
-blood cultures with staph epidermis and another CONS   -due to port placement ID consult placed  -stopped Rocephin and started on Vancomycin   -TTE negative for vegetations   -s/p Port removal with General Surgery 1/12/25  -repeat BC NGTD  -Cleared by Heme Onc and ID as cultures have been negative  -IR consult placed for port, recommended to be done OP  -planning for Vancomycin with ALYSE 1/19/25

## 2025-01-19 NOTE — ASSESSMENT & PLAN NOTE
U/S LUE revealed occlusive thrombus of L cephalic vein and partial occlusive thrombus of L IJ improved from previous. Heat compresses and prn pain control as needed.

## 2025-01-19 NOTE — PLAN OF CARE
-still with sickle cell crisis, no anticipated CM needs   01/19/25 0915   Discharge Reassessment   Assessment Type Discharge Planning Reassessment   Did the patient's condition or plan change since previous assessment? No   Discharge Plan discussed with: Patient   Communicated ALYSE with patient/caregiver Date not available/Unable to determine   Discharge Plan A Home with family   Discharge Plan B Home   DME Needed Upon Discharge  none   Transition of Care Barriers None   Why the patient remains in the hospital Requires continued medical care   Post-Acute Status   Discharge Delays None known at this time

## 2025-01-19 NOTE — SUBJECTIVE & OBJECTIVE
Interval History: L arm pain    Review of Systems  Objective:     Vital Signs (Most Recent):  Temp: 98.6 °F (37 °C) (01/19/25 1120)  Pulse: 88 (01/19/25 1120)  Resp: 18 (01/19/25 1329)  BP: 118/73 (01/19/25 1120)  SpO2: 97 % (01/19/25 1120) Vital Signs (24h Range):  Temp:  [97.3 °F (36.3 °C)-98.6 °F (37 °C)] 98.6 °F (37 °C)  Pulse:  [88-98] 88  Resp:  [14-20] 18  SpO2:  [93 %-98 %] 97 %  BP: (118-151)/(71-91) 118/73     Weight: 93.4 kg (205 lb 14.6 oz)  Body mass index is 37.66 kg/m².  No intake or output data in the 24 hours ending 01/19/25 1341      Physical Exam  Constitutional:       General: She is not in acute distress.  Cardiovascular:      Rate and Rhythm: Normal rate.      Pulses: Normal pulses.   Pulmonary:      Effort: No respiratory distress.      Breath sounds: Normal breath sounds. No wheezing.   Abdominal:      General: Bowel sounds are normal. There is no distension.      Palpations: Abdomen is soft.      Tenderness: There is no abdominal tenderness.   Musculoskeletal:      Right lower leg: No edema.      Left lower leg: No edema.   Neurological:      Mental Status: She is alert and oriented to person, place, and time. Mental status is at baseline.             Significant Labs: All pertinent labs within the past 24 hours have been reviewed.    Significant Imaging: I have reviewed all pertinent imaging results/findings within the past 24 hours.

## 2025-01-19 NOTE — PROGRESS NOTES
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 She was recently admitted to [facility] 12/29/24 - 1/5/25 for pain c/w sickle cell pain crises which was refractory to her home regimen of tylenol, oxycodone 15mg, and muscle relaxers. She reported that she moved to the area about 6 weeks ago after living in [place]. She was receiving chronic exchange transfusions there but is not currently receiving them as part of her therapy here. She is seeing a hematologist. She also reports that she previously was on MS contin, pen VK, and hydroxurea as part of her home regimen but has not been put back on those since she has moved here. Of note, during that recent admission, the provider was informed by nursing that the patient asked if she could be given a little more medicine than what was prescribed stating "no one has to know". She also asked if nursing needed some help to throw away an empty flush syringe and an empty med syringe. At that time, she was prescribed Dilaudid 3mg IV q3h PRN and IVF with improvement in her pain.    In the ED, /99 HR 74 RR 20 sats adequate on ambient air. Tmax 100.9. Labs notable for Hgb 9.8 and K 2.9. LA wnl. COVID, Flu, and RSV negative. Blood cultures were obtained. UA was ordered. CXR showed . Received Dilaudid 3mg IV in total, Tylenol 1g, Benadryl 25mg PO, Zofran 4mg " IV, and potassium 25mEq PO.    The patient was admitted to Hospital Medicine for further workup and management.    Overview/Hospital Course:  44-year-old female with sickle cell beta thalassemia zero, multiple pulmonary emboli on chronic AC, asthma, and HTN who was admitted for acute sickle cell pain crisis.Initiated on oxy/dilaudid/benadryl prn. Blood cultures positive for CONS and due to port ID was consulted and recommended port removal with IV vanc. Recommended course until 1/19/25. Patient wanting new port during hospitalization. Discussed with IR after cultures negative at 48 hours and decision made for outpatient port. Wean dilaudid as tolerated. U/S LUE revealed occlusive thrombus of L cephalic vein and partial occlusive thrombus of L IJ improved from previous. Heat compresses and prn pain control as needed    Interval History: L arm pain    Review of Systems  Objective:     Vital Signs (Most Recent):  Temp: 98.6 °F (37 °C) (01/19/25 1120)  Pulse: 88 (01/19/25 1120)  Resp: 18 (01/19/25 1329)  BP: 118/73 (01/19/25 1120)  SpO2: 97 % (01/19/25 1120) Vital Signs (24h Range):  Temp:  [97.3 °F (36.3 °C)-98.6 °F (37 °C)] 98.6 °F (37 °C)  Pulse:  [88-98] 88  Resp:  [14-20] 18  SpO2:  [93 %-98 %] 97 %  BP: (118-151)/(71-91) 118/73     Weight: 93.4 kg (205 lb 14.6 oz)  Body mass index is 37.66 kg/m².  No intake or output data in the 24 hours ending 01/19/25 1341      Physical Exam  Constitutional:       General: She is not in acute distress.  Cardiovascular:      Rate and Rhythm: Normal rate.      Pulses: Normal pulses.   Pulmonary:      Effort: No respiratory distress.      Breath sounds: Normal breath sounds. No wheezing.   Abdominal:      General: Bowel sounds are normal. There is no distension.      Palpations: Abdomen is soft.      Tenderness: There is no abdominal tenderness.   Musculoskeletal:      Right lower leg: No edema.      Left lower leg: No edema.   Neurological:      Mental Status: She is alert and  oriented to person, place, and time. Mental status is at baseline.             Significant Labs: All pertinent labs within the past 24 hours have been reviewed.    Significant Imaging: I have reviewed all pertinent imaging results/findings within the past 24 hours.      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 She was recently admitted to [facility] 12/29/24 - 1/5/25 for a pain crisis.  CXR without focal consolidation. I am not concerned for acute chest at this time.    Plan  - Multimodal pain regimen: scheduled tylenol, robaxin, and oxycodone PRN   - Dilaudid 2mg IV q3h prn,  - mIVF  - Folic acid, hydroxyurea and other home SCD medications reordered  - repeat CXR 1/11/25 without signs of acute chest, atelectasis present .incentive spirometry     Superficial venous thrombosis of left arm    U/S LUE revealed occlusive thrombus of L cephalic vein and partial occlusive thrombus of L IJ improved from previous. Heat compresses and prn pain control as needed.     Bacteremia due to Staphylococcus epidermidis  -blood cultures with staph epidermis and another CONS   -due to port placement ID consult placed  -stopped Rocephin and started on Vancomycin   -TTE negative for vegetations   -s/p Port removal with General Surgery 1/12/25  -repeat BC NGTD  -Cleared by Heme Onc and ID as cultures have been negative  -IR consult placed for port, recommended to be done OP  -planning for Vancomycin with ALYSE 1/19/25          Chronic prescription opiate use  Uses  tylenol, oxycodone 15mg, and muscle relaxers. Pain has been refractory to these due to reported emesis.    Plan  - See Vaso-occlusive pain      Chronic gastritis  Chronic  and stable.    Plan  - Continue home PPI and carafate      Chronic anticoagulation  See History of PE      History of pulmonary embolism  Chronic and stable.    Plan  - Continue home Eliquis        Intermittent asthma  Chronic and stable. Not in respiratory distress.    Plan  - Continue home albuterol PRN      Hypertension  Patient's blood pressure range in the last 24 hours was: BP  Min: 129/83  Max: 159/92.The patient's inpatient anti-hypertensive regimen is listed below:  Current Antihypertensives  amLODIPine tablet 10 mg, Daily, Oral    Plan  - BP is controlled, no changes needed to their regimen      VTE Risk Mitigation (From admission, onward)           Ordered     apixaban tablet 5 mg  2 times daily         01/12/25 1258     IP VTE HIGH RISK PATIENT  Once         01/07/25 2326     Place sequential compression device  Until discontinued         01/07/25 2326                    Discharge Planning   ALYSE: 1/20/2025     Code Status: Full Code   Medical Readiness for Discharge Date:   Discharge Plan A: Home with family   Discharge Delays: None known at this time                    Francoise Luna MD  Department of Hospital Medicine   [facility]

## 2025-01-20 LAB
ALBUMIN SERPL BCP-MCNC: 3.1 G/DL (ref 3.5–5.2)
ALP SERPL-CCNC: 77 U/L (ref 40–150)
ALT SERPL W/O P-5'-P-CCNC: 19 U/L (ref 10–44)
ANION GAP SERPL CALC-SCNC: 7 MMOL/L (ref 8–16)
AST SERPL-CCNC: 18 U/L (ref 10–40)
BASOPHILS # BLD AUTO: 0.04 K/UL (ref 0–0.2)
BASOPHILS NFR BLD: 0.5 % (ref 0–1.9)
BILIRUB SERPL-MCNC: 0.3 MG/DL (ref 0.1–1)
BUN SERPL-MCNC: 18 MG/DL (ref 6–20)
CALCIUM SERPL-MCNC: 9.1 MG/DL (ref 8.7–10.5)
CHLORIDE SERPL-SCNC: 103 MMOL/L (ref 95–110)
CO2 SERPL-SCNC: 25 MMOL/L (ref 23–29)
CREAT SERPL-MCNC: 0.9 MG/DL (ref 0.5–1.4)
DIFFERENTIAL METHOD BLD: ABNORMAL
EOSINOPHIL # BLD AUTO: 0.5 K/UL (ref 0–0.5)
EOSINOPHIL NFR BLD: 6 % (ref 0–8)
ERYTHROCYTE [DISTWIDTH] IN BLOOD BY AUTOMATED COUNT: 23.1 % (ref 11.5–14.5)
EST. GFR  (NO RACE VARIABLE): >60 ML/MIN/1.73 M^2
GLUCOSE SERPL-MCNC: 76 MG/DL (ref 70–110)
HCT VFR BLD AUTO: 23.6 % (ref 37–48.5)
HGB BLD-MCNC: 7.9 G/DL (ref 12–16)
IMM GRANULOCYTES # BLD AUTO: 0.03 K/UL (ref 0–0.04)
IMM GRANULOCYTES NFR BLD AUTO: 0.4 % (ref 0–0.5)
LYMPHOCYTES # BLD AUTO: 2.8 K/UL (ref 1–4.8)
LYMPHOCYTES NFR BLD: 34.6 % (ref 18–48)
MCH RBC QN AUTO: 21.9 PG (ref 27–31)
MCHC RBC AUTO-ENTMCNC: 33.5 G/DL (ref 32–36)
MCV RBC AUTO: 65 FL (ref 82–98)
MONOCYTES # BLD AUTO: 0.9 K/UL (ref 0.3–1)
MONOCYTES NFR BLD: 11.2 % (ref 4–15)
NEUTROPHILS # BLD AUTO: 3.8 K/UL (ref 1.8–7.7)
NEUTROPHILS NFR BLD: 47.3 % (ref 38–73)
NRBC BLD-RTO: 7 /100 WBC
PLATELET # BLD AUTO: 175 K/UL (ref 150–450)
PMV BLD AUTO: 9.7 FL (ref 9.2–12.9)
POTASSIUM SERPL-SCNC: 3.8 MMOL/L (ref 3.5–5.1)
PROT SERPL-MCNC: 7 G/DL (ref 6–8.4)
RBC # BLD AUTO: 3.61 M/UL (ref 4–5.4)
SODIUM SERPL-SCNC: 135 MMOL/L (ref 136–145)
WBC # BLD AUTO: 7.98 K/UL (ref 3.9–12.7)

## 2025-01-20 PROCEDURE — 36415 COLL VENOUS BLD VENIPUNCTURE: CPT | Performed by: STUDENT IN AN ORGANIZED HEALTH CARE EDUCATION/TRAINING PROGRAM

## 2025-01-20 PROCEDURE — 63600175 PHARM REV CODE 636 W HCPCS: Performed by: STUDENT IN AN ORGANIZED HEALTH CARE EDUCATION/TRAINING PROGRAM

## 2025-01-20 PROCEDURE — 25000003 PHARM REV CODE 250: Performed by: EMERGENCY MEDICINE

## 2025-01-20 PROCEDURE — 11000001 HC ACUTE MED/SURG PRIVATE ROOM

## 2025-01-20 PROCEDURE — 80053 COMPREHEN METABOLIC PANEL: CPT | Performed by: STUDENT IN AN ORGANIZED HEALTH CARE EDUCATION/TRAINING PROGRAM

## 2025-01-20 PROCEDURE — 25000003 PHARM REV CODE 250: Performed by: STUDENT IN AN ORGANIZED HEALTH CARE EDUCATION/TRAINING PROGRAM

## 2025-01-20 PROCEDURE — 25000003 PHARM REV CODE 250

## 2025-01-20 PROCEDURE — 85025 COMPLETE CBC W/AUTO DIFF WBC: CPT | Performed by: STUDENT IN AN ORGANIZED HEALTH CARE EDUCATION/TRAINING PROGRAM

## 2025-01-20 RX ADMIN — APIXABAN 5 MG: 5 TABLET, FILM COATED ORAL at 10:01

## 2025-01-20 RX ADMIN — METHOCARBAMOL 1000 MG: 500 TABLET ORAL at 02:01

## 2025-01-20 RX ADMIN — DIPHENHYDRAMINE HYDROCHLORIDE 25 MG: 50 INJECTION, SOLUTION INTRAMUSCULAR; INTRAVENOUS at 03:01

## 2025-01-20 RX ADMIN — OXYCODONE HYDROCHLORIDE 15 MG: 10 TABLET ORAL at 06:01

## 2025-01-20 RX ADMIN — DIPHENHYDRAMINE HYDROCHLORIDE 25 MG: 50 INJECTION, SOLUTION INTRAMUSCULAR; INTRAVENOUS at 01:01

## 2025-01-20 RX ADMIN — SENNOSIDES AND DOCUSATE SODIUM 1 TABLET: 50; 8.6 TABLET ORAL at 10:01

## 2025-01-20 RX ADMIN — METHOCARBAMOL 1000 MG: 500 TABLET ORAL at 10:01

## 2025-01-20 RX ADMIN — POLYETHYLENE GLYCOL 3350 17 G: 17 POWDER, FOR SOLUTION ORAL at 10:01

## 2025-01-20 RX ADMIN — OXYCODONE HYDROCHLORIDE 15 MG: 10 TABLET ORAL at 10:01

## 2025-01-20 RX ADMIN — HYDROMORPHONE HYDROCHLORIDE 2 MG: 1 INJECTION, SOLUTION INTRAMUSCULAR; INTRAVENOUS; SUBCUTANEOUS at 01:01

## 2025-01-20 RX ADMIN — SUCRALFATE 1 G: 1 TABLET ORAL at 10:01

## 2025-01-20 RX ADMIN — SUCRALFATE 1 G: 1 TABLET ORAL at 05:01

## 2025-01-20 RX ADMIN — HYDROMORPHONE HYDROCHLORIDE 2 MG: 1 INJECTION, SOLUTION INTRAMUSCULAR; INTRAVENOUS; SUBCUTANEOUS at 08:01

## 2025-01-20 RX ADMIN — DIPHENHYDRAMINE HYDROCHLORIDE 25 MG: 50 INJECTION, SOLUTION INTRAMUSCULAR; INTRAVENOUS at 08:01

## 2025-01-20 RX ADMIN — GABAPENTIN 600 MG: 300 CAPSULE ORAL at 02:01

## 2025-01-20 RX ADMIN — OXYCODONE HYDROCHLORIDE 15 MG: 10 TABLET ORAL at 04:01

## 2025-01-20 RX ADMIN — OXYCODONE HYDROCHLORIDE 15 MG: 10 TABLET ORAL at 09:01

## 2025-01-20 RX ADMIN — PANTOPRAZOLE SODIUM 40 MG: 40 TABLET, DELAYED RELEASE ORAL at 08:01

## 2025-01-20 RX ADMIN — GABAPENTIN 600 MG: 300 CAPSULE ORAL at 10:01

## 2025-01-20 RX ADMIN — DIPHENHYDRAMINE HYDROCHLORIDE 25 MG: 50 INJECTION, SOLUTION INTRAMUSCULAR; INTRAVENOUS at 10:01

## 2025-01-20 RX ADMIN — SUCRALFATE 1 G: 1 TABLET ORAL at 08:01

## 2025-01-20 RX ADMIN — HYDROMORPHONE HYDROCHLORIDE 2 MG: 1 INJECTION, SOLUTION INTRAMUSCULAR; INTRAVENOUS; SUBCUTANEOUS at 10:01

## 2025-01-20 RX ADMIN — PANTOPRAZOLE SODIUM 40 MG: 40 TABLET, DELAYED RELEASE ORAL at 10:01

## 2025-01-20 RX ADMIN — GABAPENTIN 600 MG: 300 CAPSULE ORAL at 08:01

## 2025-01-20 RX ADMIN — OXYCODONE HYDROCHLORIDE 15 MG: 10 TABLET ORAL at 01:01

## 2025-01-20 RX ADMIN — ACETAMINOPHEN 975 MG: 325 TABLET ORAL at 10:01

## 2025-01-20 RX ADMIN — AMLODIPINE BESYLATE 10 MG: 10 TABLET ORAL at 10:01

## 2025-01-20 RX ADMIN — HYDROMORPHONE HYDROCHLORIDE 2 MG: 1 INJECTION, SOLUTION INTRAMUSCULAR; INTRAVENOUS; SUBCUTANEOUS at 03:01

## 2025-01-20 RX ADMIN — SUCRALFATE 1 G: 1 TABLET ORAL at 04:01

## 2025-01-20 RX ADMIN — SENNOSIDES AND DOCUSATE SODIUM 1 TABLET: 50; 8.6 TABLET ORAL at 08:01

## 2025-01-20 RX ADMIN — Medication 6 MG: at 08:01

## 2025-01-20 RX ADMIN — HYDROMORPHONE HYDROCHLORIDE 2 MG: 1 INJECTION, SOLUTION INTRAMUSCULAR; INTRAVENOUS; SUBCUTANEOUS at 05:01

## 2025-01-20 RX ADMIN — APIXABAN 5 MG: 5 TABLET, FILM COATED ORAL at 08:01

## 2025-01-20 RX ADMIN — HYDROXYUREA 1000 MG: 500 CAPSULE ORAL at 10:01

## 2025-01-20 RX ADMIN — FOLIC ACID 1 MG: 1 TABLET ORAL at 10:01

## 2025-01-20 RX ADMIN — DIPHENHYDRAMINE HYDROCHLORIDE 25 MG: 50 INJECTION, SOLUTION INTRAMUSCULAR; INTRAVENOUS at 05:01

## 2025-01-20 RX ADMIN — METHOCARBAMOL 1000 MG: 500 TABLET ORAL at 08:01

## 2025-01-20 RX ADMIN — ACETAMINOPHEN 975 MG: 325 TABLET ORAL at 08:01

## 2025-01-20 NOTE — PLAN OF CARE
Problem: Adult Inpatient Plan of Care  Goal: Plan of Care Review  Outcome: Progressing  Goal: Patient-Specific Goal (Individualized)  Outcome: Progressing  Goal: Absence of Hospital-Acquired Illness or Injury  Outcome: Progressing  Goal: Optimal Comfort and Wellbeing  Outcome: Progressing   Dr Turner informed of patient complaining of left arm pain extending to left shoulder and chest . Administered hydromorphone 2 mg prn . Patient had received scheduled meds per order prior and around the clock prn pain meds. Pain scale still persisting at 10 at this time . No acute distress observed at this time . Monitoring for pain resolution and awaiting any additional Instructions from MD .

## 2025-01-20 NOTE — PROGRESS NOTES
Pharmacokinetic Assessment Follow Up: IV Vancomycin    Vancomycin order has been discontinued after the last dose on 1/19 @2030. Pharmacy will sign off. Please reconsult as needed! Thank you!    Please contact pharmacy for questions regarding this assessment.    Thank you for the consult,   Angie Wilcox

## 2025-01-20 NOTE — PROGRESS NOTES
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 She was recently admitted to [facility] 12/29/24 - 1/5/25 for pain c/w sickle cell pain crises which was refractory to her home regimen of tylenol, oxycodone 15mg, and muscle relaxers. She reported that she moved to the area about 6 weeks ago after living in [place]. She was receiving chronic exchange transfusions there but is not currently receiving them as part of her therapy here. She is seeing a hematologist. She also reports that she previously was on MS contin, pen VK, and hydroxurea as part of her home regimen but has not been put back on those since she has moved here. Of note, during that recent admission, the provider was informed by nursing that the patient asked if she could be given a little more medicine than what was prescribed stating "no one has to know". She also asked if nursing needed some help to throw away an empty flush syringe and an empty med syringe. At that time, she was prescribed Dilaudid 3mg IV q3h PRN and IVF with improvement in her pain.    In the ED, /99 HR 74 RR 20 sats adequate on ambient air. Tmax 100.9. Labs notable for Hgb 9.8 and K 2.9. LA wnl. COVID, Flu, and RSV negative. Blood cultures were obtained. UA was ordered. CXR showed . Received Dilaudid 3mg IV in total, Tylenol 1g, Benadryl 25mg PO, Zofran 4mg " IV, and potassium 25mEq PO.    The patient was admitted to Hospital Medicine for further workup and management.    Overview/Hospital Course:  44-year-old female with sickle cell beta thalassemia zero, multiple pulmonary emboli on chronic AC, asthma, and HTN who was admitted for acute sickle cell pain crisis.Initiated on oxy/dilaudid/benadryl prn. Blood cultures positive for CONS and due to port ID was consulted and recommended port removal with IV vanc. Recommended course until 1/19/25. Patient wanting new port during hospitalization. Discussed with IR after cultures negative at 48 hours and decision made for outpatient port. Wean dilaudid as tolerated. U/S LUE revealed occlusive thrombus of L cephalic vein and partial occlusive thrombus of L IJ improved from previous. Heat compresses and prn pain control as needed. Patient resistant to weaning down on pain meds today stated she will try to wean down starting tomrrow    Interval History: Reports L arm pain    Review of Systems  Objective:     Vital Signs (Most Recent):  Temp: 98.4 °F (36.9 °C) (01/20/25 1157)  Pulse: 95 (01/20/25 1157)  Resp: 18 (01/20/25 1325)  BP: 120/79 (01/20/25 1157)  SpO2: 97 % (01/20/25 1157) Vital Signs (24h Range):  Temp:  [98.3 °F (36.8 °C)-98.7 °F (37.1 °C)] 98.4 °F (36.9 °C)  Pulse:  [84-95] 95  Resp:  [14-20] 18  SpO2:  [97 %-99 %] 97 %  BP: (120-150)/(79-89) 120/79     Weight: 93.4 kg (205 lb 14.6 oz)  Body mass index is 37.66 kg/m².  No intake or output data in the 24 hours ending 01/20/25 1439      Physical Exam  Constitutional:       General: She is not in acute distress.     Appearance: She is obese.   Cardiovascular:      Rate and Rhythm: Normal rate.      Pulses: Normal pulses.   Pulmonary:      Effort: No respiratory distress.      Breath sounds: Normal breath sounds. No wheezing.   Abdominal:      General: Bowel sounds are normal. There is no distension.      Palpations: Abdomen is soft.      Tenderness: There is no abdominal  tenderness.   Musculoskeletal:      Right lower leg: No edema.      Left lower leg: No edema.   Neurological:      Mental Status: She is alert and oriented to person, place, and time. Mental status is at baseline.             Significant Labs: All pertinent labs within the past 24 hours have been reviewed.    Significant Imaging: I have reviewed all pertinent imaging results/findings within the past 24 hours.      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 She was recently admitted to [facility] [place] 12/29/24 - 1/5/25 for a pain crisis.  CXR without focal consolidation. I am not concerned for acute chest at this time.    Plan  - Multimodal pain regimen: scheduled tylenol, robaxin, and oxycodone PRN   - Dilaudid 2mg IV q3h prn,  - mIVF  - Folic acid, hydroxyurea and other home SCD medications reordered  - repeat CXR 1/11/25 without signs of acute chest, atelectasis present .incentive spirometry     Superficial venous thrombosis of left arm    U/S LUE revealed occlusive thrombus of L cephalic vein and partial occlusive thrombus of L IJ improved from previous. Heat compresses and prn pain control as needed.     Bacteremia due to Staphylococcus epidermidis  -blood cultures with staph epidermis and another CONS   -due to port placement ID consult placed  -stopped Rocephin and started on Vancomycin   -TTE negative for vegetations   -s/p Port removal with General Surgery 1/12/25  -repeat BC NGTD  -Cleared by Heme Onc and ID as cultures have been negative  -IR consult placed for port, recommended to be done OP  -completed Vancomycin with ALYSE 1/19/25          Chronic prescription opiate use  Uses  tylenol, oxycodone 15mg, and  muscle relaxers. Pain has been refractory to these due to reported emesis.    Plan  - See Vaso-occlusive pain      Chronic gastritis  Chronic and stable.    Plan  - Continue home PPI and carafate      Chronic anticoagulation  See History of PE      History of pulmonary embolism  Chronic and stable.    Plan  - Continue home Eliquis        Intermittent asthma  Chronic and stable. Not in respiratory distress.    Plan  - Continue home albuterol PRN      Hypertension  Patient's blood pressure range in the last 24 hours was: BP  Min: 129/83  Max: 159/92.The patient's inpatient anti-hypertensive regimen is listed below:  Current Antihypertensives  amLODIPine tablet 10 mg, Daily, Oral    Plan  - BP is controlled, no changes needed to their regimen      VTE Risk Mitigation (From admission, onward)           Ordered     apixaban tablet 5 mg  2 times daily         01/12/25 1258     IP VTE HIGH RISK PATIENT  Once         01/07/25 2326     Place sequential compression device  Until discontinued         01/07/25 2326                    Discharge Planning   ALYSE: 1/20/2025     Code Status: Full Code   Medical Readiness for Discharge Date:   Discharge Plan A: Home with family   Discharge Delays: None known at this time                    Francoise Luna MD  Department of Hospital Medicine   [facility]

## 2025-01-20 NOTE — ASSESSMENT & PLAN NOTE
-blood cultures with staph epidermis and another CONS   -due to port placement ID consult placed  -stopped Rocephin and started on Vancomycin   -TTE negative for vegetations   -s/p Port removal with General Surgery 1/12/25  -repeat BC NGTD  -Cleared by Heme Onc and ID as cultures have been negative  -IR consult placed for port, recommended to be done OP  -completed Vancomycin with ALYSE 1/19/25

## 2025-01-20 NOTE — NURSING
Plan of care reviewed with the patient . Understanding verbalized.No acute distress observed.Safety Maintained.Discussed Patient status and prior Ultrasound report with MD . Per MD scheduled and prn pain regimen at place adequate for patient . No new orders for tests / medications  received at this time . Bed at lowest position.Call bell at reach.Bed side table at reach.Instructed Patient to call for assistance whenever required.

## 2025-01-20 NOTE — SUBJECTIVE & OBJECTIVE
Interval History: Reports L arm pain    Review of Systems  Objective:     Vital Signs (Most Recent):  Temp: 98.4 °F (36.9 °C) (01/20/25 1157)  Pulse: 95 (01/20/25 1157)  Resp: 18 (01/20/25 1325)  BP: 120/79 (01/20/25 1157)  SpO2: 97 % (01/20/25 1157) Vital Signs (24h Range):  Temp:  [98.3 °F (36.8 °C)-98.7 °F (37.1 °C)] 98.4 °F (36.9 °C)  Pulse:  [84-95] 95  Resp:  [14-20] 18  SpO2:  [97 %-99 %] 97 %  BP: (120-150)/(79-89) 120/79     Weight: 93.4 kg (205 lb 14.6 oz)  Body mass index is 37.66 kg/m².  No intake or output data in the 24 hours ending 01/20/25 1439      Physical Exam  Constitutional:       General: She is not in acute distress.     Appearance: She is obese.   Cardiovascular:      Rate and Rhythm: Normal rate.      Pulses: Normal pulses.   Pulmonary:      Effort: No respiratory distress.      Breath sounds: Normal breath sounds. No wheezing.   Abdominal:      General: Bowel sounds are normal. There is no distension.      Palpations: Abdomen is soft.      Tenderness: There is no abdominal tenderness.   Musculoskeletal:      Right lower leg: No edema.      Left lower leg: No edema.   Neurological:      Mental Status: She is alert and oriented to person, place, and time. Mental status is at baseline.             Significant Labs: All pertinent labs within the past 24 hours have been reviewed.    Significant Imaging: I have reviewed all pertinent imaging results/findings within the past 24 hours.

## 2025-01-21 PROCEDURE — 63600175 PHARM REV CODE 636 W HCPCS: Performed by: INTERNAL MEDICINE

## 2025-01-21 PROCEDURE — 25000003 PHARM REV CODE 250: Performed by: STUDENT IN AN ORGANIZED HEALTH CARE EDUCATION/TRAINING PROGRAM

## 2025-01-21 PROCEDURE — 63600175 PHARM REV CODE 636 W HCPCS: Performed by: STUDENT IN AN ORGANIZED HEALTH CARE EDUCATION/TRAINING PROGRAM

## 2025-01-21 PROCEDURE — 11000001 HC ACUTE MED/SURG PRIVATE ROOM

## 2025-01-21 PROCEDURE — 25000003 PHARM REV CODE 250

## 2025-01-21 PROCEDURE — 25000003 PHARM REV CODE 250: Performed by: EMERGENCY MEDICINE

## 2025-01-21 PROCEDURE — 94761 N-INVAS EAR/PLS OXIMETRY MLT: CPT

## 2025-01-21 RX ORDER — SODIUM CHLORIDE, SODIUM LACTATE, POTASSIUM CHLORIDE, CALCIUM CHLORIDE 600; 310; 30; 20 MG/100ML; MG/100ML; MG/100ML; MG/100ML
INJECTION, SOLUTION INTRAVENOUS CONTINUOUS
Status: DISCONTINUED | OUTPATIENT
Start: 2025-01-21 | End: 2025-01-24

## 2025-01-21 RX ORDER — HYDROMORPHONE HYDROCHLORIDE 1 MG/ML
3 INJECTION, SOLUTION INTRAMUSCULAR; INTRAVENOUS; SUBCUTANEOUS ONCE
Status: COMPLETED | OUTPATIENT
Start: 2025-01-21 | End: 2025-01-21

## 2025-01-21 RX ORDER — HYDROMORPHONE HCL IN 0.9% NACL 6 MG/30 ML
PATIENT CONTROLLED ANALGESIA SYRINGE INTRAVENOUS CONTINUOUS
Status: DISCONTINUED | OUTPATIENT
Start: 2025-01-21 | End: 2025-01-25 | Stop reason: HOSPADM

## 2025-01-21 RX ORDER — NALOXONE HCL 0.4 MG/ML
0.02 VIAL (ML) INJECTION
Status: DISCONTINUED | OUTPATIENT
Start: 2025-01-21 | End: 2025-01-25 | Stop reason: HOSPADM

## 2025-01-21 RX ADMIN — POLYETHYLENE GLYCOL 3350 17 G: 17 POWDER, FOR SOLUTION ORAL at 08:01

## 2025-01-21 RX ADMIN — SUCRALFATE 1 G: 1 TABLET ORAL at 09:01

## 2025-01-21 RX ADMIN — DIPHENHYDRAMINE HYDROCHLORIDE 25 MG: 50 INJECTION, SOLUTION INTRAMUSCULAR; INTRAVENOUS at 04:01

## 2025-01-21 RX ADMIN — APIXABAN 5 MG: 5 TABLET, FILM COATED ORAL at 08:01

## 2025-01-21 RX ADMIN — HYDROMORPHONE HYDROCHLORIDE 2 MG: 1 INJECTION, SOLUTION INTRAMUSCULAR; INTRAVENOUS; SUBCUTANEOUS at 04:01

## 2025-01-21 RX ADMIN — SENNOSIDES AND DOCUSATE SODIUM 1 TABLET: 50; 8.6 TABLET ORAL at 08:01

## 2025-01-21 RX ADMIN — Medication: at 09:01

## 2025-01-21 RX ADMIN — SUCRALFATE 1 G: 1 TABLET ORAL at 06:01

## 2025-01-21 RX ADMIN — METHOCARBAMOL 1000 MG: 500 TABLET ORAL at 02:01

## 2025-01-21 RX ADMIN — PANTOPRAZOLE SODIUM 40 MG: 40 TABLET, DELAYED RELEASE ORAL at 09:01

## 2025-01-21 RX ADMIN — METHOCARBAMOL 1000 MG: 500 TABLET ORAL at 08:01

## 2025-01-21 RX ADMIN — DIPHENHYDRAMINE HYDROCHLORIDE 25 MG: 50 INJECTION, SOLUTION INTRAMUSCULAR; INTRAVENOUS at 02:01

## 2025-01-21 RX ADMIN — FOLIC ACID 1 MG: 1 TABLET ORAL at 08:01

## 2025-01-21 RX ADMIN — APIXABAN 5 MG: 5 TABLET, FILM COATED ORAL at 09:01

## 2025-01-21 RX ADMIN — SENNOSIDES AND DOCUSATE SODIUM 1 TABLET: 50; 8.6 TABLET ORAL at 09:01

## 2025-01-21 RX ADMIN — HYDROMORPHONE HYDROCHLORIDE 2 MG: 1 INJECTION, SOLUTION INTRAMUSCULAR; INTRAVENOUS; SUBCUTANEOUS at 10:01

## 2025-01-21 RX ADMIN — Medication: at 02:01

## 2025-01-21 RX ADMIN — HYDROMORPHONE HYDROCHLORIDE 3 MG: 1 INJECTION, SOLUTION INTRAMUSCULAR; INTRAVENOUS; SUBCUTANEOUS at 02:01

## 2025-01-21 RX ADMIN — HYDROXYUREA 1000 MG: 500 CAPSULE ORAL at 08:01

## 2025-01-21 RX ADMIN — ACETAMINOPHEN 975 MG: 325 TABLET ORAL at 08:01

## 2025-01-21 RX ADMIN — ACETAMINOPHEN 975 MG: 325 TABLET ORAL at 02:01

## 2025-01-21 RX ADMIN — DIPHENHYDRAMINE HYDROCHLORIDE 25 MG: 50 INJECTION, SOLUTION INTRAMUSCULAR; INTRAVENOUS at 06:01

## 2025-01-21 RX ADMIN — SUCRALFATE 1 G: 1 TABLET ORAL at 10:01

## 2025-01-21 RX ADMIN — AMLODIPINE BESYLATE 10 MG: 10 TABLET ORAL at 08:01

## 2025-01-21 RX ADMIN — DIPHENHYDRAMINE HYDROCHLORIDE 25 MG: 50 INJECTION, SOLUTION INTRAMUSCULAR; INTRAVENOUS at 09:01

## 2025-01-21 RX ADMIN — OXYCODONE HYDROCHLORIDE 15 MG: 10 TABLET ORAL at 01:01

## 2025-01-21 RX ADMIN — DIPHENHYDRAMINE HYDROCHLORIDE 25 MG: 50 INJECTION, SOLUTION INTRAMUSCULAR; INTRAVENOUS at 12:01

## 2025-01-21 RX ADMIN — SUCRALFATE 1 G: 1 TABLET ORAL at 05:01

## 2025-01-21 RX ADMIN — PANTOPRAZOLE SODIUM 40 MG: 40 TABLET, DELAYED RELEASE ORAL at 08:01

## 2025-01-21 RX ADMIN — METHOCARBAMOL 1000 MG: 500 TABLET ORAL at 09:01

## 2025-01-21 RX ADMIN — Medication: at 06:01

## 2025-01-21 RX ADMIN — GABAPENTIN 600 MG: 300 CAPSULE ORAL at 02:01

## 2025-01-21 RX ADMIN — DIPHENHYDRAMINE HYDROCHLORIDE 25 MG: 50 INJECTION, SOLUTION INTRAMUSCULAR; INTRAVENOUS at 10:01

## 2025-01-21 RX ADMIN — SODIUM CHLORIDE, POTASSIUM CHLORIDE, SODIUM LACTATE AND CALCIUM CHLORIDE: 600; 310; 30; 20 INJECTION, SOLUTION INTRAVENOUS at 05:01

## 2025-01-21 RX ADMIN — Medication 6 MG: at 09:01

## 2025-01-21 RX ADMIN — OXYCODONE HYDROCHLORIDE 15 MG: 10 TABLET ORAL at 03:01

## 2025-01-21 RX ADMIN — HYDROMORPHONE HYDROCHLORIDE 2 MG: 1 INJECTION, SOLUTION INTRAMUSCULAR; INTRAVENOUS; SUBCUTANEOUS at 12:01

## 2025-01-21 RX ADMIN — GABAPENTIN 600 MG: 300 CAPSULE ORAL at 08:01

## 2025-01-21 RX ADMIN — GABAPENTIN 600 MG: 300 CAPSULE ORAL at 09:01

## 2025-01-21 RX ADMIN — ACETAMINOPHEN 975 MG: 325 TABLET ORAL at 09:01

## 2025-01-21 RX ADMIN — OXYCODONE HYDROCHLORIDE 15 MG: 10 TABLET ORAL at 08:01

## 2025-01-21 NOTE — PROGRESS NOTES
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 She was recently admitted to [facility] 12/29/24 - 1/5/25 for pain c/w sickle cell pain crises which was refractory to her home regimen of tylenol, oxycodone 15mg, and muscle relaxers. She reported that she moved to the area about 6 weeks ago after living in [place]. She was receiving chronic exchange transfusions there but is not currently receiving them as part of her therapy here. She is seeing a hematologist. She also reports that she previously was on MS contin, pen VK, and hydroxurea as part of her home regimen but has not been put back on those since she has moved here. Of note, during that recent admission, the provider was informed by nursing that the patient asked if she could be given a little more medicine than what was prescribed stating "no one has to know". She also asked if nursing needed some help to throw away an empty flush syringe and an empty med syringe. At that time, she was prescribed Dilaudid 3mg IV q3h PRN and IVF with improvement in her pain.    In the ED, /99 HR 74 RR 20 sats adequate on ambient air. Tmax 100.9. Labs notable for Hgb 9.8 and K 2.9. LA wnl. COVID, Flu, and RSV negative. Blood cultures were obtained. UA was ordered. CXR showed . Received Dilaudid 3mg IV in total, Tylenol 1g, Benadryl 25mg PO, Zofran 4mg IV, " and potassium 25mEq PO.    The patient was admitted to Hospital Medicine for further workup and management.    Overview/Hospital Course:  44-year-old female with sickle cell beta thalassemia zero, multiple pulmonary emboli on chronic AC, asthma, and HTN who was admitted for acute sickle cell pain crisis.Initiated on oxy/dilaudid/benadryl prn. Blood cultures positive for CONS and due to port ID was consulted and recommended port removal with IV vanc. Recommended course until 1/19/25. Patient wanting new port during hospitalization. Discussed with IR after cultures negative at 48 hours and decision made for outpatient port. Wean dilaudid as tolerated. U/S LUE revealed occlusive thrombus of L cephalic vein and partial occlusive thrombus of L IJ improved from previous. Heat compresses and prn pain control as needed. Patient resistant to weaning down on pain meds today stated she will try to wean down starting tomrrow    Interval hx:  No events overnight.  Is still having pain.  Discussed that PCA is standard of care and she is agreeable to switch.  Also having some left arm pain from DVT.    ROS   Constitutional: Negative for chills, fatigue, fever.   HENT: Negative for sore throat, trouble swallowing.    Eyes: Negative for photophobia, visual disturbance.   Respiratory: Negative for cough, shortness of breath.    Cardiovascular: Negative for chest pain, palpitations, leg swelling.   Gastrointestinal: Negative for abdominal pain, constipation, diarrhea, nausea, vomiting.   Endocrine: Negative for cold intolerance, heat intolerance.   Genitourinary: Negative for dysuria, frequency.   Musculoskeletal: Negative for arthralgias, myalgias.   Skin: Negative for rash, wound, erythema   Neurological: Negative for dizziness, syncope, weakness, light-headedness.   Psychiatric/Behavioral: Negative for confusion, hallucinations, anxiety  All other systems reviewed and are negative.    PEx   Temp:  [97.7 °F (36.5 °C)-98.7 °F (37.1  "°C)]   Pulse:  [83-89]   Resp:  [14-20]   BP: (109-166)/(77-95)   SpO2:  [94 %-99 %]      I & O (Last 24H):   No intake or output data in the 24 hours ending 01/21/25 1333    General appearance: no distress, alert and oriented x 3  HEENT:  conjunctivae/corneas clear, PERRL, mucous membranes moist   Neck: supple, thyroid not enlarged  Pulm:   normal respiratory effort, CTAB, no wheezes, rales or rhonchi, on RA  Card: RRR, S1, S2 normal, no murmur, click, rub or gallop, no JVD  Abd: soft, NT, ND, BS present; no masses, no organomegaly, obese  Ext: LUE edema  Pulses: 2+, symmetric  Skin: color, texture, turgor normal. No rashes or lesions  Neuro: CN II-XII grossly intact, no focal numbness or weakness, normal strength and tone   Psych: normal mood and affect      No results found for this or any previous visit (from the past 24 hours).    No results for input(s): "POCTGLUCOSE" in the last 168 hours.    Hemoglobin A1C   Date Value Ref Range Status   12/22/2020 4.8 4.0 - 5.6 % Final     Comment:     ADA Screening Guidelines:  5.7-6.4%  Consistent with prediabetes  >or=6.5%  Consistent with diabetes  High levels of fetal hemoglobin interfere with the HbA1C  assay. Heterozygous hemoglobin variants (HbS, HgC, etc)do  not significantly interfere with this assay.   However, presence of multiple variants may affect accuracy.     04/25/2019 5.4 4.0 - 5.6 % Final     Comment:     ADA Screening Guidelines:  5.7-6.4%  Consistent with prediabetes  >or=6.5%  Consistent with diabetes  High levels of fetal hemoglobin interfere with the HbA1C  assay. Heterozygous hemoglobin variants (HbS, HgC, etc)do  not significantly interfere with this assay.   However, presence of multiple variants may affect accuracy.          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]  Yes    Superficial venous thrombosis of left arm [I82.612]  Yes    Bacteremia due to Staphylococcus epidermidis [R78.81, " B95.7]  Yes    Chronic prescription opiate use [Z79.891]  Not Applicable     Chronic    Chronic gastritis [K29.50]  Yes     Chronic    Chronic anticoagulation [Z79.01]  Not Applicable     Chronic    History of pulmonary embolism [Z86.711]  Yes     Chronic    Intermittent asthma [J45.20]  Yes     Chronic    Hypertension [I10]  Yes     Chronic      Resolved Hospital Problems    Diagnosis Date Resolved POA    Chest wall pain [R07.89] 01/16/2025 No    Fever [R50.9] 01/16/2025 Yes    Diarrhea [R19.7] 01/16/2025 Yes         acetaminophen  975 mg Oral TID    amLODIPine  10 mg Oral Daily    apixaban  5 mg Oral BID    folic acid  1 mg Oral Daily    gabapentin  600 mg Oral TID    HYDROmorphone  3 mg Intravenous Once    hydroxyurea  1,000 mg Oral Daily    methocarbamoL  1,000 mg Oral TID    pantoprazole  40 mg Oral BID    polyethylene glycol  17 g Oral Daily    senna-docusate 8.6-50 mg  1 tablet Oral BID    sucralfate  1 g Oral QID (AC & HS)       Current Facility-Administered Medications:     albuterol, 2 puff, Inhalation, Q6H PRN    calcium carbonate, 1,000 mg, Oral, TID PRN    dextrose 50%, 12.5 g, Intravenous, PRN    dextrose 50%, 25 g, Intravenous, PRN    diphenhydrAMINE, 25 mg, Intravenous, Q3H PRN    diphenhydrAMINE-zinc acetate 2-0.1%, , Topical (Top), TID PRN    glucagon (human recombinant), 1 mg, Intramuscular, PRN    glucose, 16 g, Oral, PRN    glucose, 24 g, Oral, PRN    melatonin, 6 mg, Oral, Nightly PRN    naloxone, 0.02 mg, Intravenous, PRN    naloxone, 0.2 mg, Intravenous, PRN    ondansetron, 8 mg, Oral, Q8H PRN    oxyCODONE, 15 mg, Oral, Q4H PRN    prochlorperazine, 10 mg, Oral, Q6H PRN    sodium chloride 0.9%, 10 mL, Intravenous, PRN      Assessment and Plan     * 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 She was recently admitted to [facility] 12/29/24 - 1/5/25 for a pain crisis.  CXR without focal consolidation. I am not concerned for acute chest at this time.    Plan  - Multimodal pain regimen: scheduled tylenol, robaxin, and oxycodone PRN   - change to dilaudid PCA, 0.5mg q15 for 2mg total/hr  - Folic acid, hydroxyurea and other home SCD medications reordered  - repeat CXR 1/11/25 without signs of acute chest, atelectasis present .incentive spirometry     Superficial venous thrombosis of left arm    U/S LUE revealed occlusive thrombus of L cephalic vein and partial occlusive thrombus of L IJ improved from previous.   - Heat compresses and prn pain control as needed.     Bacteremia due to Staphylococcus epidermidis  -blood cultures with staph epidermis and another CONS   -due to port placement ID consult placed  -stopped Rocephin and started on Vancomycin   -TTE negative for vegetations   -s/p Port removal with General Surgery 1/12/25  -repeat BC NGTD  -Cleared by Heme Onc and ID as cultures have been negative  -IR consult placed for port, recommended to be done OP  -completed Vancomycin with ALYSE 1/19/25          Chronic prescription opiate use  Uses  tylenol, oxycodone 15mg, and muscle relaxers. Pain has been refractory to these due to reported emesis.    Plan  - See Vaso-occlusive pain      Chronic gastritis  Chronic and stable.    Plan  - Continue home PPI and carafate      Chronic anticoagulation  See History of PE      History of pulmonary embolism  Chronic and stable.    Plan  - Continue home Eliquis        Intermittent asthma  Chronic and stable. Not in respiratory distress.    Plan  - Continue home albuterol PRN      Hypertension  Patient's blood pressure range in the last 24 hours was: BP  Min: 109/77  Max: 166/81.The patient's inpatient anti-hypertensive regimen is listed below:  Current Antihypertensives  amLODIPine tablet 10  mg, Daily, Oral    Plan  - BP is controlled, no changes needed to their regimen      VTE Risk Mitigation (From admission, onward)           Ordered     apixaban tablet 5 mg  2 times daily         01/12/25 1258     IP VTE HIGH RISK PATIENT  Once         01/07/25 2326     Place sequential compression device  Until discontinued         01/07/25 2326                    Discharge Planning   ALYSE: 1/20/2025     Code Status: Full Code   Medical Readiness for Discharge Date:   Discharge Plan A: Home with family   Discharge Delays: None known at this time                    Eveline Gore MD  Department of Hospital Medicine   [facility] S[facility]

## 2025-01-21 NOTE — ASSESSMENT & PLAN NOTE
U/S LUE revealed occlusive thrombus of L cephalic vein and partial occlusive thrombus of L IJ improved from previous.   - Heat compresses and prn pain control as needed.

## 2025-01-21 NOTE — SUBJECTIVE & OBJECTIVE
"Interval hx:  No events overnight.  Is still having pain.  Discussed that PCA is standard of care and she is agreeable to switch.  Also having some left arm pain from DVT.    ROS   Constitutional: Negative for chills, fatigue, fever.   HENT: Negative for sore throat, trouble swallowing.    Eyes: Negative for photophobia, visual disturbance.   Respiratory: Negative for cough, shortness of breath.    Cardiovascular: Negative for chest pain, palpitations, leg swelling.   Gastrointestinal: Negative for abdominal pain, constipation, diarrhea, nausea, vomiting.   Endocrine: Negative for cold intolerance, heat intolerance.   Genitourinary: Negative for dysuria, frequency.   Musculoskeletal: Negative for arthralgias, myalgias.   Skin: Negative for rash, wound, erythema   Neurological: Negative for dizziness, syncope, weakness, light-headedness.   Psychiatric/Behavioral: Negative for confusion, hallucinations, anxiety  All other systems reviewed and are negative.    PEx   Temp:  [97.7 °F (36.5 °C)-98.7 °F (37.1 °C)]   Pulse:  [83-89]   Resp:  [14-20]   BP: (109-166)/(77-95)   SpO2:  [94 %-99 %]      I & O (Last 24H):   No intake or output data in the 24 hours ending 01/21/25 1333    General appearance: no distress, alert and oriented x 3  HEENT:  conjunctivae/corneas clear, PERRL, mucous membranes moist   Neck: supple, thyroid not enlarged  Pulm:   normal respiratory effort, CTAB, no wheezes, rales or rhonchi, on RA  Card: RRR, S1, S2 normal, no murmur, click, rub or gallop, no JVD  Abd: soft, NT, ND, BS present; no masses, no organomegaly, obese  Ext: LUE edema  Pulses: 2+, symmetric  Skin: color, texture, turgor normal. No rashes or lesions  Neuro: CN II-XII grossly intact, no focal numbness or weakness, normal strength and tone   Psych: normal mood and affect      No results found for this or any previous visit (from the past 24 hours).    No results for input(s): "POCTGLUCOSE" in the last 168 hours.    Hemoglobin A1C "   Date Value Ref Range Status   12/22/2020 4.8 4.0 - 5.6 % Final     Comment:     ADA Screening Guidelines:  5.7-6.4%  Consistent with prediabetes  >or=6.5%  Consistent with diabetes  High levels of fetal hemoglobin interfere with the HbA1C  assay. Heterozygous hemoglobin variants (HbS, HgC, etc)do  not significantly interfere with this assay.   However, presence of multiple variants may affect accuracy.     04/25/2019 5.4 4.0 - 5.6 % Final     Comment:     ADA Screening Guidelines:  5.7-6.4%  Consistent with prediabetes  >or=6.5%  Consistent with diabetes  High levels of fetal hemoglobin interfere with the HbA1C  assay. Heterozygous hemoglobin variants (HbS, HgC, etc)do  not significantly interfere with this assay.   However, presence of multiple variants may affect accuracy.          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]  Yes    Superficial venous thrombosis of left arm [I82.612]  Yes    Bacteremia due to Staphylococcus epidermidis [R78.81, B95.7]  Yes    Chronic prescription opiate use [Z79.891]  Not Applicable     Chronic    Chronic gastritis [K29.50]  Yes     Chronic    Chronic anticoagulation [Z79.01]  Not Applicable     Chronic    History of pulmonary embolism [Z86.711]  Yes     Chronic    Intermittent asthma [J45.20]  Yes     Chronic    Hypertension [I10]  Yes     Chronic      Resolved Hospital Problems    Diagnosis Date Resolved POA    Chest wall pain [R07.89] 01/16/2025 No    Fever [R50.9] 01/16/2025 Yes    Diarrhea [R19.7] 01/16/2025 Yes         acetaminophen  975 mg Oral TID    amLODIPine  10 mg Oral Daily    apixaban  5 mg Oral BID    folic acid  1 mg Oral Daily    gabapentin  600 mg Oral TID    HYDROmorphone  3 mg Intravenous Once    hydroxyurea  1,000 mg Oral Daily    methocarbamoL  1,000 mg Oral TID    pantoprazole  40 mg Oral BID    polyethylene glycol  17 g Oral Daily    senna-docusate 8.6-50 mg  1 tablet Oral BID    sucralfate  1 g  Oral QID (AC & HS)       Current Facility-Administered Medications:     albuterol, 2 puff, Inhalation, Q6H PRN    calcium carbonate, 1,000 mg, Oral, TID PRN    dextrose 50%, 12.5 g, Intravenous, PRN    dextrose 50%, 25 g, Intravenous, PRN    diphenhydrAMINE, 25 mg, Intravenous, Q3H PRN    diphenhydrAMINE-zinc acetate 2-0.1%, , Topical (Top), TID PRN    glucagon (human recombinant), 1 mg, Intramuscular, PRN    glucose, 16 g, Oral, PRN    glucose, 24 g, Oral, PRN    melatonin, 6 mg, Oral, Nightly PRN    naloxone, 0.02 mg, Intravenous, PRN    naloxone, 0.2 mg, Intravenous, PRN    ondansetron, 8 mg, Oral, Q8H PRN    oxyCODONE, 15 mg, Oral, Q4H PRN    prochlorperazine, 10 mg, Oral, Q6H PRN    sodium chloride 0.9%, 10 mL, Intravenous, PRN

## 2025-01-21 NOTE — ASSESSMENT & PLAN NOTE
44-year-old female with sickle cell beta thalassemia zero, multiple pulmonary emboli on chronic AC, asthma, and HTN who presented to [facility] ED 1/4/25 for diffuse myalgias and pain consistent with a sickle cell pain crisis as well as fever, nausea, vomiting, and diarrhea for the past day. She was recently admitted to [facility] 12/29/24 - 1/5/25 for a pain crisis.  CXR without focal consolidation. I am not concerned for acute chest at this time.    Plan  - Multimodal pain regimen: scheduled tylenol, robaxin, and oxycodone PRN   - change to dilaudid PCA, 0.5mg q15 for 2mg total/hr  - Folic acid, hydroxyurea and other home SCD medications reordered  - repeat CXR 1/11/25 without signs of acute chest, atelectasis present .incentive spirometry

## 2025-01-21 NOTE — ASSESSMENT & PLAN NOTE
Patient's blood pressure range in the last 24 hours was: BP  Min: 109/77  Max: 166/81.The patient's inpatient anti-hypertensive regimen is listed below:  Current Antihypertensives  amLODIPine tablet 10 mg, Daily, Oral    Plan  - BP is controlled, no changes needed to their regimen

## 2025-01-21 NOTE — PROGRESS NOTES
Spoke to pt re skin integrity, her stay here has been > 7 days I explained I would like to visualize her skin to ensure she does not have altered skin integrity, she declined stating she does not have any skin issues.I educated pt about changing position at least every 2 hours to endure she does not develop any skin issues, she verbalized her understanding.

## 2025-01-22 LAB
ALBUMIN SERPL BCP-MCNC: 3.3 G/DL (ref 3.5–5.2)
ALP SERPL-CCNC: 77 U/L (ref 40–150)
ALT SERPL W/O P-5'-P-CCNC: 13 U/L (ref 10–44)
ANION GAP SERPL CALC-SCNC: 11 MMOL/L (ref 8–16)
AST SERPL-CCNC: 20 U/L (ref 10–40)
BASOPHILS # BLD AUTO: 0.06 K/UL (ref 0–0.2)
BASOPHILS NFR BLD: 0.5 % (ref 0–1.9)
BILIRUB SERPL-MCNC: 0.4 MG/DL (ref 0.1–1)
BUN SERPL-MCNC: 22 MG/DL (ref 6–20)
CALCIUM SERPL-MCNC: 9.3 MG/DL (ref 8.7–10.5)
CHLORIDE SERPL-SCNC: 104 MMOL/L (ref 95–110)
CO2 SERPL-SCNC: 23 MMOL/L (ref 23–29)
CREAT SERPL-MCNC: 1.5 MG/DL (ref 0.5–1.4)
DIFFERENTIAL METHOD BLD: ABNORMAL
EOSINOPHIL # BLD AUTO: 0.5 K/UL (ref 0–0.5)
EOSINOPHIL NFR BLD: 4.5 % (ref 0–8)
ERYTHROCYTE [DISTWIDTH] IN BLOOD BY AUTOMATED COUNT: 23.6 % (ref 11.5–14.5)
EST. GFR  (NO RACE VARIABLE): 43.3 ML/MIN/1.73 M^2
GLUCOSE SERPL-MCNC: 74 MG/DL (ref 70–110)
HCT VFR BLD AUTO: 23.6 % (ref 37–48.5)
HGB BLD-MCNC: 8 G/DL (ref 12–16)
IMM GRANULOCYTES # BLD AUTO: 0.05 K/UL (ref 0–0.04)
IMM GRANULOCYTES NFR BLD AUTO: 0.4 % (ref 0–0.5)
LYMPHOCYTES # BLD AUTO: 2.5 K/UL (ref 1–4.8)
LYMPHOCYTES NFR BLD: 22.3 % (ref 18–48)
MCH RBC QN AUTO: 22.7 PG (ref 27–31)
MCHC RBC AUTO-ENTMCNC: 33.9 G/DL (ref 32–36)
MCV RBC AUTO: 67 FL (ref 82–98)
MONOCYTES # BLD AUTO: 1.1 K/UL (ref 0.3–1)
MONOCYTES NFR BLD: 9.8 % (ref 4–15)
NEUTROPHILS # BLD AUTO: 7 K/UL (ref 1.8–7.7)
NEUTROPHILS NFR BLD: 62.5 % (ref 38–73)
NRBC BLD-RTO: 5 /100 WBC
PLATELET # BLD AUTO: 283 K/UL (ref 150–450)
PMV BLD AUTO: 10.3 FL (ref 9.2–12.9)
POTASSIUM SERPL-SCNC: 4.2 MMOL/L (ref 3.5–5.1)
PROT SERPL-MCNC: 7.3 G/DL (ref 6–8.4)
RBC # BLD AUTO: 3.53 M/UL (ref 4–5.4)
SODIUM SERPL-SCNC: 138 MMOL/L (ref 136–145)
WBC # BLD AUTO: 11.29 K/UL (ref 3.9–12.7)

## 2025-01-22 PROCEDURE — 25000003 PHARM REV CODE 250: Performed by: STUDENT IN AN ORGANIZED HEALTH CARE EDUCATION/TRAINING PROGRAM

## 2025-01-22 PROCEDURE — 99900035 HC TECH TIME PER 15 MIN (STAT)

## 2025-01-22 PROCEDURE — 80053 COMPREHEN METABOLIC PANEL: CPT | Performed by: STUDENT IN AN ORGANIZED HEALTH CARE EDUCATION/TRAINING PROGRAM

## 2025-01-22 PROCEDURE — 25000003 PHARM REV CODE 250: Performed by: HOSPITALIST

## 2025-01-22 PROCEDURE — 63600175 PHARM REV CODE 636 W HCPCS: Performed by: INTERNAL MEDICINE

## 2025-01-22 PROCEDURE — 63600175 PHARM REV CODE 636 W HCPCS: Performed by: STUDENT IN AN ORGANIZED HEALTH CARE EDUCATION/TRAINING PROGRAM

## 2025-01-22 PROCEDURE — 25000003 PHARM REV CODE 250: Performed by: INTERNAL MEDICINE

## 2025-01-22 PROCEDURE — 85025 COMPLETE CBC W/AUTO DIFF WBC: CPT | Performed by: STUDENT IN AN ORGANIZED HEALTH CARE EDUCATION/TRAINING PROGRAM

## 2025-01-22 PROCEDURE — 25000003 PHARM REV CODE 250

## 2025-01-22 PROCEDURE — 36415 COLL VENOUS BLD VENIPUNCTURE: CPT | Performed by: STUDENT IN AN ORGANIZED HEALTH CARE EDUCATION/TRAINING PROGRAM

## 2025-01-22 PROCEDURE — 11000001 HC ACUTE MED/SURG PRIVATE ROOM

## 2025-01-22 RX ORDER — DIPHENHYDRAMINE HCL 50 MG
50 CAPSULE ORAL EVERY 6 HOURS PRN
Status: DISCONTINUED | OUTPATIENT
Start: 2025-01-22 | End: 2025-01-22 | Stop reason: SDUPTHER

## 2025-01-22 RX ORDER — HYDROXYZINE HYDROCHLORIDE 25 MG/1
25 TABLET, FILM COATED ORAL 3 TIMES DAILY PRN
Status: DISCONTINUED | OUTPATIENT
Start: 2025-01-22 | End: 2025-01-22

## 2025-01-22 RX ORDER — HYDROXYZINE HYDROCHLORIDE 25 MG/1
50 TABLET, FILM COATED ORAL 3 TIMES DAILY PRN
Status: DISCONTINUED | OUTPATIENT
Start: 2025-01-22 | End: 2025-01-25

## 2025-01-22 RX ADMIN — PANTOPRAZOLE SODIUM 40 MG: 40 TABLET, DELAYED RELEASE ORAL at 09:01

## 2025-01-22 RX ADMIN — SUCRALFATE 1 G: 1 TABLET ORAL at 05:01

## 2025-01-22 RX ADMIN — SUCRALFATE 1 G: 1 TABLET ORAL at 11:01

## 2025-01-22 RX ADMIN — APIXABAN 5 MG: 5 TABLET, FILM COATED ORAL at 09:01

## 2025-01-22 RX ADMIN — Medication: at 01:01

## 2025-01-22 RX ADMIN — Medication: at 09:01

## 2025-01-22 RX ADMIN — HYDROXYZINE HYDROCHLORIDE 25 MG: 25 TABLET, FILM COATED ORAL at 06:01

## 2025-01-22 RX ADMIN — METHOCARBAMOL 1000 MG: 500 TABLET ORAL at 08:01

## 2025-01-22 RX ADMIN — SODIUM CHLORIDE, POTASSIUM CHLORIDE, SODIUM LACTATE AND CALCIUM CHLORIDE: 600; 310; 30; 20 INJECTION, SOLUTION INTRAVENOUS at 12:01

## 2025-01-22 RX ADMIN — Medication: at 07:01

## 2025-01-22 RX ADMIN — AMLODIPINE BESYLATE 10 MG: 10 TABLET ORAL at 09:01

## 2025-01-22 RX ADMIN — METHOCARBAMOL 1000 MG: 500 TABLET ORAL at 09:01

## 2025-01-22 RX ADMIN — FOLIC ACID 1 MG: 1 TABLET ORAL at 09:01

## 2025-01-22 RX ADMIN — ONDANSETRON 8 MG: 8 TABLET, ORALLY DISINTEGRATING ORAL at 12:01

## 2025-01-22 RX ADMIN — ACETAMINOPHEN 975 MG: 325 TABLET ORAL at 02:01

## 2025-01-22 RX ADMIN — HYDROXYZINE HYDROCHLORIDE 50 MG: 25 TABLET, FILM COATED ORAL at 08:01

## 2025-01-22 RX ADMIN — METHOCARBAMOL 1000 MG: 500 TABLET ORAL at 02:01

## 2025-01-22 RX ADMIN — DIPHENHYDRAMINE HYDROCHLORIDE 25 MG: 50 INJECTION, SOLUTION INTRAMUSCULAR; INTRAVENOUS at 03:01

## 2025-01-22 RX ADMIN — DIPHENHYDRAMINE HYDROCHLORIDE 25 MG: 50 INJECTION, SOLUTION INTRAMUSCULAR; INTRAVENOUS at 10:01

## 2025-01-22 RX ADMIN — GABAPENTIN 600 MG: 300 CAPSULE ORAL at 08:01

## 2025-01-22 RX ADMIN — ACETAMINOPHEN 975 MG: 325 TABLET ORAL at 09:01

## 2025-01-22 RX ADMIN — SUCRALFATE 1 G: 1 TABLET ORAL at 08:01

## 2025-01-22 RX ADMIN — APIXABAN 5 MG: 5 TABLET, FILM COATED ORAL at 08:01

## 2025-01-22 RX ADMIN — SENNOSIDES AND DOCUSATE SODIUM 1 TABLET: 50; 8.6 TABLET ORAL at 08:01

## 2025-01-22 RX ADMIN — SENNOSIDES AND DOCUSATE SODIUM 1 TABLET: 50; 8.6 TABLET ORAL at 09:01

## 2025-01-22 RX ADMIN — DIPHENHYDRAMINE HYDROCHLORIDE 25 MG: 50 INJECTION, SOLUTION INTRAMUSCULAR; INTRAVENOUS at 06:01

## 2025-01-22 RX ADMIN — Medication: at 05:01

## 2025-01-22 RX ADMIN — HYDROXYUREA 1000 MG: 500 CAPSULE ORAL at 09:01

## 2025-01-22 RX ADMIN — POLYETHYLENE GLYCOL 3350 17 G: 17 POWDER, FOR SOLUTION ORAL at 09:01

## 2025-01-22 RX ADMIN — PANTOPRAZOLE SODIUM 40 MG: 40 TABLET, DELAYED RELEASE ORAL at 08:01

## 2025-01-22 RX ADMIN — GABAPENTIN 600 MG: 300 CAPSULE ORAL at 09:01

## 2025-01-22 RX ADMIN — DIPHENHYDRAMINE HYDROCHLORIDE 25 MG: 50 INJECTION, SOLUTION INTRAMUSCULAR; INTRAVENOUS at 12:01

## 2025-01-22 RX ADMIN — GABAPENTIN 600 MG: 300 CAPSULE ORAL at 02:01

## 2025-01-22 RX ADMIN — SUCRALFATE 1 G: 1 TABLET ORAL at 06:01

## 2025-01-22 NOTE — NURSING
0054 am Patient stated that she had one episode of vomiting while eating turkey sandwith . Patient is calm on observation at this time no acute distress observed  . Provided Prn nausea management per placed regimen and relief was obtained as stated by patient . Plan of care reviewed with the patient . Understanding verbalized.No acute distress observed.Safety Maintained.Bed at lowest position.Call bell at reach.Bed side table at reach.Instructed Patient to call for assistance whenever required.

## 2025-01-22 NOTE — RESPIRATORY THERAPY
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    24 Hours Vitals Range:  Temp:  [98 °F (36.7 °C)-99.1 °F (37.3 °C)]   Pulse:  [87-96]   Resp:  [16-20]   BP: ()/(56-85)   SpO2:  [98 %-100 %]     Labs:    Recent Labs     25  0346 25  0530   * 138   K 3.8 4.2    104   CO2 25 23   BUN 18 22*   CREATININE 0.9 1.5*   GLU 76 74        No results for input(s): "PH", "PCO2", "PO2", "HCO3", "POCSATURATED", "BE" in the last 72 hours.    ASSESSMENT/INTERVENTIONS    Pt resting with no respiratory interventions required at this time.     Last VS   Temp: 98.7 °F (37.1 °C) ( 155)  Pulse: 91 (1556)  Resp: 18 (1556)  BP: 118/71 (1556)  SpO2: 99 % (1556)    Level of Consciousness: Level of Consciousness (AVPU): alert  Respiratory Effort:   Expansion/Accessory Muscle Usage:    All Lung Field Breath Sounds: All Lung Fields Breath Sounds: Anterior:, Lateral:, equal bilaterally  Is the ETCO2 monitor on? Yes  Is the patient wearing a cannula? Yes  Are ETCO2 orders placed? Yes  Is the patient on a PCA pump? Yes  ETCO2 monitored: ETCO2 (mmHg): 59 mmHg  Ambu at bedside: y    Active Orders   Respiratory Care    END TIDAL CO2 MONITOR Q12H     Frequency: Q12H     " Number of Occurrences: Until Specified    Oxygen Continuous     Frequency: Continuous     Number of Occurrences: Until Specified     Order Questions:      Device type: Low flow      Device: Nasal Cannula (1- 5 Liters)      LPM: 2      Titrate O2 per Oxygen Titration Protocol: Yes      To maintain SpO2 goal of: >= 90%      Notify MD of: Inability to achieve desired SpO2; Sudden change in patient status and requires 20% increase in FiO2; Patient requires >60% FiO2    Oxygen PRN     Frequency: PRN     Number of Occurrences: Until Specified     Order Questions:      To maintain SpO2 goal of: >= 92%    Pulse Oximetry Continuous     Frequency: Continuous     Number of Occurrences: Until Specified       RECOMMENDATIONS    We recommend: RRT Recs: Continue POC per primary team.    FOLLOW-UP    Please call back the Rapid Response RT, Myles Anthony RRT at x [phone] for any questions or concerns.

## 2025-01-22 NOTE — SUBJECTIVE & OBJECTIVE
Interval hx:  No events overnight.  Feels improved pain with PCA.  Hgb 8.      ROS   Constitutional: Negative for chills, fatigue, fever.   HENT: Negative for sore throat, trouble swallowing.    Eyes: Negative for photophobia, visual disturbance.   Respiratory: Negative for cough, shortness of breath.    Cardiovascular: Negative for chest pain, palpitations, leg swelling.   Gastrointestinal: Negative for abdominal pain, constipation, diarrhea, nausea, vomiting.   Endocrine: Negative for cold intolerance, heat intolerance.   Genitourinary: Negative for dysuria, frequency.   Musculoskeletal: Negative for arthralgias, myalgias.   Skin: Negative for rash, wound, erythema   Neurological: Negative for dizziness, syncope, weakness, light-headedness.   Psychiatric/Behavioral: Negative for confusion, hallucinations, anxiety  All other systems reviewed and are negative.    PEx   Temp:  [98 °F (36.7 °C)-99.1 °F (37.3 °C)]   Pulse:  [87-96]   Resp:  [16-20]   BP: ()/(56-85)   SpO2:  [94 %-100 %]      I & O (Last 24H):   No intake or output data in the 24 hours ending 01/22/25 1157    General appearance: no distress, alert and oriented x 3  HEENT:  conjunctivae/corneas clear, PERRL, mucous membranes moist   Neck: supple, thyroid not enlarged  Pulm:   normal respiratory effort, CTAB, no wheezes, rales or rhonchi, on RA  Card: RRR, S1, S2 normal, no murmur, click, rub or gallop, no JVD  Abd: soft, NT, ND, BS present; no masses, no organomegaly, obese  Ext: LUE edema  Pulses: 2+, symmetric  Skin: color, texture, turgor normal. No rashes or lesions  Neuro: CN II-XII grossly intact, no focal numbness or weakness, normal strength and tone   Psych: normal mood and affect      Recent Results (from the past 24 hours)   CBC Auto Differential    Collection Time: 01/22/25  5:30 AM   Result Value Ref Range    WBC 11.29 3.90 - 12.70 K/uL    RBC 3.53 (L) 4.00 - 5.40 M/uL    Hemoglobin 8.0 (L) 12.0 - 16.0 g/dL    Hematocrit 23.6 (L) 37.0  "- 48.5 %    MCV 67 (L) 82 - 98 fL    MCH 22.7 (L) 27.0 - 31.0 pg    MCHC 33.9 32.0 - 36.0 g/dL    RDW 23.6 (H) 11.5 - 14.5 %    Platelets 283 150 - 450 K/uL    MPV 10.3 9.2 - 12.9 fL    Immature Granulocytes 0.4 0.0 - 0.5 %    Gran # (ANC) 7.0 1.8 - 7.7 K/uL    Immature Grans (Abs) 0.05 (H) 0.00 - 0.04 K/uL    Lymph # 2.5 1.0 - 4.8 K/uL    Mono # 1.1 (H) 0.3 - 1.0 K/uL    Eos # 0.5 0.0 - 0.5 K/uL    Baso # 0.06 0.00 - 0.20 K/uL    nRBC 5 (A) 0 /100 WBC    Gran % 62.5 38.0 - 73.0 %    Lymph % 22.3 18.0 - 48.0 %    Mono % 9.8 4.0 - 15.0 %    Eosinophil % 4.5 0.0 - 8.0 %    Basophil % 0.5 0.0 - 1.9 %    Differential Method Automated    Comprehensive Metabolic Panel    Collection Time: 01/22/25  5:30 AM   Result Value Ref Range    Sodium 138 136 - 145 mmol/L    Potassium 4.2 3.5 - 5.1 mmol/L    Chloride 104 95 - 110 mmol/L    CO2 23 23 - 29 mmol/L    Glucose 74 70 - 110 mg/dL    BUN 22 (H) 6 - 20 mg/dL    Creatinine 1.5 (H) 0.5 - 1.4 mg/dL    Calcium 9.3 8.7 - 10.5 mg/dL    Total Protein 7.3 6.0 - 8.4 g/dL    Albumin 3.3 (L) 3.5 - 5.2 g/dL    Total Bilirubin 0.4 0.1 - 1.0 mg/dL    Alkaline Phosphatase 77 40 - 150 U/L    AST 20 10 - 40 U/L    ALT 13 10 - 44 U/L    eGFR 43.3 (A) >60 mL/min/1.73 m^2    Anion Gap 11 8 - 16 mmol/L       No results for input(s): "POCTGLUCOSE" in the last 168 hours.    Hemoglobin A1C   Date Value Ref Range Status   12/22/2020 4.8 4.0 - 5.6 % Final     Comment:     ADA Screening Guidelines:  5.7-6.4%  Consistent with prediabetes  >or=6.5%  Consistent with diabetes  High levels of fetal hemoglobin interfere with the HbA1C  assay. Heterozygous hemoglobin variants (HbS, HgC, etc)do  not significantly interfere with this assay.   However, presence of multiple variants may affect accuracy.     04/25/2019 5.4 4.0 - 5.6 % Final     Comment:     ADA Screening Guidelines:  5.7-6.4%  Consistent with prediabetes  >or=6.5%  Consistent with diabetes  High levels of fetal hemoglobin interfere with the " HbA1C  assay. Heterozygous hemoglobin variants (HbS, HgC, etc)do  not significantly interfere with this assay.   However, presence of multiple variants may affect accuracy.          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]  Yes    Superficial venous thrombosis of left arm [I82.612]  Yes    Bacteremia due to Staphylococcus epidermidis [R78.81, B95.7]  Yes    Chronic prescription opiate use [Z79.891]  Not Applicable     Chronic    Chronic gastritis [K29.50]  Yes     Chronic    Chronic anticoagulation [Z79.01]  Not Applicable     Chronic    History of pulmonary embolism [Z86.711]  Yes     Chronic    Intermittent asthma [J45.20]  Yes     Chronic    Hypertension [I10]  Yes     Chronic      Resolved Hospital Problems    Diagnosis Date Resolved POA    Chest wall pain [R07.89] 01/16/2025 No    Fever [R50.9] 01/16/2025 Yes    Diarrhea [R19.7] 01/16/2025 Yes         acetaminophen  975 mg Oral TID    amLODIPine  10 mg Oral Daily    apixaban  5 mg Oral BID    folic acid  1 mg Oral Daily    gabapentin  600 mg Oral TID    hydroxyurea  1,000 mg Oral Daily    methocarbamoL  1,000 mg Oral TID    pantoprazole  40 mg Oral BID    polyethylene glycol  17 g Oral Daily    senna-docusate 8.6-50 mg  1 tablet Oral BID    sucralfate  1 g Oral QID (AC & HS)       Current Facility-Administered Medications:     albuterol, 2 puff, Inhalation, Q6H PRN    calcium carbonate, 1,000 mg, Oral, TID PRN    dextrose 50%, 12.5 g, Intravenous, PRN    dextrose 50%, 25 g, Intravenous, PRN    diphenhydrAMINE, 25 mg, Intravenous, Q3H PRN    diphenhydrAMINE-zinc acetate 2-0.1%, , Topical (Top), TID PRN    glucagon (human recombinant), 1 mg, Intramuscular, PRN    glucose, 16 g, Oral, PRN    glucose, 24 g, Oral, PRN    melatonin, 6 mg, Oral, Nightly PRN    naloxone, 0.02 mg, Intravenous, PRN    naloxone, 0.2 mg, Intravenous, PRN    ondansetron, 8 mg, Oral, Q8H PRN    oxyCODONE, 15 mg, Oral, Q4H PRN     prochlorperazine, 10 mg, Oral, Q6H PRN    sodium chloride 0.9%, 10 mL, Intravenous, PRN

## 2025-01-22 NOTE — PROGRESS NOTES
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 [place] [facility] [place]/5/25 for pain c/w sickle cell pain crises which was refractory to her home regimen of tylenol, oxycodone 15mg, and muscle relaxers. She reported that she moved to the area about 6 weeks ago after living in [place]. She was receiving chronic exchange transfusions there but is not currently receiving them as part of her therapy here. She is seeing a hematologist. She also reports that she previously was on MS contin, pen VK, and hydroxurea as part of her home regimen but has not been put back on those since she has moved here. Of note, during that recent admission, the provider was informed by nursing that the patient asked if she could be given a little more medicine than what was prescribed stating "no one has to know". She also asked if nursing needed some help to throw away an empty flush syringe and an empty med syringe. At that time, she was prescribed Dilaudid 3mg IV q3h PRN and IVF with improvement in her pain.    In the ED, /99 HR 74 RR 20 sats adequate on ambient air. Tmax 100.9. Labs notable for Hgb 9.8 and K 2.9. LA wnl. COVID, Flu, and RSV negative. Blood cultures were obtained. UA was ordered. CXR showed . Received Dilaudid 3mg IV in total, Tylenol 1g, Benadryl 25mg PO, Zofran 4mg IV, " and potassium 25mEq PO.    The patient was admitted to Hospital Medicine for further workup and management.    Overview/Hospital Course:  44-year-old female with sickle cell beta thalassemia zero, multiple pulmonary emboli on chronic AC, asthma, and HTN who was admitted for acute sickle cell pain crisis.Initiated on oxy/dilaudid/benadryl prn. Blood cultures positive for CONS and due to port ID was consulted and recommended port removal with IV vanc. Recommended course until 1/19/25. Patient wanting new port during hospitalization. Discussed with IR after cultures negative at 48 hours and decision made for outpatient port. Wean dilaudid as tolerated. U/S LUE revealed occlusive thrombus of L cephalic vein and partial occlusive thrombus of L IJ improved from previous. Heat compresses and prn pain control as needed. Patient resistant to weaning down on pain meds today stated she will try to wean down starting tomrrow    Interval hx:  No events overnight.  Feels improved pain with PCA.  Hgb 8.      ROS   Constitutional: Negative for chills, fatigue, fever.   HENT: Negative for sore throat, trouble swallowing.    Eyes: Negative for photophobia, visual disturbance.   Respiratory: Negative for cough, shortness of breath.    Cardiovascular: Negative for chest pain, palpitations, leg swelling.   Gastrointestinal: Negative for abdominal pain, constipation, diarrhea, nausea, vomiting.   Endocrine: Negative for cold intolerance, heat intolerance.   Genitourinary: Negative for dysuria, frequency.   Musculoskeletal: Negative for arthralgias, myalgias.   Skin: Negative for rash, wound, erythema   Neurological: Negative for dizziness, syncope, weakness, light-headedness.   Psychiatric/Behavioral: Negative for confusion, hallucinations, anxiety  All other systems reviewed and are negative.    PEx   Temp:  [98 °F (36.7 °C)-99.1 °F (37.3 °C)]   Pulse:  [87-96]   Resp:  [16-20]   BP: ()/(56-85)   SpO2:  [94 %-100 %]      I & O  (Last 24H):   No intake or output data in the 24 hours ending 01/22/25 1157    General appearance: no distress, alert and oriented x 3  HEENT:  conjunctivae/corneas clear, PERRL, mucous membranes moist   Neck: supple, thyroid not enlarged  Pulm:   normal respiratory effort, CTAB, no wheezes, rales or rhonchi, on RA  Card: RRR, S1, S2 normal, no murmur, click, rub or gallop, no JVD  Abd: soft, NT, ND, BS present; no masses, no organomegaly, obese  Ext: LUE edema  Pulses: 2+, symmetric  Skin: color, texture, turgor normal. No rashes or lesions  Neuro: CN II-XII grossly intact, no focal numbness or weakness, normal strength and tone   Psych: normal mood and affect      Recent Results (from the past 24 hours)   CBC Auto Differential    Collection Time: 01/22/25  5:30 AM   Result Value Ref Range    WBC 11.29 3.90 - 12.70 K/uL    RBC 3.53 (L) 4.00 - 5.40 M/uL    Hemoglobin 8.0 (L) 12.0 - 16.0 g/dL    Hematocrit 23.6 (L) 37.0 - 48.5 %    MCV 67 (L) 82 - 98 fL    MCH 22.7 (L) 27.0 - 31.0 pg    MCHC 33.9 32.0 - 36.0 g/dL    RDW 23.6 (H) 11.5 - 14.5 %    Platelets 283 150 - 450 K/uL    MPV 10.3 9.2 - 12.9 fL    Immature Granulocytes 0.4 0.0 - 0.5 %    Gran # (ANC) 7.0 1.8 - 7.7 K/uL    Immature Grans (Abs) 0.05 (H) 0.00 - 0.04 K/uL    Lymph # 2.5 1.0 - 4.8 K/uL    Mono # 1.1 (H) 0.3 - 1.0 K/uL    Eos # 0.5 0.0 - 0.5 K/uL    Baso # 0.06 0.00 - 0.20 K/uL    nRBC 5 (A) 0 /100 WBC    Gran % 62.5 38.0 - 73.0 %    Lymph % 22.3 18.0 - 48.0 %    Mono % 9.8 4.0 - 15.0 %    Eosinophil % 4.5 0.0 - 8.0 %    Basophil % 0.5 0.0 - 1.9 %    Differential Method Automated    Comprehensive Metabolic Panel    Collection Time: 01/22/25  5:30 AM   Result Value Ref Range    Sodium 138 136 - 145 mmol/L    Potassium 4.2 3.5 - 5.1 mmol/L    Chloride 104 95 - 110 mmol/L    CO2 23 23 - 29 mmol/L    Glucose 74 70 - 110 mg/dL    BUN 22 (H) 6 - 20 mg/dL    Creatinine 1.5 (H) 0.5 - 1.4 mg/dL    Calcium 9.3 8.7 - 10.5 mg/dL    Total Protein 7.3 6.0 - 8.4  "g/dL    Albumin 3.3 (L) 3.5 - 5.2 g/dL    Total Bilirubin 0.4 0.1 - 1.0 mg/dL    Alkaline Phosphatase 77 40 - 150 U/L    AST 20 10 - 40 U/L    ALT 13 10 - 44 U/L    eGFR 43.3 (A) >60 mL/min/1.73 m^2    Anion Gap 11 8 - 16 mmol/L       No results for input(s): "POCTGLUCOSE" in the last 168 hours.    Hemoglobin A1C   Date Value Ref Range Status   12/22/2020 4.8 4.0 - 5.6 % Final     Comment:     ADA Screening Guidelines:  5.7-6.4%  Consistent with prediabetes  >or=6.5%  Consistent with diabetes  High levels of fetal hemoglobin interfere with the HbA1C  assay. Heterozygous hemoglobin variants (HbS, HgC, etc)do  not significantly interfere with this assay.   However, presence of multiple variants may affect accuracy.     04/25/2019 5.4 4.0 - 5.6 % Final     Comment:     ADA Screening Guidelines:  5.7-6.4%  Consistent with prediabetes  >or=6.5%  Consistent with diabetes  High levels of fetal hemoglobin interfere with the HbA1C  assay. Heterozygous hemoglobin variants (HbS, HgC, etc)do  not significantly interfere with this assay.   However, presence of multiple variants may affect accuracy.          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]  Yes    Superficial venous thrombosis of left arm [I82.612]  Yes    Bacteremia due to Staphylococcus epidermidis [R78.81, B95.7]  Yes    Chronic prescription opiate use [Z79.891]  Not Applicable     Chronic    Chronic gastritis [K29.50]  Yes     Chronic    Chronic anticoagulation [Z79.01]  Not Applicable     Chronic    History of pulmonary embolism [Z86.711]  Yes     Chronic    Intermittent asthma [J45.20]  Yes     Chronic    Hypertension [I10]  Yes     Chronic      Resolved Hospital Problems    Diagnosis Date Resolved POA    Chest wall pain [R07.89] 01/16/2025 No    Fever [R50.9] 01/16/2025 Yes    Diarrhea [R19.7] 01/16/2025 Yes         acetaminophen  975 mg Oral TID    amLODIPine  10 mg Oral Daily    apixaban  5 mg Oral " BID    folic acid  1 mg Oral Daily    gabapentin  600 mg Oral TID    hydroxyurea  1,000 mg Oral Daily    methocarbamoL  1,000 mg Oral TID    pantoprazole  40 mg Oral BID    polyethylene glycol  17 g Oral Daily    senna-docusate 8.6-50 mg  1 tablet Oral BID    sucralfate  1 g Oral QID (AC & HS)       Current Facility-Administered Medications:     albuterol, 2 puff, Inhalation, Q6H PRN    calcium carbonate, 1,000 mg, Oral, TID PRN    dextrose 50%, 12.5 g, Intravenous, PRN    dextrose 50%, 25 g, Intravenous, PRN    diphenhydrAMINE, 25 mg, Intravenous, Q3H PRN    diphenhydrAMINE-zinc acetate 2-0.1%, , Topical (Top), TID PRN    glucagon (human recombinant), 1 mg, Intramuscular, PRN    glucose, 16 g, Oral, PRN    glucose, 24 g, Oral, PRN    melatonin, 6 mg, Oral, Nightly PRN    naloxone, 0.02 mg, Intravenous, PRN    naloxone, 0.2 mg, Intravenous, PRN    ondansetron, 8 mg, Oral, Q8H PRN    oxyCODONE, 15 mg, Oral, Q4H PRN    prochlorperazine, 10 mg, Oral, Q6H PRN    sodium chloride 0.9%, 10 mL, Intravenous, PRN      Assessment and Plan     * 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 She was recently admitted to [facility] [place] 12/29/24 - 1/5/25 for a pain crisis.  CXR without focal consolidation. I am not concerned for acute chest at this time.    Plan  - Multimodal pain regimen: scheduled tylenol, robaxin, and oxycodone PRN   - change to dilaudid PCA, increase to 0.6mg q15 for 2.4mg total/hr  - Folic acid, hydroxyurea and other home SCD medications reordered  - repeat CXR 1/11/25 without signs of acute chest, atelectasis present .incentive spirometry     Superficial venous thrombosis of left arm    U/S LUE revealed occlusive thrombus of L cephalic  vein and partial occlusive thrombus of L IJ improved from previous.   - Heat compresses and prn pain control as needed.     Bacteremia due to Staphylococcus epidermidis  -blood cultures with staph epidermis and another CONS   -due to port placement ID consult placed  -stopped Rocephin and started on Vancomycin   -TTE negative for vegetations   -s/p Port removal with General Surgery 1/12/25  -repeat BC NGTD  -Cleared by Heme Onc and ID as cultures have been negative  -IR consult placed for port, recommended to be done OP  -completed Vancomycin with ALYSE 1/19/25          Chronic prescription opiate use  Uses  tylenol, oxycodone 15mg, and muscle relaxers. Pain has been refractory to these due to reported emesis.    Plan  - See Vaso-occlusive pain      Chronic gastritis  Chronic and stable.    Plan  - Continue home PPI and carafate      Chronic anticoagulation  See History of PE      History of pulmonary embolism  Chronic and stable.    Plan  - Continue home Eliquis        Intermittent asthma  Chronic and stable. Not in respiratory distress.    Plan  - Continue home albuterol PRN      Hypertension  Patient's blood pressure range in the last 24 hours was: BP  Min: 109/77  Max: 166/81.The patient's inpatient anti-hypertensive regimen is listed below:  Current Antihypertensives  amLODIPine tablet 10 mg, Daily, Oral    Plan  - BP is controlled, no changes needed to their regimen      VTE Risk Mitigation (From admission, onward)           Ordered     apixaban tablet 5 mg  2 times daily         01/12/25 1258     IP VTE HIGH RISK PATIENT  Once         01/07/25 2326     Place sequential compression device  Until discontinued         01/07/25 2326                    Discharge Planning   ALYSE: 1/23/2025     Code Status: Full Code   Medical Readiness for Discharge Date:   Discharge Plan A: Home with family   Discharge Delays: None known at this time                    Eveline Gore MD  Department of Hospital Medicine   [facility] -  Med Surg

## 2025-01-22 NOTE — ASSESSMENT & PLAN NOTE
44-year-old female with sickle cell beta thalassemia zero, multiple pulmonary emboli on chronic AC, asthma, and HTN who presented to [facility] ED 1/4/25 for diffuse myalgias and pain consistent with a sickle cell pain crisis as well as fever, nausea, vomiting, and diarrhea for the past day. She was recently admitted to [facility] 12/29/24 - 1/5/25 for a pain crisis.  CXR without focal consolidation. I am not concerned for acute chest at this time.    Plan  - Multimodal pain regimen: scheduled tylenol, robaxin, and oxycodone PRN   - change to dilaudid PCA, increase to 0.6mg q15 for 2.4mg total/hr  - Folic acid, hydroxyurea and other home SCD medications reordered  - repeat CXR 1/11/25 without signs of acute chest, atelectasis present .incentive spirometry    no

## 2025-01-23 PROCEDURE — 11000001 HC ACUTE MED/SURG PRIVATE ROOM

## 2025-01-23 PROCEDURE — 94761 N-INVAS EAR/PLS OXIMETRY MLT: CPT

## 2025-01-23 PROCEDURE — 25000003 PHARM REV CODE 250: Performed by: STUDENT IN AN ORGANIZED HEALTH CARE EDUCATION/TRAINING PROGRAM

## 2025-01-23 PROCEDURE — 27000221 HC OXYGEN, UP TO 24 HOURS

## 2025-01-23 PROCEDURE — 99900035 HC TECH TIME PER 15 MIN (STAT)

## 2025-01-23 PROCEDURE — 25000003 PHARM REV CODE 250

## 2025-01-23 PROCEDURE — 25000003 PHARM REV CODE 250: Performed by: INTERNAL MEDICINE

## 2025-01-23 PROCEDURE — 63600175 PHARM REV CODE 636 W HCPCS: Performed by: INTERNAL MEDICINE

## 2025-01-23 RX ORDER — HYDROXYUREA 500 MG/1
1000 CAPSULE ORAL DAILY
Qty: 60 CAPSULE | Refills: 0 | Status: ON HOLD | OUTPATIENT
Start: 2025-01-23

## 2025-01-23 RX ORDER — MORPHINE SULFATE 30 MG/1
30 TABLET, FILM COATED, EXTENDED RELEASE ORAL EVERY 12 HOURS
Qty: 14 TABLET | Refills: 0 | Status: SHIPPED | OUTPATIENT
Start: 2025-01-23 | End: 2025-01-25

## 2025-01-23 RX ORDER — OXYCODONE HYDROCHLORIDE 15 MG/1
15 TABLET ORAL EVERY 4 HOURS PRN
Qty: 42 TABLET | Refills: 0 | Status: SHIPPED | OUTPATIENT
Start: 2025-01-23 | End: 2025-01-29 | Stop reason: HOSPADM

## 2025-01-23 RX ORDER — LACTULOSE 10 G/15ML
15 SOLUTION ORAL EVERY 6 HOURS PRN
Status: DISCONTINUED | OUTPATIENT
Start: 2025-01-23 | End: 2025-01-25 | Stop reason: HOSPADM

## 2025-01-23 RX ADMIN — SUCRALFATE 1 G: 1 TABLET ORAL at 04:01

## 2025-01-23 RX ADMIN — SODIUM CHLORIDE, POTASSIUM CHLORIDE, SODIUM LACTATE AND CALCIUM CHLORIDE: 600; 310; 30; 20 INJECTION, SOLUTION INTRAVENOUS at 02:01

## 2025-01-23 RX ADMIN — Medication: at 08:01

## 2025-01-23 RX ADMIN — SENNOSIDES AND DOCUSATE SODIUM 1 TABLET: 50; 8.6 TABLET ORAL at 08:01

## 2025-01-23 RX ADMIN — APIXABAN 5 MG: 5 TABLET, FILM COATED ORAL at 08:01

## 2025-01-23 RX ADMIN — METHOCARBAMOL 1000 MG: 500 TABLET ORAL at 08:01

## 2025-01-23 RX ADMIN — PANTOPRAZOLE SODIUM 40 MG: 40 TABLET, DELAYED RELEASE ORAL at 08:01

## 2025-01-23 RX ADMIN — GABAPENTIN 600 MG: 300 CAPSULE ORAL at 08:01

## 2025-01-23 RX ADMIN — ACETAMINOPHEN 975 MG: 325 TABLET ORAL at 08:01

## 2025-01-23 RX ADMIN — AMLODIPINE BESYLATE 10 MG: 10 TABLET ORAL at 08:01

## 2025-01-23 RX ADMIN — Medication: at 11:01

## 2025-01-23 RX ADMIN — HYDROXYUREA 1000 MG: 500 CAPSULE ORAL at 08:01

## 2025-01-23 RX ADMIN — ACETAMINOPHEN 975 MG: 325 TABLET ORAL at 02:01

## 2025-01-23 RX ADMIN — POLYETHYLENE GLYCOL 3350 17 G: 17 POWDER, FOR SOLUTION ORAL at 08:01

## 2025-01-23 RX ADMIN — GABAPENTIN 600 MG: 300 CAPSULE ORAL at 02:01

## 2025-01-23 RX ADMIN — SUCRALFATE 1 G: 1 TABLET ORAL at 08:01

## 2025-01-23 RX ADMIN — SUCRALFATE 1 G: 1 TABLET ORAL at 05:01

## 2025-01-23 RX ADMIN — FOLIC ACID 1 MG: 1 TABLET ORAL at 08:01

## 2025-01-23 RX ADMIN — LACTULOSE 15 G: 20 SOLUTION ORAL at 08:01

## 2025-01-23 RX ADMIN — METHOCARBAMOL 1000 MG: 500 TABLET ORAL at 02:01

## 2025-01-23 RX ADMIN — Medication: at 02:01

## 2025-01-23 RX ADMIN — SUCRALFATE 1 G: 1 TABLET ORAL at 11:01

## 2025-01-23 RX ADMIN — Medication: at 05:01

## 2025-01-23 NOTE — ASSESSMENT & PLAN NOTE
44-year-old female with sickle cell beta thalassemia zero, multiple pulmonary emboli on chronic AC, asthma, and HTN who presented to [facility] ED 1/4/25 for diffuse myalgias and pain consistent with a sickle cell pain crisis as well as fever, nausea, vomiting, and diarrhea for the past day. She was recently admitted to [facility] 12/29/24 - 1/5/25 for a pain crisis.  CXR without focal consolidation. I am not concerned for acute chest at this time.    Plan  - Multimodal pain regimen: scheduled tylenol, robaxin, and oxycodone PRN   - change to dilaudid PCA, increased to 0.6mg q15 for total 2.4mg/hr, continue   - Folic acid, hydroxyurea and other home SCD medications reordered  - repeat CXR 1/11/25 without signs of acute chest, atelectasis add incentive spirometry

## 2025-01-23 NOTE — SUBJECTIVE & OBJECTIVE
"Interval hx:  No events overnight.  Feels improved pain with PCA, would like to continue current dose.  Hgb 8.  Hopeful to go home in next 1-2 days.        ROS   Constitutional: Negative for chills, fatigue, fever.   HENT: Negative for sore throat, trouble swallowing.    Eyes: Negative for photophobia, visual disturbance.   Respiratory: Negative for cough, shortness of breath.    Cardiovascular: Negative for chest pain, palpitations, leg swelling.   Gastrointestinal: Negative for abdominal pain, constipation, diarrhea, nausea, vomiting.   Endocrine: Negative for cold intolerance, heat intolerance.   Genitourinary: Negative for dysuria, frequency.   Musculoskeletal: Negative for arthralgias, myalgias.   Skin: Negative for rash, wound, erythema   Neurological: Negative for dizziness, syncope, weakness, light-headedness.   Psychiatric/Behavioral: Negative for confusion, hallucinations, anxiety  All other systems reviewed and are negative.    PEx   Temp:  [98.2 °F (36.8 °C)-99.1 °F (37.3 °C)]   Pulse:  [83-94]   Resp:  [16-18]   BP: (110-127)/(60-84)   SpO2:  [99 %-100 %]      I & O (Last 24H):   No intake or output data in the 24 hours ending 01/23/25 1218    General appearance: no distress, alert and oriented x 3  HEENT:  conjunctivae/corneas clear, PERRL, mucous membranes moist   Neck: supple, thyroid not enlarged  Pulm:   normal respiratory effort, CTAB, no wheezes, rales or rhonchi, on RA  Card: RRR, S1, S2 normal, no murmur, click, rub or gallop, no JVD  Abd: soft, NT, ND, BS present; no masses, no organomegaly, obese  Ext: LUE edema  Pulses: 2+, symmetric  Skin: color, texture, turgor normal. No rashes or lesions  Neuro: CN II-XII grossly intact, no focal numbness or weakness, normal strength and tone   Psych: normal mood and affect      No results found for this or any previous visit (from the past 24 hours).      No results for input(s): "POCTGLUCOSE" in the last 168 hours.    Hemoglobin A1C   Date Value Ref " Range Status   12/22/2020 4.8 4.0 - 5.6 % Final     Comment:     ADA Screening Guidelines:  5.7-6.4%  Consistent with prediabetes  >or=6.5%  Consistent with diabetes  High levels of fetal hemoglobin interfere with the HbA1C  assay. Heterozygous hemoglobin variants (HbS, HgC, etc)do  not significantly interfere with this assay.   However, presence of multiple variants may affect accuracy.     04/25/2019 5.4 4.0 - 5.6 % Final     Comment:     ADA Screening Guidelines:  5.7-6.4%  Consistent with prediabetes  >or=6.5%  Consistent with diabetes  High levels of fetal hemoglobin interfere with the HbA1C  assay. Heterozygous hemoglobin variants (HbS, HgC, etc)do  not significantly interfere with this assay.   However, presence of multiple variants may affect accuracy.          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]  Yes    Superficial venous thrombosis of left arm [I82.612]  Yes    Bacteremia due to Staphylococcus epidermidis [R78.81, B95.7]  Yes    Chronic prescription opiate use [Z79.891]  Not Applicable     Chronic    Chronic gastritis [K29.50]  Yes     Chronic    Chronic anticoagulation [Z79.01]  Not Applicable     Chronic    History of pulmonary embolism [Z86.711]  Yes     Chronic    Intermittent asthma [J45.20]  Yes     Chronic    Hypertension [I10]  Yes     Chronic      Resolved Hospital Problems    Diagnosis Date Resolved POA    Chest wall pain [R07.89] 01/16/2025 No    Fever [R50.9] 01/16/2025 Yes    Diarrhea [R19.7] 01/16/2025 Yes         acetaminophen  975 mg Oral TID    amLODIPine  10 mg Oral Daily    apixaban  5 mg Oral BID    folic acid  1 mg Oral Daily    gabapentin  600 mg Oral TID    hydroxyurea  1,000 mg Oral Daily    methocarbamoL  1,000 mg Oral TID    pantoprazole  40 mg Oral BID    polyethylene glycol  17 g Oral Daily    senna-docusate 8.6-50 mg  1 tablet Oral BID    sucralfate  1 g Oral QID (AC & HS)       Current Facility-Administered  Medications:     albuterol, 2 puff, Inhalation, Q6H PRN    calcium carbonate, 1,000 mg, Oral, TID PRN    dextrose 50%, 12.5 g, Intravenous, PRN    dextrose 50%, 25 g, Intravenous, PRN    diphenhydrAMINE-zinc acetate 2-0.1%, , Topical (Top), TID PRN    glucagon (human recombinant), 1 mg, Intramuscular, PRN    glucose, 16 g, Oral, PRN    glucose, 24 g, Oral, PRN    hydrOXYzine HCL, 50 mg, Oral, TID PRN    melatonin, 6 mg, Oral, Nightly PRN    naloxone, 0.02 mg, Intravenous, PRN    naloxone, 0.2 mg, Intravenous, PRN    ondansetron, 8 mg, Oral, Q8H PRN    oxyCODONE, 15 mg, Oral, Q4H PRN    prochlorperazine, 10 mg, Oral, Q6H PRN    sodium chloride 0.9%, 10 mL, Intravenous, PRN

## 2025-01-23 NOTE — RESPIRATORY THERAPY
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    24 Hours Vitals Range:  Temp:  [98.2 °F (36.8 °C)-99.1 °F (37.3 °C)]   Pulse:  [83-94]   Resp:  [16-18]   BP: (110-132)/(60-84)   SpO2:  [99 %-100 %]     Labs:    Recent Labs     25  0530      K 4.2      CO2 23   BUN 22*   CREATININE 1.5*   GLU 74        No results for input(s): "PH", "PCO2", "PO2", "HCO3", "POCSATURATED", "BE" in the last 72 hours.    ASSESSMENT/INTERVENTIONS    Resting with no resp needs     Last VS   Temp: 98.7 °F (37.1 °C) (1559)  Pulse: 86 (1559)  Resp: 18 (1559)  BP: 132/80 (1559)  SpO2: 100 % (1559)    Level of Consciousness: Level of Consciousness (AVPU): alert  Respiratory Effort:   Expansion/Accessory Muscle Usage:    All Lung Field Breath Sounds: All Lung Fields Breath Sounds: Anterior:, Lateral:, clear  Is the ETCO2 monitor on? Yes  Is the patient wearing a cannula? Yes  Are ETCO2 orders placed? Yes  Is the patient on a PCA pump? Yes  ETCO2 monitored: ETCO2 (mmHg): 40 mmHg  Ambu at bedside: y    Active Orders   Respiratory Care    END TIDAL CO2 MONITOR Q12H     Frequency: Q12H     Number of Occurrences: Until Specified    Oxygen Continuous     Frequency: Continuous     " Number of Occurrences: Until Specified     Order Questions:      Device type: Low flow      Device: Nasal Cannula (1- 5 Liters)      LPM: 2      Titrate O2 per Oxygen Titration Protocol: Yes      To maintain SpO2 goal of: >= 90%      Notify MD of: Inability to achieve desired SpO2; Sudden change in patient status and requires 20% increase in FiO2; Patient requires >60% FiO2    Oxygen PRN     Frequency: PRN     Number of Occurrences: Until Specified     Order Questions:      To maintain SpO2 goal of: >= 92%    Pulse Oximetry Continuous     Frequency: Continuous     Number of Occurrences: Until Specified       RECOMMENDATIONS    We recommend: RRT Recs: Continue POC per primary team.    FOLLOW-UP    Please call back the Rapid Response RTMyles RRT at x [phone] for any questions or concerns.

## 2025-01-23 NOTE — PROGRESS NOTES
"[facility] Medicine  Progress Note    Patient Name: Nazanin Malone  MRN: [id number]  Patient Class: IP- Inpatient   Admission Date: 1/7/2025  Length of Stay: 15 days  Attending Physician: Eveline Gore MD  Primary Care Provider: Kezia, Primary Doctor        Subjective     Principal Problem:Vaso-occlusive pain due to sickle cell disease        HPI:  Nazanin Malone is a 44-year-old female with sickle cell beta thalassemia zero, multiple pulmonary emboli on chronic AC, asthma, and HTN who presented to [facility] ED 1/4/25 for diffuse myalgias and pain consistent with a sickle cell pain crisis as well as fever, nausea, vomiting, and diarrhea for the past day. She was recently admitted to [facility] 12/29/24 - 1/5/25 for pain c/w sickle cell pain crises which was refractory to her home regimen of tylenol, oxycodone 15mg, and muscle relaxers. She reported that she moved to the area about 6 weeks ago after living in [place]. She was receiving chronic exchange transfusions there but is not currently receiving them as part of her therapy here. She is seeing a hematologist. She also reports that she previously was on MS contin, pen VK, and hydroxurea as part of her home regimen but has not been put back on those since she has moved here. Of note, during that recent admission, the provider was informed by nursing that the patient asked if she could be given a little more medicine than what was prescribed stating "no one has to know". She also asked if nursing needed some help to throw away an empty flush syringe and an empty med syringe. At that time, she was prescribed Dilaudid 3mg IV q3h PRN and IVF with improvement in her pain.    In the ED, /99 HR 74 RR 20 sats adequate on ambient air. Tmax 100.9. Labs notable for Hgb 9.8 and K 2.9. LA wnl. COVID, Flu, and RSV negative. Blood cultures were obtained. UA was ordered. CXR showed . Received Dilaudid 3mg IV in total, Tylenol 1g, Benadryl 25mg PO, Zofran 4mg IV, " and potassium 25mEq PO.    The patient was admitted to Hospital Medicine for further workup and management.    Overview/Hospital Course:  44-year-old female with sickle cell beta thalassemia zero, multiple pulmonary emboli on chronic AC, asthma, and HTN who was admitted for acute sickle cell pain crisis.Initiated on oxy/dilaudid/benadryl prn. Blood cultures positive for CONS and due to port ID was consulted and recommended port removal with IV vanc. Recommended course until 1/19/25. Patient wanting new port during hospitalization. Discussed with IR after cultures negative at 48 hours and decision made for outpatient port. Wean dilaudid as tolerated. U/S LUE revealed occlusive thrombus of L cephalic vein and partial occlusive thrombus of L IJ improved from previous. Heat compresses and prn pain control as needed. Patient resistant to weaning down on pain meds today stated she will try to wean down starting tomrrow    Interval hx:  No events overnight.  Feels improved pain with PCA, would like to continue current dose.  Hgb 8.  Hopeful to go home in next 1-2 days.        ROS   Constitutional: Negative for chills, fatigue, fever.   HENT: Negative for sore throat, trouble swallowing.    Eyes: Negative for photophobia, visual disturbance.   Respiratory: Negative for cough, shortness of breath.    Cardiovascular: Negative for chest pain, palpitations, leg swelling.   Gastrointestinal: Negative for abdominal pain, constipation, diarrhea, nausea, vomiting.   Endocrine: Negative for cold intolerance, heat intolerance.   Genitourinary: Negative for dysuria, frequency.   Musculoskeletal: Negative for arthralgias, myalgias.   Skin: Negative for rash, wound, erythema   Neurological: Negative for dizziness, syncope, weakness, light-headedness.   Psychiatric/Behavioral: Negative for confusion, hallucinations, anxiety  All other systems reviewed and are negative.    PEx   Temp:  [98.2 °F (36.8 °C)-99.1 °F (37.3 °C)]   Pulse:   "[83-94]   Resp:  [16-18]   BP: (110-127)/(60-84)   SpO2:  [99 %-100 %]      I & O (Last 24H):   No intake or output data in the 24 hours ending 01/23/25 1218    General appearance: no distress, alert and oriented x 3  HEENT:  conjunctivae/corneas clear, PERRL, mucous membranes moist   Neck: supple, thyroid not enlarged  Pulm:   normal respiratory effort, CTAB, no wheezes, rales or rhonchi, on RA  Card: RRR, S1, S2 normal, no murmur, click, rub or gallop, no JVD  Abd: soft, NT, ND, BS present; no masses, no organomegaly, obese  Ext: LUE edema  Pulses: 2+, symmetric  Skin: color, texture, turgor normal. No rashes or lesions  Neuro: CN II-XII grossly intact, no focal numbness or weakness, normal strength and tone   Psych: normal mood and affect      No results found for this or any previous visit (from the past 24 hours).      No results for input(s): "POCTGLUCOSE" in the last 168 hours.    Hemoglobin A1C   Date Value Ref Range Status   12/22/2020 4.8 4.0 - 5.6 % Final     Comment:     ADA Screening Guidelines:  5.7-6.4%  Consistent with prediabetes  >or=6.5%  Consistent with diabetes  High levels of fetal hemoglobin interfere with the HbA1C  assay. Heterozygous hemoglobin variants (HbS, HgC, etc)do  not significantly interfere with this assay.   However, presence of multiple variants may affect accuracy.     04/25/2019 5.4 4.0 - 5.6 % Final     Comment:     ADA Screening Guidelines:  5.7-6.4%  Consistent with prediabetes  >or=6.5%  Consistent with diabetes  High levels of fetal hemoglobin interfere with the HbA1C  assay. Heterozygous hemoglobin variants (HbS, HgC, etc)do  not significantly interfere with this assay.   However, presence of multiple variants may affect accuracy.          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]  Yes    Superficial venous thrombosis of left arm [I82.612]  Yes    Bacteremia due to Staphylococcus epidermidis [R78.81, B95.7]  Yes "    Chronic prescription opiate use [Z79.891]  Not Applicable     Chronic    Chronic gastritis [K29.50]  Yes     Chronic    Chronic anticoagulation [Z79.01]  Not Applicable     Chronic    History of pulmonary embolism [Z86.711]  Yes     Chronic    Intermittent asthma [J45.20]  Yes     Chronic    Hypertension [I10]  Yes     Chronic      Resolved Hospital Problems    Diagnosis Date Resolved POA    Chest wall pain [R07.89] 01/16/2025 No    Fever [R50.9] 01/16/2025 Yes    Diarrhea [R19.7] 01/16/2025 Yes         acetaminophen  975 mg Oral TID    amLODIPine  10 mg Oral Daily    apixaban  5 mg Oral BID    folic acid  1 mg Oral Daily    gabapentin  600 mg Oral TID    hydroxyurea  1,000 mg Oral Daily    methocarbamoL  1,000 mg Oral TID    pantoprazole  40 mg Oral BID    polyethylene glycol  17 g Oral Daily    senna-docusate 8.6-50 mg  1 tablet Oral BID    sucralfate  1 g Oral QID (AC & HS)       Current Facility-Administered Medications:     albuterol, 2 puff, Inhalation, Q6H PRN    calcium carbonate, 1,000 mg, Oral, TID PRN    dextrose 50%, 12.5 g, Intravenous, PRN    dextrose 50%, 25 g, Intravenous, PRN    diphenhydrAMINE-zinc acetate 2-0.1%, , Topical (Top), TID PRN    glucagon (human recombinant), 1 mg, Intramuscular, PRN    glucose, 16 g, Oral, PRN    glucose, 24 g, Oral, PRN    hydrOXYzine HCL, 50 mg, Oral, TID PRN    melatonin, 6 mg, Oral, Nightly PRN    naloxone, 0.02 mg, Intravenous, PRN    naloxone, 0.2 mg, Intravenous, PRN    ondansetron, 8 mg, Oral, Q8H PRN    oxyCODONE, 15 mg, Oral, Q4H PRN    prochlorperazine, 10 mg, Oral, Q6H PRN    sodium chloride 0.9%, 10 mL, Intravenous, PRN      Assessment and Plan     * Vaso-occlusive pain due to sickle cell disease  44-year-old female with sickle cell beta thalassemia zero, multiple pulmonary emboli on chronic AC, asthma, and HTN who presented to [facility] ED 1/4/25 for diffuse myalgias and pain consistent with a sickle cell pain crisis as well as fever, nausea, vomiting, and  diarrhea for the past day. She was recently admitted to [facility] 12/29/24 - 1/5/25 for a pain crisis.  CXR without focal consolidation. I am not concerned for acute chest at this time.    Plan  - Multimodal pain regimen: scheduled tylenol, robaxin, and oxycodone PRN   - change to dilaudid PCA, increased to 0.6mg q15 for total 2.4mg/hr, continue   - Folic acid, hydroxyurea and other home SCD medications reordered  - repeat CXR 1/11/25 without signs of acute chest, atelectasis add incentive spirometry     Superficial venous thrombosis of left arm  U/S LUE revealed occlusive thrombus of L cephalic vein and partial occlusive thrombus of L IJ improved from previous.   - Heat compresses and prn pain control as needed.     Bacteremia due to Staphylococcus epidermidis  -blood cultures with staph epidermis and another CONS   -due to port placement ID consult placed  -stopped Rocephin and started on Vancomycin   -TTE negative for vegetations   -s/p Port removal with General Surgery 1/12/25  -repeat BC NGTD  -Cleared by Heme Onc and ID as cultures have been negative  -IR consult placed for port, recommended to be done OP  -completed Vancomycin with ALYSE 1/19/25          Chronic prescription opiate use  Uses  tylenol, oxycodone 15mg, and muscle relaxers. Pain has been refractory to these due to reported emesis.    Plan  - See Vaso-occlusive pain      Chronic gastritis  Chronic and stable.    Plan  - Continue home PPI and carafate      Chronic anticoagulation  See History of PE      History of pulmonary embolism  Chronic and stable.    Plan  - Continue home Eliquis        Intermittent asthma  Chronic and stable. Not in respiratory distress.    Plan  - Continue home albuterol PRN      Hypertension  Patient's blood pressure range in the last 24 hours was: BP  Min: 109/77  Max: 166/81.The patient's inpatient anti-hypertensive regimen is listed below:  Current Antihypertensives  amLODIPine tablet 10 mg, Daily, Oral    Plan  - BP is  controlled, no changes needed to their regimen      VTE Risk Mitigation (From admission, onward)           Ordered     apixaban tablet 5 mg  2 times daily         01/12/25 1258     IP VTE HIGH RISK PATIENT  Once         01/07/25 2326     Place sequential compression device  Until discontinued         01/07/25 2326                    Discharge Planning   ALYSE: 1/24/2025     Code Status: Full Code   Medical Readiness for Discharge Date:   Discharge Plan A: Home with family   Discharge Delays: None known at this time                    Eveline Gore MD  Department of Hospital Medicine   [facility]

## 2025-01-23 NOTE — PROGRESS NOTES
"[facility] - Med Surg  Adult Nutrition  Progress Note    SUMMARY       Recommendations    1. Continue regular diet as tolerated   2. Encourage good intake   3. RD to monitor weight, labs, PO intake    Goals: % nutritional needs met with diet  Nutrition Goal Status: progressing towards goal  Communication of RD Recs: other (comment) (POC)    Assessment and Plan    No nutrition diagnosis at this time.     Reason for Assessment    Reason For Assessment: RD follow-up  Diagnosis: other (see comments) (vaso-occlusive pain due to sickle cell disease)  General Information Comments: Pt admitted with vaso-occlusive pain due to sickle cell disease. Pt having a good intake - PO: 50-75% No GI signs/symptoms. BM: .  Nutrition Discharge Planning: general healthy diet    Nutrition/Diet History    Spiritual, Cultural Beliefs, Gnosticism Practices, Values that Affect Care: no  Food Allergies: NKFA  Factors Affecting Nutritional Intake: decreased appetite    Nutrition Related Social Determinants of Health: SDOH: Adequate food in home environment       Food Insecurity: No Food Insecurity (2025)     Hunger Vital Sign      Worried About Running Out of Food in the Last Year: Never true      Ran Out of Food in the Last Year: Never true   Recent Concern: Food Insecurity - Food Insecurity Present (2024)     Hunger Vital Sign      Worried About Running Out of Food in the Last Year: Often true      Ran Out of Food in the Last Year: Often true     Anthropometrics    Height: 5' 2" (157.5 cm)  Height (inches): 62 in  Height Method: Stated  Weight: 93.4 kg (205 lb 14.6 oz)  Weight (lb): 205.91 lb  Weight Method: Bed Scale  Ideal Body Weight (IBW), Female: 110 lb  % Ideal Body Weight, Female (lb): 187.19 %  BMI (Calculated): 37.7  BMI Grade: 35 - 39.9 - obesity - grade II  Usual Body Weight (UBW), k.9 kg  % Usual Body Weight: 102.97  % Weight Change From Usual Weight: 2.75 %     Lab/Procedures/Meds    Pertinent Labs Reviewed: " reviewed  Pertinent Labs Comments: RBC 3.53 low, H/H 8.0/23.6 low, BUN 22 high, creatinine 1.5 high, GFR 43.3 low, albumin 3.3 low  Pertinent Medications Reviewed: reviewed  Pertinent Medications Comments: amlodipine, apixaban, folic acid, gabapentin, hydroxyurea, methocarbamol, pantoprazole, senna, sucralfate    Estimated/Assessed Needs    Weight Used For Calorie Calculations: 93.4 kg (205 lb 14.6 oz)  Energy Calorie Requirements (kcal): 1527 kcal (NCM)  Energy Need Method: [place]-St Jeor (no AF)  Protein Requirements: 40-50 g/pro (0.8-1.0 g/kg) (NCM)  Weight Used For Protein Calculations: 49.9 kg (110 lb) (IBW)        RDA Method (mL): 1527     Nutrition Prescription Ordered    Current Diet Order: regular    Evaluation of Received Nutrient/Fluid Intake    Energy Calories Required: meeting needs  Protein Required: meeting needs  Tolerance: tolerating  % Intake of Estimated Energy Needs: 50 - 75 %  % Meal Intake: 50 - 75 %    Nutrition Risk    Level of Risk/Frequency of Follow-up: low     Monitor and Evaluation    Food and Nutrient Intake: energy intake, food and beverage intake  Food and Nutrient Adminstration: diet order  Knowledge/Beliefs/Attitudes: beliefs and attitudes, food and nutrition knowledge/skill  Physical Activity and Function: nutrition-related ADLs and IADLs, factors affecting access to physical activity  Anthropometric Measurements: body mass index, weight change, weight  Biochemical Data, Medical Tests and Procedures: lipid profile, inflammatory profile, glucose/endocrine profile, gastrointestinal profile, electrolyte and renal panel  Nutrition-Focused Physical Findings: overall appearance     Nutrition Follow-Up    RD Follow-up?: Yes

## 2025-01-23 NOTE — PLAN OF CARE
Patient is AAOx4. Vital signs stable. No falls throughout shift. Pain managed with PCA pump. IV fluids infusing.  Problem: Adult Inpatient Plan of Care  Goal: Plan of Care Review  Outcome: Progressing  Goal: Patient-Specific Goal (Individualized)  Outcome: Progressing  Goal: Absence of Hospital-Acquired Illness or Injury  Outcome: Progressing  Goal: Optimal Comfort and Wellbeing  Outcome: Progressing  Goal: Readiness for Transition of Care  Outcome: Progressing     Problem: Sickle Cell Disease  Goal: Optimal Cerebral Tissue Perfusion  Outcome: Progressing  Goal: Optimal Coping with Sickle Cell Disease  Outcome: Progressing  Goal: Effective Tissue Perfusion  Outcome: Progressing  Goal: Absence of Infection Signs and Symptoms  Outcome: Progressing  Goal: Optimal Pain Control and Function  Outcome: Progressing  Goal: Optimal Oxygenation  Outcome: Progressing     Problem: Infection  Goal: Absence of Infection Signs and Symptoms  Outcome: Progressing     Problem: Fall Injury Risk  Goal: Absence of Fall and Fall-Related Injury  Outcome: Progressing     Problem: Pain Chronic (Persistent)  Goal: Optimal Pain Control and Function  Outcome: Progressing

## 2025-01-23 NOTE — PLAN OF CARE
Recommendations     1. Continue regular diet as tolerated   2. Encourage good intake   3. RD to monitor weight, labs, PO intake     Goals: % nutritional needs met with diet  Nutrition Goal Status: progressing towards goal  Communication of RD Recs: other (comment) (POC)

## 2025-01-24 LAB
ALBUMIN SERPL BCP-MCNC: 3.1 G/DL (ref 3.5–5.2)
ALP SERPL-CCNC: 74 U/L (ref 40–150)
ALT SERPL W/O P-5'-P-CCNC: 11 U/L (ref 10–44)
ANION GAP SERPL CALC-SCNC: 7 MMOL/L (ref 8–16)
AST SERPL-CCNC: 17 U/L (ref 10–40)
BASOPHILS # BLD AUTO: 0.07 K/UL (ref 0–0.2)
BASOPHILS NFR BLD: 1 % (ref 0–1.9)
BILIRUB SERPL-MCNC: 0.2 MG/DL (ref 0.1–1)
BUN SERPL-MCNC: 20 MG/DL (ref 6–20)
CALCIUM SERPL-MCNC: 9.2 MG/DL (ref 8.7–10.5)
CHLORIDE SERPL-SCNC: 105 MMOL/L (ref 95–110)
CO2 SERPL-SCNC: 29 MMOL/L (ref 23–29)
CREAT SERPL-MCNC: 1.2 MG/DL (ref 0.5–1.4)
DIFFERENTIAL METHOD BLD: ABNORMAL
EOSINOPHIL # BLD AUTO: 0.6 K/UL (ref 0–0.5)
EOSINOPHIL NFR BLD: 8.4 % (ref 0–8)
ERYTHROCYTE [DISTWIDTH] IN BLOOD BY AUTOMATED COUNT: 23.1 % (ref 11.5–14.5)
EST. GFR  (NO RACE VARIABLE): 56.5 ML/MIN/1.73 M^2
GLUCOSE SERPL-MCNC: 132 MG/DL (ref 70–110)
HCT VFR BLD AUTO: 21.9 % (ref 37–48.5)
HGB BLD-MCNC: 7.2 G/DL (ref 12–16)
IMM GRANULOCYTES # BLD AUTO: 0.02 K/UL (ref 0–0.04)
IMM GRANULOCYTES NFR BLD AUTO: 0.3 % (ref 0–0.5)
LYMPHOCYTES # BLD AUTO: 2.1 K/UL (ref 1–4.8)
LYMPHOCYTES NFR BLD: 29.2 % (ref 18–48)
MCH RBC QN AUTO: 21.7 PG (ref 27–31)
MCHC RBC AUTO-ENTMCNC: 32.9 G/DL (ref 32–36)
MCV RBC AUTO: 66 FL (ref 82–98)
MONOCYTES # BLD AUTO: 0.7 K/UL (ref 0.3–1)
MONOCYTES NFR BLD: 9.2 % (ref 4–15)
NEUTROPHILS # BLD AUTO: 3.7 K/UL (ref 1.8–7.7)
NEUTROPHILS NFR BLD: 51.9 % (ref 38–73)
NRBC BLD-RTO: 3 /100 WBC
PLATELET # BLD AUTO: 476 K/UL (ref 150–450)
PMV BLD AUTO: 9.3 FL (ref 9.2–12.9)
POTASSIUM SERPL-SCNC: 4.1 MMOL/L (ref 3.5–5.1)
PROT SERPL-MCNC: 6.9 G/DL (ref 6–8.4)
RBC # BLD AUTO: 3.32 M/UL (ref 4–5.4)
SODIUM SERPL-SCNC: 141 MMOL/L (ref 136–145)
WBC # BLD AUTO: 7.05 K/UL (ref 3.9–12.7)

## 2025-01-24 PROCEDURE — 25000003 PHARM REV CODE 250: Performed by: STUDENT IN AN ORGANIZED HEALTH CARE EDUCATION/TRAINING PROGRAM

## 2025-01-24 PROCEDURE — 36415 COLL VENOUS BLD VENIPUNCTURE: CPT | Performed by: STUDENT IN AN ORGANIZED HEALTH CARE EDUCATION/TRAINING PROGRAM

## 2025-01-24 PROCEDURE — 11000001 HC ACUTE MED/SURG PRIVATE ROOM

## 2025-01-24 PROCEDURE — 25000003 PHARM REV CODE 250

## 2025-01-24 PROCEDURE — 85025 COMPLETE CBC W/AUTO DIFF WBC: CPT | Performed by: STUDENT IN AN ORGANIZED HEALTH CARE EDUCATION/TRAINING PROGRAM

## 2025-01-24 PROCEDURE — 63600175 PHARM REV CODE 636 W HCPCS: Performed by: STUDENT IN AN ORGANIZED HEALTH CARE EDUCATION/TRAINING PROGRAM

## 2025-01-24 PROCEDURE — 99900035 HC TECH TIME PER 15 MIN (STAT)

## 2025-01-24 PROCEDURE — 63600175 PHARM REV CODE 636 W HCPCS: Mod: JZ,TB | Performed by: INTERNAL MEDICINE

## 2025-01-24 PROCEDURE — 80053 COMPREHEN METABOLIC PANEL: CPT | Performed by: STUDENT IN AN ORGANIZED HEALTH CARE EDUCATION/TRAINING PROGRAM

## 2025-01-24 PROCEDURE — 27000221 HC OXYGEN, UP TO 24 HOURS

## 2025-01-24 RX ORDER — SODIUM CHLORIDE, SODIUM LACTATE, POTASSIUM CHLORIDE, CALCIUM CHLORIDE 600; 310; 30; 20 MG/100ML; MG/100ML; MG/100ML; MG/100ML
INJECTION, SOLUTION INTRAVENOUS CONTINUOUS
Status: DISCONTINUED | OUTPATIENT
Start: 2025-01-24 | End: 2025-01-25 | Stop reason: HOSPADM

## 2025-01-24 RX ADMIN — Medication: at 09:01

## 2025-01-24 RX ADMIN — METHOCARBAMOL 1000 MG: 500 TABLET ORAL at 08:01

## 2025-01-24 RX ADMIN — AMLODIPINE BESYLATE 10 MG: 10 TABLET ORAL at 08:01

## 2025-01-24 RX ADMIN — SUCRALFATE 1 G: 1 TABLET ORAL at 11:01

## 2025-01-24 RX ADMIN — SODIUM CHLORIDE, POTASSIUM CHLORIDE, SODIUM LACTATE AND CALCIUM CHLORIDE: 600; 310; 30; 20 INJECTION, SOLUTION INTRAVENOUS at 12:01

## 2025-01-24 RX ADMIN — SENNOSIDES AND DOCUSATE SODIUM 1 TABLET: 50; 8.6 TABLET ORAL at 08:01

## 2025-01-24 RX ADMIN — PANTOPRAZOLE SODIUM 40 MG: 40 TABLET, DELAYED RELEASE ORAL at 08:01

## 2025-01-24 RX ADMIN — APIXABAN 5 MG: 5 TABLET, FILM COATED ORAL at 08:01

## 2025-01-24 RX ADMIN — GABAPENTIN 600 MG: 300 CAPSULE ORAL at 03:01

## 2025-01-24 RX ADMIN — SUCRALFATE 1 G: 1 TABLET ORAL at 08:01

## 2025-01-24 RX ADMIN — GABAPENTIN 600 MG: 300 CAPSULE ORAL at 08:01

## 2025-01-24 RX ADMIN — Medication: at 11:01

## 2025-01-24 RX ADMIN — SUCRALFATE 1 G: 1 TABLET ORAL at 04:01

## 2025-01-24 RX ADMIN — SUCRALFATE 1 G: 1 TABLET ORAL at 06:01

## 2025-01-24 RX ADMIN — Medication: at 05:01

## 2025-01-24 RX ADMIN — Medication: at 06:01

## 2025-01-24 RX ADMIN — HYDROXYUREA 1000 MG: 500 CAPSULE ORAL at 08:01

## 2025-01-24 RX ADMIN — POLYETHYLENE GLYCOL 3350 17 G: 17 POWDER, FOR SOLUTION ORAL at 08:01

## 2025-01-24 RX ADMIN — METHOCARBAMOL 1000 MG: 500 TABLET ORAL at 03:01

## 2025-01-24 RX ADMIN — Medication: at 02:01

## 2025-01-24 RX ADMIN — FOLIC ACID 1 MG: 1 TABLET ORAL at 08:01

## 2025-01-24 NOTE — SUBJECTIVE & OBJECTIVE
Interval History: Pt seen and examined this morning on rounds. SANCHO. Reported overnight patient using timer to remember PCA pushes. She is in pain today but says she would like to DC tomorrow. Care plan reviewed. Otherwise, doing well and with no further complaints at this time.         Objective:     Vital Signs (Most Recent):  Temp: 98 °F (36.7 °C) (01/24/25 0748)  Pulse: 84 (01/24/25 0748)  Resp: 18 (01/24/25 0748)  BP: 138/69 (01/24/25 0748)  SpO2: 100 % (01/24/25 0748) Vital Signs (24h Range):  Temp:  [98 °F (36.7 °C)-98.7 °F (37.1 °C)] 98 °F (36.7 °C)  Pulse:  [84-94] 84  Resp:  [16-18] 18  SpO2:  [98 %-100 %] 100 %  BP: (110-138)/(68-80) 138/69     Weight: 93.4 kg (205 lb 14.6 oz)  Body mass index is 37.66 kg/m².  No intake or output data in the 24 hours ending 01/24/25 0947      Physical Exam  Constitutional:       Appearance: Normal appearance.   HENT:      Head: Normocephalic and atraumatic.      Nose: Nose normal.      Mouth/Throat:      Mouth: Mucous membranes are moist.   Eyes:      Extraocular Movements: Extraocular movements intact.      Pupils: Pupils are equal, round, and reactive to light.   Cardiovascular:      Rate and Rhythm: Normal rate and regular rhythm.      Heart sounds: No murmur heard.     No gallop.   Pulmonary:      Effort: Pulmonary effort is normal.      Breath sounds: Normal breath sounds. No wheezing or rales.   Abdominal:      General: Abdomen is flat. Bowel sounds are normal. There is no distension.      Palpations: Abdomen is soft.      Tenderness: There is no abdominal tenderness.   Musculoskeletal:         General: No swelling or tenderness. Normal range of motion.      Cervical back: Normal range of motion and neck supple.      Right lower leg: No edema.      Left lower leg: No edema.   Skin:     General: Skin is warm and dry.      Capillary Refill: Capillary refill takes less than 2 seconds.      Comments: Scarring present on the right leg      Neurological:      General: No  focal deficit present.      Mental Status: She is alert. Mental status is at baseline.   Psychiatric:         Mood and Affect: Mood normal.             Significant Labs: All pertinent labs within the past 24 hours have been reviewed.    Significant Imaging: I have reviewed all pertinent imaging results/findings within the past 24 hours.

## 2025-01-24 NOTE — ASSESSMENT & PLAN NOTE
RESOLVED  -blood cultures with staph epidermis and another CONS   -due to port placement ID consult placed  -stopped Rocephin and started on Vancomycin   -TTE negative for vegetations   -s/p Port removal with General Surgery 1/12/25  -repeat BC NGTD  -Cleared by Heme Onc and ID as cultures have been negative  -IR consult placed for port, recommended to be done OP  -completed Vancomycin with ALYSE 1/19/25

## 2025-01-24 NOTE — RESPIRATORY THERAPY
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    24 Hours Vitals Range:  Temp:  [98 °F (36.7 °C)-98.5 °F (36.9 °C)]   Pulse:  []   Resp:  [16-19]   BP: (111-143)/(60-87)   SpO2:  [97 %-100 %]     Labs:    Recent Labs     25  0530 25  1332    141   K 4.2 4.1    105   CO2 23 29   BUN 22* 20   CREATININE 1.5* 1.2   GLU 74 132*            ASSESSMENT/INTERVENTIONS    Bedside ETCO2 check completed. Pt easily arousable, alert and oriented with ETCO2 of 66. No concerns at this time.     Last VS   Temp: 98 °F (36.7 °C) (1527)  Pulse: 104 (1527)  Resp: 18 (1527)  BP: 120/60 (1527)  SpO2: 98 % (1527)    Level of Consciousness: Level of Consciousness (AVPU): alert  Respiratory Effort:   Expansion/Accessory Muscle Usage:    All Lung Field Breath Sounds: All Lung Fields Breath Sounds: clear  Is the ETCO2 monitor on? Yes  Is the patient wearing a cannula? Yes  Are ETCO2 orders placed? Yes  Is the patient on a PCA pump? Yes  ETCO2 monitored: ETCO2 (mmHg): 66 mmHg  Ambu at bedside: yes    Active Orders   Respiratory Care    END TIDAL CO2 MONITOR Q12H     Frequency: Q12H     Number of Occurrences: Until Specified    Oxygen Continuous     Frequency:  Continuous     Number of Occurrences: Until Specified     Order Questions:      Device type: Low flow      Device: Nasal Cannula (1- 5 Liters)      LPM: 2      Titrate O2 per Oxygen Titration Protocol: Yes      To maintain SpO2 goal of: >= 90%      Notify MD of: Inability to achieve desired SpO2; Sudden change in patient status and requires 20% increase in FiO2; Patient requires >60% FiO2    Oxygen PRN     Frequency: PRN     Number of Occurrences: Until Specified     Order Questions:      To maintain SpO2 goal of: >= 92%    Pulse Oximetry Continuous     Frequency: Continuous     Number of Occurrences: Until Specified       RECOMMENDATIONS    We recommend: RRT Recs: Continue POC per primary team.    FOLLOW-UP    Please call back the Rapid Response RT, Betty Pisano RRT at x [phone] for any questions or concerns.

## 2025-01-24 NOTE — ASSESSMENT & PLAN NOTE
44-year-old female with sickle cell beta thalassemia zero, multiple pulmonary emboli on chronic AC, asthma, and HTN who presented to [facility] ED 1/4/25 for diffuse myalgias and pain consistent with a sickle cell pain crisis as well as fever, nausea, vomiting, and diarrhea for the past day. She was recently admitted to [facility] 12/29/24 - 1/5/25 for a pain crisis.  CXR without focal consolidation. I am not concerned for acute chest at this time.    Plan  - Multimodal pain regimen: scheduled tylenol, robaxin, and oxycodone PRN   - change to dilaudid PCA,  plan to DC 1/25/25  - Folic acid, hydroxyurea and other home SCD medications reordered  - repeat CXR 1/11/25 without signs of acute chest, atelectasis add incentive spirometry

## 2025-01-24 NOTE — ASSESSMENT & PLAN NOTE
Patient's blood pressure range in the last 24 hours was: BP  Min: 110/71  Max: 138/69.The patient's inpatient anti-hypertensive regimen is listed below:  Current Antihypertensives  amLODIPine tablet 10 mg, Daily, Oral    Plan  - BP is controlled, no changes needed to their regimen

## 2025-01-24 NOTE — PLAN OF CARE
01/24/25 1755   Discharge Reassessment   Assessment Type Discharge Planning Reassessment   Did the patient's condition or plan change since previous assessment? Yes   Discharge Plan discussed with: Patient   Communicated ALYSE with patient/caregiver Yes   Discharge Plan A Home   Discharge Plan B Home with family   DME Needed Upon Discharge  none   Transition of Care Barriers None   Why the patient remains in the hospital Requires continued medical care   Post-Acute Status   Coverage [employer] MANAGED MEDICARE - [employer] MEDICARE PLAN HMO   Other Status No Post-Acute Service Needs   Discharge Delays None known at this time         Elif met with pt at bedside to discuss dc planning. Pt stated that she is ambulatory and independent with ADL's. Pt reported that she will need a ride at discharge and that no other services were needed from case management.     Discharge Plan A and Plan B have been determined by review of patient's clinical status, future medical and therapeutic needs, and coverage/benefits for post-acute care in coordination with multidisciplinary team members.    ELIF Nichole  Case Management  [phone]

## 2025-01-24 NOTE — PROGRESS NOTES
"[facility]  Progress Note    Patient Name: Nazanin Malone  MRN: [id number]  Patient Class: IP- Inpatient   Admission Date: 1/7/2025  Length of Stay: 16 days  Attending Physician: Deb Quiroz MD  Primary Care Provider: Kezia, Primary Doctor        Subjective     Principal Problem:Vaso-occlusive pain due to sickle cell disease        HPI:  Nazanin Malone is a 44-year-old female with sickle cell beta thalassemia zero, multiple pulmonary emboli on chronic AC, asthma, and HTN who [place] [facility] [place] 1/4/25 for diffuse myalgias and pain consistent with a sickle cell pain crisis as well as fever, nausea, vomiting, and diarrhea for the past day. She was recently [place] [facility] [place]29[place]24 - 1/5/25 for pain c/w sickle cell pain crises which was refractory to her home regimen of tylenol, oxycodone 15mg, and muscle relaxers. She reported that she moved to the area about 6 weeks ago after living in [place]. She was receiving chronic exchange transfusions there but is not currently receiving them as part of her therapy here. She is seeing a hematologist. She also reports that she previously was on MS contin, pen VK, and hydroxurea as part of her home regimen but has not been put back on those since she has moved here. Of note, during that recent admission, the provider was informed by nursing that the patient asked if she could be given a little more medicine than what was prescribed stating "no one has to know". She also asked if nursing needed some help to throw away an empty flush syringe and an empty med syringe. At that time, she was prescribed Dilaudid 3mg IV q3h PRN and IVF with improvement in her pain.    In the ED, /99 HR 74 RR 20 sats adequate on ambient air. Tmax 100.9. Labs notable for Hgb 9.8 and K 2.9. LA wnl. COVID, Flu, and RSV negative. Blood cultures were obtained. UA was ordered. CXR showed . Received Dilaudid 3mg IV in total, Tylenol 1g, Benadryl 25mg PO, Zofran 4mg IV, " and potassium 25mEq PO.    The patient was admitted to Hospital Medicine for further workup and management.    Overview/Hospital Course:  44-year-old female with sickle cell beta thalassemia zero, multiple pulmonary emboli on chronic AC, asthma, and HTN who was admitted for acute sickle cell pain crisis.Initiated on oxy/dilaudid/benadryl prn. Blood cultures positive for CONS and due to port ID was consulted and recommended port removal with IV vanc. Recommended course until 1/19/25. Patient wanting new port during hospitalization. Discussed with IR after cultures negative at 48 hours and decision made for outpatient port. Wean dilaudid as tolerated. U/S LUE revealed occlusive thrombus of L cephalic vein and partial occlusive thrombus of L IJ improved from previous. Heat compresses and prn pain control as needed. Patient resistant to weaning down on pain meds today stated she will try to wean down starting tomrrow    Interval History: Pt seen and examined this morning on rounds. SANCHO. Reported overnight patient using timer to remember PCA pushes. She is in pain today but says she would like to DC tomorrow. Care plan reviewed. Otherwise, doing well and with no further complaints at this time.         Objective:     Vital Signs (Most Recent):  Temp: 98 °F (36.7 °C) (01/24/25 0748)  Pulse: 84 (01/24/25 0748)  Resp: 18 (01/24/25 0748)  BP: 138/69 (01/24/25 0748)  SpO2: 100 % (01/24/25 0748) Vital Signs (24h Range):  Temp:  [98 °F (36.7 °C)-98.7 °F (37.1 °C)] 98 °F (36.7 °C)  Pulse:  [84-94] 84  Resp:  [16-18] 18  SpO2:  [98 %-100 %] 100 %  BP: (110-138)/(68-80) 138/69     Weight: 93.4 kg (205 lb 14.6 oz)  Body mass index is 37.66 kg/m².  No intake or output data in the 24 hours ending 01/24/25 0947      Physical Exam  Constitutional:       Appearance: Normal appearance.   HENT:      Head: Normocephalic and atraumatic.      Nose: Nose normal.      Mouth/Throat:      Mouth: Mucous membranes are moist.   Eyes:       Extraocular Movements: Extraocular movements intact.      Pupils: Pupils are equal, round, and reactive to light.   Cardiovascular:      Rate and Rhythm: Normal rate and regular rhythm.      Heart sounds: No murmur heard.     No gallop.   Pulmonary:      Effort: Pulmonary effort is normal.      Breath sounds: Normal breath sounds. No wheezing or rales.   Abdominal:      General: Abdomen is flat. Bowel sounds are normal. There is no distension.      Palpations: Abdomen is soft.      Tenderness: There is no abdominal tenderness.   Musculoskeletal:         General: No swelling or tenderness. Normal range of motion.      Cervical back: Normal range of motion and neck supple.      Right lower leg: No edema.      Left lower leg: No edema.   Skin:     General: Skin is warm and dry.      Capillary Refill: Capillary refill takes less than 2 seconds.      Comments: Scarring present on the right leg      Neurological:      General: No focal deficit present.      Mental Status: She is alert. Mental status is at baseline.   Psychiatric:         Mood and Affect: Mood normal.             Significant Labs: All pertinent labs within the past 24 hours have been reviewed.    Significant Imaging: I have reviewed all pertinent imaging results/findings within the past 24 hours.    Assessment and Plan     * Vaso-occlusive pain due to sickle cell disease  44-year-old female with sickle cell beta thalassemia zero, multiple pulmonary emboli on chronic AC, asthma, and HTN who [place] [facility] [place]4/25 for diffuse myalgias and pain consistent with a sickle cell pain crisis as well as fever, nausea, vomiting, and diarrhea for the past day. She was recently [place] [facility] [place] - 1/5/25 for a pain crisis.  CXR without focal consolidation. I am not concerned for acute chest at this time.    Plan  - Multimodal pain regimen: scheduled tylenol, robaxin, and oxycodone PRN   - change to dilaudid PCA,  plan to DC 1/25/25  - Folic acid, hydroxyurea  and other home SCD medications reordered  - repeat CXR 1/11/25 without signs of acute chest, atelectasis add incentive spirometry     Bacteremia due to Staphylococcus epidermidis  RESOLVED  -blood cultures with staph epidermis and another CONS   -due to port placement ID consult placed  -stopped Rocephin and started on Vancomycin   -TTE negative for vegetations   -s/p Port removal with General Surgery 1/12/25  -repeat BC NGTD  -Cleared by Heme Onc and ID as cultures have been negative  -IR consult placed for port, recommended to be done OP  -completed Vancomycin with ALYSE 1/19/25          Chronic anticoagulation  See History of PE      History of pulmonary embolism  Chronic and stable.    Plan  - Continue home Eliquis        Hypertension  Patient's blood pressure range in the last 24 hours was: BP  Min: 110/71  Max: 138/69.The patient's inpatient anti-hypertensive regimen is listed below:  Current Antihypertensives  amLODIPine tablet 10 mg, Daily, Oral    Plan  - BP is controlled, no changes needed to their regimen    Intermittent asthma  Chronic and stable. Not in respiratory distress.    Plan  - Continue home albuterol PRN      Chronic prescription opiate use  Uses  tylenol, oxycodone 15mg, and muscle relaxers. Pain has been refractory to these due to reported emesis.    Plan  - See Vaso-occlusive pain      Chronic gastritis  Chronic and stable.    Plan  - Continue home PPI and carafate      Superficial venous thrombosis of left arm  U/S LUE revealed occlusive thrombus of L cephalic vein and partial occlusive thrombus of L IJ improved from previous.   - Heat compresses and prn pain control as needed.       VTE Risk Mitigation (From admission, onward)           Ordered     apixaban tablet 5 mg  2 times daily         01/12/25 1258     IP VTE HIGH RISK PATIENT  Once         01/07/25 2326     Place sequential compression device  Until discontinued         01/07/25 2326                    Discharge Planning   ALYSE:  1/25/2025     Code Status: Full Code   Medical Readiness for Discharge Date:   Discharge Plan A: Home with family   Discharge Delays: None known at this time                    Deb Quiroz MD  Department of Hospital Medicine   [facility]

## 2025-01-24 NOTE — NURSING
Patient is running through her dilaudid pca pump almost every 2.5 hours. She sets an alarm to press the button every 15 minutes, which is requiring us to change the syringe more frequently and requires a second nurse for sign off. Patient is misusing the PCA pump. MD and OC are aware of the improper use of the pca pump. Due to us having 6 patients and us having to have a secondary nurse, this is a lot for a med surg floor.

## 2025-01-25 VITALS
BODY MASS INDEX: 37.9 KG/M2 | SYSTOLIC BLOOD PRESSURE: 130 MMHG | HEIGHT: 62 IN | DIASTOLIC BLOOD PRESSURE: 63 MMHG | TEMPERATURE: 98 F | OXYGEN SATURATION: 94 % | HEART RATE: 84 BPM | WEIGHT: 205.94 LBS | RESPIRATION RATE: 18 BRPM

## 2025-01-25 LAB
ERYTHROCYTE [DISTWIDTH] IN BLOOD BY AUTOMATED COUNT: 23.5 % (ref 11.5–14.5)
HCT VFR BLD AUTO: 24.2 % (ref 37–48.5)
HGB BLD-MCNC: 7.7 G/DL (ref 12–16)
MCH RBC QN AUTO: 21.5 PG (ref 27–31)
MCHC RBC AUTO-ENTMCNC: 31.8 G/DL (ref 32–36)
MCV RBC AUTO: 68 FL (ref 82–98)
PLATELET # BLD AUTO: 544 K/UL (ref 150–450)
PMV BLD AUTO: 9.5 FL (ref 9.2–12.9)
RBC # BLD AUTO: 3.58 M/UL (ref 4–5.4)
WBC # BLD AUTO: 7.57 K/UL (ref 3.9–12.7)

## 2025-01-25 PROCEDURE — 94761 N-INVAS EAR/PLS OXIMETRY MLT: CPT

## 2025-01-25 PROCEDURE — 36415 COLL VENOUS BLD VENIPUNCTURE: CPT | Performed by: STUDENT IN AN ORGANIZED HEALTH CARE EDUCATION/TRAINING PROGRAM

## 2025-01-25 PROCEDURE — 25000003 PHARM REV CODE 250: Performed by: STUDENT IN AN ORGANIZED HEALTH CARE EDUCATION/TRAINING PROGRAM

## 2025-01-25 PROCEDURE — 25000003 PHARM REV CODE 250

## 2025-01-25 PROCEDURE — 85027 COMPLETE CBC AUTOMATED: CPT | Performed by: STUDENT IN AN ORGANIZED HEALTH CARE EDUCATION/TRAINING PROGRAM

## 2025-01-25 PROCEDURE — 25000003 PHARM REV CODE 250: Performed by: HOSPITALIST

## 2025-01-25 PROCEDURE — 63600175 PHARM REV CODE 636 W HCPCS: Performed by: STUDENT IN AN ORGANIZED HEALTH CARE EDUCATION/TRAINING PROGRAM

## 2025-01-25 PROCEDURE — 27000221 HC OXYGEN, UP TO 24 HOURS

## 2025-01-25 PROCEDURE — 63600175 PHARM REV CODE 636 W HCPCS: Mod: JZ,TB | Performed by: INTERNAL MEDICINE

## 2025-01-25 PROCEDURE — 99900035 HC TECH TIME PER 15 MIN (STAT)

## 2025-01-25 RX ORDER — DIPHENHYDRAMINE HCL 50 MG
50 CAPSULE ORAL ONCE
Status: COMPLETED | OUTPATIENT
Start: 2025-01-25 | End: 2025-01-25

## 2025-01-25 RX ORDER — PROMETHAZINE HYDROCHLORIDE 12.5 MG/1
25 TABLET ORAL EVERY 6 HOURS PRN
Qty: 24 TABLET | Refills: 0 | Status: SHIPPED | OUTPATIENT
Start: 2025-01-25 | End: 2025-01-28

## 2025-01-25 RX ORDER — MORPHINE SULFATE 30 MG/1
30 TABLET, FILM COATED, EXTENDED RELEASE ORAL EVERY 12 HOURS
Qty: 10 TABLET | Refills: 0 | Status: SHIPPED | OUTPATIENT
Start: 2025-01-25 | End: 2025-01-28 | Stop reason: SDUPTHER

## 2025-01-25 RX ORDER — METHOCARBAMOL 750 MG/1
750 TABLET, FILM COATED ORAL 3 TIMES DAILY PRN
Start: 2025-01-25 | End: 2025-01-28 | Stop reason: SDUPTHER

## 2025-01-25 RX ADMIN — GABAPENTIN 600 MG: 300 CAPSULE ORAL at 09:01

## 2025-01-25 RX ADMIN — SODIUM CHLORIDE, POTASSIUM CHLORIDE, SODIUM LACTATE AND CALCIUM CHLORIDE: 600; 310; 30; 20 INJECTION, SOLUTION INTRAVENOUS at 07:01

## 2025-01-25 RX ADMIN — Medication: at 01:01

## 2025-01-25 RX ADMIN — OXYCODONE HYDROCHLORIDE 15 MG: 10 TABLET ORAL at 09:01

## 2025-01-25 RX ADMIN — Medication: at 04:01

## 2025-01-25 RX ADMIN — PANTOPRAZOLE SODIUM 40 MG: 40 TABLET, DELAYED RELEASE ORAL at 09:01

## 2025-01-25 RX ADMIN — HYDROXYUREA 1000 MG: 500 CAPSULE ORAL at 09:01

## 2025-01-25 RX ADMIN — HYDROXYZINE HYDROCHLORIDE 50 MG: 25 TABLET, FILM COATED ORAL at 02:01

## 2025-01-25 RX ADMIN — SUCRALFATE 1 G: 1 TABLET ORAL at 07:01

## 2025-01-25 RX ADMIN — SUCRALFATE 1 G: 1 TABLET ORAL at 09:01

## 2025-01-25 RX ADMIN — POLYETHYLENE GLYCOL 3350 17 G: 17 POWDER, FOR SOLUTION ORAL at 09:01

## 2025-01-25 RX ADMIN — APIXABAN 5 MG: 5 TABLET, FILM COATED ORAL at 09:01

## 2025-01-25 RX ADMIN — METHOCARBAMOL 1000 MG: 500 TABLET ORAL at 09:01

## 2025-01-25 RX ADMIN — ONDANSETRON 8 MG: 8 TABLET, ORALLY DISINTEGRATING ORAL at 12:01

## 2025-01-25 RX ADMIN — SENNOSIDES AND DOCUSATE SODIUM 1 TABLET: 50; 8.6 TABLET ORAL at 09:01

## 2025-01-25 RX ADMIN — AMLODIPINE BESYLATE 10 MG: 10 TABLET ORAL at 09:01

## 2025-01-25 RX ADMIN — FOLIC ACID 1 MG: 1 TABLET ORAL at 09:01

## 2025-01-25 RX ADMIN — DIPHENHYDRAMINE HYDROCHLORIDE 50 MG: 50 CAPSULE ORAL at 09:01

## 2025-01-25 NOTE — DISCHARGE SUMMARY
"[facility] Medicine  Discharge Summary      Patient Name: Nazanin Malone  MRN: [id number]  VLADIMIR: [id number]  Patient Class: IP- Inpatient  Admission Date: 1/7/2025  Hospital Length of Stay: 17 days  Discharge Date and Time:  01/25/2025 12:11 PM  Attending Physician: Deb Quiroz MD   Discharging Provider: Deb Quiroz MD  Primary Care Provider: Kezia Primary Doctor  [facility] Medicine Team: [facility] Deb Quiroz MD  Primary Care Team: [facility]    HPI:   Nazanin Malone is a 44-year-old female with sickle cell beta thalassemia zero, multiple pulmonary emboli on chronic AC, asthma, and HTN who presented to [facility] ED 1/4/25 for diffuse myalgias and pain consistent with a sickle cell pain crisis as well as fever, nausea, vomiting, and diarrhea for the past day. She was recently admitted to [facility] 12/29/24 - 1/5/25 for pain c/w sickle cell pain crises which was refractory to her home regimen of tylenol, oxycodone 15mg, and muscle relaxers. She reported that she moved to the area about 6 weeks ago after living in [place]. She was receiving chronic exchange transfusions there but is not currently receiving them as part of her therapy here. She is seeing a hematologist. She also reports that she previously was on MS contin, pen VK, and hydroxurea as part of her home regimen but has not been put back on those since she has moved here. Of note, during that recent admission, the provider was informed by nursing that the patient asked if she could be given a little more medicine than what was prescribed stating "no one has to know". She also asked if nursing needed some help to throw away an empty flush syringe and an empty med syringe. At that time, she was prescribed Dilaudid 3mg IV q3h PRN and IVF with improvement in her pain.    In the ED, /99 HR 74 RR 20 sats adequate on ambient air. Tmax 100.9. Labs notable for Hgb 9.8 and K 2.9. LA wnl. COVID, Flu, and RSV negative. Blood " cultures were obtained. UA was ordered. CXR showed . Received Dilaudid 3mg IV in total, Tylenol 1g, Benadryl 25mg PO, Zofran 4mg IV, and potassium 25mEq PO.    The patient was admitted to Hospital Medicine for further workup and management.    * No surgery found *      Hospital Course:   44-year-old female with sickle cell beta thalassemia zero, multiple pulmonary emboli on chronic AC, asthma, and HTN who was admitted for acute sickle cell pain crisis.Initiated on oxy/dilaudid/benadryl prn. Blood cultures positive for CONS and due to port ID was consulted and recommended port removal with IV vanc. Recommended course until 1/19/25. Patient wanting new port during hospitalization. Discussed with IR after cultures negative at 48 hours and decision made for outpatient port. Started on PCA pump due to difficulty weaning Dilaudid. U/S LUE revealed occlusive thrombus of L cephalic vein and partial occlusive thrombus of L IJ improved from previous. Heat compresses and prn pain control as needed. She continue on PCA pump until she was ready to be taken off. H&H remained over 7 throughout hospital stay and no transfusions given. Weaned off PCA pump 1/25/24. Planned for discharge home with close Heme Onc follow up.     Pt deemed appropriate for discharge; seen and examined prior to departure. Plan discussed with pt, who was agreeable and amenable; medications were discussed and reviewed, outpatient follow-up scheduled, ER precautions were given, all questions were answered to the pt's satisfaction, and she was subsequently discharged.       Goals of Care Treatment Preferences:  Code Status: Full Code      SDOH Screening:  The patient was screened for utility difficulties, food insecurity, transport difficulties, housing insecurity, and interpersonal safety and there were no concerns identified this admission.     Consults:   Consults (From admission, onward)          Status Ordering Provider     Inpatient consult to  Interventional Radiology  Once        Provider:  (Not yet assigned)    Completed CORRINA BOB     Inpatient consult to Midline team  Once        Provider:  (Not yet assigned)    Completed CORRINA BOB     Inpatient consult to General Surgery  Once        Provider:  (Not yet assigned)    Completed CORRINA BOB     Inpatient consult to Infectious Diseases  Once        Provider:  (Not yet assigned)    Completed CORRINA BOB            * Vaso-occlusive pain due to sickle cell disease  44-year-old female with sickle cell beta thalassemia zero, multiple pulmonary emboli on chronic AC, asthma, and HTN who presented to [facility] ED 1/4/25 for diffuse myalgias and pain consistent with a sickle cell pain crisis as well as fever, nausea, vomiting, and diarrhea for the past day. She was recently admitted to [facility] 12/29/24 - 1/5/25 for a pain crisis.  CXR without focal consolidation. I am not concerned for acute chest at this time.    Plan  - Multimodal pain regimen: scheduled tylenol, robaxin, and oxycodone PRN   - change to dilaudid PCA,  plan to DC 1/25/25  - Folic acid, hydroxyurea and other home SCD medications reordered  - repeat CXR 1/11/25 without signs of acute chest, atelectasis add incentive spirometry     Bacteremia due to Staphylococcus epidermidis  RESOLVED  -blood cultures with staph epidermis and another CONS   -due to port placement ID consult placed  -stopped Rocephin and started on Vancomycin   -TTE negative for vegetations   -s/p Port removal with General Surgery 1/12/25  -repeat BC NGTD  -Cleared by Heme Onc and ID as cultures have been negative  -IR consult placed for port, recommended to be done OP  -completed Vancomycin with ALYSE 1/19/25          Chronic anticoagulation  See History of PE      History of pulmonary embolism  Chronic and stable.    Plan  - Continue home Eliquis        Hypertension  Patient's blood pressure range in the last 24 hours was: BP  Min: 110/71  Max: 138/69.The  patient's inpatient anti-hypertensive regimen is listed below:  Current Antihypertensives  amLODIPine tablet 10 mg, Daily, Oral    Plan  - BP is controlled, no changes needed to their regimen    Intermittent asthma  Chronic and stable. Not in respiratory distress.    Plan  - Continue home albuterol PRN      Chronic prescription opiate use  Uses  tylenol, oxycodone 15mg, and muscle relaxers. Pain has been refractory to these due to reported emesis.    Plan  - See Vaso-occlusive pain      Chronic gastritis  Chronic and stable.    Plan  - Continue home PPI and carafate      Superficial venous thrombosis of left arm  U/S LUE revealed occlusive thrombus of L cephalic vein and partial occlusive thrombus of L IJ improved from previous.   - Heat compresses and prn pain control as needed.       Final Active Diagnoses:    Diagnosis Date Noted POA    PRINCIPAL PROBLEM:  Vaso-occlusive pain due to sickle cell disease [D57.00] 04/16/2017 Yes    Bacteremia due to Staphylococcus epidermidis [R78.81, B95.7] 01/10/2025 Yes    Chronic anticoagulation [Z79.01] 08/16/2024 Not Applicable     Chronic    History of pulmonary embolism [Z86.711] 06/08/2020 Yes     Chronic    Hypertension [I10] 04/16/2017 Yes     Chronic    Intermittent asthma [J45.20] 04/26/2019 Yes     Chronic    Chronic prescription opiate use [Z79.891] 12/31/2024 Not Applicable     Chronic    Chronic gastritis [K29.50] 08/22/2024 Yes     Chronic    Superficial venous thrombosis of left arm [I82.612] 01/19/2025 Yes      Problems Resolved During this Admission:    Diagnosis Date Noted Date Resolved POA    Fever [R50.9] 01/08/2025 01/16/2025 Yes    Diarrhea [R19.7] 11/11/2024 01/16/2025 Yes    Chest wall pain [R07.89] 01/11/2025 01/16/2025 No       Discharged Condition: good    Disposition: Home or Self Care    Follow Up:   Follow-up Information       No, Primary Doctor Follow up in 1 week(s).                           Patient Instructions:      Ambulatory referral/consult  to Interventional RAD   Standing Status: Future   Referral Priority: Routine Referral Type: Consultation   Number of Visits Requested: 1     Diet Adult Regular     Notify your health care provider if you experience any of the following:  temperature >100.4     Notify your health care provider if you experience any of the following:  persistent nausea and vomiting or diarrhea     Notify your health care provider if you experience any of the following:  difficulty breathing or increased cough     Notify your health care provider if you experience any of the following:  severe persistent headache     Notify your health care provider if you experience any of the following:  persistent dizziness, light-headedness, or visual disturbances     Notify your health care provider if you experience any of the following:  increased confusion or weakness     Activity as tolerated       Significant Diagnostic Studies: N/A    Pending Diagnostic Studies:       None           Medications:  Reconciled Home Medications:      Medication List        START taking these medications      methocarbamoL 750 MG Tab  Commonly known as: ROBAXIN  Take 1 tablet (750 mg total) by mouth 3 (three) times daily as needed (muscle pain).     morphine 30 MG 12 hr tablet  Commonly known as: MS CONTIN  Take 1 tablet (30 mg total) by mouth every 12 (twelve) hours. for 5 days            CHANGE how you take these medications      promethazine 12.5 MG Tab  Commonly known as: PHENERGAN  Take 2 tablets (25 mg total) by mouth every 6 (six) hours as needed (nausea).  What changed:   when to take this  reasons to take this            CONTINUE taking these medications      acetaminophen 500 MG tablet  Commonly known as: TYLENOL  Take 1,000 mg by mouth every 8 (eight) hours as needed for Pain.     albuterol 90 mcg/actuation inhaler  Commonly known as: VENTOLIN HFA  Inhale 2 puffs into the lungs every 6 (six) hours as needed for Wheezing or Shortness of Breath. Rescue      amLODIPine 10 MG tablet  Commonly known as: NORVASC  Take 1 tablet (10 mg total) by mouth once daily.     apixaban 5 mg Tab  Commonly known as: ELIQUIS  Take 1 tablet (5 mg total) by mouth 2 (two) times daily.     FLUoxetine 40 MG capsule  Take 1 capsule (40 mg total) by mouth once daily.     fluticasone propionate 50 mcg/actuation nasal spray  Commonly known as: FLONASE  INSTILL 1 SPRAY INTO EACH NOSTRIL EVERY DAY     folic acid 1 MG tablet  Commonly known as: FOLVITE  Take 1 tablet (1 mg total) by mouth once daily.     gabapentin 300 MG capsule  Commonly known as: NEURONTIN  Take 2 capsules (600 mg total) by mouth 3 (three) times daily.     hydroxyurea 500 mg Cap  Commonly known as: HYDREA  Take 2 capsules (1,000 mg total) by mouth once daily.     hydrOXYzine pamoate 25 MG Cap  Commonly known as: VISTARIL  Take 1 capsule (25 mg total) by mouth every 4 (four) hours as needed (Anxiety).     metoclopramide HCl 5 MG tablet  Commonly known as: REGLAN  Take 1 tablet (5 mg total) by mouth 3 (three) times daily before meals.     oxyCODONE 15 MG Tab  Commonly known as: ROXICODONE  Take 1 tablet (15 mg total) by mouth every 4 (four) hours as needed for Pain.     pantoprazole 40 MG tablet  Commonly known as: PROTONIX  Take 1 tablet (40 mg total) by mouth 2 (two) times daily.     senna-docusate 8.6-50 mg 8.6-50 mg per tablet  Commonly known as: PERICOLACE  Take 1 tablet by mouth daily as needed for Constipation.     sucralfate 1 gram tablet  Commonly known as: CARAFATE  Take 1 tablet (1 g total) by mouth 4 (four) times daily before meals and nightly.     VITAMIN C ORAL  Take 1 capsule by mouth once daily.            STOP taking these medications      LORazepam 0.5 MG tablet  Commonly known as: ATIVAN     tiZANidine 2 MG tablet  Commonly known as: ZANAFLEX              Indwelling Lines/Drains at time of discharge:   Lines/Drains/Airways       None                   Time spent on the discharge of patient: 35 minutes          Deb Quiroz MD  Department of Hospital Medicine  [facility]

## 2025-01-25 NOTE — PLAN OF CARE
[facility] - Med S[facility]  Discharge Final Note    Primary Care Provider: Kezia, Primary Doctor    Expected Discharge Date: 1/25/2025    Final Discharge Note (most recent)       Final Note - 01/25/25 0926          Final Note    Assessment Type Final Discharge Note (P)      Anticipated Discharge Disposition Home or Self Care (P)      Hospital Resources/Appts/Education Provided Provided patient/caregiver with written discharge plan information;Appointments scheduled and added to AVS (P)         Post-Acute Status    Other Status No Post-Acute Service Needs (P)      Discharge Delays None known at this time (P)                      Important Message from Medicare             Contact Info       No, Primary Doctor   Relationship: PCP - General        Next Steps: Follow up in 1 week(s)          Patient will discharge home with family.     Patient will be transported home by family.     Patient scheduled with a PCP & on the AVS     Patient will be contacted by hematology & oncology department for a follow up.     Patient have no discharge/orders to address from a /CM standpoint.

## 2025-01-25 NOTE — DISCHARGE INSTRUCTIONS
You were treated for Sickle Cell Pain Crisis and blood stream infection. Your port was removed and you completed antibiotics.   You will have outpatient IR follow up for new port placement  You will need close Hematology follow up. Please call them to schedule this.

## 2025-01-25 NOTE — PLAN OF CARE
[facility]  Discharge Final Note    Primary Care Provider: Kezia, Primary Doctor    Expected Discharge Date: 1/25/2025    Final Discharge Note (most recent)       Final Note - 01/25/25 0926          Final Note    Assessment Type Final Discharge Note (P)      Anticipated Discharge Disposition Home or Self Care (P)      Hospital Resources/Appts/Education Provided Provided patient/caregiver with written discharge plan information;Appointments scheduled and added to AVS (P)         Post-Acute Status    Other Status No Post-Acute Service Needs (P)      Discharge Delays None known at this time (P)                      Important Message from Medicare             Contact Info       No, Primary Doctor   Relationship: PCP - General        Next Steps: Follow up in 1 week(s)

## 2025-01-25 NOTE — PLAN OF CARE
Problem: Adult Inpatient Plan of Care  Goal: Plan of Care Review  Outcome: Met  Goal: Patient-Specific Goal (Individualized)  Outcome: Met  Goal: Absence of Hospital-Acquired Illness or Injury  Outcome: Met  Goal: Optimal Comfort and Wellbeing  Outcome: Met  Goal: Readiness for Transition of Care  Outcome: Met     Problem: Sickle Cell Disease  Goal: Optimal Cerebral Tissue Perfusion  Outcome: Met  Goal: Optimal Coping with Sickle Cell Disease  Outcome: Met  Goal: Effective Tissue Perfusion  Outcome: Met  Goal: Absence of Infection Signs and Symptoms  Outcome: Met  Goal: Optimal Pain Control and Function  Outcome: Met  Goal: Optimal Oxygenation  Outcome: Met     Problem: Infection  Goal: Absence of Infection Signs and Symptoms  Outcome: Met     Problem: Fall Injury Risk  Goal: Absence of Fall and Fall-Related Injury  Outcome: Met     Problem: Pain Chronic (Persistent)  Goal: Optimal Pain Control and Function  Outcome: Met

## 2025-01-27 DIAGNOSIS — D57.00 SICKLE CELL ANEMIA WITH PAIN: Primary | ICD-10-CM

## 2025-01-28 DIAGNOSIS — D57.00 SICKLE CELL PAIN CRISIS: ICD-10-CM

## 2025-01-28 DIAGNOSIS — D57.00 HB-SS DISEASE WITH VASO-OCCLUSIVE PAIN: ICD-10-CM

## 2025-01-28 NOTE — TELEPHONE ENCOUNTER
----- Message from Violette sent at 1/28/2025  9:58 AM CST -----  Regarding: Appt  Contact: Pt  [phone]            Appt Type:  Hospital follow up      Date/Time Preference:  Next jose      Treating Provider: Jarred Clark MD      Caller Name: Nazanin      Contact Prefer: [phone]    Comments/Notes:   Called to schedule ,appt

## 2025-01-28 NOTE — TELEPHONE ENCOUNTER
----- Message from Violette sent at 1/28/2025  9:55 AM CST -----  Regarding: Refill  Contact: Pt  [phone]            Rx Name:    methocarbamoL (ROBAXIN) 750 MG Tab                      morphine (MS CONTIN) 30 MG 12 hr tablet                      oxyCODONE (ROXICODONE) 15 MG Tab         Preferred Pharmacy with number:    [facility]  [address] [address]  Phone: [phone] Fax: [phone]        Contact preference: [phone]      Comments/Notes:  Requesting refill

## 2025-01-29 ENCOUNTER — HOSPITAL ENCOUNTER (EMERGENCY)
Facility: HOSPITAL | Age: 45
Discharge: HOME | End: 2025-01-29
Attending: STUDENT IN AN ORGANIZED HEALTH CARE EDUCATION/TRAINING PROGRAM
Payer: MEDICARE

## 2025-01-29 VITALS
WEIGHT: 205 LBS | OXYGEN SATURATION: 98 % | BODY MASS INDEX: 37.49 KG/M2 | TEMPERATURE: 98 F | RESPIRATION RATE: 16 BRPM | DIASTOLIC BLOOD PRESSURE: 82 MMHG | SYSTOLIC BLOOD PRESSURE: 120 MMHG | HEART RATE: 85 BPM

## 2025-01-29 DIAGNOSIS — R07.9 CHEST PAIN: ICD-10-CM

## 2025-01-29 DIAGNOSIS — D57.00 SICKLE CELL PAIN CRISIS: Primary | ICD-10-CM

## 2025-01-29 LAB
ALBUMIN SERPL BCP-MCNC: 3.6 G/DL (ref 3.5–5.2)
ALP SERPL-CCNC: 80 U/L (ref 40–150)
ALT SERPL W/O P-5'-P-CCNC: 10 U/L (ref 10–44)
ANION GAP SERPL CALC-SCNC: 12 MMOL/L (ref 8–16)
AST SERPL-CCNC: 20 U/L (ref 10–40)
BASOPHILS # BLD AUTO: 0.06 K/UL (ref 0–0.2)
BASOPHILS NFR BLD: 0.9 % (ref 0–1.9)
BILIRUB SERPL-MCNC: 0.6 MG/DL (ref 0.1–1)
BNP SERPL-MCNC: 79 PG/ML (ref 0–99)
BUN SERPL-MCNC: 11 MG/DL (ref 6–20)
CALCIUM SERPL-MCNC: 9.4 MG/DL (ref 8.7–10.5)
CHLORIDE SERPL-SCNC: 108 MMOL/L (ref 95–110)
CO2 SERPL-SCNC: 20 MMOL/L (ref 23–29)
CREAT SERPL-MCNC: 0.9 MG/DL (ref 0.5–1.4)
DIFFERENTIAL METHOD BLD: ABNORMAL
EOSINOPHIL # BLD AUTO: 0.2 K/UL (ref 0–0.5)
EOSINOPHIL NFR BLD: 3 % (ref 0–8)
ERYTHROCYTE [DISTWIDTH] IN BLOOD BY AUTOMATED COUNT: 23.1 % (ref 11.5–14.5)
EST. GFR  (NO RACE VARIABLE): >60 ML/MIN/1.73 M^2
GLUCOSE SERPL-MCNC: 86 MG/DL (ref 70–110)
HCT VFR BLD AUTO: 24.8 % (ref 37–48.5)
HCV AB SERPL QL IA: NORMAL
HGB BLD-MCNC: 8.3 G/DL (ref 12–16)
HIV 1+2 AB+HIV1 P24 AG SERPL QL IA: NORMAL
IMM GRANULOCYTES # BLD AUTO: 0.01 K/UL (ref 0–0.04)
IMM GRANULOCYTES NFR BLD AUTO: 0.2 % (ref 0–0.5)
LYMPHOCYTES # BLD AUTO: 2.6 K/UL (ref 1–4.8)
LYMPHOCYTES NFR BLD: 41.4 % (ref 18–48)
MCH RBC QN AUTO: 21.9 PG (ref 27–31)
MCHC RBC AUTO-ENTMCNC: 33.5 G/DL (ref 32–36)
MCV RBC AUTO: 65 FL (ref 82–98)
MONOCYTES # BLD AUTO: 0.8 K/UL (ref 0.3–1)
MONOCYTES NFR BLD: 13.1 % (ref 4–15)
NEUTROPHILS # BLD AUTO: 2.6 K/UL (ref 1.8–7.7)
NEUTROPHILS NFR BLD: 41.4 % (ref 38–73)
NRBC BLD-RTO: 5 /100 WBC
OHS QRS DURATION: 98 MS
OHS QRS DURATION: 98 MS
OHS QTC CALCULATION: 455 MS
OHS QTC CALCULATION: 467 MS
PLATELET # BLD AUTO: 609 K/UL (ref 150–450)
PMV BLD AUTO: 9.4 FL (ref 9.2–12.9)
POTASSIUM SERPL-SCNC: 3.2 MMOL/L (ref 3.5–5.1)
PROT SERPL-MCNC: 7.8 G/DL (ref 6–8.4)
RBC # BLD AUTO: 3.79 M/UL (ref 4–5.4)
RETICS/RBC NFR AUTO: 2.7 % (ref 0.5–2.5)
SODIUM SERPL-SCNC: 140 MMOL/L (ref 136–145)
TROPONIN I SERPL DL<=0.01 NG/ML-MCNC: <3 NG/L (ref 0–14)
WBC # BLD AUTO: 6.33 K/UL (ref 3.9–12.7)

## 2025-01-29 PROCEDURE — 96375 TX/PRO/DX INJ NEW DRUG ADDON: CPT

## 2025-01-29 PROCEDURE — 93005 ELECTROCARDIOGRAM TRACING: CPT

## 2025-01-29 PROCEDURE — 96374 THER/PROPH/DIAG INJ IV PUSH: CPT

## 2025-01-29 PROCEDURE — 83880 ASSAY OF NATRIURETIC PEPTIDE: CPT | Performed by: STUDENT IN AN ORGANIZED HEALTH CARE EDUCATION/TRAINING PROGRAM

## 2025-01-29 PROCEDURE — 93010 ELECTROCARDIOGRAM REPORT: CPT | Mod: 76,,, | Performed by: INTERNAL MEDICINE

## 2025-01-29 PROCEDURE — 80053 COMPREHEN METABOLIC PANEL: CPT | Performed by: EMERGENCY MEDICINE

## 2025-01-29 PROCEDURE — 84484 ASSAY OF TROPONIN QUANT: CPT | Performed by: STUDENT IN AN ORGANIZED HEALTH CARE EDUCATION/TRAINING PROGRAM

## 2025-01-29 PROCEDURE — 99285 EMERGENCY DEPT VISIT HI MDM: CPT | Mod: 25

## 2025-01-29 PROCEDURE — 63600175 PHARM REV CODE 636 W HCPCS: Mod: JZ,TB | Performed by: STUDENT IN AN ORGANIZED HEALTH CARE EDUCATION/TRAINING PROGRAM

## 2025-01-29 PROCEDURE — 85025 COMPLETE CBC W/AUTO DIFF WBC: CPT | Performed by: EMERGENCY MEDICINE

## 2025-01-29 PROCEDURE — 25000003 PHARM REV CODE 250: Performed by: EMERGENCY MEDICINE

## 2025-01-29 PROCEDURE — 85045 AUTOMATED RETICULOCYTE COUNT: CPT | Performed by: EMERGENCY MEDICINE

## 2025-01-29 PROCEDURE — 87389 HIV-1 AG W/HIV-1&-2 AB AG IA: CPT | Performed by: PHYSICIAN ASSISTANT

## 2025-01-29 PROCEDURE — 86803 HEPATITIS C AB TEST: CPT | Performed by: PHYSICIAN ASSISTANT

## 2025-01-29 PROCEDURE — 96376 TX/PRO/DX INJ SAME DRUG ADON: CPT

## 2025-01-29 RX ORDER — MORPHINE SULFATE 30 MG/1
30 TABLET, FILM COATED, EXTENDED RELEASE ORAL EVERY 12 HOURS
Qty: 10 TABLET | Refills: 0 | Status: ON HOLD | OUTPATIENT
Start: 2025-01-29 | End: 2025-02-04

## 2025-01-29 RX ORDER — HYDROMORPHONE HYDROCHLORIDE 1 MG/ML
1 INJECTION, SOLUTION INTRAMUSCULAR; INTRAVENOUS; SUBCUTANEOUS
Status: COMPLETED | OUTPATIENT
Start: 2025-01-29 | End: 2025-01-29

## 2025-01-29 RX ORDER — OXYCODONE HYDROCHLORIDE 15 MG/1
15 TABLET ORAL EVERY 4 HOURS PRN
Qty: 42 TABLET | Refills: 0 | OUTPATIENT
Start: 2025-01-29 | End: 2025-02-05

## 2025-01-29 RX ORDER — METHOCARBAMOL 750 MG/1
750 TABLET, FILM COATED ORAL 3 TIMES DAILY PRN
Status: ON HOLD
Start: 2025-01-29

## 2025-01-29 RX ORDER — HYDROMORPHONE HYDROCHLORIDE 2 MG/1
4 TABLET ORAL
Status: COMPLETED | OUTPATIENT
Start: 2025-01-29 | End: 2025-01-29

## 2025-01-29 RX ORDER — ONDANSETRON HYDROCHLORIDE 2 MG/ML
4 INJECTION, SOLUTION INTRAVENOUS
Status: COMPLETED | OUTPATIENT
Start: 2025-01-29 | End: 2025-01-29

## 2025-01-29 RX ORDER — HYDROCODONE BITARTRATE AND ACETAMINOPHEN 10; 325 MG/1; MG/1
1 TABLET ORAL EVERY 6 HOURS PRN
Qty: 12 TABLET | Refills: 0 | Status: SHIPPED | OUTPATIENT
Start: 2025-01-29 | End: 2025-02-03

## 2025-01-29 RX ORDER — DIPHENHYDRAMINE HYDROCHLORIDE 50 MG/ML
25 INJECTION INTRAMUSCULAR; INTRAVENOUS
Status: COMPLETED | OUTPATIENT
Start: 2025-01-29 | End: 2025-01-29

## 2025-01-29 RX ADMIN — HYDROMORPHONE HYDROCHLORIDE 1 MG: 1 INJECTION, SOLUTION INTRAMUSCULAR; INTRAVENOUS; SUBCUTANEOUS at 05:01

## 2025-01-29 RX ADMIN — DIPHENHYDRAMINE HYDROCHLORIDE 25 MG: 50 INJECTION, SOLUTION INTRAMUSCULAR; INTRAVENOUS at 04:01

## 2025-01-29 RX ADMIN — HYDROMORPHONE HYDROCHLORIDE 4 MG: 2 TABLET ORAL at 08:01

## 2025-01-29 RX ADMIN — HYDROMORPHONE HYDROCHLORIDE 1 MG: 1 INJECTION, SOLUTION INTRAMUSCULAR; INTRAVENOUS; SUBCUTANEOUS at 06:01

## 2025-01-29 RX ADMIN — ONDANSETRON 4 MG: 2 INJECTION INTRAMUSCULAR; INTRAVENOUS at 04:01

## 2025-01-29 RX ADMIN — HYDROMORPHONE HYDROCHLORIDE 1 MG: 1 INJECTION, SOLUTION INTRAMUSCULAR; INTRAVENOUS; SUBCUTANEOUS at 04:01

## 2025-01-29 NOTE — ED PROVIDER NOTES
Encounter Date: 2025       History     Chief Complaint   Patient presents with    Chest Pain     L sided chest pain radiating to arm and back, +SOB. Hx of sickle cell.      44-year-old female with history of sickle cell disease, sickle cell pain crisis, history of PE on Eliquis, presents now for left-sided chest pain, sharp, radiating down the arm, ongoing for the past couple hours. She would have gone to infusion clinic but her port was removed last hospital visit secondary to infection.  Her pain meds were refilled on  and she took her home medication without relief.  She denies shortness of breath.  She denies cough, fevers or chills.  Symptoms are concerning her for recurrent sickle cell pain crisis.      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    Asthma     Avascular necrosis of femur     Depression     History of pulmonary embolism     Hypertension     Morbid obesity     Opioid dependence 2017    Pneumonia due to Streptococcus pneumoniae 2017    Right lower lobe pneumonia 2017    Sepsis due to Streptococcus pneumoniae 2017    Sickle cell-beta thalassemia disease with pain     Trigeminal neuralgia      Past Surgical History:   Procedure Laterality Date     SECTION      ESOPHAGOGASTRODUODENOSCOPY N/A 2024    Procedure: EGD (ESOPHAGOGASTRODUODENOSCOPY);  Surgeon: Allen Marshall MD;  Location: [address]);  Service: Gastroenterology;  Laterality: N/A;    TONSILLECTOMY      TUBAL LIGATION       Family History   Problem Relation Name Age of Onset    Heart disease Mother      Diabetes Mother      Heart disease Father      Breast cancer Neg Hx       Ovarian cancer Neg Hx      Colon cancer Neg Hx       Social History     Tobacco Use    Smoking status: Never    Smokeless tobacco: Never   Substance Use Topics    Alcohol use: No    Drug use: Yes     Types: Marijuana     Comment: periodically      Review of Systems   Cardiovascular:  Positive for chest pain.       Physical Exam     Initial Vitals [01/29/25 0159]   BP Pulse Resp Temp SpO2   (!) 148/76 90 18 98.8 °F (37.1 °C) 99 %      MAP       --         Physical Exam    Nursing note and vitals reviewed.  Constitutional: She appears well-developed and well-nourished. She is not diaphoretic.   HENT:   Head: Normocephalic and atraumatic. Mouth/Throat: Oropharynx is clear and moist.   Eyes: EOM are normal. Pupils are equal, round, and reactive to light. Right eye exhibits no discharge. Left eye exhibits no discharge.   Neck: No tracheal deviation present.   Normal range of motion.  Cardiovascular:  Normal rate, regular rhythm and intact distal pulses.           Pulmonary/Chest: No respiratory distress. She has no wheezes. She exhibits no tenderness.   Abdominal: Abdomen is soft. She exhibits no distension. There is no abdominal tenderness.   Musculoskeletal:         General: No tenderness or edema. Normal range of motion.      Cervical back: Normal range of motion.     Neurological: She is alert and oriented to person, place, and time. She has normal strength and normal reflexes. No cranial nerve deficit or sensory deficit. Coordination and gait normal. GCS eye subscore is 4. GCS verbal subscore is 5. GCS motor subscore is 6.   Normal finger to nose, heel to shin, rapid alternating movement.    Skin: Skin is warm and dry. No rash noted.   Psychiatric: She has a normal mood and affect. Her behavior is normal. Thought content normal.         ED Course   Procedures  Labs Reviewed   CBC W/ AUTO DIFFERENTIAL - Abnormal       Result Value    WBC 6.33      RBC 3.79 (*)     Hemoglobin 8.3 (*)     Hematocrit 24.8 (*)     MCV  65 (*)     MCH 21.9 (*)     MCHC 33.5      RDW 23.1 (*)     Platelets 609 (*)     MPV 9.4      Immature Granulocytes 0.2      Gran # (ANC) 2.6      Immature Grans (Abs) 0.01      Lymph # 2.6      Mono # 0.8      Eos # 0.2      Baso # 0.06      nRBC 5 (*)     Gran % 41.4      Lymph % 41.4      Mono % 13.1      Eosinophil % 3.0      Basophil % 0.9      Differential Method Automated     COMPREHENSIVE METABOLIC PANEL - Abnormal    Sodium 140      Potassium 3.2 (*)     Chloride 108      CO2 20 (*)     Glucose 86      BUN 11      Creatinine 0.9      Calcium 9.4      Total Protein 7.8      Albumin 3.6      Total Bilirubin 0.6      Alkaline Phosphatase 80      AST 20      ALT 10      eGFR >60.0      Anion Gap 12     RETICULOCYTES - Abnormal    Retic 2.7 (*)    HEPATITIS C ANTIBODY    Hepatitis C Ab Non-reactive      Narrative:     Release to patient->Immediate   HIV 1 / 2 ANTIBODY    HIV 1/2 Ag/Ab Non-reactive      Narrative:     Release to patient->Immediate   TROPONIN I HIGH SENSITIVITY    Troponin I High Sensitivity <3     B-TYPE NATRIURETIC PEPTIDE    BNP 79       EKG Readings: (Independently Interpreted)   EKG with regular rate, regular rhythm, normal axis, no acute ST elevations or depressions, normal KS, QRS and QT interval. Interpreted by me.         Imaging Results              X-Ray Chest AP Portable (Final result)  Result time 01/29/25 06:00:54      Final result by Jill Daugherty MD (01/29/25 06:00:54)                   Impression:      Shallow inspiratory effort with atelectasis in the lung bases but otherwise unremarkable.      Electronically signed by: Jill Daugherty  Date:    01/29/2025  Time:    06:00               Narrative:    EXAMINATION:  XR CHEST AP PORTABLE    CLINICAL HISTORY:  . Chest pain, unspecified    TECHNIQUE:  Single frontal portable view of the chest was performed.    COMPARISON:  Prior study dated January 10, 2025    FINDINGS:  Support devices: Interval removal of the right  internal jugular line is noted.    Penetration of the lung fields technique 1 is is limited but study remains diagnostic.There is atelectasis in the lung bases and scalloping of the right hemidiaphragm again noted.  Subsegmental atelectasis is also seen in the left base.  No confluent lung opacity is noted.  Trachea is midline.  Cardiac silhouette is stable.  There is no pneumothorax and no pleural effusion.    Bones are intact.                                    X-Rays:   Independently Interpreted Readings:   Other Readings:  I reviewed the CXR independently of the radiologist.     Chest x-ray was negative for acute cardiopulmonary abnormalities, infiltrates or bony abnormalities.        Medications   HYDROmorphone injection 1 mg (has no administration in time range)   HYDROmorphone injection 1 mg (1 mg Intravenous Given 1/29/25 0443)   ondansetron injection 4 mg (4 mg Intravenous Given 1/29/25 0444)   diphenhydrAMINE injection 25 mg (25 mg Intravenous Given 1/29/25 0443)   HYDROmorphone injection 1 mg (1 mg Intravenous Given 1/29/25 0559)     Medical Decision Making  44 year old female with sickle cell beta thalassemia presents for evaluation of chest pain consistent with their sickle cell pain crisis. Vitals are within normal limits. Patient uncomfortable, but not ill appearing. Vital signs and complaint do not suggest sepsis. She would have gone to infusion clinic, but her port was recently removed,  and will not get another one until 02/10/25. CXR without evidence of infiltrate, presentation inconsistent with acute chest syndrome. Labs notable for reticulocyte count of 2.7, patient is not acutely anemic to require blood transfusion. Patient's pain was treated with three rounds of IV pain medication and was still in pain. Pain remains poorly controlled and I will admit them to hospital medicine for further management.       Amount and/or Complexity of Data Reviewed  Labs: ordered.  Radiology:  ordered.    Risk  Prescription drug management.                                      Clinical Impression:  Final diagnoses:  [R07.9] Chest pain  [D57.00] Sickle cell pain crisis (Primary)          ED Disposition Condition    Observation Stable                Landon Hebert MD  01/29/25 0615

## 2025-01-29 NOTE — HPI
"Nazanin Malone is a 44-year-old female with sickle cell beta thalassemia zero, multiple pulmonary emboli on chronic AC, asthma, and HTN who presents [facility] for sickle cell pain crisis. She was recently hospitalized from 12/29/24-1/5/25 for pain crisis then admitted from 1/7/2025-1/5/25 for pain crisis and bacteremia which resolved after removal of her port. She was unable to get port exchange during admission as it was preferred to do outpatient and she would continue to have discussions with her heme onc team about exchange transfusions. Of note on her admissions she has talked to nurses about getting more medication prescribed stating "no one has to know" and asked if nursing needed help throwing away an empty flush syringe and empty med syringe. On the admission of 1/7/2025 she was seen by nursing setting her alarm on her phone for her PCA pump. At time of discharge she was continuously asking for IV benadryl and dilaudid even after PCA pump had been stopped. Reviewing her labs in the ED her H&H is stable if not improved from DC and retics are mildly elevated at 2.7. Otherwise no significant abnormalities. Medicine consulted for recommendations of pain crisis.      "

## 2025-01-29 NOTE — PLAN OF CARE
[facility] - Emergency Dept  Discharge Assessment    Primary Care Provider: No, Primary Doctor     Pt stated she is independent with her ambulation and ADL's and does not require assistance or equipment    Pt to d/c home with no needs when ready    Discharge Assessment (most recent)       BRIEF DISCHARGE ASSESSMENT - 01/29/25 5778          Discharge Planning    Assessment Type Discharge Planning Brief Assessment (P)      Resource/Environmental Concerns none (P)      Support Systems Family members;Friends/neighbors (P)      Equipment Currently Used at Home none (P)      Current Living Arrangements home (P)      Patient/Family Anticipates Transition to home (P)      Patient/Family Anticipated Services at Transition none (P)      DME Needed Upon Discharge  none (P)      Discharge Plan A Home (P)      Discharge Plan B Home (P)                    Mackenzie Wong CD, MSW, LMSW, RSW   Case Management  [facility]  Email: [email or web address]

## 2025-01-29 NOTE — ASSESSMENT & PLAN NOTE
-pt with multiple recent admissions for pain crisis   -most recent admission was 17 days, there has been documentation as stated in the HPI of patient asking nurses for extra dilaudid doses and using her phone to make sure she is continuously clicking on her PCA pump even when labs are showing improvement.   -patient was recently on high doses of dilaudid with similar symptoms in the ED. She was also demanding dilaudid prior to discharge even when PCA was stopped a few hours prior to discharge for which I was the discharging Physician on last admission  -symptoms, exams, and labs are similar to patient at the time of discharge   -at this time I have high concern for drug seeking behavior and do not believe there is benefit from inpatient admission. Labs are stable and no indication for transfusion. No signs of infection. She will not be able to get a port on admission either as IR prefers this outpatient  -recommend DC home with Heme Onc follow up and IR follow up for port placement   -continue pain medications at home   -I do not believe there will be medical value to continuing IV pain medication in the hospital

## 2025-01-29 NOTE — SUBJECTIVE & OBJECTIVE
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    Asthma     Avascular necrosis of femur     Depression     History of pulmonary embolism     Hypertension     Morbid obesity     Opioid dependence 2017    Pneumonia due to Streptococcus pneumoniae 2017    Right lower lobe pneumonia 2017    Sepsis due to Streptococcus pneumoniae 2017    Sickle cell-beta thalassemia disease with pain     Trigeminal neuralgia        Past Surgical History:   Procedure Laterality Date     SECTION      ESOPHAGOGASTRODUODENOSCOPY N/A 2024    Procedure: EGD (ESOPHAGOGASTRODUODENOSCOPY);  Surgeon: Allen Marshall MD;  Location: [address]);  Service: Gastroenterology;  Laterality: N/A;    TONSILLECTOMY      TUBAL LIGATION         Review of patient's allergies indicates:   Allergen Reactions    Cat dander Anaphylaxis, Itching and Shortness Of Breath    Nsaids (non-steroidal anti-inflammatory drug) Itching and Anaphylaxis    Latex Rash       No current facility-administered medications on file prior to encounter.     Current Outpatient Medications on File Prior to Encounter   Medication Sig    acetaminophen (TYLENOL) 500 MG tablet Take 1,000 mg by mouth every 8 (eight) hours as needed for Pain.    albuterol (VENTOLIN HFA) 90 mcg/actuation inhaler Inhale 2 puffs into the lungs every 6 (six) hours as needed for Wheezing or Shortness of Breath. Rescue    amLODIPine (NORVASC) 10 MG tablet Take 1 tablet (10 mg total) by mouth once daily.    apixaban (ELIQUIS) 5 mg Tab Take 1 tablet (5 mg total) by mouth 2 (two) times daily.    ascorbic acid (VITAMIN C ORAL) Take 1 capsule by mouth once daily.    FLUoxetine 40 MG capsule Take 1 capsule (40 mg total) by mouth once daily.    fluticasone propionate (FLONASE) 50 mcg/actuation nasal spray INSTILL 1 SPRAY INTO EACH NOSTRIL EVERY DAY    folic acid (FOLVITE) 1 MG  tablet Take 1 tablet (1 mg total) by mouth once daily.    gabapentin (NEURONTIN) 300 MG capsule Take 2 capsules (600 mg total) by mouth 3 (three) times daily.    hydroxyurea (HYDREA) 500 mg Cap Take 2 capsules (1,000 mg total) by mouth once daily.    hydrOXYzine pamoate (VISTARIL) 25 MG Cap Take 1 capsule (25 mg total) by mouth every 4 (four) hours as needed (Anxiety).    methocarbamoL (ROBAXIN) 750 MG Tab Take 1 tablet (750 mg total) by mouth 3 (three) times daily as needed (muscle pain).    metoclopramide HCl (REGLAN) 5 MG tablet Take 1 tablet (5 mg total) by mouth 3 (three) times daily before meals.    morphine (MS CONTIN) 30 MG 12 hr tablet Take 1 tablet (30 mg total) by mouth every 12 (twelve) hours. for 5 days    oxyCODONE (ROXICODONE) 15 MG Tab Take 1 tablet (15 mg total) by mouth every 4 (four) hours as needed for Pain.    pantoprazole (PROTONIX) 40 MG tablet Take 1 tablet (40 mg total) by mouth 2 (two) times daily.    [] promethazine (PHENERGAN) 12.5 MG Tab Take 2 tablets (25 mg total) by mouth every 6 (six) hours as needed (nausea).    senna-docusate 8.6-50 mg (PERICOLACE) 8.6-50 mg per tablet Take 1 tablet by mouth daily as needed for Constipation.    sucralfate (CARAFATE) 1 gram tablet Take 1 tablet (1 g total) by mouth 4 (four) times daily before meals and nightly.     Family History       Problem Relation (Age of Onset)    Diabetes Mother    Heart disease Mother, Father          Tobacco Use    Smoking status: Never    Smokeless tobacco: Never   Substance and Sexual Activity    Alcohol use: No    Drug use: Yes     Types: Marijuana     Comment: periodically     Sexual activity: Not Currently     Birth control/protection: See Surgical Hx     Review of Systems   All other systems reviewed and are negative.    Objective:     Vital Signs (Most Recent):  Temp: 98 °F (36.7 °C) (25 0700)  Pulse: 85 (25 0700)  Resp: 16 (25 0700)  BP: 120/82 (25 0700)  SpO2: 98 % (25 0700)  Vital Signs (24h Range):  Temp:  [98 °F (36.7 °C)-98.8 °F (37.1 °C)] 98 °F (36.7 °C)  Pulse:  [85-90] 85  Resp:  [16-18] 16  SpO2:  [97 %-99 %] 98 %  BP: (120-148)/(76-89) 120/82     Weight: 93 kg (205 lb)  Body mass index is 37.49 kg/m².     Physical Exam  Constitutional:       Appearance: Normal appearance.   HENT:      Head: Normocephalic and atraumatic.      Nose: Nose normal.      Mouth/Throat:      Mouth: Mucous membranes are moist.   Eyes:      Extraocular Movements: Extraocular movements intact.      Pupils: Pupils are equal, round, and reactive to light.   Cardiovascular:      Rate and Rhythm: Normal rate and regular rhythm.      Heart sounds: No murmur heard.     No gallop.   Pulmonary:      Effort: Pulmonary effort is normal.      Breath sounds: Normal breath sounds. No wheezing or rales.   Abdominal:      General: Abdomen is flat. Bowel sounds are normal. There is no distension.      Palpations: Abdomen is soft.      Tenderness: There is no abdominal tenderness.   Musculoskeletal:         General: No swelling or tenderness. Normal range of motion.      Cervical back: Normal range of motion and neck supple.      Right lower leg: No edema.      Left lower leg: No edema.   Skin:     General: Skin is warm and dry.      Capillary Refill: Capillary refill takes less than 2 seconds.   Neurological:      General: No focal deficit present.      Mental Status: She is alert. Mental status is at baseline.   Psychiatric:         Mood and Affect: Mood normal.          Significant Labs: All pertinent labs within the past 24 hours have been reviewed.    Significant Imaging: I have reviewed all pertinent imaging results/findings within the past 24 hours.

## 2025-01-29 NOTE — CONSULTS
"[facility] - Emergency Dept  Hospital Medicine  Consult Note    Patient Name: Nazanin Malone  MRN: [id number]  Admission Date: 1/29/2025  Hospital Length of Stay: 0 days  Attending Physician: Landon Hebert MD   Primary Care Provider: Ke[facility] Primary Doctor           Patient information was obtained from patient and ER records.     Consults  Subjective:     Principal Problem: <principal problem not specified>    Chief Complaint:   Chief Complaint   Patient presents with    Chest Pain     L sided chest pain radiating to arm and back, +SOB. Hx of sickle cell.         HPI: Nazanin Malone is a 44-year-old female with sickle cell beta thalassemia zero, multiple pulmonary emboli on chronic AC, asthma, and HTN who [place] [facility] [place] for sickle cell pain crisis. She was recently hospitalized from 12/29/24-1/5/25 for pain crisis then admitted from 1/7/2025-1/5/25 for pain crisis and bacteremia which resolved after removal of her port. She was unable to get port exchange during admission as it was preferred to do outpatient and she would continue to have discussions with her heme onc team about exchange transfusions. Of note on her admissions she has talked to nurses about getting more medication prescribed stating "no one has to know" and asked if nursing needed help throwing away an empty flush syringe and empty med syringe. On the admission of 1/7/2025 she was seen by nursing setting her alarm on her phone for her PCA pump. At time of discharge she was continuously asking for IV benadryl and dilaudid even after PCA pump had been stopped. Reviewing her labs in the ED her H&H is stable if not improved from DC and retics are mildly elevated at 2.7. Otherwise no significant abnormalities. Medicine consulted for recommendations of pain crisis.        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/2017    Asthma     Avascular " necrosis of femur     Depression     History of pulmonary embolism     Hypertension     Morbid obesity     Opioid dependence 2017    Pneumonia due to Streptococcus pneumoniae 2017    Right lower lobe pneumonia 2017    Sepsis due to Streptococcus pneumoniae 2017    Sickle cell-beta thalassemia disease with pain     Trigeminal neuralgia        Past Surgical History:   Procedure Laterality Date     SECTION      ESOPHAGOGASTRODUODENOSCOPY N/A 2024    Procedure: EGD (ESOPHAGOGASTRODUODENOSCOPY);  Surgeon: Allen Marshall MD;  Location: [facility] ([address] [place] [address]);  Service: Gastroenterology;  Laterality: N/A;    TONSILLECTOMY      TUBAL LIGATION         Review of patient's allergies indicates:   Allergen Reactions    Cat dander Anaphylaxis, Itching and Shortness Of Breath    Nsaids (non-steroidal anti-inflammatory drug) Itching and Anaphylaxis    Latex Rash       No current facility-administered medications on file prior to encounter.     Current Outpatient Medications on File Prior to Encounter   Medication Sig    acetaminophen (TYLENOL) 500 MG tablet Take 1,000 mg by mouth every 8 (eight) hours as needed for Pain.    albuterol (VENTOLIN HFA) 90 mcg/actuation inhaler Inhale 2 puffs into the lungs every 6 (six) hours as needed for Wheezing or Shortness of Breath. Rescue    amLODIPine (NORVASC) 10 MG tablet Take 1 tablet (10 mg total) by mouth once daily.    apixaban (ELIQUIS) 5 mg Tab Take 1 tablet (5 mg total) by mouth 2 (two) times daily.    ascorbic acid (VITAMIN C ORAL) Take 1 capsule by mouth once daily.    FLUoxetine 40 MG capsule Take 1 capsule (40 mg total) by mouth once daily.    fluticasone propionate (FLONASE) 50 mcg/actuation nasal spray INSTILL 1 SPRAY INTO EACH NOSTRIL EVERY DAY    folic acid (FOLVITE) 1 MG tablet Take 1 tablet (1 mg total) by mouth once daily.    gabapentin (NEURONTIN) 300 MG capsule Take 2 capsules (600 mg total) by mouth 3 (three) times daily.    hydroxyurea  (HYDREA) 500 mg Cap Take 2 capsules (1,000 mg total) by mouth once daily.    hydrOXYzine pamoate (VISTARIL) 25 MG Cap Take 1 capsule (25 mg total) by mouth every 4 (four) hours as needed (Anxiety).    methocarbamoL (ROBAXIN) 750 MG Tab Take 1 tablet (750 mg total) by mouth 3 (three) times daily as needed (muscle pain).    metoclopramide HCl (REGLAN) 5 MG tablet Take 1 tablet (5 mg total) by mouth 3 (three) times daily before meals.    morphine (MS CONTIN) 30 MG 12 hr tablet Take 1 tablet (30 mg total) by mouth every 12 (twelve) hours. for 5 days    oxyCODONE (ROXICODONE) 15 MG Tab Take 1 tablet (15 mg total) by mouth every 4 (four) hours as needed for Pain.    pantoprazole (PROTONIX) 40 MG tablet Take 1 tablet (40 mg total) by mouth 2 (two) times daily.    [] promethazine (PHENERGAN) 12.5 MG Tab Take 2 tablets (25 mg total) by mouth every 6 (six) hours as needed (nausea).    senna-docusate 8.6-50 mg (PERICOLACE) 8.6-50 mg per tablet Take 1 tablet by mouth daily as needed for Constipation.    sucralfate (CARAFATE) 1 gram tablet Take 1 tablet (1 g total) by mouth 4 (four) times daily before meals and nightly.     Family History       Problem Relation (Age of Onset)    Diabetes Mother    Heart disease Mother, Father          Tobacco Use    Smoking status: Never    Smokeless tobacco: Never   Substance and Sexual Activity    Alcohol use: No    Drug use: Yes     Types: Marijuana     Comment: periodically     Sexual activity: Not Currently     Birth control/protection: See Surgical Hx     Review of Systems   All other systems reviewed and are negative.    Objective:     Vital Signs (Most Recent):  Temp: 98 °F (36.7 °C) (25 0700)  Pulse: 85 (25 07)  Resp: 16 (25 07)  BP: 120/82 (25 07)  SpO2: 98 % (25 07) Vital Signs (24h Range):  Temp:  [98 °F (36.7 °C)-98.8 °F (37.1 °C)] 98 °F (36.7 °C)  Pulse:  [85-90] 85  Resp:  [16-18] 16  SpO2:  [97 %-99 %] 98 %  BP: (120-148)/(76-89)  120/82     Weight: 93 kg (205 lb)  Body mass index is 37.49 kg/m².     Physical Exam  Constitutional:       Appearance: Normal appearance.   HENT:      Head: Normocephalic and atraumatic.      Nose: Nose normal.      Mouth/Throat:      Mouth: Mucous membranes are moist.   Eyes:      Extraocular Movements: Extraocular movements intact.      Pupils: Pupils are equal, round, and reactive to light.   Cardiovascular:      Rate and Rhythm: Normal rate and regular rhythm.      Heart sounds: No murmur heard.     No gallop.   Pulmonary:      Effort: Pulmonary effort is normal.      Breath sounds: Normal breath sounds. No wheezing or rales.   Abdominal:      General: Abdomen is flat. Bowel sounds are normal. There is no distension.      Palpations: Abdomen is soft.      Tenderness: There is no abdominal tenderness.   Musculoskeletal:         General: No swelling or tenderness. Normal range of motion.      Cervical back: Normal range of motion and neck supple.      Right lower leg: No edema.      Left lower leg: No edema.   Skin:     General: Skin is warm and dry.      Capillary Refill: Capillary refill takes less than 2 seconds.   Neurological:      General: No focal deficit present.      Mental Status: She is alert. Mental status is at baseline.   Psychiatric:         Mood and Affect: Mood normal.          Significant Labs: All pertinent labs within the past 24 hours have been reviewed.    Significant Imaging: I have reviewed all pertinent imaging results/findings within the past 24 hours.  Assessment/Plan:     Sickle cell disease, type S beta-zero thalassemia with crisis  -pt with multiple recent admissions for pain crisis   -most recent admission was 17 days, there has been documentation as stated in the HPI of patient asking nurses for extra dilaudid doses and using her phone to make sure she is continuously clicking on her PCA pump even when labs are showing improvement.   -patient was recently on high doses of dilaudid  with similar symptoms to what she is presenting in the ED. She was also demanding dilaudid prior to discharge even when PCA was stopped a few hours prior to discharge for which I was the discharging Physician on last admission  -symptoms, exams, and labs are similar to patient at the time of discharge   -at this time I have high concern for drug seeking behavior and do not believe there is benefit from inpatient admission. Labs are stable and no indication for transfusion. No signs of infection. She will not be able to get a port on admission either as IR prefers this outpatient  -recommend DC home with Heme Onc follow up and IR follow up for port placement   -continue pain medications at home   -I do not believe there will be medical value to continuing IV pain medication in the hospital       Drug dependence  -as above       VTE Risk Mitigation (From admission, onward)      None                Thank you for your consult. I will sign off. Please contact us if you have any additional questions.    Deb Quiroz MD  Department of Hospital Medicine   [facility] - Emergency Dept

## 2025-01-29 NOTE — DISCHARGE INSTRUCTIONS
Thank you for coming to our Emergency Department today. It is important to remember that some problems or medical conditions are difficult to diagnose and may not be found during your Emergency Department visit.     Be sure to follow up with your primary care doctor and review all labs/imaging/tests that were performed during your ER visit with them. Some labs/tests may be outside of the normal range and require non-emergent follow-up and further investigation to help diagnose/exclude/prevent complications or other potentially serious medical conditions that were not addressed during your ER visit.    If you do not have a primary care doctor, you may contact the one listed on your discharge paperwork or you may also call the [facility] Appointment Desk at [phone] to schedule an appointment and establish care with one. Another resources for finding primary care physicians: [email or web address] It is important to your health that you have a primary care doctor.    Please take all medications as directed. All medications may potentially have side-effects and it is impossible to predict which medications may give you side-effects or what side-effects (if any) they will give you. If you feel that you are having a negative effect or side-effect of any medication you should immediately stop taking them and seek medical attention. If you feel that you are having a life-threatening reaction call 911.    Return to the ER with any questions/concerns, new/concerning symptoms, worsening or failure to improve.     Do not drive, swim, climb to height, take a bath, operate heavy machinery, drink alcohol or take potentially sedating medications, sign any legal documents or make any important decisions for 24 hours if you have received any pain medications, sedatives or mood altering drugs during your ER visit or within 24 hours of taking them if they have been prescribed to you.     You can find additional resources for  Dentists, hearing aids, durable medical equipment, low cost pharmacies and other resources at [email or web address]

## 2025-01-30 ENCOUNTER — PATIENT OUTREACH (OUTPATIENT)
Dept: ADMINISTRATIVE | Facility: CLINIC | Age: 45
End: 2025-01-30
Payer: MEDICARE

## 2025-01-30 NOTE — PROGRESS NOTES
C3 nurse attempted to contact Nazanin Malone for a TCC post hospital discharge follow up call. No answer. No voicemail available. The patient has a scheduled HOSFU appointment with Marie Mohr NP on 02/03/25 @ 1375.

## 2025-01-31 ENCOUNTER — TELEPHONE (OUTPATIENT)
Dept: HEMATOLOGY/ONCOLOGY | Facility: CLINIC | Age: 45
End: 2025-01-31
Payer: MEDICARE

## 2025-01-31 NOTE — TELEPHONE ENCOUNTER
"----- Message from Jesse sent at 1/31/2025  2:39 PM CST -----  Consult/Advisory    Name Of Caller: Self    Contact Preference?: [phone]    Provider Name: Eduardo    What is the nature of the call?: Requesting clarification about why she only received 10 tablets for refill of morphine (MS CONTIN) 30 MG 12 hr tablet.        Refill Request      RX Name and Strength:  oxyCODONE (ROXICODONE) 15 MG Tab        How is the patient currently taking it?  Take 1 tablet (15 mg total) by mouth every 4 (four) hours as needed for Pain. - Oral      Is this a 30 day or 90 day Rx?  42 tablet      Preferred Pharmacy with phone number:     [facility]  [address] [facility] [address]  Phone: [phone] Fax: [phone]      Local or Mail Order: Local      Ordering Provider: Eduardo      Contact Preference: [phone]       Additional Information:  "Thank you for all that you do for our patients"  "

## 2025-01-31 NOTE — TELEPHONE ENCOUNTER
"Advised pt morphine and Coral was sent in on 1/29. Pt said "you guys are treatment me like some drug addict and in the hospital they were horrible to me". Pt reports "they aren't letting me pick it up because Dr. Clark needs to tell them I can get it". Apologized for an negative experience with [facility] and advised Dr. Clark is not in clinic currently and can discuss with him on Monday. Pt said "I don't want to get ugly with you but you need to get your act together. Call me whenever you get your act together. I never had any problem with Dr. Montgomery." Pt abruptly disconnected call. Will continue to monitor and follow up with provider.  "

## 2025-02-02 ENCOUNTER — HOSPITAL ENCOUNTER (INPATIENT)
Facility: HOSPITAL | Age: 45
LOS: 3 days | Discharge: HOME | DRG: 812 | End: 2025-02-06
Attending: STUDENT IN AN ORGANIZED HEALTH CARE EDUCATION/TRAINING PROGRAM | Admitting: HOSPITALIST
Payer: MEDICARE

## 2025-02-02 DIAGNOSIS — D57.00 SICKLE CELL PAIN CRISIS: Primary | ICD-10-CM

## 2025-02-02 DIAGNOSIS — E53.8 FOLATE DEFICIENCY: ICD-10-CM

## 2025-02-02 DIAGNOSIS — D57.00 VASO-OCCLUSIVE PAIN DUE TO SICKLE CELL DISEASE: ICD-10-CM

## 2025-02-02 LAB
ALBUMIN SERPL BCP-MCNC: 4.5 G/DL (ref 3.5–5.2)
ALP SERPL-CCNC: 98 U/L (ref 40–150)
ALT SERPL W/O P-5'-P-CCNC: 12 U/L (ref 10–44)
ANION GAP SERPL CALC-SCNC: 14 MMOL/L (ref 8–16)
AST SERPL-CCNC: 23 U/L (ref 10–40)
BASOPHILS # BLD AUTO: 0.05 K/UL (ref 0–0.2)
BASOPHILS NFR BLD: 0.8 % (ref 0–1.9)
BILIRUB SERPL-MCNC: 1.1 MG/DL (ref 0.1–1)
BUN SERPL-MCNC: 8 MG/DL (ref 6–20)
CALCIUM SERPL-MCNC: 10.5 MG/DL (ref 8.7–10.5)
CHLORIDE SERPL-SCNC: 108 MMOL/L (ref 95–110)
CO2 SERPL-SCNC: 19 MMOL/L (ref 23–29)
CREAT SERPL-MCNC: 0.9 MG/DL (ref 0.5–1.4)
DIFFERENTIAL METHOD BLD: ABNORMAL
EOSINOPHIL # BLD AUTO: 0.1 K/UL (ref 0–0.5)
EOSINOPHIL NFR BLD: 1 % (ref 0–8)
ERYTHROCYTE [DISTWIDTH] IN BLOOD BY AUTOMATED COUNT: 23.1 % (ref 11.5–14.5)
EST. GFR  (NO RACE VARIABLE): >60 ML/MIN/1.73 M^2
GLUCOSE SERPL-MCNC: 97 MG/DL (ref 70–110)
HAPTOGLOB SERPL-MCNC: 43 MG/DL (ref 30–250)
HCT VFR BLD AUTO: 24.8 % (ref 37–48.5)
HGB BLD-MCNC: 8.2 G/DL (ref 12–16)
IMM GRANULOCYTES # BLD AUTO: 0.01 K/UL (ref 0–0.04)
IMM GRANULOCYTES NFR BLD AUTO: 0.2 % (ref 0–0.5)
LDH SERPL L TO P-CCNC: 230 U/L (ref 110–260)
LYMPHOCYTES # BLD AUTO: 2.9 K/UL (ref 1–4.8)
LYMPHOCYTES NFR BLD: 46.4 % (ref 18–48)
MCH RBC QN AUTO: 21.7 PG (ref 27–31)
MCHC RBC AUTO-ENTMCNC: 33.1 G/DL (ref 32–36)
MCV RBC AUTO: 66 FL (ref 82–98)
MONOCYTES # BLD AUTO: 0.6 K/UL (ref 0.3–1)
MONOCYTES NFR BLD: 9.5 % (ref 4–15)
NEUTROPHILS # BLD AUTO: 2.6 K/UL (ref 1.8–7.7)
NEUTROPHILS NFR BLD: 42.1 % (ref 38–73)
NRBC BLD-RTO: 2 /100 WBC
PLATELET # BLD AUTO: 357 K/UL (ref 150–450)
PMV BLD AUTO: 8.9 FL (ref 9.2–12.9)
POTASSIUM SERPL-SCNC: 3.7 MMOL/L (ref 3.5–5.1)
PROT SERPL-MCNC: 9.6 G/DL (ref 6–8.4)
RBC # BLD AUTO: 3.78 M/UL (ref 4–5.4)
RETICS/RBC NFR AUTO: 1.4 % (ref 0.5–2.5)
SODIUM SERPL-SCNC: 141 MMOL/L (ref 136–145)
WBC # BLD AUTO: 6.23 K/UL (ref 3.9–12.7)

## 2025-02-02 PROCEDURE — 94761 N-INVAS EAR/PLS OXIMETRY MLT: CPT

## 2025-02-02 PROCEDURE — 25000003 PHARM REV CODE 250: Performed by: EMERGENCY MEDICINE

## 2025-02-02 PROCEDURE — G0378 HOSPITAL OBSERVATION PER HR: HCPCS

## 2025-02-02 PROCEDURE — 96375 TX/PRO/DX INJ NEW DRUG ADDON: CPT

## 2025-02-02 PROCEDURE — 25000003 PHARM REV CODE 250

## 2025-02-02 PROCEDURE — 96365 THER/PROPH/DIAG IV INF INIT: CPT

## 2025-02-02 PROCEDURE — 96376 TX/PRO/DX INJ SAME DRUG ADON: CPT

## 2025-02-02 PROCEDURE — 63600175 PHARM REV CODE 636 W HCPCS: Mod: TB,JZ | Performed by: EMERGENCY MEDICINE

## 2025-02-02 PROCEDURE — 99285 EMERGENCY DEPT VISIT HI MDM: CPT | Mod: 25

## 2025-02-02 PROCEDURE — 81003 URINALYSIS AUTO W/O SCOPE: CPT

## 2025-02-02 PROCEDURE — 85045 AUTOMATED RETICULOCYTE COUNT: CPT

## 2025-02-02 PROCEDURE — 85025 COMPLETE CBC W/AUTO DIFF WBC: CPT

## 2025-02-02 PROCEDURE — 83010 ASSAY OF HAPTOGLOBIN QUANT: CPT

## 2025-02-02 PROCEDURE — 63600175 PHARM REV CODE 636 W HCPCS: Mod: TB,JZ

## 2025-02-02 PROCEDURE — 96374 THER/PROPH/DIAG INJ IV PUSH: CPT | Mod: 59

## 2025-02-02 PROCEDURE — 25000003 PHARM REV CODE 250: Performed by: STUDENT IN AN ORGANIZED HEALTH CARE EDUCATION/TRAINING PROGRAM

## 2025-02-02 PROCEDURE — 83615 LACTATE (LD) (LDH) ENZYME: CPT

## 2025-02-02 PROCEDURE — 80053 COMPREHEN METABOLIC PANEL: CPT

## 2025-02-02 RX ORDER — ALBUTEROL SULFATE 90 UG/1
2 INHALANT RESPIRATORY (INHALATION) EVERY 6 HOURS PRN
Status: DISCONTINUED | OUTPATIENT
Start: 2025-02-02 | End: 2025-02-06 | Stop reason: HOSPADM

## 2025-02-02 RX ORDER — DIPHENHYDRAMINE HCL 25 MG
25 CAPSULE ORAL EVERY 4 HOURS PRN
Status: DISCONTINUED | OUTPATIENT
Start: 2025-02-02 | End: 2025-02-06 | Stop reason: HOSPADM

## 2025-02-02 RX ORDER — DIPHENHYDRAMINE HCL 25 MG
25 CAPSULE ORAL
Status: COMPLETED | OUTPATIENT
Start: 2025-02-02 | End: 2025-02-02

## 2025-02-02 RX ORDER — HYDROMORPHONE HYDROCHLORIDE 1 MG/ML
2 INJECTION, SOLUTION INTRAMUSCULAR; INTRAVENOUS; SUBCUTANEOUS
Status: COMPLETED | OUTPATIENT
Start: 2025-02-02 | End: 2025-02-02

## 2025-02-02 RX ORDER — HYDROMORPHONE HYDROCHLORIDE 1 MG/ML
0.5 INJECTION, SOLUTION INTRAMUSCULAR; INTRAVENOUS; SUBCUTANEOUS ONCE
Status: COMPLETED | OUTPATIENT
Start: 2025-02-02 | End: 2025-02-02

## 2025-02-02 RX ORDER — HYDROMORPHONE HYDROCHLORIDE 1 MG/ML
1 INJECTION, SOLUTION INTRAMUSCULAR; INTRAVENOUS; SUBCUTANEOUS
Status: DISCONTINUED | OUTPATIENT
Start: 2025-02-02 | End: 2025-02-02

## 2025-02-02 RX ORDER — FOLIC ACID 1 MG/1
1 TABLET ORAL DAILY
Status: DISCONTINUED | OUTPATIENT
Start: 2025-02-03 | End: 2025-02-06 | Stop reason: HOSPADM

## 2025-02-02 RX ORDER — FLUOXETINE HYDROCHLORIDE 20 MG/1
40 CAPSULE ORAL DAILY
Status: DISCONTINUED | OUTPATIENT
Start: 2025-02-03 | End: 2025-02-06 | Stop reason: HOSPADM

## 2025-02-02 RX ORDER — PROCHLORPERAZINE MALEATE 10 MG
10 TABLET ORAL EVERY 6 HOURS PRN
Status: DISCONTINUED | OUTPATIENT
Start: 2025-02-02 | End: 2025-02-06 | Stop reason: HOSPADM

## 2025-02-02 RX ORDER — PANTOPRAZOLE SODIUM 40 MG/1
40 TABLET, DELAYED RELEASE ORAL DAILY
Status: DISCONTINUED | OUTPATIENT
Start: 2025-02-03 | End: 2025-02-06 | Stop reason: HOSPADM

## 2025-02-02 RX ORDER — ONDANSETRON HYDROCHLORIDE 2 MG/ML
4 INJECTION, SOLUTION INTRAVENOUS
Status: COMPLETED | OUTPATIENT
Start: 2025-02-02 | End: 2025-02-02

## 2025-02-02 RX ORDER — NALOXONE HCL 0.4 MG/ML
0.02 VIAL (ML) INJECTION
Status: DISCONTINUED | OUTPATIENT
Start: 2025-02-02 | End: 2025-02-06 | Stop reason: HOSPADM

## 2025-02-02 RX ORDER — ACETAMINOPHEN 500 MG
1000 TABLET ORAL 3 TIMES DAILY
Status: DISCONTINUED | OUTPATIENT
Start: 2025-02-02 | End: 2025-02-06 | Stop reason: HOSPADM

## 2025-02-02 RX ORDER — HYDROMORPHONE HYDROCHLORIDE 1 MG/ML
1 INJECTION, SOLUTION INTRAMUSCULAR; INTRAVENOUS; SUBCUTANEOUS
Status: COMPLETED | OUTPATIENT
Start: 2025-02-02 | End: 2025-02-02

## 2025-02-02 RX ORDER — AMLODIPINE BESYLATE 5 MG/1
10 TABLET ORAL DAILY
Status: DISCONTINUED | OUTPATIENT
Start: 2025-02-03 | End: 2025-02-06 | Stop reason: HOSPADM

## 2025-02-02 RX ORDER — METHOCARBAMOL 500 MG/1
500 TABLET, FILM COATED ORAL 3 TIMES DAILY
Status: DISCONTINUED | OUTPATIENT
Start: 2025-02-02 | End: 2025-02-03

## 2025-02-02 RX ORDER — SUCRALFATE 1 G/1
1 TABLET ORAL
Status: DISCONTINUED | OUTPATIENT
Start: 2025-02-02 | End: 2025-02-06 | Stop reason: HOSPADM

## 2025-02-02 RX ORDER — HYDROXYUREA 500 MG/1
1000 CAPSULE ORAL DAILY
Status: DISCONTINUED | OUTPATIENT
Start: 2025-02-03 | End: 2025-02-06 | Stop reason: HOSPADM

## 2025-02-02 RX ORDER — DICYCLOMINE HYDROCHLORIDE 10 MG/1
20 CAPSULE ORAL
Status: COMPLETED | OUTPATIENT
Start: 2025-02-02 | End: 2025-02-02

## 2025-02-02 RX ORDER — HYDROMORPHONE HCL IN 0.9% NACL 6 MG/30 ML
PATIENT CONTROLLED ANALGESIA SYRINGE INTRAVENOUS CONTINUOUS
Status: DISCONTINUED | OUTPATIENT
Start: 2025-02-02 | End: 2025-02-02

## 2025-02-02 RX ORDER — GABAPENTIN 300 MG/1
600 CAPSULE ORAL 3 TIMES DAILY
Status: DISCONTINUED | OUTPATIENT
Start: 2025-02-02 | End: 2025-02-06 | Stop reason: HOSPADM

## 2025-02-02 RX ORDER — SODIUM CHLORIDE 450 MG/100ML
INJECTION, SOLUTION INTRAVENOUS CONTINUOUS
Status: ACTIVE | OUTPATIENT
Start: 2025-02-02 | End: 2025-02-03

## 2025-02-02 RX ADMIN — HYDROMORPHONE HYDROCHLORIDE 2 MG: 1 INJECTION, SOLUTION INTRAMUSCULAR; INTRAVENOUS; SUBCUTANEOUS at 05:02

## 2025-02-02 RX ADMIN — ONDANSETRON 4 MG: 2 INJECTION INTRAMUSCULAR; INTRAVENOUS at 02:02

## 2025-02-02 RX ADMIN — GABAPENTIN 600 MG: 300 CAPSULE ORAL at 10:02

## 2025-02-02 RX ADMIN — ACETAMINOPHEN 1000 MG: 500 TABLET ORAL at 10:02

## 2025-02-02 RX ADMIN — PROMETHAZINE HYDROCHLORIDE 12.5 MG: 25 INJECTION INTRAMUSCULAR; INTRAVENOUS at 06:02

## 2025-02-02 RX ADMIN — HYDROMORPHONE HYDROCHLORIDE 2 MG: 1 INJECTION, SOLUTION INTRAMUSCULAR; INTRAVENOUS; SUBCUTANEOUS at 03:02

## 2025-02-02 RX ADMIN — DIPHENHYDRAMINE HYDROCHLORIDE 25 MG: 25 CAPSULE ORAL at 02:02

## 2025-02-02 RX ADMIN — DICYCLOMINE HYDROCHLORIDE 20 MG: 10 CAPSULE ORAL at 03:02

## 2025-02-02 RX ADMIN — HYDROMORPHONE HYDROCHLORIDE 1 MG: 1 INJECTION, SOLUTION INTRAMUSCULAR; INTRAVENOUS; SUBCUTANEOUS at 02:02

## 2025-02-02 RX ADMIN — DIPHENHYDRAMINE HYDROCHLORIDE 25 MG: 25 CAPSULE ORAL at 05:02

## 2025-02-02 RX ADMIN — APIXABAN 5 MG: 5 TABLET, FILM COATED ORAL at 10:02

## 2025-02-02 RX ADMIN — SODIUM CHLORIDE: 4.5 INJECTION, SOLUTION INTRAVENOUS at 10:02

## 2025-02-02 RX ADMIN — SUCRALFATE 1 G: 1 TABLET ORAL at 10:02

## 2025-02-02 RX ADMIN — METHOCARBAMOL 500 MG: 500 TABLET ORAL at 10:02

## 2025-02-02 RX ADMIN — HYDROMORPHONE HYDROCHLORIDE 0.5 MG: 1 INJECTION, SOLUTION INTRAMUSCULAR; INTRAVENOUS; SUBCUTANEOUS at 10:02

## 2025-02-02 RX ADMIN — Medication: at 10:02

## 2025-02-02 NOTE — ED TRIAGE NOTES
Nazanin Malone, a 44 y.o. female presents to the ED w/ complaint of Sickle Cell Pain Crisis since Friday. States she's been without meds since Saturday. Pain to abdomen and bilateral legs and chronic pain to Left arm. Also reports diarrhea. Pain 10/10 at this time. Pt Aox4. Ambulatory     Triage note:  Chief Complaint   Patient presents with    Abdominal Pain    Chest Pain    Sickle Cell Pain Crisis     Pt reports sickle cell pain since the 1/29. Pt states pain to stomach and chest.     Review of patient's allergies indicates:   Allergen Reactions    Cat dander Anaphylaxis, Itching and Shortness Of Breath    Nsaids (non-steroidal anti-inflammatory drug) Itching and Anaphylaxis    Latex Rash           APPEARANCE: awake and alert. Reports PAIN  10/10  SKIN: warm, dry. Healed wound noted to Right lower leg, no drainage, site is clean and dry. Bruising noted to left arm.  MUSCULOSKELETAL: Patient moving all extremities spontaneously, no obvious swelling or deformities noted. Ambulates independently.  RESPIRATORY: Reports some shortness of breath.Respirations even and unlabored.   CARDIAC: Denies CP, 2+ distal pulses; no peripheral edema  ABDOMEN: Reports abdominal pain, nausea and vomiting. 4 emesis episodes in last 24hrs and diarrhea  : voids spontaneously, denies difficulty  Neurologic: AAO x 4; follows commands equal strength in all extremities; denies numbness/tingling. Denies dizziness

## 2025-02-02 NOTE — ED PROVIDER NOTES
"Encounter Date: 2025       History     Chief Complaint   Patient presents with    Abdominal Pain    Chest Pain    Sickle Cell Pain Crisis     Pt reports sickle cell pain since the . Pt states pain to stomach and chest.     Pt is a 45 yo female with PMH of sickle cell-beta thalassemia disease that presents to the ED with sickle cell pain. States pain started 3 days ago in her abdomen and B/L lower extremities.  She states that the pain has been fairly constant and severe.  She states that she has also been nauseous and vomiting and has had a few episodes of nonbloody diarrhea.  She denies any fevers.  She states that this pain is "definitely her sickle cell pain."She reports that she has run out of her Norco yesterday.        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    Asthma     Avascular necrosis of femur     Depression     History of pulmonary embolism     Hypertension     Morbid obesity     Opioid dependence 2017    Pneumonia due to Streptococcus pneumoniae 2017    Right lower lobe pneumonia 2017    Sepsis due to Streptococcus pneumoniae 2017    Sickle cell-beta thalassemia disease with pain     Trigeminal neuralgia      Past Surgical History:   Procedure Laterality Date     SECTION      ESOPHAGOGASTRODUODENOSCOPY N/A 2024    Procedure: EGD (ESOPHAGOGASTRODUODENOSCOPY);  Surgeon: Allen Marshall MD;  Location: [address]);  Service: Gastroenterology;  Laterality: N/A;    TONSILLECTOMY      TUBAL LIGATION       Family History   Problem Relation Name Age of Onset    Heart disease Mother      Diabetes Mother      Heart disease Father      Breast " cancer Neg Hx      Ovarian cancer Neg Hx      Colon cancer Neg Hx       Social History     Tobacco Use    Smoking status: Never    Smokeless tobacco: Never   Substance Use Topics    Alcohol use: No    Drug use: Yes     Types: Marijuana     Comment: periodically      Review of Systems    Physical Exam     Initial Vitals [02/02/25 1151]   BP Pulse Resp Temp SpO2   (!) 145/107 92 (!) 22 99 °F (37.2 °C) 98 %      MAP       --         Physical Exam    Constitutional: She appears well-developed and well-nourished. She appears distressed (Due to mild due to pain).   HENT:   Head: Normocephalic and atraumatic.   Eyes: EOM are normal. Pupils are equal, round, and reactive to light.   Neck: Neck supple.   Normal range of motion.  Cardiovascular:  Normal rate, regular rhythm and intact distal pulses.           Pulmonary/Chest: Breath sounds normal. No respiratory distress. She has no wheezes. She has no rhonchi. She has no rales. She exhibits no tenderness.   Abdominal: Abdomen is soft. She exhibits no distension and no mass. There is abdominal tenderness (Mild to palpation diffusely). There is no rebound and no guarding.   Musculoskeletal:         General: No tenderness or edema. Normal range of motion.      Cervical back: Normal range of motion and neck supple.     Neurological: She is oriented to person, place, and time. She has normal strength. No cranial nerve deficit.   Skin: Skin is warm and dry. Capillary refill takes less than 2 seconds.         ED Course   Procedures  Labs Reviewed   COMPREHENSIVE METABOLIC PANEL - Abnormal       Result Value    Sodium 141      Potassium 3.7      Chloride 108      CO2 19 (*)     Glucose 97      BUN 8      Creatinine 0.9      Calcium 10.5      Total Protein 9.6 (*)     Albumin 4.5      Total Bilirubin 1.1 (*)     Alkaline Phosphatase 98      AST 23      ALT 12      eGFR >60.0      Anion Gap 14     URINALYSIS, REFLEX TO URINE CULTURE   CBC W/ AUTO DIFFERENTIAL   RETICULOCYTES           Imaging Results              X-Ray Chest AP Portable (Final result)  Result time 02/02/25 14:49:54      Final result by Ralph Su MD (02/02/25 14:49:54)                   Impression:      See above      Electronically signed by: Ralph Su  Date:    02/02/2025  Time:    14:49               Narrative:    EXAMINATION:  XR CHEST AP PORTABLE    CLINICAL HISTORY:  shortness of breath, sickle cell acute chest eval;    TECHNIQUE:  Single frontal view of the chest was performed.    COMPARISON:  01/29/2025    FINDINGS:  No confluent consolidation or pulmonary edema.  Cardiac silhouette normal in size.  No blunting of costophrenic angles.  No acute osseous abnormality.                                       Medications   HYDROmorphone injection 1 mg (1 mg Intravenous Given 2/2/25 1419)   ondansetron injection 4 mg (4 mg Intravenous Given 2/2/25 1418)   diphenhydrAMINE capsule 25 mg (25 mg Oral Given 2/2/25 1419)   dicyclomine capsule 20 mg (20 mg Oral Given 2/2/25 1516)   HYDROmorphone injection 2 mg (2 mg Intravenous Given 2/2/25 1517)     Medical Decision Making  Nazanin Malone is a 44 y.o. female presenting to the ED with sickle cell pain crisis. History and physical exam as above. Initial vital signs stable and non-actionable. Initial work-up to include: Labs (CBC, CMP, reticulocytes, UA), Imaging (CXR,)    Differential diagnosis considered for this patient includes, but is not limited to: sickle cell pain crisis.  Other severe, more emergent diagnoses considered, but deemed much less likely, to include: sepsis, gastroenteritis 2/2 salmonella, acute chest syndrome.    Plan is for 3 rounds of pain medication, if no improvement->admit for SCC. She will be signed out to oncoming physician team pending 1 more round of pain meds.    Amount and/or Complexity of Data Reviewed  Labs: ordered.  Radiology: ordered. Decision-making details documented in ED Course.    Risk  OTC drugs.  Prescription drug management.                ED Course as of 02/02/25 1530   Sun Feb 02, 2025   1428 X-Ray Chest AP Portable  As per my interpretation there is no pneumothorax, focal consolidations, and cardiac silhouette is within normal limits.  No costophrenic angle blunting  Not concerning for acute chest  [HJ]   1433 Pain improved but still 9/10 in abdomen and legs [HJ]      ED Course User Index  [HJ] Onur Palomares MD                           Clinical Impression:  Final diagnoses:  [D57.00] Sickle cell pain crisis (Primary)                 Onur Palomares MD  Resident  02/02/25 1530

## 2025-02-03 LAB
ALBUMIN SERPL BCP-MCNC: 3.6 G/DL (ref 3.5–5.2)
ALP SERPL-CCNC: 74 U/L (ref 40–150)
ALT SERPL W/O P-5'-P-CCNC: 9 U/L (ref 10–44)
ANION GAP SERPL CALC-SCNC: 11 MMOL/L (ref 8–16)
AST SERPL-CCNC: 23 U/L (ref 10–40)
BASOPHILS # BLD AUTO: 0.05 K/UL (ref 0–0.2)
BASOPHILS NFR BLD: 0.8 % (ref 0–1.9)
BILIRUB SERPL-MCNC: 0.8 MG/DL (ref 0.1–1)
BILIRUB UR QL STRIP: NEGATIVE
BUN SERPL-MCNC: 9 MG/DL (ref 6–20)
CALCIUM SERPL-MCNC: 9.1 MG/DL (ref 8.7–10.5)
CHLORIDE SERPL-SCNC: 105 MMOL/L (ref 95–110)
CLARITY UR REFRACT.AUTO: CLEAR
CO2 SERPL-SCNC: 20 MMOL/L (ref 23–29)
COLOR UR AUTO: YELLOW
CREAT SERPL-MCNC: 1.2 MG/DL (ref 0.5–1.4)
DIFFERENTIAL METHOD BLD: ABNORMAL
EOSINOPHIL # BLD AUTO: 0.2 K/UL (ref 0–0.5)
EOSINOPHIL NFR BLD: 3 % (ref 0–8)
ERYTHROCYTE [DISTWIDTH] IN BLOOD BY AUTOMATED COUNT: 23.1 % (ref 11.5–14.5)
EST. GFR  (NO RACE VARIABLE): 56.5 ML/MIN/1.73 M^2
GLUCOSE SERPL-MCNC: 86 MG/DL (ref 70–110)
GLUCOSE UR QL STRIP: NEGATIVE
HCT VFR BLD AUTO: 23.7 % (ref 37–48.5)
HGB BLD-MCNC: 7.6 G/DL (ref 12–16)
HGB UR QL STRIP: NEGATIVE
IMM GRANULOCYTES # BLD AUTO: 0.01 K/UL (ref 0–0.04)
IMM GRANULOCYTES NFR BLD AUTO: 0.2 % (ref 0–0.5)
KETONES UR QL STRIP: NEGATIVE
LEUKOCYTE ESTERASE UR QL STRIP: NEGATIVE
LYMPHOCYTES # BLD AUTO: 3.8 K/UL (ref 1–4.8)
LYMPHOCYTES NFR BLD: 59.3 % (ref 18–48)
MAGNESIUM SERPL-MCNC: 1.9 MG/DL (ref 1.6–2.6)
MCH RBC QN AUTO: 21.7 PG (ref 27–31)
MCHC RBC AUTO-ENTMCNC: 32.1 G/DL (ref 32–36)
MCV RBC AUTO: 68 FL (ref 82–98)
MONOCYTES # BLD AUTO: 0.9 K/UL (ref 0.3–1)
MONOCYTES NFR BLD: 13.9 % (ref 4–15)
NEUTROPHILS # BLD AUTO: 1.4 K/UL (ref 1.8–7.7)
NEUTROPHILS NFR BLD: 22.8 % (ref 38–73)
NITRITE UR QL STRIP: NEGATIVE
NRBC BLD-RTO: 2 /100 WBC
OHS QRS DURATION: 100 MS
OHS QTC CALCULATION: 473 MS
PH UR STRIP: 6 [PH] (ref 5–8)
PHOSPHATE SERPL-MCNC: 3.7 MG/DL (ref 2.7–4.5)
PLATELET # BLD AUTO: 283 K/UL (ref 150–450)
PMV BLD AUTO: 9.2 FL (ref 9.2–12.9)
POTASSIUM SERPL-SCNC: 3.8 MMOL/L (ref 3.5–5.1)
PROT SERPL-MCNC: 7.6 G/DL (ref 6–8.4)
PROT UR QL STRIP: ABNORMAL
RBC # BLD AUTO: 3.5 M/UL (ref 4–5.4)
SODIUM SERPL-SCNC: 136 MMOL/L (ref 136–145)
SP GR UR STRIP: 1.01 (ref 1–1.03)
URN SPEC COLLECT METH UR: ABNORMAL
WBC # BLD AUTO: 6.32 K/UL (ref 3.9–12.7)

## 2025-02-03 PROCEDURE — 80053 COMPREHEN METABOLIC PANEL: CPT

## 2025-02-03 PROCEDURE — 94761 N-INVAS EAR/PLS OXIMETRY MLT: CPT

## 2025-02-03 PROCEDURE — 36415 COLL VENOUS BLD VENIPUNCTURE: CPT

## 2025-02-03 PROCEDURE — 83735 ASSAY OF MAGNESIUM: CPT

## 2025-02-03 PROCEDURE — 85025 COMPLETE CBC W/AUTO DIFF WBC: CPT

## 2025-02-03 PROCEDURE — 25000003 PHARM REV CODE 250

## 2025-02-03 PROCEDURE — 99900035 HC TECH TIME PER 15 MIN (STAT)

## 2025-02-03 PROCEDURE — 27000221 HC OXYGEN, UP TO 24 HOURS

## 2025-02-03 PROCEDURE — 11000001 HC ACUTE MED/SURG PRIVATE ROOM

## 2025-02-03 PROCEDURE — 84100 ASSAY OF PHOSPHORUS: CPT

## 2025-02-03 PROCEDURE — 25000003 PHARM REV CODE 250: Performed by: HOSPITALIST

## 2025-02-03 PROCEDURE — 63600175 PHARM REV CODE 636 W HCPCS: Performed by: HOSPITALIST

## 2025-02-03 RX ORDER — SODIUM CHLORIDE 450 MG/100ML
INJECTION, SOLUTION INTRAVENOUS CONTINUOUS
Status: ACTIVE | OUTPATIENT
Start: 2025-02-03 | End: 2025-02-03

## 2025-02-03 RX ORDER — HYDROXYZINE HYDROCHLORIDE 25 MG/1
50 TABLET, FILM COATED ORAL 3 TIMES DAILY PRN
Status: DISCONTINUED | OUTPATIENT
Start: 2025-02-03 | End: 2025-02-06 | Stop reason: HOSPADM

## 2025-02-03 RX ORDER — TIZANIDINE 4 MG/1
4 TABLET ORAL EVERY 8 HOURS
Status: DISCONTINUED | OUTPATIENT
Start: 2025-02-03 | End: 2025-02-06 | Stop reason: HOSPADM

## 2025-02-03 RX ADMIN — SUCRALFATE 1 G: 1 TABLET ORAL at 12:02

## 2025-02-03 RX ADMIN — AMLODIPINE BESYLATE 10 MG: 5 TABLET ORAL at 08:02

## 2025-02-03 RX ADMIN — HYDROXYZINE HYDROCHLORIDE 50 MG: 25 TABLET ORAL at 11:02

## 2025-02-03 RX ADMIN — ACETAMINOPHEN 1000 MG: 500 TABLET ORAL at 03:02

## 2025-02-03 RX ADMIN — PANTOPRAZOLE SODIUM 40 MG: 40 TABLET, DELAYED RELEASE ORAL at 08:02

## 2025-02-03 RX ADMIN — DIPHENHYDRAMINE HYDROCHLORIDE 25 MG: 25 CAPSULE ORAL at 01:02

## 2025-02-03 RX ADMIN — GABAPENTIN 600 MG: 300 CAPSULE ORAL at 03:02

## 2025-02-03 RX ADMIN — GABAPENTIN 600 MG: 300 CAPSULE ORAL at 09:02

## 2025-02-03 RX ADMIN — Medication: at 12:02

## 2025-02-03 RX ADMIN — SUCRALFATE 1 G: 1 TABLET ORAL at 09:02

## 2025-02-03 RX ADMIN — SUCRALFATE 1 G: 1 TABLET ORAL at 05:02

## 2025-02-03 RX ADMIN — ACETAMINOPHEN 1000 MG: 500 TABLET ORAL at 09:02

## 2025-02-03 RX ADMIN — SODIUM CHLORIDE: 450 INJECTION, SOLUTION INTRAVENOUS at 11:02

## 2025-02-03 RX ADMIN — TIZANIDINE 4 MG: 4 TABLET ORAL at 09:02

## 2025-02-03 RX ADMIN — SUCRALFATE 1 G: 1 TABLET ORAL at 03:02

## 2025-02-03 RX ADMIN — DIPHENHYDRAMINE HYDROCHLORIDE 25 MG: 50 INJECTION INTRAMUSCULAR; INTRAVENOUS at 12:02

## 2025-02-03 RX ADMIN — METHOCARBAMOL 500 MG: 500 TABLET ORAL at 08:02

## 2025-02-03 RX ADMIN — FOLIC ACID 1 MG: 1 TABLET ORAL at 08:02

## 2025-02-03 RX ADMIN — GABAPENTIN 600 MG: 300 CAPSULE ORAL at 08:02

## 2025-02-03 RX ADMIN — HYDROXYUREA 1000 MG: 500 CAPSULE ORAL at 12:02

## 2025-02-03 RX ADMIN — TIZANIDINE 4 MG: 4 TABLET ORAL at 12:02

## 2025-02-03 RX ADMIN — APIXABAN 5 MG: 5 TABLET, FILM COATED ORAL at 09:02

## 2025-02-03 RX ADMIN — ACETAMINOPHEN 1000 MG: 500 TABLET ORAL at 08:02

## 2025-02-03 RX ADMIN — APIXABAN 5 MG: 5 TABLET, FILM COATED ORAL at 08:02

## 2025-02-03 RX ADMIN — FLUOXETINE HYDROCHLORIDE 40 MG: 20 CAPSULE ORAL at 08:02

## 2025-02-03 NOTE — PHARMACY MED REC
"        Admission Medication History     The home medication history was taken by Argenis Turner.    You may go to "Admission" then "Reconcile Home Medications" tabs to review and/or act upon these items.     The home medication list has been updated by the Pharmacy department.   Please read ALL comments highlighted in yellow.   Please address this information as you see fit.    Feel free to contact us if you have any questions or require assistance.      The medications listed below were removed from the home medication list. Please reorder if appropriate:  Patient reports no longer taking the following medication(s):  Norco     Medications listed below were obtained from: Patient/family and Analytic software- [employer] Medications   Medication Sig    acetaminophen (TYLENOL) 500 MG tablet Take 2 tablets (1,000 Mg Total) By Mouth Every 8 (Eight) Hours As Needed For Pain.      albuterol (VENTOLIN HFA) 90 mcg/actuation inhaler Inhale 2 Puffs Into The Lungs Every 6 (Six) Hours As Needed For Wheezing Or Shortness Of Breath. Rescue      amLODIPine (NORVASC) 10 MG tablet Take 1 Tablet (10 Mg Total) By Mouth Once Daily.      apixaban (ELIQUIS) 5 mg Tab Take 1 Tablet (5 Mg Total) By Mouth 2 (Two) Times Daily.      ascorbic acid (VITAMIN C ORAL) Take 1 Capsule By Mouth Once Daily.      FLUoxetine 40 MG capsule Take 1 Capsule (40 Mg Total) By Mouth Once Daily.      fluticasone propionate (FLONASE) 50 mcg/actuation nasal spray Instill 1 Spray Into Each Nostril Once Daily.      folic acid (FOLVITE) 1 MG tablet Take 1 Tablet (1 Mg Total) By Mouth Once daily.      gabapentin (NEURONTIN) 300 MG capsule Take 2 Capsules (600 Mg Total) By Mouth 3 (Three) Times Daily.      hydroxyurea (HYDREA) 500 mg Cap Take 2 Capsules (1,000 Mg Total) By Mouth Once Daily.      hydrOXYzine pamoate (VISTARIL) 25 MG Cap Take 1 Capsule (25 Mg Total) By Mouth Every 4 (Four) Hours As Needed Anxiety      methocarbamoL (ROBAXIN) 750 MG Tab Take 1 Tablet " (750 Mg Total) By Mouth 3 (Three) Times Daily As Needed Muscle Pain      metoclopramide HCl (REGLAN) 5 MG tablet Take 1 Tablet (5 Mg Total) By Mouth 3 (Three) Times Daily Before Meals.      morphine (MS CONTIN) 30 MG 12 hr tablet Take 1 Tablet (30 Mg Total) By Mouth Every 12 (Twelve) Hours      pantoprazole (PROTONIX) 40 MG tablet Take 1 Tablet (40 Mg Total) By Mouth 2 (Two) Times Daily.      senna-docusate 8.6-50 mg (PERICOLACE) 8.6-50 mg per tablet Take 1 Tablet By Mouth Daily As Needed For Constipation.      sucralfate (CARAFATE) 1 gram tablet Take 1 Tablet (1 G Total) By Mouth 4 (Four) Times Daily Before Meals And Nightly.         Argenis Turner  [id number]          .

## 2025-02-03 NOTE — ED NOTES
"Benadryl given for "itching". Upon nurse entry to room patient asking if anything abnormal has been seen on her cardiac monitoring. When questioned pt stating "she's been having some chest pains" but is unable to describe. When asked if it was her sickle cell pain pt stating " I dont know". This nurse explained to patient yes cardiac monitors have been being reviewed and no abnormalities have been seen but we would obtain an EKG to be sure. PT stating she did not feel a EKG was necessary. This nurse explained to patient EKG is protocol no matter what is believed to be causing the "chest pain". Admitting resident notified of complaint and nursing to obtain EKG so interpretation could be done.  " [employer]

## 2025-02-03 NOTE — ASSESSMENT & PLAN NOTE
Patient with history of gastritis that is chronic and stable. Associated with chronic diarrhea. Notes recent episodes of vomiting and diarrhea prior to admission. Will give fluids given c/f dehydration and continue home meds    - C.diff unlikely but will check  - Continue home PPI and carafate

## 2025-02-03 NOTE — ASSESSMENT & PLAN NOTE
44 y.o F with pmhx sickle cell beta thalassemia zero, multiple pulmonary emboli on chronic AC, asthma, and HTN who presented to [facility] ED for diffuse myalgias and pain consistent with a sickle cell pain crisis as well as reported fever (100.8), nausea, vomiting, and diarrhea for the past day. She was recently admitted to [facility] 1/7-1/25 for a similar pain crisis. CXR without focal consolidation, on RA and denying SOB, cough, chest pain. No concern for acute chest on admission.    - Multimodal pain regimen scheduled tylenol, tizanidine  - Dilaudid PCA   - Folic acid, hydroxyurea

## 2025-02-03 NOTE — MEDICAL/APP STUDENT
[facility] Medicine  Progress Note    Patient Name: Nazanin Malone  MRN: [id number]  Patient Class: IP- Inpatient   Admission Date: 2/2/2025  Length of Stay: 0 days  Attending Physician: Paola Mckeon*  Primary Care Provider: Kezia, Primary Doctor        Subjective:     Chief Complaint   Patient presents with    Abdominal Pain    Chest Pain    Sickle Cell Pain Crisis     Pt reports sickle cell pain since the 1/29. Pt states pain to stomach and chest.       Principal Problem:Vaso-occlusive pain due to sickle cell disease        HPI:  Nazanin Malone is a 44-year-old female with sickle cell beta thalassemia zero, multiple pulmonary emboli on chronic AC, asthma, and HTN who presented to [facility] ED 2/2/25 for diffuse myalgias and pain consistent with a sickle cell pain crisis as well as low grade fever, nausea, vomiting, and diarrhea for the past 2 days. She states that she has been having issues with gastritis recently, with ongoing nausea, vomiting, and watery diarrhea that has been exacerbating her pain. She went to the ED on 1/29/25 with mild relief of pain and was discharged. Her pain continued to persist and she noted a subjective fever of 100.8 so she decided to present again to the ED. She was recently admitted to [facility] 1/7/25 - 1/25/25 for pain c/w sickle cell pain crises which was refractory to her home regimen of hydrea, folic acid oxy. Patient recently moved here from [place]. She was receiving chronic exchange transfusions there but is not currently receiving them as part of her therapy here. She is seeing a hematologist. Of note, patient requested administration of further dilaudid than ordered from nurses. Also asked if nursing needed help to throw away empty syringes and empty med syringe. Patient at recent admission was started on a PCA pump given difficulty weaning dilaudid and also found to have L cephalic vein and partial occlusive thrombus of LIJ.      In the ED, /108 HR  78 RR 20 sats adequate on room air. Tmax 100.9. Labs notable for Hgb 8.2. reticulocyte 1.4. UA was ordered. CXR negative for acute infectious process. Patient received 5 mg dilaudid without resolution of pain, benadryl, and phenergan. Patient admitted to hospital medicine for further workup and management.      Overview/Hospital Course:  44 y.o F w/ SCD presenting in acute crisis. Suspect viral gastroenteritis and GI losses precipitated current event. Labs and vitals are reassuring. Infectious stool studies pending. Started on PCA pump with plans to wean and transition to orals when appropriate      Interval History: Patient had no new diarrhea episodes overnight.    Review of Systems   Respiratory:  Negative for wheezing and stridor.    Gastrointestinal:  Negative for abdominal pain.     Objective:   Weight: 93.9 kg (207 lb)  Body mass index is 37.86 kg/m².    Intake/Output Summary (Last 24 hours) at 2/3/2025 1359  Last data filed at 2/3/2025 0359  Gross per 24 hour   Intake 50 ml   Output 1 ml   Net 49 ml     Vitals:    02/03/25 1253   BP:    Pulse:    Resp: 16   Temp:      Physical Exam  Constitutional:       Appearance: She is obese.   HENT:      Head: Normocephalic and atraumatic.   Eyes:      Pupils: Pupils are equal, round, and reactive to light.   Cardiovascular:      Rate and Rhythm: Normal rate.   Musculoskeletal:      Right lower leg: Edema present.      Left lower leg: Edema present.   Neurological:      Mental Status: She is oriented to person, place, and time.         Significant Labs: All pertinent labs within the past 24 hours have been reviewed.  Recent Lab Results  (Last 5 results in the past 24 hours)        02/03/25  0257   02/03/25  0130   02/02/25  2325   02/02/25  2103   02/02/25  1756        Albumin 3.6               ALP 74               ALT 9               Anion Gap 11               Appearance, UA     Clear           AST 23  Comment: *Result may be interfered by visible hemolysis                Baso # 0.05         0.05       Basophil % 0.8         0.8       Bilirubin (UA)     Negative           BILIRUBIN TOTAL 0.8  Comment: For infants and newborns, interpretation of results should be based  on gestational age, weight and in agreement with clinical  observations.    Premature Infant recommended reference ranges:  Up to 24 hours.............<8.0 mg/dL  Up to 48 hours............<12.0 mg/dL  3-5 days..................<15.0 mg/dL  6-29 days.................<15.0 mg/dL                 BUN 9               Calcium 9.1               Chloride 105               CO2 20               Color, UA     Yellow           Creatinine 1.2               Differential Method Automated         Automated       eGFR 56.5               Eos # 0.2         0.1       Eos % 3.0         1.0       Glucose 86               Glucose, UA     Negative           Gran # (ANC) 1.4         2.6       Gran % 22.8         42.1       Haptoglobin       43         Hematocrit 23.7         24.8       Hemoglobin 7.6         8.2       Immature Grans (Abs) 0.01  Comment: Mild elevation in immature granulocytes is non specific and   can be seen in a variety of conditions including stress response,   acute inflammation, trauma and pregnancy. Correlation with other   laboratory and clinical findings is essential.           0.01  Comment: Mild elevation in immature granulocytes is non specific and   can be seen in a variety of conditions including stress response,   acute inflammation, trauma and pregnancy. Correlation with other   laboratory and clinical findings is essential.         Immature Granulocytes 0.2         0.2       Ketones, UA     Negative           Lactate Dehydrogenase       230  Comment: Results are increased in hemolyzed samples.         Leukocyte Esterase, UA     Negative           Lymph # 3.8         2.9       Lymph % 59.3         46.4       Magnesium  1.9               MCH 21.7         21.7       MCHC 32.1         33.1       MCV 68          66       Mono # 0.9         0.6       Mono % 13.9         9.5       MPV 9.2         8.9       NITRITE UA     Negative           nRBC 2         2       Blood, UA     Negative           QRS Duration   100             OHS QTC Calculation   473             pH, UA     6.0           Phosphorus Level 3.7               Platelet Count 283         357       Potassium 3.8               PROTEIN TOTAL 7.6               Protein, UA     Trace  Comment: Recommend a 24 hour urine protein or a urine   protein/creatinine ratio if globulin induced proteinuria is  clinically suspected.             RBC 3.50         3.78       RDW 23.1         23.1       Retic         1.4       Sodium 136               Spec Grav UA     1.015           Specimen UA     Urine, Clean Catch           WBC 6.32         6.23                              Significant Imaging: I have reviewed all pertinent imaging results/findings within the past 24 hours.    X-Ray Chest AP Portable   Final Result      See above         Electronically signed by: Ralph Su   Date:    02/02/2025   Time:    14:49                Assessment/Plan:      * Vaso-occlusive pain due to sickle cell disease  She was recently admitted to [facility] 1/7-1/25 for a pain crisis.  CXR without focal consolidation.   Patient on RA, denies SOB/cough  No concern for acute chest on admission    - Multimodal pain regimen scheduled tylenol, robaxin  - Dilaudid PCA   - Folic acid, hydroxyurea    Chronic prescription opiate use  - See Vaso-occlusive pain    Hx pulmonary embolism  Multiple emboli noted in past.    -Continue home anticoagulation    Chronic gastritis  Chronic and stable.  Associated with chronic diarrhea  Reports ongoing issue    - C.diff unlikely but will check  - Continue home PPI and carafate    Obesity (BMI 30-39.9)  Body mass index is 38.04 kg/m². Morbid obesity complicates all aspects of disease management from diagnostic modalities to treatment. Weight loss encouraged and health benefits  explained to patient.         Hypertension  Patient's blood pressure range in the last 24 hours was: BP  Min: 145/107  Max: 184/108.The patient's inpatient anti-hypertensive regimen is listed below:  Current Antihypertensives  amLODIPine tablet 10 mg, Daily, Oral        Plan  - BP is controlled, no changes needed to their regimen      VTE Risk Mitigation (From admission, onward)           Ordered     apixaban tablet 5 mg  2 times daily         02/02/25 1920                    Discharge Planning   ALYSE: 2/6/2025     Code Status: Prior   Is the patient medically ready for discharge?:     Reason for patient still in hospital (select all that apply): {HMREASONPATIENTINHOSP:08789}  Discharge Plan A: Home with family                  Lenin Gastelum  Department of Hospital Medicine   [facility] - Surgery

## 2025-02-03 NOTE — HOSPITAL COURSE
44 y.o F w/ SCD presenting in acute crisis. Suspect viral gastroenteritis and GI losses precipitated current event. Labs and vitals reassuring. Infectious stool studies pending however diarrhea stopped after admission to the hospital. Patient started on PCA pump (0.4 mg q15min) and IVF which was maintained for appx 2 days. Patient subsequently transitioned to oral pain regimen of MS Contin 30 mg BID with Norco  q4 PRN, tizanidine 4 mg q8h. Received 1u pRBC while inpatient on 2/4 with further improvement of symptoms. On 2/6 patient adequately controlled on oral regimen and discharged with follow up with hematology on 2/11.

## 2025-02-03 NOTE — RESPIRATORY THERAPY
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    24 Hours Vitals Range:  Temp:  [97 °F (36.1 °C)-98.9 °F (37.2 °C)]   Pulse:  [66-80]   Resp:  [15-20]   BP: (100-174)/()   SpO2:  [92 %-100 %]     Labs:    Recent Labs     25  1419 25  0257    136   K 3.7 3.8    105   CO2 19* 20*   BUN 8 9   CREATININE 0.9 1.2   GLU 97 86   PHOS  --  3.7   MG  --  1.9        No results for input(s): "PH", "PCO2", "PO2", "HCO3", "POCSATURATED", "BE" in the last 72 hours.    ASSESSMENT/INTERVENTIONS         Last VS   Temp: 98.4 °F (36.9 °C) (1547)  Pulse: 79 (1547)  Resp: 16 (1547)  BP: 100/54 (1547)  SpO2: 99 % (1547)    Level of Consciousness: Level of Consciousness (AVPU): alert  Respiratory Effort:   Expansion/Accessory Muscle Usage:    All Lung Field Breath Sounds:    Is the ETCO2 monitor on? Yes  Is the patient wearing a cannula? Yes  Are ETCO2 orders placed? Yes  Is the patient on a PCA pump? Yes  ETCO2 monitored: ETCO2 (mmHg): 51 mmHg  Ambu at bedside: y    Active Orders   Respiratory Care    END TIDAL CO2 MONITOR Q12H     Frequency: Q12H     Number of Occurrences: Until Specified    Incentive spirometry     Frequency: Q6H     Number of Occurrences: Until " Specified    Oxygen Continuous     Frequency: Continuous     Number of Occurrences: Until Specified     Order Questions:      To maintain SpO2 goal of: >= 92%    Pulse Oximetry Continuous     Frequency: Continuous     Number of Occurrences: Until Specified       RECOMMENDATIONS    We recommend: RRT Recs: Continue POC per primary team.    FOLLOW-UP    Please call back the Rapid Response RT, Arabella Yeung, RRT at x [phone] for any questions or concerns.

## 2025-02-03 NOTE — PROGRESS NOTES
[facility] Medicine  Progress Note    Patient Name: Nazanin Malone  MRN: [id number]  Patient Class: IP- Inpatient   Admission Date: 2/2/2025  Length of Stay: 0 days  Attending Physician: Paola Mckeon*  Primary Care Provider: No, Primary Doctor        Subjective     Principal Problem:Vaso-occlusive pain due to sickle cell disease        HPI:  Nazanin Malone is a 44-year-old female with sickle cell beta thalassemia zero, multiple pulmonary emboli on chronic AC, asthma, and HTN who presented to [facility] ED 2/2/25 for diffuse myalgias and pain consistent with a sickle cell pain crisis as well as low grade fever, nausea, vomiting, and diarrhea for the past 2 days. She states that she has been having issues with gastritis recently, with ongoing nausea, vomiting, and watery diarrhea that has been exacerbating her pain. She went to the ED on 1/29/25 with mild relief of pain and was discharged. Her pain continued to persist and she noted a subjective fever of 100.8 so she decided to present again to the ED. She was recently admitted to [facility] 1/7/25 - 1/25/25 for pain c/w sickle cell pain crises which was refractory to her home regimen of hydrea, folic acid oxy. Patient recently moved here from [place]. She was receiving chronic exchange transfusions there but is not currently receiving them as part of her therapy here. She is seeing a hematologist. Of note, patient requested administration of further dilaudid than ordered from nurses. Also asked if nursing needed help to throw away empty syringes and empty med syringe. Patient at recent admission was started on a PCA pump given difficulty weaning dilaudid and also found to have L cephalic vein and partial occlusive thrombus of LIJ.      In the ED, /108 HR 78 RR 20 sats adequate on room air. Tmax 100.9. Labs notable for Hgb 8.2. reticulocyte 1.4. UA was ordered. CXR negative for acute infectious process. Patient received 5 mg dilaudid without  resolution of pain, benadryl, and phenergan. Patient admitted to hospital medicine for further workup and management.    Overview/Hospital Course:  44 y.o F w/ SCD presenting in acute crisis. Suspect viral gastroenteritis and GI losses precipitated current event. Labs and vitals are reassuring. Infectious stool studies pending. Started on PCA pump with plans to wean and transition to orals when appropriate    Interval History: NAEON and VSS. Patient feeling better after receiving fluids but notes continued abdominal, bilateral leg pain. Had two watery bowel movements overnight and still notes nausea. Pruritus unresolved by p.o benadryl. Denies fevers, chills, chest pain, shortness of breath.     Review of Systems   Constitutional:  Positive for activity change. Negative for chills and fever.   HENT:  Negative for rhinorrhea.    Respiratory:  Negative for cough, shortness of breath and wheezing.    Cardiovascular:  Negative for chest pain, palpitations and leg swelling.   Gastrointestinal:  Positive for abdominal pain, diarrhea and nausea. Negative for abdominal distention, constipation and vomiting.   Genitourinary:  Negative for dysuria.   Musculoskeletal:  Positive for arthralgias.   Neurological:  Negative for weakness.   Psychiatric/Behavioral:  Negative for agitation and confusion.    All other systems reviewed and are negative.    Objective:     Vital Signs (Most Recent):  Temp: 98.8 °F (37.1 °C) (02/03/25 1100)  Pulse: 74 (02/03/25 1100)  Resp: 16 (02/03/25 1253)  BP: 109/63 (02/03/25 1100)  SpO2: 98 % (02/03/25 1100) Vital Signs (24h Range):  Temp:  [97 °F (36.1 °C)-99.6 °F (37.6 °C)] 98.8 °F (37.1 °C)  Pulse:  [66-80] 74  Resp:  [15-20] 16  SpO2:  [92 %-100 %] 98 %  BP: (109-184)/() 109/63     Weight: 93.9 kg (207 lb)  Body mass index is 37.86 kg/m².    Intake/Output Summary (Last 24 hours) at 2/3/2025 7347  Last data filed at 2/3/2025 7111  Gross per 24 hour   Intake 50 ml   Output 1 ml   Net 49 ml          Physical Exam  Vitals and nursing note reviewed.   Constitutional:       General: She is not in acute distress.     Appearance: Normal appearance. She is not ill-appearing.   HENT:      Head: Normocephalic and atraumatic.      Nose: No congestion or rhinorrhea.      Mouth/Throat:      Pharynx: Oropharynx is clear. No oropharyngeal exudate.   Eyes:      General: No scleral icterus.     Extraocular Movements: Extraocular movements intact.      Conjunctiva/sclera: Conjunctivae normal.   Cardiovascular:      Rate and Rhythm: Normal rate and regular rhythm.      Pulses: Normal pulses.      Heart sounds: Normal heart sounds. No murmur heard.     No friction rub. No gallop.   Pulmonary:      Effort: Pulmonary effort is normal. No respiratory distress.      Breath sounds: Normal breath sounds. No stridor. No wheezing, rhonchi or rales.   Chest:      Chest wall: No tenderness.   Abdominal:      General: Abdomen is flat. There is no distension.      Palpations: Abdomen is soft.      Tenderness: There is abdominal tenderness (mild). There is no guarding or rebound.   Musculoskeletal:      Right lower leg: No edema.      Left lower leg: No edema.   Skin:     General: Skin is warm.      Coloration: Skin is not jaundiced.      Findings: No erythema.   Neurological:      Mental Status: She is alert and oriented to person, place, and time.   Psychiatric:         Mood and Affect: Mood normal.         Behavior: Behavior normal.             Significant Labs: All pertinent labs within the past 24 hours have been reviewed.    Significant Imaging: I have reviewed all pertinent imaging results/findings within the past 24 hours.    Assessment and Plan     * Vaso-occlusive pain due to sickle cell disease  44 y.o F with pmhx sickle cell beta thalassemia zero, multiple pulmonary emboli on chronic AC, asthma, and HTN who presented to [facility] ED for diffuse myalgias and pain consistent with a sickle cell pain crisis as well as reported  fever (100.8), nausea, vomiting, and diarrhea for the past day. She was recently admitted to [facility] 1/7-1/25 for a similar pain crisis. CXR without focal consolidation, on RA and denying SOB, cough, chest pain. No concern for acute chest on admission.    - Multimodal pain regimen scheduled tylenol, tizanidine  - Dilaudid PCA   - Folic acid, hydroxyurea      Chronic prescription opiate use  - See Vaso-occlusive pain    Hx pulmonary embolism  Multiple emboli noted in past.    -Continue home anticoagulation    Chronic gastritis  Patient with history of gastritis that is chronic and stable. Associated with chronic diarrhea. Notes recent episodes of vomiting and diarrhea prior to admission. Will give fluids given c/f dehydration and continue home meds    - C.diff unlikely but will check  - Continue home PPI and carafate    Obesity (BMI 30-39.9)  Body mass index is 37.86 kg/m². Morbid obesity complicates all aspects of disease management from diagnostic modalities to treatment. Weight loss encouraged and health benefits explained to patient.         Hypertension  Patient's blood pressure range in the last 24 hours was: BP  Min: 109/63  Max: 184/108.The patient's inpatient anti-hypertensive regimen is listed below:  Current Antihypertensives  amLODIPine tablet 10 mg, Daily, Oral    Plan  - BP is controlled, no changes needed to their regimen  - Adjust as needed      VTE Risk Mitigation (From admission, onward)           Ordered     apixaban tablet 5 mg  2 times daily         02/02/25 1920                    Discharge Planning   ALYSE: 2/6/2025     Code Status: DNR   Medical Readiness for Discharge Date:   Discharge Plan A: Home with family                        Pee Smith MD  Internal Medicine, PGY-1  Department of Hospital Medicine   [facility] - Surgery

## 2025-02-03 NOTE — ASSESSMENT & PLAN NOTE
Patient's blood pressure range in the last 24 hours was: BP  Min: 109/63  Max: 184/108.The patient's inpatient anti-hypertensive regimen is listed below:  Current Antihypertensives  amLODIPine tablet 10 mg, Daily, Oral    Plan  - BP is controlled, no changes needed to their regimen  - Adjust as needed

## 2025-02-03 NOTE — ASSESSMENT & PLAN NOTE
Chronic and stable.  Associated with chronic diarrhea  Reports ongoing issue    - C.diff unlikely but will check  - Continue home PPI and carafate

## 2025-02-03 NOTE — HPI
Nazanin Malone is a 44-year-old female with sickle cell beta thalassemia zero, multiple pulmonary emboli on chronic AC, asthma, and HTN who presented to [facility] ED 2/2/25 for diffuse myalgias and pain consistent with a sickle cell pain crisis as well as low grade fever, nausea, vomiting, and diarrhea for the past 2 days. She states that she has been having issues with gastritis recently, with ongoing nausea, vomiting, and watery diarrhea that has been exacerbating her pain. She went to the ED on 1/29/25 with mild relief of pain and was discharged. Her pain continued to persist and she noted a subjective fever of 100.8 so she decided to present again to the ED. She was recently admitted to [facility] 1/7/25 - 1/25/25 for pain c/w sickle cell pain crises which was refractory to her home regimen of hydrea, folic acid oxy. Patient recently moved here from [place]. She was receiving chronic exchange transfusions there but is not currently receiving them as part of her therapy here. She is seeing a hematologist. Of note, patient requested administration of further dilaudid than ordered from nurses. Also asked if nursing needed help to throw away empty syringes and empty med syringe. Patient at recent admission was started on a PCA pump given difficulty weaning dilaudid and also found to have L cephalic vein and partial occlusive thrombus of LIJ.      In the ED, /108 HR 78 RR 20 sats adequate on room air. Tmax 100.9. Labs notable for Hgb 8.2. reticulocyte 1.4. UA was ordered. CXR negative for acute infectious process. Patient received 5 mg dilaudid without resolution of pain, benadryl, and phenergan. Patient admitted to hospital medicine for further workup and management.

## 2025-02-03 NOTE — ASSESSMENT & PLAN NOTE
Patient's blood pressure range in the last 24 hours was: BP  Min: 145/107  Max: 184/108.The patient's inpatient anti-hypertensive regimen is listed below:  Current Antihypertensives  amLODIPine tablet 10 mg, Daily, Oral    Plan  - BP is controlled, no changes needed to their regimen

## 2025-02-03 NOTE — H&P
[facility] - Emergency Dept  Hospital Medicine  History & Physical    Patient Name: Nazanin Malone  MRN: [id number]  Patient Class: OP- Observation  Admission Date: 2/2/2025  Attending Physician: Paola Mckeon*   Primary Care Provider: Kezia, Primary Doctor         Patient information was obtained from patient and ER records.     Subjective:     Principal Problem:Vaso-occlusive pain due to sickle cell disease    Chief Complaint:   Chief Complaint   Patient presents with    Abdominal Pain    Chest Pain    Sickle Cell Pain Crisis     Pt reports sickle cell pain since the 1/29. Pt states pain to stomach and chest.        HPI: Nazanin Malone is a 44-year-old female with sickle cell beta thalassemia zero, multiple pulmonary emboli on chronic AC, asthma, and HTN who presented to [facility] ED 2/2/25 for diffuse myalgias and pain consistent with a sickle cell pain crisis as well as low grade fever, nausea, vomiting, and diarrhea for the past 2 days. She states that she has been having issues with gastritis recently, with ongoing nausea, vomiting, and watery diarrhea that has been exacerbating her pain. She went to the ED on 1/29/25 with mild relief of pain and was discharged. Her pain continued to persist and she noted a subjective fever of 100.8 so she decided to present again to the ED. She was recently admitted to [facility] 1/7/25 - 1/25/25 for pain c/w sickle cell pain crises which was refractory to her home regimen of hydrea, folic acid oxy. Patient recently moved here from [place]. She was receiving chronic exchange transfusions there but is not currently receiving them as part of her therapy here. She is seeing a hematologist. Of note, patient requested administration of further dilaudid than ordered from nurses. Also asked if nursing needed help to throw away empty syringes and empty med syringe. Patient at recent admission was started on a PCA pump given difficulty weaning dilaudid and also found to have L cephalic  vein and partial occlusive thrombus of LIJ.      In the ED, /108 HR 78 RR 20 sats adequate on room air. Tmax 100.9. Labs notable for Hgb 8.2. reticulocyte 1.4. UA was ordered. CXR negative for acute infectious process. Patient received 5 mg dilaudid without resolution of pain, benadryl, and phenergan. Patient admitted to hospital medicine for further workup and management.    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    Asthma     Avascular necrosis of femur     Depression     History of pulmonary embolism     Hypertension     Morbid obesity     Opioid dependence 2017    Pneumonia due to Streptococcus pneumoniae 2017    Right lower lobe pneumonia 2017    Sepsis due to Streptococcus pneumoniae 2017    Sickle cell-beta thalassemia disease with pain     Trigeminal neuralgia        Past Surgical History:   Procedure Laterality Date     SECTION      ESOPHAGOGASTRODUODENOSCOPY N/A 2024    Procedure: EGD (ESOPHAGOGASTRODUODENOSCOPY);  Surgeon: Allen Marshall MD;  Location: [address]);  Service: Gastroenterology;  Laterality: N/A;    TONSILLECTOMY      TUBAL LIGATION         Review of patient's allergies indicates:   Allergen Reactions    Cat dander Anaphylaxis, Itching and Shortness Of Breath    Nsaids (non-steroidal anti-inflammatory drug) Itching and Anaphylaxis    Latex Rash       No current facility-administered medications on file prior to encounter.     Current Outpatient Medications on File Prior to Encounter   Medication Sig    amLODIPine (NORVASC) 10 MG tablet Take 1 tablet (10 mg total) by mouth once daily.    apixaban (ELIQUIS) 5 mg Tab Take 1 tablet (5 mg total) by mouth 2 (two) times daily.    metoclopramide HCl (REGLAN) 5 MG tablet Take 1 tablet (5 mg total) by mouth 3 (three) times daily before meals.    morphine (MS CONTIN) 30 MG 12 hr tablet Take  1 tablet (30 mg total) by mouth every 12 (twelve) hours. for 5 days    pantoprazole (PROTONIX) 40 MG tablet Take 1 tablet (40 mg total) by mouth 2 (two) times daily.    sucralfate (CARAFATE) 1 gram tablet Take 1 tablet (1 g total) by mouth 4 (four) times daily before meals and nightly.    acetaminophen (TYLENOL) 500 MG tablet Take 1,000 mg by mouth every 8 (eight) hours as needed for Pain.    albuterol (VENTOLIN HFA) 90 mcg/actuation inhaler Inhale 2 puffs into the lungs every 6 (six) hours as needed for Wheezing or Shortness of Breath. Rescue    ascorbic acid (VITAMIN C ORAL) Take 1 capsule by mouth once daily.    FLUoxetine 40 MG capsule Take 1 capsule (40 mg total) by mouth once daily.    fluticasone propionate (FLONASE) 50 mcg/actuation nasal spray INSTILL 1 SPRAY INTO EACH NOSTRIL EVERY DAY    folic acid (FOLVITE) 1 MG tablet Take 1 tablet (1 mg total) by mouth once daily.    gabapentin (NEURONTIN) 300 MG capsule Take 2 capsules (600 mg total) by mouth 3 (three) times daily.    [] HYDROcodone-acetaminophen (NORCO)  mg per tablet Take 1 tablet by mouth every 6 (six) hours as needed for Pain.    hydroxyurea (HYDREA) 500 mg Cap Take 2 capsules (1,000 mg total) by mouth once daily.    hydrOXYzine pamoate (VISTARIL) 25 MG Cap Take 1 capsule (25 mg total) by mouth every 4 (four) hours as needed (Anxiety).    methocarbamoL (ROBAXIN) 750 MG Tab Take 1 tablet (750 mg total) by mouth 3 (three) times daily as needed (muscle pain).    senna-docusate 8.6-50 mg (PERICOLACE) 8.6-50 mg per tablet Take 1 tablet by mouth daily as needed for Constipation.     Family History       Problem Relation (Age of Onset)    Diabetes Mother    Heart disease Mother, Father          Tobacco Use    Smoking status: Never    Smokeless tobacco: Never   Substance and Sexual Activity    Alcohol use: No    Drug use: Yes     Types: Marijuana     Comment: periodically     Sexual activity: Not Currently     Birth control/protection: See  Surgical Hx     Review of Systems   Constitutional:  Positive for chills. Negative for fever.   HENT:  Negative for rhinorrhea.    Respiratory:  Negative for cough and shortness of breath.    Gastrointestinal:  Positive for abdominal pain, diarrhea, nausea and vomiting.   Genitourinary:  Negative for dysuria.   Neurological:  Negative for weakness.   Psychiatric/Behavioral:  Negative for agitation and confusion.      Objective:     Vital Signs (Most Recent):  Temp: 99.6 °F (37.6 °C) (02/02/25 1512)  Pulse: 78 (02/02/25 1512)  Resp: 19 (02/02/25 1742)  BP: (!) 184/108 (02/02/25 1512)  SpO2: 100 % (02/02/25 1512) Vital Signs (24h Range):  Temp:  [99 °F (37.2 °C)-99.6 °F (37.6 °C)] 99.6 °F (37.6 °C)  Pulse:  [78-92] 78  Resp:  [19-22] 19  SpO2:  [98 %-100 %] 100 %  BP: (145-184)/(107-108) 184/108     Weight: 94.3 kg (208 lb)  Body mass index is 38.04 kg/m².     Physical Exam  Vitals and nursing note reviewed.   Constitutional:       General: She is not in acute distress.     Appearance: Normal appearance. She is not ill-appearing.   HENT:      Nose: No congestion or rhinorrhea.      Mouth/Throat:      Pharynx: Oropharynx is clear. No oropharyngeal exudate.   Eyes:      General: No scleral icterus.     Extraocular Movements: Extraocular movements intact.      Conjunctiva/sclera: Conjunctivae normal.   Cardiovascular:      Rate and Rhythm: Normal rate and regular rhythm.      Pulses: Normal pulses.      Heart sounds: Normal heart sounds. No murmur heard.     No friction rub. No gallop.   Pulmonary:      Effort: Pulmonary effort is normal. No respiratory distress.      Breath sounds: Normal breath sounds. No stridor. No wheezing, rhonchi or rales.   Chest:      Chest wall: No tenderness.   Abdominal:      General: Abdomen is flat. There is no distension.      Palpations: Abdomen is soft.      Tenderness: There is abdominal tenderness (mild). There is no guarding or rebound.   Musculoskeletal:      Right lower leg: No  edema.      Left lower leg: No edema.   Skin:     General: Skin is warm.      Coloration: Skin is not jaundiced.      Findings: No erythema.   Neurological:      Mental Status: She is alert and oriented to person, place, and time.   Psychiatric:         Mood and Affect: Mood normal.         Behavior: Behavior normal.                Significant Labs: All pertinent labs within the past 24 hours have been reviewed.    Significant Imaging: I have reviewed all pertinent imaging results/findings within the past 24 hours.  Assessment/Plan:     * Vaso-occlusive pain due to sickle cell disease  She was recently admitted to [facility] 1/7-1/25 for a pain crisis.  CXR without focal consolidation.   Patient on RA, denies SOB/cough  No concern for acute chest on admission    - Multimodal pain regimen scheduled tylenol, robaxin  - Dilaudid PCA   - Folic acid, hydroxyurea    Chronic prescription opiate use  - See Vaso-occlusive pain    Hx pulmonary embolism  Multiple emboli noted in past.    -Continue home anticoagulation    Chronic gastritis  Chronic and stable.  Associated with chronic diarrhea  Reports ongoing issue    - C.diff unlikely but will check  - Continue home PPI and carafate    Obesity (BMI 30-39.9)  Body mass index is 38.04 kg/m². Morbid obesity complicates all aspects of disease management from diagnostic modalities to treatment. Weight loss encouraged and health benefits explained to patient.         Hypertension  Patient's blood pressure range in the last 24 hours was: BP  Min: 145/107  Max: 184/108.The patient's inpatient anti-hypertensive regimen is listed below:  Current Antihypertensives  amLODIPine tablet 10 mg, Daily, Oral    Plan  - BP is controlled, no changes needed to their regimen      VTE Risk Mitigation (From admission, onward)           Ordered     apixaban tablet 5 mg  2 times daily         02/02/25 1920                       On 02/02/2025, patient should be placed in hospital observation services under my  care in collaboration with Dr. Mckeon.         Bubba Hensley MD  Department of Hospital Medicine  [facility] - Emergency Dept

## 2025-02-03 NOTE — SUBJECTIVE & OBJECTIVE
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    Asthma     Avascular necrosis of femur     Depression     History of pulmonary embolism     Hypertension     Morbid obesity     Opioid dependence 2017    Pneumonia due to Streptococcus pneumoniae 2017    Right lower lobe pneumonia 2017    Sepsis due to Streptococcus pneumoniae 2017    Sickle cell-beta thalassemia disease with pain     Trigeminal neuralgia        Past Surgical History:   Procedure Laterality Date     SECTION      ESOPHAGOGASTRODUODENOSCOPY N/A 2024    Procedure: EGD (ESOPHAGOGASTRODUODENOSCOPY);  Surgeon: Allen Marshall MD;  Location: [address]);  Service: Gastroenterology;  Laterality: N/A;    TONSILLECTOMY      TUBAL LIGATION         Review of patient's allergies indicates:   Allergen Reactions    Cat dander Anaphylaxis, Itching and Shortness Of Breath    Nsaids (non-steroidal anti-inflammatory drug) Itching and Anaphylaxis    Latex Rash       No current facility-administered medications on file prior to encounter.     Current Outpatient Medications on File Prior to Encounter   Medication Sig    amLODIPine (NORVASC) 10 MG tablet Take 1 tablet (10 mg total) by mouth once daily.    apixaban (ELIQUIS) 5 mg Tab Take 1 tablet (5 mg total) by mouth 2 (two) times daily.    metoclopramide HCl (REGLAN) 5 MG tablet Take 1 tablet (5 mg total) by mouth 3 (three) times daily before meals.    morphine (MS CONTIN) 30 MG 12 hr tablet Take 1 tablet (30 mg total) by mouth every 12 (twelve) hours. for 5 days    pantoprazole (PROTONIX) 40 MG tablet Take 1 tablet (40 mg total) by mouth 2 (two) times daily.    sucralfate (CARAFATE) 1 gram tablet Take 1 tablet (1 g total) by mouth 4 (four) times daily before meals and nightly.    acetaminophen (TYLENOL) 500 MG tablet Take 1,000 mg by mouth every 8 (eight) hours as needed  for Pain.    albuterol (VENTOLIN HFA) 90 mcg/actuation inhaler Inhale 2 puffs into the lungs every 6 (six) hours as needed for Wheezing or Shortness of Breath. Rescue    ascorbic acid (VITAMIN C ORAL) Take 1 capsule by mouth once daily.    FLUoxetine 40 MG capsule Take 1 capsule (40 mg total) by mouth once daily.    fluticasone propionate (FLONASE) 50 mcg/actuation nasal spray INSTILL 1 SPRAY INTO EACH NOSTRIL EVERY DAY    folic acid (FOLVITE) 1 MG tablet Take 1 tablet (1 mg total) by mouth once daily.    gabapentin (NEURONTIN) 300 MG capsule Take 2 capsules (600 mg total) by mouth 3 (three) times daily.    [] HYDROcodone-acetaminophen (NORCO)  mg per tablet Take 1 tablet by mouth every 6 (six) hours as needed for Pain.    hydroxyurea (HYDREA) 500 mg Cap Take 2 capsules (1,000 mg total) by mouth once daily.    hydrOXYzine pamoate (VISTARIL) 25 MG Cap Take 1 capsule (25 mg total) by mouth every 4 (four) hours as needed (Anxiety).    methocarbamoL (ROBAXIN) 750 MG Tab Take 1 tablet (750 mg total) by mouth 3 (three) times daily as needed (muscle pain).    senna-docusate 8.6-50 mg (PERICOLACE) 8.6-50 mg per tablet Take 1 tablet by mouth daily as needed for Constipation.     Family History       Problem Relation (Age of Onset)    Diabetes Mother    Heart disease Mother, Father          Tobacco Use    Smoking status: Never    Smokeless tobacco: Never   Substance and Sexual Activity    Alcohol use: No    Drug use: Yes     Types: Marijuana     Comment: periodically     Sexual activity: Not Currently     Birth control/protection: See Surgical Hx     Review of Systems   Constitutional:  Positive for chills. Negative for fever.   HENT:  Negative for rhinorrhea.    Respiratory:  Negative for cough and shortness of breath.    Gastrointestinal:  Positive for abdominal pain, diarrhea, nausea and vomiting.   Genitourinary:  Negative for dysuria.   Neurological:  Negative for weakness.   Psychiatric/Behavioral:   Negative for agitation and confusion.      Objective:     Vital Signs (Most Recent):  Temp: 99.6 °F (37.6 °C) (02/02/25 1512)  Pulse: 78 (02/02/25 1512)  Resp: 19 (02/02/25 1742)  BP: (!) 184/108 (02/02/25 1512)  SpO2: 100 % (02/02/25 1512) Vital Signs (24h Range):  Temp:  [99 °F (37.2 °C)-99.6 °F (37.6 °C)] 99.6 °F (37.6 °C)  Pulse:  [78-92] 78  Resp:  [19-22] 19  SpO2:  [98 %-100 %] 100 %  BP: (145-184)/(107-108) 184/108     Weight: 94.3 kg (208 lb)  Body mass index is 38.04 kg/m².     Physical Exam  Vitals and nursing note reviewed.   Constitutional:       General: She is not in acute distress.     Appearance: Normal appearance. She is not ill-appearing.   HENT:      Nose: No congestion or rhinorrhea.      Mouth/Throat:      Pharynx: Oropharynx is clear. No oropharyngeal exudate.   Eyes:      General: No scleral icterus.     Extraocular Movements: Extraocular movements intact.      Conjunctiva/sclera: Conjunctivae normal.   Cardiovascular:      Rate and Rhythm: Normal rate and regular rhythm.      Pulses: Normal pulses.      Heart sounds: Normal heart sounds. No murmur heard.     No friction rub. No gallop.   Pulmonary:      Effort: Pulmonary effort is normal. No respiratory distress.      Breath sounds: Normal breath sounds. No stridor. No wheezing, rhonchi or rales.   Chest:      Chest wall: No tenderness.   Abdominal:      General: Abdomen is flat. There is no distension.      Palpations: Abdomen is soft.      Tenderness: There is abdominal tenderness (mild). There is no guarding or rebound.   Musculoskeletal:      Right lower leg: No edema.      Left lower leg: No edema.   Skin:     General: Skin is warm.      Coloration: Skin is not jaundiced.      Findings: No erythema.   Neurological:      Mental Status: She is alert and oriented to person, place, and time.   Psychiatric:         Mood and Affect: Mood normal.         Behavior: Behavior normal.                Significant Labs: All pertinent labs within the  past 24 hours have been reviewed.    Significant Imaging: I have reviewed all pertinent imaging results/findings within the past 24 hours.

## 2025-02-03 NOTE — PROVIDER PROGRESS NOTES - EMERGENCY DEPT.
Encounter Date: 2/2/2025    ED Physician Progress Notes            Patient is a 44-year-old male with past medical history sickle cell beta thalassemia that is presenting for typical sickle cell pain.  Patient has had typical sickle cell pain for the past 3 days in abdomen and bilateral lower extremities.  The patient denies any fevers, chills, chest pains, shortness of breath.  Patient endorses nausea/vomiting.  Patient states that this is typical of her sickle cell pain crisis .  Patient has been recently seen in the ED on 01/29/2025 and recently discharged from the hospital on 01/25/2025.  Patient was handed off to me by previous ED team pending treatment.    Physical exam: Well-appearing 44-year-old female, no distress, appropriately conversational.  Benign cardiac, respiratory, abdominal exam.  The patient is breathing comfortably on room air.    Plan:  Unable to achieve symptomatic control after 3 doses.  Consulted Hospital Medicine for admission/observation.  The patient agrees with treatment and plan.

## 2025-02-03 NOTE — SUBJECTIVE & OBJECTIVE
Interval History: NAEON and VSS. Patient feeling better after receiving fluids but notes continued abdominal, bilateral leg pain. Had two watery bowel movements overnight and still notes nausea. Pruritus unresolved by p.o benadryl. Denies fevers, chills, chest pain, shortness of breath.     Review of Systems   Constitutional:  Positive for activity change. Negative for chills and fever.   HENT:  Negative for rhinorrhea.    Respiratory:  Negative for cough, shortness of breath and wheezing.    Cardiovascular:  Negative for chest pain, palpitations and leg swelling.   Gastrointestinal:  Positive for abdominal pain, diarrhea and nausea. Negative for abdominal distention, constipation and vomiting.   Genitourinary:  Negative for dysuria.   Musculoskeletal:  Positive for arthralgias.   Neurological:  Negative for weakness.   Psychiatric/Behavioral:  Negative for agitation and confusion.    All other systems reviewed and are negative.    Objective:     Vital Signs (Most Recent):  Temp: 98.8 °F (37.1 °C) (02/03/25 1100)  Pulse: 74 (02/03/25 1100)  Resp: 16 (02/03/25 1253)  BP: 109/63 (02/03/25 1100)  SpO2: 98 % (02/03/25 1100) Vital Signs (24h Range):  Temp:  [97 °F (36.1 °C)-99.6 °F (37.6 °C)] 98.8 °F (37.1 °C)  Pulse:  [66-80] 74  Resp:  [15-20] 16  SpO2:  [92 %-100 %] 98 %  BP: (109-184)/() 109/63     Weight: 93.9 kg (207 lb)  Body mass index is 37.86 kg/m².    Intake/Output Summary (Last 24 hours) at 2/3/2025 1323  Last data filed at 2/3/2025 0359  Gross per 24 hour   Intake 50 ml   Output 1 ml   Net 49 ml         Physical Exam  Vitals and nursing note reviewed.   Constitutional:       General: She is not in acute distress.     Appearance: Normal appearance. She is not ill-appearing.   HENT:      Head: Normocephalic and atraumatic.      Nose: No congestion or rhinorrhea.      Mouth/Throat:      Pharynx: Oropharynx is clear. No oropharyngeal exudate.   Eyes:      General: No scleral icterus.     Extraocular  Movements: Extraocular movements intact.      Conjunctiva/sclera: Conjunctivae normal.   Cardiovascular:      Rate and Rhythm: Normal rate and regular rhythm.      Pulses: Normal pulses.      Heart sounds: Normal heart sounds. No murmur heard.     No friction rub. No gallop.   Pulmonary:      Effort: Pulmonary effort is normal. No respiratory distress.      Breath sounds: Normal breath sounds. No stridor. No wheezing, rhonchi or rales.   Chest:      Chest wall: No tenderness.   Abdominal:      General: Abdomen is flat. There is no distension.      Palpations: Abdomen is soft.      Tenderness: There is abdominal tenderness (mild). There is no guarding or rebound.   Musculoskeletal:      Right lower leg: No edema.      Left lower leg: No edema.   Skin:     General: Skin is warm.      Coloration: Skin is not jaundiced.      Findings: No erythema.   Neurological:      Mental Status: She is alert and oriented to person, place, and time.   Psychiatric:         Mood and Affect: Mood normal.         Behavior: Behavior normal.             Significant Labs: All pertinent labs within the past 24 hours have been reviewed.    Significant Imaging: I have reviewed all pertinent imaging results/findings within the past 24 hours.

## 2025-02-03 NOTE — ASSESSMENT & PLAN NOTE
Body mass index is 38.04 kg/m². Morbid obesity complicates all aspects of disease management from diagnostic modalities to treatment. Weight loss encouraged and health benefits explained to patient.

## 2025-02-03 NOTE — NURSING
Patient arrived to room 510 via wheelchair. Ambulated from wheelchair to bed without incident. AAOx4. VS stable. PCA with ETCO2. Oxygen 2L nasal cannula placed. No concerns at this time. Will continue to monitor.

## 2025-02-03 NOTE — PLAN OF CARE
[facility]  Initial Discharge Assessment       Primary Care Provider: Kezia, Primary Doctor    Admission Diagnosis: Sickle cell pain crisis [D57.00]    Admission Date: 2/2/2025  Expected Discharge Date: 2/6/2025    Transition of Care Barriers: None    Payor: [employer] MANAGED MEDICARE / Plan: [employer] MEDICARE PLAN HMO / Product Type: Medicare Advantage /     Extended Emergency Contact Information  Primary Emergency Contact: Jack Malone  Mobile Phone: [phone]  Relation: Brother    Discharge Plan A: Home with family  Discharge Plan B: Home Health, Home with family      [facility]  [address] [address]  Phone: [phone] Fax: [phone]    [facility]  [address] [facility] [address]  [facility] [place] [address]  Phone: [phone] Fax: [phone]      Initial Assessment (most recent)       Adult Discharge Assessment - 02/03/25 1137          Discharge Assessment    Assessment Type Discharge Planning Assessment     Confirmed/corrected address, phone number and insurance Yes     Confirmed Demographics Correct on Facesheet     Source of Information patient     Communicated ALYSE with patient/caregiver Yes     People in Home parent(s);child(jayce), adult;grandchild(jayce)     Do you expect to return to your current living situation? Yes     Do you have help at home or someone to help you manage your care at home? Yes     Prior to hospitilization cognitive status: Alert/Oriented     Current cognitive status: Alert/Oriented     Walking or Climbing Stairs Difficulty no     Dressing/Bathing Difficulty no     Home Accessibility stairs to enter home;stairs within home     Number of Stairs, Within Home, Primary three     Number of Stairs, Main Entrance three     Stair Railings, Main Entrance railings safe and in good condition     Home Layout Able to live on 1st floor     Equipment Currently Used at Home none     Readmission within 30 days? No     Patient currently being followed by  outpatient case management? No     Do you currently have service(s) that help you manage your care at home? No     Do you take prescription medications? Yes     Do you have prescription coverage? Yes     Do you have any problems affording any of your prescribed medications? No     Is the patient taking medications as prescribed? yes     How do you get to doctors appointments? car, drives self;family or friend will provide     Are you on dialysis? No     Do you take coumadin? No     Discharge Plan A Home with family     Discharge Plan B Home Health;Home with family     DME Needed Upon Discharge  none     Discharge Plan discussed with: Patient     Transition of Care Barriers None                   SW completed discharge planning assessment with the patient at bedside. SW verified demographic information listed on the pt.'s Face sheet. Pt reports living in a single story home (3 steps to enter, 3 steps down into room) with her mother, children, and grandchildren, whom she plans to help manage her care upon d/c. Pt reports having a great support system and has stable transport to follow up appointments and upon d/c.     Pt requested assistance on home O2 and quad cane. Pt reports receiving home O2 currently, however recently moved from [place] and needs a new Oxygen provider. SW informed BRANNON and MD (Dr. DOMINIC Mckeon). SW to follow.    Veronica Weldon, [facility]  Case Management   [facility]   Ext. [phone]

## 2025-02-03 NOTE — ASSESSMENT & PLAN NOTE
She was recently admitted to [facility] 1/7-1/25 for a pain crisis.  CXR without focal consolidation.   Patient on RA, denies SOB/cough  No concern for acute chest on admission    - Multimodal pain regimen scheduled tylenol, robaxin  - Dilaudid PCA   - Folic acid, hydroxyurea

## 2025-02-04 LAB
ABO + RH BLD: ABNORMAL
ALBUMIN SERPL BCP-MCNC: 3.2 G/DL (ref 3.5–5.2)
ALP SERPL-CCNC: 73 U/L (ref 40–150)
ALT SERPL W/O P-5'-P-CCNC: 7 U/L (ref 10–44)
ANION GAP SERPL CALC-SCNC: 6 MMOL/L (ref 8–16)
ANISOCYTOSIS BLD QL SMEAR: SLIGHT
AST SERPL-CCNC: 13 U/L (ref 10–40)
BASOPHILS # BLD AUTO: 0.05 K/UL (ref 0–0.2)
BASOPHILS NFR BLD: 0.8 % (ref 0–1.9)
BILIRUB SERPL-MCNC: 0.3 MG/DL (ref 0.1–1)
BLD GP AB SCN CELLS X3 SERPL QL: ABNORMAL
BLD PROD TYP BPU: NORMAL
BLOOD GROUP ANTIBODIES SERPL: NORMAL
BLOOD UNIT EXPIRATION DATE: NORMAL
BLOOD UNIT TYPE CODE: 5100
BLOOD UNIT TYPE: NORMAL
BUN SERPL-MCNC: 17 MG/DL (ref 6–20)
CALCIUM SERPL-MCNC: 8.7 MG/DL (ref 8.7–10.5)
CHLORIDE SERPL-SCNC: 107 MMOL/L (ref 95–110)
CO2 SERPL-SCNC: 22 MMOL/L (ref 23–29)
CODING SYSTEM: NORMAL
CREAT SERPL-MCNC: 1.3 MG/DL (ref 0.5–1.4)
CROSSMATCH INTERPRETATION: NORMAL
DACRYOCYTES BLD QL SMEAR: ABNORMAL
DIFFERENTIAL METHOD BLD: ABNORMAL
DISPENSE STATUS: NORMAL
EOSINOPHIL # BLD AUTO: 0.3 K/UL (ref 0–0.5)
EOSINOPHIL NFR BLD: 5.1 % (ref 0–8)
ERYTHROCYTE [DISTWIDTH] IN BLOOD BY AUTOMATED COUNT: 23.1 % (ref 11.5–14.5)
EST. GFR  (NO RACE VARIABLE): 51.4 ML/MIN/1.73 M^2
FERRITIN SERPL-MCNC: 31 NG/ML (ref 20–300)
GLUCOSE SERPL-MCNC: 110 MG/DL (ref 70–110)
HAPTOGLOB SERPL-MCNC: 36 MG/DL (ref 30–250)
HCT VFR BLD AUTO: 22.1 % (ref 37–48.5)
HGB BLD-MCNC: 7 G/DL (ref 12–16)
HOWELL-JOLLY BOD BLD QL SMEAR: ABNORMAL
HYPOCHROMIA BLD QL SMEAR: ABNORMAL
IMM GRANULOCYTES # BLD AUTO: 0 K/UL (ref 0–0.04)
IMM GRANULOCYTES NFR BLD AUTO: 0 % (ref 0–0.5)
IRON SERPL-MCNC: 155 UG/DL (ref 30–160)
LYMPHOCYTES # BLD AUTO: 4.2 K/UL (ref 1–4.8)
LYMPHOCYTES NFR BLD: 68.3 % (ref 18–48)
MAGNESIUM SERPL-MCNC: 1.8 MG/DL (ref 1.6–2.6)
MCH RBC QN AUTO: 21.6 PG (ref 27–31)
MCHC RBC AUTO-ENTMCNC: 31.7 G/DL (ref 32–36)
MCV RBC AUTO: 68 FL (ref 82–98)
MONOCYTES # BLD AUTO: 0.9 K/UL (ref 0.3–1)
MONOCYTES NFR BLD: 15.3 % (ref 4–15)
NEUTROPHILS # BLD AUTO: 0.6 K/UL (ref 1.8–7.7)
NEUTROPHILS NFR BLD: 10.5 % (ref 38–73)
NRBC BLD-RTO: 2 /100 WBC
NUM UNITS TRANS PACKED RBC: NORMAL
PHOSPHATE SERPL-MCNC: 4.3 MG/DL (ref 2.7–4.5)
PLATELET # BLD AUTO: 215 K/UL (ref 150–450)
PLATELET BLD QL SMEAR: ABNORMAL
PMV BLD AUTO: 8.9 FL (ref 9.2–12.9)
POIKILOCYTOSIS BLD QL SMEAR: SLIGHT
POLYCHROMASIA BLD QL SMEAR: ABNORMAL
POTASSIUM SERPL-SCNC: 3.8 MMOL/L (ref 3.5–5.1)
PROT SERPL-MCNC: 6.6 G/DL (ref 6–8.4)
RBC # BLD AUTO: 3.24 M/UL (ref 4–5.4)
RETICS/RBC NFR AUTO: 1.2 % (ref 0.5–2.5)
SATURATED IRON: 34 % (ref 20–50)
SCHISTOCYTES BLD QL SMEAR: ABNORMAL
SCHISTOCYTES BLD QL SMEAR: PRESENT
SODIUM SERPL-SCNC: 135 MMOL/L (ref 136–145)
SPECIMEN OUTDATE: ABNORMAL
TARGETS BLD QL SMEAR: ABNORMAL
TOTAL IRON BINDING CAPACITY: 453 UG/DL (ref 250–450)
TRANSFERRIN SERPL-MCNC: 306 MG/DL (ref 200–375)
VIT B12 SERPL-MCNC: 399 PG/ML (ref 210–950)
WBC # BLD AUTO: 6.09 K/UL (ref 3.9–12.7)

## 2025-02-04 PROCEDURE — 84100 ASSAY OF PHOSPHORUS: CPT

## 2025-02-04 PROCEDURE — 86901 BLOOD TYPING SEROLOGIC RH(D): CPT

## 2025-02-04 PROCEDURE — 99900035 HC TECH TIME PER 15 MIN (STAT)

## 2025-02-04 PROCEDURE — 30233N1 TRANSFUSION OF NONAUTOLOGOUS RED BLOOD CELLS INTO PERIPHERAL VEIN, PERCUTANEOUS APPROACH: ICD-10-PCS | Performed by: HOSPITALIST

## 2025-02-04 PROCEDURE — 86870 RBC ANTIBODY IDENTIFICATION: CPT

## 2025-02-04 PROCEDURE — 76937 US GUIDE VASCULAR ACCESS: CPT

## 2025-02-04 PROCEDURE — 83010 ASSAY OF HAPTOGLOBIN QUANT: CPT

## 2025-02-04 PROCEDURE — 86922 COMPATIBILITY TEST ANTIGLOB: CPT

## 2025-02-04 PROCEDURE — 85045 AUTOMATED RETICULOCYTE COUNT: CPT

## 2025-02-04 PROCEDURE — 36415 COLL VENOUS BLD VENIPUNCTURE: CPT

## 2025-02-04 PROCEDURE — P9016 RBC LEUKOCYTES REDUCED: HCPCS

## 2025-02-04 PROCEDURE — 36430 TRANSFUSION BLD/BLD COMPNT: CPT

## 2025-02-04 PROCEDURE — C1751 CATH, INF, PER/CENT/MIDLINE: HCPCS

## 2025-02-04 PROCEDURE — 25000003 PHARM REV CODE 250

## 2025-02-04 PROCEDURE — 80053 COMPREHEN METABOLIC PANEL: CPT

## 2025-02-04 PROCEDURE — 11000001 HC ACUTE MED/SURG PRIVATE ROOM

## 2025-02-04 PROCEDURE — 36410 VNPNXR 3YR/> PHY/QHP DX/THER: CPT

## 2025-02-04 PROCEDURE — 86905 BLOOD TYPING RBC ANTIGENS: CPT

## 2025-02-04 PROCEDURE — 86077 PHYS BLOOD BANK SERV XMATCH: CPT | Mod: ,,, | Performed by: PATHOLOGY

## 2025-02-04 PROCEDURE — 85025 COMPLETE CBC W/AUTO DIFF WBC: CPT

## 2025-02-04 PROCEDURE — 82607 VITAMIN B-12: CPT

## 2025-02-04 PROCEDURE — 82728 ASSAY OF FERRITIN: CPT

## 2025-02-04 PROCEDURE — 84466 ASSAY OF TRANSFERRIN: CPT

## 2025-02-04 PROCEDURE — 36415 COLL VENOUS BLD VENIPUNCTURE: CPT | Mod: XB

## 2025-02-04 PROCEDURE — 83735 ASSAY OF MAGNESIUM: CPT

## 2025-02-04 RX ORDER — HYDROCODONE BITARTRATE AND ACETAMINOPHEN 500; 5 MG/1; MG/1
TABLET ORAL
Status: DISCONTINUED | OUTPATIENT
Start: 2025-02-04 | End: 2025-02-05

## 2025-02-04 RX ORDER — SODIUM CHLORIDE 0.9 % (FLUSH) 0.9 %
10 SYRINGE (ML) INJECTION EVERY 12 HOURS PRN
Status: DISCONTINUED | OUTPATIENT
Start: 2025-02-04 | End: 2025-02-06 | Stop reason: HOSPADM

## 2025-02-04 RX ADMIN — FLUOXETINE HYDROCHLORIDE 40 MG: 20 CAPSULE ORAL at 09:02

## 2025-02-04 RX ADMIN — Medication: at 08:02

## 2025-02-04 RX ADMIN — ACETAMINOPHEN 1000 MG: 500 TABLET ORAL at 09:02

## 2025-02-04 RX ADMIN — APIXABAN 5 MG: 5 TABLET, FILM COATED ORAL at 09:02

## 2025-02-04 RX ADMIN — GABAPENTIN 600 MG: 300 CAPSULE ORAL at 04:02

## 2025-02-04 RX ADMIN — TIZANIDINE 4 MG: 4 TABLET ORAL at 05:02

## 2025-02-04 RX ADMIN — SUCRALFATE 1 G: 1 TABLET ORAL at 05:02

## 2025-02-04 RX ADMIN — ACETAMINOPHEN 1000 MG: 500 TABLET ORAL at 04:02

## 2025-02-04 RX ADMIN — GABAPENTIN 600 MG: 300 CAPSULE ORAL at 08:02

## 2025-02-04 RX ADMIN — Medication: at 04:02

## 2025-02-04 RX ADMIN — TIZANIDINE 4 MG: 4 TABLET ORAL at 08:02

## 2025-02-04 RX ADMIN — TIZANIDINE 4 MG: 4 TABLET ORAL at 01:02

## 2025-02-04 RX ADMIN — AMLODIPINE BESYLATE 10 MG: 5 TABLET ORAL at 09:02

## 2025-02-04 RX ADMIN — SUCRALFATE 1 G: 1 TABLET ORAL at 08:02

## 2025-02-04 RX ADMIN — FOLIC ACID 1 MG: 1 TABLET ORAL at 09:02

## 2025-02-04 RX ADMIN — SUCRALFATE 1 G: 1 TABLET ORAL at 04:02

## 2025-02-04 RX ADMIN — ACETAMINOPHEN 1000 MG: 500 TABLET ORAL at 08:02

## 2025-02-04 RX ADMIN — APIXABAN 5 MG: 5 TABLET, FILM COATED ORAL at 08:02

## 2025-02-04 RX ADMIN — SUCRALFATE 1 G: 1 TABLET ORAL at 11:02

## 2025-02-04 RX ADMIN — PANTOPRAZOLE SODIUM 40 MG: 40 TABLET, DELAYED RELEASE ORAL at 09:02

## 2025-02-04 RX ADMIN — GABAPENTIN 600 MG: 300 CAPSULE ORAL at 09:02

## 2025-02-04 NOTE — ASSESSMENT & PLAN NOTE
44 y.o F with pmhx sickle cell beta thalassemia zero, multiple pulmonary emboli on chronic AC, asthma, and HTN who presented to [facility] ED for diffuse myalgias and pain consistent with a sickle cell pain crisis as well as reported fever (100.8), nausea, vomiting, and diarrhea for the past day. She was recently admitted to [facility] 1/7-1/25 for a similar pain crisis. CXR without focal consolidation, on RA and denying SOB, cough, chest pain. No concern for acute chest on admission.    - Multimodal pain regimen scheduled tylenol, tizanidine  - Dilaudid PCA   - Folic acid, hydroxyurea

## 2025-02-04 NOTE — NURSING
CHARGE NURSE NOTE:  I called to report to Dr. Bubba Hensley on the attending team that I placed a new PCA syringe this am at approximately 0828 and this was verified by a second nurse.   At or about 1500 the syringe is empty.   The settings are just as ordered by the attending.  There is no noted evidence of tampering with the pump or wasted Dilaudid on the floor or bed.   The patient is aaox4 and no sign of distress noted.   Will verify and recheck setting prior to placing new syringe.   The OC has been notified of the above.   The manager Fernanda Hsieh has been notified.

## 2025-02-04 NOTE — ASSESSMENT & PLAN NOTE
Patient with history of gastritis that is chronic and stable. Associated with chronic diarrhea. Notes recent episodes of vomiting and diarrhea prior to admission. Will give fluids given c/f dehydration and continue home meds  Diarrhea resolved    - Continue home PPI and carafate

## 2025-02-04 NOTE — SUBJECTIVE & OBJECTIVE
Interval History: DASH GASTON, will transfuse 1 u pRBC for symptomatic anemia. Will continue PCA pump as is, with plan to de-escalate tomorrow.    Review of Systems   Constitutional:  Positive for activity change. Negative for chills and fever.   HENT:  Negative for rhinorrhea.    Respiratory:  Negative for cough, shortness of breath and wheezing.    Cardiovascular:  Negative for chest pain, palpitations and leg swelling.   Gastrointestinal:  Positive for abdominal pain. Negative for abdominal distention, constipation, diarrhea, nausea and vomiting.   Genitourinary:  Negative for dysuria.   Musculoskeletal:  Positive for arthralgias.   Neurological:  Negative for weakness.   Psychiatric/Behavioral:  Negative for agitation and confusion.    All other systems reviewed and are negative.    Objective:     Vital Signs (Most Recent):  Temp: 98.7 °F (37.1 °C) (02/04/25 1204)  Pulse: 80 (02/04/25 1204)  Resp: 20 (02/04/25 1204)  BP: 131/68 (02/04/25 1204)  SpO2: 98 % (02/04/25 1204) Vital Signs (24h Range):  Temp:  [98.2 °F (36.8 °C)-98.8 °F (37.1 °C)] 98.7 °F (37.1 °C)  Pulse:  [69-80] 80  Resp:  [16-20] 20  SpO2:  [98 %-100 %] 98 %  BP: ()/(54-68) 131/68     Weight: 93.9 kg (207 lb)  Body mass index is 37.86 kg/m².    Intake/Output Summary (Last 24 hours) at 2/4/2025 1316  Last data filed at 2/4/2025 0653  Gross per 24 hour   Intake 1905 ml   Output --   Net 1905 ml         Physical Exam  Vitals and nursing note reviewed.   Constitutional:       General: She is not in acute distress.     Appearance: Normal appearance. She is not ill-appearing.   HENT:      Head: Normocephalic and atraumatic.      Nose: No congestion or rhinorrhea.      Mouth/Throat:      Pharynx: Oropharynx is clear. No oropharyngeal exudate.   Eyes:      General: No scleral icterus.     Extraocular Movements: Extraocular movements intact.      Conjunctiva/sclera: Conjunctivae normal.   Cardiovascular:      Rate and Rhythm: Normal rate and regular  rhythm.      Pulses: Normal pulses.      Heart sounds: Normal heart sounds. No murmur heard.     No friction rub. No gallop.   Pulmonary:      Effort: Pulmonary effort is normal. No respiratory distress.      Breath sounds: Normal breath sounds. No stridor. No wheezing, rhonchi or rales.   Chest:      Chest wall: No tenderness.   Abdominal:      General: Abdomen is flat. There is no distension.      Palpations: Abdomen is soft.      Tenderness: There is no abdominal tenderness. There is no guarding or rebound.   Musculoskeletal:      Right lower leg: No edema.      Left lower leg: No edema.   Skin:     General: Skin is warm.      Coloration: Skin is not jaundiced.      Findings: No erythema.   Neurological:      Mental Status: She is alert and oriented to person, place, and time.   Psychiatric:         Mood and Affect: Mood normal.         Behavior: Behavior normal.             Significant Labs: All pertinent labs within the past 24 hours have been reviewed.    Significant Imaging: I have reviewed all pertinent imaging results/findings within the past 24 hours.

## 2025-02-04 NOTE — ASSESSMENT & PLAN NOTE
Patient's blood pressure range in the last 24 hours was: BP  Min: 97/55  Max: 131/68.The patient's inpatient anti-hypertensive regimen is listed below:  Current Antihypertensives  amLODIPine tablet 10 mg, Daily, Oral    Plan  - BP is controlled, no changes needed to their regimen  - Adjust as needed

## 2025-02-04 NOTE — RESPIRATORY THERAPY
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    24 Hours Vitals Range:  Temp:  [98.2 °F (36.8 °C)-98.8 °F (37.1 °C)]   Pulse:  [69-81]   Resp:  [16-20]   BP: ()/(55-68)   SpO2:  [96 %-100 %]     Labs:    Recent Labs     25  1419 25  0257 25  0430    136 135*   K 3.7 3.8 3.8    105 107   CO2 19* 20* 22*   BUN 8 9 17   CREATININE 0.9 1.2 1.3   GLU 97 86 110   PHOS  --  3.7 4.3   MG  --  1.9 1.8        No results for input(s): "PH", "PCO2", "PO2", "HCO3", "POCSATURATED", "BE" in the last 72 hours.    ASSESSMENT/INTERVENTIONS    Pt resting comfortably with no respiratory needs at this time.     Last VS   Temp: 98.5 °F (36.9 °C) (1539)  Pulse: 81 (1539)  Resp: 20 (1539)  BP: 104/56 (1539)  SpO2: 96 % (1539)    Level of Consciousness: Level of Consciousness (AVPU): alert  Respiratory Effort:   Expansion/Accessory Muscle Usage:    All Lung Field Breath Sounds:    Is the ETCO2 monitor on? Yes  Is the patient wearing a cannula? Yes  Are ETCO2 orders placed? Yes  Is the patient on a PCA pump? Yes  ETCO2 monitored: ETCO2 (mmHg): 56 mmHg  Ambu at bedside: y    Active Orders   Respiratory Care    END TIDAL CO2 MONITOR Q12H     Frequency: Q12H     " Number of Occurrences: Until Specified    Incentive spirometry     Frequency: Q6H     Number of Occurrences: Until Specified    Oxygen Continuous     Frequency: Continuous     Number of Occurrences: Until Specified     Order Questions:      To maintain SpO2 goal of: >= 92%    Pulse Oximetry Continuous     Frequency: Continuous     Number of Occurrences: Until Specified       RECOMMENDATIONS    We recommend: RRT Recs: Continue POC per primary team.    FOLLOW-UP    Please call back the Rapid Response RT, Myles Anthony RRT at x [phone] for any questions or concerns.

## 2025-02-04 NOTE — PLAN OF CARE
Problem: Adult Inpatient Plan of Care  Goal: Plan of Care Review  Outcome: Progressing  Goal: Patient-Specific Goal (Individualized)  Outcome: Progressing  Goal: Absence of Hospital-Acquired Illness or Injury  Outcome: Progressing  Goal: Optimal Comfort and Wellbeing  Outcome: Progressing  Goal: Readiness for Transition of Care  Outcome: Progressing   She had a great day.   Pain is well controled.   No sign of distress noted at this time.   The bed is in the lowest position and the call light is within reach.

## 2025-02-04 NOTE — CONSULTS
RIGOBERTOS consulted for Midline insertion in real time using u/s guidance.     Indication: LONG TERM PVA  Gauge: 18  Location: RIGHT BASILIC  Length in cm: 10  Max dwell date: 3/5/2025  Lot #: [id number]    Image saved and uploaded to EMR

## 2025-02-04 NOTE — PROGRESS NOTES
[facility] Hospital Medicine  Progress Note    Patient Name: Nazanin Malone  MRN: [id number]  Patient Class: IP- Inpatient   Admission Date: 2/2/2025  Length of Stay: 1 days  Attending Physician: Paola Mckeon*  Primary Care Provider: No, Primary Doctor        Subjective     Principal Problem:Vaso-occlusive pain due to sickle cell disease        HPI:  Nazanin Malone is a 44-year-old female with sickle cell beta thalassemia zero, multiple pulmonary emboli on chronic AC, asthma, and HTN who presented to [facility] ED 2/2/25 for diffuse myalgias and pain consistent with a sickle cell pain crisis as well as low grade fever, nausea, vomiting, and diarrhea for the past 2 days. She states that she has been having issues with gastritis recently, with ongoing nausea, vomiting, and watery diarrhea that has been exacerbating her pain. She went to the ED on 1/29/25 with mild relief of pain and was discharged. Her pain continued to persist and she noted a subjective fever of 100.8 so she decided to present again to the ED. She was recently admitted to [facility] 1/7/25 - 1/25/25 for pain c/w sickle cell pain crises which was refractory to her home regimen of hydrea, folic acid oxy. Patient recently moved here from [place]. She was receiving chronic exchange transfusions there but is not currently receiving them as part of her therapy here. She is seeing a hematologist. Of note, patient requested administration of further dilaudid than ordered from nurses. Also asked if nursing needed help to throw away empty syringes and empty med syringe. Patient at recent admission was started on a PCA pump given difficulty weaning dilaudid and also found to have L cephalic vein and partial occlusive thrombus of LIJ.      In the ED, /108 HR 78 RR 20 sats adequate on room air. Tmax 100.9. Labs notable for Hgb 8.2. reticulocyte 1.4. UA was ordered. CXR negative for acute infectious process. Patient received 5 mg dilaudid without  resolution of pain, benadryl, and phenergan. Patient admitted to hospital medicine for further workup and management.    Overview/Hospital Course:  44 y.o F w/ SCD presenting in acute crisis. Suspect viral gastroenteritis and GI losses precipitated current event. Labs and vitals are reassuring. Infectious stool studies pending. Started on PCA pump with plans to wean and transition to orals when appropriate. Received 1u pRBC 2/4.    Interval History: DASH GASTON, will transfuse 1 u pRBC for symptomatic anemia. Will continue PCA pump as is, with plan to de-escalate tomorrow.    Review of Systems   Constitutional:  Positive for activity change. Negative for chills and fever.   HENT:  Negative for rhinorrhea.    Respiratory:  Negative for cough, shortness of breath and wheezing.    Cardiovascular:  Negative for chest pain, palpitations and leg swelling.   Gastrointestinal:  Positive for abdominal pain. Negative for abdominal distention, constipation, diarrhea, nausea and vomiting.   Genitourinary:  Negative for dysuria.   Musculoskeletal:  Positive for arthralgias.   Neurological:  Negative for weakness.   Psychiatric/Behavioral:  Negative for agitation and confusion.    All other systems reviewed and are negative.    Objective:     Vital Signs (Most Recent):  Temp: 98.7 °F (37.1 °C) (02/04/25 1204)  Pulse: 80 (02/04/25 1204)  Resp: 20 (02/04/25 1204)  BP: 131/68 (02/04/25 1204)  SpO2: 98 % (02/04/25 1204) Vital Signs (24h Range):  Temp:  [98.2 °F (36.8 °C)-98.8 °F (37.1 °C)] 98.7 °F (37.1 °C)  Pulse:  [69-80] 80  Resp:  [16-20] 20  SpO2:  [98 %-100 %] 98 %  BP: ()/(54-68) 131/68     Weight: 93.9 kg (207 lb)  Body mass index is 37.86 kg/m².    Intake/Output Summary (Last 24 hours) at 2/4/2025 1316  Last data filed at 2/4/2025 0653  Gross per 24 hour   Intake 1905 ml   Output --   Net 1905 ml         Physical Exam  Vitals and nursing note reviewed.   Constitutional:       General: She is not in acute distress.      Appearance: Normal appearance. She is not ill-appearing.   HENT:      Head: Normocephalic and atraumatic.      Nose: No congestion or rhinorrhea.      Mouth/Throat:      Pharynx: Oropharynx is clear. No oropharyngeal exudate.   Eyes:      General: No scleral icterus.     Extraocular Movements: Extraocular movements intact.      Conjunctiva/sclera: Conjunctivae normal.   Cardiovascular:      Rate and Rhythm: Normal rate and regular rhythm.      Pulses: Normal pulses.      Heart sounds: Normal heart sounds. No murmur heard.     No friction rub. No gallop.   Pulmonary:      Effort: Pulmonary effort is normal. No respiratory distress.      Breath sounds: Normal breath sounds. No stridor. No wheezing, rhonchi or rales.   Chest:      Chest wall: No tenderness.   Abdominal:      General: Abdomen is flat. There is no distension.      Palpations: Abdomen is soft.      Tenderness: There is no abdominal tenderness. There is no guarding or rebound.   Musculoskeletal:      Right lower leg: No edema.      Left lower leg: No edema.   Skin:     General: Skin is warm.      Coloration: Skin is not jaundiced.      Findings: No erythema.   Neurological:      Mental Status: She is alert and oriented to person, place, and time.   Psychiatric:         Mood and Affect: Mood normal.         Behavior: Behavior normal.             Significant Labs: All pertinent labs within the past 24 hours have been reviewed.    Significant Imaging: I have reviewed all pertinent imaging results/findings within the past 24 hours.      Assessment and Plan     * Vaso-occlusive pain due to sickle cell disease  44 y.o F with pmhx sickle cell beta thalassemia zero, multiple pulmonary emboli on chronic AC, asthma, and HTN who presented to [facility] ED for diffuse myalgias and pain consistent with a sickle cell pain crisis as well as reported fever (100.8), nausea, vomiting, and diarrhea for the past day. She was recently admitted to [facility] 1/7-1/25 for a similar pain  crisis. CXR without focal consolidation, on RA and denying SOB, cough, chest pain. No concern for acute chest on admission.    - Multimodal pain regimen scheduled tylenol, tizanidine  - Dilaudid PCA   - Folic acid, hydroxyurea      Chronic prescription opiate use  - See Vaso-occlusive pain    Hx pulmonary embolism  Multiple emboli noted in past.    -Continue home anticoagulation    Chronic gastritis  Patient with history of gastritis that is chronic and stable. Associated with chronic diarrhea. Notes recent episodes of vomiting and diarrhea prior to admission. Will give fluids given c/f dehydration and continue home meds  Diarrhea resolved    - Continue home PPI and carafate    Obesity (BMI 30-39.9)  Body mass index is 37.86 kg/m². Morbid obesity complicates all aspects of disease management from diagnostic modalities to treatment. Weight loss encouraged and health benefits explained to patient.         Hypertension  Patient's blood pressure range in the last 24 hours was: BP  Min: 97/55  Max: 131/68.The patient's inpatient anti-hypertensive regimen is listed below:  Current Antihypertensives  amLODIPine tablet 10 mg, Daily, Oral    Plan  - BP is controlled, no changes needed to their regimen  - Adjust as needed      VTE Risk Mitigation (From admission, onward)           Ordered     apixaban tablet 5 mg  2 times daily         02/02/25 1920                    Discharge Planning   ALYSE: 2/6/2025     Code Status: DNR   Medical Readiness for Discharge Date:   Discharge Plan A: Home with family                Bubba Hensley MD  Department of Hospital Medicine   [facility] - Surgery

## 2025-02-05 ENCOUNTER — TELEPHONE (OUTPATIENT)
Dept: HEMATOLOGY/ONCOLOGY | Facility: CLINIC | Age: 45
End: 2025-02-05
Payer: MEDICARE

## 2025-02-05 LAB
ALBUMIN SERPL BCP-MCNC: 3.2 G/DL (ref 3.5–5.2)
ALP SERPL-CCNC: 72 U/L (ref 40–150)
ALT SERPL W/O P-5'-P-CCNC: 7 U/L (ref 10–44)
ANION GAP SERPL CALC-SCNC: 5 MMOL/L (ref 8–16)
ANISOCYTOSIS BLD QL SMEAR: SLIGHT
AST SERPL-CCNC: 14 U/L (ref 10–40)
BASOPHILS # BLD AUTO: 0.05 K/UL (ref 0–0.2)
BASOPHILS NFR BLD: 0.8 % (ref 0–1.9)
BILIRUB SERPL-MCNC: 0.8 MG/DL (ref 0.1–1)
BUN SERPL-MCNC: 17 MG/DL (ref 6–20)
CALCIUM SERPL-MCNC: 8.7 MG/DL (ref 8.7–10.5)
CHLORIDE SERPL-SCNC: 109 MMOL/L (ref 95–110)
CO2 SERPL-SCNC: 23 MMOL/L (ref 23–29)
CREAT SERPL-MCNC: 1.1 MG/DL (ref 0.5–1.4)
DIFFERENTIAL METHOD BLD: ABNORMAL
EOSINOPHIL # BLD AUTO: 0.4 K/UL (ref 0–0.5)
EOSINOPHIL NFR BLD: 6.1 % (ref 0–8)
ERYTHROCYTE [DISTWIDTH] IN BLOOD BY AUTOMATED COUNT: 23.9 % (ref 11.5–14.5)
EST. GFR  (NO RACE VARIABLE): >60 ML/MIN/1.73 M^2
GLUCOSE SERPL-MCNC: 90 MG/DL (ref 70–110)
HCT VFR BLD AUTO: 25.1 % (ref 37–48.5)
HGB BLD-MCNC: 7.9 G/DL (ref 12–16)
HYPOCHROMIA BLD QL SMEAR: ABNORMAL
IMM GRANULOCYTES # BLD AUTO: 0 K/UL (ref 0–0.04)
IMM GRANULOCYTES NFR BLD AUTO: 0 % (ref 0–0.5)
LYMPHOCYTES # BLD AUTO: 3.7 K/UL (ref 1–4.8)
LYMPHOCYTES NFR BLD: 62.3 % (ref 18–48)
MAGNESIUM SERPL-MCNC: 1.8 MG/DL (ref 1.6–2.6)
MCH RBC QN AUTO: 22 PG (ref 27–31)
MCHC RBC AUTO-ENTMCNC: 31.5 G/DL (ref 32–36)
MCV RBC AUTO: 70 FL (ref 82–98)
MONOCYTES # BLD AUTO: 0.9 K/UL (ref 0.3–1)
MONOCYTES NFR BLD: 15.3 % (ref 4–15)
NEUTROPHILS # BLD AUTO: 0.9 K/UL (ref 1.8–7.7)
NEUTROPHILS NFR BLD: 15.5 % (ref 38–73)
NRBC BLD-RTO: 3 /100 WBC
OVALOCYTES BLD QL SMEAR: ABNORMAL
PHOSPHATE SERPL-MCNC: 3.5 MG/DL (ref 2.7–4.5)
PLATELET # BLD AUTO: 176 K/UL (ref 150–450)
PMV BLD AUTO: 8.9 FL (ref 9.2–12.9)
POIKILOCYTOSIS BLD QL SMEAR: SLIGHT
POLYCHROMASIA BLD QL SMEAR: ABNORMAL
POTASSIUM SERPL-SCNC: 4.3 MMOL/L (ref 3.5–5.1)
PROT SERPL-MCNC: 6.7 G/DL (ref 6–8.4)
RBC # BLD AUTO: 3.59 M/UL (ref 4–5.4)
SODIUM SERPL-SCNC: 137 MMOL/L (ref 136–145)
SPHEROCYTES BLD QL SMEAR: ABNORMAL
TARGETS BLD QL SMEAR: ABNORMAL
WBC # BLD AUTO: 5.89 K/UL (ref 3.9–12.7)

## 2025-02-05 PROCEDURE — 80053 COMPREHEN METABOLIC PANEL: CPT

## 2025-02-05 PROCEDURE — 83735 ASSAY OF MAGNESIUM: CPT

## 2025-02-05 PROCEDURE — 36415 COLL VENOUS BLD VENIPUNCTURE: CPT

## 2025-02-05 PROCEDURE — 63600175 PHARM REV CODE 636 W HCPCS: Mod: JZ,TB | Performed by: HOSPITALIST

## 2025-02-05 PROCEDURE — 99900035 HC TECH TIME PER 15 MIN (STAT)

## 2025-02-05 PROCEDURE — 11000001 HC ACUTE MED/SURG PRIVATE ROOM

## 2025-02-05 PROCEDURE — 84100 ASSAY OF PHOSPHORUS: CPT

## 2025-02-05 PROCEDURE — 25000003 PHARM REV CODE 250

## 2025-02-05 PROCEDURE — 85025 COMPLETE CBC W/AUTO DIFF WBC: CPT

## 2025-02-05 RX ORDER — HYDROMORPHONE HYDROCHLORIDE 2 MG/ML
2 INJECTION, SOLUTION INTRAMUSCULAR; INTRAVENOUS; SUBCUTANEOUS EVERY 4 HOURS PRN
Status: DISCONTINUED | OUTPATIENT
Start: 2025-02-05 | End: 2025-02-06 | Stop reason: HOSPADM

## 2025-02-05 RX ORDER — SODIUM CHLORIDE 450 MG/100ML
INJECTION, SOLUTION INTRAVENOUS CONTINUOUS
Status: ACTIVE | OUTPATIENT
Start: 2025-02-05 | End: 2025-02-06

## 2025-02-05 RX ORDER — HYDROCODONE BITARTRATE AND ACETAMINOPHEN 10; 325 MG/1; MG/1
1 TABLET ORAL EVERY 6 HOURS PRN
Status: DISCONTINUED | OUTPATIENT
Start: 2025-02-05 | End: 2025-02-06 | Stop reason: HOSPADM

## 2025-02-05 RX ORDER — MORPHINE SULFATE 15 MG/1
30 TABLET, FILM COATED, EXTENDED RELEASE ORAL EVERY 12 HOURS
Status: DISCONTINUED | OUTPATIENT
Start: 2025-02-05 | End: 2025-02-06 | Stop reason: HOSPADM

## 2025-02-05 RX ADMIN — SUCRALFATE 1 G: 1 TABLET ORAL at 10:02

## 2025-02-05 RX ADMIN — DIPHENHYDRAMINE HYDROCHLORIDE 25 MG: 25 CAPSULE ORAL at 04:02

## 2025-02-05 RX ADMIN — ACETAMINOPHEN 1000 MG: 500 TABLET ORAL at 02:02

## 2025-02-05 RX ADMIN — MORPHINE SULFATE 30 MG: 15 TABLET, FILM COATED, EXTENDED RELEASE ORAL at 12:02

## 2025-02-05 RX ADMIN — Medication: at 07:02

## 2025-02-05 RX ADMIN — APIXABAN 5 MG: 5 TABLET, FILM COATED ORAL at 08:02

## 2025-02-05 RX ADMIN — FLUOXETINE HYDROCHLORIDE 40 MG: 20 CAPSULE ORAL at 08:02

## 2025-02-05 RX ADMIN — TIZANIDINE 4 MG: 4 TABLET ORAL at 01:02

## 2025-02-05 RX ADMIN — GABAPENTIN 600 MG: 300 CAPSULE ORAL at 09:02

## 2025-02-05 RX ADMIN — SUCRALFATE 1 G: 1 TABLET ORAL at 04:02

## 2025-02-05 RX ADMIN — ACETAMINOPHEN 1000 MG: 500 TABLET ORAL at 09:02

## 2025-02-05 RX ADMIN — GABAPENTIN 600 MG: 300 CAPSULE ORAL at 02:02

## 2025-02-05 RX ADMIN — TIZANIDINE 4 MG: 4 TABLET ORAL at 05:02

## 2025-02-05 RX ADMIN — AMLODIPINE BESYLATE 10 MG: 5 TABLET ORAL at 08:02

## 2025-02-05 RX ADMIN — SUCRALFATE 1 G: 1 TABLET ORAL at 05:02

## 2025-02-05 RX ADMIN — HYDROMORPHONE HYDROCHLORIDE 2 MG: 2 INJECTION INTRAMUSCULAR; INTRAVENOUS; SUBCUTANEOUS at 09:02

## 2025-02-05 RX ADMIN — APIXABAN 5 MG: 5 TABLET, FILM COATED ORAL at 09:02

## 2025-02-05 RX ADMIN — PANTOPRAZOLE SODIUM 40 MG: 40 TABLET, DELAYED RELEASE ORAL at 08:02

## 2025-02-05 RX ADMIN — SODIUM CHLORIDE: 4.5 INJECTION, SOLUTION INTRAVENOUS at 01:02

## 2025-02-05 RX ADMIN — FOLIC ACID 1 MG: 1 TABLET ORAL at 08:02

## 2025-02-05 RX ADMIN — SUCRALFATE 1 G: 1 TABLET ORAL at 09:02

## 2025-02-05 RX ADMIN — ACETAMINOPHEN 1000 MG: 500 TABLET ORAL at 08:02

## 2025-02-05 RX ADMIN — HYDROMORPHONE HYDROCHLORIDE 2 MG: 2 INJECTION INTRAMUSCULAR; INTRAVENOUS; SUBCUTANEOUS at 01:02

## 2025-02-05 RX ADMIN — HYDROXYUREA 1000 MG: 500 CAPSULE ORAL at 08:02

## 2025-02-05 RX ADMIN — TIZANIDINE 4 MG: 4 TABLET ORAL at 09:02

## 2025-02-05 RX ADMIN — GABAPENTIN 600 MG: 300 CAPSULE ORAL at 08:02

## 2025-02-05 NOTE — ASSESSMENT & PLAN NOTE
Patient's blood pressure range in the last 24 hours was: BP  Min: 97/52  Max: 129/81.The patient's inpatient anti-hypertensive regimen is listed below:  Current Antihypertensives  amLODIPine tablet 10 mg, Daily, Oral    Plan  - BP is controlled, no changes needed to their regimen  - Adjust as needed

## 2025-02-05 NOTE — PLAN OF CARE
Problem: Adult Inpatient Plan of Care  Goal: Plan of Care Review  2/5/2025 1734 by Luis Manuel Mathis RN  Outcome: Progressing  2/5/2025 1734 by Luis Manuel Mathis RN  Outcome: Progressing  Goal: Patient-Specific Goal (Individualized)  2/5/2025 1734 by Luis Manuel Mathis RN  Outcome: Progressing  2/5/2025 1734 by Luis Manuel Mathis RN  Outcome: Progressing  Goal: Absence of Hospital-Acquired Illness or Injury  2/5/2025 1734 by Luis Manuel Mathis RN  Outcome: Progressing  2/5/2025 1734 by Luis Manuel Mathis RN  Outcome: Progressing  Goal: Optimal Comfort and Wellbeing  2/5/2025 1734 by Luis Manuel Mathis RN  Outcome: Progressing  2/5/2025 1734 by Luis Manuel Mathis RN  Outcome: Progressing  Goal: Readiness for Transition of Care  2/5/2025 1734 by Luis Manuel Mathis RN  Outcome: Progressing  2/5/2025 1734 by Luis Manuel Mathis RN  Outcome: Progressing     Problem: Infection  Goal: Absence of Infection Signs and Symptoms  2/5/2025 1734 by Luis Manuel Mathis RN  Outcome: Progressing  2/5/2025 1734 by Luis Manuel Mathis RN  Outcome: Progressing     Pt doing well in the Bed, pt is now taking her home medications for pain, pt is able to get up from the bed on her own and toilet independently, POC reviewed with pt, she has no questions or concerns at this time.

## 2025-02-05 NOTE — PROGRESS NOTES
[facility] Medicine  Progress Note    Patient Name: Nazanin Malone  MRN: [id number]  Patient Class: IP- Inpatient   Admission Date: 2/2/2025  Length of Stay: 2 days  Attending Physician: Paola Mckeon*  Primary Care Provider: No, Primary Doctor        Subjective     Principal Problem:Vaso-occlusive pain due to sickle cell disease        HPI:  Nazanin Malone is a 44-year-old female with sickle cell beta thalassemia zero, multiple pulmonary emboli on chronic AC, asthma, and HTN who [place] [facility] [place]25 for diffuse myalgias and pain consistent with a sickle cell pain crisis as well as low grade fever, nausea, vomiting, and diarrhea for the past 2 days. She states that she has been having issues with gastritis recently, with ongoing nausea, vomiting, and watery diarrhea that has been exacerbating her pain. She went to the ED on 1/29/25 with mild relief of pain and was discharged. Her pain continued to persist and she noted a subjective fever of 100.8 so she decided to present again to the ED. She was recently [place] [facility] [place] 1/25/25 for pain c/w sickle cell pain crises which was refractory to her home regimen of hydrea, folic acid oxy. Patient recently moved here from [place]. She was receiving chronic exchange transfusions there but is not currently receiving them as part of her therapy here. She is seeing a hematologist. Of note, patient requested administration of further dilaudid than ordered from nurses. Also asked if nursing needed help to throw away empty syringes and empty med syringe. Patient at recent admission was started on a PCA pump given difficulty weaning dilaudid and also found to have L cephalic vein and partial occlusive thrombus of LIJ.      In the ED, /108 HR 78 RR 20 sats adequate on room air. Tmax 100.9. Labs notable for Hgb 8.2. reticulocyte 1.4. UA was ordered. CXR negative for acute infectious process. Patient received 5 mg dilaudid without  resolution of pain, benadryl, and phenergan. Patient admitted to hospital medicine for further workup and management.    Overview/Hospital Course:  44 y.o F w/ SCD presenting in acute crisis. Suspect viral gastroenteritis and GI losses precipitated current event. Labs and vitals are reassuring. Infectious stool studies pending. Started on PCA pump with plans to wean and transition to orals when appropriate. Received 1u pRBC 2/4.    Interval History: NAEON and VSS. Patient doing well this AM and without complaints. Pain feeling improved and willing to trial home medications and be taken off PCA    Review of Systems   Constitutional:  Positive for activity change. Negative for chills and fever.   HENT:  Negative for rhinorrhea.    Respiratory:  Negative for cough, shortness of breath and wheezing.    Cardiovascular:  Negative for chest pain, palpitations and leg swelling.   Gastrointestinal:  Positive for abdominal pain. Negative for abdominal distention, constipation, diarrhea, nausea and vomiting.   Genitourinary:  Negative for dysuria.   Musculoskeletal:  Positive for arthralgias.   Neurological:  Negative for weakness.   Psychiatric/Behavioral:  Negative for agitation and confusion.    All other systems reviewed and are negative.    Objective:     Vital Signs (Most Recent):  Temp: 98.4 °F (36.9 °C) (02/05/25 0458)  Pulse: 66 (02/05/25 0458)  Resp: 18 (02/05/25 0458)  BP: (!) 108/58 (02/05/25 0458)  SpO2: 100 % (02/05/25 0458) Vital Signs (24h Range):  Temp:  [98.2 °F (36.8 °C)-98.9 °F (37.2 °C)] 98.4 °F (36.9 °C)  Pulse:  [66-81] 66  Resp:  [16-20] 18  SpO2:  [96 %-100 %] 100 %  BP: ()/(52-81) 108/58     Weight: 93.9 kg (207 lb)  Body mass index is 37.86 kg/m².    Intake/Output Summary (Last 24 hours) at 2/5/2025 0687  Last data filed at 2/4/2025 0653  Gross per 24 hour   Intake 1585 ml   Output --   Net 1585 ml         Physical Exam  Vitals and nursing note reviewed.   Constitutional:       General: She is  not in acute distress.     Appearance: Normal appearance. She is not ill-appearing.   HENT:      Head: Normocephalic and atraumatic.      Nose: No congestion or rhinorrhea.      Mouth/Throat:      Pharynx: Oropharynx is clear. No oropharyngeal exudate.   Eyes:      General: No scleral icterus.     Extraocular Movements: Extraocular movements intact.      Conjunctiva/sclera: Conjunctivae normal.   Cardiovascular:      Rate and Rhythm: Normal rate and regular rhythm.      Pulses: Normal pulses.      Heart sounds: Normal heart sounds. No murmur heard.     No friction rub. No gallop.   Pulmonary:      Effort: Pulmonary effort is normal. No respiratory distress.      Breath sounds: Normal breath sounds. No stridor. No wheezing, rhonchi or rales.   Chest:      Chest wall: No tenderness.   Abdominal:      General: Abdomen is flat. There is no distension.      Palpations: Abdomen is soft.      Tenderness: There is no abdominal tenderness. There is no guarding or rebound.   Musculoskeletal:      Right lower leg: No edema.      Left lower leg: No edema.   Skin:     General: Skin is warm.      Coloration: Skin is not jaundiced.      Findings: No erythema.   Neurological:      Mental Status: She is alert and oriented to person, place, and time.   Psychiatric:         Mood and Affect: Mood normal.         Behavior: Behavior normal.             Significant Labs: All pertinent labs within the past 24 hours have been reviewed.    Significant Imaging: I have reviewed all pertinent imaging results/findings within the past 24 hours.    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 She was recently [place] [facility] [place]  for a similar pain crisis. CXR without focal consolidation, on RA and denying SOB, cough, chest pain. No concern for acute chest on admission.    - Multimodal pain regimen scheduled tylenol, tizanidine  - Wean off PCA pump 2/5  - Restart home ms contin, start norco  - Folic acid, hydroxyurea      Chronic prescription opiate use  - See Vaso-occlusive pain    Hx pulmonary embolism  Multiple emboli noted in past.    -Continue home anticoagulation    Chronic gastritis  Patient with history of gastritis that is chronic and stable. Associated with chronic diarrhea. Notes recent episodes of vomiting and diarrhea prior to admission. Will give fluids given c/f dehydration and continue home meds  Diarrhea resolved    - Continue home PPI and carafate    Obesity (BMI 30-39.9)  Body mass index is 37.86 kg/m². Morbid obesity complicates all aspects of disease management from diagnostic modalities to treatment. Weight loss encouraged and health benefits explained to patient.         Hypertension  Patient's blood pressure range in the last 24 hours was: BP  Min: 97/52  Max: 129/81.The patient's inpatient anti-hypertensive regimen is listed below:  Current Antihypertensives  amLODIPine tablet 10 mg, Daily, Oral    Plan  - BP is controlled, no changes needed to their regimen  - Adjust as needed      VTE Risk Mitigation (From admission, onward)           Ordered     apixaban tablet 5 mg  2 times daily         02/02/25 1920                    Discharge Planning   ALYSE: 2/6/2025     Code Status: DNR   Medical Readiness for Discharge Date:   Discharge Plan A: Home with family                        Pee Smith MD  Internal Medicine, PGY-1  Department of [facility] Medicine   [facility] - Surgery

## 2025-02-05 NOTE — SUBJECTIVE & OBJECTIVE
Interval History: NAEON and VSS. Patient doing well this AM and without complaints. Pain feeling improved and willing to trial home medications and be taken off PCA    Review of Systems   Constitutional:  Positive for activity change. Negative for chills and fever.   HENT:  Negative for rhinorrhea.    Respiratory:  Negative for cough, shortness of breath and wheezing.    Cardiovascular:  Negative for chest pain, palpitations and leg swelling.   Gastrointestinal:  Positive for abdominal pain. Negative for abdominal distention, constipation, diarrhea, nausea and vomiting.   Genitourinary:  Negative for dysuria.   Musculoskeletal:  Positive for arthralgias.   Neurological:  Negative for weakness.   Psychiatric/Behavioral:  Negative for agitation and confusion.    All other systems reviewed and are negative.    Objective:     Vital Signs (Most Recent):  Temp: 98.4 °F (36.9 °C) (02/05/25 0458)  Pulse: 66 (02/05/25 0458)  Resp: 18 (02/05/25 0458)  BP: (!) 108/58 (02/05/25 0458)  SpO2: 100 % (02/05/25 0458) Vital Signs (24h Range):  Temp:  [98.2 °F (36.8 °C)-98.9 °F (37.2 °C)] 98.4 °F (36.9 °C)  Pulse:  [66-81] 66  Resp:  [16-20] 18  SpO2:  [96 %-100 %] 100 %  BP: ()/(52-81) 108/58     Weight: 93.9 kg (207 lb)  Body mass index is 37.86 kg/m².    Intake/Output Summary (Last 24 hours) at 2/5/2025 0644  Last data filed at 2/4/2025 0653  Gross per 24 hour   Intake 1585 ml   Output --   Net 1585 ml         Physical Exam  Vitals and nursing note reviewed.   Constitutional:       General: She is not in acute distress.     Appearance: Normal appearance. She is not ill-appearing.   HENT:      Head: Normocephalic and atraumatic.      Nose: No congestion or rhinorrhea.      Mouth/Throat:      Pharynx: Oropharynx is clear. No oropharyngeal exudate.   Eyes:      General: No scleral icterus.     Extraocular Movements: Extraocular movements intact.      Conjunctiva/sclera: Conjunctivae normal.   Cardiovascular:      Rate and  Rhythm: Normal rate and regular rhythm.      Pulses: Normal pulses.      Heart sounds: Normal heart sounds. No murmur heard.     No friction rub. No gallop.   Pulmonary:      Effort: Pulmonary effort is normal. No respiratory distress.      Breath sounds: Normal breath sounds. No stridor. No wheezing, rhonchi or rales.   Chest:      Chest wall: No tenderness.   Abdominal:      General: Abdomen is flat. There is no distension.      Palpations: Abdomen is soft.      Tenderness: There is no abdominal tenderness. There is no guarding or rebound.   Musculoskeletal:      Right lower leg: No edema.      Left lower leg: No edema.   Skin:     General: Skin is warm.      Coloration: Skin is not jaundiced.      Findings: No erythema.   Neurological:      Mental Status: She is alert and oriented to person, place, and time.   Psychiatric:         Mood and Affect: Mood normal.         Behavior: Behavior normal.             Significant Labs: All pertinent labs within the past 24 hours have been reviewed.    Significant Imaging: I have reviewed all pertinent imaging results/findings within the past 24 hours.

## 2025-02-05 NOTE — NURSING
Secure chat sent to attending, Dr. Mckeon as well as OC and charge nurse on the unit, patient has a order that needs ordered clarification.  Dilaudid pca, her order is for a patient demand of 0.4mg/15min, with a lockout of 2mg, it should be 1.6mg lockout with a demand of 0.4mg, the pump corrects it to patient getting 0.5mg/15min to equal a 2mg lockout.    Day shift nurse aware to follow up with clarification

## 2025-02-05 NOTE — ASSESSMENT & PLAN NOTE
44 y.o F with pmhx sickle cell beta thalassemia zero, multiple pulmonary emboli on chronic AC, asthma, and HTN who presented to [facility] ED for diffuse myalgias and pain consistent with a sickle cell pain crisis as well as reported fever (100.8), nausea, vomiting, and diarrhea for the past day. She was recently admitted to [facility] 1/7-1/25 for a similar pain crisis. CXR without focal consolidation, on RA and denying SOB, cough, chest pain. No concern for acute chest on admission.    - Multimodal pain regimen scheduled tylenol, tizanidine  - Wean off PCA pump 2/5  - Restart home ms contin, start norco  - Folic acid, hydroxyurea

## 2025-02-05 NOTE — TELEPHONE ENCOUNTER
"Spoke pt via phone. 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 Pt states she feeld like Dr. Clark does not want to treat her and send in her medications. Pt states she read a note from a nurse while she was in the hospital saying she was requesting more pain medication than prescribed but explained that she was really asking for benadryl with her pca pump because the pain medication was making her itch. Reassured pt that it is not that Dr. Clark does not want to treat her but he cannot send in prescriptions for pain medication to her pharmacy while she is currently in the hospital. Pt instructed to send portal message tomorrow to our office letting us know when she is getting discharged so that we can review her chart, see what medications the inpatient team send in to her pharmacy and then speak to Dr. Clark if neccasry about sending in refills of her pain medication if the inpatient team did not prescribed any discharge medications to hold her over to her follow-up appt with Dr Clark on 2/11. Pt verbalized understanding and asked if she could ask the inpatient team not to send in any discharge pain medications and just speak to our office tomorrow when she gets discharged about sending in refills. Reassured patient that will be fine and we will keep an eye out for her call/portal message tomorrow.   "

## 2025-02-05 NOTE — TELEPHONE ENCOUNTER
----- Message from Argenis sent at 2/5/2025  1:12 PM CST -----  Type:  Needs Medical Advice    Who Called: Trushanda   Symptoms (please be specific):    How long has patient had these symptoms:    Pharmacy name and phone #:    Would the patient rather a call back or a response via [facility]? call  Best Call Back Number: [phone]  Additional Information: Patient need to speak with a nurse regarding the changes in her pain medication

## 2025-02-06 ENCOUNTER — TELEPHONE (OUTPATIENT)
Dept: HEMATOLOGY/ONCOLOGY | Facility: CLINIC | Age: 45
End: 2025-02-06
Payer: MEDICARE

## 2025-02-06 VITALS
HEIGHT: 62 IN | HEART RATE: 86 BPM | DIASTOLIC BLOOD PRESSURE: 65 MMHG | SYSTOLIC BLOOD PRESSURE: 122 MMHG | TEMPERATURE: 99 F | WEIGHT: 207 LBS | BODY MASS INDEX: 38.09 KG/M2 | RESPIRATION RATE: 20 BRPM | OXYGEN SATURATION: 98 %

## 2025-02-06 LAB
ALBUMIN SERPL BCP-MCNC: 3.3 G/DL (ref 3.5–5.2)
ALP SERPL-CCNC: 80 U/L (ref 40–150)
ALT SERPL W/O P-5'-P-CCNC: 9 U/L (ref 10–44)
ANION GAP SERPL CALC-SCNC: 7 MMOL/L (ref 8–16)
AST SERPL-CCNC: 16 U/L (ref 10–40)
BASOPHILS # BLD AUTO: 0.08 K/UL (ref 0–0.2)
BASOPHILS NFR BLD: 1.1 % (ref 0–1.9)
BILIRUB SERPL-MCNC: 0.4 MG/DL (ref 0.1–1)
BUN SERPL-MCNC: 16 MG/DL (ref 6–20)
CALCIUM SERPL-MCNC: 9.2 MG/DL (ref 8.7–10.5)
CHLORIDE SERPL-SCNC: 110 MMOL/L (ref 95–110)
CO2 SERPL-SCNC: 26 MMOL/L (ref 23–29)
CREAT SERPL-MCNC: 0.9 MG/DL (ref 0.5–1.4)
DIFFERENTIAL METHOD BLD: ABNORMAL
EOSINOPHIL # BLD AUTO: 0.4 K/UL (ref 0–0.5)
EOSINOPHIL NFR BLD: 4.8 % (ref 0–8)
ERYTHROCYTE [DISTWIDTH] IN BLOOD BY AUTOMATED COUNT: 24.8 % (ref 11.5–14.5)
EST. GFR  (NO RACE VARIABLE): >60 ML/MIN/1.73 M^2
GLUCOSE SERPL-MCNC: 85 MG/DL (ref 70–110)
HCT VFR BLD AUTO: 26 % (ref 37–48.5)
HGB BLD-MCNC: 8.6 G/DL (ref 12–16)
IMM GRANULOCYTES # BLD AUTO: 0.01 K/UL (ref 0–0.04)
IMM GRANULOCYTES NFR BLD AUTO: 0.1 % (ref 0–0.5)
LYMPHOCYTES # BLD AUTO: 3.7 K/UL (ref 1–4.8)
LYMPHOCYTES NFR BLD: 50.7 % (ref 18–48)
MAGNESIUM SERPL-MCNC: 1.7 MG/DL (ref 1.6–2.6)
MCH RBC QN AUTO: 22.9 PG (ref 27–31)
MCHC RBC AUTO-ENTMCNC: 33.1 G/DL (ref 32–36)
MCV RBC AUTO: 69 FL (ref 82–98)
MONOCYTES # BLD AUTO: 1 K/UL (ref 0.3–1)
MONOCYTES NFR BLD: 13.6 % (ref 4–15)
NEUTROPHILS # BLD AUTO: 2.2 K/UL (ref 1.8–7.7)
NEUTROPHILS NFR BLD: 29.7 % (ref 38–73)
NRBC BLD-RTO: 3 /100 WBC
PHOSPHATE SERPL-MCNC: 1.6 MG/DL (ref 2.7–4.5)
PLATELET # BLD AUTO: 142 K/UL (ref 150–450)
PMV BLD AUTO: 9.1 FL (ref 9.2–12.9)
POTASSIUM SERPL-SCNC: 3.8 MMOL/L (ref 3.5–5.1)
PROT SERPL-MCNC: 6.9 G/DL (ref 6–8.4)
RBC # BLD AUTO: 3.75 M/UL (ref 4–5.4)
SODIUM SERPL-SCNC: 143 MMOL/L (ref 136–145)
WBC # BLD AUTO: 7.35 K/UL (ref 3.9–12.7)

## 2025-02-06 PROCEDURE — 63600175 PHARM REV CODE 636 W HCPCS: Mod: JZ,TB | Performed by: HOSPITALIST

## 2025-02-06 PROCEDURE — 85025 COMPLETE CBC W/AUTO DIFF WBC: CPT

## 2025-02-06 PROCEDURE — 36415 COLL VENOUS BLD VENIPUNCTURE: CPT

## 2025-02-06 PROCEDURE — 83735 ASSAY OF MAGNESIUM: CPT

## 2025-02-06 PROCEDURE — 25000003 PHARM REV CODE 250

## 2025-02-06 PROCEDURE — 84100 ASSAY OF PHOSPHORUS: CPT

## 2025-02-06 PROCEDURE — 80053 COMPREHEN METABOLIC PANEL: CPT

## 2025-02-06 PROCEDURE — 25000003 PHARM REV CODE 250: Performed by: HOSPITALIST

## 2025-02-06 RX ORDER — MORPHINE SULFATE 30 MG/1
30 TABLET, FILM COATED, EXTENDED RELEASE ORAL EVERY 12 HOURS
Qty: 14 TABLET | Refills: 0 | Status: SHIPPED | OUTPATIENT
Start: 2025-02-06 | End: 2025-02-11

## 2025-02-06 RX ORDER — FOLIC ACID 1 MG/1
1 TABLET ORAL DAILY
Qty: 30 TABLET | Refills: 5 | Status: SHIPPED | OUTPATIENT
Start: 2025-02-06 | End: 2025-08-05

## 2025-02-06 RX ORDER — ACETAMINOPHEN 325 MG/1
650 TABLET ORAL EVERY 8 HOURS PRN
COMMUNITY
Start: 2025-02-06

## 2025-02-06 RX ORDER — HYDROCODONE BITARTRATE AND ACETAMINOPHEN 10; 325 MG/1; MG/1
1 TABLET ORAL EVERY 6 HOURS PRN
Qty: 28 TABLET | Refills: 0 | Status: SHIPPED | OUTPATIENT
Start: 2025-02-06 | End: 2025-02-11 | Stop reason: SDUPTHER

## 2025-02-06 RX ORDER — SODIUM,POTASSIUM PHOSPHATES 280-250MG
2 POWDER IN PACKET (EA) ORAL EVERY 4 HOURS
Status: DISCONTINUED | OUTPATIENT
Start: 2025-02-06 | End: 2025-02-06 | Stop reason: HOSPADM

## 2025-02-06 RX ORDER — TIZANIDINE 4 MG/1
4 TABLET ORAL EVERY 8 HOURS
Qty: 30 TABLET | Refills: 0 | Status: SHIPPED | OUTPATIENT
Start: 2025-02-06 | End: 2025-02-19

## 2025-02-06 RX ADMIN — HYDROMORPHONE HYDROCHLORIDE 2 MG: 2 INJECTION INTRAMUSCULAR; INTRAVENOUS; SUBCUTANEOUS at 10:02

## 2025-02-06 RX ADMIN — SUCRALFATE 1 G: 1 TABLET ORAL at 09:02

## 2025-02-06 RX ADMIN — FOLIC ACID 1 MG: 1 TABLET ORAL at 09:02

## 2025-02-06 RX ADMIN — ACETAMINOPHEN 1000 MG: 500 TABLET ORAL at 09:02

## 2025-02-06 RX ADMIN — ACETAMINOPHEN 1000 MG: 500 TABLET ORAL at 02:02

## 2025-02-06 RX ADMIN — PANTOPRAZOLE SODIUM 40 MG: 40 TABLET, DELAYED RELEASE ORAL at 09:02

## 2025-02-06 RX ADMIN — AMLODIPINE BESYLATE 10 MG: 5 TABLET ORAL at 09:02

## 2025-02-06 RX ADMIN — TIZANIDINE 4 MG: 4 TABLET ORAL at 02:02

## 2025-02-06 RX ADMIN — MORPHINE SULFATE 30 MG: 15 TABLET, FILM COATED, EXTENDED RELEASE ORAL at 12:02

## 2025-02-06 RX ADMIN — TIZANIDINE 4 MG: 4 TABLET ORAL at 05:02

## 2025-02-06 RX ADMIN — HYDROMORPHONE HYDROCHLORIDE 2 MG: 2 INJECTION INTRAMUSCULAR; INTRAVENOUS; SUBCUTANEOUS at 02:02

## 2025-02-06 RX ADMIN — HYDROCODONE BITARTRATE AND ACETAMINOPHEN 1 TABLET: 10; 325 TABLET ORAL at 05:02

## 2025-02-06 RX ADMIN — GABAPENTIN 600 MG: 300 CAPSULE ORAL at 02:02

## 2025-02-06 RX ADMIN — SUCRALFATE 1 G: 1 TABLET ORAL at 05:02

## 2025-02-06 RX ADMIN — FLUOXETINE HYDROCHLORIDE 40 MG: 20 CAPSULE ORAL at 09:02

## 2025-02-06 RX ADMIN — APIXABAN 5 MG: 5 TABLET, FILM COATED ORAL at 09:02

## 2025-02-06 RX ADMIN — HYDROXYUREA 1000 MG: 500 CAPSULE ORAL at 09:02

## 2025-02-06 RX ADMIN — MORPHINE SULFATE 30 MG: 15 TABLET, FILM COATED, EXTENDED RELEASE ORAL at 11:02

## 2025-02-06 RX ADMIN — GABAPENTIN 600 MG: 300 CAPSULE ORAL at 09:02

## 2025-02-06 RX ADMIN — POTASSIUM & SODIUM PHOSPHATES POWDER PACK 280-160-250 MG 2 PACKET: 280-160-250 PACK at 02:02

## 2025-02-06 RX ADMIN — HYDROMORPHONE HYDROCHLORIDE 2 MG: 2 INJECTION INTRAMUSCULAR; INTRAVENOUS; SUBCUTANEOUS at 07:02

## 2025-02-06 RX ADMIN — POTASSIUM & SODIUM PHOSPHATES POWDER PACK 280-160-250 MG 2 PACKET: 280-160-250 PACK at 09:02

## 2025-02-06 NOTE — PLAN OF CARE
[address] - Surgery  Discharge Final Note    Primary Care Provider: No, Primary Doctor    Expected Discharge Date: 2/6/2025    Final Discharge Note (most recent)       Final Note - 02/06/25 1407          Final Note    Assessment Type Final Discharge Note     Anticipated Discharge Disposition Home or Self Care     What phone number can be called within the next 1-3 days to see how you are doing after discharge? --   [phone]                    Important Message from Medicare  Important Message from Medicare regarding Discharge Appeal Rights: Given to patient/caregiver, Signed/date by patient/caregiver, Explained to patient/caregiver     Date IMM was signed: 02/06/25  Time IMM was signed: 1228      Future Appointments   Date Time Provider Department Center   2/10/2025 10:30 AM [facility] [id number] [facility] RAD IR [address] [facility]   2/11/2025  1:00 PM Jarred Clark MD [facility] Dong Dominguez     Patient discharged home to care of family on 2/6/25.    Tamar Pandya RNCM  Case Management  [facility]  [phone]

## 2025-02-06 NOTE — PLAN OF CARE
Patient is awake and alert, states pain is controlled on home medication. KUNAL midline removed with tip intact. Patient has ambulated, voided and tolerated PO. Dr Smith at bedside discussing discharge safety due to patient driving after Dilaudid IV. Patient states she is okay to drive, per discussion with MD. Discharge instructions provided at bedside, all questions asked and answered, no other concerns identified. Patient is discharged home via personal car, no escort. No distress noted.

## 2025-02-06 NOTE — TELEPHONE ENCOUNTER
Julieta Wilson RN discussed pain med regimen yesterday 2/5. See documentation in chart.    Pt requested to switch pain medication regimen to Norco and try tizanidine. Pt stated during phone call with Julieta BLANCO that morphine and oxycodone do not manage her pain.     Will discuss with Dr. Clark following pt's discharge. Pt to reach out upon d/c from hospital.

## 2025-02-06 NOTE — DISCHARGE SUMMARY
[facility] Medicine  Discharge Summary      Patient Name: Nazanin Malone  MRN: [id number]  VLADIMIR: [id number]  Patient Class: IP- Inpatient  Admission Date: 2/2/2025  Hospital Length of Stay: 3 days  Discharge Date and Time:  02/06/2025 1:37 PM  Attending Physician: Aracelis Lema MD   Discharging Provider: Pee Smith MD  Primary Care Provider: Kezia Primary Doctor  [facility] Medicine Team: [facility] HOSP MED 3 Pee Smith MD  Primary Care Team: [facility] HOSP MED 3    HPI:   Nazanin Malone is a 44-year-old female with sickle cell beta thalassemia zero, multiple pulmonary emboli on chronic AC, asthma, and HTN who presented to [facility] ED 2/2/25 for diffuse myalgias and pain consistent with a sickle cell pain crisis as well as low grade fever, nausea, vomiting, and diarrhea for the past 2 days. She states that she has been having issues with gastritis recently, with ongoing nausea, vomiting, and watery diarrhea that has been exacerbating her pain. She went to the ED on 1/29/25 with mild relief of pain and was discharged. Her pain continued to persist and she noted a subjective fever of 100.8 so she decided to present again to the ED. She was recently admitted to [facility] 1/7/25 - 1/25/25 for pain c/w sickle cell pain crises which was refractory to her home regimen of hydrea, folic acid oxy. Patient recently moved here from [place]. She was receiving chronic exchange transfusions there but is not currently receiving them as part of her therapy here. She is seeing a hematologist. Of note, patient requested administration of further dilaudid than ordered from nurses. Also asked if nursing needed help to throw away empty syringes and empty med syringe. Patient at recent admission was started on a PCA pump given difficulty weaning dilaudid and also found to have L cephalic vein and partial occlusive thrombus of LIJ.      In the ED, /108 HR 78 RR 20 sats adequate on room air. Tmax 100.9. Labs notable for Hgb  8.2. reticulocyte 1.4. UA was ordered. CXR negative for acute infectious process. Patient received 5 mg dilaudid without resolution of pain, benadryl, and phenergan. Patient admitted to hospital medicine for further workup and management.    * No surgery found *      Hospital Course:   44 y.o F w/ SCD presenting in acute crisis. Suspect viral gastroenteritis and GI losses precipitated current event. Labs and vitals reassuring. Infectious stool studies pending however diarrhea stopped after admission to the hospital. Patient started on PCA pump (0.4 mg q15min) and IVF which was maintained for appx 2 days. Patient subsequently transitioned to oral pain regimen of MS Contin 30 mg BID with Norco  q4 PRN, tizanidine 4 mg q8h. Received 1u pRBC while inpatient on 2/4 with further improvement of symptoms. On 2/6 patient adequately controlled on oral regimen and discharged with follow up with hematology on 2/11.     Goals of Care Treatment Preferences:  Code Status: DNR      SDOH Screening:  The patient was screened for utility difficulties, food insecurity, transport difficulties, housing insecurity, and interpersonal safety and there were no concerns identified this admission.     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 She was [place] [facility] [place] for a similar pain crisis. CXR without focal consolidation, on RA and denying SOB, cough, chest pain. No concern for acute chest on admission.    -  Multimodal pain regimen scheduled tylenol, tizanidine  - Wean off PCA pump 2/5  - Restart home ms contin, start norco  - Folic acid, hydroxyurea      Chronic prescription opiate use  - See Vaso-occlusive pain    Hx pulmonary embolism  Multiple emboli noted in past.    -Continue home anticoagulation    Chronic gastritis  Patient with history of gastritis that is chronic and stable. Associated with chronic diarrhea. Notes recent episodes of vomiting and diarrhea prior to admission. Will give fluids given c/f dehydration and continue home meds  Diarrhea resolved    - Continue home PPI and carafate    Obesity (BMI 30-39.9)  Body mass index is 37.86 kg/m². Morbid obesity complicates all aspects of disease management from diagnostic modalities to treatment. Weight loss encouraged and health benefits explained to patient.         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    Significant Diagnostic Studies: Labs: CMP   Recent Labs   Lab 02/05/25  0800 02/06/25  0516    143   K 4.3 3.8    110   CO2 23 26   GLU 90 85    BUN 17 16   CREATININE 1.1 0.9   CALCIUM 8.7 9.2   PROT 6.7 6.9   ALBUMIN 3.2* 3.3*   BILITOT 0.8 0.4   ALKPHOS 72 80   AST 14 16   ALT 7* 9*   ANIONGAP 5* 7*    and CBC   Recent Labs   Lab 02/05/25  0800 02/06/25  0516   WBC 5.89 7.35   HGB 7.9* 8.6*   HCT 25.1* 26.0*    142*     Radiology:   X-Ray Chest AP Portable   Final Result      See above         Electronically signed by: Ralph Su   Date:    02/02/2025   Time:    14:49      Anesthesia US Guide Vascular Access    (Results Pending)         Pending Diagnostic Studies:       None           Medications:  Reconciled Home Medications:      Medication List        START taking these medications      HYDROcodone-acetaminophen  mg per tablet  Commonly known as: NORCO  Take 1 tablet by mouth every 6 (six) hours as needed for Pain.     tiZANidine 4 MG tablet  Commonly known as: ZANAFLEX  Take 1 tablet (4 mg total) by mouth every 8 (eight) hours. for 10 days            CHANGE how you take these medications      acetaminophen 325 MG tablet  Commonly known as: TYLENOL  Take 2 tablets (650 mg total) by mouth every 8 (eight) hours as needed for Pain.  What changed:   medication strength  how much to take            CONTINUE taking these medications      albuterol 90 mcg/actuation inhaler  Commonly known as: VENTOLIN HFA  Inhale 2 puffs into the lungs every 6 (six) hours as needed for Wheezing or Shortness of Breath. Rescue     amLODIPine 10 MG tablet  Commonly known as: NORVASC  Take 1 tablet (10 mg total) by mouth once daily.     apixaban 5 mg Tab  Commonly known as: ELIQUIS  Take 1 tablet (5 mg total) by mouth 2 (two) times daily.     FLUoxetine 40 MG capsule  Take 1 capsule (40 mg total) by mouth once daily.     fluticasone propionate 50 mcg/actuation nasal spray  Commonly known as: FLONASE  INSTILL 1 SPRAY INTO EACH NOSTRIL EVERY DAY     folic acid 1 MG tablet  Commonly known as: FOLVITE  Take 1 tablet (1 mg total) by mouth once daily.     gabapentin 300  MG capsule  Commonly known as: NEURONTIN  Take 2 capsules (600 mg total) by mouth 3 (three) times daily.     hydroxyurea 500 mg Cap  Commonly known as: HYDREA  Take 2 capsules (1,000 mg total) by mouth once daily.     hydrOXYzine pamoate 25 MG Cap  Commonly known as: VISTARIL  Take 1 capsule (25 mg total) by mouth every 4 (four) hours as needed (Anxiety).     metoclopramide HCl 5 MG tablet  Commonly known as: REGLAN  Take 1 tablet (5 mg total) by mouth 3 (three) times daily before meals.     morphine 30 MG 12 hr tablet  Commonly known as: MS CONTIN  Take 1 tablet (30 mg total) by mouth every 12 (twelve) hours. for 7 days     pantoprazole 40 MG tablet  Commonly known as: PROTONIX  Take 1 tablet (40 mg total) by mouth 2 (two) times daily.     senna-docusate 8.6-50 mg 8.6-50 mg per tablet  Commonly known as: PERICOLACE  Take 1 tablet by mouth daily as needed for Constipation.     sucralfate 1 gram tablet  Commonly known as: CARAFATE  Take 1 tablet (1 g total) by mouth 4 (four) times daily before meals and nightly.     VITAMIN C ORAL  Take 1 capsule by mouth once daily.            STOP taking these medications      methocarbamoL 750 MG Tab  Commonly known as: ROBAXIN              Indwelling Lines/Drains at time of discharge:   Lines/Drains/Airways       None                   Time spent on the discharge of patient: 31 minutes         Pee Simth MD  Internal Medicine, PGY-1  Department of [facility] Medicine  [facility]

## 2025-02-06 NOTE — PLAN OF CARE
Discussed with patient risks vs. benefits of driving home after receiving dilaudid. Patient notes being awake, alert, and not feeling effects of medication. Understands risks and amenable to driving home today with pain managed.

## 2025-02-10 ENCOUNTER — ANESTHESIA (OUTPATIENT)
Dept: INTERVENTIONAL RADIOLOGY/VASCULAR | Facility: HOSPITAL | Age: 45
End: 2025-02-10
Payer: MEDICARE

## 2025-02-10 ENCOUNTER — HOSPITAL ENCOUNTER (OUTPATIENT)
Dept: INTERVENTIONAL RADIOLOGY/VASCULAR | Facility: HOSPITAL | Age: 45
Discharge: HOME | End: 2025-02-10
Attending: FAMILY MEDICINE
Payer: MEDICARE

## 2025-02-10 VITALS
HEIGHT: 62 IN | HEART RATE: 69 BPM | RESPIRATION RATE: 18 BRPM | BODY MASS INDEX: 38.28 KG/M2 | TEMPERATURE: 98 F | OXYGEN SATURATION: 100 % | SYSTOLIC BLOOD PRESSURE: 144 MMHG | WEIGHT: 208 LBS | DIASTOLIC BLOOD PRESSURE: 82 MMHG

## 2025-02-10 DIAGNOSIS — D57.00 SICKLE CELL ANEMIA WITH PAIN: ICD-10-CM

## 2025-02-10 DIAGNOSIS — D68.8 OTHER SPECIFIED COAGULATION DEFECTS: ICD-10-CM

## 2025-02-10 LAB
B-HCG UR QL: NEGATIVE
CTP QC/QA: YES

## 2025-02-10 PROCEDURE — 36561 INSERT TUNNELED CV CATH: CPT | Mod: RT | Performed by: RADIOLOGY

## 2025-02-10 PROCEDURE — 77001 FLUOROGUIDE FOR VEIN DEVICE: CPT | Mod: TC | Performed by: RADIOLOGY

## 2025-02-10 PROCEDURE — 25000003 PHARM REV CODE 250: Performed by: STUDENT IN AN ORGANIZED HEALTH CARE EDUCATION/TRAINING PROGRAM

## 2025-02-10 PROCEDURE — 77001 FLUOROGUIDE FOR VEIN DEVICE: CPT | Mod: 26,,, | Performed by: RADIOLOGY

## 2025-02-10 PROCEDURE — 63600175 PHARM REV CODE 636 W HCPCS: Performed by: STUDENT IN AN ORGANIZED HEALTH CARE EDUCATION/TRAINING PROGRAM

## 2025-02-10 PROCEDURE — C1788 PORT, INDWELLING, IMP: HCPCS

## 2025-02-10 PROCEDURE — 37000008 HC ANESTHESIA 1ST 15 MINUTES

## 2025-02-10 PROCEDURE — C1894 INTRO/SHEATH, NON-LASER: HCPCS

## 2025-02-10 PROCEDURE — 76937 US GUIDE VASCULAR ACCESS: CPT | Mod: 26,,, | Performed by: RADIOLOGY

## 2025-02-10 PROCEDURE — 76937 US GUIDE VASCULAR ACCESS: CPT | Mod: TC | Performed by: RADIOLOGY

## 2025-02-10 PROCEDURE — 27201423 OPTIME MED/SURG SUP & DEVICES STERILE SUPPLY

## 2025-02-10 PROCEDURE — 63600175 PHARM REV CODE 636 W HCPCS: Performed by: ANESTHESIOLOGY

## 2025-02-10 PROCEDURE — 81025 URINE PREGNANCY TEST: CPT | Performed by: FAMILY MEDICINE

## 2025-02-10 PROCEDURE — 37000009 HC ANESTHESIA EA ADD 15 MINS

## 2025-02-10 PROCEDURE — 63600175 PHARM REV CODE 636 W HCPCS: Performed by: RADIOLOGY

## 2025-02-10 PROCEDURE — 63600175 PHARM REV CODE 636 W HCPCS: Mod: JZ,TB | Performed by: ANESTHESIOLOGY

## 2025-02-10 RX ORDER — PHENYLEPHRINE HYDROCHLORIDE 10 MG/ML
INJECTION INTRAVENOUS
Status: DISCONTINUED | OUTPATIENT
Start: 2025-02-10 | End: 2025-02-10

## 2025-02-10 RX ORDER — HEPARIN 100 UNIT/ML
SYRINGE INTRAVENOUS
Status: COMPLETED | OUTPATIENT
Start: 2025-02-10 | End: 2025-02-10

## 2025-02-10 RX ORDER — HYDROMORPHONE HYDROCHLORIDE 1 MG/ML
INJECTION, SOLUTION INTRAMUSCULAR; INTRAVENOUS; SUBCUTANEOUS
Status: DISCONTINUED
Start: 2025-02-10 | End: 2025-02-11 | Stop reason: HOSPADM

## 2025-02-10 RX ORDER — SODIUM CHLORIDE 9 MG/ML
INJECTION, SOLUTION INTRAVENOUS CONTINUOUS
Status: DISCONTINUED | OUTPATIENT
Start: 2025-02-10 | End: 2025-02-11 | Stop reason: HOSPADM

## 2025-02-10 RX ORDER — DIPHENHYDRAMINE HCL 25 MG
25 CAPSULE ORAL EVERY 6 HOURS PRN
Status: DISCONTINUED | OUTPATIENT
Start: 2025-02-10 | End: 2025-02-10

## 2025-02-10 RX ORDER — FENTANYL CITRATE 50 UG/ML
25 INJECTION, SOLUTION INTRAMUSCULAR; INTRAVENOUS EVERY 5 MIN PRN
Status: COMPLETED | OUTPATIENT
Start: 2025-02-10 | End: 2025-02-10

## 2025-02-10 RX ORDER — LIDOCAINE HYDROCHLORIDE 10 MG/ML
1 INJECTION, SOLUTION EPIDURAL; INFILTRATION; INTRACAUDAL; PERINEURAL ONCE
Status: DISCONTINUED | OUTPATIENT
Start: 2025-02-10 | End: 2025-02-11 | Stop reason: HOSPADM

## 2025-02-10 RX ORDER — SODIUM CHLORIDE 0.9 % (FLUSH) 0.9 %
10 SYRINGE (ML) INJECTION
Status: DISCONTINUED | OUTPATIENT
Start: 2025-02-10 | End: 2025-02-11 | Stop reason: HOSPADM

## 2025-02-10 RX ORDER — ONDANSETRON HYDROCHLORIDE 2 MG/ML
4 INJECTION, SOLUTION INTRAVENOUS EVERY 6 HOURS PRN
Status: DISCONTINUED | OUTPATIENT
Start: 2025-02-10 | End: 2025-02-11 | Stop reason: HOSPADM

## 2025-02-10 RX ORDER — FENTANYL CITRATE 50 UG/ML
INJECTION, SOLUTION INTRAMUSCULAR; INTRAVENOUS
Status: DISCONTINUED | OUTPATIENT
Start: 2025-02-10 | End: 2025-02-10

## 2025-02-10 RX ORDER — LIDOCAINE HYDROCHLORIDE 20 MG/ML
INJECTION INTRAVENOUS
Status: DISCONTINUED | OUTPATIENT
Start: 2025-02-10 | End: 2025-02-10

## 2025-02-10 RX ORDER — DIPHENHYDRAMINE HYDROCHLORIDE 50 MG/ML
25 INJECTION INTRAMUSCULAR; INTRAVENOUS EVERY 6 HOURS PRN
Status: DISCONTINUED | OUTPATIENT
Start: 2025-02-10 | End: 2025-02-11 | Stop reason: HOSPADM

## 2025-02-10 RX ORDER — HYDROMORPHONE HYDROCHLORIDE 1 MG/ML
0.2 INJECTION, SOLUTION INTRAMUSCULAR; INTRAVENOUS; SUBCUTANEOUS EVERY 5 MIN PRN
Status: DISCONTINUED | OUTPATIENT
Start: 2025-02-10 | End: 2025-02-10

## 2025-02-10 RX ORDER — HYDROMORPHONE HYDROCHLORIDE 1 MG/ML
0.2 INJECTION, SOLUTION INTRAMUSCULAR; INTRAVENOUS; SUBCUTANEOUS EVERY 5 MIN PRN
Status: DISCONTINUED | OUTPATIENT
Start: 2025-02-10 | End: 2025-02-11 | Stop reason: HOSPADM

## 2025-02-10 RX ORDER — PROPOFOL 10 MG/ML
VIAL (ML) INTRAVENOUS
Status: DISCONTINUED | OUTPATIENT
Start: 2025-02-10 | End: 2025-02-10

## 2025-02-10 RX ORDER — PROPOFOL 10 MG/ML
VIAL (ML) INTRAVENOUS CONTINUOUS PRN
Status: DISCONTINUED | OUTPATIENT
Start: 2025-02-10 | End: 2025-02-10

## 2025-02-10 RX ORDER — MIDAZOLAM HYDROCHLORIDE 1 MG/ML
INJECTION INTRAMUSCULAR; INTRAVENOUS
Status: DISCONTINUED | OUTPATIENT
Start: 2025-02-10 | End: 2025-02-10

## 2025-02-10 RX ORDER — GLUCAGON 1 MG
1 KIT INJECTION
Status: DISCONTINUED | OUTPATIENT
Start: 2025-02-10 | End: 2025-02-11 | Stop reason: HOSPADM

## 2025-02-10 RX ADMIN — HYDROMORPHONE HYDROCHLORIDE 0.2 MG: 1 INJECTION, SOLUTION INTRAMUSCULAR; INTRAVENOUS; SUBCUTANEOUS at 03:02

## 2025-02-10 RX ADMIN — DIPHENHYDRAMINE HYDROCHLORIDE 25 MG: 50 INJECTION, SOLUTION INTRAMUSCULAR; INTRAVENOUS at 01:02

## 2025-02-10 RX ADMIN — HEPARIN 5 ML: 100 SYRINGE at 01:02

## 2025-02-10 RX ADMIN — LIDOCAINE HYDROCHLORIDE 60 MG: 20 INJECTION INTRAVENOUS at 12:02

## 2025-02-10 RX ADMIN — FENTANYL CITRATE 25 MCG: 50 INJECTION, SOLUTION INTRAMUSCULAR; INTRAVENOUS at 01:02

## 2025-02-10 RX ADMIN — PROPOFOL 150 MCG/KG/MIN: 10 INJECTION, EMULSION INTRAVENOUS at 12:02

## 2025-02-10 RX ADMIN — FENTANYL CITRATE 25 MCG: 50 INJECTION, SOLUTION INTRAMUSCULAR; INTRAVENOUS at 12:02

## 2025-02-10 RX ADMIN — SODIUM CHLORIDE: 0.9 INJECTION, SOLUTION INTRAVENOUS at 12:02

## 2025-02-10 RX ADMIN — ONDANSETRON 4 MG: 2 INJECTION INTRAMUSCULAR; INTRAVENOUS at 03:02

## 2025-02-10 RX ADMIN — PHENYLEPHRINE HYDROCHLORIDE 100 MCG: 10 INJECTION INTRAVENOUS at 01:02

## 2025-02-10 RX ADMIN — FENTANYL CITRATE 25 MCG: 50 INJECTION, SOLUTION INTRAMUSCULAR; INTRAVENOUS at 02:02

## 2025-02-10 RX ADMIN — MIDAZOLAM HYDROCHLORIDE 2 MG: 1 INJECTION, SOLUTION INTRAMUSCULAR; INTRAVENOUS at 12:02

## 2025-02-10 RX ADMIN — PROPOFOL 50 MG: 10 INJECTION, EMULSION INTRAVENOUS at 12:02

## 2025-02-10 NOTE — PROCEDURES
Radiology Post-Procedure Note    Pre Op Diagnosis: Sickle Cell  Post Op Diagnosis: Same    Procedure: Port placement    Procedure performed by: Aramis Rodriguez MD    Written Informed Consent Obtained: Yes  Specimen Removed: NO  Estimated Blood Loss: Minimal    Findings:   R chest wall port placed without complication.    Patient tolerated procedure well.    Aramis Rodriguez MD  Interventional Radiologist  Department of Radiology

## 2025-02-10 NOTE — TRANSFER OF CARE
"Anesthesia Transfer of Care Note    Patient: Nazanin Encalade    Procedure(s) Performed: * No procedures listed *    Patient location: PACU    Anesthesia Type: general    Transport from OR: Transported from OR on 6-10 L/min O2 by face mask with adequate spontaneous ventilation    Post pain: adequate analgesia    Post assessment: no apparent anesthetic complications    Post vital signs: stable    Level of consciousness: responds to stimulation    Nausea/Vomiting: no nausea/vomiting    Complications: none    Transfer of care protocol was followed    Last vitals: Visit Vitals  BP (!) 138/92 (BP Location: Right arm, Patient Position: Lying)   Pulse 76   Temp 37.1 °C (98.8 °F) (Temporal)   Resp 18   Ht 5' 2" (1.575 m)   Wt 94.3 kg (208 lb)   LMP  (LMP Unknown)   SpO2 100%   Breastfeeding No   BMI 38.04 kg/m²     "

## 2025-02-10 NOTE — H&P
Radiology History & Physical      SUBJECTIVE:     Chief Complaint: port placement for exchange transfusions    History of Present Illness:  Nazanin Malone is a 44 y.o. female with sickle cell beta thalassemia who presents for port placement.    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    Asthma     Avascular necrosis of femur     Depression     History of pulmonary embolism     Hypertension     Morbid obesity     Opioid dependence 2017    Pneumonia due to Streptococcus pneumoniae 2017    Right lower lobe pneumonia 2017    Sepsis due to Streptococcus pneumoniae 2017    Sickle cell-beta thalassemia disease with pain     Trigeminal neuralgia      Past Surgical History:   Procedure Laterality Date     SECTION      ESOPHAGOGASTRODUODENOSCOPY N/A 2024    Procedure: EGD (ESOPHAGOGASTRODUODENOSCOPY);  Surgeon: Allen Marshall MD;  Location: [address]);  Service: Gastroenterology;  Laterality: N/A;    TONSILLECTOMY      TUBAL LIGATION         Home Meds:   Prior to Admission medications    Medication Sig Start Date End Date Taking? Authorizing Provider   acetaminophen (TYLENOL) 325 MG tablet Take 2 tablets (650 mg total) by mouth every 8 (eight) hours as needed for Pain. 25  Yes Aracelis Lema MD   amLODIPine (NORVASC) 10 MG tablet Take 1 tablet (10 mg total) by mouth once daily. 24  Yes Paola Mckeon MD   HYDROcodone-acetaminophen (NORCO)  mg per tablet Take 1 tablet by mouth every 6 (six) hours as needed for Pain. 25 Yes Pee Smith MD   morphine (MS CONTIN) 30 MG 12 hr tablet Take 1 tablet (30 mg total) by mouth every 12 (twelve) hours. for 7 days 25 Yes Pee Smith MD   tiZANidine (ZANAFLEX) 4 MG tablet Take 1 tablet (4 mg total) by mouth every 8 (eight) hours. for 10 days 25 Yes  Pee Smith MD   albuterol (VENTOLIN HFA) 90 mcg/actuation inhaler Inhale 2 puffs into the lungs every 6 (six) hours as needed for Wheezing or Shortness of Breath. Rescue 8/1/19   Mohan Keen MBBS   apixaban (ELIQUIS) 5 mg Tab Take 1 tablet (5 mg total) by mouth 2 (two) times daily. 11/21/24   Jarred Clark MD   ascorbic acid (VITAMIN C ORAL) Take 1 capsule by mouth once daily.    Provider, Mj   FLUoxetine 40 MG capsule Take 1 capsule (40 mg total) by mouth once daily. 10/31/24   Camryn Silva MD   fluticasone propionate (FLONASE) 50 mcg/actuation nasal spray INSTILL 1 SPRAY INTO EACH NOSTRIL EVERY DAY 2/28/22   Pee Montgomery MD   folic acid (FOLVITE) 1 MG tablet Take 1 tablet (1 mg total) by mouth once daily. 2/6/25 8/5/25  Aracelis Lema MD   gabapentin (NEURONTIN) 300 MG capsule Take 2 capsules (600 mg total) by mouth 3 (three) times daily. 10/31/24 10/31/25  Camryn Silva MD   hydroxyurea (HYDREA) 500 mg Cap Take 2 capsules (1,000 mg total) by mouth once daily. 1/23/25   Jarred Clark MD   hydrOXYzine pamoate (VISTARIL) 25 MG Cap Take 1 capsule (25 mg total) by mouth every 4 (four) hours as needed (Anxiety). 11/15/24   Shlomo Albert MD   metoclopramide HCl (REGLAN) 5 MG tablet Take 1 tablet (5 mg total) by mouth 3 (three) times daily before meals. 12/18/24   Brant Freed MD   pantoprazole (PROTONIX) 40 MG tablet Take 1 tablet (40 mg total) by mouth 2 (two) times daily. 12/18/24 12/18/25  Brant Freed MD   senna-docusate 8.6-50 mg (PERICOLACE) 8.6-50 mg per tablet Take 1 tablet by mouth daily as needed for Constipation. 7/12/24   Aftab Nixon MD   sucralfate (CARAFATE) 1 gram tablet Take 1 tablet (1 g total) by mouth 4 (four) times daily before meals and nightly. 12/18/24 12/18/25  Brant Freed MD     Anticoagulants/Antiplatelets:  reviewed    Allergies:   Review of patient's allergies indicates:   Allergen Reactions    Cat dander Anaphylaxis,  Itching and Shortness Of Breath    Nsaids (non-steroidal anti-inflammatory drug) Itching and Anaphylaxis    Latex Rash     Sedation History:  no adverse reactions    Review of Systems:   Hematological: no known coagulopathies  Respiratory: no shortness of breath  Cardiovascular: no chest pain  Gastrointestinal: no abdominal pain  Genito-Urinary: no dysuria  Musculoskeletal: negative  Neurological: no TIA or stroke symptoms         OBJECTIVE:     Vital Signs (Most Recent)  Temp: 98.8 °F (37.1 °C) (02/10/25 0953)  Pulse: 76 (02/10/25 0953)  Resp: 18 (02/10/25 0953)  BP: (!) 138/92 (02/10/25 0953)  SpO2: 100 % (02/10/25 0953)    Physical Exam:  ASA: per anesthesia  Mallampati: per anesthesia    General: no acute distress  Mental Status: alert and oriented to person, place and time  HEENT: normocephalic, atraumatic  Chest: unlabored breathing  Heart: regular heart rate  Abdomen: nondistended  Extremity: moves all extremities    Laboratory  Lab Results   Component Value Date    INR 1.0 02/10/2025       Lab Results   Component Value Date    WBC 7.35 02/06/2025    HGB 8.6 (L) 02/06/2025    HCT 26.0 (L) 02/06/2025    MCV 69 (L) 02/06/2025     (L) 02/06/2025      Lab Results   Component Value Date    GLU 85 02/06/2025     02/06/2025    K 3.8 02/06/2025     02/06/2025    CO2 26 02/06/2025    BUN 16 02/06/2025    CREATININE 0.9 02/06/2025    CALCIUM 9.2 02/06/2025    MG 1.7 02/06/2025    ALT 9 (L) 02/06/2025    AST 16 02/06/2025    ALBUMIN 3.3 (L) 02/06/2025    BILITOT 0.4 02/06/2025    BILIDIR 0.1 01/19/2020       ASSESSMENT/PLAN:     Sedation Plan: per anesthesia  Patient will undergo chest wall port placement.    Written consent was obtained from the patient. All questions answered.     Hung Manzanares MD  Radiology PGY-2

## 2025-02-10 NOTE — PLAN OF CARE
Received pt to SSCU from home accompanied by daughter.  AAO x 4. Denies pain or discomfort. Respirations even and unlabored. No distress noted. Pt stable.  Admit assessment complete. IV x 1 placed.  Pt oriented to room and call bell placed within reach.

## 2025-02-10 NOTE — DISCHARGE INSTRUCTIONS
"For scheduling: Call  [phone]    For questions or concerns call: LUCILA MON-FRI 8 AM- 5PM [phone]. Radiology resident on call [phone].    For immediate concerns that are not emergent, you may call our radiology clinic at: [phone]  Keep dressing dry and intact for 48 hrs then may shower. Do not remove Dermabond or steri strips as these act as a "scab" to prevent infection. They will dry up and fall off in about 7-10 days  "

## 2025-02-10 NOTE — NURSING
Port placement complete. Pt tolerated well. VSS. No signs or symptoms of distress noted per CRNA. Pt will be transferred to PACU bed escorted by RN and CRNA who will give report.   Placed under direct fluoro and OK to use per Dr Rodriguez

## 2025-02-10 NOTE — ANESTHESIA PREPROCEDURE EVALUATION
02/10/2025  Nazanin Malone is a 44 y.o., female with sickle cell recent hospitalization with prbc tx and pain management now back on home norco for pain, hx PE on AC.   Here for port placement    Nazanin Malone is a 44 y.o. 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    Folate deficiency    Menorrhagia with regular cycle    History of pulmonary embolism    QT prolongation    Sickle cell anemia with crisis    Thrombosis of internal carotid, left    Left-sided weakness    Chronic anticoagulation    Anemia    Chronic gastritis    Drug-seeking behavior    Abdominal pain    Avascular necrosis of femur    Hypercoagulable state    Urinary retention    Hx pulmonary embolism    Chronic prescription opiate use    Bacteremia due to Staphylococcus epidermidis    Superficial venous thrombosis of left arm       Review of patient's allergies indicates:   Allergen Reactions    Cat dander Anaphylaxis, Itching and Shortness Of Breath    Nsaids (non-steroidal anti-inflammatory drug) Itching and Anaphylaxis    Latex Rash       Current Outpatient Medications on File Prior to Encounter   Medication Sig Dispense Refill    acetaminophen (TYLENOL) 325 MG tablet Take 2 tablets (650 mg total) by mouth every 8 (eight) hours as needed for Pain.      amLODIPine (NORVASC) 10 MG tablet Take 1 tablet (10 mg total) by mouth once daily. 90 tablet 3    HYDROcodone-acetaminophen (NORCO)  mg per tablet Take 1 tablet  by mouth every 6 (six) hours as needed for Pain. 28 tablet 0    morphine (MS CONTIN) 30 MG 12 hr tablet Take 1 tablet (30 mg total) by mouth every 12 (twelve) hours. for 7 days 14 tablet 0    tiZANidine (ZANAFLEX) 4 MG tablet Take 1 tablet (4 mg total) by mouth every 8 (eight) hours. for 10 days 30 tablet 0    albuterol (VENTOLIN HFA) 90 mcg/actuation inhaler Inhale 2 puffs into the lungs every 6 (six) hours as needed for Wheezing or Shortness of Breath. Rescue 18 g 2    apixaban (ELIQUIS) 5 mg Tab Take 1 tablet (5 mg total) by mouth 2 (two) times daily. 60 tablet 11    ascorbic acid (VITAMIN C ORAL) Take 1 capsule by mouth once daily.      FLUoxetine 40 MG capsule Take 1 capsule (40 mg total) by mouth once daily. 90 capsule 1    fluticasone propionate (FLONASE) 50 mcg/actuation nasal spray INSTILL 1 SPRAY INTO EACH NOSTRIL EVERY DAY 16 mL 11    folic acid (FOLVITE) 1 MG tablet Take 1 tablet (1 mg total) by mouth once daily. 30 tablet 5    gabapentin (NEURONTIN) 300 MG capsule Take 2 capsules (600 mg total) by mouth 3 (three) times daily. 180 capsule 11    hydroxyurea (HYDREA) 500 mg Cap Take 2 capsules (1,000 mg total) by mouth once daily. 60 capsule 0    hydrOXYzine pamoate (VISTARIL) 25 MG Cap Take 1 capsule (25 mg total) by mouth every 4 (four) hours as needed (Anxiety). 120 capsule 0    metoclopramide HCl (REGLAN) 5 MG tablet Take 1 tablet (5 mg total) by mouth 3 (three) times daily before meals. 30 tablet 11    pantoprazole (PROTONIX) 40 MG tablet Take 1 tablet (40 mg total) by mouth 2 (two) times daily. 180 tablet 3    senna-docusate 8.6-50 mg (PERICOLACE) 8.6-50 mg per tablet Take 1 tablet by mouth daily as needed for Constipation.      sucralfate (CARAFATE) 1 gram tablet Take 1 tablet (1 g total) by mouth 4 (four) times daily before meals and nightly. 120 tablet 11     No current facility-administered medications on file prior to encounter.       Past Surgical History:   Procedure Laterality Date      SECTION      ESOPHAGOGASTRODUODENOSCOPY N/A 2024    Procedure: EGD (ESOPHAGOGASTRODUODENOSCOPY);  Surgeon: lAlen Marshall MD;  Location: [address];  Service: Gastroenterology;  Laterality: N/A;    TONSILLECTOMY      TUBAL LIGATION         Social History     Socioeconomic History    Marital status: Single   Tobacco Use    Smoking status: Never    Smokeless tobacco: Never   Substance and Sexual Activity    Alcohol use: No    Drug use: Yes     Types: Marijuana     Comment: periodically     Sexual activity: Not Currently     Birth control/protection: See Surgical Hx     Social Drivers of Health     Financial Resource Strain: Low Risk  (2025)    Overall Financial Resource Strain (CARDIA)     Difficulty of Paying Living Expenses: Not hard at all   Recent Concern: Financial Resource Strain - High Risk (2024)    Overall Financial Resource Strain (CARDIA)     Difficulty of Paying Living Expenses: Very hard   Food Insecurity: No Food Insecurity (2025)    Hunger Vital Sign     Worried About Running Out of Food in the Last Year: Never true     Ran Out of Food in the Last Year: Never true   Recent Concern: Food Insecurity - Food Insecurity Present (2024)    Hunger Vital Sign     Worried About Running Out of Food in the Last Year: Often true     Ran Out of Food in the Last Year: Often true   Transportation Needs: No Transportation Needs (2025)    TRANSPORTATION NEEDS     Transportation : No   Physical Activity: Inactive (2025)    Exercise Vital Sign     Days of Exercise per Week: 0 days     Minutes of Exercise per Session: 0 min   Stress: No Stress Concern Present (2025)    [employer] - Occupational Stress Questionnaire     Feeling of Stress : Not at all   Recent Concern: Stress - Stress Concern Present (2024)    [employer] - Occupational Stress Questionnaire     Feeling of Stress : Very much   Housing  Stability: Low Risk  (2/4/2025)    Housing Stability Vital Sign     Unable to Pay for Housing in the Last Year: No     Homeless in the Last Year: No   Recent Concern: Housing Stability - High Risk (12/11/2024)    Housing Stability Vital Sign     Unable to Pay for Housing in the Last Year: Yes     Homeless in the Last Year: No       INR  Recent Labs     02/10/25  0859   INR 1.0               2D Echo:      Left Ventricle: The left ventricle is mildly dilated. Mildly increased ventricular mass. Mildly increased wall thickness. Mild septal thickening. There is eccentric hypertrophy. Normal wall motion. There is normal systolic function with a visually estimated ejection fraction of 60 - 65%. Ejection fraction is approximately 63%. There is diastolic dysfunction but grade cannot be determined.    Right Ventricle: Normal right ventricular cavity size. Wall thickness is normal. Systolic function is normal.    IVC/SVC: Normal venous pressure at 3 mmHg.     Pre-op Assessment    I have reviewed the Patient Summary Reports.    I have reviewed the NPO Status.   I have reviewed the Medications.     Review of Systems  Anesthesia Hx:  No problems with previous Anesthesia   History of prior surgery of interest to airway management or planning:            Denies Personal Hx of Anesthesia complications.                    Hematology/Oncology:       -- Anemia:               Hematology Comments: Sickle cell                    Cardiovascular:     Hypertension                                          Pulmonary:    Asthma mild                   Hepatic/GI:  Hepatic/GI Normal                    Neurological:        Chronic Pain Syndrome                         Endocrine:  Endocrine Normal                Physical Exam  General: Well nourished, Cooperative, Alert and Oriented    Airway:  TM Distance: Normal    Dental:  Intact        Anesthesia Plan  Type of Anesthesia, risks & benefits discussed:    Anesthesia Type: Gen Natural  Airway  Intra-op Monitoring Plan: Standard ASA Monitors  Post Op Pain Control Plan: multimodal analgesia  Induction:  IV  Airway Plan: Direct, Post-Induction  Informed Consent: Informed consent signed with the Patient and all parties understand the risks and agree with anesthesia plan.  All questions answered.   ASA Score: 3    Ready For Surgery From Anesthesia Perspective.     .

## 2025-02-10 NOTE — NURSING
Pt arrived to [facility] for port with anesthesia, escorted to room by RN and CRNA who will assume care. Pt oriented to unit and staff. Plan of care reviewed with patient, patient verbalizes understanding. Comfort measures utilized. Pt safely transferred from stretcher to procedural table. Fall risk reviewed with patient, fall risk interventions maintained. , positioner pillows utilized to minimize pressure points. Blankets applied. Pt prepped and draped utilizing standard sterile technique. Patient placed on continuous monitoring, as required by sedation policy. Timeouts completed utilizing standard universal time-out, per department and facility policy. RN to remain at bedside, continuous monitoring maintained. Pt resting comfortably. Denies pain/discomfort. Will continue to monitor. See flow sheets for monitoring, medication administration, and updates.

## 2025-02-11 ENCOUNTER — LAB VISIT (OUTPATIENT)
Dept: LAB | Facility: HOSPITAL | Age: 45
End: 2025-02-11
Payer: MEDICARE

## 2025-02-11 ENCOUNTER — OFFICE VISIT (OUTPATIENT)
Dept: HEMATOLOGY/ONCOLOGY | Facility: CLINIC | Age: 45
End: 2025-02-11
Payer: MEDICARE

## 2025-02-11 ENCOUNTER — INFUSION (OUTPATIENT)
Dept: INFUSION THERAPY | Facility: HOSPITAL | Age: 45
End: 2025-02-11
Payer: MEDICARE

## 2025-02-11 ENCOUNTER — TELEPHONE (OUTPATIENT)
Dept: HEMATOLOGY/ONCOLOGY | Facility: CLINIC | Age: 45
End: 2025-02-11
Payer: MEDICARE

## 2025-02-11 VITALS
HEART RATE: 75 BPM | SYSTOLIC BLOOD PRESSURE: 154 MMHG | OXYGEN SATURATION: 98 % | TEMPERATURE: 99 F | RESPIRATION RATE: 18 BRPM | DIASTOLIC BLOOD PRESSURE: 93 MMHG

## 2025-02-11 VITALS
TEMPERATURE: 99 F | HEIGHT: 62 IN | RESPIRATION RATE: 18 BRPM | WEIGHT: 227.38 LBS | HEART RATE: 78 BPM | DIASTOLIC BLOOD PRESSURE: 98 MMHG | BODY MASS INDEX: 41.84 KG/M2 | OXYGEN SATURATION: 98 % | SYSTOLIC BLOOD PRESSURE: 138 MMHG

## 2025-02-11 DIAGNOSIS — D57.00 HB-SS DISEASE WITH VASO-OCCLUSIVE PAIN: ICD-10-CM

## 2025-02-11 DIAGNOSIS — D57.00 HB-SS DISEASE WITH VASO-OCCLUSIVE PAIN: Primary | ICD-10-CM

## 2025-02-11 DIAGNOSIS — D50.9 IRON DEFICIENCY ANEMIA, UNSPECIFIED IRON DEFICIENCY ANEMIA TYPE: Primary | ICD-10-CM

## 2025-02-11 DIAGNOSIS — D57.439 SICKLE CELL DISEASE, TYPE S BETA-ZERO THALASSEMIA WITH CRISIS: ICD-10-CM

## 2025-02-11 DIAGNOSIS — K29.50 CHRONIC GASTRITIS, PRESENCE OF BLEEDING UNSPECIFIED, UNSPECIFIED GASTRITIS TYPE: ICD-10-CM

## 2025-02-11 DIAGNOSIS — R10.9 ABDOMINAL CRAMPING: ICD-10-CM

## 2025-02-11 DIAGNOSIS — D57.00 VASO-OCCLUSIVE PAIN DUE TO SICKLE CELL DISEASE: ICD-10-CM

## 2025-02-11 DIAGNOSIS — D57.00 SICKLE-CELL DISEASE WITH PAIN: ICD-10-CM

## 2025-02-11 DIAGNOSIS — D57.00 SICKLE CELL PAIN CRISIS: ICD-10-CM

## 2025-02-11 LAB
ALBUMIN SERPL BCP-MCNC: 3.8 G/DL (ref 3.5–5.2)
ALP SERPL-CCNC: 102 U/L (ref 40–150)
ALT SERPL W/O P-5'-P-CCNC: 25 U/L (ref 10–44)
ANION GAP SERPL CALC-SCNC: 11 MMOL/L (ref 8–16)
AST SERPL-CCNC: 32 U/L (ref 10–40)
BASOPHILS # BLD AUTO: 0.05 K/UL (ref 0–0.2)
BASOPHILS NFR BLD: 0.6 % (ref 0–1.9)
BILIRUB SERPL-MCNC: 0.7 MG/DL (ref 0.1–1)
BUN SERPL-MCNC: 11 MG/DL (ref 6–20)
CALCIUM SERPL-MCNC: 9.8 MG/DL (ref 8.7–10.5)
CHLORIDE SERPL-SCNC: 105 MMOL/L (ref 95–110)
CO2 SERPL-SCNC: 23 MMOL/L (ref 23–29)
CREAT SERPL-MCNC: 1 MG/DL (ref 0.5–1.4)
DIFFERENTIAL METHOD BLD: ABNORMAL
EOSINOPHIL # BLD AUTO: 0.3 K/UL (ref 0–0.5)
EOSINOPHIL NFR BLD: 4.4 % (ref 0–8)
ERYTHROCYTE [DISTWIDTH] IN BLOOD BY AUTOMATED COUNT: 26.4 % (ref 11.5–14.5)
EST. GFR  (NO RACE VARIABLE): >60 ML/MIN/1.73 M^2
FERRITIN SERPL-MCNC: 50 NG/ML (ref 20–300)
GLUCOSE SERPL-MCNC: 95 MG/DL (ref 70–110)
HCT VFR BLD AUTO: 32.3 % (ref 37–48.5)
HGB BLD-MCNC: 10.4 G/DL (ref 12–16)
IMM GRANULOCYTES # BLD AUTO: 0.06 K/UL (ref 0–0.04)
IMM GRANULOCYTES NFR BLD AUTO: 0.8 % (ref 0–0.5)
IRON SERPL-MCNC: 35 UG/DL (ref 30–160)
LYMPHOCYTES # BLD AUTO: 2.2 K/UL (ref 1–4.8)
LYMPHOCYTES NFR BLD: 28 % (ref 18–48)
MAGNESIUM SERPL-MCNC: 1.8 MG/DL (ref 1.6–2.6)
MCH RBC QN AUTO: 22.6 PG (ref 27–31)
MCHC RBC AUTO-ENTMCNC: 32.2 G/DL (ref 32–36)
MCV RBC AUTO: 70 FL (ref 82–98)
MONOCYTES # BLD AUTO: 0.5 K/UL (ref 0.3–1)
MONOCYTES NFR BLD: 6.5 % (ref 4–15)
NEUTROPHILS # BLD AUTO: 4.6 K/UL (ref 1.8–7.7)
NEUTROPHILS NFR BLD: 59.7 % (ref 38–73)
NRBC BLD-RTO: 7 /100 WBC
PHOSPHATE SERPL-MCNC: 3.4 MG/DL (ref 2.7–4.5)
PLATELET # BLD AUTO: 184 K/UL (ref 150–450)
PMV BLD AUTO: 9.7 FL (ref 9.2–12.9)
POTASSIUM SERPL-SCNC: 3.9 MMOL/L (ref 3.5–5.1)
PROT SERPL-MCNC: 7.9 G/DL (ref 6–8.4)
RBC # BLD AUTO: 4.6 M/UL (ref 4–5.4)
RETICS/RBC NFR AUTO: 1.8 % (ref 0.5–2.5)
SATURATED IRON: 7 % (ref 20–50)
SODIUM SERPL-SCNC: 139 MMOL/L (ref 136–145)
TOTAL IRON BINDING CAPACITY: 514 UG/DL (ref 250–450)
TRANSFERRIN SERPL-MCNC: 347 MG/DL (ref 200–375)
WBC # BLD AUTO: 7.75 K/UL (ref 3.9–12.7)

## 2025-02-11 PROCEDURE — 3008F BODY MASS INDEX DOCD: CPT | Mod: CPTII,S$GLB,, | Performed by: INTERNAL MEDICINE

## 2025-02-11 PROCEDURE — 85025 COMPLETE CBC W/AUTO DIFF WBC: CPT | Performed by: INTERNAL MEDICINE

## 2025-02-11 PROCEDURE — 96375 TX/PRO/DX INJ NEW DRUG ADDON: CPT

## 2025-02-11 PROCEDURE — 63600175 PHARM REV CODE 636 W HCPCS: Mod: JZ,TB | Performed by: PHYSICIAN ASSISTANT

## 2025-02-11 PROCEDURE — 3075F SYST BP GE 130 - 139MM HG: CPT | Mod: CPTII,S$GLB,, | Performed by: INTERNAL MEDICINE

## 2025-02-11 PROCEDURE — 1111F DSCHRG MED/CURRENT MED MERGE: CPT | Mod: CPTII,S$GLB,, | Performed by: INTERNAL MEDICINE

## 2025-02-11 PROCEDURE — 84100 ASSAY OF PHOSPHORUS: CPT | Performed by: INTERNAL MEDICINE

## 2025-02-11 PROCEDURE — 80053 COMPREHEN METABOLIC PANEL: CPT | Performed by: INTERNAL MEDICINE

## 2025-02-11 PROCEDURE — 25000003 PHARM REV CODE 250: Performed by: INTERNAL MEDICINE

## 2025-02-11 PROCEDURE — 99999 PR PBB SHADOW E&M-EST. PATIENT-LVL V: CPT | Mod: PBBFAC,,, | Performed by: INTERNAL MEDICINE

## 2025-02-11 PROCEDURE — 83735 ASSAY OF MAGNESIUM: CPT | Performed by: INTERNAL MEDICINE

## 2025-02-11 PROCEDURE — 3080F DIAST BP >= 90 MM HG: CPT | Mod: CPTII,S$GLB,, | Performed by: INTERNAL MEDICINE

## 2025-02-11 PROCEDURE — 82728 ASSAY OF FERRITIN: CPT | Performed by: STUDENT IN AN ORGANIZED HEALTH CARE EDUCATION/TRAINING PROGRAM

## 2025-02-11 PROCEDURE — 84466 ASSAY OF TRANSFERRIN: CPT | Performed by: STUDENT IN AN ORGANIZED HEALTH CARE EDUCATION/TRAINING PROGRAM

## 2025-02-11 PROCEDURE — 63600175 PHARM REV CODE 636 W HCPCS: Performed by: INTERNAL MEDICINE

## 2025-02-11 PROCEDURE — 96376 TX/PRO/DX INJ SAME DRUG ADON: CPT

## 2025-02-11 PROCEDURE — 85045 AUTOMATED RETICULOCYTE COUNT: CPT | Performed by: INTERNAL MEDICINE

## 2025-02-11 PROCEDURE — 96374 THER/PROPH/DIAG INJ IV PUSH: CPT

## 2025-02-11 PROCEDURE — 96361 HYDRATE IV INFUSION ADD-ON: CPT

## 2025-02-11 RX ORDER — HEPARIN 100 UNIT/ML
500 SYRINGE INTRAVENOUS
Status: DISCONTINUED | OUTPATIENT
Start: 2025-02-11 | End: 2025-02-11 | Stop reason: HOSPADM

## 2025-02-11 RX ORDER — HYDROMORPHONE HYDROCHLORIDE 2 MG/ML
1 INJECTION, SOLUTION INTRAMUSCULAR; INTRAVENOUS; SUBCUTANEOUS ONCE
Status: COMPLETED | OUTPATIENT
Start: 2025-02-11 | End: 2025-02-11

## 2025-02-11 RX ORDER — LIDOCAINE AND PRILOCAINE 25; 25 MG/G; MG/G
CREAM TOPICAL
Qty: 30 G | Refills: 1 | Status: ON HOLD | OUTPATIENT
Start: 2025-02-11 | End: 2025-02-22 | Stop reason: HOSPADM

## 2025-02-11 RX ORDER — HYDROMORPHONE HYDROCHLORIDE 1 MG/ML
1 INJECTION, SOLUTION INTRAMUSCULAR; INTRAVENOUS; SUBCUTANEOUS ONCE
Status: CANCELLED
Start: 2025-02-11 | End: 2025-02-11

## 2025-02-11 RX ORDER — SODIUM CHLORIDE 0.9 % (FLUSH) 0.9 %
10 SYRINGE (ML) INJECTION
Status: CANCELLED | OUTPATIENT
Start: 2025-02-11

## 2025-02-11 RX ORDER — NALOXONE HYDROCHLORIDE 4 MG/.1ML
SPRAY NASAL
Qty: 2 EACH | Refills: 11 | Status: SHIPPED | OUTPATIENT
Start: 2025-02-11

## 2025-02-11 RX ORDER — DIPHENHYDRAMINE HYDROCHLORIDE 50 MG/ML
25 INJECTION INTRAMUSCULAR; INTRAVENOUS ONCE
Status: CANCELLED | OUTPATIENT
Start: 2025-02-11 | End: 2025-02-11

## 2025-02-11 RX ORDER — MORPHINE SULFATE 30 MG/1
30 TABLET, FILM COATED, EXTENDED RELEASE ORAL EVERY 8 HOURS
Qty: 90 TABLET | Refills: 0 | Status: SHIPPED | OUTPATIENT
Start: 2025-02-11

## 2025-02-11 RX ORDER — SUCRALFATE 1 G/1
1 TABLET ORAL
Qty: 120 TABLET | Refills: 11 | Status: SHIPPED | OUTPATIENT
Start: 2025-02-11 | End: 2026-02-11

## 2025-02-11 RX ORDER — HYDROMORPHONE HYDROCHLORIDE 2 MG/ML
0.5 INJECTION, SOLUTION INTRAMUSCULAR; INTRAVENOUS; SUBCUTANEOUS ONCE
Status: COMPLETED | OUTPATIENT
Start: 2025-02-11 | End: 2025-02-11

## 2025-02-11 RX ORDER — HEPARIN 100 UNIT/ML
500 SYRINGE INTRAVENOUS
Status: CANCELLED | OUTPATIENT
Start: 2025-02-11

## 2025-02-11 RX ORDER — DICYCLOMINE HYDROCHLORIDE 10 MG/1
10 CAPSULE ORAL
Qty: 120 CAPSULE | Refills: 0 | Status: SHIPPED | OUTPATIENT
Start: 2025-02-11 | End: 2025-03-14

## 2025-02-11 RX ORDER — LACTULOSE 10 G/15ML
SOLUTION ORAL; RECTAL
COMMUNITY
End: 2025-02-17 | Stop reason: CLARIF

## 2025-02-11 RX ORDER — HYDROCODONE BITARTRATE AND ACETAMINOPHEN 10; 325 MG/1; MG/1
1 TABLET ORAL EVERY 4 HOURS PRN
Qty: 120 TABLET | Refills: 0 | Status: SHIPPED | OUTPATIENT
Start: 2025-02-11

## 2025-02-11 RX ORDER — DIPHENHYDRAMINE HYDROCHLORIDE 50 MG/ML
12.5 INJECTION INTRAMUSCULAR; INTRAVENOUS ONCE
Status: COMPLETED | OUTPATIENT
Start: 2025-02-11 | End: 2025-02-11

## 2025-02-11 RX ORDER — DIPHENHYDRAMINE HYDROCHLORIDE 50 MG/ML
25 INJECTION INTRAMUSCULAR; INTRAVENOUS ONCE
Status: COMPLETED | OUTPATIENT
Start: 2025-02-11 | End: 2025-02-11

## 2025-02-11 RX ORDER — METHOCARBAMOL 750 MG/1
750 TABLET, FILM COATED ORAL 3 TIMES DAILY PRN
Qty: 30 TABLET | Refills: 0 | OUTPATIENT
Start: 2025-02-11

## 2025-02-11 RX ORDER — SODIUM CHLORIDE 0.9 % (FLUSH) 0.9 %
10 SYRINGE (ML) INJECTION
Status: DISCONTINUED | OUTPATIENT
Start: 2025-02-11 | End: 2025-02-11 | Stop reason: HOSPADM

## 2025-02-11 RX ADMIN — HYDROMORPHONE HYDROCHLORIDE 0.5 MG: 2 INJECTION INTRAMUSCULAR; INTRAVENOUS; SUBCUTANEOUS at 03:02

## 2025-02-11 RX ADMIN — DIPHENHYDRAMINE HYDROCHLORIDE 25 MG: 50 INJECTION INTRAMUSCULAR; INTRAVENOUS at 02:02

## 2025-02-11 RX ADMIN — DIPHENHYDRAMINE HYDROCHLORIDE 12.5 MG: 50 INJECTION INTRAMUSCULAR; INTRAVENOUS at 03:02

## 2025-02-11 RX ADMIN — HYDROMORPHONE HYDROCHLORIDE 1 MG: 2 INJECTION INTRAMUSCULAR; INTRAVENOUS; SUBCUTANEOUS at 02:02

## 2025-02-11 RX ADMIN — HEPARIN 500 UNITS: 100 SYRINGE at 04:02

## 2025-02-11 RX ADMIN — SODIUM CHLORIDE 1000 ML: 9 INJECTION, SOLUTION INTRAVENOUS at 02:02

## 2025-02-11 NOTE — PROGRESS NOTES
HEMATOLOGIC MALIGNANCIES PROGRESS NOTE    IDENTIFYING STATEMENT   Nazanin Mancini) is a 44 y.o. female with a  of 1980 from [place], [place] with the diagnosis of hemoglobin S-beta zero thalassemia.      ONCOLOGY HISTORY:    Hemoglobin S-beta zero thalassemia  History of pulmonary embolism  Anxiety and depression  Asthma  Hypertension  History of L internal carotid artery thrombosis  Obesity - Body mass index is 41.59 kg/m².  Avascular necrosis of the femur    INTERVAL HISTORY:      - 2025 - 2025: Admitted with vaso-occlusive crisis - attributed to gastroenteriritis    She is in substantial pain and struggling to manage it as there were changes to her medications from the hospital. She also has chronic abdominal pain that has been gradually worsening. She is not eating and losing weight. She inquires about restarting RBC exchanges she was getting while she was in [place].     Past Medical History, Past Social History and Past Family History have been reviewed and are unchanged except as noted in the interval history.    MEDICATIONS:     No current facility-administered medications on file prior to visit.     Current Outpatient Medications on File Prior to Visit   Medication Sig Dispense Refill    acetaminophen (TYLENOL) 325 MG tablet Take 2 tablets (650 mg total) by mouth every 8 (eight) hours as needed for Pain.      albuterol (VENTOLIN HFA) 90 mcg/actuation inhaler Inhale 2 puffs into the lungs every 6 (six) hours as needed for Wheezing or Shortness of Breath. Rescue 18 g 2    amLODIPine (NORVASC) 10 MG tablet Take 1 tablet (10 mg total) by mouth once daily. 90 tablet 3    apixaban (ELIQUIS) 5 mg Tab Take 1 tablet (5 mg total) by mouth 2 (two) times daily. 60 tablet 11    ascorbic acid (VITAMIN C ORAL) Take 1 capsule by mouth 2 (two) times a day.      FLUoxetine 40 MG capsule Take 1 capsule (40 mg total) by mouth once daily. 90 capsule 1    fluticasone propionate (FLONASE) 50  mcg/actuation nasal spray INSTILL 1 SPRAY INTO EACH NOSTRIL EVERY DAY 16 mL 11    folic acid (FOLVITE) 1 MG tablet Take 1 tablet (1 mg total) by mouth once daily. 30 tablet 5    gabapentin (NEURONTIN) 300 MG capsule Take 2 capsules (600 mg total) by mouth 3 (three) times daily. (Patient taking differently: Take 3 capsules by mouth 3 (three) times daily.) 180 capsule 11    hydroxyurea (HYDREA) 500 mg Cap Take 2 capsules (1,000 mg total) by mouth once daily. (Patient not taking: Reported on 2/17/2025.) 60 capsule 0    pantoprazole (PROTONIX) 40 MG tablet Take 1 tablet (40 mg total) by mouth 2 (two) times daily. (Patient taking differently: Take 40 mg by mouth once daily.) 180 tablet 3    senna-docusate 8.6-50 mg (PERICOLACE) 8.6-50 mg per tablet Take 1 tablet by mouth daily as needed for Constipation.      tiZANidine (ZANAFLEX) 4 MG tablet Take 1 tablet (4 mg total) by mouth every 8 (eight) hours. for 10 days (Patient taking differently: Take 4 mg by mouth 2 (two) times a day.) 30 tablet 0       ALLERGIES:   Review of patient's allergies indicates:   Allergen Reactions    Cat dander Anaphylaxis, Itching and Shortness Of Breath    Nsaids (non-steroidal anti-inflammatory drug) Itching and Anaphylaxis    Latex Rash        ROS:       Review of Systems   Constitutional:  Negative for diaphoresis, fatigue, fever and unexpected weight change.   HENT:   Negative for lump/mass and sore throat.    Eyes:  Negative for icterus.   Respiratory:  Negative for cough and shortness of breath.    Cardiovascular:  Negative for chest pain and palpitations.   Gastrointestinal:  Positive for abdominal pain. Negative for abdominal distention, constipation, diarrhea, nausea and vomiting.   Genitourinary:  Negative for dysuria and frequency.    Musculoskeletal:  Positive for arthralgias, back pain and myalgias. Negative for gait problem.   Skin:  Negative for rash.   Neurological:  Negative for dizziness, gait problem and headaches.  "  Hematological:  Negative for adenopathy. Does not bruise/bleed easily.   Psychiatric/Behavioral:  The patient is not nervous/anxious.        PHYSICAL EXAM:  Vitals:    02/11/25 1239   BP: (!) 138/98   Pulse: 78   Resp: 18   Temp: 98.5 °F (36.9 °C)   TempSrc: Oral   SpO2: 98%   Weight: 103.1 kg (227 lb 6.5 oz)   Height: 5' 2" (1.575 m)   PainSc: 10-Worst pain ever   PainLoc: Chest     Physical Exam  Constitutional:       General: She is not in acute distress.     Appearance: She is well-developed.   HENT:      Head: Normocephalic and atraumatic.      Mouth/Throat:      Mouth: No oral lesions.   Eyes:      Conjunctiva/sclera: Conjunctivae normal.   Neck:      Thyroid: No thyromegaly.   Cardiovascular:      Rate and Rhythm: Normal rate and regular rhythm.      Heart sounds: Normal heart sounds. No murmur heard.  Pulmonary:      Breath sounds: Normal breath sounds. No wheezing or rales.   Abdominal:      General: There is no distension.      Palpations: Abdomen is soft. There is no hepatomegaly, splenomegaly or mass.      Tenderness: There is no abdominal tenderness.   Lymphadenopathy:      Cervical: No cervical adenopathy.      Right cervical: No deep cervical adenopathy.     Left cervical: No deep cervical adenopathy.   Skin:     Findings: No rash.   Neurological:      Mental Status: She is alert and oriented to person, place, and time.      Cranial Nerves: No cranial nerve deficit.      Coordination: Coordination normal.      Deep Tendon Reflexes: Reflexes are normal and symmetric.         LAB:   Results for orders placed or performed in visit on 02/11/25   CBC Auto Differential    Collection Time: 02/11/25 12:52 PM   Result Value Ref Range    WBC 7.75 3.90 - 12.70 K/uL    RBC 4.60 4.00 - 5.40 M/uL    Hemoglobin 10.4 (L) 12.0 - 16.0 g/dL    Hematocrit 32.3 (L) 37.0 - 48.5 %    MCV 70 (L) 82 - 98 fL    MCH 22.6 (L) 27.0 - 31.0 pg    MCHC 32.2 32.0 - 36.0 g/dL    RDW 26.4 (H) 11.5 - 14.5 %    Platelets 184 150 - 450 " K/uL    MPV 9.7 9.2 - 12.9 fL    Immature Granulocytes 0.8 (H) 0.0 - 0.5 %    Gran # (ANC) 4.6 1.8 - 7.7 K/uL    Immature Grans (Abs) 0.06 (H) 0.00 - 0.04 K/uL    Lymph # 2.2 1.0 - 4.8 K/uL    Mono # 0.5 0.3 - 1.0 K/uL    Eos # 0.3 0.0 - 0.5 K/uL    Baso # 0.05 0.00 - 0.20 K/uL    nRBC 7 (A) 0 /100 WBC    Gran % 59.7 38.0 - 73.0 %    Lymph % 28.0 18.0 - 48.0 %    Mono % 6.5 4.0 - 15.0 %    Eosinophil % 4.4 0.0 - 8.0 %    Basophil % 0.6 0.0 - 1.9 %    Differential Method Automated    Comprehensive Metabolic Panel    Collection Time: 02/11/25 12:52 PM   Result Value Ref Range    Sodium 139 136 - 145 mmol/L    Potassium 3.9 3.5 - 5.1 mmol/L    Chloride 105 95 - 110 mmol/L    CO2 23 23 - 29 mmol/L    Glucose 95 70 - 110 mg/dL    BUN 11 6 - 20 mg/dL    Creatinine 1.0 0.5 - 1.4 mg/dL    Calcium 9.8 8.7 - 10.5 mg/dL    Total Protein 7.9 6.0 - 8.4 g/dL    Albumin 3.8 3.5 - 5.2 g/dL    Total Bilirubin 0.7 0.1 - 1.0 mg/dL    Alkaline Phosphatase 102 40 - 150 U/L    AST 32 10 - 40 U/L    ALT 25 10 - 44 U/L    eGFR >60.0 >60 mL/min/1.73 m^2    Anion Gap 11 8 - 16 mmol/L   Reticulocytes    Collection Time: 02/11/25 12:52 PM   Result Value Ref Range    Retic 1.8 0.5 - 2.5 %   FERRITIN    Collection Time: 02/11/25 12:52 PM   Result Value Ref Range    Ferritin 50 20.0 - 300.0 ng/mL   IRON AND TIBC    Collection Time: 02/11/25 12:52 PM   Result Value Ref Range    Iron 35 30 - 160 ug/dL    Transferrin 347 200 - 375 mg/dL    TIBC 514 (H) 250 - 450 ug/dL    Saturated Iron 7 (L) 20 - 50 %   Magnesium    Collection Time: 02/11/25 12:52 PM   Result Value Ref Range    Magnesium 1.8 1.6 - 2.6 mg/dL   Phosphorus    Collection Time: 02/11/25 12:52 PM   Result Value Ref Range    Phosphorus 3.4 2.7 - 4.5 mg/dL     *Note: Due to a large number of results and/or encounters for the requested time period, some results have not been displayed. A complete set of results can be found in Results Review.       PROBLEMS ASSESSED THIS VISIT:    1. Hb-SS  disease with vaso-occlusive pain    2. Sickle cell pain crisis    3. Chronic gastritis, presence of bleeding unspecified, unspecified gastritis type    4. Abdominal cramping        PLAN:       Hemoglobin S-beta zero thalassemia  Complex disease history with recurrent hospitalization for vaso-occlusive pain crises and prior need for scheduled RBC exchange transfusions.     Stressed importance of disease modification with hydroxyurea. She will continue 1000 mg daily.    Will refer to apheresis to consider scheduled pRBC exchange transfusions.     Pain regimen as follows:   - MS Contin 30 mg q8hrs   - hydrocodone-APAP 10/325 q4h prn pain    Abdominal pain  Has history of gastritis. Refer to gastroenterology. Prescribed dicyclomine and sucralfate. Continue pantoprazole BID.     Follow-up  Will need follow-up in classical hematology with Dr. Soni to optimize sickle cell disease management. Discussed with patient today.     Route Chart for Scheduling    BMT Chart Routing      Follow up with physician 3 months. with Dr. Soni. Needs transition of care to sickle cell clinician.   Follow up with [facility]    Provider visit type Benign hem   Infusion scheduling note    Injection scheduling note    Labs CBC and CMP   Scheduling:  Preferred lab:  Lab interval:     Imaging    Pharmacy appointment    Other referrals                    Supportive Plan Information  IV FLUIDS AND ELECTROLYTES Chelsea Jones NP   Associated Diagnosis: Iron deficiency anemia   noted on 2/21/2017  Associated Diagnosis: Vaso-occlusive pain due to sickle cell disease   noted on 4/16/2017  Associated Diagnosis: Sickle cell disease, type S beta-zero thalassemia with crisis   noted on 4/26/2019  Associated Diagnosis: Hb-SS disease with vaso-occlusive pain   noted on 7/30/2019   Line of treatment: Supportive Care   Treatment goal: Supportive     Upcoming Treatment Dates - IV FLUIDS AND ELECTROLYTES    No upcoming days in selected categories.      Jarred MCLAUGHLIN  MD [facility]  Hematologic Malignancies, Stem Cell Transplantation, and Cellular Therapy

## 2025-02-11 NOTE — TELEPHONE ENCOUNTER
Offered pt appointment for IVF and pain medication today at 2:00 PM. Pt verbalized understanding and agreeable to appointments today.

## 2025-02-11 NOTE — PLAN OF CARE
Problem: Infection  Goal: Absence of Infection Signs and Symptoms  Outcome: Progressing     Ambulatory to clinic with no c/o adverse effects or s/s of infection.  PAC accessed, flushed with out difficulty, blood return noted and infused with no problems.  IVF's and IV pain meds tolerated with no problems.  Ambulatory home.  NAD.

## 2025-02-11 NOTE — TELEPHONE ENCOUNTER
----- Message from Kathy sent at 2/11/2025  9:25 AM CST -----  Regarding: Pain medication  Contact: Nazanin  Consult/Advisory     Name Of Caller: Nazanin Malone            Contact Preference:   [phone] (Mobile) , requesting a call back.          Nature of call: pt has an appt for 1:00 pm today, wants to know if she can come in earlier for  fluids and pain medication, states  she is in a lot of pain.

## 2025-02-12 LAB — PATHOLOGIST INTERPRETATION AB/XM: NORMAL

## 2025-02-13 NOTE — ANESTHESIA POSTPROCEDURE EVALUATION
Anesthesia Post Evaluation    Patient: Trushanda Encalade    Procedure(s) Performed: * No procedures listed *    Final Anesthesia Type: general      Patient location during evaluation: PACU  Patient participation: Yes- Able to Participate  Level of consciousness: awake  Post-procedure vital signs: reviewed and stable  Pain management: adequate  Airway patency: patent    PONV status at discharge: No PONV  Anesthetic complications: no      Cardiovascular status: blood pressure returned to baseline  Respiratory status: unassisted  Hydration status: euvolemic  Follow-up not needed.              Vitals Value Taken Time   /82 02/10/25 1600   Temp 36.7 °C (98.1 °F) 02/10/25 1324   Pulse 69 02/10/25 1600   Resp 18 02/10/25 1600   SpO2 100 % 02/10/25 1600         No case tracking events are documented in the log.      Pain/Olivia Score: No data recorded

## 2025-02-14 DIAGNOSIS — D57.00 HB-SS DISEASE WITH VASO-OCCLUSIVE PAIN: Primary | ICD-10-CM

## 2025-02-14 NOTE — TELEPHONE ENCOUNTER
----- Message from Kathy sent at 2/14/2025 10:48 AM CST -----  Regarding: Rx refill  Contact: Lanie  Regarding: Rx refill        Name Of Caller: Nazanin Malone          Contact Preference:[phone] (Mobile)      Nature of call:  pt is calling to have the following medication refilled  tiZANidine (ZANAFLEX) 4 MG tablet        Pharmacy:       [facility] [address] [place] [address]  Phone: [phone] Fax: [phone]      Note:  pt states she may need an alternative for her Eloquis, if a call is made , she can get a coupon to get for  $10.00

## 2025-02-16 ENCOUNTER — HOSPITAL ENCOUNTER (INPATIENT)
Facility: HOSPITAL | Age: 45
LOS: 5 days | Discharge: HOME | DRG: 812 | End: 2025-02-22
Attending: EMERGENCY MEDICINE | Admitting: STUDENT IN AN ORGANIZED HEALTH CARE EDUCATION/TRAINING PROGRAM
Payer: MEDICARE

## 2025-02-16 DIAGNOSIS — Z86.711 HISTORY OF PULMONARY EMBOLISM: ICD-10-CM

## 2025-02-16 DIAGNOSIS — R68.89 FLU-LIKE SYMPTOMS: ICD-10-CM

## 2025-02-16 DIAGNOSIS — R07.9 CHEST PAIN: ICD-10-CM

## 2025-02-16 DIAGNOSIS — D57.00 HB-SS DISEASE WITH VASO-OCCLUSIVE PAIN: ICD-10-CM

## 2025-02-16 DIAGNOSIS — D57.00 SICKLE CELL PAIN CRISIS: Primary | ICD-10-CM

## 2025-02-16 LAB
INFLUENZA A, MOLECULAR: NEGATIVE
INFLUENZA B, MOLECULAR: NEGATIVE
SARS-COV-2 RDRP RESP QL NAA+PROBE: NEGATIVE
SPECIMEN SOURCE: NORMAL

## 2025-02-16 PROCEDURE — 96375 TX/PRO/DX INJ NEW DRUG ADDON: CPT

## 2025-02-16 PROCEDURE — 93010 ELECTROCARDIOGRAM REPORT: CPT | Mod: ,,, | Performed by: INTERNAL MEDICINE

## 2025-02-16 PROCEDURE — 87635 SARS-COV-2 COVID-19 AMP PRB: CPT | Performed by: PHYSICIAN ASSISTANT

## 2025-02-16 PROCEDURE — 99285 EMERGENCY DEPT VISIT HI MDM: CPT | Mod: 25

## 2025-02-16 PROCEDURE — 83690 ASSAY OF LIPASE: CPT | Performed by: PHYSICIAN ASSISTANT

## 2025-02-16 PROCEDURE — 80053 COMPREHEN METABOLIC PANEL: CPT | Performed by: PHYSICIAN ASSISTANT

## 2025-02-16 PROCEDURE — 84484 ASSAY OF TROPONIN QUANT: CPT | Performed by: PHYSICIAN ASSISTANT

## 2025-02-16 PROCEDURE — 85045 AUTOMATED RETICULOCYTE COUNT: CPT | Performed by: PHYSICIAN ASSISTANT

## 2025-02-16 PROCEDURE — 63600175 PHARM REV CODE 636 W HCPCS: Performed by: PHYSICIAN ASSISTANT

## 2025-02-16 PROCEDURE — 85025 COMPLETE CBC W/AUTO DIFF WBC: CPT | Performed by: PHYSICIAN ASSISTANT

## 2025-02-16 PROCEDURE — 93005 ELECTROCARDIOGRAM TRACING: CPT

## 2025-02-16 PROCEDURE — 87502 INFLUENZA DNA AMP PROBE: CPT | Performed by: PHYSICIAN ASSISTANT

## 2025-02-16 PROCEDURE — 96374 THER/PROPH/DIAG INJ IV PUSH: CPT

## 2025-02-16 RX ORDER — ONDANSETRON HYDROCHLORIDE 2 MG/ML
4 INJECTION, SOLUTION INTRAVENOUS
Status: COMPLETED | OUTPATIENT
Start: 2025-02-16 | End: 2025-02-16

## 2025-02-16 RX ORDER — HYDROMORPHONE HYDROCHLORIDE 1 MG/ML
2 INJECTION, SOLUTION INTRAMUSCULAR; INTRAVENOUS; SUBCUTANEOUS
Refills: 0 | Status: COMPLETED | OUTPATIENT
Start: 2025-02-16 | End: 2025-02-16

## 2025-02-16 RX ORDER — ACETAMINOPHEN 325 MG/1
650 TABLET ORAL
Status: COMPLETED | OUTPATIENT
Start: 2025-02-16 | End: 2025-02-17

## 2025-02-16 RX ORDER — DIPHENHYDRAMINE HCL 25 MG
25 CAPSULE ORAL
Status: COMPLETED | OUTPATIENT
Start: 2025-02-16 | End: 2025-02-17

## 2025-02-16 RX ADMIN — ONDANSETRON 4 MG: 2 INJECTION INTRAMUSCULAR; INTRAVENOUS at 11:02

## 2025-02-16 RX ADMIN — HYDROMORPHONE HYDROCHLORIDE 2 MG: 1 INJECTION, SOLUTION INTRAMUSCULAR; INTRAVENOUS; SUBCUTANEOUS at 11:02

## 2025-02-16 RX ADMIN — SODIUM CHLORIDE, SODIUM LACTATE, POTASSIUM CHLORIDE, AND CALCIUM CHLORIDE 1000 ML: .6; .31; .03; .02 INJECTION, SOLUTION INTRAVENOUS at 10:02

## 2025-02-16 NOTE — Clinical Note
Diagnosis: Sickle cell pain crisis [968837]   Future Attending Provider: NOAH HERNANDEZ [[id number]]   Is the patient being sent to ED Observation?: No

## 2025-02-17 PROBLEM — J06.9 UPPER RESPIRATORY INFECTION: Status: ACTIVE | Noted: 2025-02-17

## 2025-02-17 LAB
ALBUMIN SERPL BCP-MCNC: 3.6 G/DL (ref 3.5–5.2)
ALP SERPL-CCNC: 95 U/L (ref 40–150)
ALT SERPL W/O P-5'-P-CCNC: 13 U/L (ref 10–44)
ANION GAP SERPL CALC-SCNC: 9 MMOL/L (ref 8–16)
AST SERPL-CCNC: 27 U/L (ref 10–40)
BACTERIA #/AREA URNS AUTO: ABNORMAL /HPF
BASOPHILS # BLD AUTO: 0.1 K/UL (ref 0–0.2)
BASOPHILS # BLD AUTO: 0.1 K/UL (ref 0–0.2)
BASOPHILS NFR BLD: 0.8 % (ref 0–1.9)
BASOPHILS NFR BLD: 0.9 % (ref 0–1.9)
BILIRUB SERPL-MCNC: 0.4 MG/DL (ref 0.1–1)
BILIRUB UR QL STRIP: NEGATIVE
BUN SERPL-MCNC: 13 MG/DL (ref 6–20)
CALCIUM SERPL-MCNC: 9.5 MG/DL (ref 8.7–10.5)
CHLORIDE SERPL-SCNC: 108 MMOL/L (ref 95–110)
CLARITY UR REFRACT.AUTO: ABNORMAL
CO2 SERPL-SCNC: 25 MMOL/L (ref 23–29)
COLOR UR AUTO: YELLOW
CREAT SERPL-MCNC: 1.2 MG/DL (ref 0.5–1.4)
DIFFERENTIAL METHOD BLD: ABNORMAL
DIFFERENTIAL METHOD BLD: ABNORMAL
EOSINOPHIL # BLD AUTO: 0.4 K/UL (ref 0–0.5)
EOSINOPHIL # BLD AUTO: 0.4 K/UL (ref 0–0.5)
EOSINOPHIL NFR BLD: 3.2 % (ref 0–8)
EOSINOPHIL NFR BLD: 3.7 % (ref 0–8)
ERYTHROCYTE [DISTWIDTH] IN BLOOD BY AUTOMATED COUNT: 25.8 % (ref 11.5–14.5)
ERYTHROCYTE [DISTWIDTH] IN BLOOD BY AUTOMATED COUNT: 26.1 % (ref 11.5–14.5)
EST. GFR  (NO RACE VARIABLE): 56.5 ML/MIN/1.73 M^2
GLUCOSE SERPL-MCNC: 138 MG/DL (ref 70–110)
GLUCOSE UR QL STRIP: NEGATIVE
HCT VFR BLD AUTO: 25.9 % (ref 37–48.5)
HCT VFR BLD AUTO: 30.3 % (ref 37–48.5)
HGB BLD-MCNC: 8.3 G/DL (ref 12–16)
HGB BLD-MCNC: 9.6 G/DL (ref 12–16)
HGB UR QL STRIP: NEGATIVE
IMM GRANULOCYTES # BLD AUTO: 0.05 K/UL (ref 0–0.04)
IMM GRANULOCYTES # BLD AUTO: 0.1 K/UL (ref 0–0.04)
IMM GRANULOCYTES NFR BLD AUTO: 0.4 % (ref 0–0.5)
IMM GRANULOCYTES NFR BLD AUTO: 0.9 % (ref 0–0.5)
KETONES UR QL STRIP: NEGATIVE
LEUKOCYTE ESTERASE UR QL STRIP: ABNORMAL
LIPASE SERPL-CCNC: 40 U/L (ref 4–60)
LYMPHOCYTES # BLD AUTO: 2.4 K/UL (ref 1–4.8)
LYMPHOCYTES # BLD AUTO: 3.8 K/UL (ref 1–4.8)
LYMPHOCYTES NFR BLD: 20.8 % (ref 18–48)
LYMPHOCYTES NFR BLD: 29.1 % (ref 18–48)
MCH RBC QN AUTO: 22.5 PG (ref 27–31)
MCH RBC QN AUTO: 22.8 PG (ref 27–31)
MCHC RBC AUTO-ENTMCNC: 31.7 G/DL (ref 32–36)
MCHC RBC AUTO-ENTMCNC: 32 G/DL (ref 32–36)
MCV RBC AUTO: 71 FL (ref 82–98)
MCV RBC AUTO: 71 FL (ref 82–98)
MICROSCOPIC COMMENT: ABNORMAL
MONOCYTES # BLD AUTO: 0.9 K/UL (ref 0.3–1)
MONOCYTES # BLD AUTO: 1.2 K/UL (ref 0.3–1)
MONOCYTES NFR BLD: 7.8 % (ref 4–15)
MONOCYTES NFR BLD: 8.7 % (ref 4–15)
NEUTROPHILS # BLD AUTO: 7.6 K/UL (ref 1.8–7.7)
NEUTROPHILS # BLD AUTO: 7.7 K/UL (ref 1.8–7.7)
NEUTROPHILS NFR BLD: 57.8 % (ref 38–73)
NEUTROPHILS NFR BLD: 65.9 % (ref 38–73)
NITRITE UR QL STRIP: NEGATIVE
NRBC BLD-RTO: 2 /100 WBC
NRBC BLD-RTO: 2 /100 WBC
OHS QRS DURATION: 88 MS
OHS QTC CALCULATION: 470 MS
PH UR STRIP: 6 [PH] (ref 5–8)
PLATELET # BLD AUTO: 427 K/UL (ref 150–450)
PLATELET # BLD AUTO: 508 K/UL (ref 150–450)
PMV BLD AUTO: 9.3 FL (ref 9.2–12.9)
PMV BLD AUTO: 9.3 FL (ref 9.2–12.9)
POTASSIUM SERPL-SCNC: 3.6 MMOL/L (ref 3.5–5.1)
PROT SERPL-MCNC: 7.7 G/DL (ref 6–8.4)
PROT UR QL STRIP: NEGATIVE
RBC # BLD AUTO: 3.64 M/UL (ref 4–5.4)
RBC # BLD AUTO: 4.26 M/UL (ref 4–5.4)
RBC #/AREA URNS AUTO: 3 /HPF (ref 0–4)
RETICS/RBC NFR AUTO: 2.2 % (ref 0.5–2.5)
SODIUM SERPL-SCNC: 142 MMOL/L (ref 136–145)
SP GR UR STRIP: 1.01 (ref 1–1.03)
SQUAMOUS #/AREA URNS AUTO: 10 /HPF
TROPONIN I SERPL DL<=0.01 NG/ML-MCNC: <3 NG/L (ref 0–14)
URN SPEC COLLECT METH UR: ABNORMAL
WBC # BLD AUTO: 11.59 K/UL (ref 3.9–12.7)
WBC # BLD AUTO: 13.15 K/UL (ref 3.9–12.7)
WBC #/AREA URNS AUTO: 10 /HPF (ref 0–5)

## 2025-02-17 PROCEDURE — 85025 COMPLETE CBC W/AUTO DIFF WBC: CPT

## 2025-02-17 PROCEDURE — 63600175 PHARM REV CODE 636 W HCPCS: Mod: JZ,TB

## 2025-02-17 PROCEDURE — 63600175 PHARM REV CODE 636 W HCPCS: Mod: JZ,TB | Performed by: STUDENT IN AN ORGANIZED HEALTH CARE EDUCATION/TRAINING PROGRAM

## 2025-02-17 PROCEDURE — 25000003 PHARM REV CODE 250

## 2025-02-17 PROCEDURE — 11000001 HC ACUTE MED/SURG PRIVATE ROOM

## 2025-02-17 PROCEDURE — 63600175 PHARM REV CODE 636 W HCPCS: Mod: JZ,TB | Performed by: PHYSICIAN ASSISTANT

## 2025-02-17 PROCEDURE — 81001 URINALYSIS AUTO W/SCOPE: CPT | Performed by: PHYSICIAN ASSISTANT

## 2025-02-17 PROCEDURE — 96376 TX/PRO/DX INJ SAME DRUG ADON: CPT

## 2025-02-17 PROCEDURE — 25000003 PHARM REV CODE 250: Performed by: PHYSICIAN ASSISTANT

## 2025-02-17 RX ORDER — GLUCAGON 1 MG
1 KIT INJECTION
Status: DISCONTINUED | OUTPATIENT
Start: 2025-02-17 | End: 2025-02-22 | Stop reason: HOSPADM

## 2025-02-17 RX ORDER — ONDANSETRON HYDROCHLORIDE 2 MG/ML
4 INJECTION, SOLUTION INTRAVENOUS EVERY 8 HOURS PRN
Status: DISCONTINUED | OUTPATIENT
Start: 2025-02-17 | End: 2025-02-22 | Stop reason: HOSPADM

## 2025-02-17 RX ORDER — DIPHENHYDRAMINE HCL 25 MG
25 CAPSULE ORAL EVERY 4 HOURS PRN
Status: DISCONTINUED | OUTPATIENT
Start: 2025-02-17 | End: 2025-02-22 | Stop reason: HOSPADM

## 2025-02-17 RX ORDER — DICYCLOMINE HYDROCHLORIDE 10 MG/1
10 CAPSULE ORAL
Status: DISCONTINUED | OUTPATIENT
Start: 2025-02-17 | End: 2025-02-22 | Stop reason: HOSPADM

## 2025-02-17 RX ORDER — METOCLOPRAMIDE 5 MG/1
5 TABLET ORAL
Status: DISCONTINUED | OUTPATIENT
Start: 2025-02-17 | End: 2025-02-22 | Stop reason: HOSPADM

## 2025-02-17 RX ORDER — HYDROXYUREA 500 MG/1
1000 CAPSULE ORAL DAILY
Status: DISCONTINUED | OUTPATIENT
Start: 2025-02-17 | End: 2025-02-22 | Stop reason: HOSPADM

## 2025-02-17 RX ORDER — HYDROMORPHONE HYDROCHLORIDE 1 MG/ML
1 INJECTION, SOLUTION INTRAMUSCULAR; INTRAVENOUS; SUBCUTANEOUS EVERY 6 HOURS PRN
Status: DISCONTINUED | OUTPATIENT
Start: 2025-02-17 | End: 2025-02-18

## 2025-02-17 RX ORDER — TIZANIDINE 2 MG/1
4 TABLET ORAL EVERY 8 HOURS
Status: DISCONTINUED | OUTPATIENT
Start: 2025-02-17 | End: 2025-02-17

## 2025-02-17 RX ORDER — OXYCODONE HYDROCHLORIDE 10 MG/1
10 TABLET ORAL EVERY 4 HOURS PRN
Refills: 0 | Status: DISCONTINUED | OUTPATIENT
Start: 2025-02-17 | End: 2025-02-18

## 2025-02-17 RX ORDER — FLUOXETINE HYDROCHLORIDE 20 MG/1
40 CAPSULE ORAL DAILY
Status: DISCONTINUED | OUTPATIENT
Start: 2025-02-17 | End: 2025-02-22 | Stop reason: HOSPADM

## 2025-02-17 RX ORDER — POLYETHYLENE GLYCOL 3350 17 G/17G
17 POWDER, FOR SOLUTION ORAL 2 TIMES DAILY
Status: DISCONTINUED | OUTPATIENT
Start: 2025-02-17 | End: 2025-02-22 | Stop reason: HOSPADM

## 2025-02-17 RX ORDER — OXYMETAZOLINE HCL 0.05 %
1 SPRAY, NON-AEROSOL (ML) NASAL
Status: COMPLETED | OUTPATIENT
Start: 2025-02-17 | End: 2025-02-17

## 2025-02-17 RX ORDER — SODIUM CHLORIDE 9 MG/ML
INJECTION, SOLUTION INTRAVENOUS CONTINUOUS
Status: ACTIVE | OUTPATIENT
Start: 2025-02-17 | End: 2025-02-17

## 2025-02-17 RX ORDER — MORPHINE SULFATE 30 MG/1
30 TABLET, FILM COATED, EXTENDED RELEASE ORAL EVERY 8 HOURS
Refills: 0 | Status: DISCONTINUED | OUTPATIENT
Start: 2025-02-17 | End: 2025-02-18

## 2025-02-17 RX ORDER — IPRATROPIUM BROMIDE AND ALBUTEROL SULFATE 2.5; .5 MG/3ML; MG/3ML
3 SOLUTION RESPIRATORY (INHALATION) EVERY 4 HOURS PRN
Status: DISCONTINUED | OUTPATIENT
Start: 2025-02-17 | End: 2025-02-22 | Stop reason: HOSPADM

## 2025-02-17 RX ORDER — SODIUM CHLORIDE 0.9 % (FLUSH) 0.9 %
5 SYRINGE (ML) INJECTION
Status: DISCONTINUED | OUTPATIENT
Start: 2025-02-17 | End: 2025-02-22 | Stop reason: HOSPADM

## 2025-02-17 RX ORDER — GUAIFENESIN 100 MG/5ML
200 SOLUTION ORAL EVERY 4 HOURS PRN
Status: DISCONTINUED | OUTPATIENT
Start: 2025-02-17 | End: 2025-02-20

## 2025-02-17 RX ORDER — ACETAMINOPHEN 500 MG
1000 TABLET ORAL EVERY 8 HOURS
Status: DISCONTINUED | OUTPATIENT
Start: 2025-02-17 | End: 2025-02-22 | Stop reason: HOSPADM

## 2025-02-17 RX ORDER — HYDROMORPHONE HYDROCHLORIDE 1 MG/ML
0.5 INJECTION, SOLUTION INTRAMUSCULAR; INTRAVENOUS; SUBCUTANEOUS EVERY 4 HOURS PRN
Status: DISCONTINUED | OUTPATIENT
Start: 2025-02-17 | End: 2025-02-17

## 2025-02-17 RX ORDER — HYDROMORPHONE HYDROCHLORIDE 1 MG/ML
0.5 INJECTION, SOLUTION INTRAMUSCULAR; INTRAVENOUS; SUBCUTANEOUS ONCE
Status: COMPLETED | OUTPATIENT
Start: 2025-02-17 | End: 2025-02-17

## 2025-02-17 RX ORDER — MORPHINE SULFATE 15 MG/1
30 TABLET, FILM COATED, EXTENDED RELEASE ORAL EVERY 12 HOURS
Refills: 0 | Status: DISCONTINUED | OUTPATIENT
Start: 2025-02-17 | End: 2025-02-17

## 2025-02-17 RX ORDER — NALOXONE HCL 0.4 MG/ML
0.02 VIAL (ML) INJECTION
Status: DISCONTINUED | OUTPATIENT
Start: 2025-02-17 | End: 2025-02-22 | Stop reason: HOSPADM

## 2025-02-17 RX ORDER — AMLODIPINE BESYLATE 10 MG/1
10 TABLET ORAL DAILY
Status: DISCONTINUED | OUTPATIENT
Start: 2025-02-17 | End: 2025-02-22 | Stop reason: HOSPADM

## 2025-02-17 RX ORDER — PANTOPRAZOLE SODIUM 40 MG/1
40 TABLET, DELAYED RELEASE ORAL 2 TIMES DAILY
Status: DISCONTINUED | OUTPATIENT
Start: 2025-02-17 | End: 2025-02-22 | Stop reason: HOSPADM

## 2025-02-17 RX ORDER — FOLIC ACID 1 MG/1
1 TABLET ORAL DAILY
Status: DISCONTINUED | OUTPATIENT
Start: 2025-02-17 | End: 2025-02-22 | Stop reason: HOSPADM

## 2025-02-17 RX ORDER — GABAPENTIN 300 MG/1
600 CAPSULE ORAL 3 TIMES DAILY
Status: DISCONTINUED | OUTPATIENT
Start: 2025-02-17 | End: 2025-02-22 | Stop reason: HOSPADM

## 2025-02-17 RX ORDER — TIZANIDINE 4 MG/1
4 TABLET ORAL EVERY 8 HOURS
Status: DISCONTINUED | OUTPATIENT
Start: 2025-02-17 | End: 2025-02-22 | Stop reason: HOSPADM

## 2025-02-17 RX ORDER — AMOXICILLIN 250 MG
1 CAPSULE ORAL 2 TIMES DAILY PRN
Status: DISCONTINUED | OUTPATIENT
Start: 2025-02-17 | End: 2025-02-22 | Stop reason: HOSPADM

## 2025-02-17 RX ORDER — IBUPROFEN 200 MG
16 TABLET ORAL
Status: DISCONTINUED | OUTPATIENT
Start: 2025-02-17 | End: 2025-02-22 | Stop reason: HOSPADM

## 2025-02-17 RX ORDER — IBUPROFEN 200 MG
24 TABLET ORAL
Status: DISCONTINUED | OUTPATIENT
Start: 2025-02-17 | End: 2025-02-22 | Stop reason: HOSPADM

## 2025-02-17 RX ORDER — SUCRALFATE 1 G/1
1 TABLET ORAL
Status: DISCONTINUED | OUTPATIENT
Start: 2025-02-17 | End: 2025-02-22 | Stop reason: HOSPADM

## 2025-02-17 RX ORDER — HYDROMORPHONE HYDROCHLORIDE 1 MG/ML
2 INJECTION, SOLUTION INTRAMUSCULAR; INTRAVENOUS; SUBCUTANEOUS
Refills: 0 | Status: COMPLETED | OUTPATIENT
Start: 2025-02-17 | End: 2025-02-17

## 2025-02-17 RX ORDER — GABAPENTIN 300 MG/1
600 CAPSULE ORAL 3 TIMES DAILY
Status: DISCONTINUED | OUTPATIENT
Start: 2025-02-17 | End: 2025-02-17

## 2025-02-17 RX ADMIN — ACETAMINOPHEN 1000 MG: 500 TABLET ORAL at 01:02

## 2025-02-17 RX ADMIN — DICYCLOMINE HYDROCHLORIDE 10 MG: 10 CAPSULE ORAL at 04:02

## 2025-02-17 RX ADMIN — OXYCODONE HYDROCHLORIDE 15 MG: 10 TABLET ORAL at 10:02

## 2025-02-17 RX ADMIN — HYDROMORPHONE HYDROCHLORIDE 0.5 MG: 1 INJECTION, SOLUTION INTRAMUSCULAR; INTRAVENOUS; SUBCUTANEOUS at 01:02

## 2025-02-17 RX ADMIN — HYDROMORPHONE HYDROCHLORIDE 1 MG: 1 INJECTION, SOLUTION INTRAMUSCULAR; INTRAVENOUS; SUBCUTANEOUS at 07:02

## 2025-02-17 RX ADMIN — HYDROXYUREA 1000 MG: 500 CAPSULE ORAL at 11:02

## 2025-02-17 RX ADMIN — FOLIC ACID 1 MG: 1 TABLET ORAL at 10:02

## 2025-02-17 RX ADMIN — METOCLOPRAMIDE 5 MG: 5 TABLET ORAL at 03:02

## 2025-02-17 RX ADMIN — PANTOPRAZOLE SODIUM 40 MG: 40 TABLET, DELAYED RELEASE ORAL at 09:02

## 2025-02-17 RX ADMIN — APIXABAN 5 MG: 5 TABLET, FILM COATED ORAL at 08:02

## 2025-02-17 RX ADMIN — TIZANIDINE 4 MG: 2 TABLET ORAL at 01:02

## 2025-02-17 RX ADMIN — GABAPENTIN 600 MG: 300 CAPSULE ORAL at 08:02

## 2025-02-17 RX ADMIN — SUCRALFATE 1 G: 1 TABLET ORAL at 11:02

## 2025-02-17 RX ADMIN — DICYCLOMINE HYDROCHLORIDE 10 MG: 10 CAPSULE ORAL at 10:02

## 2025-02-17 RX ADMIN — GABAPENTIN 600 MG: 300 CAPSULE ORAL at 09:02

## 2025-02-17 RX ADMIN — GABAPENTIN 600 MG: 300 CAPSULE ORAL at 03:02

## 2025-02-17 RX ADMIN — OXYCODONE HYDROCHLORIDE 15 MG: 10 TABLET ORAL at 06:02

## 2025-02-17 RX ADMIN — METOCLOPRAMIDE 5 MG: 5 TABLET ORAL at 11:02

## 2025-02-17 RX ADMIN — ACETAMINOPHEN 1000 MG: 500 TABLET ORAL at 05:02

## 2025-02-17 RX ADMIN — MORPHINE SULFATE 30 MG: 30 TABLET, FILM COATED, EXTENDED RELEASE ORAL at 01:02

## 2025-02-17 RX ADMIN — APIXABAN 5 MG: 5 TABLET, FILM COATED ORAL at 09:02

## 2025-02-17 RX ADMIN — MORPHINE SULFATE 30 MG: 30 TABLET, FILM COATED, EXTENDED RELEASE ORAL at 09:02

## 2025-02-17 RX ADMIN — MORPHINE SULFATE 30 MG: 30 TABLET, FILM COATED, EXTENDED RELEASE ORAL at 03:02

## 2025-02-17 RX ADMIN — SUCRALFATE 1 G: 1 TABLET ORAL at 09:02

## 2025-02-17 RX ADMIN — ACETAMINOPHEN 1000 MG: 500 TABLET ORAL at 09:02

## 2025-02-17 RX ADMIN — FLUOXETINE HYDROCHLORIDE 40 MG: 20 CAPSULE ORAL at 08:02

## 2025-02-17 RX ADMIN — HYDROMORPHONE HYDROCHLORIDE 0.5 MG: 1 INJECTION, SOLUTION INTRAMUSCULAR; INTRAVENOUS; SUBCUTANEOUS at 08:02

## 2025-02-17 RX ADMIN — TIZANIDINE 4 MG: 2 TABLET ORAL at 03:02

## 2025-02-17 RX ADMIN — POLYETHYLENE GLYCOL 3350 17 G: 17 POWDER, FOR SOLUTION ORAL at 08:02

## 2025-02-17 RX ADMIN — Medication 1 SPRAY: at 02:02

## 2025-02-17 RX ADMIN — HYDROMORPHONE HYDROCHLORIDE 0.5 MG: 1 INJECTION, SOLUTION INTRAMUSCULAR; INTRAVENOUS; SUBCUTANEOUS at 07:02

## 2025-02-17 RX ADMIN — SODIUM CHLORIDE: 9 INJECTION, SOLUTION INTRAVENOUS at 10:02

## 2025-02-17 RX ADMIN — ACETAMINOPHEN 650 MG: 325 TABLET ORAL at 12:02

## 2025-02-17 RX ADMIN — GABAPENTIN 600 MG: 300 CAPSULE ORAL at 01:02

## 2025-02-17 RX ADMIN — DICYCLOMINE HYDROCHLORIDE 10 MG: 10 CAPSULE ORAL at 06:02

## 2025-02-17 RX ADMIN — TIZANIDINE 4 MG: 2 TABLET ORAL at 09:02

## 2025-02-17 RX ADMIN — SUCRALFATE 1 G: 1 TABLET ORAL at 04:02

## 2025-02-17 RX ADMIN — DICYCLOMINE HYDROCHLORIDE 10 MG: 10 CAPSULE ORAL at 09:02

## 2025-02-17 RX ADMIN — PANTOPRAZOLE SODIUM 40 MG: 40 TABLET, DELAYED RELEASE ORAL at 08:02

## 2025-02-17 RX ADMIN — OXYCODONE HYDROCHLORIDE 15 MG: 10 TABLET ORAL at 02:02

## 2025-02-17 RX ADMIN — HYDROMORPHONE HYDROCHLORIDE 2 MG: 1 INJECTION, SOLUTION INTRAMUSCULAR; INTRAVENOUS; SUBCUTANEOUS at 01:02

## 2025-02-17 RX ADMIN — SUCRALFATE 1 G: 1 TABLET ORAL at 08:02

## 2025-02-17 RX ADMIN — POLYETHYLENE GLYCOL 3350 17 G: 17 POWDER, FOR SOLUTION ORAL at 09:02

## 2025-02-17 RX ADMIN — DIPHENHYDRAMINE HYDROCHLORIDE 25 MG: 25 CAPSULE ORAL at 12:02

## 2025-02-17 RX ADMIN — AMLODIPINE BESYLATE 10 MG: 10 TABLET ORAL at 08:02

## 2025-02-17 NOTE — ASSESSMENT & PLAN NOTE
Patient's blood pressure range in the last 24 hours was: BP  Min: 155/100  Max: 182/108.The patient's inpatient anti-hypertensive regimen is listed below:  Current Antihypertensives  amLODIPine tablet 10 mg, Daily, Oral    Plan  - BP is controlled, no changes needed to their regimen  - Will control pain as well

## 2025-02-17 NOTE — PHARMACY MED REC
"  Admission Medication History     The home medication history was taken by Radha Benson.    You may go to "Admission" then "Reconcile Home Medications" tabs to review and/or act upon these items.     The home medication list has been updated by the Pharmacy department.   Please read ALL comments highlighted in yellow.   Please address this information as you see fit.    Feel free to contact us if you have any questions or require assistance.      The medications listed below were removed from the home medication list. Please reorder if appropriate:  Patient reports no longer taking the following medication(s):  HYDROXYZINE 25 MG  LACTULOSE 10 GRAM/15 ML SOLN  METOCLOPRAMIDE 5 MG    Medications listed below were obtained from: Patient/family and Analytic software- [employer]  Current Outpatient Medications on File Prior to Encounter   Medication Sig    acetaminophen (TYLENOL) 325 MG tablet Take 2 tablets (650 mg total) by mouth every 8 (eight) hours as needed for Pain.    albuterol (VENTOLIN HFA) 90 mcg/actuation inhaler Inhale 2 puffs into the lungs every 6 (six) hours as needed for Wheezing or Shortness of Breath. Rescue    amLODIPine (NORVASC) 10 MG tablet Take 1 tablet (10 mg total) by mouth once daily.  Patient reported taking - last fill date 07/2024 for 90 day supply    apixaban (ELIQUIS) 5 mg Tab Take 1 tablet (5 mg total) by mouth 2 (two) times daily.  Patient reported taking - last fill date 07/2024 for 30 day supply    ascorbic acid (VITAMIN C ORAL) Take 1 capsule by mouth 2 (two) times a day.    dicyclomine (BENTYL) 10 MG capsule Take 1 capsule (10 mg total) by mouth 4 (four) times daily before meals and nightly.    FLUoxetine 40 MG capsule Take 1 capsule (40 mg total) by mouth once daily.    fluticasone propionate (FLONASE) 50 mcg/actuation nasal spray INSTILL 1 SPRAY INTO EACH NOSTRIL EVERY DAY    folic acid (FOLVITE) 1 MG tablet Take 1 tablet (1 mg total) by mouth once daily.    gabapentin (NEURONTIN) " 300 MG capsule Take 3 capsules by mouth 3 (three) times daily.    HYDROcodone-acetaminophen (NORCO)  mg per tablet Take 1 tablet by mouth every 4 to 6 hours as needed for Pain.    hydroxyurea (HYDREA) 500 mg Cap Take 2 capsules (1,000 mg total) by mouth once daily. (Patient not taking: Reported on 2/17/2025.)    LIDOcaine-prilocaine (EMLA) cream Apply topically as needed.    morphine (MS CONTIN) 30 MG 12 hr tablet Take 30 mg by mouth 2 (two) times daily.    naloxone (NARCAN) 4 mg/actuation Spry 4mg by nasal route as needed for opioid overdose; may repeat every 2-3 minutes in alternating nostrils until medical help arrives. Call 911    pantoprazole (PROTONIX) 40 MG tablet Take 40 mg by mouth once daily.    senna-docusate 8.6-50 mg (PERICOLACE) 8.6-50 mg per tablet Take 1 tablet by mouth daily as needed for Constipation.    sucralfate (CARAFATE) 1 gram tablet Take 1 g by mouth 3 (three) times daily.    tiZANidine (ZANAFLEX) 4 MG tablet Take 4 mg by mouth 2 (two) times a day.           Potential issues to be addressed PRIOR TO DISCHARGE  Patient reported not taking the following medications: (HYDROXYUREA 500 MG). These medications remain on the home medication list. Please address accordingly.   Please discuss with the patient barriers to adherence with medication treatment plans  Patient requires education regarding drug therapies   Patient requested refills for the following medications: (MORPHINE 30 MG)    Radha Benson  EXT [phone]                .

## 2025-02-17 NOTE — H&P
"  [facility] - Emergency Dept  [facility]  History & Physical    Patient Name: Nazanin Malone  MRN: [id number]  Patient Class: OP- Observation  Admission Date: 2/16/2025  Attending Physician: Marquez Mcneal MD   Primary Care Provider: [facility] Primary Doctor         Patient information was obtained from patient, past medical records, and ER records.     Subjective:     Principal Problem:Sickle cell disease, type S beta-zero thalassemia with crisis    Chief Complaint:   Chief Complaint   Patient presents with    Sickle Cell Pain Crisis     Pt has c/o sickle cell pain "all over," productive cough, fever (t max 101 at home), sinus congestion, and sore throat    Flu-like symptoms        HPI: Nazanin Malone is a 44 y.o. female with PMHx of sickle cell beta thalassemia zero, multiple pulmonary emboli on chronic AC, asthma, and HTN presenting with pain and URI. Reports acute pain to the bilateral knees and hips that feels like a sickle cell pain crisis for the last 2 days. She has been taking her gabapentin, tizanadine, and norco without relief.  Of note, she ran out of MS contin a few days ago, around time of symptom onset.  She reports there was an issue with her insurance coverage after a dosing change to the MS contin from BID to TID, though she believes this is now fixed.  She has had upper respiratory symptoms the last couple of days with nasal congestion, sore throat, headache. She has a dry cough and feels slightly SOB from all of the coughing. She had a fever last night 101F.  No chest pain, diarrhea, dysuria. She has chronic issue with abdominal pain nausea/vomiting that she says is from gastritis and she takes Bentyl, Reglan, Carafate for this which helps.  Saw her hematologist last Tuesday. She is able to ambualt despite pain.     ED: hypertensive otherwise HDS afebrile. Hbg 9.6.  Reticulocytes normal  2.2%.T bili normal.  WBC 11k. Plts 508. Lipase normal. CMP unremarkable. Flu and Covid negative. CXR " with no acute cardiopulmonary process. She was given IV hydromorphone 2 mg IV x2, tylenol, benadryl, 1L LR. Admitted to  for management of pain.     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    Asthma     Avascular necrosis of femur     Depression     History of pulmonary embolism     Hypertension     Morbid obesity     Opioid dependence 2017    Pneumonia due to Streptococcus pneumoniae 2017    Right lower lobe pneumonia 2017    Sepsis due to Streptococcus pneumoniae 2017    Sickle cell-beta thalassemia disease with pain     Trigeminal neuralgia        Past Surgical History:   Procedure Laterality Date     SECTION      ESOPHAGOGASTRODUODENOSCOPY N/A 2024    Procedure: EGD (ESOPHAGOGASTRODUODENOSCOPY);  Surgeon: Allen Marshall MD;  Location: [address]);  Service: Gastroenterology;  Laterality: N/A;    TONSILLECTOMY      TUBAL LIGATION         Review of patient's allergies indicates:   Allergen Reactions    Cat dander Anaphylaxis, Itching and Shortness Of Breath    Nsaids (non-steroidal anti-inflammatory drug) Itching and Anaphylaxis    Latex Rash       No current facility-administered medications on file prior to encounter.     Current Outpatient Medications on File Prior to Encounter   Medication Sig    acetaminophen (TYLENOL) 325 MG tablet Take 2 tablets (650 mg total) by mouth every 8 (eight) hours as needed for Pain.    albuterol (VENTOLIN HFA) 90 mcg/actuation inhaler Inhale 2 puffs into the lungs every 6 (six) hours as needed for Wheezing or Shortness of Breath. Rescue    amLODIPine (NORVASC) 10 MG tablet Take 1 tablet (10 mg total) by mouth once daily.    apixaban (ELIQUIS) 5 mg Tab Take 1 tablet (5 mg total) by mouth 2 (two) times daily.    ascorbic acid (VITAMIN C ORAL) Take 1 capsule by mouth once daily.    dicyclomine (BENTYL) 10 MG capsule Take 1  capsule (10 mg total) by mouth 4 (four) times daily before meals and nightly.    FLUoxetine 40 MG capsule Take 1 capsule (40 mg total) by mouth once daily.    fluticasone propionate (FLONASE) 50 mcg/actuation nasal spray INSTILL 1 SPRAY INTO EACH NOSTRIL EVERY DAY    folic acid (FOLVITE) 1 MG tablet Take 1 tablet (1 mg total) by mouth once daily.    gabapentin (NEURONTIN) 300 MG capsule Take 2 capsules (600 mg total) by mouth 3 (three) times daily.    HYDROcodone-acetaminophen (NORCO)  mg per tablet Take 1 tablet by mouth every 4 (four) hours as needed for Pain.    hydroxyurea (HYDREA) 500 mg Cap Take 2 capsules (1,000 mg total) by mouth once daily.    hydrOXYzine pamoate (VISTARIL) 25 MG Cap Take 1 capsule (25 mg total) by mouth every 4 (four) hours as needed (Anxiety).    lactulose (CHRONULAC) 10 gram/15 mL solution     LIDOcaine-prilocaine (EMLA) cream Apply topically as needed.    metoclopramide HCl (REGLAN) 5 MG tablet Take 1 tablet (5 mg total) by mouth 3 (three) times daily before meals.    morphine (MS CONTIN) 30 MG 12 hr tablet Take 1 tablet (30 mg total) by mouth every 8 (eight) hours.    naloxone (NARCAN) 4 mg/actuation Spry 4mg by nasal route as needed for opioid overdose; may repeat every 2-3 minutes in alternating nostrils until medical help arrives. Call 911    pantoprazole (PROTONIX) 40 MG tablet Take 1 tablet (40 mg total) by mouth 2 (two) times daily.    senna-docusate 8.6-50 mg (PERICOLACE) 8.6-50 mg per tablet Take 1 tablet by mouth daily as needed for Constipation.    sucralfate (CARAFATE) 1 gram tablet Take 1 tablet (1 g total) by mouth 4 (four) times daily before meals and nightly.    tiZANidine (ZANAFLEX) 4 MG tablet Take 1 tablet (4 mg total) by mouth every 8 (eight) hours. for 10 days     Family History       Problem Relation (Age of Onset)    Diabetes Mother    Heart disease Mother, Father          Tobacco Use    Smoking status: Never    Smokeless tobacco: Never   Substance and  Sexual Activity    Alcohol use: No    Drug use: Yes     Types: Marijuana     Comment: periodically     Sexual activity: Not Currently     Birth control/protection: See Surgical Hx     Review of Systems   Constitutional:  Positive for fever. Negative for chills.   HENT:  Positive for congestion and sore throat. Negative for trouble swallowing.    Eyes:  Negative for photophobia and visual disturbance.   Respiratory:  Positive for cough and shortness of breath. Negative for wheezing.    Cardiovascular:  Negative for chest pain, palpitations and leg swelling.   Gastrointestinal:  Positive for abdominal pain, nausea and vomiting. Negative for constipation and diarrhea.   Genitourinary:  Negative for dysuria and hematuria.   Musculoskeletal:  Positive for arthralgias. Negative for back pain, gait problem and joint swelling.   Skin:  Negative for rash and wound.   Neurological:  Positive for headaches. Negative for weakness, light-headedness and numbness.   Psychiatric/Behavioral:  Negative for agitation and confusion.      Objective:     Vital Signs (Most Recent):  Temp: 98.2 °F (36.8 °C) (02/17/25 0316)  Pulse: 88 (02/17/25 0316)  Resp: 18 (02/17/25 0344)  BP: (!) 155/100 (02/17/25 0316)  SpO2: 96 % (02/17/25 0316) Vital Signs (24h Range):  Temp:  [98.2 °F (36.8 °C)-99.6 °F (37.6 °C)] 98.2 °F (36.8 °C)  Pulse:  [] 88  Resp:  [14-20] 18  SpO2:  [96 %-99 %] 96 %  BP: (155-182)/(100-108) 155/100     Weight: 103.1 kg (227 lb 4.7 oz)  Body mass index is 41.57 kg/m².     Physical Exam  Vitals and nursing note reviewed.   Constitutional:       General: She is not in acute distress.  HENT:      Head: Normocephalic and atraumatic.      Nose: Nose normal.      Mouth/Throat:      Pharynx: No oropharyngeal exudate.   Eyes:      Extraocular Movements: Extraocular movements intact.      Conjunctiva/sclera: Conjunctivae normal.   Cardiovascular:      Rate and Rhythm: Normal rate and regular rhythm.      Heart sounds: Normal  heart sounds.   Pulmonary:      Effort: Pulmonary effort is normal. No respiratory distress.      Breath sounds: Normal breath sounds.   Abdominal:      General: Bowel sounds are normal. There is no distension.      Palpations: Abdomen is soft.      Tenderness: There is abdominal tenderness. There is no guarding.   Musculoskeletal:         General: No swelling or tenderness. Normal range of motion.      Cervical back: Normal range of motion.      Right lower leg: No edema.      Left lower leg: No edema.   Skin:     General: Skin is warm and dry.   Neurological:      General: No focal deficit present.      Mental Status: She is alert and oriented to person, place, and time.   Psychiatric:         Mood and Affect: Mood normal.         Thought Content: Thought content normal.                Significant Labs: All pertinent labs within the past 24 hours have been reviewed.  CBC:   Recent Labs   Lab 02/16/25 2359 02/17/25  0527   WBC 11.59 13.15*   HGB 9.6* 8.3*   HCT 30.3* 25.9*   * 427     CMP:   Recent Labs   Lab 02/16/25  2359      K 3.6      CO2 25   *   BUN 13   CREATININE 1.2   CALCIUM 9.5   PROT 7.7   ALBUMIN 3.6   BILITOT 0.4   ALKPHOS 95   AST 27   ALT 13   ANIONGAP 9     Lipase:   Recent Labs   Lab 02/16/25  2359   LIPASE 40     Troponin:   Recent Labs   Lab 02/16/25 2359   TROPONINIHS <3     Significant Imaging: I have reviewed all pertinent imaging results/findings within the past 24 hours.  X-Ray Chest PA And Lateral  Narrative: EXAMINATION:  XR CHEST PA AND LATERAL    CLINICAL HISTORY:  Chest pain, unspecified    TECHNIQUE:  PA and lateral views of the chest were performed.    COMPARISON:  02/02/2025.    FINDINGS:  Port catheter tip overlies the SVC.  Mild elevation right hemidiaphragm, similar compared to prior.    There is no consolidation, effusion, or pneumothorax.    Cardiomediastinal silhouette is unremarkable.    Regional osseous structures are unremarkable.  Impression:  No acute cardiopulmonary process.    Electronically signed by: Tarik Mata MD  Date:    02/17/2025  Time:    00:29       Assessment/Plan:     * Sickle cell disease, type S beta-zero thalassemia with crisis  - Presenting with acute bilateral hip and knee pain & URI  - Retic count normal. TB normal.  Hbg 9.6.   - CXR without consolidation.Stable on room air. Flu/covid negative.   - Given 2 mg IV dilaudid in ED without relief of pain. Also given 1 L fluids   - Continue home gabapentin, tizanidine  - Continue home hydroxyurea and folic acid  - Restart home MS contin 30 mg (recently increased from Q12h to Q8h per pt) - she has been out for a few days which is likely exacerbating her current pain  - Change home norco 10 mg q4h prn to scheduled tylenol 1 g q8h with oxycodone 10 mg q4h for moderate pain, 15 mg q4h for severe pain, and 0.5 mg IV hydromorphone breakthrough   - Encourage ambulation , IS    Upper respiratory infection  2 days cough, congestion, HA, fever  - Flu/Covid negative  - Stable on room air   - CXR without consolidation  - Supportive care  - Monitor CBC     Abdominal pain  - Chronic, stable   - Continue home Bentyl, Carafate, Reglan     Chronic gastritis  Continue home PPI and carafate     Anemia  Anemia is likely due to sickle cell-beta thalassemia . Most recent hemoglobin and hematocrit are listed below.  Recent Labs     02/16/25  2359 02/17/25  0527   HGB 9.6* 8.3*   HCT 30.3* 25.9*   Plan  - Monitor serial CBC: Daily  - Transfuse PRBC if patient becomes hemodynamically unstable, symptomatic or H/H drops below 7/21.  - Patient has not received any PRBC transfusions to date  - Patient's anemia is currently stable    History of pulmonary embolism  - Continue home eliquis 5 mg BID    Anxiety and depression  - Continue home fluoxetine    Hypertension  Patient's blood pressure range in the last 24 hours was: BP  Min: 155/100  Max: 182/108.The patient's inpatient anti-hypertensive regimen is listed  below:  Current Antihypertensives  amLODIPine tablet 10 mg, Daily, Oral    Plan  - BP is controlled, no changes needed to their regimen  - Will control pain as well      VTE Risk Mitigation (From admission, onward)           Ordered     IP VTE HIGH RISK PATIENT  Once         02/17/25 0236     Place sequential compression device  Until discontinued         02/17/25 0236     Reason for No Pharmacological VTE Prophylaxis  Once        Question:  Reasons:  Answer:  Already adequately anticoagulated on oral Anticoagulants    02/17/25 0236                         On 02/17/2025, patient should be placed in hospital observation services under my care in collaboration with DO. Sussy Tolbert PA-C  Department of Hospital Medicine  [facility] - Emergency Dept

## 2025-02-17 NOTE — ASSESSMENT & PLAN NOTE
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 Most recent hemoglobin and hematocrit are listed below.  Recent Labs     02/16/25  2359 02/17/25  0527   HGB 9.6* 8.3*   HCT 30.3* 25.9*   Plan  - Monitor serial CBC: Daily  - Transfuse PRBC if patient becomes hemodynamically unstable, symptomatic or H/H drops below 7/21.  - Patient has not received any PRBC transfusions to date  - Patient's anemia is currently stable

## 2025-02-17 NOTE — HPI
Nazanin Malone is a 44 y.o. female with PMHx of sickle cell beta thalassemia zero, multiple pulmonary emboli on chronic AC, asthma, and HTN presenting with pain and URI. Reports acute pain to the bilateral knees and hips that feels like a sickle cell pain crisis for the last 2 days. She has been taking her gabapentin, tizanadine, and norco without relief.  Of note, she ran out of MS contin a few days ago, around time of symptom onset.  She reports there was an issue with her insurance coverage after a dosing change to the MS contin from BID to TID, though she believes this is now fixed.  She has had upper respiratory symptoms the last couple of days with nasal congestion, sore throat, headache. She has a dry cough and feels slightly SOB from all of the coughing. She had a fever last night 101F.  No chest pain, diarrhea, dysuria. She has chronic issue with abdominal pain nausea/vomiting that she says is from gastritis and she takes Bentyl, Reglan, Carafate for this which helps.  Saw her hematologist last Tuesday. She is able to ambualt despite pain.     ED: hypertensive otherwise HDS afebrile. Hbg 9.6.  Reticulocytes normal  2.2%.T bili normal.  WBC 11k. Plts 508. Lipase normal. CMP unremarkable. Flu and Covid negative. CXR with no acute cardiopulmonary process. She was given IV hydromorphone 2 mg IV x2, tylenol, benadryl, 1L LR. Admitted to  for management of pain.

## 2025-02-17 NOTE — PLAN OF CARE
I assume care of this patient and agree with findings as dictated in Sussy Aleman PA-C note.  44F presenting with sickle cell pain crisis.  Initial good rapport with patient but after her insisting specifically Dilaudid 2 mg, I explained the high degree of oral morphine equivalents she was already receiving, and patient became extremely displeased, demanding new physician. Explanations that patient was somnolent and ongoing concern for central nervous system depression did not alter patient's perspective.  Will get patient advocacy involved as patient requesting new physician versus demanding discharge. Will follow up with team in regards to patient placement, etc.        Marquez Mcneal MD  [facility]

## 2025-02-17 NOTE — SUBJECTIVE & OBJECTIVE
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    Asthma     Avascular necrosis of femur     Depression     History of pulmonary embolism     Hypertension     Morbid obesity     Opioid dependence 2017    Pneumonia due to Streptococcus pneumoniae 2017    Right lower lobe pneumonia 2017    Sepsis due to Streptococcus pneumoniae 2017    Sickle cell-beta thalassemia disease with pain     Trigeminal neuralgia        Past Surgical History:   Procedure Laterality Date     SECTION      ESOPHAGOGASTRODUODENOSCOPY N/A 2024    Procedure: EGD (ESOPHAGOGASTRODUODENOSCOPY);  Surgeon: Allen Marshall MD;  Location: [address]);  Service: Gastroenterology;  Laterality: N/A;    TONSILLECTOMY      TUBAL LIGATION         Review of patient's allergies indicates:   Allergen Reactions    Cat dander Anaphylaxis, Itching and Shortness Of Breath    Nsaids (non-steroidal anti-inflammatory drug) Itching and Anaphylaxis    Latex Rash       No current facility-administered medications on file prior to encounter.     Current Outpatient Medications on File Prior to Encounter   Medication Sig    acetaminophen (TYLENOL) 325 MG tablet Take 2 tablets (650 mg total) by mouth every 8 (eight) hours as needed for Pain.    albuterol (VENTOLIN HFA) 90 mcg/actuation inhaler Inhale 2 puffs into the lungs every 6 (six) hours as needed for Wheezing or Shortness of Breath. Rescue    amLODIPine (NORVASC) 10 MG tablet Take 1 tablet (10 mg total) by mouth once daily.    apixaban (ELIQUIS) 5 mg Tab Take 1 tablet (5 mg total) by mouth 2 (two) times daily.    ascorbic acid (VITAMIN C ORAL) Take 1 capsule by mouth once daily.    dicyclomine (BENTYL) 10 MG capsule Take 1 capsule (10 mg total) by mouth 4 (four) times daily before meals and nightly.    FLUoxetine 40 MG capsule Take 1 capsule (40 mg total) by mouth once  daily.    fluticasone propionate (FLONASE) 50 mcg/actuation nasal spray INSTILL 1 SPRAY INTO EACH NOSTRIL EVERY DAY    folic acid (FOLVITE) 1 MG tablet Take 1 tablet (1 mg total) by mouth once daily.    gabapentin (NEURONTIN) 300 MG capsule Take 2 capsules (600 mg total) by mouth 3 (three) times daily.    HYDROcodone-acetaminophen (NORCO)  mg per tablet Take 1 tablet by mouth every 4 (four) hours as needed for Pain.    hydroxyurea (HYDREA) 500 mg Cap Take 2 capsules (1,000 mg total) by mouth once daily.    hydrOXYzine pamoate (VISTARIL) 25 MG Cap Take 1 capsule (25 mg total) by mouth every 4 (four) hours as needed (Anxiety).    lactulose (CHRONULAC) 10 gram/15 mL solution     LIDOcaine-prilocaine (EMLA) cream Apply topically as needed.    metoclopramide HCl (REGLAN) 5 MG tablet Take 1 tablet (5 mg total) by mouth 3 (three) times daily before meals.    morphine (MS CONTIN) 30 MG 12 hr tablet Take 1 tablet (30 mg total) by mouth every 8 (eight) hours.    naloxone (NARCAN) 4 mg/actuation Spry 4mg by nasal route as needed for opioid overdose; may repeat every 2-3 minutes in alternating nostrils until medical help arrives. Call 911    pantoprazole (PROTONIX) 40 MG tablet Take 1 tablet (40 mg total) by mouth 2 (two) times daily.    senna-docusate 8.6-50 mg (PERICOLACE) 8.6-50 mg per tablet Take 1 tablet by mouth daily as needed for Constipation.    sucralfate (CARAFATE) 1 gram tablet Take 1 tablet (1 g total) by mouth 4 (four) times daily before meals and nightly.    tiZANidine (ZANAFLEX) 4 MG tablet Take 1 tablet (4 mg total) by mouth every 8 (eight) hours. for 10 days     Family History       Problem Relation (Age of Onset)    Diabetes Mother    Heart disease Mother, Father          Tobacco Use    Smoking status: Never    Smokeless tobacco: Never   Substance and Sexual Activity    Alcohol use: No    Drug use: Yes     Types: Marijuana     Comment: periodically     Sexual activity: Not Currently     Birth  control/protection: See Surgical Hx     Review of Systems   Constitutional:  Positive for fever. Negative for chills.   HENT:  Positive for congestion and sore throat. Negative for trouble swallowing.    Eyes:  Negative for photophobia and visual disturbance.   Respiratory:  Positive for cough and shortness of breath. Negative for wheezing.    Cardiovascular:  Negative for chest pain, palpitations and leg swelling.   Gastrointestinal:  Positive for abdominal pain, nausea and vomiting. Negative for constipation and diarrhea.   Genitourinary:  Negative for dysuria and hematuria.   Musculoskeletal:  Positive for arthralgias. Negative for back pain, gait problem and joint swelling.   Skin:  Negative for rash and wound.   Neurological:  Positive for headaches. Negative for weakness, light-headedness and numbness.   Psychiatric/Behavioral:  Negative for agitation and confusion.      Objective:     Vital Signs (Most Recent):  Temp: 98.2 °F (36.8 °C) (02/17/25 0316)  Pulse: 88 (02/17/25 0316)  Resp: 18 (02/17/25 0344)  BP: (!) 155/100 (02/17/25 0316)  SpO2: 96 % (02/17/25 0316) Vital Signs (24h Range):  Temp:  [98.2 °F (36.8 °C)-99.6 °F (37.6 °C)] 98.2 °F (36.8 °C)  Pulse:  [] 88  Resp:  [14-20] 18  SpO2:  [96 %-99 %] 96 %  BP: (155-182)/(100-108) 155/100     Weight: 103.1 kg (227 lb 4.7 oz)  Body mass index is 41.57 kg/m².     Physical Exam  Vitals and nursing note reviewed.   Constitutional:       General: She is not in acute distress.  HENT:      Head: Normocephalic and atraumatic.      Nose: Nose normal.      Mouth/Throat:      Pharynx: No oropharyngeal exudate.   Eyes:      Extraocular Movements: Extraocular movements intact.      Conjunctiva/sclera: Conjunctivae normal.   Cardiovascular:      Rate and Rhythm: Normal rate and regular rhythm.      Heart sounds: Normal heart sounds.   Pulmonary:      Effort: Pulmonary effort is normal. No respiratory distress.      Breath sounds: Normal breath sounds.   Abdominal:       General: Bowel sounds are normal. There is no distension.      Palpations: Abdomen is soft.      Tenderness: There is abdominal tenderness. There is no guarding.   Musculoskeletal:         General: No swelling or tenderness. Normal range of motion.      Cervical back: Normal range of motion.      Right lower leg: No edema.      Left lower leg: No edema.   Skin:     General: Skin is warm and dry.   Neurological:      General: No focal deficit present.      Mental Status: She is alert and oriented to person, place, and time.   Psychiatric:         Mood and Affect: Mood normal.         Thought Content: Thought content normal.                Significant Labs: All pertinent labs within the past 24 hours have been reviewed.  CBC:   Recent Labs   Lab 02/16/25 2359 02/17/25  0527   WBC 11.59 13.15*   HGB 9.6* 8.3*   HCT 30.3* 25.9*   * 427     CMP:   Recent Labs   Lab 02/16/25 2359      K 3.6      CO2 25   *   BUN 13   CREATININE 1.2   CALCIUM 9.5   PROT 7.7   ALBUMIN 3.6   BILITOT 0.4   ALKPHOS 95   AST 27   ALT 13   ANIONGAP 9     Lipase:   Recent Labs   Lab 02/16/25  2359   LIPASE 40     Troponin:   Recent Labs   Lab 02/16/25 2359   TROPONINIHS <3     Significant Imaging: I have reviewed all pertinent imaging results/findings within the past 24 hours.  X-Ray Chest PA And Lateral  Narrative: EXAMINATION:  XR CHEST PA AND LATERAL    CLINICAL HISTORY:  Chest pain, unspecified    TECHNIQUE:  PA and lateral views of the chest were performed.    COMPARISON:  02/02/2025.    FINDINGS:  Port catheter tip overlies the SVC.  Mild elevation right hemidiaphragm, similar compared to prior.    There is no consolidation, effusion, or pneumothorax.    Cardiomediastinal silhouette is unremarkable.    Regional osseous structures are unremarkable.  Impression: No acute cardiopulmonary process.    Electronically signed by: Tarik Mata MD  Date:    02/17/2025  Time:    00:29

## 2025-02-17 NOTE — ASSESSMENT & PLAN NOTE
2 days cough, congestion, HA, fever  - Flu/Covid negative  - Stable on room air   - CXR without consolidation  - Supportive care  - Monitor CBC

## 2025-02-17 NOTE — PLAN OF CARE
Inpatient Upgrade Note    Nazanin Malone has warranted treatment spanning two or more midnights of hospital level care for the management of Sickle Cell Pain Crisis . She continues to require monitoring of vital signs and IV pain medication. Her condition is also complicated by the following comorbidities: Upper respiratory infection & Anemia.

## 2025-02-17 NOTE — PLAN OF CARE
After thorough discussion with team and administration, including thorough review of patient's presentation and treatment plan, I will remain attending primary physician for this patient and current treatment plan medically appropriate.  Will have ongoing attempts to retain good rapport, assure patient of the benefits associated with treatment plan as well as avoidance of severe risks, and will remain in care of this patient unless deemed irreparable fracture in patient/provider relationship.            Marquez Mcneal MD  [facility] Medicine

## 2025-02-17 NOTE — ED NOTES
"Pt stating "I feel like no one is advocating for me. I just want to leave." Elizabet MARTI notified and reports will round on patient as soon as possible.   "

## 2025-02-17 NOTE — ASSESSMENT & PLAN NOTE
- Presenting with acute bilateral hip and knee pain & URI  - Retic count normal. TB normal.  Hbg 9.6.   - CXR without consolidation.Stable on room air. Flu/covid negative.   - Given 2 mg IV dilaudid in ED without relief of pain. Also given 1 L fluids   - Continue home gabapentin, tizanidine  - Continue home hydroxyurea and folic acid  - Restart home MS contin 30 mg (recently increased from Q12h to Q8h per pt) - she has been out for a few days which is likely exacerbating her current pain  - Change home norco 10 mg q4h prn to scheduled tylenol 1 g q8h with oxycodone 10 mg q4h for moderate pain, 15 mg q4h for severe pain, and 0.5 mg IV hydromorphone breakthrough   - Encourage ambulation , IS

## 2025-02-17 NOTE — ED TRIAGE NOTES
"Nazanin Malone, a 44 y.o. female presents to the ED w/ complaint of flue like symptoms and joint pain.    Triage note:  Chief Complaint   Patient presents with    Sickle Cell Pain Crisis     Pt has c/o sickle cell pain "all over," productive cough, fever (t max 101 at home), sinus congestion, and sore throat    Flu-like symptoms     Review of patient's allergies indicates:   Allergen Reactions    Cat dander Anaphylaxis, Itching and Shortness Of Breath    Nsaids (non-steroidal anti-inflammatory drug) Itching and Anaphylaxis    Latex Rash     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/2017    Asthma     Avascular necrosis of femur     Depression     History of pulmonary embolism     Hypertension     Morbid obesity     Opioid dependence 04/16/2017    Pneumonia due to Streptococcus pneumoniae 04/25/2017    Right lower lobe pneumonia 04/29/2017    Sepsis due to Streptococcus pneumoniae 04/25/2017    Sickle cell-beta thalassemia disease with pain     Trigeminal neuralgia       "

## 2025-02-18 LAB
ALBUMIN SERPL BCP-MCNC: 3.2 G/DL (ref 3.5–5.2)
ALP SERPL-CCNC: 89 U/L (ref 40–150)
ALT SERPL W/O P-5'-P-CCNC: 11 U/L (ref 10–44)
ANION GAP SERPL CALC-SCNC: 7 MMOL/L (ref 8–16)
AST SERPL-CCNC: 24 U/L (ref 10–40)
BASOPHILS # BLD AUTO: 0.07 K/UL (ref 0–0.2)
BASOPHILS NFR BLD: 0.7 % (ref 0–1.9)
BILIRUB SERPL-MCNC: 0.4 MG/DL (ref 0.1–1)
BILIRUB UR QL STRIP: NEGATIVE
BUN SERPL-MCNC: 16 MG/DL (ref 6–20)
CALCIUM SERPL-MCNC: 9.4 MG/DL (ref 8.7–10.5)
CHLORIDE SERPL-SCNC: 107 MMOL/L (ref 95–110)
CLARITY UR REFRACT.AUTO: CLEAR
CO2 SERPL-SCNC: 28 MMOL/L (ref 23–29)
COLOR UR AUTO: COLORLESS
CREAT SERPL-MCNC: 1 MG/DL (ref 0.5–1.4)
CRP SERPL-MCNC: 9.1 MG/L (ref 0–8.2)
DIFFERENTIAL METHOD BLD: ABNORMAL
EOSINOPHIL # BLD AUTO: 0.5 K/UL (ref 0–0.5)
EOSINOPHIL NFR BLD: 4.7 % (ref 0–8)
ERYTHROCYTE [DISTWIDTH] IN BLOOD BY AUTOMATED COUNT: 25.5 % (ref 11.5–14.5)
EST. GFR  (NO RACE VARIABLE): >60 ML/MIN/1.73 M^2
GLUCOSE SERPL-MCNC: 107 MG/DL (ref 70–110)
GLUCOSE UR QL STRIP: NEGATIVE
HCT VFR BLD AUTO: 26.4 % (ref 37–48.5)
HGB BLD-MCNC: 8.5 G/DL (ref 12–16)
HGB UR QL STRIP: NEGATIVE
IMM GRANULOCYTES # BLD AUTO: 0.03 K/UL (ref 0–0.04)
IMM GRANULOCYTES NFR BLD AUTO: 0.3 % (ref 0–0.5)
KETONES UR QL STRIP: NEGATIVE
LEUKOCYTE ESTERASE UR QL STRIP: NEGATIVE
LYMPHOCYTES # BLD AUTO: 3.9 K/UL (ref 1–4.8)
LYMPHOCYTES NFR BLD: 38.3 % (ref 18–48)
MCH RBC QN AUTO: 22.8 PG (ref 27–31)
MCHC RBC AUTO-ENTMCNC: 32.2 G/DL (ref 32–36)
MCV RBC AUTO: 71 FL (ref 82–98)
MONOCYTES # BLD AUTO: 0.9 K/UL (ref 0.3–1)
MONOCYTES NFR BLD: 9.1 % (ref 4–15)
NEUTROPHILS # BLD AUTO: 4.8 K/UL (ref 1.8–7.7)
NEUTROPHILS NFR BLD: 46.9 % (ref 38–73)
NITRITE UR QL STRIP: NEGATIVE
NRBC BLD-RTO: 3 /100 WBC
PH UR STRIP: 6 [PH] (ref 5–8)
PLATELET # BLD AUTO: 481 K/UL (ref 150–450)
PMV BLD AUTO: 9.4 FL (ref 9.2–12.9)
POTASSIUM SERPL-SCNC: 4.2 MMOL/L (ref 3.5–5.1)
PROCALCITONIN SERPL IA-MCNC: 0.03 NG/ML
PROT SERPL-MCNC: 7 G/DL (ref 6–8.4)
PROT UR QL STRIP: NEGATIVE
RBC # BLD AUTO: 3.72 M/UL (ref 4–5.4)
SODIUM SERPL-SCNC: 142 MMOL/L (ref 136–145)
SP GR UR STRIP: 1.01 (ref 1–1.03)
TROPONIN I SERPL DL<=0.01 NG/ML-MCNC: 5 NG/L (ref 0–14)
URN SPEC COLLECT METH UR: ABNORMAL
WBC # BLD AUTO: 10.2 K/UL (ref 3.9–12.7)

## 2025-02-18 PROCEDURE — 84484 ASSAY OF TROPONIN QUANT: CPT | Performed by: HOSPITALIST

## 2025-02-18 PROCEDURE — 84145 PROCALCITONIN (PCT): CPT

## 2025-02-18 PROCEDURE — 81003 URINALYSIS AUTO W/O SCOPE: CPT | Performed by: STUDENT IN AN ORGANIZED HEALTH CARE EDUCATION/TRAINING PROGRAM

## 2025-02-18 PROCEDURE — 85025 COMPLETE CBC W/AUTO DIFF WBC: CPT

## 2025-02-18 PROCEDURE — 86140 C-REACTIVE PROTEIN: CPT

## 2025-02-18 PROCEDURE — 25000003 PHARM REV CODE 250

## 2025-02-18 PROCEDURE — 80053 COMPREHEN METABOLIC PANEL: CPT

## 2025-02-18 PROCEDURE — 63600175 PHARM REV CODE 636 W HCPCS: Mod: JZ,TB | Performed by: STUDENT IN AN ORGANIZED HEALTH CARE EDUCATION/TRAINING PROGRAM

## 2025-02-18 PROCEDURE — 25000003 PHARM REV CODE 250: Performed by: STUDENT IN AN ORGANIZED HEALTH CARE EDUCATION/TRAINING PROGRAM

## 2025-02-18 PROCEDURE — 11000001 HC ACUTE MED/SURG PRIVATE ROOM

## 2025-02-18 RX ORDER — TIZANIDINE 4 MG/1
4 TABLET ORAL EVERY 8 HOURS
Qty: 30 TABLET | Refills: 0 | Status: SHIPPED | OUTPATIENT
Start: 2025-02-18 | End: 2025-02-22

## 2025-02-18 RX ORDER — MUPIROCIN 20 MG/G
OINTMENT TOPICAL 2 TIMES DAILY
Status: DISCONTINUED | OUTPATIENT
Start: 2025-02-18 | End: 2025-02-22 | Stop reason: HOSPADM

## 2025-02-18 RX ORDER — MORPHINE SULFATE 1 MG/ML
INJECTION INTRAVENOUS CONTINUOUS
Refills: 0 | Status: DISCONTINUED | OUTPATIENT
Start: 2025-02-18 | End: 2025-02-22 | Stop reason: HOSPADM

## 2025-02-18 RX ORDER — NALOXONE HCL 0.4 MG/ML
0.02 VIAL (ML) INJECTION
Status: DISCONTINUED | OUTPATIENT
Start: 2025-02-18 | End: 2025-02-18

## 2025-02-18 RX ADMIN — FOLIC ACID 1 MG: 1 TABLET ORAL at 09:02

## 2025-02-18 RX ADMIN — METOCLOPRAMIDE 5 MG: 5 TABLET ORAL at 06:02

## 2025-02-18 RX ADMIN — FLUOXETINE HYDROCHLORIDE 40 MG: 20 CAPSULE ORAL at 09:02

## 2025-02-18 RX ADMIN — GABAPENTIN 600 MG: 300 CAPSULE ORAL at 09:02

## 2025-02-18 RX ADMIN — SUCRALFATE 1 G: 1 TABLET ORAL at 04:02

## 2025-02-18 RX ADMIN — ACETAMINOPHEN 1000 MG: 500 TABLET ORAL at 10:02

## 2025-02-18 RX ADMIN — Medication: at 11:02

## 2025-02-18 RX ADMIN — OXYCODONE HYDROCHLORIDE 15 MG: 10 TABLET ORAL at 03:02

## 2025-02-18 RX ADMIN — MUPIROCIN: 20 OINTMENT TOPICAL at 09:02

## 2025-02-18 RX ADMIN — DICYCLOMINE HYDROCHLORIDE 10 MG: 10 CAPSULE ORAL at 10:02

## 2025-02-18 RX ADMIN — METOCLOPRAMIDE 5 MG: 5 TABLET ORAL at 04:02

## 2025-02-18 RX ADMIN — HYDROXYUREA 1000 MG: 500 CAPSULE ORAL at 09:02

## 2025-02-18 RX ADMIN — APIXABAN 5 MG: 5 TABLET, FILM COATED ORAL at 09:02

## 2025-02-18 RX ADMIN — PANTOPRAZOLE SODIUM 40 MG: 40 TABLET, DELAYED RELEASE ORAL at 08:02

## 2025-02-18 RX ADMIN — Medication: at 10:02

## 2025-02-18 RX ADMIN — SUCRALFATE 1 G: 1 TABLET ORAL at 08:02

## 2025-02-18 RX ADMIN — DICYCLOMINE HYDROCHLORIDE 10 MG: 10 CAPSULE ORAL at 04:02

## 2025-02-18 RX ADMIN — DICYCLOMINE HYDROCHLORIDE 10 MG: 10 CAPSULE ORAL at 06:02

## 2025-02-18 RX ADMIN — Medication: at 03:02

## 2025-02-18 RX ADMIN — TIZANIDINE 4 MG: 2 TABLET ORAL at 01:02

## 2025-02-18 RX ADMIN — HYDROMORPHONE HYDROCHLORIDE 1 MG: 1 INJECTION, SOLUTION INTRAMUSCULAR; INTRAVENOUS; SUBCUTANEOUS at 04:02

## 2025-02-18 RX ADMIN — SUCRALFATE 1 G: 1 TABLET ORAL at 06:02

## 2025-02-18 RX ADMIN — AMLODIPINE BESYLATE 10 MG: 10 TABLET ORAL at 09:02

## 2025-02-18 RX ADMIN — ACETAMINOPHEN 1000 MG: 500 TABLET ORAL at 06:02

## 2025-02-18 RX ADMIN — MORPHINE SULFATE 30 MG: 30 TABLET, FILM COATED, EXTENDED RELEASE ORAL at 06:02

## 2025-02-18 RX ADMIN — MUPIROCIN: 20 OINTMENT TOPICAL at 12:02

## 2025-02-18 RX ADMIN — DIPHENHYDRAMINE HYDROCHLORIDE 25 MG: 25 CAPSULE ORAL at 10:02

## 2025-02-18 RX ADMIN — SUCRALFATE 1 G: 1 TABLET ORAL at 10:02

## 2025-02-18 RX ADMIN — GABAPENTIN 600 MG: 300 CAPSULE ORAL at 04:02

## 2025-02-18 RX ADMIN — TIZANIDINE 4 MG: 2 TABLET ORAL at 08:02

## 2025-02-18 RX ADMIN — TIZANIDINE 4 MG: 2 TABLET ORAL at 06:02

## 2025-02-18 RX ADMIN — APIXABAN 5 MG: 5 TABLET, FILM COATED ORAL at 08:02

## 2025-02-18 RX ADMIN — GABAPENTIN 600 MG: 300 CAPSULE ORAL at 08:02

## 2025-02-18 RX ADMIN — POLYETHYLENE GLYCOL 3350 17 G: 17 POWDER, FOR SOLUTION ORAL at 09:02

## 2025-02-18 RX ADMIN — METOCLOPRAMIDE 5 MG: 5 TABLET ORAL at 10:02

## 2025-02-18 RX ADMIN — PANTOPRAZOLE SODIUM 40 MG: 40 TABLET, DELAYED RELEASE ORAL at 09:02

## 2025-02-18 RX ADMIN — POLYETHYLENE GLYCOL 3350 17 G: 17 POWDER, FOR SOLUTION ORAL at 08:02

## 2025-02-18 RX ADMIN — DICYCLOMINE HYDROCHLORIDE 10 MG: 10 CAPSULE ORAL at 08:02

## 2025-02-18 RX ADMIN — ACETAMINOPHEN 1000 MG: 500 TABLET ORAL at 01:02

## 2025-02-18 NOTE — PROGRESS NOTES
[facility] Medicine  Progress Note    Patient Name: Nazanin Malone  MRN: [id number]  Patient Class: IP- Inpatient   Admission Date: 2/16/2025  Length of Stay: 1 days  Attending Physician: Marquez Mcneal MD  Primary Care Provider: Kezia, Primary Doctor        Subjective     Principal Problem:Sickle cell disease, type S beta-zero thalassemia with crisis        HPI:  Nazanin Malone is a 44 y.o. female with PMHx of sickle cell beta thalassemia zero, multiple pulmonary emboli on chronic AC, asthma, and HTN presenting with pain and URI. Reports acute pain to the bilateral knees and hips that feels like a sickle cell pain crisis for the last 2 days. She has been taking her gabapentin, tizanadine, and norco without relief.  Of note, she ran out of MS contin a few days ago, around time of symptom onset.  She reports there was an issue with her insurance coverage after a dosing change to the MS contin from BID to TID, though she believes this is now fixed.  She has had upper respiratory symptoms the last couple of days with nasal congestion, sore throat, headache. She has a dry cough and feels slightly SOB from all of the coughing. She had a fever last night 101F.  No chest pain, diarrhea, dysuria. She has chronic issue with abdominal pain nausea/vomiting that she says is from gastritis and she takes Bentyl, Reglan, Carafate for this which helps.  Saw her hematologist last Tuesday. She is able to ambualt despite pain.     ED: hypertensive otherwise HDS afebrile. Hbg 9.6.  Reticulocytes normal  2.2%.T bili normal.  WBC 11k. Plts 508. Lipase normal. CMP unremarkable. Flu and Covid negative. CXR with no acute cardiopulmonary process. She was given IV hydromorphone 2 mg IV x2, tylenol, benadryl, 1L LR. Admitted to  for management of pain.     Overview/Hospital Course:  Patient with initial poor rapport with physician as displeasure with pain regimen despite being highly sedated and requesting  significantly more IV pain medications.  Patient now desires to keep same physician.  Will switch pain regimen to PCA through shared decision-making.    Interval History: Patient and attending with significantly improved rapport from yesterday.  Patient  endorsed some vague chest pain overnight and troponin obtained and unremarkable.  In no respiratory distress.  Hemoglobin stable and chest pain reproducible to palpation on exam. Endorses  mild dysuria.    Review of Systems   Constitutional:  Negative for fever.   Respiratory:  Negative for shortness of breath.    Cardiovascular:  Negative for leg swelling.   Gastrointestinal:  Positive for abdominal pain (Chronic, mild).   Genitourinary:  Positive for dysuria. Negative for difficulty urinating.   Musculoskeletal:  Positive for arthralgias.        Pain in left lower extremity near knee   Neurological:  Negative for speech difficulty, numbness and headaches.   Psychiatric/Behavioral:  Negative for agitation and behavioral problems.      Objective:     Vital Signs (Most Recent):  Temp: 97.5 °F (36.4 °C) (02/18/25 0748)  Pulse: 76 (02/18/25 0748)  Resp: 18 (02/18/25 0748)  BP: 134/68 (02/18/25 0748)  SpO2: (!) 93 % (02/18/25 0748) Vital Signs (24h Range):  Temp:  [97.5 °F (36.4 °C)-98.8 °F (37.1 °C)] 97.5 °F (36.4 °C)  Pulse:  [76-93] 76  Resp:  [16-20] 18  SpO2:  [93 %-99 %] 93 %  BP: (117-180)/() 134/68     Weight: 103.1 kg (227 lb 4.7 oz)  Body mass index is 41.57 kg/m².  No intake or output data in the 24 hours ending 02/18/25 1004      Physical Exam  Constitutional:       General: She is not in acute distress.  HENT:      Head: Normocephalic and atraumatic.   Eyes:      General: No scleral icterus.     Extraocular Movements: Extraocular movements intact.   Cardiovascular:      Rate and Rhythm: Normal rate and regular rhythm.   Pulmonary:      Effort: Pulmonary effort is normal.      Breath sounds: Normal breath sounds.   Musculoskeletal:      Right lower  leg: No edema.      Left lower leg: No edema.      Comments: Unremarkable physical exam on left lower extremity  Pain in chest reproducible to palpation   Skin:     Coloration: Skin is not jaundiced.   Neurological:      General: No focal deficit present.      Mental Status: She is alert and oriented to person, place, and time.   Psychiatric:         Mood and Affect: Mood normal.         Behavior: Behavior normal.             Significant Labs: All pertinent labs within the past 24 hours have been reviewed.  CBC:   Recent Labs   Lab 02/16/25 2359 02/17/25  0527 02/18/25  0452   WBC 11.59 13.15* 10.20   HGB 9.6* 8.3* 8.5*   HCT 30.3* 25.9* 26.4*   * 427 481*     CMP:   Recent Labs   Lab 02/16/25 2359 02/18/25 0452    142   K 3.6 4.2    107   CO2 25 28   * 107   BUN 13 16   CREATININE 1.2 1.0   CALCIUM 9.5 9.4   PROT 7.7 7.0   ALBUMIN 3.6 3.2*   BILITOT 0.4 0.4   ALKPHOS 95 89   AST 27 24   ALT 13 11   ANIONGAP 9 7*       Significant Imaging: I have reviewed all pertinent imaging results/findings within the past 24 hours.    Assessment and Plan     * Sickle cell disease, type S beta-zero thalassemia with crisis  - Presenting with acute bilateral hip and knee pain & URI  - Retic count normal. TB normal.  Hbg 9.6.   - CXR without consolidation.Stable on room air. Flu/covid negative.   - Given 2 mg IV dilaudid in ED without relief of pain. Also given 1 L fluids   - Continue home gabapentin, tizanidine  - Continue home hydroxyurea and folic acid  - Restarted home MS contin 30 mg (recently increased from Q12h to Q8h per pt) - she has been out for a few days which is likely exacerbating her current pain  - Changed home norco 10 mg q4h prn to scheduled tylenol 1 g q8h with oxycodone 10 mg q4h for moderate pain, 15 mg q4h for severe pain, and 0.5 mg IV hydromorphone breakthrough with initial increase to 1 mg dilaudid but less frequent  - transition to PCA for analgesia  today    Hypertension  Patient's blood pressure range in the last 24 hours was: BP  Min: 117/65  Max: 180/104.The patient's inpatient anti-hypertensive regimen is listed below:  Current Antihypertensives  amLODIPine tablet 10 mg, Daily, Oral    Plan  - BP waxes and wanes: if consistently high with pain control will consider adjustment in regimen  - Will control pain as well    History of pulmonary embolism  - Continue home eliquis 5 mg BID; patient has been off of it for several weeks due to cost; efforts to ensure she can receive and afford at discharge  - given history of pulmonary embolism, lack of Eliquis for extended period, and chest pain (though reproducible on exam), low threshold for CT PE evaluation if indicated      Anxiety and depression  - Continue home fluoxetine    Anemia  Anemia is likely due to sickle cell-beta thalassemia . Most recent hemoglobin and hematocrit are listed below.  Recent Labs     02/16/25  2359 02/17/25  0527 02/18/25  0452   HGB 9.6* 8.3* 8.5*   HCT 30.3* 25.9* 26.4*     Plan  - Monitor serial CBC: Daily  - Transfuse PRBC if patient becomes hemodynamically unstable, symptomatic or H/H drops below 7/21.  - Patient has not received any PRBC transfusions to date  - Patient's anemia is currently stable    Chronic gastritis  Continue home PPI and carafate     Abdominal pain  - Chronic, stable   - Continue home Bentyl, Carafate, Reglan     Upper respiratory infection  2 days cough, congestion, HA, fever  - Flu/Covid negative  - Stable on room air   - CXR without consolidation; Procal neg  - Supportive care  - Monitor CBC     Morbid obesity  Body mass index is 41.57 kg/m². Morbid obesity complicates all aspects of disease management from diagnostic modalities to treatment. Weight loss encouraged and health benefits explained to patient.           VTE Risk Mitigation (From admission, onward)           Ordered     apixaban tablet 5 mg  2 times daily         02/17/25 0602     IP VTE HIGH  RISK PATIENT  Once         02/17/25 0236     Place sequential compression device  Until discontinued         02/17/25 0236     Reason for No Pharmacological VTE Prophylaxis  Once        Question:  Reasons:  Answer:  Already adequately anticoagulated on oral Anticoagulants    02/17/25 0236                    Discharge Planning   ALYSE: 2/22/2025     Code Status: Full Code   Medical Readiness for Discharge Date:                  Marquez Mcneal MD  Department of Hospital Medicine   [facility]

## 2025-02-18 NOTE — ASSESSMENT & PLAN NOTE
2 days cough, congestion, HA, fever  - Flu/Covid negative  - Stable on room air   - CXR without consolidation; Procal neg  - Supportive care  - Monitor CBC

## 2025-02-18 NOTE — PLAN OF CARE
[facility] - Med Surg  Initial Discharge Assessment       Primary Care Provider: No, Primary Doctor    Admission Diagnosis: Sickle cell pain crisis [D57.00]  Flu-like symptoms [R68.89]  Chest pain [R07.9]    Admission Date: 2/16/2025  Expected Discharge Date: 2/22/2025    Transition of Care Barriers: (P) None    Payor: [employer] MANAGED MEDICARE / Plan: [employer] MEDICARE PLAN HMO / Product Type: Medicare Advantage /     Extended Emergency Contact Information  Primary Emergency Contact: Jack Malone  Mobile Phone: [phone]  Relation: Brother  Secondary Emergency Contact: Timoteo Malone  Mobile Phone: [phone]  Relation: Daughter    Discharge Plan A: (P) Home  Discharge Plan B: (P) Home with family      [facility]  [address]  [facility] [address]  Phone: [phone] Fax: [phone]        Sw met with pt at bedside to discuss dc planning assessment. Pt stated that she lives in a single story home with her mother, daughter and grandchild. Pt does not use any medical equipment in the home. Pt is independent and ambulatory with ADL's. Patient drove self to hospital and will provide transportation in personal vehicle once medically cleared for dc.     Discharge Plan A and Plan B have been determined by review of patient's clinical status, future medical and therapeutic needs, and coverage/benefits for post-acute care in coordination with multidisciplinary team members.     Initial Assessment (most recent)       Adult Discharge Assessment - 02/18/25 1645          Discharge Assessment    Assessment Type Discharge Planning Assessment (P)      Confirmed/corrected address, phone number and insurance Yes (P)      Confirmed Demographics Correct on Facesheet (P)      Source of Information patient (P)      Does patient/caregiver understand observation status Yes (P)      Communicated ALYSE with patient/caregiver Date not available/Unable to determine (P)      Reason For Admission Sickle cell disease,  type S beta-zero thalassemia with crisis (P)      People in Home parent(s);grandchild(jayce);child(jayce), adult (P)      Facility Arrived From: Home (P)      Do you expect to return to your current living situation? Yes (P)      Do you have help at home or someone to help you manage your care at home? No (P)      Prior to hospitilization cognitive status: Alert/Oriented (P)      Current cognitive status: Alert/Oriented (P)      Walking or Climbing Stairs Difficulty no (P)      Dressing/Bathing Difficulty no (P)      Home Accessibility wheelchair accessible (P)      Home Layout Able to live on 1st floor (P)      Equipment Currently Used at Home none (P)      Readmission within 30 days? Yes (P)      Patient currently being followed by outpatient case management? No (P)      Do you currently have service(s) that help you manage your care at home? No (P)      Do you take prescription medications? Yes (P)      Do you have prescription coverage? Yes (P)      Coverage [employer] MANAGED MEDICARE - T MEDICARE PLAN O (P)      Do you have any problems affording any of your prescribed medications? No (P)      Is the patient taking medications as prescribed? yes (P)      Who is going to help you get home at discharge? Selt; pt drove (P)      How do you get to doctors appointments? car, drives self (P)      Are you on dialysis? No (P)      Do you take coumadin? No (P)      Discharge Plan A Home (P)      Discharge Plan B Home with family (P)      DME Needed Upon Discharge  none (P)      Discharge Plan discussed with: Patient (P)      Transition of Care Barriers None (P)         Physical Activity    On average, how many days per week do you engage in moderate to strenuous exercise (like a brisk walk)? 0 days (P)      On average, how many minutes do you engage in exercise at this level? 0 min (P)         Financial Resource Strain    How hard is it for you to pay for the very basics like food, housing, medical care, and heating? Not  hard at all (P)         Housing Stability    In the last 12 months, was there a time when you were not able to pay the mortgage or rent on time? No (P)      At any time in the past 12 months, were you homeless or living in a shelter (including now)? No (P)         Transportation Needs    Has the lack of transportation kept you from medical appointments, meetings, work or from getting things needed for daily living? No (P)         Food Insecurity    Within the past 12 months, you worried that your food would run out before you got the money to buy more. Never true (P)      Within the past 12 months, the food you bought just didn't last and you didn't have money to get more. Never true (P)         Stress    Do you feel stress - tense, restless, nervous, or anxious, or unable to sleep at night because your mind is troubled all the time - these days? Not at all (P)         Social Isolation    How often do you feel lonely or isolated from those around you?  Never (P)         Alcohol Use    Q1: How often do you have a drink containing alcohol? Never (P)      Q2: How many drinks containing alcohol do you have on a typical day when you are drinking? Patient does not drink (P)      Q3: How often do you have six or more drinks on one occasion? Never (P)         [employer]    In the past 12 months has the electric, gas, oil, or water company threatened to shut off services in your home? No (P)         Health Literacy    How often do you need to have someone help you when you read instructions, pamphlets, or other written material from your doctor or pharmacy? Never (P)         OTHER    Name(s) of People in Home Jack Malone [phone] (Son) (P)                      Readmission Assessment (most recent)       Readmission Assessment - 02/18/25 1643          Readmission    Was this a planned readmission? No     Why were you hospitalized in the last 30 days? Infected port     Why were you readmitted? New medical problem     When  you left the hospital how did you feel? Felt okay     When you left the hospital where did you go? Home with Family     Did patient/caregiver refused recommended DC plan? No     Tell me about what happened between when you left the hospital and the day you returned. 2-3 days ago felt SOB, coughing up green sticky phlem     When did you start not feeling well? 2-3 days ago     Did you try to manage your symptoms your self? No     Did you call anyone? No     Why? It was the weekend     Did you try to see or did see a doctor or nurse before you came? No     Why? It was the weekend     Did you have  a follow-up appointment on discharge? Yes     Did you go? No     Why? DARRION Andrea  Case Management  [phone]

## 2025-02-18 NOTE — ASSESSMENT & PLAN NOTE
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 Most recent hemoglobin and hematocrit are listed below.  Recent Labs     02/16/25  2359 02/17/25  0527 02/18/25  0452   HGB 9.6* 8.3* 8.5*   HCT 30.3* 25.9* 26.4*     Plan  - Monitor serial CBC: Daily  - Transfuse PRBC if patient becomes hemodynamically unstable, symptomatic or H/H drops below 7/21.  - Patient has not received any PRBC transfusions to date  - Patient's anemia is currently stable

## 2025-02-18 NOTE — SUBJECTIVE & OBJECTIVE
Interval History: Patient and attending with significantly improved rapport from yesterday.  Patient  endorsed some vague chest pain overnight and troponin obtained and unremarkable.  In no respiratory distress.  Hemoglobin stable and chest pain reproducible to palpation on exam. Endorses  mild dysuria.    Review of Systems   Constitutional:  Negative for fever.   Respiratory:  Negative for shortness of breath.    Cardiovascular:  Negative for leg swelling.   Gastrointestinal:  Positive for abdominal pain (Chronic, mild).   Genitourinary:  Positive for dysuria. Negative for difficulty urinating.   Musculoskeletal:  Positive for arthralgias.        Pain in left lower extremity near knee   Neurological:  Negative for speech difficulty, numbness and headaches.   Psychiatric/Behavioral:  Negative for agitation and behavioral problems.      Objective:     Vital Signs (Most Recent):  Temp: 97.5 °F (36.4 °C) (02/18/25 0748)  Pulse: 76 (02/18/25 0748)  Resp: 18 (02/18/25 0748)  BP: 134/68 (02/18/25 0748)  SpO2: (!) 93 % (02/18/25 0748) Vital Signs (24h Range):  Temp:  [97.5 °F (36.4 °C)-98.8 °F (37.1 °C)] 97.5 °F (36.4 °C)  Pulse:  [76-93] 76  Resp:  [16-20] 18  SpO2:  [93 %-99 %] 93 %  BP: (117-180)/() 134/68     Weight: 103.1 kg (227 lb 4.7 oz)  Body mass index is 41.57 kg/m².  No intake or output data in the 24 hours ending 02/18/25 1004      Physical Exam  Constitutional:       General: She is not in acute distress.  HENT:      Head: Normocephalic and atraumatic.   Eyes:      General: No scleral icterus.     Extraocular Movements: Extraocular movements intact.   Cardiovascular:      Rate and Rhythm: Normal rate and regular rhythm.   Pulmonary:      Effort: Pulmonary effort is normal.      Breath sounds: Normal breath sounds.   Musculoskeletal:      Right lower leg: No edema.      Left lower leg: No edema.      Comments: Unremarkable physical exam on left lower extremity  Pain in chest reproducible to palpation    Skin:     Coloration: Skin is not jaundiced.   Neurological:      General: No focal deficit present.      Mental Status: She is alert and oriented to person, place, and time.   Psychiatric:         Mood and Affect: Mood normal.         Behavior: Behavior normal.             Significant Labs: All pertinent labs within the past 24 hours have been reviewed.  CBC:   Recent Labs   Lab 02/16/25 2359 02/17/25  0527 02/18/25  0452   WBC 11.59 13.15* 10.20   HGB 9.6* 8.3* 8.5*   HCT 30.3* 25.9* 26.4*   * 427 481*     CMP:   Recent Labs   Lab 02/16/25 2359 02/18/25  0452    142   K 3.6 4.2    107   CO2 25 28   * 107   BUN 13 16   CREATININE 1.2 1.0   CALCIUM 9.5 9.4   PROT 7.7 7.0   ALBUMIN 3.6 3.2*   BILITOT 0.4 0.4   ALKPHOS 95 89   AST 27 24   ALT 13 11   ANIONGAP 9 7*       Significant Imaging: I have reviewed all pertinent imaging results/findings within the past 24 hours.

## 2025-02-18 NOTE — CARE UPDATE
Unit [facility] Care Support Interaction      I have reviewed the chart of Nazanin Malone who is hospitalized for Sickle cell disease, type S beta-zero thalassemia with crisis. The patient is currently located in the following unit: MSU        I have assisted the primary physician in management of the following:      MRSA Decolonization - Mupirocin ordered and CHG ordered       Unique Hawthorne PA-C  Unit Based [facility]

## 2025-02-18 NOTE — HOSPITAL COURSE
Patient with initial poor rapport with physician as displeasure with pain regimen despite being highly sedated and requesting significantly more IV pain medications.  Patient now desires to keep same physician.  Will switch pain regimen to PCA through shared decision-making.  Endorsed cough with productive sputum and now obtaining nasopharyngeal pathogen panel. Repeat CXR and procal negative. Discussed abx likely not indicated. Pain continued to improve with Morphine PCA and patient denied significant itching. Patient unable to afford Eliquis and she was switched to lovenox with plans for Heme Onc follow up.    Pt deemed appropriate for discharge; seen and examined prior to departure. Plan discussed with pt, who was agreeable and amenable; medications were discussed and reviewed, outpatient follow-up scheduled, ER precautions were given, all questions were answered to the pt's satisfaction, and she was subsequently discharged.

## 2025-02-18 NOTE — PLAN OF CARE
Problem: Adult Inpatient Plan of Care  Goal: Plan of Care Review  2/17/2025 1842 by Gaviota Lynch RN  Outcome: Progressing  2/17/2025 1842 by Gaviota Lynch RN  Outcome: Progressing  Goal: Patient-Specific Goal (Individualized)  2/17/2025 1842 by Gaviota Lynch RN  Outcome: Progressing  2/17/2025 1842 by Gaviota Lynch RN  Outcome: Progressing  Goal: Absence of Hospital-Acquired Illness or Injury  2/17/2025 1842 by Gaviota Lynch RN  Outcome: Progressing  2/17/2025 1842 by Gaviota Lynch RN  Outcome: Progressing  Goal: Optimal Comfort and Wellbeing  2/17/2025 1842 by Gaviota Lynch RN  Outcome: Progressing  2/17/2025 1842 by Gaviota Lynch RN  Outcome: Progressing  Goal: Readiness for Transition of Care  2/17/2025 1842 by Gaviota Lynch RN  Outcome: Progressing  2/17/2025 1842 by Gaviota Lynch RN  Outcome: Progressing     Problem: Bariatric Environmental Safety  Goal: Safety Maintained with Care  2/17/2025 1842 by Gaviota Lynch RN  Outcome: Progressing  2/17/2025 1842 by Gaviota Lynch RN  Outcome: Progressing     Problem: Infection  Goal: Absence of Infection Signs and Symptoms  2/17/2025 1842 by Gaviota Lynch RN  Outcome: Progressing  2/17/2025 1842 by Gaviota Lynch RN  Outcome: Progressing

## 2025-02-18 NOTE — ASSESSMENT & PLAN NOTE
Body mass index is 41.57 kg/m². Morbid obesity complicates all aspects of disease management from diagnostic modalities to treatment. Weight loss encouraged and health benefits explained to patient.

## 2025-02-18 NOTE — PLAN OF CARE
Pt put herself on 2L of oxygen. MD notified.     Problem: Adult Inpatient Plan of Care  Goal: Plan of Care Review  Outcome: Progressing  Goal: Patient-Specific Goal (Individualized)  Outcome: Progressing  Goal: Absence of Hospital-Acquired Illness or Injury  Outcome: Progressing  Goal: Optimal Comfort and Wellbeing  Outcome: Progressing  Goal: Readiness for Transition of Care  Outcome: Progressing     Problem: Bariatric Environmental Safety  Goal: Safety Maintained with Care  Outcome: Progressing     Problem: Infection  Goal: Absence of Infection Signs and Symptoms  Outcome: Progressing

## 2025-02-18 NOTE — ASSESSMENT & PLAN NOTE
Patient's blood pressure range in the last 24 hours was: BP  Min: 117/65  Max: 180/104.The patient's inpatient anti-hypertensive regimen is listed below:  Current Antihypertensives  amLODIPine tablet 10 mg, Daily, Oral    Plan  - BP waxes and wanes: if consistently high with pain control will consider adjustment in regimen  - Will control pain as well

## 2025-02-18 NOTE — ASSESSMENT & PLAN NOTE
- Presenting with acute bilateral hip and knee pain & URI  - Retic count normal. TB normal.  Hbg 9.6.   - CXR without consolidation.Stable on room air. Flu/covid negative.   - Given 2 mg IV dilaudid in ED without relief of pain. Also given 1 L fluids   - Continue home gabapentin, tizanidine  - Continue home hydroxyurea and folic acid  - Restarted home MS contin 30 mg (recently increased from Q12h to Q8h per pt) - she has been out for a few days which is likely exacerbating her current pain  - Changed home norco 10 mg q4h prn to scheduled tylenol 1 g q8h with oxycodone 10 mg q4h for moderate pain, 15 mg q4h for severe pain, and 0.5 mg IV hydromorphone breakthrough with initial increase to 1 mg dilaudid but less frequent  - transition to PCA for analgesia today

## 2025-02-18 NOTE — ASSESSMENT & PLAN NOTE
- Continue home eliquis 5 mg BID; patient has been off of it for several weeks due to cost; efforts to ensure she can receive and afford at discharge  - given history of pulmonary embolism, lack of Eliquis for extended period, and chest pain (though reproducible on exam), low threshold for CT PE evaluation if indicated

## 2025-02-19 LAB
ADENOVIRUS: NOT DETECTED
ALBUMIN SERPL BCP-MCNC: 3.1 G/DL (ref 3.5–5.2)
ALP SERPL-CCNC: 79 U/L (ref 40–150)
ALT SERPL W/O P-5'-P-CCNC: 8 U/L (ref 10–44)
ANION GAP SERPL CALC-SCNC: 7 MMOL/L (ref 8–16)
AST SERPL-CCNC: 19 U/L (ref 10–40)
BASOPHILS # BLD AUTO: 0.06 K/UL (ref 0–0.2)
BASOPHILS NFR BLD: 0.7 % (ref 0–1.9)
BILIRUB SERPL-MCNC: 0.4 MG/DL (ref 0.1–1)
BORDETELLA PARAPERTUSSIS (IS1001): NOT DETECTED
BORDETELLA PERTUSSIS (PTXP): NOT DETECTED
BUN SERPL-MCNC: 18 MG/DL (ref 6–20)
CALCIUM SERPL-MCNC: 9.3 MG/DL (ref 8.7–10.5)
CHLAMYDIA PNEUMONIAE: NOT DETECTED
CHLORIDE SERPL-SCNC: 103 MMOL/L (ref 95–110)
CO2 SERPL-SCNC: 29 MMOL/L (ref 23–29)
CORONAVIRUS 229E, COMMON COLD VIRUS: NOT DETECTED
CORONAVIRUS HKU1, COMMON COLD VIRUS: NOT DETECTED
CORONAVIRUS NL63, COMMON COLD VIRUS: NOT DETECTED
CORONAVIRUS OC43, COMMON COLD VIRUS: NOT DETECTED
CREAT SERPL-MCNC: 1 MG/DL (ref 0.5–1.4)
DIFFERENTIAL METHOD BLD: ABNORMAL
EOSINOPHIL # BLD AUTO: 0.4 K/UL (ref 0–0.5)
EOSINOPHIL NFR BLD: 4.8 % (ref 0–8)
ERYTHROCYTE [DISTWIDTH] IN BLOOD BY AUTOMATED COUNT: 25.2 % (ref 11.5–14.5)
EST. GFR  (NO RACE VARIABLE): >60 ML/MIN/1.73 M^2
FLUBV RNA NPH QL NAA+NON-PROBE: NOT DETECTED
GLUCOSE SERPL-MCNC: 122 MG/DL (ref 70–110)
HCT VFR BLD AUTO: 24.3 % (ref 37–48.5)
HGB BLD-MCNC: 8 G/DL (ref 12–16)
HPIV1 RNA NPH QL NAA+NON-PROBE: NOT DETECTED
HPIV2 RNA NPH QL NAA+NON-PROBE: NOT DETECTED
HPIV3 RNA NPH QL NAA+NON-PROBE: NOT DETECTED
HPIV4 RNA NPH QL NAA+NON-PROBE: NOT DETECTED
HUMAN METAPNEUMOVIRUS: NOT DETECTED
IMM GRANULOCYTES # BLD AUTO: 0.02 K/UL (ref 0–0.04)
IMM GRANULOCYTES NFR BLD AUTO: 0.2 % (ref 0–0.5)
INFLUENZA A (SUBTYPES H1,H1-2009,H3): NOT DETECTED
LYMPHOCYTES # BLD AUTO: 3 K/UL (ref 1–4.8)
LYMPHOCYTES NFR BLD: 34.1 % (ref 18–48)
MCH RBC QN AUTO: 23.4 PG (ref 27–31)
MCHC RBC AUTO-ENTMCNC: 32.9 G/DL (ref 32–36)
MCV RBC AUTO: 71 FL (ref 82–98)
MONOCYTES # BLD AUTO: 1 K/UL (ref 0.3–1)
MONOCYTES NFR BLD: 11.6 % (ref 4–15)
MYCOPLASMA PNEUMONIAE: NOT DETECTED
NEUTROPHILS # BLD AUTO: 4.2 K/UL (ref 1.8–7.7)
NEUTROPHILS NFR BLD: 48.6 % (ref 38–73)
NRBC BLD-RTO: 3 /100 WBC
PLATELET # BLD AUTO: 473 K/UL (ref 150–450)
PMV BLD AUTO: 9.6 FL (ref 9.2–12.9)
POTASSIUM SERPL-SCNC: 4.1 MMOL/L (ref 3.5–5.1)
PROT SERPL-MCNC: 6.5 G/DL (ref 6–8.4)
RBC # BLD AUTO: 3.42 M/UL (ref 4–5.4)
RESPIRATORY INFECTION PANEL SOURCE: NORMAL
RSV RNA NPH QL NAA+NON-PROBE: NOT DETECTED
RV+EV RNA NPH QL NAA+NON-PROBE: NOT DETECTED
SARS-COV-2 RNA RESP QL NAA+PROBE: NOT DETECTED
SODIUM SERPL-SCNC: 139 MMOL/L (ref 136–145)
WBC # BLD AUTO: 8.69 K/UL (ref 3.9–12.7)

## 2025-02-19 PROCEDURE — 87633 RESP VIRUS 12-25 TARGETS: CPT | Performed by: STUDENT IN AN ORGANIZED HEALTH CARE EDUCATION/TRAINING PROGRAM

## 2025-02-19 PROCEDURE — 63600175 PHARM REV CODE 636 W HCPCS: Performed by: STUDENT IN AN ORGANIZED HEALTH CARE EDUCATION/TRAINING PROGRAM

## 2025-02-19 PROCEDURE — 80053 COMPREHEN METABOLIC PANEL: CPT

## 2025-02-19 PROCEDURE — 25000003 PHARM REV CODE 250

## 2025-02-19 PROCEDURE — 11000001 HC ACUTE MED/SURG PRIVATE ROOM

## 2025-02-19 PROCEDURE — 85025 COMPLETE CBC W/AUTO DIFF WBC: CPT

## 2025-02-19 PROCEDURE — 25000003 PHARM REV CODE 250: Performed by: STUDENT IN AN ORGANIZED HEALTH CARE EDUCATION/TRAINING PROGRAM

## 2025-02-19 RX ADMIN — FLUOXETINE HYDROCHLORIDE 40 MG: 20 CAPSULE ORAL at 08:02

## 2025-02-19 RX ADMIN — SUCRALFATE 1 G: 1 TABLET ORAL at 04:02

## 2025-02-19 RX ADMIN — FOLIC ACID 1 MG: 1 TABLET ORAL at 08:02

## 2025-02-19 RX ADMIN — MUPIROCIN: 20 OINTMENT TOPICAL at 10:02

## 2025-02-19 RX ADMIN — Medication: at 03:02

## 2025-02-19 RX ADMIN — TIZANIDINE 4 MG: 2 TABLET ORAL at 02:02

## 2025-02-19 RX ADMIN — DIPHENHYDRAMINE HYDROCHLORIDE 25 MG: 25 CAPSULE ORAL at 08:02

## 2025-02-19 RX ADMIN — AMLODIPINE BESYLATE 10 MG: 10 TABLET ORAL at 08:02

## 2025-02-19 RX ADMIN — DICYCLOMINE HYDROCHLORIDE 10 MG: 10 CAPSULE ORAL at 09:02

## 2025-02-19 RX ADMIN — Medication: at 08:02

## 2025-02-19 RX ADMIN — DICYCLOMINE HYDROCHLORIDE 10 MG: 10 CAPSULE ORAL at 10:02

## 2025-02-19 RX ADMIN — PANTOPRAZOLE SODIUM 40 MG: 40 TABLET, DELAYED RELEASE ORAL at 09:02

## 2025-02-19 RX ADMIN — SUCRALFATE 1 G: 1 TABLET ORAL at 10:02

## 2025-02-19 RX ADMIN — METOCLOPRAMIDE 5 MG: 5 TABLET ORAL at 10:02

## 2025-02-19 RX ADMIN — DICYCLOMINE HYDROCHLORIDE 10 MG: 10 CAPSULE ORAL at 04:02

## 2025-02-19 RX ADMIN — PANTOPRAZOLE SODIUM 40 MG: 40 TABLET, DELAYED RELEASE ORAL at 08:02

## 2025-02-19 RX ADMIN — TIZANIDINE 4 MG: 2 TABLET ORAL at 06:02

## 2025-02-19 RX ADMIN — GABAPENTIN 600 MG: 300 CAPSULE ORAL at 08:02

## 2025-02-19 RX ADMIN — POLYETHYLENE GLYCOL 3350 17 G: 17 POWDER, FOR SOLUTION ORAL at 08:02

## 2025-02-19 RX ADMIN — METOCLOPRAMIDE 5 MG: 5 TABLET ORAL at 06:02

## 2025-02-19 RX ADMIN — APIXABAN 5 MG: 5 TABLET, FILM COATED ORAL at 08:02

## 2025-02-19 RX ADMIN — POLYETHYLENE GLYCOL 3350 17 G: 17 POWDER, FOR SOLUTION ORAL at 10:02

## 2025-02-19 RX ADMIN — GABAPENTIN 600 MG: 300 CAPSULE ORAL at 02:02

## 2025-02-19 RX ADMIN — MUPIROCIN: 20 OINTMENT TOPICAL at 09:02

## 2025-02-19 RX ADMIN — SUCRALFATE 1 G: 1 TABLET ORAL at 09:02

## 2025-02-19 RX ADMIN — METOCLOPRAMIDE 5 MG: 5 TABLET ORAL at 04:02

## 2025-02-19 RX ADMIN — HYDROXYUREA 1000 MG: 500 CAPSULE ORAL at 08:02

## 2025-02-19 RX ADMIN — Medication: at 09:02

## 2025-02-19 RX ADMIN — SUCRALFATE 1 G: 1 TABLET ORAL at 06:02

## 2025-02-19 RX ADMIN — APIXABAN 5 MG: 5 TABLET, FILM COATED ORAL at 09:02

## 2025-02-19 RX ADMIN — ACETAMINOPHEN 1000 MG: 500 TABLET ORAL at 02:02

## 2025-02-19 RX ADMIN — ACETAMINOPHEN 1000 MG: 500 TABLET ORAL at 09:02

## 2025-02-19 RX ADMIN — GABAPENTIN 600 MG: 300 CAPSULE ORAL at 09:02

## 2025-02-19 RX ADMIN — ACETAMINOPHEN 1000 MG: 500 TABLET ORAL at 06:02

## 2025-02-19 RX ADMIN — DICYCLOMINE HYDROCHLORIDE 10 MG: 10 CAPSULE ORAL at 06:02

## 2025-02-19 RX ADMIN — TIZANIDINE 4 MG: 2 TABLET ORAL at 09:02

## 2025-02-19 NOTE — ASSESSMENT & PLAN NOTE
- Presenting with acute bilateral hip and knee pain & URI  - Retic count normal. TB normal.  Hbg 9.6.   - CXR without consolidation.Stable on room air. Flu/covid negative.   - Given 2 mg IV dilaudid in ED without relief of pain. Also given 1 L fluids   - Continue home gabapentin, tizanidine  - Continue home hydroxyurea and folic acid  - Restarted home MS contin 30 mg (recently increased from Q12h to Q8h per pt) - she has been out for a few days which is likely exacerbating her current pain  - Changed home norco 10 mg q4h prn to scheduled tylenol 1 g q8h with oxycodone 10 mg q4h for moderate pain, 15 mg q4h for severe pain, and 0.5 mg IV hydromorphone breakthrough with initial increase to 1 mg dilaudid but less frequent  - transitioned to PCA for analgesia and patient tolerating well

## 2025-02-19 NOTE — SUBJECTIVE & OBJECTIVE
Interval History: patient requesting antibiotics for perceived need to prevent pneumonia but counseled that no current indication.  Doing well on PCA pump.    Review of Systems   Constitutional:  Negative for fatigue and fever.   Respiratory:  Negative for shortness of breath.    Cardiovascular:  Positive for chest pain (Reproducible on exam upon palpation).   Gastrointestinal:  Negative for abdominal pain.   Genitourinary:  Negative for difficulty urinating.   Musculoskeletal:  Positive for arthralgias and back pain.   Neurological:  Negative for numbness and headaches.   Psychiatric/Behavioral:  Negative for agitation and behavioral problems.      Objective:     Vital Signs (Most Recent):  Temp: 98.4 °F (36.9 °C) (02/19/25 1238)  Pulse: 88 (02/19/25 1238)  Resp: 18 (02/19/25 1238)  BP: 139/87 (02/19/25 1238)  SpO2: 100 % (02/19/25 1238) Vital Signs (24h Range):  Temp:  [98 °F (36.7 °C)-99 °F (37.2 °C)] 98.4 °F (36.9 °C)  Pulse:  [73-93] 88  Resp:  [16-18] 18  SpO2:  [92 %-100 %] 100 %  BP: (102-139)/(57-87) 139/87     Weight: 103.1 kg (227 lb 4.7 oz)  Body mass index is 41.57 kg/m².  No intake or output data in the 24 hours ending 02/19/25 1411      Physical Exam  Constitutional:       General: She is not in acute distress.  HENT:      Head: Normocephalic and atraumatic.      Nose:      Comments: Capnography in place  Cardiovascular:      Rate and Rhythm: Normal rate and regular rhythm.   Pulmonary:      Effort: Pulmonary effort is normal.      Breath sounds: Normal breath sounds. No wheezing.   Musculoskeletal:      Right lower leg: No edema.      Left lower leg: No edema.      Comments: Reproducible chest pain near left pectoralis muscle   Neurological:      General: No focal deficit present.      Mental Status: She is alert and oriented to person, place, and time.   Psychiatric:         Mood and Affect: Mood normal.         Behavior: Behavior normal.             Significant Labs: All pertinent labs within the  past 24 hours have been reviewed.  CBC:   Recent Labs   Lab 02/18/25  0452 02/19/25  0838   WBC 10.20 8.69   HGB 8.5* 8.0*   HCT 26.4* 24.3*   * 473*     CMP:   Recent Labs   Lab 02/18/25  0452 02/19/25  0838    139   K 4.2 4.1    103   CO2 28 29    122*   BUN 16 18   CREATININE 1.0 1.0   CALCIUM 9.4 9.3   PROT 7.0 6.5   ALBUMIN 3.2* 3.1*   BILITOT 0.4 0.4   ALKPHOS 89 79   AST 24 19   ALT 11 8*   ANIONGAP 7* 7*       Significant Imaging: I have reviewed all pertinent imaging results/findings within the past 24 hours.

## 2025-02-19 NOTE — ASSESSMENT & PLAN NOTE
Patient's blood pressure range in the last 24 hours was: BP  Min: 102/57  Max: 139/87.The patient's inpatient anti-hypertensive regimen is listed below:  Current Antihypertensives  amLODIPine tablet 10 mg, Daily, Oral    Plan  - BP waxes and wanes: if consistently high with pain control will consider adjustment in regimen  - Will control pain as well

## 2025-02-19 NOTE — ASSESSMENT & PLAN NOTE
2 days cough, congestion, HA, fever  - Flu/Covid negative  - Stable on room air   - CXR without consolidation; Procal neg  - Supportive care  - Monitor CBC   - follow up viral nasopharyngeal panel

## 2025-02-19 NOTE — PROGRESS NOTES
[facility]  Progress Note    Patient Name: Nazanin Malone  MRN: [id number]  Patient Class: IP- Inpatient   Admission Date: 2/16/2025  Length of Stay: 2 days  Attending Physician: Marquez Mcneal MD  Primary Care Provider: Kezia, Primary Doctor        Subjective     Principal Problem:Sickle cell disease, type S beta-zero thalassemia with crisis        HPI:  Nazanin Malone is a 44 y.o. female with PMHx of sickle cell beta thalassemia zero, multiple pulmonary emboli on chronic AC, asthma, and HTN presenting with pain and URI. Reports acute pain to the bilateral knees and hips that feels like a sickle cell pain crisis for the last 2 days. She has been taking her gabapentin, tizanadine, and norco without relief.  Of note, she ran out of MS contin a few days ago, around time of symptom onset.  She reports there was an issue with her insurance coverage after a dosing change to the MS contin from BID to TID, though she believes this is now fixed.  She has had upper respiratory symptoms the last couple of days with nasal congestion, sore throat, headache. She has a dry cough and feels slightly SOB from all of the coughing. She had a fever last night 101F.  No chest pain, diarrhea, dysuria. She has chronic issue with abdominal pain nausea/vomiting that she says is from gastritis and she takes Bentyl, Reglan, Carafate for this which helps.  Saw her hematologist last Tuesday. She is able to ambualt despite pain.     ED: hypertensive otherwise HDS afebrile. Hbg 9.6.  Reticulocytes normal  2.2%.T bili normal.  WBC 11k. Plts 508. Lipase normal. CMP unremarkable. Flu and Covid negative. CXR with no acute cardiopulmonary process. She was given IV hydromorphone 2 mg IV x2, tylenol, benadryl, 1L LR. Admitted to  for management of pain.     Overview/Hospital Course:  Patient with initial poor rapport with physician as displeasure with pain regimen despite being highly sedated and requesting  significantly more IV pain medications.  Patient now desires to keep same physician.  Will switch pain regimen to PCA through shared decision-making.  Endorsed cough with productive sputum and now obtaining nasopharyngeal pathogen panel.    Interval History: patient requesting antibiotics for perceived need to prevent pneumonia but counseled that no current indication.  Doing well on PCA pump.    Review of Systems   Constitutional:  Negative for fatigue and fever.   Respiratory:  Negative for shortness of breath.    Cardiovascular:  Positive for chest pain (Reproducible on exam upon palpation).   Gastrointestinal:  Negative for abdominal pain.   Genitourinary:  Negative for difficulty urinating.   Musculoskeletal:  Positive for arthralgias and back pain.   Neurological:  Negative for numbness and headaches.   Psychiatric/Behavioral:  Negative for agitation and behavioral problems.      Objective:     Vital Signs (Most Recent):  Temp: 98.4 °F (36.9 °C) (02/19/25 1238)  Pulse: 88 (02/19/25 1238)  Resp: 18 (02/19/25 1238)  BP: 139/87 (02/19/25 1238)  SpO2: 100 % (02/19/25 1238) Vital Signs (24h Range):  Temp:  [98 °F (36.7 °C)-99 °F (37.2 °C)] 98.4 °F (36.9 °C)  Pulse:  [73-93] 88  Resp:  [16-18] 18  SpO2:  [92 %-100 %] 100 %  BP: (102-139)/(57-87) 139/87     Weight: 103.1 kg (227 lb 4.7 oz)  Body mass index is 41.57 kg/m².  No intake or output data in the 24 hours ending 02/19/25 1411      Physical Exam  Constitutional:       General: She is not in acute distress.  HENT:      Head: Normocephalic and atraumatic.      Nose:      Comments: Capnography in place  Cardiovascular:      Rate and Rhythm: Normal rate and regular rhythm.   Pulmonary:      Effort: Pulmonary effort is normal.      Breath sounds: Normal breath sounds. No wheezing.   Musculoskeletal:      Right lower leg: No edema.      Left lower leg: No edema.      Comments: Reproducible chest pain near left pectoralis muscle   Neurological:      General: No focal  deficit present.      Mental Status: She is alert and oriented to person, place, and time.   Psychiatric:         Mood and Affect: Mood normal.         Behavior: Behavior normal.             Significant Labs: All pertinent labs within the past 24 hours have been reviewed.  CBC:   Recent Labs   Lab 02/18/25  0452 02/19/25  0838   WBC 10.20 8.69   HGB 8.5* 8.0*   HCT 26.4* 24.3*   * 473*     CMP:   Recent Labs   Lab 02/18/25  0452 02/19/25  0838    139   K 4.2 4.1    103   CO2 28 29    122*   BUN 16 18   CREATININE 1.0 1.0   CALCIUM 9.4 9.3   PROT 7.0 6.5   ALBUMIN 3.2* 3.1*   BILITOT 0.4 0.4   ALKPHOS 89 79   AST 24 19   ALT 11 8*   ANIONGAP 7* 7*       Significant Imaging: I have reviewed all pertinent imaging results/findings within the past 24 hours.    Assessment and Plan     * Sickle cell disease, type S beta-zero thalassemia with crisis  - Presenting with acute bilateral hip and knee pain & URI  - Retic count normal. TB normal.  Hbg 9.6.   - CXR without consolidation.Stable on room air. Flu/covid negative.   - Given 2 mg IV dilaudid in ED without relief of pain. Also given 1 L fluids   - Continue home gabapentin, tizanidine  - Continue home hydroxyurea and folic acid  - Restarted home MS contin 30 mg (recently increased from Q12h to Q8h per pt) - she has been out for a few days which is likely exacerbating her current pain  - Changed home norco 10 mg q4h prn to scheduled tylenol 1 g q8h with oxycodone 10 mg q4h for moderate pain, 15 mg q4h for severe pain, and 0.5 mg IV hydromorphone breakthrough with initial increase to 1 mg dilaudid but less frequent  - transitioned to PCA for analgesia and patient tolerating well    Hypertension  Patient's blood pressure range in the last 24 hours was: BP  Min: 102/57  Max: 139/87.The patient's inpatient anti-hypertensive regimen is listed below:  Current Antihypertensives  amLODIPine tablet 10 mg, Daily, Oral    Plan  - BP waxes and wanes: if  consistently high with pain control will consider adjustment in regimen  - Will control pain as well    History of pulmonary embolism  - Continue home eliquis 5 mg BID; patient has been off of it for several weeks due to cost; efforts to ensure she can receive and afford at discharge  - given history of pulmonary embolism, lack of Eliquis for extended period, and chest pain (though reproducible on exam), low threshold for CT PE evaluation if indicated      Anxiety and depression  - Continue home fluoxetine    Anemia  Anemia is likely due to sickle cell-beta thalassemia . Most recent hemoglobin and hematocrit are listed below.  Recent Labs     02/17/25  0527 02/18/25  0452 02/19/25  0838   HGB 8.3* 8.5* 8.0*   HCT 25.9* 26.4* 24.3*     Plan  - Monitor serial CBC: Daily  - Transfuse PRBC if patient becomes hemodynamically unstable, symptomatic or H/H drops below 7/21.  - Patient has not received any PRBC transfusions to date  - Patient's anemia is currently stable    Chronic gastritis  Continue home PPI and carafate     Abdominal pain  - Chronic, stable   - Continue home Bentyl, Carafate, Reglan     Upper respiratory infection  2 days cough, congestion, HA, fever  - Flu/Covid negative  - Stable on room air   - CXR without consolidation; Procal neg  - Supportive care  - Monitor CBC   - follow up viral nasopharyngeal panel    Morbid obesity  Body mass index is 41.57 kg/m². Morbid obesity complicates all aspects of disease management from diagnostic modalities to treatment. Weight loss encouraged and health benefits explained to patient.           VTE Risk Mitigation (From admission, onward)           Ordered     apixaban tablet 5 mg  2 times daily         02/17/25 0602     IP VTE HIGH RISK PATIENT  Once         02/17/25 0236     Place sequential compression device  Until discontinued         02/17/25 0236     Reason for No Pharmacological VTE Prophylaxis  Once        Question:  Reasons:  Answer:  Already adequately  anticoagulated on oral Anticoagulants    02/17/25 0236                    Discharge Planning   ALYSE: 2/22/2025     Code Status: Full Code   Medical Readiness for Discharge Date:   Discharge Plan A: Home                Marquez Mcneal MD  Department of Hospital Medicine   [facility]

## 2025-02-19 NOTE — ASSESSMENT & PLAN NOTE
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 Most recent hemoglobin and hematocrit are listed below.  Recent Labs     02/17/25  0527 02/18/25  0452 02/19/25  0838   HGB 8.3* 8.5* 8.0*   HCT 25.9* 26.4* 24.3*     Plan  - Monitor serial CBC: Daily  - Transfuse PRBC if patient becomes hemodynamically unstable, symptomatic or H/H drops below 7/21.  - Patient has not received any PRBC transfusions to date  - Patient's anemia is currently stable

## 2025-02-20 LAB
ALBUMIN SERPL BCP-MCNC: 3.1 G/DL (ref 3.5–5.2)
ALP SERPL-CCNC: 84 U/L (ref 40–150)
ALT SERPL W/O P-5'-P-CCNC: 8 U/L (ref 10–44)
ANION GAP SERPL CALC-SCNC: 6 MMOL/L (ref 8–16)
AST SERPL-CCNC: 22 U/L (ref 10–40)
BASOPHILS # BLD AUTO: 0.07 K/UL (ref 0–0.2)
BASOPHILS NFR BLD: 0.6 % (ref 0–1.9)
BILIRUB SERPL-MCNC: 0.3 MG/DL (ref 0.1–1)
BUN SERPL-MCNC: 21 MG/DL (ref 6–20)
CALCIUM SERPL-MCNC: 9.1 MG/DL (ref 8.7–10.5)
CHLORIDE SERPL-SCNC: 103 MMOL/L (ref 95–110)
CO2 SERPL-SCNC: 30 MMOL/L (ref 23–29)
CREAT SERPL-MCNC: 1.1 MG/DL (ref 0.5–1.4)
DIFFERENTIAL METHOD BLD: ABNORMAL
EOSINOPHIL # BLD AUTO: 0.4 K/UL (ref 0–0.5)
EOSINOPHIL NFR BLD: 3.8 % (ref 0–8)
ERYTHROCYTE [DISTWIDTH] IN BLOOD BY AUTOMATED COUNT: 24.7 % (ref 11.5–14.5)
EST. GFR  (NO RACE VARIABLE): >60 ML/MIN/1.73 M^2
GLUCOSE SERPL-MCNC: 97 MG/DL (ref 70–110)
HCT VFR BLD AUTO: 23.8 % (ref 37–48.5)
HGB BLD-MCNC: 7.7 G/DL (ref 12–16)
IMM GRANULOCYTES # BLD AUTO: 0.04 K/UL (ref 0–0.04)
IMM GRANULOCYTES NFR BLD AUTO: 0.4 % (ref 0–0.5)
LYMPHOCYTES # BLD AUTO: 2.8 K/UL (ref 1–4.8)
LYMPHOCYTES NFR BLD: 25.5 % (ref 18–48)
MCH RBC QN AUTO: 23.1 PG (ref 27–31)
MCHC RBC AUTO-ENTMCNC: 32.4 G/DL (ref 32–36)
MCV RBC AUTO: 72 FL (ref 82–98)
MONOCYTES # BLD AUTO: 1 K/UL (ref 0.3–1)
MONOCYTES NFR BLD: 9.5 % (ref 4–15)
NEUTROPHILS # BLD AUTO: 6.5 K/UL (ref 1.8–7.7)
NEUTROPHILS NFR BLD: 60.2 % (ref 38–73)
NRBC BLD-RTO: 2 /100 WBC
PLATELET # BLD AUTO: 480 K/UL (ref 150–450)
PMV BLD AUTO: 9.2 FL (ref 9.2–12.9)
POTASSIUM SERPL-SCNC: 4.3 MMOL/L (ref 3.5–5.1)
PROT SERPL-MCNC: 6.7 G/DL (ref 6–8.4)
RBC # BLD AUTO: 3.33 M/UL (ref 4–5.4)
RETICS/RBC NFR AUTO: 1.1 % (ref 0.5–2.5)
SODIUM SERPL-SCNC: 139 MMOL/L (ref 136–145)
WBC # BLD AUTO: 10.8 K/UL (ref 3.9–12.7)

## 2025-02-20 PROCEDURE — 63600175 PHARM REV CODE 636 W HCPCS: Performed by: STUDENT IN AN ORGANIZED HEALTH CARE EDUCATION/TRAINING PROGRAM

## 2025-02-20 PROCEDURE — 11000001 HC ACUTE MED/SURG PRIVATE ROOM

## 2025-02-20 PROCEDURE — 85045 AUTOMATED RETICULOCYTE COUNT: CPT | Performed by: STUDENT IN AN ORGANIZED HEALTH CARE EDUCATION/TRAINING PROGRAM

## 2025-02-20 PROCEDURE — 25000003 PHARM REV CODE 250

## 2025-02-20 PROCEDURE — 25000003 PHARM REV CODE 250: Performed by: STUDENT IN AN ORGANIZED HEALTH CARE EDUCATION/TRAINING PROGRAM

## 2025-02-20 PROCEDURE — 80053 COMPREHEN METABOLIC PANEL: CPT

## 2025-02-20 PROCEDURE — 85025 COMPLETE CBC W/AUTO DIFF WBC: CPT

## 2025-02-20 RX ORDER — BENZONATATE 100 MG/1
100 CAPSULE ORAL 3 TIMES DAILY PRN
Status: DISCONTINUED | OUTPATIENT
Start: 2025-02-20 | End: 2025-02-22 | Stop reason: HOSPADM

## 2025-02-20 RX ORDER — GUAIFENESIN 600 MG/1
600 TABLET, EXTENDED RELEASE ORAL 2 TIMES DAILY
Status: DISCONTINUED | OUTPATIENT
Start: 2025-02-20 | End: 2025-02-22 | Stop reason: HOSPADM

## 2025-02-20 RX ADMIN — SUCRALFATE 1 G: 1 TABLET ORAL at 09:02

## 2025-02-20 RX ADMIN — FOLIC ACID 1 MG: 1 TABLET ORAL at 09:02

## 2025-02-20 RX ADMIN — METOCLOPRAMIDE 5 MG: 5 TABLET ORAL at 06:02

## 2025-02-20 RX ADMIN — METOCLOPRAMIDE 5 MG: 5 TABLET ORAL at 04:02

## 2025-02-20 RX ADMIN — DICYCLOMINE HYDROCHLORIDE 10 MG: 10 CAPSULE ORAL at 04:02

## 2025-02-20 RX ADMIN — DICYCLOMINE HYDROCHLORIDE 10 MG: 10 CAPSULE ORAL at 09:02

## 2025-02-20 RX ADMIN — PANTOPRAZOLE SODIUM 40 MG: 40 TABLET, DELAYED RELEASE ORAL at 09:02

## 2025-02-20 RX ADMIN — FLUOXETINE HYDROCHLORIDE 40 MG: 20 CAPSULE ORAL at 09:02

## 2025-02-20 RX ADMIN — TIZANIDINE 4 MG: 2 TABLET ORAL at 09:02

## 2025-02-20 RX ADMIN — POLYETHYLENE GLYCOL 3350 17 G: 17 POWDER, FOR SOLUTION ORAL at 09:02

## 2025-02-20 RX ADMIN — GABAPENTIN 600 MG: 300 CAPSULE ORAL at 09:02

## 2025-02-20 RX ADMIN — Medication: at 04:02

## 2025-02-20 RX ADMIN — DIPHENHYDRAMINE HYDROCHLORIDE 25 MG: 25 CAPSULE ORAL at 04:02

## 2025-02-20 RX ADMIN — GABAPENTIN 600 MG: 300 CAPSULE ORAL at 03:02

## 2025-02-20 RX ADMIN — DIPHENHYDRAMINE HYDROCHLORIDE 25 MG: 25 CAPSULE ORAL at 09:02

## 2025-02-20 RX ADMIN — AMLODIPINE BESYLATE 10 MG: 10 TABLET ORAL at 09:02

## 2025-02-20 RX ADMIN — ACETAMINOPHEN 1000 MG: 500 TABLET ORAL at 09:02

## 2025-02-20 RX ADMIN — DICYCLOMINE HYDROCHLORIDE 10 MG: 10 CAPSULE ORAL at 06:02

## 2025-02-20 RX ADMIN — GUAIFENESIN 600 MG: 600 TABLET, EXTENDED RELEASE ORAL at 09:02

## 2025-02-20 RX ADMIN — TIZANIDINE 4 MG: 2 TABLET ORAL at 06:02

## 2025-02-20 RX ADMIN — SUCRALFATE 1 G: 1 TABLET ORAL at 06:02

## 2025-02-20 RX ADMIN — ACETAMINOPHEN 1000 MG: 500 TABLET ORAL at 06:02

## 2025-02-20 RX ADMIN — Medication: at 01:02

## 2025-02-20 RX ADMIN — TIZANIDINE 4 MG: 2 TABLET ORAL at 03:02

## 2025-02-20 RX ADMIN — MUPIROCIN: 20 OINTMENT TOPICAL at 09:02

## 2025-02-20 RX ADMIN — APIXABAN 5 MG: 5 TABLET, FILM COATED ORAL at 09:02

## 2025-02-20 RX ADMIN — SUCRALFATE 1 G: 1 TABLET ORAL at 04:02

## 2025-02-20 RX ADMIN — Medication: at 06:02

## 2025-02-20 RX ADMIN — DICYCLOMINE HYDROCHLORIDE 10 MG: 10 CAPSULE ORAL at 10:02

## 2025-02-20 RX ADMIN — HYDROXYUREA 1000 MG: 500 CAPSULE ORAL at 09:02

## 2025-02-20 RX ADMIN — SUCRALFATE 1 G: 1 TABLET ORAL at 10:02

## 2025-02-20 RX ADMIN — BENZONATATE 100 MG: 100 CAPSULE ORAL at 12:02

## 2025-02-20 RX ADMIN — METOCLOPRAMIDE 5 MG: 5 TABLET ORAL at 10:02

## 2025-02-20 RX ADMIN — ACETAMINOPHEN 1000 MG: 500 TABLET ORAL at 03:02

## 2025-02-20 NOTE — ASSESSMENT & PLAN NOTE
Patient's blood pressure range in the last 24 hours was: BP  Min: 107/75  Max: 139/87.The patient's inpatient anti-hypertensive regimen is listed below:  Current Antihypertensives  amLODIPine tablet 10 mg, Daily, Oral    Plan  - BP waxes and wanes: if consistently high with pain control will consider adjustment in regimen  - Will control pain as well

## 2025-02-20 NOTE — ASSESSMENT & PLAN NOTE
2 days cough, congestion, HA, fever  - Flu/Covid negative  - Stable on room air   - CXR without consolidation; Procal neg  - Supportive care  - Monitor CBC   - Respiratory panel negative   -schedule mucolytic, added tessalon perles   -if worsening respiratory requirements will repeat CXR

## 2025-02-20 NOTE — PROGRESS NOTES
[facility]  Progress Note    Patient Name: Nzaanin Malone  MRN: [id number]  Patient Class: IP- Inpatient   Admission Date: 2/16/2025  Length of Stay: 3 days  Attending Physician: Deb Quiroz MD  Primary Care Provider: Kezia, Primary Doctor        Subjective     Principal Problem:Sickle cell disease, type S beta-zero thalassemia with crisis        HPI:  Nazanin Malone is a 44 y.o. female with PMHx of sickle cell beta thalassemia zero, multiple pulmonary emboli on chronic AC, asthma, and HTN presenting with pain and URI. Reports acute pain to the bilateral knees and hips that feels like a sickle cell pain crisis for the last 2 days. She has been taking her gabapentin, tizanadine, and norco without relief.  Of note, she ran out of MS contin a few days ago, around time of symptom onset.  She reports there was an issue with her insurance coverage after a dosing change to the MS contin from BID to TID, though she believes this is now fixed.  She has had upper respiratory symptoms the last couple of days with nasal congestion, sore throat, headache. She has a dry cough and feels slightly SOB from all of the coughing. She had a fever last night 101F.  No chest pain, diarrhea, dysuria. She has chronic issue with abdominal pain nausea/vomiting that she says is from gastritis and she takes Bentyl, Reglan, Carafate for this which helps.  Saw her hematologist last Tuesday. She is able to ambualt despite pain.     ED: hypertensive otherwise HDS afebrile. Hbg 9.6.  Reticulocytes normal  2.2%.T bili normal.  WBC 11k. Plts 508. Lipase normal. CMP unremarkable. Flu and Covid negative. CXR with no acute cardiopulmonary process. She was given IV hydromorphone 2 mg IV x2, tylenol, benadryl, 1L LR. Admitted to  for management of pain.     Overview/Hospital Course:  Patient with initial poor rapport with physician as displeasure with pain regimen despite being highly sedated and requesting  significantly more IV pain medications.  Patient now desires to keep same physician.  Will switch pain regimen to PCA through shared decision-making.  Endorsed cough with productive sputum and now obtaining nasopharyngeal pathogen panel.    Interval History: Pt seen and examined this morning on rounds. NAEON. Still having pain but hoping to wean off PCA tomorrow. Coughing up phlegm and would like a mucolytic. Discussed antibiotics would not be helpful int URI without findings of PNA. Care plan reviewed. Otherwise, doing well and with no further complaints at this time.        Objective:     Vital Signs (Most Recent):  Temp: 98.4 °F (36.9 °C) (02/20/25 0838)  Pulse: 68 (02/20/25 0853)  Resp: 18 (02/20/25 0838)  BP: 107/75 (02/20/25 0838)  SpO2: 97 % (02/20/25 0838) Vital Signs (24h Range):  Temp:  [98.4 °F (36.9 °C)-98.8 °F (37.1 °C)] 98.4 °F (36.9 °C)  Pulse:  [66-88] 68  Resp:  [18] 18  SpO2:  [97 %-100 %] 97 %  BP: (107-139)/(67-87) 107/75     Weight: 103.1 kg (227 lb 4.7 oz)  Body mass index is 41.57 kg/m².    Intake/Output Summary (Last 24 hours) at 2/20/2025 0943  Last data filed at 2/19/2025 1818  Gross per 24 hour   Intake 680 ml   Output --   Net 680 ml         Physical Exam  Constitutional:       Appearance: Normal appearance.   HENT:      Head: Normocephalic and atraumatic.      Nose: Nose normal.      Mouth/Throat:      Mouth: Mucous membranes are moist.   Eyes:      Extraocular Movements: Extraocular movements intact.      Pupils: Pupils are equal, round, and reactive to light.   Cardiovascular:      Rate and Rhythm: Normal rate and regular rhythm.      Heart sounds: No murmur heard.     No gallop.   Pulmonary:      Effort: Pulmonary effort is normal.      Breath sounds: Normal breath sounds. No wheezing or rales.   Abdominal:      General: Abdomen is flat. Bowel sounds are normal. There is no distension.      Palpations: Abdomen is soft.      Tenderness: There is no abdominal tenderness.    Musculoskeletal:         General: No swelling or tenderness. Normal range of motion.      Cervical back: Normal range of motion and neck supple.      Right lower leg: No edema.      Left lower leg: No edema.   Skin:     General: Skin is warm and dry.      Capillary Refill: Capillary refill takes less than 2 seconds.   Neurological:      General: No focal deficit present.      Mental Status: She is alert. Mental status is at baseline.   Psychiatric:         Mood and Affect: Mood normal.             Significant Labs: All pertinent labs within the past 24 hours have been reviewed.    Significant Imaging: I have reviewed all pertinent imaging results/findings within the past 24 hours.    Assessment and Plan     * Sickle cell disease, type S beta-zero thalassemia with crisis  - Presenting with acute bilateral hip and knee pain & URI  - Retic count normal. TB normal.  Hbg 9.6.   - CXR without consolidation.Stable on room air. Flu/covid negative.   - Given 2 mg IV dilaudid in ED without relief of pain. Also given 1 L fluids   - Continue home gabapentin, tizanidine  - Continue home hydroxyurea and folic acid  - Restarted home MS contin 30 mg (recently increased from Q12h to Q8h per pt) - she has been out for a few days which is likely exacerbating her current pain  - Changed home norco 10 mg q4h prn to scheduled tylenol 1 g q8h with oxycodone 10 mg q4h for moderate pain, 15 mg q4h for severe pain, and 0.5 mg IV hydromorphone breakthrough with initial increase to 1 mg dilaudid but less frequent  - transitioned to PCA for analgesia and patient tolerating well, will wean off vs decrease tomorrow     History of pulmonary embolism  - Continue home eliquis 5 mg BID; patient has been off of it for several weeks due to cost; efforts to ensure she can receive and afford at discharge  - given history of pulmonary embolism, lack of Eliquis for extended period, and chest pain (though reproducible on exam), low threshold for CT PE  evaluation if indicated      Hypertension  Patient's blood pressure range in the last 24 hours was: BP  Min: 107/75  Max: 139/87.The patient's inpatient anti-hypertensive regimen is listed below:  Current Antihypertensives  amLODIPine tablet 10 mg, Daily, Oral    Plan  - BP waxes and wanes: if consistently high with pain control will consider adjustment in regimen  - Will control pain as well    Chronic gastritis  Continue home PPI and carafate     Upper respiratory infection  2 days cough, congestion, HA, fever  - Flu/Covid negative  - Stable on room air   - CXR without consolidation; Procal neg  - Supportive care  - Monitor CBC   - Respiratory panel negative   -schedule mucolytic, added tessalon perles   -if worsening respiratory requirements will repeat CXR     Abdominal pain  - Chronic, stable   - Continue home Bentyl, Carafate, Reglan     Anemia  Anemia is likely due to sickle cell-beta thalassemia . Most recent hemoglobin and hematocrit are listed below.  Recent Labs     02/18/25  0452 02/19/25  0838 02/20/25  0433   HGB 8.5* 8.0* 7.7*   HCT 26.4* 24.3* 23.8*     Plan  - Monitor serial CBC: Daily  - Transfuse PRBC if patient becomes hemodynamically unstable, symptomatic or H/H drops below 7/21.  - Patient has not received any PRBC transfusions to date  - Patient's anemia is currently stable    Morbid obesity  Body mass index is 41.57 kg/m². Morbid obesity complicates all aspects of disease management from diagnostic modalities to treatment. Weight loss encouraged and health benefits explained to patient.         Anxiety and depression  - Continue home fluoxetine      VTE Risk Mitigation (From admission, onward)           Ordered     apixaban tablet 5 mg  2 times daily         02/17/25 0602     IP VTE HIGH RISK PATIENT  Once         02/17/25 0236     Place sequential compression device  Until discontinued         02/17/25 0236     Reason for No Pharmacological VTE Prophylaxis  Once        Question:  Reasons:   Answer:  Already adequately anticoagulated on oral Anticoagulants    02/17/25 0236                    Discharge Planning   ALYSE: 2/22/2025     Code Status: Full Code   Medical Readiness for Discharge Date:   Discharge Plan A: Home                        Deb Quiroz MD  Department of Hospital Medicine   [facility]

## 2025-02-20 NOTE — ASSESSMENT & PLAN NOTE
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 Most recent hemoglobin and hematocrit are listed below.  Recent Labs     02/18/25  0452 02/19/25  0838 02/20/25  0433   HGB 8.5* 8.0* 7.7*   HCT 26.4* 24.3* 23.8*     Plan  - Monitor serial CBC: Daily  - Transfuse PRBC if patient becomes hemodynamically unstable, symptomatic or H/H drops below 7/21.  - Patient has not received any PRBC transfusions to date  - Patient's anemia is currently stable

## 2025-02-20 NOTE — ASSESSMENT & PLAN NOTE
- Presenting with acute bilateral hip and knee pain & URI  - Retic count normal. TB normal.  Hbg 9.6.   - CXR without consolidation.Stable on room air. Flu/covid negative.   - Given 2 mg IV dilaudid in ED without relief of pain. Also given 1 L fluids   - Continue home gabapentin, tizanidine  - Continue home hydroxyurea and folic acid  - Restarted home MS contin 30 mg (recently increased from Q12h to Q8h per pt) - she has been out for a few days which is likely exacerbating her current pain  - Changed home norco 10 mg q4h prn to scheduled tylenol 1 g q8h with oxycodone 10 mg q4h for moderate pain, 15 mg q4h for severe pain, and 0.5 mg IV hydromorphone breakthrough with initial increase to 1 mg dilaudid but less frequent  - transitioned to PCA for analgesia and patient tolerating well, will wean off vs decrease tomorrow

## 2025-02-20 NOTE — SUBJECTIVE & OBJECTIVE
Interval History: Pt seen and examined this morning on rounds. SANCHO. Still having pain but hoping to wean off PCA tomorrow. Coughing up phlegm and would like a mucolytic. Discussed antibiotics would not be helpful int URI without findings of PNA. Care plan reviewed. Otherwise, doing well and with no further complaints at this time.        Objective:     Vital Signs (Most Recent):  Temp: 98.4 °F (36.9 °C) (02/20/25 0838)  Pulse: 68 (02/20/25 0853)  Resp: 18 (02/20/25 0838)  BP: 107/75 (02/20/25 0838)  SpO2: 97 % (02/20/25 0838) Vital Signs (24h Range):  Temp:  [98.4 °F (36.9 °C)-98.8 °F (37.1 °C)] 98.4 °F (36.9 °C)  Pulse:  [66-88] 68  Resp:  [18] 18  SpO2:  [97 %-100 %] 97 %  BP: (107-139)/(67-87) 107/75     Weight: 103.1 kg (227 lb 4.7 oz)  Body mass index is 41.57 kg/m².    Intake/Output Summary (Last 24 hours) at 2/20/2025 0943  Last data filed at 2/19/2025 1818  Gross per 24 hour   Intake 680 ml   Output --   Net 680 ml         Physical Exam  Constitutional:       Appearance: Normal appearance.   HENT:      Head: Normocephalic and atraumatic.      Nose: Nose normal.      Mouth/Throat:      Mouth: Mucous membranes are moist.   Eyes:      Extraocular Movements: Extraocular movements intact.      Pupils: Pupils are equal, round, and reactive to light.   Cardiovascular:      Rate and Rhythm: Normal rate and regular rhythm.      Heart sounds: No murmur heard.     No gallop.   Pulmonary:      Effort: Pulmonary effort is normal.      Breath sounds: Normal breath sounds. No wheezing or rales.   Abdominal:      General: Abdomen is flat. Bowel sounds are normal. There is no distension.      Palpations: Abdomen is soft.      Tenderness: There is no abdominal tenderness.   Musculoskeletal:         General: No swelling or tenderness. Normal range of motion.      Cervical back: Normal range of motion and neck supple.      Right lower leg: No edema.      Left lower leg: No edema.   Skin:     General: Skin is warm and dry.       Capillary Refill: Capillary refill takes less than 2 seconds.   Neurological:      General: No focal deficit present.      Mental Status: She is alert. Mental status is at baseline.   Psychiatric:         Mood and Affect: Mood normal.             Significant Labs: All pertinent labs within the past 24 hours have been reviewed.    Significant Imaging: I have reviewed all pertinent imaging results/findings within the past 24 hours.

## 2025-02-21 LAB
ALBUMIN SERPL BCP-MCNC: 3.2 G/DL (ref 3.5–5.2)
ALP SERPL-CCNC: 86 U/L (ref 40–150)
ALT SERPL W/O P-5'-P-CCNC: 9 U/L (ref 10–44)
ANION GAP SERPL CALC-SCNC: 7 MMOL/L (ref 8–16)
AST SERPL-CCNC: 21 U/L (ref 10–40)
BILIRUB SERPL-MCNC: 0.3 MG/DL (ref 0.1–1)
BUN SERPL-MCNC: 18 MG/DL (ref 6–20)
CALCIUM SERPL-MCNC: 9.4 MG/DL (ref 8.7–10.5)
CHLORIDE SERPL-SCNC: 103 MMOL/L (ref 95–110)
CO2 SERPL-SCNC: 29 MMOL/L (ref 23–29)
CREAT SERPL-MCNC: 1 MG/DL (ref 0.5–1.4)
EST. GFR  (NO RACE VARIABLE): >60 ML/MIN/1.73 M^2
GLUCOSE SERPL-MCNC: 78 MG/DL (ref 70–110)
POTASSIUM SERPL-SCNC: 4.3 MMOL/L (ref 3.5–5.1)
PROCALCITONIN SERPL IA-MCNC: 0.03 NG/ML
PROT SERPL-MCNC: 7 G/DL (ref 6–8.4)
SODIUM SERPL-SCNC: 139 MMOL/L (ref 136–145)

## 2025-02-21 PROCEDURE — 11000001 HC ACUTE MED/SURG PRIVATE ROOM

## 2025-02-21 PROCEDURE — 84145 PROCALCITONIN (PCT): CPT | Performed by: STUDENT IN AN ORGANIZED HEALTH CARE EDUCATION/TRAINING PROGRAM

## 2025-02-21 PROCEDURE — 25000003 PHARM REV CODE 250: Performed by: STUDENT IN AN ORGANIZED HEALTH CARE EDUCATION/TRAINING PROGRAM

## 2025-02-21 PROCEDURE — 80053 COMPREHEN METABOLIC PANEL: CPT

## 2025-02-21 PROCEDURE — 63600175 PHARM REV CODE 636 W HCPCS: Performed by: STUDENT IN AN ORGANIZED HEALTH CARE EDUCATION/TRAINING PROGRAM

## 2025-02-21 PROCEDURE — 87070 CULTURE OTHR SPECIMN AEROBIC: CPT | Performed by: STUDENT IN AN ORGANIZED HEALTH CARE EDUCATION/TRAINING PROGRAM

## 2025-02-21 PROCEDURE — 99900035 HC TECH TIME PER 15 MIN (STAT)

## 2025-02-21 PROCEDURE — 87205 SMEAR GRAM STAIN: CPT | Performed by: STUDENT IN AN ORGANIZED HEALTH CARE EDUCATION/TRAINING PROGRAM

## 2025-02-21 PROCEDURE — 25000003 PHARM REV CODE 250

## 2025-02-21 RX ADMIN — DICYCLOMINE HYDROCHLORIDE 10 MG: 10 CAPSULE ORAL at 11:02

## 2025-02-21 RX ADMIN — Medication: at 10:02

## 2025-02-21 RX ADMIN — DICYCLOMINE HYDROCHLORIDE 10 MG: 10 CAPSULE ORAL at 04:02

## 2025-02-21 RX ADMIN — GABAPENTIN 600 MG: 300 CAPSULE ORAL at 03:02

## 2025-02-21 RX ADMIN — BENZONATATE 100 MG: 100 CAPSULE ORAL at 09:02

## 2025-02-21 RX ADMIN — PANTOPRAZOLE SODIUM 40 MG: 40 TABLET, DELAYED RELEASE ORAL at 09:02

## 2025-02-21 RX ADMIN — SUCRALFATE 1 G: 1 TABLET ORAL at 04:02

## 2025-02-21 RX ADMIN — GUAIFENESIN 600 MG: 600 TABLET, EXTENDED RELEASE ORAL at 09:02

## 2025-02-21 RX ADMIN — ACETAMINOPHEN 1000 MG: 500 TABLET ORAL at 09:02

## 2025-02-21 RX ADMIN — Medication: at 02:02

## 2025-02-21 RX ADMIN — AMLODIPINE BESYLATE 10 MG: 10 TABLET ORAL at 09:02

## 2025-02-21 RX ADMIN — METOCLOPRAMIDE 5 MG: 5 TABLET ORAL at 11:02

## 2025-02-21 RX ADMIN — SUCRALFATE 1 G: 1 TABLET ORAL at 09:02

## 2025-02-21 RX ADMIN — ACETAMINOPHEN 1000 MG: 500 TABLET ORAL at 05:02

## 2025-02-21 RX ADMIN — HYDROXYUREA 1000 MG: 500 CAPSULE ORAL at 09:02

## 2025-02-21 RX ADMIN — FOLIC ACID 1 MG: 1 TABLET ORAL at 09:02

## 2025-02-21 RX ADMIN — TIZANIDINE 4 MG: 2 TABLET ORAL at 05:02

## 2025-02-21 RX ADMIN — METOCLOPRAMIDE 5 MG: 5 TABLET ORAL at 04:02

## 2025-02-21 RX ADMIN — POLYETHYLENE GLYCOL 3350 17 G: 17 POWDER, FOR SOLUTION ORAL at 09:02

## 2025-02-21 RX ADMIN — APIXABAN 5 MG: 5 TABLET, FILM COATED ORAL at 09:02

## 2025-02-21 RX ADMIN — SUCRALFATE 1 G: 1 TABLET ORAL at 05:02

## 2025-02-21 RX ADMIN — Medication: at 04:02

## 2025-02-21 RX ADMIN — ACETAMINOPHEN 1000 MG: 500 TABLET ORAL at 01:02

## 2025-02-21 RX ADMIN — Medication: at 09:02

## 2025-02-21 RX ADMIN — TIZANIDINE 4 MG: 2 TABLET ORAL at 09:02

## 2025-02-21 RX ADMIN — SUCRALFATE 1 G: 1 TABLET ORAL at 11:02

## 2025-02-21 RX ADMIN — TIZANIDINE 4 MG: 2 TABLET ORAL at 01:02

## 2025-02-21 RX ADMIN — DIPHENHYDRAMINE HYDROCHLORIDE 25 MG: 25 CAPSULE ORAL at 01:02

## 2025-02-21 RX ADMIN — GABAPENTIN 600 MG: 300 CAPSULE ORAL at 09:02

## 2025-02-21 RX ADMIN — DICYCLOMINE HYDROCHLORIDE 10 MG: 10 CAPSULE ORAL at 09:02

## 2025-02-21 RX ADMIN — FLUOXETINE HYDROCHLORIDE 40 MG: 20 CAPSULE ORAL at 09:02

## 2025-02-21 RX ADMIN — DICYCLOMINE HYDROCHLORIDE 10 MG: 10 CAPSULE ORAL at 05:02

## 2025-02-21 RX ADMIN — METOCLOPRAMIDE 5 MG: 5 TABLET ORAL at 05:02

## 2025-02-21 NOTE — RESPIRATORY THERAPY
"RAPID RESPONSE RESPIRATORY THERAPY ETCO2 CHECK         Time of visit: 1031     Code Status: Full Code   : 1980  Bed: 628/628 A:   MRN: [id number]  Time spent at the bedside: < 15 min    SITUATION    Evaluated patient for: ETCo2 compliance    BACKGROUND    Why is the patient in the hospital?: Sickle cell disease, type S beta-zero thalassemia with crisis    Patient has a past medical history of Abnormal Pap smear of cervix, 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, Asthma, Avascular necrosis of femur, Depression, History of pulmonary embolism, Hypertension, Morbid obesity, Opioid dependence, Pneumonia due to Streptococcus pneumoniae, Right lower lobe pneumonia, Sepsis due to Streptococcus pneumoniae, Sickle cell-beta thalassemia disease with pain, and Trigeminal neuralgia.    24 Hours Vitals Range:  Temp:  [98.2 °F (36.8 °C)-99.4 °F (37.4 °C)]   Pulse:  [75-96]   Resp:  [16-18]   BP: (103-131)/(58-84)   SpO2:  [96 %-99 %]     Labs:    Recent Labs     25  0838 25  0433 25  0452    139 139   K 4.1 4.3 4.3    103 103   CO2 29 30* 29   BUN 18 21* 18   CREATININE 1.0 1.1 1.0   * 97 78        No results for input(s): "PH", "PCO2", "PO2", "HCO3", "POCSATURATED", "BE" in the last 72 hours.    ASSESSMENT/INTERVENTIONS    No respiratory concerns at this time.      Last VS   Temp: 98.7 °F (37.1 °C) ( 1113)  Pulse: 91 (1113)  Resp: 18 (1113)  BP: 108/64 (1113)  SpO2: 97 % (1113)    Level of Consciousness: Level of Consciousness (AVPU): alert  Respiratory Effort: Respiratory Effort: Unlabored Expansion/Accessory Muscle Usage: Expansion/Accessory Muscles/Retractions: no retractions, no use of accessory muscles, expansion symmetric  All Lung Field Breath Sounds: All Lung Fields Breath Sounds: clear, equal bilaterally  MILLICENT Breath Sounds: clear  LLL Breath Sounds: clear, diminished  RUL Breath Sounds: clear  RLL Breath Sounds: clear, diminished  Is the " ETCO2 monitor on? Yes  Is the patient wearing a cannula? Yes  Are ETCO2 orders placed? Yes  Is the patient on a PCA pump? Yes  ETCO2 monitored: ETCO2 (mmHg): 60 mmHg  Ambu at bedside: Yes    Active Orders   Respiratory Care    END TIDAL CO2 MONITOR Q12H     Frequency: Q12H     Number of Occurrences: Until Specified    Inhalation Treatment Q4H PRN     Frequency: Q4H PRN     Number of Occurrences: Until Specified    Oxygen Continuous     Frequency: Continuous     Number of Occurrences: Until Specified     Order Questions:      Device type: Low flow      Device: Nasal Cannula (1- 5 Liters)      LPM: 2      Titrate O2 per Oxygen Titration Protocol: Yes      To maintain SpO2 goal of: >= 94%      Notify MD of: Inability to achieve desired SpO2; Sudden change in patient status and requires 20% increase in FiO2; Patient requires >60% FiO2    Pulse Oximetry Q Shift     Frequency: Q Shift     Number of Occurrences: Until Specified       RECOMMENDATIONS    We recommend: RRT Recs: Continue POC per primary team.    FOLLOW-UP    Please call back the Rapid Response RT, Cortney Cedeno RRT at x [phone] for any questions or concerns.

## 2025-02-21 NOTE — PROGRESS NOTES
[facility] Medicine  Progress Note    Patient Name: Nazanin Malone  MRN: [id number]  Patient Class: IP- Inpatient   Admission Date: 2/16/2025  Length of Stay: 4 days  Attending Physician: Deb Quiroz MD  Primary Care Provider: Kezia, Primary Doctor        Subjective     Principal Problem:Sickle cell disease, type S beta-zero thalassemia with crisis        HPI:  Nazanin Malone is a 44 y.o. female with PMHx of sickle cell beta thalassemia zero, multiple pulmonary emboli on chronic AC, asthma, and HTN presenting with pain and URI. Reports acute pain to the bilateral knees and hips that feels like a sickle cell pain crisis for the last 2 days. She has been taking her gabapentin, tizanadine, and norco without relief.  Of note, she ran out of MS contin a few days ago, around time of symptom onset.  She reports there was an issue with her insurance coverage after a dosing change to the MS contin from BID to TID, though she believes this is now fixed.  She has had upper respiratory symptoms the last couple of days with nasal congestion, sore throat, headache. She has a dry cough and feels slightly SOB from all of the coughing. She had a fever last night 101F.  No chest pain, diarrhea, dysuria. She has chronic issue with abdominal pain nausea/vomiting that she says is from gastritis and she takes Bentyl, Reglan, Carafate for this which helps.  Saw her hematologist last Tuesday. She is able to ambualt despite pain.     ED: hypertensive otherwise HDS afebrile. Hbg 9.6.  Reticulocytes normal  2.2%.T bili normal.  WBC 11k. Plts 508. Lipase normal. CMP unremarkable. Flu and Covid negative. CXR with no acute cardiopulmonary process. She was given IV hydromorphone 2 mg IV x2, tylenol, benadryl, 1L LR. Admitted to  for management of pain.     Overview/Hospital Course:  Patient with initial poor rapport with physician as displeasure with pain regimen despite being highly sedated and requesting  significantly more IV pain medications.  Patient now desires to keep same physician.  Will switch pain regimen to PCA through shared decision-making.  Endorsed cough with productive sputum and now obtaining nasopharyngeal pathogen panel.    Interval History: Pt seen and examined this morning on wallace. SANCHO. She is still coughing up phlegm and feels her shortness of breath is worse. Care plan reviewed. Otherwise, doing well and with no further complaints at this time.        Objective:     Vital Signs (Most Recent):  Temp: 98.7 °F (37.1 °C) (02/21/25 1113)  Pulse: 91 (02/21/25 1113)  Resp: 18 (02/21/25 1113)  BP: 108/64 (02/21/25 1113)  SpO2: 97 % (02/21/25 1113) Vital Signs (24h Range):  Temp:  [98.2 °F (36.8 °C)-99.4 °F (37.4 °C)] 98.7 °F (37.1 °C)  Pulse:  [75-96] 91  Resp:  [16-18] 18  SpO2:  [96 %-99 %] 97 %  BP: (103-131)/(58-84) 108/64     Weight: 103.1 kg (227 lb 4.7 oz)  Body mass index is 41.57 kg/m².    Intake/Output Summary (Last 24 hours) at 2/21/2025 1249  Last data filed at 2/20/2025 1927  Gross per 24 hour   Intake 760 ml   Output 5 ml   Net 755 ml         Physical Exam  Constitutional:       Appearance: Normal appearance.   HENT:      Head: Normocephalic and atraumatic.      Nose: Nose normal.      Mouth/Throat:      Mouth: Mucous membranes are moist.   Eyes:      Extraocular Movements: Extraocular movements intact.      Pupils: Pupils are equal, round, and reactive to light.   Cardiovascular:      Rate and Rhythm: Normal rate and regular rhythm.      Heart sounds: No murmur heard.     No gallop.   Pulmonary:      Effort: Pulmonary effort is normal.      Breath sounds: Normal breath sounds. No wheezing or rales.   Abdominal:      General: Abdomen is flat. Bowel sounds are normal. There is no distension.      Palpations: Abdomen is soft.      Tenderness: There is no abdominal tenderness.   Musculoskeletal:         General: No swelling or tenderness. Normal range of motion.      Cervical back: Normal  range of motion and neck supple.      Right lower leg: No edema.      Left lower leg: No edema.   Skin:     General: Skin is warm and dry.      Capillary Refill: Capillary refill takes less than 2 seconds.   Neurological:      General: No focal deficit present.      Mental Status: She is alert. Mental status is at baseline.   Psychiatric:         Mood and Affect: Mood normal.             Significant Labs: All pertinent labs within the past 24 hours have been reviewed.    Significant Imaging: I have reviewed all pertinent imaging results/findings within the past 24 hours.    Assessment and Plan     * Sickle cell disease, type S beta-zero thalassemia with crisis  - Presenting with acute bilateral hip and knee pain & URI  - Retic count normal. TB normal.  Hbg 9.6.   - CXR without consolidation.Stable on room air. Flu/covid negative.   - Given 2 mg IV dilaudid in ED without relief of pain. Also given 1 L fluids   - Continue home gabapentin, tizanidine  - Continue home hydroxyurea and folic acid  - Restarted home MS contin 30 mg (recently increased from Q12h to Q8h per pt) - she has been out for a few days which is likely exacerbating her current pain  - Changed home norco 10 mg q4h prn to scheduled tylenol 1 g q8h with oxycodone 10 mg q4h for moderate pain, 15 mg q4h for severe pain, and 0.5 mg IV hydromorphone breakthrough with initial increase to 1 mg dilaudid but less frequent  - transitioned to PCA for analgesia and patient tolerating well, plan to decrease/wean 2/22    History of pulmonary embolism  - Continue home eliquis 5 mg BID; patient has been off of it for several weeks due to cost; efforts to ensure she can receive and afford at discharge  - given history of pulmonary embolism, lack of Eliquis for extended period, and chest pain (though reproducible on exam), low threshold for CT PE evaluation if indicated      Hypertension  Patient's blood pressure range in the last 24 hours was: BP  Min: 107/75  Max:  139/87.The patient's inpatient anti-hypertensive regimen is listed below:  Current Antihypertensives  amLODIPine tablet 10 mg, Daily, Oral    Plan  - BP waxes and wanes: if consistently high with pain control will consider adjustment in regimen  - Will control pain as well    Chronic gastritis  Continue home PPI and carafate     Upper respiratory infection  2 days cough, congestion, HA, fever  - Flu/Covid negative  - Stable on room air   - CXR without consolidation; Procal neg  - Supportive care  - Monitor CBC   - Respiratory panel negative   -schedule mucolytic, added tessalon perles   -Repeat CXR only with atelectasis, no consolidation   -repeat procal negative, sputum ordered  -hold on abx     Abdominal pain  - Chronic, stable   - Continue home Bentyl, Carafate, Reglan     Anemia  Anemia is likely due to sickle cell-beta thalassemia . Most recent hemoglobin and hematocrit are listed below.  Recent Labs     02/19/25  0838 02/20/25  0433   HGB 8.0* 7.7*   HCT 24.3* 23.8*     Plan  - Monitor serial CBC: Daily  - Transfuse PRBC if patient becomes hemodynamically unstable, symptomatic or H/H drops below 7/21.  - Patient has not received any PRBC transfusions to date  - Patient's anemia is currently stable    Morbid obesity  Body mass index is 41.57 kg/m². Morbid obesity complicates all aspects of disease management from diagnostic modalities to treatment. Weight loss encouraged and health benefits explained to patient.         Anxiety and depression  - Continue home fluoxetine      VTE Risk Mitigation (From admission, onward)           Ordered     apixaban tablet 5 mg  2 times daily         02/17/25 0602     IP VTE HIGH RISK PATIENT  Once         02/17/25 0236     Place sequential compression device  Until discontinued         02/17/25 0236     Reason for No Pharmacological VTE Prophylaxis  Once        Question:  Reasons:  Answer:  Already adequately anticoagulated on oral Anticoagulants    02/17/25 0236                     Discharge Planning   ALYSE: 2/24/2025     Code Status: Full Code   Medical Readiness for Discharge Date:   Discharge Plan A: Home                        Deb Quiroz MD  Department of Hospital Medicine   [facility]

## 2025-02-21 NOTE — ASSESSMENT & PLAN NOTE
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 Most recent hemoglobin and hematocrit are listed below.  Recent Labs     02/19/25  0838 02/20/25  0433   HGB 8.0* 7.7*   HCT 24.3* 23.8*     Plan  - Monitor serial CBC: Daily  - Transfuse PRBC if patient becomes hemodynamically unstable, symptomatic or H/H drops below 7/21.  - Patient has not received any PRBC transfusions to date  - Patient's anemia is currently stable

## 2025-02-21 NOTE — PLAN OF CARE
[facility] - Med Surg  Discharge Reassessment    Primary Care Provider: Kezia, Primary Doctor    Expected Discharge Date: 2/24/2025      Patient is not medically ready for discharge at this time. Patient will dc home with no needs.     Discharge Plan A and Plan B have been determined by review of patient's clinical status, future medical and therapeutic needs, and coverage/benefits for post-acute care in coordination with multidisciplinary team members.    Reassessment (most recent)       Discharge Reassessment - 02/21/25 1507          Discharge Reassessment    Assessment Type Discharge Planning Reassessment (P)      Did the patient's condition or plan change since previous assessment? No (P)      Discharge Plan discussed with: Patient (P)      Communicated ALYSE with patient/caregiver Yes (P)      Discharge Plan A Home with family (P)      Discharge Plan B Home (P)      DME Needed Upon Discharge  none (P)      Transition of Care Barriers None (P)      Why the patient remains in the hospital Requires continued medical care (P)         Post-Acute Status    Post-Acute Authorization Other (P)      Coverage [employer] MANAGED MEDICARE - [employer] MEDICARE PLAN HMO (P)      Other Status No Post-Acute Service Needs (P)      Discharge Delays None known at this time (P)                    DARRION Nichole  Case Management  [phone]

## 2025-02-21 NOTE — ASSESSMENT & PLAN NOTE
- Presenting with acute bilateral hip and knee pain & URI  - Retic count normal. TB normal.  Hbg 9.6.   - CXR without consolidation.Stable on room air. Flu/covid negative.   - Given 2 mg IV dilaudid in ED without relief of pain. Also given 1 L fluids   - Continue home gabapentin, tizanidine  - Continue home hydroxyurea and folic acid  - Restarted home MS contin 30 mg (recently increased from Q12h to Q8h per pt) - she has been out for a few days which is likely exacerbating her current pain  - Changed home norco 10 mg q4h prn to scheduled tylenol 1 g q8h with oxycodone 10 mg q4h for moderate pain, 15 mg q4h for severe pain, and 0.5 mg IV hydromorphone breakthrough with initial increase to 1 mg dilaudid but less frequent  - transitioned to PCA for analgesia and patient tolerating well, plan to decrease/wean 2/22

## 2025-02-21 NOTE — ASSESSMENT & PLAN NOTE
2 days cough, congestion, HA, fever  - Flu/Covid negative  - Stable on room air   - CXR without consolidation; Procal neg  - Supportive care  - Monitor CBC   - Respiratory panel negative   -schedule mucolytic, added tessalon perles   -Repeat CXR only with atelectasis, no consolidation   -repeat procal negative, sputum ordered  -hold on abx

## 2025-02-21 NOTE — SUBJECTIVE & OBJECTIVE
Interval History: Pt seen and examined this morning on wallaceDelano [facility]. She is still coughing up phlegm and feels her shortness of breath is worse. Care plan reviewed. Otherwise, doing well and with no further complaints at this time.        Objective:     Vital Signs (Most Recent):  Temp: 98.7 °F (37.1 °C) (02/21/25 1113)  Pulse: 91 (02/21/25 1113)  Resp: 18 (02/21/25 1113)  BP: 108/64 (02/21/25 1113)  SpO2: 97 % (02/21/25 1113) Vital Signs (24h Range):  Temp:  [98.2 °F (36.8 °C)-99.4 °F (37.4 °C)] 98.7 °F (37.1 °C)  Pulse:  [75-96] 91  Resp:  [16-18] 18  SpO2:  [96 %-99 %] 97 %  BP: (103-131)/(58-84) 108/64     Weight: 103.1 kg (227 lb 4.7 oz)  Body mass index is 41.57 kg/m².    Intake/Output Summary (Last 24 hours) at 2/21/2025 1249  Last data filed at 2/20/2025 1927  Gross per 24 hour   Intake 760 ml   Output 5 ml   Net 755 ml         Physical Exam  Constitutional:       Appearance: Normal appearance.   HENT:      Head: Normocephalic and atraumatic.      Nose: Nose normal.      Mouth/Throat:      Mouth: Mucous membranes are moist.   Eyes:      Extraocular Movements: Extraocular movements intact.      Pupils: Pupils are equal, round, and reactive to light.   Cardiovascular:      Rate and Rhythm: Normal rate and regular rhythm.      Heart sounds: No murmur heard.     No gallop.   Pulmonary:      Effort: Pulmonary effort is normal.      Breath sounds: Normal breath sounds. No wheezing or rales.   Abdominal:      General: Abdomen is flat. Bowel sounds are normal. There is no distension.      Palpations: Abdomen is soft.      Tenderness: There is no abdominal tenderness.   Musculoskeletal:         General: No swelling or tenderness. Normal range of motion.      Cervical back: Normal range of motion and neck supple.      Right lower leg: No edema.      Left lower leg: No edema.   Skin:     General: Skin is warm and dry.      Capillary Refill: Capillary refill takes less than 2 seconds.   Neurological:      General: No  focal deficit present.      Mental Status: She is alert. Mental status is at baseline.   Psychiatric:         Mood and Affect: Mood normal.             Significant Labs: All pertinent labs within the past 24 hours have been reviewed.    Significant Imaging: I have reviewed all pertinent imaging results/findings within the past 24 hours.

## 2025-02-22 VITALS
RESPIRATION RATE: 17 BRPM | HEART RATE: 81 BPM | BODY MASS INDEX: 41.83 KG/M2 | TEMPERATURE: 98 F | OXYGEN SATURATION: 97 % | HEIGHT: 62 IN | SYSTOLIC BLOOD PRESSURE: 121 MMHG | DIASTOLIC BLOOD PRESSURE: 82 MMHG | WEIGHT: 227.31 LBS

## 2025-02-22 LAB
ALBUMIN SERPL BCP-MCNC: 3.2 G/DL (ref 3.5–5.2)
ALP SERPL-CCNC: 90 U/L (ref 40–150)
ALT SERPL W/O P-5'-P-CCNC: 7 U/L (ref 10–44)
ANION GAP SERPL CALC-SCNC: 6 MMOL/L (ref 8–16)
AST SERPL-CCNC: 34 U/L (ref 10–40)
BILIRUB SERPL-MCNC: 0.3 MG/DL (ref 0.1–1)
BUN SERPL-MCNC: 17 MG/DL (ref 6–20)
CALCIUM SERPL-MCNC: 9 MG/DL (ref 8.7–10.5)
CHLORIDE SERPL-SCNC: 100 MMOL/L (ref 95–110)
CO2 SERPL-SCNC: 32 MMOL/L (ref 23–29)
CREAT SERPL-MCNC: 1 MG/DL (ref 0.5–1.4)
ERYTHROCYTE [DISTWIDTH] IN BLOOD BY AUTOMATED COUNT: 24.3 % (ref 11.5–14.5)
EST. GFR  (NO RACE VARIABLE): >60 ML/MIN/1.73 M^2
GLUCOSE SERPL-MCNC: 105 MG/DL (ref 70–110)
HCT VFR BLD AUTO: 25.7 % (ref 37–48.5)
HGB BLD-MCNC: 8.3 G/DL (ref 12–16)
MCH RBC QN AUTO: 23.1 PG (ref 27–31)
MCHC RBC AUTO-ENTMCNC: 32.3 G/DL (ref 32–36)
MCV RBC AUTO: 71 FL (ref 82–98)
PLATELET # BLD AUTO: 509 K/UL (ref 150–450)
PMV BLD AUTO: 8.8 FL (ref 9.2–12.9)
POTASSIUM SERPL-SCNC: 4.1 MMOL/L (ref 3.5–5.1)
PROT SERPL-MCNC: 7.2 G/DL (ref 6–8.4)
RBC # BLD AUTO: 3.6 M/UL (ref 4–5.4)
SODIUM SERPL-SCNC: 138 MMOL/L (ref 136–145)
WBC # BLD AUTO: 10.12 K/UL (ref 3.9–12.7)

## 2025-02-22 PROCEDURE — 63600175 PHARM REV CODE 636 W HCPCS: Performed by: STUDENT IN AN ORGANIZED HEALTH CARE EDUCATION/TRAINING PROGRAM

## 2025-02-22 PROCEDURE — 85027 COMPLETE CBC AUTOMATED: CPT | Performed by: STUDENT IN AN ORGANIZED HEALTH CARE EDUCATION/TRAINING PROGRAM

## 2025-02-22 PROCEDURE — 80053 COMPREHEN METABOLIC PANEL: CPT | Performed by: STUDENT IN AN ORGANIZED HEALTH CARE EDUCATION/TRAINING PROGRAM

## 2025-02-22 PROCEDURE — 94761 N-INVAS EAR/PLS OXIMETRY MLT: CPT

## 2025-02-22 PROCEDURE — 25000003 PHARM REV CODE 250: Performed by: STUDENT IN AN ORGANIZED HEALTH CARE EDUCATION/TRAINING PROGRAM

## 2025-02-22 PROCEDURE — 99900035 HC TECH TIME PER 15 MIN (STAT)

## 2025-02-22 PROCEDURE — 25000003 PHARM REV CODE 250

## 2025-02-22 PROCEDURE — 27000221 HC OXYGEN, UP TO 24 HOURS

## 2025-02-22 RX ORDER — ENOXAPARIN SODIUM 100 MG/ML
40 INJECTION SUBCUTANEOUS EVERY 12 HOURS
Status: DISCONTINUED | OUTPATIENT
Start: 2025-02-22 | End: 2025-02-22 | Stop reason: HOSPADM

## 2025-02-22 RX ORDER — HEPARIN 100 UNIT/ML
5 SYRINGE INTRAVENOUS
Status: DISCONTINUED | OUTPATIENT
Start: 2025-02-22 | End: 2025-02-22 | Stop reason: HOSPADM

## 2025-02-22 RX ORDER — ENOXAPARIN SODIUM 100 MG/ML
40 INJECTION SUBCUTANEOUS EVERY 12 HOURS
Qty: 48 ML | Refills: 0 | Status: ON HOLD | OUTPATIENT
Start: 2025-02-22 | End: 2025-04-23

## 2025-02-22 RX ORDER — GUAIFENESIN 600 MG/1
600 TABLET, EXTENDED RELEASE ORAL 2 TIMES DAILY
Qty: 20 TABLET | Refills: 0 | Status: ON HOLD | OUTPATIENT
Start: 2025-02-22 | End: 2025-03-04

## 2025-02-22 RX ORDER — TIZANIDINE 4 MG/1
4 TABLET ORAL EVERY 8 HOURS
Qty: 30 TABLET | Refills: 0 | Status: ON HOLD | OUTPATIENT
Start: 2025-02-22 | End: 2025-03-10

## 2025-02-22 RX ORDER — SENNOSIDES 8.6 MG/1
8.6 TABLET ORAL DAILY
Status: DISCONTINUED | OUTPATIENT
Start: 2025-02-22 | End: 2025-02-22 | Stop reason: HOSPADM

## 2025-02-22 RX ADMIN — METOCLOPRAMIDE 5 MG: 5 TABLET ORAL at 04:02

## 2025-02-22 RX ADMIN — LACTULOSE 15 G: 20 SOLUTION ORAL at 03:02

## 2025-02-22 RX ADMIN — Medication: at 10:02

## 2025-02-22 RX ADMIN — TIZANIDINE 4 MG: 2 TABLET ORAL at 01:02

## 2025-02-22 RX ADMIN — ACETAMINOPHEN 1000 MG: 500 TABLET ORAL at 01:02

## 2025-02-22 RX ADMIN — TIZANIDINE 4 MG: 2 TABLET ORAL at 07:02

## 2025-02-22 RX ADMIN — DICYCLOMINE HYDROCHLORIDE 10 MG: 10 CAPSULE ORAL at 07:02

## 2025-02-22 RX ADMIN — DICYCLOMINE HYDROCHLORIDE 10 MG: 10 CAPSULE ORAL at 10:02

## 2025-02-22 RX ADMIN — SUCRALFATE 1 G: 1 TABLET ORAL at 07:02

## 2025-02-22 RX ADMIN — SUCRALFATE 1 G: 1 TABLET ORAL at 10:02

## 2025-02-22 RX ADMIN — METOCLOPRAMIDE 5 MG: 5 TABLET ORAL at 10:02

## 2025-02-22 RX ADMIN — GABAPENTIN 600 MG: 300 CAPSULE ORAL at 09:02

## 2025-02-22 RX ADMIN — Medication: at 05:02

## 2025-02-22 RX ADMIN — SENNOSIDES 8.6 MG: 8.6 TABLET, FILM COATED ORAL at 02:02

## 2025-02-22 RX ADMIN — ACETAMINOPHEN 1000 MG: 500 TABLET ORAL at 07:02

## 2025-02-22 RX ADMIN — HYDROXYUREA 1000 MG: 500 CAPSULE ORAL at 09:02

## 2025-02-22 RX ADMIN — SUCRALFATE 1 G: 1 TABLET ORAL at 04:02

## 2025-02-22 RX ADMIN — GUAIFENESIN 600 MG: 600 TABLET, EXTENDED RELEASE ORAL at 09:02

## 2025-02-22 RX ADMIN — FOLIC ACID 1 MG: 1 TABLET ORAL at 09:02

## 2025-02-22 RX ADMIN — HEPARIN 500 UNITS: 100 SYRINGE at 06:02

## 2025-02-22 RX ADMIN — FLUOXETINE HYDROCHLORIDE 40 MG: 20 CAPSULE ORAL at 09:02

## 2025-02-22 RX ADMIN — APIXABAN 5 MG: 5 TABLET, FILM COATED ORAL at 09:02

## 2025-02-22 RX ADMIN — GABAPENTIN 600 MG: 300 CAPSULE ORAL at 03:02

## 2025-02-22 RX ADMIN — AMLODIPINE BESYLATE 10 MG: 10 TABLET ORAL at 09:02

## 2025-02-22 RX ADMIN — POLYETHYLENE GLYCOL 3350 17 G: 17 POWDER, FOR SOLUTION ORAL at 09:02

## 2025-02-22 RX ADMIN — DICYCLOMINE HYDROCHLORIDE 10 MG: 10 CAPSULE ORAL at 04:02

## 2025-02-22 RX ADMIN — PANTOPRAZOLE SODIUM 40 MG: 40 TABLET, DELAYED RELEASE ORAL at 09:02

## 2025-02-22 RX ADMIN — METOCLOPRAMIDE 5 MG: 5 TABLET ORAL at 07:02

## 2025-02-22 RX ADMIN — BENZONATATE 100 MG: 100 CAPSULE ORAL at 09:02

## 2025-02-22 NOTE — PROGRESS NOTES
[facility] Medicine  Progress Note    Patient Name: Nazanin Malone  MRN: [id number]  Patient Class: IP- Inpatient   Admission Date: 2/16/2025  Length of Stay: 5 days  Attending Physician: Deb Quiroz MD  Primary Care Provider: Kezia, Primary Doctor        Subjective     Principal Problem:Sickle cell disease, type S beta-zero thalassemia with crisis        HPI:  Nazanin Malone is a 44 y.o. female with PMHx of sickle cell beta thalassemia zero, multiple pulmonary emboli on chronic AC, asthma, and HTN presenting with pain and URI. Reports acute pain to the bilateral knees and hips that feels like a sickle cell pain crisis for the last 2 days. She has been taking her gabapentin, tizanadine, and norco without relief.  Of note, she ran out of MS contin a few days ago, around time of symptom onset.  She reports there was an issue with her insurance coverage after a dosing change to the MS contin from BID to TID, though she believes this is now fixed.  She has had upper respiratory symptoms the last couple of days with nasal congestion, sore throat, headache. She has a dry cough and feels slightly SOB from all of the coughing. She had a fever last night 101F.  No chest pain, diarrhea, dysuria. She has chronic issue with abdominal pain nausea/vomiting that she says is from gastritis and she takes Bentyl, Reglan, Carafate for this which helps.  Saw her hematologist last Tuesday. She is able to ambualt despite pain.     ED: hypertensive otherwise HDS afebrile. Hbg 9.6.  Reticulocytes normal  2.2%.T bili normal.  WBC 11k. Plts 508. Lipase normal. CMP unremarkable. Flu and Covid negative. CXR with no acute cardiopulmonary process. She was given IV hydromorphone 2 mg IV x2, tylenol, benadryl, 1L LR. Admitted to  for management of pain.     Overview/Hospital Course:  Patient with initial poor rapport with physician as displeasure with pain regimen despite being highly sedated and requesting  significantly more IV pain medications.  Patient now desires to keep same physician.  Will switch pain regimen to PCA through shared decision-making.  Endorsed cough with productive sputum and now obtaining nasopharyngeal pathogen panel.    Interval History: Pt seen and examined this morning on rounds. NAEON. Feeling better, still with productive cough. Hoping to go home later today. Care plan reviewed. Otherwise, doing well and with no further complaints at this time.        Objective:     Vital Signs (Most Recent):  Temp: 98.9 °F (37.2 °C) (02/22/25 1121)  Pulse: 89 (02/22/25 1121)  Resp: 17 (02/22/25 1121)  BP: 126/77 (02/22/25 1121)  SpO2: 98 % (02/22/25 1121) Vital Signs (24h Range):  Temp:  [98 °F (36.7 °C)-99 °F (37.2 °C)] 98.9 °F (37.2 °C)  Pulse:  [80-92] 89  Resp:  [15-20] 17  SpO2:  [97 %-100 %] 98 %  BP: (104-132)/(62-89) 126/77     Weight: 103.1 kg (227 lb 4.7 oz)  Body mass index is 41.57 kg/m².  No intake or output data in the 24 hours ending 02/22/25 1410      Physical Exam  Constitutional:       Appearance: Normal appearance.   HENT:      Head: Normocephalic and atraumatic.      Nose: Nose normal.      Mouth/Throat:      Mouth: Mucous membranes are moist.   Eyes:      Extraocular Movements: Extraocular movements intact.      Pupils: Pupils are equal, round, and reactive to light.   Cardiovascular:      Rate and Rhythm: Normal rate and regular rhythm.      Heart sounds: No murmur heard.     No gallop.   Pulmonary:      Effort: Pulmonary effort is normal.      Breath sounds: Normal breath sounds. No wheezing or rales.   Abdominal:      General: Abdomen is flat. Bowel sounds are normal. There is no distension.      Palpations: Abdomen is soft.      Tenderness: There is no abdominal tenderness.   Musculoskeletal:         General: No swelling or tenderness. Normal range of motion.      Cervical back: Normal range of motion and neck supple.      Right lower leg: No edema.      Left lower leg: No edema.    Skin:     General: Skin is warm and dry.      Capillary Refill: Capillary refill takes less than 2 seconds.   Neurological:      General: No focal deficit present.      Mental Status: She is alert. Mental status is at baseline.   Psychiatric:         Mood and Affect: Mood normal.             Significant Labs: All pertinent labs within the past 24 hours have been reviewed.    Significant Imaging: I have reviewed all pertinent imaging results/findings within the past 24 hours.    Assessment and Plan     * Sickle cell disease, type S beta-zero thalassemia with crisis  - Presenting with acute bilateral hip and knee pain & URI  - Retic count normal. TB normal.  Hbg 9.6.   - CXR without consolidation.Stable on room air. Flu/covid negative.   - Given 2 mg IV dilaudid in ED without relief of pain. Also given 1 L fluids   - Continue home gabapentin, tizanidine  - Continue home hydroxyurea and folic acid  - Restarted home MS contin 30 mg (recently increased from Q12h to Q8h per pt) - she has been out for a few days which is likely exacerbating her current pain  - Changed home norco 10 mg q4h prn to scheduled tylenol 1 g q8h with oxycodone 10 mg q4h for moderate pain, 15 mg q4h for severe pain, and 0.5 mg IV hydromorphone breakthrough with initial increase to 1 mg dilaudid but less frequent  - transitioned to PCA for analgesia and patient tolerating well, plan to decrease/wean off 2/22    History of pulmonary embolism  - Continue home eliquis 5 mg BID; patient has been off of it for several weeks due to cost; efforts to ensure she can receive and afford at discharge  - given history of pulmonary embolism, lack of Eliquis for extended period, and chest pain (though reproducible on exam), low threshold for CT PE evaluation if indicated      Hypertension  Patient's blood pressure range in the last 24 hours was: BP  Min: 107/75  Max: 139/87.The patient's inpatient anti-hypertensive regimen is listed below:  Current  Antihypertensives  amLODIPine tablet 10 mg, Daily, Oral    Plan  - BP waxes and wanes: if consistently high with pain control will consider adjustment in regimen  - Will control pain as well    Chronic gastritis  Continue home PPI and carafate     Upper respiratory infection  2 days cough, congestion, HA, fever  - Flu/Covid negative  - Stable on room air   - CXR without consolidation; Procal neg  - Supportive care  - Monitor CBC   - Respiratory panel negative   -schedule mucolytic, added tessalon perles   -Repeat CXR only with atelectasis, no consolidation   -repeat procal negative, sputum ordered  -hold on abx     Abdominal pain  - Chronic, stable   - Continue home Bentyl, Carafate, Reglan     Anemia  Anemia is likely due to sickle cell-beta thalassemia . Most recent hemoglobin and hematocrit are listed below.  Recent Labs     02/20/25  0433 02/22/25  1335   HGB 7.7* 8.3*   HCT 23.8* 25.7*     Plan  - Monitor serial CBC: Daily  - Transfuse PRBC if patient becomes hemodynamically unstable, symptomatic or H/H drops below 7/21.  - Patient has not received any PRBC transfusions to date  - Patient's anemia is currently stable    Morbid obesity  Body mass index is 41.57 kg/m². Morbid obesity complicates all aspects of disease management from diagnostic modalities to treatment. Weight loss encouraged and health benefits explained to patient.         Anxiety and depression  - Continue home fluoxetine      VTE Risk Mitigation (From admission, onward)           Ordered     apixaban tablet 5 mg  2 times daily         02/17/25 0602     IP VTE HIGH RISK PATIENT  Once         02/17/25 0236     Place sequential compression device  Until discontinued         02/17/25 0236     Reason for No Pharmacological VTE Prophylaxis  Once        Question:  Reasons:  Answer:  Already adequately anticoagulated on oral Anticoagulants    02/17/25 0236                    Discharge Planning   ALYSE: 2/24/2025     Code Status: Full Code   Medical  Readiness for Discharge Date:   Discharge Plan A: Home with family   Discharge Delays: None known at this time                    Deb Quiroz MD  Department of Hospital Medicine   [facility]

## 2025-02-22 NOTE — ASSESSMENT & PLAN NOTE
- Presenting with acute bilateral hip and knee pain & URI  - Retic count normal. TB normal.  Hbg 9.6.   - CXR without consolidation.Stable on room air. Flu/covid negative.   - Given 2 mg IV dilaudid in ED without relief of pain. Also given 1 L fluids   - Continue home gabapentin, tizanidine  - Continue home hydroxyurea and folic acid  - Restarted home MS contin 30 mg (recently increased from Q12h to Q8h per pt) - she has been out for a few days which is likely exacerbating her current pain  - Changed home norco 10 mg q4h prn to scheduled tylenol 1 g q8h with oxycodone 10 mg q4h for moderate pain, 15 mg q4h for severe pain, and 0.5 mg IV hydromorphone breakthrough with initial increase to 1 mg dilaudid but less frequent  - transitioned to PCA for analgesia and patient tolerating well, plan to decrease/wean off 2/22

## 2025-02-22 NOTE — RESPIRATORY THERAPY
"RAPID RESPONSE RESPIRATORY THERAPY ETCO2 CHECK         Time of visit: 936     Code Status: Full Code   : 1980  Bed: 628/628 A:   MRN: [id number]  Time spent at the bedside: < 15 min    SITUATION    Evaluated patient for: ETCo2 compliance    BACKGROUND    Why is the patient in the hospital?: Sickle cell disease, type S beta-zero thalassemia with crisis    Patient has a past medical history of Abnormal Pap smear of cervix, 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, Asthma, Avascular necrosis of femur, Depression, History of pulmonary embolism, Hypertension, Morbid obesity, Opioid dependence, Pneumonia due to Streptococcus pneumoniae, Right lower lobe pneumonia, Sepsis due to Streptococcus pneumoniae, Sickle cell-beta thalassemia disease with pain, and Trigeminal neuralgia.    24 Hours Vitals Range:  Temp:  [98 °F (36.7 °C)-99 °F (37.2 °C)]   Pulse:  [80-92]   Resp:  [15-20]   BP: (104-132)/(62-89)   SpO2:  [97 %-100 %]     Labs:    Recent Labs     25  0433 25  0452    139   K 4.3 4.3    103   CO2 30* 29   BUN 21* 18   CREATININE 1.1 1.0   GLU 97 78        No results for input(s): "PH", "PCO2", "PO2", "HCO3", "POCSATURATED", "BE" in the last 72 hours.    ASSESSMENT/INTERVENTIONS    No respiratory concerns at this time.     Last VS   Temp: 98.5 °F (36.9 °C) ( 07)  Pulse: 90 ( 0753)  Resp: 15 ( 0753)  BP: 132/89 ( 0732)  SpO2: 97 % ( 0753)    Level of Consciousness: Level of Consciousness (AVPU): alert  Respiratory Effort: Respiratory Effort: Unlabored Expansion/Accessory Muscle Usage: Expansion/Accessory Muscles/Retractions: no retractions, no use of accessory muscles, expansion symmetric  All Lung Field Breath Sounds: All Lung Fields Breath Sounds: Anterior:, Lateral:, equal bilaterally  MILLICENT Breath Sounds: clear  LLL Breath Sounds: clear, diminished  RUL Breath Sounds: clear  RLL Breath Sounds: clear, diminished  Is the ETCO2 monitor on? Yes  Is the " patient wearing a cannula? Yes  Are ETCO2 orders placed? Yes  Is the patient on a PCA pump? Yes  ETCO2 monitored: ETCO2 (mmHg): 48 mmHg  Ambu at bedside: Yes    Active Orders   Respiratory Care    END TIDAL CO2 MONITOR Q12H     Frequency: Q12H     Number of Occurrences: Until Specified    Inhalation Treatment Q4H PRN     Frequency: Q4H PRN     Number of Occurrences: Until Specified    Oxygen Continuous     Frequency: Continuous     Number of Occurrences: Until Specified     Order Questions:      Device type: Low flow      Device: Nasal Cannula (1- 5 Liters)      LPM: 2      Titrate O2 per Oxygen Titration Protocol: Yes      To maintain SpO2 goal of: >= 94%      Notify MD of: Inability to achieve desired SpO2; Sudden change in patient status and requires 20% increase in FiO2; Patient requires >60% FiO2    Pulse Oximetry Q Shift     Frequency: Q Shift     Number of Occurrences: Until Specified       RECOMMENDATIONS    We recommend: RRT Recs: Continue POC per primary team.    FOLLOW-UP    Please call back the Rapid [facility] RRT at [phone] for any questions or concerns.

## 2025-02-22 NOTE — ASSESSMENT & PLAN NOTE
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 Most recent hemoglobin and hematocrit are listed below.  Recent Labs     02/20/25  0433 02/22/25  1335   HGB 7.7* 8.3*   HCT 23.8* 25.7*     Plan  - Monitor serial CBC: Daily  - Transfuse PRBC if patient becomes hemodynamically unstable, symptomatic or H/H drops below 7/21.  - Patient has not received any PRBC transfusions to date  - Patient's anemia is currently stable

## 2025-02-22 NOTE — SUBJECTIVE & OBJECTIVE
Interval History: Pt seen and examined this morning on thea KINGSLEY. Feeling better, still with productive cough. Hoping to go home later today. Care plan reviewed. Otherwise, doing well and with no further complaints at this time.        Objective:     Vital Signs (Most Recent):  Temp: 98.9 °F (37.2 °C) (02/22/25 1121)  Pulse: 89 (02/22/25 1121)  Resp: 17 (02/22/25 1121)  BP: 126/77 (02/22/25 1121)  SpO2: 98 % (02/22/25 1121) Vital Signs (24h Range):  Temp:  [98 °F (36.7 °C)-99 °F (37.2 °C)] 98.9 °F (37.2 °C)  Pulse:  [80-92] 89  Resp:  [15-20] 17  SpO2:  [97 %-100 %] 98 %  BP: (104-132)/(62-89) 126/77     Weight: 103.1 kg (227 lb 4.7 oz)  Body mass index is 41.57 kg/m².  No intake or output data in the 24 hours ending 02/22/25 1410      Physical Exam  Constitutional:       Appearance: Normal appearance.   HENT:      Head: Normocephalic and atraumatic.      Nose: Nose normal.      Mouth/Throat:      Mouth: Mucous membranes are moist.   Eyes:      Extraocular Movements: Extraocular movements intact.      Pupils: Pupils are equal, round, and reactive to light.   Cardiovascular:      Rate and Rhythm: Normal rate and regular rhythm.      Heart sounds: No murmur heard.     No gallop.   Pulmonary:      Effort: Pulmonary effort is normal.      Breath sounds: Normal breath sounds. No wheezing or rales.   Abdominal:      General: Abdomen is flat. Bowel sounds are normal. There is no distension.      Palpations: Abdomen is soft.      Tenderness: There is no abdominal tenderness.   Musculoskeletal:         General: No swelling or tenderness. Normal range of motion.      Cervical back: Normal range of motion and neck supple.      Right lower leg: No edema.      Left lower leg: No edema.   Skin:     General: Skin is warm and dry.      Capillary Refill: Capillary refill takes less than 2 seconds.   Neurological:      General: No focal deficit present.      Mental Status: She is alert. Mental status is at baseline.    Psychiatric:         Mood and Affect: Mood normal.             Significant Labs: All pertinent labs within the past 24 hours have been reviewed.    Significant Imaging: I have reviewed all pertinent imaging results/findings within the past 24 hours.

## 2025-02-22 NOTE — DISCHARGE INSTRUCTIONS
Our goal at [facility] is to always give you outstanding care and exceptional service. You may receive a survey by mail, text or e-mail in the next 7-10 days asking about the care you received with us. The survey should only take 5-10 minutes to complete and is very important to us.     Your feedback provides us with a way to recognize our staff who work tirelessly to provide the best care! Also, your responses help us learn how to improve when your experience was below our aspiration of excellence. We WILL use your feedback to continue making improvements to help us provide the highest quality care. We keep your personal information and feedback confidential. We appreciate your time completing this survey and can't wait to hear from you!!!     We want you to leave us today feeling like you can DEFINITELY recommend us to others! We look forward to your continued care with us! Thanks so much for choosing [facility] for your healthcare needs!

## 2025-02-23 NOTE — DISCHARGE SUMMARY
[facility] Medicine  Discharge Summary      Patient Name: Nazanin Malone  MRN: [id number]  VLADIMIR: [id number]  Patient Class: IP- Inpatient  Admission Date: 2/16/2025  Hospital Length of Stay: 5 days  Discharge Date and Time:  02/23/2025 12:46 PM  Attending Physician: Kezia att. providers found   Discharging Provider: Deb Quiroz MD  Primary Care Provider: Kezia Primary Doctor  [facility] Medicine Team: [facility] Deb Quiroz MD  Primary Care Team: [facility]    HPI:   Nazanin Malone is a 44 y.o. female with PMHx of sickle cell beta thalassemia zero, multiple pulmonary emboli on chronic AC, asthma, and HTN presenting with pain and URI. Reports acute pain to the bilateral knees and hips that feels like a sickle cell pain crisis for the last 2 days. She has been taking her gabapentin, tizanadine, and norco without relief.  Of note, she ran out of MS contin a few days ago, around time of symptom onset.  She reports there was an issue with her insurance coverage after a dosing change to the MS contin from BID to TID, though she believes this is now fixed.  She has had upper respiratory symptoms the last couple of days with nasal congestion, sore throat, headache. She has a dry cough and feels slightly SOB from all of the coughing. She had a fever last night 101F.  No chest pain, diarrhea, dysuria. She has chronic issue with abdominal pain nausea/vomiting that she says is from gastritis and she takes Bentyl, Reglan, Carafate for this which helps.  Saw her hematologist last Tuesday. She is able to ambualt despite pain.     ED: hypertensive otherwise HDS afebrile. Hbg 9.6.  Reticulocytes normal  2.2%.T bili normal.  WBC 11k. Plts 508. Lipase normal. CMP unremarkable. Flu and Covid negative. CXR with no acute cardiopulmonary process. She was given IV hydromorphone 2 mg IV x2, tylenol, benadryl, 1L LR. Admitted to  for management of pain.     * No surgery found *      Hospital Course:    Patient with initial poor rapport with physician as displeasure with pain regimen despite being highly sedated and requesting significantly more IV pain medications.  Patient now desires to keep same physician.  Will switch pain regimen to PCA through shared decision-making.  Endorsed cough with productive sputum and now obtaining nasopharyngeal pathogen panel. Repeat CXR and procal negative. Discussed abx likely not indicated. Pain continued to improve with Morphine PCA and patient denied significant itching. Patient unable to afford Eliquis and she was switched to lovenox with plans for Heme Onc follow up.    Pt deemed appropriate for discharge; seen and examined prior to departure. Plan discussed with pt, who was agreeable and amenable; medications were discussed and reviewed, outpatient follow-up scheduled, ER precautions were given, all questions were answered to the pt's satisfaction, and she was subsequently discharged.       Goals of Care Treatment Preferences:  Code Status: Full Code      SDOH Screening:  The patient was screened for utility difficulties, food insecurity, transport difficulties, housing insecurity, and interpersonal safety and there were no concerns identified this admission.     Consults:     * Sickle cell disease, type S beta-zero thalassemia with crisis  - Presenting with acute bilateral hip and knee pain & URI  - Retic count normal. TB normal.  Hbg 9.6.   - CXR without consolidation.Stable on room air. Flu/covid negative.   - Given 2 mg IV dilaudid in ED without relief of pain. Also given 1 L fluids   - Continue home gabapentin, tizanidine  - Continue home hydroxyurea and folic acid  - Restarted home MS contin 30 mg (recently increased from Q12h to Q8h per pt) - she has been out for a few days which is likely exacerbating her current pain  - Changed home norco 10 mg q4h prn to scheduled tylenol 1 g q8h with oxycodone 10 mg q4h for moderate pain, 15 mg q4h for severe pain, and 0.5 mg  IV hydromorphone breakthrough with initial increase to 1 mg dilaudid but less frequent  - transitioned to PCA for analgesia and patient tolerating well, plan to decrease/wean off 2/22    History of pulmonary embolism  - Continue home eliquis 5 mg BID; patient has been off of it for several weeks due to cost; efforts to ensure she can receive and afford at discharge  - given history of pulmonary embolism, lack of Eliquis for extended period, and chest pain (though reproducible on exam), low threshold for CT PE evaluation if indicated      Hypertension  Patient's blood pressure range in the last 24 hours was: BP  Min: 107/75  Max: 139/87.The patient's inpatient anti-hypertensive regimen is listed below:  Current Antihypertensives  amLODIPine tablet 10 mg, Daily, Oral    Plan  - BP waxes and wanes: if consistently high with pain control will consider adjustment in regimen  - Will control pain as well    Chronic gastritis  Continue home PPI and carafate     Upper respiratory infection  2 days cough, congestion, HA, fever  - Flu/Covid negative  - Stable on room air   - CXR without consolidation; Procal neg  - Supportive care  - Monitor CBC   - Respiratory panel negative   -schedule mucolytic, added tessalon perles   -Repeat CXR only with atelectasis, no consolidation   -repeat procal negative, sputum ordered  -hold on abx     Abdominal pain  - Chronic, stable   - Continue home Bentyl, Carafate, Reglan     Anemia  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 Most recent hemoglobin and hematocrit are listed below.  Recent Labs     02/20/25  0433 02/22/25  1335   HGB 7.7* 8.3*   HCT 23.8* 25.7*     Plan  - Monitor serial CBC: Daily  - Transfuse PRBC if patient becomes hemodynamically unstable, symptomatic or H/H drops below 7/21.  - Patient has not received any PRBC transfusions to date  - Patient's anemia is currently stable    Morbid obesity  Body mass index is 41.57 kg/m². Morbid obesity complicates all aspects  of disease management from diagnostic modalities to treatment. Weight loss encouraged and health benefits explained to patient.         Anxiety and depression  - Continue home fluoxetine      Final Active Diagnoses:    Diagnosis Date Noted POA    PRINCIPAL PROBLEM:  Sickle cell disease, type S beta-zero thalassemia with crisis [D57.439] 04/26/2019 Yes    History of pulmonary embolism [Z86.711] 06/08/2020 Yes     Chronic    Hypertension [I10] 04/16/2017 Yes     Chronic    Chronic gastritis [K29.50] 08/22/2024 Yes     Chronic    Upper respiratory infection [J06.9] 02/17/2025 Yes    Abdominal pain [R10.9] 10/28/2024 Yes    Anemia [D64.9] 08/22/2024 Yes    Morbid obesity [E66.01] 06/08/2020 Yes    Anxiety and depression [F41.9, F32.A] 03/20/2018 Yes     Chronic      Problems Resolved During this Admission:       Discharged Condition: good    Disposition: Home or Self Care    Follow Up:   Follow-up Information       No, Primary Doctor Follow up in 1 week(s).                           Patient Instructions:      Diet Adult Regular     Notify your health care provider if you experience any of the following:  temperature >100.4     Notify your health care provider if you experience any of the following:  redness, tenderness, or signs of infection (pain, swelling, redness, odor or green/yellow discharge around incision site)     Notify your health care provider if you experience any of the following:  persistent nausea and vomiting or diarrhea     Notify your health care provider if you experience any of the following:  severe persistent headache     Notify your health care provider if you experience any of the following:  persistent dizziness, light-headedness, or visual disturbances     Notify your health care provider if you experience any of the following:  increased confusion or weakness     Notify your health care provider if you experience any of the following:  severe uncontrolled pain     Activity as tolerated        Significant Diagnostic Studies: N/A    Pending Diagnostic Studies:       None           Medications:  Reconciled Home Medications:      Medication List        START taking these medications      enoxaparin 40 mg/0.4 mL Syrg  Commonly known as: LOVENOX  Inject 0.4 mLs (40 mg total) into the skin every 12 (twelve) hours.     guaiFENesin 600 mg 12 hr tablet  Commonly known as: MUCINEX  Take 1 tablet (600 mg total) by mouth 2 (two) times daily. for 10 days     hydroxyurea 500 mg Cap  Commonly known as: HYDREA  Take 2 capsules (1,000 mg total) by mouth once daily.            CHANGE how you take these medications      HYDROcodone-acetaminophen  mg per tablet  Commonly known as: NORCO  Take 1 tablet by mouth every 4 (four) hours as needed for Pain.  What changed: when to take this     sucralfate 1 gram tablet  Commonly known as: CARAFATE  Take 1 tablet (1 g total) by mouth 4 (four) times daily before meals and nightly.  What changed: when to take this     tiZANidine 4 MG tablet  Commonly known as: ZANAFLEX  Take 1 tablet (4 mg total) by mouth every 8 (eight) hours. for 10 days  What changed: when to take this            CONTINUE taking these medications      acetaminophen 325 MG tablet  Commonly known as: TYLENOL  Take 2 tablets (650 mg total) by mouth every 8 (eight) hours as needed for Pain.     albuterol 90 mcg/actuation inhaler  Commonly known as: VENTOLIN HFA  Inhale 2 puffs into the lungs every 6 (six) hours as needed for Wheezing or Shortness of Breath. Rescue     amLODIPine 10 MG tablet  Commonly known as: NORVASC  Take 1 tablet (10 mg total) by mouth once daily.     dicyclomine 10 MG capsule  Commonly known as: BENTYL  Take 1 capsule (10 mg total) by mouth 4 (four) times daily before meals and nightly.     FLUoxetine 40 MG capsule  Take 1 capsule (40 mg total) by mouth once daily.     fluticasone propionate 50 mcg/actuation nasal spray  Commonly known as: FLONASE  INSTILL 1 SPRAY INTO EACH NOSTRIL  EVERY DAY     folic acid 1 MG tablet  Commonly known as: FOLVITE  Take 1 tablet (1 mg total) by mouth once daily.     gabapentin 300 MG capsule  Commonly known as: NEURONTIN  Take 2 capsules (600 mg total) by mouth 3 (three) times daily.     morphine 30 MG 12 hr tablet  Commonly known as: MS CONTIN  Take 1 tablet (30 mg total) by mouth every 8 (eight) hours.     naloxone 4 mg/actuation Spry  Commonly known as: NARCAN  4mg by nasal route as needed for opioid overdose; may repeat every 2-3 minutes in alternating nostrils until medical help arrives. Call 911     pantoprazole 40 MG tablet  Commonly known as: PROTONIX  Take 1 tablet (40 mg total) by mouth 2 (two) times daily.     senna-docusate 8.6-50 mg 8.6-50 mg per tablet  Commonly known as: PERICOLACE  Take 1 tablet by mouth daily as needed for Constipation.     VITAMIN C ORAL  Take 1 capsule by mouth 2 (two) times a day.            STOP taking these medications      apixaban 5 mg Tab  Commonly known as: ELIQUIS     LIDOcaine-prilocaine cream  Commonly known as: EMLA              Indwelling Lines/Drains at time of discharge:   Lines/Drains/Airways       Central Venous Catheter Line  Duration             Port A Cath Single Lumen 02/10/25 1301 Internal Jugular Right 12 days                    Time spent on the discharge of patient: 35 minutes         Deb Quiroz MD  Department of Hospital Medicine  [facility]

## 2025-02-24 ENCOUNTER — RESULTS FOLLOW-UP (OUTPATIENT)
Dept: HEPATOLOGY | Facility: HOSPITAL | Age: 45
End: 2025-02-24

## 2025-02-24 LAB
BACTERIA SPEC AEROBE CULT: NORMAL
BACTERIA SPEC AEROBE CULT: NORMAL
GRAM STN SPEC: NORMAL

## 2025-02-24 NOTE — PLAN OF CARE
[facility] - Med Surg  Discharge Final Note    Primary Care Provider: Kezia Primary Doctor    Expected Discharge Date: 2/22/2025      Patient was discharged home using personal transportation. There were no needs from case management.     Discharge Plan A and Plan B have been determined by review of patient's clinical status, future medical and therapeutic needs, and coverage/benefits for post-acute care in coordination with multidisciplinary team members.      Final Discharge Note (most recent)       Final Note - 02/24/25 1224          Final Note    Assessment Type Final Discharge Note (P)      Anticipated Discharge Disposition Home or Self Care (P)      What phone number can be called within the next 1-3 days to see how you are doing after discharge? [phone] (P)      Hospital Resources/Appts/Education Provided Provided patient/caregiver with written discharge plan information;Provided education on problems/symptoms using teachback (P)         Post-Acute Status    Post-Acute Authorization Other (P)      Coverage [employer] MANAGED MEDICARE - [employer] MEDICARE PLAN HMO (P)      Other Status No Post-Acute Service Needs (P)      Discharge Delays None known at this time (P)                      Important Message from Medicare             Contact Info       No, Primary Doctor   Relationship: PCP - General        Next Steps: Follow up in 1 week(s)          DARROIN Nichole  Case Management  [phone]

## 2025-02-26 ENCOUNTER — HOSPITAL ENCOUNTER (INPATIENT)
Facility: OTHER | Age: 45
LOS: 2 days | Discharge: HOME | DRG: 812 | End: 2025-03-02
Attending: EMERGENCY MEDICINE | Admitting: STUDENT IN AN ORGANIZED HEALTH CARE EDUCATION/TRAINING PROGRAM
Payer: MEDICARE

## 2025-02-26 DIAGNOSIS — G89.29 CHRONIC ABDOMINAL PAIN: ICD-10-CM

## 2025-02-26 DIAGNOSIS — R52 UNCONTROLLED PAIN: ICD-10-CM

## 2025-02-26 DIAGNOSIS — D57.00 SICKLE CELL PAIN CRISIS: ICD-10-CM

## 2025-02-26 DIAGNOSIS — D57.00 SICKLE CELL PAIN CRISIS: Primary | ICD-10-CM

## 2025-02-26 DIAGNOSIS — F43.9 TRAUMA AND STRESSOR-RELATED DISORDER: ICD-10-CM

## 2025-02-26 DIAGNOSIS — R06.02 SOB (SHORTNESS OF BREATH): ICD-10-CM

## 2025-02-26 DIAGNOSIS — R25.2 LEG CRAMPING: ICD-10-CM

## 2025-02-26 DIAGNOSIS — R10.9 CHRONIC ABDOMINAL PAIN: ICD-10-CM

## 2025-02-26 DIAGNOSIS — D57.00 HB-SS DISEASE WITH VASO-OCCLUSIVE PAIN: ICD-10-CM

## 2025-02-26 DIAGNOSIS — R06.02 CHRONIC SHORTNESS OF BREATH: ICD-10-CM

## 2025-02-26 DIAGNOSIS — F33.9 EPISODE OF RECURRENT MAJOR DEPRESSIVE DISORDER, UNSPECIFIED DEPRESSION EPISODE SEVERITY: ICD-10-CM

## 2025-02-26 PROCEDURE — 99285 EMERGENCY DEPT VISIT HI MDM: CPT

## 2025-02-26 RX ORDER — MORPHINE SULFATE 30 MG/1
30 TABLET, FILM COATED, EXTENDED RELEASE ORAL EVERY 8 HOURS
Qty: 90 TABLET | Refills: 0 | Status: CANCELLED | OUTPATIENT
Start: 2025-02-26

## 2025-02-26 NOTE — TELEPHONE ENCOUNTER
----- Message from Pixia sent at 2/26/2025  4:27 PM CST -----  Regarding: Consult/Advisory  Contact: :Nazanin Malone   Consult/Advisory Name Of Caller:Nazanin Malone   Contact Preference:[phone] Nature of call:Patient is calling to see if pain medicine can be called in to . She states she's in a lot of pain.[facility] [place] [address]Lbmiy: [phone] Fax: [phone]

## 2025-02-26 NOTE — TELEPHONE ENCOUNTER
----- Message from Porsha sent at 2/26/2025 12:32 PM CST -----  Regarding: Rx Issues  Type:  Pharmacy Calling to Clarify an RXName of Caller:Nazanin WagoneraladePharmacy Name:[facility] [place] [address]Ujxok: [phone] Fax: [phone] Name:morphine (MS CONTIN) 30 MG 12 hr tabletWBest Call Back Number:[phone] Information: Patient called about rx being on back [place] [facility] and not found at other pharmacies. Patient is requesting a different rx.

## 2025-02-27 PROBLEM — F43.9 TRAUMA AND STRESSOR-RELATED DISORDER: Status: ACTIVE | Noted: 2025-02-27

## 2025-02-27 PROBLEM — F33.9 EPISODE OF RECURRENT MAJOR DEPRESSIVE DISORDER: Status: ACTIVE | Noted: 2025-02-27

## 2025-02-27 LAB
ADENOVIRUS: NOT DETECTED
ALBUMIN SERPL BCP-MCNC: 4 G/DL (ref 3.5–5.2)
ALP SERPL-CCNC: 94 U/L (ref 40–150)
ALT SERPL W/O P-5'-P-CCNC: 17 U/L (ref 10–44)
ANION GAP SERPL CALC-SCNC: 10 MMOL/L (ref 8–16)
AST SERPL-CCNC: 22 U/L (ref 10–40)
BASOPHILS # BLD AUTO: 0.05 K/UL (ref 0–0.2)
BASOPHILS NFR BLD: 0.5 % (ref 0–1.9)
BILIRUB SERPL-MCNC: 0.5 MG/DL (ref 0.1–1)
BILIRUB UR QL STRIP: NEGATIVE
BNP SERPL-MCNC: 31 PG/ML (ref 0–99)
BORDETELLA PARAPERTUSSIS (IS1001): NOT DETECTED
BORDETELLA PERTUSSIS (PTXP): NOT DETECTED
BUN SERPL-MCNC: 17 MG/DL (ref 6–20)
CALCIUM SERPL-MCNC: 9.8 MG/DL (ref 8.7–10.5)
CHLAMYDIA PNEUMONIAE: NOT DETECTED
CHLORIDE SERPL-SCNC: 109 MMOL/L (ref 95–110)
CK SERPL-CCNC: 99 U/L (ref 20–180)
CLARITY UR: CLEAR
CO2 SERPL-SCNC: 22 MMOL/L (ref 23–29)
COLOR UR: COLORLESS
CORONAVIRUS 229E, COMMON COLD VIRUS: NOT DETECTED
CORONAVIRUS HKU1, COMMON COLD VIRUS: NOT DETECTED
CORONAVIRUS NL63, COMMON COLD VIRUS: NOT DETECTED
CORONAVIRUS OC43, COMMON COLD VIRUS: NOT DETECTED
CREAT SERPL-MCNC: 1.1 MG/DL (ref 0.5–1.4)
CTP QC/QA: YES
CTP QC/QA: YES
DIFFERENTIAL METHOD BLD: ABNORMAL
EOSINOPHIL # BLD AUTO: 0.2 K/UL (ref 0–0.5)
EOSINOPHIL NFR BLD: 2.4 % (ref 0–8)
ERYTHROCYTE [DISTWIDTH] IN BLOOD BY AUTOMATED COUNT: 23.9 % (ref 11.5–14.5)
EST. GFR  (NO RACE VARIABLE): >60 ML/MIN/1.73 M^2
FLUBV RNA NPH QL NAA+NON-PROBE: NOT DETECTED
GLUCOSE SERPL-MCNC: 112 MG/DL (ref 70–110)
GLUCOSE UR QL STRIP: NEGATIVE
HCT VFR BLD AUTO: 29.6 % (ref 37–48.5)
HGB BLD-MCNC: 10 G/DL (ref 12–16)
HGB UR QL STRIP: NEGATIVE
HPIV1 RNA NPH QL NAA+NON-PROBE: NOT DETECTED
HPIV2 RNA NPH QL NAA+NON-PROBE: NOT DETECTED
HPIV3 RNA NPH QL NAA+NON-PROBE: NOT DETECTED
HPIV4 RNA NPH QL NAA+NON-PROBE: NOT DETECTED
HUMAN METAPNEUMOVIRUS: NOT DETECTED
IMM GRANULOCYTES # BLD AUTO: 0.03 K/UL (ref 0–0.04)
IMM GRANULOCYTES NFR BLD AUTO: 0.3 % (ref 0–0.5)
INFLUENZA A: NOT DETECTED
KETONES UR QL STRIP: NEGATIVE
LEUKOCYTE ESTERASE UR QL STRIP: NEGATIVE
LYMPHOCYTES # BLD AUTO: 2.4 K/UL (ref 1–4.8)
LYMPHOCYTES NFR BLD: 24.4 % (ref 18–48)
MCH RBC QN AUTO: 23.4 PG (ref 27–31)
MCHC RBC AUTO-ENTMCNC: 33.8 G/DL (ref 32–36)
MCV RBC AUTO: 69 FL (ref 82–98)
MONOCYTES # BLD AUTO: 0.8 K/UL (ref 0.3–1)
MONOCYTES NFR BLD: 8.5 % (ref 4–15)
MYCOPLASMA PNEUMONIAE: NOT DETECTED
NEUTROPHILS # BLD AUTO: 6.2 K/UL (ref 1.8–7.7)
NEUTROPHILS NFR BLD: 63.9 % (ref 38–73)
NITRITE UR QL STRIP: NEGATIVE
NRBC BLD-RTO: 1 /100 WBC
OHS QRS DURATION: 94 MS
OHS QTC CALCULATION: 464 MS
PH UR STRIP: 8 [PH] (ref 5–8)
PLATELET # BLD AUTO: 501 K/UL (ref 150–450)
PMV BLD AUTO: 8.4 FL (ref 9.2–12.9)
POC MOLECULAR INFLUENZA A AGN: NEGATIVE
POC MOLECULAR INFLUENZA B AGN: NEGATIVE
POTASSIUM SERPL-SCNC: 3.7 MMOL/L (ref 3.5–5.1)
PROT SERPL-MCNC: 8.7 G/DL (ref 6–8.4)
PROT UR QL STRIP: NEGATIVE
RBC # BLD AUTO: 4.27 M/UL (ref 4–5.4)
RESPIRATORY INFECTION PANEL SOURCE: NORMAL
RETICS/RBC NFR AUTO: 1.4 % (ref 0.5–2.5)
RSV RNA NPH QL NAA+NON-PROBE: NOT DETECTED
RV+EV RNA NPH QL NAA+NON-PROBE: NOT DETECTED
SARS-COV-2 RDRP RESP QL NAA+PROBE: NEGATIVE
SARS-COV-2 RNA RESP QL NAA+PROBE: NOT DETECTED
SODIUM SERPL-SCNC: 141 MMOL/L (ref 136–145)
SP GR UR STRIP: 1.01 (ref 1–1.03)
TROPONIN I SERPL DL<=0.01 NG/ML-MCNC: <0.006 NG/ML (ref 0–0.03)
URN SPEC COLLECT METH UR: ABNORMAL
UROBILINOGEN UR STRIP-ACNC: NEGATIVE EU/DL
WBC # BLD AUTO: 9.76 K/UL (ref 3.9–12.7)

## 2025-02-27 PROCEDURE — 25000003 PHARM REV CODE 250: Performed by: EMERGENCY MEDICINE

## 2025-02-27 PROCEDURE — 25000242 PHARM REV CODE 250 ALT 637 W/ HCPCS: Performed by: PHYSICIAN ASSISTANT

## 2025-02-27 PROCEDURE — 63600175 PHARM REV CODE 636 W HCPCS: Mod: JZ,TB | Performed by: STUDENT IN AN ORGANIZED HEALTH CARE EDUCATION/TRAINING PROGRAM

## 2025-02-27 PROCEDURE — 96372 THER/PROPH/DIAG INJ SC/IM: CPT | Performed by: EMERGENCY MEDICINE

## 2025-02-27 PROCEDURE — 87635 SARS-COV-2 COVID-19 AMP PRB: CPT | Performed by: EMERGENCY MEDICINE

## 2025-02-27 PROCEDURE — 81003 URINALYSIS AUTO W/O SCOPE: CPT | Performed by: EMERGENCY MEDICINE

## 2025-02-27 PROCEDURE — 85025 COMPLETE CBC W/AUTO DIFF WBC: CPT | Performed by: EMERGENCY MEDICINE

## 2025-02-27 PROCEDURE — 25000003 PHARM REV CODE 250: Performed by: PHYSICIAN ASSISTANT

## 2025-02-27 PROCEDURE — 80053 COMPREHEN METABOLIC PANEL: CPT | Performed by: EMERGENCY MEDICINE

## 2025-02-27 PROCEDURE — G0378 HOSPITAL OBSERVATION PER HR: HCPCS

## 2025-02-27 PROCEDURE — 82550 ASSAY OF CK (CPK): CPT | Performed by: EMERGENCY MEDICINE

## 2025-02-27 PROCEDURE — 96375 TX/PRO/DX INJ NEW DRUG ADDON: CPT

## 2025-02-27 PROCEDURE — 25000003 PHARM REV CODE 250: Performed by: STUDENT IN AN ORGANIZED HEALTH CARE EDUCATION/TRAINING PROGRAM

## 2025-02-27 PROCEDURE — 96376 TX/PRO/DX INJ SAME DRUG ADON: CPT

## 2025-02-27 PROCEDURE — 96365 THER/PROPH/DIAG IV INF INIT: CPT

## 2025-02-27 PROCEDURE — 63600175 PHARM REV CODE 636 W HCPCS: Performed by: STUDENT IN AN ORGANIZED HEALTH CARE EDUCATION/TRAINING PROGRAM

## 2025-02-27 PROCEDURE — 96361 HYDRATE IV INFUSION ADD-ON: CPT

## 2025-02-27 PROCEDURE — 63600175 PHARM REV CODE 636 W HCPCS

## 2025-02-27 PROCEDURE — 63600175 PHARM REV CODE 636 W HCPCS: Performed by: EMERGENCY MEDICINE

## 2025-02-27 PROCEDURE — 85045 AUTOMATED RETICULOCYTE COUNT: CPT | Performed by: EMERGENCY MEDICINE

## 2025-02-27 PROCEDURE — 93010 ELECTROCARDIOGRAM REPORT: CPT | Mod: ,,, | Performed by: INTERNAL MEDICINE

## 2025-02-27 PROCEDURE — 25000003 PHARM REV CODE 250

## 2025-02-27 PROCEDURE — 0202U NFCT DS 22 TRGT SARS-COV-2: CPT | Performed by: STUDENT IN AN ORGANIZED HEALTH CARE EDUCATION/TRAINING PROGRAM

## 2025-02-27 PROCEDURE — 93005 ELECTROCARDIOGRAM TRACING: CPT

## 2025-02-27 PROCEDURE — 84484 ASSAY OF TROPONIN QUANT: CPT | Performed by: EMERGENCY MEDICINE

## 2025-02-27 PROCEDURE — 63600175 PHARM REV CODE 636 W HCPCS: Performed by: PHYSICIAN ASSISTANT

## 2025-02-27 PROCEDURE — G0426 INPT/ED TELECONSULT50: HCPCS | Mod: GT,,, | Performed by: PSYCHIATRY & NEUROLOGY

## 2025-02-27 PROCEDURE — 83880 ASSAY OF NATRIURETIC PEPTIDE: CPT | Performed by: EMERGENCY MEDICINE

## 2025-02-27 RX ORDER — NALOXONE HCL 0.4 MG/ML
0.02 VIAL (ML) INJECTION
Status: DISCONTINUED | OUTPATIENT
Start: 2025-02-27 | End: 2025-02-27

## 2025-02-27 RX ORDER — PRAZOSIN HYDROCHLORIDE 1 MG/1
1 CAPSULE ORAL NIGHTLY
Status: DISCONTINUED | OUTPATIENT
Start: 2025-02-27 | End: 2025-03-02 | Stop reason: HOSPADM

## 2025-02-27 RX ORDER — HYDROCODONE BITARTRATE AND ACETAMINOPHEN 10; 325 MG/1; MG/1
1 TABLET ORAL EVERY 4 HOURS PRN
Refills: 0 | Status: DISCONTINUED | OUTPATIENT
Start: 2025-02-27 | End: 2025-03-02 | Stop reason: HOSPADM

## 2025-02-27 RX ORDER — FOLIC ACID 1 MG/1
1 TABLET ORAL DAILY
Status: DISCONTINUED | OUTPATIENT
Start: 2025-02-27 | End: 2025-03-02 | Stop reason: HOSPADM

## 2025-02-27 RX ORDER — FLUOXETINE HYDROCHLORIDE 20 MG/1
80 CAPSULE ORAL DAILY
Status: DISCONTINUED | OUTPATIENT
Start: 2025-02-28 | End: 2025-03-02 | Stop reason: HOSPADM

## 2025-02-27 RX ORDER — HYDROMORPHONE HYDROCHLORIDE 2 MG/ML
2 INJECTION, SOLUTION INTRAMUSCULAR; INTRAVENOUS; SUBCUTANEOUS
Refills: 0 | Status: COMPLETED | OUTPATIENT
Start: 2025-02-27 | End: 2025-02-27

## 2025-02-27 RX ORDER — DICYCLOMINE HYDROCHLORIDE 10 MG/1
10 CAPSULE ORAL
Status: DISCONTINUED | OUTPATIENT
Start: 2025-02-27 | End: 2025-03-02 | Stop reason: HOSPADM

## 2025-02-27 RX ORDER — METOCLOPRAMIDE 5 MG/1
5 TABLET ORAL 4 TIMES DAILY
COMMUNITY

## 2025-02-27 RX ORDER — HYDROMORPHONE HYDROCHLORIDE 1 MG/ML
1 INJECTION, SOLUTION INTRAMUSCULAR; INTRAVENOUS; SUBCUTANEOUS ONCE
Status: COMPLETED | OUTPATIENT
Start: 2025-02-27 | End: 2025-02-27

## 2025-02-27 RX ORDER — MORPHINE SULFATE 30 MG/1
30 TABLET, FILM COATED, EXTENDED RELEASE ORAL EVERY 8 HOURS
Refills: 0 | Status: DISCONTINUED | OUTPATIENT
Start: 2025-02-27 | End: 2025-03-02 | Stop reason: HOSPADM

## 2025-02-27 RX ORDER — DIPHENHYDRAMINE HCL 25 MG
25 CAPSULE ORAL
Status: COMPLETED | OUTPATIENT
Start: 2025-02-27 | End: 2025-02-27

## 2025-02-27 RX ORDER — DIPHENHYDRAMINE HYDROCHLORIDE 50 MG/ML
25 INJECTION, SOLUTION INTRAMUSCULAR; INTRAVENOUS
Status: DISCONTINUED | OUTPATIENT
Start: 2025-02-27 | End: 2025-02-28

## 2025-02-27 RX ORDER — DIPHENHYDRAMINE HYDROCHLORIDE 50 MG/ML
25 INJECTION, SOLUTION INTRAMUSCULAR; INTRAVENOUS ONCE
Status: COMPLETED | OUTPATIENT
Start: 2025-02-27 | End: 2025-02-27

## 2025-02-27 RX ORDER — AMLODIPINE BESYLATE 5 MG/1
10 TABLET ORAL DAILY
Status: DISCONTINUED | OUTPATIENT
Start: 2025-02-27 | End: 2025-03-02 | Stop reason: HOSPADM

## 2025-02-27 RX ORDER — GUAIFENESIN 100 MG/5ML
200 LIQUID ORAL EVERY 4 HOURS PRN
Status: DISCONTINUED | OUTPATIENT
Start: 2025-02-27 | End: 2025-03-02 | Stop reason: HOSPADM

## 2025-02-27 RX ORDER — FLUOXETINE 10 MG/1
40 CAPSULE ORAL DAILY
Status: DISCONTINUED | OUTPATIENT
Start: 2025-02-27 | End: 2025-02-27

## 2025-02-27 RX ORDER — ENOXAPARIN SODIUM 100 MG/ML
1 INJECTION SUBCUTANEOUS EVERY 12 HOURS
Status: DISCONTINUED | OUTPATIENT
Start: 2025-02-27 | End: 2025-03-02 | Stop reason: HOSPADM

## 2025-02-27 RX ORDER — HYDROMORPHONE HYDROCHLORIDE 2 MG/ML
3 INJECTION, SOLUTION INTRAMUSCULAR; INTRAVENOUS; SUBCUTANEOUS ONCE
Status: COMPLETED | OUTPATIENT
Start: 2025-02-27 | End: 2025-02-27

## 2025-02-27 RX ORDER — GABAPENTIN 300 MG/1
300 CAPSULE ORAL 3 TIMES DAILY
Status: DISCONTINUED | OUTPATIENT
Start: 2025-02-27 | End: 2025-02-27

## 2025-02-27 RX ORDER — ONDANSETRON HYDROCHLORIDE 2 MG/ML
8 INJECTION, SOLUTION INTRAVENOUS
Status: COMPLETED | OUTPATIENT
Start: 2025-02-27 | End: 2025-02-27

## 2025-02-27 RX ORDER — HYDROMORPHONE HYDROCHLORIDE 2 MG/ML
2 INJECTION, SOLUTION INTRAMUSCULAR; INTRAVENOUS; SUBCUTANEOUS
Status: DISCONTINUED | OUTPATIENT
Start: 2025-02-27 | End: 2025-02-28

## 2025-02-27 RX ORDER — SUCRALFATE 1 G/10ML
1 SUSPENSION ORAL EVERY 6 HOURS
Status: DISCONTINUED | OUTPATIENT
Start: 2025-02-27 | End: 2025-03-02 | Stop reason: HOSPADM

## 2025-02-27 RX ORDER — FLUTICASONE PROPIONATE 50 MCG
1 SPRAY, SUSPENSION (ML) NASAL DAILY
Status: DISCONTINUED | OUTPATIENT
Start: 2025-02-27 | End: 2025-03-02 | Stop reason: HOSPADM

## 2025-02-27 RX ORDER — AMOXICILLIN 250 MG
1 CAPSULE ORAL DAILY PRN
Status: DISCONTINUED | OUTPATIENT
Start: 2025-02-27 | End: 2025-03-02 | Stop reason: HOSPADM

## 2025-02-27 RX ORDER — DIPHENHYDRAMINE HCL 25 MG
25 CAPSULE ORAL EVERY 4 HOURS PRN
Status: DISCONTINUED | OUTPATIENT
Start: 2025-02-27 | End: 2025-02-27

## 2025-02-27 RX ORDER — SUCRALFATE 1 G/10ML
1 SUSPENSION ORAL EVERY 6 HOURS
Status: DISCONTINUED | OUTPATIENT
Start: 2025-02-27 | End: 2025-02-27

## 2025-02-27 RX ORDER — SODIUM CHLORIDE, SODIUM LACTATE, POTASSIUM CHLORIDE, CALCIUM CHLORIDE 600; 310; 30; 20 MG/100ML; MG/100ML; MG/100ML; MG/100ML
INJECTION, SOLUTION INTRAVENOUS CONTINUOUS
Status: DISCONTINUED | OUTPATIENT
Start: 2025-02-27 | End: 2025-03-02 | Stop reason: HOSPADM

## 2025-02-27 RX ORDER — HYDROXYUREA 500 MG/1
1000 CAPSULE ORAL DAILY
Status: DISCONTINUED | OUTPATIENT
Start: 2025-02-27 | End: 2025-03-02 | Stop reason: HOSPADM

## 2025-02-27 RX ORDER — MORPHINE SULFATE 4 MG/ML
4 INJECTION, SOLUTION INTRAMUSCULAR; INTRAVENOUS
Status: DISCONTINUED | OUTPATIENT
Start: 2025-02-27 | End: 2025-02-27

## 2025-02-27 RX ORDER — SUCRALFATE 1 G/1
1 TABLET ORAL
Status: DISCONTINUED | OUTPATIENT
Start: 2025-02-27 | End: 2025-02-27

## 2025-02-27 RX ORDER — TIZANIDINE 4 MG/1
4 TABLET ORAL EVERY 8 HOURS
Status: DISCONTINUED | OUTPATIENT
Start: 2025-02-27 | End: 2025-03-02 | Stop reason: HOSPADM

## 2025-02-27 RX ORDER — ACETAMINOPHEN 500 MG
1000 TABLET ORAL EVERY 8 HOURS
Status: DISCONTINUED | OUTPATIENT
Start: 2025-02-27 | End: 2025-03-02 | Stop reason: HOSPADM

## 2025-02-27 RX ORDER — METOCLOPRAMIDE 5 MG/1
5 TABLET ORAL
Status: DISCONTINUED | OUTPATIENT
Start: 2025-02-27 | End: 2025-03-02 | Stop reason: HOSPADM

## 2025-02-27 RX ORDER — HYDROMORPHONE HYDROCHLORIDE 1 MG/ML
1 INJECTION, SOLUTION INTRAMUSCULAR; INTRAVENOUS; SUBCUTANEOUS
Refills: 0 | Status: COMPLETED | OUTPATIENT
Start: 2025-02-27 | End: 2025-02-27

## 2025-02-27 RX ORDER — PANTOPRAZOLE SODIUM 40 MG/1
40 TABLET, DELAYED RELEASE ORAL 2 TIMES DAILY
Status: DISCONTINUED | OUTPATIENT
Start: 2025-02-27 | End: 2025-03-02 | Stop reason: HOSPADM

## 2025-02-27 RX ORDER — ENOXAPARIN SODIUM 100 MG/ML
40 INJECTION SUBCUTANEOUS EVERY 12 HOURS
Status: DISCONTINUED | OUTPATIENT
Start: 2025-02-27 | End: 2025-02-27

## 2025-02-27 RX ORDER — MORPHINE SULFATE 1 MG/ML
INJECTION INTRAVENOUS CONTINUOUS
Refills: 0 | Status: DISCONTINUED | OUTPATIENT
Start: 2025-02-27 | End: 2025-02-27

## 2025-02-27 RX ORDER — GABAPENTIN 300 MG/1
900 CAPSULE ORAL 3 TIMES DAILY
Status: CANCELLED | OUTPATIENT
Start: 2025-02-27

## 2025-02-27 RX ORDER — OXYCODONE HCL 20 MG/1
20 TABLET, FILM COATED, EXTENDED RELEASE ORAL 3 TIMES DAILY PRN
Qty: 20 TABLET | Refills: 0 | OUTPATIENT
Start: 2025-02-27 | End: 2026-02-27

## 2025-02-27 RX ADMIN — MORPHINE SULFATE 30 MG: 15 TABLET, FILM COATED, EXTENDED RELEASE ORAL at 06:02

## 2025-02-27 RX ADMIN — TIZANIDINE 4 MG: 4 TABLET ORAL at 06:02

## 2025-02-27 RX ADMIN — ENOXAPARIN SODIUM 100 MG: 100 INJECTION SUBCUTANEOUS at 01:02

## 2025-02-27 RX ADMIN — ENOXAPARIN SODIUM 100 MG: 100 INJECTION SUBCUTANEOUS at 08:02

## 2025-02-27 RX ADMIN — DIPHENHYDRAMINE HYDROCHLORIDE 25 MG: 50 INJECTION INTRAMUSCULAR; INTRAVENOUS at 08:02

## 2025-02-27 RX ADMIN — SODIUM CHLORIDE, POTASSIUM CHLORIDE, SODIUM LACTATE AND CALCIUM CHLORIDE 500 ML: 600; 310; 30; 20 INJECTION, SOLUTION INTRAVENOUS at 12:02

## 2025-02-27 RX ADMIN — ACETAMINOPHEN 1000 MG: 500 TABLET, FILM COATED ORAL at 02:02

## 2025-02-27 RX ADMIN — HYDROMORPHONE HYDROCHLORIDE 2 MG: 2 INJECTION INTRAMUSCULAR; INTRAVENOUS; SUBCUTANEOUS at 12:02

## 2025-02-27 RX ADMIN — FLUOXETINE 40 MG: 10 CAPSULE ORAL at 08:02

## 2025-02-27 RX ADMIN — DICYCLOMINE HYDROCHLORIDE 10 MG: 10 CAPSULE ORAL at 03:02

## 2025-02-27 RX ADMIN — PANTOPRAZOLE SODIUM 40 MG: 40 TABLET, DELAYED RELEASE ORAL at 08:02

## 2025-02-27 RX ADMIN — DICYCLOMINE HYDROCHLORIDE 10 MG: 10 CAPSULE ORAL at 06:02

## 2025-02-27 RX ADMIN — HYDROMORPHONE HYDROCHLORIDE 3 MG: 2 INJECTION INTRAMUSCULAR; INTRAVENOUS; SUBCUTANEOUS at 10:02

## 2025-02-27 RX ADMIN — ACETAMINOPHEN 1000 MG: 500 TABLET, FILM COATED ORAL at 09:02

## 2025-02-27 RX ADMIN — DICYCLOMINE HYDROCHLORIDE 10 MG: 10 CAPSULE ORAL at 08:02

## 2025-02-27 RX ADMIN — HYDROMORPHONE HYDROCHLORIDE 1 MG: 1 INJECTION, SOLUTION INTRAMUSCULAR; INTRAVENOUS; SUBCUTANEOUS at 01:02

## 2025-02-27 RX ADMIN — MORPHINE SULFATE 4 MG: 4 INJECTION, SOLUTION INTRAMUSCULAR; INTRAVENOUS at 07:02

## 2025-02-27 RX ADMIN — SUCRALFATE ORAL SUSPENSION 1 G: 1 SUSPENSION ORAL at 01:02

## 2025-02-27 RX ADMIN — SUCRALFATE ORAL SUSPENSION 1 G: 1 SUSPENSION ORAL at 11:02

## 2025-02-27 RX ADMIN — SUCRALFATE ORAL SUSPENSION 1 G: 1 SUSPENSION ORAL at 05:02

## 2025-02-27 RX ADMIN — HYDROMORPHONE HYDROCHLORIDE 2 MG: 2 INJECTION INTRAMUSCULAR; INTRAVENOUS; SUBCUTANEOUS at 05:02

## 2025-02-27 RX ADMIN — FOLIC ACID 1 MG: 1 TABLET ORAL at 08:02

## 2025-02-27 RX ADMIN — MORPHINE SULFATE: 1 INJECTION INTRAVENOUS at 08:02

## 2025-02-27 RX ADMIN — FLUTICASONE PROPIONATE 50 MCG: 50 SPRAY, METERED NASAL at 07:02

## 2025-02-27 RX ADMIN — HYDROMORPHONE HYDROCHLORIDE 2 MG: 2 INJECTION INTRAMUSCULAR; INTRAVENOUS; SUBCUTANEOUS at 08:02

## 2025-02-27 RX ADMIN — SUCRALFATE ORAL SUSPENSION 1 G: 1 SUSPENSION ORAL at 06:02

## 2025-02-27 RX ADMIN — PRAZOSIN HYDROCHLORIDE 1 MG: 1 CAPSULE ORAL at 08:02

## 2025-02-27 RX ADMIN — TIZANIDINE 4 MG: 4 TABLET ORAL at 09:02

## 2025-02-27 RX ADMIN — DICYCLOMINE HYDROCHLORIDE 10 MG: 10 CAPSULE ORAL at 10:02

## 2025-02-27 RX ADMIN — HYDROMORPHONE HYDROCHLORIDE 1 MG: 1 INJECTION, SOLUTION INTRAMUSCULAR; INTRAVENOUS; SUBCUTANEOUS at 05:02

## 2025-02-27 RX ADMIN — METOCLOPRAMIDE 5 MG: 5 TABLET ORAL at 03:02

## 2025-02-27 RX ADMIN — DIPHENHYDRAMINE HYDROCHLORIDE 25 MG: 25 CAPSULE ORAL at 12:02

## 2025-02-27 RX ADMIN — TIZANIDINE 4 MG: 4 TABLET ORAL at 02:02

## 2025-02-27 RX ADMIN — HYDROMORPHONE HYDROCHLORIDE 2 MG: 2 INJECTION INTRAMUSCULAR; INTRAVENOUS; SUBCUTANEOUS at 11:02

## 2025-02-27 RX ADMIN — DIPHENHYDRAMINE HYDROCHLORIDE 25 MG: 50 INJECTION INTRAMUSCULAR; INTRAVENOUS at 03:02

## 2025-02-27 RX ADMIN — DIPHENHYDRAMINE HYDROCHLORIDE 25 MG: 50 INJECTION INTRAMUSCULAR; INTRAVENOUS at 02:02

## 2025-02-27 RX ADMIN — SODIUM CHLORIDE, POTASSIUM CHLORIDE, SODIUM LACTATE AND CALCIUM CHLORIDE: 600; 310; 30; 20 INJECTION, SOLUTION INTRAVENOUS at 03:02

## 2025-02-27 RX ADMIN — HYDROMORPHONE HYDROCHLORIDE 2 MG: 2 INJECTION INTRAMUSCULAR; INTRAVENOUS; SUBCUTANEOUS at 02:02

## 2025-02-27 RX ADMIN — METOCLOPRAMIDE 5 MG: 5 TABLET ORAL at 11:02

## 2025-02-27 RX ADMIN — DIPHENHYDRAMINE HYDROCHLORIDE 25 MG: 50 INJECTION INTRAMUSCULAR; INTRAVENOUS at 10:02

## 2025-02-27 RX ADMIN — DIPHENHYDRAMINE HYDROCHLORIDE 25 MG: 50 INJECTION INTRAMUSCULAR; INTRAVENOUS at 05:02

## 2025-02-27 RX ADMIN — GABAPENTIN 300 MG: 300 CAPSULE ORAL at 02:02

## 2025-02-27 RX ADMIN — AMLODIPINE BESYLATE 10 MG: 5 TABLET ORAL at 08:02

## 2025-02-27 RX ADMIN — DIPHENHYDRAMINE HYDROCHLORIDE 25 MG: 50 INJECTION INTRAMUSCULAR; INTRAVENOUS at 11:02

## 2025-02-27 RX ADMIN — MORPHINE SULFATE 30 MG: 15 TABLET, FILM COATED, EXTENDED RELEASE ORAL at 10:02

## 2025-02-27 RX ADMIN — HYDROXYUREA 1000 MG: 500 CAPSULE ORAL at 08:02

## 2025-02-27 RX ADMIN — ONDANSETRON 8 MG: 2 INJECTION INTRAMUSCULAR; INTRAVENOUS at 12:02

## 2025-02-27 RX ADMIN — MORPHINE SULFATE 30 MG: 15 TABLET, FILM COATED, EXTENDED RELEASE ORAL at 02:02

## 2025-02-27 NOTE — ED PROVIDER NOTES
Encounter Date: 2/26/2025       History     Chief Complaint   Patient presents with    Sickle Cell Pain Crisis     Sickle cell pain in bilateral legs and generalized abd pain. Pt states she has had cold sx x1 week, not improving. Endorses cough, sob, nausea. Hydrocodone last taken at 1700.      HPI    This is a 44-year-old female with history of sickle-cell beta thalassemia history of pulmonary emboli on chronic anticoagulation therapy, asthma and hypertension presenting to the emergency room complaining acute on chronic pain to her lower legs bilaterally that feels like prior sickle cell pain crisis.  Patient has been taking her gabapentin tizanidine and Norco without relief.  Per chart review patient was recently discharged from [facility] where patient was admitted for sickle cell crisis.  Patient required PCA pump for pain management.  Patient was discharged with script for oxycodone, MS contin, and Lovenox as patient was not able to afford Eliquis.  Patient states she was unable to use Lovenox for the past 2 days as her prescription ran out she has a refill pending but has not filled it yet.  Patient denies any swelling to her legs she reports pain is worse is to her right leg than left she has a history of trauma to her right leg and rhabdomyolysis and reports the pain to her right leg feels similar to prior pain crisis.  Patient reports that the shortness of breath from most recent admission for pneumonia has not completely resolved but has not worsened.  She does report a fever earlier today of 100.8 but denies associated cough.  On review of systems patient also reports chronic generalized abdominal pain with nausea vomiting which she states has been a chronic issue for her is not new today.  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    Asthma     Avascular necrosis of femur     Depression     History of pulmonary embolism     Hypertension     Morbid obesity     Opioid dependence 2017    Pneumonia due to Streptococcus pneumoniae 2017    Right lower lobe pneumonia 2017    Sepsis due to Streptococcus pneumoniae 2017    Sickle cell-beta thalassemia disease with pain     Trigeminal neuralgia      Past Surgical History:   Procedure Laterality Date     SECTION      ESOPHAGOGASTRODUODENOSCOPY N/A 2024    Procedure: EGD (ESOPHAGOGASTRODUODENOSCOPY);  Surgeon: Allen Marshall MD;  Location: [address]);  Service: Gastroenterology;  Laterality: N/A;    TONSILLECTOMY      TUBAL LIGATION       Family History   Problem Relation Name Age of Onset    Heart disease Mother      Diabetes Mother      Heart disease Father      Breast cancer Neg Hx      Ovarian cancer Neg Hx      Colon cancer Neg Hx       Social History[1]  Review of Systems   Constitutional:  Positive for fever.   HENT:  Negative for sore throat.    Respiratory:  Negative for shortness of breath.    Cardiovascular:  Negative for chest pain.   Gastrointestinal:  Negative for nausea.   Musculoskeletal:  Positive for myalgias. Negative for back pain.   Skin:  Negative for rash.   Neurological:  Negative for weakness.   Hematological:  Does not bruise/bleed easily.       Physical Exam     Initial Vitals [25 2333]   BP Pulse Resp Temp SpO2   (!) 177/107 97 18 99.9 °F (37.7 °C) 97 %      MAP       --         Physical Exam    Nursing note and vitals reviewed.  Constitutional: She appears well-developed and well-nourished. She is not diaphoretic. No distress.   Obese female non toxic in NAD     HENT:   Head: Normocephalic and atraumatic. Mouth/Throat: Oropharynx is clear and moist.   Eyes: Conjunctivae  and EOM are normal. Pupils are equal, round, and reactive to light.   Neck: Neck supple. No thyromegaly present.   Normal range of motion.  Cardiovascular:  Normal rate, regular rhythm, normal heart sounds and intact distal pulses.           No murmur heard.  Pulmonary/Chest: Breath sounds normal. No respiratory distress. She has no wheezes. She has no rhonchi. She has no rales.   Abdominal: Abdomen is soft. Bowel sounds are normal. She exhibits no distension and no mass. There is no abdominal tenderness. There is no rebound.   Musculoskeletal:         General: No tenderness or edema.      Cervical back: Normal range of motion and neck supple.      Comments: Scar to right lower extremity (chronic)     Lymphadenopathy:     She has no cervical adenopathy.   Neurological: She is alert and oriented to person, place, and time.   Skin: Skin is warm and dry. No rash noted. No erythema.   Psychiatric: She has a normal mood and affect.         ED Course   Procedures  Labs Reviewed   CBC W/ AUTO DIFFERENTIAL - Abnormal       Result Value    WBC 9.76      RBC 4.27      Hemoglobin 10.0 (*)     Hematocrit 29.6 (*)     MCV 69 (*)     MCH 23.4 (*)     MCHC 33.8      RDW 23.9 (*)     Platelets 501 (*)     MPV 8.4 (*)     Immature Granulocytes 0.3      Gran # (ANC) 6.2      Immature Grans (Abs) 0.03      Lymph # 2.4      Mono # 0.8      Eos # 0.2      Baso # 0.05      nRBC 1 (*)     Gran % 63.9      Lymph % 24.4      Mono % 8.5      Eosinophil % 2.4      Basophil % 0.5      Differential Method Automated     COMPREHENSIVE METABOLIC PANEL - Abnormal    Sodium 141      Potassium 3.7      Chloride 109      CO2 22 (*)     Glucose 112 (*)     BUN 17      Creatinine 1.1      Calcium 9.8      Total Protein 8.7 (*)     Albumin 4.0      Total Bilirubin 0.5      Alkaline Phosphatase 94      AST 22      ALT 17      eGFR >60      Anion Gap 10     RETICULOCYTES    Retic 1.4     CK    CPK 99     TROPONIN I    Troponin I <0.006     B-TYPE  NATRIURETIC PEPTIDE    BNP 31     URINALYSIS, REFLEX TO URINE CULTURE   SARS-COV-2 RDRP GENE    POC Rapid COVID Negative       Acceptable Yes     POCT INFLUENZA A/B MOLECULAR    POC Molecular Influenza A Ag Negative      POC Molecular Influenza B Ag Negative       Acceptable Yes            Imaging Results              US Lower Extremity Veins Bilateral (Final result)  Result time 02/27/25 01:00:56      Final result by Beth Colvin MD (02/27/25 01:00:56)                   Impression:      No evidence of deep venous thrombosis in either lower extremity.      Electronically signed by: Beth Colvin MD  Date:    02/27/2025  Time:    01:00               Narrative:    EXAMINATION:  US LOWER EXTREMITY VEINS BILATERAL    CLINICAL HISTORY:  Cramp and spasm    TECHNIQUE:  Duplex and color flow Doppler and dynamic compression was performed of the bilateral lower extremity veins was performed.    COMPARISON:  08/24/2024    FINDINGS:  Right thigh veins: The common femoral, femoral, popliteal, upper greater saphenous, and deep femoral veins are patent and free of thrombus. The veins are normally compressible and have normal phasic flow and augmentation response.    Right calf veins: The visualized calf veins are patent.    Left thigh veins: The common femoral, femoral, popliteal, upper greater saphenous, and deep femoral veins are patent and free of thrombus. The veins are normally compressible and have normal phasic flow and augmentation response.    Left calf veins: The visualized calf veins are patent.    Miscellaneous: None                                       X-Ray Chest AP Portable (Final result)  Result time 02/27/25 01:03:08      Final result by Beth Colvin MD (02/27/25 01:03:08)                   Impression:      Please see above.      Electronically signed by: Beth Colvin MD  Date:    02/27/2025  Time:    01:03               Narrative:    EXAMINATION:  XR CHEST AP  PORTABLE    CLINICAL HISTORY:  Shortness of breath    TECHNIQUE:  Single frontal view of the chest was performed.    COMPARISON:  02/21/2025    FINDINGS:  Stably positioned right-sided chest port in place.  The cardiomediastinal silhouette is unchanged in size and configuration.  There is mild asymmetric elevation of the right hemidiaphragm, unchanged.  The lungs are symmetrically expanded without interval detrimental change in lung aeration.  No evidence of new or enlarging pneumothorax.  Osseous structures appear intact/unchanged.                                       Medications   sucralfate 100 mg/mL suspension 1 g (1 g Oral Given 2/27/25 0113)   enoxaparin injection 100 mg (has no administration in time range)   HYDROmorphone (PF) injection 2 mg (2 mg Intravenous Given 2/27/25 0033)   diphenhydrAMINE capsule 25 mg (25 mg Oral Given 2/27/25 0033)   lactated ringers bolus 500 mL (500 mLs Intravenous New Bag 2/27/25 0034)   ondansetron injection 8 mg (8 mg Intravenous Given 2/27/25 0040)   HYDROmorphone injection 1 mg (1 mg Intravenous Given 2/27/25 0113)     Medical Decision Making  Amount and/or Complexity of Data Reviewed  Labs: ordered.  Radiology: ordered.    Risk  OTC drugs.  Prescription drug management.    MDM A/P:  This is a 44-year-old female with history of sickle cell beta thalassemia, chronic abdominal pain, presenting to the emergency room complaining of bilateral lower extremity cramping.  Patient was discharged from [facility] a week ago where she was treated for upper respiratory infection sickle cell pain crisis she was discharged with oral pain medications which patient has not filled and Lovenox instead of Eliquis as patient can not afford Eliquis.  Patient has not had Lovenox in 2 days.  She denies worsening shortness of breath from baseline or worsening abdominal pain from baseline.  On exam patient is nontoxic appearing no acute distress she reports a fever at home of 100.8 however  here patient is afebrile temp of 99.9° no systemic signs or symptoms concerning for sepsis.  Patient is not acutely hypoxic satting 97% on room air no acute respiratory distress speaking in full sentences  Differential diagnosis includes but is not limited to sickle cell pain crisis, influenza, COVID-19, rhabdo, DVT.  EKG as independently reviewed by me shows normal sinus rhythm with a rate of 91 normal axis and intervals no acute ST elevations ST depressions or acute T-wave inversions to suggest acute cardiac ischemia.  I have a very low suspicion for acute PE although patient does have a history of PE patient has been taking her Lovenox up until 2 days ago would be rare to develop an acute DVT within 2 days and patient has no signs or symptoms concerning for PE such as hypoxia or worsening shortness of breath.  Troponin is less than 0.006 CPK 99 reticulocyte count 1.4, white blood cell count is not acutely elevated at 9.76 H and H is actually improved from baseline at 10/29.  Ultrasound lower extremities are negative for evidence of DVT chest x-ray as read by the radiologist radiologist  negative for evidence of a pneumothorax, acute consolidation to suggest pneumonia or acute chest syndrome pulmonary edema or other acute abnormalities.  On reassessment after IV pain medication patient reports pain is not significantly controlled and does not feel ready for discharge.  I have consulted with hospital medicine for admission.      Kristine Prado MD  [facility]  12:44 AM                                    Clinical Impression:  Final diagnoses:  [R25.2] Leg cramping  [R06.02] SOB (shortness of breath)  [D57.00] Sickle cell pain crisis (Primary)  [R06.02] Chronic shortness of breath  [R10.9, G89.29] Chronic abdominal pain  [R52] Uncontrolled pain          ED Disposition Condition    Admit Stable                  Kristine Prado MD  02/27/25 0134         [1]   Social History  Tobacco Use     Smoking status: Never    Smokeless tobacco: Never   Substance Use Topics    Alcohol use: No    Drug use: Yes     Types: Marijuana     Comment: periodically         Replansky, Kristine Head MD  02/27/25 0140

## 2025-02-27 NOTE — SUBJECTIVE & OBJECTIVE
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    Asthma     Avascular necrosis of femur     Depression     History of pulmonary embolism     Hypertension     Morbid obesity     Opioid dependence 2017    Pneumonia due to Streptococcus pneumoniae 2017    Right lower lobe pneumonia 2017    Sepsis due to Streptococcus pneumoniae 2017    Sickle cell-beta thalassemia disease with pain     Trigeminal neuralgia        Past Surgical History:   Procedure Laterality Date     SECTION      ESOPHAGOGASTRODUODENOSCOPY N/A 2024    Procedure: EGD (ESOPHAGOGASTRODUODENOSCOPY);  Surgeon: Allen Marshall MD;  Location: [address]);  Service: Gastroenterology;  Laterality: N/A;    TONSILLECTOMY      TUBAL LIGATION         Review of patient's allergies indicates:   Allergen Reactions    Cat dander Anaphylaxis, Itching and Shortness Of Breath    Nsaids (non-steroidal anti-inflammatory drug) Itching and Anaphylaxis    Latex Rash       No current facility-administered medications on file prior to encounter.     Current Outpatient Medications on File Prior to Encounter   Medication Sig    acetaminophen (TYLENOL) 325 MG tablet Take 2 tablets (650 mg total) by mouth every 8 (eight) hours as needed for Pain.    albuterol (VENTOLIN HFA) 90 mcg/actuation inhaler Inhale 2 puffs into the lungs every 6 (six) hours as needed for Wheezing or Shortness of Breath. Rescue    amLODIPine (NORVASC) 10 MG tablet Take 1 tablet (10 mg total) by mouth once daily.    ascorbic acid (VITAMIN C ORAL) Take 1 capsule by mouth 2 (two) times a day.    dicyclomine (BENTYL) 10 MG capsule Take 1 capsule (10 mg total) by mouth 4 (four) times daily before meals and nightly.    enoxaparin (LOVENOX) 40 mg/0.4 mL Syrg Inject 0.4 mLs (40 mg total) into the skin every 12 (twelve) hours.    FLUoxetine 40 MG capsule Take 1 capsule  (40 mg total) by mouth once daily.    fluticasone propionate (FLONASE) 50 mcg/actuation nasal spray INSTILL 1 SPRAY INTO EACH NOSTRIL EVERY DAY    folic acid (FOLVITE) 1 MG tablet Take 1 tablet (1 mg total) by mouth once daily.    gabapentin (NEURONTIN) 300 MG capsule Take 2 capsules (600 mg total) by mouth 3 (three) times daily. (Patient taking differently: Take 3 capsules by mouth 3 (three) times daily.)    guaiFENesin (MUCINEX) 600 mg 12 hr tablet Take 1 tablet (600 mg total) by mouth 2 (two) times daily. for 10 days    HYDROcodone-acetaminophen (NORCO)  mg per tablet Take 1 tablet by mouth every 4 (four) hours as needed for Pain. (Patient taking differently: Take 1 tablet by mouth every 4 to 6 hours as needed for Pain.)    hydroxyurea (HYDREA) 500 mg Cap Take 2 capsules (1,000 mg total) by mouth once daily.    morphine (MS CONTIN) 30 MG 12 hr tablet Take 1 tablet (30 mg total) by mouth every 8 (eight) hours. (Patient taking differently: Take 30 mg by mouth 2 (two) times daily.)    naloxone (NARCAN) 4 mg/actuation Spry 4mg by nasal route as needed for opioid overdose; may repeat every 2-3 minutes in alternating nostrils until medical help arrives. Call 911    pantoprazole (PROTONIX) 40 MG tablet Take 1 tablet (40 mg total) by mouth 2 (two) times daily. (Patient taking differently: Take 40 mg by mouth once daily.)    senna-docusate 8.6-50 mg (PERICOLACE) 8.6-50 mg per tablet Take 1 tablet by mouth daily as needed for Constipation.    sucralfate (CARAFATE) 1 gram tablet Take 1 tablet (1 g total) by mouth 4 (four) times daily before meals and nightly.    tiZANidine (ZANAFLEX) 4 MG tablet Take 1 tablet (4 mg total) by mouth every 8 (eight) hours. for 10 days     Family History       Problem Relation (Age of Onset)    Diabetes Mother    Heart disease Mother, Father          Tobacco Use    Smoking status: Never    Smokeless tobacco: Never   Substance and Sexual Activity    Alcohol use: No    Drug use: Yes      Types: Marijuana     Comment: periodically     Sexual activity: Not Currently     Birth control/protection: See Surgical Hx     Review of Systems   Constitutional:  Negative for activity change, appetite change, chills and diaphoresis.   Respiratory:  Negative for cough, shortness of breath and wheezing.    Cardiovascular:  Negative for chest pain, palpitations and leg swelling.   Gastrointestinal:  Negative for abdominal distention, abdominal pain, constipation, diarrhea, nausea and vomiting.   Genitourinary:  Negative for dysuria, flank pain, frequency, hematuria and urgency.   Musculoskeletal:  Positive for arthralgias and myalgias. Negative for back pain, gait problem, neck pain and neck stiffness.   Skin:  Negative for color change and pallor.   Neurological:  Negative for dizziness, syncope, weakness, light-headedness, numbness and headaches.   Psychiatric/Behavioral:  Negative for agitation and confusion.      Objective:     Vital Signs (Most Recent):  Temp: 99.9 °F (37.7 °C) (02/26/25 2333)  Pulse: 76 (02/27/25 0602)  Resp: 16 (02/27/25 0609)  BP: 132/86 (02/27/25 0602)  SpO2: 97 % (02/27/25 0602) Vital Signs (24h Range):  Temp:  [99.9 °F (37.7 °C)] 99.9 °F (37.7 °C)  Pulse:  [76-99] 76  Resp:  [14-20] 16  SpO2:  [95 %-97 %] 97 %  BP: (124-177)/() 132/86     Weight: 99.8 kg (220 lb)  Body mass index is 40.24 kg/m².     Physical Exam  Vitals and nursing note reviewed.   Constitutional:       General: She is not in acute distress.     Appearance: She is well-developed. She is obese. She is not ill-appearing, toxic-appearing or diaphoretic.   HENT:      Head: Normocephalic and atraumatic.   Eyes:      General: No scleral icterus.        Right eye: No discharge.         Left eye: No discharge.      Conjunctiva/sclera: Conjunctivae normal.   Neck:      Trachea: No tracheal deviation.   Cardiovascular:      Rate and Rhythm: Normal rate and regular rhythm.      Heart sounds: Normal heart sounds. No murmur  "heard.     No gallop.   Pulmonary:      Effort: Pulmonary effort is normal. No respiratory distress.      Breath sounds: Normal breath sounds. No stridor. No wheezing or rales.   Abdominal:      General: Bowel sounds are normal. There is no distension.      Palpations: Abdomen is soft. There is no mass.      Tenderness: There is no abdominal tenderness. There is no guarding.   Musculoskeletal:         General: No deformity. Normal range of motion.      Cervical back: Normal range of motion and neck supple.   Skin:     General: Skin is warm and dry.      Coloration: Skin is not pale.      Findings: No erythema or rash.   Neurological:      General: No focal deficit present.      Mental Status: She is alert and oriented to person, place, and time.      Cranial Nerves: No cranial nerve deficit.      Motor: No abnormal muscle tone.   Psychiatric:         Mood and Affect: Mood normal.         Behavior: Behavior normal.         Thought Content: Thought content normal.         Judgment: Judgment normal.                Significant Labs: All pertinent labs within the past 24 hours have been reviewed.  BMP:   Recent Labs   Lab 02/27/25  0029   *      K 3.7      CO2 22*   BUN 17   CREATININE 1.1   CALCIUM 9.8     CBC:   Recent Labs   Lab 02/27/25  0029   WBC 9.76   HGB 10.0*   HCT 29.6*   *     CMP:   Recent Labs   Lab 02/27/25  0029      K 3.7      CO2 22*   *   BUN 17   CREATININE 1.1   CALCIUM 9.8   PROT 8.7*   ALBUMIN 4.0   BILITOT 0.5   ALKPHOS 94   AST 22   ALT 17   ANIONGAP 10     Urine Culture: No results for input(s): "LABURIN" in the last 48 hours.  Urine Studies:   Recent Labs   Lab 02/27/25  0145   COLORU Colorless*   APPEARANCEUA Clear   PHUR 8.0   SPECGRAV 1.010   PROTEINUA Negative   GLUCUA Negative   KETONESU Negative   BILIRUBINUA Negative   OCCULTUA Negative   NITRITE Negative   UROBILINOGEN Negative   LEUKOCYTESUR Negative       Significant Imaging: I have " reviewed all pertinent imaging results/findings within the past 24 hours.  Imaging Results              US Lower Extremity Veins Bilateral (Final result)  Result time 02/27/25 01:00:56      Final result by Bteh Colvin MD (02/27/25 01:00:56)                   Impression:      No evidence of deep venous thrombosis in either lower extremity.      Electronically signed by: Beth Colvin MD  Date:    02/27/2025  Time:    01:00               Narrative:    EXAMINATION:  US LOWER EXTREMITY VEINS BILATERAL    CLINICAL HISTORY:  Cramp and spasm    TECHNIQUE:  Duplex and color flow Doppler and dynamic compression was performed of the bilateral lower extremity veins was performed.    COMPARISON:  08/24/2024    FINDINGS:  Right thigh veins: The common femoral, femoral, popliteal, upper greater saphenous, and deep femoral veins are patent and free of thrombus. The veins are normally compressible and have normal phasic flow and augmentation response.    Right calf veins: The visualized calf veins are patent.    Left thigh veins: The common femoral, femoral, popliteal, upper greater saphenous, and deep femoral veins are patent and free of thrombus. The veins are normally compressible and have normal phasic flow and augmentation response.    Left calf veins: The visualized calf veins are patent.    Miscellaneous: None                                       X-Ray Chest AP Portable (Final result)  Result time 02/27/25 01:03:08      Final result by Beth Colvin MD (02/27/25 01:03:08)                   Impression:      Please see above.      Electronically signed by: Beth Colvin MD  Date:    02/27/2025  Time:    01:03               Narrative:    EXAMINATION:  XR CHEST AP PORTABLE    CLINICAL HISTORY:  Shortness of breath    TECHNIQUE:  Single frontal view of the chest was performed.    COMPARISON:  02/21/2025    FINDINGS:  Stably positioned right-sided chest port in place.  The cardiomediastinal silhouette is unchanged in size  and configuration.  There is mild asymmetric elevation of the right hemidiaphragm, unchanged.  The lungs are symmetrically expanded without interval detrimental change in lung aeration.  No evidence of new or enlarging pneumothorax.  Osseous structures appear intact/unchanged.

## 2025-02-27 NOTE — ASSESSMENT & PLAN NOTE
- Symptoms improving from last admission, not fully resolved   - continue supportive care   - RIP

## 2025-02-27 NOTE — PLAN OF CARE
02/27/25 1331   Readmission   Was this a planned readmission? No   Why were you hospitalized in the last 30 days? sickle cell   Why were you readmitted? Related to previous admission   When you left the hospital how did you feel? did not feel better   When you left the hospital where did you go? Home with Family   Did patient/caregiver refused recommended DC plan? No

## 2025-02-27 NOTE — ASSESSMENT & PLAN NOTE
- Ms. Nazanin Malone presents due to continued / worsening sickle cell crisis pain that has worsened since d/c from [facility]. Also reported that unable to obtain MS contin from multiple pharmacies due to shortage.   - retic count not elevated   - H&H are concentrated compared to baseline and to last labs   - no symptoms c/w acute chest syndrome   - s/p treatment w/ pain meds in ED   - continue IV fluids for hydration   - dilaudid 2mg q3 PRN, Norco PRN, MS contin TID
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
## 2025-02-27 NOTE — H&P
[facility], [facility] Medicine  History & Physical    Patient Name: Nazanin Malone  MRN: [id number]  Patient Class: OP- Observation  Admission Date: 2/26/2025  Attending Physician: Sherine Meier MD   Primary Care Provider: Kezia Primary Doctor         Patient information was obtained from patient, past medical records, and ER records.     Subjective:     Principal Problem:Sickle cell disease, type S beta-zero thalassemia with crisis    Chief Complaint:   Chief Complaint   Patient presents with    Sickle Cell Pain Crisis     Sickle cell pain in bilateral legs and generalized abd pain. Pt states she has had cold sx x1 week, not improving. Endorses cough, sob, nausea. Hydrocodone last taken at 1700.         HPI: Ms. Nazanin Malone is a 44 y.o. female, with PMH of sickle cell anemia, multiple prior PEs on chronic anticoagulation (intermittently compliant with Eliquis due to cost so changed to Lovenox during recent admission), asthma, HTN, who presented to [facility] ED on 2/26/25 due to worsening chronic pain of her b/l lower extremities (R>L) which she states is c/w her sickle cell pain as during a pain crisis. She further reports that she has had cold symptoms x 1 week (nasal congestion, sore throat, headache, dry cough, fever), and notes chronic generalized abdominal pain with nausea and cough with shortness of breath (chronic, not worsened from baseline), nausea. She has been taking her Norco, gabapentin, and tizanidine without pain relief. She stats she has been having shortness of breath since she was diagnosed with pneumonia. Earlier today she reports a temperature of 100.8F. She denies cough.     She was recently admitted to [facility] for sickle cell pain crisis from 2/17 - 2/22, and also from 2/2 -2/6. During her most recent admission she required a PCA pump for pain management. She reports she was discharged with Rx for oxycodone, MS contin, and Lovenox. She states she did not fill her  Lovenox as she was unable to afford it, but also states she has only been off of her Lovenox x 2 days.     In the ED today, labs show H&H of 10.0/39.6 without leukocytosis or left shift. A metabolic panel shows no acute findings. Reticulocyte count is not elevated. A XXR showed right sided chest port in stable position without further acute cardiopulmonary findings. She had an US of the b/l LE veins that was negative for DVT. She denied shortness of breath worsened from baseline, chest pain, or any other symptom concerning for PE. She was treated in the ED with pain meds. She was placed on observation.     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    Asthma     Avascular necrosis of femur     Depression     History of pulmonary embolism     Hypertension     Morbid obesity     Opioid dependence 2017    Pneumonia due to Streptococcus pneumoniae 2017    Right lower lobe pneumonia 2017    Sepsis due to Streptococcus pneumoniae 2017    Sickle cell-beta thalassemia disease with pain     Trigeminal neuralgia        Past Surgical History:   Procedure Laterality Date     SECTION      ESOPHAGOGASTRODUODENOSCOPY N/A 2024    Procedure: EGD (ESOPHAGOGASTRODUODENOSCOPY);  Surgeon: Allen Marshall MD;  Location: [facility] ([address] [place] [address]);  Service: Gastroenterology;  Laterality: N/A;    TONSILLECTOMY      TUBAL LIGATION         Review of patient's allergies indicates:   Allergen Reactions    Cat dander Anaphylaxis, Itching and Shortness Of Breath    Nsaids (non-steroidal anti-inflammatory drug) Itching and Anaphylaxis    Latex Rash       No current facility-administered medications on file prior to encounter.     Current Outpatient Medications on File Prior to Encounter   Medication Sig    acetaminophen (TYLENOL) 325 MG tablet Take 2 tablets (650 mg total) by mouth every 8 (eight) hours as  needed for Pain.    albuterol (VENTOLIN HFA) 90 mcg/actuation inhaler Inhale 2 puffs into the lungs every 6 (six) hours as needed for Wheezing or Shortness of Breath. Rescue    amLODIPine (NORVASC) 10 MG tablet Take 1 tablet (10 mg total) by mouth once daily.    ascorbic acid (VITAMIN C ORAL) Take 1 capsule by mouth 2 (two) times a day.    dicyclomine (BENTYL) 10 MG capsule Take 1 capsule (10 mg total) by mouth 4 (four) times daily before meals and nightly.    enoxaparin (LOVENOX) 40 mg/0.4 mL Syrg Inject 0.4 mLs (40 mg total) into the skin every 12 (twelve) hours.    FLUoxetine 40 MG capsule Take 1 capsule (40 mg total) by mouth once daily.    fluticasone propionate (FLONASE) 50 mcg/actuation nasal spray INSTILL 1 SPRAY INTO EACH NOSTRIL EVERY DAY    folic acid (FOLVITE) 1 MG tablet Take 1 tablet (1 mg total) by mouth once daily.    gabapentin (NEURONTIN) 300 MG capsule Take 2 capsules (600 mg total) by mouth 3 (three) times daily. (Patient taking differently: Take 3 capsules by mouth 3 (three) times daily.)    guaiFENesin (MUCINEX) 600 mg 12 hr tablet Take 1 tablet (600 mg total) by mouth 2 (two) times daily. for 10 days    HYDROcodone-acetaminophen (NORCO)  mg per tablet Take 1 tablet by mouth every 4 (four) hours as needed for Pain. (Patient taking differently: Take 1 tablet by mouth every 4 to 6 hours as needed for Pain.)    hydroxyurea (HYDREA) 500 mg Cap Take 2 capsules (1,000 mg total) by mouth once daily.    morphine (MS CONTIN) 30 MG 12 hr tablet Take 1 tablet (30 mg total) by mouth every 8 (eight) hours. (Patient taking differently: Take 30 mg by mouth 2 (two) times daily.)    naloxone (NARCAN) 4 mg/actuation Spry 4mg by nasal route as needed for opioid overdose; may repeat every 2-3 minutes in alternating nostrils until medical help arrives. Call 911    pantoprazole (PROTONIX) 40 MG tablet Take 1 tablet (40 mg total) by mouth 2 (two) times daily. (Patient taking differently: Take 40 mg by mouth  once daily.)    senna-docusate 8.6-50 mg (PERICOLACE) 8.6-50 mg per tablet Take 1 tablet by mouth daily as needed for Constipation.    sucralfate (CARAFATE) 1 gram tablet Take 1 tablet (1 g total) by mouth 4 (four) times daily before meals and nightly.    tiZANidine (ZANAFLEX) 4 MG tablet Take 1 tablet (4 mg total) by mouth every 8 (eight) hours. for 10 days     Family History       Problem Relation (Age of Onset)    Diabetes Mother    Heart disease Mother, Father          Tobacco Use    Smoking status: Never    Smokeless tobacco: Never   Substance and Sexual Activity    Alcohol use: No    Drug use: Yes     Types: Marijuana     Comment: periodically     Sexual activity: Not Currently     Birth control/protection: See Surgical Hx     Review of Systems   Constitutional:  Negative for activity change, appetite change, chills and diaphoresis.   Respiratory:  Negative for cough, shortness of breath and wheezing.    Cardiovascular:  Negative for chest pain, palpitations and leg swelling.   Gastrointestinal:  Negative for abdominal distention, abdominal pain, constipation, diarrhea, nausea and vomiting.   Genitourinary:  Negative for dysuria, flank pain, frequency, hematuria and urgency.   Musculoskeletal:  Positive for arthralgias and myalgias. Negative for back pain, gait problem, neck pain and neck stiffness.   Skin:  Negative for color change and pallor.   Neurological:  Negative for dizziness, syncope, weakness, light-headedness, numbness and headaches.   Psychiatric/Behavioral:  Negative for agitation and confusion.      Objective:     Vital Signs (Most Recent):  Temp: 99.9 °F (37.7 °C) (02/26/25 2333)  Pulse: 76 (02/27/25 0602)  Resp: 16 (02/27/25 0609)  BP: 132/86 (02/27/25 0602)  SpO2: 97 % (02/27/25 0602) Vital Signs (24h Range):  Temp:  [99.9 °F (37.7 °C)] 99.9 °F (37.7 °C)  Pulse:  [76-99] 76  Resp:  [14-20] 16  SpO2:  [95 %-97 %] 97 %  BP: (124-177)/() 132/86     Weight: 99.8 kg (220 lb)  Body mass index  is 40.24 kg/m².     Physical Exam  Vitals and nursing note reviewed.   Constitutional:       General: She is not in acute distress.     Appearance: She is well-developed. She is obese. She is not ill-appearing, toxic-appearing or diaphoretic.   HENT:      Head: Normocephalic and atraumatic.   Eyes:      General: No scleral icterus.        Right eye: No discharge.         Left eye: No discharge.      Conjunctiva/sclera: Conjunctivae normal.   Neck:      Trachea: No tracheal deviation.   Cardiovascular:      Rate and Rhythm: Normal rate and regular rhythm.      Heart sounds: Normal heart sounds. No murmur heard.     No gallop.   Pulmonary:      Effort: Pulmonary effort is normal. No respiratory distress.      Breath sounds: Normal breath sounds. No stridor. No wheezing or rales.   Abdominal:      General: Bowel sounds are normal. There is no distension.      Palpations: Abdomen is soft. There is no mass.      Tenderness: There is no abdominal tenderness. There is no guarding.   Musculoskeletal:         General: No deformity. Normal range of motion.      Cervical back: Normal range of motion and neck supple.   Skin:     General: Skin is warm and dry.      Coloration: Skin is not pale.      Findings: No erythema or rash.   Neurological:      General: No focal deficit present.      Mental Status: She is alert and oriented to person, place, and time.      Cranial Nerves: No cranial nerve deficit.      Motor: No abnormal muscle tone.   Psychiatric:         Mood and Affect: Mood normal.         Behavior: Behavior normal.         Thought Content: Thought content normal.         Judgment: Judgment normal.                Significant Labs: All pertinent labs within the past 24 hours have been reviewed.  BMP:   Recent Labs   Lab 02/27/25  0029   *      K 3.7      CO2 22*   BUN 17   CREATININE 1.1   CALCIUM 9.8     CBC:   Recent Labs   Lab 02/27/25 0029   WBC 9.76   HGB 10.0*   HCT 29.6*   *     CMP:  "  Recent Labs   Lab 02/27/25  0029      K 3.7      CO2 22*   *   BUN 17   CREATININE 1.1   CALCIUM 9.8   PROT 8.7*   ALBUMIN 4.0   BILITOT 0.5   ALKPHOS 94   AST 22   ALT 17   ANIONGAP 10     Urine Culture: No results for input(s): "LABURIN" in the last 48 hours.  Urine Studies:   Recent Labs   Lab 02/27/25  0145   COLORU Colorless*   APPEARANCEUA Clear   PHUR 8.0   SPECGRAV 1.010   PROTEINUA Negative   GLUCUA Negative   KETONESU Negative   BILIRUBINUA Negative   OCCULTUA Negative   NITRITE Negative   UROBILINOGEN Negative   LEUKOCYTESUR Negative       Significant Imaging: I have reviewed all pertinent imaging results/findings within the past 24 hours.  Imaging Results              US Lower Extremity Veins Bilateral (Final result)  Result time 02/27/25 01:00:56      Final result by Beth Colvin MD (02/27/25 01:00:56)                   Impression:      No evidence of deep venous thrombosis in either lower extremity.      Electronically signed by: Beth Colvin MD  Date:    02/27/2025  Time:    01:00               Narrative:    EXAMINATION:  US LOWER EXTREMITY VEINS BILATERAL    CLINICAL HISTORY:  Cramp and spasm    TECHNIQUE:  Duplex and color flow Doppler and dynamic compression was performed of the bilateral lower extremity veins was performed.    COMPARISON:  08/24/2024    FINDINGS:  Right thigh veins: The common femoral, femoral, popliteal, upper greater saphenous, and deep femoral veins are patent and free of thrombus. The veins are normally compressible and have normal phasic flow and augmentation response.    Right calf veins: The visualized calf veins are patent.    Left thigh veins: The common femoral, femoral, popliteal, upper greater saphenous, and deep femoral veins are patent and free of thrombus. The veins are normally compressible and have normal phasic flow and augmentation response.    Left calf veins: The visualized calf veins are patent.    Miscellaneous: None                  "                      X-Ray Chest AP Portable (Final result)  Result time 02/27/25 01:03:08      Final result by Beth Colvin MD (02/27/25 01:03:08)                   Impression:      Please see above.      Electronically signed by: Beth Colvin MD  Date:    02/27/2025  Time:    01:03               Narrative:    EXAMINATION:  XR CHEST AP PORTABLE    CLINICAL HISTORY:  Shortness of breath    TECHNIQUE:  Single frontal view of the chest was performed.    COMPARISON:  02/21/2025    FINDINGS:  Stably positioned right-sided chest port in place.  The cardiomediastinal silhouette is unchanged in size and configuration.  There is mild asymmetric elevation of the right hemidiaphragm, unchanged.  The lungs are symmetrically expanded without interval detrimental change in lung aeration.  No evidence of new or enlarging pneumothorax.  Osseous structures appear intact/unchanged.                                       Assessment/Plan:     * Sickle cell disease, type S beta-zero thalassemia with crisis  - Ms. Nazanin Malone presents due to continued / worsening sickle cell crisis pain that has worsened since d/c from [facility]   - retic count not elevated   - H&H are concentrated compared to baseline and to last labs   - no symptoms c/w acute chest syndrome   - s/p treatment w/ pain meds in ED   - continue IV fluids for hydration   - will initiate PCA pump for treatment of sickle cell pain     Upper respiratory infection  - Symptoms improving from last admission, not fully resolved   - continue supportive care       Chronic gastritis  - continue carafate, famotidine, and bentyl   - states she also takes reglan      History of pulmonary embolism  - recently taken off of Eliquis and started on Lovenox as Eliquis was too expensive   - now she reports the Lovenox is too expensive and she ran out two days ago and had not taken it   - s/p full weight based dose of lovenox given in ED   - declines CT PE study at time of  admission  - continue on lovenox while admitted       Intermittent asthma  - no apparent exacerbation  - PRN O2   - monitor       Hypertension  Patient's blood pressure range in the last 24 hours was: BP  Min: 154/94  Max: 177/107.The patient's inpatient anti-hypertensive regimen is listed below:  Current Antihypertensives  amLODIPine tablet 10 mg, Daily, Oral    Plan  - BP is controlled, no changes needed to their regimen    Iron deficiency anemia  - hemo-concentrated today compared to baseline   - Anemia is likely due to chronic blood loss. Most recent hemoglobin and hematocrit are listed below.  Recent Labs     02/27/25  0029   HGB 10.0*   HCT 29.6*     Plan  - Monitor serial CBC: Daily  - Transfuse PRBC if patient becomes hemodynamically unstable, symptomatic or H/H drops below 7/21.  - Patient has not received any PRBC transfusions to date  - Patient's anemia is currently stable      VTE Risk Mitigation (From admission, onward)           Ordered     enoxaparin injection 100 mg  Every 12 hours         02/27/25 0129                         On 02/27/2025, patient should be placed in hospital observation services under the care of Dr. Sherine Meier MD.           CESAR SaenzC  Department of Hospital Medicine  [facility] Emergency Dept

## 2025-02-27 NOTE — ASSESSMENT & PLAN NOTE
Patient's blood pressure range in the last 24 hours was: BP  Min: 154/94  Max: 177/107.The patient's inpatient anti-hypertensive regimen is listed below:  Current Antihypertensives  amLODIPine tablet 10 mg, Daily, Oral    Plan  - BP is controlled, no changes needed to their regimen

## 2025-02-27 NOTE — SUBJECTIVE & OBJECTIVE
Interval History: Reports right leg pain and left hip pain are very severe.     Review of Systems   HENT:  Positive for congestion.    Respiratory:  Positive for cough. Negative for shortness of breath and wheezing.    Cardiovascular:  Negative for chest pain and leg swelling.   Gastrointestinal:  Negative for abdominal distention, abdominal pain, constipation, diarrhea, nausea and vomiting.   Musculoskeletal:  Positive for arthralgias and myalgias. Negative for back pain, gait problem, neck pain and neck stiffness.   Neurological:  Negative for dizziness, syncope, weakness, light-headedness, numbness and headaches.   Psychiatric/Behavioral:  Negative for agitation and confusion.      Objective:     Vital Signs (Most Recent):  Temp: 98.4 °F (36.9 °C) (02/27/25 0729)  Pulse: 75 (02/27/25 1102)  Resp: 13 (02/27/25 1102)  BP: (!) 144/92 (02/27/25 1102)  SpO2: 95 % (02/27/25 1102) Vital Signs (24h Range):  Temp:  [98.4 °F (36.9 °C)-99.9 °F (37.7 °C)] 98.4 °F (36.9 °C)  Pulse:  [69-99] 75  Resp:  [12-20] 13  SpO2:  [95 %-97 %] 95 %  BP: (105-177)/() 144/92     Weight: 99.8 kg (220 lb)  Body mass index is 40.24 kg/m².    Intake/Output Summary (Last 24 hours) at 2/27/2025 1341  Last data filed at 2/27/2025 0152  Gross per 24 hour   Intake 500 ml   Output --   Net 500 ml         Physical Exam  Constitutional:       General: She is not in acute distress.  Pulmonary:      Effort: No respiratory distress.      Breath sounds: No wheezing.   Abdominal:      General: There is no distension.   Musculoskeletal:      Right lower leg: No edema.      Left lower leg: No edema.   Skin:     Comments: Right leg old scar    Neurological:      General: No focal deficit present.      Mental Status: She is oriented to person, place, and time.             Significant Labs: All pertinent labs within the past 24 hours have been reviewed.    Significant Imaging: I have reviewed all pertinent imaging results/findings within the past 24  hours.

## 2025-02-27 NOTE — PROGRESS NOTES
[facility] Emergency Dept  [facility] Medicine  Progress Note    Patient Name: Nazanin Malone  MRN: [id number]  Patient Class: OP- Observation   Admission Date: 2/26/2025  Length of Stay: 0 days  Attending Physician: Sherine Meier MD  Primary Care Provider: Kezia, Primary Doctor            Principal Problem:Sickle cell disease, type S beta-zero thalassemia with crisis        HPI:  Ms. Nazanin Malone is a 44 y.o. female, with PMH of sickle cell anemia, multiple prior PEs on chronic anticoagulation (intermittently compliant with Eliquis due to cost so changed to Lovenox during recent admission), asthma, HTN, who presented to [facility] on 2/26/25 due to worsening chronic pain of her b/l lower extremities (R>L) which she states is c/w her sickle cell pain as during a pain crisis. She further reports that she has had cold symptoms x 1 week (nasal congestion, sore throat, headache, dry cough, fever), and notes chronic generalized abdominal pain with nausea and cough with shortness of breath (chronic, not worsened from baseline), nausea. She has been taking her Norco, gabapentin, and tizanidine without pain relief. She states she has been having shortness of breath since she was diagnosed with pneumonia. Earlier today she reports a temperature of 100.8F at home. She denies cough.     She was recently admitted to [facility] for sickle cell pain crisis from 2/17 - 2/22, and also from 2/2 -2/6. During her most recent admission she required a PCA pump for pain management. She reports she was discharged with Rx for oxycodone, MS contin, and Lovenox. She states she did not fill her Lovenox as she was unable to afford it, but also states she has only been off of her Lovenox x 2 days. Reports when brother gets pay check he will be able to pay for it. Also reported that unable to obtain MS contin from multiple pharmacies due to shortage.     In the ED today, labs show H&H of 10.0/39.6 without leukocytosis or left shift. A  metabolic panel shows no acute findings. Reticulocyte count is not elevated. A CXR showed right sided chest port in stable position without further acute cardiopulmonary findings. She had an US of the b/l LE veins that was negative for DVT. She denied shortness of breath worsened from baseline, chest pain, or any other symptom concerning for PE. She was treated in the ED with pain meds. She was placed on observation.     Overview/Hospital Course:  Reports right leg pain and left hip pain are very severe. Treat sickle cell crisis. Wean as able. Continues to have symptoms of URTI but feels it is improving. Seen by psych for depression and life stressors. Denies SI. Rec increase fluoxetine to 80 mg daily for mood and start prazosin 1 mg qhs for trauma nightmares      Interval History: Reports right leg pain and left hip pain are very severe.     Review of Systems   HENT:  Positive for congestion.    Respiratory:  Positive for cough. Negative for shortness of breath and wheezing.    Cardiovascular:  Negative for chest pain and leg swelling.   Gastrointestinal:  Negative for abdominal distention, abdominal pain, constipation, diarrhea, nausea and vomiting.   Musculoskeletal:  Positive for arthralgias and myalgias. Negative for back pain, gait problem, neck pain and neck stiffness.   Neurological:  Negative for dizziness, syncope, weakness, light-headedness, numbness and headaches.   Psychiatric/Behavioral:  Negative for agitation and confusion.      Objective:     Vital Signs (Most Recent):  Temp: 98.4 °F (36.9 °C) (02/27/25 0729)  Pulse: 75 (02/27/25 1102)  Resp: 13 (02/27/25 1102)  BP: (!) 144/92 (02/27/25 1102)  SpO2: 95 % (02/27/25 1102) Vital Signs (24h Range):  Temp:  [98.4 °F (36.9 °C)-99.9 °F (37.7 °C)] 98.4 °F (36.9 °C)  Pulse:  [69-99] 75  Resp:  [12-20] 13  SpO2:  [95 %-97 %] 95 %  BP: (105-177)/() 144/92     Weight: 99.8 kg (220 lb)  Body mass index is 40.24 kg/m².    Intake/Output Summary (Last 24  hours) at 2/27/2025 1341  Last data filed at 2/27/2025 0152  Gross per 24 hour   Intake 500 ml   Output --   Net 500 ml         Physical Exam  Constitutional:       General: She is not in acute distress.  Pulmonary:      Effort: No respiratory distress.      Breath sounds: No wheezing.   Abdominal:      General: There is no distension.   Musculoskeletal:      Right lower leg: No edema.      Left lower leg: No edema.   Skin:     Comments: Right leg old scar    Neurological:      General: No focal deficit present.      Mental Status: She is oriented to person, place, and time.             Significant Labs: All pertinent labs within the past 24 hours have been reviewed.    Significant Imaging: I have reviewed all pertinent imaging results/findings within the past 24 hours.    Assessment and Plan     * Sickle cell disease, type S beta-zero thalassemia with crisis  - Ms. Nazanin Malone presents due to continued / worsening sickle cell crisis pain that has worsened since d/c from [facility]. Also reported that unable to obtain MS contin from multiple pharmacies due to shortage.   - retic count not elevated   - H&H are concentrated compared to baseline and to last labs   - no symptoms c/w acute chest syndrome   - s/p treatment w/ pain meds in ED   - continue IV fluids for hydration   - dilaudid 2mg q3 PRN, Norco PRN, MS contin TID      Upper respiratory infection  - Symptoms improving from last admission, not fully resolved   - continue supportive care   - RIP       Chronic gastritis  - continue carafate, famotidine, and bentyl   - states she also takes reglan      History of pulmonary embolism  - recently taken off of Eliquis and started on Lovenox as Eliquis was too expensive   - now she reports the Lovenox is too expensive and she ran out two days ago and had not taken it   - Reports will be able to afford it when brother gets next pay cheque   - s/p full weight based dose of lovenox given in ED   - continue  on lovenox while admitted       Intermittent asthma  - no apparent exacerbation  - PRN O2   - monitor       Anxiety and depression  On home fluoxetine 40mg    Seen by tele psych for depression and life stressors. Denies SI. Rec increase fluoxetine to 80 mg daily for mood and start prazosin 1 mg qhs for trauma nightmares              Hypertension  - continue home amlodipine        VTE Risk Mitigation (From admission, onward)           Ordered     enoxaparin injection 100 mg  Every 12 hours         02/27/25 0129                    Discharge Planning   ALYSE: 3/7/2025     Code Status: Prior   Medical Readiness for Discharge Date:   Discharge Plan A: Home with family                        Sherine Foreman MD  Department of Hospital Medicine   [facility] - Emergency Dept

## 2025-02-27 NOTE — ASSESSMENT & PLAN NOTE
- recently taken off of Eliquis and started on Lovenox as Eliquis was too expensive   - now she reports the Lovenox is too expensive and she ran out two days ago and had not taken it   - Reports will be able to afford it when brother gets next pay cheque   - s/p full weight based dose of lovenox given in ED   - continue on lovenox while admitted

## 2025-02-27 NOTE — HOSPITAL COURSE
Reports right leg pain and left hip pain are very severe. Treat sickle cell crisis. Wean as able. Due to hospital shortage, dilaudid changed to morphine. Continues to have symptoms of URTI but feels it is improving. RIP neg. Seen by psych for depression and life stressors. Denies SI. Rec increase fluoxetine to 80 mg daily for mood and start prazosin 1 mg qhs for trauma nightmares. Info for BH on her mychart.  Wean as able. 2/28 - FLOR. Kidney US and urine studies and CK (given hx of rhabdo).  CK not elevated. FLOR improved. Pain improved and stable for discharge. MC contin filled from our pharmacy prior to discharge.

## 2025-02-27 NOTE — ED NOTES
"Went to give pt norco for pain, explained to pt that morphine isnt due until 6. Pt states " why do I not have anything ordered IV? Can you ask the provider. I can take PO meds at home. I am hurting" -- Angelica notified   "

## 2025-02-27 NOTE — ASSESSMENT & PLAN NOTE
- hemo-concentrated today compared to baseline   - Anemia is likely due to chronic blood loss. Most recent hemoglobin and hematocrit are listed below.  Recent Labs     02/27/25  0029   HGB 10.0*   HCT 29.6*     Plan  - Monitor serial CBC: Daily  - Transfuse PRBC if patient becomes hemodynamically unstable, symptomatic or H/H drops below 7/21.  - Patient has not received any PRBC transfusions to date  - Patient's anemia is currently stable

## 2025-02-27 NOTE — ED NOTES
Angelica at bedside talking to pt.     Per verbal order, given oral morphine with IV morphine. Will order until PCA pump is available. Once PCA pump is connected, all other meds will be d/c'd

## 2025-02-27 NOTE — HPI
Ms. Nazanin Malone is a 44 y.o. female, with PMH of sickle cell anemia, multiple prior PEs on chronic anticoagulation (intermittently compliant with Eliquis due to cost so changed to Lovenox during recent admission), asthma, HTN, who presented to [facility] ED on 2/26/25 due to worsening chronic pain of her b/l lower extremities (R>L) which she states is c/w her sickle cell pain as during a pain crisis. She further reports that she has had cold symptoms x 1 week (nasal congestion, sore throat, headache, dry cough, fever), and notes chronic generalized abdominal pain with nausea and cough with shortness of breath (chronic, not worsened from baseline), nausea. She has been taking her Norco, gabapentin, and tizanidine without pain relief. She states she has been having shortness of breath since she was diagnosed with pneumonia. Earlier today she reports a temperature of 100.8F at home. She denies cough.     She was recently admitted to [facility] for sickle cell pain crisis from 2/17 - 2/22, and also from 2/2 -2/6. During her most recent admission she required a PCA pump for pain management. She reports she was discharged with Rx for oxycodone, MS contin, and Lovenox. She states she did not fill her Lovenox as she was unable to afford it, but also states she has only been off of her Lovenox x 2 days. Reports when brother gets pay check he will be able to pay for it. Also reported that unable to obtain MS contin from multiple pharmacies due to shortage.     In the ED today, labs show H&H of 10.0/39.6 without leukocytosis or left shift. A metabolic panel shows no acute findings. Reticulocyte count is not elevated. A CXR showed right sided chest port in stable position without further acute cardiopulmonary findings. She had an US of the b/l LE veins that was negative for DVT. She denied shortness of breath worsened from baseline, chest pain, or any other symptom concerning for PE. She was treated in the ED with pain  meds. She was placed on observation.

## 2025-02-27 NOTE — ASSESSMENT & PLAN NOTE
Nephrology  Note                                                                                                                                                                                                                                                                                                                                                               Office : [phone]     Fax :[phone]              Patient's Name: Chana Angel  11:28 AM  3/29/2022    Reason for Consult:  Hyponatremia   Requesting Physician:  Lenka Wilcox, APRN - CNP      Na stable   Fever + post thrombectomy   On IVF       Past Medical History:   Diagnosis Date    Thyroid nodule        Past Surgical History:   Procedure Laterality Date    CRANIOTOMY Left 3/12/2022    LEFT TEMPORAL PARIETAL OCCIPITAL CRANIOTOMY FOR TUMOR RESECTION performed by Nura Philip MD at [address] IR IVC [address] N/A 03/16/2022    [facility]    IR IVC FILTER PLACEMENT W IMAGING  3/16/2022    IR IVC FILTER PLACEMENT W IMAGING 3/16/2022 [address] SPECIAL PROCEDURES    IR THROMB [address] VEIN  3/28/2022    IR THROMB [address] 3/28/2022 [facility] SPECIAL PROCEDURES       Family History   Problem Relation Age of Onset    Cancer Father         reports that she has never smoked. She has never used smokeless tobacco. She reports that she does not drink alcohol and does not use drugs. Allergies:  Patient has no known allergies.     Current Medications:    0.9 % sodium chloride infusion, PRN  lidocaine PF 1 % injection 5 mL, Once  sodium chloride flush 0.9 % injection 5-40 mL, 2 times per day  sodium chloride flush 0.9 % injection 5-40 mL, PRN  0.9 % sodium chloride infusion, PRN  sodium bicarbonate 100 mEq in sodium chloride 0.9 % 1,000 mL infusion, Continuous  metoprolol (LOPRESSOR) injection 5 mg, Once  oxyCODONE (ROXICODONE) immediate release tablet 5 mg, Q4H PRN   Or  oxyCODONE (ROXICODONE) immediate release On home fluoxetine 40mg    Seen by tele psych for depression and life stressors. Denies SI. Rec increase fluoxetine to 80 mg daily for mood and start prazosin 1 mg qhs for trauma nightmares             tablet 10 mg, Q4H PRN  LORazepam (ATIVAN) injection 1 mg, Q6H PRN  [Held by provider] clonazePAM (KLONOPIN) tablet 0.5 mg, BID  QUEtiapine (SEROQUEL) tablet 12.5 mg, Nightly  meropenem (MERREM) 2,000 mg in sodium chloride 0.9 % 100 mL IVPB, Q8H  vancomycin (VANCOCIN) 1,250 mg in dextrose 5 % 250 mL IVPB, Q12H  insulin regular (HUMULIN R;NOVOLIN R) injection 10 Units, Once   And  dextrose 10 % infusion, Once  glucose (GLUTOSE) 40 % oral gel 15 g, PRN  dextrose 50 % IV solution, PRN  glucagon (rDNA) injection 1 mg, PRN  dextrose 5 % solution, PRN  argatroban infusion 50mg in 0.9% sodium chloride 50 mL (premix), Continuous  lacosamide (VIMPAT) tablet 200 mg, BID  levETIRAcetam (KEPPRA) tablet 2,000 mg, Daily  levETIRAcetam (KEPPRA) tablet 2,500 mg, Nightly  melatonin disintegrating tablet 5 mg, Nightly  pantoprazole (PROTONIX) tablet 40 mg, QAM AC  sodium chloride flush 0.9 % injection 5-40 mL, 2 times per day  sodium chloride flush 0.9 % injection 5-40 mL, PRN  0.9 % sodium chloride infusion, PRN  ondansetron (ZOFRAN-ODT) disintegrating tablet 4 mg, Q8H PRN   Or  ondansetron (ZOFRAN) injection 4 mg, Q6H PRN  polyethylene glycol (GLYCOLAX) packet 17 g, Daily PRN  acetaminophen (TYLENOL) tablet 650 mg, Q6H PRN   Or  acetaminophen (TYLENOL) suppository 650 mg, Q6H PRN          Physical exam:     Vitals:  /73   Pulse 126   Temp 98.8 °F (37.1 °C) (Axillary)   Resp 16   Ht 5' 3\" (1.6 m)   Wt 202 lb 13.2 oz (92 kg)   SpO2 98%   BMI 35.93 kg/m²   Constitutional:  OAA X3 NAD  Skin: no rash, turgor wnl  Heent:  eomi, mmm  Neck: no bruits or jvd noted  Cardiovascular:  S1, S2 without m/r/g  Respiratory: CTA B without w/r/r  Abdomen:  +bs, soft, nt, nd  Ext: + lower extremity edema  Psychiatric: mood and affect appropriate  Musculoskeletal:  Rom, muscular strength intact    Data:   Labs:  CBC:   Recent Labs     03/27/22  1135 03/27/22  1947 03/28/22  0641 03/28/22  1304 03/28/22  1800 03/29/22  0100 03/29/22  7759 WBC 13.8*  --  16.2*  --   --   --  16.6*   HGB 7.7*   < > 7.7*   < > 7.7* 6.5* 7.5*   *  --  180  --   --   --  205    < > = values in this interval not displayed. BMP:    Recent Labs     03/27/22  1135 03/28/22  0641 03/29/22  0513   * 124* 125*   K 4.8 4.8 4.9   CL 94* 93* 95*   CO2 18* 18* 16*   BUN 13 14 17   CREATININE 0.6 0.6 0.6   GLUCOSE 120* 128* 123*     Ca/Mg/Phos:   Recent Labs     03/27/22  1135 03/28/22  0641 03/29/22  0513   CALCIUM 8.5 8.7 8.3   MG  --   --  2.20   PHOS  --   --  3.7     Hepatic:   No results for input(s): AST, ALT, ALB, BILITOT, ALKPHOS in the last 72 hours. Troponin: No results for input(s): TROPONINI in the last 72 hours. BNP: No results for input(s): BNP in the last 72 hours. Lipids: No results for input(s): CHOL, TRIG, HDL, LDLCALC, LABVLDL in the last 72 hours. ABGs:   Recent Labs     03/29/22  1055   PHART 7.466*   PO2ART 84.9   LCS6BOD 31.3*     INR:   No results for input(s): INR in the last 72 hours. UA:  Recent Labs     03/27/22  1218   COLORU Yellow   CLARITYU Clear   GLUCOSEU Negative   BILIRUBINUR Negative   KETUA Negative   SPECGRAV >=1.030   BLOODU Negative   PHUR 6.0   PROTEINU TRACE*   UROBILINOGEN 0.2   NITRU Negative   LEUKOCYTESUR Negative   LABMICR YES   URINETYPE NotGiven      Urine Microscopic:   Recent Labs     03/27/22  1218   BACTERIA 3+*   WBCUA 0-2   RBCUA 0-2   EPIU 0-1     Urine Culture:   Recent Labs     03/27/22  1155   LABURIN >100,000 CFU/ml  Sensitivity to follow       Urine Chemistry:   Recent Labs     03/28/22  1105   NAUR <20             IMAGING:  CT HEAD WO CONTRAST   Final Result      No acute or interval change. No hemorrhage or mass effect. CT ABDOMEN PELVIS W IV CONTRAST Additional Contrast? None   Final Result      Partial recannulization of the inferior vena cava, iliac veins, and femoral veins.       IR GUIDED THROMB [facility] VEIN   Final Result   Impression: Technically successful inferior venacavogram and bilateral iliac venogram demonstrating marked ileal caval clot associated with the filter including thrombus above the IVC filter. Technically successful vacuum-assisted thrombectomy with restoration of flow through the inferior vena cava and improved patency of the ileal caval system. Clot remains within the inferior vena cava filter. VL Extremity Venous Bilateral         XR CHEST PORTABLE   Final Result      Limited evaluation of lower right lung due to overlapping density for which infiltrate cannot be excluded. Otherwise no acute process seen. CT HEAD WO CONTRAST   Final Result      Postsurgical changes with areas of low cortical attenuation in the left parietal and left occipital region appear essentially stable from prior study with associated mild midline shift which is similar to slightly improved from prior study. No evidence of developing acute intracranial hemorrhage. CTA ABDOMEN PELVIS W WO CONTRAST   Final Result   1. Thrombosis of the inferior vena cava at the level of the inferior vena cava filter with marked distention of the caval bifurcation and common iliac veins. 2. Surrounding hazy density, likely related to venous congestion. However, streaky densities extending inferiorly on the right may represent blood. However, there is no evidence of retroperitoneal hematoma. Correlation with lower extremity noninvasive    duplex Doppler is recommended in addition to serial hemoglobin and crit levels. 3. Suspect bilateral lower lobe pulmonary emboli. See comments above      Redemonstrated is a large necrotic mass involving the right breast enlarged fibroid uterus. Assessment/Plan   1. Hyponatremia     2. HTN    3. Anemia    4. Acid- base/ Electrolyte imbalance     5.  SIADH     Plan   - IV hypertonic sol  - Ur studies - reviewed   - TSH - nl   - Monitor Na   - If Na not better will give Samsca   - d/w pt and family in length Thank you for allowing us to participate in care of Marlyn Bhakta MD  Feel free to contact me   [facility] [address]  Office : [phone]  Fax :[phone]

## 2025-02-27 NOTE — PLAN OF CARE
Case Management Assessment     PCP: Kezia primary doctor  Pharmacy: [facility]    Patient Arrived From: home  Existing Help at Home: family    Barriers to Discharge: medication costs    Discharge Plan:    A. Home with family   B. Home with family      Assessment completed with patient at bedside--patient alert and oriented. Patient will transport self home at discharge.        [facility] - Emergency Dept  Initial Discharge Assessment       Primary Care Provider: Kezia, Primary Doctor    Admission Diagnosis: Sickle cell pain crisis [D57.00]    Admission Date: 2/26/2025  Expected Discharge Date:     Transition of Care Barriers: None    Payor: [employer] MANAGED MEDICARE / Plan: [employer] MEDICARE PLAN HMO / Product Type: Medicare Advantage /     Extended Emergency Contact Information  Primary Emergency Contact: Mercedezde,Jack  Mobile Phone: [phone]  Relation: Brother  Secondary Emergency Contact: KiraTimoteo  Mobile Phone: [phone]  Relation: Daughter    Discharge Plan A: Home with family  Discharge Plan B: Home with family      [facility] [address] [address]  Phone: [phone] Fax: [phone]      Initial Assessment (most recent)       Adult Discharge Assessment - 02/27/25 1325          Discharge Assessment    Assessment Type Discharge Planning Assessment     Confirmed/corrected address, phone number and insurance Yes     Confirmed Demographics Correct on Facesheet     Source of Information patient     Communicated ALYSE with patient/caregiver Date not available/Unable to determine     People in Home parent(s);grandchild(jayce);child(jayce), adult     Do you expect to return to your current living situation? Yes     Do you have help at home or someone to help you manage your care at home? No     Prior to hospitilization cognitive status: Alert/Oriented     Current cognitive status: Alert/Oriented     Walking or Climbing Stairs Difficulty no     Dressing/Bathing Difficulty no      Equipment Currently Used at Home oxygen     Readmission within 30 days? Yes     Patient currently being followed by outpatient case management? No     Do you currently have service(s) that help you manage your care at home? No     Do you take prescription medications? Yes     Do you have prescription coverage? Yes     Coverage [employer] MM     Do you have any problems affording any of your prescribed medications? Yes     If yes, what medications? jardiance, lovenox     Is the patient taking medications as prescribed? yes     Who is going to help you get home at discharge? self     How do you get to doctors appointments? car, drives self     Are you on dialysis? No     Do you take coumadin? No     Discharge Plan A Home with family     Discharge Plan B Home with family     DME Needed Upon Discharge  none     Discharge Plan discussed with: Patient     Transition of Care Barriers None        OTHER    Name(s) of People in Home MotherTimoteo (daughter) and 2 grandkids

## 2025-02-27 NOTE — ASSESSMENT & PLAN NOTE
- Ms. Nazanin Malone presents due to continued / worsening sickle cell crisis pain that has worsened since d/c from [facility]   - retic count not elevated   - H&H are concentrated compared to baseline and to last labs   - no symptoms c/w acute chest syndrome   - s/p treatment w/ pain meds in ED   - continue IV fluids for hydration   - will initiate PCA pump for treatment of sickle cell pain

## 2025-02-27 NOTE — ASSESSMENT & PLAN NOTE
- recently taken off of Eliquis and started on Lovenox as Eliquis was too expensive   - now she reports the Lovenox is too expensive and she ran out two days ago and had not taken it   - s/p full weight based dose of lovenox given in ED   - declines CT PE study at time of admission  - continue on lovenox while admitted

## 2025-02-27 NOTE — ED NOTES
Sickle cell pain in bilateral legs and generalized abd pain. Pt states she has had cold sx x1 week, not improving. Endorses cough, sob, nausea. Hydrocodone last taken at 1700. Hx of PE. Pt denies fever.      Pt aaox4

## 2025-02-28 DIAGNOSIS — D57.00 SICKLE CELL PAIN CRISIS: ICD-10-CM

## 2025-02-28 LAB
ABO + RH BLD: ABNORMAL
ALBUMIN SERPL BCP-MCNC: 3.5 G/DL (ref 3.5–5.2)
ALP SERPL-CCNC: 80 U/L (ref 40–150)
ALT SERPL W/O P-5'-P-CCNC: 14 U/L (ref 10–44)
ANION GAP SERPL CALC-SCNC: 10 MMOL/L (ref 8–16)
AST SERPL-CCNC: 26 U/L (ref 10–40)
BILIRUB SERPL-MCNC: 0.6 MG/DL (ref 0.1–1)
BILIRUB UR QL STRIP: NEGATIVE
BLD GP AB SCN CELLS X3 SERPL QL: ABNORMAL
BLOOD GROUP ANTIBODIES SERPL: NORMAL
BUN SERPL-MCNC: 24 MG/DL (ref 6–20)
CALCIUM SERPL-MCNC: 9.4 MG/DL (ref 8.7–10.5)
CHLORIDE SERPL-SCNC: 105 MMOL/L (ref 95–110)
CK SERPL-CCNC: 96 U/L (ref 20–180)
CLARITY UR: CLEAR
CO2 SERPL-SCNC: 21 MMOL/L (ref 23–29)
COLOR UR: COLORLESS
CREAT SERPL-MCNC: 1.7 MG/DL (ref 0.5–1.4)
CREAT UR-MCNC: 35.8 MG/DL (ref 15–325)
ERYTHROCYTE [DISTWIDTH] IN BLOOD BY AUTOMATED COUNT: 23.3 % (ref 11.5–14.5)
EST. GFR  (NO RACE VARIABLE): 37 ML/MIN/1.73 M^2
GLUCOSE SERPL-MCNC: 95 MG/DL (ref 70–110)
GLUCOSE UR QL STRIP: NEGATIVE
HCT VFR BLD AUTO: 26.8 % (ref 37–48.5)
HGB BLD-MCNC: 8.8 G/DL (ref 12–16)
HGB UR QL STRIP: NEGATIVE
KETONES UR QL STRIP: NEGATIVE
LEUKOCYTE ESTERASE UR QL STRIP: NEGATIVE
MCH RBC QN AUTO: 23.3 PG (ref 27–31)
MCHC RBC AUTO-ENTMCNC: 32.8 G/DL (ref 32–36)
MCV RBC AUTO: 71 FL (ref 82–98)
NITRITE UR QL STRIP: NEGATIVE
PH UR STRIP: 6 [PH] (ref 5–8)
PLATELET # BLD AUTO: 364 K/UL (ref 150–450)
PMV BLD AUTO: 8.9 FL (ref 9.2–12.9)
POTASSIUM SERPL-SCNC: 4.8 MMOL/L (ref 3.5–5.1)
PROT SERPL-MCNC: 7.6 G/DL (ref 6–8.4)
PROT UR QL STRIP: NEGATIVE
RBC # BLD AUTO: 3.78 M/UL (ref 4–5.4)
SODIUM SERPL-SCNC: 136 MMOL/L (ref 136–145)
SODIUM UR-SCNC: <20 MMOL/L (ref 20–250)
SP GR UR STRIP: 1 (ref 1–1.03)
SPECIMEN OUTDATE: ABNORMAL
URN SPEC COLLECT METH UR: ABNORMAL
UROBILINOGEN UR STRIP-ACNC: NEGATIVE EU/DL
WBC # BLD AUTO: 10.7 K/UL (ref 3.9–12.7)

## 2025-02-28 PROCEDURE — 96361 HYDRATE IV INFUSION ADD-ON: CPT

## 2025-02-28 PROCEDURE — 82550 ASSAY OF CK (CPK): CPT | Performed by: STUDENT IN AN ORGANIZED HEALTH CARE EDUCATION/TRAINING PROGRAM

## 2025-02-28 PROCEDURE — 96376 TX/PRO/DX INJ SAME DRUG ADON: CPT

## 2025-02-28 PROCEDURE — 82570 ASSAY OF URINE CREATININE: CPT | Performed by: STUDENT IN AN ORGANIZED HEALTH CARE EDUCATION/TRAINING PROGRAM

## 2025-02-28 PROCEDURE — 80053 COMPREHEN METABOLIC PANEL: CPT | Performed by: STUDENT IN AN ORGANIZED HEALTH CARE EDUCATION/TRAINING PROGRAM

## 2025-02-28 PROCEDURE — 25000003 PHARM REV CODE 250: Performed by: PHYSICIAN ASSISTANT

## 2025-02-28 PROCEDURE — 84300 ASSAY OF URINE SODIUM: CPT | Performed by: STUDENT IN AN ORGANIZED HEALTH CARE EDUCATION/TRAINING PROGRAM

## 2025-02-28 PROCEDURE — 86900 BLOOD TYPING SEROLOGIC ABO: CPT | Performed by: STUDENT IN AN ORGANIZED HEALTH CARE EDUCATION/TRAINING PROGRAM

## 2025-02-28 PROCEDURE — 63600175 PHARM REV CODE 636 W HCPCS: Mod: JZ,TB | Performed by: STUDENT IN AN ORGANIZED HEALTH CARE EDUCATION/TRAINING PROGRAM

## 2025-02-28 PROCEDURE — 25000242 PHARM REV CODE 250 ALT 637 W/ HCPCS: Performed by: PHYSICIAN ASSISTANT

## 2025-02-28 PROCEDURE — 96372 THER/PROPH/DIAG INJ SC/IM: CPT | Performed by: EMERGENCY MEDICINE

## 2025-02-28 PROCEDURE — 63600175 PHARM REV CODE 636 W HCPCS: Performed by: EMERGENCY MEDICINE

## 2025-02-28 PROCEDURE — 85027 COMPLETE CBC AUTOMATED: CPT | Performed by: STUDENT IN AN ORGANIZED HEALTH CARE EDUCATION/TRAINING PROGRAM

## 2025-02-28 PROCEDURE — 21400001 HC TELEMETRY ROOM

## 2025-02-28 PROCEDURE — 36415 COLL VENOUS BLD VENIPUNCTURE: CPT | Performed by: STUDENT IN AN ORGANIZED HEALTH CARE EDUCATION/TRAINING PROGRAM

## 2025-02-28 PROCEDURE — 25000003 PHARM REV CODE 250: Performed by: EMERGENCY MEDICINE

## 2025-02-28 PROCEDURE — 25000003 PHARM REV CODE 250: Performed by: STUDENT IN AN ORGANIZED HEALTH CARE EDUCATION/TRAINING PROGRAM

## 2025-02-28 PROCEDURE — 81003 URINALYSIS AUTO W/O SCOPE: CPT | Performed by: STUDENT IN AN ORGANIZED HEALTH CARE EDUCATION/TRAINING PROGRAM

## 2025-02-28 PROCEDURE — 86870 RBC ANTIBODY IDENTIFICATION: CPT | Performed by: STUDENT IN AN ORGANIZED HEALTH CARE EDUCATION/TRAINING PROGRAM

## 2025-02-28 RX ORDER — MORPHINE SULFATE 30 MG/1
30 TABLET, FILM COATED, EXTENDED RELEASE ORAL 3 TIMES DAILY
Qty: 90 TABLET | Refills: 0 | Status: SHIPPED | OUTPATIENT
Start: 2025-02-28 | End: 2025-03-30

## 2025-02-28 RX ORDER — MORPHINE SULFATE 30 MG/1
30 TABLET, FILM COATED, EXTENDED RELEASE ORAL EVERY 8 HOURS
Qty: 21 TABLET | Refills: 0 | Status: SHIPPED | OUTPATIENT
Start: 2025-02-28 | End: 2025-02-28 | Stop reason: HOSPADM

## 2025-02-28 RX ORDER — MORPHINE SULFATE 2 MG/ML
15 INJECTION, SOLUTION INTRAMUSCULAR; INTRAVENOUS ONCE
Status: DISCONTINUED | OUTPATIENT
Start: 2025-02-28 | End: 2025-02-28

## 2025-02-28 RX ORDER — MUPIROCIN 20 MG/G
OINTMENT TOPICAL 2 TIMES DAILY
Status: DISCONTINUED | OUTPATIENT
Start: 2025-02-28 | End: 2025-03-02 | Stop reason: HOSPADM

## 2025-02-28 RX ORDER — MORPHINE SULFATE 4 MG/ML
8 INJECTION, SOLUTION INTRAMUSCULAR; INTRAVENOUS
Status: DISCONTINUED | OUTPATIENT
Start: 2025-02-28 | End: 2025-03-02 | Stop reason: HOSPADM

## 2025-02-28 RX ORDER — DIPHENHYDRAMINE HYDROCHLORIDE 50 MG/ML
50 INJECTION, SOLUTION INTRAMUSCULAR; INTRAVENOUS
Status: DISCONTINUED | OUTPATIENT
Start: 2025-02-28 | End: 2025-03-02 | Stop reason: HOSPADM

## 2025-02-28 RX ORDER — HYDROMORPHONE HYDROCHLORIDE 1 MG/ML
1 INJECTION, SOLUTION INTRAMUSCULAR; INTRAVENOUS; SUBCUTANEOUS
Status: DISCONTINUED | OUTPATIENT
Start: 2025-02-28 | End: 2025-02-28

## 2025-02-28 RX ORDER — DIPHENHYDRAMINE HYDROCHLORIDE 50 MG/ML
25 INJECTION, SOLUTION INTRAMUSCULAR; INTRAVENOUS ONCE
Status: COMPLETED | OUTPATIENT
Start: 2025-02-28 | End: 2025-02-28

## 2025-02-28 RX ORDER — HYDROMORPHONE HYDROCHLORIDE 2 MG/ML
3 INJECTION, SOLUTION INTRAMUSCULAR; INTRAVENOUS; SUBCUTANEOUS ONCE
Status: DISCONTINUED | OUTPATIENT
Start: 2025-02-28 | End: 2025-02-28

## 2025-02-28 RX ORDER — MORPHINE SULFATE 4 MG/ML
15 INJECTION, SOLUTION INTRAMUSCULAR; INTRAVENOUS ONCE
Refills: 0 | Status: COMPLETED | OUTPATIENT
Start: 2025-02-28 | End: 2025-02-28

## 2025-02-28 RX ADMIN — DICYCLOMINE HYDROCHLORIDE 10 MG: 10 CAPSULE ORAL at 05:02

## 2025-02-28 RX ADMIN — SUCRALFATE ORAL SUSPENSION 1 G: 1 SUSPENSION ORAL at 10:02

## 2025-02-28 RX ADMIN — DIPHENHYDRAMINE HYDROCHLORIDE 25 MG: 50 INJECTION INTRAMUSCULAR; INTRAVENOUS at 05:02

## 2025-02-28 RX ADMIN — TIZANIDINE 4 MG: 4 TABLET ORAL at 05:02

## 2025-02-28 RX ADMIN — TIZANIDINE 4 MG: 4 TABLET ORAL at 10:02

## 2025-02-28 RX ADMIN — TIZANIDINE 4 MG: 4 TABLET ORAL at 02:02

## 2025-02-28 RX ADMIN — PRAZOSIN HYDROCHLORIDE 1 MG: 1 CAPSULE ORAL at 08:02

## 2025-02-28 RX ADMIN — PANTOPRAZOLE SODIUM 40 MG: 40 TABLET, DELAYED RELEASE ORAL at 08:02

## 2025-02-28 RX ADMIN — SUCRALFATE ORAL SUSPENSION 1 G: 1 SUSPENSION ORAL at 05:02

## 2025-02-28 RX ADMIN — FLUOXETINE HYDROCHLORIDE 80 MG: 20 CAPSULE ORAL at 08:02

## 2025-02-28 RX ADMIN — DIPHENHYDRAMINE HYDROCHLORIDE 50 MG: 50 INJECTION INTRAMUSCULAR; INTRAVENOUS at 11:02

## 2025-02-28 RX ADMIN — DIPHENHYDRAMINE HYDROCHLORIDE 50 MG: 50 INJECTION INTRAMUSCULAR; INTRAVENOUS at 05:02

## 2025-02-28 RX ADMIN — MORPHINE SULFATE 15 MG: 4 INJECTION, SOLUTION INTRAMUSCULAR; INTRAVENOUS at 05:02

## 2025-02-28 RX ADMIN — ACETAMINOPHEN 1000 MG: 500 TABLET, FILM COATED ORAL at 10:02

## 2025-02-28 RX ADMIN — HYDROMORPHONE HYDROCHLORIDE 2 MG: 2 INJECTION INTRAMUSCULAR; INTRAVENOUS; SUBCUTANEOUS at 02:02

## 2025-02-28 RX ADMIN — DICYCLOMINE HYDROCHLORIDE 10 MG: 10 CAPSULE ORAL at 10:02

## 2025-02-28 RX ADMIN — ACETAMINOPHEN 1000 MG: 500 TABLET, FILM COATED ORAL at 05:02

## 2025-02-28 RX ADMIN — DIPHENHYDRAMINE HYDROCHLORIDE 50 MG: 50 INJECTION INTRAMUSCULAR; INTRAVENOUS at 02:02

## 2025-02-28 RX ADMIN — MORPHINE SULFATE 30 MG: 15 TABLET, FILM COATED, EXTENDED RELEASE ORAL at 06:02

## 2025-02-28 RX ADMIN — HYDROMORPHONE HYDROCHLORIDE 2 MG: 2 INJECTION INTRAMUSCULAR; INTRAVENOUS; SUBCUTANEOUS at 08:02

## 2025-02-28 RX ADMIN — SUCRALFATE ORAL SUSPENSION 1 G: 1 SUSPENSION ORAL at 11:02

## 2025-02-28 RX ADMIN — DIPHENHYDRAMINE HYDROCHLORIDE 50 MG: 50 INJECTION INTRAMUSCULAR; INTRAVENOUS at 08:02

## 2025-02-28 RX ADMIN — HYDROMORPHONE HYDROCHLORIDE 2 MG: 2 INJECTION INTRAMUSCULAR; INTRAVENOUS; SUBCUTANEOUS at 11:02

## 2025-02-28 RX ADMIN — ACETAMINOPHEN 1000 MG: 500 TABLET, FILM COATED ORAL at 02:02

## 2025-02-28 RX ADMIN — MORPHINE SULFATE 8 MG: 4 INJECTION, SOLUTION INTRAMUSCULAR; INTRAVENOUS at 11:02

## 2025-02-28 RX ADMIN — SODIUM CHLORIDE, POTASSIUM CHLORIDE, SODIUM LACTATE AND CALCIUM CHLORIDE: 600; 310; 30; 20 INJECTION, SOLUTION INTRAVENOUS at 11:02

## 2025-02-28 RX ADMIN — HYDROMORPHONE HYDROCHLORIDE 2 MG: 2 INJECTION INTRAMUSCULAR; INTRAVENOUS; SUBCUTANEOUS at 05:02

## 2025-02-28 RX ADMIN — METOCLOPRAMIDE 5 MG: 5 TABLET ORAL at 05:02

## 2025-02-28 RX ADMIN — ENOXAPARIN SODIUM 100 MG: 100 INJECTION SUBCUTANEOUS at 08:02

## 2025-02-28 RX ADMIN — FOLIC ACID 1 MG: 1 TABLET ORAL at 08:02

## 2025-02-28 RX ADMIN — AMLODIPINE BESYLATE 10 MG: 5 TABLET ORAL at 08:02

## 2025-02-28 RX ADMIN — FLUTICASONE PROPIONATE 50 MCG: 50 SPRAY, METERED NASAL at 08:02

## 2025-02-28 RX ADMIN — MUPIROCIN: 20 OINTMENT TOPICAL at 08:02

## 2025-02-28 RX ADMIN — SODIUM CHLORIDE, POTASSIUM CHLORIDE, SODIUM LACTATE AND CALCIUM CHLORIDE: 600; 310; 30; 20 INJECTION, SOLUTION INTRAVENOUS at 02:02

## 2025-02-28 RX ADMIN — DIPHENHYDRAMINE HYDROCHLORIDE 25 MG: 50 INJECTION INTRAMUSCULAR; INTRAVENOUS at 08:02

## 2025-02-28 RX ADMIN — HYDROXYUREA 1000 MG: 500 CAPSULE ORAL at 08:02

## 2025-02-28 RX ADMIN — DIPHENHYDRAMINE HYDROCHLORIDE 25 MG: 50 INJECTION INTRAMUSCULAR; INTRAVENOUS at 02:02

## 2025-02-28 RX ADMIN — MORPHINE SULFATE 30 MG: 15 TABLET, FILM COATED, EXTENDED RELEASE ORAL at 10:02

## 2025-02-28 RX ADMIN — MORPHINE SULFATE 30 MG: 15 TABLET, FILM COATED, EXTENDED RELEASE ORAL at 02:02

## 2025-02-28 RX ADMIN — DIPHENHYDRAMINE HYDROCHLORIDE 50 MG: 50 INJECTION INTRAMUSCULAR; INTRAVENOUS at 10:02

## 2025-02-28 RX ADMIN — DIPHENHYDRAMINE HYDROCHLORIDE 25 MG: 50 INJECTION INTRAMUSCULAR; INTRAVENOUS at 09:02

## 2025-02-28 RX ADMIN — MORPHINE SULFATE 8 MG: 4 INJECTION, SOLUTION INTRAMUSCULAR; INTRAVENOUS at 08:02

## 2025-02-28 RX ADMIN — METOCLOPRAMIDE 5 MG: 5 TABLET ORAL at 10:02

## 2025-02-28 RX ADMIN — DICYCLOMINE HYDROCHLORIDE 10 MG: 10 CAPSULE ORAL at 08:02

## 2025-02-28 NOTE — SUBJECTIVE & OBJECTIVE
Interval History: Severe itching and discomfort. Increase benadryl. Wean dilaudid this PM.      Review of Systems   HENT:  Positive for congestion.    Respiratory:  Positive for cough. Negative for shortness of breath and wheezing.    Cardiovascular:  Negative for chest pain and leg swelling.   Gastrointestinal:  Negative for abdominal distention, abdominal pain, constipation, diarrhea, nausea and vomiting.   Musculoskeletal:  Positive for arthralgias and myalgias. Negative for back pain, gait problem, neck pain and neck stiffness.   Neurological:  Negative for dizziness, syncope, weakness, light-headedness, numbness and headaches.   Psychiatric/Behavioral:  Negative for agitation and confusion.      Objective:     Vital Signs (Most Recent):  Temp: 98.7 °F (37.1 °C) (02/28/25 1131)  Pulse: 91 (02/28/25 1131)  Resp: 17 (02/28/25 1144)  BP: 129/83 (02/28/25 1131)  SpO2: (!) 94 % (02/28/25 1131) Vital Signs (24h Range):  Temp:  [98 °F (36.7 °C)-98.8 °F (37.1 °C)] 98.7 °F (37.1 °C)  Pulse:  [73-91] 91  Resp:  [16-20] 17  SpO2:  [93 %-98 %] 94 %  BP: ()/(55-85) 129/83     Weight: 99.8 kg (220 lb 0.3 oz)  Body mass index is 40.24 kg/m².    Intake/Output Summary (Last 24 hours) at 2/28/2025 1318  Last data filed at 2/28/2025 0600  Gross per 24 hour   Intake 1818.88 ml   Output 1450 ml   Net 368.88 ml         Physical Exam  Constitutional:       General: She is not in acute distress.  Pulmonary:      Effort: No respiratory distress.      Breath sounds: No wheezing.   Abdominal:      General: There is no distension.   Musculoskeletal:      Right lower leg: No edema.      Left lower leg: No edema.   Skin:     Comments: Right leg old scar    Neurological:      General: No focal deficit present.      Mental Status: She is oriented to person, place, and time.             Significant Labs: All pertinent labs within the past 24 hours have been reviewed.    Significant Imaging: I have reviewed all pertinent imaging  results/findings within the past 24 hours.

## 2025-02-28 NOTE — ASSESSMENT & PLAN NOTE
- Symptoms improving from last admission, not fully resolved   - continue supportive care   - RIP neg

## 2025-02-28 NOTE — ASSESSMENT & PLAN NOTE
- Ms. Nazanin Malone presents due to continued / worsening sickle cell crisis pain that has worsened since d/c from [facility]. Also reported that unable to obtain MS contin from multiple pharmacies due to shortage.   - retic count not elevated   - H&H are concentrated compared to baseline and to last labs   - no symptoms c/w acute chest syndrome   - s/p treatment w/ pain meds in ED   - continue IV fluids for hydration   - dilaudid 2mg q3 PRN, Norco PRN, MS contin TID    - 2/28 - wean to 1mg q3 PRN, pt with severe itching and discomfort - increase benadryl to 50mg q3 PRN

## 2025-02-28 NOTE — PROGRESS NOTES
[facility] Medicine  Progress Note    Patient Name: Nazanin Malone  MRN: [id number]  Patient Class: IP- Inpatient   Admission Date: 2/26/2025  Length of Stay: 0 days  Attending Physician: Sherine Meier MD  Primary Care Provider: Kezia, Primary Doctor          Principal Problem:Sickle cell disease, type S beta-zero thalassemia with crisis        HPI:  Ms. Nazanin Malone is a 44 y.o. female, with PMH of sickle cell anemia, multiple prior PEs on chronic anticoagulation (intermittently compliant with Eliquis due to cost so changed to Lovenox during recent admission), asthma, HTN, who presented to [facility] ED on 2/26/25 due to worsening chronic pain of her b/l lower extremities (R>L) which she states is c/w her sickle cell pain as during a pain crisis. She further reports that she has had cold symptoms x 1 week (nasal congestion, sore throat, headache, dry cough, fever), and notes chronic generalized abdominal pain with nausea and cough with shortness of breath (chronic, not worsened from baseline), nausea. She has been taking her Norco, gabapentin, and tizanidine without pain relief. She states she has been having shortness of breath since she was diagnosed with pneumonia. Earlier today she reports a temperature of 100.8F at home. She denies cough.     She was recently admitted to [facility] for sickle cell pain crisis from 2/17 - 2/22, and also from 2/2 -2/6. During her most recent admission she required a PCA pump for pain management. She reports she was discharged with Rx for oxycodone, MS contin, and Lovenox. She states she did not fill her Lovenox as she was unable to afford it, but also states she has only been off of her Lovenox x 2 days. Reports when brother gets pay check he will be able to pay for it. Also reported that unable to obtain MS contin from multiple pharmacies due to shortage.     In the ED today, labs show H&H of 10.0/39.6 without leukocytosis or left shift. A  metabolic panel shows no acute findings. Reticulocyte count is not elevated. A CXR showed right sided chest port in stable position without further acute cardiopulmonary findings. She had an US of the b/l LE veins that was negative for DVT. She denied shortness of breath worsened from baseline, chest pain, or any other symptom concerning for PE. She was treated in the ED with pain meds. She was placed on observation.     Overview/Hospital Course:  Reports right leg pain and left hip pain are very severe. Treat sickle cell crisis. Wean as able. Continues to have symptoms of URTI but feels it is improving. RIP neg. Seen by psych for depression and life stressors. Denies SI. Rec increase fluoxetine to 80 mg daily for mood and start prazosin 1 mg qhs for trauma nightmares  Wean as able. 2/28 - FLOR. Kidney US and urine studies and CK (given hx of rhabdo).      Interval History: Severe itching and discomfort. Increase benadryl. Wean dilaudid this PM.      Review of Systems   HENT:  Positive for congestion.    Respiratory:  Positive for cough. Negative for shortness of breath and wheezing.    Cardiovascular:  Negative for chest pain and leg swelling.   Gastrointestinal:  Negative for abdominal distention, abdominal pain, constipation, diarrhea, nausea and vomiting.   Musculoskeletal:  Positive for arthralgias and myalgias. Negative for back pain, gait problem, neck pain and neck stiffness.   Neurological:  Negative for dizziness, syncope, weakness, light-headedness, numbness and headaches.   Psychiatric/Behavioral:  Negative for agitation and confusion.      Objective:     Vital Signs (Most Recent):  Temp: 98.7 °F (37.1 °C) (02/28/25 1131)  Pulse: 91 (02/28/25 1131)  Resp: 17 (02/28/25 1144)  BP: 129/83 (02/28/25 1131)  SpO2: (!) 94 % (02/28/25 1131) Vital Signs (24h Range):  Temp:  [98 °F (36.7 °C)-98.8 °F (37.1 °C)] 98.7 °F (37.1 °C)  Pulse:  [73-91] 91  Resp:  [16-20] 17  SpO2:  [93 %-98 %] 94 %  BP: ()/(55-85)  129/83     Weight: 99.8 kg (220 lb 0.3 oz)  Body mass index is 40.24 kg/m².    Intake/Output Summary (Last 24 hours) at 2/28/2025 1318  Last data filed at 2/28/2025 0600  Gross per 24 hour   Intake 1818.88 ml   Output 1450 ml   Net 368.88 ml         Physical Exam  Constitutional:       General: She is not in acute distress.  Pulmonary:      Effort: No respiratory distress.      Breath sounds: No wheezing.   Abdominal:      General: There is no distension.   Musculoskeletal:      Right lower leg: No edema.      Left lower leg: No edema.   Skin:     Comments: Right leg old scar    Neurological:      General: No focal deficit present.      Mental Status: She is oriented to person, place, and time.             Significant Labs: All pertinent labs within the past 24 hours have been reviewed.    Significant Imaging: I have reviewed all pertinent imaging results/findings within the past 24 hours.    Assessment and Plan     * Sickle cell disease, type S beta-zero thalassemia with crisis  - Ms. Nazanin Malone presents due to continued / worsening sickle cell crisis pain that has worsened since d/c from [facility]. Also reported that unable to obtain MS contin from multiple pharmacies due to shortage.   - retic count not elevated   - H&H are concentrated compared to baseline and to last labs   - no symptoms c/w acute chest syndrome   - s/p treatment w/ pain meds in ED   - continue IV fluids for hydration   - dilaudid 2mg q3 PRN, Norco PRN, MS contin TID    - 2/28 - wean to 1mg q3 PRN, pt with severe itching and discomfort - increase benadryl to 50mg q3 PRN     Upper respiratory infection  - Symptoms improving from last admission, not fully resolved   - continue supportive care   - RIP neg       Chronic gastritis  - continue carafate, famotidine, and bentyl   - states she also takes reglan      FLOR (acute kidney injury)  Having urine output   Kidney US and urine studies and CK (given hx of rhabdo).      History of  pulmonary embolism  - recently taken off of Eliquis and started on Lovenox as Eliquis was too expensive   - now she reports the Lovenox is too expensive and she ran out two days ago and had not taken it   - Reports will be able to afford it when brother gets next pay cheque   - s/p full weight based dose of lovenox given in ED   - continue on lovenox      Intermittent asthma  - no apparent exacerbation  - PRN O2   - monitor       Anxiety and depression  On home fluoxetine 40mg    Seen by tele psych for depression and life stressors. Denies SI. Rec increase fluoxetine to 80 mg daily for mood and start prazosin 1 mg qhs for trauma nightmares              Hypertension  - continue home amlodipine    Iron deficiency anemia  - hemo-concentrated today compared to baseline   - Anemia is likely due to chronic blood loss. Most recent hemoglobin and hematocrit are listed below.  Recent Labs     02/27/25  0029   HGB 10.0*   HCT 29.6*     Plan  - Monitor serial CBC: Daily  - Transfuse PRBC if patient becomes hemodynamically unstable, symptomatic or H/H drops below 7/21.  - Patient has not received any PRBC transfusions to date  - Patient's anemia is currently stable      VTE Risk Mitigation (From admission, onward)           Ordered     enoxaparin injection 100 mg  Every 12 hours         02/27/25 0129                    Discharge Planning   ALYSE: 3/7/2025     Code Status: Full Code   Medical Readiness for Discharge Date:   Discharge Plan A: Home with family                        Sherine Foreman MD  Department of Hospital Medicine   [facility] ([place])

## 2025-02-28 NOTE — ASSESSMENT & PLAN NOTE
- recently taken off of Eliquis and started on Lovenox as Eliquis was too expensive   - now she reports the Lovenox is too expensive and she ran out two days ago and had not taken it   - Reports will be able to afford it when brother gets next pay cheque   - s/p full weight based dose of lovenox given in ED   - continue on lovenox

## 2025-02-28 NOTE — PLAN OF CARE
Report received from RN.POC reviewed with the pt.RN assumed care.AAOX4.Room Air.Pain Medication administered round the clock.LR IV Infusion in Progress. Tele monitoring continued(NS).All due medication administered according to the MAR.No chest pain,SOB occurred during the night shift.No fever,chills ,rigors occurred.Observation reviewed and charted.Care explained,Choices provided,Emotionally supported,Empathetic listening and communication provided,Each and every questions answered,Reassurance provided.No falls or injury occurred during the shift.No any  significant event undergo in the shift. Flow sheets updated timely. Purposeful rounding done every 2 hourly. Daily Weight charted,IP/OP maintained and charted. Pt kept under observation through out the shift.    Problem: Adult Inpatient Plan of Care  Goal: Plan of Care Review  Outcome: Progressing  Goal: Patient-Specific Goal (Individualized)  Outcome: Progressing  Goal: Absence of Hospital-Acquired Illness or Injury  Outcome: Progressing  Goal: Optimal Comfort and Wellbeing  Outcome: Progressing  Goal: Readiness for Transition of Care  Outcome: Progressing     Problem: Bariatric Environmental Safety  Goal: Safety Maintained with Care  Outcome: Progressing     Problem: Infection  Goal: Absence of Infection Signs and Symptoms  Outcome: Progressing

## 2025-03-01 LAB
ALBUMIN SERPL BCP-MCNC: 3.5 G/DL (ref 3.5–5.2)
ALP SERPL-CCNC: 75 U/L (ref 40–150)
ALT SERPL W/O P-5'-P-CCNC: 18 U/L (ref 10–44)
ANION GAP SERPL CALC-SCNC: 8 MMOL/L (ref 8–16)
AST SERPL-CCNC: 31 U/L (ref 10–40)
BASOPHILS # BLD AUTO: 0.06 K/UL (ref 0–0.2)
BASOPHILS NFR BLD: 0.8 % (ref 0–1.9)
BILIRUB SERPL-MCNC: 0.5 MG/DL (ref 0.1–1)
BUN SERPL-MCNC: 20 MG/DL (ref 6–20)
CALCIUM SERPL-MCNC: 9.4 MG/DL (ref 8.7–10.5)
CHLORIDE SERPL-SCNC: 107 MMOL/L (ref 95–110)
CO2 SERPL-SCNC: 25 MMOL/L (ref 23–29)
CREAT SERPL-MCNC: 1.3 MG/DL (ref 0.5–1.4)
DIFFERENTIAL METHOD BLD: ABNORMAL
EOSINOPHIL # BLD AUTO: 0.5 K/UL (ref 0–0.5)
EOSINOPHIL NFR BLD: 6.8 % (ref 0–8)
ERYTHROCYTE [DISTWIDTH] IN BLOOD BY AUTOMATED COUNT: 23.1 % (ref 11.5–14.5)
ERYTHROCYTE [DISTWIDTH] IN BLOOD BY AUTOMATED COUNT: 23.1 % (ref 11.5–14.5)
EST. GFR  (NO RACE VARIABLE): 51 ML/MIN/1.73 M^2
GLUCOSE SERPL-MCNC: 84 MG/DL (ref 70–110)
HCT VFR BLD AUTO: 24.4 % (ref 37–48.5)
HCT VFR BLD AUTO: 24.4 % (ref 37–48.5)
HGB BLD-MCNC: 8.1 G/DL (ref 12–16)
HGB BLD-MCNC: 8.1 G/DL (ref 12–16)
IMM GRANULOCYTES # BLD AUTO: 0.02 K/UL (ref 0–0.04)
IMM GRANULOCYTES NFR BLD AUTO: 0.3 % (ref 0–0.5)
LYMPHOCYTES # BLD AUTO: 4.2 K/UL (ref 1–4.8)
LYMPHOCYTES NFR BLD: 59.3 % (ref 18–48)
MCH RBC QN AUTO: 23.5 PG (ref 27–31)
MCH RBC QN AUTO: 23.5 PG (ref 27–31)
MCHC RBC AUTO-ENTMCNC: 33.2 G/DL (ref 32–36)
MCHC RBC AUTO-ENTMCNC: 33.2 G/DL (ref 32–36)
MCV RBC AUTO: 71 FL (ref 82–98)
MCV RBC AUTO: 71 FL (ref 82–98)
MONOCYTES # BLD AUTO: 1 K/UL (ref 0.3–1)
MONOCYTES NFR BLD: 13.4 % (ref 4–15)
NEUTROPHILS # BLD AUTO: 1.4 K/UL (ref 1.8–7.7)
NEUTROPHILS NFR BLD: 19.4 % (ref 38–73)
NRBC BLD-RTO: 2 /100 WBC
PLATELET # BLD AUTO: 314 K/UL (ref 150–450)
PLATELET # BLD AUTO: 314 K/UL (ref 150–450)
PMV BLD AUTO: 8.6 FL (ref 9.2–12.9)
PMV BLD AUTO: 8.6 FL (ref 9.2–12.9)
POTASSIUM SERPL-SCNC: 4.2 MMOL/L (ref 3.5–5.1)
PROT SERPL-MCNC: 7.4 G/DL (ref 6–8.4)
RBC # BLD AUTO: 3.45 M/UL (ref 4–5.4)
RBC # BLD AUTO: 3.45 M/UL (ref 4–5.4)
SODIUM SERPL-SCNC: 140 MMOL/L (ref 136–145)
WBC # BLD AUTO: 7.05 K/UL (ref 3.9–12.7)
WBC # BLD AUTO: 7.05 K/UL (ref 3.9–12.7)

## 2025-03-01 PROCEDURE — 85025 COMPLETE CBC W/AUTO DIFF WBC: CPT | Performed by: STUDENT IN AN ORGANIZED HEALTH CARE EDUCATION/TRAINING PROGRAM

## 2025-03-01 PROCEDURE — 25000003 PHARM REV CODE 250: Performed by: STUDENT IN AN ORGANIZED HEALTH CARE EDUCATION/TRAINING PROGRAM

## 2025-03-01 PROCEDURE — 63600175 PHARM REV CODE 636 W HCPCS: Performed by: STUDENT IN AN ORGANIZED HEALTH CARE EDUCATION/TRAINING PROGRAM

## 2025-03-01 PROCEDURE — 80053 COMPREHEN METABOLIC PANEL: CPT | Performed by: STUDENT IN AN ORGANIZED HEALTH CARE EDUCATION/TRAINING PROGRAM

## 2025-03-01 PROCEDURE — 63600175 PHARM REV CODE 636 W HCPCS: Performed by: EMERGENCY MEDICINE

## 2025-03-01 PROCEDURE — 25000003 PHARM REV CODE 250: Performed by: EMERGENCY MEDICINE

## 2025-03-01 PROCEDURE — 21400001 HC TELEMETRY ROOM

## 2025-03-01 PROCEDURE — 25000242 PHARM REV CODE 250 ALT 637 W/ HCPCS: Performed by: PHYSICIAN ASSISTANT

## 2025-03-01 PROCEDURE — 25000003 PHARM REV CODE 250: Performed by: PHYSICIAN ASSISTANT

## 2025-03-01 RX ADMIN — DIPHENHYDRAMINE HYDROCHLORIDE 50 MG: 50 INJECTION INTRAMUSCULAR; INTRAVENOUS at 09:03

## 2025-03-01 RX ADMIN — TIZANIDINE 4 MG: 4 TABLET ORAL at 10:03

## 2025-03-01 RX ADMIN — HYDROXYUREA 1000 MG: 500 CAPSULE ORAL at 09:03

## 2025-03-01 RX ADMIN — LACTULOSE 20 G: 20 SOLUTION ORAL at 09:03

## 2025-03-01 RX ADMIN — METOCLOPRAMIDE 5 MG: 5 TABLET ORAL at 03:03

## 2025-03-01 RX ADMIN — LACTULOSE 20 G: 20 SOLUTION ORAL at 08:03

## 2025-03-01 RX ADMIN — SODIUM CHLORIDE, POTASSIUM CHLORIDE, SODIUM LACTATE AND CALCIUM CHLORIDE: 600; 310; 30; 20 INJECTION, SOLUTION INTRAVENOUS at 01:03

## 2025-03-01 RX ADMIN — SUCRALFATE ORAL SUSPENSION 1 G: 1 SUSPENSION ORAL at 05:03

## 2025-03-01 RX ADMIN — MUPIROCIN: 20 OINTMENT TOPICAL at 09:03

## 2025-03-01 RX ADMIN — PANTOPRAZOLE SODIUM 40 MG: 40 TABLET, DELAYED RELEASE ORAL at 09:03

## 2025-03-01 RX ADMIN — MORPHINE SULFATE 8 MG: 4 INJECTION, SOLUTION INTRAMUSCULAR; INTRAVENOUS at 07:03

## 2025-03-01 RX ADMIN — DIPHENHYDRAMINE HYDROCHLORIDE 50 MG: 50 INJECTION INTRAMUSCULAR; INTRAVENOUS at 02:03

## 2025-03-01 RX ADMIN — ACETAMINOPHEN 1000 MG: 500 TABLET, FILM COATED ORAL at 03:03

## 2025-03-01 RX ADMIN — MORPHINE SULFATE 8 MG: 4 INJECTION, SOLUTION INTRAMUSCULAR; INTRAVENOUS at 09:03

## 2025-03-01 RX ADMIN — MORPHINE SULFATE 8 MG: 4 INJECTION, SOLUTION INTRAMUSCULAR; INTRAVENOUS at 02:03

## 2025-03-01 RX ADMIN — FLUTICASONE PROPIONATE 50 MCG: 50 SPRAY, METERED NASAL at 09:03

## 2025-03-01 RX ADMIN — DIPHENHYDRAMINE HYDROCHLORIDE 50 MG: 50 INJECTION INTRAMUSCULAR; INTRAVENOUS at 12:03

## 2025-03-01 RX ADMIN — ACETAMINOPHEN 1000 MG: 500 TABLET, FILM COATED ORAL at 09:03

## 2025-03-01 RX ADMIN — DICYCLOMINE HYDROCHLORIDE 10 MG: 10 CAPSULE ORAL at 12:03

## 2025-03-01 RX ADMIN — MORPHINE SULFATE 30 MG: 15 TABLET, FILM COATED, EXTENDED RELEASE ORAL at 05:03

## 2025-03-01 RX ADMIN — ENOXAPARIN SODIUM 100 MG: 100 INJECTION SUBCUTANEOUS at 08:03

## 2025-03-01 RX ADMIN — TIZANIDINE 4 MG: 4 TABLET ORAL at 05:03

## 2025-03-01 RX ADMIN — DICYCLOMINE HYDROCHLORIDE 10 MG: 10 CAPSULE ORAL at 03:03

## 2025-03-01 RX ADMIN — MUPIROCIN: 20 OINTMENT TOPICAL at 08:03

## 2025-03-01 RX ADMIN — MORPHINE SULFATE 30 MG: 15 TABLET, FILM COATED, EXTENDED RELEASE ORAL at 03:03

## 2025-03-01 RX ADMIN — MORPHINE SULFATE 8 MG: 4 INJECTION, SOLUTION INTRAMUSCULAR; INTRAVENOUS at 04:03

## 2025-03-01 RX ADMIN — DICYCLOMINE HYDROCHLORIDE 10 MG: 10 CAPSULE ORAL at 04:03

## 2025-03-01 RX ADMIN — MORPHINE SULFATE 8 MG: 4 INJECTION, SOLUTION INTRAMUSCULAR; INTRAVENOUS at 06:03

## 2025-03-01 RX ADMIN — DIPHENHYDRAMINE HYDROCHLORIDE 50 MG: 50 INJECTION INTRAMUSCULAR; INTRAVENOUS at 07:03

## 2025-03-01 RX ADMIN — FOLIC ACID 1 MG: 1 TABLET ORAL at 09:03

## 2025-03-01 RX ADMIN — PANTOPRAZOLE SODIUM 40 MG: 40 TABLET, DELAYED RELEASE ORAL at 08:03

## 2025-03-01 RX ADMIN — MORPHINE SULFATE 8 MG: 4 INJECTION, SOLUTION INTRAMUSCULAR; INTRAVENOUS at 12:03

## 2025-03-01 RX ADMIN — MORPHINE SULFATE 30 MG: 15 TABLET, FILM COATED, EXTENDED RELEASE ORAL at 09:03

## 2025-03-01 RX ADMIN — ACETAMINOPHEN 1000 MG: 500 TABLET, FILM COATED ORAL at 06:03

## 2025-03-01 RX ADMIN — TIZANIDINE 4 MG: 4 TABLET ORAL at 03:03

## 2025-03-01 RX ADMIN — METOCLOPRAMIDE 5 MG: 5 TABLET ORAL at 12:03

## 2025-03-01 RX ADMIN — PRAZOSIN HYDROCHLORIDE 1 MG: 1 CAPSULE ORAL at 08:03

## 2025-03-01 RX ADMIN — SUCRALFATE ORAL SUSPENSION 1 G: 1 SUSPENSION ORAL at 06:03

## 2025-03-01 RX ADMIN — DICYCLOMINE HYDROCHLORIDE 10 MG: 10 CAPSULE ORAL at 08:03

## 2025-03-01 RX ADMIN — FLUOXETINE HYDROCHLORIDE 80 MG: 20 CAPSULE ORAL at 09:03

## 2025-03-01 RX ADMIN — AMLODIPINE BESYLATE 10 MG: 5 TABLET ORAL at 09:03

## 2025-03-01 RX ADMIN — ENOXAPARIN SODIUM 100 MG: 100 INJECTION SUBCUTANEOUS at 09:03

## 2025-03-01 RX ADMIN — DIPHENHYDRAMINE HYDROCHLORIDE 50 MG: 50 INJECTION INTRAMUSCULAR; INTRAVENOUS at 06:03

## 2025-03-01 RX ADMIN — METOCLOPRAMIDE 5 MG: 5 TABLET ORAL at 06:03

## 2025-03-01 RX ADMIN — DICYCLOMINE HYDROCHLORIDE 10 MG: 10 CAPSULE ORAL at 06:03

## 2025-03-01 RX ADMIN — DIPHENHYDRAMINE HYDROCHLORIDE 50 MG: 50 INJECTION INTRAMUSCULAR; INTRAVENOUS at 04:03

## 2025-03-01 RX ADMIN — SUCRALFATE ORAL SUSPENSION 1 G: 1 SUSPENSION ORAL at 12:03

## 2025-03-01 NOTE — PLAN OF CARE
Problem: Adult Inpatient Plan of Care  Goal: Plan of Care Review  Outcome: Progressing  Goal: Patient-Specific Goal (Individualized)  Outcome: Progressing  Goal: Absence of Hospital-Acquired Illness or Injury  Outcome: Progressing  Goal: Optimal Comfort and Wellbeing  Outcome: Progressing  Goal: Readiness for Transition of Care  Outcome: Progressing     Problem: Bariatric Environmental Safety  Goal: Safety Maintained with Care  Outcome: Progressing     Problem: Infection  Goal: Absence of Infection Signs and Symptoms  Outcome: Progressing     Problem: Acute Kidney Injury/Impairment  Goal: Fluid and Electrolyte Balance  Outcome: Progressing  Goal: Improved Oral Intake  Outcome: Progressing  Goal: Effective Renal Function  Outcome: Progressing

## 2025-03-01 NOTE — ASSESSMENT & PLAN NOTE
- Ms. Nazanin Malone presents due to continued / worsening sickle cell crisis pain that has worsened since d/c from [facility]. Also reported that unable to obtain MS contin from multiple pharmacies due to shortage.   - retic count not elevated   - H&H are concentrated compared to baseline and to last labs   - no symptoms c/w acute chest syndrome   - s/p treatment w/ pain meds in ED   - continue IV fluids for hydration   - dilaudid 2mg q3 PRN, Norco PRN, MS contin TID    - 2/28 - wean to 1mg q3 PRN (8mg morphine equivalent), pt with severe itching and discomfort - increase benadryl to 50mg q3 PRN

## 2025-03-01 NOTE — SUBJECTIVE & OBJECTIVE
Interval History: Feeling better but still with residual leg pain. Hoping to be able to go home tomorrow.       Review of Systems   HENT:  Positive for congestion.    Respiratory:  Positive for cough. Negative for shortness of breath and wheezing.    Cardiovascular:  Negative for chest pain and leg swelling.   Gastrointestinal:  Negative for abdominal distention, abdominal pain, constipation, diarrhea, nausea and vomiting.   Musculoskeletal:  Positive for arthralgias and myalgias. Negative for back pain, gait problem, neck pain and neck stiffness.   Neurological:  Negative for dizziness, syncope, weakness, light-headedness, numbness and headaches.   Psychiatric/Behavioral:  Negative for agitation and confusion.      Objective:     Vital Signs (Most Recent):  Temp: 98.7 °F (37.1 °C) (03/01/25 1107)  Pulse: 79 (03/01/25 1107)  Resp: 18 (03/01/25 1248)  BP: 110/60 (03/01/25 1107)  SpO2: 95 % (03/01/25 1107) Vital Signs (24h Range):  Temp:  [98.6 °F (37 °C)-99.2 °F (37.3 °C)] 98.7 °F (37.1 °C)  Pulse:  [77-91] 79  Resp:  [16-18] 18  SpO2:  [94 %-97 %] 95 %  BP: ()/(58-72) 110/60     Weight: 99.8 kg (220 lb 0.3 oz)  Body mass index is 40.24 kg/m².    Intake/Output Summary (Last 24 hours) at 3/1/2025 1502  Last data filed at 3/1/2025 0739  Gross per 24 hour   Intake 2353.96 ml   Output 1900 ml   Net 453.96 ml         Physical Exam  Constitutional:       General: She is not in acute distress.  Pulmonary:      Effort: No respiratory distress.      Breath sounds: No wheezing.   Abdominal:      General: There is no distension.   Musculoskeletal:      Right lower leg: No edema.      Left lower leg: No edema.   Skin:     Comments: Right leg old scar    Neurological:      General: No focal deficit present.      Mental Status: She is oriented to person, place, and time.             Significant Labs: All pertinent labs within the past 24 hours have been reviewed.    Significant Imaging: I have reviewed all pertinent imaging  results/findings within the past 24 hours.

## 2025-03-01 NOTE — PROGRESS NOTES
[facility] Medicine  Progress Note    Patient Name: Nazanin Malone  MRN: [id number]  Patient Class: IP- Inpatient   Admission Date: 2/26/2025  Length of Stay: 1 days  Attending Physician: hSerine Meier MD  Primary Care Provider: Kezia, Primary Doctor          Principal Problem:Sickle cell disease, type S beta-zero thalassemia with crisis        HPI:  Ms. Nazanin Malone is a 44 y.o. female, with PMH of sickle cell anemia, multiple prior PEs on chronic anticoagulation (intermittently compliant with Eliquis due to cost so changed to Lovenox during recent admission), asthma, HTN, who presented to [facility] ED on 2/26/25 due to worsening chronic pain of her b/l lower extremities (R>L) which she states is c/w her sickle cell pain as during a pain crisis. She further reports that she has had cold symptoms x 1 week (nasal congestion, sore throat, headache, dry cough, fever), and notes chronic generalized abdominal pain with nausea and cough with shortness of breath (chronic, not worsened from baseline), nausea. She has been taking her Norco, gabapentin, and tizanidine without pain relief. She states she has been having shortness of breath since she was diagnosed with pneumonia. Earlier today she reports a temperature of 100.8F at home. She denies cough.     She was recently admitted to [facility] for sickle cell pain crisis from 2/17 - 2/22, and also from 2/2 -2/6. During her most recent admission she required a PCA pump for pain management. She reports she was discharged with Rx for oxycodone, MS contin, and Lovenox. She states she did not fill her Lovenox as she was unable to afford it, but also states she has only been off of her Lovenox x 2 days. Reports when brother gets pay check he will be able to pay for it. Also reported that unable to obtain MS contin from multiple pharmacies due to shortage.     In the ED today, labs show H&H of 10.0/39.6 without leukocytosis or left shift. A  metabolic panel shows no acute findings. Reticulocyte count is not elevated. A CXR showed right sided chest port in stable position without further acute cardiopulmonary findings. She had an US of the b/l LE veins that was negative for DVT. She denied shortness of breath worsened from baseline, chest pain, or any other symptom concerning for PE. She was treated in the ED with pain meds. She was placed on observation.     Overview/Hospital Course:  Reports right leg pain and left hip pain are very severe. Treat sickle cell crisis. Wean as able. Due to hospital shortage, dilaudid changed to morphine. Continues to have symptoms of URTI but feels it is improving. RIP neg. Seen by psych for depression and life stressors. Denies SI. Rec increase fluoxetine to 80 mg daily for mood and start prazosin 1 mg qhs for trauma nightmares  Wean as able. 2/28 - FLOR. Kidney US and urine studies and CK (given hx of rhabdo).  CK not elevated. FLOR improved.     Interval History: Feeling better but still with residual leg pain. Hoping to be able to go home tomorrow.       Review of Systems   HENT:  Positive for congestion.    Respiratory:  Positive for cough. Negative for shortness of breath and wheezing.    Cardiovascular:  Negative for chest pain and leg swelling.   Gastrointestinal:  Negative for abdominal distention, abdominal pain, constipation, diarrhea, nausea and vomiting.   Musculoskeletal:  Positive for arthralgias and myalgias. Negative for back pain, gait problem, neck pain and neck stiffness.   Neurological:  Negative for dizziness, syncope, weakness, light-headedness, numbness and headaches.   Psychiatric/Behavioral:  Negative for agitation and confusion.      Objective:     Vital Signs (Most Recent):  Temp: 98.7 °F (37.1 °C) (03/01/25 1107)  Pulse: 79 (03/01/25 1107)  Resp: 18 (03/01/25 1248)  BP: 110/60 (03/01/25 1107)  SpO2: 95 % (03/01/25 1107) Vital Signs (24h Range):  Temp:  [98.6 °F (37 °C)-99.2 °F (37.3 °C)] 98.7 °F  (37.1 °C)  Pulse:  [77-91] 79  Resp:  [16-18] 18  SpO2:  [94 %-97 %] 95 %  BP: ()/(58-72) 110/60     Weight: 99.8 kg (220 lb 0.3 oz)  Body mass index is 40.24 kg/m².    Intake/Output Summary (Last 24 hours) at 3/1/2025 1502  Last data filed at 3/1/2025 0739  Gross per 24 hour   Intake 2353.96 ml   Output 1900 ml   Net 453.96 ml         Physical Exam  Constitutional:       General: She is not in acute distress.  Pulmonary:      Effort: No respiratory distress.      Breath sounds: No wheezing.   Abdominal:      General: There is no distension.   Musculoskeletal:      Right lower leg: No edema.      Left lower leg: No edema.   Skin:     Comments: Right leg old scar    Neurological:      General: No focal deficit present.      Mental Status: She is oriented to person, place, and time.             Significant Labs: All pertinent labs within the past 24 hours have been reviewed.    Significant Imaging: I have reviewed all pertinent imaging results/findings within the past 24 hours.    Assessment and Plan     * Sickle cell disease, type S beta-zero thalassemia with crisis  - Ms. Nazanin Malone presents due to continued / worsening sickle cell crisis pain that has worsened since d/c from [facility]. Also reported that unable to obtain MS contin from multiple pharmacies due to shortage.   - retic count not elevated   - H&H are concentrated compared to baseline and to last labs   - no symptoms c/w acute chest syndrome   - s/p treatment w/ pain meds in ED   - continue IV fluids for hydration   - dilaudid 2mg q3 PRN, Norco PRN, MS contin TID    - 2/28 - wean to 1mg q3 PRN (8mg morphine equivalent), pt with severe itching and discomfort - increase benadryl to 50mg q3 PRN     Upper respiratory infection  - Symptoms improving from last admission, not fully resolved   - continue supportive care   - RIP neg       Chronic gastritis  - continue carafate, famotidine, and bentyl   - states she also takes reglan      FLOR  (acute kidney injury)  Having urine output   Kidney US and urine studies and CK (given hx of rhabdo).      History of pulmonary embolism  - recently taken off of Eliquis and started on Lovenox as Eliquis was too expensive   - now she reports the Lovenox is too expensive and she ran out two days ago and had not taken it   - Reports will be able to afford it when brother gets next pay cheque   - s/p full weight based dose of lovenox given in ED   - continue on lovenox      Intermittent asthma  - no apparent exacerbation  - PRN O2   - monitor       Anxiety and depression  On home fluoxetine 40mg    Seen by tele psych for depression and life stressors. Denies SI. Rec increase fluoxetine to 80 mg daily for mood and start prazosin 1 mg qhs for trauma nightmares              Hypertension  - continue home amlodipine    Iron deficiency anemia  - hemo-concentrated today compared to baseline   - Anemia is likely due to chronic blood loss. Most recent hemoglobin and hematocrit are listed below.  Recent Labs     02/27/25  0029   HGB 10.0*   HCT 29.6*     Plan  - Monitor serial CBC: Daily  - Transfuse PRBC if patient becomes hemodynamically unstable, symptomatic or H/H drops below 7/21.  - Patient has not received any PRBC transfusions to date  - Patient's anemia is currently stable      VTE Risk Mitigation (From admission, onward)           Ordered     enoxaparin injection 100 mg  Every 12 hours         02/27/25 0129                    Discharge Planning   ALYSE: 3/7/2025     Code Status: Full Code   Medical Readiness for Discharge Date:   Discharge Plan A: Home with family                        Sherine Foreman MD  Department of Hospital Medicine   [facility] ([place])

## 2025-03-02 VITALS
WEIGHT: 220 LBS | RESPIRATION RATE: 17 BRPM | HEIGHT: 62 IN | SYSTOLIC BLOOD PRESSURE: 118 MMHG | TEMPERATURE: 99 F | HEART RATE: 85 BPM | OXYGEN SATURATION: 96 % | DIASTOLIC BLOOD PRESSURE: 60 MMHG | BODY MASS INDEX: 40.48 KG/M2

## 2025-03-02 PROCEDURE — 25000003 PHARM REV CODE 250: Performed by: PHYSICIAN ASSISTANT

## 2025-03-02 PROCEDURE — 63600175 PHARM REV CODE 636 W HCPCS: Performed by: EMERGENCY MEDICINE

## 2025-03-02 PROCEDURE — 63600175 PHARM REV CODE 636 W HCPCS: Performed by: STUDENT IN AN ORGANIZED HEALTH CARE EDUCATION/TRAINING PROGRAM

## 2025-03-02 PROCEDURE — 25000003 PHARM REV CODE 250: Performed by: EMERGENCY MEDICINE

## 2025-03-02 PROCEDURE — 25000003 PHARM REV CODE 250: Performed by: STUDENT IN AN ORGANIZED HEALTH CARE EDUCATION/TRAINING PROGRAM

## 2025-03-02 RX ORDER — FLUOXETINE HYDROCHLORIDE 40 MG/1
80 CAPSULE ORAL DAILY
Qty: 60 CAPSULE | Refills: 11 | Status: SHIPPED | OUTPATIENT
Start: 2025-03-02 | End: 2026-03-02

## 2025-03-02 RX ORDER — PRAZOSIN HYDROCHLORIDE 1 MG/1
1 CAPSULE ORAL NIGHTLY
Qty: 30 CAPSULE | Refills: 11 | Status: SHIPPED | OUTPATIENT
Start: 2025-03-02 | End: 2026-03-02

## 2025-03-02 RX ORDER — HEPARIN 100 UNIT/ML
100 SYRINGE INTRAVENOUS ONCE
Status: COMPLETED | OUTPATIENT
Start: 2025-03-02 | End: 2025-03-02

## 2025-03-02 RX ORDER — MORPHINE SULFATE 30 MG/1
30 TABLET, FILM COATED, EXTENDED RELEASE ORAL EVERY 8 HOURS
Qty: 90 TABLET | Refills: 0 | OUTPATIENT
Start: 2025-03-02

## 2025-03-02 RX ADMIN — MORPHINE SULFATE 8 MG: 4 INJECTION, SOLUTION INTRAMUSCULAR; INTRAVENOUS at 06:03

## 2025-03-02 RX ADMIN — TIZANIDINE 4 MG: 4 TABLET ORAL at 06:03

## 2025-03-02 RX ADMIN — ENOXAPARIN SODIUM 100 MG: 100 INJECTION SUBCUTANEOUS at 09:03

## 2025-03-02 RX ADMIN — DIPHENHYDRAMINE HYDROCHLORIDE 50 MG: 50 INJECTION INTRAMUSCULAR; INTRAVENOUS at 03:03

## 2025-03-02 RX ADMIN — MORPHINE SULFATE 8 MG: 4 INJECTION, SOLUTION INTRAMUSCULAR; INTRAVENOUS at 12:03

## 2025-03-02 RX ADMIN — PANTOPRAZOLE SODIUM 40 MG: 40 TABLET, DELAYED RELEASE ORAL at 09:03

## 2025-03-02 RX ADMIN — MORPHINE SULFATE 30 MG: 15 TABLET, FILM COATED, EXTENDED RELEASE ORAL at 06:03

## 2025-03-02 RX ADMIN — AMLODIPINE BESYLATE 10 MG: 5 TABLET ORAL at 09:03

## 2025-03-02 RX ADMIN — HYDROXYUREA 1000 MG: 500 CAPSULE ORAL at 09:03

## 2025-03-02 RX ADMIN — MORPHINE SULFATE 8 MG: 4 INJECTION, SOLUTION INTRAMUSCULAR; INTRAVENOUS at 09:03

## 2025-03-02 RX ADMIN — FOLIC ACID 1 MG: 1 TABLET ORAL at 09:03

## 2025-03-02 RX ADMIN — SUCRALFATE ORAL SUSPENSION 1 G: 1 SUSPENSION ORAL at 12:03

## 2025-03-02 RX ADMIN — DIPHENHYDRAMINE HYDROCHLORIDE 50 MG: 50 INJECTION INTRAMUSCULAR; INTRAVENOUS at 12:03

## 2025-03-02 RX ADMIN — MORPHINE SULFATE 8 MG: 4 INJECTION, SOLUTION INTRAMUSCULAR; INTRAVENOUS at 03:03

## 2025-03-02 RX ADMIN — DICYCLOMINE HYDROCHLORIDE 10 MG: 10 CAPSULE ORAL at 06:03

## 2025-03-02 RX ADMIN — DIPHENHYDRAMINE HYDROCHLORIDE 50 MG: 50 INJECTION INTRAMUSCULAR; INTRAVENOUS at 06:03

## 2025-03-02 RX ADMIN — METOCLOPRAMIDE 5 MG: 5 TABLET ORAL at 06:03

## 2025-03-02 RX ADMIN — FLUOXETINE HYDROCHLORIDE 80 MG: 20 CAPSULE ORAL at 09:03

## 2025-03-02 RX ADMIN — SUCRALFATE ORAL SUSPENSION 1 G: 1 SUSPENSION ORAL at 06:03

## 2025-03-02 RX ADMIN — LACTULOSE 20 G: 20 SOLUTION ORAL at 09:03

## 2025-03-02 RX ADMIN — HEPARIN 100 UNITS: 100 SYRINGE at 11:03

## 2025-03-02 RX ADMIN — ACETAMINOPHEN 1000 MG: 500 TABLET, FILM COATED ORAL at 06:03

## 2025-03-02 RX ADMIN — DIPHENHYDRAMINE HYDROCHLORIDE 50 MG: 50 INJECTION INTRAMUSCULAR; INTRAVENOUS at 09:03

## 2025-03-02 RX ADMIN — SODIUM CHLORIDE, POTASSIUM CHLORIDE, SODIUM LACTATE AND CALCIUM CHLORIDE: 600; 310; 30; 20 INJECTION, SOLUTION INTRAVENOUS at 12:03

## 2025-03-02 NOTE — PLAN OF CARE
Report received from RN.POC reviewed with the pt.RN assumed care.AAOX4.Room Air.Pain Medication administered round the clock.LR IV Infusion in Progress. Tele monitoring continued(NS).All due medication administered according to the MAR.No chest pain,SOB occurred during the night shift.No fever,chills ,rigors occurred.Observation reviewed and charted.Care explained,No falls or injury occurred during the shift.No any  significant event undergo in the shift. Flow sheets updated timely. Purposeful rounding done every 2 hourly. Daily Weight charted,IP/OP maintained and charted. Pt kept under observation through out the shift     Problem: Adult Inpatient Plan of Care  Goal: Plan of Care Review  Outcome: Progressing  Goal: Patient-Specific Goal (Individualized)  Outcome: Progressing  Goal: Absence of Hospital-Acquired Illness or Injury  Outcome: Progressing  Goal: Optimal Comfort and Wellbeing  Outcome: Progressing  Goal: Readiness for Transition of Care  Outcome: Progressing     Problem: Bariatric Environmental Safety  Goal: Safety Maintained with Care  Outcome: Progressing     Problem: Infection  Goal: Absence of Infection Signs and Symptoms  Outcome: Progressing     Problem: Acute Kidney Injury/Impairment  Goal: Fluid and Electrolyte Balance  Outcome: Progressing  Goal: Improved Oral Intake  Outcome: Progressing  Goal: Effective Renal Function  Outcome: Progressing

## 2025-03-02 NOTE — DISCHARGE SUMMARY
[facility] Medicine  Discharge Summary      Patient Name: Nazanin Malone  MRN: [id number]  [facility]: [id number]  Patient Class: IP- Inpatient  Admission Date: 2/26/2025  Hospital Length of Stay: 2 days  Discharge Date and Time:  03/02/2025 11:16 AM  Attending Physician: Sherine Meier MD   Discharging Provider: Sherine Foreman MD  Primary Care Provider: Kezia Primary Doctor    Primary Care Team: Networked reference to record PCT     HPI:   Ms. Nazanin Malone is a 44 y.o. female, with PMH of sickle cell anemia, multiple prior PEs on chronic anticoagulation (intermittently compliant with Eliquis due to cost so changed to Lovenox during recent admission), asthma, HTN, who [place] [facility] [place] due to worsening chronic pain of her b/l lower extremities (R>L) which she states is c/w her sickle cell pain as during a pain crisis. She further reports that she has had cold symptoms x 1 week (nasal congestion, sore throat, headache, dry cough, fever), and notes chronic generalized abdominal pain with nausea and cough with shortness of breath (chronic, not worsened from baseline), nausea. She has been taking her Norco, gabapentin, and tizanidine without pain relief. She states she has been having shortness of breath since she was diagnosed with pneumonia. Earlier today she reports a temperature of 100.8F at home. She denies cough.     She was recently admitted to [facility] for sickle cell pain crisis from 2/17 - 2/22, and also from 2/2 -2/6. During her most recent admission she required a PCA pump for pain management. She reports she was discharged with Rx for oxycodone, MS contin, and Lovenox. She states she did not fill her Lovenox as she was unable to afford it, but also states she has only been off of her Lovenox x 2 days. Reports when brother gets pay check he will be able to pay for it. Also reported that unable to obtain MS contin from multiple pharmacies due to shortage.      In the ED today, labs show H&H of 10.0/39.6 without leukocytosis or left shift. A metabolic panel shows no acute findings. Reticulocyte count is not elevated. A CXR showed right sided chest port in stable position without further acute cardiopulmonary findings. She had an US of the b/l LE veins that was negative for DVT. She denied shortness of breath worsened from baseline, chest pain, or any other symptom concerning for PE. She was treated in the ED with pain meds. She was placed on observation.         Hospital Course:   Reports right leg pain and left hip pain are very severe. Treat sickle cell crisis. Wean as able. Due to hospital shortage, dilaudid changed to morphine. Continues to have symptoms of URTI but feels it is improving. RIP neg. Seen by psych for depression and life stressors. Denies SI. Rec increase fluoxetine to 80 mg daily for mood and start prazosin 1 mg qhs for trauma nightmares  Wean as able. 2/28 - FLOR. Kidney US and urine studies and CK (given hx of rhabdo).  CK not elevated. FLOR improved. Pain improved and stable for discharge. MC contin filled from our pharmacy prior to discharge.      Goals of Care Treatment Preferences:  Code Status: Full Code           Consults:   Consults (From admission, onward)          Status Ordering Provider     Inpatient consult to Telemedicine - Psych  Once        Provider:  (Not yet assigned)    Completed SHAMIR RAO              Final Active Diagnoses:    Diagnosis Date Noted POA    PRINCIPAL PROBLEM:  Sickle cell disease, type S beta-zero thalassemia with crisis [D57.439] 04/26/2019 Yes    Episode of recurrent major depressive disorder [F33.9] 02/27/2025 Unknown    Trauma and stressor-related disorder [F43.9] 02/27/2025 Unknown    Upper respiratory infection [J06.9] 02/17/2025 Yes    Chronic gastritis [K29.50] 08/22/2024 Yes     Chronic    FLOR (acute kidney injury) [N17.9] 07/02/2024 Yes    History of pulmonary embolism [Z86.711] 06/08/2020 Yes      Chronic    Intermittent asthma [J45.20] 04/26/2019 Yes     Chronic    Anxiety and depression [F41.9, F32.A] 03/20/2018 Yes     Chronic    Hypertension [I10] 04/16/2017 Yes     Chronic    Iron deficiency anemia [D50.9] 02/21/2017 Yes      Problems Resolved During this Admission:       Discharged Condition: good    Disposition: Home or Self Care    Follow Up:   Follow-up Information       [facility]. Schedule an appointment as soon as possible for a visit in 1 week(s).    Why: Establish Care with PCP  Contact information:  [address]  [address]  [place] [facility] [address]                           Medications:  Reconciled Home Medications:      Medication List        START taking these medications      prazosin 1 MG Cap  Commonly known as: MINIPRESS  Take 1 capsule (1 mg total) by mouth every evening.  Notes to patient: For high blood pressure            CHANGE how you take these medications      FLUoxetine 40 MG capsule  Take 2 capsules (80 mg total) by mouth once daily.  What changed: how much to take  Notes to patient: NEW DOSE 80 MG. Old dose was 40 mg     HYDROcodone-acetaminophen  mg per tablet  Commonly known as: NORCO  Take 1 tablet by mouth every 4 (four) hours as needed for Pain.  What changed: when to take this  Notes to patient: NO CHANGE     morphine 30 MG 12 hr tablet  Commonly known as: MS CONTIN  Take 1 tablet (30 mg total) by mouth 3 (three) times daily.  What changed: when to take this  Notes to patient: NO CHANGE            CONTINUE taking these medications      acetaminophen 325 MG tablet  Commonly known as: TYLENOL  Take 2 tablets (650 mg total) by mouth every 8 (eight) hours as needed for Pain.     albuterol 90 mcg/actuation inhaler  Commonly known as: VENTOLIN HFA  Inhale 2 puffs into the lungs every 6 (six) hours as needed for Wheezing or Shortness of Breath. Rescue     amLODIPine 10 MG tablet  Commonly known as: NORVASC  Take 1 tablet (10 mg total)  by mouth once daily.     dicyclomine 10 MG capsule  Commonly known as: BENTYL  Take 1 capsule (10 mg total) by mouth 4 (four) times daily before meals and nightly.     enoxaparin 40 mg/0.4 mL Syrg  Commonly known as: LOVENOX  Inject 0.4 mLs (40 mg total) into the skin every 12 (twelve) hours.     fluticasone propionate 50 mcg/actuation nasal spray  Commonly known as: FLONASE  INSTILL 1 SPRAY INTO EACH NOSTRIL EVERY DAY     folic acid 1 MG tablet  Commonly known as: FOLVITE  Take 1 tablet (1 mg total) by mouth once daily.     gabapentin 300 MG capsule  Commonly known as: NEURONTIN  Take 2 capsules (600 mg total) by mouth 3 (three) times daily.     guaiFENesin 600 mg 12 hr tablet  Commonly known as: MUCINEX  Take 1 tablet (600 mg total) by mouth 2 (two) times daily. for 10 days     hydroxyurea 500 mg Cap  Commonly known as: HYDREA  Take 2 capsules (1,000 mg total) by mouth once daily.     metoclopramide HCl 5 MG tablet  Commonly known as: REGLAN  Take 5 mg by mouth 4 (four) times daily.     naloxone 4 mg/actuation Spry  Commonly known as: NARCAN  4mg by nasal route as needed for opioid overdose; may repeat every 2-3 minutes in alternating nostrils until medical help arrives. Call 911     pantoprazole 40 MG tablet  Commonly known as: PROTONIX  Take 1 tablet (40 mg total) by mouth 2 (two) times daily.     senna-docusate 8.6-50 mg 8.6-50 mg per tablet  Commonly known as: PERICOLACE  Take 1 tablet by mouth daily as needed for Constipation.     sucralfate 1 gram tablet  Commonly known as: CARAFATE  Take 1 tablet (1 g total) by mouth 4 (four) times daily before meals and nightly.     tiZANidine 4 MG tablet  Commonly known as: ZANAFLEX  Take 1 tablet (4 mg total) by mouth every 8 (eight) hours. for 10 days     VITAMIN C ORAL  Take 1 capsule by mouth 2 (two) times a day.              Indwelling Lines/Drains at time of discharge:   Lines/Drains/Airways       Central Venous Catheter Line  Duration             Port A Cath Single  Lumen 02/10/25 1301 Internal Jugular Right 19 days                    Time spent on the discharge of patient: 35 minutes         Sherine Foreman MD  Department of Hospital Medicine  [facility] - Med Surg ([place])

## 2025-03-02 NOTE — PLAN OF CARE
Case Management Final Discharge Note    Discharge Disposition: Home    New DME ordered / company name: None    Relevant SDOH / Transition of Care Barriers:  None    Person available to provide assistance at home when needed and their contact information: Self and daughter/Timoteo Malone at [phone]    Scheduled followup appointment: Pt advised to establish care with [facility] on [facility]    Referrals placed: None    Transportation: Pt will transport self to home when discharged.        Patient and family educated on discharge services and updated on DC plan. Bedside RN notified, patient clear to discharge from Case Management Perspective.      03/02/25 0838   Final Note   Assessment Type Final Discharge Note   Anticipated Discharge Disposition Home   What phone number can be called within the next 1-3 days to see how you are doing after discharge? [phone]   Hospital Resources/Appts/Education Provided Provided patient/caregiver with written discharge plan information   Post-Acute Status   Discharge Delays None known at this time     [facility] ([place])  Discharge Final Note    Primary Care Provider: No, Primary Doctor    Expected Discharge Date: 3/2/2025    Final Discharge Note (most recent)       Final Note - 03/02/25 0838          Final Note    Assessment Type Final Discharge Note (P)      Anticipated Discharge Disposition Home or Self Care (P)      What phone number can be called within the next 1-3 days to see how you are doing after discharge? [phone] (P)      Hospital Resources/Appts/Education Provided Provided patient/caregiver with written discharge plan information (P)         Post-Acute Status    Discharge Delays None known at this time (P)                      Important Message from Medicare             Contact Info       [address] [address]       Next Steps: Schedule an appointment as soon as possible for  a visit in 1 week(s)    Instructions: Establish Care with PCP

## 2025-03-02 NOTE — NURSING
Port de accessed and flushed with heparin, patient received prescriptions, patient in transport and will be driving herself home.

## 2025-03-11 ENCOUNTER — HOSPITAL ENCOUNTER (INPATIENT)
Facility: OTHER | Age: 45
LOS: 3 days | Discharge: HOME | DRG: 812 | End: 2025-03-14
Attending: EMERGENCY MEDICINE | Admitting: HOSPITALIST
Payer: MEDICARE

## 2025-03-11 DIAGNOSIS — R10.13 EPIGASTRIC PAIN: ICD-10-CM

## 2025-03-11 DIAGNOSIS — R07.9 CHEST PAIN: ICD-10-CM

## 2025-03-11 DIAGNOSIS — M25.552 BILATERAL HIP PAIN: ICD-10-CM

## 2025-03-11 DIAGNOSIS — M25.551 BILATERAL HIP PAIN: ICD-10-CM

## 2025-03-11 DIAGNOSIS — D57.00 SICKLE CELL PAIN CRISIS: Primary | ICD-10-CM

## 2025-03-11 LAB
ALBUMIN SERPL BCP-MCNC: 3.8 G/DL (ref 3.5–5.2)
ALP SERPL-CCNC: 97 U/L (ref 40–150)
ALT SERPL W/O P-5'-P-CCNC: 16 U/L (ref 10–44)
ANION GAP SERPL CALC-SCNC: 8 MMOL/L (ref 8–16)
AST SERPL-CCNC: 22 U/L (ref 10–40)
BILIRUB SERPL-MCNC: 0.8 MG/DL (ref 0.1–1)
BILIRUB UR QL STRIP: NEGATIVE
BUN SERPL-MCNC: 14 MG/DL (ref 6–20)
CALCIUM SERPL-MCNC: 9.7 MG/DL (ref 8.7–10.5)
CHLORIDE SERPL-SCNC: 106 MMOL/L (ref 95–110)
CLARITY UR: CLEAR
CO2 SERPL-SCNC: 25 MMOL/L (ref 23–29)
COLOR UR: YELLOW
CREAT SERPL-MCNC: 1 MG/DL (ref 0.5–1.4)
ERYTHROCYTE [DISTWIDTH] IN BLOOD BY AUTOMATED COUNT: 23.4 % (ref 11.5–14.5)
EST. GFR  (NO RACE VARIABLE): >60 ML/MIN/1.73 M^2
GLUCOSE SERPL-MCNC: 104 MG/DL (ref 70–110)
GLUCOSE UR QL STRIP: NEGATIVE
HCT VFR BLD AUTO: 28.8 % (ref 37–48.5)
HGB BLD-MCNC: 9.8 G/DL (ref 12–16)
HGB UR QL STRIP: NEGATIVE
KETONES UR QL STRIP: NEGATIVE
LEUKOCYTE ESTERASE UR QL STRIP: NEGATIVE
MCH RBC QN AUTO: 23.6 PG (ref 27–31)
MCHC RBC AUTO-ENTMCNC: 34 G/DL (ref 32–36)
MCV RBC AUTO: 69 FL (ref 82–98)
NITRITE UR QL STRIP: NEGATIVE
PH UR STRIP: 6 [PH] (ref 5–8)
PLATELET # BLD AUTO: 295 K/UL (ref 150–450)
PMV BLD AUTO: 9.6 FL (ref 9.2–12.9)
POTASSIUM SERPL-SCNC: 3.5 MMOL/L (ref 3.5–5.1)
PROT SERPL-MCNC: 8.3 G/DL (ref 6–8.4)
PROT UR QL STRIP: NEGATIVE
RBC # BLD AUTO: 4.16 M/UL (ref 4–5.4)
RETICS/RBC NFR AUTO: 2.1 % (ref 0.5–2.5)
SODIUM SERPL-SCNC: 139 MMOL/L (ref 136–145)
SP GR UR STRIP: 1.01 (ref 1–1.03)
URN SPEC COLLECT METH UR: NORMAL
UROBILINOGEN UR STRIP-ACNC: NEGATIVE EU/DL
WBC # BLD AUTO: 9.56 K/UL (ref 3.9–12.7)

## 2025-03-11 PROCEDURE — 21400001 HC TELEMETRY ROOM

## 2025-03-11 PROCEDURE — 25000003 PHARM REV CODE 250: Performed by: EMERGENCY MEDICINE

## 2025-03-11 PROCEDURE — 93005 ELECTROCARDIOGRAM TRACING: CPT

## 2025-03-11 PROCEDURE — 11000001 HC ACUTE MED/SURG PRIVATE ROOM

## 2025-03-11 PROCEDURE — 25000003 PHARM REV CODE 250: Performed by: HOSPITALIST

## 2025-03-11 PROCEDURE — 63600175 PHARM REV CODE 636 W HCPCS: Performed by: EMERGENCY MEDICINE

## 2025-03-11 PROCEDURE — 80053 COMPREHEN METABOLIC PANEL: CPT | Performed by: EMERGENCY MEDICINE

## 2025-03-11 PROCEDURE — 96375 TX/PRO/DX INJ NEW DRUG ADDON: CPT

## 2025-03-11 PROCEDURE — A4216 STERILE WATER/SALINE, 10 ML: HCPCS | Performed by: HOSPITALIST

## 2025-03-11 PROCEDURE — 96376 TX/PRO/DX INJ SAME DRUG ADON: CPT

## 2025-03-11 PROCEDURE — 85027 COMPLETE CBC AUTOMATED: CPT | Performed by: EMERGENCY MEDICINE

## 2025-03-11 PROCEDURE — 93010 ELECTROCARDIOGRAM REPORT: CPT | Mod: ,,, | Performed by: INTERNAL MEDICINE

## 2025-03-11 PROCEDURE — 63600175 PHARM REV CODE 636 W HCPCS: Performed by: HOSPITALIST

## 2025-03-11 PROCEDURE — 96361 HYDRATE IV INFUSION ADD-ON: CPT

## 2025-03-11 PROCEDURE — 81003 URINALYSIS AUTO W/O SCOPE: CPT | Performed by: HOSPITALIST

## 2025-03-11 PROCEDURE — 96374 THER/PROPH/DIAG INJ IV PUSH: CPT

## 2025-03-11 PROCEDURE — 85045 AUTOMATED RETICULOCYTE COUNT: CPT | Performed by: EMERGENCY MEDICINE

## 2025-03-11 PROCEDURE — 99285 EMERGENCY DEPT VISIT HI MDM: CPT | Mod: 25

## 2025-03-11 RX ORDER — AMLODIPINE BESYLATE 5 MG/1
10 TABLET ORAL DAILY
Status: DISCONTINUED | OUTPATIENT
Start: 2025-03-11 | End: 2025-03-14 | Stop reason: HOSPADM

## 2025-03-11 RX ORDER — PRAZOSIN HYDROCHLORIDE 1 MG/1
1 CAPSULE ORAL NIGHTLY
Status: DISCONTINUED | OUTPATIENT
Start: 2025-03-11 | End: 2025-03-14 | Stop reason: HOSPADM

## 2025-03-11 RX ORDER — MORPHINE SULFATE 2 MG/ML
12 INJECTION, SOLUTION INTRAMUSCULAR; INTRAVENOUS
Status: COMPLETED | OUTPATIENT
Start: 2025-03-11 | End: 2025-03-11

## 2025-03-11 RX ORDER — DICYCLOMINE HYDROCHLORIDE 10 MG/1
10 CAPSULE ORAL
Status: DISCONTINUED | OUTPATIENT
Start: 2025-03-11 | End: 2025-03-14 | Stop reason: HOSPADM

## 2025-03-11 RX ORDER — ALUMINUM HYDROXIDE, MAGNESIUM HYDROXIDE, AND SIMETHICONE 1200; 120; 1200 MG/30ML; MG/30ML; MG/30ML
5 SUSPENSION ORAL
Status: COMPLETED | OUTPATIENT
Start: 2025-03-11 | End: 2025-03-11

## 2025-03-11 RX ORDER — SUCRALFATE 1 G/1
1 TABLET ORAL
Status: DISCONTINUED | OUTPATIENT
Start: 2025-03-11 | End: 2025-03-14 | Stop reason: HOSPADM

## 2025-03-11 RX ORDER — GLUCAGON 1 MG
1 KIT INJECTION
Status: DISCONTINUED | OUTPATIENT
Start: 2025-03-11 | End: 2025-03-14 | Stop reason: HOSPADM

## 2025-03-11 RX ORDER — ACETAMINOPHEN 325 MG/1
650 TABLET ORAL EVERY 8 HOURS PRN
Status: DISCONTINUED | OUTPATIENT
Start: 2025-03-11 | End: 2025-03-14 | Stop reason: HOSPADM

## 2025-03-11 RX ORDER — GABAPENTIN 300 MG/1
600 CAPSULE ORAL 3 TIMES DAILY
Status: DISCONTINUED | OUTPATIENT
Start: 2025-03-11 | End: 2025-03-14 | Stop reason: HOSPADM

## 2025-03-11 RX ORDER — ONDANSETRON HYDROCHLORIDE 2 MG/ML
4 INJECTION, SOLUTION INTRAVENOUS
Status: COMPLETED | OUTPATIENT
Start: 2025-03-11 | End: 2025-03-11

## 2025-03-11 RX ORDER — TALC
6 POWDER (GRAM) TOPICAL NIGHTLY PRN
Status: DISCONTINUED | OUTPATIENT
Start: 2025-03-11 | End: 2025-03-14 | Stop reason: HOSPADM

## 2025-03-11 RX ORDER — DIPHENHYDRAMINE HYDROCHLORIDE 50 MG/ML
25 INJECTION, SOLUTION INTRAMUSCULAR; INTRAVENOUS EVERY 4 HOURS PRN
Status: DISCONTINUED | OUTPATIENT
Start: 2025-03-11 | End: 2025-03-11

## 2025-03-11 RX ORDER — DIPHENHYDRAMINE HCL 25 MG
50 CAPSULE ORAL
Status: COMPLETED | OUTPATIENT
Start: 2025-03-11 | End: 2025-03-11

## 2025-03-11 RX ORDER — BISACODYL 10 MG/1
10 SUPPOSITORY RECTAL DAILY PRN
Status: DISCONTINUED | OUTPATIENT
Start: 2025-03-11 | End: 2025-03-14 | Stop reason: HOSPADM

## 2025-03-11 RX ORDER — TIZANIDINE 2 MG/1
4 TABLET ORAL EVERY 8 HOURS
Status: DISCONTINUED | OUTPATIENT
Start: 2025-03-11 | End: 2025-03-14 | Stop reason: HOSPADM

## 2025-03-11 RX ORDER — ACETAMINOPHEN 500 MG
1000 TABLET ORAL
Status: COMPLETED | OUTPATIENT
Start: 2025-03-11 | End: 2025-03-11

## 2025-03-11 RX ORDER — HYDROXYUREA 500 MG/1
1000 CAPSULE ORAL DAILY
Status: DISCONTINUED | OUTPATIENT
Start: 2025-03-11 | End: 2025-03-14 | Stop reason: HOSPADM

## 2025-03-11 RX ORDER — METOCLOPRAMIDE 5 MG/1
5 TABLET ORAL
Status: DISCONTINUED | OUTPATIENT
Start: 2025-03-11 | End: 2025-03-14 | Stop reason: HOSPADM

## 2025-03-11 RX ORDER — SODIUM CHLORIDE 0.9 % (FLUSH) 0.9 %
10 SYRINGE (ML) INJECTION EVERY 8 HOURS
Status: DISCONTINUED | OUTPATIENT
Start: 2025-03-11 | End: 2025-03-14 | Stop reason: HOSPADM

## 2025-03-11 RX ORDER — FOLIC ACID 1 MG/1
1 TABLET ORAL DAILY
Status: DISCONTINUED | OUTPATIENT
Start: 2025-03-11 | End: 2025-03-14 | Stop reason: HOSPADM

## 2025-03-11 RX ORDER — ALUMINUM HYDROXIDE, MAGNESIUM HYDROXIDE, AND SIMETHICONE 1200; 120; 1200 MG/30ML; MG/30ML; MG/30ML
30 SUSPENSION ORAL
Status: DISCONTINUED | OUTPATIENT
Start: 2025-03-11 | End: 2025-03-14 | Stop reason: HOSPADM

## 2025-03-11 RX ORDER — MUPIROCIN 20 MG/G
OINTMENT TOPICAL 2 TIMES DAILY
Status: DISCONTINUED | OUTPATIENT
Start: 2025-03-11 | End: 2025-03-14 | Stop reason: HOSPADM

## 2025-03-11 RX ORDER — ENOXAPARIN SODIUM 100 MG/ML
40 INJECTION SUBCUTANEOUS EVERY 24 HOURS
Status: DISCONTINUED | OUTPATIENT
Start: 2025-03-11 | End: 2025-03-11

## 2025-03-11 RX ORDER — DICYCLOMINE HYDROCHLORIDE 10 MG/1
20 CAPSULE ORAL
Status: COMPLETED | OUTPATIENT
Start: 2025-03-11 | End: 2025-03-11

## 2025-03-11 RX ORDER — ONDANSETRON HYDROCHLORIDE 2 MG/ML
4 INJECTION, SOLUTION INTRAVENOUS EVERY 8 HOURS PRN
Status: DISCONTINUED | OUTPATIENT
Start: 2025-03-11 | End: 2025-03-14 | Stop reason: HOSPADM

## 2025-03-11 RX ORDER — NALOXONE HCL 0.4 MG/ML
0.02 VIAL (ML) INJECTION
Status: DISCONTINUED | OUTPATIENT
Start: 2025-03-11 | End: 2025-03-14 | Stop reason: HOSPADM

## 2025-03-11 RX ORDER — INSULIN ASPART 100 [IU]/ML
0-5 INJECTION, SOLUTION INTRAVENOUS; SUBCUTANEOUS
Status: DISCONTINUED | OUTPATIENT
Start: 2025-03-11 | End: 2025-03-14 | Stop reason: HOSPADM

## 2025-03-11 RX ORDER — DIPHENHYDRAMINE HYDROCHLORIDE 50 MG/ML
25 INJECTION, SOLUTION INTRAMUSCULAR; INTRAVENOUS
Status: DISCONTINUED | OUTPATIENT
Start: 2025-03-11 | End: 2025-03-12

## 2025-03-11 RX ORDER — LIDOCAINE HYDROCHLORIDE 20 MG/ML
5 SOLUTION OROPHARYNGEAL
Status: COMPLETED | OUTPATIENT
Start: 2025-03-11 | End: 2025-03-11

## 2025-03-11 RX ORDER — IBUPROFEN 200 MG
24 TABLET ORAL
Status: DISCONTINUED | OUTPATIENT
Start: 2025-03-11 | End: 2025-03-14 | Stop reason: HOSPADM

## 2025-03-11 RX ORDER — POLYETHYLENE GLYCOL 3350 17 G/17G
17 POWDER, FOR SOLUTION ORAL DAILY
Status: DISCONTINUED | OUTPATIENT
Start: 2025-03-11 | End: 2025-03-14 | Stop reason: HOSPADM

## 2025-03-11 RX ORDER — SUCRALFATE 1 G/10ML
1 SUSPENSION ORAL EVERY 6 HOURS
Status: DISCONTINUED | OUTPATIENT
Start: 2025-03-11 | End: 2025-03-11

## 2025-03-11 RX ORDER — MORPHINE SULFATE 15 MG/1
30 TABLET, FILM COATED, EXTENDED RELEASE ORAL 3 TIMES DAILY
Refills: 0 | Status: DISCONTINUED | OUTPATIENT
Start: 2025-03-11 | End: 2025-03-14 | Stop reason: HOSPADM

## 2025-03-11 RX ORDER — MORPHINE SULFATE 4 MG/ML
8 INJECTION, SOLUTION INTRAMUSCULAR; INTRAVENOUS
Status: DISCONTINUED | OUTPATIENT
Start: 2025-03-11 | End: 2025-03-14 | Stop reason: HOSPADM

## 2025-03-11 RX ORDER — FLUOXETINE HYDROCHLORIDE 20 MG/1
80 CAPSULE ORAL DAILY
Status: DISCONTINUED | OUTPATIENT
Start: 2025-03-11 | End: 2025-03-14 | Stop reason: HOSPADM

## 2025-03-11 RX ORDER — MORPHINE SULFATE 4 MG/ML
8 INJECTION, SOLUTION INTRAMUSCULAR; INTRAVENOUS EVERY 4 HOURS PRN
Status: DISCONTINUED | OUTPATIENT
Start: 2025-03-11 | End: 2025-03-11

## 2025-03-11 RX ORDER — HYDROCODONE BITARTRATE AND ACETAMINOPHEN 10; 325 MG/1; MG/1
1 TABLET ORAL EVERY 4 HOURS PRN
Refills: 0 | Status: DISCONTINUED | OUTPATIENT
Start: 2025-03-11 | End: 2025-03-14 | Stop reason: HOSPADM

## 2025-03-11 RX ORDER — MORPHINE SULFATE 4 MG/ML
8 INJECTION, SOLUTION INTRAMUSCULAR; INTRAVENOUS
Refills: 0 | Status: COMPLETED | OUTPATIENT
Start: 2025-03-11 | End: 2025-03-11

## 2025-03-11 RX ORDER — IBUPROFEN 200 MG
16 TABLET ORAL
Status: DISCONTINUED | OUTPATIENT
Start: 2025-03-11 | End: 2025-03-14 | Stop reason: HOSPADM

## 2025-03-11 RX ORDER — SODIUM CHLORIDE, SODIUM LACTATE, POTASSIUM CHLORIDE, CALCIUM CHLORIDE 600; 310; 30; 20 MG/100ML; MG/100ML; MG/100ML; MG/100ML
INJECTION, SOLUTION INTRAVENOUS CONTINUOUS
Status: DISCONTINUED | OUTPATIENT
Start: 2025-03-11 | End: 2025-03-14 | Stop reason: HOSPADM

## 2025-03-11 RX ORDER — PANTOPRAZOLE SODIUM 40 MG/1
40 TABLET, DELAYED RELEASE ORAL 2 TIMES DAILY
Status: DISCONTINUED | OUTPATIENT
Start: 2025-03-11 | End: 2025-03-14 | Stop reason: HOSPADM

## 2025-03-11 RX ORDER — FLUTICASONE PROPIONATE 50 MCG
1 SPRAY, SUSPENSION (ML) NASAL DAILY
Status: DISCONTINUED | OUTPATIENT
Start: 2025-03-12 | End: 2025-03-14 | Stop reason: HOSPADM

## 2025-03-11 RX ORDER — ACETAMINOPHEN 500 MG
1000 TABLET ORAL EVERY 8 HOURS
Status: DISCONTINUED | OUTPATIENT
Start: 2025-03-11 | End: 2025-03-14 | Stop reason: HOSPADM

## 2025-03-11 RX ORDER — AMOXICILLIN 250 MG
1 CAPSULE ORAL DAILY PRN
Status: DISCONTINUED | OUTPATIENT
Start: 2025-03-11 | End: 2025-03-14 | Stop reason: HOSPADM

## 2025-03-11 RX ADMIN — GABAPENTIN 600 MG: 300 CAPSULE ORAL at 02:03

## 2025-03-11 RX ADMIN — ACETAMINOPHEN 1000 MG: 500 TABLET ORAL at 01:03

## 2025-03-11 RX ADMIN — MORPHINE SULFATE 30 MG: 15 TABLET, FILM COATED, EXTENDED RELEASE ORAL at 02:03

## 2025-03-11 RX ADMIN — LACTULOSE 20 G: 20 SOLUTION ORAL at 10:03

## 2025-03-11 RX ADMIN — ACETAMINOPHEN 1000 MG: 500 TABLET ORAL at 10:03

## 2025-03-11 RX ADMIN — DICYCLOMINE HYDROCHLORIDE 20 MG: 10 CAPSULE ORAL at 09:03

## 2025-03-11 RX ADMIN — DICYCLOMINE HYDROCHLORIDE 10 MG: 10 CAPSULE ORAL at 05:03

## 2025-03-11 RX ADMIN — MORPHINE SULFATE 12 MG: 2 INJECTION, SOLUTION INTRAMUSCULAR; INTRAVENOUS at 10:03

## 2025-03-11 RX ADMIN — PRAZOSIN HYDROCHLORIDE 1 MG: 1 CAPSULE ORAL at 09:03

## 2025-03-11 RX ADMIN — ONDANSETRON 4 MG: 2 INJECTION INTRAMUSCULAR; INTRAVENOUS at 10:03

## 2025-03-11 RX ADMIN — POLYETHYLENE GLYCOL 3350 17 G: 17 POWDER, FOR SOLUTION ORAL at 01:03

## 2025-03-11 RX ADMIN — ALUMINUM HYDROXIDE, MAGNESIUM HYDROXIDE, AND DIMETHICONE 5 ML: 200; 20; 200 SUSPENSION ORAL at 09:03

## 2025-03-11 RX ADMIN — SODIUM CHLORIDE, POTASSIUM CHLORIDE, SODIUM LACTATE AND CALCIUM CHLORIDE: 600; 310; 30; 20 INJECTION, SOLUTION INTRAVENOUS at 03:03

## 2025-03-11 RX ADMIN — GABAPENTIN 600 MG: 300 CAPSULE ORAL at 10:03

## 2025-03-11 RX ADMIN — ALUMINUM HYDROXIDE, MAGNESIUM HYDROXIDE, AND DIMETHICONE 30 ML: 200; 20; 200 SUSPENSION ORAL at 10:03

## 2025-03-11 RX ADMIN — METOCLOPRAMIDE 5 MG: 5 TABLET ORAL at 05:03

## 2025-03-11 RX ADMIN — DIPHENHYDRAMINE HYDROCHLORIDE 25 MG: 50 INJECTION INTRAMUSCULAR; INTRAVENOUS at 09:03

## 2025-03-11 RX ADMIN — SUCRALFATE 1 G: 1 TABLET ORAL at 05:03

## 2025-03-11 RX ADMIN — HYDROXYUREA 1000 MG: 500 CAPSULE ORAL at 02:03

## 2025-03-11 RX ADMIN — FOLIC ACID 1 MG: 1 TABLET ORAL at 01:03

## 2025-03-11 RX ADMIN — DICYCLOMINE HYDROCHLORIDE 10 MG: 10 CAPSULE ORAL at 10:03

## 2025-03-11 RX ADMIN — ALUMINUM HYDROXIDE, MAGNESIUM HYDROXIDE, AND DIMETHICONE 30 ML: 200; 20; 200 SUSPENSION ORAL at 05:03

## 2025-03-11 RX ADMIN — Medication 10 ML: at 10:03

## 2025-03-11 RX ADMIN — DIPHENHYDRAMINE HYDROCHLORIDE 25 MG: 50 INJECTION, SOLUTION INTRAMUSCULAR; INTRAVENOUS at 05:03

## 2025-03-11 RX ADMIN — SODIUM CHLORIDE 1000 ML: 0.9 INJECTION, SOLUTION INTRAVENOUS at 09:03

## 2025-03-11 RX ADMIN — ENOXAPARIN SODIUM 40 MG: 40 INJECTION SUBCUTANEOUS at 05:03

## 2025-03-11 RX ADMIN — MORPHINE SULFATE 8 MG: 4 INJECTION INTRAVENOUS at 09:03

## 2025-03-11 RX ADMIN — LIDOCAINE HYDROCHLORIDE 5 ML: 20 SOLUTION ORAL at 09:03

## 2025-03-11 RX ADMIN — Medication 10 ML: at 01:03

## 2025-03-11 RX ADMIN — FLUOXETINE HYDROCHLORIDE 80 MG: 20 CAPSULE ORAL at 01:03

## 2025-03-11 RX ADMIN — TIZANIDINE 4 MG: 2 TABLET ORAL at 10:03

## 2025-03-11 RX ADMIN — MORPHINE SULFATE 8 MG: 4 INJECTION INTRAVENOUS at 05:03

## 2025-03-11 RX ADMIN — ACETAMINOPHEN 1000 MG: 500 TABLET, FILM COATED ORAL at 09:03

## 2025-03-11 RX ADMIN — APIXABAN 5 MG: 2.5 TABLET, FILM COATED ORAL at 10:03

## 2025-03-11 RX ADMIN — MUPIROCIN: 20 OINTMENT TOPICAL at 10:03

## 2025-03-11 RX ADMIN — AMLODIPINE BESYLATE 10 MG: 5 TABLET ORAL at 01:03

## 2025-03-11 RX ADMIN — TIZANIDINE 4 MG: 2 TABLET ORAL at 01:03

## 2025-03-11 RX ADMIN — PANTOPRAZOLE SODIUM 40 MG: 40 TABLET, DELAYED RELEASE ORAL at 10:03

## 2025-03-11 RX ADMIN — SUCRALFATE 1 G: 1 TABLET ORAL at 10:03

## 2025-03-11 RX ADMIN — DIPHENHYDRAMINE HYDROCHLORIDE 50 MG: 25 CAPSULE ORAL at 10:03

## 2025-03-11 RX ADMIN — MORPHINE SULFATE 30 MG: 15 TABLET, FILM COATED, EXTENDED RELEASE ORAL at 10:03

## 2025-03-11 RX ADMIN — ONDANSETRON 4 MG: 2 INJECTION INTRAMUSCULAR; INTRAVENOUS at 09:03

## 2025-03-11 NOTE — ASSESSMENT & PLAN NOTE
Chronic opioid dependence  Chronic gastritis  - Patient presented with typical sickle cell pain crisis with recent hospitalization for same presentation  - Treat with IV fluids  - Continue chronic opioid therapy MS Contin 30 mg p.o. t.i.d., hydrocodone-acetaminophen  mg p.o. q.4 hours p.r.n. for moderate pain, nand have morphine 8 mg IV q.3 hours p.r.n. for severe pain  - Supportive care with antiemetics, diphenhydramine IV p.r.n.  - Continue home regimen for gastritis with Protonix 40 mg p.o. b.i.d., Carafate 1 g p.o. q.6 hours, Reglan 5 mg p.o. q.a.c.  - Continue folic acid 1 mg p.o. daily and hydroxyurea 1000 mg p.o. daily  - Trend with labs and correct electrolyte abnormalities as needed

## 2025-03-11 NOTE — H&P
[facility] Medicine  History & Physical    Patient Name: Nazanin Malone  MRN: [id number]  Patient Class: IP- Inpatient  Admission Date: 3/11/2025  Attending Physician: Terrance Lester MD   Primary Care Provider: Kezia Primary Doctor         Patient information was obtained from patient, past medical records, and ER records.     Subjective:     Principal Problem:Sickle cell disease, type S beta-zero thalassemia with crisis    Chief Complaint:   Chief Complaint   Patient presents with    Sickle Cell Pain Crisis        HPI: 44 y.o. 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 medications.  Patient reports when her pain flared up, she started to have nausea and vomiting and heartburn that cause her abdominal pain.  This is consistent with what happens when her pain crisis flare up which causes her chronic gastritis get worse despite being on Protonix and sucralfate.  Also reported nausea and vomiting, but no shortness of breath or chest pain.  No fevers or chills.  Pain typical of her pain crisis in the past, which have become more frequent.  Recently hospitalized at this facility from 2/26 - 3/2/2025 with same presentation..  In the ER, IV fluids and multiple IV pushes of pain medications were given without relief.  Workup remarkable for hemoglobin 9.8, normal retic count of 2.1.  Chest x-ray and bilateral hip films were unremarkable for any acute abnormalities.    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/2017    Asthma     Avascular necrosis of femur     Depression     History of pulmonary embolism     Hypertension     Morbid obesity     Opioid dependence 04/16/2017    Pneumonia due to Streptococcus  pneumoniae 2017    Right lower lobe pneumonia 2017    Sepsis due to Streptococcus pneumoniae 2017    Sickle cell-beta thalassemia disease with pain     Trigeminal neuralgia        Past Surgical History:   Procedure Laterality Date     SECTION      ESOPHAGOGASTRODUODENOSCOPY N/A 2024    Procedure: EGD (ESOPHAGOGASTRODUODENOSCOPY);  Surgeon: Allen Marshall MD;  Location: [address]);  Service: Gastroenterology;  Laterality: N/A;    TONSILLECTOMY      TUBAL LIGATION         Review of patient's allergies indicates:   Allergen Reactions    Cat dander Anaphylaxis, Itching and Shortness Of Breath    Nsaids (non-steroidal anti-inflammatory drug) Itching and Anaphylaxis    Latex Rash       No current facility-administered medications on file prior to encounter.     Current Outpatient Medications on File Prior to Encounter   Medication Sig    acetaminophen (TYLENOL) 325 MG tablet Take 2 tablets (650 mg total) by mouth every 8 (eight) hours as needed for Pain.    albuterol (VENTOLIN HFA) 90 mcg/actuation inhaler Inhale 2 puffs into the lungs every 6 (six) hours as needed for Wheezing or Shortness of Breath. Rescue    amLODIPine (NORVASC) 10 MG tablet Take 1 tablet (10 mg total) by mouth once daily.    ascorbic acid (VITAMIN C ORAL) Take 1 capsule by mouth 2 (two) times a day.    dicyclomine (BENTYL) 10 MG capsule Take 1 capsule (10 mg total) by mouth 4 (four) times daily before meals and nightly.    enoxaparin (LOVENOX) 40 mg/0.4 mL Syrg Inject 0.4 mLs (40 mg total) into the skin every 12 (twelve) hours.    FLUoxetine 40 MG capsule Take 2 capsules (80 mg total) by mouth once daily.    fluticasone propionate (FLONASE) 50 mcg/actuation nasal spray INSTILL 1 SPRAY INTO EACH NOSTRIL EVERY DAY    folic acid (FOLVITE) 1 MG tablet Take 1 tablet (1 mg total) by mouth once daily.    gabapentin (NEURONTIN) 300 MG capsule Take 2 capsules (600 mg total) by mouth 3 (three) times daily. (Patient taking  differently: Take 2 capsules by mouth 3 (three) times daily.)    HYDROcodone-acetaminophen (NORCO)  mg per tablet Take 1 tablet by mouth every 4 (four) hours as needed for Pain. (Patient taking differently: Take 1 tablet by mouth every 4 to 6 hours as needed for Pain.)    hydroxyurea (HYDREA) 500 mg Cap Take 2 capsules (1,000 mg total) by mouth once daily.    metoclopramide HCl (REGLAN) 5 MG tablet Take 5 mg by mouth 4 (four) times daily.    morphine (MS CONTIN) 30 MG 12 hr tablet Take 1 tablet (30 mg total) by mouth 3 (three) times daily.    naloxone (NARCAN) 4 mg/actuation Spry 4mg by nasal route as needed for opioid overdose; may repeat every 2-3 minutes in alternating nostrils until medical help arrives. Call 911    pantoprazole (PROTONIX) 40 MG tablet Take 1 tablet (40 mg total) by mouth 2 (two) times daily.    prazosin (MINIPRESS) 1 MG Cap Take 1 capsule (1 mg total) by mouth every evening.    senna-docusate 8.6-50 mg (PERICOLACE) 8.6-50 mg per tablet Take 1 tablet by mouth daily as needed for Constipation.    sucralfate (CARAFATE) 1 gram tablet Take 1 tablet (1 g total) by mouth 4 (four) times daily before meals and nightly.    tiZANidine (ZANAFLEX) 4 MG tablet Take 1 tablet (4 mg total) by mouth every 8 (eight) hours. for 10 days     Family History       Problem Relation (Age of Onset)    Diabetes Mother    Heart disease Mother, Father          Tobacco Use    Smoking status: Never    Smokeless tobacco: Never   Substance and Sexual Activity    Alcohol use: No    Drug use: Yes     Types: Marijuana     Comment: periodically     Sexual activity: Not Currently     Birth control/protection: See Surgical Hx     Review of Systems   Constitutional:  Negative for chills and fever.   HENT:  Negative for sore throat.    Eyes:  Negative for visual disturbance.   Respiratory:  Negative for shortness of breath.    Cardiovascular:  Negative for chest pain.   Gastrointestinal:  Positive for abdominal pain, nausea and  vomiting. Negative for blood in stool and diarrhea.   Endocrine: Negative for polyphagia.   Genitourinary:  Negative for dysuria.   Musculoskeletal:  Positive for arthralgias and myalgias.   Skin:  Negative for rash.   Neurological:  Negative for syncope.   Hematological:  Does not bruise/bleed easily.   Psychiatric/Behavioral:  Negative for confusion.      Objective:     Vital Signs (Most Recent):  Temp: 99.2 °F (37.3 °C) (03/11/25 1455)  Pulse: 77 (03/11/25 1700)  Resp: 18 (03/11/25 1733)  BP: (Abnormal) 146/80 (03/11/25 1700)  SpO2: 95 % (03/11/25 1700) Vital Signs (24h Range):  Temp:  [98.5 °F (36.9 °C)-99.2 °F (37.3 °C)] 99.2 °F (37.3 °C)  Pulse:  [73-88] 77  Resp:  [14-20] 18  SpO2:  [95 %-99 %] 95 %  BP: (117-169)/(63-93) 146/80     Weight: 94.3 kg (208 lb)  Body mass index is 38.04 kg/m².     Physical Exam  Vitals and nursing note reviewed.   Constitutional:       General: She is not in acute distress.  HENT:      Head: Normocephalic and atraumatic.      Nose: Nose normal.   Eyes:      Extraocular Movements: Extraocular movements intact.      Conjunctiva/sclera: Conjunctivae normal.   Cardiovascular:      Rate and Rhythm: Normal rate and regular rhythm.      Pulses: Normal pulses.   Pulmonary:      Effort: Pulmonary effort is normal. No respiratory distress.      Breath sounds: Normal breath sounds. No wheezing.   Abdominal:      General: Bowel sounds are normal. There is no distension.      Palpations: Abdomen is soft.      Tenderness: There is no abdominal tenderness. There is no guarding or rebound.   Musculoskeletal:      Cervical back: Normal range of motion.      Right lower leg: No edema.      Left lower leg: No edema.   Skin:     General: Skin is warm.      Comments: Right leg old scar    Neurological:      General: No focal deficit present.      Mental Status: She is alert and oriented to person, place, and time.   Psychiatric:         Mood and Affect: Mood normal.         Behavior: Behavior normal.          Thought Content: Thought content normal.                Significant Labs: All pertinent labs within the past 24 hours have been reviewed.    Significant Imaging: I have reviewed all pertinent imaging results/findings within the past 24 hours.  Assessment/Plan:     * Sickle cell disease, type S beta-zero thalassemia with crisis  Chronic opioid dependence  Chronic gastritis  - Patient presented with typical sickle cell pain crisis with recent hospitalization for same presentation  - Treat with IV fluids  - Continue chronic opioid therapy MS Contin 30 mg p.o. t.i.d., hydrocodone-acetaminophen  mg p.o. q.4 hours p.r.n. for moderate pain, nand have morphine 8 mg IV q.3 hours p.r.n. for severe pain  - Supportive care with antiemetics, diphenhydramine IV p.r.n.  - Continue home regimen for gastritis with Protonix 40 mg p.o. b.i.d., Carafate 1 g p.o. q.6 hours, Reglan 5 mg p.o. q.a.c.  - Continue folic acid 1 mg p.o. daily and hydroxyurea 1000 mg p.o. daily  - Trend with labs and correct electrolyte abnormalities as needed    Hx pulmonary embolism  Chronic anticoagulation  - Patient was abuse be on full-dose Lovenox versus apixaban; not on any due to inability to afford medications  - Will restart apixaban for now discuss patient's preference and ability to afford    Anxiety and depression  Trigeminal neuralgia  - Continue gabapentin 600 mg p.o. t.i.d., fluoxetine 80 mg p.o. daily,    Hypertension  - Continue amlodipine 10 mg p.o. daily and prazosin 1 mg p.o. q.h.s.      VTE Risk Mitigation (From admission, onward)           Ordered     apixaban tablet 5 mg  2 times daily         03/11/25 1822     IP VTE HIGH RISK PATIENT  Once         03/11/25 1259     Place sequential compression device  Until discontinued         03/11/25 1259                         Chasity Ritter MD  Department of Hospital Medicine  [facility] Emergency Dept

## 2025-03-11 NOTE — ED PROVIDER NOTES
Emergency Department Encounter  Provider Note    Nazanin Malone  [id number]  3/11/2025    Evaluation:    History Acquisition:     Chief Complaint   Patient presents with    Sickle Cell Pain Crisis       History of Present Illness:  Nazanin Malone is a 44 y.o. 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    The patient presents to the ED due to multiple complaints.  She endorses severe bilateral hip pain as well as burning abdominal pain.    Symptoms started 2 days ago.  She has history of similar symptoms related to Sickle Cell disease. She denies any improvement with her home pain regimen including Norco, oral morphine, and tizanidine.  She has chronic gastritis and is currently taking Protonix and Sucralfate without improvement. She reports nausea but denies any fever, vomiting, diarrhea, CP, or SOB.    Additional historians utilized:  none    Prior medical records were reviewed:   Admitted 2/26 - 3/2 for bilateral hip pain due to Sickle Cell pain crisis.   Admitted 2/16-2/23 for pain control due to Sickle Cell pain crisis.   Admitted 2/2-2/6 for Sickle Cell pain crisis.  Admitted 1/7-1/25 for Sickle Cell pain crisis.  Admitted 12/29-1/5 for Sickle Cell pain crisis.   Admitted 12/9-12/18 for Sickle Cell pain crisis.   Admitted 11/29-12/2 for Sickle Cell pain crisis.   Admitted 11/11-11/15 for Sickle Cell pain crisis, abdominal pain.     The patient's list of active medical history, family/social history, medications, and allergies as documented has been  reviewed.     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    Asthma     Avascular necrosis of femur     Depression     History of pulmonary embolism     Hypertension     Morbid obesity     Opioid dependence 2017    Pneumonia due to Streptococcus pneumoniae 2017    Right lower lobe pneumonia 2017    Sepsis due to Streptococcus pneumoniae 2017    Sickle cell-beta thalassemia disease with pain     Trigeminal neuralgia      Past Surgical History:   Procedure Laterality Date     SECTION      ESOPHAGOGASTRODUODENOSCOPY N/A 2024    Procedure: EGD (ESOPHAGOGASTRODUODENOSCOPY);  Surgeon: Allen Marshall MD;  Location: [address]);  Service: Gastroenterology;  Laterality: N/A;    TONSILLECTOMY      TUBAL LIGATION       Family History   Problem Relation Name Age of Onset    Heart disease Mother      Diabetes Mother      Heart disease Father      Breast cancer Neg Hx      Ovarian cancer Neg Hx      Colon cancer Neg Hx       Social History     Socioeconomic History    Marital status: Single   Tobacco Use    Smoking status: Never    Smokeless tobacco: Never   Substance and Sexual Activity    Alcohol use: No    Drug use: Yes     Types: Marijuana     Comment: periodically     Sexual activity: Not Currently     Birth control/protection: See Surgical Hx     Social Drivers of Health     Financial Resource Strain: Low Risk  (3/2/2025)    Overall Financial Resource Strain (CARDIA)     Difficulty of Paying Living Expenses: Not very hard   Recent Concern: Financial Resource Strain - High Risk (2024)    Overall Financial Resource Strain (CARDIA)     Difficulty of Paying Living Expenses: Very hard   Food Insecurity: No Food Insecurity (3/2/2025)    Hunger Vital Sign     Worried About Running Out of Food in the Last Year: Never true     Ran Out of Food in the Last Year: Never true    Recent Concern: Food Insecurity - Food Insecurity Present (12/11/2024)    Hunger Vital Sign     Worried About Running Out of Food in the Last Year: Often true     Ran Out of Food in the Last Year: Often true   Transportation Needs: No Transportation Needs (2/4/2025)    TRANSPORTATION NEEDS     Transportation : No   Physical Activity: Inactive (2/18/2025)    Exercise Vital Sign     Days of Exercise per Week: 0 days     Minutes of Exercise per Session: 0 min   Stress: No Stress Concern Present (3/2/2025)    [employer] - Occupational Stress Questionnaire     Feeling of Stress : Not at all   Recent Concern: Stress - Stress Concern Present (12/11/2024)    [employer] - Occupational Stress Questionnaire     Feeling of Stress : Very much   Housing Stability: Low Risk  (3/2/2025)    Housing Stability Vital Sign     Unable to Pay for Housing in the Last Year: No     Homeless in the Last Year: No   Recent Concern: Housing Stability - High Risk (12/11/2024)    Housing Stability Vital Sign     Unable to Pay for Housing in the Last Year: Yes     Homeless in the Last Year: No       Medications:  Medication List with Changes/Refills   Current Medications    ACETAMINOPHEN (TYLENOL) 325 MG TABLET    Take 2 tablets (650 mg total) by mouth every 8 (eight) hours as needed for Pain.    ALBUTEROL (VENTOLIN HFA) 90 MCG/ACTUATION INHALER    Inhale 2 puffs into the lungs every 6 (six) hours as needed for Wheezing or Shortness of Breath. Rescue    AMLODIPINE (NORVASC) 10 MG TABLET    Take 1 tablet (10 mg total) by mouth once daily.    ASCORBIC ACID (VITAMIN C ORAL)    Take 1 capsule by mouth 2 (two) times a day.    DICYCLOMINE (BENTYL) 10 MG CAPSULE    Take 1 capsule (10 mg total) by mouth 4 (four) times daily before meals and nightly.    ENOXAPARIN (LOVENOX) 40 MG/0.4 ML SYRG    Inject 0.4 mLs (40 mg total) into the skin every 12 (twelve) hours.    FLUOXETINE 40 MG CAPSULE    Take  2 capsules (80 mg total) by mouth once daily.    FLUTICASONE PROPIONATE (FLONASE) 50 MCG/ACTUATION NASAL SPRAY    INSTILL 1 SPRAY INTO EACH NOSTRIL EVERY DAY    FOLIC ACID (FOLVITE) 1 MG TABLET    Take 1 tablet (1 mg total) by mouth once daily.    GABAPENTIN (NEURONTIN) 300 MG CAPSULE    Take 2 capsules (600 mg total) by mouth 3 (three) times daily.    HYDROCODONE-ACETAMINOPHEN (NORCO)  MG PER TABLET    Take 1 tablet by mouth every 4 (four) hours as needed for Pain.    HYDROXYUREA (HYDREA) 500 MG CAP    Take 2 capsules (1,000 mg total) by mouth once daily.    METOCLOPRAMIDE HCL (REGLAN) 5 MG TABLET    Take 5 mg by mouth 4 (four) times daily.    MORPHINE (MS CONTIN) 30 MG 12 HR TABLET    Take 1 tablet (30 mg total) by mouth 3 (three) times daily.    NALOXONE (NARCAN) 4 MG/ACTUATION SPRY    4mg by nasal route as needed for opioid overdose; may repeat every 2-3 minutes in alternating nostrils until medical help arrives. Call 911    PANTOPRAZOLE (PROTONIX) 40 MG TABLET    Take 1 tablet (40 mg total) by mouth 2 (two) times daily.    PRAZOSIN (MINIPRESS) 1 MG CAP    Take 1 capsule (1 mg total) by mouth every evening.    SENNA-DOCUSATE 8.6-50 MG (PERICOLACE) 8.6-50 MG PER TABLET    Take 1 tablet by mouth daily as needed for Constipation.    SUCRALFATE (CARAFATE) 1 GRAM TABLET    Take 1 tablet (1 g total) by mouth 4 (four) times daily before meals and nightly.    TIZANIDINE (ZANAFLEX) 4 MG TABLET    Take 1 tablet (4 mg total) by mouth every 8 (eight) hours. for 10 days       Allergies:  Review of patient's allergies indicates:   Allergen Reactions    Cat dander Anaphylaxis, Itching and Shortness Of Breath    Nsaids (non-steroidal anti-inflammatory drug) Itching and Anaphylaxis    Latex Rash         Physical Exam:     Initial Vitals [03/11/25 0601]   BP Pulse Resp Temp SpO2   134/89 88 17 98.5 °F (36.9 °C) 99 %      MAP       --         Physical Exam    Nursing note and vitals reviewed.  Constitutional: She appears  well-developed and well-nourished. She is not diaphoretic. No distress.   HENT:   Head: Normocephalic and atraumatic. Mouth/Throat: Oropharynx is clear and moist.   Eyes: EOM are normal. Pupils are equal, round, and reactive to light.   Neck: No tracheal deviation present.   Cardiovascular:  Normal rate, regular rhythm, normal heart sounds and intact distal pulses.           Pulmonary/Chest: Breath sounds normal. No stridor. No respiratory distress. She has no wheezes.   Abdominal: Abdomen is soft and protuberant. Bowel sounds are normal. She exhibits no distension and no mass. There is abdominal tenderness in the epigastric area. There is no rebound and no guarding.   Musculoskeletal:         General: No edema. Normal range of motion.      Right hip: Tenderness and bony tenderness present.      Left hip: Tenderness and bony tenderness present.     Neurological: She is alert and oriented to person, place, and time. She has normal strength. No cranial nerve deficit or sensory deficit.   Skin: Skin is warm and dry. Capillary refill takes less than 2 seconds. No pallor.   Psychiatric: She has a normal mood and affect. Her behavior is normal. Thought content normal.         Differential Diagnoses:   Based on available information and initial assessment, Differential Diagnosis includes, but is not limited to:  Aplastic crisis, acute chest syndrome, avascular necrosis, symptomatic anemia, dehydration, medication side effect, non-compliance, chronic musculoskeletal pain.      ED Management:   Procedures    Orders Placed This Encounter    X-Ray Chest AP Portable    X-Ray Hips Bilateral 2 View Incl AP Pelvis    CBC Without Differential    Comprehensive metabolic panel    Reticulocytes    Urinalysis, Reflex to Urine Culture Urine, Clean Catch    Magnesium    Phosphorus    CBC with Automated Differential    Basic Metabolic Panel (BMP)    Diet Adult Regular    Vital signs    Strict intake and output (1) Document % meals taken  (2) # of urinations (3) # and consistency of BMs    Bladder scan    Straight Cath    Place sequential compression device    Recheck Blood Glucose:    Full code    EKG 12-lead    EKG 12-lead    Insert peripheral IV    Saline lock IV    Admit to Inpatient    Fall precautions    Aspiration precautions    sodium chloride 0.9% bolus 1,000 mL 1,000 mL    aluminum-magnesium hydroxide-simethicone 200-200-20 mg/5 mL suspension 5 mL    LIDOcaine viscous HCl 2% oral solution 5 mL    dicyclomine capsule 20 mg    morphine injection 8 mg    acetaminophen tablet 1,000 mg    ondansetron injection 4 mg    diphenhydrAMINE capsule 50 mg    morphine injection 12 mg    acetaminophen tablet 1,000 mg    amLODIPine tablet 10 mg    dicyclomine capsule 10 mg    FLUoxetine capsule 80 mg    folic acid tablet 1 mg    fluticasone propionate 50 mcg/actuation nasal spray 50 mcg    gabapentin capsule 600 mg    HYDROcodone-acetaminophen  mg per tablet 1 tablet    hydroxyurea capsule 1,000 mg    lactated ringers infusion    lactulose 20 gram/30 mL solution Soln 20 g    metoclopramide HCl tablet 5 mg    morphine 12 hr tablet 30 mg    pantoprazole EC tablet 40 mg    sucralfate 100 mg/mL suspension 1 g    aluminum-magnesium hydroxide-simethicone 200-200-20 mg/5 mL suspension 30 mL    prazosin capsule 1 mg    senna-docusate 8.6-50 mg per tablet 1 tablet    sucralfate tablet 1 g    tiZANidine tablet 4 mg    sodium chloride 0.9% flush 10 mL    insulin aspart U-100 pen 0-5 Units    melatonin tablet 6 mg    ondansetron injection 4 mg    polyethylene glycol packet 17 g    bisacodyL suppository 10 mg    acetaminophen tablet 650 mg    naloxone 0.4 mg/mL injection 0.02 mg    glucose chewable tablet 16 g    glucose chewable tablet 24 g    dextrose 50% injection 12.5 g    dextrose 50% injection 25 g    glucagon (human recombinant) injection 1 mg    enoxaparin injection 40 mg    morphine injection 8 mg    diphenhydrAMINE injection 25 mg    mupirocin 2 %  ointment    IP VTE HIGH RISK PATIENT    Progressive Mobility Protocol (mobilize patient to their highest level of functioning at least twice daily)          EKG:   EKG interpretation by ED attending physician:  NSR, rate 84, no ST changes, no ischemia, normal intervals.  Compared with prior EKG dated 02/20254, grossly stable without significant change.      Labs:     Labs Reviewed   CBC WITHOUT DIFFERENTIAL - Abnormal       Result Value    WBC 9.56      RBC 4.16      Hemoglobin 9.8 (*)     Hematocrit 28.8 (*)     MCV 69 (*)     MCH 23.6 (*)     MCHC 34.0      RDW 23.4 (*)     Platelets 295      MPV 9.6     COMPREHENSIVE METABOLIC PANEL    Sodium 139      Potassium 3.5      Chloride 106      CO2 25      Glucose 104      BUN 14      Creatinine 1.0      Calcium 9.7      Total Protein 8.3      Albumin 3.8      Total Bilirubin 0.8      Alkaline Phosphatase 97      AST 22      ALT 16      eGFR >60      Anion Gap 8     RETICULOCYTES    Retic 2.1     URINALYSIS, REFLEX TO URINE CULTURE     Independent review of the labs ordered include:   See ED course    Imaging:     Imaging Results              X-Ray Hips Bilateral 2 View Incl AP Pelvis (Final result)  Result time 03/11/25 09:05:16      Final result by Dustin Max Jr., MD (03/11/25 09:05:16)                   Impression:      Normal      Electronically signed by: Dustin Baron Jr  Date:    03/11/2025  Time:    09:05               Narrative:    EXAMINATION:  XR HIPS BILATERAL 2 VIEW INCL AP PELVIS    CLINICAL HISTORY:  Pain in right hip    TECHNIQUE:  AP view of the pelvis and frogleg lateral views of both hips were performed.    COMPARISON:  02/23/2021    FINDINGS:  Both hips are located without fracture or significant joint space narrowing.    The pelvic rings are intact.    Bilateral pelvic phleboliths.                                       X-Ray Chest AP Portable (Final result)  Result time 03/11/25 07:36:50      Final result by Dustin Max Jr.  MD (03/11/25 07:36:50)                   Impression:      No acute cardiopulmonary disease      Electronically signed by: Dustin Baron Jr  Date:    03/11/2025  Time:    07:36               Narrative:    EXAMINATION:  XR CHEST AP PORTABLE    CLINICAL HISTORY:  sickle cell pain crisis, eval for acute chest;    TECHNIQUE:  Single frontal view of the chest was performed.    COMPARISON:  02/27/2025    FINDINGS:  Cardiomediastinal silhouetteright jugular medication port tip overlies the SVC.    Lungs grossly clear within limitations of portable technique    Pleura, diaphragm, and thoracic cage grossly unremarkable.                                         Medications Given:     Medications   acetaminophen tablet 1,000 mg (has no administration in time range)   amLODIPine tablet 10 mg (has no administration in time range)   dicyclomine capsule 10 mg (has no administration in time range)   FLUoxetine capsule 80 mg (has no administration in time range)   folic acid tablet 1 mg (has no administration in time range)   fluticasone propionate 50 mcg/actuation nasal spray 50 mcg (has no administration in time range)   gabapentin capsule 600 mg (has no administration in time range)   HYDROcodone-acetaminophen  mg per tablet 1 tablet (has no administration in time range)   hydroxyurea capsule 1,000 mg (has no administration in time range)   lactated ringers infusion (has no administration in time range)   lactulose 20 gram/30 mL solution Soln 20 g (has no administration in time range)   metoclopramide HCl tablet 5 mg (has no administration in time range)   morphine 12 hr tablet 30 mg (has no administration in time range)   pantoprazole EC tablet 40 mg (has no administration in time range)   sucralfate 100 mg/mL suspension 1 g (has no administration in time range)   aluminum-magnesium hydroxide-simethicone 200-200-20 mg/5 mL suspension 30 mL (has no administration in time range)   prazosin capsule 1 mg (has no  administration in time range)   senna-docusate 8.6-50 mg per tablet 1 tablet (has no administration in time range)   sucralfate tablet 1 g (has no administration in time range)   tiZANidine tablet 4 mg (has no administration in time range)   sodium chloride 0.9% flush 10 mL (has no administration in time range)   insulin aspart U-100 pen 0-5 Units (has no administration in time range)   melatonin tablet 6 mg (has no administration in time range)   ondansetron injection 4 mg (has no administration in time range)   polyethylene glycol packet 17 g (has no administration in time range)   bisacodyL suppository 10 mg (has no administration in time range)   acetaminophen tablet 650 mg (has no administration in time range)   naloxone 0.4 mg/mL injection 0.02 mg (has no administration in time range)   glucose chewable tablet 16 g (has no administration in time range)   glucose chewable tablet 24 g (has no administration in time range)   dextrose 50% injection 12.5 g (has no administration in time range)   dextrose 50% injection 25 g (has no administration in time range)   glucagon (human recombinant) injection 1 mg (has no administration in time range)   enoxaparin injection 40 mg (has no administration in time range)   morphine injection 8 mg (has no administration in time range)   diphenhydrAMINE injection 25 mg (has no administration in time range)   mupirocin 2 % ointment (has no administration in time range)   sodium chloride 0.9% bolus 1,000 mL 1,000 mL (1,000 mLs Intravenous New Bag 3/11/25 0915)   aluminum-magnesium hydroxide-simethicone 200-200-20 mg/5 mL suspension 5 mL (5 mLs Oral Given 3/11/25 0912)   LIDOcaine viscous HCl 2% oral solution 5 mL (5 mLs Oral Given 3/11/25 0912)   dicyclomine capsule 20 mg (20 mg Oral Given 3/11/25 0915)   morphine injection 8 mg (8 mg Intravenous Given 3/11/25 0914)   acetaminophen tablet 1,000 mg (1,000 mg Oral Given 3/11/25 0914)   ondansetron injection 4 mg (4 mg Intravenous  Given 3/11/25 0914)   diphenhydrAMINE capsule 50 mg (50 mg Oral Given 3/11/25 1050)   morphine injection 12 mg (12 mg Intravenous Given 3/11/25 1050)        Medical Decision Making:    Additional Consideration:   Additional testing considered during clinical course: none    Social determinants of health considered during development of treatment plan include: poor access to care    Current co-morbidities considered which impacted clinical decision making: sickle cell anemia, HTN, prior PE on Lovenox, asthma    Case discussed with additional provider: Dr. Ritter, , will admit for further management of sickle cell pain crisis    ED Course as of 03/11/25 1333   Tue Mar 11, 2025   0958 SpO2: 99 % [SS]   0958 Resp: 17 [SS]   0958 Pulse: 88 [SS]   0958 Temp Source: Oral [SS]   0958 Temp: 98.5 °F (36.9 °C) [SS]   0958 MAP (mmHg): 104 [SS]   0958 BP: 127/87  Vitals reassuring, afebrile [SS]   0958 X-Ray Hips Bilateral 2 View Incl AP Pelvis  Hip x-ray independently interpreted: no fracture or dislocation.  Agree with radiologist interpretation.    [SS]   0958 X-Ray Chest AP Portable  CXR independently interpreted: port in place. no focal infiltrate, effusion, edema, free air, or other acute process.  Agree with radiologist interpretation.    [SS]   0959 CBC Without Differential(!)  Chronic microcytic anemia, improved from prior [SS]   0959 Reticulocytes  Normal reticulocyte count [SS]   1043 On reassessment, patient still complaining of severe pain.  Will give additional dose of pain medication and reassess. [SS]      ED Course User Index  [SS] Terrance Lester MD            Medical Decision Making  43 yo F with sickle cell anemia, multiple prior PEs on chronic anticoagulation (intermittently compliant with Eliquis due to cost so changed to Lovenox during recent admission), asthma, HTN presents to ED with bilateral hip and leg pain described as typical pain crisis. Allergic to NSAIDs. Given multiple doses of IV pain  medication in ED without improvement. Labs appear at baseline. CXR clear. Hip x-ray without acute process. Requesting admission for pain control in setting of Sickle Cell disease.    Problems Addressed:  Bilateral hip pain: chronic illness or injury with exacerbation, progression, or side effects of treatment  Epigastric pain: chronic illness or injury with exacerbation, progression, or side effects of treatment  Sickle cell pain crisis: chronic illness or injury with exacerbation, progression, or side effects of treatment    Amount and/or Complexity of Data Reviewed  External Data Reviewed: notes.  Labs: ordered. Decision-making details documented in ED Course.  Radiology: ordered and independent interpretation performed. Decision-making details documented in ED Course.  ECG/medicine tests: ordered and independent interpretation performed. Decision-making details documented in ED Course.    Risk  OTC drugs.  Prescription drug management.  Decision regarding hospitalization.  Diagnosis or treatment significantly limited by social determinants of health.        Clinical Impression:       ICD-10-CM ICD-9-CM   1. Sickle cell pain crisis  D57.00 282.62   2. Bilateral hip pain  M25.551 719.45    M25.552    3. Epigastric pain  R10.13 789.06   4. Chest pain  R07.9 786.50         Follow-up Information    None          ED Disposition Condition    Admit Stable              On re-evaluation, the patient's status has remained stable.  At this time, I believe the patient should be admitted to the hospital for further evaluation and management.  The consulting physician/team agrees with plan and will admit under their service.   The patient and family were updated with test results, overall impression, and further plan of care. All questions were answered.       Terrance Lester MD  03/11/25 2218

## 2025-03-11 NOTE — ASSESSMENT & PLAN NOTE
Chronic anticoagulation  - Patient was abuse be on full-dose Lovenox versus apixaban; not on any due to inability to afford medications  - Will restart apixaban for now discuss patient's preference and ability to afford

## 2025-03-11 NOTE — Clinical Note
Diagnosis: Sickle cell pain crisis [[id number]]   Reason for IP Medical Treatment  (Clinical interventions that can only be accomplished in the IP setting? ) :: IV pain medication

## 2025-03-11 NOTE — SUBJECTIVE & OBJECTIVE
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    Asthma     Avascular necrosis of femur     Depression     History of pulmonary embolism     Hypertension     Morbid obesity     Opioid dependence 2017    Pneumonia due to Streptococcus pneumoniae 2017    Right lower lobe pneumonia 2017    Sepsis due to Streptococcus pneumoniae 2017    Sickle cell-beta thalassemia disease with pain     Trigeminal neuralgia        Past Surgical History:   Procedure Laterality Date     SECTION      ESOPHAGOGASTRODUODENOSCOPY N/A 2024    Procedure: EGD (ESOPHAGOGASTRODUODENOSCOPY);  Surgeon: Allen Marshall MD;  Location: [address]);  Service: Gastroenterology;  Laterality: N/A;    TONSILLECTOMY      TUBAL LIGATION         Review of patient's allergies indicates:   Allergen Reactions    Cat dander Anaphylaxis, Itching and Shortness Of Breath    Nsaids (non-steroidal anti-inflammatory drug) Itching and Anaphylaxis    Latex Rash       No current facility-administered medications on file prior to encounter.     Current Outpatient Medications on File Prior to Encounter   Medication Sig    acetaminophen (TYLENOL) 325 MG tablet Take 2 tablets (650 mg total) by mouth every 8 (eight) hours as needed for Pain.    albuterol (VENTOLIN HFA) 90 mcg/actuation inhaler Inhale 2 puffs into the lungs every 6 (six) hours as needed for Wheezing or Shortness of Breath. Rescue    amLODIPine (NORVASC) 10 MG tablet Take 1 tablet (10 mg total) by mouth once daily.    ascorbic acid (VITAMIN C ORAL) Take 1 capsule by mouth 2 (two) times a day.    dicyclomine (BENTYL) 10 MG capsule Take 1 capsule (10 mg total) by mouth 4 (four) times daily before meals and nightly.    enoxaparin (LOVENOX) 40 mg/0.4 mL Syrg Inject 0.4 mLs (40 mg total) into the skin every 12 (twelve) hours.    FLUoxetine 40 MG capsule Take 2 capsules  (80 mg total) by mouth once daily.    fluticasone propionate (FLONASE) 50 mcg/actuation nasal spray INSTILL 1 SPRAY INTO EACH NOSTRIL EVERY DAY    folic acid (FOLVITE) 1 MG tablet Take 1 tablet (1 mg total) by mouth once daily.    gabapentin (NEURONTIN) 300 MG capsule Take 2 capsules (600 mg total) by mouth 3 (three) times daily. (Patient taking differently: Take 2 capsules by mouth 3 (three) times daily.)    HYDROcodone-acetaminophen (NORCO)  mg per tablet Take 1 tablet by mouth every 4 (four) hours as needed for Pain. (Patient taking differently: Take 1 tablet by mouth every 4 to 6 hours as needed for Pain.)    hydroxyurea (HYDREA) 500 mg Cap Take 2 capsules (1,000 mg total) by mouth once daily.    metoclopramide HCl (REGLAN) 5 MG tablet Take 5 mg by mouth 4 (four) times daily.    morphine (MS CONTIN) 30 MG 12 hr tablet Take 1 tablet (30 mg total) by mouth 3 (three) times daily.    naloxone (NARCAN) 4 mg/actuation Spry 4mg by nasal route as needed for opioid overdose; may repeat every 2-3 minutes in alternating nostrils until medical help arrives. Call 911    pantoprazole (PROTONIX) 40 MG tablet Take 1 tablet (40 mg total) by mouth 2 (two) times daily.    prazosin (MINIPRESS) 1 MG Cap Take 1 capsule (1 mg total) by mouth every evening.    senna-docusate 8.6-50 mg (PERICOLACE) 8.6-50 mg per tablet Take 1 tablet by mouth daily as needed for Constipation.    sucralfate (CARAFATE) 1 gram tablet Take 1 tablet (1 g total) by mouth 4 (four) times daily before meals and nightly.    tiZANidine (ZANAFLEX) 4 MG tablet Take 1 tablet (4 mg total) by mouth every 8 (eight) hours. for 10 days     Family History       Problem Relation (Age of Onset)    Diabetes Mother    Heart disease Mother, Father          Tobacco Use    Smoking status: Never    Smokeless tobacco: Never   Substance and Sexual Activity    Alcohol use: No    Drug use: Yes     Types: Marijuana     Comment: periodically     Sexual activity: Not Currently      Birth control/protection: See Surgical Hx     Review of Systems   Constitutional:  Negative for chills and fever.   HENT:  Negative for sore throat.    Eyes:  Negative for visual disturbance.   Respiratory:  Negative for shortness of breath.    Cardiovascular:  Negative for chest pain.   Gastrointestinal:  Positive for abdominal pain, nausea and vomiting. Negative for blood in stool and diarrhea.   Endocrine: Negative for polyphagia.   Genitourinary:  Negative for dysuria.   Musculoskeletal:  Positive for arthralgias and myalgias.   Skin:  Negative for rash.   Neurological:  Negative for syncope.   Hematological:  Does not bruise/bleed easily.   Psychiatric/Behavioral:  Negative for confusion.      Objective:     Vital Signs (Most Recent):  Temp: 99.2 °F (37.3 °C) (03/11/25 1455)  Pulse: 77 (03/11/25 1700)  Resp: 18 (03/11/25 1733)  BP: (Abnormal) 146/80 (03/11/25 1700)  SpO2: 95 % (03/11/25 1700) Vital Signs (24h Range):  Temp:  [98.5 °F (36.9 °C)-99.2 °F (37.3 °C)] 99.2 °F (37.3 °C)  Pulse:  [73-88] 77  Resp:  [14-20] 18  SpO2:  [95 %-99 %] 95 %  BP: (117-169)/(63-93) 146/80     Weight: 94.3 kg (208 lb)  Body mass index is 38.04 kg/m².     Physical Exam  Vitals and nursing note reviewed.   Constitutional:       General: She is not in acute distress.  HENT:      Head: Normocephalic and atraumatic.      Nose: Nose normal.   Eyes:      Extraocular Movements: Extraocular movements intact.      Conjunctiva/sclera: Conjunctivae normal.   Cardiovascular:      Rate and Rhythm: Normal rate and regular rhythm.      Pulses: Normal pulses.   Pulmonary:      Effort: Pulmonary effort is normal. No respiratory distress.      Breath sounds: Normal breath sounds. No wheezing.   Abdominal:      General: Bowel sounds are normal. There is no distension.      Palpations: Abdomen is soft.      Tenderness: There is no abdominal tenderness. There is no guarding or rebound.   Musculoskeletal:      Cervical back: Normal range of motion.       Right lower leg: No edema.      Left lower leg: No edema.   Skin:     General: Skin is warm.      Comments: Right leg old scar    Neurological:      General: No focal deficit present.      Mental Status: She is alert and oriented to person, place, and time.   Psychiatric:         Mood and Affect: Mood normal.         Behavior: Behavior normal.         Thought Content: Thought content normal.                Significant Labs: All pertinent labs within the past 24 hours have been reviewed.    Significant Imaging: I have reviewed all pertinent imaging results/findings within the past 24 hours.

## 2025-03-11 NOTE — ED TRIAGE NOTES
Pt presents to ED c/o 10/10 pain to BL hips and legs onset this morning. Hx of SCD, believes she is currently in a pain crisis and states home medications have not been treating her pain. Pt also reporting nausea with no vomiting onset this am. Denies abd pain. Pt AAO x4, NADN.

## 2025-03-11 NOTE — HPI
44 y.o. 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 medications.  Patient reports when her pain flared up, she started to have nausea and vomiting and heartburn that cause her abdominal pain.  This is consistent with what happens when her pain crisis flare up which causes her chronic gastritis get worse despite being on Protonix and sucralfate.  Also reported nausea and vomiting, but no shortness of breath or chest pain.  No fevers or chills.  Pain typical of her pain crisis in the past, which have become more frequent.  Recently hospitalized at this facility from 2/26 - 3/2/2025 with same presentation..  In the ER, IV fluids and multiple IV pushes of pain medications were given without relief.  Workup remarkable for hemoglobin 9.8, normal retic count of 2.1.  Chest x-ray and bilateral hip films were unremarkable for any acute abnormalities.

## 2025-03-12 LAB
ANION GAP SERPL CALC-SCNC: 8 MMOL/L (ref 8–16)
BASOPHILS # BLD AUTO: 0.04 K/UL (ref 0–0.2)
BASOPHILS NFR BLD: 0.6 % (ref 0–1.9)
BUN SERPL-MCNC: 14 MG/DL (ref 6–20)
CALCIUM SERPL-MCNC: 8.7 MG/DL (ref 8.7–10.5)
CHLORIDE SERPL-SCNC: 108 MMOL/L (ref 95–110)
CO2 SERPL-SCNC: 24 MMOL/L (ref 23–29)
CREAT SERPL-MCNC: 1.1 MG/DL (ref 0.5–1.4)
DIFFERENTIAL METHOD BLD: ABNORMAL
EOSINOPHIL # BLD AUTO: 0.2 K/UL (ref 0–0.5)
EOSINOPHIL NFR BLD: 3.2 % (ref 0–8)
ERYTHROCYTE [DISTWIDTH] IN BLOOD BY AUTOMATED COUNT: 22.7 % (ref 11.5–14.5)
EST. GFR  (NO RACE VARIABLE): >60 ML/MIN/1.73 M^2
GLUCOSE SERPL-MCNC: 83 MG/DL (ref 70–110)
HCT VFR BLD AUTO: 25.6 % (ref 37–48.5)
HGB BLD-MCNC: 8.4 G/DL (ref 12–16)
IMM GRANULOCYTES # BLD AUTO: 0.01 K/UL (ref 0–0.04)
IMM GRANULOCYTES NFR BLD AUTO: 0.1 % (ref 0–0.5)
LYMPHOCYTES # BLD AUTO: 3.3 K/UL (ref 1–4.8)
LYMPHOCYTES NFR BLD: 47.4 % (ref 18–48)
MAGNESIUM SERPL-MCNC: 1.9 MG/DL (ref 1.6–2.6)
MCH RBC QN AUTO: 23.3 PG (ref 27–31)
MCHC RBC AUTO-ENTMCNC: 32.8 G/DL (ref 32–36)
MCV RBC AUTO: 71 FL (ref 82–98)
MONOCYTES # BLD AUTO: 1 K/UL (ref 0.3–1)
MONOCYTES NFR BLD: 14.6 % (ref 4–15)
NEUTROPHILS # BLD AUTO: 2.3 K/UL (ref 1.8–7.7)
NEUTROPHILS NFR BLD: 34.1 % (ref 38–73)
NRBC BLD-RTO: 1 /100 WBC
OHS QRS DURATION: 70 MS
OHS QRS DURATION: 96 MS
OHS QTC CALCULATION: 403 MS
OHS QTC CALCULATION: 484 MS
PHOSPHATE SERPL-MCNC: 4.1 MG/DL (ref 2.7–4.5)
PLATELET # BLD AUTO: 315 K/UL (ref 150–450)
PMV BLD AUTO: 9.3 FL (ref 9.2–12.9)
POTASSIUM SERPL-SCNC: 3.6 MMOL/L (ref 3.5–5.1)
RBC # BLD AUTO: 3.61 M/UL (ref 4–5.4)
SODIUM SERPL-SCNC: 140 MMOL/L (ref 136–145)
WBC # BLD AUTO: 6.85 K/UL (ref 3.9–12.7)

## 2025-03-12 PROCEDURE — 25000003 PHARM REV CODE 250: Performed by: HOSPITALIST

## 2025-03-12 PROCEDURE — 80048 BASIC METABOLIC PNL TOTAL CA: CPT | Performed by: HOSPITALIST

## 2025-03-12 PROCEDURE — 84100 ASSAY OF PHOSPHORUS: CPT | Performed by: HOSPITALIST

## 2025-03-12 PROCEDURE — A4216 STERILE WATER/SALINE, 10 ML: HCPCS | Performed by: HOSPITALIST

## 2025-03-12 PROCEDURE — 21400001 HC TELEMETRY ROOM

## 2025-03-12 PROCEDURE — 83735 ASSAY OF MAGNESIUM: CPT | Performed by: HOSPITALIST

## 2025-03-12 PROCEDURE — 25000242 PHARM REV CODE 250 ALT 637 W/ HCPCS: Performed by: HOSPITALIST

## 2025-03-12 PROCEDURE — 85025 COMPLETE CBC W/AUTO DIFF WBC: CPT | Performed by: HOSPITALIST

## 2025-03-12 PROCEDURE — 25000003 PHARM REV CODE 250: Performed by: EMERGENCY MEDICINE

## 2025-03-12 PROCEDURE — 63600175 PHARM REV CODE 636 W HCPCS: Performed by: HOSPITALIST

## 2025-03-12 RX ORDER — DIPHENHYDRAMINE HYDROCHLORIDE 50 MG/ML
50 INJECTION, SOLUTION INTRAMUSCULAR; INTRAVENOUS
Status: DISCONTINUED | OUTPATIENT
Start: 2025-03-12 | End: 2025-03-14 | Stop reason: HOSPADM

## 2025-03-12 RX ADMIN — PANTOPRAZOLE SODIUM 40 MG: 40 TABLET, DELAYED RELEASE ORAL at 09:03

## 2025-03-12 RX ADMIN — TIZANIDINE 4 MG: 2 TABLET ORAL at 06:03

## 2025-03-12 RX ADMIN — LACTULOSE 20 G: 20 SOLUTION ORAL at 09:03

## 2025-03-12 RX ADMIN — DIPHENHYDRAMINE HYDROCHLORIDE 25 MG: 50 INJECTION INTRAMUSCULAR; INTRAVENOUS at 09:03

## 2025-03-12 RX ADMIN — APIXABAN 5 MG: 2.5 TABLET, FILM COATED ORAL at 09:03

## 2025-03-12 RX ADMIN — DIPHENHYDRAMINE HYDROCHLORIDE 50 MG: 50 INJECTION INTRAMUSCULAR; INTRAVENOUS at 08:03

## 2025-03-12 RX ADMIN — DIPHENHYDRAMINE HYDROCHLORIDE 25 MG: 50 INJECTION INTRAMUSCULAR; INTRAVENOUS at 03:03

## 2025-03-12 RX ADMIN — METOCLOPRAMIDE 5 MG: 5 TABLET ORAL at 06:03

## 2025-03-12 RX ADMIN — DICYCLOMINE HYDROCHLORIDE 10 MG: 10 CAPSULE ORAL at 09:03

## 2025-03-12 RX ADMIN — ONDANSETRON 4 MG: 2 INJECTION INTRAMUSCULAR; INTRAVENOUS at 09:03

## 2025-03-12 RX ADMIN — MORPHINE SULFATE 8 MG: 4 INJECTION INTRAVENOUS at 12:03

## 2025-03-12 RX ADMIN — ALUMINUM HYDROXIDE, MAGNESIUM HYDROXIDE, AND DIMETHICONE 30 ML: 200; 20; 200 SUSPENSION ORAL at 09:03

## 2025-03-12 RX ADMIN — Medication 10 ML: at 09:03

## 2025-03-12 RX ADMIN — METOCLOPRAMIDE 5 MG: 5 TABLET ORAL at 11:03

## 2025-03-12 RX ADMIN — Medication 10 ML: at 01:03

## 2025-03-12 RX ADMIN — SUCRALFATE 1 G: 1 TABLET ORAL at 06:03

## 2025-03-12 RX ADMIN — MORPHINE SULFATE 30 MG: 15 TABLET, FILM COATED, EXTENDED RELEASE ORAL at 09:03

## 2025-03-12 RX ADMIN — HYDROXYUREA 1000 MG: 500 CAPSULE ORAL at 09:03

## 2025-03-12 RX ADMIN — AMLODIPINE BESYLATE 10 MG: 5 TABLET ORAL at 09:03

## 2025-03-12 RX ADMIN — DIPHENHYDRAMINE HYDROCHLORIDE 50 MG: 50 INJECTION INTRAMUSCULAR; INTRAVENOUS at 01:03

## 2025-03-12 RX ADMIN — MORPHINE SULFATE 8 MG: 4 INJECTION INTRAVENOUS at 06:03

## 2025-03-12 RX ADMIN — POLYETHYLENE GLYCOL 3350 17 G: 17 POWDER, FOR SOLUTION ORAL at 09:03

## 2025-03-12 RX ADMIN — MORPHINE SULFATE 8 MG: 4 INJECTION INTRAVENOUS at 01:03

## 2025-03-12 RX ADMIN — MORPHINE SULFATE 8 MG: 4 INJECTION INTRAVENOUS at 08:03

## 2025-03-12 RX ADMIN — SODIUM CHLORIDE, POTASSIUM CHLORIDE, SODIUM LACTATE AND CALCIUM CHLORIDE: 600; 310; 30; 20 INJECTION, SOLUTION INTRAVENOUS at 03:03

## 2025-03-12 RX ADMIN — ACETAMINOPHEN 1000 MG: 500 TABLET ORAL at 01:03

## 2025-03-12 RX ADMIN — ALUMINUM HYDROXIDE, MAGNESIUM HYDROXIDE, AND DIMETHICONE 30 ML: 200; 20; 200 SUSPENSION ORAL at 04:03

## 2025-03-12 RX ADMIN — SUCRALFATE 1 G: 1 TABLET ORAL at 04:03

## 2025-03-12 RX ADMIN — GABAPENTIN 600 MG: 300 CAPSULE ORAL at 09:03

## 2025-03-12 RX ADMIN — DIPHENHYDRAMINE HYDROCHLORIDE 25 MG: 50 INJECTION INTRAMUSCULAR; INTRAVENOUS at 12:03

## 2025-03-12 RX ADMIN — SUCRALFATE 1 G: 1 TABLET ORAL at 09:03

## 2025-03-12 RX ADMIN — TIZANIDINE 4 MG: 2 TABLET ORAL at 09:03

## 2025-03-12 RX ADMIN — MORPHINE SULFATE 8 MG: 4 INJECTION INTRAVENOUS at 03:03

## 2025-03-12 RX ADMIN — FLUTICASONE PROPIONATE 50 MCG: 50 SPRAY, METERED NASAL at 09:03

## 2025-03-12 RX ADMIN — ALUMINUM HYDROXIDE, MAGNESIUM HYDROXIDE, AND DIMETHICONE 30 ML: 200; 20; 200 SUSPENSION ORAL at 11:03

## 2025-03-12 RX ADMIN — SUCRALFATE 1 G: 1 TABLET ORAL at 11:03

## 2025-03-12 RX ADMIN — MORPHINE SULFATE 8 MG: 4 INJECTION INTRAVENOUS at 09:03

## 2025-03-12 RX ADMIN — FLUOXETINE HYDROCHLORIDE 80 MG: 20 CAPSULE ORAL at 09:03

## 2025-03-12 RX ADMIN — FOLIC ACID 1 MG: 1 TABLET ORAL at 09:03

## 2025-03-12 RX ADMIN — MORPHINE SULFATE 8 MG: 4 INJECTION INTRAVENOUS at 04:03

## 2025-03-12 RX ADMIN — ACETAMINOPHEN 1000 MG: 500 TABLET ORAL at 09:03

## 2025-03-12 RX ADMIN — PRAZOSIN HYDROCHLORIDE 1 MG: 1 CAPSULE ORAL at 09:03

## 2025-03-12 RX ADMIN — DICYCLOMINE HYDROCHLORIDE 10 MG: 10 CAPSULE ORAL at 11:03

## 2025-03-12 RX ADMIN — ACETAMINOPHEN 1000 MG: 500 TABLET ORAL at 06:03

## 2025-03-12 RX ADMIN — MUPIROCIN: 20 OINTMENT TOPICAL at 09:03

## 2025-03-12 RX ADMIN — DICYCLOMINE HYDROCHLORIDE 10 MG: 10 CAPSULE ORAL at 04:03

## 2025-03-12 RX ADMIN — Medication 10 ML: at 06:03

## 2025-03-12 RX ADMIN — DICYCLOMINE HYDROCHLORIDE 10 MG: 10 CAPSULE ORAL at 06:03

## 2025-03-12 RX ADMIN — TIZANIDINE 4 MG: 2 TABLET ORAL at 01:03

## 2025-03-12 RX ADMIN — MORPHINE SULFATE 30 MG: 15 TABLET, FILM COATED, EXTENDED RELEASE ORAL at 02:03

## 2025-03-12 RX ADMIN — DIPHENHYDRAMINE HYDROCHLORIDE 50 MG: 50 INJECTION INTRAMUSCULAR; INTRAVENOUS at 04:03

## 2025-03-12 RX ADMIN — METOCLOPRAMIDE 5 MG: 5 TABLET ORAL at 04:03

## 2025-03-12 RX ADMIN — DIPHENHYDRAMINE HYDROCHLORIDE 25 MG: 50 INJECTION INTRAMUSCULAR; INTRAVENOUS at 06:03

## 2025-03-12 RX ADMIN — GABAPENTIN 600 MG: 300 CAPSULE ORAL at 02:03

## 2025-03-12 RX ADMIN — SENNOSIDES AND DOCUSATE SODIUM 1 TABLET: 50; 8.6 TABLET ORAL at 09:03

## 2025-03-12 NOTE — PLAN OF CARE
Case Management Assessment     PCP: None    Patient Arrived From: Home  Existing Help at Home: Self, Independent    Barriers to Discharge: None    Discharge Plan:    A. Home w/family   B. Home      Patient AAOx3, independent at baseline. Will need to establish care with PCP on discharge. Denies owning any DME. Will drive self home on discharge.      03/12/25 0854   Discharge Assessment   Assessment Type Discharge Planning Assessment   Confirmed/corrected address, phone number and insurance Yes   Confirmed Demographics Correct on Facesheet   Source of Information patient   Communicated ALYSE with patient/caregiver Date not available/Unable to determine   People in Home child(jayce), adult   Do you expect to return to your current living situation? Yes   Prior to hospitilization cognitive status: Alert/Oriented   Current cognitive status: Alert/Oriented   Walking or Climbing Stairs Difficulty no   Dressing/Bathing Difficulty no   Equipment Currently Used at Home none   Readmission within 30 days? Yes   Do you currently have service(s) that help you manage your care at home? No   Do you take prescription medications? Yes   Do you have prescription coverage? Yes   Do you have any problems affording any of your prescribed medications? No   Is the patient taking medications as prescribed? yes   Who is going to help you get home at discharge? Will drive self home   How do you get to doctors appointments? car, drives self   Are you on dialysis? No   Do you take coumadin? No   Discharge Plan A Home with family   Discharge Plan B Home   DME Needed Upon Discharge  none   Discharge Plan discussed with: Patient   Transition of Care Barriers None     [facility] ([address])  Initial Discharge Assessment       Primary Care Provider: No, Primary Doctor    Admission Diagnosis: Epigastric pain [R10.13]  Bilateral hip pain [M25.551, M25.552]  Sickle cell pain crisis [D57.00]  Chest pain [R07.9]    Admission Date:  3/11/2025  Expected Discharge Date:     Transition of Care Barriers: (P) None    Payor: [employer] MANAGED MEDICARE / Plan: [employer] MEDICARE PLAN HMO / Product Type: Medicare Advantage /     Extended Emergency Contact Information  Primary Emergency Contact: Jack Malone  Mobile Phone: [phone]  Relation: Brother  Secondary Emergency Contact: Timoteo Malone  Mobile Phone: [phone]  Relation: Daughter    Discharge Plan A: (P) Home with family  Discharge Plan B: (P) Home      [facility] [address] [place] [address]  Phone: [phone] Fax: [phone]      Initial Assessment (most recent)       Adult Discharge Assessment - 03/12/25 0854          Discharge Assessment    Assessment Type Discharge Planning Assessment (P)      Confirmed/corrected address, phone number and insurance Yes (P)      Confirmed Demographics Correct on Facesheet (P)      Source of Information patient (P)      Communicated ALYSE with patient/caregiver Date not available/Unable to determine (P)      People in Home child(jayce), adult (P)      Do you expect to return to your current living situation? Yes (P)      Prior to hospitilization cognitive status: Alert/Oriented (P)      Current cognitive status: Alert/Oriented (P)      Walking or Climbing Stairs Difficulty no (P)      Dressing/Bathing Difficulty no (P)      Equipment Currently Used at Home none (P)      Readmission within 30 days? Yes (P)      Do you currently have service(s) that help you manage your care at home? No (P)      Do you take prescription medications? Yes (P)      Do you have prescription coverage? Yes (P)      Do you have any problems affording any of your prescribed medications? No (P)      Is the patient taking medications as prescribed? yes (P)      Who is going to help you get home at discharge? Will drive self home (P)      How do you get to doctors appointments? car, drives self (P)      Are you on dialysis? No (P)      Do you take  coumadin? No (P)      Discharge Plan A Home with family (P)      Discharge Plan B Home (P)      DME Needed Upon Discharge  none (P)      Discharge Plan discussed with: Patient (P)      Transition of Care Barriers None (P)

## 2025-03-12 NOTE — PLAN OF CARE
Medicare Message     Important Message from Medicare regarding Discharge Appeal Rights Explained to patient/caregiver; Signed/date by patient/caregiver   Date IMM was signed 3/11/2025   Time IMM was signed 2156

## 2025-03-12 NOTE — PLAN OF CARE
03/12/25 0910   Readmission   Was this a planned readmission? No   Why were you hospitalized in the last 30 days? Sickle cell crisis   Why were you readmitted? Related to previous admission   When you left the hospital how did you feel? I felt like I rushed myself to discharge and was not fully ready   When you left the hospital where did you go? Home with Family   Did patient/caregiver refused recommended DC plan? No   Tell me about what happened between when you left the hospital and the day you returned. Increased body pain   When did you start not feeling well? Two days prior to arrival   Did you try to manage your symptoms your self? Yes   What did you do? Take pain meds   Did you call anyone? No   Did you try to see or did see a doctor or nurse before you came? No   Did you have  a follow-up appointment on discharge? Yes   Did you go? No

## 2025-03-12 NOTE — PLAN OF CARE
Problem: Adult Inpatient Plan of Care  Goal: Plan of Care Review  Outcome: Progressing  Goal: Patient-Specific Goal (Individualized)  Outcome: Progressing  Goal: Absence of Hospital-Acquired Illness or Injury  Outcome: Progressing  Goal: Optimal Comfort and Wellbeing  Outcome: Progressing  Goal: Readiness for Transition of Care  Outcome: Progressing     Problem: Acute Kidney Injury/Impairment  Goal: Fluid and Electrolyte Balance  Outcome: Progressing  Goal: Improved Oral Intake  Outcome: Progressing     Problem: Fall Injury Risk  Goal: Absence of Fall and Fall-Related Injury  Outcome: Progressing     Pt alert and oriented. Afebrile. Oxygen maintained @ room air. Ambulatory. Tolerating diet. IV fluids continued. Telemetry monitoring maintained. Continued pain needs. Safety maintained.

## 2025-03-13 LAB
ANION GAP SERPL CALC-SCNC: 8 MMOL/L (ref 8–16)
BASOPHILS # BLD AUTO: 0.06 K/UL (ref 0–0.2)
BASOPHILS NFR BLD: 1 % (ref 0–1.9)
BUN SERPL-MCNC: 15 MG/DL (ref 6–20)
CALCIUM SERPL-MCNC: 9.2 MG/DL (ref 8.7–10.5)
CHLORIDE SERPL-SCNC: 107 MMOL/L (ref 95–110)
CO2 SERPL-SCNC: 25 MMOL/L (ref 23–29)
CREAT SERPL-MCNC: 1.1 MG/DL (ref 0.5–1.4)
DIFFERENTIAL METHOD BLD: ABNORMAL
EOSINOPHIL # BLD AUTO: 0.4 K/UL (ref 0–0.5)
EOSINOPHIL NFR BLD: 6.6 % (ref 0–8)
ERYTHROCYTE [DISTWIDTH] IN BLOOD BY AUTOMATED COUNT: 22.5 % (ref 11.5–14.5)
EST. GFR  (NO RACE VARIABLE): >60 ML/MIN/1.73 M^2
GLUCOSE SERPL-MCNC: 78 MG/DL (ref 70–110)
HCT VFR BLD AUTO: 25.5 % (ref 37–48.5)
HGB BLD-MCNC: 8.5 G/DL (ref 12–16)
IMM GRANULOCYTES # BLD AUTO: 0 K/UL (ref 0–0.04)
IMM GRANULOCYTES NFR BLD AUTO: 0 % (ref 0–0.5)
LYMPHOCYTES # BLD AUTO: 3.1 K/UL (ref 1–4.8)
LYMPHOCYTES NFR BLD: 53.2 % (ref 18–48)
MAGNESIUM SERPL-MCNC: 2.1 MG/DL (ref 1.6–2.6)
MCH RBC QN AUTO: 23.7 PG (ref 27–31)
MCHC RBC AUTO-ENTMCNC: 33.3 G/DL (ref 32–36)
MCV RBC AUTO: 71 FL (ref 82–98)
MONOCYTES # BLD AUTO: 0.8 K/UL (ref 0.3–1)
MONOCYTES NFR BLD: 13.2 % (ref 4–15)
NEUTROPHILS # BLD AUTO: 1.5 K/UL (ref 1.8–7.7)
NEUTROPHILS NFR BLD: 26 % (ref 38–73)
NRBC BLD-RTO: 1 /100 WBC
PHOSPHATE SERPL-MCNC: 3.6 MG/DL (ref 2.7–4.5)
PLATELET # BLD AUTO: 431 K/UL (ref 150–450)
PMV BLD AUTO: 9.3 FL (ref 9.2–12.9)
POTASSIUM SERPL-SCNC: 3.8 MMOL/L (ref 3.5–5.1)
RBC # BLD AUTO: 3.58 M/UL (ref 4–5.4)
SODIUM SERPL-SCNC: 140 MMOL/L (ref 136–145)
WBC # BLD AUTO: 5.75 K/UL (ref 3.9–12.7)

## 2025-03-13 PROCEDURE — 85025 COMPLETE CBC W/AUTO DIFF WBC: CPT | Performed by: HOSPITALIST

## 2025-03-13 PROCEDURE — 21400001 HC TELEMETRY ROOM

## 2025-03-13 PROCEDURE — 25000003 PHARM REV CODE 250: Performed by: HOSPITALIST

## 2025-03-13 PROCEDURE — A4216 STERILE WATER/SALINE, 10 ML: HCPCS | Performed by: HOSPITALIST

## 2025-03-13 PROCEDURE — 84100 ASSAY OF PHOSPHORUS: CPT | Performed by: HOSPITALIST

## 2025-03-13 PROCEDURE — 80048 BASIC METABOLIC PNL TOTAL CA: CPT | Performed by: HOSPITALIST

## 2025-03-13 PROCEDURE — 63600175 PHARM REV CODE 636 W HCPCS: Performed by: HOSPITALIST

## 2025-03-13 PROCEDURE — 83735 ASSAY OF MAGNESIUM: CPT | Performed by: HOSPITALIST

## 2025-03-13 RX ADMIN — DICYCLOMINE HYDROCHLORIDE 10 MG: 10 CAPSULE ORAL at 10:03

## 2025-03-13 RX ADMIN — MORPHINE SULFATE 8 MG: 4 INJECTION INTRAVENOUS at 04:03

## 2025-03-13 RX ADMIN — ALUMINUM HYDROXIDE, MAGNESIUM HYDROXIDE, AND DIMETHICONE 30 ML: 200; 20; 200 SUSPENSION ORAL at 04:03

## 2025-03-13 RX ADMIN — DICYCLOMINE HYDROCHLORIDE 10 MG: 10 CAPSULE ORAL at 04:03

## 2025-03-13 RX ADMIN — ALUMINUM HYDROXIDE, MAGNESIUM HYDROXIDE, AND DIMETHICONE 30 ML: 200; 20; 200 SUSPENSION ORAL at 10:03

## 2025-03-13 RX ADMIN — MORPHINE SULFATE 8 MG: 4 INJECTION INTRAVENOUS at 01:03

## 2025-03-13 RX ADMIN — ACETAMINOPHEN 1000 MG: 500 TABLET ORAL at 01:03

## 2025-03-13 RX ADMIN — DIPHENHYDRAMINE HYDROCHLORIDE 50 MG: 50 INJECTION INTRAMUSCULAR; INTRAVENOUS at 07:03

## 2025-03-13 RX ADMIN — ACETAMINOPHEN 1000 MG: 500 TABLET ORAL at 10:03

## 2025-03-13 RX ADMIN — MORPHINE SULFATE 8 MG: 4 INJECTION INTRAVENOUS at 10:03

## 2025-03-13 RX ADMIN — FOLIC ACID 1 MG: 1 TABLET ORAL at 09:03

## 2025-03-13 RX ADMIN — SUCRALFATE 1 G: 1 TABLET ORAL at 04:03

## 2025-03-13 RX ADMIN — MORPHINE SULFATE 30 MG: 15 TABLET, FILM COATED, EXTENDED RELEASE ORAL at 02:03

## 2025-03-13 RX ADMIN — AMLODIPINE BESYLATE 10 MG: 5 TABLET ORAL at 09:03

## 2025-03-13 RX ADMIN — METOCLOPRAMIDE 5 MG: 5 TABLET ORAL at 04:03

## 2025-03-13 RX ADMIN — LACTULOSE 20 G: 20 SOLUTION ORAL at 10:03

## 2025-03-13 RX ADMIN — APIXABAN 5 MG: 2.5 TABLET, FILM COATED ORAL at 10:03

## 2025-03-13 RX ADMIN — TIZANIDINE 4 MG: 2 TABLET ORAL at 01:03

## 2025-03-13 RX ADMIN — FLUOXETINE HYDROCHLORIDE 80 MG: 20 CAPSULE ORAL at 09:03

## 2025-03-13 RX ADMIN — MORPHINE SULFATE 30 MG: 15 TABLET, FILM COATED, EXTENDED RELEASE ORAL at 09:03

## 2025-03-13 RX ADMIN — DIPHENHYDRAMINE HYDROCHLORIDE 50 MG: 50 INJECTION INTRAMUSCULAR; INTRAVENOUS at 10:03

## 2025-03-13 RX ADMIN — MORPHINE SULFATE 8 MG: 4 INJECTION INTRAVENOUS at 12:03

## 2025-03-13 RX ADMIN — MORPHINE SULFATE 8 MG: 4 INJECTION INTRAVENOUS at 07:03

## 2025-03-13 RX ADMIN — SUCRALFATE 1 G: 1 TABLET ORAL at 10:03

## 2025-03-13 RX ADMIN — DIPHENHYDRAMINE HYDROCHLORIDE 50 MG: 50 INJECTION INTRAMUSCULAR; INTRAVENOUS at 04:03

## 2025-03-13 RX ADMIN — DIPHENHYDRAMINE HYDROCHLORIDE 50 MG: 50 INJECTION INTRAMUSCULAR; INTRAVENOUS at 12:03

## 2025-03-13 RX ADMIN — DIPHENHYDRAMINE HYDROCHLORIDE 50 MG: 50 INJECTION INTRAMUSCULAR; INTRAVENOUS at 01:03

## 2025-03-13 RX ADMIN — HYDROXYUREA 1000 MG: 500 CAPSULE ORAL at 09:03

## 2025-03-13 RX ADMIN — SODIUM CHLORIDE, POTASSIUM CHLORIDE, SODIUM LACTATE AND CALCIUM CHLORIDE: 600; 310; 30; 20 INJECTION, SOLUTION INTRAVENOUS at 07:03

## 2025-03-13 RX ADMIN — PANTOPRAZOLE SODIUM 40 MG: 40 TABLET, DELAYED RELEASE ORAL at 10:03

## 2025-03-13 RX ADMIN — METOCLOPRAMIDE 5 MG: 5 TABLET ORAL at 10:03

## 2025-03-13 RX ADMIN — PRAZOSIN HYDROCHLORIDE 1 MG: 1 CAPSULE ORAL at 10:03

## 2025-03-13 RX ADMIN — TIZANIDINE 4 MG: 2 TABLET ORAL at 07:03

## 2025-03-13 RX ADMIN — GABAPENTIN 600 MG: 300 CAPSULE ORAL at 02:03

## 2025-03-13 RX ADMIN — TIZANIDINE 4 MG: 2 TABLET ORAL at 10:03

## 2025-03-13 RX ADMIN — GABAPENTIN 600 MG: 300 CAPSULE ORAL at 09:03

## 2025-03-13 RX ADMIN — APIXABAN 5 MG: 2.5 TABLET, FILM COATED ORAL at 09:03

## 2025-03-13 RX ADMIN — SUCRALFATE 1 G: 1 TABLET ORAL at 07:03

## 2025-03-13 RX ADMIN — Medication 10 ML: at 10:03

## 2025-03-13 RX ADMIN — ALUMINUM HYDROXIDE, MAGNESIUM HYDROXIDE, AND DIMETHICONE 30 ML: 200; 20; 200 SUSPENSION ORAL at 07:03

## 2025-03-13 RX ADMIN — ACETAMINOPHEN 1000 MG: 500 TABLET ORAL at 07:03

## 2025-03-13 RX ADMIN — MORPHINE SULFATE 30 MG: 15 TABLET, FILM COATED, EXTENDED RELEASE ORAL at 10:03

## 2025-03-13 RX ADMIN — Medication 10 ML: at 07:03

## 2025-03-13 RX ADMIN — METOCLOPRAMIDE 5 MG: 5 TABLET ORAL at 07:03

## 2025-03-13 RX ADMIN — PANTOPRAZOLE SODIUM 40 MG: 40 TABLET, DELAYED RELEASE ORAL at 09:03

## 2025-03-13 RX ADMIN — DICYCLOMINE HYDROCHLORIDE 10 MG: 10 CAPSULE ORAL at 07:03

## 2025-03-13 RX ADMIN — MUPIROCIN: 20 OINTMENT TOPICAL at 09:03

## 2025-03-13 RX ADMIN — GABAPENTIN 600 MG: 300 CAPSULE ORAL at 10:03

## 2025-03-13 NOTE — PLAN OF CARE
Patient cooperative with care, VSS maintaining oxygenation on RA complaining of bilateral hip pain, PRN pain medications given upon request, voiding spontaneously, No concerns expressed to this nurse at this time     Problem: Adult Inpatient Plan of Care  Goal: Plan of Care Review  Outcome: Progressing  Goal: Patient-Specific Goal (Individualized)  Outcome: Progressing  Goal: Absence of Hospital-Acquired Illness or Injury  Outcome: Progressing  Goal: Optimal Comfort and Wellbeing  Outcome: Progressing  Goal: Readiness for Transition of Care  Outcome: Progressing     Problem: Infection  Goal: Absence of Infection Signs and Symptoms  Outcome: Progressing     Problem: Acute Kidney Injury/Impairment  Goal: Fluid and Electrolyte Balance  Outcome: Progressing  Goal: Improved Oral Intake  Outcome: Progressing  Goal: Effective Renal Function  Outcome: Progressing     Problem: Fall Injury Risk  Goal: Absence of Fall and Fall-Related Injury  Outcome: Progressing

## 2025-03-13 NOTE — PROGRESS NOTES
[facility] Surg [address] Medicine  Progress Note    Patient Name: Nazanin Malone  MRN: [id number]  Patient Class: IP- Inpatient   Admission Date: 3/11/2025  Length of Stay: 1 days  Attending Physician: Chasity Ritter MD  Primary Care Provider: Kezia, Primary Doctor        Subjective     Principal Problem:Sickle cell disease, type S beta-zero thalassemia with crisis        HPI:  44 y.o. 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 medications.  Patient reports when her pain flared up, she started to have nausea and vomiting and heartburn that cause her abdominal pain.  This is consistent with what happens when her pain crisis flare up which causes her chronic gastritis get worse despite being on Protonix and sucralfate.  Also reported nausea and vomiting, but no shortness of breath or chest pain.  No fevers or chills.  Pain typical of her pain crisis in the past, which have become more frequent.  Recently hospitalized at this facility from 2/26 - 3/2/2025 with same presentation..  In the ER, IV fluids and multiple IV pushes of pain medications were given without relief.  Workup remarkable for hemoglobin 9.8, normal retic count of 2.1.  Chest x-ray and bilateral hip films were unremarkable for any acute abnormalities.    Overview/Hospital Course:  No notes on file    Interval History: No acute events, report pain improving in back/hips but more itching.    Review of Systems   Constitutional:  Negative for chills and fever.   Respiratory:  Negative for shortness of breath.    Musculoskeletal:  Positive for arthralgias and back pain.     Objective:     Vital Signs (Most Recent):  Temp: 98.6 °F (37 °C) (03/12/25 2112)  Pulse: 83 (03/12/25 2112)  Resp: 17 (03/12/25 2112)  BP: 134/76 (03/12/25 2112)  SpO2: 95 %  (03/12/25 2112) Vital Signs (24h Range):  Temp:  [98 °F (36.7 °C)-99 °F (37.2 °C)] 98.6 °F (37 °C)  Pulse:  [72-85] 83  Resp:  [16-18] 17  SpO2:  [93 %-98 %] 95 %  BP: (113-135)/(60-78) 134/76     Weight: 94.3 kg (208 lb)  Body mass index is 38.04 kg/m².    Intake/Output Summary (Last 24 hours) at 3/12/2025 2152  Last data filed at 3/12/2025 1835  Gross per 24 hour   Intake 2465.44 ml   Output no documentation   Net 2465.44 ml         Physical Exam  Vitals and nursing note reviewed.   Constitutional:       General: She is not in acute distress.  HENT:      Head: Normocephalic and atraumatic.      Nose: Nose normal.   Eyes:      Extraocular Movements: Extraocular movements intact.      Conjunctiva/sclera: Conjunctivae normal.   Cardiovascular:      Rate and Rhythm: Normal rate and regular rhythm.      Pulses: Normal pulses.   Pulmonary:      Effort: Pulmonary effort is normal. No respiratory distress.      Breath sounds: Normal breath sounds. No wheezing.   Abdominal:      General: Bowel sounds are normal. There is no distension.      Palpations: Abdomen is soft.      Tenderness: There is no abdominal tenderness. There is no guarding or rebound.   Musculoskeletal:      Cervical back: Normal range of motion.      Right lower leg: No edema.      Left lower leg: No edema.   Skin:     General: Skin is warm.      Comments: Right leg old scar    Neurological:      General: No focal deficit present.      Mental Status: She is alert and oriented to person, place, and time.   Psychiatric:         Mood and Affect: Mood normal.         Behavior: Behavior normal.         Thought Content: Thought content normal.               Significant Labs: All pertinent labs within the past 24 hours have been reviewed.    Significant Imaging: I have reviewed all pertinent imaging results/findings within the past 24 hours.      Assessment & Plan  Sickle cell disease, type S beta-zero thalassemia with crisis  Chronic gastritis  Chronic  prescription opiate use  - Patient presented with typical sickle cell pain crisis with recent hospitalization for same presentation  - Continue IV fluids  - Continue chronic opioid therapy MS Contin 30 mg p.o. t.i.d., hydrocodone-acetaminophen  mg p.o. q.4 hours p.r.n. for moderate pain, nand have morphine 8 mg IV q.3 hours p.r.n. for severe pain  - Supportive care with antiemetics, diphenhydramine IV p.r.n.  - Continue home regimen for gastritis with Protonix 40 mg p.o. b.i.d., Carafate 1 g p.o. q.6 hours, Reglan 5 mg p.o. q.a.c.  - Continue folic acid 1 mg p.o. daily and hydroxyurea 1000 mg p.o. daily  - Trend with labs and correct electrolyte abnormalities as needed  - Clinically improving, possible discharge in 1-2 days  Hypertension  - Continue amlodipine 10 mg p.o. daily and prazosin 1 mg p.o. q.h.s.  Anxiety and depression  Trigeminal neuralgia  - Continue gabapentin 600 mg p.o. t.i.d., fluoxetine 80 mg p.o. daily,    Hx pulmonary embolism  Chronic anticoagulation  - Patient was abuse be on full-dose Lovenox versus apixaban; not on any due to inability to afford medications  - Continue apixaban for now discuss patient's preference and ability to afford    VTE Risk Mitigation (From admission, onward)           Ordered     apixaban tablet 5 mg  2 times daily         03/11/25 1822     IP VTE HIGH RISK PATIENT  Once         03/11/25 1259     Place sequential compression device  Until discontinued         03/11/25 1259                      Chasity Ritter MD  Department of Hospital Medicine   [facility] - Med Surg ([address])

## 2025-03-13 NOTE — SUBJECTIVE & OBJECTIVE
Interval History: No acute events, report pain improving in back/hips but more itching.    Review of Systems   Constitutional:  Negative for chills and fever.   Respiratory:  Negative for shortness of breath.    Musculoskeletal:  Positive for arthralgias and back pain.     Objective:     Vital Signs (Most Recent):  Temp: 98.6 °F (37 °C) (03/12/25 2112)  Pulse: 83 (03/12/25 2112)  Resp: 17 (03/12/25 2112)  BP: 134/76 (03/12/25 2112)  SpO2: 95 % (03/12/25 2112) Vital Signs (24h Range):  Temp:  [98 °F (36.7 °C)-99 °F (37.2 °C)] 98.6 °F (37 °C)  Pulse:  [72-85] 83  Resp:  [16-18] 17  SpO2:  [93 %-98 %] 95 %  BP: (113-135)/(60-78) 134/76     Weight: 94.3 kg (208 lb)  Body mass index is 38.04 kg/m².    Intake/Output Summary (Last 24 hours) at 3/12/2025 2152  Last data filed at 3/12/2025 1835  Gross per 24 hour   Intake 2465.44 ml   Output no documentation   Net 2465.44 ml         Physical Exam  Vitals and nursing note reviewed.   Constitutional:       General: She is not in acute distress.  HENT:      Head: Normocephalic and atraumatic.      Nose: Nose normal.   Eyes:      Extraocular Movements: Extraocular movements intact.      Conjunctiva/sclera: Conjunctivae normal.   Cardiovascular:      Rate and Rhythm: Normal rate and regular rhythm.      Pulses: Normal pulses.   Pulmonary:      Effort: Pulmonary effort is normal. No respiratory distress.      Breath sounds: Normal breath sounds. No wheezing.   Abdominal:      General: Bowel sounds are normal. There is no distension.      Palpations: Abdomen is soft.      Tenderness: There is no abdominal tenderness. There is no guarding or rebound.   Musculoskeletal:      Cervical back: Normal range of motion.      Right lower leg: No edema.      Left lower leg: No edema.   Skin:     General: Skin is warm.      Comments: Right leg old scar    Neurological:      General: No focal deficit present.      Mental Status: She is alert and oriented to person, place, and time.    Psychiatric:         Mood and Affect: Mood normal.         Behavior: Behavior normal.         Thought Content: Thought content normal.               Significant Labs: All pertinent labs within the past 24 hours have been reviewed.    Significant Imaging: I have reviewed all pertinent imaging results/findings within the past 24 hours.

## 2025-03-13 NOTE — ASSESSMENT & PLAN NOTE
- Patient presented with typical sickle cell pain crisis with recent hospitalization for same presentation  - Continue IV fluids  - Continue chronic opioid therapy MS Contin 30 mg p.o. t.i.d., hydrocodone-acetaminophen  mg p.o. q.4 hours p.r.n. for moderate pain, nand have morphine 8 mg IV q.3 hours p.r.n. for severe pain  - Supportive care with antiemetics, diphenhydramine IV p.r.n.  - Continue home regimen for gastritis with Protonix 40 mg p.o. b.i.d., Carafate 1 g p.o. q.6 hours, Reglan 5 mg p.o. q.a.c.  - Continue folic acid 1 mg p.o. daily and hydroxyurea 1000 mg p.o. daily  - Trend with labs and correct electrolyte abnormalities as needed  - Clinically improving, possible discharge in 1-2 days

## 2025-03-13 NOTE — ASSESSMENT & PLAN NOTE
- Patient was abuse be on full-dose Lovenox versus apixaban; not on any due to inability to afford medications  - Continue apixaban for now discuss patient's preference and ability to afford

## 2025-03-14 ENCOUNTER — TELEPHONE (OUTPATIENT)
Dept: HEMATOLOGY/ONCOLOGY | Facility: CLINIC | Age: 45
End: 2025-03-14
Payer: MEDICARE

## 2025-03-14 VITALS
WEIGHT: 208 LBS | SYSTOLIC BLOOD PRESSURE: 120 MMHG | OXYGEN SATURATION: 96 % | BODY MASS INDEX: 38.28 KG/M2 | TEMPERATURE: 99 F | HEIGHT: 62 IN | HEART RATE: 86 BPM | DIASTOLIC BLOOD PRESSURE: 68 MMHG | RESPIRATION RATE: 18 BRPM

## 2025-03-14 DIAGNOSIS — D57.00 HB-SS DISEASE WITH VASO-OCCLUSIVE PAIN: ICD-10-CM

## 2025-03-14 DIAGNOSIS — D57.00 SICKLE CELL PAIN CRISIS: ICD-10-CM

## 2025-03-14 LAB
ANION GAP SERPL CALC-SCNC: 8 MMOL/L (ref 8–16)
BASOPHILS # BLD AUTO: 0.06 K/UL (ref 0–0.2)
BASOPHILS NFR BLD: 1 % (ref 0–1.9)
BILIRUB UR QL STRIP: NEGATIVE
BUN SERPL-MCNC: 17 MG/DL (ref 6–20)
CALCIUM SERPL-MCNC: 9 MG/DL (ref 8.7–10.5)
CHLORIDE SERPL-SCNC: 106 MMOL/L (ref 95–110)
CLARITY UR: CLEAR
CO2 SERPL-SCNC: 26 MMOL/L (ref 23–29)
COLOR UR: COLORLESS
CREAT SERPL-MCNC: 1.3 MG/DL (ref 0.5–1.4)
DIFFERENTIAL METHOD BLD: ABNORMAL
EOSINOPHIL # BLD AUTO: 0.6 K/UL (ref 0–0.5)
EOSINOPHIL NFR BLD: 9 % (ref 0–8)
ERYTHROCYTE [DISTWIDTH] IN BLOOD BY AUTOMATED COUNT: 22.5 % (ref 11.5–14.5)
EST. GFR  (NO RACE VARIABLE): 51 ML/MIN/1.73 M^2
GLUCOSE SERPL-MCNC: 78 MG/DL (ref 70–110)
GLUCOSE UR QL STRIP: NEGATIVE
HCT VFR BLD AUTO: 24.1 % (ref 37–48.5)
HGB BLD-MCNC: 8.1 G/DL (ref 12–16)
HGB UR QL STRIP: NEGATIVE
IMM GRANULOCYTES # BLD AUTO: 0.01 K/UL (ref 0–0.04)
IMM GRANULOCYTES NFR BLD AUTO: 0.2 % (ref 0–0.5)
KETONES UR QL STRIP: NEGATIVE
LEUKOCYTE ESTERASE UR QL STRIP: NEGATIVE
LYMPHOCYTES # BLD AUTO: 2.7 K/UL (ref 1–4.8)
LYMPHOCYTES NFR BLD: 44.1 % (ref 18–48)
MAGNESIUM SERPL-MCNC: 2.1 MG/DL (ref 1.6–2.6)
MCH RBC QN AUTO: 24 PG (ref 27–31)
MCHC RBC AUTO-ENTMCNC: 33.6 G/DL (ref 32–36)
MCV RBC AUTO: 72 FL (ref 82–98)
MONOCYTES # BLD AUTO: 0.8 K/UL (ref 0.3–1)
MONOCYTES NFR BLD: 13 % (ref 4–15)
NEUTROPHILS # BLD AUTO: 2 K/UL (ref 1.8–7.7)
NEUTROPHILS NFR BLD: 32.7 % (ref 38–73)
NITRITE UR QL STRIP: NEGATIVE
NRBC BLD-RTO: 1 /100 WBC
PH UR STRIP: 6 [PH] (ref 5–8)
PHOSPHATE SERPL-MCNC: 3.1 MG/DL (ref 2.7–4.5)
PLATELET # BLD AUTO: 436 K/UL (ref 150–450)
PMV BLD AUTO: 9.1 FL (ref 9.2–12.9)
POTASSIUM SERPL-SCNC: 4.1 MMOL/L (ref 3.5–5.1)
PROT UR QL STRIP: NEGATIVE
RBC # BLD AUTO: 3.37 M/UL (ref 4–5.4)
SODIUM SERPL-SCNC: 140 MMOL/L (ref 136–145)
SP GR UR STRIP: 1.01 (ref 1–1.03)
URN SPEC COLLECT METH UR: ABNORMAL
UROBILINOGEN UR STRIP-ACNC: NEGATIVE EU/DL
WBC # BLD AUTO: 6.21 K/UL (ref 3.9–12.7)

## 2025-03-14 PROCEDURE — 80048 BASIC METABOLIC PNL TOTAL CA: CPT | Performed by: HOSPITALIST

## 2025-03-14 PROCEDURE — 25000003 PHARM REV CODE 250: Performed by: HOSPITALIST

## 2025-03-14 PROCEDURE — 83735 ASSAY OF MAGNESIUM: CPT | Performed by: HOSPITALIST

## 2025-03-14 PROCEDURE — 81003 URINALYSIS AUTO W/O SCOPE: CPT | Performed by: HOSPITALIST

## 2025-03-14 PROCEDURE — 85025 COMPLETE CBC W/AUTO DIFF WBC: CPT | Performed by: HOSPITALIST

## 2025-03-14 PROCEDURE — 63600175 PHARM REV CODE 636 W HCPCS: Performed by: HOSPITALIST

## 2025-03-14 PROCEDURE — 84100 ASSAY OF PHOSPHORUS: CPT | Performed by: HOSPITALIST

## 2025-03-14 RX ORDER — HEPARIN 100 UNIT/ML
10 SYRINGE INTRAVENOUS ONCE
Status: COMPLETED | OUTPATIENT
Start: 2025-03-14 | End: 2025-03-14

## 2025-03-14 RX ORDER — HYDROCODONE BITARTRATE AND ACETAMINOPHEN 10; 325 MG/1; MG/1
1 TABLET ORAL
Qty: 120 TABLET | Refills: 0 | Status: SHIPPED | OUTPATIENT
Start: 2025-03-14

## 2025-03-14 RX ORDER — TIZANIDINE 4 MG/1
4 TABLET ORAL EVERY 8 HOURS
Qty: 30 TABLET | Refills: 0 | Status: SHIPPED | OUTPATIENT
Start: 2025-03-14 | End: 2025-03-24

## 2025-03-14 RX ADMIN — DICYCLOMINE HYDROCHLORIDE 10 MG: 10 CAPSULE ORAL at 11:03

## 2025-03-14 RX ADMIN — PANTOPRAZOLE SODIUM 40 MG: 40 TABLET, DELAYED RELEASE ORAL at 08:03

## 2025-03-14 RX ADMIN — DICYCLOMINE HYDROCHLORIDE 10 MG: 10 CAPSULE ORAL at 05:03

## 2025-03-14 RX ADMIN — DIPHENHYDRAMINE HYDROCHLORIDE 50 MG: 50 INJECTION INTRAMUSCULAR; INTRAVENOUS at 11:03

## 2025-03-14 RX ADMIN — DIPHENHYDRAMINE HYDROCHLORIDE 50 MG: 50 INJECTION INTRAMUSCULAR; INTRAVENOUS at 02:03

## 2025-03-14 RX ADMIN — FLUOXETINE HYDROCHLORIDE 80 MG: 20 CAPSULE ORAL at 08:03

## 2025-03-14 RX ADMIN — SUCRALFATE 1 G: 1 TABLET ORAL at 05:03

## 2025-03-14 RX ADMIN — HEPARIN 1000 UNITS: 100 SYRINGE at 02:03

## 2025-03-14 RX ADMIN — MORPHINE SULFATE 8 MG: 4 INJECTION INTRAVENOUS at 05:03

## 2025-03-14 RX ADMIN — APIXABAN 5 MG: 2.5 TABLET, FILM COATED ORAL at 08:03

## 2025-03-14 RX ADMIN — MORPHINE SULFATE 8 MG: 4 INJECTION INTRAVENOUS at 02:03

## 2025-03-14 RX ADMIN — DIPHENHYDRAMINE HYDROCHLORIDE 50 MG: 50 INJECTION INTRAMUSCULAR; INTRAVENOUS at 05:03

## 2025-03-14 RX ADMIN — DIPHENHYDRAMINE HYDROCHLORIDE 50 MG: 50 INJECTION INTRAMUSCULAR; INTRAVENOUS at 08:03

## 2025-03-14 RX ADMIN — GABAPENTIN 600 MG: 300 CAPSULE ORAL at 08:03

## 2025-03-14 RX ADMIN — TIZANIDINE 4 MG: 2 TABLET ORAL at 05:03

## 2025-03-14 RX ADMIN — ACETAMINOPHEN 1000 MG: 500 TABLET ORAL at 05:03

## 2025-03-14 RX ADMIN — MORPHINE SULFATE 30 MG: 15 TABLET, FILM COATED, EXTENDED RELEASE ORAL at 08:03

## 2025-03-14 RX ADMIN — ALUMINUM HYDROXIDE, MAGNESIUM HYDROXIDE, AND DIMETHICONE 30 ML: 200; 20; 200 SUSPENSION ORAL at 05:03

## 2025-03-14 RX ADMIN — AMLODIPINE BESYLATE 10 MG: 5 TABLET ORAL at 08:03

## 2025-03-14 RX ADMIN — MORPHINE SULFATE 8 MG: 4 INJECTION INTRAVENOUS at 11:03

## 2025-03-14 RX ADMIN — METOCLOPRAMIDE 5 MG: 5 TABLET ORAL at 05:03

## 2025-03-14 RX ADMIN — MORPHINE SULFATE 8 MG: 4 INJECTION INTRAVENOUS at 08:03

## 2025-03-14 RX ADMIN — HYDROXYUREA 1000 MG: 500 CAPSULE ORAL at 08:03

## 2025-03-14 RX ADMIN — FOLIC ACID 1 MG: 1 TABLET ORAL at 08:03

## 2025-03-14 NOTE — PROGRESS NOTES
[facility] Surg [address] Medicine  Progress Note    Patient Name: Nazanin Malone  MRN: [id number]  Patient Class: IP- Inpatient   Admission Date: 3/11/2025  Length of Stay: 2 days  Attending Physician: Chasity Ritter MD  Primary Care Provider: Kezia, Primary Doctor        Subjective     Principal Problem:Sickle cell disease, type S beta-zero thalassemia with crisis        HPI:  44 y.o. 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 medications.  Patient reports when her pain flared up, she started to have nausea and vomiting and heartburn that cause her abdominal pain.  This is consistent with what happens when her pain crisis flare up which causes her chronic gastritis get worse despite being on Protonix and sucralfate.  Also reported nausea and vomiting, but no shortness of breath or chest pain.  No fevers or chills.  Pain typical of her pain crisis in the past, which have become more frequent.  Recently hospitalized at this facility from 2/26 - 3/2/2025 with same presentation..  In the ER, IV fluids and multiple IV pushes of pain medications were given without relief.  Workup remarkable for hemoglobin 9.8, normal retic count of 2.1.  Chest x-ray and bilateral hip films were unremarkable for any acute abnormalities.        Interval History: No acute events, report pain improving in back/hips. Better than yesterday but not yet able to be managed at home.    Review of Systems   Constitutional:  Negative for chills and fever.   Respiratory:  Negative for shortness of breath.    Musculoskeletal:  Positive for arthralgias and back pain.     Objective:     Vital Signs (Most Recent):  Temp: 98.9 °F (37.2 °C) (03/13/25 2030)  Pulse: 79 (03/13/25 2030)  Resp: 16 (03/13/25 2030)  BP: 134/74 (03/13/25 2030)  SpO2: 96 %  (03/13/25 2030) Vital Signs (24h Range):  Temp:  [98.3 °F (36.8 °C)-98.9 °F (37.2 °C)] 98.9 °F (37.2 °C)  Pulse:  [67-91] 79  Resp:  [15-18] 16  SpO2:  [94 %-98 %] 96 %  BP: (119-134)/(69-81) 134/74     Weight: 94.3 kg (208 lb)  Body mass index is 38.04 kg/m².    Intake/Output Summary (Last 24 hours) at 3/13/2025 2205  Last data filed at 3/13/2025 1826  Gross per 24 hour   Intake 2346.57 ml   Output no documentation   Net 2346.57 ml         Physical Exam  Vitals and nursing note reviewed.   Constitutional:       General: She is not in acute distress.  HENT:      Head: Normocephalic and atraumatic.      Nose: Nose normal.   Eyes:      Extraocular Movements: Extraocular movements intact.      Conjunctiva/sclera: Conjunctivae normal.   Cardiovascular:      Rate and Rhythm: Normal rate and regular rhythm.      Pulses: Normal pulses.   Pulmonary:      Effort: Pulmonary effort is normal. No respiratory distress.      Breath sounds: Normal breath sounds. No wheezing.   Abdominal:      General: Bowel sounds are normal. There is no distension.      Palpations: Abdomen is soft.      Tenderness: There is no abdominal tenderness. There is no guarding or rebound.   Musculoskeletal:      Cervical back: Normal range of motion.      Right lower leg: No edema.      Left lower leg: No edema.   Skin:     General: Skin is warm.      Comments: Right leg old scar    Neurological:      General: No focal deficit present.      Mental Status: She is alert and oriented to person, place, and time.   Psychiatric:         Mood and Affect: Mood normal.         Behavior: Behavior normal.         Thought Content: Thought content normal.               Significant Labs: All pertinent labs within the past 24 hours have been reviewed.    Significant Imaging: I have reviewed all pertinent imaging results/findings within the past 24 hours.      Assessment & Plan  Sickle cell disease, type S beta-zero thalassemia with crisis  Chronic gastritis  Chronic  prescription opiate use  - Patient presented with typical sickle cell pain crisis with recent hospitalization for same presentation  - Continue IV fluids  - Continue chronic opioid therapy MS Contin 30 mg p.o. t.i.d., hydrocodone-acetaminophen  mg p.o. q.4 hours p.r.n. for moderate pain, nand have morphine 8 mg IV q.3 hours p.r.n. for severe pain  - Supportive care with antiemetics, diphenhydramine IV p.r.n.  - Continue home regimen for gastritis with Protonix 40 mg p.o. b.i.d., Carafate 1 g p.o. q.6 hours, Reglan 5 mg p.o. q.a.c.  - Continue folic acid 1 mg p.o. daily and hydroxyurea 1000 mg p.o. daily  - Trend with labs and correct electrolyte abnormalities as needed  - Clinically improving, possible discharge in 1-2 days  Hypertension  - Continue amlodipine 10 mg p.o. daily and prazosin 1 mg p.o. q.h.s.  Anxiety and depression  Trigeminal neuralgia  - Continue gabapentin 600 mg p.o. t.i.d., fluoxetine 80 mg p.o. daily,    Hx pulmonary embolism  Chronic anticoagulation  - Patient was abuse be on full-dose Lovenox versus apixaban; not on any due to inability to afford medications  - Continue apixaban for now discuss patient's preference and ability to afford    VTE Risk Mitigation (From admission, onward)           Ordered     apixaban tablet 5 mg  2 times daily         03/11/25 1822     IP VTE HIGH RISK PATIENT  Once         03/11/25 1259     Place sequential compression device  Until discontinued         03/11/25 1259                      Chasity Ritter MD  Department of Hospital Medicine   [facility], [place] - Med Surg ([address])

## 2025-03-14 NOTE — PLAN OF CARE
Case Management Final Discharge Note    Discharge Disposition: Home    New DME ordered / company name: None    Relevant SDOH / Transition of Care Barriers:  None    Person available to provide assistance at home when needed and their contact information: Self    Scheduled followup appointment: PCP    Referrals placed: None    Transportation: Family        Patient educated on discharge services and updated on DC plan. Bedside RN notified, patient clear to discharge from Case Management Perspective.      03/14/25 1143   Final Note   Assessment Type Final Discharge Note   Anticipated Discharge Disposition Home   Hospital Resources/Appts/Education Provided Provided patient/caregiver with written discharge plan information;Appointments scheduled and added to AVS   Post-Acute Status   Discharge Delays None known at this time     [facility] - Med Surg ([address])  Discharge Final Note    Primary Care Provider: No, Primary Doctor    Expected Discharge Date: 3/14/2025    Final Discharge Note (most recent)       Final Note - 03/14/25 1143          Final Note    Assessment Type Final Discharge Note (P)      Anticipated Discharge Disposition Home or Self Care (P)      Hospital Resources/Appts/Education Provided Provided patient/caregiver with written discharge plan information;Appointments scheduled and added to AVS (P)         Post-Acute Status    Discharge Delays None known at this time (P)                      Important Message from Medicare  Important Message from Medicare regarding Discharge Appeal Rights: Explained to patient/caregiver, Signed/date by patient/caregiver     Date IMM was signed: 03/11/25  Time IMM was signed: 2156    Contact Info       [facility]    [address] [place] [address]   Phone: [phone]       Next Steps: Go on 3/19/2025    Instructions: 10am

## 2025-03-14 NOTE — TELEPHONE ENCOUNTER
"----- Message from Jesse sent at 3/14/2025  1:57 PM CDT -----  RX Name and Strength: tiZANidine (ZANAFLEX) 4 MG tablet HYDROcodone-acetaminophen (NORCO)  mg per tablet How is the patient currently taking it?  Take 1 tablet (4 mg total) by mouth every 8 (eight) hours. for 10 days - OralTake 1 tablet by mouth every 4 (four) hours as needed for Pain. - OralIs this a 30 day or 90 day Rx? 30 aidezd535 tabletPreferred Pharmacy with phone number: [facility] [facility] [address]Hycri: [phone] Fax: [phone] or Mail Order:  LocalOrdering Provider:  Venkatesh Preference: [phone] Information: Requesting to have refilled urgently within the next hour because she's about to be discharged"Thank you for all that you do for our patients"  "

## 2025-03-14 NOTE — SUBJECTIVE & OBJECTIVE
Interval History: No acute events, report pain improving in back/hips. Better than yesterday but not yet able to be managed at home.    Review of Systems   Constitutional:  Negative for chills and fever.   Respiratory:  Negative for shortness of breath.    Musculoskeletal:  Positive for arthralgias and back pain.     Objective:     Vital Signs (Most Recent):  Temp: 98.9 °F (37.2 °C) (03/13/25 2030)  Pulse: 79 (03/13/25 2030)  Resp: 16 (03/13/25 2030)  BP: 134/74 (03/13/25 2030)  SpO2: 96 % (03/13/25 2030) Vital Signs (24h Range):  Temp:  [98.3 °F (36.8 °C)-98.9 °F (37.2 °C)] 98.9 °F (37.2 °C)  Pulse:  [67-91] 79  Resp:  [15-18] 16  SpO2:  [94 %-98 %] 96 %  BP: (119-134)/(69-81) 134/74     Weight: 94.3 kg (208 lb)  Body mass index is 38.04 kg/m².    Intake/Output Summary (Last 24 hours) at 3/13/2025 2205  Last data filed at 3/13/2025 1826  Gross per 24 hour   Intake 2346.57 ml   Output no documentation   Net 2346.57 ml         Physical Exam  Vitals and nursing note reviewed.   Constitutional:       General: She is not in acute distress.  HENT:      Head: Normocephalic and atraumatic.      Nose: Nose normal.   Eyes:      Extraocular Movements: Extraocular movements intact.      Conjunctiva/sclera: Conjunctivae normal.   Cardiovascular:      Rate and Rhythm: Normal rate and regular rhythm.      Pulses: Normal pulses.   Pulmonary:      Effort: Pulmonary effort is normal. No respiratory distress.      Breath sounds: Normal breath sounds. No wheezing.   Abdominal:      General: Bowel sounds are normal. There is no distension.      Palpations: Abdomen is soft.      Tenderness: There is no abdominal tenderness. There is no guarding or rebound.   Musculoskeletal:      Cervical back: Normal range of motion.      Right lower leg: No edema.      Left lower leg: No edema.   Skin:     General: Skin is warm.      Comments: Right leg old scar    Neurological:      General: No focal deficit present.      Mental Status: She is alert  and oriented to person, place, and time.   Psychiatric:         Mood and Affect: Mood normal.         Behavior: Behavior normal.         Thought Content: Thought content normal.               Significant Labs: All pertinent labs within the past 24 hours have been reviewed.    Significant Imaging: I have reviewed all pertinent imaging results/findings within the past 24 hours.

## 2025-03-17 ENCOUNTER — PATIENT OUTREACH (OUTPATIENT)
Dept: ADMINISTRATIVE | Facility: CLINIC | Age: 45
End: 2025-03-17
Payer: MEDICARE

## 2025-03-17 NOTE — HOSPITAL COURSE
Ms. Malone presented with hip/back pain. Admitted with sickle cell pain crisis. Treatment initiated with IV fluids, pain control and supportive care. Monitored with labs. H/H stayed stable. Pain slowly improved to baseline levels that she could manage at home with her chronic pain medication regimen. She was discharged with follow with her PCP and Hematologist.

## 2025-03-17 NOTE — DISCHARGE SUMMARY
[facility] Surg ([address] Medicine  Discharge Summary      Patient Name: Nazanin Malone  MRN: [id number]  [facility]: [id number]  Patient Class: IP- Inpatient  Admission Date: 3/11/2025  Hospital Length of Stay: 3 days  Discharge Date and Time: 3/14/2025  3:03 PM  Attending Physician: Chasity Ritter MD  Discharging Provider: Chasity Ritter MD  Primary Care Provider: Kezia, Primary Doctor    Primary Care Team: Networked reference to record PCT     HPI:   44 y.o. 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 medications.  Patient reports when her pain flared up, she started to have nausea and vomiting and heartburn that cause her abdominal pain.  This is consistent with what happens when her pain crisis flare up which causes her chronic gastritis get worse despite being on Protonix and sucralfate.  Also reported nausea and vomiting, but no shortness of breath or chest pain.  No fevers or chills.  Pain typical of her pain crisis in the past, which have become more frequent.  Recently hospitalized at this facility from 2/26 - 3/2/2025 with same presentation..  In the ER, IV fluids and multiple IV pushes of pain medications were given without relief.  Workup remarkable for hemoglobin 9.8, normal retic count of 2.1.  Chest x-ray and bilateral hip films were unremarkable for any acute abnormalities.    * No surgery found *      Hospital Course:   Ms. Malone presented with hip/back pain. Admitted with sickle cell pain crisis. Treatment initiated with IV fluids, pain control and supportive care. Monitored with labs. H/H stayed stable. Pain slowly improved to baseline levels that she could manage at home with her chronic pain medication regimen. She was discharged with follow with her PCP and Hematologist.     Goals of Care  Treatment Preferences:  Code Status: Full Code    Consults: None    Final Active Diagnoses:    Diagnosis Date Noted POA    PRINCIPAL PROBLEM:  Sickle cell disease, type S beta-zero thalassemia with crisis [D57.439] 04/26/2019 Yes    Chronic prescription opiate use [Z79.891] 12/31/2024 Not Applicable     Chronic    Hx pulmonary embolism [Z86.711] 12/30/2024 Yes    Chronic gastritis [K29.50] 08/22/2024 Yes     Chronic    Chronic anticoagulation [Z79.01] 08/16/2024 Not Applicable     Chronic    History of pulmonary embolism [Z86.711] 06/08/2020 Yes     Chronic    Hb-SS disease with vaso-occlusive pain [D57.00] 07/30/2019 Yes    Anxiety and depression [F41.9, F32.A] 03/20/2018 Yes     Chronic    Trigeminal neuralgia [G50.0] 03/20/2018 Yes    Hypertension [I10] 04/16/2017 Yes     Chronic      Problems Resolved During this Admission:       Discharged Condition: good    Disposition: Home or Self Care    Follow Up:   Follow-up Information       [facility] on 3/19/2025.    Why: 10am  Contact information:  [address] [place] [address]  [phone]                           Patient Instructions:      Diet Adult Regular     Activity as tolerated       Significant Diagnostic Studies: N/A    Pending Diagnostic Studies:       None           Medications:  Reconciled Home Medications:      Medication List        Continue taking these medications      acetaminophen 325 MG tablet  Commonly known as: TYLENOL  Take 2 tablets (650 mg total) by mouth every 8 (eight) hours as needed for Pain.     albuterol 90 mcg/actuation inhaler  Commonly known as: VENTOLIN HFA  Inhale 2 puffs into the lungs every 6 (six) hours as needed for Wheezing or Shortness of Breath. Rescue     amLODIPine 10 MG tablet  Commonly known as: NORVASC  Take 1 tablet (10 mg total) by mouth once daily.     enoxaparin 40 mg/0.4 mL Syrg  Commonly known as: LOVENOX  Inject 0.4 mLs (40 mg total) into the skin every 12 (twelve) hours.      FLUoxetine 40 MG capsule  Take 2 capsules (80 mg total) by mouth once daily.     fluticasone propionate 50 mcg/actuation nasal spray  Commonly known as: FLONASE  INSTILL 1 SPRAY INTO EACH NOSTRIL EVERY DAY     folic acid 1 MG tablet  Commonly known as: FOLVITE  Take 1 tablet (1 mg total) by mouth once daily.     gabapentin 300 MG capsule  Commonly known as: NEURONTIN  Take 2 capsules (600 mg total) by mouth 3 (three) times daily.     hydroxyurea 500 mg Cap  Commonly known as: HYDREA  Take 2 capsules (1,000 mg total) by mouth once daily.     metoclopramide HCl 5 MG tablet  Commonly known as: REGLAN  Take 5 mg by mouth 4 (four) times daily.     morphine 30 MG 12 hr tablet  Commonly known as: MS CONTIN  Take 1 tablet (30 mg total) by mouth 3 (three) times daily.     naloxone 4 mg/actuation Spry  Commonly known as: NARCAN  4mg by nasal route as needed for opioid overdose; may repeat every 2-3 minutes in alternating nostrils until medical help arrives. Call 911     pantoprazole 40 MG tablet  Commonly known as: PROTONIX  Take 1 tablet (40 mg total) by mouth 2 (two) times daily.     prazosin 1 MG Cap  Commonly known as: MINIPRESS  Take 1 capsule (1 mg total) by mouth every evening.     senna-docusate 8.6-50 mg 8.6-50 mg per tablet  Commonly known as: PERICOLACE  Take 1 tablet by mouth daily as needed for Constipation.     sucralfate 1 gram tablet  Commonly known as: CARAFATE  Take 1 tablet (1 g total) by mouth 4 (four) times daily before meals and nightly.     VITAMIN C ORAL  Take 1 capsule by mouth 2 (two) times a day.            Ask your doctor about these medications      dicyclomine 10 MG capsule  Commonly known as: BENTYL  Take 1 capsule (10 mg total) by mouth 4 (four) times daily before meals and nightly.  Ask about: Should I take this medication?              Indwelling Lines/Drains at time of discharge:   Lines/Drains/Airways       None                   Time spent on the discharge of patient: 35 minutes          Chasity Ritter MD  Department of Hospital Medicine  [facility] [place] - [facility] Surg ([address])

## 2025-03-17 NOTE — PROGRESS NOTES
C3 nurse attempted to contact Nazanin Malone  for a TCC post hospital discharge follow up call. No answer. Left voicemail with callback information. The patient has a scheduled [facility] appointment with [facility] on 3/19/25 @ 1000.

## 2025-03-18 NOTE — PROGRESS NOTES
C3 nurse spoke with Nazanin Malone  for a TCC post hospital discharge follow up call. The patient has a scheduled appointment with [facility] on 3/19/25 @ 1000.

## 2025-03-22 ENCOUNTER — HOSPITAL ENCOUNTER (EMERGENCY)
Facility: OTHER | Age: 45
Discharge: LEFT BEFORE DISCHARGE | End: 2025-03-22
Payer: MEDICARE

## 2025-03-22 VITALS
RESPIRATION RATE: 22 BRPM | OXYGEN SATURATION: 100 % | HEIGHT: 62 IN | TEMPERATURE: 99 F | HEART RATE: 90 BPM | WEIGHT: 200 LBS | SYSTOLIC BLOOD PRESSURE: 199 MMHG | DIASTOLIC BLOOD PRESSURE: 113 MMHG | BODY MASS INDEX: 36.8 KG/M2

## 2025-03-22 PROCEDURE — 99900041 HC LEFT WITHOUT BEING SEEN- EMERGENCY

## 2025-03-23 LAB
OHS QRS DURATION: 90 MS
OHS QTC CALCULATION: 460 MS

## 2025-03-24 ENCOUNTER — INFUSION (OUTPATIENT)
Dept: INFUSION THERAPY | Facility: HOSPITAL | Age: 45
End: 2025-03-24
Payer: MEDICARE

## 2025-03-24 ENCOUNTER — TELEPHONE (OUTPATIENT)
Dept: HEMATOLOGY/ONCOLOGY | Facility: CLINIC | Age: 45
End: 2025-03-24
Payer: MEDICARE

## 2025-03-24 VITALS
HEART RATE: 87 BPM | BODY MASS INDEX: 36.79 KG/M2 | WEIGHT: 199.94 LBS | TEMPERATURE: 99 F | SYSTOLIC BLOOD PRESSURE: 184 MMHG | HEIGHT: 62 IN | RESPIRATION RATE: 80 BRPM | DIASTOLIC BLOOD PRESSURE: 97 MMHG

## 2025-03-24 DIAGNOSIS — D57.00 HB-SS DISEASE WITH VASO-OCCLUSIVE PAIN: ICD-10-CM

## 2025-03-24 DIAGNOSIS — D50.9 IRON DEFICIENCY ANEMIA, UNSPECIFIED IRON DEFICIENCY ANEMIA TYPE: Primary | ICD-10-CM

## 2025-03-24 DIAGNOSIS — D57.439 SICKLE CELL DISEASE, TYPE S BETA-ZERO THALASSEMIA WITH CRISIS: ICD-10-CM

## 2025-03-24 DIAGNOSIS — D57.00 VASO-OCCLUSIVE PAIN DUE TO SICKLE CELL DISEASE: ICD-10-CM

## 2025-03-24 PROCEDURE — 96376 TX/PRO/DX INJ SAME DRUG ADON: CPT

## 2025-03-24 PROCEDURE — 96375 TX/PRO/DX INJ NEW DRUG ADDON: CPT

## 2025-03-24 PROCEDURE — A4216 STERILE WATER/SALINE, 10 ML: HCPCS | Performed by: INTERNAL MEDICINE

## 2025-03-24 PROCEDURE — 96374 THER/PROPH/DIAG INJ IV PUSH: CPT

## 2025-03-24 PROCEDURE — 25000003 PHARM REV CODE 250: Performed by: INTERNAL MEDICINE

## 2025-03-24 PROCEDURE — 63600175 PHARM REV CODE 636 W HCPCS: Performed by: INTERNAL MEDICINE

## 2025-03-24 RX ORDER — HEPARIN 100 UNIT/ML
500 SYRINGE INTRAVENOUS
Status: CANCELLED | OUTPATIENT
Start: 2025-03-24

## 2025-03-24 RX ORDER — SODIUM CHLORIDE 0.9 % (FLUSH) 0.9 %
10 SYRINGE (ML) INJECTION
Status: DISCONTINUED | OUTPATIENT
Start: 2025-03-24 | End: 2025-03-24 | Stop reason: HOSPADM

## 2025-03-24 RX ORDER — HEPARIN 100 UNIT/ML
500 SYRINGE INTRAVENOUS
Status: DISCONTINUED | OUTPATIENT
Start: 2025-03-24 | End: 2025-03-24 | Stop reason: HOSPADM

## 2025-03-24 RX ORDER — HYDROMORPHONE HYDROCHLORIDE 1 MG/ML
1 INJECTION, SOLUTION INTRAMUSCULAR; INTRAVENOUS; SUBCUTANEOUS ONCE
Status: CANCELLED
Start: 2025-03-24 | End: 2025-03-24

## 2025-03-24 RX ORDER — SODIUM CHLORIDE 0.9 % (FLUSH) 0.9 %
10 SYRINGE (ML) INJECTION
Status: CANCELLED | OUTPATIENT
Start: 2025-03-24

## 2025-03-24 RX ORDER — DIPHENHYDRAMINE HYDROCHLORIDE 50 MG/ML
25 INJECTION, SOLUTION INTRAMUSCULAR; INTRAVENOUS ONCE
Status: COMPLETED | OUTPATIENT
Start: 2025-03-24 | End: 2025-03-24

## 2025-03-24 RX ORDER — HYDROMORPHONE HYDROCHLORIDE 1 MG/ML
1 INJECTION, SOLUTION INTRAMUSCULAR; INTRAVENOUS; SUBCUTANEOUS ONCE
Status: CANCELLED | OUTPATIENT
Start: 2025-03-24 | End: 2025-03-24

## 2025-03-24 RX ORDER — HYDROMORPHONE HYDROCHLORIDE 2 MG/ML
1 INJECTION, SOLUTION INTRAMUSCULAR; INTRAVENOUS; SUBCUTANEOUS ONCE
Status: COMPLETED | OUTPATIENT
Start: 2025-03-24 | End: 2025-03-24

## 2025-03-24 RX ORDER — DIPHENHYDRAMINE HYDROCHLORIDE 50 MG/ML
25 INJECTION, SOLUTION INTRAMUSCULAR; INTRAVENOUS ONCE
Status: CANCELLED | OUTPATIENT
Start: 2025-03-24 | End: 2025-03-24

## 2025-03-24 RX ADMIN — Medication 10 ML: at 05:03

## 2025-03-24 RX ADMIN — HEPARIN SODIUM (PORCINE) LOCK FLUSH IV SOLN 100 UNIT/ML 500 UNITS: 100 SOLUTION at 05:03

## 2025-03-24 RX ADMIN — HYDROMORPHONE HYDROCHLORIDE 1 MG: 2 INJECTION, SOLUTION INTRAMUSCULAR; INTRAVENOUS; SUBCUTANEOUS at 03:03

## 2025-03-24 RX ADMIN — SODIUM CHLORIDE 1000 ML: 9 INJECTION, SOLUTION INTRAVENOUS at 02:03

## 2025-03-24 RX ADMIN — DIPHENHYDRAMINE HYDROCHLORIDE 25 MG: 50 INJECTION INTRAMUSCULAR; INTRAVENOUS at 02:03

## 2025-03-24 NOTE — TELEPHONE ENCOUNTER
Offered pt appointment for today at 2:00 PM. Pt verbalized understanding and agreeable to appointment.

## 2025-03-24 NOTE — TELEPHONE ENCOUNTER
----- Message from Shiraz sent at 3/24/2025 12:33 PM CDT -----  Regarding: Consult/Advisory  Contact: [phone]  Consult/Advisory Name Of Caller:  Nazanin  Contact Preference:  [phone] Nature of call: Pt is calling to see if she can be scheduled for fluids and pain medication today.

## 2025-03-24 NOTE — PLAN OF CARE
1705-Pt tolerated IVFs and dilaudid infusion well. Pt needed a second dose of pain meds due to unrelieved 10/10 pain. Pt advised to go to emergency room if pain continues at 10/10. No complaints or complications reported. Vital Signs stable. PAC blood return noted, Heparin locked and PAC needle removed. Pt aware to call provider with any questions or  concerns, aware of upcoming appointments, ambulatory from clinic with steady gait, no distress noted.

## 2025-03-25 ENCOUNTER — HOSPITAL ENCOUNTER (INPATIENT)
Facility: OTHER | Age: 45
LOS: 12 days | Discharge: HOME | DRG: 812 | End: 2025-04-06
Attending: EMERGENCY MEDICINE | Admitting: STUDENT IN AN ORGANIZED HEALTH CARE EDUCATION/TRAINING PROGRAM
Payer: MEDICARE

## 2025-03-25 DIAGNOSIS — G89.4 CHRONIC PAIN SYNDROME: ICD-10-CM

## 2025-03-25 DIAGNOSIS — Z79.01 CHRONIC ANTICOAGULATION: Primary | Chronic | ICD-10-CM

## 2025-03-25 DIAGNOSIS — R07.9 CHEST PAIN: ICD-10-CM

## 2025-03-25 DIAGNOSIS — Z86.711 HISTORY OF PULMONARY EMBOLISM: Chronic | ICD-10-CM

## 2025-03-25 DIAGNOSIS — D57.00 SICKLE CELL PAIN CRISIS: ICD-10-CM

## 2025-03-25 DIAGNOSIS — D57.00 HB-SS DISEASE WITH VASO-OCCLUSIVE PAIN: ICD-10-CM

## 2025-03-25 PROBLEM — M79.604 ACUTE PAIN OF RIGHT LOWER EXTREMITY: Status: ACTIVE | Noted: 2025-01-03

## 2025-03-25 LAB
ABSOLUTE EOSINOPHIL (OHS): 0.18 K/UL
ABSOLUTE MONOCYTE (OHS): 0.66 K/UL (ref 0.3–1)
ABSOLUTE NEUTROPHIL COUNT (OHS): 3.82 K/UL (ref 1.8–7.7)
ALBUMIN SERPL BCP-MCNC: 4.1 G/DL (ref 3.5–5.2)
ALP SERPL-CCNC: 94 UNIT/L (ref 40–150)
ALT SERPL W/O P-5'-P-CCNC: 12 UNIT/L (ref 10–44)
ANION GAP (OHS): 10 MMOL/L (ref 8–16)
AST SERPL-CCNC: 17 UNIT/L (ref 11–45)
BASOPHILS # BLD AUTO: 0.09 K/UL
BASOPHILS NFR BLD AUTO: 1.3 %
BILIRUB SERPL-MCNC: 0.6 MG/DL (ref 0.1–1)
BILIRUB UR QL STRIP.AUTO: NEGATIVE
BUN SERPL-MCNC: 15 MG/DL (ref 6–20)
CALCIUM SERPL-MCNC: 9.6 MG/DL (ref 8.7–10.5)
CHLORIDE SERPL-SCNC: 107 MMOL/L (ref 95–110)
CLARITY UR: CLEAR
CO2 SERPL-SCNC: 22 MMOL/L (ref 23–29)
COLOR UR AUTO: YELLOW
CREAT SERPL-MCNC: 1 MG/DL (ref 0.5–1.4)
ERYTHROCYTE [DISTWIDTH] IN BLOOD BY AUTOMATED COUNT: 21.4 % (ref 11.5–14.5)
GFR SERPLBLD CREATININE-BSD FMLA CKD-EPI: >60 ML/MIN/1.73/M2
GLUCOSE SERPL-MCNC: 130 MG/DL (ref 70–110)
GLUCOSE UR QL STRIP: NEGATIVE
HCT VFR BLD AUTO: 29.3 % (ref 37–48.5)
HGB BLD-MCNC: 9.7 GM/DL (ref 12–16)
HGB UR QL STRIP: NEGATIVE
IMM GRANULOCYTES # BLD AUTO: 0.02 K/UL (ref 0–0.04)
IMM GRANULOCYTES NFR BLD AUTO: 0.3 % (ref 0–0.5)
KETONES UR QL STRIP: NEGATIVE
LEUKOCYTE ESTERASE UR QL STRIP: NEGATIVE
LIPASE SERPL-CCNC: 71 U/L (ref 4–60)
LYMPHOCYTES # BLD AUTO: 2.33 K/UL (ref 1–4.8)
MCH RBC QN AUTO: 23 PG (ref 27–50)
MCHC RBC AUTO-ENTMCNC: 33.1 G/DL (ref 32–36)
MCV RBC AUTO: 69 FL (ref 82–98)
NITRITE UR QL STRIP: NEGATIVE
NUCLEATED RBC (/100WBC) (OHS): 1 /100 WBC
PH UR STRIP: 6 [PH]
PLATELET # BLD AUTO: 366 K/UL (ref 150–450)
PMV BLD AUTO: 8.5 FL (ref 9.2–12.9)
POTASSIUM SERPL-SCNC: 3.8 MMOL/L (ref 3.5–5.1)
PROT SERPL-MCNC: 8.5 GM/DL (ref 6–8.4)
PROT UR QL STRIP: NEGATIVE
RBC # BLD AUTO: 4.22 M/UL (ref 4–5.4)
RELATIVE EOSINOPHIL (OHS): 2.5 %
RELATIVE LYMPHOCYTE (OHS): 32.8 % (ref 18–48)
RELATIVE MONOCYTE (OHS): 9.3 % (ref 4–15)
RELATIVE NEUTROPHIL (OHS): 53.8 % (ref 38–73)
RETICS/RBC NFR AUTO: 1.1 % (ref 0.5–2.5)
SODIUM SERPL-SCNC: 139 MMOL/L (ref 136–145)
SP GR UR STRIP: 1.01
UROBILINOGEN UR STRIP-ACNC: NEGATIVE EU/DL
WBC # BLD AUTO: 7.1 K/UL (ref 3.9–12.7)

## 2025-03-25 PROCEDURE — 96375 TX/PRO/DX INJ NEW DRUG ADDON: CPT

## 2025-03-25 PROCEDURE — 82040 ASSAY OF SERUM ALBUMIN: CPT | Performed by: EMERGENCY MEDICINE

## 2025-03-25 PROCEDURE — 11000001 HC ACUTE MED/SURG PRIVATE ROOM

## 2025-03-25 PROCEDURE — 63600175 PHARM REV CODE 636 W HCPCS: Performed by: EMERGENCY MEDICINE

## 2025-03-25 PROCEDURE — 25000003 PHARM REV CODE 250: Performed by: EMERGENCY MEDICINE

## 2025-03-25 PROCEDURE — 96374 THER/PROPH/DIAG INJ IV PUSH: CPT

## 2025-03-25 PROCEDURE — 25000003 PHARM REV CODE 250: Performed by: STUDENT IN AN ORGANIZED HEALTH CARE EDUCATION/TRAINING PROGRAM

## 2025-03-25 PROCEDURE — 85045 AUTOMATED RETICULOCYTE COUNT: CPT | Performed by: EMERGENCY MEDICINE

## 2025-03-25 PROCEDURE — 83690 ASSAY OF LIPASE: CPT | Performed by: EMERGENCY MEDICINE

## 2025-03-25 PROCEDURE — 21400001 HC TELEMETRY ROOM

## 2025-03-25 PROCEDURE — 99285 EMERGENCY DEPT VISIT HI MDM: CPT | Mod: 25

## 2025-03-25 PROCEDURE — 63600175 PHARM REV CODE 636 W HCPCS: Performed by: STUDENT IN AN ORGANIZED HEALTH CARE EDUCATION/TRAINING PROGRAM

## 2025-03-25 PROCEDURE — 81003 URINALYSIS AUTO W/O SCOPE: CPT | Performed by: EMERGENCY MEDICINE

## 2025-03-25 PROCEDURE — 85025 COMPLETE CBC W/AUTO DIFF WBC: CPT | Performed by: EMERGENCY MEDICINE

## 2025-03-25 PROCEDURE — 96361 HYDRATE IV INFUSION ADD-ON: CPT

## 2025-03-25 PROCEDURE — 96376 TX/PRO/DX INJ SAME DRUG ADON: CPT

## 2025-03-25 RX ORDER — SODIUM CHLORIDE 9 MG/ML
1000 INJECTION, SOLUTION INTRAVENOUS
Status: COMPLETED | OUTPATIENT
Start: 2025-03-25 | End: 2025-03-25

## 2025-03-25 RX ORDER — PRAZOSIN HYDROCHLORIDE 1 MG/1
1 CAPSULE ORAL NIGHTLY
Status: DISCONTINUED | OUTPATIENT
Start: 2025-03-25 | End: 2025-04-06 | Stop reason: HOSPADM

## 2025-03-25 RX ORDER — HYDROMORPHONE HYDROCHLORIDE 1 MG/ML
2 INJECTION, SOLUTION INTRAMUSCULAR; INTRAVENOUS; SUBCUTANEOUS
Status: COMPLETED | OUTPATIENT
Start: 2025-03-25 | End: 2025-03-25

## 2025-03-25 RX ORDER — IBUPROFEN 200 MG
16 TABLET ORAL
Status: DISCONTINUED | OUTPATIENT
Start: 2025-03-25 | End: 2025-04-06 | Stop reason: HOSPADM

## 2025-03-25 RX ORDER — HYDROMORPHONE HYDROCHLORIDE 1 MG/ML
2 INJECTION, SOLUTION INTRAMUSCULAR; INTRAVENOUS; SUBCUTANEOUS
Refills: 0 | Status: COMPLETED | OUTPATIENT
Start: 2025-03-25 | End: 2025-03-25

## 2025-03-25 RX ORDER — FLUOXETINE HYDROCHLORIDE 20 MG/1
80 CAPSULE ORAL DAILY
Status: DISCONTINUED | OUTPATIENT
Start: 2025-03-25 | End: 2025-04-06 | Stop reason: HOSPADM

## 2025-03-25 RX ORDER — SODIUM CHLORIDE 0.9 % (FLUSH) 0.9 %
10 SYRINGE (ML) INJECTION EVERY 12 HOURS PRN
Status: DISCONTINUED | OUTPATIENT
Start: 2025-03-25 | End: 2025-04-06 | Stop reason: HOSPADM

## 2025-03-25 RX ORDER — PANTOPRAZOLE SODIUM 40 MG/1
40 TABLET, DELAYED RELEASE ORAL 2 TIMES DAILY
Status: DISCONTINUED | OUTPATIENT
Start: 2025-03-25 | End: 2025-04-06 | Stop reason: HOSPADM

## 2025-03-25 RX ORDER — DIPHENHYDRAMINE HYDROCHLORIDE 50 MG/ML
12.5 INJECTION, SOLUTION INTRAMUSCULAR; INTRAVENOUS
Status: COMPLETED | OUTPATIENT
Start: 2025-03-25 | End: 2025-03-25

## 2025-03-25 RX ORDER — GABAPENTIN 300 MG/1
600 CAPSULE ORAL 3 TIMES DAILY
Status: DISCONTINUED | OUTPATIENT
Start: 2025-03-25 | End: 2025-04-06 | Stop reason: HOSPADM

## 2025-03-25 RX ORDER — MORPHINE SULFATE 2 MG/ML
17 INJECTION, SOLUTION INTRAMUSCULAR; INTRAVENOUS
Status: DISCONTINUED | OUTPATIENT
Start: 2025-03-25 | End: 2025-03-26

## 2025-03-25 RX ORDER — AMOXICILLIN 250 MG
1 CAPSULE ORAL DAILY PRN
Status: DISCONTINUED | OUTPATIENT
Start: 2025-03-25 | End: 2025-03-29

## 2025-03-25 RX ORDER — NALOXONE HCL 0.4 MG/ML
0.02 VIAL (ML) INJECTION
Status: DISCONTINUED | OUTPATIENT
Start: 2025-03-25 | End: 2025-04-06 | Stop reason: HOSPADM

## 2025-03-25 RX ORDER — DIPHENHYDRAMINE HCL 25 MG
25 CAPSULE ORAL
Status: DISCONTINUED | OUTPATIENT
Start: 2025-03-25 | End: 2025-03-25

## 2025-03-25 RX ORDER — AMLODIPINE BESYLATE 5 MG/1
10 TABLET ORAL DAILY
Status: DISCONTINUED | OUTPATIENT
Start: 2025-03-25 | End: 2025-04-06 | Stop reason: HOSPADM

## 2025-03-25 RX ORDER — FOLIC ACID 1 MG/1
1 TABLET ORAL DAILY
Status: DISCONTINUED | OUTPATIENT
Start: 2025-03-25 | End: 2025-04-06 | Stop reason: HOSPADM

## 2025-03-25 RX ORDER — HYDROCODONE BITARTRATE AND ACETAMINOPHEN 10; 325 MG/1; MG/1
1 TABLET ORAL EVERY 6 HOURS PRN
Refills: 0 | Status: DISCONTINUED | OUTPATIENT
Start: 2025-03-25 | End: 2025-04-06 | Stop reason: HOSPADM

## 2025-03-25 RX ORDER — TIZANIDINE 2 MG/1
4 TABLET ORAL EVERY 8 HOURS PRN
Status: DISCONTINUED | OUTPATIENT
Start: 2025-03-25 | End: 2025-04-06 | Stop reason: HOSPADM

## 2025-03-25 RX ORDER — DIPHENHYDRAMINE HCL 25 MG
50 CAPSULE ORAL
Status: COMPLETED | OUTPATIENT
Start: 2025-03-25 | End: 2025-03-25

## 2025-03-25 RX ORDER — ACETAMINOPHEN 500 MG
1000 TABLET ORAL EVERY 8 HOURS
Status: DISCONTINUED | OUTPATIENT
Start: 2025-03-25 | End: 2025-04-06 | Stop reason: HOSPADM

## 2025-03-25 RX ORDER — IBUPROFEN 200 MG
24 TABLET ORAL
Status: DISCONTINUED | OUTPATIENT
Start: 2025-03-25 | End: 2025-04-06 | Stop reason: HOSPADM

## 2025-03-25 RX ORDER — SODIUM CHLORIDE, SODIUM LACTATE, POTASSIUM CHLORIDE, CALCIUM CHLORIDE 600; 310; 30; 20 MG/100ML; MG/100ML; MG/100ML; MG/100ML
INJECTION, SOLUTION INTRAVENOUS CONTINUOUS
Status: DISCONTINUED | OUTPATIENT
Start: 2025-03-25 | End: 2025-03-31

## 2025-03-25 RX ORDER — ENOXAPARIN SODIUM 100 MG/ML
40 INJECTION SUBCUTANEOUS EVERY 12 HOURS
Status: DISCONTINUED | OUTPATIENT
Start: 2025-03-25 | End: 2025-03-26

## 2025-03-25 RX ORDER — GLUCAGON 1 MG
1 KIT INJECTION
Status: DISCONTINUED | OUTPATIENT
Start: 2025-03-25 | End: 2025-04-06 | Stop reason: HOSPADM

## 2025-03-25 RX ORDER — MORPHINE SULFATE 15 MG/1
30 TABLET, FILM COATED, EXTENDED RELEASE ORAL 3 TIMES DAILY
Refills: 0 | Status: DISCONTINUED | OUTPATIENT
Start: 2025-03-25 | End: 2025-04-06 | Stop reason: HOSPADM

## 2025-03-25 RX ORDER — ONDANSETRON HYDROCHLORIDE 2 MG/ML
4 INJECTION, SOLUTION INTRAVENOUS
Status: COMPLETED | OUTPATIENT
Start: 2025-03-25 | End: 2025-03-25

## 2025-03-25 RX ORDER — DIPHENHYDRAMINE HYDROCHLORIDE 50 MG/ML
25 INJECTION, SOLUTION INTRAMUSCULAR; INTRAVENOUS
Status: DISCONTINUED | OUTPATIENT
Start: 2025-03-25 | End: 2025-04-01

## 2025-03-25 RX ORDER — HYDROMORPHONE HYDROCHLORIDE 1 MG/ML
1 INJECTION, SOLUTION INTRAMUSCULAR; INTRAVENOUS; SUBCUTANEOUS
Refills: 0 | Status: COMPLETED | OUTPATIENT
Start: 2025-03-25 | End: 2025-03-25

## 2025-03-25 RX ADMIN — MORPHINE SULFATE 30 MG: 15 TABLET, FILM COATED, EXTENDED RELEASE ORAL at 08:03

## 2025-03-25 RX ADMIN — ACETAMINOPHEN 1000 MG: 500 TABLET ORAL at 09:03

## 2025-03-25 RX ADMIN — PRAZOSIN HYDROCHLORIDE 1 MG: 1 CAPSULE ORAL at 08:03

## 2025-03-25 RX ADMIN — TIZANIDINE 4 MG: 2 TABLET ORAL at 05:03

## 2025-03-25 RX ADMIN — DIPHENHYDRAMINE HYDROCHLORIDE 25 MG: 50 INJECTION INTRAMUSCULAR; INTRAVENOUS at 09:03

## 2025-03-25 RX ADMIN — SODIUM CHLORIDE, POTASSIUM CHLORIDE, SODIUM LACTATE AND CALCIUM CHLORIDE: 600; 310; 30; 20 INJECTION, SOLUTION INTRAVENOUS at 05:03

## 2025-03-25 RX ADMIN — HYDROMORPHONE HYDROCHLORIDE 1 MG: 1 INJECTION, SOLUTION INTRAMUSCULAR; INTRAVENOUS; SUBCUTANEOUS at 12:03

## 2025-03-25 RX ADMIN — GABAPENTIN 600 MG: 300 CAPSULE ORAL at 02:03

## 2025-03-25 RX ADMIN — DIPHENHYDRAMINE HYDROCHLORIDE 25 MG: 50 INJECTION INTRAMUSCULAR; INTRAVENOUS at 04:03

## 2025-03-25 RX ADMIN — HYDROMORPHONE HYDROCHLORIDE 2 MG: 1 INJECTION, SOLUTION INTRAMUSCULAR; INTRAVENOUS; SUBCUTANEOUS at 08:03

## 2025-03-25 RX ADMIN — HYDROMORPHONE HYDROCHLORIDE 2 MG: 1 INJECTION, SOLUTION INTRAMUSCULAR; INTRAVENOUS; SUBCUTANEOUS at 04:03

## 2025-03-25 RX ADMIN — DIPHENHYDRAMINE HYDROCHLORIDE 50 MG: 25 CAPSULE ORAL at 05:03

## 2025-03-25 RX ADMIN — AMLODIPINE BESYLATE 10 MG: 5 TABLET ORAL at 02:03

## 2025-03-25 RX ADMIN — MORPHINE SULFATE 17 MG: 2 INJECTION, SOLUTION INTRAMUSCULAR; INTRAVENOUS at 09:03

## 2025-03-25 RX ADMIN — MORPHINE SULFATE 30 MG: 15 TABLET, FILM COATED, EXTENDED RELEASE ORAL at 02:03

## 2025-03-25 RX ADMIN — FLUOXETINE HYDROCHLORIDE 80 MG: 20 CAPSULE ORAL at 05:03

## 2025-03-25 RX ADMIN — DIPHENHYDRAMINE HYDROCHLORIDE 12.5 MG: 50 INJECTION INTRAMUSCULAR; INTRAVENOUS at 12:03

## 2025-03-25 RX ADMIN — ONDANSETRON 4 MG: 2 INJECTION INTRAMUSCULAR; INTRAVENOUS at 05:03

## 2025-03-25 RX ADMIN — HYDROMORPHONE HYDROCHLORIDE 2 MG: 1 INJECTION, SOLUTION INTRAMUSCULAR; INTRAVENOUS; SUBCUTANEOUS at 05:03

## 2025-03-25 RX ADMIN — ENOXAPARIN SODIUM 40 MG: 40 INJECTION SUBCUTANEOUS at 08:03

## 2025-03-25 RX ADMIN — GABAPENTIN 600 MG: 300 CAPSULE ORAL at 08:03

## 2025-03-25 RX ADMIN — DIPHENHYDRAMINE HYDROCHLORIDE 12.5 MG: 50 INJECTION, SOLUTION INTRAMUSCULAR; INTRAVENOUS at 07:03

## 2025-03-25 RX ADMIN — FOLIC ACID 1 MG: 1 TABLET ORAL at 02:03

## 2025-03-25 RX ADMIN — PANTOPRAZOLE SODIUM 40 MG: 40 TABLET, DELAYED RELEASE ORAL at 08:03

## 2025-03-25 RX ADMIN — SODIUM CHLORIDE 1000 ML: 9 INJECTION, SOLUTION INTRAVENOUS at 04:03

## 2025-03-25 RX ADMIN — MORPHINE SULFATE 17 MG: 2 INJECTION, SOLUTION INTRAMUSCULAR; INTRAVENOUS at 03:03

## 2025-03-25 NOTE — ED NOTES
Resting quietly, Resp. even and unlabored, NAD noted, Call light within reach, SR x2, Bed in lowest position, Will monitor.

## 2025-03-25 NOTE — H&P
[facility] Medicine  History & Physical    Patient Name: Nazanin Malone  MRN: [id number]  Patient Class: IP- Inpatient  Admission Date: 3/25/2025  Attending Physician: Kezia att. providers found   Primary Care Provider: Kezia Primary Doctor         Patient information was obtained from patient, past medical records, and ER records.     Subjective:     Principal Problem:Acute pain of right lower extremity    Chief Complaint:   Chief Complaint   Patient presents with    Sickle Cell Pain Crisis     R hip, R leg sickle cell pain x4 days with n/v abd pain. Pt received 2 mg Dilaudid injection at pain clinic yesterday evening without relief. Home Morphine ER and [place] attempted at midnight w/o relief. Denies sob, cp.         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 medications. Denies any trauma. Pt denies any other sx such as fever, cough, abd pain or chest pain.     In ED /109. Other vitals stable. Hb 9.7. Retic 1.1. UA unremarkable. Pt given multiple doses of dilaudid, benadryl 1L fluids and zofran. Admitted to hospital medicine for further mngx.     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    Asthma     Avascular necrosis of femur     Depression     History of pulmonary embolism     Hypertension     Morbid obesity     Opioid dependence 2017    Pneumonia due to Streptococcus pneumoniae 2017    Right lower lobe pneumonia 2017    Sepsis due to Streptococcus pneumoniae 2017    Sickle cell-beta thalassemia disease with pain     Trigeminal neuralgia        Past Surgical History:   Procedure Laterality Date     SECTION       ESOPHAGOGASTRODUODENOSCOPY N/A 11/13/2024    Procedure: EGD (ESOPHAGOGASTRODUODENOSCOPY);  Surgeon: Allen Marshall MD;  Location: [address]);  Service: Gastroenterology;  Laterality: N/A;    TONSILLECTOMY      TUBAL LIGATION         Review of patient's allergies indicates:   Allergen Reactions    Cat dander Anaphylaxis, Itching and Shortness Of Breath    Nsaids (non-steroidal anti-inflammatory drug) Itching and Anaphylaxis    Latex Rash       Current Facility-Administered Medications on File Prior to Encounter   Medication    [COMPLETED] HYDROmorphone (PF) injection 1 mg    [COMPLETED] sodium chloride 0.9% bolus 1,000 mL 1,000 mL    [DISCONTINUED] alteplase injection 2 mg    [DISCONTINUED] heparin, porcine (PF) 100 unit/mL injection flush 500 Units    [DISCONTINUED] sodium chloride 0.9% flush 10 mL     Current Outpatient Medications on File Prior to Encounter   Medication Sig    acetaminophen (TYLENOL) 325 MG tablet Take 2 tablets (650 mg total) by mouth every 8 (eight) hours as needed for Pain.    albuterol (VENTOLIN HFA) 90 mcg/actuation inhaler Inhale 2 puffs into the lungs every 6 (six) hours as needed for Wheezing or Shortness of Breath. Rescue    amLODIPine (NORVASC) 10 MG tablet Take 1 tablet (10 mg total) by mouth once daily.    ascorbic acid (VITAMIN C ORAL) Take 1 capsule by mouth 2 (two) times a day.    enoxaparin (LOVENOX) 40 mg/0.4 mL Syrg Inject 0.4 mLs (40 mg total) into the skin every 12 (twelve) hours.    FLUoxetine 40 MG capsule Take 2 capsules (80 mg total) by mouth once daily.    fluticasone propionate (FLONASE) 50 mcg/actuation nasal spray INSTILL 1 SPRAY INTO EACH NOSTRIL EVERY DAY    folic acid (FOLVITE) 1 MG tablet Take 1 tablet (1 mg total) by mouth once daily.    gabapentin (NEURONTIN) 300 MG capsule Take 2 capsules (600 mg total) by mouth 3 (three) times daily.    HYDROcodone-acetaminophen (NORCO)  mg per tablet Take 1 tablet by mouth every 4 to 6 hours as needed for Pain.     hydroxyurea (HYDREA) 500 mg Cap Take 2 capsules (1,000 mg total) by mouth once daily. (Patient not taking: Reported on 3/18/2025.)    metoclopramide HCl (REGLAN) 5 MG tablet Take 5 mg by mouth 4 (four) times daily.    morphine (MS CONTIN) 30 MG 12 hr tablet Take 1 tablet (30 mg total) by mouth 3 (three) times daily.    naloxone (NARCAN) 4 mg/actuation Spry 4mg by nasal route as needed for opioid overdose; may repeat every 2-3 minutes in alternating nostrils until medical help arrives. Call 911    pantoprazole (PROTONIX) 40 MG tablet Take 1 tablet (40 mg total) by mouth 2 (two) times daily.    prazosin (MINIPRESS) 1 MG Cap Take 1 capsule (1 mg total) by mouth every evening.    senna-docusate 8.6-50 mg (PERICOLACE) 8.6-50 mg per tablet Take 1 tablet by mouth daily as needed for Constipation.    sucralfate (CARAFATE) 1 gram tablet Take 1 tablet (1 g total) by mouth 4 (four) times daily before meals and nightly.    [] tiZANidine (ZANAFLEX) 4 MG tablet Take 1 tablet (4 mg total) by mouth every 8 (eight) hours. for 10 days     Family History       Problem Relation (Age of Onset)    Diabetes Mother    Heart disease Mother, Father          Tobacco Use    Smoking status: Never    Smokeless tobacco: Never   Substance and Sexual Activity    Alcohol use: No    Drug use: Yes     Types: Marijuana     Comment: periodically     Sexual activity: Not Currently     Birth control/protection: See Surgical Hx     Review of Systems   Constitutional:  Negative for fever.   Respiratory:  Negative for shortness of breath.    Cardiovascular:  Negative for chest pain.   Gastrointestinal:  Negative for abdominal pain.   Musculoskeletal:  Positive for arthralgias.   Psychiatric/Behavioral:  Negative for agitation and confusion.      Objective:     Vital Signs (Most Recent):  Temp: 98.3 °F (36.8 °C) (25 0949)  Pulse: 77 (25 1150)  Resp: 17 (25 1409)  BP: (!) 160/88 (25 1002)  SpO2: 97 % (25 1150) Vital Signs  (24h Range):  Temp:  [98.3 °F (36.8 °C)-99 °F (37.2 °C)] 98.3 °F (36.8 °C)  Pulse:  [74-89] 77  Resp:  [17-20] 17  SpO2:  [95 %-100 %] 97 %  BP: (160-182)/() 160/88     Weight: 94.3 kg (208 lb)  Body mass index is 38.04 kg/m².     Physical Exam  Constitutional:       General: She is in acute distress (from pain).   Pulmonary:      Effort: No respiratory distress.      Breath sounds: No wheezing.   Abdominal:      General: There is no distension.      Tenderness: There is no abdominal tenderness.   Musculoskeletal:      Comments: Right ankle, knee and hip pain. No overlying skin changes    Neurological:      General: No focal deficit present.      Mental Status: She is oriented to person, place, and time.                Significant Labs: All pertinent labs within the past 24 hours have been reviewed.    Significant Imaging: I have reviewed all pertinent imaging results/findings within the past 24 hours.  Assessment/Plan:     * Acute pain of right lower extremity  Sickle cell crisis vs worsening of chronic pain     Plan -   - Prefers morphine over dilaudid due to severe itching with dilaudid   - Morphine + benadryl PRN   - Cont home oral pain meds   - Cont home gabapentin   - Cont folic acid   - Xray - right hip, right knee, right ankle   - IV fluids       History of pulmonary embolism  - cont lovenox       Anxiety and depression  - cont home fluoxetine         Hypertension  - cont home amlodipine and prazosin       VTE Risk Mitigation (From admission, onward)           Ordered     enoxaparin injection 40 mg  Every 12 hours         03/25/25 1348     IP VTE HIGH RISK PATIENT  Once         03/25/25 1348     Place sequential compression device  Until discontinued         03/25/25 1348     Reason for No Pharmacological VTE Prophylaxis  Once        Question:  Reasons:  Answer:  Already adequately anticoagulated on oral Anticoagulants    03/25/25 1348                                    Sherine Foreman MD  Department  of [facility] Medicine  [facility] - Emergency Dept

## 2025-03-25 NOTE — ED NOTES
Nazanin Malone, a 44 y.o. female presents to the ED with x4 days 10/10 pain to R knee and leg, no recent injury, as well as n/v. Pt denies fever, chills, sob, cp. Pt taking home pain meds without relief, went to pain clinic yesterday and received 2 mg dilaudid.     Pt resting. Connected to BP cuff, and pulse oximeter. ED workup in progress. Call light within reach. Safety measures in place. Denies further needs. Plan of care ongoing.      Chief Complaint   Patient presents with    Sickle Cell Pain Crisis     R hip, R leg sickle cell pain x4 days with n/v abd pain. Pt received 2 mg Dilaudid injection at pain clinic yesterday evening without relief. Home Morphine ER and [place] attempted at midnight w/o relief. Denies sob, cp.      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    Asthma     Avascular necrosis of femur     Depression     History of pulmonary embolism     Hypertension     Morbid obesity     Opioid dependence 2017    Pneumonia due to Streptococcus pneumoniae 2017    Right lower lobe pneumonia 2017    Sepsis due to Streptococcus pneumoniae 2017    Sickle cell-beta thalassemia disease with pain     Trigeminal neuralgia      Past Surgical History:   Procedure Laterality Date     SECTION      ESOPHAGOGASTRODUODENOSCOPY N/A 2024    Procedure: EGD (ESOPHAGOGASTRODUODENOSCOPY);  Surgeon: Allen Marshall MD;  Location: [address]);  Service: Gastroenterology;  Laterality: N/A;    TONSILLECTOMY      TUBAL LIGATION

## 2025-03-25 NOTE — SUBJECTIVE & OBJECTIVE
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    Asthma     Avascular necrosis of femur     Depression     History of pulmonary embolism     Hypertension     Morbid obesity     Opioid dependence 2017    Pneumonia due to Streptococcus pneumoniae 2017    Right lower lobe pneumonia 2017    Sepsis due to Streptococcus pneumoniae 2017    Sickle cell-beta thalassemia disease with pain     Trigeminal neuralgia        Past Surgical History:   Procedure Laterality Date     SECTION      ESOPHAGOGASTRODUODENOSCOPY N/A 2024    Procedure: EGD (ESOPHAGOGASTRODUODENOSCOPY);  Surgeon: Allen Marshall MD;  Location: [address]);  Service: Gastroenterology;  Laterality: N/A;    TONSILLECTOMY      TUBAL LIGATION         Review of patient's allergies indicates:   Allergen Reactions    Cat dander Anaphylaxis, Itching and Shortness Of Breath    Nsaids (non-steroidal anti-inflammatory drug) Itching and Anaphylaxis    Latex Rash       Current Facility-Administered Medications on File Prior to Encounter   Medication    [COMPLETED] HYDROmorphone (PF) injection 1 mg    [COMPLETED] sodium chloride 0.9% bolus 1,000 mL 1,000 mL    [DISCONTINUED] alteplase injection 2 mg    [DISCONTINUED] heparin, porcine (PF) 100 unit/mL injection flush 500 Units    [DISCONTINUED] sodium chloride 0.9% flush 10 mL     Current Outpatient Medications on File Prior to Encounter   Medication Sig    acetaminophen (TYLENOL) 325 MG tablet Take 2 tablets (650 mg total) by mouth every 8 (eight) hours as needed for Pain.    albuterol (VENTOLIN HFA) 90 mcg/actuation inhaler Inhale 2 puffs into the lungs every 6 (six) hours as needed for Wheezing or Shortness of Breath. Rescue    amLODIPine (NORVASC) 10 MG tablet Take 1 tablet (10 mg total) by mouth once daily.    ascorbic acid (VITAMIN C ORAL) Take 1 capsule by mouth 2 (two)  times a day.    enoxaparin (LOVENOX) 40 mg/0.4 mL Syrg Inject 0.4 mLs (40 mg total) into the skin every 12 (twelve) hours.    FLUoxetine 40 MG capsule Take 2 capsules (80 mg total) by mouth once daily.    fluticasone propionate (FLONASE) 50 mcg/actuation nasal spray INSTILL 1 SPRAY INTO EACH NOSTRIL EVERY DAY    folic acid (FOLVITE) 1 MG tablet Take 1 tablet (1 mg total) by mouth once daily.    gabapentin (NEURONTIN) 300 MG capsule Take 2 capsules (600 mg total) by mouth 3 (three) times daily.    HYDROcodone-acetaminophen (NORCO)  mg per tablet Take 1 tablet by mouth every 4 to 6 hours as needed for Pain.    hydroxyurea (HYDREA) 500 mg Cap Take 2 capsules (1,000 mg total) by mouth once daily. (Patient not taking: Reported on 3/18/2025.)    metoclopramide HCl (REGLAN) 5 MG tablet Take 5 mg by mouth 4 (four) times daily.    morphine (MS CONTIN) 30 MG 12 hr tablet Take 1 tablet (30 mg total) by mouth 3 (three) times daily.    naloxone (NARCAN) 4 mg/actuation Spry 4mg by nasal route as needed for opioid overdose; may repeat every 2-3 minutes in alternating nostrils until medical help arrives. Call 911    pantoprazole (PROTONIX) 40 MG tablet Take 1 tablet (40 mg total) by mouth 2 (two) times daily.    prazosin (MINIPRESS) 1 MG Cap Take 1 capsule (1 mg total) by mouth every evening.    senna-docusate 8.6-50 mg (PERICOLACE) 8.6-50 mg per tablet Take 1 tablet by mouth daily as needed for Constipation.    sucralfate (CARAFATE) 1 gram tablet Take 1 tablet (1 g total) by mouth 4 (four) times daily before meals and nightly.    [] tiZANidine (ZANAFLEX) 4 MG tablet Take 1 tablet (4 mg total) by mouth every 8 (eight) hours. for 10 days     Family History       Problem Relation (Age of Onset)    Diabetes Mother    Heart disease Mother, Father          Tobacco Use    Smoking status: Never    Smokeless tobacco: Never   Substance and Sexual Activity    Alcohol use: No    Drug use: Yes     Types: Marijuana     Comment:  periodically     Sexual activity: Not Currently     Birth control/protection: See Surgical Hx     Review of Systems   Constitutional:  Negative for fever.   Respiratory:  Negative for shortness of breath.    Cardiovascular:  Negative for chest pain.   Gastrointestinal:  Negative for abdominal pain.   Musculoskeletal:  Positive for arthralgias.   Psychiatric/Behavioral:  Negative for agitation and confusion.      Objective:     Vital Signs (Most Recent):  Temp: 98.3 °F (36.8 °C) (03/25/25 0949)  Pulse: 77 (03/25/25 1150)  Resp: 17 (03/25/25 1409)  BP: (!) 160/88 (03/25/25 1002)  SpO2: 97 % (03/25/25 1150) Vital Signs (24h Range):  Temp:  [98.3 °F (36.8 °C)-99 °F (37.2 °C)] 98.3 °F (36.8 °C)  Pulse:  [74-89] 77  Resp:  [17-20] 17  SpO2:  [95 %-100 %] 97 %  BP: (160-182)/() 160/88     Weight: 94.3 kg (208 lb)  Body mass index is 38.04 kg/m².     Physical Exam  Constitutional:       General: She is in acute distress (from pain).   Pulmonary:      Effort: No respiratory distress.      Breath sounds: No wheezing.   Abdominal:      General: There is no distension.      Tenderness: There is no abdominal tenderness.   Musculoskeletal:      Comments: Right ankle, knee and hip pain. No overlying skin changes    Neurological:      General: No focal deficit present.      Mental Status: She is oriented to person, place, and time.                Significant Labs: All pertinent labs within the past 24 hours have been reviewed.    Significant Imaging: I have reviewed all pertinent imaging results/findings within the past 24 hours.

## 2025-03-25 NOTE — ASSESSMENT & PLAN NOTE
Sickle cell crisis vs worsening of chronic pain     Plan -   - Prefers morphine over dilaudid due to severe itching with dilaudid   - Morphine + benadryl PRN   - Cont home oral pain meds   - Cont home gabapentin   - Cont folic acid   - Xray - right hip, right knee, right ankle   - IV fluids

## 2025-03-25 NOTE — ED PROVIDER NOTES
Encounter Date: 3/25/2025       History     Chief Complaint   Patient presents with    Sickle Cell Pain Crisis     R hip, R leg sickle cell pain x4 days with n/v abd pain. Pt received 2 mg Dilaudid injection at pain clinic yesterday evening without relief. Home Morphine ER and [place] attempted at midnight w/o relief. Denies sob, cp.      44-year-old female with history of sickle cell anemia presents complaining of 4 days of right hip and leg pain as well as generalized abdominal pain.  She states it these symptoms are typical of her pain crisis and has been unrelieved by her home medications of [place] and MS Contin.  She also had a dose of Dilaudid at the outpatient infusion clinic yesterday with transient relief.  She reports associated nausea but denies any chest pain, shortness of breath, fever, chills, urinary symptoms, vomiting or diarrhea.  She also denies any recent injuries or strenuous activity.      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    Asthma     Avascular necrosis of femur     Depression     History of pulmonary embolism     Hypertension     Morbid obesity     Opioid dependence 2017    Pneumonia due to Streptococcus pneumoniae 2017    Right lower lobe pneumonia 2017    Sepsis due to Streptococcus pneumoniae 2017    Sickle cell-beta thalassemia disease with pain     Trigeminal neuralgia      Past Surgical History:   Procedure Laterality Date     SECTION      ESOPHAGOGASTRODUODENOSCOPY N/A 2024    Procedure: EGD (ESOPHAGOGASTRODUODENOSCOPY);  Surgeon: Allen Marshall MD;  Location: [facility] ([address] [place] [address]);  Service: Gastroenterology;  Laterality: N/A;     TONSILLECTOMY      TUBAL LIGATION       Family History   Problem Relation Name Age of Onset    Heart disease Mother      Diabetes Mother      Heart disease Father      Breast cancer Neg Hx      Ovarian cancer Neg Hx      Colon cancer Neg Hx       Social History[1]  Review of Systems    Physical Exam     Initial Vitals [03/25/25 0420]   BP Pulse Resp Temp SpO2   (!) 182/109 89 18 99 °F (37.2 °C) 95 %      MAP       --         Physical Exam    Constitutional: She appears well-developed and well-nourished. She is not diaphoretic. No distress.   HENT:   Head: Normocephalic and atraumatic.   Right Ear: External ear normal.   Left Ear: External ear normal.   Nose: Nose normal.   Eyes: Conjunctivae and EOM are normal.   Neck: Neck supple. No JVD present.   Normal range of motion.  Cardiovascular:  Normal rate, regular rhythm and normal heart sounds.     Exam reveals no gallop and no friction rub.       No murmur heard.  1+ DP and PT bilaterally   Pulmonary/Chest: Breath sounds normal. No respiratory distress. She has no wheezes. She has no rhonchi. She has no rales.   Abdominal: Abdomen is soft. She exhibits no distension. There is no abdominal tenderness. There is no rebound and no guarding.   Musculoskeletal:         General: No tenderness or edema. Normal range of motion.      Cervical back: Normal range of motion and neck supple.     Neurological: She is alert and oriented to person, place, and time. She has normal strength.         ED Course   Procedures  Labs Reviewed   COMPREHENSIVE METABOLIC PANEL - Abnormal       Result Value    Sodium 139      Potassium 3.8      Chloride 107      CO2 22 (*)     Glucose 130 (*)     BUN 15      Creatinine 1.0      Calcium 9.6      Protein Total 8.5 (*)     Albumin 4.1      Bilirubin Total 0.6      ALP 94      AST 17      ALT 12      Anion Gap 10      eGFR >60     LIPASE - Abnormal    Lipase Level 71 (*)    CBC WITH DIFFERENTIAL - Abnormal    WBC 7.10      RBC 4.22      HGB 9.7 (*)      HCT 29.3 (*)     MCV 69 (*)     MCH 23.0 (*)     MCHC 33.1      RDW 21.4 (*)     Platelet Count 366      MPV 8.5 (*)     Nucleated RBC 1 (*)     Neut % 53.8      Lymph % 32.8      Mono % 9.3      Eos % 2.5      Basophil % 1.3      Imm Grans % 0.3      Neut # 3.82      Lymph # 2.33      Mono # 0.66      Eos # 0.18      Baso # 0.09      Imm Grans # 0.02     RETICULOCYTES - Normal    Retic Count, Automated 1.1     URINALYSIS, REFLEX TO URINE CULTURE - Normal    Color, UA Yellow      Appearance, UA Clear      pH, UA 6.0      Spec Grav UA 1.010      Protein, UA Negative      Glucose, UA Negative      Ketones, UA Negative      Bilirubin, UA Negative      Blood, UA Negative      Nitrites, UA Negative      Urobilinogen, UA Negative      Leukocyte Esterase, UA Negative     CBC W/ AUTO DIFFERENTIAL    Narrative:     The following orders were created for panel order CBC Auto Differential.  Procedure                               Abnormality         Status                     ---------                               -----------         ------                     CBC with Differential[[id number]]       Abnormal            Final result                 Please view results for these tests on the individual orders.          Imaging Results              X-Ray Knee 1 or 2 View Right (Final result)  Result time 03/25/25 14:23:17      Final result by Austin Santana III, MD (03/25/25 14:23:17)                   Narrative:    EXAMINATION:  XR KNEE 1 OR 2 VIEW RIGHT    CLINICAL HISTORY:  pain;    FINDINGS:  Right knee:    There are 2 synovial osteochondromas in the superior knee joint recess.  There is a joint effusion.  There is DJD.  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  No fracture, dislocation, or bone destruction seen.      Electronically signed by: Austin Santana MD  Date:    03/25/2025  Time:    14:23                                     X-Ray Hip 2 or 3 views Right with Pelvis when  performed (Final result)  Result time 03/25/25 14:23:35      Final result by Austin Santana III, MD (03/25/25 14:23:35)                   Narrative:    EXAMINATION:  XR HIP WITH PELVIS WHEN PERFORMED 2 OR 3 VIEWS RIGHT    CLINICAL HISTORY:  pain;    FINDINGS:  Hip three views right.    No fracture, dislocation, or bone destruction seen.      Electronically signed by: Austin Santana MD  Date:    03/25/2025  Time:    14:23                                     X-Ray Ankle 2 View Right (Final result)  Result time 03/25/25 14:21:44      Final result by Austin Santana III, MD (03/25/25 14:21:44)                   Narrative:    EXAMINATION:  XR ANKLE 2 VIEW RIGHT    CLINICAL HISTORY:  pain;    FINDINGS:  Ankle three views right.    There is an osteochondral defect of the medial talar dome.  Ankle mortise is preserved.  There are spurs on the calcaneus.  There is an os trigonum.  No acute fracture, dislocation, or bone destruction seen.      Electronically signed by: Austin Santana MD  Date:    03/25/2025  Time:    14:21                                     Medications   amLODIPine tablet 10 mg (10 mg Oral Given 3/25/25 1409)   enoxaparin injection 40 mg (40 mg Subcutaneous Given 3/25/25 2034)   FLUoxetine capsule 80 mg (80 mg Oral Given 3/25/25 1702)   folic acid tablet 1 mg (1 mg Oral Given 3/25/25 1409)   gabapentin capsule 600 mg (600 mg Oral Given 3/25/25 2034)   HYDROcodone-acetaminophen  mg per tablet 1 tablet (has no administration in time range)   morphine 12 hr tablet 30 mg (30 mg Oral Given 3/25/25 2035)   pantoprazole EC tablet 40 mg (40 mg Oral Given 3/25/25 2034)   prazosin capsule 1 mg (1 mg Oral Given 3/25/25 2034)   senna-docusate 8.6-50 mg per tablet 1 tablet (has no administration in time range)   tiZANidine tablet 4 mg (4 mg Oral Given 3/25/25 1741)   diphenhydrAMINE injection 25 mg (25 mg Intravenous Given 3/26/25 0103)   sodium chloride 0.9% flush 10 mL (has no administration in time range)    naloxone 0.4 mg/mL injection 0.02 mg (has no administration in time range)   glucose chewable tablet 16 g (has no administration in time range)   glucose chewable tablet 24 g (has no administration in time range)   dextrose 50% injection 12.5 g (has no administration in time range)   dextrose 50% injection 25 g (has no administration in time range)   glucagon (human recombinant) injection 1 mg (has no administration in time range)   lactated ringers infusion ( Intravenous New Bag 3/25/25 1743)   acetaminophen tablet 1,000 mg (1,000 mg Oral Given 3/25/25 2129)   mupirocin 2 % ointment (has no administration in time range)   morphine injection 17 mg (17 mg Intravenous Given 3/26/25 0103)   0.9% NaCl infusion (0 mLs Intravenous Stopped 3/25/25 0551)   HYDROmorphone injection 2 mg (2 mg Intravenous Given 3/25/25 0452)   ondansetron injection 4 mg (4 mg Intravenous Given 3/25/25 0523)   diphenhydrAMINE capsule 50 mg (50 mg Oral Given 3/25/25 0522)   HYDROmorphone injection 2 mg (2 mg Intravenous Given 3/25/25 0550)   diphenhydrAMINE injection 12.5 mg (12.5 mg Intravenous Given 3/25/25 0747)   HYDROmorphone injection 2 mg (2 mg Intravenous Given 3/25/25 0841)   HYDROmorphone injection 1 mg (1 mg Intravenous Given 3/25/25 1201)   diphenhydrAMINE injection 12.5 mg (12.5 mg Intravenous Given 3/25/25 1206)     Medical Decision Making  44-year-old female with history of sickle cell anemia presents with complaint of generalized abdominal pain, nausea, and atraumatic right hip and leg pain.  Patient is neurovascularly intact.  No exam findings concerning for compartment syndrome or DVT.  Symptoms likely secondary to vaso-occlusive crisis as she states that the symptoms are typical of her pain crisis.  However, differential diagnosis also includes but is not limited to UTI, colitis, enteritis, electrolyte derangement, dehydration, muscle strain, muscle spasm.  Will obtain lab work and urinalysis and give IV analgesics,  antiemetics and IV fluids.    Amount and/or Complexity of Data Reviewed  Labs: ordered.    Risk  OTC drugs.  Prescription drug management.  Decision regarding hospitalization.               ED Course as of 03/26/25 0136   Tue Mar 25, 2025   0744 Itching post meds. Benadryl ordered.  [TK]      ED Course User Index  [TK] Clint Rene MD                           Clinical Impression:  Final diagnoses:  [G89.4] Chronic pain syndrome          ED Disposition Condition    Admit Stable                  [1]   Social History  Tobacco Use    Smoking status: Never    Smokeless tobacco: Never   Substance Use Topics    Alcohol use: No    Drug use: Yes     Types: Marijuana     Comment: periodically         Jessica Siu MD  03/26/25 0136

## 2025-03-25 NOTE — ED NOTES
Care assumed. pt resting quietly, Resp. even and unlabored, NAD noted, Call light within reach, SR x1, Bed in lowest position, Will monitor.

## 2025-03-25 NOTE — HPI
HPI by Dr. 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 medications. Denies any trauma. Pt denies any other sx such as fever, cough, abd pain or chest pain.     In ED /109. Other vitals stable. Hb 9.7. Retic 1.1. UA unremarkable. Pt given multiple doses of dilaudid, benadryl 1L fluids and zofran. Admitted to hospital medicine for further mngx.

## 2025-03-25 NOTE — ED NOTES
Attempted to call report x2   Visit Information Date & Time Provider Department Dept. Phone Encounter #  
 8/15/2017 10:00 AM Ld Rosales MD [facility] [place]  [phone] [id number] Upcoming Health Maintenance Date Due Hepatitis C Screening 1956 DTaP/Tdap/Td series (1 - Tdap) 4/9/1975 FOBT Q 1 YEAR AGE 50-75 4/9/2004 ZOSTER VACCINE AGE 60> 2/9/2014 INFLUENZA AGE 9 TO ADULT 8/1/2017 Allergies as of 8/15/2017  Review Complete On: 8/15/2017 By: Aga Harrison  
 No Known Allergies Current Immunizations  Never Reviewed No immunizations on file. Not reviewed this visit You Were Diagnosed With   
  
 Codes Comments Cardiac murmur    -  Primary ICD-10-CM: R01.1 ICD-9-CM: 040. 2 Vitals BP Pulse Height(growth percentile) Weight(growth percentile) SpO2 BMI  
 120/60 (BP 1 Location: Left arm, BP Patient Position: Sitting) 99 6' (1.829 m) 230 lb (104.3 kg) (!) 52% 31.19 kg/m2 Smoking Status Never Smoker Vitals History BMI and BSA Data Body Mass Index Body Surface Area  
 31.19 kg/m 2 2.3 m 2 Preferred Pharmacy Pharmacy Name Phone [id number] [address] [facility] [phone] Your Updated Medication List  
  
   
This list is accurate as of: 8/15/17 10:56 AM.  Always use your most recent med list.  
  
  
  
  
 BENADRYL 25 mg capsule Generic drug:  diphenhydrAMINE Take 25 mg by mouth every six (6) hours as needed. Cholecalciferol (Vitamin D3) 2,000 unit Cap capsule Commonly known as:  VITAMIN D3 Take 2,000 Units by mouth daily. empagliflozin 10 mg tablet Commonly known as:  Shelley Savage Take 10 mg by mouth daily. escitalopram oxalate 20 mg tablet Commonly known as:  Yusef Estrin Take 30 mg by mouth daily. Takes 30 mg daily EXCEDRIN PM  mg Tab Generic drug:  diphenhydrAMINE-acetaminophen Take 2 Tabs by mouth daily as needed. felodipine 5 mg 24 hr tablet Commonly known as:  PLENDIL SR Take 5 mg by mouth daily. Liraglutide 0.6 mg/0.1 mL (18 mg/3 mL) sub-q pen Commonly known as:  VICTOZA  
1.8 mg by SubCUTAneous route daily. quinapril 40 mg tablet Commonly known as:  ACCUPRIL Take 40 mg by mouth two (2) times a day. rosuvastatin 40 mg tablet Commonly known as:  CRESTOR Take 40 mg by mouth daily. NON FORMULARY SITagliptin-metFORMIN 50-1,000 mg per tablet Commonly known as:  Debbora Coma Take 1 Tab by mouth two (2) times daily (with meals). TOUJEO SOLOSTAR 300 unit/mL (1.5 mL) Inpn Generic drug:  insulin glargine 80 Units by SubCUTAneous route every morning. We Performed the Following AMB POC EKG ROUTINE W/ 12 LEADS, INTER & REP [22604 CPT(R)] Patient Instructions Follow up as needed Introducing [facility]! Dear Babs Banda: 
Thank you for requesting a LOVEFiLM account. Our records indicate that you already have an active LOVEFiLM account. You can access your account anytime at [email or web address] Did you know that you can access your hospital and ER discharge instructions at any time in LOVEFiLM? You can also review all of your test results from your hospital stay or ER visit. Additional Information If you have questions, please visit the Frequently Asked Questions section of the LOVEFiLM website at [email or web address] Remember, LOVEFiLM is NOT to be used for urgent needs. For medical emergencies, dial 911. Now available from your iPhone and Android! Please provide this summary of care documentation to your next provider. Your primary care clinician is listed as Abiel Banana. If you have any questions after today's visit, please call [phone].

## 2025-03-26 LAB
ALBUMIN SERPL BCP-MCNC: 3.7 G/DL (ref 3.5–5.2)
ALP SERPL-CCNC: 90 UNIT/L (ref 40–150)
ALT SERPL W/O P-5'-P-CCNC: 17 UNIT/L (ref 10–44)
ANION GAP (OHS): 10 MMOL/L (ref 8–16)
AST SERPL-CCNC: 25 UNIT/L (ref 11–45)
BILIRUB SERPL-MCNC: 0.9 MG/DL (ref 0.1–1)
BUN SERPL-MCNC: 12 MG/DL (ref 6–20)
CALCIUM SERPL-MCNC: 9.6 MG/DL (ref 8.7–10.5)
CHLORIDE SERPL-SCNC: 106 MMOL/L (ref 95–110)
CO2 SERPL-SCNC: 23 MMOL/L (ref 23–29)
CREAT SERPL-MCNC: 1.1 MG/DL (ref 0.5–1.4)
ERYTHROCYTE [DISTWIDTH] IN BLOOD BY AUTOMATED COUNT: 21.5 % (ref 11.5–14.5)
GFR SERPLBLD CREATININE-BSD FMLA CKD-EPI: >60 ML/MIN/1.73/M2
GLUCOSE SERPL-MCNC: 87 MG/DL (ref 70–110)
HCT VFR BLD AUTO: 27.2 % (ref 37–48.5)
HGB BLD-MCNC: 9 GM/DL (ref 12–16)
MCH RBC QN AUTO: 23.1 PG (ref 27–50)
MCHC RBC AUTO-ENTMCNC: 33.1 G/DL (ref 32–36)
MCV RBC AUTO: 70 FL (ref 82–98)
PLATELET # BLD AUTO: 224 K/UL (ref 150–450)
PLATELET BLD QL SMEAR: NORMAL
PMV BLD AUTO: 8.2 FL (ref 9.2–12.9)
POTASSIUM SERPL-SCNC: 3.9 MMOL/L (ref 3.5–5.1)
PROT SERPL-MCNC: 7.7 GM/DL (ref 6–8.4)
RBC # BLD AUTO: 3.89 M/UL (ref 4–5.4)
SODIUM SERPL-SCNC: 139 MMOL/L (ref 136–145)
WBC # BLD AUTO: 9.18 K/UL (ref 3.9–12.7)

## 2025-03-26 PROCEDURE — 63600175 PHARM REV CODE 636 W HCPCS: Performed by: STUDENT IN AN ORGANIZED HEALTH CARE EDUCATION/TRAINING PROGRAM

## 2025-03-26 PROCEDURE — 21400001 HC TELEMETRY ROOM

## 2025-03-26 PROCEDURE — 27000221 HC OXYGEN, UP TO 24 HOURS

## 2025-03-26 PROCEDURE — 25000003 PHARM REV CODE 250: Performed by: STUDENT IN AN ORGANIZED HEALTH CARE EDUCATION/TRAINING PROGRAM

## 2025-03-26 PROCEDURE — 85027 COMPLETE CBC AUTOMATED: CPT | Performed by: STUDENT IN AN ORGANIZED HEALTH CARE EDUCATION/TRAINING PROGRAM

## 2025-03-26 PROCEDURE — 94761 N-INVAS EAR/PLS OXIMETRY MLT: CPT

## 2025-03-26 PROCEDURE — 82040 ASSAY OF SERUM ALBUMIN: CPT | Performed by: STUDENT IN AN ORGANIZED HEALTH CARE EDUCATION/TRAINING PROGRAM

## 2025-03-26 PROCEDURE — 99900035 HC TECH TIME PER 15 MIN (STAT)

## 2025-03-26 RX ORDER — MUPIROCIN 20 MG/G
OINTMENT TOPICAL 2 TIMES DAILY
Status: DISPENSED | OUTPATIENT
Start: 2025-03-26 | End: 2025-03-31

## 2025-03-26 RX ORDER — ENOXAPARIN SODIUM 100 MG/ML
40 INJECTION SUBCUTANEOUS EVERY 24 HOURS
Status: DISCONTINUED | OUTPATIENT
Start: 2025-03-26 | End: 2025-03-27

## 2025-03-26 RX ORDER — MORPHINE SULFATE 4 MG/ML
17 INJECTION, SOLUTION INTRAMUSCULAR; INTRAVENOUS
Refills: 0 | Status: DISCONTINUED | OUTPATIENT
Start: 2025-03-26 | End: 2025-03-27

## 2025-03-26 RX ORDER — DIPHENHYDRAMINE HYDROCHLORIDE 50 MG/ML
50 INJECTION, SOLUTION INTRAMUSCULAR; INTRAVENOUS ONCE
Status: COMPLETED | OUTPATIENT
Start: 2025-03-26 | End: 2025-03-27

## 2025-03-26 RX ADMIN — ENOXAPARIN SODIUM 40 MG: 40 INJECTION SUBCUTANEOUS at 07:03

## 2025-03-26 RX ADMIN — MORPHINE SULFATE 17 MG: 4 INJECTION INTRAVENOUS at 01:03

## 2025-03-26 RX ADMIN — SODIUM CHLORIDE, POTASSIUM CHLORIDE, SODIUM LACTATE AND CALCIUM CHLORIDE: 600; 310; 30; 20 INJECTION, SOLUTION INTRAVENOUS at 07:03

## 2025-03-26 RX ADMIN — DIPHENHYDRAMINE HYDROCHLORIDE 25 MG: 50 INJECTION INTRAMUSCULAR; INTRAVENOUS at 03:03

## 2025-03-26 RX ADMIN — MORPHINE SULFATE 30 MG: 15 TABLET, FILM COATED, EXTENDED RELEASE ORAL at 09:03

## 2025-03-26 RX ADMIN — ACETAMINOPHEN 1000 MG: 500 TABLET ORAL at 03:03

## 2025-03-26 RX ADMIN — DIPHENHYDRAMINE HYDROCHLORIDE 25 MG: 50 INJECTION INTRAMUSCULAR; INTRAVENOUS at 07:03

## 2025-03-26 RX ADMIN — GABAPENTIN 600 MG: 300 CAPSULE ORAL at 09:03

## 2025-03-26 RX ADMIN — MORPHINE SULFATE 17 MG: 4 INJECTION INTRAVENOUS at 07:03

## 2025-03-26 RX ADMIN — MORPHINE SULFATE 17 MG: 4 INJECTION INTRAVENOUS at 03:03

## 2025-03-26 RX ADMIN — GABAPENTIN 600 MG: 300 CAPSULE ORAL at 03:03

## 2025-03-26 RX ADMIN — DIPHENHYDRAMINE HYDROCHLORIDE 25 MG: 50 INJECTION INTRAMUSCULAR; INTRAVENOUS at 09:03

## 2025-03-26 RX ADMIN — SODIUM CHLORIDE, POTASSIUM CHLORIDE, SODIUM LACTATE AND CALCIUM CHLORIDE: 600; 310; 30; 20 INJECTION, SOLUTION INTRAVENOUS at 09:03

## 2025-03-26 RX ADMIN — MORPHINE SULFATE 17 MG: 4 INJECTION INTRAVENOUS at 12:03

## 2025-03-26 RX ADMIN — FLUOXETINE HYDROCHLORIDE 80 MG: 20 CAPSULE ORAL at 09:03

## 2025-03-26 RX ADMIN — DIPHENHYDRAMINE HYDROCHLORIDE 25 MG: 50 INJECTION INTRAMUSCULAR; INTRAVENOUS at 12:03

## 2025-03-26 RX ADMIN — TIZANIDINE 4 MG: 2 TABLET ORAL at 04:03

## 2025-03-26 RX ADMIN — ACETAMINOPHEN 1000 MG: 500 TABLET ORAL at 05:03

## 2025-03-26 RX ADMIN — AMLODIPINE BESYLATE 10 MG: 5 TABLET ORAL at 09:03

## 2025-03-26 RX ADMIN — MORPHINE SULFATE 30 MG: 15 TABLET, FILM COATED, EXTENDED RELEASE ORAL at 03:03

## 2025-03-26 RX ADMIN — DIPHENHYDRAMINE HYDROCHLORIDE 25 MG: 50 INJECTION INTRAMUSCULAR; INTRAVENOUS at 01:03

## 2025-03-26 RX ADMIN — FOLIC ACID 1 MG: 1 TABLET ORAL at 09:03

## 2025-03-26 RX ADMIN — PANTOPRAZOLE SODIUM 40 MG: 40 TABLET, DELAYED RELEASE ORAL at 09:03

## 2025-03-26 RX ADMIN — MORPHINE SULFATE 17 MG: 4 INJECTION INTRAVENOUS at 09:03

## 2025-03-26 NOTE — ASSESSMENT & PLAN NOTE
Sickle cell crisis vs worsening of chronic pain     Right ankle - 'There is an osteochondral defect of the medial talar dome. Ankle mortise is preserved. There are spurs on the calcaneus. There is an os trigonum. No acute fracture, dislocation, or bone destruction seen. 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 There is a joint effusion. There is DJD. 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 No fracture, dislocation, or bone destruction seen.'   Right hip - 'No fracture, dislocation, or bone destruction seen. '    Plan -   - Prefers morphine over dilaudid due to severe itching with dilaudid   - Morphine + benadryl PRN   - Cont home oral pain meds   - Cont home gabapentin   - Cont folic acid   - Ortho consult   - IV fluids

## 2025-03-26 NOTE — PLAN OF CARE
Case Management Assessment     PCP: No Primary Doctor  Pharmacy: [facility]    Patient Arrived From: home  Existing Help at Home: family    Barriers to Discharge: none    Discharge Plan:    A. Home with family   B. Home with family      Assessment completed with patient at bedside---patient alert and oriented. Patient lives with mother and daughter, patient independent in ADLs. Patient will transport self home.        [facility] ([address])  Initial Discharge Assessment       Primary Care Provider: No, Primary Doctor    Admission Diagnosis: Chronic pain syndrome [G89.4]  Chest pain [R07.9]    Admission Date: 3/25/2025  Expected Discharge Date:     Transition of Care Barriers: None    Payor: [employer] MANAGED MEDICARE / Plan: [employer] MEDICARE PLAN HMO / Product Type: Medicare Advantage /     Extended Emergency Contact Information  Primary Emergency Contact: Jack Malone  Mobile Phone: [phone]  Relation: Brother  Secondary Emergency Contact: Timoteo Malone  Mobile Phone: [phone]  Relation: Daughter    Discharge Plan A: Home with family  Discharge Plan B: Home with family      [facility] [address] [address]  Phone: [phone] Fax: [phone]      Initial Assessment (most recent)       Adult Discharge Assessment - 03/26/25 1444          Discharge Assessment    Assessment Type Discharge Planning Assessment     Confirmed/corrected address, phone number and insurance Yes     Confirmed Demographics Correct on Facesheet     Source of Information patient     Communicated ALYSE with patient/caregiver Date not available/Unable to determine     People in Home child(jayce), adult;grandchild(jayce);parent(s)     Do you expect to return to your current living situation? Yes     Do you have help at home or someone to help you manage your care at home? No     Prior to hospitilization cognitive status: Alert/Oriented     Current cognitive status: Alert/Oriented      Walking or Climbing Stairs Difficulty no     Dressing/Bathing Difficulty no     Equipment Currently Used at Home oxygen     Readmission within 30 days? Yes     Patient currently being followed by outpatient case management? No     Do you currently have service(s) that help you manage your care at home? No     Do you take prescription medications? Yes     Do you have prescription coverage? Yes     Coverage [employer] MM     Do you have any problems affording any of your prescribed medications? No     Is the patient taking medications as prescribed? yes     Who is going to help you get home at discharge? self     How do you get to doctors appointments? car, drives self     Are you on dialysis? No     Do you take coumadin? No     Discharge Plan A Home with family     Discharge Plan B Home with family     DME Needed Upon Discharge  none     Discharge Plan discussed with: Patient     Transition of Care Barriers None        OTHER    Name(s) of People in Home Phyllis (mother), Timoteo (daughter) and granddaughter                     Readmission Assessment (most recent)       Readmission Assessment - 03/26/25 1443          Readmission    Was this a planned readmission? No     Why were you hospitalized in the last 30 days? sickle cell     Why were you readmitted? Related to previous admission     When you left the hospital how did you feel? still had some pain     When you left the hospital where did you go? Home with Family     Did patient/caregiver refused recommended DC plan? No

## 2025-03-26 NOTE — PROGRESS NOTES
Pharmacist Renal Dose Adjustment Note    Nazanin Malone is a 44 y.o. female being treated with the medication loenox    Patient Data:    Vital Signs (Most Recent):  Temp: 98.4 °F (36.9 °C) (03/26/25 0509)  Pulse: 82 (03/26/25 0708)  Resp: 20 (03/26/25 0509)  BP: 136/81 (03/26/25 0509)  SpO2: (!) 93 % (03/26/25 0509) Vital Signs (72h Range):  Temp:  [98 °F (36.7 °C)-99 °F (37.2 °C)]   Pulse:  [74-89]   Resp:  [17-80]   BP: (123-184)/()   SpO2:  [93 %-100 %]      Recent Labs   Lab 03/25/25  0446 03/26/25  0344   CREATININE 1.0 1.1     Serum creatinine: 1.1 mg/dL 03/26/25 0344  Estimated creatinine clearance: 68.4 mL/min    Medication:lovenox dose: 40 mg frequency every 12 hours will be changed to medication:lovenox dose:40 mg frequency:every 24 hours    Pharmacist's Name: Jessica Floyd  Pharmacist's Extension: [phone]

## 2025-03-26 NOTE — PROGRESS NOTES
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 medications. Denies any trauma. Pt denies any other sx such as fever, cough, abd pain or chest pain.     In ED /109. Other vitals stable. Hb 9.7. Retic 1.1. UA unremarkable. Pt given multiple doses of dilaudid, benadryl 1L fluids and zofran. Admitted to hospital medicine for further mngx.     Overview/Hospital Course:  Pt still with severe pain in right lower extremity joints. Awaiting ortho eval. Cont pain mngx.      Interval History: Having itching. Continues to have pain.     Review of Systems   Constitutional:  Negative for fever.   Respiratory:  Negative for shortness of breath.    Cardiovascular:  Negative for chest pain.   Gastrointestinal:  Negative for abdominal pain.   Musculoskeletal:  Positive for arthralgias.   Psychiatric/Behavioral:  Negative for agitation and confusion.      Objective:     Vital Signs (Most Recent):  Temp: 98.3 °F (36.8 °C) (03/26/25 1124)  Pulse: 89 (03/26/25 1457)  Resp: 17 (03/26/25 1226)  BP: 113/72 (03/26/25 1124)  SpO2: 95 % (03/26/25 1124) Vital Signs (24h Range):  Temp:  [98 °F (36.7 °C)-98.6 °F (37 °C)] 98.3 °F (36.8 °C)  Pulse:  [74-89] 89  Resp:  [16-20] 17  SpO2:  [93 %-99 %] 95 %  BP: (113-157)/(64-88) 113/72     Weight: 94.3 kg (208 lb)  Body mass index is 38.04 kg/m².    Intake/Output Summary (Last 24 hours) at 3/26/2025 1459  Last data  filed at 3/26/2025 1345  Gross per 24 hour   Intake 180 ml   Output 0 ml   Net 180 ml         Physical Exam  Constitutional:       General: She is in acute distress (from pain).   Pulmonary:      Effort: No respiratory distress.      Breath sounds: No wheezing.   Abdominal:      General: There is no distension.      Tenderness: There is no abdominal tenderness.   Musculoskeletal:      Comments: Right ankle, knee and hip pain. No overlying skin changes    Neurological:      General: No focal deficit present.      Mental Status: She is oriented to person, place, and time.               Significant Labs: All pertinent labs within the past 24 hours have been reviewed.    Significant Imaging: I have reviewed all pertinent imaging results/findings within the past 24 hours.      Assessment & Plan  Acute pain of right lower extremity  Sickle cell crisis vs worsening of chronic pain     Right ankle - 'There is an osteochondral defect of the medial talar dome. Ankle mortise is preserved. There are spurs on the calcaneus. There is an os trigonum. No acute fracture, dislocation, or bone destruction seen. 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 There is a joint effusion. There is DJD. 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 No fracture, dislocation, or bone destruction seen.'   Right hip - 'No fracture, dislocation, or bone destruction seen. '    Plan -   - Prefers morphine over dilaudid due to severe itching with dilaudid   - Morphine + benadryl PRN   - Cont home oral pain meds   - Cont home gabapentin   - Cont folic acid   - Ortho consult   - IV fluids     Hypertension  - cont home amlodipine and prazosin   Anxiety and depression  - cont home fluoxetine       History of pulmonary embolism  - cont lovenox     VTE Risk Mitigation (From admission, onward)           Ordered     enoxaparin injection 40 mg  Daily         03/26/25 0730     IP  VTE HIGH RISK PATIENT  Once         03/25/25 1348     Place sequential compression device  Until discontinued         03/25/25 1348     Reason for No Pharmacological VTE Prophylaxis  Once        Question:  Reasons:  Answer:  Already adequately anticoagulated on oral Anticoagulants    03/25/25 1348                    Discharge Planning   ALYSE:      Code Status: Full Code   Medical Readiness for Discharge Date:   Discharge Plan A: Home with family                Please place Justification for DME        Sherine Foreman MD  Department of Hospital Medicine   [facility] ([address])

## 2025-03-26 NOTE — PLAN OF CARE
03/26/25 1443   Readmission   Was this a planned readmission? No   Why were you hospitalized in the last 30 days? sickle cell   Why were you readmitted? Related to previous admission   When you left the hospital how did you feel? still had some pain   When you left the hospital where did you go? Home with Family   Did patient/caregiver refused recommended DC plan? No

## 2025-03-26 NOTE — HOSPITAL COURSE
Patient admitted with HbSS crisis complicated by RLE joint pain.  Treated with IVF, pain control. Imaging was done and showed tricompartmental osteoarthritis of the right knee.  PT/OT consulted, recommended outpatient PT and ambulate with a cane.  Orthopedic surgery evaluated patient and recommended outpatient follow up.      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 Pt used to be on eliquis but changed to lovenox due to too expensive and not covered by insurance. Now pt cannot afford the Lovenox either and has been off this. Discussed with primary hematologist Dr. Stern and ok with starting warfarin. Monitor INR and set up with coumadin clinic on discharge.  Patient's INR went up rapidly and dose of warfarin decreased and then had to be held for a couple of days.  Resumed 4/4 at 2.5 mg daily and INR was 2.4 on discharge.    Patient complained of LUE pain and shooting pains in her chest.  EKG showed NSR and mildly prolonged QTc (486).  Troponin and CPK were normal.  Ultrasound of the LUE was ordered as she felt the pain was similar to when she had the LUE blood clot.  Ultrasound findings were notable for a new acute DVT in the right IJ and the chronic non occlusive thrombus in the left IJ.  Discussed with hem/onc via secure chat, recommended to continue warfarin and follow up closely in Coumadin clinic.  Patient was seen and examined prior to discharge and was feeling much improved.

## 2025-03-26 NOTE — SUBJECTIVE & OBJECTIVE
Interval History: Having itching. Continues to have pain.     Review of Systems   Constitutional:  Negative for fever.   Respiratory:  Negative for shortness of breath.    Cardiovascular:  Negative for chest pain.   Gastrointestinal:  Negative for abdominal pain.   Musculoskeletal:  Positive for arthralgias.   Psychiatric/Behavioral:  Negative for agitation and confusion.      Objective:     Vital Signs (Most Recent):  Temp: 98.3 °F (36.8 °C) (03/26/25 1124)  Pulse: 89 (03/26/25 1457)  Resp: 17 (03/26/25 1226)  BP: 113/72 (03/26/25 1124)  SpO2: 95 % (03/26/25 1124) Vital Signs (24h Range):  Temp:  [98 °F (36.7 °C)-98.6 °F (37 °C)] 98.3 °F (36.8 °C)  Pulse:  [74-89] 89  Resp:  [16-20] 17  SpO2:  [93 %-99 %] 95 %  BP: (113-157)/(64-88) 113/72     Weight: 94.3 kg (208 lb)  Body mass index is 38.04 kg/m².    Intake/Output Summary (Last 24 hours) at 3/26/2025 1458  Last data filed at 3/26/2025 1345  Gross per 24 hour   Intake 180 ml   Output 0 ml   Net 180 ml         Physical Exam  Constitutional:       General: She is in acute distress (from pain).   Pulmonary:      Effort: No respiratory distress.      Breath sounds: No wheezing.   Abdominal:      General: There is no distension.      Tenderness: There is no abdominal tenderness.   Musculoskeletal:      Comments: Right ankle, knee and hip pain. No overlying skin changes    Neurological:      General: No focal deficit present.      Mental Status: She is oriented to person, place, and time.               Significant Labs: All pertinent labs within the past 24 hours have been reviewed.    Significant Imaging: I have reviewed all pertinent imaging results/findings within the past 24 hours.

## 2025-03-26 NOTE — PLAN OF CARE
Problem: Infection  Goal: Absence of Infection Signs and Symptoms  Outcome: Progressing     Problem: Acute Kidney Injury/Impairment  Goal: Effective Renal Function  Outcome: Progressing     Problem: Acute Kidney Injury/Impairment  Goal: Improved Oral Intake  Outcome: Progressing     Problem: Adult Inpatient Plan of Care  Goal: Optimal Comfort and Wellbeing  Outcome: Progressing

## 2025-03-26 NOTE — CONSULTS
Consult service-orthopaedic surgery  Consult from Internal Medicine, Dr. Renea Naik - Terrance Rendon MD       Principal Problem:Acute pain of right lower extremity     Chief Complaint:        Chief Complaint   Patient presents with    Sickle Cell Pain Crisis       R hip, R leg sickle cell pain x4 days with n/v abd pain. Pt received 2 mg Dilaudid injection at pain clinic yesterday evening without relief. Home Morphine ER and [place] attempted at midnight w/o relief. Denies sob, cp.          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 medications.     Denies any trauma. Pt denies any other sx such as fever, cough, abd pain or chest pain.      In ED /109. Other vitals stable. Hb 9.7. Retic 1.1. UA unremarkable. Pt given multiple doses of dilaudid, benadryl 1L fluids and zofran. Admitted to hospital medicine for further mngx.     SHe relates right knee pain, right hip pain, and right ankle pain.  She has a history of rhabdomyolysis and surgical intervention due to ulceration over her right lateral ankle last year.  No recent injuries or events have occurred.  She does relate a remote history of meniscus tear.          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/29/2017    Asthma      Avascular necrosis of femur      Depression      History of pulmonary embolism      Hypertension      Morbid obesity      Opioid dependence 04/16/2017    Pneumonia due to Streptococcus pneumoniae 04/25/2017    Right lower lobe pneumonia 04/29/2017    Sepsis due to Streptococcus pneumoniae 04/25/2017    Sickle cell-beta thalassemia disease with pain      Trigeminal neuralgia                 Past  Surgical History:   Procedure Laterality Date     SECTION        ESOPHAGOGASTRODUODENOSCOPY N/A 2024     Procedure: EGD (ESOPHAGOGASTRODUODENOSCOPY);  Surgeon: Allen Marshall MD;  Location: [address];  Service: Gastroenterology;  Laterality: N/A;    TONSILLECTOMY        TUBAL LIGATION                  Review of patient's allergies indicates:   Allergen Reactions    Cat dander Anaphylaxis, Itching and Shortness Of Breath    Nsaids (non-steroidal anti-inflammatory drug) Itching and Anaphylaxis    Latex Rash             Current Facility-Administered Medications on File Prior to Encounter   Medication    [COMPLETED] HYDROmorphone (PF) injection 1 mg    [COMPLETED] sodium chloride 0.9% bolus 1,000 mL 1,000 mL    [DISCONTINUED] alteplase injection 2 mg    [DISCONTINUED] heparin, porcine (PF) 100 unit/mL injection flush 500 Units    [DISCONTINUED] sodium chloride 0.9% flush 10 mL           Current Outpatient Medications on File Prior to Encounter   Medication Sig    acetaminophen (TYLENOL) 325 MG tablet Take 2 tablets (650 mg total) by mouth every 8 (eight) hours as needed for Pain.    albuterol (VENTOLIN HFA) 90 mcg/actuation inhaler Inhale 2 puffs into the lungs every 6 (six) hours as needed for Wheezing or Shortness of Breath. Rescue    amLODIPine (NORVASC) 10 MG tablet Take 1 tablet (10 mg total) by mouth once daily.    ascorbic acid (VITAMIN C ORAL) Take 1 capsule by mouth 2 (two) times a day.    enoxaparin (LOVENOX) 40 mg/0.4 mL Syrg Inject 0.4 mLs (40 mg total) into the skin every 12 (twelve) hours.    FLUoxetine 40 MG capsule Take 2 capsules (80 mg total) by mouth once daily.    fluticasone propionate (FLONASE) 50 mcg/actuation nasal spray INSTILL 1 SPRAY INTO EACH NOSTRIL EVERY DAY    folic acid (FOLVITE) 1 MG tablet Take 1 tablet (1 mg total) by mouth once daily.    gabapentin (NEURONTIN) 300 MG capsule Take 2 capsules (600 mg total) by mouth 3 (three) times daily.     HYDROcodone-acetaminophen (NORCO)  mg per tablet Take 1 tablet by mouth every 4 to 6 hours as needed for Pain.    hydroxyurea (HYDREA) 500 mg Cap Take 2 capsules (1,000 mg total) by mouth once daily. (Patient not taking: Reported on 3/18/2025.)    metoclopramide HCl (REGLAN) 5 MG tablet Take 5 mg by mouth 4 (four) times daily.    morphine (MS CONTIN) 30 MG 12 hr tablet Take 1 tablet (30 mg total) by mouth 3 (three) times daily.    naloxone (NARCAN) 4 mg/actuation Spry 4mg by nasal route as needed for opioid overdose; may repeat every 2-3 minutes in alternating nostrils until medical help arrives. Call 911    pantoprazole (PROTONIX) 40 MG tablet Take 1 tablet (40 mg total) by mouth 2 (two) times daily.    prazosin (MINIPRESS) 1 MG Cap Take 1 capsule (1 mg total) by mouth every evening.    senna-docusate 8.6-50 mg (PERICOLACE) 8.6-50 mg per tablet Take 1 tablet by mouth daily as needed for Constipation.    sucralfate (CARAFATE) 1 gram tablet Take 1 tablet (1 g total) by mouth 4 (four) times daily before meals and nightly.    [] tiZANidine (ZANAFLEX) 4 MG tablet Take 1 tablet (4 mg total) by mouth every 8 (eight) hours. for 10 days      Family History         Problem Relation (Age of Onset)     Diabetes Mother     Heart disease Mother, Father                   Tobacco Use    Smoking status: Never    Smokeless tobacco: Never   Substance and Sexual Activity    Alcohol use: No    Drug use: Yes       Types: Marijuana       Comment: periodically     Sexual activity: Not Currently       Birth control/protection: See Surgical Hx      Review of Systems   Constitutional:  Negative for fever.   Respiratory:  Negative for shortness of breath.    Cardiovascular:  Negative for chest pain.   Gastrointestinal:  Negative for abdominal pain.   Musculoskeletal:  Positive for arthralgias.   Psychiatric/Behavioral:  Negative for agitation and confusion.       Objective:         Weight: 94.3 kg (208 lb)  Body mass index is  38.04 kg/m².     Physical Exam  Constitutional:       General: She is in pain).   Pulmonary:      Effort: No respiratory distress.      Breath sounds: No wheezing.   Abdominal:      General: There is no distension.      Tenderness: There is no abdominal tenderness.   Musculoskeletal:      Comments: Right ankle, knee and hip pain. No overlying skin changes.    Well-healed incisions over the lateral ankle and lateral gastrocnemius.    Mildly Limited range of motion and global tenderness to palpation right knee.  Small Right knee effusion.  No instability.  No erythema.  No clinical signs of septic arthritis.  No instability with varus or valgus stress testing.  Neurological:      General: No focal deficit present.      Mental Status: She is oriented to person, place, and time.                  Significant Labs: All pertinent labs within the past 24 hours have been reviewed.     Significant Imaging: I have reviewed all pertinent imaging results/findings within the past 24 hours.    X-rays right knee March 25, 2025-tricompartmental arthritic changes with large osteochondral loose bodies in the superior aspect of the joint.  There is tricompartmental osteophyte formation.        Assessment/Plan:    44-year-old female with sickle cell with tricompartmental right knee osteoarthritis.      She has recurrent right knee effusions.  She is currently admitted for pain control.  She can mobilize with physical therapy.  She may need further intervention for her right knee in the future.      She also has a history of rhabdomyolysis with surgery over her right ankle and right lateral calf.  She also has right hip pain.      She will need physical therapy for mobilization and strengthening.  She can follow-up as an outpatient for further management and treatment of her complaints.  She has right knee osteoarthritis and she will need to consider conservative measures, which would entail injections, physical therapy, and  bracing.    F/U DR> RON CARROLL    [phone].             * Acute pain of right lower extremity  Sickle cell crisis vs worsening of chronic pain        History of pulmonary embolism      Anxiety and depressio         Hypertension      VTE Risk Mitigation (From admission, onward)              Ordered       enoxaparin injection 40 mg  Every 12 hours         03/25/25 1348       IP VTE HIGH RISK PATIENT  Once         03/25/25 1348       Place sequential compression device  Until discontinued         03/25/25 1348       Reason for No Pharmacological VTE Prophylaxis  Once        Question:  Reasons:  Answer:  Already adequately anticoagulated on oral Anticoagulants    03/25/25 1348

## 2025-03-27 LAB
ALBUMIN SERPL BCP-MCNC: 3.9 G/DL (ref 3.5–5.2)
ALP SERPL-CCNC: 92 UNIT/L (ref 40–150)
ALT SERPL W/O P-5'-P-CCNC: 49 UNIT/L (ref 10–44)
ANION GAP (OHS): 11 MMOL/L (ref 8–16)
AST SERPL-CCNC: 69 UNIT/L (ref 11–45)
BILIRUB SERPL-MCNC: 0.7 MG/DL (ref 0.1–1)
BUN SERPL-MCNC: 20 MG/DL (ref 6–20)
CALCIUM SERPL-MCNC: 9.2 MG/DL (ref 8.7–10.5)
CHLORIDE SERPL-SCNC: 105 MMOL/L (ref 95–110)
CO2 SERPL-SCNC: 21 MMOL/L (ref 23–29)
CREAT SERPL-MCNC: 1.5 MG/DL (ref 0.5–1.4)
ERYTHROCYTE [DISTWIDTH] IN BLOOD BY AUTOMATED COUNT: 22.2 % (ref 11.5–14.5)
GFR SERPLBLD CREATININE-BSD FMLA CKD-EPI: 43 ML/MIN/1.73/M2
GLUCOSE SERPL-MCNC: 117 MG/DL (ref 70–110)
HCT VFR BLD AUTO: 27.9 % (ref 37–48.5)
HGB BLD-MCNC: 8.9 GM/DL (ref 12–16)
INR PPP: 1 (ref 0.8–1.2)
MCH RBC QN AUTO: 23.1 PG (ref 27–50)
MCHC RBC AUTO-ENTMCNC: 31.9 G/DL (ref 32–36)
MCV RBC AUTO: 72 FL (ref 82–98)
PLATELET # BLD AUTO: 250 K/UL (ref 150–450)
PMV BLD AUTO: 8.7 FL (ref 9.2–12.9)
POTASSIUM SERPL-SCNC: 4.2 MMOL/L (ref 3.5–5.1)
PROT SERPL-MCNC: 8.2 GM/DL (ref 6–8.4)
PROTHROMBIN TIME: 11 SECONDS (ref 9–12.5)
RBC # BLD AUTO: 3.86 M/UL (ref 4–5.4)
SODIUM SERPL-SCNC: 137 MMOL/L (ref 136–145)
WBC # BLD AUTO: 8.52 K/UL (ref 3.9–12.7)

## 2025-03-27 PROCEDURE — 21400001 HC TELEMETRY ROOM

## 2025-03-27 PROCEDURE — 63600175 PHARM REV CODE 636 W HCPCS: Performed by: STUDENT IN AN ORGANIZED HEALTH CARE EDUCATION/TRAINING PROGRAM

## 2025-03-27 PROCEDURE — 27000221 HC OXYGEN, UP TO 24 HOURS

## 2025-03-27 PROCEDURE — 94761 N-INVAS EAR/PLS OXIMETRY MLT: CPT

## 2025-03-27 PROCEDURE — 25000003 PHARM REV CODE 250: Performed by: STUDENT IN AN ORGANIZED HEALTH CARE EDUCATION/TRAINING PROGRAM

## 2025-03-27 PROCEDURE — 85027 COMPLETE CBC AUTOMATED: CPT | Performed by: STUDENT IN AN ORGANIZED HEALTH CARE EDUCATION/TRAINING PROGRAM

## 2025-03-27 PROCEDURE — 99900035 HC TECH TIME PER 15 MIN (STAT)

## 2025-03-27 PROCEDURE — 85610 PROTHROMBIN TIME: CPT | Performed by: STUDENT IN AN ORGANIZED HEALTH CARE EDUCATION/TRAINING PROGRAM

## 2025-03-27 PROCEDURE — 80053 COMPREHEN METABOLIC PANEL: CPT | Performed by: STUDENT IN AN ORGANIZED HEALTH CARE EDUCATION/TRAINING PROGRAM

## 2025-03-27 PROCEDURE — 97165 OT EVAL LOW COMPLEX 30 MIN: CPT

## 2025-03-27 RX ORDER — WARFARIN SODIUM 5 MG/1
5 TABLET ORAL DAILY
Status: DISCONTINUED | OUTPATIENT
Start: 2025-03-27 | End: 2025-03-29

## 2025-03-27 RX ORDER — MORPHINE SULFATE 10 MG/ML
10 INJECTION INTRAMUSCULAR; INTRAVENOUS; SUBCUTANEOUS
Status: DISCONTINUED | OUTPATIENT
Start: 2025-03-27 | End: 2025-04-03

## 2025-03-27 RX ADMIN — GABAPENTIN 600 MG: 300 CAPSULE ORAL at 02:03

## 2025-03-27 RX ADMIN — SODIUM CHLORIDE, POTASSIUM CHLORIDE, SODIUM LACTATE AND CALCIUM CHLORIDE: 600; 310; 30; 20 INJECTION, SOLUTION INTRAVENOUS at 05:03

## 2025-03-27 RX ADMIN — MORPHINE SULFATE 10 MG: 10 INJECTION, SOLUTION INTRAMUSCULAR; INTRAVENOUS at 06:03

## 2025-03-27 RX ADMIN — MORPHINE SULFATE 17 MG: 4 INJECTION INTRAVENOUS at 12:03

## 2025-03-27 RX ADMIN — ACETAMINOPHEN 1000 MG: 500 TABLET ORAL at 05:03

## 2025-03-27 RX ADMIN — WARFARIN SODIUM 5 MG: 5 TABLET ORAL at 06:03

## 2025-03-27 RX ADMIN — MORPHINE SULFATE 10 MG: 10 INJECTION, SOLUTION INTRAMUSCULAR; INTRAVENOUS at 12:03

## 2025-03-27 RX ADMIN — AMLODIPINE BESYLATE 10 MG: 5 TABLET ORAL at 08:03

## 2025-03-27 RX ADMIN — PANTOPRAZOLE SODIUM 40 MG: 40 TABLET, DELAYED RELEASE ORAL at 12:03

## 2025-03-27 RX ADMIN — FOLIC ACID 1 MG: 1 TABLET ORAL at 08:03

## 2025-03-27 RX ADMIN — SODIUM CHLORIDE, POTASSIUM CHLORIDE, SODIUM LACTATE AND CALCIUM CHLORIDE: 600; 310; 30; 20 INJECTION, SOLUTION INTRAVENOUS at 03:03

## 2025-03-27 RX ADMIN — DIPHENHYDRAMINE HYDROCHLORIDE 25 MG: 50 INJECTION INTRAMUSCULAR; INTRAVENOUS at 05:03

## 2025-03-27 RX ADMIN — DIPHENHYDRAMINE HYDROCHLORIDE 25 MG: 50 INJECTION INTRAMUSCULAR; INTRAVENOUS at 08:03

## 2025-03-27 RX ADMIN — DIPHENHYDRAMINE HYDROCHLORIDE 25 MG: 50 INJECTION INTRAMUSCULAR; INTRAVENOUS at 06:03

## 2025-03-27 RX ADMIN — TIZANIDINE 4 MG: 2 TABLET ORAL at 06:03

## 2025-03-27 RX ADMIN — FLUOXETINE HYDROCHLORIDE 80 MG: 20 CAPSULE ORAL at 08:03

## 2025-03-27 RX ADMIN — PRAZOSIN HYDROCHLORIDE 1 MG: 1 CAPSULE ORAL at 12:03

## 2025-03-27 RX ADMIN — DIPHENHYDRAMINE HYDROCHLORIDE 25 MG: 50 INJECTION INTRAMUSCULAR; INTRAVENOUS at 11:03

## 2025-03-27 RX ADMIN — DIPHENHYDRAMINE HYDROCHLORIDE 25 MG: 50 INJECTION INTRAMUSCULAR; INTRAVENOUS at 03:03

## 2025-03-27 RX ADMIN — ACETAMINOPHEN 1000 MG: 500 TABLET ORAL at 11:03

## 2025-03-27 RX ADMIN — TIZANIDINE 4 MG: 2 TABLET ORAL at 08:03

## 2025-03-27 RX ADMIN — MORPHINE SULFATE 10 MG: 10 INJECTION, SOLUTION INTRAMUSCULAR; INTRAVENOUS at 08:03

## 2025-03-27 RX ADMIN — GABAPENTIN 600 MG: 300 CAPSULE ORAL at 08:03

## 2025-03-27 RX ADMIN — MORPHINE SULFATE 30 MG: 15 TABLET, FILM COATED, EXTENDED RELEASE ORAL at 08:03

## 2025-03-27 RX ADMIN — ACETAMINOPHEN 1000 MG: 500 TABLET ORAL at 02:03

## 2025-03-27 RX ADMIN — MORPHINE SULFATE 30 MG: 15 TABLET, FILM COATED, EXTENDED RELEASE ORAL at 02:03

## 2025-03-27 RX ADMIN — MORPHINE SULFATE 10 MG: 10 INJECTION, SOLUTION INTRAMUSCULAR; INTRAVENOUS at 03:03

## 2025-03-27 RX ADMIN — DIPHENHYDRAMINE HYDROCHLORIDE 50 MG: 50 INJECTION INTRAMUSCULAR; INTRAVENOUS at 12:03

## 2025-03-27 RX ADMIN — DIPHENHYDRAMINE HYDROCHLORIDE 25 MG: 50 INJECTION INTRAMUSCULAR; INTRAVENOUS at 12:03

## 2025-03-27 RX ADMIN — PANTOPRAZOLE SODIUM 40 MG: 40 TABLET, DELAYED RELEASE ORAL at 08:03

## 2025-03-27 RX ADMIN — MORPHINE SULFATE 10 MG: 10 INJECTION, SOLUTION INTRAMUSCULAR; INTRAVENOUS at 11:03

## 2025-03-27 RX ADMIN — HYDROCODONE BITARTRATE AND ACETAMINOPHEN 1 TABLET: 10; 325 TABLET ORAL at 04:03

## 2025-03-27 RX ADMIN — MORPHINE SULFATE 17 MG: 4 INJECTION INTRAVENOUS at 05:03

## 2025-03-27 NOTE — ASSESSMENT & PLAN NOTE
PE x 2 and supposed to be on anticoagulation indefinitely    Pt used to be on eliquis but changed to lovenox due to too expensive and not covered by insurance. Now pt cannot afford the Lovenox either and has been off this. Discussed with primary hematologist Dr. Stern and ok with starting warfarin.

## 2025-03-27 NOTE — PLAN OF CARE
No OT goals established.      Problem: Occupational Therapy  Goal: Occupational Therapy Goal  Outcome: Met     Initial OT eval complete.  No AD or DME in use PTA though has access to shower chair.  Recommend use of shower chair.  No acute care or post acute OT needs identified.  Please re-consult if situation changes.  To discontinue OT services.

## 2025-03-27 NOTE — SUBJECTIVE & OBJECTIVE
Interval History: Pain slightly improving and with somnolence and hypoxia overnight weaning morphine.      Review of Systems   Constitutional:  Negative for fever.   Respiratory:  Negative for shortness of breath.    Cardiovascular:  Negative for chest pain.   Gastrointestinal:  Negative for abdominal pain.   Musculoskeletal:  Positive for arthralgias.   Psychiatric/Behavioral:  Negative for agitation and confusion.      Objective:     Vital Signs (Most Recent):  Temp: 98 °F (36.7 °C) (03/27/25 1054)  Pulse: 90 (03/27/25 1132)  Resp: 16 (03/27/25 1202)  BP: (!) 126/59 (03/27/25 1054)  SpO2: (!) 93 % (03/27/25 1054) Vital Signs (24h Range):  Temp:  [97.6 °F (36.4 °C)-99.6 °F (37.6 °C)] 98 °F (36.7 °C)  Pulse:  [] 90  Resp:  [14-20] 16  SpO2:  [93 %-98 %] 93 %  BP: (121-161)/(59-93) 126/59     Weight: 94.3 kg (208 lb)  Body mass index is 38.04 kg/m².    Intake/Output Summary (Last 24 hours) at 3/27/2025 1244  Last data filed at 3/27/2025 1216  Gross per 24 hour   Intake 200 ml   Output 0 ml   Net 200 ml         Physical Exam  Constitutional:       General: She is not in acute distress.  Pulmonary:      Effort: No respiratory distress.      Breath sounds: No wheezing.   Abdominal:      General: There is no distension.      Tenderness: There is no abdominal tenderness.   Musculoskeletal:      Comments: Right ankle, knee and hip pain. No overlying skin changes    Neurological:      General: No focal deficit present.      Mental Status: She is oriented to person, place, and time.               Significant Labs: All pertinent labs within the past 24 hours have been reviewed.    Significant Imaging: I have reviewed all pertinent imaging results/findings within the past 24 hours.

## 2025-03-27 NOTE — PROGRESS NOTES
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 medications. Denies any trauma. Pt denies any other sx such as fever, cough, abd pain or chest pain.     In ED /109. Other vitals stable. Hb 9.7. Retic 1.1. UA unremarkable. Pt given multiple doses of dilaudid, benadryl 1L fluids and zofran. Admitted to hospital medicine for further mngx.     Overview/Hospital Course:  Pt still with severe pain in right lower extremity joints. Right ankle - 'There is an osteochondral defect of the medial talar dome. Ankle mortise is preserved. There are spurs on the calcaneus. There is an os trigonum. 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 There is a joint effusion. There is DJD. 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 No fracture, dislocation, or bone destruction seen.' Right hip - 'No fracture, dislocation, or bone destruction seen.' Ortho eval - no acute interventions inpatient but will need follow up for tricompartmental OA of right knee. Cont pain mngx. Wean as able.     Interval History: Pain slightly improving and with somnolence and hypoxia overnight weaning morphine.      Review of Systems   Constitutional:  Negative for fever.   Respiratory:  Negative for shortness of breath.    Cardiovascular:   Negative for chest pain.   Gastrointestinal:  Negative for abdominal pain.   Musculoskeletal:  Positive for arthralgias.   Psychiatric/Behavioral:  Negative for agitation and confusion.      Objective:     Vital Signs (Most Recent):  Temp: 98 °F (36.7 °C) (03/27/25 1054)  Pulse: 90 (03/27/25 1132)  Resp: 16 (03/27/25 1202)  BP: (!) 126/59 (03/27/25 1054)  SpO2: (!) 93 % (03/27/25 1054) Vital Signs (24h Range):  Temp:  [97.6 °F (36.4 °C)-99.6 °F (37.6 °C)] 98 °F (36.7 °C)  Pulse:  [] 90  Resp:  [14-20] 16  SpO2:  [93 %-98 %] 93 %  BP: (121-161)/(59-93) 126/59     Weight: 94.3 kg (208 lb)  Body mass index is 38.04 kg/m².    Intake/Output Summary (Last 24 hours) at 3/27/2025 1244  Last data filed at 3/27/2025 1216  Gross per 24 hour   Intake 200 ml   Output 0 ml   Net 200 ml         Physical Exam  Constitutional:       General: She is not in acute distress.  Pulmonary:      Effort: No respiratory distress.      Breath sounds: No wheezing.   Abdominal:      General: There is no distension.      Tenderness: There is no abdominal tenderness.   Musculoskeletal:      Comments: Right ankle, knee and hip pain. No overlying skin changes    Neurological:      General: No focal deficit present.      Mental Status: She is oriented to person, place, and time.               Significant Labs: All pertinent labs within the past 24 hours have been reviewed.    Significant Imaging: I have reviewed all pertinent imaging results/findings within the past 24 hours.      Assessment & Plan  Acute pain of right lower extremity  Sickle cell crisis vs worsening of chronic pain     Right ankle - 'There is an osteochondral defect of the medial talar dome. Ankle mortise is preserved. There are spurs on the calcaneus. There is an os trigonum. No acute fracture, dislocation, or bone destruction seen. 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 There is a joint effusion. There is DJD. There is  chondromalacia patella and a possible lateral patellar tracking disorder. No fracture, dislocation, or bone destruction seen.'   Right hip - 'No fracture, dislocation, or bone destruction seen. '    Plan -   - Prefers morphine over dilaudid due to severe itching with dilaudid   - Morphine + benadryl PRN   - Cont home oral pain meds   - Cont home gabapentin   - Cont folic acid   - Ortho consult - no acute interventions inpatient but will need follow up for tricompartmental OA of right knee.   - IV fluids     Hypertension  - cont home amlodipine and prazosin   Anxiety and depression  - cont home fluoxetine       History of pulmonary embolism  PE x 2 and supposed to be on anticoagulation indefinitely    Pt used to be on eliquis but changed to lovenox due to too expensive and not covered by insurance. Now pt cannot afford the Lovenox either and has been off this. Discussed with primary hematologist Dr. Stern and ok with starting warfarin.     VTE Risk Mitigation (From admission, onward)           Ordered     warfarin (COUMADIN) tablet 5 mg  Daily         03/27/25 1249     IP VTE HIGH RISK PATIENT  Once         03/25/25 1348     Place sequential compression device  Until discontinued         03/25/25 1348     Reason for No Pharmacological VTE Prophylaxis  Once        Question:  Reasons:  Answer:  Already adequately anticoagulated on oral Anticoagulants    03/25/25 1348                    Discharge Planning   ALYSE: 4/4/2025     Code Status: Full Code   Medical Readiness for Discharge Date:   Discharge Plan A: Home with family                        Sherine Foreman MD  Department of Hospital Medicine   [facility] ([address])

## 2025-03-27 NOTE — PT/OT/SLP EVAL
Occupational Therapy   Evaluation and Discharge Note    Name: Nazanin Malone  MRN: [id number]  Admitting Diagnosis: Acute pain of right lower extremity  Recent Surgery: * No surgery found *      Recommendations:     Discharge Recommendations: No Therapy Indicated  Discharge Equipment Recommendations:  (None anticipated.)  Barriers to discharge:  None     Assessment:   Initial OT eval complete.  Initially groggy requiring cuing for maintaining alertness though much improved with sitting EOB and activity.  Pt. Insisting on removing O2 NC and disconnecting peripheral IV and continuous pulse ox even when advised that the RN can disconnect.  O2 sats decreasing to 87-88% after activity though improving to 94% in less than 30-seconds.  O2 NC returned at end of session with Pt. Also reconnecting IV.  Initially ambulating SBA though again improving to supervision with improved alertness.  Toilet transfer with MOD I and performing toileting, hand hygiene, and doffing socks independently; requiring incidental setup assist for donning socks d/t needed clothing retrieval.  No AD or DME in use PTA though has access to shower chair.  Recommend use of shower chair.  No acute care or post acute OT needs identified.  Please re-consult if situation changes.  To discontinue OT services.      Nazanin Malone is a 44 y.o. female with a medical diagnosis of Acute pain of right lower extremity. At this time, patient is functioning at their prior level of function and does not require further acute OT services.     Plan:     During this hospitalization, patient does not require further acute OT services.  Please re-consult if situation changes.    Plan of Care Reviewed with: patient    Subjective     Chief Complaint: With /co RLE-hip pain.   Patient/Family Comments/goals: No goals stated at this time. Pt. Reports current hip pain has been a chronic issue.      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     Pain/Comfort:  Pain Rating 1: 8/10  Location - Side 1: Right  Location - Orientation 1: generalized  Location 1: hip  Pain Addressed 1: Distraction, Cessation of Activity, Nurse notified  Pain Rating Post-Intervention 1: 8/10 (unchanged)    Patients cultural, spiritual, Anabaptism conflicts given the current situation:  (None stated.)    Objective:     Communicated with: Cyndie OTTO RN prior to session.  Patient found HOB elevated with peripheral IV, telemetry, pulse ox (continuous) upon OT entry to room.    General Precautions: Standard, fall  Orthopedic Precautions: N/A  Braces: N/A  Respiratory Status: Nasal cannula, flow 2 L/min     Occupational Performance:    Bed Mobility:    Supine<>sit MOD I   HOB elevated     Functional Mobility/Transfers:  Sit>stand from EOB SBA  Ambulating bed>bathroom SBA for safety initially though improving to supervision  Pt. Initially groggy though alertness improved with activity  No AD used; no LOB noted   Step transfer to standard toilet MOD I with grab bar used   No LOB or knee buckling noted     Activities of Daily Living:  Toileting MOD I  Unmeasured void at standard toilet  RN made aware  Donning socks setup   Incidental assist for clothing retrieval  [place] socks independently at bed level  Inside lying  Flexing knee and reaching down for foot  Hand hygiene in stance at sink Independently    Cognitive/Visual Perceptual:  Cognitive/Psychosocial Skills:  -       Oriented to: Person, Place, Time, and Situation   -       Follows  Commands/attention:Follows one-step commands  -       Communication: able to make basic needs known and answer questions appropriately; increased time d/t initial grogginess   -       Memory: No Deficits noted  -       Safety awareness/insight to disability: intact   -       Mood/Affect/Coping skills/emotional control: Cooperative    Physical Exam:  Postural examination/scapula alignment: -       Rounded shoulders  -       Forward head  Upper Extremity Range of Motion:  -       Right Upper Extremity: WFL  -       Left Upper Extremity: WFL  Upper Extremity Strength: -       Right Upper Extremity: WFL for ADL  -       Left Upper Extremity: WFL for ADL  Fine Motor Coordination: -       Intact  Left hand, manipulation of objects and Right hand, manipulation of objects    AMPAC 6 Click ADL:  AMPAC Total Score: 24    Treatment & Education:  Educated on role of OT, POC, review of call light, and importance of calling for assist as needed.     Patient left HOB elevated with all lines intact, call button in reach, and nursing notified    GOALS:   Multidisciplinary Problems       Occupational Therapy Goals       Not on file              Multidisciplinary Problems (Resolved)          Problem: Occupational Therapy    Goal Priority Disciplines Outcome Interventions   Occupational Therapy Goal   (Resolved)     OT, PT/OT Met                        DME Justifications:  No DME recommended requiring DME justifications    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/2017    Asthma     Avascular necrosis of femur     Depression     History of pulmonary embolism     Hypertension     Morbid obesity     Opioid dependence 04/16/2017    Pneumonia due to Streptococcus pneumoniae 04/25/2017    Right lower lobe pneumonia 04/29/2017    Sepsis due to Streptococcus pneumoniae 04/25/2017    Sickle cell-beta thalassemia disease with  pain     Trigeminal neuralgia          Past Surgical History:   Procedure Laterality Date     SECTION      ESOPHAGOGASTRODUODENOSCOPY N/A 2024    Procedure: EGD (ESOPHAGOGASTRODUODENOSCOPY);  Surgeon: Allen Marshall MD;  Location: [address]);  Service: Gastroenterology;  Laterality: N/A;    TONSILLECTOMY      TUBAL LIGATION         Time Tracking:     OT Date of Treatment: 25  OT Start Time: 1129  OT Stop Time: 1149  OT Total Time (min): 20 min    Billable Minutes:Evaluation 20    3/27/2025

## 2025-03-27 NOTE — ASSESSMENT & PLAN NOTE
Sickle cell crisis vs worsening of chronic pain     Right ankle - 'There is an osteochondral defect of the medial talar dome. Ankle mortise is preserved. There are spurs on the calcaneus. There is an os trigonum. No acute fracture, dislocation, or bone destruction seen. 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 There is a joint effusion. There is DJD. 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 No fracture, dislocation, or bone destruction seen.'   Right hip - 'No fracture, dislocation, or bone destruction seen. '    Plan -   - Prefers morphine over dilaudid due to severe itching with dilaudid   - Morphine + benadryl PRN   - Cont home oral pain meds   - Cont home gabapentin   - Cont folic acid   - Ortho consult - no acute interventions inpatient but will need follow up for tricompartmental OA of right knee.   - IV fluids

## 2025-03-27 NOTE — CODE/ RAPID DOCUMENTATION
"RAPID RESPONSE NURSE PROACTIVE ROUNDING NOTE     Time of Visit:     Admit Date: 3/25/2025  LOS: 1  Code Status: Full Code   Date of Visit: 2025  : 1980  Age: 44 y.o.  Sex: female  Race: Black or   Bed: B386/B386 A:   MRN: [id number]  Was the patient discharged from an ICU this admission? No   Was the patient discharged from a PACU within last 24 hours?  No  Did the patient receive conscious sedation/general anesthesia in last 24 hours?  No  Was the patient in the ED within the past 24 hours?  Yes  Was the patient started on NIPPV within the past 24 hours?  No  Attending Physician: Sherine Meier MD  Primary Service: Networked reference to record PCT     ASSESSMENT     Notified by Bedside RN via phone call.  Reason for alert: pt somnolent, receives a bunch of pain meds     Diagnosis: Acute pain of right lower extremity    Abnormal Vital Signs: BP (!) 151/93   Pulse 92   Temp 98.4 °F (36.9 °C) (Oral)   Resp 20   Ht 5' 2" (1.575 m)   Wt 94.3 kg (208 lb)   LMP  (LMP Unknown)   SpO2 97%   Breastfeeding No   BMI 38.04 kg/m²      Clinical Issues: Neuro    Patient  has a past medical history of Abnormal Pap smear of cervix, 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, Asthma, Avascular necrosis of femur, Depression, History of pulmonary embolism, Hypertension, Morbid obesity, Opioid dependence, Pneumonia due to Streptococcus pneumoniae, Right lower lobe pneumonia, Sepsis due to Streptococcus pneumoniae, Sickle cell-beta thalassemia disease with pain, and Trigeminal neuralgia.      Pt sitting up in bed. Has LR and O2 going.     Whenever I turned the lights on, the patient immediately responded although her answers were short and she is drowsy.     Vitals taken, see flowsheet.     General assessment completed, pt is drowsy--however, she is able to answer some orientation questions and follow commands.      INTERVENTIONS/ RECOMMENDATIONS     At this time, it would not be " appropriate to administer additional medications--hold off on scheduled meds.     Discussed plan of care with RN, Aracelis CONTE    PHYSICIAN ESCALATION     Yes/No  No    Orders received and case discussed with NA.    Disposition: Remain in room 386.    FOLLOW-UP     Call back the Rapid Response Nurse, Maynor Benson RN at [phone] for additional questions or concerns.

## 2025-03-27 NOTE — PLAN OF CARE
Care plan reviewed with patient. Pain managed by PRN med. Free from falls. No other complaints made. All orders checked and reviewed.

## 2025-03-28 LAB
ALBUMIN SERPL BCP-MCNC: 3.3 G/DL (ref 3.5–5.2)
ALP SERPL-CCNC: 76 UNIT/L (ref 40–150)
ALT SERPL W/O P-5'-P-CCNC: 35 UNIT/L (ref 10–44)
ANION GAP (OHS): 8 MMOL/L (ref 8–16)
AST SERPL-CCNC: 44 UNIT/L (ref 11–45)
BILIRUB SERPL-MCNC: 0.5 MG/DL (ref 0.1–1)
BUN SERPL-MCNC: 18 MG/DL (ref 6–20)
CALCIUM SERPL-MCNC: 8.9 MG/DL (ref 8.7–10.5)
CHLORIDE SERPL-SCNC: 107 MMOL/L (ref 95–110)
CO2 SERPL-SCNC: 25 MMOL/L (ref 23–29)
CREAT SERPL-MCNC: 1.4 MG/DL (ref 0.5–1.4)
ERYTHROCYTE [DISTWIDTH] IN BLOOD BY AUTOMATED COUNT: 22.4 % (ref 11.5–14.5)
GFR SERPLBLD CREATININE-BSD FMLA CKD-EPI: 47 ML/MIN/1.73/M2
GLUCOSE SERPL-MCNC: 99 MG/DL (ref 70–110)
HCT VFR BLD AUTO: 22.5 % (ref 37–48.5)
HGB BLD-MCNC: 7.4 GM/DL (ref 12–16)
INR PPP: 1 (ref 0.8–1.2)
MCH RBC QN AUTO: 23.6 PG (ref 27–50)
MCHC RBC AUTO-ENTMCNC: 32.9 G/DL (ref 32–36)
MCV RBC AUTO: 72 FL (ref 82–98)
PLATELET # BLD AUTO: 165 K/UL (ref 150–450)
PMV BLD AUTO: 9.1 FL (ref 9.2–12.9)
POTASSIUM SERPL-SCNC: 4.3 MMOL/L (ref 3.5–5.1)
PROT SERPL-MCNC: 6.8 GM/DL (ref 6–8.4)
PROTHROMBIN TIME: 10.9 SECONDS (ref 9–12.5)
RBC # BLD AUTO: 3.13 M/UL (ref 4–5.4)
SODIUM SERPL-SCNC: 140 MMOL/L (ref 136–145)
WBC # BLD AUTO: 8.68 K/UL (ref 3.9–12.7)

## 2025-03-28 PROCEDURE — 21400001 HC TELEMETRY ROOM

## 2025-03-28 PROCEDURE — 85610 PROTHROMBIN TIME: CPT | Performed by: STUDENT IN AN ORGANIZED HEALTH CARE EDUCATION/TRAINING PROGRAM

## 2025-03-28 PROCEDURE — 84460 ALANINE AMINO (ALT) (SGPT): CPT | Performed by: STUDENT IN AN ORGANIZED HEALTH CARE EDUCATION/TRAINING PROGRAM

## 2025-03-28 PROCEDURE — 63600175 PHARM REV CODE 636 W HCPCS: Performed by: STUDENT IN AN ORGANIZED HEALTH CARE EDUCATION/TRAINING PROGRAM

## 2025-03-28 PROCEDURE — 97162 PT EVAL MOD COMPLEX 30 MIN: CPT

## 2025-03-28 PROCEDURE — 25000003 PHARM REV CODE 250: Performed by: STUDENT IN AN ORGANIZED HEALTH CARE EDUCATION/TRAINING PROGRAM

## 2025-03-28 PROCEDURE — 85027 COMPLETE CBC AUTOMATED: CPT | Performed by: STUDENT IN AN ORGANIZED HEALTH CARE EDUCATION/TRAINING PROGRAM

## 2025-03-28 RX ORDER — DIPHENHYDRAMINE HYDROCHLORIDE 50 MG/ML
50 INJECTION, SOLUTION INTRAMUSCULAR; INTRAVENOUS ONCE
Status: COMPLETED | OUTPATIENT
Start: 2025-03-28 | End: 2025-03-28

## 2025-03-28 RX ADMIN — WARFARIN SODIUM 5 MG: 5 TABLET ORAL at 06:03

## 2025-03-28 RX ADMIN — MORPHINE SULFATE 10 MG: 10 INJECTION, SOLUTION INTRAMUSCULAR; INTRAVENOUS at 02:03

## 2025-03-28 RX ADMIN — DIPHENHYDRAMINE HYDROCHLORIDE 25 MG: 50 INJECTION INTRAMUSCULAR; INTRAVENOUS at 08:03

## 2025-03-28 RX ADMIN — DIPHENHYDRAMINE HYDROCHLORIDE 25 MG: 50 INJECTION INTRAMUSCULAR; INTRAVENOUS at 06:03

## 2025-03-28 RX ADMIN — FLUOXETINE HYDROCHLORIDE 80 MG: 20 CAPSULE ORAL at 08:03

## 2025-03-28 RX ADMIN — SODIUM CHLORIDE, POTASSIUM CHLORIDE, SODIUM LACTATE AND CALCIUM CHLORIDE: 600; 310; 30; 20 INJECTION, SOLUTION INTRAVENOUS at 03:03

## 2025-03-28 RX ADMIN — DIPHENHYDRAMINE HYDROCHLORIDE 50 MG: 50 INJECTION INTRAMUSCULAR; INTRAVENOUS at 03:03

## 2025-03-28 RX ADMIN — TIZANIDINE 4 MG: 2 TABLET ORAL at 12:03

## 2025-03-28 RX ADMIN — GABAPENTIN 600 MG: 300 CAPSULE ORAL at 08:03

## 2025-03-28 RX ADMIN — DIPHENHYDRAMINE HYDROCHLORIDE 25 MG: 50 INJECTION INTRAMUSCULAR; INTRAVENOUS at 02:03

## 2025-03-28 RX ADMIN — TIZANIDINE 4 MG: 2 TABLET ORAL at 02:03

## 2025-03-28 RX ADMIN — MORPHINE SULFATE 10 MG: 10 INJECTION, SOLUTION INTRAMUSCULAR; INTRAVENOUS at 08:03

## 2025-03-28 RX ADMIN — ACETAMINOPHEN 1000 MG: 500 TABLET ORAL at 09:03

## 2025-03-28 RX ADMIN — AMLODIPINE BESYLATE 10 MG: 5 TABLET ORAL at 08:03

## 2025-03-28 RX ADMIN — PANTOPRAZOLE SODIUM 40 MG: 40 TABLET, DELAYED RELEASE ORAL at 08:03

## 2025-03-28 RX ADMIN — MORPHINE SULFATE 10 MG: 10 INJECTION, SOLUTION INTRAMUSCULAR; INTRAVENOUS at 12:03

## 2025-03-28 RX ADMIN — MORPHINE SULFATE 10 MG: 10 INJECTION, SOLUTION INTRAMUSCULAR; INTRAVENOUS at 05:03

## 2025-03-28 RX ADMIN — DIPHENHYDRAMINE HYDROCHLORIDE 25 MG: 50 INJECTION INTRAMUSCULAR; INTRAVENOUS at 12:03

## 2025-03-28 RX ADMIN — MORPHINE SULFATE 10 MG: 10 INJECTION, SOLUTION INTRAMUSCULAR; INTRAVENOUS at 03:03

## 2025-03-28 RX ADMIN — PRAZOSIN HYDROCHLORIDE 1 MG: 1 CAPSULE ORAL at 08:03

## 2025-03-28 RX ADMIN — ACETAMINOPHEN 1000 MG: 500 TABLET ORAL at 01:03

## 2025-03-28 RX ADMIN — DIPHENHYDRAMINE HYDROCHLORIDE 25 MG: 50 INJECTION INTRAMUSCULAR; INTRAVENOUS at 05:03

## 2025-03-28 RX ADMIN — MORPHINE SULFATE 10 MG: 10 INJECTION, SOLUTION INTRAMUSCULAR; INTRAVENOUS at 06:03

## 2025-03-28 RX ADMIN — MORPHINE SULFATE 30 MG: 15 TABLET, FILM COATED, EXTENDED RELEASE ORAL at 08:03

## 2025-03-28 RX ADMIN — MORPHINE SULFATE 30 MG: 15 TABLET, FILM COATED, EXTENDED RELEASE ORAL at 03:03

## 2025-03-28 RX ADMIN — MUPIROCIN: 20 OINTMENT TOPICAL at 09:03

## 2025-03-28 RX ADMIN — FOLIC ACID 1 MG: 1 TABLET ORAL at 08:03

## 2025-03-28 RX ADMIN — MORPHINE SULFATE 10 MG: 10 INJECTION, SOLUTION INTRAMUSCULAR; INTRAVENOUS at 09:03

## 2025-03-28 RX ADMIN — GABAPENTIN 600 MG: 300 CAPSULE ORAL at 03:03

## 2025-03-28 RX ADMIN — DIPHENHYDRAMINE HYDROCHLORIDE 25 MG: 50 INJECTION INTRAMUSCULAR; INTRAVENOUS at 09:03

## 2025-03-28 RX ADMIN — TIZANIDINE 4 MG: 2 TABLET ORAL at 09:03

## 2025-03-28 NOTE — PT/OT/SLP EVAL
Physical Therapy Evaluation and Discharge Note    Patient Name:  Nazanin Malone   MRN:  [id number]    Recommendations:     Discharge Recommendations: Low Intensity Therapy (OP PT)  Discharge Equipment Recommendations: cane, straight   Barriers to discharge: None    Assessment:     Nazanin Malone is a 44 y.o. female admitted with a medical diagnosis of Acute pain of right lower extremity. ascular necrosis of femur, opioid dependence, HTN, depression and h/o pulmonary embolism on anticoagulation.    Patient evaluated and no acute care PT goals identified. Patient with continued throbbing RLE pain. Gait training performed with cane with pt reporting some improvement in pain and would like a cane for DC home (previously used crutches but fell with them).     During this hospitalization, patient does not require further acute PT services.  Please re-consult if situation changes.  Recommendation for d/c to home with OP PT.    Recent Surgery: * No surgery found *      Plan:     During this hospitalization, patient does not require further acute PT services.  Please re-consult if situation changes.      Subjective     Chief Complaint: Pain in RLE, pain meds due in about 30 min  Patient/Family Comments/goals: Goal to have less pain, interested in using cane; Patient agreeable to evaluation.  Pain/Comfort:  Pain Rating 1: 9/10  Location - Side 1: Right  Location 1: leg (knee radiating down to foot, throbbing pain that is mostly constant)  Pain Addressed 1: Reposition, Distraction, Cessation of Activity  Pain Rating Post-Intervention 1:  (reports some relief with use of cane)    Patients cultural, spiritual, Moravian conflicts given the current situation: no    Living Environment:  Lives with family in [facility] with 2 steps down into [facility].  Prior to admission, patients level of function was indep.  Equipment used at home:  (has shower chair and previously used crutches (but had difficulty)).  Upon discharge, patient will  have assistance from her family.    Objective:     Communicated with RN prior to session.  Patient found right sidelying with peripheral IV upon PT entry to room.    General Precautions: Standard,      Orthopedic Precautions:N/A   Braces: N/A  Respiratory Status: Room air    Exams:  Cognitive Exam:  Patient is oriented to Person, Place, Time, and Situation  Gross Motor Coordination:  WFL  RLE ROM: WFL  RLE Strength: WFL  LLE ROM: WFL  LLE Strength: WFL    Functional Mobility:  Bed Mobility:     Supine to Sit: independence  Sit to Supine: independence  Transfers:     Sit to Stand:  independence with no AD and straight cane  Gait: x50 ft with cane and supervision-mod(I). Minimal antalgic gait noted, slightly decreased R stance time. Demo of sequencing with RLE pain and cane, pt mostly able to perform with appropriate sequencing but regardless of sequencing no overt LOB or gait instability.    AM-PAC 6 CLICK MOBILITY  Total Score:23       Treatment and Education:  Gait training with cane  PT educated patient re:   PT plan of care/role of PT  Safety with OOB mobility  Use of DME  Discharge disposition    Pt verbalized understanding       AM-PAC 6 CLICK MOBILITY  Total Score:23     Patient left HOB elevated with all lines intact and call button in reach.    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/2017    Asthma     Avascular necrosis of femur     Depression     History of pulmonary embolism     Hypertension     Morbid obesity     Opioid  dependence 2017    Pneumonia due to Streptococcus pneumoniae 2017    Right lower lobe pneumonia 2017    Sepsis due to Streptococcus pneumoniae 2017    Sickle cell-beta thalassemia disease with pain     Trigeminal neuralgia        Past Surgical History:   Procedure Laterality Date     SECTION      ESOPHAGOGASTRODUODENOSCOPY N/A 2024    Procedure: EGD (ESOPHAGOGASTRODUODENOSCOPY);  Surgeon: Allen Marshall MD;  Location: [facility] ([address] [place] [address]);  Service: Gastroenterology;  Laterality: N/A;    TONSILLECTOMY      TUBAL LIGATION         Time Tracking:     PT Received On: 25  PT Start Time: 1441     PT Stop Time: 1459  PT Total Time (min): 18 min     Billable Minutes: Evaluation 15 Gait 3 min  unbilled      2025

## 2025-03-28 NOTE — PLAN OF CARE
Problem: Infection  Goal: Absence of Infection Signs and Symptoms  Outcome: Progressing     Problem: Acute Kidney Injury/Impairment  Goal: Effective Renal Function  Outcome: Progressing     Problem: Adult Inpatient Plan of Care  Goal: Optimal Comfort and Wellbeing  Outcome: Progressing

## 2025-03-28 NOTE — SUBJECTIVE & OBJECTIVE
Interval History: Still with pain in right leg     Review of Systems   Constitutional:  Negative for fever.   Respiratory:  Negative for shortness of breath.    Cardiovascular:  Negative for chest pain.   Gastrointestinal:  Negative for abdominal pain.   Musculoskeletal:  Positive for arthralgias.   Psychiatric/Behavioral:  Negative for agitation and confusion.      Objective:     Vital Signs (Most Recent):  Temp: 97.9 °F (36.6 °C) (03/28/25 0730)  Pulse: 76 (03/28/25 1050)  Resp: 19 (03/28/25 0840)  BP: 139/78 (03/28/25 0730)  SpO2: 96 % (03/28/25 0730) Vital Signs (24h Range):  Temp:  [97.8 °F (36.6 °C)-99 °F (37.2 °C)] 97.9 °F (36.6 °C)  Pulse:  [74-97] 76  Resp:  [16-19] 19  SpO2:  [91 %-98 %] 96 %  BP: ()/(51-78) 139/78     Weight: 94.3 kg (208 lb)  Body mass index is 38.04 kg/m².    Intake/Output Summary (Last 24 hours) at 3/28/2025 1137  Last data filed at 3/28/2025 0835  Gross per 24 hour   Intake 1220 ml   Output 1800 ml   Net -580 ml         Physical Exam  Constitutional:       General: She is not in acute distress.  Pulmonary:      Effort: No respiratory distress.      Breath sounds: No wheezing.   Abdominal:      General: There is no distension.      Tenderness: There is no abdominal tenderness.   Musculoskeletal:      Comments: Right ankle, knee and hip pain. No overlying skin changes    Neurological:      General: No focal deficit present.      Mental Status: She is oriented to person, place, and time.               Significant Labs: All pertinent labs within the past 24 hours have been reviewed.    Significant Imaging: I have reviewed all pertinent imaging results/findings within the past 24 hours.

## 2025-03-28 NOTE — ASSESSMENT & PLAN NOTE
Sickle cell crisis vs worsening of chronic pain     Right ankle - 'There is an osteochondral defect of the medial talar dome. Ankle mortise is preserved. There are spurs on the calcaneus. There is an os trigonum. No acute fracture, dislocation, or bone destruction seen. 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 There is a joint effusion. There is DJD. 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 No fracture, dislocation, or bone destruction seen.'   Right hip - 'No fracture, dislocation, or bone destruction seen. '    Plan -   - Prefers morphine over dilaudid due to severe itching with dilaudid   - Morphine + benadryl PRN - wean as able   - Cont home oral pain meds   - Cont home gabapentin   - Cont folic acid   - Ortho consult - no acute interventions inpatient but will need follow up for tricompartmental OA of right knee.   - IV fluids

## 2025-03-28 NOTE — PROGRESS NOTES
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 medications. Denies any trauma. Pt denies any other sx such as fever, cough, abd pain or chest pain.     In ED /109. Other vitals stable. Hb 9.7. Retic 1.1. UA unremarkable. Pt given multiple doses of dilaudid, benadryl 1L fluids and zofran. Admitted to hospital medicine for further mngx.     Overview/Hospital Course:  Pt still with severe pain in right lower extremity joints. Right ankle - 'There is an osteochondral defect of the medial talar dome. Ankle mortise is preserved. There are spurs on the calcaneus. There is an os trigonum. 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 There is a joint effusion. There is DJD. 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 No fracture, dislocation, or bone destruction seen.' Right hip - 'No fracture, dislocation, or bone destruction seen.' Ortho eval - no acute interventions inpatient but will need follow up for tricompartmental OA of right knee. PT/OT. Cont pain mngx. Wean as able.     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 Pt used to be on eliquis but changed to lovenox due to too expensive and not covered by insurance. Now pt  cannot afford the Lovenox either and has been off this. Discussed with primary hematologist Dr. Stern and ok with starting warfarin. Monitor INR and set up with coumadin clinic on discharge.     Interval History: Still with pain in right leg     Review of Systems   Constitutional:  Negative for fever.   Respiratory:  Negative for shortness of breath.    Cardiovascular:  Negative for chest pain.   Gastrointestinal:  Negative for abdominal pain.   Musculoskeletal:  Positive for arthralgias.   Psychiatric/Behavioral:  Negative for agitation and confusion.      Objective:     Vital Signs (Most Recent):  Temp: 97.9 °F (36.6 °C) (03/28/25 0730)  Pulse: 76 (03/28/25 1050)  Resp: 19 (03/28/25 0840)  BP: 139/78 (03/28/25 0730)  SpO2: 96 % (03/28/25 0730) Vital Signs (24h Range):  Temp:  [97.8 °F (36.6 °C)-99 °F (37.2 °C)] 97.9 °F (36.6 °C)  Pulse:  [74-97] 76  Resp:  [16-19] 19  SpO2:  [91 %-98 %] 96 %  BP: ()/(51-78) 139/78     Weight: 94.3 kg (208 lb)  Body mass index is 38.04 kg/m².    Intake/Output Summary (Last 24 hours) at 3/28/2025 1137  Last data filed at 3/28/2025 0835  Gross per 24 hour   Intake 1220 ml   Output 1800 ml   Net -580 ml         Physical Exam  Constitutional:       General: She is not in acute distress.  Pulmonary:      Effort: No respiratory distress.      Breath sounds: No wheezing.   Abdominal:      General: There is no distension.      Tenderness: There is no abdominal tenderness.   Musculoskeletal:      Comments: Right ankle, knee and hip pain. No overlying skin changes    Neurological:      General: No focal deficit present.      Mental Status: She is oriented to person, place, and time.               Significant Labs: All pertinent labs within the past 24 hours have been reviewed.    Significant Imaging: I have reviewed all pertinent imaging results/findings within the past 24 hours.      Assessment & Plan  Acute pain of right lower extremity  Sickle cell crisis vs worsening of chronic  pain     Right ankle - 'There is an osteochondral defect of the medial talar dome. Ankle mortise is preserved. There are spurs on the calcaneus. There is an os trigonum. No acute fracture, dislocation, or bone destruction seen. 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 There is a joint effusion. There is DJD. 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 No fracture, dislocation, or bone destruction seen.'   Right hip - 'No fracture, dislocation, or bone destruction seen. '    Plan -   - Prefers morphine over dilaudid due to severe itching with dilaudid   - Morphine + benadryl PRN - wean as able   - Cont home oral pain meds   - Cont home gabapentin   - Cont folic acid   - Ortho consult - no acute interventions inpatient but will need follow up for tricompartmental OA of right knee.   - IV fluids     Hypertension  - cont home amlodipine and prazosin   Anxiety and depression  - cont home fluoxetine       History of pulmonary embolism  PE x 2 and supposed to be on anticoagulation indefinitely    Pt used to be on eliquis but changed to lovenox due to too expensive and not covered by insurance. Now pt cannot afford the Lovenox either and has been off this. Discussed with primary hematologist Dr. Stern and ok with starting warfarin. Monitor INR and set up with coumadin clinic on discharge.     VTE Risk Mitigation (From admission, onward)           Ordered     warfarin (COUMADIN) tablet 5 mg  Daily         03/27/25 1249     IP VTE HIGH RISK PATIENT  Once         03/25/25 1348     Place sequential compression device  Until discontinued         03/25/25 1348     Reason for No Pharmacological VTE Prophylaxis  Once        Question:  Reasons:  Answer:  Already adequately anticoagulated on oral Anticoagulants    03/25/25 1348                    Discharge Planning   ALYSE: 4/4/2025     Code Status: Full Code   Medical Readiness for Discharge Date:    Discharge Plan A: Home with family                        Sherine Foreman MD  Department of Hospital Medicine   [facility] - [facility] Surg ([address])

## 2025-03-28 NOTE — ASSESSMENT & PLAN NOTE
PE x 2 and supposed to be on anticoagulation indefinitely    Pt used to be on eliquis but changed to lovenox due to too expensive and not covered by insurance. Now pt cannot afford the Lovenox either and has been off this. Discussed with primary hematologist Dr. Stern and ok with starting warfarin. Monitor INR and set up with coumadin clinic on discharge.

## 2025-03-29 LAB
ALBUMIN SERPL BCP-MCNC: 3.4 G/DL (ref 3.5–5.2)
ALP SERPL-CCNC: 76 UNIT/L (ref 40–150)
ALT SERPL W/O P-5'-P-CCNC: 29 UNIT/L (ref 10–44)
ANION GAP (OHS): 6 MMOL/L (ref 8–16)
AST SERPL-CCNC: 27 UNIT/L (ref 11–45)
BILIRUB SERPL-MCNC: 0.5 MG/DL (ref 0.1–1)
BLOOD GROUP ANTIBODIES SERPL: NORMAL
BUN SERPL-MCNC: 13 MG/DL (ref 6–20)
CALCIUM SERPL-MCNC: 9.1 MG/DL (ref 8.7–10.5)
CHLORIDE SERPL-SCNC: 105 MMOL/L (ref 95–110)
CO2 SERPL-SCNC: 30 MMOL/L (ref 23–29)
CREAT SERPL-MCNC: 0.9 MG/DL (ref 0.5–1.4)
ERYTHROCYTE [DISTWIDTH] IN BLOOD BY AUTOMATED COUNT: 21.8 % (ref 11.5–14.5)
GFR SERPLBLD CREATININE-BSD FMLA CKD-EPI: >60 ML/MIN/1.73/M2
GLUCOSE SERPL-MCNC: 71 MG/DL (ref 70–110)
HCT VFR BLD AUTO: 24.1 % (ref 37–48.5)
HGB BLD-MCNC: 7.9 GM/DL (ref 12–16)
INDIRECT COOMBS: ABNORMAL
INR PPP: 1 (ref 0.8–1.2)
MCH RBC QN AUTO: 23.2 PG (ref 27–50)
MCHC RBC AUTO-ENTMCNC: 32.8 G/DL (ref 32–36)
MCV RBC AUTO: 71 FL (ref 82–98)
PLATELET # BLD AUTO: 155 K/UL (ref 150–450)
PMV BLD AUTO: 9.1 FL (ref 9.2–12.9)
POTASSIUM SERPL-SCNC: 3.9 MMOL/L (ref 3.5–5.1)
PROT SERPL-MCNC: 7.2 GM/DL (ref 6–8.4)
PROTHROMBIN TIME: 11.1 SECONDS (ref 9–12.5)
RBC # BLD AUTO: 3.41 M/UL (ref 4–5.4)
RH BLD: ABNORMAL
SODIUM SERPL-SCNC: 141 MMOL/L (ref 136–145)
SPECIMEN OUTDATE: ABNORMAL
WBC # BLD AUTO: 7.44 K/UL (ref 3.9–12.7)

## 2025-03-29 PROCEDURE — 85610 PROTHROMBIN TIME: CPT | Performed by: STUDENT IN AN ORGANIZED HEALTH CARE EDUCATION/TRAINING PROGRAM

## 2025-03-29 PROCEDURE — 85027 COMPLETE CBC AUTOMATED: CPT | Performed by: STUDENT IN AN ORGANIZED HEALTH CARE EDUCATION/TRAINING PROGRAM

## 2025-03-29 PROCEDURE — 86870 RBC ANTIBODY IDENTIFICATION: CPT | Performed by: STUDENT IN AN ORGANIZED HEALTH CARE EDUCATION/TRAINING PROGRAM

## 2025-03-29 PROCEDURE — 63600175 PHARM REV CODE 636 W HCPCS: Performed by: STUDENT IN AN ORGANIZED HEALTH CARE EDUCATION/TRAINING PROGRAM

## 2025-03-29 PROCEDURE — 86850 RBC ANTIBODY SCREEN: CPT | Performed by: STUDENT IN AN ORGANIZED HEALTH CARE EDUCATION/TRAINING PROGRAM

## 2025-03-29 PROCEDURE — 21400001 HC TELEMETRY ROOM

## 2025-03-29 PROCEDURE — 27000221 HC OXYGEN, UP TO 24 HOURS

## 2025-03-29 PROCEDURE — 25000003 PHARM REV CODE 250: Performed by: STUDENT IN AN ORGANIZED HEALTH CARE EDUCATION/TRAINING PROGRAM

## 2025-03-29 PROCEDURE — 94761 N-INVAS EAR/PLS OXIMETRY MLT: CPT

## 2025-03-29 PROCEDURE — 80053 COMPREHEN METABOLIC PANEL: CPT | Performed by: STUDENT IN AN ORGANIZED HEALTH CARE EDUCATION/TRAINING PROGRAM

## 2025-03-29 PROCEDURE — 99900035 HC TECH TIME PER 15 MIN (STAT)

## 2025-03-29 RX ORDER — DIPHENHYDRAMINE HYDROCHLORIDE 50 MG/ML
25 INJECTION, SOLUTION INTRAMUSCULAR; INTRAVENOUS ONCE
Status: DISCONTINUED | OUTPATIENT
Start: 2025-03-29 | End: 2025-03-29

## 2025-03-29 RX ORDER — DIPHENHYDRAMINE HYDROCHLORIDE 50 MG/ML
25 INJECTION, SOLUTION INTRAMUSCULAR; INTRAVENOUS ONCE
Status: COMPLETED | OUTPATIENT
Start: 2025-03-29 | End: 2025-03-29

## 2025-03-29 RX ORDER — POLYETHYLENE GLYCOL 3350 17 G/17G
17 POWDER, FOR SOLUTION ORAL DAILY
Status: DISCONTINUED | OUTPATIENT
Start: 2025-03-29 | End: 2025-04-03

## 2025-03-29 RX ORDER — AMOXICILLIN 250 MG
1 CAPSULE ORAL DAILY
Status: DISCONTINUED | OUTPATIENT
Start: 2025-03-29 | End: 2025-04-02

## 2025-03-29 RX ORDER — WARFARIN 7.5 MG/1
7.5 TABLET ORAL DAILY
Status: DISCONTINUED | OUTPATIENT
Start: 2025-03-29 | End: 2025-03-31

## 2025-03-29 RX ADMIN — MORPHINE SULFATE 10 MG: 10 INJECTION, SOLUTION INTRAMUSCULAR; INTRAVENOUS at 03:03

## 2025-03-29 RX ADMIN — WARFARIN SODIUM 7.5 MG: 7.5 TABLET ORAL at 04:03

## 2025-03-29 RX ADMIN — FOLIC ACID 1 MG: 1 TABLET ORAL at 08:03

## 2025-03-29 RX ADMIN — GABAPENTIN 600 MG: 300 CAPSULE ORAL at 02:03

## 2025-03-29 RX ADMIN — MORPHINE SULFATE 30 MG: 15 TABLET, FILM COATED, EXTENDED RELEASE ORAL at 08:03

## 2025-03-29 RX ADMIN — DIPHENHYDRAMINE HYDROCHLORIDE 25 MG: 50 INJECTION INTRAMUSCULAR; INTRAVENOUS at 09:03

## 2025-03-29 RX ADMIN — ACETAMINOPHEN 1000 MG: 500 TABLET ORAL at 06:03

## 2025-03-29 RX ADMIN — DIPHENHYDRAMINE HYDROCHLORIDE 25 MG: 50 INJECTION INTRAMUSCULAR; INTRAVENOUS at 03:03

## 2025-03-29 RX ADMIN — PANTOPRAZOLE SODIUM 40 MG: 40 TABLET, DELAYED RELEASE ORAL at 08:03

## 2025-03-29 RX ADMIN — AMLODIPINE BESYLATE 10 MG: 5 TABLET ORAL at 08:03

## 2025-03-29 RX ADMIN — DIPHENHYDRAMINE HYDROCHLORIDE 25 MG: 50 INJECTION INTRAMUSCULAR; INTRAVENOUS at 06:03

## 2025-03-29 RX ADMIN — ACETAMINOPHEN 1000 MG: 500 TABLET ORAL at 09:03

## 2025-03-29 RX ADMIN — DIPHENHYDRAMINE HYDROCHLORIDE 25 MG: 50 INJECTION INTRAMUSCULAR; INTRAVENOUS at 12:03

## 2025-03-29 RX ADMIN — GABAPENTIN 600 MG: 300 CAPSULE ORAL at 08:03

## 2025-03-29 RX ADMIN — TIZANIDINE 4 MG: 2 TABLET ORAL at 08:03

## 2025-03-29 RX ADMIN — PRAZOSIN HYDROCHLORIDE 1 MG: 1 CAPSULE ORAL at 08:03

## 2025-03-29 RX ADMIN — FLUOXETINE HYDROCHLORIDE 80 MG: 20 CAPSULE ORAL at 08:03

## 2025-03-29 RX ADMIN — SODIUM CHLORIDE, POTASSIUM CHLORIDE, SODIUM LACTATE AND CALCIUM CHLORIDE: 600; 310; 30; 20 INJECTION, SOLUTION INTRAVENOUS at 02:03

## 2025-03-29 RX ADMIN — MUPIROCIN: 20 OINTMENT TOPICAL at 08:03

## 2025-03-29 RX ADMIN — MORPHINE SULFATE 10 MG: 10 INJECTION, SOLUTION INTRAMUSCULAR; INTRAVENOUS at 06:03

## 2025-03-29 RX ADMIN — TIZANIDINE 4 MG: 2 TABLET ORAL at 09:03

## 2025-03-29 RX ADMIN — MORPHINE SULFATE 10 MG: 10 INJECTION, SOLUTION INTRAMUSCULAR; INTRAVENOUS at 12:03

## 2025-03-29 RX ADMIN — MORPHINE SULFATE 10 MG: 10 INJECTION, SOLUTION INTRAMUSCULAR; INTRAVENOUS at 09:03

## 2025-03-29 RX ADMIN — SENNOSIDES AND DOCUSATE SODIUM 1 TABLET: 50; 8.6 TABLET ORAL at 08:03

## 2025-03-29 RX ADMIN — MORPHINE SULFATE 30 MG: 15 TABLET, FILM COATED, EXTENDED RELEASE ORAL at 02:03

## 2025-03-29 RX ADMIN — ACETAMINOPHEN 1000 MG: 500 TABLET ORAL at 02:03

## 2025-03-29 NOTE — ASSESSMENT & PLAN NOTE
Sickle cell crisis vs worsening of chronic pain     Right ankle - 'There is an osteochondral defect of the medial talar dome. Ankle mortise is preserved. There are spurs on the calcaneus. There is an os trigonum. No acute fracture, dislocation, or bone destruction seen. 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 There is a joint effusion. There is DJD. 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 No fracture, dislocation, or bone destruction seen.'   Right hip - 'No fracture, dislocation, or bone destruction seen. '    Plan -   - Prefers morphine over dilaudid due to severe itching with dilaudid   - Morphine + benadryl PRN - wean as able   - Cont home oral pain meds   - Cont home gabapentin   - Cont folic acid   - Ortho consult - no acute interventions inpatient but will need follow up for tricompartmental OA of right knee.   - IV fluids   - PT - cane and outpt PT

## 2025-03-29 NOTE — SUBJECTIVE & OBJECTIVE
Interval History: Still with pain in right leg     Review of Systems   Constitutional:  Negative for fever.   Respiratory:  Negative for shortness of breath.    Cardiovascular:  Negative for chest pain.   Gastrointestinal:  Negative for abdominal pain.   Musculoskeletal:  Positive for arthralgias.   Psychiatric/Behavioral:  Negative for agitation and confusion.      Objective:     Vital Signs (Most Recent):  Temp: 98.3 °F (36.8 °C) (03/29/25 0753)  Pulse: 72 (03/29/25 0753)  Resp: 14 (03/29/25 0753)  BP: (!) 140/79 (03/29/25 0753)  SpO2: 95 % (03/29/25 0753) Vital Signs (24h Range):  Temp:  [98.3 °F (36.8 °C)-98.7 °F (37.1 °C)] 98.3 °F (36.8 °C)  Pulse:  [72-83] 72  Resp:  [14-20] 14  SpO2:  [94 %-99 %] 95 %  BP: (118-174)/(66-97) 140/79     Weight: 94.3 kg (208 lb)  Body mass index is 38.04 kg/m².    Intake/Output Summary (Last 24 hours) at 3/29/2025 0832  Last data filed at 3/29/2025 0524  Gross per 24 hour   Intake 990 ml   Output 1600 ml   Net -610 ml         Physical Exam  Constitutional:       General: She is not in acute distress.  Pulmonary:      Effort: No respiratory distress.      Breath sounds: No wheezing.   Abdominal:      General: There is no distension.      Tenderness: There is no abdominal tenderness.   Musculoskeletal:      Comments: Right ankle, knee and hip pain. No overlying skin changes    Neurological:      General: No focal deficit present.      Mental Status: She is oriented to person, place, and time.               Significant Labs: All pertinent labs within the past 24 hours have been reviewed.    Significant Imaging: I have reviewed all pertinent imaging results/findings within the past 24 hours.

## 2025-03-29 NOTE — PROGRESS NOTES
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 medications. Denies any trauma. Pt denies any other sx such as fever, cough, abd pain or chest pain.     In ED /109. Other vitals stable. Hb 9.7. Retic 1.1. UA unremarkable. Pt given multiple doses of dilaudid, benadryl 1L fluids and zofran. Admitted to hospital medicine for further mngx.     Overview/Hospital Course:  Pt still with severe pain in right lower extremity joints. Right ankle - 'There is an osteochondral defect of the medial talar dome. Ankle mortise is preserved. There are spurs on the calcaneus. There is an os trigonum. 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 There is a joint effusion. There is DJD. 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 No fracture, dislocation, or bone destruction seen.' Right hip - 'No fracture, dislocation, or bone destruction seen.' Ortho eval - no acute interventions inpatient but will need follow up for tricompartmental OA of right knee. PT/OT - cane, outpt PT. Cont pain mngx. Wean as able.     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 Pt used to be on eliquis but changed to lovenox due to too expensive and not covered by  insurance. Now pt cannot afford the Lovenox either and has been off this. Discussed with primary hematologist Dr. Stern and ok with starting warfarin. Monitor INR and set up with coumadin clinic on discharge.     Interval History: Still with pain in right leg     Review of Systems   Constitutional:  Negative for fever.   Respiratory:  Negative for shortness of breath.    Cardiovascular:  Negative for chest pain.   Gastrointestinal:  Negative for abdominal pain.   Musculoskeletal:  Positive for arthralgias.   Psychiatric/Behavioral:  Negative for agitation and confusion.      Objective:     Vital Signs (Most Recent):  Temp: 98.3 °F (36.8 °C) (03/29/25 0753)  Pulse: 72 (03/29/25 0753)  Resp: 14 (03/29/25 0753)  BP: (!) 140/79 (03/29/25 0753)  SpO2: 95 % (03/29/25 0753) Vital Signs (24h Range):  Temp:  [98.3 °F (36.8 °C)-98.7 °F (37.1 °C)] 98.3 °F (36.8 °C)  Pulse:  [72-83] 72  Resp:  [14-20] 14  SpO2:  [94 %-99 %] 95 %  BP: (118-174)/(66-97) 140/79     Weight: 94.3 kg (208 lb)  Body mass index is 38.04 kg/m².    Intake/Output Summary (Last 24 hours) at 3/29/2025 0832  Last data filed at 3/29/2025 0524  Gross per 24 hour   Intake 990 ml   Output 1600 ml   Net -610 ml         Physical Exam  Constitutional:       General: She is not in acute distress.  Pulmonary:      Effort: No respiratory distress.      Breath sounds: No wheezing.   Abdominal:      General: There is no distension.      Tenderness: There is no abdominal tenderness.   Musculoskeletal:      Comments: Right ankle, knee and hip pain. No overlying skin changes    Neurological:      General: No focal deficit present.      Mental Status: She is oriented to person, place, and time.               Significant Labs: All pertinent labs within the past 24 hours have been reviewed.    Significant Imaging: I have reviewed all pertinent imaging results/findings within the past 24 hours.      Assessment & Plan  Acute pain of right lower extremity  Sickle cell crisis vs  worsening of chronic pain     Right ankle - 'There is an osteochondral defect of the medial talar dome. Ankle mortise is preserved. There are spurs on the calcaneus. There is an os trigonum. No acute fracture, dislocation, or bone destruction seen. 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 There is a joint effusion. There is DJD. 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 No fracture, dislocation, or bone destruction seen.'   Right hip - 'No fracture, dislocation, or bone destruction seen. '    Plan -   - Prefers morphine over dilaudid due to severe itching with dilaudid   - Morphine + benadryl PRN - wean as able   - Cont home oral pain meds   - Cont home gabapentin   - Cont folic acid   - Ortho consult - no acute interventions inpatient but will need follow up for tricompartmental OA of right knee.   - IV fluids   - PT - cane and outpt PT     Hypertension  - cont home amlodipine and prazosin   Anxiety and depression  - cont home fluoxetine       History of pulmonary embolism  PE x 2 and supposed to be on anticoagulation indefinitely    Pt used to be on eliquis but changed to lovenox due to too expensive and not covered by insurance. Now pt cannot afford the Lovenox either and has been off this. Discussed with primary hematologist Dr. Stern and ok with starting warfarin. Monitor INR and set up with coumadin clinic on discharge.     VTE Risk Mitigation (From admission, onward)           Ordered     warfarin (COUMADIN) tablet 7.5 mg  Daily         03/29/25 0718     IP VTE HIGH RISK PATIENT  Once         03/25/25 1348     Place sequential compression device  Until discontinued         03/25/25 1348     Reason for No Pharmacological VTE Prophylaxis  Once        Question:  Reasons:  Answer:  Already adequately anticoagulated on oral Anticoagulants    03/25/25 1348                    Discharge Planning   ALYSE: 4/4/2025     Code Status:  Full Code   Medical Readiness for Discharge Date:   Discharge Plan A: Home with family                        Sherine Foreman MD  Department of Hospital Medicine   [facility] Surg ([address])

## 2025-03-29 NOTE — PLAN OF CARE
Problem: Gas Exchange Impaired  Goal: Optimal Gas Exchange  Outcome: Progressing  Intervention: Optimize Oxygenation and Ventilation  Flowsheets (Taken 3/29/2025 2664)  Airway/Ventilation Management: airway patency maintained  Head of Bed (HOB) Positioning: HOB elevated   Patient in no apparent distress. Sat's 98  % on 1 lpm, placed on room air . Will continue to monitor.

## 2025-03-30 LAB
ALBUMIN SERPL BCP-MCNC: 3.5 G/DL (ref 3.5–5.2)
ALP SERPL-CCNC: 77 UNIT/L (ref 40–150)
ALT SERPL W/O P-5'-P-CCNC: 24 UNIT/L (ref 10–44)
ANION GAP (OHS): 8 MMOL/L (ref 8–16)
AST SERPL-CCNC: 22 UNIT/L (ref 11–45)
BILIRUB SERPL-MCNC: 0.5 MG/DL (ref 0.1–1)
BUN SERPL-MCNC: 14 MG/DL (ref 6–20)
CALCIUM SERPL-MCNC: 9.2 MG/DL (ref 8.7–10.5)
CHLORIDE SERPL-SCNC: 103 MMOL/L (ref 95–110)
CO2 SERPL-SCNC: 29 MMOL/L (ref 23–29)
CREAT SERPL-MCNC: 1.1 MG/DL (ref 0.5–1.4)
ERYTHROCYTE [DISTWIDTH] IN BLOOD BY AUTOMATED COUNT: 22.3 % (ref 11.5–14.5)
GFR SERPLBLD CREATININE-BSD FMLA CKD-EPI: >60 ML/MIN/1.73/M2
GLUCOSE SERPL-MCNC: 106 MG/DL (ref 70–110)
HCT VFR BLD AUTO: 25.6 % (ref 37–48.5)
HGB BLD-MCNC: 8.4 GM/DL (ref 12–16)
INR PPP: 1.2 (ref 0.8–1.2)
MCH RBC QN AUTO: 23.3 PG (ref 27–50)
MCHC RBC AUTO-ENTMCNC: 32.8 G/DL (ref 32–36)
MCV RBC AUTO: 71 FL (ref 82–98)
PLATELET # BLD AUTO: 159 K/UL (ref 150–450)
PMV BLD AUTO: 9.3 FL (ref 9.2–12.9)
POTASSIUM SERPL-SCNC: 4 MMOL/L (ref 3.5–5.1)
PROT SERPL-MCNC: 7.3 GM/DL (ref 6–8.4)
PROTHROMBIN TIME: 13.7 SECONDS (ref 9–12.5)
RBC # BLD AUTO: 3.61 M/UL (ref 4–5.4)
SODIUM SERPL-SCNC: 140 MMOL/L (ref 136–145)
WBC # BLD AUTO: 8.73 K/UL (ref 3.9–12.7)

## 2025-03-30 PROCEDURE — 25000003 PHARM REV CODE 250: Performed by: PHYSICIAN ASSISTANT

## 2025-03-30 PROCEDURE — 25000003 PHARM REV CODE 250: Performed by: STUDENT IN AN ORGANIZED HEALTH CARE EDUCATION/TRAINING PROGRAM

## 2025-03-30 PROCEDURE — 21400001 HC TELEMETRY ROOM

## 2025-03-30 PROCEDURE — 84450 TRANSFERASE (AST) (SGOT): CPT | Performed by: STUDENT IN AN ORGANIZED HEALTH CARE EDUCATION/TRAINING PROGRAM

## 2025-03-30 PROCEDURE — 36415 COLL VENOUS BLD VENIPUNCTURE: CPT | Performed by: STUDENT IN AN ORGANIZED HEALTH CARE EDUCATION/TRAINING PROGRAM

## 2025-03-30 PROCEDURE — 85027 COMPLETE CBC AUTOMATED: CPT | Performed by: STUDENT IN AN ORGANIZED HEALTH CARE EDUCATION/TRAINING PROGRAM

## 2025-03-30 PROCEDURE — 85610 PROTHROMBIN TIME: CPT | Performed by: STUDENT IN AN ORGANIZED HEALTH CARE EDUCATION/TRAINING PROGRAM

## 2025-03-30 PROCEDURE — 94761 N-INVAS EAR/PLS OXIMETRY MLT: CPT

## 2025-03-30 PROCEDURE — 63600175 PHARM REV CODE 636 W HCPCS: Performed by: STUDENT IN AN ORGANIZED HEALTH CARE EDUCATION/TRAINING PROGRAM

## 2025-03-30 RX ORDER — DIPHENHYDRAMINE HCL 25 MG
25 CAPSULE ORAL ONCE
Status: COMPLETED | OUTPATIENT
Start: 2025-03-30 | End: 2025-03-30

## 2025-03-30 RX ORDER — DIPHENHYDRAMINE HYDROCHLORIDE 50 MG/ML
50 INJECTION, SOLUTION INTRAMUSCULAR; INTRAVENOUS ONCE
Status: COMPLETED | OUTPATIENT
Start: 2025-03-30 | End: 2025-03-30

## 2025-03-30 RX ORDER — HYDRALAZINE HYDROCHLORIDE 20 MG/ML
10 INJECTION INTRAMUSCULAR; INTRAVENOUS EVERY 6 HOURS PRN
Status: DISCONTINUED | OUTPATIENT
Start: 2025-03-30 | End: 2025-04-06 | Stop reason: HOSPADM

## 2025-03-30 RX ADMIN — DIPHENHYDRAMINE HYDROCHLORIDE 50 MG: 50 INJECTION INTRAMUSCULAR; INTRAVENOUS at 09:03

## 2025-03-30 RX ADMIN — MORPHINE SULFATE 10 MG: 10 INJECTION, SOLUTION INTRAMUSCULAR; INTRAVENOUS at 10:03

## 2025-03-30 RX ADMIN — MORPHINE SULFATE 10 MG: 10 INJECTION, SOLUTION INTRAMUSCULAR; INTRAVENOUS at 06:03

## 2025-03-30 RX ADMIN — DIPHENHYDRAMINE HYDROCHLORIDE 25 MG: 50 INJECTION INTRAMUSCULAR; INTRAVENOUS at 12:03

## 2025-03-30 RX ADMIN — PANTOPRAZOLE SODIUM 40 MG: 40 TABLET, DELAYED RELEASE ORAL at 08:03

## 2025-03-30 RX ADMIN — MORPHINE SULFATE 10 MG: 10 INJECTION, SOLUTION INTRAMUSCULAR; INTRAVENOUS at 07:03

## 2025-03-30 RX ADMIN — MORPHINE SULFATE 30 MG: 15 TABLET, FILM COATED, EXTENDED RELEASE ORAL at 02:03

## 2025-03-30 RX ADMIN — MORPHINE SULFATE 10 MG: 10 INJECTION, SOLUTION INTRAMUSCULAR; INTRAVENOUS at 09:03

## 2025-03-30 RX ADMIN — MORPHINE SULFATE 30 MG: 15 TABLET, FILM COATED, EXTENDED RELEASE ORAL at 09:03

## 2025-03-30 RX ADMIN — FOLIC ACID 1 MG: 1 TABLET ORAL at 09:03

## 2025-03-30 RX ADMIN — TIZANIDINE 4 MG: 2 TABLET ORAL at 09:03

## 2025-03-30 RX ADMIN — SODIUM CHLORIDE, POTASSIUM CHLORIDE, SODIUM LACTATE AND CALCIUM CHLORIDE: 600; 310; 30; 20 INJECTION, SOLUTION INTRAVENOUS at 11:03

## 2025-03-30 RX ADMIN — DIPHENHYDRAMINE HYDROCHLORIDE 25 MG: 50 INJECTION INTRAMUSCULAR; INTRAVENOUS at 04:03

## 2025-03-30 RX ADMIN — ACETAMINOPHEN 1000 MG: 500 TABLET ORAL at 02:03

## 2025-03-30 RX ADMIN — MORPHINE SULFATE 10 MG: 10 INJECTION, SOLUTION INTRAMUSCULAR; INTRAVENOUS at 12:03

## 2025-03-30 RX ADMIN — DIPHENHYDRAMINE HYDROCHLORIDE 25 MG: 25 CAPSULE ORAL at 12:03

## 2025-03-30 RX ADMIN — ACETAMINOPHEN 1000 MG: 500 TABLET ORAL at 10:03

## 2025-03-30 RX ADMIN — POLYETHYLENE GLYCOL 3350 17 G: 17 POWDER, FOR SOLUTION ORAL at 09:03

## 2025-03-30 RX ADMIN — GABAPENTIN 600 MG: 300 CAPSULE ORAL at 02:03

## 2025-03-30 RX ADMIN — TIZANIDINE 4 MG: 2 TABLET ORAL at 10:03

## 2025-03-30 RX ADMIN — MORPHINE SULFATE 10 MG: 10 INJECTION, SOLUTION INTRAMUSCULAR; INTRAVENOUS at 04:03

## 2025-03-30 RX ADMIN — SODIUM CHLORIDE, POTASSIUM CHLORIDE, SODIUM LACTATE AND CALCIUM CHLORIDE: 600; 310; 30; 20 INJECTION, SOLUTION INTRAVENOUS at 10:03

## 2025-03-30 RX ADMIN — MUPIROCIN: 20 OINTMENT TOPICAL at 10:03

## 2025-03-30 RX ADMIN — PANTOPRAZOLE SODIUM 40 MG: 40 TABLET, DELAYED RELEASE ORAL at 09:03

## 2025-03-30 RX ADMIN — SODIUM CHLORIDE, POTASSIUM CHLORIDE, SODIUM LACTATE AND CALCIUM CHLORIDE: 600; 310; 30; 20 INJECTION, SOLUTION INTRAVENOUS at 12:03

## 2025-03-30 RX ADMIN — DIPHENHYDRAMINE HYDROCHLORIDE 25 MG: 50 INJECTION INTRAMUSCULAR; INTRAVENOUS at 03:03

## 2025-03-30 RX ADMIN — FLUOXETINE HYDROCHLORIDE 80 MG: 20 CAPSULE ORAL at 09:03

## 2025-03-30 RX ADMIN — SENNOSIDES AND DOCUSATE SODIUM 1 TABLET: 50; 8.6 TABLET ORAL at 09:03

## 2025-03-30 RX ADMIN — PRAZOSIN HYDROCHLORIDE 1 MG: 1 CAPSULE ORAL at 08:03

## 2025-03-30 RX ADMIN — WARFARIN SODIUM 7.5 MG: 7.5 TABLET ORAL at 05:03

## 2025-03-30 RX ADMIN — AMLODIPINE BESYLATE 10 MG: 5 TABLET ORAL at 09:03

## 2025-03-30 RX ADMIN — GABAPENTIN 600 MG: 300 CAPSULE ORAL at 08:03

## 2025-03-30 RX ADMIN — DIPHENHYDRAMINE HYDROCHLORIDE 50 MG: 50 INJECTION INTRAMUSCULAR; INTRAVENOUS at 07:03

## 2025-03-30 RX ADMIN — DIPHENHYDRAMINE HYDROCHLORIDE 25 MG: 50 INJECTION INTRAMUSCULAR; INTRAVENOUS at 10:03

## 2025-03-30 RX ADMIN — DIPHENHYDRAMINE HYDROCHLORIDE 25 MG: 50 INJECTION INTRAMUSCULAR; INTRAVENOUS at 06:03

## 2025-03-30 RX ADMIN — MORPHINE SULFATE 30 MG: 15 TABLET, FILM COATED, EXTENDED RELEASE ORAL at 08:03

## 2025-03-30 RX ADMIN — MORPHINE SULFATE 10 MG: 10 INJECTION, SOLUTION INTRAMUSCULAR; INTRAVENOUS at 03:03

## 2025-03-30 RX ADMIN — ACETAMINOPHEN 1000 MG: 500 TABLET ORAL at 06:03

## 2025-03-30 RX ADMIN — GABAPENTIN 600 MG: 300 CAPSULE ORAL at 09:03

## 2025-03-30 NOTE — PROGRESS NOTES
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 medications. Denies any trauma. Pt denies any other sx such as fever, cough, abd pain or chest pain.     In ED /109. Other vitals stable. Hb 9.7. Retic 1.1. UA unremarkable. Pt given multiple doses of dilaudid, benadryl 1L fluids and zofran. Admitted to hospital medicine for further mngx.     Overview/Hospital Course:  Pt still with severe pain in right lower extremity joints. Right ankle - 'There is an osteochondral defect of the medial talar dome. Ankle mortise is preserved. There are spurs on the calcaneus. There is an os trigonum. 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 There is a joint effusion. There is DJD. 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 No fracture, dislocation, or bone destruction seen.' Right hip - 'No fracture, dislocation, or bone destruction seen.' Ortho eval - no acute interventions inpatient but will need follow up for tricompartmental OA of right knee. PT/OT - cane, outpt PT. Cont pain mngx. Wean as able.     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 Pt used to be on eliquis but changed to lovenox due to too expensive and not covered by  insurance. Now pt cannot afford the Lovenox either and has been off this. Discussed with primary hematologist Dr. Stern and ok with starting warfarin. Monitor INR and set up with coumadin clinic on discharge.     Interval History: Still with pain in right leg. Slightly improved from admission.     Review of Systems   Constitutional:  Negative for fever.   Respiratory:  Negative for shortness of breath.    Cardiovascular:  Negative for chest pain.   Gastrointestinal:  Negative for abdominal pain.   Musculoskeletal:  Positive for arthralgias.   Psychiatric/Behavioral:  Negative for agitation and confusion.      Objective:     Vital Signs (Most Recent):  Temp: 98.7 °F (37.1 °C) (03/30/25 0811)  Pulse: 77 (03/30/25 0811)  Resp: 20 (03/30/25 0911)  BP: 136/84 (03/30/25 0811)  SpO2: (!) 94 % (03/30/25 0811) Vital Signs (24h Range):  Temp:  [98.4 °F (36.9 °C)-98.7 °F (37.1 °C)] 98.7 °F (37.1 °C)  Pulse:  [72-85] 77  Resp:  [14-20] 20  SpO2:  [93 %-96 %] 94 %  BP: (130-168)/(67-96) 136/84     Weight: 94.3 kg (208 lb)  Body mass index is 38.04 kg/m².    Intake/Output Summary (Last 24 hours) at 3/30/2025 0913  Last data filed at 3/30/2025 0623  Gross per 24 hour   Intake 1718 ml   Output 2100 ml   Net -382 ml         Physical Exam  Constitutional:       General: She is not in acute distress.  Pulmonary:      Effort: No respiratory distress.      Breath sounds: No wheezing.   Abdominal:      General: There is no distension.      Tenderness: There is no abdominal tenderness.   Musculoskeletal:      Comments: Right ankle, knee and hip pain. No overlying skin changes    Neurological:      General: No focal deficit present.      Mental Status: She is oriented to person, place, and time.               Significant Labs: All pertinent labs within the past 24 hours have been reviewed.    Significant Imaging: I have reviewed all pertinent imaging results/findings within the past 24 hours.      Assessment & Plan  Acute pain of right  lower extremity  Sickle cell crisis vs worsening of chronic pain     Right ankle - 'There is an osteochondral defect of the medial talar dome. Ankle mortise is preserved. There are spurs on the calcaneus. There is an os trigonum. No acute fracture, dislocation, or bone destruction seen. 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 There is a joint effusion. There is DJD. 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 No fracture, dislocation, or bone destruction seen.'   Right hip - 'No fracture, dislocation, or bone destruction seen. '    Plan -   - Prefers morphine over dilaudid due to severe itching with dilaudid   - Morphine + benadryl PRN - wean as able   - Cont home oral pain meds   - Cont home gabapentin   - Cont folic acid   - Ortho consult - no acute interventions inpatient but will need follow up for tricompartmental OA of right knee.   - IV fluids   - PT - cane and outpt PT     Hypertension  - cont home amlodipine and prazosin   Anxiety and depression  - cont home fluoxetine       History of pulmonary embolism  PE x 2 and supposed to be on anticoagulation indefinitely    Pt used to be on eliquis but changed to lovenox due to too expensive and not covered by insurance. Now pt cannot afford the Lovenox either and has been off this. Discussed with primary hematologist Dr. Stern and ok with starting warfarin. Monitor INR and set up with coumadin clinic on discharge.     VTE Risk Mitigation (From admission, onward)           Ordered     warfarin (COUMADIN) tablet 7.5 mg  Daily         03/29/25 0718     IP VTE HIGH RISK PATIENT  Once         03/25/25 1348     Place sequential compression device  Until discontinued         03/25/25 1348     Reason for No Pharmacological VTE Prophylaxis  Once        Question:  Reasons:  Answer:  Already adequately anticoagulated on oral Anticoagulants    03/25/25 1348                    Discharge  Planning   ALYSE: 4/4/2025     Code Status: Full Code   Medical Readiness for Discharge Date:   Discharge Plan A: Home with family                        Sherine Foreman MD  Department of Hospital Medicine   [facility] Surg ([address])

## 2025-03-30 NOTE — PLAN OF CARE
Patient alert and oriented x4. VSS an on RA throughout night. Complaints of pain in right leg and meds given per MAR. Trace edema in right foot. Pt independent in room. Noted to have been unhooking IV tubing from port to get the the bathroom. Teaching done on infection risk and to ask nurse for assistance. Fall and safety precautions in place. Call bell within reach and no needs at this time.    Problem: Adult Inpatient Plan of Care  Goal: Plan of Care Review  Outcome: Progressing  Goal: Patient-Specific Goal (Individualized)  Outcome: Progressing  Goal: Absence of Hospital-Acquired Illness or Injury  Outcome: Progressing  Goal: Optimal Comfort and Wellbeing  Outcome: Progressing  Goal: Readiness for Transition of Care  Outcome: Progressing     Problem: Acute Kidney Injury/Impairment  Goal: Fluid and Electrolyte Balance  Outcome: Progressing  Goal: Improved Oral Intake  Outcome: Progressing  Goal: Effective Renal Function  Outcome: Progressing     Problem: Infection  Goal: Absence of Infection Signs and Symptoms  Outcome: Progressing     Problem: Sickle Cell Disease  Goal: Optimal Cerebral Tissue Perfusion  Outcome: Progressing  Goal: Optimal Coping with Sickle Cell Disease  Outcome: Progressing  Goal: Effective Tissue Perfusion  Outcome: Progressing  Goal: Absence of Infection Signs and Symptoms  Outcome: Progressing  Goal: Optimal Pain Control and Function  Outcome: Progressing  Goal: Optimal Oxygenation  Outcome: Progressing     Problem: Pain Acute  Goal: Optimal Pain Control and Function  Outcome: Progressing     Problem: Gas Exchange Impaired  Goal: Optimal Gas Exchange  Outcome: Progressing     Problem: Fall Injury Risk  Goal: Absence of Fall and Fall-Related Injury  Outcome: Progressing

## 2025-03-30 NOTE — SUBJECTIVE & OBJECTIVE
Interval History: Still with pain in right leg. Slightly improved from admission.     Review of Systems   Constitutional:  Negative for fever.   Respiratory:  Negative for shortness of breath.    Cardiovascular:  Negative for chest pain.   Gastrointestinal:  Negative for abdominal pain.   Musculoskeletal:  Positive for arthralgias.   Psychiatric/Behavioral:  Negative for agitation and confusion.      Objective:     Vital Signs (Most Recent):  Temp: 98.7 °F (37.1 °C) (03/30/25 0811)  Pulse: 77 (03/30/25 0811)  Resp: 20 (03/30/25 0911)  BP: 136/84 (03/30/25 0811)  SpO2: (!) 94 % (03/30/25 0811) Vital Signs (24h Range):  Temp:  [98.4 °F (36.9 °C)-98.7 °F (37.1 °C)] 98.7 °F (37.1 °C)  Pulse:  [72-85] 77  Resp:  [14-20] 20  SpO2:  [93 %-96 %] 94 %  BP: (130-168)/(67-96) 136/84     Weight: 94.3 kg (208 lb)  Body mass index is 38.04 kg/m².    Intake/Output Summary (Last 24 hours) at 3/30/2025 0955  Last data filed at 3/30/2025 0623  Gross per 24 hour   Intake 1718 ml   Output 2100 ml   Net -382 ml         Physical Exam  Constitutional:       General: She is not in acute distress.  Pulmonary:      Effort: No respiratory distress.      Breath sounds: No wheezing.   Abdominal:      General: There is no distension.      Tenderness: There is no abdominal tenderness.   Musculoskeletal:      Comments: Right ankle, knee and hip pain. No overlying skin changes    Neurological:      General: No focal deficit present.      Mental Status: She is oriented to person, place, and time.               Significant Labs: All pertinent labs within the past 24 hours have been reviewed.    Significant Imaging: I have reviewed all pertinent imaging results/findings within the past 24 hours.

## 2025-03-31 LAB
ALBUMIN SERPL BCP-MCNC: 3.3 G/DL (ref 3.5–5.2)
ALP SERPL-CCNC: 73 UNIT/L (ref 40–150)
ALT SERPL W/O P-5'-P-CCNC: 20 UNIT/L (ref 10–44)
ANION GAP (OHS): 7 MMOL/L (ref 8–16)
AST SERPL-CCNC: 17 UNIT/L (ref 11–45)
BILIRUB SERPL-MCNC: 0.4 MG/DL (ref 0.1–1)
BUN SERPL-MCNC: 16 MG/DL (ref 6–20)
CALCIUM SERPL-MCNC: 8.8 MG/DL (ref 8.7–10.5)
CHLORIDE SERPL-SCNC: 103 MMOL/L (ref 95–110)
CO2 SERPL-SCNC: 29 MMOL/L (ref 23–29)
CREAT SERPL-MCNC: 1.1 MG/DL (ref 0.5–1.4)
ERYTHROCYTE [DISTWIDTH] IN BLOOD BY AUTOMATED COUNT: 22.1 % (ref 11.5–14.5)
GFR SERPLBLD CREATININE-BSD FMLA CKD-EPI: >60 ML/MIN/1.73/M2
GLUCOSE SERPL-MCNC: 88 MG/DL (ref 70–110)
HCT VFR BLD AUTO: 25.2 % (ref 37–48.5)
HGB BLD-MCNC: 8.2 GM/DL (ref 12–16)
INR PPP: 2 (ref 0.8–1.2)
MCH RBC QN AUTO: 23.2 PG (ref 27–50)
MCHC RBC AUTO-ENTMCNC: 32.5 G/DL (ref 32–36)
MCV RBC AUTO: 71 FL (ref 82–98)
PLATELET # BLD AUTO: 145 K/UL (ref 150–450)
PMV BLD AUTO: 9.4 FL (ref 9.2–12.9)
POTASSIUM SERPL-SCNC: 4.1 MMOL/L (ref 3.5–5.1)
PROT SERPL-MCNC: 7 GM/DL (ref 6–8.4)
PROTHROMBIN TIME: 21.4 SECONDS (ref 9–12.5)
RBC # BLD AUTO: 3.54 M/UL (ref 4–5.4)
SODIUM SERPL-SCNC: 139 MMOL/L (ref 136–145)
WBC # BLD AUTO: 8.72 K/UL (ref 3.9–12.7)

## 2025-03-31 PROCEDURE — 25000003 PHARM REV CODE 250: Performed by: STUDENT IN AN ORGANIZED HEALTH CARE EDUCATION/TRAINING PROGRAM

## 2025-03-31 PROCEDURE — 63600175 PHARM REV CODE 636 W HCPCS: Performed by: STUDENT IN AN ORGANIZED HEALTH CARE EDUCATION/TRAINING PROGRAM

## 2025-03-31 PROCEDURE — 85610 PROTHROMBIN TIME: CPT | Performed by: STUDENT IN AN ORGANIZED HEALTH CARE EDUCATION/TRAINING PROGRAM

## 2025-03-31 PROCEDURE — 85027 COMPLETE CBC AUTOMATED: CPT | Performed by: STUDENT IN AN ORGANIZED HEALTH CARE EDUCATION/TRAINING PROGRAM

## 2025-03-31 PROCEDURE — 36415 COLL VENOUS BLD VENIPUNCTURE: CPT | Performed by: STUDENT IN AN ORGANIZED HEALTH CARE EDUCATION/TRAINING PROGRAM

## 2025-03-31 PROCEDURE — 82040 ASSAY OF SERUM ALBUMIN: CPT | Performed by: STUDENT IN AN ORGANIZED HEALTH CARE EDUCATION/TRAINING PROGRAM

## 2025-03-31 PROCEDURE — 21400001 HC TELEMETRY ROOM

## 2025-03-31 RX ORDER — DIPHENHYDRAMINE HYDROCHLORIDE 50 MG/ML
50 INJECTION, SOLUTION INTRAMUSCULAR; INTRAVENOUS ONCE
Status: COMPLETED | OUTPATIENT
Start: 2025-03-31 | End: 2025-03-31

## 2025-03-31 RX ORDER — WARFARIN SODIUM 5 MG/1
5 TABLET ORAL DAILY
Status: DISCONTINUED | OUTPATIENT
Start: 2025-03-31 | End: 2025-04-01

## 2025-03-31 RX ADMIN — MORPHINE SULFATE 10 MG: 10 INJECTION, SOLUTION INTRAMUSCULAR; INTRAVENOUS at 07:03

## 2025-03-31 RX ADMIN — MORPHINE SULFATE 10 MG: 10 INJECTION, SOLUTION INTRAMUSCULAR; INTRAVENOUS at 10:03

## 2025-03-31 RX ADMIN — MORPHINE SULFATE 30 MG: 15 TABLET, FILM COATED, EXTENDED RELEASE ORAL at 09:03

## 2025-03-31 RX ADMIN — MORPHINE SULFATE 30 MG: 15 TABLET, FILM COATED, EXTENDED RELEASE ORAL at 08:03

## 2025-03-31 RX ADMIN — DIPHENHYDRAMINE HYDROCHLORIDE 50 MG: 50 INJECTION INTRAMUSCULAR; INTRAVENOUS at 01:03

## 2025-03-31 RX ADMIN — DIPHENHYDRAMINE HYDROCHLORIDE 25 MG: 50 INJECTION INTRAMUSCULAR; INTRAVENOUS at 08:03

## 2025-03-31 RX ADMIN — DIPHENHYDRAMINE HYDROCHLORIDE 25 MG: 50 INJECTION INTRAMUSCULAR; INTRAVENOUS at 05:03

## 2025-03-31 RX ADMIN — TIZANIDINE 4 MG: 2 TABLET ORAL at 08:03

## 2025-03-31 RX ADMIN — POLYETHYLENE GLYCOL 3350 17 G: 17 POWDER, FOR SOLUTION ORAL at 08:03

## 2025-03-31 RX ADMIN — MORPHINE SULFATE 10 MG: 10 INJECTION, SOLUTION INTRAMUSCULAR; INTRAVENOUS at 08:03

## 2025-03-31 RX ADMIN — MORPHINE SULFATE 10 MG: 10 INJECTION, SOLUTION INTRAMUSCULAR; INTRAVENOUS at 04:03

## 2025-03-31 RX ADMIN — AMLODIPINE BESYLATE 10 MG: 5 TABLET ORAL at 08:03

## 2025-03-31 RX ADMIN — DIPHENHYDRAMINE HYDROCHLORIDE 25 MG: 50 INJECTION INTRAMUSCULAR; INTRAVENOUS at 01:03

## 2025-03-31 RX ADMIN — DIPHENHYDRAMINE HYDROCHLORIDE 50 MG: 50 INJECTION INTRAMUSCULAR; INTRAVENOUS at 08:03

## 2025-03-31 RX ADMIN — MORPHINE SULFATE 10 MG: 10 INJECTION, SOLUTION INTRAMUSCULAR; INTRAVENOUS at 01:03

## 2025-03-31 RX ADMIN — DIPHENHYDRAMINE HYDROCHLORIDE 25 MG: 50 INJECTION INTRAMUSCULAR; INTRAVENOUS at 04:03

## 2025-03-31 RX ADMIN — PANTOPRAZOLE SODIUM 40 MG: 40 TABLET, DELAYED RELEASE ORAL at 08:03

## 2025-03-31 RX ADMIN — SENNOSIDES AND DOCUSATE SODIUM 1 TABLET: 50; 8.6 TABLET ORAL at 08:03

## 2025-03-31 RX ADMIN — DIPHENHYDRAMINE HYDROCHLORIDE 25 MG: 50 INJECTION INTRAMUSCULAR; INTRAVENOUS at 07:03

## 2025-03-31 RX ADMIN — ACETAMINOPHEN 1000 MG: 500 TABLET ORAL at 07:03

## 2025-03-31 RX ADMIN — ACETAMINOPHEN 1000 MG: 500 TABLET ORAL at 09:03

## 2025-03-31 RX ADMIN — MORPHINE SULFATE 30 MG: 15 TABLET, FILM COATED, EXTENDED RELEASE ORAL at 04:03

## 2025-03-31 RX ADMIN — GABAPENTIN 600 MG: 300 CAPSULE ORAL at 08:03

## 2025-03-31 RX ADMIN — FLUOXETINE HYDROCHLORIDE 80 MG: 20 CAPSULE ORAL at 08:03

## 2025-03-31 RX ADMIN — WARFARIN SODIUM 5 MG: 5 TABLET ORAL at 05:03

## 2025-03-31 RX ADMIN — MORPHINE SULFATE 10 MG: 10 INJECTION, SOLUTION INTRAMUSCULAR; INTRAVENOUS at 05:03

## 2025-03-31 RX ADMIN — DIPHENHYDRAMINE HYDROCHLORIDE 25 MG: 50 INJECTION INTRAMUSCULAR; INTRAVENOUS at 10:03

## 2025-03-31 RX ADMIN — GABAPENTIN 600 MG: 300 CAPSULE ORAL at 04:03

## 2025-03-31 RX ADMIN — PRAZOSIN HYDROCHLORIDE 1 MG: 1 CAPSULE ORAL at 08:03

## 2025-03-31 RX ADMIN — TIZANIDINE 4 MG: 2 TABLET ORAL at 07:03

## 2025-03-31 RX ADMIN — FOLIC ACID 1 MG: 1 TABLET ORAL at 08:03

## 2025-03-31 NOTE — PLAN OF CARE
Problem: Infection  Goal: Absence of Infection Signs and Symptoms  Outcome: Progressing     Problem: Pain Acute  Goal: Optimal Pain Control and Function  Outcome: Progressing     Problem: Gas Exchange Impaired  Goal: Optimal Gas Exchange  Outcome: Progressing     Problem: Fall Injury Risk  Goal: Absence of Fall and Fall-Related Injury  Outcome: Progressing

## 2025-03-31 NOTE — PLAN OF CARE
03/31/25 1418   Discharge Reassessment   Assessment Type Discharge Planning Reassessment   Did the patient's condition or plan change since previous assessment? No   Discharge Plan discussed with: Patient   Communicated ALYSE with patient/caregiver Date not available/Unable to determine   Discharge Plan A Home with family   Discharge Plan B Home with family   DME Needed Upon Discharge  none   Transition of Care Barriers None   Why the patient remains in the hospital Requires continued medical care   Post-Acute Status   Discharge Delays None known at this time     Patient still being medically treated.

## 2025-03-31 NOTE — ASSESSMENT & PLAN NOTE
Sickle cell crisis vs worsening of chronic pain     Right ankle - 'There is an osteochondral defect of the medial talar dome. Ankle mortise is preserved. There are spurs on the calcaneus. There is an os trigonum. No acute fracture, dislocation, or bone destruction seen. 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 There is a joint effusion. There is DJD. 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 No fracture, dislocation, or bone destruction seen.'   Right hip - 'No fracture, dislocation, or bone destruction seen. '    Plan -   - Prefers morphine over dilaudid due to severe itching with dilaudid   - Morphine + benadryl PRN - wean as able   - Cont home oral pain meds   - Cont home gabapentin   - Cont folic acid   - Ortho consult - no acute interventions inpatient but will need follow up for tricompartmental OA of right knee.   - IV fluids - held 3/31  - PT - cane and outpt PT

## 2025-03-31 NOTE — SUBJECTIVE & OBJECTIVE
Interval History: Still with pain in right leg. Slightly improved from admission. Fluids stopped for feeling bloated.     Review of Systems   Constitutional:  Negative for fever.   Respiratory:  Negative for shortness of breath.    Cardiovascular:  Negative for chest pain.   Gastrointestinal:  Negative for abdominal pain.   Musculoskeletal:  Positive for arthralgias.   Psychiatric/Behavioral:  Negative for agitation and confusion.      Objective:     Vital Signs (Most Recent):  Temp: 98.5 °F (36.9 °C) (03/31/25 0830)  Pulse: 84 (03/31/25 1412)  Resp: 16 (03/31/25 1412)  BP: (!) 140/82 (03/31/25 1412)  SpO2: 97 % (03/31/25 1412) Vital Signs (24h Range):  Temp:  [98.4 °F (36.9 °C)-99.5 °F (37.5 °C)] 98.5 °F (36.9 °C)  Pulse:  [] 84  Resp:  [16-22] 16  SpO2:  [92 %-97 %] 97 %  BP: (113-168)/(63-96) 140/82     Weight: 94.3 kg (208 lb)  Body mass index is 38.04 kg/m².    Intake/Output Summary (Last 24 hours) at 3/31/2025 1420  Last data filed at 3/31/2025 0848  Gross per 24 hour   Intake 2731.11 ml   Output 1600 ml   Net 1131.11 ml         Physical Exam  Constitutional:       General: She is not in acute distress.  Pulmonary:      Effort: No respiratory distress.      Breath sounds: No wheezing.   Abdominal:      General: There is no distension.      Tenderness: There is no abdominal tenderness.   Musculoskeletal:      Comments: Right ankle, knee and hip pain. No overlying skin changes    Neurological:      General: No focal deficit present.      Mental Status: She is oriented to person, place, and time.               Significant Labs: All pertinent labs within the past 24 hours have been reviewed.    Significant Imaging: I have reviewed all pertinent imaging results/findings within the past 24 hours.

## 2025-03-31 NOTE — PROGRESS NOTES
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 medications. Denies any trauma. Pt denies any other sx such as fever, cough, abd pain or chest pain.     In ED /109. Other vitals stable. Hb 9.7. Retic 1.1. UA unremarkable. Pt given multiple doses of dilaudid, benadryl 1L fluids and zofran. Admitted to hospital medicine for further mngx.     Overview/Hospital Course:  Pt still with severe pain in right lower extremity joints. Right ankle - 'There is an osteochondral defect of the medial talar dome. Ankle mortise is preserved. There are spurs on the calcaneus. There is an os trigonum. 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 There is a joint effusion. There is DJD. 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 No fracture, dislocation, or bone destruction seen.' Right hip - 'No fracture, dislocation, or bone destruction seen.' Ortho eval - no acute interventions inpatient but will need follow up for tricompartmental OA of right knee. PT/OT - cane, outpt PT. Cont pain mngx. Wean as able.     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 Pt used to be on eliquis but changed to lovenox due to too expensive and not covered by  insurance. Now pt cannot afford the Lovenox either and has been off this. Discussed with primary hematologist Dr. Stern and ok with starting warfarin. Monitor INR and set up with coumadin clinic on discharge.     Interval History: Still with pain in right leg. Slightly improved from admission. Fluids stopped for feeling bloated.     Review of Systems   Constitutional:  Negative for fever.   Respiratory:  Negative for shortness of breath.    Cardiovascular:  Negative for chest pain.   Gastrointestinal:  Negative for abdominal pain.   Musculoskeletal:  Positive for arthralgias.   Psychiatric/Behavioral:  Negative for agitation and confusion.      Objective:     Vital Signs (Most Recent):  Temp: 98.5 °F (36.9 °C) (03/31/25 0830)  Pulse: 84 (03/31/25 1412)  Resp: 16 (03/31/25 1412)  BP: (!) 140/82 (03/31/25 1412)  SpO2: 97 % (03/31/25 1412) Vital Signs (24h Range):  Temp:  [98.4 °F (36.9 °C)-99.5 °F (37.5 °C)] 98.5 °F (36.9 °C)  Pulse:  [] 84  Resp:  [16-22] 16  SpO2:  [92 %-97 %] 97 %  BP: (113-168)/(63-96) 140/82     Weight: 94.3 kg (208 lb)  Body mass index is 38.04 kg/m².    Intake/Output Summary (Last 24 hours) at 3/31/2025 1420  Last data filed at 3/31/2025 0848  Gross per 24 hour   Intake 2731.11 ml   Output 1600 ml   Net 1131.11 ml         Physical Exam  Constitutional:       General: She is not in acute distress.  Pulmonary:      Effort: No respiratory distress.      Breath sounds: No wheezing.   Abdominal:      General: There is no distension.      Tenderness: There is no abdominal tenderness.   Musculoskeletal:      Comments: Right ankle, knee and hip pain. No overlying skin changes    Neurological:      General: No focal deficit present.      Mental Status: She is oriented to person, place, and time.               Significant Labs: All pertinent labs within the past 24 hours have been reviewed.    Significant Imaging: I have reviewed all pertinent imaging results/findings within the past 24  hours.      Assessment & Plan  Acute pain of right lower extremity  Sickle cell crisis vs worsening of chronic pain     Right ankle - 'There is an osteochondral defect of the medial talar dome. Ankle mortise is preserved. There are spurs on the calcaneus. There is an os trigonum. No acute fracture, dislocation, or bone destruction seen. 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 There is a joint effusion. There is DJD. 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 No fracture, dislocation, or bone destruction seen.'   Right hip - 'No fracture, dislocation, or bone destruction seen. '    Plan -   - Prefers morphine over dilaudid due to severe itching with dilaudid   - Morphine + benadryl PRN - wean as able   - Cont home oral pain meds   - Cont home gabapentin   - Cont folic acid   - Ortho consult - no acute interventions inpatient but will need follow up for tricompartmental OA of right knee.   - IV fluids - held 3/31  - PT - cane and outpt PT     Hypertension  - cont home amlodipine and prazosin   Anxiety and depression  - cont home fluoxetine       History of pulmonary embolism  PE x 2 and supposed to be on anticoagulation indefinitely    Pt used to be on eliquis but changed to lovenox due to too expensive and not covered by insurance. Now pt cannot afford the Lovenox either and has been off this. Discussed with primary hematologist Dr. Stern and ok with starting warfarin. Monitor INR and set up with coumadin clinic on discharge.     VTE Risk Mitigation (From admission, onward)           Ordered     warfarin (COUMADIN) tablet 5 mg  Daily         03/31/25 0551     IP VTE HIGH RISK PATIENT  Once         03/25/25 1348     Place sequential compression device  Until discontinued         03/25/25 1348     Reason for No Pharmacological VTE Prophylaxis  Once        Question:  Reasons:  Answer:  Already adequately anticoagulated on oral  Anticoagulants    03/25/25 1348                    Discharge Planning   ALYSE: 4/4/2025     Code Status: Full Code   Medical Readiness for Discharge Date:   Discharge Plan A: Home with family   Discharge Delays: None known at this time                    Sherine Foreman MD  Department of Hospital Medicine   [facility] ([address])

## 2025-04-01 LAB
ALBUMIN SERPL BCP-MCNC: 3.6 G/DL (ref 3.5–5.2)
ALP SERPL-CCNC: 89 UNIT/L (ref 40–150)
ALT SERPL W/O P-5'-P-CCNC: 20 UNIT/L (ref 10–44)
ANION GAP (OHS): 11 MMOL/L (ref 8–16)
AST SERPL-CCNC: 18 UNIT/L (ref 11–45)
BILIRUB SERPL-MCNC: 0.5 MG/DL (ref 0.1–1)
BUN SERPL-MCNC: 16 MG/DL (ref 6–20)
CALCIUM SERPL-MCNC: 9.6 MG/DL (ref 8.7–10.5)
CHLORIDE SERPL-SCNC: 104 MMOL/L (ref 95–110)
CO2 SERPL-SCNC: 26 MMOL/L (ref 23–29)
CREAT SERPL-MCNC: 1.2 MG/DL (ref 0.5–1.4)
ERYTHROCYTE [DISTWIDTH] IN BLOOD BY AUTOMATED COUNT: 22.5 % (ref 11.5–14.5)
GFR SERPLBLD CREATININE-BSD FMLA CKD-EPI: 57 ML/MIN/1.73/M2
GLUCOSE SERPL-MCNC: 52 MG/DL (ref 70–110)
HCT VFR BLD AUTO: 27.3 % (ref 37–48.5)
HGB BLD-MCNC: 8.9 GM/DL (ref 12–16)
INR PPP: 2.9 (ref 0.8–1.2)
MCH RBC QN AUTO: 23.2 PG (ref 27–31)
MCHC RBC AUTO-ENTMCNC: 32.6 G/DL (ref 32–36)
MCV RBC AUTO: 71 FL (ref 82–98)
PLATELET # BLD AUTO: 193 K/UL (ref 150–450)
PMV BLD AUTO: 9.7 FL (ref 9.2–12.9)
POCT GLUCOSE: 91 MG/DL (ref 70–110)
POTASSIUM SERPL-SCNC: 4.2 MMOL/L (ref 3.5–5.1)
PROT SERPL-MCNC: 7.6 GM/DL (ref 6–8.4)
PROTHROMBIN TIME: 30.5 SECONDS (ref 9–12.5)
RBC # BLD AUTO: 3.84 M/UL (ref 4–5.4)
SODIUM SERPL-SCNC: 141 MMOL/L (ref 136–145)
WBC # BLD AUTO: 7.11 K/UL (ref 3.9–12.7)

## 2025-04-01 PROCEDURE — 11000001 HC ACUTE MED/SURG PRIVATE ROOM

## 2025-04-01 PROCEDURE — 85027 COMPLETE CBC AUTOMATED: CPT | Performed by: STUDENT IN AN ORGANIZED HEALTH CARE EDUCATION/TRAINING PROGRAM

## 2025-04-01 PROCEDURE — 63600175 PHARM REV CODE 636 W HCPCS: Performed by: STUDENT IN AN ORGANIZED HEALTH CARE EDUCATION/TRAINING PROGRAM

## 2025-04-01 PROCEDURE — 25000003 PHARM REV CODE 250: Performed by: STUDENT IN AN ORGANIZED HEALTH CARE EDUCATION/TRAINING PROGRAM

## 2025-04-01 PROCEDURE — 63600175 PHARM REV CODE 636 W HCPCS: Performed by: HOSPITALIST

## 2025-04-01 PROCEDURE — 80053 COMPREHEN METABOLIC PANEL: CPT | Performed by: STUDENT IN AN ORGANIZED HEALTH CARE EDUCATION/TRAINING PROGRAM

## 2025-04-01 PROCEDURE — 25000003 PHARM REV CODE 250: Performed by: HOSPITALIST

## 2025-04-01 PROCEDURE — 85610 PROTHROMBIN TIME: CPT | Performed by: STUDENT IN AN ORGANIZED HEALTH CARE EDUCATION/TRAINING PROGRAM

## 2025-04-01 PROCEDURE — 36415 COLL VENOUS BLD VENIPUNCTURE: CPT | Performed by: STUDENT IN AN ORGANIZED HEALTH CARE EDUCATION/TRAINING PROGRAM

## 2025-04-01 PROCEDURE — 94761 N-INVAS EAR/PLS OXIMETRY MLT: CPT

## 2025-04-01 RX ORDER — DIPHENHYDRAMINE HYDROCHLORIDE 50 MG/ML
50 INJECTION, SOLUTION INTRAMUSCULAR; INTRAVENOUS
Status: DISCONTINUED | OUTPATIENT
Start: 2025-04-01 | End: 2025-04-06 | Stop reason: HOSPADM

## 2025-04-01 RX ADMIN — DIPHENHYDRAMINE HYDROCHLORIDE 50 MG: 50 INJECTION, SOLUTION INTRAMUSCULAR; INTRAVENOUS at 04:04

## 2025-04-01 RX ADMIN — DIPHENHYDRAMINE HYDROCHLORIDE 50 MG: 50 INJECTION, SOLUTION INTRAMUSCULAR; INTRAVENOUS at 12:04

## 2025-04-01 RX ADMIN — WARFARIN SODIUM 3 MG: 2 TABLET ORAL at 04:04

## 2025-04-01 RX ADMIN — MORPHINE SULFATE 10 MG: 10 INJECTION, SOLUTION INTRAMUSCULAR; INTRAVENOUS at 06:04

## 2025-04-01 RX ADMIN — DIPHENHYDRAMINE HYDROCHLORIDE 25 MG: 50 INJECTION INTRAMUSCULAR; INTRAVENOUS at 03:04

## 2025-04-01 RX ADMIN — MORPHINE SULFATE 30 MG: 15 TABLET, FILM COATED, EXTENDED RELEASE ORAL at 09:04

## 2025-04-01 RX ADMIN — PRAZOSIN HYDROCHLORIDE 1 MG: 1 CAPSULE ORAL at 09:04

## 2025-04-01 RX ADMIN — MORPHINE SULFATE 10 MG: 10 INJECTION, SOLUTION INTRAMUSCULAR; INTRAVENOUS at 09:04

## 2025-04-01 RX ADMIN — DIPHENHYDRAMINE HYDROCHLORIDE 50 MG: 50 INJECTION, SOLUTION INTRAMUSCULAR; INTRAVENOUS at 09:04

## 2025-04-01 RX ADMIN — PANTOPRAZOLE SODIUM 40 MG: 40 TABLET, DELAYED RELEASE ORAL at 09:04

## 2025-04-01 RX ADMIN — MORPHINE SULFATE 30 MG: 15 TABLET, FILM COATED, EXTENDED RELEASE ORAL at 03:04

## 2025-04-01 RX ADMIN — MORPHINE SULFATE 10 MG: 10 INJECTION, SOLUTION INTRAMUSCULAR; INTRAVENOUS at 12:04

## 2025-04-01 RX ADMIN — GABAPENTIN 600 MG: 300 CAPSULE ORAL at 09:04

## 2025-04-01 RX ADMIN — GABAPENTIN 600 MG: 300 CAPSULE ORAL at 03:04

## 2025-04-01 RX ADMIN — AMLODIPINE BESYLATE 10 MG: 5 TABLET ORAL at 09:04

## 2025-04-01 RX ADMIN — ACETAMINOPHEN 1000 MG: 500 TABLET ORAL at 06:04

## 2025-04-01 RX ADMIN — FLUOXETINE HYDROCHLORIDE 80 MG: 20 CAPSULE ORAL at 09:04

## 2025-04-01 RX ADMIN — POLYETHYLENE GLYCOL 3350 17 G: 17 POWDER, FOR SOLUTION ORAL at 09:04

## 2025-04-01 RX ADMIN — TIZANIDINE 4 MG: 2 TABLET ORAL at 04:04

## 2025-04-01 RX ADMIN — DIPHENHYDRAMINE HYDROCHLORIDE 50 MG: 50 INJECTION, SOLUTION INTRAMUSCULAR; INTRAVENOUS at 06:04

## 2025-04-01 RX ADMIN — FOLIC ACID 1 MG: 1 TABLET ORAL at 09:04

## 2025-04-01 RX ADMIN — ACETAMINOPHEN 1000 MG: 500 TABLET ORAL at 03:04

## 2025-04-01 RX ADMIN — MORPHINE SULFATE 10 MG: 10 INJECTION, SOLUTION INTRAMUSCULAR; INTRAVENOUS at 03:04

## 2025-04-01 RX ADMIN — MORPHINE SULFATE 10 MG: 10 INJECTION, SOLUTION INTRAMUSCULAR; INTRAVENOUS at 04:04

## 2025-04-01 RX ADMIN — DIPHENHYDRAMINE HYDROCHLORIDE 25 MG: 50 INJECTION INTRAMUSCULAR; INTRAVENOUS at 06:04

## 2025-04-01 RX ADMIN — SENNOSIDES AND DOCUSATE SODIUM 1 TABLET: 50; 8.6 TABLET ORAL at 09:04

## 2025-04-01 RX ADMIN — ACETAMINOPHEN 1000 MG: 500 TABLET ORAL at 09:04

## 2025-04-01 RX ADMIN — TIZANIDINE 4 MG: 2 TABLET ORAL at 07:04

## 2025-04-01 NOTE — PLAN OF CARE
04/01/25 1545   Medicare Message   Important Message from Medicare regarding Discharge Appeal Rights Given to patient/caregiver;Explained to patient/caregiver;Signed/date by patient/caregiver   Date IMM was signed 04/01/25   Time IMM was signed 2055

## 2025-04-01 NOTE — ASSESSMENT & PLAN NOTE
PE x 2 and supposed to be on anticoagulation indefinitely    Pt used to be on eliquis but changed to lovenox due to too expensive and not covered by insurance. Now pt cannot afford the Lovenox either and has been off this. Discussed with primary hematologist Dr. Stern and ok with starting warfarin. Monitor INR and set up with coumadin clinic on discharge.   4/1 - INR increased to 2.9 since yesterday (2).  Given the velocity of increase will change dose of warfarin to 3 mg.

## 2025-04-01 NOTE — PLAN OF CARE
Problem: Adult Inpatient Plan of Care  Goal: Plan of Care Review  Outcome: Progressing  Goal: Patient-Specific Goal (Individualized)  Outcome: Progressing  Goal: Absence of Hospital-Acquired Illness or Injury  Outcome: Progressing  Goal: Optimal Comfort and Wellbeing  Outcome: Progressing  Goal: Readiness for Transition of Care  Outcome: Progressing     Problem: Acute Kidney Injury/Impairment  Goal: Fluid and Electrolyte Balance  Outcome: Progressing  Goal: Improved Oral Intake  Outcome: Progressing  Goal: Effective Renal Function  Outcome: Progressing     Problem: Infection  Goal: Absence of Infection Signs and Symptoms  Outcome: Progressing    Pain well controlled today.

## 2025-04-01 NOTE — SUBJECTIVE & OBJECTIVE
Interval History: Assumed patient care.  Complains of severe pain in RLE but hoping to be discharged soon.  INR increasing rapidly and is already therapeutic.    Review of Systems   Constitutional:  Negative for chills and fever.   Respiratory:  Negative for cough and shortness of breath.    Cardiovascular:  Negative for chest pain and palpitations.   Musculoskeletal:  Positive for arthralgias and gait problem.        RLE pain     Objective:     Vital Signs (Most Recent):  Temp: 98.1 °F (36.7 °C) (04/01/25 0842)  Pulse: 79 (04/01/25 0842)  Resp: 17 (04/01/25 0931)  BP: 106/60 (04/01/25 0842)  SpO2: 97 % (04/01/25 0842) Vital Signs (24h Range):  Temp:  [98 °F (36.7 °C)-98.7 °F (37.1 °C)] 98.1 °F (36.7 °C)  Pulse:  [] 79  Resp:  [14-19] 17  SpO2:  [92 %-99 %] 97 %  BP: (106-162)/(60-98) 106/60     Weight: 94.3 kg (208 lb)  Body mass index is 38.04 kg/m².    Intake/Output Summary (Last 24 hours) at 4/1/2025 0933  Last data filed at 4/1/2025 0800  Gross per 24 hour   Intake 740 ml   Output 400 ml   Net 340 ml         Physical Exam  Cardiovascular:      Rate and Rhythm: Normal rate and regular rhythm.      Heart sounds: Normal heart sounds. No murmur heard.     No gallop.   Pulmonary:      Effort: Pulmonary effort is normal.      Breath sounds: Normal breath sounds.   Abdominal:      General: Bowel sounds are normal.      Palpations: Abdomen is soft.   Skin:     General: Skin is warm and dry.   Neurological:      General: No focal deficit present.      Mental Status: She is alert.   Psychiatric:         Mood and Affect: Mood normal.         Behavior: Behavior normal.               Significant Labs: All pertinent labs within the past 24 hours have been reviewed.    Significant Imaging: I have reviewed all pertinent imaging results/findings within the past 24 hours.

## 2025-04-01 NOTE — PROGRESS NOTES
[facility] Surg [address] Medicine  Progress Note    Patient Name: Nazanin Malone  MRN: [id number]  Patient Class: IP- Inpatient   Admission Date: 3/25/2025  Length of Stay: 7 days  Attending Physician: Oksana Phillips MD  Primary Care Provider: Kezia, Primary Doctor        Subjective     Principal Problem:Acute pain of right lower extremity        HPI:  HPI by Dr. 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 medications. Denies any trauma. Pt denies any other sx such as fever, cough, abd pain or chest pain.     In ED /109. Other vitals stable. Hb 9.7. Retic 1.1. UA unremarkable. Pt given multiple doses of dilaudid, benadryl 1L fluids and zofran. Admitted to hospital medicine for further mngx.     Overview/Hospital Course:  Pt still with severe pain in right lower extremity joints.  Imagine showed tricompartmental osteoarthritis of the right knee.  PT/OT consulted, recommended outpatient PT and ambulate with a cane.  Orthopedic surgery evaluated patient and recommended outpatient follow up.      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 Pt used to be on eliquis but changed to lovenox due to too expensive and not covered by insurance. Now pt cannot afford the Lovenox either and has been off this. Discussed with primary hematologist Dr. Stern and ok with starting warfarin. Monitor INR and set up with coumadin clinic on discharge.     Interval History: Assumed patient care.  Complains of severe pain in RLE but hoping to be discharged soon.  INR increasing rapidly and is already therapeutic.    Review of Systems   Constitutional:  Negative for chills and fever.   Respiratory:   Negative for cough and shortness of breath.    Cardiovascular:  Negative for chest pain and palpitations.   Musculoskeletal:  Positive for arthralgias and gait problem.        RLE pain     Objective:     Vital Signs (Most Recent):  Temp: 98.1 °F (36.7 °C) (04/01/25 0842)  Pulse: 79 (04/01/25 0842)  Resp: 17 (04/01/25 0931)  BP: 106/60 (04/01/25 0842)  SpO2: 97 % (04/01/25 0842) Vital Signs (24h Range):  Temp:  [98 °F (36.7 °C)-98.7 °F (37.1 °C)] 98.1 °F (36.7 °C)  Pulse:  [] 79  Resp:  [14-19] 17  SpO2:  [92 %-99 %] 97 %  BP: (106-162)/(60-98) 106/60     Weight: 94.3 kg (208 lb)  Body mass index is 38.04 kg/m².    Intake/Output Summary (Last 24 hours) at 4/1/2025 0933  Last data filed at 4/1/2025 0800  Gross per 24 hour   Intake 740 ml   Output 400 ml   Net 340 ml         Physical Exam  Cardiovascular:      Rate and Rhythm: Normal rate and regular rhythm.      Heart sounds: Normal heart sounds. No murmur heard.     No gallop.   Pulmonary:      Effort: Pulmonary effort is normal.      Breath sounds: Normal breath sounds.   Abdominal:      General: Bowel sounds are normal.      Palpations: Abdomen is soft.   Skin:     General: Skin is warm and dry.   Neurological:      General: No focal deficit present.      Mental Status: She is alert.   Psychiatric:         Mood and Affect: Mood normal.         Behavior: Behavior normal.               Significant Labs: All pertinent labs within the past 24 hours have been reviewed.    Significant Imaging: I have reviewed all pertinent imaging results/findings within the past 24 hours.      Assessment & Plan  Acute pain of right lower extremity  Sickle cell crisis vs worsening of chronic pain     Right ankle - 'There is an osteochondral defect of the medial talar dome. Ankle mortise is preserved. There are spurs on the calcaneus. There is an os trigonum. No acute fracture, dislocation, or bone destruction seen. '  Right knee - 'There are 2 synovial osteochondromas in the  superior knee joint recess. There is a joint effusion. There is DJD. 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 No fracture, dislocation, or bone destruction seen.'   Right hip - 'No fracture, dislocation, or bone destruction seen. '    Plan -   - Prefers morphine over dilaudid due to severe itching with dilaudid   - Morphine + benadryl PRN - wean as able   - Cont home oral pain meds   - Cont home gabapentin   - Cont folic acid   - Ortho consult - no acute interventions inpatient but will need follow up for tricompartmental OA of right knee.   - IV fluids - held 3/31  - PT - cane and outpt PT     Hypertension  - cont home amlodipine and prazosin   Anxiety and depression  - cont home fluoxetine       History of pulmonary embolism  PE x 2 and supposed to be on anticoagulation indefinitely    Pt used to be on eliquis but changed to lovenox due to too expensive and not covered by insurance. Now pt cannot afford the Lovenox either and has been off this. Discussed with primary hematologist Dr. Stern and ok with starting warfarin. Monitor INR and set up with coumadin clinic on discharge.   4/1 - INR increased to 2.9 since yesterday (2).  Given the velocity of increase will change dose of warfarin to 3 mg.    VTE Risk Mitigation (From admission, onward)           Ordered     warfarin tablet 3 mg  Daily         04/01/25 0805     IP VTE HIGH RISK PATIENT  Once         03/25/25 1348     Place sequential compression device  Until discontinued         03/25/25 1348     Reason for No Pharmacological VTE Prophylaxis  Once        Question:  Reasons:  Answer:  Already adequately anticoagulated on oral Anticoagulants    03/25/25 1348                    Discharge Planning   ALYSE: 4/4/2025     Code Status: Full Code   Medical Readiness for Discharge Date:   Discharge Plan A: Home with family   Discharge Delays: None known at this time                    Oksana Wall MD  Department of  [facility] - Med Surg ([address])

## 2025-04-01 NOTE — PLAN OF CARE
Problem: Adult Inpatient Plan of Care  Goal: Plan of Care Review  Outcome: Progressing  Flowsheets (Taken 4/1/2025 0410)  Plan of Care Reviewed With: patient  Goal: Optimal Comfort and Wellbeing  Outcome: Progressing  Intervention: Monitor Pain and Promote Comfort  Flowsheets (Taken 4/1/2025 0410)  Pain Management Interventions:   around-the-clock dosing utilized   quiet environment facilitated   relaxation techniques promoted   pain management plan reviewed with patient/caregiver  Intervention: Provide Person-Centered Care  Flowsheets (Taken 4/1/2025 0410)  Trust Relationship/Rapport: care explained     Problem: Sickle Cell Disease  Goal: Optimal Pain Control and Function  Outcome: Progressing

## 2025-04-02 PROBLEM — D57.00 SICKLE CELL PAIN CRISIS: Status: ACTIVE | Noted: 2025-01-03

## 2025-04-02 LAB
INR PPP: 3.9 (ref 0.8–1.2)
PROTHROMBIN TIME: 38.5 SECONDS (ref 9–12.5)

## 2025-04-02 PROCEDURE — 63600175 PHARM REV CODE 636 W HCPCS: Performed by: HOSPITALIST

## 2025-04-02 PROCEDURE — 36415 COLL VENOUS BLD VENIPUNCTURE: CPT | Performed by: HOSPITALIST

## 2025-04-02 PROCEDURE — 11000001 HC ACUTE MED/SURG PRIVATE ROOM

## 2025-04-02 PROCEDURE — 25000003 PHARM REV CODE 250: Performed by: HOSPITALIST

## 2025-04-02 PROCEDURE — 25000003 PHARM REV CODE 250: Performed by: STUDENT IN AN ORGANIZED HEALTH CARE EDUCATION/TRAINING PROGRAM

## 2025-04-02 PROCEDURE — 63600175 PHARM REV CODE 636 W HCPCS: Performed by: STUDENT IN AN ORGANIZED HEALTH CARE EDUCATION/TRAINING PROGRAM

## 2025-04-02 PROCEDURE — 85610 PROTHROMBIN TIME: CPT | Performed by: HOSPITALIST

## 2025-04-02 RX ORDER — AMOXICILLIN 250 MG
1 CAPSULE ORAL 2 TIMES DAILY
Status: DISCONTINUED | OUTPATIENT
Start: 2025-04-02 | End: 2025-04-06 | Stop reason: HOSPADM

## 2025-04-02 RX ORDER — SODIUM CHLORIDE, SODIUM LACTATE, POTASSIUM CHLORIDE, CALCIUM CHLORIDE 600; 310; 30; 20 MG/100ML; MG/100ML; MG/100ML; MG/100ML
INJECTION, SOLUTION INTRAVENOUS CONTINUOUS
Status: DISCONTINUED | OUTPATIENT
Start: 2025-04-02 | End: 2025-04-06

## 2025-04-02 RX ADMIN — FLUOXETINE HYDROCHLORIDE 80 MG: 20 CAPSULE ORAL at 09:04

## 2025-04-02 RX ADMIN — DIPHENHYDRAMINE HYDROCHLORIDE 50 MG: 50 INJECTION, SOLUTION INTRAMUSCULAR; INTRAVENOUS at 01:04

## 2025-04-02 RX ADMIN — TIZANIDINE 4 MG: 2 TABLET ORAL at 07:04

## 2025-04-02 RX ADMIN — MORPHINE SULFATE 10 MG: 10 INJECTION, SOLUTION INTRAMUSCULAR; INTRAVENOUS at 12:04

## 2025-04-02 RX ADMIN — ACETAMINOPHEN 1000 MG: 500 TABLET ORAL at 09:04

## 2025-04-02 RX ADMIN — SENNOSIDES AND DOCUSATE SODIUM 1 TABLET: 50; 8.6 TABLET ORAL at 09:04

## 2025-04-02 RX ADMIN — DIPHENHYDRAMINE HYDROCHLORIDE 50 MG: 50 INJECTION, SOLUTION INTRAMUSCULAR; INTRAVENOUS at 07:04

## 2025-04-02 RX ADMIN — MORPHINE SULFATE 10 MG: 10 INJECTION, SOLUTION INTRAMUSCULAR; INTRAVENOUS at 07:04

## 2025-04-02 RX ADMIN — MORPHINE SULFATE 10 MG: 10 INJECTION, SOLUTION INTRAMUSCULAR; INTRAVENOUS at 01:04

## 2025-04-02 RX ADMIN — MORPHINE SULFATE 30 MG: 15 TABLET, FILM COATED, EXTENDED RELEASE ORAL at 09:04

## 2025-04-02 RX ADMIN — PANTOPRAZOLE SODIUM 40 MG: 40 TABLET, DELAYED RELEASE ORAL at 09:04

## 2025-04-02 RX ADMIN — TIZANIDINE 4 MG: 2 TABLET ORAL at 12:04

## 2025-04-02 RX ADMIN — PRAZOSIN HYDROCHLORIDE 1 MG: 1 CAPSULE ORAL at 09:04

## 2025-04-02 RX ADMIN — AMLODIPINE BESYLATE 10 MG: 5 TABLET ORAL at 09:04

## 2025-04-02 RX ADMIN — GABAPENTIN 600 MG: 300 CAPSULE ORAL at 09:04

## 2025-04-02 RX ADMIN — TIZANIDINE 4 MG: 2 TABLET ORAL at 10:04

## 2025-04-02 RX ADMIN — DIPHENHYDRAMINE HYDROCHLORIDE 50 MG: 50 INJECTION, SOLUTION INTRAMUSCULAR; INTRAVENOUS at 10:04

## 2025-04-02 RX ADMIN — SODIUM CHLORIDE, POTASSIUM CHLORIDE, SODIUM LACTATE AND CALCIUM CHLORIDE: 600; 310; 30; 20 INJECTION, SOLUTION INTRAVENOUS at 07:04

## 2025-04-02 RX ADMIN — GABAPENTIN 600 MG: 300 CAPSULE ORAL at 03:04

## 2025-04-02 RX ADMIN — MORPHINE SULFATE 10 MG: 10 INJECTION, SOLUTION INTRAMUSCULAR; INTRAVENOUS at 04:04

## 2025-04-02 RX ADMIN — ACETAMINOPHEN 1000 MG: 500 TABLET ORAL at 01:04

## 2025-04-02 RX ADMIN — POLYETHYLENE GLYCOL 3350 17 G: 17 POWDER, FOR SOLUTION ORAL at 09:04

## 2025-04-02 RX ADMIN — MORPHINE SULFATE 10 MG: 10 INJECTION, SOLUTION INTRAMUSCULAR; INTRAVENOUS at 10:04

## 2025-04-02 RX ADMIN — SODIUM CHLORIDE, POTASSIUM CHLORIDE, SODIUM LACTATE AND CALCIUM CHLORIDE: 600; 310; 30; 20 INJECTION, SOLUTION INTRAVENOUS at 09:04

## 2025-04-02 RX ADMIN — DIPHENHYDRAMINE HYDROCHLORIDE 50 MG: 50 INJECTION, SOLUTION INTRAMUSCULAR; INTRAVENOUS at 12:04

## 2025-04-02 RX ADMIN — ACETAMINOPHEN 1000 MG: 500 TABLET ORAL at 06:04

## 2025-04-02 RX ADMIN — FOLIC ACID 1 MG: 1 TABLET ORAL at 09:04

## 2025-04-02 RX ADMIN — DIPHENHYDRAMINE HYDROCHLORIDE 50 MG: 50 INJECTION, SOLUTION INTRAMUSCULAR; INTRAVENOUS at 04:04

## 2025-04-02 RX ADMIN — MORPHINE SULFATE 30 MG: 15 TABLET, FILM COATED, EXTENDED RELEASE ORAL at 03:04

## 2025-04-02 NOTE — PROGRESS NOTES
[facility] Surg [address] Medicine  Progress Note    Patient Name: Nazanin Malone  MRN: [id number]  Patient Class: IP- Inpatient   Admission Date: 3/25/2025  Length of Stay: 8 days  Attending Physician: Oksana Phillips MD  Primary Care Provider: Kezia, Primary Doctor        Subjective     Principal Problem:Sickle cell pain crisis        HPI:  HPI by Dr. 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 medications. Denies any trauma. Pt denies any other sx such as fever, cough, abd pain or chest pain.     In ED /109. Other vitals stable. Hb 9.7. Retic 1.1. UA unremarkable. Pt given multiple doses of dilaudid, benadryl 1L fluids and zofran. Admitted to hospital medicine for further mngx.     Overview/Hospital Course:  Pt still with severe pain in right lower extremity joints.  Imagine showed tricompartmental osteoarthritis of the right knee.  PT/OT consulted, recommended outpatient PT and ambulate with a cane.  Orthopedic surgery evaluated patient and recommended outpatient follow up.      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 Pt used to be on eliquis but changed to lovenox due to too expensive and not covered by insurance. Now pt cannot afford the Lovenox either and has been off this. Discussed with primary hematologist Dr. Stern and ok with starting warfarin. Monitor INR and set up with coumadin clinic on discharge.     Interval History: Still with severe pain in the RLE and not ready for discharge yet.  IVF off (?arm swelling).    Review of Systems   Constitutional:  Negative for chills and fever.   Respiratory:  Negative for cough and shortness of breath.    Cardiovascular:   Negative for chest pain and palpitations.   Musculoskeletal:  Positive for arthralgias and gait problem.        RLE pain     Objective:     Vital Signs (Most Recent):  Temp: 98.2 °F (36.8 °C) (04/02/25 0721)  Pulse: 76 (04/02/25 0721)  Resp: 14 (04/02/25 0721)  BP: (!) 147/97 (04/02/25 0721)  SpO2: 99 % (04/02/25 0721) Vital Signs (24h Range):  Temp:  [98 °F (36.7 °C)-98.7 °F (37.1 °C)] 98.2 °F (36.8 °C)  Pulse:  [75-91] 76  Resp:  [14-20] 14  SpO2:  [94 %-99 %] 99 %  BP: (117-147)/(73-97) 147/97     Weight: 94.3 kg (208 lb)  Body mass index is 38.04 kg/m².    Intake/Output Summary (Last 24 hours) at 4/2/2025 0911  Last data filed at 4/2/2025 0500  Gross per 24 hour   Intake 240 ml   Output 3000 ml   Net -2760 ml         Physical Exam  Cardiovascular:      Rate and Rhythm: Normal rate and regular rhythm.      Heart sounds: Normal heart sounds. No murmur heard.     No gallop.   Pulmonary:      Effort: Pulmonary effort is normal.      Breath sounds: Normal breath sounds.   Abdominal:      General: Bowel sounds are normal.      Palpations: Abdomen is soft.   Skin:     General: Skin is warm and dry.   Neurological:      General: No focal deficit present.      Mental Status: She is alert.   Psychiatric:         Mood and Affect: Mood normal.         Behavior: Behavior normal.               Significant Labs: All pertinent labs within the past 24 hours have been reviewed.    Significant Imaging: I have reviewed all pertinent imaging results/findings within the past 24 hours.      Assessment & Plan  Sickle cell pain crisis  Patient presented with RLE pain due to sickle cell pain crisis.    Plan -   - Prefers morphine over dilaudid due to severe itching with dilaudid   - Morphine + benadryl PRN   - Cont home oral pain meds   - Cont home gabapentin   - Cont folic acid   - Ortho consulted, follow up outpatient for tricompartmental OA of right knee.   - IV fluids - held 3/31 due to arm swelling - patient notes this is chronic  due to previous DVT.  Resumed 4/2  - PT - cane and outpt PT recommended  - Patient hopes to resume exchange transfusions - was getting them in [place] with good results.    Hypertension  - cont home amlodipine and prazosin   Anxiety and depression  - cont home fluoxetine       History of pulmonary embolism  PE x 2 and supposed to be on anticoagulation indefinitely    Pt used to be on eliquis but changed to lovenox due to too expensive and not covered by insurance. Now pt cannot afford the Lovenox either and has been off this. Discussed with primary hematologist Dr. Stern and ok with starting warfarin. Monitor INR and set up with coumadin clinic on discharge.   4/1 - INR increased to 2.9 since yesterday (2).  Given the velocity of increase will change dose of warfarin to 3 mg.  - 4/2 - INR pending    VTE Risk Mitigation (From admission, onward)           Ordered     warfarin tablet 3 mg  Daily         04/01/25 0805     IP VTE HIGH RISK PATIENT  Once         03/25/25 1348     Place sequential compression device  Until discontinued         03/25/25 1348     Reason for No Pharmacological VTE Prophylaxis  Once        Question:  Reasons:  Answer:  Already adequately anticoagulated on oral Anticoagulants    03/25/25 1348                    Discharge Planning   ALYSE: 4/4/2025     Code Status: Full Code   Medical Readiness for Discharge Date:   Discharge Plan A: Home with family   Discharge Delays: None known at this time                    Oksana Wall MD  Department of Hospital Medicine   [facility] - Med Surg ([address])

## 2025-04-02 NOTE — SUBJECTIVE & OBJECTIVE
Interval History: Still with severe pain in the RLE and not ready for discharge yet.  IVF off (?arm swelling).    Review of Systems   Constitutional:  Negative for chills and fever.   Respiratory:  Negative for cough and shortness of breath.    Cardiovascular:  Negative for chest pain and palpitations.   Musculoskeletal:  Positive for arthralgias and gait problem.        RLE pain     Objective:     Vital Signs (Most Recent):  Temp: 98.2 °F (36.8 °C) (04/02/25 0721)  Pulse: 76 (04/02/25 0721)  Resp: 14 (04/02/25 0721)  BP: (!) 147/97 (04/02/25 0721)  SpO2: 99 % (04/02/25 0721) Vital Signs (24h Range):  Temp:  [98 °F (36.7 °C)-98.7 °F (37.1 °C)] 98.2 °F (36.8 °C)  Pulse:  [75-91] 76  Resp:  [14-20] 14  SpO2:  [94 %-99 %] 99 %  BP: (117-147)/(73-97) 147/97     Weight: 94.3 kg (208 lb)  Body mass index is 38.04 kg/m².    Intake/Output Summary (Last 24 hours) at 4/2/2025 0911  Last data filed at 4/2/2025 0500  Gross per 24 hour   Intake 240 ml   Output 3000 ml   Net -2760 ml         Physical Exam  Cardiovascular:      Rate and Rhythm: Normal rate and regular rhythm.      Heart sounds: Normal heart sounds. No murmur heard.     No gallop.   Pulmonary:      Effort: Pulmonary effort is normal.      Breath sounds: Normal breath sounds.   Abdominal:      General: Bowel sounds are normal.      Palpations: Abdomen is soft.   Skin:     General: Skin is warm and dry.   Neurological:      General: No focal deficit present.      Mental Status: She is alert.   Psychiatric:         Mood and Affect: Mood normal.         Behavior: Behavior normal.               Significant Labs: All pertinent labs within the past 24 hours have been reviewed.    Significant Imaging: I have reviewed all pertinent imaging results/findings within the past 24 hours.

## 2025-04-02 NOTE — PLAN OF CARE
Problem: Adult Inpatient Plan of Care  Goal: Plan of Care Review  4/2/2025 0410 by Lupe Tracey, RN  Outcome: Progressing  Flowsheets (Taken 4/2/2025 0410)  Plan of Care Reviewed With: patient  4/2/2025 0409 by Lupe Tracey, RN  Outcome: Progressing  Flowsheets (Taken 4/2/2025 0409)  Plan of Care Reviewed With: patient     Problem: Pain Acute  Goal: Optimal Pain Control and Function  Outcome: Progressing  Intervention: Develop Pain Management Plan  Flowsheets (Taken 4/2/2025 0410)  Pain Management Interventions:   around-the-clock dosing utilized   quiet environment facilitated   relaxation techniques promoted   care clustered     Problem: Fall Injury Risk  Goal: Absence of Fall and Fall-Related Injury  Outcome: Progressing  Intervention: Promote Injury-Free Environment  Flowsheets (Taken 4/2/2025 0409)  Safety Promotion/Fall Prevention:   nonskid shoes/socks when out of bed   medications reviewed   side rails raised x 2   room near unit station   assistive device/personal item within reach

## 2025-04-02 NOTE — PLAN OF CARE
Problem: Adult Inpatient Plan of Care  Goal: Plan of Care Review  Outcome: Progressing  Goal: Patient-Specific Goal (Individualized)  Outcome: Progressing  Goal: Absence of Hospital-Acquired Illness or Injury  Outcome: Progressing  Goal: Optimal Comfort and Wellbeing  Outcome: Progressing  Goal: Readiness for Transition of Care  Outcome: Progressing     Problem: Acute Kidney Injury/Impairment  Goal: Fluid and Electrolyte Balance  Outcome: Progressing  Goal: Improved Oral Intake  Outcome: Progressing    Problem: Infection  Goal: Absence of Infection Signs and Symptoms  Outcome: Progressing     Problem: Sickle Cell Disease  Goal: Optimal Cerebral Tissue Perfusion  Outcome: Progressing  Goal: Optimal Coping with Sickle Cell Disease  Outcome: Progressing  Goal: Effective Tissue Perfusion  Outcome: Progressing  Goal: Absence of Infection Signs and Symptoms  Outcome: Progressing  Goal: Optimal Pain Control and Function  Outcome: Progressing  Goal: Optimal Oxygenation  Outcome: Progressing    Medicated for pain with PRN orders as available. IV fluids added this shift.

## 2025-04-02 NOTE — ASSESSMENT & PLAN NOTE
Patient presented with RLE pain due to sickle cell pain crisis.    Plan -   - Prefers morphine over dilaudid due to severe itching with dilaudid   - Morphine + benadryl PRN   - Cont home oral pain meds   - Cont home gabapentin   - Cont folic acid   - Ortho consulted, follow up outpatient for tricompartmental OA of right knee.   - IV fluids - held 3/31 due to arm swelling - patient notes this is chronic due to previous DVT.  Resumed 4/2  - PT - cane and outpt PT recommended  - Patient hopes to resume exchange transfusions - was getting them in [place] with good results.

## 2025-04-02 NOTE — ASSESSMENT & PLAN NOTE
PE x 2 and supposed to be on anticoagulation indefinitely    Pt used to be on eliquis but changed to lovenox due to too expensive and not covered by insurance. Now pt cannot afford the Lovenox either and has been off this. Discussed with primary hematologist Dr. Stern and ok with starting warfarin. Monitor INR and set up with coumadin clinic on discharge.   4/1 - INR increased to 2.9 since yesterday (2).  Given the velocity of increase will change dose of warfarin to 3 mg.  - 4/2 - INR pending

## 2025-04-03 LAB
ABSOLUTE EOSINOPHIL (OHS): 0.4 K/UL
ABSOLUTE MONOCYTE (OHS): 0.77 K/UL (ref 0.3–1)
ABSOLUTE NEUTROPHIL COUNT (OHS): 1.84 K/UL (ref 1.8–7.7)
BASOPHILS # BLD AUTO: 0.03 K/UL
BASOPHILS NFR BLD AUTO: 0.5 %
ERYTHROCYTE [DISTWIDTH] IN BLOOD BY AUTOMATED COUNT: 22 % (ref 11.5–14.5)
HCT VFR BLD AUTO: 26.8 % (ref 37–48.5)
HGB BLD-MCNC: 8.6 GM/DL (ref 12–16)
HOLD SPECIMEN: NORMAL
IMM GRANULOCYTES # BLD AUTO: 0.01 K/UL (ref 0–0.04)
IMM GRANULOCYTES NFR BLD AUTO: 0.2 % (ref 0–0.5)
INR PPP: 3.7 (ref 0.8–1.2)
LYMPHOCYTES # BLD AUTO: 2.54 K/UL (ref 1–4.8)
MCH RBC QN AUTO: 22.8 PG (ref 27–31)
MCHC RBC AUTO-ENTMCNC: 32.1 G/DL (ref 32–36)
MCV RBC AUTO: 71 FL (ref 82–98)
NUCLEATED RBC (/100WBC) (OHS): 6 /100 WBC
PLATELET # BLD AUTO: 231 K/UL (ref 150–450)
PMV BLD AUTO: 9.5 FL (ref 9.2–12.9)
PROTHROMBIN TIME: 37.7 SECONDS (ref 9–12.5)
RBC # BLD AUTO: 3.77 M/UL (ref 4–5.4)
RELATIVE EOSINOPHIL (OHS): 7.2 %
RELATIVE LYMPHOCYTE (OHS): 45.4 % (ref 18–48)
RELATIVE MONOCYTE (OHS): 13.8 % (ref 4–15)
RELATIVE NEUTROPHIL (OHS): 32.9 % (ref 38–73)
WBC # BLD AUTO: 5.59 K/UL (ref 3.9–12.7)

## 2025-04-03 PROCEDURE — 25000003 PHARM REV CODE 250: Performed by: STUDENT IN AN ORGANIZED HEALTH CARE EDUCATION/TRAINING PROGRAM

## 2025-04-03 PROCEDURE — 63600175 PHARM REV CODE 636 W HCPCS: Performed by: STUDENT IN AN ORGANIZED HEALTH CARE EDUCATION/TRAINING PROGRAM

## 2025-04-03 PROCEDURE — 36415 COLL VENOUS BLD VENIPUNCTURE: CPT | Performed by: HOSPITALIST

## 2025-04-03 PROCEDURE — 11000001 HC ACUTE MED/SURG PRIVATE ROOM

## 2025-04-03 PROCEDURE — 36415 COLL VENOUS BLD VENIPUNCTURE: CPT | Performed by: NURSE PRACTITIONER

## 2025-04-03 PROCEDURE — 63600175 PHARM REV CODE 636 W HCPCS: Performed by: HOSPITALIST

## 2025-04-03 PROCEDURE — 85610 PROTHROMBIN TIME: CPT | Performed by: HOSPITALIST

## 2025-04-03 PROCEDURE — 85025 COMPLETE CBC W/AUTO DIFF WBC: CPT | Performed by: NURSE PRACTITIONER

## 2025-04-03 PROCEDURE — 25000003 PHARM REV CODE 250: Performed by: HOSPITALIST

## 2025-04-03 RX ORDER — HYDROMORPHONE HYDROCHLORIDE 2 MG/ML
2 INJECTION, SOLUTION INTRAMUSCULAR; INTRAVENOUS; SUBCUTANEOUS
Status: DISCONTINUED | OUTPATIENT
Start: 2025-04-03 | End: 2025-04-06 | Stop reason: HOSPADM

## 2025-04-03 RX ADMIN — HYDROMORPHONE HYDROCHLORIDE 2 MG: 2 INJECTION INTRAMUSCULAR; INTRAVENOUS; SUBCUTANEOUS at 11:04

## 2025-04-03 RX ADMIN — LACTULOSE 20 G: 20 SOLUTION ORAL at 08:04

## 2025-04-03 RX ADMIN — SENNOSIDES AND DOCUSATE SODIUM 1 TABLET: 50; 8.6 TABLET ORAL at 10:04

## 2025-04-03 RX ADMIN — DIPHENHYDRAMINE HYDROCHLORIDE 50 MG: 50 INJECTION, SOLUTION INTRAMUSCULAR; INTRAVENOUS at 08:04

## 2025-04-03 RX ADMIN — TIZANIDINE 4 MG: 2 TABLET ORAL at 01:04

## 2025-04-03 RX ADMIN — ACETAMINOPHEN 1000 MG: 500 TABLET ORAL at 06:04

## 2025-04-03 RX ADMIN — DIPHENHYDRAMINE HYDROCHLORIDE 50 MG: 50 INJECTION, SOLUTION INTRAMUSCULAR; INTRAVENOUS at 04:04

## 2025-04-03 RX ADMIN — PANTOPRAZOLE SODIUM 40 MG: 40 TABLET, DELAYED RELEASE ORAL at 08:04

## 2025-04-03 RX ADMIN — ACETAMINOPHEN 1000 MG: 500 TABLET ORAL at 09:04

## 2025-04-03 RX ADMIN — PRAZOSIN HYDROCHLORIDE 1 MG: 1 CAPSULE ORAL at 08:04

## 2025-04-03 RX ADMIN — PANTOPRAZOLE SODIUM 40 MG: 40 TABLET, DELAYED RELEASE ORAL at 10:04

## 2025-04-03 RX ADMIN — DIPHENHYDRAMINE HYDROCHLORIDE 50 MG: 50 INJECTION, SOLUTION INTRAMUSCULAR; INTRAVENOUS at 07:04

## 2025-04-03 RX ADMIN — DIPHENHYDRAMINE HYDROCHLORIDE 50 MG: 50 INJECTION, SOLUTION INTRAMUSCULAR; INTRAVENOUS at 10:04

## 2025-04-03 RX ADMIN — HYDROMORPHONE HYDROCHLORIDE 2 MG: 2 INJECTION INTRAMUSCULAR; INTRAVENOUS; SUBCUTANEOUS at 08:04

## 2025-04-03 RX ADMIN — HYDROMORPHONE HYDROCHLORIDE 2 MG: 2 INJECTION INTRAMUSCULAR; INTRAVENOUS; SUBCUTANEOUS at 04:04

## 2025-04-03 RX ADMIN — MORPHINE SULFATE 30 MG: 15 TABLET, FILM COATED, EXTENDED RELEASE ORAL at 10:04

## 2025-04-03 RX ADMIN — MORPHINE SULFATE 10 MG: 10 INJECTION, SOLUTION INTRAMUSCULAR; INTRAVENOUS at 04:04

## 2025-04-03 RX ADMIN — DIPHENHYDRAMINE HYDROCHLORIDE 50 MG: 50 INJECTION, SOLUTION INTRAMUSCULAR; INTRAVENOUS at 01:04

## 2025-04-03 RX ADMIN — TIZANIDINE 4 MG: 2 TABLET ORAL at 09:04

## 2025-04-03 RX ADMIN — GABAPENTIN 600 MG: 300 CAPSULE ORAL at 10:04

## 2025-04-03 RX ADMIN — MORPHINE SULFATE 10 MG: 10 INJECTION, SOLUTION INTRAMUSCULAR; INTRAVENOUS at 01:04

## 2025-04-03 RX ADMIN — GABAPENTIN 600 MG: 300 CAPSULE ORAL at 03:04

## 2025-04-03 RX ADMIN — SENNOSIDES AND DOCUSATE SODIUM 1 TABLET: 50; 8.6 TABLET ORAL at 08:04

## 2025-04-03 RX ADMIN — AMLODIPINE BESYLATE 10 MG: 5 TABLET ORAL at 10:04

## 2025-04-03 RX ADMIN — TIZANIDINE 4 MG: 2 TABLET ORAL at 04:04

## 2025-04-03 RX ADMIN — HYDROMORPHONE HYDROCHLORIDE 2 MG: 2 INJECTION INTRAMUSCULAR; INTRAVENOUS; SUBCUTANEOUS at 10:04

## 2025-04-03 RX ADMIN — SODIUM CHLORIDE, POTASSIUM CHLORIDE, SODIUM LACTATE AND CALCIUM CHLORIDE: 600; 310; 30; 20 INJECTION, SOLUTION INTRAVENOUS at 04:04

## 2025-04-03 RX ADMIN — MORPHINE SULFATE 10 MG: 10 INJECTION, SOLUTION INTRAMUSCULAR; INTRAVENOUS at 07:04

## 2025-04-03 RX ADMIN — FOLIC ACID 1 MG: 1 TABLET ORAL at 10:04

## 2025-04-03 RX ADMIN — SODIUM CHLORIDE, POTASSIUM CHLORIDE, SODIUM LACTATE AND CALCIUM CHLORIDE: 600; 310; 30; 20 INJECTION, SOLUTION INTRAVENOUS at 01:04

## 2025-04-03 RX ADMIN — DIPHENHYDRAMINE HYDROCHLORIDE 50 MG: 50 INJECTION, SOLUTION INTRAMUSCULAR; INTRAVENOUS at 11:04

## 2025-04-03 RX ADMIN — HYDROMORPHONE HYDROCHLORIDE 2 MG: 2 INJECTION INTRAMUSCULAR; INTRAVENOUS; SUBCUTANEOUS at 01:04

## 2025-04-03 RX ADMIN — MORPHINE SULFATE 30 MG: 15 TABLET, FILM COATED, EXTENDED RELEASE ORAL at 08:04

## 2025-04-03 RX ADMIN — ACETAMINOPHEN 1000 MG: 500 TABLET ORAL at 01:04

## 2025-04-03 RX ADMIN — LACTULOSE 20 G: 20 SOLUTION ORAL at 03:04

## 2025-04-03 RX ADMIN — FLUOXETINE HYDROCHLORIDE 80 MG: 20 CAPSULE ORAL at 10:04

## 2025-04-03 RX ADMIN — GABAPENTIN 600 MG: 300 CAPSULE ORAL at 08:04

## 2025-04-03 RX ADMIN — MORPHINE SULFATE 30 MG: 15 TABLET, FILM COATED, EXTENDED RELEASE ORAL at 03:04

## 2025-04-03 NOTE — PROGRESS NOTES
[facility] Surg [address] Medicine  Progress Note    Patient Name: Nazanin Malone  MRN: [id number]  Patient Class: IP- Inpatient   Admission Date: 3/25/2025  Length of Stay: 9 days  Attending Physician: Oksana Phillips MD  Primary Care Provider: Kezia, Primary Doctor        Subjective     Principal Problem:Sickle cell pain crisis        HPI:  HPI by Dr. 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 medications. Denies any trauma. Pt denies any other sx such as fever, cough, abd pain or chest pain.     In ED /109. Other vitals stable. Hb 9.7. Retic 1.1. UA unremarkable. Pt given multiple doses of dilaudid, benadryl 1L fluids and zofran. Admitted to hospital medicine for further mngx.     Overview/Hospital Course:  Pt still with severe pain in right lower extremity joints.  Imagine showed tricompartmental osteoarthritis of the right knee.  PT/OT consulted, recommended outpatient PT and ambulate with a cane.  Orthopedic surgery evaluated patient and recommended outpatient follow up.      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 Pt used to be on eliquis but changed to lovenox due to too expensive and not covered by insurance. Now pt cannot afford the Lovenox either and has been off this. Discussed with primary hematologist Dr. Stern and ok with starting warfarin. Monitor INR and set up with coumadin clinic on discharge.     Interval History: She still has a lot of pain in the right knee/ankle.  Still is not able to manage without IV medication.    Review of Systems   Constitutional:  Negative for chills and fever.   Respiratory:  Negative for cough and shortness of breath.     Cardiovascular:  Negative for chest pain and palpitations.   Musculoskeletal:  Positive for arthralgias and gait problem.        RLE pain     Objective:     Vital Signs (Most Recent):  Temp: 97.8 °F (36.6 °C) (04/03/25 1641)  Pulse: 73 (04/03/25 1641)  Resp: 18 (Simultaneous filing. User may not have seen previous data.) (04/03/25 1641)  BP: 134/84 (04/03/25 1641)  SpO2: 99 % (04/03/25 1641) Vital Signs (24h Range):  Temp:  [97.7 °F (36.5 °C)-98.6 °F (37 °C)] 97.8 °F (36.6 °C)  Pulse:  [73-85] 73  Resp:  [14-20] 18  SpO2:  [94 %-99 %] 99 %  BP: (117-155)/(82-96) 134/84     Weight: 94.3 kg (207 lb 14.3 oz)  Body mass index is 38.02 kg/m².    Intake/Output Summary (Last 24 hours) at 4/3/2025 1838  Last data filed at 4/3/2025 1200  Gross per 24 hour   Intake --   Output 1180 ml   Net -1180 ml         Physical Exam  Cardiovascular:      Rate and Rhythm: Normal rate and regular rhythm.      Heart sounds: Normal heart sounds. No murmur heard.     No gallop.   Pulmonary:      Effort: Pulmonary effort is normal.      Breath sounds: Normal breath sounds.   Abdominal:      General: Bowel sounds are normal.      Palpations: Abdomen is soft.   Skin:     General: Skin is warm and dry.   Neurological:      General: No focal deficit present.      Mental Status: She is alert.   Psychiatric:         Mood and Affect: Mood normal.         Behavior: Behavior normal.               Significant Labs: All pertinent labs within the past 24 hours have been reviewed.    Significant Imaging: I have reviewed all pertinent imaging results/findings within the past 24 hours.      Assessment & Plan  Sickle cell pain crisis  Patient presented with RLE pain due to sickle cell pain crisis.    Plan -   - Prefers morphine over dilaudid due to severe itching with dilaudid   - Morphine + benadryl PRN   - Cont home oral pain meds   - Cont home gabapentin   - Cont folic acid   - Ortho consulted, follow up outpatient for tricompartmental OA of right knee.    - IV fluids - held 3/31 due to arm swelling - patient notes this is chronic due to previous DVT.  Resumed 4/2  - PT - cane and outpt PT recommended  - Patient hopes to resume exchange transfusions - was getting them in [place] with good results.    Hypertension  - cont home amlodipine and prazosin   Anxiety and depression  - cont home fluoxetine       History of pulmonary embolism  PE x 2 and supposed to be on anticoagulation indefinitely    Pt used to be on eliquis but changed to lovenox due to too expensive and not covered by insurance. Now pt cannot afford the Lovenox either and has been off this. Discussed with primary hematologist Dr. Stern and ok with starting warfarin. Monitor INR and set up with coumadin clinic on discharge.   4/1 - INR increased to 2.9 since yesterday (2).  Given the velocity of increase will change dose of warfarin to 3 mg.  - 4/2 - Warfarin held for INR 3.9  - 4/3 - Warfarin held for INR 3.7. Both Xarelto and apixaban are on her formulary at a level 5.  Will ask pharmacy to investigate.  She is very warfarin sensitive.    VTE Risk Mitigation (From admission, onward)           Ordered     IP VTE HIGH RISK PATIENT  Once         03/25/25 1348     Place sequential compression device  Until discontinued         03/25/25 1348     Reason for No Pharmacological VTE Prophylaxis  Once        Question:  Reasons:  Answer:  Already adequately anticoagulated on oral Anticoagulants    03/25/25 1348                    Discharge Planning   ALYSE: 4/4/2025     Code Status: Full Code   Medical Readiness for Discharge Date:   Discharge Plan A: Home with family   Discharge Delays: None known at this time                    Oksana Wall MD  Department of Hospital Medicine   [facility] - Med Surg ([address])

## 2025-04-03 NOTE — ASSESSMENT & PLAN NOTE
PE x 2 and supposed to be on anticoagulation indefinitely    Pt used to be on eliquis but changed to lovenox due to too expensive and not covered by insurance. Now pt cannot afford the Lovenox either and has been off this. Discussed with primary hematologist Dr. Stern and ok with starting warfarin. Monitor INR and set up with coumadin clinic on discharge.   4/1 - INR increased to 2.9 since yesterday (2).  Given the velocity of increase will change dose of warfarin to 3 mg.  - 4/2 - Warfarin held for INR 3.9  - 4/3 - Warfarin held for INR 3.7. Both Xarelto and apixaban are on her formulary at a level 5.  Will ask pharmacy to investigate.  She is very warfarin sensitive.

## 2025-04-03 NOTE — SUBJECTIVE & OBJECTIVE
Interval History: She still has a lot of pain in the right knee/ankle.  Still is not able to manage without IV medication.    Review of Systems   Constitutional:  Negative for chills and fever.   Respiratory:  Negative for cough and shortness of breath.    Cardiovascular:  Negative for chest pain and palpitations.   Musculoskeletal:  Positive for arthralgias and gait problem.        RLE pain     Objective:     Vital Signs (Most Recent):  Temp: 97.8 °F (36.6 °C) (04/03/25 1641)  Pulse: 73 (04/03/25 1641)  Resp: 18 (Simultaneous filing. User may not have seen previous data.) (04/03/25 1641)  BP: 134/84 (04/03/25 1641)  SpO2: 99 % (04/03/25 1641) Vital Signs (24h Range):  Temp:  [97.7 °F (36.5 °C)-98.6 °F (37 °C)] 97.8 °F (36.6 °C)  Pulse:  [73-85] 73  Resp:  [14-20] 18  SpO2:  [94 %-99 %] 99 %  BP: (117-155)/(82-96) 134/84     Weight: 94.3 kg (207 lb 14.3 oz)  Body mass index is 38.02 kg/m².    Intake/Output Summary (Last 24 hours) at 4/3/2025 1838  Last data filed at 4/3/2025 1200  Gross per 24 hour   Intake --   Output 1180 ml   Net -1180 ml         Physical Exam  Cardiovascular:      Rate and Rhythm: Normal rate and regular rhythm.      Heart sounds: Normal heart sounds. No murmur heard.     No gallop.   Pulmonary:      Effort: Pulmonary effort is normal.      Breath sounds: Normal breath sounds.   Abdominal:      General: Bowel sounds are normal.      Palpations: Abdomen is soft.   Skin:     General: Skin is warm and dry.   Neurological:      General: No focal deficit present.      Mental Status: She is alert.   Psychiatric:         Mood and Affect: Mood normal.         Behavior: Behavior normal.               Significant Labs: All pertinent labs within the past 24 hours have been reviewed.    Significant Imaging: I have reviewed all pertinent imaging results/findings within the past 24 hours.

## 2025-04-03 NOTE — PLAN OF CARE
Problem: Pain Acute  Goal: Optimal Pain Control and Function  Outcome: Progressing  Intervention: Develop Pain Management Plan  Flowsheets (Taken 4/3/2025 1755)  Pain Management Interventions:   care clustered   pain management plan reviewed with patient/caregiver   medication offered   quiet environment facilitated   relaxation techniques promoted     Problem: Fall Injury Risk  Goal: Absence of Fall and Fall-Related Injury  Outcome: Progressing  Intervention: Identify and Manage Contributors  Flowsheets (Taken 4/3/2025 1755)  Self-Care Promotion:   independence encouraged   BADL personal objects within reach   BADL personal routines maintained  Medication Review/Management:   medications reviewed   high-risk medications identified

## 2025-04-03 NOTE — PLAN OF CARE
Recommendations  1) Continue regular diet   2) Encourage intake of meals   3) RD to monitor and follow up     Goals: Encourage patient to eat 50-75% of meals daily.     Nutrition Goal Status: new     Communication of RD Recs: other (comment) (POC)

## 2025-04-03 NOTE — PROGRESS NOTES
[facility] - [facility] ([address])  Adult Nutrition  Progress Note    SUMMARY       Recommendations  1) Continue regular diet   2) Encourage intake of meals   3) RD to monitor and follow up    Goals: Encourage patient to eat 50-75% of meals daily.    Nutrition Goal Status: new    Communication of RD Recs: other (comment) (POC)    Nutrition Discharge Planning    Nutrition Discharge Planning: General healthy diet    Assessment and Plan    Nutrition Problem  Increased nutrient needs    Related to (etiology):   Sickle Cell Pain Crisis    Signs and Symptoms (as evidenced by):   Stomach pain, constipation     Interventions/Recommendations (treatment strategy):  - General Healthful Diet  - Collaboration with other providers     Nutrition Diagnosis Status:   New     Malnutrition Assessment                     Restorationism Region (Muscle Loss): well nourished  Clavicle Bone Region (Muscle Loss): well nourished  Clavicle and Acromion Bone Region (Muscle Loss): well nourished  Scapular Bone Region (Muscle Loss): well nourished  Dorsal Hand (Muscle Loss): well nourished  Patellar Region (Muscle Loss): well nourished  Anterior Thigh Region (Muscle Loss): well nourished  Posterior Calf Region (Muscle Loss): well nourished                 Reason for Assessment    Reason For Assessment: length of stay (9 days)  Diagnosis: other (see comments) (sickle cell pain crisis)  General Information Comments: Patient is diagnosed with sickle cell pain crisis. No nausea, vomiting, or diarrhea. Typically eats once a day at home. Patient reports eating 100% of breakfast today. Does not always have 100% appetite in the hospital due to pain and constipation. Offered a Boost, but patient refused politely. In the last year, patient reported 120 lbs weight loss. Sven Score: 22. Port A Cath Single Lumen, Right Atrial. Patient is nourished.    Nutrition/Diet History    Nutrition Intake History: Regular  Spiritual, Cultural Beliefs, [employer] Practices,  "Values that Affect Care: no  Factors Affecting Nutritional Intake: abdominal pain, constipation, pain    Anthropometrics    Height: 5' 2" (157.5 cm)  Height (inches): 62 in  Weight: 94.3 kg (207 lb 14.3 oz)  Weight (lb): 207.9 lb  Weight Method: Bed Scale  Ideal Body Weight (IBW), Female: 110 lb  % Ideal Body Weight, Female (lb): 189 %  BMI (Calculated): 38  BMI Grade: 35 - 39.9 - obesity - grade II       Lab/Procedures/Meds    Pertinent Labs Reviewed: reviewed (No new labs)  Pertinent Medications Reviewed: reviewed  Pertinent Medications Comments: Acetaminophen, amlodipine, dextrose, folic acid, gabapentin, glucagon, glucose chewable, hydralazine, hydrocodone acetaminophen, lactated ringers, lactulose, pantoprazole, prazosin, senna docusate, sodium chloride    Estimated/Assessed Needs    Weight Used For Calorie Calculations: 94.3 kg (207 lb 14.3 oz)  Energy Calorie Requirements (kcal): 1997 kcal  Energy Need Method: [place]-St Jeor (x1.3)  Protein Requirements: 76 g (0.8 g/kg)  Weight Used For Protein Calculations: 94.3 kg (207 lb 14.3 oz)  Fluid Requirements (mL): 1 mL/kcal  Estimated Fluid Requirement Method: RDA Method  RDA Method (mL): 1997         Nutrition Prescription Ordered    Current Diet Order: Regular    Evaluation of Received Nutrient/Fluid Intake    I/O: -2.7 L since admit  Energy Calories Required: meeting needs  Protein Required: meeting needs  Fluid Required: meeting needs  Comments: LBM: 3/29/25  Tolerance: tolerating  % Intake of Estimated Energy Needs: 50 - 75 %  % Meal Intake: 50 - 75 %    Nutrition Risk    Level of Risk/Frequency of Follow-up: low - moderate     Monitor and Evaluation    Monitor and Evaluation: Energy intake, Protein intake, Weight, Nutrition focused physical findings     Nutrition Follow-Up    RD Follow-up?: Yes    Marilyn Johnson, Student Dietitian      "

## 2025-04-03 NOTE — PLAN OF CARE
04/03/25 1445   Discharge Reassessment   Assessment Type Discharge Planning Reassessment   Did the patient's condition or plan change since previous assessment? No   Discharge Plan discussed with: Patient   Communicated ALYSE with patient/caregiver Date not available/Unable to determine   Discharge Plan A Home with family   Discharge Plan B Home with family   DME Needed Upon Discharge  none   Transition of Care Barriers None   Why the patient remains in the hospital Requires continued medical care   Post-Acute Status   Discharge Delays None known at this time     Patient still with pain. SW to continue to follow for discharge needs.

## 2025-04-04 ENCOUNTER — TELEPHONE (OUTPATIENT)
Dept: HEMATOLOGY/ONCOLOGY | Facility: CLINIC | Age: 45
End: 2025-04-04
Payer: MEDICARE

## 2025-04-04 LAB
CK SERPL-CCNC: 89 U/L (ref 20–180)
INR PPP: 2.6 (ref 0.8–1.2)
PROTHROMBIN TIME: 27 SECONDS (ref 9–12.5)
TROPONIN I SERPL DL<=0.01 NG/ML-MCNC: <0.006 NG/ML

## 2025-04-04 PROCEDURE — 25000003 PHARM REV CODE 250: Performed by: STUDENT IN AN ORGANIZED HEALTH CARE EDUCATION/TRAINING PROGRAM

## 2025-04-04 PROCEDURE — 36415 COLL VENOUS BLD VENIPUNCTURE: CPT | Performed by: HOSPITALIST

## 2025-04-04 PROCEDURE — 99900035 HC TECH TIME PER 15 MIN (STAT)

## 2025-04-04 PROCEDURE — 11000001 HC ACUTE MED/SURG PRIVATE ROOM

## 2025-04-04 PROCEDURE — 82550 ASSAY OF CK (CPK): CPT | Performed by: HOSPITALIST

## 2025-04-04 PROCEDURE — 25000003 PHARM REV CODE 250: Performed by: HOSPITALIST

## 2025-04-04 PROCEDURE — 93005 ELECTROCARDIOGRAM TRACING: CPT

## 2025-04-04 PROCEDURE — 63600175 PHARM REV CODE 636 W HCPCS: Performed by: HOSPITALIST

## 2025-04-04 PROCEDURE — 85610 PROTHROMBIN TIME: CPT | Performed by: HOSPITALIST

## 2025-04-04 PROCEDURE — 93010 ELECTROCARDIOGRAM REPORT: CPT | Mod: ,,, | Performed by: INTERNAL MEDICINE

## 2025-04-04 PROCEDURE — 94761 N-INVAS EAR/PLS OXIMETRY MLT: CPT

## 2025-04-04 PROCEDURE — 84484 ASSAY OF TROPONIN QUANT: CPT | Performed by: HOSPITALIST

## 2025-04-04 RX ORDER — WARFARIN 2.5 MG/1
2.5 TABLET ORAL DAILY
Qty: 30 TABLET | Refills: 0 | Status: ON HOLD | OUTPATIENT
Start: 2025-04-04

## 2025-04-04 RX ORDER — RIVAROXABAN 10 MG/1
10 TABLET, FILM COATED ORAL 2 TIMES DAILY
Qty: 90 TABLET | Refills: 0 | Status: SHIPPED | OUTPATIENT
Start: 2025-04-04 | End: 2025-04-04 | Stop reason: HOSPADM

## 2025-04-04 RX ORDER — WARFARIN 2.5 MG/1
2.5 TABLET ORAL DAILY
Status: DISCONTINUED | OUTPATIENT
Start: 2025-04-04 | End: 2025-04-06 | Stop reason: HOSPADM

## 2025-04-04 RX ADMIN — MORPHINE SULFATE 30 MG: 15 TABLET, FILM COATED, EXTENDED RELEASE ORAL at 10:04

## 2025-04-04 RX ADMIN — TIZANIDINE 4 MG: 2 TABLET ORAL at 08:04

## 2025-04-04 RX ADMIN — DIPHENHYDRAMINE HYDROCHLORIDE 50 MG: 50 INJECTION, SOLUTION INTRAMUSCULAR; INTRAVENOUS at 11:04

## 2025-04-04 RX ADMIN — DIPHENHYDRAMINE HYDROCHLORIDE 50 MG: 50 INJECTION, SOLUTION INTRAMUSCULAR; INTRAVENOUS at 08:04

## 2025-04-04 RX ADMIN — HYDROMORPHONE HYDROCHLORIDE 2 MG: 2 INJECTION INTRAMUSCULAR; INTRAVENOUS; SUBCUTANEOUS at 05:04

## 2025-04-04 RX ADMIN — PANTOPRAZOLE SODIUM 40 MG: 40 TABLET, DELAYED RELEASE ORAL at 08:04

## 2025-04-04 RX ADMIN — LACTULOSE 20 G: 20 SOLUTION ORAL at 02:04

## 2025-04-04 RX ADMIN — DIPHENHYDRAMINE HYDROCHLORIDE 50 MG: 50 INJECTION, SOLUTION INTRAMUSCULAR; INTRAVENOUS at 04:04

## 2025-04-04 RX ADMIN — PRAZOSIN HYDROCHLORIDE 1 MG: 1 CAPSULE ORAL at 08:04

## 2025-04-04 RX ADMIN — TIZANIDINE 4 MG: 2 TABLET ORAL at 04:04

## 2025-04-04 RX ADMIN — SENNOSIDES AND DOCUSATE SODIUM 1 TABLET: 50; 8.6 TABLET ORAL at 08:04

## 2025-04-04 RX ADMIN — HYDROMORPHONE HYDROCHLORIDE 2 MG: 2 INJECTION INTRAMUSCULAR; INTRAVENOUS; SUBCUTANEOUS at 02:04

## 2025-04-04 RX ADMIN — GABAPENTIN 600 MG: 300 CAPSULE ORAL at 08:04

## 2025-04-04 RX ADMIN — TIZANIDINE 4 MG: 2 TABLET ORAL at 11:04

## 2025-04-04 RX ADMIN — DIPHENHYDRAMINE HYDROCHLORIDE 50 MG: 50 INJECTION, SOLUTION INTRAMUSCULAR; INTRAVENOUS at 10:04

## 2025-04-04 RX ADMIN — HYDROMORPHONE HYDROCHLORIDE 2 MG: 2 INJECTION INTRAMUSCULAR; INTRAVENOUS; SUBCUTANEOUS at 08:04

## 2025-04-04 RX ADMIN — DIPHENHYDRAMINE HYDROCHLORIDE 50 MG: 50 INJECTION, SOLUTION INTRAMUSCULAR; INTRAVENOUS at 02:04

## 2025-04-04 RX ADMIN — MORPHINE SULFATE 30 MG: 15 TABLET, FILM COATED, EXTENDED RELEASE ORAL at 08:04

## 2025-04-04 RX ADMIN — DIPHENHYDRAMINE HYDROCHLORIDE 50 MG: 50 INJECTION, SOLUTION INTRAMUSCULAR; INTRAVENOUS at 05:04

## 2025-04-04 RX ADMIN — FLUOXETINE HYDROCHLORIDE 80 MG: 20 CAPSULE ORAL at 08:04

## 2025-04-04 RX ADMIN — LACTULOSE 20 G: 20 SOLUTION ORAL at 08:04

## 2025-04-04 RX ADMIN — GABAPENTIN 600 MG: 300 CAPSULE ORAL at 02:04

## 2025-04-04 RX ADMIN — ACETAMINOPHEN 1000 MG: 500 TABLET ORAL at 09:04

## 2025-04-04 RX ADMIN — FOLIC ACID 1 MG: 1 TABLET ORAL at 08:04

## 2025-04-04 RX ADMIN — AMLODIPINE BESYLATE 10 MG: 5 TABLET ORAL at 08:04

## 2025-04-04 RX ADMIN — ACETAMINOPHEN 1000 MG: 500 TABLET ORAL at 05:04

## 2025-04-04 RX ADMIN — ACETAMINOPHEN 1000 MG: 500 TABLET ORAL at 02:04

## 2025-04-04 RX ADMIN — HYDROMORPHONE HYDROCHLORIDE 2 MG: 2 INJECTION INTRAMUSCULAR; INTRAVENOUS; SUBCUTANEOUS at 11:04

## 2025-04-04 RX ADMIN — WARFARIN SODIUM 2.5 MG: 2.5 TABLET ORAL at 05:04

## 2025-04-04 RX ADMIN — HYDROMORPHONE HYDROCHLORIDE 2 MG: 2 INJECTION INTRAMUSCULAR; INTRAVENOUS; SUBCUTANEOUS at 04:04

## 2025-04-04 NOTE — PLAN OF CARE
DARRION spoke to Sarita in the coumadin clinic and a nurse will call patient when she is discharged to schedule appointment.       DARRION sent message to Dr. Soni's nurse to get patient scheduled.

## 2025-04-04 NOTE — TELEPHONE ENCOUNTER
----- Message from  Jose sent at 4/4/2025  9:26 AM CDT -----  Regarding: Appt request  Good morning, Patient will discharge today from [facility], she is a patient of Dr. Clark, however, I was told all sickle cell patients are seeing Dr. Soni now. Can we get her an appointment with Dr. Soni and call patient with appointment date and time. [phone] you!

## 2025-04-04 NOTE — PROGRESS NOTES
[facility] [address] Medicine  Progress Note    Patient Name: Nazanin Malone  MRN: [id number]  Patient Class: IP- Inpatient   Admission Date: 3/25/2025  Length of Stay: 10 days  Attending Physician: Oksana Phillips MD  Primary Care Provider: Kezia, Primary Doctor        Subjective     Principal Problem:Sickle cell pain crisis        HPI:  HPI by Dr. 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 medications. Denies any trauma. Pt denies any other sx such as fever, cough, abd pain or chest pain.     In ED /109. Other vitals stable. Hb 9.7. Retic 1.1. UA unremarkable. Pt given multiple doses of dilaudid, benadryl 1L fluids and zofran. Admitted to hospital medicine for further mngx.     Overview/Hospital Course:  Patient admitted with HbSS crisis complicated by RLE joint pain.  Treated with IVF, pain control. Imaging was done and showed tricompartmental osteoarthritis of the right knee.  PT/OT consulted, recommended outpatient PT and ambulate with a cane.  Orthopedic surgery evaluated patient and recommended outpatient follow up.      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 Pt used to be on eliquis but changed to lovenox due to too expensive and not covered by insurance. Now pt cannot afford the Lovenox either and has been off this. Discussed with primary hematologist Dr. Stern and ok with starting warfarin. Monitor INR and set up with coumadin clinic on discharge.  Patient's INR went up rapidly and dose of warfarin decreased and then had to be held for a couple of days.  Resumed 4/4 at 2.5 mg daily.    Patient complained of LUE pain and shooting pains in her chest.  EKG  showed NSR and mildly prolonged QTc (486).  Troponin, CPK and chest x-ray ordered as well as ultrasound of the LUE as she felt the pain was similar to when she had the LUE blood clot.    Interval History: She was hoping to go home this afternoon but later in the day had worsening of shooting pain in her left chest with radiation down the arm and into the axilla.  Feels like when she had the DVT of the arm.    Review of Systems   Constitutional:  Negative for chills and fever.   Respiratory:  Negative for cough and shortness of breath.    Cardiovascular:  Negative for chest pain and palpitations.        Chest wall pain radiating to axilla and left arm.   Musculoskeletal:  Positive for arthralgias and gait problem.        RLE pain     Objective:     Vital Signs (Most Recent):  Temp: 99.1 °F (37.3 °C) (04/04/25 1634)  Pulse: 83 (04/04/25 1634)  Resp: 18 (04/04/25 1712)  BP: (!) 174/77 (04/04/25 1634)  SpO2: 100 % (04/04/25 1634) Vital Signs (24h Range):  Temp:  [97.8 °F (36.6 °C)-99.3 °F (37.4 °C)] 99.1 °F (37.3 °C)  Pulse:  [69-91] 83  Resp:  [16-18] 18  SpO2:  [89 %-100 %] 100 %  BP: (124-174)/(77-84) 174/77     Weight: 94.3 kg (207 lb 14.3 oz)  Body mass index is 38.02 kg/m².    Intake/Output Summary (Last 24 hours) at 4/4/2025 1726  Last data filed at 4/4/2025 0637  Gross per 24 hour   Intake 1000 ml   Output --   Net 1000 ml         Physical Exam  Cardiovascular:      Rate and Rhythm: Normal rate and regular rhythm.      Heart sounds: Normal heart sounds. No murmur heard.     No gallop.   Pulmonary:      Effort: Pulmonary effort is normal.      Breath sounds: Normal breath sounds.   Abdominal:      General: Bowel sounds are normal.      Palpations: Abdomen is soft.   Musculoskeletal:      Comments: Feels pain in chest is deep, not really reproducible although pleuritic   Skin:     General: Skin is warm and dry.   Neurological:      General: No focal deficit present.      Mental Status: She is alert.   Psychiatric:          Behavior: Behavior normal.      Comments: Anxious               Significant Labs: All pertinent labs within the past 24 hours have been reviewed.    Significant Imaging: I have reviewed all pertinent imaging results/findings within the past 24 hours.      Assessment & Plan  Sickle cell pain crisis  Patient presented with RLE pain due to sickle cell pain crisis.    Plan -   - Prefers morphine over dilaudid due to severe itching with dilaudid   - Morphine + benadryl PRN   - Cont home oral pain meds   - Cont home gabapentin   - Cont folic acid   - Ortho consulted, follow up outpatient for tricompartmental OA of right knee.   - IV fluids - held 3/31 due to arm swelling - patient notes this is chronic due to previous DVT.  Resumed 4/2  - PT - cane and outpt PT recommended  - Patient hopes to resume exchange transfusions - was getting them in [place] with good results.  - 4/4 - has increasing shooting chest wall pains on the left, axillary and arm pain.  Mildly tender on palpation but says pain feels deeper.  Also pruritic.  Pain atypical for CAD.  Ordered EKG (NSR), ultrasound, troponin/CPK, chest x-ray.  Await results.  Not ready for discharge yet.    Hypertension  - cont home amlodipine and prazosin   Anxiety and depression  - cont home fluoxetine       History of pulmonary embolism  PE x 2 and supposed to be on anticoagulation indefinitely    Pt used to be on eliquis but changed to lovenox due to too expensive and not covered by insurance. Now pt cannot afford the Lovenox either and has been off this. Discussed with primary hematologist Dr. Stern and ok with starting warfarin. Monitor INR and set up with coumadin clinic on discharge.   4/1 - INR increased to 2.9 since yesterday (2).  Given the velocity of increase will change dose of warfarin to 3 mg.  - 4/2 - Warfarin held for INR 3.9  - 4/3 - Warfarin held for INR 3.7. Both Xarelto and apixaban are on her formulary at a level 5.  Will ask pharmacy to  investigate.  She is very warfarin sensitive.    VTE Risk Mitigation (From admission, onward)           Ordered     warfarin (COUMADIN) tablet 2.5 mg  Daily         04/04/25 0845     IP VTE HIGH RISK PATIENT  Once         03/25/25 1348     Place sequential compression device  Until discontinued         03/25/25 1348     Reason for No Pharmacological VTE Prophylaxis  Once        Question:  Reasons:  Answer:  Already adequately anticoagulated on oral Anticoagulants    03/25/25 1348                    Discharge Planning   ALYSE: 4/8/2025     Code Status: Full Code   Medical Readiness for Discharge Date:   Discharge Plan A: Home with family   Discharge Delays: None known at this time                    Oksana Wall MD  Department of Hospital Medicine   [facility] ([address])

## 2025-04-04 NOTE — SUBJECTIVE & OBJECTIVE
Interval History: She was hoping to go home this afternoon but later in the day had worsening of shooting pain in her left chest with radiation down the arm and into the axilla.  Feels like when she had the DVT of the arm.    Review of Systems   Constitutional:  Negative for chills and fever.   Respiratory:  Negative for cough and shortness of breath.    Cardiovascular:  Negative for chest pain and palpitations.        Chest wall pain radiating to axilla and left arm.   Musculoskeletal:  Positive for arthralgias and gait problem.        RLE pain     Objective:     Vital Signs (Most Recent):  Temp: 99.1 °F (37.3 °C) (04/04/25 1634)  Pulse: 83 (04/04/25 1634)  Resp: 18 (04/04/25 1712)  BP: (!) 174/77 (04/04/25 1634)  SpO2: 100 % (04/04/25 1634) Vital Signs (24h Range):  Temp:  [97.8 °F (36.6 °C)-99.3 °F (37.4 °C)] 99.1 °F (37.3 °C)  Pulse:  [69-91] 83  Resp:  [16-18] 18  SpO2:  [89 %-100 %] 100 %  BP: (124-174)/(77-84) 174/77     Weight: 94.3 kg (207 lb 14.3 oz)  Body mass index is 38.02 kg/m².    Intake/Output Summary (Last 24 hours) at 4/4/2025 1726  Last data filed at 4/4/2025 0637  Gross per 24 hour   Intake 1000 ml   Output --   Net 1000 ml         Physical Exam  Cardiovascular:      Rate and Rhythm: Normal rate and regular rhythm.      Heart sounds: Normal heart sounds. No murmur heard.     No gallop.   Pulmonary:      Effort: Pulmonary effort is normal.      Breath sounds: Normal breath sounds.   Abdominal:      General: Bowel sounds are normal.      Palpations: Abdomen is soft.   Musculoskeletal:      Comments: Feels pain in chest is deep, not really reproducible although pleuritic   Skin:     General: Skin is warm and dry.   Neurological:      General: No focal deficit present.      Mental Status: She is alert.   Psychiatric:         Behavior: Behavior normal.      Comments: Anxious               Significant Labs: All pertinent labs within the past 24 hours have been reviewed.    Significant Imaging: I have  reviewed all pertinent imaging results/findings within the past 24 hours.

## 2025-04-04 NOTE — PLAN OF CARE
Problem: Adult Inpatient Plan of Care  Goal: Patient-Specific Goal (Individualized)  Outcome: Progressing     Problem: Adult Inpatient Plan of Care  Goal: Optimal Comfort and Wellbeing  Outcome: Progressing     Problem: Adult Inpatient Plan of Care  Goal: Readiness for Transition of Care  Outcome: Progressing     Problem: Acute Kidney Injury/Impairment  Goal: Improved Oral Intake  Outcome: Progressing     Problem: Infection  Goal: Absence of Infection Signs and Symptoms  Outcome: Progressing     Problem: Sickle Cell Disease  Goal: Optimal Cerebral Tissue Perfusion  Outcome: Progressing

## 2025-04-04 NOTE — ASSESSMENT & PLAN NOTE
Patient presented with RLE pain due to sickle cell pain crisis.    Plan -   - Prefers morphine over dilaudid due to severe itching with dilaudid   - Morphine + benadryl PRN   - Cont home oral pain meds   - Cont home gabapentin   - Cont folic acid   - Ortho consulted, follow up outpatient for tricompartmental OA of right knee.   - IV fluids - held 3/31 due to arm swelling - patient notes this is chronic due to previous DVT.  Resumed 4/2  - PT - cane and outpt PT recommended  - Patient hopes to resume exchange transfusions - was getting them in [place] with good results.  - 4/4 - has increasing shooting chest wall pains on the left, axillary and arm pain.  Mildly tender on palpation but says pain feels deeper.  Also pruritic.  Pain atypical for CAD.  Ordered EKG (NSR), ultrasound, troponin/CPK, chest x-ray.  Await results.  Not ready for discharge yet.

## 2025-04-05 PROBLEM — I82.C11 ACUTE INTERNAL JUGULAR VEIN THROMBOSIS, RIGHT: Status: ACTIVE | Noted: 2025-04-05

## 2025-04-05 PROBLEM — I82.C22: Status: ACTIVE | Noted: 2025-04-05

## 2025-04-05 LAB
INR PPP: 2.3 (ref 0.8–1.2)
PROTHROMBIN TIME: 24.7 SECONDS (ref 9–12.5)

## 2025-04-05 PROCEDURE — 63600175 PHARM REV CODE 636 W HCPCS: Performed by: HOSPITALIST

## 2025-04-05 PROCEDURE — 25000003 PHARM REV CODE 250: Performed by: STUDENT IN AN ORGANIZED HEALTH CARE EDUCATION/TRAINING PROGRAM

## 2025-04-05 PROCEDURE — 36415 COLL VENOUS BLD VENIPUNCTURE: CPT | Performed by: HOSPITALIST

## 2025-04-05 PROCEDURE — 94761 N-INVAS EAR/PLS OXIMETRY MLT: CPT

## 2025-04-05 PROCEDURE — 85610 PROTHROMBIN TIME: CPT | Performed by: HOSPITALIST

## 2025-04-05 PROCEDURE — 25000003 PHARM REV CODE 250: Performed by: HOSPITALIST

## 2025-04-05 PROCEDURE — 11000001 HC ACUTE MED/SURG PRIVATE ROOM

## 2025-04-05 RX ADMIN — PRAZOSIN HYDROCHLORIDE 1 MG: 1 CAPSULE ORAL at 08:04

## 2025-04-05 RX ADMIN — GABAPENTIN 600 MG: 300 CAPSULE ORAL at 03:04

## 2025-04-05 RX ADMIN — ACETAMINOPHEN 1000 MG: 500 TABLET ORAL at 09:04

## 2025-04-05 RX ADMIN — DIPHENHYDRAMINE HYDROCHLORIDE 50 MG: 50 INJECTION, SOLUTION INTRAMUSCULAR; INTRAVENOUS at 01:04

## 2025-04-05 RX ADMIN — MORPHINE SULFATE 30 MG: 15 TABLET, FILM COATED, EXTENDED RELEASE ORAL at 09:04

## 2025-04-05 RX ADMIN — TIZANIDINE 4 MG: 2 TABLET ORAL at 03:04

## 2025-04-05 RX ADMIN — DIPHENHYDRAMINE HYDROCHLORIDE 50 MG: 50 INJECTION, SOLUTION INTRAMUSCULAR; INTRAVENOUS at 11:04

## 2025-04-05 RX ADMIN — SENNOSIDES AND DOCUSATE SODIUM 1 TABLET: 50; 8.6 TABLET ORAL at 08:04

## 2025-04-05 RX ADMIN — TIZANIDINE 4 MG: 2 TABLET ORAL at 08:04

## 2025-04-05 RX ADMIN — HYDROMORPHONE HYDROCHLORIDE 2 MG: 2 INJECTION INTRAMUSCULAR; INTRAVENOUS; SUBCUTANEOUS at 01:04

## 2025-04-05 RX ADMIN — DIPHENHYDRAMINE HYDROCHLORIDE 50 MG: 50 INJECTION, SOLUTION INTRAMUSCULAR; INTRAVENOUS at 04:04

## 2025-04-05 RX ADMIN — LACTULOSE 20 G: 20 SOLUTION ORAL at 03:04

## 2025-04-05 RX ADMIN — TIZANIDINE 4 MG: 2 TABLET ORAL at 12:04

## 2025-04-05 RX ADMIN — PANTOPRAZOLE SODIUM 40 MG: 40 TABLET, DELAYED RELEASE ORAL at 09:04

## 2025-04-05 RX ADMIN — LACTULOSE 20 G: 20 SOLUTION ORAL at 08:04

## 2025-04-05 RX ADMIN — HYDROMORPHONE HYDROCHLORIDE 2 MG: 2 INJECTION INTRAMUSCULAR; INTRAVENOUS; SUBCUTANEOUS at 09:04

## 2025-04-05 RX ADMIN — ACETAMINOPHEN 1000 MG: 500 TABLET ORAL at 06:04

## 2025-04-05 RX ADMIN — HYDROMORPHONE HYDROCHLORIDE 2 MG: 2 INJECTION INTRAMUSCULAR; INTRAVENOUS; SUBCUTANEOUS at 11:04

## 2025-04-05 RX ADMIN — HYDROMORPHONE HYDROCHLORIDE 2 MG: 2 INJECTION INTRAMUSCULAR; INTRAVENOUS; SUBCUTANEOUS at 08:04

## 2025-04-05 RX ADMIN — GABAPENTIN 600 MG: 300 CAPSULE ORAL at 09:04

## 2025-04-05 RX ADMIN — DIPHENHYDRAMINE HYDROCHLORIDE 50 MG: 50 INJECTION, SOLUTION INTRAMUSCULAR; INTRAVENOUS at 03:04

## 2025-04-05 RX ADMIN — HYDROMORPHONE HYDROCHLORIDE 2 MG: 2 INJECTION INTRAMUSCULAR; INTRAVENOUS; SUBCUTANEOUS at 04:04

## 2025-04-05 RX ADMIN — FLUOXETINE HYDROCHLORIDE 80 MG: 20 CAPSULE ORAL at 09:04

## 2025-04-05 RX ADMIN — ACETAMINOPHEN 1000 MG: 500 TABLET ORAL at 01:04

## 2025-04-05 RX ADMIN — DIPHENHYDRAMINE HYDROCHLORIDE 50 MG: 50 INJECTION, SOLUTION INTRAMUSCULAR; INTRAVENOUS at 08:04

## 2025-04-05 RX ADMIN — WARFARIN SODIUM 2.5 MG: 2.5 TABLET ORAL at 04:04

## 2025-04-05 RX ADMIN — HYDROMORPHONE HYDROCHLORIDE 2 MG: 2 INJECTION INTRAMUSCULAR; INTRAVENOUS; SUBCUTANEOUS at 06:04

## 2025-04-05 RX ADMIN — GABAPENTIN 600 MG: 300 CAPSULE ORAL at 08:04

## 2025-04-05 RX ADMIN — MORPHINE SULFATE 30 MG: 15 TABLET, FILM COATED, EXTENDED RELEASE ORAL at 03:04

## 2025-04-05 RX ADMIN — HYDROMORPHONE HYDROCHLORIDE 2 MG: 2 INJECTION INTRAMUSCULAR; INTRAVENOUS; SUBCUTANEOUS at 03:04

## 2025-04-05 RX ADMIN — MORPHINE SULFATE 30 MG: 15 TABLET, FILM COATED, EXTENDED RELEASE ORAL at 08:04

## 2025-04-05 RX ADMIN — LACTULOSE 20 G: 20 SOLUTION ORAL at 09:04

## 2025-04-05 RX ADMIN — FOLIC ACID 1 MG: 1 TABLET ORAL at 09:04

## 2025-04-05 RX ADMIN — PANTOPRAZOLE SODIUM 40 MG: 40 TABLET, DELAYED RELEASE ORAL at 08:04

## 2025-04-05 RX ADMIN — AMLODIPINE BESYLATE 10 MG: 5 TABLET ORAL at 09:04

## 2025-04-05 RX ADMIN — DIPHENHYDRAMINE HYDROCHLORIDE 50 MG: 50 INJECTION, SOLUTION INTRAMUSCULAR; INTRAVENOUS at 09:04

## 2025-04-05 RX ADMIN — DIPHENHYDRAMINE HYDROCHLORIDE 50 MG: 50 INJECTION, SOLUTION INTRAMUSCULAR; INTRAVENOUS at 06:04

## 2025-04-05 RX ADMIN — SENNOSIDES AND DOCUSATE SODIUM 1 TABLET: 50; 8.6 TABLET ORAL at 09:04

## 2025-04-05 NOTE — ASSESSMENT & PLAN NOTE
PE x 2 and supposed to be on anticoagulation indefinitely    Pt used to be on eliquis but changed to lovenox due to too expensive and not covered by insurance. Now pt cannot afford the Lovenox either and has been off this. Discussed with primary hematologist Dr. Stern and ok with starting warfarin. Monitor INR and set up with coumadin clinic on discharge.  Both Xarelto and apixaban are on her formulary at a level 5 - $600+.  With new acute thrombus in right IJ will ask hematology if there are additional recommendations or just continue warfarin.

## 2025-04-05 NOTE — SUBJECTIVE & OBJECTIVE
Interval History: Discussed ultrasound findings with the patient.  Oddly the pain is on the left side where she has a chronic, non occlusive thrombus.  But new acute thrombus is in the right IJ.  She still has the pain in her chest and left arm.    Review of Systems   Constitutional:  Negative for chills and fever.   Respiratory:  Negative for cough and shortness of breath.    Cardiovascular:  Negative for chest pain and palpitations.        Chest wall pain radiating to axilla and left arm.   Musculoskeletal:  Positive for arthralgias and gait problem.        RLE pain     Objective:     Vital Signs (Most Recent):  Temp: 98.1 °F (36.7 °C) (04/05/25 0736)  Pulse: 77 (04/05/25 0736)  Resp: 16 (04/05/25 0736)  BP: 137/68 (04/05/25 0736)  SpO2: 97 % (04/05/25 0736) Vital Signs (24h Range):  Temp:  [98.1 °F (36.7 °C)-99.2 °F (37.3 °C)] 98.1 °F (36.7 °C)  Pulse:  [75-88] 77  Resp:  [16-18] 16  SpO2:  [93 %-100 %] 97 %  BP: (124-174)/(68-89) 137/68     Weight: 94.3 kg (207 lb 14.3 oz)  Body mass index is 38.02 kg/m².    Intake/Output Summary (Last 24 hours) at 4/5/2025 0848  Last data filed at 4/5/2025 0344  Gross per 24 hour   Intake 380 ml   Output --   Net 380 ml         Physical Exam  Cardiovascular:      Rate and Rhythm: Normal rate and regular rhythm.      Heart sounds: Normal heart sounds. No murmur heard.     No gallop.   Pulmonary:      Effort: Pulmonary effort is normal.      Breath sounds: Normal breath sounds.   Abdominal:      General: Bowel sounds are normal.      Palpations: Abdomen is soft.   Musculoskeletal:      Comments: Feels pain in chest is deep, not really reproducible although pleuritic   Skin:     General: Skin is warm and dry.   Neurological:      General: No focal deficit present.      Mental Status: She is alert.   Psychiatric:         Behavior: Behavior normal.      Comments: Anxious               Significant Labs: All pertinent labs within the past 24 hours have been reviewed.    Significant  Imaging: I have reviewed all pertinent imaging results/findings within the past 24 hours.

## 2025-04-05 NOTE — PROGRESS NOTES
[facility] S[facility] [address] Medicine  Progress Note    Patient Name: Nazanin Malone  MRN: [id number]  Patient Class: IP- Inpatient   Admission Date: 3/25/2025  Length of Stay: 11 days  Attending Physician: Oksana Phillips MD  Primary Care Provider: Kezia, Primary Doctor        Subjective     Principal Problem:Sickle cell pain crisis        HPI:  HPI by Dr. 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 medications. Denies any trauma. Pt denies any other sx such as fever, cough, abd pain or chest pain.     In ED /109. Other vitals stable. Hb 9.7. Retic 1.1. UA unremarkable. Pt given multiple doses of dilaudid, benadryl 1L fluids and zofran. Admitted to hospital medicine for further mngx.     Overview/Hospital Course:  Patient admitted with HbSS crisis complicated by RLE joint pain.  Treated with IVF, pain control. Imaging was done and showed tricompartmental osteoarthritis of the right knee.  PT/OT consulted, recommended outpatient PT and ambulate with a cane.  Orthopedic surgery evaluated patient and recommended outpatient follow up.      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 Pt used to be on eliquis but changed to lovenox due to too expensive and not covered by insurance. Now pt cannot afford the Lovenox either and has been off this. Discussed with primary hematologist Dr. Stern and ok with starting warfarin. Monitor INR and set up with coumadin clinic on discharge.  Patient's INR went up rapidly and dose of warfarin decreased and then had to be held for a couple of days.  Resumed 4/4 at 2.5 mg daily.    Patient complained of LUE pain and shooting pains in her chest.  EKG  showed NSR and mildly prolonged QTc (486).  Troponin and CPK were normal.  Ultrasound of the LUE was ordered as she felt the pain was similar to when she had the LUE blood clot.  Ultrasound findings were notable for a new acute DVT in the right IJ and the chronic non occlusive thrombus in the left IJ.    Interval History: Discussed ultrasound findings with the patient.  Oddly the pain is on the left side where she has a chronic, non occlusive thrombus.  But new acute thrombus is in the right IJ.  She still has the pain in her chest and left arm.    Review of Systems   Constitutional:  Negative for chills and fever.   Respiratory:  Negative for cough and shortness of breath.    Cardiovascular:  Negative for chest pain and palpitations.        Chest wall pain radiating to axilla and left arm.   Musculoskeletal:  Positive for arthralgias and gait problem.        RLE pain     Objective:     Vital Signs (Most Recent):  Temp: 98.1 °F (36.7 °C) (04/05/25 0736)  Pulse: 77 (04/05/25 0736)  Resp: 16 (04/05/25 0736)  BP: 137/68 (04/05/25 0736)  SpO2: 97 % (04/05/25 0736) Vital Signs (24h Range):  Temp:  [98.1 °F (36.7 °C)-99.2 °F (37.3 °C)] 98.1 °F (36.7 °C)  Pulse:  [75-88] 77  Resp:  [16-18] 16  SpO2:  [93 %-100 %] 97 %  BP: (124-174)/(68-89) 137/68     Weight: 94.3 kg (207 lb 14.3 oz)  Body mass index is 38.02 kg/m².    Intake/Output Summary (Last 24 hours) at 4/5/2025 0825  Last data filed at 4/5/2025 0344  Gross per 24 hour   Intake 380 ml   Output --   Net 380 ml         Physical Exam  Cardiovascular:      Rate and Rhythm: Normal rate and regular rhythm.      Heart sounds: Normal heart sounds. No murmur heard.     No gallop.   Pulmonary:      Effort: Pulmonary effort is normal.      Breath sounds: Normal breath sounds.   Abdominal:      General: Bowel sounds are normal.      Palpations: Abdomen is soft.   Musculoskeletal:      Comments: Feels pain in chest is deep, not really reproducible although pleuritic   Skin:      General: Skin is warm and dry.   Neurological:      General: No focal deficit present.      Mental Status: She is alert.   Psychiatric:         Behavior: Behavior normal.      Comments: Anxious               Significant Labs: All pertinent labs within the past 24 hours have been reviewed.    Significant Imaging: I have reviewed all pertinent imaging results/findings within the past 24 hours.      Assessment & Plan  Sickle cell pain crisis  Patient presented with RLE pain due to sickle cell pain crisis.    Plan -   - Prefers morphine over dilaudid due to severe itching with dilaudid   - Morphine + benadryl PRN   - Cont home oral pain meds   - Cont home gabapentin   - Cont folic acid   - Ortho consulted, follow up outpatient for tricompartmental OA of right knee.   - IV fluids - held 3/31 due to arm swelling - patient notes this is chronic due to previous DVT.  Resumed 4/2  - PT - cane and outpt PT recommended  - Patient hopes to resume exchange transfusions - was getting them in [place] with good results.  - 4/4 - has increasing shooting chest wall pains on the left, axillary and arm pain.  Mildly tender on palpation but says pain feels deeper.  Also pruritic.  Pain atypical for CAD.  Ordered EKG (NSR), ultrasound, troponin/CPK, chest x-ray.  Await results.  Not ready for discharge yet.    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 Now pt cannot afford the Lovenox either and has been off this. Discussed with primary hematologist Dr. Stern and ok with starting warfarin. Monitor INR and set up with coumadin clinic on discharge.  Both Xarelto and apixaban are on her formulary at a level 5 - $600+.  With new acute thrombus in right IJ will ask hematology if there are additional  recommendations or just continue warfarin.      Hypertension  - cont home amlodipine and prazosin   Anxiety and depression  - cont home fluoxetine       VTE Risk Mitigation (From admission, onward)           Ordered     warfarin (COUMADIN) tablet 2.5 mg  Daily         04/04/25 0845     IP VTE HIGH RISK PATIENT  Once         03/25/25 1348     Place sequential compression device  Until discontinued         03/25/25 1348     Reason for No Pharmacological VTE Prophylaxis  Once        Question:  Reasons:  Answer:  Already adequately anticoagulated on oral Anticoagulants    03/25/25 1348                    Discharge Planning   ALYSE: 4/8/2025     Code Status: Full Code   Medical Readiness for Discharge Date:   Discharge Plan A: Home with family   Discharge Delays: None known at this time                    Oksana Wall MD  Department of Hospital Medicine   [facility] ([address])

## 2025-04-06 VITALS
BODY MASS INDEX: 38.25 KG/M2 | DIASTOLIC BLOOD PRESSURE: 84 MMHG | RESPIRATION RATE: 16 BRPM | TEMPERATURE: 99 F | SYSTOLIC BLOOD PRESSURE: 175 MMHG | OXYGEN SATURATION: 94 % | WEIGHT: 207.88 LBS | HEART RATE: 93 BPM | HEIGHT: 62 IN

## 2025-04-06 LAB
INR PPP: 2.4 (ref 0.8–1.2)
PROTHROMBIN TIME: 25.5 SECONDS (ref 9–12.5)

## 2025-04-06 PROCEDURE — 25000003 PHARM REV CODE 250: Performed by: STUDENT IN AN ORGANIZED HEALTH CARE EDUCATION/TRAINING PROGRAM

## 2025-04-06 PROCEDURE — 36415 COLL VENOUS BLD VENIPUNCTURE: CPT | Performed by: HOSPITALIST

## 2025-04-06 PROCEDURE — 25000003 PHARM REV CODE 250: Performed by: HOSPITALIST

## 2025-04-06 PROCEDURE — 63600175 PHARM REV CODE 636 W HCPCS: Performed by: HOSPITALIST

## 2025-04-06 PROCEDURE — 85610 PROTHROMBIN TIME: CPT | Performed by: HOSPITALIST

## 2025-04-06 RX ORDER — MORPHINE SULFATE 30 MG/1
30 TABLET, FILM COATED, EXTENDED RELEASE ORAL 2 TIMES DAILY
Start: 2025-04-06 | End: 2025-04-09

## 2025-04-06 RX ORDER — HEPARIN SODIUM (PORCINE) LOCK FLUSH IV SOLN 100 UNIT/ML 100 UNIT/ML
100 SOLUTION INTRAVENOUS ONCE
Status: COMPLETED | OUTPATIENT
Start: 2025-04-06 | End: 2025-04-06

## 2025-04-06 RX ORDER — TIZANIDINE 4 MG/1
4 TABLET ORAL EVERY 8 HOURS PRN
Qty: 30 TABLET | Refills: 0 | Status: ON HOLD | OUTPATIENT
Start: 2025-04-06 | End: 2025-04-16

## 2025-04-06 RX ADMIN — SENNOSIDES AND DOCUSATE SODIUM 1 TABLET: 50; 8.6 TABLET ORAL at 08:04

## 2025-04-06 RX ADMIN — MORPHINE SULFATE 30 MG: 15 TABLET, FILM COATED, EXTENDED RELEASE ORAL at 08:04

## 2025-04-06 RX ADMIN — WARFARIN SODIUM 2.5 MG: 2.5 TABLET ORAL at 05:04

## 2025-04-06 RX ADMIN — TIZANIDINE 4 MG: 2 TABLET ORAL at 06:04

## 2025-04-06 RX ADMIN — HEPARIN 100 UNITS: 100 SYRINGE at 05:04

## 2025-04-06 RX ADMIN — ACETAMINOPHEN 1000 MG: 500 TABLET ORAL at 06:04

## 2025-04-06 RX ADMIN — FLUOXETINE HYDROCHLORIDE 80 MG: 20 CAPSULE ORAL at 08:04

## 2025-04-06 RX ADMIN — PANTOPRAZOLE SODIUM 40 MG: 40 TABLET, DELAYED RELEASE ORAL at 08:04

## 2025-04-06 RX ADMIN — ACETAMINOPHEN 1000 MG: 500 TABLET ORAL at 01:04

## 2025-04-06 RX ADMIN — GABAPENTIN 600 MG: 300 CAPSULE ORAL at 03:04

## 2025-04-06 RX ADMIN — DIPHENHYDRAMINE HYDROCHLORIDE 50 MG: 50 INJECTION, SOLUTION INTRAMUSCULAR; INTRAVENOUS at 09:04

## 2025-04-06 RX ADMIN — HYDROMORPHONE HYDROCHLORIDE 2 MG: 2 INJECTION INTRAMUSCULAR; INTRAVENOUS; SUBCUTANEOUS at 04:04

## 2025-04-06 RX ADMIN — HYDROMORPHONE HYDROCHLORIDE 2 MG: 2 INJECTION INTRAMUSCULAR; INTRAVENOUS; SUBCUTANEOUS at 01:04

## 2025-04-06 RX ADMIN — HYDROMORPHONE HYDROCHLORIDE 2 MG: 2 INJECTION INTRAMUSCULAR; INTRAVENOUS; SUBCUTANEOUS at 03:04

## 2025-04-06 RX ADMIN — DIPHENHYDRAMINE HYDROCHLORIDE 50 MG: 50 INJECTION, SOLUTION INTRAMUSCULAR; INTRAVENOUS at 01:04

## 2025-04-06 RX ADMIN — MORPHINE SULFATE 30 MG: 15 TABLET, FILM COATED, EXTENDED RELEASE ORAL at 03:04

## 2025-04-06 RX ADMIN — FOLIC ACID 1 MG: 1 TABLET ORAL at 08:04

## 2025-04-06 RX ADMIN — HYDROMORPHONE HYDROCHLORIDE 2 MG: 2 INJECTION INTRAMUSCULAR; INTRAVENOUS; SUBCUTANEOUS at 09:04

## 2025-04-06 RX ADMIN — DIPHENHYDRAMINE HYDROCHLORIDE 50 MG: 50 INJECTION, SOLUTION INTRAMUSCULAR; INTRAVENOUS at 04:04

## 2025-04-06 RX ADMIN — AMLODIPINE BESYLATE 10 MG: 5 TABLET ORAL at 08:04

## 2025-04-06 RX ADMIN — DIPHENHYDRAMINE HYDROCHLORIDE 50 MG: 50 INJECTION, SOLUTION INTRAMUSCULAR; INTRAVENOUS at 06:04

## 2025-04-06 RX ADMIN — GABAPENTIN 600 MG: 300 CAPSULE ORAL at 08:04

## 2025-04-06 RX ADMIN — HYDROMORPHONE HYDROCHLORIDE 2 MG: 2 INJECTION INTRAMUSCULAR; INTRAVENOUS; SUBCUTANEOUS at 06:04

## 2025-04-06 RX ADMIN — DIPHENHYDRAMINE HYDROCHLORIDE 50 MG: 50 INJECTION, SOLUTION INTRAMUSCULAR; INTRAVENOUS at 03:04

## 2025-04-06 RX ADMIN — LACTULOSE 20 G: 20 SOLUTION ORAL at 03:04

## 2025-04-06 RX ADMIN — LACTULOSE 20 G: 20 SOLUTION ORAL at 08:04

## 2025-04-06 NOTE — DISCHARGE SUMMARY
[facility] Surg ([address] Medicine  Discharge Summary      Patient Name: Nazanin Malone  MRN: [id number]  VLADIMIR: [id number]  Patient Class: IP- Inpatient  Admission Date: 3/25/2025  Hospital Length of Stay: 12 days  Discharge Date and Time: 04/06/2025 4:33 PM  Attending Physician: Oksana Phillips MD   Discharging Provider: Oksana Phillips MD  Primary Care Provider: Kezia, Primary Doctor    Primary Care Team: Networked reference to record PCT     HPI:   HPI by Dr. 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 medications. Denies any trauma. Pt denies any other sx such as fever, cough, abd pain or chest pain.     In ED /109. Other vitals stable. Hb 9.7. Retic 1.1. UA unremarkable. Pt given multiple doses of dilaudid, benadryl 1L fluids and zofran. Admitted to hospital medicine for further mngx.           Hospital Course:   Patient admitted with HbSS crisis complicated by RLE joint pain.  Treated with IVF, pain control. Imaging was done and showed tricompartmental osteoarthritis of the right knee.  PT/OT consulted, recommended outpatient PT and ambulate with a cane.  Orthopedic surgery evaluated patient and recommended outpatient follow up.      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 Pt used to be on eliquis but changed to lovenox due to too expensive and not covered by insurance. Now pt cannot afford the Lovenox either and has been off this. Discussed with primary hematologist Dr. Stern and ok with starting warfarin. Monitor INR and set up with coumadin clinic on discharge.  Patient's INR went up rapidly and dose of warfarin decreased and then had to be held for a couple of  days.  Resumed 4/4 at 2.5 mg daily and INR was 2.4 on discharge.    Patient complained of LUE pain and shooting pains in her chest.  EKG showed NSR and mildly prolonged QTc (486).  Troponin and CPK were normal.  Ultrasound of the LUE was ordered as she felt the pain was similar to when she had the LUE blood clot.  Ultrasound findings were notable for a new acute DVT in the right IJ and the chronic non occlusive thrombus in the left IJ.  Discussed with hem/onc via secure chat, recommended to continue warfarin and follow up closely in Coumadin clinic.  Patient was seen and examined prior to discharge and was feeling much improved.     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 Call in 1 day(s).    Specialty: Hematology and Oncology  Why: call to schedule (SW sent message to Dr. Soni's nurse)  Contact information:  [address] [place], [place] [id number] [phone]               Jarred Clark MD Follow up.    Specialty: Hematology and Oncology  Contact information:  [address] [place] [address] [phone]                           Patient  Instructions:      Ambulatory referral/consult to Anticoagulation Monitoring (PHARMACIST MANAGED)   Standing Status: Future   Referral Priority: Routine Referral Type: Consultation   Referral Reason: Specialty Services Required   Requested Specialty: Hematology   Number of Visits Requested: 1     Diet Adult Regular     Activity as tolerated         Medications:  Reconciled Home Medications:      Medication List        START taking these medications      warfarin 2.5 MG tablet  Commonly known as: COUMADIN  Take 1 tablet (2.5 mg total) by mouth Daily.            CHANGE how you take these medications      tiZANidine 4 MG tablet  Commonly known as: ZANAFLEX  Take 1 tablet (4 mg total) by mouth every 8 (eight) hours as needed (Muscle spasms).  What changed:   when to take this  reasons to take this            CONTINUE taking these medications      acetaminophen 325 MG tablet  Commonly known as: TYLENOL  Take 2 tablets (650 mg total) by mouth every 8 (eight) hours as needed for Pain.     albuterol 90 mcg/actuation inhaler  Commonly known as: VENTOLIN HFA  Inhale 2 puffs into the lungs every 6 (six) hours as needed for Wheezing or Shortness of Breath. Rescue     amLODIPine 10 MG tablet  Commonly known as: NORVASC  Take 1 tablet (10 mg total) by mouth once daily.     enoxaparin 40 mg/0.4 mL Syrg  Commonly known as: LOVENOX  Inject 0.4 mLs (40 mg total) into the skin every 12 (twelve) hours.     FLUoxetine 40 MG capsule  Take 2 capsules (80 mg total) by mouth once daily.     fluticasone propionate 50 mcg/actuation nasal spray  Commonly known as: FLONASE  INSTILL 1 SPRAY INTO EACH NOSTRIL EVERY DAY     folic acid 1 MG tablet  Commonly known as: FOLVITE  Take 1 tablet (1 mg total) by mouth once daily.     gabapentin 300 MG capsule  Commonly known as: NEURONTIN  Take 2 capsules (600 mg total) by mouth 3 (three) times daily.     HYDROcodone-acetaminophen  mg per tablet  Commonly known as: NORCO  Take 1 tablet by mouth  every 4 to 6 hours as needed for Pain.     hydroxyurea 500 mg Cap  Commonly known as: HYDREA  Take 2 capsules (1,000 mg total) by mouth once daily.     metoclopramide HCl 5 MG tablet  Commonly known as: REGLAN  Take 5 mg by mouth 4 (four) times daily.     morphine 30 MG 12 hr tablet  Commonly known as: MS CONTIN  Take 1 tablet (30 mg total) by mouth 2 (two) times daily.     naloxone 4 mg/actuation Spry  Commonly known as: NARCAN  4mg by nasal route as needed for opioid overdose; may repeat every 2-3 minutes in alternating nostrils until medical help arrives. Call 911     pantoprazole 40 MG tablet  Commonly known as: PROTONIX  Take 1 tablet (40 mg total) by mouth 2 (two) times daily.     prazosin 1 MG Cap  Commonly known as: MINIPRESS  Take 1 capsule (1 mg total) by mouth every evening.     senna-docusate 8.6-50 mg per tablet  Commonly known as: PERICOLACE  Take 1 tablet by mouth daily as needed for Constipation.     sucralfate 1 gram tablet  Commonly known as: CARAFATE  Take 1 tablet (1 g total) by mouth 4 (four) times daily before meals and nightly.     VITAMIN C ORAL  Take 1 capsule by mouth 2 (two) times a day.              Indwelling Lines/Drains at time of discharge:   Lines/Drains/Airways       Central Venous Catheter Line  Duration             Port A Cath Single Lumen 02/10/25 1200 Atrial Right 55 days                    Time spent on the discharge of patient: >30 minutes         Oksana Wall MD  Department of Hospital Medicine  [facility] ([address])

## 2025-04-06 NOTE — PLAN OF CARE
04/06/25 1132   Medicare Message   Important Message from Medicare regarding Discharge Appeal Rights Given to patient/caregiver;Explained to patient/caregiver;Signed/date by patient/caregiver   Date IMM was signed 04/06/25   Time IMM was signed 1112

## 2025-04-06 NOTE — PLAN OF CARE
Case Management Final Discharge Note    Discharge Disposition: home with family    New DME ordered / company name: none    Relevant SDOH / Transition of Care Barriers:  none    Person available to provide assistance at home when needed and their contact information: self    Scheduled followup appointment: Hematology-message sent    Referrals placed: none    Transportation: Patient family to transport patient home.         Patient and family educated on discharge services and updated on DC plan. Bedside RN notified, patient clear to discharge from Case Management Perspective.      [facility] - Med Surg ([address])  Discharge Final Note    Primary Care Provider: Kezia, Primary Doctor    Expected Discharge Date: 4/6/2025    Final Discharge Note (most recent)       Final Note - 04/06/25 1608          Final Note    Assessment Type Final Discharge Note     Anticipated Discharge Disposition Home or Self Care     Hospital Resources/Appts/Education Provided Provided patient/caregiver with written discharge plan information        Post-Acute Status    Discharge Delays None known at this time                     Important Message from Medicare  Important Message from Medicare regarding Discharge Appeal Rights: Given to patient/caregiver, Explained to patient/caregiver, Signed/date by patient/caregiver     Date IMM was signed: 04/06/25  Time IMM was signed: 1114    Contact Info       Jarred Clark MD   Specialty: Hematology and Oncology    [facility] [address]   Phone: [phone]       Next Steps: Follow up    Instructions: As scheduled

## 2025-04-06 NOTE — NURSING
Discharge summary, health teachings and instructions given to patient. Flushed chest port and needle removed. All questions answered with regards to discharge instructions. Reminded of the importance of follow-up appointments.

## 2025-04-07 ENCOUNTER — PATIENT MESSAGE (OUTPATIENT)
Dept: CARDIOLOGY | Facility: CLINIC | Age: 45
End: 2025-04-07
Payer: MEDICARE

## 2025-04-07 ENCOUNTER — TELEPHONE (OUTPATIENT)
Dept: HEMATOLOGY/ONCOLOGY | Facility: CLINIC | Age: 45
End: 2025-04-07
Payer: MEDICARE

## 2025-04-07 ENCOUNTER — ANTI-COAG VISIT (OUTPATIENT)
Dept: CARDIOLOGY | Facility: CLINIC | Age: 45
End: 2025-04-07
Payer: MEDICARE

## 2025-04-07 DIAGNOSIS — Z79.01 CHRONIC ANTICOAGULATION: Primary | Chronic | ICD-10-CM

## 2025-04-07 DIAGNOSIS — Z86.711 HISTORY OF PULMONARY EMBOLISM: Chronic | ICD-10-CM

## 2025-04-07 LAB
OHS QRS DURATION: 94 MS
OHS QTC CALCULATION: 486 MS

## 2025-04-07 NOTE — TELEPHONE ENCOUNTER
----- Message from FRANCIS Montalvo sent at 4/7/2025  8:49 AM CDT -----  Regarding: RE: appt request  Yes please  ----- Message -----  From: Nick Marcos  Sent: 4/7/2025   8:26 AM CDT  To: Zachariah iLlly Staff  Subject: FW: appt request                                 MRN: [id number]    Do we just schedule this pt with Zachariah? See below!  ----- Message -----  From: Jose Longoria LMSW  Sent: 4/6/2025  11:37 AM CDT  To: Zachariah Lilly Staff; Eduardo Stern Staff; [facility] B#  Subject: appt request                                     Good morning,Patient will d/c from [facility] today, patient needs a hospital follow up for sickle cell pain. Switching providers from Dr. Clark to Dr. Soni. Please call patient to schedule. Thank you!

## 2025-04-07 NOTE — PROGRESS NOTES
Hospital Course: Patient admitted with HbSS crisis complicated by RLE joint pain.  Treated with IVF, pain control. Imaging was done and showed tricompartmental osteoarthritis of the right knee.  PT/OT consulted, recommended outpatient PT and ambulate with a cane.  Orthopedic surgery evaluated patient and recommended outpatient follow up.       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 Pt used to be on eliquis but changed to lovenox due to too expensive and not covered by insurance. Now pt cannot afford the Lovenox either and has been off this. Discussed with primary hematologist Dr. Stern and ok with starting warfarin. Monitor INR and set up with coumadin clinic on discharge.  Patient's INR went up rapidly and dose of warfarin decreased and then had to be held for a couple of days.  Resumed 4/4 at 2.5 mg daily and INR was 2.4 on discharge.     Patient complained of LUE pain and shooting pains in her chest.  EKG showed NSR and mildly prolonged QTc (486).  Troponin and CPK were normal.  Ultrasound of the LUE was ordered as she felt the pain was similar to when she had the LUE blood clot.  Ultrasound findings were notable for a new acute DVT in the right IJ and the chronic non occlusive thrombus in the left IJ.  Discussed with hem/onc via secure chat, recommended to continue warfarin and follow up closely in Coumadin clinic.  Patient was seen and examined prior to discharge and was feeling much improved.    Anticoagulation Plan: Will have patient trial a weekly dose of 27.5mg, INR to be drawn on 4/10.

## 2025-04-07 NOTE — PROGRESS NOTES
Patient verified Coumadin as 2.5mg light green tablet. Pt advised to take 5mg Monday and Wednesday; 2.5mg on Tuesday.  Patient to take Coumadin as prescribed until instructed otherwise and INR due date 4/10/25 @ [facility].    Patient education completed regarding vitamin K diet, when to contact Coumadin Clinic, and Coumadin must be taken after 5 pm.  Diet plan is to eat high vitamin K foods 2-3x per week. She does not drink alcohol.

## 2025-04-07 NOTE — TELEPHONE ENCOUNTER
----- Message from  Jose sent at 4/6/2025 11:35 AM CDT -----  Regarding: appt request  Good morning,Patient will d/c from [facility] today, patient needs a hospital follow up for sickle cell pain. Switching providers from Dr. Clark to Dr. Soni. Please call patient to schedule. Thank you!

## 2025-04-07 NOTE — TELEPHONE ENCOUNTER
----- Message from Med Assistant Munson sent at 4/7/2025  8:52 AM CDT -----  Regarding: FW: appt request    ----- Message -----  From: Jose Longoria LMSW  Sent: 4/6/2025  11:37 AM CDT  To: Zachariah Lilly Staff; Eduardo Stern Staff; [facility] B#  Subject: appt request                                     Good morning,Patient will d/c from [facility] today, patient needs a hospital follow up for sickle cell pain. Switching providers from Dr. Clark to Dr. Soni. Please call patient to schedule. Thank you!

## 2025-04-07 NOTE — TELEPHONE ENCOUNTER
----- Message from  Jose sent at 4/6/2025 11:35 AM CDT -----  Regarding: appt request  Good morning,Patient will d/c from [facility] today, patient needs a hospital follow up for sickle cell pain. Switching providers from Dr. Clark to Dr. Soni. Please call patient to schedule. Thank you!

## 2025-04-09 ENCOUNTER — TELEPHONE (OUTPATIENT)
Dept: HEMATOLOGY/ONCOLOGY | Facility: CLINIC | Age: 45
End: 2025-04-09
Payer: MEDICARE

## 2025-04-09 DIAGNOSIS — D57.00 HB-SS DISEASE WITH VASO-OCCLUSIVE PAIN: ICD-10-CM

## 2025-04-09 NOTE — TELEPHONE ENCOUNTER
Refill already joshua'd up for Dr. Clark. Waiting on MD to send the prescription over to the pharmacy.

## 2025-04-09 NOTE — TELEPHONE ENCOUNTER
----- Message from Linda sent at 4/9/2025  4:21 PM CDT -----  Regarding: Refill Request  Contact: Pt @[phone]   Is this a Refill or New Rx: Refill Rx Name and Strength: morphine (MS CONTIN extended release  Preferred Pharmacy with phone number: [facility] [facility] [address]Ovwbc: [phone] Fax: [phone]

## 2025-04-09 NOTE — TELEPHONE ENCOUNTER
----- Message from Violette sent at 4/9/2025  9:23 AM CDT -----  Regarding: Refill  Contact: [phone]  Rx Name:  morphine (MS CONTIN) 30 MG 12 hr tablet  Preferred Pharmacy with number:  [facility] [facility] [address]Srzpw: [phone] Fax: [phone] Ordering Provider: Jarred Clark MD Contact preference: [phone]/Notes:  Requesting refill

## 2025-04-10 ENCOUNTER — PATIENT MESSAGE (OUTPATIENT)
Dept: HEMATOLOGY/ONCOLOGY | Facility: HOSPITAL | Age: 45
End: 2025-04-10
Payer: MEDICARE

## 2025-04-10 ENCOUNTER — LAB VISIT (OUTPATIENT)
Dept: LAB | Facility: HOSPITAL | Age: 45
End: 2025-04-10
Payer: MEDICARE

## 2025-04-10 DIAGNOSIS — Z79.01 CHRONIC ANTICOAGULATION: Chronic | ICD-10-CM

## 2025-04-10 DIAGNOSIS — D57.00 SICKLE CELL PAIN CRISIS: ICD-10-CM

## 2025-04-10 LAB
INR PPP: 7.9 (ref 0.8–1.2)
PROTHROMBIN TIME: 74.2 SECONDS (ref 9–12.5)

## 2025-04-10 PROCEDURE — 36415 COLL VENOUS BLD VENIPUNCTURE: CPT

## 2025-04-10 PROCEDURE — 85610 PROTHROMBIN TIME: CPT

## 2025-04-10 RX ORDER — MORPHINE SULFATE 30 MG/1
30 TABLET, FILM COATED, EXTENDED RELEASE ORAL 3 TIMES DAILY
Start: 2025-04-10 | End: 2025-04-12 | Stop reason: CLARIF

## 2025-04-10 RX ORDER — HYDROCODONE BITARTRATE AND ACETAMINOPHEN 10; 325 MG/1; MG/1
1 TABLET ORAL
Qty: 120 TABLET | Refills: 0 | Status: SHIPPED | OUTPATIENT
Start: 2025-04-10

## 2025-04-10 NOTE — TELEPHONE ENCOUNTER
"----- Message from Jesse sent at 4/10/2025  1:55 PM CDT -----  RX Name and Strength:  HYDROcodone-acetaminophen (NORCO)  mg per tablet How is the patient currently taking it?  Take 1 tablet by mouth every 4 to 6 hours as needed for Pain. - OralIs this a 30 day or 90 day Rx?  120 tabletPreferred Pharmacy with phone number: [facility] [facility] [address]Bubps: [phone] Fax: [phone] or Mail Order: LocalOrdering Provider: Bernardo Preference: [phone] Information: Pt also inquiring about status for morphine (MS CONTIN) 30 MG 12 hr tablet refill"Thank you for all that you do for our patients"  "

## 2025-04-11 ENCOUNTER — ANTI-COAG VISIT (OUTPATIENT)
Dept: CARDIOLOGY | Facility: CLINIC | Age: 45
End: 2025-04-11
Payer: MEDICARE

## 2025-04-11 ENCOUNTER — PATIENT MESSAGE (OUTPATIENT)
Dept: CARDIOLOGY | Facility: CLINIC | Age: 45
End: 2025-04-11

## 2025-04-11 ENCOUNTER — HOSPITAL ENCOUNTER (INPATIENT)
Facility: HOSPITAL | Age: 45
LOS: 16 days | Discharge: HOME HEALTH | DRG: 813 | End: 2025-04-28
Attending: EMERGENCY MEDICINE | Admitting: STUDENT IN AN ORGANIZED HEALTH CARE EDUCATION/TRAINING PROGRAM
Payer: MEDICARE

## 2025-04-11 DIAGNOSIS — S50.12XA HEMATOMA OF LEFT FOREARM: ICD-10-CM

## 2025-04-11 DIAGNOSIS — D57.00 HB-SS DISEASE WITH VASO-OCCLUSIVE PAIN: ICD-10-CM

## 2025-04-11 DIAGNOSIS — K21.9 GASTROESOPHAGEAL REFLUX DISEASE, UNSPECIFIED WHETHER ESOPHAGITIS PRESENT: ICD-10-CM
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
## 2025-04-11 DIAGNOSIS — D62 ACUTE BLOOD LOSS ANEMIA: ICD-10-CM

## 2025-04-11 DIAGNOSIS — R79.1 SUPRATHERAPEUTIC INR: Primary | ICD-10-CM

## 2025-04-11 DIAGNOSIS — Z86.711 HISTORY OF PULMONARY EMBOLISM: Chronic | ICD-10-CM

## 2025-04-11 DIAGNOSIS — N64.89 HEMATOMA OF RIGHT BREAST: ICD-10-CM

## 2025-04-11 DIAGNOSIS — D68.9 COAGULOPATHY: ICD-10-CM

## 2025-04-11 DIAGNOSIS — R07.9 CHEST PAIN: ICD-10-CM
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
## 2025-04-11 DIAGNOSIS — Z79.01 CHRONIC ANTICOAGULATION: Primary | Chronic | ICD-10-CM

## 2025-04-11 DIAGNOSIS — N64.89 BREAST HEMATOMA: ICD-10-CM

## 2025-04-11 LAB
ABSOLUTE EOSINOPHIL (OHS): 0.16 K/UL
ABSOLUTE MONOCYTE (OHS): 1.12 K/UL (ref 0.3–1)
ABSOLUTE NEUTROPHIL COUNT (OHS): 7.82 K/UL (ref 1.8–7.7)
ALBUMIN SERPL BCP-MCNC: 4 G/DL (ref 3.5–5.2)
ALP SERPL-CCNC: 110 UNIT/L (ref 40–150)
ALT SERPL W/O P-5'-P-CCNC: 16 UNIT/L (ref 10–44)
ANION GAP (OHS): 8 MMOL/L (ref 8–16)
APTT PPP: 61.2 SECONDS (ref 21–32)
AST SERPL-CCNC: 21 UNIT/L (ref 11–45)
BASOPHILS # BLD AUTO: 0.05 K/UL
BASOPHILS NFR BLD AUTO: 0.4 %
BILIRUB SERPL-MCNC: 0.5 MG/DL (ref 0.1–1)
BLOOD GROUP ANTIBODIES SERPL: NORMAL
BUN SERPL-MCNC: 11 MG/DL (ref 6–20)
CALCIUM SERPL-MCNC: 9.6 MG/DL (ref 8.7–10.5)
CHLORIDE SERPL-SCNC: 104 MMOL/L (ref 95–110)
CO2 SERPL-SCNC: 22 MMOL/L (ref 23–29)
CREAT SERPL-MCNC: 1.4 MG/DL (ref 0.5–1.4)
CREAT SERPL-MCNC: 1.5 MG/DL (ref 0.5–1.4)
ERYTHROCYTE [DISTWIDTH] IN BLOOD BY AUTOMATED COUNT: 21.9 % (ref 11.5–14.5)
GFR SERPLBLD CREATININE-BSD FMLA CKD-EPI: 47 ML/MIN/1.73/M2
GLUCOSE SERPL-MCNC: 89 MG/DL (ref 70–110)
HCT VFR BLD AUTO: 29.6 % (ref 37–48.5)
HGB BLD-MCNC: 9.9 GM/DL (ref 12–16)
IMM GRANULOCYTES # BLD AUTO: 0.04 K/UL (ref 0–0.04)
IMM GRANULOCYTES NFR BLD AUTO: 0.3 % (ref 0–0.5)
INDIRECT COOMBS: ABNORMAL
INDIRECT COOMBS: ABNORMAL
INR PPP: 8.3 (ref 0.8–1.2)
LYMPHOCYTES # BLD AUTO: 2.5 K/UL (ref 1–4.8)
MCH RBC QN AUTO: 22.4 PG (ref 27–31)
MCHC RBC AUTO-ENTMCNC: 33.4 G/DL (ref 32–36)
MCV RBC AUTO: 67 FL (ref 82–98)
NUCLEATED RBC (/100WBC) (OHS): 1 /100 WBC
PLATELET # BLD AUTO: 608 K/UL (ref 150–450)
PMV BLD AUTO: 9.2 FL (ref 9.2–12.9)
POTASSIUM SERPL-SCNC: 3.2 MMOL/L (ref 3.5–5.1)
PROT SERPL-MCNC: 8.3 GM/DL (ref 6–8.4)
PROTHROMBIN TIME: 78 SECONDS (ref 9–12.5)
RBC # BLD AUTO: 4.42 M/UL (ref 4–5.4)
RELATIVE EOSINOPHIL (OHS): 1.4 %
RELATIVE LYMPHOCYTE (OHS): 21.4 % (ref 18–48)
RELATIVE MONOCYTE (OHS): 9.6 % (ref 4–15)
RELATIVE NEUTROPHIL (OHS): 66.9 % (ref 38–73)
RH BLD: ABNORMAL
RH BLD: ABNORMAL
SAMPLE: ABNORMAL
SODIUM SERPL-SCNC: 134 MMOL/L (ref 136–145)
SPECIMEN OUTDATE: ABNORMAL
SPECIMEN OUTDATE: ABNORMAL
WBC # BLD AUTO: 11.69 K/UL (ref 3.9–12.7)

## 2025-04-11 PROCEDURE — 96375 TX/PRO/DX INJ NEW DRUG ADDON: CPT

## 2025-04-11 PROCEDURE — 63600175 PHARM REV CODE 636 W HCPCS: Performed by: STUDENT IN AN ORGANIZED HEALTH CARE EDUCATION/TRAINING PROGRAM

## 2025-04-11 PROCEDURE — 86901 BLOOD TYPING SEROLOGIC RH(D): CPT | Mod: 91 | Performed by: EMERGENCY MEDICINE

## 2025-04-11 PROCEDURE — 86870 RBC ANTIBODY IDENTIFICATION: CPT | Performed by: EMERGENCY MEDICINE

## 2025-04-11 PROCEDURE — 99900035 HC TECH TIME PER 15 MIN (STAT)

## 2025-04-11 PROCEDURE — 25000003 PHARM REV CODE 250: Performed by: EMERGENCY MEDICINE

## 2025-04-11 PROCEDURE — 82565 ASSAY OF CREATININE: CPT

## 2025-04-11 PROCEDURE — G0378 HOSPITAL OBSERVATION PER HR: HCPCS

## 2025-04-11 PROCEDURE — 99285 EMERGENCY DEPT VISIT HI MDM: CPT | Mod: 25

## 2025-04-11 PROCEDURE — 85610 PROTHROMBIN TIME: CPT | Performed by: EMERGENCY MEDICINE

## 2025-04-11 PROCEDURE — 63600175 PHARM REV CODE 636 W HCPCS: Performed by: EMERGENCY MEDICINE

## 2025-04-11 PROCEDURE — 96376 TX/PRO/DX INJ SAME DRUG ADON: CPT

## 2025-04-11 PROCEDURE — 85025 COMPLETE CBC W/AUTO DIFF WBC: CPT | Performed by: EMERGENCY MEDICINE

## 2025-04-11 PROCEDURE — 96365 THER/PROPH/DIAG IV INF INIT: CPT

## 2025-04-11 PROCEDURE — 85730 THROMBOPLASTIN TIME PARTIAL: CPT | Performed by: EMERGENCY MEDICINE

## 2025-04-11 PROCEDURE — 81025 URINE PREGNANCY TEST: CPT | Performed by: EMERGENCY MEDICINE

## 2025-04-11 PROCEDURE — 80053 COMPREHEN METABOLIC PANEL: CPT | Performed by: EMERGENCY MEDICINE

## 2025-04-11 PROCEDURE — 25500020 PHARM REV CODE 255: Performed by: EMERGENCY MEDICINE

## 2025-04-11 RX ORDER — HYDROMORPHONE HYDROCHLORIDE 2 MG/ML
1 INJECTION, SOLUTION INTRAMUSCULAR; INTRAVENOUS; SUBCUTANEOUS
Refills: 0 | Status: DISCONTINUED | OUTPATIENT
Start: 2025-04-11 | End: 2025-04-11

## 2025-04-11 RX ORDER — HYDROMORPHONE HYDROCHLORIDE 2 MG/ML
1 INJECTION, SOLUTION INTRAMUSCULAR; INTRAVENOUS; SUBCUTANEOUS
Refills: 0 | Status: COMPLETED | OUTPATIENT
Start: 2025-04-11 | End: 2025-04-11

## 2025-04-11 RX ORDER — HYDROMORPHONE HYDROCHLORIDE 2 MG/ML
2 INJECTION, SOLUTION INTRAMUSCULAR; INTRAVENOUS; SUBCUTANEOUS
Refills: 0 | Status: COMPLETED | OUTPATIENT
Start: 2025-04-11 | End: 2025-04-11

## 2025-04-11 RX ADMIN — HYDROMORPHONE HYDROCHLORIDE 1 MG: 2 INJECTION INTRAMUSCULAR; INTRAVENOUS; SUBCUTANEOUS at 08:04

## 2025-04-11 RX ADMIN — IOHEXOL 150 ML: 350 INJECTION, SOLUTION INTRAVENOUS at 06:04

## 2025-04-11 RX ADMIN — HYDROMORPHONE HYDROCHLORIDE 2 MG: 2 INJECTION INTRAMUSCULAR; INTRAVENOUS; SUBCUTANEOUS at 10:04

## 2025-04-11 RX ADMIN — HYDROMORPHONE HYDROCHLORIDE 1 MG: 2 INJECTION INTRAMUSCULAR; INTRAVENOUS; SUBCUTANEOUS at 05:04

## 2025-04-11 RX ADMIN — PHYTONADIONE 10 MG: 10 INJECTION, EMULSION INTRAMUSCULAR; INTRAVENOUS; SUBCUTANEOUS at 10:04

## 2025-04-11 RX ADMIN — HYDROMORPHONE HYDROCHLORIDE 1 MG: 2 INJECTION INTRAMUSCULAR; INTRAVENOUS; SUBCUTANEOUS at 07:04
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
## 2025-04-11 NOTE — PROGRESS NOTES
[facility]    04/11/2025    Nazanin Malone (44 y.o.) is followed by the [facility].      Assessment/Plan:    Nazanin Malone presents with a supratherapeutic INR. Goal INR: 2.0-3.0    Lab Results   Component Value Date    INR 7.9 (HH) 04/10/2025    INR 2.4 (H) 04/06/2025    INR 2.3 (H) 04/05/2025       Assessment of patient findings per MA/LPN and chart review:   The following significant findings were found:   Pt confirmed correct dose per calendar.   She has been having some nose bleeds and some blood tinged vaginal bleeding when wiping.   She states this all started when she increased her dose on Monday to the 5mg.   She has not had an appetite, did not have greens this past week, and has been fatigued.   She also reports a few days of diarrhea started on Monday,     Recommendation for patient's warfarin regimen:   Due to supratherapeutic INR, patient was instructed to SKIP their next 3 warfarin doses.    Recommended repeat INR in 3 days      Zoey Boyd, Tony, BCPS  Clinical Pharmacist - [facility]  Preferred Contact: Secure Messaging or In Basket Message
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
## 2025-04-12 PROBLEM — I82.C22: Chronic | Status: ACTIVE | Noted: 2025-04-05

## 2025-04-12 PROBLEM — N64.89 HEMATOMA OF RIGHT BREAST: Status: ACTIVE | Noted: 2025-04-12

## 2025-04-12 PROBLEM — E53.8 FOLATE DEFICIENCY: Chronic | Status: ACTIVE | Noted: 2020-01-10

## 2025-04-12 LAB
ABSOLUTE EOSINOPHIL (OHS): 0.23 K/UL
ABSOLUTE EOSINOPHIL (OHS): 0.25 K/UL
ABSOLUTE EOSINOPHIL (OHS): 0.31 K/UL
ABSOLUTE MONOCYTE (OHS): 0.63 K/UL (ref 0.3–1)
ABSOLUTE MONOCYTE (OHS): 0.87 K/UL (ref 0.3–1)
ABSOLUTE MONOCYTE (OHS): 0.99 K/UL (ref 0.3–1)
ABSOLUTE NEUTROPHIL COUNT (OHS): 2.8 K/UL (ref 1.8–7.7)
ABSOLUTE NEUTROPHIL COUNT (OHS): 3.43 K/UL (ref 1.8–7.7)
ABSOLUTE NEUTROPHIL COUNT (OHS): 3.78 K/UL (ref 1.8–7.7)
ALBUMIN SERPL BCP-MCNC: 3.6 G/DL (ref 3.5–5.2)
ALP SERPL-CCNC: 103 UNIT/L (ref 40–150)
ALT SERPL W/O P-5'-P-CCNC: 14 UNIT/L (ref 10–44)
ANION GAP (OHS): 7 MMOL/L (ref 8–16)
AST SERPL-CCNC: 19 UNIT/L (ref 11–45)
B-HCG UR QL: NEGATIVE
BASOPHILS # BLD AUTO: 0.03 K/UL
BASOPHILS # BLD AUTO: 0.04 K/UL
BASOPHILS # BLD AUTO: 0.05 K/UL
BASOPHILS NFR BLD AUTO: 0.4 %
BASOPHILS NFR BLD AUTO: 0.6 %
BASOPHILS NFR BLD AUTO: 0.8 %
BILIRUB SERPL-MCNC: 0.9 MG/DL (ref 0.1–1)
BILIRUB UR QL STRIP.AUTO: NEGATIVE
BUN SERPL-MCNC: 12 MG/DL (ref 6–20)
CALCIUM SERPL-MCNC: 9 MG/DL (ref 8.7–10.5)
CHLORIDE SERPL-SCNC: 106 MMOL/L (ref 95–110)
CLARITY UR: CLEAR
CO2 SERPL-SCNC: 23 MMOL/L (ref 23–29)
COLOR UR AUTO: YELLOW
CREAT SERPL-MCNC: 1.5 MG/DL (ref 0.5–1.4)
CTP QC/QA: YES
ERYTHROCYTE [DISTWIDTH] IN BLOOD BY AUTOMATED COUNT: 21.4 % (ref 11.5–14.5)
ERYTHROCYTE [DISTWIDTH] IN BLOOD BY AUTOMATED COUNT: 21.5 % (ref 11.5–14.5)
ERYTHROCYTE [DISTWIDTH] IN BLOOD BY AUTOMATED COUNT: 21.8 % (ref 11.5–14.5)
GFR SERPLBLD CREATININE-BSD FMLA CKD-EPI: 43 ML/MIN/1.73/M2
GLUCOSE SERPL-MCNC: 164 MG/DL (ref 70–110)
GLUCOSE UR QL STRIP: NEGATIVE
HCT VFR BLD AUTO: 24.4 % (ref 37–48.5)
HCT VFR BLD AUTO: 25.9 % (ref 37–48.5)
HCT VFR BLD AUTO: 26.6 % (ref 37–48.5)
HGB BLD-MCNC: 8 GM/DL (ref 12–16)
HGB BLD-MCNC: 8.4 GM/DL (ref 12–16)
HGB BLD-MCNC: 8.5 GM/DL (ref 12–16)
HGB UR QL STRIP: NEGATIVE
IMM GRANULOCYTES # BLD AUTO: 0.01 K/UL (ref 0–0.04)
IMM GRANULOCYTES # BLD AUTO: 0.02 K/UL (ref 0–0.04)
IMM GRANULOCYTES # BLD AUTO: 0.03 K/UL (ref 0–0.04)
IMM GRANULOCYTES NFR BLD AUTO: 0.2 % (ref 0–0.5)
IMM GRANULOCYTES NFR BLD AUTO: 0.3 % (ref 0–0.5)
IMM GRANULOCYTES NFR BLD AUTO: 0.4 % (ref 0–0.5)
INR PPP: 2.2 (ref 0.8–1.2)
KETONES UR QL STRIP: NEGATIVE
LEUKOCYTE ESTERASE UR QL STRIP: NEGATIVE
LYMPHOCYTES # BLD AUTO: 2.27 K/UL (ref 1–4.8)
LYMPHOCYTES # BLD AUTO: 2.67 K/UL (ref 1–4.8)
LYMPHOCYTES # BLD AUTO: 3.11 K/UL (ref 1–4.8)
MAGNESIUM SERPL-MCNC: 2 MG/DL (ref 1.6–2.6)
MCH RBC QN AUTO: 21.7 PG (ref 27–31)
MCH RBC QN AUTO: 21.9 PG (ref 27–31)
MCH RBC QN AUTO: 22.3 PG (ref 27–31)
MCHC RBC AUTO-ENTMCNC: 32 G/DL (ref 32–36)
MCHC RBC AUTO-ENTMCNC: 32.4 G/DL (ref 32–36)
MCHC RBC AUTO-ENTMCNC: 32.8 G/DL (ref 32–36)
MCV RBC AUTO: 67 FL (ref 82–98)
MCV RBC AUTO: 68 FL (ref 82–98)
MCV RBC AUTO: 68 FL (ref 82–98)
NITRITE UR QL STRIP: NEGATIVE
NUCLEATED RBC (/100WBC) (OHS): 1 /100 WBC
NUCLEATED RBC (/100WBC) (OHS): 1 /100 WBC
NUCLEATED RBC (/100WBC) (OHS): 2 /100 WBC
OHS QRS DURATION: 100 MS
OHS QRS DURATION: 96 MS
OHS QTC CALCULATION: 417 MS
OHS QTC CALCULATION: 480 MS
PH UR STRIP: 6 [PH]
PHOSPHATE SERPL-MCNC: 3.7 MG/DL (ref 2.7–4.5)
PLATELET # BLD AUTO: 419 K/UL (ref 150–450)
PLATELET # BLD AUTO: 446 K/UL (ref 150–450)
PLATELET # BLD AUTO: 499 K/UL (ref 150–450)
PMV BLD AUTO: 8.9 FL (ref 9.2–12.9)
PMV BLD AUTO: 9 FL (ref 9.2–12.9)
PMV BLD AUTO: 9.7 FL (ref 9.2–12.9)
POTASSIUM SERPL-SCNC: 3.6 MMOL/L (ref 3.5–5.1)
PROT SERPL-MCNC: 7.4 GM/DL (ref 6–8.4)
PROT UR QL STRIP: ABNORMAL
PROTHROMBIN TIME: 22.4 SECONDS (ref 9–12.5)
RBC # BLD AUTO: 3.58 M/UL (ref 4–5.4)
RBC # BLD AUTO: 3.84 M/UL (ref 4–5.4)
RBC # BLD AUTO: 3.92 M/UL (ref 4–5.4)
RELATIVE EOSINOPHIL (OHS): 2.8 %
RELATIVE EOSINOPHIL (OHS): 3.9 %
RELATIVE EOSINOPHIL (OHS): 4.5 %
RELATIVE LYMPHOCYTE (OHS): 32.7 % (ref 18–48)
RELATIVE LYMPHOCYTE (OHS): 38.1 % (ref 18–48)
RELATIVE LYMPHOCYTE (OHS): 41.7 % (ref 18–48)
RELATIVE MONOCYTE (OHS): 12.1 % (ref 4–15)
RELATIVE MONOCYTE (OHS): 12.5 % (ref 4–15)
RELATIVE MONOCYTE (OHS): 9.8 % (ref 4–15)
RELATIVE NEUTROPHIL (OHS): 43.6 % (ref 38–73)
RELATIVE NEUTROPHIL (OHS): 46.2 % (ref 38–73)
RELATIVE NEUTROPHIL (OHS): 49.4 % (ref 38–73)
RETICS/RBC NFR AUTO: 1.1 % (ref 0.5–2.5)
SODIUM SERPL-SCNC: 136 MMOL/L (ref 136–145)
SP GR UR STRIP: >=1.03
UROBILINOGEN UR STRIP-ACNC: NEGATIVE EU/DL
WBC # BLD AUTO: 6.41 K/UL (ref 3.9–12.7)
WBC # BLD AUTO: 6.94 K/UL (ref 3.9–12.7)
WBC # BLD AUTO: 8.17 K/UL (ref 3.9–12.7)

## 2025-04-12 PROCEDURE — 93010 ELECTROCARDIOGRAM REPORT: CPT | Mod: ,,, | Performed by: INTERNAL MEDICINE

## 2025-04-12 PROCEDURE — 25000003 PHARM REV CODE 250: Performed by: PHYSICIAN ASSISTANT

## 2025-04-12 PROCEDURE — 84100 ASSAY OF PHOSPHORUS: CPT

## 2025-04-12 PROCEDURE — 63600175 PHARM REV CODE 636 W HCPCS

## 2025-04-12 PROCEDURE — 96375 TX/PRO/DX INJ NEW DRUG ADDON: CPT

## 2025-04-12 PROCEDURE — 12000002 HC ACUTE/MED SURGE SEMI-PRIVATE ROOM

## 2025-04-12 PROCEDURE — 99223 1ST HOSP IP/OBS HIGH 75: CPT | Mod: ,,, | Performed by: STUDENT IN AN ORGANIZED HEALTH CARE EDUCATION/TRAINING PROGRAM

## 2025-04-12 PROCEDURE — 25000003 PHARM REV CODE 250

## 2025-04-12 PROCEDURE — 85610 PROTHROMBIN TIME: CPT

## 2025-04-12 PROCEDURE — 81003 URINALYSIS AUTO W/O SCOPE: CPT | Performed by: EMERGENCY MEDICINE

## 2025-04-12 PROCEDURE — 85045 AUTOMATED RETICULOCYTE COUNT: CPT

## 2025-04-12 PROCEDURE — 85025 COMPLETE CBC W/AUTO DIFF WBC: CPT

## 2025-04-12 PROCEDURE — 80053 COMPREHEN METABOLIC PANEL: CPT

## 2025-04-12 PROCEDURE — 63600175 PHARM REV CODE 636 W HCPCS: Performed by: PHYSICIAN ASSISTANT

## 2025-04-12 PROCEDURE — 93005 ELECTROCARDIOGRAM TRACING: CPT

## 2025-04-12 PROCEDURE — 83735 ASSAY OF MAGNESIUM: CPT

## 2025-04-12 PROCEDURE — 96376 TX/PRO/DX INJ SAME DRUG ADON: CPT

## 2025-04-12 RX ORDER — AMOXICILLIN 250 MG
1 CAPSULE ORAL DAILY PRN
Status: DISCONTINUED | OUTPATIENT
Start: 2025-04-12 | End: 2025-04-28 | Stop reason: HOSPADM

## 2025-04-12 RX ORDER — DIPHENHYDRAMINE HYDROCHLORIDE 50 MG/ML
25 INJECTION, SOLUTION INTRAMUSCULAR; INTRAVENOUS EVERY 6 HOURS PRN
Status: DISCONTINUED | OUTPATIENT
Start: 2025-04-12 | End: 2025-04-28 | Stop reason: HOSPADM

## 2025-04-12 RX ORDER — SODIUM CHLORIDE 0.9 % (FLUSH) 0.9 %
10 SYRINGE (ML) INJECTION EVERY 12 HOURS PRN
Status: DISCONTINUED | OUTPATIENT
Start: 2025-04-12 | End: 2025-04-28 | Stop reason: HOSPADM

## 2025-04-12 RX ORDER — AMLODIPINE BESYLATE 10 MG/1
10 TABLET ORAL DAILY
Status: DISCONTINUED | OUTPATIENT
Start: 2025-04-12 | End: 2025-04-28 | Stop reason: HOSPADM

## 2025-04-12 RX ORDER — HYDROMORPHONE HYDROCHLORIDE 2 MG/ML
2 INJECTION, SOLUTION INTRAMUSCULAR; INTRAVENOUS; SUBCUTANEOUS ONCE
Status: DISCONTINUED | OUTPATIENT
Start: 2025-04-12 | End: 2025-04-12

## 2025-04-12 RX ORDER — METOCLOPRAMIDE 5 MG/1
5 TABLET ORAL 4 TIMES DAILY
Status: DISCONTINUED | OUTPATIENT
Start: 2025-04-12 | End: 2025-04-28 | Stop reason: HOSPADM

## 2025-04-12 RX ORDER — HYDROMORPHONE HYDROCHLORIDE 2 MG/ML
2 INJECTION, SOLUTION INTRAMUSCULAR; INTRAVENOUS; SUBCUTANEOUS EVERY 4 HOURS PRN
Status: DISCONTINUED | OUTPATIENT
Start: 2025-04-12 | End: 2025-04-13

## 2025-04-12 RX ORDER — HYDROMORPHONE HCL IN 0.9% NACL 6 MG/30 ML
PATIENT CONTROLLED ANALGESIA SYRINGE INTRAVENOUS CONTINUOUS
Refills: 0 | Status: DISCONTINUED | OUTPATIENT
Start: 2025-04-12 | End: 2025-04-12

## 2025-04-12 RX ORDER — SUCRALFATE 1 G/1
1 TABLET ORAL
Status: DISCONTINUED | OUTPATIENT
Start: 2025-04-12 | End: 2025-04-28 | Stop reason: HOSPADM

## 2025-04-12 RX ORDER — GLUCAGON 1 MG
1 KIT INJECTION
Status: DISCONTINUED | OUTPATIENT
Start: 2025-04-12 | End: 2025-04-28 | Stop reason: HOSPADM

## 2025-04-12 RX ORDER — TIZANIDINE 2 MG/1
4 TABLET ORAL EVERY 8 HOURS PRN
Status: DISCONTINUED | OUTPATIENT
Start: 2025-04-12 | End: 2025-04-12

## 2025-04-12 RX ORDER — NALOXONE HCL 0.4 MG/ML
0.02 VIAL (ML) INJECTION
Status: DISCONTINUED | OUTPATIENT
Start: 2025-04-12 | End: 2025-04-12

## 2025-04-12 RX ORDER — DICYCLOMINE HYDROCHLORIDE 10 MG/1
10 CAPSULE ORAL DAILY PRN
Status: ON HOLD | COMMUNITY

## 2025-04-12 RX ORDER — MORPHINE SULFATE 15 MG/1
30 TABLET, FILM COATED, EXTENDED RELEASE ORAL 3 TIMES DAILY
Refills: 0 | Status: DISCONTINUED | OUTPATIENT
Start: 2025-04-12 | End: 2025-04-12

## 2025-04-12 RX ORDER — DIPHENHYDRAMINE HYDROCHLORIDE 50 MG/ML
25 INJECTION, SOLUTION INTRAMUSCULAR; INTRAVENOUS ONCE
Status: COMPLETED | OUTPATIENT
Start: 2025-04-12 | End: 2025-04-12

## 2025-04-12 RX ORDER — FOLIC ACID 1 MG/1
1 TABLET ORAL DAILY
Status: DISCONTINUED | OUTPATIENT
Start: 2025-04-12 | End: 2025-04-28 | Stop reason: HOSPADM

## 2025-04-12 RX ORDER — HYDROMORPHONE HYDROCHLORIDE 1 MG/ML
1 INJECTION, SOLUTION INTRAMUSCULAR; INTRAVENOUS; SUBCUTANEOUS EVERY 4 HOURS PRN
Status: DISCONTINUED | OUTPATIENT
Start: 2025-04-12 | End: 2025-04-12

## 2025-04-12 RX ORDER — FLUOXETINE HYDROCHLORIDE 20 MG/1
80 CAPSULE ORAL DAILY
Status: DISCONTINUED | OUTPATIENT
Start: 2025-04-12 | End: 2025-04-28 | Stop reason: HOSPADM

## 2025-04-12 RX ORDER — POTASSIUM CHLORIDE 20 MEQ/1
40 TABLET, EXTENDED RELEASE ORAL
Status: COMPLETED | OUTPATIENT
Start: 2025-04-12 | End: 2025-04-12

## 2025-04-12 RX ORDER — ACETAMINOPHEN 500 MG
1000 TABLET ORAL EVERY 8 HOURS
Status: DISCONTINUED | OUTPATIENT
Start: 2025-04-12 | End: 2025-04-13

## 2025-04-12 RX ORDER — IBUPROFEN 200 MG
24 TABLET ORAL
Status: DISCONTINUED | OUTPATIENT
Start: 2025-04-12 | End: 2025-04-28 | Stop reason: HOSPADM

## 2025-04-12 RX ORDER — NALOXONE HCL 0.4 MG/ML
0.02 VIAL (ML) INJECTION
Status: DISCONTINUED | OUTPATIENT
Start: 2025-04-12 | End: 2025-04-28 | Stop reason: HOSPADM

## 2025-04-12 RX ORDER — PANTOPRAZOLE SODIUM 40 MG/1
40 TABLET, DELAYED RELEASE ORAL 2 TIMES DAILY
Status: DISCONTINUED | OUTPATIENT
Start: 2025-04-12 | End: 2025-04-28 | Stop reason: HOSPADM

## 2025-04-12 RX ORDER — IBUPROFEN 200 MG
16 TABLET ORAL
Status: DISCONTINUED | OUTPATIENT
Start: 2025-04-12 | End: 2025-04-28 | Stop reason: HOSPADM

## 2025-04-12 RX ORDER — HYDROCODONE BITARTRATE AND ACETAMINOPHEN 10; 325 MG/1; MG/1
1 TABLET ORAL EVERY 6 HOURS PRN
Refills: 0 | Status: DISCONTINUED | OUTPATIENT
Start: 2025-04-12 | End: 2025-04-12

## 2025-04-12 RX ORDER — HYDROMORPHONE HYDROCHLORIDE 1 MG/ML
0.5 INJECTION, SOLUTION INTRAMUSCULAR; INTRAVENOUS; SUBCUTANEOUS ONCE
Status: COMPLETED | OUTPATIENT
Start: 2025-04-12 | End: 2025-04-12

## 2025-04-12 RX ORDER — HYDROMORPHONE HYDROCHLORIDE 2 MG/ML
2 INJECTION, SOLUTION INTRAMUSCULAR; INTRAVENOUS; SUBCUTANEOUS EVERY 6 HOURS PRN
Status: DISCONTINUED | OUTPATIENT
Start: 2025-04-12 | End: 2025-04-12

## 2025-04-12 RX ORDER — PRAZOSIN HYDROCHLORIDE 1 MG/1
1 CAPSULE ORAL NIGHTLY
Status: DISCONTINUED | OUTPATIENT
Start: 2025-04-12 | End: 2025-04-28 | Stop reason: HOSPADM

## 2025-04-12 RX ORDER — GABAPENTIN 300 MG/1
600 CAPSULE ORAL 3 TIMES DAILY
Status: DISCONTINUED | OUTPATIENT
Start: 2025-04-12 | End: 2025-04-28 | Stop reason: HOSPADM

## 2025-04-12 RX ORDER — METHOCARBAMOL 500 MG/1
500 TABLET, FILM COATED ORAL 4 TIMES DAILY
Status: DISCONTINUED | OUTPATIENT
Start: 2025-04-12 | End: 2025-04-28 | Stop reason: HOSPADM

## 2025-04-12 RX ADMIN — DIPHENHYDRAMINE HYDROCHLORIDE 25 MG: 50 INJECTION, SOLUTION INTRAMUSCULAR; INTRAVENOUS at 05:04

## 2025-04-12 RX ADMIN — PRAZOSIN HYDROCHLORIDE 1 MG: 1 CAPSULE ORAL at 09:04

## 2025-04-12 RX ADMIN — ACETAMINOPHEN 1000 MG: 500 TABLET ORAL at 01:04

## 2025-04-12 RX ADMIN — HYDROMORPHONE HYDROCHLORIDE 1 MG: 0.5 INJECTION, SOLUTION INTRAMUSCULAR; INTRAVENOUS; SUBCUTANEOUS at 10:04

## 2025-04-12 RX ADMIN — HYDROMORPHONE HYDROCHLORIDE 2 MG: 2 INJECTION, SOLUTION INTRAMUSCULAR; INTRAVENOUS; SUBCUTANEOUS at 05:04

## 2025-04-12 RX ADMIN — POTASSIUM CHLORIDE 40 MEQ: 1500 TABLET, EXTENDED RELEASE ORAL at 01:04

## 2025-04-12 RX ADMIN — METOCLOPRAMIDE 5 MG: 5 TABLET ORAL at 12:04

## 2025-04-12 RX ADMIN — METHOCARBAMOL 500 MG: 500 TABLET ORAL at 04:04

## 2025-04-12 RX ADMIN — PRAZOSIN HYDROCHLORIDE 1 MG: 1 CAPSULE ORAL at 03:04

## 2025-04-12 RX ADMIN — GABAPENTIN 600 MG: 300 CAPSULE ORAL at 09:04

## 2025-04-12 RX ADMIN — ACETAMINOPHEN 1000 MG: 500 TABLET ORAL at 09:04

## 2025-04-12 RX ADMIN — METOCLOPRAMIDE 5 MG: 5 TABLET ORAL at 09:04

## 2025-04-12 RX ADMIN — AMLODIPINE BESYLATE 10 MG: 10 TABLET ORAL at 08:04

## 2025-04-12 RX ADMIN — POTASSIUM CHLORIDE 40 MEQ: 1500 TABLET, EXTENDED RELEASE ORAL at 05:04

## 2025-04-12 RX ADMIN — DIPHENHYDRAMINE HYDROCHLORIDE 25 MG: 50 INJECTION, SOLUTION INTRAMUSCULAR; INTRAVENOUS at 10:04

## 2025-04-12 RX ADMIN — METOCLOPRAMIDE 5 MG: 5 TABLET ORAL at 08:04

## 2025-04-12 RX ADMIN — HYDROMORPHONE HYDROCHLORIDE 0.5 MG: 0.5 INJECTION, SOLUTION INTRAMUSCULAR; INTRAVENOUS; SUBCUTANEOUS at 09:04

## 2025-04-12 RX ADMIN — METOCLOPRAMIDE 5 MG: 5 TABLET ORAL at 04:04

## 2025-04-12 RX ADMIN — DIPHENHYDRAMINE HYDROCHLORIDE 25 MG: 50 INJECTION, SOLUTION INTRAMUSCULAR; INTRAVENOUS at 11:04

## 2025-04-12 RX ADMIN — PANTOPRAZOLE SODIUM 40 MG: 40 TABLET, DELAYED RELEASE ORAL at 08:04

## 2025-04-12 RX ADMIN — Medication: at 12:04

## 2025-04-12 RX ADMIN — HYDROCODONE BITARTRATE AND ACETAMINOPHEN 1 TABLET: 10; 325 TABLET ORAL at 01:04

## 2025-04-12 RX ADMIN — FLUOXETINE HYDROCHLORIDE 80 MG: 20 CAPSULE ORAL at 08:04

## 2025-04-12 RX ADMIN — METHOCARBAMOL 500 MG: 500 TABLET ORAL at 09:04

## 2025-04-12 RX ADMIN — GABAPENTIN 600 MG: 300 CAPSULE ORAL at 08:04

## 2025-04-12 RX ADMIN — HYDROMORPHONE HYDROCHLORIDE 2 MG: 2 INJECTION, SOLUTION INTRAMUSCULAR; INTRAVENOUS; SUBCUTANEOUS at 10:04

## 2025-04-12 RX ADMIN — FOLIC ACID 1 MG: 1 TABLET ORAL at 08:04

## 2025-04-12 RX ADMIN — PANTOPRAZOLE SODIUM 40 MG: 40 TABLET, DELAYED RELEASE ORAL at 09:04

## 2025-04-12 RX ADMIN — TIZANIDINE 4 MG: 2 TABLET ORAL at 01:04

## 2025-04-12 RX ADMIN — SUCRALFATE 1 G: 1 TABLET ORAL at 12:04

## 2025-04-12 RX ADMIN — DIPHENHYDRAMINE HYDROCHLORIDE 25 MG: 50 INJECTION, SOLUTION INTRAMUSCULAR; INTRAVENOUS at 01:04

## 2025-04-12 RX ADMIN — GABAPENTIN 600 MG: 300 CAPSULE ORAL at 03:04

## 2025-04-12 RX ADMIN — SUCRALFATE 1 G: 1 TABLET ORAL at 04:04

## 2025-04-12 RX ADMIN — TIZANIDINE 4 MG: 2 TABLET ORAL at 09:04

## 2025-04-12 RX ADMIN — METHOCARBAMOL 500 MG: 500 TABLET ORAL at 12:04

## 2025-04-12 RX ADMIN — SUCRALFATE 1 G: 1 TABLET ORAL at 08:04

## 2025-04-12 RX ADMIN — SUCRALFATE 1 G: 1 TABLET ORAL at 09:04

## 2025-04-12 RX ADMIN — HYDROMORPHONE HYDROCHLORIDE 2 MG: 2 INJECTION, SOLUTION INTRAMUSCULAR; INTRAVENOUS; SUBCUTANEOUS at 03:04
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
## 2025-04-12 NOTE — ASSESSMENT & PLAN NOTE
Follows with Dr. Soni. Reports taking MS Contin 30mg TID, Percocet 10-325mg q6h, tizanidine 4mg q8h PRN, and gabapentin 600mg TID.\  Reports that due to gastritis, she is not currently taking Hydrea.    - Continue multimodal pain regimen
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
## 2025-04-12 NOTE — ED NOTES
Assumed care for pt after recieving report from FRANCIS Baer. Pt. resting in bed. Pt. offered bathroom assistance and denies need at this time. Explanation of care/wait provided. Pt verbalizes no needs at this time. Bed in low, locked position with rails up and call bell in reach. Pt's white board updated with today's care team and plan.
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
## 2025-04-12 NOTE — ASSESSMENT & PLAN NOTE
Patient's most recent potassium results are listed below.   Recent Labs     04/11/25  1741 04/12/25  0412   K 3.2* 3.6     EKG showed normal sinus rhythm.    Plan  - Replete potassium per protocol  - Monitor potassium Daily  - Patient's hypokalemia is stable

## 2025-04-12 NOTE — SUBJECTIVE & OBJECTIVE
No current facility-administered medications on file prior to encounter.     Current Outpatient Medications on File Prior to Encounter   Medication Sig    acetaminophen (TYLENOL) 325 MG tablet Take 2 tablets (650 mg total) by mouth every 8 (eight) hours as needed for Pain.    albuterol (VENTOLIN HFA) 90 mcg/actuation inhaler Inhale 2 puffs into the lungs every 6 (six) hours as needed for Wheezing or Shortness of Breath. Rescue    amLODIPine (NORVASC) 10 MG tablet Take 1 tablet (10 mg total) by mouth once daily.    ascorbic acid (VITAMIN C ORAL) Take 1 capsule by mouth 2 (two) times a day.    FLUoxetine 40 MG capsule Take 2 capsules (80 mg total) by mouth once daily.    fluticasone propionate (FLONASE) 50 mcg/actuation nasal spray INSTILL 1 SPRAY INTO EACH NOSTRIL EVERY DAY    folic acid (FOLVITE) 1 MG tablet Take 1 tablet (1 mg total) by mouth once daily.    gabapentin (NEURONTIN) 300 MG capsule Take 2 capsules (600 mg total) by mouth 3 (three) times daily.    HYDROcodone-acetaminophen (NORCO)  mg per tablet Take 1 tablet by mouth every 4 to 6 hours as needed for Pain.    hydroxyurea (HYDREA) 500 mg Cap Take 2 capsules (1,000 mg total) by mouth once daily.    metoclopramide HCl (REGLAN) 5 MG tablet Take 5 mg by mouth 4 (four) times daily.    morphine (MS CONTIN) 30 MG 12 hr tablet Take 1 tablet (30 mg total) by mouth 3 (three) times daily.    naloxone (NARCAN) 4 mg/actuation Spry 4mg by nasal route as needed for opioid overdose; may repeat every 2-3 minutes in alternating nostrils until medical help arrives. Call 911    pantoprazole (PROTONIX) 40 MG tablet Take 1 tablet (40 mg total) by mouth 2 (two) times daily.    prazosin (MINIPRESS) 1 MG Cap Take 1 capsule (1 mg total) by mouth every evening.    senna-docusate 8.6-50 mg (PERICOLACE) 8.6-50 mg per tablet Take 1 tablet by mouth daily as needed for Constipation.    sucralfate (CARAFATE) 1 gram tablet Take 1 tablet (1 g total) by mouth 4 (four) times daily  before meals and nightly.    tiZANidine (ZANAFLEX) 4 MG tablet Take 1 tablet (4 mg total) by mouth every 8 (eight) hours as needed (Muscle spasms).    warfarin (COUMADIN) 2.5 MG tablet Take 1 tablet (2.5 mg total) by mouth Daily.       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    Asthma     Avascular necrosis of femur     Depression     History of pulmonary embolism     Hypertension     Morbid obesity     Opioid dependence 2017    Pneumonia due to Streptococcus pneumoniae 2017    Right lower lobe pneumonia 2017    Sepsis due to Streptococcus pneumoniae 2017    Sickle cell-beta thalassemia disease with pain     Trigeminal neuralgia      Past Surgical History:   Procedure Laterality Date     SECTION      ESOPHAGOGASTRODUODENOSCOPY N/A 2024    Procedure: EGD (ESOPHAGOGASTRODUODENOSCOPY);  Surgeon: Allen Marshall MD;  Location: [address];  Service: Gastroenterology;  Laterality: N/A;    TONSILLECTOMY      TUBAL LIGATION       Family History       Problem Relation (Age of Onset)    Diabetes Mother    Heart disease Mother, Father          Tobacco Use    Smoking status: Never    Smokeless tobacco: Never   Substance and Sexual Activity    Alcohol use: No    Drug use: Yes     Types: Marijuana     Comment: periodically     Sexual activity: Not Currently     Birth control/protection: See Surgical Hx     Review of Systems   Respiratory:  Negative for shortness of breath.    Gastrointestinal:  Positive for abdominal pain.   Musculoskeletal:         Left arm pain    Skin:         Right breast pain      Objective:     Vital Signs (Most Recent):  Temp: 98.9 °F (37.2  °C) (04/11/25 2300)  Pulse: 84 (04/11/25 2300)  Resp: 19 (04/11/25 2300)  BP: (!) 158/100 (04/11/25 2300)  SpO2: 97 % (04/11/25 2300) Vital Signs (24h Range):  Temp:  [98 °F (36.7 °C)-99.7 °F (37.6 °C)] 98.9 °F (37.2 °C)  Pulse:  [82-94] 84  Resp:  [16-19] 19  SpO2:  [97 %-100 %] 97 %  BP: (142-158)/() 158/100     Weight: 93.9 kg (207 lb)  Body mass index is 37.86 kg/m².     Physical Exam  Vitals reviewed.   Constitutional:       General: She is not in acute distress.  HENT:      Head: Normocephalic and atraumatic.      Nose: Nose normal.   Eyes:      General:         Right eye: No discharge.         Left eye: No discharge.   Neck:      Comments: Left neck tenderness. No midline cervical tenderness   Cardiovascular:      Rate and Rhythm: Normal rate and regular rhythm.      Comments: 2+ pulses radially and distal lower extremities   Pulmonary:      Effort: Pulmonary effort is normal. No respiratory distress.      Breath sounds: No stridor.   Abdominal:      Comments: Soft, ND, NT  Some left sided discomfort    Musculoskeletal:         General: Normal range of motion.      Cervical back: Normal range of motion.      Comments: Left shoulder abrasion  Left forearm hematoma and swelling   Various abrasions on knees    Skin:     General: Skin is warm and dry.      Capillary Refill: Capillary refill takes 2 to 3 seconds.      Comments: Right breast hematoma with tenderness, stable    Neurological:      General: No focal deficit present.      Mental Status: She is alert and oriented to person, place, and time.      Comments: AOx3  Moving all extremities  CN grossly intact  NO FND  No midline spine tenderness      Psychiatric:         Mood and Affect: Mood normal.         Behavior: Behavior normal.            I have reviewed all pertinent lab results within the past 24 hours.    Significant Diagnostics:  I have reviewed all pertinent imaging results/findings within the past 24 hours.

## 2025-04-12 NOTE — SUBJECTIVE & OBJECTIVE
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    Asthma     Avascular necrosis of femur     Depression     History of pulmonary embolism     Hypertension     Morbid obesity     Opioid dependence 2017    Pneumonia due to Streptococcus pneumoniae 2017    Right lower lobe pneumonia 2017    Sepsis due to Streptococcus pneumoniae 2017    Sickle cell-beta thalassemia disease with pain     Trigeminal neuralgia        Past Surgical History:   Procedure Laterality Date     SECTION      ESOPHAGOGASTRODUODENOSCOPY N/A 2024    Procedure: EGD (ESOPHAGOGASTRODUODENOSCOPY);  Surgeon: Allen Marshall MD;  Location: [address]);  Service: Gastroenterology;  Laterality: N/A;    TONSILLECTOMY      TUBAL LIGATION         Review of patient's allergies indicates:   Allergen Reactions    Cat dander Anaphylaxis, Itching and Shortness Of Breath    Nsaids (non-steroidal anti-inflammatory drug) Itching and Anaphylaxis    Latex Rash       No current facility-administered medications on file prior to encounter.     Current Outpatient Medications on File Prior to Encounter   Medication Sig    acetaminophen (TYLENOL) 325 MG tablet Take 2 tablets (650 mg total) by mouth every 8 (eight) hours as needed for Pain.    albuterol (VENTOLIN HFA) 90 mcg/actuation inhaler Inhale 2 puffs into the lungs every 6 (six) hours as needed for Wheezing or Shortness of Breath. Rescue    amLODIPine (NORVASC) 10 MG tablet Take 1 tablet (10 mg total) by mouth once daily.    ascorbic acid (VITAMIN C ORAL) Take 1 capsule by mouth 2 (two) times a day.    FLUoxetine 40 MG capsule Take 2 capsules (80 mg total) by mouth once daily.    fluticasone propionate (FLONASE) 50 mcg/actuation nasal spray INSTILL 1 SPRAY INTO EACH NOSTRIL EVERY DAY    folic acid (FOLVITE) 1 MG tablet Take 1 tablet (1 mg total) by mouth once daily.     gabapentin (NEURONTIN) 300 MG capsule Take 2 capsules (600 mg total) by mouth 3 (three) times daily.    HYDROcodone-acetaminophen (NORCO)  mg per tablet Take 1 tablet by mouth every 4 to 6 hours as needed for Pain.    hydroxyurea (HYDREA) 500 mg Cap Take 2 capsules (1,000 mg total) by mouth once daily.    metoclopramide HCl (REGLAN) 5 MG tablet Take 5 mg by mouth 4 (four) times daily.    morphine (MS CONTIN) 30 MG 12 hr tablet Take 1 tablet (30 mg total) by mouth 3 (three) times daily.    naloxone (NARCAN) 4 mg/actuation Spry 4mg by nasal route as needed for opioid overdose; may repeat every 2-3 minutes in alternating nostrils until medical help arrives. Call 911    pantoprazole (PROTONIX) 40 MG tablet Take 1 tablet (40 mg total) by mouth 2 (two) times daily.    prazosin (MINIPRESS) 1 MG Cap Take 1 capsule (1 mg total) by mouth every evening.    senna-docusate 8.6-50 mg (PERICOLACE) 8.6-50 mg per tablet Take 1 tablet by mouth daily as needed for Constipation.    sucralfate (CARAFATE) 1 gram tablet Take 1 tablet (1 g total) by mouth 4 (four) times daily before meals and nightly.    tiZANidine (ZANAFLEX) 4 MG tablet Take 1 tablet (4 mg total) by mouth every 8 (eight) hours as needed (Muscle spasms).    warfarin (COUMADIN) 2.5 MG tablet Take 1 tablet (2.5 mg total) by mouth Daily.     Family History       Problem Relation (Age of Onset)    Diabetes Mother    Heart disease Mother, Father          Tobacco Use    Smoking status: Never    Smokeless tobacco: Never   Substance and Sexual Activity    Alcohol use: No    Drug use: Yes     Types: Marijuana     Comment: periodically     Sexual activity: Not Currently     Birth control/protection: See Surgical Hx     Review of Systems   Constitutional:  Negative for chills and fever.   Respiratory:  Negative for cough and shortness of breath.    Cardiovascular:  Positive for chest pain. Negative for palpitations and leg swelling.   Gastrointestinal:  Negative for abdominal  distention, abdominal pain, diarrhea, nausea and vomiting.   Genitourinary:  Negative for flank pain.   Musculoskeletal:  Positive for arthralgias and myalgias.   Skin:  Negative for wound.   Neurological:  Negative for dizziness and headaches.   Psychiatric/Behavioral:  Positive for dysphoric mood. Negative for confusion and decreased concentration.      Objective:     Vital Signs (Most Recent):  Temp: 98.9 °F (37.2 °C) (04/11/25 2300)  Pulse: 84 (04/11/25 2300)  Resp: 19 (04/11/25 2300)  BP: (!) 158/100 (04/11/25 2300)  SpO2: 97 % (04/11/25 2300) Vital Signs (24h Range):  Temp:  [98 °F (36.7 °C)-99.7 °F (37.6 °C)] 98.9 °F (37.2 °C)  Pulse:  [82-94] 84  Resp:  [16-19] 19  SpO2:  [97 %-100 %] 97 %  BP: (142-158)/() 158/100     Weight: 93.9 kg (207 lb)  Body mass index is 37.86 kg/m².     Physical Exam  Vitals and nursing note reviewed.   Constitutional:       General: She is in acute distress.      Appearance: She is obese.   HENT:      Head: Normocephalic and atraumatic.      Mouth/Throat:      Mouth: Mucous membranes are moist.      Pharynx: Oropharynx is clear.   Eyes:      General: No scleral icterus.     Conjunctiva/sclera: Conjunctivae normal.   Cardiovascular:      Rate and Rhythm: Normal rate and regular rhythm.      Pulses: Normal pulses.      Heart sounds: Normal heart sounds.   Pulmonary:      Effort: Pulmonary effort is normal.      Breath sounds: Normal breath sounds.   Chest:      Comments: Right breast hematoma  Abdominal:      General: There is no distension.      Tenderness: There is no abdominal tenderness. There is no guarding.   Musculoskeletal:      Cervical back: Normal range of motion. Tenderness present.      Right lower leg: No edema.      Left lower leg: No edema.      Comments: Left forearm hematoma   Skin:     General: Skin is warm and dry.   Neurological:      General: No focal deficit present.      Mental Status: She is alert. Mental status is at baseline.      Cranial Nerves: No  cranial nerve deficit.   Psychiatric:         Mood and Affect: Mood normal.         Behavior: Behavior normal.                Significant Labs: All pertinent labs within the past 24 hours have been reviewed.  CBC:   Recent Labs   Lab 04/11/25  1741   WBC 11.69   HGB 9.9*   HCT 29.6*   *     CMP:   Recent Labs   Lab 04/11/25  1741   *   K 3.2*      CO2 22*   BUN 11   CREATININE 1.4   CALCIUM 9.6   ALBUMIN 4.0   BILITOT 0.5   ALKPHOS 110   AST 21   ALT 16   ANIONGAP 8     Coagulation:   Recent Labs   Lab 04/11/25  1741   INR 8.3*   APTT 61.2*       Significant Imaging: I have reviewed all pertinent imaging results/findings within the past 24 hours.

## 2025-04-12 NOTE — SUBJECTIVE & OBJECTIVE
Interval History: NAOE, in some pain this am but overall good spirits.  Hgt 8.5 from 9.9.  HR WNL, some htn. INR 2.2    Medications:  Continuous Infusions:   hydromorphone in 0.9 % NaCl 6 mg/30 ml   Intravenous Continuous         Scheduled Meds:   amLODIPine  10 mg Oral Daily    FLUoxetine  80 mg Oral Daily    folic acid  1 mg Oral Daily    gabapentin  600 mg Oral TID    metoclopramide HCl  5 mg Oral QID    pantoprazole  40 mg Oral BID    prazosin  1 mg Oral QHS    sucralfate  1 g Oral QID (AC & HS)     PRN Meds:  Current Facility-Administered Medications:     dextrose 50%, 12.5 g, Intravenous, PRN    dextrose 50%, 25 g, Intravenous, PRN    glucagon (human recombinant), 1 mg, Intramuscular, PRN    glucose, 16 g, Oral, PRN    glucose, 24 g, Oral, PRN    naloxone, 0.02 mg, Intravenous, PRN    naloxone, 0.02 mg, Intravenous, PRN    senna-docusate, 1 tablet, Oral, Daily PRN    sodium chloride 0.9%, 10 mL, Intravenous, Q12H PRN    tiZANidine, 4 mg, Oral, Q8H PRN     Review of patient's allergies indicates:   Allergen Reactions    Cat dander Anaphylaxis, Itching and Shortness Of Breath    Nsaids (non-steroidal anti-inflammatory drug) Itching and Anaphylaxis    Latex Rash     Objective:     Vital Signs (Most Recent):  Temp: 97 °F (36.1 °C) (04/12/25 0703)  Pulse: 86 (04/12/25 0703)  Resp: 20 (04/12/25 0703)  BP: 135/83 (04/12/25 0703)  SpO2: 97 % (04/12/25 0703) Vital Signs (24h Range):  Temp:  [97 °F (36.1 °C)-99.7 °F (37.6 °C)] 97 °F (36.1 °C)  Pulse:  [82-94] 86  Resp:  [16-20] 20  SpO2:  [97 %-100 %] 97 %  BP: (111-186)/() 135/83     Weight: 93.9 kg (207 lb)  Body mass index is 37.86 kg/m².    Intake/Output - Last 3 Shifts       None             Physical Exam  Vitals reviewed.   Constitutional:       General: She is not in acute distress.  HENT:      Head: Normocephalic and atraumatic.      Nose: Nose normal.   Eyes:      General:         Right eye: No discharge.         Left eye: No discharge.   Neck:       Comments: Left neck tenderness. No midline cervical tenderness   Cardiovascular:      Rate and Rhythm: Normal rate and regular rhythm.      Comments: 2+ pulses radially and distal lower extremities   Pulmonary:      Effort: Pulmonary effort is normal. No respiratory distress.      Breath sounds: No stridor.   Abdominal:      Comments: Soft, ND, NT  Some left sided discomfort    Musculoskeletal:         General: Normal range of motion.      Cervical back: Normal range of motion.      Comments: Left shoulder abrasion  Left forearm hematoma and swelling   Various abrasions on knees    Skin:     General: Skin is warm and dry.      Capillary Refill: Capillary refill takes 2 to 3 seconds.      Comments: Right breast hematoma with tenderness, stable    Neurological:      General: No focal deficit present.      Mental Status: She is alert and oriented to person, place, and time.      Comments: AOx3  Moving all extremities  CN grossly intact  NO FND  No midline spine tenderness      Psychiatric:         Mood and Affect: Mood normal.         Behavior: Behavior normal.      Significant Labs:  I have reviewed all pertinent lab results within the past 24 hours.  CBC:   Recent Labs   Lab 04/12/25  0412   WBC 8.17   RBC 3.92*   HGB 8.5*   HCT 26.6*      MCV 68*   MCH 21.7*   MCHC 32.0     CMP:   Recent Labs   Lab 04/12/25  0412   CALCIUM 9.0   ALBUMIN 3.6      K 3.6   CO2 23      BUN 12   CREATININE 1.5*   ALKPHOS 103   ALT 14   AST 19   BILITOT 0.9       Significant Diagnostics:  I have reviewed all pertinent imaging results/findings within the past 24 hours.
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
## 2025-04-12 NOTE — ED NOTES
Assumed care of patient at this time. Pt placed in hospital gown and currently lying in bed, provided pt with warm blanket. Pt denied restroom use. No other complaints from pt at this time.       Review of patient's allergies indicates:   Allergen Reactions    Cat dander Anaphylaxis, Itching and Shortness Of Breath    Nsaids (non-steroidal anti-inflammatory drug) Itching and Anaphylaxis    Latex Rash     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/2017    Asthma     Avascular necrosis of femur     Depression     History of pulmonary embolism     Hypertension     Morbid obesity     Opioid dependence 04/16/2017    Pneumonia due to Streptococcus pneumoniae 04/25/2017    Right lower lobe pneumonia 04/29/2017    Sepsis due to Streptococcus pneumoniae 04/25/2017    Sickle cell-beta thalassemia disease with pain     Trigeminal neuralgia
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
## 2025-04-12 NOTE — ASSESSMENT & PLAN NOTE
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 Had been on apixiban and then Lovenox due to insurance coverage issues.  Now on coumadin with supratherapeutic INR.    - Hold coumadin with INR 8.3  - INR daily        As seen on US from 4/4/25.    - Hold coumadin in setting of supratherapeutic INR
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
## 2025-04-12 NOTE — ASSESSMENT & PLAN NOTE
Patient's most recent potassium results are listed below.   Recent Labs     04/11/25  1741   K 3.2*     EKG showed normal sinus rhythm.    Plan  - Replete potassium per protocol  - Monitor potassium Daily  - Patient's hypokalemia is stable

## 2025-04-12 NOTE — ASSESSMENT & PLAN NOTE
Patient's hemorrhage is due to trauma/laceration, this bleeding is associated with an anticoagulant, the anticoagulant is Select Anticoagulant(s): Warfarin/Coumadin. Patients most recent Hgb, Hct, platelets, and INR are listed below.  Recent Labs     04/10/25  1532 04/11/25  1741 04/12/25  0412 04/12/25  0946   HGB  --  9.9* 8.5* 8.4*   HCT  --  29.6* 26.6* 25.9*   PLT  --  608* 446 499*   INR 7.9* 8.3* 2.2*  --      Plan  - Will trend hemoglobin/hematocrit Daily  - Will monitor and correct any coagulation defects  - Will transfuse if Hgb is <7g/dl (<8g/dl in cases of active ACS) or if patient has rapid bleeding leading to hemodynamic instability  - Compression wrap placed in the ED

## 2025-04-12 NOTE — PLAN OF CARE
04/12/25 0810   Post-Acute Status   Post-Acute Authorization Home Health   Home Health Status Referrals Sent   Discharge Delays None known at this time   Discharge Plan   Discharge Plan A Home Health     Met with patient to review discharge recommendation of home health and is agreeable to plan    Patient/family provided list of facilities in-network with patient's payor plan. Providers that are owned, operated, or affiliated with [facility] are included on the list.     Notified that referral sent to below listed facilities from in-network list based on proximity to home/family support:   [facility]  2.  3.  4.  5. (can send more than 5)    Patient/family instructed to identify preference.    Preferred Facility: (if more than 1, listed in order of descending preference)  No preference    Patient has declined to select a preferred provider and elects placement with the first accepting in network provider that is available to provide services as ordered by the physician       If an additional preferred facility not listed above is identified, additional referral to be sent. If above facilities unable to accept, will send additional referrals to in-network providers.     Discharge Plan A and Plan B have been determined by review of patient's clinical status, future medical and therapeutic needs, and coverage/benefits for post-acute care in coordination with multidisciplinary team members.    Mackenzie Wong, MADELINE, MSW, LMSW, RSW   Case Management  [facility]  Email: [email or web address]
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
## 2025-04-12 NOTE — SUBJECTIVE & OBJECTIVE
Interval History: Given prn dilaudid while awaiting PCA pump. Continued compression per general surgery. Continue pain control. INR 2.2, pharm consulted for warfarin management.    Review of Systems   Constitutional:  Negative for chills and fever.   Respiratory:  Negative for cough and shortness of breath.    Cardiovascular:  Positive for chest pain. Negative for palpitations and leg swelling.   Gastrointestinal:  Negative for abdominal distention, abdominal pain, diarrhea, nausea and vomiting.   Genitourinary:  Negative for flank pain.   Musculoskeletal:  Positive for arthralgias and myalgias.   Skin:  Negative for wound.   Neurological:  Negative for dizziness and headaches.   Psychiatric/Behavioral:  Positive for dysphoric mood. Negative for confusion and decreased concentration.      Objective:     Vital Signs (Most Recent):  Temp: 98.1 °F (36.7 °C) (04/12/25 1140)  Pulse: 82 (04/12/25 1140)  Resp: 17 (04/12/25 1140)  BP: 103/64 (04/12/25 1140)  SpO2: 100 % (04/12/25 1140) Vital Signs (24h Range):  Temp:  [97 °F (36.1 °C)-99.7 °F (37.6 °C)] 98.1 °F (36.7 °C)  Pulse:  [82-94] 82  Resp:  [16-20] 17  SpO2:  [97 %-100 %] 100 %  BP: (103-186)/() 103/64     Weight: 93.9 kg (207 lb)  Body mass index is 37.86 kg/m².  No intake or output data in the 24 hours ending 04/12/25 1224      Physical Exam  Vitals reviewed.   Constitutional:       General: She is not in acute distress.  HENT:      Head: Normocephalic and atraumatic.      Nose: Nose normal.   Eyes:      General:         Right eye: No discharge.         Left eye: No discharge.   Neck:      Comments: Left neck tenderness. No midline cervical tenderness   Cardiovascular:      Rate and Rhythm: Normal rate and regular rhythm.      Comments: 2+ pulses radially and distal lower extremities   Pulmonary:      Effort: Pulmonary effort is normal. No respiratory distress.      Breath sounds: No stridor.   Abdominal:      Comments: Soft, ND, NT  Some left sided  discomfort    Musculoskeletal:         General: Normal range of motion.      Cervical back: Normal range of motion.      Comments: Left shoulder abrasion  Left forearm hematoma and swelling   Various abrasions on knees    Skin:     General: Skin is warm and dry.      Capillary Refill: Capillary refill takes 2 to 3 seconds.      Comments: Right breast hematoma with tenderness, stable    Neurological:      General: No focal deficit present.      Mental Status: She is alert and oriented to person, place, and time.      Comments: AOx3  Moving all extremities  CN grossly intact  No midline spine tenderness      Psychiatric:         Mood and Affect: Mood normal.         Behavior: Behavior normal.               Significant Labs: All pertinent labs within the past 24 hours have been reviewed.    Significant Imaging: I have reviewed all pertinent imaging results/findings within the past 24 hours.

## 2025-04-12 NOTE — PHARMACY MED REC
"              .        Admission Medication History     The home medication history was taken by Radha Benson.    You may go to "Admission" then "Reconcile Home Medications" tabs to review and/or act upon these items.     The home medication list has been updated by the Pharmacy department.   Please read ALL comments highlighted in yellow.   Please address this information as you see fit.    Feel free to contact us if you have any questions or require assistance.      The medications listed below were removed from the home medication list. Please reorder if appropriate:  Patient reports no longer taking the following medication(s):  Metoclopramide 5 mg  Morphine 30 mg  Sucralfate 1 gram    Medications listed below were obtained from: Patient/family and Analytic software- [employer]  Current Outpatient Medications on File Prior to Encounter   Medication Sig    acetaminophen (TYLENOL) 325 MG tablet Take 2 tablets (650 mg total) by mouth every 8 (eight) hours as needed for Pain.    albuterol (VENTOLIN HFA) 90 mcg/actuation inhaler Inhale 2 puffs into the lungs every 6 (six) hours as needed for Wheezing or Shortness of Breath. Rescue    amLODIPine (NORVASC) 10 MG tablet Take 1 tablet (10 mg total) by mouth once daily.    ascorbic acid (VITAMIN C ORAL) Take 1 capsule by mouth Daily.    dicyclomine (BENTYL) 10 MG capsule Take 10 mg by mouth daily as needed.    FLUoxetine 40 MG capsule Take 2 capsules (80 mg total) by mouth once daily.    fluticasone propionate (FLONASE) 50 mcg/actuation nasal spray INSTILL 1 spray by Each Nostril route daily as needed.    folic acid (FOLVITE) 1 MG tablet Take 1 tablet (1 mg total) by mouth once daily.    gabapentin (NEURONTIN) 300 MG capsule Take 2-3 capsules by mouth 3 (three) times daily.    HYDROcodone-acetaminophen (NORCO)  mg per tablet Take 1 tablet by mouth every 4 to 6 hours as needed for Pain.    hydroxyurea (HYDREA) 500 mg Cap Take 2 capsules (1,000 mg total) by mouth once " daily.    naloxone (NARCAN) 4 mg/actuation Spry 4mg by nasal route as needed for opioid overdose; may repeat every 2-3 minutes in alternating nostrils until medical help arrives. Call 911    pantoprazole (PROTONIX) 40 MG tablet Take 40 mg by mouth once daily.    prazosin (MINIPRESS) 1 MG Cap Take 1 capsule (1 mg total) by mouth every evening.    senna-docusate 8.6-50 mg (PERICOLACE) 8.6-50 mg per tablet Take 1 tablet by mouth daily as needed for Constipation.    tiZANidine (ZANAFLEX) 4 MG tablet Take 1 tablet (4 mg total) by mouth every 8 (eight) hours as needed (Muscle spasms).    warfarin (COUMADIN) 2.5 MG tablet Take 1 tablet (2.5 mg total) by mouth Daily.           Radha Benson  [id number]
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
## 2025-04-13 LAB
ABSOLUTE EOSINOPHIL (OHS): 0.38 K/UL
ABSOLUTE EOSINOPHIL (OHS): 0.4 K/UL
ABSOLUTE EOSINOPHIL (OHS): 0.4 K/UL
ABSOLUTE EOSINOPHIL (OHS): 0.43 K/UL
ABSOLUTE MONOCYTE (OHS): 0.87 K/UL (ref 0.3–1)
ABSOLUTE MONOCYTE (OHS): 0.9 K/UL (ref 0.3–1)
ABSOLUTE MONOCYTE (OHS): 0.94 K/UL (ref 0.3–1)
ABSOLUTE MONOCYTE (OHS): 0.95 K/UL (ref 0.3–1)
ABSOLUTE NEUTROPHIL COUNT (OHS): 2.4 K/UL (ref 1.8–7.7)
ABSOLUTE NEUTROPHIL COUNT (OHS): 2.92 K/UL (ref 1.8–7.7)
ABSOLUTE NEUTROPHIL COUNT (OHS): 3.12 K/UL (ref 1.8–7.7)
ABSOLUTE NEUTROPHIL COUNT (OHS): 3.16 K/UL (ref 1.8–7.7)
ALBUMIN SERPL BCP-MCNC: 3.4 G/DL (ref 3.5–5.2)
ALP SERPL-CCNC: 102 UNIT/L (ref 40–150)
ALT SERPL W/O P-5'-P-CCNC: 13 UNIT/L (ref 10–44)
ANION GAP (OHS): 6 MMOL/L (ref 8–16)
AST SERPL-CCNC: 20 UNIT/L (ref 11–45)
BASOPHILS # BLD AUTO: 0.03 K/UL
BASOPHILS # BLD AUTO: 0.04 K/UL
BASOPHILS # BLD AUTO: 0.05 K/UL
BASOPHILS # BLD AUTO: 0.07 K/UL
BASOPHILS NFR BLD AUTO: 0.4 %
BASOPHILS NFR BLD AUTO: 0.5 %
BASOPHILS NFR BLD AUTO: 0.7 %
BASOPHILS NFR BLD AUTO: 0.8 %
BILIRUB SERPL-MCNC: 0.4 MG/DL (ref 0.1–1)
BUN SERPL-MCNC: 13 MG/DL (ref 6–20)
CALCIUM SERPL-MCNC: 8.9 MG/DL (ref 8.7–10.5)
CHLORIDE SERPL-SCNC: 109 MMOL/L (ref 95–110)
CO2 SERPL-SCNC: 22 MMOL/L (ref 23–29)
CREAT SERPL-MCNC: 1.1 MG/DL (ref 0.5–1.4)
ERYTHROCYTE [DISTWIDTH] IN BLOOD BY AUTOMATED COUNT: 21.4 % (ref 11.5–14.5)
ERYTHROCYTE [DISTWIDTH] IN BLOOD BY AUTOMATED COUNT: 21.6 % (ref 11.5–14.5)
ERYTHROCYTE [DISTWIDTH] IN BLOOD BY AUTOMATED COUNT: 21.6 % (ref 11.5–14.5)
ERYTHROCYTE [DISTWIDTH] IN BLOOD BY AUTOMATED COUNT: 21.8 % (ref 11.5–14.5)
GFR SERPLBLD CREATININE-BSD FMLA CKD-EPI: >60 ML/MIN/1.73/M2
GLUCOSE SERPL-MCNC: 136 MG/DL (ref 70–110)
HCT VFR BLD AUTO: 23.9 % (ref 37–48.5)
HCT VFR BLD AUTO: 24.5 % (ref 37–48.5)
HCT VFR BLD AUTO: 24.9 % (ref 37–48.5)
HCT VFR BLD AUTO: 25 % (ref 37–48.5)
HGB BLD-MCNC: 7.9 GM/DL (ref 12–16)
HGB BLD-MCNC: 7.9 GM/DL (ref 12–16)
HGB BLD-MCNC: 8 GM/DL (ref 12–16)
HGB BLD-MCNC: 8 GM/DL (ref 12–16)
IMM GRANULOCYTES # BLD AUTO: 0.01 K/UL (ref 0–0.04)
IMM GRANULOCYTES # BLD AUTO: 0.02 K/UL (ref 0–0.04)
IMM GRANULOCYTES NFR BLD AUTO: 0.1 % (ref 0–0.5)
IMM GRANULOCYTES NFR BLD AUTO: 0.2 % (ref 0–0.5)
INR PPP: 1.1 (ref 0.8–1.2)
LYMPHOCYTES # BLD AUTO: 2.85 K/UL (ref 1–4.8)
LYMPHOCYTES # BLD AUTO: 3.18 K/UL (ref 1–4.8)
LYMPHOCYTES # BLD AUTO: 3.28 K/UL (ref 1–4.8)
LYMPHOCYTES # BLD AUTO: 4.11 K/UL (ref 1–4.8)
MAGNESIUM SERPL-MCNC: 2 MG/DL (ref 1.6–2.6)
MCH RBC QN AUTO: 21.7 PG (ref 27–31)
MCH RBC QN AUTO: 21.7 PG (ref 27–31)
MCH RBC QN AUTO: 21.9 PG (ref 27–31)
MCH RBC QN AUTO: 22.1 PG (ref 27–31)
MCHC RBC AUTO-ENTMCNC: 32 G/DL (ref 32–36)
MCHC RBC AUTO-ENTMCNC: 32.1 G/DL (ref 32–36)
MCHC RBC AUTO-ENTMCNC: 32.2 G/DL (ref 32–36)
MCHC RBC AUTO-ENTMCNC: 33.1 G/DL (ref 32–36)
MCV RBC AUTO: 67 FL (ref 82–98)
MCV RBC AUTO: 68 FL (ref 82–98)
NUCLEATED RBC (/100WBC) (OHS): 1 /100 WBC
PHOSPHATE SERPL-MCNC: 3.4 MG/DL (ref 2.7–4.5)
PLATELET # BLD AUTO: 447 K/UL (ref 150–450)
PLATELET # BLD AUTO: 453 K/UL (ref 150–450)
PLATELET # BLD AUTO: 453 K/UL (ref 150–450)
PLATELET # BLD AUTO: 461 K/UL (ref 150–450)
PMV BLD AUTO: 9.1 FL (ref 9.2–12.9)
PMV BLD AUTO: 9.2 FL (ref 9.2–12.9)
PMV BLD AUTO: 9.3 FL (ref 9.2–12.9)
PMV BLD AUTO: 9.3 FL (ref 9.2–12.9)
POTASSIUM SERPL-SCNC: 3.9 MMOL/L (ref 3.5–5.1)
PROT SERPL-MCNC: 7 GM/DL (ref 6–8.4)
PROTHROMBIN TIME: 11.8 SECONDS (ref 9–12.5)
RBC # BLD AUTO: 3.58 M/UL (ref 4–5.4)
RBC # BLD AUTO: 3.61 M/UL (ref 4–5.4)
RBC # BLD AUTO: 3.68 M/UL (ref 4–5.4)
RBC # BLD AUTO: 3.69 M/UL (ref 4–5.4)
RELATIVE EOSINOPHIL (OHS): 4.6 %
RELATIVE EOSINOPHIL (OHS): 5.1 %
RELATIVE EOSINOPHIL (OHS): 5.7 %
RELATIVE EOSINOPHIL (OHS): 5.8 %
RELATIVE LYMPHOCYTE (OHS): 38.6 % (ref 18–48)
RELATIVE LYMPHOCYTE (OHS): 43.3 % (ref 18–48)
RELATIVE LYMPHOCYTE (OHS): 46 % (ref 18–48)
RELATIVE LYMPHOCYTE (OHS): 47.5 % (ref 18–48)
RELATIVE MONOCYTE (OHS): 10.9 % (ref 4–15)
RELATIVE MONOCYTE (OHS): 11.9 % (ref 4–15)
RELATIVE MONOCYTE (OHS): 12.6 % (ref 4–15)
RELATIVE MONOCYTE (OHS): 12.9 % (ref 4–15)
RELATIVE NEUTROPHIL (OHS): 34.8 % (ref 38–73)
RELATIVE NEUTROPHIL (OHS): 36 % (ref 38–73)
RELATIVE NEUTROPHIL (OHS): 38.6 % (ref 38–73)
RELATIVE NEUTROPHIL (OHS): 42.8 % (ref 38–73)
SODIUM SERPL-SCNC: 137 MMOL/L (ref 136–145)
WBC # BLD AUTO: 6.91 K/UL (ref 3.9–12.7)
WBC # BLD AUTO: 7.39 K/UL (ref 3.9–12.7)
WBC # BLD AUTO: 7.57 K/UL (ref 3.9–12.7)
WBC # BLD AUTO: 8.66 K/UL (ref 3.9–12.7)

## 2025-04-13 PROCEDURE — 84100 ASSAY OF PHOSPHORUS: CPT

## 2025-04-13 PROCEDURE — 11000001 HC ACUTE MED/SURG PRIVATE ROOM

## 2025-04-13 PROCEDURE — 83735 ASSAY OF MAGNESIUM: CPT

## 2025-04-13 PROCEDURE — 63600175 PHARM REV CODE 636 W HCPCS: Performed by: PHYSICIAN ASSISTANT

## 2025-04-13 PROCEDURE — 85610 PROTHROMBIN TIME: CPT

## 2025-04-13 PROCEDURE — 94799 UNLISTED PULMONARY SVC/PX: CPT

## 2025-04-13 PROCEDURE — 25000003 PHARM REV CODE 250: Performed by: PHYSICIAN ASSISTANT

## 2025-04-13 PROCEDURE — 99233 SBSQ HOSP IP/OBS HIGH 50: CPT | Mod: ,,, | Performed by: STUDENT IN AN ORGANIZED HEALTH CARE EDUCATION/TRAINING PROGRAM

## 2025-04-13 PROCEDURE — 80053 COMPREHEN METABOLIC PANEL: CPT

## 2025-04-13 PROCEDURE — 36415 COLL VENOUS BLD VENIPUNCTURE: CPT

## 2025-04-13 PROCEDURE — 85025 COMPLETE CBC W/AUTO DIFF WBC: CPT

## 2025-04-13 PROCEDURE — 25000003 PHARM REV CODE 250

## 2025-04-13 RX ORDER — WARFARIN 2.5 MG/1
2.5 TABLET ORAL DAILY
Status: DISCONTINUED | OUTPATIENT
Start: 2025-04-14 | End: 2025-04-17

## 2025-04-13 RX ORDER — HYDROMORPHONE HYDROCHLORIDE 2 MG/ML
1.5 INJECTION, SOLUTION INTRAMUSCULAR; INTRAVENOUS; SUBCUTANEOUS
Status: DISCONTINUED | OUTPATIENT
Start: 2025-04-13 | End: 2025-04-14

## 2025-04-13 RX ORDER — WARFARIN 2.5 MG/1
5 TABLET ORAL ONCE
Status: COMPLETED | OUTPATIENT
Start: 2025-04-13 | End: 2025-04-13

## 2025-04-13 RX ORDER — ENOXAPARIN SODIUM 100 MG/ML
1 INJECTION SUBCUTANEOUS EVERY 12 HOURS
Status: DISCONTINUED | OUTPATIENT
Start: 2025-04-13 | End: 2025-04-17

## 2025-04-13 RX ORDER — HYDROCODONE BITARTRATE AND ACETAMINOPHEN 10; 325 MG/1; MG/1
1 TABLET ORAL EVERY 4 HOURS PRN
Refills: 0 | Status: DISCONTINUED | OUTPATIENT
Start: 2025-04-13 | End: 2025-04-14

## 2025-04-13 RX ORDER — MORPHINE SULFATE 30 MG/1
30 TABLET, FILM COATED, EXTENDED RELEASE ORAL 3 TIMES DAILY
Refills: 0 | Status: DISCONTINUED | OUTPATIENT
Start: 2025-04-13 | End: 2025-04-28 | Stop reason: HOSPADM

## 2025-04-13 RX ADMIN — FOLIC ACID 1 MG: 1 TABLET ORAL at 08:04

## 2025-04-13 RX ADMIN — PANTOPRAZOLE SODIUM 40 MG: 40 TABLET, DELAYED RELEASE ORAL at 08:04

## 2025-04-13 RX ADMIN — HYDROMORPHONE HYDROCHLORIDE 2 MG: 2 INJECTION, SOLUTION INTRAMUSCULAR; INTRAVENOUS; SUBCUTANEOUS at 08:04

## 2025-04-13 RX ADMIN — WARFARIN SODIUM 5 MG: 2.5 TABLET ORAL at 05:04

## 2025-04-13 RX ADMIN — SUCRALFATE 1 G: 1 TABLET ORAL at 08:04

## 2025-04-13 RX ADMIN — GABAPENTIN 600 MG: 300 CAPSULE ORAL at 08:04

## 2025-04-13 RX ADMIN — METHOCARBAMOL 500 MG: 500 TABLET ORAL at 04:04

## 2025-04-13 RX ADMIN — GABAPENTIN 600 MG: 300 CAPSULE ORAL at 02:04

## 2025-04-13 RX ADMIN — ACETAMINOPHEN 1000 MG: 500 TABLET ORAL at 05:04

## 2025-04-13 RX ADMIN — MORPHINE SULFATE 30 MG: 30 TABLET, EXTENDED RELEASE ORAL at 08:04

## 2025-04-13 RX ADMIN — MORPHINE SULFATE 30 MG: 30 TABLET, EXTENDED RELEASE ORAL at 02:04

## 2025-04-13 RX ADMIN — HYDROMORPHONE HYDROCHLORIDE 1.5 MG: 2 INJECTION, SOLUTION INTRAMUSCULAR; INTRAVENOUS; SUBCUTANEOUS at 02:04

## 2025-04-13 RX ADMIN — HYDROMORPHONE HYDROCHLORIDE 1.5 MG: 2 INJECTION, SOLUTION INTRAMUSCULAR; INTRAVENOUS; SUBCUTANEOUS at 05:04

## 2025-04-13 RX ADMIN — SUCRALFATE 1 G: 1 TABLET ORAL at 04:04

## 2025-04-13 RX ADMIN — METHOCARBAMOL 500 MG: 500 TABLET ORAL at 12:04

## 2025-04-13 RX ADMIN — DIPHENHYDRAMINE HYDROCHLORIDE 25 MG: 50 INJECTION, SOLUTION INTRAMUSCULAR; INTRAVENOUS at 11:04

## 2025-04-13 RX ADMIN — HYDROMORPHONE HYDROCHLORIDE 1.5 MG: 2 INJECTION, SOLUTION INTRAMUSCULAR; INTRAVENOUS; SUBCUTANEOUS at 11:04

## 2025-04-13 RX ADMIN — FLUOXETINE HYDROCHLORIDE 80 MG: 20 CAPSULE ORAL at 08:04

## 2025-04-13 RX ADMIN — METOCLOPRAMIDE 5 MG: 5 TABLET ORAL at 12:04

## 2025-04-13 RX ADMIN — HYDROMORPHONE HYDROCHLORIDE 2 MG: 2 INJECTION, SOLUTION INTRAMUSCULAR; INTRAVENOUS; SUBCUTANEOUS at 03:04

## 2025-04-13 RX ADMIN — METHOCARBAMOL 500 MG: 500 TABLET ORAL at 08:04

## 2025-04-13 RX ADMIN — DIPHENHYDRAMINE HYDROCHLORIDE 25 MG: 50 INJECTION, SOLUTION INTRAMUSCULAR; INTRAVENOUS at 05:04

## 2025-04-13 RX ADMIN — HYDROMORPHONE HYDROCHLORIDE 1.5 MG: 2 INJECTION, SOLUTION INTRAMUSCULAR; INTRAVENOUS; SUBCUTANEOUS at 08:04

## 2025-04-13 RX ADMIN — METOCLOPRAMIDE 5 MG: 5 TABLET ORAL at 08:04

## 2025-04-13 RX ADMIN — AMLODIPINE BESYLATE 10 MG: 10 TABLET ORAL at 08:04

## 2025-04-13 RX ADMIN — METOCLOPRAMIDE 5 MG: 5 TABLET ORAL at 04:04

## 2025-04-13 RX ADMIN — SUCRALFATE 1 G: 1 TABLET ORAL at 11:04

## 2025-04-13 RX ADMIN — SUCRALFATE 1 G: 1 TABLET ORAL at 05:04

## 2025-04-13 RX ADMIN — ENOXAPARIN SODIUM 90 MG: 100 INJECTION SUBCUTANEOUS at 02:04

## 2025-04-13 RX ADMIN — PRAZOSIN HYDROCHLORIDE 1 MG: 1 CAPSULE ORAL at 08:04
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
## 2025-04-13 NOTE — PLAN OF CARE
Problem: Adult Inpatient Plan of Care  Goal: Plan of Care Review  Outcome: Progressing  Goal: Patient-Specific Goal (Individualized)  Outcome: Progressing  Goal: Absence of Hospital-Acquired Illness or Injury  Outcome: Progressing  Goal: Optimal Comfort and Wellbeing  Outcome: Progressing  Goal: Readiness for Transition of Care  Outcome: Progressing     Problem: Acute Kidney Injury/Impairment  Goal: Fluid and Electrolyte Balance  Outcome: Progressing  Goal: Improved Oral Intake  Outcome: Progressing  Goal: Effective Renal Function  Outcome: Progressing     Problem: Infection  Goal: Absence of Infection Signs and Symptoms  Outcome: Progressing     Problem: Pain Acute  Goal: Optimal Pain Control and Function  Outcome: Progressing     Problem: Pain Chronic (Persistent)  Goal: Optimal Pain Control and Function  Outcome: Progressing
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
## 2025-04-13 NOTE — ASSESSMENT & PLAN NOTE
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 Had been on apixiban and then Lovenox due to insurance coverage issues.  Now on coumadin with supratherapeutic INR.    - Hold coumadin with INR 8.3  - s/p vit K, improved to 1.1.   - pharm following for coumadin management.
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
## 2025-04-13 NOTE — SUBJECTIVE & OBJECTIVE
Interval History: NAOE, in some pain this am. Largely unchanged physical exam. Hgb stable 8 from 7.9. INR 1.1 today!    Medications:  Continuous Infusions:      Scheduled Meds:   acetaminophen  1,000 mg Oral Q8H    amLODIPine  10 mg Oral Daily    FLUoxetine  80 mg Oral Daily    folic acid  1 mg Oral Daily    gabapentin  600 mg Oral TID    methocarbamoL  500 mg Oral QID    metoclopramide HCl  5 mg Oral QID    pantoprazole  40 mg Oral BID    prazosin  1 mg Oral QHS    sucralfate  1 g Oral QID (AC & HS)     PRN Meds:  Current Facility-Administered Medications:     dextrose 50%, 12.5 g, Intravenous, PRN    dextrose 50%, 25 g, Intravenous, PRN    diphenhydrAMINE, 25 mg, Intravenous, Q6H PRN    glucagon (human recombinant), 1 mg, Intramuscular, PRN    glucose, 16 g, Oral, PRN    glucose, 24 g, Oral, PRN    HYDROmorphone, 2 mg, Intravenous, Q4H PRN    naloxone, 0.02 mg, Intravenous, PRN    senna-docusate, 1 tablet, Oral, Daily PRN    sodium chloride 0.9%, 10 mL, Intravenous, Q12H PRN     Review of patient's allergies indicates:   Allergen Reactions    Cat dander Anaphylaxis, Itching and Shortness Of Breath    Nsaids (non-steroidal anti-inflammatory drug) Itching and Anaphylaxis    Latex Rash     Objective:     Vital Signs (Most Recent):  Temp: 98.5 °F (36.9 °C) (04/13/25 0751)  Pulse: 89 (04/13/25 0751)  Resp: 17 (04/13/25 0807)  BP: (!) 143/85 (04/13/25 0751)  SpO2: 97 % (04/13/25 0751) Vital Signs (24h Range):  Temp:  [98.1 °F (36.7 °C)-98.9 °F (37.2 °C)] 98.5 °F (36.9 °C)  Pulse:  [81-92] 89  Resp:  [16-20] 17  SpO2:  [97 %-100 %] 97 %  BP: (103-151)/(64-97) 143/85     Weight: 93.9 kg (207 lb 0.2 oz)  Body mass index is 37.86 kg/m².    Intake/Output - Last 3 Shifts         04/11 0700  04/12 0659 04/12 0700 04/13 0659 04/13 0700  04/14 0659    P.O.   120    Total Intake(mL/kg)   120 (1.3)    Net   +120           Urine Occurrence  2 x     Stool Occurrence  0 x              Physical Exam  Vitals reviewed.    Constitutional:       General: She is not in acute distress.  HENT:      Head: Normocephalic and atraumatic.      Nose: Nose normal.   Eyes:      General:         Right eye: No discharge.         Left eye: No discharge.   Neck:      Comments: Left neck tenderness. No midline cervical tenderness   Cardiovascular:      Rate and Rhythm: Normal rate and regular rhythm.      Comments: 2+ pulses radially and distal lower extremities   Pulmonary:      Effort: Pulmonary effort is normal. No respiratory distress.      Breath sounds: No stridor.   Abdominal:      Comments: Soft, ND, NT  Some left sided discomfort    Musculoskeletal:         General: Normal range of motion.      Cervical back: Normal range of motion.      Comments: Left shoulder abrasion  Left forearm hematoma and swelling   Various abrasions on knees    Skin:     General: Skin is warm and dry.      Capillary Refill: Capillary refill takes 2 to 3 seconds.      Comments: Right breast hematoma with tenderness, stable    Neurological:      General: No focal deficit present.      Mental Status: She is alert and oriented to person, place, and time.      Comments: AOx3  Moving all extremities  CN grossly intact  NO FND  No midline spine tenderness      Psychiatric:         Mood and Affect: Mood normal.         Behavior: Behavior normal.      Significant Labs:  I have reviewed all pertinent lab results within the past 24 hours.  CBC:   Recent Labs   Lab 04/13/25  0451   WBC 7.57   RBC 3.68*   HGB 8.0*   HCT 25.0*   *   MCV 68*   MCH 21.7*   MCHC 32.0     CMP:   Recent Labs   Lab 04/13/25  0451   CALCIUM 8.9   ALBUMIN 3.4*      K 3.9   CO2 22*      BUN 13   CREATININE 1.1   ALKPHOS 102   ALT 13   AST 20   BILITOT 0.4       Significant Diagnostics:  I have reviewed all pertinent imaging results/findings within the past 24 hours.

## 2025-04-13 NOTE — ASSESSMENT & PLAN NOTE
Patient's most recent potassium results are listed below.   Recent Labs     04/11/25  1741 04/12/25  0412 04/13/25  0451   K 3.2* 3.6 3.9     EKG showed normal sinus rhythm.    Plan  - Replete potassium per protocol  - Monitor potassium Daily  - Patient's hypokalemia is stable

## 2025-04-13 NOTE — SUBJECTIVE & OBJECTIVE
Interval History: Pt reports severe pain today particularly is R breast. PCA pump dc'd and pain treated w/ prn dilaudid. Pain med interval lessened today, will wean as tolerated and resume oral pain meds. Labs and vitals stable.    Review of Systems   Constitutional:  Negative for chills and fever.   Respiratory:  Negative for cough and shortness of breath.    Cardiovascular:  Positive for chest pain. Negative for palpitations and leg swelling.   Gastrointestinal:  Negative for abdominal distention, abdominal pain, diarrhea, nausea and vomiting.   Genitourinary:  Negative for flank pain.   Musculoskeletal:  Positive for arthralgias and myalgias.   Skin:  Negative for wound.   Neurological:  Negative for dizziness and headaches.   Psychiatric/Behavioral:  Negative for confusion, decreased concentration and dysphoric mood.      Objective:     Vital Signs (Most Recent):  Temp: 98.2 °F (36.8 °C) (04/13/25 1053)  Pulse: 99 (04/13/25 1053)  Resp: 17 (04/13/25 1053)  BP: 125/74 (04/13/25 1053)  SpO2: 98 % (04/13/25 1053) Vital Signs (24h Range):  Temp:  [98.1 °F (36.7 °C)-98.9 °F (37.2 °C)] 98.2 °F (36.8 °C)  Pulse:  [81-99] 99  Resp:  [16-19] 17  SpO2:  [97 %-100 %] 98 %  BP: (103-151)/(64-97) 125/74     Weight: 93.9 kg (207 lb 0.2 oz)  Body mass index is 37.86 kg/m².    Intake/Output Summary (Last 24 hours) at 4/13/2025 1054  Last data filed at 4/13/2025 0805  Gross per 24 hour   Intake 120 ml   Output --   Net 120 ml         Physical Exam  Vitals reviewed.   Constitutional:       General: She is not in acute distress.  HENT:      Head: Normocephalic and atraumatic.      Nose: Nose normal.   Eyes:      General:         Right eye: No discharge.         Left eye: No discharge.   Neck:      Comments: Left neck tenderness. No midline cervical tenderness   Cardiovascular:      Rate and Rhythm: Normal rate and regular rhythm.      Comments: 2+ pulses radially and distal lower extremities   Pulmonary:      Effort: Pulmonary  effort is normal. No respiratory distress.      Breath sounds: No stridor.   Abdominal:      Comments: Soft, ND, NT  Some left sided discomfort    Musculoskeletal:         General: Normal range of motion.      Cervical back: Normal range of motion.      Comments: Left shoulder abrasion  Left forearm hematoma and swelling   Various abrasions on knees    Skin:     General: Skin is warm and dry.      Capillary Refill: Capillary refill takes 2 to 3 seconds.      Comments: Right breast hematoma with tenderness, stable    Neurological:      General: No focal deficit present.      Mental Status: She is alert and oriented to person, place, and time.      Comments: AOx3  Moving all extremities  CN grossly intact  No midline spine tenderness      Psychiatric:         Mood and Affect: Mood normal.         Behavior: Behavior normal.               Significant Labs: All pertinent labs within the past 24 hours have been reviewed.    Significant Imaging: I have reviewed all pertinent imaging results/findings within the past 24 hours.
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
## 2025-04-14 LAB
ABSOLUTE EOSINOPHIL (OHS): 0.39 K/UL
ABSOLUTE EOSINOPHIL (OHS): 0.4 K/UL
ABSOLUTE EOSINOPHIL (OHS): 0.43 K/UL
ABSOLUTE MONOCYTE (OHS): 0.86 K/UL (ref 0.3–1)
ABSOLUTE MONOCYTE (OHS): 0.89 K/UL (ref 0.3–1)
ABSOLUTE MONOCYTE (OHS): 0.98 K/UL (ref 0.3–1)
ABSOLUTE NEUTROPHIL COUNT (OHS): 1.85 K/UL (ref 1.8–7.7)
ABSOLUTE NEUTROPHIL COUNT (OHS): 2.15 K/UL (ref 1.8–7.7)
ABSOLUTE NEUTROPHIL COUNT (OHS): 2.21 K/UL (ref 1.8–7.7)
ALBUMIN SERPL BCP-MCNC: 3.2 G/DL (ref 3.5–5.2)
ALP SERPL-CCNC: 91 UNIT/L (ref 40–150)
ALT SERPL W/O P-5'-P-CCNC: 13 UNIT/L (ref 10–44)
ANION GAP (OHS): 10 MMOL/L (ref 8–16)
AST SERPL-CCNC: 17 UNIT/L (ref 11–45)
BASOPHILS # BLD AUTO: 0.04 K/UL
BASOPHILS # BLD AUTO: 0.05 K/UL
BASOPHILS # BLD AUTO: 0.07 K/UL
BASOPHILS NFR BLD AUTO: 0.6 %
BASOPHILS NFR BLD AUTO: 0.7 %
BASOPHILS NFR BLD AUTO: 0.9 %
BILIRUB SERPL-MCNC: 0.6 MG/DL (ref 0.1–1)
BUN SERPL-MCNC: 10 MG/DL (ref 6–20)
CALCIUM SERPL-MCNC: 8.9 MG/DL (ref 8.7–10.5)
CHLORIDE SERPL-SCNC: 108 MMOL/L (ref 95–110)
CO2 SERPL-SCNC: 21 MMOL/L (ref 23–29)
CREAT SERPL-MCNC: 0.9 MG/DL (ref 0.5–1.4)
ERYTHROCYTE [DISTWIDTH] IN BLOOD BY AUTOMATED COUNT: 21.7 % (ref 11.5–14.5)
ERYTHROCYTE [DISTWIDTH] IN BLOOD BY AUTOMATED COUNT: 21.7 % (ref 11.5–14.5)
ERYTHROCYTE [DISTWIDTH] IN BLOOD BY AUTOMATED COUNT: 23.1 % (ref 11.5–14.5)
GFR SERPLBLD CREATININE-BSD FMLA CKD-EPI: >60 ML/MIN/1.73/M2
GLUCOSE SERPL-MCNC: 79 MG/DL (ref 70–110)
HCT VFR BLD AUTO: 24.3 % (ref 37–48.5)
HCT VFR BLD AUTO: 24.6 % (ref 37–48.5)
HCT VFR BLD AUTO: 25.6 % (ref 37–48.5)
HGB BLD-MCNC: 7.8 GM/DL (ref 12–16)
HGB BLD-MCNC: 7.9 GM/DL (ref 12–16)
HGB BLD-MCNC: 8.2 GM/DL (ref 12–16)
IMM GRANULOCYTES # BLD AUTO: 0.01 K/UL (ref 0–0.04)
IMM GRANULOCYTES # BLD AUTO: 0.01 K/UL (ref 0–0.04)
IMM GRANULOCYTES # BLD AUTO: 0.02 K/UL (ref 0–0.04)
IMM GRANULOCYTES NFR BLD AUTO: 0.1 % (ref 0–0.5)
IMM GRANULOCYTES NFR BLD AUTO: 0.1 % (ref 0–0.5)
IMM GRANULOCYTES NFR BLD AUTO: 0.3 % (ref 0–0.5)
INR PPP: 1.2 (ref 0.8–1.2)
LYMPHOCYTES # BLD AUTO: 3.58 K/UL (ref 1–4.8)
LYMPHOCYTES # BLD AUTO: 4.15 K/UL (ref 1–4.8)
LYMPHOCYTES # BLD AUTO: 4.79 K/UL (ref 1–4.8)
MAGNESIUM SERPL-MCNC: 1.9 MG/DL (ref 1.6–2.6)
MCH RBC QN AUTO: 21.8 PG (ref 27–31)
MCH RBC QN AUTO: 21.8 PG (ref 27–31)
MCH RBC QN AUTO: 21.9 PG (ref 27–31)
MCHC RBC AUTO-ENTMCNC: 32 G/DL (ref 32–36)
MCHC RBC AUTO-ENTMCNC: 32.1 G/DL (ref 32–36)
MCHC RBC AUTO-ENTMCNC: 32.1 G/DL (ref 32–36)
MCV RBC AUTO: 68 FL (ref 82–98)
NUCLEATED RBC (/100WBC) (OHS): 2 /100 WBC
PHOSPHATE SERPL-MCNC: 3.1 MG/DL (ref 2.7–4.5)
PLATELET # BLD AUTO: 471 K/UL (ref 150–450)
PLATELET # BLD AUTO: 476 K/UL (ref 150–450)
PLATELET # BLD AUTO: 476 K/UL (ref 150–450)
PMV BLD AUTO: 10 FL (ref 9.2–12.9)
PMV BLD AUTO: 9.5 FL (ref 9.2–12.9)
PMV BLD AUTO: 9.9 FL (ref 9.2–12.9)
POIKILOCYTOSIS BLD QL SMEAR: SLIGHT
POTASSIUM SERPL-SCNC: 3.8 MMOL/L (ref 3.5–5.1)
PROT SERPL-MCNC: 6.9 GM/DL (ref 6–8.4)
PROTHROMBIN TIME: 13.3 SECONDS (ref 9–12.5)
RBC # BLD AUTO: 3.57 M/UL (ref 4–5.4)
RBC # BLD AUTO: 3.63 M/UL (ref 4–5.4)
RBC # BLD AUTO: 3.74 M/UL (ref 4–5.4)
RELATIVE EOSINOPHIL (OHS): 4.8 %
RELATIVE EOSINOPHIL (OHS): 5.6 %
RELATIVE EOSINOPHIL (OHS): 5.6 %
RELATIVE LYMPHOCYTE (OHS): 50.4 % (ref 18–48)
RELATIVE LYMPHOCYTE (OHS): 54 % (ref 18–48)
RELATIVE LYMPHOCYTE (OHS): 59.2 % (ref 18–48)
RELATIVE MONOCYTE (OHS): 11.6 % (ref 4–15)
RELATIVE MONOCYTE (OHS): 12.1 % (ref 4–15)
RELATIVE MONOCYTE (OHS): 12.1 % (ref 4–15)
RELATIVE NEUTROPHIL (OHS): 22.9 % (ref 38–73)
RELATIVE NEUTROPHIL (OHS): 27.8 % (ref 38–73)
RELATIVE NEUTROPHIL (OHS): 31.2 % (ref 38–73)
RETICS/RBC NFR AUTO: 1.4 % (ref 0.5–2.5)
SODIUM SERPL-SCNC: 139 MMOL/L (ref 136–145)
TARGETS BLD QL SMEAR: NORMAL
WBC # BLD AUTO: 7.1 K/UL (ref 3.9–12.7)
WBC # BLD AUTO: 7.69 K/UL (ref 3.9–12.7)
WBC # BLD AUTO: 8.09 K/UL (ref 3.9–12.7)

## 2025-04-14 PROCEDURE — 85045 AUTOMATED RETICULOCYTE COUNT: CPT | Performed by: PHYSICIAN ASSISTANT

## 2025-04-14 PROCEDURE — 25000003 PHARM REV CODE 250

## 2025-04-14 PROCEDURE — 36415 COLL VENOUS BLD VENIPUNCTURE: CPT

## 2025-04-14 PROCEDURE — 11000001 HC ACUTE MED/SURG PRIVATE ROOM

## 2025-04-14 PROCEDURE — 84100 ASSAY OF PHOSPHORUS: CPT

## 2025-04-14 PROCEDURE — 83735 ASSAY OF MAGNESIUM: CPT

## 2025-04-14 PROCEDURE — 85610 PROTHROMBIN TIME: CPT

## 2025-04-14 PROCEDURE — 85025 COMPLETE CBC W/AUTO DIFF WBC: CPT

## 2025-04-14 PROCEDURE — 63600175 PHARM REV CODE 636 W HCPCS: Performed by: PHYSICIAN ASSISTANT

## 2025-04-14 PROCEDURE — 80053 COMPREHEN METABOLIC PANEL: CPT

## 2025-04-14 PROCEDURE — 25000003 PHARM REV CODE 250: Performed by: PHYSICIAN ASSISTANT

## 2025-04-14 RX ORDER — HYDROMORPHONE HYDROCHLORIDE 2 MG/ML
2 INJECTION, SOLUTION INTRAMUSCULAR; INTRAVENOUS; SUBCUTANEOUS
Status: DISCONTINUED | OUTPATIENT
Start: 2025-04-14 | End: 2025-04-18

## 2025-04-14 RX ORDER — SODIUM CHLORIDE 9 MG/ML
INJECTION, SOLUTION INTRAVENOUS CONTINUOUS
Status: ACTIVE | OUTPATIENT
Start: 2025-04-14 | End: 2025-04-15

## 2025-04-14 RX ORDER — HYDROCODONE BITARTRATE AND ACETAMINOPHEN 10; 325 MG/1; MG/1
1 TABLET ORAL EVERY 4 HOURS
Refills: 0 | Status: DISCONTINUED | OUTPATIENT
Start: 2025-04-14 | End: 2025-04-28 | Stop reason: HOSPADM

## 2025-04-14 RX ORDER — HYDROMORPHONE HYDROCHLORIDE 2 MG/ML
1.5 INJECTION, SOLUTION INTRAMUSCULAR; INTRAVENOUS; SUBCUTANEOUS EVERY 4 HOURS PRN
Status: DISCONTINUED | OUTPATIENT
Start: 2025-04-14 | End: 2025-04-14

## 2025-04-14 RX ORDER — DIPHENHYDRAMINE HYDROCHLORIDE 50 MG/ML
25 INJECTION, SOLUTION INTRAMUSCULAR; INTRAVENOUS EVERY 6 HOURS
Status: DISCONTINUED | OUTPATIENT
Start: 2025-04-14 | End: 2025-04-28 | Stop reason: HOSPADM

## 2025-04-14 RX ORDER — HYDROCODONE BITARTRATE AND ACETAMINOPHEN 10; 325 MG/1; MG/1
1 TABLET ORAL EVERY 4 HOURS PRN
Refills: 0 | Status: DISCONTINUED | OUTPATIENT
Start: 2025-04-14 | End: 2025-04-14

## 2025-04-14 RX ADMIN — DIPHENHYDRAMINE HYDROCHLORIDE 25 MG: 50 INJECTION, SOLUTION INTRAMUSCULAR; INTRAVENOUS at 01:04

## 2025-04-14 RX ADMIN — METHOCARBAMOL 500 MG: 500 TABLET ORAL at 01:04

## 2025-04-14 RX ADMIN — ENOXAPARIN SODIUM 90 MG: 100 INJECTION SUBCUTANEOUS at 03:04

## 2025-04-14 RX ADMIN — METOCLOPRAMIDE 5 MG: 5 TABLET ORAL at 02:04

## 2025-04-14 RX ADMIN — SUCRALFATE 1 G: 1 TABLET ORAL at 06:04

## 2025-04-14 RX ADMIN — HYDROMORPHONE HYDROCHLORIDE 2 MG: 2 INJECTION INTRAMUSCULAR; INTRAVENOUS; SUBCUTANEOUS at 11:04

## 2025-04-14 RX ADMIN — HYDROCODONE BITARTRATE AND ACETAMINOPHEN 1 TABLET: 10; 325 TABLET ORAL at 06:04

## 2025-04-14 RX ADMIN — METOCLOPRAMIDE 5 MG: 5 TABLET ORAL at 09:04

## 2025-04-14 RX ADMIN — WARFARIN SODIUM 2.5 MG: 2.5 TABLET ORAL at 05:04

## 2025-04-14 RX ADMIN — MORPHINE SULFATE 30 MG: 30 TABLET, EXTENDED RELEASE ORAL at 01:04

## 2025-04-14 RX ADMIN — PRAZOSIN HYDROCHLORIDE 1 MG: 1 CAPSULE ORAL at 09:04

## 2025-04-14 RX ADMIN — METHOCARBAMOL 500 MG: 500 TABLET ORAL at 08:04

## 2025-04-14 RX ADMIN — GABAPENTIN 600 MG: 300 CAPSULE ORAL at 09:04

## 2025-04-14 RX ADMIN — AMLODIPINE BESYLATE 10 MG: 10 TABLET ORAL at 08:04

## 2025-04-14 RX ADMIN — SODIUM CHLORIDE: 9 INJECTION, SOLUTION INTRAVENOUS at 10:04

## 2025-04-14 RX ADMIN — METHOCARBAMOL 500 MG: 500 TABLET ORAL at 09:04

## 2025-04-14 RX ADMIN — FOLIC ACID 1 MG: 1 TABLET ORAL at 08:04

## 2025-04-14 RX ADMIN — DIPHENHYDRAMINE HYDROCHLORIDE 25 MG: 50 INJECTION, SOLUTION INTRAMUSCULAR; INTRAVENOUS at 08:04

## 2025-04-14 RX ADMIN — GABAPENTIN 600 MG: 300 CAPSULE ORAL at 01:04

## 2025-04-14 RX ADMIN — HYDROMORPHONE HYDROCHLORIDE 2 MG: 2 INJECTION INTRAMUSCULAR; INTRAVENOUS; SUBCUTANEOUS at 02:04

## 2025-04-14 RX ADMIN — METOCLOPRAMIDE 5 MG: 5 TABLET ORAL at 05:04

## 2025-04-14 RX ADMIN — PANTOPRAZOLE SODIUM 40 MG: 40 TABLET, DELAYED RELEASE ORAL at 09:04

## 2025-04-14 RX ADMIN — SUCRALFATE 1 G: 1 TABLET ORAL at 05:04

## 2025-04-14 RX ADMIN — HYDROMORPHONE HYDROCHLORIDE 1.5 MG: 2 INJECTION, SOLUTION INTRAMUSCULAR; INTRAVENOUS; SUBCUTANEOUS at 04:04

## 2025-04-14 RX ADMIN — ENOXAPARIN SODIUM 90 MG: 100 INJECTION SUBCUTANEOUS at 02:04

## 2025-04-14 RX ADMIN — METOCLOPRAMIDE 5 MG: 5 TABLET ORAL at 08:04

## 2025-04-14 RX ADMIN — SUCRALFATE 1 G: 1 TABLET ORAL at 11:04

## 2025-04-14 RX ADMIN — MORPHINE SULFATE 30 MG: 30 TABLET, EXTENDED RELEASE ORAL at 09:04

## 2025-04-14 RX ADMIN — MORPHINE SULFATE 30 MG: 30 TABLET, EXTENDED RELEASE ORAL at 08:04

## 2025-04-14 RX ADMIN — HYDROMORPHONE HYDROCHLORIDE 2 MG: 2 INJECTION INTRAMUSCULAR; INTRAVENOUS; SUBCUTANEOUS at 05:04

## 2025-04-14 RX ADMIN — HYDROMORPHONE HYDROCHLORIDE 2 MG: 2 INJECTION INTRAMUSCULAR; INTRAVENOUS; SUBCUTANEOUS at 08:04

## 2025-04-14 RX ADMIN — HYDROCODONE BITARTRATE AND ACETAMINOPHEN 1 TABLET: 10; 325 TABLET ORAL at 10:04

## 2025-04-14 RX ADMIN — PANTOPRAZOLE SODIUM 40 MG: 40 TABLET, DELAYED RELEASE ORAL at 08:04

## 2025-04-14 RX ADMIN — GABAPENTIN 600 MG: 300 CAPSULE ORAL at 08:04

## 2025-04-14 RX ADMIN — HYDROCODONE BITARTRATE AND ACETAMINOPHEN 1 TABLET: 10; 325 TABLET ORAL at 02:04

## 2025-04-14 RX ADMIN — HYDROCODONE BITARTRATE AND ACETAMINOPHEN 1 TABLET: 10; 325 TABLET ORAL at 09:04

## 2025-04-14 RX ADMIN — SUCRALFATE 1 G: 1 TABLET ORAL at 09:04

## 2025-04-14 RX ADMIN — FLUOXETINE HYDROCHLORIDE 80 MG: 20 CAPSULE ORAL at 08:04

## 2025-04-14 RX ADMIN — HYDROMORPHONE HYDROCHLORIDE 1.5 MG: 2 INJECTION, SOLUTION INTRAMUSCULAR; INTRAVENOUS; SUBCUTANEOUS at 08:04

## 2025-04-14 RX ADMIN — METHOCARBAMOL 500 MG: 500 TABLET ORAL at 05:04

## 2025-04-14 NOTE — CONSULTS
PHARMACY CONSULT NOTE: WARFARIN  Nazanin Malone is a 45 y.o. female on warfarin therapy for History of DVT/PE. PharmD has been consulted for warfarin dosing.    Current order: 2.5 mg daily  Home dose: 2.5 mg daily  Coumadin clinic enrollment: Active  INR goal: 2-3    Lab Results   Component Value Date    INR 1.2 04/14/2025    INR 1.1 04/13/2025    INR 2.2 (H) 04/12/2025     Significant drug interactions: none at this time  Diet: Adult Regular without supplements     Recommendation(s):   Patient's INR noted to be 7.9 on 4/10 coumadin clinic visit - seems patient received 5 mg MW (4/7, 4/9) prior to INR increasing per 4/7 note  At admit on 4/11, INR was 8.3 - patient was given vitamin K which will provide some resistance with increasing the INR  Patient given 5 mg Sunday 4/13 - hesitate to give another dose of 5 mg given the previous INR trend  Plan to c/w 2.5 mg today and follow up with INR trend 4/15; appropriate to c/w lovenox as bridge  It appears patient previously failed Eliquis per August 2024 note - she was on 5 mg BID and experienced a DVT  Pharmacy will continue to follow and monitor warfarin    Thank you for the consult,  Bubba Barriga, PharmD, BCPS   Ext. [phone]    **Note: Consults are reviewed Monday-Friday 7:00am-3:30pm. THE ABOVE RECOMMENDATIONS ARE ONLY SUGGESTED.THE RECOMMENDATIONS SHOULD BE CONSIDERED IN CONJUNCTION WITH ALL PATIENT FACTORS. **
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
## 2025-04-14 NOTE — ASSESSMENT & PLAN NOTE
Patient's hemorrhage is due to trauma/laceration, this bleeding is associated with an anticoagulant, the anticoagulant is Select Anticoagulant(s): Warfarin/Coumadin. Patients most recent Hgb, Hct, platelets, and INR are listed below.  Recent Labs     04/12/25  0412 04/12/25  0946 04/13/25  0451 04/13/25  1133 04/13/25  1955 04/14/25  0541 04/14/25  1153   HGB 8.5*   < > 8.0*   < > 7.9* 8.2* 7.9*   HCT 26.6*   < > 25.0*   < > 23.9* 25.6* 24.6*      < > 453*   < > 447 476* 471*   INR 2.2*  --  1.1  --   --  1.2  --     < > = values in this interval not displayed.     Plan  - Will trend hemoglobin/hematocrit Daily  - Will monitor and correct any coagulation defects  - Will transfuse if Hgb is <7g/dl (<8g/dl in cases of active ACS) or if patient has rapid bleeding leading to hemodynamic instability  - Compression wrap placed in the ED

## 2025-04-14 NOTE — PLAN OF CARE
Discharge Plan A and Plan B have been determined by review of patient's clinical status, future medical and therapeutic needs, and coverage/benefits for post-acute care in coordination with multidisciplinary team members.    04/14/25 1407   Post-Acute Status   Post-Acute Authorization Home Health   Home Health Status Pending medical clearance/testing   Hospital Resources/Appts/Education Provided Provided education on problems/symptoms using teachback   Discharge Plan   Discharge Plan A Home with family;Home Health   Discharge Plan B Home with family     [facility] confirmed that provider accepts/is following. DARRION provided [facility] w/ ALYSE of Today 4/14. [facility] will continue to follow.    2:05pm  Per MD, patient not medically ready for dc today. Updated ALYSE 4/16. DARRION provided [facility] w/ update.                   ADRIEN Lehman, LMSW  [facility]  Case Management  Ext. [phone]

## 2025-04-14 NOTE — ASSESSMENT & PLAN NOTE
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 Had been on apixiban and then Lovenox due to insurance coverage issues.  Now on coumadin with supratherapeutic INR.    - INR 2.2 today  - resumed warfarin w/ lovenox bridge per pharmacy  - s/p vit K  - pharm following for coumadin management.

## 2025-04-14 NOTE — PLAN OF CARE
No acute events overnight. Pain medication effective (see MAR). No other needs expressed.       Problem: Adult Inpatient Plan of Care  Goal: Plan of Care Review  Outcome: Progressing  Goal: Absence of Hospital-Acquired Illness or Injury  Outcome: Progressing  Goal: Optimal Comfort and Wellbeing  Outcome: Progressing  Goal: Readiness for Transition of Care  Outcome: Progressing     Problem: Acute Kidney Injury/Impairment  Goal: Fluid and Electrolyte Balance  Outcome: Progressing  Goal: Improved Oral Intake  Outcome: Progressing  Goal: Effective Renal Function  Outcome: Progressing     Problem: Pain Acute  Goal: Optimal Pain Control and Function  Outcome: Progressing     Problem: Pain Chronic (Persistent)  Goal: Optimal Pain Control and Function  Outcome: Progressing
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
## 2025-04-14 NOTE — ASSESSMENT & PLAN NOTE
-continue home hydrocodone-acetomenophen  q4h, morphine 30 mg tid  -IV benadryl  -IV dilaudid q3h PRN for breakthrough pain  -Continuous IV fluid hydration

## 2025-04-14 NOTE — ASSESSMENT & PLAN NOTE
Patient's most recent potassium results are listed below.   Recent Labs     04/12/25  0412 04/13/25  0451 04/14/25  0541   K 3.6 3.9 3.8     EKG showed normal sinus rhythm.    Plan  - Replete potassium per protocol  - Monitor potassium Daily  - Patient's hypokalemia is stable
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
## 2025-04-15 LAB
ABSOLUTE EOSINOPHIL (OHS): 0.38 K/UL
ABSOLUTE EOSINOPHIL (OHS): 0.4 K/UL
ABSOLUTE EOSINOPHIL (OHS): 0.47 K/UL
ABSOLUTE MONOCYTE (OHS): 1.04 K/UL (ref 0.3–1)
ABSOLUTE MONOCYTE (OHS): 1.18 K/UL (ref 0.3–1)
ABSOLUTE MONOCYTE (OHS): 1.2 K/UL (ref 0.3–1)
ABSOLUTE NEUTROPHIL COUNT (OHS): 2.53 K/UL (ref 1.8–7.7)
ABSOLUTE NEUTROPHIL COUNT (OHS): 3.2 K/UL (ref 1.8–7.7)
ABSOLUTE NEUTROPHIL COUNT (OHS): 4.43 K/UL (ref 1.8–7.7)
ALBUMIN SERPL BCP-MCNC: 3.2 G/DL (ref 3.5–5.2)
ALP SERPL-CCNC: 86 UNIT/L (ref 40–150)
ALT SERPL W/O P-5'-P-CCNC: 11 UNIT/L (ref 10–44)
ANION GAP (OHS): 4 MMOL/L (ref 8–16)
AST SERPL-CCNC: 16 UNIT/L (ref 11–45)
BASOPHILS # BLD AUTO: 0.06 K/UL
BASOPHILS # BLD AUTO: 0.08 K/UL
BASOPHILS # BLD AUTO: 0.08 K/UL
BASOPHILS NFR BLD AUTO: 0.7 %
BASOPHILS NFR BLD AUTO: 0.9 %
BASOPHILS NFR BLD AUTO: 0.9 %
BILIRUB SERPL-MCNC: 0.5 MG/DL (ref 0.1–1)
BUN SERPL-MCNC: 14 MG/DL (ref 6–20)
CALCIUM SERPL-MCNC: 8.9 MG/DL (ref 8.7–10.5)
CHLORIDE SERPL-SCNC: 105 MMOL/L (ref 95–110)
CO2 SERPL-SCNC: 26 MMOL/L (ref 23–29)
CREAT SERPL-MCNC: 1.1 MG/DL (ref 0.5–1.4)
ERYTHROCYTE [DISTWIDTH] IN BLOOD BY AUTOMATED COUNT: 21.8 % (ref 11.5–14.5)
ERYTHROCYTE [DISTWIDTH] IN BLOOD BY AUTOMATED COUNT: 22 % (ref 11.5–14.5)
ERYTHROCYTE [DISTWIDTH] IN BLOOD BY AUTOMATED COUNT: 22.1 % (ref 11.5–14.5)
GFR SERPLBLD CREATININE-BSD FMLA CKD-EPI: >60 ML/MIN/1.73/M2
GLUCOSE SERPL-MCNC: 96 MG/DL (ref 70–110)
HCT VFR BLD AUTO: 23.7 % (ref 37–48.5)
HCT VFR BLD AUTO: 24.1 % (ref 37–48.5)
HCT VFR BLD AUTO: 26.3 % (ref 37–48.5)
HGB BLD-MCNC: 7.7 GM/DL (ref 12–16)
HGB BLD-MCNC: 7.7 GM/DL (ref 12–16)
HGB BLD-MCNC: 8.2 GM/DL (ref 12–16)
IMM GRANULOCYTES # BLD AUTO: 0.02 K/UL (ref 0–0.04)
IMM GRANULOCYTES # BLD AUTO: 0.02 K/UL (ref 0–0.04)
IMM GRANULOCYTES # BLD AUTO: 0.03 K/UL (ref 0–0.04)
IMM GRANULOCYTES NFR BLD AUTO: 0.2 % (ref 0–0.5)
IMM GRANULOCYTES NFR BLD AUTO: 0.2 % (ref 0–0.5)
IMM GRANULOCYTES NFR BLD AUTO: 0.3 % (ref 0–0.5)
INR PPP: 1.5 (ref 0.8–1.2)
LYMPHOCYTES # BLD AUTO: 2.68 K/UL (ref 1–4.8)
LYMPHOCYTES # BLD AUTO: 4.24 K/UL (ref 1–4.8)
LYMPHOCYTES # BLD AUTO: 4.85 K/UL (ref 1–4.8)
MAGNESIUM SERPL-MCNC: 1.8 MG/DL (ref 1.6–2.6)
MCH RBC QN AUTO: 21.5 PG (ref 27–31)
MCH RBC QN AUTO: 22.1 PG (ref 27–31)
MCH RBC QN AUTO: 22.3 PG (ref 27–31)
MCHC RBC AUTO-ENTMCNC: 31.2 G/DL (ref 32–36)
MCHC RBC AUTO-ENTMCNC: 32 G/DL (ref 32–36)
MCHC RBC AUTO-ENTMCNC: 32.5 G/DL (ref 32–36)
MCV RBC AUTO: 69 FL (ref 82–98)
NUCLEATED RBC (/100WBC) (OHS): 2 /100 WBC
NUCLEATED RBC (/100WBC) (OHS): 2 /100 WBC
NUCLEATED RBC (/100WBC) (OHS): 3 /100 WBC
PHOSPHATE SERPL-MCNC: 3.7 MG/DL (ref 2.7–4.5)
PLATELET # BLD AUTO: 422 K/UL (ref 150–450)
PLATELET # BLD AUTO: 440 K/UL (ref 150–450)
PLATELET # BLD AUTO: 467 K/UL (ref 150–450)
PMV BLD AUTO: 9.1 FL (ref 9.2–12.9)
PMV BLD AUTO: 9.9 FL (ref 9.2–12.9)
PMV BLD AUTO: 9.9 FL (ref 9.2–12.9)
POTASSIUM SERPL-SCNC: 4.2 MMOL/L (ref 3.5–5.1)
PROT SERPL-MCNC: 7.1 GM/DL (ref 6–8.4)
PROTHROMBIN TIME: 16.3 SECONDS (ref 9–12.5)
RBC # BLD AUTO: 3.46 M/UL (ref 4–5.4)
RBC # BLD AUTO: 3.49 M/UL (ref 4–5.4)
RBC # BLD AUTO: 3.82 M/UL (ref 4–5.4)
RELATIVE EOSINOPHIL (OHS): 4.4 %
RELATIVE EOSINOPHIL (OHS): 4.4 %
RELATIVE EOSINOPHIL (OHS): 5.1 %
RELATIVE LYMPHOCYTE (OHS): 31.1 % (ref 18–48)
RELATIVE LYMPHOCYTE (OHS): 46.1 % (ref 18–48)
RELATIVE LYMPHOCYTE (OHS): 53.4 % (ref 18–48)
RELATIVE MONOCYTE (OHS): 12.1 % (ref 4–15)
RELATIVE MONOCYTE (OHS): 12.8 % (ref 4–15)
RELATIVE MONOCYTE (OHS): 13.2 % (ref 4–15)
RELATIVE NEUTROPHIL (OHS): 27.9 % (ref 38–73)
RELATIVE NEUTROPHIL (OHS): 34.9 % (ref 38–73)
RELATIVE NEUTROPHIL (OHS): 51.4 % (ref 38–73)
SODIUM SERPL-SCNC: 135 MMOL/L (ref 136–145)
WBC # BLD AUTO: 8.62 K/UL (ref 3.9–12.7)
WBC # BLD AUTO: 9.08 K/UL (ref 3.9–12.7)
WBC # BLD AUTO: 9.19 K/UL (ref 3.9–12.7)

## 2025-04-15 PROCEDURE — 63600175 PHARM REV CODE 636 W HCPCS: Performed by: PHYSICIAN ASSISTANT

## 2025-04-15 PROCEDURE — 85610 PROTHROMBIN TIME: CPT

## 2025-04-15 PROCEDURE — 25000003 PHARM REV CODE 250: Performed by: PHYSICIAN ASSISTANT

## 2025-04-15 PROCEDURE — 85025 COMPLETE CBC W/AUTO DIFF WBC: CPT

## 2025-04-15 PROCEDURE — 25000003 PHARM REV CODE 250

## 2025-04-15 PROCEDURE — 25000003 PHARM REV CODE 250: Performed by: STUDENT IN AN ORGANIZED HEALTH CARE EDUCATION/TRAINING PROGRAM

## 2025-04-15 PROCEDURE — 36415 COLL VENOUS BLD VENIPUNCTURE: CPT

## 2025-04-15 PROCEDURE — 84100 ASSAY OF PHOSPHORUS: CPT

## 2025-04-15 PROCEDURE — 80053 COMPREHEN METABOLIC PANEL: CPT

## 2025-04-15 PROCEDURE — 11000001 HC ACUTE MED/SURG PRIVATE ROOM

## 2025-04-15 PROCEDURE — 83735 ASSAY OF MAGNESIUM: CPT

## 2025-04-15 RX ORDER — HYDRALAZINE HYDROCHLORIDE 25 MG/1
25 TABLET, FILM COATED ORAL ONCE
Status: COMPLETED | OUTPATIENT
Start: 2025-04-15 | End: 2025-04-15

## 2025-04-15 RX ORDER — MUPIROCIN 20 MG/G
OINTMENT TOPICAL 2 TIMES DAILY
Status: DISPENSED | OUTPATIENT
Start: 2025-04-15 | End: 2025-04-20

## 2025-04-15 RX ADMIN — DIPHENHYDRAMINE HYDROCHLORIDE 25 MG: 50 INJECTION, SOLUTION INTRAMUSCULAR; INTRAVENOUS at 06:04

## 2025-04-15 RX ADMIN — METOCLOPRAMIDE 5 MG: 5 TABLET ORAL at 08:04

## 2025-04-15 RX ADMIN — AMLODIPINE BESYLATE 10 MG: 10 TABLET ORAL at 08:04

## 2025-04-15 RX ADMIN — HYDRALAZINE HYDROCHLORIDE 25 MG: 25 TABLET ORAL at 06:04

## 2025-04-15 RX ADMIN — MORPHINE SULFATE 30 MG: 30 TABLET, EXTENDED RELEASE ORAL at 08:04

## 2025-04-15 RX ADMIN — HYDROMORPHONE HYDROCHLORIDE 2 MG: 2 INJECTION INTRAMUSCULAR; INTRAVENOUS; SUBCUTANEOUS at 08:04

## 2025-04-15 RX ADMIN — HYDROCODONE BITARTRATE AND ACETAMINOPHEN 1 TABLET: 10; 325 TABLET ORAL at 06:04

## 2025-04-15 RX ADMIN — GABAPENTIN 600 MG: 300 CAPSULE ORAL at 09:04

## 2025-04-15 RX ADMIN — PANTOPRAZOLE SODIUM 40 MG: 40 TABLET, DELAYED RELEASE ORAL at 08:04

## 2025-04-15 RX ADMIN — SENNOSIDES AND DOCUSATE SODIUM 1 TABLET: 50; 8.6 TABLET ORAL at 01:04

## 2025-04-15 RX ADMIN — HYDROMORPHONE HYDROCHLORIDE 2 MG: 2 INJECTION INTRAMUSCULAR; INTRAVENOUS; SUBCUTANEOUS at 02:04

## 2025-04-15 RX ADMIN — HYDROMORPHONE HYDROCHLORIDE 2 MG: 2 INJECTION INTRAMUSCULAR; INTRAVENOUS; SUBCUTANEOUS at 05:04

## 2025-04-15 RX ADMIN — METOCLOPRAMIDE 5 MG: 5 TABLET ORAL at 01:04

## 2025-04-15 RX ADMIN — DIPHENHYDRAMINE HYDROCHLORIDE 25 MG: 50 INJECTION, SOLUTION INTRAMUSCULAR; INTRAVENOUS at 02:04

## 2025-04-15 RX ADMIN — HYDROMORPHONE HYDROCHLORIDE 2 MG: 2 INJECTION INTRAMUSCULAR; INTRAVENOUS; SUBCUTANEOUS at 11:04

## 2025-04-15 RX ADMIN — SUCRALFATE 1 G: 1 TABLET ORAL at 06:04

## 2025-04-15 RX ADMIN — WARFARIN SODIUM 2.5 MG: 2.5 TABLET ORAL at 05:04

## 2025-04-15 RX ADMIN — MORPHINE SULFATE 30 MG: 30 TABLET, EXTENDED RELEASE ORAL at 03:04

## 2025-04-15 RX ADMIN — GABAPENTIN 600 MG: 300 CAPSULE ORAL at 08:04

## 2025-04-15 RX ADMIN — METHOCARBAMOL 500 MG: 500 TABLET ORAL at 08:04

## 2025-04-15 RX ADMIN — ENOXAPARIN SODIUM 90 MG: 100 INJECTION SUBCUTANEOUS at 02:04

## 2025-04-15 RX ADMIN — FLUOXETINE HYDROCHLORIDE 80 MG: 20 CAPSULE ORAL at 08:04

## 2025-04-15 RX ADMIN — HYDROCODONE BITARTRATE AND ACETAMINOPHEN 1 TABLET: 10; 325 TABLET ORAL at 03:04

## 2025-04-15 RX ADMIN — HYDROCODONE BITARTRATE AND ACETAMINOPHEN 1 TABLET: 10; 325 TABLET ORAL at 10:04

## 2025-04-15 RX ADMIN — DIPHENHYDRAMINE HYDROCHLORIDE 25 MG: 50 INJECTION, SOLUTION INTRAMUSCULAR; INTRAVENOUS at 11:04

## 2025-04-15 RX ADMIN — ENOXAPARIN SODIUM 90 MG: 100 INJECTION SUBCUTANEOUS at 03:04

## 2025-04-15 RX ADMIN — SUCRALFATE 1 G: 1 TABLET ORAL at 05:04

## 2025-04-15 RX ADMIN — SUCRALFATE 1 G: 1 TABLET ORAL at 10:04

## 2025-04-15 RX ADMIN — METHOCARBAMOL 500 MG: 500 TABLET ORAL at 01:04

## 2025-04-15 RX ADMIN — METOCLOPRAMIDE 5 MG: 5 TABLET ORAL at 09:04

## 2025-04-15 RX ADMIN — PRAZOSIN HYDROCHLORIDE 1 MG: 1 CAPSULE ORAL at 08:04

## 2025-04-15 RX ADMIN — FOLIC ACID 1 MG: 1 TABLET ORAL at 08:04

## 2025-04-15 RX ADMIN — LACTULOSE 20 G: 20 SOLUTION ORAL at 02:04

## 2025-04-15 RX ADMIN — DIPHENHYDRAMINE HYDROCHLORIDE 25 MG: 50 INJECTION, SOLUTION INTRAMUSCULAR; INTRAVENOUS at 05:04

## 2025-04-15 RX ADMIN — METHOCARBAMOL 500 MG: 500 TABLET ORAL at 05:04

## 2025-04-15 RX ADMIN — METOCLOPRAMIDE 5 MG: 5 TABLET ORAL at 05:04

## 2025-04-15 RX ADMIN — HYDROCODONE BITARTRATE AND ACETAMINOPHEN 1 TABLET: 10; 325 TABLET ORAL at 01:04

## 2025-04-15 RX ADMIN — SUCRALFATE 1 G: 1 TABLET ORAL at 08:04

## 2025-04-15 RX ADMIN — GABAPENTIN 600 MG: 300 CAPSULE ORAL at 03:04

## 2025-04-15 NOTE — ASSESSMENT & PLAN NOTE
Patient's most recent potassium results are listed below.   Recent Labs     04/13/25  0451 04/14/25  0541 04/15/25  0639   K 3.9 3.8 4.2     EKG showed normal sinus rhythm.    Plan  - Replete potassium per protocol  - Monitor potassium Daily  - Patient's hypokalemia is stable

## 2025-04-15 NOTE — ASSESSMENT & PLAN NOTE
Patient's hemorrhage is due to trauma/laceration, this bleeding is associated with an anticoagulant, the anticoagulant is Select Anticoagulant(s): Warfarin/Coumadin. Patients most recent Hgb, Hct, platelets, and INR are listed below.  Recent Labs     04/13/25  0451 04/13/25  1133 04/14/25  0541 04/14/25  1153 04/14/25  2008 04/15/25  0639   HGB 8.0*   < > 8.2* 7.9* 7.8* 7.7*   HCT 25.0*   < > 25.6* 24.6* 24.3* 23.7*   *   < > 476* 471* 476* 440   INR 1.1  --  1.2  --   --  1.5*    < > = values in this interval not displayed.     Plan  - Will trend hemoglobin/hematocrit Daily  - Will monitor and correct any coagulation defects  - Will transfuse if Hgb is <7g/dl (<8g/dl in cases of active ACS) or if patient has rapid bleeding leading to hemodynamic instability  - Compression wrap placed in the ED

## 2025-04-15 NOTE — SUBJECTIVE & OBJECTIVE
Interval History: Pt is reporting continue pain likely related to SS. Will maintain pain management meds as is.    Review of Systems   Constitutional:  Negative for chills and fever.   Respiratory:  Negative for cough and shortness of breath.    Cardiovascular:  Positive for chest pain (breast pain). Negative for palpitations and leg swelling.   Gastrointestinal:  Negative for abdominal distention, abdominal pain, diarrhea, nausea and vomiting.   Genitourinary:  Negative for flank pain.   Musculoskeletal:  Positive for arthralgias and myalgias.   Skin:  Negative for wound.   Neurological:  Negative for dizziness and headaches.   Psychiatric/Behavioral:  Negative for confusion, decreased concentration and dysphoric mood.      Objective:     Vital Signs (Most Recent):  Temp: 98.3 °F (36.8 °C) (04/15/25 0746)  Pulse: 98 (04/15/25 0746)  Resp: 20 (04/15/25 0822)  BP: 118/84 (04/15/25 0746)  SpO2: (!) 92 % (04/15/25 0746) Vital Signs (24h Range):  Temp:  [98.2 °F (36.8 °C)-99.2 °F (37.3 °C)] 98.3 °F (36.8 °C)  Pulse:  [88-98] 98  Resp:  [17-20] 20  SpO2:  [92 %-97 %] 92 %  BP: (118-159)/(75-98) 118/84     Weight: 93.9 kg (207 lb 0.2 oz)  Body mass index is 37.86 kg/m².    Intake/Output Summary (Last 24 hours) at 4/15/2025 1037  Last data filed at 4/15/2025 0604  Gross per 24 hour   Intake 480 ml   Output --   Net 480 ml         Physical Exam  Vitals reviewed.   Constitutional:       General: She is not in acute distress.  HENT:      Head: Normocephalic and atraumatic.      Nose: Nose normal.   Eyes:      General:         Right eye: No discharge.         Left eye: No discharge.   Cardiovascular:      Rate and Rhythm: Normal rate and regular rhythm.   Pulmonary:      Effort: Pulmonary effort is normal. No respiratory distress.      Breath sounds: No stridor.   Abdominal:      General: Abdomen is flat.      Palpations: Abdomen is soft.   Musculoskeletal:         General: Normal range of motion.      Cervical back: Normal  range of motion.      Comments: Left shoulder abrasion  Left forearm hematoma and swelling   Various abrasions on knees    Skin:     General: Skin is warm and dry.      Comments: Right breast hematoma with tenderness, stable    Neurological:      General: No focal deficit present.      Mental Status: She is alert and oriented to person, place, and time.      Comments: AOx3  Moving all extremities  CN grossly intact  No midline spine tenderness      Psychiatric:         Mood and Affect: Mood normal.         Behavior: Behavior normal.               Significant Labs: All pertinent labs within the past 24 hours have been reviewed.    Significant Imaging: I have reviewed all pertinent imaging results/findings within the past 24 hours.
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
## 2025-04-15 NOTE — PLAN OF CARE
Problem: Adult Inpatient Plan of Care  Goal: Plan of Care Review  Outcome: Progressing  Goal: Patient-Specific Goal (Individualized)  Outcome: Progressing  Goal: Absence of Hospital-Acquired Illness or Injury  Outcome: Progressing  Goal: Optimal Comfort and Wellbeing  Outcome: Progressing  Goal: Readiness for Transition of Care  Outcome: Progressing     Problem: Acute Kidney Injury/Impairment  Goal: Fluid and Electrolyte Balance  Outcome: Met  Goal: Improved Oral Intake  Outcome: Met  Goal: Effective Renal Function  Outcome: Met     Problem: Infection  Goal: Absence of Infection Signs and Symptoms  Outcome: Progressing     Problem: Pain Acute  Goal: Optimal Pain Control and Function  Outcome: Progressing     Problem: Pain Chronic (Persistent)  Goal: Optimal Pain Control and Function  Outcome: Progressing     Problem: Sickle Cell Disease  Goal: Optimal Cerebral Tissue Perfusion  Outcome: Progressing  Goal: Optimal Coping with Sickle Cell Disease  Outcome: Progressing  Goal: Effective Tissue Perfusion  Outcome: Progressing  Goal: Absence of Infection Signs and Symptoms  Outcome: Progressing  Goal: Optimal Pain Control and Function  Outcome: Progressing  Goal: Optimal Oxygenation  Outcome: Progressing     Problem: Fall Injury Risk  Goal: Absence of Fall and Fall-Related Injury  Outcome: Progressing      Plan of care and expectation from staff discussed with patient. Patient out of bed today to bathroom. Pain management in place. Patient tolerating meals today, denies nausea.

## 2025-04-15 NOTE — PLAN OF CARE
[facility] - Internal Medicine Telemetry  Discharge Reassessment    Primary Care Provider: No, Primary Doctor    Expected Discharge Date: 4/17/2025    Reassessment (most recent)       Discharge Reassessment - 04/15/25 1704          Discharge Reassessment    Assessment Type Discharge Planning Reassessment     Did the patient's condition or plan change since previous assessment? No     Discharge Plan discussed with: Patient     Communicated ALYSE with patient/caregiver Yes     Discharge Plan A Home with family;Home Health     Discharge Plan B Home with family (P)      DME Needed Upon Discharge  -- (P)    TBD    Transition of Care Barriers None (P)      Why the patient remains in the hospital Requires continued medical care (P)         Post-Acute Status    Post-Acute Authorization Home Health (P)      Home Health Status Pending medical clearance/testing (P)    [facility] accepts/is following    Hospital Resources/Appts/Education Provided Provided education on problems/symptoms using teachback (P)                  Discharge Plan A and Plan B have been determined by review of patient's clinical status, future medical and therapeutic needs, and coverage/benefits for post-acute care in coordination with multidisciplinary team members.                   Nick Francisco, ADRIEN, LMSW  [facility]  Case Management  Ext. [phone]

## 2025-04-15 NOTE — CONSULTS
PHARMACY CONSULT NOTE: WARFARIN  Nazanin Malone is a 45 y.o. female on warfarin therapy for History of DVT/PE. PharmD has been consulted for warfarin dosing.     Current order: 2.5 mg daily  Home dose: 2.5 mg daily  Coumadin clinic enrollment: Active  INR goal: 2-3    Lab Results   Component Value Date    INR 1.5 (H) 04/15/2025    INR 1.2 04/14/2025    INR 1.1 04/13/2025     Significant drug interactions: none at this time  Diet: Adult Regular     Recommendation(s):   Patient's INR noted to be 7.9 on 4/10 coumadin clinic visit - seems patient received 5 mg MW (4/7, 4/9) prior to INR increasing per 4/7 note   At admit on 4/11, INR was 8.3 - patient was given vitamin K which will provide some resistance with increasing the INR   Patient given 5 mg Sunday 4/13 - hesitate to give another dose of 5 mg given the previous INR trend   INR trending upward with 2.5 mg daily - plan to c/w 2.5 mg today and follow up with INR trend 4/16; appropriate to c/w lovenox as bridge. May need 1.25 mg daily starting Wednesday.  It appears patient previously failed Eliquis per August 2024 note - she was on 5 mg BID and experienced a DVT   Pharmacy will continue to follow and monitor warfarin    Thank you for the consult,  Bubba Barriga, PharmD, BCPS   Ext. [phone]    **Note: Consults are reviewed Monday-Friday 7:00am-3:30pm. THE ABOVE RECOMMENDATIONS ARE ONLY SUGGESTED.THE RECOMMENDATIONS SHOULD BE CONSIDERED IN CONJUNCTION WITH ALL PATIENT FACTORS. **

## 2025-04-15 NOTE — PLAN OF CARE
Problem: Adult Inpatient Plan of Care  Goal: Plan of Care Review  Outcome: Progressing  Goal: Patient-Specific Goal (Individualized)  Outcome: Progressing  Goal: Absence of Hospital-Acquired Illness or Injury  Outcome: Progressing  Goal: Optimal Comfort and Wellbeing  Outcome: Progressing  Goal: Readiness for Transition of Care  Outcome: Progressing     Problem: Pain Acute  Goal: Optimal Pain Control and Function  Outcome: Progressing     Problem: Fall Injury Risk  Goal: Absence of Fall and Fall-Related Injury  Outcome: Progressing       No acute events this shift. Pt AAO x 4, VS stable. Pain medication given as needed. IVF cont. @ 50 mL/hr. Pt with no other complaints. Side rails up x 2, bed locked and low, call light within reach. Plan of care ongoing.

## 2025-04-16 LAB
ABSOLUTE EOSINOPHIL (OHS): 0.47 K/UL
ABSOLUTE EOSINOPHIL (OHS): 0.48 K/UL
ABSOLUTE EOSINOPHIL (OHS): 0.51 K/UL
ABSOLUTE MONOCYTE (OHS): 1.27 K/UL (ref 0.3–1)
ABSOLUTE MONOCYTE (OHS): 1.36 K/UL (ref 0.3–1)
ABSOLUTE MONOCYTE (OHS): 1.36 K/UL (ref 0.3–1)
ABSOLUTE NEUTROPHIL COUNT (OHS): 4.19 K/UL (ref 1.8–7.7)
ABSOLUTE NEUTROPHIL COUNT (OHS): 4.26 K/UL (ref 1.8–7.7)
ABSOLUTE NEUTROPHIL COUNT (OHS): 4.36 K/UL (ref 1.8–7.7)
ALBUMIN SERPL BCP-MCNC: 3.4 G/DL (ref 3.5–5.2)
ALP SERPL-CCNC: 93 UNIT/L (ref 40–150)
ALT SERPL W/O P-5'-P-CCNC: 11 UNIT/L (ref 10–44)
ANION GAP (OHS): 9 MMOL/L (ref 8–16)
ANISOCYTOSIS BLD QL SMEAR: SLIGHT
AST SERPL-CCNC: 17 UNIT/L (ref 11–45)
BASOPHILS # BLD AUTO: 0.07 K/UL
BASOPHILS NFR BLD AUTO: 0.7 %
BILIRUB SERPL-MCNC: 0.5 MG/DL (ref 0.1–1)
BUN SERPL-MCNC: 16 MG/DL (ref 6–20)
CALCIUM SERPL-MCNC: 9 MG/DL (ref 8.7–10.5)
CHLORIDE SERPL-SCNC: 104 MMOL/L (ref 95–110)
CO2 SERPL-SCNC: 21 MMOL/L (ref 23–29)
CREAT SERPL-MCNC: 1.3 MG/DL (ref 0.5–1.4)
DACRYOCYTES BLD QL SMEAR: NORMAL
ERYTHROCYTE [DISTWIDTH] IN BLOOD BY AUTOMATED COUNT: 22.2 % (ref 11.5–14.5)
ERYTHROCYTE [DISTWIDTH] IN BLOOD BY AUTOMATED COUNT: 22.3 % (ref 11.5–14.5)
ERYTHROCYTE [DISTWIDTH] IN BLOOD BY AUTOMATED COUNT: 22.5 % (ref 11.5–14.5)
GFR SERPLBLD CREATININE-BSD FMLA CKD-EPI: 51 ML/MIN/1.73/M2
GLUCOSE SERPL-MCNC: 106 MG/DL (ref 70–110)
HCT VFR BLD AUTO: 24 % (ref 37–48.5)
HCT VFR BLD AUTO: 24.4 % (ref 37–48.5)
HCT VFR BLD AUTO: 25.1 % (ref 37–48.5)
HGB BLD-MCNC: 7.7 GM/DL (ref 12–16)
HGB BLD-MCNC: 7.9 GM/DL (ref 12–16)
HGB BLD-MCNC: 8 GM/DL (ref 12–16)
IMM GRANULOCYTES # BLD AUTO: 0.02 K/UL (ref 0–0.04)
IMM GRANULOCYTES # BLD AUTO: 0.03 K/UL (ref 0–0.04)
IMM GRANULOCYTES # BLD AUTO: 0.03 K/UL (ref 0–0.04)
IMM GRANULOCYTES NFR BLD AUTO: 0.2 % (ref 0–0.5)
IMM GRANULOCYTES NFR BLD AUTO: 0.3 % (ref 0–0.5)
IMM GRANULOCYTES NFR BLD AUTO: 0.3 % (ref 0–0.5)
INR PPP: 1.8 (ref 0.8–1.2)
LYMPHOCYTES # BLD AUTO: 3.55 K/UL (ref 1–4.8)
LYMPHOCYTES # BLD AUTO: 4.09 K/UL (ref 1–4.8)
LYMPHOCYTES # BLD AUTO: 4.42 K/UL (ref 1–4.8)
MAGNESIUM SERPL-MCNC: 1.9 MG/DL (ref 1.6–2.6)
MCH RBC QN AUTO: 21.9 PG (ref 27–31)
MCH RBC QN AUTO: 22.1 PG (ref 27–31)
MCH RBC QN AUTO: 22.5 PG (ref 27–31)
MCHC RBC AUTO-ENTMCNC: 31.5 G/DL (ref 32–36)
MCHC RBC AUTO-ENTMCNC: 32.1 G/DL (ref 32–36)
MCHC RBC AUTO-ENTMCNC: 32.8 G/DL (ref 32–36)
MCV RBC AUTO: 69 FL (ref 82–98)
MCV RBC AUTO: 69 FL (ref 82–98)
MCV RBC AUTO: 70 FL (ref 82–98)
NUCLEATED RBC (/100WBC) (OHS): 4 /100 WBC
NUCLEATED RBC (/100WBC) (OHS): 5 /100 WBC
NUCLEATED RBC (/100WBC) (OHS): 5 /100 WBC
OVALOCYTES BLD QL SMEAR: NORMAL
PHOSPHATE SERPL-MCNC: 3.4 MG/DL (ref 2.7–4.5)
PLATELET # BLD AUTO: 364 K/UL (ref 150–450)
PLATELET # BLD AUTO: 419 K/UL (ref 150–450)
PLATELET # BLD AUTO: 448 K/UL (ref 150–450)
PLATELET BLD QL SMEAR: NORMAL
PMV BLD AUTO: 10 FL (ref 9.2–12.9)
PMV BLD AUTO: 10.1 FL (ref 9.2–12.9)
PMV BLD AUTO: 9.8 FL (ref 9.2–12.9)
POIKILOCYTOSIS BLD QL SMEAR: SLIGHT
POLYCHROMASIA BLD QL SMEAR: NORMAL
POTASSIUM SERPL-SCNC: 4.3 MMOL/L (ref 3.5–5.1)
PROT SERPL-MCNC: 7.3 GM/DL (ref 6–8.4)
PROTHROMBIN TIME: 19.3 SECONDS (ref 9–12.5)
RBC # BLD AUTO: 3.49 M/UL (ref 4–5.4)
RBC # BLD AUTO: 3.55 M/UL (ref 4–5.4)
RBC # BLD AUTO: 3.61 M/UL (ref 4–5.4)
RELATIVE EOSINOPHIL (OHS): 4.6 %
RELATIVE EOSINOPHIL (OHS): 4.8 %
RELATIVE EOSINOPHIL (OHS): 4.9 %
RELATIVE LYMPHOCYTE (OHS): 36 % (ref 18–48)
RELATIVE LYMPHOCYTE (OHS): 40.4 % (ref 18–48)
RELATIVE LYMPHOCYTE (OHS): 41.5 % (ref 18–48)
RELATIVE MONOCYTE (OHS): 12.5 % (ref 4–15)
RELATIVE MONOCYTE (OHS): 12.8 % (ref 4–15)
RELATIVE MONOCYTE (OHS): 13.8 % (ref 4–15)
RELATIVE NEUTROPHIL (OHS): 40 % (ref 38–73)
RELATIVE NEUTROPHIL (OHS): 41.5 % (ref 38–73)
RELATIVE NEUTROPHIL (OHS): 44.3 % (ref 38–73)
SCHISTOCYTES BLD QL SMEAR: NORMAL
SODIUM SERPL-SCNC: 134 MMOL/L (ref 136–145)
TARGETS BLD QL SMEAR: NORMAL
WBC # BLD AUTO: 10.12 K/UL (ref 3.9–12.7)
WBC # BLD AUTO: 10.64 K/UL (ref 3.9–12.7)
WBC # BLD AUTO: 9.85 K/UL (ref 3.9–12.7)

## 2025-04-16 PROCEDURE — 63600175 PHARM REV CODE 636 W HCPCS: Performed by: PHYSICIAN ASSISTANT

## 2025-04-16 PROCEDURE — 36415 COLL VENOUS BLD VENIPUNCTURE: CPT

## 2025-04-16 PROCEDURE — 85610 PROTHROMBIN TIME: CPT

## 2025-04-16 PROCEDURE — 84100 ASSAY OF PHOSPHORUS: CPT

## 2025-04-16 PROCEDURE — 25000003 PHARM REV CODE 250

## 2025-04-16 PROCEDURE — 80053 COMPREHEN METABOLIC PANEL: CPT

## 2025-04-16 PROCEDURE — 85025 COMPLETE CBC W/AUTO DIFF WBC: CPT

## 2025-04-16 PROCEDURE — 83735 ASSAY OF MAGNESIUM: CPT

## 2025-04-16 PROCEDURE — 11000001 HC ACUTE MED/SURG PRIVATE ROOM

## 2025-04-16 PROCEDURE — 25000003 PHARM REV CODE 250: Performed by: STUDENT IN AN ORGANIZED HEALTH CARE EDUCATION/TRAINING PROGRAM

## 2025-04-16 PROCEDURE — 25000003 PHARM REV CODE 250: Performed by: PHYSICIAN ASSISTANT

## 2025-04-16 RX ORDER — HYDROMORPHONE HYDROCHLORIDE 1 MG/ML
1 INJECTION, SOLUTION INTRAMUSCULAR; INTRAVENOUS; SUBCUTANEOUS ONCE
Status: COMPLETED | OUTPATIENT
Start: 2025-04-16 | End: 2025-04-16

## 2025-04-16 RX ADMIN — DIPHENHYDRAMINE HYDROCHLORIDE 25 MG: 50 INJECTION, SOLUTION INTRAMUSCULAR; INTRAVENOUS at 09:04

## 2025-04-16 RX ADMIN — HYDROMORPHONE HYDROCHLORIDE 2 MG: 2 INJECTION INTRAMUSCULAR; INTRAVENOUS; SUBCUTANEOUS at 03:04

## 2025-04-16 RX ADMIN — HYDROMORPHONE HYDROCHLORIDE 2 MG: 2 INJECTION INTRAMUSCULAR; INTRAVENOUS; SUBCUTANEOUS at 06:04

## 2025-04-16 RX ADMIN — AMLODIPINE BESYLATE 10 MG: 10 TABLET ORAL at 08:04

## 2025-04-16 RX ADMIN — METOCLOPRAMIDE 5 MG: 5 TABLET ORAL at 08:04

## 2025-04-16 RX ADMIN — METHOCARBAMOL 500 MG: 500 TABLET ORAL at 08:04

## 2025-04-16 RX ADMIN — DIPHENHYDRAMINE HYDROCHLORIDE 25 MG: 50 INJECTION, SOLUTION INTRAMUSCULAR; INTRAVENOUS at 12:04

## 2025-04-16 RX ADMIN — WARFARIN SODIUM 2.5 MG: 2.5 TABLET ORAL at 05:04

## 2025-04-16 RX ADMIN — MUPIROCIN: 20 OINTMENT TOPICAL at 08:04

## 2025-04-16 RX ADMIN — FLUOXETINE HYDROCHLORIDE 80 MG: 20 CAPSULE ORAL at 08:04

## 2025-04-16 RX ADMIN — ENOXAPARIN SODIUM 90 MG: 100 INJECTION SUBCUTANEOUS at 02:04

## 2025-04-16 RX ADMIN — HYDROCODONE BITARTRATE AND ACETAMINOPHEN 1 TABLET: 10; 325 TABLET ORAL at 05:04

## 2025-04-16 RX ADMIN — DIPHENHYDRAMINE HYDROCHLORIDE 25 MG: 50 INJECTION, SOLUTION INTRAMUSCULAR; INTRAVENOUS at 03:04

## 2025-04-16 RX ADMIN — SUCRALFATE 1 G: 1 TABLET ORAL at 08:04

## 2025-04-16 RX ADMIN — PANTOPRAZOLE SODIUM 40 MG: 40 TABLET, DELAYED RELEASE ORAL at 08:04

## 2025-04-16 RX ADMIN — LACTULOSE 20 G: 20 SOLUTION ORAL at 08:04

## 2025-04-16 RX ADMIN — DIPHENHYDRAMINE HYDROCHLORIDE 25 MG: 50 INJECTION, SOLUTION INTRAMUSCULAR; INTRAVENOUS at 06:04

## 2025-04-16 RX ADMIN — SUCRALFATE 1 G: 1 TABLET ORAL at 11:04

## 2025-04-16 RX ADMIN — GABAPENTIN 600 MG: 300 CAPSULE ORAL at 03:04

## 2025-04-16 RX ADMIN — HYDROCODONE BITARTRATE AND ACETAMINOPHEN 1 TABLET: 10; 325 TABLET ORAL at 02:04

## 2025-04-16 RX ADMIN — HYDROCODONE BITARTRATE AND ACETAMINOPHEN 1 TABLET: 10; 325 TABLET ORAL at 10:04

## 2025-04-16 RX ADMIN — HYDROMORPHONE HYDROCHLORIDE 1 MG: 1 INJECTION, SOLUTION INTRAMUSCULAR; INTRAVENOUS; SUBCUTANEOUS at 11:04

## 2025-04-16 RX ADMIN — HYDROMORPHONE HYDROCHLORIDE 2 MG: 2 INJECTION INTRAMUSCULAR; INTRAVENOUS; SUBCUTANEOUS at 09:04

## 2025-04-16 RX ADMIN — SUCRALFATE 1 G: 1 TABLET ORAL at 03:04

## 2025-04-16 RX ADMIN — MORPHINE SULFATE 30 MG: 30 TABLET, EXTENDED RELEASE ORAL at 08:04

## 2025-04-16 RX ADMIN — HYDROMORPHONE HYDROCHLORIDE 2 MG: 2 INJECTION INTRAMUSCULAR; INTRAVENOUS; SUBCUTANEOUS at 12:04

## 2025-04-16 RX ADMIN — METOCLOPRAMIDE 5 MG: 5 TABLET ORAL at 05:04

## 2025-04-16 RX ADMIN — METHOCARBAMOL 500 MG: 500 TABLET ORAL at 12:04

## 2025-04-16 RX ADMIN — METOCLOPRAMIDE 5 MG: 5 TABLET ORAL at 12:04

## 2025-04-16 RX ADMIN — PRAZOSIN HYDROCHLORIDE 1 MG: 1 CAPSULE ORAL at 08:04

## 2025-04-16 RX ADMIN — HYDROCODONE BITARTRATE AND ACETAMINOPHEN 1 TABLET: 10; 325 TABLET ORAL at 07:04

## 2025-04-16 RX ADMIN — METHOCARBAMOL 500 MG: 500 TABLET ORAL at 05:04

## 2025-04-16 RX ADMIN — FOLIC ACID 1 MG: 1 TABLET ORAL at 08:04

## 2025-04-16 RX ADMIN — SUCRALFATE 1 G: 1 TABLET ORAL at 06:04

## 2025-04-16 RX ADMIN — GABAPENTIN 600 MG: 300 CAPSULE ORAL at 08:04

## 2025-04-16 RX ADMIN — MORPHINE SULFATE 30 MG: 30 TABLET, EXTENDED RELEASE ORAL at 03:04

## 2025-04-16 RX ADMIN — HYDROCODONE BITARTRATE AND ACETAMINOPHEN 1 TABLET: 10; 325 TABLET ORAL at 09:04

## 2025-04-16 NOTE — PLAN OF CARE
Problem: Adult Inpatient Plan of Care  Goal: Plan of Care Review  Outcome: Progressing  Goal: Patient-Specific Goal (Individualized)  Outcome: Progressing  Goal: Absence of Hospital-Acquired Illness or Injury  Outcome: Progressing  Goal: Optimal Comfort and Wellbeing  Outcome: Progressing  Goal: Readiness for Transition of Care  Outcome: Progressing     Problem: Pain Acute  Goal: Optimal Pain Control and Function  Outcome: Progressing     Problem: Pain Chronic (Persistent)  Goal: Optimal Pain Control and Function  Outcome: Progressing     Problem: Fall Injury Risk  Goal: Absence of Fall and Fall-Related Injury  Outcome: Progressing     Problem: Sickle Cell Disease  Goal: Optimal Cerebral Tissue Perfusion  Outcome: Progressing  Goal: Optimal Coping with Sickle Cell Disease  Outcome: Progressing  Goal: Effective Tissue Perfusion  Outcome: Progressing  Goal: Absence of Infection Signs and Symptoms  Outcome: Progressing  Goal: Optimal Pain Control and Function  Outcome: Progressing  Goal: Optimal Oxygenation  Outcome: Progressing

## 2025-04-16 NOTE — ASSESSMENT & PLAN NOTE
Patient's hemorrhage is due to trauma/laceration, this bleeding is associated with an anticoagulant, the anticoagulant is Select Anticoagulant(s): Warfarin/Coumadin. Patients most recent Hgb, Hct, platelets, and INR are listed below.  Recent Labs     04/14/25  0541 04/14/25  1153 04/15/25  0639 04/15/25  1352 04/15/25  2012 04/16/25  0519   HGB 8.2*   < > 7.7* 8.2* 7.7* 7.9*   HCT 25.6*   < > 23.7* 26.3* 24.1* 25.1*   *   < > 440 467* 422 364   INR 1.2  --  1.5*  --   --  1.8*    < > = values in this interval not displayed.     Plan  - Will trend hemoglobin/hematocrit Daily  - Will monitor and correct any coagulation defects  - Will transfuse if Hgb is <7g/dl (<8g/dl in cases of active ACS) or if patient has rapid bleeding leading to hemodynamic instability  - Compression wrap placed in the ED

## 2025-04-16 NOTE — CONSULTS
Hospital Medicine Pharmacy Consult Note      Pharmacy to review dose of warfarin  Nazanin Malone is a 45 y.o. female on warfarin therapy for DVT/PE. PharmD has been consulted for warfarin dosing.    Current order:  2.5 mg daily   Home dose:  Warfarin 2.5 mg daily (adjusted 4/11 when patient's INR noted to be 7.9 after warfarin 5 mg MW (4/7, 4/9))  Coumadin clinic enrollment: Active  INR goal: 2-3    Lab Results   Component Value Date    INR 1.8 (H) 04/16/2025    INR 1.5 (H) 04/15/2025    INR 1.2 04/14/2025    INR 1.1 04/13/2025    INR 2.2 (H) 04/12/2025    INR 8.3 (HH) 04/11/2025    INR 7.9 (HH) 04/10/2025     Significant drug interactions: none  Diet: Adult Regular without supplements     Recommendation(s):   4/11 pt received vitamin K 10 mg IV- which will provide some resistance with increasing the INR  Warfarin 5 mg 4/13, Warfarin 2.5 mg 4/14, 4/15  Plan to c/w 2.5 mg today and follow up with INR trend 4/16  May decrease need 1.25 mg daily starting Thursday  c/w lovenox ~1 mg/kg SQ BID as bridge  It appears patient previously failed Eliquis per August 2024 note - she was on 5 mg BID and experienced a DVT  Pharmacy will continue to follow and monitor warfarin      Thank you for the consult,  Brittney Damon  Extension [phone]    **Note: Consults are reviewed Monday-Friday 7:00am-3:30pm. The above recommendations are only suggested. The recommendations should be considered in conjunction with all patient factors.**
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
## 2025-04-16 NOTE — SUBJECTIVE & OBJECTIVE
Interval History: Pt reports worsening breast pain today. The hematoma is large and changing color as expected. INR 1.8 today, continue AC per pharm. Continuing IV pain meds for SS crisis. Reports pain is still severe and requiring prn dilaudid around the clock. Will wean as tolerated      Review of Systems   Constitutional:  Negative for chills and fever.   Respiratory:  Negative for cough and shortness of breath.    Cardiovascular:  Positive for chest pain (breast pain). Negative for palpitations and leg swelling.   Gastrointestinal:  Negative for abdominal distention, abdominal pain, diarrhea, nausea and vomiting.   Genitourinary:  Negative for flank pain.   Musculoskeletal:  Positive for arthralgias and myalgias.   Skin:  Negative for wound.   Neurological:  Negative for dizziness and headaches.   Psychiatric/Behavioral:  Negative for confusion, decreased concentration and dysphoric mood.      Objective:     Vital Signs (Most Recent):  Temp: 99 °F (37.2 °C) (04/16/25 0824)  Pulse: 101 (04/16/25 1055)  Resp: 16 (04/16/25 1212)  BP: 130/83 (04/16/25 1055)  SpO2: 96 % (04/16/25 1055) Vital Signs (24h Range):  Temp:  [98.1 °F (36.7 °C)-99 °F (37.2 °C)] 99 °F (37.2 °C)  Pulse:  [] 101  Resp:  [15-20] 16  SpO2:  [93 %-96 %] 96 %  BP: (130-166)/() 130/83     Weight: 93.9 kg (207 lb 0.2 oz)  Body mass index is 37.86 kg/m².    Intake/Output Summary (Last 24 hours) at 4/16/2025 1229  Last data filed at 4/16/2025 0631  Gross per 24 hour   Intake 840 ml   Output --   Net 840 ml         Physical Exam  Vitals reviewed.   Constitutional:       General: She is not in acute distress.  HENT:      Head: Normocephalic and atraumatic.      Nose: Nose normal.   Eyes:      General:         Right eye: No discharge.         Left eye: No discharge.   Cardiovascular:      Rate and Rhythm: Normal rate and regular rhythm.   Pulmonary:      Effort: Pulmonary effort is normal. No respiratory distress.      Breath sounds: No  stridor.   Chest:       Abdominal:      General: Abdomen is flat.      Palpations: Abdomen is soft.   Musculoskeletal:         General: Normal range of motion.      Cervical back: Normal range of motion.      Comments: Left shoulder abrasion  Left forearm hematoma and swelling   Various abrasions on knees    Skin:     General: Skin is warm and dry.      Comments: Right breast hematoma with tenderness, worsened   Neurological:      General: No focal deficit present.      Mental Status: She is alert and oriented to person, place, and time.      Comments: AOx3  Moving all extremities  CN grossly intact  No midline spine tenderness      Psychiatric:         Mood and Affect: Mood normal.         Behavior: Behavior normal.             Significant Labs: All pertinent labs within the past 24 hours have been reviewed.    Significant Imaging: I have reviewed all pertinent imaging results/findings within the past 24 hours.

## 2025-04-16 NOTE — PLAN OF CARE
Problem: Adult Inpatient Plan of Care  Goal: Plan of Care Review  Outcome: Progressing  Goal: Patient-Specific Goal (Individualized)  Outcome: Progressing  Goal: Absence of Hospital-Acquired Illness or Injury  Outcome: Progressing  Goal: Optimal Comfort and Wellbeing  Outcome: Progressing  Goal: Readiness for Transition of Care  Outcome: Progressing     Problem: Infection  Goal: Absence of Infection Signs and Symptoms  Outcome: Progressing     Problem: Pain Acute  Goal: Optimal Pain Control and Function  Outcome: Progressing     Problem: Pain Chronic (Persistent)  Goal: Optimal Pain Control and Function  Outcome: Progressing     Problem: Fall Injury Risk  Goal: Absence of Fall and Fall-Related Injury  Outcome: Progressing    Pain medications given as ordered. Patient complained of increased pain and edema to right breast throughout shift, med team informed by this nurse.

## 2025-04-16 NOTE — ASSESSMENT & PLAN NOTE
Patient's most recent potassium results are listed below.   Recent Labs     04/14/25  0541 04/15/25  0639 04/16/25  0519   K 3.8 4.2 4.3     EKG showed normal sinus rhythm.    Plan  - Replete potassium per protocol  - Monitor potassium Daily  - Patient's hypokalemia is stable

## 2025-04-16 NOTE — ASSESSMENT & PLAN NOTE
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 Had been on apixiban and then Lovenox due to insurance coverage issues.  Now on coumadin with supratherapeutic INR.    - INR 2.2 today  - resumed warfarin w/ lovenox bridge per pharmacy  - s/p vit K  - pharm following for coumadin management.    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 Had been on apixiban and then Lovenox due to insurance coverage issues.  Now on coumadin with supratherapeutic INR.    - INR 1.8 today  - resumed warfarin w/ lovenox bridge per pharmacy  - s/p vit K  - pharm following for coumadin management.

## 2025-04-16 NOTE — ASSESSMENT & PLAN NOTE
-continue home hydrocodone-acetomenophen  q4h, morphine 30 mg tid  -IV benadryl  -IV dilaudid q3h PRN for breakthrough pain  -Continuous IV fluid hydration     0

## 2025-04-17 LAB
ABSOLUTE EOSINOPHIL (OHS): 0.37 K/UL
ABSOLUTE EOSINOPHIL (OHS): 0.42 K/UL
ABSOLUTE EOSINOPHIL (OHS): 0.43 K/UL
ABSOLUTE MONOCYTE (OHS): 1.42 K/UL (ref 0.3–1)
ABSOLUTE MONOCYTE (OHS): 1.47 K/UL (ref 0.3–1)
ABSOLUTE MONOCYTE (OHS): 1.58 K/UL (ref 0.3–1)
ABSOLUTE NEUTROPHIL COUNT (OHS): 3.63 K/UL (ref 1.8–7.7)
ABSOLUTE NEUTROPHIL COUNT (OHS): 4.59 K/UL (ref 1.8–7.7)
ABSOLUTE NEUTROPHIL COUNT (OHS): 4.77 K/UL (ref 1.8–7.7)
ALBUMIN SERPL BCP-MCNC: 3.3 G/DL (ref 3.5–5.2)
ALP SERPL-CCNC: 93 UNIT/L (ref 40–150)
ALT SERPL W/O P-5'-P-CCNC: 11 UNIT/L (ref 10–44)
ANION GAP (OHS): 5 MMOL/L (ref 8–16)
AST SERPL-CCNC: 19 UNIT/L (ref 11–45)
BASOPHILS # BLD AUTO: 0.06 K/UL
BASOPHILS # BLD AUTO: 0.06 K/UL
BASOPHILS # BLD AUTO: 0.08 K/UL
BASOPHILS NFR BLD AUTO: 0.6 %
BASOPHILS NFR BLD AUTO: 0.6 %
BASOPHILS NFR BLD AUTO: 0.8 %
BILIRUB SERPL-MCNC: 0.5 MG/DL (ref 0.1–1)
BUN SERPL-MCNC: 14 MG/DL (ref 6–20)
CALCIUM SERPL-MCNC: 8.9 MG/DL (ref 8.7–10.5)
CHLORIDE SERPL-SCNC: 104 MMOL/L (ref 95–110)
CO2 SERPL-SCNC: 28 MMOL/L (ref 23–29)
CREAT SERPL-MCNC: 1.4 MG/DL (ref 0.5–1.4)
ERYTHROCYTE [DISTWIDTH] IN BLOOD BY AUTOMATED COUNT: 22.4 % (ref 11.5–14.5)
ERYTHROCYTE [DISTWIDTH] IN BLOOD BY AUTOMATED COUNT: 22.4 % (ref 11.5–14.5)
ERYTHROCYTE [DISTWIDTH] IN BLOOD BY AUTOMATED COUNT: 23.1 % (ref 11.5–14.5)
GFR SERPLBLD CREATININE-BSD FMLA CKD-EPI: 47 ML/MIN/1.73/M2
GLUCOSE SERPL-MCNC: 114 MG/DL (ref 70–110)
HCT VFR BLD AUTO: 22.3 % (ref 37–48.5)
HCT VFR BLD AUTO: 22.9 % (ref 37–48.5)
HCT VFR BLD AUTO: 25.4 % (ref 37–48.5)
HGB BLD-MCNC: 7.1 GM/DL (ref 12–16)
HGB BLD-MCNC: 7.3 GM/DL (ref 12–16)
HGB BLD-MCNC: 8.1 GM/DL (ref 12–16)
IMM GRANULOCYTES # BLD AUTO: 0.03 K/UL (ref 0–0.04)
IMM GRANULOCYTES # BLD AUTO: 0.04 K/UL (ref 0–0.04)
IMM GRANULOCYTES # BLD AUTO: 0.05 K/UL (ref 0–0.04)
IMM GRANULOCYTES NFR BLD AUTO: 0.3 % (ref 0–0.5)
IMM GRANULOCYTES NFR BLD AUTO: 0.4 % (ref 0–0.5)
IMM GRANULOCYTES NFR BLD AUTO: 0.5 % (ref 0–0.5)
INR PPP: 2.6 (ref 0.8–1.2)
LYMPHOCYTES # BLD AUTO: 3.74 K/UL (ref 1–4.8)
LYMPHOCYTES # BLD AUTO: 3.92 K/UL (ref 1–4.8)
LYMPHOCYTES # BLD AUTO: 4 K/UL (ref 1–4.8)
MAGNESIUM SERPL-MCNC: 1.9 MG/DL (ref 1.6–2.6)
MCH RBC QN AUTO: 22 PG (ref 27–31)
MCH RBC QN AUTO: 22.1 PG (ref 27–31)
MCH RBC QN AUTO: 22.3 PG (ref 27–31)
MCHC RBC AUTO-ENTMCNC: 31.8 G/DL (ref 32–36)
MCHC RBC AUTO-ENTMCNC: 31.9 G/DL (ref 32–36)
MCHC RBC AUTO-ENTMCNC: 31.9 G/DL (ref 32–36)
MCV RBC AUTO: 69 FL (ref 82–98)
MCV RBC AUTO: 69 FL (ref 82–98)
MCV RBC AUTO: 70 FL (ref 82–98)
NUCLEATED RBC (/100WBC) (OHS): 6 /100 WBC
NUCLEATED RBC (/100WBC) (OHS): 6 /100 WBC
NUCLEATED RBC (/100WBC) (OHS): 7 /100 WBC
PHOSPHATE SERPL-MCNC: 2.9 MG/DL (ref 2.7–4.5)
PLATELET # BLD AUTO: 369 K/UL (ref 150–450)
PLATELET # BLD AUTO: 374 K/UL (ref 150–450)
PLATELET # BLD AUTO: 409 K/UL (ref 150–450)
PMV BLD AUTO: 9.4 FL (ref 9.2–12.9)
PMV BLD AUTO: 9.9 FL (ref 9.2–12.9)
PMV BLD AUTO: 9.9 FL (ref 9.2–12.9)
POTASSIUM SERPL-SCNC: 4.2 MMOL/L (ref 3.5–5.1)
PROT SERPL-MCNC: 7.2 GM/DL (ref 6–8.4)
PROTHROMBIN TIME: 26.2 SECONDS (ref 9–12.5)
RBC # BLD AUTO: 3.23 M/UL (ref 4–5.4)
RBC # BLD AUTO: 3.28 M/UL (ref 4–5.4)
RBC # BLD AUTO: 3.66 M/UL (ref 4–5.4)
RELATIVE EOSINOPHIL (OHS): 3.5 %
RELATIVE EOSINOPHIL (OHS): 4 %
RELATIVE EOSINOPHIL (OHS): 4.5 %
RELATIVE LYMPHOCYTE (OHS): 35.3 % (ref 18–48)
RELATIVE LYMPHOCYTE (OHS): 37.5 % (ref 18–48)
RELATIVE LYMPHOCYTE (OHS): 41.6 % (ref 18–48)
RELATIVE MONOCYTE (OHS): 13.6 % (ref 4–15)
RELATIVE MONOCYTE (OHS): 14.9 % (ref 4–15)
RELATIVE MONOCYTE (OHS): 15.3 % (ref 4–15)
RELATIVE NEUTROPHIL (OHS): 37.7 % (ref 38–73)
RELATIVE NEUTROPHIL (OHS): 43.9 % (ref 38–73)
RELATIVE NEUTROPHIL (OHS): 45 % (ref 38–73)
SODIUM SERPL-SCNC: 137 MMOL/L (ref 136–145)
WBC # BLD AUTO: 10.45 K/UL (ref 3.9–12.7)
WBC # BLD AUTO: 10.59 K/UL (ref 3.9–12.7)
WBC # BLD AUTO: 9.62 K/UL (ref 3.9–12.7)

## 2025-04-17 PROCEDURE — 85025 COMPLETE CBC W/AUTO DIFF WBC: CPT

## 2025-04-17 PROCEDURE — 80053 COMPREHEN METABOLIC PANEL: CPT

## 2025-04-17 PROCEDURE — 36415 COLL VENOUS BLD VENIPUNCTURE: CPT

## 2025-04-17 PROCEDURE — 25000003 PHARM REV CODE 250: Performed by: PHYSICIAN ASSISTANT

## 2025-04-17 PROCEDURE — 84100 ASSAY OF PHOSPHORUS: CPT

## 2025-04-17 PROCEDURE — 11000001 HC ACUTE MED/SURG PRIVATE ROOM

## 2025-04-17 PROCEDURE — 63600175 PHARM REV CODE 636 W HCPCS: Performed by: INTERNAL MEDICINE

## 2025-04-17 PROCEDURE — 63600175 PHARM REV CODE 636 W HCPCS: Performed by: PHYSICIAN ASSISTANT

## 2025-04-17 PROCEDURE — 83735 ASSAY OF MAGNESIUM: CPT

## 2025-04-17 PROCEDURE — 85610 PROTHROMBIN TIME: CPT

## 2025-04-17 PROCEDURE — 25000003 PHARM REV CODE 250

## 2025-04-17 RX ORDER — SODIUM CHLORIDE, SODIUM LACTATE, POTASSIUM CHLORIDE, CALCIUM CHLORIDE 600; 310; 30; 20 MG/100ML; MG/100ML; MG/100ML; MG/100ML
INJECTION, SOLUTION INTRAVENOUS CONTINUOUS
Status: ACTIVE | OUTPATIENT
Start: 2025-04-17 | End: 2025-04-17

## 2025-04-17 RX ORDER — WARFARIN 2.5 MG/1
2.5 TABLET ORAL DAILY
Status: DISCONTINUED | OUTPATIENT
Start: 2025-04-18 | End: 2025-04-28 | Stop reason: HOSPADM

## 2025-04-17 RX ADMIN — GABAPENTIN 600 MG: 300 CAPSULE ORAL at 09:04

## 2025-04-17 RX ADMIN — ENOXAPARIN SODIUM 90 MG: 100 INJECTION SUBCUTANEOUS at 02:04

## 2025-04-17 RX ADMIN — DIPHENHYDRAMINE HYDROCHLORIDE 25 MG: 50 INJECTION, SOLUTION INTRAMUSCULAR; INTRAVENOUS at 08:04

## 2025-04-17 RX ADMIN — HYDROMORPHONE HYDROCHLORIDE 2 MG: 2 INJECTION INTRAMUSCULAR; INTRAVENOUS; SUBCUTANEOUS at 08:04

## 2025-04-17 RX ADMIN — PANTOPRAZOLE SODIUM 40 MG: 40 TABLET, DELAYED RELEASE ORAL at 09:04

## 2025-04-17 RX ADMIN — METHOCARBAMOL 500 MG: 500 TABLET ORAL at 09:04

## 2025-04-17 RX ADMIN — MORPHINE SULFATE 30 MG: 30 TABLET, EXTENDED RELEASE ORAL at 09:04

## 2025-04-17 RX ADMIN — MORPHINE SULFATE 30 MG: 30 TABLET, EXTENDED RELEASE ORAL at 08:04

## 2025-04-17 RX ADMIN — HYDROMORPHONE HYDROCHLORIDE 2 MG: 2 INJECTION INTRAMUSCULAR; INTRAVENOUS; SUBCUTANEOUS at 11:04

## 2025-04-17 RX ADMIN — GABAPENTIN 600 MG: 300 CAPSULE ORAL at 08:04

## 2025-04-17 RX ADMIN — SUCRALFATE 1 G: 1 TABLET ORAL at 09:04

## 2025-04-17 RX ADMIN — METHOCARBAMOL 500 MG: 500 TABLET ORAL at 08:04

## 2025-04-17 RX ADMIN — DIPHENHYDRAMINE HYDROCHLORIDE 25 MG: 50 INJECTION, SOLUTION INTRAMUSCULAR; INTRAVENOUS at 12:04

## 2025-04-17 RX ADMIN — PRAZOSIN HYDROCHLORIDE 1 MG: 1 CAPSULE ORAL at 08:04

## 2025-04-17 RX ADMIN — HYDROMORPHONE HYDROCHLORIDE 2 MG: 2 INJECTION INTRAMUSCULAR; INTRAVENOUS; SUBCUTANEOUS at 12:04

## 2025-04-17 RX ADMIN — METOCLOPRAMIDE 5 MG: 5 TABLET ORAL at 09:04

## 2025-04-17 RX ADMIN — METOCLOPRAMIDE 5 MG: 5 TABLET ORAL at 05:04

## 2025-04-17 RX ADMIN — FOLIC ACID 1 MG: 1 TABLET ORAL at 09:04

## 2025-04-17 RX ADMIN — DIPHENHYDRAMINE HYDROCHLORIDE 25 MG: 50 INJECTION, SOLUTION INTRAMUSCULAR; INTRAVENOUS at 02:04

## 2025-04-17 RX ADMIN — DIPHENHYDRAMINE HYDROCHLORIDE 25 MG: 50 INJECTION, SOLUTION INTRAMUSCULAR; INTRAVENOUS at 11:04

## 2025-04-17 RX ADMIN — METHOCARBAMOL 500 MG: 500 TABLET ORAL at 11:04

## 2025-04-17 RX ADMIN — SUCRALFATE 1 G: 1 TABLET ORAL at 08:04

## 2025-04-17 RX ADMIN — LACTULOSE 20 G: 20 SOLUTION ORAL at 09:04

## 2025-04-17 RX ADMIN — HYDROCODONE BITARTRATE AND ACETAMINOPHEN 1 TABLET: 10; 325 TABLET ORAL at 09:04

## 2025-04-17 RX ADMIN — HYDROCODONE BITARTRATE AND ACETAMINOPHEN 1 TABLET: 10; 325 TABLET ORAL at 06:04

## 2025-04-17 RX ADMIN — FLUOXETINE HYDROCHLORIDE 80 MG: 20 CAPSULE ORAL at 09:04

## 2025-04-17 RX ADMIN — HYDROMORPHONE HYDROCHLORIDE 2 MG: 2 INJECTION INTRAMUSCULAR; INTRAVENOUS; SUBCUTANEOUS at 05:04

## 2025-04-17 RX ADMIN — HYDROCODONE BITARTRATE AND ACETAMINOPHEN 1 TABLET: 10; 325 TABLET ORAL at 01:04

## 2025-04-17 RX ADMIN — DIPHENHYDRAMINE HYDROCHLORIDE 25 MG: 50 INJECTION, SOLUTION INTRAMUSCULAR; INTRAVENOUS at 06:04

## 2025-04-17 RX ADMIN — METHOCARBAMOL 500 MG: 500 TABLET ORAL at 05:04

## 2025-04-17 RX ADMIN — PANTOPRAZOLE SODIUM 40 MG: 40 TABLET, DELAYED RELEASE ORAL at 08:04

## 2025-04-17 RX ADMIN — METOCLOPRAMIDE 5 MG: 5 TABLET ORAL at 08:04

## 2025-04-17 RX ADMIN — MORPHINE SULFATE 30 MG: 30 TABLET, EXTENDED RELEASE ORAL at 03:04

## 2025-04-17 RX ADMIN — DIPHENHYDRAMINE HYDROCHLORIDE 25 MG: 50 INJECTION, SOLUTION INTRAMUSCULAR; INTRAVENOUS at 05:04

## 2025-04-17 RX ADMIN — SUCRALFATE 1 G: 1 TABLET ORAL at 05:04

## 2025-04-17 RX ADMIN — HYDROCODONE BITARTRATE AND ACETAMINOPHEN 1 TABLET: 10; 325 TABLET ORAL at 11:04

## 2025-04-17 RX ADMIN — GABAPENTIN 600 MG: 300 CAPSULE ORAL at 03:04

## 2025-04-17 RX ADMIN — SUCRALFATE 1 G: 1 TABLET ORAL at 06:04

## 2025-04-17 RX ADMIN — SODIUM CHLORIDE, POTASSIUM CHLORIDE, SODIUM LACTATE AND CALCIUM CHLORIDE: 600; 310; 30; 20 INJECTION, SOLUTION INTRAVENOUS at 09:04

## 2025-04-17 RX ADMIN — METOCLOPRAMIDE 5 MG: 5 TABLET ORAL at 11:04

## 2025-04-17 RX ADMIN — HYDROMORPHONE HYDROCHLORIDE 2 MG: 2 INJECTION INTRAMUSCULAR; INTRAVENOUS; SUBCUTANEOUS at 02:04

## 2025-04-17 RX ADMIN — HYDROCODONE BITARTRATE AND ACETAMINOPHEN 1 TABLET: 10; 325 TABLET ORAL at 02:04

## 2025-04-17 RX ADMIN — AMLODIPINE BESYLATE 10 MG: 10 TABLET ORAL at 09:04
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
## 2025-04-17 NOTE — SUBJECTIVE & OBJECTIVE
Interval History: NAEON. Pt reports worsening breast pain today. Large indurated area with tenderness on exam. INR 2.6 today. R breast US pending.    Review of Systems   Constitutional:  Negative for chills and fever.   Respiratory:  Negative for cough and shortness of breath.    Cardiovascular:  Positive for chest pain (breast pain). Negative for palpitations and leg swelling.   Gastrointestinal:  Negative for abdominal distention, abdominal pain, diarrhea, nausea and vomiting.   Genitourinary:  Negative for flank pain.   Musculoskeletal:  Positive for arthralgias and myalgias.   Skin:  Negative for wound.   Neurological:  Negative for dizziness and headaches.   Psychiatric/Behavioral:  Negative for confusion, decreased concentration and dysphoric mood.      Objective:     Vital Signs (Most Recent):  Temp: 99 °F (37.2 °C) (04/17/25 1139)  Pulse: 107 (04/17/25 1139)  Resp: 18 (04/17/25 1139)  BP: 122/75 (04/17/25 1139)  SpO2: 96 % (04/17/25 1139) Vital Signs (24h Range):  Temp:  [98.7 °F (37.1 °C)-99.5 °F (37.5 °C)] 99 °F (37.2 °C)  Pulse:  [106-119] 107  Resp:  [15-18] 18  SpO2:  [94 %-97 %] 96 %  BP: (120-146)/() 122/75     Weight: 93.9 kg (207 lb 0.2 oz)  Body mass index is 37.86 kg/m².  No intake or output data in the 24 hours ending 04/17/25 1210        Physical Exam  Vitals reviewed.   Constitutional:       General: She is not in acute distress.  HENT:      Head: Normocephalic and atraumatic.      Nose: Nose normal.   Eyes:      General:         Right eye: No discharge.         Left eye: No discharge.   Cardiovascular:      Rate and Rhythm: Normal rate and regular rhythm.   Pulmonary:      Effort: Pulmonary effort is normal. No respiratory distress.      Breath sounds: No stridor.   Chest:       Abdominal:      General: Abdomen is flat.      Palpations: Abdomen is soft.   Musculoskeletal:         General: Normal range of motion.      Cervical back: Normal range of motion.      Comments: Left shoulder  abrasion  Left forearm hematoma and swelling   Various abrasions on knees    Skin:     General: Skin is warm and dry.      Comments: Right breast hematoma with tenderness, worsened   Neurological:      General: No focal deficit present.      Mental Status: She is alert and oriented to person, place, and time.      Comments: AOx3  Moving all extremities  CN grossly intact  No midline spine tenderness      Psychiatric:         Mood and Affect: Mood normal.         Behavior: Behavior normal.               Significant Labs: All pertinent labs within the past 24 hours have been reviewed.    Significant Imaging: I have reviewed all pertinent imaging results/findings within the past 24 hours.
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
## 2025-04-17 NOTE — SUBJECTIVE & OBJECTIVE
Interval History: Largely unchanged physical exam. Hgb 7.3.  No evidence of skin necrosis. Complaining of pain in breast after ultrasound. Still not wearing a compression bra.    Medications:  Continuous Infusions:   lactated ringers   Intravenous Continuous 100 mL/hr at 04/17/25 0948 New Bag at 04/17/25 0948       Scheduled Meds:   amLODIPine  10 mg Oral Daily    diphenhydrAMINE  25 mg Intravenous Q6H    FLUoxetine  80 mg Oral Daily    folic acid  1 mg Oral Daily    gabapentin  600 mg Oral TID    HYDROcodone-acetaminophen  1 tablet Oral Q4H    lactulose  20 g Oral Daily    methocarbamoL  500 mg Oral QID    metoclopramide HCl  5 mg Oral QID    morphine  30 mg Oral TID    mupirocin   Nasal BID    pantoprazole  40 mg Oral BID    prazosin  1 mg Oral QHS    sucralfate  1 g Oral QID (AC & HS)    [START ON 4/18/2025] warfarin  2.5 mg Oral Daily     PRN Meds:  Current Facility-Administered Medications:     dextrose 50%, 12.5 g, Intravenous, PRN    dextrose 50%, 25 g, Intravenous, PRN    diphenhydrAMINE, 25 mg, Intravenous, Q6H PRN    glucagon (human recombinant), 1 mg, Intramuscular, PRN    glucose, 16 g, Oral, PRN    glucose, 24 g, Oral, PRN    HYDROmorphone, 2 mg, Intravenous, Q3H PRN    naloxone, 0.02 mg, Intravenous, PRN    senna-docusate, 1 tablet, Oral, Daily PRN    sodium chloride 0.9%, 10 mL, Intravenous, Q12H PRN     Review of patient's allergies indicates:   Allergen Reactions    Cat dander Anaphylaxis, Itching and Shortness Of Breath    Nsaids (non-steroidal anti-inflammatory drug) Itching and Anaphylaxis    Latex Rash     Objective:     Vital Signs (Most Recent):  Temp: 99.4 °F (37.4 °C) (04/17/25 1513)  Pulse: (!) 111 (04/17/25 1513)  Resp: 16 (04/17/25 1708)  BP: 110/63 (04/17/25 1513)  SpO2: (!) 94 % (04/17/25 1513) Vital Signs (24h Range):  Temp:  [98.7 °F (37.1 °C)-99.5 °F (37.5 °C)] 99.4 °F (37.4 °C)  Pulse:  [106-119] 111  Resp:  [16-18] 16  SpO2:  [94 %-97 %] 94 %  BP: (110-146)/() 110/63      Weight: 93.9 kg (207 lb 0.2 oz)  Body mass index is 37.86 kg/m².    Intake/Output - Last 3 Shifts         04/15 0700  04/16 0659 04/16 0700  04/17 0659 04/17 0700  04/18 0659    P.O. 1080      Total Intake(mL/kg) 1080 (11.5)      Net +1080             Urine Occurrence 1 x               Physical Exam  Vitals reviewed.   Constitutional:       General: She is not in acute distress.  HENT:      Head: Normocephalic and atraumatic.      Nose: Nose normal.   Eyes:      General:         Right eye: No discharge.         Left eye: No discharge.   Cardiovascular:      Rate and Rhythm: Normal rate and regular rhythm.      Comments: 2+ pulses radially and distal lower extremities   Pulmonary:      Effort: Pulmonary effort is normal. No respiratory distress.      Breath sounds: No stridor.   Abdominal:      Comments: Soft, ND, NT  Musculoskeletal:         General: Normal range of motion.      Cervical back: Normal range of motion.      Comments: Left shoulder abrasion  Left forearm hematoma and swelling   Various abrasions on knees    Skin:     General: Skin is warm and dry.      Capillary Refill: Capillary refill takes 2 to 3 seconds.      Comments: Right breast hematoma with tenderness, stable    Neurological:      General: No focal deficit present.      Mental Status: She is alert and oriented to person, place, and time.      Comments: AOx3  Moving all extremities     Psychiatric:         Mood and Affect: Mood normal.         Behavior: Behavior normal.      Significant Labs:  I have reviewed all pertinent lab results within the past 24 hours.  CBC:   Recent Labs   Lab 04/17/25  1120   WBC 10.45   RBC 3.28*   HGB 7.3*   HCT 22.9*      MCV 70*   MCH 22.3*   MCHC 31.9*     CMP:   Recent Labs   Lab 04/17/25  0539   CALCIUM 8.9   ALBUMIN 3.3*      K 4.2   CO2 28      BUN 14   CREATININE 1.4   ALKPHOS 93   ALT 11   AST 19   BILITOT 0.5       Significant Diagnostics:  I have reviewed all pertinent imaging  results/findings within the past 24 hours.
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
## 2025-04-17 NOTE — PLAN OF CARE
Problem: Adult Inpatient Plan of Care  Goal: Plan of Care Review  Outcome: Progressing  Goal: Patient-Specific Goal (Individualized)  Outcome: Progressing  Goal: Absence of Hospital-Acquired Illness or Injury  Outcome: Progressing  Goal: Optimal Comfort and Wellbeing  Outcome: Progressing  Goal: Readiness for Transition of Care  Outcome: Progressing     Problem: Infection  Goal: Absence of Infection Signs and Symptoms  Outcome: Progressing     Problem: Pain Acute  Goal: Optimal Pain Control and Function  Outcome: Progressing     Problem: Pain Chronic (Persistent)  Goal: Optimal Pain Control and Function  Outcome: Progressing     Problem: Fall Injury Risk  Goal: Absence of Fall and Fall-Related Injury  Outcome: Progressing

## 2025-04-17 NOTE — ASSESSMENT & PLAN NOTE
Patient's most recent potassium results are listed below.   Recent Labs     04/15/25  0639 04/16/25  0519 04/17/25  0539   K 4.2 4.3 4.2     EKG showed normal sinus rhythm.    Plan  - Replete potassium per protocol  - Monitor potassium Daily  - Patient's hypokalemia is stable

## 2025-04-17 NOTE — ASSESSMENT & PLAN NOTE
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 Had been on apixiban and then Lovenox due to insurance coverage issues.  Now on coumadin with supratherapeutic INR.    - INR 2.2 today  - resumed warfarin w/ lovenox bridge per pharmacy  - s/p vit K  - pharm following for coumadin management.    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 Had been on apixiban and then Lovenox due to insurance coverage issues.  Now on coumadin with supratherapeutic INR.    - INR 1.8 today  - resumed warfarin w/ lovenox bridge per pharmacy  - s/p vit K  - pharm following for coumadin management.    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 Had been on apixiban and then Lovenox due to insurance coverage issues.  Now on coumadin with supratherapeutic INR.    - INR 2.2 today  - resumed warfarin w/ lovenox bridge per pharmacy  - s/p vit K  - pharm following for coumadin management.    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 Had been on apixiban and then Lovenox due to insurance coverage issues.  Now on coumadin with supratherapeutic INR.    - INR 1.8 today  - resumed warfarin w/ lovenox bridge per pharmacy  - s/p vit K  - pharm following for coumadin management.    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 Had been on apixiban and then Lovenox due to insurance coverage issues.  Now on coumadin with supratherapeutic INR.    - INR 2.2 today  - resumed warfarin w/ lovenox bridge per pharmacy  - s/p vit K  - pharm following for coumadin management.    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 Had been on apixiban and then Lovenox due to insurance coverage issues.  Now on coumadin with supratherapeutic INR.    - INR 1.8 today  - resumed warfarin w/ lovenox bridge per pharmacy  - s/p vit K  - pharm following for coumadin management.    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 Had been on apixiban and then Lovenox due to insurance coverage issues.  Now on coumadin with supratherapeutic INR.    - INR 2.2 today  -  resumed warfarin w/ lovenox bridge per pharmacy  - s/p vit K  - pharm following for coumadin management.    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 Had been on apixiban and then Lovenox due to insurance coverage issues.  Now on coumadin with supratherapeutic INR.    - INR 1.8 today  - resumed warfarin w/ lovenox bridge per pharmacy  - s/p vit K  - pharm following for coumadin management.
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
## 2025-04-17 NOTE — ASSESSMENT & PLAN NOTE
-continue home hydrocodone-acetomenophen  q4h, morphine 30 mg tid  -IV benadryl  -IV dilaudid q3h PRN for breakthrough pain  -IV fluid hydration

## 2025-04-17 NOTE — CONSULTS
Hospital Medicine Pharmacy Consult Note    Pharmacy to review dose of warfarin  Nazanin Malone is a 45 y.o. female on warfarin therapy for History of DVT/PE. PharmD has been consulted for warfarin dosing.    Current order: Warfarin 2.5 mg  Home dose: Warfarin 2.5 mg daily (adjusted 4/11 when patient's INR noted to be 7.9 after warfarin 5 mg MW (4/7, 4/9))   Coumadin clinic enrollment: Active  INR goal: 2-3    Previous order: Warfarin 5 mg 4/13, Warfarin 2.5 mg 4/14, 4/15,4/16 4/11 pt received vitamin K 10 mg IV    Lab Results   Component Value Date    INR 2.6 (H) 04/17/2025    INR 1.8 (H) 04/16/2025    INR 1.5 (H) 04/15/2025    INR 1.2 04/14/2025    INR 1.1 04/13/2025    INR 2.2 (H) 04/12/2025    INR 8.3 (HH) 04/11/2025    INR 7.9 (HH) 04/10/2025     Significant drug interactions: none  Diet: Adult Regular without supplements     Recommendation(s):   Based on significant INR jump, hold warfarin tonight  Pt bridging w/ Enoxaparin ~1 mg/kg SQ BID  Daily INR  Pharmacy will continue to follow and monitor warfarin      Thank you for the consult,  Brittney Damon  Extension [phone]    **Note: Consults are reviewed Monday-Friday 7:00am-3:30pm. The above recommendations are only suggested. The recommendations should be considered in conjunction with all patient factors.**

## 2025-04-18 PROBLEM — D50.9 MICROCYTIC ANEMIA: Status: ACTIVE | Noted: 2025-04-18

## 2025-04-18 LAB
ABO + RH BLD: NORMAL
ABSOLUTE EOSINOPHIL (OHS): 0.35 K/UL
ABSOLUTE EOSINOPHIL (OHS): 0.45 K/UL
ABSOLUTE MONOCYTE (OHS): 1.72 K/UL (ref 0.3–1)
ABSOLUTE MONOCYTE (OHS): 2.02 K/UL (ref 0.3–1)
ABSOLUTE NEUTROPHIL COUNT (OHS): 5.21 K/UL (ref 1.8–7.7)
ABSOLUTE NEUTROPHIL COUNT (OHS): 7.19 K/UL (ref 1.8–7.7)
ALBUMIN SERPL BCP-MCNC: 2.9 G/DL (ref 3.5–5.2)
ALP SERPL-CCNC: 84 UNIT/L (ref 40–150)
ALT SERPL W/O P-5'-P-CCNC: 11 UNIT/L (ref 10–44)
ANION GAP (OHS): 7 MMOL/L (ref 8–16)
AST SERPL-CCNC: 16 UNIT/L (ref 11–45)
BASOPHILS # BLD AUTO: 0.04 K/UL
BASOPHILS # BLD AUTO: 0.06 K/UL
BASOPHILS NFR BLD AUTO: 0.4 %
BASOPHILS NFR BLD AUTO: 0.5 %
BILIRUB SERPL-MCNC: 0.5 MG/DL (ref 0.1–1)
BILIRUB UR QL STRIP.AUTO: NEGATIVE
BLD PROD TYP BPU: NORMAL
BLOOD UNIT EXPIRATION DATE: NORMAL
BLOOD UNIT TYPE CODE: 5100
BUN SERPL-MCNC: 11 MG/DL (ref 6–20)
CALCIUM SERPL-MCNC: 8.7 MG/DL (ref 8.7–10.5)
CHLORIDE SERPL-SCNC: 105 MMOL/L (ref 95–110)
CLARITY UR: CLEAR
CO2 SERPL-SCNC: 26 MMOL/L (ref 23–29)
COLOR UR AUTO: COLORLESS
CREAT SERPL-MCNC: 1.1 MG/DL (ref 0.5–1.4)
CROSSMATCH INTERPRETATION: NORMAL
DISPENSE STATUS: NORMAL
ERYTHROCYTE [DISTWIDTH] IN BLOOD BY AUTOMATED COUNT: 22.5 % (ref 11.5–14.5)
ERYTHROCYTE [DISTWIDTH] IN BLOOD BY AUTOMATED COUNT: 23.7 % (ref 11.5–14.5)
GFR SERPLBLD CREATININE-BSD FMLA CKD-EPI: >60 ML/MIN/1.73/M2
GLUCOSE SERPL-MCNC: 94 MG/DL (ref 70–110)
GLUCOSE UR QL STRIP: NEGATIVE
HCT VFR BLD AUTO: 21.8 % (ref 37–48.5)
HCT VFR BLD AUTO: 22.7 % (ref 37–48.5)
HGB BLD-MCNC: 6.8 GM/DL (ref 12–16)
HGB BLD-MCNC: 7.4 GM/DL (ref 12–16)
HGB UR QL STRIP: NEGATIVE
HOLD SPECIMEN: NORMAL
IMM GRANULOCYTES # BLD AUTO: 0.06 K/UL (ref 0–0.04)
IMM GRANULOCYTES # BLD AUTO: 0.07 K/UL (ref 0–0.04)
IMM GRANULOCYTES NFR BLD AUTO: 0.5 % (ref 0–0.5)
IMM GRANULOCYTES NFR BLD AUTO: 0.6 % (ref 0–0.5)
INDIRECT COOMBS: ABNORMAL
INR PPP: 2.5 (ref 0.8–1.2)
KETONES UR QL STRIP: NEGATIVE
LEUKOCYTE ESTERASE UR QL STRIP: NEGATIVE
LYMPHOCYTES # BLD AUTO: 3.11 K/UL (ref 1–4.8)
LYMPHOCYTES # BLD AUTO: 3.47 K/UL (ref 1–4.8)
MAGNESIUM SERPL-MCNC: 1.7 MG/DL (ref 1.6–2.6)
MCH RBC QN AUTO: 21.7 PG (ref 27–31)
MCH RBC QN AUTO: 23.1 PG (ref 27–31)
MCHC RBC AUTO-ENTMCNC: 31.2 G/DL (ref 32–36)
MCHC RBC AUTO-ENTMCNC: 32.6 G/DL (ref 32–36)
MCV RBC AUTO: 69 FL (ref 82–98)
MCV RBC AUTO: 71 FL (ref 82–98)
NITRITE UR QL STRIP: NEGATIVE
NUCLEATED RBC (/100WBC) (OHS): 5 /100 WBC
NUCLEATED RBC (/100WBC) (OHS): 6 /100 WBC
PH UR STRIP: 6 [PH]
PHOSPHATE SERPL-MCNC: 2.7 MG/DL (ref 2.7–4.5)
PLATELET # BLD AUTO: 324 K/UL (ref 150–450)
PLATELET # BLD AUTO: 359 K/UL (ref 150–450)
PMV BLD AUTO: 9.2 FL (ref 9.2–12.9)
PMV BLD AUTO: 9.4 FL (ref 9.2–12.9)
POTASSIUM SERPL-SCNC: 4 MMOL/L (ref 3.5–5.1)
PROT SERPL-MCNC: 6.4 GM/DL (ref 6–8.4)
PROT UR QL STRIP: NEGATIVE
PROTHROMBIN TIME: 25.3 SECONDS (ref 9–12.5)
RBC # BLD AUTO: 3.14 M/UL (ref 4–5.4)
RBC # BLD AUTO: 3.2 M/UL (ref 4–5.4)
RELATIVE EOSINOPHIL (OHS): 3.2 %
RELATIVE EOSINOPHIL (OHS): 3.5 %
RELATIVE LYMPHOCYTE (OHS): 24.1 % (ref 18–48)
RELATIVE LYMPHOCYTE (OHS): 32 % (ref 18–48)
RELATIVE MONOCYTE (OHS): 15.7 % (ref 4–15)
RELATIVE MONOCYTE (OHS): 15.8 % (ref 4–15)
RELATIVE NEUTROPHIL (OHS): 48 % (ref 38–73)
RELATIVE NEUTROPHIL (OHS): 55.7 % (ref 38–73)
RH BLD: ABNORMAL
SODIUM SERPL-SCNC: 138 MMOL/L (ref 136–145)
SP GR UR STRIP: 1.01
SPECIMEN OUTDATE: ABNORMAL
UNIT NUMBER: NORMAL
UROBILINOGEN UR STRIP-ACNC: NEGATIVE EU/DL
WBC # BLD AUTO: 10.86 K/UL (ref 3.9–12.7)
WBC # BLD AUTO: 12.89 K/UL (ref 3.9–12.7)

## 2025-04-18 PROCEDURE — 84100 ASSAY OF PHOSPHORUS: CPT

## 2025-04-18 PROCEDURE — 86850 RBC ANTIBODY SCREEN: CPT | Performed by: INTERNAL MEDICINE

## 2025-04-18 PROCEDURE — 82040 ASSAY OF SERUM ALBUMIN: CPT

## 2025-04-18 PROCEDURE — 85610 PROTHROMBIN TIME: CPT

## 2025-04-18 PROCEDURE — 25000003 PHARM REV CODE 250: Performed by: INTERNAL MEDICINE

## 2025-04-18 PROCEDURE — 11000001 HC ACUTE MED/SURG PRIVATE ROOM

## 2025-04-18 PROCEDURE — 85025 COMPLETE CBC W/AUTO DIFF WBC: CPT

## 2025-04-18 PROCEDURE — 36415 COLL VENOUS BLD VENIPUNCTURE: CPT | Performed by: INTERNAL MEDICINE

## 2025-04-18 PROCEDURE — 25000003 PHARM REV CODE 250

## 2025-04-18 PROCEDURE — 63600175 PHARM REV CODE 636 W HCPCS: Performed by: INTERNAL MEDICINE

## 2025-04-18 PROCEDURE — 85660 RBC SICKLE CELL TEST: CPT | Performed by: INTERNAL MEDICINE

## 2025-04-18 PROCEDURE — 87040 BLOOD CULTURE FOR BACTERIA: CPT | Performed by: INTERNAL MEDICINE

## 2025-04-18 PROCEDURE — 81003 URINALYSIS AUTO W/O SCOPE: CPT | Performed by: INTERNAL MEDICINE

## 2025-04-18 PROCEDURE — P9016 RBC LEUKOCYTES REDUCED: HCPCS | Performed by: INTERNAL MEDICINE

## 2025-04-18 PROCEDURE — 86922 COMPATIBILITY TEST ANTIGLOB: CPT | Performed by: INTERNAL MEDICINE

## 2025-04-18 PROCEDURE — 36430 TRANSFUSION BLD/BLD COMPNT: CPT

## 2025-04-18 PROCEDURE — 30233N1 TRANSFUSION OF NONAUTOLOGOUS RED BLOOD CELLS INTO PERIPHERAL VEIN, PERCUTANEOUS APPROACH: ICD-10-PCS | Performed by: INTERNAL MEDICINE

## 2025-04-18 PROCEDURE — 36415 COLL VENOUS BLD VENIPUNCTURE: CPT

## 2025-04-18 PROCEDURE — 86902 BLOOD TYPE ANTIGEN DONOR EA: CPT | Performed by: INTERNAL MEDICINE

## 2025-04-18 PROCEDURE — 25000003 PHARM REV CODE 250: Performed by: PHYSICIAN ASSISTANT

## 2025-04-18 PROCEDURE — 85025 COMPLETE CBC W/AUTO DIFF WBC: CPT | Performed by: INTERNAL MEDICINE

## 2025-04-18 PROCEDURE — 63600175 PHARM REV CODE 636 W HCPCS: Performed by: PHYSICIAN ASSISTANT

## 2025-04-18 PROCEDURE — 83735 ASSAY OF MAGNESIUM: CPT

## 2025-04-18 RX ORDER — HYDROMORPHONE HYDROCHLORIDE 2 MG/ML
2.5 INJECTION, SOLUTION INTRAMUSCULAR; INTRAVENOUS; SUBCUTANEOUS
Status: DISCONTINUED | OUTPATIENT
Start: 2025-04-18 | End: 2025-04-19

## 2025-04-18 RX ORDER — HYDROCODONE BITARTRATE AND ACETAMINOPHEN 500; 5 MG/1; MG/1
TABLET ORAL
Status: DISCONTINUED | OUTPATIENT
Start: 2025-04-18 | End: 2025-04-19

## 2025-04-18 RX ADMIN — SUCRALFATE 1 G: 1 TABLET ORAL at 04:04

## 2025-04-18 RX ADMIN — HYDROCODONE BITARTRATE AND ACETAMINOPHEN 1 TABLET: 10; 325 TABLET ORAL at 06:04

## 2025-04-18 RX ADMIN — HYDROMORPHONE HYDROCHLORIDE 2.5 MG: 2 INJECTION INTRAMUSCULAR; INTRAVENOUS; SUBCUTANEOUS at 11:04

## 2025-04-18 RX ADMIN — HYDROCODONE BITARTRATE AND ACETAMINOPHEN 1 TABLET: 10; 325 TABLET ORAL at 09:04

## 2025-04-18 RX ADMIN — DIPHENHYDRAMINE HYDROCHLORIDE 25 MG: 50 INJECTION, SOLUTION INTRAMUSCULAR; INTRAVENOUS at 03:04

## 2025-04-18 RX ADMIN — DIPHENHYDRAMINE HYDROCHLORIDE 25 MG: 50 INJECTION, SOLUTION INTRAMUSCULAR; INTRAVENOUS at 01:04

## 2025-04-18 RX ADMIN — DIPHENHYDRAMINE HYDROCHLORIDE 25 MG: 50 INJECTION, SOLUTION INTRAMUSCULAR; INTRAVENOUS at 11:04

## 2025-04-18 RX ADMIN — METHOCARBAMOL 500 MG: 500 TABLET ORAL at 08:04

## 2025-04-18 RX ADMIN — PRAZOSIN HYDROCHLORIDE 1 MG: 1 CAPSULE ORAL at 08:04

## 2025-04-18 RX ADMIN — FOLIC ACID 1 MG: 1 TABLET ORAL at 08:04

## 2025-04-18 RX ADMIN — HYDROMORPHONE HYDROCHLORIDE 2 MG: 2 INJECTION INTRAMUSCULAR; INTRAVENOUS; SUBCUTANEOUS at 03:04

## 2025-04-18 RX ADMIN — HYDROCODONE BITARTRATE AND ACETAMINOPHEN 1 TABLET: 10; 325 TABLET ORAL at 02:04

## 2025-04-18 RX ADMIN — DIPHENHYDRAMINE HYDROCHLORIDE 25 MG: 50 INJECTION, SOLUTION INTRAMUSCULAR; INTRAVENOUS at 06:04

## 2025-04-18 RX ADMIN — METOCLOPRAMIDE 5 MG: 5 TABLET ORAL at 09:04

## 2025-04-18 RX ADMIN — SUCRALFATE 1 G: 1 TABLET ORAL at 06:04

## 2025-04-18 RX ADMIN — GABAPENTIN 600 MG: 300 CAPSULE ORAL at 08:04

## 2025-04-18 RX ADMIN — AMLODIPINE BESYLATE 10 MG: 10 TABLET ORAL at 08:04

## 2025-04-18 RX ADMIN — WARFARIN SODIUM 2.5 MG: 2.5 TABLET ORAL at 04:04

## 2025-04-18 RX ADMIN — HYDROCODONE BITARTRATE AND ACETAMINOPHEN 1 TABLET: 10; 325 TABLET ORAL at 01:04

## 2025-04-18 RX ADMIN — HYDROMORPHONE HYDROCHLORIDE 2.5 MG: 2 INJECTION INTRAMUSCULAR; INTRAVENOUS; SUBCUTANEOUS at 03:04

## 2025-04-18 RX ADMIN — LACTULOSE 20 G: 20 SOLUTION ORAL at 08:04

## 2025-04-18 RX ADMIN — HYDROCODONE BITARTRATE AND ACETAMINOPHEN 1 TABLET: 10; 325 TABLET ORAL at 08:04

## 2025-04-18 RX ADMIN — SUCRALFATE 1 G: 1 TABLET ORAL at 09:04

## 2025-04-18 RX ADMIN — FLUOXETINE HYDROCHLORIDE 80 MG: 20 CAPSULE ORAL at 08:04

## 2025-04-18 RX ADMIN — HYDROMORPHONE HYDROCHLORIDE 2 MG: 2 INJECTION INTRAMUSCULAR; INTRAVENOUS; SUBCUTANEOUS at 01:04

## 2025-04-18 RX ADMIN — PANTOPRAZOLE SODIUM 40 MG: 40 TABLET, DELAYED RELEASE ORAL at 09:04

## 2025-04-18 RX ADMIN — MORPHINE SULFATE 30 MG: 30 TABLET, EXTENDED RELEASE ORAL at 08:04

## 2025-04-18 RX ADMIN — METOCLOPRAMIDE 5 MG: 5 TABLET ORAL at 04:04

## 2025-04-18 RX ADMIN — SUCRALFATE 1 G: 1 TABLET ORAL at 08:04

## 2025-04-18 RX ADMIN — GABAPENTIN 600 MG: 300 CAPSULE ORAL at 02:04

## 2025-04-18 RX ADMIN — METOCLOPRAMIDE 5 MG: 5 TABLET ORAL at 01:04

## 2025-04-18 RX ADMIN — MORPHINE SULFATE 30 MG: 30 TABLET, EXTENDED RELEASE ORAL at 02:04

## 2025-04-18 RX ADMIN — METOCLOPRAMIDE 5 MG: 5 TABLET ORAL at 08:04

## 2025-04-18 RX ADMIN — PANTOPRAZOLE SODIUM 40 MG: 40 TABLET, DELAYED RELEASE ORAL at 08:04

## 2025-04-18 RX ADMIN — HYDROMORPHONE HYDROCHLORIDE 2.5 MG: 2 INJECTION INTRAMUSCULAR; INTRAVENOUS; SUBCUTANEOUS at 08:04

## 2025-04-18 RX ADMIN — METHOCARBAMOL 500 MG: 500 TABLET ORAL at 04:04

## 2025-04-18 RX ADMIN — DIPHENHYDRAMINE HYDROCHLORIDE 25 MG: 50 INJECTION, SOLUTION INTRAMUSCULAR; INTRAVENOUS at 09:04

## 2025-04-18 RX ADMIN — METHOCARBAMOL 500 MG: 500 TABLET ORAL at 01:04

## 2025-04-18 RX ADMIN — HYDROMORPHONE HYDROCHLORIDE 2.5 MG: 2 INJECTION INTRAMUSCULAR; INTRAVENOUS; SUBCUTANEOUS at 06:04

## 2025-04-18 RX ADMIN — HYDROCODONE BITARTRATE AND ACETAMINOPHEN 1 TABLET: 10; 325 TABLET ORAL at 05:04

## 2025-04-18 RX ADMIN — HYDROMORPHONE HYDROCHLORIDE 2 MG: 2 INJECTION INTRAMUSCULAR; INTRAVENOUS; SUBCUTANEOUS at 07:04

## 2025-04-18 NOTE — ASSESSMENT & PLAN NOTE
Anemia is likely due to acute blood loss from hematoma, chronic blood loss from sickle cell. Most recent hemoglobin and hematocrit are listed below.  Recent Labs     04/17/25  1120 04/17/25  1943 04/18/25  0717   HGB 7.3* 7.1* 6.8*   HCT 22.9* 22.3* 21.8*     Plan  - Monitor serial CBC: Daily  - Transfuse PRBC if patient becomes hemodynamically unstable, symptomatic or H/H drops below 7/21.  - Patient has received 1u units of PRBCs on 4/18  - Patient's anemia is currently stable

## 2025-04-18 NOTE — ASSESSMENT & PLAN NOTE
Patient's most recent potassium results are listed below.   Recent Labs     04/16/25  0519 04/17/25  0539 04/18/25  0717   K 4.3 4.2 4.0     EKG showed normal sinus rhythm.    Plan  - Replete potassium per protocol  - Monitor potassium Daily  - Patient's hypokalemia is stable

## 2025-04-18 NOTE — PLAN OF CARE
Problem: Adult Inpatient Plan of Care  Goal: Plan of Care Review  Outcome: Progressing  Goal: Patient-Specific Goal (Individualized)  Outcome: Progressing  Goal: Absence of Hospital-Acquired Illness or Injury  Outcome: Progressing  Goal: Optimal Comfort and Wellbeing  Outcome: Progressing  Goal: Readiness for Transition of Care  Outcome: Progressing     ptA&Ox4, on RA. Prn pain meds given. VSS

## 2025-04-18 NOTE — ASSESSMENT & PLAN NOTE
Patient's hemorrhage is due to trauma/laceration, this bleeding is associated with an anticoagulant, the anticoagulant is Select Anticoagulant(s): Warfarin/Coumadin. Patients most recent Hgb, Hct, platelets, and INR are listed below.  Recent Labs     04/16/25  0519 04/16/25  1517 04/17/25  0539 04/17/25  1120 04/17/25  1943 04/18/25  0717   HGB 7.9*   < > 8.1* 7.3* 7.1* 6.8*   HCT 25.1*   < > 25.4* 22.9* 22.3* 21.8*      < > 409 [phone]   INR 1.8*  --  2.6*  --   --  2.5*    < > = values in this interval not displayed.     Plan  - Will trend hemoglobin/hematocrit Daily  - Will monitor and correct any coagulation defects  - Will transfuse if Hgb is <7g/dl (<8g/dl in cases of active ACS) or if patient has rapid bleeding leading to hemodynamic instability  - R breast US with multiple hematomas throughout breast  - Compressive bra for chest wall support   - Warm compresses  - Transfuse 1u pRBC today
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
## 2025-04-18 NOTE — SUBJECTIVE & OBJECTIVE
Interval History: NAEON. US with multiple hematomas R breast. Pt reports worsening breast pain today. Unable to wear compression bra as it doesn't fit. Surgery provided her with larger bra. Hb 6.8 and INR 2.5 today.     Review of Systems   Constitutional:  Negative for chills and fever.   Respiratory:  Negative for cough and shortness of breath.    Cardiovascular:  Positive for chest pain (breast pain). Negative for palpitations and leg swelling.   Gastrointestinal:  Negative for abdominal distention, abdominal pain, diarrhea, nausea and vomiting.   Genitourinary:  Negative for flank pain.   Musculoskeletal:  Positive for arthralgias and myalgias.   Skin:  Negative for wound.   Neurological:  Negative for dizziness and headaches.   Psychiatric/Behavioral:  Negative for confusion, decreased concentration and dysphoric mood.      Objective:     Vital Signs (Most Recent):  Temp: 98.2 °F (36.8 °C) (04/18/25 1217)  Pulse: 103 (04/18/25 1217)  Resp: 18 (04/18/25 1509)  BP: 113/63 (04/18/25 1217)  SpO2: 96 % (04/18/25 1217) Vital Signs (24h Range):  Temp:  [98.2 °F (36.8 °C)-99.9 °F (37.7 °C)] 98.2 °F (36.8 °C)  Pulse:  [102-105] 103  Resp:  [16-18] 18  SpO2:  [95 %-97 %] 96 %  BP: (113-134)/(63-86) 113/63     Weight: 93.9 kg (207 lb 0.2 oz)  Body mass index is 37.86 kg/m².    Intake/Output Summary (Last 24 hours) at 4/18/2025 1537  Last data filed at 4/18/2025 1040  Gross per 24 hour   Intake 240 ml   Output --   Net 240 ml           Physical Exam  Vitals reviewed.   Constitutional:       General: She is not in acute distress.  HENT:      Head: Normocephalic and atraumatic.      Nose: Nose normal.   Eyes:      General:         Right eye: No discharge.         Left eye: No discharge.   Cardiovascular:      Rate and Rhythm: Normal rate and regular rhythm.   Pulmonary:      Effort: Pulmonary effort is normal. No respiratory distress.      Breath sounds: No stridor.   Chest:       Abdominal:      General: Abdomen is flat.       Palpations: Abdomen is soft.   Musculoskeletal:         General: Normal range of motion.      Cervical back: Normal range of motion.      Comments: Left shoulder abrasion  Left forearm hematoma and swelling   Various abrasions on knees    Skin:     General: Skin is warm and dry.      Comments: Right breast hematoma with tenderness, worsened   Neurological:      General: No focal deficit present.      Mental Status: She is alert and oriented to person, place, and time.      Comments: AOx3  Moving all extremities  CN grossly intact  No midline spine tenderness      Psychiatric:         Mood and Affect: Mood normal.         Behavior: Behavior normal.               Significant Labs: All pertinent labs within the past 24 hours have been reviewed.    Significant Imaging: I have reviewed all pertinent imaging results/findings within the past 24 hours.
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
## 2025-04-18 NOTE — ASSESSMENT & PLAN NOTE
-continue home hydrocodone-acetomenophen  q4h, morphine 30 mg tid  -IV benadryl  -IV dilaudid q3h PRN for breakthrough pain, increase to 2.5mg  -IV fluid hydration

## 2025-04-18 NOTE — ASSESSMENT & PLAN NOTE
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 Had been on apixiban and then Lovenox due to insurance coverage issues.  Now on coumadin with supratherapeutic INR.    - resumed warfarin w/ lovenox bridge per pharmacy  - s/p vit K  - INR now therapeutic  - pharm following for coumadin management.    See above  See above  See above

## 2025-04-19 LAB
ABSOLUTE EOSINOPHIL (OHS): 0.46 K/UL
ABSOLUTE MONOCYTE (OHS): 1.99 K/UL (ref 0.3–1)
ABSOLUTE NEUTROPHIL COUNT (OHS): 6.3 K/UL (ref 1.8–7.7)
ALBUMIN SERPL BCP-MCNC: 2.9 G/DL (ref 3.5–5.2)
ALP SERPL-CCNC: 81 UNIT/L (ref 40–150)
ALT SERPL W/O P-5'-P-CCNC: 10 UNIT/L (ref 10–44)
ANION GAP (OHS): 6 MMOL/L (ref 8–16)
AST SERPL-CCNC: 15 UNIT/L (ref 11–45)
BASOPHILS # BLD AUTO: 0.05 K/UL
BASOPHILS NFR BLD AUTO: 0.4 %
BILIRUB SERPL-MCNC: 0.4 MG/DL (ref 0.1–1)
BUN SERPL-MCNC: 12 MG/DL (ref 6–20)
CALCIUM SERPL-MCNC: 8.6 MG/DL (ref 8.7–10.5)
CHLORIDE SERPL-SCNC: 105 MMOL/L (ref 95–110)
CO2 SERPL-SCNC: 28 MMOL/L (ref 23–29)
CREAT SERPL-MCNC: 1 MG/DL (ref 0.5–1.4)
ERYTHROCYTE [DISTWIDTH] IN BLOOD BY AUTOMATED COUNT: 23.6 % (ref 11.5–14.5)
GFR SERPLBLD CREATININE-BSD FMLA CKD-EPI: >60 ML/MIN/1.73/M2
GLUCOSE SERPL-MCNC: 95 MG/DL (ref 70–110)
HCT VFR BLD AUTO: 22 % (ref 37–48.5)
HGB BLD-MCNC: 7.1 GM/DL (ref 12–16)
IMM GRANULOCYTES # BLD AUTO: 0.07 K/UL (ref 0–0.04)
IMM GRANULOCYTES NFR BLD AUTO: 0.6 % (ref 0–0.5)
INR PPP: 2.3 (ref 0.8–1.2)
LYMPHOCYTES # BLD AUTO: 2.72 K/UL (ref 1–4.8)
MAGNESIUM SERPL-MCNC: 1.7 MG/DL (ref 1.6–2.6)
MCH RBC QN AUTO: 22.9 PG (ref 27–31)
MCHC RBC AUTO-ENTMCNC: 32.3 G/DL (ref 32–36)
MCV RBC AUTO: 71 FL (ref 82–98)
NUCLEATED RBC (/100WBC) (OHS): 5 /100 WBC
PHOSPHATE SERPL-MCNC: 2.7 MG/DL (ref 2.7–4.5)
PLATELET # BLD AUTO: 308 K/UL (ref 150–450)
PMV BLD AUTO: 9.7 FL (ref 9.2–12.9)
POTASSIUM SERPL-SCNC: 3.8 MMOL/L (ref 3.5–5.1)
PROT SERPL-MCNC: 6.6 GM/DL (ref 6–8.4)
PROTHROMBIN TIME: 23.3 SECONDS (ref 9–12.5)
RBC # BLD AUTO: 3.1 M/UL (ref 4–5.4)
RELATIVE EOSINOPHIL (OHS): 4 %
RELATIVE LYMPHOCYTE (OHS): 23.5 % (ref 18–48)
RELATIVE MONOCYTE (OHS): 17.2 % (ref 4–15)
RELATIVE NEUTROPHIL (OHS): 54.3 % (ref 38–73)
SODIUM SERPL-SCNC: 139 MMOL/L (ref 136–145)
WBC # BLD AUTO: 11.59 K/UL (ref 3.9–12.7)

## 2025-04-19 PROCEDURE — 25000003 PHARM REV CODE 250

## 2025-04-19 PROCEDURE — 25000003 PHARM REV CODE 250: Performed by: INTERNAL MEDICINE

## 2025-04-19 PROCEDURE — 82040 ASSAY OF SERUM ALBUMIN: CPT

## 2025-04-19 PROCEDURE — 25000003 PHARM REV CODE 250: Performed by: PHYSICIAN ASSISTANT

## 2025-04-19 PROCEDURE — 63600175 PHARM REV CODE 636 W HCPCS: Performed by: PHYSICIAN ASSISTANT

## 2025-04-19 PROCEDURE — 85610 PROTHROMBIN TIME: CPT

## 2025-04-19 PROCEDURE — 36415 COLL VENOUS BLD VENIPUNCTURE: CPT

## 2025-04-19 PROCEDURE — 11000001 HC ACUTE MED/SURG PRIVATE ROOM

## 2025-04-19 PROCEDURE — 63600175 PHARM REV CODE 636 W HCPCS: Performed by: INTERNAL MEDICINE

## 2025-04-19 PROCEDURE — 25000003 PHARM REV CODE 250: Performed by: STUDENT IN AN ORGANIZED HEALTH CARE EDUCATION/TRAINING PROGRAM

## 2025-04-19 PROCEDURE — 84100 ASSAY OF PHOSPHORUS: CPT

## 2025-04-19 PROCEDURE — 85025 COMPLETE CBC W/AUTO DIFF WBC: CPT | Performed by: INTERNAL MEDICINE

## 2025-04-19 PROCEDURE — 83735 ASSAY OF MAGNESIUM: CPT

## 2025-04-19 RX ORDER — ACETAMINOPHEN 325 MG/1
650 TABLET ORAL ONCE
Status: COMPLETED | OUTPATIENT
Start: 2025-04-19 | End: 2025-04-19

## 2025-04-19 RX ORDER — HYDROMORPHONE HYDROCHLORIDE 2 MG/ML
3 INJECTION, SOLUTION INTRAMUSCULAR; INTRAVENOUS; SUBCUTANEOUS
Status: DISCONTINUED | OUTPATIENT
Start: 2025-04-19 | End: 2025-04-28 | Stop reason: HOSPADM

## 2025-04-19 RX ADMIN — HYDROCODONE BITARTRATE AND ACETAMINOPHEN 1 TABLET: 10; 325 TABLET ORAL at 09:04

## 2025-04-19 RX ADMIN — METHOCARBAMOL 500 MG: 500 TABLET ORAL at 08:04

## 2025-04-19 RX ADMIN — METHOCARBAMOL 500 MG: 500 TABLET ORAL at 01:04

## 2025-04-19 RX ADMIN — HYDROMORPHONE HYDROCHLORIDE 2.5 MG: 2 INJECTION INTRAMUSCULAR; INTRAVENOUS; SUBCUTANEOUS at 03:04

## 2025-04-19 RX ADMIN — PANTOPRAZOLE SODIUM 40 MG: 40 TABLET, DELAYED RELEASE ORAL at 09:04

## 2025-04-19 RX ADMIN — SUCRALFATE 1 G: 1 TABLET ORAL at 05:04

## 2025-04-19 RX ADMIN — PRAZOSIN HYDROCHLORIDE 1 MG: 1 CAPSULE ORAL at 08:04

## 2025-04-19 RX ADMIN — METOCLOPRAMIDE 5 MG: 5 TABLET ORAL at 08:04

## 2025-04-19 RX ADMIN — DIPHENHYDRAMINE HYDROCHLORIDE 25 MG: 50 INJECTION, SOLUTION INTRAMUSCULAR; INTRAVENOUS at 06:04

## 2025-04-19 RX ADMIN — METHOCARBAMOL 500 MG: 500 TABLET ORAL at 04:04

## 2025-04-19 RX ADMIN — GABAPENTIN 600 MG: 300 CAPSULE ORAL at 08:04

## 2025-04-19 RX ADMIN — FOLIC ACID 1 MG: 1 TABLET ORAL at 09:04

## 2025-04-19 RX ADMIN — HYDROMORPHONE HYDROCHLORIDE 3 MG: 2 INJECTION INTRAMUSCULAR; INTRAVENOUS; SUBCUTANEOUS at 04:04

## 2025-04-19 RX ADMIN — DIPHENHYDRAMINE HYDROCHLORIDE 25 MG: 50 INJECTION, SOLUTION INTRAMUSCULAR; INTRAVENOUS at 03:04

## 2025-04-19 RX ADMIN — METHOCARBAMOL 500 MG: 500 TABLET ORAL at 09:04

## 2025-04-19 RX ADMIN — HYDROMORPHONE HYDROCHLORIDE 3 MG: 2 INJECTION INTRAMUSCULAR; INTRAVENOUS; SUBCUTANEOUS at 10:04

## 2025-04-19 RX ADMIN — METOCLOPRAMIDE 5 MG: 5 TABLET ORAL at 09:04

## 2025-04-19 RX ADMIN — ACETAMINOPHEN 650 MG: 325 TABLET ORAL at 08:04

## 2025-04-19 RX ADMIN — HYDROMORPHONE HYDROCHLORIDE 3 MG: 2 INJECTION INTRAMUSCULAR; INTRAVENOUS; SUBCUTANEOUS at 01:04

## 2025-04-19 RX ADMIN — HYDROCODONE BITARTRATE AND ACETAMINOPHEN 1 TABLET: 10; 325 TABLET ORAL at 05:04

## 2025-04-19 RX ADMIN — GABAPENTIN 600 MG: 300 CAPSULE ORAL at 09:04

## 2025-04-19 RX ADMIN — DIPHENHYDRAMINE HYDROCHLORIDE 25 MG: 50 INJECTION, SOLUTION INTRAMUSCULAR; INTRAVENOUS at 04:04

## 2025-04-19 RX ADMIN — METOCLOPRAMIDE 5 MG: 5 TABLET ORAL at 04:04

## 2025-04-19 RX ADMIN — HYDROMORPHONE HYDROCHLORIDE 2.5 MG: 2 INJECTION INTRAMUSCULAR; INTRAVENOUS; SUBCUTANEOUS at 12:04

## 2025-04-19 RX ADMIN — HYDROCODONE BITARTRATE AND ACETAMINOPHEN 1 TABLET: 10; 325 TABLET ORAL at 02:04

## 2025-04-19 RX ADMIN — HYDROMORPHONE HYDROCHLORIDE 2.5 MG: 2 INJECTION INTRAMUSCULAR; INTRAVENOUS; SUBCUTANEOUS at 06:04

## 2025-04-19 RX ADMIN — SUCRALFATE 1 G: 1 TABLET ORAL at 08:04

## 2025-04-19 RX ADMIN — GABAPENTIN 600 MG: 300 CAPSULE ORAL at 03:04

## 2025-04-19 RX ADMIN — WARFARIN SODIUM 2.5 MG: 2.5 TABLET ORAL at 04:04

## 2025-04-19 RX ADMIN — MORPHINE SULFATE 30 MG: 30 TABLET, EXTENDED RELEASE ORAL at 08:04

## 2025-04-19 RX ADMIN — PANTOPRAZOLE SODIUM 40 MG: 40 TABLET, DELAYED RELEASE ORAL at 08:04

## 2025-04-19 RX ADMIN — SUCRALFATE 1 G: 1 TABLET ORAL at 04:04

## 2025-04-19 RX ADMIN — LACTULOSE 20 G: 20 SOLUTION ORAL at 09:04

## 2025-04-19 RX ADMIN — MORPHINE SULFATE 30 MG: 30 TABLET, EXTENDED RELEASE ORAL at 09:04

## 2025-04-19 RX ADMIN — FLUOXETINE HYDROCHLORIDE 80 MG: 20 CAPSULE ORAL at 09:04

## 2025-04-19 RX ADMIN — MORPHINE SULFATE 30 MG: 30 TABLET, EXTENDED RELEASE ORAL at 03:04

## 2025-04-19 RX ADMIN — HYDROCODONE BITARTRATE AND ACETAMINOPHEN 1 TABLET: 10; 325 TABLET ORAL at 11:04

## 2025-04-19 RX ADMIN — DIPHENHYDRAMINE HYDROCHLORIDE 25 MG: 50 INJECTION, SOLUTION INTRAMUSCULAR; INTRAVENOUS at 12:04

## 2025-04-19 RX ADMIN — AMLODIPINE BESYLATE 10 MG: 10 TABLET ORAL at 09:04

## 2025-04-19 RX ADMIN — DIPHENHYDRAMINE HYDROCHLORIDE 25 MG: 50 INJECTION, SOLUTION INTRAMUSCULAR; INTRAVENOUS at 10:04

## 2025-04-19 RX ADMIN — DIPHENHYDRAMINE HYDROCHLORIDE 25 MG: 50 INJECTION, SOLUTION INTRAMUSCULAR; INTRAVENOUS at 01:04

## 2025-04-19 RX ADMIN — SUCRALFATE 1 G: 1 TABLET ORAL at 11:04

## 2025-04-19 RX ADMIN — METOCLOPRAMIDE 5 MG: 5 TABLET ORAL at 01:04

## 2025-04-19 RX ADMIN — HYDROMORPHONE HYDROCHLORIDE 3 MG: 2 INJECTION INTRAMUSCULAR; INTRAVENOUS; SUBCUTANEOUS at 06:04

## 2025-04-19 NOTE — ASSESSMENT & PLAN NOTE
Patient's most recent potassium results are listed below.   Recent Labs     04/17/25  0539 04/18/25  0717 04/19/25  0531   K 4.2 4.0 3.8     EKG showed normal sinus rhythm.    Plan  - Replete potassium per protocol  - Monitor potassium Daily  - Patient's hypokalemia is stable
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
## 2025-04-19 NOTE — ASSESSMENT & PLAN NOTE
Patient's hemorrhage is due to trauma/laceration, this bleeding is associated with an anticoagulant, the anticoagulant is Select Anticoagulant(s): Warfarin/Coumadin. Patients most recent Hgb, Hct, platelets, and INR are listed below.  Recent Labs     04/17/25  0539 04/17/25  1120 04/18/25  0717 04/18/25  1534 04/19/25  0531   HGB 8.1*   < > 6.8* 7.4* 7.1*   HCT 25.4*   < > 21.8* 22.7* 22.0*      < > 359 324 308   INR 2.6*  --  2.5*  --  2.3*    < > = values in this interval not displayed.     Plan  - Will trend hemoglobin/hematocrit Daily  - Will monitor and correct any coagulation defects  - Will transfuse if Hgb is <7g/dl (<8g/dl in cases of active ACS) or if patient has rapid bleeding leading to hemodynamic instability  - R breast US with multiple hematomas throughout breast  - Compressive bra for chest wall support   - Warm compresses  - Transfuse 1u pRBC today

## 2025-04-19 NOTE — ASSESSMENT & PLAN NOTE
Anemia is likely due to acute blood loss from hematoma, chronic blood loss from sickle cell. Most recent hemoglobin and hematocrit are listed below.  Recent Labs     04/18/25  0717 04/18/25  1534 04/19/25  0531   HGB 6.8* 7.4* 7.1*   HCT 21.8* 22.7* 22.0*     Plan  - Monitor serial CBC: Daily  - Transfuse PRBC if patient becomes hemodynamically unstable, symptomatic or H/H drops below 7/21.  - Patient has received 1u units of PRBCs on 4/18  - Patient's anemia is currently stable

## 2025-04-19 NOTE — ASSESSMENT & PLAN NOTE
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 Had been on apixiban and then Lovenox due to insurance coverage issues.  Now on coumadin with supratherapeutic INR.    - s/p vit K  - INR now therapeutic  - pharm following for coumadin management.
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
## 2025-04-19 NOTE — ASSESSMENT & PLAN NOTE
-continue home hydrocodone-acetomenophen  q4h, morphine 30 mg tid  -IV benadryl; Plan to change to PO benadryl  -IV dilaudid q3h PRN for breakthrough pain; plan to wean  -IV fluid hydration

## 2025-04-20 LAB
ABSOLUTE EOSINOPHIL (OHS): 0.37 K/UL
ABSOLUTE MONOCYTE (OHS): 1.77 K/UL (ref 0.3–1)
ABSOLUTE NEUTROPHIL COUNT (OHS): 5.13 K/UL (ref 1.8–7.7)
ALBUMIN SERPL BCP-MCNC: 3 G/DL (ref 3.5–5.2)
ALP SERPL-CCNC: 78 UNIT/L (ref 40–150)
ALT SERPL W/O P-5'-P-CCNC: 11 UNIT/L (ref 10–44)
ANION GAP (OHS): 5 MMOL/L (ref 8–16)
AST SERPL-CCNC: 16 UNIT/L (ref 11–45)
BASOPHILS # BLD AUTO: 0.06 K/UL
BASOPHILS NFR BLD AUTO: 0.6 %
BILIRUB SERPL-MCNC: 0.4 MG/DL (ref 0.1–1)
BUN SERPL-MCNC: 11 MG/DL (ref 6–20)
CALCIUM SERPL-MCNC: 8.5 MG/DL (ref 8.7–10.5)
CHLORIDE SERPL-SCNC: 103 MMOL/L (ref 95–110)
CO2 SERPL-SCNC: 30 MMOL/L (ref 23–29)
CREAT SERPL-MCNC: 1.1 MG/DL (ref 0.5–1.4)
ERYTHROCYTE [DISTWIDTH] IN BLOOD BY AUTOMATED COUNT: 23.5 % (ref 11.5–14.5)
GFR SERPLBLD CREATININE-BSD FMLA CKD-EPI: >60 ML/MIN/1.73/M2
GLUCOSE SERPL-MCNC: 85 MG/DL (ref 70–110)
HCT VFR BLD AUTO: 22.4 % (ref 37–48.5)
HGB BLD-MCNC: 7.1 GM/DL (ref 12–16)
IMM GRANULOCYTES # BLD AUTO: 0.04 K/UL (ref 0–0.04)
IMM GRANULOCYTES NFR BLD AUTO: 0.4 % (ref 0–0.5)
INR PPP: 2.1 (ref 0.8–1.2)
LYMPHOCYTES # BLD AUTO: 3.4 K/UL (ref 1–4.8)
MAGNESIUM SERPL-MCNC: 1.7 MG/DL (ref 1.6–2.6)
MCH RBC QN AUTO: 22.4 PG (ref 27–31)
MCHC RBC AUTO-ENTMCNC: 31.7 G/DL (ref 32–36)
MCV RBC AUTO: 71 FL (ref 82–98)
NUCLEATED RBC (/100WBC) (OHS): 6 /100 WBC
PHOSPHATE SERPL-MCNC: 2.7 MG/DL (ref 2.7–4.5)
PLATELET # BLD AUTO: 291 K/UL (ref 150–450)
PMV BLD AUTO: 9.7 FL (ref 9.2–12.9)
POTASSIUM SERPL-SCNC: 3.9 MMOL/L (ref 3.5–5.1)
PROT SERPL-MCNC: 6.8 GM/DL (ref 6–8.4)
PROTHROMBIN TIME: 21.4 SECONDS (ref 9–12.5)
RBC # BLD AUTO: 3.17 M/UL (ref 4–5.4)
RELATIVE EOSINOPHIL (OHS): 3.4 %
RELATIVE LYMPHOCYTE (OHS): 31.6 % (ref 18–48)
RELATIVE MONOCYTE (OHS): 16.4 % (ref 4–15)
RELATIVE NEUTROPHIL (OHS): 47.6 % (ref 38–73)
SODIUM SERPL-SCNC: 138 MMOL/L (ref 136–145)
WBC # BLD AUTO: 10.77 K/UL (ref 3.9–12.7)

## 2025-04-20 PROCEDURE — 83735 ASSAY OF MAGNESIUM: CPT

## 2025-04-20 PROCEDURE — 85025 COMPLETE CBC W/AUTO DIFF WBC: CPT | Performed by: INTERNAL MEDICINE

## 2025-04-20 PROCEDURE — 85610 PROTHROMBIN TIME: CPT

## 2025-04-20 PROCEDURE — 63600175 PHARM REV CODE 636 W HCPCS: Performed by: INTERNAL MEDICINE

## 2025-04-20 PROCEDURE — 25000003 PHARM REV CODE 250: Performed by: INTERNAL MEDICINE

## 2025-04-20 PROCEDURE — 80053 COMPREHEN METABOLIC PANEL: CPT

## 2025-04-20 PROCEDURE — 36415 COLL VENOUS BLD VENIPUNCTURE: CPT

## 2025-04-20 PROCEDURE — 25000003 PHARM REV CODE 250

## 2025-04-20 PROCEDURE — 25000003 PHARM REV CODE 250: Performed by: PHYSICIAN ASSISTANT

## 2025-04-20 PROCEDURE — 63600175 PHARM REV CODE 636 W HCPCS: Performed by: PHYSICIAN ASSISTANT

## 2025-04-20 PROCEDURE — 84100 ASSAY OF PHOSPHORUS: CPT

## 2025-04-20 PROCEDURE — 11000001 HC ACUTE MED/SURG PRIVATE ROOM

## 2025-04-20 RX ORDER — DIPHENHYDRAMINE HCL 25 MG
50 CAPSULE ORAL EVERY 6 HOURS PRN
Status: CANCELLED | OUTPATIENT
Start: 2025-04-20

## 2025-04-20 RX ORDER — FUROSEMIDE 20 MG/1
40 TABLET ORAL ONCE
Status: COMPLETED | OUTPATIENT
Start: 2025-04-20 | End: 2025-04-20

## 2025-04-20 RX ORDER — HYDROMORPHONE HYDROCHLORIDE 2 MG/ML
3 INJECTION, SOLUTION INTRAMUSCULAR; INTRAVENOUS; SUBCUTANEOUS EVERY 4 HOURS PRN
Status: CANCELLED | OUTPATIENT
Start: 2025-04-20

## 2025-04-20 RX ADMIN — HYDROCODONE BITARTRATE AND ACETAMINOPHEN 1 TABLET: 10; 325 TABLET ORAL at 05:04

## 2025-04-20 RX ADMIN — DIPHENHYDRAMINE HYDROCHLORIDE 25 MG: 50 INJECTION, SOLUTION INTRAMUSCULAR; INTRAVENOUS at 10:04

## 2025-04-20 RX ADMIN — METOCLOPRAMIDE 5 MG: 5 TABLET ORAL at 09:04

## 2025-04-20 RX ADMIN — FUROSEMIDE 40 MG: 20 TABLET ORAL at 11:04

## 2025-04-20 RX ADMIN — HYDROCODONE BITARTRATE AND ACETAMINOPHEN 1 TABLET: 10; 325 TABLET ORAL at 06:04

## 2025-04-20 RX ADMIN — DIPHENHYDRAMINE HYDROCHLORIDE 25 MG: 50 INJECTION, SOLUTION INTRAMUSCULAR; INTRAVENOUS at 11:04

## 2025-04-20 RX ADMIN — MORPHINE SULFATE 30 MG: 30 TABLET, EXTENDED RELEASE ORAL at 09:04

## 2025-04-20 RX ADMIN — DIPHENHYDRAMINE HYDROCHLORIDE 25 MG: 50 INJECTION, SOLUTION INTRAMUSCULAR; INTRAVENOUS at 01:04

## 2025-04-20 RX ADMIN — HYDROMORPHONE HYDROCHLORIDE 3 MG: 2 INJECTION INTRAMUSCULAR; INTRAVENOUS; SUBCUTANEOUS at 11:04

## 2025-04-20 RX ADMIN — WARFARIN SODIUM 2.5 MG: 2.5 TABLET ORAL at 04:04

## 2025-04-20 RX ADMIN — METHOCARBAMOL 500 MG: 500 TABLET ORAL at 09:04

## 2025-04-20 RX ADMIN — PANTOPRAZOLE SODIUM 40 MG: 40 TABLET, DELAYED RELEASE ORAL at 09:04

## 2025-04-20 RX ADMIN — HYDROMORPHONE HYDROCHLORIDE 3 MG: 2 INJECTION INTRAMUSCULAR; INTRAVENOUS; SUBCUTANEOUS at 06:04

## 2025-04-20 RX ADMIN — LACTULOSE 20 G: 20 SOLUTION ORAL at 09:04

## 2025-04-20 RX ADMIN — DIPHENHYDRAMINE HYDROCHLORIDE 25 MG: 50 INJECTION, SOLUTION INTRAMUSCULAR; INTRAVENOUS at 06:04

## 2025-04-20 RX ADMIN — METHOCARBAMOL 500 MG: 500 TABLET ORAL at 01:04

## 2025-04-20 RX ADMIN — GABAPENTIN 600 MG: 300 CAPSULE ORAL at 09:04

## 2025-04-20 RX ADMIN — SUCRALFATE 1 G: 1 TABLET ORAL at 04:04

## 2025-04-20 RX ADMIN — SUCRALFATE 1 G: 1 TABLET ORAL at 09:04

## 2025-04-20 RX ADMIN — METOCLOPRAMIDE 5 MG: 5 TABLET ORAL at 01:04

## 2025-04-20 RX ADMIN — SUCRALFATE 1 G: 1 TABLET ORAL at 11:04

## 2025-04-20 RX ADMIN — AMLODIPINE BESYLATE 10 MG: 10 TABLET ORAL at 09:04

## 2025-04-20 RX ADMIN — HYDROMORPHONE HYDROCHLORIDE 3 MG: 2 INJECTION INTRAMUSCULAR; INTRAVENOUS; SUBCUTANEOUS at 01:04

## 2025-04-20 RX ADMIN — HYDROMORPHONE HYDROCHLORIDE 3 MG: 2 INJECTION INTRAMUSCULAR; INTRAVENOUS; SUBCUTANEOUS at 04:04

## 2025-04-20 RX ADMIN — DIPHENHYDRAMINE HYDROCHLORIDE 25 MG: 50 INJECTION, SOLUTION INTRAMUSCULAR; INTRAVENOUS at 07:04

## 2025-04-20 RX ADMIN — FLUOXETINE HYDROCHLORIDE 80 MG: 20 CAPSULE ORAL at 09:04

## 2025-04-20 RX ADMIN — METHOCARBAMOL 500 MG: 500 TABLET ORAL at 04:04

## 2025-04-20 RX ADMIN — METOCLOPRAMIDE 5 MG: 5 TABLET ORAL at 04:04

## 2025-04-20 RX ADMIN — FOLIC ACID 1 MG: 1 TABLET ORAL at 09:04

## 2025-04-20 RX ADMIN — HYDROCODONE BITARTRATE AND ACETAMINOPHEN 1 TABLET: 10; 325 TABLET ORAL at 02:04

## 2025-04-20 RX ADMIN — HYDROCODONE BITARTRATE AND ACETAMINOPHEN 1 TABLET: 10; 325 TABLET ORAL at 09:04

## 2025-04-20 RX ADMIN — SUCRALFATE 1 G: 1 TABLET ORAL at 05:04

## 2025-04-20 RX ADMIN — HYDROMORPHONE HYDROCHLORIDE 3 MG: 2 INJECTION INTRAMUSCULAR; INTRAVENOUS; SUBCUTANEOUS at 07:04

## 2025-04-20 RX ADMIN — DIPHENHYDRAMINE HYDROCHLORIDE 25 MG: 50 INJECTION, SOLUTION INTRAMUSCULAR; INTRAVENOUS at 04:04

## 2025-04-20 RX ADMIN — GABAPENTIN 600 MG: 300 CAPSULE ORAL at 04:04

## 2025-04-20 RX ADMIN — HYDROCODONE BITARTRATE AND ACETAMINOPHEN 1 TABLET: 10; 325 TABLET ORAL at 01:04

## 2025-04-20 RX ADMIN — HYDROMORPHONE HYDROCHLORIDE 3 MG: 2 INJECTION INTRAMUSCULAR; INTRAVENOUS; SUBCUTANEOUS at 10:04

## 2025-04-20 RX ADMIN — MORPHINE SULFATE 30 MG: 30 TABLET, EXTENDED RELEASE ORAL at 04:04

## 2025-04-20 RX ADMIN — PRAZOSIN HYDROCHLORIDE 1 MG: 1 CAPSULE ORAL at 09:04
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
## 2025-04-20 NOTE — ASSESSMENT & PLAN NOTE
Patient's most recent potassium results are listed below.   Recent Labs     04/18/25  0717 04/19/25  0531 04/20/25  0248   K 4.0 3.8 3.9     EKG showed normal sinus rhythm.    Plan  - Replete potassium per protocol  - Monitor potassium Daily  - Patient's hypokalemia is stable
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
## 2025-04-20 NOTE — ASSESSMENT & PLAN NOTE
Anemia is likely due to acute blood loss from hematoma, chronic blood loss from sickle cell. Most recent hemoglobin and hematocrit are listed below.  Recent Labs     04/18/25  1534 04/19/25  0531 04/20/25  0248   HGB 7.4* 7.1* 7.1*   HCT 22.7* 22.0* 22.4*     Plan  - Monitor serial CBC: Daily  - Transfuse PRBC if patient becomes hemodynamically unstable, symptomatic or H/H drops below 7/21.  - Patient has received 1u units of PRBCs on 4/18  - Patient's anemia is currently stable

## 2025-04-20 NOTE — PLAN OF CARE
Problem: Adult Inpatient Plan of Care  Goal: Plan of Care Review  Outcome: Progressing  Goal: Patient-Specific Goal (Individualized)  Outcome: Progressing  Goal: Absence of Hospital-Acquired Illness or Injury  Outcome: Progressing  Goal: Optimal Comfort and Wellbeing  Outcome: Progressing  Goal: Readiness for Transition of Care  Outcome: Progressing     Problem: Infection  Goal: Absence of Infection Signs and Symptoms  Outcome: Progressing     Problem: Pain Acute  Goal: Optimal Pain Control and Function  Outcome: Progressing     Problem: Pain Chronic (Persistent)  Goal: Optimal Pain Control and Function  Outcome: Progressing     Problem: Fall Injury Risk  Goal: Absence of Fall and Fall-Related Injury  Outcome: Progressing     Problem: Sickle Cell Disease  Goal: Optimal Cerebral Tissue Perfusion  Outcome: Progressing  Goal: Optimal Coping with Sickle Cell Disease  Outcome: Progressing  Goal: Effective Tissue Perfusion  Outcome: Progressing  Goal: Absence of Infection Signs and Symptoms  Outcome: Progressing  Goal: Optimal Pain Control and Function  Outcome: Progressing  Goal: Optimal Oxygenation  Outcome: Progressing
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
## 2025-04-21 LAB
ABSOLUTE EOSINOPHIL (OHS): 0.41 K/UL
ABSOLUTE MONOCYTE (OHS): 1.6 K/UL (ref 0.3–1)
ABSOLUTE NEUTROPHIL COUNT (OHS): 5.58 K/UL (ref 1.8–7.7)
ALBUMIN SERPL BCP-MCNC: 3 G/DL (ref 3.5–5.2)
ALP SERPL-CCNC: 83 UNIT/L (ref 40–150)
ALT SERPL W/O P-5'-P-CCNC: 11 UNIT/L (ref 10–44)
ANION GAP (OHS): 6 MMOL/L (ref 8–16)
AST SERPL-CCNC: 17 UNIT/L (ref 11–45)
BASOPHILS # BLD AUTO: 0.05 K/UL
BASOPHILS NFR BLD AUTO: 0.5 %
BILIRUB SERPL-MCNC: 0.5 MG/DL (ref 0.1–1)
BUN SERPL-MCNC: 9 MG/DL (ref 6–20)
CALCIUM SERPL-MCNC: 8.9 MG/DL (ref 8.7–10.5)
CHLORIDE SERPL-SCNC: 101 MMOL/L (ref 95–110)
CO2 SERPL-SCNC: 30 MMOL/L (ref 23–29)
CREAT SERPL-MCNC: 1 MG/DL (ref 0.5–1.4)
ERYTHROCYTE [DISTWIDTH] IN BLOOD BY AUTOMATED COUNT: 23.7 % (ref 11.5–14.5)
ERYTHROCYTE [DISTWIDTH] IN BLOOD BY AUTOMATED COUNT: 23.8 % (ref 11.5–14.5)
GFR SERPLBLD CREATININE-BSD FMLA CKD-EPI: >60 ML/MIN/1.73/M2
GLUCOSE SERPL-MCNC: 82 MG/DL (ref 70–110)
HCT VFR BLD AUTO: 21.8 % (ref 37–48.5)
HCT VFR BLD AUTO: 23.1 % (ref 37–48.5)
HGB BLD-MCNC: 6.8 GM/DL (ref 12–16)
HGB BLD-MCNC: 7.2 GM/DL (ref 12–16)
IMM GRANULOCYTES # BLD AUTO: 0.03 K/UL (ref 0–0.04)
IMM GRANULOCYTES NFR BLD AUTO: 0.3 % (ref 0–0.5)
INR PPP: 2.5 (ref 0.8–1.2)
LYMPHOCYTES # BLD AUTO: 2.41 K/UL (ref 1–4.8)
MAGNESIUM SERPL-MCNC: 1.7 MG/DL (ref 1.6–2.6)
MCH RBC QN AUTO: 22.3 PG (ref 27–31)
MCH RBC QN AUTO: 22.3 PG (ref 27–31)
MCHC RBC AUTO-ENTMCNC: 31.2 G/DL (ref 32–36)
MCHC RBC AUTO-ENTMCNC: 31.2 G/DL (ref 32–36)
MCV RBC AUTO: 72 FL (ref 82–98)
MCV RBC AUTO: 72 FL (ref 82–98)
NUCLEATED RBC (/100WBC) (OHS): 6 /100 WBC
PHOSPHATE SERPL-MCNC: 3.5 MG/DL (ref 2.7–4.5)
PLATELET # BLD AUTO: 285 K/UL (ref 150–450)
PLATELET # BLD AUTO: 326 K/UL (ref 150–450)
PMV BLD AUTO: 10 FL (ref 9.2–12.9)
PMV BLD AUTO: 9.3 FL (ref 9.2–12.9)
POTASSIUM SERPL-SCNC: 3.8 MMOL/L (ref 3.5–5.1)
PROT SERPL-MCNC: 6.8 GM/DL (ref 6–8.4)
PROTHROMBIN TIME: 25.6 SECONDS (ref 9–12.5)
RBC # BLD AUTO: 3.05 M/UL (ref 4–5.4)
RBC # BLD AUTO: 3.23 M/UL (ref 4–5.4)
RELATIVE EOSINOPHIL (OHS): 4.1 %
RELATIVE LYMPHOCYTE (OHS): 23.9 % (ref 18–48)
RELATIVE MONOCYTE (OHS): 15.9 % (ref 4–15)
RELATIVE NEUTROPHIL (OHS): 55.3 % (ref 38–73)
SODIUM SERPL-SCNC: 137 MMOL/L (ref 136–145)
WBC # BLD AUTO: 10.08 K/UL (ref 3.9–12.7)
WBC # BLD AUTO: 9.58 K/UL (ref 3.9–12.7)

## 2025-04-21 PROCEDURE — 36415 COLL VENOUS BLD VENIPUNCTURE: CPT | Performed by: INTERNAL MEDICINE

## 2025-04-21 PROCEDURE — 85610 PROTHROMBIN TIME: CPT

## 2025-04-21 PROCEDURE — 85025 COMPLETE CBC W/AUTO DIFF WBC: CPT | Performed by: INTERNAL MEDICINE

## 2025-04-21 PROCEDURE — 63600175 PHARM REV CODE 636 W HCPCS: Performed by: INTERNAL MEDICINE

## 2025-04-21 PROCEDURE — 25000003 PHARM REV CODE 250: Performed by: INTERNAL MEDICINE

## 2025-04-21 PROCEDURE — 25000003 PHARM REV CODE 250: Performed by: PHYSICIAN ASSISTANT

## 2025-04-21 PROCEDURE — 85027 COMPLETE CBC AUTOMATED: CPT | Performed by: INTERNAL MEDICINE

## 2025-04-21 PROCEDURE — 84100 ASSAY OF PHOSPHORUS: CPT

## 2025-04-21 PROCEDURE — 11000001 HC ACUTE MED/SURG PRIVATE ROOM

## 2025-04-21 PROCEDURE — 83735 ASSAY OF MAGNESIUM: CPT

## 2025-04-21 PROCEDURE — 36415 COLL VENOUS BLD VENIPUNCTURE: CPT

## 2025-04-21 PROCEDURE — 63600175 PHARM REV CODE 636 W HCPCS: Performed by: PHYSICIAN ASSISTANT

## 2025-04-21 PROCEDURE — 82310 ASSAY OF CALCIUM: CPT

## 2025-04-21 PROCEDURE — 25000003 PHARM REV CODE 250

## 2025-04-21 RX ADMIN — GABAPENTIN 600 MG: 300 CAPSULE ORAL at 02:04

## 2025-04-21 RX ADMIN — METOCLOPRAMIDE 5 MG: 5 TABLET ORAL at 09:04

## 2025-04-21 RX ADMIN — FOLIC ACID 1 MG: 1 TABLET ORAL at 09:04

## 2025-04-21 RX ADMIN — SENNOSIDES AND DOCUSATE SODIUM 1 TABLET: 50; 8.6 TABLET ORAL at 01:04

## 2025-04-21 RX ADMIN — HYDROCODONE BITARTRATE AND ACETAMINOPHEN 1 TABLET: 10; 325 TABLET ORAL at 05:04

## 2025-04-21 RX ADMIN — DIPHENHYDRAMINE HYDROCHLORIDE 25 MG: 50 INJECTION, SOLUTION INTRAMUSCULAR; INTRAVENOUS at 12:04

## 2025-04-21 RX ADMIN — HYDROMORPHONE HYDROCHLORIDE 3 MG: 2 INJECTION INTRAMUSCULAR; INTRAVENOUS; SUBCUTANEOUS at 12:04

## 2025-04-21 RX ADMIN — SUCRALFATE 1 G: 1 TABLET ORAL at 05:04

## 2025-04-21 RX ADMIN — HYDROCODONE BITARTRATE AND ACETAMINOPHEN 1 TABLET: 10; 325 TABLET ORAL at 01:04

## 2025-04-21 RX ADMIN — HYDROMORPHONE HYDROCHLORIDE 3 MG: 2 INJECTION INTRAMUSCULAR; INTRAVENOUS; SUBCUTANEOUS at 08:04

## 2025-04-21 RX ADMIN — SUCRALFATE 1 G: 1 TABLET ORAL at 08:04

## 2025-04-21 RX ADMIN — HYDROMORPHONE HYDROCHLORIDE 3 MG: 2 INJECTION INTRAMUSCULAR; INTRAVENOUS; SUBCUTANEOUS at 05:04

## 2025-04-21 RX ADMIN — DIPHENHYDRAMINE HYDROCHLORIDE 25 MG: 50 INJECTION, SOLUTION INTRAMUSCULAR; INTRAVENOUS at 06:04

## 2025-04-21 RX ADMIN — HYDROMORPHONE HYDROCHLORIDE 3 MG: 2 INJECTION INTRAMUSCULAR; INTRAVENOUS; SUBCUTANEOUS at 09:04

## 2025-04-21 RX ADMIN — METHOCARBAMOL 500 MG: 500 TABLET ORAL at 12:04

## 2025-04-21 RX ADMIN — DIPHENHYDRAMINE HYDROCHLORIDE 25 MG: 50 INJECTION, SOLUTION INTRAMUSCULAR; INTRAVENOUS at 09:04

## 2025-04-21 RX ADMIN — DIPHENHYDRAMINE HYDROCHLORIDE 25 MG: 50 INJECTION, SOLUTION INTRAMUSCULAR; INTRAVENOUS at 03:04

## 2025-04-21 RX ADMIN — HYDROCODONE BITARTRATE AND ACETAMINOPHEN 1 TABLET: 10; 325 TABLET ORAL at 12:04

## 2025-04-21 RX ADMIN — MORPHINE SULFATE 30 MG: 30 TABLET, EXTENDED RELEASE ORAL at 02:04

## 2025-04-21 RX ADMIN — PANTOPRAZOLE SODIUM 40 MG: 40 TABLET, DELAYED RELEASE ORAL at 08:04

## 2025-04-21 RX ADMIN — METHOCARBAMOL 500 MG: 500 TABLET ORAL at 09:04

## 2025-04-21 RX ADMIN — METHOCARBAMOL 500 MG: 500 TABLET ORAL at 05:04

## 2025-04-21 RX ADMIN — HYDROMORPHONE HYDROCHLORIDE 3 MG: 2 INJECTION INTRAMUSCULAR; INTRAVENOUS; SUBCUTANEOUS at 03:04

## 2025-04-21 RX ADMIN — DIPHENHYDRAMINE HYDROCHLORIDE 25 MG: 50 INJECTION, SOLUTION INTRAMUSCULAR; INTRAVENOUS at 01:04

## 2025-04-21 RX ADMIN — LACTULOSE 20 G: 20 SOLUTION ORAL at 09:04

## 2025-04-21 RX ADMIN — PRAZOSIN HYDROCHLORIDE 1 MG: 1 CAPSULE ORAL at 08:04

## 2025-04-21 RX ADMIN — PANTOPRAZOLE SODIUM 40 MG: 40 TABLET, DELAYED RELEASE ORAL at 09:04

## 2025-04-21 RX ADMIN — METOCLOPRAMIDE 5 MG: 5 TABLET ORAL at 08:04

## 2025-04-21 RX ADMIN — MORPHINE SULFATE 30 MG: 30 TABLET, EXTENDED RELEASE ORAL at 10:04

## 2025-04-21 RX ADMIN — HYDROCODONE BITARTRATE AND ACETAMINOPHEN 1 TABLET: 10; 325 TABLET ORAL at 10:04

## 2025-04-21 RX ADMIN — METOCLOPRAMIDE 5 MG: 5 TABLET ORAL at 12:04

## 2025-04-21 RX ADMIN — MORPHINE SULFATE 30 MG: 30 TABLET, EXTENDED RELEASE ORAL at 09:04

## 2025-04-21 RX ADMIN — HYDROMORPHONE HYDROCHLORIDE 3 MG: 2 INJECTION INTRAMUSCULAR; INTRAVENOUS; SUBCUTANEOUS at 06:04

## 2025-04-21 RX ADMIN — METOCLOPRAMIDE 5 MG: 5 TABLET ORAL at 05:04

## 2025-04-21 RX ADMIN — HYDROMORPHONE HYDROCHLORIDE 3 MG: 2 INJECTION INTRAMUSCULAR; INTRAVENOUS; SUBCUTANEOUS at 01:04

## 2025-04-21 RX ADMIN — FLUOXETINE HYDROCHLORIDE 80 MG: 20 CAPSULE ORAL at 09:04

## 2025-04-21 RX ADMIN — GABAPENTIN 600 MG: 300 CAPSULE ORAL at 08:04

## 2025-04-21 RX ADMIN — METHOCARBAMOL 500 MG: 500 TABLET ORAL at 08:04

## 2025-04-21 RX ADMIN — WARFARIN SODIUM 2.5 MG: 2.5 TABLET ORAL at 05:04

## 2025-04-21 RX ADMIN — AMLODIPINE BESYLATE 10 MG: 10 TABLET ORAL at 09:04

## 2025-04-21 RX ADMIN — SUCRALFATE 1 G: 1 TABLET ORAL at 10:04

## 2025-04-21 RX ADMIN — DIPHENHYDRAMINE HYDROCHLORIDE 25 MG: 50 INJECTION, SOLUTION INTRAMUSCULAR; INTRAVENOUS at 08:04

## 2025-04-21 RX ADMIN — GABAPENTIN 600 MG: 300 CAPSULE ORAL at 09:04

## 2025-04-21 RX ADMIN — DIPHENHYDRAMINE HYDROCHLORIDE 25 MG: 50 INJECTION, SOLUTION INTRAMUSCULAR; INTRAVENOUS at 05:04

## 2025-04-21 NOTE — PLAN OF CARE
[facility] - Internal Medicine Telemetry  Discharge Reassessment    Primary Care Provider: No, Primary Doctor    Expected Discharge Date: 4/24/2025    Reassessment (most recent)       Discharge Reassessment - 04/21/25 1550          Discharge Reassessment    Assessment Type Discharge Planning Reassessment (P)      Did the patient's condition or plan change since previous assessment? No (P)      Discharge Plan discussed with: Patient (P)      Communicated ALYSE with patient/caregiver Yes (P)      Discharge Plan A Home with family;Home Health (P)      Discharge Plan B Home with family (P)      DME Needed Upon Discharge  none (P)      Transition of Care Barriers None (P)      Why the patient remains in the hospital Requires continued medical care (P)         Post-Acute Status    Hospital Resources/Appts/Education Provided Provided education on problems/symptoms using teachback (P)                  Discharge Plan A and Plan B have been determined by review of patient's clinical status, future medical and therapeutic needs, and coverage/benefits for post-acute care in coordination with multidisciplinary team members.                     Nick Francisco, ADRIEN, LMSW  [facility]  Case Management  Ext. [phone]

## 2025-04-21 NOTE — SUBJECTIVE & OBJECTIVE
Interval History:  Interviewed and examined at the bedside on morning rounds.  Continues to have significant right chest wall pain, no further discharge from her nipple.  Reviewed morphine equivalents, utilized 560/630 eligible morphine equivalents on 04/20, down from 600 morphine equivalents on 04/19.  Appears to be self weaning based off of pain control, continue current plan of care with oral and IV pain medication.  Labs reviewed together from 4/20 in the morning and 4/21 reviewed by myself in the afternoon, hemoglobin is 7.2 this morning no indication for transfusion at this time.  Did have some abdominal wall discomfort later in the day, obtained additional CBC this afternoon to evaluate for any further hematoma expansion/anemia related to the hematomas.    On exam her skin has a type of peau d'orange appearance overlying the ecchymoses, similar to lymphatic system blockage, such as when breastfeeding mother's have duct blockages.  She is familiar with this from her prior pregnancies, and we did discuss general surgery's surgical bra compression recommendations, pain control, and gentle lymphatic drainage techniques.    Review of Systems  Objective:     Vital Signs (Most Recent):  Temp: 98.9 °F (37.2 °C) (04/21/25 1520)  Pulse: 101 (04/21/25 1520)  Resp: 18 (04/21/25 1520)  BP: 113/75 (04/21/25 1520)  SpO2: 97 % (04/21/25 1520) Vital Signs (24h Range):  Temp:  [98.2 °F (36.8 °C)-99.4 °F (37.4 °C)] 98.9 °F (37.2 °C)  Pulse:  [] 101  Resp:  [16-18] 18  SpO2:  [93 %-97 %] 97 %  BP: (113-153)/(62-77) 113/75     Weight: 93.9 kg (207 lb 0.2 oz)  Body mass index is 37.86 kg/m².    Intake/Output Summary (Last 24 hours) at 4/21/2025 1619  Last data filed at 4/21/2025 1200  Gross per 24 hour   Intake 480 ml   Output --   Net 480 ml      Physical Exam  HENT:      Head: Normocephalic.      Nose: Nose normal. No congestion.      Mouth/Throat:      Mouth: Mucous membranes are moist.      Pharynx: Oropharynx is clear.    Eyes:      General: No scleral icterus.     Conjunctiva/sclera: Conjunctivae normal.   Musculoskeletal:      Comments: Right chest wall hematoma appears stable related to prior exam and documentation, there is some expected rigid appearance of the skin from the right nipple to her axilla, no warmth or discharge.   Neurological:      Mental Status: She is alert.           Significant Labs:  CBC:  Recent Labs   Lab 04/20/25 0248 04/21/25  0715   WBC 10.77 10.08   HGB 7.1* 7.2*   HCT 22.4* 23.1*    326     CMP:  Recent Labs   Lab 04/20/25 0248 04/21/25  0715    137   K 3.9 3.8    101   CO2 30* 30*   BUN 11 9   CREATININE 1.1 1.0   CALCIUM 8.5* 8.9   ALBUMIN 3.0* 3.0*   BILITOT 0.4 0.5   ALKPHOS 78 83   AST 16 17   ALT 11 11   ANIONGAP 5* 6*     PTINR:  Recent Labs   Lab 04/20/25 0247 04/21/25  0715   INR 2.1* 2.5*
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
## 2025-04-21 NOTE — CONSULTS
PHARMACY CONSULT NOTE: WARFARIN  Nazanin Malone is a 45 y.o. female on warfarin therapy for DVT/PE. PharmD has been consulted for warfarin dosing.    Current order: Warfarin 2.5 mg  Home dose: Warfarin 2.5 mg daily (adjusted 4/11 when patient's INR noted to be 7.9 after warfarin 5 mg MW (4/7, 4/9))   Coumadin clinic enrollment: Active  INR goal: 2-3    Lab Results   Component Value Date    INR 2.5 (H) 04/21/2025    INR 2.1 (H) 04/20/2025    INR 2.3 (H) 04/19/2025     Significant drug interactions: none  Diet: Adult Regular without supplements     Recommendation(s):   INR at goal, 2.1 > 2.5. Continue Warfarin 2.5 mg daily  Daily INR  Pharmacy will continue to follow and monitor warfarin    Thank you for the consult,  Dayami Knott, PharmD  Extension - [phone]    **Note: Consults are reviewed Monday-Friday 7:00am-3:30pm. THE ABOVE RECOMMENDATIONS ARE ONLY SUGGESTED.THE RECOMMENDATIONS SHOULD BE CONSIDERED IN CONJUNCTION WITH ALL PATIENT FACTORS. **

## 2025-04-21 NOTE — ASSESSMENT & PLAN NOTE
Patient's most recent potassium results are listed below.   Recent Labs     04/19/25  0531 04/20/25  0248 04/21/25  0715   K 3.8 3.9 3.8     EKG showed normal sinus rhythm.    Plan  - Replete potassium per protocol  - Monitor potassium Daily  - Patient's hypokalemia is stable

## 2025-04-21 NOTE — ASSESSMENT & PLAN NOTE
Anemia is likely due to acute blood loss from hematoma, chronic blood loss from sickle cell. Most recent hemoglobin and hematocrit are listed below.  Recent Labs     04/19/25  0531 04/20/25  0248 04/21/25  0715   HGB 7.1* 7.1* 7.2*   HCT 22.0* 22.4* 23.1*     Plan  - Monitor serial CBC: Daily  - Transfuse PRBC if patient becomes hemodynamically unstable, symptomatic or H/H drops below 7/21.  - Patient has received 1u units of PRBCs on 4/18  - Patient's anemia is currently stable
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
## 2025-04-22 LAB
ABO + RH BLD: NORMAL
BLD PROD TYP BPU: NORMAL
BLOOD GROUP ANTIBODIES SERPL: NORMAL
BLOOD UNIT EXPIRATION DATE: NORMAL
BLOOD UNIT TYPE CODE: 9500
CROSSMATCH INTERPRETATION: NORMAL
DISPENSE STATUS: NORMAL
ERYTHROCYTE [DISTWIDTH] IN BLOOD BY AUTOMATED COUNT: 23.9 % (ref 11.5–14.5)
HCT VFR BLD AUTO: 23.6 % (ref 37–48.5)
HGB BLD-MCNC: 7.5 GM/DL (ref 12–16)
INDIRECT COOMBS: ABNORMAL
INR PPP: 2.3 (ref 0.8–1.2)
MCH RBC QN AUTO: 22.5 PG (ref 27–31)
MCHC RBC AUTO-ENTMCNC: 31.8 G/DL (ref 32–36)
MCV RBC AUTO: 71 FL (ref 82–98)
PLATELET # BLD AUTO: 330 K/UL (ref 150–450)
PMV BLD AUTO: 9.4 FL (ref 9.2–12.9)
PROTHROMBIN TIME: 23.5 SECONDS (ref 9–12.5)
RBC # BLD AUTO: 3.33 M/UL (ref 4–5.4)
RH BLD: ABNORMAL
SPECIMEN OUTDATE: ABNORMAL
UNIT NUMBER: NORMAL
WBC # BLD AUTO: 8.47 K/UL (ref 3.9–12.7)

## 2025-04-22 PROCEDURE — 85610 PROTHROMBIN TIME: CPT

## 2025-04-22 PROCEDURE — P9016 RBC LEUKOCYTES REDUCED: HCPCS | Performed by: INTERNAL MEDICINE

## 2025-04-22 PROCEDURE — 86900 BLOOD TYPING SEROLOGIC ABO: CPT | Performed by: INTERNAL MEDICINE

## 2025-04-22 PROCEDURE — 86902 BLOOD TYPE ANTIGEN DONOR EA: CPT | Performed by: INTERNAL MEDICINE

## 2025-04-22 PROCEDURE — 25000003 PHARM REV CODE 250

## 2025-04-22 PROCEDURE — 36430 TRANSFUSION BLD/BLD COMPNT: CPT

## 2025-04-22 PROCEDURE — 86870 RBC ANTIBODY IDENTIFICATION: CPT | Performed by: INTERNAL MEDICINE

## 2025-04-22 PROCEDURE — 36415 COLL VENOUS BLD VENIPUNCTURE: CPT

## 2025-04-22 PROCEDURE — 63600175 PHARM REV CODE 636 W HCPCS: Performed by: PHYSICIAN ASSISTANT

## 2025-04-22 PROCEDURE — 11000001 HC ACUTE MED/SURG PRIVATE ROOM

## 2025-04-22 PROCEDURE — 85027 COMPLETE CBC AUTOMATED: CPT | Performed by: INTERNAL MEDICINE

## 2025-04-22 PROCEDURE — 25000003 PHARM REV CODE 250: Performed by: INTERNAL MEDICINE

## 2025-04-22 PROCEDURE — 63600175 PHARM REV CODE 636 W HCPCS: Performed by: INTERNAL MEDICINE

## 2025-04-22 PROCEDURE — 86922 COMPATIBILITY TEST ANTIGLOB: CPT | Performed by: INTERNAL MEDICINE

## 2025-04-22 PROCEDURE — 25000003 PHARM REV CODE 250: Performed by: PHYSICIAN ASSISTANT

## 2025-04-22 PROCEDURE — 36415 COLL VENOUS BLD VENIPUNCTURE: CPT | Performed by: INTERNAL MEDICINE

## 2025-04-22 RX ORDER — HYDROCODONE BITARTRATE AND ACETAMINOPHEN 500; 5 MG/1; MG/1
TABLET ORAL
Status: DISCONTINUED | OUTPATIENT
Start: 2025-04-22 | End: 2025-04-28 | Stop reason: HOSPADM

## 2025-04-22 RX ADMIN — GABAPENTIN 600 MG: 300 CAPSULE ORAL at 03:04

## 2025-04-22 RX ADMIN — MORPHINE SULFATE 30 MG: 30 TABLET, EXTENDED RELEASE ORAL at 08:04

## 2025-04-22 RX ADMIN — HYDROMORPHONE HYDROCHLORIDE 3 MG: 2 INJECTION INTRAMUSCULAR; INTRAVENOUS; SUBCUTANEOUS at 07:04

## 2025-04-22 RX ADMIN — METHOCARBAMOL 500 MG: 500 TABLET ORAL at 01:04

## 2025-04-22 RX ADMIN — GABAPENTIN 600 MG: 300 CAPSULE ORAL at 09:04

## 2025-04-22 RX ADMIN — DIPHENHYDRAMINE HYDROCHLORIDE 25 MG: 50 INJECTION, SOLUTION INTRAMUSCULAR; INTRAVENOUS at 12:04

## 2025-04-22 RX ADMIN — METOCLOPRAMIDE 5 MG: 5 TABLET ORAL at 08:04

## 2025-04-22 RX ADMIN — HYDROCODONE BITARTRATE AND ACETAMINOPHEN 1 TABLET: 10; 325 TABLET ORAL at 01:04

## 2025-04-22 RX ADMIN — METOCLOPRAMIDE 5 MG: 5 TABLET ORAL at 01:04

## 2025-04-22 RX ADMIN — DIPHENHYDRAMINE HYDROCHLORIDE 25 MG: 50 INJECTION, SOLUTION INTRAMUSCULAR; INTRAVENOUS at 06:04

## 2025-04-22 RX ADMIN — HYDROCODONE BITARTRATE AND ACETAMINOPHEN 1 TABLET: 10; 325 TABLET ORAL at 10:04

## 2025-04-22 RX ADMIN — HYDROCODONE BITARTRATE AND ACETAMINOPHEN 1 TABLET: 10; 325 TABLET ORAL at 09:04

## 2025-04-22 RX ADMIN — AMLODIPINE BESYLATE 10 MG: 10 TABLET ORAL at 08:04

## 2025-04-22 RX ADMIN — SUCRALFATE 1 G: 1 TABLET ORAL at 06:04

## 2025-04-22 RX ADMIN — WARFARIN SODIUM 2.5 MG: 2.5 TABLET ORAL at 04:04

## 2025-04-22 RX ADMIN — HYDROMORPHONE HYDROCHLORIDE 3 MG: 2 INJECTION INTRAMUSCULAR; INTRAVENOUS; SUBCUTANEOUS at 09:04

## 2025-04-22 RX ADMIN — DIPHENHYDRAMINE HYDROCHLORIDE 25 MG: 50 INJECTION, SOLUTION INTRAMUSCULAR; INTRAVENOUS at 09:04

## 2025-04-22 RX ADMIN — DIPHENHYDRAMINE HYDROCHLORIDE 25 MG: 50 INJECTION, SOLUTION INTRAMUSCULAR; INTRAVENOUS at 03:04

## 2025-04-22 RX ADMIN — MORPHINE SULFATE 30 MG: 30 TABLET, EXTENDED RELEASE ORAL at 09:04

## 2025-04-22 RX ADMIN — HYDROMORPHONE HYDROCHLORIDE 3 MG: 2 INJECTION INTRAMUSCULAR; INTRAVENOUS; SUBCUTANEOUS at 03:04

## 2025-04-22 RX ADMIN — SUCRALFATE 1 G: 1 TABLET ORAL at 09:04

## 2025-04-22 RX ADMIN — METHOCARBAMOL 500 MG: 500 TABLET ORAL at 04:04

## 2025-04-22 RX ADMIN — PANTOPRAZOLE SODIUM 40 MG: 40 TABLET, DELAYED RELEASE ORAL at 08:04

## 2025-04-22 RX ADMIN — HYDROMORPHONE HYDROCHLORIDE 3 MG: 2 INJECTION INTRAMUSCULAR; INTRAVENOUS; SUBCUTANEOUS at 10:04

## 2025-04-22 RX ADMIN — METOCLOPRAMIDE 5 MG: 5 TABLET ORAL at 04:04

## 2025-04-22 RX ADMIN — FLUOXETINE HYDROCHLORIDE 80 MG: 20 CAPSULE ORAL at 08:04

## 2025-04-22 RX ADMIN — METHOCARBAMOL 500 MG: 500 TABLET ORAL at 08:04

## 2025-04-22 RX ADMIN — SUCRALFATE 1 G: 1 TABLET ORAL at 04:04

## 2025-04-22 RX ADMIN — GABAPENTIN 600 MG: 300 CAPSULE ORAL at 08:04

## 2025-04-22 RX ADMIN — SUCRALFATE 1 G: 1 TABLET ORAL at 10:04

## 2025-04-22 RX ADMIN — DIPHENHYDRAMINE HYDROCHLORIDE 25 MG: 50 INJECTION, SOLUTION INTRAMUSCULAR; INTRAVENOUS at 07:04

## 2025-04-22 RX ADMIN — PRAZOSIN HYDROCHLORIDE 1 MG: 1 CAPSULE ORAL at 09:04

## 2025-04-22 RX ADMIN — MORPHINE SULFATE 30 MG: 30 TABLET, EXTENDED RELEASE ORAL at 03:04

## 2025-04-22 RX ADMIN — LACTULOSE 20 G: 20 SOLUTION ORAL at 08:04

## 2025-04-22 RX ADMIN — DIPHENHYDRAMINE HYDROCHLORIDE 25 MG: 50 INJECTION, SOLUTION INTRAMUSCULAR; INTRAVENOUS at 10:04

## 2025-04-22 RX ADMIN — HYDROMORPHONE HYDROCHLORIDE 3 MG: 2 INJECTION INTRAMUSCULAR; INTRAVENOUS; SUBCUTANEOUS at 12:04

## 2025-04-22 RX ADMIN — PANTOPRAZOLE SODIUM 40 MG: 40 TABLET, DELAYED RELEASE ORAL at 09:04

## 2025-04-22 RX ADMIN — FOLIC ACID 1 MG: 1 TABLET ORAL at 08:04

## 2025-04-22 RX ADMIN — HYDROCODONE BITARTRATE AND ACETAMINOPHEN 1 TABLET: 10; 325 TABLET ORAL at 05:04

## 2025-04-22 RX ADMIN — METOCLOPRAMIDE 5 MG: 5 TABLET ORAL at 09:04

## 2025-04-22 RX ADMIN — HYDROMORPHONE HYDROCHLORIDE 3 MG: 2 INJECTION INTRAMUSCULAR; INTRAVENOUS; SUBCUTANEOUS at 06:04

## 2025-04-22 RX ADMIN — METHOCARBAMOL 500 MG: 500 TABLET ORAL at 09:04

## 2025-04-22 NOTE — PLAN OF CARE
POC reviewed with patient this shift.  A/O x4.  Respirations unlabored on RA.  Skin w/d.  Continent of b/b.  Ambulates to restroom without difficulty.  Pain continues and being managed with around the clock dosing.  Tolerates meds whole with water without difficulty.  VSS.  See flowsheet for full assessment.  Able to verbalize wants/needs.  No s/s of distress.  Fall/safety precautions maintained.      Problem: Adult Inpatient Plan of Care  Goal: Plan of Care Review  Outcome: Progressing     Problem: Infection  Goal: Absence of Infection Signs and Symptoms  Outcome: Progressing     Problem: Pain Acute  Goal: Optimal Pain Control and Function  Outcome: Not Progressing     Problem: Pain Chronic (Persistent)  Goal: Optimal Pain Control and Function  Outcome: Not Progressing     Problem: Fall Injury Risk  Goal: Absence of Fall and Fall-Related Injury  Outcome: Progressing

## 2025-04-22 NOTE — ASSESSMENT & PLAN NOTE
Anemia is likely due to acute blood loss from hematoma, chronic blood loss from sickle cell. Most recent hemoglobin and hematocrit are listed below.  Recent Labs     04/21/25  0715 04/21/25  1620 04/22/25  0550   HGB 7.2* 6.8* 7.5*   HCT 23.1* 21.8* 23.6*     Plan  - Monitor serial CBC: Daily  - Transfuse PRBC if patient becomes hemodynamically unstable, symptomatic or H/H drops below 7/21.  - Patient has received 1u units of PRBCs on 4/18and 4/22  - Patient's anemia is currently worsening. Will adjust treatment as follows:  Transfuse 1 unit PRBC

## 2025-04-22 NOTE — CONSULTS
PHARMACY CONSULT NOTE: WARFARIN  Nazanin Malone is a 45 y.o. female on warfarin therapy for DVT/PE. PharmD has been consulted for warfarin dosing.    Current order: Warfarin 2.5 mg  Home dose: Warfarin 2.5 mg daily (adjusted 4/11 when patient's INR noted to be 7.9 after warfarin 5 mg MW (4/7, 4/9))   Coumadin clinic enrollment: Active  INR goal: 2-3    Lab Results   Component Value Date    INR 2.3 (H) 04/22/2025    INR 2.5 (H) 04/21/2025    INR 2.1 (H) 04/20/2025     Significant drug interactions: none  Diet: Adult Regular without supplements     Recommendation(s):   INR at goal. Continue Warfarin 2.5 mg daily  Daily INR  Pharmacy will continue to follow and monitor warfarin    Thank you for the consult,  Dayami Knott PharmD  Extension - [phone]    **Note: Consults are reviewed Monday-Friday 7:00am-3:30pm. THE ABOVE RECOMMENDATIONS ARE ONLY SUGGESTED.THE RECOMMENDATIONS SHOULD BE CONSIDERED IN CONJUNCTION WITH ALL PATIENT FACTORS. **

## 2025-04-22 NOTE — SUBJECTIVE & OBJECTIVE
Interval History:  Swelling of her right chest wall does appear slightly larger on examination, hemoglobin check after increase in pain noted to be 6.8.  Given the concern for continued oozing, reasonable to transfuse in the setting of known coagulopathy from warfarin.  Discussed this with the patient, she is amenable.  Reviewed medications, received 560/630 eligible morphine equivalents yesterday, stable from the day prior.  Home morphine equivalents approximately 90, significant time expected for this wean given the ongoing management of the hematoma as noted.    Patient's aunt present for today's bedside rounds via telephone, all questions answered to both of them.    Review of Systems  Objective:     Vital Signs (Most Recent):  Temp: 99.3 °F (37.4 °C) (04/22/25 1346)  Pulse: 96 (04/22/25 1346)  Resp: 18 (04/22/25 1354)  BP: 120/82 (04/22/25 1346)  SpO2: 96 % (04/22/25 1346) Vital Signs (24h Range):  Temp:  [98.6 °F (37 °C)-99.5 °F (37.5 °C)] 99.3 °F (37.4 °C)  Pulse:  [] 96  Resp:  [16-20] 18  SpO2:  [93 %-97 %] 96 %  BP: (108-148)/(71-87) 120/82     Weight: 93.9 kg (207 lb 0.2 oz)  Body mass index is 37.86 kg/m².    Intake/Output Summary (Last 24 hours) at 4/22/2025 1438  Last data filed at 4/22/2025 0839  Gross per 24 hour   Intake 600 ml   Output --   Net 600 ml      Physical Exam  HENT:      Head: Normocephalic.      Nose: Nose normal. No congestion.      Mouth/Throat:      Mouth: Mucous membranes are moist.      Pharynx: Oropharynx is clear.   Eyes:      General: No scleral icterus.     Conjunctiva/sclera: Conjunctivae normal.   Musculoskeletal:      Comments: Right chest wall hematoma appears stable to slightly increased related to prior exam and documentation, there is some expected rigid appearance of the skin from the right nipple to her axilla, no warmth or discharge.   Neurological:      Mental Status: She is alert.         MELD 3.0: 16 at 4/22/2025  5:50 AM  MELD-Na: 16 at 4/22/2025  5:50  AM  Calculated from:  Serum Creatinine: 1 mg/dL at 4/21/2025  7:15 AM  Serum Sodium: 137 mmol/L at 4/21/2025  7:15 AM  Total Bilirubin: 0.5 mg/dL (Using min of 1 mg/dL) at 4/21/2025  7:15 AM  Serum Albumin: 3 g/dL at 4/21/2025  7:15 AM  INR(ratio): 2.3 at 4/22/2025  5:50 AM  Age at listing (hypothetical): 45 years  Sex: Female at 4/22/2025  5:50 AM      Significant Labs:  CBC:  Recent Labs   Lab 04/21/25  0715 04/21/25  1620 04/22/25  0550   WBC 10.08 9.58 8.47   HGB 7.2* 6.8* 7.5*   HCT 23.1* 21.8* 23.6*    285 330     CMP:  Recent Labs   Lab 04/21/25  0715      K 3.8      CO2 30*   BUN 9   CREATININE 1.0   CALCIUM 8.9   ALBUMIN 3.0*   BILITOT 0.5   ALKPHOS 83   AST 17   ALT 11   ANIONGAP 6*     PTINR:  Recent Labs   Lab 04/21/25  0715 04/22/25  0550   INR 2.5* 2.3*
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
## 2025-04-22 NOTE — ASSESSMENT & PLAN NOTE
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 Had been on apixiban and then Lovenox due to insurance coverage issues.  Now on coumadin with supratherapeutic INR.    - s/p vit K  - INR now therapeutic  - pharm following for coumadin management.  - daily PT/INRs     Patient's belongings returned

## 2025-04-22 NOTE — ASSESSMENT & PLAN NOTE
Patient's most recent potassium results are listed below.   Recent Labs     04/20/25  0248 04/21/25  0715   K 3.9 3.8     EKG showed normal sinus rhythm.    Plan  - Replete potassium per protocol  - Monitor potassium Daily  - Patient's hypokalemia is stable
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
## 2025-04-22 NOTE — PLAN OF CARE
Problem: Adult Inpatient Plan of Care  Goal: Plan of Care Review  Outcome: Progressing  Goal: Patient-Specific Goal (Individualized)  Outcome: Progressing  Goal: Absence of Hospital-Acquired Illness or Injury  Outcome: Progressing  Goal: Optimal Comfort and Wellbeing  Outcome: Progressing  Goal: Readiness for Transition of Care  Outcome: Progressing     Problem: Infection  Goal: Absence of Infection Signs and Symptoms  Outcome: Progressing     Problem: Pain Acute  Goal: Optimal Pain Control and Function  Outcome: Progressing     Problem: Pain Chronic (Persistent)  Goal: Optimal Pain Control and Function  Outcome: Progressing
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
## 2025-04-22 NOTE — ASSESSMENT & PLAN NOTE
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 Had been on apixiban and then Lovenox due to insurance coverage issues.  Now on coumadin with supratherapeutic INR.    - s/p vit K  - INR now therapeutic  - pharm following for coumadin management.  - daily PT/INRs

## 2025-04-23 PROBLEM — D62 ACUTE BLOOD LOSS ANEMIA: Status: ACTIVE | Noted: 2025-04-18

## 2025-04-23 PROBLEM — D68.9 COAGULOPATHY: Status: ACTIVE | Noted: 2025-04-23

## 2025-04-23 LAB
BACTERIA BLD CULT: NORMAL
BACTERIA BLD CULT: NORMAL
ERYTHROCYTE [DISTWIDTH] IN BLOOD BY AUTOMATED COUNT: 24.6 % (ref 11.5–14.5)
HCT VFR BLD AUTO: 25.1 % (ref 37–48.5)
HGB BLD-MCNC: 8.3 GM/DL (ref 12–16)
INR PPP: 1.9 (ref 0.8–1.2)
MCH RBC QN AUTO: 23.8 PG (ref 27–31)
MCHC RBC AUTO-ENTMCNC: 33.1 G/DL (ref 32–36)
MCV RBC AUTO: 72 FL (ref 82–98)
PLATELET # BLD AUTO: 314 K/UL (ref 150–450)
PMV BLD AUTO: 9.8 FL (ref 9.2–12.9)
PROTHROMBIN TIME: 19.7 SECONDS (ref 9–12.5)
RBC # BLD AUTO: 3.49 M/UL (ref 4–5.4)
WBC # BLD AUTO: 7.75 K/UL (ref 3.9–12.7)

## 2025-04-23 PROCEDURE — 36415 COLL VENOUS BLD VENIPUNCTURE: CPT | Performed by: INTERNAL MEDICINE

## 2025-04-23 PROCEDURE — 85027 COMPLETE CBC AUTOMATED: CPT | Performed by: INTERNAL MEDICINE

## 2025-04-23 PROCEDURE — 85610 PROTHROMBIN TIME: CPT

## 2025-04-23 PROCEDURE — 25000003 PHARM REV CODE 250: Performed by: PHYSICIAN ASSISTANT

## 2025-04-23 PROCEDURE — 25000003 PHARM REV CODE 250

## 2025-04-23 PROCEDURE — 63600175 PHARM REV CODE 636 W HCPCS: Performed by: PHYSICIAN ASSISTANT

## 2025-04-23 PROCEDURE — 11000001 HC ACUTE MED/SURG PRIVATE ROOM

## 2025-04-23 PROCEDURE — 25000003 PHARM REV CODE 250: Performed by: INTERNAL MEDICINE

## 2025-04-23 PROCEDURE — 85025 COMPLETE CBC W/AUTO DIFF WBC: CPT

## 2025-04-23 PROCEDURE — 63600175 PHARM REV CODE 636 W HCPCS: Performed by: INTERNAL MEDICINE

## 2025-04-23 RX ORDER — WARFARIN 2.5 MG/1
2.5 TABLET ORAL ONCE
Status: COMPLETED | OUTPATIENT
Start: 2025-04-23 | End: 2025-04-23

## 2025-04-23 RX ADMIN — GABAPENTIN 600 MG: 300 CAPSULE ORAL at 02:04

## 2025-04-23 RX ADMIN — HYDROMORPHONE HYDROCHLORIDE 3 MG: 2 INJECTION INTRAMUSCULAR; INTRAVENOUS; SUBCUTANEOUS at 04:04

## 2025-04-23 RX ADMIN — HYDROMORPHONE HYDROCHLORIDE 3 MG: 2 INJECTION INTRAMUSCULAR; INTRAVENOUS; SUBCUTANEOUS at 01:04

## 2025-04-23 RX ADMIN — LACTULOSE 20 G: 20 SOLUTION ORAL at 09:04

## 2025-04-23 RX ADMIN — METHOCARBAMOL 500 MG: 500 TABLET ORAL at 12:04

## 2025-04-23 RX ADMIN — SUCRALFATE 1 G: 1 TABLET ORAL at 06:04

## 2025-04-23 RX ADMIN — SUCRALFATE 1 G: 1 TABLET ORAL at 03:04

## 2025-04-23 RX ADMIN — DIPHENHYDRAMINE HYDROCHLORIDE 25 MG: 50 INJECTION, SOLUTION INTRAMUSCULAR; INTRAVENOUS at 01:04

## 2025-04-23 RX ADMIN — DIPHENHYDRAMINE HYDROCHLORIDE 25 MG: 50 INJECTION, SOLUTION INTRAMUSCULAR; INTRAVENOUS at 04:04

## 2025-04-23 RX ADMIN — HYDROMORPHONE HYDROCHLORIDE 3 MG: 2 INJECTION INTRAMUSCULAR; INTRAVENOUS; SUBCUTANEOUS at 12:04

## 2025-04-23 RX ADMIN — HYDROMORPHONE HYDROCHLORIDE 3 MG: 2 INJECTION INTRAMUSCULAR; INTRAVENOUS; SUBCUTANEOUS at 03:04

## 2025-04-23 RX ADMIN — DIPHENHYDRAMINE HYDROCHLORIDE 25 MG: 50 INJECTION, SOLUTION INTRAMUSCULAR; INTRAVENOUS at 07:04

## 2025-04-23 RX ADMIN — HYDROCODONE BITARTRATE AND ACETAMINOPHEN 1 TABLET: 10; 325 TABLET ORAL at 03:04

## 2025-04-23 RX ADMIN — HYDROCODONE BITARTRATE AND ACETAMINOPHEN 1 TABLET: 10; 325 TABLET ORAL at 05:04

## 2025-04-23 RX ADMIN — MORPHINE SULFATE 30 MG: 30 TABLET, EXTENDED RELEASE ORAL at 09:04

## 2025-04-23 RX ADMIN — HYDROCODONE BITARTRATE AND ACETAMINOPHEN 1 TABLET: 10; 325 TABLET ORAL at 09:04

## 2025-04-23 RX ADMIN — PANTOPRAZOLE SODIUM 40 MG: 40 TABLET, DELAYED RELEASE ORAL at 08:04

## 2025-04-23 RX ADMIN — HYDROMORPHONE HYDROCHLORIDE 3 MG: 2 INJECTION INTRAMUSCULAR; INTRAVENOUS; SUBCUTANEOUS at 09:04

## 2025-04-23 RX ADMIN — MORPHINE SULFATE 30 MG: 30 TABLET, EXTENDED RELEASE ORAL at 08:04

## 2025-04-23 RX ADMIN — HYDROCODONE BITARTRATE AND ACETAMINOPHEN 1 TABLET: 10; 325 TABLET ORAL at 02:04

## 2025-04-23 RX ADMIN — METHOCARBAMOL 500 MG: 500 TABLET ORAL at 05:04

## 2025-04-23 RX ADMIN — METHOCARBAMOL 500 MG: 500 TABLET ORAL at 09:04

## 2025-04-23 RX ADMIN — DIPHENHYDRAMINE HYDROCHLORIDE 25 MG: 50 INJECTION, SOLUTION INTRAMUSCULAR; INTRAVENOUS at 09:04

## 2025-04-23 RX ADMIN — DIPHENHYDRAMINE HYDROCHLORIDE 25 MG: 50 INJECTION, SOLUTION INTRAMUSCULAR; INTRAVENOUS at 12:04

## 2025-04-23 RX ADMIN — METHOCARBAMOL 500 MG: 500 TABLET ORAL at 08:04

## 2025-04-23 RX ADMIN — GABAPENTIN 600 MG: 300 CAPSULE ORAL at 08:04

## 2025-04-23 RX ADMIN — WARFARIN SODIUM 2.5 MG: 2.5 TABLET ORAL at 05:04

## 2025-04-23 RX ADMIN — PANTOPRAZOLE SODIUM 40 MG: 40 TABLET, DELAYED RELEASE ORAL at 09:04

## 2025-04-23 RX ADMIN — SUCRALFATE 1 G: 1 TABLET ORAL at 08:04

## 2025-04-23 RX ADMIN — METOCLOPRAMIDE 5 MG: 5 TABLET ORAL at 08:04

## 2025-04-23 RX ADMIN — DIPHENHYDRAMINE HYDROCHLORIDE 25 MG: 50 INJECTION, SOLUTION INTRAMUSCULAR; INTRAVENOUS at 06:04

## 2025-04-23 RX ADMIN — HYDROCODONE BITARTRATE AND ACETAMINOPHEN 1 TABLET: 10; 325 TABLET ORAL at 10:04

## 2025-04-23 RX ADMIN — SUCRALFATE 1 G: 1 TABLET ORAL at 10:04

## 2025-04-23 RX ADMIN — FOLIC ACID 1 MG: 1 TABLET ORAL at 09:04

## 2025-04-23 RX ADMIN — METOCLOPRAMIDE 5 MG: 5 TABLET ORAL at 05:04

## 2025-04-23 RX ADMIN — GABAPENTIN 600 MG: 300 CAPSULE ORAL at 09:04

## 2025-04-23 RX ADMIN — HYDROCODONE BITARTRATE AND ACETAMINOPHEN 1 TABLET: 10; 325 TABLET ORAL at 06:04

## 2025-04-23 RX ADMIN — PRAZOSIN HYDROCHLORIDE 1 MG: 1 CAPSULE ORAL at 08:04

## 2025-04-23 RX ADMIN — MORPHINE SULFATE 30 MG: 30 TABLET, EXTENDED RELEASE ORAL at 02:04

## 2025-04-23 RX ADMIN — HYDROMORPHONE HYDROCHLORIDE 3 MG: 2 INJECTION INTRAMUSCULAR; INTRAVENOUS; SUBCUTANEOUS at 06:04

## 2025-04-23 RX ADMIN — DIPHENHYDRAMINE HYDROCHLORIDE 25 MG: 50 INJECTION, SOLUTION INTRAMUSCULAR; INTRAVENOUS at 03:04

## 2025-04-23 RX ADMIN — METOCLOPRAMIDE 5 MG: 5 TABLET ORAL at 09:04

## 2025-04-23 RX ADMIN — FLUOXETINE HYDROCHLORIDE 80 MG: 20 CAPSULE ORAL at 09:04

## 2025-04-23 RX ADMIN — AMLODIPINE BESYLATE 10 MG: 10 TABLET ORAL at 09:04

## 2025-04-23 RX ADMIN — METOCLOPRAMIDE 5 MG: 5 TABLET ORAL at 12:04
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
## 2025-04-23 NOTE — CONSULTS
PHARMACY CONSULT NOTE: WARFARIN  Nazanin Malone is a 45 y.o. female on warfarin therapy for DVT/PE. PharmD has been consulted for warfarin dosing.    Current order: Warfarin 2.5 mg  Home dose: Warfarin 2.5 mg daily (adjusted 4/11 when patient's INR noted to be 7.9 after warfarin 5 mg MW (4/7, 4/9))   Coumadin clinic enrollment: Active  INR goal: 2-3    Lab Results   Component Value Date    INR 1.9 (H) 04/23/2025    INR 2.3 (H) 04/22/2025    INR 2.5 (H) 04/21/2025     Significant drug interactions: none  Diet: Adult Regular without supplements     Recommendation(s):   INR trending down, 2.5 > 2.3 > 1.9.  Will give a booster dose of Warfarin 5 mg x 1 today, then resume home dose of 2.5 mg daily on 4/24/25.  Plan discussed with team.  Pharmacy will continue to follow and monitor warfarin.    Thank you for the consult,  Dayami Knott PharmD  Extension - [phone]    **Note: Consults are reviewed Monday-Friday 7:00am-3:30pm. THE ABOVE RECOMMENDATIONS ARE ONLY SUGGESTED.THE RECOMMENDATIONS SHOULD BE CONSIDERED IN CONJUNCTION WITH ALL PATIENT FACTORS. **

## 2025-04-23 NOTE — ASSESSMENT & PLAN NOTE
Patient's most recent potassium results are listed below.   Recent Labs     04/21/25  0715   K 3.8     EKG showed normal sinus rhythm.    Plan  - Replete potassium per protocol  - Monitor potassium Daily  - Patient's hypokalemia is stable
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
## 2025-04-23 NOTE — ASSESSMENT & PLAN NOTE
Anemia is likely due to acute blood loss from hematoma, chronic blood loss from sickle cell.  Patient does have coagulopathy from chronic anticoagulation use, and did present with significant coagulopathy with INR 8.  Most recent hemoglobin and hematocrit are listed below.  Recent Labs     04/21/25  1620 04/22/25  0550 04/23/25  0558   HGB 6.8* 7.5* 8.3*   HCT 21.8* 23.6* 25.1*     Plan  - Monitor serial CBC: Daily  - Transfuse PRBC if patient becomes hemodynamically unstable, symptomatic or H/H drops below 7/21.  - Patient has received 2 units of PRBCs on 4/18and 4/22  - Patient's anemia is currently stable

## 2025-04-23 NOTE — PLAN OF CARE
ecommendations     Continue Regular diet.     Goals:   1. Maintain weight throughout admission.   2. Maintain PO intake of % throughout admission.     Nutrition Goal Status: new  Communication of RD Recs:  (POC)

## 2025-04-23 NOTE — SUBJECTIVE & OBJECTIVE
Interval History:  Seen and examined at the bedside on rounds this morning.  Significant improvement in the appearance of her right chest wall and breast, with decreased swelling, improvement in the bruising, and decreased palpable woodiness of the tissue.  Pain control remain challenging, and she has required 630 out of 630 morphine equivalents yesterday.  Discussed plan of care with the patient and her bedside nurse, including continue mobilization to encourage resolution of the hematomas, pain control as per current orders, and daily hemoglobin check to evaluate for any further signs of bleeding.    Review of Systems  Objective:     Vital Signs (Most Recent):  Temp: 99 °F (37.2 °C) (04/23/25 0428)  Pulse: 97 (04/23/25 0428)  Resp: 18 (04/23/25 0907)  BP: 113/77 (04/23/25 0428)  SpO2: 95 % (04/23/25 0428) Vital Signs (24h Range):  Temp:  [99 °F (37.2 °C)-99.5 °F (37.5 °C)] 99 °F (37.2 °C)  Pulse:  [] 97  Resp:  [18] 18  SpO2:  [93 %-97 %] 95 %  BP: (113-145)/() 113/77     Weight: 93.9 kg (207 lb 0.2 oz)  Body mass index is 37.86 kg/m².    Intake/Output Summary (Last 24 hours) at 4/23/2025 1107  Last data filed at 4/23/2025 0420  Gross per 24 hour   Intake 720 ml   Output --   Net 720 ml      Physical Exam  HENT:      Head: Normocephalic.      Nose: Nose normal. No congestion.      Mouth/Throat:      Mouth: Mucous membranes are moist.      Pharynx: Oropharynx is clear.   Eyes:      General: No scleral icterus.     Conjunctiva/sclera: Conjunctivae normal.   Cardiovascular:      Rate and Rhythm: Normal rate.   Pulmonary:      Effort: Pulmonary effort is normal.   Musculoskeletal:      Comments: Right chest wall hematoma appears significantly better on today's exam compared to priors, with decrease in the rigid appearance of the skin, diffuse bruising, and decrease woodiness.  No warmth or discharge.   Neurological:      Mental Status: She is alert.             Significant Labs:  CBC:  Recent Labs   Lab  04/21/25  1620 04/22/25  0550 04/23/25  0558   WBC 9.58 8.47 7.75   HGB 6.8* 7.5* 8.3*   HCT 21.8* 23.6* 25.1*    330 314       PTINR:  Recent Labs   Lab 04/22/25  0550 04/23/25  0558   INR 2.3* 1.9*
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
## 2025-04-23 NOTE — ASSESSMENT & PLAN NOTE
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 Had been on apixiban and then Lovenox due to insurance coverage issues.  Now on coumadin with supratherapeutic INR on admission of 8.    - s/p vit K  - pharm following for coumadin management.  - daily PT/INRs  - INR subtherapeutic on 04/23, case discussed with pharmacist and we will adjust her warfarin dosing tonight

## 2025-04-23 NOTE — PLAN OF CARE
Problem: Adult Inpatient Plan of Care  Goal: Plan of Care Review  Outcome: Progressing  Flowsheets (Taken 4/23/2025 0524)  Plan of Care Reviewed With: patient  Goal: Patient-Specific Goal (Individualized)  Outcome: Progressing  Goal: Absence of Hospital-Acquired Illness or Injury  Outcome: Progressing  Intervention: Prevent Infection  Flowsheets (Taken 4/23/2025 0524)  Infection Prevention: hand hygiene promoted  Goal: Optimal Comfort and Wellbeing  Outcome: Progressing  Intervention: Monitor Pain and Promote Comfort  Flowsheets (Taken 4/23/2025 0524)  Pain Management Interventions:   care clustered   heat applied   pain management plan reviewed with patient/caregiver   pillow support provided   quiet environment facilitated  Goal: Readiness for Transition of Care  Outcome: Progressing     Problem: Pain Acute  Goal: Optimal Pain Control and Function  Outcome: Progressing  Intervention: Develop Pain Management Plan  Flowsheets (Taken 4/23/2025 0524)  Pain Management Interventions:   care clustered   heat applied   pain management plan reviewed with patient/caregiver   pillow support provided   quiet environment facilitated  Intervention: Prevent or Manage Pain  Flowsheets (Taken 4/23/2025 0524)  Sleep/Rest Enhancement: awakenings minimized     Problem: Pain Chronic (Persistent)  Goal: Optimal Pain Control and Function  Outcome: Progressing  Intervention: Develop Pain Management Plan  Flowsheets (Taken 4/23/2025 0524)  Pain Management Interventions:   care clustered   heat applied   pain management plan reviewed with patient/caregiver   pillow support provided   quiet environment facilitated  Intervention: Manage Persistent Pain  Flowsheets (Taken 4/23/2025 0524)  Sleep/Rest Enhancement: awakenings minimized     Problem: Sickle Cell Disease  Goal: Optimal Cerebral Tissue Perfusion  Outcome: Progressing  Goal: Optimal Coping with Sickle Cell Disease  Outcome: Progressing  Goal: Effective Tissue Perfusion  Outcome:  Progressing  Goal: Absence of Infection Signs and Symptoms  Outcome: Progressing  Intervention: Prevent or Manage Infection  Flowsheets (Taken 4/23/2025 0524)  Infection Prevention: hand hygiene promoted  Goal: Optimal Pain Control and Function  Outcome: Progressing  Intervention: Manage Acute Pain  Flowsheets (Taken 4/23/2025 0524)  Sleep/Rest Enhancement: awakenings minimized  Pain Management Interventions:   care clustered   heat applied   pain management plan reviewed with patient/caregiver   pillow support provided   quiet environment facilitated  Goal: Optimal Oxygenation  Outcome: Progressing     Problem: Fall Injury Risk  Goal: Absence of Fall and Fall-Related Injury  Outcome: Progressing  Intervention: Identify and Manage Contributors  Flowsheets (Taken 4/23/2025 0524)  Self-Care Promotion: independence encouraged       Pt AAO x 4, VS stable. Pain medication given as requested; pt reported 8/10 pain this shift. Heat packs given for right breast swelling and pain. No significant events this shift. Safety maintained. Plan of care ongoing.

## 2025-04-24 LAB
ABSOLUTE EOSINOPHIL (OHS): 0.34 K/UL
ABSOLUTE MONOCYTE (OHS): 0.74 K/UL (ref 0.3–1)
ABSOLUTE NEUTROPHIL COUNT (OHS): 3.87 K/UL (ref 1.8–7.7)
BASOPHILS # BLD AUTO: 0.05 K/UL
BASOPHILS NFR BLD AUTO: 0.7 %
ERYTHROCYTE [DISTWIDTH] IN BLOOD BY AUTOMATED COUNT: 25.8 % (ref 11.5–14.5)
HCT VFR BLD AUTO: 27.1 % (ref 37–48.5)
HGB BLD-MCNC: 8.4 GM/DL (ref 12–16)
IMM GRANULOCYTES # BLD AUTO: 0.02 K/UL (ref 0–0.04)
IMM GRANULOCYTES NFR BLD AUTO: 0.3 % (ref 0–0.5)
INR PPP: 1.7 (ref 0.8–1.2)
LYMPHOCYTES # BLD AUTO: 2.58 K/UL (ref 1–4.8)
MCH RBC QN AUTO: 23.5 PG (ref 27–31)
MCHC RBC AUTO-ENTMCNC: 31 G/DL (ref 32–36)
MCV RBC AUTO: 76 FL (ref 82–98)
NUCLEATED RBC (/100WBC) (OHS): 5 /100 WBC
PLATELET # BLD AUTO: 315 K/UL (ref 150–450)
PMV BLD AUTO: 10.8 FL (ref 9.2–12.9)
PROTHROMBIN TIME: 18.1 SECONDS (ref 9–12.5)
RBC # BLD AUTO: 3.58 M/UL (ref 4–5.4)
RELATIVE EOSINOPHIL (OHS): 4.5 %
RELATIVE LYMPHOCYTE (OHS): 33.9 % (ref 18–48)
RELATIVE MONOCYTE (OHS): 9.7 % (ref 4–15)
RELATIVE NEUTROPHIL (OHS): 50.9 % (ref 38–73)
WBC # BLD AUTO: 7.6 K/UL (ref 3.9–12.7)

## 2025-04-24 PROCEDURE — 63600175 PHARM REV CODE 636 W HCPCS: Performed by: PHYSICIAN ASSISTANT

## 2025-04-24 PROCEDURE — 63600175 PHARM REV CODE 636 W HCPCS: Performed by: INTERNAL MEDICINE

## 2025-04-24 PROCEDURE — 25000003 PHARM REV CODE 250: Performed by: INTERNAL MEDICINE

## 2025-04-24 PROCEDURE — 11000001 HC ACUTE MED/SURG PRIVATE ROOM

## 2025-04-24 PROCEDURE — 25000003 PHARM REV CODE 250: Performed by: PHYSICIAN ASSISTANT

## 2025-04-24 PROCEDURE — 25000003 PHARM REV CODE 250

## 2025-04-24 PROCEDURE — 36415 COLL VENOUS BLD VENIPUNCTURE: CPT

## 2025-04-24 PROCEDURE — 85610 PROTHROMBIN TIME: CPT

## 2025-04-24 RX ORDER — WARFARIN 2.5 MG/1
2.5 TABLET ORAL ONCE
Status: COMPLETED | OUTPATIENT
Start: 2025-04-24 | End: 2025-04-24

## 2025-04-24 RX ADMIN — METOCLOPRAMIDE 5 MG: 5 TABLET ORAL at 12:04

## 2025-04-24 RX ADMIN — PRAZOSIN HYDROCHLORIDE 1 MG: 1 CAPSULE ORAL at 08:04

## 2025-04-24 RX ADMIN — METOCLOPRAMIDE 5 MG: 5 TABLET ORAL at 08:04

## 2025-04-24 RX ADMIN — METOCLOPRAMIDE 5 MG: 5 TABLET ORAL at 04:04

## 2025-04-24 RX ADMIN — HYDROCODONE BITARTRATE AND ACETAMINOPHEN 1 TABLET: 10; 325 TABLET ORAL at 05:04

## 2025-04-24 RX ADMIN — SUCRALFATE 1 G: 1 TABLET ORAL at 05:04

## 2025-04-24 RX ADMIN — WARFARIN SODIUM 2.5 MG: 2.5 TABLET ORAL at 04:04

## 2025-04-24 RX ADMIN — DIPHENHYDRAMINE HYDROCHLORIDE 25 MG: 50 INJECTION, SOLUTION INTRAMUSCULAR; INTRAVENOUS at 01:04

## 2025-04-24 RX ADMIN — MORPHINE SULFATE 30 MG: 30 TABLET, EXTENDED RELEASE ORAL at 02:04

## 2025-04-24 RX ADMIN — HYDROMORPHONE HYDROCHLORIDE 3 MG: 2 INJECTION INTRAMUSCULAR; INTRAVENOUS; SUBCUTANEOUS at 03:04

## 2025-04-24 RX ADMIN — DIPHENHYDRAMINE HYDROCHLORIDE 25 MG: 50 INJECTION, SOLUTION INTRAMUSCULAR; INTRAVENOUS at 06:04

## 2025-04-24 RX ADMIN — DIPHENHYDRAMINE HYDROCHLORIDE 25 MG: 50 INJECTION, SOLUTION INTRAMUSCULAR; INTRAVENOUS at 12:04

## 2025-04-24 RX ADMIN — METOCLOPRAMIDE 5 MG: 5 TABLET ORAL at 09:04

## 2025-04-24 RX ADMIN — HYDROCODONE BITARTRATE AND ACETAMINOPHEN 1 TABLET: 10; 325 TABLET ORAL at 02:04

## 2025-04-24 RX ADMIN — SUCRALFATE 1 G: 1 TABLET ORAL at 08:04

## 2025-04-24 RX ADMIN — METHOCARBAMOL 500 MG: 500 TABLET ORAL at 04:04

## 2025-04-24 RX ADMIN — DIPHENHYDRAMINE HYDROCHLORIDE 25 MG: 50 INJECTION, SOLUTION INTRAMUSCULAR; INTRAVENOUS at 09:04

## 2025-04-24 RX ADMIN — HYDROMORPHONE HYDROCHLORIDE 3 MG: 2 INJECTION INTRAMUSCULAR; INTRAVENOUS; SUBCUTANEOUS at 06:04

## 2025-04-24 RX ADMIN — METHOCARBAMOL 500 MG: 500 TABLET ORAL at 08:04

## 2025-04-24 RX ADMIN — HYDROMORPHONE HYDROCHLORIDE 3 MG: 2 INJECTION INTRAMUSCULAR; INTRAVENOUS; SUBCUTANEOUS at 01:04

## 2025-04-24 RX ADMIN — HYDROCODONE BITARTRATE AND ACETAMINOPHEN 1 TABLET: 10; 325 TABLET ORAL at 10:04

## 2025-04-24 RX ADMIN — METHOCARBAMOL 500 MG: 500 TABLET ORAL at 12:04

## 2025-04-24 RX ADMIN — HYDROMORPHONE HYDROCHLORIDE 3 MG: 2 INJECTION INTRAMUSCULAR; INTRAVENOUS; SUBCUTANEOUS at 08:04

## 2025-04-24 RX ADMIN — FLUOXETINE HYDROCHLORIDE 80 MG: 20 CAPSULE ORAL at 08:04

## 2025-04-24 RX ADMIN — PANTOPRAZOLE SODIUM 40 MG: 40 TABLET, DELAYED RELEASE ORAL at 08:04

## 2025-04-24 RX ADMIN — MORPHINE SULFATE 30 MG: 30 TABLET, EXTENDED RELEASE ORAL at 08:04

## 2025-04-24 RX ADMIN — LACTULOSE 20 G: 20 SOLUTION ORAL at 08:04

## 2025-04-24 RX ADMIN — GABAPENTIN 600 MG: 300 CAPSULE ORAL at 08:04

## 2025-04-24 RX ADMIN — HYDROMORPHONE HYDROCHLORIDE 3 MG: 2 INJECTION INTRAMUSCULAR; INTRAVENOUS; SUBCUTANEOUS at 12:04

## 2025-04-24 RX ADMIN — FOLIC ACID 1 MG: 1 TABLET ORAL at 08:04

## 2025-04-24 RX ADMIN — HYDROCODONE BITARTRATE AND ACETAMINOPHEN 1 TABLET: 10; 325 TABLET ORAL at 11:04

## 2025-04-24 RX ADMIN — AMLODIPINE BESYLATE 10 MG: 10 TABLET ORAL at 08:04

## 2025-04-24 RX ADMIN — DIPHENHYDRAMINE HYDROCHLORIDE 25 MG: 50 INJECTION, SOLUTION INTRAMUSCULAR; INTRAVENOUS at 08:04

## 2025-04-24 RX ADMIN — HYDROMORPHONE HYDROCHLORIDE 3 MG: 2 INJECTION INTRAMUSCULAR; INTRAVENOUS; SUBCUTANEOUS at 09:04

## 2025-04-24 RX ADMIN — SUCRALFATE 1 G: 1 TABLET ORAL at 11:04

## 2025-04-24 RX ADMIN — GABAPENTIN 600 MG: 300 CAPSULE ORAL at 02:04

## 2025-04-24 RX ADMIN — DIPHENHYDRAMINE HYDROCHLORIDE 25 MG: 50 INJECTION, SOLUTION INTRAMUSCULAR; INTRAVENOUS at 03:04

## 2025-04-24 RX ADMIN — SUCRALFATE 1 G: 1 TABLET ORAL at 04:04

## 2025-04-24 NOTE — PLAN OF CARE
Problem: Pain Acute  Goal: Optimal Pain Control and Function  Outcome: Progressing  Intervention: Develop Pain Management Plan  Flowsheets (Taken 4/24/2025 1710)  Pain Management Interventions:   around-the-clock dosing utilized   care clustered   pain management plan reviewed with patient/caregiver   quiet environment facilitated   relaxation techniques promoted   position adjusted   pillow support provided
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
## 2025-04-24 NOTE — PLAN OF CARE
[facility] - Internal Medicine Telemetry  Discharge Reassessment    Primary Care Provider: No, Primary Doctor    Expected Discharge Date: 5/1/2025    Reassessment (most recent)       Discharge Reassessment - 04/24/25 1746          Discharge Reassessment    Assessment Type Discharge Planning Reassessment (P)      Did the patient's condition or plan change since previous assessment? No (P)      Communicated ALYSE with patient/caregiver Yes (P)      Discharge Plan A Home with family;Home Health (P)      Discharge Plan B Home with family (P)      DME Needed Upon Discharge  none (P)      Transition of Care Barriers None (P)      Why the patient remains in the hospital Requires continued medical care (P)         Post-Acute Status    Hospital Resources/Appts/Education Provided Provided education on problems/symptoms using teachback (P)                  Discharge Plan A and Plan B have been determined by review of patient's clinical status, future medical and therapeutic needs, and coverage/benefits for post-acute care in coordination with multidisciplinary team members.                     ADRIEN Lehman, LMSW  [facility]  Case Management  Ext. [phone]
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
## 2025-04-24 NOTE — SUBJECTIVE & OBJECTIVE
"Interval History: Still in pian this am, chest is improving, plan on possible dc home tomorrow.    Review of Systems  Objective:     Vital Signs (Most Recent):  Temp: 98.8 °F (37.1 °C) (04/24/25 0820)  Pulse: 88 (04/24/25 0820)  Resp: 18 (04/24/25 0851)  BP: (!) 156/99 (04/24/25 0820)  SpO2: 100 % (04/24/25 0820) Vital Signs (24h Range):  Temp:  [98.5 °F (36.9 °C)-99.2 °F (37.3 °C)] 98.8 °F (37.1 °C)  Pulse:  [86-93] 88  Resp:  [18] 18  SpO2:  [92 %-100 %] 100 %  BP: (113-156)/(76-99) 156/99     Weight: 93.9 kg (207 lb 0.2 oz)  Body mass index is 37.86 kg/m².  No intake or output data in the 24 hours ending 04/24/25 0903   Physical Exam  HENT:      Head: Normocephalic.      Nose: Nose normal. No congestion.      Mouth/Throat:      Mouth: Mucous membranes are moist.      Pharynx: Oropharynx is clear.   Eyes:      General: No scleral icterus.     Conjunctiva/sclera: Conjunctivae normal.   Cardiovascular:      Rate and Rhythm: Normal rate.   Pulmonary:      Effort: Pulmonary effort is normal.   Musculoskeletal:      Comments: Right chest wall hematoma appears significantly better on today's exam compared to priors, with decrease in the rigid appearance of the skin, diffuse bruising, and decrease woodiness.  No warmth or discharge.   Neurological:      Mental Status: She is alert.         MELD 3.0: 14 at 4/23/2025  5:58 AM  MELD-Na: 14 at 4/23/2025  5:58 AM  Calculated from:  Serum Creatinine: 1 mg/dL at 4/21/2025  7:15 AM  Serum Sodium: 137 mmol/L at 4/21/2025  7:15 AM  Total Bilirubin: 0.5 mg/dL (Using min of 1 mg/dL) at 4/21/2025  7:15 AM  Serum Albumin: 3 g/dL at 4/21/2025  7:15 AM  INR(ratio): 1.9 at 4/23/2025  5:58 AM  Age at listing (hypothetical): 45 years  Sex: Female at 4/23/2025  5:58 AM      Significant Labs:  CBC:  Recent Labs   Lab 04/23/25  0558   WBC 7.75   HGB 8.3*   HCT 25.1*        CMP:  No results for input(s): "NA", "K", "CL", "CO2", "GLU", "BUN", "CREATININE", "CALCIUM", "PROT", "ALBUMIN", " ""BILITOT", "ALKPHOS", "AST", "ALT", "ANIONGAP", "EGFRNONAA" in the last 48 hours.    Invalid input(s): "ESTGFAFRICA"  PTINR:  Recent Labs   Lab 04/23/25  0558 04/24/25  0225   INR 1.9* 1.7*       Significant Procedures:   Dobutamine Stress Test with Color Flow: No results found. However, due to the size of the patient record, not all encounters were searched. Please check Results Review for a complete set of results.    None  "
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
## 2025-04-24 NOTE — ASSESSMENT & PLAN NOTE
"Patient's most recent potassium results are listed below.   No results for input(s): "K" in the last 72 hours.    EKG showed normal sinus rhythm.    Plan  - Replete potassium per protocol  - Monitor potassium Daily  - Patient's hypokalemia is stable  "

## 2025-04-24 NOTE — CONSULTS
PHARMACY CONSULT NOTE: WARFARIN  Nazanin Malone is a 45 y.o. female on warfarin therapy for DVT/PE. PharmD has been consulted for warfarin dosing.    Current order: Warfarin 2.5 mg  Home dose: Warfarin 2.5 mg daily (adjusted 4/11 when patient's INR noted to be 7.9 after warfarin 5 mg MW (4/7, 4/9))   Coumadin clinic enrollment: Active  INR goal: 2-3    Lab Results   Component Value Date    INR 1.7 (H) 04/24/2025    INR 1.9 (H) 04/23/2025    INR 2.3 (H) 04/22/2025     Significant drug interactions: none  Diet: Adult Regular without supplements      Recommendation(s):   INR continues trending down, 2.5 > 2.3 > 1.9 > 1.7.  Will give another booster dose of Warfarin 5 mg x 1 today, then resume home dose of 2.5 mg daily on 4/25/25.  Plan discussed with team.  Pharmacy will continue to follow and monitor warfarin.    Thank you for the consult,  Dayami Knott, PharmD  Extension - [phone]    **Note: Consults are reviewed Monday-Friday 7:00am-3:30pm. THE ABOVE RECOMMENDATIONS ARE ONLY SUGGESTED.THE RECOMMENDATIONS SHOULD BE CONSIDERED IN CONJUNCTION WITH ALL PATIENT FACTORS. **

## 2025-04-25 LAB
ABSOLUTE EOSINOPHIL (OHS): 0.3 K/UL
ABSOLUTE MONOCYTE (OHS): 0.86 K/UL (ref 0.3–1)
ABSOLUTE NEUTROPHIL COUNT (OHS): 2.87 K/UL (ref 1.8–7.7)
BASOPHILS # BLD AUTO: 0.03 K/UL
BASOPHILS NFR BLD AUTO: 0.5 %
ERYTHROCYTE [DISTWIDTH] IN BLOOD BY AUTOMATED COUNT: 26.1 % (ref 11.5–14.5)
HCT VFR BLD AUTO: 27.8 % (ref 37–48.5)
HGB BLD-MCNC: 8.4 GM/DL (ref 12–16)
IMM GRANULOCYTES # BLD AUTO: 0.02 K/UL (ref 0–0.04)
IMM GRANULOCYTES NFR BLD AUTO: 0.3 % (ref 0–0.5)
INR PPP: 1.9 (ref 0.8–1.2)
LYMPHOCYTES # BLD AUTO: 2.06 K/UL (ref 1–4.8)
MCH RBC QN AUTO: 23 PG (ref 27–31)
MCHC RBC AUTO-ENTMCNC: 30.2 G/DL (ref 32–36)
MCV RBC AUTO: 76 FL (ref 82–98)
NUCLEATED RBC (/100WBC) (OHS): 4 /100 WBC
PLATELET # BLD AUTO: 313 K/UL (ref 150–450)
PMV BLD AUTO: 9.6 FL (ref 9.2–12.9)
PROTHROMBIN TIME: 20.2 SECONDS (ref 9–12.5)
RBC # BLD AUTO: 3.66 M/UL (ref 4–5.4)
RELATIVE EOSINOPHIL (OHS): 4.9 %
RELATIVE LYMPHOCYTE (OHS): 33.6 % (ref 18–48)
RELATIVE MONOCYTE (OHS): 14 % (ref 4–15)
RELATIVE NEUTROPHIL (OHS): 46.7 % (ref 38–73)
WBC # BLD AUTO: 6.14 K/UL (ref 3.9–12.7)

## 2025-04-25 PROCEDURE — 85610 PROTHROMBIN TIME: CPT

## 2025-04-25 PROCEDURE — 63600175 PHARM REV CODE 636 W HCPCS: Performed by: PHYSICIAN ASSISTANT

## 2025-04-25 PROCEDURE — 25000003 PHARM REV CODE 250

## 2025-04-25 PROCEDURE — 63600175 PHARM REV CODE 636 W HCPCS: Performed by: INTERNAL MEDICINE

## 2025-04-25 PROCEDURE — 11000001 HC ACUTE MED/SURG PRIVATE ROOM

## 2025-04-25 PROCEDURE — 36415 COLL VENOUS BLD VENIPUNCTURE: CPT

## 2025-04-25 PROCEDURE — 85025 COMPLETE CBC W/AUTO DIFF WBC: CPT

## 2025-04-25 PROCEDURE — 25000003 PHARM REV CODE 250: Performed by: PHYSICIAN ASSISTANT

## 2025-04-25 PROCEDURE — 25000003 PHARM REV CODE 250: Performed by: INTERNAL MEDICINE

## 2025-04-25 RX ADMIN — METHOCARBAMOL 500 MG: 500 TABLET ORAL at 04:04

## 2025-04-25 RX ADMIN — HYDROMORPHONE HYDROCHLORIDE 3 MG: 2 INJECTION INTRAMUSCULAR; INTRAVENOUS; SUBCUTANEOUS at 07:04

## 2025-04-25 RX ADMIN — FLUOXETINE HYDROCHLORIDE 80 MG: 20 CAPSULE ORAL at 08:04

## 2025-04-25 RX ADMIN — PRAZOSIN HYDROCHLORIDE 1 MG: 1 CAPSULE ORAL at 09:04

## 2025-04-25 RX ADMIN — HYDROMORPHONE HYDROCHLORIDE 3 MG: 2 INJECTION INTRAMUSCULAR; INTRAVENOUS; SUBCUTANEOUS at 06:04

## 2025-04-25 RX ADMIN — MORPHINE SULFATE 30 MG: 30 TABLET, EXTENDED RELEASE ORAL at 04:04

## 2025-04-25 RX ADMIN — HYDROMORPHONE HYDROCHLORIDE 3 MG: 2 INJECTION INTRAMUSCULAR; INTRAVENOUS; SUBCUTANEOUS at 03:04

## 2025-04-25 RX ADMIN — SUCRALFATE 1 G: 1 TABLET ORAL at 04:04

## 2025-04-25 RX ADMIN — GABAPENTIN 600 MG: 300 CAPSULE ORAL at 08:04

## 2025-04-25 RX ADMIN — DIPHENHYDRAMINE HYDROCHLORIDE 25 MG: 50 INJECTION, SOLUTION INTRAMUSCULAR; INTRAVENOUS at 04:04

## 2025-04-25 RX ADMIN — DIPHENHYDRAMINE HYDROCHLORIDE 25 MG: 50 INJECTION, SOLUTION INTRAMUSCULAR; INTRAVENOUS at 09:04

## 2025-04-25 RX ADMIN — HYDROMORPHONE HYDROCHLORIDE 3 MG: 2 INJECTION INTRAMUSCULAR; INTRAVENOUS; SUBCUTANEOUS at 12:04

## 2025-04-25 RX ADMIN — METHOCARBAMOL 500 MG: 500 TABLET ORAL at 12:04

## 2025-04-25 RX ADMIN — SUCRALFATE 1 G: 1 TABLET ORAL at 11:04

## 2025-04-25 RX ADMIN — METHOCARBAMOL 500 MG: 500 TABLET ORAL at 08:04

## 2025-04-25 RX ADMIN — HYDROCODONE BITARTRATE AND ACETAMINOPHEN 1 TABLET: 10; 325 TABLET ORAL at 05:04

## 2025-04-25 RX ADMIN — SUCRALFATE 1 G: 1 TABLET ORAL at 06:04

## 2025-04-25 RX ADMIN — MORPHINE SULFATE 30 MG: 30 TABLET, EXTENDED RELEASE ORAL at 09:04

## 2025-04-25 RX ADMIN — FOLIC ACID 1 MG: 1 TABLET ORAL at 08:04

## 2025-04-25 RX ADMIN — GABAPENTIN 600 MG: 300 CAPSULE ORAL at 03:04

## 2025-04-25 RX ADMIN — HYDROCODONE BITARTRATE AND ACETAMINOPHEN 1 TABLET: 10; 325 TABLET ORAL at 09:04

## 2025-04-25 RX ADMIN — DIPHENHYDRAMINE HYDROCHLORIDE 25 MG: 50 INJECTION, SOLUTION INTRAMUSCULAR; INTRAVENOUS at 12:04

## 2025-04-25 RX ADMIN — DIPHENHYDRAMINE HYDROCHLORIDE 25 MG: 50 INJECTION, SOLUTION INTRAMUSCULAR; INTRAVENOUS at 07:04

## 2025-04-25 RX ADMIN — SUCRALFATE 1 G: 1 TABLET ORAL at 09:04

## 2025-04-25 RX ADMIN — METOCLOPRAMIDE 5 MG: 5 TABLET ORAL at 08:04

## 2025-04-25 RX ADMIN — WARFARIN SODIUM 2.5 MG: 2.5 TABLET ORAL at 04:04

## 2025-04-25 RX ADMIN — PANTOPRAZOLE SODIUM 40 MG: 40 TABLET, DELAYED RELEASE ORAL at 09:04

## 2025-04-25 RX ADMIN — METOCLOPRAMIDE 5 MG: 5 TABLET ORAL at 12:04

## 2025-04-25 RX ADMIN — GABAPENTIN 600 MG: 300 CAPSULE ORAL at 09:04

## 2025-04-25 RX ADMIN — METOCLOPRAMIDE 5 MG: 5 TABLET ORAL at 04:04

## 2025-04-25 RX ADMIN — DIPHENHYDRAMINE HYDROCHLORIDE 25 MG: 50 INJECTION, SOLUTION INTRAMUSCULAR; INTRAVENOUS at 06:04

## 2025-04-25 RX ADMIN — LACTULOSE 20 G: 20 SOLUTION ORAL at 08:04

## 2025-04-25 RX ADMIN — HYDROMORPHONE HYDROCHLORIDE 3 MG: 2 INJECTION INTRAMUSCULAR; INTRAVENOUS; SUBCUTANEOUS at 09:04

## 2025-04-25 RX ADMIN — HYDROMORPHONE HYDROCHLORIDE 3 MG: 2 INJECTION INTRAMUSCULAR; INTRAVENOUS; SUBCUTANEOUS at 04:04

## 2025-04-25 RX ADMIN — DIPHENHYDRAMINE HYDROCHLORIDE 25 MG: 50 INJECTION, SOLUTION INTRAMUSCULAR; INTRAVENOUS at 02:04

## 2025-04-25 RX ADMIN — HYDROCODONE BITARTRATE AND ACETAMINOPHEN 1 TABLET: 10; 325 TABLET ORAL at 03:04

## 2025-04-25 RX ADMIN — MORPHINE SULFATE 30 MG: 30 TABLET, EXTENDED RELEASE ORAL at 08:04

## 2025-04-25 RX ADMIN — METOCLOPRAMIDE 5 MG: 5 TABLET ORAL at 09:04

## 2025-04-25 RX ADMIN — METHOCARBAMOL 500 MG: 500 TABLET ORAL at 09:04

## 2025-04-25 RX ADMIN — PANTOPRAZOLE SODIUM 40 MG: 40 TABLET, DELAYED RELEASE ORAL at 08:04

## 2025-04-25 RX ADMIN — HYDROCODONE BITARTRATE AND ACETAMINOPHEN 1 TABLET: 10; 325 TABLET ORAL at 11:04

## 2025-04-25 RX ADMIN — AMLODIPINE BESYLATE 10 MG: 10 TABLET ORAL at 08:04

## 2025-04-25 RX ADMIN — DIPHENHYDRAMINE HYDROCHLORIDE 25 MG: 50 INJECTION, SOLUTION INTRAMUSCULAR; INTRAVENOUS at 10:04

## 2025-04-25 RX ADMIN — SENNOSIDES AND DOCUSATE SODIUM 1 TABLET: 50; 8.6 TABLET ORAL at 12:04

## 2025-04-25 RX ADMIN — HYDROMORPHONE HYDROCHLORIDE 3 MG: 2 INJECTION INTRAMUSCULAR; INTRAVENOUS; SUBCUTANEOUS at 10:04

## 2025-04-25 RX ADMIN — HYDROCODONE BITARTRATE AND ACETAMINOPHEN 1 TABLET: 10; 325 TABLET ORAL at 02:04
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
## 2025-04-25 NOTE — ASSESSMENT & PLAN NOTE
Anemia is likely due to acute blood loss from hematoma, chronic blood loss from sickle cell.  Patient does have coagulopathy from chronic anticoagulation use, and did present with significant coagulopathy with INR 8.  Most recent hemoglobin and hematocrit are listed below.  Recent Labs     04/23/25  0558 04/25/25  0753   HGB 8.4*  8.3* 8.4*   HCT 27.1*  25.1* 27.8*     Plan  - Monitor serial CBC: Daily  - Transfuse PRBC if patient becomes hemodynamically unstable, symptomatic or H/H drops below 7/21.  - Patient has received 2 units of PRBCs on 4/18and 4/22  - Patient's anemia is currently stable
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
## 2025-04-25 NOTE — SUBJECTIVE & OBJECTIVE
"Interval History: Still in pain, stes she will be erady for dc Monday.    Review of Systems  Objective:     Vital Signs (Most Recent):  Temp: 98.9 °F (37.2 °C) (04/25/25 0810)  Pulse: 88 (04/25/25 0810)  Resp: 18 (04/25/25 0944)  BP: (!) 139/93 (04/25/25 0810)  SpO2: 96 % (04/25/25 0810) Vital Signs (24h Range):  Temp:  [98.4 °F (36.9 °C)-99.3 °F (37.4 °C)] 98.9 °F (37.2 °C)  Pulse:  [88-96] 88  Resp:  [17-18] 18  SpO2:  [95 %-96 %] 96 %  BP: (119-139)/(79-93) 139/93     Weight: 93.9 kg (207 lb 0.2 oz)  Body mass index is 37.86 kg/m².  No intake or output data in the 24 hours ending 04/25/25 0959   Physical Exam  HENT:      Head: Normocephalic.      Nose: Nose normal. No congestion.      Mouth/Throat:      Mouth: Mucous membranes are moist.      Pharynx: Oropharynx is clear.   Eyes:      General: No scleral icterus.     Conjunctiva/sclera: Conjunctivae normal.   Cardiovascular:      Rate and Rhythm: Normal rate.   Pulmonary:      Effort: Pulmonary effort is normal.   Musculoskeletal:      Comments: Right chest wall hematoma appears significantly better on today's exam compared to priors, with decrease in the rigid appearance of the skin, diffuse bruising, and decrease woodiness.  No warmth or discharge.   Neurological:      Mental Status: She is alert.         MELD 3.0: 14 at 4/23/2025  5:58 AM  MELD-Na: 14 at 4/23/2025  5:58 AM  Calculated from:  Serum Creatinine: 1 mg/dL at 4/21/2025  7:15 AM  Serum Sodium: 137 mmol/L at 4/21/2025  7:15 AM  Total Bilirubin: 0.5 mg/dL (Using min of 1 mg/dL) at 4/21/2025  7:15 AM  Serum Albumin: 3 g/dL at 4/21/2025  7:15 AM  INR(ratio): 1.9 at 4/23/2025  5:58 AM  Age at listing (hypothetical): 45 years  Sex: Female at 4/23/2025  5:58 AM      Significant Labs:  CBC:  Recent Labs   Lab 04/25/25  0753   WBC 6.14   HGB 8.4*   HCT 27.8*        CMP:  No results for input(s): "NA", "K", "CL", "CO2", "GLU", "BUN", "CREATININE", "CALCIUM", "PROT", "ALBUMIN", "BILITOT", "ALKPHOS", " ""AST", "ALT", "ANIONGAP", "EGFRNONAA" in the last 48 hours.    Invalid input(s): "ESTGFAFR[place]"  PTINR:  Recent Labs   Lab 04/24/25  0225 04/25/25  0753   INR 1.7* 1.9*       Significant Procedures:   Dobutamine Stress Test with Color Flow: No results found. However, due to the size of the patient record, not all encounters were searched. Please check Results Review for a complete set of results.    None  "
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
## 2025-04-25 NOTE — CONSULTS
PHARMACY CONSULT NOTE: WARFARIN  Nazanin Malone is a 45 y.o. female on warfarin therapy for history of DVT/PE. PharmD has been consulted for warfarin dosing.    Current order: 2.5 mg daily  Home dose: 2.5 mg daily  Coumadin clinic enrollment: Active  INR goal: 2-3    Lab Results   Component Value Date    INR 1.9 (H) 04/25/2025    INR 1.7 (H) 04/24/2025    INR 1.9 (H) 04/23/2025     Significant drug interactions: none at this time  Diet: Adult Regular without supplements     Recommendation(s):   Patient's INR was supratherapeutic on admission d/t multiple 5 mg doses  INR now starting to trend up after two consecutive 5 mg doses - will be cautious as the INR is just below the goal of 2-3   Plan to c/w 2.5 mg daily - okay to hold off on lovenox bridge as anticipate patients INR to be therapeutic 4/25  Review of previous INR's and doses shows the dose was held 4/17 - would recommend to give a small 0.5 mg or 1 mg dose instead of holding to try to prevent big swings in the INR, if possible  It appears patient previously failed Eliquis per August 2024 note - she was on 5 mg BID and experienced a DVT   Pharmacy will continue to follow and monitor warfarin    Thank you for the consult,  Bubba Barriga, PharmD, BCPS   Ext. [phone]    **Note: Consults are reviewed Monday-Friday 7:00am-3:30pm. THE ABOVE RECOMMENDATIONS ARE ONLY SUGGESTED.THE RECOMMENDATIONS SHOULD BE CONSIDERED IN CONJUNCTION WITH ALL PATIENT FACTORS. **

## 2025-04-26 LAB
ABSOLUTE EOSINOPHIL (OHS): 0.4 K/UL
ABSOLUTE MONOCYTE (OHS): 0.9 K/UL (ref 0.3–1)
ABSOLUTE NEUTROPHIL COUNT (OHS): 2.7 K/UL (ref 1.8–7.7)
BASOPHILS # BLD AUTO: 0.07 K/UL
BASOPHILS NFR BLD AUTO: 1 %
ERYTHROCYTE [DISTWIDTH] IN BLOOD BY AUTOMATED COUNT: 25.3 % (ref 11.5–14.5)
HCT VFR BLD AUTO: 26.1 % (ref 37–48.5)
HGB BLD-MCNC: 8.4 GM/DL (ref 12–16)
IMM GRANULOCYTES # BLD AUTO: 0.02 K/UL (ref 0–0.04)
IMM GRANULOCYTES NFR BLD AUTO: 0.3 % (ref 0–0.5)
INR PPP: 2 (ref 0.8–1.2)
LYMPHOCYTES # BLD AUTO: 2.79 K/UL (ref 1–4.8)
MCH RBC QN AUTO: 23.5 PG (ref 27–31)
MCHC RBC AUTO-ENTMCNC: 32.2 G/DL (ref 32–36)
MCV RBC AUTO: 73 FL (ref 82–98)
NUCLEATED RBC (/100WBC) (OHS): 3 /100 WBC
PLATELET # BLD AUTO: 364 K/UL (ref 150–450)
PMV BLD AUTO: 9.8 FL (ref 9.2–12.9)
PROTHROMBIN TIME: 20.7 SECONDS (ref 9–12.5)
RBC # BLD AUTO: 3.58 M/UL (ref 4–5.4)
RELATIVE EOSINOPHIL (OHS): 5.8 %
RELATIVE LYMPHOCYTE (OHS): 40.6 % (ref 18–48)
RELATIVE MONOCYTE (OHS): 13.1 % (ref 4–15)
RELATIVE NEUTROPHIL (OHS): 39.2 % (ref 38–73)
WBC # BLD AUTO: 6.88 K/UL (ref 3.9–12.7)

## 2025-04-26 PROCEDURE — 63600175 PHARM REV CODE 636 W HCPCS: Performed by: PHYSICIAN ASSISTANT

## 2025-04-26 PROCEDURE — 85610 PROTHROMBIN TIME: CPT

## 2025-04-26 PROCEDURE — 25000003 PHARM REV CODE 250: Performed by: PHYSICIAN ASSISTANT

## 2025-04-26 PROCEDURE — 25000003 PHARM REV CODE 250

## 2025-04-26 PROCEDURE — 36415 COLL VENOUS BLD VENIPUNCTURE: CPT

## 2025-04-26 PROCEDURE — 63600175 PHARM REV CODE 636 W HCPCS: Performed by: INTERNAL MEDICINE

## 2025-04-26 PROCEDURE — 11000001 HC ACUTE MED/SURG PRIVATE ROOM

## 2025-04-26 PROCEDURE — 25000003 PHARM REV CODE 250: Performed by: INTERNAL MEDICINE

## 2025-04-26 PROCEDURE — 85025 COMPLETE CBC W/AUTO DIFF WBC: CPT

## 2025-04-26 RX ORDER — DIPHENHYDRAMINE HYDROCHLORIDE 50 MG/ML
50 INJECTION, SOLUTION INTRAMUSCULAR; INTRAVENOUS ONCE
Status: COMPLETED | OUTPATIENT
Start: 2025-04-26 | End: 2025-04-26

## 2025-04-26 RX ADMIN — DIPHENHYDRAMINE HYDROCHLORIDE 25 MG: 50 INJECTION, SOLUTION INTRAMUSCULAR; INTRAVENOUS at 06:04

## 2025-04-26 RX ADMIN — HYDROMORPHONE HYDROCHLORIDE 3 MG: 2 INJECTION INTRAMUSCULAR; INTRAVENOUS; SUBCUTANEOUS at 10:04

## 2025-04-26 RX ADMIN — DIPHENHYDRAMINE HYDROCHLORIDE 50 MG: 50 INJECTION, SOLUTION INTRAMUSCULAR; INTRAVENOUS at 10:04

## 2025-04-26 RX ADMIN — PANTOPRAZOLE SODIUM 40 MG: 40 TABLET, DELAYED RELEASE ORAL at 10:04

## 2025-04-26 RX ADMIN — PANTOPRAZOLE SODIUM 40 MG: 40 TABLET, DELAYED RELEASE ORAL at 08:04

## 2025-04-26 RX ADMIN — METOCLOPRAMIDE 5 MG: 5 TABLET ORAL at 08:04

## 2025-04-26 RX ADMIN — GABAPENTIN 600 MG: 300 CAPSULE ORAL at 02:04

## 2025-04-26 RX ADMIN — WARFARIN SODIUM 2.5 MG: 2.5 TABLET ORAL at 04:04

## 2025-04-26 RX ADMIN — FLUOXETINE HYDROCHLORIDE 80 MG: 20 CAPSULE ORAL at 08:04

## 2025-04-26 RX ADMIN — METHOCARBAMOL 500 MG: 500 TABLET ORAL at 08:04

## 2025-04-26 RX ADMIN — HYDROCODONE BITARTRATE AND ACETAMINOPHEN 1 TABLET: 10; 325 TABLET ORAL at 02:04

## 2025-04-26 RX ADMIN — METOCLOPRAMIDE 5 MG: 5 TABLET ORAL at 04:04

## 2025-04-26 RX ADMIN — DIPHENHYDRAMINE HYDROCHLORIDE 25 MG: 50 INJECTION, SOLUTION INTRAMUSCULAR; INTRAVENOUS at 01:04

## 2025-04-26 RX ADMIN — METHOCARBAMOL 500 MG: 500 TABLET ORAL at 04:04

## 2025-04-26 RX ADMIN — HYDROCODONE BITARTRATE AND ACETAMINOPHEN 1 TABLET: 10; 325 TABLET ORAL at 06:04

## 2025-04-26 RX ADMIN — SUCRALFATE 1 G: 1 TABLET ORAL at 10:04

## 2025-04-26 RX ADMIN — DIPHENHYDRAMINE HYDROCHLORIDE 25 MG: 50 INJECTION, SOLUTION INTRAMUSCULAR; INTRAVENOUS at 03:04

## 2025-04-26 RX ADMIN — PRAZOSIN HYDROCHLORIDE 1 MG: 1 CAPSULE ORAL at 10:04

## 2025-04-26 RX ADMIN — GABAPENTIN 600 MG: 300 CAPSULE ORAL at 08:04

## 2025-04-26 RX ADMIN — METOCLOPRAMIDE 5 MG: 5 TABLET ORAL at 01:04

## 2025-04-26 RX ADMIN — DIPHENHYDRAMINE HYDROCHLORIDE 25 MG: 50 INJECTION, SOLUTION INTRAMUSCULAR; INTRAVENOUS at 04:04

## 2025-04-26 RX ADMIN — HYDROMORPHONE HYDROCHLORIDE 3 MG: 2 INJECTION INTRAMUSCULAR; INTRAVENOUS; SUBCUTANEOUS at 04:04

## 2025-04-26 RX ADMIN — HYDROCODONE BITARTRATE AND ACETAMINOPHEN 1 TABLET: 10; 325 TABLET ORAL at 10:04

## 2025-04-26 RX ADMIN — SUCRALFATE 1 G: 1 TABLET ORAL at 06:04

## 2025-04-26 RX ADMIN — MORPHINE SULFATE 30 MG: 30 TABLET, EXTENDED RELEASE ORAL at 08:04

## 2025-04-26 RX ADMIN — DIPHENHYDRAMINE HYDROCHLORIDE 25 MG: 50 INJECTION, SOLUTION INTRAMUSCULAR; INTRAVENOUS at 10:04

## 2025-04-26 RX ADMIN — METHOCARBAMOL 500 MG: 500 TABLET ORAL at 01:04

## 2025-04-26 RX ADMIN — LACTULOSE 20 G: 20 SOLUTION ORAL at 08:04

## 2025-04-26 RX ADMIN — FOLIC ACID 1 MG: 1 TABLET ORAL at 08:04

## 2025-04-26 RX ADMIN — AMLODIPINE BESYLATE 10 MG: 10 TABLET ORAL at 08:04

## 2025-04-26 RX ADMIN — HYDROMORPHONE HYDROCHLORIDE 3 MG: 2 INJECTION INTRAMUSCULAR; INTRAVENOUS; SUBCUTANEOUS at 03:04

## 2025-04-26 RX ADMIN — MORPHINE SULFATE 30 MG: 30 TABLET, EXTENDED RELEASE ORAL at 02:04

## 2025-04-26 RX ADMIN — HYDROMORPHONE HYDROCHLORIDE 3 MG: 2 INJECTION INTRAMUSCULAR; INTRAVENOUS; SUBCUTANEOUS at 06:04

## 2025-04-26 RX ADMIN — HYDROCODONE BITARTRATE AND ACETAMINOPHEN 1 TABLET: 10; 325 TABLET ORAL at 03:04

## 2025-04-26 RX ADMIN — SUCRALFATE 1 G: 1 TABLET ORAL at 04:04

## 2025-04-26 RX ADMIN — HYDROMORPHONE HYDROCHLORIDE 3 MG: 2 INJECTION INTRAMUSCULAR; INTRAVENOUS; SUBCUTANEOUS at 01:04
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
## 2025-04-26 NOTE — SUBJECTIVE & OBJECTIVE
Interval History: Looks well, dc to home tomorrow.    Review of Systems  Objective:     Vital Signs (Most Recent):  Temp: 98.4 °F (36.9 °C) (04/26/25 0730)  Pulse: 86 (04/26/25 0730)  Resp: 18 (04/26/25 0824)  BP: 124/82 (04/26/25 0730)  SpO2: 97 % (04/26/25 0730) Vital Signs (24h Range):  Temp:  [98.4 °F (36.9 °C)-99.1 °F (37.3 °C)] 98.4 °F (36.9 °C)  Pulse:  [86-96] 86  Resp:  [16-19] 18  SpO2:  [92 %-97 %] 97 %  BP: (108-133)/(63-83) 124/82     Weight: 93.9 kg (207 lb 0.2 oz)  Body mass index is 37.86 kg/m².    Intake/Output Summary (Last 24 hours) at 4/26/2025 0833  Last data filed at 4/25/2025 1847  Gross per 24 hour   Intake 240 ml   Output --   Net 240 ml      Physical Exam  HENT:      Head: Normocephalic.      Nose: Nose normal. No congestion.      Mouth/Throat:      Mouth: Mucous membranes are moist.      Pharynx: Oropharynx is clear.   Eyes:      General: No scleral icterus.     Conjunctiva/sclera: Conjunctivae normal.   Cardiovascular:      Rate and Rhythm: Normal rate.   Pulmonary:      Effort: Pulmonary effort is normal.   Musculoskeletal:      Comments: Right chest wall hematoma appears significantly better on today's exam compared to priors, with decrease in the rigid appearance of the skin, diffuse bruising, and decrease woodiness.  No warmth or discharge.   Neurological:      Mental Status: She is alert.         MELD 3.0: 14 at 4/23/2025  5:58 AM  MELD-Na: 14 at 4/23/2025  5:58 AM  Calculated from:  Serum Creatinine: 1 mg/dL at 4/21/2025  7:15 AM  Serum Sodium: 137 mmol/L at 4/21/2025  7:15 AM  Total Bilirubin: 0.5 mg/dL (Using min of 1 mg/dL) at 4/21/2025  7:15 AM  Serum Albumin: 3 g/dL at 4/21/2025  7:15 AM  INR(ratio): 1.9 at 4/23/2025  5:58 AM  Age at listing (hypothetical): 45 years  Sex: Female at 4/23/2025  5:58 AM      Significant Labs:  CBC:  Recent Labs   Lab 04/25/25  0753 04/26/25  0425   WBC 6.14 6.88   HGB 8.4* 8.4*   HCT 27.8* 26.1*    364     CMP:  No results for input(s):  ""NA", "K", "CL", "CO2", "GLU", "BUN", "CREATININE", "CALCIUM", "PROT", "ALBUMIN", "BILITOT", "ALKPHOS", "AST", "ALT", "ANIONGAP", "EGFRNONAA" in the last 48 hours.    Invalid input(s): "ESTGFAFR[place]"  PTINR:  Recent Labs   Lab 04/25/25  0753 04/26/25  0425   INR 1.9* 2.0*       Significant Procedures:   Dobutamine Stress Test with Color Flow: No results found. However, due to the size of the patient record, not all encounters were searched. Please check Results Review for a complete set of results.    None  "
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
## 2025-04-26 NOTE — PLAN OF CARE
Problem: Adult Inpatient Plan of Care  Goal: Plan of Care Review  Outcome: Ongoing  Goal: Patient-Specific Goal (Individualized)  Outcome: Ongoing  Goal: Absence of Hospital-Acquired Illness or Injury  Outcome: Ongoing  Goal: Optimal Comfort and Wellbeing  Outcome: Ongoing  Goal: Readiness for Transition of Care  Outcome: Ongoing     Problem: Pain Acute  Goal: Optimal Pain Control and Function  Outcome: Ongoing     Problem: Sickle Cell Disease  Goal: Optimal Cerebral Tissue Perfusion  Outcome: Ongoing  Goal: Optimal Coping with Sickle Cell Disease  Outcome: Ongoing  Goal: Effective Tissue Perfusion  Outcome: Ongoing  Goal: Absence of Infection Signs and Symptoms  Outcome: Ongoing  Goal: Optimal Pain Control and Function  Outcome: Ongoing  Goal: Optimal Oxygenation  Outcome: Ongoing

## 2025-04-26 NOTE — ASSESSMENT & PLAN NOTE
Anemia is likely due to acute blood loss from hematoma, chronic blood loss from sickle cell.  Patient does have coagulopathy from chronic anticoagulation use, and did present with significant coagulopathy with INR 8.  Most recent hemoglobin and hematocrit are listed below.  Recent Labs     04/25/25  0753 04/26/25  0425   HGB 8.4* 8.4*   HCT 27.8* 26.1*     Plan  - Monitor serial CBC: Daily  - Transfuse PRBC if patient becomes hemodynamically unstable, symptomatic or H/H drops below 7/21.  - Patient has received 2 units of PRBCs on 4/18and 4/22  - Patient's anemia is currently stable

## 2025-04-27 LAB
ABSOLUTE EOSINOPHIL (OHS): 0.35 K/UL
ABSOLUTE MONOCYTE (OHS): 0.78 K/UL (ref 0.3–1)
ABSOLUTE NEUTROPHIL COUNT (OHS): 2.38 K/UL (ref 1.8–7.7)
BASOPHILS # BLD AUTO: 0.05 K/UL
BASOPHILS NFR BLD AUTO: 0.9 %
ERYTHROCYTE [DISTWIDTH] IN BLOOD BY AUTOMATED COUNT: 25.1 % (ref 11.5–14.5)
HCT VFR BLD AUTO: 26.3 % (ref 37–48.5)
HGB BLD-MCNC: 8.3 GM/DL (ref 12–16)
IMM GRANULOCYTES # BLD AUTO: 0.01 K/UL (ref 0–0.04)
IMM GRANULOCYTES NFR BLD AUTO: 0.2 % (ref 0–0.5)
INR PPP: 2.1 (ref 0.8–1.2)
LYMPHOCYTES # BLD AUTO: 2.15 K/UL (ref 1–4.8)
MCH RBC QN AUTO: 22.8 PG (ref 27–31)
MCHC RBC AUTO-ENTMCNC: 31.6 G/DL (ref 32–36)
MCV RBC AUTO: 72 FL (ref 82–98)
NUCLEATED RBC (/100WBC) (OHS): 4 /100 WBC
PLATELET # BLD AUTO: 379 K/UL (ref 150–450)
PMV BLD AUTO: 9.1 FL (ref 9.2–12.9)
PROTHROMBIN TIME: 22.2 SECONDS (ref 9–12.5)
RBC # BLD AUTO: 3.64 M/UL (ref 4–5.4)
RELATIVE EOSINOPHIL (OHS): 6.1 %
RELATIVE LYMPHOCYTE (OHS): 37.6 % (ref 18–48)
RELATIVE MONOCYTE (OHS): 13.6 % (ref 4–15)
RELATIVE NEUTROPHIL (OHS): 41.6 % (ref 38–73)
WBC # BLD AUTO: 5.72 K/UL (ref 3.9–12.7)

## 2025-04-27 PROCEDURE — 11000001 HC ACUTE MED/SURG PRIVATE ROOM

## 2025-04-27 PROCEDURE — 85025 COMPLETE CBC W/AUTO DIFF WBC: CPT

## 2025-04-27 PROCEDURE — 25000003 PHARM REV CODE 250: Performed by: INTERNAL MEDICINE

## 2025-04-27 PROCEDURE — 63600175 PHARM REV CODE 636 W HCPCS: Performed by: INTERNAL MEDICINE

## 2025-04-27 PROCEDURE — 85610 PROTHROMBIN TIME: CPT

## 2025-04-27 PROCEDURE — 25000003 PHARM REV CODE 250

## 2025-04-27 PROCEDURE — 25000003 PHARM REV CODE 250: Performed by: PHYSICIAN ASSISTANT

## 2025-04-27 PROCEDURE — 36415 COLL VENOUS BLD VENIPUNCTURE: CPT

## 2025-04-27 PROCEDURE — 63600175 PHARM REV CODE 636 W HCPCS: Performed by: PHYSICIAN ASSISTANT

## 2025-04-27 RX ORDER — DIPHENHYDRAMINE HYDROCHLORIDE 50 MG/ML
25 INJECTION, SOLUTION INTRAMUSCULAR; INTRAVENOUS ONCE
Status: COMPLETED | OUTPATIENT
Start: 2025-04-27 | End: 2025-04-27

## 2025-04-27 RX ADMIN — AMLODIPINE BESYLATE 10 MG: 10 TABLET ORAL at 08:04

## 2025-04-27 RX ADMIN — METOCLOPRAMIDE 5 MG: 5 TABLET ORAL at 08:04

## 2025-04-27 RX ADMIN — SUCRALFATE 1 G: 1 TABLET ORAL at 08:04

## 2025-04-27 RX ADMIN — METHOCARBAMOL 500 MG: 500 TABLET ORAL at 08:04

## 2025-04-27 RX ADMIN — MORPHINE SULFATE 30 MG: 30 TABLET, EXTENDED RELEASE ORAL at 02:04

## 2025-04-27 RX ADMIN — MORPHINE SULFATE 30 MG: 30 TABLET, EXTENDED RELEASE ORAL at 08:04

## 2025-04-27 RX ADMIN — HYDROMORPHONE HYDROCHLORIDE 3 MG: 2 INJECTION INTRAMUSCULAR; INTRAVENOUS; SUBCUTANEOUS at 05:04

## 2025-04-27 RX ADMIN — GABAPENTIN 600 MG: 300 CAPSULE ORAL at 02:04

## 2025-04-27 RX ADMIN — HYDROCODONE BITARTRATE AND ACETAMINOPHEN 1 TABLET: 10; 325 TABLET ORAL at 02:04

## 2025-04-27 RX ADMIN — DIPHENHYDRAMINE HYDROCHLORIDE 25 MG: 50 INJECTION, SOLUTION INTRAMUSCULAR; INTRAVENOUS at 02:04

## 2025-04-27 RX ADMIN — PANTOPRAZOLE SODIUM 40 MG: 40 TABLET, DELAYED RELEASE ORAL at 08:04

## 2025-04-27 RX ADMIN — FOLIC ACID 1 MG: 1 TABLET ORAL at 08:04

## 2025-04-27 RX ADMIN — WARFARIN SODIUM 2.5 MG: 2.5 TABLET ORAL at 05:04

## 2025-04-27 RX ADMIN — SUCRALFATE 1 G: 1 TABLET ORAL at 10:04

## 2025-04-27 RX ADMIN — HYDROMORPHONE HYDROCHLORIDE 3 MG: 2 INJECTION INTRAMUSCULAR; INTRAVENOUS; SUBCUTANEOUS at 06:04

## 2025-04-27 RX ADMIN — HYDROCODONE BITARTRATE AND ACETAMINOPHEN 1 TABLET: 10; 325 TABLET ORAL at 10:04

## 2025-04-27 RX ADMIN — LACTULOSE 20 G: 20 SOLUTION ORAL at 08:04

## 2025-04-27 RX ADMIN — METHOCARBAMOL 500 MG: 500 TABLET ORAL at 02:04

## 2025-04-27 RX ADMIN — HYDROMORPHONE HYDROCHLORIDE 3 MG: 2 INJECTION INTRAMUSCULAR; INTRAVENOUS; SUBCUTANEOUS at 11:04

## 2025-04-27 RX ADMIN — SUCRALFATE 1 G: 1 TABLET ORAL at 06:04

## 2025-04-27 RX ADMIN — FLUOXETINE HYDROCHLORIDE 80 MG: 20 CAPSULE ORAL at 08:04

## 2025-04-27 RX ADMIN — HYDROCODONE BITARTRATE AND ACETAMINOPHEN 1 TABLET: 10; 325 TABLET ORAL at 06:04

## 2025-04-27 RX ADMIN — METHOCARBAMOL 500 MG: 500 TABLET ORAL at 05:04

## 2025-04-27 RX ADMIN — GABAPENTIN 600 MG: 300 CAPSULE ORAL at 08:04

## 2025-04-27 RX ADMIN — METOCLOPRAMIDE 5 MG: 5 TABLET ORAL at 02:04

## 2025-04-27 RX ADMIN — DIPHENHYDRAMINE HYDROCHLORIDE 25 MG: 50 INJECTION, SOLUTION INTRAMUSCULAR; INTRAVENOUS at 03:04

## 2025-04-27 RX ADMIN — HYDROMORPHONE HYDROCHLORIDE 3 MG: 2 INJECTION INTRAMUSCULAR; INTRAVENOUS; SUBCUTANEOUS at 03:04

## 2025-04-27 RX ADMIN — SUCRALFATE 1 G: 1 TABLET ORAL at 05:04

## 2025-04-27 RX ADMIN — HYDROMORPHONE HYDROCHLORIDE 3 MG: 2 INJECTION INTRAMUSCULAR; INTRAVENOUS; SUBCUTANEOUS at 08:04

## 2025-04-27 RX ADMIN — HYDROMORPHONE HYDROCHLORIDE 3 MG: 2 INJECTION INTRAMUSCULAR; INTRAVENOUS; SUBCUTANEOUS at 09:04

## 2025-04-27 RX ADMIN — DIPHENHYDRAMINE HYDROCHLORIDE 25 MG: 50 INJECTION, SOLUTION INTRAMUSCULAR; INTRAVENOUS at 11:04

## 2025-04-27 RX ADMIN — HYDROMORPHONE HYDROCHLORIDE 3 MG: 2 INJECTION INTRAMUSCULAR; INTRAVENOUS; SUBCUTANEOUS at 02:04

## 2025-04-27 RX ADMIN — DIPHENHYDRAMINE HYDROCHLORIDE 25 MG: 50 INJECTION, SOLUTION INTRAMUSCULAR; INTRAVENOUS at 05:04

## 2025-04-27 RX ADMIN — DIPHENHYDRAMINE HYDROCHLORIDE 25 MG: 50 INJECTION, SOLUTION INTRAMUSCULAR; INTRAVENOUS at 08:04

## 2025-04-27 RX ADMIN — DIPHENHYDRAMINE HYDROCHLORIDE 25 MG: 50 INJECTION, SOLUTION INTRAMUSCULAR; INTRAVENOUS at 09:04

## 2025-04-27 RX ADMIN — DIPHENHYDRAMINE HYDROCHLORIDE 25 MG: 50 INJECTION, SOLUTION INTRAMUSCULAR; INTRAVENOUS at 06:04

## 2025-04-27 RX ADMIN — METOCLOPRAMIDE 5 MG: 5 TABLET ORAL at 05:04

## 2025-04-27 RX ADMIN — PRAZOSIN HYDROCHLORIDE 1 MG: 1 CAPSULE ORAL at 08:04

## 2025-04-27 NOTE — PLAN OF CARE
Problem: Adult Inpatient Plan of Care  Goal: Plan of Care Review  Outcome: Ongoing  Goal: Patient-Specific Goal (Individualized)  Outcome: Ongoing  Goal: Absence of Hospital-Acquired Illness or Injury  Outcome: Ongoing  Goal: Optimal Comfort and Wellbeing  Outcome: Ongoing  Goal: Readiness for Transition of Care  Outcome: Ongoing     Problem: Pain Acute  Goal: Optimal Pain Control and Function  Outcome: Ongoing
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
## 2025-04-27 NOTE — ASSESSMENT & PLAN NOTE
Anemia is likely due to acute blood loss from hematoma, chronic blood loss from sickle cell.  Patient does have coagulopathy from chronic anticoagulation use, and did present with significant coagulopathy with INR 8.  Most recent hemoglobin and hematocrit are listed below.  Recent Labs     04/25/25  0753 04/26/25  0425 04/27/25  0453   HGB 8.4* 8.4* 8.3*   HCT 27.8* 26.1* 26.3*     Plan  - Monitor serial CBC: Daily  - Transfuse PRBC if patient becomes hemodynamically unstable, symptomatic or H/H drops below 7/21.  - Patient has received 2 units of PRBCs on 4/18and 4/22  - Patient's anemia is currently stable
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
## 2025-04-27 NOTE — SUBJECTIVE & OBJECTIVE
Interval History: Plan on dc to home. Still with breast pain.    Review of Systems  Objective:     Vital Signs (Most Recent):  Temp: 98.6 °F (37 °C) (04/27/25 0751)  Pulse: 83 (04/27/25 0752)  Resp: 18 (04/27/25 0836)  BP: 130/83 (04/27/25 0752)  SpO2: 97 % (04/27/25 0752) Vital Signs (24h Range):  Temp:  [98.5 °F (36.9 °C)-99 °F (37.2 °C)] 98.6 °F (37 °C)  Pulse:  [82-99] 83  Resp:  [16-19] 18  SpO2:  [94 %-100 %] 97 %  BP: (118-158)/(66-89) 130/83     Weight: 93.9 kg (207 lb 0.2 oz)  Body mass index is 37.86 kg/m².    Intake/Output Summary (Last 24 hours) at 4/27/2025 0948  Last data filed at 4/26/2025 1905  Gross per 24 hour   Intake 240 ml   Output --   Net 240 ml      Physical Exam  HENT:      Head: Normocephalic.      Nose: Nose normal. No congestion.      Mouth/Throat:      Mouth: Mucous membranes are moist.      Pharynx: Oropharynx is clear.   Eyes:      General: No scleral icterus.     Conjunctiva/sclera: Conjunctivae normal.   Cardiovascular:      Rate and Rhythm: Normal rate.   Pulmonary:      Effort: Pulmonary effort is normal.   Musculoskeletal:      Comments: Right chest wall hematoma appears significantly better on today's exam compared to priors, with decrease in the rigid appearance of the skin, diffuse bruising, and decrease woodiness.  No warmth or discharge.   Neurological:      Mental Status: She is alert.         MELD 3.0: 14 at 4/23/2025  5:58 AM  MELD-Na: 14 at 4/23/2025  5:58 AM  Calculated from:  Serum Creatinine: 1 mg/dL at 4/21/2025  7:15 AM  Serum Sodium: 137 mmol/L at 4/21/2025  7:15 AM  Total Bilirubin: 0.5 mg/dL (Using min of 1 mg/dL) at 4/21/2025  7:15 AM  Serum Albumin: 3 g/dL at 4/21/2025  7:15 AM  INR(ratio): 1.9 at 4/23/2025  5:58 AM  Age at listing (hypothetical): 45 years  Sex: Female at 4/23/2025  5:58 AM      Significant Labs:  CBC:  Recent Labs   Lab 04/26/25  0425 04/27/25  0453   WBC 6.88 5.72   HGB 8.4* 8.3*   HCT 26.1* 26.3*    379     CMP:  No results for  "input(s): "NA", "K", "CL", "CO2", "GLU", "BUN", "CREATININE", "CALCIUM", "PROT", "ALBUMIN", "BILITOT", "ALKPHOS", "AST", "ALT", "ANIONGAP", "EGFRNONAA" in the last 48 hours.    Invalid input(s): "ESTGFAFRICA"  PTINR:  Recent Labs   Lab 04/26/25  0425 04/27/25  0453   INR 2.0* 2.1*       Significant Procedures:   Dobutamine Stress Test with Color Flow: No results found. However, due to the size of the patient record, not all encounters were searched. Please check Results Review for a complete set of results.    None  "

## 2025-04-28 VITALS
TEMPERATURE: 99 F | DIASTOLIC BLOOD PRESSURE: 83 MMHG | BODY MASS INDEX: 38.09 KG/M2 | RESPIRATION RATE: 17 BRPM | HEART RATE: 89 BPM | OXYGEN SATURATION: 97 % | WEIGHT: 207 LBS | SYSTOLIC BLOOD PRESSURE: 142 MMHG | HEIGHT: 62 IN

## 2025-04-28 LAB
INR PPP: 2.1 (ref 0.8–1.2)
POCT GLUCOSE: 116 MG/DL (ref 70–110)
POCT GLUCOSE: 71 MG/DL (ref 70–110)
PROTHROMBIN TIME: 21.7 SECONDS (ref 9–12.5)

## 2025-04-28 PROCEDURE — 25000003 PHARM REV CODE 250

## 2025-04-28 PROCEDURE — 36415 COLL VENOUS BLD VENIPUNCTURE: CPT

## 2025-04-28 PROCEDURE — 25000003 PHARM REV CODE 250: Performed by: PHYSICIAN ASSISTANT

## 2025-04-28 PROCEDURE — 63600175 PHARM REV CODE 636 W HCPCS: Performed by: PHYSICIAN ASSISTANT

## 2025-04-28 PROCEDURE — 63600175 PHARM REV CODE 636 W HCPCS: Performed by: INTERNAL MEDICINE

## 2025-04-28 PROCEDURE — 85610 PROTHROMBIN TIME: CPT

## 2025-04-28 RX ORDER — GABAPENTIN 300 MG/1
600-900 CAPSULE ORAL 3 TIMES DAILY
Qty: 270 CAPSULE | Refills: 11 | Status: ON HOLD | OUTPATIENT
Start: 2025-04-28 | End: 2026-04-28

## 2025-04-28 RX ORDER — PANTOPRAZOLE SODIUM 40 MG/1
40 TABLET, DELAYED RELEASE ORAL DAILY
Qty: 90 TABLET | Refills: 3 | Status: ON HOLD | OUTPATIENT
Start: 2025-04-28 | End: 2026-04-28

## 2025-04-28 RX ORDER — TIZANIDINE 4 MG/1
4 TABLET ORAL EVERY 8 HOURS PRN
Qty: 30 TABLET | Refills: 0 | Status: ON HOLD | OUTPATIENT
Start: 2025-04-28 | End: 2025-05-08

## 2025-04-28 RX ORDER — MORPHINE SULFATE 30 MG/1
30 TABLET, FILM COATED, EXTENDED RELEASE ORAL 2 TIMES DAILY
Qty: 14 TABLET | Refills: 0 | Status: ON HOLD | OUTPATIENT
Start: 2025-04-28

## 2025-04-28 RX ORDER — DIPHENHYDRAMINE HYDROCHLORIDE 50 MG/ML
50 INJECTION, SOLUTION INTRAMUSCULAR; INTRAVENOUS ONCE
Status: COMPLETED | OUTPATIENT
Start: 2025-04-28 | End: 2025-04-28

## 2025-04-28 RX ORDER — HYDROMORPHONE HYDROCHLORIDE 2 MG/ML
2 INJECTION, SOLUTION INTRAMUSCULAR; INTRAVENOUS; SUBCUTANEOUS ONCE
Status: COMPLETED | OUTPATIENT
Start: 2025-04-28 | End: 2025-04-28

## 2025-04-28 RX ORDER — HEPARIN 100 UNIT/ML
10 SYRINGE INTRAVENOUS
Status: DISCONTINUED | OUTPATIENT
Start: 2025-04-28 | End: 2025-04-28 | Stop reason: HOSPADM

## 2025-04-28 RX ADMIN — DIPHENHYDRAMINE HYDROCHLORIDE 25 MG: 50 INJECTION, SOLUTION INTRAMUSCULAR; INTRAVENOUS at 12:04

## 2025-04-28 RX ADMIN — METHOCARBAMOL 500 MG: 500 TABLET ORAL at 09:04

## 2025-04-28 RX ADMIN — FOLIC ACID 1 MG: 1 TABLET ORAL at 09:04

## 2025-04-28 RX ADMIN — DIPHENHYDRAMINE HYDROCHLORIDE 25 MG: 50 INJECTION, SOLUTION INTRAMUSCULAR; INTRAVENOUS at 07:04

## 2025-04-28 RX ADMIN — MORPHINE SULFATE 30 MG: 30 TABLET, EXTENDED RELEASE ORAL at 09:04

## 2025-04-28 RX ADMIN — FLUOXETINE HYDROCHLORIDE 80 MG: 20 CAPSULE ORAL at 09:04

## 2025-04-28 RX ADMIN — METOCLOPRAMIDE 5 MG: 5 TABLET ORAL at 09:04

## 2025-04-28 RX ADMIN — GABAPENTIN 600 MG: 300 CAPSULE ORAL at 09:04

## 2025-04-28 RX ADMIN — HYDROCODONE BITARTRATE AND ACETAMINOPHEN 1 TABLET: 10; 325 TABLET ORAL at 06:04

## 2025-04-28 RX ADMIN — HYDROMORPHONE HYDROCHLORIDE 3 MG: 2 INJECTION INTRAMUSCULAR; INTRAVENOUS; SUBCUTANEOUS at 12:04

## 2025-04-28 RX ADMIN — DIPHENHYDRAMINE HYDROCHLORIDE 50 MG: 50 INJECTION, SOLUTION INTRAMUSCULAR; INTRAVENOUS at 11:04

## 2025-04-28 RX ADMIN — SUCRALFATE 1 G: 1 TABLET ORAL at 09:04

## 2025-04-28 RX ADMIN — DIPHENHYDRAMINE HYDROCHLORIDE 25 MG: 50 INJECTION, SOLUTION INTRAMUSCULAR; INTRAVENOUS at 04:04

## 2025-04-28 RX ADMIN — HYDROMORPHONE HYDROCHLORIDE 3 MG: 2 INJECTION INTRAMUSCULAR; INTRAVENOUS; SUBCUTANEOUS at 07:04

## 2025-04-28 RX ADMIN — SUCRALFATE 1 G: 1 TABLET ORAL at 06:04

## 2025-04-28 RX ADMIN — LACTULOSE 20 G: 20 SOLUTION ORAL at 09:04

## 2025-04-28 RX ADMIN — HEPARIN 10 UNITS: 100 SYRINGE at 01:04

## 2025-04-28 RX ADMIN — HYDROMORPHONE HYDROCHLORIDE 3 MG: 2 INJECTION INTRAMUSCULAR; INTRAVENOUS; SUBCUTANEOUS at 11:04

## 2025-04-28 RX ADMIN — HYDROCODONE BITARTRATE AND ACETAMINOPHEN 1 TABLET: 10; 325 TABLET ORAL at 02:04

## 2025-04-28 RX ADMIN — HYDROMORPHONE HYDROCHLORIDE 2 MG: 2 INJECTION INTRAMUSCULAR; INTRAVENOUS; SUBCUTANEOUS at 01:04

## 2025-04-28 RX ADMIN — PANTOPRAZOLE SODIUM 40 MG: 40 TABLET, DELAYED RELEASE ORAL at 09:04

## 2025-04-28 RX ADMIN — DIPHENHYDRAMINE HYDROCHLORIDE 25 MG: 50 INJECTION, SOLUTION INTRAMUSCULAR; INTRAVENOUS at 01:04

## 2025-04-28 RX ADMIN — AMLODIPINE BESYLATE 10 MG: 10 TABLET ORAL at 09:04

## 2025-04-28 RX ADMIN — HYDROCODONE BITARTRATE AND ACETAMINOPHEN 1 TABLET: 10; 325 TABLET ORAL at 09:04

## 2025-04-28 RX ADMIN — HYDROMORPHONE HYDROCHLORIDE 3 MG: 2 INJECTION INTRAMUSCULAR; INTRAVENOUS; SUBCUTANEOUS at 04:04

## 2025-04-28 NOTE — PLAN OF CARE
DARRION ordered Lyft ride for patient transport to Home. Ride ETA  4:40pm.              ADRIEN Lehman, LMSW  [facility]  Case Management  Ext. [phone]

## 2025-04-28 NOTE — PLAN OF CARE
[facility] - Internal Medicine Telemetry  Discharge Reassessment    Primary Care Provider: No, Primary Doctor    Expected Discharge Date: 4/28/2025    Reassessment (most recent)       Discharge Reassessment - 04/28/25 1529          Discharge Reassessment    Assessment Type Discharge Planning Reassessment (P)      Did the patient's condition or plan change since previous assessment? No (P)      Discharge Plan discussed with: Patient (P)      Communicated ALYSE with patient/caregiver Yes (P)      Discharge Plan A Home with family;Home Health (P)      Discharge Plan B Home with family (P)      DME Needed Upon Discharge  none (P)      Transition of Care Barriers None (P)         Post-Acute Status    Post-Acute Authorization Home Health (P)      Home Health Status Set-up Complete/Auth obtained (P)      Hospital Resources/Appts/Education Provided Provided education on problems/symptoms using teachback (P)                  Discharge Plan A and Plan B have been determined by review of patient's clinical status, future medical and therapeutic needs, and coverage/benefits for post-acute care in coordination with multidisciplinary team members.       DARRION confirmed plan of Home w/ HH today w/ patient. DARRION sent HH orders to [facility] via [employer] and notified of patient's dc today. SW to order lyft ride to home once medically cleared.                    ADRIEN Lehman, SW  [facility]  Case Management  Ext. [phone]

## 2025-04-28 NOTE — CONSULTS
PHARMACY CONSULT NOTE: WARFARIN  Nazanin Malone is a 45 y.o. female on warfarin therapy for DVT/PE. PharmD has been consulted for warfarin dosing.    Current order: 2.5 mg daily  Home dose: 2.5 mg daily  Coumadin clinic enrollment: Active  INR goal: 2-3    Lab Results   Component Value Date    INR 2.1 (H) 04/28/2025    INR 2.1 (H) 04/27/2025    INR 2.0 (H) 04/26/2025     Significant drug interactions: no major interactions noted  Diet: Adult Regular without supplements     Recommendation(s):   INR therapeutic  Continue warfarin 2.5mg daily  Daily PT/INR while admitted   Pharmacy will continue to follow and monitor warfarin    Thank you for the consult,  Bubba Loredo, PharmD  Ext: [phone]    **Note: Consults are reviewed Monday-Friday 7:00am-3:30pm. THE ABOVE RECOMMENDATIONS ARE ONLY SUGGESTED.THE RECOMMENDATIONS SHOULD BE CONSIDERED IN CONJUNCTION WITH ALL PATIENT FACTORS. **

## 2025-04-28 NOTE — NURSING
[place] accessed. Patient awaiting transport and lyft for dc. Primary nurse and  made aware of needing lyft.
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
## 2025-04-28 NOTE — PLAN OF CARE
Important Message from Medicare  Important Message from Medicare regarding Discharge Appeal Rights: Signed/date by patient/caregiver, Given to patient/caregiver, Explained to patient/caregiver     Date IMM was signed: 04/28/25  Time IMM was signed: 6485               ADRIEN Lehman, LMSW  [facility]  Case Management  Ext. [phone]

## 2025-04-28 NOTE — PLAN OF CARE
Has The Growth Been Previously Biopsied?: has been previously biopsied [facility] Tele[facility]  Discharge Final Note    Primary Care Provider: No, Primary Doctor    Expected Discharge Date: 4/28/2025    Patient discharged to home w/ [facility] services.    Patient discharged via lyft transportation.     Patient's bedside nurse and MD notified of the above.    Discharge Plan A and Plan B have been determined by review of patient's clinical status, future medical and therapeutic needs, and coverage/benefits for post-acute care in coordination with multidisciplinary team members.        Final Discharge Note (most recent)       Final Note - 04/28/25 1633          Final Note    Assessment Type Final Discharge Note (P)      Anticipated Discharge Disposition Home-Health Care Svc (P)      What phone number can be called within the next 1-3 days to see how you are doing after discharge? [phone] (P)      Hospital Resources/Appts/Education Provided Provided education on problems/symptoms using teachback (P)         Post-Acute Status    Post-Acute Authorization Home Health (P)      Home Health Status Set-up Complete/Auth obtained (P)                      Important Message from Medicare  Important Message from Medicare regarding Discharge Appeal Rights: Signed/date by patient/caregiver, Given to patient/caregiver, Explained to patient/caregiver     Date IMM was signed: 04/28/25  Time IMM was signed: 1229        Future Appointments   Date Time Provider Department Center   5/20/2025  1:20 PM [facility] BMT [facility] Dong Dominguez   5/20/2025  2:20 PM Vijaya Soni MD [facility] DEE Francisco MSW, LMSW  [facility]  Case Management  Ext. [phone]

## 2025-04-28 NOTE — NURSING
Patient reported being jittery. Accuchecks 71. No changes in mentation. Given ines crackers and juices. New set of vitals taken.

## 2025-04-28 NOTE — DISCHARGE INSTRUCTIONS
Our goal at [facility] is to always give you outstanding care and exceptional service. You may receive a survey from [employer] by mail, text or e-mail in the next 24-48 hours asking about the care you received with us. The survey should only take 5-10 minutes to complete and is very important to us.     Your feedback provides us with a way to recognize our staff who work tirelessly to provide the best care! Also, your responses help us learn how to improve when your experience was below our aspiration of excellence. We are always looking for ways to improve your stay. We WILL use your feedback to continue making improvements to help us provide the highest quality care. We keep your personal information and feedback confidential. We appreciate your time completing this survey and can't wait to hear from you!!!    We look forward to your continued care with us! Thanks so much for choosing [facility] for your healthcare needs!

## 2025-04-29 ENCOUNTER — ANTI-COAG VISIT (OUTPATIENT)
Dept: CARDIOLOGY | Facility: CLINIC | Age: 45
End: 2025-04-29
Payer: MEDICARE

## 2025-04-29 DIAGNOSIS — Z79.01 CHRONIC ANTICOAGULATION: Primary | Chronic | ICD-10-CM

## 2025-04-29 DIAGNOSIS — Z86.711 HISTORY OF PULMONARY EMBOLISM: Chronic | ICD-10-CM
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
## 2025-04-29 NOTE — PROGRESS NOTES
Pt advised on dose and INR test date. Pt requested to have INR moved to 5/14/25. Lab scheduled.

## 2025-04-30 NOTE — PROGRESS NOTES
4/30/25  Anoop with [facility] called to report Patient will be with  service, chart updated and order was faxed to HH office

## 2025-05-05 ENCOUNTER — HOSPITAL ENCOUNTER (INPATIENT)
Facility: OTHER | Age: 45
LOS: 10 days | Discharge: HOME | DRG: 812 | End: 2025-05-15
Attending: EMERGENCY MEDICINE | Admitting: HOSPITALIST
Payer: MEDICARE

## 2025-05-05 DIAGNOSIS — D57.00 SICKLE CELL PAIN CRISIS: ICD-10-CM

## 2025-05-05 DIAGNOSIS — D62 ACUTE BLOOD LOSS ANEMIA: ICD-10-CM

## 2025-05-05 DIAGNOSIS — N64.89 HEMATOMA OF RIGHT BREAST: ICD-10-CM

## 2025-05-05 DIAGNOSIS — D57.00 HB-SS DISEASE WITH VASO-OCCLUSIVE PAIN: Primary | ICD-10-CM

## 2025-05-05 DIAGNOSIS — R00.2 PALPITATIONS: ICD-10-CM

## 2025-05-05 DIAGNOSIS — D68.9 COAGULOPATHY: ICD-10-CM

## 2025-05-05 LAB
ABSOLUTE EOSINOPHIL (OHS): 0.06 K/UL
ABSOLUTE MONOCYTE (OHS): 0.8 K/UL (ref 0.3–1)
ABSOLUTE NEUTROPHIL COUNT (OHS): 5.1 K/UL (ref 1.8–7.7)
ALBUMIN SERPL BCP-MCNC: 4.1 G/DL (ref 3.5–5.2)
ALP SERPL-CCNC: 98 UNIT/L (ref 40–150)
ALT SERPL W/O P-5'-P-CCNC: 16 UNIT/L (ref 10–44)
ANION GAP (OHS): 12 MMOL/L (ref 8–16)
AST SERPL-CCNC: 23 UNIT/L (ref 11–45)
BASOPHILS # BLD AUTO: 0.04 K/UL
BASOPHILS NFR BLD AUTO: 0.5 %
BILIRUB SERPL-MCNC: 0.7 MG/DL (ref 0.1–1)
BILIRUB UR QL STRIP.AUTO: NEGATIVE
BNP SERPL-MCNC: 19 PG/ML (ref 0–99)
BUN SERPL-MCNC: 14 MG/DL (ref 6–20)
CALCIUM SERPL-MCNC: 9.8 MG/DL (ref 8.7–10.5)
CHLORIDE SERPL-SCNC: 108 MMOL/L (ref 95–110)
CLARITY UR: CLEAR
CO2 SERPL-SCNC: 20 MMOL/L (ref 23–29)
COLOR UR AUTO: YELLOW
CREAT SERPL-MCNC: 0.9 MG/DL (ref 0.5–1.4)
ERYTHROCYTE [DISTWIDTH] IN BLOOD BY AUTOMATED COUNT: 23.9 % (ref 11.5–14.5)
GFR SERPLBLD CREATININE-BSD FMLA CKD-EPI: >60 ML/MIN/1.73/M2
GLUCOSE SERPL-MCNC: 96 MG/DL (ref 70–110)
GLUCOSE UR QL STRIP: NEGATIVE
HCT VFR BLD AUTO: 31.9 % (ref 37–48.5)
HGB BLD-MCNC: 10.8 GM/DL (ref 12–16)
HGB UR QL STRIP: NEGATIVE
HOLD SPECIMEN: NORMAL
IMM GRANULOCYTES # BLD AUTO: 0.03 K/UL (ref 0–0.04)
IMM GRANULOCYTES NFR BLD AUTO: 0.3 % (ref 0–0.5)
INR PPP: 2.7 (ref 0.8–1.2)
KETONES UR QL STRIP: NEGATIVE
LEUKOCYTE ESTERASE UR QL STRIP: NEGATIVE
LYMPHOCYTES # BLD AUTO: 2.7 K/UL (ref 1–4.8)
MCH RBC QN AUTO: 22.7 PG (ref 27–31)
MCHC RBC AUTO-ENTMCNC: 33.9 G/DL (ref 32–36)
MCV RBC AUTO: 67 FL (ref 82–98)
NITRITE UR QL STRIP: NEGATIVE
NUCLEATED RBC (/100WBC) (OHS): 2 /100 WBC
PH UR STRIP: 6 [PH]
PLATELET # BLD AUTO: 497 K/UL (ref 150–450)
PMV BLD AUTO: 8.9 FL (ref 9.2–12.9)
POTASSIUM SERPL-SCNC: 3.7 MMOL/L (ref 3.5–5.1)
PROT SERPL-MCNC: 9.3 GM/DL (ref 6–8.4)
PROT UR QL STRIP: ABNORMAL
PROTHROMBIN TIME: 28.3 SECONDS (ref 9–12.5)
RBC # BLD AUTO: 4.75 M/UL (ref 4–5.4)
RELATIVE EOSINOPHIL (OHS): 0.7 %
RELATIVE LYMPHOCYTE (OHS): 30.9 % (ref 18–48)
RELATIVE MONOCYTE (OHS): 9.2 % (ref 4–15)
RELATIVE NEUTROPHIL (OHS): 58.4 % (ref 38–73)
RETICS/RBC NFR AUTO: 1 % (ref 0.5–2.5)
SODIUM SERPL-SCNC: 140 MMOL/L (ref 136–145)
SP GR UR STRIP: 1.01
T4 FREE SERPL-MCNC: 1.11 NG/DL (ref 0.71–1.51)
TROPONIN I SERPL DL<=0.01 NG/ML-MCNC: <0.006 NG/ML
TSH SERPL-ACNC: 0.13 UIU/ML (ref 0.4–4)
UROBILINOGEN UR STRIP-ACNC: NEGATIVE EU/DL
WBC # BLD AUTO: 8.73 K/UL (ref 3.9–12.7)

## 2025-05-05 PROCEDURE — 63600175 PHARM REV CODE 636 W HCPCS: Performed by: EMERGENCY MEDICINE

## 2025-05-05 PROCEDURE — 83880 ASSAY OF NATRIURETIC PEPTIDE: CPT | Performed by: EMERGENCY MEDICINE

## 2025-05-05 PROCEDURE — 96361 HYDRATE IV INFUSION ADD-ON: CPT

## 2025-05-05 PROCEDURE — 99285 EMERGENCY DEPT VISIT HI MDM: CPT | Mod: 25

## 2025-05-05 PROCEDURE — 96365 THER/PROPH/DIAG IV INF INIT: CPT

## 2025-05-05 PROCEDURE — 25000003 PHARM REV CODE 250: Performed by: PHYSICIAN ASSISTANT

## 2025-05-05 PROCEDURE — 63600175 PHARM REV CODE 636 W HCPCS: Performed by: PHYSICIAN ASSISTANT

## 2025-05-05 PROCEDURE — A4216 STERILE WATER/SALINE, 10 ML: HCPCS | Performed by: PHYSICIAN ASSISTANT

## 2025-05-05 PROCEDURE — 96366 THER/PROPH/DIAG IV INF ADDON: CPT

## 2025-05-05 PROCEDURE — 85025 COMPLETE CBC W/AUTO DIFF WBC: CPT | Performed by: NURSE PRACTITIONER

## 2025-05-05 PROCEDURE — 93005 ELECTROCARDIOGRAM TRACING: CPT

## 2025-05-05 PROCEDURE — 96375 TX/PRO/DX INJ NEW DRUG ADDON: CPT

## 2025-05-05 PROCEDURE — 81003 URINALYSIS AUTO W/O SCOPE: CPT | Performed by: NURSE PRACTITIONER

## 2025-05-05 PROCEDURE — 96376 TX/PRO/DX INJ SAME DRUG ADON: CPT

## 2025-05-05 PROCEDURE — 93010 ELECTROCARDIOGRAM REPORT: CPT | Mod: ,,, | Performed by: INTERNAL MEDICINE

## 2025-05-05 PROCEDURE — 85610 PROTHROMBIN TIME: CPT | Performed by: NURSE PRACTITIONER

## 2025-05-05 PROCEDURE — 84443 ASSAY THYROID STIM HORMONE: CPT | Performed by: NURSE PRACTITIONER

## 2025-05-05 PROCEDURE — 84075 ASSAY ALKALINE PHOSPHATASE: CPT | Performed by: NURSE PRACTITIONER

## 2025-05-05 PROCEDURE — 12000002 HC ACUTE/MED SURGE SEMI-PRIVATE ROOM

## 2025-05-05 PROCEDURE — 85045 AUTOMATED RETICULOCYTE COUNT: CPT | Performed by: NURSE PRACTITIONER

## 2025-05-05 PROCEDURE — 25000003 PHARM REV CODE 250: Performed by: EMERGENCY MEDICINE

## 2025-05-05 PROCEDURE — 84484 ASSAY OF TROPONIN QUANT: CPT | Performed by: EMERGENCY MEDICINE

## 2025-05-05 RX ORDER — ACETAMINOPHEN 325 MG/1
650 TABLET ORAL EVERY 6 HOURS PRN
Status: DISCONTINUED | OUTPATIENT
Start: 2025-05-05 | End: 2025-05-05

## 2025-05-05 RX ORDER — MUPIROCIN 20 MG/G
OINTMENT TOPICAL 2 TIMES DAILY
Status: COMPLETED | OUTPATIENT
Start: 2025-05-05 | End: 2025-05-10

## 2025-05-05 RX ORDER — IPRATROPIUM BROMIDE AND ALBUTEROL SULFATE 2.5; .5 MG/3ML; MG/3ML
3 SOLUTION RESPIRATORY (INHALATION) EVERY 4 HOURS PRN
Status: DISCONTINUED | OUTPATIENT
Start: 2025-05-05 | End: 2025-05-15 | Stop reason: HOSPADM

## 2025-05-05 RX ORDER — FLUOXETINE 20 MG/1
80 CAPSULE ORAL DAILY
Status: DISCONTINUED | OUTPATIENT
Start: 2025-05-06 | End: 2025-05-15 | Stop reason: HOSPADM

## 2025-05-05 RX ORDER — IBUPROFEN 200 MG
16 TABLET ORAL
Status: DISCONTINUED | OUTPATIENT
Start: 2025-05-05 | End: 2025-05-15 | Stop reason: HOSPADM

## 2025-05-05 RX ORDER — DIPHENHYDRAMINE HYDROCHLORIDE 50 MG/ML
50 INJECTION, SOLUTION INTRAMUSCULAR; INTRAVENOUS
Status: DISCONTINUED | OUTPATIENT
Start: 2025-05-05 | End: 2025-05-06

## 2025-05-05 RX ORDER — AMOXICILLIN 250 MG
1 CAPSULE ORAL DAILY PRN
Status: DISCONTINUED | OUTPATIENT
Start: 2025-05-05 | End: 2025-05-05

## 2025-05-05 RX ORDER — SODIUM CHLORIDE, SODIUM LACTATE, POTASSIUM CHLORIDE, CALCIUM CHLORIDE 600; 310; 30; 20 MG/100ML; MG/100ML; MG/100ML; MG/100ML
INJECTION, SOLUTION INTRAVENOUS CONTINUOUS
Status: DISCONTINUED | OUTPATIENT
Start: 2025-05-05 | End: 2025-05-15 | Stop reason: HOSPADM

## 2025-05-05 RX ORDER — WARFARIN 2.5 MG/1
2.5 TABLET ORAL DAILY
Status: DISCONTINUED | OUTPATIENT
Start: 2025-05-06 | End: 2025-05-06

## 2025-05-05 RX ORDER — AMLODIPINE BESYLATE 5 MG/1
10 TABLET ORAL DAILY
Status: DISCONTINUED | OUTPATIENT
Start: 2025-05-06 | End: 2025-05-15 | Stop reason: HOSPADM

## 2025-05-05 RX ORDER — TIZANIDINE 4 MG/1
4 TABLET ORAL EVERY 8 HOURS PRN
Status: DISCONTINUED | OUTPATIENT
Start: 2025-05-05 | End: 2025-05-08

## 2025-05-05 RX ORDER — AMOXICILLIN 250 MG
1 CAPSULE ORAL 2 TIMES DAILY PRN
Status: DISCONTINUED | OUTPATIENT
Start: 2025-05-05 | End: 2025-05-06

## 2025-05-05 RX ORDER — HYDROMORPHONE HYDROCHLORIDE 1 MG/ML
3 INJECTION, SOLUTION INTRAMUSCULAR; INTRAVENOUS; SUBCUTANEOUS
Status: COMPLETED | OUTPATIENT
Start: 2025-05-05 | End: 2025-05-05

## 2025-05-05 RX ORDER — MORPHINE SULFATE 15 MG/1
30 TABLET, FILM COATED, EXTENDED RELEASE ORAL EVERY 8 HOURS
Refills: 0 | Status: DISCONTINUED | OUTPATIENT
Start: 2025-05-06 | End: 2025-05-05

## 2025-05-05 RX ORDER — ACETAMINOPHEN 500 MG
1000 TABLET ORAL 3 TIMES DAILY
Status: DISCONTINUED | OUTPATIENT
Start: 2025-05-05 | End: 2025-05-06

## 2025-05-05 RX ORDER — GABAPENTIN 300 MG/1
600 CAPSULE ORAL 3 TIMES DAILY
Status: DISCONTINUED | OUTPATIENT
Start: 2025-05-05 | End: 2025-05-06

## 2025-05-05 RX ORDER — HYDROMORPHONE HYDROCHLORIDE 1 MG/ML
3 INJECTION, SOLUTION INTRAMUSCULAR; INTRAVENOUS; SUBCUTANEOUS
Status: DISCONTINUED | OUTPATIENT
Start: 2025-05-05 | End: 2025-05-08

## 2025-05-05 RX ORDER — GLUCAGON 1 MG
1 KIT INJECTION
Status: DISCONTINUED | OUTPATIENT
Start: 2025-05-05 | End: 2025-05-15 | Stop reason: HOSPADM

## 2025-05-05 RX ORDER — PRAZOSIN HYDROCHLORIDE 1 MG/1
1 CAPSULE ORAL NIGHTLY
Status: DISCONTINUED | OUTPATIENT
Start: 2025-05-05 | End: 2025-05-15 | Stop reason: HOSPADM

## 2025-05-05 RX ORDER — DIPHENHYDRAMINE HYDROCHLORIDE 50 MG/ML
25 INJECTION, SOLUTION INTRAMUSCULAR; INTRAVENOUS
Status: COMPLETED | OUTPATIENT
Start: 2025-05-05 | End: 2025-05-05

## 2025-05-05 RX ORDER — MORPHINE SULFATE 30 MG/1
30 TABLET, FILM COATED, EXTENDED RELEASE ORAL EVERY 8 HOURS
Refills: 0 | Status: DISCONTINUED | OUTPATIENT
Start: 2025-05-05 | End: 2025-05-15 | Stop reason: HOSPADM

## 2025-05-05 RX ORDER — HYDROCODONE BITARTRATE AND ACETAMINOPHEN 10; 325 MG/1; MG/1
1 TABLET ORAL EVERY 6 HOURS PRN
Refills: 0 | Status: DISCONTINUED | OUTPATIENT
Start: 2025-05-05 | End: 2025-05-10

## 2025-05-05 RX ORDER — SODIUM CHLORIDE 0.9 % (FLUSH) 0.9 %
10 SYRINGE (ML) INJECTION EVERY 8 HOURS
Status: DISCONTINUED | OUTPATIENT
Start: 2025-05-05 | End: 2025-05-06

## 2025-05-05 RX ORDER — FOLIC ACID 1 MG/1
1 TABLET ORAL DAILY
Status: DISCONTINUED | OUTPATIENT
Start: 2025-05-06 | End: 2025-05-15 | Stop reason: HOSPADM

## 2025-05-05 RX ORDER — NALOXONE HCL 0.4 MG/ML
0.02 VIAL (ML) INJECTION
Status: DISCONTINUED | OUTPATIENT
Start: 2025-05-05 | End: 2025-05-15 | Stop reason: HOSPADM

## 2025-05-05 RX ORDER — TALC
6 POWDER (GRAM) TOPICAL NIGHTLY PRN
Status: DISCONTINUED | OUTPATIENT
Start: 2025-05-05 | End: 2025-05-15 | Stop reason: HOSPADM

## 2025-05-05 RX ORDER — IBUPROFEN 200 MG
24 TABLET ORAL
Status: DISCONTINUED | OUTPATIENT
Start: 2025-05-05 | End: 2025-05-15 | Stop reason: HOSPADM

## 2025-05-05 RX ORDER — PANTOPRAZOLE SODIUM 40 MG/1
40 TABLET, DELAYED RELEASE ORAL DAILY
Status: DISCONTINUED | OUTPATIENT
Start: 2025-05-06 | End: 2025-05-15 | Stop reason: HOSPADM

## 2025-05-05 RX ADMIN — HYDROMORPHONE HYDROCHLORIDE 3 MG: 1 INJECTION, SOLUTION INTRAMUSCULAR; INTRAVENOUS; SUBCUTANEOUS at 07:05

## 2025-05-05 RX ADMIN — HYDROMORPHONE HYDROCHLORIDE 3 MG: 1 INJECTION, SOLUTION INTRAMUSCULAR; INTRAVENOUS; SUBCUTANEOUS at 05:05

## 2025-05-05 RX ADMIN — SODIUM CHLORIDE, POTASSIUM CHLORIDE, SODIUM LACTATE AND CALCIUM CHLORIDE: 600; 310; 30; 20 INJECTION, SOLUTION INTRAVENOUS at 11:05

## 2025-05-05 RX ADMIN — MORPHINE SULFATE 30 MG: 15 TABLET, FILM COATED, EXTENDED RELEASE ORAL at 11:05

## 2025-05-05 RX ADMIN — Medication 10 ML: at 10:05

## 2025-05-05 RX ADMIN — DIPHENHYDRAMINE HYDROCHLORIDE 25 MG: 50 INJECTION INTRAMUSCULAR; INTRAVENOUS at 05:05

## 2025-05-05 RX ADMIN — DIPHENHYDRAMINE HYDROCHLORIDE 25 MG: 50 INJECTION, SOLUTION INTRAMUSCULAR; INTRAVENOUS at 07:05

## 2025-05-05 RX ADMIN — GABAPENTIN 600 MG: 300 CAPSULE ORAL at 11:05

## 2025-05-05 RX ADMIN — HYDROMORPHONE HYDROCHLORIDE 3 MG: 1 INJECTION, SOLUTION INTRAMUSCULAR; INTRAVENOUS; SUBCUTANEOUS at 09:05

## 2025-05-05 RX ADMIN — PROMETHAZINE HYDROCHLORIDE 12.5 MG: 25 INJECTION INTRAMUSCULAR; INTRAVENOUS at 07:05

## 2025-05-05 RX ADMIN — SODIUM CHLORIDE 1000 ML: 9 INJECTION, SOLUTION INTRAVENOUS at 07:05

## 2025-05-05 RX ADMIN — PROMETHAZINE HYDROCHLORIDE 12.5 MG: 25 INJECTION INTRAMUSCULAR; INTRAVENOUS at 05:05

## 2025-05-05 RX ADMIN — ACETAMINOPHEN 1000 MG: 500 TABLET ORAL at 11:05

## 2025-05-05 RX ADMIN — PRAZOSIN HYDROCHLORIDE 1 MG: 1 CAPSULE ORAL at 11:05

## 2025-05-05 RX ADMIN — MUPIROCIN: 20 OINTMENT TOPICAL at 11:05

## 2025-05-05 RX ADMIN — DIPHENHYDRAMINE HYDROCHLORIDE 25 MG: 50 INJECTION INTRAMUSCULAR; INTRAVENOUS at 09:05
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
## 2025-05-05 NOTE — ED TRIAGE NOTES
Pt reports right leg pain for pain since last night. She says the pain is to the hip, knee and femur. It is not responding to the meds that she normally takes for sickle cell pain. She says the pain is damaged from rhabdo

## 2025-05-05 NOTE — ED NOTES
Pt informed staff that blood draws are typically done through her port due to a hx of sickle cell and poor venous access... unable to obtain labs at this time

## 2025-05-06 PROBLEM — B95.7 BACTEREMIA DUE TO STAPHYLOCOCCUS EPIDERMIDIS: Status: RESOLVED | Noted: 2025-01-10 | Resolved: 2025-05-06

## 2025-05-06 PROBLEM — D57.42 SICKLE CELL DISEASE, TYPE S BETA-ZERO THALASSEMIA: Status: ACTIVE | Noted: 2019-04-26

## 2025-05-06 PROBLEM — E87.6 HYPOKALEMIA: Status: RESOLVED | Noted: 2020-01-10 | Resolved: 2025-05-06

## 2025-05-06 PROBLEM — D57.1 HB-SS DISEASE WITHOUT CRISIS: Chronic | Status: RESOLVED | Noted: 2020-01-09 | Resolved: 2025-05-06

## 2025-05-06 PROBLEM — D57.00 SICKLE CELL ANEMIA WITH CRISIS: Status: RESOLVED | Noted: 2021-02-23 | Resolved: 2025-05-06

## 2025-05-06 PROBLEM — D57.00 SICKLE CELL PAIN CRISIS: Status: RESOLVED | Noted: 2025-01-03 | Resolved: 2025-05-06

## 2025-05-06 PROBLEM — D57.00 SICKLE-CELL DISEASE WITH PAIN: Status: RESOLVED | Noted: 2025-05-05 | Resolved: 2025-05-06

## 2025-05-06 PROBLEM — R78.81 BACTEREMIA DUE TO STAPHYLOCOCCUS EPIDERMIDIS: Status: RESOLVED | Noted: 2025-01-10 | Resolved: 2025-05-06

## 2025-05-06 LAB
ABSOLUTE EOSINOPHIL (OHS): 0.21 K/UL
ABSOLUTE MONOCYTE (OHS): 0.9 K/UL (ref 0.3–1)
ABSOLUTE NEUTROPHIL COUNT (OHS): 3.29 K/UL (ref 1.8–7.7)
ANION GAP (OHS): 10 MMOL/L (ref 8–16)
BASOPHILS # BLD AUTO: 0.04 K/UL
BASOPHILS NFR BLD AUTO: 0.5 %
BUN SERPL-MCNC: 10 MG/DL (ref 6–20)
CALCIUM SERPL-MCNC: 9.4 MG/DL (ref 8.7–10.5)
CHLORIDE SERPL-SCNC: 109 MMOL/L (ref 95–110)
CO2 SERPL-SCNC: 22 MMOL/L (ref 23–29)
CREAT SERPL-MCNC: 0.9 MG/DL (ref 0.5–1.4)
ERYTHROCYTE [DISTWIDTH] IN BLOOD BY AUTOMATED COUNT: 23.9 % (ref 11.5–14.5)
GFR SERPLBLD CREATININE-BSD FMLA CKD-EPI: >60 ML/MIN/1.73/M2
GLUCOSE SERPL-MCNC: 116 MG/DL (ref 70–110)
HCT VFR BLD AUTO: 29.6 % (ref 37–48.5)
HGB BLD-MCNC: 9.8 GM/DL (ref 12–16)
IMM GRANULOCYTES # BLD AUTO: 0.01 K/UL (ref 0–0.04)
IMM GRANULOCYTES NFR BLD AUTO: 0.1 % (ref 0–0.5)
INR PPP: 3.1 (ref 0.8–1.2)
LYMPHOCYTES # BLD AUTO: 3.44 K/UL (ref 1–4.8)
MAGNESIUM SERPL-MCNC: 1.9 MG/DL (ref 1.6–2.6)
MCH RBC QN AUTO: 22.8 PG (ref 27–31)
MCHC RBC AUTO-ENTMCNC: 33.1 G/DL (ref 32–36)
MCV RBC AUTO: 69 FL (ref 82–98)
NUCLEATED RBC (/100WBC) (OHS): 1 /100 WBC
OHS QRS DURATION: 96 MS
OHS QTC CALCULATION: 435 MS
PLATELET # BLD AUTO: 411 K/UL (ref 150–450)
PMV BLD AUTO: 8.8 FL (ref 9.2–12.9)
POTASSIUM SERPL-SCNC: 3.4 MMOL/L (ref 3.5–5.1)
PROTHROMBIN TIME: 31.9 SECONDS (ref 9–12.5)
RBC # BLD AUTO: 4.3 M/UL (ref 4–5.4)
RELATIVE EOSINOPHIL (OHS): 2.7 %
RELATIVE LYMPHOCYTE (OHS): 43.6 % (ref 18–48)
RELATIVE MONOCYTE (OHS): 11.4 % (ref 4–15)
RELATIVE NEUTROPHIL (OHS): 41.7 % (ref 38–73)
SODIUM SERPL-SCNC: 141 MMOL/L (ref 136–145)
TROPONIN I SERPL DL<=0.01 NG/ML-MCNC: <0.006 NG/ML
WBC # BLD AUTO: 7.89 K/UL (ref 3.9–12.7)

## 2025-05-06 PROCEDURE — 25000003 PHARM REV CODE 250: Performed by: INTERNAL MEDICINE

## 2025-05-06 PROCEDURE — 84484 ASSAY OF TROPONIN QUANT: CPT | Performed by: PHYSICIAN ASSISTANT

## 2025-05-06 PROCEDURE — 25000003 PHARM REV CODE 250: Performed by: EMERGENCY MEDICINE

## 2025-05-06 PROCEDURE — 80048 BASIC METABOLIC PNL TOTAL CA: CPT | Performed by: PHYSICIAN ASSISTANT

## 2025-05-06 PROCEDURE — 63600175 PHARM REV CODE 636 W HCPCS: Performed by: INTERNAL MEDICINE

## 2025-05-06 PROCEDURE — 21400001 HC TELEMETRY ROOM

## 2025-05-06 PROCEDURE — 85610 PROTHROMBIN TIME: CPT | Performed by: PHYSICIAN ASSISTANT

## 2025-05-06 PROCEDURE — 63600175 PHARM REV CODE 636 W HCPCS: Performed by: PHYSICIAN ASSISTANT

## 2025-05-06 PROCEDURE — 25000003 PHARM REV CODE 250: Performed by: PHYSICIAN ASSISTANT

## 2025-05-06 PROCEDURE — 11000001 HC ACUTE MED/SURG PRIVATE ROOM

## 2025-05-06 PROCEDURE — 83735 ASSAY OF MAGNESIUM: CPT | Performed by: PHYSICIAN ASSISTANT

## 2025-05-06 PROCEDURE — 85025 COMPLETE CBC W/AUTO DIFF WBC: CPT | Performed by: PHYSICIAN ASSISTANT

## 2025-05-06 PROCEDURE — A4216 STERILE WATER/SALINE, 10 ML: HCPCS | Performed by: PHYSICIAN ASSISTANT

## 2025-05-06 PROCEDURE — 36415 COLL VENOUS BLD VENIPUNCTURE: CPT | Performed by: PHYSICIAN ASSISTANT

## 2025-05-06 RX ORDER — ACETAMINOPHEN 500 MG
1000 TABLET ORAL EVERY 8 HOURS
Status: DISCONTINUED | OUTPATIENT
Start: 2025-05-06 | End: 2025-05-15 | Stop reason: HOSPADM

## 2025-05-06 RX ORDER — POTASSIUM CHLORIDE 750 MG/1
30 TABLET, EXTENDED RELEASE ORAL
Status: COMPLETED | OUTPATIENT
Start: 2025-05-06 | End: 2025-05-06

## 2025-05-06 RX ORDER — MORPHINE SULFATE 15 MG/1
15 TABLET, FILM COATED, EXTENDED RELEASE ORAL ONCE
Refills: 0 | Status: DISCONTINUED | OUTPATIENT
Start: 2025-05-06 | End: 2025-05-06

## 2025-05-06 RX ORDER — SODIUM CHLORIDE 0.9 % (FLUSH) 0.9 %
10 SYRINGE (ML) INJECTION
Status: DISCONTINUED | OUTPATIENT
Start: 2025-05-06 | End: 2025-05-15 | Stop reason: HOSPADM

## 2025-05-06 RX ORDER — GABAPENTIN 300 MG/1
600 CAPSULE ORAL EVERY 8 HOURS
Status: DISCONTINUED | OUTPATIENT
Start: 2025-05-06 | End: 2025-05-06

## 2025-05-06 RX ORDER — DIPHENHYDRAMINE HYDROCHLORIDE 50 MG/ML
25 INJECTION, SOLUTION INTRAMUSCULAR; INTRAVENOUS EVERY 6 HOURS
Status: DISCONTINUED | OUTPATIENT
Start: 2025-05-06 | End: 2025-05-06

## 2025-05-06 RX ORDER — GABAPENTIN 300 MG/1
900 CAPSULE ORAL EVERY 8 HOURS
Status: DISCONTINUED | OUTPATIENT
Start: 2025-05-06 | End: 2025-05-15 | Stop reason: HOSPADM

## 2025-05-06 RX ORDER — AMOXICILLIN 250 MG
1 CAPSULE ORAL 2 TIMES DAILY
Status: DISCONTINUED | OUTPATIENT
Start: 2025-05-06 | End: 2025-05-15 | Stop reason: HOSPADM

## 2025-05-06 RX ORDER — DIPHENHYDRAMINE HYDROCHLORIDE 50 MG/ML
25 INJECTION, SOLUTION INTRAMUSCULAR; INTRAVENOUS
Status: DISCONTINUED | OUTPATIENT
Start: 2025-05-06 | End: 2025-05-08

## 2025-05-06 RX ORDER — MORPHINE SULFATE 15 MG/1
15 TABLET, FILM COATED, EXTENDED RELEASE ORAL EVERY 12 HOURS
Refills: 0 | Status: DISCONTINUED | OUTPATIENT
Start: 2025-05-06 | End: 2025-05-06

## 2025-05-06 RX ORDER — DIPHENHYDRAMINE HYDROCHLORIDE 50 MG/ML
25 INJECTION, SOLUTION INTRAMUSCULAR; INTRAVENOUS EVERY 6 HOURS PRN
Status: DISCONTINUED | OUTPATIENT
Start: 2025-05-06 | End: 2025-05-06

## 2025-05-06 RX ORDER — WARFARIN 2.5 MG/1
2.5 TABLET ORAL DAILY
Status: DISCONTINUED | OUTPATIENT
Start: 2025-05-07 | End: 2025-05-15 | Stop reason: HOSPADM

## 2025-05-06 RX ADMIN — DIPHENHYDRAMINE HYDROCHLORIDE 25 MG: 50 INJECTION, SOLUTION INTRAMUSCULAR; INTRAVENOUS at 11:05

## 2025-05-06 RX ADMIN — DIPHENHYDRAMINE HYDROCHLORIDE 25 MG: 50 INJECTION, SOLUTION INTRAMUSCULAR; INTRAVENOUS at 04:05

## 2025-05-06 RX ADMIN — MORPHINE SULFATE 30 MG: 15 TABLET, FILM COATED, EXTENDED RELEASE ORAL at 06:05

## 2025-05-06 RX ADMIN — FLUOXETINE HYDROCHLORIDE 80 MG: 20 CAPSULE ORAL at 08:05

## 2025-05-06 RX ADMIN — Medication 10 ML: at 06:05

## 2025-05-06 RX ADMIN — FOLIC ACID 1 MG: 1 TABLET ORAL at 08:05

## 2025-05-06 RX ADMIN — PRAZOSIN HYDROCHLORIDE 1 MG: 1 CAPSULE ORAL at 09:05

## 2025-05-06 RX ADMIN — HYDROMORPHONE HYDROCHLORIDE 3 MG: 1 INJECTION, SOLUTION INTRAMUSCULAR; INTRAVENOUS; SUBCUTANEOUS at 08:05

## 2025-05-06 RX ADMIN — MORPHINE SULFATE 30 MG: 15 TABLET, FILM COATED, EXTENDED RELEASE ORAL at 02:05

## 2025-05-06 RX ADMIN — MUPIROCIN: 20 OINTMENT TOPICAL at 08:05

## 2025-05-06 RX ADMIN — HYDROMORPHONE HYDROCHLORIDE 3 MG: 1 INJECTION, SOLUTION INTRAMUSCULAR; INTRAVENOUS; SUBCUTANEOUS at 04:05

## 2025-05-06 RX ADMIN — POTASSIUM CHLORIDE 30 MEQ: 750 TABLET, EXTENDED RELEASE ORAL at 08:05

## 2025-05-06 RX ADMIN — HYDROMORPHONE HYDROCHLORIDE 3 MG: 1 INJECTION, SOLUTION INTRAMUSCULAR; INTRAVENOUS; SUBCUTANEOUS at 07:05

## 2025-05-06 RX ADMIN — ACETAMINOPHEN 1000 MG: 500 TABLET, FILM COATED ORAL at 09:05

## 2025-05-06 RX ADMIN — DIPHENHYDRAMINE HYDROCHLORIDE 50 MG: 50 INJECTION, SOLUTION INTRAMUSCULAR; INTRAVENOUS at 10:05

## 2025-05-06 RX ADMIN — PANTOPRAZOLE SODIUM 40 MG: 40 TABLET, DELAYED RELEASE ORAL at 08:05

## 2025-05-06 RX ADMIN — HYDROMORPHONE HYDROCHLORIDE 3 MG: 1 INJECTION, SOLUTION INTRAMUSCULAR; INTRAVENOUS; SUBCUTANEOUS at 12:05

## 2025-05-06 RX ADMIN — ACETAMINOPHEN 1000 MG: 500 TABLET, FILM COATED ORAL at 02:05

## 2025-05-06 RX ADMIN — HYDROMORPHONE HYDROCHLORIDE 3 MG: 1 INJECTION, SOLUTION INTRAMUSCULAR; INTRAVENOUS; SUBCUTANEOUS at 11:05

## 2025-05-06 RX ADMIN — GABAPENTIN 900 MG: 300 CAPSULE ORAL at 09:05

## 2025-05-06 RX ADMIN — MORPHINE SULFATE 30 MG: 15 TABLET, FILM COATED, EXTENDED RELEASE ORAL at 09:05

## 2025-05-06 RX ADMIN — DIPHENHYDRAMINE HYDROCHLORIDE 25 MG: 50 INJECTION, SOLUTION INTRAMUSCULAR; INTRAVENOUS at 08:05

## 2025-05-06 RX ADMIN — DIPHENHYDRAMINE HYDROCHLORIDE 50 MG: 50 INJECTION, SOLUTION INTRAMUSCULAR; INTRAVENOUS at 12:05

## 2025-05-06 RX ADMIN — SODIUM CHLORIDE, POTASSIUM CHLORIDE, SODIUM LACTATE AND CALCIUM CHLORIDE: 600; 310; 30; 20 INJECTION, SOLUTION INTRAVENOUS at 07:05

## 2025-05-06 RX ADMIN — DIPHENHYDRAMINE HYDROCHLORIDE 50 MG: 50 INJECTION, SOLUTION INTRAMUSCULAR; INTRAVENOUS at 07:05

## 2025-05-06 RX ADMIN — MUPIROCIN: 20 OINTMENT TOPICAL at 09:05

## 2025-05-06 RX ADMIN — POTASSIUM CHLORIDE 30 MEQ: 750 TABLET, EXTENDED RELEASE ORAL at 10:05

## 2025-05-06 RX ADMIN — GABAPENTIN 900 MG: 300 CAPSULE ORAL at 02:05

## 2025-05-06 RX ADMIN — DIPHENHYDRAMINE HYDROCHLORIDE 25 MG: 50 INJECTION, SOLUTION INTRAMUSCULAR; INTRAVENOUS at 01:05

## 2025-05-06 RX ADMIN — HYDROCODONE BITARTRATE AND ACETAMINOPHEN 1 TABLET: 10; 325 TABLET ORAL at 03:05

## 2025-05-06 RX ADMIN — HYDROMORPHONE HYDROCHLORIDE 3 MG: 1 INJECTION, SOLUTION INTRAMUSCULAR; INTRAVENOUS; SUBCUTANEOUS at 01:05

## 2025-05-06 RX ADMIN — DIPHENHYDRAMINE HYDROCHLORIDE 50 MG: 50 INJECTION, SOLUTION INTRAMUSCULAR; INTRAVENOUS at 04:05

## 2025-05-06 RX ADMIN — AMLODIPINE BESYLATE 10 MG: 5 TABLET ORAL at 08:05

## 2025-05-06 RX ADMIN — SENNOSIDES AND DOCUSATE SODIUM 1 TABLET: 50; 8.6 TABLET ORAL at 04:05

## 2025-05-06 RX ADMIN — HYDROMORPHONE HYDROCHLORIDE 3 MG: 1 INJECTION, SOLUTION INTRAMUSCULAR; INTRAVENOUS; SUBCUTANEOUS at 10:05

## 2025-05-06 NOTE — ASSESSMENT & PLAN NOTE
- Ms. Nazanin Malone presents with pain c/w her typical sickle cell pain  - labs ware without elevated retic count to indicate active sickling   - H&H are stable and elevated compared to baseline   - hydrate   - PRN and scheduled medications for pain ordered   - monitor

## 2025-05-06 NOTE — PLAN OF CARE
Problem: Adult Inpatient Plan of Care  Goal: Plan of Care Review  Outcome: Progressing     Problem: Adult Inpatient Plan of Care  Goal: Patient-Specific Goal (Individualized)  Outcome: Progressing     Problem: Adult Inpatient Plan of Care  Goal: Optimal Comfort and Wellbeing  Outcome: Progressing     Problem: Acute Kidney Injury/Impairment  Goal: Fluid and Electrolyte Balance  Outcome: Progressing     Problem: Acute Kidney Injury/Impairment  Goal: Improved Oral Intake  Outcome: Progressing     Problem: Acute Kidney Injury/Impairment  Goal: Effective Renal Function  Outcome: Progressing     Problem: Infection  Goal: Absence of Infection Signs and Symptoms  Outcome: Progressing

## 2025-05-06 NOTE — SUBJECTIVE & OBJECTIVE
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    Asthma     Avascular necrosis of femur     Depression     History of pulmonary embolism     Hypertension     Morbid obesity     Opioid dependence 2017    Pneumonia due to Streptococcus pneumoniae 2017    Right lower lobe pneumonia 2017    Sepsis due to Streptococcus pneumoniae 2017    Sickle cell-beta thalassemia disease with pain     Trigeminal neuralgia        Past Surgical History:   Procedure Laterality Date     SECTION      ESOPHAGOGASTRODUODENOSCOPY N/A 2024    Procedure: EGD (ESOPHAGOGASTRODUODENOSCOPY);  Surgeon: Allen Marshall MD;  Location: [address]);  Service: Gastroenterology;  Laterality: N/A;    TONSILLECTOMY      TUBAL LIGATION         Review of patient's allergies indicates:   Allergen Reactions    Cat dander Anaphylaxis, Itching and Shortness Of Breath    Nsaids (non-steroidal anti-inflammatory drug) Itching and Anaphylaxis    Latex Rash       No current facility-administered medications on file prior to encounter.     Current Outpatient Medications on File Prior to Encounter   Medication Sig    acetaminophen (TYLENOL) 325 MG tablet Take 2 tablets (650 mg total) by mouth every 8 (eight) hours as needed for Pain.    albuterol (VENTOLIN HFA) 90 mcg/actuation inhaler Inhale 2 puffs into the lungs every 6 (six) hours as needed for Wheezing or Shortness of Breath. Rescue    amLODIPine (NORVASC) 10 MG tablet Take 1 tablet (10 mg total) by mouth once daily.    ascorbic acid (VITAMIN C ORAL) Take 1 capsule by mouth Daily.    dicyclomine (BENTYL) 10 MG capsule Take 10 mg by mouth daily as needed.    FLUoxetine 40 MG capsule Take 2 capsules (80 mg total) by mouth once daily.    fluticasone propionate (FLONASE) 50 mcg/actuation nasal spray INSTILL 1 SPRAY INTO EACH NOSTRIL EVERY DAY (Patient taking differently:  1 spray by Each Nostril route daily as needed.)    folic acid (FOLVITE) 1 MG tablet Take 1 tablet (1 mg total) by mouth once daily.    gabapentin (NEURONTIN) 300 MG capsule Take 2-3 capsules (600-900 mg total) by mouth 3 (three) times daily.    HYDROcodone-acetaminophen (NORCO)  mg per tablet Take 1 tablet by mouth every 4 to 6 hours as needed for Pain.    hydroxyurea (HYDREA) 500 mg Cap Take 2 capsules (1,000 mg total) by mouth once daily.    morphine (MS CONTIN) 30 MG 12 hr tablet Take 1 tablet (30 mg total) by mouth 2 (two) times daily.    naloxone (NARCAN) 4 mg/actuation Spry 4mg by nasal route as needed for opioid overdose; may repeat every 2-3 minutes in alternating nostrils until medical help arrives. Call 911    pantoprazole (PROTONIX) 40 MG tablet Take 1 tablet (40 mg total) by mouth once daily.    prazosin (MINIPRESS) 1 MG Cap Take 1 capsule (1 mg total) by mouth every evening.    senna-docusate 8.6-50 mg (PERICOLACE) 8.6-50 mg per tablet Take 1 tablet by mouth daily as needed for Constipation.    tiZANidine (ZANAFLEX) 4 MG tablet Take 1 tablet (4 mg total) by mouth every 8 (eight) hours as needed (Muscle spasms).    warfarin (COUMADIN) 2.5 MG tablet Take 1 tablet (2.5 mg total) by mouth Daily.     Family History       Problem Relation (Age of Onset)    Diabetes Mother    Heart disease Mother, Father          Tobacco Use    Smoking status: Never    Smokeless tobacco: Never   Substance and Sexual Activity    Alcohol use: No    Drug use: Yes     Types: Marijuana     Comment: periodically     Sexual activity: Not Currently     Birth control/protection: See Surgical Hx     Review of Systems   Constitutional:  Negative for activity change, appetite change, chills, fatigue and fever.   Respiratory:  Negative for cough, shortness of breath and wheezing.    Cardiovascular:  Negative for chest pain and palpitations.        + chest fullness   Gastrointestinal:  Negative for abdominal pain, constipation,  diarrhea, nausea and vomiting.   Genitourinary:  Negative for dysuria, flank pain, frequency, hematuria and urgency.   Musculoskeletal:  Positive for arthralgias (b/l legs in thighs and in b/l hips) and myalgias. Negative for back pain, gait problem, joint swelling, neck pain and neck stiffness.   Skin:  Negative for color change and rash.   Neurological:  Negative for dizziness, syncope, weakness, light-headedness and headaches.   Psychiatric/Behavioral:  Negative for agitation and confusion.      Objective:     Vital Signs (Most Recent):  Temp: 98.3 °F (36.8 °C) (05/06/25 0025)  Pulse: 88 (05/06/25 0300)  Resp: 16 (05/06/25 0421)  BP: 124/60 (05/06/25 0400)  SpO2: 95 % (05/06/25 0300) Vital Signs (24h Range):  Temp:  [98.3 °F (36.8 °C)-98.9 °F (37.2 °C)] 98.3 °F (36.8 °C)  Pulse:  [78-96] 88  Resp:  [16-17] 16  SpO2:  [95 %-100 %] 95 %  BP: (124-189)/() 124/60     Weight: 93.9 kg (207 lb)  Body mass index is 37.86 kg/m².     Physical Exam  Vitals and nursing note reviewed.   Constitutional:       General: She is not in acute distress.     Appearance: She is well-developed and normal weight. She is not ill-appearing, toxic-appearing or diaphoretic.   HENT:      Head: Normocephalic and atraumatic.   Eyes:      General: No scleral icterus.        Right eye: No discharge.         Left eye: No discharge.      Conjunctiva/sclera: Conjunctivae normal.   Neck:      Trachea: No tracheal deviation.   Cardiovascular:      Rate and Rhythm: Normal rate and regular rhythm.      Heart sounds: Normal heart sounds. No murmur heard.     No gallop.   Pulmonary:      Effort: Pulmonary effort is normal. No respiratory distress.      Breath sounds: Normal breath sounds. No stridor. No wheezing or rales.   Abdominal:      General: Bowel sounds are normal. There is no distension.      Palpations: Abdomen is soft. There is no mass.      Tenderness: There is no abdominal tenderness. There is no guarding.   Musculoskeletal:          "General: No deformity. Normal range of motion.      Cervical back: Normal range of motion and neck supple.   Skin:     General: Skin is warm and dry.      Coloration: Skin is not pale.      Findings: No erythema or rash.   Neurological:      General: No focal deficit present.      Mental Status: She is alert and oriented to person, place, and time.      Cranial Nerves: No cranial nerve deficit.      Motor: No abnormal muscle tone.   Psychiatric:         Mood and Affect: Mood normal.         Behavior: Behavior normal.         Thought Content: Thought content normal.         Judgment: Judgment normal.                Significant Labs: All pertinent labs within the past 24 hours have been reviewed.  BMP:   Recent Labs   Lab 05/05/25  1627   GLU 96      K 3.7      CO2 20*   BUN 14   CREATININE 0.9   CALCIUM 9.8     CBC:   Recent Labs   Lab 05/05/25  1627   WBC 8.73   HGB 10.8*   HCT 31.9*   *     CMP:   Recent Labs   Lab 05/05/25  1627      K 3.7      CO2 20*   GLU 96   BUN 14   CREATININE 0.9   CALCIUM 9.8   PROT 9.3*   ALBUMIN 4.1   BILITOT 0.7   ALKPHOS 98   AST 23   ALT 16   ANIONGAP 12     Urine Culture: No results for input(s): "LABURIN" in the last 48 hours.  Urine Studies:   Recent Labs   Lab 05/05/25 1955   COLORU Yellow   APPEARANCEUA Clear   PHUR 6.0   SPECGRAV 1.015   PROTEINUA Trace*   GLUCUA Negative   BILIRUBINUA Negative   OCCULTUA Negative   NITRITE Negative   UROBILINOGEN Negative   LEUKOCYTESUR Negative       Significant Imaging: I have reviewed all pertinent imaging results/findings within the past 24 hours.  Imaging Results              X-Ray Chest AP Portable (Final result)  Result time 05/05/25 22:45:53      Final result by Clarissa Antonio MD (05/05/25 22:45:53)                   Impression:      No acute cardiopulmonary process identified.      Electronically signed by: Clarissa Antonio MD  Date:    05/05/2025  Time:    22:45               Narrative:    " EXAMINATION:  XR CHEST AP PORTABLE    CLINICAL HISTORY:  Hb-SS disease with crisis, unspecified    TECHNIQUE:  Single frontal view of the chest was performed.    COMPARISON:  04/22/2025.    FINDINGS:  Cardiac silhouette is normal in size.  Right-sided chest port is in stable position.  Lungs are expanded.  No evidence of new focal consolidative process, pneumothorax, or significant pleural effusion.  No acute osseous abnormality identified.

## 2025-05-06 NOTE — ASSESSMENT & PLAN NOTE
Patient's blood pressure range in the last 24 hours was: BP  Min: 124/60  Max: 189/98.The patient's inpatient anti-hypertensive regimen is listed below:  Current Antihypertensives  amLODIPine tablet 10 mg, Daily, Oral  prazosin capsule 1 mg, Nightly, Oral    Plan  - BP is controlled, no changes needed to their regimen

## 2025-05-06 NOTE — ASSESSMENT & PLAN NOTE
"Chief Complaint   Patient presents with     Urgent Care     Cat Bite     9:15AM today while cat sitting one of the cats which is rather unpredictable bit him on his right hand, clamped down pretty hard.  Area around bite is becoming swollen/reddened/more painful.  Thinks cat is up to date on vaccinations.  Most recent tetanus vaccination was in April 2014.     Initial /76  Pulse 81  Temp 98.1  F (36.7  C) (Oral)  Ht 5' 8\" (1.727 m)  Wt 128 lb (58.1 kg)  SpO2 100%  BMI 19.46 kg/m2 Estimated body mass index is 19.46 kg/(m^2) as calculated from the following:    Height as of this encounter: 5' 8\" (1.727 m).    Weight as of this encounter: 128 lb (58.1 kg)..  BP completed using cuff size: regular  JUNIE Castillo  " - Continue fluoxetine 80mg PO daily, prazosin 1mg PO qHS.
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
## 2025-05-06 NOTE — ED NOTES
05/05/25 8773   Safety Interventions   Quick Updates - Free Text SOHAIL. Pt says her pain is still a 10. Told pt were are getting her pain meds now. Denies any other needs or concerns at this time   Updates Patient is resting comfortably;Vitals stable;Bed rails up - Call light within reach   Patient Rounds bed in low position;bed wheels locked;call light in patient/parent reach;ID band on;placement of personal items at bedside;clutter free environment maintained;visualized patient
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
## 2025-05-06 NOTE — ED NOTES
05/06/25 0541   Safety Interventions   Quick Updates - Free Text NADN   Updates Bed rails up - Call light within reach;Patient is resting comfortably;Vitals stable   Patient Rounds bed in low position;bed wheels locked;call light in patient/parent reach;clutter free environment maintained;ID band on;placement of personal items at bedside;visualized patient

## 2025-05-07 LAB
ABSOLUTE EOSINOPHIL (OHS): 0.33 K/UL
ABSOLUTE MONOCYTE (OHS): 0.82 K/UL (ref 0.3–1)
ABSOLUTE NEUTROPHIL COUNT (OHS): 1.84 K/UL (ref 1.8–7.7)
ANION GAP (OHS): 8 MMOL/L (ref 8–16)
BASOPHILS # BLD AUTO: 0.06 K/UL
BASOPHILS NFR BLD AUTO: 0.9 %
BUN SERPL-MCNC: 11 MG/DL (ref 6–20)
CALCIUM SERPL-MCNC: 9 MG/DL (ref 8.7–10.5)
CHLORIDE SERPL-SCNC: 109 MMOL/L (ref 95–110)
CO2 SERPL-SCNC: 22 MMOL/L (ref 23–29)
CREAT SERPL-MCNC: 1 MG/DL (ref 0.5–1.4)
ERYTHROCYTE [DISTWIDTH] IN BLOOD BY AUTOMATED COUNT: 23.8 % (ref 11.5–14.5)
GFR SERPLBLD CREATININE-BSD FMLA CKD-EPI: >60 ML/MIN/1.73/M2
GLUCOSE SERPL-MCNC: 86 MG/DL (ref 70–110)
HCT VFR BLD AUTO: 28.4 % (ref 37–48.5)
HGB BLD-MCNC: 9.1 GM/DL (ref 12–16)
IMM GRANULOCYTES # BLD AUTO: 0.01 K/UL (ref 0–0.04)
IMM GRANULOCYTES NFR BLD AUTO: 0.2 % (ref 0–0.5)
INR PPP: 3.1 (ref 0.8–1.2)
LYMPHOCYTES # BLD AUTO: 3.4 K/UL (ref 1–4.8)
MAGNESIUM SERPL-MCNC: 1.8 MG/DL (ref 1.6–2.6)
MCH RBC QN AUTO: 22.5 PG (ref 27–31)
MCHC RBC AUTO-ENTMCNC: 32 G/DL (ref 32–36)
MCV RBC AUTO: 70 FL (ref 82–98)
NUCLEATED RBC (/100WBC) (OHS): 2 /100 WBC
PLATELET # BLD AUTO: 372 K/UL (ref 150–450)
PMV BLD AUTO: 9.1 FL (ref 9.2–12.9)
POTASSIUM SERPL-SCNC: 4.5 MMOL/L (ref 3.5–5.1)
PROTHROMBIN TIME: 32.5 SECONDS (ref 9–12.5)
RBC # BLD AUTO: 4.05 M/UL (ref 4–5.4)
RELATIVE EOSINOPHIL (OHS): 5.1 %
RELATIVE LYMPHOCYTE (OHS): 52.6 % (ref 18–48)
RELATIVE MONOCYTE (OHS): 12.7 % (ref 4–15)
RELATIVE NEUTROPHIL (OHS): 28.5 % (ref 38–73)
SODIUM SERPL-SCNC: 139 MMOL/L (ref 136–145)
WBC # BLD AUTO: 6.46 K/UL (ref 3.9–12.7)

## 2025-05-07 PROCEDURE — 80048 BASIC METABOLIC PNL TOTAL CA: CPT | Performed by: PHYSICIAN ASSISTANT

## 2025-05-07 PROCEDURE — 63600175 PHARM REV CODE 636 W HCPCS: Performed by: PHYSICIAN ASSISTANT

## 2025-05-07 PROCEDURE — 25000003 PHARM REV CODE 250: Performed by: PHYSICIAN ASSISTANT

## 2025-05-07 PROCEDURE — 83735 ASSAY OF MAGNESIUM: CPT | Performed by: PHYSICIAN ASSISTANT

## 2025-05-07 PROCEDURE — 63600175 PHARM REV CODE 636 W HCPCS: Performed by: INTERNAL MEDICINE

## 2025-05-07 PROCEDURE — 94761 N-INVAS EAR/PLS OXIMETRY MLT: CPT

## 2025-05-07 PROCEDURE — 85025 COMPLETE CBC W/AUTO DIFF WBC: CPT | Performed by: PHYSICIAN ASSISTANT

## 2025-05-07 PROCEDURE — 25000003 PHARM REV CODE 250: Performed by: EMERGENCY MEDICINE

## 2025-05-07 PROCEDURE — 25000003 PHARM REV CODE 250: Performed by: INTERNAL MEDICINE

## 2025-05-07 PROCEDURE — 85610 PROTHROMBIN TIME: CPT | Performed by: PHYSICIAN ASSISTANT

## 2025-05-07 PROCEDURE — 36415 COLL VENOUS BLD VENIPUNCTURE: CPT | Performed by: PHYSICIAN ASSISTANT

## 2025-05-07 PROCEDURE — 11000001 HC ACUTE MED/SURG PRIVATE ROOM

## 2025-05-07 PROCEDURE — 21400001 HC TELEMETRY ROOM

## 2025-05-07 RX ORDER — HYDROMORPHONE HYDROCHLORIDE 2 MG/ML
3 INJECTION, SOLUTION INTRAMUSCULAR; INTRAVENOUS; SUBCUTANEOUS EVERY 4 HOURS PRN
Status: DISCONTINUED | OUTPATIENT
Start: 2025-05-08 | End: 2025-05-08

## 2025-05-07 RX ORDER — HYDROXYZINE HYDROCHLORIDE 25 MG/1
50 TABLET, FILM COATED ORAL EVERY 4 HOURS PRN
Status: DISCONTINUED | OUTPATIENT
Start: 2025-05-07 | End: 2025-05-15 | Stop reason: HOSPADM

## 2025-05-07 RX ADMIN — GABAPENTIN 900 MG: 300 CAPSULE ORAL at 05:05

## 2025-05-07 RX ADMIN — MORPHINE SULFATE 30 MG: 15 TABLET, FILM COATED, EXTENDED RELEASE ORAL at 05:05

## 2025-05-07 RX ADMIN — MUPIROCIN: 20 OINTMENT TOPICAL at 08:05

## 2025-05-07 RX ADMIN — HYDROMORPHONE HYDROCHLORIDE 3 MG: 1 INJECTION, SOLUTION INTRAMUSCULAR; INTRAVENOUS; SUBCUTANEOUS at 02:05

## 2025-05-07 RX ADMIN — HYDROXYZINE HYDROCHLORIDE 50 MG: 25 TABLET ORAL at 03:05

## 2025-05-07 RX ADMIN — HYDROMORPHONE HYDROCHLORIDE 3 MG: 1 INJECTION, SOLUTION INTRAMUSCULAR; INTRAVENOUS; SUBCUTANEOUS at 11:05

## 2025-05-07 RX ADMIN — ACETAMINOPHEN 1000 MG: 500 TABLET, FILM COATED ORAL at 05:05

## 2025-05-07 RX ADMIN — FOLIC ACID 1 MG: 1 TABLET ORAL at 08:05

## 2025-05-07 RX ADMIN — DIPHENHYDRAMINE HYDROCHLORIDE 25 MG: 50 INJECTION, SOLUTION INTRAMUSCULAR; INTRAVENOUS at 02:05

## 2025-05-07 RX ADMIN — FLUOXETINE HYDROCHLORIDE 80 MG: 20 CAPSULE ORAL at 08:05

## 2025-05-07 RX ADMIN — DIPHENHYDRAMINE HYDROCHLORIDE 25 MG: 50 INJECTION, SOLUTION INTRAMUSCULAR; INTRAVENOUS at 08:05

## 2025-05-07 RX ADMIN — HYDROMORPHONE HYDROCHLORIDE 3 MG: 1 INJECTION, SOLUTION INTRAMUSCULAR; INTRAVENOUS; SUBCUTANEOUS at 05:05

## 2025-05-07 RX ADMIN — MORPHINE SULFATE 30 MG: 15 TABLET, FILM COATED, EXTENDED RELEASE ORAL at 09:05

## 2025-05-07 RX ADMIN — AMLODIPINE BESYLATE 10 MG: 5 TABLET ORAL at 08:05

## 2025-05-07 RX ADMIN — PANTOPRAZOLE SODIUM 40 MG: 40 TABLET, DELAYED RELEASE ORAL at 02:05

## 2025-05-07 RX ADMIN — HYDROMORPHONE HYDROCHLORIDE 3 MG: 1 INJECTION, SOLUTION INTRAMUSCULAR; INTRAVENOUS; SUBCUTANEOUS at 08:05

## 2025-05-07 RX ADMIN — DIPHENHYDRAMINE HYDROCHLORIDE 25 MG: 50 INJECTION, SOLUTION INTRAMUSCULAR; INTRAVENOUS at 05:05

## 2025-05-07 RX ADMIN — WARFARIN SODIUM 2.5 MG: 2.5 TABLET ORAL at 05:05

## 2025-05-07 RX ADMIN — MORPHINE SULFATE 30 MG: 15 TABLET, FILM COATED, EXTENDED RELEASE ORAL at 02:05

## 2025-05-07 RX ADMIN — DIPHENHYDRAMINE HYDROCHLORIDE 25 MG: 50 INJECTION, SOLUTION INTRAMUSCULAR; INTRAVENOUS at 11:05

## 2025-05-07 RX ADMIN — SODIUM CHLORIDE, POTASSIUM CHLORIDE, SODIUM LACTATE AND CALCIUM CHLORIDE: 600; 310; 30; 20 INJECTION, SOLUTION INTRAVENOUS at 09:05

## 2025-05-07 RX ADMIN — ACETAMINOPHEN 1000 MG: 500 TABLET, FILM COATED ORAL at 02:05

## 2025-05-07 RX ADMIN — SENNOSIDES AND DOCUSATE SODIUM 1 TABLET: 50; 8.6 TABLET ORAL at 08:05

## 2025-05-07 RX ADMIN — GABAPENTIN 900 MG: 300 CAPSULE ORAL at 09:05

## 2025-05-07 RX ADMIN — GABAPENTIN 900 MG: 300 CAPSULE ORAL at 02:05

## 2025-05-07 RX ADMIN — LACTULOSE 20 G: 20 SOLUTION ORAL at 08:05

## 2025-05-07 RX ADMIN — PRAZOSIN HYDROCHLORIDE 1 MG: 1 CAPSULE ORAL at 08:05

## 2025-05-07 RX ADMIN — ACETAMINOPHEN 1000 MG: 500 TABLET, FILM COATED ORAL at 09:05

## 2025-05-07 NOTE — SUBJECTIVE & OBJECTIVE
Interval History: No acute events overnight. Reports pain feels comparable to yesterday. No other concerns at this time.    Review of Systems   Constitutional:  Negative for chills and fever.   Respiratory:  Negative for cough and shortness of breath.    Cardiovascular:  Negative for chest pain and palpitations.   Gastrointestinal:  Negative for abdominal pain, nausea and vomiting.   Musculoskeletal:  Positive for arthralgias and myalgias.     Objective:     Vital Signs (Most Recent):  Temp: 98.2 °F (36.8 °C) (05/07/25 0803)  Pulse: 90 (05/07/25 0803)  Resp: 17 (05/07/25 0841)  BP: (!) 148/73 (05/07/25 0803)  SpO2: 98 % (05/07/25 0803) Vital Signs (24h Range):  Temp:  [98.2 °F (36.8 °C)-99.1 °F (37.3 °C)] 98.2 °F (36.8 °C)  Pulse:  [71-93] 90  Resp:  [16-20] 17  SpO2:  [93 %-98 %] 98 %  BP: (122-179)/() 148/73     Weight: 112.4 kg (247 lb 12.8 oz)  Body mass index is 45.32 kg/m².    Intake/Output Summary (Last 24 hours) at 5/7/2025 1131  Last data filed at 5/7/2025 0800  Gross per 24 hour   Intake 2277.32 ml   Output 0 ml   Net 2277.32 ml         Physical Exam  Vitals and nursing note reviewed.   Constitutional:       General: She is not in acute distress.     Appearance: She is well-developed.   HENT:      Head: Normocephalic and atraumatic.   Eyes:      General:         Right eye: No discharge.         Left eye: No discharge.      Conjunctiva/sclera: Conjunctivae normal.   Cardiovascular:      Rate and Rhythm: Normal rate.      Pulses: Normal pulses.   Pulmonary:      Effort: Pulmonary effort is normal. No respiratory distress.   Abdominal:      Palpations: Abdomen is soft.      Tenderness: There is no abdominal tenderness.   Musculoskeletal:         General: Tenderness present. Normal range of motion.      Right lower leg: No edema.      Left lower leg: No edema.   Skin:     General: Skin is warm and dry.   Neurological:      Mental Status: She is alert and oriented to person, place, and time.         Significant Labs:   CBC:  Recent Labs   Lab 05/05/25  1627 05/06/25  0512 05/07/25  0531   WBC 8.73 7.89 6.46   HGB 10.8* 9.8* 9.1*   HCT 31.9* 29.6* 28.4*   * 411 372   LYMPH 2.70  30.9 3.44  43.6 3.40  52.6*   MONO 9.2  0.80 11.4  0.90 12.7  0.82   EOS 0.7  0.06 2.7  0.21 5.1  0.33   CMP:  Recent Labs   Lab 05/05/25  1627 05/06/25  0512 05/07/25  0531    141 139   K 3.7 3.4* 4.5    109 109   CO2 20* 22* 22*   BUN 14 10 11   CREATININE 0.9 0.9 1.0   GLU 96 116* 86   CALCIUM 9.8 9.4 9.0   MG  --  1.9 1.8   ALKPHOS 98  --   --    AST 23  --   --    ALT 16  --   --    BILITOT 0.7  --   --    PROT 9.3*  --   --    ALBUMIN 4.1  --   --    ANIONGAP 12 10 8      Significant Imaging: I have reviewed and interpreted all pertinent imaging results/findings within the past 24 hours.
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
## 2025-05-07 NOTE — PLAN OF CARE
Case Management Assessment     PCP: None    Patient Arrived From: Home  Existing Help at Home: Self, Independent    Barriers to Discharge: Transportation    Discharge Plan:    A. Home Health   B. Home       Patient AAOX3, independent at baseline. Will need to establish care with PCP on discharge. Denies owning any DME. Will need transportation home.     05/07/25 1000   Discharge Assessment   Assessment Type Discharge Planning Assessment   Confirmed/corrected address, phone number and insurance Yes   Confirmed Demographics Correct on Facesheet   Source of Information patient   Communicated ALYSE with patient/caregiver Date not available/Unable to determine   People in Home alone   Do you expect to return to your current living situation? Yes   Do you have help at home or someone to help you manage your care at home? No   Prior to hospitilization cognitive status: Alert/Oriented   Current cognitive status: Alert/Oriented   Walking or Climbing Stairs Difficulty no   Dressing/Bathing Difficulty no   Equipment Currently Used at Home none   Readmission within 30 days? Yes   Do you currently have service(s) that help you manage your care at home? Yes   Name and Contact number of agency [facility]   Is the pt/caregiver preference to resume services with current agency Yes   Do you take prescription medications? Yes   Do you have prescription coverage? Yes   Do you have any problems affording any of your prescribed medications? No   Is the patient taking medications as prescribed? yes   Who is going to help you get home at discharge? Will need transportation home   How do you get to doctors appointments? public transportation   Are you on dialysis? No   Do you take coumadin? No   Discharge Plan A Home Health   Discharge Plan B Home with family   DME Needed Upon Discharge  none   Discharge Plan discussed with: Patient   Transition of Care Barriers Transportation;Underinsured     [facility] ([facility]  [facility]  Initial Discharge Assessment       Primary Care Provider: Kezia, Primary Doctor    Admission Diagnosis: Palpitations [R00.2]  Sickle cell pain crisis [D57.00]    Admission Date: 5/5/2025  Expected Discharge Date: 5/9/2025    Transition of Care Barriers: (P) Transportation, Underinsured    Payor: [employer] MANAGED MEDICARE / Plan: [employer] MEDICARE PLAN HMO / Product Type: Medicare Advantage /     Extended Emergency Contact Information  Primary Emergency Contact: shayysherif  Mobile Phone: [phone]  Relation: Mother  Preferred language: English   needed? No  Secondary Emergency Contact: Jack Malone  Mobile Phone: [phone]  Relation: Brother    Discharge Plan A: (P) Home Health  Discharge Plan B: (P) Home with family      [facility] [address] [place] [address]  Phone: [phone] Fax: [phone]      Initial Assessment (most recent)       Adult Discharge Assessment - 05/07/25 1000          Discharge Assessment    Assessment Type Discharge Planning Assessment (P)      Confirmed/corrected address, phone number and insurance Yes (P)      Confirmed Demographics Correct on Facesheet (P)      Source of Information patient (P)      Communicated ALYSE with patient/caregiver Date not available/Unable to determine (P)      People in Home alone (P)      Do you expect to return to your current living situation? Yes (P)      Do you have help at home or someone to help you manage your care at home? No (P)      Prior to hospitilization cognitive status: Alert/Oriented (P)      Current cognitive status: Alert/Oriented (P)      Walking or Climbing Stairs Difficulty no (P)      Dressing/Bathing Difficulty no (P)      Equipment Currently Used at Home none (P)      Readmission within 30 days? Yes (P)      Do you currently have service(s) that help you manage your care at home? Yes (P)      Name and Contact number of agency [facility] (P)      Is the pt/caregiver preference  to resume services with current agency Yes (P)      Do you take prescription medications? Yes (P)      Do you have prescription coverage? Yes (P)      Do you have any problems affording any of your prescribed medications? No (P)      Is the patient taking medications as prescribed? yes (P)      Who is going to help you get home at discharge? Will need transportation home (P)      How do you get to doctors appointments? public transportation (P)      Are you on dialysis? No (P)      Do you take coumadin? No (P)      Discharge Plan A Home Health (P)      Discharge Plan B Home with family (P)      DME Needed Upon Discharge  none (P)      Discharge Plan discussed with: Patient (P)      Transition of Care Barriers Transportation;Underinsured (P)
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
## 2025-05-07 NOTE — ASSESSMENT & PLAN NOTE
- Sickle cell anemia with pain and chronic opiate use for symptom control.  - Continue adjunct pain control with acetaminophen 1000mg PO q8hr, gabapentin at 900mg PO q8hr.  - Continue morphine 30mg PO q8hr, hydrocodone-acetaminophen 10-325mg PO q6hr PRN, hydromorphone 3mg IV q3hr PRN, diphenhydramine 25mg IV q3hr PRN.  - Continue IVFs with LR.  - Monitor symptoms and adjust as required.  - Reports outpatient follow-up scheduled for 05/20; may need further exchange transfusions in outpatient setting, last in record appear to be in 2024.
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
## 2025-05-07 NOTE — PLAN OF CARE
Problem: Adult Inpatient Plan of Care  Goal: Plan of Care Review  Outcome: Progressing  Flowsheets (Taken 5/7/2025 0448)  Plan of Care Reviewed With: patient  Goal: Optimal Comfort and Wellbeing  Outcome: Progressing  Intervention: Monitor Pain and Promote Comfort  Flowsheets (Taken 5/7/2025 0448)  Pain Management Interventions:   pain management plan reviewed with patient/caregiver   quiet environment facilitated   relaxation techniques promoted

## 2025-05-08 LAB
ABSOLUTE EOSINOPHIL (OHS): 0.27 K/UL
ABSOLUTE MONOCYTE (OHS): 0.82 K/UL (ref 0.3–1)
ABSOLUTE NEUTROPHIL COUNT (OHS): 2.76 K/UL (ref 1.8–7.7)
ANION GAP (OHS): 9 MMOL/L (ref 8–16)
BASOPHILS # BLD AUTO: 0.04 K/UL
BASOPHILS NFR BLD AUTO: 0.6 %
BUN SERPL-MCNC: 13 MG/DL (ref 6–20)
CALCIUM SERPL-MCNC: 9.2 MG/DL (ref 8.7–10.5)
CHLORIDE SERPL-SCNC: 107 MMOL/L (ref 95–110)
CO2 SERPL-SCNC: 24 MMOL/L (ref 23–29)
CREAT SERPL-MCNC: 1 MG/DL (ref 0.5–1.4)
ERYTHROCYTE [DISTWIDTH] IN BLOOD BY AUTOMATED COUNT: 23.9 % (ref 11.5–14.5)
GFR SERPLBLD CREATININE-BSD FMLA CKD-EPI: >60 ML/MIN/1.73/M2
GLUCOSE SERPL-MCNC: 101 MG/DL (ref 70–110)
HCT VFR BLD AUTO: 30.2 % (ref 37–48.5)
HGB BLD-MCNC: 9.8 GM/DL (ref 12–16)
IMM GRANULOCYTES # BLD AUTO: 0.01 K/UL (ref 0–0.04)
IMM GRANULOCYTES NFR BLD AUTO: 0.1 % (ref 0–0.5)
INR PPP: 2 (ref 0.8–1.2)
LYMPHOCYTES # BLD AUTO: 3.15 K/UL (ref 1–4.8)
MAGNESIUM SERPL-MCNC: 1.8 MG/DL (ref 1.6–2.6)
MCH RBC QN AUTO: 22.7 PG (ref 27–31)
MCHC RBC AUTO-ENTMCNC: 32.5 G/DL (ref 32–36)
MCV RBC AUTO: 70 FL (ref 82–98)
NUCLEATED RBC (/100WBC) (OHS): 2 /100 WBC
PLATELET # BLD AUTO: 400 K/UL (ref 150–450)
PMV BLD AUTO: 9.1 FL (ref 9.2–12.9)
POTASSIUM SERPL-SCNC: 4.4 MMOL/L (ref 3.5–5.1)
PROTHROMBIN TIME: 21.4 SECONDS (ref 9–12.5)
RBC # BLD AUTO: 4.31 M/UL (ref 4–5.4)
RELATIVE EOSINOPHIL (OHS): 3.8 %
RELATIVE LYMPHOCYTE (OHS): 44.7 % (ref 18–48)
RELATIVE MONOCYTE (OHS): 11.6 % (ref 4–15)
RELATIVE NEUTROPHIL (OHS): 39.2 % (ref 38–73)
SODIUM SERPL-SCNC: 140 MMOL/L (ref 136–145)
WBC # BLD AUTO: 7.05 K/UL (ref 3.9–12.7)

## 2025-05-08 PROCEDURE — 36415 COLL VENOUS BLD VENIPUNCTURE: CPT | Performed by: PHYSICIAN ASSISTANT

## 2025-05-08 PROCEDURE — 80048 BASIC METABOLIC PNL TOTAL CA: CPT | Performed by: PHYSICIAN ASSISTANT

## 2025-05-08 PROCEDURE — 83735 ASSAY OF MAGNESIUM: CPT | Performed by: PHYSICIAN ASSISTANT

## 2025-05-08 PROCEDURE — 85610 PROTHROMBIN TIME: CPT | Performed by: PHYSICIAN ASSISTANT

## 2025-05-08 PROCEDURE — 63600175 PHARM REV CODE 636 W HCPCS: Performed by: INTERNAL MEDICINE

## 2025-05-08 PROCEDURE — 63600175 PHARM REV CODE 636 W HCPCS: Performed by: PHYSICIAN ASSISTANT

## 2025-05-08 PROCEDURE — 25000003 PHARM REV CODE 250: Performed by: PHYSICIAN ASSISTANT

## 2025-05-08 PROCEDURE — 21400001 HC TELEMETRY ROOM

## 2025-05-08 PROCEDURE — 25000003 PHARM REV CODE 250: Performed by: EMERGENCY MEDICINE

## 2025-05-08 PROCEDURE — 85025 COMPLETE CBC W/AUTO DIFF WBC: CPT | Performed by: PHYSICIAN ASSISTANT

## 2025-05-08 PROCEDURE — 25000003 PHARM REV CODE 250: Performed by: INTERNAL MEDICINE

## 2025-05-08 RX ORDER — TIZANIDINE 4 MG/1
4 TABLET ORAL EVERY 8 HOURS
Status: DISCONTINUED | OUTPATIENT
Start: 2025-05-08 | End: 2025-05-10

## 2025-05-08 RX ORDER — METHOCARBAMOL 500 MG/1
500 TABLET, FILM COATED ORAL 3 TIMES DAILY PRN
Status: DISCONTINUED | OUTPATIENT
Start: 2025-05-08 | End: 2025-05-15 | Stop reason: HOSPADM

## 2025-05-08 RX ORDER — HYDROMORPHONE HYDROCHLORIDE 2 MG/ML
3 INJECTION, SOLUTION INTRAMUSCULAR; INTRAVENOUS; SUBCUTANEOUS EVERY 4 HOURS PRN
Status: DISPENSED | OUTPATIENT
Start: 2025-05-08 | End: 2025-05-10

## 2025-05-08 RX ORDER — DIPHENHYDRAMINE HYDROCHLORIDE 50 MG/ML
50 INJECTION, SOLUTION INTRAMUSCULAR; INTRAVENOUS EVERY 4 HOURS PRN
Status: DISCONTINUED | OUTPATIENT
Start: 2025-05-08 | End: 2025-05-15 | Stop reason: HOSPADM

## 2025-05-08 RX ORDER — HYDROMORPHONE HYDROCHLORIDE 2 MG/ML
3 INJECTION, SOLUTION INTRAMUSCULAR; INTRAVENOUS; SUBCUTANEOUS
Status: DISPENSED | OUTPATIENT
Start: 2025-05-08 | End: 2025-05-08

## 2025-05-08 RX ADMIN — TIZANIDINE 4 MG: 4 TABLET ORAL at 03:05

## 2025-05-08 RX ADMIN — HYDROMORPHONE HYDROCHLORIDE 3 MG: 1 INJECTION, SOLUTION INTRAMUSCULAR; INTRAVENOUS; SUBCUTANEOUS at 02:05

## 2025-05-08 RX ADMIN — HYDROMORPHONE HYDROCHLORIDE 3 MG: 2 INJECTION INTRAMUSCULAR; INTRAVENOUS; SUBCUTANEOUS at 04:05

## 2025-05-08 RX ADMIN — DIPHENHYDRAMINE HYDROCHLORIDE 50 MG: 50 INJECTION, SOLUTION INTRAMUSCULAR; INTRAVENOUS at 10:05

## 2025-05-08 RX ADMIN — GABAPENTIN 900 MG: 300 CAPSULE ORAL at 01:05

## 2025-05-08 RX ADMIN — DIPHENHYDRAMINE HYDROCHLORIDE 25 MG: 50 INJECTION, SOLUTION INTRAMUSCULAR; INTRAVENOUS at 08:05

## 2025-05-08 RX ADMIN — ACETAMINOPHEN 1000 MG: 500 TABLET, FILM COATED ORAL at 09:05

## 2025-05-08 RX ADMIN — DIPHENHYDRAMINE HYDROCHLORIDE 50 MG: 50 INJECTION, SOLUTION INTRAMUSCULAR; INTRAVENOUS at 04:05

## 2025-05-08 RX ADMIN — ACETAMINOPHEN 1000 MG: 500 TABLET, FILM COATED ORAL at 01:05

## 2025-05-08 RX ADMIN — HYDROMORPHONE HYDROCHLORIDE 3 MG: 2 INJECTION INTRAMUSCULAR; INTRAVENOUS; SUBCUTANEOUS at 10:05

## 2025-05-08 RX ADMIN — MORPHINE SULFATE 30 MG: 15 TABLET, FILM COATED, EXTENDED RELEASE ORAL at 09:05

## 2025-05-08 RX ADMIN — AMLODIPINE BESYLATE 10 MG: 5 TABLET ORAL at 08:05

## 2025-05-08 RX ADMIN — SENNOSIDES AND DOCUSATE SODIUM 1 TABLET: 50; 8.6 TABLET ORAL at 08:05

## 2025-05-08 RX ADMIN — TIZANIDINE 4 MG: 4 TABLET ORAL at 10:05

## 2025-05-08 RX ADMIN — HYDROMORPHONE HYDROCHLORIDE 3 MG: 1 INJECTION, SOLUTION INTRAMUSCULAR; INTRAVENOUS; SUBCUTANEOUS at 05:05

## 2025-05-08 RX ADMIN — MUPIROCIN: 20 OINTMENT TOPICAL at 10:05

## 2025-05-08 RX ADMIN — SODIUM CHLORIDE, POTASSIUM CHLORIDE, SODIUM LACTATE AND CALCIUM CHLORIDE: 600; 310; 30; 20 INJECTION, SOLUTION INTRAVENOUS at 09:05

## 2025-05-08 RX ADMIN — PANTOPRAZOLE SODIUM 40 MG: 40 TABLET, DELAYED RELEASE ORAL at 08:05

## 2025-05-08 RX ADMIN — MUPIROCIN: 20 OINTMENT TOPICAL at 08:05

## 2025-05-08 RX ADMIN — HYDROMORPHONE HYDROCHLORIDE 3 MG: 2 INJECTION INTRAMUSCULAR; INTRAVENOUS; SUBCUTANEOUS at 08:05

## 2025-05-08 RX ADMIN — SENNOSIDES AND DOCUSATE SODIUM 1 TABLET: 50; 8.6 TABLET ORAL at 10:05

## 2025-05-08 RX ADMIN — DIPHENHYDRAMINE HYDROCHLORIDE 25 MG: 50 INJECTION, SOLUTION INTRAMUSCULAR; INTRAVENOUS at 02:05

## 2025-05-08 RX ADMIN — LACTULOSE 20 G: 20 SOLUTION ORAL at 08:05

## 2025-05-08 RX ADMIN — GABAPENTIN 900 MG: 300 CAPSULE ORAL at 09:05

## 2025-05-08 RX ADMIN — MORPHINE SULFATE 30 MG: 15 TABLET, FILM COATED, EXTENDED RELEASE ORAL at 05:05

## 2025-05-08 RX ADMIN — HYDROMORPHONE HYDROCHLORIDE 3 MG: 2 INJECTION INTRAMUSCULAR; INTRAVENOUS; SUBCUTANEOUS at 11:05

## 2025-05-08 RX ADMIN — WARFARIN SODIUM 2.5 MG: 2.5 TABLET ORAL at 05:05

## 2025-05-08 RX ADMIN — DIPHENHYDRAMINE HYDROCHLORIDE 25 MG: 50 INJECTION, SOLUTION INTRAMUSCULAR; INTRAVENOUS at 11:05

## 2025-05-08 RX ADMIN — ACETAMINOPHEN 1000 MG: 500 TABLET, FILM COATED ORAL at 05:05

## 2025-05-08 RX ADMIN — MORPHINE SULFATE 30 MG: 15 TABLET, FILM COATED, EXTENDED RELEASE ORAL at 01:05

## 2025-05-08 RX ADMIN — DIPHENHYDRAMINE HYDROCHLORIDE 25 MG: 50 INJECTION, SOLUTION INTRAMUSCULAR; INTRAVENOUS at 05:05

## 2025-05-08 RX ADMIN — FOLIC ACID 1 MG: 1 TABLET ORAL at 08:05

## 2025-05-08 RX ADMIN — PRAZOSIN HYDROCHLORIDE 1 MG: 1 CAPSULE ORAL at 10:05

## 2025-05-08 RX ADMIN — FLUOXETINE HYDROCHLORIDE 80 MG: 20 CAPSULE ORAL at 08:05

## 2025-05-08 RX ADMIN — GABAPENTIN 900 MG: 300 CAPSULE ORAL at 05:05

## 2025-05-08 NOTE — PLAN OF CARE
Problem: Adult Inpatient Plan of Care  Goal: Absence of Hospital-Acquired Illness or Injury  Outcome: Progressing     Problem: Adult Inpatient Plan of Care  Goal: Optimal Comfort and Wellbeing  Outcome: Progressing     Problem: Adult Inpatient Plan of Care  Goal: Readiness for Transition of Care  Outcome: Progressing     Problem: Acute Kidney Injury/Impairment  Goal: Fluid and Electrolyte Balance  Outcome: Progressing     Problem: Acute Kidney Injury/Impairment  Goal: Improved Oral Intake  Outcome: Progressing     Problem: Acute Kidney Injury/Impairment  Goal: Effective Renal Function  Outcome: Progressing

## 2025-05-08 NOTE — PLAN OF CARE
Problem: Adult Inpatient Plan of Care  Goal: Plan of Care Review  Outcome: Progressing  Flowsheets (Taken 5/8/2025 0424)  Plan of Care Reviewed With: patient  Goal: Optimal Comfort and Wellbeing  Outcome: Progressing  Intervention: Monitor Pain and Promote Comfort  Flowsheets (Taken 5/8/2025 0424)  Pain Management Interventions:   pain management plan reviewed with patient/caregiver   quiet environment facilitated   relaxation techniques promoted

## 2025-05-08 NOTE — PLAN OF CARE
Plan of care reviewed with patient.  Patient verbalized understanding and had no further questions.  Patient continues to receive IV analgesics, and IV fluids around the clock.  Patient now resting comfortably in bed locked in lowest position, side rails up x3, and call bell in reach.  Will continue to monitor.       Problem: Adult Inpatient Plan of Care  Goal: Plan of Care Review  Outcome: Progressing  Goal: Patient-Specific Goal (Individualized)  Outcome: Progressing  Goal: Absence of Hospital-Acquired Illness or Injury  Outcome: Progressing  Goal: Optimal Comfort and Wellbeing  Outcome: Progressing  Goal: Readiness for Transition of Care  Outcome: Progressing     Problem: Acute Kidney Injury/Impairment  Goal: Fluid and Electrolyte Balance  Outcome: Progressing  Goal: Improved Oral Intake  Outcome: Progressing  Goal: Effective Renal Function  Outcome: Progressing     Problem: Infection  Goal: Absence of Infection Signs and Symptoms  Outcome: Progressing     Problem: Bariatric Environmental Safety  Goal: Safety Maintained with Care  Outcome: Progressing

## 2025-05-08 NOTE — ASSESSMENT & PLAN NOTE
- Sickle cell anemia with pain and chronic opiate use for symptom control.  - Continue adjunct pain control with acetaminophen 1000mg PO q8hr, gabapentin at 900mg PO q8hr.  - Continue morphine 30mg PO q8hr, hydrocodone-acetaminophen 10-325mg PO q6hr PRN, hydromorphone 3mg IV at q4hr PRN, diphenhydramine as 50mg IV q4hr PRN. Add tizanidine 4mg PO TID.  - Continue IVFs with LR.  - Monitor symptoms and adjust as required.  - Reports outpatient follow-up scheduled for 05/20; may need further exchange transfusions in outpatient setting, last in record appear to be in 2024.

## 2025-05-08 NOTE — SUBJECTIVE & OBJECTIVE
Interval History: No acute events overnight. Reports pain feels comparable to yesterday, a little more prominent in L neck / chest wall and R leg. No other concerns at this time.    Review of Systems   Constitutional:  Negative for chills and fever.   Respiratory:  Negative for cough and shortness of breath.    Cardiovascular:  Negative for chest pain and palpitations.   Gastrointestinal:  Negative for abdominal pain, nausea and vomiting.   Musculoskeletal:  Positive for arthralgias and myalgias.     Objective:     Vital Signs (Most Recent):  Temp: 98.6 °F (37 °C) (05/08/25 1615)  Pulse: 83 (05/08/25 1615)  Resp: 14 (05/08/25 1615)  BP: (!) 166/82 (05/08/25 1615)  SpO2: 95 % (05/08/25 1615) Vital Signs (24h Range):  Temp:  [98.2 °F (36.8 °C)-98.7 °F (37.1 °C)] 98.6 °F (37 °C)  Pulse:  [83-90] 83  Resp:  [14-20] 14  SpO2:  [93 %-99 %] 95 %  BP: (117-166)/(66-99) 166/82     Weight: 112.4 kg (247 lb 12.8 oz)  Body mass index is 45.32 kg/m².    Intake/Output Summary (Last 24 hours) at 5/8/2025 1728  Last data filed at 5/8/2025 1433  Gross per 24 hour   Intake 4180.15 ml   Output 2700 ml   Net 1480.15 ml         Physical Exam  Vitals and nursing note reviewed.   Constitutional:       General: She is not in acute distress.     Appearance: She is well-developed.   HENT:      Head: Normocephalic and atraumatic.   Eyes:      General:         Right eye: No discharge.         Left eye: No discharge.      Conjunctiva/sclera: Conjunctivae normal.   Cardiovascular:      Rate and Rhythm: Normal rate.      Pulses: Normal pulses.   Pulmonary:      Effort: Pulmonary effort is normal. No respiratory distress.   Abdominal:      Palpations: Abdomen is soft.      Tenderness: There is no abdominal tenderness.   Musculoskeletal:         General: Tenderness present. Normal range of motion.      Right lower leg: No edema.      Left lower leg: No edema.   Skin:     General: Skin is warm and dry.   Neurological:      Mental Status: She is  alert and oriented to person, place, and time.        Significant Labs:   CBC:  Recent Labs   Lab 05/06/25  0512 05/07/25  0531 05/08/25  0450   WBC 7.89 6.46 7.05   HGB 9.8* 9.1* 9.8*   HCT 29.6* 28.4* 30.2*    372 400   LYMPH 3.44  43.6 3.40  52.6* 3.15  44.7   MONO 11.4  0.90 12.7  0.82 11.6  0.82   EOS 2.7  0.21 5.1  0.33 3.8  0.27   CMP:  Recent Labs   Lab 05/06/25  0512 05/07/25  0531 05/08/25  0450    139 140   K 3.4* 4.5 4.4    109 107   CO2 22* 22* 24   BUN 10 11 13   CREATININE 0.9 1.0 1.0   * 86 101   CALCIUM 9.4 9.0 9.2   MG 1.9 1.8 1.8   ANIONGAP 10 8 9      Significant Imaging: I have reviewed and interpreted all pertinent imaging results/findings within the past 24 hours.
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
## 2025-05-09 LAB
INR PPP: 1.7 (ref 0.8–1.2)
PROTHROMBIN TIME: 18.5 SECONDS (ref 9–12.5)

## 2025-05-09 PROCEDURE — 25000003 PHARM REV CODE 250: Performed by: INTERNAL MEDICINE

## 2025-05-09 PROCEDURE — 36415 COLL VENOUS BLD VENIPUNCTURE: CPT | Performed by: PHYSICIAN ASSISTANT

## 2025-05-09 PROCEDURE — 94761 N-INVAS EAR/PLS OXIMETRY MLT: CPT

## 2025-05-09 PROCEDURE — 63600175 PHARM REV CODE 636 W HCPCS: Performed by: INTERNAL MEDICINE

## 2025-05-09 PROCEDURE — 63600175 PHARM REV CODE 636 W HCPCS: Performed by: PHYSICIAN ASSISTANT

## 2025-05-09 PROCEDURE — 21400001 HC TELEMETRY ROOM

## 2025-05-09 PROCEDURE — 25000003 PHARM REV CODE 250: Performed by: PHYSICIAN ASSISTANT

## 2025-05-09 PROCEDURE — 85610 PROTHROMBIN TIME: CPT | Performed by: PHYSICIAN ASSISTANT

## 2025-05-09 RX ADMIN — HYDROMORPHONE HYDROCHLORIDE 3 MG: 2 INJECTION INTRAMUSCULAR; INTRAVENOUS; SUBCUTANEOUS at 06:05

## 2025-05-09 RX ADMIN — DIPHENHYDRAMINE HYDROCHLORIDE 50 MG: 50 INJECTION, SOLUTION INTRAMUSCULAR; INTRAVENOUS at 10:05

## 2025-05-09 RX ADMIN — HYDROMORPHONE HYDROCHLORIDE 3 MG: 2 INJECTION INTRAMUSCULAR; INTRAVENOUS; SUBCUTANEOUS at 10:05

## 2025-05-09 RX ADMIN — HYDROMORPHONE HYDROCHLORIDE 3 MG: 2 INJECTION INTRAMUSCULAR; INTRAVENOUS; SUBCUTANEOUS at 02:05

## 2025-05-09 RX ADMIN — TIZANIDINE 4 MG: 4 TABLET ORAL at 05:05

## 2025-05-09 RX ADMIN — MORPHINE SULFATE 30 MG: 15 TABLET, FILM COATED, EXTENDED RELEASE ORAL at 09:05

## 2025-05-09 RX ADMIN — WARFARIN SODIUM 2.5 MG: 2.5 TABLET ORAL at 05:05

## 2025-05-09 RX ADMIN — DIPHENHYDRAMINE HYDROCHLORIDE 50 MG: 50 INJECTION, SOLUTION INTRAMUSCULAR; INTRAVENOUS at 02:05

## 2025-05-09 RX ADMIN — FOLIC ACID 1 MG: 1 TABLET ORAL at 08:05

## 2025-05-09 RX ADMIN — GABAPENTIN 900 MG: 300 CAPSULE ORAL at 09:05

## 2025-05-09 RX ADMIN — HYDROXYZINE HYDROCHLORIDE 50 MG: 25 TABLET ORAL at 10:05

## 2025-05-09 RX ADMIN — MUPIROCIN: 20 OINTMENT TOPICAL at 08:05

## 2025-05-09 RX ADMIN — PRAZOSIN HYDROCHLORIDE 1 MG: 1 CAPSULE ORAL at 09:05

## 2025-05-09 RX ADMIN — MORPHINE SULFATE 30 MG: 15 TABLET, FILM COATED, EXTENDED RELEASE ORAL at 01:05

## 2025-05-09 RX ADMIN — AMLODIPINE BESYLATE 10 MG: 5 TABLET ORAL at 08:05

## 2025-05-09 RX ADMIN — MORPHINE SULFATE 30 MG: 15 TABLET, FILM COATED, EXTENDED RELEASE ORAL at 05:05

## 2025-05-09 RX ADMIN — DIPHENHYDRAMINE HYDROCHLORIDE 50 MG: 50 INJECTION, SOLUTION INTRAMUSCULAR; INTRAVENOUS at 06:05

## 2025-05-09 RX ADMIN — ACETAMINOPHEN 1000 MG: 500 TABLET, FILM COATED ORAL at 01:05

## 2025-05-09 RX ADMIN — MUPIROCIN: 20 OINTMENT TOPICAL at 09:05

## 2025-05-09 RX ADMIN — ACETAMINOPHEN 1000 MG: 500 TABLET, FILM COATED ORAL at 09:05

## 2025-05-09 RX ADMIN — ACETAMINOPHEN 1000 MG: 500 TABLET, FILM COATED ORAL at 05:05

## 2025-05-09 RX ADMIN — LACTULOSE 20 G: 20 SOLUTION ORAL at 08:05

## 2025-05-09 RX ADMIN — GABAPENTIN 900 MG: 300 CAPSULE ORAL at 05:05

## 2025-05-09 RX ADMIN — SENNOSIDES AND DOCUSATE SODIUM 1 TABLET: 50; 8.6 TABLET ORAL at 08:05

## 2025-05-09 RX ADMIN — TIZANIDINE 4 MG: 4 TABLET ORAL at 09:05

## 2025-05-09 RX ADMIN — PANTOPRAZOLE SODIUM 40 MG: 40 TABLET, DELAYED RELEASE ORAL at 08:05

## 2025-05-09 RX ADMIN — GABAPENTIN 900 MG: 300 CAPSULE ORAL at 01:05

## 2025-05-09 RX ADMIN — SODIUM CHLORIDE, POTASSIUM CHLORIDE, SODIUM LACTATE AND CALCIUM CHLORIDE: 600; 310; 30; 20 INJECTION, SOLUTION INTRAVENOUS at 06:05

## 2025-05-09 RX ADMIN — SENNOSIDES AND DOCUSATE SODIUM 1 TABLET: 50; 8.6 TABLET ORAL at 09:05

## 2025-05-09 RX ADMIN — TIZANIDINE 4 MG: 4 TABLET ORAL at 01:05

## 2025-05-09 RX ADMIN — FLUOXETINE HYDROCHLORIDE 80 MG: 20 CAPSULE ORAL at 08:05

## 2025-05-09 NOTE — PLAN OF CARE
SW met with pt at bedside this AM to complete dc reassessment. Pt stated that she lives at home with her drt and mother, but will be needing transportation at time of dc. Pt stated that she does not use any HME at home and is fully independent in her ADLs. Pt does not receive any HD services. Pt stated that she is current with [facility] and would like to resume with them at time of dc. Pt did not have any additional questions or concerns for sw at this time. White board updated with CM name and contact information.  Discharge brochure provided.  Pt encouraged to call with any questions or concerns.  Cm will continue to follow pt through transitions of care and assist with any discharge needs.    Aramis Trimble, MSLUCIO  [phone]    Future Appointments   Date Time Provider Department Center   5/14/2025  9:45 AM LAB[facility] [place] [facility]   5/20/2025  1:20 PM [facility]   5/20/2025  2:20 PM Vijaya Soni MD [facility] [place]        05/09/25 1130   Discharge Reassessment   Assessment Type Discharge Planning Reassessment   Did the patient's condition or plan change since previous assessment? No   Discharge Plan discussed with: Patient   Communicated ALYSE with patient/caregiver Yes   Discharge Plan A Home Health   DME Needed Upon Discharge  other (see comments)  (TBD)   Transition of Care Barriers None   Post-Acute Status   Post-Acute Authorization Home Health   Discharge Delays None known at this time
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
## 2025-05-09 NOTE — PLAN OF CARE
Problem: Fall Injury Risk  Goal: Absence of Fall and Fall-Related Injury  Outcome: Progressing     Problem: Pain Acute  Goal: Optimal Pain Control and Function  Outcome: Progressing     Patient AAOx4. NADN.   During shift: pain management, LR @ 125 mL/hr.   Monitor labs.   POC reviewed with patient, questions answered, concerns acknowledged.  Safety measures in place, call light within reach.

## 2025-05-09 NOTE — SUBJECTIVE & OBJECTIVE
Interval History: No acute events overnight. Reports pain doing a little better today.    Review of Systems   Constitutional:  Negative for chills and fever.   Respiratory:  Negative for cough and shortness of breath.    Cardiovascular:  Negative for chest pain and palpitations.   Gastrointestinal:  Negative for abdominal pain, nausea and vomiting.   Musculoskeletal:  Positive for arthralgias and myalgias.     Objective:     Vital Signs (Most Recent):  Temp: 98.6 °F (37 °C) (05/09/25 1211)  Pulse: 73 (05/09/25 1211)  Resp: 17 (05/09/25 1312)  BP: 133/71 (05/09/25 1211)  SpO2: 95 % (05/09/25 1211) Vital Signs (24h Range):  Temp:  [98.2 °F (36.8 °C)-99.1 °F (37.3 °C)] 98.6 °F (37 °C)  Pulse:  [65-83] 73  Resp:  [14-20] 17  SpO2:  [87 %-98 %] 95 %  BP: (116-166)/(67-87) 133/71     Weight: 112.4 kg (247 lb 12.8 oz)  Body mass index is 45.32 kg/m².    Intake/Output Summary (Last 24 hours) at 5/9/2025 1453  Last data filed at 5/9/2025 1056  Gross per 24 hour   Intake 120 ml   Output 2600 ml   Net -2480 ml         Physical Exam  Vitals and nursing note reviewed.   Constitutional:       General: She is not in acute distress.     Appearance: She is well-developed.   HENT:      Head: Normocephalic and atraumatic.   Eyes:      General:         Right eye: No discharge.         Left eye: No discharge.      Conjunctiva/sclera: Conjunctivae normal.   Cardiovascular:      Rate and Rhythm: Normal rate.      Pulses: Normal pulses.   Pulmonary:      Effort: Pulmonary effort is normal. No respiratory distress.   Abdominal:      Palpations: Abdomen is soft.      Tenderness: There is no abdominal tenderness.   Musculoskeletal:         General: Tenderness present. Normal range of motion.      Right lower leg: No edema.      Left lower leg: No edema.   Skin:     General: Skin is warm and dry.   Neurological:      Mental Status: She is alert and oriented to person, place, and time.        Significant Labs:   CBC:  Recent Labs   Lab  05/07/25  0531 05/08/25  0450   WBC 6.46 7.05   HGB 9.1* 9.8*   HCT 28.4* 30.2*    400   LYMPH 3.40  52.6* 3.15  44.7   MONO 12.7  0.82 11.6  0.82   EOS 5.1  0.33 3.8  0.27   CMP:  Recent Labs   Lab 05/07/25  0531 05/08/25  0450    140   K 4.5 4.4    107   CO2 22* 24   BUN 11 13   CREATININE 1.0 1.0   GLU 86 101   CALCIUM 9.0 9.2   MG 1.8 1.8   ANIONGAP 8 9      Significant Imaging: I have reviewed and interpreted all pertinent imaging results/findings within the past 24 hours.

## 2025-05-09 NOTE — ASSESSMENT & PLAN NOTE
Anemia is likely due to acute blood loss from hematoma, chronic blood loss from sickle cell.  Patient does have coagulopathy from chronic anticoagulation use, and did present with significant coagulopathy with INR 8.  Most recent hemoglobin and hematocrit are listed below.  Recent Labs     05/07/25  0531 05/08/25  0450   HGB 9.1* 9.8*   HCT 28.4* 30.2*     Plan  - Monitor serial CBC: Daily  - Transfuse PRBC if patient becomes hemodynamically unstable, symptomatic or H/H drops below 7/21.  - Patient has received 2 units of PRBCs on 4/18and 4/22  - Patient's anemia is currently stable

## 2025-05-09 NOTE — NURSING
Patient seen pausing the IV pump and disconnecting her IV tubing from port and sitting it on the bed. Patient educated on risk for infection and, the importance of not disconnecting the IV tubing and touching the IV pump. Patient informed to call nurse, or roll the IV pump with her, patient verbalized understanding. Primary Nurse notified.
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
## 2025-05-10 LAB
HCT VFR BLD AUTO: 29.9 % (ref 37–48.5)
HGB BLD-MCNC: 9.8 GM/DL (ref 12–16)
INR PPP: 1.6 (ref 0.8–1.2)
PROTHROMBIN TIME: 17.2 SECONDS (ref 9–12.5)

## 2025-05-10 PROCEDURE — 25000003 PHARM REV CODE 250: Performed by: EMERGENCY MEDICINE

## 2025-05-10 PROCEDURE — 25000003 PHARM REV CODE 250: Performed by: PHYSICIAN ASSISTANT

## 2025-05-10 PROCEDURE — 99900035 HC TECH TIME PER 15 MIN (STAT)

## 2025-05-10 PROCEDURE — 63600175 PHARM REV CODE 636 W HCPCS: Performed by: INTERNAL MEDICINE

## 2025-05-10 PROCEDURE — 85014 HEMATOCRIT: CPT | Performed by: INTERNAL MEDICINE

## 2025-05-10 PROCEDURE — 21400001 HC TELEMETRY ROOM

## 2025-05-10 PROCEDURE — 94761 N-INVAS EAR/PLS OXIMETRY MLT: CPT

## 2025-05-10 PROCEDURE — 36415 COLL VENOUS BLD VENIPUNCTURE: CPT | Performed by: INTERNAL MEDICINE

## 2025-05-10 PROCEDURE — 85610 PROTHROMBIN TIME: CPT | Performed by: PHYSICIAN ASSISTANT

## 2025-05-10 PROCEDURE — 25000003 PHARM REV CODE 250: Performed by: INTERNAL MEDICINE

## 2025-05-10 PROCEDURE — 85018 HEMOGLOBIN: CPT | Performed by: INTERNAL MEDICINE

## 2025-05-10 RX ORDER — OXYCODONE HYDROCHLORIDE 10 MG/1
10 TABLET ORAL EVERY 4 HOURS PRN
Refills: 0 | Status: DISCONTINUED | OUTPATIENT
Start: 2025-05-10 | End: 2025-05-15 | Stop reason: HOSPADM

## 2025-05-10 RX ORDER — HYDROMORPHONE HYDROCHLORIDE 2 MG/ML
2 INJECTION, SOLUTION INTRAMUSCULAR; INTRAVENOUS; SUBCUTANEOUS EVERY 4 HOURS PRN
Status: DISCONTINUED | OUTPATIENT
Start: 2025-05-10 | End: 2025-05-11

## 2025-05-10 RX ORDER — TIZANIDINE 2 MG/1
6 TABLET ORAL EVERY 8 HOURS
Status: DISCONTINUED | OUTPATIENT
Start: 2025-05-10 | End: 2025-05-15 | Stop reason: HOSPADM

## 2025-05-10 RX ADMIN — HYDROMORPHONE HYDROCHLORIDE 3 MG: 2 INJECTION INTRAMUSCULAR; INTRAVENOUS; SUBCUTANEOUS at 06:05

## 2025-05-10 RX ADMIN — DIPHENHYDRAMINE HYDROCHLORIDE 50 MG: 50 INJECTION, SOLUTION INTRAMUSCULAR; INTRAVENOUS at 10:05

## 2025-05-10 RX ADMIN — LACTULOSE 20 G: 20 SOLUTION ORAL at 10:05

## 2025-05-10 RX ADMIN — ACETAMINOPHEN 1000 MG: 500 TABLET, FILM COATED ORAL at 06:05

## 2025-05-10 RX ADMIN — SENNOSIDES AND DOCUSATE SODIUM 1 TABLET: 50; 8.6 TABLET ORAL at 09:05

## 2025-05-10 RX ADMIN — TIZANIDINE 4 MG: 4 TABLET ORAL at 06:05

## 2025-05-10 RX ADMIN — GABAPENTIN 900 MG: 300 CAPSULE ORAL at 09:05

## 2025-05-10 RX ADMIN — HYDROMORPHONE HYDROCHLORIDE 3 MG: 2 INJECTION INTRAMUSCULAR; INTRAVENOUS; SUBCUTANEOUS at 05:05

## 2025-05-10 RX ADMIN — DIPHENHYDRAMINE HYDROCHLORIDE 50 MG: 50 INJECTION, SOLUTION INTRAMUSCULAR; INTRAVENOUS at 06:05

## 2025-05-10 RX ADMIN — FLUOXETINE HYDROCHLORIDE 80 MG: 20 CAPSULE ORAL at 10:05

## 2025-05-10 RX ADMIN — MUPIROCIN: 20 OINTMENT TOPICAL at 09:05

## 2025-05-10 RX ADMIN — HYDROXYZINE HYDROCHLORIDE 50 MG: 25 TABLET ORAL at 10:05

## 2025-05-10 RX ADMIN — WARFARIN SODIUM 2.5 MG: 2.5 TABLET ORAL at 05:05

## 2025-05-10 RX ADMIN — FOLIC ACID 1 MG: 1 TABLET ORAL at 10:05

## 2025-05-10 RX ADMIN — DIPHENHYDRAMINE HYDROCHLORIDE 50 MG: 50 INJECTION, SOLUTION INTRAMUSCULAR; INTRAVENOUS at 02:05

## 2025-05-10 RX ADMIN — HYDROMORPHONE HYDROCHLORIDE 3 MG: 2 INJECTION INTRAMUSCULAR; INTRAVENOUS; SUBCUTANEOUS at 02:05

## 2025-05-10 RX ADMIN — TIZANIDINE 6 MG: 2 TABLET ORAL at 09:05

## 2025-05-10 RX ADMIN — DIPHENHYDRAMINE HYDROCHLORIDE 50 MG: 50 INJECTION, SOLUTION INTRAMUSCULAR; INTRAVENOUS at 05:05

## 2025-05-10 RX ADMIN — SODIUM CHLORIDE, POTASSIUM CHLORIDE, SODIUM LACTATE AND CALCIUM CHLORIDE: 600; 310; 30; 20 INJECTION, SOLUTION INTRAVENOUS at 06:05

## 2025-05-10 RX ADMIN — SODIUM CHLORIDE, POTASSIUM CHLORIDE, SODIUM LACTATE AND CALCIUM CHLORIDE: 600; 310; 30; 20 INJECTION, SOLUTION INTRAVENOUS at 10:05

## 2025-05-10 RX ADMIN — SENNOSIDES AND DOCUSATE SODIUM 1 TABLET: 50; 8.6 TABLET ORAL at 10:05

## 2025-05-10 RX ADMIN — PANTOPRAZOLE SODIUM 40 MG: 40 TABLET, DELAYED RELEASE ORAL at 10:05

## 2025-05-10 RX ADMIN — ACETAMINOPHEN 1000 MG: 500 TABLET, FILM COATED ORAL at 01:05

## 2025-05-10 RX ADMIN — ACETAMINOPHEN 1000 MG: 500 TABLET, FILM COATED ORAL at 09:05

## 2025-05-10 RX ADMIN — AMLODIPINE BESYLATE 10 MG: 5 TABLET ORAL at 10:05

## 2025-05-10 RX ADMIN — MORPHINE SULFATE 30 MG: 15 TABLET, FILM COATED, EXTENDED RELEASE ORAL at 06:05

## 2025-05-10 RX ADMIN — PRAZOSIN HYDROCHLORIDE 1 MG: 1 CAPSULE ORAL at 09:05

## 2025-05-10 RX ADMIN — MORPHINE SULFATE 30 MG: 15 TABLET, FILM COATED, EXTENDED RELEASE ORAL at 09:05

## 2025-05-10 RX ADMIN — GABAPENTIN 900 MG: 300 CAPSULE ORAL at 01:05

## 2025-05-10 RX ADMIN — GABAPENTIN 900 MG: 300 CAPSULE ORAL at 06:05

## 2025-05-10 RX ADMIN — HYDROMORPHONE HYDROCHLORIDE 3 MG: 2 INJECTION INTRAMUSCULAR; INTRAVENOUS; SUBCUTANEOUS at 10:05

## 2025-05-10 RX ADMIN — TIZANIDINE 6 MG: 2 TABLET ORAL at 01:05

## 2025-05-10 RX ADMIN — HYDROMORPHONE HYDROCHLORIDE 2 MG: 2 INJECTION INTRAMUSCULAR; INTRAVENOUS; SUBCUTANEOUS at 09:05

## 2025-05-10 RX ADMIN — MORPHINE SULFATE 30 MG: 15 TABLET, FILM COATED, EXTENDED RELEASE ORAL at 01:05

## 2025-05-10 NOTE — PLAN OF CARE
Problem: Gas Exchange Impaired  Goal: Optimal Gas Exchange  Outcome: Progressing  Intervention: Optimize Oxygenation and Ventilation  Flowsheets (Taken 5/10/2025 2173)  Airway/Ventilation Management: airway patency maintained  Head of Bed (HOB) Positioning: HOB elevated   Patient in no apparent distress. Sat's 98  % on room air. PRN treatments not needed . Will continue to monitor.
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
## 2025-05-10 NOTE — PLAN OF CARE
Problem: Fall Injury Risk  Goal: Absence of Fall and Fall-Related Injury  Outcome: Progressing     Problem: Pain Acute  Goal: Optimal Pain Control and Function  Outcome: Progressing     Patient AAOx4. NADN.   During shift: pain management, LR @ 75 mL/hr.   Monitor labs.   POC reviewed with patient, questions answered, concerns acknowledged.  Safety measures in place, call light within reach.

## 2025-05-10 NOTE — ASSESSMENT & PLAN NOTE
- Sickle cell anemia with pain and chronic opiate use for symptom control.  - Continue adjunct pain control with acetaminophen 1000mg PO q8hr, gabapentin at 900mg PO q8hr.  - Continue morphine 30mg PO q8hr, hydrocodone-acetaminophen 10-325mg PO q6hr PRN, hydromorphone at 2mg IV q4hr PRN, diphenhydramine as 50mg IV q4hr PRN. Add tizanidine at 6mg PO TID.  - Continue IVFs with LR.  - Monitor symptoms and adjust as required.  - Reports outpatient follow-up scheduled for 05/20; may need further exchange transfusions in outpatient setting, last in record appear to be in 2024.

## 2025-05-10 NOTE — SUBJECTIVE & OBJECTIVE
Interval History: No acute events overnight. Reports pain comparable to yesterday. Mostly located to L neck / upper anterior chest wall.    Review of Systems   Constitutional:  Negative for chills and fever.   Respiratory:  Negative for cough and shortness of breath.    Cardiovascular:  Negative for chest pain and palpitations.   Gastrointestinal:  Negative for abdominal pain, nausea and vomiting.   Musculoskeletal:  Positive for arthralgias and myalgias.     Objective:     Vital Signs (Most Recent):  Temp: 98.5 °F (36.9 °C) (05/10/25 1125)  Pulse: 92 (05/10/25 1125)  Resp: 16 (05/10/25 1125)  BP: 134/72 (05/10/25 1125)  SpO2: (!) 94 % (05/10/25 1125) Vital Signs (24h Range):  Temp:  [97.7 °F (36.5 °C)-98.7 °F (37.1 °C)] 98.5 °F (36.9 °C)  Pulse:  [68-92] 92  Resp:  [16-18] 16  SpO2:  [94 %-99 %] 94 %  BP: (127-134)/(60-88) 134/72     Weight: 112.4 kg (247 lb 12.8 oz)  Body mass index is 45.32 kg/m².    Intake/Output Summary (Last 24 hours) at 5/10/2025 1258  Last data filed at 5/10/2025 0540  Gross per 24 hour   Intake --   Output 4500 ml   Net -4500 ml         Physical Exam  Vitals and nursing note reviewed.   Constitutional:       General: She is not in acute distress.     Appearance: She is well-developed.   HENT:      Head: Normocephalic and atraumatic.   Eyes:      General:         Right eye: No discharge.         Left eye: No discharge.      Conjunctiva/sclera: Conjunctivae normal.   Cardiovascular:      Rate and Rhythm: Normal rate.      Pulses: Normal pulses.   Pulmonary:      Effort: Pulmonary effort is normal. No respiratory distress.   Abdominal:      Palpations: Abdomen is soft.      Tenderness: There is no abdominal tenderness.   Musculoskeletal:         General: Tenderness present. Normal range of motion.      Right lower leg: No edema.      Left lower leg: No edema.   Skin:     General: Skin is warm and dry.   Neurological:      Mental Status: She is alert and oriented to person, place, and time.         Significant Labs:   CBC:  Recent Labs   Lab 05/08/25  0450 05/10/25  0642   WBC 7.05  --    HGB 9.8* 9.8*   HCT 30.2* 29.9*     --    LYMPH 3.15  44.7  --    MONO 11.6  0.82  --    EOS 3.8  0.27  --       Significant Imaging: I have reviewed and interpreted all pertinent imaging results/findings within the past 24 hours.

## 2025-05-11 LAB
HCT VFR BLD AUTO: 29.1 % (ref 37–48.5)
HGB BLD-MCNC: 9.4 GM/DL (ref 12–16)
INR PPP: 1.6 (ref 0.8–1.2)
PROTHROMBIN TIME: 17.2 SECONDS (ref 9–12.5)

## 2025-05-11 PROCEDURE — 21400001 HC TELEMETRY ROOM

## 2025-05-11 PROCEDURE — 25000003 PHARM REV CODE 250: Performed by: INTERNAL MEDICINE

## 2025-05-11 PROCEDURE — 85018 HEMOGLOBIN: CPT | Performed by: INTERNAL MEDICINE

## 2025-05-11 PROCEDURE — 63600175 PHARM REV CODE 636 W HCPCS: Performed by: INTERNAL MEDICINE

## 2025-05-11 PROCEDURE — 36415 COLL VENOUS BLD VENIPUNCTURE: CPT | Performed by: INTERNAL MEDICINE

## 2025-05-11 PROCEDURE — 25000003 PHARM REV CODE 250: Performed by: PHYSICIAN ASSISTANT

## 2025-05-11 PROCEDURE — 85014 HEMATOCRIT: CPT | Performed by: INTERNAL MEDICINE

## 2025-05-11 PROCEDURE — 94761 N-INVAS EAR/PLS OXIMETRY MLT: CPT

## 2025-05-11 PROCEDURE — 85610 PROTHROMBIN TIME: CPT | Performed by: INTERNAL MEDICINE

## 2025-05-11 RX ORDER — HYDROMORPHONE HYDROCHLORIDE 2 MG/ML
3 INJECTION, SOLUTION INTRAMUSCULAR; INTRAVENOUS; SUBCUTANEOUS EVERY 4 HOURS PRN
Status: DISCONTINUED | OUTPATIENT
Start: 2025-05-11 | End: 2025-05-15 | Stop reason: HOSPADM

## 2025-05-11 RX ADMIN — DIPHENHYDRAMINE HYDROCHLORIDE 50 MG: 50 INJECTION, SOLUTION INTRAMUSCULAR; INTRAVENOUS at 06:05

## 2025-05-11 RX ADMIN — MORPHINE SULFATE 30 MG: 15 TABLET, FILM COATED, EXTENDED RELEASE ORAL at 02:05

## 2025-05-11 RX ADMIN — HYDROXYZINE HYDROCHLORIDE 50 MG: 25 TABLET ORAL at 09:05

## 2025-05-11 RX ADMIN — TIZANIDINE 6 MG: 2 TABLET ORAL at 09:05

## 2025-05-11 RX ADMIN — ACETAMINOPHEN 1000 MG: 500 TABLET, FILM COATED ORAL at 06:05

## 2025-05-11 RX ADMIN — FOLIC ACID 1 MG: 1 TABLET ORAL at 08:05

## 2025-05-11 RX ADMIN — TIZANIDINE 6 MG: 2 TABLET ORAL at 02:05

## 2025-05-11 RX ADMIN — HYDROMORPHONE HYDROCHLORIDE 2 MG: 2 INJECTION INTRAMUSCULAR; INTRAVENOUS; SUBCUTANEOUS at 02:05

## 2025-05-11 RX ADMIN — PANTOPRAZOLE SODIUM 40 MG: 40 TABLET, DELAYED RELEASE ORAL at 08:05

## 2025-05-11 RX ADMIN — DIPHENHYDRAMINE HYDROCHLORIDE 50 MG: 50 INJECTION, SOLUTION INTRAMUSCULAR; INTRAVENOUS at 02:05

## 2025-05-11 RX ADMIN — DIPHENHYDRAMINE HYDROCHLORIDE 50 MG: 50 INJECTION, SOLUTION INTRAMUSCULAR; INTRAVENOUS at 09:05

## 2025-05-11 RX ADMIN — WARFARIN SODIUM 2.5 MG: 2.5 TABLET ORAL at 04:05

## 2025-05-11 RX ADMIN — GABAPENTIN 900 MG: 300 CAPSULE ORAL at 09:05

## 2025-05-11 RX ADMIN — SENNOSIDES AND DOCUSATE SODIUM 1 TABLET: 50; 8.6 TABLET ORAL at 09:05

## 2025-05-11 RX ADMIN — AMLODIPINE BESYLATE 10 MG: 5 TABLET ORAL at 08:05

## 2025-05-11 RX ADMIN — GABAPENTIN 900 MG: 300 CAPSULE ORAL at 06:05

## 2025-05-11 RX ADMIN — SODIUM CHLORIDE, POTASSIUM CHLORIDE, SODIUM LACTATE AND CALCIUM CHLORIDE: 600; 310; 30; 20 INJECTION, SOLUTION INTRAVENOUS at 11:05

## 2025-05-11 RX ADMIN — HYDROMORPHONE HYDROCHLORIDE 3 MG: 2 INJECTION INTRAMUSCULAR; INTRAVENOUS; SUBCUTANEOUS at 12:05

## 2025-05-11 RX ADMIN — ACETAMINOPHEN 1000 MG: 500 TABLET, FILM COATED ORAL at 09:05

## 2025-05-11 RX ADMIN — HYDROMORPHONE HYDROCHLORIDE 3 MG: 2 INJECTION INTRAMUSCULAR; INTRAVENOUS; SUBCUTANEOUS at 03:05

## 2025-05-11 RX ADMIN — MORPHINE SULFATE 30 MG: 15 TABLET, FILM COATED, EXTENDED RELEASE ORAL at 06:05

## 2025-05-11 RX ADMIN — TIZANIDINE 6 MG: 2 TABLET ORAL at 06:05

## 2025-05-11 RX ADMIN — LACTULOSE 20 G: 20 SOLUTION ORAL at 08:05

## 2025-05-11 RX ADMIN — GABAPENTIN 900 MG: 300 CAPSULE ORAL at 02:05

## 2025-05-11 RX ADMIN — DIPHENHYDRAMINE HYDROCHLORIDE 50 MG: 50 INJECTION, SOLUTION INTRAMUSCULAR; INTRAVENOUS at 03:05

## 2025-05-11 RX ADMIN — HYDROMORPHONE HYDROCHLORIDE 2 MG: 2 INJECTION INTRAMUSCULAR; INTRAVENOUS; SUBCUTANEOUS at 06:05

## 2025-05-11 RX ADMIN — MORPHINE SULFATE 30 MG: 15 TABLET, FILM COATED, EXTENDED RELEASE ORAL at 09:05

## 2025-05-11 RX ADMIN — HYDROMORPHONE HYDROCHLORIDE 3 MG: 2 INJECTION INTRAMUSCULAR; INTRAVENOUS; SUBCUTANEOUS at 09:05

## 2025-05-11 RX ADMIN — PRAZOSIN HYDROCHLORIDE 1 MG: 1 CAPSULE ORAL at 09:05

## 2025-05-11 RX ADMIN — ACETAMINOPHEN 1000 MG: 500 TABLET, FILM COATED ORAL at 02:05

## 2025-05-11 RX ADMIN — DIPHENHYDRAMINE HYDROCHLORIDE 50 MG: 50 INJECTION, SOLUTION INTRAMUSCULAR; INTRAVENOUS at 12:05

## 2025-05-11 RX ADMIN — HYDROMORPHONE HYDROCHLORIDE 2 MG: 2 INJECTION INTRAMUSCULAR; INTRAVENOUS; SUBCUTANEOUS at 09:05

## 2025-05-11 RX ADMIN — FLUOXETINE HYDROCHLORIDE 80 MG: 20 CAPSULE ORAL at 08:05

## 2025-05-11 NOTE — ASSESSMENT & PLAN NOTE
- Sickle cell anemia with pain and chronic opiate use for symptom control.  - Continue adjunct pain control with acetaminophen 1000mg PO q8hr, gabapentin at 900mg PO q8hr.  - Continue morphine 30mg PO q8hr, oxycodone 10mg PO q4hr PRN, hydromorphone at 3mg IV q6hr PRN, diphenhydramine 50mg IV q4hr PRN. Continue tizanidine at 6mg PO TID.  - Continue IVFs with LR.  - Monitor symptoms and adjust as required.  - Reports outpatient follow-up scheduled for 05/20; may need further exchange transfusions in outpatient setting, last in record appear to be in 2024.  - Notes increased LUE swelling predominantly upper portion of arm. Check U/S soft tissue / venous to evaluate for fluid collection or thrombosis.

## 2025-05-11 NOTE — SUBJECTIVE & OBJECTIVE
Interval History: No acute events overnight. Reports pain comparable; would prefer to adjust timing over dosing. Reports LUE feeling more prominently swollen.    Review of Systems   Constitutional:  Negative for chills and fever.   Respiratory:  Negative for cough and shortness of breath.    Cardiovascular:  Negative for chest pain and palpitations.   Gastrointestinal:  Negative for abdominal pain, nausea and vomiting.   Musculoskeletal:  Positive for arthralgias and myalgias.     Objective:     Vital Signs (Most Recent):  Temp: 98.7 °F (37.1 °C) (05/11/25 1200)  Pulse: 90 (05/11/25 1200)  Resp: 16 (05/11/25 1415)  BP: 126/76 (05/11/25 1200)  SpO2: 96 % (05/11/25 1200) Vital Signs (24h Range):  Temp:  [98.2 °F (36.8 °C)-98.9 °F (37.2 °C)] 98.7 °F (37.1 °C)  Pulse:  [69-90] 90  Resp:  [12-20] 16  SpO2:  [94 %-99 %] 96 %  BP: (119-144)/(69-86) 126/76     Weight: 112.4 kg (247 lb 12.8 oz)  Body mass index is 45.32 kg/m².    Intake/Output Summary (Last 24 hours) at 5/11/2025 1540  Last data filed at 5/11/2025 0509  Gross per 24 hour   Intake --   Output 800 ml   Net -800 ml         Physical Exam  Vitals and nursing note reviewed.   Constitutional:       General: She is not in acute distress.     Appearance: She is well-developed.   HENT:      Head: Normocephalic and atraumatic.   Eyes:      General:         Right eye: No discharge.         Left eye: No discharge.      Conjunctiva/sclera: Conjunctivae normal.   Cardiovascular:      Rate and Rhythm: Normal rate.      Pulses: Normal pulses.   Pulmonary:      Effort: Pulmonary effort is normal. No respiratory distress.   Abdominal:      Palpations: Abdomen is soft.      Tenderness: There is no abdominal tenderness.   Musculoskeletal:         General: Tenderness present. Normal range of motion.      Right lower leg: No edema.      Left lower leg: No edema.   Skin:     General: Skin is warm and dry.   Neurological:      Mental Status: She is alert and oriented to person,  place, and time.        Significant Labs:   CBC:  Recent Labs   Lab 05/10/25  0642 05/11/25  0606   HGB 9.8* 9.4*   HCT 29.9* 29.1*      Significant Imaging: I have reviewed and interpreted all pertinent imaging results/findings within the past 24 hours.
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
## 2025-05-12 LAB
HCT VFR BLD AUTO: 26.6 % (ref 37–48.5)
HGB BLD-MCNC: 8.7 GM/DL (ref 12–16)
INR PPP: 1.5 (ref 0.8–1.2)
PLATELET # BLD AUTO: 304 K/UL (ref 150–450)
PMV BLD AUTO: 9 FL (ref 9.2–12.9)
PROTHROMBIN TIME: 16.8 SECONDS (ref 9–12.5)

## 2025-05-12 PROCEDURE — 25000003 PHARM REV CODE 250: Performed by: PHYSICIAN ASSISTANT

## 2025-05-12 PROCEDURE — 85014 HEMATOCRIT: CPT | Performed by: INTERNAL MEDICINE

## 2025-05-12 PROCEDURE — 85049 AUTOMATED PLATELET COUNT: CPT | Performed by: INTERNAL MEDICINE

## 2025-05-12 PROCEDURE — 36415 COLL VENOUS BLD VENIPUNCTURE: CPT | Performed by: INTERNAL MEDICINE

## 2025-05-12 PROCEDURE — 63600175 PHARM REV CODE 636 W HCPCS: Performed by: INTERNAL MEDICINE

## 2025-05-12 PROCEDURE — 21400001 HC TELEMETRY ROOM

## 2025-05-12 PROCEDURE — 85018 HEMOGLOBIN: CPT | Performed by: INTERNAL MEDICINE

## 2025-05-12 PROCEDURE — 25000003 PHARM REV CODE 250: Performed by: INTERNAL MEDICINE

## 2025-05-12 PROCEDURE — 85610 PROTHROMBIN TIME: CPT | Performed by: INTERNAL MEDICINE

## 2025-05-12 RX ADMIN — HYDROMORPHONE HYDROCHLORIDE 3 MG: 2 INJECTION INTRAMUSCULAR; INTRAVENOUS; SUBCUTANEOUS at 01:05

## 2025-05-12 RX ADMIN — HYDROXYZINE HYDROCHLORIDE 50 MG: 25 TABLET ORAL at 01:05

## 2025-05-12 RX ADMIN — HYDROMORPHONE HYDROCHLORIDE 3 MG: 2 INJECTION INTRAMUSCULAR; INTRAVENOUS; SUBCUTANEOUS at 05:05

## 2025-05-12 RX ADMIN — PANTOPRAZOLE SODIUM 40 MG: 40 TABLET, DELAYED RELEASE ORAL at 09:05

## 2025-05-12 RX ADMIN — WARFARIN SODIUM 2.5 MG: 2.5 TABLET ORAL at 05:05

## 2025-05-12 RX ADMIN — GABAPENTIN 900 MG: 300 CAPSULE ORAL at 05:05

## 2025-05-12 RX ADMIN — FLUOXETINE HYDROCHLORIDE 80 MG: 20 CAPSULE ORAL at 09:05

## 2025-05-12 RX ADMIN — MORPHINE SULFATE 30 MG: 15 TABLET, FILM COATED, EXTENDED RELEASE ORAL at 05:05

## 2025-05-12 RX ADMIN — HYDROMORPHONE HYDROCHLORIDE 3 MG: 2 INJECTION INTRAMUSCULAR; INTRAVENOUS; SUBCUTANEOUS at 09:05

## 2025-05-12 RX ADMIN — GABAPENTIN 900 MG: 300 CAPSULE ORAL at 09:05

## 2025-05-12 RX ADMIN — ACETAMINOPHEN 1000 MG: 500 TABLET, FILM COATED ORAL at 09:05

## 2025-05-12 RX ADMIN — SENNOSIDES AND DOCUSATE SODIUM 1 TABLET: 50; 8.6 TABLET ORAL at 09:05

## 2025-05-12 RX ADMIN — MORPHINE SULFATE 30 MG: 15 TABLET, FILM COATED, EXTENDED RELEASE ORAL at 09:05

## 2025-05-12 RX ADMIN — PRAZOSIN HYDROCHLORIDE 1 MG: 1 CAPSULE ORAL at 09:05

## 2025-05-12 RX ADMIN — HYDROXYZINE HYDROCHLORIDE 50 MG: 25 TABLET ORAL at 03:05

## 2025-05-12 RX ADMIN — TIZANIDINE 6 MG: 2 TABLET ORAL at 05:05

## 2025-05-12 RX ADMIN — TIZANIDINE 6 MG: 2 TABLET ORAL at 01:05

## 2025-05-12 RX ADMIN — MORPHINE SULFATE 30 MG: 15 TABLET, FILM COATED, EXTENDED RELEASE ORAL at 01:05

## 2025-05-12 RX ADMIN — SODIUM CHLORIDE, POTASSIUM CHLORIDE, SODIUM LACTATE AND CALCIUM CHLORIDE: 600; 310; 30; 20 INJECTION, SOLUTION INTRAVENOUS at 01:05

## 2025-05-12 RX ADMIN — DIPHENHYDRAMINE HYDROCHLORIDE 50 MG: 50 INJECTION, SOLUTION INTRAMUSCULAR; INTRAVENOUS at 05:05

## 2025-05-12 RX ADMIN — ACETAMINOPHEN 1000 MG: 500 TABLET, FILM COATED ORAL at 05:05

## 2025-05-12 RX ADMIN — ACETAMINOPHEN 1000 MG: 500 TABLET, FILM COATED ORAL at 01:05

## 2025-05-12 RX ADMIN — DIPHENHYDRAMINE HYDROCHLORIDE 50 MG: 50 INJECTION, SOLUTION INTRAMUSCULAR; INTRAVENOUS at 01:05

## 2025-05-12 RX ADMIN — OXYCODONE HYDROCHLORIDE 10 MG: 10 TABLET ORAL at 03:05

## 2025-05-12 RX ADMIN — LACTULOSE 20 G: 20 SOLUTION ORAL at 09:05

## 2025-05-12 RX ADMIN — DIPHENHYDRAMINE HYDROCHLORIDE 50 MG: 50 INJECTION, SOLUTION INTRAMUSCULAR; INTRAVENOUS at 09:05

## 2025-05-12 RX ADMIN — AMLODIPINE BESYLATE 10 MG: 5 TABLET ORAL at 09:05

## 2025-05-12 RX ADMIN — FOLIC ACID 1 MG: 1 TABLET ORAL at 09:05

## 2025-05-12 RX ADMIN — TIZANIDINE 6 MG: 2 TABLET ORAL at 09:05

## 2025-05-12 RX ADMIN — GABAPENTIN 900 MG: 300 CAPSULE ORAL at 01:05

## 2025-05-12 NOTE — PLAN OF CARE
-patient asleep in bed, awakened to discuss discharge disposition and planning, patient voiced she continues with sickle cell crisis, MD to discuss medication weaning   05/12/25 1028   Discharge Reassessment   Assessment Type Discharge Planning Reassessment   Did the patient's condition or plan change since previous assessment? No   Discharge Plan discussed with: Patient   Communicated ALYSE with patient/caregiver Date not available/Unable to determine   Discharge Plan A Home with family   Discharge Plan B Home   DME Needed Upon Discharge  none   Transition of Care Barriers None   Why the patient remains in the hospital Requires continued medical care   Post-Acute Status   Discharge Delays None known at this time

## 2025-05-12 NOTE — SUBJECTIVE & OBJECTIVE
Interval History: No acute events overnight. Reports pain remains comparable, predominantly to anterior upper chest wall and L neck.    Review of Systems   Constitutional:  Negative for chills and fever.   Respiratory:  Negative for cough and shortness of breath.    Cardiovascular:  Negative for chest pain and palpitations.   Gastrointestinal:  Negative for abdominal pain, nausea and vomiting.   Musculoskeletal:  Positive for arthralgias and myalgias.     Objective:     Vital Signs (Most Recent):  Temp: 97.8 °F (36.6 °C) (05/12/25 1138)  Pulse: 71 (05/12/25 1138)  Resp: 18 (05/12/25 1308)  BP: 107/77 (05/12/25 1138)  SpO2: (!) 94 % (05/12/25 1138) Vital Signs (24h Range):  Temp:  [97.7 °F (36.5 °C)-98.6 °F (37 °C)] 97.8 °F (36.6 °C)  Pulse:  [66-90] 71  Resp:  [16-20] 18  SpO2:  [94 %-98 %] 94 %  BP: (107-142)/(68-83) 107/77     Weight: 112.4 kg (247 lb 12.8 oz)  Body mass index is 45.32 kg/m².    Intake/Output Summary (Last 24 hours) at 5/12/2025 1404  Last data filed at 5/11/2025 2240  Gross per 24 hour   Intake 1355 ml   Output --   Net 1355 ml         Physical Exam  Vitals and nursing note reviewed.   Constitutional:       General: She is not in acute distress.     Appearance: She is well-developed.   HENT:      Head: Normocephalic and atraumatic.   Eyes:      General:         Right eye: No discharge.         Left eye: No discharge.      Conjunctiva/sclera: Conjunctivae normal.   Cardiovascular:      Rate and Rhythm: Normal rate.      Pulses: Normal pulses.   Pulmonary:      Effort: Pulmonary effort is normal. No respiratory distress.   Abdominal:      Palpations: Abdomen is soft.      Tenderness: There is no abdominal tenderness.   Musculoskeletal:         General: Tenderness present. Normal range of motion.      Right lower leg: No edema.      Left lower leg: No edema.   Skin:     General: Skin is warm and dry.   Neurological:      Mental Status: She is alert and oriented to person, place, and time.         Significant Labs:   CBC:  Recent Labs   Lab 05/10/25  0642 05/11/25  0606 05/12/25  0623   HGB 9.8* 9.4* 8.7*   HCT 29.9* 29.1* 26.6*   PLT  --   --  304      Significant Imaging: I have reviewed and interpreted all pertinent imaging results/findings within the past 24 hours.
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
## 2025-05-12 NOTE — PLAN OF CARE
Problem: Fall Injury Risk  Goal: Absence of Fall and Fall-Related Injury  Outcome: Progressing     Problem: Pain Acute  Goal: Optimal Pain Control and Function  Outcome: Progressing

## 2025-05-12 NOTE — HOSPITAL COURSE
Admitted with sickle cell pain and started IVFs, adjunct and narcotic pain control. Symptoms improved gradually but reported significant LUE swelling, L neck and L anterior upper chest wall tenderness. Imaging performed without noted acute abnormality. Pain control improved and H/H remained stable. Patient was stable for discharge with follow up with PCP and Hematologist. Patient reported being out of her outpatient regimen for pain control. Reached out to her Hematologists, and patient will be new to Dr. Soni and has not established care yet; transferring care from Dr. Clark and he approved dispensing 1 week supply of pain medications to carry patient to her appointment with Dr. Soni which was schedule for 5/20/2025.

## 2025-05-13 LAB
HCT VFR BLD AUTO: 26.5 % (ref 37–48.5)
HGB BLD-MCNC: 8.8 GM/DL (ref 12–16)
INR PPP: 1.6 (ref 0.8–1.2)
PROTHROMBIN TIME: 17.2 SECONDS (ref 9–12.5)

## 2025-05-13 PROCEDURE — 25000003 PHARM REV CODE 250: Performed by: INTERNAL MEDICINE

## 2025-05-13 PROCEDURE — 85014 HEMATOCRIT: CPT | Performed by: INTERNAL MEDICINE

## 2025-05-13 PROCEDURE — 63600175 PHARM REV CODE 636 W HCPCS: Performed by: INTERNAL MEDICINE

## 2025-05-13 PROCEDURE — 85610 PROTHROMBIN TIME: CPT | Performed by: INTERNAL MEDICINE

## 2025-05-13 PROCEDURE — 36415 COLL VENOUS BLD VENIPUNCTURE: CPT | Performed by: INTERNAL MEDICINE

## 2025-05-13 PROCEDURE — 99900035 HC TECH TIME PER 15 MIN (STAT)

## 2025-05-13 PROCEDURE — 25000003 PHARM REV CODE 250: Performed by: PHYSICIAN ASSISTANT

## 2025-05-13 PROCEDURE — 21400001 HC TELEMETRY ROOM

## 2025-05-13 PROCEDURE — 94761 N-INVAS EAR/PLS OXIMETRY MLT: CPT

## 2025-05-13 PROCEDURE — 85018 HEMOGLOBIN: CPT | Performed by: INTERNAL MEDICINE

## 2025-05-13 RX ADMIN — HYDROMORPHONE HYDROCHLORIDE 3 MG: 2 INJECTION INTRAMUSCULAR; INTRAVENOUS; SUBCUTANEOUS at 04:05

## 2025-05-13 RX ADMIN — HYDROMORPHONE HYDROCHLORIDE 3 MG: 2 INJECTION INTRAMUSCULAR; INTRAVENOUS; SUBCUTANEOUS at 01:05

## 2025-05-13 RX ADMIN — DIPHENHYDRAMINE HYDROCHLORIDE 50 MG: 50 INJECTION, SOLUTION INTRAMUSCULAR; INTRAVENOUS at 08:05

## 2025-05-13 RX ADMIN — GABAPENTIN 900 MG: 300 CAPSULE ORAL at 05:05

## 2025-05-13 RX ADMIN — WARFARIN SODIUM 2.5 MG: 2.5 TABLET ORAL at 04:05

## 2025-05-13 RX ADMIN — SODIUM CHLORIDE, POTASSIUM CHLORIDE, SODIUM LACTATE AND CALCIUM CHLORIDE: 600; 310; 30; 20 INJECTION, SOLUTION INTRAVENOUS at 01:05

## 2025-05-13 RX ADMIN — GABAPENTIN 900 MG: 300 CAPSULE ORAL at 10:05

## 2025-05-13 RX ADMIN — MORPHINE SULFATE 30 MG: 15 TABLET, FILM COATED, EXTENDED RELEASE ORAL at 02:05

## 2025-05-13 RX ADMIN — HYDROMORPHONE HYDROCHLORIDE 3 MG: 2 INJECTION INTRAMUSCULAR; INTRAVENOUS; SUBCUTANEOUS at 09:05

## 2025-05-13 RX ADMIN — ACETAMINOPHEN 1000 MG: 500 TABLET, FILM COATED ORAL at 10:05

## 2025-05-13 RX ADMIN — PANTOPRAZOLE SODIUM 40 MG: 40 TABLET, DELAYED RELEASE ORAL at 09:05

## 2025-05-13 RX ADMIN — MORPHINE SULFATE 30 MG: 15 TABLET, FILM COATED, EXTENDED RELEASE ORAL at 10:05

## 2025-05-13 RX ADMIN — GABAPENTIN 900 MG: 300 CAPSULE ORAL at 02:05

## 2025-05-13 RX ADMIN — DIPHENHYDRAMINE HYDROCHLORIDE 50 MG: 50 INJECTION, SOLUTION INTRAMUSCULAR; INTRAVENOUS at 12:05

## 2025-05-13 RX ADMIN — AMLODIPINE BESYLATE 10 MG: 5 TABLET ORAL at 09:05

## 2025-05-13 RX ADMIN — FOLIC ACID 1 MG: 1 TABLET ORAL at 09:05

## 2025-05-13 RX ADMIN — SENNOSIDES AND DOCUSATE SODIUM 1 TABLET: 50; 8.6 TABLET ORAL at 08:05

## 2025-05-13 RX ADMIN — ACETAMINOPHEN 1000 MG: 500 TABLET, FILM COATED ORAL at 05:05

## 2025-05-13 RX ADMIN — TIZANIDINE 6 MG: 2 TABLET ORAL at 10:05

## 2025-05-13 RX ADMIN — MORPHINE SULFATE 30 MG: 15 TABLET, FILM COATED, EXTENDED RELEASE ORAL at 05:05

## 2025-05-13 RX ADMIN — DIPHENHYDRAMINE HYDROCHLORIDE 50 MG: 50 INJECTION, SOLUTION INTRAMUSCULAR; INTRAVENOUS at 05:05

## 2025-05-13 RX ADMIN — OXYCODONE HYDROCHLORIDE 10 MG: 10 TABLET ORAL at 02:05

## 2025-05-13 RX ADMIN — ACETAMINOPHEN 1000 MG: 500 TABLET, FILM COATED ORAL at 02:05

## 2025-05-13 RX ADMIN — HYDROXYZINE HYDROCHLORIDE 50 MG: 25 TABLET ORAL at 02:05

## 2025-05-13 RX ADMIN — HYDROMORPHONE HYDROCHLORIDE 3 MG: 2 INJECTION INTRAMUSCULAR; INTRAVENOUS; SUBCUTANEOUS at 12:05

## 2025-05-13 RX ADMIN — METHOCARBAMOL 500 MG: 500 TABLET ORAL at 02:05

## 2025-05-13 RX ADMIN — HYDROMORPHONE HYDROCHLORIDE 3 MG: 2 INJECTION INTRAMUSCULAR; INTRAVENOUS; SUBCUTANEOUS at 05:05

## 2025-05-13 RX ADMIN — TIZANIDINE 6 MG: 2 TABLET ORAL at 05:05

## 2025-05-13 RX ADMIN — FLUOXETINE HYDROCHLORIDE 80 MG: 20 CAPSULE ORAL at 09:05

## 2025-05-13 RX ADMIN — DIPHENHYDRAMINE HYDROCHLORIDE 50 MG: 50 INJECTION, SOLUTION INTRAMUSCULAR; INTRAVENOUS at 09:05

## 2025-05-13 RX ADMIN — TIZANIDINE 6 MG: 2 TABLET ORAL at 02:05

## 2025-05-13 RX ADMIN — DIPHENHYDRAMINE HYDROCHLORIDE 50 MG: 50 INJECTION, SOLUTION INTRAMUSCULAR; INTRAVENOUS at 04:05

## 2025-05-13 RX ADMIN — LACTULOSE 20 G: 20 SOLUTION ORAL at 09:05

## 2025-05-13 RX ADMIN — SENNOSIDES AND DOCUSATE SODIUM 1 TABLET: 50; 8.6 TABLET ORAL at 09:05

## 2025-05-13 RX ADMIN — PRAZOSIN HYDROCHLORIDE 1 MG: 1 CAPSULE ORAL at 08:05

## 2025-05-13 RX ADMIN — HYDROMORPHONE HYDROCHLORIDE 3 MG: 2 INJECTION INTRAMUSCULAR; INTRAVENOUS; SUBCUTANEOUS at 08:05

## 2025-05-13 RX ADMIN — DIPHENHYDRAMINE HYDROCHLORIDE 50 MG: 50 INJECTION, SOLUTION INTRAMUSCULAR; INTRAVENOUS at 01:05

## 2025-05-13 NOTE — PLAN OF CARE
Problem: Adult Inpatient Plan of Care  Goal: Plan of Care Review  Outcome: Progressing  Goal: Patient-Specific Goal (Individualized)  Outcome: Progressing  Goal: Absence of Hospital-Acquired Illness or Injury  Outcome: Progressing  Goal: Optimal Comfort and Wellbeing  Outcome: Progressing     Problem: Infection  Goal: Absence of Infection Signs and Symptoms  Outcome: Progressing     Problem: Fall Injury Risk  Goal: Absence of Fall and Fall-Related Injury  Outcome: Progressing     Problem: Pain Acute  Goal: Optimal Pain Control and Function  Outcome: Progressing     POC reviewed with patient. All questions and concerns addressed. Fall/safety precautions implemented and maintained. IVF continued. Pain management provided. No acute events noted this shift. Please see flowsheet for full assessment and vitals. Bed locked in lowest position. Side rails up x2. Call bell within reach.

## 2025-05-13 NOTE — SUBJECTIVE & OBJECTIVE
Interval History: No acute events overnight. Reports pain is improving, predominantly to anterior upper chest wall and L neck.    Review of Systems   Constitutional:  Negative for chills and fever.   Respiratory:  Negative for cough and shortness of breath.    Cardiovascular:  Negative for chest pain and palpitations.   Gastrointestinal:  Negative for abdominal pain, nausea and vomiting.   Musculoskeletal:  Positive for arthralgias and myalgias.     Objective:     Vital Signs (Most Recent):  Temp: 97.9 °F (36.6 °C) (05/13/25 1138)  Pulse: 77 (05/13/25 1138)  Resp: 18 (05/13/25 1444)  BP: 127/80 (05/13/25 1138)  SpO2: 95 % (05/13/25 1138) Vital Signs (24h Range):  Temp:  [97.9 °F (36.6 °C)-99 °F (37.2 °C)] 97.9 °F (36.6 °C)  Pulse:  [63-95] 77  Resp:  [16-20] 18  SpO2:  [94 %-100 %] 95 %  BP: (106-150)/(63-81) 127/80     Weight: 112.4 kg (247 lb 12.8 oz)  Body mass index is 45.32 kg/m².    Intake/Output Summary (Last 24 hours) at 5/13/2025 1501  Last data filed at 5/13/2025 0614  Gross per 24 hour   Intake 2573.46 ml   Output 2000 ml   Net 573.46 ml         Physical Exam  Vitals and nursing note reviewed.   Constitutional:       General: She is not in acute distress.     Appearance: She is well-developed.   HENT:      Head: Normocephalic and atraumatic.   Eyes:      General:         Right eye: No discharge.         Left eye: No discharge.      Conjunctiva/sclera: Conjunctivae normal.   Cardiovascular:      Rate and Rhythm: Normal rate.      Pulses: Normal pulses.   Pulmonary:      Effort: Pulmonary effort is normal. No respiratory distress.   Abdominal:      Palpations: Abdomen is soft.      Tenderness: There is no abdominal tenderness.   Musculoskeletal:         General: Tenderness present. Normal range of motion.      Right lower leg: No edema.      Left lower leg: No edema.   Skin:     General: Skin is warm and dry.   Neurological:      Mental Status: She is alert and oriented to person, place, and time.         Significant Labs:   CBC:  Recent Labs   Lab 05/11/25  0606 05/12/25  0623 05/13/25  0348   HGB 9.4* 8.7* 8.8*   HCT 29.1* 26.6* 26.5*   PLT  --  304  --       Significant Imaging: I have reviewed and interpreted all pertinent imaging results/findings within the past 24 hours.
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
## 2025-05-13 NOTE — ASSESSMENT & PLAN NOTE
- Sickle cell anemia with pain and chronic opiate use for symptom control.  - Continue adjunct pain control with acetaminophen 1000mg PO q8hr, gabapentin at 900mg PO q8hr.  - Continue morphine 30mg PO q8hr, oxycodone 10mg PO q4hr PRN, hydromorphone at 3mg IV q6hr PRN, diphenhydramine 50mg IV q4hr PRN. Continue tizanidine at 6mg PO TID.  - Continue IVFs with LR.  - Monitor symptoms and adjust as required.  - Reports outpatient follow-up scheduled for 05/20; may need further exchange transfusions in outpatient setting, last in record appear to be in 2024.  - Notes increased LUE swelling predominantly upper portion of arm. Check U/S soft tissue / venous to evaluate for fluid collection or thrombosis.  - Making progress with pain control and possible discharge tomorrow if she continues to do well

## 2025-05-14 ENCOUNTER — ANTI-COAG VISIT (OUTPATIENT)
Dept: CARDIOLOGY | Facility: CLINIC | Age: 45
End: 2025-05-14
Payer: MEDICARE

## 2025-05-14 DIAGNOSIS — Z79.01 CHRONIC ANTICOAGULATION: Primary | Chronic | ICD-10-CM

## 2025-05-14 DIAGNOSIS — Z86.711 HISTORY OF PULMONARY EMBOLISM: Chronic | ICD-10-CM

## 2025-05-14 LAB
HCT VFR BLD AUTO: 27.5 % (ref 37–48.5)
HGB BLD-MCNC: 9 GM/DL (ref 12–16)
INR PPP: 1.5 (ref 0.8–1.2)
PROTHROMBIN TIME: 16.7 SECONDS (ref 9–12.5)

## 2025-05-14 PROCEDURE — 85014 HEMATOCRIT: CPT | Performed by: INTERNAL MEDICINE

## 2025-05-14 PROCEDURE — 85018 HEMOGLOBIN: CPT | Performed by: INTERNAL MEDICINE

## 2025-05-14 PROCEDURE — 85610 PROTHROMBIN TIME: CPT | Performed by: INTERNAL MEDICINE

## 2025-05-14 PROCEDURE — 21400001 HC TELEMETRY ROOM

## 2025-05-14 PROCEDURE — 36415 COLL VENOUS BLD VENIPUNCTURE: CPT | Performed by: INTERNAL MEDICINE

## 2025-05-14 PROCEDURE — 93793 ANTICOAG MGMT PT WARFARIN: CPT | Mod: S$GLB,,,

## 2025-05-14 PROCEDURE — 63600175 PHARM REV CODE 636 W HCPCS: Performed by: INTERNAL MEDICINE

## 2025-05-14 PROCEDURE — 25000003 PHARM REV CODE 250: Performed by: PHYSICIAN ASSISTANT

## 2025-05-14 PROCEDURE — 25000003 PHARM REV CODE 250: Performed by: INTERNAL MEDICINE

## 2025-05-14 PROCEDURE — 94761 N-INVAS EAR/PLS OXIMETRY MLT: CPT

## 2025-05-14 RX ADMIN — ACETAMINOPHEN 1000 MG: 500 TABLET, FILM COATED ORAL at 06:05

## 2025-05-14 RX ADMIN — SENNOSIDES AND DOCUSATE SODIUM 1 TABLET: 50; 8.6 TABLET ORAL at 09:05

## 2025-05-14 RX ADMIN — METHOCARBAMOL 500 MG: 500 TABLET ORAL at 08:05

## 2025-05-14 RX ADMIN — MORPHINE SULFATE 30 MG: 15 TABLET, FILM COATED, EXTENDED RELEASE ORAL at 06:05

## 2025-05-14 RX ADMIN — GABAPENTIN 900 MG: 300 CAPSULE ORAL at 02:05

## 2025-05-14 RX ADMIN — TIZANIDINE 6 MG: 2 TABLET ORAL at 10:05

## 2025-05-14 RX ADMIN — DIPHENHYDRAMINE HYDROCHLORIDE 50 MG: 50 INJECTION, SOLUTION INTRAMUSCULAR; INTRAVENOUS at 08:05

## 2025-05-14 RX ADMIN — MORPHINE SULFATE 30 MG: 15 TABLET, FILM COATED, EXTENDED RELEASE ORAL at 02:05

## 2025-05-14 RX ADMIN — HYDROMORPHONE HYDROCHLORIDE 3 MG: 2 INJECTION INTRAMUSCULAR; INTRAVENOUS; SUBCUTANEOUS at 04:05

## 2025-05-14 RX ADMIN — FOLIC ACID 1 MG: 1 TABLET ORAL at 09:05

## 2025-05-14 RX ADMIN — TIZANIDINE 6 MG: 2 TABLET ORAL at 02:05

## 2025-05-14 RX ADMIN — ACETAMINOPHEN 1000 MG: 500 TABLET, FILM COATED ORAL at 09:05

## 2025-05-14 RX ADMIN — HYDROXYZINE HYDROCHLORIDE 50 MG: 25 TABLET ORAL at 02:05

## 2025-05-14 RX ADMIN — DIPHENHYDRAMINE HYDROCHLORIDE 50 MG: 50 INJECTION, SOLUTION INTRAMUSCULAR; INTRAVENOUS at 12:05

## 2025-05-14 RX ADMIN — AMLODIPINE BESYLATE 10 MG: 5 TABLET ORAL at 08:05

## 2025-05-14 RX ADMIN — MORPHINE SULFATE 30 MG: 15 TABLET, FILM COATED, EXTENDED RELEASE ORAL at 10:05

## 2025-05-14 RX ADMIN — TIZANIDINE 6 MG: 2 TABLET ORAL at 06:05

## 2025-05-14 RX ADMIN — SENNOSIDES AND DOCUSATE SODIUM 1 TABLET: 50; 8.6 TABLET ORAL at 08:05

## 2025-05-14 RX ADMIN — LACTULOSE 20 G: 20 SOLUTION ORAL at 08:05

## 2025-05-14 RX ADMIN — GABAPENTIN 900 MG: 300 CAPSULE ORAL at 09:05

## 2025-05-14 RX ADMIN — HYDROMORPHONE HYDROCHLORIDE 3 MG: 2 INJECTION INTRAMUSCULAR; INTRAVENOUS; SUBCUTANEOUS at 09:05

## 2025-05-14 RX ADMIN — WARFARIN SODIUM 2.5 MG: 2.5 TABLET ORAL at 04:05

## 2025-05-14 RX ADMIN — HYDROMORPHONE HYDROCHLORIDE 3 MG: 2 INJECTION INTRAMUSCULAR; INTRAVENOUS; SUBCUTANEOUS at 08:05

## 2025-05-14 RX ADMIN — PANTOPRAZOLE SODIUM 40 MG: 40 TABLET, DELAYED RELEASE ORAL at 08:05

## 2025-05-14 RX ADMIN — SODIUM CHLORIDE, POTASSIUM CHLORIDE, SODIUM LACTATE AND CALCIUM CHLORIDE: 600; 310; 30; 20 INJECTION, SOLUTION INTRAVENOUS at 02:05

## 2025-05-14 RX ADMIN — FLUOXETINE HYDROCHLORIDE 80 MG: 20 CAPSULE ORAL at 08:05

## 2025-05-14 RX ADMIN — ACETAMINOPHEN 1000 MG: 500 TABLET, FILM COATED ORAL at 02:05

## 2025-05-14 RX ADMIN — DIPHENHYDRAMINE HYDROCHLORIDE 50 MG: 50 INJECTION, SOLUTION INTRAMUSCULAR; INTRAVENOUS at 04:05

## 2025-05-14 RX ADMIN — DIPHENHYDRAMINE HYDROCHLORIDE 50 MG: 50 INJECTION, SOLUTION INTRAMUSCULAR; INTRAVENOUS at 09:05

## 2025-05-14 RX ADMIN — HYDROMORPHONE HYDROCHLORIDE 3 MG: 2 INJECTION INTRAMUSCULAR; INTRAVENOUS; SUBCUTANEOUS at 12:05

## 2025-05-14 RX ADMIN — OXYCODONE HYDROCHLORIDE 10 MG: 10 TABLET ORAL at 02:05

## 2025-05-14 RX ADMIN — GABAPENTIN 900 MG: 300 CAPSULE ORAL at 06:05

## 2025-05-14 RX ADMIN — PRAZOSIN HYDROCHLORIDE 1 MG: 1 CAPSULE ORAL at 09:05
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
## 2025-05-14 NOTE — PLAN OF CARE
Nutrition Plan of Care:    Recommendations     1. Continue with regular diet   2. Monitor labs and weights     Goals: Patient to consume >75% EEN prior to RD follow up.  Nutrition Goal Status: goal met  Communication of RD Recs: other (comment) (poc)     Nutrition Discharge Planning     Nutrition Discharge Planning: General healthy diet     Assessment and Plan     No problem at this time.  RD to continue to monitor for nutrition problems at follow up visits.       Arabella Haley MS, RDN, LDN

## 2025-05-15 ENCOUNTER — TELEPHONE (OUTPATIENT)
Dept: HEMATOLOGY/ONCOLOGY | Facility: CLINIC | Age: 45
End: 2025-05-15
Payer: MEDICARE

## 2025-05-15 VITALS
SYSTOLIC BLOOD PRESSURE: 125 MMHG | HEIGHT: 62 IN | RESPIRATION RATE: 18 BRPM | BODY MASS INDEX: 45.6 KG/M2 | TEMPERATURE: 99 F | HEART RATE: 90 BPM | OXYGEN SATURATION: 96 % | WEIGHT: 247.81 LBS | DIASTOLIC BLOOD PRESSURE: 64 MMHG

## 2025-05-15 LAB
HCT VFR BLD AUTO: 29.5 % (ref 37–48.5)
HGB BLD-MCNC: 9.6 GM/DL (ref 12–16)
HOLD SPECIMEN: NORMAL
INR PPP: 1.7 (ref 0.8–1.2)
PLATELET # BLD AUTO: 307 K/UL (ref 150–450)
PMV BLD AUTO: 9.1 FL (ref 9.2–12.9)
PROTHROMBIN TIME: 18.5 SECONDS (ref 9–12.5)

## 2025-05-15 PROCEDURE — 85049 AUTOMATED PLATELET COUNT: CPT | Performed by: INTERNAL MEDICINE

## 2025-05-15 PROCEDURE — 36415 COLL VENOUS BLD VENIPUNCTURE: CPT | Performed by: INTERNAL MEDICINE

## 2025-05-15 PROCEDURE — 85610 PROTHROMBIN TIME: CPT | Performed by: INTERNAL MEDICINE

## 2025-05-15 PROCEDURE — 63600175 PHARM REV CODE 636 W HCPCS: Performed by: INTERNAL MEDICINE

## 2025-05-15 PROCEDURE — 94761 N-INVAS EAR/PLS OXIMETRY MLT: CPT

## 2025-05-15 PROCEDURE — 85014 HEMATOCRIT: CPT | Performed by: INTERNAL MEDICINE

## 2025-05-15 PROCEDURE — 25000003 PHARM REV CODE 250: Performed by: INTERNAL MEDICINE

## 2025-05-15 PROCEDURE — 25000003 PHARM REV CODE 250: Performed by: PHYSICIAN ASSISTANT

## 2025-05-15 PROCEDURE — 85018 HEMOGLOBIN: CPT | Performed by: INTERNAL MEDICINE

## 2025-05-15 RX ORDER — HYDROCODONE BITARTRATE AND ACETAMINOPHEN 10; 325 MG/1; MG/1
1 TABLET ORAL EVERY 6 HOURS PRN
Qty: 28 TABLET | Refills: 0 | Status: SHIPPED | OUTPATIENT
Start: 2025-05-15 | End: 2025-05-21 | Stop reason: SDUPTHER

## 2025-05-15 RX ORDER — MORPHINE SULFATE 30 MG/1
30 TABLET, FILM COATED, EXTENDED RELEASE ORAL 2 TIMES DAILY
Qty: 14 TABLET | Refills: 0 | Status: ON HOLD | OUTPATIENT
Start: 2025-05-15 | End: 2025-05-22

## 2025-05-15 RX ORDER — TIZANIDINE 4 MG/1
4 TABLET ORAL EVERY 8 HOURS PRN
Qty: 21 TABLET | Refills: 0 | Status: ON HOLD | OUTPATIENT
Start: 2025-05-15 | End: 2025-05-22 | Stop reason: SDUPTHER

## 2025-05-15 RX ADMIN — ACETAMINOPHEN 1000 MG: 500 TABLET, FILM COATED ORAL at 06:05

## 2025-05-15 RX ADMIN — TIZANIDINE 6 MG: 2 TABLET ORAL at 06:05

## 2025-05-15 RX ADMIN — HYDROMORPHONE HYDROCHLORIDE 3 MG: 2 INJECTION INTRAMUSCULAR; INTRAVENOUS; SUBCUTANEOUS at 01:05

## 2025-05-15 RX ADMIN — DIPHENHYDRAMINE HYDROCHLORIDE 50 MG: 50 INJECTION, SOLUTION INTRAMUSCULAR; INTRAVENOUS at 01:05

## 2025-05-15 RX ADMIN — LACTULOSE 20 G: 20 SOLUTION ORAL at 09:05

## 2025-05-15 RX ADMIN — DIPHENHYDRAMINE HYDROCHLORIDE 50 MG: 50 INJECTION, SOLUTION INTRAMUSCULAR; INTRAVENOUS at 09:05

## 2025-05-15 RX ADMIN — TIZANIDINE 6 MG: 2 TABLET ORAL at 02:05

## 2025-05-15 RX ADMIN — MORPHINE SULFATE 30 MG: 15 TABLET, FILM COATED, EXTENDED RELEASE ORAL at 06:05

## 2025-05-15 RX ADMIN — DIPHENHYDRAMINE HYDROCHLORIDE 50 MG: 50 INJECTION, SOLUTION INTRAMUSCULAR; INTRAVENOUS at 05:05

## 2025-05-15 RX ADMIN — GABAPENTIN 900 MG: 300 CAPSULE ORAL at 01:05

## 2025-05-15 RX ADMIN — PANTOPRAZOLE SODIUM 40 MG: 40 TABLET, DELAYED RELEASE ORAL at 09:05

## 2025-05-15 RX ADMIN — HYDROMORPHONE HYDROCHLORIDE 3 MG: 2 INJECTION INTRAMUSCULAR; INTRAVENOUS; SUBCUTANEOUS at 05:05

## 2025-05-15 RX ADMIN — ACETAMINOPHEN 1000 MG: 500 TABLET, FILM COATED ORAL at 01:05

## 2025-05-15 RX ADMIN — SENNOSIDES AND DOCUSATE SODIUM 1 TABLET: 50; 8.6 TABLET ORAL at 09:05

## 2025-05-15 RX ADMIN — FLUOXETINE HYDROCHLORIDE 80 MG: 20 CAPSULE ORAL at 09:05

## 2025-05-15 RX ADMIN — AMLODIPINE BESYLATE 10 MG: 5 TABLET ORAL at 09:05

## 2025-05-15 RX ADMIN — GABAPENTIN 900 MG: 300 CAPSULE ORAL at 06:05

## 2025-05-15 RX ADMIN — FOLIC ACID 1 MG: 1 TABLET ORAL at 09:05

## 2025-05-15 RX ADMIN — HYDROMORPHONE HYDROCHLORIDE 3 MG: 2 INJECTION INTRAMUSCULAR; INTRAVENOUS; SUBCUTANEOUS at 09:05

## 2025-05-15 RX ADMIN — MORPHINE SULFATE 30 MG: 15 TABLET, FILM COATED, EXTENDED RELEASE ORAL at 01:05

## 2025-05-15 RX ADMIN — SODIUM CHLORIDE, POTASSIUM CHLORIDE, SODIUM LACTATE AND CALCIUM CHLORIDE: 600; 310; 30; 20 INJECTION, SOLUTION INTRAVENOUS at 01:05

## 2025-05-15 NOTE — PLAN OF CARE
Problem: Adult Inpatient Plan of Care  Goal: Plan of Care Review  Outcome: Progressing  Goal: Patient-Specific Goal (Individualized)  Outcome: Progressing  Goal: Absence of Hospital-Acquired Illness or Injury  Outcome: Progressing  Goal: Optimal Comfort and Wellbeing  Outcome: Progressing  Goal: Readiness for Transition of Care  Outcome: Progressing     Problem: Acute Kidney Injury/Impairment  Goal: Fluid and Electrolyte Balance  Outcome: Progressing  Goal: Improved Oral Intake  Outcome: Progressing  Goal: Effective Renal Function  Outcome: Progressing     Problem: Infection  Goal: Absence of Infection Signs and Symptoms  Outcome: Progressing     Problem: Bariatric Environmental Safety  Goal: Safety Maintained with Care  Outcome: Progressing     Problem: Fall Injury Risk  Goal: Absence of Fall and Fall-Related Injury  Outcome: Progressing     Problem: Pain Acute  Goal: Optimal Pain Control and Function  Outcome: Progressing     Problem: Gas Exchange Impaired  Goal: Optimal Gas Exchange  Outcome: Progressing

## 2025-05-15 NOTE — PLAN OF CARE
Approval given by Dr Jarred Clark ok for hospitalist to dispense 7 day supply of MS Contin, Norco, Tizanidine to cover patient until clinic follow up appt 5/20/25.
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
## 2025-05-15 NOTE — TELEPHONE ENCOUNTER
----- Message from Shiraz sent at 5/15/2025 11:07 AM CDT -----  Regarding: Consult/Advisory  Contact: [phone]  Consult/Advisory Name Of Caller: Ondina  Frances  Contact Preference: [phone] Nature of call: Calling about pt refills. Please call back to further assist.

## 2025-05-15 NOTE — ASSESSMENT & PLAN NOTE
"- Sickle cell anemia with pain and chronic opiate use for symptom control.  - Continue adjunct pain control with acetaminophen 1000mg PO q8hr, gabapentin at 900mg PO q8hr.  - Continue morphine 30mg PO q8hr, oxycodone 10mg PO q4hr PRN, hydromorphone at 3mg IV q6hr PRN, diphenhydramine 50mg IV q4hr PRN. Continue tizanidine at 6mg PO TID.  - Continue IVFs with LR.  - Monitor symptoms and adjust as required.  - Reports outpatient follow-up scheduled for 05/20; may need further exchange transfusions in outpatient setting, last in record appear to be in 2024.  - Notes increased LUE swelling predominantly upper portion of arm. Checked U/S soft tissue / venous to evaluate for fluid collection or thrombosis with only chronic findings and swelling improved  - Add telemetry given report of feeling like she might have "heart missing beats"  - Making progress with pain control and possible discharge later today or tomorrow if she continues to do well  "

## 2025-05-15 NOTE — PLAN OF CARE
Case Management Final Discharge Note    Discharge Disposition: Home    New DME ordered / company name: None    Relevant SDOH / Transition of Care Barriers:  None    Person available to provide assistance at home when needed and their contact information: Self    Scheduled followup appointment: Hematology     Referrals placed: None    Transportation: Lyft        Patient educated on discharge services and updated on DC plan. Bedside RN notified, patient clear to discharge from Case Management Perspective.      05/15/25 1503   Final Note   Assessment Type Final Discharge Note   Anticipated Discharge Disposition Home   Hospital Resources/Appts/Education Provided Provided patient/caregiver with written discharge plan information;Appointments scheduled and added to AVS   Post-Acute Status   Discharge Delays None known at this time     [facility] - Med Surg ([address])  Discharge Final Note    Primary Care Provider: No, Primary Doctor    Expected Discharge Date: 5/15/2025    Final Discharge Note (most recent)       Final Note - 05/15/25 1503          Final Note    Assessment Type Final Discharge Note (P)      Anticipated Discharge Disposition Home or Self Care (P)      Hospital Resources/Appts/Education Provided Provided patient/caregiver with written discharge plan information;Appointments scheduled and added to AVS (P)         Post-Acute Status    Discharge Delays None known at this time (P)                      Important Message from Medicare  Important Message from Medicare regarding Discharge Appeal Rights: Explained to patient/caregiver, Signed/date by patient/caregiver     Date IMM was signed: 05/13/25  Time IMM was signed: 1221    Contact Info       Hematologist        Next Steps: Follow up in 1 week(s)

## 2025-05-15 NOTE — SUBJECTIVE & OBJECTIVE
Interval History: No acute events overnight. Reports pain is improving but still not at a level that can be managed at home this morning and will see in the afternoon, predominantly to anterior upper chest wall and L neck.    Review of Systems   Constitutional:  Negative for chills and fever.   Respiratory:  Negative for cough and shortness of breath.    Cardiovascular:  Negative for chest pain and palpitations.   Gastrointestinal:  Negative for abdominal pain, nausea and vomiting.   Musculoskeletal:  Positive for arthralgias and myalgias.     Objective:     Vital Signs (Most Recent):  Temp: 98 °F (36.7 °C) (05/14/25 1943)  Pulse: 90 (05/14/25 1943)  Resp: 17 (05/14/25 1943)  BP: 115/87 (05/14/25 1943)  SpO2: 99 % (05/14/25 1943) Vital Signs (24h Range):  Temp:  [97.1 °F (36.2 °C)-98.3 °F (36.8 °C)] 98 °F (36.7 °C)  Pulse:  [67-90] 90  Resp:  [16-18] 17  SpO2:  [94 %-99 %] 99 %  BP: (115-148)/(59-87) 115/87     Weight: 112.4 kg (247 lb 12.8 oz)  Body mass index is 45.32 kg/m².    Intake/Output Summary (Last 24 hours) at 5/14/2025 2013  Last data filed at 5/14/2025 1618  Gross per 24 hour   Intake 1500 ml   Output 1900 ml   Net -400 ml         Physical Exam  Vitals and nursing note reviewed.   Constitutional:       General: She is not in acute distress.     Appearance: She is well-developed.   HENT:      Head: Normocephalic and atraumatic.   Eyes:      General:         Right eye: No discharge.         Left eye: No discharge.      Conjunctiva/sclera: Conjunctivae normal.   Cardiovascular:      Rate and Rhythm: Normal rate.      Pulses: Normal pulses.   Pulmonary:      Effort: Pulmonary effort is normal. No respiratory distress.   Abdominal:      Palpations: Abdomen is soft.      Tenderness: There is no abdominal tenderness.   Musculoskeletal:         General: Tenderness present. Normal range of motion.      Right lower leg: No edema.      Left lower leg: No edema.   Skin:     General: Skin is warm and dry.    Neurological:      Mental Status: She is alert and oriented to person, place, and time.        Significant Labs:   CBC:  Recent Labs   Lab 05/12/25  0623 05/13/25  0348 05/14/25  0430   HGB 8.7* 8.8* 9.0*   HCT 26.6* 26.5* 27.5*     --   --       Significant Imaging: I have reviewed and interpreted all pertinent imaging results/findings within the past 24 hours.

## 2025-05-16 ENCOUNTER — PATIENT MESSAGE (OUTPATIENT)
Dept: CARDIOLOGY | Facility: CLINIC | Age: 45
End: 2025-05-16
Payer: MEDICARE
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
## 2025-05-16 NOTE — PROGRESS NOTES
Admitted 5/5-5/15    Hospital Course:   Admitted with sickle cell pain and started IVFs, adjunct and narcotic pain control. Symptoms improved gradually but reported significant LUE swelling, L neck and L anterior upper chest wall tenderness. Imaging performed without noted acute abnormality. Pain control improved and H/H remained stable. Patient was stable for discharge with follow up with PCP and Hematologist. Patient reported being out of her outpatient regimen for pain control. Reached out to her Hematologists, and patient will be new to Dr. Soni and has not established care yet; transferring care from Dr. Clark and he approved dispensing 1 week supply of pain medications to carry patient to her appointment with Dr. Soni which was schedule for 5/20/2025.        Inpatient INRs noted and appears home warfarin dose continued through admit. INR at discharge slightly below goal so recommend boost dose warfarin tonight then resume maintenance plan. INR next week.

## 2025-05-20 ENCOUNTER — ANTI-COAG VISIT (OUTPATIENT)
Dept: CARDIOLOGY | Facility: CLINIC | Age: 45
End: 2025-05-20
Payer: MEDICARE

## 2025-05-20 ENCOUNTER — TELEPHONE (OUTPATIENT)
Dept: HEMATOLOGY/ONCOLOGY | Facility: CLINIC | Age: 45
End: 2025-05-20
Payer: MEDICARE

## 2025-05-20 ENCOUNTER — HOSPITAL ENCOUNTER (INPATIENT)
Facility: OTHER | Age: 45
LOS: 9 days | Discharge: HOME HEALTH | DRG: 812 | End: 2025-05-30
Attending: EMERGENCY MEDICINE | Admitting: STUDENT IN AN ORGANIZED HEALTH CARE EDUCATION/TRAINING PROGRAM
Payer: MEDICARE

## 2025-05-20 DIAGNOSIS — Z79.01 CHRONIC ANTICOAGULATION: Primary | Chronic | ICD-10-CM

## 2025-05-20 DIAGNOSIS — Z86.711 HISTORY OF PULMONARY EMBOLISM: Chronic | ICD-10-CM

## 2025-05-20 DIAGNOSIS — D57.1 SICKLE CELL ANEMIA: ICD-10-CM

## 2025-05-20 DIAGNOSIS — D57.00 HB-SS DISEASE WITH VASO-OCCLUSIVE PAIN: ICD-10-CM

## 2025-05-20 DIAGNOSIS — J45.20 INTERMITTENT ASTHMA, UNSPECIFIED ASTHMA SEVERITY, UNSPECIFIED WHETHER COMPLICATED: ICD-10-CM

## 2025-05-20 DIAGNOSIS — D57.00 SICKLE CELL PAIN CRISIS: ICD-10-CM

## 2025-05-20 DIAGNOSIS — M79.10 MYALGIA: ICD-10-CM

## 2025-05-20 DIAGNOSIS — R07.9 CHEST PAIN: ICD-10-CM

## 2025-05-20 DIAGNOSIS — R06.02 SHORTNESS OF BREATH: ICD-10-CM

## 2025-05-20 DIAGNOSIS — R94.31 ABNORMAL EKG: Primary | ICD-10-CM

## 2025-05-20 PROBLEM — I82.C11 ACUTE INTERNAL JUGULAR VEIN THROMBOSIS, RIGHT: Status: RESOLVED | Noted: 2025-04-05 | Resolved: 2025-05-20

## 2025-05-20 PROBLEM — E87.6 HYPOKALEMIA: Status: ACTIVE | Noted: 2025-05-20
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
## 2025-05-20 PROBLEM — D62 ACUTE BLOOD LOSS ANEMIA: Status: RESOLVED | Noted: 2025-04-18 | Resolved: 2025-05-20

## 2025-05-20 PROBLEM — N39.0 UTI (URINARY TRACT INFECTION): Status: ACTIVE | Noted: 2025-05-20

## 2025-05-20 PROBLEM — I65.22: Status: RESOLVED | Noted: 2024-08-03 | Resolved: 2025-05-20

## 2025-05-20 LAB
ABSOLUTE EOSINOPHIL (OHS): 0.03 K/UL
ABSOLUTE MONOCYTE (OHS): 0.45 K/UL (ref 0.3–1)
ABSOLUTE NEUTROPHIL COUNT (OHS): 5.78 K/UL (ref 1.8–7.7)
ALBUMIN SERPL BCP-MCNC: 4.3 G/DL (ref 3.5–5.2)
ALP SERPL-CCNC: 116 UNIT/L (ref 40–150)
ALT SERPL W/O P-5'-P-CCNC: 18 UNIT/L (ref 10–44)
ANION GAP (OHS): 12 MMOL/L (ref 8–16)
AST SERPL-CCNC: 23 UNIT/L (ref 11–45)
B-HCG UR QL: NEGATIVE
BACTERIA #/AREA URNS AUTO: ABNORMAL /HPF
BASOPHILS # BLD AUTO: 0.05 K/UL
BASOPHILS NFR BLD AUTO: 0.6 %
BILIRUB SERPL-MCNC: 0.8 MG/DL (ref 0.1–1)
BILIRUB UR QL STRIP.AUTO: NEGATIVE
BIPAP: 0
BNP SERPL-MCNC: 29 PG/ML (ref 0–99)
BUN SERPL-MCNC: 14 MG/DL (ref 6–20)
CALCIUM SERPL-MCNC: 10.3 MG/DL (ref 8.7–10.5)
CHLORIDE SERPL-SCNC: 106 MMOL/L (ref 95–110)
CLARITY UR: CLEAR
CO2 SERPL-SCNC: 20 MMOL/L (ref 23–29)
COLOR UR AUTO: YELLOW
CORRECTED TEMPERATURE (PCO2): 27 MMHG
CORRECTED TEMPERATURE (PH): 7.52
CORRECTED TEMPERATURE (PO2): 45.1 MMHG
CREAT SERPL-MCNC: 1.2 MG/DL (ref 0.5–1.4)
CTP QC/QA: YES
ERYTHROCYTE [DISTWIDTH] IN BLOOD BY AUTOMATED COUNT: 23.6 % (ref 11.5–14.5)
FIO2: 21 %
GFR SERPLBLD CREATININE-BSD FMLA CKD-EPI: 56 ML/MIN/1.73/M2
GLUCOSE SERPL-MCNC: 147 MG/DL (ref 70–110)
GLUCOSE UR QL STRIP: NEGATIVE
HCT VFR BLD AUTO: 34.8 % (ref 37–48.5)
HCT VFR BLD CALC: 38.5 % (ref 36–54)
HGB BLD-MCNC: 11.3 GM/DL (ref 12–16)
HGB UR QL STRIP: NEGATIVE
HOLD SPECIMEN: NORMAL
IMM GRANULOCYTES # BLD AUTO: 0.02 K/UL (ref 0–0.04)
IMM GRANULOCYTES NFR BLD AUTO: 0.2 % (ref 0–0.5)
INR PPP: 2.7 (ref 0.8–1.2)
KETONES UR QL STRIP: NEGATIVE
LDH SERPL L TO P-CCNC: 1.6 MMOL/L (ref 0.5–2.2)
LEUKOCYTE ESTERASE UR QL STRIP: ABNORMAL
LYMPHOCYTES # BLD AUTO: 1.71 K/UL (ref 1–4.8)
MCH RBC QN AUTO: 21.7 PG (ref 27–31)
MCHC RBC AUTO-ENTMCNC: 32.5 G/DL (ref 32–36)
MCV RBC AUTO: 67 FL (ref 82–98)
MICROSCOPIC COMMENT: ABNORMAL
NITRITE UR QL STRIP: NEGATIVE
NUCLEATED RBC (/100WBC) (OHS): 1 /100 WBC
OHS QRS DURATION: 88 MS
OHS QTC CALCULATION: 445 MS
PCO2 BLDA: 27 MMHG (ref 35–45)
PH SMN: 7.52 [PH] (ref 7.35–7.45)
PH UR STRIP: 6 [PH]
PLATELET # BLD AUTO: 316 K/UL (ref 150–450)
PMV BLD AUTO: 9.3 FL (ref 9.2–12.9)
PO2 BLDA: 45.1 MMHG (ref 40–60)
POC BASE DEFICIT: 0.2 MMOL/L
POC HCO3: 24.3 MMOL/L (ref 24–28)
POC IONIZED CALCIUM: 1.05 MMOL/L (ref 1.06–1.42)
POC PERFORMED BY: ABNORMAL
POC TEMPERATURE: 37 C
POTASSIUM BLD-SCNC: 3.4 MMOL/L (ref 3.5–5.1)
POTASSIUM SERPL-SCNC: 3.4 MMOL/L (ref 3.5–5.1)
PROT SERPL-MCNC: 9.3 GM/DL (ref 6–8.4)
PROT UR QL STRIP: ABNORMAL
PROTHROMBIN TIME: 27.8 SECONDS (ref 9–12.5)
RBC # BLD AUTO: 5.21 M/UL (ref 4–5.4)
RELATIVE EOSINOPHIL (OHS): 0.4 %
RELATIVE LYMPHOCYTE (OHS): 21.3 % (ref 18–48)
RELATIVE MONOCYTE (OHS): 5.6 % (ref 4–15)
RELATIVE NEUTROPHIL (OHS): 71.9 % (ref 38–73)
RETICS/RBC NFR AUTO: 0.6 % (ref 0.5–2.5)
SODIUM BLD-SCNC: 143 MMOL/L (ref 136–145)
SODIUM SERPL-SCNC: 138 MMOL/L (ref 136–145)
SP GR UR STRIP: 1.01
SPECIMEN SOURCE: ABNORMAL
SQUAMOUS #/AREA URNS AUTO: 1 /HPF
TROPONIN I SERPL HS-MCNC: <3 NG/L
TROPONIN I SERPL HS-MCNC: <3 NG/L
UROBILINOGEN UR STRIP-ACNC: NEGATIVE EU/DL
WBC # BLD AUTO: 8.04 K/UL (ref 3.9–12.7)
WBC #/AREA URNS AUTO: 3 /HPF (ref 0–5)

## 2025-05-20 PROCEDURE — 93005 ELECTROCARDIOGRAM TRACING: CPT

## 2025-05-20 PROCEDURE — 25000003 PHARM REV CODE 250

## 2025-05-20 PROCEDURE — 85025 COMPLETE CBC W/AUTO DIFF WBC: CPT | Performed by: PHYSICIAN ASSISTANT

## 2025-05-20 PROCEDURE — 81003 URINALYSIS AUTO W/O SCOPE: CPT | Performed by: PHYSICIAN ASSISTANT

## 2025-05-20 PROCEDURE — 82310 ASSAY OF CALCIUM: CPT | Performed by: PHYSICIAN ASSISTANT

## 2025-05-20 PROCEDURE — 63600175 PHARM REV CODE 636 W HCPCS

## 2025-05-20 PROCEDURE — 93010 ELECTROCARDIOGRAM REPORT: CPT | Mod: ,,, | Performed by: INTERNAL MEDICINE

## 2025-05-20 PROCEDURE — 85660 RBC SICKLE CELL TEST: CPT | Performed by: STUDENT IN AN ORGANIZED HEALTH CARE EDUCATION/TRAINING PROGRAM

## 2025-05-20 PROCEDURE — 84484 ASSAY OF TROPONIN QUANT: CPT | Mod: 59 | Performed by: PHYSICIAN ASSISTANT

## 2025-05-20 PROCEDURE — 99900035 HC TECH TIME PER 15 MIN (STAT)

## 2025-05-20 PROCEDURE — 82803 BLOOD GASES ANY COMBINATION: CPT

## 2025-05-20 PROCEDURE — G0378 HOSPITAL OBSERVATION PER HR: HCPCS

## 2025-05-20 PROCEDURE — 25000003 PHARM REV CODE 250: Performed by: PHYSICIAN ASSISTANT

## 2025-05-20 PROCEDURE — 83880 ASSAY OF NATRIURETIC PEPTIDE: CPT | Performed by: PHYSICIAN ASSISTANT

## 2025-05-20 PROCEDURE — 83605 ASSAY OF LACTIC ACID: CPT

## 2025-05-20 PROCEDURE — 86902 BLOOD TYPE ANTIGEN DONOR EA: CPT | Performed by: STUDENT IN AN ORGANIZED HEALTH CARE EDUCATION/TRAINING PROGRAM

## 2025-05-20 PROCEDURE — 85045 AUTOMATED RETICULOCYTE COUNT: CPT | Performed by: PHYSICIAN ASSISTANT

## 2025-05-20 PROCEDURE — 87040 BLOOD CULTURE FOR BACTERIA: CPT | Performed by: PHYSICIAN ASSISTANT

## 2025-05-20 PROCEDURE — 63600175 PHARM REV CODE 636 W HCPCS: Performed by: PHYSICIAN ASSISTANT

## 2025-05-20 PROCEDURE — 86900 BLOOD TYPING SEROLOGIC ABO: CPT | Performed by: STUDENT IN AN ORGANIZED HEALTH CARE EDUCATION/TRAINING PROGRAM

## 2025-05-20 PROCEDURE — 85610 PROTHROMBIN TIME: CPT | Performed by: PHYSICIAN ASSISTANT

## 2025-05-20 PROCEDURE — 87632 RESP VIRUS 6-11 TARGETS: CPT | Performed by: PHYSICIAN ASSISTANT

## 2025-05-20 PROCEDURE — 81025 URINE PREGNANCY TEST: CPT | Mod: 59 | Performed by: STUDENT IN AN ORGANIZED HEALTH CARE EDUCATION/TRAINING PROGRAM

## 2025-05-20 PROCEDURE — 83020 HEMOGLOBIN ELECTROPHORESIS: CPT | Performed by: STUDENT IN AN ORGANIZED HEALTH CARE EDUCATION/TRAINING PROGRAM

## 2025-05-20 RX ORDER — GABAPENTIN 300 MG/1
900 CAPSULE ORAL EVERY 8 HOURS
Status: DISCONTINUED | OUTPATIENT
Start: 2025-05-20 | End: 2025-05-21

## 2025-05-20 RX ORDER — ACETAMINOPHEN 500 MG
1000 TABLET ORAL EVERY 8 HOURS
Status: DISCONTINUED | OUTPATIENT
Start: 2025-05-20 | End: 2025-05-21

## 2025-05-20 RX ORDER — AMOXICILLIN 250 MG
1 CAPSULE ORAL 2 TIMES DAILY
Status: DISCONTINUED | OUTPATIENT
Start: 2025-05-20 | End: 2025-05-30 | Stop reason: HOSPADM

## 2025-05-20 RX ORDER — ONDANSETRON HYDROCHLORIDE 2 MG/ML
4 INJECTION, SOLUTION INTRAVENOUS
Status: COMPLETED | OUTPATIENT
Start: 2025-05-20 | End: 2025-05-20

## 2025-05-20 RX ORDER — FLUOXETINE 20 MG/1
80 CAPSULE ORAL DAILY
Status: DISCONTINUED | OUTPATIENT
Start: 2025-05-21 | End: 2025-05-30 | Stop reason: HOSPADM

## 2025-05-20 RX ORDER — CEFTRIAXONE 2 G/1
2 INJECTION, POWDER, FOR SOLUTION INTRAMUSCULAR; INTRAVENOUS
Status: DISCONTINUED | OUTPATIENT
Start: 2025-05-20 | End: 2025-05-20

## 2025-05-20 RX ORDER — HYDROXYZINE HYDROCHLORIDE 25 MG/1
50 TABLET, FILM COATED ORAL EVERY 4 HOURS PRN
Status: DISCONTINUED | OUTPATIENT
Start: 2025-05-20 | End: 2025-05-20

## 2025-05-20 RX ORDER — TIZANIDINE 4 MG/1
4 TABLET ORAL EVERY 8 HOURS PRN
Status: DISCONTINUED | OUTPATIENT
Start: 2025-05-20 | End: 2025-05-30 | Stop reason: HOSPADM

## 2025-05-20 RX ORDER — GLUCAGON 1 MG
1 KIT INJECTION
Status: DISCONTINUED | OUTPATIENT
Start: 2025-05-20 | End: 2025-05-21

## 2025-05-20 RX ORDER — MORPHINE SULFATE 15 MG/1
30 TABLET, FILM COATED, EXTENDED RELEASE ORAL 2 TIMES DAILY
Refills: 0 | Status: DISCONTINUED | OUTPATIENT
Start: 2025-05-20 | End: 2025-05-30 | Stop reason: HOSPADM

## 2025-05-20 RX ORDER — TALC
6 POWDER (GRAM) TOPICAL NIGHTLY PRN
Status: DISCONTINUED | OUTPATIENT
Start: 2025-05-20 | End: 2025-05-30 | Stop reason: HOSPADM

## 2025-05-20 RX ORDER — CEFTRIAXONE 1 G/1
1 INJECTION, POWDER, FOR SOLUTION INTRAMUSCULAR; INTRAVENOUS
Status: DISCONTINUED | OUTPATIENT
Start: 2025-05-21 | End: 2025-05-21

## 2025-05-20 RX ORDER — IBUPROFEN 200 MG
16 TABLET ORAL
Status: DISCONTINUED | OUTPATIENT
Start: 2025-05-20 | End: 2025-05-21

## 2025-05-20 RX ORDER — DROPERIDOL 2.5 MG/ML
1.25 INJECTION, SOLUTION INTRAMUSCULAR; INTRAVENOUS ONCE
Status: COMPLETED | OUTPATIENT
Start: 2025-05-20 | End: 2025-05-20

## 2025-05-20 RX ORDER — AMLODIPINE BESYLATE 5 MG/1
10 TABLET ORAL DAILY
Status: DISCONTINUED | OUTPATIENT
Start: 2025-05-21 | End: 2025-05-30 | Stop reason: HOSPADM

## 2025-05-20 RX ORDER — SODIUM CHLORIDE, SODIUM LACTATE, POTASSIUM CHLORIDE, CALCIUM CHLORIDE 600; 310; 30; 20 MG/100ML; MG/100ML; MG/100ML; MG/100ML
INJECTION, SOLUTION INTRAVENOUS CONTINUOUS
Status: DISCONTINUED | OUTPATIENT
Start: 2025-05-20 | End: 2025-05-20

## 2025-05-20 RX ORDER — IBUPROFEN 200 MG
24 TABLET ORAL
Status: DISCONTINUED | OUTPATIENT
Start: 2025-05-20 | End: 2025-05-21

## 2025-05-20 RX ORDER — DIPHENHYDRAMINE HCL 25 MG
25 CAPSULE ORAL
Status: COMPLETED | OUTPATIENT
Start: 2025-05-20 | End: 2025-05-20

## 2025-05-20 RX ORDER — ENOXAPARIN SODIUM 100 MG/ML
40 INJECTION SUBCUTANEOUS EVERY 12 HOURS
Status: DISCONTINUED | OUTPATIENT
Start: 2025-05-20 | End: 2025-05-20

## 2025-05-20 RX ORDER — ALBUTEROL SULFATE 90 UG/1
2 INHALANT RESPIRATORY (INHALATION) EVERY 6 HOURS PRN
Status: DISCONTINUED | OUTPATIENT
Start: 2025-05-20 | End: 2025-05-30 | Stop reason: HOSPADM

## 2025-05-20 RX ORDER — HYDROMORPHONE HYDROCHLORIDE 1 MG/ML
1 INJECTION, SOLUTION INTRAMUSCULAR; INTRAVENOUS; SUBCUTANEOUS
Refills: 0 | Status: COMPLETED | OUTPATIENT
Start: 2025-05-20 | End: 2025-05-20

## 2025-05-20 RX ORDER — FLUTICASONE PROPIONATE 50 MCG
1 SPRAY, SUSPENSION (ML) NASAL DAILY
Status: DISCONTINUED | OUTPATIENT
Start: 2025-05-21 | End: 2025-05-30 | Stop reason: HOSPADM

## 2025-05-20 RX ORDER — NALOXONE HCL 0.4 MG/ML
0.02 VIAL (ML) INJECTION
Status: DISCONTINUED | OUTPATIENT
Start: 2025-05-20 | End: 2025-05-30 | Stop reason: HOSPADM

## 2025-05-20 RX ORDER — HYDROMORPHONE HYDROCHLORIDE 2 MG/ML
2 INJECTION, SOLUTION INTRAMUSCULAR; INTRAVENOUS; SUBCUTANEOUS
Status: DISCONTINUED | OUTPATIENT
Start: 2025-05-20 | End: 2025-05-22

## 2025-05-20 RX ORDER — HYDROMORPHONE HYDROCHLORIDE 2 MG/ML
2 INJECTION, SOLUTION INTRAMUSCULAR; INTRAVENOUS; SUBCUTANEOUS
Refills: 0 | Status: COMPLETED | OUTPATIENT
Start: 2025-05-20 | End: 2025-05-20

## 2025-05-20 RX ORDER — FOLIC ACID 1 MG/1
1 TABLET ORAL DAILY
Status: DISCONTINUED | OUTPATIENT
Start: 2025-05-21 | End: 2025-05-30 | Stop reason: HOSPADM

## 2025-05-20 RX ORDER — DIPHENHYDRAMINE HCL 25 MG
50 CAPSULE ORAL EVERY 6 HOURS PRN
Status: DISCONTINUED | OUTPATIENT
Start: 2025-05-20 | End: 2025-05-21

## 2025-05-20 RX ORDER — PANTOPRAZOLE SODIUM 40 MG/1
40 TABLET, DELAYED RELEASE ORAL DAILY
Status: DISCONTINUED | OUTPATIENT
Start: 2025-05-21 | End: 2025-05-30 | Stop reason: HOSPADM

## 2025-05-20 RX ORDER — ACETAMINOPHEN 500 MG
TABLET ORAL
Status: COMPLETED
Start: 2025-05-20 | End: 2025-05-20

## 2025-05-20 RX ORDER — SODIUM CHLORIDE, SODIUM LACTATE, POTASSIUM CHLORIDE, CALCIUM CHLORIDE 600; 310; 30; 20 MG/100ML; MG/100ML; MG/100ML; MG/100ML
INJECTION, SOLUTION INTRAVENOUS CONTINUOUS
Status: ACTIVE | OUTPATIENT
Start: 2025-05-20 | End: 2025-05-21

## 2025-05-20 RX ORDER — HYDROMORPHONE HYDROCHLORIDE 2 MG/ML
2 INJECTION, SOLUTION INTRAMUSCULAR; INTRAVENOUS; SUBCUTANEOUS EVERY 4 HOURS PRN
Status: DISCONTINUED | OUTPATIENT
Start: 2025-05-20 | End: 2025-05-20

## 2025-05-20 RX ORDER — WARFARIN 2.5 MG/1
2.5 TABLET ORAL DAILY
Status: DISCONTINUED | OUTPATIENT
Start: 2025-05-20 | End: 2025-05-22

## 2025-05-20 RX ORDER — HYDROCODONE BITARTRATE AND ACETAMINOPHEN 10; 325 MG/1; MG/1
1 TABLET ORAL EVERY 6 HOURS PRN
Refills: 0 | Status: DISCONTINUED | OUTPATIENT
Start: 2025-05-20 | End: 2025-05-30 | Stop reason: HOSPADM

## 2025-05-20 RX ORDER — SENNOSIDES 8.6 MG/1
TABLET ORAL
Status: DISPENSED
Start: 2025-05-20 | End: 2025-05-21

## 2025-05-20 RX ORDER — GABAPENTIN 300 MG/1
CAPSULE ORAL
Status: COMPLETED
Start: 2025-05-20 | End: 2025-05-20

## 2025-05-20 RX ORDER — SODIUM CHLORIDE 0.9 % (FLUSH) 0.9 %
10 SYRINGE (ML) INJECTION EVERY 12 HOURS PRN
Status: DISCONTINUED | OUTPATIENT
Start: 2025-05-20 | End: 2025-05-30 | Stop reason: HOSPADM

## 2025-05-20 RX ADMIN — POTASSIUM BICARBONATE 40 MEQ: 391 TABLET, EFFERVESCENT ORAL at 09:05

## 2025-05-20 RX ADMIN — SODIUM CHLORIDE, POTASSIUM CHLORIDE, SODIUM LACTATE AND CALCIUM CHLORIDE 75 ML/HR: 600; 310; 30; 20 INJECTION, SOLUTION INTRAVENOUS at 06:05

## 2025-05-20 RX ADMIN — DIPHENHYDRAMINE HYDROCHLORIDE 25 MG: 25 CAPSULE ORAL at 01:05

## 2025-05-20 RX ADMIN — HYDROMORPHONE HYDROCHLORIDE 2 MG: 2 INJECTION, SOLUTION INTRAMUSCULAR; INTRAVENOUS; SUBCUTANEOUS at 04:05

## 2025-05-20 RX ADMIN — ONDANSETRON 4 MG: 2 INJECTION INTRAMUSCULAR; INTRAVENOUS at 01:05

## 2025-05-20 RX ADMIN — HYDROMORPHONE HYDROCHLORIDE 1 MG: 1 INJECTION, SOLUTION INTRAMUSCULAR; INTRAVENOUS; SUBCUTANEOUS at 01:05

## 2025-05-20 RX ADMIN — WARFARIN SODIUM 2.5 MG: 2.5 TABLET ORAL at 07:05

## 2025-05-20 RX ADMIN — GABAPENTIN 900 MG: 300 CAPSULE ORAL at 09:05

## 2025-05-20 RX ADMIN — SODIUM CHLORIDE, POTASSIUM CHLORIDE, SODIUM LACTATE AND CALCIUM CHLORIDE 100 ML/HR: 600; 310; 30; 20 INJECTION, SOLUTION INTRAVENOUS at 06:05

## 2025-05-20 RX ADMIN — SENNOSIDES AND DOCUSATE SODIUM 1 TABLET: 50; 8.6 TABLET ORAL at 09:05

## 2025-05-20 RX ADMIN — MORPHINE SULFATE 30 MG: 15 TABLET, FILM COATED, EXTENDED RELEASE ORAL at 09:05

## 2025-05-20 RX ADMIN — HYDROMORPHONE HYDROCHLORIDE 1 MG: 1 INJECTION, SOLUTION INTRAMUSCULAR; INTRAVENOUS; SUBCUTANEOUS at 02:05

## 2025-05-20 RX ADMIN — ACETAMINOPHEN 1000 MG: 500 TABLET ORAL at 09:05

## 2025-05-20 RX ADMIN — CEFTRIAXONE SODIUM 2 G: 2 INJECTION, POWDER, FOR SOLUTION INTRAMUSCULAR; INTRAVENOUS at 07:05

## 2025-05-20 RX ADMIN — TIZANIDINE 4 MG: 2 TABLET ORAL at 11:05

## 2025-05-20 RX ADMIN — DROPERIDOL 1.25 MG: 2.5 INJECTION, SOLUTION INTRAMUSCULAR; INTRAVENOUS at 02:05

## 2025-05-20 RX ADMIN — HYDROMORPHONE HYDROCHLORIDE 2 MG: 2 INJECTION INTRAMUSCULAR; INTRAVENOUS; SUBCUTANEOUS at 11:05

## 2025-05-20 RX ADMIN — PROMETHAZINE HYDROCHLORIDE 6.25 MG: 25 INJECTION INTRAMUSCULAR; INTRAVENOUS at 07:05

## 2025-05-20 RX ADMIN — POTASSIUM BICARBONATE 40 MEQ: 391 TABLET, EFFERVESCENT ORAL at 04:05

## 2025-05-20 RX ADMIN — HYDROMORPHONE HYDROCHLORIDE 2 MG: 2 INJECTION INTRAMUSCULAR; INTRAVENOUS; SUBCUTANEOUS at 07:05

## 2025-05-20 RX ADMIN — SODIUM CHLORIDE 1000 ML: 0.9 INJECTION, SOLUTION INTRAVENOUS at 02:05

## 2025-05-20 NOTE — TELEPHONE ENCOUNTER
----- Message from Linda sent at 5/20/2025  9:20 AM CDT -----  Regarding: Pain Manangement  Contact: Pt @ [phone]  Pt is asking to speak with someone in the office to be seen today for her pain after both visits. Please c/b to advise..thanks

## 2025-05-20 NOTE — TELEPHONE ENCOUNTER
Returned call to patient to advise that our infusion center has access for this afternoon but insurance benefits are giving message that they are out of network. Patient denies any change in insurance at present.

## 2025-05-21 DIAGNOSIS — D57.00 SICKLE CELL PAIN CRISIS: ICD-10-CM

## 2025-05-21 PROBLEM — I82.C11 ACUTE INTERNAL JUGULAR VEIN THROMBOSIS, RIGHT: Status: RESOLVED | Noted: 2025-04-05 | Resolved: 2025-05-21

## 2025-05-21 LAB
ABSOLUTE EOSINOPHIL (OHS): 0.14 K/UL
ABSOLUTE MONOCYTE (OHS): 1.44 K/UL (ref 0.3–1)
ABSOLUTE NEUTROPHIL COUNT (OHS): 4.71 K/UL (ref 1.8–7.7)
ALBUMIN SERPL BCP-MCNC: 3.4 G/DL (ref 3.5–5.2)
ALP SERPL-CCNC: 88 UNIT/L (ref 40–150)
ALT SERPL W/O P-5'-P-CCNC: 12 UNIT/L (ref 10–44)
ANION GAP (OHS): 8 MMOL/L (ref 8–16)
AST SERPL-CCNC: 18 UNIT/L (ref 11–45)
BASOPHILS # BLD AUTO: 0.07 K/UL
BASOPHILS NFR BLD AUTO: 0.7 %
BILIRUB SERPL-MCNC: 0.7 MG/DL (ref 0.1–1)
BUN SERPL-MCNC: 11 MG/DL (ref 6–20)
CALCIUM SERPL-MCNC: 9.2 MG/DL (ref 8.7–10.5)
CHLORIDE SERPL-SCNC: 105 MMOL/L (ref 95–110)
CO2 SERPL-SCNC: 24 MMOL/L (ref 23–29)
CREAT SERPL-MCNC: 1 MG/DL (ref 0.5–1.4)
ERYTHROCYTE [DISTWIDTH] IN BLOOD BY AUTOMATED COUNT: 22.1 % (ref 11.5–14.5)
GFR SERPLBLD CREATININE-BSD FMLA CKD-EPI: >60 ML/MIN/1.73/M2
GLUCOSE SERPL-MCNC: 97 MG/DL (ref 70–110)
HCT VFR BLD AUTO: 29 % (ref 37–48.5)
HGB BLD-MCNC: 9.4 GM/DL (ref 12–16)
IMM GRANULOCYTES # BLD AUTO: 0.04 K/UL (ref 0–0.04)
IMM GRANULOCYTES NFR BLD AUTO: 0.4 % (ref 0–0.5)
INDIRECT COOMBS: NORMAL
INR PPP: 3 (ref 0.8–1.2)
LYMPHOCYTES # BLD AUTO: 4.33 K/UL (ref 1–4.8)
MAGNESIUM SERPL-MCNC: 1.9 MG/DL (ref 1.6–2.6)
MCH RBC QN AUTO: 21.8 PG (ref 27–31)
MCHC RBC AUTO-ENTMCNC: 32.4 G/DL (ref 32–36)
MCV RBC AUTO: 67 FL (ref 82–98)
NUCLEATED RBC (/100WBC) (OHS): 1 /100 WBC
PHOSPHATE SERPL-MCNC: 3.1 MG/DL (ref 2.7–4.5)
PLATELET # BLD AUTO: 241 K/UL (ref 150–450)
PMV BLD AUTO: 9.4 FL (ref 9.2–12.9)
POTASSIUM SERPL-SCNC: 3.9 MMOL/L (ref 3.5–5.1)
PROT SERPL-MCNC: 7.3 GM/DL (ref 6–8.4)
PROTHROMBIN TIME: 30.3 SECONDS (ref 9–12.5)
RBC # BLD AUTO: 4.32 M/UL (ref 4–5.4)
RELATIVE EOSINOPHIL (OHS): 1.3 %
RELATIVE LYMPHOCYTE (OHS): 40.4 % (ref 18–48)
RELATIVE MONOCYTE (OHS): 13.4 % (ref 4–15)
RELATIVE NEUTROPHIL (OHS): 43.8 % (ref 38–73)
RH BLD: NORMAL
SODIUM SERPL-SCNC: 137 MMOL/L (ref 136–145)
SPECIMEN OUTDATE: NORMAL
WBC # BLD AUTO: 10.73 K/UL (ref 3.9–12.7)

## 2025-05-21 PROCEDURE — 25000003 PHARM REV CODE 250: Performed by: NURSE PRACTITIONER

## 2025-05-21 PROCEDURE — 84100 ASSAY OF PHOSPHORUS: CPT

## 2025-05-21 PROCEDURE — 99285 EMERGENCY DEPT VISIT HI MDM: CPT

## 2025-05-21 PROCEDURE — 63600175 PHARM REV CODE 636 W HCPCS

## 2025-05-21 PROCEDURE — 12000002 HC ACUTE/MED SURGE SEMI-PRIVATE ROOM

## 2025-05-21 PROCEDURE — 63600175 PHARM REV CODE 636 W HCPCS: Performed by: EMERGENCY MEDICINE

## 2025-05-21 PROCEDURE — 25000003 PHARM REV CODE 250

## 2025-05-21 PROCEDURE — 85025 COMPLETE CBC W/AUTO DIFF WBC: CPT

## 2025-05-21 PROCEDURE — 83735 ASSAY OF MAGNESIUM: CPT

## 2025-05-21 PROCEDURE — 25000242 PHARM REV CODE 250 ALT 637 W/ HCPCS

## 2025-05-21 PROCEDURE — 85610 PROTHROMBIN TIME: CPT | Performed by: STUDENT IN AN ORGANIZED HEALTH CARE EDUCATION/TRAINING PROGRAM

## 2025-05-21 PROCEDURE — 63600175 PHARM REV CODE 636 W HCPCS: Performed by: NURSE PRACTITIONER

## 2025-05-21 PROCEDURE — 80053 COMPREHEN METABOLIC PANEL: CPT

## 2025-05-21 RX ORDER — PROMETHAZINE HYDROCHLORIDE 12.5 MG/1
25 TABLET ORAL EVERY 6 HOURS PRN
Status: DISCONTINUED | OUTPATIENT
Start: 2025-05-21 | End: 2025-05-30 | Stop reason: HOSPADM

## 2025-05-21 RX ORDER — SODIUM CHLORIDE 9 MG/ML
INJECTION, SOLUTION INTRAVENOUS CONTINUOUS
Status: DISCONTINUED | OUTPATIENT
Start: 2025-05-21 | End: 2025-05-27

## 2025-05-21 RX ORDER — ACETAMINOPHEN 500 MG
1000 TABLET ORAL EVERY 8 HOURS
Status: DISCONTINUED | OUTPATIENT
Start: 2025-05-21 | End: 2025-05-30 | Stop reason: HOSPADM

## 2025-05-21 RX ORDER — GABAPENTIN 400 MG/1
800 CAPSULE ORAL 3 TIMES DAILY
Status: DISCONTINUED | OUTPATIENT
Start: 2025-05-21 | End: 2025-05-30 | Stop reason: HOSPADM

## 2025-05-21 RX ORDER — ASCORBIC ACID 500 MG
500 TABLET ORAL DAILY
Status: DISCONTINUED | OUTPATIENT
Start: 2025-05-22 | End: 2025-05-30 | Stop reason: HOSPADM

## 2025-05-21 RX ORDER — PRAZOSIN HYDROCHLORIDE 1 MG/1
1 CAPSULE ORAL NIGHTLY
Status: DISCONTINUED | OUTPATIENT
Start: 2025-05-21 | End: 2025-05-30 | Stop reason: HOSPADM

## 2025-05-21 RX ORDER — MUPIROCIN 20 MG/G
OINTMENT TOPICAL 2 TIMES DAILY
Status: DISPENSED | OUTPATIENT
Start: 2025-05-21 | End: 2025-05-26

## 2025-05-21 RX ORDER — GABAPENTIN 300 MG/1
300 CAPSULE ORAL 3 TIMES DAILY
Status: DISCONTINUED | OUTPATIENT
Start: 2025-05-21 | End: 2025-05-21

## 2025-05-21 RX ORDER — DIPHENHYDRAMINE HYDROCHLORIDE 50 MG/ML
25 INJECTION, SOLUTION INTRAMUSCULAR; INTRAVENOUS EVERY 4 HOURS PRN
Status: DISCONTINUED | OUTPATIENT
Start: 2025-05-21 | End: 2025-05-22

## 2025-05-21 RX ORDER — CEFTRIAXONE 2 G/1
2 INJECTION, POWDER, FOR SOLUTION INTRAMUSCULAR; INTRAVENOUS
Status: DISCONTINUED | OUTPATIENT
Start: 2025-05-21 | End: 2025-05-21

## 2025-05-21 RX ORDER — DIPHENHYDRAMINE HYDROCHLORIDE 50 MG/ML
25 INJECTION, SOLUTION INTRAMUSCULAR; INTRAVENOUS
Status: COMPLETED | OUTPATIENT
Start: 2025-05-21 | End: 2025-05-21

## 2025-05-21 RX ORDER — METHOCARBAMOL 500 MG/1
500 TABLET, FILM COATED ORAL 4 TIMES DAILY
Status: DISCONTINUED | OUTPATIENT
Start: 2025-05-21 | End: 2025-05-30 | Stop reason: HOSPADM

## 2025-05-21 RX ADMIN — GABAPENTIN 900 MG: 300 CAPSULE ORAL at 01:05

## 2025-05-21 RX ADMIN — DIPHENHYDRAMINE HYDROCHLORIDE 25 MG: 50 INJECTION, SOLUTION INTRAMUSCULAR; INTRAVENOUS at 09:05

## 2025-05-21 RX ADMIN — HYDROMORPHONE HYDROCHLORIDE 2 MG: 2 INJECTION INTRAMUSCULAR; INTRAVENOUS; SUBCUTANEOUS at 03:05

## 2025-05-21 RX ADMIN — MORPHINE SULFATE 30 MG: 15 TABLET, FILM COATED, EXTENDED RELEASE ORAL at 08:05

## 2025-05-21 RX ADMIN — ACETAMINOPHEN 1000 MG: 500 TABLET, FILM COATED ORAL at 09:05

## 2025-05-21 RX ADMIN — SODIUM CHLORIDE: 9 INJECTION, SOLUTION INTRAVENOUS at 06:05

## 2025-05-21 RX ADMIN — WARFARIN SODIUM 2.5 MG: 2.5 TABLET ORAL at 05:05

## 2025-05-21 RX ADMIN — FOLIC ACID 1 MG: 1 TABLET ORAL at 08:05

## 2025-05-21 RX ADMIN — ACETAMINOPHEN 1000 MG: 500 TABLET ORAL at 05:05

## 2025-05-21 RX ADMIN — AMLODIPINE BESYLATE 10 MG: 10 TABLET ORAL at 08:05

## 2025-05-21 RX ADMIN — TIZANIDINE 4 MG: 2 TABLET ORAL at 03:05

## 2025-05-21 RX ADMIN — SENNOSIDES AND DOCUSATE SODIUM 1 TABLET: 50; 8.6 TABLET ORAL at 08:05

## 2025-05-21 RX ADMIN — GABAPENTIN 900 MG: 300 CAPSULE ORAL at 05:05

## 2025-05-21 RX ADMIN — HYDROMORPHONE HYDROCHLORIDE 2 MG: 2 INJECTION INTRAMUSCULAR; INTRAVENOUS; SUBCUTANEOUS at 09:05

## 2025-05-21 RX ADMIN — FLUOXETINE HYDROCHLORIDE 80 MG: 20 CAPSULE ORAL at 08:05

## 2025-05-21 RX ADMIN — FLUTICASONE PROPIONATE 50 MCG: 50 SPRAY, METERED NASAL at 09:05

## 2025-05-21 RX ADMIN — HYDROMORPHONE HYDROCHLORIDE 2 MG: 2 INJECTION INTRAMUSCULAR; INTRAVENOUS; SUBCUTANEOUS at 05:05

## 2025-05-21 RX ADMIN — PRAZOSIN HYDROCHLORIDE 1 MG: 1 CAPSULE ORAL at 09:05

## 2025-05-21 RX ADMIN — HYDROMORPHONE HYDROCHLORIDE 2 MG: 2 INJECTION INTRAMUSCULAR; INTRAVENOUS; SUBCUTANEOUS at 12:05

## 2025-05-21 RX ADMIN — ACETAMINOPHEN 1000 MG: 500 TABLET ORAL at 01:05

## 2025-05-21 RX ADMIN — GABAPENTIN 800 MG: 400 CAPSULE ORAL at 08:05

## 2025-05-21 RX ADMIN — HYDROMORPHONE HYDROCHLORIDE 2 MG: 2 INJECTION INTRAMUSCULAR; INTRAVENOUS; SUBCUTANEOUS at 06:05

## 2025-05-21 RX ADMIN — TIZANIDINE 4 MG: 2 TABLET ORAL at 11:05

## 2025-05-21 RX ADMIN — TIZANIDINE 4 MG: 2 TABLET ORAL at 05:05

## 2025-05-21 RX ADMIN — DIPHENHYDRAMINE HYDROCHLORIDE 25 MG: 50 INJECTION, SOLUTION INTRAMUSCULAR; INTRAVENOUS at 05:05

## 2025-05-21 RX ADMIN — PANTOPRAZOLE SODIUM 40 MG: 40 TABLET, DELAYED RELEASE ORAL at 08:05

## 2025-05-21 RX ADMIN — METHOCARBAMOL 500 MG: 500 TABLET ORAL at 08:05

## 2025-05-21 NOTE — TELEPHONE ENCOUNTER
----- Message from Linda sent at 5/21/2025 11:45 AM CDT -----  Regarding: Refill Request  Contact: Pt @ [phone]   Is this a Refill or New Rx: Refill Rx Name and Strength:HYDROcodone-acetaminophen  mg per tablet 1 tablet   AND Tizanidine  Preferred Pharmacy with phone number: [facility] [facility] [address]: [phone] Fax: [phone]

## 2025-05-22 DIAGNOSIS — D57.00 HB-SS DISEASE WITH VASO-OCCLUSIVE PAIN: ICD-10-CM

## 2025-05-22 LAB
ABSOLUTE EOSINOPHIL (OHS): 0.32 K/UL
ABSOLUTE MONOCYTE (OHS): 0.9 K/UL (ref 0.3–1)
ABSOLUTE NEUTROPHIL COUNT (OHS): 1.51 K/UL (ref 1.8–7.7)
ALBUMIN SERPL BCP-MCNC: 3.3 G/DL (ref 3.5–5.2)
ALP SERPL-CCNC: 80 UNIT/L (ref 40–150)
ALT SERPL W/O P-5'-P-CCNC: 19 UNIT/L (ref 10–44)
ANION GAP (OHS): 6 MMOL/L (ref 8–16)
AST SERPL-CCNC: 30 UNIT/L (ref 11–45)
BASOPHILS # BLD AUTO: 0.06 K/UL
BASOPHILS NFR BLD AUTO: 0.9 %
BILIRUB SERPL-MCNC: 0.3 MG/DL (ref 0.1–1)
BUN SERPL-MCNC: 15 MG/DL (ref 6–20)
CALCIUM SERPL-MCNC: 8.8 MG/DL (ref 8.7–10.5)
CHLORIDE SERPL-SCNC: 109 MMOL/L (ref 95–110)
CO2 SERPL-SCNC: 24 MMOL/L (ref 23–29)
CREAT SERPL-MCNC: 1.2 MG/DL (ref 0.5–1.4)
ERYTHROCYTE [DISTWIDTH] IN BLOOD BY AUTOMATED COUNT: 22.5 % (ref 11.5–14.5)
GFR SERPLBLD CREATININE-BSD FMLA CKD-EPI: 56 ML/MIN/1.73/M2
GLUCOSE SERPL-MCNC: 123 MG/DL (ref 70–110)
HCT VFR BLD AUTO: 29.6 % (ref 37–48.5)
HGB BLD-MCNC: 9.5 GM/DL (ref 12–16)
HGB S MFR BLD ELPH: 57.9 %
IMM GRANULOCYTES # BLD AUTO: 0 K/UL (ref 0–0.04)
IMM GRANULOCYTES NFR BLD AUTO: 0 % (ref 0–0.5)
INDIRECT COOMBS: NORMAL
INR PPP: 3.7 (ref 0.8–1.2)
LYMPHOCYTES # BLD AUTO: 4.19 K/UL (ref 1–4.8)
MAGNESIUM SERPL-MCNC: 1.8 MG/DL (ref 1.6–2.6)
MCH RBC QN AUTO: 21.9 PG (ref 27–31)
MCHC RBC AUTO-ENTMCNC: 32.1 G/DL (ref 32–36)
MCV RBC AUTO: 68 FL (ref 82–98)
NUCLEATED RBC (/100WBC) (OHS): 2 /100 WBC
PATHOLOGIST INTERPRETATION - HGB SERUM (OHS): NORMAL
PHOSPHATE SERPL-MCNC: 4 MG/DL (ref 2.7–4.5)
PLATELET # BLD AUTO: 256 K/UL (ref 150–450)
PMV BLD AUTO: 9.1 FL (ref 9.2–12.9)
POTASSIUM SERPL-SCNC: 4.2 MMOL/L (ref 3.5–5.1)
PROT SERPL-MCNC: 6.9 GM/DL (ref 6–8.4)
PROTHROMBIN TIME: 38.1 SECONDS (ref 9–12.5)
RBC # BLD AUTO: 4.33 M/UL (ref 4–5.4)
RELATIVE EOSINOPHIL (OHS): 4.6 %
RELATIVE LYMPHOCYTE (OHS): 60 % (ref 18–48)
RELATIVE MONOCYTE (OHS): 12.9 % (ref 4–15)
RELATIVE NEUTROPHIL (OHS): 21.6 % (ref 38–73)
RH BLD: NORMAL
SODIUM SERPL-SCNC: 139 MMOL/L (ref 136–145)
SPECIMEN OUTDATE: NORMAL
WBC # BLD AUTO: 6.98 K/UL (ref 3.9–12.7)

## 2025-05-22 PROCEDURE — 86922 COMPATIBILITY TEST ANTIGLOB: CPT | Performed by: PATHOLOGY

## 2025-05-22 PROCEDURE — 63600175 PHARM REV CODE 636 W HCPCS: Performed by: STUDENT IN AN ORGANIZED HEALTH CARE EDUCATION/TRAINING PROGRAM

## 2025-05-22 PROCEDURE — 84100 ASSAY OF PHOSPHORUS: CPT

## 2025-05-22 PROCEDURE — 25000003 PHARM REV CODE 250

## 2025-05-22 PROCEDURE — C1751 CATH, INF, PER/CENT/MIDLINE: HCPCS

## 2025-05-22 PROCEDURE — 25000003 PHARM REV CODE 250: Performed by: STUDENT IN AN ORGANIZED HEALTH CARE EDUCATION/TRAINING PROGRAM

## 2025-05-22 PROCEDURE — 80505 PATH CLIN CONSLTJ HIGH 41-60: CPT | Mod: ,,, | Performed by: PATHOLOGY

## 2025-05-22 PROCEDURE — 36415 COLL VENOUS BLD VENIPUNCTURE: CPT

## 2025-05-22 PROCEDURE — 82435 ASSAY OF BLOOD CHLORIDE: CPT

## 2025-05-22 PROCEDURE — 85610 PROTHROMBIN TIME: CPT | Performed by: STUDENT IN AN ORGANIZED HEALTH CARE EDUCATION/TRAINING PROGRAM

## 2025-05-22 PROCEDURE — 63600175 PHARM REV CODE 636 W HCPCS: Performed by: NURSE PRACTITIONER

## 2025-05-22 PROCEDURE — 25000003 PHARM REV CODE 250: Performed by: NURSE PRACTITIONER

## 2025-05-22 PROCEDURE — 83735 ASSAY OF MAGNESIUM: CPT

## 2025-05-22 PROCEDURE — 76937 US GUIDE VASCULAR ACCESS: CPT

## 2025-05-22 PROCEDURE — 36410 VNPNXR 3YR/> PHY/QHP DX/THER: CPT

## 2025-05-22 PROCEDURE — 36415 COLL VENOUS BLD VENIPUNCTURE: CPT | Performed by: STUDENT IN AN ORGANIZED HEALTH CARE EDUCATION/TRAINING PROGRAM

## 2025-05-22 PROCEDURE — 83020 HEMOGLOBIN ELECTROPHORESIS: CPT | Performed by: STUDENT IN AN ORGANIZED HEALTH CARE EDUCATION/TRAINING PROGRAM

## 2025-05-22 PROCEDURE — 86900 BLOOD TYPING SEROLOGIC ABO: CPT | Performed by: STUDENT IN AN ORGANIZED HEALTH CARE EDUCATION/TRAINING PROGRAM

## 2025-05-22 PROCEDURE — 63600175 PHARM REV CODE 636 W HCPCS

## 2025-05-22 PROCEDURE — 85025 COMPLETE CBC W/AUTO DIFF WBC: CPT

## 2025-05-22 PROCEDURE — 21400001 HC TELEMETRY ROOM

## 2025-05-22 RX ORDER — DIPHENHYDRAMINE HYDROCHLORIDE 50 MG/ML
50 INJECTION, SOLUTION INTRAMUSCULAR; INTRAVENOUS EVERY 6 HOURS PRN
Status: DISCONTINUED | OUTPATIENT
Start: 2025-05-22 | End: 2025-05-28

## 2025-05-22 RX ORDER — HYDROCODONE BITARTRATE AND ACETAMINOPHEN 10; 325 MG/1; MG/1
1 TABLET ORAL EVERY 6 HOURS PRN
Qty: 120 TABLET | Refills: 0 | Status: ON HOLD | OUTPATIENT
Start: 2025-05-22 | End: 2025-06-21

## 2025-05-22 RX ORDER — HYDROMORPHONE HYDROCHLORIDE 1 MG/ML
2 INJECTION, SOLUTION INTRAMUSCULAR; INTRAVENOUS; SUBCUTANEOUS
Refills: 0 | Status: DISCONTINUED | OUTPATIENT
Start: 2025-05-22 | End: 2025-05-22

## 2025-05-22 RX ORDER — CALCIUM GLUCONATE 20 MG/ML
2 INJECTION, SOLUTION INTRAVENOUS ONCE
Status: COMPLETED | OUTPATIENT
Start: 2025-05-23 | End: 2025-05-23

## 2025-05-22 RX ORDER — DIPHENHYDRAMINE HYDROCHLORIDE 50 MG/ML
25 INJECTION, SOLUTION INTRAMUSCULAR; INTRAVENOUS
Status: DISCONTINUED | OUTPATIENT
Start: 2025-05-22 | End: 2025-05-22

## 2025-05-22 RX ORDER — ACETAMINOPHEN 325 MG/1
650 TABLET ORAL ONCE
Status: COMPLETED | OUTPATIENT
Start: 2025-05-23 | End: 2025-05-23

## 2025-05-22 RX ORDER — HEPARIN SODIUM 1000 [USP'U]/ML
4000 INJECTION, SOLUTION INTRAVENOUS; SUBCUTANEOUS ONCE
Status: COMPLETED | OUTPATIENT
Start: 2025-05-23 | End: 2025-05-23

## 2025-05-22 RX ORDER — DIPHENHYDRAMINE HYDROCHLORIDE 50 MG/ML
50 INJECTION, SOLUTION INTRAMUSCULAR; INTRAVENOUS ONCE
Status: COMPLETED | OUTPATIENT
Start: 2025-05-23 | End: 2025-05-23

## 2025-05-22 RX ORDER — HYDROMORPHONE HYDROCHLORIDE 1 MG/ML
3 INJECTION, SOLUTION INTRAMUSCULAR; INTRAVENOUS; SUBCUTANEOUS
Status: DISCONTINUED | OUTPATIENT
Start: 2025-05-22 | End: 2025-05-30 | Stop reason: HOSPADM

## 2025-05-22 RX ADMIN — TIZANIDINE 4 MG: 2 TABLET ORAL at 05:05

## 2025-05-22 RX ADMIN — PANTOPRAZOLE SODIUM 40 MG: 40 TABLET, DELAYED RELEASE ORAL at 08:05

## 2025-05-22 RX ADMIN — FLUOXETINE HYDROCHLORIDE 80 MG: 20 CAPSULE ORAL at 08:05

## 2025-05-22 RX ADMIN — SENNOSIDES AND DOCUSATE SODIUM 1 TABLET: 50; 8.6 TABLET ORAL at 09:05

## 2025-05-22 RX ADMIN — METHOCARBAMOL 500 MG: 500 TABLET ORAL at 09:05

## 2025-05-22 RX ADMIN — GABAPENTIN 800 MG: 400 CAPSULE ORAL at 09:05

## 2025-05-22 RX ADMIN — MORPHINE SULFATE 30 MG: 15 TABLET, FILM COATED, EXTENDED RELEASE ORAL at 08:05

## 2025-05-22 RX ADMIN — FOLIC ACID 1 MG: 1 TABLET ORAL at 08:05

## 2025-05-22 RX ADMIN — GABAPENTIN 800 MG: 400 CAPSULE ORAL at 02:05

## 2025-05-22 RX ADMIN — AMLODIPINE BESYLATE 10 MG: 10 TABLET ORAL at 08:05

## 2025-05-22 RX ADMIN — PRAZOSIN HYDROCHLORIDE 1 MG: 1 CAPSULE ORAL at 09:05

## 2025-05-22 RX ADMIN — DIPHENHYDRAMINE HYDROCHLORIDE 25 MG: 50 INJECTION, SOLUTION INTRAMUSCULAR; INTRAVENOUS at 01:05

## 2025-05-22 RX ADMIN — SODIUM CHLORIDE: 9 INJECTION, SOLUTION INTRAVENOUS at 11:05

## 2025-05-22 RX ADMIN — HYDROMORPHONE HYDROCHLORIDE 3 MG: 1 INJECTION, SOLUTION INTRAMUSCULAR; INTRAVENOUS; SUBCUTANEOUS at 08:05

## 2025-05-22 RX ADMIN — MUPIROCIN: 20 OINTMENT TOPICAL at 08:05

## 2025-05-22 RX ADMIN — GABAPENTIN 800 MG: 400 CAPSULE ORAL at 08:05

## 2025-05-22 RX ADMIN — OXYCODONE HYDROCHLORIDE AND ACETAMINOPHEN 500 MG: 500 TABLET ORAL at 08:05

## 2025-05-22 RX ADMIN — SODIUM CHLORIDE: 9 INJECTION, SOLUTION INTRAVENOUS at 04:05

## 2025-05-22 RX ADMIN — HYDROMORPHONE HYDROCHLORIDE 3 MG: 1 INJECTION, SOLUTION INTRAMUSCULAR; INTRAVENOUS; SUBCUTANEOUS at 05:05

## 2025-05-22 RX ADMIN — HYDROMORPHONE HYDROCHLORIDE 3 MG: 1 INJECTION, SOLUTION INTRAMUSCULAR; INTRAVENOUS; SUBCUTANEOUS at 10:05

## 2025-05-22 RX ADMIN — TIZANIDINE 4 MG: 2 TABLET ORAL at 08:05

## 2025-05-22 RX ADMIN — HYDROMORPHONE HYDROCHLORIDE 3 MG: 1 INJECTION, SOLUTION INTRAMUSCULAR; INTRAVENOUS; SUBCUTANEOUS at 01:05

## 2025-05-22 RX ADMIN — DIPHENHYDRAMINE HYDROCHLORIDE 50 MG: 50 INJECTION, SOLUTION INTRAMUSCULAR; INTRAVENOUS at 08:05

## 2025-05-22 RX ADMIN — METHOCARBAMOL 500 MG: 500 TABLET ORAL at 01:05

## 2025-05-22 RX ADMIN — SODIUM CHLORIDE: 9 INJECTION, SOLUTION INTRAVENOUS at 02:05

## 2025-05-22 RX ADMIN — HYDROMORPHONE HYDROCHLORIDE 2 MG: 1 INJECTION, SOLUTION INTRAMUSCULAR; INTRAVENOUS; SUBCUTANEOUS at 07:05

## 2025-05-22 RX ADMIN — ACETAMINOPHEN 1000 MG: 500 TABLET, FILM COATED ORAL at 05:05

## 2025-05-22 RX ADMIN — DIPHENHYDRAMINE HYDROCHLORIDE 50 MG: 50 INJECTION, SOLUTION INTRAMUSCULAR; INTRAVENOUS at 02:05

## 2025-05-22 RX ADMIN — HYDROMORPHONE HYDROCHLORIDE 2 MG: 1 INJECTION, SOLUTION INTRAMUSCULAR; INTRAVENOUS; SUBCUTANEOUS at 04:05

## 2025-05-22 RX ADMIN — ACETAMINOPHEN 1000 MG: 500 TABLET, FILM COATED ORAL at 01:05

## 2025-05-22 RX ADMIN — HYDROMORPHONE HYDROCHLORIDE 3 MG: 1 INJECTION, SOLUTION INTRAMUSCULAR; INTRAVENOUS; SUBCUTANEOUS at 11:05

## 2025-05-22 RX ADMIN — ACETAMINOPHEN 1000 MG: 500 TABLET, FILM COATED ORAL at 09:05

## 2025-05-22 RX ADMIN — MORPHINE SULFATE 30 MG: 15 TABLET, FILM COATED, EXTENDED RELEASE ORAL at 09:05

## 2025-05-22 RX ADMIN — MUPIROCIN: 20 OINTMENT TOPICAL at 09:05

## 2025-05-22 RX ADMIN — SENNOSIDES AND DOCUSATE SODIUM 1 TABLET: 50; 8.6 TABLET ORAL at 08:05

## 2025-05-22 RX ADMIN — DIPHENHYDRAMINE HYDROCHLORIDE 25 MG: 50 INJECTION, SOLUTION INTRAMUSCULAR; INTRAVENOUS at 05:05

## 2025-05-22 RX ADMIN — HYDROMORPHONE HYDROCHLORIDE 2 MG: 1 INJECTION, SOLUTION INTRAMUSCULAR; INTRAVENOUS; SUBCUTANEOUS at 01:05

## 2025-05-22 NOTE — PROGRESS NOTES
Patient is currently admitted. Will defer warfarin care to inpatient team and resume care once patient is discharged home.

## 2025-05-23 LAB
ABO + RH BLD: NORMAL
ABSOLUTE EOSINOPHIL (OHS): 0.37 K/UL
ABSOLUTE MONOCYTE (OHS): 0.83 K/UL (ref 0.3–1)
ABSOLUTE NEUTROPHIL COUNT (OHS): 2.07 K/UL (ref 1.8–7.7)
ALBUMIN SERPL BCP-MCNC: 3.3 G/DL (ref 3.5–5.2)
ALP SERPL-CCNC: 79 UNIT/L (ref 40–150)
ALT SERPL W/O P-5'-P-CCNC: 18 UNIT/L (ref 10–44)
ANION GAP (OHS): 7 MMOL/L (ref 8–16)
AST SERPL-CCNC: 27 UNIT/L (ref 11–45)
BASOPHILS # BLD AUTO: 0.05 K/UL
BASOPHILS NFR BLD AUTO: 0.7 %
BILIRUB SERPL-MCNC: 0.3 MG/DL (ref 0.1–1)
BLD PROD TYP BPU: NORMAL
BLOOD UNIT EXPIRATION DATE: NORMAL
BLOOD UNIT TYPE CODE: 5100
BUN SERPL-MCNC: 11 MG/DL (ref 6–20)
CALCIUM SERPL-MCNC: 8.8 MG/DL (ref 8.7–10.5)
CHLORIDE SERPL-SCNC: 108 MMOL/L (ref 95–110)
CO2 SERPL-SCNC: 25 MMOL/L (ref 23–29)
CREAT SERPL-MCNC: 1.1 MG/DL (ref 0.5–1.4)
CROSSMATCH INTERPRETATION: NORMAL
DISPENSE STATUS: NORMAL
ERYTHROCYTE [DISTWIDTH] IN BLOOD BY AUTOMATED COUNT: 22.4 % (ref 11.5–14.5)
GFR SERPLBLD CREATININE-BSD FMLA CKD-EPI: >60 ML/MIN/1.73/M2
GLUCOSE SERPL-MCNC: 115 MG/DL (ref 70–110)
HCT VFR BLD AUTO: 28.3 % (ref 37–48.5)
HGB BLD-MCNC: 9.1 GM/DL (ref 12–16)
HGB S MFR BLD ELPH: 58 %
IMM GRANULOCYTES # BLD AUTO: 0.01 K/UL (ref 0–0.04)
IMM GRANULOCYTES NFR BLD AUTO: 0.1 % (ref 0–0.5)
INR PPP: 2.4 (ref 0.8–1.2)
LYMPHOCYTES # BLD AUTO: 3.58 K/UL (ref 1–4.8)
MCH RBC QN AUTO: 22.1 PG (ref 27–31)
MCHC RBC AUTO-ENTMCNC: 32.2 G/DL (ref 32–36)
MCV RBC AUTO: 69 FL (ref 82–98)
NUCLEATED RBC (/100WBC) (OHS): 2 /100 WBC
PATHOLOGIST INTERPRETATION - HGB SERUM (OHS): NORMAL
PLATELET # BLD AUTO: 238 K/UL (ref 150–450)
PMV BLD AUTO: 9.1 FL (ref 9.2–12.9)
POTASSIUM SERPL-SCNC: 4.4 MMOL/L (ref 3.5–5.1)
PROT SERPL-MCNC: 7 GM/DL (ref 6–8.4)
PROTHROMBIN TIME: 25 SECONDS (ref 9–12.5)
RBC # BLD AUTO: 4.11 M/UL (ref 4–5.4)
RELATIVE EOSINOPHIL (OHS): 5.4 %
RELATIVE LYMPHOCYTE (OHS): 51.8 % (ref 18–48)
RELATIVE MONOCYTE (OHS): 12 % (ref 4–15)
RELATIVE NEUTROPHIL (OHS): 30 % (ref 38–73)
SODIUM SERPL-SCNC: 140 MMOL/L (ref 136–145)
UNIT NUMBER: NORMAL
V ADENOVIRUS: NOT DETECTED
V ENTEROVIRUS/RHINOVIRUS: NOT DETECTED
V HUMAN BOCAVIRUS: NOT DETECTED
V HUMAN CORONAVIRUS: NOT DETECTED
V HUMAN METAPNEUMOVIRUS: NOT DETECTED
V INFLUENZA A - H1N1-09: NOT DETECTED
V INFLUENZA A - HUMAN: NOT DETECTED
V INFLUENZA B: NOT DETECTED
V PARAINFLUENZA: NOT DETECTED
V RESPIRATORY SYNCYTIAL: NOT DETECTED
WBC # BLD AUTO: 6.91 K/UL (ref 3.9–12.7)

## 2025-05-23 PROCEDURE — 25000242 PHARM REV CODE 250 ALT 637 W/ HCPCS

## 2025-05-23 PROCEDURE — 80053 COMPREHEN METABOLIC PANEL: CPT

## 2025-05-23 PROCEDURE — 63600175 PHARM REV CODE 636 W HCPCS: Performed by: PATHOLOGY

## 2025-05-23 PROCEDURE — 85025 COMPLETE CBC W/AUTO DIFF WBC: CPT

## 2025-05-23 PROCEDURE — 21400001 HC TELEMETRY ROOM

## 2025-05-23 PROCEDURE — 6A550Z0 PHERESIS OF ERYTHROCYTES, SINGLE: ICD-10-PCS | Performed by: PATHOLOGY

## 2025-05-23 PROCEDURE — 25000003 PHARM REV CODE 250

## 2025-05-23 PROCEDURE — 30233N1 TRANSFUSION OF NONAUTOLOGOUS RED BLOOD CELLS INTO PERIPHERAL VEIN, PERCUTANEOUS APPROACH: ICD-10-PCS | Performed by: PATHOLOGY

## 2025-05-23 PROCEDURE — 25000003 PHARM REV CODE 250: Performed by: NURSE PRACTITIONER

## 2025-05-23 PROCEDURE — P9016 RBC LEUKOCYTES REDUCED: HCPCS | Performed by: PATHOLOGY

## 2025-05-23 PROCEDURE — 63600175 PHARM REV CODE 636 W HCPCS: Performed by: STUDENT IN AN ORGANIZED HEALTH CARE EDUCATION/TRAINING PROGRAM

## 2025-05-23 PROCEDURE — 25000003 PHARM REV CODE 250: Performed by: STUDENT IN AN ORGANIZED HEALTH CARE EDUCATION/TRAINING PROGRAM

## 2025-05-23 PROCEDURE — 25000003 PHARM REV CODE 250: Performed by: PATHOLOGY

## 2025-05-23 PROCEDURE — 36430 TRANSFUSION BLD/BLD COMPNT: CPT

## 2025-05-23 PROCEDURE — 85610 PROTHROMBIN TIME: CPT

## 2025-05-23 PROCEDURE — C1751 CATH, INF, PER/CENT/MIDLINE: HCPCS

## 2025-05-23 RX ORDER — TIZANIDINE 4 MG/1
4 TABLET ORAL EVERY 8 HOURS PRN
Qty: 21 TABLET | Refills: 0 | Status: SHIPPED | OUTPATIENT
Start: 2025-05-23 | End: 2025-05-30

## 2025-05-23 RX ORDER — WARFARIN 2.5 MG/1
2.5 TABLET ORAL DAILY
Status: DISCONTINUED | OUTPATIENT
Start: 2025-05-23 | End: 2025-05-26

## 2025-05-23 RX ADMIN — PRAZOSIN HYDROCHLORIDE 1 MG: 1 CAPSULE ORAL at 08:05

## 2025-05-23 RX ADMIN — OXYCODONE HYDROCHLORIDE AND ACETAMINOPHEN 500 MG: 500 TABLET ORAL at 08:05

## 2025-05-23 RX ADMIN — HYDROMORPHONE HYDROCHLORIDE 3 MG: 1 INJECTION, SOLUTION INTRAMUSCULAR; INTRAVENOUS; SUBCUTANEOUS at 05:05

## 2025-05-23 RX ADMIN — HYDROMORPHONE HYDROCHLORIDE 3 MG: 1 INJECTION, SOLUTION INTRAMUSCULAR; INTRAVENOUS; SUBCUTANEOUS at 12:05

## 2025-05-23 RX ADMIN — TIZANIDINE 4 MG: 2 TABLET ORAL at 06:05

## 2025-05-23 RX ADMIN — HYDROMORPHONE HYDROCHLORIDE 3 MG: 1 INJECTION, SOLUTION INTRAMUSCULAR; INTRAVENOUS; SUBCUTANEOUS at 06:05

## 2025-05-23 RX ADMIN — METHOCARBAMOL 500 MG: 500 TABLET ORAL at 08:05

## 2025-05-23 RX ADMIN — GABAPENTIN 800 MG: 400 CAPSULE ORAL at 08:05

## 2025-05-23 RX ADMIN — DIPHENHYDRAMINE HYDROCHLORIDE 50 MG: 50 INJECTION, SOLUTION INTRAMUSCULAR; INTRAVENOUS at 09:05

## 2025-05-23 RX ADMIN — ACETAMINOPHEN 650 MG: 325 TABLET, FILM COATED ORAL at 08:05

## 2025-05-23 RX ADMIN — PANTOPRAZOLE SODIUM 40 MG: 40 TABLET, DELAYED RELEASE ORAL at 08:05

## 2025-05-23 RX ADMIN — HYDROMORPHONE HYDROCHLORIDE 3 MG: 1 INJECTION, SOLUTION INTRAMUSCULAR; INTRAVENOUS; SUBCUTANEOUS at 03:05

## 2025-05-23 RX ADMIN — MORPHINE SULFATE 30 MG: 15 TABLET, FILM COATED, EXTENDED RELEASE ORAL at 08:05

## 2025-05-23 RX ADMIN — CALCIUM GLUCONATE 2 G: 20 INJECTION, SOLUTION INTRAVENOUS at 05:05

## 2025-05-23 RX ADMIN — HYDROMORPHONE HYDROCHLORIDE 3 MG: 1 INJECTION, SOLUTION INTRAMUSCULAR; INTRAVENOUS; SUBCUTANEOUS at 09:05

## 2025-05-23 RX ADMIN — FLUOXETINE HYDROCHLORIDE 80 MG: 20 CAPSULE ORAL at 08:05

## 2025-05-23 RX ADMIN — HYDROMORPHONE HYDROCHLORIDE 3 MG: 1 INJECTION, SOLUTION INTRAMUSCULAR; INTRAVENOUS; SUBCUTANEOUS at 02:05

## 2025-05-23 RX ADMIN — DIPHENHYDRAMINE HYDROCHLORIDE 50 MG: 50 INJECTION, SOLUTION INTRAMUSCULAR; INTRAVENOUS at 12:05

## 2025-05-23 RX ADMIN — SENNOSIDES AND DOCUSATE SODIUM 1 TABLET: 50; 8.6 TABLET ORAL at 08:05

## 2025-05-23 RX ADMIN — ACETAMINOPHEN 1000 MG: 500 TABLET, FILM COATED ORAL at 03:05

## 2025-05-23 RX ADMIN — ACETAMINOPHEN 1000 MG: 500 TABLET, FILM COATED ORAL at 05:05

## 2025-05-23 RX ADMIN — DIPHENHYDRAMINE HYDROCHLORIDE 50 MG: 50 INJECTION, SOLUTION INTRAMUSCULAR; INTRAVENOUS at 03:05

## 2025-05-23 RX ADMIN — ACETAMINOPHEN 1000 MG: 500 TABLET, FILM COATED ORAL at 09:05

## 2025-05-23 RX ADMIN — DIPHENHYDRAMINE HYDROCHLORIDE 50 MG: 50 INJECTION, SOLUTION INTRAMUSCULAR; INTRAVENOUS at 01:05

## 2025-05-23 RX ADMIN — HEPARIN SODIUM 4000 UNITS: 1000 INJECTION, SOLUTION INTRAVENOUS; SUBCUTANEOUS at 05:05

## 2025-05-23 RX ADMIN — MELATONIN TAB 3 MG 6 MG: 3 TAB at 01:05

## 2025-05-23 RX ADMIN — FLUTICASONE PROPIONATE 50 MCG: 50 SPRAY, METERED NASAL at 09:05

## 2025-05-23 RX ADMIN — WARFARIN SODIUM 2.5 MG: 2.5 TABLET ORAL at 07:05

## 2025-05-23 RX ADMIN — SODIUM CHLORIDE: 9 INJECTION, SOLUTION INTRAVENOUS at 09:05

## 2025-05-23 RX ADMIN — TIZANIDINE 4 MG: 2 TABLET ORAL at 09:05

## 2025-05-23 RX ADMIN — FOLIC ACID 1 MG: 1 TABLET ORAL at 08:05

## 2025-05-23 RX ADMIN — MUPIROCIN: 20 OINTMENT TOPICAL at 08:05

## 2025-05-23 RX ADMIN — METHOCARBAMOL 500 MG: 500 TABLET ORAL at 12:05

## 2025-05-23 RX ADMIN — AMLODIPINE BESYLATE 10 MG: 10 TABLET ORAL at 08:05

## 2025-05-23 RX ADMIN — TIZANIDINE 4 MG: 2 TABLET ORAL at 12:05

## 2025-05-23 RX ADMIN — ALTEPLASE 4 MG: 2.2 INJECTION, POWDER, LYOPHILIZED, FOR SOLUTION INTRAVENOUS at 03:05

## 2025-05-23 RX ADMIN — GABAPENTIN 800 MG: 400 CAPSULE ORAL at 03:05

## 2025-05-24 LAB
ABSOLUTE EOSINOPHIL (OHS): 0.26 K/UL
ABSOLUTE MONOCYTE (OHS): 0.66 K/UL (ref 0.3–1)
ABSOLUTE NEUTROPHIL COUNT (OHS): 1.9 K/UL (ref 1.8–7.7)
ALBUMIN SERPL BCP-MCNC: 2.8 G/DL (ref 3.5–5.2)
ALP SERPL-CCNC: 64 UNIT/L (ref 40–150)
ALT SERPL W/O P-5'-P-CCNC: 16 UNIT/L (ref 10–44)
ANION GAP (OHS): 6 MMOL/L (ref 8–16)
AST SERPL-CCNC: 18 UNIT/L (ref 11–45)
BASOPHILS # BLD AUTO: 0.03 K/UL
BASOPHILS NFR BLD AUTO: 0.5 %
BILIRUB SERPL-MCNC: 0.6 MG/DL (ref 0.1–1)
BUN SERPL-MCNC: 10 MG/DL (ref 6–20)
CALCIUM SERPL-MCNC: 8.1 MG/DL (ref 8.7–10.5)
CHLORIDE SERPL-SCNC: 109 MMOL/L (ref 95–110)
CO2 SERPL-SCNC: 24 MMOL/L (ref 23–29)
CREAT SERPL-MCNC: 1.2 MG/DL (ref 0.5–1.4)
ERYTHROCYTE [DISTWIDTH] IN BLOOD BY AUTOMATED COUNT: 22.4 % (ref 11.5–14.5)
GFR SERPLBLD CREATININE-BSD FMLA CKD-EPI: 56 ML/MIN/1.73/M2
GLUCOSE SERPL-MCNC: 95 MG/DL (ref 70–110)
HCT VFR BLD AUTO: 27.3 % (ref 37–48.5)
HGB BLD-MCNC: 9.3 GM/DL (ref 12–16)
IMM GRANULOCYTES # BLD AUTO: 0.01 K/UL (ref 0–0.04)
IMM GRANULOCYTES NFR BLD AUTO: 0.2 % (ref 0–0.5)
INR PPP: 1.9 (ref 0.8–1.2)
LYMPHOCYTES # BLD AUTO: 3.46 K/UL (ref 1–4.8)
MCH RBC QN AUTO: 27.2 PG (ref 27–31)
MCHC RBC AUTO-ENTMCNC: 34.1 G/DL (ref 32–36)
MCV RBC AUTO: 80 FL (ref 82–98)
NUCLEATED RBC (/100WBC) (OHS): 2 /100 WBC
PLATELET # BLD AUTO: 127 K/UL (ref 150–450)
PLATELET BLD QL SMEAR: ABNORMAL
PMV BLD AUTO: 9.5 FL (ref 9.2–12.9)
POTASSIUM SERPL-SCNC: 4.3 MMOL/L (ref 3.5–5.1)
PROT SERPL-MCNC: 5.8 GM/DL (ref 6–8.4)
PROTHROMBIN TIME: 20.5 SECONDS (ref 9–12.5)
RBC # BLD AUTO: 3.42 M/UL (ref 4–5.4)
RELATIVE EOSINOPHIL (OHS): 4.1 %
RELATIVE LYMPHOCYTE (OHS): 54.7 % (ref 18–48)
RELATIVE MONOCYTE (OHS): 10.4 % (ref 4–15)
RELATIVE NEUTROPHIL (OHS): 30.1 % (ref 38–73)
SODIUM SERPL-SCNC: 139 MMOL/L (ref 136–145)
WBC # BLD AUTO: 6.32 K/UL (ref 3.9–12.7)

## 2025-05-24 PROCEDURE — 25000003 PHARM REV CODE 250: Performed by: NURSE PRACTITIONER

## 2025-05-24 PROCEDURE — 84075 ASSAY ALKALINE PHOSPHATASE: CPT

## 2025-05-24 PROCEDURE — 83020 HEMOGLOBIN ELECTROPHORESIS: CPT | Performed by: STUDENT IN AN ORGANIZED HEALTH CARE EDUCATION/TRAINING PROGRAM

## 2025-05-24 PROCEDURE — 25000003 PHARM REV CODE 250

## 2025-05-24 PROCEDURE — 63600175 PHARM REV CODE 636 W HCPCS: Performed by: STUDENT IN AN ORGANIZED HEALTH CARE EDUCATION/TRAINING PROGRAM

## 2025-05-24 PROCEDURE — 25000003 PHARM REV CODE 250: Performed by: STUDENT IN AN ORGANIZED HEALTH CARE EDUCATION/TRAINING PROGRAM

## 2025-05-24 PROCEDURE — 94761 N-INVAS EAR/PLS OXIMETRY MLT: CPT

## 2025-05-24 PROCEDURE — 21400001 HC TELEMETRY ROOM

## 2025-05-24 PROCEDURE — 85610 PROTHROMBIN TIME: CPT

## 2025-05-24 PROCEDURE — 85025 COMPLETE CBC W/AUTO DIFF WBC: CPT

## 2025-05-24 RX ADMIN — HYDROMORPHONE HYDROCHLORIDE 3 MG: 1 INJECTION, SOLUTION INTRAMUSCULAR; INTRAVENOUS; SUBCUTANEOUS at 11:05

## 2025-05-24 RX ADMIN — HYDROMORPHONE HYDROCHLORIDE 3 MG: 1 INJECTION, SOLUTION INTRAMUSCULAR; INTRAVENOUS; SUBCUTANEOUS at 07:05

## 2025-05-24 RX ADMIN — HYDROMORPHONE HYDROCHLORIDE 3 MG: 1 INJECTION, SOLUTION INTRAMUSCULAR; INTRAVENOUS; SUBCUTANEOUS at 04:05

## 2025-05-24 RX ADMIN — TIZANIDINE 4 MG: 2 TABLET ORAL at 04:05

## 2025-05-24 RX ADMIN — DIPHENHYDRAMINE HYDROCHLORIDE 50 MG: 50 INJECTION, SOLUTION INTRAMUSCULAR; INTRAVENOUS at 10:05

## 2025-05-24 RX ADMIN — FLUTICASONE PROPIONATE 50 MCG: 50 SPRAY, METERED NASAL at 07:05

## 2025-05-24 RX ADMIN — SODIUM CHLORIDE: 9 INJECTION, SOLUTION INTRAVENOUS at 12:05

## 2025-05-24 RX ADMIN — FOLIC ACID 1 MG: 1 TABLET ORAL at 08:05

## 2025-05-24 RX ADMIN — SODIUM CHLORIDE: 9 INJECTION, SOLUTION INTRAVENOUS at 10:05

## 2025-05-24 RX ADMIN — ACETAMINOPHEN 1000 MG: 500 TABLET, FILM COATED ORAL at 09:05

## 2025-05-24 RX ADMIN — HYDROMORPHONE HYDROCHLORIDE 3 MG: 1 INJECTION, SOLUTION INTRAMUSCULAR; INTRAVENOUS; SUBCUTANEOUS at 10:05

## 2025-05-24 RX ADMIN — DIPHENHYDRAMINE HYDROCHLORIDE 50 MG: 50 INJECTION, SOLUTION INTRAMUSCULAR; INTRAVENOUS at 04:05

## 2025-05-24 RX ADMIN — MUPIROCIN: 20 OINTMENT TOPICAL at 08:05

## 2025-05-24 RX ADMIN — GABAPENTIN 800 MG: 400 CAPSULE ORAL at 08:05

## 2025-05-24 RX ADMIN — MORPHINE SULFATE 30 MG: 15 TABLET, FILM COATED, EXTENDED RELEASE ORAL at 08:05

## 2025-05-24 RX ADMIN — ACETAMINOPHEN 1000 MG: 500 TABLET, FILM COATED ORAL at 01:05

## 2025-05-24 RX ADMIN — GABAPENTIN 800 MG: 400 CAPSULE ORAL at 04:05

## 2025-05-24 RX ADMIN — WARFARIN SODIUM 2.5 MG: 2.5 TABLET ORAL at 04:05

## 2025-05-24 RX ADMIN — DIPHENHYDRAMINE HYDROCHLORIDE 50 MG: 50 INJECTION, SOLUTION INTRAMUSCULAR; INTRAVENOUS at 11:05

## 2025-05-24 RX ADMIN — AMLODIPINE BESYLATE 10 MG: 10 TABLET ORAL at 08:05

## 2025-05-24 RX ADMIN — SENNOSIDES AND DOCUSATE SODIUM 1 TABLET: 50; 8.6 TABLET ORAL at 08:05

## 2025-05-24 RX ADMIN — FLUOXETINE HYDROCHLORIDE 80 MG: 20 CAPSULE ORAL at 08:05

## 2025-05-24 RX ADMIN — HYDROMORPHONE HYDROCHLORIDE 3 MG: 1 INJECTION, SOLUTION INTRAMUSCULAR; INTRAVENOUS; SUBCUTANEOUS at 01:05

## 2025-05-24 RX ADMIN — OXYCODONE HYDROCHLORIDE AND ACETAMINOPHEN 500 MG: 500 TABLET ORAL at 08:05

## 2025-05-24 RX ADMIN — PANTOPRAZOLE SODIUM 40 MG: 40 TABLET, DELAYED RELEASE ORAL at 08:05

## 2025-05-24 RX ADMIN — METHOCARBAMOL 500 MG: 500 TABLET ORAL at 08:05

## 2025-05-24 RX ADMIN — TIZANIDINE 4 MG: 2 TABLET ORAL at 08:05

## 2025-05-24 RX ADMIN — PRAZOSIN HYDROCHLORIDE 1 MG: 1 CAPSULE ORAL at 08:05

## 2025-05-24 RX ADMIN — MORPHINE SULFATE 30 MG: 15 TABLET, FILM COATED, EXTENDED RELEASE ORAL at 09:05

## 2025-05-24 RX ADMIN — HYDROMORPHONE HYDROCHLORIDE 3 MG: 1 INJECTION, SOLUTION INTRAMUSCULAR; INTRAVENOUS; SUBCUTANEOUS at 12:05

## 2025-05-25 LAB
BACTERIA BLD CULT: NORMAL
BACTERIA BLD CULT: NORMAL

## 2025-05-25 PROCEDURE — 25000003 PHARM REV CODE 250: Performed by: STUDENT IN AN ORGANIZED HEALTH CARE EDUCATION/TRAINING PROGRAM

## 2025-05-25 PROCEDURE — 25000003 PHARM REV CODE 250: Performed by: NURSE PRACTITIONER

## 2025-05-25 PROCEDURE — 21400001 HC TELEMETRY ROOM

## 2025-05-25 PROCEDURE — 63600175 PHARM REV CODE 636 W HCPCS: Performed by: STUDENT IN AN ORGANIZED HEALTH CARE EDUCATION/TRAINING PROGRAM

## 2025-05-25 PROCEDURE — 25000003 PHARM REV CODE 250

## 2025-05-25 RX ORDER — DIPHENHYDRAMINE HYDROCHLORIDE 50 MG/ML
50 INJECTION, SOLUTION INTRAMUSCULAR; INTRAVENOUS ONCE
Status: COMPLETED | OUTPATIENT
Start: 2025-05-25 | End: 2025-05-25

## 2025-05-25 RX ADMIN — HYDROMORPHONE HYDROCHLORIDE 3 MG: 1 INJECTION, SOLUTION INTRAMUSCULAR; INTRAVENOUS; SUBCUTANEOUS at 05:05

## 2025-05-25 RX ADMIN — HYDROMORPHONE HYDROCHLORIDE 3 MG: 1 INJECTION, SOLUTION INTRAMUSCULAR; INTRAVENOUS; SUBCUTANEOUS at 02:05

## 2025-05-25 RX ADMIN — ACETAMINOPHEN 1000 MG: 500 TABLET, FILM COATED ORAL at 02:05

## 2025-05-25 RX ADMIN — MUPIROCIN: 20 OINTMENT TOPICAL at 10:05

## 2025-05-25 RX ADMIN — METHOCARBAMOL 500 MG: 500 TABLET ORAL at 05:05

## 2025-05-25 RX ADMIN — HYDROMORPHONE HYDROCHLORIDE 3 MG: 1 INJECTION, SOLUTION INTRAMUSCULAR; INTRAVENOUS; SUBCUTANEOUS at 08:05

## 2025-05-25 RX ADMIN — SENNOSIDES AND DOCUSATE SODIUM 1 TABLET: 50; 8.6 TABLET ORAL at 10:05

## 2025-05-25 RX ADMIN — GABAPENTIN 800 MG: 400 CAPSULE ORAL at 10:05

## 2025-05-25 RX ADMIN — HYDROMORPHONE HYDROCHLORIDE 3 MG: 1 INJECTION, SOLUTION INTRAMUSCULAR; INTRAVENOUS; SUBCUTANEOUS at 09:05

## 2025-05-25 RX ADMIN — MORPHINE SULFATE 30 MG: 15 TABLET, FILM COATED, EXTENDED RELEASE ORAL at 10:05

## 2025-05-25 RX ADMIN — PRAZOSIN HYDROCHLORIDE 1 MG: 1 CAPSULE ORAL at 09:05

## 2025-05-25 RX ADMIN — GABAPENTIN 800 MG: 400 CAPSULE ORAL at 02:05

## 2025-05-25 RX ADMIN — METHOCARBAMOL 500 MG: 500 TABLET ORAL at 02:05

## 2025-05-25 RX ADMIN — GABAPENTIN 800 MG: 400 CAPSULE ORAL at 09:05

## 2025-05-25 RX ADMIN — METHOCARBAMOL 500 MG: 500 TABLET ORAL at 09:05

## 2025-05-25 RX ADMIN — TIZANIDINE 4 MG: 2 TABLET ORAL at 12:05

## 2025-05-25 RX ADMIN — OXYCODONE HYDROCHLORIDE AND ACETAMINOPHEN 500 MG: 500 TABLET ORAL at 10:05

## 2025-05-25 RX ADMIN — DIPHENHYDRAMINE HYDROCHLORIDE 50 MG: 50 INJECTION, SOLUTION INTRAMUSCULAR; INTRAVENOUS at 02:05

## 2025-05-25 RX ADMIN — DIPHENHYDRAMINE HYDROCHLORIDE 50 MG: 50 INJECTION, SOLUTION INTRAMUSCULAR; INTRAVENOUS at 05:05

## 2025-05-25 RX ADMIN — ACETAMINOPHEN 1000 MG: 500 TABLET, FILM COATED ORAL at 05:05

## 2025-05-25 RX ADMIN — FOLIC ACID 1 MG: 1 TABLET ORAL at 10:05

## 2025-05-25 RX ADMIN — TIZANIDINE 4 MG: 2 TABLET ORAL at 10:05

## 2025-05-25 RX ADMIN — PANTOPRAZOLE SODIUM 40 MG: 40 TABLET, DELAYED RELEASE ORAL at 10:05

## 2025-05-25 RX ADMIN — SENNOSIDES AND DOCUSATE SODIUM 1 TABLET: 50; 8.6 TABLET ORAL at 09:05

## 2025-05-25 RX ADMIN — HYDROMORPHONE HYDROCHLORIDE 3 MG: 1 INJECTION, SOLUTION INTRAMUSCULAR; INTRAVENOUS; SUBCUTANEOUS at 11:05

## 2025-05-25 RX ADMIN — DIPHENHYDRAMINE HYDROCHLORIDE 50 MG: 50 INJECTION, SOLUTION INTRAMUSCULAR; INTRAVENOUS at 09:05

## 2025-05-25 RX ADMIN — MUPIROCIN: 20 OINTMENT TOPICAL at 09:05

## 2025-05-25 RX ADMIN — AMLODIPINE BESYLATE 10 MG: 10 TABLET ORAL at 10:05

## 2025-05-25 RX ADMIN — FLUOXETINE HYDROCHLORIDE 80 MG: 20 CAPSULE ORAL at 10:05

## 2025-05-25 RX ADMIN — FLUTICASONE PROPIONATE 50 MCG: 50 SPRAY, METERED NASAL at 10:05

## 2025-05-25 RX ADMIN — WARFARIN SODIUM 2.5 MG: 2.5 TABLET ORAL at 05:05

## 2025-05-25 RX ADMIN — MORPHINE SULFATE 30 MG: 15 TABLET, FILM COATED, EXTENDED RELEASE ORAL at 09:05

## 2025-05-25 RX ADMIN — SODIUM CHLORIDE: 9 INJECTION, SOLUTION INTRAVENOUS at 09:05

## 2025-05-25 RX ADMIN — TIZANIDINE 4 MG: 2 TABLET ORAL at 07:05

## 2025-05-26 DIAGNOSIS — D57.00 HB-SS DISEASE WITH VASO-OCCLUSIVE PAIN: ICD-10-CM

## 2025-05-26 DIAGNOSIS — D57.00 SICKLE CELL PAIN CRISIS: ICD-10-CM

## 2025-05-26 LAB
ABSOLUTE EOSINOPHIL (OHS): 0.37 K/UL
ABSOLUTE MONOCYTE (OHS): 0.98 K/UL (ref 0.3–1)
ABSOLUTE NEUTROPHIL COUNT (OHS): 4 K/UL (ref 1.8–7.7)
ALBUMIN SERPL BCP-MCNC: 3.3 G/DL (ref 3.5–5.2)
ALP SERPL-CCNC: 72 UNIT/L (ref 40–150)
ALT SERPL W/O P-5'-P-CCNC: 11 UNIT/L (ref 10–44)
ANION GAP (OHS): 8 MMOL/L (ref 8–16)
ANISOCYTOSIS BLD QL SMEAR: SLIGHT
AST SERPL-CCNC: 20 UNIT/L (ref 11–45)
BASOPHILS # BLD AUTO: 0.05 K/UL
BASOPHILS NFR BLD AUTO: 0.5 %
BILIRUB SERPL-MCNC: 0.4 MG/DL (ref 0.1–1)
BUN SERPL-MCNC: 13 MG/DL (ref 6–20)
CALCIUM SERPL-MCNC: 8.8 MG/DL (ref 8.7–10.5)
CHLORIDE SERPL-SCNC: 105 MMOL/L (ref 95–110)
CO2 SERPL-SCNC: 28 MMOL/L (ref 23–29)
CREAT SERPL-MCNC: 1 MG/DL (ref 0.5–1.4)
DACRYOCYTES BLD QL SMEAR: NORMAL
ERYTHROCYTE [DISTWIDTH] IN BLOOD BY AUTOMATED COUNT: 24 % (ref 11.5–14.5)
GFR SERPLBLD CREATININE-BSD FMLA CKD-EPI: >60 ML/MIN/1.73/M2
GLUCOSE SERPL-MCNC: 68 MG/DL (ref 70–110)
HCT VFR BLD AUTO: 28.6 % (ref 37–48.5)
HGB BLD-MCNC: 9.6 GM/DL (ref 12–16)
HGB S MFR BLD ELPH: 20 %
HYPOCHROMIA BLD QL SMEAR: NORMAL
IMM GRANULOCYTES # BLD AUTO: 0.02 K/UL (ref 0–0.04)
IMM GRANULOCYTES NFR BLD AUTO: 0.2 % (ref 0–0.5)
INR PPP: 1.4 (ref 0.8–1.2)
LYMPHOCYTES # BLD AUTO: 3.84 K/UL (ref 1–4.8)
MCH RBC QN AUTO: 27 PG (ref 27–31)
MCHC RBC AUTO-ENTMCNC: 33.6 G/DL (ref 32–36)
MCV RBC AUTO: 81 FL (ref 82–98)
NUCLEATED RBC (/100WBC) (OHS): 3 /100 WBC
OVALOCYTES BLD QL SMEAR: NORMAL
PATHOLOGIST INTERPRETATION - HGB SERUM (OHS): NORMAL
PLATELET # BLD AUTO: 194 K/UL (ref 150–450)
PLATELET BLD QL SMEAR: NORMAL
PLATELET BLD QL SMEAR: NORMAL
PMV BLD AUTO: 9.1 FL (ref 9.2–12.9)
POIKILOCYTOSIS BLD QL SMEAR: NORMAL
POLYCHROMASIA BLD QL SMEAR: NORMAL
POTASSIUM SERPL-SCNC: 3.8 MMOL/L (ref 3.5–5.1)
PROT SERPL-MCNC: 6.7 GM/DL (ref 6–8.4)
PROTHROMBIN TIME: 15.2 SECONDS (ref 9–12.5)
RBC # BLD AUTO: 3.55 M/UL (ref 4–5.4)
RELATIVE EOSINOPHIL (OHS): 4 %
RELATIVE LYMPHOCYTE (OHS): 41.5 % (ref 18–48)
RELATIVE MONOCYTE (OHS): 10.6 % (ref 4–15)
RELATIVE NEUTROPHIL (OHS): 43.2 % (ref 38–73)
SCHISTOCYTES BLD QL SMEAR: NORMAL
SCHISTOCYTES BLD QL SMEAR: NORMAL
SICKLE CELLS BLD QL SMEAR: NORMAL
SODIUM SERPL-SCNC: 141 MMOL/L (ref 136–145)
TARGETS BLD QL SMEAR: NORMAL
WBC # BLD AUTO: 9.26 K/UL (ref 3.9–12.7)

## 2025-05-26 PROCEDURE — 21400001 HC TELEMETRY ROOM

## 2025-05-26 PROCEDURE — 36415 COLL VENOUS BLD VENIPUNCTURE: CPT | Performed by: STUDENT IN AN ORGANIZED HEALTH CARE EDUCATION/TRAINING PROGRAM

## 2025-05-26 PROCEDURE — 25000003 PHARM REV CODE 250: Performed by: NURSE PRACTITIONER

## 2025-05-26 PROCEDURE — 85025 COMPLETE CBC W/AUTO DIFF WBC: CPT | Performed by: STUDENT IN AN ORGANIZED HEALTH CARE EDUCATION/TRAINING PROGRAM

## 2025-05-26 PROCEDURE — 80053 COMPREHEN METABOLIC PANEL: CPT | Performed by: STUDENT IN AN ORGANIZED HEALTH CARE EDUCATION/TRAINING PROGRAM

## 2025-05-26 PROCEDURE — C1751 CATH, INF, PER/CENT/MIDLINE: HCPCS

## 2025-05-26 PROCEDURE — 25000003 PHARM REV CODE 250

## 2025-05-26 PROCEDURE — 25000003 PHARM REV CODE 250: Performed by: STUDENT IN AN ORGANIZED HEALTH CARE EDUCATION/TRAINING PROGRAM

## 2025-05-26 PROCEDURE — 85610 PROTHROMBIN TIME: CPT | Performed by: STUDENT IN AN ORGANIZED HEALTH CARE EDUCATION/TRAINING PROGRAM

## 2025-05-26 PROCEDURE — 63600175 PHARM REV CODE 636 W HCPCS: Performed by: STUDENT IN AN ORGANIZED HEALTH CARE EDUCATION/TRAINING PROGRAM

## 2025-05-26 RX ORDER — WARFARIN 2 MG/1
4 TABLET ORAL ONCE
Status: COMPLETED | OUTPATIENT
Start: 2025-05-26 | End: 2025-05-26

## 2025-05-26 RX ORDER — WARFARIN 2.5 MG/1
2.5 TABLET ORAL DAILY
Status: DISCONTINUED | OUTPATIENT
Start: 2025-05-27 | End: 2025-05-27

## 2025-05-26 RX ORDER — MORPHINE SULFATE 30 MG/1
30 TABLET, FILM COATED, EXTENDED RELEASE ORAL EVERY 12 HOURS
Qty: 60 TABLET | Refills: 0 | Status: SHIPPED | OUTPATIENT
Start: 2025-05-26 | End: 2025-06-28

## 2025-05-26 RX ORDER — TIZANIDINE 4 MG/1
4 TABLET ORAL EVERY 8 HOURS PRN
Qty: 21 TABLET | Refills: 0 | Status: SHIPPED | OUTPATIENT
Start: 2025-05-26 | End: 2025-06-03

## 2025-05-26 RX ORDER — WARFARIN 2.5 MG/1
2.5 TABLET ORAL DAILY
Qty: 30 TABLET | Refills: 0 | Status: SHIPPED | OUTPATIENT
Start: 2025-05-26 | End: 2025-06-26

## 2025-05-26 RX ORDER — HYDROCODONE BITARTRATE AND ACETAMINOPHEN 10; 325 MG/1; MG/1
1 TABLET ORAL EVERY 6 HOURS PRN
Qty: 120 TABLET | Refills: 0 | Status: SHIPPED | OUTPATIENT
Start: 2025-05-26 | End: 2025-06-26

## 2025-05-26 RX ADMIN — SENNOSIDES AND DOCUSATE SODIUM 1 TABLET: 50; 8.6 TABLET ORAL at 09:05

## 2025-05-26 RX ADMIN — DIPHENHYDRAMINE HYDROCHLORIDE 50 MG: 50 INJECTION, SOLUTION INTRAMUSCULAR; INTRAVENOUS at 03:05

## 2025-05-26 RX ADMIN — ACETAMINOPHEN 1000 MG: 500 TABLET, FILM COATED ORAL at 05:05

## 2025-05-26 RX ADMIN — GABAPENTIN 800 MG: 400 CAPSULE ORAL at 09:05

## 2025-05-26 RX ADMIN — HYDROMORPHONE HYDROCHLORIDE 3 MG: 1 INJECTION, SOLUTION INTRAMUSCULAR; INTRAVENOUS; SUBCUTANEOUS at 01:05

## 2025-05-26 RX ADMIN — HYDROMORPHONE HYDROCHLORIDE 3 MG: 1 INJECTION, SOLUTION INTRAMUSCULAR; INTRAVENOUS; SUBCUTANEOUS at 04:05

## 2025-05-26 RX ADMIN — HYDROMORPHONE HYDROCHLORIDE 3 MG: 1 INJECTION, SOLUTION INTRAMUSCULAR; INTRAVENOUS; SUBCUTANEOUS at 07:05

## 2025-05-26 RX ADMIN — METHOCARBAMOL 500 MG: 500 TABLET ORAL at 04:05

## 2025-05-26 RX ADMIN — DIPHENHYDRAMINE HYDROCHLORIDE 50 MG: 50 INJECTION, SOLUTION INTRAMUSCULAR; INTRAVENOUS at 10:05

## 2025-05-26 RX ADMIN — GABAPENTIN 800 MG: 400 CAPSULE ORAL at 08:05

## 2025-05-26 RX ADMIN — AMLODIPINE BESYLATE 10 MG: 10 TABLET ORAL at 08:05

## 2025-05-26 RX ADMIN — DIPHENHYDRAMINE HYDROCHLORIDE 50 MG: 50 INJECTION, SOLUTION INTRAMUSCULAR; INTRAVENOUS at 04:05

## 2025-05-26 RX ADMIN — PANTOPRAZOLE SODIUM 40 MG: 40 TABLET, DELAYED RELEASE ORAL at 08:05

## 2025-05-26 RX ADMIN — ACETAMINOPHEN 1000 MG: 500 TABLET, FILM COATED ORAL at 09:05

## 2025-05-26 RX ADMIN — MORPHINE SULFATE 30 MG: 15 TABLET, FILM COATED, EXTENDED RELEASE ORAL at 09:05

## 2025-05-26 RX ADMIN — MUPIROCIN: 20 OINTMENT TOPICAL at 09:05

## 2025-05-26 RX ADMIN — HYDROMORPHONE HYDROCHLORIDE 3 MG: 1 INJECTION, SOLUTION INTRAMUSCULAR; INTRAVENOUS; SUBCUTANEOUS at 10:05

## 2025-05-26 RX ADMIN — FLUTICASONE PROPIONATE 50 MCG: 50 SPRAY, METERED NASAL at 09:05

## 2025-05-26 RX ADMIN — TIZANIDINE 4 MG: 2 TABLET ORAL at 03:05

## 2025-05-26 RX ADMIN — WARFARIN SODIUM 4 MG: 2 TABLET ORAL at 05:05

## 2025-05-26 RX ADMIN — TIZANIDINE 4 MG: 2 TABLET ORAL at 09:05

## 2025-05-26 RX ADMIN — FOLIC ACID 1 MG: 1 TABLET ORAL at 08:05

## 2025-05-26 RX ADMIN — OXYCODONE HYDROCHLORIDE AND ACETAMINOPHEN 500 MG: 500 TABLET ORAL at 09:05

## 2025-05-26 RX ADMIN — HYDROMORPHONE HYDROCHLORIDE 3 MG: 1 INJECTION, SOLUTION INTRAMUSCULAR; INTRAVENOUS; SUBCUTANEOUS at 12:05

## 2025-05-26 RX ADMIN — METHOCARBAMOL 500 MG: 500 TABLET ORAL at 08:05

## 2025-05-26 RX ADMIN — GABAPENTIN 800 MG: 400 CAPSULE ORAL at 02:05

## 2025-05-26 RX ADMIN — HYDROMORPHONE HYDROCHLORIDE 3 MG: 1 INJECTION, SOLUTION INTRAMUSCULAR; INTRAVENOUS; SUBCUTANEOUS at 03:05

## 2025-05-26 RX ADMIN — MELATONIN TAB 3 MG 6 MG: 3 TAB at 12:05

## 2025-05-26 RX ADMIN — METHOCARBAMOL 500 MG: 500 TABLET ORAL at 09:05

## 2025-05-26 RX ADMIN — FLUOXETINE HYDROCHLORIDE 80 MG: 20 CAPSULE ORAL at 08:05

## 2025-05-26 RX ADMIN — SENNOSIDES AND DOCUSATE SODIUM 1 TABLET: 50; 8.6 TABLET ORAL at 08:05

## 2025-05-26 RX ADMIN — MORPHINE SULFATE 30 MG: 15 TABLET, FILM COATED, EXTENDED RELEASE ORAL at 08:05

## 2025-05-26 RX ADMIN — ACETAMINOPHEN 1000 MG: 500 TABLET, FILM COATED ORAL at 02:05

## 2025-05-26 RX ADMIN — PRAZOSIN HYDROCHLORIDE 1 MG: 1 CAPSULE ORAL at 09:05

## 2025-05-26 RX ADMIN — TIZANIDINE 4 MG: 2 TABLET ORAL at 01:05

## 2025-05-27 LAB
ABSOLUTE EOSINOPHIL (OHS): 0.4 K/UL
ABSOLUTE MONOCYTE (OHS): 0.71 K/UL (ref 0.3–1)
ABSOLUTE NEUTROPHIL COUNT (OHS): 3.17 K/UL (ref 1.8–7.7)
ALBUMIN SERPL BCP-MCNC: 3.1 G/DL (ref 3.5–5.2)
ALP SERPL-CCNC: 66 UNIT/L (ref 40–150)
ALT SERPL W/O P-5'-P-CCNC: 11 UNIT/L (ref 10–44)
ANION GAP (OHS): 7 MMOL/L (ref 8–16)
AST SERPL-CCNC: 18 UNIT/L (ref 11–45)
BASOPHILS # BLD AUTO: 0.05 K/UL
BASOPHILS NFR BLD AUTO: 0.7 %
BILIRUB SERPL-MCNC: 0.4 MG/DL (ref 0.1–1)
BUN SERPL-MCNC: 14 MG/DL (ref 6–20)
CALCIUM SERPL-MCNC: 8.7 MG/DL (ref 8.7–10.5)
CHLORIDE SERPL-SCNC: 107 MMOL/L (ref 95–110)
CO2 SERPL-SCNC: 27 MMOL/L (ref 23–29)
CREAT SERPL-MCNC: 1 MG/DL (ref 0.5–1.4)
ERYTHROCYTE [DISTWIDTH] IN BLOOD BY AUTOMATED COUNT: 24.4 % (ref 11.5–14.5)
GFR SERPLBLD CREATININE-BSD FMLA CKD-EPI: >60 ML/MIN/1.73/M2
GLUCOSE SERPL-MCNC: 98 MG/DL (ref 70–110)
HCT VFR BLD AUTO: 28.2 % (ref 37–48.5)
HGB BLD-MCNC: 9.3 GM/DL (ref 12–16)
IMM GRANULOCYTES # BLD AUTO: 0.02 K/UL (ref 0–0.04)
IMM GRANULOCYTES NFR BLD AUTO: 0.3 % (ref 0–0.5)
INR PPP: 1.3 (ref 0.8–1.2)
LYMPHOCYTES # BLD AUTO: 3.33 K/UL (ref 1–4.8)
MCH RBC QN AUTO: 27.1 PG (ref 27–31)
MCHC RBC AUTO-ENTMCNC: 33 G/DL (ref 32–36)
MCV RBC AUTO: 82 FL (ref 82–98)
NUCLEATED RBC (/100WBC) (OHS): 3 /100 WBC
PLATELET # BLD AUTO: 190 K/UL (ref 150–450)
PMV BLD AUTO: 9 FL (ref 9.2–12.9)
POTASSIUM SERPL-SCNC: 4.1 MMOL/L (ref 3.5–5.1)
PROT SERPL-MCNC: 6.6 GM/DL (ref 6–8.4)
PROTHROMBIN TIME: 14.3 SECONDS (ref 9–12.5)
RBC # BLD AUTO: 3.43 M/UL (ref 4–5.4)
RELATIVE EOSINOPHIL (OHS): 5.2 %
RELATIVE LYMPHOCYTE (OHS): 43.4 % (ref 18–48)
RELATIVE MONOCYTE (OHS): 9.2 % (ref 4–15)
RELATIVE NEUTROPHIL (OHS): 41.2 % (ref 38–73)
SODIUM SERPL-SCNC: 141 MMOL/L (ref 136–145)
WBC # BLD AUTO: 7.68 K/UL (ref 3.9–12.7)

## 2025-05-27 PROCEDURE — 85610 PROTHROMBIN TIME: CPT

## 2025-05-27 PROCEDURE — 36415 COLL VENOUS BLD VENIPUNCTURE: CPT

## 2025-05-27 PROCEDURE — 25000003 PHARM REV CODE 250

## 2025-05-27 PROCEDURE — 63600175 PHARM REV CODE 636 W HCPCS: Performed by: STUDENT IN AN ORGANIZED HEALTH CARE EDUCATION/TRAINING PROGRAM

## 2025-05-27 PROCEDURE — 82947 ASSAY GLUCOSE BLOOD QUANT: CPT

## 2025-05-27 PROCEDURE — 94761 N-INVAS EAR/PLS OXIMETRY MLT: CPT

## 2025-05-27 PROCEDURE — 85025 COMPLETE CBC W/AUTO DIFF WBC: CPT

## 2025-05-27 PROCEDURE — 25000003 PHARM REV CODE 250: Performed by: NURSE PRACTITIONER

## 2025-05-27 PROCEDURE — 63600175 PHARM REV CODE 636 W HCPCS: Performed by: HOSPITALIST

## 2025-05-27 PROCEDURE — 21400001 HC TELEMETRY ROOM

## 2025-05-27 PROCEDURE — 25000003 PHARM REV CODE 250: Performed by: HOSPITALIST

## 2025-05-27 RX ORDER — SODIUM CHLORIDE, SODIUM LACTATE, POTASSIUM CHLORIDE, CALCIUM CHLORIDE 600; 310; 30; 20 MG/100ML; MG/100ML; MG/100ML; MG/100ML
INJECTION, SOLUTION INTRAVENOUS CONTINUOUS
Status: DISCONTINUED | OUTPATIENT
Start: 2025-05-27 | End: 2025-05-30 | Stop reason: HOSPADM

## 2025-05-27 RX ADMIN — HYDROMORPHONE HYDROCHLORIDE 3 MG: 1 INJECTION, SOLUTION INTRAMUSCULAR; INTRAVENOUS; SUBCUTANEOUS at 01:05

## 2025-05-27 RX ADMIN — HYDROMORPHONE HYDROCHLORIDE 3 MG: 1 INJECTION, SOLUTION INTRAMUSCULAR; INTRAVENOUS; SUBCUTANEOUS at 04:05

## 2025-05-27 RX ADMIN — HYDROMORPHONE HYDROCHLORIDE 3 MG: 1 INJECTION, SOLUTION INTRAMUSCULAR; INTRAVENOUS; SUBCUTANEOUS at 11:05

## 2025-05-27 RX ADMIN — OXYCODONE HYDROCHLORIDE AND ACETAMINOPHEN 500 MG: 500 TABLET ORAL at 08:05

## 2025-05-27 RX ADMIN — FOLIC ACID 1 MG: 1 TABLET ORAL at 08:05

## 2025-05-27 RX ADMIN — METHOCARBAMOL 500 MG: 500 TABLET ORAL at 08:05

## 2025-05-27 RX ADMIN — DIPHENHYDRAMINE HYDROCHLORIDE 50 MG: 50 INJECTION, SOLUTION INTRAMUSCULAR; INTRAVENOUS at 08:05

## 2025-05-27 RX ADMIN — SENNOSIDES AND DOCUSATE SODIUM 1 TABLET: 50; 8.6 TABLET ORAL at 08:05

## 2025-05-27 RX ADMIN — WARFARIN SODIUM 3 MG: 2 TABLET ORAL at 05:05

## 2025-05-27 RX ADMIN — HYDROMORPHONE HYDROCHLORIDE 3 MG: 1 INJECTION, SOLUTION INTRAMUSCULAR; INTRAVENOUS; SUBCUTANEOUS at 08:05

## 2025-05-27 RX ADMIN — GABAPENTIN 800 MG: 400 CAPSULE ORAL at 08:05

## 2025-05-27 RX ADMIN — PANTOPRAZOLE SODIUM 40 MG: 40 TABLET, DELAYED RELEASE ORAL at 08:05

## 2025-05-27 RX ADMIN — PRAZOSIN HYDROCHLORIDE 1 MG: 1 CAPSULE ORAL at 08:05

## 2025-05-27 RX ADMIN — MORPHINE SULFATE 30 MG: 15 TABLET, FILM COATED, EXTENDED RELEASE ORAL at 08:05

## 2025-05-27 RX ADMIN — ACETAMINOPHEN 1000 MG: 500 TABLET, FILM COATED ORAL at 01:05

## 2025-05-27 RX ADMIN — FLUOXETINE HYDROCHLORIDE 80 MG: 20 CAPSULE ORAL at 08:05

## 2025-05-27 RX ADMIN — DIPHENHYDRAMINE HYDROCHLORIDE 50 MG: 50 INJECTION, SOLUTION INTRAMUSCULAR; INTRAVENOUS at 04:05

## 2025-05-27 RX ADMIN — DIPHENHYDRAMINE HYDROCHLORIDE 50 MG: 50 INJECTION, SOLUTION INTRAMUSCULAR; INTRAVENOUS at 05:05

## 2025-05-27 RX ADMIN — TIZANIDINE 4 MG: 2 TABLET ORAL at 12:05

## 2025-05-27 RX ADMIN — METHOCARBAMOL 500 MG: 500 TABLET ORAL at 05:05

## 2025-05-27 RX ADMIN — TIZANIDINE 4 MG: 2 TABLET ORAL at 08:05

## 2025-05-27 RX ADMIN — METHOCARBAMOL 500 MG: 500 TABLET ORAL at 01:05

## 2025-05-27 RX ADMIN — DIPHENHYDRAMINE HYDROCHLORIDE 50 MG: 50 INJECTION, SOLUTION INTRAMUSCULAR; INTRAVENOUS at 10:05

## 2025-05-27 RX ADMIN — HYDROMORPHONE HYDROCHLORIDE 3 MG: 1 INJECTION, SOLUTION INTRAMUSCULAR; INTRAVENOUS; SUBCUTANEOUS at 05:05

## 2025-05-27 RX ADMIN — FLUTICASONE PROPIONATE 50 MCG: 50 SPRAY, METERED NASAL at 08:05

## 2025-05-27 RX ADMIN — HYDROMORPHONE HYDROCHLORIDE 3 MG: 1 INJECTION, SOLUTION INTRAMUSCULAR; INTRAVENOUS; SUBCUTANEOUS at 10:05

## 2025-05-27 RX ADMIN — GABAPENTIN 800 MG: 400 CAPSULE ORAL at 02:05

## 2025-05-27 RX ADMIN — DIPHENHYDRAMINE HYDROCHLORIDE 50 MG: 50 INJECTION, SOLUTION INTRAMUSCULAR; INTRAVENOUS at 11:05

## 2025-05-27 RX ADMIN — SODIUM CHLORIDE, POTASSIUM CHLORIDE, SODIUM LACTATE AND CALCIUM CHLORIDE: 600; 310; 30; 20 INJECTION, SOLUTION INTRAVENOUS at 08:05

## 2025-05-27 RX ADMIN — AMLODIPINE BESYLATE 10 MG: 10 TABLET ORAL at 08:05

## 2025-05-27 RX ADMIN — TIZANIDINE 4 MG: 2 TABLET ORAL at 04:05

## 2025-05-28 LAB
ABSOLUTE NEUTROPHIL MANUAL (OHS): 0 K/UL
ALBUMIN SERPL BCP-MCNC: 3.2 G/DL (ref 3.5–5.2)
ALP SERPL-CCNC: 76 UNIT/L (ref 40–150)
ALT SERPL W/O P-5'-P-CCNC: 13 UNIT/L (ref 10–44)
ANION GAP (OHS): 8 MMOL/L (ref 8–16)
ANISOCYTOSIS BLD QL SMEAR: SLIGHT
AST SERPL-CCNC: 21 UNIT/L (ref 11–45)
BILIRUB SERPL-MCNC: 0.3 MG/DL (ref 0.1–1)
BUN SERPL-MCNC: 12 MG/DL (ref 6–20)
CALCIUM SERPL-MCNC: 8.8 MG/DL (ref 8.7–10.5)
CHLORIDE SERPL-SCNC: 105 MMOL/L (ref 95–110)
CO2 SERPL-SCNC: 27 MMOL/L (ref 23–29)
CREAT SERPL-MCNC: 1 MG/DL (ref 0.5–1.4)
ERYTHROCYTE [DISTWIDTH] IN BLOOD BY AUTOMATED COUNT: 24.1 % (ref 11.5–14.5)
GFR SERPLBLD CREATININE-BSD FMLA CKD-EPI: >60 ML/MIN/1.73/M2
GLUCOSE SERPL-MCNC: 133 MG/DL (ref 70–110)
HCT VFR BLD AUTO: 29.1 % (ref 37–48.5)
HGB BLD-MCNC: 9.4 GM/DL (ref 12–16)
HYPOCHROMIA BLD QL SMEAR: NORMAL
INR PPP: 1.4 (ref 0.8–1.2)
MCH RBC QN AUTO: 26.5 PG (ref 27–31)
MCHC RBC AUTO-ENTMCNC: 32.3 G/DL (ref 32–36)
MCV RBC AUTO: 82 FL (ref 82–98)
NUCLEATED RBC (/100WBC) (OHS): 2 /100 WBC
PLATELET # BLD AUTO: 243 K/UL (ref 150–450)
PLATELET BLD QL SMEAR: NORMAL
PMV BLD AUTO: 9.4 FL (ref 9.2–12.9)
POIKILOCYTOSIS BLD QL SMEAR: SLIGHT
POLYCHROMASIA BLD QL SMEAR: NORMAL
POTASSIUM SERPL-SCNC: 4 MMOL/L (ref 3.5–5.1)
PROT SERPL-MCNC: 6.7 GM/DL (ref 6–8.4)
PROTHROMBIN TIME: 15.5 SECONDS (ref 9–12.5)
RBC # BLD AUTO: 3.55 M/UL (ref 4–5.4)
SCHISTOCYTES BLD QL SMEAR: NORMAL
SICKLE CELLS BLD QL SMEAR: NORMAL
SODIUM SERPL-SCNC: 140 MMOL/L (ref 136–145)
TARGETS BLD QL SMEAR: NORMAL
WBC # BLD AUTO: 7.01 K/UL (ref 3.9–12.7)

## 2025-05-28 PROCEDURE — 63600175 PHARM REV CODE 636 W HCPCS: Performed by: STUDENT IN AN ORGANIZED HEALTH CARE EDUCATION/TRAINING PROGRAM

## 2025-05-28 PROCEDURE — 94761 N-INVAS EAR/PLS OXIMETRY MLT: CPT

## 2025-05-28 PROCEDURE — 85027 COMPLETE CBC AUTOMATED: CPT

## 2025-05-28 PROCEDURE — 11000001 HC ACUTE MED/SURG PRIVATE ROOM

## 2025-05-28 PROCEDURE — 25000003 PHARM REV CODE 250: Performed by: NURSE PRACTITIONER

## 2025-05-28 PROCEDURE — 85610 PROTHROMBIN TIME: CPT

## 2025-05-28 PROCEDURE — 63600175 PHARM REV CODE 636 W HCPCS: Performed by: HOSPITALIST

## 2025-05-28 PROCEDURE — 25000003 PHARM REV CODE 250

## 2025-05-28 PROCEDURE — 80053 COMPREHEN METABOLIC PANEL: CPT

## 2025-05-28 PROCEDURE — 36415 COLL VENOUS BLD VENIPUNCTURE: CPT

## 2025-05-28 PROCEDURE — 25000003 PHARM REV CODE 250: Performed by: HOSPITALIST

## 2025-05-28 RX ORDER — DIPHENHYDRAMINE HYDROCHLORIDE 50 MG/ML
50 INJECTION, SOLUTION INTRAMUSCULAR; INTRAVENOUS
Status: DISCONTINUED | OUTPATIENT
Start: 2025-05-28 | End: 2025-05-30 | Stop reason: HOSPADM

## 2025-05-28 RX ADMIN — METHOCARBAMOL 500 MG: 500 TABLET ORAL at 11:05

## 2025-05-28 RX ADMIN — DIPHENHYDRAMINE HYDROCHLORIDE 50 MG: 50 INJECTION, SOLUTION INTRAMUSCULAR; INTRAVENOUS at 03:05

## 2025-05-28 RX ADMIN — METHOCARBAMOL 500 MG: 500 TABLET ORAL at 08:05

## 2025-05-28 RX ADMIN — GABAPENTIN 800 MG: 400 CAPSULE ORAL at 08:05

## 2025-05-28 RX ADMIN — TIZANIDINE 4 MG: 2 TABLET ORAL at 05:05

## 2025-05-28 RX ADMIN — DIPHENHYDRAMINE HYDROCHLORIDE 50 MG: 50 INJECTION, SOLUTION INTRAMUSCULAR; INTRAVENOUS at 09:05

## 2025-05-28 RX ADMIN — MORPHINE SULFATE 30 MG: 15 TABLET, FILM COATED, EXTENDED RELEASE ORAL at 08:05

## 2025-05-28 RX ADMIN — HYDROMORPHONE HYDROCHLORIDE 3 MG: 1 INJECTION, SOLUTION INTRAMUSCULAR; INTRAVENOUS; SUBCUTANEOUS at 11:05

## 2025-05-28 RX ADMIN — HYDROMORPHONE HYDROCHLORIDE 3 MG: 1 INJECTION, SOLUTION INTRAMUSCULAR; INTRAVENOUS; SUBCUTANEOUS at 08:05

## 2025-05-28 RX ADMIN — PRAZOSIN HYDROCHLORIDE 1 MG: 1 CAPSULE ORAL at 09:05

## 2025-05-28 RX ADMIN — HYDROMORPHONE HYDROCHLORIDE 3 MG: 1 INJECTION, SOLUTION INTRAMUSCULAR; INTRAVENOUS; SUBCUTANEOUS at 09:05

## 2025-05-28 RX ADMIN — DIPHENHYDRAMINE HYDROCHLORIDE 50 MG: 50 INJECTION, SOLUTION INTRAMUSCULAR; INTRAVENOUS at 05:05

## 2025-05-28 RX ADMIN — TIZANIDINE 4 MG: 2 TABLET ORAL at 03:05

## 2025-05-28 RX ADMIN — DIPHENHYDRAMINE HYDROCHLORIDE 50 MG: 50 INJECTION, SOLUTION INTRAMUSCULAR; INTRAVENOUS at 02:05

## 2025-05-28 RX ADMIN — SENNOSIDES AND DOCUSATE SODIUM 1 TABLET: 50; 8.6 TABLET ORAL at 09:05

## 2025-05-28 RX ADMIN — AMLODIPINE BESYLATE 10 MG: 10 TABLET ORAL at 08:05

## 2025-05-28 RX ADMIN — GABAPENTIN 800 MG: 400 CAPSULE ORAL at 09:05

## 2025-05-28 RX ADMIN — ACETAMINOPHEN 1000 MG: 500 TABLET, FILM COATED ORAL at 05:05

## 2025-05-28 RX ADMIN — HYDROMORPHONE HYDROCHLORIDE 3 MG: 1 INJECTION, SOLUTION INTRAMUSCULAR; INTRAVENOUS; SUBCUTANEOUS at 06:05

## 2025-05-28 RX ADMIN — ACETAMINOPHEN 1000 MG: 500 TABLET, FILM COATED ORAL at 09:05

## 2025-05-28 RX ADMIN — HYDROMORPHONE HYDROCHLORIDE 3 MG: 1 INJECTION, SOLUTION INTRAMUSCULAR; INTRAVENOUS; SUBCUTANEOUS at 03:05

## 2025-05-28 RX ADMIN — ACETAMINOPHEN 1000 MG: 500 TABLET, FILM COATED ORAL at 02:05

## 2025-05-28 RX ADMIN — SENNOSIDES AND DOCUSATE SODIUM 1 TABLET: 50; 8.6 TABLET ORAL at 08:05

## 2025-05-28 RX ADMIN — HYDROMORPHONE HYDROCHLORIDE 3 MG: 1 INJECTION, SOLUTION INTRAMUSCULAR; INTRAVENOUS; SUBCUTANEOUS at 02:05

## 2025-05-28 RX ADMIN — WARFARIN SODIUM 3 MG: 2 TABLET ORAL at 06:05

## 2025-05-28 RX ADMIN — METHOCARBAMOL 500 MG: 500 TABLET ORAL at 06:05

## 2025-05-28 RX ADMIN — DIPHENHYDRAMINE HYDROCHLORIDE 50 MG: 50 INJECTION, SOLUTION INTRAMUSCULAR; INTRAVENOUS at 06:05

## 2025-05-28 RX ADMIN — METHOCARBAMOL 500 MG: 500 TABLET ORAL at 09:05

## 2025-05-28 RX ADMIN — DIPHENHYDRAMINE HYDROCHLORIDE 50 MG: 50 INJECTION, SOLUTION INTRAMUSCULAR; INTRAVENOUS at 11:05

## 2025-05-28 RX ADMIN — OXYCODONE HYDROCHLORIDE AND ACETAMINOPHEN 500 MG: 500 TABLET ORAL at 08:05

## 2025-05-28 RX ADMIN — MORPHINE SULFATE 30 MG: 15 TABLET, FILM COATED, EXTENDED RELEASE ORAL at 09:05

## 2025-05-28 RX ADMIN — GABAPENTIN 800 MG: 400 CAPSULE ORAL at 02:05

## 2025-05-28 RX ADMIN — FLUOXETINE HYDROCHLORIDE 80 MG: 20 CAPSULE ORAL at 08:05

## 2025-05-28 RX ADMIN — FOLIC ACID 1 MG: 1 TABLET ORAL at 08:05

## 2025-05-28 RX ADMIN — HYDROMORPHONE HYDROCHLORIDE 3 MG: 1 INJECTION, SOLUTION INTRAMUSCULAR; INTRAVENOUS; SUBCUTANEOUS at 05:05

## 2025-05-28 RX ADMIN — PANTOPRAZOLE SODIUM 40 MG: 40 TABLET, DELAYED RELEASE ORAL at 08:05

## 2025-05-29 LAB
ABSOLUTE EOSINOPHIL (OHS): 0.38 K/UL
ABSOLUTE MONOCYTE (OHS): 0.74 K/UL (ref 0.3–1)
ABSOLUTE NEUTROPHIL COUNT (OHS): 2.47 K/UL (ref 1.8–7.7)
ALBUMIN SERPL BCP-MCNC: 3.1 G/DL (ref 3.5–5.2)
ALP SERPL-CCNC: 74 UNIT/L (ref 40–150)
ALT SERPL W/O P-5'-P-CCNC: 16 UNIT/L (ref 10–44)
ANION GAP (OHS): 7 MMOL/L (ref 8–16)
AST SERPL-CCNC: 21 UNIT/L (ref 11–45)
BASOPHILS # BLD AUTO: 0.04 K/UL
BASOPHILS NFR BLD AUTO: 0.6 %
BILIRUB SERPL-MCNC: 0.3 MG/DL (ref 0.1–1)
BUN SERPL-MCNC: 12 MG/DL (ref 6–20)
CALCIUM SERPL-MCNC: 8.4 MG/DL (ref 8.7–10.5)
CHLORIDE SERPL-SCNC: 107 MMOL/L (ref 95–110)
CO2 SERPL-SCNC: 27 MMOL/L (ref 23–29)
CREAT SERPL-MCNC: 0.9 MG/DL (ref 0.5–1.4)
ERYTHROCYTE [DISTWIDTH] IN BLOOD BY AUTOMATED COUNT: 24.4 % (ref 11.5–14.5)
GFR SERPLBLD CREATININE-BSD FMLA CKD-EPI: >60 ML/MIN/1.73/M2
GLUCOSE SERPL-MCNC: 95 MG/DL (ref 70–110)
HCT VFR BLD AUTO: 28.5 % (ref 37–48.5)
HGB BLD-MCNC: 9.3 GM/DL (ref 12–16)
IMM GRANULOCYTES # BLD AUTO: 0 K/UL (ref 0–0.04)
IMM GRANULOCYTES NFR BLD AUTO: 0 % (ref 0–0.5)
INR PPP: 1.6 (ref 0.8–1.2)
LYMPHOCYTES # BLD AUTO: 2.85 K/UL (ref 1–4.8)
MCH RBC QN AUTO: 26.6 PG (ref 27–31)
MCHC RBC AUTO-ENTMCNC: 32.6 G/DL (ref 32–36)
MCV RBC AUTO: 82 FL (ref 82–98)
NUCLEATED RBC (/100WBC) (OHS): 2 /100 WBC
PLATELET # BLD AUTO: 263 K/UL (ref 150–450)
PMV BLD AUTO: 9 FL (ref 9.2–12.9)
POTASSIUM SERPL-SCNC: 3.9 MMOL/L (ref 3.5–5.1)
PROT SERPL-MCNC: 6.6 GM/DL (ref 6–8.4)
PROTHROMBIN TIME: 17.3 SECONDS (ref 9–12.5)
RBC # BLD AUTO: 3.49 M/UL (ref 4–5.4)
RELATIVE EOSINOPHIL (OHS): 5.9 %
RELATIVE LYMPHOCYTE (OHS): 44 % (ref 18–48)
RELATIVE MONOCYTE (OHS): 11.4 % (ref 4–15)
RELATIVE NEUTROPHIL (OHS): 38.1 % (ref 38–73)
SODIUM SERPL-SCNC: 141 MMOL/L (ref 136–145)
WBC # BLD AUTO: 6.48 K/UL (ref 3.9–12.7)

## 2025-05-29 PROCEDURE — 25000003 PHARM REV CODE 250: Performed by: NURSE PRACTITIONER

## 2025-05-29 PROCEDURE — 36415 COLL VENOUS BLD VENIPUNCTURE: CPT

## 2025-05-29 PROCEDURE — 25000003 PHARM REV CODE 250: Performed by: HOSPITALIST

## 2025-05-29 PROCEDURE — 85025 COMPLETE CBC W/AUTO DIFF WBC: CPT

## 2025-05-29 PROCEDURE — 82947 ASSAY GLUCOSE BLOOD QUANT: CPT

## 2025-05-29 PROCEDURE — 63600175 PHARM REV CODE 636 W HCPCS: Performed by: HOSPITALIST

## 2025-05-29 PROCEDURE — 85610 PROTHROMBIN TIME: CPT

## 2025-05-29 PROCEDURE — 11000001 HC ACUTE MED/SURG PRIVATE ROOM

## 2025-05-29 PROCEDURE — 25000003 PHARM REV CODE 250

## 2025-05-29 PROCEDURE — 63600175 PHARM REV CODE 636 W HCPCS: Performed by: STUDENT IN AN ORGANIZED HEALTH CARE EDUCATION/TRAINING PROGRAM

## 2025-05-29 RX ORDER — WARFARIN 1 MG/1
3 TABLET ORAL DAILY
Qty: 42 TABLET | Refills: 0 | Status: SHIPPED | OUTPATIENT
Start: 2025-05-29 | End: 2025-05-29 | Stop reason: HOSPADM

## 2025-05-29 RX ORDER — FLUTICASONE PROPIONATE 50 MCG
1 SPRAY, SUSPENSION (ML) NASAL DAILY PRN
Start: 2025-05-29

## 2025-05-29 RX ORDER — WARFARIN 1 MG/1
3 TABLET ORAL DAILY
Qty: 42 TABLET | Refills: 0 | Status: SHIPPED | OUTPATIENT
Start: 2025-05-29 | End: 2025-05-29

## 2025-05-29 RX ORDER — WARFARIN 1 MG/1
3 TABLET ORAL DAILY
Qty: 42 TABLET | Refills: 0 | Status: SHIPPED | OUTPATIENT
Start: 2025-05-29

## 2025-05-29 RX ADMIN — MORPHINE SULFATE 30 MG: 15 TABLET, FILM COATED, EXTENDED RELEASE ORAL at 08:05

## 2025-05-29 RX ADMIN — ACETAMINOPHEN 1000 MG: 500 TABLET, FILM COATED ORAL at 06:05

## 2025-05-29 RX ADMIN — DIPHENHYDRAMINE HYDROCHLORIDE 50 MG: 50 INJECTION, SOLUTION INTRAMUSCULAR; INTRAVENOUS at 12:05

## 2025-05-29 RX ADMIN — HYDROMORPHONE HYDROCHLORIDE 3 MG: 1 INJECTION, SOLUTION INTRAMUSCULAR; INTRAVENOUS; SUBCUTANEOUS at 03:05

## 2025-05-29 RX ADMIN — HYDROMORPHONE HYDROCHLORIDE 3 MG: 1 INJECTION, SOLUTION INTRAMUSCULAR; INTRAVENOUS; SUBCUTANEOUS at 09:05

## 2025-05-29 RX ADMIN — HYDROMORPHONE HYDROCHLORIDE 3 MG: 1 INJECTION, SOLUTION INTRAMUSCULAR; INTRAVENOUS; SUBCUTANEOUS at 12:05

## 2025-05-29 RX ADMIN — GABAPENTIN 800 MG: 400 CAPSULE ORAL at 09:05

## 2025-05-29 RX ADMIN — TIZANIDINE 4 MG: 2 TABLET ORAL at 08:05

## 2025-05-29 RX ADMIN — WARFARIN SODIUM 3 MG: 2 TABLET ORAL at 06:05

## 2025-05-29 RX ADMIN — DIPHENHYDRAMINE HYDROCHLORIDE 50 MG: 50 INJECTION, SOLUTION INTRAMUSCULAR; INTRAVENOUS at 03:05

## 2025-05-29 RX ADMIN — GABAPENTIN 800 MG: 400 CAPSULE ORAL at 08:05

## 2025-05-29 RX ADMIN — MORPHINE SULFATE 30 MG: 15 TABLET, FILM COATED, EXTENDED RELEASE ORAL at 09:05

## 2025-05-29 RX ADMIN — DIPHENHYDRAMINE HYDROCHLORIDE 50 MG: 50 INJECTION, SOLUTION INTRAMUSCULAR; INTRAVENOUS at 09:05

## 2025-05-29 RX ADMIN — SENNOSIDES AND DOCUSATE SODIUM 1 TABLET: 50; 8.6 TABLET ORAL at 08:05

## 2025-05-29 RX ADMIN — FOLIC ACID 1 MG: 1 TABLET ORAL at 08:05

## 2025-05-29 RX ADMIN — ACETAMINOPHEN 1000 MG: 500 TABLET, FILM COATED ORAL at 09:05

## 2025-05-29 RX ADMIN — TIZANIDINE 4 MG: 2 TABLET ORAL at 06:05

## 2025-05-29 RX ADMIN — FLUOXETINE HYDROCHLORIDE 80 MG: 20 CAPSULE ORAL at 08:05

## 2025-05-29 RX ADMIN — PROMETHAZINE HYDROCHLORIDE 25 MG: 12.5 TABLET ORAL at 01:05

## 2025-05-29 RX ADMIN — SENNOSIDES AND DOCUSATE SODIUM 1 TABLET: 50; 8.6 TABLET ORAL at 09:05

## 2025-05-29 RX ADMIN — OXYCODONE HYDROCHLORIDE AND ACETAMINOPHEN 500 MG: 500 TABLET ORAL at 08:05

## 2025-05-29 RX ADMIN — HYDROMORPHONE HYDROCHLORIDE 3 MG: 1 INJECTION, SOLUTION INTRAMUSCULAR; INTRAVENOUS; SUBCUTANEOUS at 06:05

## 2025-05-29 RX ADMIN — ACETAMINOPHEN 1000 MG: 500 TABLET, FILM COATED ORAL at 01:05

## 2025-05-29 RX ADMIN — GABAPENTIN 800 MG: 400 CAPSULE ORAL at 03:05

## 2025-05-29 RX ADMIN — AMLODIPINE BESYLATE 10 MG: 10 TABLET ORAL at 08:05

## 2025-05-29 RX ADMIN — DIPHENHYDRAMINE HYDROCHLORIDE 50 MG: 50 INJECTION, SOLUTION INTRAMUSCULAR; INTRAVENOUS at 06:05

## 2025-05-29 RX ADMIN — PANTOPRAZOLE SODIUM 40 MG: 40 TABLET, DELAYED RELEASE ORAL at 08:05

## 2025-05-29 RX ADMIN — TIZANIDINE 4 MG: 2 TABLET ORAL at 12:05

## 2025-05-30 VITALS
BODY MASS INDEX: 45.28 KG/M2 | RESPIRATION RATE: 18 BRPM | HEIGHT: 62 IN | DIASTOLIC BLOOD PRESSURE: 83 MMHG | TEMPERATURE: 98 F | WEIGHT: 246.06 LBS | SYSTOLIC BLOOD PRESSURE: 140 MMHG | OXYGEN SATURATION: 98 % | HEART RATE: 90 BPM

## 2025-05-30 LAB
ABSOLUTE EOSINOPHIL (OHS): 0.31 K/UL
ABSOLUTE MONOCYTE (OHS): 0.92 K/UL (ref 0.3–1)
ABSOLUTE NEUTROPHIL COUNT (OHS): 5.11 K/UL (ref 1.8–7.7)
ALBUMIN SERPL BCP-MCNC: 3.3 G/DL (ref 3.5–5.2)
ALP SERPL-CCNC: 90 UNIT/L (ref 40–150)
ALT SERPL W/O P-5'-P-CCNC: 15 UNIT/L (ref 10–44)
ANION GAP (OHS): 9 MMOL/L (ref 8–16)
AST SERPL-CCNC: 23 UNIT/L (ref 11–45)
BASOPHILS # BLD AUTO: 0.05 K/UL
BASOPHILS NFR BLD AUTO: 0.6 %
BILIRUB SERPL-MCNC: 0.4 MG/DL (ref 0.1–1)
BUN SERPL-MCNC: 9 MG/DL (ref 6–20)
CALCIUM SERPL-MCNC: 9.1 MG/DL (ref 8.7–10.5)
CHLORIDE SERPL-SCNC: 105 MMOL/L (ref 95–110)
CO2 SERPL-SCNC: 26 MMOL/L (ref 23–29)
CREAT SERPL-MCNC: 1 MG/DL (ref 0.5–1.4)
ERYTHROCYTE [DISTWIDTH] IN BLOOD BY AUTOMATED COUNT: 24.4 % (ref 11.5–14.5)
GFR SERPLBLD CREATININE-BSD FMLA CKD-EPI: >60 ML/MIN/1.73/M2
GLUCOSE SERPL-MCNC: 87 MG/DL (ref 70–110)
HCT VFR BLD AUTO: 30 % (ref 37–48.5)
HGB BLD-MCNC: 10 GM/DL (ref 12–16)
IMM GRANULOCYTES # BLD AUTO: 0.02 K/UL (ref 0–0.04)
IMM GRANULOCYTES NFR BLD AUTO: 0.2 % (ref 0–0.5)
INR PPP: 1.6 (ref 0.8–1.2)
LYMPHOCYTES # BLD AUTO: 2.43 K/UL (ref 1–4.8)
MCH RBC QN AUTO: 26.8 PG (ref 27–31)
MCHC RBC AUTO-ENTMCNC: 33.3 G/DL (ref 32–36)
MCV RBC AUTO: 80 FL (ref 82–98)
NUCLEATED RBC (/100WBC) (OHS): 1 /100 WBC
PLATELET # BLD AUTO: 328 K/UL (ref 150–450)
PMV BLD AUTO: 9.1 FL (ref 9.2–12.9)
POTASSIUM SERPL-SCNC: 3.8 MMOL/L (ref 3.5–5.1)
PROT SERPL-MCNC: 7.1 GM/DL (ref 6–8.4)
PROTHROMBIN TIME: 17.2 SECONDS (ref 9–12.5)
RBC # BLD AUTO: 3.73 M/UL (ref 4–5.4)
RELATIVE EOSINOPHIL (OHS): 3.5 %
RELATIVE LYMPHOCYTE (OHS): 27.5 % (ref 18–48)
RELATIVE MONOCYTE (OHS): 10.4 % (ref 4–15)
RELATIVE NEUTROPHIL (OHS): 57.8 % (ref 38–73)
SODIUM SERPL-SCNC: 140 MMOL/L (ref 136–145)
WBC # BLD AUTO: 8.84 K/UL (ref 3.9–12.7)

## 2025-05-30 PROCEDURE — 85610 PROTHROMBIN TIME: CPT

## 2025-05-30 PROCEDURE — 94761 N-INVAS EAR/PLS OXIMETRY MLT: CPT

## 2025-05-30 PROCEDURE — C1751 CATH, INF, PER/CENT/MIDLINE: HCPCS

## 2025-05-30 PROCEDURE — 25000003 PHARM REV CODE 250: Performed by: NURSE PRACTITIONER

## 2025-05-30 PROCEDURE — 63600175 PHARM REV CODE 636 W HCPCS: Performed by: HOSPITALIST

## 2025-05-30 PROCEDURE — 36415 COLL VENOUS BLD VENIPUNCTURE: CPT

## 2025-05-30 PROCEDURE — 85025 COMPLETE CBC W/AUTO DIFF WBC: CPT

## 2025-05-30 PROCEDURE — 63600175 PHARM REV CODE 636 W HCPCS: Performed by: STUDENT IN AN ORGANIZED HEALTH CARE EDUCATION/TRAINING PROGRAM

## 2025-05-30 PROCEDURE — 80053 COMPREHEN METABOLIC PANEL: CPT

## 2025-05-30 PROCEDURE — 25000003 PHARM REV CODE 250

## 2025-05-30 RX ADMIN — DIPHENHYDRAMINE HYDROCHLORIDE 50 MG: 50 INJECTION, SOLUTION INTRAMUSCULAR; INTRAVENOUS at 03:05

## 2025-05-30 RX ADMIN — OXYCODONE HYDROCHLORIDE AND ACETAMINOPHEN 500 MG: 500 TABLET ORAL at 10:05

## 2025-05-30 RX ADMIN — GABAPENTIN 800 MG: 400 CAPSULE ORAL at 10:05

## 2025-05-30 RX ADMIN — HYDROMORPHONE HYDROCHLORIDE 3 MG: 1 INJECTION, SOLUTION INTRAMUSCULAR; INTRAVENOUS; SUBCUTANEOUS at 06:05

## 2025-05-30 RX ADMIN — FLUTICASONE PROPIONATE 50 MCG: 50 SPRAY, METERED NASAL at 10:05

## 2025-05-30 RX ADMIN — METHOCARBAMOL 500 MG: 500 TABLET ORAL at 10:05

## 2025-05-30 RX ADMIN — DIPHENHYDRAMINE HYDROCHLORIDE 50 MG: 50 INJECTION, SOLUTION INTRAMUSCULAR; INTRAVENOUS at 09:05

## 2025-05-30 RX ADMIN — SENNOSIDES AND DOCUSATE SODIUM 1 TABLET: 50; 8.6 TABLET ORAL at 10:05

## 2025-05-30 RX ADMIN — DIPHENHYDRAMINE HYDROCHLORIDE 50 MG: 50 INJECTION, SOLUTION INTRAMUSCULAR; INTRAVENOUS at 06:05

## 2025-05-30 RX ADMIN — HYDROMORPHONE HYDROCHLORIDE 3 MG: 1 INJECTION, SOLUTION INTRAMUSCULAR; INTRAVENOUS; SUBCUTANEOUS at 12:05

## 2025-05-30 RX ADMIN — HYDROMORPHONE HYDROCHLORIDE 3 MG: 1 INJECTION, SOLUTION INTRAMUSCULAR; INTRAVENOUS; SUBCUTANEOUS at 09:05

## 2025-05-30 RX ADMIN — FOLIC ACID 1 MG: 1 TABLET ORAL at 10:05

## 2025-05-30 RX ADMIN — TIZANIDINE 4 MG: 2 TABLET ORAL at 12:05

## 2025-05-30 RX ADMIN — FLUOXETINE HYDROCHLORIDE 80 MG: 20 CAPSULE ORAL at 10:05

## 2025-05-30 RX ADMIN — MORPHINE SULFATE 30 MG: 15 TABLET, FILM COATED, EXTENDED RELEASE ORAL at 10:05

## 2025-05-30 RX ADMIN — ACETAMINOPHEN 1000 MG: 500 TABLET, FILM COATED ORAL at 06:05

## 2025-05-30 RX ADMIN — DIPHENHYDRAMINE HYDROCHLORIDE 50 MG: 50 INJECTION, SOLUTION INTRAMUSCULAR; INTRAVENOUS at 12:05

## 2025-05-30 RX ADMIN — PANTOPRAZOLE SODIUM 40 MG: 40 TABLET, DELAYED RELEASE ORAL at 10:05

## 2025-05-30 RX ADMIN — AMLODIPINE BESYLATE 10 MG: 10 TABLET ORAL at 10:05

## 2025-05-30 RX ADMIN — HYDROMORPHONE HYDROCHLORIDE 3 MG: 1 INJECTION, SOLUTION INTRAMUSCULAR; INTRAVENOUS; SUBCUTANEOUS at 03:05

## 2025-05-30 RX ADMIN — TIZANIDINE 4 MG: 2 TABLET ORAL at 04:05

## 2025-06-02 ENCOUNTER — ANTI-COAG VISIT (OUTPATIENT)
Dept: CARDIOLOGY | Facility: CLINIC | Age: 45
End: 2025-06-02
Payer: MEDICARE

## 2025-06-02 ENCOUNTER — TELEPHONE (OUTPATIENT)
Dept: HEMATOLOGY/ONCOLOGY | Facility: CLINIC | Age: 45
End: 2025-06-02
Payer: MEDICARE

## 2025-06-02 DIAGNOSIS — Z86.711 HISTORY OF PULMONARY EMBOLISM: Chronic | ICD-10-CM

## 2025-06-02 DIAGNOSIS — Z79.01 WARFARIN ANTICOAGULATION: Primary | ICD-10-CM

## 2025-06-04 DIAGNOSIS — D57.00 HB-SS DISEASE WITH VASO-OCCLUSIVE PAIN: ICD-10-CM

## 2025-06-04 RX ORDER — TIZANIDINE 4 MG/1
4 TABLET ORAL EVERY 8 HOURS PRN
Qty: 21 TABLET | Refills: 0 | Status: SHIPPED | OUTPATIENT
Start: 2025-06-04 | End: 2025-06-11

## 2025-06-05 ENCOUNTER — LAB REQUISITION (OUTPATIENT)
Dept: LAB | Facility: HOSPITAL | Age: 45
End: 2025-06-05
Payer: MEDICARE

## 2025-06-05 ENCOUNTER — PATIENT MESSAGE (OUTPATIENT)
Dept: HEMATOLOGY/ONCOLOGY | Facility: CLINIC | Age: 45
End: 2025-06-05
Payer: MEDICARE

## 2025-06-05 DIAGNOSIS — I82.C22 CHRONIC EMBOLISM AND THROMBOSIS OF LEFT INTERNAL JUGULAR VEIN: ICD-10-CM

## 2025-06-05 DIAGNOSIS — I82.C11 ACUTE EMBOLISM AND THROMBOSIS OF RIGHT INTERNAL JUGULAR VEIN: ICD-10-CM

## 2025-06-05 LAB
INR PPP: 4.5 (ref 0.8–1.2)
PROTHROMBIN TIME: 44.4 SECONDS (ref 9–12.5)

## 2025-06-05 PROCEDURE — 85610 PROTHROMBIN TIME: CPT | Performed by: INTERNAL MEDICINE

## 2025-06-06 ENCOUNTER — ANTI-COAG VISIT (OUTPATIENT)
Dept: CARDIOLOGY | Facility: CLINIC | Age: 45
End: 2025-06-06

## 2025-06-06 ENCOUNTER — NURSE TRIAGE (OUTPATIENT)
Dept: ADMINISTRATIVE | Facility: CLINIC | Age: 45
End: 2025-06-06

## 2025-06-06 DIAGNOSIS — Z79.01 WARFARIN ANTICOAGULATION: Primary | ICD-10-CM

## 2025-06-06 DIAGNOSIS — Z86.711 HISTORY OF PULMONARY EMBOLISM: Chronic | ICD-10-CM

## 2025-06-09 ENCOUNTER — PATIENT MESSAGE (OUTPATIENT)
Dept: CARDIOLOGY | Facility: CLINIC | Age: 45
End: 2025-06-09
Payer: MEDICARE

## 2025-06-13 ENCOUNTER — PATIENT MESSAGE (OUTPATIENT)
Dept: CARDIOLOGY | Facility: CLINIC | Age: 45
End: 2025-06-13
Payer: MEDICARE

## 2025-06-15 ENCOUNTER — HOSPITAL ENCOUNTER (INPATIENT)
Facility: OTHER | Age: 45
LOS: 6 days | Discharge: HOME | DRG: 812 | End: 2025-06-21
Attending: EMERGENCY MEDICINE | Admitting: HOSPITALIST
Payer: MEDICARE

## 2025-06-15 DIAGNOSIS — R07.9 CHEST PAIN: ICD-10-CM

## 2025-06-15 DIAGNOSIS — D57.00 SICKLE CELL PAIN CRISIS: Primary | ICD-10-CM

## 2025-06-15 PROBLEM — J45.909 ASTHMA: Status: ACTIVE | Noted: 2025-06-15

## 2025-06-15 LAB
ABSOLUTE EOSINOPHIL (OHS): 0.28 K/UL
ABSOLUTE MONOCYTE (OHS): 0.98 K/UL (ref 0.3–1)
ABSOLUTE NEUTROPHIL COUNT (OHS): 4.75 K/UL (ref 1.8–7.7)
ALBUMIN SERPL BCP-MCNC: 3.2 G/DL (ref 3.5–5.2)
ALP SERPL-CCNC: 66 UNIT/L (ref 40–150)
ALT SERPL W/O P-5'-P-CCNC: 16 UNIT/L (ref 10–44)
ANION GAP (OHS): 11 MMOL/L (ref 8–16)
AST SERPL-CCNC: 20 UNIT/L (ref 11–45)
BASOPHILS # BLD AUTO: 0.05 K/UL
BASOPHILS NFR BLD AUTO: 0.6 %
BILIRUB SERPL-MCNC: 0.3 MG/DL (ref 0.1–1)
BUN SERPL-MCNC: 17 MG/DL (ref 6–20)
CALCIUM SERPL-MCNC: 9.1 MG/DL (ref 8.7–10.5)
CHLORIDE SERPL-SCNC: 113 MMOL/L (ref 95–110)
CO2 SERPL-SCNC: 23 MMOL/L (ref 23–29)
CREAT SERPL-MCNC: 1.2 MG/DL (ref 0.5–1.4)
ERYTHROCYTE [DISTWIDTH] IN BLOOD BY AUTOMATED COUNT: 25.8 % (ref 11.5–14.5)
GFR SERPLBLD CREATININE-BSD FMLA CKD-EPI: 56 ML/MIN/1.73/M2
GLUCOSE SERPL-MCNC: 144 MG/DL (ref 70–110)
HCT VFR BLD AUTO: 28.3 % (ref 37–48.5)
HGB BLD-MCNC: 9.2 GM/DL (ref 12–16)
IMM GRANULOCYTES # BLD AUTO: 0.05 K/UL (ref 0–0.04)
IMM GRANULOCYTES NFR BLD AUTO: 0.6 % (ref 0–0.5)
LYMPHOCYTES # BLD AUTO: 2.6 K/UL (ref 1–4.8)
MCH RBC QN AUTO: 25.7 PG (ref 27–31)
MCHC RBC AUTO-ENTMCNC: 32.5 G/DL (ref 32–36)
MCV RBC AUTO: 79 FL (ref 82–98)
NUCLEATED RBC (/100WBC) (OHS): 2 /100 WBC
PLATELET # BLD AUTO: 320 K/UL (ref 150–450)
PMV BLD AUTO: 9.5 FL (ref 9.2–12.9)
POTASSIUM SERPL-SCNC: 3.8 MMOL/L (ref 3.5–5.1)
PROT SERPL-MCNC: 6.9 GM/DL (ref 6–8.4)
RBC # BLD AUTO: 3.58 M/UL (ref 4–5.4)
RELATIVE EOSINOPHIL (OHS): 3.2 %
RELATIVE LYMPHOCYTE (OHS): 29.9 % (ref 18–48)
RELATIVE MONOCYTE (OHS): 11.3 % (ref 4–15)
RELATIVE NEUTROPHIL (OHS): 54.4 % (ref 38–73)
RETICS/RBC NFR AUTO: 4.1 % (ref 0.5–2.5)
SODIUM SERPL-SCNC: 147 MMOL/L (ref 136–145)
TROPONIN I SERPL DL<=0.01 NG/ML-MCNC: 0.02 NG/ML
WBC # BLD AUTO: 8.71 K/UL (ref 3.9–12.7)

## 2025-06-15 PROCEDURE — 96375 TX/PRO/DX INJ NEW DRUG ADDON: CPT

## 2025-06-15 PROCEDURE — 21400001 HC TELEMETRY ROOM

## 2025-06-15 PROCEDURE — 94761 N-INVAS EAR/PLS OXIMETRY MLT: CPT

## 2025-06-15 PROCEDURE — 96361 HYDRATE IV INFUSION ADD-ON: CPT

## 2025-06-15 PROCEDURE — 96374 THER/PROPH/DIAG INJ IV PUSH: CPT

## 2025-06-15 PROCEDURE — 80053 COMPREHEN METABOLIC PANEL: CPT | Performed by: NURSE PRACTITIONER

## 2025-06-15 PROCEDURE — 85045 AUTOMATED RETICULOCYTE COUNT: CPT | Performed by: NURSE PRACTITIONER

## 2025-06-15 PROCEDURE — 85025 COMPLETE CBC W/AUTO DIFF WBC: CPT | Performed by: NURSE PRACTITIONER

## 2025-06-15 PROCEDURE — 84484 ASSAY OF TROPONIN QUANT: CPT | Performed by: NURSE PRACTITIONER

## 2025-06-15 PROCEDURE — 63600175 PHARM REV CODE 636 W HCPCS: Performed by: NURSE PRACTITIONER

## 2025-06-15 PROCEDURE — 25000003 PHARM REV CODE 250: Performed by: NURSE PRACTITIONER

## 2025-06-15 PROCEDURE — 99285 EMERGENCY DEPT VISIT HI MDM: CPT | Mod: 25

## 2025-06-15 RX ORDER — AMLODIPINE BESYLATE 5 MG/1
10 TABLET ORAL DAILY
Status: DISCONTINUED | OUTPATIENT
Start: 2025-06-16 | End: 2025-06-21 | Stop reason: HOSPADM

## 2025-06-15 RX ORDER — FLUOXETINE 20 MG/1
80 CAPSULE ORAL DAILY
Status: DISCONTINUED | OUTPATIENT
Start: 2025-06-16 | End: 2025-06-21 | Stop reason: HOSPADM

## 2025-06-15 RX ORDER — SODIUM CHLORIDE 0.9 % (FLUSH) 0.9 %
10 SYRINGE (ML) INJECTION
Status: DISCONTINUED | OUTPATIENT
Start: 2025-06-15 | End: 2025-06-15

## 2025-06-15 RX ORDER — NALOXONE HCL 0.4 MG/ML
0.02 VIAL (ML) INJECTION
Status: DISCONTINUED | OUTPATIENT
Start: 2025-06-15 | End: 2025-06-21 | Stop reason: HOSPADM

## 2025-06-15 RX ORDER — DIPHENHYDRAMINE HYDROCHLORIDE 50 MG/ML
25 INJECTION, SOLUTION INTRAMUSCULAR; INTRAVENOUS
Status: DISCONTINUED | OUTPATIENT
Start: 2025-06-15 | End: 2025-06-21 | Stop reason: HOSPADM

## 2025-06-15 RX ORDER — SODIUM CHLORIDE 0.9 % (FLUSH) 0.9 %
10 SYRINGE (ML) INJECTION EVERY 8 HOURS PRN
Status: DISCONTINUED | OUTPATIENT
Start: 2025-06-15 | End: 2025-06-21 | Stop reason: HOSPADM

## 2025-06-15 RX ORDER — TIZANIDINE 4 MG/1
4 TABLET ORAL EVERY 8 HOURS PRN
Status: DISCONTINUED | OUTPATIENT
Start: 2025-06-15 | End: 2025-06-21 | Stop reason: HOSPADM

## 2025-06-15 RX ORDER — OXYCODONE HYDROCHLORIDE 10 MG/1
10 TABLET ORAL EVERY 6 HOURS PRN
Refills: 0 | Status: DISCONTINUED | OUTPATIENT
Start: 2025-06-15 | End: 2025-06-16

## 2025-06-15 RX ORDER — HYDROMORPHONE HYDROCHLORIDE 1 MG/ML
1 INJECTION, SOLUTION INTRAMUSCULAR; INTRAVENOUS; SUBCUTANEOUS
Refills: 0 | Status: COMPLETED | OUTPATIENT
Start: 2025-06-15 | End: 2025-06-15

## 2025-06-15 RX ORDER — SODIUM CHLORIDE, SODIUM LACTATE, POTASSIUM CHLORIDE, CALCIUM CHLORIDE 600; 310; 30; 20 MG/100ML; MG/100ML; MG/100ML; MG/100ML
INJECTION, SOLUTION INTRAVENOUS CONTINUOUS
Status: DISCONTINUED | OUTPATIENT
Start: 2025-06-15 | End: 2025-06-19

## 2025-06-15 RX ORDER — PANTOPRAZOLE SODIUM 40 MG/1
40 TABLET, DELAYED RELEASE ORAL DAILY
Status: DISCONTINUED | OUTPATIENT
Start: 2025-06-16 | End: 2025-06-21 | Stop reason: HOSPADM

## 2025-06-15 RX ORDER — DIPHENHYDRAMINE HYDROCHLORIDE 50 MG/ML
12.5 INJECTION, SOLUTION INTRAMUSCULAR; INTRAVENOUS
Status: COMPLETED | OUTPATIENT
Start: 2025-06-15 | End: 2025-06-15

## 2025-06-15 RX ORDER — HYDROMORPHONE HYDROCHLORIDE 2 MG/ML
2 INJECTION, SOLUTION INTRAMUSCULAR; INTRAVENOUS; SUBCUTANEOUS
Status: DISCONTINUED | OUTPATIENT
Start: 2025-06-15 | End: 2025-06-16

## 2025-06-15 RX ORDER — ACETAMINOPHEN 325 MG/1
650 TABLET ORAL EVERY 8 HOURS PRN
Status: DISCONTINUED | OUTPATIENT
Start: 2025-06-15 | End: 2025-06-21 | Stop reason: HOSPADM

## 2025-06-15 RX ORDER — MORPHINE SULFATE 15 MG/1
30 TABLET, FILM COATED, EXTENDED RELEASE ORAL EVERY 12 HOURS
Refills: 0 | Status: DISCONTINUED | OUTPATIENT
Start: 2025-06-15 | End: 2025-06-21 | Stop reason: HOSPADM

## 2025-06-15 RX ORDER — TALC
6 POWDER (GRAM) TOPICAL NIGHTLY PRN
Status: DISCONTINUED | OUTPATIENT
Start: 2025-06-15 | End: 2025-06-21 | Stop reason: HOSPADM

## 2025-06-15 RX ORDER — DROPERIDOL 2.5 MG/ML
2.5 INJECTION, SOLUTION INTRAMUSCULAR; INTRAVENOUS
Status: COMPLETED | OUTPATIENT
Start: 2025-06-15 | End: 2025-06-15

## 2025-06-15 RX ORDER — PRAZOSIN HYDROCHLORIDE 1 MG/1
1 CAPSULE ORAL NIGHTLY
Status: DISCONTINUED | OUTPATIENT
Start: 2025-06-16 | End: 2025-06-21 | Stop reason: HOSPADM

## 2025-06-15 RX ORDER — MUPIROCIN 20 MG/G
OINTMENT TOPICAL 2 TIMES DAILY
Status: DISPENSED | OUTPATIENT
Start: 2025-06-15 | End: 2025-06-20

## 2025-06-15 RX ORDER — GABAPENTIN 300 MG/1
900 CAPSULE ORAL 3 TIMES DAILY
Status: DISCONTINUED | OUTPATIENT
Start: 2025-06-15 | End: 2025-06-21 | Stop reason: HOSPADM

## 2025-06-15 RX ORDER — ASCORBIC ACID 500 MG
1000 TABLET ORAL DAILY
Status: DISCONTINUED | OUTPATIENT
Start: 2025-06-16 | End: 2025-06-21 | Stop reason: HOSPADM

## 2025-06-15 RX ORDER — AMOXICILLIN 250 MG
1 CAPSULE ORAL 2 TIMES DAILY
Status: DISCONTINUED | OUTPATIENT
Start: 2025-06-15 | End: 2025-06-16

## 2025-06-15 RX ORDER — DIPHENHYDRAMINE HYDROCHLORIDE 50 MG/ML
25 INJECTION, SOLUTION INTRAMUSCULAR; INTRAVENOUS EVERY 6 HOURS PRN
Status: DISCONTINUED | OUTPATIENT
Start: 2025-06-15 | End: 2025-06-15

## 2025-06-15 RX ORDER — ACETAMINOPHEN 500 MG
1000 TABLET ORAL EVERY 8 HOURS
Status: DISCONTINUED | OUTPATIENT
Start: 2025-06-15 | End: 2025-06-21 | Stop reason: HOSPADM

## 2025-06-15 RX ORDER — FOLIC ACID 1 MG/1
1 TABLET ORAL DAILY
Status: DISCONTINUED | OUTPATIENT
Start: 2025-06-16 | End: 2025-06-21 | Stop reason: HOSPADM

## 2025-06-15 RX ORDER — ONDANSETRON HYDROCHLORIDE 2 MG/ML
4 INJECTION, SOLUTION INTRAVENOUS EVERY 6 HOURS PRN
Status: DISCONTINUED | OUTPATIENT
Start: 2025-06-15 | End: 2025-06-21 | Stop reason: HOSPADM

## 2025-06-15 RX ADMIN — HYDROMORPHONE HYDROCHLORIDE 2 MG: 2 INJECTION INTRAMUSCULAR; INTRAVENOUS; SUBCUTANEOUS at 08:06

## 2025-06-15 RX ADMIN — GABAPENTIN 900 MG: 300 CAPSULE ORAL at 08:06

## 2025-06-15 RX ADMIN — SODIUM CHLORIDE, POTASSIUM CHLORIDE, SODIUM LACTATE AND CALCIUM CHLORIDE: 600; 310; 30; 20 INJECTION, SOLUTION INTRAVENOUS at 06:06

## 2025-06-15 RX ADMIN — TIZANIDINE 4 MG: 4 TABLET ORAL at 10:06

## 2025-06-15 RX ADMIN — DIPHENHYDRAMINE HYDROCHLORIDE 12.5 MG: 50 INJECTION, SOLUTION INTRAMUSCULAR; INTRAVENOUS at 01:06

## 2025-06-15 RX ADMIN — SENNOSIDES, DOCUSATE SODIUM 1 TABLET: 50; 8.6 TABLET, FILM COATED ORAL at 08:06

## 2025-06-15 RX ADMIN — SODIUM CHLORIDE 1000 ML: 9 INJECTION, SOLUTION INTRAVENOUS at 01:06

## 2025-06-15 RX ADMIN — DIPHENHYDRAMINE HYDROCHLORIDE 25 MG: 50 INJECTION, SOLUTION INTRAMUSCULAR; INTRAVENOUS at 08:06

## 2025-06-15 RX ADMIN — MORPHINE SULFATE 30 MG: 15 TABLET, FILM COATED, EXTENDED RELEASE ORAL at 08:06

## 2025-06-15 RX ADMIN — HYDROMORPHONE HYDROCHLORIDE 2 MG: 2 INJECTION INTRAMUSCULAR; INTRAVENOUS; SUBCUTANEOUS at 05:06

## 2025-06-15 RX ADMIN — DIPHENHYDRAMINE HYDROCHLORIDE 25 MG: 50 INJECTION, SOLUTION INTRAMUSCULAR; INTRAVENOUS at 05:06

## 2025-06-15 RX ADMIN — DROPERIDOL 2.5 MG: 2.5 INJECTION, SOLUTION INTRAMUSCULAR; INTRAVENOUS at 01:06

## 2025-06-15 RX ADMIN — HYDROMORPHONE HYDROCHLORIDE 1 MG: 1 INJECTION, SOLUTION INTRAMUSCULAR; INTRAVENOUS; SUBCUTANEOUS at 04:06

## 2025-06-15 NOTE — ASSESSMENT & PLAN NOTE
Patient's blood pressure range in the last 24 hours was: BP  Min: 101/51  Max: 179/106.The patient's inpatient anti-hypertensive regimen is listed below:  Current Antihypertensives  amLODIPine tablet 10 mg, Daily, Oral  prazosin capsule 1 mg, Nightly, Oral    Plan  - BP is controlled, no changes needed to their regimen

## 2025-06-15 NOTE — ASSESSMENT & PLAN NOTE
History of PE x 2 (2020 and 2023 requiring thrombectomy) and chronic bilateral IJ thrombosis    -continue home warfarin based on INR  -PT/INR daily

## 2025-06-15 NOTE — ED NOTES
Pt lying on right side, eyes closed, mouth open and drool noted, pt awakened to ascertain if in pain , pt reports pain is 10/10, provider Martha notified

## 2025-06-15 NOTE — ED PROVIDER NOTES
Source of History:  Patient, chart    Chief complaint:  Sickle Cell Pain Crisis (Bilateral hip and leg pain with nausea. No relief with prescribed medicaitons )      HPI:  Nazanin Malone is a 45 y.o. female with medical history of sickle cell, hypertension, drug dependence, asthma, anxiety, depression, iron-deficiency anemia presenting with bilateral hip pain (worse on the left), bilateral leg pain and nausea.  Patient states symptoms initially began over 1 week ago but worsened last night.  Patient states she was admitted at [facility] for encephalopathy but left against medical advice due to not like in the way she was treated.  Patient states she has been taking her prescribed morphine and oxycodone with no relief of pain.  Patient states pain is causing her to feel short of breath.    This is the extent to the patients complaints today here in the emergency department.    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    Acute venous embolism and thrombosis of internal jugular veins     Asthma     Avascular necrosis of femur     Depression     History of pulmonary embolism     Hypertension     Morbid obesity     Opioid dependence 2017    Pneumonia due to Streptococcus pneumoniae 2017    Right lower lobe pneumonia 2017    Sepsis due to Streptococcus pneumoniae 2017    Sickle cell-beta thalassemia disease with pain     Trigeminal neuralgia      Past Surgical History:   Procedure Laterality Date     SECTION      ESOPHAGOGASTRODUODENOSCOPY N/A 2024    Procedure: EGD (ESOPHAGOGASTRODUODENOSCOPY);  Surgeon: Allen Marshall,  "MD;  Location: [facility] ([address] [place] [place] [address]);  Service: Gastroenterology;  Laterality: N/A;    TONSILLECTOMY      TUBAL LIGATION         Social History[1]    Physical Exam:    /73   Pulse 98   Temp 99.3 °F (37.4 °C) (Oral)   Resp 17   Ht 5' 2" (1.575 m)   Wt 99.8 kg (220 lb)   LMP  (LMP Unknown)   SpO2 97%   Breastfeeding No   BMI 40.24 kg/m²     Nursing note and vital signs reviewed.  Hypertensive on initial exam.  All other vital signs stable.  Obese.  Constitutional: No acute distress.  Appears to be resting comfortably in bed.  Nontoxic  Eyes: No conjunctival injection.Extraocular muscles are intact.  ENT: Oropharynx clear.  Normal phonation.  Mucous membranes pink and moist.  Cardiovascular: Regular rate and rhythm.  No murmurs. No gallops. No rubs  Respiratory: Clear to auscultation bilaterally.  Good air movement.  No wheezes.  No rhonchi. No rales. No accessory muscle use..  Abdomen: Soft.  Not distended.  Nontender.  No guarding.  No rebound. Non-peritoneal.  Musculoskeletal:  Steady gait appreciated.  Patient denies any reproducible pain in lower extremities.  Good range of motion all joints.  No deformities.  Neck supple.  No meningismus.  Plus two DP noted bilaterally.  Skin: No rashes seen.  Good turgor.  No abrasions.  No ecchymoses.  Neurologic: Motor intact.  Sensation intact.  No ataxia. No focal neurological deficits.  Psych: Appropriate, conversant    MDM    Emergent evaluation of an obese [age] yo female presenting for bilateral hip pain (worse on the left), bilateral leg pain and nausea.  Patient states symptoms have been ongoing for over a week but worsened last night.  Patient was recently admitted to [facility] for encephalopathy and acute pain crisis.  Patient states she did sign out against medical advice and has been taking her prescribed home medications with no relief.  On exam pt is A&Ox3.  Hypertensive on initial exam.  All other vital signs stable. Nonfebrile and nontoxic " appearing.  Appears to be resting comfortably in bed laying on right side.  Mucous membranes pink and moist. Breath sounds clear bilaterally.  No rhonchi, rales or wheezing noted on exam.  Abdomen soft and nontender. No rebound or guarding appreciated on exam.   BS WNL.  Pt speaking in full sentences.  Steady gait appreciated.  Active and passive range of motion of all extremities within normal limits.  Patient states pain is not reproducible.  Plus two DP noted to bilateral lower extremities.  No edema noted.  Cap refill < 3 seconds.      History Acquisition   Additional history was acquired from other historians.  Chart    The patient's list of active medical problems, social history, medications, and allergies as documented per RN staff has been reviewed.     Differential Diagnoses   Based on available information and the initial assessment, the working differential diagnoses considered during this evaluation include but are not limited to sickle cell pain crisis, hip necrosis, dehydration, electrolyte abnormality, others.    I will get labs, imaging, hydrate, medicate and reassess.      LABS     Labs Reviewed   COMPREHENSIVE METABOLIC PANEL - Abnormal       Result Value    Sodium 147 (*)     Potassium 3.8      Chloride 113 (*)     CO2 23      Glucose 144 (*)     BUN 17      Creatinine 1.2      Calcium 9.1      Protein Total 6.9      Albumin 3.2 (*)     Bilirubin Total 0.3      ALP 66      AST 20      ALT 16      Anion Gap 11      eGFR 56 (*)    RETICULOCYTES - Abnormal    Retic Count, Automated 4.1 (*)    CBC WITH DIFFERENTIAL - Abnormal    WBC 8.71      RBC 3.58 (*)     HGB 9.2 (*)     HCT 28.3 (*)     MCV 79 (*)     MCH 25.7 (*)     MCHC 32.5      RDW 25.8 (*)     Platelet Count 320      MPV 9.5      Nucleated RBC 2 (*)     Neut % 54.4      Lymph % 29.9      Mono % 11.3      Eos % 3.2      Basophil % 0.6      Imm Grans % 0.6 (*)     Neut # 4.75      Lymph # 2.60      Mono # 0.98      Eos # 0.28      Baso #  0.05      Imm Grans # 0.05 (*)    TROPONIN I - Normal    Troponin-I 0.016     CBC W/ AUTO DIFFERENTIAL    Narrative:     The following orders were created for panel order CBC Auto Differential.  Procedure                               Abnormality         Status                     ---------                               -----------         ------                     CBC with Differential[[id number]]       Abnormal            Final result                 Please view results for these tests on the individual orders.   URINALYSIS, REFLEX TO URINE CULTURE   DRUG SCREEN PANEL, URINE EMERGENCY         Imaging     Imaging Results              X-Ray Chest AP Portable (Final result)  Result time 06/15/25 13:45:21      Final result by Pee Decker MD (06/15/25 13:45:21)                   Impression:      As above      Electronically signed by: Pee Decker MD  Date:    06/15/2025  Time:    13:45               Narrative:    EXAMINATION:  XR CHEST AP PORTABLE    CLINICAL HISTORY:  sickle cell pain;    TECHNIQUE:  Frontal view of the chest was performed.    COMPARISON:  05/28/2025    FINDINGS:  Right chest port in stable position.  The cardiomediastinal silhouette is upper normal in size, similar to prior.    The lungs appear symmetrically expanded with few coarse interstitial markings.  No new confluent pulmonary parenchymal opacity. No pleural fluid or pneumothorax.                                      A radiology report was available for my review at the time of the encounter.    Additional Consideration   All available testing was considered during the course of this workup.  Comorbidities taken into consideration during the patient's evaluation and treatment include weight, age.    Social determinants of health were taken into consideration during development of our treatment plan.    Medications   sodium chloride 0.9% bolus 1,000 mL 1,000 mL (0 mLs Intravenous Stopped 6/15/25 1558)   droPERidol injection 2.5 mg  (2.5 mg Intravenous Given 6/15/25 1334)   diphenhydrAMINE injection 12.5 mg (12.5 mg Intravenous Given 6/15/25 1333)   HYDROmorphone injection 1 mg (1 mg Intravenous Given 6/15/25 1629)      ED Course as of 06/15/25 1631   Sun Emanuel 15, 2025   1345 CMP shows mild hyponatremia at with a sodium of 147.   [RZ]   1404 CBC unremarkable.  No leukocytosis noted.  H&H stable at 9.2 and 28.3.  Reticulocyte count elevated at 4.1.  Chest x-ray independently reviewed by myself and Radiology.  Negative for any acute abnormalities. [RZ]   1501 Patient reassessed.  Resting comfortably.  Will continue to monitor for pain relief. [RZ]   1605 Patient reassessed by nursing staff.  Patient states pain remains at 10 after waking her up.  Will discussed case with hospital medicine [RZ]   1630 Discussed case with hospital medicine.  Will admit for further evaluation and treatment of sickle cell pain crisis.  Awaiting bed assignment. [RZ]      ED Course User Index  [RZ] Karla Thomas NP             CLINICAL IMPRESSION  1. Sickle cell pain crisis         ED Disposition Condition    Admit           Launch MDCalc MDM  MDCalc MDM Module  Emanuel 15 2025 4:30 PM [Karla Thomas]  Data:  - Discussed with external professional: Case discussed with Admitting Provider or a provider/trainee on their team. See MDM section and/or ED Course for additional details on the discussion.  - Independent interpretation: I independently reviewed the XR port Chest AP 1 view. It showed no acute abnormality. [Karla Thomas]  - Test/documents/historian: 3+ tests ordered  Additional encounter diagnoses: Sickle cell pain crisis  Risk: HYDROmorphone injection (Parenteral controlled substances), Admitted (Decision regarding hospitalization)             [1]   Social History  Tobacco Use    Smoking status: Never    Smokeless tobacco: Never   Vaping Use    Vaping status: Never Used   Substance Use Topics    Alcohol use: No    Drug use: Yes     Types: Marijuana     Comment:  periodically         Karla Thomas, NP  06/15/25 [id number]

## 2025-06-15 NOTE — H&P
[facility] [address] Medicine  History & Physical    Patient Name: Nazanin Malone  MRN: [id number]  Patient Class: IP- Inpatient  Admission Date: 6/15/2025  Attending Physician: Glynn Palomares MD   Primary Care Provider: [facility] Primary Doctor         Patient information was obtained from patient, past medical records, and ER records.     Subjective:     Principal Problem:Vaso-occlusive pain due to sickle cell disease    Chief Complaint:   Chief Complaint   Patient presents with    Sickle Cell Pain Crisis     Bilateral hip and leg pain with nausea. 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 She states she has been taking her prescribed medications with minimal relief and her pain worsened last night prompting her to come to ED.  Patient states she was admitted at [facility] for encephalopathy but left against medical advice due to not liking in the way she was treated.  Patient denies chest pain, vomiting fever and chills. She reports shortness of breath that occurs secondary to pain.  She is followed by Dr Soni, Hem/Onc.     ED work up significant for hemoglobin 9.2, sodium 147, reticulocytes 4.1%. Chest xray showed symmetrically expanded lungs with few coarse interstitial markings. Patient received 1L IV NS and IV dilaudid in the ED with minimal improvement. She was referred to hospital medicine and will be admitted for further evaluation and management.     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/2017    Acute venous embolism and  thrombosis of internal jugular veins     Asthma     Avascular necrosis of femur     Depression     History of pulmonary embolism     Hypertension     Morbid obesity     Opioid dependence 2017    Pneumonia due to Streptococcus pneumoniae 2017    Right lower lobe pneumonia 2017    Sepsis due to Streptococcus pneumoniae 2017    Sickle cell-beta thalassemia disease with pain     Trigeminal neuralgia        Past Surgical History:   Procedure Laterality Date     SECTION      ESOPHAGOGASTRODUODENOSCOPY N/A 2024    Procedure: EGD (ESOPHAGOGASTRODUODENOSCOPY);  Surgeon: Allen Marshall MD;  Location: [facility] ([address] [place] [address]);  Service: Gastroenterology;  Laterality: N/A;    TONSILLECTOMY      TUBAL LIGATION         Review of patient's allergies indicates:   Allergen Reactions    Cat dander Anaphylaxis, Itching and Shortness Of Breath    Nsaids (non-steroidal anti-inflammatory drug) Anaphylaxis, Itching and Shortness Of Breath    Latex Rash       No current facility-administered medications on file prior to encounter.     Current Outpatient Medications on File Prior to Encounter   Medication Sig    amLODIPine (NORVASC) 10 MG tablet Take 1 tablet (10 mg total) by mouth once daily.    ascorbic acid (VITAMIN C ORAL) Take 1 capsule by mouth Daily.    dicyclomine (BENTYL) 10 MG capsule Take 10 mg by mouth daily as needed.    FLUoxetine 40 MG capsule Take 2 capsules (80 mg total) by mouth once daily.    fluticasone propionate (FLONASE) 50 mcg/actuation nasal spray 1 spray (50 mcg total) by Each Nostril route daily as needed for Allergies.    folic acid (FOLVITE) 1 MG tablet Take 1 tablet (1 mg total) by mouth once daily.    gabapentin (NEURONTIN) 300 MG capsule Take 2-3 capsules (600-900 mg total) by mouth 3 (three) times daily.    HYDROcodone-acetaminophen (NORCO)  mg per tablet Take 1 tablet by mouth every 6 (six) hours as needed for Pain.    morphine (MS CONTIN) 30 MG 12 hr tablet Take 1  tablet (30 mg total) by mouth every 12 (twelve) hours.    pantoprazole (PROTONIX) 40 MG tablet Take 1 tablet (40 mg total) by mouth once daily.    prazosin (MINIPRESS) 1 MG Cap Take 1 capsule (1 mg total) by mouth every evening.    senna-docusate 8.6-50 mg (PERICOLACE) 8.6-50 mg per tablet Take 1 tablet by mouth daily as needed for Constipation.    warfarin (COUMADIN) 1 MG tablet Take 3 tablets (3 mg total) by mouth Daily.    acetaminophen (TYLENOL) 325 MG tablet Take 2 tablets (650 mg total) by mouth every 8 (eight) hours as needed for Pain.    albuterol (VENTOLIN HFA) 90 mcg/actuation inhaler Inhale 2 puffs into the lungs every 6 (six) hours as needed for Wheezing or Shortness of Breath. Rescue    naloxone (NARCAN) 4 mg/actuation Spry 4mg by nasal route as needed for opioid overdose; may repeat every 2-3 minutes in alternating nostrils until medical help arrives. Call 911    warfarin (COUMADIN) 2.5 MG tablet Take 1 tablet (2.5 mg total) by mouth Daily.     Family History       Problem Relation (Age of Onset)    Diabetes Mother    Heart disease Mother, Father          Tobacco Use    Smoking status: Never    Smokeless tobacco: Never   Vaping Use    Vaping status: Never Used   Substance and Sexual Activity    Alcohol use: No    Drug use: Yes     Types: Marijuana     Comment: periodically     Sexual activity: Not Currently     Birth control/protection: See Surgical Hx     Review of Systems   Constitutional:  Negative for activity change, appetite change, chills and fever.   HENT:  Negative for congestion, sore throat and trouble swallowing.    Eyes:  Negative for photophobia and visual disturbance.   Respiratory:  Negative for cough, chest tightness and shortness of breath.    Cardiovascular:  Negative for chest pain, palpitations and leg swelling.   Gastrointestinal:  Negative for abdominal pain, diarrhea and nausea.   Genitourinary:  Negative for dysuria, flank pain and hematuria.   Musculoskeletal:  Positive for  arthralgias and myalgias. Negative for back pain.   Neurological:  Negative for dizziness, weakness and headaches.   Psychiatric/Behavioral:  Negative for confusion.      Objective:     Vital Signs (Most Recent):  Temp: 99.3 °F (37.4 °C) (06/15/25 1251)  Pulse: 96 (06/15/25 1700)  Resp: 18 (06/15/25 1759)  BP: (!) 127/59 (06/15/25 1700)  SpO2: 98 % (06/15/25 1700) Vital Signs (24h Range):  Temp:  [99.3 °F (37.4 °C)] 99.3 °F (37.4 °C)  Pulse:  [] 96  Resp:  [16-18] 18  SpO2:  [97 %-100 %] 98 %  BP: (101-179)/() 127/59     Weight: 99.8 kg (220 lb)  Body mass index is 40.24 kg/m².     Physical Exam  Vitals reviewed.   Constitutional:       Appearance: Normal appearance. She is normal weight.   HENT:      Head: Normocephalic.      Mouth/Throat:      Mouth: Mucous membranes are moist.      Pharynx: Oropharynx is clear.   Eyes:      General: Lids are normal. Gaze aligned appropriately.      Conjunctiva/sclera: Conjunctivae normal.   Cardiovascular:      Rate and Rhythm: Normal rate and regular rhythm.      Pulses: Normal pulses.   Pulmonary:      Effort: Pulmonary effort is normal.      Breath sounds: Normal breath sounds.   Abdominal:      General: Bowel sounds are normal.      Palpations: Abdomen is soft.   Musculoskeletal:         General: Normal range of motion.      Cervical back: Normal range of motion.      Right hip: Tenderness present.      Left hip: Tenderness present.   Skin:     General: Skin is warm and dry.   Neurological:      Mental Status: She is alert and oriented to person, place, and time. Mental status is at baseline.   Psychiatric:         Mood and Affect: Mood normal.                Significant Labs: All pertinent labs within the past 24 hours have been reviewed.  CBC:   Recent Labs   Lab 06/15/25  1318   WBC 8.71   HGB 9.2*   HCT 28.3*        CMP:   Recent Labs   Lab 06/15/25  1318   *   K 3.8   *   CO2 23   *   BUN 17   CREATININE 1.2   CALCIUM 9.1   PROT 6.9    ALBUMIN 3.2*   BILITOT 0.3   ALKPHOS 66   AST 20   ALT 16   ANIONGAP 11       Significant Imaging: I have reviewed all pertinent imaging results/findings within the past 24 hours.  Imaging Results              X-Ray Hip 2 or 3 views Left with Pelvis when performed (Final result)  Result time 06/15/25 17:30:15      Final result by Clarissa Antonio MD (06/15/25 17:30:15)                   Impression:      No acute displaced fracture seen.      Electronically signed by: Clarissa Antonio MD  Date:    06/15/2025  Time:    17:30               Narrative:    EXAMINATION:  XR HIP WITH PELVIS WHEN PERFORMED 2 OR 3 VIEWS LEFT    CLINICAL HISTORY:  Hip pain;    TECHNIQUE:  AP view of the pelvis and frog leg lateral view of the left hip were performed.    COMPARISON:  03/25/2025.    FINDINGS:  No evidence of acute displaced fracture, dislocation, or osseous destructive process.  Hip joint spaces appear fairly well maintained.                                       X-Ray Chest AP Portable (Final result)  Result time 06/15/25 13:45:21      Final result by Pee Decker MD (06/15/25 13:45:21)                   Impression:      As above      Electronically signed by: Pee Decker MD  Date:    06/15/2025  Time:    13:45               Narrative:    EXAMINATION:  XR CHEST AP PORTABLE    CLINICAL HISTORY:  sickle cell pain;    TECHNIQUE:  Frontal view of the chest was performed.    COMPARISON:  05/28/2025    FINDINGS:  Right chest port in stable position.  The cardiomediastinal silhouette is upper normal in size, similar to prior.    The lungs appear symmetrically expanded with few coarse interstitial markings.  No new confluent pulmonary parenchymal opacity. No pleural fluid or pneumothorax.                                      Assessment/Plan:     Assessment & Plan  Vaso-occlusive pain due to sickle cell disease  Hx Avascular Necrosis    Patient presenting with bilateral hip pain consistent with sickle cell.  She reports  worsening hip pain and is requesting hip x-ray.  Denies any falls or trauma.  Patient was recently admitted to [facility] for acute encephalopathy and sickle cell related pain and left AMA on 6/11/25.  She is followed by Dr. Soni outpatient.  Patient has received exchange transfusions in the past and per chart review used to get them regularly when she lived in [place].  Her most recent transfusion was performed 05/23/2025.  Reticulocytes 4.1% and hemoglobin 9.2    - Continue adjunct pain control with acetaminophen 1000mg PO q8hr, gabapentin at 900mg PO q8hr.  - Continue morphine 30mg PO q8hr, oxycodone 10mg PO q4hr PRN, hydromorphone at 2mg IV q3hr PRN, diphenhydramine 25mg IV q3hr PRN. Tizanidine 4mg PRN  - Continue IVFs with LR.  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  History of PE x 2 (2020 and 2023 requiring thrombectomy) and chronic bilateral IJ thrombosis    -continue home warfarin based on INR  -PT/INR daily  Asthma  Noted, no wheezing or signs of acute asthma exacerbation    PRN duo nebs    VTE Risk Mitigation (From admission, onward)           Ordered     warfarin tablet 3 mg  Daily         06/15/25 1755     IP VTE HIGH RISK PATIENT  Once         06/15/25 1636     Place sequential compression device  Until discontinued         06/15/25 1636     Reason for No Pharmacological VTE Prophylaxis  Once        Question:  Reasons:  Answer:  Already adequately anticoagulated on oral  Anticoagulants    06/15/25 1636                              Sara Bryant NP  Department of Hospital Medicine  [facility] ([address])

## 2025-06-15 NOTE — HPI
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 She states she has been taking her prescribed medications with minimal relief and her pain worsened last night prompting her to come to ED.  Patient states she was admitted at [facility] for encephalopathy but left against medical advice due to not liking in the way she was treated.  Patient denies chest pain, vomiting fever and chills. She reports shortness of breath that occurs secondary to pain.  She is followed by Dr Soni, Hem/Onc.     ED work up significant for hemoglobin 9.2, sodium 147, reticulocytes 4.1%. Chest xray showed symmetrically expanded lungs with few coarse interstitial markings. Patient received 1L IV NS and IV dilaudid in the ED with minimal improvement. She was referred to hospital medicine and will be admitted for further evaluation and management.

## 2025-06-15 NOTE — SUBJECTIVE & OBJECTIVE
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    Acute venous embolism and thrombosis of internal jugular veins     Asthma     Avascular necrosis of femur     Depression     History of pulmonary embolism     Hypertension     Morbid obesity     Opioid dependence 2017    Pneumonia due to Streptococcus pneumoniae 2017    Right lower lobe pneumonia 2017    Sepsis due to Streptococcus pneumoniae 2017    Sickle cell-beta thalassemia disease with pain     Trigeminal neuralgia        Past Surgical History:   Procedure Laterality Date     SECTION      ESOPHAGOGASTRODUODENOSCOPY N/A 2024    Procedure: EGD (ESOPHAGOGASTRODUODENOSCOPY);  Surgeon: Allen Marshall MD;  Location: [address]);  Service: Gastroenterology;  Laterality: N/A;    TONSILLECTOMY      TUBAL LIGATION         Review of patient's allergies indicates:   Allergen Reactions    Cat dander Anaphylaxis, Itching and Shortness Of Breath    Nsaids (non-steroidal anti-inflammatory drug) Anaphylaxis, Itching and Shortness Of Breath    Latex Rash       No current facility-administered medications on file prior to encounter.     Current Outpatient Medications on File Prior to Encounter   Medication Sig    amLODIPine (NORVASC) 10 MG tablet Take 1 tablet (10 mg total) by mouth once daily.    ascorbic acid (VITAMIN C ORAL) Take 1 capsule by mouth Daily.    dicyclomine (BENTYL) 10 MG capsule Take 10 mg by mouth daily as needed.    FLUoxetine 40 MG capsule Take 2 capsules (80 mg total) by mouth once daily.    fluticasone propionate (FLONASE) 50 mcg/actuation nasal spray 1 spray (50 mcg total) by Each Nostril route daily as needed for Allergies.    folic acid (FOLVITE) 1 MG tablet Take 1 tablet (1 mg total) by mouth once daily.    gabapentin (NEURONTIN) 300 MG capsule Take 2-3 capsules (600-900 mg total) by mouth 3 (three) times  daily.    HYDROcodone-acetaminophen (NORCO)  mg per tablet Take 1 tablet by mouth every 6 (six) hours as needed for Pain.    morphine (MS CONTIN) 30 MG 12 hr tablet Take 1 tablet (30 mg total) by mouth every 12 (twelve) hours.    pantoprazole (PROTONIX) 40 MG tablet Take 1 tablet (40 mg total) by mouth once daily.    prazosin (MINIPRESS) 1 MG Cap Take 1 capsule (1 mg total) by mouth every evening.    senna-docusate 8.6-50 mg (PERICOLACE) 8.6-50 mg per tablet Take 1 tablet by mouth daily as needed for Constipation.    warfarin (COUMADIN) 1 MG tablet Take 3 tablets (3 mg total) by mouth Daily.    acetaminophen (TYLENOL) 325 MG tablet Take 2 tablets (650 mg total) by mouth every 8 (eight) hours as needed for Pain.    albuterol (VENTOLIN HFA) 90 mcg/actuation inhaler Inhale 2 puffs into the lungs every 6 (six) hours as needed for Wheezing or Shortness of Breath. Rescue    naloxone (NARCAN) 4 mg/actuation Spry 4mg by nasal route as needed for opioid overdose; may repeat every 2-3 minutes in alternating nostrils until medical help arrives. Call 911    warfarin (COUMADIN) 2.5 MG tablet Take 1 tablet (2.5 mg total) by mouth Daily.     Family History       Problem Relation (Age of Onset)    Diabetes Mother    Heart disease Mother, Father          Tobacco Use    Smoking status: Never    Smokeless tobacco: Never   Vaping Use    Vaping status: Never Used   Substance and Sexual Activity    Alcohol use: No    Drug use: Yes     Types: Marijuana     Comment: periodically     Sexual activity: Not Currently     Birth control/protection: See Surgical Hx     Review of Systems   Constitutional:  Negative for activity change, appetite change, chills and fever.   HENT:  Negative for congestion, sore throat and trouble swallowing.    Eyes:  Negative for photophobia and visual disturbance.   Respiratory:  Negative for cough, chest tightness and shortness of breath.    Cardiovascular:  Negative for chest pain, palpitations and leg  swelling.   Gastrointestinal:  Negative for abdominal pain, diarrhea and nausea.   Genitourinary:  Negative for dysuria, flank pain and hematuria.   Musculoskeletal:  Positive for arthralgias and myalgias. Negative for back pain.   Neurological:  Negative for dizziness, weakness and headaches.   Psychiatric/Behavioral:  Negative for confusion.      Objective:     Vital Signs (Most Recent):  Temp: 99.3 °F (37.4 °C) (06/15/25 1251)  Pulse: 96 (06/15/25 1700)  Resp: 18 (06/15/25 1759)  BP: (!) 127/59 (06/15/25 1700)  SpO2: 98 % (06/15/25 1700) Vital Signs (24h Range):  Temp:  [99.3 °F (37.4 °C)] 99.3 °F (37.4 °C)  Pulse:  [] 96  Resp:  [16-18] 18  SpO2:  [97 %-100 %] 98 %  BP: (101-179)/() 127/59     Weight: 99.8 kg (220 lb)  Body mass index is 40.24 kg/m².     Physical Exam  Vitals reviewed.   Constitutional:       Appearance: Normal appearance. She is normal weight.   HENT:      Head: Normocephalic.      Mouth/Throat:      Mouth: Mucous membranes are moist.      Pharynx: Oropharynx is clear.   Eyes:      General: Lids are normal. Gaze aligned appropriately.      Conjunctiva/sclera: Conjunctivae normal.   Cardiovascular:      Rate and Rhythm: Normal rate and regular rhythm.      Pulses: Normal pulses.   Pulmonary:      Effort: Pulmonary effort is normal.      Breath sounds: Normal breath sounds.   Abdominal:      General: Bowel sounds are normal.      Palpations: Abdomen is soft.   Musculoskeletal:         General: Normal range of motion.      Cervical back: Normal range of motion.      Right hip: Tenderness present.      Left hip: Tenderness present.   Skin:     General: Skin is warm and dry.   Neurological:      Mental Status: She is alert and oriented to person, place, and time. Mental status is at baseline.   Psychiatric:         Mood and Affect: Mood normal.                Significant Labs: All pertinent labs within the past 24 hours have been reviewed.  CBC:   Recent Labs   Lab 06/15/25  1318   WBC  8.71   HGB 9.2*   HCT 28.3*        CMP:   Recent Labs   Lab 06/15/25  1318   *   K 3.8   *   CO2 23   *   BUN 17   CREATININE 1.2   CALCIUM 9.1   PROT 6.9   ALBUMIN 3.2*   BILITOT 0.3   ALKPHOS 66   AST 20   ALT 16   ANIONGAP 11       Significant Imaging: I have reviewed all pertinent imaging results/findings within the past 24 hours.  Imaging Results              X-Ray Hip 2 or 3 views Left with Pelvis when performed (Final result)  Result time 06/15/25 17:30:15      Final result by Clarissa Antonio MD (06/15/25 17:30:15)                   Impression:      No acute displaced fracture seen.      Electronically signed by: Clarissa Antonio MD  Date:    06/15/2025  Time:    17:30               Narrative:    EXAMINATION:  XR HIP WITH PELVIS WHEN PERFORMED 2 OR 3 VIEWS LEFT    CLINICAL HISTORY:  Hip pain;    TECHNIQUE:  AP view of the pelvis and frog leg lateral view of the left hip were performed.    COMPARISON:  03/25/2025.    FINDINGS:  No evidence of acute displaced fracture, dislocation, or osseous destructive process.  Hip joint spaces appear fairly well maintained.                                       X-Ray Chest AP Portable (Final result)  Result time 06/15/25 13:45:21      Final result by Pee Decker MD (06/15/25 13:45:21)                   Impression:      As above      Electronically signed by: Pee Decker MD  Date:    06/15/2025  Time:    13:45               Narrative:    EXAMINATION:  XR CHEST AP PORTABLE    CLINICAL HISTORY:  sickle cell pain;    TECHNIQUE:  Frontal view of the chest was performed.    COMPARISON:  05/28/2025    FINDINGS:  Right chest port in stable position.  The cardiomediastinal silhouette is upper normal in size, similar to prior.    The lungs appear symmetrically expanded with few coarse interstitial markings.  No new confluent pulmonary parenchymal opacity. No pleural fluid or pneumothorax.

## 2025-06-15 NOTE — ASSESSMENT & PLAN NOTE
Hx Avascular Necrosis    Patient presenting with bilateral hip pain consistent with sickle cell.  She reports worsening hip pain and is requesting hip x-ray.  Denies any falls or trauma.  Patient was recently admitted to [facility] for acute encephalopathy and sickle cell related pain and left AMA on 6/11/25.  She is followed by Dr. Soni outpatient.  Patient has received exchange transfusions in the past and per chart review used to get them regularly when she lived in [place].  Her most recent transfusion was performed 05/23/2025.  Reticulocytes 4.1% and hemoglobin 9.2    - Continue adjunct pain control with acetaminophen 1000mg PO q8hr, gabapentin at 900mg PO q8hr.  - Continue morphine 30mg PO q8hr, oxycodone 10mg PO q4hr PRN, hydromorphone at 2mg IV q3hr PRN, diphenhydramine 25mg IV q3hr PRN. Tizanidine 4mg PRN  - Continue IVFs with LR.  - Monitor CBC   - continue folic acid  - Supplemental oxygen

## 2025-06-16 PROBLEM — B37.31 VAGINAL YEAST INFECTION: Status: ACTIVE | Noted: 2025-06-16

## 2025-06-16 LAB
ABSOLUTE EOSINOPHIL (OHS): 0.33 K/UL
ABSOLUTE MONOCYTE (OHS): 1.2 K/UL (ref 0.3–1)
ABSOLUTE NEUTROPHIL COUNT (OHS): 3.3 K/UL (ref 1.8–7.7)
ALBUMIN SERPL BCP-MCNC: 2.9 G/DL (ref 3.5–5.2)
ALP SERPL-CCNC: 59 UNIT/L (ref 40–150)
ALT SERPL W/O P-5'-P-CCNC: 13 UNIT/L (ref 10–44)
ANION GAP (OHS): 7 MMOL/L (ref 8–16)
AST SERPL-CCNC: 18 UNIT/L (ref 11–45)
BASOPHILS # BLD AUTO: 0.04 K/UL
BASOPHILS NFR BLD AUTO: 0.4 %
BILIRUB SERPL-MCNC: 0.3 MG/DL (ref 0.1–1)
BUN SERPL-MCNC: 12 MG/DL (ref 6–20)
CALCIUM SERPL-MCNC: 8.6 MG/DL (ref 8.7–10.5)
CHLORIDE SERPL-SCNC: 111 MMOL/L (ref 95–110)
CO2 SERPL-SCNC: 26 MMOL/L (ref 23–29)
CREAT SERPL-MCNC: 0.9 MG/DL (ref 0.5–1.4)
ERYTHROCYTE [DISTWIDTH] IN BLOOD BY AUTOMATED COUNT: 25.4 % (ref 11.5–14.5)
GFR SERPLBLD CREATININE-BSD FMLA CKD-EPI: >60 ML/MIN/1.73/M2
GLUCOSE SERPL-MCNC: 81 MG/DL (ref 70–110)
HCT VFR BLD AUTO: 25.6 % (ref 37–48.5)
HGB BLD-MCNC: 8.3 GM/DL (ref 12–16)
IMM GRANULOCYTES # BLD AUTO: 0.03 K/UL (ref 0–0.04)
IMM GRANULOCYTES NFR BLD AUTO: 0.3 % (ref 0–0.5)
INR PPP: 1.4 (ref 0.8–1.2)
LYMPHOCYTES # BLD AUTO: 4.24 K/UL (ref 1–4.8)
MCH RBC QN AUTO: 25.5 PG (ref 27–31)
MCHC RBC AUTO-ENTMCNC: 32.4 G/DL (ref 32–36)
MCV RBC AUTO: 79 FL (ref 82–98)
NUCLEATED RBC (/100WBC) (OHS): 2 /100 WBC
PLATELET # BLD AUTO: 320 K/UL (ref 150–450)
PMV BLD AUTO: 9.6 FL (ref 9.2–12.9)
POTASSIUM SERPL-SCNC: 3.8 MMOL/L (ref 3.5–5.1)
PROT SERPL-MCNC: 6.1 GM/DL (ref 6–8.4)
PROTHROMBIN TIME: 15 SECONDS (ref 9–12.5)
RBC # BLD AUTO: 3.26 M/UL (ref 4–5.4)
RELATIVE EOSINOPHIL (OHS): 3.6 %
RELATIVE LYMPHOCYTE (OHS): 46.4 % (ref 18–48)
RELATIVE MONOCYTE (OHS): 13.1 % (ref 4–15)
RELATIVE NEUTROPHIL (OHS): 36.2 % (ref 38–73)
SODIUM SERPL-SCNC: 144 MMOL/L (ref 136–145)
WBC # BLD AUTO: 9.14 K/UL (ref 3.9–12.7)

## 2025-06-16 PROCEDURE — 94761 N-INVAS EAR/PLS OXIMETRY MLT: CPT

## 2025-06-16 PROCEDURE — 63600175 PHARM REV CODE 636 W HCPCS: Performed by: HOSPITALIST

## 2025-06-16 PROCEDURE — 82040 ASSAY OF SERUM ALBUMIN: CPT | Performed by: NURSE PRACTITIONER

## 2025-06-16 PROCEDURE — 25000003 PHARM REV CODE 250: Performed by: HOSPITALIST

## 2025-06-16 PROCEDURE — 25000003 PHARM REV CODE 250: Performed by: NURSE PRACTITIONER

## 2025-06-16 PROCEDURE — 85025 COMPLETE CBC W/AUTO DIFF WBC: CPT | Performed by: NURSE PRACTITIONER

## 2025-06-16 PROCEDURE — 36415 COLL VENOUS BLD VENIPUNCTURE: CPT | Performed by: NURSE PRACTITIONER

## 2025-06-16 PROCEDURE — 63600175 PHARM REV CODE 636 W HCPCS: Performed by: NURSE PRACTITIONER

## 2025-06-16 PROCEDURE — 21400001 HC TELEMETRY ROOM

## 2025-06-16 PROCEDURE — 85610 PROTHROMBIN TIME: CPT | Performed by: NURSE PRACTITIONER

## 2025-06-16 RX ORDER — ASPIRIN 325 MG
1 TABLET, DELAYED RELEASE (ENTERIC COATED) ORAL NIGHTLY
Status: DISCONTINUED | OUTPATIENT
Start: 2025-06-16 | End: 2025-06-16

## 2025-06-16 RX ORDER — ASPIRIN 325 MG
1 TABLET, DELAYED RELEASE (ENTERIC COATED) ORAL DAILY
Status: DISCONTINUED | OUTPATIENT
Start: 2025-06-16 | End: 2025-06-21 | Stop reason: HOSPADM

## 2025-06-16 RX ORDER — AMOXICILLIN 250 MG
2 CAPSULE ORAL NIGHTLY
Status: DISCONTINUED | OUTPATIENT
Start: 2025-06-17 | End: 2025-06-21 | Stop reason: HOSPADM

## 2025-06-16 RX ORDER — HYDROMORPHONE HYDROCHLORIDE 2 MG/ML
3 INJECTION, SOLUTION INTRAMUSCULAR; INTRAVENOUS; SUBCUTANEOUS
Status: DISCONTINUED | OUTPATIENT
Start: 2025-06-16 | End: 2025-06-17

## 2025-06-16 RX ORDER — HYDROCODONE BITARTRATE AND ACETAMINOPHEN 10; 325 MG/1; MG/1
1 TABLET ORAL EVERY 6 HOURS PRN
Refills: 0 | Status: DISCONTINUED | OUTPATIENT
Start: 2025-06-16 | End: 2025-06-21 | Stop reason: HOSPADM

## 2025-06-16 RX ORDER — FLUCONAZOLE 150 MG/1
150 TABLET ORAL ONCE
Status: COMPLETED | OUTPATIENT
Start: 2025-06-16 | End: 2025-06-16

## 2025-06-16 RX ORDER — WARFARIN 2 MG/1
4 TABLET ORAL DAILY
Status: DISCONTINUED | OUTPATIENT
Start: 2025-06-16 | End: 2025-06-21 | Stop reason: HOSPADM

## 2025-06-16 RX ADMIN — HYDROCODONE BITARTRATE AND ACETAMINOPHEN 1 TABLET: 10; 325 TABLET ORAL at 09:06

## 2025-06-16 RX ADMIN — WARFARIN SODIUM 4 MG: 2 TABLET ORAL at 05:06

## 2025-06-16 RX ADMIN — DIPHENHYDRAMINE HYDROCHLORIDE 25 MG: 50 INJECTION, SOLUTION INTRAMUSCULAR; INTRAVENOUS at 08:06

## 2025-06-16 RX ADMIN — HYDROMORPHONE HYDROCHLORIDE 3 MG: 2 INJECTION INTRAMUSCULAR; INTRAVENOUS; SUBCUTANEOUS at 05:06

## 2025-06-16 RX ADMIN — OXYCODONE HYDROCHLORIDE AND ACETAMINOPHEN 1000 MG: 500 TABLET ORAL at 08:06

## 2025-06-16 RX ADMIN — FLUOXETINE HYDROCHLORIDE 80 MG: 20 CAPSULE ORAL at 08:06

## 2025-06-16 RX ADMIN — TIZANIDINE 4 MG: 4 TABLET ORAL at 11:06

## 2025-06-16 RX ADMIN — DIPHENHYDRAMINE HYDROCHLORIDE 25 MG: 50 INJECTION, SOLUTION INTRAMUSCULAR; INTRAVENOUS at 05:06

## 2025-06-16 RX ADMIN — SENNOSIDES, DOCUSATE SODIUM 1 TABLET: 50; 8.6 TABLET, FILM COATED ORAL at 08:06

## 2025-06-16 RX ADMIN — HYDROMORPHONE HYDROCHLORIDE 3 MG: 2 INJECTION INTRAMUSCULAR; INTRAVENOUS; SUBCUTANEOUS at 11:06

## 2025-06-16 RX ADMIN — GABAPENTIN 900 MG: 300 CAPSULE ORAL at 08:06

## 2025-06-16 RX ADMIN — HYDROCODONE BITARTRATE AND ACETAMINOPHEN 1 TABLET: 10; 325 TABLET ORAL at 10:06

## 2025-06-16 RX ADMIN — DIPHENHYDRAMINE HYDROCHLORIDE 25 MG: 50 INJECTION, SOLUTION INTRAMUSCULAR; INTRAVENOUS at 03:06

## 2025-06-16 RX ADMIN — AMLODIPINE BESYLATE 10 MG: 5 TABLET ORAL at 08:06

## 2025-06-16 RX ADMIN — HYDROMORPHONE HYDROCHLORIDE 2 MG: 2 INJECTION INTRAMUSCULAR; INTRAVENOUS; SUBCUTANEOUS at 05:06

## 2025-06-16 RX ADMIN — HYDROMORPHONE HYDROCHLORIDE 3 MG: 2 INJECTION INTRAMUSCULAR; INTRAVENOUS; SUBCUTANEOUS at 09:06

## 2025-06-16 RX ADMIN — LACTULOSE 15 G: 20 SOLUTION ORAL at 08:06

## 2025-06-16 RX ADMIN — TIZANIDINE 4 MG: 4 TABLET ORAL at 08:06

## 2025-06-16 RX ADMIN — CLOTRIMAZOLE 1 APPLICATOR: 10 CREAM VAGINAL at 01:06

## 2025-06-16 RX ADMIN — LACTULOSE 15 G: 20 SOLUTION ORAL at 11:06

## 2025-06-16 RX ADMIN — HYDROCODONE BITARTRATE AND ACETAMINOPHEN 1 TABLET: 10; 325 TABLET ORAL at 04:06

## 2025-06-16 RX ADMIN — FLUCONAZOLE 150 MG: 150 TABLET ORAL at 12:06

## 2025-06-16 RX ADMIN — GABAPENTIN 900 MG: 300 CAPSULE ORAL at 03:06

## 2025-06-16 RX ADMIN — HYDROMORPHONE HYDROCHLORIDE 3 MG: 2 INJECTION INTRAMUSCULAR; INTRAVENOUS; SUBCUTANEOUS at 08:06

## 2025-06-16 RX ADMIN — SODIUM CHLORIDE, POTASSIUM CHLORIDE, SODIUM LACTATE AND CALCIUM CHLORIDE: 600; 310; 30; 20 INJECTION, SOLUTION INTRAVENOUS at 05:06

## 2025-06-16 RX ADMIN — HYDROMORPHONE HYDROCHLORIDE 3 MG: 2 INJECTION INTRAMUSCULAR; INTRAVENOUS; SUBCUTANEOUS at 03:06

## 2025-06-16 RX ADMIN — PANTOPRAZOLE SODIUM 40 MG: 40 TABLET, DELAYED RELEASE ORAL at 08:06

## 2025-06-16 RX ADMIN — DIPHENHYDRAMINE HYDROCHLORIDE 25 MG: 50 INJECTION, SOLUTION INTRAMUSCULAR; INTRAVENOUS at 12:06

## 2025-06-16 RX ADMIN — HYDROMORPHONE HYDROCHLORIDE 2 MG: 2 INJECTION INTRAMUSCULAR; INTRAVENOUS; SUBCUTANEOUS at 03:06

## 2025-06-16 RX ADMIN — DIPHENHYDRAMINE HYDROCHLORIDE 25 MG: 50 INJECTION, SOLUTION INTRAMUSCULAR; INTRAVENOUS at 11:06

## 2025-06-16 RX ADMIN — HYDROMORPHONE HYDROCHLORIDE 2 MG: 2 INJECTION INTRAMUSCULAR; INTRAVENOUS; SUBCUTANEOUS at 12:06

## 2025-06-16 RX ADMIN — ACETAMINOPHEN 1000 MG: 500 TABLET, FILM COATED ORAL at 01:06

## 2025-06-16 RX ADMIN — DIPHENHYDRAMINE HYDROCHLORIDE 25 MG: 50 INJECTION, SOLUTION INTRAMUSCULAR; INTRAVENOUS at 09:06

## 2025-06-16 RX ADMIN — MORPHINE SULFATE 30 MG: 15 TABLET, FILM COATED, EXTENDED RELEASE ORAL at 08:06

## 2025-06-16 RX ADMIN — FOLIC ACID 1 MG: 1 TABLET ORAL at 08:06

## 2025-06-16 RX ADMIN — TIZANIDINE 4 MG: 4 TABLET ORAL at 05:06

## 2025-06-16 RX ADMIN — MUPIROCIN: 20 OINTMENT TOPICAL at 09:06

## 2025-06-16 RX ADMIN — HYDROMORPHONE HYDROCHLORIDE 3 MG: 2 INJECTION INTRAMUSCULAR; INTRAVENOUS; SUBCUTANEOUS at 12:06

## 2025-06-16 RX ADMIN — PRAZOSIN HYDROCHLORIDE 1 MG: 1 CAPSULE ORAL at 08:06

## 2025-06-16 NOTE — HOSPITAL COURSE
Group Topic: BH Check-in/Symptom Rating    Date: 5/16/2021  Start Time: 0930  End Time: 1030  Facilitators: ЮЛИЯ Downs    Focus: Goals  Number in attendance: 10    Method: Group  Attendance: Present  Participation: Active  Patient Response: Attentive  Mood: Anxious  Affect: Type: Anxious   Range: Full (normal)   Congruency: Congruent   Stability: Stable  Behavior/Socialization: Engaged  Thought Process: Flight of ideas  Task Performance: Follows directions  Patient Evaluation: Independent - full participation   Started on IV fluid, pain medication with Benadryl, treating sickle cell pain crisis.  She is continued on folic acid.  She reports vaginal yeast infection, has received Diflucan orally, and started topical tx. Symptoms improving. INR daily as she is on Coumadin. 6/19 - stop fluids due to bloating. Left axilla round lesion - US. Discussed with radiologist on call - edema within fat tissues, no intervention needed. PT/OT - . Pain improved and stable for discharge.

## 2025-06-16 NOTE — ASSESSMENT & PLAN NOTE
Hx Avascular Necrosis    Patient presenting with bilateral hip pain consistent with sickle cell.  She reports worsening hip pain and is requesting hip x-ray.  Denies any falls or trauma.  Patient was recently admitted to [facility] for acute encephalopathy and sickle cell related pain and left AMA on 6/11/25.  She is followed by Dr. Soni outpatient.  Patient has received exchange transfusions in the past and per chart review used to get them regularly when she lived in [place].  Her most recent transfusion was performed 05/23/2025.  Reticulocytes 4.1% and hemoglobin 9.2    - Continue adjunct pain control with acetaminophen 1000mg PO q8hr, gabapentin at 900mg PO q8hr.  - Continue morphine 30mg PO q8hr, oxycodone 10mg PO q4hr PRN, hydromorphone at 3mg IV q3hr PRN, diphenhydramine 25mg IV q3hr PRN. Tizanidine 4mg PRN  - Continue IVFs with LR.  - Monitor CBC   - continue folic acid  - Supplemental oxygen

## 2025-06-16 NOTE — PLAN OF CARE
06/16/25 1405   Readmission   Was this a planned readmission? No   Why were you hospitalized in the last 30 days? Abnormal EKG   Why were you readmitted? New medical problem   When you left the hospital how did you feel? still felt bad   When you left the hospital where did you go? Home with Family   Did patient/caregiver refused recommended DC plan? No

## 2025-06-16 NOTE — PROGRESS NOTES
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 She states she has been taking her prescribed medications with minimal relief and her pain worsened last night prompting her to come to ED.  Patient states she was admitted at [facility] for encephalopathy but left against medical advice due to not liking in the way she was treated.  Patient denies chest pain, vomiting fever and chills. She reports shortness of breath that occurs secondary to pain.  She is followed by Dr Soni, Hem/Onc.     ED work up significant for hemoglobin 9.2, sodium 147, reticulocytes 4.1%. Chest xray showed symmetrically expanded lungs with few coarse interstitial markings. Patient received 1L IV NS and IV dilaudid in the ED with minimal improvement. She was referred to hospital medicine and will be admitted for further evaluation and management.     Overview/Hospital Course:  Started on IV fluid, pain medication with Benadryl, treating sickle cell pain crisis.  She is continued on folic acid.  She reports vaginal yeast infection, has received Diflucan orally, and started topical clotrimazole.  INR daily as she is on Coumadin.    Interval History:  Patient new to me.  She came in for sickle cell pain and requested increase in her pain medication dose.  Also reports vaginal  itching that is currently intense and requests antifungal medication for treatment.  Discussed with RN.    Review of Systems   Constitutional:  Negative for diaphoresis and fever.   Respiratory:  Negative for cough and shortness of breath.    Cardiovascular:  Negative for chest pain and palpitations.   Gastrointestinal:  Negative for abdominal pain and nausea.   Genitourinary:  Negative for dysuria.        Vaginal itching   Musculoskeletal:  Negative for gait problem.   Skin:  Negative for color change.   Neurological:  Negative for headaches.   Psychiatric/Behavioral:  Negative for agitation.      Objective:     Vital Signs (Most Recent):  Temp: 98.3 °F (36.8 °C) (06/16/25 1152)  Pulse: 75 (06/16/25 1152)  Resp: 16 (06/16/25 1204)  BP: 133/75 (06/16/25 1152)  SpO2: 98 % (06/16/25 1152) Vital Signs (24h Range):  Temp:  [97.7 °F (36.5 °C)-99.3 °F (37.4 °C)] 98.3 °F (36.8 °C)  Pulse:  [] 75  Resp:  [15-18] 16  SpO2:  [94 %-100 %] 98 %  BP: (101-179)/() 133/75     Weight: 99.7 kg (219 lb 12.8 oz)  Body mass index is 40.2 kg/m².    Intake/Output Summary (Last 24 hours) at 6/16/2025 1248  Last data filed at 6/16/2025 0821  Gross per 24 hour   Intake 610 ml   Output 2500 ml   Net -1890 ml         Physical Exam  Constitutional:       General: She is not in acute distress.     Appearance: Normal appearance. She is well-developed. She is obese.   HENT:      Head: Normocephalic and atraumatic.   Eyes:      Conjunctiva/sclera: Conjunctivae normal.      Pupils: Pupils are equal, round, and reactive to light.   Cardiovascular:      Rate and Rhythm: Normal rate and regular rhythm.      Pulses: Normal pulses.   Pulmonary:      Effort: Pulmonary effort is normal.      Breath sounds: Normal breath sounds.   Abdominal:      General: Bowel sounds are normal.      Palpations: Abdomen is soft.      Tenderness: There is no abdominal tenderness.   Musculoskeletal:      Cervical back: Normal range of motion.      Right lower  leg: Edema present.      Left lower leg: Edema present.      Comments: Trace bilateral lower extremity edema.  Has long scar right lateral calf area.   Skin:     General: Skin is warm and dry.   Neurological:      Mental Status: She is alert and oriented to person, place, and time.           Significant Labs: All pertinent labs within the past 24 hours have been reviewed.  CBC:   Recent Labs   Lab 06/15/25  1318 06/16/25  0434   WBC 8.71 9.14   HGB 9.2* 8.3*   HCT 28.3* 25.6*    320     CMP:   Recent Labs   Lab 06/15/25  1318 06/16/25  0434   * 144   K 3.8 3.8   * 111*   CO2 23 26   * 81   BUN 17 12   CREATININE 1.2 0.9   CALCIUM 9.1 8.6*   PROT 6.9 6.1   ALBUMIN 3.2* 2.9*   BILITOT 0.3 0.3   ALKPHOS 66 59   AST 20 18   ALT 16 13   ANIONGAP 11 7*     Coagulation:   Recent Labs   Lab 06/16/25  0434   INR 1.4*       Significant Imaging: I have reviewed all pertinent imaging results/findings within the past 24 hours.    Imaging Results              X-Ray Hip 2 or 3 views Left with Pelvis when performed (Final result)  Result time 06/15/25 17:30:15      Final result by Clarissa Antonio MD (06/15/25 17:30:15)                   Impression:      No acute displaced fracture seen.      Electronically signed by: Clarissa Antonio MD  Date:    06/15/2025  Time:    17:30               Narrative:    EXAMINATION:  XR HIP WITH PELVIS WHEN PERFORMED 2 OR 3 VIEWS LEFT    CLINICAL HISTORY:  Hip pain;    TECHNIQUE:  AP view of the pelvis and frog leg lateral view of the left hip were performed.    COMPARISON:  03/25/2025.    FINDINGS:  No evidence of acute displaced fracture, dislocation, or osseous destructive process.  Hip joint spaces appear fairly well maintained.                                       X-Ray Chest AP Portable (Final result)  Result time 06/15/25 13:45:21      Final result by Pee Decker MD (06/15/25 13:45:21)                   Impression:      As above      Electronically signed  by: Pee Decker MD  Date:    06/15/2025  Time:    13:45               Narrative:    EXAMINATION:  XR CHEST AP PORTABLE    CLINICAL HISTORY:  sickle cell pain;    TECHNIQUE:  Frontal view of the chest was performed.    COMPARISON:  05/28/2025    FINDINGS:  Right chest port in stable position.  The cardiomediastinal silhouette is upper normal in size, similar to prior.    The lungs appear symmetrically expanded with few coarse interstitial markings.  No new confluent pulmonary parenchymal opacity. No pleural fluid or pneumothorax.                                         Assessment & Plan  Vaso-occlusive pain due to sickle cell disease  Hx Avascular Necrosis    Patient presenting with bilateral hip pain consistent with sickle cell.  She reports worsening hip pain and is requesting hip x-ray.  Denies any falls or trauma.  Patient was recently admitted to [facility] for acute encephalopathy and sickle cell related pain and left AMA on 6/11/25.  She is followed by Dr. Soni outpatient.  Patient has received exchange transfusions in the past and per chart review used to get them regularly when she lived in [place].  Her most recent transfusion was performed 05/23/2025.  Reticulocytes 4.1% and hemoglobin 9.2    - Continue adjunct pain control with acetaminophen 1000mg PO q8hr, gabapentin at 900mg PO q8hr.  - Continue morphine 30mg PO q8hr, oxycodone 10mg PO q4hr PRN, hydromorphone at 3mg IV q3hr PRN, diphenhydramine 25mg IV q3hr PRN. Tizanidine 4mg PRN  - Continue IVFs with LR.  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  - Diflucan 150 mg p.o. once.  - topical clotrimazole per patient request.  - monitor.      VTE Risk Mitigation (From admission, onward)           Ordered     warfarin (COUMADIN) tablet 4 mg  Daily         06/16/25 1253     IP VTE HIGH RISK PATIENT  Once         06/15/25 1636     Place sequential compression device  Until discontinued         06/15/25 1636     Reason for No Pharmacological VTE Prophylaxis  Once        Question:  Reasons:  Answer:  Already adequately anticoagulated on oral Anticoagulants    06/15/25 1636                    Discharge Planning   ALYSE:      Code Status: Full Code   Medical Readiness for Discharge Date:                            Glynn Palomares MD  Department of Hospital Medicine   [facility] ([address])

## 2025-06-16 NOTE — PLAN OF CARE
Case Management Assessment     PCP: Kezia PCP  Pharmacy: [facility]    Patient Arrived From: home   Existing Help at Home: lives with family, [facility]     Barriers to Discharge: none    Discharge Plan:    A. Home with family and HH   B. Home with family      Assessment completed with patient at bedside---patient alert and oriented. Patient lives with family, independent in ADLs, has no DME/HD, current with [facility] . Patient will need a ride home at discharge.        [facility] ([address])  Initial Discharge Assessment       Primary Care Provider: Kezia, Primary Doctor    Admission Diagnosis: Sickle cell pain crisis [D57.00]  Chest pain [R07.9]    Admission Date: 6/15/2025  Expected Discharge Date:     Transition of Care Barriers: None    Payor: [employer] MANAGED MEDICARE / Plan: [employer] MEDICARE PLAN HMO / Product Type: Medicare Advantage /     Extended Emergency Contact Information  Primary Emergency Contact: Timoteo Malone  Mobile Phone: [phone]  Relation: Daughter    Discharge Plan A: Home with family, Home Health  Discharge Plan B: Home with family      [facility] [address] [address]  Phone: [phone] Fax: [phone]      Initial Assessment (most recent)       Adult Discharge Assessment - 06/16/25 1409          Discharge Assessment    Assessment Type Discharge Planning Assessment     Confirmed/corrected address, phone number and insurance Yes     Confirmed Demographics Correct on Facesheet     Source of Information patient     Communicated ALYSE with patient/caregiver Date not available/Unable to determine     People in Home child(jayce), adult;parent(s);grandchild(jayce)     Name(s) of People in Home mother, 2 daughters, and grandchildren     Do you expect to return to your current living situation? Yes     Do you have help at home or someone to help you manage your care at home? No     Prior to hospitilization cognitive status: Alert/Oriented      Current cognitive status: Alert/Oriented     Walking or Climbing Stairs Difficulty no     Dressing/Bathing Difficulty no     Equipment Currently Used at Home none     Readmission within 30 days? Yes     Patient currently being followed by outpatient case management? No     Do you currently have service(s) that help you manage your care at home? Yes     Name and Contact number of agency [facility]     Is the pt/caregiver preference to resume services with current agency Yes     Do you take prescription medications? Yes     Do you have prescription coverage? Yes     Do you have any problems affording any of your prescribed medications? No     Is the patient taking medications as prescribed? yes     Who is going to help you get home at discharge? will need a ride home     How do you get to doctors appointments? family or friend will provide;car, drives self     Are you on dialysis? No     Do you take coumadin? No     Discharge Plan A Home with family;Home Health     Discharge Plan B Home with family     DME Needed Upon Discharge  none     Discharge Plan discussed with: Patient     Transition of Care Barriers None                     Readmission Assessment (most recent)       Readmission Assessment - 06/16/25 1405          Readmission    Was this a planned readmission? No     Why were you hospitalized in the last 30 days? Abnormal EKG     Why were you readmitted? New medical problem     When you left the hospital how did you feel? still felt bad     When you left the hospital where did you go? Home with Family     Did patient/caregiver refused recommended DC plan? No

## 2025-06-16 NOTE — ASSESSMENT & PLAN NOTE
-  patient states she from time to time, currently very itchy.  - Diflucan 150 mg p.o. once.  - topical clotrimazole per patient request.  - monitor.

## 2025-06-16 NOTE — SUBJECTIVE & OBJECTIVE
Interval History:  Patient new to me.  She came in for sickle cell pain and requested increase in her pain medication dose.  Also reports vaginal itching that is currently intense and requests antifungal medication for treatment.  Discussed with RN.    Review of Systems   Constitutional:  Negative for diaphoresis and fever.   Respiratory:  Negative for cough and shortness of breath.    Cardiovascular:  Negative for chest pain and palpitations.   Gastrointestinal:  Negative for abdominal pain and nausea.   Genitourinary:  Negative for dysuria.        Vaginal itching   Musculoskeletal:  Negative for gait problem.   Skin:  Negative for color change.   Neurological:  Negative for headaches.   Psychiatric/Behavioral:  Negative for agitation.      Objective:     Vital Signs (Most Recent):  Temp: 98.3 °F (36.8 °C) (06/16/25 1152)  Pulse: 75 (06/16/25 1152)  Resp: 16 (06/16/25 1204)  BP: 133/75 (06/16/25 1152)  SpO2: 98 % (06/16/25 1152) Vital Signs (24h Range):  Temp:  [97.7 °F (36.5 °C)-99.3 °F (37.4 °C)] 98.3 °F (36.8 °C)  Pulse:  [] 75  Resp:  [15-18] 16  SpO2:  [94 %-100 %] 98 %  BP: (101-179)/() 133/75     Weight: 99.7 kg (219 lb 12.8 oz)  Body mass index is 40.2 kg/m².    Intake/Output Summary (Last 24 hours) at 6/16/2025 1248  Last data filed at 6/16/2025 0821  Gross per 24 hour   Intake 610 ml   Output 2500 ml   Net -1890 ml         Physical Exam  Constitutional:       General: She is not in acute distress.     Appearance: Normal appearance. She is well-developed. She is obese.   HENT:      Head: Normocephalic and atraumatic.   Eyes:      Conjunctiva/sclera: Conjunctivae normal.      Pupils: Pupils are equal, round, and reactive to light.   Cardiovascular:      Rate and Rhythm: Normal rate and regular rhythm.      Pulses: Normal pulses.   Pulmonary:      Effort: Pulmonary effort is normal.      Breath sounds: Normal breath sounds.   Abdominal:      General: Bowel sounds are normal.      Palpations:  Abdomen is soft.      Tenderness: There is no abdominal tenderness.   Musculoskeletal:      Cervical back: Normal range of motion.      Right lower leg: Edema present.      Left lower leg: Edema present.      Comments: Trace bilateral lower extremity edema.  Has long scar right lateral calf area.   Skin:     General: Skin is warm and dry.   Neurological:      Mental Status: She is alert and oriented to person, place, and time.           Significant Labs: All pertinent labs within the past 24 hours have been reviewed.  CBC:   Recent Labs   Lab 06/15/25  1318 06/16/25  0434   WBC 8.71 9.14   HGB 9.2* 8.3*   HCT 28.3* 25.6*    320     CMP:   Recent Labs   Lab 06/15/25  1318 06/16/25  0434   * 144   K 3.8 3.8   * 111*   CO2 23 26   * 81   BUN 17 12   CREATININE 1.2 0.9   CALCIUM 9.1 8.6*   PROT 6.9 6.1   ALBUMIN 3.2* 2.9*   BILITOT 0.3 0.3   ALKPHOS 66 59   AST 20 18   ALT 16 13   ANIONGAP 11 7*     Coagulation:   Recent Labs   Lab 06/16/25  0434   INR 1.4*       Significant Imaging: I have reviewed all pertinent imaging results/findings within the past 24 hours.    Imaging Results              X-Ray Hip 2 or 3 views Left with Pelvis when performed (Final result)  Result time 06/15/25 17:30:15      Final result by Clarissa Antonio MD (06/15/25 17:30:15)                   Impression:      No acute displaced fracture seen.      Electronically signed by: Clarissa Antonio MD  Date:    06/15/2025  Time:    17:30               Narrative:    EXAMINATION:  XR HIP WITH PELVIS WHEN PERFORMED 2 OR 3 VIEWS LEFT    CLINICAL HISTORY:  Hip pain;    TECHNIQUE:  AP view of the pelvis and frog leg lateral view of the left hip were performed.    COMPARISON:  03/25/2025.    FINDINGS:  No evidence of acute displaced fracture, dislocation, or osseous destructive process.  Hip joint spaces appear fairly well maintained.                                       X-Ray Chest AP Portable (Final result)  Result time 06/15/25  13:45:21      Final result by Pee Decker MD (06/15/25 13:45:21)                   Impression:      As above      Electronically signed by: Pee Decker MD  Date:    06/15/2025  Time:    13:45               Narrative:    EXAMINATION:  XR CHEST AP PORTABLE    CLINICAL HISTORY:  sickle cell pain;    TECHNIQUE:  Frontal view of the chest was performed.    COMPARISON:  05/28/2025    FINDINGS:  Right chest port in stable position.  The cardiomediastinal silhouette is upper normal in size, similar to prior.    The lungs appear symmetrically expanded with few coarse interstitial markings.  No new confluent pulmonary parenchymal opacity. No pleural fluid or pneumothorax.

## 2025-06-16 NOTE — ASSESSMENT & PLAN NOTE
Patient's blood pressure range in the last 24 hours was: BP  Min: 101/51  Max: 139/107.The patient's inpatient anti-hypertensive regimen is listed below:  Current Antihypertensives  amLODIPine tablet 10 mg, Daily, Oral  prazosin capsule 1 mg, Nightly, Oral    Plan  - BP is controlled, no changes needed to their regimen

## 2025-06-17 LAB
ABSOLUTE EOSINOPHIL (OHS): 0.38 K/UL
ABSOLUTE MONOCYTE (OHS): 1.15 K/UL (ref 0.3–1)
ABSOLUTE NEUTROPHIL COUNT (OHS): 3.56 K/UL (ref 1.8–7.7)
ALBUMIN SERPL BCP-MCNC: 3 G/DL (ref 3.5–5.2)
ALP SERPL-CCNC: 61 UNIT/L (ref 40–150)
ALT SERPL W/O P-5'-P-CCNC: 17 UNIT/L (ref 10–44)
ANION GAP (OHS): 7 MMOL/L (ref 8–16)
AST SERPL-CCNC: 19 UNIT/L (ref 11–45)
BASOPHILS # BLD AUTO: 0.05 K/UL
BASOPHILS NFR BLD AUTO: 0.6 %
BILIRUB SERPL-MCNC: 0.3 MG/DL (ref 0.1–1)
BUN SERPL-MCNC: 13 MG/DL (ref 6–20)
CALCIUM SERPL-MCNC: 8.8 MG/DL (ref 8.7–10.5)
CHLORIDE SERPL-SCNC: 108 MMOL/L (ref 95–110)
CO2 SERPL-SCNC: 25 MMOL/L (ref 23–29)
CREAT SERPL-MCNC: 1.1 MG/DL (ref 0.5–1.4)
ERYTHROCYTE [DISTWIDTH] IN BLOOD BY AUTOMATED COUNT: 25.2 % (ref 11.5–14.5)
GFR SERPLBLD CREATININE-BSD FMLA CKD-EPI: >60 ML/MIN/1.73/M2
GLUCOSE SERPL-MCNC: 95 MG/DL (ref 70–110)
HCT VFR BLD AUTO: 25.1 % (ref 37–48.5)
HGB BLD-MCNC: 8.2 GM/DL (ref 12–16)
IMM GRANULOCYTES # BLD AUTO: 0.02 K/UL (ref 0–0.04)
IMM GRANULOCYTES NFR BLD AUTO: 0.2 % (ref 0–0.5)
INR PPP: 1.2 (ref 0.8–1.2)
LYMPHOCYTES # BLD AUTO: 3.21 K/UL (ref 1–4.8)
MCH RBC QN AUTO: 25.9 PG (ref 27–31)
MCHC RBC AUTO-ENTMCNC: 32.7 G/DL (ref 32–36)
MCV RBC AUTO: 79 FL (ref 82–98)
NUCLEATED RBC (/100WBC) (OHS): 3 /100 WBC
PLATELET # BLD AUTO: 321 K/UL (ref 150–450)
PMV BLD AUTO: 10 FL (ref 9.2–12.9)
POTASSIUM SERPL-SCNC: 3.9 MMOL/L (ref 3.5–5.1)
PROT SERPL-MCNC: 6.3 GM/DL (ref 6–8.4)
PROTHROMBIN TIME: 13.7 SECONDS (ref 9–12.5)
RBC # BLD AUTO: 3.17 M/UL (ref 4–5.4)
RELATIVE EOSINOPHIL (OHS): 4.5 %
RELATIVE LYMPHOCYTE (OHS): 38.4 % (ref 18–48)
RELATIVE MONOCYTE (OHS): 13.7 % (ref 4–15)
RELATIVE NEUTROPHIL (OHS): 42.6 % (ref 38–73)
SODIUM SERPL-SCNC: 140 MMOL/L (ref 136–145)
WBC # BLD AUTO: 8.37 K/UL (ref 3.9–12.7)

## 2025-06-17 PROCEDURE — 25000003 PHARM REV CODE 250: Performed by: NURSE PRACTITIONER

## 2025-06-17 PROCEDURE — 85025 COMPLETE CBC W/AUTO DIFF WBC: CPT | Performed by: NURSE PRACTITIONER

## 2025-06-17 PROCEDURE — 63600175 PHARM REV CODE 636 W HCPCS: Performed by: STUDENT IN AN ORGANIZED HEALTH CARE EDUCATION/TRAINING PROGRAM

## 2025-06-17 PROCEDURE — 21400001 HC TELEMETRY ROOM

## 2025-06-17 PROCEDURE — 36415 COLL VENOUS BLD VENIPUNCTURE: CPT | Performed by: HOSPITALIST

## 2025-06-17 PROCEDURE — 85610 PROTHROMBIN TIME: CPT | Performed by: HOSPITALIST

## 2025-06-17 PROCEDURE — 63600175 PHARM REV CODE 636 W HCPCS: Performed by: HOSPITALIST

## 2025-06-17 PROCEDURE — 25000003 PHARM REV CODE 250: Performed by: HOSPITALIST

## 2025-06-17 PROCEDURE — 63600175 PHARM REV CODE 636 W HCPCS: Performed by: NURSE PRACTITIONER

## 2025-06-17 PROCEDURE — 80053 COMPREHEN METABOLIC PANEL: CPT | Performed by: NURSE PRACTITIONER

## 2025-06-17 RX ORDER — HYDROMORPHONE HYDROCHLORIDE 1 MG/ML
3 INJECTION, SOLUTION INTRAMUSCULAR; INTRAVENOUS; SUBCUTANEOUS
Refills: 0 | Status: DISCONTINUED | OUTPATIENT
Start: 2025-06-17 | End: 2025-06-21 | Stop reason: HOSPADM

## 2025-06-17 RX ADMIN — FOLIC ACID 1 MG: 1 TABLET ORAL at 09:06

## 2025-06-17 RX ADMIN — LACTULOSE 15 G: 20 SOLUTION ORAL at 08:06

## 2025-06-17 RX ADMIN — GABAPENTIN 900 MG: 300 CAPSULE ORAL at 08:06

## 2025-06-17 RX ADMIN — MORPHINE SULFATE 30 MG: 15 TABLET, FILM COATED, EXTENDED RELEASE ORAL at 08:06

## 2025-06-17 RX ADMIN — DIPHENHYDRAMINE HYDROCHLORIDE 25 MG: 50 INJECTION, SOLUTION INTRAMUSCULAR; INTRAVENOUS at 09:06

## 2025-06-17 RX ADMIN — SENNOSIDES AND DOCUSATE SODIUM 2 TABLET: 50; 8.6 TABLET ORAL at 08:06

## 2025-06-17 RX ADMIN — LACTULOSE 15 G: 20 SOLUTION ORAL at 09:06

## 2025-06-17 RX ADMIN — OXYCODONE HYDROCHLORIDE AND ACETAMINOPHEN 1000 MG: 500 TABLET ORAL at 09:06

## 2025-06-17 RX ADMIN — DIPHENHYDRAMINE HYDROCHLORIDE 25 MG: 50 INJECTION, SOLUTION INTRAMUSCULAR; INTRAVENOUS at 02:06

## 2025-06-17 RX ADMIN — HYDROMORPHONE HYDROCHLORIDE 3 MG: 1 INJECTION, SOLUTION INTRAMUSCULAR; INTRAVENOUS; SUBCUTANEOUS at 03:06

## 2025-06-17 RX ADMIN — ACETAMINOPHEN 1000 MG: 500 TABLET, FILM COATED ORAL at 03:06

## 2025-06-17 RX ADMIN — WARFARIN SODIUM 4 MG: 2 TABLET ORAL at 06:06

## 2025-06-17 RX ADMIN — PRAZOSIN HYDROCHLORIDE 1 MG: 1 CAPSULE ORAL at 08:06

## 2025-06-17 RX ADMIN — DIPHENHYDRAMINE HYDROCHLORIDE 25 MG: 50 INJECTION, SOLUTION INTRAMUSCULAR; INTRAVENOUS at 06:06

## 2025-06-17 RX ADMIN — HYDROMORPHONE HYDROCHLORIDE 3 MG: 2 INJECTION INTRAMUSCULAR; INTRAVENOUS; SUBCUTANEOUS at 02:06

## 2025-06-17 RX ADMIN — DIPHENHYDRAMINE HYDROCHLORIDE 25 MG: 50 INJECTION, SOLUTION INTRAMUSCULAR; INTRAVENOUS at 12:06

## 2025-06-17 RX ADMIN — SODIUM CHLORIDE, POTASSIUM CHLORIDE, SODIUM LACTATE AND CALCIUM CHLORIDE: 600; 310; 30; 20 INJECTION, SOLUTION INTRAVENOUS at 06:06

## 2025-06-17 RX ADMIN — HYDROMORPHONE HYDROCHLORIDE 3 MG: 1 INJECTION, SOLUTION INTRAMUSCULAR; INTRAVENOUS; SUBCUTANEOUS at 09:06

## 2025-06-17 RX ADMIN — GABAPENTIN 900 MG: 300 CAPSULE ORAL at 03:06

## 2025-06-17 RX ADMIN — CLOTRIMAZOLE 1 APPLICATOR: 10 CREAM VAGINAL at 06:06

## 2025-06-17 RX ADMIN — HYDROMORPHONE HYDROCHLORIDE 3 MG: 1 INJECTION, SOLUTION INTRAMUSCULAR; INTRAVENOUS; SUBCUTANEOUS at 12:06

## 2025-06-17 RX ADMIN — MORPHINE SULFATE 30 MG: 15 TABLET, FILM COATED, EXTENDED RELEASE ORAL at 09:06

## 2025-06-17 RX ADMIN — HYDROMORPHONE HYDROCHLORIDE 3 MG: 1 INJECTION, SOLUTION INTRAMUSCULAR; INTRAVENOUS; SUBCUTANEOUS at 06:06

## 2025-06-17 RX ADMIN — AMLODIPINE BESYLATE 10 MG: 5 TABLET ORAL at 09:06

## 2025-06-17 RX ADMIN — GABAPENTIN 900 MG: 300 CAPSULE ORAL at 09:06

## 2025-06-17 RX ADMIN — TIZANIDINE 4 MG: 4 TABLET ORAL at 12:06

## 2025-06-17 RX ADMIN — DIPHENHYDRAMINE HYDROCHLORIDE 25 MG: 50 INJECTION, SOLUTION INTRAMUSCULAR; INTRAVENOUS at 03:06

## 2025-06-17 RX ADMIN — HYDROMORPHONE HYDROCHLORIDE 3 MG: 2 INJECTION INTRAMUSCULAR; INTRAVENOUS; SUBCUTANEOUS at 06:06

## 2025-06-17 RX ADMIN — MELATONIN TAB 3 MG 6 MG: 3 TAB at 08:06

## 2025-06-17 RX ADMIN — TIZANIDINE 4 MG: 4 TABLET ORAL at 08:06

## 2025-06-17 RX ADMIN — FLUOXETINE HYDROCHLORIDE 80 MG: 20 CAPSULE ORAL at 09:06

## 2025-06-17 RX ADMIN — PANTOPRAZOLE SODIUM 40 MG: 40 TABLET, DELAYED RELEASE ORAL at 09:06

## 2025-06-17 NOTE — ASSESSMENT & PLAN NOTE
Patient's blood pressure range in the last 24 hours was: BP  Min: 114/59  Max: 134/86.The patient's inpatient anti-hypertensive regimen is listed below:  Current Antihypertensives  amLODIPine tablet 10 mg, Daily, Oral  prazosin capsule 1 mg, Nightly, Oral    Plan  - BP is controlled, no changes needed to their regimen

## 2025-06-17 NOTE — ASSESSMENT & PLAN NOTE
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

## 2025-06-17 NOTE — ASSESSMENT & PLAN NOTE
Hx Avascular Necrosis    Patient presenting with bilateral hip pain consistent with sickle cell.  She reports worsening hip pain and is requesting hip x-ray.  Denies any falls or trauma.  Patient was recently admitted to [facility] for acute encephalopathy and sickle cell related pain and left AMA on 6/11/25.  She is followed by Dr. Soni outpatient.  Patient has received exchange transfusions in the past and per chart review used to get them regularly when she lived in [place].  Her most recent transfusion was performed 05/23/2025.  Reticulocytes 4.1% and hemoglobin 9.2    - Continue adjunct pain control with acetaminophen 1000mg PO q8hr, gabapentin at 900mg PO q8hr.  - Continue morphine 30mg PO q8hr, oxycodone 10mg PO q4hr PRN, hydromorphone at 3mg IV q3hr PRN, diphenhydramine 25mg IV q3hr PRN. Tizanidine 4mg PRN  - Continue IVFs with LR.  - Monitor CBC   - continue folic acid  - Supplemental oxygen

## 2025-06-17 NOTE — PLAN OF CARE
Plan of care reviewed with patient. Pain managed with PRN pain medications. Patient without needs at this time.Will note any changes as they occur.      Problem: Adult Inpatient Plan of Care  Goal: Plan of Care Review  Outcome: Progressing  Goal: Patient-Specific Goal (Individualized)  Outcome: Progressing  Goal: Absence of Hospital-Acquired Illness or Injury  Outcome: Progressing  Goal: Optimal Comfort and Wellbeing  Outcome: Progressing  Goal: Readiness for Transition of Care  Outcome: Progressing     Problem: Bariatric Environmental Safety  Goal: Safety Maintained with Care  Outcome: Progressing     Problem: Infection  Goal: Absence of Infection Signs and Symptoms  Outcome: Progressing

## 2025-06-17 NOTE — SUBJECTIVE & OBJECTIVE
Interval History: Vaginal sx slowly improving. Still with sickle cell pain.     Review of Systems   Constitutional:  Negative for diaphoresis and fever.   Respiratory:  Negative for cough and shortness of breath.    Cardiovascular:  Negative for chest pain and palpitations.   Gastrointestinal:  Negative for abdominal pain and nausea.   Genitourinary:  Negative for dysuria.        Vaginal itching   Musculoskeletal:  Positive for arthralgias and myalgias. Negative for gait problem.   Skin:  Negative for color change.   Neurological:  Negative for headaches.   Psychiatric/Behavioral:  Negative for agitation.      Objective:     Vital Signs (Most Recent):  Temp: 97.8 °F (36.6 °C) (06/17/25 0754)  Pulse: 71 (06/17/25 0754)  Resp: 18 (06/17/25 0931)  BP: 125/60 (06/17/25 0754)  SpO2: (!) 94 % (06/17/25 0754) Vital Signs (24h Range):  Temp:  [97.7 °F (36.5 °C)-98.3 °F (36.8 °C)] 97.8 °F (36.6 °C)  Pulse:  [70-93] 71  Resp:  [15-20] 18  SpO2:  [94 %-99 %] 94 %  BP: (114-134)/(59-87) 125/60     Weight: 99.8 kg (220 lb 0.3 oz)  Body mass index is 40.24 kg/m².    Intake/Output Summary (Last 24 hours) at 6/17/2025 1027  Last data filed at 6/17/2025 0821  Gross per 24 hour   Intake 4426.49 ml   Output 0 ml   Net 4426.49 ml         Physical Exam  Constitutional:       General: She is not in acute distress.  Pulmonary:      Effort: No respiratory distress.      Breath sounds: No wheezing.   Abdominal:      General: There is no distension.      Tenderness: There is no abdominal tenderness.   Musculoskeletal:      Right lower leg: No edema.      Left lower leg: No edema.   Skin:     Comments: Port in place    Neurological:      General: No focal deficit present.      Mental Status: She is oriented to person, place, and time.               Significant Labs: All pertinent labs within the past 24 hours have been reviewed.    Significant Imaging: I have reviewed all pertinent imaging results/findings within the past 24 hours.

## 2025-06-17 NOTE — PLAN OF CARE
Problem: Adult Inpatient Plan of Care  Goal: Plan of Care Review  Outcome: Progressing  Flowsheets (Taken 6/17/2025 0510)  Plan of Care Reviewed With: patient  Goal: Optimal Comfort and Wellbeing  Outcome: Progressing  Intervention: Monitor Pain and Promote Comfort  Flowsheets (Taken 6/17/2025 0510)  Pain Management Interventions: pain management plan reviewed with patient/caregiver

## 2025-06-17 NOTE — PROGRESS NOTES
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 She states she has been taking her prescribed medications with minimal relief and her pain worsened last night prompting her to come to ED.  Patient states she was admitted at [facility] for encephalopathy but left against medical advice due to not liking in the way she was treated.  Patient denies chest pain, vomiting fever and chills. She reports shortness of breath that occurs secondary to pain.  She is followed by Dr Soni, Hem/Onc.     ED work up significant for hemoglobin 9.2, sodium 147, reticulocytes 4.1%. Chest xray showed symmetrically expanded lungs with few coarse interstitial markings. Patient received 1L IV NS and IV dilaudid in the ED with minimal improvement. She was referred to hospital medicine and will be admitted for further evaluation and management.     Overview/Hospital Course:  Started on IV fluid, pain medication with Benadryl, treating sickle cell pain crisis.  She is continued on folic acid.  She reports vaginal yeast infection, has received Diflucan orally, and started topical clotrimazole.  INR daily as she is on Coumadin.    Interval History: Vaginal sx slowly improving. Still with sickle cell pain.     Review of Systems   Constitutional:  Negative for diaphoresis and fever.    Respiratory:  Negative for cough and shortness of breath.    Cardiovascular:  Negative for chest pain and palpitations.   Gastrointestinal:  Negative for abdominal pain and nausea.   Genitourinary:  Negative for dysuria.        Vaginal itching   Musculoskeletal:  Positive for arthralgias and myalgias. Negative for gait problem.   Skin:  Negative for color change.   Neurological:  Negative for headaches.   Psychiatric/Behavioral:  Negative for agitation.      Objective:     Vital Signs (Most Recent):  Temp: 97.8 °F (36.6 °C) (06/17/25 0754)  Pulse: 71 (06/17/25 0754)  Resp: 18 (06/17/25 0931)  BP: 125/60 (06/17/25 0754)  SpO2: (!) 94 % (06/17/25 0754) Vital Signs (24h Range):  Temp:  [97.7 °F (36.5 °C)-98.3 °F (36.8 °C)] 97.8 °F (36.6 °C)  Pulse:  [70-93] 71  Resp:  [15-20] 18  SpO2:  [94 %-99 %] 94 %  BP: (114-134)/(59-87) 125/60     Weight: 99.8 kg (220 lb 0.3 oz)  Body mass index is 40.24 kg/m².    Intake/Output Summary (Last 24 hours) at 6/17/2025 1027  Last data filed at 6/17/2025 0821  Gross per 24 hour   Intake 4426.49 ml   Output 0 ml   Net 4426.49 ml         Physical Exam  Constitutional:       General: She is not in acute distress.  Pulmonary:      Effort: No respiratory distress.      Breath sounds: No wheezing.   Abdominal:      General: There is no distension.      Tenderness: There is no abdominal tenderness.   Musculoskeletal:      Right lower leg: No edema.      Left lower leg: No edema.   Skin:     Comments: Port in place    Neurological:      General: No focal deficit present.      Mental Status: She is oriented to person, place, and time.               Significant Labs: All pertinent labs within the past 24 hours have been reviewed.    Significant Imaging: I have reviewed all pertinent imaging results/findings within the past 24 hours.      Assessment & Plan  Vaso-occlusive pain due to sickle cell disease  Hx Avascular Necrosis    Patient presenting with bilateral hip pain consistent with sickle  cell.  She reports worsening hip pain and is requesting hip x-ray.  Denies any falls or trauma.  Patient was recently admitted to [facility] for acute encephalopathy and sickle cell related pain and left AMA on 6/11/25.  She is followed by Dr. Soni outpatient.  Patient has received exchange transfusions in the past and per chart review used to get them regularly when she lived in [place].  Her most recent transfusion was performed 05/23/2025.  Reticulocytes 4.1% and hemoglobin 9.2    - Continue adjunct pain control with acetaminophen 1000mg PO q8hr, gabapentin at 900mg PO q8hr.  - Continue morphine 30mg PO q8hr, oxycodone 10mg PO q4hr PRN, hydromorphone at 3mg IV q3hr PRN, diphenhydramine 25mg IV q3hr PRN. Tizanidine 4mg PRN  - Continue IVFs with LR.  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  - Diflucan 150 mg p.o. once.  - topical clotrimazole per patient request.  - monitor.    VTE Risk Mitigation (From admission, onward)           Ordered     warfarin (COUMADIN) tablet 4 mg  Daily         06/16/25 1253     IP VTE HIGH RISK PATIENT  Once          06/15/25 1636     Place sequential compression device  Until discontinued         06/15/25 1636     Reason for No Pharmacological VTE Prophylaxis  Once        Question:  Reasons:  Answer:  Already adequately anticoagulated on oral Anticoagulants    06/15/25 1636                    Discharge Planning   ALYSE: 6/20/2025     Code Status: Full Code   Medical Readiness for Discharge Date:   Discharge Plan A: Home with family, Home Health              Sherine Foreman MD  Department of Hospital Medicine   [facility] ([address])

## 2025-06-17 NOTE — PLAN OF CARE
Problem: Infection  Goal: Absence of Infection Signs and Symptoms  Outcome: Progressing     Problem: Pain Acute  Goal: Optimal Pain Control and Function  Outcome: Progressing  Intervention: Develop Pain Management Plan  Flowsheets (Taken 6/17/2025 1700)  Pain Management Interventions:   care clustered   relaxation techniques promoted   quiet environment facilitated   pain management plan reviewed with patient/caregiver   medication offered

## 2025-06-18 LAB
ABSOLUTE EOSINOPHIL (OHS): 0.35 K/UL
ABSOLUTE MONOCYTE (OHS): 0.8 K/UL (ref 0.3–1)
ABSOLUTE NEUTROPHIL COUNT (OHS): 2.99 K/UL (ref 1.8–7.7)
ALBUMIN SERPL BCP-MCNC: 3 G/DL (ref 3.5–5.2)
ALP SERPL-CCNC: 63 UNIT/L (ref 40–150)
ALT SERPL W/O P-5'-P-CCNC: 15 UNIT/L (ref 10–44)
AMORPH CRY UR QL COMP ASSIST: ABNORMAL
ANION GAP (OHS): 11 MMOL/L (ref 8–16)
AST SERPL-CCNC: 22 UNIT/L (ref 11–45)
BACTERIA #/AREA URNS AUTO: ABNORMAL /HPF
BASOPHILS # BLD AUTO: 0.04 K/UL
BASOPHILS NFR BLD AUTO: 0.6 %
BILIRUB SERPL-MCNC: 0.3 MG/DL (ref 0.1–1)
BILIRUB UR QL STRIP.AUTO: NEGATIVE
BUN SERPL-MCNC: 14 MG/DL (ref 6–20)
CALCIUM SERPL-MCNC: 8.7 MG/DL (ref 8.7–10.5)
CHLORIDE SERPL-SCNC: 107 MMOL/L (ref 95–110)
CLARITY UR: CLEAR
CO2 SERPL-SCNC: 24 MMOL/L (ref 23–29)
COLOR UR AUTO: YELLOW
CREAT SERPL-MCNC: 1.1 MG/DL (ref 0.5–1.4)
ERYTHROCYTE [DISTWIDTH] IN BLOOD BY AUTOMATED COUNT: 25.5 % (ref 11.5–14.5)
GFR SERPLBLD CREATININE-BSD FMLA CKD-EPI: >60 ML/MIN/1.73/M2
GLUCOSE SERPL-MCNC: 95 MG/DL (ref 70–110)
GLUCOSE UR QL STRIP: NEGATIVE
HCT VFR BLD AUTO: 25.3 % (ref 37–48.5)
HGB BLD-MCNC: 8.2 GM/DL (ref 12–16)
HGB UR QL STRIP: NEGATIVE
HOLD SPECIMEN: NORMAL
HYALINE CASTS UR QL AUTO: 4 /LPF (ref 0–1)
IMM GRANULOCYTES # BLD AUTO: 0.04 K/UL (ref 0–0.04)
IMM GRANULOCYTES NFR BLD AUTO: 0.6 % (ref 0–0.5)
INR PPP: 1.4 (ref 0.8–1.2)
KETONES UR QL STRIP: NEGATIVE
LEUKOCYTE ESTERASE UR QL STRIP: ABNORMAL
LYMPHOCYTES # BLD AUTO: 2.67 K/UL (ref 1–4.8)
MCH RBC QN AUTO: 25.4 PG (ref 27–31)
MCHC RBC AUTO-ENTMCNC: 32.4 G/DL (ref 32–36)
MCV RBC AUTO: 78 FL (ref 82–98)
MICROSCOPIC COMMENT: ABNORMAL
NITRITE UR QL STRIP: NEGATIVE
NUCLEATED RBC (/100WBC) (OHS): 3 /100 WBC
PH UR STRIP: 5 [PH]
PLATELET # BLD AUTO: 359 K/UL (ref 150–450)
PMV BLD AUTO: 9.3 FL (ref 9.2–12.9)
POTASSIUM SERPL-SCNC: 4.2 MMOL/L (ref 3.5–5.1)
PROT SERPL-MCNC: 6.5 GM/DL (ref 6–8.4)
PROT UR QL STRIP: NEGATIVE
PROTHROMBIN TIME: 15 SECONDS (ref 9–12.5)
RBC # BLD AUTO: 3.23 M/UL (ref 4–5.4)
RBC #/AREA URNS AUTO: 5 /HPF (ref 0–4)
RELATIVE EOSINOPHIL (OHS): 5.1 %
RELATIVE LYMPHOCYTE (OHS): 38.8 % (ref 18–48)
RELATIVE MONOCYTE (OHS): 11.6 % (ref 4–15)
RELATIVE NEUTROPHIL (OHS): 43.3 % (ref 38–73)
SODIUM SERPL-SCNC: 142 MMOL/L (ref 136–145)
SP GR UR STRIP: 1.01
SQUAMOUS #/AREA URNS AUTO: 4 /HPF
UROBILINOGEN UR STRIP-ACNC: NEGATIVE EU/DL
WBC # BLD AUTO: 6.89 K/UL (ref 3.9–12.7)
WBC #/AREA URNS AUTO: 5 /HPF (ref 0–5)

## 2025-06-18 PROCEDURE — 25000003 PHARM REV CODE 250: Performed by: NURSE PRACTITIONER

## 2025-06-18 PROCEDURE — 11000001 HC ACUTE MED/SURG PRIVATE ROOM

## 2025-06-18 PROCEDURE — 25000003 PHARM REV CODE 250: Performed by: HOSPITALIST

## 2025-06-18 PROCEDURE — 80053 COMPREHEN METABOLIC PANEL: CPT | Performed by: NURSE PRACTITIONER

## 2025-06-18 PROCEDURE — 97165 OT EVAL LOW COMPLEX 30 MIN: CPT

## 2025-06-18 PROCEDURE — 85610 PROTHROMBIN TIME: CPT | Performed by: HOSPITALIST

## 2025-06-18 PROCEDURE — 63600175 PHARM REV CODE 636 W HCPCS: Performed by: NURSE PRACTITIONER

## 2025-06-18 PROCEDURE — 97161 PT EVAL LOW COMPLEX 20 MIN: CPT

## 2025-06-18 PROCEDURE — 85025 COMPLETE CBC W/AUTO DIFF WBC: CPT | Performed by: NURSE PRACTITIONER

## 2025-06-18 PROCEDURE — 63600175 PHARM REV CODE 636 W HCPCS: Performed by: STUDENT IN AN ORGANIZED HEALTH CARE EDUCATION/TRAINING PROGRAM

## 2025-06-18 PROCEDURE — 94761 N-INVAS EAR/PLS OXIMETRY MLT: CPT

## 2025-06-18 PROCEDURE — 81003 URINALYSIS AUTO W/O SCOPE: CPT | Performed by: NURSE PRACTITIONER

## 2025-06-18 RX ADMIN — DIPHENHYDRAMINE HYDROCHLORIDE 25 MG: 50 INJECTION, SOLUTION INTRAMUSCULAR; INTRAVENOUS at 03:06

## 2025-06-18 RX ADMIN — ACETAMINOPHEN 1000 MG: 500 TABLET, FILM COATED ORAL at 06:06

## 2025-06-18 RX ADMIN — TIZANIDINE 4 MG: 4 TABLET ORAL at 08:06

## 2025-06-18 RX ADMIN — DIPHENHYDRAMINE HYDROCHLORIDE 25 MG: 50 INJECTION, SOLUTION INTRAMUSCULAR; INTRAVENOUS at 06:06

## 2025-06-18 RX ADMIN — LACTULOSE 15 G: 20 SOLUTION ORAL at 08:06

## 2025-06-18 RX ADMIN — HYDROMORPHONE HYDROCHLORIDE 3 MG: 1 INJECTION, SOLUTION INTRAMUSCULAR; INTRAVENOUS; SUBCUTANEOUS at 09:06

## 2025-06-18 RX ADMIN — GABAPENTIN 900 MG: 300 CAPSULE ORAL at 03:06

## 2025-06-18 RX ADMIN — PANTOPRAZOLE SODIUM 40 MG: 40 TABLET, DELAYED RELEASE ORAL at 09:06

## 2025-06-18 RX ADMIN — HYDROMORPHONE HYDROCHLORIDE 3 MG: 1 INJECTION, SOLUTION INTRAMUSCULAR; INTRAVENOUS; SUBCUTANEOUS at 03:06

## 2025-06-18 RX ADMIN — TIZANIDINE 4 MG: 4 TABLET ORAL at 06:06

## 2025-06-18 RX ADMIN — HYDROMORPHONE HYDROCHLORIDE 3 MG: 1 INJECTION, SOLUTION INTRAMUSCULAR; INTRAVENOUS; SUBCUTANEOUS at 06:06

## 2025-06-18 RX ADMIN — FOLIC ACID 1 MG: 1 TABLET ORAL at 09:06

## 2025-06-18 RX ADMIN — GABAPENTIN 900 MG: 300 CAPSULE ORAL at 08:06

## 2025-06-18 RX ADMIN — SODIUM CHLORIDE, POTASSIUM CHLORIDE, SODIUM LACTATE AND CALCIUM CHLORIDE: 600; 310; 30; 20 INJECTION, SOLUTION INTRAVENOUS at 03:06

## 2025-06-18 RX ADMIN — OXYCODONE HYDROCHLORIDE AND ACETAMINOPHEN 1000 MG: 500 TABLET ORAL at 09:06

## 2025-06-18 RX ADMIN — SENNOSIDES AND DOCUSATE SODIUM 2 TABLET: 50; 8.6 TABLET ORAL at 08:06

## 2025-06-18 RX ADMIN — ACETAMINOPHEN 1000 MG: 500 TABLET, FILM COATED ORAL at 09:06

## 2025-06-18 RX ADMIN — FLUOXETINE HYDROCHLORIDE 80 MG: 20 CAPSULE ORAL at 09:06

## 2025-06-18 RX ADMIN — DIPHENHYDRAMINE HYDROCHLORIDE 25 MG: 50 INJECTION, SOLUTION INTRAMUSCULAR; INTRAVENOUS at 12:06

## 2025-06-18 RX ADMIN — MORPHINE SULFATE 30 MG: 15 TABLET, FILM COATED, EXTENDED RELEASE ORAL at 08:06

## 2025-06-18 RX ADMIN — DIPHENHYDRAMINE HYDROCHLORIDE 25 MG: 50 INJECTION, SOLUTION INTRAMUSCULAR; INTRAVENOUS at 09:06

## 2025-06-18 RX ADMIN — GABAPENTIN 900 MG: 300 CAPSULE ORAL at 09:06

## 2025-06-18 RX ADMIN — ACETAMINOPHEN 1000 MG: 500 TABLET, FILM COATED ORAL at 02:06

## 2025-06-18 RX ADMIN — MORPHINE SULFATE 30 MG: 15 TABLET, FILM COATED, EXTENDED RELEASE ORAL at 09:06

## 2025-06-18 RX ADMIN — AMLODIPINE BESYLATE 10 MG: 5 TABLET ORAL at 09:06

## 2025-06-18 RX ADMIN — MUPIROCIN: 20 OINTMENT TOPICAL at 09:06

## 2025-06-18 RX ADMIN — PRAZOSIN HYDROCHLORIDE 1 MG: 1 CAPSULE ORAL at 08:06

## 2025-06-18 RX ADMIN — HYDROMORPHONE HYDROCHLORIDE 3 MG: 1 INJECTION, SOLUTION INTRAMUSCULAR; INTRAVENOUS; SUBCUTANEOUS at 12:06

## 2025-06-18 RX ADMIN — LACTULOSE 15 G: 20 SOLUTION ORAL at 09:06

## 2025-06-18 RX ADMIN — CLOTRIMAZOLE 1 APPLICATOR: 10 CREAM VAGINAL at 06:06

## 2025-06-18 RX ADMIN — WARFARIN SODIUM 4 MG: 2 TABLET ORAL at 06:06

## 2025-06-18 NOTE — ASSESSMENT & PLAN NOTE
Hx Avascular Necrosis    Patient presenting with bilateral hip pain consistent with sickle cell.  She reports worsening hip pain and is requesting hip x-ray.  Denies any falls or trauma.  Patient was recently admitted to [facility] for acute encephalopathy and sickle cell related pain and left AMA on 6/11/25.  She is followed by Dr. Soni outpatient.  Patient has received exchange transfusions in the past and per chart review used to get them regularly when she lived in [place].  Her most recent transfusion was performed 05/23/2025.  Reticulocytes 4.1% and hemoglobin 9.2    - Continue adjunct pain control with acetaminophen 1000mg PO q8hr, gabapentin at 900mg PO q8hr.  - Continue morphine 30mg PO q8hr, oxycodone 10mg PO q4hr PRN, hydromorphone at 3mg IV q3hr PRN, diphenhydramine 25mg IV q3hr PRN. Tizanidine 4mg PRN  - Continue IVFs with LR.  - Monitor CBC   - continue folic acid  - Supplemental oxygen    - 6/18 - Still with severe pain in left hip. Limping to bathroom. Left hip xray - 'No evidence of acute displaced fracture, dislocation, or osseous destructive process. Hip joint spaces appear fairly well maintained.' Cont tx for sickle crisis. PT/OT

## 2025-06-18 NOTE — PLAN OF CARE
Plan of care reviewed with patient. No significant events overnight. Safety maintained.    Problem: Adult Inpatient Plan of Care  Goal: Plan of Care Review  Outcome: Progressing  Goal: Patient-Specific Goal (Individualized)  Outcome: Progressing  Goal: Absence of Hospital-Acquired Illness or Injury  Outcome: Progressing  Goal: Optimal Comfort and Wellbeing  Outcome: Progressing  Goal: Readiness for Transition of Care  Outcome: Progressing     Problem: Sickle Cell Disease  Goal: Optimal Cerebral Tissue Perfusion  Outcome: Progressing  Goal: Optimal Coping with Sickle Cell Disease  Outcome: Progressing  Goal: Effective Tissue Perfusion  Outcome: Progressing  Goal: Absence of Infection Signs and Symptoms  Outcome: Progressing  Goal: Optimal Pain Control and Function  Outcome: Progressing

## 2025-06-18 NOTE — PROGRESS NOTES
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 She states she has been taking her prescribed medications with minimal relief and her pain worsened last night prompting her to come to ED.  Patient states she was admitted at [facility] for encephalopathy but left against medical advice due to not liking in the way she was treated.  Patient denies chest pain, vomiting fever and chills. She reports shortness of breath that occurs secondary to pain.  She is followed by Dr Soni, Hem/Onc.     ED work up significant for hemoglobin 9.2, sodium 147, reticulocytes 4.1%. Chest xray showed symmetrically expanded lungs with few coarse interstitial markings. Patient received 1L IV NS and IV dilaudid in the ED with minimal improvement. She was referred to hospital medicine and will be admitted for further evaluation and management.     Overview/Hospital Course:  Started on IV fluid, pain medication with Benadryl, treating sickle cell pain crisis.  She is continued on folic acid.  She reports vaginal yeast infection, has received Diflucan orally, and started topical clotrimazole. Symptoms improving. INR daily as she is on Coumadin.    Interval History: Still with severe pain in left hip. Limping to bathroom.     Review of Systems   Constitutional:  Negative for diaphoresis  and fever.   Respiratory:  Negative for cough and shortness of breath.    Cardiovascular:  Negative for chest pain and palpitations.   Gastrointestinal:  Negative for abdominal pain and nausea.   Genitourinary:  Negative for dysuria.        Vaginal itching   Musculoskeletal:  Positive for arthralgias and myalgias. Negative for gait problem.   Skin:  Negative for color change.   Neurological:  Negative for headaches.   Psychiatric/Behavioral:  Negative for agitation.      Objective:     Vital Signs (Most Recent):  Temp: 98.9 °F (37.2 °C) (06/18/25 0715)  Pulse: 73 (06/18/25 0715)  Resp: 18 (06/18/25 0715)  BP: 130/75 (06/18/25 0715)  SpO2: 95 % (06/18/25 0715) Vital Signs (24h Range):  Temp:  [98 °F (36.7 °C)-98.9 °F (37.2 °C)] 98.9 °F (37.2 °C)  Pulse:  [73-85] 73  Resp:  [16-20] 18  SpO2:  [95 %-98 %] 95 %  BP: (101-132)/(60-75) 130/75     Weight: 99.8 kg (220 lb 0.3 oz)  Body mass index is 40.24 kg/m².    Intake/Output Summary (Last 24 hours) at 6/18/2025 0821  Last data filed at 6/18/2025 0650  Gross per 24 hour   Intake 1717.52 ml   Output 900 ml   Net 817.52 ml         Physical Exam  Constitutional:       General: She is not in acute distress.  Pulmonary:      Effort: No respiratory distress.      Breath sounds: No wheezing.   Abdominal:      General: There is no distension.      Tenderness: There is no abdominal tenderness.   Musculoskeletal:      Right lower leg: No edema.      Left lower leg: No edema.   Skin:     Comments: Port in place    Neurological:      General: No focal deficit present.      Mental Status: She is oriented to person, place, and time.               Significant Labs: All pertinent labs within the past 24 hours have been reviewed.    Significant Imaging: I have reviewed all pertinent imaging results/findings within the past 24 hours.      Assessment & Plan  Vaso-occlusive pain due to sickle cell disease  Hx Avascular Necrosis    Patient presenting with bilateral hip pain consistent with  sickle cell.  She reports worsening hip pain and is requesting hip x-ray.  Denies any falls or trauma.  Patient was recently admitted to [facility] for acute encephalopathy and sickle cell related pain and left AMA on 6/11/25.  She is followed by Dr. Soni outpatient.  Patient has received exchange transfusions in the past and per chart review used to get them regularly when she lived in [place].  Her most recent transfusion was performed 05/23/2025.  Reticulocytes 4.1% and hemoglobin 9.2    - Continue adjunct pain control with acetaminophen 1000mg PO q8hr, gabapentin at 900mg PO q8hr.  - Continue morphine 30mg PO q8hr, oxycodone 10mg PO q4hr PRN, hydromorphone at 3mg IV q3hr PRN, diphenhydramine 25mg IV q3hr PRN. Tizanidine 4mg PRN  - Continue IVFs with LR.  - Monitor CBC   - continue folic acid  - Supplemental oxygen    - 6/18 - Still with severe pain in left hip. Limping to bathroom. Left hip xray - 'No evidence of acute displaced fracture, dislocation, or osseous destructive process. Hip joint spaces appear fairly well maintained.' Cont tx for sickle crisis. 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  - Diflucan 150 mg p.o. once.  - topical clotrimazole per patient request.  - monitor.    VTE Risk Mitigation (From admission, onward)           Ordered     warfarin (COUMADIN) tablet 4 mg  Daily         06/16/25 1253     IP VTE HIGH RISK PATIENT  Once         06/15/25 1636     Place sequential compression device  Until discontinued         06/15/25 1636     Reason for No Pharmacological VTE Prophylaxis  Once        Question:  Reasons:  Answer:  Already adequately anticoagulated on oral Anticoagulants    06/15/25 1636                    Discharge Planning   ALYSE: 6/20/2025     Code Status: Full Code   Medical Readiness for Discharge Date:   Discharge Plan A: Home with family, Home Health              Sherine Foreman MD  Department of Hospital Medicine   [facility] ([address])

## 2025-06-18 NOTE — ASSESSMENT & PLAN NOTE
Patient's blood pressure range in the last 24 hours was: BP  Min: 101/60  Max: 132/67.The patient's inpatient anti-hypertensive regimen is listed below:  Current Antihypertensives  amLODIPine tablet 10 mg, Daily, Oral  prazosin capsule 1 mg, Nightly, Oral    Plan  - BP is controlled, no changes needed to their regimen

## 2025-06-18 NOTE — SUBJECTIVE & OBJECTIVE
Interval History: Still with severe pain in left hip. Limping to bathroom.     Review of Systems   Constitutional:  Negative for diaphoresis and fever.   Respiratory:  Negative for cough and shortness of breath.    Cardiovascular:  Negative for chest pain and palpitations.   Gastrointestinal:  Negative for abdominal pain and nausea.   Genitourinary:  Negative for dysuria.        Vaginal itching   Musculoskeletal:  Positive for arthralgias and myalgias. Negative for gait problem.   Skin:  Negative for color change.   Neurological:  Negative for headaches.   Psychiatric/Behavioral:  Negative for agitation.      Objective:     Vital Signs (Most Recent):  Temp: 98.9 °F (37.2 °C) (06/18/25 0715)  Pulse: 73 (06/18/25 0715)  Resp: 18 (06/18/25 0715)  BP: 130/75 (06/18/25 0715)  SpO2: 95 % (06/18/25 0715) Vital Signs (24h Range):  Temp:  [98 °F (36.7 °C)-98.9 °F (37.2 °C)] 98.9 °F (37.2 °C)  Pulse:  [73-85] 73  Resp:  [16-20] 18  SpO2:  [95 %-98 %] 95 %  BP: (101-132)/(60-75) 130/75     Weight: 99.8 kg (220 lb 0.3 oz)  Body mass index is 40.24 kg/m².    Intake/Output Summary (Last 24 hours) at 6/18/2025 0821  Last data filed at 6/18/2025 0650  Gross per 24 hour   Intake 1717.52 ml   Output 900 ml   Net 817.52 ml         Physical Exam  Constitutional:       General: She is not in acute distress.  Pulmonary:      Effort: No respiratory distress.      Breath sounds: No wheezing.   Abdominal:      General: There is no distension.      Tenderness: There is no abdominal tenderness.   Musculoskeletal:      Right lower leg: No edema.      Left lower leg: No edema.   Skin:     Comments: Port in place    Neurological:      General: No focal deficit present.      Mental Status: She is oriented to person, place, and time.               Significant Labs: All pertinent labs within the past 24 hours have been reviewed.    Significant Imaging: I have reviewed all pertinent imaging results/findings within the past 24 hours.

## 2025-06-19 LAB
ERYTHROCYTE [DISTWIDTH] IN BLOOD BY AUTOMATED COUNT: 25.5 % (ref 11.5–14.5)
HCT VFR BLD AUTO: 25.5 % (ref 37–48.5)
HGB BLD-MCNC: 8.3 GM/DL (ref 12–16)
INR PPP: 1.6 (ref 0.8–1.2)
MCH RBC QN AUTO: 25.2 PG (ref 27–31)
MCHC RBC AUTO-ENTMCNC: 32.5 G/DL (ref 32–36)
MCV RBC AUTO: 77 FL (ref 82–98)
PLATELET # BLD AUTO: 361 K/UL (ref 150–450)
PMV BLD AUTO: 8.7 FL (ref 9.2–12.9)
PROTHROMBIN TIME: 17.7 SECONDS (ref 9–12.5)
RBC # BLD AUTO: 3.3 M/UL (ref 4–5.4)
WBC # BLD AUTO: 7.1 K/UL (ref 3.9–12.7)

## 2025-06-19 PROCEDURE — 25000003 PHARM REV CODE 250: Performed by: NURSE PRACTITIONER

## 2025-06-19 PROCEDURE — 63600175 PHARM REV CODE 636 W HCPCS: Performed by: STUDENT IN AN ORGANIZED HEALTH CARE EDUCATION/TRAINING PROGRAM

## 2025-06-19 PROCEDURE — 25000003 PHARM REV CODE 250: Performed by: HOSPITALIST

## 2025-06-19 PROCEDURE — 94761 N-INVAS EAR/PLS OXIMETRY MLT: CPT

## 2025-06-19 PROCEDURE — 63600175 PHARM REV CODE 636 W HCPCS: Performed by: NURSE PRACTITIONER

## 2025-06-19 PROCEDURE — 11000001 HC ACUTE MED/SURG PRIVATE ROOM

## 2025-06-19 PROCEDURE — 85027 COMPLETE CBC AUTOMATED: CPT | Performed by: STUDENT IN AN ORGANIZED HEALTH CARE EDUCATION/TRAINING PROGRAM

## 2025-06-19 PROCEDURE — 25000003 PHARM REV CODE 250: Performed by: STUDENT IN AN ORGANIZED HEALTH CARE EDUCATION/TRAINING PROGRAM

## 2025-06-19 PROCEDURE — 85610 PROTHROMBIN TIME: CPT | Performed by: HOSPITALIST

## 2025-06-19 RX ADMIN — GABAPENTIN 900 MG: 300 CAPSULE ORAL at 08:06

## 2025-06-19 RX ADMIN — MELATONIN TAB 3 MG 6 MG: 3 TAB at 08:06

## 2025-06-19 RX ADMIN — SENNOSIDES AND DOCUSATE SODIUM 2 TABLET: 50; 8.6 TABLET ORAL at 09:06

## 2025-06-19 RX ADMIN — GABAPENTIN 900 MG: 300 CAPSULE ORAL at 10:06

## 2025-06-19 RX ADMIN — ONDANSETRON 4 MG: 2 INJECTION INTRAMUSCULAR; INTRAVENOUS at 10:06

## 2025-06-19 RX ADMIN — HYDROMORPHONE HYDROCHLORIDE 3 MG: 1 INJECTION, SOLUTION INTRAMUSCULAR; INTRAVENOUS; SUBCUTANEOUS at 12:06

## 2025-06-19 RX ADMIN — HYDROMORPHONE HYDROCHLORIDE 3 MG: 1 INJECTION, SOLUTION INTRAMUSCULAR; INTRAVENOUS; SUBCUTANEOUS at 10:06

## 2025-06-19 RX ADMIN — HYDROMORPHONE HYDROCHLORIDE 3 MG: 1 INJECTION, SOLUTION INTRAMUSCULAR; INTRAVENOUS; SUBCUTANEOUS at 06:06

## 2025-06-19 RX ADMIN — HYDROMORPHONE HYDROCHLORIDE 3 MG: 1 INJECTION, SOLUTION INTRAMUSCULAR; INTRAVENOUS; SUBCUTANEOUS at 04:06

## 2025-06-19 RX ADMIN — TIZANIDINE 4 MG: 4 TABLET ORAL at 06:06

## 2025-06-19 RX ADMIN — DIPHENHYDRAMINE HYDROCHLORIDE 25 MG: 50 INJECTION, SOLUTION INTRAMUSCULAR; INTRAVENOUS at 10:06

## 2025-06-19 RX ADMIN — FLUOXETINE HYDROCHLORIDE 80 MG: 20 CAPSULE ORAL at 10:06

## 2025-06-19 RX ADMIN — MICONAZOLE NITRATE: 20 OINTMENT TOPICAL at 08:06

## 2025-06-19 RX ADMIN — GABAPENTIN 900 MG: 300 CAPSULE ORAL at 04:06

## 2025-06-19 RX ADMIN — DIPHENHYDRAMINE HYDROCHLORIDE 25 MG: 50 INJECTION, SOLUTION INTRAMUSCULAR; INTRAVENOUS at 01:06

## 2025-06-19 RX ADMIN — LACTULOSE 15 G: 20 SOLUTION ORAL at 10:06

## 2025-06-19 RX ADMIN — ACETAMINOPHEN 1000 MG: 500 TABLET, FILM COATED ORAL at 06:06

## 2025-06-19 RX ADMIN — DIPHENHYDRAMINE HYDROCHLORIDE 25 MG: 50 INJECTION, SOLUTION INTRAMUSCULAR; INTRAVENOUS at 04:06

## 2025-06-19 RX ADMIN — TIZANIDINE 4 MG: 4 TABLET ORAL at 04:06

## 2025-06-19 RX ADMIN — WARFARIN SODIUM 4 MG: 2 TABLET ORAL at 04:06

## 2025-06-19 RX ADMIN — LACTULOSE 15 G: 20 SOLUTION ORAL at 08:06

## 2025-06-19 RX ADMIN — CLOTRIMAZOLE 1 APPLICATOR: 10 CREAM VAGINAL at 06:06

## 2025-06-19 RX ADMIN — DIPHENHYDRAMINE HYDROCHLORIDE 25 MG: 50 INJECTION, SOLUTION INTRAMUSCULAR; INTRAVENOUS at 12:06

## 2025-06-19 RX ADMIN — MORPHINE SULFATE 30 MG: 15 TABLET, FILM COATED, EXTENDED RELEASE ORAL at 10:06

## 2025-06-19 RX ADMIN — PANTOPRAZOLE SODIUM 40 MG: 40 TABLET, DELAYED RELEASE ORAL at 10:06

## 2025-06-19 RX ADMIN — DIPHENHYDRAMINE HYDROCHLORIDE 25 MG: 50 INJECTION, SOLUTION INTRAMUSCULAR; INTRAVENOUS at 06:06

## 2025-06-19 RX ADMIN — AMLODIPINE BESYLATE 10 MG: 5 TABLET ORAL at 10:06

## 2025-06-19 RX ADMIN — PRAZOSIN HYDROCHLORIDE 1 MG: 1 CAPSULE ORAL at 08:06

## 2025-06-19 RX ADMIN — OXYCODONE HYDROCHLORIDE AND ACETAMINOPHEN 1000 MG: 500 TABLET ORAL at 10:06

## 2025-06-19 RX ADMIN — MUPIROCIN 1 G: 20 OINTMENT TOPICAL at 08:06

## 2025-06-19 RX ADMIN — FOLIC ACID 1 MG: 1 TABLET ORAL at 10:06

## 2025-06-19 RX ADMIN — ACETAMINOPHEN 1000 MG: 500 TABLET, FILM COATED ORAL at 10:06

## 2025-06-19 RX ADMIN — MUPIROCIN: 20 OINTMENT TOPICAL at 10:06

## 2025-06-19 NOTE — ASSESSMENT & PLAN NOTE
Patient's blood pressure range in the last 24 hours was: BP  Min: 119/61  Max: 144/86.The patient's inpatient anti-hypertensive regimen is listed below:  Current Antihypertensives  amLODIPine tablet 10 mg, Daily, Oral  prazosin capsule 1 mg, Nightly, Oral    Plan  - BP is controlled, no changes needed to their regimen

## 2025-06-19 NOTE — ASSESSMENT & PLAN NOTE
Hx Avascular Necrosis    Patient presenting with bilateral hip pain consistent with sickle cell.  She reports worsening hip pain and is requesting hip x-ray.  Denies any falls or trauma.  Patient was recently admitted to [facility] for acute encephalopathy and sickle cell related pain and left AMA on 6/11/25.  She is followed by Dr. Soni outpatient.  Patient has received exchange transfusions in the past and per chart review used to get them regularly when she lived in [place].  Her most recent transfusion was performed 05/23/2025.  Reticulocytes 4.1% and hemoglobin 9.2    - Continue adjunct pain control with acetaminophen 1000mg PO q8hr, gabapentin at 900mg PO q8hr.  - Continue morphine 30mg PO q8hr, oxycodone 10mg PO q4hr PRN, hydromorphone at 3mg IV q3hr PRN, diphenhydramine 25mg IV q3hr PRN. Tizanidine 4mg PRN  - Continue IVFs with LR.  - Monitor CBC   - continue folic acid  - Supplemental oxygen    - 6/18 - Still with severe pain in left hip. Limping to bathroom. Left hip xray - 'No evidence of acute displaced fracture, dislocation, or osseous destructive process. Hip joint spaces appear fairly well maintained.' Cont tx for sickle crisis. PT/OT   - 6/19 - pain in left hip better, but still pain in right leg

## 2025-06-19 NOTE — PT/OT/SLP EVAL
Occupational Therapy   Evaluation and Discharge Note    Name: Nazanin Malone  MRN: [id number]  Admitting Diagnosis: Vaso-occlusive pain due to sickle cell disease  Recent Surgery: * No surgery found *      Recommendations:     Discharge Recommendations: Low Intensity Therapy  Discharge Equipment Recommendations: none  Barriers to discharge:  None    Assessment:     Nazanin Malone is a 45 y.o. female with a medical diagnosis of Vaso-occlusive pain due to sickle cell disease. At this time, patient is functioning at their prior level of function and does not require further acute OT services.     Plan:     During this hospitalization, patient does not require further acute OT services.  Please re-consult if situation changes.    Plan of Care Reviewed with: patient    Subjective     Chief Complaint: Right LE pain and weakness which has been ongoing since getting Rhabdomyolysis.   Patient/Family Comments/goals: To return home.    Occupational Profile:  Living Environment: Lives in [facility] with one step to enter, HR, tub/shower, Mother, 2 daughters and her grandmother live together  Previous level of function: Indep  Equipment Used at home: walker, rolling  Assistance upon Discharge: Family    Pain/Comfort:  Pain Rating 1:  (Pt not rating bilateral hip/LE pain.)  Pain Addressed 1: Distraction, Reposition    Patients cultural, spiritual, Zoroastrianism conflicts given the current situation: no    Objective:     Communicated with: nurseTiffani prior to session.  Patient found HOB elevated with peripheral IV, telemetry upon OT entry to room.    General Precautions: Standard, fall  Orthopedic Precautions: N/A  Braces: N/A  Respiratory Status: Room air     Occupational Performance:    Bed Mobility:    Supervision    Functional Mobility/Transfers:  Sit to stand: SBA without AD  Transfers: SBA without AD progressing to Supervision level  Functional Mobility: SBA progressing to Supervision    Activities of Daily  Living:  Toileting: SBA/Supervision  UB Dressing: Set up  Grooming: SBA/Supervision standing at sink    Cognitive/Visual Perceptual:  Cognitive/Psychosocial Skills:     -       Oriented to: Person, Place, Time, and Situation   -       Follows Commands/attention:Follows two-step commands  -       Communication: clear/fluent  -       Mood/Affect/Coping skills/emotional control: Cooperative and Pleasant    Physical Exam:  UE Function: AROM, strength and coordination are WFL for self care tasks  Sitting Balance: Good  Standing Balance: Good - static, SBA/Supervision for dynamic standing with minimal challenge    AMPAC 6 Click ADL:  AMPAC Total Score: 24    Treatment & Education:  Education Documentation  Treatment Plan, taught by Kristin Christian LOTR at 6/18/2025  6:58 PM.  Learner: Patient  Readiness: Acceptance  Method: Explanation  Response: Verbalizes Understanding  Comment: Role of OT, no further acute care OT needs with pt in agreement.        Patient left ambulatory in room with PT and PT student with all lines intact    GOALS:   Multidisciplinary Problems       Multidisciplinary Problems (Resolved)          Problem: Occupational Therapy    Goal Priority Disciplines Outcome Interventions   Occupational Therapy Goal   (Resolved)     OT, PT/OT Met    Description: Evaluation complete and no further acute care OT needs.  OT to sign off with pt in agreement.                        DME Justifications:  No DME recommended requiring DME justifications    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/2017    Acute venous embolism and thrombosis of internal jugular veins     Asthma     Avascular necrosis of femur     Depression     History of pulmonary embolism     Hypertension     Morbid obesity     Opioid dependence 04/16/2017    Pneumonia due to Streptococcus pneumoniae 04/25/2017    Right lower lobe  pneumonia 2017    Sepsis due to Streptococcus pneumoniae 2017    Sickle cell-beta thalassemia disease with pain     Trigeminal neuralgia          Past Surgical History:   Procedure Laterality Date     SECTION      ESOPHAGOGASTRODUODENOSCOPY N/A 2024    Procedure: EGD (ESOPHAGOGASTRODUODENOSCOPY);  Surgeon: Allen Marshall MD;  Location: [address]);  Service: Gastroenterology;  Laterality: N/A;    TONSILLECTOMY      TUBAL LIGATION         Time Tracking:     OT Date of Treatment: 25  OT Start Time: 1014  OT Stop Time: 1037  OT Total Time (min): 23 min    Billable Minutes:Evaluation 23    2025

## 2025-06-19 NOTE — CONSULTS
[facility] - Med Surg ([address])  Wound Care    Patient Name:  Nazanin Malone   MRN:  [id number]  Date: 6/19/2025  Diagnosis: Vaso-occlusive pain due to sickle cell disease    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/2017    Acute venous embolism and thrombosis of internal jugular veins     Asthma     Avascular necrosis of femur     Depression     History of pulmonary embolism     Hypertension     Morbid obesity     Opioid dependence 04/16/2017    Pneumonia due to Streptococcus pneumoniae 04/25/2017    Right lower lobe pneumonia 04/29/2017    Sepsis due to Streptococcus pneumoniae 04/25/2017    Sickle cell-beta thalassemia disease with pain     Trigeminal neuralgia        Social History[1]    Precautions:     Allergies as of 06/15/2025 - Reviewed 06/15/2025   Allergen Reaction Noted    Cat dander Anaphylaxis, Itching, and Shortness Of Breath 10/08/2024    Nsaids (non-steroidal anti-inflammatory drug) Anaphylaxis, Itching, and Shortness Of Breath 09/13/2024    Latex Rash 10/08/2024       [facility] Assessment Details/Treatment     Wound care consult received for assessment of inner thighs. 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 She states she has been taking her prescribed medications with minimal relief and her pain worsened last night prompting her to come to ED.  Patient states she was admitted at [facility] for encephalopathy but left against medical advice due to not liking in the way she was treated.  Patient denies chest pain, vomiting fever and chills. She reports shortness of breath that occurs secondary  to pain.  She is followed by Dr Soni, Hem/Onc.      ED work up significant for hemoglobin 9.2, sodium 147, reticulocytes 4.1%. Chest xray showed symmetrically expanded lungs with few coarse interstitial markings. Patient received 1L IV NS and IV dilaudid in the ED with minimal improvement. She was referred to hospital medicine and will be admitted for further evaluation and management.     Upon assessment to inner medial thighs noted intact, moist skin with mild redness. Patient complaining of itching/pain/irritation. Recommend applying a miconazole moisture barrier ointment to affected area to manage moisture/yeast.     Inner thigh/groin: cleanse with cleansing cloths/ wound cleanser and apply clear moisture barrier ointment with miconazole (green top) BID and prn cleaning.    Nursing and MD team notified. Orders placed. Wound care following.      06/19/25 0750        Wound 06/19/25 Intertrigo  Thigh #1   Date First Assessed: 06/19/25   Present on Original Admission: No  Primary Wound Type: Intertrigo  Side: (c)   Location: Thigh  Wound Number: #1  Is this injury device related?: No   Dressing Appearance Open to air   Drainage Amount None   Drainage Characteristics/Odor No odor   Appearance Moist;Intact   Tissue loss description Not applicable   Periwound Area Moist;Redness;Satellite lesion   Care Skin Barrier               06/19/2025         [1]   Social History  Socioeconomic History    Marital status: Single   Tobacco Use    Smoking status: Never    Smokeless tobacco: Never   Vaping Use    Vaping status: Never Used   Substance and Sexual Activity    Alcohol use: No    Drug use: Yes     Types: Marijuana     Comment: periodically     Sexual activity: Not Currently     Birth control/protection: See Surgical Hx     Social Drivers of Health     Financial Resource Strain: Patient Declined (6/15/2025)    Overall Financial Resource Strain (CARDIA)     Difficulty of Paying Living Expenses: Patient declined   Food  Insecurity: Patient Declined (6/15/2025)    Hunger Vital Sign     Worried About Running Out of Food in the Last Year: Patient declined     Ran Out of Food in the Last Year: Patient declined   Transportation Needs: Patient Declined (6/15/2025)    PRAPARE - Transportation     Lack of Transportation (Medical): Patient declined     Lack of Transportation (Non-Medical): Patient declined   Physical Activity: Inactive (5/21/2025)    Exercise Vital Sign     Days of Exercise per Week: 0 days     Minutes of Exercise per Session: 0 min   Stress: Patient Declined (6/15/2025)    [employer] - Occupational Stress Questionnaire     Feeling of Stress : Patient declined   Recent Concern: Stress - Stress Concern Present (4/12/2025)    [employer] - Occupational Stress Questionnaire     Feeling of Stress : To some extent   Housing Stability: Patient Declined (6/15/2025)    Housing Stability Vital Sign     Unable to Pay for Housing in the Last Year: Patient declined     Homeless in the Last Year: Patient declined

## 2025-06-19 NOTE — PROGRESS NOTES
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 She states she has been taking her prescribed medications with minimal relief and her pain worsened last night prompting her to come to ED.  Patient states she was admitted at [facility] for encephalopathy but left against medical advice due to not liking in the way she was treated.  Patient denies chest pain, vomiting fever and chills. She reports shortness of breath that occurs secondary to pain.  She is followed by Dr Soni, Hem/Onc.     ED work up significant for hemoglobin 9.2, sodium 147, reticulocytes 4.1%. Chest xray showed symmetrically expanded lungs with few coarse interstitial markings. Patient received 1L IV NS and IV dilaudid in the ED with minimal improvement. She was referred to hospital medicine and will be admitted for further evaluation and management.     Overview/Hospital Course:  Started on IV fluid, pain medication with Benadryl, treating sickle cell pain crisis.  She is continued on folic acid.  She reports vaginal yeast infection, has received Diflucan orally, and started topical clotrimazole. Symptoms improving. INR daily as she is on Coumadin. 6/19 - stop fluids due to bloating. Left axilla round lesion - US. PT/OT.     Interval History: Did well with PT/OT yesterday. Worse pain in  right leg today. Stop fluids due to bloating.     Review of Systems   Constitutional:  Negative for diaphoresis and fever.   Respiratory:  Negative for cough and shortness of breath.    Cardiovascular:  Negative for chest pain and palpitations.   Gastrointestinal:  Negative for abdominal pain and nausea.   Genitourinary:  Negative for dysuria.        Vaginal itching   Musculoskeletal:  Positive for arthralgias and myalgias. Negative for gait problem.   Skin:  Negative for color change.   Neurological:  Negative for headaches.   Psychiatric/Behavioral:  Negative for agitation.      Objective:     Vital Signs (Most Recent):  Temp: 98.4 °F (36.9 °C) (06/19/25 0724)  Pulse: 86 (06/19/25 0724)  Resp: 16 (06/19/25 1015)  BP: 132/85 (06/19/25 0724)  SpO2: 96 % (06/19/25 0724) Vital Signs (24h Range):  Temp:  [97.9 °F (36.6 °C)-98.9 °F (37.2 °C)] 98.4 °F (36.9 °C)  Pulse:  [72-95] 86  Resp:  [16-20] 16  SpO2:  [95 %-97 %] 96 %  BP: (119-144)/(61-90) 132/85     Weight: 99.8 kg (220 lb 0.3 oz)  Body mass index is 40.24 kg/m².    Intake/Output Summary (Last 24 hours) at 6/19/2025 1048  Last data filed at 6/19/2025 0443  Gross per 24 hour   Intake 240 ml   Output --   Net 240 ml         Physical Exam  Constitutional:       General: She is not in acute distress.  Pulmonary:      Effort: No respiratory distress.      Breath sounds: No wheezing.   Abdominal:      General: There is no distension.      Tenderness: There is no abdominal tenderness.   Musculoskeletal:      Right lower leg: No edema.      Left lower leg: No edema.   Skin:     Comments: Port in place    Neurological:      General: No focal deficit present.      Mental Status: She is oriented to person, place, and time.               Significant Labs: All pertinent labs within the past 24 hours have been reviewed.    Significant Imaging: I have reviewed all pertinent imaging results/findings within the past 24 hours.      Assessment & Plan  Vaso-occlusive pain due to  sickle cell disease  Hx Avascular Necrosis    Patient presenting with bilateral hip pain consistent with sickle cell.  She reports worsening hip pain and is requesting hip x-ray.  Denies any falls or trauma.  Patient was recently admitted to [facility] for acute encephalopathy and sickle cell related pain and left AMA on 6/11/25.  She is followed by Dr. Soni outpatient.  Patient has received exchange transfusions in the past and per chart review used to get them regularly when she lived in [place].  Her most recent transfusion was performed 05/23/2025.  Reticulocytes 4.1% and hemoglobin 9.2    - Continue adjunct pain control with acetaminophen 1000mg PO q8hr, gabapentin at 900mg PO q8hr.  - Continue morphine 30mg PO q8hr, oxycodone 10mg PO q4hr PRN, hydromorphone at 3mg IV q3hr PRN, diphenhydramine 25mg IV q3hr PRN. Tizanidine 4mg PRN  - Continue IVFs with LR.  - Monitor CBC   - continue folic acid  - Supplemental oxygen    - 6/18 - Still with severe pain in left hip. Limping to bathroom. Left hip xray - 'No evidence of acute displaced fracture, dislocation, or osseous destructive process. Hip joint spaces appear fairly well maintained.' Cont tx for sickle crisis. 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  - Diflucan 150 mg p.o. once.  - topical clotrimazole per patient request.  - monitor.    VTE Risk Mitigation (From admission, onward)           Ordered     warfarin (COUMADIN) tablet 4 mg  Daily         06/16/25 1253     IP VTE HIGH RISK PATIENT  Once         06/15/25 1636     Place sequential compression device  Until discontinued         06/15/25 1636     Reason for No Pharmacological VTE Prophylaxis  Once        Question:  Reasons:  Answer:  Already adequately anticoagulated on oral Anticoagulants    06/15/25 1636                    Discharge Planning   ALYSE: 6/24/2025     Code Status: Full Code   Medical Readiness for Discharge Date:   Discharge Plan A: Home with family, Home Health            Sherine Foreman MD  Department of Hospital Medicine   [facility] - Med Surg ([address])

## 2025-06-19 NOTE — SUBJECTIVE & OBJECTIVE
Interval History: Did well with PT/OT. Worse pain in right leg today. Stop fluids due to bloating.     Review of Systems   Constitutional:  Negative for diaphoresis and fever.   Respiratory:  Negative for cough and shortness of breath.    Cardiovascular:  Negative for chest pain and palpitations.   Gastrointestinal:  Negative for abdominal pain and nausea.   Genitourinary:  Negative for dysuria.        Vaginal itching   Musculoskeletal:  Positive for arthralgias and myalgias. Negative for gait problem.   Skin:  Negative for color change.   Neurological:  Negative for headaches.   Psychiatric/Behavioral:  Negative for agitation.      Objective:     Vital Signs (Most Recent):  Temp: 98.4 °F (36.9 °C) (06/19/25 0724)  Pulse: 86 (06/19/25 0724)  Resp: 16 (06/19/25 1015)  BP: 132/85 (06/19/25 0724)  SpO2: 96 % (06/19/25 0724) Vital Signs (24h Range):  Temp:  [97.9 °F (36.6 °C)-98.9 °F (37.2 °C)] 98.4 °F (36.9 °C)  Pulse:  [72-95] 86  Resp:  [16-20] 16  SpO2:  [95 %-97 %] 96 %  BP: (119-144)/(61-90) 132/85     Weight: 99.8 kg (220 lb 0.3 oz)  Body mass index is 40.24 kg/m².    Intake/Output Summary (Last 24 hours) at 6/19/2025 1048  Last data filed at 6/19/2025 0443  Gross per 24 hour   Intake 240 ml   Output --   Net 240 ml         Physical Exam  Constitutional:       General: She is not in acute distress.  Pulmonary:      Effort: No respiratory distress.      Breath sounds: No wheezing.   Abdominal:      General: There is no distension.      Tenderness: There is no abdominal tenderness.   Musculoskeletal:      Right lower leg: No edema.      Left lower leg: No edema.   Skin:     Comments: Port in place    Neurological:      General: No focal deficit present.      Mental Status: She is oriented to person, place, and time.               Significant Labs: All pertinent labs within the past 24 hours have been reviewed.    Significant Imaging: I have reviewed all pertinent imaging results/findings within the past 24 hours.

## 2025-06-19 NOTE — PLAN OF CARE
Problem: Occupational Therapy  Goal: Occupational Therapy Goal  Description: Evaluation complete and no further acute care OT needs.  OT to sign off with pt in agreement.   Outcome: Met

## 2025-06-20 LAB
ERYTHROCYTE [DISTWIDTH] IN BLOOD BY AUTOMATED COUNT: 25.4 % (ref 11.5–14.5)
HCT VFR BLD AUTO: 24.4 % (ref 37–48.5)
HGB BLD-MCNC: 8 GM/DL (ref 12–16)
INR PPP: 2.1 (ref 0.8–1.2)
MCH RBC QN AUTO: 25.1 PG (ref 27–31)
MCHC RBC AUTO-ENTMCNC: 32.8 G/DL (ref 32–36)
MCV RBC AUTO: 77 FL (ref 82–98)
PLATELET # BLD AUTO: 344 K/UL (ref 150–450)
PMV BLD AUTO: 9.1 FL (ref 9.2–12.9)
PROTHROMBIN TIME: 22.4 SECONDS (ref 9–12.5)
RBC # BLD AUTO: 3.19 M/UL (ref 4–5.4)
WBC # BLD AUTO: 7.98 K/UL (ref 3.9–12.7)

## 2025-06-20 PROCEDURE — 85610 PROTHROMBIN TIME: CPT | Performed by: HOSPITALIST

## 2025-06-20 PROCEDURE — 63600175 PHARM REV CODE 636 W HCPCS: Performed by: NURSE PRACTITIONER

## 2025-06-20 PROCEDURE — 25000003 PHARM REV CODE 250: Performed by: NURSE PRACTITIONER

## 2025-06-20 PROCEDURE — 85027 COMPLETE CBC AUTOMATED: CPT | Performed by: STUDENT IN AN ORGANIZED HEALTH CARE EDUCATION/TRAINING PROGRAM

## 2025-06-20 PROCEDURE — 11000001 HC ACUTE MED/SURG PRIVATE ROOM

## 2025-06-20 PROCEDURE — 25000003 PHARM REV CODE 250: Performed by: HOSPITALIST

## 2025-06-20 PROCEDURE — 63600175 PHARM REV CODE 636 W HCPCS: Performed by: STUDENT IN AN ORGANIZED HEALTH CARE EDUCATION/TRAINING PROGRAM

## 2025-06-20 RX ADMIN — TIZANIDINE 4 MG: 4 TABLET ORAL at 12:06

## 2025-06-20 RX ADMIN — ACETAMINOPHEN 1000 MG: 500 TABLET, FILM COATED ORAL at 09:06

## 2025-06-20 RX ADMIN — DIPHENHYDRAMINE HYDROCHLORIDE 25 MG: 50 INJECTION, SOLUTION INTRAMUSCULAR; INTRAVENOUS at 07:06

## 2025-06-20 RX ADMIN — FOLIC ACID 1 MG: 1 TABLET ORAL at 08:06

## 2025-06-20 RX ADMIN — MELATONIN TAB 3 MG 6 MG: 3 TAB at 08:06

## 2025-06-20 RX ADMIN — DIPHENHYDRAMINE HYDROCHLORIDE 25 MG: 50 INJECTION, SOLUTION INTRAMUSCULAR; INTRAVENOUS at 10:06

## 2025-06-20 RX ADMIN — AMLODIPINE BESYLATE 10 MG: 5 TABLET ORAL at 08:06

## 2025-06-20 RX ADMIN — SENNOSIDES AND DOCUSATE SODIUM 2 TABLET: 50; 8.6 TABLET ORAL at 08:06

## 2025-06-20 RX ADMIN — HYDROMORPHONE HYDROCHLORIDE 3 MG: 1 INJECTION, SOLUTION INTRAMUSCULAR; INTRAVENOUS; SUBCUTANEOUS at 01:06

## 2025-06-20 RX ADMIN — MICONAZOLE NITRATE: 20 OINTMENT TOPICAL at 08:06

## 2025-06-20 RX ADMIN — LACTULOSE 15 G: 20 SOLUTION ORAL at 08:06

## 2025-06-20 RX ADMIN — TIZANIDINE 4 MG: 4 TABLET ORAL at 08:06

## 2025-06-20 RX ADMIN — DIPHENHYDRAMINE HYDROCHLORIDE 25 MG: 50 INJECTION, SOLUTION INTRAMUSCULAR; INTRAVENOUS at 08:06

## 2025-06-20 RX ADMIN — GABAPENTIN 900 MG: 300 CAPSULE ORAL at 08:06

## 2025-06-20 RX ADMIN — HYDROMORPHONE HYDROCHLORIDE 3 MG: 1 INJECTION, SOLUTION INTRAMUSCULAR; INTRAVENOUS; SUBCUTANEOUS at 08:06

## 2025-06-20 RX ADMIN — DIPHENHYDRAMINE HYDROCHLORIDE 25 MG: 50 INJECTION, SOLUTION INTRAMUSCULAR; INTRAVENOUS at 04:06

## 2025-06-20 RX ADMIN — DIPHENHYDRAMINE HYDROCHLORIDE 25 MG: 50 INJECTION, SOLUTION INTRAMUSCULAR; INTRAVENOUS at 02:06

## 2025-06-20 RX ADMIN — PANTOPRAZOLE SODIUM 40 MG: 40 TABLET, DELAYED RELEASE ORAL at 10:06

## 2025-06-20 RX ADMIN — ACETAMINOPHEN 1000 MG: 500 TABLET, FILM COATED ORAL at 05:06

## 2025-06-20 RX ADMIN — OXYCODONE HYDROCHLORIDE AND ACETAMINOPHEN 1000 MG: 500 TABLET ORAL at 08:06

## 2025-06-20 RX ADMIN — PRAZOSIN HYDROCHLORIDE 1 MG: 1 CAPSULE ORAL at 08:06

## 2025-06-20 RX ADMIN — HYDROMORPHONE HYDROCHLORIDE 3 MG: 1 INJECTION, SOLUTION INTRAMUSCULAR; INTRAVENOUS; SUBCUTANEOUS at 07:06

## 2025-06-20 RX ADMIN — CLOTRIMAZOLE 1 APPLICATOR: 10 CREAM VAGINAL at 04:06

## 2025-06-20 RX ADMIN — MORPHINE SULFATE 30 MG: 15 TABLET, FILM COATED, EXTENDED RELEASE ORAL at 08:06

## 2025-06-20 RX ADMIN — HYDROMORPHONE HYDROCHLORIDE 3 MG: 1 INJECTION, SOLUTION INTRAMUSCULAR; INTRAVENOUS; SUBCUTANEOUS at 04:06

## 2025-06-20 RX ADMIN — MUPIROCIN: 20 OINTMENT TOPICAL at 08:06

## 2025-06-20 RX ADMIN — HYDROMORPHONE HYDROCHLORIDE 3 MG: 1 INJECTION, SOLUTION INTRAMUSCULAR; INTRAVENOUS; SUBCUTANEOUS at 10:06

## 2025-06-20 RX ADMIN — ACETAMINOPHEN 1000 MG: 500 TABLET, FILM COATED ORAL at 02:06

## 2025-06-20 RX ADMIN — FLUOXETINE HYDROCHLORIDE 80 MG: 20 CAPSULE ORAL at 08:06

## 2025-06-20 RX ADMIN — HYDROMORPHONE HYDROCHLORIDE 3 MG: 1 INJECTION, SOLUTION INTRAMUSCULAR; INTRAVENOUS; SUBCUTANEOUS at 02:06

## 2025-06-20 RX ADMIN — MORPHINE SULFATE 30 MG: 15 TABLET, FILM COATED, EXTENDED RELEASE ORAL at 09:06

## 2025-06-20 RX ADMIN — WARFARIN SODIUM 4 MG: 2 TABLET ORAL at 04:06

## 2025-06-20 RX ADMIN — GABAPENTIN 900 MG: 300 CAPSULE ORAL at 02:06

## 2025-06-20 RX ADMIN — DIPHENHYDRAMINE HYDROCHLORIDE 25 MG: 50 INJECTION, SOLUTION INTRAMUSCULAR; INTRAVENOUS at 01:06

## 2025-06-20 NOTE — ASSESSMENT & PLAN NOTE
Patient's blood pressure range in the last 24 hours was: BP  Min: 108/59  Max: 156/68.The patient's inpatient anti-hypertensive regimen is listed below:  Current Antihypertensives  amLODIPine tablet 10 mg, Daily, Oral  prazosin capsule 1 mg, Nightly, Oral    Plan  - BP is controlled, no changes needed to their regimen

## 2025-06-20 NOTE — ASSESSMENT & PLAN NOTE
Hx Avascular Necrosis    Patient presenting with bilateral hip pain consistent with sickle cell.  She reports worsening hip pain and is requesting hip x-ray.  Denies any falls or trauma.  Patient was recently admitted to [facility] for acute encephalopathy and sickle cell related pain and left AMA on 6/11/25.  She is followed by Dr. Soni outpatient.  Patient has received exchange transfusions in the past and per chart review used to get them regularly when she lived in [place].  Her most recent transfusion was performed 05/23/2025.  Reticulocytes 4.1% and hemoglobin 9.2    - Continue adjunct pain control with acetaminophen 1000mg PO q8hr, gabapentin at 900mg PO q8hr.  - Continue morphine 30mg PO q8hr, oxycodone 10mg PO q4hr PRN, hydromorphone at 3mg IV q3hr PRN, diphenhydramine 25mg IV q3hr PRN. Tizanidine 4mg PRN  - Continue IVFs with LR - stopped for bloating .  - Monitor CBC   - continue folic acid  - Supplemental oxygen    - 6/18 - Still with severe pain in left hip. Limping to bathroom. Left hip xray - 'No evidence of acute displaced fracture, dislocation, or osseous destructive process. Hip joint spaces appear fairly well maintained.' Cont tx for sickle crisis. PT/OT   - 6/19 - pain in left hip better, but still pain in right leg

## 2025-06-20 NOTE — PLAN OF CARE
06/20/25 1544   Post-Acute Status   Post-Acute Authorization Home Health   Home Health Status Referrals Sent   Patient choice form signed by patient/caregiver List with quality metrics by geographic area provided;List from CMS Compare;List from System Post-Acute Care   Discharge Delays None known at this time   Discharge Plan   Discharge Plan A Home with family;Home Health   Discharge Plan B Home with family     Met with patient to review discharge recommendation of HH and is agreeable to plan    Patient/family provided list of facilities in-network with patient's payor plan. Providers that are owned, operated, or affiliated with [facility] are included on the list.     Notified that referral sent to below listed facilities from in-network list based on proximity to home/family support:   [facility] care      If an additional preferred facility not listed above is identified, additional referral to be sent. If above facilities unable to accept, will send additional referrals to in-network providers.

## 2025-06-20 NOTE — PROGRESS NOTES
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 She states she has been taking her prescribed medications with minimal relief and her pain worsened last night prompting her to come to ED.  Patient states she was admitted at [facility] for encephalopathy but left against medical advice due to not liking in the way she was treated.  Patient denies chest pain, vomiting fever and chills. She reports shortness of breath that occurs secondary to pain.  She is followed by Dr Soni, Hem/Onc.     ED work up significant for hemoglobin 9.2, sodium 147, reticulocytes 4.1%. Chest xray showed symmetrically expanded lungs with few coarse interstitial markings. Patient received 1L IV NS and IV dilaudid in the ED with minimal improvement. She was referred to hospital medicine and will be admitted for further evaluation and management.     Overview/Hospital Course:  Started on IV fluid, pain medication with Benadryl, treating sickle cell pain crisis.  She is continued on folic acid.  She reports vaginal yeast infection, has received Diflucan orally, and started topical clotrimazole. Symptoms improving. INR daily as she is on Coumadin. 6/19 - stop fluids due to bloating. Left axilla round lesion - US. Discussed with radiologist on call - edema within fat tissues, no intervention  needed. PT/OT.     Interval History: Still with pain in right leg but hoping to discharge tomorrow.     Review of Systems   Constitutional:  Negative for diaphoresis and fever.   Respiratory:  Negative for cough and shortness of breath.    Cardiovascular:  Negative for chest pain and palpitations.   Gastrointestinal:  Negative for abdominal pain and nausea.   Genitourinary:  Negative for dysuria.        Vaginal itching   Musculoskeletal:  Positive for arthralgias and myalgias. Negative for gait problem.   Skin:  Negative for color change.   Neurological:  Negative for headaches.   Psychiatric/Behavioral:  Negative for agitation.      Objective:     Vital Signs (Most Recent):  Temp: 98.3 °F (36.8 °C) (06/20/25 0737)  Pulse: 80 (06/20/25 0737)  Resp: 14 (06/20/25 0802)  BP: (!) 156/68 (06/20/25 0737)  SpO2: (!) 94 % (06/20/25 0737) Vital Signs (24h Range):  Temp:  [98 °F (36.7 °C)-99 °F (37.2 °C)] 98.3 °F (36.8 °C)  Pulse:  [76-96] 80  Resp:  [14-20] 14  SpO2:  [94 %-98 %] 94 %  BP: (108-156)/(59-84) 156/68     Weight: 99.8 kg (220 lb 0.3 oz)  Body mass index is 40.24 kg/m².    Intake/Output Summary (Last 24 hours) at 6/20/2025 1032  Last data filed at 6/20/2025 0551  Gross per 24 hour   Intake 240 ml   Output 0 ml   Net 240 ml         Physical Exam  Constitutional:       General: She is not in acute distress.  Pulmonary:      Effort: No respiratory distress.      Breath sounds: No wheezing.   Abdominal:      General: There is no distension.      Tenderness: There is no abdominal tenderness.   Musculoskeletal:      Right lower leg: No edema.      Left lower leg: No edema.   Skin:     Comments: Port in place    Neurological:      General: No focal deficit present.      Mental Status: She is oriented to person, place, and time.               Significant Labs: All pertinent labs within the past 24 hours have been reviewed.    Significant Imaging: I have reviewed all pertinent imaging results/findings within the past 24  hours.      Assessment & Plan  Vaso-occlusive pain due to sickle cell disease  Hx Avascular Necrosis    Patient presenting with bilateral hip pain consistent with sickle cell.  She reports worsening hip pain and is requesting hip x-ray.  Denies any falls or trauma.  Patient was recently admitted to [facility] for acute encephalopathy and sickle cell related pain and left AMA on 6/11/25.  She is followed by Dr. Soni outpatient.  Patient has received exchange transfusions in the past and per chart review used to get them regularly when she lived in [place].  Her most recent transfusion was performed 05/23/2025.  Reticulocytes 4.1% and hemoglobin 9.2    - Continue adjunct pain control with acetaminophen 1000mg PO q8hr, gabapentin at 900mg PO q8hr.  - Continue morphine 30mg PO q8hr, oxycodone 10mg PO q4hr PRN, hydromorphone at 3mg IV q3hr PRN, diphenhydramine 25mg IV q3hr PRN. Tizanidine 4mg PRN  - Continue IVFs with LR - stopped for bloating .  - Monitor CBC   - continue folic acid  - Supplemental oxygen    - 6/18 - Still with severe pain in left hip. Limping to bathroom. Left hip xray - 'No evidence of acute displaced fracture, dislocation, or osseous destructive process. Hip joint spaces appear fairly well maintained.' Cont tx for sickle crisis. 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  - Diflucan 150 mg p.o. once.  - topical clotrimazole per patient request.  - monitor.    VTE Risk Mitigation (From admission, onward)           Ordered     warfarin (COUMADIN) tablet 4 mg  Daily         06/16/25 1253     IP VTE HIGH RISK PATIENT  Once         06/15/25 1636     Place sequential compression device  Until discontinued         06/15/25 1636     Reason for No Pharmacological VTE Prophylaxis  Once        Question:  Reasons:  Answer:  Already adequately anticoagulated on oral Anticoagulants    06/15/25 1636                    Discharge Planning   ALYSE: 6/24/2025     Code Status: Full Code   Patient is Medically Not Yet Ready for discharge.  Medical Readiness for Discharge Date:   Discharge Plan A: Home with family, Home Health          SDOH Screening:  The patient declined to be screened for utility difficulties, food insecurity, transport difficulties, housing insecurity, and interpersonal safety, so no concerns could be identified this admission.                      Sherine Foreman MD  Department of Hospital Medicine   [facility] - Med Surg ([address])

## 2025-06-20 NOTE — SUBJECTIVE & OBJECTIVE
Interval History: Still with pain in right leg but hoping to discharge tomorrow.     Review of Systems   Constitutional:  Negative for diaphoresis and fever.   Respiratory:  Negative for cough and shortness of breath.    Cardiovascular:  Negative for chest pain and palpitations.   Gastrointestinal:  Negative for abdominal pain and nausea.   Genitourinary:  Negative for dysuria.        Vaginal itching   Musculoskeletal:  Positive for arthralgias and myalgias. Negative for gait problem.   Skin:  Negative for color change.   Neurological:  Negative for headaches.   Psychiatric/Behavioral:  Negative for agitation.      Objective:     Vital Signs (Most Recent):  Temp: 98.3 °F (36.8 °C) (06/20/25 0737)  Pulse: 80 (06/20/25 0737)  Resp: 14 (06/20/25 0802)  BP: (!) 156/68 (06/20/25 0737)  SpO2: (!) 94 % (06/20/25 0737) Vital Signs (24h Range):  Temp:  [98 °F (36.7 °C)-99 °F (37.2 °C)] 98.3 °F (36.8 °C)  Pulse:  [76-96] 80  Resp:  [14-20] 14  SpO2:  [94 %-98 %] 94 %  BP: (108-156)/(59-84) 156/68     Weight: 99.8 kg (220 lb 0.3 oz)  Body mass index is 40.24 kg/m².    Intake/Output Summary (Last 24 hours) at 6/20/2025 1032  Last data filed at 6/20/2025 0551  Gross per 24 hour   Intake 240 ml   Output 0 ml   Net 240 ml         Physical Exam  Constitutional:       General: She is not in acute distress.  Pulmonary:      Effort: No respiratory distress.      Breath sounds: No wheezing.   Abdominal:      General: There is no distension.      Tenderness: There is no abdominal tenderness.   Musculoskeletal:      Right lower leg: No edema.      Left lower leg: No edema.   Skin:     Comments: Port in place    Neurological:      General: No focal deficit present.      Mental Status: She is oriented to person, place, and time.               Significant Labs: All pertinent labs within the past 24 hours have been reviewed.    Significant Imaging: I have reviewed all pertinent imaging results/findings within the past 24 hours.

## 2025-06-20 NOTE — PLAN OF CARE
06/20/25 1237   Discharge Reassessment   Assessment Type Discharge Planning Reassessment   Did the patient's condition or plan change since previous assessment? No   Discharge Plan discussed with: Patient   Communicated ALYSE with patient/caregiver Date not available/Unable to determine   Discharge Plan A Home with family;Home Health   Discharge Plan B Home with family   DME Needed Upon Discharge  none   Transition of Care Barriers None   Why the patient remains in the hospital Requires continued medical care   Post-Acute Status   Discharge Delays None known at this time     Patient still being medically treated. Patient may discharge tomorrow.

## 2025-06-20 NOTE — PLAN OF CARE
Plan of care reviewed with patient.  Patient verbalized understanding and had no further questions.  Patient continues to receive IV analgesics around the clock.  Patient now resting comfortably in bed locked in lowest position, side rails up x3, and call bell in reach.  Will continue to monitor.       Problem: Adult Inpatient Plan of Care  Goal: Patient-Specific Goal (Individualized)  Outcome: Progressing  Goal: Absence of Hospital-Acquired Illness or Injury  Outcome: Progressing  Goal: Optimal Comfort and Wellbeing  Outcome: Progressing  Goal: Readiness for Transition of Care  Outcome: Progressing     Problem: Bariatric Environmental Safety  Goal: Safety Maintained with Care  Outcome: Progressing     Problem: Infection  Goal: Absence of Infection Signs and Symptoms  Outcome: Progressing     Problem: Pain Acute  Goal: Optimal Pain Control and Function  Outcome: Progressing     Problem: Sickle Cell Disease  Goal: Optimal Cerebral Tissue Perfusion  Outcome: Progressing  Goal: Optimal Coping with Sickle Cell Disease  Outcome: Progressing  Goal: Effective Tissue Perfusion  Outcome: Progressing  Goal: Absence of Infection Signs and Symptoms  Outcome: Progressing  Goal: Optimal Pain Control and Function  Outcome: Progressing     Problem: Wound  Goal: Optimal Coping  Outcome: Progressing  Goal: Optimal Functional Ability  Outcome: Progressing  Goal: Absence of Infection Signs and Symptoms  Outcome: Progressing  Goal: Improved Oral Intake  Outcome: Progressing  Goal: Optimal Pain Control and Function  Outcome: Progressing  Goal: Skin Health and Integrity  Outcome: Progressing  Goal: Optimal Wound Healing  Outcome: Progressing

## 2025-06-21 VITALS
DIASTOLIC BLOOD PRESSURE: 70 MMHG | RESPIRATION RATE: 18 BRPM | TEMPERATURE: 99 F | WEIGHT: 220 LBS | HEIGHT: 62 IN | HEART RATE: 71 BPM | OXYGEN SATURATION: 96 % | SYSTOLIC BLOOD PRESSURE: 121 MMHG | BODY MASS INDEX: 40.48 KG/M2

## 2025-06-21 LAB
ERYTHROCYTE [DISTWIDTH] IN BLOOD BY AUTOMATED COUNT: 25.9 % (ref 11.5–14.5)
HCT VFR BLD AUTO: 24.5 % (ref 37–48.5)
HGB BLD-MCNC: 8 GM/DL (ref 12–16)
INR PPP: 2.5 (ref 0.8–1.2)
MCH RBC QN AUTO: 24.7 PG (ref 27–31)
MCHC RBC AUTO-ENTMCNC: 32.7 G/DL (ref 32–36)
MCV RBC AUTO: 76 FL (ref 82–98)
PLATELET # BLD AUTO: 378 K/UL (ref 150–450)
PMV BLD AUTO: 8.9 FL (ref 9.2–12.9)
PROTHROMBIN TIME: 26.7 SECONDS (ref 9–12.5)
RBC # BLD AUTO: 3.24 M/UL (ref 4–5.4)
WBC # BLD AUTO: 7.71 K/UL (ref 3.9–12.7)

## 2025-06-21 PROCEDURE — 94761 N-INVAS EAR/PLS OXIMETRY MLT: CPT

## 2025-06-21 PROCEDURE — 25000003 PHARM REV CODE 250: Performed by: NURSE PRACTITIONER

## 2025-06-21 PROCEDURE — 85027 COMPLETE CBC AUTOMATED: CPT | Performed by: STUDENT IN AN ORGANIZED HEALTH CARE EDUCATION/TRAINING PROGRAM

## 2025-06-21 PROCEDURE — 63600175 PHARM REV CODE 636 W HCPCS: Performed by: NURSE PRACTITIONER

## 2025-06-21 PROCEDURE — 25000003 PHARM REV CODE 250: Performed by: HOSPITALIST

## 2025-06-21 PROCEDURE — 63600175 PHARM REV CODE 636 W HCPCS: Performed by: STUDENT IN AN ORGANIZED HEALTH CARE EDUCATION/TRAINING PROGRAM

## 2025-06-21 PROCEDURE — 85610 PROTHROMBIN TIME: CPT | Performed by: HOSPITALIST

## 2025-06-21 RX ORDER — HEPARIN 100 UNIT/ML
100 SYRINGE INTRAVENOUS ONCE
Status: COMPLETED | OUTPATIENT
Start: 2025-06-21 | End: 2025-06-21

## 2025-06-21 RX ORDER — TIZANIDINE 4 MG/1
4 TABLET ORAL EVERY 8 HOURS PRN
Qty: 90 TABLET | Refills: 0 | Status: ON HOLD | OUTPATIENT
Start: 2025-06-21 | End: 2025-07-21

## 2025-06-21 RX ORDER — HEPARIN SODIUM (PORCINE) LOCK FLUSH IV SOLN 100 UNIT/ML 100 UNIT/ML
100 SOLUTION INTRAVENOUS ONCE
Status: DISCONTINUED | OUTPATIENT
Start: 2025-06-21 | End: 2025-06-21 | Stop reason: HOSPADM

## 2025-06-21 RX ADMIN — DIPHENHYDRAMINE HYDROCHLORIDE 25 MG: 50 INJECTION, SOLUTION INTRAMUSCULAR; INTRAVENOUS at 12:06

## 2025-06-21 RX ADMIN — DIPHENHYDRAMINE HYDROCHLORIDE 25 MG: 50 INJECTION, SOLUTION INTRAMUSCULAR; INTRAVENOUS at 03:06

## 2025-06-21 RX ADMIN — DIPHENHYDRAMINE HYDROCHLORIDE 25 MG: 50 INJECTION, SOLUTION INTRAMUSCULAR; INTRAVENOUS at 06:06

## 2025-06-21 RX ADMIN — TIZANIDINE 4 MG: 4 TABLET ORAL at 05:06

## 2025-06-21 RX ADMIN — HEPARIN 100 UNITS: 100 SYRINGE at 03:06

## 2025-06-21 RX ADMIN — MORPHINE SULFATE 30 MG: 15 TABLET, FILM COATED, EXTENDED RELEASE ORAL at 09:06

## 2025-06-21 RX ADMIN — GABAPENTIN 900 MG: 300 CAPSULE ORAL at 03:06

## 2025-06-21 RX ADMIN — ACETAMINOPHEN 1000 MG: 500 TABLET, FILM COATED ORAL at 05:06

## 2025-06-21 RX ADMIN — FLUOXETINE HYDROCHLORIDE 80 MG: 20 CAPSULE ORAL at 09:06

## 2025-06-21 RX ADMIN — HYDROMORPHONE HYDROCHLORIDE 3 MG: 1 INJECTION, SOLUTION INTRAMUSCULAR; INTRAVENOUS; SUBCUTANEOUS at 09:06

## 2025-06-21 RX ADMIN — ACETAMINOPHEN 1000 MG: 500 TABLET, FILM COATED ORAL at 03:06

## 2025-06-21 RX ADMIN — OXYCODONE HYDROCHLORIDE AND ACETAMINOPHEN 1000 MG: 500 TABLET ORAL at 09:06

## 2025-06-21 RX ADMIN — HYDROMORPHONE HYDROCHLORIDE 3 MG: 1 INJECTION, SOLUTION INTRAMUSCULAR; INTRAVENOUS; SUBCUTANEOUS at 06:06

## 2025-06-21 RX ADMIN — DIPHENHYDRAMINE HYDROCHLORIDE 25 MG: 50 INJECTION, SOLUTION INTRAMUSCULAR; INTRAVENOUS at 09:06

## 2025-06-21 RX ADMIN — AMLODIPINE BESYLATE 10 MG: 5 TABLET ORAL at 09:06

## 2025-06-21 RX ADMIN — HYDROMORPHONE HYDROCHLORIDE 3 MG: 1 INJECTION, SOLUTION INTRAMUSCULAR; INTRAVENOUS; SUBCUTANEOUS at 03:06

## 2025-06-21 RX ADMIN — GABAPENTIN 900 MG: 300 CAPSULE ORAL at 09:06

## 2025-06-21 RX ADMIN — PANTOPRAZOLE SODIUM 40 MG: 40 TABLET, DELAYED RELEASE ORAL at 09:06

## 2025-06-21 RX ADMIN — HYDROMORPHONE HYDROCHLORIDE 3 MG: 1 INJECTION, SOLUTION INTRAMUSCULAR; INTRAVENOUS; SUBCUTANEOUS at 12:06

## 2025-06-21 RX ADMIN — TIZANIDINE 4 MG: 4 TABLET ORAL at 12:06

## 2025-06-21 RX ADMIN — FOLIC ACID 1 MG: 1 TABLET ORAL at 09:06

## 2025-06-21 RX ADMIN — HYDROCODONE BITARTRATE AND ACETAMINOPHEN 1 TABLET: 10; 325 TABLET ORAL at 12:06

## 2025-06-21 NOTE — DISCHARGE SUMMARY
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 She states she has been taking her prescribed medications with minimal relief and her pain worsened last night prompting her to come to ED.  Patient states she was admitted at [facility] for encephalopathy but left against medical advice due to not liking in the way she was treated.  Patient denies chest pain, vomiting fever and chills. She reports shortness of breath that occurs secondary to pain.  She is followed by Dr Soni, Hem/Onc.     ED work up significant for hemoglobin 9.2, sodium 147, reticulocytes 4.1%. Chest xray showed symmetrically expanded lungs with few coarse interstitial markings. Patient received 1L IV NS and IV dilaudid in the ED with minimal improvement. She was referred to hospital medicine and will be admitted for further evaluation and management.       Hospital Course:   Started on IV fluid, pain medication with Benadryl, treating sickle cell pain crisis.  She is continued on folic acid.  She reports vaginal yeast infection, has received Diflucan orally, and started topical tx. Symptoms improving. INR daily as she is on Coumadin. 6/19 - stop fluids due to bloating. Left axilla round  lesion - US. Discussed with radiologist on call - edema within fat tissues, no intervention needed. PT/OT - HH. Pain improved and stable for discharge.      Goals of Care Treatment Preferences:  Code Status: Full Code      Final Active Diagnoses:    Diagnosis Date Noted POA    PRINCIPAL PROBLEM:  Vaso-occlusive pain due to sickle cell disease [D57.00] 04/16/2017 Yes    Vaginal yeast infection [B37.31] 06/16/2025 Yes    Asthma [J45.909] 06/15/2025 Yes    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 [I82.C22] 04/05/2025 Yes     Chronic    MDD (major depressive disorder), recurrent episode [F33.9] 02/27/2025 Yes    Hypertension [I10] 04/16/2017 Yes     Chronic      Problems Resolved During this Admission:       Discharged Condition: good    Disposition: Home health     Follow Up:   Contact information for after-discharge care       Home Medical Care       [facility] Follow up on 6/22/2025.    Service: Home Nursing  Why: They will contact you for home healthcare appointments.  Contact information:  [address]  [facility] [id number] [phone]                                 Medications:  Reconciled Home Medications:      Medication List        START taking these medications      tiZANidine 4 MG tablet  Commonly known as: ZANAFLEX  Take 1 tablet (4 mg total) by mouth every 8 (eight) hours as needed.            CHANGE how you take these medications      warfarin 1 MG tablet  Commonly known as: COUMADIN  Take 3 tablets (3 mg total) by mouth Daily.  What changed: Another medication with the same name was removed. Continue taking this medication, and follow the directions you see here.            CONTINUE taking these medications      acetaminophen 325 MG tablet  Commonly known as: TYLENOL  Take 2 tablets (650 mg total) by mouth every 8 (eight) hours as needed for Pain.     albuterol 90 mcg/actuation inhaler  Commonly known as: VENTOLIN HFA  Inhale 2 puffs into the lungs  every 6 (six) hours as needed for Wheezing or Shortness of Breath. Rescue     amLODIPine 10 MG tablet  Commonly known as: NORVASC  Take 1 tablet (10 mg total) by mouth once daily.     dicyclomine 10 MG capsule  Commonly known as: BENTYL  Take 10 mg by mouth daily as needed.     FLUoxetine 40 MG capsule  Take 2 capsules (80 mg total) by mouth once daily.     fluticasone propionate 50 mcg/actuation nasal spray  Commonly known as: FLONASE  1 spray (50 mcg total) by Each Nostril route daily as needed for Allergies.     folic acid 1 MG tablet  Commonly known as: FOLVITE  Take 1 tablet (1 mg total) by mouth once daily.     gabapentin 300 MG capsule  Commonly known as: NEURONTIN  Take 2-3 capsules (600-900 mg total) by mouth 3 (three) times daily.     HYDROcodone-acetaminophen  mg per tablet  Commonly known as: NORCO  Take 1 tablet by mouth every 6 (six) hours as needed for Pain.     morphine 30 MG 12 hr tablet  Commonly known as: MS CONTIN  Take 1 tablet (30 mg total) by mouth every 12 (twelve) hours.     naloxone 4 mg/actuation Spry  Commonly known as: NARCAN  4mg by nasal route as needed for opioid overdose; may repeat every 2-3 minutes in alternating nostrils until medical help arrives. Call 911     pantoprazole 40 MG tablet  Commonly known as: PROTONIX  Take 1 tablet (40 mg total) by mouth once daily.     prazosin 1 MG Cap  Commonly known as: MINIPRESS  Take 1 capsule (1 mg total) by mouth every evening.     senna-docusate 8.6-50 mg per tablet  Commonly known as: PERICOLACE  Take 1 tablet by mouth daily as needed for Constipation.     VITAMIN C ORAL  Take 1 capsule by mouth Daily.              Indwelling Lines/Drains at time of discharge:   Lines/Drains/Airways       Central Venous Catheter Line  Duration             Port A Cath Single Lumen 02/10/25 1200 Atrial Right 131 days                        Time spent on the discharge of patient: 35 minutes         Sherine Foreman MD  Department of Hospital  [facility] - Med Surg ([address])

## 2025-06-21 NOTE — PLAN OF CARE
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       Follow Up Appointments:  Future Appointments   Date Time Provider Department Center   7/1/2025  7:40 AM Vijaya Soni MD [facility]   7/7/2025 11:20 [facility]       Allergies:  Review of patient's allergies indicates:   Allergen Reactions    Cat dander Anaphylaxis, Itching and Shortness Of Breath    Nsaids (non-steroidal anti-inflammatory drug) Anaphylaxis, Itching and Shortness Of Breath    Latex Rash       Medications: Review discharge medications with patient and family and provide education.    Current Medications[1]     Medication List        START taking these medications      tiZANidine 4 MG tablet  Commonly known as: ZANAFLEX  Take 1 tablet (4 mg total) by mouth every 8 (eight) hours as needed.            CHANGE how you take these medications      warfarin 1 MG tablet  Commonly known as: COUMADIN  Take 3 tablets (3 mg total) by mouth Daily.  What changed: Another medication with the same name was removed. Continue taking this medication, and follow the directions you see here.            CONTINUE taking these medications      acetaminophen 325 MG tablet  Commonly known as: TYLENOL  Take 2 tablets (650 mg total) by mouth every 8 (eight) hours  as needed for Pain.     albuterol 90 mcg/actuation inhaler  Commonly known as: VENTOLIN HFA  Inhale 2 puffs into the lungs every 6 (six) hours as needed for Wheezing or Shortness of Breath. Rescue     amLODIPine 10 MG tablet  Commonly known as: NORVASC  Take 1 tablet (10 mg total) by mouth once daily.     dicyclomine 10 MG capsule  Commonly known as: BENTYL  Take 10 mg by mouth daily as needed.     FLUoxetine 40 MG capsule  Take 2 capsules (80 mg total) by mouth once daily.     fluticasone propionate 50 mcg/actuation nasal spray  Commonly known as: FLONASE  1 spray (50 mcg total) by Each Nostril route daily as needed for Allergies.     folic acid 1 MG tablet  Commonly known as: FOLVITE  Take 1 tablet (1 mg total) by mouth once daily.     gabapentin 300 MG capsule  Commonly known as: NEURONTIN  Take 2-3 capsules (600-900 mg total) by mouth 3 (three) times daily.     HYDROcodone-acetaminophen  mg per tablet  Commonly known as: NORCO  Take 1 tablet by mouth every 6 (six) hours as needed for Pain.     morphine 30 MG 12 hr tablet  Commonly known as: MS CONTIN  Take 1 tablet (30 mg total) by mouth every 12 (twelve) hours.     naloxone 4 mg/actuation Spry  Commonly known as: NARCAN  4mg by nasal route as needed for opioid overdose; may repeat every 2-3 minutes in alternating nostrils until medical help arrives. Call 911     pantoprazole 40 MG tablet  Commonly known as: PROTONIX  Take 1 tablet (40 mg total) by mouth once daily.     prazosin 1 MG Cap  Commonly known as: MINIPRESS  Take 1 capsule (1 mg total) by mouth every evening.     senna-docusate 8.6-50 mg per tablet  Commonly known as: PERICOLACE  Take 1 tablet by mouth daily as needed for Constipation.     VITAMIN C ORAL  Take 1 capsule by mouth Daily.                I have seen and examined this patient within the last 30 days. My clinical findings that support the need for the home health skilled services and home bound status are the  following:no   Weakness/numbness causing balance and gait disturbance due to Weakness/Debility making it taxing to leave home.     Diet:   regular diet        Referrals/ Consults  Physical Therapy to evaluate and treat. Evaluate for home safety and equipment needs; Establish/upgrade home exercise program. Perform / instruct on therapeutic exercises, gait training, transfer training, and Range of Motion.  Occupational Therapy to evaluate and treat. Evaluate home environment for safety and equipment needs. Perform/Instruct on transfers, ADL training, ROM, and therapeutic exercises.    Activities:   activity as tolerated    Nursing:   Agency to admit patient within 24 hours of hospital discharge unless specified on physician order or at patient request    SN to complete comprehensive assessment including routine vital signs. Instruct on disease process and s/s of complications to report to MD. Review/verify medication list sent home with the patient at time of discharge  and instruct patient/caregiver as needed. Frequency may be adjusted depending on start of care date.     Skilled nurse to perform up to 3 visits PRN for symptoms related to diagnosis    Notify MD if SBP > 160 or < 90; DBP > 90 or < 50; HR > 120 or < 50; Temp > 101; O2 < 88%    Ok to schedule additional visits based on staff availability and patient request on consecutive days within the home health episode.    When multiple disciplines ordered:    Start of Care occurs on Sunday - Wednesday schedule remaining discipline evaluations as ordered on separate consecutive days following the start of care.    Thursday SOC -schedule subsequent evaluations Friday and Monday the following week.     Friday - Saturday SOC - schedule subsequent discipline evaluations on consecutive days starting Monday of the following week.    For all post-discharge communication and subsequent orders please contact patient's primary care physician.       Home Health  Aide:  Physical Therapy Three times weekly and Occupational Therapy Three times weekly        I certify that this patient is confined to her home and needs physical therapy and occupational therapy.                [1]   Current Facility-Administered Medications   Medication Dose Route Frequency Provider Last Rate Last Admin    acetaminophen tablet 1,000 mg  1,000 mg Oral Q8H Sara Bryant NP   1,000 mg at 06/21/25 0514    acetaminophen tablet 650 mg  650 mg Oral Q8H PRN Sara Bryant NP        amLODIPine tablet 10 mg  10 mg Oral Daily Sara Bryant NP   10 mg at 06/21/25 0935    ascorbic acid (vitamin C) tablet 1,000 mg  1,000 mg Oral Daily Sara Bryant NP   1,000 mg at 06/21/25 0934    clotrimazole 1 % vaginal cream 1 applicator  1 applicator Vaginal Daily Glynn Palomares MD   1 applicator at 06/20/25 1651    diphenhydrAMINE injection 25 mg  25 mg Intravenous Q3H PRN Sara Bryant NP   25 mg at 06/21/25 0934    FLUoxetine capsule 80 mg  80 mg Oral Daily Sara Bryant NP   80 mg at 06/21/25 0935    folic acid tablet 1 mg  1 mg Oral Daily Sara Bryant NP   1 mg at 06/21/25 0935    gabapentin capsule 900 mg  900 mg Oral TID Sara Bryant NP   900 mg at 06/21/25 0935    HYDROcodone-acetaminophen  mg per tablet 1 tablet  1 tablet Oral Q6H PRN Glynn Palomares MD   1 tablet at 06/16/25 2204    HYDROmorphone injection 3 mg  3 mg Intravenous Q3H PRN Sherine Meier MD   3 mg at 06/21/25 0935    lactulose 20 gram/30 mL solution Soln 15 g  15 g Oral BID Glynn Palomares MD   15 g at 06/20/25 2024    melatonin tablet 6 mg  6 mg Oral Nightly PRN Karla Thomas NP   6 mg at 06/20/25 2024    miconazole nitrate 2% ointment   Topical (Top) BID Sherine Meier MD   Given at 06/20/25 2024    morphine 12 hr tablet 30 mg  30 mg Oral Q12H Sara Bryant NP   30 mg at 06/21/25 0934    naloxone 0.4 mg/mL injection 0.02 mg  0.02 mg Intravenous PRN Sara Bryant, NP         ondansetron injection 4 mg  4 mg Intravenous Q6H PRN Sara Bryant NP   4 mg at 06/19/25 2229    pantoprazole EC tablet 40 mg  40 mg Oral Daily Sara Bryant NP   40 mg at 06/21/25 0935    prazosin capsule 1 mg  1 mg Oral QHS Sara Bryant NP   1 mg at 06/20/25 2023    promethazine (PHENERGAN) 12.5 mg in 0.9% NaCl 50 mL IVPB  12.5 mg Intravenous Q6H PRN Sara Bryant NP        senna-docusate 8.6-50 mg per tablet 2 tablet  2 tablet Oral QHS Glynn Palomares MD   2 tablet at 06/20/25 2023    sodium chloride 0.9% flush 10 mL  10 mL Intravenous Q8H PRN Sara Bryant NP        tiZANidine tablet 4 mg  4 mg Oral Q8H PRN Sara Bryant NP   4 mg at 06/21/25 0514    warfarin (COUMADIN) tablet 4 mg  4 mg Oral Daily Glynn Palomares MD   4 mg at 06/20/25 3842

## 2025-06-21 NOTE — PLAN OF CARE
Case Management Final Discharge Note    Discharge Disposition: Home with HH    New DME ordered / company name: NA    Relevant SDOH / Transition of Care Barriers:  transportation     Person available to provide assistance at home when needed and their contact information: Self    Scheduled followup appointment: Heme    Referrals placed: NA    Transportation: Patient needs a lyft     Patient educated on discharge services and updated on DC plan. Bedside RN notified, patient clear to discharge from Case Management Perspective.   [facility] - Med Surg ([address])  Discharge Final Note    Primary Care Provider: No, Primary Doctor    Expected Discharge Date: 6/21/2025    Final Discharge Note (most recent)       Final Note - 06/21/25 1000          Final Note    Assessment Type Final Discharge Note     Anticipated Discharge Disposition Home or Self Care     Hospital Resources/Appts/Education Provided Provided patient/caregiver with written discharge plan information;Appointments scheduled and added to AVS        Post-Acute Status    Post-Acute Authorization Home Health (P)      Home Health Status Set-up Complete/Auth obtained (P)      Discharge Delays None known at this time (P)

## 2025-06-23 ENCOUNTER — ANTI-COAG VISIT (OUTPATIENT)
Dept: CARDIOLOGY | Facility: CLINIC | Age: 45
End: 2025-06-23
Payer: MEDICARE

## 2025-06-23 DIAGNOSIS — Z86.711 HISTORY OF PULMONARY EMBOLISM: Chronic | ICD-10-CM

## 2025-06-23 DIAGNOSIS — Z79.01 WARFARIN ANTICOAGULATION: Primary | ICD-10-CM

## 2025-06-23 RX ORDER — WARFARIN 4 MG/1
4 TABLET ORAL DAILY
Qty: 35 TABLET | Refills: 3 | Status: ON HOLD | OUTPATIENT
Start: 2025-06-23 | End: 2025-11-10

## 2025-06-23 NOTE — PROGRESS NOTES
Spoke with pt who confirmed they sent her home with instructions to take 3mg daily. She said she took 3mg on 6/21 and 6/22, pharmacy updated in chart to [facility].

## 2025-06-23 NOTE — PROGRESS NOTES
[facility]  Hospital Discharge Follow-Up    Nazanin Malone was recently discharged from an inpatient facility and is resuming care with the [facility].       Hospital Notes per Chart Review: Started on IV fluid, pain medication with Benadryl, treating sickle cell pain crisis.  She is continued on folic acid.  She reports vaginal yeast infection, has received Diflucan orally, and started topical tx. Symptoms improving. INR daily as she is on Coumadin. 6/19 - stop fluids due to bloating. Left axilla round lesion - US. Discussed with radiologist on call - edema within fat tissues, no intervention needed. PT/OT - HH. Pain improved and stable for discharge      Anticoagulation Clinic Plan: Patient took 4mg daily while admitted and achieved an therapeutic INR. Will continue 4mg daily. Will need to send in 4mg tablets to pharmacy.      Follow-Up Action Needed: Determine which pharmacy patient would like tablets to go to.      Zoey Boyd, PharmD, BCPS  Clinical Pharmacist - [facility]  Phone: [phone] or Epic Secure Messaging

## 2025-06-25 NOTE — PROGRESS NOTES
6/25/25 Nikki with [facility] called to report Patient was discharged from the hospital 6/21 and HH service was not resumed, HH care was not resumed, unable to locate Patient or daughter to inform them of this, reports Patient needs to be scheduled in clinic

## 2025-06-28 ENCOUNTER — HOSPITAL ENCOUNTER (INPATIENT)
Facility: OTHER | Age: 45
LOS: 5 days | Discharge: HOME HEALTH | DRG: 812 | End: 2025-07-04
Attending: EMERGENCY MEDICINE | Admitting: HOSPITALIST
Payer: MEDICARE

## 2025-06-28 DIAGNOSIS — G89.29 OTHER CHRONIC PAIN: ICD-10-CM

## 2025-06-28 DIAGNOSIS — M79.606 LEG PAIN: Primary | ICD-10-CM

## 2025-06-28 DIAGNOSIS — Z98.890 H/O FASCIOTOMY: ICD-10-CM

## 2025-06-28 DIAGNOSIS — R07.9 CHEST PAIN: ICD-10-CM

## 2025-06-28 DIAGNOSIS — Z86.2 HISTORY OF SICKLE CELL ANEMIA: ICD-10-CM

## 2025-06-28 DIAGNOSIS — D57.00 HB-SS DISEASE WITH VASO-OCCLUSIVE PAIN: ICD-10-CM

## 2025-06-28 DIAGNOSIS — D57.1 SICKLE CELL ANEMIA: ICD-10-CM

## 2025-06-28 DIAGNOSIS — M17.11 PRIMARY OSTEOARTHRITIS OF RIGHT KNEE: ICD-10-CM

## 2025-06-28 LAB
ABSOLUTE EOSINOPHIL (OHS): 0.13 K/UL
ABSOLUTE MONOCYTE (OHS): 0.54 K/UL (ref 0.3–1)
ABSOLUTE NEUTROPHIL COUNT (OHS): 4.47 K/UL (ref 1.8–7.7)
ALBUMIN SERPL BCP-MCNC: 3.9 G/DL (ref 3.5–5.2)
ALP SERPL-CCNC: 93 UNIT/L (ref 40–150)
ALT SERPL W/O P-5'-P-CCNC: 16 UNIT/L (ref 10–44)
ANION GAP (OHS): 13 MMOL/L (ref 8–16)
AST SERPL-CCNC: 22 UNIT/L (ref 11–45)
BASOPHILS # BLD AUTO: 0.03 K/UL
BASOPHILS NFR BLD AUTO: 0.4 %
BILIRUB SERPL-MCNC: 0.8 MG/DL (ref 0.1–1)
BUN SERPL-MCNC: 7 MG/DL (ref 6–20)
CALCIUM SERPL-MCNC: 9.8 MG/DL (ref 8.7–10.5)
CHLORIDE SERPL-SCNC: 108 MMOL/L (ref 95–110)
CO2 SERPL-SCNC: 21 MMOL/L (ref 23–29)
CREAT SERPL-MCNC: 1 MG/DL (ref 0.5–1.4)
ERYTHROCYTE [DISTWIDTH] IN BLOOD BY AUTOMATED COUNT: 26.2 % (ref 11.5–14.5)
GFR SERPLBLD CREATININE-BSD FMLA CKD-EPI: >60 ML/MIN/1.73/M2
GLUCOSE SERPL-MCNC: 101 MG/DL (ref 70–110)
HCT VFR BLD AUTO: 31.7 % (ref 37–48.5)
HGB BLD-MCNC: 10.5 GM/DL (ref 12–16)
IMM GRANULOCYTES # BLD AUTO: 0.01 K/UL (ref 0–0.04)
IMM GRANULOCYTES NFR BLD AUTO: 0.1 % (ref 0–0.5)
INR PPP: 3 (ref 0.8–1.2)
LACTATE SERPL-SCNC: 0.6 MMOL/L (ref 0.5–2.2)
LIPASE SERPL-CCNC: 56 U/L (ref 4–60)
LYMPHOCYTES # BLD AUTO: 2.02 K/UL (ref 1–4.8)
MAGNESIUM SERPL-MCNC: 2 MG/DL (ref 1.6–2.6)
MCH RBC QN AUTO: 23.7 PG (ref 27–31)
MCHC RBC AUTO-ENTMCNC: 33.1 G/DL (ref 32–36)
MCV RBC AUTO: 72 FL (ref 82–98)
NUCLEATED RBC (/100WBC) (OHS): 0 /100 WBC
PLATELET # BLD AUTO: 417 K/UL (ref 150–450)
PMV BLD AUTO: 9.1 FL (ref 9.2–12.9)
POTASSIUM SERPL-SCNC: 3.4 MMOL/L (ref 3.5–5.1)
PROT SERPL-MCNC: 8.4 GM/DL (ref 6–8.4)
PROTHROMBIN TIME: 31.6 SECONDS (ref 9–12.5)
RBC # BLD AUTO: 4.43 M/UL (ref 4–5.4)
RELATIVE EOSINOPHIL (OHS): 1.8 %
RELATIVE LYMPHOCYTE (OHS): 28.1 % (ref 18–48)
RELATIVE MONOCYTE (OHS): 7.5 % (ref 4–15)
RELATIVE NEUTROPHIL (OHS): 62.1 % (ref 38–73)
RETICS/RBC NFR AUTO: 1.6 % (ref 0.5–2.5)
SODIUM SERPL-SCNC: 142 MMOL/L (ref 136–145)
WBC # BLD AUTO: 7.2 K/UL (ref 3.9–12.7)

## 2025-06-28 PROCEDURE — 25000003 PHARM REV CODE 250: Performed by: EMERGENCY MEDICINE

## 2025-06-28 PROCEDURE — G0378 HOSPITAL OBSERVATION PER HR: HCPCS

## 2025-06-28 PROCEDURE — 63600175 PHARM REV CODE 636 W HCPCS: Performed by: NURSE PRACTITIONER

## 2025-06-28 PROCEDURE — 85610 PROTHROMBIN TIME: CPT | Performed by: EMERGENCY MEDICINE

## 2025-06-28 PROCEDURE — 25000003 PHARM REV CODE 250: Performed by: NURSE PRACTITIONER

## 2025-06-28 PROCEDURE — 96374 THER/PROPH/DIAG INJ IV PUSH: CPT

## 2025-06-28 PROCEDURE — 83690 ASSAY OF LIPASE: CPT | Performed by: EMERGENCY MEDICINE

## 2025-06-28 PROCEDURE — 83735 ASSAY OF MAGNESIUM: CPT | Performed by: NURSE PRACTITIONER

## 2025-06-28 PROCEDURE — 99285 EMERGENCY DEPT VISIT HI MDM: CPT | Mod: 25

## 2025-06-28 PROCEDURE — 94761 N-INVAS EAR/PLS OXIMETRY MLT: CPT

## 2025-06-28 PROCEDURE — 96376 TX/PRO/DX INJ SAME DRUG ADON: CPT

## 2025-06-28 PROCEDURE — 96375 TX/PRO/DX INJ NEW DRUG ADDON: CPT

## 2025-06-28 PROCEDURE — 83605 ASSAY OF LACTIC ACID: CPT | Performed by: EMERGENCY MEDICINE

## 2025-06-28 PROCEDURE — 85045 AUTOMATED RETICULOCYTE COUNT: CPT | Performed by: EMERGENCY MEDICINE

## 2025-06-28 PROCEDURE — 63600175 PHARM REV CODE 636 W HCPCS: Performed by: EMERGENCY MEDICINE

## 2025-06-28 PROCEDURE — 85025 COMPLETE CBC W/AUTO DIFF WBC: CPT | Performed by: EMERGENCY MEDICINE

## 2025-06-28 PROCEDURE — 80053 COMPREHEN METABOLIC PANEL: CPT | Performed by: EMERGENCY MEDICINE

## 2025-06-28 RX ORDER — OXYCODONE HYDROCHLORIDE 5 MG/1
5 TABLET ORAL EVERY 4 HOURS PRN
Status: DISCONTINUED | OUTPATIENT
Start: 2025-06-28 | End: 2025-07-04 | Stop reason: HOSPADM

## 2025-06-28 RX ORDER — AMOXICILLIN 250 MG
1 CAPSULE ORAL DAILY PRN
Status: DISCONTINUED | OUTPATIENT
Start: 2025-06-28 | End: 2025-07-02

## 2025-06-28 RX ORDER — SODIUM CHLORIDE 0.9 % (FLUSH) 0.9 %
10 SYRINGE (ML) INJECTION
Status: DISCONTINUED | OUTPATIENT
Start: 2025-06-28 | End: 2025-06-28

## 2025-06-28 RX ORDER — NALOXONE HCL 0.4 MG/ML
0.02 VIAL (ML) INJECTION
Status: DISCONTINUED | OUTPATIENT
Start: 2025-06-28 | End: 2025-07-04 | Stop reason: HOSPADM

## 2025-06-28 RX ORDER — ASCORBIC ACID 500 MG
500 TABLET ORAL DAILY
Status: DISCONTINUED | OUTPATIENT
Start: 2025-06-29 | End: 2025-07-04 | Stop reason: HOSPADM

## 2025-06-28 RX ORDER — ALBUTEROL SULFATE 90 UG/1
2 INHALANT RESPIRATORY (INHALATION) EVERY 6 HOURS PRN
Status: DISCONTINUED | OUTPATIENT
Start: 2025-06-28 | End: 2025-07-04 | Stop reason: HOSPADM

## 2025-06-28 RX ORDER — FOLIC ACID 1 MG/1
1 TABLET ORAL DAILY
Status: DISCONTINUED | OUTPATIENT
Start: 2025-06-29 | End: 2025-07-04 | Stop reason: HOSPADM

## 2025-06-28 RX ORDER — DIPHENHYDRAMINE HYDROCHLORIDE 50 MG/ML
25 INJECTION, SOLUTION INTRAMUSCULAR; INTRAVENOUS
Status: COMPLETED | OUTPATIENT
Start: 2025-06-28 | End: 2025-06-28

## 2025-06-28 RX ORDER — DIPHENHYDRAMINE HYDROCHLORIDE 50 MG/ML
25 INJECTION, SOLUTION INTRAMUSCULAR; INTRAVENOUS EVERY 4 HOURS PRN
Status: DISCONTINUED | OUTPATIENT
Start: 2025-06-28 | End: 2025-06-29

## 2025-06-28 RX ORDER — WARFARIN 2 MG/1
4 TABLET ORAL DAILY
Status: DISCONTINUED | OUTPATIENT
Start: 2025-06-28 | End: 2025-06-29

## 2025-06-28 RX ORDER — ACETAMINOPHEN 325 MG/1
650 TABLET ORAL EVERY 4 HOURS PRN
Status: DISCONTINUED | OUTPATIENT
Start: 2025-06-28 | End: 2025-07-04 | Stop reason: HOSPADM

## 2025-06-28 RX ORDER — TIZANIDINE 4 MG/1
4 TABLET ORAL EVERY 8 HOURS PRN
Status: DISCONTINUED | OUTPATIENT
Start: 2025-06-28 | End: 2025-07-04 | Stop reason: HOSPADM

## 2025-06-28 RX ORDER — POLYETHYLENE GLYCOL 3350 17 G/17G
17 POWDER, FOR SOLUTION ORAL DAILY
Status: DISCONTINUED | OUTPATIENT
Start: 2025-06-29 | End: 2025-07-02

## 2025-06-28 RX ORDER — TALC
6 POWDER (GRAM) TOPICAL NIGHTLY PRN
Status: DISCONTINUED | OUTPATIENT
Start: 2025-06-28 | End: 2025-07-04 | Stop reason: HOSPADM

## 2025-06-28 RX ORDER — HYDROMORPHONE HYDROCHLORIDE 1 MG/ML
1 INJECTION, SOLUTION INTRAMUSCULAR; INTRAVENOUS; SUBCUTANEOUS EVERY 4 HOURS PRN
Status: DISCONTINUED | OUTPATIENT
Start: 2025-06-28 | End: 2025-06-28

## 2025-06-28 RX ORDER — DIPHENHYDRAMINE HYDROCHLORIDE 50 MG/ML
25 INJECTION, SOLUTION INTRAMUSCULAR; INTRAVENOUS EVERY 6 HOURS PRN
Status: DISCONTINUED | OUTPATIENT
Start: 2025-06-28 | End: 2025-06-28

## 2025-06-28 RX ORDER — GABAPENTIN 300 MG/1
900 CAPSULE ORAL 3 TIMES DAILY
Status: DISCONTINUED | OUTPATIENT
Start: 2025-06-28 | End: 2025-06-30

## 2025-06-28 RX ORDER — SODIUM CHLORIDE 9 MG/ML
INJECTION, SOLUTION INTRAVENOUS CONTINUOUS
Status: DISCONTINUED | OUTPATIENT
Start: 2025-06-28 | End: 2025-07-02

## 2025-06-28 RX ORDER — HYDROCODONE BITARTRATE AND ACETAMINOPHEN 10; 325 MG/1; MG/1
1 TABLET ORAL EVERY 6 HOURS PRN
Refills: 0 | Status: DISCONTINUED | OUTPATIENT
Start: 2025-06-28 | End: 2025-06-28

## 2025-06-28 RX ORDER — AMLODIPINE BESYLATE 5 MG/1
10 TABLET ORAL DAILY
Status: DISCONTINUED | OUTPATIENT
Start: 2025-06-29 | End: 2025-07-04 | Stop reason: HOSPADM

## 2025-06-28 RX ORDER — MORPHINE SULFATE 30 MG/1
30 TABLET, FILM COATED, EXTENDED RELEASE ORAL EVERY 12 HOURS
Refills: 0 | Status: DISCONTINUED | OUTPATIENT
Start: 2025-06-28 | End: 2025-07-04 | Stop reason: HOSPADM

## 2025-06-28 RX ORDER — HYDROMORPHONE HYDROCHLORIDE 2 MG/ML
2 INJECTION, SOLUTION INTRAMUSCULAR; INTRAVENOUS; SUBCUTANEOUS EVERY 4 HOURS PRN
Status: DISCONTINUED | OUTPATIENT
Start: 2025-06-28 | End: 2025-06-29

## 2025-06-28 RX ORDER — ONDANSETRON 4 MG/1
4 TABLET, ORALLY DISINTEGRATING ORAL
Status: COMPLETED | OUTPATIENT
Start: 2025-06-28 | End: 2025-06-28

## 2025-06-28 RX ORDER — HYDROMORPHONE HYDROCHLORIDE 1 MG/ML
1 INJECTION, SOLUTION INTRAMUSCULAR; INTRAVENOUS; SUBCUTANEOUS
Refills: 0 | Status: COMPLETED | OUTPATIENT
Start: 2025-06-28 | End: 2025-06-28

## 2025-06-28 RX ORDER — PANTOPRAZOLE SODIUM 40 MG/1
40 TABLET, DELAYED RELEASE ORAL DAILY
Status: DISCONTINUED | OUTPATIENT
Start: 2025-06-29 | End: 2025-07-04 | Stop reason: HOSPADM

## 2025-06-28 RX ORDER — PRAZOSIN HYDROCHLORIDE 1 MG/1
1 CAPSULE ORAL NIGHTLY
Status: DISCONTINUED | OUTPATIENT
Start: 2025-06-28 | End: 2025-07-04 | Stop reason: HOSPADM

## 2025-06-28 RX ORDER — FLUOXETINE 20 MG/1
80 CAPSULE ORAL DAILY
Status: DISCONTINUED | OUTPATIENT
Start: 2025-06-29 | End: 2025-07-04 | Stop reason: HOSPADM

## 2025-06-28 RX ORDER — POTASSIUM CHLORIDE 20 MEQ/1
40 TABLET, EXTENDED RELEASE ORAL ONCE
Status: COMPLETED | OUTPATIENT
Start: 2025-06-28 | End: 2025-06-28

## 2025-06-28 RX ORDER — ONDANSETRON HYDROCHLORIDE 2 MG/ML
4 INJECTION, SOLUTION INTRAVENOUS EVERY 8 HOURS PRN
Status: DISCONTINUED | OUTPATIENT
Start: 2025-06-28 | End: 2025-07-04 | Stop reason: HOSPADM

## 2025-06-28 RX ORDER — TALC
6 POWDER (GRAM) TOPICAL NIGHTLY PRN
Status: DISCONTINUED | OUTPATIENT
Start: 2025-06-28 | End: 2025-06-28

## 2025-06-28 RX ORDER — MORPHINE SULFATE 30 MG/1
30 TABLET, FILM COATED, EXTENDED RELEASE ORAL EVERY 12 HOURS
Refills: 0 | Status: DISCONTINUED | OUTPATIENT
Start: 2025-06-28 | End: 2025-06-28

## 2025-06-28 RX ORDER — DICYCLOMINE HYDROCHLORIDE 10 MG/1
10 CAPSULE ORAL DAILY PRN
Status: DISCONTINUED | OUTPATIENT
Start: 2025-06-28 | End: 2025-07-04 | Stop reason: HOSPADM

## 2025-06-28 RX ORDER — HYDROMORPHONE HYDROCHLORIDE 2 MG/ML
2 INJECTION, SOLUTION INTRAMUSCULAR; INTRAVENOUS; SUBCUTANEOUS EVERY 4 HOURS PRN
Status: DISCONTINUED | OUTPATIENT
Start: 2025-06-28 | End: 2025-06-28

## 2025-06-28 RX ADMIN — HYDROMORPHONE HYDROCHLORIDE 2 MG: 2 INJECTION INTRAMUSCULAR; INTRAVENOUS; SUBCUTANEOUS at 10:06

## 2025-06-28 RX ADMIN — SODIUM CHLORIDE: 9 INJECTION, SOLUTION INTRAVENOUS at 05:06

## 2025-06-28 RX ADMIN — GABAPENTIN 900 MG: 300 CAPSULE ORAL at 08:06

## 2025-06-28 RX ADMIN — HYDROMORPHONE HYDROCHLORIDE 1 MG: 1 INJECTION, SOLUTION INTRAMUSCULAR; INTRAVENOUS; SUBCUTANEOUS at 03:06

## 2025-06-28 RX ADMIN — HYDROMORPHONE HYDROCHLORIDE 2 MG: 2 INJECTION INTRAMUSCULAR; INTRAVENOUS; SUBCUTANEOUS at 06:06

## 2025-06-28 RX ADMIN — ONDANSETRON 4 MG: 4 TABLET, ORALLY DISINTEGRATING ORAL at 11:06

## 2025-06-28 RX ADMIN — HYDROMORPHONE HYDROCHLORIDE 1 MG: 1 INJECTION, SOLUTION INTRAMUSCULAR; INTRAVENOUS; SUBCUTANEOUS at 11:06

## 2025-06-28 RX ADMIN — DIPHENHYDRAMINE HYDROCHLORIDE 25 MG: 50 INJECTION INTRAMUSCULAR; INTRAVENOUS at 10:06

## 2025-06-28 RX ADMIN — TIZANIDINE 4 MG: 4 TABLET ORAL at 06:06

## 2025-06-28 RX ADMIN — POTASSIUM CHLORIDE 40 MEQ: 1500 TABLET, EXTENDED RELEASE ORAL at 05:06

## 2025-06-28 RX ADMIN — WARFARIN SODIUM 4 MG: 2 TABLET ORAL at 06:06

## 2025-06-28 RX ADMIN — PRAZOSIN HYDROCHLORIDE 1 MG: 1 CAPSULE ORAL at 08:06

## 2025-06-28 RX ADMIN — HYDROMORPHONE HYDROCHLORIDE 1 MG: 1 INJECTION, SOLUTION INTRAMUSCULAR; INTRAVENOUS; SUBCUTANEOUS at 12:06

## 2025-06-28 RX ADMIN — MORPHINE SULFATE 30 MG: 30 TABLET, FILM COATED, EXTENDED RELEASE ORAL at 05:06

## 2025-06-28 RX ADMIN — DIPHENHYDRAMINE HYDROCHLORIDE 25 MG: 50 INJECTION INTRAMUSCULAR; INTRAVENOUS at 06:06

## 2025-06-28 RX ADMIN — DIPHENHYDRAMINE HYDROCHLORIDE 25 MG: 50 INJECTION, SOLUTION INTRAMUSCULAR; INTRAVENOUS at 11:06

## 2025-06-28 RX ADMIN — GABAPENTIN 900 MG: 300 CAPSULE ORAL at 04:06

## 2025-06-28 NOTE — ED TRIAGE NOTES
Patient presents to ED with complaints of right leg pain and abdominal pain since last night, denies headache, SOB, and chest pain, AAOx4, NAD noted, Hx of HTN and Sickle Cell.

## 2025-06-28 NOTE — ASSESSMENT & PLAN NOTE
Patient's hemoglobin is 10.5 baseline is 8.  Reticulocyte count 1.6.     Pt is afebrile, no chest pain, SOB, no concerns for acute chest    Home regimen includes folic acid 1 mg daily.   Home pain regimen is  morphine (MS CONTIN) 30 MG every 12 hr tablet and Percocet 10/325 MG Tab prn pain every 6 hours.   Current pain is 9/10. Baseline pain is 0.   Will monitor blood counts for need for transfusion     -Pain meds: gabapentin 900 mg q8 hours, MS contin 30 mg q12 hours, PRN oxycodone and prn dilaudid  -IVF started

## 2025-06-28 NOTE — ASSESSMENT & PLAN NOTE
History of pulmonary embolism    Lab Results   Component Value Date    INR 3.0 (H) 06/28/2025                PROTIME 31.6 (H) 06/28/2025                 Daily PT/INR  Coumadin 4 mg daily

## 2025-06-28 NOTE — H&P
[facility] Emergency Dept  [facility] Medicine  History & Physical    Patient Name: Nazanin Malone  MRN: [id number]  Patient Class: OP- Observation  Admission Date: 6/28/2025  Attending Physician: Oksana Phillips MD   Primary Care Provider: Kezia Primary Doctor         Patient information was obtained from patient, past medical records, and ER records.     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 She reports that it began yesterday and she believes it is a sickle cell pain crisis. She says at baseline she does not have pain. Her home regimen did not relieve her pain. Nazanin denies chest pain and shortness of breath. She mentioned that she has a virtual appointment with Dr Soni, her hematologist, on Tuesday. Her pain was not controlled in the ED after IV pain med administration. Xray of right tib/fib negative for any acute findings. Patient admitted to hospital medicine for further management.     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/2017    Acute venous embolism and thrombosis of internal jugular veins     Asthma     Avascular necrosis of femur     Depression     History of pulmonary embolism     Hypertension     Morbid obesity     Opioid dependence 04/16/2017    Pneumonia due to Streptococcus pneumoniae 04/25/2017    Right lower lobe pneumonia 04/29/2017    Sepsis due to Streptococcus pneumoniae  2017    Sickle cell-beta thalassemia disease with pain     Trigeminal neuralgia        Past Surgical History:   Procedure Laterality Date     SECTION      ESOPHAGOGASTRODUODENOSCOPY N/A 2024    Procedure: EGD (ESOPHAGOGASTRODUODENOSCOPY);  Surgeon: Allen Marshall MD;  Location: [address];  Service: Gastroenterology;  Laterality: N/A;    TONSILLECTOMY      TUBAL LIGATION         Review of patient's allergies indicates:   Allergen Reactions    Cat dander Anaphylaxis, Itching and Shortness Of Breath    Nsaids (non-steroidal anti-inflammatory drug) Anaphylaxis, Itching and Shortness Of Breath    Latex Rash       No current facility-administered medications on file prior to encounter.     Current Outpatient Medications on File Prior to Encounter   Medication Sig    acetaminophen (TYLENOL) 325 MG tablet Take 2 tablets (650 mg total) by mouth every 8 (eight) hours as needed for Pain.    albuterol (VENTOLIN HFA) 90 mcg/actuation inhaler Inhale 2 puffs into the lungs every 6 (six) hours as needed for Wheezing or Shortness of Breath. Rescue    amLODIPine (NORVASC) 10 MG tablet Take 1 tablet (10 mg total) by mouth once daily.    ascorbic acid (VITAMIN C ORAL) Take 1 capsule by mouth Daily.    dicyclomine (BENTYL) 10 MG capsule Take 10 mg by mouth daily as needed.    FLUoxetine 40 MG capsule Take 2 capsules (80 mg total) by mouth once daily.    fluticasone propionate (FLONASE) 50 mcg/actuation nasal spray 1 spray (50 mcg total) by Each Nostril route daily as needed for Allergies.    folic acid (FOLVITE) 1 MG tablet Take 1 tablet (1 mg total) by mouth once daily.    gabapentin (NEURONTIN) 300 MG capsule Take 2-3 capsules (600-900 mg total) by mouth 3 (three) times daily.    morphine (MS CONTIN) 30 MG 12 hr tablet Take 1 tablet (30 mg total) by mouth every 12 (twelve) hours.    pantoprazole (PROTONIX) 40 MG tablet Take 1 tablet (40 mg total) by mouth once daily.    prazosin (MINIPRESS) 1 MG Cap Take 1  capsule (1 mg total) by mouth every evening.    senna-docusate 8.6-50 mg (PERICOLACE) 8.6-50 mg per tablet Take 1 tablet by mouth daily as needed for Constipation.    tiZANidine (ZANAFLEX) 4 MG tablet Take 1 tablet (4 mg total) by mouth every 8 (eight) hours as needed.    warfarin (COUMADIN) 4 MG tablet Take 1 tablet (4 mg total) by mouth Daily. As directed by the [facility]    naloxone (NARCAN) 4 mg/actuation Spry 4mg by nasal route as needed for opioid overdose; may repeat every 2-3 minutes in alternating nostrils until medical help arrives. Call 911     Family History       Problem Relation (Age of Onset)    Diabetes Mother    Heart disease Mother, Father          Tobacco Use    Smoking status: Never    Smokeless tobacco: Never   Vaping Use    Vaping status: Never Used   Substance and Sexual Activity    Alcohol use: No    Drug use: Yes     Types: Marijuana     Comment: periodically     Sexual activity: Not Currently     Birth control/protection: See Surgical Hx     Review of Systems   Constitutional:  Negative for activity change.   Respiratory:  Negative for cough, chest tightness and shortness of breath.    Cardiovascular:  Negative for chest pain and leg swelling.   Gastrointestinal:  Positive for abdominal pain (cramping).   Genitourinary:  Negative for decreased urine volume, dysuria, urgency and vaginal bleeding.   Musculoskeletal:  Positive for arthralgias and myalgias.   Skin: Negative.    Neurological:  Negative for dizziness and headaches.   Psychiatric/Behavioral:  Negative for agitation.      Objective:     Vital Signs (Most Recent):  Temp: 98.4 °F (36.9 °C) (06/28/25 1512)  Pulse: 87 (06/28/25 1512)  Resp: 18 (06/28/25 1512)  BP: 130/85 (06/28/25 1512)  SpO2: 99 % (06/28/25 1512) Vital Signs (24h Range):  Temp:  [98.4 °F (36.9 °C)-99.5 °F (37.5 °C)] 98.4 °F (36.9 °C)  Pulse:  [85-95] 87  Resp:  [17-20] 18  SpO2:  [97 %-100 %] 99 %  BP: (130-160)/() 130/85     Weight: 99.8 kg (220 lb)  Body  mass index is 40.24 kg/m².     Physical Exam  Vitals and nursing note reviewed.   HENT:      Head: Normocephalic.      Mouth/Throat:      Mouth: Mucous membranes are moist.   Cardiovascular:      Rate and Rhythm: Normal rate.   Pulmonary:      Effort: Pulmonary effort is normal.      Breath sounds: Normal breath sounds.   Abdominal:      General: There is no distension.      Palpations: Abdomen is soft.      Tenderness: There is no abdominal tenderness.   Musculoskeletal:         General: Normal range of motion.      Cervical back: Normal range of motion.   Skin:     General: Skin is warm.   Neurological:      General: No focal deficit present.      Mental Status: She is alert and oriented to person, place, and time.   Psychiatric:         Mood and Affect: Mood normal.         Behavior: Behavior normal.                Significant Labs: All pertinent labs within the past 24 hours have been reviewed.  BMP:   Recent Labs   Lab 06/28/25  1057         K 3.4*      CO2 21*   BUN 7   CREATININE 1.0   CALCIUM 9.8   MG 2.0     CBC:   Recent Labs   Lab 06/28/25  1057   WBC 7.20   HGB 10.5*   HCT 31.7*        CMP:   Recent Labs   Lab 06/28/25  1057      K 3.4*      CO2 21*      BUN 7   CREATININE 1.0   CALCIUM 9.8   PROT 8.4   ALBUMIN 3.9   BILITOT 0.8   ALKPHOS 93   AST 22   ALT 16   ANIONGAP 13       Significant Imaging: I have reviewed all pertinent imaging results/findings within the past 24 hours.  X-Ray Tibia Fibula 2 View Right  Narrative: EXAMINATION:  XR TIBIA FIBULA 2 VIEW RIGHT    CLINICAL HISTORY:  Pain in leg, unspecified    TECHNIQUE:  AP and lateral views of the right tibia and fibula were performed.    COMPARISON:  September 13, 2024    FINDINGS:  No displaced fracture or subluxation.  No suspicious bone lesion.    Unremarkable soft tissues.  Impression: No acute abnormality.    Electronically signed by: Jarret Bentley  MD  Date:    06/28/2025  Time:    14:45      Assessment/Plan:     Assessment & Plan  Vaso-occlusive pain due to sickle cell disease  Drug dependence  Patient's hemoglobin is 10.5 baseline is 8.  Reticulocyte count 1.6.     Pt is afebrile, no chest pain, SOB, no concerns for acute chest    Home regimen includes folic acid 1 mg daily.   Home pain regimen is  morphine (MS CONTIN) 30 MG every 12 hr tablet and Percocet 10/325 MG Tab prn pain every 6 hours.   Current pain is 9/10. Baseline pain is 0.   Will monitor blood counts for need for transfusion     -Pain meds: gabapentin 900 mg q8 hours, MS contin 30 mg q12 hours, PRN oxycodone and prn dilaudid  -IVF started      Intermittent asthma  PRN albuterol    Warfarin anticoagulation  History of pulmonary embolism  History of pulmonary embolism    Lab Results   Component Value Date    INR 3.0 (H) 06/28/2025                PROTIME 31.6 (H) 06/28/2025                 Daily PT/INR  Coumadin 4 mg daily               VTE Risk Mitigation (From admission, onward)           Ordered     warfarin (COUMADIN) tablet 4 mg  Daily         06/28/25 1530     IP VTE HIGH RISK PATIENT  Once         06/28/25 1530     Place sequential compression device  Until discontinued         06/28/25 1530                                     On 06/28/2025, patient placed in hospital observation services under my care in collaboration with Dr Phillips.           Angelica Wong, MARCUS  Department of Hospital Medicine  [facility] - Emergency Dept

## 2025-06-28 NOTE — SUBJECTIVE & OBJECTIVE
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    Acute venous embolism and thrombosis of internal jugular veins     Asthma     Avascular necrosis of femur     Depression     History of pulmonary embolism     Hypertension     Morbid obesity     Opioid dependence 2017    Pneumonia due to Streptococcus pneumoniae 2017    Right lower lobe pneumonia 2017    Sepsis due to Streptococcus pneumoniae 2017    Sickle cell-beta thalassemia disease with pain     Trigeminal neuralgia        Past Surgical History:   Procedure Laterality Date     SECTION      ESOPHAGOGASTRODUODENOSCOPY N/A 2024    Procedure: EGD (ESOPHAGOGASTRODUODENOSCOPY);  Surgeon: Allen Marshall MD;  Location: [address]);  Service: Gastroenterology;  Laterality: N/A;    TONSILLECTOMY      TUBAL LIGATION         Review of patient's allergies indicates:   Allergen Reactions    Cat dander Anaphylaxis, Itching and Shortness Of Breath    Nsaids (non-steroidal anti-inflammatory drug) Anaphylaxis, Itching and Shortness Of Breath    Latex Rash       No current facility-administered medications on file prior to encounter.     Current Outpatient Medications on File Prior to Encounter   Medication Sig    acetaminophen (TYLENOL) 325 MG tablet Take 2 tablets (650 mg total) by mouth every 8 (eight) hours as needed for Pain.    albuterol (VENTOLIN HFA) 90 mcg/actuation inhaler Inhale 2 puffs into the lungs every 6 (six) hours as needed for Wheezing or Shortness of Breath. Rescue    amLODIPine (NORVASC) 10 MG tablet Take 1 tablet (10 mg total) by mouth once daily.    ascorbic acid (VITAMIN C ORAL) Take 1 capsule by mouth Daily.    dicyclomine (BENTYL) 10 MG capsule Take 10 mg by mouth daily as needed.    FLUoxetine 40 MG capsule Take 2 capsules (80 mg total) by mouth once daily.    fluticasone propionate (FLONASE) 50  mcg/actuation nasal spray 1 spray (50 mcg total) by Each Nostril route daily as needed for Allergies.    folic acid (FOLVITE) 1 MG tablet Take 1 tablet (1 mg total) by mouth once daily.    gabapentin (NEURONTIN) 300 MG capsule Take 2-3 capsules (600-900 mg total) by mouth 3 (three) times daily.    morphine (MS CONTIN) 30 MG 12 hr tablet Take 1 tablet (30 mg total) by mouth every 12 (twelve) hours.    pantoprazole (PROTONIX) 40 MG tablet Take 1 tablet (40 mg total) by mouth once daily.    prazosin (MINIPRESS) 1 MG Cap Take 1 capsule (1 mg total) by mouth every evening.    senna-docusate 8.6-50 mg (PERICOLACE) 8.6-50 mg per tablet Take 1 tablet by mouth daily as needed for Constipation.    tiZANidine (ZANAFLEX) 4 MG tablet Take 1 tablet (4 mg total) by mouth every 8 (eight) hours as needed.    warfarin (COUMADIN) 4 MG tablet Take 1 tablet (4 mg total) by mouth Daily. As directed by the [facility]    naloxone (NARCAN) 4 mg/actuation Spry 4mg by nasal route as needed for opioid overdose; may repeat every 2-3 minutes in alternating nostrils until medical help arrives. Call 911     Family History       Problem Relation (Age of Onset)    Diabetes Mother    Heart disease Mother, Father          Tobacco Use    Smoking status: Never    Smokeless tobacco: Never   Vaping Use    Vaping status: Never Used   Substance and Sexual Activity    Alcohol use: No    Drug use: Yes     Types: Marijuana     Comment: periodically     Sexual activity: Not Currently     Birth control/protection: See Surgical Hx     Review of Systems   Constitutional:  Negative for activity change.   Respiratory:  Negative for cough, chest tightness and shortness of breath.    Cardiovascular:  Negative for chest pain and leg swelling.   Gastrointestinal:  Positive for abdominal pain (cramping).   Genitourinary:  Negative for decreased urine volume, dysuria, urgency and vaginal bleeding.   Musculoskeletal:  Positive for arthralgias and myalgias.   Skin:  Negative.    Neurological:  Negative for dizziness and headaches.   Psychiatric/Behavioral:  Negative for agitation.      Objective:     Vital Signs (Most Recent):  Temp: 98.4 °F (36.9 °C) (06/28/25 1512)  Pulse: 87 (06/28/25 1512)  Resp: 18 (06/28/25 1512)  BP: 130/85 (06/28/25 1512)  SpO2: 99 % (06/28/25 1512) Vital Signs (24h Range):  Temp:  [98.4 °F (36.9 °C)-99.5 °F (37.5 °C)] 98.4 °F (36.9 °C)  Pulse:  [85-95] 87  Resp:  [17-20] 18  SpO2:  [97 %-100 %] 99 %  BP: (130-160)/() 130/85     Weight: 99.8 kg (220 lb)  Body mass index is 40.24 kg/m².     Physical Exam  Vitals and nursing note reviewed.   HENT:      Head: Normocephalic.      Mouth/Throat:      Mouth: Mucous membranes are moist.   Cardiovascular:      Rate and Rhythm: Normal rate.   Pulmonary:      Effort: Pulmonary effort is normal.      Breath sounds: Normal breath sounds.   Abdominal:      General: There is no distension.      Palpations: Abdomen is soft.      Tenderness: There is no abdominal tenderness.   Musculoskeletal:         General: Normal range of motion.      Cervical back: Normal range of motion.   Skin:     General: Skin is warm.   Neurological:      General: No focal deficit present.      Mental Status: She is alert and oriented to person, place, and time.   Psychiatric:         Mood and Affect: Mood normal.         Behavior: Behavior normal.                Significant Labs: All pertinent labs within the past 24 hours have been reviewed.  BMP:   Recent Labs   Lab 06/28/25  1057         K 3.4*      CO2 21*   BUN 7   CREATININE 1.0   CALCIUM 9.8   MG 2.0     CBC:   Recent Labs   Lab 06/28/25  1057   WBC 7.20   HGB 10.5*   HCT 31.7*        CMP:   Recent Labs   Lab 06/28/25  1057      K 3.4*      CO2 21*      BUN 7   CREATININE 1.0   CALCIUM 9.8   PROT 8.4   ALBUMIN 3.9   BILITOT 0.8   ALKPHOS 93   AST 22   ALT 16   ANIONGAP 13       Significant Imaging: I have reviewed all pertinent imaging  results/findings within the past 24 hours.  X-Ray Tibia Fibula 2 View Right  Narrative: EXAMINATION:  XR TIBIA FIBULA 2 VIEW RIGHT    CLINICAL HISTORY:  Pain in leg, unspecified    TECHNIQUE:  AP and lateral views of the right tibia and fibula were performed.    COMPARISON:  September 13, 2024    FINDINGS:  No displaced fracture or subluxation.  No suspicious bone lesion.    Unremarkable soft tissues.  Impression: No acute abnormality.    Electronically signed by: Jarret Bentley MD  Date:    06/28/2025  Time:    14:45

## 2025-06-28 NOTE — HPI
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 She reports that it began yesterday and she believes it is a sickle cell pain crisis. She says at baseline she does not have pain. Her home regimen did not relieve her pain. Nazanin denies chest pain and shortness of breath. She mentioned that she has a virtual appointment with Dr Soni, her hematologist, on Tuesday. Her pain was not controlled in the ED after IV pain med administration. Xray of right tib/fib negative for any acute findings. Patient admitted to hospital medicine for further management.   "

## 2025-06-28 NOTE — ED PROVIDER NOTES
"Encounter Date: 2025       History     Chief Complaint   Patient presents with    Leg Pain     EMS activated for R leg pain onset last night, hx of Sickle Cell Disease     45-year-old female presents with complaint of acute on chronic right leg pain which started last night.  Pain is located to her right lower leg at site of previous nerve injury during an episode of rhabdomyolysis.  She reports a history of sickle cell anemia, and states this "might be my sickle cell pain."  She also reports a vague abdominal discomfort but denies any dysuria, vaginal discharge or bleeding, vomiting, constipation, or diarrhea.  She denies chest pain.  Patient states "they usually give me Dilaudid and keep me overnight. " She states she is taking oral morphine and oxycodone at home without relief.    The history is provided by the patient.     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    Acute venous embolism and thrombosis of internal jugular veins     Asthma     Avascular necrosis of femur     Depression     History of pulmonary embolism     Hypertension     Morbid obesity     Opioid dependence 2017    Pneumonia due to Streptococcus pneumoniae 2017    Right lower lobe pneumonia 2017    Sepsis due to Streptococcus pneumoniae 2017    Sickle cell-beta thalassemia disease with pain     Trigeminal neuralgia      Past Surgical History:   Procedure Laterality Date     SECTION      ESOPHAGOGASTRODUODENOSCOPY N/A 2024    Procedure: EGD (ESOPHAGOGASTRODUODENOSCOPY);  Surgeon: Allen Marshall MD;  Location: [facility] ([address] [place] [address]);  Service: Gastroenterology;  " Laterality: N/A;    TONSILLECTOMY      TUBAL LIGATION       Family History   Problem Relation Name Age of Onset    Heart disease Mother      Diabetes Mother      Heart disease Father      Breast cancer Neg Hx      Ovarian cancer Neg Hx      Colon cancer Neg Hx       Social History[1]  Review of Systems   Constitutional:  Negative for chills and fever.   HENT:  Negative for congestion and sore throat.    Eyes:  Negative for visual disturbance.   Respiratory:  Negative for cough and shortness of breath.    Cardiovascular:  Negative for chest pain and palpitations.   Gastrointestinal:  Negative for abdominal pain, diarrhea and vomiting.   Genitourinary:  Negative for decreased urine volume, dysuria and vaginal discharge.   Musculoskeletal:  Positive for arthralgias and myalgias. Negative for joint swelling, neck pain and neck stiffness.   Skin:  Negative for rash and wound.   Neurological:  Negative for weakness, numbness and headaches.   Psychiatric/Behavioral:  Negative for confusion.        Physical Exam     Initial Vitals   BP Pulse Resp Temp SpO2   06/28/25 0727 06/28/25 0727 06/28/25 0727 06/28/25 0739 06/28/25 0727   (!) 160/100 92 20 99.5 °F (37.5 °C) 100 %      MAP       --                Physical Exam    Nursing note and vitals reviewed.  Constitutional: She appears well-developed and well-nourished. No distress.   HENT:   Head: Normocephalic and atraumatic. Mouth/Throat: Oropharynx is clear and moist. No oropharyngeal exudate.   Eyes: Conjunctivae and EOM are normal. Pupils are equal, round, and reactive to light.   Neck: Neck supple.   Cardiovascular:  Normal rate and normal heart sounds.           No murmur heard.  Pulmonary/Chest: Breath sounds normal. No respiratory distress. She has no wheezes. She has no rhonchi. She has no rales.   Abdominal: Abdomen is soft. There is no abdominal tenderness. There is no rebound and no guarding.   Musculoskeletal:         General: No tenderness or edema.      Cervical  back: Neck supple.      Comments: Right lower leg: Large well-healed scar to lateral aspect.  There is tenderness, but no erythema, induration, edema, or decreased capillary refill.     Neurological: She is alert and oriented to person, place, and time. She has normal strength. GCS score is 15. GCS eye subscore is 4. GCS verbal subscore is 5. GCS motor subscore is 6.   Skin: Skin is warm and dry. No rash noted.   Psychiatric: She has a normal mood and affect. Thought content normal.       ED Course   Procedures  Labs Reviewed   COMPREHENSIVE METABOLIC PANEL - Abnormal       Result Value    Sodium 142      Potassium 3.4 (*)     Chloride 108      CO2 21 (*)     Glucose 101      BUN 7      Creatinine 1.0      Calcium 9.8      Protein Total 8.4      Albumin 3.9      Bilirubin Total 0.8      ALP 93      AST 22      ALT 16      Anion Gap 13      eGFR >60     CBC WITH DIFFERENTIAL - Abnormal    WBC 7.20      RBC 4.43      HGB 10.5 (*)     HCT 31.7 (*)     MCV 72 (*)     MCH 23.7 (*)     MCHC 33.1      RDW 26.2 (*)     Platelet Count 417      MPV 9.1 (*)     Nucleated RBC 0      Neut % 62.1      Lymph % 28.1      Mono % 7.5      Eos % 1.8      Basophil % 0.4      Imm Grans % 0.1      Neut # 4.47      Lymph # 2.02      Mono # 0.54      Eos # 0.13      Baso # 0.03      Imm Grans # 0.01     PROTIME-INR - Abnormal    PT 31.6 (*)     INR 3.0 (*)    LACTIC ACID, PLASMA - Normal    Lactic Acid Level 0.6      Narrative:     Falsely low lactic acid results can be found in samples containing >=13.0 mg/dL total bilirubin and/or >=3.5 mg/dL direct bilirubin.    LIPASE - Normal    Lipase Level 56     RETICULOCYTES - Normal    Retic Count, Automated 1.6     MAGNESIUM - Normal    Magnesium  2.0     CBC W/ AUTO DIFFERENTIAL    Narrative:     The following orders were created for panel order CBC auto differential.  Procedure                               Abnormality         Status                     ---------                                -----------         ------                     CBC with Differential[[id number]]       Abnormal            Final result                 Please view results for these tests on the individual orders.   URINALYSIS, REFLEX TO URINE CULTURE          Imaging Results              X-Ray Tibia Fibula 2 View Right (Final result)  Result time 06/28/25 14:45:46      Final result by Jarret Bentley MD (06/28/25 14:45:46)                   Impression:      No acute abnormality.      Electronically signed by: Jarret Bentley MD  Date:    06/28/2025  Time:    14:45               Narrative:    EXAMINATION:  XR TIBIA FIBULA 2 VIEW RIGHT    CLINICAL HISTORY:  Pain in leg, unspecified    TECHNIQUE:  AP and lateral views of the right tibia and fibula were performed.    COMPARISON:  September 13, 2024    FINDINGS:  No displaced fracture or subluxation.  No suspicious bone lesion.    Unremarkable soft tissues.                                    X-Rays:   Independently Interpreted Readings:   Other Readings:  Right tibia/fibula: No fracture, no dislocation, no foreign body.  I will defer to the official radiology reading.    Medications   melatonin tablet 6 mg (has no administration in time range)   ondansetron injection 4 mg (has no administration in time range)   oxyCODONE immediate release tablet 5 mg (has no administration in time range)   albuterol inhaler 2 puff (has no administration in time range)   amLODIPine tablet 10 mg (has no administration in time range)   ascorbic acid (vitamin C) tablet 500 mg (has no administration in time range)   dicyclomine capsule 10 mg (has no administration in time range)   FLUoxetine capsule 80 mg (has no administration in time range)   folic acid tablet 1 mg (has no administration in time range)   pantoprazole EC tablet 40 mg (has no administration in time range)   prazosin capsule 1 mg (has no administration in time range)   senna-docusate 8.6-50 mg per tablet 1 tablet (has no administration in  time range)   tiZANidine tablet 4 mg (has no administration in time range)   warfarin (COUMADIN) tablet 4 mg (has no administration in time range)   gabapentin capsule 900 mg (900 mg Oral Given 6/28/25 1603)   morphine 12 hr tablet 30 mg (has no administration in time range)   HYDROmorphone (PF) injection 2 mg (has no administration in time range)   polyethylene glycol packet 17 g (has no administration in time range)   acetaminophen tablet 650 mg (has no administration in time range)   naloxone 0.4 mg/mL injection 0.02 mg (has no administration in time range)   0.9% NaCl infusion (has no administration in time range)   diphenhydrAMINE injection 25 mg (has no administration in time range)   potassium chloride SA CR tablet 40 mEq (has no administration in time range)   HYDROmorphone injection 1 mg (1 mg Intravenous Given 6/28/25 1110)   diphenhydrAMINE injection 25 mg (25 mg Intravenous Given 6/28/25 1110)   ondansetron disintegrating tablet 4 mg (4 mg Oral Given 6/28/25 1140)   HYDROmorphone injection 1 mg (1 mg Intravenous Given 6/28/25 1229)     Medical Decision Making  Urgent evaluation a 45-year-old female who presents with the acute on chronic right lower extremity pain.  There has been no new trauma.  Patient is concerned this could represent a sickle cell crisis.  Vital signs are benign, afebrile.  On exam there is a large well-healed scar with tenderness but no erythema or swelling.  I suspect this represents nerve pain from original injury versus sickle cell pain crisis.  CBC does not show acute anemia, rather improved from baseline so I am less suspicious for sickle cell pain crisis.  Patient also requested pain medication immediately by name and had specific medication she wanted to receive - I suspect she needs better outpatient pain management.  She is admitted to the hospitalist service in serious condition for further care.    Amount and/or Complexity of Data Reviewed  Labs: ordered. Decision-making  "details documented in ED Course.  Radiology: ordered and independent interpretation performed. Decision-making details documented in ED Course.    Risk  OTC drugs.  Prescription drug management.               ED Course as of 06/28/25 1659   Sat Jun 28, 2025   1243 CBC auto differential(!)  Reviewed and benign.  Mild anemia which is better than her typical baseline.  No leukocytosis. [AK]   1243 Lactic acid, plasma  Reviewed and within normal limits. [AK]   1243 Lipase  Reviewed and within normal limits. [AK]   1243 Comprehensive metabolic panel(!)  Reviewed and benign.  Mild hypokalemia.  No major electrolyte disturbance or azotemia or abnormal LFTs. [AK]   1355 Patient is requesting admission "for 1 or 2 days for pain control." will discuss with the hospitalist. [AK]      ED Course User Index  [AK] Ana Philip MD               Medical Decision Making:   Differential Diagnosis:   Includes but not limited to cellulitis, bone lesion, sickle cell pain crisis, fracture dislocation, DVT, compartment syndrome and others             Clinical Impression:  Final diagnoses:  [M79.606] Leg pain (Primary)  [G89.29] Other chronic pain  [Z86.2] History of sickle cell anemia          ED Disposition Condition    Observation              Launch MDCalc MDM  MDCalc MDM Module  Jun 28 2025 4:59 PM [Ana Philip]  Data:  - Discussed with external professional: Case discussed with Admitting Provider or a provider/trainee on their team.  I discussed the case with Dr Ritter - she will admit patient  - Independent interpretation: I independently reviewed the XR Tib+Fib-R 2V. See MDM section and/or ED Course for my interpretation. [Ana Philip]  - Test/documents/historian: 3+ tests ordered  Problems: Chest pain  Additional encounter diagnoses: Leg pain, Other chronic pain, History of sickle cell anemia  Risk: HYDROmorphone injection (Parenteral controlled substances), Admitted (Decision regarding hospitalization)             [1] "   Social History  Tobacco Use    Smoking status: Never    Smokeless tobacco: Never   Vaping Use    Vaping status: Never Used   Substance Use Topics    Alcohol use: No    Drug use: Yes     Types: Marijuana     Comment: Ana James MD  06/28/25 [id number]

## 2025-06-29 PROBLEM — M48.02 CERVICAL SPINAL STENOSIS: Status: ACTIVE | Noted: 2025-06-29

## 2025-06-29 PROBLEM — M17.11 PRIMARY OSTEOARTHRITIS OF RIGHT KNEE: Status: ACTIVE | Noted: 2025-06-29

## 2025-06-29 LAB
ABSOLUTE EOSINOPHIL (OHS): 0.27 K/UL
ABSOLUTE MONOCYTE (OHS): 0.82 K/UL (ref 0.3–1)
ABSOLUTE NEUTROPHIL COUNT (OHS): 3.16 K/UL (ref 1.8–7.7)
BASOPHILS # BLD AUTO: 0.04 K/UL
BASOPHILS NFR BLD AUTO: 0.6 %
BILIRUB UR QL STRIP.AUTO: NEGATIVE
CLARITY UR: CLEAR
COLOR UR AUTO: YELLOW
ERYTHROCYTE [DISTWIDTH] IN BLOOD BY AUTOMATED COUNT: 26.1 % (ref 11.5–14.5)
GLUCOSE UR QL STRIP: NEGATIVE
HCT VFR BLD AUTO: 29.6 % (ref 37–48.5)
HGB BLD-MCNC: 9.4 GM/DL (ref 12–16)
HGB UR QL STRIP: NEGATIVE
IMM GRANULOCYTES # BLD AUTO: 0.01 K/UL (ref 0–0.04)
IMM GRANULOCYTES NFR BLD AUTO: 0.1 % (ref 0–0.5)
INR PPP: 3.7 (ref 0.8–1.2)
KETONES UR QL STRIP: NEGATIVE
LEUKOCYTE ESTERASE UR QL STRIP: NEGATIVE
LYMPHOCYTES # BLD AUTO: 2.96 K/UL (ref 1–4.8)
MCH RBC QN AUTO: 23.4 PG (ref 27–31)
MCHC RBC AUTO-ENTMCNC: 31.8 G/DL (ref 32–36)
MCV RBC AUTO: 74 FL (ref 82–98)
NITRITE UR QL STRIP: NEGATIVE
NUCLEATED RBC (/100WBC) (OHS): 1 /100 WBC
PH UR STRIP: 7 [PH]
PLATELET # BLD AUTO: 270 K/UL (ref 150–450)
PMV BLD AUTO: 9.3 FL (ref 9.2–12.9)
PROT UR QL STRIP: NEGATIVE
PROTHROMBIN TIME: 38.2 SECONDS (ref 9–12.5)
RBC # BLD AUTO: 4.01 M/UL (ref 4–5.4)
RELATIVE EOSINOPHIL (OHS): 3.7 %
RELATIVE LYMPHOCYTE (OHS): 40.8 % (ref 18–48)
RELATIVE MONOCYTE (OHS): 11.3 % (ref 4–15)
RELATIVE NEUTROPHIL (OHS): 43.5 % (ref 38–73)
SP GR UR STRIP: 1.01
UROBILINOGEN UR STRIP-ACNC: NEGATIVE EU/DL
WBC # BLD AUTO: 7.26 K/UL (ref 3.9–12.7)

## 2025-06-29 PROCEDURE — 36415 COLL VENOUS BLD VENIPUNCTURE: CPT | Performed by: NURSE PRACTITIONER

## 2025-06-29 PROCEDURE — 51798 US URINE CAPACITY MEASURE: CPT

## 2025-06-29 PROCEDURE — 25000003 PHARM REV CODE 250: Performed by: HOSPITALIST

## 2025-06-29 PROCEDURE — 25000003 PHARM REV CODE 250: Performed by: NURSE PRACTITIONER

## 2025-06-29 PROCEDURE — 85610 PROTHROMBIN TIME: CPT | Performed by: NURSE PRACTITIONER

## 2025-06-29 PROCEDURE — 51701 INSERT BLADDER CATHETER: CPT

## 2025-06-29 PROCEDURE — 63600175 PHARM REV CODE 636 W HCPCS: Performed by: NURSE PRACTITIONER

## 2025-06-29 PROCEDURE — 81003 URINALYSIS AUTO W/O SCOPE: CPT | Performed by: EMERGENCY MEDICINE

## 2025-06-29 PROCEDURE — 85025 COMPLETE CBC W/AUTO DIFF WBC: CPT | Performed by: NURSE PRACTITIONER

## 2025-06-29 PROCEDURE — 25000003 PHARM REV CODE 250: Performed by: EMERGENCY MEDICINE

## 2025-06-29 PROCEDURE — 63600175 PHARM REV CODE 636 W HCPCS: Performed by: HOSPITALIST

## 2025-06-29 PROCEDURE — 21400001 HC TELEMETRY ROOM

## 2025-06-29 RX ORDER — HYDROMORPHONE HYDROCHLORIDE 2 MG/ML
2 INJECTION, SOLUTION INTRAMUSCULAR; INTRAVENOUS; SUBCUTANEOUS
Status: DISCONTINUED | OUTPATIENT
Start: 2025-06-29 | End: 2025-06-29

## 2025-06-29 RX ORDER — HYDROMORPHONE HYDROCHLORIDE 2 MG/ML
2 INJECTION, SOLUTION INTRAMUSCULAR; INTRAVENOUS; SUBCUTANEOUS EVERY 4 HOURS PRN
Status: DISCONTINUED | OUTPATIENT
Start: 2025-06-29 | End: 2025-06-30

## 2025-06-29 RX ORDER — MUPIROCIN 20 MG/G
OINTMENT TOPICAL 2 TIMES DAILY
Status: DISCONTINUED | OUTPATIENT
Start: 2025-06-29 | End: 2025-07-04 | Stop reason: HOSPADM

## 2025-06-29 RX ORDER — DIPHENHYDRAMINE HYDROCHLORIDE 50 MG/ML
25 INJECTION, SOLUTION INTRAMUSCULAR; INTRAVENOUS
Status: DISCONTINUED | OUTPATIENT
Start: 2025-06-29 | End: 2025-06-29

## 2025-06-29 RX ORDER — DIPHENHYDRAMINE HYDROCHLORIDE 50 MG/ML
25 INJECTION, SOLUTION INTRAMUSCULAR; INTRAVENOUS EVERY 4 HOURS PRN
Status: DISCONTINUED | OUTPATIENT
Start: 2025-06-29 | End: 2025-06-30

## 2025-06-29 RX ADMIN — DIPHENHYDRAMINE HYDROCHLORIDE 25 MG: 50 INJECTION INTRAMUSCULAR; INTRAVENOUS at 09:06

## 2025-06-29 RX ADMIN — GABAPENTIN 900 MG: 300 CAPSULE ORAL at 08:06

## 2025-06-29 RX ADMIN — SODIUM CHLORIDE 250 ML: 9 INJECTION, SOLUTION INTRAVENOUS at 07:06

## 2025-06-29 RX ADMIN — DIPHENHYDRAMINE HYDROCHLORIDE 25 MG: 50 INJECTION INTRAMUSCULAR; INTRAVENOUS at 06:06

## 2025-06-29 RX ADMIN — FLUOXETINE HYDROCHLORIDE 80 MG: 20 CAPSULE ORAL at 08:06

## 2025-06-29 RX ADMIN — MORPHINE SULFATE 30 MG: 30 TABLET, FILM COATED, EXTENDED RELEASE ORAL at 09:06

## 2025-06-29 RX ADMIN — GABAPENTIN 900 MG: 300 CAPSULE ORAL at 09:06

## 2025-06-29 RX ADMIN — DIPHENHYDRAMINE HYDROCHLORIDE 25 MG: 50 INJECTION INTRAMUSCULAR; INTRAVENOUS at 02:06

## 2025-06-29 RX ADMIN — DIPHENHYDRAMINE HYDROCHLORIDE 25 MG: 50 INJECTION INTRAMUSCULAR; INTRAVENOUS at 12:06

## 2025-06-29 RX ADMIN — HYDROMORPHONE HYDROCHLORIDE 2 MG: 2 INJECTION INTRAMUSCULAR; INTRAVENOUS; SUBCUTANEOUS at 10:06

## 2025-06-29 RX ADMIN — HYDROMORPHONE HYDROCHLORIDE 2 MG: 2 INJECTION INTRAMUSCULAR; INTRAVENOUS; SUBCUTANEOUS at 12:06

## 2025-06-29 RX ADMIN — FOLIC ACID 1 MG: 1 TABLET ORAL at 08:06

## 2025-06-29 RX ADMIN — DIPHENHYDRAMINE HYDROCHLORIDE 25 MG: 50 INJECTION INTRAMUSCULAR; INTRAVENOUS at 04:06

## 2025-06-29 RX ADMIN — AMLODIPINE BESYLATE 10 MG: 5 TABLET ORAL at 08:06

## 2025-06-29 RX ADMIN — MUPIROCIN: 20 OINTMENT TOPICAL at 09:06

## 2025-06-29 RX ADMIN — OXYCODONE HYDROCHLORIDE AND ACETAMINOPHEN 500 MG: 500 TABLET ORAL at 08:06

## 2025-06-29 RX ADMIN — HYDROMORPHONE HYDROCHLORIDE 2 MG: 2 INJECTION INTRAMUSCULAR; INTRAVENOUS; SUBCUTANEOUS at 06:06

## 2025-06-29 RX ADMIN — DIPHENHYDRAMINE HYDROCHLORIDE 25 MG: 50 INJECTION INTRAMUSCULAR; INTRAVENOUS at 10:06

## 2025-06-29 RX ADMIN — HYDROMORPHONE HYDROCHLORIDE 2 MG: 2 INJECTION INTRAMUSCULAR; INTRAVENOUS; SUBCUTANEOUS at 04:06

## 2025-06-29 RX ADMIN — PANTOPRAZOLE SODIUM 40 MG: 40 TABLET, DELAYED RELEASE ORAL at 08:06

## 2025-06-29 RX ADMIN — TIZANIDINE 4 MG: 4 TABLET ORAL at 09:06

## 2025-06-29 RX ADMIN — HYDROMORPHONE HYDROCHLORIDE 2 MG: 2 INJECTION INTRAMUSCULAR; INTRAVENOUS; SUBCUTANEOUS at 09:06

## 2025-06-29 RX ADMIN — MORPHINE SULFATE 30 MG: 30 TABLET, FILM COATED, EXTENDED RELEASE ORAL at 08:06

## 2025-06-29 RX ADMIN — HYDROMORPHONE HYDROCHLORIDE 2 MG: 2 INJECTION INTRAMUSCULAR; INTRAVENOUS; SUBCUTANEOUS at 02:06

## 2025-06-29 RX ADMIN — PRAZOSIN HYDROCHLORIDE 1 MG: 1 CAPSULE ORAL at 09:06

## 2025-06-29 RX ADMIN — OXYCODONE HYDROCHLORIDE 5 MG: 5 TABLET ORAL at 02:06

## 2025-06-29 NOTE — PLAN OF CARE
Case Management Assessment     PCP: Kezia PCP  Pharmacy: Bedside    Patient Arrived From: Home  Existing Help at Home: Family    Barriers to Discharge: None    Discharge Plan:    A. Home with family   B. Home Health    Patient will need assistance with transportation at discharge.       [facility] ([address])  Initial Discharge Assessment       Primary Care Provider: Kezia, Primary Doctor    Admission Diagnosis: Leg pain [M79.606]  Other chronic pain [G89.29]  History of sickle cell anemia [Z86.2]  Chest pain [R07.9]    Admission Date: 6/28/2025  Expected Discharge Date:     Transition of Care Barriers: (P) None    Payor: [employer] MANAGED MEDICARE / Plan: [employer] MEDICARE PLAN HMO / Product Type: Medicare Advantage /     Extended Emergency Contact Information  Primary Emergency Contact: MercedezTimoteo reno  Mobile Phone: [phone]  Relation: Daughter    Discharge Plan A: (P) Home with family  Discharge Plan B: (P) Home Health      [facility] [address] [address]  Phone: [phone] Fax: [phone]      Initial Assessment (most recent)       Adult Discharge Assessment - 06/29/25 1016          Discharge Assessment    Assessment Type Discharge Planning Assessment (P)      Confirmed/corrected address, phone number and insurance Yes (P)      Confirmed Demographics Correct on Facesheet (P)      Source of Information patient (P)      Communicated ALYSE with patient/caregiver Yes (P)      People in Home grandchild(jayce);child(jayce), adult;parent(s) (P)      Name(s) of People in Home Timoteo Malone (Daughter)  [phone] (Mobile) (P)      Facility Arrived From: home (P)      Do you expect to return to your current living situation? Yes (P)      Do you have help at home or someone to help you manage your care at home? Yes (P)      Prior to hospitilization cognitive status: Alert/Oriented (P)      Current cognitive status: Alert/Oriented (P)      Walking or Climbing Stairs Difficulty  no (P)      Dressing/Bathing Difficulty no (P)      Equipment Currently Used at Home none (P)      Readmission within 30 days? Yes (P)      Patient currently being followed by outpatient case management? No (P)      Do you currently have service(s) that help you manage your care at home? No (P)      Do you take prescription medications? Yes (P)      Do you have prescription coverage? Yes (P)      Do you have any problems affording any of your prescribed medications? No (P)      Is the patient taking medications as prescribed? yes (P)      Who is going to help you get home at discharge? will need transportation at discharge (P)      How do you get to doctors appointments? family or friend will provide;public transportation (P)      Are you on dialysis? No (P)      Do you take coumadin? No (P)      Discharge Plan A Home with family (P)      Discharge Plan B Home Health (P)      DME Needed Upon Discharge  bedside commode (P)      Discharge Plan discussed with: Patient (P)      Transition of Care Barriers None (P)
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
## 2025-06-29 NOTE — HOSPITAL COURSE
Patient admitted and treated for sickle cell pain event.  She was treated with gentle IVF, Dilaudid/Benadryl and home medications.  No ketorolac as she takes warfarin.  INR was elevated at 3.7 on her home dose so it was held and INR monitored. INR trending down and warfarin restarted. Close follow up with INR clinic.     As her pain is always centered in the RLE and she has tricompartmental arthritis of the right knee and reactive bursitis, imaging was ordered to evaluate.  She has not followed up with orthopedic surgery but was told she needed a knee replacement more than a year ago.  Noted on MRI to have bursitis of both hips (worse on the left) and severe osteoarthritis of the right knee.  Patient was referred for follow up in orthopedic surgery prior to discharge. Seen by PT/OT also discharging with AFO brace and rollator. Stable for discharge.
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
## 2025-06-29 NOTE — PLAN OF CARE
06/29/25 1024   Readmission   Was this a planned readmission? No   Why were you hospitalized in the last 30 days? Sickle cell pain crisis   Why were you readmitted? Related to previous admission   When you left the hospital how did you feel? felt ok   When you left the hospital where did you go? Home with Family   Did patient/caregiver refused recommended DC plan? No   Tell me about what happened between when you left the hospital and the day you returned. started having pain   When did you start not feeling well? 6/27/2025   Did you try to manage your symptoms your self? Yes   What did you do? took prescribed medications   Did you call anyone? No   Did you try to see or did see a doctor or nurse before you came? No   Did you have  a follow-up appointment on discharge? Yes   Did you go? No   Why?   (appointment scheduled for 7/1)

## 2025-06-29 NOTE — PLAN OF CARE
06/29/25 1024   WEST Message   Medicare Outpatient and Observation Notification regarding financial responsibility Given to patient/caregiver;Signed/date by patient/caregiver;Explained to patient/caregiver   Date WEST was signed 06/29/25   Time WEST was signed 0940

## 2025-06-29 NOTE — SUBJECTIVE & OBJECTIVE
Interval History: Patient admitted for sickle cell pain.  Pain is in her right buttock, thigh and right knee.  She has been told the pain is has been aggravated by the arthritis on that side.  Has previous diagnoses of rhabdo, osteonecrosis of the left femur, bursitis and tricompartmental arthritis of the right knee.  Has not seen orthopedic surgery.    Review of Systems   Constitutional:  Negative for chills and fever.   Respiratory:  Negative for cough and shortness of breath.    Cardiovascular:  Negative for chest pain and palpitations.   Musculoskeletal:  Positive for arthralgias, gait problem, joint swelling and myalgias.     Objective:     Vital Signs (Most Recent):  Temp: 97.5 °F (36.4 °C) (06/29/25 0747)  Pulse: 79 (06/29/25 0747)  Resp: 16 (06/29/25 0829)  BP: 107/61 (06/29/25 0747)  SpO2: (!) 93 % (06/29/25 0747) Vital Signs (24h Range):  Temp:  [97.5 °F (36.4 °C)-98.7 °F (37.1 °C)] 97.5 °F (36.4 °C)  Pulse:  [72-95] 79  Resp:  [16-18] 16  SpO2:  [93 %-100 %] 93 %  BP: ()/(55-86) 107/61     Weight: 99.8 kg (220 lb)  Body mass index is 40.24 kg/m².    Intake/Output Summary (Last 24 hours) at 6/29/2025 0930  Last data filed at 6/29/2025 0517  Gross per 24 hour   Intake 280 ml   Output 450 ml   Net -170 ml         Physical Exam  Cardiovascular:      Rate and Rhythm: Normal rate and regular rhythm.      Heart sounds: Normal heart sounds. No murmur heard.     No gallop.   Pulmonary:      Effort: Pulmonary effort is normal.      Breath sounds: Normal breath sounds.   Abdominal:      General: Bowel sounds are normal.      Palpations: Abdomen is soft.   Musculoskeletal:      Comments: Decreased ROM right knee.  Small effusion detected.  Large scar right lateral calf.   Skin:     General: Skin is warm and dry.   Neurological:      General: No focal deficit present.      Mental Status: She is alert.   Psychiatric:         Mood and Affect: Mood normal.         Behavior: Behavior normal.                Significant Labs: All pertinent labs within the past 24 hours have been reviewed.    Significant Imaging: I have reviewed all pertinent imaging results/findings within the past 24 hours.

## 2025-06-29 NOTE — PLAN OF CARE
Problem: Adult Inpatient Plan of Care  Goal: Plan of Care Review  Outcome: Ongoing  Goal: Patient-Specific Goal (Individualized)  Outcome: Ongoing  Goal: Absence of Hospital-Acquired Illness or Injury  Outcome: Ongoing  Goal: Optimal Comfort and Wellbeing  Outcome: Ongoing  Goal: Readiness for Transition of Care  Outcome: Ongoing     Problem: Bariatric Environmental Safety  Goal: Safety Maintained with Care  Outcome: Ongoing     Problem: Infection  Goal: Absence of Infection Signs and Symptoms  Outcome: Ongoing     Problem: Wound  Goal: Optimal Coping  Outcome: Ongoing  Goal: Optimal Functional Ability  Outcome: Ongoing  Goal: Absence of Infection Signs and Symptoms  Outcome: Ongoing  Goal: Improved Oral Intake  Outcome: Ongoing  Goal: Optimal Pain Control and Function  Outcome: Ongoing  Goal: Skin Health and Integrity  Outcome: Ongoing  Goal: Optimal Wound Healing  Outcome: Ongoing

## 2025-06-30 LAB
ABSOLUTE EOSINOPHIL (OHS): 0.44 K/UL
ABSOLUTE MONOCYTE (OHS): 0.99 K/UL (ref 0.3–1)
ABSOLUTE NEUTROPHIL COUNT (OHS): 3.16 K/UL (ref 1.8–7.7)
ANISOCYTOSIS BLD QL SMEAR: SLIGHT
BASOPHILS # BLD AUTO: 0.04 K/UL
BASOPHILS NFR BLD AUTO: 0.5 %
DACRYOCYTES BLD QL SMEAR: NORMAL
ERYTHROCYTE [DISTWIDTH] IN BLOOD BY AUTOMATED COUNT: 27.1 % (ref 11.5–14.5)
HCT VFR BLD AUTO: 28.1 % (ref 37–48.5)
HGB BLD-MCNC: 8.9 GM/DL (ref 12–16)
HOLD SPECIMEN: NORMAL
HYPOCHROMIA BLD QL SMEAR: NORMAL
IMM GRANULOCYTES # BLD AUTO: 0.02 K/UL (ref 0–0.04)
IMM GRANULOCYTES NFR BLD AUTO: 0.3 % (ref 0–0.5)
INR PPP: 4.3 (ref 0.8–1.2)
LYMPHOCYTES # BLD AUTO: 3.22 K/UL (ref 1–4.8)
MCH RBC QN AUTO: 23.7 PG (ref 27–31)
MCHC RBC AUTO-ENTMCNC: 31.7 G/DL (ref 32–36)
MCV RBC AUTO: 75 FL (ref 82–98)
NUCLEATED RBC (/100WBC) (OHS): 1 /100 WBC
PLATELET # BLD AUTO: 307 K/UL (ref 150–450)
PLATELET BLD QL SMEAR: NORMAL
PMV BLD AUTO: 9 FL (ref 9.2–12.9)
POIKILOCYTOSIS BLD QL SMEAR: SLIGHT
PROTHROMBIN TIME: 43.8 SECONDS (ref 9–12.5)
RBC # BLD AUTO: 3.75 M/UL (ref 4–5.4)
RELATIVE EOSINOPHIL (OHS): 5.6 %
RELATIVE LYMPHOCYTE (OHS): 40.9 % (ref 18–48)
RELATIVE MONOCYTE (OHS): 12.6 % (ref 4–15)
RELATIVE NEUTROPHIL (OHS): 40.1 % (ref 38–73)
SCHISTOCYTES BLD QL SMEAR: NORMAL
TARGETS BLD QL SMEAR: NORMAL
WBC # BLD AUTO: 7.87 K/UL (ref 3.9–12.7)

## 2025-06-30 PROCEDURE — 25000003 PHARM REV CODE 250: Performed by: HOSPITALIST

## 2025-06-30 PROCEDURE — 25000003 PHARM REV CODE 250: Performed by: EMERGENCY MEDICINE

## 2025-06-30 PROCEDURE — 21400001 HC TELEMETRY ROOM

## 2025-06-30 PROCEDURE — 63600175 PHARM REV CODE 636 W HCPCS: Performed by: HOSPITALIST

## 2025-06-30 PROCEDURE — 36415 COLL VENOUS BLD VENIPUNCTURE: CPT | Performed by: NURSE PRACTITIONER

## 2025-06-30 PROCEDURE — 97530 THERAPEUTIC ACTIVITIES: CPT

## 2025-06-30 PROCEDURE — 25000003 PHARM REV CODE 250: Performed by: NURSE PRACTITIONER

## 2025-06-30 PROCEDURE — 97165 OT EVAL LOW COMPLEX 30 MIN: CPT

## 2025-06-30 PROCEDURE — 11000001 HC ACUTE MED/SURG PRIVATE ROOM

## 2025-06-30 PROCEDURE — 85610 PROTHROMBIN TIME: CPT | Performed by: NURSE PRACTITIONER

## 2025-06-30 PROCEDURE — 85025 COMPLETE CBC W/AUTO DIFF WBC: CPT | Performed by: NURSE PRACTITIONER

## 2025-06-30 PROCEDURE — 97162 PT EVAL MOD COMPLEX 30 MIN: CPT

## 2025-06-30 RX ORDER — GABAPENTIN 300 MG/1
600 CAPSULE ORAL 3 TIMES DAILY
Status: DISCONTINUED | OUTPATIENT
Start: 2025-06-30 | End: 2025-07-04 | Stop reason: HOSPADM

## 2025-06-30 RX ORDER — HYDROMORPHONE HYDROCHLORIDE 2 MG/ML
2 INJECTION, SOLUTION INTRAMUSCULAR; INTRAVENOUS; SUBCUTANEOUS
Status: DISCONTINUED | OUTPATIENT
Start: 2025-06-30 | End: 2025-07-04 | Stop reason: HOSPADM

## 2025-06-30 RX ORDER — DIPHENHYDRAMINE HYDROCHLORIDE 50 MG/ML
50 INJECTION, SOLUTION INTRAMUSCULAR; INTRAVENOUS EVERY 6 HOURS PRN
Status: DISCONTINUED | OUTPATIENT
Start: 2025-06-30 | End: 2025-07-04 | Stop reason: HOSPADM

## 2025-06-30 RX ADMIN — PANTOPRAZOLE SODIUM 40 MG: 40 TABLET, DELAYED RELEASE ORAL at 08:06

## 2025-06-30 RX ADMIN — TIZANIDINE 4 MG: 4 TABLET ORAL at 02:06

## 2025-06-30 RX ADMIN — HYDROMORPHONE HYDROCHLORIDE 2 MG: 2 INJECTION INTRAMUSCULAR; INTRAVENOUS; SUBCUTANEOUS at 04:06

## 2025-06-30 RX ADMIN — FLUOXETINE HYDROCHLORIDE 80 MG: 20 CAPSULE ORAL at 08:06

## 2025-06-30 RX ADMIN — DIPHENHYDRAMINE HYDROCHLORIDE 25 MG: 50 INJECTION INTRAMUSCULAR; INTRAVENOUS at 08:06

## 2025-06-30 RX ADMIN — POLYETHYLENE GLYCOL 3350 17 G: 17 POWDER, FOR SOLUTION ORAL at 08:06

## 2025-06-30 RX ADMIN — MORPHINE SULFATE 30 MG: 30 TABLET, FILM COATED, EXTENDED RELEASE ORAL at 08:06

## 2025-06-30 RX ADMIN — OXYCODONE HYDROCHLORIDE 5 MG: 5 TABLET ORAL at 07:06

## 2025-06-30 RX ADMIN — GABAPENTIN 900 MG: 300 CAPSULE ORAL at 08:06

## 2025-06-30 RX ADMIN — MORPHINE SULFATE 30 MG: 30 TABLET, FILM COATED, EXTENDED RELEASE ORAL at 09:06

## 2025-06-30 RX ADMIN — GABAPENTIN 600 MG: 300 CAPSULE ORAL at 09:06

## 2025-06-30 RX ADMIN — FOLIC ACID 1 MG: 1 TABLET ORAL at 08:06

## 2025-06-30 RX ADMIN — PRAZOSIN HYDROCHLORIDE 1 MG: 1 CAPSULE ORAL at 09:06

## 2025-06-30 RX ADMIN — HYDROMORPHONE HYDROCHLORIDE 2 MG: 2 INJECTION INTRAMUSCULAR; INTRAVENOUS; SUBCUTANEOUS at 05:06

## 2025-06-30 RX ADMIN — MUPIROCIN 1 G: 20 OINTMENT TOPICAL at 09:06

## 2025-06-30 RX ADMIN — HYDROMORPHONE HYDROCHLORIDE 2 MG: 2 INJECTION INTRAMUSCULAR; INTRAVENOUS; SUBCUTANEOUS at 11:06

## 2025-06-30 RX ADMIN — DIPHENHYDRAMINE HYDROCHLORIDE 50 MG: 50 INJECTION INTRAMUSCULAR; INTRAVENOUS at 11:06

## 2025-06-30 RX ADMIN — OXYCODONE HYDROCHLORIDE AND ACETAMINOPHEN 500 MG: 500 TABLET ORAL at 08:06

## 2025-06-30 RX ADMIN — HYDROMORPHONE HYDROCHLORIDE 2 MG: 2 INJECTION INTRAMUSCULAR; INTRAVENOUS; SUBCUTANEOUS at 02:06

## 2025-06-30 RX ADMIN — DIPHENHYDRAMINE HYDROCHLORIDE 50 MG: 50 INJECTION INTRAMUSCULAR; INTRAVENOUS at 02:06

## 2025-06-30 RX ADMIN — HYDROMORPHONE HYDROCHLORIDE 2 MG: 2 INJECTION INTRAMUSCULAR; INTRAVENOUS; SUBCUTANEOUS at 08:06

## 2025-06-30 RX ADMIN — MUPIROCIN: 20 OINTMENT TOPICAL at 08:06

## 2025-06-30 RX ADMIN — GABAPENTIN 600 MG: 300 CAPSULE ORAL at 02:06

## 2025-06-30 RX ADMIN — DIPHENHYDRAMINE HYDROCHLORIDE 25 MG: 50 INJECTION INTRAMUSCULAR; INTRAVENOUS at 04:06
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
## 2025-06-30 NOTE — PT/OT/SLP EVAL
Physical Therapy Evaluation    Patient Name:  Nazanin Malone   MRN:  [id number]    Recommendations:     Discharge Recommendations: Low Intensity Therapy   Discharge Equipment Recommendations: rollator   Barriers to discharge: none anticipated at this time    Assessment:     Nazanin Malone is a 45 y.o. female admitted with a medical diagnosis of Vaso-occlusive pain due to sickle cell disease.  She presents with the following impairments/functional limitations: weakness, impaired endurance, impaired functional mobility, gait instability, impaired balance, decreased coordination, decreased lower extremity function, pain, decreased ROM .pt with foot drop on R foot (PLOF) and decreased endurance. Pt currently limited by RLE pain and weakness. Currently performing below baseline and would benefit from acute PT services to improve fxnl mobility prior to dc. .    Rehab Prognosis: Good; patient would benefit from acute skilled PT services to address these deficits and reach maximum level of function.    Recent Surgery: * No surgery found *      Plan:     During this hospitalization, patient to be seen 2 x/week to address the identified rehab impairments via gait training, therapeutic activities, therapeutic exercises, neuromuscular re-education and progress toward the following goals:    Plan of Care Expires:  07/14/25    Subjective   Co-tx with OT  Chief Complaint: L hip and R knee pain  Patient/Family Comments/goals: agreeable to participate w PT  Pain/Comfort:  Pain Rating 1: 10/10  Location - Side 1: Left  Location 1: hip  Pain Addressed 1: Reposition, Distraction  Pain Rating 2: 8/10  Location - Side 2: Right  Location 2: knee  Pain Addressed 2: Distraction, Reposition    Patients cultural, spiritual, Mormonism conflicts given the current situation: no    Living Environment:  Lives in a [facility] with 18yr old child and her mom (she is mom's caretaker). 3STE w 1HR.   Prior to admission, patients level of function was  independent.  Equipment used at home: none.  DME owned (not currently used): none.  Upon discharge, patient will have assistance from 18yr old.    Objective:     Communicated with FRANCIS Millan prior to session.  Patient found supine with peripheral IV, oxygen  upon PT entry to room.    General Precautions: Standard,    Orthopedic Precautions:N/A   Braces: N/A  Respiratory Status: Nasal cannula    Exams:  RLE ROM: WFL except lacking DF past neutral  RLE Strength: WFL except lacking full ROM DF  LLE ROM: WFL  LLE Strength: WFL    Functional Mobility:  Bed Mobility:  Rolling Right: supervision  Scooting: supervision  Supine to Sit: supervision  Sit to Supine: supervision  Transfers:  Sit to Stand:  supervision with no AD  Gait: 300' SBA-SPV and no AD      AM-PAC 6 CLICK MOBILITY  Total Score:22       Treatment & Education:  Educated on PT role, PT POC.  Amb 300' w no AD and SBA-SPV. R foot drop noted.     Patient left supine with all lines intact and call button in reach.    GOALS:   Multidisciplinary Problems       Physical Therapy Goals          Problem: Physical Therapy    Goal Priority Disciplines Outcome Interventions   Physical Therapy Goal     PT, PT/OT Progressing    Description: P.T goals to be met in 2 weeks as follows:  1. Independent w bed mobility  2. Independent w t/f  3. Gait 500' MOD I with LRAD  4. I with HEP  5. Pt will ascend/descend 4 steps with Mod I                         DME Justifications:   Nazanin's mobility limitation cannot be sufficiently resolved by the use of a cane. Her functional mobility deficit can be sufficiently resolved with the use of a Rollator. Patient's mobility limitation significantly impairs their ability to participate in one of more activities of daily living.  The use of a Rollator will significantly improve the patient's ability to participate in MRADLS and the patient will use it on regular basis in the home.      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    Acute venous embolism and thrombosis of internal jugular veins     Asthma     Avascular necrosis of femur     Depression     History of pulmonary embolism     Hypertension     Morbid obesity     Opioid dependence 2017    Pneumonia due to Streptococcus pneumoniae 2017    Right lower lobe pneumonia 2017    Sepsis due to Streptococcus pneumoniae 2017    Sickle cell-beta thalassemia disease with pain     Trigeminal neuralgia        Past Surgical History:   Procedure Laterality Date     SECTION      ESOPHAGOGASTRODUODENOSCOPY N/A 2024    Procedure: EGD (ESOPHAGOGASTRODUODENOSCOPY);  Surgeon: Allen Marshall MD;  Location: [address]);  Service: Gastroenterology;  Laterality: N/A;    TONSILLECTOMY      TUBAL LIGATION         Time Tracking:     PT Received On: 25  PT Start Time: 1039     PT Stop Time: 1057  PT Total Time (min): 18 min     Billable Minutes: Evaluation 18      2025
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
## 2025-06-30 NOTE — PLAN OF CARE
Problem: Adult Inpatient Plan of Care  Goal: Patient-Specific Goal (Individualized)  Outcome: Progressing     Problem: Adult Inpatient Plan of Care  Goal: Readiness for Transition of Care  Outcome: Progressing     Problem: Adult Inpatient Plan of Care  Goal: Optimal Comfort and Wellbeing  Outcome: Progressing     Problem: Bariatric Environmental Safety  Goal: Safety Maintained with Care  Outcome: Progressing     Problem: Infection  Goal: Absence of Infection Signs and Symptoms  Outcome: Progressing     Problem: Wound  Goal: Optimal Coping  Outcome: Progressing     Problem: Wound  Goal: Optimal Functional Ability  Outcome: Progressing     Problem: Wound  Goal: Absence of Infection Signs and Symptoms  Outcome: Progressing

## 2025-06-30 NOTE — PT/OT/SLP EVAL
Occupational Therapy   Evaluation and Discharge Note    Name: Nazanin Malone  MRN: [id number]  Admitting Diagnosis: Vaso-occlusive pain due to sickle cell disease  Recent Surgery: * No surgery found *      Recommendations:     Discharge Recommendations: Low Intensity Therapy (preference for OP OT for scar tissue management RLE)  Discharge Equipment Recommendations: bath bench, rollator  Barriers to discharge:  Decreased caregiver support    Assessment:     Nazanin Malone is a 45 y.o. female with a medical diagnosis of Vaso-occlusive pain due to sickle cell disease. At this time, patient is functioning at their prior level of function and does not require further acute OT services.     Plan:     During this hospitalization, patient does not require further acute OT services.  Please re-consult if situation changes.    Plan of Care Reviewed with: patient    Subjective     Chief Complaint: Pain, but tolerated session very well, reports she would feel better with use of DME to ambulate as she uses touch points and is unable to ambulate with items in B hands and also demonstrated SOB while ambulating  Patient/Family Comments/goals: To return to [facility]    Occupational Profile:  Living Environment: Pt lives with mother (alzheimer's, pt is caretaker) and 18 year old child SSH, 0STE, tub/sh  Previous level of function: Indep ADLs and functional mobility   Roles and Routines: Caretaker to self and home. Cooks, cleans, drives, completes own grocery shopping. 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 [employer]  Equipment Used at home: none  Assistance upon Discharge: Family, 18 year old does not work and is a capable caretaker if needed, assists with pt's mother    Pain/Comfort:  Pain Rating 1: 10/10  Location - Side 1: Left  Location - Orientation 1: generalized  Location 1: hip  Pain Addressed 1: Reposition, Distraction, Cessation of Activity, Nurse notified, Pre-medicate for activity  Pain Rating Post-Intervention 1:   (did not rate)  Pain Rating 2: 8/10  Location - Side 2: Right  Location - Orientation 2: generalized  Location 2: knee  Pain Addressed 2: Distraction, Pre-medicate for activity, Reposition, Cessation of Activity, Nurse notified  Pain Rating Post-Intervention 2:  (did not rate)    Patients cultural, spiritual, Yazidism conflicts given the current situation:      Objective:     Communicated with: doc prior to session.  Patient found HOB elevated with oxygen, peripheral IV upon OT entry to room.    General Precautions: Standard, fall  Orthopedic Precautions: N/A  Braces: N/A  Respiratory Status: Room air     Occupational Performance:    Bed Mobility:    Patient completed Rolling/Turning to Right with modified independence  Patient completed Scooting/Bridging with modified independence  Patient completed Supine to Sit with modified independence  Patient completed Sit to Supine with modified independence    Functional Mobility/Transfers:  Patient completed Sit <> Stand Transfer with modified independence  with  no assistive device   Functional Mobility: Pt with fair to fair- dynamic seated and standing balance. Use of touch points and SOB noted in ambulation in hallway but fairly good endurance    Activities of Daily Living:  Lower Body Dressing: independence to don/doff B socks seated EOB    Cognitive/Visual Perceptual:  Cognitive/Psychosocial Skills:     -       Oriented to: Person, Place, Time and Situation   -       Follows Commands/attention:Follows multistep  commands  -       Communication: clear/fluent  -       Memory: No Deficits noted  -       Safety awareness/insight to disability: intact   -       Mood/Affect/Coping skills/emotional control: Appropriate to situation    Physical Exam:  Postural examination/scapula alignment:    -       No postural abnormalities identified  Skin integrity: Visible skin intact  Sensation:    -       Intact  Motor Planning:    -       WFL  Dominant hand:    -       R  handed  Upper Extremity Range of Motion: BUE WFL     Upper Extremity Strength:  BUE WFL   Strength:  B hands WFL  Fine Motor Coordination:    -       Intact  Gross motor coordination:   WFL     AMPAC 6 Click ADL:  AMPAC Total Score: 24    Treatment & Education:  Pt educated on role of OT and POC.   Pt performing skills as listed above.     Patient left HOB elevated with all lines intact, call button in reach, and nsg notified    GOALS:   Multidisciplinary Problems       Occupational Therapy Goals       Not on file              Multidisciplinary Problems (Resolved)          Problem: Occupational Therapy    Goal Priority Disciplines Outcome Interventions   Occupational Therapy Goal   (Resolved)     OT, PT/OT Met    Description: No goals set 2/2 pt performing at/near baseline.                           DME Justifications:   Nazanin's mobility limitation cannot be sufficiently resolved by the use of a cane. Her functional mobility deficit can be sufficiently resolved with the use of a Rollator. Patient's mobility limitation significantly impairs their ability to participate in one of more activities of daily living.  The use of a Rollator will significantly improve the patient's ability to participate in MRADLS and the patient will use it on regular basis in the home.      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/2017    Acute venous embolism and thrombosis of internal jugular veins     Asthma     Avascular necrosis of femur     Depression     History of pulmonary embolism     Hypertension     Morbid obesity     Opioid dependence 04/16/2017    Pneumonia due to Streptococcus pneumoniae 04/25/2017    Right lower lobe pneumonia 04/29/2017    Sepsis due to Streptococcus pneumoniae 04/25/2017    Sickle cell-beta thalassemia disease with pain     Trigeminal neuralgia          Past Surgical History:    Procedure Laterality Date     SECTION      ESOPHAGOGASTRODUODENOSCOPY N/A 2024    Procedure: EGD (ESOPHAGOGASTRODUODENOSCOPY);  Surgeon: Allen Marshall MD;  Location: [address];  Service: Gastroenterology;  Laterality: N/A;    TONSILLECTOMY      TUBAL LIGATION         Time Tracking:     OT Date of Treatment: 25  OT Start Time: 1038  OT Stop Time: 1058  OT Total Time (min): 20 min    Billable Minutes:Evaluation 10  Therapeutic Activity 10    2025
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
## 2025-06-30 NOTE — PLAN OF CARE
Problem: Occupational Therapy  Goal: Occupational Therapy Goal  Description: No goals set 2/2 pt performing at/near baseline.      Outcome: Met    Pt would benefit from continued OT to address deficits in self care and functional mobility. Recommending low intensity therapy, preference for OP OT for scar tissue management RLE; DME needs rollator, TTB.

## 2025-06-30 NOTE — PLAN OF CARE
D/C Recommendation- Low Intensity Therapy  DME Recommendation- Rollator    Eval completed; pt with foot drop on R foot (PLOF) and decreased endurance. Pt currently limited by RLE pain and weakness. Currently performing below baseline and would benefit from acute PT services to improve fxnl mobility prior to dc.     Problem: Physical Therapy  Goal: Physical Therapy Goal  Description: P.T goals to be met in 2 weeks as follows:  1. Independent w bed mobility  2. Independent w t/f  3. Gait 500' MOD I with LRAD  4. I with HEP  5. Pt will ascend/descend 4 steps with Mod I    Outcome: Progressing

## 2025-06-30 NOTE — SUBJECTIVE & OBJECTIVE
Interval History: Patient having increased pain, medications changed back to q4 last night as she was reported to be somnolent.  Adjusting Benadryl to q6 at a higher dose and Dilaudid back to q3.  Discussed MRI results at length.    Review of Systems   Constitutional:  Negative for chills and fever.   Respiratory:  Negative for cough and shortness of breath.    Cardiovascular:  Negative for chest pain and palpitations.   Musculoskeletal:  Positive for arthralgias, gait problem, joint swelling and myalgias.     Objective:     Vital Signs (Most Recent):  Temp: 99.4 °F (37.4 °C) (06/30/25 0803)  Pulse: 87 (06/30/25 0803)  Resp: 16 (06/30/25 0821)  BP: 122/62 (06/30/25 0803)  SpO2: 97 % (06/30/25 0803) Vital Signs (24h Range):  Temp:  [98 °F (36.7 °C)-99.4 °F (37.4 °C)] 99.4 °F (37.4 °C)  Pulse:  [71-92] 87  Resp:  [16-18] 16  SpO2:  [91 %-100 %] 97 %  BP: ()/(50-77) 122/62     Weight: 99.8 kg (220 lb)  Body mass index is 40.24 kg/m².    Intake/Output Summary (Last 24 hours) at 6/30/2025 0925  Last data filed at 6/30/2025 0425  Gross per 24 hour   Intake 840 ml   Output 1000 ml   Net -160 ml         Physical Exam  Cardiovascular:      Rate and Rhythm: Normal rate and regular rhythm.      Heart sounds: Normal heart sounds. No murmur heard.     No gallop.   Pulmonary:      Effort: Pulmonary effort is normal.      Breath sounds: Normal breath sounds.   Abdominal:      General: Bowel sounds are normal.      Palpations: Abdomen is soft.   Musculoskeletal:      Comments: Decreased ROM right knee.  Large scar right lateral calf.   Skin:     General: Skin is warm and dry.   Neurological:      General: No focal deficit present.      Mental Status: She is alert.   Psychiatric:         Mood and Affect: Mood normal.         Behavior: Behavior normal.               Significant Labs: All pertinent labs within the past 24 hours have been reviewed.    Significant Imaging: I have reviewed all pertinent imaging results/findings  within the past 24 hours.
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
## 2025-07-01 ENCOUNTER — TELEPHONE (OUTPATIENT)
Dept: HEMATOLOGY/ONCOLOGY | Facility: CLINIC | Age: 45
End: 2025-07-01
Payer: MEDICARE

## 2025-07-01 LAB
ABSOLUTE EOSINOPHIL (OHS): 0.38 K/UL
ABSOLUTE MONOCYTE (OHS): 0.82 K/UL (ref 0.3–1)
ABSOLUTE NEUTROPHIL COUNT (OHS): 1.86 K/UL (ref 1.8–7.7)
BASOPHILS # BLD AUTO: 0.03 K/UL
BASOPHILS NFR BLD AUTO: 0.5 %
ERYTHROCYTE [DISTWIDTH] IN BLOOD BY AUTOMATED COUNT: 26.9 % (ref 11.5–14.5)
HCT VFR BLD AUTO: 25.4 % (ref 37–48.5)
HGB BLD-MCNC: 8.1 GM/DL (ref 12–16)
IMM GRANULOCYTES # BLD AUTO: 0.01 K/UL (ref 0–0.04)
IMM GRANULOCYTES NFR BLD AUTO: 0.2 % (ref 0–0.5)
INR PPP: 3.3 (ref 0.8–1.2)
LYMPHOCYTES # BLD AUTO: 3.28 K/UL (ref 1–4.8)
MCH RBC QN AUTO: 23.7 PG (ref 27–31)
MCHC RBC AUTO-ENTMCNC: 31.9 G/DL (ref 32–36)
MCV RBC AUTO: 74 FL (ref 82–98)
NUCLEATED RBC (/100WBC) (OHS): 1 /100 WBC
PLATELET # BLD AUTO: 275 K/UL (ref 150–450)
PMV BLD AUTO: 9.5 FL (ref 9.2–12.9)
PROTHROMBIN TIME: 33.8 SECONDS (ref 9–12.5)
RBC # BLD AUTO: 3.42 M/UL (ref 4–5.4)
RELATIVE EOSINOPHIL (OHS): 6 %
RELATIVE LYMPHOCYTE (OHS): 51.4 % (ref 18–48)
RELATIVE MONOCYTE (OHS): 12.9 % (ref 4–15)
RELATIVE NEUTROPHIL (OHS): 29 % (ref 38–73)
WBC # BLD AUTO: 6.38 K/UL (ref 3.9–12.7)

## 2025-07-01 PROCEDURE — 85610 PROTHROMBIN TIME: CPT | Performed by: NURSE PRACTITIONER

## 2025-07-01 PROCEDURE — 11000001 HC ACUTE MED/SURG PRIVATE ROOM

## 2025-07-01 PROCEDURE — 21400001 HC TELEMETRY ROOM

## 2025-07-01 PROCEDURE — 94761 N-INVAS EAR/PLS OXIMETRY MLT: CPT

## 2025-07-01 PROCEDURE — 63600175 PHARM REV CODE 636 W HCPCS: Performed by: HOSPITALIST

## 2025-07-01 PROCEDURE — 25000003 PHARM REV CODE 250: Performed by: HOSPITALIST

## 2025-07-01 PROCEDURE — 97116 GAIT TRAINING THERAPY: CPT | Mod: CQ

## 2025-07-01 PROCEDURE — 85025 COMPLETE CBC W/AUTO DIFF WBC: CPT | Performed by: NURSE PRACTITIONER

## 2025-07-01 PROCEDURE — 25000003 PHARM REV CODE 250: Performed by: EMERGENCY MEDICINE

## 2025-07-01 PROCEDURE — 25000003 PHARM REV CODE 250: Performed by: STUDENT IN AN ORGANIZED HEALTH CARE EDUCATION/TRAINING PROGRAM

## 2025-07-01 PROCEDURE — 63600175 PHARM REV CODE 636 W HCPCS: Performed by: EMERGENCY MEDICINE

## 2025-07-01 PROCEDURE — 25000003 PHARM REV CODE 250: Performed by: NURSE PRACTITIONER

## 2025-07-01 RX ORDER — WARFARIN 2 MG/1
4 TABLET ORAL DAILY
Status: DISCONTINUED | OUTPATIENT
Start: 2025-07-01 | End: 2025-07-01

## 2025-07-01 RX ADMIN — HYDROMORPHONE HYDROCHLORIDE 2 MG: 2 INJECTION INTRAMUSCULAR; INTRAVENOUS; SUBCUTANEOUS at 08:07

## 2025-07-01 RX ADMIN — AMLODIPINE BESYLATE 10 MG: 5 TABLET ORAL at 08:07

## 2025-07-01 RX ADMIN — TIZANIDINE 4 MG: 4 TABLET ORAL at 11:07

## 2025-07-01 RX ADMIN — MUPIROCIN: 20 OINTMENT TOPICAL at 08:07

## 2025-07-01 RX ADMIN — GABAPENTIN 600 MG: 300 CAPSULE ORAL at 08:07

## 2025-07-01 RX ADMIN — OXYCODONE HYDROCHLORIDE AND ACETAMINOPHEN 500 MG: 500 TABLET ORAL at 08:07

## 2025-07-01 RX ADMIN — FLUOXETINE HYDROCHLORIDE 80 MG: 20 CAPSULE ORAL at 08:07

## 2025-07-01 RX ADMIN — DIPHENHYDRAMINE HYDROCHLORIDE 50 MG: 50 INJECTION INTRAMUSCULAR; INTRAVENOUS at 11:07

## 2025-07-01 RX ADMIN — MUPIROCIN 1 G: 20 OINTMENT TOPICAL at 08:07

## 2025-07-01 RX ADMIN — DIPHENHYDRAMINE HYDROCHLORIDE 50 MG: 50 INJECTION INTRAMUSCULAR; INTRAVENOUS at 05:07

## 2025-07-01 RX ADMIN — SODIUM CHLORIDE: 9 INJECTION, SOLUTION INTRAVENOUS at 06:07

## 2025-07-01 RX ADMIN — MORPHINE SULFATE 30 MG: 30 TABLET, FILM COATED, EXTENDED RELEASE ORAL at 08:07

## 2025-07-01 RX ADMIN — POLYETHYLENE GLYCOL 3350 17 G: 17 POWDER, FOR SOLUTION ORAL at 08:07

## 2025-07-01 RX ADMIN — OXYCODONE HYDROCHLORIDE 5 MG: 5 TABLET ORAL at 09:07

## 2025-07-01 RX ADMIN — OXYCODONE HYDROCHLORIDE 5 MG: 5 TABLET ORAL at 02:07

## 2025-07-01 RX ADMIN — Medication 6 MG: at 08:07

## 2025-07-01 RX ADMIN — HYDROMORPHONE HYDROCHLORIDE 2 MG: 2 INJECTION INTRAMUSCULAR; INTRAVENOUS; SUBCUTANEOUS at 02:07

## 2025-07-01 RX ADMIN — GABAPENTIN 600 MG: 300 CAPSULE ORAL at 02:07

## 2025-07-01 RX ADMIN — WARFARIN SODIUM 3 MG: 2 TABLET ORAL at 05:07

## 2025-07-01 RX ADMIN — HYDROMORPHONE HYDROCHLORIDE 2 MG: 2 INJECTION INTRAMUSCULAR; INTRAVENOUS; SUBCUTANEOUS at 05:07

## 2025-07-01 RX ADMIN — PANTOPRAZOLE SODIUM 40 MG: 40 TABLET, DELAYED RELEASE ORAL at 08:07

## 2025-07-01 RX ADMIN — ONDANSETRON 4 MG: 2 INJECTION INTRAMUSCULAR; INTRAVENOUS at 08:07

## 2025-07-01 RX ADMIN — HYDROMORPHONE HYDROCHLORIDE 2 MG: 2 INJECTION INTRAMUSCULAR; INTRAVENOUS; SUBCUTANEOUS at 03:07

## 2025-07-01 RX ADMIN — HYDROMORPHONE HYDROCHLORIDE 2 MG: 2 INJECTION INTRAMUSCULAR; INTRAVENOUS; SUBCUTANEOUS at 11:07

## 2025-07-01 RX ADMIN — PRAZOSIN HYDROCHLORIDE 1 MG: 1 CAPSULE ORAL at 08:07

## 2025-07-01 RX ADMIN — FOLIC ACID 1 MG: 1 TABLET ORAL at 08:07

## 2025-07-01 NOTE — PT/OT/SLP PROGRESS
Physical Therapy Treatment    Patient Name:  Nazanin Malone   MRN:  [id number]    Recommendations:     Discharge Recommendations: Low Intensity Therapy  Discharge Equipment Recommendations: rollator  See PT note dated 6/30 for DME recommendations and justifications ; Also, pt would benefit from AFO for RLE foot drop, (has an old pressure injury along lateral calf from 1 1/2 years ago that is healed, but pt was never fitted for an AFO due to early healing issues).   Barriers to discharge: None    Assessment:     Nazanin Malone is a 45 y.o. female admitted with a medical diagnosis of Vaso-occlusive pain due to sickle cell disease.  She presents with the following impairments/functional limitations: weakness, impaired functional mobility, gait instability, impaired balance, decreased lower extremity function, decreased ROM ;pt with good mobility today, amb'd ~50' w/ SBA only, though R foot drop noted, pt then amb'd ~300' w/ rollator and SBA/Sup.    Rehab Prognosis: Good; patient would benefit from acute skilled PT services to address these deficits and reach maximum level of function.    Recent Surgery: * No surgery found *      Plan:     During this hospitalization, patient to be seen 2 x/week to address the identified rehab impairments via gait training, therapeutic activities, therapeutic exercises, neuromuscular re-education and progress toward the following goals:    Plan of Care Expires:  07/14/25    Subjective     Chief Complaint: no c/o's pain at this time  Patient/Family Comments/goals: pt agreeable to session, reporting she does trip and fall a lot at home due to her R foot.   Pain/Comfort:  Pain Rating 1:  (pt did not c/o pain today during session)  Pain Rating Post-Intervention 1:  (pt appeared comfortable at rest)      Objective:     Communicated with nurse prior to session.  Patient found HOB elevated with peripheral IV upon PT entry to room.     General Precautions: Standard, fall  Orthopedic  "Precautions: N/A  Braces:  (pt would benefit from an AFO for RLE foot drop)  Respiratory Status: Room air     Functional Mobility:  Bed Mobility:     Supine to Sit: modified independence  Transfers:     Sit to Stand:  modified independence with bedrail  Gait: amb'd ~50' w/o AD w/ SBA, though R foot drop noted and pt occasionally "catching" her toes on floor, pt then amb'd ~300' w/ rollator and SBA/Sup. (Pt would benefit from RLE AFO)  Stairs: pt asc/desc 8 steps w/ 1 rail and CGA/SBA.      AM-PAC 6 CLICK MOBILITY  Turning over in bed (including adjusting bedclothes, sheets and blankets)?: 4  Sitting down on and standing up from a chair with arms (e.g., wheelchair, bedside commode, etc.): 4  Moving from lying on back to sitting on the side of the bed?: 4  Moving to and from a bed to a chair (including a wheelchair)?: 4  Need to walk in hospital room?: 3  Climbing 3-5 steps with a railing?: 3  Basic Mobility Total Score: 22       Treatment & Education:  Pt educated on safety, benefits of an AFO, pt agreeable to wearing one if ordered.     Patient left sitting edge of bed with all lines intact and call button in reach..    GOALS:   Multidisciplinary Problems       Physical Therapy Goals          Problem: Physical Therapy    Goal Priority Disciplines Outcome Interventions   Physical Therapy Goal     PT, PT/OT Progressing    Description: P.T goals to be met in 2 weeks as follows:  1. Independent w bed mobility  2. Independent w t/f  3. Gait 500' MOD I with LRAD  4. I with HEP  5. Pt will ascend/descend 4 steps with Mod I                         DME Justifications:   Nazanin's mobility limitation cannot be sufficiently resolved by the use of a cane. Her functional mobility deficit can be sufficiently resolved with the use of a Rollator. Patient's mobility limitation significantly impairs their ability to participate in one of more activities of daily living.  The use of a Rollator will significantly improve the " patient's ability to participate in MRADLS and the patient will use it on regular basis in the home.      Time Tracking:     PT Received On: 07/01/25  PT Start Time: 1150     PT Stop Time: 1215  PT Total Time (min): 25 min     Billable Minutes: Gait Training 25    Treatment Type: Treatment  PT/PTA: PTA     Number of PTA visits since last PT visit: 1 07/01/2025

## 2025-07-01 NOTE — ASSESSMENT & PLAN NOTE
History of pulmonary embolism    Lab Results   Component Value Date    INR 3.0 (H) 06/28/2025                PROTIME 31.6 (H) 06/28/2025                 Daily PT/INR  7/1 - Restart coumadin at 3mg

## 2025-07-01 NOTE — PLAN OF CARE
DARRION met with pt at bedside this AM to complete DC Reassessment. Pt was in a pleasant mood throughout assessment. Pt stated that she lives at home with her family. Pt stated that she likely will need transportation home at time of dc, SW informed pt that she would not be dc today and encouraged pt to see if any family would be available to help transport her home once medically ready. Pt does not use any HME at home and is fully independent in her ADL. Pt does not currently receive any HH or HD services. SW discussed PT/OT recommendations for HH and a Rollator. Pt is agreeable to dc with a rollator. Pt also did not have a preference in HH company and agreed to have sw sent HH referrals to [facility] to see if they could accept the pt. Pt encouraged to call with any questions or concerns.  Cm will continue to follow pt through transitions of care and assist with any discharge needs.    Met with Patient to review discharge recommendation of HH and is agreeable to plan    Patient/family provided list of facilities in-network with patient's payor plan. Providers that are owned, operated, or affiliated with [facility] are included on the list.     Notified that referral sent to below listed facilities from in-network list based on proximity to home/family support:   1.[facility]    Patient/family instructed to identify preference.    Preferred Facility: (if more than 1, listed in order of descending preference)  1.  OR  Patient has declined to select a preferred provider and elects placement with the first accepting in network provider that is available to provide services as ordered by the physician       If an additional preferred facility not listed above is identified, additional referral to be sent. If above facilities unable to accept, will send additional referrals to in-network providers.       ADRIEN Kruse  [phone]    Future Appointments   Date Time Provider Department Center   7/7/2025 11:20 AM Lillian  MD [facility]   7/17/2025  8:00 AM Vijaya Soni MD [facility]          07/01/25 0959   Discharge Reassessment   Assessment Type Discharge Planning Reassessment   Did the patient's condition or plan change since previous assessment? No   Discharge Plan discussed with: Patient   Communicated ALYSE with patient/caregiver Yes   Discharge Plan A Home Health   DME Needed Upon Discharge  other (see comments)  (TBD)   Transition of Care Barriers None   Why the patient remains in the hospital Requires continued medical care   Post-Acute Status   Post-Acute Authorization Home Health   Home Health Status Referrals Sent   Hospital Resources/Appts/Education Provided Appointments scheduled and added to AVS   Discharge Delays None known at this time
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
## 2025-07-01 NOTE — TELEPHONE ENCOUNTER
Spoke with Timoteo(daughter) regarding patients virtual visit scheduled for today. Patient is still in the hospital and will call to reschedule once discharged per patients daughter.
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
## 2025-07-01 NOTE — TELEPHONE ENCOUNTER
Rescheduled patient to 7/17 due to current hospitalization as we are unable to see them until they are discharged. Patient unable to have refills sen tin until discharged
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
## 2025-07-01 NOTE — SUBJECTIVE & OBJECTIVE
Interval History: Still with pain in hips and knee. Discussed MRI findings again.     Review of Systems   Constitutional:  Negative for chills and fever.   Respiratory:  Negative for cough and shortness of breath.    Cardiovascular:  Negative for chest pain and palpitations.   Musculoskeletal:  Positive for arthralgias, gait problem, joint swelling and myalgias.     Objective:     Vital Signs (Most Recent):  Temp: 98.5 °F (36.9 °C) (07/01/25 0748)  Pulse: 77 (07/01/25 0748)  Resp: 18 (07/01/25 0959)  BP: 125/71 (07/01/25 0748)  SpO2: 96 % (07/01/25 0748) Vital Signs (24h Range):  Temp:  [98.4 °F (36.9 °C)-99 °F (37.2 °C)] 98.5 °F (36.9 °C)  Pulse:  [76-90] 77  Resp:  [17-20] 18  SpO2:  [94 %-97 %] 96 %  BP: (100-125)/(60-89) 125/71     Weight: 99.8 kg (220 lb)  Body mass index is 40.24 kg/m².    Intake/Output Summary (Last 24 hours) at 7/1/2025 1020  Last data filed at 7/1/2025 0959  Gross per 24 hour   Intake 640 ml   Output 2900 ml   Net -2260 ml         Physical Exam  Constitutional:       General: She is not in acute distress.  Pulmonary:      Effort: No respiratory distress.      Breath sounds: No wheezing.   Abdominal:      General: There is no distension.      Tenderness: There is no abdominal tenderness.   Musculoskeletal:      Right lower leg: No edema.      Left lower leg: No edema.      Comments: Decreased ROM right knee.  Large scar right lateral calf.    Neurological:      General: No focal deficit present.      Mental Status: She is oriented to person, place, and time.               Significant Labs: All pertinent labs within the past 24 hours have been reviewed.    Significant Imaging: I have reviewed all pertinent imaging results/findings within the past 24 hours.

## 2025-07-02 LAB
ALBUMIN SERPL BCP-MCNC: 3.1 G/DL (ref 3.5–5.2)
ALP SERPL-CCNC: 81 UNIT/L (ref 40–150)
ALT SERPL W/O P-5'-P-CCNC: 20 UNIT/L (ref 10–44)
ANION GAP (OHS): 7 MMOL/L (ref 8–16)
AST SERPL-CCNC: 23 UNIT/L (ref 11–45)
BILIRUB SERPL-MCNC: 0.4 MG/DL (ref 0.1–1)
BUN SERPL-MCNC: 8 MG/DL (ref 6–20)
CALCIUM SERPL-MCNC: 9 MG/DL (ref 8.7–10.5)
CHLORIDE SERPL-SCNC: 105 MMOL/L (ref 95–110)
CO2 SERPL-SCNC: 29 MMOL/L (ref 23–29)
CREAT SERPL-MCNC: 1 MG/DL (ref 0.5–1.4)
ERYTHROCYTE [DISTWIDTH] IN BLOOD BY AUTOMATED COUNT: 27 % (ref 11.5–14.5)
GFR SERPLBLD CREATININE-BSD FMLA CKD-EPI: >60 ML/MIN/1.73/M2
GLUCOSE SERPL-MCNC: 99 MG/DL (ref 70–110)
HCT VFR BLD AUTO: 26.7 % (ref 37–48.5)
HGB BLD-MCNC: 8.5 GM/DL (ref 12–16)
INR PPP: 2.2 (ref 0.8–1.2)
MCH RBC QN AUTO: 23.4 PG (ref 27–31)
MCHC RBC AUTO-ENTMCNC: 31.8 G/DL (ref 32–36)
MCV RBC AUTO: 73 FL (ref 82–98)
PLATELET # BLD AUTO: 263 K/UL (ref 150–450)
PMV BLD AUTO: 9.6 FL (ref 9.2–12.9)
POTASSIUM SERPL-SCNC: 4.2 MMOL/L (ref 3.5–5.1)
PROT SERPL-MCNC: 6.9 GM/DL (ref 6–8.4)
PROTHROMBIN TIME: 23 SECONDS (ref 9–12.5)
RBC # BLD AUTO: 3.64 M/UL (ref 4–5.4)
SODIUM SERPL-SCNC: 141 MMOL/L (ref 136–145)
WBC # BLD AUTO: 6.65 K/UL (ref 3.9–12.7)

## 2025-07-02 PROCEDURE — 85027 COMPLETE CBC AUTOMATED: CPT | Performed by: STUDENT IN AN ORGANIZED HEALTH CARE EDUCATION/TRAINING PROGRAM

## 2025-07-02 PROCEDURE — 11000001 HC ACUTE MED/SURG PRIVATE ROOM

## 2025-07-02 PROCEDURE — 25000003 PHARM REV CODE 250: Performed by: HOSPITALIST

## 2025-07-02 PROCEDURE — 85610 PROTHROMBIN TIME: CPT | Performed by: NURSE PRACTITIONER

## 2025-07-02 PROCEDURE — 82040 ASSAY OF SERUM ALBUMIN: CPT | Performed by: STUDENT IN AN ORGANIZED HEALTH CARE EDUCATION/TRAINING PROGRAM

## 2025-07-02 PROCEDURE — 21400001 HC TELEMETRY ROOM

## 2025-07-02 PROCEDURE — 63600175 PHARM REV CODE 636 W HCPCS: Performed by: EMERGENCY MEDICINE

## 2025-07-02 PROCEDURE — 94761 N-INVAS EAR/PLS OXIMETRY MLT: CPT

## 2025-07-02 PROCEDURE — 25000003 PHARM REV CODE 250: Performed by: STUDENT IN AN ORGANIZED HEALTH CARE EDUCATION/TRAINING PROGRAM

## 2025-07-02 PROCEDURE — 25000003 PHARM REV CODE 250: Performed by: EMERGENCY MEDICINE

## 2025-07-02 PROCEDURE — 63600175 PHARM REV CODE 636 W HCPCS: Performed by: HOSPITALIST

## 2025-07-02 PROCEDURE — 25000003 PHARM REV CODE 250: Performed by: NURSE PRACTITIONER

## 2025-07-02 RX ORDER — AMOXICILLIN 250 MG
1 CAPSULE ORAL DAILY
Status: DISCONTINUED | OUTPATIENT
Start: 2025-07-02 | End: 2025-07-04 | Stop reason: HOSPADM

## 2025-07-02 RX ADMIN — HYDROMORPHONE HYDROCHLORIDE 2 MG: 2 INJECTION INTRAMUSCULAR; INTRAVENOUS; SUBCUTANEOUS at 11:07

## 2025-07-02 RX ADMIN — HYDROMORPHONE HYDROCHLORIDE 2 MG: 2 INJECTION INTRAMUSCULAR; INTRAVENOUS; SUBCUTANEOUS at 06:07

## 2025-07-02 RX ADMIN — PANTOPRAZOLE SODIUM 40 MG: 40 TABLET, DELAYED RELEASE ORAL at 08:07

## 2025-07-02 RX ADMIN — ONDANSETRON 4 MG: 2 INJECTION INTRAMUSCULAR; INTRAVENOUS at 08:07

## 2025-07-02 RX ADMIN — GABAPENTIN 600 MG: 300 CAPSULE ORAL at 08:07

## 2025-07-02 RX ADMIN — MORPHINE SULFATE 30 MG: 30 TABLET, FILM COATED, EXTENDED RELEASE ORAL at 09:07

## 2025-07-02 RX ADMIN — FOLIC ACID 1 MG: 1 TABLET ORAL at 08:07

## 2025-07-02 RX ADMIN — HYDROMORPHONE HYDROCHLORIDE 2 MG: 2 INJECTION INTRAMUSCULAR; INTRAVENOUS; SUBCUTANEOUS at 09:07

## 2025-07-02 RX ADMIN — OXYCODONE HYDROCHLORIDE AND ACETAMINOPHEN 500 MG: 500 TABLET ORAL at 08:07

## 2025-07-02 RX ADMIN — TIZANIDINE 4 MG: 4 TABLET ORAL at 08:07

## 2025-07-02 RX ADMIN — GABAPENTIN 600 MG: 300 CAPSULE ORAL at 02:07

## 2025-07-02 RX ADMIN — HYDROMORPHONE HYDROCHLORIDE 2 MG: 2 INJECTION INTRAMUSCULAR; INTRAVENOUS; SUBCUTANEOUS at 03:07

## 2025-07-02 RX ADMIN — FLUOXETINE HYDROCHLORIDE 80 MG: 20 CAPSULE ORAL at 08:07

## 2025-07-02 RX ADMIN — DOCUSATE SODIUM AND SENNOSIDES 1 TABLET: 8.6; 5 TABLET ORAL at 08:07

## 2025-07-02 RX ADMIN — HYDROMORPHONE HYDROCHLORIDE 2 MG: 2 INJECTION INTRAMUSCULAR; INTRAVENOUS; SUBCUTANEOUS at 05:07

## 2025-07-02 RX ADMIN — DIPHENHYDRAMINE HYDROCHLORIDE 50 MG: 50 INJECTION INTRAMUSCULAR; INTRAVENOUS at 05:07

## 2025-07-02 RX ADMIN — MUPIROCIN: 20 OINTMENT TOPICAL at 08:07

## 2025-07-02 RX ADMIN — HYDROMORPHONE HYDROCHLORIDE 2 MG: 2 INJECTION INTRAMUSCULAR; INTRAVENOUS; SUBCUTANEOUS at 02:07

## 2025-07-02 RX ADMIN — MORPHINE SULFATE 30 MG: 30 TABLET, FILM COATED, EXTENDED RELEASE ORAL at 08:07

## 2025-07-02 RX ADMIN — AMLODIPINE BESYLATE 10 MG: 5 TABLET ORAL at 08:07

## 2025-07-02 RX ADMIN — DIPHENHYDRAMINE HYDROCHLORIDE 50 MG: 50 INJECTION INTRAMUSCULAR; INTRAVENOUS at 09:07

## 2025-07-02 RX ADMIN — TIZANIDINE 4 MG: 4 TABLET ORAL at 05:07

## 2025-07-02 RX ADMIN — OXYCODONE HYDROCHLORIDE 5 MG: 5 TABLET ORAL at 08:07

## 2025-07-02 RX ADMIN — OXYCODONE HYDROCHLORIDE 5 MG: 5 TABLET ORAL at 01:07

## 2025-07-02 RX ADMIN — GABAPENTIN 600 MG: 300 CAPSULE ORAL at 09:07

## 2025-07-02 RX ADMIN — LACTULOSE 10 G: 20 SOLUTION ORAL at 08:07

## 2025-07-02 RX ADMIN — MUPIROCIN: 20 OINTMENT TOPICAL at 09:07

## 2025-07-02 RX ADMIN — WARFARIN SODIUM 3 MG: 2 TABLET ORAL at 05:07

## 2025-07-02 RX ADMIN — PRAZOSIN HYDROCHLORIDE 1 MG: 1 CAPSULE ORAL at 09:07

## 2025-07-02 RX ADMIN — DIPHENHYDRAMINE HYDROCHLORIDE 50 MG: 50 INJECTION INTRAMUSCULAR; INTRAVENOUS at 02:07
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
## 2025-07-02 NOTE — SUBJECTIVE & OBJECTIVE
Interval History: Still with pain in hips and knee. Discussed importance of ortho follow up. Cont PT/OT     Review of Systems   Constitutional:  Negative for chills and fever.   Respiratory:  Negative for cough and shortness of breath.    Cardiovascular:  Negative for chest pain and palpitations.   Musculoskeletal:  Positive for arthralgias, gait problem, joint swelling and myalgias.     Objective:     Vital Signs (Most Recent):  Temp: 98.3 °F (36.8 °C) (07/02/25 0735)  Pulse: 75 (07/02/25 0735)  Resp: 17 (07/02/25 0909)  BP: (!) 125/58 (07/02/25 0735)  SpO2: (!) 94 % (07/02/25 0735) Vital Signs (24h Range):  Temp:  [98.2 °F (36.8 °C)-98.5 °F (36.9 °C)] 98.3 °F (36.8 °C)  Pulse:  [71-91] 75  Resp:  [17-20] 17  SpO2:  [94 %-97 %] 94 %  BP: (125-152)/(58-97) 125/58     Weight: 99.8 kg (220 lb)  Body mass index is 40.24 kg/m².    Intake/Output Summary (Last 24 hours) at 7/2/2025 1137  Last data filed at 7/2/2025 0837  Gross per 24 hour   Intake 2140 ml   Output 2500 ml   Net -360 ml         Physical Exam  Constitutional:       General: She is not in acute distress.  Pulmonary:      Effort: No respiratory distress.      Breath sounds: No wheezing.   Abdominal:      General: There is no distension.      Tenderness: There is no abdominal tenderness.   Musculoskeletal:      Right lower leg: No edema.      Left lower leg: No edema.      Comments: Decreased ROM right knee.  Large scar right lateral calf.    Neurological:      General: No focal deficit present.      Mental Status: She is oriented to person, place, and time.               Significant Labs: All pertinent labs within the past 24 hours have been reviewed.    Significant Imaging: I have reviewed all pertinent imaging results/findings within the past 24 hours.
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
## 2025-07-02 NOTE — ASSESSMENT & PLAN NOTE
History of pulmonary embolism      Daily PT/INR  7/1 - Restart coumadin at 3mg                0 (no pain/absence of nonverbal indicators of pain)
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
## 2025-07-03 LAB
ALBUMIN SERPL BCP-MCNC: 3.2 G/DL (ref 3.5–5.2)
ALP SERPL-CCNC: 89 UNIT/L (ref 40–150)
ALT SERPL W/O P-5'-P-CCNC: 16 UNIT/L (ref 10–44)
ANION GAP (OHS): 7 MMOL/L (ref 8–16)
AST SERPL-CCNC: 21 UNIT/L (ref 11–45)
BILIRUB SERPL-MCNC: 0.3 MG/DL (ref 0.1–1)
BUN SERPL-MCNC: 9 MG/DL (ref 6–20)
CALCIUM SERPL-MCNC: 8.8 MG/DL (ref 8.7–10.5)
CHLORIDE SERPL-SCNC: 104 MMOL/L (ref 95–110)
CO2 SERPL-SCNC: 30 MMOL/L (ref 23–29)
CREAT SERPL-MCNC: 1 MG/DL (ref 0.5–1.4)
ERYTHROCYTE [DISTWIDTH] IN BLOOD BY AUTOMATED COUNT: 26.6 % (ref 11.5–14.5)
GFR SERPLBLD CREATININE-BSD FMLA CKD-EPI: >60 ML/MIN/1.73/M2
GLUCOSE SERPL-MCNC: 115 MG/DL (ref 70–110)
HCT VFR BLD AUTO: 26.9 % (ref 37–48.5)
HGB BLD-MCNC: 8.8 GM/DL (ref 12–16)
INR PPP: 2.1 (ref 0.8–1.2)
MCH RBC QN AUTO: 23.8 PG (ref 27–31)
MCHC RBC AUTO-ENTMCNC: 32.7 G/DL (ref 32–36)
MCV RBC AUTO: 73 FL (ref 82–98)
PLATELET # BLD AUTO: 266 K/UL (ref 150–450)
PMV BLD AUTO: 9.4 FL (ref 9.2–12.9)
POTASSIUM SERPL-SCNC: 4 MMOL/L (ref 3.5–5.1)
PROT SERPL-MCNC: 6.8 GM/DL (ref 6–8.4)
PROTHROMBIN TIME: 22.1 SECONDS (ref 9–12.5)
RBC # BLD AUTO: 3.69 M/UL (ref 4–5.4)
SODIUM SERPL-SCNC: 141 MMOL/L (ref 136–145)
WBC # BLD AUTO: 6.97 K/UL (ref 3.9–12.7)

## 2025-07-03 PROCEDURE — 85027 COMPLETE CBC AUTOMATED: CPT | Performed by: STUDENT IN AN ORGANIZED HEALTH CARE EDUCATION/TRAINING PROGRAM

## 2025-07-03 PROCEDURE — 25000003 PHARM REV CODE 250: Performed by: STUDENT IN AN ORGANIZED HEALTH CARE EDUCATION/TRAINING PROGRAM

## 2025-07-03 PROCEDURE — 21400001 HC TELEMETRY ROOM

## 2025-07-03 PROCEDURE — 25000003 PHARM REV CODE 250: Performed by: HOSPITALIST

## 2025-07-03 PROCEDURE — 97116 GAIT TRAINING THERAPY: CPT | Mod: CQ

## 2025-07-03 PROCEDURE — 80053 COMPREHEN METABOLIC PANEL: CPT | Performed by: STUDENT IN AN ORGANIZED HEALTH CARE EDUCATION/TRAINING PROGRAM

## 2025-07-03 PROCEDURE — 94761 N-INVAS EAR/PLS OXIMETRY MLT: CPT

## 2025-07-03 PROCEDURE — 63600175 PHARM REV CODE 636 W HCPCS: Performed by: HOSPITALIST

## 2025-07-03 PROCEDURE — 63600175 PHARM REV CODE 636 W HCPCS: Performed by: EMERGENCY MEDICINE

## 2025-07-03 PROCEDURE — 85610 PROTHROMBIN TIME: CPT | Performed by: STUDENT IN AN ORGANIZED HEALTH CARE EDUCATION/TRAINING PROGRAM

## 2025-07-03 PROCEDURE — 25000003 PHARM REV CODE 250: Performed by: NURSE PRACTITIONER

## 2025-07-03 RX ORDER — OXYCODONE AND ACETAMINOPHEN 10; 325 MG/1; MG/1
1 TABLET ORAL EVERY 6 HOURS PRN
Qty: 60 TABLET | Refills: 0 | Status: SHIPPED | OUTPATIENT
Start: 2025-07-03 | End: 2025-07-18

## 2025-07-03 RX ADMIN — DOCUSATE SODIUM AND SENNOSIDES 1 TABLET: 8.6; 5 TABLET ORAL at 08:07

## 2025-07-03 RX ADMIN — PANTOPRAZOLE SODIUM 40 MG: 40 TABLET, DELAYED RELEASE ORAL at 08:07

## 2025-07-03 RX ADMIN — OXYCODONE HYDROCHLORIDE AND ACETAMINOPHEN 500 MG: 500 TABLET ORAL at 08:07

## 2025-07-03 RX ADMIN — DIPHENHYDRAMINE HYDROCHLORIDE 50 MG: 50 INJECTION INTRAMUSCULAR; INTRAVENOUS at 08:07

## 2025-07-03 RX ADMIN — HYDROMORPHONE HYDROCHLORIDE 2 MG: 2 INJECTION INTRAMUSCULAR; INTRAVENOUS; SUBCUTANEOUS at 08:07

## 2025-07-03 RX ADMIN — MORPHINE SULFATE 30 MG: 30 TABLET, FILM COATED, EXTENDED RELEASE ORAL at 08:07

## 2025-07-03 RX ADMIN — DIPHENHYDRAMINE HYDROCHLORIDE 50 MG: 50 INJECTION INTRAMUSCULAR; INTRAVENOUS at 03:07

## 2025-07-03 RX ADMIN — WARFARIN SODIUM 3 MG: 2 TABLET ORAL at 05:07

## 2025-07-03 RX ADMIN — ONDANSETRON 4 MG: 2 INJECTION INTRAMUSCULAR; INTRAVENOUS at 08:07

## 2025-07-03 RX ADMIN — GABAPENTIN 600 MG: 300 CAPSULE ORAL at 08:07

## 2025-07-03 RX ADMIN — LACTULOSE 10 G: 20 SOLUTION ORAL at 08:07

## 2025-07-03 RX ADMIN — HYDROMORPHONE HYDROCHLORIDE 2 MG: 2 INJECTION INTRAMUSCULAR; INTRAVENOUS; SUBCUTANEOUS at 01:07

## 2025-07-03 RX ADMIN — HYDROMORPHONE HYDROCHLORIDE 2 MG: 2 INJECTION INTRAMUSCULAR; INTRAVENOUS; SUBCUTANEOUS at 11:07

## 2025-07-03 RX ADMIN — TIZANIDINE 4 MG: 4 TABLET ORAL at 08:07

## 2025-07-03 RX ADMIN — TIZANIDINE 4 MG: 4 TABLET ORAL at 11:07

## 2025-07-03 RX ADMIN — GABAPENTIN 600 MG: 300 CAPSULE ORAL at 01:07

## 2025-07-03 RX ADMIN — FOLIC ACID 1 MG: 1 TABLET ORAL at 08:07

## 2025-07-03 RX ADMIN — TIZANIDINE 4 MG: 4 TABLET ORAL at 03:07

## 2025-07-03 RX ADMIN — MUPIROCIN: 20 OINTMENT TOPICAL at 08:07

## 2025-07-03 RX ADMIN — AMLODIPINE BESYLATE 10 MG: 5 TABLET ORAL at 08:07

## 2025-07-03 RX ADMIN — PRAZOSIN HYDROCHLORIDE 1 MG: 1 CAPSULE ORAL at 08:07

## 2025-07-03 RX ADMIN — DIPHENHYDRAMINE HYDROCHLORIDE 50 MG: 50 INJECTION INTRAMUSCULAR; INTRAVENOUS at 01:07

## 2025-07-03 RX ADMIN — HYDROMORPHONE HYDROCHLORIDE 2 MG: 2 INJECTION INTRAMUSCULAR; INTRAVENOUS; SUBCUTANEOUS at 03:07

## 2025-07-03 RX ADMIN — HYDROMORPHONE HYDROCHLORIDE 2 MG: 2 INJECTION INTRAMUSCULAR; INTRAVENOUS; SUBCUTANEOUS at 05:07

## 2025-07-03 RX ADMIN — FLUOXETINE HYDROCHLORIDE 80 MG: 20 CAPSULE ORAL at 08:07

## 2025-07-03 NOTE — PLAN OF CARE
"DARRION faxed order for rollator to [facility].     Your fax has been successfully sent to [phone] at [phone].  ------------------------------------------------------------  From: [id number]  ------------------------------------------------------------  7/3/2025 10:35:46 AM Transmission Record          Sent to [phone] with remote ID "[email or web address]                 "          Result: (0/339;0/0) Success          Page record: 1 - 10          Elapsed time: 05:01 on channel 29      [employer] approved rollator but required a copay of $50 but patient cannot pay.         DARRION faxed order to [facility] [phone].    Your fax has been successfully sent to [phone] at [phone].  ------------------------------------------------------------  From: [id number]  ------------------------------------------------------------  7/3/2025 3:50:01 PM Transmission Record          Sent to [phone] with remote ID "[id number]"          Result: (0/339;0/0) Success          Page record: 1 - 10          Elapsed time: 03:51 on channel 53     "

## 2025-07-03 NOTE — PLAN OF CARE
Problem: Adult Inpatient Plan of Care  Goal: Plan of Care Review  Outcome: Progressing  Goal: Patient-Specific Goal (Individualized)  Outcome: Progressing  Goal: Absence of Hospital-Acquired Illness or Injury  Outcome: Progressing  Goal: Optimal Comfort and Wellbeing  Outcome: Progressing  Goal: Readiness for Transition of Care  Outcome: Progressing     Problem: Bariatric Environmental Safety  Goal: Safety Maintained with Care  Outcome: Progressing     Problem: Fall Injury Risk  Goal: Absence of Fall and Fall-Related Injury  Outcome: Progressing     Problem: Sickle Cell Disease  Goal: Optimal Cerebral Tissue Perfusion  Outcome: Progressing  Goal: Optimal Coping with Sickle Cell Disease  Outcome: Progressing  Goal: Effective Tissue Perfusion  Outcome: Progressing  Goal: Absence of Infection Signs and Symptoms  Outcome: Progressing  Goal: Optimal Pain Control and Function  Outcome: Progressing  Goal: Optimal Oxygenation  Outcome: Progressing
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
## 2025-07-03 NOTE — SUBJECTIVE & OBJECTIVE
Interval History: Still with pain in hips and knee. Discussed importance of ortho follow up. Cont PT/OT     Review of Systems   Constitutional:  Negative for chills and fever.   Respiratory:  Negative for cough and shortness of breath.    Cardiovascular:  Negative for chest pain and palpitations.   Musculoskeletal:  Positive for arthralgias, gait problem, joint swelling and myalgias.     Objective:     Vital Signs (Most Recent):  Temp: 98.6 °F (37 °C) (07/03/25 1142)  Pulse: 74 (07/03/25 1142)  Resp: 18 (07/03/25 1142)  BP: (!) 103/56 (07/03/25 1142)  SpO2: (!) 94 % (07/03/25 1142) Vital Signs (24h Range):  Temp:  [98 °F (36.7 °C)-98.6 °F (37 °C)] 98.6 °F (37 °C)  Pulse:  [70-80] 74  Resp:  [15-20] 18  SpO2:  [94 %-97 %] 94 %  BP: (103-134)/(55-80) 103/56     Weight: 99.8 kg (220 lb)  Body mass index is 40.24 kg/m².    Intake/Output Summary (Last 24 hours) at 7/3/2025 1211  Last data filed at 7/3/2025 0815  Gross per 24 hour   Intake 1900 ml   Output 2700 ml   Net -800 ml         Physical Exam  Constitutional:       General: She is not in acute distress.  Pulmonary:      Effort: No respiratory distress.      Breath sounds: No wheezing.   Abdominal:      General: There is no distension.      Tenderness: There is no abdominal tenderness.   Musculoskeletal:      Right lower leg: No edema.      Left lower leg: No edema.      Comments: Decreased ROM right knee.  Large scar right lateral calf.    Neurological:      General: No focal deficit present.      Mental Status: She is oriented to person, place, and time.               Significant Labs: All pertinent labs within the past 24 hours have been reviewed.    Significant Imaging: I have reviewed all pertinent imaging results/findings within the past 24 hours.
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
## 2025-07-03 NOTE — PT/OT/SLP PROGRESS
"Physical Therapy Treatment    Patient Name:  Nazanin Malone   MRN:  [id number]    Recommendations:     Discharge Recommendations: Low Intensity Therapy  Discharge Equipment Recommendations: rollator See PT note dated 6/30 for DME recommendations and justifications    Barriers to discharge: None    Assessment:     Nazanin Malone is a 45 y.o. female admitted with a medical diagnosis of Vaso-occlusive pain due to sickle cell disease.  She presents with the following impairments/functional limitations: weakness, impaired functional mobility, gait instability, impaired balance, decreased lower extremity function, decreased ROM ;pt with good mobility today using rollator and AFO to RLE, no issues w/ toe dragging, though pt needs a more supportive shoe to use w/ AFO (had to ace wrap brace to RLE and use sock to trial AFO brace today). .    Rehab Prognosis: Good; patient would benefit from acute skilled PT services to address these deficits and reach maximum level of function.    Recent Surgery: * No surgery found *      Plan:     During this hospitalization, patient to be seen 2 x/week to address the identified rehab impairments via gait training, therapeutic activities, therapeutic exercises, neuromuscular re-education and progress toward the following goals:    Plan of Care Expires:  07/14/25    Subjective     Chief Complaint: none stated  Patient/Family Comments/goals: pt agreeable to session, "I was just in a mood today". Pt pleasant and eager to walk at this time.  Pain/Comfort:  Pain Rating 1: 0/10  Pain Rating Post-Intervention 1: 0/10      Objective:     Communicated with nurse prior to session.  Patient found HOB elevated with peripheral IV upon PT entry to room.     General Precautions: Standard, fall  Orthopedic Precautions: N/A  Braces: AFO (for RLE)  Respiratory Status: Room air     Functional Mobility:  Bed Mobility:     Supine to Sit: independence  Sit to Supine: independence  Transfers:     Sit to " Stand:  modified independence with 4 wheeled walker  Gait: amb'd ~400' w/ rollator and SBA/Sup, AFO to RLE (delivered to room today).   Stairs: asc/desc 4 steps w/ 2 rails and SBA      AM-PAC 6 CLICK MOBILITY  Turning over in bed (including adjusting bedclothes, sheets and blankets)?: 4  Sitting down on and standing up from a chair with arms (e.g., wheelchair, bedside commode, etc.): 4  Moving from lying on back to sitting on the side of the bed?: 4  Moving to and from a bed to a chair (including a wheelchair)?: 4  Need to walk in hospital room?: 3  Climbing 3-5 steps with a railing?: 3  Basic Mobility Total Score: 22       Treatment & Education:  Pt educated on safety, using tennis shoe for amb. W/ AFO.     Patient left HOB elevated with all lines intact, call button in reach, and nurse notified..    GOALS:   Multidisciplinary Problems       Physical Therapy Goals          Problem: Physical Therapy    Goal Priority Disciplines Outcome Interventions   Physical Therapy Goal     PT, PT/OT Progressing    Description: P.T goals to be met in 2 weeks as follows:  1. Independent w bed mobility  2. Independent w t/f  3. Gait 500' MOD I with LRAD  4. I with HEP  5. Pt will ascend/descend 4 steps with Mod I                         DME Justifications:   Nazanin's mobility limitation cannot be sufficiently resolved by the use of a cane. Her functional mobility deficit can be sufficiently resolved with the use of a Rollator. Patient's mobility limitation significantly impairs their ability to participate in one of more activities of daily living.  The use of a Rollator will significantly improve the patient's ability to participate in MRADLS and the patient will use it on regular basis in the home.      Time Tracking:     PT Received On: 07/03/25  PT Start Time: 1636     PT Stop Time: 1649  PT Total Time (min): 13 min     Billable Minutes: Gait Training 13    Treatment Type: Treatment  PT/PTA: PTA     Number of PTA visits  since last PT visit: 2 07/03/2025
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
## 2025-07-03 NOTE — PLAN OF CARE
DARRION called [facility] [phone] and ordered AFO brace for patient. Brace will be delivered to bedside.

## 2025-07-04 VITALS
HEIGHT: 62 IN | HEART RATE: 91 BPM | SYSTOLIC BLOOD PRESSURE: 128 MMHG | BODY MASS INDEX: 40.48 KG/M2 | DIASTOLIC BLOOD PRESSURE: 73 MMHG | RESPIRATION RATE: 18 BRPM | OXYGEN SATURATION: 93 % | WEIGHT: 220 LBS | TEMPERATURE: 99 F

## 2025-07-04 LAB
ALBUMIN SERPL BCP-MCNC: 3 G/DL (ref 3.5–5.2)
ALP SERPL-CCNC: 92 UNIT/L (ref 40–150)
ALT SERPL W/O P-5'-P-CCNC: 16 UNIT/L (ref 10–44)
ANION GAP (OHS): 11 MMOL/L (ref 8–16)
AST SERPL-CCNC: 20 UNIT/L (ref 11–45)
BILIRUB SERPL-MCNC: 0.4 MG/DL (ref 0.1–1)
BUN SERPL-MCNC: 9 MG/DL (ref 6–20)
CALCIUM SERPL-MCNC: 8.9 MG/DL (ref 8.7–10.5)
CHLORIDE SERPL-SCNC: 103 MMOL/L (ref 95–110)
CO2 SERPL-SCNC: 27 MMOL/L (ref 23–29)
CREAT SERPL-MCNC: 1 MG/DL (ref 0.5–1.4)
ERYTHROCYTE [DISTWIDTH] IN BLOOD BY AUTOMATED COUNT: 26.3 % (ref 11.5–14.5)
GFR SERPLBLD CREATININE-BSD FMLA CKD-EPI: >60 ML/MIN/1.73/M2
GLUCOSE SERPL-MCNC: 80 MG/DL (ref 70–110)
HCT VFR BLD AUTO: 27.4 % (ref 37–48.5)
HGB BLD-MCNC: 9 GM/DL (ref 12–16)
INR PPP: 2.2 (ref 0.8–1.2)
MCH RBC QN AUTO: 23.6 PG (ref 27–31)
MCHC RBC AUTO-ENTMCNC: 32.8 G/DL (ref 32–36)
MCV RBC AUTO: 72 FL (ref 82–98)
PLATELET # BLD AUTO: 261 K/UL (ref 150–450)
PMV BLD AUTO: 8.9 FL (ref 9.2–12.9)
POTASSIUM SERPL-SCNC: 3.9 MMOL/L (ref 3.5–5.1)
PROT SERPL-MCNC: 7 GM/DL (ref 6–8.4)
PROTHROMBIN TIME: 23.6 SECONDS (ref 9–12.5)
RBC # BLD AUTO: 3.81 M/UL (ref 4–5.4)
SODIUM SERPL-SCNC: 141 MMOL/L (ref 136–145)
WBC # BLD AUTO: 8.39 K/UL (ref 3.9–12.7)

## 2025-07-04 PROCEDURE — 85610 PROTHROMBIN TIME: CPT | Performed by: STUDENT IN AN ORGANIZED HEALTH CARE EDUCATION/TRAINING PROGRAM

## 2025-07-04 PROCEDURE — 25000003 PHARM REV CODE 250: Performed by: STUDENT IN AN ORGANIZED HEALTH CARE EDUCATION/TRAINING PROGRAM

## 2025-07-04 PROCEDURE — 25000003 PHARM REV CODE 250: Performed by: HOSPITALIST

## 2025-07-04 PROCEDURE — 85027 COMPLETE CBC AUTOMATED: CPT | Performed by: STUDENT IN AN ORGANIZED HEALTH CARE EDUCATION/TRAINING PROGRAM

## 2025-07-04 PROCEDURE — 82040 ASSAY OF SERUM ALBUMIN: CPT | Performed by: STUDENT IN AN ORGANIZED HEALTH CARE EDUCATION/TRAINING PROGRAM

## 2025-07-04 PROCEDURE — 63600175 PHARM REV CODE 636 W HCPCS: Performed by: HOSPITALIST

## 2025-07-04 PROCEDURE — 25000003 PHARM REV CODE 250: Performed by: NURSE PRACTITIONER

## 2025-07-04 RX ADMIN — PANTOPRAZOLE SODIUM 40 MG: 40 TABLET, DELAYED RELEASE ORAL at 08:07

## 2025-07-04 RX ADMIN — HYDROMORPHONE HYDROCHLORIDE 2 MG: 2 INJECTION INTRAMUSCULAR; INTRAVENOUS; SUBCUTANEOUS at 02:07

## 2025-07-04 RX ADMIN — TIZANIDINE 4 MG: 4 TABLET ORAL at 11:07

## 2025-07-04 RX ADMIN — DIPHENHYDRAMINE HYDROCHLORIDE 50 MG: 50 INJECTION INTRAMUSCULAR; INTRAVENOUS at 02:07

## 2025-07-04 RX ADMIN — DOCUSATE SODIUM AND SENNOSIDES 1 TABLET: 8.6; 5 TABLET ORAL at 08:07

## 2025-07-04 RX ADMIN — AMLODIPINE BESYLATE 10 MG: 5 TABLET ORAL at 08:07

## 2025-07-04 RX ADMIN — HYDROMORPHONE HYDROCHLORIDE 2 MG: 2 INJECTION INTRAMUSCULAR; INTRAVENOUS; SUBCUTANEOUS at 05:07

## 2025-07-04 RX ADMIN — DIPHENHYDRAMINE HYDROCHLORIDE 50 MG: 50 INJECTION INTRAMUSCULAR; INTRAVENOUS at 08:07

## 2025-07-04 RX ADMIN — OXYCODONE HYDROCHLORIDE AND ACETAMINOPHEN 500 MG: 500 TABLET ORAL at 08:07

## 2025-07-04 RX ADMIN — GABAPENTIN 600 MG: 300 CAPSULE ORAL at 08:07

## 2025-07-04 RX ADMIN — HYDROMORPHONE HYDROCHLORIDE 2 MG: 2 INJECTION INTRAMUSCULAR; INTRAVENOUS; SUBCUTANEOUS at 11:07

## 2025-07-04 RX ADMIN — MUPIROCIN: 20 OINTMENT TOPICAL at 08:07

## 2025-07-04 RX ADMIN — MORPHINE SULFATE 30 MG: 30 TABLET, FILM COATED, EXTENDED RELEASE ORAL at 08:07

## 2025-07-04 RX ADMIN — FLUOXETINE HYDROCHLORIDE 80 MG: 20 CAPSULE ORAL at 08:07

## 2025-07-04 RX ADMIN — FOLIC ACID 1 MG: 1 TABLET ORAL at 08:07

## 2025-07-04 RX ADMIN — HYDROMORPHONE HYDROCHLORIDE 2 MG: 2 INJECTION INTRAMUSCULAR; INTRAVENOUS; SUBCUTANEOUS at 08:07

## 2025-07-04 NOTE — DISCHARGE SUMMARY
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 She reports that it began yesterday and she believes it is a sickle cell pain crisis. She says at baseline she does not have pain. Her home regimen did not relieve her pain. Nazanin denies chest pain and shortness of breath. She mentioned that she has a virtual appointment with Dr Soni, her hematologist, on Tuesday. Her pain was not controlled in the ED after IV pain med administration. Xray of right tib/fib negative for any acute findings. Patient admitted to hospital medicine for further management.         Hospital Course:   Patient admitted and treated for sickle cell pain event.  She was treated with gentle IVF, Dilaudid/Benadryl and home medications.  No ketorolac as she takes warfarin.  INR was elevated at 3.7 on her home dose so it was held and INR monitored. INR trending down and warfarin restarted. Close follow up with INR clinic.     As her pain is always centered in the RLE and she has tricompartmental arthritis of the right knee and reactive bursitis, imaging was ordered to evaluate.  She has not followed up with orthopedic surgery but was told she needed a knee replacement more than a year ago.  " "Noted on MRI to have bursitis of both hips (worse on the left) and severe osteoarthritis of the right knee.  Patient was referred for follow up in orthopedic surgery prior to discharge. Seen by PT/OT also discharging with AFO brace and rollator. Stable for discharge.      Goals of Care Treatment Preferences:  Code Status: Full Code      Final Active Diagnoses:    Diagnosis Date Noted POA    PRINCIPAL PROBLEM:  Vaso-occlusive pain due to sickle cell disease [D57.00] 04/16/2017 Yes    Primary osteoarthritis of right knee [M17.11] 06/29/2025 Yes    Cervical spinal stenosis [M48.02] 06/29/2025 Yes    Warfarin anticoagulation [Z79.01] 08/16/2024 Not Applicable    History of pulmonary embolism [Z86.711] 06/08/2020 Yes     Chronic    Intermittent asthma [J45.20] 04/26/2019 Yes     Chronic    Drug dependence [F19.20] 04/16/2017 Yes     Chronic      Problems Resolved During this Admission:       Discharged Condition: good    Disposition: Home health     Follow Up:   Contact information for follow-up providers       Giovanny Taylor MD Follow up on 7/14/2025.    Specialty: Orthopedic Surgery  Why: 1:00pm  Contact information:  [facility] [address]  [phone]                       Contact information for after-discharge care       Home Medical Care       [facility] .    Service: Home Health Services  Contact information:  [address] [address]  [phone]                                 Patient Instructions:      Orthotic measurement   Standing Status: Future Standing Exp. Date: 07/03/26   Order Comments: Right foot AFO     Order Specific Question Answer Comments   Type of Orthotic to be filled? Other (please specify)    Laterality Right      WALKER FOR HOME USE     Order Specific Question Answer Comments   Type of Walker: Rollator with brakes and/or seat    With wheels? Yes    Height: 5' 2" (1.575 m)    Weight: 99.8 kg (220 lb)    Length " "of need (1-99 months): 3    Does patient have medical equipment at home? none    Please check all that apply: Patient's condition impairs ambulation.      ANKLE FOOT ORTHOSIS FOR HOME USE     Order Specific Question Answer Comments   Type of AFO to be filled? Solid ankle AFO    Height: 5' 2" (1.575 m)    Weight: 99.8 kg (220 lb)    Does patient have medical equipment at home? none      Ambulatory referral/consult to Orthopedics   Standing Status: Future   Referral Priority: Routine Referral Type: Consultation   Requested Specialty: Orthopedic Surgery   Number of Visits Requested: 1     OT splint fabrication   Standing Status: Future Standing Exp. Date: 07/03/26     Medications:  Reconciled Home Medications:      Medication List        START taking these medications      oxyCODONE-acetaminophen  mg per tablet  Commonly known as: PERCOCET  Take 1 tablet by mouth every 6 (six) hours as needed for Pain.            CONTINUE taking these medications      acetaminophen 325 MG tablet  Commonly known as: TYLENOL  Take 2 tablets (650 mg total) by mouth every 8 (eight) hours as needed for Pain.     albuterol 90 mcg/actuation inhaler  Commonly known as: VENTOLIN HFA  Inhale 2 puffs into the lungs every 6 (six) hours as needed for Wheezing or Shortness of Breath. Rescue     amLODIPine 10 MG tablet  Commonly known as: NORVASC  Take 1 tablet (10 mg total) by mouth once daily.     dicyclomine 10 MG capsule  Commonly known as: BENTYL  Take 10 mg by mouth daily as needed.     FLUoxetine 40 MG capsule  Take 2 capsules (80 mg total) by mouth once daily.     fluticasone propionate 50 mcg/actuation nasal spray  Commonly known as: FLONASE  1 spray (50 mcg total) by Each Nostril route daily as needed for Allergies.     folic acid 1 MG tablet  Commonly known as: FOLVITE  Take 1 tablet (1 mg total) by mouth once daily.     gabapentin 300 MG capsule  Commonly known as: NEURONTIN  Take 2-3 capsules (600-900 mg total) by mouth 3 (three) " times daily.     naloxone 4 mg/actuation Spry  Commonly known as: NARCAN  4mg by nasal route as needed for opioid overdose; may repeat every 2-3 minutes in alternating nostrils until medical help arrives. Call 911     pantoprazole 40 MG tablet  Commonly known as: PROTONIX  Take 1 tablet (40 mg total) by mouth once daily.     prazosin 1 MG Cap  Commonly known as: MINIPRESS  Take 1 capsule (1 mg total) by mouth every evening.     senna-docusate 8.6-50 mg per tablet  Commonly known as: PERICOLACE  Take 1 tablet by mouth daily as needed for Constipation.     tiZANidine 4 MG tablet  Commonly known as: ZANAFLEX  Take 1 tablet (4 mg total) by mouth every 8 (eight) hours as needed.     VITAMIN C ORAL  Take 1 capsule by mouth Daily.     warfarin 4 MG tablet  Commonly known as: COUMADIN  Take 1 tablet (4 mg total) by mouth Daily. As directed by the Coumadin Clinic            ASK your doctor about these medications      morphine 30 MG 12 hr tablet  Commonly known as: MS CONTIN  Take 1 tablet (30 mg total) by mouth every 12 (twelve) hours.  Ask about: Should I take this medication?              Indwelling Lines/Drains at time of discharge:   Lines/Drains/Airways       Central Venous Catheter Line  Duration             Port A Cath Single Lumen 02/10/25 Atrial Right 144 days                        Time spent on the discharge of patient: 35 minutes         Sherine Foreman MD  Department of Hospital Medicine  [facility] ([address])

## 2025-07-04 NOTE — PLAN OF CARE
Case Management Final Discharge Note    Discharge Disposition: home with [facility] New DME ordered / company name: Rolator to be delivered to home and [employer] delivered     Relevant SDOH / Transition of Care Barriers:  none    Person available to provide assistance at home when needed and their contact information: Timoteo Malone (Daughter)  [phone] (Mobile)     Scheduled followup appointment: Hematology, Ortho    Referrals placed: none    Transportation: patient daughter will transport patient home      Patient and family educated on discharge services and updated on DC plan. Bedside RN notified, patient clear to discharge from Case Management Perspective.      [facility] ([address])  Discharge Final Note    Primary Care Provider: Kezia, Primary Doctor    Expected Discharge Date: 7/4/2025    Final Discharge Note (most recent)       Final Note - 07/04/25 0904          Final Note    Assessment Type Final Discharge Note     Anticipated Discharge Disposition Home-Health Care [facility]    What phone number can be called within the next 1-3 days to see how you are doing after discharge? [phone]     Hospital Resources/Appts/Education Provided Provided patient/caregiver with written discharge plan information;Appointments scheduled and added to AVS;Post-Acute resouces added to AVS        Post-Acute Status    Post-Acute Authorization Home Health     Home Health Status Set-up Complete/Auth obtained     Discharge Delays None known at this time                     Important Message from Medicare              Follow-up providers       Giovanny Taylor MD   Specialty: Orthopedic Surgery    [address] [place] [address]   Phone: [phone]       Next Steps: Follow up on 7/14/2025    Instructions: 1:00pm              After-discharge care                Home Medical Care       *[facility]   Service: Home Health Services    [facility]  [place], [address] [address]   Phone: [phone]

## 2025-07-04 NOTE — PLAN OF CARE
Problem: Adult Inpatient Plan of Care  Goal: Plan of Care Review  7/4/2025 1614 by Jessica Haq RN  Outcome: Met  7/4/2025 0741 by Jessica Haq RN  Outcome: Progressing  Goal: Patient-Specific Goal (Individualized)  7/4/2025 1614 by Jessica Haq RN  Outcome: Met  7/4/2025 0741 by Jessica Haq RN  Outcome: Progressing  Goal: Absence of Hospital-Acquired Illness or Injury  7/4/2025 1614 by Jessica Haq RN  Outcome: Met  7/4/2025 0741 by Jessica Haq RN  Outcome: Progressing  Goal: Optimal Comfort and Wellbeing  7/4/2025 1614 by Jessica Haq RN  Outcome: Met  7/4/2025 0741 by Jessica Haq RN  Outcome: Progressing  Goal: Readiness for Transition of Care  7/4/2025 1614 by Jessica Haq RN  Outcome: Met  7/4/2025 0741 by Jessica Haq RN  Outcome: Progressing     Problem: Bariatric Environmental Safety  Goal: Safety Maintained with Care  7/4/2025 1614 by Jessica Haq RN  Outcome: Met  7/4/2025 0741 by Jessica Haq RN  Outcome: Progressing     Problem: Infection  Goal: Absence of Infection Signs and Symptoms  7/4/2025 1614 by Jessica Haq RN  Outcome: Met  7/4/2025 0741 by Jessica Haq RN  Outcome: Progressing

## 2025-07-04 NOTE — PLAN OF CARE
[facility] ([address])      HOME HEALTH ORDERS  FACE TO FACE ENCOUNTER    Patient Name: Nazanin Malone  YOB: 1980    PCP: Kezia, Primary Doctor   PCP Address: None  PCP Phone Number: None  PCP Fax: None    Encounter Date: 6/28/25    Admit to Home Health    Diagnoses:  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]  Yes    Primary osteoarthritis of right knee [M17.11]  Yes    Cervical spinal stenosis [M48.02]  Yes    Warfarin anticoagulation [Z79.01]  Not Applicable    History of pulmonary embolism [Z86.711]  Yes     Chronic    Intermittent asthma [J45.20]  Yes     Chronic    Drug dependence [F19.20]  Yes     Chronic      Resolved Hospital Problems   No resolved problems to display.       Follow Up Appointments:  Future Appointments   Date Time Provider Department Center   7/17/2025  8:00 AM Vijaya Soni MD [facility] [place]   7/17/2025 10:40 AM Akanksha Snyder MD [facility]       Allergies:  Review of patient's allergies indicates:   Allergen Reactions    Cat dander Anaphylaxis, Itching and Shortness Of Breath    Nsaids (non-steroidal anti-inflammatory drug) Anaphylaxis, Itching and Shortness Of Breath    Latex Rash       Medications: Review discharge medications with patient and family and provide education.    Current Medications[1]     Medication List        START taking these medications      oxyCODONE-acetaminophen  mg per tablet  Commonly known as: PERCOCET  Take 1 tablet by mouth every 6 (six) hours as needed for Pain.            CONTINUE taking these medications      acetaminophen 325 MG tablet  Commonly known as: TYLENOL  Take 2 tablets (650 mg total) by mouth every 8 (eight) hours as needed for Pain.     albuterol 90 mcg/actuation inhaler  Commonly known as: VENTOLIN HFA  Inhale 2 puffs into the lungs every 6 (six) hours as needed for Wheezing or Shortness of Breath. Rescue     amLODIPine 10 MG  tablet  Commonly known as: NORVASC  Take 1 tablet (10 mg total) by mouth once daily.     dicyclomine 10 MG capsule  Commonly known as: BENTYL  Take 10 mg by mouth daily as needed.     FLUoxetine 40 MG capsule  Take 2 capsules (80 mg total) by mouth once daily.     fluticasone propionate 50 mcg/actuation nasal spray  Commonly known as: FLONASE  1 spray (50 mcg total) by Each Nostril route daily as needed for Allergies.     folic acid 1 MG tablet  Commonly known as: FOLVITE  Take 1 tablet (1 mg total) by mouth once daily.     gabapentin 300 MG capsule  Commonly known as: NEURONTIN  Take 2-3 capsules (600-900 mg total) by mouth 3 (three) times daily.     naloxone 4 mg/actuation Spry  Commonly known as: NARCAN  4mg by nasal route as needed for opioid overdose; may repeat every 2-3 minutes in alternating nostrils until medical help arrives. Call 911     pantoprazole 40 MG tablet  Commonly known as: PROTONIX  Take 1 tablet (40 mg total) by mouth once daily.     prazosin 1 MG Cap  Commonly known as: MINIPRESS  Take 1 capsule (1 mg total) by mouth every evening.     senna-docusate 8.6-50 mg per tablet  Commonly known as: PERICOLACE  Take 1 tablet by mouth daily as needed for Constipation.     tiZANidine 4 MG tablet  Commonly known as: ZANAFLEX  Take 1 tablet (4 mg total) by mouth every 8 (eight) hours as needed.     VITAMIN C ORAL  Take 1 capsule by mouth Daily.     warfarin 4 MG tablet  Commonly known as: COUMADIN  Take 1 tablet (4 mg total) by mouth Daily. As directed by the Coumadin Clinic            ASK your doctor about these medications      morphine 30 MG 12 hr tablet  Commonly known as: MS CONTIN  Take 1 tablet (30 mg total) by mouth every 12 (twelve) hours.  Ask about: Should I take this medication?                I have seen and examined this patient within the last 30 days. My clinical findings that support the need for the home health skilled services and home bound status are the  following:no   Weakness/numbness causing balance and gait disturbance due to Weakness/Debility making it taxing to leave home.         Referrals/ Consults  Physical Therapy to evaluate and treat. Evaluate for home safety and equipment needs; Establish/upgrade home exercise program. Perform / instruct on therapeutic exercises, gait training, transfer training, and Range of Motion.  Occupational Therapy to evaluate and treat. Evaluate home environment for safety and equipment needs. Perform/Instruct on transfers, ADL training, ROM, and therapeutic exercises.    Activities:   activity as tolerated    Nursing:   Agency to admit patient within 24 hours of hospital discharge unless specified on physician order or at patient request    SN to complete comprehensive assessment including routine vital signs. Instruct on disease process and s/s of complications to report to MD. Review/verify medication list sent home with the patient at time of discharge  and instruct patient/caregiver as needed. Frequency may be adjusted depending on start of care date.     Skilled nurse to perform up to 3 visits PRN for symptoms related to diagnosis    Notify MD if SBP > 160 or < 90; DBP > 90 or < 50; HR > 120 or < 50; Temp > 101; O2 < 88%    Ok to schedule additional visits based on staff availability and patient request on consecutive days within the home health episode.    When multiple disciplines ordered:    Start of Care occurs on Sunday - Wednesday schedule remaining discipline evaluations as ordered on separate consecutive days following the start of care.    Thursday SOC -schedule subsequent evaluations Friday and Monday the following week.     Friday - Saturday SOC - schedule subsequent discipline evaluations on consecutive days starting Monday of the following week.    For all post-discharge communication and subsequent orders please contact patient's primary care physician.         Home Health Aide:  Physical Therapy Three times  weekly and Occupational Therapy Three times weekly        I certify that this patient is confined to her home and needs physical therapy and occupational therapy.              [1]   Current Facility-Administered Medications   Medication Dose Route Frequency Provider Last Rate Last Admin    acetaminophen tablet 650 mg  650 mg Oral Q4H PRN Angelica Wong DNP        albuterol inhaler 2 puff  2 puff Inhalation Q6H PRN Angelica Wong DNP        amLODIPine tablet 10 mg  10 mg Oral Daily Angelica Wong DNP   10 mg at 07/04/25 0828    ascorbic acid (vitamin C) tablet 500 mg  500 mg Oral Daily Angelica Wong DNP   500 mg at 07/04/25 0828    dicyclomine capsule 10 mg  10 mg Oral Daily PRN Angelica Wong DNP        diphenhydrAMINE injection 50 mg  50 mg Intravenous Q6H PRN Oksana Phillips MD   50 mg at 07/04/25 0821    FLUoxetine capsule 80 mg  80 mg Oral Daily Angelica Wong DNP   80 mg at 07/04/25 0828    folic acid tablet 1 mg  1 mg Oral Daily Angelica Wong DNP   1 mg at 07/04/25 0828    gabapentin capsule 600 mg  600 mg Oral TID Oksana Phillips MD   600 mg at 07/04/25 0828    HYDROmorphone (PF) injection 2 mg  2 mg Intravenous Q3H PRN Oksana Phillips MD   2 mg at 07/04/25 0821    lactulose 20 gram/30 mL solution Soln 10 g  10 g Oral BID Sherine Meier MD   10 g at 07/03/25 2013    melatonin tablet 6 mg  6 mg Oral Nightly PRN Ana Philip MD   6 mg at 07/01/25 2029    morphine 12 hr tablet 30 mg  30 mg Oral Q12H Angelica Wong DNP   30 mg at 07/04/25 0828    mupirocin 2 % ointment   Nasal BID Oksana Phillips MD   Given at 07/04/25 0828    naloxone 0.4 mg/mL injection 0.02 mg  0.02 mg Intravenous PRN Angelica Wong DNP        ondansetron injection 4 mg  4 mg Intravenous Q8H PRN Ana Philip MD   4 mg at 07/03/25 0819    oxyCODONE immediate release tablet 5 mg  5 mg Oral Q4H PRN Ana Philip MD   5 mg at 07/02/25 1358    pantoprazole EC  tablet 40 mg  40 mg Oral Daily Angelica Wong DNP   40 mg at 07/04/25 0828    prazosin capsule 1 mg  1 mg Oral QHS Angelica Wong DNP   1 mg at 07/03/25 2014    senna-docusate 8.6-50 mg per tablet 1 tablet  1 tablet Oral Daily Sherine Meier MD   1 tablet at 07/04/25 0828    tiZANidine tablet 4 mg  4 mg Oral Q8H PRN Angelica Wong DNP   4 mg at 07/03/25 2014    warfarin tablet 3 mg  3 mg Oral Daily Sherine Meier MD   3 mg at 07/03/25 9849

## 2025-07-04 NOTE — NURSING
Pt discharge instruction given, Pt stated understood. Port cath removed. Medication from lock box given. Pt got a lift home.

## 2025-07-07 ENCOUNTER — ANTI-COAG VISIT (OUTPATIENT)
Dept: CARDIOLOGY | Facility: CLINIC | Age: 45
End: 2025-07-07
Payer: MEDICARE

## 2025-07-07 DIAGNOSIS — Z86.711 HISTORY OF PULMONARY EMBOLISM: Chronic | ICD-10-CM

## 2025-07-07 DIAGNOSIS — Z79.01 WARFARIN ANTICOAGULATION: Primary | ICD-10-CM

## 2025-07-07 NOTE — PROGRESS NOTES
[facility]  Hospital Discharge Follow-Up    Nazanin Malone was recently discharged from an inpatient facility and is resuming care with the [facility].       Hospital Notes per Chart Review: Patient admitted and treated for sickle cell pain event.  She was treated with gentle IVF, Dilaudid/Benadryl and home medications.  No ketorolac as she takes warfarin.  INR was elevated at 3.7 on her home dose so it was held and INR monitored. INR trending down and warfarin restarted. Close follow up with INR clinic.      As her pain is always centered in the RLE and she has tricompartmental arthritis of the right knee and reactive bursitis, imaging was ordered to evaluate.  She has not followed up with orthopedic surgery but was told she needed a knee replacement more than a year ago.  Noted on MRI to have bursitis of both hips (worse on the left) and severe osteoarthritis of the right knee.  Patient was referred for follow up in orthopedic surgery prior to discharge. Seen by PT/OT also discharging with AFO brace and rollator. Stable for discharge.        Anticoagulation Clinic Plan: Patient is to follow previous home regimen. Patient may require further dosing as it has been difficult to determine her correct home dose with her recent hospital admissions.      Follow-Up Action Needed: none      Zoey Boyd, PharmD, BCPS  Clinical Pharmacist - [facility]  Phone: [phone] or Epic Secure Messaging

## 2025-07-09 NOTE — PROGRESS NOTES
Pt called to report she is feeling very dizzy/ fatigued and concerned her INR is too high.she also reports seeing white floaters at times. Confirmed correct dose per calendar and no other symptoms reported. BP check was normal and she has been drinking and eating normally. Her KG advised pt that she can be seen in ED for evaluation or call PCP to see what they would advise. Pt does not want to go to the ED. She may recheck INR today even though it is not due until tomorrow if she is able to find a ride to lab.

## 2025-07-10 ENCOUNTER — LAB REQUISITION (OUTPATIENT)
Dept: LAB | Facility: HOSPITAL | Age: 45
End: 2025-07-10
Payer: MEDICARE

## 2025-07-10 DIAGNOSIS — Z86.711 PERSONAL HISTORY OF PULMONARY EMBOLISM: ICD-10-CM

## 2025-07-10 DIAGNOSIS — Z79.01 LONG TERM (CURRENT) USE OF ANTICOAGULANTS: ICD-10-CM

## 2025-07-10 LAB
INR PPP: 4.2 (ref 0.8–1.2)
PROTHROMBIN TIME: 41.6 SECONDS (ref 9–12.5)

## 2025-07-10 PROCEDURE — 85610 PROTHROMBIN TIME: CPT | Performed by: INTERNAL MEDICINE

## 2025-07-11 ENCOUNTER — ANTI-COAG VISIT (OUTPATIENT)
Dept: CARDIOLOGY | Facility: CLINIC | Age: 45
End: 2025-07-11
Payer: MEDICARE

## 2025-07-11 DIAGNOSIS — Z86.711 HISTORY OF PULMONARY EMBOLISM: Chronic | ICD-10-CM

## 2025-07-11 DIAGNOSIS — Z79.01 WARFARIN ANTICOAGULATION: Primary | ICD-10-CM

## 2025-07-11 NOTE — PROGRESS NOTES
[facility]    07/11/2025    Nazanin Malone (45 y.o.) is followed by the [facility].      Assessment/Plan:    Nazanin Malone presents with a supratherapeutic INR. Goal INR: 2.0-3.0    Lab Results   Component Value Date    INR 4.2 (H) 07/10/2025    INR 2.2 (H) 07/04/2025    INR 2.1 (H) 07/03/2025    PROTIME 41.6 (H) 07/10/2025    PROTIME 23.6 (H) 07/04/2025    PROTIME 22.1 (H) 07/03/2025       Assessment of patient findings per MA/LPN and chart review:   The following significant findings were found:   Patient held dose on 7/9 and 7/10   Reports decreasing her pain medication   Recent hospitalization for sickle cell pain crisis    Recommendation for patient's warfarin regimen:   Weekly warfarin dose decreased due to repeated supratherapeutic INRs.    Recommended repeat INR in 1 week      Zoey Boyd, PharmD, BCPS  Clinical Pharmacist - [facility]  Preferred Contact: Secure Messaging or In Basket Message

## 2025-07-14 ENCOUNTER — EXTERNAL HOME HEALTH (OUTPATIENT)
Dept: HOME HEALTH SERVICES | Facility: HOSPITAL | Age: 45
End: 2025-07-14
Payer: MEDICARE

## 2025-07-14 ENCOUNTER — HOSPITAL ENCOUNTER (INPATIENT)
Facility: OTHER | Age: 45
LOS: 4 days | Discharge: HOME | DRG: 812 | End: 2025-07-18
Attending: EMERGENCY MEDICINE | Admitting: EMERGENCY MEDICINE
Payer: MEDICARE

## 2025-07-14 DIAGNOSIS — K29.50 CHRONIC GASTRITIS WITHOUT BLEEDING, UNSPECIFIED GASTRITIS TYPE: Primary | Chronic | ICD-10-CM

## 2025-07-14 DIAGNOSIS — D57.00 SICKLE CELL PAIN CRISIS: ICD-10-CM

## 2025-07-14 DIAGNOSIS — D57.00 SICKLE CELL DISEASE WITH CRISIS: ICD-10-CM

## 2025-07-14 DIAGNOSIS — D57.00 HB-SS DISEASE WITH VASO-OCCLUSIVE PAIN: ICD-10-CM

## 2025-07-14 DIAGNOSIS — R07.9 CHEST PAIN: ICD-10-CM

## 2025-07-14 DIAGNOSIS — D57.00 VASO-OCCLUSIVE PAIN DUE TO SICKLE CELL DISEASE: ICD-10-CM

## 2025-07-14 DIAGNOSIS — J06.9 VIRAL URI WITH COUGH: ICD-10-CM

## 2025-07-14 PROBLEM — G89.4 CHRONIC PAIN SYNDROME: Status: ACTIVE | Noted: 2025-07-14

## 2025-07-14 PROBLEM — Z86.718 HISTORY OF VENOUS THROMBOEMBOLISM: Status: ACTIVE | Noted: 2025-07-14

## 2025-07-14 PROBLEM — E66.9 OBESITY: Status: ACTIVE | Noted: 2025-07-14

## 2025-07-14 LAB
ABSOLUTE EOSINOPHIL (OHS): 0.1 K/UL
ABSOLUTE MONOCYTE (OHS): 0.85 K/UL (ref 0.3–1)
ABSOLUTE NEUTROPHIL COUNT (OHS): 5.37 K/UL (ref 1.8–7.7)
ALBUMIN SERPL BCP-MCNC: 4 G/DL (ref 3.5–5.2)
ALP SERPL-CCNC: 105 UNIT/L (ref 40–150)
ALT SERPL W/O P-5'-P-CCNC: 14 UNIT/L (ref 10–44)
ANION GAP (OHS): 17 MMOL/L (ref 8–16)
AORTIC SIZE INDEX: 1.9 CM/M2
APTT PPP: 28.7 SECONDS (ref 21–32)
ASCENDING AORTA: 3.6 CM
AST SERPL-CCNC: 21 UNIT/L (ref 11–45)
AV INDEX (PROSTH): 0.69
AV MEAN GRADIENT: 5 MMHG
AV PEAK GRADIENT: 9 MMHG
AV VALVE AREA BY VELOCITY RATIO: 2.5 CM²
AV VALVE AREA: 2.4 CM²
AV VELOCITY RATIO: 0.73
BASOPHILS # BLD AUTO: 0.06 K/UL
BASOPHILS NFR BLD AUTO: 0.6 %
BILIRUB SERPL-MCNC: 1 MG/DL (ref 0.1–1)
BNP SERPL-MCNC: 36 PG/ML (ref 0–99)
BSA FOR ECHO PROCEDURE: 1.99 M2
BUN SERPL-MCNC: 11 MG/DL (ref 6–20)
CALCIUM SERPL-MCNC: 10.2 MG/DL (ref 8.7–10.5)
CHLORIDE SERPL-SCNC: 106 MMOL/L (ref 95–110)
CO2 SERPL-SCNC: 17 MMOL/L (ref 23–29)
CREAT SERPL-MCNC: 1.2 MG/DL (ref 0.5–1.4)
CTP QC/QA: YES
CTP QC/QA: YES
CV ECHO LV RWT: 0.38 CM
DOP CALC AO PEAK VEL: 1.5 M/S
DOP CALC AO VTI: 27.7 CM
DOP CALC LVOT AREA: 3.5 CM2
DOP CALC LVOT DIAMETER: 2.1 CM
DOP CALC LVOT PEAK VEL: 1.1 M/S
DOP CALC LVOT STROKE VOLUME: 65.8 CM3
DOP CALC RVOT PEAK VEL: 0.61 M/S
DOP CALCLVOT PEAK VEL VTI: 19 CM
E WAVE DECELERATION TIME: 124 MSEC
E/A RATIO: 0.68
E/E' RATIO: 8 M/S
ECHO LV POSTERIOR WALL: 1 CM (ref 0.6–1.1)
ERYTHROCYTE [DISTWIDTH] IN BLOOD BY AUTOMATED COUNT: 25.7 % (ref 11.5–14.5)
FRACTIONAL SHORTENING: 37.7 % (ref 28–44)
GFR SERPLBLD CREATININE-BSD FMLA CKD-EPI: 56 ML/MIN/1.73/M2
GLUCOSE SERPL-MCNC: 100 MG/DL (ref 70–110)
HCT VFR BLD AUTO: 33 % (ref 37–48.5)
HGB BLD-MCNC: 10.9 GM/DL (ref 12–16)
IMM GRANULOCYTES # BLD AUTO: 0.04 K/UL (ref 0–0.04)
IMM GRANULOCYTES NFR BLD AUTO: 0.4 % (ref 0–0.5)
INR PPP: 1.9 (ref 0.8–1.2)
INTERVENTRICULAR SEPTUM: 1.1 CM (ref 0.6–1.1)
IVC DIAMETER: 1.25 CM
LA MAJOR: 5.3 CM
LA MINOR: 5.1 CM
LACTATE SERPL-SCNC: 1.7 MMOL/L (ref 0.5–2.2)
LEFT ATRIUM AREA SYSTOLIC (APICAL 2 CHAMBER): 19.62 CM2
LEFT ATRIUM AREA SYSTOLIC (APICAL 4 CHAMBER): 18.5 CM2
LEFT ATRIUM SIZE: 3.5 CM
LEFT ATRIUM VOLUME INDEX MOD: 27 ML/M2
LEFT ATRIUM VOLUME MOD: 52 ML
LEFT INTERNAL DIMENSION IN SYSTOLE: 3.3 CM (ref 2.1–4)
LEFT VENTRICLE DIASTOLIC VOLUME INDEX: 69.63 ML/M2
LEFT VENTRICLE DIASTOLIC VOLUME: 133 ML
LEFT VENTRICLE END SYSTOLIC VOLUME APICAL 2 CHAMBER: 57.56 ML
LEFT VENTRICLE END SYSTOLIC VOLUME APICAL 4 CHAMBER: 47.39 ML
LEFT VENTRICLE MASS INDEX: 112 G/M2
LEFT VENTRICLE SYSTOLIC VOLUME INDEX: 23.6 ML/M2
LEFT VENTRICLE SYSTOLIC VOLUME: 45 ML
LEFT VENTRICULAR INTERNAL DIMENSION IN DIASTOLE: 5.3 CM (ref 3.5–6)
LEFT VENTRICULAR MASS: 213.9 G
LV LATERAL E/E' RATIO: 7.4 M/S
LV SEPTAL E/E' RATIO: 9.8 M/S
LVED V (TEICH): 132.5 ML
LVES V (TEICH): 44.74 ML
LVOT MG: 2.26 MMHG
LVOT MV: 0.7 CM/S
LYMPHOCYTES # BLD AUTO: 3.63 K/UL (ref 1–4.8)
MCH RBC QN AUTO: 22.6 PG (ref 27–31)
MCHC RBC AUTO-ENTMCNC: 33 G/DL (ref 32–36)
MCV RBC AUTO: 68 FL (ref 82–98)
MV PEAK A VEL: 0.87 M/S
MV PEAK E VEL: 0.59 M/S
MV STENOSIS PRESSURE HALF TIME: 35.95 MS
MV VALVE AREA P 1/2 METHOD: 6.12 CM2
NUCLEATED RBC (/100WBC) (OHS): 1 /100 WBC
OHS CV RV/LV RATIO: 0.72 CM
PISA TR MAX VEL: 2.6 M/S
PLATELET # BLD AUTO: 552 K/UL (ref 150–450)
PMV BLD AUTO: 9.3 FL (ref 9.2–12.9)
POC MOLECULAR INFLUENZA A AGN: NEGATIVE
POC MOLECULAR INFLUENZA B AGN: NEGATIVE
POTASSIUM SERPL-SCNC: 3.2 MMOL/L (ref 3.5–5.1)
PROT SERPL-MCNC: 8.5 GM/DL (ref 6–8.4)
PROTHROMBIN TIME: 20.5 SECONDS (ref 9–12.5)
PULM VEIN S/D RATIO: 1.64
PV PEAK D VEL: 0.28 M/S
PV PEAK GRADIENT: 3 MMHG
PV PEAK S VEL: 0.46 M/S
PV PEAK VELOCITY: 0.89 M/S
RA MAJOR: 3.7 CM
RA PRESSURE ESTIMATED: 3 MMHG
RBC # BLD AUTO: 4.83 M/UL (ref 4–5.4)
RELATIVE EOSINOPHIL (OHS): 1 %
RELATIVE LYMPHOCYTE (OHS): 36.1 % (ref 18–48)
RELATIVE MONOCYTE (OHS): 8.5 % (ref 4–15)
RELATIVE NEUTROPHIL (OHS): 53.4 % (ref 38–73)
RETICS/RBC NFR AUTO: 0.7 % (ref 0.5–2.5)
RIGHT VENTRICLE DIASTOLIC BASEL DIMENSION: 3.8 CM
RV TB RVSP: 6 MMHG
RV TISSUE DOPPLER FREE WALL SYSTOLIC VELOCITY 1 (APICAL 4 CHAMBER VIEW): 14.63 CM/S
SARS-COV-2 RDRP RESP QL NAA+PROBE: NEGATIVE
SODIUM SERPL-SCNC: 140 MMOL/L (ref 136–145)
STJ: 3.2 CM
TDI LATERAL: 0.08 M/S
TDI SEPTAL: 0.06 M/S
TDI: 0.07 M/S
TR MAX PG: 26 MMHG
TROPONIN I SERPL DL<=0.01 NG/ML-MCNC: <0.006 NG/ML
TV REST PULMONARY ARTERY PRESSURE: 30 MMHG
WBC # BLD AUTO: 10.05 K/UL (ref 3.9–12.7)
Z-SCORE OF LEFT VENTRICULAR DIMENSION IN END DIASTOLE: -0.14
Z-SCORE OF LEFT VENTRICULAR DIMENSION IN END SYSTOLE: -0.02

## 2025-07-14 PROCEDURE — 25500020 PHARM REV CODE 255: Performed by: HOSPITALIST

## 2025-07-14 PROCEDURE — 85610 PROTHROMBIN TIME: CPT | Performed by: EMERGENCY MEDICINE

## 2025-07-14 PROCEDURE — 99285 EMERGENCY DEPT VISIT HI MDM: CPT | Mod: 25

## 2025-07-14 PROCEDURE — 85025 COMPLETE CBC W/AUTO DIFF WBC: CPT | Performed by: EMERGENCY MEDICINE

## 2025-07-14 PROCEDURE — 25000003 PHARM REV CODE 250: Performed by: HOSPITALIST

## 2025-07-14 PROCEDURE — 63600175 PHARM REV CODE 636 W HCPCS: Performed by: HOSPITALIST

## 2025-07-14 PROCEDURE — 96361 HYDRATE IV INFUSION ADD-ON: CPT

## 2025-07-14 PROCEDURE — 87635 SARS-COV-2 COVID-19 AMP PRB: CPT | Performed by: EMERGENCY MEDICINE

## 2025-07-14 PROCEDURE — 87040 BLOOD CULTURE FOR BACTERIA: CPT | Performed by: EMERGENCY MEDICINE

## 2025-07-14 PROCEDURE — 80053 COMPREHEN METABOLIC PANEL: CPT | Performed by: EMERGENCY MEDICINE

## 2025-07-14 PROCEDURE — 25000003 PHARM REV CODE 250: Performed by: EMERGENCY MEDICINE

## 2025-07-14 PROCEDURE — 93010 ELECTROCARDIOGRAM REPORT: CPT | Mod: ,,, | Performed by: INTERNAL MEDICINE

## 2025-07-14 PROCEDURE — 83605 ASSAY OF LACTIC ACID: CPT | Performed by: EMERGENCY MEDICINE

## 2025-07-14 PROCEDURE — 99900035 HC TECH TIME PER 15 MIN (STAT)

## 2025-07-14 PROCEDURE — 63600175 PHARM REV CODE 636 W HCPCS: Performed by: EMERGENCY MEDICINE

## 2025-07-14 PROCEDURE — 96376 TX/PRO/DX INJ SAME DRUG ADON: CPT

## 2025-07-14 PROCEDURE — 85045 AUTOMATED RETICULOCYTE COUNT: CPT | Performed by: EMERGENCY MEDICINE

## 2025-07-14 PROCEDURE — 87040 BLOOD CULTURE FOR BACTERIA: CPT | Performed by: HOSPITALIST

## 2025-07-14 PROCEDURE — 84484 ASSAY OF TROPONIN QUANT: CPT | Performed by: EMERGENCY MEDICINE

## 2025-07-14 PROCEDURE — 36415 COLL VENOUS BLD VENIPUNCTURE: CPT | Performed by: HOSPITALIST

## 2025-07-14 PROCEDURE — 87154 CUL TYP ID BLD PTHGN 6+ TRGT: CPT | Performed by: HOSPITALIST

## 2025-07-14 PROCEDURE — 25000242 PHARM REV CODE 250 ALT 637 W/ HCPCS: Performed by: EMERGENCY MEDICINE

## 2025-07-14 PROCEDURE — 99222 1ST HOSP IP/OBS MODERATE 55: CPT | Mod: 25,,, | Performed by: INTERNAL MEDICINE

## 2025-07-14 PROCEDURE — 83880 ASSAY OF NATRIURETIC PEPTIDE: CPT | Performed by: EMERGENCY MEDICINE

## 2025-07-14 PROCEDURE — 93005 ELECTROCARDIOGRAM TRACING: CPT

## 2025-07-14 PROCEDURE — 96375 TX/PRO/DX INJ NEW DRUG ADDON: CPT

## 2025-07-14 PROCEDURE — 85730 THROMBOPLASTIN TIME PARTIAL: CPT | Performed by: EMERGENCY MEDICINE

## 2025-07-14 PROCEDURE — 21400001 HC TELEMETRY ROOM

## 2025-07-14 PROCEDURE — 96374 THER/PROPH/DIAG INJ IV PUSH: CPT

## 2025-07-14 PROCEDURE — 94640 AIRWAY INHALATION TREATMENT: CPT

## 2025-07-14 PROCEDURE — 84484 ASSAY OF TROPONIN QUANT: CPT | Performed by: HOSPITALIST

## 2025-07-14 RX ORDER — METOPROLOL TARTRATE 25 MG/1
25 TABLET, FILM COATED ORAL 2 TIMES DAILY
Status: DISCONTINUED | OUTPATIENT
Start: 2025-07-14 | End: 2025-07-18 | Stop reason: HOSPADM

## 2025-07-14 RX ORDER — MORPHINE SULFATE 30 MG/1
30 TABLET, FILM COATED, EXTENDED RELEASE ORAL EVERY 12 HOURS
Refills: 0 | Status: DISCONTINUED | OUTPATIENT
Start: 2025-07-14 | End: 2025-07-18 | Stop reason: HOSPADM

## 2025-07-14 RX ORDER — POTASSIUM CHLORIDE 20 MEQ/1
40 TABLET, EXTENDED RELEASE ORAL ONCE
Status: COMPLETED | OUTPATIENT
Start: 2025-07-14 | End: 2025-07-14

## 2025-07-14 RX ORDER — HYDROMORPHONE HYDROCHLORIDE 2 MG/ML
2 INJECTION, SOLUTION INTRAMUSCULAR; INTRAVENOUS; SUBCUTANEOUS
Refills: 0 | Status: COMPLETED | OUTPATIENT
Start: 2025-07-14 | End: 2025-07-14

## 2025-07-14 RX ORDER — HYDROMORPHONE HYDROCHLORIDE 2 MG/ML
2 INJECTION, SOLUTION INTRAMUSCULAR; INTRAVENOUS; SUBCUTANEOUS
Refills: 0 | Status: DISCONTINUED | OUTPATIENT
Start: 2025-07-14 | End: 2025-07-15

## 2025-07-14 RX ORDER — HYDROMORPHONE HYDROCHLORIDE 2 MG/ML
2 INJECTION, SOLUTION INTRAMUSCULAR; INTRAVENOUS; SUBCUTANEOUS EVERY 4 HOURS PRN
Status: CANCELLED | OUTPATIENT
Start: 2025-07-14

## 2025-07-14 RX ORDER — SODIUM CHLORIDE 0.9 % (FLUSH) 0.9 %
10 SYRINGE (ML) INJECTION
Status: DISCONTINUED | OUTPATIENT
Start: 2025-07-14 | End: 2025-07-18 | Stop reason: HOSPADM

## 2025-07-14 RX ORDER — IPRATROPIUM BROMIDE AND ALBUTEROL SULFATE 2.5; .5 MG/3ML; MG/3ML
3 SOLUTION RESPIRATORY (INHALATION)
Status: COMPLETED | OUTPATIENT
Start: 2025-07-14 | End: 2025-07-14

## 2025-07-14 RX ORDER — FOLIC ACID 1 MG/1
1 TABLET ORAL DAILY
Status: DISCONTINUED | OUTPATIENT
Start: 2025-07-14 | End: 2025-07-18 | Stop reason: HOSPADM

## 2025-07-14 RX ORDER — SODIUM CHLORIDE 9 MG/ML
INJECTION, SOLUTION INTRAVENOUS CONTINUOUS
Status: DISCONTINUED | OUTPATIENT
Start: 2025-07-14 | End: 2025-07-18 | Stop reason: HOSPADM

## 2025-07-14 RX ORDER — TIZANIDINE 4 MG/1
4 TABLET ORAL EVERY 8 HOURS PRN
Status: DISCONTINUED | OUTPATIENT
Start: 2025-07-14 | End: 2025-07-15

## 2025-07-14 RX ORDER — ONDANSETRON HYDROCHLORIDE 2 MG/ML
4 INJECTION, SOLUTION INTRAVENOUS EVERY 8 HOURS PRN
Status: DISCONTINUED | OUTPATIENT
Start: 2025-07-14 | End: 2025-07-18 | Stop reason: HOSPADM

## 2025-07-14 RX ORDER — WARFARIN 2 MG/1
4 TABLET ORAL DAILY
Status: DISCONTINUED | OUTPATIENT
Start: 2025-07-14 | End: 2025-07-16

## 2025-07-14 RX ORDER — ACETAMINOPHEN 500 MG
1000 TABLET ORAL EVERY 8 HOURS
Status: DISCONTINUED | OUTPATIENT
Start: 2025-07-14 | End: 2025-07-18 | Stop reason: HOSPADM

## 2025-07-14 RX ORDER — NALOXONE HCL 0.4 MG/ML
0.02 VIAL (ML) INJECTION
Status: DISCONTINUED | OUTPATIENT
Start: 2025-07-14 | End: 2025-07-18 | Stop reason: HOSPADM

## 2025-07-14 RX ORDER — DIPHENHYDRAMINE HYDROCHLORIDE 50 MG/ML
25 INJECTION, SOLUTION INTRAMUSCULAR; INTRAVENOUS
Status: COMPLETED | OUTPATIENT
Start: 2025-07-14 | End: 2025-07-14

## 2025-07-14 RX ORDER — GABAPENTIN 300 MG/1
600 CAPSULE ORAL 3 TIMES DAILY
Status: DISCONTINUED | OUTPATIENT
Start: 2025-07-14 | End: 2025-07-18 | Stop reason: HOSPADM

## 2025-07-14 RX ORDER — HYDROMORPHONE HYDROCHLORIDE 1 MG/ML
1 INJECTION, SOLUTION INTRAMUSCULAR; INTRAVENOUS; SUBCUTANEOUS
Refills: 0 | Status: COMPLETED | OUTPATIENT
Start: 2025-07-14 | End: 2025-07-14

## 2025-07-14 RX ORDER — DIPHENHYDRAMINE HYDROCHLORIDE 50 MG/ML
50 INJECTION, SOLUTION INTRAMUSCULAR; INTRAVENOUS EVERY 6 HOURS PRN
Status: DISCONTINUED | OUTPATIENT
Start: 2025-07-14 | End: 2025-07-16

## 2025-07-14 RX ORDER — TALC
6 POWDER (GRAM) TOPICAL NIGHTLY PRN
Status: DISCONTINUED | OUTPATIENT
Start: 2025-07-14 | End: 2025-07-15

## 2025-07-14 RX ORDER — HYDROMORPHONE HYDROCHLORIDE 2 MG/ML
2 INJECTION, SOLUTION INTRAMUSCULAR; INTRAVENOUS; SUBCUTANEOUS ONCE
Status: COMPLETED | OUTPATIENT
Start: 2025-07-14 | End: 2025-07-14

## 2025-07-14 RX ORDER — FLUOXETINE 20 MG/1
80 CAPSULE ORAL DAILY
Status: DISCONTINUED | OUTPATIENT
Start: 2025-07-14 | End: 2025-07-18 | Stop reason: HOSPADM

## 2025-07-14 RX ORDER — PANTOPRAZOLE SODIUM 40 MG/1
40 TABLET, DELAYED RELEASE ORAL DAILY
Status: DISCONTINUED | OUTPATIENT
Start: 2025-07-14 | End: 2025-07-15

## 2025-07-14 RX ORDER — METOPROLOL TARTRATE 50 MG/1
50 TABLET ORAL 2 TIMES DAILY
Status: DISCONTINUED | OUTPATIENT
Start: 2025-07-14 | End: 2025-07-14

## 2025-07-14 RX ORDER — HYDROMORPHONE HYDROCHLORIDE 1 MG/ML
1 INJECTION, SOLUTION INTRAMUSCULAR; INTRAVENOUS; SUBCUTANEOUS EVERY 4 HOURS PRN
Status: CANCELLED | OUTPATIENT
Start: 2025-07-14

## 2025-07-14 RX ORDER — ASPIRIN 325 MG
325 TABLET ORAL DAILY
Status: DISCONTINUED | OUTPATIENT
Start: 2025-07-14 | End: 2025-07-18 | Stop reason: HOSPADM

## 2025-07-14 RX ADMIN — FLUOXETINE HYDROCHLORIDE 80 MG: 20 CAPSULE ORAL at 10:07

## 2025-07-14 RX ADMIN — GABAPENTIN 600 MG: 300 CAPSULE ORAL at 10:07

## 2025-07-14 RX ADMIN — DIPHENHYDRAMINE HYDROCHLORIDE 50 MG: 50 INJECTION INTRAMUSCULAR; INTRAVENOUS at 04:07

## 2025-07-14 RX ADMIN — MORPHINE SULFATE 30 MG: 30 TABLET, FILM COATED, EXTENDED RELEASE ORAL at 09:07

## 2025-07-14 RX ADMIN — FOLIC ACID 1 MG: 1 TABLET ORAL at 10:07

## 2025-07-14 RX ADMIN — ACETAMINOPHEN 1000 MG: 500 TABLET, FILM COATED ORAL at 02:07

## 2025-07-14 RX ADMIN — MORPHINE SULFATE 30 MG: 30 TABLET, FILM COATED, EXTENDED RELEASE ORAL at 10:07

## 2025-07-14 RX ADMIN — SODIUM CHLORIDE: 9 INJECTION, SOLUTION INTRAVENOUS at 10:07

## 2025-07-14 RX ADMIN — HYDROMORPHONE HYDROCHLORIDE 2 MG: 2 INJECTION INTRAMUSCULAR; INTRAVENOUS; SUBCUTANEOUS at 04:07

## 2025-07-14 RX ADMIN — ASPIRIN 325 MG: 325 TABLET ORAL at 10:07

## 2025-07-14 RX ADMIN — WARFARIN SODIUM 4 MG: 2 TABLET ORAL at 05:07

## 2025-07-14 RX ADMIN — HYDROMORPHONE HYDROCHLORIDE 2 MG: 2 INJECTION, SOLUTION INTRAMUSCULAR; INTRAVENOUS; SUBCUTANEOUS at 08:07

## 2025-07-14 RX ADMIN — PANTOPRAZOLE SODIUM 40 MG: 40 TABLET, DELAYED RELEASE ORAL at 10:07

## 2025-07-14 RX ADMIN — TIZANIDINE 4 MG: 4 TABLET ORAL at 10:07

## 2025-07-14 RX ADMIN — POTASSIUM CHLORIDE 40 MEQ: 1500 TABLET, EXTENDED RELEASE ORAL at 10:07

## 2025-07-14 RX ADMIN — HYDROMORPHONE HYDROCHLORIDE 2 MG: 2 INJECTION INTRAMUSCULAR; INTRAVENOUS; SUBCUTANEOUS at 12:07

## 2025-07-14 RX ADMIN — DIPHENHYDRAMINE HYDROCHLORIDE 50 MG: 50 INJECTION INTRAMUSCULAR; INTRAVENOUS at 10:07

## 2025-07-14 RX ADMIN — HYDROMORPHONE HYDROCHLORIDE 2 MG: 2 INJECTION, SOLUTION INTRAMUSCULAR; INTRAVENOUS; SUBCUTANEOUS at 09:07

## 2025-07-14 RX ADMIN — GABAPENTIN 600 MG: 300 CAPSULE ORAL at 09:07

## 2025-07-14 RX ADMIN — IOHEXOL 100 ML: 350 INJECTION, SOLUTION INTRAVENOUS at 11:07

## 2025-07-14 RX ADMIN — HYDROMORPHONE HYDROCHLORIDE 1 MG: 1 INJECTION, SOLUTION INTRAMUSCULAR; INTRAVENOUS; SUBCUTANEOUS at 07:07

## 2025-07-14 RX ADMIN — IPRATROPIUM BROMIDE AND ALBUTEROL SULFATE 3 ML: .5; 3 SOLUTION RESPIRATORY (INHALATION) at 07:07

## 2025-07-14 RX ADMIN — GABAPENTIN 600 MG: 300 CAPSULE ORAL at 02:07

## 2025-07-14 RX ADMIN — DIPHENHYDRAMINE HYDROCHLORIDE 50 MG: 50 INJECTION INTRAMUSCULAR; INTRAVENOUS at 07:07

## 2025-07-14 RX ADMIN — METOPROLOL TARTRATE 25 MG: 25 TABLET, FILM COATED ORAL at 09:07

## 2025-07-14 RX ADMIN — DIPHENHYDRAMINE HYDROCHLORIDE 25 MG: 50 INJECTION INTRAMUSCULAR; INTRAVENOUS at 07:07

## 2025-07-14 RX ADMIN — SODIUM CHLORIDE: 9 INJECTION, SOLUTION INTRAVENOUS at 09:07

## 2025-07-14 RX ADMIN — HYDROMORPHONE HYDROCHLORIDE 2 MG: 2 INJECTION INTRAMUSCULAR; INTRAVENOUS; SUBCUTANEOUS at 07:07

## 2025-07-14 RX ADMIN — SODIUM CHLORIDE 1000 ML: 0.9 INJECTION, SOLUTION INTRAVENOUS at 07:07

## 2025-07-14 RX ADMIN — METOPROLOL TARTRATE 25 MG: 25 TABLET, FILM COATED ORAL at 10:07

## 2025-07-14 RX ADMIN — ACETAMINOPHEN 1000 MG: 500 TABLET, FILM COATED ORAL at 10:07

## 2025-07-14 RX ADMIN — ACETAMINOPHEN 1000 MG: 500 TABLET, FILM COATED ORAL at 09:07

## 2025-07-14 RX ADMIN — TIZANIDINE 4 MG: 4 TABLET ORAL at 02:07

## 2025-07-14 RX ADMIN — HYDROMORPHONE HYDROCHLORIDE 2 MG: 2 INJECTION INTRAMUSCULAR; INTRAVENOUS; SUBCUTANEOUS at 10:07

## 2025-07-14 NOTE — ASSESSMENT & PLAN NOTE
Patient with a history of chronic pain for which she takes chronic opioids at home.  Likely with significant opioid tolerance. Will utilize prior pain medication requirements to help guide initial treatment doses of acute pain well best getting underlying cause of her worsening pain.   Pt unable to removed earrings, educated at risk for burns, states understanding.

## 2025-07-14 NOTE — HPI
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 this morning.  Patient reports  shortness of the breath.  Patient took home pain medication without relief.  She reports her typical sickle cell pain crisis involves pain in her lower extremities.  She denies significant lower extremity pain currently.  She denies any fever, cough, recent sick contacts or travel.  She reports no relief despite receiving intravenous pain medication in the emergency department.

## 2025-07-14 NOTE — ASSESSMENT & PLAN NOTE
History of prior pulmonary embolism and chronic thrombosis of the left and right internal jugular veins  on chronic anticoagulation with warfarin.  INR 1.9.  Plan to continue with oral anticoagulation pending results of CT angiogram.

## 2025-07-14 NOTE — ASSESSMENT & PLAN NOTE
She presents with sudden onset of severe chest pain with pressure.  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  Chart review does reveal that she has had chest pain during prior sickle cell pain crisis.  Chest radiograph clear with normal oxygen saturation speaks against an acute chest syndrome. Reticulocyte count also not elevated speaking against significant hemolysis.  In addition to an atypical presentation for sickle cell pain crisis.  Myocardial ischemia and dissection are importance considerations.  Electrocardiogram shows T-wave inversions in the inferior leads concerning for myocardial ischemia.   Initial troponin negative.  Will treat with aspirin, nitroglycerin as needed, statin therapy, beta-blocker.  Trend electrocardiograms and troponins.    CT angiogram of chest and abdomen ruled out aortic dissection.  Consult Cardiology for evaluation.  If cardiac workup unrevealing will plan to treat treat with her usual sickle cell pain regimen.

## 2025-07-14 NOTE — ED NOTES
Pt awake and alert; resting quietly on stretcher.  Pt remains on continuous cardiac and pulse ox monitoring with non-invasive blood pressure to cycle every 30 minutes.  VS stable; NSR noted. Pt continues to complain of chest pain that she is currently rating as a 10/10. Pt informed that she just recently received a Morphine tablet that has not had a chance to take affect yet. Pt encouraged to give the medication a chance to take affect; verbalizes understanding. Pt denies restroom needs at this time; remains on Pure Wick external catheter to continuous low wall suction.Pt remains clean and dry. Pt is able to reposition self on stretcher. Bed locked in lowest position; side rails up and locked x 2; call light, bedside table, and personal belongings within reach. Room assessed for safety measures and cleanliness; no action needed at this time. Plan of care discussed; awaiting CT scan and hospital bed assignment. Pt instructed to alert nurse for assistance and before attempting to get out of bed; verbalizes understanding. Pt denies additional needs at this time; monitoring ongoing.

## 2025-07-14 NOTE — PLAN OF CARE
VN cued into patient's room for introduction with patient's permission.  VN role explained and informed patient/family that VN would be working with bedside nurse and rest of care team.  Plan of care reviewed, pt encouraged to refer to whiteboard to determine staff for the day and pt/family educated on VTE,  fall risk and advised to call for assistance at all times to ensure pt's safety. Pt is without any signs of pain, anxiety, dyspnea or nausea. Patient's chart, labs and vital signs reviewed.  Allowed time for questions.

## 2025-07-14 NOTE — CONSULTS
Cardiology Consult  7/14/2025  12:53 PM    Attending Cardiologist: Ander Fletcher M.D.  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  Patient reports having heaviness in her chest which has been constant since 4:00 a.m. this morning.  The pain is not worse with walking.  He feels better when she is lying on her right side.  She denies any nausea or vomiting.  She reports having shortness of breath.  She reports having history of heart failure due to anemia in the past.    Medications  Current Medications[2]   Prior to Admission medications    Medication Sig Start Date End Date Taking? Authorizing Provider   acetaminophen (TYLENOL) 325 MG tablet Take 2 tablets (650 mg total) by mouth every 8 (eight) hours as needed for Pain. 2/6/25  Yes Aracelis Lema MD   albuterol (VENTOLIN HFA) 90 mcg/actuation inhaler Inhale 2 puffs into the lungs every 6 (six) hours as needed for Wheezing or Shortness of Breath. Rescue 8/1/19  Yes Mohan Keen MBBS   amLODIPine (NORVASC) 10 MG tablet Take 1 tablet (10 mg total) by mouth once daily. 8/31/24  Yes Paola Mckeon MD   ascorbic acid (VITAMIN C ORAL) Take 1 capsule by mouth Daily.   Yes Provider, Historical   dicyclomine (BENTYL) 10 MG capsule Take 10 mg by mouth daily as needed.   Yes Provider, Historical   FLUoxetine 40 MG capsule Take 2 capsules (80 mg total) by mouth once daily. 3/2/25 3/2/26 Yes Sherine Meier MD   fluticasone propionate (FLONASE) 50 mcg/actuation nasal spray 1 spray (50 mcg total) by Each Nostril route daily as  needed for Allergies. 5/29/25  Yes Oksana Phillips MD   folic acid (FOLVITE) 1 MG tablet Take 1 tablet (1 mg total) by mouth once daily. 2/6/25 8/10/25 Yes Aracelis Lema MD   gabapentin (NEURONTIN) 300 MG capsule Take 2-3 capsules (600-900 mg total) by mouth 3 (three) times daily. 4/28/25 4/28/26 Yes Baltazar Barht MD   oxyCODONE-acetaminophen (PERCOCET)  mg per tablet Take 1 tablet by mouth every 6 (six) hours as needed for Pain. 7/3/25 7/18/25 Yes Sherine Meier MD   pantoprazole (PROTONIX) 40 MG tablet Take 1 tablet (40 mg total) by mouth once daily. 4/28/25 4/28/26 Yes Baltazar Barth MD   prazosin (MINIPRESS) 1 MG Cap Take 1 capsule (1 mg total) by mouth every evening. 3/2/25 3/2/26 Yes Sherine Meier MD   senna-docusate 8.6-50 mg (PERICOLACE) 8.6-50 mg per tablet Take 1 tablet by mouth daily as needed for Constipation. 7/12/24  Yes Aftab Nixon MD   tiZANidine (ZANAFLEX) 4 MG tablet Take 1 tablet (4 mg total) by mouth every 8 (eight) hours as needed. 6/21/25 8/2/25 Yes Sherine Meier MD   warfarin (COUMADIN) 4 MG tablet Take 1 tablet (4 mg total) by mouth Daily. As directed by [facility]  Patient taking differently: Take 2 mg by mouth Daily. As directed by the [facility]    2mg on Friday Saturday and Sunday   4mg on other days 6/23/25 11/10/25 Yes Vijaya Soni MD   naloxone (NARCAN) 4 mg/actuation Spry 4mg by nasal route as needed for opioid overdose; may repeat every 2-3 minutes in alternating nostrils until medical help arrives. 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/2017    Acute venous embolism and thrombosis of internal jugular veins     Asthma     Avascular necrosis of femur     Depression     History of pulmonary embolism     Hypertension     Morbid obesity     Opioid dependence 04/16/2017     Pneumonia due to Streptococcus pneumoniae 2017    Right lower lobe pneumonia 2017    Sepsis due to Streptococcus pneumoniae 2017    Sickle cell-beta thalassemia disease with pain     Trigeminal neuralgia      Past Surgical History:   Procedure Laterality Date     SECTION      ESOPHAGOGASTRODUODENOSCOPY N/A 2024    Procedure: EGD (ESOPHAGOGASTRODUODENOSCOPY);  Surgeon: Allen Marshall MD;  Location: [address]);  Service: Gastroenterology;  Laterality: N/A;    TONSILLECTOMY      TUBAL LIGATION       Social History[3]      Allergies  Review of patient's allergies indicates:   Allergen Reactions    Cat dander Anaphylaxis, Itching and Shortness Of Breath    Nsaids (non-steroidal anti-inflammatory drug) Anaphylaxis, Itching and Shortness Of Breath    Latex Rash         Review of Systems   Review of Systems   Constitutional: Negative for decreased appetite, fever and weight loss.   HENT:  Negative for congestion and nosebleeds.    Eyes:  Negative for double vision, vision loss in left eye, vision loss in right eye and visual disturbance.   Cardiovascular:  Positive for chest pain. Negative for claudication, cyanosis, dyspnea on exertion, irregular heartbeat, leg swelling, near-syncope, orthopnea, palpitations, paroxysmal nocturnal dyspnea and syncope.   Respiratory:  Negative for cough, hemoptysis, shortness of breath, sleep disturbances due to breathing, snoring, sputum production and wheezing.    Endocrine: Negative for cold intolerance and heat intolerance.   Skin:  Negative for nail changes and rash.   Musculoskeletal:  Negative for joint pain, muscle cramps, muscle weakness and myalgias.   Gastrointestinal:  Negative for change in bowel habit, heartburn, hematemesis, hematochezia, hemorrhoids and melena.   Neurological:  Negative for dizziness, focal weakness and headaches.         Physical Exam  Wt Readings from Last 1 Encounters:   25 90.7 kg (200 lb)     BP Readings  from Last 3 Encounters:   07/14/25 (!) 155/82   07/04/25 128/73   06/21/25 121/70     Pulse Readings from Last 1 Encounters:   07/14/25 86     Body mass index is 36.58 kg/m².    Physical Exam  Constitutional:       Appearance: She is well-developed.   HENT:      Head: Atraumatic.   Eyes:      General: No scleral icterus.  Neck:      Vascular: Normal carotid pulses. No carotid bruit, hepatojugular reflux or JVD.   Cardiovascular:      Rate and Rhythm: Normal rate and regular rhythm.      Chest Wall: PMI is not displaced.      Pulses: Intact distal pulses.           Carotid pulses are 2+ on the right side and 2+ on the left side.       Radial pulses are 2+ on the right side and 2+ on the left side.        Dorsalis pedis pulses are 2+ on the right side and 2+ on the left side.      Heart sounds: Normal heart sounds, S1 normal and S2 normal. No murmur heard.     No friction rub.   Pulmonary:      Effort: Pulmonary effort is normal. No respiratory distress.      Breath sounds: Normal breath sounds. No stridor. No wheezing or rales.   Chest:      Chest wall: No tenderness.   Abdominal:      General: Bowel sounds are normal.      Palpations: Abdomen is soft.   Musculoskeletal:      Cervical back: Neck supple. No edema.   Skin:     General: Skin is warm and dry.      Nails: There is no clubbing.   Neurological:      Mental Status: She is alert and oriented to person, place, and time.   Psychiatric:         Behavior: Behavior normal.         Thought Content: Thought content normal.           Laboratory:  Trended Lab Data:  Recent Labs   Lab 07/14/25  0731   WBC 10.05   HGB 10.9*   HCT 33.0*   *       Recent Labs   Lab 07/14/25  0731      K 3.2*      CO2 17*   BUN 11      CALCIUM 10.2       Recent Labs   Lab 07/14/25  0731   PROT 8.5*   ALBUMIN 4.0   BILITOT 1.0   AST 21   ALT 14   ALKPHOS 105       Recent Labs   Lab 07/10/25  1510 07/14/25  0731   PROTIME 41.6* 20.5*   INR 4.2* 1.9*       Cardiac:    Recent Labs   Lab 07/14/25  0731 07/14/25  0916   TROPONINI <0.006 <0.006   BNP 36  --        FLP:   Lab Results   Component Value Date    CHOL 128 12/22/2020    HDL 25 (L) 12/22/2020    LDLCALC 84.6 12/22/2020    TRIG 92 12/22/2020    CHOLHDL 19.5 (L) 12/22/2020     DM:   Lab Results   Component Value Date    HGBA1C 4.8 12/22/2020    HGBA1C 5.4 04/25/2019    LDLCALC 84.6 12/22/2020    CREATININE 1.2 07/14/2025     Thyroid:   Lab Results   Component Value Date    TSH 0.15 (L) 06/06/2025    FREET4 1.11 05/05/2025     Anemia:   Lab Results   Component Value Date    IRON 35 02/11/2025    TIBC 514 (H) 02/11/2025    FERRITIN 50 02/11/2025    IKAOESNA75 399 02/04/2025    FOLATE 5.0 12/22/2020     Urinalysis:   Lab Results   Component Value Date    LABURIN No growth 04/28/2017    COLORU Yellow 06/29/2025    SPECGRAV 1.010 06/29/2025    NITRITE Negative 06/29/2025    KETONESU Negative 03/14/2025    UROBILINOGEN Negative 06/29/2025     @    Other Results:  EKG (my interpretation):    TELEMETRY:  sinus    Echo:   Results for orders placed or performed during the hospital encounter of 01/07/25   Echo   Result Value Ref Range    LV Diastolic Volume 159.90 mL    Echo EF Estimated 64 %    LV Systolic Volume 57.46 mL    IVS 1.1 0.6 - 1.1 cm    LVIDd 5.7 3.5 - 6.0 cm    LVIDs 3.7 2.1 - 4.0 cm    LVOT diameter 2.3 cm    PW 1.1 0.6 - 1.1 cm    AV LVOT peak gradient 4 mmHg    LVOT mn grad 2 mmHg    LVOT peak paras 1.0 m/s    LVOT peak VTI 23.6 cm    RV- nicolas basal diam 3.0 cm    RV S' 17.35 cm/s    LA size 3.27 cm    Left Atrium Major Axis 5.20 cm    Left Atrium Minor Axis 4.98 cm    LA Vol (MOD) 77.96 mL    RA Major Axis 3.71 cm    RA Area 10.4 cm2    AV valve area 2.8 cm2    AV area by cont VTI 2.7 cm2    AV peak gradient 14.4 mmHg    AV mean gradient 7.3 mmHg    Ao peak paras 1.9 m/s    Ao VTI 35.4 cm    MV Peak A Paras 0.85 m/s    E wave deceleration time 231.75 ms    MV Peak E Paras 0.71 m/s    E/A ratio 0.84     LV LATERAL E/E' RATIO  "10.14     LV SEPTAL E/E' RATIO 11.83     TDI LATERAL 0.07 m/s    TDI SEPTAL 0.06 m/s    Ascending aorta 3.46 cm    STJ 3.14 cm    P vein A 0.2 m/s    MV "A" wave duration 62.80 ms    Pulm vein "A" wave 62.80 ms    PV Peak D Paras 0.32 m/s    PV Peak S Paras 0.76 m/s    Sinus 3.52 cm    LA WIDTH 3.79 cm    RA Width 2.43 cm    TAPSE 1.95 cm    LVOT stroke volume 98.0 cm3    LV Systolic Volume Index 29.6 mL/m2    LV Diastolic Volume Index 82.42 mL/m2    LVOT area 4.2 cm2    FS 35.1 28 - 44 %    Left Ventricle Relative Wall Thickness 0.39 cm    LV mass 256.7 g    LV Mass Index 132.3 g/m2    E/E' ratio 10.92 m/s    JOLANTA 27.6 mL/m2    LA Vol 53.59 cm3    Pulm vein S/D ratio 2.38     RV/LV Ratio 0.53 cm    JOLANTA (MOD) 40.2 mL/m2    AV Velocity Ratio 0.53     AV index (prosthetic) 0.67     SANDRA by Velocity Ratio 2.2 cm²    Mean e' 0.07 m/s    ZLVIDS 0.69     ZLVIDD 0.34     EF 63 %    Est. RA pres 3 mmHg    Narrative      Left Ventricle: The left ventricle is mildly dilated. Mildly increased   ventricular mass. Mildly increased wall thickness. Mild septal thickening.   There is eccentric hypertrophy. Normal wall motion. There is normal   systolic function with a visually estimated ejection fraction of 60 - 65%.   Ejection fraction is approximately 63%. There is diastolic dysfunction but   grade cannot be determined.    Right Ventricle: Normal right ventricular cavity size. Wall thickness   is normal. Systolic function is normal.    IVC/SVC: Normal venous pressure at 3 mmHg.         Radiology:  CTA Chest Abdomen Non Coronary (XPD)  Result Date: 7/14/2025  EXAMINATION: CTA CHEST ABDOMEN NON CORONARY (XPD) CLINICAL HISTORY: Aortic aneurysm, known or suspected; FINDINGS: Comparison is 04/11/2025. Patient was administered 100 cc of Omnipaque 350 intravenously. Precontrast images demonstrate no intimal flap.  There is no gallstone renal ureter or bladder calculi.  There is an aberrant right subclavian artery.  Arch and great vessels " are otherwise unremarkable.  No aortic dissection or aneurysm seen.  Celiac, SMA, right left renal arteries are unremarkable.  RITU and pelvic vessels are normal.  No aneurysm or dissection seen.  No mediastinal, hilar, or axillary adenopathy seen.  There is transient hepatic arterial dilatation posteriorly.  Liver gallbladder, biliary tree show nothing unusual.  Spleen is small and partially infarcted.  Pancreas, duodenum, gallbladder, biliary tree, and adrenal glands are unremarkable.  The kidneys enhance and excrete normally.  Vessels enhance normally.  No para-aortic, retroperitoneal, or mesenteric adenopathy seen.  Lower pelvis not imaged.  Bowel loops demonstrate nothing unusual.  Bones demonstrate DJD but nothing focal.  There is a small left lung granuloma.     No evidence of aneurysm, dissection, thrombus, or stenosis. Aberrant right subclavian artery. Small spleen and possible remote splenic infarcts.  No Electronically signed by: Austin Santana MD Date:    07/14/2025 Time:    11:54    X-Ray Chest AP Portable  Result Date: 7/14/2025  EXAMINATION: XR CHEST AP PORTABLE CLINICAL HISTORY: Chest Pain; TECHNIQUE: Single frontal view of the chest was performed. COMPARISON: 06/15/2025 FINDINGS: Cardiac size is normal.  Vascular catheter seen with its tip in the superior vena cava.  Lungs are well expanded no infiltrate or vascular congestion.     See above Electronically signed by: Luis Manuel Reed MD Date:    07/14/2025 Time:    07:31    MRI Knee Without Contrast Right  Result Date: 6/29/2025  EXAMINATION: MRI KNEE WITHOUT CONTRAST RIGHT CLINICAL HISTORY: Knee pain, chronic, positive xray (Age >= 5y); TECHNIQUE: Multiplanar, multisequence images were performed about the RIGHT knee. COMPARISON: None FINDINGS: **MENISCI: Medial meniscus: Intact anterior horn, body, and posterior horn. Lateral meniscus: Intact anterior horn, body, and posterior horn. Meniscal root attachment: Intact Popliteal hiatus, superior and  inferior meniscal fascicles:Intact and visualized. **LIGAMENTS: Anterior cruciate ligament: Continuous, with normal signal. Posterior cruciate ligament: Continuous, with normal signal. Medial collateral ligament: Intact yet thickened, likely reflective of chronic injury Lateral collateral ligament and  biceps femoris: Intact. Iliotibial band (ITB): No evidence for ITB syndrome. Popliteus tendon and popliteofibular ligament: Intact. Posterior medial and posterior lateral corners: Intact. **TENDONS: Quadriceps tendon: Intact. Patella tendon: Intact. Joint fluid: Physiologic. Hoffa's fat pad: Normal. Intact medial retinaculum/ MPFL. Intact lateral retinaculum. **CARTILAGE: Patellofemoral:Full-thickness cartilage loss medial and  lateral patellar facet cartilage.Median ridge demonstrates near full-thickness cartilage loss.  Fissuring, full-thickness trochlear groove cartilage.  Full-thickness cartilage lossanterior medial and anterior lateral femoral trochlea facet with exuberant osteophytosis. Medial tibiofemoral: Articular cartilage demonstrates cartilaginous irregularity with marginal osteophytosis yet no subchondral marrow edema.Internal aspect of medial femoral condyle cartilage demonstrates cartilage irregularity.. Lateral tibiofemoral: Articular cartilage is preserved without focal defects or subchondral marrow edema.Marginal osteophytosis. **OTHER: Bone marrow: No fracture or marrow replacing process. Miscellaneous: The gastrocnemius muscles are normal.Proximal tibia-fibula joint relationships preserved. No evident plica thickening.Multiple loose bodies identified in the suprapatellar bursa, up to 2.1 cm     Osteoarthritis predominantly at the level of the patellofemoral joint and to a lesser degree medial tibiofemoral joint.  Multiple loose bodies identified suprapatellar bursa. Electronically signed by: Nelson Mills MD Date:    06/29/2025 Time:    16:39    MRI Hip Without Contrast Right  Result Date:  6/29/2025  EXAMINATION: MRI HIP WITHOUT CONTRAST RIGHT CLINICAL HISTORY: Hip pain, chronic, tendon/ligament abnormality suspected, xray done; TECHNIQUE: Multiplanar, multisequence imaging of the pelvis and bilateral hip without the use of contrast COMPARISON: 06/15/2025 and 02/28/2021 FINDINGS: Images degraded by motion artifact limiting evaluation. Osseous Structures: No fracture.  Chronic bone infarcts identified LEFT femoral neck and proximal shaft.  No evidence for acute avascular necrosis..No marrow signal abnormality to suggest bone marrow replacement process. Hip and Sacroiliac joints: No evidence of joint effusion.  No gross abnormalities of the acetabular harshal are shown.  MR arthrogram would be necessary for complete evaluation of the harshal, if clinically indicated. Bursae: No iliopsoas bursitis.  Bilateral trochanteric bursitis Soft Tissues: Muscles and tendons of the pelvis and both hips show no atrophy, edema, mass, tears or other abnormalities.     Bilateral trochanteric bursitis LEFT greater than RIGHT. Electronically signed by: Nelson Mills MD Date:    06/29/2025 Time:    16:34    X-Ray Tibia Fibula 2 View Right  Result Date: 6/28/2025  EXAMINATION: XR TIBIA FIBULA 2 VIEW RIGHT CLINICAL HISTORY: Pain in leg, unspecified TECHNIQUE: AP and lateral views of the right tibia and fibula were performed. COMPARISON: September 13, 2024 FINDINGS: No displaced fracture or subluxation.  No suspicious bone lesion. Unremarkable soft tissues.     No acute abnormality. Electronically signed by: Jarret Bentley MD Date:    06/28/2025 Time:    14:45    US Soft Tissue Chest_Upper Back  Result Date: 6/19/2025  EXAMINATION: US SOFT TISSUE CHEST_UPPER BACK CLINICAL HISTORY: Palpable lesion; FINDINGS: Soft tissue chest upper back. Inferior to the left clavicle the area of concern demonstrated edema definite mass, abscess, fluid collection, or vascular malformation seen. Electronically signed by: Austin Santana MD  Date:    06/19/2025 Time:    15:25    X-Ray Hip 2 or 3 views Left with Pelvis when performed  Result Date: 6/15/2025  EXAMINATION: XR HIP WITH PELVIS WHEN PERFORMED 2 OR 3 VIEWS LEFT CLINICAL HISTORY: Hip pain; TECHNIQUE: AP view of the pelvis and frog leg lateral view of the left hip were performed. COMPARISON: 03/25/2025. FINDINGS: No evidence of acute displaced fracture, dislocation, or osseous destructive process.  Hip joint spaces appear fairly well maintained.     No acute displaced fracture seen. Electronically signed by: Clarissa Antonio MD Date:    06/15/2025 Time:    17:30    X-Ray Chest AP Portable  Result Date: 6/15/2025  EXAMINATION: XR CHEST AP PORTABLE CLINICAL HISTORY: sickle cell pain; TECHNIQUE: Frontal view of the chest was performed. COMPARISON: 05/28/2025 FINDINGS: Right chest port in stable position.  The cardiomediastinal silhouette is upper normal in size, similar to prior. The lungs appear symmetrically expanded with few coarse interstitial markings.  No new confluent pulmonary parenchymal opacity. No pleural fluid or pneumothorax.     As above Electronically signed by: Pee Decker MD Date:    06/15/2025 Time:    13:45        Current Medications:     Infusions:   0.9% NaCl   Intravenous Continuous 100 mL/hr at 07/14/25 1015 New Bag at 07/14/25 1015        Scheduled:   acetaminophen  1,000 mg Oral Q8H    aspirin  325 mg Oral Daily    FLUoxetine  80 mg Oral Daily    folic acid  1 mg Oral Daily    gabapentin  600 mg Oral TID    metoprolol tartrate  25 mg Oral BID    morphine  30 mg Oral Q12H    pantoprazole  40 mg Oral Daily    warfarin  4 mg Oral Daily        PRN:    Current Facility-Administered Medications:     diphenhydrAMINE, 50 mg, Intravenous, Q6H PRN    HYDROmorphone, 2 mg, Intravenous, Q3H PRN    melatonin, 6 mg, Oral, Nightly PRN    naloxone, 0.02 mg, Intravenous, PRN    ondansetron, 4 mg, Intravenous, Q8H PRN    sodium chloride 0.9%, 10 mL, Intravenous, PRN    tiZANidine, 4 mg,  Oral, Q8H PRN      Assessment and Plan:  Atypical prolonged CP relieved with lying on her right side.  Doubt cardiac cause of CP, h/o sickle pain crisis  EKG with ST and T waves abnormalities.  Negative troponins x 2  -fu echo and CTA of lungs      Thank you for allowing me to participate in the care of Nazanin Malone.      Ander Fletcher MD, F.A.C.C, F.S.C.A.I.    Disclaimer: This document was created using voice recognition software ([employer] Fluency Direct). Although it may be edited, this document may contain errors related to incorrect recognition of the spoken word. Please call the physician if clarification is needed.          [1]   Patient Active Problem List  Diagnosis    Iron deficiency anemia    Vaso-occlusive pain due to sickle cell disease    Hypertension    Drug dependence    Obesity (BMI 30-39.9)    Trigeminal neuralgia    Anxiety and depression    Sickle cell disease, type S beta-zero thalassemia with pain    Intermittent asthma    Hb-SS disease with vaso-occlusive pain    Iron deficiency anemia due to chronic blood loss    Folate deficiency    Menorrhagia with regular cycle    History of pulmonary embolism    Morbid obesity    QT prolongation    Left-sided weakness    Warfarin anticoagulation    Anemia    Chronic gastritis    Drug-seeking behavior    Abdominal pain    Diarrhea    Avascular necrosis of femur    Hypercoagulable state    Urinary retention    Hx pulmonary embolism    Chronic prescription opiate use    Superficial venous thrombosis of left arm    Upper respiratory infection    MDD (major depressive disorder), recurrent episode    Trauma and stressor-related disorder    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    Hematoma of right breast    Coagulopathy    Hypokalemia    UTI (urinary tract infection)    Asthma    Vaginal yeast infection    Primary osteoarthritis of right knee    Cervical spinal stenosis    Obesity    History of venous thromboembolism    Chronic pain syndrome    [2]   Current Facility-Administered Medications   Medication Dose Route Frequency Provider Last Rate Last Admin    0.9% NaCl infusion   Intravenous Continuous Shlomo Aguirre  mL/hr at 07/14/25 1015 New Bag at 07/14/25 1015    acetaminophen tablet 1,000 mg  1,000 mg Oral Q8H Shlomo Aguirre MD   1,000 mg at 07/14/25 1008    aspirin tablet 325 mg  325 mg Oral Daily Shlomo Aguirre MD   325 mg at 07/14/25 1008    diphenhydrAMINE injection 50 mg  50 mg Intravenous Q6H PRN Shlomo Aguirre MD   50 mg at 07/14/25 1005    FLUoxetine capsule 80 mg  80 mg Oral Daily Shlomo Aguirre MD   80 mg at 07/14/25 1029    folic acid tablet 1 mg  1 mg Oral Daily Shlomo Aguirre MD   1 mg at 07/14/25 1008    gabapentin capsule 600 mg  600 mg Oral TID Shlomo Aguirre MD   600 mg at 07/14/25 1008    HYDROmorphone (PF) injection 2 mg  2 mg Intravenous Q3H PRN Shlomo Aguirre MD        melatonin tablet 6 mg  6 mg Oral Nightly PRN Ana Philip MD        metoprolol tartrate (LOPRESSOR) tablet 25 mg  25 mg Oral BID Shlomo Aguirre MD   25 mg at 07/14/25 1008    morphine 12 hr tablet 30 mg  30 mg Oral Q12H Shlomo Aguirre MD   30 mg at 07/14/25 1059    naloxone 0.4 mg/mL injection 0.02 mg  0.02 mg Intravenous PRN Shlomo Aguirre MD        ondansetron injection 4 mg  4 mg Intravenous Q8H PRN Ana Philip MD        pantoprazole EC tablet 40 mg  40 mg Oral Daily Shlomo Aguirre MD   40 mg at 07/14/25 1008    sodium chloride 0.9% flush 10 mL  10 mL Intravenous PRN Ana Philip MD        tiZANidine tablet 4 mg  4 mg Oral Q8H PRN Shlomo Aguirre MD        warfarin (COUMADIN) tablet 4 mg  4 mg Oral Daily Shlomo Aguirre MD       [3]   Social History  Socioeconomic History    Marital status: Single   Tobacco Use    Smoking status: Never    Smokeless tobacco: Never   Vaping Use    Vaping status: Never Used   Substance and Sexual Activity    Alcohol use: No    Drug use: Yes     Types:  Marijuana     Comment: periodically     Sexual activity: Not Currently     Birth control/protection: See Surgical Hx     Social Drivers of Health     Financial Resource Strain: Medium Risk (6/28/2025)    Overall Financial Resource Strain (CARDIA)     Difficulty of Paying Living Expenses: Somewhat hard   Food Insecurity: Food Insecurity Present (6/28/2025)    Hunger Vital Sign     Worried About Running Out of Food in the Last Year: Sometimes true     Ran Out of Food in the Last Year: Sometimes true   Transportation Needs: No Transportation Needs (6/28/2025)    PRAPARE - Transportation     Lack of Transportation (Medical): No     Lack of Transportation (Non-Medical): No   Physical Activity: Inactive (5/21/2025)    Exercise Vital Sign     Days of Exercise per Week: 0 days     Minutes of Exercise per Session: 0 min   Stress: No Stress Concern Present (6/28/2025)    [employer] - Occupational Stress Questionnaire     Feeling of Stress : Only a little   Recent Concern: Stress - Stress Concern Present (4/12/2025)    [employer] [employer] [facility] - Occupational Stress Questionnaire     Feeling of Stress : To some extent   Housing Stability: Low Risk  (6/28/2025)    Housing Stability Vital Sign     Unable to Pay for Housing in the Last Year: No     Homeless in the Last Year: No

## 2025-07-14 NOTE — ED NOTES
Room assignment received. EDSBAR completed. Call made to 3C to inform receiving nurse to view report in SBAR. Informed pt will be coming to floor in 30 minutes.

## 2025-07-14 NOTE — ASSESSMENT & PLAN NOTE
Body mass index is 36.58 kg/m². Morbid obesity complicates all aspects of disease management from diagnostic modalities to treatment. Weight loss encouraged and health benefits explained to patient.

## 2025-07-14 NOTE — ASSESSMENT & PLAN NOTE
Patient did not take her morning blood pressure medications.  Restart blood pressure medications now.

## 2025-07-14 NOTE — SUBJECTIVE & OBJECTIVE
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    Acute venous embolism and thrombosis of internal jugular veins     Asthma     Avascular necrosis of femur     Depression     History of pulmonary embolism     Hypertension     Morbid obesity     Opioid dependence 2017    Pneumonia due to Streptococcus pneumoniae 2017    Right lower lobe pneumonia 2017    Sepsis due to Streptococcus pneumoniae 2017    Sickle cell-beta thalassemia disease with pain     Trigeminal neuralgia        Past Surgical History:   Procedure Laterality Date     SECTION      ESOPHAGOGASTRODUODENOSCOPY N/A 2024    Procedure: EGD (ESOPHAGOGASTRODUODENOSCOPY);  Surgeon: Allen Marshall MD;  Location: [address]);  Service: Gastroenterology;  Laterality: N/A;    TONSILLECTOMY      TUBAL LIGATION         Review of patient's allergies indicates:   Allergen Reactions    Cat dander Anaphylaxis, Itching and Shortness Of Breath    Nsaids (non-steroidal anti-inflammatory drug) Anaphylaxis, Itching and Shortness Of Breath    Latex Rash       No current facility-administered medications on file prior to encounter.     Current Outpatient Medications on File Prior to Encounter   Medication Sig    acetaminophen (TYLENOL) 325 MG tablet Take 2 tablets (650 mg total) by mouth every 8 (eight) hours as needed for Pain.    albuterol (VENTOLIN HFA) 90 mcg/actuation inhaler Inhale 2 puffs into the lungs every 6 (six) hours as needed for Wheezing or Shortness of Breath. Rescue    amLODIPine (NORVASC) 10 MG tablet Take 1 tablet (10 mg total) by mouth once daily.    ascorbic acid (VITAMIN C ORAL) Take 1 capsule by mouth Daily.    dicyclomine (BENTYL) 10 MG capsule Take 10 mg by mouth daily as needed.    FLUoxetine 40 MG capsule Take 2 capsules (80 mg total) by mouth once daily.    fluticasone propionate (FLONASE) 50  mcg/actuation nasal spray 1 spray (50 mcg total) by Each Nostril route daily as needed for Allergies.    folic acid (FOLVITE) 1 MG tablet Take 1 tablet (1 mg total) by mouth once daily.    gabapentin (NEURONTIN) 300 MG capsule Take 2-3 capsules (600-900 mg total) by mouth 3 (three) times daily.    naloxone (NARCAN) 4 mg/actuation Spry 4mg by nasal route as needed for opioid overdose; may repeat every 2-3 minutes in alternating nostrils until medical help arrives. Call 911    oxyCODONE-acetaminophen (PERCOCET)  mg per tablet Take 1 tablet by mouth every 6 (six) hours as needed for Pain.    pantoprazole (PROTONIX) 40 MG tablet Take 1 tablet (40 mg total) by mouth once daily.    prazosin (MINIPRESS) 1 MG Cap Take 1 capsule (1 mg total) by mouth every evening.    senna-docusate 8.6-50 mg (PERICOLACE) 8.6-50 mg per tablet Take 1 tablet by mouth daily as needed for Constipation.    tiZANidine (ZANAFLEX) 4 MG tablet Take 1 tablet (4 mg total) by mouth every 8 (eight) hours as needed.    warfarin (COUMADIN) 4 MG tablet Take 1 tablet (4 mg total) by mouth Daily. As directed by the [facility]     Family History       Problem Relation (Age of Onset)    Diabetes Mother    Heart disease Mother, Father          Tobacco Use    Smoking status: Never    Smokeless tobacco: Never   Vaping Use    Vaping status: Never Used   Substance and Sexual Activity    Alcohol use: No    Drug use: Yes     Types: Marijuana     Comment: periodically     Sexual activity: Not Currently     Birth control/protection: See Surgical Hx     Review of Systems   Constitutional:  Negative for chills and fever.   HENT:  Negative for congestion, hearing loss, sinus pressure and trouble swallowing.    Eyes:  Negative for photophobia, pain, redness and visual disturbance.   Respiratory:  Positive for chest tightness and shortness of breath. Negative for cough and wheezing.    Cardiovascular:  Positive for chest pain. Negative for palpitations.    Gastrointestinal:  Negative for abdominal distention, abdominal pain, blood in stool, constipation, diarrhea, nausea and vomiting.   Endocrine: Negative for cold intolerance, heat intolerance, polydipsia, polyphagia and polyuria.   Genitourinary:  Negative for dysuria and frequency.   Musculoskeletal:  Negative for arthralgias, back pain, gait problem, joint swelling, myalgias and neck pain.   Allergic/Immunologic: Negative for environmental allergies, food allergies and immunocompromised state.   Neurological:  Negative for dizziness, seizures, syncope, weakness, light-headedness and headaches.   Hematological:  Negative for adenopathy.   Psychiatric/Behavioral:  Negative for agitation, behavioral problems and confusion.      Objective:     Vital Signs (Most Recent):  Temp: 97.6 °F (36.4 °C) (07/14/25 0624)  Pulse: 99 (07/14/25 0900)  Resp: 18 (07/14/25 0916)  BP: (!) 140/73 (07/14/25 0900)  SpO2: 100 % (07/14/25 0900) Vital Signs (24h Range):  Temp:  [97.6 °F (36.4 °C)] 97.6 °F (36.4 °C)  Pulse:  [] 99  Resp:  [17-20] 18  SpO2:  [99 %-100 %] 100 %  BP: (140-178)/(73-91) 140/73     Weight: 90.7 kg (200 lb)  Body mass index is 36.58 kg/m².     Physical Exam  Constitutional:       General: She is in acute distress.      Appearance: She is obese. She is ill-appearing. She is not diaphoretic.   HENT:      Head: Normocephalic and atraumatic.      Nose: Nose normal.      Mouth/Throat:      Pharynx: No oropharyngeal exudate.   Eyes:      General: No scleral icterus.        Right eye: No discharge.         Left eye: No discharge.      Conjunctiva/sclera: Conjunctivae normal.      Pupils: Pupils are equal, round, and reactive to light.   Neck:      Thyroid: No thyromegaly.      Vascular: No JVD.      Trachea: No tracheal deviation.   Cardiovascular:      Rate and Rhythm: Normal rate and regular rhythm.      Heart sounds: Normal heart sounds. No murmur heard.     No friction rub. No gallop.      Comments: Right chest  wall port  Pulmonary:      Effort: Pulmonary effort is normal. No respiratory distress.      Breath sounds: Normal breath sounds. No stridor. No wheezing or rales.   Chest:      Chest wall: No tenderness.   Abdominal:      General: Bowel sounds are normal. There is no distension.      Palpations: Abdomen is soft. There is no mass.      Tenderness: There is no abdominal tenderness. There is no guarding or rebound.   Musculoskeletal:         General: No tenderness. Normal range of motion.      Cervical back: Normal range of motion and neck supple.   Lymphadenopathy:      Cervical: No cervical adenopathy.   Skin:     General: Skin is warm and dry.      Coloration: Skin is not pale.      Findings: No erythema or rash.   Neurological:      Mental Status: She is alert and oriented to person, place, and time.      Cranial Nerves: No cranial nerve deficit.      Motor: No abnormal muscle tone.      Coordination: Coordination normal.      Deep Tendon Reflexes: Reflexes are normal and symmetric. Reflexes normal.   Psychiatric:         Mood and Affect: Mood is anxious.         Behavior: Behavior normal.         Thought Content: Thought content normal.         Judgment: Judgment normal.              CRANIAL NERVES     CN III, IV, VI   Pupils are equal, round, and reactive to light.       Significant Labs: All pertinent labs within the past 24 hours have been reviewed.    Significant Imaging: I have reviewed all pertinent imaging results/findings within the past 24 hours.

## 2025-07-14 NOTE — H&P
[facility]  History & Physical    Patient Name: Nazanin Malone  MRN: [id number]  Patient Class: IP- Inpatient  Admission Date: 7/14/2025  Attending Physician: Shlomo Aguirre MD          Patient information was obtained from patient, past medical records, and ER records.     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 this morning.  Patient reports  shortness of the breath.  Patient took home pain medication without relief.  She reports her typical sickle cell pain crisis involves pain in her lower extremities.  She denies significant lower extremity pain currently.  She denies any fever, cough, recent sick contacts or travel.  She reports no relief despite receiving intravenous pain medication in the emergency department.    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/2017    Acute venous embolism and thrombosis of internal jugular veins     Asthma     Avascular necrosis of femur     Depression     History of pulmonary embolism     Hypertension     Morbid obesity     Opioid dependence 04/16/2017    Pneumonia due to Streptococcus pneumoniae 04/25/2017    Right lower lobe pneumonia 04/29/2017     Sepsis due to Streptococcus pneumoniae 2017    Sickle cell-beta thalassemia disease with pain     Trigeminal neuralgia        Past Surgical History:   Procedure Laterality Date     SECTION      ESOPHAGOGASTRODUODENOSCOPY N/A 2024    Procedure: EGD (ESOPHAGOGASTRODUODENOSCOPY);  Surgeon: Allen Marshall MD;  Location: [address]);  Service: Gastroenterology;  Laterality: N/A;    TONSILLECTOMY      TUBAL LIGATION         Review of patient's allergies indicates:   Allergen Reactions    Cat dander Anaphylaxis, Itching and Shortness Of Breath    Nsaids (non-steroidal anti-inflammatory drug) Anaphylaxis, Itching and Shortness Of Breath    Latex Rash       No current facility-administered medications on file prior to encounter.     Current Outpatient Medications on File Prior to Encounter   Medication Sig    acetaminophen (TYLENOL) 325 MG tablet Take 2 tablets (650 mg total) by mouth every 8 (eight) hours as needed for Pain.    albuterol (VENTOLIN HFA) 90 mcg/actuation inhaler Inhale 2 puffs into the lungs every 6 (six) hours as needed for Wheezing or Shortness of Breath. Rescue    amLODIPine (NORVASC) 10 MG tablet Take 1 tablet (10 mg total) by mouth once daily.    ascorbic acid (VITAMIN C ORAL) Take 1 capsule by mouth Daily.    dicyclomine (BENTYL) 10 MG capsule Take 10 mg by mouth daily as needed.    FLUoxetine 40 MG capsule Take 2 capsules (80 mg total) by mouth once daily.    fluticasone propionate (FLONASE) 50 mcg/actuation nasal spray 1 spray (50 mcg total) by Each Nostril route daily as needed for Allergies.    folic acid (FOLVITE) 1 MG tablet Take 1 tablet (1 mg total) by mouth once daily.    gabapentin (NEURONTIN) 300 MG capsule Take 2-3 capsules (600-900 mg total) by mouth 3 (three) times daily.    naloxone (NARCAN) 4 mg/actuation Spry 4mg by nasal route as needed for opioid overdose; may repeat every 2-3 minutes in alternating nostrils until medical help arrives. Call 911     oxyCODONE-acetaminophen (PERCOCET)  mg per tablet Take 1 tablet by mouth every 6 (six) hours as needed for Pain.    pantoprazole (PROTONIX) 40 MG tablet Take 1 tablet (40 mg total) by mouth once daily.    prazosin (MINIPRESS) 1 MG Cap Take 1 capsule (1 mg total) by mouth every evening.    senna-docusate 8.6-50 mg (PERICOLACE) 8.6-50 mg per tablet Take 1 tablet by mouth daily as needed for Constipation.    tiZANidine (ZANAFLEX) 4 MG tablet Take 1 tablet (4 mg total) by mouth every 8 (eight) hours as needed.    warfarin (COUMADIN) 4 MG tablet Take 1 tablet (4 mg total) by mouth Daily. As directed by the [facility]     Family History       Problem Relation (Age of Onset)    Diabetes Mother    Heart disease Mother, Father          Tobacco Use    Smoking status: Never    Smokeless tobacco: Never   Vaping Use    Vaping status: Never Used   Substance and Sexual Activity    Alcohol use: No    Drug use: Yes     Types: Marijuana     Comment: periodically     Sexual activity: Not Currently     Birth control/protection: See Surgical Hx     Review of Systems   Constitutional:  Negative for chills and fever.   HENT:  Negative for congestion, hearing loss, sinus pressure and trouble swallowing.    Eyes:  Negative for photophobia, pain, redness and visual disturbance.   Respiratory:  Positive for chest tightness and shortness of breath. Negative for cough and wheezing.    Cardiovascular:  Positive for chest pain. Negative for palpitations.   Gastrointestinal:  Negative for abdominal distention, abdominal pain, blood in stool, constipation, diarrhea, nausea and vomiting.   Endocrine: Negative for cold intolerance, heat intolerance, polydipsia, polyphagia and polyuria.   Genitourinary:  Negative for dysuria and frequency.   Musculoskeletal:  Negative for arthralgias, back pain, gait problem, joint swelling, myalgias and neck pain.   Allergic/Immunologic: Negative for environmental allergies, food allergies and  immunocompromised state.   Neurological:  Negative for dizziness, seizures, syncope, weakness, light-headedness and headaches.   Hematological:  Negative for adenopathy.   Psychiatric/Behavioral:  Negative for agitation, behavioral problems and confusion.      Objective:     Vital Signs (Most Recent):  Temp: 97.6 °F (36.4 °C) (07/14/25 0624)  Pulse: 99 (07/14/25 0900)  Resp: 18 (07/14/25 0916)  BP: (!) 140/73 (07/14/25 0900)  SpO2: 100 % (07/14/25 0900) Vital Signs (24h Range):  Temp:  [97.6 °F (36.4 °C)] 97.6 °F (36.4 °C)  Pulse:  [] 99  Resp:  [17-20] 18  SpO2:  [99 %-100 %] 100 %  BP: (140-178)/(73-91) 140/73     Weight: 90.7 kg (200 lb)  Body mass index is 36.58 kg/m².     Physical Exam  Constitutional:       General: She is in acute distress.      Appearance: She is obese. She is ill-appearing. She is not diaphoretic.   HENT:      Head: Normocephalic and atraumatic.      Nose: Nose normal.      Mouth/Throat:      Pharynx: No oropharyngeal exudate.   Eyes:      General: No scleral icterus.        Right eye: No discharge.         Left eye: No discharge.      Conjunctiva/sclera: Conjunctivae normal.      Pupils: Pupils are equal, round, and reactive to light.   Neck:      Thyroid: No thyromegaly.      Vascular: No JVD.      Trachea: No tracheal deviation.   Cardiovascular:      Rate and Rhythm: Normal rate and regular rhythm.      Heart sounds: Normal heart sounds. No murmur heard.     No friction rub. No gallop.      Comments: Right chest wall port  Pulmonary:      Effort: Pulmonary effort is normal. No respiratory distress.      Breath sounds: Normal breath sounds. No stridor. No wheezing or rales.   Chest:      Chest wall: No tenderness.   Abdominal:      General: Bowel sounds are normal. There is no distension.      Palpations: Abdomen is soft. There is no mass.      Tenderness: There is no abdominal tenderness. There is no guarding or rebound.   Musculoskeletal:         General: No tenderness. Normal  range of motion.      Cervical back: Normal range of motion and neck supple.   Lymphadenopathy:      Cervical: No cervical adenopathy.   Skin:     General: Skin is warm and dry.      Coloration: Skin is not pale.      Findings: No erythema or rash.   Neurological:      Mental Status: She is alert and oriented to person, place, and time.      Cranial Nerves: No cranial nerve deficit.      Motor: No abnormal muscle tone.      Coordination: Coordination normal.      Deep Tendon Reflexes: Reflexes are normal and symmetric. Reflexes normal.   Psychiatric:         Mood and Affect: Mood is anxious.         Behavior: Behavior normal.         Thought Content: Thought content normal.         Judgment: Judgment normal.              CRANIAL NERVES     CN III, IV, VI   Pupils are equal, round, and reactive to light.       Significant Labs: All pertinent labs within the past 24 hours have been reviewed.    Significant Imaging: I have reviewed all pertinent imaging results/findings within the past 24 hours.  Assessment/Plan:     Assessment & Plan  Vaso-occlusive pain due to sickle cell disease  She presents with sudden onset of severe chest pain with pressure.  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  Chart review does reveal that she has had chest pain during prior sickle cell pain crisis.  Chest radiograph clear with normal oxygen saturation speaks against an acute chest syndrome. Reticulocyte count also not elevated speaking against significant hemolysis.  In addition to an atypical presentation for sickle cell pain crisis.  Myocardial ischemia and dissection are importance considerations.  Electrocardiogram shows T-wave inversions in the inferior leads concerning for myocardial ischemia.   Initial troponin negative.  Will treat with aspirin, nitroglycerin as needed, statin therapy, beta-blocker.  Trend  electrocardiograms and troponins.    CT angiogram of chest and abdomen ruled out aortic dissection.  Consult Cardiology for evaluation.  If cardiac workup unrevealing will plan to treat treat with her usual sickle cell pain regimen.  Chronic pain syndrome  Patient with a history of chronic pain for which she takes chronic opioids at home.  Likely with significant opioid tolerance. Will utilize prior pain medication requirements to help guide initial treatment doses of acute pain well best getting underlying cause of her worsening pain.  Hypertension  Patient did not take her morning blood pressure medications.  Restart blood pressure medications now.  Obesity  Body mass index is 36.58 kg/m². Morbid obesity complicates all aspects of disease management from diagnostic modalities to treatment. Weight loss encouraged and health benefits explained to patient.   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  INR 1.9.  Plan to continue with oral anticoagulation pending results of CT angiogram.    VTE Risk Mitigation (From admission, onward)           Ordered     IP VTE HIGH RISK PATIENT  Once         07/14/25 0930     Place JOSE LUIS hose  Until discontinued         07/14/25 0930     Place sequential compression device  Until discontinued         07/14/25 0930     Reason for No Pharmacological VTE Prophylaxis  Once        Question:  Reasons:  Answer:  Already adequately anticoagulated on oral Anticoagulants    07/14/25 0930                    Shlomo Aguirre MD  Department of Hospital Medicine  [facility] - Emergency Dept

## 2025-07-14 NOTE — PLAN OF CARE
Problem: Adult Inpatient Plan of Care  Goal: Plan of Care Review  Outcome: Progressing  Goal: Patient-Specific Goal (Individualized)  Outcome: Progressing  Goal: Absence of Hospital-Acquired Illness or Injury  Outcome: Progressing  Goal: Optimal Comfort and Wellbeing  Outcome: Progressing  Goal: Readiness for Transition of Care  Outcome: Progressing     Problem: Pain Acute  Goal: Optimal Pain Control and Function  Outcome: Progressing     Problem: Sickle Cell Disease  Goal: Optimal Cerebral Tissue Perfusion  Outcome: Progressing  Goal: Optimal Coping with Sickle Cell Disease  Outcome: Progressing  Goal: Effective Tissue Perfusion  Outcome: Progressing  Goal: Absence of Infection Signs and Symptoms  Outcome: Progressing  Goal: Optimal Pain Control and Function  Outcome: Progressing  Goal: Optimal Oxygenation  Outcome: Progressing

## 2025-07-14 NOTE — ED PROVIDER NOTES
Encounter Date: 7/14/2025    SCRIBE #1 NOTE: I, Karine Muhammad, am scribing for, and in the presence of,  Ana Philip MD. I have scribed the following portions of the note - Other sections scribed: HPI, ROS.       History     Chief Complaint   Patient presents with    Chest Pain     Mid sternal chest pain that started around 4am this morning   Pt was woken up out of a dead sleep     Non radiating intermittent mid-sternal chest tightness & feeling short of breath       45 y.o. female with history of ACS, sickle cell disease, thrombosis on Coumadin, hypertension, and asthma who presents with complaint of severe mid-sternal chest pain. Her symptoms woke her out of her sleep this morning. She rates her pain a 10/10 in severity. She has associated shortness of breath, cough productive of clear sputum, nasal congestion, headache, and body aches. She denies fever or sore throat. She reports decreased appetite for the last few days and had one episode of vomiting in the ED secondary to coughing but denies diarrhea. She had a port placed in February. She reports occasional use of marijuana but denies other illicit drug use. She does not smoke cigarettes or drink alcohol. She has a known drug allergy to Nsaids. This is the extent of the patient's complaints at this time.          The history is provided by the patient. No  was used.     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    Acute venous embolism and thrombosis of internal jugular veins     Asthma     Avascular necrosis of femur     Depression     History of pulmonary embolism     Hypertension     Morbid  obesity     Opioid dependence 2017    Pneumonia due to Streptococcus pneumoniae 2017    Right lower lobe pneumonia 2017    Sepsis due to Streptococcus pneumoniae 2017    Sickle cell-beta thalassemia disease with pain     Trigeminal neuralgia      Past Surgical History:   Procedure Laterality Date     SECTION      ESOPHAGOGASTRODUODENOSCOPY N/A 2024    Procedure: EGD (ESOPHAGOGASTRODUODENOSCOPY);  Surgeon: Allen Marshall MD;  Location: [address]);  Service: Gastroenterology;  Laterality: N/A;    TONSILLECTOMY      TUBAL LIGATION       Family History   Problem Relation Name Age of Onset    Heart disease Mother      Diabetes Mother      Heart disease Father      Breast cancer Neg Hx      Ovarian cancer Neg Hx      Colon cancer Neg Hx       Social History[1] denies tobacco use.  Smokes marijuana but no other illicit drug use.  No alcohol use.  Review of Systems   Constitutional:  Negative for chills and fever.   HENT:  Positive for congestion. Negative for sore throat.    Eyes:  Negative for visual disturbance.   Respiratory:  Positive for cough and shortness of breath.    Cardiovascular:  Positive for chest pain. Negative for palpitations.   Gastrointestinal:  Positive for vomiting. Negative for abdominal pain and diarrhea.   Genitourinary:  Negative for decreased urine volume, dysuria and vaginal discharge.   Musculoskeletal:  Positive for myalgias. Negative for joint swelling, neck pain and neck stiffness.   Skin:  Negative for rash and wound.   Neurological:  Positive for headaches. Negative for weakness and numbness.   Psychiatric/Behavioral:  Negative for confusion.        Physical Exam     Initial Vitals [25 0624]   BP Pulse Resp Temp SpO2   (!) 147/91 104 17 97.6 °F (36.4 °C) 99 %      MAP       --         Physical Exam    Nursing note and vitals reviewed.  Constitutional: She appears well-developed and well-nourished. She appears distressed.   HENT:   Head:  Normocephalic and atraumatic. Mouth/Throat: Oropharynx is clear and moist. No oropharyngeal exudate.   Eyes: Conjunctivae and EOM are normal. Pupils are equal, round, and reactive to light.   Neck: Neck supple.   Cardiovascular:  Normal heart sounds.   Tachycardia present.         No murmur heard.  Pulmonary/Chest: No respiratory distress. She has wheezes (Rare, scattered). She has no rhonchi. She has no rales.   Abdominal: Abdomen is soft. There is no abdominal tenderness. There is no rebound and no guarding.   Musculoskeletal:         General: No tenderness or edema.      Cervical back: Neck supple.     Neurological: She is alert and oriented to person, place, and time. She has normal strength. GCS score is 15. GCS eye subscore is 4. GCS verbal subscore is 5. GCS motor subscore is 6.   Skin: Skin is warm and dry. No rash noted.   Psychiatric: She has a normal mood and affect. Thought content normal.         ED Course   Critical Care    Date/Time: 7/14/2025 8:37 AM    Performed by: Ana Philip MD  Authorized by: Ana Phliip MD  Direct patient critical care time: 14 minutes  Additional history critical care time: 5 minutes  Ordering / reviewing critical care time: 6 minutes  Documentation critical care time: 6 minutes  Consulting other physicians critical care time: 6 minutes  Total critical care time (exclusive of procedural time) : 37 minutes  Critical care time was exclusive of separately billable procedures and treating other patients.  Critical care was necessary to treat or prevent imminent or life-threatening deterioration of the following conditions: circulatory failure.  Critical care was time spent personally by me on the following activities: blood draw for specimens, development of treatment plan with patient or surrogate, discussions with consultants, evaluation of patient's response to treatment, examination of patient, obtaining history from patient or surrogate, ordering and performing  treatments and interventions, ordering and review of laboratory studies, ordering and review of radiographic studies, pulse oximetry, re-evaluation of patient's condition and review of old charts.        Labs Reviewed   COMPREHENSIVE METABOLIC PANEL - Abnormal       Result Value    Sodium 140      Potassium 3.2 (*)     Chloride 106      CO2 17 (*)     Glucose 100      BUN 11      Creatinine 1.2      Calcium 10.2      Protein Total 8.5 (*)     Albumin 4.0      Bilirubin Total 1.0            AST 21      ALT 14      Anion Gap 17 (*)     eGFR 56 (*)    PROTIME-INR - Abnormal    PT 20.5 (*)     INR 1.9 (*)    CBC WITH DIFFERENTIAL - Abnormal    WBC 10.05      RBC 4.83      HGB 10.9 (*)     HCT 33.0 (*)     MCV 68 (*)     MCH 22.6 (*)     MCHC 33.0      RDW 25.7 (*)     Platelet Count 552 (*)     MPV 9.3      Nucleated RBC 1 (*)     Neut % 53.4      Lymph % 36.1      Mono % 8.5      Eos % 1.0      Basophil % 0.6      Imm Grans % 0.4      Neut # 5.37      Lymph # 3.63      Mono # 0.85      Eos # 0.10      Baso # 0.06      Imm Grans # 0.04     APTT - Normal    PTT 28.7     TROPONIN I - Normal    Troponin-I <0.006     RETICULOCYTES - Normal    Retic Count, Automated 0.7     LACTIC ACID, PLASMA - Normal    Lactic Acid Level 1.7      Narrative:     Falsely low lactic acid results can be found in samples containing >=13.0 mg/dL total bilirubin and/or >=3.5 mg/dL direct bilirubin.    CULTURE, BLOOD   CULTURE, BLOOD   CBC W/ AUTO DIFFERENTIAL    Narrative:     The following orders were created for panel order CBC auto differential.  Procedure                               Abnormality         Status                     ---------                               -----------         ------                     CBC with Differential[[id number]]       Abnormal            Final result                 Please view results for these tests on the individual orders.   B-TYPE NATRIURETIC PEPTIDE   SARS-COV-2 RDRP GENE    POC Rapid COVID  Negative       Acceptable Yes     POCT INFLUENZA A/B MOLECULAR    POC Molecular Influenza A Ag Negative      POC Molecular Influenza B Ag Negative       Acceptable Yes       EKG Readings: (Independently Interpreted)   Sinus tachycardia rate of 104, no STEMI, no ectopy.  Nonspecific ST abnormality is present.       Imaging Results              X-Ray Chest AP Portable (Final result)  Result time 07/14/25 07:31:18      Final result by Luis Manuel Reed MD (07/14/25 07:31:18)                   Impression:      See above      Electronically signed by: Luis Manuel Reed MD  Date:    07/14/2025  Time:    07:31               Narrative:    EXAMINATION:  XR CHEST AP PORTABLE    CLINICAL HISTORY:  Chest Pain;    TECHNIQUE:  Single frontal view of the chest was performed.    COMPARISON:  06/15/2025    FINDINGS:  Cardiac size is normal.  Vascular catheter seen with its tip in the superior vena cava.  Lungs are well expanded no infiltrate or vascular congestion.                                    X-Rays:   Independently Interpreted Readings:   Chest X-Ray: See ED course under MDM     Medications   sodium chloride 0.9% bolus 1,000 mL 1,000 mL (1,000 mLs Intravenous New Bag 7/14/25 0742)   HYDROmorphone injection 1 mg (1 mg Intravenous Given 7/14/25 0737)   diphenhydrAMINE injection 25 mg (25 mg Intravenous Given 7/14/25 0737)   albuterol-ipratropium 2.5 mg-0.5 mg/3 mL nebulizer solution 3 mL (3 mLs Nebulization Given 7/14/25 0708)   HYDROmorphone (PF) injection 2 mg (2 mg Intravenous Given 7/14/25 0823)     Medical Decision Making  Emergent evaluation a 45-year-old female with history of sickle cell disease, PE anticoagulated on warfarin, previous acute chest syndrome presents with complaint of chest pain that awakened her this morning.  She reports decreased appetite for the last 2 days but all other symptoms started this morning - include cough and congestion.  Rapid COVID and flu testing are  negative.  She did have some faint wheezes on exam, improved after DuoNeb treatment.  X-ray is clear without infiltrate.  Screening troponin is negative and EKG did not show acute ischemia.  Ultimately, chest pain maybe more related to general sickle cell pain.  She does not need an emergency transfusion at this time.  She is admitted to the hospitalist service in serious condition for pain control and further care.    Amount and/or Complexity of Data Reviewed  External Data Reviewed: notes.  Labs: ordered. Decision-making details documented in ED Course.  Radiology: ordered and independent interpretation performed. Decision-making details documented in ED Course.  ECG/medicine tests: ordered and independent interpretation performed. Decision-making details documented in ED Course.    Risk  Prescription drug management.  Decision regarding hospitalization.            Scribe Attestation:   Scribe #1: I performed the above scribed service and the documentation accurately describes the services I performed. I attest to the accuracy of the note.        ED Course as of 07/14/25 0837   Mon Jul 14, 2025   0731 POCT COVID-19 Rapid Screening  Reviewed and negative [AK]   0731 POCT Influenza A/B Molecular  Reviewed and negative [AK]   0731 X-Ray Chest AP Portable  Reviewed and compared to previous.No infiltrates, effusion, or pneumothorax. No acute process. Will defer to the Radiologist's reading.  [AK]   0747 CBC auto differential(!)  No leukocytosis.  Mild microcytic anemia which is slightly improved from her baseline.  There is thrombocytosis. [AK]   0747 Reticulocytes  Reviewed.  Within normal limits but not elevated. [AK]   0758 Protime-INR(!)  Reviewed.  INR slightly subtherapeutic. [AK]   0801 Lactic acid, plasma  Reviewed and within normal limits [AK]   0801 APTT  Reviewed and benign [AK]   0805 Comprehensive metabolic panel(!)  Reviewed.  Mild hypokalemia, mild decreased CO2.  Anion gap is elevated at 17. [AK]   0821  Troponin I  Reviewed and negative [AK]   0823 Sent secure chat to hospitalist regarding admission [AK]      ED Course User Index  [AK] Ana Philip MD           Physician Attestation for Scribe: I, Ana Philip, reviewed documentation as scribed in my presence, which is both accurate and complete.       Medical Decision Making:   Differential Diagnosis:   Includes but not limited to pneumonia, pneumothorax, pleural effusion, cardiac ischemia, heart failure, acute chest syndrome, others                Clinical Impression:  Final diagnoses:  [R07.9] Chest pain (Primary)  [D57.00] Sickle cell disease with crisis  [J06.9] Viral URI with cough          ED Disposition Condition    Admit              [facility]  [facility]  Jul 14 2025 8:37 AM [Ana Philip]  Critical care services have been documented for this encounter. See separate procedure note for details.             [1]   Social History  Tobacco Use    Smoking status: Never    Smokeless tobacco: Never   Vaping Use    Vaping status: Never Used   Substance Use Topics    Alcohol use: No    Drug use: Yes     Types: Marijuana     Comment: periodically         Ana Philip MD  07/14/25 0837

## 2025-07-15 PROBLEM — R07.89 ATYPICAL CHEST PAIN: Status: ACTIVE | Noted: 2025-07-15

## 2025-07-15 PROBLEM — I95.9 HYPOTENSION: Status: ACTIVE | Noted: 2025-07-15

## 2025-07-15 PROBLEM — R78.81 GRAM-POSITIVE COCCI BACTEREMIA: Status: ACTIVE | Noted: 2025-07-15

## 2025-07-15 LAB
ABSOLUTE EOSINOPHIL (OHS): 0.27 K/UL
ABSOLUTE MONOCYTE (OHS): 0.81 K/UL (ref 0.3–1)
ABSOLUTE NEUTROPHIL COUNT (OHS): 2.63 K/UL (ref 1.8–7.7)
ACB COMPLEX DNA BLD POS QL NAA+NON-PROBE: NOT DETECTED
ALBUMIN SERPL BCP-MCNC: 3.2 G/DL (ref 3.5–5.2)
ALP SERPL-CCNC: 92 UNIT/L (ref 40–150)
ALT SERPL W/O P-5'-P-CCNC: 10 UNIT/L (ref 10–44)
ANION GAP (OHS): 11 MMOL/L (ref 8–16)
AST SERPL-CCNC: 21 UNIT/L (ref 11–45)
B FRAGILIS DNA BLD POS QL NAA+PROBE: NOT DETECTED
BASOPHILS # BLD AUTO: 0.06 K/UL
BASOPHILS NFR BLD AUTO: 0.9 %
BILIRUB SERPL-MCNC: 0.6 MG/DL (ref 0.1–1)
BUN SERPL-MCNC: 8 MG/DL (ref 6–20)
C ALBICANS DNA BLD POS QL NAA+PROBE: NOT DETECTED
C AURIS DNA BLD POS QL NAA+NON-PROBE: NOT DETECTED
C GATTII+NEOFOR DNA CSF QL NAA+NON-PROBE: NOT DETECTED
C GLABRATA DNA BLD POS QL NAA+PROBE: NOT DETECTED
C KRUSEI DNA BLD POS QL NAA+PROBE: NOT DETECTED
C PARAP DNA BLD POS QL NAA+PROBE: NOT DETECTED
C TROPICLS DNA BLD POS QL NAA+PROBE: NOT DETECTED
CALCIUM SERPL-MCNC: 9.1 MG/DL (ref 8.7–10.5)
CHLORIDE SERPL-SCNC: 113 MMOL/L (ref 95–110)
CO2 SERPL-SCNC: 19 MMOL/L (ref 23–29)
COLISTIN RES MCR-1 ISLT/SPM QL: ABNORMAL
CREAT SERPL-MCNC: 1.2 MG/DL (ref 0.5–1.4)
E CLOAC COMP DNA BLD POS QL NAA+PROBE: NOT DETECTED
E COLI DNA BLD POS QL NAA+PROBE: NOT DETECTED
E FAECALIS+OTHR E SP RRNA BLD POS FISH: NOT DETECTED
E FAECIUM HSP60 BLD POS QL PROBE: NOT DETECTED
ENTEROBACTERALES DNA BLD POS NAA+N-PRB: NOT DETECTED
ERYTHROCYTE [DISTWIDTH] IN BLOOD BY AUTOMATED COUNT: 25.1 % (ref 11.5–14.5)
ESBL CFT TO CFT-CLAV IC RTO BD POS IMP: ABNORMAL
GFR SERPLBLD CREATININE-BSD FMLA CKD-EPI: 56 ML/MIN/1.73/M2
GLUCOSE SERPL-MCNC: 84 MG/DL (ref 70–110)
GP B STREP DNA CSF QL NAA+NON-PROBE: NOT DETECTED
HAEM INFLU DNA CSF QL NAA+NON-PROBE: NOT DETECTED
HCT VFR BLD AUTO: 29 % (ref 37–48.5)
HGB BLD-MCNC: 9.4 GM/DL (ref 12–16)
IMM GRANULOCYTES # BLD AUTO: 0.01 K/UL (ref 0–0.04)
IMM GRANULOCYTES NFR BLD AUTO: 0.1 % (ref 0–0.5)
IMP CARBAPENEMASE ISLT QL IA.RAPID: ABNORMAL
INR PPP: 2.4 (ref 0.8–1.2)
K OXYTOCA OMPA BLD POS QL PROBE: NOT DETECTED
KLEBSIELLA SP DNA BLD POS QL NAA+NON-PRB: NOT DETECTED
KLEBSIELLA SP DNA BLD POS QL NAA+NON-PRB: NOT DETECTED
KPC CARBAPENEMASE ISLT QL IA.RAPID: ABNORMAL
L MONOCYTOG DNA CSF QL NAA+NON-PROBE: NOT DETECTED
LACTATE SERPL-SCNC: 1 MMOL/L (ref 0.5–2.2)
LYMPHOCYTES # BLD AUTO: 3.03 K/UL (ref 1–4.8)
MAGNESIUM SERPL-MCNC: 1.8 MG/DL (ref 1.6–2.6)
MCH RBC QN AUTO: 22.9 PG (ref 27–31)
MCHC RBC AUTO-ENTMCNC: 32.4 G/DL (ref 32–36)
MCV RBC AUTO: 71 FL (ref 82–98)
MECA+MECC NOSE QL NAA+PROBE: DETECTED
MECA+MECC+MREJ ISLT/SPM QL: ABNORMAL
N MEN DNA CSF QL NAA+NON-PROBE: NOT DETECTED
NDM CARBAPENEMASE ISLT QL IA.RAPID: ABNORMAL
NUCLEATED RBC (/100WBC) (OHS): 1 /100 WBC
OXA-48-LIKE CRBPNASE ISLT QL IA.RAPID: ABNORMAL
P AERUGINOSA DNA BLD POS QL NAA+PROBE: NOT DETECTED
PHOSPHATE SERPL-MCNC: 3.8 MG/DL (ref 2.7–4.5)
PLATELET # BLD AUTO: 451 K/UL (ref 150–450)
PMV BLD AUTO: 9.3 FL (ref 9.2–12.9)
POTASSIUM SERPL-SCNC: 4.1 MMOL/L (ref 3.5–5.1)
PROT SERPL-MCNC: 6.8 GM/DL (ref 6–8.4)
PROTEUS SP DNA BLD POS QL NAA+PROBE: NOT DETECTED
PROTHROMBIN TIME: 25.6 SECONDS (ref 9–12.5)
RBC # BLD AUTO: 4.11 M/UL (ref 4–5.4)
RELATIVE EOSINOPHIL (OHS): 4 %
RELATIVE LYMPHOCYTE (OHS): 44.5 % (ref 18–48)
RELATIVE MONOCYTE (OHS): 11.9 % (ref 4–15)
RELATIVE NEUTROPHIL (OHS): 38.6 % (ref 38–73)
S AUREUS DNA BLD POS QL NAA+PROBE: NOT DETECTED
S ENT+BONG DNA STL QL NAA+NON-PROBE: NOT DETECTED
S EPIDERMIDIS HSP60 BLD POS QL PROBE: DETECTED
S LUGDUNENSIS SODA BLD POS QL PROBE: NOT DETECTED
S MALTOPH DNA BLD POS QL NAA+PROBE: NOT DETECTED
S MARCESCENS DNA BLD POS QL NAA+PROBE: NOT DETECTED
S PNEUM DNA CSF QL NAA+NON-PROBE: NOT DETECTED
S PYOGENES HSP60 BLD POS QL PROBE: NOT DETECTED
SODIUM SERPL-SCNC: 143 MMOL/L (ref 136–145)
STAPH SP TUF BLD POS QL PROBE: ABNORMAL
STREPTOCOCCUS SP TUF BLD POS QL PROBE: NOT DETECTED
TROPONIN I SERPL DL<=0.01 NG/ML-MCNC: <0.006 NG/ML
VAN(A+B+C1+C2) GENES ISLT/SPM: ABNORMAL
VIM CARBAPENEMASE ISLT QL IA.RAPID: ABNORMAL
WBC # BLD AUTO: 6.81 K/UL (ref 3.9–12.7)

## 2025-07-15 PROCEDURE — 25000003 PHARM REV CODE 250: Performed by: HOSPITALIST

## 2025-07-15 PROCEDURE — 84100 ASSAY OF PHOSPHORUS: CPT | Performed by: HOSPITALIST

## 2025-07-15 PROCEDURE — 85025 COMPLETE CBC W/AUTO DIFF WBC: CPT | Performed by: HOSPITALIST

## 2025-07-15 PROCEDURE — 36415 COLL VENOUS BLD VENIPUNCTURE: CPT | Performed by: HOSPITALIST

## 2025-07-15 PROCEDURE — 84484 ASSAY OF TROPONIN QUANT: CPT | Performed by: HOSPITALIST

## 2025-07-15 PROCEDURE — 85610 PROTHROMBIN TIME: CPT | Performed by: HOSPITALIST

## 2025-07-15 PROCEDURE — 94761 N-INVAS EAR/PLS OXIMETRY MLT: CPT

## 2025-07-15 PROCEDURE — 80053 COMPREHEN METABOLIC PANEL: CPT | Performed by: HOSPITALIST

## 2025-07-15 PROCEDURE — 63600175 PHARM REV CODE 636 W HCPCS: Performed by: HOSPITALIST

## 2025-07-15 PROCEDURE — 11000001 HC ACUTE MED/SURG PRIVATE ROOM

## 2025-07-15 PROCEDURE — 83605 ASSAY OF LACTIC ACID: CPT | Performed by: HOSPITALIST

## 2025-07-15 PROCEDURE — 83735 ASSAY OF MAGNESIUM: CPT | Performed by: HOSPITALIST

## 2025-07-15 RX ORDER — HYDROMORPHONE HYDROCHLORIDE 2 MG/ML
2 INJECTION, SOLUTION INTRAMUSCULAR; INTRAVENOUS; SUBCUTANEOUS
Status: DISCONTINUED | OUTPATIENT
Start: 2025-07-15 | End: 2025-07-15

## 2025-07-15 RX ORDER — MUPIROCIN 20 MG/G
OINTMENT TOPICAL 2 TIMES DAILY
Status: DISCONTINUED | OUTPATIENT
Start: 2025-07-15 | End: 2025-07-18 | Stop reason: HOSPADM

## 2025-07-15 RX ORDER — HYDROMORPHONE HYDROCHLORIDE 1 MG/ML
1 INJECTION, SOLUTION INTRAMUSCULAR; INTRAVENOUS; SUBCUTANEOUS
Status: DISCONTINUED | OUTPATIENT
Start: 2025-07-15 | End: 2025-07-17

## 2025-07-15 RX ORDER — OXYCODONE AND ACETAMINOPHEN 10; 325 MG/1; MG/1
1 TABLET ORAL EVERY 4 HOURS PRN
Refills: 0 | Status: DISCONTINUED | OUTPATIENT
Start: 2025-07-15 | End: 2025-07-18 | Stop reason: HOSPADM

## 2025-07-15 RX ORDER — PANTOPRAZOLE SODIUM 40 MG/1
40 TABLET, DELAYED RELEASE ORAL 2 TIMES DAILY
Status: DISCONTINUED | OUTPATIENT
Start: 2025-07-15 | End: 2025-07-18 | Stop reason: HOSPADM

## 2025-07-15 RX ORDER — DICYCLOMINE HYDROCHLORIDE 10 MG/1
10 CAPSULE ORAL 4 TIMES DAILY
Status: DISCONTINUED | OUTPATIENT
Start: 2025-07-15 | End: 2025-07-18 | Stop reason: HOSPADM

## 2025-07-15 RX ORDER — SUCRALFATE 1 G/10ML
1 SUSPENSION ORAL
Status: DISCONTINUED | OUTPATIENT
Start: 2025-07-15 | End: 2025-07-18 | Stop reason: HOSPADM

## 2025-07-15 RX ADMIN — OXYCODONE AND ACETAMINOPHEN 1 TABLET: 325; 10 TABLET ORAL at 07:07

## 2025-07-15 RX ADMIN — HYDROMORPHONE HYDROCHLORIDE 2 MG: 2 INJECTION INTRAMUSCULAR; INTRAVENOUS; SUBCUTANEOUS at 02:07

## 2025-07-15 RX ADMIN — PANTOPRAZOLE SODIUM 40 MG: 40 TABLET, DELAYED RELEASE ORAL at 09:07

## 2025-07-15 RX ADMIN — DICYCLOMINE HYDROCHLORIDE 10 MG: 10 CAPSULE ORAL at 04:07

## 2025-07-15 RX ADMIN — HYDROMORPHONE HYDROCHLORIDE 1 MG: 1 INJECTION, SOLUTION INTRAMUSCULAR; INTRAVENOUS; SUBCUTANEOUS at 05:07

## 2025-07-15 RX ADMIN — WARFARIN SODIUM 4 MG: 2 TABLET ORAL at 05:07

## 2025-07-15 RX ADMIN — SUCRALFATE ORAL 1 G: 1 SUSPENSION ORAL at 08:07

## 2025-07-15 RX ADMIN — MUPIROCIN: 20 OINTMENT TOPICAL at 09:07

## 2025-07-15 RX ADMIN — ACETAMINOPHEN 1000 MG: 500 TABLET, FILM COATED ORAL at 09:07

## 2025-07-15 RX ADMIN — ASPIRIN 325 MG: 325 TABLET ORAL at 08:07

## 2025-07-15 RX ADMIN — DICYCLOMINE HYDROCHLORIDE 10 MG: 10 CAPSULE ORAL at 08:07

## 2025-07-15 RX ADMIN — SUCRALFATE ORAL 1 G: 1 SUSPENSION ORAL at 04:07

## 2025-07-15 RX ADMIN — FLUOXETINE HYDROCHLORIDE 80 MG: 20 CAPSULE ORAL at 08:07

## 2025-07-15 RX ADMIN — HYDROMORPHONE HYDROCHLORIDE 2 MG: 2 INJECTION INTRAMUSCULAR; INTRAVENOUS; SUBCUTANEOUS at 09:07

## 2025-07-15 RX ADMIN — HYDROMORPHONE HYDROCHLORIDE 2 MG: 2 INJECTION INTRAMUSCULAR; INTRAVENOUS; SUBCUTANEOUS at 07:07

## 2025-07-15 RX ADMIN — GABAPENTIN 600 MG: 300 CAPSULE ORAL at 04:07

## 2025-07-15 RX ADMIN — DIPHENHYDRAMINE HYDROCHLORIDE 50 MG: 50 INJECTION INTRAMUSCULAR; INTRAVENOUS at 07:07

## 2025-07-15 RX ADMIN — MORPHINE SULFATE 30 MG: 30 TABLET, FILM COATED, EXTENDED RELEASE ORAL at 08:07

## 2025-07-15 RX ADMIN — MUPIROCIN: 20 OINTMENT TOPICAL at 08:07

## 2025-07-15 RX ADMIN — DIPHENHYDRAMINE HYDROCHLORIDE 50 MG: 50 INJECTION INTRAMUSCULAR; INTRAVENOUS at 02:07

## 2025-07-15 RX ADMIN — GABAPENTIN 600 MG: 300 CAPSULE ORAL at 09:07

## 2025-07-15 RX ADMIN — SUCRALFATE ORAL 1 G: 1 SUSPENSION ORAL at 11:07

## 2025-07-15 RX ADMIN — SODIUM CHLORIDE: 9 INJECTION, SOLUTION INTRAVENOUS at 07:07

## 2025-07-15 RX ADMIN — OXYCODONE AND ACETAMINOPHEN 1 TABLET: 325; 10 TABLET ORAL at 11:07

## 2025-07-15 RX ADMIN — METOPROLOL TARTRATE 25 MG: 25 TABLET, FILM COATED ORAL at 08:07

## 2025-07-15 RX ADMIN — TIZANIDINE 4 MG: 4 TABLET ORAL at 08:07

## 2025-07-15 RX ADMIN — DIPHENHYDRAMINE HYDROCHLORIDE 50 MG: 50 INJECTION INTRAMUSCULAR; INTRAVENOUS at 11:07

## 2025-07-15 RX ADMIN — ACETAMINOPHEN 1000 MG: 500 TABLET, FILM COATED ORAL at 07:07

## 2025-07-15 RX ADMIN — MORPHINE SULFATE 30 MG: 30 TABLET, FILM COATED, EXTENDED RELEASE ORAL at 09:07

## 2025-07-15 RX ADMIN — DICYCLOMINE HYDROCHLORIDE 10 MG: 10 CAPSULE ORAL at 09:07

## 2025-07-15 RX ADMIN — OXYCODONE AND ACETAMINOPHEN 1 TABLET: 325; 10 TABLET ORAL at 04:07

## 2025-07-15 RX ADMIN — PANTOPRAZOLE SODIUM 40 MG: 40 TABLET, DELAYED RELEASE ORAL at 08:07

## 2025-07-15 RX ADMIN — SODIUM CHLORIDE 1000 ML: 9 INJECTION, SOLUTION INTRAVENOUS at 12:07

## 2025-07-15 RX ADMIN — DIPHENHYDRAMINE HYDROCHLORIDE 50 MG: 50 INJECTION INTRAMUSCULAR; INTRAVENOUS at 05:07

## 2025-07-15 RX ADMIN — FOLIC ACID 1 MG: 1 TABLET ORAL at 08:07

## 2025-07-15 RX ADMIN — GABAPENTIN 600 MG: 300 CAPSULE ORAL at 08:07

## 2025-07-15 RX ADMIN — HYDROMORPHONE HYDROCHLORIDE 1 MG: 1 INJECTION, SOLUTION INTRAMUSCULAR; INTRAVENOUS; SUBCUTANEOUS at 09:07

## 2025-07-15 NOTE — ASSESSMENT & PLAN NOTE
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    -INR 2.4 today  -Continue warfarin and daily pt/INR

## 2025-07-15 NOTE — SUBJECTIVE & OBJECTIVE
Interval History: No acute events overnight.  Feeling much better today.  Still with some intermittent upper abdomen and mid sternal pain.  All questions answered and patient had no further complaints.    Objective:     Vital Signs (Most Recent):  Temp: 98.1 °F (36.7 °C) (07/15/25 1137)  Pulse: 65 (07/15/25 1137)  Resp: 18 (07/15/25 1137)  BP: (!) 85/54 (07/15/25 1152)  SpO2: 96 % (07/15/25 1137) Vital Signs (24h Range):  Temp:  [98 °F (36.7 °C)-99.1 °F (37.3 °C)] 98.1 °F (36.7 °C)  Pulse:  [61-86] 65  Resp:  [18-20] 18  SpO2:  [95 %-98 %] 96 %  BP: ()/(53-86) 85/54     Weight: 90.7 kg (199 lb 15.3 oz)  Body mass index is 36.57 kg/m².    Intake/Output Summary (Last 24 hours) at 7/15/2025 1220  Last data filed at 7/15/2025 1000  Gross per 24 hour   Intake 2239.81 ml   Output 0 ml   Net 2239.81 ml         Physical Exam  Constitutional:       General: She is not in acute distress.     Appearance: She is obese. She is not ill-appearing or diaphoretic.   HENT:      Head: Normocephalic and atraumatic.      Nose: Nose normal.      Mouth/Throat:      Mouth: Mucous membranes are moist.   Eyes:      Extraocular Movements: Extraocular movements intact.   Neck:      Thyroid: No thyromegaly.      Vascular: No JVD.      Trachea: No tracheal deviation.   Cardiovascular:      Rate and Rhythm: Normal rate and regular rhythm.      Heart sounds: Normal heart sounds. No murmur heard.     No friction rub. No gallop.      Comments: Right chest wall port  Pulmonary:      Effort: Pulmonary effort is normal. No respiratory distress.      Breath sounds: Normal breath sounds. No stridor. No wheezing or rales.   Chest:      Chest wall: No tenderness.   Abdominal:      General: Bowel sounds are normal. There is no distension.      Palpations: Abdomen is soft. There is no mass.      Tenderness: There is no abdominal tenderness. There is no guarding or rebound.   Musculoskeletal:         General: No tenderness. Normal range of motion.       Cervical back: Normal range of motion and neck supple.   Skin:     General: Skin is warm and dry.      Coloration: Skin is not pale.      Findings: No erythema or rash.   Neurological:      Mental Status: She is alert and oriented to person, place, and time.      Cranial Nerves: No cranial nerve deficit.      Motor: No abnormal muscle tone.      Coordination: Coordination normal.      Deep Tendon Reflexes: Reflexes are normal and symmetric. Reflexes normal.   Psychiatric:         Mood and Affect: Mood is anxious.         Behavior: Behavior normal.         Thought Content: Thought content normal.         Judgment: Judgment normal.               Significant Labs: All pertinent labs within the past 24 hours have been reviewed.    Significant Imaging: I have reviewed all pertinent imaging results/findings within the past 24 hours.

## 2025-07-15 NOTE — ASSESSMENT & PLAN NOTE
Body mass index is 36.57 kg/m². Morbid obesity complicates all aspects of disease management from diagnostic modalities to treatment. Weight loss encouraged and health benefits explained to patient.

## 2025-07-15 NOTE — ASSESSMENT & PLAN NOTE
-BP mildly elevated on admit and now moderately low  -Place hold parameters on metoprolol and IV dilaudid  -Bolus 1 L NS  -Monitor closely.

## 2025-07-15 NOTE — PLAN OF CARE
Problem: Adult Inpatient Plan of Care  Goal: Plan of Care Review  Outcome: Progressing  Goal: Patient-Specific Goal (Individualized)  Outcome: Progressing  Goal: Absence of Hospital-Acquired Illness or Injury  Outcome: Progressing  Goal: Optimal Comfort and Wellbeing  Outcome: Progressing  Goal: Readiness for Transition of Care  Outcome: Progressing     Problem: Infection  Goal: Absence of Infection Signs and Symptoms  Outcome: Progressing     Problem: Pain Acute  Goal: Optimal Pain Control and Function  Outcome: Progressing     Problem: Sickle Cell Disease  Goal: Optimal Cerebral Tissue Perfusion  Outcome: Progressing  Goal: Optimal Coping with Sickle Cell Disease  Outcome: Progressing  Goal: Effective Tissue Perfusion  Outcome: Progressing  Goal: Absence of Infection Signs and Symptoms  Outcome: Progressing  Goal: Optimal Pain Control and Function  Outcome: Progressing  Goal: Optimal Oxygenation  Outcome: Progressing     Problem: Fall Injury Risk  Goal: Absence of Fall and Fall-Related Injury  Outcome: Progressing

## 2025-07-15 NOTE — PROGRESS NOTES
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 this morning.  Patient reports  shortness of the breath.  Patient took home pain medication without relief.  She reports her typical sickle cell pain crisis involves pain in her lower extremities.  She denies significant lower extremity pain currently.  She denies any fever, cough, recent sick contacts or travel.  She reports no relief despite receiving intravenous pain medication in the emergency department.    Overview/Hospital Course:  No notes on file    Interval History: No acute events overnight.  Feeling much better today.  Still with some intermittent upper abdomen and mid sternal pain.  All questions answered and patient had no further complaints.    Objective:     Vital Signs (Most Recent):  Temp: 98.1 °F (36.7 °C) (07/15/25 1137)  Pulse: 65 (07/15/25 1137)  Resp: 18 (07/15/25 1137)  BP: (!) 85/54 (07/15/25 1152)  SpO2: 96 % (07/15/25 1137) Vital Signs (24h Range):  Temp:  [98 °F (36.7 °C)-99.1 °F (37.3 °C)] 98.1 °F (36.7 °C)  Pulse:  [61-86] 65  Resp:  [18-20] 18  SpO2:  [95 %-98 %] 96 %  BP: ()/(53-86) 85/54     Weight: 90.7 kg (199 lb 15.3 oz)  Body mass index is 36.57 kg/m².    Intake/Output Summary (Last 24 hours) at 7/15/2025 1220  Last  data filed at 7/15/2025 1000  Gross per 24 hour   Intake 2239.81 ml   Output 0 ml   Net 2239.81 ml         Physical Exam  Constitutional:       General: She is not in acute distress.     Appearance: She is obese. She is not ill-appearing or diaphoretic.   HENT:      Head: Normocephalic and atraumatic.      Nose: Nose normal.      Mouth/Throat:      Mouth: Mucous membranes are moist.   Eyes:      Extraocular Movements: Extraocular movements intact.   Neck:      Thyroid: No thyromegaly.      Vascular: No JVD.      Trachea: No tracheal deviation.   Cardiovascular:      Rate and Rhythm: Normal rate and regular rhythm.      Heart sounds: Normal heart sounds. No murmur heard.     No friction rub. No gallop.      Comments: Right chest wall port  Pulmonary:      Effort: Pulmonary effort is normal. No respiratory distress.      Breath sounds: Normal breath sounds. No stridor. No wheezing or rales.   Chest:      Chest wall: No tenderness.   Abdominal:      General: Bowel sounds are normal. There is no distension.      Palpations: Abdomen is soft. There is no mass.      Tenderness: There is no abdominal tenderness. There is no guarding or rebound.   Musculoskeletal:         General: No tenderness. Normal range of motion.      Cervical back: Normal range of motion and neck supple.   Skin:     General: Skin is warm and dry.      Coloration: Skin is not pale.      Findings: No erythema or rash.   Neurological:      Mental Status: She is alert and oriented to person, place, and time.      Cranial Nerves: No cranial nerve deficit.      Motor: No abnormal muscle tone.      Coordination: Coordination normal.      Deep Tendon Reflexes: Reflexes are normal and symmetric. Reflexes normal.   Psychiatric:         Mood and Affect: Mood is anxious.         Behavior: Behavior normal.         Thought Content: Thought content normal.         Judgment: Judgment normal.               Significant Labs: All pertinent labs within the past 24 hours  have been reviewed.    Significant Imaging: I have reviewed all pertinent imaging results/findings within the past 24 hours.      Assessment & Plan  Vaso-occlusive pain due to sickle cell disease  Atypical chest pain  Chronic gastritis  Chronic pain syndrome  -Admitted to inpatient status  -Presented for evaluation of sudden onset of severe heavy pressure chest pain which woke her from sleep.  Pain is not typical of prior sickle cell pain crisis which she states usually involves lower extremity pain which she denies at this time.  Chart review does reveal that she has had chest pain during prior sickle cell pain crisis.    -CXR is clear with normal oxygen saturations which  speaks against an acute chest syndrome. -Reticulocyte count is normal.  Hb at baseline. T bili normal  -CTA chest ruled out PE and dissection  -Troponins negative.  Echocardiogram showed normal EF, grade 1 diastolic dysfunction and segmental wall motion abnormalities.  Cardiology consulted and input appreciated- this is unlikely to be cardiac in nature  -Noted history of chronic gastritis.  Reviewed EGD from 11/2024 showing gastritis and duodenitis with biopsies negative for malignancy and H.pylori  -Suspect her pain is secondary to gastritis / esophagitis and possible esophageal spasm - all in the context of likely opioid receptor hyperalgesia due to chronic need for opioid pain medications  -Hb today is 9.4 and T bili remains normal.  After my visit I was notified that her blood pressure is low and she is somnolent, but no other complaints.  Also noted Blood cultures positive for gram positive cocci.  -Bolus 1 L NS and check lactic acid now.  -Continue folic acid and basal IV fluids.  Resume home percocet.  Change iv dilaudid to lower dose and for breakthrough pain only.  Place hold parameters on iv dilaudid.  -Start bid PPI, carafate QID and dicyclomine qid  -Repeat labs in AM  Gram-positive cocci bacteremia  -Unclear why blood cultures were  collected on admit, but they are positive for GPC.  No clear source identified - unsure if this is actual bacteremia or secondary to skin contamination.  -Await speciation  -Low threshold to start antibiotics.  Hypertension  Hypotension  -BP mildly elevated on admit and now moderately low  -Place hold parameters on metoprolol and IV dilaudid  -Bolus 1 L NS  -Monitor closely.  Obesity  Body mass index is 36.57 kg/m². Morbid obesity complicates all aspects of disease management from diagnostic modalities to treatment. Weight loss encouraged and health benefits explained to patient.  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    -INR 2.4 today  -Continue warfarin and daily pt/INR  VTE Risk Mitigation (From admission, onward)           Ordered     warfarin (COUMADIN) tablet 4 mg  Daily         07/14/25 0941     IP VTE HIGH RISK PATIENT  Once         07/14/25 0941     Place JOSE LUIS hose  Until discontinued         07/14/25 0930     Place sequential compression device  Until discontinued         07/14/25 0930     Reason for No Pharmacological VTE Prophylaxis  Once        Question:  Reasons:  Answer:  Already adequately anticoagulated on oral Anticoagulants    07/14/25 0930                    Discharge Planning   ALYSE:      Code Status: Full Code   Medical Readiness for Discharge Date:   Discharge Plan A: Home with family                        Keyur Hernandez MD  Department of Hospital Medicine   [facility] ([place])

## 2025-07-15 NOTE — NURSING
Walked into patients room because her bolus of fluids was complete, patient asleep in the bed, snoring, with IV pump going off and phone laying on bed at a loud volume and patient continued to sleep through it, Pt breathing normally just very sedated. Dr. Hernandez notified of patients state, holding medications until patient becomes more alert.

## 2025-07-15 NOTE — PROGRESS NOTES
07/15/25 1137   Vital Signs   Temp 98.1 °F (36.7 °C)   Temp Source Oral   Pulse 65   Heart Rate Source Monitor   Resp 18   SpO2 96 %   BP (!) 93/53   MAP (mmHg) 67   BP Location Left arm   Patient Position Lying     Patients BP is moderately low, trending in the 80s/50s, with MAP remaining above 65. Patient observed resting in bed, drowsy but arousable.  Per Dr. Baer Marker, pain medication is to be held until hypotension resolves.  Verbal order obtained for 1L Normal Saline IV bolus and Lactic Acid.  Patient is being monitored closely. Blood pressure reassessment is ongoing.

## 2025-07-15 NOTE — PLAN OF CARE
Problem: Adult Inpatient Plan of Care  Goal: Patient-Specific Goal (Individualized)  Outcome: Progressing     Problem: Adult Inpatient Plan of Care  Goal: Absence of Hospital-Acquired Illness or Injury  Outcome: Progressing     Problem: Adult Inpatient Plan of Care  Goal: Optimal Comfort and Wellbeing  Outcome: Progressing     Problem: Infection  Goal: Absence of Infection Signs and Symptoms  Outcome: Progressing     Problem: Pain Acute  Goal: Optimal Pain Control and Function  Outcome: Progressing

## 2025-07-15 NOTE — ASSESSMENT & PLAN NOTE
-Admitted to inpatient status  -Presented for evaluation of sudden onset of severe heavy pressure chest pain which woke her from sleep.  Pain is not typical of prior sickle cell pain crisis which she states usually involves lower extremity pain which she denies at this time.  Chart review does reveal that she has had chest pain during prior sickle cell pain crisis.    -CXR is clear with normal oxygen saturations which  speaks against an acute chest syndrome. -Reticulocyte count is normal.  Hb at baseline. T bili normal  -CTA chest ruled out PE and dissection  -Troponins negative.  Echocardiogram showed normal EF, grade 1 diastolic dysfunction and segmental wall motion abnormalities.  Cardiology consulted and input appreciated- this is unlikely to be cardiac in nature  -Noted history of chronic gastritis.  Reviewed EGD from 11/2024 showing gastritis and duodenitis with biopsies negative for malignancy and H.pylori  -Suspect her pain is secondary to gastritis / esophagitis and possible esophageal spasm - all in the context of likely opioid receptor hyperalgesia due to chronic need for opioid pain medications  -Hb today is 9.4 and T bili remains normal.  After my visit I was notified that her blood pressure is low and she is somnolent, but no other complaints.  Also noted Blood cultures positive for gram positive cocci.  -Bolus 1 L NS and check lactic acid now.  -Continue folic acid and basal IV fluids.  Resume home percocet.  Change iv dilaudid to lower dose and for breakthrough pain only.  Place hold parameters on iv dilaudid.  -Start bid PPI, carafate QID and dicyclomine qid  -Repeat labs in AM

## 2025-07-15 NOTE — PLAN OF CARE
Case Management Assessment     PCP: to establish at discharge  Pharmacy: at bedside    Patient Arrived From: home  Existing Help at Home: adult children     Barriers to Discharge: none    Discharge Plan:    A. Home with family   B. Home with family      Patient is AAOx3 and independent at baseline. Patient denies owning DME and completes ADLs independently. Patient ubers and lyfts to appointments. Patient is taking coumadin and stated someone comes to her house on Wednesday to take her labs. CM will continue to follow. Patient will transportation home.     [facility] ([place])  Initial Discharge Assessment       Primary Care Provider: Kezia, Primary Doctor    Admission Diagnosis: Sickle cell disease with crisis [D57.00]  Chest pain [R07.9]  Viral URI with cough [J06.9]    Admission Date: 7/14/2025  Expected Discharge Date: 7/16/2025    Transition of Care Barriers: None    Payor: [employer] MANAGED MEDICARE / Plan: [employer] MEDICARE PLAN HMO / Product Type: Medicare Advantage /     Extended Emergency Contact Information  Primary Emergency Contact: RizwanamariamTimoteo reno  Mobile Phone: [phone]  Relation: Daughter    Discharge Plan A: Home with family  Discharge Plan B: Home with family      [facility] [address] [address]  Phone: [phone] Fax: [phone]      Initial Assessment (most recent)       Adult Discharge Assessment - 07/15/25 1100          Discharge Assessment    Assessment Type Discharge Planning Assessment     Confirmed/corrected address, phone number and insurance Yes     Confirmed Demographics Correct on Facesheet     Source of Information patient     Communicated ALYSE with patient/caregiver Date not available/Unable to determine     People in Home grandchild(jayce);child(jayce), adult;parent(s)     Name(s) of People in Home Timoteo Malone (Daughter)  [phone] (Mobile)     Do you expect to return to your current living situation? Yes     Do you have help at home or  "someone to help you manage your care at home? No     Prior to hospitilization cognitive status: Alert/Oriented     Current cognitive status: Alert/Oriented     Walking or Climbing Stairs Difficulty no     Dressing/Bathing Difficulty no     Equipment Currently Used at Home none     Readmission within 30 days? Yes     Do you currently have service(s) that help you manage your care at home? No     Do you take prescription medications? Yes     Do you have prescription coverage? Yes     Do you have any problems affording any of your prescribed medications? Yes     If yes, what medications? eloquis     Is the patient taking medications as prescribed? yes     Who is going to help you get home at discharge? patient will need transport home     How do you get to doctors appointments? other (see comments)   lyft or uber    Are you on dialysis? No     Do you take coumadin? Yes     Who monitors your labs? patient stated "someone comes out on wednesday to take my labs"     Discharge Plan A Home with family     Discharge Plan B Home with family     DME Needed Upon Discharge  none     Discharge Plan discussed with: Patient     Transition of Care Barriers None                                   "

## 2025-07-15 NOTE — NURSING
Patient reassessed for pain after prn administration of Oxycodone.Pt reported her pain remains at 10/10. Patient requested to take Benadryl with Dilaudid and asked for an increase in her Dilaudid dose. She was informed that her blood pressure was low this morning. The patient received Dilaudid for pain and Tizanidine for muscle spasms, after which her BP dropped to the 80s/50s and she appeared drowsy and difficult to arouse. A 1 Liter normal saline bolus was administered per provider order. Patient stated that she normally sleeps like this referring to her drowsy state. Dr. Keyur Hernandez MD, was notified of the patient's requests. Per Dr. Hernandez, Dilaudid dosage will not be increased at this time due to prior somnolence and low BP.  The Benadryl may be given with the current Dilaudid dose. Patient was informed and verbalized understanding. Dilaudid and Benadryl was administered for breakthrough pain and itching per order.

## 2025-07-15 NOTE — ASSESSMENT & PLAN NOTE
-Unclear why blood cultures were collected on admit, but they are positive for GPC.  No clear source identified - unsure if this is actual bacteremia or secondary to skin contamination.  -Await speciation  -Low threshold to start antibiotics.

## 2025-07-16 LAB
ABSOLUTE EOSINOPHIL (OHS): 0.31 K/UL
ABSOLUTE MONOCYTE (OHS): 0.93 K/UL (ref 0.3–1)
ABSOLUTE NEUTROPHIL COUNT (OHS): 5.12 K/UL (ref 1.8–7.7)
ALBUMIN SERPL BCP-MCNC: 3.3 G/DL (ref 3.5–5.2)
ALP SERPL-CCNC: 88 UNIT/L (ref 40–150)
ALT SERPL W/O P-5'-P-CCNC: 10 UNIT/L (ref 10–44)
ANION GAP (OHS): 6 MMOL/L (ref 8–16)
AST SERPL-CCNC: 20 UNIT/L (ref 11–45)
BACTERIA #/AREA URNS AUTO: ABNORMAL /HPF
BASOPHILS # BLD AUTO: 0.05 K/UL
BASOPHILS NFR BLD AUTO: 0.5 %
BILIRUB SERPL-MCNC: 0.2 MG/DL (ref 0.1–1)
BILIRUB UR QL STRIP.AUTO: NEGATIVE
BUN SERPL-MCNC: 8 MG/DL (ref 6–20)
CALCIUM SERPL-MCNC: 8.4 MG/DL (ref 8.7–10.5)
CHLORIDE SERPL-SCNC: 113 MMOL/L (ref 95–110)
CLARITY UR: CLEAR
CO2 SERPL-SCNC: 24 MMOL/L (ref 23–29)
COLOR UR AUTO: COLORLESS
CREAT SERPL-MCNC: 1.4 MG/DL (ref 0.5–1.4)
ERYTHROCYTE [DISTWIDTH] IN BLOOD BY AUTOMATED COUNT: 25.2 % (ref 11.5–14.5)
GFR SERPLBLD CREATININE-BSD FMLA CKD-EPI: 47 ML/MIN/1.73/M2
GLUCOSE SERPL-MCNC: 84 MG/DL (ref 70–110)
GLUCOSE UR QL STRIP: NEGATIVE
HCT VFR BLD AUTO: 28.6 % (ref 37–48.5)
HGB BLD-MCNC: 9.3 GM/DL (ref 12–16)
HGB UR QL STRIP: NEGATIVE
IMM GRANULOCYTES # BLD AUTO: 0.01 K/UL (ref 0–0.04)
IMM GRANULOCYTES NFR BLD AUTO: 0.1 % (ref 0–0.5)
INR PPP: 3.3 (ref 0.8–1.2)
KETONES UR QL STRIP: NEGATIVE
LEUKOCYTE ESTERASE UR QL STRIP: ABNORMAL
LYMPHOCYTES # BLD AUTO: 3.95 K/UL (ref 1–4.8)
MAGNESIUM SERPL-MCNC: 1.7 MG/DL (ref 1.6–2.6)
MCH RBC QN AUTO: 23.1 PG (ref 27–31)
MCHC RBC AUTO-ENTMCNC: 32.5 G/DL (ref 32–36)
MCV RBC AUTO: 71 FL (ref 82–98)
MICROSCOPIC COMMENT: ABNORMAL
NITRITE UR QL STRIP: NEGATIVE
NUCLEATED RBC (/100WBC) (OHS): 1 /100 WBC
PH UR STRIP: 6 [PH]
PLATELET # BLD AUTO: 487 K/UL (ref 150–450)
PMV BLD AUTO: 9.3 FL (ref 9.2–12.9)
POTASSIUM SERPL-SCNC: 3.9 MMOL/L (ref 3.5–5.1)
PROT SERPL-MCNC: 7 GM/DL (ref 6–8.4)
PROT UR QL STRIP: NEGATIVE
PROTHROMBIN TIME: 33.9 SECONDS (ref 9–12.5)
RBC # BLD AUTO: 4.02 M/UL (ref 4–5.4)
RBC #/AREA URNS AUTO: <1 /HPF (ref 0–4)
RELATIVE EOSINOPHIL (OHS): 3 %
RELATIVE LYMPHOCYTE (OHS): 38.1 % (ref 18–48)
RELATIVE MONOCYTE (OHS): 9 % (ref 4–15)
RELATIVE NEUTROPHIL (OHS): 49.3 % (ref 38–73)
SODIUM SERPL-SCNC: 143 MMOL/L (ref 136–145)
SP GR UR STRIP: 1.01
SQUAMOUS #/AREA URNS AUTO: 5 /HPF
UROBILINOGEN UR STRIP-ACNC: NEGATIVE EU/DL
WBC # BLD AUTO: 10.37 K/UL (ref 3.9–12.7)
WBC #/AREA URNS AUTO: 13 /HPF (ref 0–5)

## 2025-07-16 PROCEDURE — 81003 URINALYSIS AUTO W/O SCOPE: CPT | Performed by: HOSPITALIST

## 2025-07-16 PROCEDURE — 63600175 PHARM REV CODE 636 W HCPCS: Performed by: HOSPITALIST

## 2025-07-16 PROCEDURE — 94761 N-INVAS EAR/PLS OXIMETRY MLT: CPT

## 2025-07-16 PROCEDURE — 87077 CULTURE AEROBIC IDENTIFY: CPT | Performed by: HOSPITALIST

## 2025-07-16 PROCEDURE — 83735 ASSAY OF MAGNESIUM: CPT | Performed by: HOSPITALIST

## 2025-07-16 PROCEDURE — 11000001 HC ACUTE MED/SURG PRIVATE ROOM

## 2025-07-16 PROCEDURE — 85610 PROTHROMBIN TIME: CPT | Performed by: HOSPITALIST

## 2025-07-16 PROCEDURE — 85025 COMPLETE CBC W/AUTO DIFF WBC: CPT | Performed by: HOSPITALIST

## 2025-07-16 PROCEDURE — 80053 COMPREHEN METABOLIC PANEL: CPT | Performed by: HOSPITALIST

## 2025-07-16 PROCEDURE — 25000003 PHARM REV CODE 250: Performed by: HOSPITALIST

## 2025-07-16 RX ORDER — CEFTRIAXONE 1 G/1
1 INJECTION, POWDER, FOR SOLUTION INTRAMUSCULAR; INTRAVENOUS
Status: DISCONTINUED | OUTPATIENT
Start: 2025-07-16 | End: 2025-07-18

## 2025-07-16 RX ORDER — DIPHENHYDRAMINE HYDROCHLORIDE 50 MG/ML
25 INJECTION, SOLUTION INTRAMUSCULAR; INTRAVENOUS
Status: DISCONTINUED | OUTPATIENT
Start: 2025-07-16 | End: 2025-07-18 | Stop reason: HOSPADM

## 2025-07-16 RX ORDER — LIDOCAINE HYDROCHLORIDE 20 MG/ML
15 SOLUTION OROPHARYNGEAL ONCE
Status: COMPLETED | OUTPATIENT
Start: 2025-07-16 | End: 2025-07-16

## 2025-07-16 RX ORDER — ALUMINUM HYDROXIDE, MAGNESIUM HYDROXIDE, AND SIMETHICONE 1200; 120; 1200 MG/30ML; MG/30ML; MG/30ML
30 SUSPENSION ORAL ONCE
Status: COMPLETED | OUTPATIENT
Start: 2025-07-16 | End: 2025-07-16

## 2025-07-16 RX ORDER — AMLODIPINE BESYLATE 5 MG/1
5 TABLET ORAL DAILY
Status: DISCONTINUED | OUTPATIENT
Start: 2025-07-16 | End: 2025-07-17

## 2025-07-16 RX ADMIN — MORPHINE SULFATE 30 MG: 30 TABLET, FILM COATED, EXTENDED RELEASE ORAL at 08:07

## 2025-07-16 RX ADMIN — GABAPENTIN 600 MG: 300 CAPSULE ORAL at 08:07

## 2025-07-16 RX ADMIN — DICYCLOMINE HYDROCHLORIDE 10 MG: 10 CAPSULE ORAL at 08:07

## 2025-07-16 RX ADMIN — PANTOPRAZOLE SODIUM 40 MG: 40 TABLET, DELAYED RELEASE ORAL at 08:07

## 2025-07-16 RX ADMIN — METOPROLOL TARTRATE 25 MG: 25 TABLET, FILM COATED ORAL at 08:07

## 2025-07-16 RX ADMIN — DIPHENHYDRAMINE HYDROCHLORIDE 25 MG: 50 INJECTION INTRAMUSCULAR; INTRAVENOUS at 12:07

## 2025-07-16 RX ADMIN — GABAPENTIN 600 MG: 300 CAPSULE ORAL at 02:07

## 2025-07-16 RX ADMIN — SUCRALFATE ORAL 1 G: 1 SUSPENSION ORAL at 08:07

## 2025-07-16 RX ADMIN — SODIUM CHLORIDE: 9 INJECTION, SOLUTION INTRAVENOUS at 10:07

## 2025-07-16 RX ADMIN — HYDROMORPHONE HYDROCHLORIDE 1 MG: 1 INJECTION, SOLUTION INTRAMUSCULAR; INTRAVENOUS; SUBCUTANEOUS at 09:07

## 2025-07-16 RX ADMIN — SUCRALFATE ORAL 1 G: 1 SUSPENSION ORAL at 05:07

## 2025-07-16 RX ADMIN — SODIUM CHLORIDE: 9 INJECTION, SOLUTION INTRAVENOUS at 05:07

## 2025-07-16 RX ADMIN — HYDROMORPHONE HYDROCHLORIDE 1 MG: 1 INJECTION, SOLUTION INTRAMUSCULAR; INTRAVENOUS; SUBCUTANEOUS at 03:07

## 2025-07-16 RX ADMIN — MUPIROCIN: 20 OINTMENT TOPICAL at 08:07

## 2025-07-16 RX ADMIN — ALUMINUM HYDROXIDE, MAGNESIUM HYDROXIDE, AND DIMETHICONE 30 ML: 200; 20; 200 SUSPENSION ORAL at 10:07

## 2025-07-16 RX ADMIN — DIPHENHYDRAMINE HYDROCHLORIDE 25 MG: 50 INJECTION INTRAMUSCULAR; INTRAVENOUS at 03:07

## 2025-07-16 RX ADMIN — ASPIRIN 325 MG: 325 TABLET ORAL at 08:07

## 2025-07-16 RX ADMIN — DIPHENHYDRAMINE HYDROCHLORIDE 50 MG: 50 INJECTION INTRAMUSCULAR; INTRAVENOUS at 05:07

## 2025-07-16 RX ADMIN — DICYCLOMINE HYDROCHLORIDE 10 MG: 10 CAPSULE ORAL at 12:07

## 2025-07-16 RX ADMIN — DIPHENHYDRAMINE HYDROCHLORIDE 25 MG: 50 INJECTION INTRAMUSCULAR; INTRAVENOUS at 10:07

## 2025-07-16 RX ADMIN — ACETAMINOPHEN 1000 MG: 500 TABLET, FILM COATED ORAL at 05:07

## 2025-07-16 RX ADMIN — AMLODIPINE BESYLATE 5 MG: 5 TABLET ORAL at 05:07

## 2025-07-16 RX ADMIN — FLUOXETINE HYDROCHLORIDE 80 MG: 20 CAPSULE ORAL at 08:07

## 2025-07-16 RX ADMIN — OXYCODONE AND ACETAMINOPHEN 1 TABLET: 325; 10 TABLET ORAL at 08:07

## 2025-07-16 RX ADMIN — HYDROMORPHONE HYDROCHLORIDE 1 MG: 1 INJECTION, SOLUTION INTRAMUSCULAR; INTRAVENOUS; SUBCUTANEOUS at 01:07

## 2025-07-16 RX ADMIN — HYDROMORPHONE HYDROCHLORIDE 1 MG: 1 INJECTION, SOLUTION INTRAMUSCULAR; INTRAVENOUS; SUBCUTANEOUS at 06:07

## 2025-07-16 RX ADMIN — DIPHENHYDRAMINE HYDROCHLORIDE 25 MG: 50 INJECTION INTRAMUSCULAR; INTRAVENOUS at 06:07

## 2025-07-16 RX ADMIN — DICYCLOMINE HYDROCHLORIDE 10 MG: 10 CAPSULE ORAL at 05:07

## 2025-07-16 RX ADMIN — HYDROMORPHONE HYDROCHLORIDE 1 MG: 1 INJECTION, SOLUTION INTRAMUSCULAR; INTRAVENOUS; SUBCUTANEOUS at 12:07

## 2025-07-16 RX ADMIN — SUCRALFATE ORAL 1 G: 1 SUSPENSION ORAL at 10:07

## 2025-07-16 RX ADMIN — CEFTRIAXONE 1 G: 1 INJECTION, POWDER, FOR SOLUTION INTRAMUSCULAR; INTRAVENOUS at 05:07

## 2025-07-16 RX ADMIN — FOLIC ACID 1 MG: 1 TABLET ORAL at 08:07

## 2025-07-16 RX ADMIN — ACETAMINOPHEN 1000 MG: 500 TABLET, FILM COATED ORAL at 02:07

## 2025-07-16 RX ADMIN — LIDOCAINE HYDROCHLORIDE 15 ML: 20 SOLUTION ORAL at 10:07

## 2025-07-16 RX ADMIN — OXYCODONE AND ACETAMINOPHEN 1 TABLET: 325; 10 TABLET ORAL at 02:07

## 2025-07-16 RX ADMIN — DIPHENHYDRAMINE HYDROCHLORIDE 25 MG: 50 INJECTION INTRAMUSCULAR; INTRAVENOUS at 09:07

## 2025-07-16 RX ADMIN — ACETAMINOPHEN 1000 MG: 500 TABLET, FILM COATED ORAL at 09:07

## 2025-07-16 RX ADMIN — HYDROMORPHONE HYDROCHLORIDE 1 MG: 1 INJECTION, SOLUTION INTRAMUSCULAR; INTRAVENOUS; SUBCUTANEOUS at 04:07

## 2025-07-16 RX ADMIN — OXYCODONE AND ACETAMINOPHEN 1 TABLET: 325; 10 TABLET ORAL at 10:07

## 2025-07-16 NOTE — NURSING
Walked into pts room to place her back on tele monitor- patient disconnected herself from fluids with no gloves and walked next door to take care of her mother who is the patient next door- pt educated about the risk of infection and to please call us when she needs to be disconnected. Pt verbalized understanding

## 2025-07-16 NOTE — PLAN OF CARE
Problem: Pain Acute  Goal: Optimal Pain Control and Function  Outcome: Progressing     Problem: Sickle Cell Disease  Goal: Optimal Cerebral Tissue Perfusion  Outcome: Progressing  Goal: Optimal Coping with Sickle Cell Disease  Outcome: Progressing  Goal: Effective Tissue Perfusion  Outcome: Progressing  Goal: Absence of Infection Signs and Symptoms  Outcome: Progressing  Goal: Optimal Pain Control and Function  Outcome: Progressing  Goal: Optimal Oxygenation  Outcome: Progressing     Problem: Fall Injury Risk  Goal: Absence of Fall and Fall-Related Injury  Outcome: Progressing     Problem: Infection  Goal: Absence of Infection Signs and Symptoms  Outcome: Progressing

## 2025-07-16 NOTE — ASSESSMENT & PLAN NOTE
-Unclear why blood cultures were collected on admit  -Blood cultures growing Staph epidermidis - most likely a contamination from skin telly.  -No clinical signs of active infection at this time.  -If any signs of infection will repeat blood cultures  -No antibiotics at this time.

## 2025-07-16 NOTE — SUBJECTIVE & OBJECTIVE
Interval History: No acute events overnight.  Appears comfortable.  Eating without difficulty.  Still with abdominal / chest pain.  All questions answered and patient had no further complaints.    Objective:     Vital Signs (Most Recent):  Temp: 98.4 °F (36.9 °C) (07/16/25 1234)  Pulse: 84 (07/16/25 1234)  Resp: 18 (07/16/25 1240)  BP: (!) 160/80 (07/16/25 1234)  SpO2: 96 % (07/16/25 1234) Vital Signs (24h Range):  Temp:  [97.9 °F (36.6 °C)-98.7 °F (37.1 °C)] 98.4 °F (36.9 °C)  Pulse:  [59-84] 84  Resp:  [16-20] 18  SpO2:  [95 %-98 %] 96 %  BP: ()/(55-91) 160/80     Weight: 90.7 kg (199 lb 15.3 oz)  Body mass index is 36.57 kg/m².    Intake/Output Summary (Last 24 hours) at 7/16/2025 1241  Last data filed at 7/16/2025 0636  Gross per 24 hour   Intake 3933.63 ml   Output 0 ml   Net 3933.63 ml         Physical Exam  Constitutional:       General: She is not in acute distress.     Appearance: She is obese. She is not ill-appearing or diaphoretic.   HENT:      Head: Normocephalic and atraumatic.      Nose: Nose normal.      Mouth/Throat:      Mouth: Mucous membranes are moist.   Eyes:      Extraocular Movements: Extraocular movements intact.   Neck:      Thyroid: No thyromegaly.      Vascular: No JVD.      Trachea: No tracheal deviation.   Cardiovascular:      Rate and Rhythm: Normal rate and regular rhythm.      Heart sounds: Normal heart sounds. No murmur heard.     No friction rub. No gallop.      Comments: Right chest wall port  Pulmonary:      Effort: Pulmonary effort is normal. No respiratory distress.      Breath sounds: Normal breath sounds. No stridor. No wheezing or rales.   Chest:      Chest wall: No tenderness.   Abdominal:      General: Bowel sounds are normal. There is no distension.      Palpations: Abdomen is soft. There is no mass.      Tenderness: There is no abdominal tenderness. There is no guarding or rebound.   Musculoskeletal:         General: No tenderness. Normal range of motion.       Cervical back: Normal range of motion and neck supple.   Skin:     General: Skin is warm and dry.      Coloration: Skin is not pale.      Findings: No erythema or rash.   Neurological:      Mental Status: She is alert and oriented to person, place, and time.      Cranial Nerves: No cranial nerve deficit.      Motor: No abnormal muscle tone.      Coordination: Coordination normal.      Deep Tendon Reflexes: Reflexes are normal and symmetric. Reflexes normal.   Psychiatric:         Mood and Affect: Mood is anxious.         Behavior: Behavior normal.         Thought Content: Thought content normal.         Judgment: Judgment normal.               Significant Labs: All pertinent labs within the past 24 hours have been reviewed.    Significant Imaging: I have reviewed all pertinent imaging results/findings within the past 24 hours.

## 2025-07-16 NOTE — PLAN OF CARE
ED to Hosp-Admission (Current) from 7/14/2025 in [facility] ([place])     7/16/2025    0928   Medicare Message     Important Message from Medicare regarding Discharge Appeal Rights Explained to patient/caregiver; Signed/date by patient/caregiver   Date IMM was signed 7/14/2025   Time IMM was signed [id number]

## 2025-07-16 NOTE — ASSESSMENT & PLAN NOTE
-Admitted to inpatient status  -Presented for evaluation of sudden onset of severe heavy pressure chest pain which woke her from sleep.  Pain is not typical of prior sickle cell pain crisis which she states usually involves lower extremity pain which she denies at this time.  Chart review does reveal that she has had chest pain during prior sickle cell pain crisis.    -CXR is clear with normal oxygen saturations which  speaks against an acute chest syndrome. -Reticulocyte count is normal.  Hb at baseline. T bili normal  -CTA chest ruled out PE and dissection  -Troponins negative.  Echocardiogram showed normal EF, grade 1 diastolic dysfunction and segmental wall motion abnormalities.  Cardiology consulted and input appreciated- this is unlikely to be cardiac in nature  -Noted history of chronic gastritis.  Reviewed EGD from 11/2024 showing gastritis and duodenitis with biopsies negative for malignancy and H.pylori  -Suspect her pain is secondary to gastritis / esophagitis and possible esophageal spasm - all in the context of likely opioid receptor hyperalgesia due to chronic need for opioid pain medications  -Noted hypotension and oversedation yesterday.  Today she appears comfortable and is eating without difficulty.  Hb is 9.3 and T bili remains normal.  Reports still with abdominal and chest pain.  -BP improved after bolus 1 L NS 7/15 and holding pain meds.  Lactic acid was normal  -Continue folic acid and basal IV fluids.  Continue home percocet.  Continue iv dilaudid at lower dose and for breakthrough pain only with hold parameters for hypotension or oversedation.  So long as taking her oral pain medicatons, ok to receive iv dilaudid without waiting prolonged period.    -Continue bid PPI, carafate QID and dicyclomine qid.  Add GI cocktail today  -Repeat labs in AM  -Hopeful to be able to discharge home tomorrow

## 2025-07-16 NOTE — PROGRESS NOTES
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 this morning.  Patient reports  shortness of the breath.  Patient took home pain medication without relief.  She reports her typical sickle cell pain crisis involves pain in her lower extremities.  She denies significant lower extremity pain currently.  She denies any fever, cough, recent sick contacts or travel.  She reports no relief despite receiving intravenous pain medication in the emergency department.    Overview/Hospital Course:  No notes on file    Interval History: No acute events overnight.  Appears comfortable.  Eating without difficulty.  Still with abdominal / chest pain.  All questions answered and patient had no further complaints.    Objective:     Vital Signs (Most Recent):  Temp: 98.4 °F (36.9 °C) (07/16/25 1234)  Pulse: 84 (07/16/25 1234)  Resp: 18 (07/16/25 1240)  BP: (!) 160/80 (07/16/25 1234)  SpO2: 96 % (07/16/25 1234) Vital Signs (24h Range):  Temp:  [97.9 °F (36.6 °C)-98.7 °F (37.1 °C)] 98.4 °F (36.9 °C)  Pulse:  [59-84] 84  Resp:  [16-20] 18  SpO2:  [95 %-98 %] 96 %  BP: ()/(55-91) 160/80     Weight: 90.7 kg (199 lb 15.3 oz)  Body mass index is 36.57 kg/m².    Intake/Output Summary (Last 24 hours) at 7/16/2025 1241  Last data  filed at 7/16/2025 0636  Gross per 24 hour   Intake 3933.63 ml   Output 0 ml   Net 3933.63 ml         Physical Exam  Constitutional:       General: She is not in acute distress.     Appearance: She is obese. She is not ill-appearing or diaphoretic.   HENT:      Head: Normocephalic and atraumatic.      Nose: Nose normal.      Mouth/Throat:      Mouth: Mucous membranes are moist.   Eyes:      Extraocular Movements: Extraocular movements intact.   Neck:      Thyroid: No thyromegaly.      Vascular: No JVD.      Trachea: No tracheal deviation.   Cardiovascular:      Rate and Rhythm: Normal rate and regular rhythm.      Heart sounds: Normal heart sounds. No murmur heard.     No friction rub. No gallop.      Comments: Right chest wall port  Pulmonary:      Effort: Pulmonary effort is normal. No respiratory distress.      Breath sounds: Normal breath sounds. No stridor. No wheezing or rales.   Chest:      Chest wall: No tenderness.   Abdominal:      General: Bowel sounds are normal. There is no distension.      Palpations: Abdomen is soft. There is no mass.      Tenderness: There is no abdominal tenderness. There is no guarding or rebound.   Musculoskeletal:         General: No tenderness. Normal range of motion.      Cervical back: Normal range of motion and neck supple.   Skin:     General: Skin is warm and dry.      Coloration: Skin is not pale.      Findings: No erythema or rash.   Neurological:      Mental Status: She is alert and oriented to person, place, and time.      Cranial Nerves: No cranial nerve deficit.      Motor: No abnormal muscle tone.      Coordination: Coordination normal.      Deep Tendon Reflexes: Reflexes are normal and symmetric. Reflexes normal.   Psychiatric:         Mood and Affect: Mood is anxious.         Behavior: Behavior normal.         Thought Content: Thought content normal.         Judgment: Judgment normal.               Significant Labs: All pertinent labs within the past 24 hours have  been reviewed.    Significant Imaging: I have reviewed all pertinent imaging results/findings within the past 24 hours.      Assessment & Plan  Vaso-occlusive pain due to sickle cell disease  Atypical chest pain  Chronic gastritis  Chronic pain syndrome  -Admitted to inpatient status  -Presented for evaluation of sudden onset of severe heavy pressure chest pain which woke her from sleep.  Pain is not typical of prior sickle cell pain crisis which she states usually involves lower extremity pain which she denies at this time.  Chart review does reveal that she has had chest pain during prior sickle cell pain crisis.    -CXR is clear with normal oxygen saturations which  speaks against an acute chest syndrome. -Reticulocyte count is normal.  Hb at baseline. T bili normal  -CTA chest ruled out PE and dissection  -Troponins negative.  Echocardiogram showed normal EF, grade 1 diastolic dysfunction and segmental wall motion abnormalities.  Cardiology consulted and input appreciated- this is unlikely to be cardiac in nature  -Noted history of chronic gastritis.  Reviewed EGD from 11/2024 showing gastritis and duodenitis with biopsies negative for malignancy and H.pylori  -Suspect her pain is secondary to gastritis / esophagitis and possible esophageal spasm - all in the context of likely opioid receptor hyperalgesia due to chronic need for opioid pain medications  -Noted hypotension and oversedation yesterday.  Today she appears comfortable and is eating without difficulty.  Hb is 9.3 and T bili remains normal.  Reports still with abdominal and chest pain.  -BP improved after bolus 1 L NS 7/15 and holding pain meds.  Lactic acid was normal  -Continue folic acid and basal IV fluids.  Continue home percocet.  Continue iv dilaudid at lower dose and for breakthrough pain only with hold parameters for hypotension or oversedation.  So long as taking her oral pain medicatons, ok to receive iv dilaudid without waiting prolonged  period.    -Continue bid PPI, carafate QID and dicyclomine qid.  Add GI cocktail today  -Repeat labs in AM  -Hopeful to be able to discharge home tomorrow  Gram-positive cocci bacteremia  -Unclear why blood cultures were collected on admit  -Blood cultures growing Staph epidermidis - most likely a contamination from skin telly.  -No clinical signs of active infection at this time.  -If any signs of infection will repeat blood cultures  -No antibiotics at this time.  Hypertension  Hypotension  -BP mildly elevated on admit and on 7/15 SBP dropped into low 80s.  BP improved after fluid bolus.  Today bp is moderately elevated  -Continue hold parameters on metoprolol and IV dilaudid  -Monitor closely.  Obesity  Body mass index is 36.57 kg/m². Morbid obesity complicates all aspects of disease management from diagnostic modalities to treatment. Weight loss encouraged and health benefits explained to patient.  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    -INR 3.3 today  -Hold warfarin today  -Check daily pt/INR  VTE Risk Mitigation (From admission, onward)           Ordered     IP VTE HIGH RISK PATIENT  Once         07/14/25 0941     Place JOSE LUIS hose  Until discontinued         07/14/25 0930     Place sequential compression device  Until discontinued         07/14/25 0930     Reason for No Pharmacological VTE Prophylaxis  Once        Question:  Reasons:  Answer:  Already adequately anticoagulated on oral Anticoagulants    07/14/25 0930                    Discharge Planning   ALYSE: 7/16/2025     Code Status: Full Code   Medical Readiness for Discharge Date:   Discharge Plan A: Home with family                        Keyur Hernandez MD  Department of Hospital Medicine   [facility]

## 2025-07-16 NOTE — ASSESSMENT & PLAN NOTE
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    -INR 3.3 today  -Hold warfarin today  -Check daily pt/INR

## 2025-07-16 NOTE — ASSESSMENT & PLAN NOTE
-BP mildly elevated on admit and on 7/15 SBP dropped into low 80s.  BP improved after fluid bolus.  Today bp is moderately elevated  -Continue hold parameters on metoprolol and IV dilaudid  -Monitor closely.

## 2025-07-17 ENCOUNTER — TELEPHONE (OUTPATIENT)
Dept: HEMATOLOGY/ONCOLOGY | Facility: CLINIC | Age: 45
End: 2025-07-17
Payer: MEDICARE

## 2025-07-17 ENCOUNTER — OFFICE VISIT (OUTPATIENT)
Dept: HEMATOLOGY/ONCOLOGY | Facility: CLINIC | Age: 45
End: 2025-07-17
Payer: MEDICARE

## 2025-07-17 DIAGNOSIS — Z79.01 CHRONIC ANTICOAGULATION: Chronic | ICD-10-CM

## 2025-07-17 PROBLEM — N30.00 ACUTE CYSTITIS: Status: ACTIVE | Noted: 2025-07-17

## 2025-07-17 PROBLEM — K59.00 CONSTIPATION: Status: ACTIVE | Noted: 2025-07-17

## 2025-07-17 LAB
ABSOLUTE EOSINOPHIL (OHS): 0.39 K/UL
ABSOLUTE MONOCYTE (OHS): 0.66 K/UL (ref 0.3–1)
ABSOLUTE NEUTROPHIL COUNT (OHS): 4.02 K/UL (ref 1.8–7.7)
ALBUMIN SERPL BCP-MCNC: 3.3 G/DL (ref 3.5–5.2)
ALP SERPL-CCNC: 94 UNIT/L (ref 40–150)
ALT SERPL W/O P-5'-P-CCNC: 11 UNIT/L (ref 10–44)
ANION GAP (OHS): 10 MMOL/L (ref 8–16)
AST SERPL-CCNC: 25 UNIT/L (ref 11–45)
BASOPHILS # BLD AUTO: 0.04 K/UL
BASOPHILS NFR BLD AUTO: 0.5 %
BILIRUB SERPL-MCNC: 0.3 MG/DL (ref 0.1–1)
BUN SERPL-MCNC: 10 MG/DL (ref 6–20)
CALCIUM SERPL-MCNC: 8.7 MG/DL (ref 8.7–10.5)
CHLORIDE SERPL-SCNC: 107 MMOL/L (ref 95–110)
CO2 SERPL-SCNC: 25 MMOL/L (ref 23–29)
CREAT SERPL-MCNC: 0.9 MG/DL (ref 0.5–1.4)
ERYTHROCYTE [DISTWIDTH] IN BLOOD BY AUTOMATED COUNT: 25.2 % (ref 11.5–14.5)
GFR SERPLBLD CREATININE-BSD FMLA CKD-EPI: >60 ML/MIN/1.73/M2
GLUCOSE SERPL-MCNC: 91 MG/DL (ref 70–110)
HCT VFR BLD AUTO: 29.6 % (ref 37–48.5)
HGB BLD-MCNC: 9.7 GM/DL (ref 12–16)
HOLD SPECIMEN: NORMAL
IMM GRANULOCYTES # BLD AUTO: 0.01 K/UL (ref 0–0.04)
IMM GRANULOCYTES NFR BLD AUTO: 0.1 % (ref 0–0.5)
INR PPP: 3.3 (ref 0.8–1.2)
LYMPHOCYTES # BLD AUTO: 3.5 K/UL (ref 1–4.8)
MAGNESIUM SERPL-MCNC: 1.6 MG/DL (ref 1.6–2.6)
MCH RBC QN AUTO: 22.6 PG (ref 27–31)
MCHC RBC AUTO-ENTMCNC: 32.8 G/DL (ref 32–36)
MCV RBC AUTO: 69 FL (ref 82–98)
NUCLEATED RBC (/100WBC) (OHS): 1 /100 WBC
PLATELET # BLD AUTO: 480 K/UL (ref 150–450)
PMV BLD AUTO: 9 FL (ref 9.2–12.9)
POTASSIUM SERPL-SCNC: 3.4 MMOL/L (ref 3.5–5.1)
PROT SERPL-MCNC: 7.3 GM/DL (ref 6–8.4)
PROTHROMBIN TIME: 33.8 SECONDS (ref 9–12.5)
RBC # BLD AUTO: 4.29 M/UL (ref 4–5.4)
RELATIVE EOSINOPHIL (OHS): 4.5 %
RELATIVE LYMPHOCYTE (OHS): 40.6 % (ref 18–48)
RELATIVE MONOCYTE (OHS): 7.7 % (ref 4–15)
RELATIVE NEUTROPHIL (OHS): 46.6 % (ref 38–73)
SODIUM SERPL-SCNC: 142 MMOL/L (ref 136–145)
WBC # BLD AUTO: 8.62 K/UL (ref 3.9–12.7)

## 2025-07-17 PROCEDURE — 11000001 HC ACUTE MED/SURG PRIVATE ROOM

## 2025-07-17 PROCEDURE — 25000003 PHARM REV CODE 250: Performed by: HOSPITALIST

## 2025-07-17 PROCEDURE — 25000003 PHARM REV CODE 250: Performed by: INTERNAL MEDICINE

## 2025-07-17 PROCEDURE — 80053 COMPREHEN METABOLIC PANEL: CPT | Performed by: HOSPITALIST

## 2025-07-17 PROCEDURE — 83735 ASSAY OF MAGNESIUM: CPT | Performed by: HOSPITALIST

## 2025-07-17 PROCEDURE — 85610 PROTHROMBIN TIME: CPT | Performed by: HOSPITALIST

## 2025-07-17 PROCEDURE — 85025 COMPLETE CBC W/AUTO DIFF WBC: CPT | Performed by: HOSPITALIST

## 2025-07-17 PROCEDURE — A4216 STERILE WATER/SALINE, 10 ML: HCPCS | Performed by: EMERGENCY MEDICINE

## 2025-07-17 PROCEDURE — 63600175 PHARM REV CODE 636 W HCPCS: Performed by: HOSPITALIST

## 2025-07-17 PROCEDURE — 63600175 PHARM REV CODE 636 W HCPCS: Performed by: EMERGENCY MEDICINE

## 2025-07-17 PROCEDURE — 99499 UNLISTED E&M SERVICE: CPT | Mod: 95,,, | Performed by: INTERNAL MEDICINE

## 2025-07-17 PROCEDURE — 94761 N-INVAS EAR/PLS OXIMETRY MLT: CPT

## 2025-07-17 PROCEDURE — 25000003 PHARM REV CODE 250: Performed by: EMERGENCY MEDICINE

## 2025-07-17 RX ORDER — DOCUSATE SODIUM 100 MG/1
100 CAPSULE, LIQUID FILLED ORAL 2 TIMES DAILY
Status: DISCONTINUED | OUTPATIENT
Start: 2025-07-17 | End: 2025-07-18 | Stop reason: HOSPADM

## 2025-07-17 RX ORDER — SENNOSIDES 8.6 MG/1
17.2 TABLET ORAL NIGHTLY
Status: DISCONTINUED | OUTPATIENT
Start: 2025-07-17 | End: 2025-07-18 | Stop reason: HOSPADM

## 2025-07-17 RX ORDER — AMLODIPINE BESYLATE 5 MG/1
5 TABLET ORAL ONCE
Status: COMPLETED | OUTPATIENT
Start: 2025-07-17 | End: 2025-07-17

## 2025-07-17 RX ORDER — AMLODIPINE BESYLATE 5 MG/1
10 TABLET ORAL DAILY
Status: DISCONTINUED | OUTPATIENT
Start: 2025-07-18 | End: 2025-07-18 | Stop reason: HOSPADM

## 2025-07-17 RX ORDER — HYDRALAZINE HYDROCHLORIDE 20 MG/ML
15 INJECTION INTRAMUSCULAR; INTRAVENOUS EVERY 4 HOURS PRN
Status: DISCONTINUED | OUTPATIENT
Start: 2025-07-17 | End: 2025-07-18 | Stop reason: HOSPADM

## 2025-07-17 RX ORDER — POTASSIUM CHLORIDE 750 MG/1
30 TABLET, EXTENDED RELEASE ORAL ONCE
Status: COMPLETED | OUTPATIENT
Start: 2025-07-17 | End: 2025-07-17

## 2025-07-17 RX ORDER — ALUMINUM HYDROXIDE, MAGNESIUM HYDROXIDE, AND SIMETHICONE 1200; 120; 1200 MG/30ML; MG/30ML; MG/30ML
30 SUSPENSION ORAL ONCE
Status: COMPLETED | OUTPATIENT
Start: 2025-07-17 | End: 2025-07-17

## 2025-07-17 RX ORDER — HYDROMORPHONE HYDROCHLORIDE 1 MG/ML
1 INJECTION, SOLUTION INTRAMUSCULAR; INTRAVENOUS; SUBCUTANEOUS EVERY 4 HOURS PRN
Status: DISCONTINUED | OUTPATIENT
Start: 2025-07-17 | End: 2025-07-18 | Stop reason: HOSPADM

## 2025-07-17 RX ORDER — FAMOTIDINE 20 MG/1
40 TABLET, FILM COATED ORAL NIGHTLY
Status: DISCONTINUED | OUTPATIENT
Start: 2025-07-17 | End: 2025-07-18 | Stop reason: HOSPADM

## 2025-07-17 RX ORDER — LIDOCAINE HYDROCHLORIDE 20 MG/ML
15 SOLUTION OROPHARYNGEAL ONCE
Status: COMPLETED | OUTPATIENT
Start: 2025-07-17 | End: 2025-07-17

## 2025-07-17 RX ORDER — PROCHLORPERAZINE EDISYLATE 5 MG/ML
2.5 INJECTION INTRAMUSCULAR; INTRAVENOUS EVERY 6 HOURS PRN
Status: DISCONTINUED | OUTPATIENT
Start: 2025-07-17 | End: 2025-07-18 | Stop reason: HOSPADM

## 2025-07-17 RX ADMIN — ACETAMINOPHEN 1000 MG: 500 TABLET, FILM COATED ORAL at 05:07

## 2025-07-17 RX ADMIN — DIPHENHYDRAMINE HYDROCHLORIDE 25 MG: 50 INJECTION INTRAMUSCULAR; INTRAVENOUS at 01:07

## 2025-07-17 RX ADMIN — DIPHENHYDRAMINE HYDROCHLORIDE 25 MG: 50 INJECTION INTRAMUSCULAR; INTRAVENOUS at 06:07

## 2025-07-17 RX ADMIN — PANTOPRAZOLE SODIUM 40 MG: 40 TABLET, DELAYED RELEASE ORAL at 08:07

## 2025-07-17 RX ADMIN — AMLODIPINE BESYLATE 5 MG: 5 TABLET ORAL at 01:07

## 2025-07-17 RX ADMIN — GABAPENTIN 600 MG: 300 CAPSULE ORAL at 02:07

## 2025-07-17 RX ADMIN — HYDROMORPHONE HYDROCHLORIDE 1 MG: 1 INJECTION, SOLUTION INTRAMUSCULAR; INTRAVENOUS; SUBCUTANEOUS at 01:07

## 2025-07-17 RX ADMIN — METOPROLOL TARTRATE 25 MG: 25 TABLET, FILM COATED ORAL at 08:07

## 2025-07-17 RX ADMIN — ALUMINUM HYDROXIDE, MAGNESIUM HYDROXIDE, AND DIMETHICONE 30 ML: 200; 20; 200 SUSPENSION ORAL at 11:07

## 2025-07-17 RX ADMIN — SUCRALFATE ORAL 1 G: 1 SUSPENSION ORAL at 05:07

## 2025-07-17 RX ADMIN — ACETAMINOPHEN 1000 MG: 500 TABLET, FILM COATED ORAL at 10:07

## 2025-07-17 RX ADMIN — OXYCODONE AND ACETAMINOPHEN 1 TABLET: 325; 10 TABLET ORAL at 08:07

## 2025-07-17 RX ADMIN — LACTULOSE 20 G: 20 SOLUTION ORAL at 02:07

## 2025-07-17 RX ADMIN — HYDROMORPHONE HYDROCHLORIDE 1 MG: 1 INJECTION, SOLUTION INTRAMUSCULAR; INTRAVENOUS; SUBCUTANEOUS at 02:07

## 2025-07-17 RX ADMIN — METOPROLOL TARTRATE 25 MG: 25 TABLET, FILM COATED ORAL at 10:07

## 2025-07-17 RX ADMIN — FOLIC ACID 1 MG: 1 TABLET ORAL at 08:07

## 2025-07-17 RX ADMIN — SUCRALFATE ORAL 1 G: 1 SUSPENSION ORAL at 04:07

## 2025-07-17 RX ADMIN — DICYCLOMINE HYDROCHLORIDE 10 MG: 10 CAPSULE ORAL at 01:07

## 2025-07-17 RX ADMIN — FAMOTIDINE 40 MG: 20 TABLET, FILM COATED ORAL at 10:07

## 2025-07-17 RX ADMIN — Medication 10 ML: at 10:07

## 2025-07-17 RX ADMIN — AMLODIPINE BESYLATE 5 MG: 5 TABLET ORAL at 08:07

## 2025-07-17 RX ADMIN — DIPHENHYDRAMINE HYDROCHLORIDE 25 MG: 50 INJECTION INTRAMUSCULAR; INTRAVENOUS at 02:07

## 2025-07-17 RX ADMIN — DIPHENHYDRAMINE HYDROCHLORIDE 25 MG: 50 INJECTION INTRAMUSCULAR; INTRAVENOUS at 10:07

## 2025-07-17 RX ADMIN — DICYCLOMINE HYDROCHLORIDE 10 MG: 10 CAPSULE ORAL at 10:07

## 2025-07-17 RX ADMIN — OXYCODONE AND ACETAMINOPHEN 1 TABLET: 325; 10 TABLET ORAL at 07:07

## 2025-07-17 RX ADMIN — OXYCODONE AND ACETAMINOPHEN 1 TABLET: 325; 10 TABLET ORAL at 01:07

## 2025-07-17 RX ADMIN — HYDROMORPHONE HYDROCHLORIDE 1 MG: 1 INJECTION, SOLUTION INTRAMUSCULAR; INTRAVENOUS; SUBCUTANEOUS at 05:07

## 2025-07-17 RX ADMIN — SUCRALFATE ORAL 1 G: 1 SUSPENSION ORAL at 10:07

## 2025-07-17 RX ADMIN — MORPHINE SULFATE 30 MG: 30 TABLET, FILM COATED, EXTENDED RELEASE ORAL at 10:07

## 2025-07-17 RX ADMIN — MORPHINE SULFATE 30 MG: 30 TABLET, FILM COATED, EXTENDED RELEASE ORAL at 08:07

## 2025-07-17 RX ADMIN — PANTOPRAZOLE SODIUM 40 MG: 40 TABLET, DELAYED RELEASE ORAL at 10:07

## 2025-07-17 RX ADMIN — HYDROMORPHONE HYDROCHLORIDE 1 MG: 1 INJECTION, SOLUTION INTRAMUSCULAR; INTRAVENOUS; SUBCUTANEOUS at 10:07

## 2025-07-17 RX ADMIN — DIPHENHYDRAMINE HYDROCHLORIDE 25 MG: 50 INJECTION INTRAMUSCULAR; INTRAVENOUS at 08:07

## 2025-07-17 RX ADMIN — OXYCODONE AND ACETAMINOPHEN 1 TABLET: 325; 10 TABLET ORAL at 04:07

## 2025-07-17 RX ADMIN — ASPIRIN 325 MG: 325 TABLET ORAL at 08:07

## 2025-07-17 RX ADMIN — LACTULOSE 20 G: 20 SOLUTION ORAL at 10:07

## 2025-07-17 RX ADMIN — DICYCLOMINE HYDROCHLORIDE 10 MG: 10 CAPSULE ORAL at 08:07

## 2025-07-17 RX ADMIN — POTASSIUM CHLORIDE 30 MEQ: 750 TABLET, EXTENDED RELEASE ORAL at 10:07

## 2025-07-17 RX ADMIN — DOCUSATE SODIUM 100 MG: 100 CAPSULE, LIQUID FILLED ORAL at 10:07

## 2025-07-17 RX ADMIN — SENNOSIDES 17.2 MG: 8.6 TABLET, FILM COATED ORAL at 10:07

## 2025-07-17 RX ADMIN — DIPHENHYDRAMINE HYDROCHLORIDE 25 MG: 50 INJECTION INTRAMUSCULAR; INTRAVENOUS at 11:07

## 2025-07-17 RX ADMIN — PROCHLORPERAZINE EDISYLATE 2.5 MG: 5 INJECTION INTRAMUSCULAR; INTRAVENOUS at 01:07

## 2025-07-17 RX ADMIN — GABAPENTIN 600 MG: 300 CAPSULE ORAL at 08:07

## 2025-07-17 RX ADMIN — HYDROMORPHONE HYDROCHLORIDE 1 MG: 1 INJECTION, SOLUTION INTRAMUSCULAR; INTRAVENOUS; SUBCUTANEOUS at 08:07

## 2025-07-17 RX ADMIN — CEFTRIAXONE 1 G: 1 INJECTION, POWDER, FOR SOLUTION INTRAMUSCULAR; INTRAVENOUS at 04:07

## 2025-07-17 RX ADMIN — DICYCLOMINE HYDROCHLORIDE 10 MG: 10 CAPSULE ORAL at 04:07

## 2025-07-17 RX ADMIN — ONDANSETRON 4 MG: 2 INJECTION INTRAMUSCULAR; INTRAVENOUS at 08:07

## 2025-07-17 RX ADMIN — GABAPENTIN 600 MG: 300 CAPSULE ORAL at 10:07

## 2025-07-17 RX ADMIN — HYDROMORPHONE HYDROCHLORIDE 1 MG: 1 INJECTION, SOLUTION INTRAMUSCULAR; INTRAVENOUS; SUBCUTANEOUS at 11:07

## 2025-07-17 RX ADMIN — HYDROMORPHONE HYDROCHLORIDE 1 MG: 1 INJECTION, SOLUTION INTRAMUSCULAR; INTRAVENOUS; SUBCUTANEOUS at 06:07

## 2025-07-17 RX ADMIN — HYDRALAZINE HYDROCHLORIDE 15 MG: 20 INJECTION INTRAMUSCULAR; INTRAVENOUS at 05:07

## 2025-07-17 RX ADMIN — LIDOCAINE HYDROCHLORIDE 15 ML: 20 SOLUTION ORAL at 11:07

## 2025-07-17 RX ADMIN — FLUOXETINE HYDROCHLORIDE 80 MG: 20 CAPSULE ORAL at 08:07

## 2025-07-17 RX ADMIN — DIPHENHYDRAMINE HYDROCHLORIDE 25 MG: 50 INJECTION INTRAMUSCULAR; INTRAVENOUS at 05:07

## 2025-07-17 NOTE — CONSULTS
Gastroenterology Consult    7/17/2025 12:17 PM    Patient Name: Nazanin Malone  MRN: [id number]  Admission Date: 7/14/2025  Hospital Length of Stay: 3 days  Code Status: Full Code   Primary Care Physician: No, Primary Doctor  Principal Problem:Vaso-occlusive pain due to sickle cell disease  Consulting Physician: Inpatient consult to Gastroenterology  Consult performed by: Mariposa Shearer MD  Consult ordered by: Keyur Hernandez MD        Reason for consultation: gastritis    HPI:  Nazanin Malone is a 45 y.o. female with a history of sickle cell disease w/ prior acute chest syndrome, DVT/PE, HTN, obesity, avascular necrosis of femur, opioid dependence who presented with chest pain that awoke her from sleep around 4 am.  Also had associated SOB.  No N/V, heartburn, regurgitation or dysphagia.  She thinks this feels different than a typical pain crisis but that it could be a pain crisis.  No cough or fever.  No sick contacts.  No exacerbation of pain with eating (she has just nearly finished lunch when I saw her).  Pain radiates from epigastrium to chest and feels like a heaviness.  She denies radiation to back/shoulder blades or lower down into abdomen.  She is constipated 2/2 opiates - no BM in several days.  Takes lactulose and sennakot 2 pills QHS at home.  No overt GI bleeding.  No prior colonoscopy - hesitant to get one as her mother developed AMS after her procedure and now has dementia (this was 1.5 yrs ago).  EGD last year w/ gastritis.  No ulcers.  HP neg.  No NSAIDs.      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/2017    Acute venous embolism and thrombosis of internal jugular veins     Asthma     Avascular necrosis of femur     Depression     History of pulmonary embolism     Hypertension     Morbid obesity     Opioid dependence 04/16/2017    Pneumonia due to Streptococcus pneumoniae 04/25/2017     "Right lower lobe pneumonia 2017    Sepsis due to Streptococcus pneumoniae 2017    Sickle cell-beta thalassemia disease with pain     Trigeminal neuralgia        Past Surgical History:   Procedure Laterality Date     SECTION      ESOPHAGOGASTRODUODENOSCOPY N/A 2024    Procedure: EGD (ESOPHAGOGASTRODUODENOSCOPY);  Surgeon: Allen Marshall MD;  Location: [address]);  Service: Gastroenterology;  Laterality: N/A;    TONSILLECTOMY      TUBAL LIGATION         Social History[1]     Family History   Problem Relation Name Age of Onset    Heart disease Mother      Diabetes Mother      Heart disease Father      Breast cancer Neg Hx      Ovarian cancer Neg Hx      Colon cancer Neg Hx           Review of patient's allergies indicates:   Allergen Reactions    Cat dander Anaphylaxis, Itching and Shortness Of Breath    Nsaids (non-steroidal anti-inflammatory drug) Anaphylaxis, Itching and Shortness Of Breath    Latex Rash       Current Medications[2]    Review of Systems   Cardiovascular:  Positive for chest pain.   Gastrointestinal:  Positive for abdominal pain and constipation.   All other systems reviewed and are negative.      BP (!) 141/104 (BP Location: Left arm, Patient Position: Lying)   Pulse 75   Temp 98.9 °F (37.2 °C) (Oral)   Resp 18   Ht 5' 2" (1.575 m)   Wt 90.7 kg (199 lb 15.3 oz)   LMP  (LMP Unknown)   SpO2 (!) 93%   Breastfeeding No   BMI 36.57 kg/m²   Physical Exam  Constitutional:       General: She is not in acute distress.     Appearance: Normal appearance.   HENT:      Head: Normocephalic and atraumatic.      Right Ear: External ear normal.      Left Ear: External ear normal.      Nose: Nose normal. No congestion.      Mouth/Throat:      Mouth: Mucous membranes are moist.      Pharynx: Oropharynx is clear.   Eyes:      General: No scleral icterus.     Extraocular Movements: Extraocular movements intact.      Conjunctiva/sclera: Conjunctivae normal.   Cardiovascular:      " Rate and Rhythm: Normal rate and regular rhythm.      Heart sounds: Normal heart sounds. No murmur heard.  Pulmonary:      Effort: Pulmonary effort is normal. No respiratory distress.      Breath sounds: Normal breath sounds. No wheezing.   Abdominal:      General: Bowel sounds are normal. There is no distension.      Palpations: Abdomen is soft.      Tenderness: There is abdominal tenderness. There is no guarding or rebound.   Musculoskeletal:         General: No deformity.      Right lower leg: No edema.      Left lower leg: No edema.   Skin:     General: Skin is warm and dry.      Findings: No rash.   Neurological:      General: No focal deficit present.      Mental Status: She is alert and oriented to person, place, and time. Mental status is at baseline.      Sensory: No sensory deficit.   Psychiatric:         Mood and Affect: Mood normal.         Thought Content: Thought content normal.         Labs:  Lab Results   Component Value Date/Time    WBC 8.62 07/17/2025 05:51 AM    HGB 9.7 (L) 07/17/2025 05:51 AM    HGB 10.2 (L) 06/11/2025 06:23 AM    HGB 8.1 (L) 03/14/2025 04:25 AM    HCT 29.6 (L) 07/17/2025 05:51 AM    HCT 32.1 (L) 06/11/2025 06:23 AM    HCT 38.5 05/20/2025 01:31 PM     (H) 07/17/2025 05:51 AM     03/14/2025 04:25 AM    MCV 69 (L) 07/17/2025 05:51 AM    MCV 77.2 (L) 06/11/2025 06:23 AM    MCV 72 (L) 03/14/2025 04:25 AM     07/17/2025 05:51 AM     06/11/2025 06:23 AM     03/14/2025 04:25 AM    K 3.4 (L) 07/17/2025 05:51 AM    K 3.5 (L) 06/11/2025 06:23 AM    K 4.1 03/14/2025 04:25 AM     07/17/2025 05:51 AM     03/14/2025 04:25 AM    CO2 25 07/17/2025 05:51 AM    CO2 27 06/11/2025 06:23 AM    CO2 26 03/14/2025 04:25 AM    BUN 10 07/17/2025 05:51 AM    CREATININE 0.9 07/17/2025 05:51 AM    GLU 91 07/17/2025 05:51 AM    GLU 78 03/14/2025 04:25 AM    CALCIUM 8.7 07/17/2025 05:51 AM    CALCIUM 9.1 06/11/2025 06:23 AM    CALCIUM 9.0 03/14/2025 04:25 AM    MG  1.6 07/17/2025 05:51 AM    PHOS 3.8 07/15/2025 04:22 AM    PHOS 3.1 03/14/2025 04:25 AM    INR 3.3 (H) 07/17/2025 05:51 AM    INR 1.5 (H) 06/11/2025 06:23 AM    INR 1.0 02/10/2025 08:59 AM    APTT 28.7 07/14/2025 07:31 AM    APTT 46.0 (H) 06/06/2025 01:09 PM    APTT 56.7 (H) 08/16/2024 03:11 AM   ]  Lab Results   Component Value Date/Time    PROT 7.3 07/17/2025 05:51 AM    PROT 8.3 03/11/2025 09:13 AM    ALBUMIN 3.3 (L) 07/17/2025 05:51 AM    ALBUMIN 3.7 06/11/2025 06:23 AM    ALBUMIN 3.8 03/11/2025 09:13 AM    BILITOT 0.3 07/17/2025 05:51 AM    BILITOT 0.5 06/11/2025 06:23 AM    BILITOT 0.8 03/11/2025 09:13 AM    BILIDIR 0.1 01/19/2020 05:33 AM    AST 25 07/17/2025 05:51 AM    AST 19 06/11/2025 06:23 AM    AST 22 03/11/2025 09:13 AM    ALT 11 07/17/2025 05:51 AM    ALT 12 06/11/2025 06:23 AM    ALT 16 03/11/2025 09:13 AM    ALKPHOS 94 07/17/2025 05:51 AM    ALKPHOS 97 03/11/2025 09:13 AM   ]    Imaging and Procedures:  I personally reviewed the imaging/procedures below.  EGD 11/13/24:  - Normal esophagus.   - Gastric mucosal atrophy. Biopsied.   - Nodule found in the duodenum. Biopsied.     Pathology:  1. Duodenal nodule, biopsy:   Chronic duodenitis, characterized by foveolar metaplasia.   Prominent Brunner's gland.    No dysplasia or malignancy.     2. Stomach, biopsy:   Antral and oxyntic mucosa with chronic minimally active gastritis.   Helicobacter immunohistochemical stain is negative for H.pylori.    No intestinal metaplasia or dysplasia.     CTA C/A/P 7/14/25:  No evidence of aneurysm, dissection, thrombus, or stenosis.  Aberrant right subclavian artery.  Small spleen and possible remote splenic infarcts.  No    Abd U/S 6/9/25:  Unremarkable partially visualized pancreas.   Unremarkable liver parenchyma with normal contour. Right hepatic lobe measures 14.6 cm in long axis. No focal abnormality.   Patent portal vein-hepatopedal flow 38.3 cm/sec.    Normal biliary system, common bile duct-measures 5.2 mm  diameter.   Normal gallbladder size. No cholelithiasis, wall thickening, pericholecystic fluid.   Normal right kidney parenchyma, contour, measuring 11.0 cm in long axis.   No nephrolithiasis, hydronephrosis.   Spleen not able to be imaged secondary to patient wanting to end exam.   No ascites.     Assessment:  Nazanin Malone is a 45 y.o. female with a history of sickle cell disease w/ prior acute chest syndrome, DVT/PE, HTN, obesity, avascular necrosis of femur, opioid dependence who presented with chest pain that awoke her from sleep around 4 am.  Also with constipation - no BM in several days.    Plan:  - diet as tolerated  - continue PPI and carafate - can add pepcid 40 mg QHS  - would resume home lactulose and sennakot - if not helping, would add amitiza to help with OIC as the constipation may be contributing to epigastric/chest pressure  - outpatient colonoscopy for cancer screening and can consider repeat EGD at the same time, but unlikely to find anything new      Mariposa Shearer MD         [1]   Social History  Tobacco Use    Smoking status: Never    Smokeless tobacco: Never   Vaping Use    Vaping status: Never Used   Substance Use Topics    Alcohol use: No    Drug use: Yes     Types: Marijuana     Comment: periodically    [2]   Current Facility-Administered Medications:     0.9% NaCl infusion, , Intravenous, Continuous, Keyur Hernandez MD, Last Rate: 75 mL/hr at 07/16/25 1817, Rate Verify at 07/16/25 1817    acetaminophen tablet 1,000 mg, 1,000 mg, Oral, Q8H, Shlomo Aguirre MD, 1,000 mg at 07/17/25 0539    amLODIPine tablet 5 mg, 5 mg, Oral, Daily, Keyur Hernandez MD, 5 mg at 07/17/25 0833    aspirin tablet 325 mg, 325 mg, Oral, Daily, Shlomo Aguirre MD, 325 mg at 07/17/25 0830    cefTRIAXone injection 1 g, 1 g, Intravenous, Q24H, Keyur Hernandez MD, 1 g at 07/16/25 1716    dicyclomine capsule 10 mg, 10 mg, Oral, QID, Keyur Hernandez MD, 10 mg at 07/17/25 0829    diphenhydrAMINE injection 25  mg, 25 mg, Intravenous, Q3H PRN, Keyur Hernandez MD, 25 mg at 07/17/25 1135    FLUoxetine capsule 80 mg, 80 mg, Oral, Daily, Shlomo Aguirre MD, 80 mg at 07/17/25 0828    folic acid tablet 1 mg, 1 mg, Oral, Daily, Shlomo Aguirre MD, 1 mg at 07/17/25 0829    gabapentin capsule 600 mg, 600 mg, Oral, TID, Shlomo Aguirre MD, 600 mg at 07/17/25 0830    HYDROmorphone injection 1 mg, 1 mg, Intravenous, Q3H PRN, Keyur Hernandez MD, 1 mg at 07/17/25 1134    lactulose 20 gram/30 mL solution Soln 20 g, 20 g, Oral, TID, Keyur Hernandez MD    metoprolol tartrate (LOPRESSOR) tablet 25 mg, 25 mg, Oral, BID, Keyur Hernandez MD, 25 mg at 07/17/25 0829    morphine 12 hr tablet 30 mg, 30 mg, Oral, Q12H, Shlomo Aguirre MD, 30 mg at 07/17/25 0830    mupirocin 2 % ointment, , Nasal, BID, Keyru Hernandez MD, Given at 07/16/25 2019    naloxone 0.4 mg/mL injection 0.02 mg, 0.02 mg, Intravenous, PRN, Shlomo Aguirre MD    ondansetron injection 4 mg, 4 mg, Intravenous, Q8H PRN, Ana Philip MD, 4 mg at 07/17/25 0836    oxyCODONE-acetaminophen  mg per tablet 1 tablet, 1 tablet, Oral, Q4H PRN, Keyur Hernandez MD, 1 tablet at 07/17/25 0723    pantoprazole EC tablet 40 mg, 40 mg, Oral, BID, Keyur Hernandez MD, 40 mg at 07/17/25 0833    sodium chloride 0.9% flush 10 mL, 10 mL, Intravenous, PRN, Ana Philip MD    sucralfate 100 mg/mL suspension 1 g, 1 g, Oral, QID (AC & HS), Keyur Hernandez D., MD, 1 g at 07/17/25 7225

## 2025-07-17 NOTE — PROGRESS NOTES
Cannot complete a virtual visit while patient is admitted to the hospital.  Will request a reschedule.

## 2025-07-17 NOTE — PLAN OF CARE
ED to Hosp-Admission (Current) from 7/14/2025 in [facility] ([place])     7/16/2025 7/17/2025    [id number]   Medicare Message     Important Message from Medicare regarding Discharge Appeal Rights Explained to patient/caregiver; Signed/date by patient/caregiver Explained to patient/caregiver; Signed/date by patient/caregiver   Date IMM was signed 7/14/2025 7/17/2025   Time IMM was signed [id number]

## 2025-07-17 NOTE — PROGRESS NOTES
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 this morning.  Patient reports  shortness of the breath.  Patient took home pain medication without relief.  She reports her typical sickle cell pain crisis involves pain in her lower extremities.  She denies significant lower extremity pain currently.  She denies any fever, cough, recent sick contacts or travel.  She reports no relief despite receiving intravenous pain medication in the emergency department.    Overview/Hospital Course:  No notes on file    Interval History: No acute events overnight.  Appears comfortable and is tolerating diet without difficulty.  Reports nausea and still with abdominal / chest pain.  All questions answered and patient had no further complaints.    Objective:     Vital Signs (Most Recent):  Temp: 98.9 °F (37.2 °C) (07/17/25 1153)  Pulse: 75 (07/17/25 1153)  Resp: 16 (07/17/25 1303)  BP: (!) 141/104 (07/17/25 1153)  SpO2: (!) 93 % (07/17/25 1153) Vital Signs (24h Range):  Temp:  [98.2 °F (36.8 °C)-99.2 °F (37.3 °C)] 98.9 °F (37.2 °C)  Pulse:  [74-79] 75  Resp:  [16-18] 16  SpO2:  [93 %-97 %] 93 %  BP: (136-167)/() 141/104     Weight: 90.7 kg (199 lb 15.3 oz)  Body mass index is 36.57 kg/m².    Intake/Output Summary (Last  24 hours) at 7/17/2025 1333  Last data filed at 7/17/2025 0830  Gross per 24 hour   Intake 1113.16 ml   Output 900 ml   Net 213.16 ml         Physical Exam  Constitutional:       General: She is not in acute distress.     Appearance: She is obese. She is not ill-appearing or diaphoretic.   HENT:      Head: Normocephalic and atraumatic.      Nose: Nose normal.      Mouth/Throat:      Mouth: Mucous membranes are moist.   Eyes:      Extraocular Movements: Extraocular movements intact.   Neck:      Thyroid: No thyromegaly.      Vascular: No JVD.      Trachea: No tracheal deviation.   Cardiovascular:      Rate and Rhythm: Normal rate and regular rhythm.      Heart sounds: Normal heart sounds. No murmur heard.     No friction rub. No gallop.      Comments: Right chest wall port  Pulmonary:      Effort: Pulmonary effort is normal. No respiratory distress.      Breath sounds: Normal breath sounds. No stridor. No wheezing or rales.   Chest:      Chest wall: No tenderness.   Abdominal:      General: Bowel sounds are normal. There is no distension.      Palpations: Abdomen is soft. There is no mass.      Tenderness: There is no abdominal tenderness. There is no guarding or rebound.   Musculoskeletal:         General: No tenderness. Normal range of motion.      Cervical back: Normal range of motion and neck supple.   Skin:     General: Skin is warm and dry.      Coloration: Skin is not pale.      Findings: No erythema or rash.   Neurological:      Mental Status: She is alert and oriented to person, place, and time.      Cranial Nerves: No cranial nerve deficit.      Motor: No abnormal muscle tone.      Coordination: Coordination normal.      Deep Tendon Reflexes: Reflexes are normal and symmetric. Reflexes normal.   Psychiatric:         Mood and Affect: Mood is anxious.         Behavior: Behavior normal.         Thought Content: Thought content normal.         Judgment: Judgment normal.           Significant Labs: All pertinent  labs within the past 24 hours have been reviewed.    Significant Imaging: I have reviewed all pertinent imaging results/findings within the past 24 hours.      Assessment & Plan  Atypical chest pain  Vaso-occlusive pain due to sickle cell disease  Chronic gastritis  Chronic pain syndrome  Constipation  -Admitted to inpatient status  -Presented for evaluation of sudden onset of severe heavy pressure chest pain which woke her from sleep.  Pain is not typical of prior sickle cell pain crisis which she states usually involves lower extremity pain which she denies at this time.  Chart review does reveal that she has had chest pain during prior sickle cell pain crisis.    -CXR is clear with normal oxygen saturations which  speaks against an acute chest syndrome. -Reticulocyte count is normal.  Hb at baseline. T bili normal  -CTA chest ruled out PE and dissection  -Troponins negative.  Echocardiogram showed normal EF, grade 1 diastolic dysfunction and segmental wall motion abnormalities.  Cardiology consulted and input appreciated- this is unlikely to be cardiac in nature  -Noted history of chronic gastritis.  Reviewed EGD from 11/2024 showing gastritis and duodenitis with biopsies negative for malignancy and H.pylori  -Suspect her pain is secondary to gastritis / esophagitis and possible esophageal spasm - all in the context of likely opioid receptor hyperalgesia due to chronic need for opioid pain medications for her sickle cell anemia  -Noted hypotension and oversedation 7/15 which have resolved with IV fluids and lower doses of pain medications.  Today she appears comfortable and is eating without difficulty.  Hb is 9.7 and T bili remains normal.  Reports still with abdominal and chest pain.  No bowel movement since Monday  -Continue folic acid and decrease rate of IV fluids.  Continue home percocet.  Increase interval between iv dilaudid dose to q4h prn breakthrough pain only with hold parameters for hypotension or  oversedation.  So long as taking her oral pain medicatons, ok to receive iv dilaudid without waiting prolonged period.    -Continue bid PPI, carafate QID and dicyclomine qid.  Give GI cocktail again today.  Resume home lactulose and add colace  -Will consult GI for recommendations.  -Repeat labs in AM  -Hopeful to be able to discharge home tomorrow  Acute cystitis  -UA suggestive of possible UTI  -Urine culture pending  -Started ceftriaxone 7/16  Gram-positive cocci bacteremia  -Unclear why blood cultures were collected on admit  -Blood cultures growing Staph epidermidis - most likely a contamination from skin telly.  -No clinical signs of active infection at this time.  -If any signs of infection will repeat blood cultures  -No antibiotics at this time.  Hypertension  Hypotension  -BP mildly elevated on admit and on 7/15 SBP dropped into low 80s.  BP improved after fluid bolus.  Today bp is moderately elevated  -Continue hold parameters on metoprolol and IV dilaudid  -Increase norvasc to home dose of 10mg qd  -Monitor closely.  Obesity  Body mass index is 36.57 kg/m². Morbid obesity complicates all aspects of disease management from diagnostic modalities to treatment. Weight loss encouraged and health benefits explained to patient.  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    -INR remains 3.3 today  -Hold warfarin today  -Check daily pt/INR  VTE Risk Mitigation (From admission, onward)           Ordered     IP VTE HIGH RISK PATIENT  Once         07/14/25 0941     Place JOSE LUIS hose  Until discontinued         07/14/25 0930     Place sequential compression device  Until discontinued         07/14/25 0930     Reason for No Pharmacological VTE Prophylaxis  Once        Question:  Reasons:  Answer:  Already adequately anticoagulated on oral Anticoagulants    07/14/25 0930                     Discharge Planning   ALYSE: 7/17/2025     Code Status: Full Code   Medical Readiness for Discharge Date:   Discharge Plan A: Home with family                        Keyur Hernandez MD  Department of Hospital Medicine   [facility] ([place])

## 2025-07-17 NOTE — SUBJECTIVE & OBJECTIVE
Interval History: No acute events overnight.  Appears comfortable and is tolerating diet without difficulty.  Reports nausea and still with abdominal / chest pain.  All questions answered and patient had no further complaints.    Objective:     Vital Signs (Most Recent):  Temp: 98.9 °F (37.2 °C) (07/17/25 1153)  Pulse: 75 (07/17/25 1153)  Resp: 16 (07/17/25 1303)  BP: (!) 141/104 (07/17/25 1153)  SpO2: (!) 93 % (07/17/25 1153) Vital Signs (24h Range):  Temp:  [98.2 °F (36.8 °C)-99.2 °F (37.3 °C)] 98.9 °F (37.2 °C)  Pulse:  [74-79] 75  Resp:  [16-18] 16  SpO2:  [93 %-97 %] 93 %  BP: (136-167)/() 141/104     Weight: 90.7 kg (199 lb 15.3 oz)  Body mass index is 36.57 kg/m².    Intake/Output Summary (Last 24 hours) at 7/17/2025 1333  Last data filed at 7/17/2025 0830  Gross per 24 hour   Intake 1113.16 ml   Output 900 ml   Net 213.16 ml         Physical Exam  Constitutional:       General: She is not in acute distress.     Appearance: She is obese. She is not ill-appearing or diaphoretic.   HENT:      Head: Normocephalic and atraumatic.      Nose: Nose normal.      Mouth/Throat:      Mouth: Mucous membranes are moist.   Eyes:      Extraocular Movements: Extraocular movements intact.   Neck:      Thyroid: No thyromegaly.      Vascular: No JVD.      Trachea: No tracheal deviation.   Cardiovascular:      Rate and Rhythm: Normal rate and regular rhythm.      Heart sounds: Normal heart sounds. No murmur heard.     No friction rub. No gallop.      Comments: Right chest wall port  Pulmonary:      Effort: Pulmonary effort is normal. No respiratory distress.      Breath sounds: Normal breath sounds. No stridor. No wheezing or rales.   Chest:      Chest wall: No tenderness.   Abdominal:      General: Bowel sounds are normal. There is no distension.      Palpations: Abdomen is soft. There is no mass.      Tenderness: There is no abdominal tenderness. There is no guarding or rebound.   Musculoskeletal:         General: No  tenderness. Normal range of motion.      Cervical back: Normal range of motion and neck supple.   Skin:     General: Skin is warm and dry.      Coloration: Skin is not pale.      Findings: No erythema or rash.   Neurological:      Mental Status: She is alert and oriented to person, place, and time.      Cranial Nerves: No cranial nerve deficit.      Motor: No abnormal muscle tone.      Coordination: Coordination normal.      Deep Tendon Reflexes: Reflexes are normal and symmetric. Reflexes normal.   Psychiatric:         Mood and Affect: Mood is anxious.         Behavior: Behavior normal.         Thought Content: Thought content normal.         Judgment: Judgment normal.           Significant Labs: All pertinent labs within the past 24 hours have been reviewed.    Significant Imaging: I have reviewed all pertinent imaging results/findings within the past 24 hours.

## 2025-07-17 NOTE — ASSESSMENT & PLAN NOTE
-Admitted to inpatient status  -Presented for evaluation of sudden onset of severe heavy pressure chest pain which woke her from sleep.  Pain is not typical of prior sickle cell pain crisis which she states usually involves lower extremity pain which she denies at this time.  Chart review does reveal that she has had chest pain during prior sickle cell pain crisis.    -CXR is clear with normal oxygen saturations which  speaks against an acute chest syndrome. -Reticulocyte count is normal.  Hb at baseline. T bili normal  -CTA chest ruled out PE and dissection  -Troponins negative.  Echocardiogram showed normal EF, grade 1 diastolic dysfunction and segmental wall motion abnormalities.  Cardiology consulted and input appreciated- this is unlikely to be cardiac in nature  -Noted history of chronic gastritis.  Reviewed EGD from 11/2024 showing gastritis and duodenitis with biopsies negative for malignancy and H.pylori  -Suspect her pain is secondary to gastritis / esophagitis and possible esophageal spasm - all in the context of likely opioid receptor hyperalgesia due to chronic need for opioid pain medications for her sickle cell anemia  -Noted hypotension and oversedation 7/15 which have resolved with IV fluids and lower doses of pain medications.  Today she appears comfortable and is eating without difficulty.  Hb is 9.7 and T bili remains normal.  Reports still with abdominal and chest pain.  No bowel movement since Monday  -Continue folic acid and decrease rate of IV fluids.  Continue home percocet.  Increase interval between iv dilaudid dose to q4h prn breakthrough pain only with hold parameters for hypotension or oversedation.  So long as taking her oral pain medicatons, ok to receive iv dilaudid without waiting prolonged period.    -Continue bid PPI, carafate QID and dicyclomine qid.  Give GI cocktail again today.  Resume home lactulose and add colace  -Will consult GI for recommendations.  -Repeat labs in  AM  -Hopeful to be able to discharge home tomorrow

## 2025-07-17 NOTE — TELEPHONE ENCOUNTER
Copied from CRM #[id number]. Topic: Medications - Medication Refill  >> Jul 17, 2025  9:16 AM Kathy wrote:  Type:  RX Refill Request    Who Called: Nazanin Encalade    Refill or New Rx:refill  RX Name and Strength:oxyCODONE-acetaminophen (PERCOCET)  mg per tablet    [facility] with phone number[facility]   [facility]    Local or Mail Order: local  Ordering Provider:Zachariah  Would the patient rather a call back or a response via [facility]? [phone]    Best Call Back Number:[phone]

## 2025-07-17 NOTE — ASSESSMENT & PLAN NOTE
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    -INR remains 3.3 today  -Hold warfarin today  -Check daily pt/INR

## 2025-07-17 NOTE — ASSESSMENT & PLAN NOTE
-BP mildly elevated on admit and on 7/15 SBP dropped into low 80s.  BP improved after fluid bolus.  Today bp is moderately elevated  -Continue hold parameters on metoprolol and IV dilaudid  -Increase norvasc to home dose of 10mg qd  -Monitor closely.

## 2025-07-17 NOTE — PLAN OF CARE
Plan of care reviewed with patient. Pain managed with PRN pain medications. Patient without any further needs at this time. Will continue to observe and note any changes as they occur.       Problem: Adult Inpatient Plan of Care  Goal: Plan of Care Review  Outcome: Progressing  Goal: Patient-Specific Goal (Individualized)  Outcome: Progressing  Goal: Absence of Hospital-Acquired Illness or Injury  Outcome: Progressing  Goal: Optimal Comfort and Wellbeing  Outcome: Progressing  Goal: Readiness for Transition of Care  Outcome: Progressing     Problem: Infection  Goal: Absence of Infection Signs and Symptoms  Outcome: Progressing     Problem: Pain Acute  Goal: Optimal Pain Control and Function  Outcome: Progressing     Problem: Sickle Cell Disease  Goal: Optimal Cerebral Tissue Perfusion  Outcome: Progressing  Goal: Optimal Coping with Sickle Cell Disease  Outcome: Progressing  Goal: Effective Tissue Perfusion  Outcome: Progressing  Goal: Absence of Infection Signs and Symptoms  Outcome: Progressing  Goal: Optimal Pain Control and Function  Outcome: Progressing  Goal: Optimal Oxygenation  Outcome: Progressing     Problem: Fall Injury Risk  Goal: Absence of Fall and Fall-Related Injury  Outcome: Progressing

## 2025-07-18 VITALS
RESPIRATION RATE: 16 BRPM | WEIGHT: 199.94 LBS | SYSTOLIC BLOOD PRESSURE: 158 MMHG | TEMPERATURE: 98 F | OXYGEN SATURATION: 95 % | BODY MASS INDEX: 36.79 KG/M2 | DIASTOLIC BLOOD PRESSURE: 92 MMHG | HEART RATE: 79 BPM | HEIGHT: 62 IN

## 2025-07-18 DIAGNOSIS — D57.00 HB-SS DISEASE WITH VASO-OCCLUSIVE PAIN: ICD-10-CM

## 2025-07-18 DIAGNOSIS — M17.11 PRIMARY OSTEOARTHRITIS OF RIGHT KNEE: ICD-10-CM

## 2025-07-18 LAB
ABSOLUTE EOSINOPHIL (OHS): 0.4 K/UL
ABSOLUTE MONOCYTE (OHS): 0.5 K/UL (ref 0.3–1)
ABSOLUTE NEUTROPHIL COUNT (OHS): 2.89 K/UL (ref 1.8–7.7)
ALBUMIN SERPL BCP-MCNC: 3.5 G/DL (ref 3.5–5.2)
ALP SERPL-CCNC: 88 UNIT/L (ref 40–150)
ALT SERPL W/O P-5'-P-CCNC: 12 UNIT/L (ref 10–44)
ANION GAP (OHS): 10 MMOL/L (ref 8–16)
AST SERPL-CCNC: 24 UNIT/L (ref 11–45)
BASOPHILS # BLD AUTO: 0.04 K/UL
BASOPHILS NFR BLD AUTO: 0.5 %
BILIRUB SERPL-MCNC: 0.3 MG/DL (ref 0.1–1)
BUN SERPL-MCNC: 12 MG/DL (ref 6–20)
CALCIUM SERPL-MCNC: 9.1 MG/DL (ref 8.7–10.5)
CHLORIDE SERPL-SCNC: 107 MMOL/L (ref 95–110)
CO2 SERPL-SCNC: 25 MMOL/L (ref 23–29)
CREAT SERPL-MCNC: 1.1 MG/DL (ref 0.5–1.4)
ERYTHROCYTE [DISTWIDTH] IN BLOOD BY AUTOMATED COUNT: 25.3 % (ref 11.5–14.5)
GFR SERPLBLD CREATININE-BSD FMLA CKD-EPI: >60 ML/MIN/1.73/M2
GLUCOSE SERPL-MCNC: 80 MG/DL (ref 70–110)
HCT VFR BLD AUTO: 31.7 % (ref 37–48.5)
HGB BLD-MCNC: 10.4 GM/DL (ref 12–16)
IMM GRANULOCYTES # BLD AUTO: 0.01 K/UL (ref 0–0.04)
IMM GRANULOCYTES NFR BLD AUTO: 0.1 % (ref 0–0.5)
INR PPP: 2 (ref 0.8–1.2)
LYMPHOCYTES # BLD AUTO: 3.61 K/UL (ref 1–4.8)
MAGNESIUM SERPL-MCNC: 1.8 MG/DL (ref 1.6–2.6)
MCH RBC QN AUTO: 22.7 PG (ref 27–31)
MCHC RBC AUTO-ENTMCNC: 32.8 G/DL (ref 32–36)
MCV RBC AUTO: 69 FL (ref 82–98)
NUCLEATED RBC (/100WBC) (OHS): 1 /100 WBC
PLATELET # BLD AUTO: 471 K/UL (ref 150–450)
PMV BLD AUTO: 9.2 FL (ref 9.2–12.9)
POTASSIUM SERPL-SCNC: 3.5 MMOL/L (ref 3.5–5.1)
PROT SERPL-MCNC: 7.7 GM/DL (ref 6–8.4)
PROTHROMBIN TIME: 21.1 SECONDS (ref 9–12.5)
RBC # BLD AUTO: 4.58 M/UL (ref 4–5.4)
RELATIVE EOSINOPHIL (OHS): 5.4 %
RELATIVE LYMPHOCYTE (OHS): 48.5 % (ref 18–48)
RELATIVE MONOCYTE (OHS): 6.7 % (ref 4–15)
RELATIVE NEUTROPHIL (OHS): 38.8 % (ref 38–73)
SODIUM SERPL-SCNC: 142 MMOL/L (ref 136–145)
WBC # BLD AUTO: 7.45 K/UL (ref 3.9–12.7)

## 2025-07-18 PROCEDURE — 25000003 PHARM REV CODE 250: Performed by: HOSPITALIST

## 2025-07-18 PROCEDURE — 80053 COMPREHEN METABOLIC PANEL: CPT | Performed by: HOSPITALIST

## 2025-07-18 PROCEDURE — 85610 PROTHROMBIN TIME: CPT | Performed by: HOSPITALIST

## 2025-07-18 PROCEDURE — 63600175 PHARM REV CODE 636 W HCPCS: Performed by: HOSPITALIST

## 2025-07-18 PROCEDURE — 85025 COMPLETE CBC W/AUTO DIFF WBC: CPT | Performed by: HOSPITALIST

## 2025-07-18 PROCEDURE — 83735 ASSAY OF MAGNESIUM: CPT | Performed by: HOSPITALIST

## 2025-07-18 RX ORDER — METOPROLOL TARTRATE 25 MG/1
25 TABLET, FILM COATED ORAL 2 TIMES DAILY
Qty: 60 TABLET | Refills: 1 | Status: SHIPPED | OUTPATIENT
Start: 2025-07-18

## 2025-07-18 RX ORDER — SUCRALFATE 1 G/10ML
1 SUSPENSION ORAL
Qty: 1200 ML | Refills: 0 | Status: SHIPPED | OUTPATIENT
Start: 2025-07-18

## 2025-07-18 RX ORDER — MORPHINE SULFATE 30 MG/1
30 TABLET, FILM COATED, EXTENDED RELEASE ORAL EVERY 12 HOURS
Start: 2025-07-18 | End: 2025-07-22

## 2025-07-18 RX ORDER — CEFTRIAXONE 1 G/1
1 INJECTION, POWDER, FOR SOLUTION INTRAMUSCULAR; INTRAVENOUS
Status: DISCONTINUED | OUTPATIENT
Start: 2025-07-18 | End: 2025-07-18 | Stop reason: HOSPADM

## 2025-07-18 RX ORDER — FAMOTIDINE 40 MG/1
40 TABLET, FILM COATED ORAL 2 TIMES DAILY
Qty: 60 TABLET | Refills: 1 | Status: SHIPPED | OUTPATIENT
Start: 2025-07-18 | End: 2026-07-18

## 2025-07-18 RX ORDER — LACTULOSE 10 G/15ML
15 SOLUTION ORAL; RECTAL 3 TIMES DAILY
Qty: 2070 ML | Refills: 1 | Status: SHIPPED | OUTPATIENT
Start: 2025-07-18

## 2025-07-18 RX ADMIN — DIPHENHYDRAMINE HYDROCHLORIDE 25 MG: 50 INJECTION INTRAMUSCULAR; INTRAVENOUS at 07:07

## 2025-07-18 RX ADMIN — PANTOPRAZOLE SODIUM 40 MG: 40 TABLET, DELAYED RELEASE ORAL at 08:07

## 2025-07-18 RX ADMIN — CEFTRIAXONE 1 G: 1 INJECTION, POWDER, FOR SOLUTION INTRAMUSCULAR; INTRAVENOUS at 08:07

## 2025-07-18 RX ADMIN — GABAPENTIN 600 MG: 300 CAPSULE ORAL at 03:07

## 2025-07-18 RX ADMIN — DIPHENHYDRAMINE HYDROCHLORIDE 25 MG: 50 INJECTION INTRAMUSCULAR; INTRAVENOUS at 03:07

## 2025-07-18 RX ADMIN — DOCUSATE SODIUM 100 MG: 100 CAPSULE, LIQUID FILLED ORAL at 08:07

## 2025-07-18 RX ADMIN — AMLODIPINE BESYLATE 10 MG: 5 TABLET ORAL at 08:07

## 2025-07-18 RX ADMIN — OXYCODONE AND ACETAMINOPHEN 1 TABLET: 325; 10 TABLET ORAL at 04:07

## 2025-07-18 RX ADMIN — GABAPENTIN 600 MG: 300 CAPSULE ORAL at 08:07

## 2025-07-18 RX ADMIN — DICYCLOMINE HYDROCHLORIDE 10 MG: 10 CAPSULE ORAL at 08:07

## 2025-07-18 RX ADMIN — DIPHENHYDRAMINE HYDROCHLORIDE 25 MG: 50 INJECTION INTRAMUSCULAR; INTRAVENOUS at 02:07

## 2025-07-18 RX ADMIN — FLUOXETINE HYDROCHLORIDE 80 MG: 20 CAPSULE ORAL at 08:07

## 2025-07-18 RX ADMIN — FOLIC ACID 1 MG: 1 TABLET ORAL at 08:07

## 2025-07-18 RX ADMIN — HYDROMORPHONE HYDROCHLORIDE 1 MG: 1 INJECTION, SOLUTION INTRAMUSCULAR; INTRAVENOUS; SUBCUTANEOUS at 07:07

## 2025-07-18 RX ADMIN — OXYCODONE AND ACETAMINOPHEN 1 TABLET: 325; 10 TABLET ORAL at 12:07

## 2025-07-18 RX ADMIN — DIPHENHYDRAMINE HYDROCHLORIDE 25 MG: 50 INJECTION INTRAMUSCULAR; INTRAVENOUS at 11:07

## 2025-07-18 RX ADMIN — HYDROMORPHONE HYDROCHLORIDE 1 MG: 1 INJECTION, SOLUTION INTRAMUSCULAR; INTRAVENOUS; SUBCUTANEOUS at 11:07

## 2025-07-18 RX ADMIN — SUCRALFATE ORAL 1 G: 1 SUSPENSION ORAL at 11:07

## 2025-07-18 RX ADMIN — DICYCLOMINE HYDROCHLORIDE 10 MG: 10 CAPSULE ORAL at 12:07

## 2025-07-18 RX ADMIN — ASPIRIN 325 MG: 325 TABLET ORAL at 08:07

## 2025-07-18 RX ADMIN — METOPROLOL TARTRATE 25 MG: 25 TABLET, FILM COATED ORAL at 08:07

## 2025-07-18 RX ADMIN — OXYCODONE AND ACETAMINOPHEN 1 TABLET: 325; 10 TABLET ORAL at 08:07

## 2025-07-18 RX ADMIN — HYDROMORPHONE HYDROCHLORIDE 1 MG: 1 INJECTION, SOLUTION INTRAMUSCULAR; INTRAVENOUS; SUBCUTANEOUS at 03:07

## 2025-07-18 RX ADMIN — MORPHINE SULFATE 30 MG: 30 TABLET, FILM COATED, EXTENDED RELEASE ORAL at 08:07

## 2025-07-18 RX ADMIN — HYDROMORPHONE HYDROCHLORIDE 1 MG: 1 INJECTION, SOLUTION INTRAMUSCULAR; INTRAVENOUS; SUBCUTANEOUS at 02:07

## 2025-07-18 NOTE — PLAN OF CARE
"Case Management Final Discharge Note    Discharge Disposition: home with family    New DME ordered / company name: none    Relevant SDOH / Transition of Care Barriers:  underinsured     Person available to provide assistance at home when needed and their contact information: children    Scheduled followup appointment: CM was unable tot schedule appointments due to patients current insurance. CM offered to send referrals to [facility] but patient declined and stated "my insurance will switch on August 1 so I will schedule them myself"    Referrals placed: GI    Transportation: patient will need a lyft home        Patient and family educated on discharge services and updated on DC plan. Bedside RN notified, patient clear to discharge from Case Management Perspective.    [facility] ([place])  Discharge Final Note    Primary Care Provider: Akanksha Snyder MD    Expected Discharge Date: 7/18/2025    Final Discharge Note (most recent)       Final Note - 07/18/25 1200          Final Note    Assessment Type Final Discharge Note (P)      Anticipated Discharge Disposition Home or Self Care (P)      What phone number can be called within the next 1-3 days to see how you are doing after discharge? [phone] (P)      Hospital Resources/Appts/Education Provided Provided patient/caregiver with written discharge plan information;Patient refused appointment set-up;Appointment suggestion unavailable (P)         Post-Acute Status    Discharge Delays None known at this time (P)                      Important Message from Medicare  Important Message from Medicare regarding Discharge Appeal Rights: Explained to patient/caregiver, Signed/date by patient/caregiver     Date IMM was signed: 07/17/25  Time IMM was signed: 0932    Contact Info       Mariposa Shearer MD   Specialty: Gastroenterology    [address] [address]   Phone: [phone]       Next Steps: Follow up            "

## 2025-07-18 NOTE — ASSESSMENT & PLAN NOTE
-Admitted to inpatient status  -Presented for evaluation of sudden onset of severe heavy pressure chest pain which woke her from sleep.  Pain is not typical of prior sickle cell pain crisis which she states usually involves lower extremity pain which she denies at this time.  Chart review does reveal that she has had chest pain during prior sickle cell pain crisis.    -CXR is clear with normal oxygen saturations which  speaks against an acute chest syndrome. -Reticulocyte count is normal.  Hb at baseline. T bili normal  -CTA chest ruled out PE and dissection  -Troponins negative.  Echocardiogram showed normal EF, grade 1 diastolic dysfunction and segmental wall motion abnormalities.  Cardiology consulted and input appreciated- this is unlikely to be cardiac in nature  -Noted history of chronic gastritis.  Reviewed EGD from 11/2024 showing gastritis and duodenitis with biopsies negative for malignancy and H.pylori  -Suspect her pain is secondary to gastritis / esophagitis and possible esophageal spasm - all in the context of likely opioid receptor hyperalgesia due to chronic need for opioid pain medications for her sickle cell anemia  -Noted hypotension and oversedation 7/15 which have resolved with IV fluids and lower doses of pain medications.    -GI consulted and input appreciated.    -She has been treated with folic acid, IV fluids, home percocet, IV dilaudid, bid PPI, famotidine, carafate, dicyclomine, GI cocktail, lactulose and colace  -She was seen and examined today and reports that the addition of famotidine made a giant improvement.  She is feeling better and requesting to go home today which is safe and appropriate  -Continue with bid famotidine, daily PPI, carafate, dicyclomine and bowel regimen.  Continue home pain medications per hematology.  Recommend follow-up with pcp within 1 week and GI within 2-4 weeks and her hematologist within 1 month.

## 2025-07-18 NOTE — ASSESSMENT & PLAN NOTE
-Unclear why blood cultures were collected on admit  -Blood cultures growing Staph epidermidis - most likely a contamination from skin telly.  -No clinical signs of active systemic infection.

## 2025-07-18 NOTE — PROGRESS NOTES
"Gastroenterology Progress Note    Reason for Consult: epigastric and chest pain    Subjective:  She was given pepcid last night which she thinks really helped with her discomfort.  She also got her regular bowel regimen and had 3 Bms.  Feels much better and wants to go home.    ROS:  Gen: no F/C  CV: no CP/palpitations  Resp: no SOB/wheezing    Physical Exam  BP (!) 137/98 (BP Location: Left arm, Patient Position: Lying)   Pulse 78   Temp 98.5 °F (36.9 °C) (Oral)   Resp 18   Ht 5' 2" (1.575 m)   Wt 90.7 kg (199 lb 15.3 oz)   LMP  (LMP Unknown)   SpO2 95%   Breastfeeding No   BMI 36.57 kg/m²   General: Awake, alert female in no apparent distress  HEENT: NC/AT, PERRL, EOMI, MMM  CV: RRR, without murmur, gallop, or rubs  Pulm: CTAB, no wheezing, rales, or rhonchi  GI: soft, NT/ND, +BS  MSK: no edema and normal ROM  Neuro: A&Ox3, moves all extremities equally, sensation grossly intact  Skin: no rashes or lesions  Psych: normal mood and affect    Medications/Infusions:  Current Medications[1]    Intake and Output:    Intake/Output Summary (Last 24 hours) at 7/18/2025 0832  Last data filed at 7/18/2025 0645  Gross per 24 hour   Intake 1863.08 ml   Output 1500 ml   Net 363.08 ml       Labs:  Lab Results   Component Value Date/Time    WBC 7.45 07/18/2025 04:45 AM    HGB 10.4 (L) 07/18/2025 04:45 AM    HGB 10.2 (L) 06/11/2025 06:23 AM    HGB 8.1 (L) 03/14/2025 04:25 AM    HCT 31.7 (L) 07/18/2025 04:45 AM    HCT 32.1 (L) 06/11/2025 06:23 AM    HCT 38.5 05/20/2025 01:31 PM     (H) 07/18/2025 04:45 AM     03/14/2025 04:25 AM    MCV 69 (L) 07/18/2025 04:45 AM    MCV 77.2 (L) 06/11/2025 06:23 AM    MCV 72 (L) 03/14/2025 04:25 AM     07/18/2025 04:45 AM     06/11/2025 06:23 AM     03/14/2025 04:25 AM    K 3.5 07/18/2025 04:45 AM    K 3.5 (L) 06/11/2025 06:23 AM    K 4.1 03/14/2025 04:25 AM     07/18/2025 04:45 AM     03/14/2025 04:25 AM    CO2 25 07/18/2025 04:45 AM    CO2 27 " 06/11/2025 06:23 AM    CO2 26 03/14/2025 04:25 AM    BUN 12 07/18/2025 04:45 AM    CREATININE 1.1 07/18/2025 04:45 AM    GLU 80 07/18/2025 04:45 AM    GLU 78 03/14/2025 04:25 AM    CALCIUM 9.1 07/18/2025 04:45 AM    CALCIUM 9.1 06/11/2025 06:23 AM    CALCIUM 9.0 03/14/2025 04:25 AM    MG 1.8 07/18/2025 04:45 AM    PHOS 3.8 07/15/2025 04:22 AM    PHOS 3.1 03/14/2025 04:25 AM    INR 2.0 (H) 07/18/2025 04:45 AM    INR 1.5 (H) 06/11/2025 06:23 AM    INR 1.0 02/10/2025 08:59 AM    APTT 28.7 07/14/2025 07:31 AM    APTT 46.0 (H) 06/06/2025 01:09 PM    APTT 56.7 (H) 08/16/2024 03:11 AM   ]  Lab Results   Component Value Date/Time    PROT 7.7 07/18/2025 04:45 AM    PROT 8.3 03/11/2025 09:13 AM    ALBUMIN 3.5 07/18/2025 04:45 AM    ALBUMIN 3.7 06/11/2025 06:23 AM    ALBUMIN 3.8 03/11/2025 09:13 AM    BILITOT 0.3 07/18/2025 04:45 AM    BILITOT 0.5 06/11/2025 06:23 AM    BILITOT 0.8 03/11/2025 09:13 AM    BILIDIR 0.1 01/19/2020 05:33 AM    AST 24 07/18/2025 04:45 AM    AST 19 06/11/2025 06:23 AM    AST 22 03/11/2025 09:13 AM    ALT 12 07/18/2025 04:45 AM    ALT 12 06/11/2025 06:23 AM    ALT 16 03/11/2025 09:13 AM    ALKPHOS 88 07/18/2025 04:45 AM    ALKPHOS 97 03/11/2025 09:13 AM   ]    Imaging and Procedures:  Labs and imaging results were reviewed.  None new    Assessment:  Nazanin Malone is a 45 y.o. female with a history of  sickle cell disease w/ prior acute chest syndrome, DVT/PE, HTN, obesity, avascular necrosis of femur, opioid dependence who presented with chest pain that awoke her from sleep around 4 am.  Also with constipation - no BM in several days.     Plan:  - diet as tolerated  - continue PPI and carafate - can discharge with pepcid 40 mg QHS - told her if she finds this much more helpful than PPI, could also stop the PPI and just do pepcid BID  - continue home lactulose and senokot  - discussed w/ pt that there are meds designed specifically for OIC and that if her lactulose senokot regimen is failing, she  can follow up with us in clinic to start one of those meds (amitiza, relistor)   - outpatient colonoscopy for cancer screening and can consider repeat EGD at the same time, but unlikely to find anything new  - GI clinic follow up in 1 month  - call back if needed prior to discharge      Mariposa Shearer MD         [1]   Current Facility-Administered Medications   Medication Dose Route Frequency Provider Last Rate Last Admin    0.9% NaCl infusion   Intravenous Continuous Keyur Hernandez MD 50 mL/hr at 07/17/25 1851 Rate Verify at 07/17/25 1851    acetaminophen tablet 1,000 mg  1,000 mg Oral Q8H Shlomo Aguirre MD   1,000 mg at 07/17/25 2208    amLODIPine tablet 10 mg  10 mg Oral Daily Keyur Hernandez MD        aspirin tablet 325 mg  325 mg Oral Daily Shlomo Aguirre MD   325 mg at 07/17/25 0830    cefTRIAXone injection 1 g  1 g Intravenous Q24H Keyur Hernandez MD   1 g at 07/17/25 1638    dicyclomine capsule 10 mg  10 mg Oral QID Keyur Hernandez MD   10 mg at 07/17/25 2208    diphenhydrAMINE injection 25 mg  25 mg Intravenous Q3H PRN Keyur Hernandez MD   25 mg at 07/18/25 0727    docusate sodium capsule 100 mg  100 mg Oral BID Keyur Hernandez MD   100 mg at 07/17/25 2208    famotidine tablet 40 mg  40 mg Oral QHS Mariposa Shearer MD   40 mg at 07/17/25 2208    FLUoxetine capsule 80 mg  80 mg Oral Daily Shlomo Aguirre MD   80 mg at 07/17/25 0828    folic acid tablet 1 mg  1 mg Oral Daily Slhomo Aguirre MD   1 mg at 07/17/25 0829    gabapentin capsule 600 mg  600 mg Oral TID Shlomo Aguirre MD   600 mg at 07/17/25 2208    hydrALAZINE injection 15 mg  15 mg Intravenous Q4H PRN Keyur Hernandez MD   15 mg at 07/17/25 1753    HYDROmorphone injection 1 mg  1 mg Intravenous Q4H PRN Keyur Hernandez MD   1 mg at 07/18/25 0727    lactulose 20 gram/30 mL solution Soln 20 g  20 g Oral TID Keyur Hernandez MD   20 g at 07/17/25 2208    metoprolol tartrate (LOPRESSOR) tablet 25 mg  25 mg Oral BID Keyur Hernandez  MD DG   25 mg at 07/17/25 2208    morphine 12 hr tablet 30 mg  30 mg Oral Q12H Shlomo Aguirre MD   30 mg at 07/17/25 2208    mupirocin 2 % ointment   Nasal BID Keyur Hernandez MD   Given at 07/16/25 2019    naloxone 0.4 mg/mL injection 0.02 mg  0.02 mg Intravenous PRN Shlomo Aguirre MD        ondansetron injection 4 mg  4 mg Intravenous Q8H PRN Ana Philip MD   4 mg at 07/17/25 0836    oxyCODONE-acetaminophen  mg per tablet 1 tablet  1 tablet Oral Q4H PRN Keyur Hernandez MD   1 tablet at 07/18/25 0432    pantoprazole EC tablet 40 mg  40 mg Oral BID Keyur Hernandez MD   40 mg at 07/17/25 2208    prochlorperazine injection Soln 2.5 mg  2.5 mg Intravenous Q6H PRN Keyur Hernandez MD   2.5 mg at 07/17/25 1348    senna tablet 17.2 mg  17.2 mg Oral QHS GuiderMariposa MD   17.2 mg at 07/17/25 2208    sodium chloride 0.9% flush 10 mL  10 mL Intravenous PRN Ana Philip MD   10 mL at 07/17/25 2236    sucralfate 100 mg/mL suspension 1 g  1 g Oral QID (AC & HS) Keyur Hernandez MD   1 g at 07/17/25 2208

## 2025-07-18 NOTE — ASSESSMENT & PLAN NOTE
-UA suggestive of possible UTI  -Urine culture growing low count of GNR  -Completed three days of ceftriaxone therapy

## 2025-07-18 NOTE — PLAN OF CARE
VN reviewed discharge instructions with pt. Using teach back method.  AVS printed and handed to pt by bedside nurse.  Reviewed follow-up appointments, medications, diet, and importance of medication compliance.  Reviewed home care instructions, treatment plan, self-management, and when to seek medical attention.  Allowed time for questions.  All questions answered.  Patient verbalized complete understanding of discharge instructions and voices no concerns.     Discharge instructions complete.  Pt waiting on Bedside delivery and ride home..    Bedside nurse notified.

## 2025-07-18 NOTE — TELEPHONE ENCOUNTER
Copied from CRM #[id number]. Topic: Medications - Medication Refill  >> Jul 18, 2025  4:20 PM Violette wrote:  Rx Name:  oxyCODONE-acetaminophen  mg per tablet 1 tablet       Preferred Pharmacy with number:     [facility]  [address]  [facility] [address]  Phone: [phone] Fax: [phone]      Ordering Provider:  Vijaya Soni MD      Contact preference:  [phone]    Comments/Notes:  Requesting refill pt has been discharged trying to get meds before clinic closes for weekend

## 2025-07-18 NOTE — ASSESSMENT & PLAN NOTE
-BP is reasonably controlled to slightly elevated now.  Noted that on 7/15 SBP dropped into low 80s.  BP improved after fluid bolus.    -Continue home norvasc, metoprolol and prazosin
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
## 2025-07-18 NOTE — DISCHARGE SUMMARY
[facility]  Discharge Summary      Patient Name: Nazanin Malone  MRN: [id number]  VLADIMIR: [id number]  Patient Class: IP- Inpatient  Admission Date: 7/14/2025  Hospital Length of Stay: 4 days  Discharge Date and Time: No discharge date for patient encounter.  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 this morning.  Patient reports  shortness of the breath.  Patient took home pain medication without relief.  She reports her typical sickle cell pain crisis involves pain in her lower extremities.  She denies significant lower extremity pain currently.  She denies any fever, cough, recent sick contacts or travel.  She reports no relief despite receiving intravenous pain medication in the emergency department.    Goals of Care Treatment Preferences:  Code Status: Full Code         Consults:   Consults (From admission, onward)          Status Ordering Provider     Inpatient consult to Gastroenterology  Once        Provider:  Mariposa Shearer MD    Completed JESSICA HERNANDEZ     Inpatient consult to Cardiology  Once        Provider:  Ander Fletcher MD    Completed LUIS MULLINS          Hospital Course By Problem:   Assessment & Plan  Atypical chest pain  Vaso-occlusive pain due to sickle cell disease  Chronic gastritis  Chronic pain syndrome  Constipation  -Admitted to inpatient status  -Presented for evaluation of sudden onset of severe heavy pressure  chest pain which woke her from sleep.  Pain is not typical of prior sickle cell pain crisis which she states usually involves lower extremity pain which she denies at this time.  Chart review does reveal that she has had chest pain during prior sickle cell pain crisis.    -CXR is clear with normal oxygen saturations which  speaks against an acute chest syndrome. -Reticulocyte count is normal.  Hb at baseline. T bili normal  -CTA chest ruled out PE and dissection  -Troponins negative.  Echocardiogram showed normal EF, grade 1 diastolic dysfunction and segmental wall motion abnormalities.  Cardiology consulted and input appreciated- this is unlikely to be cardiac in nature  -Noted history of chronic gastritis.  Reviewed EGD from 11/2024 showing gastritis and duodenitis with biopsies negative for malignancy and H.pylori  -Suspect her pain is secondary to gastritis / esophagitis and possible esophageal spasm - all in the context of likely opioid receptor hyperalgesia due to chronic need for opioid pain medications for her sickle cell anemia  -Noted hypotension and oversedation 7/15 which have resolved with IV fluids and lower doses of pain medications.    -GI consulted and input appreciated.    -She has been treated with folic acid, IV fluids, home percocet, IV dilaudid, bid PPI, famotidine, carafate, dicyclomine, GI cocktail, lactulose and colace  -She was seen and examined today and reports that the addition of famotidine made a giant improvement.  She is feeling better and requesting to go home today which is safe and appropriate  -Continue with bid famotidine, daily PPI, carafate, dicyclomine and bowel regimen.  Continue home pain medications per hematology.  Recommend follow-up with pcp within 1 week and GI within 2-4 weeks and her hematologist within 1 month.  Acute cystitis  -UA suggestive of possible UTI  -Urine culture growing low count of GNR  -Completed three days of ceftriaxone therapy  Gram-positive cocci  bacteremia  -Unclear why blood cultures were collected on admit  -Blood cultures growing Staph epidermidis - most likely a contamination from skin telly.  -No clinical signs of active systemic infection.  Hypertension  Hypotension  -BP is reasonably controlled to slightly elevated now.  Noted that on 7/15 SBP dropped into low 80s.  BP improved after fluid bolus.    -Continue home norvasc, metoprolol and prazosin  Obesity  Body mass index is 36.57 kg/m². Morbid obesity complicates all aspects of disease management from diagnostic modalities to treatment. Weight loss encouraged and health benefits explained to patient.  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    -INR now 2.0.  Resume home warfarin this evening.  Continue routine outpatient monitoring.  Final Active Diagnoses:    Diagnosis Date Noted POA    PRINCIPAL PROBLEM:  Atypical chest pain [R07.89] 07/15/2025 Yes    Constipation [K59.00] 07/17/2025 Yes    Acute cystitis [N30.00] 07/17/2025 Yes    Hypotension [I95.9] 07/15/2025 No    Gram-positive cocci bacteremia [R78.81] 07/15/2025 Yes    Obesity [E66.9] 07/14/2025 Yes    History of venous thromboembolism [Z86.718] 07/14/2025 Not Applicable    Chronic pain syndrome [G89.4] 07/14/2025 Yes    Chronic gastritis [K29.50] 08/22/2024 Yes     Chronic    Vaso-occlusive pain due to sickle cell disease [D57.00] 04/16/2017 Yes    Hypertension [I10] 04/16/2017 Yes     Chronic      Problems Resolved During this Admission:       Discharged Condition: stable    Disposition: Home or Self Care    Follow Up:   Follow-up Information       Guider, MD Mariposa .    Specialty: Gastroenterology  Contact information:  [address] [id number] [phone]                           Patient Instructions:      Ambulatory referral/consult to Gastroenterology   Standing Status: Future    Referral Priority: Routine Referral Type: Consultation   Referral Reason: Specialty Services Required   Referred to Provider: TITO ECKERT Requested Specialty: Gastroenterology   Number of Visits Requested: 1     Diet Cardiac     Notify your health care provider if you experience any of the following:  increased confusion or weakness     Notify your health care provider if you experience any of the following:  persistent dizziness, light-headedness, or visual disturbances     Notify your health care provider if you experience any of the following:  worsening rash     Notify your health care provider if you experience any of the following:  severe persistent headache     Notify your health care provider if you experience any of the following:  difficulty breathing or increased cough     Notify your health care provider if you experience any of the following:  severe uncontrolled pain     Notify your health care provider if you experience any of the following:  persistent nausea and vomiting or diarrhea     Notify your health care provider if you experience any of the following:  temperature >100.4     Activity as tolerated       Significant Diagnostic Studies: Labs: BMP:   Recent Labs   Lab 07/17/25  0551 07/18/25 0445   GLU 91 80    142   K 3.4* 3.5    107   CO2 25 25   BUN 10 12   CREATININE 0.9 1.1   CALCIUM 8.7 9.1   MG 1.6 1.8   , CMP   Recent Labs   Lab 07/17/25  0551 07/18/25 0445    142   K 3.4* 3.5    107   CO2 25 25   GLU 91 80   BUN 10 12   CREATININE 0.9 1.1   CALCIUM 8.7 9.1   PROT 7.3 7.7   ALBUMIN 3.3* 3.5   BILITOT 0.3 0.3   ALKPHOS 94 88   AST 25 24   ALT 11 12   ANIONGAP 10 10   , CBC   Recent Labs   Lab 07/17/25  0551 07/18/25 0445   WBC 8.62 7.45   HGB 9.7* 10.4*   HCT 29.6* 31.7*   * 471*   , INR   Lab Results   Component Value Date    INR 2.0 (H) 07/18/2025    INR 3.3 (H) 07/17/2025    INR 3.3 (H) 07/16/2025    PROTIME 21.1 (H) 07/18/2025    PROTIME 33.8 (H)  "07/17/2025    PROTIME 33.9 (H) 07/16/2025   , Lipid Panel   Lab Results   Component Value Date    CHOL 128 12/22/2020    HDL 25 (L) 12/22/2020    LDLCALC 84.6 12/22/2020    TRIG 92 12/22/2020    CHOLHDL 19.5 (L) 12/22/2020   , Troponin   Recent Labs   Lab 07/14/25  0916 07/14/25  1546 07/15/25  0422   TROPONINI <0.006 <0.006 <0.006   , and A1C: No results for input(s): "HGBA1C" in the last 4320 hours.    Pending Diagnostic Studies:       None           Medications:  Reconciled Home Medications:      Medication List        PAUSE taking these medications      tiZANidine 4 MG tablet  Wait to take this until your doctor or other care provider tells you to start again.  Commonly known as: ZANAFLEX  Take 1 tablet (4 mg total) by mouth every 8 (eight) hours as needed.            START taking these medications      famotidine 40 MG tablet  Commonly known as: PEPCID  Take 1 tablet (40 mg total) by mouth 2 (two) times daily.     lactulose 20 gram/30 mL Soln  Commonly known as: CHRONULAC  Take 23 mLs (15 g total) by mouth 3 (three) times daily.     metoprolol tartrate 25 MG tablet  Commonly known as: LOPRESSOR  Take 1 tablet (25 mg total) by mouth 2 (two) times daily.     sucralfate 100 mg/mL suspension  Commonly known as: CARAFATE  Take 10 mLs (1 g total) by mouth 4 (four) times daily before meals and nightly.            CHANGE how you take these medications      warfarin 4 MG tablet  Commonly known as: COUMADIN  Take 1 tablet (4 mg total) by mouth Daily. As directed by the [facility] Clinic  What changed:   how much to take  additional instructions            CONTINUE taking these medications      acetaminophen 325 MG tablet  Commonly known as: TYLENOL  Take 2 tablets (650 mg total) by mouth every 8 (eight) hours as needed for Pain.     albuterol 90 mcg/actuation inhaler  Commonly known as: VENTOLIN HFA  Inhale 2 puffs into the lungs every 6 (six) hours as needed for Wheezing or Shortness of Breath. Rescue     amLODIPine 10 MG " tablet  Commonly known as: NORVASC  Take 1 tablet (10 mg total) by mouth once daily.     dicyclomine 10 MG capsule  Commonly known as: BENTYL  Take 10 mg by mouth daily as needed.     FLUoxetine 40 MG capsule  Take 2 capsules (80 mg total) by mouth once daily.     fluticasone propionate 50 mcg/actuation nasal spray  Commonly known as: FLONASE  1 spray (50 mcg total) by Each Nostril route daily as needed for Allergies.     folic acid 1 MG tablet  Commonly known as: FOLVITE  Take 1 tablet (1 mg total) by mouth once daily.     gabapentin 300 MG capsule  Commonly known as: NEURONTIN  Take 2-3 capsules (600-900 mg total) by mouth 3 (three) times daily.     morphine 30 MG 12 hr tablet  Commonly known as: MS CONTIN  Take 1 tablet (30 mg total) by mouth every 12 (twelve) hours.     naloxone 4 mg/actuation Spry  Commonly known as: NARCAN  4mg by nasal route as needed for opioid overdose; may repeat every 2-3 minutes in alternating nostrils until medical help arrives. Call 911     oxyCODONE-acetaminophen  mg per tablet  Commonly known as: PERCOCET  Take 1 tablet by mouth every 6 (six) hours as needed for Pain.     pantoprazole 40 MG tablet  Commonly known as: PROTONIX  Take 1 tablet (40 mg total) by mouth once daily.     prazosin 1 MG Cap  Commonly known as: MINIPRESS  Take 1 capsule (1 mg total) by mouth every evening.     senna-docusate 8.6-50 mg per tablet  Commonly known as: PERICOLACE  Take 1 tablet by mouth daily as needed for Constipation.     VITAMIN C ORAL  Take 1 capsule by mouth Daily.              Indwelling Lines/Drains at time of discharge:   Lines/Drains/Airways       Central Venous Catheter Line  Duration             Port A Cath Single Lumen 02/10/25 1709 Internal Jugular Right 157 days                        Time spent on the discharge of patient: 35 minutes         Keyur Hernandez MD  Department of Hospital Medicine  [facility]

## 2025-07-18 NOTE — TELEPHONE ENCOUNTER
Copied from CRM #[id number]. Topic: Medications - Medication Refill  >> Jul 18, 2025  1:00 PM Angelita wrote:  RX Name:oxyCODONE-acetaminophen  mg per tablet 1 tablet      How is it taken:     Quantity:       Preferred Pharmacy with phone number:    [facility]  [address] [address]               Ordering Provider:Zachariah       Contact Preference:[phone]     Addl info:Patient has 2 left She fixing to be discharge and would like it filled before she leaves.

## 2025-07-18 NOTE — DISCHARGE INSTRUCTIONS
Take all medications as prescribed.  Eat a strict bland and soft low salt cardiac diet.  Follow up with your physicians as scheduled - pcp within 1 week.  Gastroenterology within 1 month.  Hematology oncology within 1 month.  Thank you for trusting [facility] and Dr. Hernandez with your care.  We are honored that you entrusted us with your healthcare needs. Your satisfaction is very important to us and we hope you have been very pleased with your experience at [facility]. After your discharge you may receive a survey asking you to rate your hospital experience. We encourage you to take the time to complete the survey as your feedback allows us to identify areas for improvement as well as recognize our staff.   We hope that you have received the very best care possible during your hospitalization at [facility], as your satisfaction is our top priority.

## 2025-07-18 NOTE — ASSESSMENT & PLAN NOTE
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    -INR now 2.0.  Resume home warfarin this evening.  Continue routine outpatient monitoring.

## 2025-07-19 LAB
BACTERIA BLD CULT: ABNORMAL
BACTERIA BLD CULT: NORMAL
GRAM STN SPEC: ABNORMAL
GRAM STN SPEC: ABNORMAL

## 2025-07-21 ENCOUNTER — ANTI-COAG VISIT (OUTPATIENT)
Dept: CARDIOLOGY | Facility: CLINIC | Age: 45
End: 2025-07-21
Payer: MEDICARE

## 2025-07-21 DIAGNOSIS — Z86.711 HISTORY OF PULMONARY EMBOLISM: Chronic | ICD-10-CM

## 2025-07-21 DIAGNOSIS — Z79.01 WARFARIN ANTICOAGULATION: Primary | ICD-10-CM

## 2025-07-21 LAB
BACTERIA UR CULT: ABNORMAL
CARBA-R: ABNORMAL

## 2025-07-21 RX ORDER — OXYCODONE AND ACETAMINOPHEN 10; 325 MG/1; MG/1
1 TABLET ORAL EVERY 6 HOURS PRN
Qty: 120 TABLET | Refills: 0 | Status: SHIPPED | OUTPATIENT
Start: 2025-07-21 | End: 2025-08-20

## 2025-07-21 NOTE — PROGRESS NOTES
[facility]  Hospital Discharge Follow-Up    Nazanin Malone was recently discharged from an inpatient facility and is resuming care with the [facility].       Hospital Notes per Chart Review: Patient went in with sickle cell pain. No significant findings were noted or changes made to warfarin plan.      Anticoagulation Clinic Plan: Patient is to continue previous home dose. INR to be rechecked on 7/24.      Follow-Up Action Needed: none      Zoey Boyd, PharmD, BCPS  Clinical Pharmacist - [facility]  Phone: [phone] or Epic Secure Messaging

## 2025-07-21 NOTE — TELEPHONE ENCOUNTER
Copied from CRM #[id number]. Topic: Appointments - Appointment Access  >> Jul 21, 2025  9:30 AM Lidia wrote:  Reschedule Existing Appointment     Current appt date:7/17     Type of appt :virtual      Physician:Dr Soni      Reason for rescheduling: hospitalized      Caller:Nazanin Malone      Contact Preference:myoch    Requesting the soonest date possible

## 2025-07-21 NOTE — PROGRESS NOTES
Pt advised on dose per calendar and INR test date, orders faxed to [facility] for STAT INR on 7/24/25.

## 2025-07-22 ENCOUNTER — OFFICE VISIT (OUTPATIENT)
Dept: HEMATOLOGY/ONCOLOGY | Facility: CLINIC | Age: 45
End: 2025-07-22
Payer: MEDICARE

## 2025-07-22 DIAGNOSIS — D57.00 SICKLE-CELL DISEASE WITH PAIN: Primary | ICD-10-CM

## 2025-07-22 DIAGNOSIS — D52.1 DRUG-INDUCED FOLATE DEFICIENCY ANEMIA: ICD-10-CM

## 2025-07-22 PROCEDURE — 98007 SYNCH AUDIO-VIDEO EST HI 40: CPT | Mod: 95,,, | Performed by: INTERNAL MEDICINE

## 2025-07-22 PROCEDURE — G2211 COMPLEX E/M VISIT ADD ON: HCPCS | Mod: 95,,, | Performed by: INTERNAL MEDICINE

## 2025-07-22 PROCEDURE — 1159F MED LIST DOCD IN RCRD: CPT | Mod: CPTII,95,, | Performed by: INTERNAL MEDICINE

## 2025-07-22 RX ORDER — HYDROXYUREA 500 MG/1
1000 CAPSULE ORAL DAILY
Qty: 60 CAPSULE | Refills: 2 | Status: SHIPPED | OUTPATIENT
Start: 2025-07-22 | End: 2026-07-22

## 2025-07-22 RX ORDER — TIZANIDINE 4 MG/1
4 TABLET ORAL EVERY 8 HOURS PRN
Qty: 90 TABLET | Refills: 0 | Status: SHIPPED | OUTPATIENT
Start: 2025-07-22 | End: 2025-08-21

## 2025-07-22 NOTE — PROGRESS NOTES
The patient location is: [place]  The chief complaint leading to consultation is: Sickle Cell Disease    Visit type: audiovisual    Face to Face time with patient: 20  40 minutes of total time spent on the encounter, which includes face to face time and non-face to face time preparing to see the patient (eg, review of tests), Obtaining and/or reviewing separately obtained history, Documenting clinical information in the electronic or other health record, Independently interpreting results (not separately reported) and communicating results to the patient/family/caregiver, or Care coordination (not separately reported).         Each patient to whom he or she provides medical services by telemedicine is:  (1) informed of the relationship between the physician and patient and the respective role of any other health care provider with respect to management of the patient; and (2) notified that he or she may decline to receive medical services by telemedicine and may withdraw from such care at any time.    Notes: see below  Subjective:       Patient ID: Nazanin Malone is a 45 y.o. female.    Chief Complaint: Sickle Cell Anemia    HPI Sickle Beta 0 Thalassemia    Sickle Cell End Organ Damage  Pulmonary Embolism  Asthma  Hypertension  Avascular Necrosis Femur    Current Disease Modifying Therapy  Hydrea 1000mg daily - has been out of medication    Prior Disease Modifying Therapy  Exchange transfusions every 6 months    Home Pain Regimen  MS Contin 30mg every 12 hours - has not been taking and does not want to continue  Percocet 10/325 - every 6 hours prn  Tizanidine 4mg every 8 hours prn  Neurontin 600mg tid    Hemoglobin Baseline  10 grams    IV Access  Dual Lumen Port    Health Maintenance  ECHO 7/2025, normal  UA 7/2025 negative  Eye Exam - check next visit  Dexa - due  Immunizations - UTD    Age Appropriate Cancer Screenings  Need mammogram  Need colonoscopy    Hospital Admissions this Year    7/18/2025  7/4/2025  6/21/2025  6/11/2025  5/30/2025  5/15/2025  5/9/2025  4/6/2025  3/16/2025  3/2/2025  2/23/2025  2/10/2025  2/6/2025  1/25/2025      Review of Systems   Constitutional:  Negative for appetite change and unexpected weight change.   HENT:  Negative for mouth sores.    Eyes:  Positive for visual disturbance.   Respiratory:  Negative for cough and shortness of breath.    Cardiovascular:  Positive for chest pain.   Gastrointestinal:  Negative for abdominal pain and diarrhea.   Genitourinary:  Negative for frequency.   Musculoskeletal:  Negative for back pain.   Integumentary:  Negative for rash.   Neurological:  Positive for headaches.   Hematological:  Negative for adenopathy.   Psychiatric/Behavioral:  The patient is nervous/anxious.          Objective:      Physical Exam no exam for telehealth visit  Current Medications[1]   Lab Results   Component Value Date    WBC 7.45 07/18/2025    HGB 10.4 (L) 07/18/2025    HCT 31.7 (L) 07/18/2025    MCV 69 (L) 07/18/2025     (H) 07/18/2025        CMP  Sodium   Date Value Ref Range Status   07/18/2025 142 136 - 145 mmol/L Final   06/11/2025 143 135 - 146 mmol/L Final   03/14/2025 140 136 - 145 mmol/L Final     Potassium   Date Value Ref Range Status   07/18/2025 3.5 3.5 - 5.1 mmol/L Final   06/11/2025 3.5 (L) 3.6 - 5.2 mmol/L Final   03/14/2025 4.1 3.5 - 5.1 mmol/L Final     Chloride   Date Value Ref Range Status   07/18/2025 107 95 - 110 mmol/L Final   03/14/2025 106 95 - 110 mmol/L Final     CO2   Date Value Ref Range Status   07/18/2025 25 23 - 29 mmol/L Final   03/14/2025 26 23 - 29 mmol/L Final     Carbon Dioxide   Date Value Ref Range Status   06/11/2025 27 24 - 32 mmol/L Final     Glucose   Date Value Ref Range Status   07/18/2025 80 70 - 110 mg/dL Final   03/14/2025 78 70 - 110 mg/dL Final     BUN   Date Value Ref Range Status   07/18/2025 12 6 - 20 mg/dL Final     Creatinine   Date Value Ref Range Status   07/18/2025 1.1 0.5 - 1.4 mg/dL Final      Calcium   Date Value Ref Range Status   07/18/2025 9.1 8.7 - 10.5 mg/dL Final   06/11/2025 9.1 8.4 - 10.3 mg/dL Final   03/14/2025 9.0 8.7 - 10.5 mg/dL Final     Protein Total   Date Value Ref Range Status   07/18/2025 7.7 6.0 - 8.4 gm/dL Final     Total Protein   Date Value Ref Range Status   03/11/2025 8.3 6.0 - 8.4 g/dL Final     Albumin   Date Value Ref Range Status   07/18/2025 3.5 3.5 - 5.2 g/dL Final   06/11/2025 3.7 3.4 - 5.0 g/dL Final   03/11/2025 3.8 3.5 - 5.2 g/dL Final     Total Bilirubin   Date Value Ref Range Status   06/11/2025 0.5 <1.3 mg/dL Final   03/11/2025 0.8 0.1 - 1.0 mg/dL Final     Comment:     For infants and newborns, interpretation of results should be based  on gestational age, weight and in agreement with clinical  observations.    Premature Infant recommended reference ranges:  Up to 24 hours.............<8.0 mg/dL  Up to 48 hours............<12.0 mg/dL  3-5 days..................<15.0 mg/dL  6-29 days.................<15.0 mg/dL       Bilirubin Total   Date Value Ref Range Status   07/18/2025 0.3 0.1 - 1.0 mg/dL Final     Comment:     For infants and newborns, interpretation of results should be based   on gestational age, weight and in agreement with clinical   observations.    Premature Infant recommended reference ranges:   0-24 hours:  <8.0 mg/dL   24-48 hours: <12.0 mg/dL   3-5 days:    <15.0 mg/dL   6-29 days:   <15.0 mg/dL     Alkaline Phosphatase   Date Value Ref Range Status   03/11/2025 97 40 - 150 U/L Final     ALP   Date Value Ref Range Status   07/18/2025 88 40 - 150 unit/L Final     AST   Date Value Ref Range Status   07/18/2025 24 11 - 45 unit/L Final   06/11/2025 19 <45 U/L Final   03/11/2025 22 10 - 40 U/L Final     ALT   Date Value Ref Range Status   07/18/2025 12 10 - 44 unit/L Final   06/11/2025 12 <46 U/L Final   03/11/2025 16 10 - 44 U/L Final     Anion Gap   Date Value Ref Range Status   07/18/2025 10 8 - 16 mmol/L Final     eGFR if    Date  Value Ref Range Status   03/19/2021 >60.0 >60 mL/min/1.73 m^2 Final     eGFR if non    Date Value Ref Range Status   03/19/2021 >60.0 >60 mL/min/1.73 m^2 Final     Comment:     Calculation used to obtain the estimated glomerular filtration  rate (eGFR) is the CKD-EPI equation.             Assessment:       Problem List Items Addressed This Visit    None  Visit Diagnoses         Sickle-cell disease with pain    -  Primary    Relevant Medications    tiZANidine (ZANAFLEX) 4 MG tablet    hydroxyurea (HYDREA) 500 mg Cap            Plan:       Discussed frequent VOE events and need for improved sickle cell disease management.  The primary treatment for modifying sickle cell is hydroxyurea at target dose of 35/mg/kg.  Patient has been on 1000mg daily for year, not near therapeutic dosing. Target dose is 3000mg daily.    Discussed goal to gradually increase dose over time to target - increase by 500mg every 8-12 weeks.  Since she has been off hydrea completely for few months, restart today at 1000mg daily.  Return visit in 8-12 weeks to assess tolerance and potentially increase dose.    Discussed history of exchange transfusion every 6 months.   Would consider resuming, after attempt to reach target hydrea dose.  Hydrea improves sickle cell management daily, exchange transfusion is a episodic therapy.    MS Contin has been discontinued by patient. Do not plan to restart.   PUNEET sickle cell guidelines (with patient representatives on panel) recommend against long-acting opioid therapy in sickle cell disease due to long-term risks and development of dependency. Discussed with patient.      BMT Chart Routing      Follow up with physician 3 months. in clinic or virtual   Follow up with [facility]    Provider visit type Benign hem   Infusion scheduling note    Injection scheduling note    Labs CBC, CMP, ferritin, folate and hemoglobin   Scheduling:  Preferred lab:  Lab interval:  labs a few days before visit   Imaging     Pharmacy appointment    Other referrals                       Visit today included increased complexity associated with the care of the episodic problem disease management addressed and managing the longitudinal care of the patient due to the serious and/or complex managed problem(s) sickle cell disease.                              [1]   Current Outpatient Medications   Medication Sig Dispense Refill    acetaminophen (TYLENOL) 325 MG tablet Take 2 tablets (650 mg total) by mouth every 8 (eight) hours as needed for Pain.      albuterol (VENTOLIN HFA) 90 mcg/actuation inhaler Inhale 2 puffs into the lungs every 6 (six) hours as needed for Wheezing or Shortness of Breath. Rescue 18 g 2    amLODIPine (NORVASC) 10 MG tablet Take 1 tablet (10 mg total) by mouth once daily. 90 tablet 3    ascorbic acid (VITAMIN C ORAL) Take 1 capsule by mouth Daily.      dicyclomine (BENTYL) 10 MG capsule Take 10 mg by mouth daily as needed.      famotidine (PEPCID) 40 MG tablet Take 1 tablet (40 mg total) by mouth 2 (two) times daily. 60 tablet 1    FLUoxetine 40 MG capsule Take 2 capsules (80 mg total) by mouth once daily. 60 capsule 11    fluticasone propionate (FLONASE) 50 mcg/actuation nasal spray 1 spray (50 mcg total) by Each Nostril route daily as needed for Allergies.      folic acid (FOLVITE) 1 MG tablet Take 1 tablet (1 mg total) by mouth once daily. 30 tablet 5    gabapentin (NEURONTIN) 300 MG capsule Take 2-3 capsules (600-900 mg total) by mouth 3 (three) times daily. 270 capsule 11    lactulose (CHRONULAC) 10 gram/15 mL solution Take 23 mLs (15.3333 g total) by mouth 3 (three) times daily. 2070 mL 1    metoprolol tartrate (LOPRESSOR) 25 MG tablet Take 1 tablet (25 mg total) by mouth 2 (two) times daily. 60 tablet 1    naloxone (NARCAN) 4 mg/actuation Spry 4mg by nasal route as needed for opioid overdose; may repeat every 2-3 minutes in alternating nostrils until medical help arrives. Call 911 2 each 11     oxyCODONE-acetaminophen (PERCOCET)  mg per tablet Take 1 tablet by mouth every 6 (six) hours as needed for Pain. 120 tablet 0    pantoprazole (PROTONIX) 40 MG tablet Take 1 tablet (40 mg total) by mouth once daily. 90 tablet 3    prazosin (MINIPRESS) 1 MG Cap Take 1 capsule (1 mg total) by mouth every evening. 30 capsule 11    senna-docusate 8.6-50 mg (PERICOLACE) 8.6-50 mg per tablet Take 1 tablet by mouth daily as needed for Constipation.      sucralfate (CARAFATE) 100 mg/mL suspension Take 10 mLs (1 g total) by mouth 4 (four) times daily before meals and nightly. 1200 mL 0    warfarin (COUMADIN) 4 MG tablet Take 1 tablet (4 mg total) by mouth Daily. As directed by the [facility] 35 tablet 3    hydroxyurea (HYDREA) 500 mg Cap Take 2 capsules (1,000 mg total) by mouth once daily. 60 capsule 2    tiZANidine (ZANAFLEX) 4 MG tablet Take 1 tablet (4 mg total) by mouth every 8 (eight) hours as needed. 90 tablet 0     No current facility-administered medications for this visit.

## 2025-08-04 ENCOUNTER — OUTPATIENT CASE MANAGEMENT (OUTPATIENT)
Dept: ADMINISTRATIVE | Facility: OTHER | Age: 45
End: 2025-08-04
Payer: MEDICARE

## 2025-08-06 ENCOUNTER — HOSPITAL ENCOUNTER (EMERGENCY)
Facility: OTHER | Age: 45
Discharge: HOME | End: 2025-08-06
Attending: EMERGENCY MEDICINE
Payer: MEDICARE

## 2025-08-06 VITALS
RESPIRATION RATE: 18 BRPM | HEART RATE: 98 BPM | OXYGEN SATURATION: 100 % | SYSTOLIC BLOOD PRESSURE: 123 MMHG | TEMPERATURE: 99 F | DIASTOLIC BLOOD PRESSURE: 67 MMHG

## 2025-08-06 DIAGNOSIS — J06.9 VIRAL URI: ICD-10-CM

## 2025-08-06 DIAGNOSIS — S99.911A RIGHT ANKLE INJURY, INITIAL ENCOUNTER: ICD-10-CM

## 2025-08-06 DIAGNOSIS — M79.604 RIGHT LEG PAIN: Primary | ICD-10-CM

## 2025-08-06 DIAGNOSIS — S99.921A RIGHT FOOT INJURY, INITIAL ENCOUNTER: ICD-10-CM

## 2025-08-06 DIAGNOSIS — R05.9 COUGH: ICD-10-CM

## 2025-08-06 LAB
ABSOLUTE EOSINOPHIL (OHS): 0.18 K/UL
ABSOLUTE MONOCYTE (OHS): 0.71 K/UL (ref 0.3–1)
ABSOLUTE NEUTROPHIL COUNT (OHS): 5.14 K/UL (ref 1.8–7.7)
ALBUMIN SERPL BCP-MCNC: 3.7 G/DL (ref 3.5–5.2)
ALP SERPL-CCNC: 85 UNIT/L (ref 40–150)
ALT SERPL W/O P-5'-P-CCNC: 12 UNIT/L (ref 10–44)
ANION GAP (OHS): 7 MMOL/L (ref 8–16)
AST SERPL-CCNC: 30 UNIT/L (ref 11–45)
BASOPHILS # BLD AUTO: 0.03 K/UL
BASOPHILS NFR BLD AUTO: 0.3 %
BILIRUB SERPL-MCNC: 0.6 MG/DL (ref 0.1–1)
BUN SERPL-MCNC: 9 MG/DL (ref 6–20)
CALCIUM SERPL-MCNC: 9.1 MG/DL (ref 8.7–10.5)
CHLORIDE SERPL-SCNC: 107 MMOL/L (ref 95–110)
CO2 SERPL-SCNC: 24 MMOL/L (ref 23–29)
CREAT SERPL-MCNC: 0.9 MG/DL (ref 0.5–1.4)
CTP QC/QA: YES
CTP QC/QA: YES
ERYTHROCYTE [DISTWIDTH] IN BLOOD BY AUTOMATED COUNT: 22.9 % (ref 11.5–14.5)
GFR SERPLBLD CREATININE-BSD FMLA CKD-EPI: >60 ML/MIN/1.73/M2
GLUCOSE SERPL-MCNC: 90 MG/DL (ref 70–110)
HCT VFR BLD AUTO: 28.1 % (ref 37–48.5)
HCV AB SERPL QL IA: NORMAL
HGB BLD-MCNC: 9.4 GM/DL (ref 12–16)
HIV 1+2 AB+HIV1 P24 AG SERPL QL IA: NORMAL
HOLD SPECIMEN: NORMAL
IMM GRANULOCYTES # BLD AUTO: 0.03 K/UL (ref 0–0.04)
IMM GRANULOCYTES NFR BLD AUTO: 0.3 % (ref 0–0.5)
INR PPP: 1 (ref 0.8–1.2)
LYMPHOCYTES # BLD AUTO: 2.56 K/UL (ref 1–4.8)
MCH RBC QN AUTO: 21.7 PG (ref 27–31)
MCHC RBC AUTO-ENTMCNC: 33.5 G/DL (ref 32–36)
MCV RBC AUTO: 65 FL (ref 82–98)
NUCLEATED RBC (/100WBC) (OHS): 2 /100 WBC
PLATELET # BLD AUTO: 384 K/UL (ref 150–450)
PMV BLD AUTO: 9.2 FL (ref 9.2–12.9)
POC MOLECULAR INFLUENZA A AGN: NEGATIVE
POC MOLECULAR INFLUENZA B AGN: NEGATIVE
POTASSIUM SERPL-SCNC: 3.6 MMOL/L (ref 3.5–5.1)
PROT SERPL-MCNC: 7.2 GM/DL (ref 6–8.4)
PROTHROMBIN TIME: 11.4 SECONDS (ref 9–12.5)
RBC # BLD AUTO: 4.33 M/UL (ref 4–5.4)
RELATIVE EOSINOPHIL (OHS): 2.1 %
RELATIVE LYMPHOCYTE (OHS): 29.6 % (ref 18–48)
RELATIVE MONOCYTE (OHS): 8.2 % (ref 4–15)
RELATIVE NEUTROPHIL (OHS): 59.5 % (ref 38–73)
RETICS/RBC NFR AUTO: 0.6 % (ref 0.5–2.5)
SARS-COV-2 RDRP RESP QL NAA+PROBE: NEGATIVE
SODIUM SERPL-SCNC: 138 MMOL/L (ref 136–145)
WBC # BLD AUTO: 8.65 K/UL (ref 3.9–12.7)

## 2025-08-06 PROCEDURE — 63600175 PHARM REV CODE 636 W HCPCS: Performed by: EMERGENCY MEDICINE

## 2025-08-06 PROCEDURE — 25000003 PHARM REV CODE 250: Performed by: EMERGENCY MEDICINE

## 2025-08-06 PROCEDURE — 80053 COMPREHEN METABOLIC PANEL: CPT | Performed by: PHYSICIAN ASSISTANT

## 2025-08-06 PROCEDURE — 96376 TX/PRO/DX INJ SAME DRUG ADON: CPT | Mod: HCNC

## 2025-08-06 PROCEDURE — 93010 ELECTROCARDIOGRAM REPORT: CPT | Mod: ,,, | Performed by: INTERNAL MEDICINE

## 2025-08-06 PROCEDURE — 96375 TX/PRO/DX INJ NEW DRUG ADDON: CPT | Mod: HCNC

## 2025-08-06 PROCEDURE — 99285 EMERGENCY DEPT VISIT HI MDM: CPT | Mod: 25,HCNC

## 2025-08-06 PROCEDURE — 86803 HEPATITIS C AB TEST: CPT | Performed by: EMERGENCY MEDICINE

## 2025-08-06 PROCEDURE — 85610 PROTHROMBIN TIME: CPT | Performed by: EMERGENCY MEDICINE

## 2025-08-06 PROCEDURE — 87635 SARS-COV-2 COVID-19 AMP PRB: CPT | Performed by: EMERGENCY MEDICINE

## 2025-08-06 PROCEDURE — 96374 THER/PROPH/DIAG INJ IV PUSH: CPT | Mod: HCNC

## 2025-08-06 PROCEDURE — 87389 HIV-1 AG W/HIV-1&-2 AB AG IA: CPT | Performed by: EMERGENCY MEDICINE

## 2025-08-06 PROCEDURE — 85045 AUTOMATED RETICULOCYTE COUNT: CPT | Performed by: PHYSICIAN ASSISTANT

## 2025-08-06 PROCEDURE — 96361 HYDRATE IV INFUSION ADD-ON: CPT | Mod: HCNC

## 2025-08-06 PROCEDURE — 93005 ELECTROCARDIOGRAM TRACING: CPT

## 2025-08-06 PROCEDURE — 85025 COMPLETE CBC W/AUTO DIFF WBC: CPT | Performed by: PHYSICIAN ASSISTANT

## 2025-08-06 RX ORDER — HYDROMORPHONE HYDROCHLORIDE 1 MG/ML
2 INJECTION, SOLUTION INTRAMUSCULAR; INTRAVENOUS; SUBCUTANEOUS
Refills: 0 | Status: COMPLETED | OUTPATIENT
Start: 2025-08-06 | End: 2025-08-06

## 2025-08-06 RX ORDER — DIPHENHYDRAMINE HCL 25 MG
25 CAPSULE ORAL
Status: COMPLETED | OUTPATIENT
Start: 2025-08-06 | End: 2025-08-06

## 2025-08-06 RX ORDER — ONDANSETRON HYDROCHLORIDE 2 MG/ML
4 INJECTION, SOLUTION INTRAVENOUS
Status: COMPLETED | OUTPATIENT
Start: 2025-08-06 | End: 2025-08-06

## 2025-08-06 RX ORDER — ACETAMINOPHEN 500 MG
1000 TABLET ORAL
Status: COMPLETED | OUTPATIENT
Start: 2025-08-06 | End: 2025-08-06

## 2025-08-06 RX ORDER — HEPARIN 100 UNIT/ML
5 SYRINGE INTRAVENOUS ONCE
Status: DISCONTINUED | OUTPATIENT
Start: 2025-08-07 | End: 2025-08-07 | Stop reason: HOSPADM

## 2025-08-06 RX ADMIN — SODIUM CHLORIDE 1000 ML: 9 INJECTION, SOLUTION INTRAVENOUS at 08:08

## 2025-08-06 RX ADMIN — ACETAMINOPHEN 1000 MG: 500 TABLET, FILM COATED ORAL at 08:08

## 2025-08-06 RX ADMIN — ONDANSETRON 4 MG: 2 INJECTION INTRAMUSCULAR; INTRAVENOUS at 08:08

## 2025-08-06 RX ADMIN — DIPHENHYDRAMINE HYDROCHLORIDE 25 MG: 25 CAPSULE ORAL at 09:08

## 2025-08-06 RX ADMIN — HYDROMORPHONE HYDROCHLORIDE 2 MG: 1 INJECTION, SOLUTION INTRAMUSCULAR; INTRAVENOUS; SUBCUTANEOUS at 08:08

## 2025-08-06 RX ADMIN — HYDROMORPHONE HYDROCHLORIDE 2 MG: 1 INJECTION, SOLUTION INTRAMUSCULAR; INTRAVENOUS; SUBCUTANEOUS at 09:08

## 2025-08-06 RX ADMIN — DIPHENHYDRAMINE HYDROCHLORIDE 25 MG: 25 CAPSULE ORAL at 08:08

## 2025-08-06 RX ADMIN — HYDROMORPHONE HYDROCHLORIDE 2 MG: 1 INJECTION, SOLUTION INTRAMUSCULAR; INTRAVENOUS; SUBCUTANEOUS at 10:08

## 2025-08-07 ENCOUNTER — ANTI-COAG VISIT (OUTPATIENT)
Dept: CARDIOLOGY | Facility: CLINIC | Age: 45
End: 2025-08-07
Payer: MEDICARE

## 2025-08-07 DIAGNOSIS — Z79.01 WARFARIN ANTICOAGULATION: Primary | ICD-10-CM

## 2025-08-07 DIAGNOSIS — Z86.711 HISTORY OF PULMONARY EMBOLISM: Chronic | ICD-10-CM

## 2025-08-07 LAB
OHS QRS DURATION: 98 MS
OHS QTC CALCULATION: 457 MS

## 2025-08-08 ENCOUNTER — TELEPHONE (OUTPATIENT)
Dept: HEMATOLOGY/ONCOLOGY | Facility: CLINIC | Age: 45
End: 2025-08-08
Payer: MEDICARE

## 2025-08-08 ENCOUNTER — HOSPITAL ENCOUNTER (INPATIENT)
Facility: OTHER | Age: 45
LOS: 4 days | Discharge: HOME | DRG: 812 | End: 2025-08-13
Attending: STUDENT IN AN ORGANIZED HEALTH CARE EDUCATION/TRAINING PROGRAM | Admitting: INTERNAL MEDICINE
Payer: MEDICARE

## 2025-08-08 DIAGNOSIS — F19.20 DRUG DEPENDENCE: Chronic | ICD-10-CM

## 2025-08-08 DIAGNOSIS — M48.02 CERVICAL SPINAL STENOSIS: ICD-10-CM

## 2025-08-08 DIAGNOSIS — Z01.419 WELL WOMAN EXAM: ICD-10-CM

## 2025-08-08 DIAGNOSIS — R10.9 ABDOMINAL PAIN, UNSPECIFIED ABDOMINAL LOCATION: ICD-10-CM

## 2025-08-08 DIAGNOSIS — R07.9 CHEST PAIN: ICD-10-CM

## 2025-08-08 DIAGNOSIS — D57.00 SICKLE CELL CRISIS: Primary | ICD-10-CM

## 2025-08-08 DIAGNOSIS — D57.439 SICKLE CELL DISEASE, TYPE S BETA-ZERO THALASSEMIA WITH CRISIS: ICD-10-CM

## 2025-08-08 LAB
ABSOLUTE EOSINOPHIL (OHS): 0.19 K/UL
ABSOLUTE MONOCYTE (OHS): 0.86 K/UL (ref 0.3–1)
ABSOLUTE NEUTROPHIL COUNT (OHS): 3.24 K/UL (ref 1.8–7.7)
ALBUMIN SERPL BCP-MCNC: 3.9 G/DL (ref 3.5–5.2)
ALP SERPL-CCNC: 89 UNIT/L (ref 40–150)
ALT SERPL W/O P-5'-P-CCNC: 15 UNIT/L (ref 10–44)
ANION GAP (OHS): 8 MMOL/L (ref 8–16)
ANISOCYTOSIS BLD QL SMEAR: SLIGHT
AST SERPL-CCNC: 42 UNIT/L (ref 11–45)
BASOPHILS # BLD AUTO: 0.03 K/UL
BASOPHILS NFR BLD AUTO: 0.5 %
BILIRUB SERPL-MCNC: 0.5 MG/DL (ref 0.1–1)
BILIRUB UR QL STRIP.AUTO: NEGATIVE
BUN SERPL-MCNC: 5 MG/DL (ref 6–20)
CALCIUM SERPL-MCNC: 9.6 MG/DL (ref 8.7–10.5)
CHLORIDE SERPL-SCNC: 105 MMOL/L (ref 95–110)
CLARITY UR: CLEAR
CO2 SERPL-SCNC: 26 MMOL/L (ref 23–29)
COLOR UR AUTO: COLORLESS
CREAT SERPL-MCNC: 0.9 MG/DL (ref 0.5–1.4)
ERYTHROCYTE [DISTWIDTH] IN BLOOD BY AUTOMATED COUNT: 22.9 % (ref 11.5–14.5)
GFR SERPLBLD CREATININE-BSD FMLA CKD-EPI: >60 ML/MIN/1.73/M2
GLUCOSE SERPL-MCNC: 83 MG/DL (ref 70–110)
GLUCOSE UR QL STRIP: NEGATIVE
HCT VFR BLD AUTO: 30.1 % (ref 37–48.5)
HGB BLD-MCNC: 9.8 GM/DL (ref 12–16)
HGB UR QL STRIP: NEGATIVE
HOLD SPECIMEN: NORMAL
HOLD SPECIMEN: NORMAL
HYPOCHROMIA BLD QL SMEAR: NORMAL
IMM GRANULOCYTES # BLD AUTO: 0.01 K/UL (ref 0–0.04)
IMM GRANULOCYTES NFR BLD AUTO: 0.2 % (ref 0–0.5)
INR PPP: 1.4 (ref 0.8–1.2)
KETONES UR QL STRIP: NEGATIVE
LEUKOCYTE ESTERASE UR QL STRIP: NEGATIVE
LYMPHOCYTES # BLD AUTO: 2.01 K/UL (ref 1–4.8)
MAGNESIUM SERPL-MCNC: 2 MG/DL (ref 1.6–2.6)
MCH RBC QN AUTO: 21.5 PG (ref 27–31)
MCHC RBC AUTO-ENTMCNC: 32.6 G/DL (ref 32–36)
MCV RBC AUTO: 66 FL (ref 82–98)
NITRITE UR QL STRIP: NEGATIVE
NUCLEATED RBC (/100WBC) (OHS): 1 /100 WBC
OVALOCYTES BLD QL SMEAR: NORMAL
PH UR STRIP: 7 [PH]
PLATELET # BLD AUTO: 361 K/UL (ref 150–450)
PLATELET BLD QL SMEAR: NORMAL
PMV BLD AUTO: 9.3 FL (ref 9.2–12.9)
POIKILOCYTOSIS BLD QL SMEAR: SLIGHT
POLYCHROMASIA BLD QL SMEAR: NORMAL
POTASSIUM SERPL-SCNC: 3.4 MMOL/L (ref 3.5–5.1)
PROT SERPL-MCNC: 7.3 GM/DL (ref 6–8.4)
PROT UR QL STRIP: NEGATIVE
PROTHROMBIN TIME: 15.7 SECONDS (ref 9–12.5)
RBC # BLD AUTO: 4.55 M/UL (ref 4–5.4)
RELATIVE EOSINOPHIL (OHS): 3 %
RELATIVE LYMPHOCYTE (OHS): 31.7 % (ref 18–48)
RELATIVE MONOCYTE (OHS): 13.6 % (ref 4–15)
RELATIVE NEUTROPHIL (OHS): 51 % (ref 38–73)
RETICS/RBC NFR AUTO: 0.7 % (ref 0.5–2.5)
SCHISTOCYTES BLD QL SMEAR: NORMAL
SODIUM SERPL-SCNC: 139 MMOL/L (ref 136–145)
SP GR UR STRIP: <1.005
TARGETS BLD QL SMEAR: NORMAL
UROBILINOGEN UR STRIP-ACNC: NEGATIVE EU/DL
WBC # BLD AUTO: 6.34 K/UL (ref 3.9–12.7)

## 2025-08-08 PROCEDURE — 63600175 PHARM REV CODE 636 W HCPCS: Mod: HCNC | Performed by: NURSE PRACTITIONER

## 2025-08-08 PROCEDURE — 85025 COMPLETE CBC W/AUTO DIFF WBC: CPT | Mod: HCNC | Performed by: NURSE PRACTITIONER

## 2025-08-08 PROCEDURE — 25000003 PHARM REV CODE 250: Mod: HCNC | Performed by: NURSE PRACTITIONER

## 2025-08-08 PROCEDURE — 85045 AUTOMATED RETICULOCYTE COUNT: CPT | Mod: HCNC | Performed by: NURSE PRACTITIONER

## 2025-08-08 PROCEDURE — G0378 HOSPITAL OBSERVATION PER HR: HCPCS | Mod: HCNC

## 2025-08-08 PROCEDURE — 81003 URINALYSIS AUTO W/O SCOPE: CPT | Mod: HCNC | Performed by: NURSE PRACTITIONER

## 2025-08-08 PROCEDURE — 96376 TX/PRO/DX INJ SAME DRUG ADON: CPT | Mod: HCNC

## 2025-08-08 PROCEDURE — 96361 HYDRATE IV INFUSION ADD-ON: CPT | Mod: HCNC

## 2025-08-08 PROCEDURE — 85610 PROTHROMBIN TIME: CPT | Mod: HCNC | Performed by: HOSPITALIST

## 2025-08-08 PROCEDURE — 96374 THER/PROPH/DIAG INJ IV PUSH: CPT | Mod: HCNC

## 2025-08-08 PROCEDURE — 80053 COMPREHEN METABOLIC PANEL: CPT | Mod: HCNC | Performed by: NURSE PRACTITIONER

## 2025-08-08 PROCEDURE — 99285 EMERGENCY DEPT VISIT HI MDM: CPT | Mod: 25,HCNC

## 2025-08-08 PROCEDURE — 96375 TX/PRO/DX INJ NEW DRUG ADDON: CPT | Mod: HCNC

## 2025-08-08 PROCEDURE — 83735 ASSAY OF MAGNESIUM: CPT | Mod: HCNC | Performed by: NURSE PRACTITIONER

## 2025-08-08 PROCEDURE — 36415 COLL VENOUS BLD VENIPUNCTURE: CPT | Mod: HCNC | Performed by: NURSE PRACTITIONER

## 2025-08-08 RX ORDER — PANTOPRAZOLE SODIUM 40 MG/1
40 TABLET, DELAYED RELEASE ORAL DAILY
Status: DISCONTINUED | OUTPATIENT
Start: 2025-08-09 | End: 2025-08-13 | Stop reason: HOSPADM

## 2025-08-08 RX ORDER — DIPHENHYDRAMINE HYDROCHLORIDE 50 MG/ML
12.5 INJECTION, SOLUTION INTRAMUSCULAR; INTRAVENOUS
Status: COMPLETED | OUTPATIENT
Start: 2025-08-08 | End: 2025-08-08

## 2025-08-08 RX ORDER — OXYCODONE AND ACETAMINOPHEN 10; 325 MG/1; MG/1
1 TABLET ORAL EVERY 4 HOURS PRN
Refills: 0 | Status: DISCONTINUED | OUTPATIENT
Start: 2025-08-08 | End: 2025-08-09

## 2025-08-08 RX ORDER — FLUOXETINE 20 MG/1
80 CAPSULE ORAL DAILY
Status: DISCONTINUED | OUTPATIENT
Start: 2025-08-09 | End: 2025-08-13 | Stop reason: HOSPADM

## 2025-08-08 RX ORDER — FLUTICASONE PROPIONATE 50 MCG
1 SPRAY, SUSPENSION (ML) NASAL DAILY
Status: DISCONTINUED | OUTPATIENT
Start: 2025-08-09 | End: 2025-08-13 | Stop reason: HOSPADM

## 2025-08-08 RX ORDER — FOLIC ACID 1 MG/1
1 TABLET ORAL DAILY
Status: DISCONTINUED | OUTPATIENT
Start: 2025-08-09 | End: 2025-08-13 | Stop reason: HOSPADM

## 2025-08-08 RX ORDER — WARFARIN 2 MG/1
4 TABLET ORAL DAILY
Status: DISCONTINUED | OUTPATIENT
Start: 2025-08-09 | End: 2025-08-12

## 2025-08-08 RX ORDER — HYDROMORPHONE HYDROCHLORIDE 2 MG/ML
2 INJECTION, SOLUTION INTRAMUSCULAR; INTRAVENOUS; SUBCUTANEOUS
Status: COMPLETED | OUTPATIENT
Start: 2025-08-08 | End: 2025-08-08

## 2025-08-08 RX ORDER — ONDANSETRON HYDROCHLORIDE 2 MG/ML
4 INJECTION, SOLUTION INTRAVENOUS
Status: COMPLETED | OUTPATIENT
Start: 2025-08-08 | End: 2025-08-08

## 2025-08-08 RX ORDER — AMOXICILLIN 250 MG
1 CAPSULE ORAL DAILY PRN
Status: DISCONTINUED | OUTPATIENT
Start: 2025-08-08 | End: 2025-08-08

## 2025-08-08 RX ORDER — DIPHENHYDRAMINE HCL 25 MG
25 CAPSULE ORAL
Status: COMPLETED | OUTPATIENT
Start: 2025-08-08 | End: 2025-08-08

## 2025-08-08 RX ORDER — HYDROXYUREA 500 MG/1
1000 CAPSULE ORAL DAILY
Status: DISCONTINUED | OUTPATIENT
Start: 2025-08-09 | End: 2025-08-13 | Stop reason: HOSPADM

## 2025-08-08 RX ORDER — SODIUM CHLORIDE, SODIUM LACTATE, POTASSIUM CHLORIDE, CALCIUM CHLORIDE 600; 310; 30; 20 MG/100ML; MG/100ML; MG/100ML; MG/100ML
INJECTION, SOLUTION INTRAVENOUS CONTINUOUS
Status: DISCONTINUED | OUTPATIENT
Start: 2025-08-08 | End: 2025-08-11

## 2025-08-08 RX ORDER — ACETAMINOPHEN 500 MG
1000 TABLET ORAL EVERY 8 HOURS
Status: DISCONTINUED | OUTPATIENT
Start: 2025-08-08 | End: 2025-08-13 | Stop reason: HOSPADM

## 2025-08-08 RX ORDER — METOPROLOL TARTRATE 25 MG/1
25 TABLET, FILM COATED ORAL 2 TIMES DAILY
Status: DISCONTINUED | OUTPATIENT
Start: 2025-08-08 | End: 2025-08-13 | Stop reason: HOSPADM

## 2025-08-08 RX ORDER — DIPHENHYDRAMINE HYDROCHLORIDE 50 MG/ML
12.5 INJECTION, SOLUTION INTRAMUSCULAR; INTRAVENOUS EVERY 4 HOURS PRN
Status: DISCONTINUED | OUTPATIENT
Start: 2025-08-08 | End: 2025-08-09

## 2025-08-08 RX ORDER — DIPHENHYDRAMINE HYDROCHLORIDE 50 MG/ML
12.5 INJECTION, SOLUTION INTRAMUSCULAR; INTRAVENOUS EVERY 6 HOURS PRN
Status: DISCONTINUED | OUTPATIENT
Start: 2025-08-08 | End: 2025-08-08

## 2025-08-08 RX ORDER — SODIUM CHLORIDE 0.9 % (FLUSH) 0.9 %
10 SYRINGE (ML) INJECTION
Status: DISCONTINUED | OUTPATIENT
Start: 2025-08-08 | End: 2025-08-08

## 2025-08-08 RX ORDER — AMOXICILLIN 250 MG
1 CAPSULE ORAL 2 TIMES DAILY
Status: DISCONTINUED | OUTPATIENT
Start: 2025-08-08 | End: 2025-08-13 | Stop reason: HOSPADM

## 2025-08-08 RX ORDER — ASCORBIC ACID 500 MG
500 TABLET ORAL DAILY
Status: DISCONTINUED | OUTPATIENT
Start: 2025-08-08 | End: 2025-08-13 | Stop reason: HOSPADM

## 2025-08-08 RX ORDER — NALOXONE HCL 0.4 MG/ML
0.02 VIAL (ML) INJECTION
Status: DISCONTINUED | OUTPATIENT
Start: 2025-08-08 | End: 2025-08-13 | Stop reason: HOSPADM

## 2025-08-08 RX ORDER — ACETAMINOPHEN 325 MG/1
650 TABLET ORAL EVERY 8 HOURS PRN
Status: DISCONTINUED | OUTPATIENT
Start: 2025-08-08 | End: 2025-08-08

## 2025-08-08 RX ORDER — SODIUM CHLORIDE 0.9 % (FLUSH) 0.9 %
10 SYRINGE (ML) INJECTION EVERY 8 HOURS PRN
Status: DISCONTINUED | OUTPATIENT
Start: 2025-08-08 | End: 2025-08-13 | Stop reason: HOSPADM

## 2025-08-08 RX ORDER — SULFAMETHOXAZOLE AND TRIMETHOPRIM 800; 160 MG/1; MG/1
1 TABLET ORAL
Status: COMPLETED | OUTPATIENT
Start: 2025-08-08 | End: 2025-08-08

## 2025-08-08 RX ORDER — AMLODIPINE BESYLATE 5 MG/1
10 TABLET ORAL DAILY
Status: DISCONTINUED | OUTPATIENT
Start: 2025-08-08 | End: 2025-08-13 | Stop reason: HOSPADM

## 2025-08-08 RX ORDER — GABAPENTIN 300 MG/1
600 CAPSULE ORAL 3 TIMES DAILY
Status: DISCONTINUED | OUTPATIENT
Start: 2025-08-08 | End: 2025-08-13 | Stop reason: HOSPADM

## 2025-08-08 RX ORDER — TALC
6 POWDER (GRAM) TOPICAL NIGHTLY PRN
Status: DISCONTINUED | OUTPATIENT
Start: 2025-08-08 | End: 2025-08-13 | Stop reason: HOSPADM

## 2025-08-08 RX ORDER — FAMOTIDINE 20 MG/1
40 TABLET, FILM COATED ORAL 2 TIMES DAILY
Status: DISCONTINUED | OUTPATIENT
Start: 2025-08-08 | End: 2025-08-13 | Stop reason: HOSPADM

## 2025-08-08 RX ORDER — DICYCLOMINE HYDROCHLORIDE 10 MG/1
10 CAPSULE ORAL DAILY PRN
Status: DISCONTINUED | OUTPATIENT
Start: 2025-08-08 | End: 2025-08-13 | Stop reason: HOSPADM

## 2025-08-08 RX ORDER — IPRATROPIUM BROMIDE AND ALBUTEROL SULFATE 2.5; .5 MG/3ML; MG/3ML
3 SOLUTION RESPIRATORY (INHALATION) EVERY 6 HOURS PRN
Status: DISCONTINUED | OUTPATIENT
Start: 2025-08-08 | End: 2025-08-13 | Stop reason: HOSPADM

## 2025-08-08 RX ORDER — SUCRALFATE 1 G/10ML
1 SUSPENSION ORAL
Status: DISCONTINUED | OUTPATIENT
Start: 2025-08-08 | End: 2025-08-13 | Stop reason: HOSPADM

## 2025-08-08 RX ORDER — HYDROMORPHONE HYDROCHLORIDE 1 MG/ML
1 INJECTION, SOLUTION INTRAMUSCULAR; INTRAVENOUS; SUBCUTANEOUS EVERY 4 HOURS PRN
Status: DISCONTINUED | OUTPATIENT
Start: 2025-08-08 | End: 2025-08-09

## 2025-08-08 RX ORDER — POLYETHYLENE GLYCOL 3350 17 G/17G
17 POWDER, FOR SOLUTION ORAL DAILY
Status: DISCONTINUED | OUTPATIENT
Start: 2025-08-09 | End: 2025-08-08

## 2025-08-08 RX ORDER — PRAZOSIN HYDROCHLORIDE 1 MG/1
1 CAPSULE ORAL NIGHTLY
Status: DISCONTINUED | OUTPATIENT
Start: 2025-08-08 | End: 2025-08-13 | Stop reason: HOSPADM

## 2025-08-08 RX ORDER — TIZANIDINE 4 MG/1
4 TABLET ORAL EVERY 8 HOURS PRN
Status: DISCONTINUED | OUTPATIENT
Start: 2025-08-08 | End: 2025-08-13 | Stop reason: HOSPADM

## 2025-08-08 RX ORDER — ONDANSETRON HYDROCHLORIDE 2 MG/ML
4 INJECTION, SOLUTION INTRAVENOUS EVERY 6 HOURS PRN
Status: DISCONTINUED | OUTPATIENT
Start: 2025-08-08 | End: 2025-08-13 | Stop reason: HOSPADM

## 2025-08-08 RX ORDER — TALC
6 POWDER (GRAM) TOPICAL NIGHTLY PRN
Status: DISCONTINUED | OUTPATIENT
Start: 2025-08-08 | End: 2025-08-08 | Stop reason: SDUPTHER

## 2025-08-08 RX ORDER — BISACODYL 5 MG
5 TABLET, DELAYED RELEASE (ENTERIC COATED) ORAL DAILY PRN
Status: DISCONTINUED | OUTPATIENT
Start: 2025-08-08 | End: 2025-08-13 | Stop reason: HOSPADM

## 2025-08-08 RX ADMIN — DIPHENHYDRAMINE HYDROCHLORIDE 25 MG: 25 CAPSULE ORAL at 11:08

## 2025-08-08 RX ADMIN — ACETAMINOPHEN 1000 MG: 500 TABLET, FILM COATED ORAL at 09:08

## 2025-08-08 RX ADMIN — OXYCODONE HYDROCHLORIDE AND ACETAMINOPHEN 500 MG: 500 TABLET ORAL at 06:08

## 2025-08-08 RX ADMIN — SENNOSIDES, DOCUSATE SODIUM 1 TABLET: 50; 8.6 TABLET, FILM COATED ORAL at 09:08

## 2025-08-08 RX ADMIN — HYDROMORPHONE HYDROCHLORIDE 2 MG: 2 INJECTION INTRAMUSCULAR; INTRAVENOUS; SUBCUTANEOUS at 05:08

## 2025-08-08 RX ADMIN — SODIUM CHLORIDE, POTASSIUM CHLORIDE, SODIUM LACTATE AND CALCIUM CHLORIDE 1000 ML: 600; 310; 30; 20 INJECTION, SOLUTION INTRAVENOUS at 10:08

## 2025-08-08 RX ADMIN — SULFAMETHOXAZOLE AND TRIMETHOPRIM 1 TABLET: 800; 160 TABLET ORAL at 05:08

## 2025-08-08 RX ADMIN — GABAPENTIN 600 MG: 300 CAPSULE ORAL at 09:08

## 2025-08-08 RX ADMIN — ONDANSETRON 4 MG: 2 INJECTION INTRAMUSCULAR; INTRAVENOUS at 10:08

## 2025-08-08 RX ADMIN — FAMOTIDINE 40 MG: 20 TABLET, FILM COATED ORAL at 09:08

## 2025-08-08 RX ADMIN — HYDROMORPHONE HYDROCHLORIDE 2 MG: 2 INJECTION INTRAMUSCULAR; INTRAVENOUS; SUBCUTANEOUS at 10:08

## 2025-08-08 RX ADMIN — HYDROMORPHONE HYDROCHLORIDE 2 MG: 2 INJECTION INTRAMUSCULAR; INTRAVENOUS; SUBCUTANEOUS at 12:08

## 2025-08-08 RX ADMIN — SODIUM CHLORIDE, POTASSIUM CHLORIDE, SODIUM LACTATE AND CALCIUM CHLORIDE: 600; 310; 30; 20 INJECTION, SOLUTION INTRAVENOUS at 09:08

## 2025-08-08 RX ADMIN — DIPHENHYDRAMINE HYDROCHLORIDE 12.5 MG: 50 INJECTION INTRAMUSCULAR; INTRAVENOUS at 12:08

## 2025-08-08 RX ADMIN — TIZANIDINE 4 MG: 4 TABLET ORAL at 10:08

## 2025-08-08 RX ADMIN — METOPROLOL TARTRATE 25 MG: 25 TABLET, FILM COATED ORAL at 09:08

## 2025-08-08 RX ADMIN — SODIUM CHLORIDE, POTASSIUM CHLORIDE, SODIUM LACTATE AND CALCIUM CHLORIDE: 600; 310; 30; 20 INJECTION, SOLUTION INTRAVENOUS at 06:08

## 2025-08-08 RX ADMIN — DIPHENHYDRAMINE HYDROCHLORIDE 12.5 MG: 50 INJECTION, SOLUTION INTRAMUSCULAR; INTRAVENOUS at 09:08

## 2025-08-08 RX ADMIN — HYDROMORPHONE HYDROCHLORIDE 1 MG: 1 INJECTION, SOLUTION INTRAMUSCULAR; INTRAVENOUS; SUBCUTANEOUS at 09:08

## 2025-08-08 RX ADMIN — PRAZOSIN HYDROCHLORIDE 1 MG: 1 CAPSULE ORAL at 09:08

## 2025-08-08 RX ADMIN — OXYCODONE AND ACETAMINOPHEN 1 TABLET: 325; 10 TABLET ORAL at 06:08

## 2025-08-08 RX ADMIN — AMLODIPINE BESYLATE 10 MG: 5 TABLET ORAL at 06:08

## 2025-08-08 RX ADMIN — DIPHENHYDRAMINE HYDROCHLORIDE 12.5 MG: 50 INJECTION INTRAMUSCULAR; INTRAVENOUS at 06:08

## 2025-08-08 RX ADMIN — SUCRALFATE ORAL 1 G: 1 SUSPENSION ORAL at 09:08

## 2025-08-08 RX ADMIN — POTASSIUM BICARBONATE 40 MEQ: 391 TABLET, EFFERVESCENT ORAL at 06:08

## 2025-08-08 NOTE — TELEPHONE ENCOUNTER
Patient called to report that right ankle with continued swelling. Patient seen in [facility] ER yesterday was evaluated, treated and discharged.    Patient advised that we did not have an urgent care visit available for today and that if pain/swelling continues to return to ER for evaluation

## 2025-08-08 NOTE — TELEPHONE ENCOUNTER
Copied from CRM #[id number]. Topic: Appointments - Same Day Access  >> Aug 8, 2025  6:08 AM Evelyn wrote:  Type:  Same Day Appointment Request    Name of Caller: Pt called to schedule a Same Day Appt today for a follow up visit and medication refill for pain meds and would like a call back this morning asap  Best Call Back Number: [phone]

## 2025-08-09 LAB
ABSOLUTE EOSINOPHIL (OHS): 0.25 K/UL
ABSOLUTE MONOCYTE (OHS): 0.71 K/UL (ref 0.3–1)
ABSOLUTE NEUTROPHIL COUNT (OHS): 1.34 K/UL (ref 1.8–7.7)
ALBUMIN SERPL BCP-MCNC: 3.2 G/DL (ref 3.5–5.2)
ALP SERPL-CCNC: 77 UNIT/L (ref 40–150)
ALT SERPL W/O P-5'-P-CCNC: 14 UNIT/L (ref 10–44)
ANION GAP (OHS): 4 MMOL/L (ref 8–16)
AST SERPL-CCNC: 34 UNIT/L (ref 11–45)
BACTERIA #/AREA URNS AUTO: ABNORMAL /HPF
BASOPHILS # BLD AUTO: 0.03 K/UL
BASOPHILS NFR BLD AUTO: 0.6 %
BILIRUB SERPL-MCNC: 0.6 MG/DL (ref 0.1–1)
BILIRUB UR QL STRIP.AUTO: NEGATIVE
BUN SERPL-MCNC: 5 MG/DL (ref 6–20)
CALCIUM SERPL-MCNC: 9 MG/DL (ref 8.7–10.5)
CHLORIDE SERPL-SCNC: 106 MMOL/L (ref 95–110)
CLARITY UR: CLEAR
CO2 SERPL-SCNC: 32 MMOL/L (ref 23–29)
COLOR UR AUTO: YELLOW
CREAT SERPL-MCNC: 1 MG/DL (ref 0.5–1.4)
ERYTHROCYTE [DISTWIDTH] IN BLOOD BY AUTOMATED COUNT: 22.5 % (ref 11.5–14.5)
GFR SERPLBLD CREATININE-BSD FMLA CKD-EPI: >60 ML/MIN/1.73/M2
GLUCOSE SERPL-MCNC: 76 MG/DL (ref 70–110)
GLUCOSE UR QL STRIP: NEGATIVE
HCT VFR BLD AUTO: 26.9 % (ref 37–48.5)
HGB BLD-MCNC: 8.6 GM/DL (ref 12–16)
HGB UR QL STRIP: NEGATIVE
HOLD SPECIMEN: NORMAL
IMM GRANULOCYTES # BLD AUTO: 0 K/UL (ref 0–0.04)
IMM GRANULOCYTES NFR BLD AUTO: 0 % (ref 0–0.5)
INR PPP: 1.5 (ref 0.8–1.2)
KETONES UR QL STRIP: NEGATIVE
LEUKOCYTE ESTERASE UR QL STRIP: ABNORMAL
LYMPHOCYTES # BLD AUTO: 3.01 K/UL (ref 1–4.8)
MAGNESIUM SERPL-MCNC: 1.9 MG/DL (ref 1.6–2.6)
MCH RBC QN AUTO: 21.5 PG (ref 27–31)
MCHC RBC AUTO-ENTMCNC: 32 G/DL (ref 32–36)
MCV RBC AUTO: 67 FL (ref 82–98)
MICROSCOPIC COMMENT: ABNORMAL
NITRITE UR QL STRIP: NEGATIVE
NUCLEATED RBC (/100WBC) (OHS): 1 /100 WBC
PH UR STRIP: 6 [PH]
PHOSPHATE SERPL-MCNC: 3.3 MG/DL (ref 2.7–4.5)
PLATELET # BLD AUTO: 296 K/UL (ref 150–450)
PMV BLD AUTO: 8.9 FL (ref 9.2–12.9)
POCT GLUCOSE: 102 MG/DL (ref 70–110)
POTASSIUM SERPL-SCNC: 3.8 MMOL/L (ref 3.5–5.1)
PROT SERPL-MCNC: 6.1 GM/DL (ref 6–8.4)
PROT UR QL STRIP: NEGATIVE
PROTHROMBIN TIME: 16.7 SECONDS (ref 9–12.5)
RBC # BLD AUTO: 4 M/UL (ref 4–5.4)
RBC #/AREA URNS AUTO: <1 /HPF (ref 0–4)
RELATIVE EOSINOPHIL (OHS): 4.7 %
RELATIVE LYMPHOCYTE (OHS): 56.4 % (ref 18–48)
RELATIVE MONOCYTE (OHS): 13.3 % (ref 4–15)
RELATIVE NEUTROPHIL (OHS): 25 % (ref 38–73)
SODIUM SERPL-SCNC: 142 MMOL/L (ref 136–145)
SP GR UR STRIP: 1.01
SQUAMOUS #/AREA URNS AUTO: 1 /HPF
UROBILINOGEN UR STRIP-ACNC: NEGATIVE EU/DL
WBC # BLD AUTO: 5.34 K/UL (ref 3.9–12.7)
WBC #/AREA URNS AUTO: 14 /HPF (ref 0–5)

## 2025-08-09 PROCEDURE — 25000242 PHARM REV CODE 250 ALT 637 W/ HCPCS: Mod: HCNC | Performed by: NURSE PRACTITIONER

## 2025-08-09 PROCEDURE — 80053 COMPREHEN METABOLIC PANEL: CPT | Mod: HCNC | Performed by: NURSE PRACTITIONER

## 2025-08-09 PROCEDURE — 63600175 PHARM REV CODE 636 W HCPCS: Mod: HCNC | Performed by: NURSE PRACTITIONER

## 2025-08-09 PROCEDURE — 63600175 PHARM REV CODE 636 W HCPCS: Mod: HCNC | Performed by: INTERNAL MEDICINE

## 2025-08-09 PROCEDURE — 25000003 PHARM REV CODE 250: Mod: HCNC | Performed by: INTERNAL MEDICINE

## 2025-08-09 PROCEDURE — 81003 URINALYSIS AUTO W/O SCOPE: CPT | Mod: HCNC | Performed by: INTERNAL MEDICINE

## 2025-08-09 PROCEDURE — 25000003 PHARM REV CODE 250: Mod: HCNC | Performed by: NURSE PRACTITIONER

## 2025-08-09 PROCEDURE — 85025 COMPLETE CBC W/AUTO DIFF WBC: CPT | Mod: HCNC | Performed by: NURSE PRACTITIONER

## 2025-08-09 PROCEDURE — 21400001 HC TELEMETRY ROOM: Mod: HCNC

## 2025-08-09 PROCEDURE — 87086 URINE CULTURE/COLONY COUNT: CPT | Mod: HCNC | Performed by: INTERNAL MEDICINE

## 2025-08-09 PROCEDURE — 84100 ASSAY OF PHOSPHORUS: CPT | Mod: HCNC | Performed by: NURSE PRACTITIONER

## 2025-08-09 PROCEDURE — 85610 PROTHROMBIN TIME: CPT | Mod: HCNC | Performed by: NURSE PRACTITIONER

## 2025-08-09 PROCEDURE — 83735 ASSAY OF MAGNESIUM: CPT | Mod: HCNC | Performed by: NURSE PRACTITIONER

## 2025-08-09 RX ORDER — HYDROMORPHONE HYDROCHLORIDE 2 MG/ML
2 INJECTION, SOLUTION INTRAMUSCULAR; INTRAVENOUS; SUBCUTANEOUS
Status: DISCONTINUED | OUTPATIENT
Start: 2025-08-09 | End: 2025-08-12

## 2025-08-09 RX ORDER — DIPHENHYDRAMINE HYDROCHLORIDE 50 MG/ML
12.5 INJECTION, SOLUTION INTRAMUSCULAR; INTRAVENOUS
Status: DISCONTINUED | OUTPATIENT
Start: 2025-08-09 | End: 2025-08-11

## 2025-08-09 RX ORDER — OXYCODONE HYDROCHLORIDE 10 MG/1
10 TABLET ORAL EVERY 6 HOURS
Refills: 0 | Status: DISCONTINUED | OUTPATIENT
Start: 2025-08-09 | End: 2025-08-13 | Stop reason: HOSPADM

## 2025-08-09 RX ORDER — MUPIROCIN 20 MG/G
OINTMENT TOPICAL 2 TIMES DAILY
Status: DISCONTINUED | OUTPATIENT
Start: 2025-08-09 | End: 2025-08-13 | Stop reason: HOSPADM

## 2025-08-09 RX ADMIN — GABAPENTIN 600 MG: 300 CAPSULE ORAL at 09:08

## 2025-08-09 RX ADMIN — ACETAMINOPHEN 1000 MG: 500 TABLET, FILM COATED ORAL at 09:08

## 2025-08-09 RX ADMIN — DIPHENHYDRAMINE HYDROCHLORIDE 12.5 MG: 50 INJECTION, SOLUTION INTRAMUSCULAR; INTRAVENOUS at 09:08

## 2025-08-09 RX ADMIN — SENNOSIDES, DOCUSATE SODIUM 1 TABLET: 50; 8.6 TABLET, FILM COATED ORAL at 09:08

## 2025-08-09 RX ADMIN — FAMOTIDINE 40 MG: 20 TABLET, FILM COATED ORAL at 09:08

## 2025-08-09 RX ADMIN — HYDROMORPHONE HYDROCHLORIDE 2 MG: 2 INJECTION INTRAMUSCULAR; INTRAVENOUS; SUBCUTANEOUS at 09:08

## 2025-08-09 RX ADMIN — FOLIC ACID 1 MG: 1 TABLET ORAL at 09:08

## 2025-08-09 RX ADMIN — SUCRALFATE ORAL 1 G: 1 SUSPENSION ORAL at 05:08

## 2025-08-09 RX ADMIN — DIPHENHYDRAMINE HYDROCHLORIDE 12.5 MG: 50 INJECTION, SOLUTION INTRAMUSCULAR; INTRAVENOUS at 05:08

## 2025-08-09 RX ADMIN — HYDROXYUREA 1000 MG: 500 CAPSULE ORAL at 09:08

## 2025-08-09 RX ADMIN — DIPHENHYDRAMINE HYDROCHLORIDE 12.5 MG: 50 INJECTION, SOLUTION INTRAMUSCULAR; INTRAVENOUS at 12:08

## 2025-08-09 RX ADMIN — TIZANIDINE 4 MG: 4 TABLET ORAL at 09:08

## 2025-08-09 RX ADMIN — SUCRALFATE ORAL 1 G: 1 SUSPENSION ORAL at 11:08

## 2025-08-09 RX ADMIN — DIPHENHYDRAMINE HYDROCHLORIDE 12.5 MG: 50 INJECTION, SOLUTION INTRAMUSCULAR; INTRAVENOUS at 08:08

## 2025-08-09 RX ADMIN — SODIUM CHLORIDE, POTASSIUM CHLORIDE, SODIUM LACTATE AND CALCIUM CHLORIDE: 600; 310; 30; 20 INJECTION, SOLUTION INTRAVENOUS at 03:08

## 2025-08-09 RX ADMIN — HYDROMORPHONE HYDROCHLORIDE 1 MG: 1 INJECTION, SOLUTION INTRAMUSCULAR; INTRAVENOUS; SUBCUTANEOUS at 05:08

## 2025-08-09 RX ADMIN — OXYCODONE HYDROCHLORIDE 10 MG: 10 TABLET ORAL at 11:08

## 2025-08-09 RX ADMIN — WARFARIN SODIUM 4 MG: 2 TABLET ORAL at 05:08

## 2025-08-09 RX ADMIN — METOPROLOL TARTRATE 25 MG: 25 TABLET, FILM COATED ORAL at 09:08

## 2025-08-09 RX ADMIN — PANTOPRAZOLE SODIUM 40 MG: 40 TABLET, DELAYED RELEASE ORAL at 09:08

## 2025-08-09 RX ADMIN — HYDROMORPHONE HYDROCHLORIDE 1 MG: 1 INJECTION, SOLUTION INTRAMUSCULAR; INTRAVENOUS; SUBCUTANEOUS at 01:08

## 2025-08-09 RX ADMIN — SODIUM CHLORIDE, POTASSIUM CHLORIDE, SODIUM LACTATE AND CALCIUM CHLORIDE: 600; 310; 30; 20 INJECTION, SOLUTION INTRAVENOUS at 05:08

## 2025-08-09 RX ADMIN — DIPHENHYDRAMINE HYDROCHLORIDE 12.5 MG: 50 INJECTION, SOLUTION INTRAMUSCULAR; INTRAVENOUS at 11:08

## 2025-08-09 RX ADMIN — FLUTICASONE PROPIONATE 50 MCG: 50 SPRAY, METERED NASAL at 09:08

## 2025-08-09 RX ADMIN — HYDROMORPHONE HYDROCHLORIDE 2 MG: 2 INJECTION INTRAMUSCULAR; INTRAVENOUS; SUBCUTANEOUS at 05:08

## 2025-08-09 RX ADMIN — DIPHENHYDRAMINE HYDROCHLORIDE 12.5 MG: 50 INJECTION, SOLUTION INTRAMUSCULAR; INTRAVENOUS at 01:08

## 2025-08-09 RX ADMIN — DICYCLOMINE HYDROCHLORIDE 10 MG: 10 CAPSULE ORAL at 11:08

## 2025-08-09 RX ADMIN — SUCRALFATE ORAL 1 G: 1 SUSPENSION ORAL at 09:08

## 2025-08-09 RX ADMIN — FLUOXETINE HYDROCHLORIDE 80 MG: 20 CAPSULE ORAL at 09:08

## 2025-08-09 RX ADMIN — HYDROMORPHONE HYDROCHLORIDE 2 MG: 2 INJECTION INTRAMUSCULAR; INTRAVENOUS; SUBCUTANEOUS at 12:08

## 2025-08-09 RX ADMIN — ACETAMINOPHEN 1000 MG: 500 TABLET, FILM COATED ORAL at 01:08

## 2025-08-09 RX ADMIN — HYDROMORPHONE HYDROCHLORIDE 1 MG: 1 INJECTION, SOLUTION INTRAMUSCULAR; INTRAVENOUS; SUBCUTANEOUS at 08:08

## 2025-08-09 RX ADMIN — OXYCODONE HYDROCHLORIDE AND ACETAMINOPHEN 500 MG: 500 TABLET ORAL at 05:08

## 2025-08-09 RX ADMIN — PRAZOSIN HYDROCHLORIDE 1 MG: 1 CAPSULE ORAL at 09:08

## 2025-08-09 RX ADMIN — AMLODIPINE BESYLATE 10 MG: 5 TABLET ORAL at 09:08

## 2025-08-09 RX ADMIN — MUPIROCIN: 20 OINTMENT TOPICAL at 09:08

## 2025-08-09 RX ADMIN — GABAPENTIN 600 MG: 300 CAPSULE ORAL at 03:08

## 2025-08-10 PROBLEM — D63.8 ANEMIA IN OTHER CHRONIC DISEASES CLASSIFIED ELSEWHERE: Status: ACTIVE | Noted: 2024-08-22

## 2025-08-10 LAB
ABSOLUTE EOSINOPHIL (OHS): 0.18 K/UL
ABSOLUTE MONOCYTE (OHS): 0.68 K/UL (ref 0.3–1)
ABSOLUTE NEUTROPHIL COUNT (OHS): 1.78 K/UL (ref 1.8–7.7)
BASOPHILS # BLD AUTO: 0.02 K/UL
BASOPHILS NFR BLD AUTO: 0.4 %
ERYTHROCYTE [DISTWIDTH] IN BLOOD BY AUTOMATED COUNT: 21.9 % (ref 11.5–14.5)
HCT VFR BLD AUTO: 24 % (ref 37–48.5)
HGB BLD-MCNC: 7.6 GM/DL (ref 12–16)
HOLD SPECIMEN: NORMAL
IMM GRANULOCYTES # BLD AUTO: 0.01 K/UL (ref 0–0.04)
IMM GRANULOCYTES NFR BLD AUTO: 0.2 % (ref 0–0.5)
INDIRECT COOMBS: NORMAL
INR PPP: 1.7 (ref 0.8–1.2)
LYMPHOCYTES # BLD AUTO: 2.32 K/UL (ref 1–4.8)
MCH RBC QN AUTO: 21.3 PG (ref 27–31)
MCHC RBC AUTO-ENTMCNC: 31.7 G/DL (ref 32–36)
MCV RBC AUTO: 67 FL (ref 82–98)
NUCLEATED RBC (/100WBC) (OHS): 2 /100 WBC
PLATELET # BLD AUTO: 255 K/UL (ref 150–450)
PMV BLD AUTO: 9 FL (ref 9.2–12.9)
PROTHROMBIN TIME: 18.3 SECONDS (ref 9–12.5)
RBC # BLD AUTO: 3.56 M/UL (ref 4–5.4)
RELATIVE EOSINOPHIL (OHS): 3.6 %
RELATIVE LYMPHOCYTE (OHS): 46.5 % (ref 18–48)
RELATIVE MONOCYTE (OHS): 13.6 % (ref 4–15)
RELATIVE NEUTROPHIL (OHS): 35.7 % (ref 38–73)
RH BLD: NORMAL
SPECIMEN OUTDATE: NORMAL
WBC # BLD AUTO: 4.99 K/UL (ref 3.9–12.7)

## 2025-08-10 PROCEDURE — 85610 PROTHROMBIN TIME: CPT | Mod: HCNC | Performed by: INTERNAL MEDICINE

## 2025-08-10 PROCEDURE — 25000003 PHARM REV CODE 250: Mod: HCNC | Performed by: INTERNAL MEDICINE

## 2025-08-10 PROCEDURE — 63600175 PHARM REV CODE 636 W HCPCS: Mod: HCNC | Performed by: NURSE PRACTITIONER

## 2025-08-10 PROCEDURE — 21400001 HC TELEMETRY ROOM: Mod: HCNC

## 2025-08-10 PROCEDURE — 63600175 PHARM REV CODE 636 W HCPCS: Mod: HCNC | Performed by: INTERNAL MEDICINE

## 2025-08-10 PROCEDURE — 25000003 PHARM REV CODE 250: Mod: HCNC | Performed by: NURSE PRACTITIONER

## 2025-08-10 PROCEDURE — 86850 RBC ANTIBODY SCREEN: CPT | Mod: HCNC | Performed by: INTERNAL MEDICINE

## 2025-08-10 PROCEDURE — 85025 COMPLETE CBC W/AUTO DIFF WBC: CPT | Mod: HCNC | Performed by: NURSE PRACTITIONER

## 2025-08-10 RX ADMIN — HYDROXYUREA 1000 MG: 500 CAPSULE ORAL at 09:08

## 2025-08-10 RX ADMIN — DIPHENHYDRAMINE HYDROCHLORIDE 12.5 MG: 50 INJECTION, SOLUTION INTRAMUSCULAR; INTRAVENOUS at 03:08

## 2025-08-10 RX ADMIN — WARFARIN SODIUM 4 MG: 2 TABLET ORAL at 05:08

## 2025-08-10 RX ADMIN — PANTOPRAZOLE SODIUM 40 MG: 40 TABLET, DELAYED RELEASE ORAL at 09:08

## 2025-08-10 RX ADMIN — HYDROMORPHONE HYDROCHLORIDE 2 MG: 2 INJECTION INTRAMUSCULAR; INTRAVENOUS; SUBCUTANEOUS at 09:08

## 2025-08-10 RX ADMIN — HYDROMORPHONE HYDROCHLORIDE 2 MG: 2 INJECTION INTRAMUSCULAR; INTRAVENOUS; SUBCUTANEOUS at 03:08

## 2025-08-10 RX ADMIN — DIPHENHYDRAMINE HYDROCHLORIDE 12.5 MG: 50 INJECTION, SOLUTION INTRAMUSCULAR; INTRAVENOUS at 09:08

## 2025-08-10 RX ADMIN — SENNOSIDES, DOCUSATE SODIUM 1 TABLET: 50; 8.6 TABLET, FILM COATED ORAL at 09:08

## 2025-08-10 RX ADMIN — DIPHENHYDRAMINE HYDROCHLORIDE 12.5 MG: 50 INJECTION, SOLUTION INTRAMUSCULAR; INTRAVENOUS at 06:08

## 2025-08-10 RX ADMIN — FLUOXETINE HYDROCHLORIDE 80 MG: 20 CAPSULE ORAL at 09:08

## 2025-08-10 RX ADMIN — OXYCODONE HYDROCHLORIDE 10 MG: 10 TABLET ORAL at 12:08

## 2025-08-10 RX ADMIN — FOLIC ACID 1 MG: 1 TABLET ORAL at 09:08

## 2025-08-10 RX ADMIN — HYDROMORPHONE HYDROCHLORIDE 2 MG: 2 INJECTION INTRAMUSCULAR; INTRAVENOUS; SUBCUTANEOUS at 06:08

## 2025-08-10 RX ADMIN — DIPHENHYDRAMINE HYDROCHLORIDE 12.5 MG: 50 INJECTION, SOLUTION INTRAMUSCULAR; INTRAVENOUS at 01:08

## 2025-08-10 RX ADMIN — FAMOTIDINE 40 MG: 20 TABLET, FILM COATED ORAL at 09:08

## 2025-08-10 RX ADMIN — ACETAMINOPHEN 1000 MG: 500 TABLET, FILM COATED ORAL at 03:08

## 2025-08-10 RX ADMIN — FLUTICASONE PROPIONATE 50 MCG: 50 SPRAY, METERED NASAL at 09:08

## 2025-08-10 RX ADMIN — OXYCODONE HYDROCHLORIDE AND ACETAMINOPHEN 500 MG: 500 TABLET ORAL at 05:08

## 2025-08-10 RX ADMIN — HYDROMORPHONE HYDROCHLORIDE 2 MG: 2 INJECTION INTRAMUSCULAR; INTRAVENOUS; SUBCUTANEOUS at 01:08

## 2025-08-10 RX ADMIN — ACETAMINOPHEN 1000 MG: 500 TABLET, FILM COATED ORAL at 09:08

## 2025-08-10 RX ADMIN — TIZANIDINE 4 MG: 4 TABLET ORAL at 08:08

## 2025-08-10 RX ADMIN — HYDROMORPHONE HYDROCHLORIDE 2 MG: 2 INJECTION INTRAMUSCULAR; INTRAVENOUS; SUBCUTANEOUS at 12:08

## 2025-08-10 RX ADMIN — OXYCODONE HYDROCHLORIDE 10 MG: 10 TABLET ORAL at 05:08

## 2025-08-10 RX ADMIN — GABAPENTIN 600 MG: 300 CAPSULE ORAL at 09:08

## 2025-08-10 RX ADMIN — SUCRALFATE ORAL 1 G: 1 SUSPENSION ORAL at 05:08

## 2025-08-10 RX ADMIN — SUCRALFATE ORAL 1 G: 1 SUSPENSION ORAL at 08:08

## 2025-08-10 RX ADMIN — GABAPENTIN 600 MG: 300 CAPSULE ORAL at 08:08

## 2025-08-10 RX ADMIN — PRAZOSIN HYDROCHLORIDE 1 MG: 1 CAPSULE ORAL at 08:08

## 2025-08-10 RX ADMIN — SENNOSIDES, DOCUSATE SODIUM 1 TABLET: 50; 8.6 TABLET, FILM COATED ORAL at 08:08

## 2025-08-10 RX ADMIN — METOPROLOL TARTRATE 25 MG: 25 TABLET, FILM COATED ORAL at 09:08

## 2025-08-10 RX ADMIN — SODIUM CHLORIDE, POTASSIUM CHLORIDE, SODIUM LACTATE AND CALCIUM CHLORIDE: 600; 310; 30; 20 INJECTION, SOLUTION INTRAVENOUS at 08:08

## 2025-08-10 RX ADMIN — DIPHENHYDRAMINE HYDROCHLORIDE 12.5 MG: 50 INJECTION, SOLUTION INTRAMUSCULAR; INTRAVENOUS at 12:08

## 2025-08-10 RX ADMIN — FAMOTIDINE 40 MG: 20 TABLET, FILM COATED ORAL at 08:08

## 2025-08-10 RX ADMIN — SUCRALFATE ORAL 1 G: 1 SUSPENSION ORAL at 12:08

## 2025-08-10 RX ADMIN — SODIUM CHLORIDE, POTASSIUM CHLORIDE, SODIUM LACTATE AND CALCIUM CHLORIDE: 600; 310; 30; 20 INJECTION, SOLUTION INTRAVENOUS at 01:08

## 2025-08-10 RX ADMIN — GABAPENTIN 600 MG: 300 CAPSULE ORAL at 03:08

## 2025-08-11 LAB
ABSOLUTE EOSINOPHIL (OHS): 0.22 K/UL
ABSOLUTE MONOCYTE (OHS): 0.53 K/UL (ref 0.3–1)
ABSOLUTE NEUTROPHIL COUNT (OHS): 1.69 K/UL (ref 1.8–7.7)
BACTERIA UR CULT: NORMAL
BASOPHILS # BLD AUTO: 0.04 K/UL
BASOPHILS NFR BLD AUTO: 0.8 %
ERYTHROCYTE [DISTWIDTH] IN BLOOD BY AUTOMATED COUNT: 22.1 % (ref 11.5–14.5)
HCT VFR BLD AUTO: 25.3 % (ref 37–48.5)
HGB BLD-MCNC: 8.2 GM/DL (ref 12–16)
IMM GRANULOCYTES # BLD AUTO: 0.01 K/UL (ref 0–0.04)
IMM GRANULOCYTES NFR BLD AUTO: 0.2 % (ref 0–0.5)
INR PPP: 1.7 (ref 0.8–1.2)
LYMPHOCYTES # BLD AUTO: 2.77 K/UL (ref 1–4.8)
MCH RBC QN AUTO: 21.8 PG (ref 27–31)
MCHC RBC AUTO-ENTMCNC: 32.4 G/DL (ref 32–36)
MCV RBC AUTO: 67 FL (ref 82–98)
NUCLEATED RBC (/100WBC) (OHS): 2 /100 WBC
PLATELET # BLD AUTO: 275 K/UL (ref 150–450)
PMV BLD AUTO: 8.8 FL (ref 9.2–12.9)
PROTHROMBIN TIME: 18.5 SECONDS (ref 9–12.5)
RBC # BLD AUTO: 3.77 M/UL (ref 4–5.4)
RELATIVE EOSINOPHIL (OHS): 4.2 %
RELATIVE LYMPHOCYTE (OHS): 52.7 % (ref 18–48)
RELATIVE MONOCYTE (OHS): 10.1 % (ref 4–15)
RELATIVE NEUTROPHIL (OHS): 32 % (ref 38–73)
WBC # BLD AUTO: 5.26 K/UL (ref 3.9–12.7)

## 2025-08-11 PROCEDURE — 85025 COMPLETE CBC W/AUTO DIFF WBC: CPT | Mod: HCNC | Performed by: NURSE PRACTITIONER

## 2025-08-11 PROCEDURE — 25000003 PHARM REV CODE 250: Mod: HCNC | Performed by: NURSE PRACTITIONER

## 2025-08-11 PROCEDURE — 63600175 PHARM REV CODE 636 W HCPCS: Mod: HCNC | Performed by: NURSE PRACTITIONER

## 2025-08-11 PROCEDURE — 11000001 HC ACUTE MED/SURG PRIVATE ROOM: Mod: HCNC

## 2025-08-11 PROCEDURE — 94760 N-INVAS EAR/PLS OXIMETRY 1: CPT | Mod: HCNC

## 2025-08-11 PROCEDURE — 99900035 HC TECH TIME PER 15 MIN (STAT): Mod: HCNC

## 2025-08-11 PROCEDURE — 85610 PROTHROMBIN TIME: CPT | Mod: HCNC | Performed by: INTERNAL MEDICINE

## 2025-08-11 PROCEDURE — 63600175 PHARM REV CODE 636 W HCPCS: Mod: HCNC | Performed by: INTERNAL MEDICINE

## 2025-08-11 PROCEDURE — 25000003 PHARM REV CODE 250: Mod: HCNC | Performed by: INTERNAL MEDICINE

## 2025-08-11 RX ORDER — DIPHENHYDRAMINE HYDROCHLORIDE 50 MG/ML
25 INJECTION, SOLUTION INTRAMUSCULAR; INTRAVENOUS
Status: DISCONTINUED | OUTPATIENT
Start: 2025-08-11 | End: 2025-08-13 | Stop reason: HOSPADM

## 2025-08-11 RX ADMIN — HYDROMORPHONE HYDROCHLORIDE 2 MG: 2 INJECTION INTRAMUSCULAR; INTRAVENOUS; SUBCUTANEOUS at 11:08

## 2025-08-11 RX ADMIN — SUCRALFATE ORAL 1 G: 1 SUSPENSION ORAL at 05:08

## 2025-08-11 RX ADMIN — SUCRALFATE ORAL 1 G: 1 SUSPENSION ORAL at 09:08

## 2025-08-11 RX ADMIN — DIPHENHYDRAMINE HYDROCHLORIDE 25 MG: 50 INJECTION, SOLUTION INTRAMUSCULAR; INTRAVENOUS at 08:08

## 2025-08-11 RX ADMIN — FAMOTIDINE 40 MG: 20 TABLET, FILM COATED ORAL at 08:08

## 2025-08-11 RX ADMIN — WARFARIN SODIUM 4 MG: 2 TABLET ORAL at 05:08

## 2025-08-11 RX ADMIN — OXYCODONE HYDROCHLORIDE 10 MG: 10 TABLET ORAL at 11:08

## 2025-08-11 RX ADMIN — FLUTICASONE PROPIONATE 50 MCG: 50 SPRAY, METERED NASAL at 08:08

## 2025-08-11 RX ADMIN — SENNOSIDES, DOCUSATE SODIUM 1 TABLET: 50; 8.6 TABLET, FILM COATED ORAL at 08:08

## 2025-08-11 RX ADMIN — FLUOXETINE HYDROCHLORIDE 80 MG: 20 CAPSULE ORAL at 11:08

## 2025-08-11 RX ADMIN — TIZANIDINE 4 MG: 4 TABLET ORAL at 09:08

## 2025-08-11 RX ADMIN — FOLIC ACID 1 MG: 1 TABLET ORAL at 08:08

## 2025-08-11 RX ADMIN — ACETAMINOPHEN 1000 MG: 500 TABLET, FILM COATED ORAL at 05:08

## 2025-08-11 RX ADMIN — OXYCODONE HYDROCHLORIDE 10 MG: 10 TABLET ORAL at 05:08

## 2025-08-11 RX ADMIN — DIPHENHYDRAMINE HYDROCHLORIDE 25 MG: 50 INJECTION, SOLUTION INTRAMUSCULAR; INTRAVENOUS at 11:08

## 2025-08-11 RX ADMIN — OXYCODONE HYDROCHLORIDE 10 MG: 10 TABLET ORAL at 06:08

## 2025-08-11 RX ADMIN — TIZANIDINE 4 MG: 4 TABLET ORAL at 08:08

## 2025-08-11 RX ADMIN — PANTOPRAZOLE SODIUM 40 MG: 40 TABLET, DELAYED RELEASE ORAL at 08:08

## 2025-08-11 RX ADMIN — PRAZOSIN HYDROCHLORIDE 1 MG: 1 CAPSULE ORAL at 08:08

## 2025-08-11 RX ADMIN — DIPHENHYDRAMINE HYDROCHLORIDE 12.5 MG: 50 INJECTION, SOLUTION INTRAMUSCULAR; INTRAVENOUS at 03:08

## 2025-08-11 RX ADMIN — GABAPENTIN 600 MG: 300 CAPSULE ORAL at 02:08

## 2025-08-11 RX ADMIN — HYDROMORPHONE HYDROCHLORIDE 2 MG: 2 INJECTION INTRAMUSCULAR; INTRAVENOUS; SUBCUTANEOUS at 05:08

## 2025-08-11 RX ADMIN — GABAPENTIN 600 MG: 300 CAPSULE ORAL at 08:08

## 2025-08-11 RX ADMIN — ACETAMINOPHEN 1000 MG: 500 TABLET, FILM COATED ORAL at 09:08

## 2025-08-11 RX ADMIN — HYDROMORPHONE HYDROCHLORIDE 2 MG: 2 INJECTION INTRAMUSCULAR; INTRAVENOUS; SUBCUTANEOUS at 08:08

## 2025-08-11 RX ADMIN — HYDROMORPHONE HYDROCHLORIDE 2 MG: 2 INJECTION INTRAMUSCULAR; INTRAVENOUS; SUBCUTANEOUS at 02:08

## 2025-08-11 RX ADMIN — DIPHENHYDRAMINE HYDROCHLORIDE 25 MG: 50 INJECTION, SOLUTION INTRAMUSCULAR; INTRAVENOUS at 05:08

## 2025-08-11 RX ADMIN — OXYCODONE HYDROCHLORIDE AND ACETAMINOPHEN 500 MG: 500 TABLET ORAL at 05:08

## 2025-08-11 RX ADMIN — SUCRALFATE ORAL 1 G: 1 SUSPENSION ORAL at 11:08

## 2025-08-11 RX ADMIN — DIPHENHYDRAMINE HYDROCHLORIDE 12.5 MG: 50 INJECTION, SOLUTION INTRAMUSCULAR; INTRAVENOUS at 08:08

## 2025-08-11 RX ADMIN — ACETAMINOPHEN 1000 MG: 500 TABLET, FILM COATED ORAL at 02:08

## 2025-08-11 RX ADMIN — MUPIROCIN: 20 OINTMENT TOPICAL at 09:08

## 2025-08-11 RX ADMIN — HYDROMORPHONE HYDROCHLORIDE 2 MG: 2 INJECTION INTRAMUSCULAR; INTRAVENOUS; SUBCUTANEOUS at 03:08

## 2025-08-11 RX ADMIN — DIPHENHYDRAMINE HYDROCHLORIDE 25 MG: 50 INJECTION, SOLUTION INTRAMUSCULAR; INTRAVENOUS at 02:08

## 2025-08-11 RX ADMIN — METOPROLOL TARTRATE 25 MG: 25 TABLET, FILM COATED ORAL at 08:08

## 2025-08-11 RX ADMIN — SODIUM CHLORIDE, POTASSIUM CHLORIDE, SODIUM LACTATE AND CALCIUM CHLORIDE: 600; 310; 30; 20 INJECTION, SOLUTION INTRAVENOUS at 05:08

## 2025-08-11 RX ADMIN — HYDROXYUREA 1000 MG: 500 CAPSULE ORAL at 08:08

## 2025-08-11 RX ADMIN — AMLODIPINE BESYLATE 10 MG: 5 TABLET ORAL at 08:08

## 2025-08-12 PROBLEM — K59.03 OPIOID-INDUCED CONSTIPATION: Status: ACTIVE | Noted: 2025-08-12

## 2025-08-12 PROBLEM — T40.2X5A OPIOID-INDUCED CONSTIPATION: Status: ACTIVE | Noted: 2025-08-12

## 2025-08-12 LAB
ABSOLUTE EOSINOPHIL (OHS): 0.29 K/UL
ABSOLUTE MONOCYTE (OHS): 0.46 K/UL (ref 0.3–1)
ABSOLUTE NEUTROPHIL COUNT (OHS): 1.26 K/UL (ref 1.8–7.7)
ALBUMIN SERPL BCP-MCNC: 3.2 G/DL (ref 3.5–5.2)
ALP SERPL-CCNC: 69 UNIT/L (ref 40–150)
ALT SERPL W/O P-5'-P-CCNC: 15 UNIT/L (ref 10–44)
ANION GAP (OHS): 4 MMOL/L (ref 8–16)
AST SERPL-CCNC: 29 UNIT/L (ref 11–45)
BASOPHILS # BLD AUTO: 0.04 K/UL
BASOPHILS NFR BLD AUTO: 0.8 %
BILIRUB SERPL-MCNC: 0.3 MG/DL (ref 0.1–1)
BUN SERPL-MCNC: 9 MG/DL (ref 6–20)
CALCIUM SERPL-MCNC: 8.3 MG/DL (ref 8.7–10.5)
CHLORIDE SERPL-SCNC: 106 MMOL/L (ref 95–110)
CO2 SERPL-SCNC: 29 MMOL/L (ref 23–29)
CREAT SERPL-MCNC: 1.1 MG/DL (ref 0.5–1.4)
ERYTHROCYTE [DISTWIDTH] IN BLOOD BY AUTOMATED COUNT: 21.9 % (ref 11.5–14.5)
GFR SERPLBLD CREATININE-BSD FMLA CKD-EPI: >60 ML/MIN/1.73/M2
GLUCOSE SERPL-MCNC: 116 MG/DL (ref 70–110)
HCT VFR BLD AUTO: 24.2 % (ref 37–48.5)
HGB BLD-MCNC: 8 GM/DL (ref 12–16)
IMM GRANULOCYTES # BLD AUTO: 0.01 K/UL (ref 0–0.04)
IMM GRANULOCYTES NFR BLD AUTO: 0.2 % (ref 0–0.5)
INR PPP: 2.4 (ref 0.8–1.2)
LYMPHOCYTES # BLD AUTO: 3.27 K/UL (ref 1–4.8)
MCH RBC QN AUTO: 21.7 PG (ref 27–31)
MCHC RBC AUTO-ENTMCNC: 33.1 G/DL (ref 32–36)
MCV RBC AUTO: 66 FL (ref 82–98)
NUCLEATED RBC (/100WBC) (OHS): 2 /100 WBC
PLATELET # BLD AUTO: 289 K/UL (ref 150–450)
PMV BLD AUTO: 9 FL (ref 9.2–12.9)
POTASSIUM SERPL-SCNC: 3.8 MMOL/L (ref 3.5–5.1)
PROT SERPL-MCNC: 6.2 GM/DL (ref 6–8.4)
PROTHROMBIN TIME: 25.5 SECONDS (ref 9–12.5)
RBC # BLD AUTO: 3.68 M/UL (ref 4–5.4)
RELATIVE EOSINOPHIL (OHS): 5.4 %
RELATIVE LYMPHOCYTE (OHS): 61.4 % (ref 18–48)
RELATIVE MONOCYTE (OHS): 8.6 % (ref 4–15)
RELATIVE NEUTROPHIL (OHS): 23.6 % (ref 38–73)
SODIUM SERPL-SCNC: 139 MMOL/L (ref 136–145)
WBC # BLD AUTO: 5.33 K/UL (ref 3.9–12.7)

## 2025-08-12 PROCEDURE — 11000001 HC ACUTE MED/SURG PRIVATE ROOM: Mod: HCNC

## 2025-08-12 PROCEDURE — 94760 N-INVAS EAR/PLS OXIMETRY 1: CPT | Mod: HCNC

## 2025-08-12 PROCEDURE — 25000003 PHARM REV CODE 250: Mod: HCNC | Performed by: INTERNAL MEDICINE

## 2025-08-12 PROCEDURE — 36415 COLL VENOUS BLD VENIPUNCTURE: CPT | Mod: HCNC | Performed by: HOSPITALIST

## 2025-08-12 PROCEDURE — 63600175 PHARM REV CODE 636 W HCPCS: Mod: HCNC | Performed by: HOSPITALIST

## 2025-08-12 PROCEDURE — 25000003 PHARM REV CODE 250: Mod: HCNC | Performed by: HOSPITALIST

## 2025-08-12 PROCEDURE — 85610 PROTHROMBIN TIME: CPT | Mod: HCNC | Performed by: INTERNAL MEDICINE

## 2025-08-12 PROCEDURE — 84460 ALANINE AMINO (ALT) (SGPT): CPT | Mod: HCNC | Performed by: HOSPITALIST

## 2025-08-12 PROCEDURE — 63600175 PHARM REV CODE 636 W HCPCS: Mod: HCNC | Performed by: INTERNAL MEDICINE

## 2025-08-12 PROCEDURE — 25000003 PHARM REV CODE 250: Mod: HCNC | Performed by: NURSE PRACTITIONER

## 2025-08-12 PROCEDURE — 85025 COMPLETE CBC W/AUTO DIFF WBC: CPT | Mod: HCNC | Performed by: HOSPITALIST

## 2025-08-12 RX ORDER — HYDROMORPHONE HYDROCHLORIDE 2 MG/ML
1.5 INJECTION, SOLUTION INTRAMUSCULAR; INTRAVENOUS; SUBCUTANEOUS
Status: DISCONTINUED | OUTPATIENT
Start: 2025-08-12 | End: 2025-08-13 | Stop reason: HOSPADM

## 2025-08-12 RX ORDER — APIXABAN 5 MG (74)
KIT ORAL
Qty: 74 EACH | Refills: 0 | Status: SHIPPED | OUTPATIENT
Start: 2025-08-12

## 2025-08-12 RX ADMIN — DIPHENHYDRAMINE HYDROCHLORIDE 25 MG: 50 INJECTION, SOLUTION INTRAMUSCULAR; INTRAVENOUS at 05:08

## 2025-08-12 RX ADMIN — TIZANIDINE 4 MG: 4 TABLET ORAL at 08:08

## 2025-08-12 RX ADMIN — METOPROLOL TARTRATE 25 MG: 25 TABLET, FILM COATED ORAL at 08:08

## 2025-08-12 RX ADMIN — SENNOSIDES, DOCUSATE SODIUM 1 TABLET: 50; 8.6 TABLET, FILM COATED ORAL at 08:08

## 2025-08-12 RX ADMIN — PANTOPRAZOLE SODIUM 40 MG: 40 TABLET, DELAYED RELEASE ORAL at 08:08

## 2025-08-12 RX ADMIN — TIZANIDINE 4 MG: 4 TABLET ORAL at 09:08

## 2025-08-12 RX ADMIN — OXYCODONE HYDROCHLORIDE 10 MG: 10 TABLET ORAL at 12:08

## 2025-08-12 RX ADMIN — ACETAMINOPHEN 1000 MG: 500 TABLET, FILM COATED ORAL at 06:08

## 2025-08-12 RX ADMIN — HYDROMORPHONE HYDROCHLORIDE 1.5 MG: 2 INJECTION INTRAMUSCULAR; INTRAVENOUS; SUBCUTANEOUS at 02:08

## 2025-08-12 RX ADMIN — OXYCODONE HYDROCHLORIDE 10 MG: 10 TABLET ORAL at 06:08

## 2025-08-12 RX ADMIN — ACETAMINOPHEN 1000 MG: 500 TABLET, FILM COATED ORAL at 02:08

## 2025-08-12 RX ADMIN — FLUOXETINE HYDROCHLORIDE 80 MG: 20 CAPSULE ORAL at 08:08

## 2025-08-12 RX ADMIN — AMLODIPINE BESYLATE 10 MG: 5 TABLET ORAL at 08:08

## 2025-08-12 RX ADMIN — HYDROMORPHONE HYDROCHLORIDE 2 MG: 2 INJECTION INTRAMUSCULAR; INTRAVENOUS; SUBCUTANEOUS at 11:08

## 2025-08-12 RX ADMIN — DIPHENHYDRAMINE HYDROCHLORIDE 25 MG: 50 INJECTION, SOLUTION INTRAMUSCULAR; INTRAVENOUS at 11:08

## 2025-08-12 RX ADMIN — DIPHENHYDRAMINE HYDROCHLORIDE 25 MG: 50 INJECTION, SOLUTION INTRAMUSCULAR; INTRAVENOUS at 02:08

## 2025-08-12 RX ADMIN — ACETAMINOPHEN 1000 MG: 500 TABLET, FILM COATED ORAL at 09:08

## 2025-08-12 RX ADMIN — DIPHENHYDRAMINE HYDROCHLORIDE 25 MG: 50 INJECTION, SOLUTION INTRAMUSCULAR; INTRAVENOUS at 08:08

## 2025-08-12 RX ADMIN — GABAPENTIN 600 MG: 300 CAPSULE ORAL at 04:08

## 2025-08-12 RX ADMIN — FLUTICASONE PROPIONATE 50 MCG: 50 SPRAY, METERED NASAL at 07:08

## 2025-08-12 RX ADMIN — FAMOTIDINE 40 MG: 20 TABLET, FILM COATED ORAL at 08:08

## 2025-08-12 RX ADMIN — GABAPENTIN 600 MG: 300 CAPSULE ORAL at 08:08

## 2025-08-12 RX ADMIN — DIPHENHYDRAMINE HYDROCHLORIDE 25 MG: 50 INJECTION, SOLUTION INTRAMUSCULAR; INTRAVENOUS at 04:08

## 2025-08-12 RX ADMIN — FOLIC ACID 1 MG: 1 TABLET ORAL at 08:08

## 2025-08-12 RX ADMIN — PRAZOSIN HYDROCHLORIDE 1 MG: 1 CAPSULE ORAL at 08:08

## 2025-08-12 RX ADMIN — SUCRALFATE ORAL 1 G: 1 SUSPENSION ORAL at 08:08

## 2025-08-12 RX ADMIN — SUCRALFATE ORAL 1 G: 1 SUSPENSION ORAL at 11:08

## 2025-08-12 RX ADMIN — HYDROMORPHONE HYDROCHLORIDE 1.5 MG: 2 INJECTION INTRAMUSCULAR; INTRAVENOUS; SUBCUTANEOUS at 05:08

## 2025-08-12 RX ADMIN — SUCRALFATE ORAL 1 G: 1 SUSPENSION ORAL at 06:08

## 2025-08-12 RX ADMIN — HYDROMORPHONE HYDROCHLORIDE 2 MG: 2 INJECTION INTRAMUSCULAR; INTRAVENOUS; SUBCUTANEOUS at 04:08

## 2025-08-12 RX ADMIN — DIPHENHYDRAMINE HYDROCHLORIDE 25 MG: 50 INJECTION, SOLUTION INTRAMUSCULAR; INTRAVENOUS at 07:08

## 2025-08-12 RX ADMIN — APIXABAN 10 MG: 2.5 TABLET, FILM COATED ORAL at 08:08

## 2025-08-12 RX ADMIN — LACTULOSE 30 G: 20 SOLUTION ORAL at 02:08

## 2025-08-12 RX ADMIN — HYDROMORPHONE HYDROCHLORIDE 1.5 MG: 2 INJECTION INTRAMUSCULAR; INTRAVENOUS; SUBCUTANEOUS at 11:08

## 2025-08-12 RX ADMIN — HYDROMORPHONE HYDROCHLORIDE 2 MG: 2 INJECTION INTRAMUSCULAR; INTRAVENOUS; SUBCUTANEOUS at 07:08

## 2025-08-12 RX ADMIN — HYDROMORPHONE HYDROCHLORIDE 1.5 MG: 2 INJECTION INTRAMUSCULAR; INTRAVENOUS; SUBCUTANEOUS at 08:08

## 2025-08-12 RX ADMIN — OXYCODONE HYDROCHLORIDE AND ACETAMINOPHEN 500 MG: 500 TABLET ORAL at 06:08

## 2025-08-12 RX ADMIN — SUCRALFATE ORAL 1 G: 1 SUSPENSION ORAL at 04:08

## 2025-08-12 RX ADMIN — HYDROXYUREA 1000 MG: 500 CAPSULE ORAL at 08:08

## 2025-08-13 VITALS
TEMPERATURE: 99 F | WEIGHT: 242.5 LBS | DIASTOLIC BLOOD PRESSURE: 67 MMHG | BODY MASS INDEX: 44.63 KG/M2 | OXYGEN SATURATION: 97 % | SYSTOLIC BLOOD PRESSURE: 132 MMHG | RESPIRATION RATE: 18 BRPM | HEIGHT: 62 IN | HEART RATE: 73 BPM

## 2025-08-13 LAB
ABSOLUTE EOSINOPHIL (OHS): 0.25 K/UL
ABSOLUTE MONOCYTE (OHS): 0.46 K/UL (ref 0.3–1)
ABSOLUTE NEUTROPHIL COUNT (OHS): 2.07 K/UL (ref 1.8–7.7)
ALBUMIN SERPL BCP-MCNC: 3.2 G/DL (ref 3.5–5.2)
ALP SERPL-CCNC: 77 UNIT/L (ref 40–150)
ALT SERPL W/O P-5'-P-CCNC: 17 UNIT/L (ref 10–44)
ANION GAP (OHS): 4 MMOL/L (ref 8–16)
AST SERPL-CCNC: 42 UNIT/L (ref 11–45)
BASOPHILS # BLD AUTO: 0.02 K/UL
BASOPHILS NFR BLD AUTO: 0.3 %
BILIRUB SERPL-MCNC: 0.3 MG/DL (ref 0.1–1)
BUN SERPL-MCNC: 9 MG/DL (ref 6–20)
CALCIUM SERPL-MCNC: 8.5 MG/DL (ref 8.7–10.5)
CHLORIDE SERPL-SCNC: 106 MMOL/L (ref 95–110)
CO2 SERPL-SCNC: 31 MMOL/L (ref 23–29)
CREAT SERPL-MCNC: 1.1 MG/DL (ref 0.5–1.4)
ERYTHROCYTE [DISTWIDTH] IN BLOOD BY AUTOMATED COUNT: 22 % (ref 11.5–14.5)
GFR SERPLBLD CREATININE-BSD FMLA CKD-EPI: >60 ML/MIN/1.73/M2
GLUCOSE SERPL-MCNC: 70 MG/DL (ref 70–110)
HCT VFR BLD AUTO: 25.7 % (ref 37–48.5)
HGB BLD-MCNC: 8.2 GM/DL (ref 12–16)
IMM GRANULOCYTES # BLD AUTO: 0 K/UL (ref 0–0.04)
IMM GRANULOCYTES NFR BLD AUTO: 0 % (ref 0–0.5)
LYMPHOCYTES # BLD AUTO: 3.12 K/UL (ref 1–4.8)
MAGNESIUM SERPL-MCNC: 2 MG/DL (ref 1.6–2.6)
MCH RBC QN AUTO: 21.2 PG (ref 27–31)
MCHC RBC AUTO-ENTMCNC: 31.9 G/DL (ref 32–36)
MCV RBC AUTO: 67 FL (ref 82–98)
NUCLEATED RBC (/100WBC) (OHS): 2 /100 WBC
PLATELET # BLD AUTO: 318 K/UL (ref 150–450)
PMV BLD AUTO: 8.9 FL (ref 9.2–12.9)
POTASSIUM SERPL-SCNC: 3.8 MMOL/L (ref 3.5–5.1)
PROT SERPL-MCNC: 6.3 GM/DL (ref 6–8.4)
RBC # BLD AUTO: 3.86 M/UL (ref 4–5.4)
RELATIVE EOSINOPHIL (OHS): 4.2 %
RELATIVE LYMPHOCYTE (OHS): 52.7 % (ref 18–48)
RELATIVE MONOCYTE (OHS): 7.8 % (ref 4–15)
RELATIVE NEUTROPHIL (OHS): 35 % (ref 38–73)
SODIUM SERPL-SCNC: 141 MMOL/L (ref 136–145)
WBC # BLD AUTO: 5.92 K/UL (ref 3.9–12.7)

## 2025-08-13 PROCEDURE — 25000003 PHARM REV CODE 250: Mod: HCNC | Performed by: INTERNAL MEDICINE

## 2025-08-13 PROCEDURE — 63600175 PHARM REV CODE 636 W HCPCS: Mod: HCNC | Performed by: HOSPITALIST

## 2025-08-13 PROCEDURE — 83735 ASSAY OF MAGNESIUM: CPT | Mod: HCNC | Performed by: HOSPITALIST

## 2025-08-13 PROCEDURE — 25000003 PHARM REV CODE 250: Mod: HCNC | Performed by: HOSPITALIST

## 2025-08-13 PROCEDURE — 25000003 PHARM REV CODE 250: Mod: HCNC | Performed by: NURSE PRACTITIONER

## 2025-08-13 PROCEDURE — 63600175 PHARM REV CODE 636 W HCPCS: Mod: HCNC | Performed by: INTERNAL MEDICINE

## 2025-08-13 PROCEDURE — 80053 COMPREHEN METABOLIC PANEL: CPT | Mod: HCNC | Performed by: HOSPITALIST

## 2025-08-13 PROCEDURE — 36415 COLL VENOUS BLD VENIPUNCTURE: CPT | Mod: HCNC | Performed by: HOSPITALIST

## 2025-08-13 PROCEDURE — 85025 COMPLETE CBC W/AUTO DIFF WBC: CPT | Mod: HCNC | Performed by: HOSPITALIST

## 2025-08-13 RX ADMIN — FLUOXETINE HYDROCHLORIDE 80 MG: 20 CAPSULE ORAL at 10:08

## 2025-08-13 RX ADMIN — FOLIC ACID 1 MG: 1 TABLET ORAL at 10:08

## 2025-08-13 RX ADMIN — HYDROMORPHONE HYDROCHLORIDE 1.5 MG: 2 INJECTION INTRAMUSCULAR; INTRAVENOUS; SUBCUTANEOUS at 10:08

## 2025-08-13 RX ADMIN — SENNOSIDES, DOCUSATE SODIUM 1 TABLET: 50; 8.6 TABLET, FILM COATED ORAL at 10:08

## 2025-08-13 RX ADMIN — AMLODIPINE BESYLATE 10 MG: 5 TABLET ORAL at 10:08

## 2025-08-13 RX ADMIN — DIPHENHYDRAMINE HYDROCHLORIDE 25 MG: 50 INJECTION, SOLUTION INTRAMUSCULAR; INTRAVENOUS at 01:08

## 2025-08-13 RX ADMIN — APIXABAN 10 MG: 2.5 TABLET, FILM COATED ORAL at 10:08

## 2025-08-13 RX ADMIN — DIPHENHYDRAMINE HYDROCHLORIDE 25 MG: 50 INJECTION, SOLUTION INTRAMUSCULAR; INTRAVENOUS at 10:08

## 2025-08-13 RX ADMIN — HYDROXYUREA 1000 MG: 500 CAPSULE ORAL at 10:08

## 2025-08-13 RX ADMIN — FAMOTIDINE 40 MG: 20 TABLET, FILM COATED ORAL at 10:08

## 2025-08-13 RX ADMIN — PANTOPRAZOLE SODIUM 40 MG: 40 TABLET, DELAYED RELEASE ORAL at 10:08

## 2025-08-13 RX ADMIN — FLUTICASONE PROPIONATE 50 MCG: 50 SPRAY, METERED NASAL at 10:08

## 2025-08-13 RX ADMIN — SUCRALFATE ORAL 1 G: 1 SUSPENSION ORAL at 10:08

## 2025-08-13 RX ADMIN — HYDROMORPHONE HYDROCHLORIDE 1.5 MG: 2 INJECTION INTRAMUSCULAR; INTRAVENOUS; SUBCUTANEOUS at 01:08

## 2025-08-13 RX ADMIN — ACETAMINOPHEN 1000 MG: 500 TABLET, FILM COATED ORAL at 01:08

## 2025-08-13 RX ADMIN — DIPHENHYDRAMINE HYDROCHLORIDE 25 MG: 50 INJECTION, SOLUTION INTRAMUSCULAR; INTRAVENOUS at 06:08

## 2025-08-13 RX ADMIN — SUCRALFATE ORAL 1 G: 1 SUSPENSION ORAL at 05:08

## 2025-08-13 RX ADMIN — OXYCODONE HYDROCHLORIDE 10 MG: 10 TABLET ORAL at 05:08

## 2025-08-13 RX ADMIN — OXYCODONE HYDROCHLORIDE 10 MG: 10 TABLET ORAL at 01:08

## 2025-08-13 RX ADMIN — LACTULOSE 30 G: 20 SOLUTION ORAL at 10:08

## 2025-08-13 RX ADMIN — HYDROMORPHONE HYDROCHLORIDE 1.5 MG: 2 INJECTION INTRAMUSCULAR; INTRAVENOUS; SUBCUTANEOUS at 03:08

## 2025-08-13 RX ADMIN — ACETAMINOPHEN 1000 MG: 500 TABLET, FILM COATED ORAL at 05:08

## 2025-08-13 RX ADMIN — HYDROMORPHONE HYDROCHLORIDE 1.5 MG: 2 INJECTION INTRAMUSCULAR; INTRAVENOUS; SUBCUTANEOUS at 06:08

## 2025-08-13 RX ADMIN — DIPHENHYDRAMINE HYDROCHLORIDE 25 MG: 50 INJECTION, SOLUTION INTRAMUSCULAR; INTRAVENOUS at 03:08

## 2025-08-13 RX ADMIN — METOPROLOL TARTRATE 25 MG: 25 TABLET, FILM COATED ORAL at 10:08

## 2025-08-13 RX ADMIN — GABAPENTIN 600 MG: 300 CAPSULE ORAL at 10:08

## 2025-08-14 ENCOUNTER — PATIENT OUTREACH (OUTPATIENT)
Dept: ADMINISTRATIVE | Facility: CLINIC | Age: 45
End: 2025-08-14
Payer: MEDICARE

## 2025-08-14 ENCOUNTER — ANTI-COAG VISIT (OUTPATIENT)
Dept: CARDIOLOGY | Facility: CLINIC | Age: 45
End: 2025-08-14
Payer: MEDICARE

## 2025-08-14 DIAGNOSIS — Z86.711 HISTORY OF PULMONARY EMBOLISM: Chronic | ICD-10-CM

## 2025-08-14 DIAGNOSIS — Z79.01 WARFARIN ANTICOAGULATION: Primary | ICD-10-CM

## 2025-08-15 ENCOUNTER — OUTPATIENT CASE MANAGEMENT (OUTPATIENT)
Dept: ADMINISTRATIVE | Facility: OTHER | Age: 45
End: 2025-08-15
Payer: MEDICARE

## 2025-08-18 DIAGNOSIS — D57.00 HB-SS DISEASE WITH VASO-OCCLUSIVE PAIN: ICD-10-CM

## 2025-08-18 DIAGNOSIS — M17.11 PRIMARY OSTEOARTHRITIS OF RIGHT KNEE: ICD-10-CM

## 2025-08-19 RX ORDER — OXYCODONE AND ACETAMINOPHEN 10; 325 MG/1; MG/1
1 TABLET ORAL EVERY 6 HOURS PRN
Qty: 120 TABLET | Refills: 0 | Status: SHIPPED | OUTPATIENT
Start: 2025-08-19 | End: 2025-09-18

## 2025-08-21 ENCOUNTER — OUTPATIENT CASE MANAGEMENT (OUTPATIENT)
Dept: ADMINISTRATIVE | Facility: OTHER | Age: 45
End: 2025-08-21
Payer: MEDICARE

## 2025-08-25 DIAGNOSIS — Z00.00 ENCOUNTER FOR MEDICARE ANNUAL WELLNESS EXAM: ICD-10-CM

## 2025-08-26 ENCOUNTER — HOSPITAL ENCOUNTER (INPATIENT)
Facility: OTHER | Age: 45
LOS: 3 days | Discharge: HOME | DRG: 948 | End: 2025-08-29
Attending: EMERGENCY MEDICINE | Admitting: EMERGENCY MEDICINE
Payer: MEDICARE

## 2025-08-26 DIAGNOSIS — S70.01XA CONTUSION OF RIGHT HIP, INITIAL ENCOUNTER: ICD-10-CM

## 2025-08-26 DIAGNOSIS — D69.6 THROMBOCYTOPENIA: ICD-10-CM

## 2025-08-26 DIAGNOSIS — E87.6 HYPOKALEMIA: ICD-10-CM
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
## 2025-08-26 DIAGNOSIS — S06.0X0A CONCUSSION WITHOUT LOSS OF CONSCIOUSNESS, INITIAL ENCOUNTER: ICD-10-CM

## 2025-08-26 DIAGNOSIS — S13.9XXA NECK SPRAIN, INITIAL ENCOUNTER: ICD-10-CM

## 2025-08-26 DIAGNOSIS — R07.9 CHEST PAIN: ICD-10-CM

## 2025-08-26 DIAGNOSIS — D57.00 SICKLE CELL PAIN CRISIS: ICD-10-CM

## 2025-08-26 PROBLEM — D57.1 SICKLE CELL ANEMIA: Status: ACTIVE | Noted: 2025-08-26

## 2025-08-26 PROBLEM — M25.551 RIGHT HIP PAIN: Status: ACTIVE | Noted: 2025-08-26

## 2025-08-26 LAB
ABSOLUTE EOSINOPHIL (OHS): 0.11 K/UL
ABSOLUTE MONOCYTE (OHS): 0.8 K/UL (ref 0.3–1)
ABSOLUTE NEUTROPHIL COUNT (OHS): 5.86 K/UL (ref 1.8–7.7)
ALBUMIN SERPL BCP-MCNC: 3.6 G/DL (ref 3.5–5.2)
ALP SERPL-CCNC: 82 UNIT/L (ref 40–150)
ALT SERPL W/O P-5'-P-CCNC: 17 UNIT/L (ref 10–44)
ANION GAP (OHS): 10 MMOL/L (ref 8–16)
ANISOCYTOSIS BLD QL SMEAR: SLIGHT
AST SERPL-CCNC: 28 UNIT/L (ref 11–45)
B-HCG UR QL: NEGATIVE
BASOPHILS # BLD AUTO: 0.03 K/UL
BASOPHILS NFR BLD AUTO: 0.3 %
BILIRUB DIRECT SERPL-MCNC: 0.1 MG/DL (ref 0.1–0.3)
BILIRUB SERPL-MCNC: 0.4 MG/DL (ref 0.1–1)
BUN SERPL-MCNC: 14 MG/DL (ref 6–20)
CALCIUM SERPL-MCNC: 8.8 MG/DL (ref 8.7–10.5)
CHLORIDE SERPL-SCNC: 108 MMOL/L (ref 95–110)
CO2 SERPL-SCNC: 23 MMOL/L (ref 23–29)
CREAT SERPL-MCNC: 1.1 MG/DL (ref 0.5–1.4)
CTP QC/QA: YES
D DIMER PPP IA.FEU-MCNC: 0.46 MG/L FEU
DACRYOCYTES BLD QL SMEAR: ABNORMAL
ERYTHROCYTE [DISTWIDTH] IN BLOOD BY AUTOMATED COUNT: 23.1 % (ref 11.5–14.5)
FERRITIN SERPL-MCNC: 98 NG/ML (ref 20–300)
FIBRINOGEN PPP-MCNC: 315 MG/DL (ref 182–400)
FOLATE SERPL-MCNC: 12.1 NG/ML (ref 4–24)
GFR SERPLBLD CREATININE-BSD FMLA CKD-EPI: >60 ML/MIN/1.73/M2
GLUCOSE SERPL-MCNC: 138 MG/DL (ref 70–110)
HCT VFR BLD AUTO: 25.8 % (ref 37–48.5)
HGB BLD-MCNC: 8.6 GM/DL (ref 12–16)
HYPOCHROMIA BLD QL SMEAR: ABNORMAL
IMM GRANULOCYTES # BLD AUTO: 0.03 K/UL (ref 0–0.04)
IMM GRANULOCYTES NFR BLD AUTO: 0.3 % (ref 0–0.5)
INR PPP: 0.9 (ref 0.8–1.2)
IRON SATN MFR SERPL: 10 % (ref 20–50)
IRON SERPL-MCNC: 45 UG/DL (ref 30–160)
LDH SERPL-CCNC: 227 U/L (ref 110–260)
LYMPHOCYTES # BLD AUTO: 2.34 K/UL (ref 1–4.8)
MAGNESIUM SERPL-MCNC: 1.8 MG/DL (ref 1.6–2.6)
MCH RBC QN AUTO: 21.8 PG (ref 27–31)
MCHC RBC AUTO-ENTMCNC: 33.3 G/DL (ref 32–36)
MCV RBC AUTO: 65 FL (ref 82–98)
NUCLEATED RBC (/100WBC) (OHS): 1 /100 WBC
OVALOCYTES BLD QL SMEAR: ABNORMAL
PLATELET # BLD AUTO: 63 K/UL (ref 150–450)
PLATELET BLD QL SMEAR: ABNORMAL
PLATELET BLD QL SMEAR: ABNORMAL
PMV BLD AUTO: ABNORMAL FL
POCT GLUCOSE: 130 MG/DL (ref 70–110)
POIKILOCYTOSIS BLD QL SMEAR: ABNORMAL
POLYCHROMASIA BLD QL SMEAR: ABNORMAL
POTASSIUM SERPL-SCNC: 3.2 MMOL/L (ref 3.5–5.1)
PROT SERPL-MCNC: 6.9 GM/DL (ref 6–8.4)
PROTHROMBIN TIME: 10.7 SECONDS (ref 9–12.5)
RBC # BLD AUTO: 3.95 M/UL (ref 4–5.4)
RELATIVE EOSINOPHIL (OHS): 1.2 %
RELATIVE LYMPHOCYTE (OHS): 25.5 % (ref 18–48)
RELATIVE MONOCYTE (OHS): 8.7 % (ref 4–15)
RELATIVE NEUTROPHIL (OHS): 64 % (ref 38–73)
RETICS/RBC NFR AUTO: 0.2 % (ref 0.5–2.5)
SICKLE CELLS BLD QL SMEAR: ABNORMAL
SODIUM SERPL-SCNC: 141 MMOL/L (ref 136–145)
TARGETS BLD QL SMEAR: ABNORMAL
TIBC SERPL-MCNC: 434 UG/DL (ref 250–450)
TRANSFERRIN SERPL-MCNC: 293 MG/DL (ref 200–375)
VIT B12 SERPL-MCNC: 412 PG/ML (ref 210–950)
WBC # BLD AUTO: 9.17 K/UL (ref 3.9–12.7)

## 2025-08-26 PROCEDURE — 82728 ASSAY OF FERRITIN: CPT | Mod: HCNC | Performed by: HOSPITALIST

## 2025-08-26 PROCEDURE — 63600175 PHARM REV CODE 636 W HCPCS: Mod: TB,HCNC | Performed by: HOSPITALIST

## 2025-08-26 PROCEDURE — 84466 ASSAY OF TRANSFERRIN: CPT | Mod: HCNC | Performed by: HOSPITALIST

## 2025-08-26 PROCEDURE — 83735 ASSAY OF MAGNESIUM: CPT | Mod: HCNC | Performed by: EMERGENCY MEDICINE

## 2025-08-26 PROCEDURE — 96374 THER/PROPH/DIAG INJ IV PUSH: CPT | Mod: HCNC

## 2025-08-26 PROCEDURE — 25000003 PHARM REV CODE 250: Mod: HCNC | Performed by: HOSPITALIST

## 2025-08-26 PROCEDURE — 82962 GLUCOSE BLOOD TEST: CPT | Mod: HCNC

## 2025-08-26 PROCEDURE — 82248 BILIRUBIN DIRECT: CPT | Mod: HCNC | Performed by: HOSPITALIST

## 2025-08-26 PROCEDURE — 25000003 PHARM REV CODE 250: Mod: HCNC | Performed by: STUDENT IN AN ORGANIZED HEALTH CARE EDUCATION/TRAINING PROGRAM

## 2025-08-26 PROCEDURE — 63600175 PHARM REV CODE 636 W HCPCS: Mod: HCNC | Performed by: STUDENT IN AN ORGANIZED HEALTH CARE EDUCATION/TRAINING PROGRAM

## 2025-08-26 PROCEDURE — 96376 TX/PRO/DX INJ SAME DRUG ADON: CPT | Mod: HCNC

## 2025-08-26 PROCEDURE — 80053 COMPREHEN METABOLIC PANEL: CPT | Mod: HCNC | Performed by: EMERGENCY MEDICINE

## 2025-08-26 PROCEDURE — 12000002 HC ACUTE/MED SURGE SEMI-PRIVATE ROOM: Mod: HCNC

## 2025-08-26 PROCEDURE — 81025 URINE PREGNANCY TEST: CPT | Mod: HCNC | Performed by: EMERGENCY MEDICINE

## 2025-08-26 PROCEDURE — 85379 FIBRIN DEGRADATION QUANT: CPT | Mod: HCNC | Performed by: HOSPITALIST

## 2025-08-26 PROCEDURE — 85384 FIBRINOGEN ACTIVITY: CPT | Mod: HCNC | Performed by: HOSPITALIST

## 2025-08-26 PROCEDURE — 83615 LACTATE (LD) (LDH) ENZYME: CPT | Mod: HCNC | Performed by: HOSPITALIST

## 2025-08-26 PROCEDURE — 85025 COMPLETE CBC W/AUTO DIFF WBC: CPT | Mod: HCNC | Performed by: EMERGENCY MEDICINE

## 2025-08-26 PROCEDURE — 99285 EMERGENCY DEPT VISIT HI MDM: CPT | Mod: 25,HCNC

## 2025-08-26 PROCEDURE — 85045 AUTOMATED RETICULOCYTE COUNT: CPT | Mod: HCNC | Performed by: EMERGENCY MEDICINE

## 2025-08-26 PROCEDURE — 85610 PROTHROMBIN TIME: CPT | Mod: HCNC | Performed by: HOSPITALIST

## 2025-08-26 PROCEDURE — 82607 VITAMIN B-12: CPT | Mod: HCNC | Performed by: HOSPITALIST

## 2025-08-26 PROCEDURE — 82746 ASSAY OF FOLIC ACID SERUM: CPT | Mod: HCNC | Performed by: HOSPITALIST

## 2025-08-26 RX ORDER — HYDROXYUREA 500 MG/1
1000 CAPSULE ORAL DAILY
Status: DISCONTINUED | OUTPATIENT
Start: 2025-08-26 | End: 2025-08-29 | Stop reason: HOSPADM

## 2025-08-26 RX ORDER — GABAPENTIN 300 MG/1
600 CAPSULE ORAL 3 TIMES DAILY
Status: DISCONTINUED | OUTPATIENT
Start: 2025-08-26 | End: 2025-08-29 | Stop reason: HOSPADM

## 2025-08-26 RX ORDER — NALOXONE HCL 0.4 MG/ML
0.02 VIAL (ML) INJECTION
Status: DISCONTINUED | OUTPATIENT
Start: 2025-08-26 | End: 2025-08-29 | Stop reason: HOSPADM

## 2025-08-26 RX ORDER — DICYCLOMINE HYDROCHLORIDE 10 MG/1
10 CAPSULE ORAL
Status: DISCONTINUED | OUTPATIENT
Start: 2025-08-26 | End: 2025-08-29 | Stop reason: HOSPADM

## 2025-08-26 RX ORDER — LIDOCAINE 50 MG/G
2 PATCH TOPICAL
Status: DISCONTINUED | OUTPATIENT
Start: 2025-08-26 | End: 2025-08-29 | Stop reason: HOSPADM

## 2025-08-26 RX ORDER — METOPROLOL TARTRATE 25 MG/1
25 TABLET, FILM COATED ORAL 2 TIMES DAILY
Status: DISCONTINUED | OUTPATIENT
Start: 2025-08-26 | End: 2025-08-29 | Stop reason: HOSPADM

## 2025-08-26 RX ORDER — LOPERAMIDE HYDROCHLORIDE 2 MG/1
2 CAPSULE ORAL 4 TIMES DAILY PRN
Status: DISCONTINUED | OUTPATIENT
Start: 2025-08-26 | End: 2025-08-29 | Stop reason: HOSPADM

## 2025-08-26 RX ORDER — HYDROMORPHONE HYDROCHLORIDE 1 MG/ML
1 INJECTION, SOLUTION INTRAMUSCULAR; INTRAVENOUS; SUBCUTANEOUS
Status: DISCONTINUED | OUTPATIENT
Start: 2025-08-26 | End: 2025-08-29 | Stop reason: HOSPADM

## 2025-08-26 RX ORDER — ALBUTEROL SULFATE 90 UG/1
2 INHALANT RESPIRATORY (INHALATION) EVERY 6 HOURS PRN
Status: DISCONTINUED | OUTPATIENT
Start: 2025-08-26 | End: 2025-08-29 | Stop reason: HOSPADM

## 2025-08-26 RX ORDER — HYDROMORPHONE HYDROCHLORIDE 1 MG/ML
2 INJECTION, SOLUTION INTRAMUSCULAR; INTRAVENOUS; SUBCUTANEOUS
Status: COMPLETED | OUTPATIENT
Start: 2025-08-26 | End: 2025-08-26

## 2025-08-26 RX ORDER — GLUCAGON 1 MG
1 KIT INJECTION
Status: DISCONTINUED | OUTPATIENT
Start: 2025-08-26 | End: 2025-08-29 | Stop reason: HOSPADM

## 2025-08-26 RX ORDER — METHOCARBAMOL 500 MG/1
1000 TABLET, FILM COATED ORAL
Status: COMPLETED | OUTPATIENT
Start: 2025-08-26 | End: 2025-08-26

## 2025-08-26 RX ORDER — IBUPROFEN 200 MG
24 TABLET ORAL
Status: DISCONTINUED | OUTPATIENT
Start: 2025-08-26 | End: 2025-08-29 | Stop reason: HOSPADM

## 2025-08-26 RX ORDER — IBUPROFEN 200 MG
16 TABLET ORAL
Status: DISCONTINUED | OUTPATIENT
Start: 2025-08-26 | End: 2025-08-29 | Stop reason: HOSPADM

## 2025-08-26 RX ORDER — FOLIC ACID 1 MG/1
1 TABLET ORAL DAILY
Status: DISCONTINUED | OUTPATIENT
Start: 2025-08-26 | End: 2025-08-29 | Stop reason: HOSPADM

## 2025-08-26 RX ORDER — ONDANSETRON HYDROCHLORIDE 2 MG/ML
4 INJECTION, SOLUTION INTRAVENOUS EVERY 4 HOURS PRN
Status: DISCONTINUED | OUTPATIENT
Start: 2025-08-26 | End: 2025-08-29 | Stop reason: HOSPADM

## 2025-08-26 RX ORDER — AMLODIPINE BESYLATE 5 MG/1
10 TABLET ORAL DAILY
Status: DISCONTINUED | OUTPATIENT
Start: 2025-08-26 | End: 2025-08-29 | Stop reason: HOSPADM

## 2025-08-26 RX ORDER — AMOXICILLIN 250 MG
1 CAPSULE ORAL 2 TIMES DAILY
Status: DISCONTINUED | OUTPATIENT
Start: 2025-08-26 | End: 2025-08-29 | Stop reason: HOSPADM

## 2025-08-26 RX ORDER — FAMOTIDINE 20 MG/1
20 TABLET, FILM COATED ORAL 2 TIMES DAILY
Status: DISCONTINUED | OUTPATIENT
Start: 2025-08-26 | End: 2025-08-29 | Stop reason: HOSPADM

## 2025-08-26 RX ORDER — ACETAMINOPHEN 500 MG
1000 TABLET ORAL
Status: COMPLETED | OUTPATIENT
Start: 2025-08-26 | End: 2025-08-26

## 2025-08-26 RX ORDER — SUCRALFATE 1 G/10ML
1 SUSPENSION ORAL
Status: DISCONTINUED | OUTPATIENT
Start: 2025-08-26 | End: 2025-08-29 | Stop reason: HOSPADM

## 2025-08-26 RX ORDER — SODIUM CHLORIDE, SODIUM LACTATE, POTASSIUM CHLORIDE, CALCIUM CHLORIDE 600; 310; 30; 20 MG/100ML; MG/100ML; MG/100ML; MG/100ML
INJECTION, SOLUTION INTRAVENOUS CONTINUOUS
Status: DISCONTINUED | OUTPATIENT
Start: 2025-08-26 | End: 2025-08-26

## 2025-08-26 RX ORDER — SODIUM CHLORIDE 0.9 % (FLUSH) 0.9 %
10 SYRINGE (ML) INJECTION
Status: DISCONTINUED | OUTPATIENT
Start: 2025-08-26 | End: 2025-08-29 | Stop reason: HOSPADM

## 2025-08-26 RX ORDER — PANTOPRAZOLE SODIUM 40 MG/1
40 TABLET, DELAYED RELEASE ORAL DAILY
Status: DISCONTINUED | OUTPATIENT
Start: 2025-08-26 | End: 2025-08-29 | Stop reason: HOSPADM

## 2025-08-26 RX ORDER — TALC
6 POWDER (GRAM) TOPICAL NIGHTLY PRN
Status: DISCONTINUED | OUTPATIENT
Start: 2025-08-26 | End: 2025-08-29 | Stop reason: HOSPADM

## 2025-08-26 RX ORDER — OXYCODONE HYDROCHLORIDE 10 MG/1
10 TABLET ORAL EVERY 6 HOURS PRN
Refills: 0 | Status: DISCONTINUED | OUTPATIENT
Start: 2025-08-26 | End: 2025-08-29 | Stop reason: HOSPADM

## 2025-08-26 RX ORDER — FLUOXETINE 20 MG/1
80 CAPSULE ORAL DAILY
Status: DISCONTINUED | OUTPATIENT
Start: 2025-08-26 | End: 2025-08-29 | Stop reason: HOSPADM

## 2025-08-26 RX ORDER — ACETAMINOPHEN 325 MG/1
650 TABLET ORAL EVERY 8 HOURS
Status: DISCONTINUED | OUTPATIENT
Start: 2025-08-26 | End: 2025-08-29 | Stop reason: HOSPADM

## 2025-08-26 RX ORDER — DIPHENHYDRAMINE HYDROCHLORIDE 50 MG/ML
25 INJECTION, SOLUTION INTRAMUSCULAR; INTRAVENOUS EVERY 4 HOURS PRN
Status: DISCONTINUED | OUTPATIENT
Start: 2025-08-26 | End: 2025-08-28

## 2025-08-26 RX ORDER — ACETAMINOPHEN 325 MG/1
650 TABLET ORAL EVERY 6 HOURS PRN
Status: DISCONTINUED | OUTPATIENT
Start: 2025-08-26 | End: 2025-08-29 | Stop reason: HOSPADM

## 2025-08-26 RX ORDER — DIPHENHYDRAMINE HCL 25 MG
25 CAPSULE ORAL
Status: COMPLETED | OUTPATIENT
Start: 2025-08-26 | End: 2025-08-26

## 2025-08-26 RX ADMIN — LACTULOSE 15 G: 20 SOLUTION ORAL at 08:08

## 2025-08-26 RX ADMIN — METOPROLOL TARTRATE 25 MG: 25 TABLET, FILM COATED ORAL at 08:08

## 2025-08-26 RX ADMIN — LACTULOSE 15 G: 20 SOLUTION ORAL at 03:08

## 2025-08-26 RX ADMIN — POTASSIUM BICARBONATE 50 MEQ: 977.5 TABLET, EFFERVESCENT ORAL at 09:08

## 2025-08-26 RX ADMIN — GABAPENTIN 600 MG: 300 CAPSULE ORAL at 03:08

## 2025-08-26 RX ADMIN — FOLIC ACID 1 MG: 1 TABLET ORAL at 03:08

## 2025-08-26 RX ADMIN — HYDROMORPHONE HYDROCHLORIDE 1 MG: 1 INJECTION, SOLUTION INTRAMUSCULAR; INTRAVENOUS; SUBCUTANEOUS at 10:08

## 2025-08-26 RX ADMIN — AMLODIPINE BESYLATE 10 MG: 5 TABLET ORAL at 03:08

## 2025-08-26 RX ADMIN — ACETAMINOPHEN 1000 MG: 500 TABLET, FILM COATED ORAL at 09:08

## 2025-08-26 RX ADMIN — SUCRALFATE ORAL 1 G: 1 SUSPENSION ORAL at 08:08

## 2025-08-26 RX ADMIN — METHOCARBAMOL 1000 MG: 500 TABLET ORAL at 09:08

## 2025-08-26 RX ADMIN — OXYCODONE HYDROCHLORIDE 10 MG: 10 TABLET ORAL at 04:08

## 2025-08-26 RX ADMIN — FAMOTIDINE 20 MG: 20 TABLET, FILM COATED ORAL at 08:08

## 2025-08-26 RX ADMIN — HYDROMORPHONE HYDROCHLORIDE 2 MG: 1 INJECTION, SOLUTION INTRAMUSCULAR; INTRAVENOUS; SUBCUTANEOUS at 09:08

## 2025-08-26 RX ADMIN — DOCUSATE SODIUM AND SENNOSIDES 1 TABLET: 8.6; 5 TABLET, FILM COATED ORAL at 08:08

## 2025-08-26 RX ADMIN — DIPHENHYDRAMINE HYDROCHLORIDE 25 MG: 50 INJECTION, SOLUTION INTRAMUSCULAR; INTRAVENOUS at 10:08

## 2025-08-26 RX ADMIN — FLUOXETINE HYDROCHLORIDE 80 MG: 20 CAPSULE ORAL at 03:08

## 2025-08-26 RX ADMIN — SUCRALFATE ORAL 1 G: 1 SUSPENSION ORAL at 04:08

## 2025-08-26 RX ADMIN — DIPHENHYDRAMINE HYDROCHLORIDE 25 MG: 25 CAPSULE ORAL at 09:08

## 2025-08-26 RX ADMIN — ALTEPLASE 1 MG: 2.2 INJECTION, POWDER, LYOPHILIZED, FOR SOLUTION INTRAVENOUS at 07:08

## 2025-08-26 RX ADMIN — HYDROXYUREA 1000 MG: 500 CAPSULE ORAL at 03:08

## 2025-08-26 RX ADMIN — PANTOPRAZOLE SODIUM 40 MG: 40 TABLET, DELAYED RELEASE ORAL at 03:08

## 2025-08-26 RX ADMIN — GABAPENTIN 600 MG: 300 CAPSULE ORAL at 08:08

## 2025-08-26 RX ADMIN — HYDROMORPHONE HYDROCHLORIDE 2 MG: 1 INJECTION, SOLUTION INTRAMUSCULAR; INTRAVENOUS; SUBCUTANEOUS at 10:08

## 2025-08-27 ENCOUNTER — EXTERNAL HOME HEALTH (OUTPATIENT)
Dept: HOME HEALTH SERVICES | Facility: HOSPITAL | Age: 45
End: 2025-08-27
Payer: MEDICARE

## 2025-08-27 LAB
ABSOLUTE EOSINOPHIL (OHS): 0.26 K/UL
ABSOLUTE MONOCYTE (OHS): 0.61 K/UL (ref 0.3–1)
ABSOLUTE NEUTROPHIL COUNT (OHS): 2.65 K/UL (ref 1.8–7.7)
ALBUMIN SERPL BCP-MCNC: 3.5 G/DL (ref 3.5–5.2)
ALP SERPL-CCNC: 80 UNIT/L (ref 40–150)
ALT SERPL W/O P-5'-P-CCNC: 12 UNIT/L (ref 10–44)
ANION GAP (OHS): 8 MMOL/L (ref 8–16)
AST SERPL-CCNC: 23 UNIT/L (ref 11–45)
BASOPHILS # BLD AUTO: 0.03 K/UL
BASOPHILS NFR BLD AUTO: 0.4 %
BILIRUB SERPL-MCNC: 0.7 MG/DL (ref 0.1–1)
BUN SERPL-MCNC: 12 MG/DL (ref 6–20)
CALCIUM SERPL-MCNC: 9.4 MG/DL (ref 8.7–10.5)
CHLORIDE SERPL-SCNC: 108 MMOL/L (ref 95–110)
CO2 SERPL-SCNC: 24 MMOL/L (ref 23–29)
CREAT SERPL-MCNC: 1.1 MG/DL (ref 0.5–1.4)
ERYTHROCYTE [DISTWIDTH] IN BLOOD BY AUTOMATED COUNT: 22.8 % (ref 11.5–14.5)
GFR SERPLBLD CREATININE-BSD FMLA CKD-EPI: >60 ML/MIN/1.73/M2
GLUCOSE SERPL-MCNC: 102 MG/DL (ref 70–110)
HCT VFR BLD AUTO: 24.1 % (ref 37–48.5)
HGB BLD-MCNC: 7.8 GM/DL (ref 12–16)
IMM GRANULOCYTES # BLD AUTO: 0.01 K/UL (ref 0–0.04)
IMM GRANULOCYTES NFR BLD AUTO: 0.1 % (ref 0–0.5)
INR PPP: 1 (ref 0.8–1.2)
LYMPHOCYTES # BLD AUTO: 4.53 K/UL (ref 1–4.8)
MAGNESIUM SERPL-MCNC: 2 MG/DL (ref 1.6–2.6)
MCH RBC QN AUTO: 21.4 PG (ref 27–31)
MCHC RBC AUTO-ENTMCNC: 32.4 G/DL (ref 32–36)
MCV RBC AUTO: 66 FL (ref 82–98)
NUCLEATED RBC (/100WBC) (OHS): 2 /100 WBC
PLATELET # BLD AUTO: 57 K/UL (ref 150–450)
PMV BLD AUTO: ABNORMAL FL
POTASSIUM SERPL-SCNC: 3.8 MMOL/L (ref 3.5–5.1)
PROT SERPL-MCNC: 6.7 GM/DL (ref 6–8.4)
PROTHROMBIN TIME: 11.4 SECONDS (ref 9–12.5)
RBC # BLD AUTO: 3.65 M/UL (ref 4–5.4)
RELATIVE EOSINOPHIL (OHS): 3.2 %
RELATIVE LYMPHOCYTE (OHS): 56 % (ref 18–48)
RELATIVE MONOCYTE (OHS): 7.5 % (ref 4–15)
RELATIVE NEUTROPHIL (OHS): 32.8 % (ref 38–73)
SODIUM SERPL-SCNC: 140 MMOL/L (ref 136–145)
WBC # BLD AUTO: 8.09 K/UL (ref 3.9–12.7)

## 2025-08-27 PROCEDURE — 25000003 PHARM REV CODE 250: Mod: HCNC | Performed by: HOSPITALIST

## 2025-08-27 PROCEDURE — 25500020 PHARM REV CODE 255: Mod: HCNC | Performed by: HOSPITALIST

## 2025-08-27 PROCEDURE — 83735 ASSAY OF MAGNESIUM: CPT | Mod: HCNC | Performed by: HOSPITALIST

## 2025-08-27 PROCEDURE — 80053 COMPREHEN METABOLIC PANEL: CPT | Mod: HCNC | Performed by: HOSPITALIST

## 2025-08-27 PROCEDURE — 94799 UNLISTED PULMONARY SVC/PX: CPT | Mod: HCNC

## 2025-08-27 PROCEDURE — 99223 1ST HOSP IP/OBS HIGH 75: CPT | Mod: HCNC,,, | Performed by: INTERNAL MEDICINE

## 2025-08-27 PROCEDURE — 85025 COMPLETE CBC W/AUTO DIFF WBC: CPT | Mod: HCNC | Performed by: HOSPITALIST

## 2025-08-27 PROCEDURE — 21400001 HC TELEMETRY ROOM: Mod: HCNC

## 2025-08-27 PROCEDURE — 11000001 HC ACUTE MED/SURG PRIVATE ROOM: Mod: HCNC

## 2025-08-27 PROCEDURE — 99900035 HC TECH TIME PER 15 MIN (STAT): Mod: HCNC

## 2025-08-27 PROCEDURE — 94760 N-INVAS EAR/PLS OXIMETRY 1: CPT | Mod: HCNC

## 2025-08-27 PROCEDURE — 36415 COLL VENOUS BLD VENIPUNCTURE: CPT | Mod: HCNC | Performed by: HOSPITALIST

## 2025-08-27 PROCEDURE — 63600175 PHARM REV CODE 636 W HCPCS: Mod: HCNC | Performed by: HOSPITALIST

## 2025-08-27 PROCEDURE — 85610 PROTHROMBIN TIME: CPT | Mod: HCNC | Performed by: HOSPITALIST

## 2025-08-27 RX ORDER — HYDROMORPHONE HYDROCHLORIDE 1 MG/ML
1 INJECTION, SOLUTION INTRAMUSCULAR; INTRAVENOUS; SUBCUTANEOUS ONCE
Status: COMPLETED | OUTPATIENT
Start: 2025-08-27 | End: 2025-08-27

## 2025-08-27 RX ADMIN — DIPHENHYDRAMINE HYDROCHLORIDE 25 MG: 50 INJECTION, SOLUTION INTRAMUSCULAR; INTRAVENOUS at 10:08

## 2025-08-27 RX ADMIN — DOCUSATE SODIUM AND SENNOSIDES 1 TABLET: 8.6; 5 TABLET, FILM COATED ORAL at 08:08

## 2025-08-27 RX ADMIN — HYDROMORPHONE HYDROCHLORIDE 1 MG: 1 INJECTION, SOLUTION INTRAMUSCULAR; INTRAVENOUS; SUBCUTANEOUS at 12:08

## 2025-08-27 RX ADMIN — SUCRALFATE ORAL 1 G: 1 SUSPENSION ORAL at 04:08

## 2025-08-27 RX ADMIN — PANTOPRAZOLE SODIUM 40 MG: 40 TABLET, DELAYED RELEASE ORAL at 08:08

## 2025-08-27 RX ADMIN — DOCUSATE SODIUM AND SENNOSIDES 1 TABLET: 8.6; 5 TABLET, FILM COATED ORAL at 09:08

## 2025-08-27 RX ADMIN — FAMOTIDINE 20 MG: 20 TABLET, FILM COATED ORAL at 08:08

## 2025-08-27 RX ADMIN — HYDROMORPHONE HYDROCHLORIDE 1 MG: 1 INJECTION, SOLUTION INTRAMUSCULAR; INTRAVENOUS; SUBCUTANEOUS at 05:08

## 2025-08-27 RX ADMIN — LACTULOSE 15 G: 20 SOLUTION ORAL at 09:08

## 2025-08-27 RX ADMIN — HYDROMORPHONE HYDROCHLORIDE 1 MG: 1 INJECTION, SOLUTION INTRAMUSCULAR; INTRAVENOUS; SUBCUTANEOUS at 10:08

## 2025-08-27 RX ADMIN — ACETAMINOPHEN 650 MG: 325 TABLET ORAL at 09:08

## 2025-08-27 RX ADMIN — FLUOXETINE HYDROCHLORIDE 80 MG: 20 CAPSULE ORAL at 08:08

## 2025-08-27 RX ADMIN — GABAPENTIN 600 MG: 300 CAPSULE ORAL at 08:08

## 2025-08-27 RX ADMIN — FOLIC ACID 1 MG: 1 TABLET ORAL at 08:08

## 2025-08-27 RX ADMIN — ACETAMINOPHEN 650 MG: 325 TABLET ORAL at 06:08

## 2025-08-27 RX ADMIN — HYDROXYUREA 1000 MG: 500 CAPSULE ORAL at 08:08

## 2025-08-27 RX ADMIN — DIPHENHYDRAMINE HYDROCHLORIDE 25 MG: 50 INJECTION, SOLUTION INTRAMUSCULAR; INTRAVENOUS at 05:08

## 2025-08-27 RX ADMIN — DIPHENHYDRAMINE HYDROCHLORIDE 25 MG: 50 INJECTION, SOLUTION INTRAMUSCULAR; INTRAVENOUS at 06:08

## 2025-08-27 RX ADMIN — IOHEXOL 100 ML: 350 INJECTION, SOLUTION INTRAVENOUS at 12:08

## 2025-08-27 RX ADMIN — HYDROMORPHONE HYDROCHLORIDE 1 MG: 1 INJECTION, SOLUTION INTRAMUSCULAR; INTRAVENOUS; SUBCUTANEOUS at 03:08

## 2025-08-27 RX ADMIN — AMLODIPINE BESYLATE 10 MG: 5 TABLET ORAL at 08:08

## 2025-08-27 RX ADMIN — OXYCODONE HYDROCHLORIDE 10 MG: 10 TABLET ORAL at 04:08

## 2025-08-27 RX ADMIN — FAMOTIDINE 20 MG: 20 TABLET, FILM COATED ORAL at 09:08

## 2025-08-27 RX ADMIN — HYDROMORPHONE HYDROCHLORIDE 1 MG: 1 INJECTION, SOLUTION INTRAMUSCULAR; INTRAVENOUS; SUBCUTANEOUS at 09:08

## 2025-08-27 RX ADMIN — GABAPENTIN 600 MG: 300 CAPSULE ORAL at 09:08

## 2025-08-27 RX ADMIN — METOPROLOL TARTRATE 25 MG: 25 TABLET, FILM COATED ORAL at 08:08

## 2025-08-27 RX ADMIN — DIPHENHYDRAMINE HYDROCHLORIDE 25 MG: 50 INJECTION, SOLUTION INTRAMUSCULAR; INTRAVENOUS at 03:08

## 2025-08-27 RX ADMIN — SUCRALFATE ORAL 1 G: 1 SUSPENSION ORAL at 12:08

## 2025-08-27 RX ADMIN — GABAPENTIN 600 MG: 300 CAPSULE ORAL at 03:08

## 2025-08-27 RX ADMIN — ACETAMINOPHEN 650 MG: 325 TABLET ORAL at 03:08

## 2025-08-27 RX ADMIN — SUCRALFATE ORAL 1 G: 1 SUSPENSION ORAL at 06:08

## 2025-08-27 RX ADMIN — SUCRALFATE ORAL 1 G: 1 SUSPENSION ORAL at 09:08

## 2025-08-27 RX ADMIN — HYDROMORPHONE HYDROCHLORIDE 1 MG: 1 INJECTION, SOLUTION INTRAMUSCULAR; INTRAVENOUS; SUBCUTANEOUS at 06:08

## 2025-08-28 ENCOUNTER — DOCUMENT SCAN (OUTPATIENT)
Dept: HOME HEALTH SERVICES | Facility: HOSPITAL | Age: 45
End: 2025-08-28
Payer: MEDICARE

## 2025-08-28 ENCOUNTER — DOCUMENT SCAN (OUTPATIENT)
Dept: HOME HEALTH SERVICES | Facility: HOSPITAL | Age: 45
End: 2025-08-28

## 2025-08-28 LAB
ABSOLUTE EOSINOPHIL (OHS): 0.26 K/UL
ABSOLUTE MONOCYTE (OHS): 0.82 K/UL (ref 0.3–1)
ABSOLUTE NEUTROPHIL COUNT (OHS): 1.88 K/UL (ref 1.8–7.7)
ALBUMIN SERPL BCP-MCNC: 3.3 G/DL (ref 3.5–5.2)
ALP SERPL-CCNC: 73 UNIT/L (ref 40–150)
ALT SERPL W/O P-5'-P-CCNC: 10 UNIT/L (ref 10–44)
ANION GAP (OHS): 6 MMOL/L (ref 8–16)
AST SERPL-CCNC: 19 UNIT/L (ref 11–45)
BASOPHILS # BLD AUTO: 0.04 K/UL
BASOPHILS NFR BLD AUTO: 0.6 %
BILIRUB SERPL-MCNC: 0.4 MG/DL (ref 0.1–1)
BUN SERPL-MCNC: 17 MG/DL (ref 6–20)
CALCIUM SERPL-MCNC: 9.1 MG/DL (ref 8.7–10.5)
CHLORIDE SERPL-SCNC: 109 MMOL/L (ref 95–110)
CO2 SERPL-SCNC: 26 MMOL/L (ref 23–29)
CREAT SERPL-MCNC: 1.3 MG/DL (ref 0.5–1.4)
ERYTHROCYTE [DISTWIDTH] IN BLOOD BY AUTOMATED COUNT: 23.2 % (ref 11.5–14.5)
GFR SERPLBLD CREATININE-BSD FMLA CKD-EPI: 51 ML/MIN/1.73/M2
GLUCOSE SERPL-MCNC: 99 MG/DL (ref 70–110)
HCT VFR BLD AUTO: 22.1 % (ref 37–48.5)
HGB BLD-MCNC: 7.3 GM/DL (ref 12–16)
IMM GRANULOCYTES # BLD AUTO: 0.01 K/UL (ref 0–0.04)
IMM GRANULOCYTES NFR BLD AUTO: 0.2 % (ref 0–0.5)
INR PPP: 1 (ref 0.8–1.2)
LYMPHOCYTES # BLD AUTO: 3.64 K/UL (ref 1–4.8)
MAGNESIUM SERPL-MCNC: 2.1 MG/DL (ref 1.6–2.6)
MCH RBC QN AUTO: 21.6 PG (ref 27–31)
MCHC RBC AUTO-ENTMCNC: 33 G/DL (ref 32–36)
MCV RBC AUTO: 65 FL (ref 82–98)
NUCLEATED RBC (/100WBC) (OHS): 9 /100 WBC
PLATELET # BLD AUTO: 74 K/UL (ref 150–450)
PMV BLD AUTO: ABNORMAL FL
POTASSIUM SERPL-SCNC: 4.1 MMOL/L (ref 3.5–5.1)
PROT SERPL-MCNC: 6.3 GM/DL (ref 6–8.4)
PROTHROMBIN TIME: 10.8 SECONDS (ref 9–12.5)
RBC # BLD AUTO: 3.38 M/UL (ref 4–5.4)
RELATIVE EOSINOPHIL (OHS): 3.9 %
RELATIVE LYMPHOCYTE (OHS): 54.7 % (ref 18–48)
RELATIVE MONOCYTE (OHS): 12.3 % (ref 4–15)
RELATIVE NEUTROPHIL (OHS): 28.3 % (ref 38–73)
SODIUM SERPL-SCNC: 141 MMOL/L (ref 136–145)
WBC # BLD AUTO: 6.65 K/UL (ref 3.9–12.7)

## 2025-08-28 PROCEDURE — 80053 COMPREHEN METABOLIC PANEL: CPT | Mod: HCNC | Performed by: HOSPITALIST

## 2025-08-28 PROCEDURE — 85610 PROTHROMBIN TIME: CPT | Mod: HCNC | Performed by: HOSPITALIST

## 2025-08-28 PROCEDURE — 97162 PT EVAL MOD COMPLEX 30 MIN: CPT | Mod: HCNC

## 2025-08-28 PROCEDURE — 85025 COMPLETE CBC W/AUTO DIFF WBC: CPT | Mod: HCNC | Performed by: HOSPITALIST

## 2025-08-28 PROCEDURE — 21400001 HC TELEMETRY ROOM: Mod: HCNC

## 2025-08-28 PROCEDURE — 83735 ASSAY OF MAGNESIUM: CPT | Mod: HCNC | Performed by: HOSPITALIST

## 2025-08-28 PROCEDURE — 63600175 PHARM REV CODE 636 W HCPCS: Mod: HCNC | Performed by: HOSPITALIST

## 2025-08-28 PROCEDURE — 97165 OT EVAL LOW COMPLEX 30 MIN: CPT | Mod: HCNC

## 2025-08-28 PROCEDURE — 25000003 PHARM REV CODE 250: Mod: HCNC | Performed by: HOSPITALIST

## 2025-08-28 RX ORDER — DIPHENHYDRAMINE HYDROCHLORIDE 50 MG/ML
25 INJECTION, SOLUTION INTRAMUSCULAR; INTRAVENOUS
Status: DISCONTINUED | OUTPATIENT
Start: 2025-08-28 | End: 2025-08-29 | Stop reason: HOSPADM

## 2025-08-28 RX ORDER — HYDROMORPHONE HYDROCHLORIDE 1 MG/ML
1 INJECTION, SOLUTION INTRAMUSCULAR; INTRAVENOUS; SUBCUTANEOUS ONCE
Status: COMPLETED | OUTPATIENT
Start: 2025-08-28 | End: 2025-08-28

## 2025-08-28 RX ADMIN — PANTOPRAZOLE SODIUM 40 MG: 40 TABLET, DELAYED RELEASE ORAL at 09:08

## 2025-08-28 RX ADMIN — SUCRALFATE ORAL 1 G: 1 SUSPENSION ORAL at 12:08

## 2025-08-28 RX ADMIN — HYDROMORPHONE HYDROCHLORIDE 1 MG: 1 INJECTION, SOLUTION INTRAMUSCULAR; INTRAVENOUS; SUBCUTANEOUS at 03:08

## 2025-08-28 RX ADMIN — HYDROMORPHONE HYDROCHLORIDE 1 MG: 1 INJECTION, SOLUTION INTRAMUSCULAR; INTRAVENOUS; SUBCUTANEOUS at 01:08

## 2025-08-28 RX ADMIN — HYDROMORPHONE HYDROCHLORIDE 1 MG: 1 INJECTION, SOLUTION INTRAMUSCULAR; INTRAVENOUS; SUBCUTANEOUS at 11:08

## 2025-08-28 RX ADMIN — ACETAMINOPHEN 650 MG: 325 TABLET ORAL at 05:08

## 2025-08-28 RX ADMIN — SUCRALFATE ORAL 1 G: 1 SUSPENSION ORAL at 08:08

## 2025-08-28 RX ADMIN — SUCRALFATE ORAL 1 G: 1 SUSPENSION ORAL at 05:08

## 2025-08-28 RX ADMIN — METOPROLOL TARTRATE 25 MG: 25 TABLET, FILM COATED ORAL at 08:08

## 2025-08-28 RX ADMIN — FAMOTIDINE 20 MG: 20 TABLET, FILM COATED ORAL at 08:08

## 2025-08-28 RX ADMIN — HYDROMORPHONE HYDROCHLORIDE 1 MG: 1 INJECTION, SOLUTION INTRAMUSCULAR; INTRAVENOUS; SUBCUTANEOUS at 08:08

## 2025-08-28 RX ADMIN — DIPHENHYDRAMINE HYDROCHLORIDE 25 MG: 50 INJECTION INTRAMUSCULAR; INTRAVENOUS at 11:08

## 2025-08-28 RX ADMIN — FAMOTIDINE 20 MG: 20 TABLET, FILM COATED ORAL at 09:08

## 2025-08-28 RX ADMIN — ACETAMINOPHEN 650 MG: 325 TABLET ORAL at 09:08

## 2025-08-28 RX ADMIN — OXYCODONE HYDROCHLORIDE 10 MG: 10 TABLET ORAL at 07:08

## 2025-08-28 RX ADMIN — HYDROMORPHONE HYDROCHLORIDE 1 MG: 1 INJECTION, SOLUTION INTRAMUSCULAR; INTRAVENOUS; SUBCUTANEOUS at 10:08

## 2025-08-28 RX ADMIN — DIPHENHYDRAMINE HYDROCHLORIDE 25 MG: 50 INJECTION, SOLUTION INTRAMUSCULAR; INTRAVENOUS at 10:08

## 2025-08-28 RX ADMIN — HYDROMORPHONE HYDROCHLORIDE 1 MG: 1 INJECTION, SOLUTION INTRAMUSCULAR; INTRAVENOUS; SUBCUTANEOUS at 05:08

## 2025-08-28 RX ADMIN — HYDROXYUREA 1000 MG: 500 CAPSULE ORAL at 09:08

## 2025-08-28 RX ADMIN — FLUOXETINE HYDROCHLORIDE 80 MG: 20 CAPSULE ORAL at 09:08

## 2025-08-28 RX ADMIN — SUCRALFATE ORAL 1 G: 1 SUSPENSION ORAL at 03:08

## 2025-08-28 RX ADMIN — DOCUSATE SODIUM AND SENNOSIDES 1 TABLET: 8.6; 5 TABLET, FILM COATED ORAL at 09:08

## 2025-08-28 RX ADMIN — DIPHENHYDRAMINE HYDROCHLORIDE 25 MG: 50 INJECTION INTRAMUSCULAR; INTRAVENOUS at 03:08

## 2025-08-28 RX ADMIN — GABAPENTIN 600 MG: 300 CAPSULE ORAL at 08:08

## 2025-08-28 RX ADMIN — OXYCODONE HYDROCHLORIDE 10 MG: 10 TABLET ORAL at 03:08

## 2025-08-28 RX ADMIN — DIPHENHYDRAMINE HYDROCHLORIDE 25 MG: 50 INJECTION, SOLUTION INTRAMUSCULAR; INTRAVENOUS at 03:08

## 2025-08-28 RX ADMIN — DIPHENHYDRAMINE HYDROCHLORIDE 25 MG: 50 INJECTION INTRAMUSCULAR; INTRAVENOUS at 08:08

## 2025-08-28 RX ADMIN — DIPHENHYDRAMINE HYDROCHLORIDE 25 MG: 50 INJECTION INTRAMUSCULAR; INTRAVENOUS at 01:08

## 2025-08-28 RX ADMIN — LACTULOSE 15 G: 20 SOLUTION ORAL at 03:08

## 2025-08-28 RX ADMIN — DOCUSATE SODIUM AND SENNOSIDES 1 TABLET: 8.6; 5 TABLET, FILM COATED ORAL at 08:08

## 2025-08-28 RX ADMIN — ACETAMINOPHEN 650 MG: 325 TABLET ORAL at 01:08

## 2025-08-28 RX ADMIN — LACTULOSE 15 G: 20 SOLUTION ORAL at 08:08

## 2025-08-28 RX ADMIN — FOLIC ACID 1 MG: 1 TABLET ORAL at 09:08

## 2025-08-28 RX ADMIN — GABAPENTIN 600 MG: 300 CAPSULE ORAL at 09:08

## 2025-08-28 RX ADMIN — GABAPENTIN 600 MG: 300 CAPSULE ORAL at 03:08

## 2025-08-29 VITALS
TEMPERATURE: 99 F | WEIGHT: 238.13 LBS | OXYGEN SATURATION: 98 % | RESPIRATION RATE: 18 BRPM | BODY MASS INDEX: 43.82 KG/M2 | HEIGHT: 62 IN | HEART RATE: 74 BPM | DIASTOLIC BLOOD PRESSURE: 81 MMHG | SYSTOLIC BLOOD PRESSURE: 140 MMHG

## 2025-08-29 LAB
ABSOLUTE EOSINOPHIL (OHS): 0.29 K/UL
ABSOLUTE MONOCYTE (OHS): 0.77 K/UL (ref 0.3–1)
ABSOLUTE NEUTROPHIL COUNT (OHS): 3.17 K/UL (ref 1.8–7.7)
ALBUMIN SERPL BCP-MCNC: 3.6 G/DL (ref 3.5–5.2)
ALP SERPL-CCNC: 85 UNIT/L (ref 40–150)
ALT SERPL W/O P-5'-P-CCNC: 12 UNIT/L (ref 10–44)
ANION GAP (OHS): 7 MMOL/L (ref 8–16)
AST SERPL-CCNC: 23 UNIT/L (ref 11–45)
BASOPHILS # BLD AUTO: 0.03 K/UL
BASOPHILS NFR BLD AUTO: 0.3 %
BILIRUB SERPL-MCNC: 0.5 MG/DL (ref 0.1–1)
BUN SERPL-MCNC: 12 MG/DL (ref 6–20)
CALCIUM SERPL-MCNC: 9.2 MG/DL (ref 8.7–10.5)
CHLORIDE SERPL-SCNC: 107 MMOL/L (ref 95–110)
CO2 SERPL-SCNC: 27 MMOL/L (ref 23–29)
CREAT SERPL-MCNC: 1.2 MG/DL (ref 0.5–1.4)
ERYTHROCYTE [DISTWIDTH] IN BLOOD BY AUTOMATED COUNT: 23.6 % (ref 11.5–14.5)
GFR SERPLBLD CREATININE-BSD FMLA CKD-EPI: 56 ML/MIN/1.73/M2
GLUCOSE SERPL-MCNC: 79 MG/DL (ref 70–110)
HCT VFR BLD AUTO: 24.7 % (ref 37–48.5)
HGB BLD-MCNC: 8.3 GM/DL (ref 12–16)
IMM GRANULOCYTES # BLD AUTO: 0.02 K/UL (ref 0–0.04)
IMM GRANULOCYTES NFR BLD AUTO: 0.2 % (ref 0–0.5)
LYMPHOCYTES # BLD AUTO: 4.75 K/UL (ref 1–4.8)
MAGNESIUM SERPL-MCNC: 2 MG/DL (ref 1.6–2.6)
MCH RBC QN AUTO: 21.7 PG (ref 27–31)
MCHC RBC AUTO-ENTMCNC: 33.6 G/DL (ref 32–36)
MCV RBC AUTO: 65 FL (ref 82–98)
NUCLEATED RBC (/100WBC) (OHS): 10 /100 WBC
PLATELET # BLD AUTO: 153 K/UL (ref 150–450)
PLATELET BLD QL SMEAR: NORMAL
PMV BLD AUTO: ABNORMAL FL
POTASSIUM SERPL-SCNC: 3.6 MMOL/L (ref 3.5–5.1)
PROT SERPL-MCNC: 7 GM/DL (ref 6–8.4)
RBC # BLD AUTO: 3.83 M/UL (ref 4–5.4)
RELATIVE EOSINOPHIL (OHS): 3.2 %
RELATIVE LYMPHOCYTE (OHS): 52.6 % (ref 18–48)
RELATIVE MONOCYTE (OHS): 8.5 % (ref 4–15)
RELATIVE NEUTROPHIL (OHS): 35.2 % (ref 38–73)
SODIUM SERPL-SCNC: 141 MMOL/L (ref 136–145)
WBC # BLD AUTO: 9.03 K/UL (ref 3.9–12.7)

## 2025-08-29 PROCEDURE — 84155 ASSAY OF PROTEIN SERUM: CPT | Mod: HCNC | Performed by: HOSPITALIST

## 2025-08-29 PROCEDURE — 83735 ASSAY OF MAGNESIUM: CPT | Mod: HCNC | Performed by: HOSPITALIST

## 2025-08-29 PROCEDURE — 99900035 HC TECH TIME PER 15 MIN (STAT): Mod: HCNC

## 2025-08-29 PROCEDURE — 93005 ELECTROCARDIOGRAM TRACING: CPT | Mod: HCNC

## 2025-08-29 PROCEDURE — 93010 ELECTROCARDIOGRAM REPORT: CPT | Mod: HCNC,,, | Performed by: INTERNAL MEDICINE

## 2025-08-29 PROCEDURE — 85025 COMPLETE CBC W/AUTO DIFF WBC: CPT | Mod: HCNC | Performed by: HOSPITALIST

## 2025-08-29 PROCEDURE — 25000003 PHARM REV CODE 250: Mod: HCNC | Performed by: HOSPITALIST

## 2025-08-29 PROCEDURE — 63600175 PHARM REV CODE 636 W HCPCS: Mod: HCNC | Performed by: HOSPITALIST

## 2025-08-29 RX ORDER — LIDOCAINE 50 MG/G
2 PATCH TOPICAL DAILY
Qty: 60 PATCH | Refills: 1 | Status: SHIPPED | OUTPATIENT
Start: 2025-08-29

## 2025-08-29 RX ORDER — HEPARIN SODIUM (PORCINE) LOCK FLUSH IV SOLN 100 UNIT/ML 100 UNIT/ML
100 SOLUTION INTRAVENOUS ONCE
Status: DISCONTINUED | OUTPATIENT
Start: 2025-08-29 | End: 2025-08-29 | Stop reason: SDUPTHER

## 2025-08-29 RX ORDER — HEPARIN 100 UNIT/ML
100 SYRINGE INTRAVENOUS ONCE
Status: COMPLETED | OUTPATIENT
Start: 2025-08-29 | End: 2025-08-29

## 2025-08-29 RX ORDER — OXYCODONE HYDROCHLORIDE 10 MG/1
10 TABLET ORAL EVERY 6 HOURS PRN
Qty: 42 TABLET | Refills: 0 | Status: SHIPPED | OUTPATIENT
Start: 2025-08-29 | End: 2025-08-29 | Stop reason: HOSPADM

## 2025-08-29 RX ORDER — HYDROMORPHONE HYDROCHLORIDE 2 MG/ML
2 INJECTION, SOLUTION INTRAMUSCULAR; INTRAVENOUS; SUBCUTANEOUS ONCE
Status: COMPLETED | OUTPATIENT
Start: 2025-08-29 | End: 2025-08-29

## 2025-08-29 RX ORDER — OXYCODONE AND ACETAMINOPHEN 10; 325 MG/1; MG/1
1 TABLET ORAL EVERY 6 HOURS PRN
Start: 2025-08-29

## 2025-08-29 RX ADMIN — DIPHENHYDRAMINE HYDROCHLORIDE 25 MG: 50 INJECTION INTRAMUSCULAR; INTRAVENOUS at 09:08

## 2025-08-29 RX ADMIN — ACETAMINOPHEN 650 MG: 325 TABLET ORAL at 05:08

## 2025-08-29 RX ADMIN — HYDROXYUREA 1000 MG: 500 CAPSULE ORAL at 09:08

## 2025-08-29 RX ADMIN — HYDROMORPHONE HYDROCHLORIDE 1 MG: 1 INJECTION, SOLUTION INTRAMUSCULAR; INTRAVENOUS; SUBCUTANEOUS at 02:08

## 2025-08-29 RX ADMIN — FLUOXETINE HYDROCHLORIDE 80 MG: 20 CAPSULE ORAL at 09:08

## 2025-08-29 RX ADMIN — OXYCODONE HYDROCHLORIDE 10 MG: 10 TABLET ORAL at 07:08

## 2025-08-29 RX ADMIN — PANTOPRAZOLE SODIUM 40 MG: 40 TABLET, DELAYED RELEASE ORAL at 09:08

## 2025-08-29 RX ADMIN — FAMOTIDINE 20 MG: 20 TABLET, FILM COATED ORAL at 09:08

## 2025-08-29 RX ADMIN — LACTULOSE 15 G: 20 SOLUTION ORAL at 09:08

## 2025-08-29 RX ADMIN — OXYCODONE HYDROCHLORIDE 10 MG: 10 TABLET ORAL at 02:08

## 2025-08-29 RX ADMIN — SUCRALFATE ORAL 1 G: 1 SUSPENSION ORAL at 06:08

## 2025-08-29 RX ADMIN — AMLODIPINE BESYLATE 10 MG: 5 TABLET ORAL at 09:08

## 2025-08-29 RX ADMIN — APIXABAN 5 MG: 2.5 TABLET, FILM COATED ORAL at 11:08

## 2025-08-29 RX ADMIN — DOCUSATE SODIUM AND SENNOSIDES 1 TABLET: 8.6; 5 TABLET, FILM COATED ORAL at 09:08

## 2025-08-29 RX ADMIN — GABAPENTIN 600 MG: 300 CAPSULE ORAL at 09:08

## 2025-08-29 RX ADMIN — FOLIC ACID 1 MG: 1 TABLET ORAL at 09:08

## 2025-08-29 RX ADMIN — HEPARIN 100 UNITS: 100 SYRINGE at 01:08

## 2025-08-29 RX ADMIN — HYDROMORPHONE HYDROCHLORIDE 2 MG: 2 INJECTION INTRAMUSCULAR; INTRAVENOUS; SUBCUTANEOUS at 09:08

## 2025-08-29 RX ADMIN — HYDROMORPHONE HYDROCHLORIDE 1 MG: 1 INJECTION, SOLUTION INTRAMUSCULAR; INTRAVENOUS; SUBCUTANEOUS at 12:08

## 2025-08-29 RX ADMIN — ACETAMINOPHEN 650 MG: 325 TABLET ORAL at 01:08

## 2025-08-29 RX ADMIN — METOPROLOL TARTRATE 25 MG: 25 TABLET, FILM COATED ORAL at 09:08

## 2025-08-29 RX ADMIN — DIPHENHYDRAMINE HYDROCHLORIDE 25 MG: 50 INJECTION INTRAMUSCULAR; INTRAVENOUS at 02:08

## 2025-08-29 RX ADMIN — HYDROMORPHONE HYDROCHLORIDE 1 MG: 1 INJECTION, SOLUTION INTRAMUSCULAR; INTRAVENOUS; SUBCUTANEOUS at 04:08

## 2025-08-29 RX ADMIN — SUCRALFATE ORAL 1 G: 1 SUSPENSION ORAL at 11:08

## 2025-08-29 RX ADMIN — DIPHENHYDRAMINE HYDROCHLORIDE 25 MG: 50 INJECTION INTRAMUSCULAR; INTRAVENOUS at 04:08

## 2025-08-29 RX ADMIN — DIPHENHYDRAMINE HYDROCHLORIDE 25 MG: 50 INJECTION INTRAMUSCULAR; INTRAVENOUS at 12:08

## 2025-08-30 LAB
OHS QRS DURATION: 100 MS
OHS QTC CALCULATION: 480 MS

## 2025-09-02 ENCOUNTER — OUTPATIENT CASE MANAGEMENT (OUTPATIENT)
Dept: ADMINISTRATIVE | Facility: OTHER | Age: 45
End: 2025-09-02
Payer: MEDICARE